# Patient Record
Sex: MALE | Race: WHITE | NOT HISPANIC OR LATINO | Employment: OTHER | URBAN - METROPOLITAN AREA
[De-identification: names, ages, dates, MRNs, and addresses within clinical notes are randomized per-mention and may not be internally consistent; named-entity substitution may affect disease eponyms.]

---

## 2017-01-30 ENCOUNTER — TRANSFERRED RECORDS (OUTPATIENT)
Dept: HEALTH INFORMATION MANAGEMENT | Facility: CLINIC | Age: 72
End: 2017-01-30

## 2017-02-23 ENCOUNTER — OFFICE VISIT (OUTPATIENT)
Dept: INTERNAL MEDICINE | Facility: CLINIC | Age: 72
End: 2017-02-23
Payer: COMMERCIAL

## 2017-02-23 VITALS
TEMPERATURE: 98.9 F | HEIGHT: 72 IN | HEART RATE: 120 BPM | SYSTOLIC BLOOD PRESSURE: 148 MMHG | OXYGEN SATURATION: 96 % | BODY MASS INDEX: 34.81 KG/M2 | DIASTOLIC BLOOD PRESSURE: 78 MMHG | WEIGHT: 257 LBS

## 2017-02-23 DIAGNOSIS — Z01.810 PRE-OPERATIVE CARDIOVASCULAR EXAMINATION: Primary | ICD-10-CM

## 2017-02-23 DIAGNOSIS — M25.512 ACUTE PAIN OF LEFT SHOULDER: ICD-10-CM

## 2017-02-23 PROCEDURE — 99214 OFFICE O/P EST MOD 30 MIN: CPT | Performed by: NURSE PRACTITIONER

## 2017-02-23 NOTE — MR AVS SNAPSHOT
After Visit Summary   2/23/2017    Lance Ibrahim    MRN: [id number]           Patient Information     Date Of Birth          1945        Visit Information        Provider Department      2/23/2017 1:40 PM Anh Spivey NP [facility]        Today's Diagnoses     Pre-operative cardiovascular examination    -  1    Acute pain of left shoulder           Follow-ups after your visit        Additional Services     ORTHO  REFERRAL       [facility] is referring you to the Orthopedic  Services at [facility].       The  Representative will assist you in the coordination of your Orthopedic and Musculoskeletal Care as prescribed by your physician.    The  Representative will call you within 1 business day to help schedule your appointment, or you may contact the  Representative at:    All areas ~ [phone]     Type of Referral : Non Surgical       Timeframe requested: 1 - 2 days    Coverage of these services is subject to the terms and limitations of your health insurance plan.  Please call member services at your health plan with any benefit or coverage questions.      If X-rays, CT or MRI's have been performed, please contact the facility where they were done to arrange for , prior to your scheduled appointment.  Please bring this referral request to your appointment and present it to your specialist.                  Who to contact     If you have questions or need follow up information about today's clinic visit or your schedule please contact [facility] directly at [phone].  Normal or non-critical lab and imaging results will be communicated to you by MyChart, letter or phone within 4 business days after the clinic has received the results. If you do not hear from us within 7 days, please contact the clinic through MyChart or phone. If you have a critical or  "abnormal lab result, we will notify you by phone as soon as possible.  Submit refill requests through [employer] or call your pharmacy and they will forward the refill request to us. Please allow 3 business days for your refill to be completed.          Additional Information About Your Visit        MyChart Information     [employer] lets you send messages to your doctor, view your test results, renew your prescriptions, schedule appointments and more. To sign up, go to [email or web address] . Click on \"Log in\" on the left side of the screen, which will take you to the Welcome page. Then click on \"Sign up Now\" on the right side of the page.     You will be asked to enter the access code listed below, as well as some personal information. Please follow the directions to create your username and password.     Your access code is: [email or web address]  Expires: 2017  2:19 PM     Your access code will  in 90 days. If you need help or a new code, please call your [facility] clinic or [phone].        Care EveryWhere ID     This is your Care EveryWhere ID. This could be used by other organizations to access your [facility] medical records  [id number]        Your Vitals Were     Pulse Temperature Height Pulse Oximetry BMI (Body Mass Index)       120 98.9  F (37.2  C) (Oral) 6' (1.829 m) 96% 34.86 kg/m2        Blood Pressure from Last 3 Encounters:   17 148/78   10/27/16 128/74   10/22/16 139/71    Weight from Last 3 Encounters:   17 257 lb (116.6 kg)   10/27/16 256 lb 3.2 oz (116.2 kg)   10/22/16 250 lb (113.4 kg)              We Performed the Following     ORTHO  REFERRAL        Primary Care Provider Office Phone # Fax #    Anh Spivey [phone]       [facility] [address] [facility] [facility] [address]        Thank you!     Thank you for choosing [facility]  for your care. Our goal is always to provide you with excellent care. Hearing back from " our patients is one way we can continue to improve our services. Please take a few minutes to complete the written survey that you may receive in the mail after your visit with us. Thank you!             Your Updated Medication List - Protect others around you: Learn how to safely use, store and throw away your medicines at www.disposemymeds.org.          This list is accurate as of: 2/23/17  2:19 PM.  Always use your most recent med list.                   Brand Name Dispense Instructions for use    ALEVE 220 MG capsule   Generic drug:  naproxen sodium      Take 220 mg by mouth as needed.       aspirin 325 MG tablet      Take by mouth daily       blood glucose monitoring lancets     204 Box    1 each 2 times daily Pls give pt #204 lancets (2 boxes)       blood glucose monitoring test strip    ACCU-CHEK SMARTVIEW    200 each    Test twice daily       CENTRUM SILVER per tablet      Take 1 tablet by mouth daily.       ferrous gluconate 324 (38 FE) MG tablet    FERGON     Take 1 tablet by mouth daily.       finasteride 5 MG tablet    PROSCAR     Take 5 mg by mouth daily.       fluocinonide 0.05 % cream    LIDEX    60 g    Apply sparingly to affected area twice daily as needed.  Do not apply to face.       fluticasone 50 MCG/ACT spray    FLONASE    16 g    Spray 1-2 sprays into both nostrils daily       lisinopril 10 MG tablet    PRINIVIL/ZESTRIL    180 tablet    Take 2 tablets (20 mg) by mouth daily       loratadine 10 MG tablet    CLARITIN     Take 10 mg by mouth daily       metFORMIN 1000 MG tablet    GLUCOPHAGE    180 tablet    Take 1 tablet (1,000 mg) by mouth 2 times daily (with meals)       SYSTANE BALANCE 0.6 % Soln ophthalmic solution   Generic drug:  propylene glycol      Apply  to eye as needed.       tamsulosin 0.4 MG capsule    FLOMAX     Take 1 capsule by mouth daily.

## 2017-02-23 NOTE — NURSING NOTE
Chief Complaint   Patient presents with     Pre-Op Exam       Initial /78 (BP Location: Right arm, Patient Position: Chair, Cuff Size: Adult Large)  Pulse 120  Temp 98.9  F (37.2  C) (Oral)  Ht 6' (1.829 m)  Wt 257 lb (116.6 kg)  SpO2 96%  BMI 34.86 kg/m2 Estimated body mass index is 34.86 kg/(m^2) as calculated from the following:    Height as of this encounter: 6' (1.829 m).    Weight as of this encounter: 257 lb (116.6 kg).  Medication Reconciliation: complete   Kenya Martinez CMA

## 2017-02-23 NOTE — PROGRESS NOTES
[place] [address]  [facility] [address]  [phone]  Dept: [phone]    PRE-OP EVALUATION:  Today's date: 2017    Lance Ibrahim (: 1945) presents for pre-operative evaluation assessment as requested by Dr. Rebollar.  He requires evaluation and anesthesia risk assessment prior to undergoing surgery/procedure for treatment of left eye cataract.  Proposed procedure: Left cataract extraction    Date of Surgery/ Procedure: 3/7/17  Time of Surgery/ Procedure:   [facility]  Fax number for surgical facility: [phone]  Primary Physician: Anh Spivey  Type of Anesthesia Anticipated: Local with MAC    Patient has a Health Care Directive or Living Will:  NO    1. YES - DO YOU HAVE A HISTORY OF HEART ATTACK, STROKE, STENT, BYPASS OR SURGERY ON AN ARTERY IN THE HEAD, NECK, HEART OR LEG? Stroke   2. NO - Do you ever have any pain or discomfort in your chest?  3. NO - Do you have a history of  Heart Failure?  4. NO - Are you troubled by shortness of breath when: walking on the level, up a slight hill or at night?  5. NO - Do you currently have a cold, bronchitis or other respiratory infection?  6. NO - Do you have a cough, shortness of breath or wheezing?  7. YES - DO YOU SOMETIMES GET PAINS IN THE CALVES OF YOUR LEGS WHEN YOU WALK?   8. NO - Do you or anyone in your family have previous history of blood clots?  9. NO - Do you or does anyone in your family have a serious bleeding problem such as prolonged bleeding following surgeries or cuts?  10. YES - HAVE YOU EVER HAD PROBLEMS WITH ANEMIA OR BEEN TOLD TO TAKE IRON PILLS? When donating blood  11. NO - Have you had any abnormal blood loss such as black, tarry or bloody stools, or abnormal vaginal bleeding?  12. NO - Have you ever had a blood transfusion?  13. NO - Have you or any of your relatives ever had problems with anesthesia?  14. YES - DO YOU HAVE  SLEEP APNEA, EXCESSIVE SNORING OR DAYTIME DROWSINESS? Mouth breathing  15. NO - Do you have any prosthetic heart valves?  16. NO - Do you have prosthetic joints?  17. NO - Is there any chance that you may be pregnant?      HPI:                                                      Brief HPI related to upcoming procedure: OS cataract       DIABETES - Patient has a longstanding history of DiabetesType Type II . Patient is being treated with oral agents and denies significant side effects. Control has been good. Complicating factors include but are not limited to: HTN .                                                                                                             .  HYPERTENSION - Patient has longstanding history of mod-severe HTN , currently denies any symptoms referable to elevated blood pressure. Specifically denies chest pain, palpitations, dyspnea, orthopnea, PND or peripheral edema. Blood pressure readings have been in normal range. Current medication regimen is as listed below. Patient denies any side effects of medication.                                                                                                                                                                                          .    MEDICAL HISTORY:                                                      Patient Active Problem List    Diagnosis Date Noted     Type 2 diabetes, HbA1c goal < 7% (H) 02/22/2013     Priority: High     HTN (hypertension) 07/06/2008     Priority: High     Transient cerebral ischemia 07/09/2004     Priority: High     Problem list name updated by automated process. Provider to review       Benign essential hypertension 02/17/2016     Priority: Medium     Obesity 10/14/2015     Priority: Medium     Diabetes mellitus type 2, uncomplicated (H) 10/14/2015     Priority: Medium     CARDIOVASCULAR SCREENING; LDL GOAL LESS THAN 100 10/31/2010     Priority: Medium     Hypertrophy of prostate with urinary  obstruction 07/09/2004     Priority: Medium     Problem list name updated by automated process. Provider to review       Ventral hernia 06/28/2004     Priority: Medium     Problem list name updated by automated process. Provider to review       Gout 07/20/2012     Priority: Low     Advanced directives, counseling/discussion 12/22/2011     Priority: Low     Advance Directive Problem List Overview:   Name Relationship Phone    Primary Health Care Agent            Alternative Health Care Agent          Discussed advance care planning with patient; however, patient declined at this time. 12/22/2011          Chronic rhinitis 07/09/2004     Priority: Low      Past Medical History   Diagnosis Date     Chronic rhinitis      Diabetes mellitus (H)      HTN (hypertension)      Hypertrophy of prostate with urinary obstruction and other lower urinary tract symptoms (LUTS)      TIA (transient ischaemic attack) 1993     Unspecified transient cerebral ischemia 1993     No definite source found     Past Surgical History   Procedure Laterality Date     C nonspecific procedure  1985     Diastasis recti repair     C nonspecific procedure  1987     Ventral hernia repair     C nonspecific procedure       ORIF of left shoulder     Colonoscopy  3/9/2013     Procedure: COLONOSCOPY;  COLONOSCOPY;  Surgeon: Chau Hogan MD;  Location:  GI     Hernia repair  7/13/2004     ventral      Foot surgery  4/2013     cyst removal      Current Outpatient Prescriptions   Medication Sig Dispense Refill     fluticasone (FLONASE) 50 MCG/ACT spray Spray 1-2 sprays into both nostrils daily 16 g 10     blood glucose monitoring (ACCU-CHEK FASTCLIX) lancets 1 each 2 times daily Pls give pt #204 lancets (2 boxes) 204 Box 1     blood glucose monitoring (ACCU-CHEK SMARTVIEW) test strip Test twice daily 200 each 1     loratadine (CLARITIN) 10 MG tablet Take 10 mg by mouth daily       metFORMIN (GLUCOPHAGE) 1000 MG tablet Take 1 tablet (1,000 mg) by mouth 2  "times daily (with meals) 180 tablet 3     fluocinonide (LIDEX) 0.05 % cream Apply sparingly to affected area twice daily as needed.  Do not apply to face. 60 g 0     lisinopril (PRINIVIL,ZESTRIL) 10 MG tablet Take 2 tablets (20 mg) by mouth daily 180 tablet 4     aspirin 325 MG tablet Take by mouth daily       ferrous gluconate (FERGON) 324 (38 FE) MG tablet Take 1 tablet by mouth daily.       propylene glycol (SYSTANE BALANCE) 0.6 % SOLN Apply  to eye as needed.       tamsulosin (FLOMAX) 0.4 MG 24 hr capsule Take 1 capsule by mouth daily.       finasteride (PROSCAR) 5 MG tablet Take 5 mg by mouth daily.       Multiple Vitamins-Minerals (CENTRUM SILVER) per tablet Take 1 tablet by mouth daily.       naproxen sodium (ALEVE) 220 MG capsule Take 220 mg by mouth as needed.       OTC products: None, except as noted above    Allergies   Allergen Reactions     Penicillins      \"anaphylactic\"     Sulfa Drugs      \"itchy rash, swelling of face and hands\"      Latex Allergy: NO    Social History   Substance Use Topics     Smoking status: Former Smoker     Quit date: 3/17/1993     Smokeless tobacco: Never Used     Alcohol use Yes      Comment: Occ, Once a month     History   Drug Use No       REVIEW OF SYSTEMS:                                                    C: NEGATIVE for fever, chills, change in weight  E/M: NEGATIVE for ear, mouth and throat problems  R: NEGATIVE for significant cough or SOB  CV: NEGATIVE for chest pain, palpitations or peripheral edema  GI: NEGATIVE for nausea, abdominal pain, heartburn, or change in bowel habits  : NEGATIVE for frequency, dysuria, or hematuria  M: NEGATIVE for significant arthralgias or myalgia  N: NEGATIVE for weakness, dizziness or paresthesias  E: NEGATIVE for temperature intolerance, skin/hair changes  H: NEGATIVE for bleeding problems    EXAM:                                                    /78 (BP Location: Right arm, Patient Position: Chair, Cuff Size: Adult Large)  " Pulse 120  Temp 98.9  F (37.2  C) (Oral)  Ht 6' (1.829 m)  Wt 257 lb (116.6 kg)  SpO2 96%  BMI 34.86 kg/m2    GENERAL APPEARANCE: Obese     NECK: no adenopathy, no asymmetry, masses, or scars and thyroid normal to palpation     RESP: lungs clear to auscultation - no rales, rhonchi or wheezes     CV: regular rates and rhythm, normal S1 S2, no S3 or S4 and no murmur, click or rub -     ABDOMEN:  soft, nontender, no HSM or masses and bowel sounds normal     NEURO: Normal strength and tone, sensory exam grossly normal, mentation intact and speech normal    DIAGNOSTICS:                                                    No labs or EKG required for low risk surgery (cataract, skin procedure, breast biopsy, etc)    Recent Labs   Lab Test  10/22/16   2052  10/12/16   1617  03/05/16   0945   12/24/12   0804   HGB  12.9*  13.0*   --    < >  13.2*   PLT  258  237   --    < >  227   INR   --    --    --    --   1.05   NA  142  140  139   < >   --    POTASSIUM  4.1  4.3  4.9   < >   --    CR  1.06  0.94  0.86   < >   --    A1C   --   5.6  6.1*   < >   --     < > = values in this interval not displayed.        IMPRESSION:                                                    Reason for surgery/procedure: OD cataract    The proposed surgical procedure is considered LOW risk.    REVISED CARDIAC RISK INDEX  The patient has the following serious cardiovascular risks for perioperative complications such as (MI, PE, VFib and 3  AV Block):  No serious cardiac risks  INTERPRETATION: 0 risks: Class I (very low risk - 0.4% complication rate)    The patient has the following additional risks for perioperative complications:  No identified additional risks    No diagnosis found.    RECOMMENDATIONS:                                                      --Consult hospital rounder / IM to assist post-op medical management        APPROVAL GIVEN to proceed with proposed procedure, without further diagnostic evaluation       Signed Electronically by:  Anh Spivey NP    Copy of this evaluation report is provided to requesting physician.    [facility] Preop Guidelines

## 2017-02-24 ENCOUNTER — RADIANT APPOINTMENT (OUTPATIENT)
Dept: GENERAL RADIOLOGY | Facility: CLINIC | Age: 72
End: 2017-02-24
Attending: FAMILY MEDICINE
Payer: COMMERCIAL

## 2017-02-24 ENCOUNTER — OFFICE VISIT (OUTPATIENT)
Dept: ORTHOPEDICS | Facility: CLINIC | Age: 72
End: 2017-02-24
Payer: COMMERCIAL

## 2017-02-24 ENCOUNTER — THERAPY VISIT (OUTPATIENT)
Dept: PHYSICAL THERAPY | Facility: CLINIC | Age: 72
End: 2017-02-24
Payer: COMMERCIAL

## 2017-02-24 VITALS
WEIGHT: 257 LBS | HEIGHT: 72 IN | BODY MASS INDEX: 34.81 KG/M2 | DIASTOLIC BLOOD PRESSURE: 76 MMHG | SYSTOLIC BLOOD PRESSURE: 130 MMHG

## 2017-02-24 DIAGNOSIS — M54.2 CERVICALGIA: ICD-10-CM

## 2017-02-24 DIAGNOSIS — M50.30 DEGENERATIVE DISC DISEASE, CERVICAL: ICD-10-CM

## 2017-02-24 DIAGNOSIS — M54.2 CERVICALGIA: Primary | ICD-10-CM

## 2017-02-24 PROCEDURE — 72040 X-RAY EXAM NECK SPINE 2-3 VW: CPT

## 2017-02-24 PROCEDURE — 97110 THERAPEUTIC EXERCISES: CPT | Mod: GP | Performed by: PHYSICAL THERAPIST

## 2017-02-24 PROCEDURE — 97162 PT EVAL MOD COMPLEX 30 MIN: CPT | Mod: GP | Performed by: PHYSICAL THERAPIST

## 2017-02-24 PROCEDURE — G8982 BODY POS GOAL STATUS: HCPCS | Mod: GP | Performed by: PHYSICAL THERAPIST

## 2017-02-24 PROCEDURE — 99204 OFFICE O/P NEW MOD 45 MIN: CPT | Performed by: FAMILY MEDICINE

## 2017-02-24 PROCEDURE — G8981 BODY POS CURRENT STATUS: HCPCS | Mod: GP | Performed by: PHYSICAL THERAPIST

## 2017-02-24 PROCEDURE — 97530 THERAPEUTIC ACTIVITIES: CPT | Mod: GP | Performed by: PHYSICAL THERAPIST

## 2017-02-24 RX ORDER — PREDNISOLONE ACETATE 10 MG/ML
SUSPENSION/ DROPS OPHTHALMIC
COMMUNITY
Start: 2017-02-16 | End: 2017-05-15

## 2017-02-24 RX ORDER — CYCLOBENZAPRINE HCL 5 MG
5 TABLET ORAL
Qty: 10 TABLET | Refills: 0 | Status: SHIPPED | OUTPATIENT
Start: 2017-02-24 | End: 2017-05-15

## 2017-02-24 RX ORDER — GATIFLOXACIN 5 MG/ML
SOLUTION/ DROPS OPHTHALMIC
COMMUNITY
Start: 2017-02-16 | End: 2017-05-15

## 2017-02-24 RX ORDER — KETOROLAC TROMETHAMINE 5 MG/ML
SOLUTION OPHTHALMIC
COMMUNITY
Start: 2017-02-16 | End: 2017-05-15

## 2017-02-24 RX ORDER — METHYLPREDNISOLONE 4 MG
TABLET, DOSE PACK ORAL
Qty: 21 TABLET | Refills: 0 | Status: SHIPPED | OUTPATIENT
Start: 2017-02-24 | End: 2017-05-15

## 2017-02-24 NOTE — Clinical Note
[place]ance Brown Dr. [place] [facility] [place]  Please refer to the visit note at your convenience and feel free to contact me should you have any questions.  Thank you for the consult. Sincerely,  Mika Delacruz DO, [facility]

## 2017-02-24 NOTE — PATIENT INSTRUCTIONS
Thank you for allowing us to participate in your care today.  Please find below your visit diagnosis and the plan going forward.    1. Cervicalgia    2. Degenerative disc disease, cervical      Activity modification as discussed  See Bailey Almonte today for walk-in spine  Physical therapy: [facility] - [phone]  Referral for acupuncture.  They should call you to schedule this appointment  Rx for Flexeril to be used at night.  Discussed side effects and precautions given your age    Follow up in 2 week if not improving or sooner if needed. Call direct clinic number [[phone]] at any time with questions or concerns.    Mika Delacruz DO CAQSM  [facility]  Website: [email or web address]  Twitter: [email or web address]

## 2017-02-24 NOTE — MR AVS SNAPSHOT
After Visit Summary   2/24/2017    Lance Ibrahim    MRN: [id number]           Patient Information     Date Of Birth          1945        Visit Information        Provider Department      2/24/2017 9:40 AM Mika Delacruz DO [facility]        Today's Diagnoses     Cervicalgia    -  1    Degenerative disc disease, cervical          Care Instructions    Thank you for allowing us to participate in your care today.  Please find below your visit diagnosis and the plan going forward.    1. Cervicalgia    2. Degenerative disc disease, cervical      Activity modification as discussed  See Bailey Almonte today for walk-in spine  Physical therapy: [facility] - [phone]  Referral for acupuncture.  They should call you to schedule this appointment  Rx for Flexeril to be used at night.  Discussed side effects and precautions given your age    Follow up in 2 week if not improving or sooner if needed. Call direct clinic number [[phone]] at any time with questions or concerns.    Mika Delacruz DO [facility]  Website: [email or web address]  Twitter: [email or web address]          Follow-ups after your visit        Additional Services     [facility] PT, HAND, AND CHIROPRACTIC REFERRAL       **This order will print in the [facility] Scheduling Office**    Physical Therapy, Hand Therapy and Chiropractic Care are available through:    *[facility]  *[facility]  *[facility]    Call one number to schedule at any of the above locations: [phone].    Your provider has referred you to: Physical Therapy at [facility] or [facility]    Indication/Reason for Referral: Neck Pain - Bailey Almonte  Onset of Illness: 4 days  Therapy Orders: Evaluate and Treat  Special Programs: None  Special Request: None    Autumn Leyva      Additional Comments for the Therapist or Chiropractor: Formal physical therapy - exercises  to improve muscular coordination, endurance, strength, and mobility of the cervical spine and shoulder girdle along with exercises to improve repositioning acuity, oculomotor control and postural stability with use of modalities as needed with home exercise prescription.      Please be aware that coverage of these services is subject to the terms and limitations of your health insurance plan.  Call member services at your health plan with any benefit or coverage questions.      Please bring the following to your appointment:    *Your personal calendar for scheduling future appointments  *Comfortable clothing            [facility] PT, HAND, AND CHIROPRACTIC REFERRAL       **This order will print in the [facility] Scheduling Office**    Physical Therapy, Hand Therapy and Chiropractic Care are available through:    *[facility]  *[facility]  *[facility]    Call one number to schedule at any of the above locations: [phone].    Your provider has referred you to: Chiropractic at [facility] or [facility]    Indication/Reason for Referral: Left sided trap/scapular pain.  Advanced degenerative changes on xray.    Onset of Illness: chronic  Therapy Orders: Evaluate and Treat  Special Programs: None  Special Request: Dr. Carlos Boss - acupuncture. Working with his own Chiro for manipulations    Autumn Leyva      Additional Comments for the Therapist or Chiropractor:     Please be aware that coverage of these services is subject to the terms and limitations of your health insurance plan.  Call member services at your health plan with any benefit or coverage questions.      Please bring the following to your appointment:    *Your personal calendar for scheduling future appointments  *Comfortable clothing                  Who to contact     If you have questions or need follow up information about today's clinic visit or your schedule please contact [facility] SP[facility]TS MEDICINE  "directly at [phone].  Normal or non-critical lab and imaging results will be communicated to you by mylearnadfriendhart, letter or phone within 4 business days after the clinic has received the results. If you do not hear from us within 7 days, please contact the clinic through mylearnadfriendhart or phone. If you have a critical or abnormal lab result, we will notify you by phone as soon as possible.  Submit refill requests through [employer] or call your pharmacy and they will forward the refill request to us. Please allow 3 business days for your refill to be completed.          Additional Information About Your Visit        [employer]friend[employer] Information     [employer] lets you send messages to your doctor, view your test results, renew your prescriptions, schedule appointments and more. To sign up, go to [email or web address] . Click on \"Log in\" on the left side of the screen, which will take you to the Welcome page. Then click on \"Sign up Now\" on the right side of the page.     You will be asked to enter the access code listed below, as well as some personal information. Please follow the directions to create your username and password.     Your access code is: [email or web address]  Expires: 2017  2:19 PM     Your access code will  in 90 days. If you need help or a new code, please call your [facility] clinic or [phone].        Care EveryWhere ID     This is your Care EveryWhere ID. This could be used by other organizations to access your [facility] medical records  [id number]        Your Vitals Were     Height BMI (Body Mass Index)                6' (1.829 m) 34.86 kg/m2           Blood Pressure from Last 3 Encounters:   17 130/76   17 148/78   10/27/16 128/74    Weight from Last 3 Encounters:   17 257 lb (116.6 kg)   17 257 lb (116.6 kg)   10/27/16 256 lb 3.2 oz (116.2 kg)              We Performed the Following     [facility] PT, HAND, AND CHIROPRACTIC REFERRAL     [facility] PT, HAND, AND CHIROPRACTIC REFERRAL        "   Today's Medication Changes          These changes are accurate as of: 2/24/17 10:20 AM.  If you have any questions, ask your nurse or doctor.               Start taking these medicines.        Dose/Directions    cyclobenzaprine 5 MG tablet   Commonly known as:  FLEXERIL   Used for:  Cervicalgia   Started by:  Mika Delacruz DO        Dose:  5 mg   Take 1 tablet (5 mg) by mouth nightly as needed for muscle spasms   Quantity:  10 tablet   Refills:  0            Where to get your medicines      These medications were sent to [facility]/pharmacy #[id number] - [place], [place] [facility] [address]  [address] [address] [address]     Phone:  [phone]     cyclobenzaprine 5 MG tablet                Primary Care Provider Office Phone # Fax #    Anh Spivey -[phone]       [facility] [address] [facility] [address]        Thank you!     Thank you for choosing [facility] SP[facility]TS [facility]  for your care. Our goal is always to provide you with excellent care. Hearing back from our patients is one way we can continue to improve our services. Please take a few minutes to complete the written survey that you may receive in the mail after your visit with us. Thank you!             Your Updated Medication List - Protect others around you: Learn how to safely use, store and throw away your medicines at www.disposemymeds.org.          This list is accurate as of: 2/24/17 10:20 AM.  Always use your most recent med list.                   Brand Name Dispense Instructions for use    ALEVE 220 MG capsule   Generic drug:  naproxen sodium      Take 220 mg by mouth as needed.       aspirin 325 MG tablet      Take by mouth daily       blood glucose monitoring lancets     204 Box    1 each 2 times daily Pls give pt #204 lancets (2 boxes)       blood glucose monitoring test strip    ACCU-CHEK SMARTVIEW    200 each    Test twice daily       CENTRUM SILVER per tablet      Take 1 tablet  by mouth daily.       cyclobenzaprine 5 MG tablet    FLEXERIL    10 tablet    Take 1 tablet (5 mg) by mouth nightly as needed for muscle spasms       ferrous gluconate 324 (38 FE) MG tablet    FERGON     Take 1 tablet by mouth daily.       finasteride 5 MG tablet    PROSCAR     Take 5 mg by mouth daily.       fluocinonide 0.05 % cream    LIDEX    60 g    Apply sparingly to affected area twice daily as needed.  Do not apply to face.       fluticasone 50 MCG/ACT spray    FLONASE    16 g    Spray 1-2 sprays into both nostrils daily       gatifloxacin 0.5 % ophthalmic solution    ZYMAXID         ketorolac 0.5 % ophthalmic solution    ACULAR         lisinopril 10 MG tablet    PRINIVIL/ZESTRIL    180 tablet    Take 2 tablets (20 mg) by mouth daily       loratadine 10 MG tablet    CLARITIN     Take 10 mg by mouth daily       metFORMIN 1000 MG tablet    GLUCOPHAGE    180 tablet    Take 1 tablet (1,000 mg) by mouth 2 times daily (with meals)       prednisoLONE acetate 1 % ophthalmic susp    PRED FORTE         SYSTANE BALANCE 0.6 % Soln ophthalmic solution   Generic drug:  propylene glycol      Apply  to eye as needed.       tamsulosin 0.4 MG capsule    FLOMAX     Take 1 capsule by mouth daily.

## 2017-02-24 NOTE — MR AVS SNAPSHOT
"              After Visit Summary   2/24/2017    Lance Ibrahim    MRN: [id number]           Patient Information     Date Of Birth          1945        Visit Information        Provider Department      2/24/2017 11:40 AM Bailey Almonte, PT [facility]        Today's Diagnoses     Cervicalgia    -  1       Follow-ups after your visit        Your next 10 appointments already scheduled     Mar 01, 2017 10:40 AM CST   [facility] with Bailey Almonte PT   [facility] ([facility]  )    [address] [address] [address]   [phone]              Who to contact     If you have questions or need follow up information about today's clinic visit or your schedule please contact [facility] directly at [phone].  Normal or non-critical lab and imaging results will be communicated to you by MyChart, letter or phone within 4 business days after the clinic has received the results. If you do not hear from us within 7 days, please contact the clinic through MyChart or phone. If you have a critical or abnormal lab result, we will notify you by phone as soon as possible.  Submit refill requests through [employer] or call your pharmacy and they will forward the refill request to us. Please allow 3 business days for your refill to be completed.          Additional Information About Your Visit        MyChart Information     [employer] lets you send messages to your doctor, view your test results, renew your prescriptions, schedule appointments and more. To sign up, go to [email or web address] . Click on \"Log in\" on the left side of the screen, which will take you to the Welcome page. Then click on \"Sign up Now\" on the right side of the page.     You will be asked to enter the access code listed below, as well as some personal information. Please follow the directions to create your username and password.     Your access code is: [email or web address]  Expires: 5/24/2017  2:19 PM     Your access " code will  in 90 days. If you need help or a new code, please call your [facility] or [phone].        Care EveryWhere ID     This is your Care EveryWhere ID. This could be used by other organizations to access your [facility] medical records  [id number]         Blood Pressure from Last 3 Encounters:   17 130/76   17 148/78   10/27/16 128/74    Weight from Last 3 Encounters:   17 116.6 kg (257 lb)   17 116.6 kg (257 lb)   10/27/16 116.2 kg (256 lb 3.2 oz)              We Performed the Following     [facility] PT EVAL, MODERATE COMPLEXITY     [facility] INITIAL EVAL REPORT     THERAPEUTIC ACTIVITIES     THERAPEUTIC EXERCISES          Today's Medication Changes          These changes are accurate as of: 17  1:28 PM.  If you have any questions, ask your nurse or doctor.               Start taking these medicines.        Dose/Directions    cyclobenzaprine 5 MG tablet   Commonly known as:  FLEXERIL   Used for:  Cervicalgia   Started by:  Mika Delacruz DO        Dose:  5 mg   Take 1 tablet (5 mg) by mouth nightly as needed for muscle spasms   Quantity:  10 tablet   Refills:  0       methylPREDNISolone 4 MG tablet   Commonly known as:  MEDROL DOSEPAK   Used for:  Cervicalgia   Started by:  Mika Delacruz DO        Follow package instructions   Quantity:  21 tablet   Refills:  0            Where to get your medicines      These medications were sent to [facility]/pharmacy #[id number] - [place], [place] - [address]  [address] [address] [address]     Phone:  [phone]     cyclobenzaprine 5 MG tablet    methylPREDNISolone 4 MG tablet                Primary Care Provider Office Phone # Fax #    Anh Spivey -[phone]       [facility] [address] [facility] [address]        Thank you!     Thank you for choosing [facility]  for your care. Our goal is always to provide you with excellent care. Hearing back from our patients  is one way we can continue to improve our services. Please take a few minutes to complete the written survey that you may receive in the mail after your visit with us. Thank you!             Your Updated Medication List - Protect others around you: Learn how to safely use, store and throw away your medicines at www.disposemymeds.org.          This list is accurate as of: 2/24/17  1:28 PM.  Always use your most recent med list.                   Brand Name Dispense Instructions for use    ALEVE 220 MG capsule   Generic drug:  naproxen sodium      Take 220 mg by mouth as needed.       aspirin 325 MG tablet      Take by mouth daily       blood glucose monitoring lancets     204 Box    1 each 2 times daily Pls give pt #204 lancets (2 boxes)       blood glucose monitoring test strip    ACCU-CHEK SMARTVIEW    200 each    Test twice daily       CENTRUM SILVER per tablet      Take 1 tablet by mouth daily.       cyclobenzaprine 5 MG tablet    FLEXERIL    10 tablet    Take 1 tablet (5 mg) by mouth nightly as needed for muscle spasms       ferrous gluconate 324 (38 FE) MG tablet    FERGON     Take 1 tablet by mouth daily.       finasteride 5 MG tablet    PROSCAR     Take 5 mg by mouth daily.       fluocinonide 0.05 % cream    LIDEX    60 g    Apply sparingly to affected area twice daily as needed.  Do not apply to face.       fluticasone 50 MCG/ACT spray    FLONASE    16 g    Spray 1-2 sprays into both nostrils daily       gatifloxacin 0.5 % ophthalmic solution    ZYMAXID         ketorolac 0.5 % ophthalmic solution    ACULAR         lisinopril 10 MG tablet    PRINIVIL/ZESTRIL    180 tablet    Take 2 tablets (20 mg) by mouth daily       loratadine 10 MG tablet    CLARITIN     Take 10 mg by mouth daily       metFORMIN 1000 MG tablet    GLUCOPHAGE    180 tablet    Take 1 tablet (1,000 mg) by mouth 2 times daily (with meals)       methylPREDNISolone 4 MG tablet    MEDROL DOSEPAK    21 tablet    Follow package instructions        prednisoLONE acetate 1 % ophthalmic susp    PRED FORTE         SYSTANE BALANCE 0.6 % Soln ophthalmic solution   Generic drug:  propylene glycol      Apply  to eye as needed.       tamsulosin 0.4 MG capsule    FLOMAX     Take 1 capsule by mouth daily.

## 2017-02-24 NOTE — NURSING NOTE
Chief Complaint   Patient presents with     Musculoskeletal Problem       Initial /76  Ht 6' (1.829 m)  Wt 257 lb (116.6 kg)  BMI 34.86 kg/m2 Estimated body mass index is 34.86 kg/(m^2) as calculated from the following:    Height as of this encounter: 6' (1.829 m).    Weight as of this encounter: 257 lb (116.6 kg).  Medication Reconciliation: complete     Driss Batista, ATC

## 2017-02-24 NOTE — PROGRESS NOTES
"[facility] Initial Evaluation -- Cervical    Evaluation Date: February 24, 2017  Lance Ibrahim is a 71 year old male with a left shoulder condition.   Referral: Dr. Delacruz  Work mechanical stresses: desk/computer   Employment status: working full time  Leisure mechanical stresses: not able  Functional disability score (NDI):  38  VAS score (0-10): 9/10  Patient goals:  \"I want a magic bullet to be pain free\"    HISTORY:    Present symptoms:  Left shoulder.  Pain quality (sharp/shooting/stabbing/aching/burning/cramping):   sharp.  Paresthesia (yes/no):  no    Present since: February 20, 2017.    Symptoms (improving/unchanging/worsening):  unchanging.  Symptoms commenced as a result of: no apparent reason   Condition occurred in the following environment:  work    Symptoms at onset (neck/arm/forearm/headache): shoulder, left  Constant symptoms (neck/arm/forearm/headache): left shoulder  Intermittent symptoms (neck/arm/forearm/headache): n/a    Symptoms are made worse with the following: Always Turning, looking up/always  Symptoms are made better with the following: Always Lying, Always When still and ice    Disturbed sleep (yes/no): yes  Number of pillows: 3  Sleeping postures (prone/sup/side R/L): sides/mainly L    Previous episodes (0/1-5/6-10/11+): none Year of first episode: n/a    Previous history: no previous spinal history; did fracture/dislocate L UE with ORIF (1998) with limited L UE mobility  Previous treatments: chiro, somewhat helpful    Specific Questions: (as reported by the patient)  Dizziness/Tinnitus/Nausea/Swallowing (pos/neg): neg  Gait/Upper Limbs (normal/abnormal): normal  Medications (nil/NSAIDS/anlag/steroids/anticoag/other):  Muscle relaxants  Medical allergies:  Penicillin, sulfa  General health (excellent/good/fair/poor):  good  Pertinent medical history:  Rheumatoid Arthritis, Diabetes, Overweight, High blood pressure, Stroke and Sleep disorder/Apnea  Imaging " (NA/Xray/MRI):  xray  Recent or major surgery (yes/no): no  Night pain (yes/no): yes  Accidents (yes/no): no  Unexplained weight loss (yes/no): no  Barriers at home: none  Other red flags: none    EXAMINATION    Posture:   Sitting (good/fair/poor): poor  Standing (good/fair/poor): fair/poor     Protruded head (yes/no): yes    Wry Neck (right/left/nil):  no  Relevant (yes/no):  o     Correction of posture(better/worse/no effect): worse  Other observations:  none    Neurological:    Motor Deficit:  intact   Reflexes:  intact  Sensory Deficit:  intact   Dural signs:  Unable to assess due to L UE limited ROM     Movement Loss:   Bruce Mod Min Nil Pain   Protrusion   x  pdm   Flexion   x  erp   Retraction   x  erp   Extension  x x  erp   Lateral flexion R   x     Lateral flexion L  x   erp   Rotation R   x x    Rotation L  x   erp     Test Movements:   During: produces, abolishes, increases, decreases, no effect, centralizing, peripheralizing  After: better, worse, no better, no worse, no effect, centralized, peripheralized    Pretest symptoms sitting: L scap   Symptoms During Symptoms After ROM increased ROM decreased No Effect   PRO        Rep PRO        RET Increases No Worse      Rep RET Increases Worse   x   RET EXT        Rep RET EXT        Pretest symptoms lying:  L scap   Symptoms During Symptoms After ROM increased ROM decreased No Effect   RET Decreases No Better      Rep RET Decreases Better x     RET EXT        Rep RET EXT        If required, pretest symptoms sitting:  Not tested      ROM increased ROM decreased No Effect   LF-R        Rep LF-R        LF-L        Rep LF-L        ROT-R        Rep ROT-R        ROT-L        Rep ROT-L        FLEX        Rep FLEX            Static Tests:   Protrusion:    Flexion:    Retraction:    Extension (sitting/prone/supine):      Other Tests: n/a    Provisional Classification:  Derangement - Asymmetrical, unilateral, symptoms above elbow    Principle of Management:  Education:   Traffic light, posture, frequency    Equipment provided:  none  Mechanical therapy (Y/N):  Y   Extension principle:  Rep RET in supine x 10-12/2   Lateral principle:  n/a  Flexion principle:  n/a   Other:  n/a    ASSESSMENT/PLAN:    Patient is a 71 year old male with cervical complaints.    Patient has the following significant findings with corresponding treatment plan.                Diagnosis 1:  cervicalgia  Pain -  manual therapy, self management, education, directional preference exercise and home program  Decreased ROM/flexibility - manual therapy and therapeutic exercise  Decreased function - therapeutic activities  Impaired posture - neuro re-education    Therapy Evaluation Codes:   1) History comprised of:   Personal factors that impact the plan of care:      None.    Comorbidity factors that impact the plan of care are:      Diabetes, High blood pressure, Overweight, Rheumatoid arthritis and Stroke.     Medications impacting care: High blood pressure, Pain and Metformin.  2) Examination of Body Systems comprised of:   Body structures and functions that impact the plan of care:      Cervical spine.   Activity limitations that impact the plan of care are:      Driving, Reading/Computer work and Sitting.  3) Clinical presentation characteristics are:   Evolving/Changing.  4) Decision-Making    Moderate complexity using standardized patient assessment instrument and/or measureable assessment of functional outcome.  Cumulative Therapy Evaluation is: Moderate complexity.    Previous and current functional limitations:  (See Goal Flow Sheet for this information)    Short term and Long term goals: (See Goal Flow Sheet for this information)     Communication ability:  Patient appears to be able to clearly communicate and understand verbal and written communication and follow directions correctly.  Treatment Explanation - The following has been discussed with the patient:   RX ordered/plan of care  Anticipated  outcomes  Possible risks and side effects  This patient would benefit from PT intervention to resume normal activities.   Rehab potential is good.    Frequency:  1 X week, once daily  Duration:  for 6 weeks  Discharge Plan:  Achieve all LTG.  Independent in home treatment program.  Reach maximal therapeutic benefit.    Please refer to the daily flowsheet for treatment today, total treatment time and time spent performing 1:1 timed codes.

## 2017-02-24 NOTE — PROGRESS NOTES
ASSESSMENT & PLAN    ICD-10-CM    1. Cervicalgia M54.2 XR Cervical Spine 2/3 Views     [facility]     cyclobenzaprine (FLEXERIL) 5 MG tablet     [facility]   2. Degenerative disc disease, cervical M50.30    Reviewed xray - advanced degenerative/end plate changes and explained that pain is related to his neck  Looking for immediate fix / shot to resolve the pain and explained we don't give IM injections in our office  See Bailey Almonte today for walk-in spine  Physical therapy: [facility] - [phone]  Referral for acupuncture.  They should call you to schedule this appointment  Rx for Flexeril to be used at night.  Discussed side effects and precautions given your age    Follow up in 2 week if not improving or sooner if needed. Call direct clinic number [[phone]] at any time with questions or concerns. Instructed to call the office if the condition evolves or worsens.    -----    SUBJECTIVE  Lance Ibrahim is a/an 71 year old right hand dominant male who is seen in consultation at the request of Dr. Spivey for evaluation of left shoulder pain and upper trapezius pain. The patient is seen by themselves.    Onset: 4 day(s) ago. Reports insidious onset without acute precipitating event.  Worsened by: pressure, sitting  Better with: unknown  Quality: stabbing, deep, local - no radiating pain  Pain Scale (maximum/current)/10: 9/10 / 8/10  Treatments tried: ice, heat and Aleve, chiropractor  Orthopedic history: YES - Date: 1998, fell of a ladder and landed on shoulder  Relevant surgical history: YES - Date: 1998 fracture repair of left shoulder  Patient Social History: retired    He is having cataract surgery in about one week.    Patient's past medical, surgical, social, and family histories were reviewed today and no changes are noted.    REVIEW OF SYSTEMS:  10 point ROS is negative other than symptoms noted above in HPI, Past Medical  History or as stated below  Constitutional: NEGATIVE for fever, chills, change in weight  Skin: NEGATIVE for worrisome rashes, moles or lesions  GI/: NEGATIVE for bowel or bladder changes  Neuro: NEGATIVE for weakness, dizziness or paresthesias    OBJECTIVE:  /76  Ht 6' (1.829 m)  Wt 257 lb (116.6 kg)  BMI 34.86 kg/m2   General: healthy, alert and in no distress  HEENT: no scleral icterus or conjunctival erythema  Skin: no suspicious lesions or rash. No jaundice.  CV: regular rhythm by palpation  Resp: normal respiratory effort without conversational dyspnea   Psych: normal mood and affect  Gait: normal steady gait with appropriate coordination and balance  Neuro: normal light touch sensory exam of the bilateral upper extremities.  Motor strength as noted below.  MSK:  LEFT SHOULDER  Inspection:    Rounded shoulders, large mature scar anterior chest/humerus  Palpation:    NonTender about the AC joint, anterior capsule, proximal biceps tendon and supraspinatus insertion. Remainder of bony and tendinous landmarks are nontender.  Active Range of Motion:     Abduction 600, , , IR none - this is baseline for him 2/2 fracture/repair in 1998.      CERVICAL SPINE  Inspection:    normal cervical lordosis present, rounded shoulders, forward head posture  Palpation:    Tender about the upper trapezius (left) and medial border of scapula. Otherwise remainder of the landmarks and nontender.  Range of Motion:     Flexion limited by pain    Extension limited by pain    Right side bend full    Left side bend limited by pain    Right rotation full    Left rotation limited substantially by pain  Strength:    biceps (C6) 5/5, wrist extension (C6) 5/5, triceps (C7) 5/5, wrist flexion (C7) 5/5    Independent visualization of the below image:  XR CERVICAL SPINE   Advanced degenerative/end plate changes  Disc space narrowing at C6-7  Final radiology read pending    Patient's conditions were thoroughly discussed during  today's visit with greater than 50% of the visit spent counseling the patient with total time spent face-to-face with the patient being 15 minutes.    Mika Delacruz DO [facility]

## 2017-02-24 NOTE — LETTER
"DEPARTMENT OF HEALTH AND HUMAN SERVICES  CENTERS FOR MEDICARE & MEDICAID SERVICES    PLAN/UPDATED PLAN OF PROGRESS FOR OUTPATIENT REHABILITATION    PATIENTS NAME:  Lance Ibrahim   : 1945  PROVIDER NUMBER:    [id number]  [id number]   PROVIDER NAME: [facility]  MEDICAL RECORD NUMBER: [id number]   START OF CARE DATE:  17   TYPE:  PT  PRIMARY/TREATMENT DIAGNOSIS: Cervicalgia  VISITS FROM START OF CARE:  Rxs Used: 1     [facility] Initial Evaluation -- Cervical    Evaluation Date: 2017  Lance Ibrahim is a 71 year old male with a left shoulder condition.   Referral: Dr. Delacruz  Work mechanical stresses: desk/computer   Employment status: working full time  Leisure mechanical stresses: not able  Functional disability score (NDI):  38  VAS score (0-10): 9/10  Patient goals:  \"I want a magic bullet to be pain free\"    HISTORY:    Present symptoms:  Left shoulder.  Pain quality (sharp/shooting/stabbing/aching/burning/cramping):   sharp.  Paresthesia (yes/no):  no    Present since: 2017.    Symptoms (improving/unchanging/worsening):  unchanging.  Symptoms commenced as a result of: no apparent reason   Condition occurred in the following environment:  work    Symptoms at onset (neck/arm/forearm/headache): shoulder, left  Constant symptoms (neck/arm/forearm/headache): left shoulder  Intermittent symptoms (neck/arm/forearm/headache): n/a    Symptoms are made worse with the following: Always Turning, looking up/always  Symptoms are made better with the following: Always Lying, Always When still and ice    Disturbed sleep (yes/no): yes  Number of pillows: 3  Sleeping postures (prone/sup/side R/L): sides/mainly L    Previous episodes (0/1-5/6-10/11+): none Year of first episode: n/a    PATIENTS NAME:  Lance Ibrahim   : 1945      Previous history: no previous spinal history; did fracture/dislocate L UE with ORIF () with limited L UE " mobility  Previous treatments: chiro, somewhat helpful    Specific Questions: (as reported by the patient)  Dizziness/Tinnitus/Nausea/Swallowing (pos/neg): neg  Gait/Upper Limbs (normal/abnormal): normal  Medications (nil/NSAIDS/anlag/steroids/anticoag/other):  Muscle relaxants  Medical allergies:  Penicillin, sulfa  General health (excellent/good/fair/poor):  good  Pertinent medical history:  Rheumatoid Arthritis, Diabetes, Overweight, High blood pressure, Stroke and Sleep disorder/Apnea  Imaging (NA/Xray/MRI):  xray  Recent or major surgery (yes/no): no  Night pain (yes/no): yes  Accidents (yes/no): no  Unexplained weight loss (yes/no): no  Barriers at home: none  Other red flags: none    EXAMINATION    Posture:   Sitting (good/fair/poor): poor  Standing (good/fair/poor): fair/poor     Protruded head (yes/no): yes    Wry Neck (right/left/nil):  no  Relevant (yes/no):  o     Correction of posture(better/worse/no effect): worse  Other observations:  none    Neurological:  Motor Deficit:  intact   Reflexes:  intact  Sensory Deficit:  intact   Dural signs:  Unable to assess due to L UE limited ROM     Movement Loss:   Bruce Mod Min Nil Pain   Protrusion   x  pdm   Flexion   x  erp   Retraction   x  erp   Extension  x x  erp   Lateral flexion R   x     Lateral flexion L  x   erp   Rotation R   x x    Rotation L  x   erp   PATIENTS NAME:  Manuelsantos Lance   : 1945      Test Movements:   During: produces, abolishes, increases, decreases, no effect, centralizing, peripheralizing  After: better, worse, no better, no worse, no effect, centralized, peripheralized    Pretest symptoms sitting: L scap   Symptoms During Symptoms After ROM increased ROM decreased No Effect   PRO        Rep PRO        RET Increases No Worse      Rep RET Increases Worse   x   RET EXT        Rep RET EXT        Pretest symptoms lying:  L scap   Symptoms During Symptoms After ROM increased ROM decreased No Effect   RET Decreases No Better      Rep  RET Decreases Better x     RET EXT        Rep RET EXT        If required, pretest symptoms sitting:  Not tested      ROM increased ROM decreased No Effect   LF-R        Rep LF-R        LF-L        Rep LF-L        ROT-R        Rep ROT-R        ROT-L        Rep ROT-L        FLEX        Rep FLEX            Static Tests:   Protrusion:    Flexion:    Retraction:    Extension (sitting/prone/supine):      Other Tests: n/a    Provisional Classification:  Derangement - Asymmetrical, unilateral, symptoms above elbow    PATIENTS NAME:  Lance Ibrahim   : 1945      Principle of Management:  Education:  Traffic light, posture, frequency    Equipment provided:  none  Mechanical therapy (Y/N):  Y   Extension principle:  Rep RET in supine x 10-12/2   Lateral principle:  n/a  Flexion principle:  n/a   Other:  n/a    ASSESSMENT/PLAN:    Patient is a 71 year old male with cervical complaints.    Patient has the following significant findings with corresponding treatment plan.                Diagnosis 1:  cervicalgia  Pain -  manual therapy, self management, education, directional preference exercise and home program  Decreased ROM/flexibility - manual therapy and therapeutic exercise  Decreased function - therapeutic activities  Impaired posture - neuro re-education    Therapy Evaluation Codes:   1) History comprised of:   Personal factors that impact the plan of care:      None.    Comorbidity factors that impact the plan of care are:      Diabetes, High blood pressure, Overweight, Rheumatoid arthritis and Stroke.     Medications impacting care: High blood pressure, Pain and Metformin.  2) Examination of Body Systems comprised of:   Body structures and functions that impact the plan of care:      Cervical spine.   Activity limitations that impact the plan of care are:      Driving, Reading/Computer work and Sitting.  3) Clinical presentation characteristics are:   Evolving/Changing.  4) Decision-Making    Moderate complexity  "using standardized patient assessment instrument and/or measureable assessment of functional outcome.  Cumulative Therapy Evaluation is: Moderate complexity.    Previous and current functional limitations:  (See Goal Flow Sheet for this information)    Short term and Long term goals: (See Goal Flow Sheet for this information)     Communication ability:  Patient appears to be able to clearly communicate and understand verbal and written communication and follow directions correctly.  Treatment Explanation - The following has been discussed with the patient:   RX ordered/plan of care  Anticipated outcomes  Possible risks and side effects  PATIENTS NAME:  Lance Ibrahim   : 1945      This patient would benefit from PT intervention to resume normal activities.   Rehab potential is good.    Frequency:  1 X week, once daily  Duration:  for 6 weeks  Discharge Plan:  Achieve all LTG.  Independent in home treatment program.  Reach maximal therapeutic benefit.        Caregiver Signature/Credentials _____________________________ Date ________       Treating Provider: Bailey Almonte PT, Cert.MDT    I have reviewed and certified the need for these services and plan of treatment while under my care.        PHYSICIAN'S SIGNATURE:   _____________________________________  Date___________                                Mika Delacruz      Certification period:  Beginning of Cert date period: 17 to  End of Cert period date: 17     Functional Level Progress Report: Please see attached \"Goal Flow sheet for Functional level.\"    ____X____ Continue Services or       ________ DC Services                Service dates: From  SOC Date: 17 date to present                         "

## 2017-03-01 ENCOUNTER — THERAPY VISIT (OUTPATIENT)
Dept: PHYSICAL THERAPY | Facility: CLINIC | Age: 72
End: 2017-03-01
Payer: COMMERCIAL

## 2017-03-01 DIAGNOSIS — M54.2 CERVICALGIA: ICD-10-CM

## 2017-03-01 PROCEDURE — 97112 NEUROMUSCULAR REEDUCATION: CPT | Mod: GP | Performed by: PHYSICAL THERAPIST

## 2017-03-01 PROCEDURE — 97140 MANUAL THERAPY 1/> REGIONS: CPT | Mod: GP | Performed by: PHYSICAL THERAPIST

## 2017-03-01 PROCEDURE — 97110 THERAPEUTIC EXERCISES: CPT | Mod: GP | Performed by: PHYSICAL THERAPIST

## 2017-03-08 ENCOUNTER — TRANSFERRED RECORDS (OUTPATIENT)
Dept: HEALTH INFORMATION MANAGEMENT | Facility: CLINIC | Age: 72
End: 2017-03-08

## 2017-03-28 ENCOUNTER — TRANSFERRED RECORDS (OUTPATIENT)
Dept: HEALTH INFORMATION MANAGEMENT | Facility: CLINIC | Age: 72
End: 2017-03-28

## 2017-05-15 ENCOUNTER — OFFICE VISIT (OUTPATIENT)
Dept: INTERNAL MEDICINE | Facility: CLINIC | Age: 72
End: 2017-05-15
Payer: COMMERCIAL

## 2017-05-15 VITALS
WEIGHT: 264.5 LBS | RESPIRATION RATE: 16 BRPM | BODY MASS INDEX: 35.83 KG/M2 | HEART RATE: 93 BPM | OXYGEN SATURATION: 95 % | DIASTOLIC BLOOD PRESSURE: 80 MMHG | TEMPERATURE: 97.8 F | HEIGHT: 72 IN | SYSTOLIC BLOOD PRESSURE: 144 MMHG

## 2017-05-15 DIAGNOSIS — I10 BENIGN ESSENTIAL HYPERTENSION: ICD-10-CM

## 2017-05-15 DIAGNOSIS — E11.9 TYPE 2 DIABETES MELLITUS WITHOUT COMPLICATION, WITHOUT LONG-TERM CURRENT USE OF INSULIN (H): Primary | ICD-10-CM

## 2017-05-15 LAB — HBA1C MFR BLD: 6.3 % (ref 4.3–6)

## 2017-05-15 PROCEDURE — 80061 LIPID PANEL: CPT | Performed by: NURSE PRACTITIONER

## 2017-05-15 PROCEDURE — 80048 BASIC METABOLIC PNL TOTAL CA: CPT | Performed by: NURSE PRACTITIONER

## 2017-05-15 PROCEDURE — 36415 COLL VENOUS BLD VENIPUNCTURE: CPT | Performed by: NURSE PRACTITIONER

## 2017-05-15 PROCEDURE — 99214 OFFICE O/P EST MOD 30 MIN: CPT | Performed by: NURSE PRACTITIONER

## 2017-05-15 PROCEDURE — 83036 HEMOGLOBIN GLYCOSYLATED A1C: CPT | Performed by: NURSE PRACTITIONER

## 2017-05-15 NOTE — MR AVS SNAPSHOT
"              After Visit Summary   5/15/2017    Lance Ibrahim    MRN: [id number]           Patient Information     Date Of Birth          1945        Visit Information        Provider Department      5/15/2017 4:40 PM Anh Spivey NP [facility]        Today's Diagnoses     Type 2 diabetes mellitus without complication, without long-term current use of insulin (H)    -  1    Benign essential hypertension           Follow-ups after your visit        Who to contact     If you have questions or need follow up information about today's clinic visit or your schedule please contact [facility] directly at [phone].  Normal or non-critical lab and imaging results will be communicated to you by MyChart, letter or phone within 4 business days after the clinic has received the results. If you do not hear from us within 7 days, please contact the clinic through [employer] or phone. If you have a critical or abnormal lab result, we will notify you by phone as soon as possible.  Submit refill requests through [employer] or call your pharmacy and they will forward the refill request to us. Please allow 3 business days for your refill to be completed.          Additional Information About Your Visit        MyChart Information     [employer] lets you send messages to your doctor, view your test results, renew your prescriptions, schedule appointments and more. To sign up, go to [email or web address] . Click on \"Log in\" on the left side of the screen, which will take you to the Welcome page. Then click on \"Sign up Now\" on the right side of the page.     You will be asked to enter the access code listed below, as well as some personal information. Please follow the directions to create your username and password.     Your access code is: [email or web address]  Expires: 2017  3:19 PM     Your access code will  in 90 days. If you need help or a new code, please call your [facility] or " [phone].        Care EveryWhere ID     This is your Care EveryWhere ID. This could be used by other organizations to access your [facility] medical records  [id number]        Your Vitals Were     Pulse Temperature Respirations Height Pulse Oximetry BMI (Body Mass Index)    93 97.8  F (36.6  C) (Oral) 16 6' (1.829 m) 95% 35.87 kg/m2       Blood Pressure from Last 3 Encounters:   05/15/17 144/80   02/24/17 130/76   02/23/17 148/78    Weight from Last 3 Encounters:   05/15/17 264 lb 8 oz (120 kg)   02/24/17 257 lb (116.6 kg)   02/23/17 257 lb (116.6 kg)              We Performed the Following     Basic metabolic panel     Hemoglobin A1c     Lipid panel reflex to direct LDL        Primary Care Provider Office Phone # Fax #    Anh Spivey [phone]       [facility]  [address] [place] [address]        Thank you!     Thank you for choosing [facility]  for your care. Our goal is always to provide you with excellent care. Hearing back from our patients is one way we can continue to improve our services. Please take a few minutes to complete the written survey that you may receive in the mail after your visit with us. Thank you!             Your Updated Medication List - Protect others around you: Learn how to safely use, store and throw away your medicines at www.disposemymeds.org.          This list is accurate as of: 5/15/17  5:18 PM.  Always use your most recent med list.                   Brand Name Dispense Instructions for use    ALEVE 220 MG capsule   Generic drug:  naproxen sodium      Take 220 mg by mouth as needed.       aspirin 325 MG tablet      Take by mouth daily       blood glucose monitoring lancets     204 Box    1 each 2 times daily Pls give pt #204 lancets (2 boxes)       blood glucose monitoring test strip    ACCU-CHEK SMARTVIEW    200 each    Test twice daily       CENTRUM SILVER per tablet      Take 1 tablet by mouth daily.        ferrous gluconate 324 (38 FE) MG tablet    FERGON     Take 1 tablet by mouth daily.       finasteride 5 MG tablet    PROSCAR     Take 5 mg by mouth daily.       fluticasone 50 MCG/ACT spray    FLONASE    16 g    Spray 1-2 sprays into both nostrils daily       lisinopril 10 MG tablet    PRINIVIL/ZESTRIL    180 tablet    Take 2 tablets (20 mg) by mouth daily       metFORMIN 1000 MG tablet    GLUCOPHAGE    180 tablet    Take 1 tablet (1,000 mg) by mouth 2 times daily (with meals)       SYSTANE BALANCE 0.6 % Soln ophthalmic solution   Generic drug:  propylene glycol      Apply  to eye as needed.       tamsulosin 0.4 MG capsule    FLOMAX     Take 1 capsule by mouth daily.

## 2017-05-15 NOTE — LETTER
[facility]  [address], [address]  [place]  [address]      May 16, 2017    Lance Ibrahim                                                                                                                                                       [address] [place] [address]        Dear Lance,    The results of your recent tests were within normal limits/stable.    Enclosed is a copy of the results.  It was a pleasure to see you at your last appointment.    If you have any questions or concerns, please contact our office at [phone].        Sincerely,      Anh Spivey C.N.P.

## 2017-05-15 NOTE — PROGRESS NOTES
SUBJECTIVE:                                                    Lance Ibrahim is a 72 year old male who presents to clinic today for the following health issues:      Diabetes Follow-up    Patient is checking blood sugars: twice daily.    Blood sugar testing frequency justification: Adjustment of medication(s)  Results are as follows:         am - 110-120         suppertime - 120-150    Diabetic concerns: None     Symptoms of hypoglycemia (low blood sugar): none     Paresthesias (numbness or burning in feet) or sores: No     Date of last diabetic eye exam: 03/2017     Hypertension Follow-up      Outpatient blood pressures are not being checked.    Low Salt Diet: not monitoring salt       Amount of exercise or physical activity: None    Problems taking medications regularly: No    Medication side effects: none    Diet: regular (no restrictions)         Patient Active Problem List   Diagnosis     Ventral hernia     Chronic rhinitis     Hypertrophy of prostate with urinary obstruction     Transient cerebral ischemia     HTN (hypertension)     CARDIOVASCULAR SCREENING; LDL GOAL LESS THAN 100     Advanced directives, counseling/discussion     Gout     Type 2 diabetes, HbA1c goal < 7% (H)     Obesity     Diabetes mellitus type 2, uncomplicated (H)     Benign essential hypertension     Cervicalgia     Past Surgical History:   Procedure Laterality Date     C NONSPECIFIC PROCEDURE  1985    Diastasis recti repair     C NONSPECIFIC PROCEDURE  1987    Ventral hernia repair     C NONSPECIFIC PROCEDURE      ORIF of left shoulder     COLONOSCOPY  3/9/2013    Procedure: COLONOSCOPY;  COLONOSCOPY;  Surgeon: Chau Hogan MD;  Location:  GI     EYE SURGERY  03/13/2017    left eye     FOOT SURGERY  4/2013    cyst removal      HERNIA REPAIR  7/13/2004    ventral        Social History   Substance Use Topics     Smoking status: Former Smoker     Quit date: 3/17/1993     Smokeless tobacco: Never Used     Alcohol use Yes      Comment:  Occ, Once a month     Family History   Problem Relation Age of Onset     Family History Negative Mother       88 yo     CANCER Father       74 yo brain     DIABETES Maternal Grandfather       91 yo     Alcohol/Drug Paternal Grandfather               Current Outpatient Prescriptions   Medication Sig Dispense Refill     fluticasone (FLONASE) 50 MCG/ACT spray Spray 1-2 sprays into both nostrils daily 16 g 10     blood glucose monitoring (ACCU-CHEK FASTCLIX) lancets 1 each 2 times daily Pls give pt #204 lancets (2 boxes) 204 Box 1     blood glucose monitoring (ACCU-CHEK SMARTVIEW) test strip Test twice daily 200 each 1     metFORMIN (GLUCOPHAGE) 1000 MG tablet Take 1 tablet (1,000 mg) by mouth 2 times daily (with meals) 180 tablet 3     lisinopril (PRINIVIL,ZESTRIL) 10 MG tablet Take 2 tablets (20 mg) by mouth daily 180 tablet 4     aspirin 325 MG tablet Take by mouth daily       ferrous gluconate (FERGON) 324 (38 FE) MG tablet Take 1 tablet by mouth daily.       propylene glycol (SYSTANE BALANCE) 0.6 % SOLN Apply  to eye as needed.       tamsulosin (FLOMAX) 0.4 MG 24 hr capsule Take 1 capsule by mouth daily.       finasteride (PROSCAR) 5 MG tablet Take 5 mg by mouth daily.       Multiple Vitamins-Minerals (CENTRUM SILVER) per tablet Take 1 tablet by mouth daily.       naproxen sodium (ALEVE) 220 MG capsule Take 220 mg by mouth as needed.       Recent Labs   Lab Test  10/22/16   2052  10/12/16   1617  16   0945  10/19/15   0850  02/14/15   0840  11/10/14   1343  14   0927  14   0931   12   0804   A1C   --   5.6  6.1*  6.1*  5.6   --   5.6  5.9   < >   --    LDL   --    --   112*   --   72   --   73  101   --    --    HDL   --    --   62   --   55   --   76  46   --    --    TRIG   --    --   77   --   57   --   62  81   --    --    ALT  28   --    --    --    --    --   27  27   --    --    CR  1.06  0.94  0.86  0.93  0.82   --   0.83  0.85   < >   --     GFRESTIMATED  69  79  88  80  >90  Non  GFR Calc     --   >90  Non  GFR Calc    89   < >   --    GFRESTBLACK  83  >90   GFR Calc    >90   GFR Calc    >90   GFR Calc    >90   GFR Calc     --   >90   GFR Calc    >90   < >   --    POTASSIUM  4.1  4.3  4.9  4.1  4.3   --   4.5  4.3   < >   --    TSH   --    --    --    --    --   3.90   --    --    --   3.10    < > = values in this interval not displayed.      BP Readings from Last 3 Encounters:   05/15/17 144/80   02/24/17 130/76   02/23/17 148/78    Wt Readings from Last 3 Encounters:   05/15/17 264 lb 8 oz (120 kg)   02/24/17 257 lb (116.6 kg)   02/23/17 257 lb (116.6 kg)                    Reviewed and updated as needed this visit by clinical staff  Tobacco  Allergies  Meds  Med Hx  Surg Hx  Fam Hx  Soc Hx      Reviewed and updated as needed this visit by Provider         ROS:  Constitutional, HEENT, cardiovascular, pulmonary, gi and gu systems are negative, except as otherwise noted.    OBJECTIVE:                                                    /80 (BP Location: Right arm, Patient Position: Chair, Cuff Size: Adult Regular)  Pulse 93  Temp 97.8  F (36.6  C) (Oral)  Resp 16  Ht 6' (1.829 m)  Wt 264 lb 8 oz (120 kg)  SpO2 95%  BMI 35.87 kg/m2  Body mass index is 35.87 kg/(m^2).  GENERAL: morbidly obese, NAD  NECK: no adenopathy, no asymmetry, masses, or scars and thyroid normal to palpation  RESP: lungs clear to auscultation - no rales, rhonchi or wheezes  CV: regular rate and rhythm, normal S1 S2, no S3 or S4, no murmur, click or rub, no peripheral edema and peripheral pulses strong         ASSESSMENT/PLAN:                                                              ICD-10-CM    1. Type 2 diabetes mellitus without complication, without long-term current use of insulin (H) E11.9 Basic metabolic panel     Hemoglobin A1c     Lipid  panel reflex to direct LDL   2. Benign essential hypertension I10        F/u 6 months and prn    Anh Spivey NP  [facility]

## 2017-05-15 NOTE — NURSING NOTE
Chief Complaint   Patient presents with     Follow Up For     diabetes and BP       Initial /80 (BP Location: Right arm, Patient Position: Chair, Cuff Size: Adult Regular)  Pulse 93  Temp 97.8  F (36.6  C) (Oral)  Resp 16  Ht 6' (1.829 m)  Wt 264 lb 8 oz (120 kg)  SpO2 95%  BMI 35.87 kg/m2 Estimated body mass index is 35.87 kg/(m^2) as calculated from the following:    Height as of this encounter: 6' (1.829 m).    Weight as of this encounter: 264 lb 8 oz (120 kg).  Medication Reconciliation: complete

## 2017-05-16 LAB
ANION GAP SERPL CALCULATED.3IONS-SCNC: 8 MMOL/L (ref 3–14)
BUN SERPL-MCNC: 18 MG/DL (ref 7–30)
CALCIUM SERPL-MCNC: 9.2 MG/DL (ref 8.5–10.1)
CHLORIDE SERPL-SCNC: 106 MMOL/L (ref 94–109)
CHOLEST SERPL-MCNC: 159 MG/DL
CO2 SERPL-SCNC: 28 MMOL/L (ref 20–32)
CREAT SERPL-MCNC: 0.88 MG/DL (ref 0.66–1.25)
GFR SERPL CREATININE-BSD FRML MDRD: 85 ML/MIN/1.7M2
GLUCOSE SERPL-MCNC: 116 MG/DL (ref 70–99)
HDLC SERPL-MCNC: 53 MG/DL
LDLC SERPL CALC-MCNC: 86 MG/DL
NONHDLC SERPL-MCNC: 106 MG/DL
POTASSIUM SERPL-SCNC: 3.9 MMOL/L (ref 3.4–5.3)
SODIUM SERPL-SCNC: 142 MMOL/L (ref 133–144)
TRIGL SERPL-MCNC: 99 MG/DL

## 2017-05-25 DIAGNOSIS — I10 ESSENTIAL HYPERTENSION: ICD-10-CM

## 2017-05-25 DIAGNOSIS — E11.9 TYPE 2 DIABETES MELLITUS WITHOUT COMPLICATION, WITHOUT LONG-TERM CURRENT USE OF INSULIN (H): ICD-10-CM

## 2017-05-25 NOTE — TELEPHONE ENCOUNTER
Lisinopril      Last Written Prescription Date: 0/10/16  Last Fill Quantity: 180, # refills: 4  Last Office Visit with G, P or [facility] prescribing provider: 05/15/17       Potassium   Date Value Ref Range Status   05/15/2017 3.9 3.4 - 5.3 mmol/L Final     Creatinine   Date Value Ref Range Status   05/15/2017 0.88 0.66 - 1.25 mg/dL Final     BP Readings from Last 3 Encounters:   05/15/17 144/80   02/24/17 130/76   02/23/17 148/78

## 2017-05-26 RX ORDER — LISINOPRIL 10 MG/1
TABLET ORAL
Qty: 180 TABLET | Refills: 3 | Status: SHIPPED | OUTPATIENT
Start: 2017-05-26 | End: 2018-05-31

## 2017-05-26 NOTE — TELEPHONE ENCOUNTER
Prescription approved per [facility] Refill Protocol.'  Kyle BAE RN

## 2017-06-18 DIAGNOSIS — E11.9 TYPE 2 DIABETES MELLITUS (H): ICD-10-CM

## 2017-06-20 RX ORDER — LANCETS
EACH MISCELLANEOUS
Qty: 204 EACH | Refills: 1 | Status: SHIPPED | OUTPATIENT
Start: 2017-06-20 | End: 2018-02-11

## 2017-06-20 RX ORDER — BLOOD SUGAR DIAGNOSTIC
STRIP MISCELLANEOUS
Qty: 200 STRIP | Refills: 1 | Status: SHIPPED | OUTPATIENT
Start: 2017-06-20 | End: 2018-02-11

## 2017-06-20 NOTE — TELEPHONE ENCOUNTER
Accu-Chek Lancets and test strips      Last Written Prescription Date: 11/29/16  BOTH  Last Fill Quantity: 204 ,  # refills: 1 BOTH   Last Office Visit with [facility], [facility] or [facility] prescribing provider: 05/15/17 Spivey

## 2017-08-02 ENCOUNTER — TRANSFERRED RECORDS (OUTPATIENT)
Dept: HEALTH INFORMATION MANAGEMENT | Facility: CLINIC | Age: 72
End: 2017-08-02

## 2017-09-15 ENCOUNTER — OFFICE VISIT (OUTPATIENT)
Dept: URGENT CARE | Facility: URGENT CARE | Age: 72
End: 2017-09-15
Payer: COMMERCIAL

## 2017-09-15 VITALS
SYSTOLIC BLOOD PRESSURE: 146 MMHG | OXYGEN SATURATION: 97 % | DIASTOLIC BLOOD PRESSURE: 86 MMHG | TEMPERATURE: 97.9 F | BODY MASS INDEX: 36.48 KG/M2 | HEART RATE: 91 BPM | WEIGHT: 269 LBS

## 2017-09-15 DIAGNOSIS — M25.512 ACUTE PAIN OF LEFT SHOULDER: Primary | ICD-10-CM

## 2017-09-15 DIAGNOSIS — M54.9 MUSCULOSKELETAL BACK PAIN: ICD-10-CM

## 2017-09-15 PROCEDURE — 99214 OFFICE O/P EST MOD 30 MIN: CPT | Performed by: PHYSICIAN ASSISTANT

## 2017-09-15 RX ORDER — CYCLOBENZAPRINE HCL 10 MG
5-10 TABLET ORAL 3 TIMES DAILY PRN
Qty: 30 TABLET | Refills: 1 | Status: SHIPPED | OUTPATIENT
Start: 2017-09-15 | End: 2017-12-28

## 2017-09-15 RX ORDER — HYDROCODONE BITARTRATE AND ACETAMINOPHEN 5; 325 MG/1; MG/1
1-2 TABLET ORAL EVERY 6 HOURS PRN
Qty: 20 TABLET | Refills: 0 | Status: SHIPPED | OUTPATIENT
Start: 2017-09-15 | End: 2017-12-28

## 2017-09-15 RX ORDER — LIDOCAINE 50 MG/G
PATCH TOPICAL
Qty: 30 PATCH | Refills: 0 | Status: SHIPPED | OUTPATIENT
Start: 2017-09-15 | End: 2017-12-28

## 2017-09-15 NOTE — MR AVS SNAPSHOT
"              After Visit Summary   9/15/2017    Lance Ibrahim    MRN: [id number]           Patient Information     Date Of Birth          1945        Visit Information        Provider Department      9/15/2017 1:20 PM Law Carrillo PA-C [facility]        Today's Diagnoses     Acute pain of left shoulder    -  1    Musculoskeletal back pain           Follow-ups after your visit        Who to contact     If you have questions or need follow up information about today's clinic visit or your schedule please contact [facility] directly at [phone].  Normal or non-critical lab and imaging results will be communicated to you by Tistagameshart, letter or phone within 4 business days after the clinic has received the results. If you do not hear from us within 7 days, please contact the clinic through Tistagameshart or phone. If you have a critical or abnormal lab result, we will notify you by phone as soon as possible.  Submit refill requests through Bunchball or call your pharmacy and they will forward the refill request to us. Please allow 3 business days for your refill to be completed.          Additional Information About Your Visit        MyChart Information     Bunchball lets you send messages to your doctor, view your test results, renew your prescriptions, schedule appointments and more. To sign up, go to [email or web address] . Click on \"Log in\" on the left side of the screen, which will take you to the Welcome page. Then click on \"Sign up Now\" on the right side of the page.     You will be asked to enter the access code listed below, as well as some personal information. Please follow the directions to create your username and password.     Your access code is: [email or web address]  Expires: 2017  2:22 PM     Your access code will  in 90 days. If you need help or a new code, please call your [place] clinic or [phone].        Care EveryWhere ID     " This is your Care EveryWhere ID. This could be used by other organizations to access your [facility] medical records  [id number]        Your Vitals Were     Pulse Temperature Pulse Oximetry BMI (Body Mass Index)          91 97.9  F (36.6  C) (Oral) 97% 36.48 kg/m2         Blood Pressure from Last 3 Encounters:   09/15/17 146/86   05/15/17 144/80   02/24/17 130/76    Weight from Last 3 Encounters:   09/15/17 269 lb (122 kg)   05/15/17 264 lb 8 oz (120 kg)   02/24/17 257 lb (116.6 kg)              Today, you had the following     No orders found for display         Today's Medication Changes          These changes are accurate as of: 9/15/17  2:22 PM.  If you have any questions, ask your nurse or doctor.               Start taking these medicines.        Dose/Directions    cyclobenzaprine 10 MG tablet   Commonly known as:  FLEXERIL   Used for:  Musculoskeletal back pain        Dose:  5-10 mg   Take 0.5-1 tablets (5-10 mg) by mouth 3 times daily as needed for muscle spasms   Quantity:  30 tablet   Refills:  1       HYDROcodone-acetaminophen 5-325 MG per tablet   Commonly known as:  NORCO   Used for:  Acute pain of left shoulder, Musculoskeletal back pain        Dose:  1-2 tablet   Take 1-2 tablets by mouth every 6 hours as needed for moderate to severe pain   Quantity:  20 tablet   Refills:  0       lidocaine 5 % Patch   Commonly known as:  LIDODERM   Used for:  Musculoskeletal back pain, Acute pain of left shoulder        Apply up to 3 patches to painful area at once for up to 12 h within a 24 h period.  Remove after 12 hours.   Quantity:  30 patch   Refills:  0            Where to get your medicines      Some of these will need a paper prescription and others can be bought over the counter.  Ask your nurse if you have questions.     Bring a paper prescription for each of these medications     cyclobenzaprine 10 MG tablet    HYDROcodone-acetaminophen 5-325 MG per tablet    lidocaine 5 % Patch                Primary  Care Provider Office Phone # Fax #    Anh Pruitt Allyssa, ELANA [phone]       [address] [facility] [address]        Equal Access to Services     OTTONIEL BURGESS : Hadii aad ku hadbillyo Soomaali, waaxda luqadaha, qaybta kaalmada adeegyada, ashley shelby laHernandezjorgito bran. So [facility] [phone].    ATENCIÓN: Si habla español, tiene a [facility]aiser disposición servicios gratuitos de asistencia lingüística. Llame al [phone].    We comply with applicable federal civil rights laws and [place] laws. We do not discriminate on the basis of race, color, national origin, age, disability sex, sexual orientation or gender identity.            Thank you!     Thank you for choosing [facility]  for your care. Our goal is always to provide you with excellent care. Hearing back from our patients is one way we can continue to improve our services. Please take a few minutes to complete the written survey that you may receive in the mail after your visit with us. Thank you!             Your Updated Medication List - Protect others around you: Learn how to safely use, store and throw away your medicines at www.disposemymeds.org.          This list is accurate as of: 9/15/17  2:22 PM.  Always use your most recent med list.                   Brand Name Dispense Instructions for use Diagnosis    ACCU-CHEK SMARTVIEW test strip   Generic drug:  blood glucose monitoring     200 strip    TEST TWICE DAILY    Type 2 diabetes mellitus (H)       ALEVE 220 MG capsule   Generic drug:  naproxen sodium      Take 220 mg by mouth as needed.        aspirin 325 MG tablet      Take by mouth daily        blood glucose monitoring lancets     204 each    USE 1 LANCET TWICE A DAY    Type 2 diabetes mellitus (H)       CENTRUM SILVER per tablet      Take 1 tablet by mouth daily.        cyclobenzaprine 10 MG tablet    FLEXERIL    30 tablet    Take 0.5-1 tablets (5-10 mg) by mouth 3 times daily as needed for  muscle spasms    Musculoskeletal back pain       ferrous gluconate 324 (38 FE) MG tablet    FERGON     Take 1 tablet by mouth daily.        finasteride 5 MG tablet    PROSCAR     Take 5 mg by mouth daily.        fluticasone 50 MCG/ACT spray    FLONASE    16 g    Spray 1-2 sprays into both nostrils daily    Chronic rhinitis       HYDROcodone-acetaminophen 5-325 MG per tablet    NORCO    20 tablet    Take 1-2 tablets by mouth every 6 hours as needed for moderate to severe pain    Acute pain of left shoulder, Musculoskeletal back pain       lidocaine 5 % Patch    LIDODERM    30 patch    Apply up to 3 patches to painful area at once for up to 12 h within a 24 h period.  Remove after 12 hours.    Musculoskeletal back pain, Acute pain of left shoulder       lisinopril 10 MG tablet    PRINIVIL/ZESTRIL    180 tablet    TAKE 2 TABLETS (20 MG) BY MOUTH DAILY    Essential hypertension       metFORMIN 1000 MG tablet    GLUCOPHAGE    180 tablet    Take 1 tablet (1,000 mg) by mouth 2 times daily (with meals)    Type 2 diabetes mellitus without complication, without long-term current use of insulin (H)       SYSTANE BALANCE 0.6 % Soln ophthalmic solution   Generic drug:  propylene glycol      Apply  to eye as needed.        tamsulosin 0.4 MG capsule    FLOMAX     Take 1 capsule by mouth daily.

## 2017-09-15 NOTE — NURSING NOTE
"Chief Complaint   Patient presents with     Pain     states has pain above lt scapula for past few days,\"feels like a knot\",pain when moving       Initial /86 (BP Location: Right arm, Patient Position: Chair, Cuff Size: Adult Large)  Pulse 91  Temp 97.9  F (36.6  C) (Oral)  Wt 269 lb (122 kg)  SpO2 97%  BMI 36.48 kg/m2 Estimated body mass index is 36.48 kg/(m^2) as calculated from the following:    Height as of 5/15/17: 6' (1.829 m).    Weight as of this encounter: 269 lb (122 kg).  Medication Reconciliation: complete Cee HERNANDEZ    "

## 2017-09-15 NOTE — PROGRESS NOTES
"SUBJECTIVE:  Chief Complaint   Patient presents with     Pain     states has pain above lt scapula for past few days,\"feels like a knot\",pain when moving     Lance Ibrahim is a 72 year old male presents with a chief complaint of left posterior shoulder tenderness localized pain, isolated .  How: no known injury.  The patient complained of moderate and severe pain  and has had decreased ROM.  Pain exacerbated by pressing on that localized area.  Relieved by nothing.  He treated it initially with nothing. This is the first time this type of injury has occurred to this patient.   The pain is worse with pressing on an isolated area, localized pain, tenderness.     Past Medical History:   Diagnosis Date     Chronic rhinitis      Diabetes mellitus (H)      HTN (hypertension)      Hypertrophy of prostate with urinary obstruction and other lower urinary tract symptoms (LUTS)      TIA (transient ischaemic attack) 1993     Unspecified transient cerebral ischemia 1993    No definite source found     Allergies   Allergen Reactions     Penicillins      \"anaphylactic\"     Sulfa Drugs      \"itchy rash, swelling of face and hands\"     Social History   Substance Use Topics     Smoking status: Former Smoker     Quit date: 3/17/1993     Smokeless tobacco: Never Used     Alcohol use Yes      Comment: Occ, Once a month       ROS:  CONSTITUTIONAL:NEGATIVE for fever, chills, change in weight  INTEGUMENTARY/SKIN: NEGATIVE for worrisome rashes, moles or lesions  MUSCULOSKELETAL: POSITIVE  for left lateral ankle tenderness, localized pain, swelling  NEURO: NEGATIVE for weakness, dizziness or paresthesias    EXAM:   /86 (BP Location: Right arm, Patient Position: Chair, Cuff Size: Adult Large)  Pulse 91  Temp 97.9  F (36.6  C) (Oral)  Wt 269 lb (122 kg)  SpO2 97%  BMI 36.48 kg/m2  Gen: healthy,alert,no distress  Extremity: ankle has point tenderness left lateral ankle.   There is not compromise to the distal circulation.  Pulses are " +2 and CRT is brisk  EXTREMITIES: peripheral pulses normal  MS:  Positive for left lateral ankle tenderness  SKIN: no suspicious lesions or rashes  NEURO: Normal strength and tone, sensory exam grossly normal, mentation intact and speech normal    X-RAY was done and negative for acute changes including fracture Xray read by Law Carrillo at time of visit    ASSESSMENT/PLAN:      ICD-10-CM    1. Acute pain of left shoulder M25.512 HYDROcodone-acetaminophen (NORCO) 5-325 MG per tablet     lidocaine (LIDODERM) 5 % Patch   2. Musculoskeletal back pain M54.9 HYDROcodone-acetaminophen (NORCO) 5-325 MG per tablet     cyclobenzaprine (FLEXERIL) 10 MG tablet     lidocaine (LIDODERM) 5 % Patch       Ice compresses  Follow up with PCP as needed  Go to the ED if any symptoms worsen, or pain worsens

## 2017-10-04 ENCOUNTER — OFFICE VISIT (OUTPATIENT)
Dept: INTERNAL MEDICINE | Facility: CLINIC | Age: 72
End: 2017-10-04
Payer: COMMERCIAL

## 2017-10-04 VITALS
WEIGHT: 271.2 LBS | HEIGHT: 72 IN | BODY MASS INDEX: 36.73 KG/M2 | DIASTOLIC BLOOD PRESSURE: 74 MMHG | HEART RATE: 102 BPM | RESPIRATION RATE: 20 BRPM | SYSTOLIC BLOOD PRESSURE: 150 MMHG | OXYGEN SATURATION: 97 %

## 2017-10-04 DIAGNOSIS — I10 ESSENTIAL HYPERTENSION: ICD-10-CM

## 2017-10-04 DIAGNOSIS — E11.9 TYPE 2 DIABETES MELLITUS WITHOUT COMPLICATION, WITHOUT LONG-TERM CURRENT USE OF INSULIN (H): Primary | ICD-10-CM

## 2017-10-04 PROCEDURE — 99214 OFFICE O/P EST MOD 30 MIN: CPT | Performed by: NURSE PRACTITIONER

## 2017-10-04 ASSESSMENT — PAIN SCALES - GENERAL: PAINLEVEL: NO PAIN (0)

## 2017-10-04 NOTE — NURSING NOTE
Chief Complaint   Patient presents with     Diabetes     6 month follow up and needs lisinoril and metformin refilled       Initial /74 (BP Location: Right arm, Patient Position: Sitting, Cuff Size: Adult Large)  Pulse 102  Resp 20  Ht 6' (1.829 m)  Wt 271 lb 3.2 oz (123 kg)  SpO2 97%  BMI 36.78 kg/m2 Estimated body mass index is 36.78 kg/(m^2) as calculated from the following:    Height as of this encounter: 6' (1.829 m).    Weight as of this encounter: 271 lb 3.2 oz (123 kg).  Medication Reconciliation: complete   Radha Cheek, Medical Assistant

## 2017-10-04 NOTE — MR AVS SNAPSHOT
After Visit Summary   10/4/2017    Lance Ibrahim    MRN: [id number]           Patient Information     Date Of Birth          1945        Visit Information        Provider Department      10/4/2017 4:00 PM Anh Spivey NP [facility]        Today's Diagnoses     Type 2 diabetes mellitus without complication, without long-term current use of insulin (H)    -  1    Essential hypertension           Follow-ups after your visit        Your next 10 appointments already scheduled     Oct 07, 2017 10:30 AM CDT   LAB with [facility]   [facility] ([facility]    [address]  [facility] [address]   [phone]           Patient must bring picture ID. Patient should be prepared to give a urine specimen  Please do not eat 10-12 hours before your appointment if you are coming in fasting for labs on lipids, cholesterol, or glucose (sugar). Pregnant women should follow their Care Team instructions. Water with medications is okay. Do not drink coffee or other fluids. If you have concerns about taking  your medications, please ask at office or if scheduling via [id number], send a message by clicking on Secure Messaging, Message Your Care Team.              Future tests that were ordered for you today     Open Future Orders        Priority Expected Expires Ordered    **A1C FUTURE anytime Routine 10/4/2017 10/4/2018 10/4/2017    **Basic metabolic panel FUTURE anytime Routine 10/4/2017 10/4/2018 10/4/2017    **Lipid panel reflex to direct LDL FUTURE anytime Routine 10/4/2017 10/4/2018 10/4/2017            Who to contact     If you have questions or need follow up information about today's clinic visit or your schedule please contact [facility] directly at [phone].  Normal or non-critical lab and imaging results will be communicated to you by MyChart, letter or phone within 4 business days after the clinic has received the results.  "If you do not hear from us within 7 days, please contact the clinic through Sift Shopping or phone. If you have a critical or abnormal lab result, we will notify you by phone as soon as possible.  Submit refill requests through Sift Shopping or call your pharmacy and they will forward the refill request to us. Please allow 3 business days for your refill to be completed.          Additional Information About Your Visit        Sift Shopping Information     Sift Shopping lets you send messages to your doctor, view your test results, renew your prescriptions, schedule appointments and more. To sign up, go to [email or web address] . Click on \"Log in\" on the left side of the screen, which will take you to the Welcome page. Then click on \"Sign up Now\" on the right side of the page.     You will be asked to enter the access code listed below, as well as some personal information. Please follow the directions to create your username and password.     Your access code is: [email or web address]  Expires: 2017  2:22 PM     Your access code will  in 90 days. If you need help or a new code, please call your [place] clinic or [phone].        Care EveryWhere ID     This is your Care EveryWhere ID. This could be used by other organizations to access your [place] medical records  [id number]        Your Vitals Were     Pulse Respirations Height Pulse Oximetry BMI (Body Mass Index)       102 20 6' (1.829 m) 97% 36.78 kg/m2        Blood Pressure from Last 3 Encounters:   10/04/17 150/74   09/15/17 146/86   05/15/17 144/80    Weight from Last 3 Encounters:   10/04/17 271 lb 3.2 oz (123 kg)   09/15/17 269 lb (122 kg)   05/15/17 264 lb 8 oz (120 kg)               Primary Care Provider Office Phone # Fax #    Anh Spivey [phone]       [address] [facility] [address]        Equal Access to Services     OTTONIEL BURGESS AH: Hadii jacqui babino Soivan, waaxda luqadaha, qaybta kaalashley ferrara " sammilea alcirastar la'aan ah. So [facility] [phone].    ATENCIÓN: Si radhala leonard, tiene a [facility] disposición servicios gratuitos de asistencia lingüística. Mary kirkland [phone].    We comply with applicable federal civil rights laws and [place] laws. We do not discriminate on the basis of race, color, national origin, age, disability, sex, sexual orientation, or gender identity.            Thank you!     Thank you for choosing [facility]  for your care. Our goal is always to provide you with excellent care. Hearing back from our patients is one way we can continue to improve our services. Please take a few minutes to complete the written survey that you may receive in the mail after your visit with us. Thank you!             Your Updated Medication List - Protect others around you: Learn how to safely use, store and throw away your medicines at www.disposemymeds.org.          This list is accurate as of: 10/4/17  4:45 PM.  Always use your most recent med list.                   Brand Name Dispense Instructions for use Diagnosis    ACCU-CHEK SMARTVIEW test strip   Generic drug:  blood glucose monitoring     200 strip    TEST TWICE DAILY    Type 2 diabetes mellitus (H)       ALEVE 220 MG capsule   Generic drug:  naproxen sodium      Take 220 mg by mouth as needed.        aspirin 325 MG tablet      Take by mouth daily        blood glucose monitoring lancets     204 each    USE 1 LANCET TWICE A DAY    Type 2 diabetes mellitus (H)       CENTRUM SILVER per tablet      Take 1 tablet by mouth daily.        cyclobenzaprine 10 MG tablet    FLEXERIL    30 tablet    Take 0.5-1 tablets (5-10 mg) by mouth 3 times daily as needed for muscle spasms    Musculoskeletal back pain       ferrous gluconate 324 (38 FE) MG tablet    FERGON     Take 1 tablet by mouth daily.        finasteride 5 MG tablet    PROSCAR     Take 5 mg by mouth daily.        fluticasone 50 MCG/ACT spray    FLONASE    16 g    Spray 1-2 sprays into both  nostrils daily    Chronic rhinitis       HYDROcodone-acetaminophen 5-325 MG per tablet    NORCO    20 tablet    Take 1-2 tablets by mouth every 6 hours as needed for moderate to severe pain    Acute pain of left shoulder, Musculoskeletal back pain       lidocaine 5 % Patch    LIDODERM    30 patch    Apply up to 3 patches to painful area at once for up to 12 h within a 24 h period.  Remove after 12 hours.    Musculoskeletal back pain, Acute pain of left shoulder       lisinopril 10 MG tablet    PRINIVIL/ZESTRIL    180 tablet    TAKE 2 TABLETS (20 MG) BY MOUTH DAILY    Essential hypertension       metFORMIN 1000 MG tablet    GLUCOPHAGE    180 tablet    Take 1 tablet (1,000 mg) by mouth 2 times daily (with meals)    Type 2 diabetes mellitus without complication, without long-term current use of insulin (H)       SYSTANE BALANCE 0.6 % Soln ophthalmic solution   Generic drug:  propylene glycol      Apply  to eye as needed.        tamsulosin 0.4 MG capsule    FLOMAX     Take 1 capsule by mouth daily.

## 2017-10-04 NOTE — PROGRESS NOTES
SUBJECTIVE:   Lance Ibrahim is a 72 year old male who presents to clinic today for the following health issues:      Diabetes Follow-up    Patient is checking blood sugars: twice daily.    Blood sugar testing frequency justification: Uncontrolled diabetes  Results are as follows:       am - 110-120       suppertime - 140-150        Diabetic concerns: None     Symptoms of hypoglycemia (low blood sugar): none     Paresthesias (numbness or burning in feet) or sores: No   Date of last diabetic eye exam: 4/2017      Amount of exercise or physical activity: None    Problems taking medications regularly: No    Medication side effects: none  Diet: regular (no restrictions) and diabetic      Hyperlipidemia Follow-Up      Rate your low fat/cholesterol diet?: good    Taking statin?  No    Other lipid medications/supplements?:  none    Hypertension Follow-up      Outpatient blood pressures are not being checked.    Low Salt Diet: no added salt            Patient Active Problem List   Diagnosis     Ventral hernia     Chronic rhinitis     Hypertrophy of prostate with urinary obstruction     Transient cerebral ischemia     HTN (hypertension)     CARDIOVASCULAR SCREENING; LDL GOAL LESS THAN 100     Advanced directives, counseling/discussion     Gout     Type 2 diabetes, HbA1c goal < 7% (H)     Obesity     Diabetes mellitus type 2, uncomplicated (H)     Benign essential hypertension     Cervicalgia     Past Surgical History:   Procedure Laterality Date     C NONSPECIFIC PROCEDURE  1985    Diastasis recti repair     C NONSPECIFIC PROCEDURE  1987    Ventral hernia repair     C NONSPECIFIC PROCEDURE      ORIF of left shoulder     COLONOSCOPY  3/9/2013    Procedure: COLONOSCOPY;  COLONOSCOPY;  Surgeon: Chau Hogan MD;  Location:  GI     EYE SURGERY  03/13/2017    left eye     FOOT SURGERY  4/2013    cyst removal      HERNIA REPAIR  7/13/2004    ventral        Social History   Substance Use Topics     Smoking status: Former  Smoker     Quit date: 3/17/1993     Smokeless tobacco: Never Used     Alcohol use Yes      Comment: Occ, Once a month     Family History   Problem Relation Age of Onset     Family History Negative Mother       86 yo     CANCER Father       76 yo brain     DIABETES Maternal Grandfather       89 yo     Alcohol/Drug Paternal Grandfather               Current Outpatient Prescriptions   Medication Sig Dispense Refill     blood glucose monitoring (ACCU-CHEK FASTCLIX) lancets USE 1 LANCET TWICE A  each 1     ACCU-CHEK SMARTVIEW test strip TEST TWICE DAILY 200 strip 1     lisinopril (PRINIVIL/ZESTRIL) 10 MG tablet TAKE 2 TABLETS (20 MG) BY MOUTH DAILY 180 tablet 3     fluticasone (FLONASE) 50 MCG/ACT spray Spray 1-2 sprays into both nostrils daily 16 g 10     metFORMIN (GLUCOPHAGE) 1000 MG tablet Take 1 tablet (1,000 mg) by mouth 2 times daily (with meals) 180 tablet 3     aspirin 325 MG tablet Take by mouth daily       ferrous gluconate (FERGON) 324 (38 FE) MG tablet Take 1 tablet by mouth daily.       propylene glycol (SYSTANE BALANCE) 0.6 % SOLN Apply  to eye as needed.       tamsulosin (FLOMAX) 0.4 MG 24 hr capsule Take 1 capsule by mouth daily.       finasteride (PROSCAR) 5 MG tablet Take 5 mg by mouth daily.       Multiple Vitamins-Minerals (CENTRUM SILVER) per tablet Take 1 tablet by mouth daily.       naproxen sodium (ALEVE) 220 MG capsule Take 220 mg by mouth as needed.       HYDROcodone-acetaminophen (NORCO) 5-325 MG per tablet Take 1-2 tablets by mouth every 6 hours as needed for moderate to severe pain (Patient not taking: Reported on 10/4/2017) 20 tablet 0     cyclobenzaprine (FLEXERIL) 10 MG tablet Take 0.5-1 tablets (5-10 mg) by mouth 3 times daily as needed for muscle spasms (Patient not taking: Reported on 10/4/2017) 30 tablet 1     lidocaine (LIDODERM) 5 % Patch Apply up to 3 patches to painful area at once for up to 12 h within a 24 h period.  Remove after 12 hours.  (Patient not taking: Reported on 10/4/2017) 30 patch 0     Recent Labs   Lab Test  05/15/17   1714  10/22/16   2052  10/12/16   1617  03/05/16   0945   02/14/15   0840  11/10/14   1343  08/16/14   0927  04/18/14   0931   12/24/12   0804   A1C  6.3*   --   5.6  6.1*   < >  5.6   --   5.6  5.9   < >   --    LDL  86   --    --   112*   --   72   --   73  101   --    --    HDL  53   --    --   62   --   55   --   76  46   --    --    TRIG  99   --    --   77   --   57   --   62  81   --    --    ALT   --   28   --    --    --    --    --   27  27   --    --    CR  0.88  1.06  0.94  0.86   < >  0.82   --   0.83  0.85   < >   --    GFRESTIMATED  85  69  79  88   < >  >90  Non  GFR Calc     --   >90  Non  GFR Calc    89   < >   --    GFRESTBLACK  >90   GFR Calc    83  >90   GFR Calc    >90   GFR Calc     < >  >90   GFR Calc     --   >90   GFR Calc    >90   < >   --    POTASSIUM  3.9  4.1  4.3  4.9   < >  4.3   --   4.5  4.3   < >   --    TSH   --    --    --    --    --    --   3.90   --    --    --   3.10    < > = values in this interval not displayed.      BP Readings from Last 3 Encounters:   10/04/17 150/74   09/15/17 146/86   05/15/17 144/80    Wt Readings from Last 3 Encounters:   10/04/17 271 lb 3.2 oz (123 kg)   09/15/17 269 lb (122 kg)   05/15/17 264 lb 8 oz (120 kg)                          Reviewed and updated as needed this visit by clinical staffTobacco  Allergies       Reviewed and updated as needed this visit by Provider         ROS:  Constitutional, HEENT, cardiovascular, pulmonary, gi and gu systems are negative, except as otherwise noted.      OBJECTIVE:   /74 (BP Location: Right arm, Patient Position: Sitting, Cuff Size: Adult Large)  Pulse 102  Resp 20  Ht 6' (1.829 m)  Wt 271 lb 3.2 oz (123 kg)  SpO2 97%  BMI 36.78 kg/m2  Body mass index is 36.78 kg/(m^2).  GENERAL: morbidly  obese  RESP: lungs clear to auscultation - no rales, rhonchi or wheezes  CV: regular rate and rhythm, normal S1 S2, no S3 or S4, no murmur, click or rub, no peripheral edema and peripheral pulses strong        ASSESSMENT/PLAN:               ICD-10-CM    1. Type 2 diabetes mellitus without complication, without long-term current use of insulin (H) E11.9 **A1C FUTURE anytime     **Basic metabolic panel FUTURE anytime     **Lipid panel reflex to direct LDL FUTURE anytime   2. Essential hypertension I10        6 month f/u    Anh Spivey NP  [facility]

## 2017-10-07 DIAGNOSIS — E11.9 TYPE 2 DIABETES MELLITUS WITHOUT COMPLICATION, WITHOUT LONG-TERM CURRENT USE OF INSULIN (H): ICD-10-CM

## 2017-10-07 LAB
ANION GAP SERPL CALCULATED.3IONS-SCNC: 9 MMOL/L (ref 3–14)
BUN SERPL-MCNC: 15 MG/DL (ref 7–30)
CALCIUM SERPL-MCNC: 9.1 MG/DL (ref 8.5–10.1)
CHLORIDE SERPL-SCNC: 107 MMOL/L (ref 94–109)
CHOLEST SERPL-MCNC: 151 MG/DL
CO2 SERPL-SCNC: 24 MMOL/L (ref 20–32)
CREAT SERPL-MCNC: 0.88 MG/DL (ref 0.66–1.25)
GFR SERPL CREATININE-BSD FRML MDRD: 85 ML/MIN/1.7M2
GLUCOSE SERPL-MCNC: 107 MG/DL (ref 70–99)
HBA1C MFR BLD: 6.3 % (ref 4.3–6)
HDLC SERPL-MCNC: 65 MG/DL
LDLC SERPL CALC-MCNC: 72 MG/DL
NONHDLC SERPL-MCNC: 86 MG/DL
POTASSIUM SERPL-SCNC: 4.3 MMOL/L (ref 3.4–5.3)
SODIUM SERPL-SCNC: 140 MMOL/L (ref 133–144)
TRIGL SERPL-MCNC: 68 MG/DL

## 2017-10-07 PROCEDURE — 36415 COLL VENOUS BLD VENIPUNCTURE: CPT | Performed by: NURSE PRACTITIONER

## 2017-10-07 PROCEDURE — 80061 LIPID PANEL: CPT | Performed by: NURSE PRACTITIONER

## 2017-10-07 PROCEDURE — 83036 HEMOGLOBIN GLYCOSYLATED A1C: CPT | Performed by: NURSE PRACTITIONER

## 2017-10-07 PROCEDURE — 80048 BASIC METABOLIC PNL TOTAL CA: CPT | Performed by: NURSE PRACTITIONER

## 2017-11-09 ENCOUNTER — TRANSFERRED RECORDS (OUTPATIENT)
Dept: HEALTH INFORMATION MANAGEMENT | Facility: CLINIC | Age: 72
End: 2017-11-09

## 2017-12-19 DIAGNOSIS — E11.9 TYPE 2 DIABETES MELLITUS WITHOUT COMPLICATION, WITHOUT LONG-TERM CURRENT USE OF INSULIN (H): ICD-10-CM

## 2017-12-19 DIAGNOSIS — J31.0 CHRONIC RHINITIS: ICD-10-CM

## 2017-12-20 RX ORDER — FLUTICASONE PROPIONATE 50 MCG
SPRAY, SUSPENSION (ML) NASAL
Qty: 48 G | Refills: 1 | Status: SHIPPED | OUTPATIENT
Start: 2017-12-20 | End: 2018-02-15

## 2017-12-28 ENCOUNTER — OFFICE VISIT (OUTPATIENT)
Dept: INTERNAL MEDICINE | Facility: CLINIC | Age: 72
End: 2017-12-28
Payer: COMMERCIAL

## 2017-12-28 VITALS
OXYGEN SATURATION: 97 % | BODY MASS INDEX: 36.03 KG/M2 | TEMPERATURE: 98.2 F | HEART RATE: 64 BPM | WEIGHT: 266 LBS | DIASTOLIC BLOOD PRESSURE: 80 MMHG | SYSTOLIC BLOOD PRESSURE: 160 MMHG | HEIGHT: 72 IN

## 2017-12-28 DIAGNOSIS — B96.89 BACTERIAL SKIN INFECTION: Primary | ICD-10-CM

## 2017-12-28 DIAGNOSIS — L08.9 BACTERIAL SKIN INFECTION: Primary | ICD-10-CM

## 2017-12-28 PROCEDURE — 99213 OFFICE O/P EST LOW 20 MIN: CPT | Performed by: INTERNAL MEDICINE

## 2017-12-28 RX ORDER — CLOTRIMAZOLE 1 %
CREAM (GRAM) TOPICAL 2 TIMES DAILY
COMMUNITY
End: 2018-02-15

## 2017-12-28 RX ORDER — CEPHALEXIN 500 MG/1
500 CAPSULE ORAL 2 TIMES DAILY
Qty: 14 CAPSULE | Refills: 0 | Status: SHIPPED | OUTPATIENT
Start: 2017-12-28 | End: 2018-02-15

## 2017-12-28 NOTE — PROGRESS NOTES
SUBJECTIVE:   Lance Ibrahim is a 72 year old male who presents to clinic today for the following health issues:    He has had a recurring rash in his right inguinal fold.  The past he received Lotrisone cream which usually works well.  About 8 days ago he started to have some redness and soreness and has been using Lotrisone but it is not helping.  It has been much more sore than usual and is having some yellowish purulent type exudate present.  The area is not getting larger, no fever or chills.      Patient Active Problem List   Diagnosis     Ventral hernia     Chronic rhinitis     Hypertrophy of prostate with urinary obstruction     Transient cerebral ischemia     HTN (hypertension)     CARDIOVASCULAR SCREENING; LDL GOAL LESS THAN 100     Advanced directives, counseling/discussion     Gout     Type 2 diabetes, HbA1c goal < 7% (H)     Obesity     Diabetes mellitus type 2, uncomplicated (H)     Benign essential hypertension     Cervicalgia     Current Outpatient Prescriptions   Medication Sig Dispense Refill     clotrimazole (LOTRIMIN) 1 % cream Apply topically 2 times daily       cephALEXin (KEFLEX) 500 MG capsule Take 1 capsule (500 mg) by mouth 2 times daily 14 capsule 0     metFORMIN (GLUCOPHAGE) 1000 MG tablet TAKE 1 TABLET (1,000 MG) BY MOUTH 2 TIMES DAILY (WITH MEALS) 180 tablet 1     lisinopril (PRINIVIL/ZESTRIL) 10 MG tablet TAKE 2 TABLETS (20 MG) BY MOUTH DAILY 180 tablet 3     aspirin 325 MG tablet Take by mouth daily       tamsulosin (FLOMAX) 0.4 MG 24 hr capsule Take 1 capsule by mouth daily.       Multiple Vitamins-Minerals (CENTRUM SILVER) per tablet Take 1 tablet by mouth daily.       fluticasone (FLONASE) 50 MCG/ACT spray SPRAY 1-2 SPRAYS INTO BOTH NOSTRILS DAILY 48 g 1     blood glucose monitoring (ACCU-CHEK FASTCLIX) lancets USE 1 LANCET TWICE A  each 1     ACCU-CHEK SMARTVIEW test strip TEST TWICE DAILY 200 strip 1     ferrous gluconate (FERGON) 324 (38 FE) MG tablet Take 1 tablet by  mouth daily.       propylene glycol (SYSTANE BALANCE) 0.6 % SOLN Apply  to eye as needed.       finasteride (PROSCAR) 5 MG tablet Take 5 mg by mouth daily.       naproxen sodium (ALEVE) 220 MG capsule Take 220 mg by mouth as needed.              Reviewed and updated as needed this visit by clinical staff  Tobacco  Allergies  Problems  Med Hx  Surg Hx  Fam Hx  Soc Hx      Reviewed and updated as needed this visit by Provider         ROS:  negative    OBJECTIVE:     /80 (BP Location: Right arm, Patient Position: Sitting, Cuff Size: Adult Large)  Pulse 64  Temp 98.2  F (36.8  C) (Oral)  Ht 6' (1.829 m)  Wt 266 lb (120.7 kg)  SpO2 97%  BMI 36.08 kg/m2  Body mass index is 36.08 kg/(m^2).    There is an erythematous patch in the right inguinal fold about 2.5 cm long and 1 cm wide with superficial ulceration, yellow, cloudy exudate present.  There is no area of abscess or fluctuance, no extending erythema consistent with cellulitis.      ASSESSMENT/PLAN:         1. Bacterial skin infection  This appears to be a bacterial superinfection, may have been an underlying yeast rash.  The box his Lotrisone came and suggested that  in  so it may not be effective anymore.  Will treat with an oral antibiotic.  Advised that he can use over-the-counter clotrimazole cream for the yeast rash and if necessary and a little hydrocortisone.  Follow-up for any significant fever or spreading erythema.  - cephALEXin (KEFLEX) 500 MG capsule; Take 1 capsule (500 mg) by mouth 2 times daily  Dispense: 14 capsule; Refill: 0        Shyla Barrett MD  [facility]

## 2017-12-28 NOTE — NURSING NOTE
Chief Complaint   Patient presents with     Groin Pain       Initial /80 (BP Location: Right arm, Patient Position: Sitting, Cuff Size: Adult Large)  Pulse 64  Temp 98.2  F (36.8  C) (Oral)  Ht 6' (1.829 m)  Wt 266 lb (120.7 kg)  SpO2 97%  BMI 36.08 kg/m2 Estimated body mass index is 36.08 kg/(m^2) as calculated from the following:    Height as of this encounter: 6' (1.829 m).    Weight as of this encounter: 266 lb (120.7 kg).  Medication Reconciliation: complete

## 2017-12-28 NOTE — MR AVS SNAPSHOT
"              After Visit Summary   2017    Lance Ibrahim    MRN: [id number]           Patient Information     Date Of Birth          1945        Visit Information        Provider Department      2017 4:40 PM Shyla Barrett MD [facility]        Today's Diagnoses     Bacterial skin infection    -  1      Care Instructions    Clotrimazole brand name is Lortimin.   You can use some hydrocortisone with it.           Follow-ups after your visit        Who to contact     If you have questions or need follow up information about today's clinic visit or your schedule please contact [facility] directly at [phone].  Normal or non-critical lab and imaging results will be communicated to you by [employer] Lab[employer]art, letter or phone within 4 business days after the clinic has received the results. If you do not hear from us within 7 days, please contact the clinic through [employer]shart or phone. If you have a critical or abnormal lab result, we will notify you by phone as soon as possible.  Submit refill requests through [employer] or call your pharmacy and they will forward the refill request to us. Please allow 3 business days for your refill to be completed.          Additional Information About Your Visit        MyChart Information     [employer] lets you send messages to your doctor, view your test results, renew your prescriptions, schedule appointments and more. To sign up, go to [email or web address] . Click on \"Log in\" on the left side of the screen, which will take you to the Welcome page. Then click on \"Sign up Now\" on the right side of the page.     You will be asked to enter the access code listed below, as well as some personal information. Please follow the directions to create your username and password.     Your access code is: [email or web address]  Expires: 3/28/2018  5:07 PM     Your access code will  in 90 days. If you need help or a new code, please call your [facility] or " [phone].        Care EveryWhere ID     This is your Care EveryWhere ID. This could be used by other organizations to access your [place] medical records  [id number]        Your Vitals Were     Pulse Temperature Height Pulse Oximetry BMI (Body Mass Index)       64 98.2  F (36.8  C) (Oral) 6' (1.829 m) 97% 36.08 kg/m2        Blood Pressure from Last 3 Encounters:   12/28/17 160/80   10/04/17 150/74   09/15/17 146/86    Weight from Last 3 Encounters:   12/28/17 266 lb (120.7 kg)   10/04/17 271 lb 3.2 oz (123 kg)   09/15/17 269 lb (122 kg)              Today, you had the following     No orders found for display         Today's Medication Changes          These changes are accurate as of: 12/28/17  5:07 PM.  If you have any questions, ask your nurse or doctor.               Start taking these medicines.        Dose/Directions    cephALEXin 500 MG capsule   Commonly known as:  KEFLEX   Used for:  Bacterial skin infection   Started by:  Shyla Barrett MD        Dose:  500 mg   Take 1 capsule (500 mg) by mouth 2 times daily   Quantity:  14 capsule   Refills:  0            Where to get your medicines      These medications were sent to [facility]/pharmacy #[id number] - [facility] [address]  [address] [facility] [address]     Phone:  [phone]     cephALEXin 500 MG capsule                Primary Care Provider Office Phone # Fax #    Anh Spivey [phone]       [address] [facility] [address]        Equal Access to Services     [facility] HospitalKRISTI : Hadii jacqui esteevz Soivan, waaxda luqadaha, qaybta kaalmabran ratliffOchsner Medical Centermarni bran. So [facility] [phone].    ATENCIÓN: Si habla español, tiene a kaiser disposición servicios gratuitos de asistencia lingüística. Llame al [phone].    We comply with applicable federal civil rights laws and [place] laws. We do not discriminate on the basis of race, color, national origin, age, disability, sex,  sexual orientation, or gender identity.            Thank you!     Thank you for choosing [facility]  for your care. Our goal is always to provide you with excellent care. Hearing back from our patients is one way we can continue to improve our services. Please take a few minutes to complete the written survey that you may receive in the mail after your visit with us. Thank you!             Your Updated Medication List - Protect others around you: Learn how to safely use, store and throw away your medicines at www.disposemymeds.org.          This list is accurate as of: 12/28/17  5:07 PM.  Always use your most recent med list.                   Brand Name Dispense Instructions for use Diagnosis    ACCU-CHEK SMARTVIEW test strip   Generic drug:  blood glucose monitoring     200 strip    TEST TWICE DAILY    Type 2 diabetes mellitus (H)       ALEVE 220 MG capsule   Generic drug:  naproxen sodium      Take 220 mg by mouth as needed.        aspirin 325 MG tablet      Take by mouth daily        blood glucose monitoring lancets     204 each    USE 1 LANCET TWICE A DAY    Type 2 diabetes mellitus (H)       CENTRUM SILVER per tablet      Take 1 tablet by mouth daily.        cephALEXin 500 MG capsule    KEFLEX    14 capsule    Take 1 capsule (500 mg) by mouth 2 times daily    Bacterial skin infection       clotrimazole 1 % cream    LOTRIMIN     Apply topically 2 times daily        ferrous gluconate 324 (38 FE) MG tablet    FERGON     Take 1 tablet by mouth daily.        finasteride 5 MG tablet    PROSCAR     Take 5 mg by mouth daily.        fluticasone 50 MCG/ACT spray    FLONASE    48 g    SPRAY 1-2 SPRAYS INTO BOTH NOSTRILS DAILY    Chronic rhinitis       lisinopril 10 MG tablet    PRINIVIL/ZESTRIL    180 tablet    TAKE 2 TABLETS (20 MG) BY MOUTH DAILY    Essential hypertension       metFORMIN 1000 MG tablet    GLUCOPHAGE    180 tablet    TAKE 1 TABLET (1,000 MG) BY MOUTH 2 TIMES DAILY (WITH MEALS)    Type 2  diabetes mellitus without complication, without long-term current use of insulin (H)       SYSTANE BALANCE 0.6 % Soln ophthalmic solution   Generic drug:  propylene glycol      Apply  to eye as needed.        tamsulosin 0.4 MG capsule    FLOMAX     Take 1 capsule by mouth daily.

## 2018-02-11 DIAGNOSIS — E11.9 TYPE 2 DIABETES MELLITUS (H): ICD-10-CM

## 2018-02-15 ENCOUNTER — OFFICE VISIT (OUTPATIENT)
Dept: INTERNAL MEDICINE | Facility: CLINIC | Age: 73
End: 2018-02-15
Payer: COMMERCIAL

## 2018-02-15 VITALS
HEART RATE: 105 BPM | OXYGEN SATURATION: 97 % | BODY MASS INDEX: 36.11 KG/M2 | TEMPERATURE: 97.3 F | SYSTOLIC BLOOD PRESSURE: 136 MMHG | WEIGHT: 266.6 LBS | DIASTOLIC BLOOD PRESSURE: 70 MMHG | RESPIRATION RATE: 16 BRPM | HEIGHT: 72 IN

## 2018-02-15 DIAGNOSIS — J31.0 CHRONIC RHINITIS, UNSPECIFIED TYPE: ICD-10-CM

## 2018-02-15 DIAGNOSIS — I10 BENIGN ESSENTIAL HYPERTENSION: ICD-10-CM

## 2018-02-15 DIAGNOSIS — E11.9 TYPE 2 DIABETES MELLITUS WITHOUT COMPLICATION, WITHOUT LONG-TERM CURRENT USE OF INSULIN (H): Primary | ICD-10-CM

## 2018-02-15 PROCEDURE — 99214 OFFICE O/P EST MOD 30 MIN: CPT | Performed by: NURSE PRACTITIONER

## 2018-02-15 RX ORDER — BLOOD SUGAR DIAGNOSTIC
STRIP MISCELLANEOUS
Qty: 200 STRIP | Refills: 1 | Status: SHIPPED | OUTPATIENT
Start: 2018-02-15 | End: 2018-05-01

## 2018-02-15 RX ORDER — FLUTICASONE PROPIONATE 50 MCG
SPRAY, SUSPENSION (ML) NASAL
Qty: 48 G | Refills: 1 | Status: SHIPPED | OUTPATIENT
Start: 2018-02-15 | End: 2018-10-14

## 2018-02-15 RX ORDER — LORATADINE 10 MG/1
10 TABLET ORAL AT BEDTIME
COMMUNITY
End: 2022-08-25

## 2018-02-15 RX ORDER — LANCETS
1 EACH MISCELLANEOUS 2 TIMES DAILY
Qty: 204 EACH | Refills: 1 | Status: SHIPPED | OUTPATIENT
Start: 2018-02-15 | End: 2018-05-01

## 2018-02-15 NOTE — NURSING NOTE
Chief Complaint   Patient presents with     Follow Up For     HTN and Diabetes       Initial /70 (BP Location: Right arm, Patient Position: Sitting, Cuff Size: Adult Large)  Pulse 105  Temp 97.3  F (36.3  C) (Oral)  Resp 16  Ht 6' (1.829 m)  Wt 266 lb 9.6 oz (120.9 kg)  SpO2 97%  BMI 36.16 kg/m2 Estimated body mass index is 36.16 kg/(m^2) as calculated from the following:    Height as of this encounter: 6' (1.829 m).    Weight as of this encounter: 266 lb 9.6 oz (120.9 kg).  Medication Reconciliation: complete

## 2018-02-15 NOTE — MR AVS SNAPSHOT
"              After Visit Summary   2/15/2018    Lance Ibrahim    MRN: [id number]           Patient Information     Date Of Birth          1945        Visit Information        Provider Department      2/15/2018 4:00 PM Anh Spivey NP [facility]        Today's Diagnoses     Type 2 diabetes mellitus without complication, without long-term current use of insulin (H)    -  1    Benign essential hypertension        Chronic rhinitis, unspecified type           Follow-ups after your visit        Who to contact     If you have questions or need follow up information about today's clinic visit or your schedule please contact [facility] directly at [phone].  Normal or non-critical lab and imaging results will be communicated to you by 500Indieshart, letter or phone within 4 business days after the clinic has received the results. If you do not hear from us within 7 days, please contact the clinic through 500Indieshart or phone. If you have a critical or abnormal lab result, we will notify you by phone as soon as possible.  Submit refill requests through [employer] or call your pharmacy and they will forward the refill request to us. Please allow 3 business days for your refill to be completed.          Additional Information About Your Visit        MyChart Information     [employer] lets you send messages to your doctor, view your test results, renew your prescriptions, schedule appointments and more. To sign up, go to [email or web address] . Click on \"Log in\" on the left side of the screen, which will take you to the Welcome page. Then click on \"Sign up Now\" on the right side of the page.     You will be asked to enter the access code listed below, as well as some personal information. Please follow the directions to create your username and password.     Your access code is: [email or web address]  Expires: 3/28/2018  5:07 PM     Your access code will  in 90 days. If you need help or a new " code, please call your [place] clinic or [phone].        Care EveryWhere ID     This is your Care EveryWhere ID. This could be used by other organizations to access your [place] medical records  [id number]        Your Vitals Were     Pulse Temperature Respirations Height Pulse Oximetry BMI (Body Mass Index)    105 97.3  F (36.3  C) (Oral) 16 6' (1.829 m) 97% 36.16 kg/m2       Blood Pressure from Last 3 Encounters:   02/15/18 136/70   12/28/17 160/80   10/04/17 150/74    Weight from Last 3 Encounters:   02/15/18 266 lb 9.6 oz (120.9 kg)   12/28/17 266 lb (120.7 kg)   10/04/17 271 lb 3.2 oz (123 kg)              Today, you had the following     No orders found for display         Today's Medication Changes          These changes are accurate as of 2/15/18  4:33 PM.  If you have any questions, ask your nurse or doctor.               These medicines have changed or have updated prescriptions.        Dose/Directions    fluticasone 50 MCG/ACT spray   Commonly known as:  FLONASE   This may have changed:  See the new instructions.   Used for:  Chronic rhinitis, unspecified type   Changed by:  Anh Spivey NP        SPRAY 1-2 SPRAYS INTO BOTH NOSTRILS DAILY   Quantity:  48 g   Refills:  1            Where to get your medicines      These medications were sent to [facility]/pharmacy #[id number] - [facility] [address]  [address] [facility] [address]     Phone:  [phone]     fluticasone 50 MCG/ACT spray                Primary Care Provider Office Phone # Fax #    Anh Spivey [phone]       [address] [facility] [address]        Equal Access to Services     GHASSAN BURGESS : Mike Corral, cassia fernando, avis kaalmaterrie eaton, ashley bran. So [facility] [phone].    ATENCIÓN: Si habla español, tiene a kaiser disposición servicios gratuitos de asistencia lingüística. Llame al [phone].    We comply with  applicable federal civil rights laws and [place] laws. We do not discriminate on the basis of race, color, national origin, age, disability, sex, sexual orientation, or gender identity.            Thank you!     Thank you for choosing [facility]  for your care. Our goal is always to provide you with excellent care. Hearing back from our patients is one way we can continue to improve our services. Please take a few minutes to complete the written survey that you may receive in the mail after your visit with us. Thank you!             Your Updated Medication List - Protect others around you: Learn how to safely use, store and throw away your medicines at www.disposemymeds.org.          This list is accurate as of 2/15/18  4:33 PM.  Always use your most recent med list.                   Brand Name Dispense Instructions for use Diagnosis    ACCU-CHEK SMARTVIEW test strip   Generic drug:  blood glucose monitoring     200 strip    USE TO TEST TWICE DAILY    Type 2 diabetes mellitus (H)       ALEVE 220 MG capsule   Generic drug:  naproxen sodium      Take 220 mg by mouth as needed.        aspirin 325 MG tablet      Take by mouth daily        blood glucose monitoring lancets     204 each    1 each by In Vitro route 2 times daily    Type 2 diabetes mellitus (H)       CENTRUM SILVER per tablet      Take 1 tablet by mouth daily.        ferrous gluconate 324 (38 FE) MG tablet    FERGON     Take 1 tablet by mouth daily.        finasteride 5 MG tablet    PROSCAR     Take 5 mg by mouth daily.        fluticasone 50 MCG/ACT spray    FLONASE    48 g    SPRAY 1-2 SPRAYS INTO BOTH NOSTRILS DAILY    Chronic rhinitis, unspecified type       lisinopril 10 MG tablet    PRINIVIL/ZESTRIL    180 tablet    TAKE 2 TABLETS (20 MG) BY MOUTH DAILY    Essential hypertension       loratadine 10 MG tablet    CLARITIN     Take 10 mg by mouth At Bedtime        metFORMIN 1000 MG tablet    GLUCOPHAGE    180 tablet    TAKE 1 TABLET  (1,000 MG) BY MOUTH 2 TIMES DAILY (WITH MEALS)    Type 2 diabetes mellitus without complication, without long-term current use of insulin (H)       SYSTANE BALANCE 0.6 % Soln ophthalmic solution   Generic drug:  propylene glycol      Place 1 drop into both eyes as needed        tamsulosin 0.4 MG capsule    FLOMAX     Take 1 capsule by mouth daily.

## 2018-02-15 NOTE — PROGRESS NOTES
SUBJECTIVE:   Lance Ibrahim is a 72 year old male who presents to clinic today for the following health issues:      Diabetes Follow-up    Patient is checking blood sugars: twice daily.    Blood sugar testing frequency justification: On insulin, frequency appropriate   Results are as follows:         am - 105-140         bedtime - 111-170    Diabetic concerns: None     Symptoms of hypoglycemia (low blood sugar): none     Paresthesias (numbness or burning in feet) or sores: No     Date of last diabetic eye exam: 04/2017    BP Readings from Last 2 Encounters:   02/15/18 136/70   12/28/17 160/80     Hemoglobin A1C (%)   Date Value   10/07/2017 6.3 (H)   05/15/2017 6.3 (H)     LDL Cholesterol Calculated (mg/dL)   Date Value   10/07/2017 72   05/15/2017 86     Hypertension Follow-up      Outpatient blood pressures are not being checked.    Low Salt Diet: not monitoring salt      Amount of exercise or physical activity: None    Problems taking medications regularly: No    Medication side effects: none    Diet: regular (no restrictions)            Problem list and histories reviewed & adjusted, as indicated.  Additional history: as documented    Patient Active Problem List   Diagnosis     Ventral hernia     Chronic rhinitis     Hypertrophy of prostate with urinary obstruction     Transient cerebral ischemia     HTN (hypertension)     CARDIOVASCULAR SCREENING; LDL GOAL LESS THAN 100     Advanced directives, counseling/discussion     Gout     Type 2 diabetes, HbA1c goal < 7% (H)     Obesity     Diabetes mellitus type 2, uncomplicated (H)     Benign essential hypertension     Cervicalgia     Past Surgical History:   Procedure Laterality Date     C NONSPECIFIC PROCEDURE  1985    Diastasis recti repair     C NONSPECIFIC PROCEDURE  1987    Ventral hernia repair     C NONSPECIFIC PROCEDURE      ORIF of left shoulder     COLONOSCOPY  3/9/2013    Procedure: COLONOSCOPY;  COLONOSCOPY;  Surgeon: Chau Hogan MD;  Location: [facility]      EYE SURGERY  2017    left eye     FOOT SURGERY  2013    cyst removal      HERNIA REPAIR  2004    ventral        Social History   Substance Use Topics     Smoking status: Former Smoker     Quit date: 3/17/1993     Smokeless tobacco: Never Used     Alcohol use Yes      Comment: Occ, Once a month     Family History   Problem Relation Age of Onset     Family History Negative Mother       88 yo     CANCER Father       76 yo brain     DIABETES Maternal Grandfather       89 yo     Alcohol/Drug Paternal Grandfather               Current Outpatient Prescriptions   Medication Sig Dispense Refill     blood glucose monitoring (ACCU-CHEK FASTCLIX) lancets 1 each by In Vitro route 2 times daily 204 each 1     ACCU-CHEK SMARTVIEW test strip USE TO TEST TWICE DAILY 200 strip 1     loratadine (CLARITIN) 10 MG tablet Take 10 mg by mouth At Bedtime       fluticasone (FLONASE) 50 MCG/ACT spray SPRAY 1-2 SPRAYS INTO BOTH NOSTRILS DAILY 48 g 1     metFORMIN (GLUCOPHAGE) 1000 MG tablet TAKE 1 TABLET (1,000 MG) BY MOUTH 2 TIMES DAILY (WITH MEALS) 180 tablet 1     lisinopril (PRINIVIL/ZESTRIL) 10 MG tablet TAKE 2 TABLETS (20 MG) BY MOUTH DAILY 180 tablet 3     aspirin 325 MG tablet Take by mouth daily       ferrous gluconate (FERGON) 324 (38 FE) MG tablet Take 1 tablet by mouth daily.       propylene glycol (SYSTANE BALANCE) 0.6 % SOLN Place 1 drop into both eyes as needed        tamsulosin (FLOMAX) 0.4 MG 24 hr capsule Take 1 capsule by mouth daily.       finasteride (PROSCAR) 5 MG tablet Take 5 mg by mouth daily.       Multiple Vitamins-Minerals (CENTRUM SILVER) per tablet Take 1 tablet by mouth daily.       naproxen sodium (ALEVE) 220 MG capsule Take 220 mg by mouth as needed.       Recent Labs   Lab Test  10/07/17   1036  05/15/17   1714  10/22/16   2052  10/12/16   1617  16   0945   11/10/14   1343  14   0927  14   0931   12   0804   A1C  6.3*  6.3*   --   5.6   6.1*   < >   --   5.6  5.9   < >   --    LDL  72  86   --    --   112*   < >   --   73  101   --    --    HDL  65  53   --    --   62   < >   --   76  46   --    --    TRIG  68  99   --    --   77   < >   --   62  81   --    --    ALT   --    --   28   --    --    --    --   27  27   --    --    CR  0.88  0.88  1.06  0.94  0.86   < >   --   0.83  0.85   < >   --    GFRESTIMATED  85  85  69  79  88   < >   --   >90  Non  GFR Calc    89   < >   --    GFRESTBLACK  >90  >90  African American GFR Calc    83  >90   GFR Calc    >90   GFR Calc     < >   --   >90   GFR Calc    >90   < >   --    POTASSIUM  4.3  3.9  4.1  4.3  4.9   < >   --   4.5  4.3   < >   --    TSH   --    --    --    --    --    --   3.90   --    --    --   3.10    < > = values in this interval not displayed.      BP Readings from Last 3 Encounters:   02/15/18 136/70   12/28/17 160/80   10/04/17 150/74    Wt Readings from Last 3 Encounters:   02/15/18 266 lb 9.6 oz (120.9 kg)   12/28/17 266 lb (120.7 kg)   10/04/17 271 lb 3.2 oz (123 kg)                    Reviewed and updated as needed this visit by clinical staff  Tobacco  Allergies  Meds  Med Hx  Surg Hx  Fam Hx  Soc Hx      Reviewed and updated as needed this visit by Provider         ROS:  Constitutional, HEENT, cardiovascular, pulmonary, gi and gu systems are negative, except as otherwise noted.    OBJECTIVE:     /70 (BP Location: Right arm, Patient Position: Sitting, Cuff Size: Adult Large)  Pulse 105  Temp 97.3  F (36.3  C) (Oral)  Resp 16  Ht 6' (1.829 m)  Wt 266 lb 9.6 oz (120.9 kg)  SpO2 97%  BMI 36.16 kg/m2  Body mass index is 36.16 kg/(m^2).  GENERAL: morbidly obese  NECK: no adenopathy, no asymmetry, masses, or scars and thyroid normal to palpation  RESP: lungs clear to auscultation - no rales, rhonchi or wheezes  CV: regular rate and rhythm, normal S1 S2, no S3 or S4, no murmur, click or rub, no peripheral  edema and peripheral pulses strong        ASSESSMENT/PLAN:     (E11.9) Type 2 diabetes mellitus without complication, without long-term current use of insulin (H)  (primary encounter diagnosis)  Comment:   Plan: stable, f/u 3 months    (I10) Benign essential hypertension  Comment:   Plan: stable    (J31.0) Chronic rhinitis, unspecified type  Comment:   Plan: fluticasone (FLONASE) 50 MCG/ACT spray            Anh Spivey NP  [facility]

## 2018-03-02 ENCOUNTER — TRANSFERRED RECORDS (OUTPATIENT)
Dept: HEALTH INFORMATION MANAGEMENT | Facility: CLINIC | Age: 73
End: 2018-03-02

## 2018-04-02 ENCOUNTER — TRANSFERRED RECORDS (OUTPATIENT)
Dept: HEALTH INFORMATION MANAGEMENT | Facility: CLINIC | Age: 73
End: 2018-04-02

## 2018-04-20 ENCOUNTER — TRANSFERRED RECORDS (OUTPATIENT)
Dept: HEALTH INFORMATION MANAGEMENT | Facility: CLINIC | Age: 73
End: 2018-04-20

## 2018-05-01 ENCOUNTER — APPOINTMENT (OUTPATIENT)
Dept: GENERAL RADIOLOGY | Facility: CLINIC | Age: 73
DRG: 872 | End: 2018-05-01
Attending: EMERGENCY MEDICINE
Payer: COMMERCIAL

## 2018-05-01 ENCOUNTER — APPOINTMENT (OUTPATIENT)
Dept: CT IMAGING | Facility: CLINIC | Age: 73
DRG: 872 | End: 2018-05-01
Attending: EMERGENCY MEDICINE
Payer: COMMERCIAL

## 2018-05-01 ENCOUNTER — TELEPHONE (OUTPATIENT)
Dept: INTERNAL MEDICINE | Facility: CLINIC | Age: 73
End: 2018-05-01

## 2018-05-01 ENCOUNTER — HOSPITAL ENCOUNTER (INPATIENT)
Facility: CLINIC | Age: 73
LOS: 3 days | Discharge: HOME | DRG: 872 | End: 2018-05-04
Attending: EMERGENCY MEDICINE | Admitting: INTERNAL MEDICINE
Payer: COMMERCIAL

## 2018-05-01 DIAGNOSIS — L03.116 LEFT LEG CELLULITIS: Primary | ICD-10-CM

## 2018-05-01 DIAGNOSIS — M62.81 GENERALIZED MUSCLE WEAKNESS: ICD-10-CM

## 2018-05-01 DIAGNOSIS — R42 DIZZINESS: ICD-10-CM

## 2018-05-01 PROBLEM — A41.9 SEPSIS (H): Status: ACTIVE | Noted: 2018-05-01

## 2018-05-01 LAB
ALBUMIN SERPL-MCNC: 3.2 G/DL (ref 3.4–5)
ALBUMIN UR-MCNC: NEGATIVE MG/DL
ALP SERPL-CCNC: 67 U/L (ref 40–150)
ALT SERPL W P-5'-P-CCNC: 30 U/L (ref 0–70)
ANION GAP SERPL CALCULATED.3IONS-SCNC: 10 MMOL/L (ref 3–14)
APPEARANCE UR: CLEAR
AST SERPL W P-5'-P-CCNC: 14 U/L (ref 0–45)
BASOPHILS # BLD AUTO: 0 10E9/L (ref 0–0.2)
BASOPHILS NFR BLD AUTO: 0.1 %
BILIRUB SERPL-MCNC: 0.1 MG/DL (ref 0.2–1.3)
BILIRUB UR QL STRIP: NEGATIVE
BUN SERPL-MCNC: 19 MG/DL (ref 7–30)
CALCIUM SERPL-MCNC: 8.8 MG/DL (ref 8.5–10.1)
CHLORIDE SERPL-SCNC: 103 MMOL/L (ref 94–109)
CO2 BLDCOV-SCNC: 24 MMOL/L (ref 21–28)
CO2 BLDCOV-SCNC: 25 MMOL/L (ref 21–28)
CO2 SERPL-SCNC: 24 MMOL/L (ref 20–32)
COLOR UR AUTO: YELLOW
CREAT SERPL-MCNC: 0.93 MG/DL (ref 0.66–1.25)
CREAT SERPL-MCNC: 1.06 MG/DL (ref 0.66–1.25)
DEPRECATED S PYO AG THROAT QL EIA: NORMAL
DIFFERENTIAL METHOD BLD: ABNORMAL
EOSINOPHIL # BLD AUTO: 0 10E9/L (ref 0–0.7)
EOSINOPHIL NFR BLD AUTO: 0.1 %
ERYTHROCYTE [DISTWIDTH] IN BLOOD BY AUTOMATED COUNT: 15.8 % (ref 10–15)
FLUAV+FLUBV AG SPEC QL: NEGATIVE
FLUAV+FLUBV AG SPEC QL: NEGATIVE
GFR SERPL CREATININE-BSD FRML MDRD: 68 ML/MIN/1.7M2
GFR SERPL CREATININE-BSD FRML MDRD: 80 ML/MIN/1.7M2
GLUCOSE BLDC GLUCOMTR-MCNC: 151 MG/DL (ref 70–99)
GLUCOSE BLDC GLUCOMTR-MCNC: 184 MG/DL (ref 70–99)
GLUCOSE SERPL-MCNC: 106 MG/DL (ref 70–99)
GLUCOSE UR STRIP-MCNC: NEGATIVE MG/DL
HBA1C MFR BLD: 6.7 % (ref 0–6.4)
HCT VFR BLD AUTO: 41 % (ref 40–53)
HGB BLD-MCNC: 13.1 G/DL (ref 13.3–17.7)
HGB UR QL STRIP: NEGATIVE
IMM GRANULOCYTES # BLD: 0.3 10E9/L (ref 0–0.4)
IMM GRANULOCYTES NFR BLD: 1.4 %
INTERPRETATION ECG - MUSE: NORMAL
KETONES UR STRIP-MCNC: NEGATIVE MG/DL
LACTATE BLD-SCNC: 2.6 MMOL/L (ref 0.7–2.1)
LACTATE BLD-SCNC: 3.4 MMOL/L (ref 0.7–2.1)
LEUKOCYTE ESTERASE UR QL STRIP: NEGATIVE
LYMPHOCYTES # BLD AUTO: 1 10E9/L (ref 0.8–5.3)
LYMPHOCYTES NFR BLD AUTO: 4.8 %
MAGNESIUM SERPL-MCNC: 1.8 MG/DL (ref 1.6–2.3)
MCH RBC QN AUTO: 29.8 PG (ref 26.5–33)
MCHC RBC AUTO-ENTMCNC: 32 G/DL (ref 31.5–36.5)
MCV RBC AUTO: 93 FL (ref 78–100)
MONOCYTES # BLD AUTO: 1.4 10E9/L (ref 0–1.3)
MONOCYTES NFR BLD AUTO: 6.7 %
MUCOUS THREADS #/AREA URNS LPF: PRESENT /LPF
NEUTROPHILS # BLD AUTO: 18.3 10E9/L (ref 1.6–8.3)
NEUTROPHILS NFR BLD AUTO: 86.9 %
NITRATE UR QL: NEGATIVE
NRBC # BLD AUTO: 0 10*3/UL
NRBC BLD AUTO-RTO: 0 /100
PCO2 BLDV: 38 MM HG (ref 40–50)
PCO2 BLDV: 40 MM HG (ref 40–50)
PH BLDV: 7.37 PH (ref 7.32–7.43)
PH BLDV: 7.43 PH (ref 7.32–7.43)
PH UR STRIP: 5 PH (ref 5–7)
PLATELET # BLD AUTO: 193 10E9/L (ref 150–450)
PLATELET # BLD AUTO: 240 10E9/L (ref 150–450)
PO2 BLDV: 20 MM HG (ref 25–47)
PO2 BLDV: 22 MM HG (ref 25–47)
POTASSIUM SERPL-SCNC: 4.4 MMOL/L (ref 3.4–5.3)
PROT SERPL-MCNC: 7.1 G/DL (ref 6.8–8.8)
RBC # BLD AUTO: 4.4 10E12/L (ref 4.4–5.9)
RBC #/AREA URNS AUTO: 1 /HPF (ref 0–2)
SAO2 % BLDV FROM PO2: 35 %
SAO2 % BLDV FROM PO2: 35 %
SODIUM SERPL-SCNC: 137 MMOL/L (ref 133–144)
SOURCE: ABNORMAL
SP GR UR STRIP: 1.02 (ref 1–1.03)
SPECIMEN SOURCE: NORMAL
SPECIMEN SOURCE: NORMAL
SQUAMOUS #/AREA URNS AUTO: <1 /HPF (ref 0–1)
TROPONIN I BLD-MCNC: 0 UG/L (ref 0–0.1)
TSH SERPL DL<=0.005 MIU/L-ACNC: 2.19 MU/L (ref 0.4–4)
UROBILINOGEN UR STRIP-MCNC: 0 MG/DL (ref 0–2)
WBC # BLD AUTO: 21.1 10E9/L (ref 4–11)
WBC #/AREA URNS AUTO: <1 /HPF (ref 0–5)

## 2018-05-01 PROCEDURE — 12000000 ZZH R&B MED SURG/OB

## 2018-05-01 PROCEDURE — 96361 HYDRATE IV INFUSION ADD-ON: CPT

## 2018-05-01 PROCEDURE — 87880 STREP A ASSAY W/OPTIC: CPT | Performed by: EMERGENCY MEDICINE

## 2018-05-01 PROCEDURE — 00000146 ZZHCL STATISTIC GLUCOSE BY METER IP

## 2018-05-01 PROCEDURE — 87077 CULTURE AEROBIC IDENTIFY: CPT | Performed by: EMERGENCY MEDICINE

## 2018-05-01 PROCEDURE — 71046 X-RAY EXAM CHEST 2 VIEWS: CPT

## 2018-05-01 PROCEDURE — 83735 ASSAY OF MAGNESIUM: CPT | Performed by: EMERGENCY MEDICINE

## 2018-05-01 PROCEDURE — 85049 AUTOMATED PLATELET COUNT: CPT | Performed by: INTERNAL MEDICINE

## 2018-05-01 PROCEDURE — 83605 ASSAY OF LACTIC ACID: CPT

## 2018-05-01 PROCEDURE — 80053 COMPREHEN METABOLIC PANEL: CPT | Performed by: EMERGENCY MEDICINE

## 2018-05-01 PROCEDURE — 87804 INFLUENZA ASSAY W/OPTIC: CPT | Performed by: EMERGENCY MEDICINE

## 2018-05-01 PROCEDURE — 83036 HEMOGLOBIN GLYCOSYLATED A1C: CPT | Performed by: INTERNAL MEDICINE

## 2018-05-01 PROCEDURE — 82803 BLOOD GASES ANY COMBINATION: CPT

## 2018-05-01 PROCEDURE — 84443 ASSAY THYROID STIM HORMONE: CPT | Performed by: EMERGENCY MEDICINE

## 2018-05-01 PROCEDURE — 25000128 H RX IP 250 OP 636: Performed by: INTERNAL MEDICINE

## 2018-05-01 PROCEDURE — 93005 ELECTROCARDIOGRAM TRACING: CPT

## 2018-05-01 PROCEDURE — 87081 CULTURE SCREEN ONLY: CPT | Performed by: EMERGENCY MEDICINE

## 2018-05-01 PROCEDURE — 96374 THER/PROPH/DIAG INJ IV PUSH: CPT

## 2018-05-01 PROCEDURE — 85025 COMPLETE CBC W/AUTO DIFF WBC: CPT | Performed by: EMERGENCY MEDICINE

## 2018-05-01 PROCEDURE — 25000128 H RX IP 250 OP 636: Performed by: EMERGENCY MEDICINE

## 2018-05-01 PROCEDURE — 36415 COLL VENOUS BLD VENIPUNCTURE: CPT | Performed by: EMERGENCY MEDICINE

## 2018-05-01 PROCEDURE — 84484 ASSAY OF TROPONIN QUANT: CPT

## 2018-05-01 PROCEDURE — 87040 BLOOD CULTURE FOR BACTERIA: CPT | Performed by: EMERGENCY MEDICINE

## 2018-05-01 PROCEDURE — 36415 COLL VENOUS BLD VENIPUNCTURE: CPT | Performed by: INTERNAL MEDICINE

## 2018-05-01 PROCEDURE — 25000125 ZZHC RX 250: Performed by: EMERGENCY MEDICINE

## 2018-05-01 PROCEDURE — 82565 ASSAY OF CREATININE: CPT | Performed by: INTERNAL MEDICINE

## 2018-05-01 PROCEDURE — 70450 CT HEAD/BRAIN W/O DYE: CPT

## 2018-05-01 PROCEDURE — 87086 URINE CULTURE/COLONY COUNT: CPT | Performed by: EMERGENCY MEDICINE

## 2018-05-01 PROCEDURE — 99285 EMERGENCY DEPT VISIT HI MDM: CPT | Mod: 25

## 2018-05-01 PROCEDURE — 99223 1ST HOSP IP/OBS HIGH 75: CPT | Mod: AI | Performed by: INTERNAL MEDICINE

## 2018-05-01 PROCEDURE — 81001 URINALYSIS AUTO W/SCOPE: CPT | Performed by: EMERGENCY MEDICINE

## 2018-05-01 RX ORDER — CLINDAMYCIN PHOSPHATE 900 MG/50ML
900 INJECTION, SOLUTION INTRAVENOUS EVERY 8 HOURS
Status: DISCONTINUED | OUTPATIENT
Start: 2018-05-02 | End: 2018-05-04 | Stop reason: HOSPADM

## 2018-05-01 RX ORDER — HYDROCODONE BITARTRATE AND ACETAMINOPHEN 5; 325 MG/1; MG/1
1-2 TABLET ORAL EVERY 4 HOURS PRN
Status: DISCONTINUED | OUTPATIENT
Start: 2018-05-01 | End: 2018-05-04 | Stop reason: HOSPADM

## 2018-05-01 RX ORDER — CLINDAMYCIN PHOSPHATE 900 MG/50ML
900 INJECTION, SOLUTION INTRAVENOUS ONCE
Status: COMPLETED | OUTPATIENT
Start: 2018-05-01 | End: 2018-05-01

## 2018-05-01 RX ORDER — ONDANSETRON 2 MG/ML
4 INJECTION INTRAMUSCULAR; INTRAVENOUS EVERY 6 HOURS PRN
Status: DISCONTINUED | OUTPATIENT
Start: 2018-05-01 | End: 2018-05-04 | Stop reason: HOSPADM

## 2018-05-01 RX ORDER — POLYETHYLENE GLYCOL 3350 17 G/17G
17 POWDER, FOR SOLUTION ORAL DAILY PRN
Status: DISCONTINUED | OUTPATIENT
Start: 2018-05-01 | End: 2018-05-04 | Stop reason: HOSPADM

## 2018-05-01 RX ORDER — SODIUM CHLORIDE 9 MG/ML
INJECTION, SOLUTION INTRAVENOUS CONTINUOUS
Status: DISCONTINUED | OUTPATIENT
Start: 2018-05-01 | End: 2018-05-02

## 2018-05-01 RX ORDER — NALOXONE HYDROCHLORIDE 0.4 MG/ML
.1-.4 INJECTION, SOLUTION INTRAMUSCULAR; INTRAVENOUS; SUBCUTANEOUS
Status: DISCONTINUED | OUTPATIENT
Start: 2018-05-01 | End: 2018-05-04 | Stop reason: HOSPADM

## 2018-05-01 RX ORDER — DEXTROSE MONOHYDRATE 25 G/50ML
25-50 INJECTION, SOLUTION INTRAVENOUS
Status: DISCONTINUED | OUTPATIENT
Start: 2018-05-01 | End: 2018-05-04 | Stop reason: HOSPADM

## 2018-05-01 RX ORDER — CEFAZOLIN SODIUM 2 G/100ML
2 INJECTION, SOLUTION INTRAVENOUS EVERY 8 HOURS
Status: CANCELLED | OUTPATIENT
Start: 2018-05-01

## 2018-05-01 RX ORDER — SODIUM CHLORIDE 9 MG/ML
1000 INJECTION, SOLUTION INTRAVENOUS CONTINUOUS
Status: DISCONTINUED | OUTPATIENT
Start: 2018-05-01 | End: 2018-05-01

## 2018-05-01 RX ORDER — DOCUSATE SODIUM 100 MG/1
100 CAPSULE, LIQUID FILLED ORAL 2 TIMES DAILY
Status: DISCONTINUED | OUTPATIENT
Start: 2018-05-01 | End: 2018-05-04 | Stop reason: HOSPADM

## 2018-05-01 RX ORDER — SODIUM CHLORIDE, SODIUM LACTATE, POTASSIUM CHLORIDE, CALCIUM CHLORIDE 600; 310; 30; 20 MG/100ML; MG/100ML; MG/100ML; MG/100ML
INJECTION, SOLUTION INTRAVENOUS CONTINUOUS
Status: DISCONTINUED | OUTPATIENT
Start: 2018-05-01 | End: 2018-05-01

## 2018-05-01 RX ORDER — ACETAMINOPHEN 325 MG/1
650 TABLET ORAL EVERY 4 HOURS PRN
Status: DISCONTINUED | OUTPATIENT
Start: 2018-05-01 | End: 2018-05-04 | Stop reason: HOSPADM

## 2018-05-01 RX ORDER — ONDANSETRON 4 MG/1
4 TABLET, ORALLY DISINTEGRATING ORAL EVERY 6 HOURS PRN
Status: DISCONTINUED | OUTPATIENT
Start: 2018-05-01 | End: 2018-05-04 | Stop reason: HOSPADM

## 2018-05-01 RX ORDER — NICOTINE POLACRILEX 4 MG
15-30 LOZENGE BUCCAL
Status: DISCONTINUED | OUTPATIENT
Start: 2018-05-01 | End: 2018-05-04 | Stop reason: HOSPADM

## 2018-05-01 RX ORDER — LIDOCAINE 40 MG/G
CREAM TOPICAL
Status: DISCONTINUED | OUTPATIENT
Start: 2018-05-01 | End: 2018-05-01

## 2018-05-01 RX ADMIN — SODIUM CHLORIDE: 9 INJECTION, SOLUTION INTRAVENOUS at 19:53

## 2018-05-01 RX ADMIN — SODIUM CHLORIDE, POTASSIUM CHLORIDE, SODIUM LACTATE AND CALCIUM CHLORIDE 3471 ML: 600; 310; 30; 20 INJECTION, SOLUTION INTRAVENOUS at 16:20

## 2018-05-01 RX ADMIN — CLINDAMYCIN PHOSPHATE 900 MG: 18 INJECTION, SOLUTION INTRAVENOUS at 18:57

## 2018-05-01 RX ADMIN — ENOXAPARIN SODIUM 40 MG: 40 INJECTION SUBCUTANEOUS at 20:11

## 2018-05-01 RX ADMIN — SODIUM CHLORIDE 1000 ML: 9 INJECTION, SOLUTION INTRAVENOUS at 13:39

## 2018-05-01 ASSESSMENT — ENCOUNTER SYMPTOMS
ABDOMINAL PAIN: 0
FATIGUE: 1
SHORTNESS OF BREATH: 0
FEVER: 0
PALPITATIONS: 0
LIGHT-HEADEDNESS: 1
COUGH: 0
HEADACHES: 1

## 2018-05-01 ASSESSMENT — ACTIVITIES OF DAILY LIVING (ADL): ADLS_ACUITY_SCORE: 11

## 2018-05-01 NOTE — TELEPHONE ENCOUNTER
Patient presented to clinic today with reports of dizziness and fatigue starting at 0900 today while at work. Patient was dropped off at clinic today by a co-worker. Upon assessment patient had a /72, P 127, SpO2 96% RA. Patient was diaphoretic.  Irene Pittman CNP, assessed patient as well and advised patient to go to ED. This writer and Irene Pittman transported pt to ED, where patient checked in and care was taken over by [facility] ED triage nurse.

## 2018-05-01 NOTE — ED NOTES
"[facility]  ED Nurse Handoff Report    Lance Ibrahim is a 73 year old male   ED Chief complaint: Headache and Dizziness  . ED Diagnosis:   Final diagnoses:   Generalized muscle weakness   Bacterial sepsis (H)     Allergies:   Allergies   Allergen Reactions     Penicillins      \"anaphylactic\"     Sulfa Drugs      \"itchy rash, swelling of face and hands\"       Code Status: Full Code  Activity level - Baseline/Home:  Independent. Activity Level - Current:   Stand with Assist. Lift room needed: No. Bariatric: No   Needed: No   Isolation: No. Infection: Not Applicable.     Vital Signs:   Vitals:    05/01/18 1645 05/01/18 1700 05/01/18 1715 05/01/18 1730   BP: 157/84 155/82 141/76 148/81   Pulse:       Resp:  27 21    Temp:       TempSrc:       SpO2:  95%     Weight:       Height:           Cardiac Rhythm:  ,      Pain level: 0-10 Pain Scale: 0  Patient confused: No. Patient Falls Risk: Yes.   Elimination Status: Has voided   Patient Report - Initial Complaint: Dizzy, fatigue. Focused Assessment: A&O. UP with stand by assist. HR 120s on arrival, now low 100s. EKG - ST. Slight headache.   Tests Performed: labs, Head CT . Abnormal Results:   CT Head w/o Contrast   Final Result   IMPRESSION:  Diffuse cerebral volume loss and cerebral white matter   changes consistent with chronic small vessel ischemic disease. No   evidence for acute intracranial pathology.       Radiation dose for this scan was reduced using automated exposure   control, adjustment of the mA and/or kV according to patient size, or   iterative reconstruction technique.      SUJIT FORREST MD      XR Chest 2 Views   Final Result   IMPRESSION: No radiographic evidence of acute chest abnormality.       CATRINA VANG MD        Labs Ordered and Resulted from Time of ED Arrival Up to the Time of Departure from the ED   CBC WITH PLATELETS DIFFERENTIAL - Abnormal; Notable for the following:        Result Value    WBC 21.1 (*)     " Hemoglobin 13.1 (*)     RDW 15.8 (*)     Absolute Neutrophil 18.3 (*)     Absolute Monocytes 1.4 (*)     All other components within normal limits   COMPREHENSIVE METABOLIC PANEL - Abnormal; Notable for the following:     Glucose 106 (*)     Bilirubin Total 0.1 (*)     Albumin 3.2 (*)     All other components within normal limits   ROUTINE UA WITH MICROSCOPIC - Abnormal; Notable for the following:     Mucous Urine Present (*)     All other components within normal limits   ISTAT  GASES LACTATE JOVANI POCT - Abnormal; Notable for the following:     PO2 Venous 22 (*)     Lactic Acid 3.4 (*)     All other components within normal limits   ISTAT  GASES LACTATE JOVANI POCT - Abnormal; Notable for the following:     PCO2 Venous 38 (*)     PO2 Venous 20 (*)     Lactic Acid 2.6 (*)     All other components within normal limits   MAGNESIUM   TSH   PULSE OXIMETRY NURSING   ORTHOSTATIC BLOOD PRESSURE AND PULSE   PERIPHERAL IV CATHETER   ISTAT TROPONIN NURSING POCT   ISTAT CG4 GASES LACTATE JOVANI NURSING POCT   PULSE OXIMETRY NURSING   CARDIAC CONTINUOUS MONITORING   MEASURE URINE OUTPUT   PATIENT CARE ORDER   ISTAT CG4 GASES LACTATE JOVANI NURSING POCT   TROPONIN POCT   URINE CULTURE AEROBIC BACTERIAL   BLOOD CULTURE   INFLUENZA A/B ANTIGEN   RAPID STREP SCREEN   BLOOD CULTURE   BETA STREP GROUP A CULTURE       .   Treatments provided: IVF  Family Comments: No family at bedside.   OBS brochure/video discussed/provided to patient:  Yes  ED Medications:   Medications   lidocaine 1 % 1 mL (not administered)   lidocaine (LMX4) kit (not administered)   sodium chloride (PF) 0.9% PF flush 3 mL (not administered)   sodium chloride (PF) 0.9% PF flush 3 mL (not administered)   0.9% sodium chloride BOLUS (0 mLs Intravenous Stopped 5/1/18 1550)     Followed by   sodium chloride 0.9% infusion (not administered)   lactated ringers infusion (not administered)   lactated ringers BOLUS 3,471 mL (3,471 mLs Intravenous New Bag 5/1/18 1620)     Drips  infusing:  No  For the majority of the shift, the patient's behavior Green. Interventions performed were NA.     Severe Sepsis OR Septic Shock Diagnosis Present: No      ED Nurse Name/Phone Number: Gemini Bey,   5:46 PM    RECEIVING UNIT ED HANDOFF REVIEW    Above ED Nurse Handoff Report was reviewed: Yes  Reviewed by: Jena Reeves on May 1, 2018 at 6:09 PM     RECEIVING UNIT ED HANDOFF REVIEW    Above ED Nurse Handoff Report was reviewed: Yes  Reviewed by: Marian Bertrand on May 1, 2018 at 6:33 PM

## 2018-05-01 NOTE — ED TRIAGE NOTES
Pt reports he was at work and was feeling dizzy, had to lie his head down on his desk for 30 minutes. Pt was sent home from work so he went to his clinic. Pt was found to have a rapid heart rate so he was sent to ED. Pt also reports throbbing headache, 3.5/10.

## 2018-05-01 NOTE — IP AVS SNAPSHOT
[facility] [facility] [id number] [facility]    [address]    [facility] [address]    Phone:  [phone]    Fax:  [phone]                                       After Visit Summary   5/1/2018    Lance Ibrahim    MRN: [id number]           After Visit Summary Signature Page     I have received my discharge instructions, and my questions have been answered. I have discussed any challenges I see with this plan with the nurse or doctor.    ..........................................................................................................................................  Patient/Patient Representative Signature      ..........................................................................................................................................  Patient Representative Print Name and Relationship to Patient    ..................................................               ................................................  Date                                            Time    ..........................................................................................................................................  Reviewed by Signature/Title    ...................................................              ..............................................  Date                                                            Time

## 2018-05-01 NOTE — PHARMACY-ADMISSION MEDICATION HISTORY
Admission medication history interview status for this patient is complete. See [facility] admission navigator for allergy information, prior to admission medications and immunization status.     Medication history interview source(s):Patient  Medication history resources (including written lists, pill bottles, clinic record):None    Changes made to PTA medication list:  Added: none  Deleted: none  Changed: none    Actions taken by pharmacist (provider contacted, etc):None     Additional medication history information:None    Medication reconciliation/reorder completed by provider prior to medication history? No    For patients on insulin therapy: no (Yes/No)   Lantus/levemir/NPH/Mix 70/30 dose: ___ in AM/PM or twice daily   Sliding scale Novolog Y/N   If Yes, do you have a baseline novolog pre-meal dose: ______units with meals   Patients eat three meals a day: Y/N ---  How many episodes of hypoglycemia (low blood glucose) do you have weekly: ---   How many missed doses do you have a week: ---  How many times do you check your blood glucose per day: ---  Any Barriers to therapy: cost of medications/comfortable with giving injections (if applicable)/ comfortable and confident with current diabetes regimen ---      Prior to Admission medications    Medication Sig Last Dose Taking? Auth Provider   aspirin 325 MG tablet Take 325 mg by mouth daily  5/1/2018 at am Yes Reported, Patient   ferrous gluconate (FERGON) 324 (38 FE) MG tablet Take 1 tablet by mouth daily. 5/1/2018 at am Yes Reported, Patient   finasteride (PROSCAR) 5 MG tablet Take 5 mg by mouth daily. 5/1/2018 at am Yes Reported, Patient   fluticasone (FLONASE) 50 MCG/ACT spray SPRAY 1-2 SPRAYS INTO BOTH NOSTRILS DAILY 5/1/2018 at am Yes Anh Spivey NP   lisinopril (PRINIVIL/ZESTRIL) 10 MG tablet TAKE 2 TABLETS (20 MG) BY MOUTH DAILY 5/1/2018 at am Yes Anh Spivey NP   loratadine (CLARITIN) 10 MG tablet Take 10 mg by mouth At Bedtime 4/30/2018 at  Unknown time Yes Reported, Patient   metFORMIN (GLUCOPHAGE) 1000 MG tablet TAKE 1 TABLET (1,000 MG) BY MOUTH 2 TIMES DAILY (WITH MEALS) 5/1/2018 at am Yes Anh Spivey NP   Multiple Vitamins-Minerals (CENTRUM SILVER) per tablet Take 1 tablet by mouth daily. 5/1/2018 at am Yes Reported, Patient   naproxen sodium (ALEVE) 220 MG capsule Take 220 mg by mouth as needed. prn Yes Reported, Patient   propylene glycol (SYSTANE BALANCE) 0.6 % SOLN Place 1 drop into both eyes every morning  5/1/2018 at am Yes Reported, Patient   tamsulosin (FLOMAX) 0.4 MG 24 hr capsule Take 1 capsule by mouth daily. 5/1/2018 at am Yes Reported, Patient

## 2018-05-01 NOTE — IP AVS SNAPSHOT
MRN:[id number]                      After Visit Summary   5/1/2018    Lance Ibrahim    MRN: [id number]           Thank you!     Thank you for choosing [facility] for your care. Our goal is always to provide you with excellent care. Hearing back from our patients is one way we can continue to improve our services. Please take a few minutes to complete the written survey that you may receive in the mail after you visit. If you would like to speak to someone directly about your visit please contact Patient Relations at [phone]. Thank you!          Patient Information     Date Of Birth          1945        Designated Caregiver       Most Recent Value    Caregiver    Will someone help with your care after discharge? no    Name of designated caregiver wife-Monique    Phone number of caregiver [phone]      About your hospital stay     You were admitted on:  May 1, 2018 You last received care in the:  [place] [address] [facility]    You were discharged on:  May 4, 2018        Reason for your hospital stay       You were hospitalized for left leg cellulitis.  This improved with IV antibiotics.  Please complete the full course of oral clindamycin on discharge.  Elevate your left leg as much as possible to help with swelling.  If developing significant symptoms of fevers or diarrhea please be evaluated by your doctor.                  Who to Call     For medical emergencies, please call 911.  For non-urgent questions about your medical care, please call your primary care provider or clinic, [phone]          Attending Provider     Provider Specialty    Skip Gaspar MD Emergency Medicine    Quintin, Edwin Vu MD Emergency Medicine    Tuan, Jagjit Plascencia MD Internal Medicine       Primary Care Provider Office Phone # Fax #    Anh Spivey [phone]      After Care Instructions     Activity       Your activity upon discharge: activity as  "tolerated            Diet       Follow this diet upon discharge: Orders Placed This Encounter      Moderate Consistent CHO Diet                  Follow-up Appointments     Follow-up and recommended labs and tests        Follow up with primary care provider, Anh Spivey, within 7 days for hospital follow- up.                  Pending Results     Date and Time Order Name Status Description    2018 1532 Blood culture Preliminary     2018 1528 Blood culture Preliminary             Statement of Approval     Ordered          18 0928  I have reviewed and agree with all the recommendations and orders detailed in this document.  EFFECTIVE NOW     Approved and electronically signed by:  Vidal Newman MD             Admission Information     Date & Time Provider Department Dept. Phone    2018 Jagjit Dickerson MD [place] [address] [facility] [phone]      Your Vitals Were     Blood Pressure Pulse Temperature Respirations Height Weight    132/63 91 96.4  F (35.8  C) (Oral) 18 1.829 m (6') 119.2 kg (262 lb 12.8 oz)    Pulse Oximetry BMI (Body Mass Index)                97% 35.64 kg/m2          [employer] Information     [employer] lets you send messages to your doctor, view your test results, renew your prescriptions, schedule appointments and more. To sign up, go to [email or web address] . Click on \"Log in\" on the left side of the screen, which will take you to the Welcome page. Then click on \"Sign up Now\" on the right side of the page.     You will be asked to enter the access code listed below, as well as some personal information. Please follow the directions to create your username and password.     Your access code is: [email or web address]  Expires: 2018 10:04 AM     Your access code will  in 90 days. If you need help or a new code, please call your [facility] or [phone].        Care EveryWhere ID     This is your Care EveryWhere ID. This could be used by other " organizations to access your [place] medical records  [id number]        Equal Access to Services     OTTONIEL BURGESS : Hadii jacqui Corral, cassia fernando, ashley graves. So [facility] [phone].    ATENCIÓN: Si habla español, tiene a [facility] disposición servicios gratuitos de asistencia lingüística. Llame al [phone].    We comply with applicable federal civil rights laws and [place] laws. We do not discriminate on the basis of race, color, national origin, age, disability, sex, sexual orientation, or gender identity.               Review of your medicines      START taking        Dose / Directions    clindamycin 300 MG capsule   Commonly known as:  CLEOCIN   Used for:  Left leg cellulitis   Notes to Patient:  6 hours in between doses.        Dose:  300 mg   Take 1 capsule (300 mg) by mouth 4 times daily for 7 days   Quantity:  28 capsule   Refills:  0         CONTINUE these medicines which have NOT CHANGED        Dose / Directions    ALEVE 220 MG capsule   Generic drug:  naproxen sodium        Dose:  220 mg   Take 220 mg by mouth as needed.   Refills:  0       aspirin 325 MG tablet        Dose:  325 mg   Take 325 mg by mouth daily   Refills:  0       CENTRUM SILVER per tablet        Dose:  1 tablet   Take 1 tablet by mouth daily.   Refills:  0       ferrous gluconate 324 (38 Fe) MG tablet   Commonly known as:  FERGON        Dose:  1 tablet   Take 1 tablet by mouth daily.   Refills:  0       finasteride 5 MG tablet   Commonly known as:  PROSCAR        Dose:  5 mg   Take 5 mg by mouth daily.   Refills:  0       fluticasone 50 MCG/ACT spray   Commonly known as:  FLONASE   Used for:  Chronic rhinitis, unspecified type        SPRAY 1-2 SPRAYS INTO BOTH NOSTRILS DAILY   Quantity:  48 g   Refills:  1       lisinopril 10 MG tablet   Commonly known as:  PRINIVIL/ZESTRIL   Used for:  Essential hypertension        TAKE 2 TABLETS (20 MG) BY MOUTH DAILY    Quantity:  180 tablet   Refills:  3       loratadine 10 MG tablet   Commonly known as:  CLARITIN        Dose:  10 mg   Take 10 mg by mouth At Bedtime   Refills:  0       metFORMIN 1000 MG tablet   Commonly known as:  GLUCOPHAGE   Used for:  Type 2 diabetes mellitus without complication, without long-term current use of insulin (H)        TAKE 1 TABLET (1,000 MG) BY MOUTH 2 TIMES DAILY (WITH MEALS)   Quantity:  180 tablet   Refills:  1       SYSTANE BALANCE 0.6 % Soln ophthalmic solution   Indication:  Thera tears   Generic drug:  propylene glycol        Dose:  1 drop   Place 1 drop into both eyes every morning   Refills:  0       tamsulosin 0.4 MG capsule   Commonly known as:  FLOMAX        Dose:  1 capsule   Take 1 capsule by mouth daily.   Refills:  0            Where to get your medicines      These medications were sent to [facility] [address] [address] [address]     Phone:  [phone]     clindamycin 300 MG capsule                Protect others around you: Learn how to safely use, store and throw away your medicines at www.disposemymeds.org.        ANTIBIOTIC INSTRUCTION     You've Been Prescribed an Antibiotic - Now What?  Your healthcare team thinks that you or your loved one might have an infection. Some infections can be treated with antibiotics, which are powerful, life-saving drugs. Like all medications, antibiotics have side effects and should only be used when necessary. There are some important things you should know about your antibiotic treatment.      Your healthcare team may run tests before you start taking an antibiotic.    Your team may take samples (e.g., from your blood, urine or other areas) to run tests to look for bacteria. These test can be important to determine if you need an antibiotic at all and, if you do, which antibiotic will work best.      Within a few days, your healthcare team might change or even  stop your antibiotic.    Your team may start you on an antibiotic while they are working to find out what is making you sick.    Your team might change your antibiotic because test results show that a different antibiotic would be better to treat your infection.    In some cases, once your team has more information, they learn that you do not need an antibiotic at all. They may find out that you don't have an infection, or that the antibiotic you're taking won't work against your infection. For example, an infection caused by a virus can't be treated with antibiotics. Staying on an antibiotic when you don't need it is more likely to be harmful than helpful.      You may experience side effects from your antibiotic.    Like all medications, antibiotics have side effects. Some of these can be serious.    Let you healthcare team know if you have any known allergies when you are admitted to the hospital.    One significant side effect of nearly all antibiotics is the risk of severe and sometimes deadly diarrhea caused by Clostridium difficile (C. Difficile). This occurs when a person takes antibiotics because some good germs are destroyed. Antibiotic use allows C. diificile to take over, putting patients at high risk for this serious infection.    As a patient or caregiver, it is important to understand your or your loved one's antibiotic treatment. It is especially important for caregivers to speak up when patients can't speak for themselves. Here are some important questions to ask your healthcare team.    What infection is this antibiotic treating and how do you know I have that infection?    What side effects might occur from this antibiotic?    How long will I need to take this antibiotic?    Is it safe to take this antibiotic with other medications or supplements (e.g., vitamins) that I am taking?     Are there any special directions I need to know about taking this antibiotic? For example, should I take it with  food?    How will I be monitored to know whether my infection is responding to the antibiotic?    What tests may help to make sure the right antibiotic is prescribed for me?      Information provided by:  www.cdc.gov/getsmart  U.S. Department of Health and Human Services  Centers for disease Control and Prevention  National Center for Emerging and Zoonotic Infectious Diseases  Division of Healthcare Quality Promotion             Medication List: This is a list of all your medications and when to take them. Check marks below indicate your daily home schedule. Keep this list as a reference.      Medications           Morning Afternoon Evening Bedtime As Needed    ALEVE 220 MG capsule   Take 220 mg by mouth as needed.   Generic drug:  naproxen sodium   Next Dose Due:  Resume as normal  5/5/18                                   aspirin 325 MG tablet   Take 325 mg by mouth daily   Last time this was given:  325 mg on 5/4/2018  9:46 AM   Next Dose Due:  5/5/18                                   CENTRUM SILVER per tablet   Take 1 tablet by mouth daily.   Next Dose Due:  Resume as normal                                   clindamycin 300 MG capsule   Commonly known as:  CLEOCIN   Take 1 capsule (300 mg) by mouth 4 times daily for 7 days   Last time this was given:  5/4/18 @ 2:30am   Next Dose Due:  5/4/18 around noon.   Notes to Patient:  6 hours in between doses.                                            ferrous gluconate 324 (38 Fe) MG tablet   Commonly known as:  FERGON   Take 1 tablet by mouth daily.   Next Dose Due:  Resume as normal                                   finasteride 5 MG tablet   Commonly known as:  PROSCAR   Take 5 mg by mouth daily.   Last time this was given:  5 mg on 5/4/2018  9:45 AM   Next Dose Due:  5/5/18                                   fluticasone 50 MCG/ACT spray   Commonly known as:  FLONASE   SPRAY 1-2 SPRAYS INTO BOTH NOSTRILS DAILY   Next Dose Due:  Resume as normal                                    lisinopril 10 MG tablet   Commonly known as:  PRINIVIL/ZESTRIL   TAKE 2 TABLETS (20 MG) BY MOUTH DAILY   Last time this was given:  20 mg on 5/4/2018  9:45 AM   Next Dose Due:  5/5/18                                   loratadine 10 MG tablet   Commonly known as:  CLARITIN   Take 10 mg by mouth At Bedtime   Last time this was given:  10 mg on 5/3/2018  7:20 PM   Next Dose Due:  5/4/18                                   metFORMIN 1000 MG tablet   Commonly known as:  GLUCOPHAGE   TAKE 1 TABLET (1,000 MG) BY MOUTH 2 TIMES DAILY (WITH MEALS)   Last time this was given:  1,000 mg on 5/4/2018  9:46 AM   Next Dose Due:  5/4/18 @ 6pm                                   SYSTANE BALANCE 0.6 % Soln ophthalmic solution   Place 1 drop into both eyes every morning   Generic drug:  propylene glycol   Next Dose Due:  5/5/18                                   tamsulosin 0.4 MG capsule   Commonly known as:  FLOMAX   Take 1 capsule by mouth daily.   Last time this was given:  0.4 mg on 5/4/2018  9:46 AM   Next Dose Due:  5/5/18                                             More Information        Cellulitis  Cellulitis is an infection of the deep layers of skin. A break in the skin, such as a cut or scratch, can let bacteria under the skin. If the bacteria get to deep layers of the skin, it can be serious. If not treated, cellulitis can get into the bloodstream and lymph nodes. The infection can then spread throughout the body. This causes serious illness.  Cellulitis causes the affected skin to become red, swollen, warm, and sore. The reddened areas have a visible border. An open sore may leak fluid (pus). You may have a fever, chills, and pain.  Cellulitis is treated with antibiotics taken for 7 to 10 days. An open sore may be cleaned and covered with cool wet gauze. Symptoms should get better 1 to 2 days after treatment is started. Make sure to take all the antibiotics for the full number of days until they are gone. Keep taking  the medicine even if your symptoms go away.  Home care  Follow these tips:    Limit the use of the part of your body with cellulitis.     If the infection is on your leg, keep your leg raised while sitting. This will help to reduce swelling.    Take all of the antibiotic medicine exactly as directed until it is gone. Do not miss any doses, especially during the first 7 days. Don t stop taking the medicine when your symptoms get better.    Keep the affected area clean and dry.    Wash your hands with soap and warm water before and after touching your skin. Anyone else who touches your skin should also wash his or her hands. Don't share towels.  Follow-up care  Follow up with your healthcare provider, or as advised. If your infection does not go away on the first antibiotic, your healthcare provider will prescribe a different one.  When to seek medical advice  Call your healthcare provider right away if any of these occur:    Red areas that spread    Swelling or pain that gets worse    Fluid leaking from the skin (pus)    Fever higher of 100.4  F (38.0  C) or higher after 2 days on antibiotics  Date Last Reviewed: 9/1/2016 2000-2017 [employer]. [address] [place] [address]. All rights reserved. This information is not intended as a substitute for professional medical care. Always follow your healthcare professional's instructions.                Discharge Instructions for Cellulitis  You have been diagnosed with cellulitis. This is an infection in the deepest layer of the skin. In some cases, the infection also affects the muscle. Cellulitis is caused by bacteria. The bacteria can enter the body through broken skin. This can happen with a cut, scratch, animal bite, or an insect bite that has been scratched. You may have been treated in the hospital with antibiotics and fluids. You will likely be given a prescription for antibiotics to take at home. This sheet will help you take care of  yourself at home.  Home care  When you are home:    Take the prescribed antibiotic medicine you are given as directed until it is gone. Take it even if you feel better. It treats the infection and stops it from returning. Not taking all the medicine can make future infections hard to treat.    Keep the infected area clean.    When possible, raise the infected area above the level of your heart. This helps keep swelling down.    Talk with your healthcare provider if you are in pain. Ask what kind of over-the-counter medicine you can take for pain.    Apply clean bandages as advised.    Take your temperature once a day for a week.    Wash your hands often to prevent spreading the infection.  In the future, wash your hands before and after you touch cuts, scratches, or bandages. This will help prevent infection.   When to call your healthcare provider  Call your healthcare provider immediately if you have any of the following:    Difficulty or pain when moving the joints above or below the infected area    Discharge or pus draining from the area    Fever of 100.4 F (38 C) or higher, or as directed by your healthcare provider    Pain that gets worse in or around the infected     Redness that gets worse in or around the infected area, particularly if the area of redness expands to a wider area    Shaking chills    Swelling of the infected area    Vomiting   Date Last Reviewed: 8/1/2016 2000-2017 [employer]. [address] [place] [address], [place] [address]. All rights reserved. This information is not intended as a substitute for professional medical care. Always follow your healthcare professional's instructions.

## 2018-05-01 NOTE — ED PROVIDER NOTES
History     Chief Complaint:  Fatigue, elevated heart rate    HPI   Lance Ibrahim is a 73 year old male with a history of Type 2 diabetes, hypertension, and TIA who presents with elevated heart rate detected at clinic today. The patient states that he went to bed feeling otherwise fine and was at work around 0930 he was at work where he began feeling abnormally tired. He was found by co-worker completely asleep at his desk but he woke up immediately and was feeling lightheaded. It is unusual for him to be that tired in the morning and so he went to his clinic next door to be evaluated. There, he was found to have an elevated heart rate in 120s prompting the staff to escort the patient to the ED. The patient notes he has not had increased caffeine intake but does drink coffee every morning. Likewise he mentions that he takes Afrin at night and in the morning every day, and has been doing this for a prolonged period of time. He states that he did have a throbbing headache in the bilateral temporal region but otherwise denies fevers, chills, cough, shortness of breath, chest pain, nausea, vomiting, or leg swelling. His blood sugar ranges from 120 to 150 normally; Last A1c was 5.9    Allergies:  Penicillins  Sulfa Drugs      Medications:    Aspirin 325 mg  Fergon  Finasteride  Lisinopril  Metformin  Claritin  Flomax   Afrin    Past Medical History:    Type 2 diabetes  Hypertension  TIA  Cervicalgia  Obesity  Hypertrophy of prostate  Ventral hernia  Gout  Chronic rhinitis    Past Surgical History:    Diastasis recti repair  Ventral hernia repair  ORIF left shoulder  Left eye surgery  Cyst removal    Family History:    History reviewed. No pertinent family history.      Social History:  Smoking Status: Former Smoker  Alcohol Use: Yes, occasionally  Patient presents alone.   Marital Status:        Review of Systems   Constitutional: Positive for fatigue. Negative for fever.   Respiratory: Negative for cough and  shortness of breath.    Cardiovascular: Negative for chest pain, palpitations and leg swelling.   Gastrointestinal: Negative for abdominal pain.   Neurological: Positive for light-headedness and headaches.   All other systems reviewed and are negative.    Physical Exam     Patient Vitals for the past 24 hrs:   BP Temp Temp src Pulse Heart Rate Resp SpO2 Height Weight   05/01/18 1815 160/84 - - - 101 19 97 % - -   05/01/18 1800 (!) 154/93 - - - 103 22 98 % - -   05/01/18 1730 148/81 - - - - - - - -   05/01/18 1715 141/76 - - - 106 21 - - -   05/01/18 1700 155/82 - - - 104 27 95 % - -   05/01/18 1645 157/84 - - - - - - - -   05/01/18 1630 148/77 - - - - - 91 % - -   05/01/18 1615 140/72 - - - - - 93 % - -   05/01/18 1545 157/74 - - - - - - - -   05/01/18 1515 136/73 - - - 111 26 91 % - -   05/01/18 1502 - 99.3  F (37.4  C) Oral - - - - - -   05/01/18 1445 142/79 - - - - - - - -   05/01/18 1430 125/79 - - - 113 19 95 % - -   05/01/18 1415 123/82 - - - 114 13 (!) 83 % - -   05/01/18 1400 123/62 - - - - - - - -   05/01/18 1345 132/75 - - - 112 22 (!) 89 % - -   05/01/18 1333 - 99.7  F (37.6  C) Oral - - - - 1.829 m (6') 115.7 kg (255 lb)   05/01/18 1330 - - - - 120 14 96 % - -   05/01/18 1318 145/79 - Oral 118 - 20 95 % - -      Physical Exam  General: Lying on bed.  HEENT:   The scalp and head appear normal    The pupils are equal, round, and reactive to light    Extraocular muscles are intact.    Nasally congested.    Dry oral mucosa.    Uvula is in the midline.    Neck:  Normal range of motion.    Lungs:  Clear.      No rales, no wheezing.      There is no tachypnea.  Non-labored.  Cardiac: Regular rate.      Normal S1 and S2.      No pathological murmur/rub    Abdomen: Soft. No distension, no localized tenderness or rebound.  MS:  Normal tone. Normal movement of all extremities.   Neurologic:     Normal mentation.  No cranial nerve deficits.  No focal motor or sensory                            changes.      Speech  normal.  Psych:  Awake.     Alert.      Normal affect.      Appropriate interactions.  Skin:  No rash.      No lesions.    Venous stasis changed to tib/fib region with mild early cellulitic changes. Warm to touch    Warm skin.        Emergency Department Course     ECG (13:19:56):  Rate 120 bpm. IA interval 166. QRS duration 82. QT/QTc 300/424. P-R-T axes 55 -5 92. Sinus tachycardia. Left ventricular hypertrophy with repolarization abnormality. Abnormal ECG> Interpreted at 1337 by Skip Gaspar MD.     Imaging:  Radiographic findings were communicated with the patient who voiced understanding of the findings.    X-ray Chest, 2 views:  No radiographic evidence of acute chest abnormality.   Result per radiology.     CT-scan Head w/o contrast:  Diffuse cerebral volume loss and cerebral white matter  changes consistent with chronic small vessel ischemic disease. No  evidence for acute intracranial pathology.   Result per radiology.      Laboratory:  1342 -Troponin POCT: 0.00  CBC: WBC 21.1 (H), HGB 13.1 (L), o/w WNL ()    CMP: Glucose 106 (H), Bilirubin total 0.1 (L), Albumin 3.2 (L), o/w WNL (Creatinine 0.93)   Magnesium: 1.8  TSH: 2.19  1548 - ISTAT Lactate: pH 7.37, pCO2 40, pO2 22 (L), Bicarbonate 24, Lactic Acid 3.4 (H)  1620 - ISTAT Lactate: pH 7.43, pCO2 38 (L), pO2 20 (L), Bicarbonate 25, Lactic Acid 2.6 (H)      UA: Mucous present, o/w negative  Influenza: Negative  Rapid Strep: Negative    Urine Culture: Pending  Strep Culture: Pending   Blood Culture x2: Pending     Interventions:  1339 - NS 1L IV Bolus   1620 - LR 3,471 mL IV  Clindamycin 900 mg IV       Emergency Department Course:  Past medical records, nursing notes, and vitals reviewed.  1328: I performed an exam of the patient and obtained history, as documented above.   IV inserted and blood drawn.   The patient was sent for a X-ray and while in the emergency department, findings above.     I rechecked the patient. Explained findings to  patient.     The patient was sent for a CT-scan while in the emergency department, findings above.     Findings and plan explained to the Patient who consents to admission.     1752: Discussed the patient with Dr. Dickerson, who will admit the patient to a medical bed for further monitoring, evaluation, and treatment.      Impression & Plan      CMS Diagnosis:  The patient has signs of Severe Sepsisas evidenced by:    1. 2 SIRS criteria, AND  2. Suspected infection, AND   3. Organ dysfunction: Lactic Acid > 1.9    Time severe sepsis diagnosis confirmed = 1548 as this was the time when Lactate resulted, and the level was > 1.9      3 Hour Severe Sepsis Bundle Completion:  1. Initial Lactic Acid Result:   Recent Labs   Lab Test  05/01/18   1720  05/01/18   1548  10/22/16   2052   LACT  2.6*  3.4*  2.6*     2. Blood Cultures before Antibiotics: Yes  3. Broad Spectrum Antibiotics Administered: Yes     Anti-infectives     Ancef 2 g IV        4. 30 CC/kg ml of IV fluids.  Ideal body weight: 77.6 kg (171 lb 1.2 oz)  Adjusted ideal body weight: 92.8 kg (204 lb 10.3 oz)    Severe Sepsis reassessment:  1. Repeat Lactic Acid Level: 2.6 (H)  2. MAP>65 after initial IVF bolus, will continue to monitor fluid status and vital signs  I attest to having performed a repeat sepsis exam and assessment of perfusion at 1630 and the results demonstrate improved perfusion.    Medical Decision Making:  Lance Ibrahim is a 73 year old male who comes in with weakness and feeling extremely fatigued. He also felt lightheaded today. He was noted to be febrile here with marked leukocytosis and elevated lactate. Diligent search undertaken for his focus infection and none was initially found. When Dr. Dickerson came to see the patient his pants legs were pulled up and there was noted to be cellulitis early in its course the left lower extremity with some venostasis changes in left lateral lower leg. The patient was not aware of this condition.     I am  ordering Ancef 2g IV for him. He has been hemodynamically stable. Lactate improved after IV fluids. Will be admitted to medical floor with Dr. Cantor accepting.     Diagnosis:    ICD-10-CM    1. Generalized muscle weakness M62.81    2. Dizziness R42    3.      Sepsis and cellulitis    Dylan Espinoza  5/1/2018   [facility] EMERGENCY DEPARTMENT  I, Dylan Espinoza, am serving as a scribe at 1:28 PM on 5/1/2018 to document services personally performed by Skip Gaspar MD based on my observations and the provider's statements to me.       Skip Gaspar MD  05/04/18 [id number]

## 2018-05-02 LAB
ANION GAP SERPL CALCULATED.3IONS-SCNC: 7 MMOL/L (ref 3–14)
BACTERIA SPEC CULT: NO GROWTH
BUN SERPL-MCNC: 14 MG/DL (ref 7–30)
CALCIUM SERPL-MCNC: 8.6 MG/DL (ref 8.5–10.1)
CHLORIDE SERPL-SCNC: 104 MMOL/L (ref 94–109)
CO2 SERPL-SCNC: 26 MMOL/L (ref 20–32)
CREAT SERPL-MCNC: 0.96 MG/DL (ref 0.66–1.25)
ERYTHROCYTE [DISTWIDTH] IN BLOOD BY AUTOMATED COUNT: 15.9 % (ref 10–15)
GFR SERPL CREATININE-BSD FRML MDRD: 77 ML/MIN/1.7M2
GLUCOSE BLDC GLUCOMTR-MCNC: 112 MG/DL (ref 70–99)
GLUCOSE BLDC GLUCOMTR-MCNC: 114 MG/DL (ref 70–99)
GLUCOSE BLDC GLUCOMTR-MCNC: 120 MG/DL (ref 70–99)
GLUCOSE BLDC GLUCOMTR-MCNC: 181 MG/DL (ref 70–99)
GLUCOSE SERPL-MCNC: 124 MG/DL (ref 70–99)
HCT VFR BLD AUTO: 36.7 % (ref 40–53)
HGB BLD-MCNC: 11.7 G/DL (ref 13.3–17.7)
Lab: NORMAL
MCH RBC QN AUTO: 29.9 PG (ref 26.5–33)
MCHC RBC AUTO-ENTMCNC: 31.9 G/DL (ref 31.5–36.5)
MCV RBC AUTO: 94 FL (ref 78–100)
PLATELET # BLD AUTO: 188 10E9/L (ref 150–450)
POTASSIUM SERPL-SCNC: 4.4 MMOL/L (ref 3.4–5.3)
RBC # BLD AUTO: 3.91 10E12/L (ref 4.4–5.9)
SODIUM SERPL-SCNC: 137 MMOL/L (ref 133–144)
SPECIMEN SOURCE: NORMAL
WBC # BLD AUTO: 13 10E9/L (ref 4–11)

## 2018-05-02 PROCEDURE — 99207 ZZC CDG-MDM COMPONENT: MEETS LOW - DOWN CODED: CPT | Performed by: HOSPITALIST

## 2018-05-02 PROCEDURE — 00000146 ZZHCL STATISTIC GLUCOSE BY METER IP

## 2018-05-02 PROCEDURE — 36415 COLL VENOUS BLD VENIPUNCTURE: CPT | Performed by: INTERNAL MEDICINE

## 2018-05-02 PROCEDURE — 12000000 ZZH R&B MED SURG/OB

## 2018-05-02 PROCEDURE — 25000132 ZZH RX MED GY IP 250 OP 250 PS 637: Performed by: INTERNAL MEDICINE

## 2018-05-02 PROCEDURE — 25000125 ZZHC RX 250: Performed by: INTERNAL MEDICINE

## 2018-05-02 PROCEDURE — 85027 COMPLETE CBC AUTOMATED: CPT | Performed by: INTERNAL MEDICINE

## 2018-05-02 PROCEDURE — 80048 BASIC METABOLIC PNL TOTAL CA: CPT | Performed by: INTERNAL MEDICINE

## 2018-05-02 PROCEDURE — 99232 SBSQ HOSP IP/OBS MODERATE 35: CPT | Performed by: HOSPITALIST

## 2018-05-02 PROCEDURE — 25000128 H RX IP 250 OP 636: Performed by: INTERNAL MEDICINE

## 2018-05-02 RX ORDER — LISINOPRIL 20 MG/1
20 TABLET ORAL DAILY
Status: DISCONTINUED | OUTPATIENT
Start: 2018-05-02 | End: 2018-05-04 | Stop reason: HOSPADM

## 2018-05-02 RX ORDER — FINASTERIDE 5 MG/1
5 TABLET, FILM COATED ORAL DAILY
Status: DISCONTINUED | OUTPATIENT
Start: 2018-05-02 | End: 2018-05-04 | Stop reason: HOSPADM

## 2018-05-02 RX ORDER — ASPIRIN 325 MG
325 TABLET ORAL DAILY
Status: DISCONTINUED | OUTPATIENT
Start: 2018-05-02 | End: 2018-05-04 | Stop reason: HOSPADM

## 2018-05-02 RX ORDER — TAMSULOSIN HYDROCHLORIDE 0.4 MG/1
0.4 CAPSULE ORAL DAILY
Status: DISCONTINUED | OUTPATIENT
Start: 2018-05-02 | End: 2018-05-04 | Stop reason: HOSPADM

## 2018-05-02 RX ORDER — LORATADINE 10 MG/1
10 TABLET ORAL AT BEDTIME
Status: DISCONTINUED | OUTPATIENT
Start: 2018-05-02 | End: 2018-05-04 | Stop reason: HOSPADM

## 2018-05-02 RX ADMIN — CLINDAMYCIN PHOSPHATE 900 MG: 18 INJECTION, SOLUTION INTRAVENOUS at 10:50

## 2018-05-02 RX ADMIN — LORATADINE 10 MG: 10 TABLET ORAL at 02:36

## 2018-05-02 RX ADMIN — CLINDAMYCIN PHOSPHATE 900 MG: 18 INJECTION, SOLUTION INTRAVENOUS at 19:11

## 2018-05-02 RX ADMIN — CLINDAMYCIN PHOSPHATE 900 MG: 18 INJECTION, SOLUTION INTRAVENOUS at 02:36

## 2018-05-02 RX ADMIN — FINASTERIDE 5 MG: 5 TABLET, FILM COATED ORAL at 09:04

## 2018-05-02 RX ADMIN — ASPIRIN 325 MG ORAL TABLET 325 MG: 325 PILL ORAL at 09:04

## 2018-05-02 RX ADMIN — SODIUM CHLORIDE: 9 INJECTION, SOLUTION INTRAVENOUS at 08:20

## 2018-05-02 RX ADMIN — LISINOPRIL 20 MG: 20 TABLET ORAL at 09:04

## 2018-05-02 RX ADMIN — ACETAMINOPHEN 650 MG: 325 TABLET ORAL at 09:04

## 2018-05-02 RX ADMIN — ACETAMINOPHEN 650 MG: 325 TABLET ORAL at 17:20

## 2018-05-02 RX ADMIN — LORATADINE 10 MG: 10 TABLET ORAL at 20:32

## 2018-05-02 RX ADMIN — TAMSULOSIN HYDROCHLORIDE 0.4 MG: 0.4 CAPSULE ORAL at 09:04

## 2018-05-02 RX ADMIN — DEXTRAN 70, AND HYPROMELLOSE 2910 1 DROP: 1; 3 SOLUTION/ DROPS OPHTHALMIC at 09:34

## 2018-05-02 RX ADMIN — ENOXAPARIN SODIUM 40 MG: 40 INJECTION SUBCUTANEOUS at 20:27

## 2018-05-02 ASSESSMENT — PAIN DESCRIPTION - DESCRIPTORS: DESCRIPTORS: ACHING

## 2018-05-02 ASSESSMENT — ACTIVITIES OF DAILY LIVING (ADL)
ADLS_ACUITY_SCORE: 9

## 2018-05-02 NOTE — PROGRESS NOTES
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  He was tachycardic with leukocytosis and low-grade fever meeting criteria for sepsis.  He has a penicillin allergy and was started on clindamycin and IV fluids.  On examination the erythema is much improved today he is hemodynamically stable.    Problem List:   1. Sepsis secondary to left lower extremity cellulitis: The erythema has receded from the outlined marker considerably.  This appears to be showing response to clindamycin.  We will continue clindamycin for the time being and follow-up blood cultures.  Stop IV fluids given his stability.  2. Upper airway obstruction: As above patient has been evaluated in the sleep clinic several times and is intolerant to any CPAP devices.  3. Type 2 diabetes mellitus: Hold metformin metformin given recent lactic acidosis but continue medium sliding scale insulin.  Blood glucose is well controlled.  4. Lactic acidosis: Likely due to metformin use in mild early sepsis.  Recheck lactic acid level tomorrow morning.  Stop IV fluids.  5. Hypertension: Blood pressure is currently slightly on the high side.  We will continue his prior to admission lisinopril.  6. BPH: Continue prior to admission tamsulosin and and asteroid.    # Pain Assessment:  Current Pain Score 5/2/2018   Patient currently in pain? yes   Pain score (0-10) 3   Pain location Back   Pain descriptors -   Lance johnson pain level was assessed and he currently denies pain.      DVT Prophylaxis: Enoxaparin (Lovenox) SQ  Code  Status: Full Code  Disposition: Expected discharge in 1-2 days to home. Goals prior to discharge include continue IV antibiotics.   Incidental Findings: None.  Family updated today: No     Interval History   Assumed care from previous hospitalist. The history was fully reviewed.  The patient reports doing well.  No chest pain or shortness of breath.  No nausea, vomiting, diarrhea, constipation.  No fevers.  Leg redness improving.  No other specific complaints identified.     -Data reviewed today: I personally reviewed all new labs and imaging results over the last 24 hours.     Physical Exam   Temp: 98.1  F (36.7  C) Temp src: Oral BP: 147/72 Pulse: 118 Heart Rate: 82 Resp: 20 SpO2: 95 % O2 Device: None (Room air)    Vitals:    05/01/18 1333 05/01/18 1921   Weight: 115.7 kg (255 lb) 119.2 kg (262 lb 12.8 oz)     Vital Signs with Ranges  Temp:  [98  F (36.7  C)-100.2  F (37.9  C)] 98.1  F (36.7  C)  Pulse:  [118] 118  Heart Rate:  [] 82  Resp:  [13-27] 20  BP: (123-167)/(62-95) 147/72  SpO2:  [83 %-98 %] 95 %  I/O last 3 completed shifts:  In: 896 [P.O.:200; I.V.:696]  Out: 1775 [Urine:1775]    GENERAL: No apparent distress. Awake, alert, and fully oriented.  HEENT: Normocephalic, atraumatic. Extraocular movements intact.  CARDIOVASCULAR: Regular rate and rhythm without murmurs or rubs. No S3.  PULMONARY: Clear bilaterally.  GASTROINTESTINAL: Soft, non-tender, non-distended. Bowel sounds normoactive.   EXTREMITIES: No cyanosis or clubbing. No edema.  NEUROLOGICAL: CN 2-12 grossly intact, no focal neurological deficits.  DERMATOLOGICAL: Left leg with mild erythema much improved from outlined marking.  No other rash, ulcer, bruising, nor jaundice.     Medications       aspirin  325 mg Oral Daily     clindamycin  900 mg Intravenous Q8H     docusate sodium  100 mg Oral BID     enoxaparin  40 mg Subcutaneous Q24H     finasteride  5 mg Oral Daily     hypromellose-dextran  1 drop Both Eyes QAM     insulin aspart  1-7  Units Subcutaneous TID AC     insulin aspart  1-5 Units Subcutaneous At Bedtime     lisinopril  20 mg Oral Daily     loratadine  10 mg Oral At Bedtime     tamsulosin  0.4 mg Oral Daily     Data     Laboratory:    Recent Labs  Lab 05/02/18  0720 05/01/18 1951 05/01/18  1332   WBC 13.0*  --  21.1*   HGB 11.7*  --  13.1*   HCT 36.7*  --  41.0   MCV 94  --  93    193 240       Recent Labs  Lab 05/02/18  0720 05/01/18 1951 05/01/18  1332     --  137   POTASSIUM 4.4  --  4.4   CHLORIDE 104  --  103   CO2 26  --  24   ANIONGAP 7  --  10   *  --  106*   BUN 14  --  19   CR 0.96 1.06 0.93   GFRESTIMATED 77 68 80   GFRESTBLACK >90 83 >90   SHANNAN 8.6  --  8.8       Recent Labs  Lab 05/02/18  1219 05/02/18  0720 05/02/18  0235 05/01/18  2120 05/01/18  1924 05/01/18  1332   GLC  --  124*  --   --   --  106*   *  --  120* 151* 184*  --        Recent Labs  Lab 05/01/18  1720 05/01/18  1548   LACT 2.6* 3.4*       Recent Labs  Lab 05/01/18  1610 05/01/18  1549 05/01/18  1546   CULT Culture negative < 24 hours, reincubate Culture in progress  No growth after 10 hours No growth after 10 hours       Imaging:  Recent Results (from the past 24 hour(s))   CT Head w/o Contrast    Narrative    CT OF THE HEAD WITHOUT CONTRAST  5/1/2018 3:45 PM     COMPARISON: Brain MRI 1/29/2009.    HISTORY:  Headache.     TECHNIQUE: 5 mm thick axial CT images of the head were acquired  without IV contrast material.    FINDINGS:  There is mild diffuse cerebral volume loss. There are  subtle patchy areas of decreased density in the cerebral white matter  bilaterally that are consistent with sequela of chronic small vessel  ischemic disease.     The ventricles and basal cisterns are within normal limits in  configuration given the degree of cerebral volume loss.  There is no  midline shift. There are no extra-axial fluid collections.     No intracranial hemorrhage, mass or recent infarct.    The visualized paranasal sinuses are well  aerated. There is no  mastoiditis. There are no fractures of the visualized bones.       Impression    IMPRESSION:  Diffuse cerebral volume loss and cerebral white matter  changes consistent with chronic small vessel ischemic disease. No  evidence for acute intracranial pathology.     Radiation dose for this scan was reduced using automated exposure  control, adjustment of the mA and/or kV according to patient size, or  iterative reconstruction technique.    SUJIT FORREST MD   XR Chest 2 Views    Narrative    CHEST TWO VIEWS  5/1/2018  4:07 PM     HISTORY: Fever and WBC.     COMPARISON: 7/17/2004    FINDINGS: Heart size and pulmonary vascularity are within normal  limits. The lungs are clear. No pneumothorax or pleural effusion.       Impression    IMPRESSION: No radiographic evidence of acute chest abnormality.     MD Lance IBARRA DO MPH  Formerly [facility] Hospitalist  Formerly named [facility] [place] [address]  Pager: [phone]  05/02/2018

## 2018-05-02 NOTE — PLAN OF CARE
Problem: Patient Care Overview  Goal: Plan of Care/Patient Progress Review  Outcome: Improving  Ambulatory Status:  Pt up SBA to BR.  LLE elevated on 2 pillows in bed.  VS:  AFVSS.  On RA.  Pain:  3/10 in back- tylenol given x 1.  Resp: LS clear.  GI:  Denies nausea.  Good appetite and on mod cho diet.   and 114.  BS hypoactive.  Passing flatus.  Last BM 5/2.  :  Voiding adequate amounts without difficulty- using urinal at bedside.  Skin:  LLE continues to be dark red with scattered spots extending outward from primary site without excessive warmth or swelling.  Skin intact without drainage- TANVI.  Tx:  IV cleocin; PIV SL.   Labs:  WBC down to 13; BC NGTD.  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

## 2018-05-02 NOTE — H&P
Admitted:     05/01/2018      CHIEF COMPLAINT:  Fevers, lightheadedness, dizziness, headache.      HISTORY OF PRESENT ILLNESS:  Mr. Ibrahim is a 73-year-old male with a history of untreated obstructive sleep apnea, diabetes mellitus, chronic rhinitis, who presented to the hospital today for the above concerns.  The patient reports that he was feeling lightheaded and weak this morning.  He felt fatigued.  He went to work at 9:00 in the morning.  He reports that about a half hour later, he was apparently woken up by one of his colleagues who recommended that he go home because the patient reportedly looked unwell.  The patient felt lightheaded and felt wobbly while walking.  He also had a bitemporal headache.  The patient decided to go home.  After he was at home, he felt worse, which prompted his appearance here in the Emergency Department.      On arrival to the ER, vital signs included blood pressure 132/75 with a heart rate of 118, temperature 99.7 degrees, saturation 89% on room air.  His workup in the ER included labs and imaging.  White cell count was 21,000.  Complete metabolic panel unremarkable.  Lactate was 3.4 initially, down to 2.6 with fluid hydration.  Urinalysis unremarkable.  Influenza titer negative.  Rapid Strep screen was negative.  TSH was normal.  Magnesium was normal.  Blood cultures obtained and pending.  Imaging included a head CT scan showing no acute findings.  Chest x-ray showed no acute findings.  I spoke with Dr. Gaspar of the ER who is requesting admission of this patient to the hospital for sepsis.      The patient had no localizing symptoms when I was interviewing him.  Specifically, no abdominal pain, no diarrhea, no cough or shortness of breath, no dysuria, and no other symptoms beyond that described above.      PAST MEDICAL HISTORY:   1.  Obstructive sleep apnea, untreated and apparently intolerant to masks.   2.  History of CVA in 1993 with no residual weakness.   3.  Diabetes  mellitus, controlled with metformin.   4.  Gout.   5.  BPH.   6.  Chronic rhinitis symptoms.   7.  Left shoulder surgery.   8.  Ventral hernia repair.      CURRENT MEDICATIONS:  Await pharmacy reconciliation of medicine list.  Medications appear to include the followin.  Metformin.   2.  Ferrous gluconate.   3.  Finasteride.   4.  Flonase.   5.  Lisinopril.   6.  Claritin.   7.  Multivitamin.   8.  Naprosyn.   9.  Systane Balance.   10.  Tamsulosin.      ALLERGIES:   1.  PENICILLIN.   2.  SULFA.      The patient reports that these allergies were noted on patch testing many years ago.      FAMILY HISTORY:  Reviewed.  Nothing contributory to this admission.      SOCIAL HISTORY:  The patient quit smoking in  at the time of his stroke.  Social alcohol use.  He is  and lives at home with his wife.      REVIEW OF SYSTEMS:  See HPI for details.  Comprehensive greater than 10-point review of systems otherwise negative besides that detailed above.      PHYSICAL EXAMINATION:   VITAL SIGNS:  Blood pressure is currently 160/85 with a heart rate of 101, temperature 99.3 degrees currently, and saturation 97% on room air.   GENERAL:  The patient appears a bit flushed.  He does not appear toxic.  He is awake, alert and oriented x 3.  Looks comfortable.  Good historian.   HEENT:  Head is atraumatic.  Sclerae are white.  Eyelids are normal.  Conjunctivae normal.  Extraocular movements are intact.   NECK:  Supple.  No cervical or supraclavicular lymphadenopathy.   HEART:  Mildly tachycardic.  Rhythm is regular.  No significant murmurs.  No lower extremity edema.   LUNGS:  Clear to auscultation bilateral.  No intercostal retractions.  No conversational dyspnea.   ABDOMEN:  Nontender, nondistended, soft.  No masses.  No organomegaly.   EXTREMITIES:  No edema.   SKIN:  Redness of the left lower extremity, which is well demarcated.  The center of the redness is a darker area which looks like an erythematous macule with a  lot of redness surrounding this.  This was outlined by myself with a pen in the ER.  Otherwise, skin shows no rash, no jaundice.  Skin is dry to touch.   NEUROLOGIC:  Cranial nerves II-XII are intact.  Moves all extremities appropriately.  Sensation intact to light touch in upper and lower extremities bilaterally.   PSYCHIATRIC:  The patient awake, alert and oriented x 3.  Mood and affect are normal and appropriate.      LABORATORY AND IMAGING DATA:  Reviewed above in HPI.      IMPRESSION AND PLAN:  Mr. Sahu is a 73-year-old male who presents to the hospital today with fevers, weakness, fatigue.  On physical examination, he is found to have left lower extremity redness that appears consistent with cellulitis.  He has tachycardia and leukocytosis in conjunction with his low-grade fevers consistent with sepsis.   1.  Sepsis secondary to left lower extremity cellulitis.   2.  Obstructive sleep apnea, intolerant of treatment.   3.  Diabetes mellitus, on oral medication.   4.  History of chronic rhinitis.      PLAN:   1.  Clindamycin IV (PCN allergy).   2.  Elevate left lower extremity.   3.  IV fluid hydration overnight.   4.  Resume home medications when clarified by pharmacy, but hold metformin for now in the setting of elevated lactate and sepsis.  Cover with insulin sliding scale as needed.   5.  Repeat labs in the morning including white blood cell count.   6.  Anticipate at least 2-3 days in the hospital.  We will admit to inpatient status for sepsis and need for IV antibiotics.         JAYLEN GLASS MD             D: 2018   T: 2018   MT: LW      Name:     CURTIS SAHU   MRN:      [id number]        Account:      [id number]   :      1945        Admitted:     2018                   Document: [id number]

## 2018-05-02 NOTE — PLAN OF CARE
Problem: Patient Care Overview  Goal: Plan of Care/Patient Progress Review  Outcome: No Change  Pt remains hospitalized for left leg cellulitis.   Max temp 100.2, recheck 98.0, tachy, other vital signs stable, on RA, denies pain.   Up with SBA, using urinal at bedside.   /hr, continues IV Cleocin.   BG check 120  LLE warm to touch, redness, leg elevated this shift.   Will continue to monitor.

## 2018-05-02 NOTE — ED NOTES
Pt ambulated to bathroom, unmeasured urine output. Pt ate hot meal with beverage sent from cafeteria.

## 2018-05-02 NOTE — PLAN OF CARE
Problem: Patient Care Overview  Goal: Plan of Care/Patient Progress Review  Outcome: No Change  Admitted from the ED this shift, A&Ox4, SBA, mod-cho diet, voiding without difficulty, not passing gas, tachycardic, lung sounds clear, edema in lower extremities, LLE rash red in color- borders marked, IV fluids running.

## 2018-05-03 LAB
ANION GAP SERPL CALCULATED.3IONS-SCNC: 5 MMOL/L (ref 3–14)
BACTERIA SPEC CULT: ABNORMAL
BUN SERPL-MCNC: 18 MG/DL (ref 7–30)
CALCIUM SERPL-MCNC: 8.9 MG/DL (ref 8.5–10.1)
CHLORIDE SERPL-SCNC: 104 MMOL/L (ref 94–109)
CO2 SERPL-SCNC: 28 MMOL/L (ref 20–32)
CREAT SERPL-MCNC: 0.94 MG/DL (ref 0.66–1.25)
ERYTHROCYTE [DISTWIDTH] IN BLOOD BY AUTOMATED COUNT: 15.7 % (ref 10–15)
GFR SERPL CREATININE-BSD FRML MDRD: 78 ML/MIN/1.7M2
GLUCOSE BLDC GLUCOMTR-MCNC: 103 MG/DL (ref 70–99)
GLUCOSE BLDC GLUCOMTR-MCNC: 135 MG/DL (ref 70–99)
GLUCOSE BLDC GLUCOMTR-MCNC: 139 MG/DL (ref 70–99)
GLUCOSE BLDC GLUCOMTR-MCNC: 145 MG/DL (ref 70–99)
GLUCOSE BLDC GLUCOMTR-MCNC: 80 MG/DL (ref 70–99)
GLUCOSE SERPL-MCNC: 116 MG/DL (ref 70–99)
HCT VFR BLD AUTO: 37.5 % (ref 40–53)
HGB BLD-MCNC: 11.6 G/DL (ref 13.3–17.7)
LACTATE BLD-SCNC: 1.3 MMOL/L (ref 0.7–2)
Lab: ABNORMAL
MCH RBC QN AUTO: 29 PG (ref 26.5–33)
MCHC RBC AUTO-ENTMCNC: 30.9 G/DL (ref 31.5–36.5)
MCV RBC AUTO: 94 FL (ref 78–100)
PLATELET # BLD AUTO: 203 10E9/L (ref 150–450)
POTASSIUM SERPL-SCNC: 4.3 MMOL/L (ref 3.4–5.3)
RBC # BLD AUTO: 4 10E12/L (ref 4.4–5.9)
SODIUM SERPL-SCNC: 137 MMOL/L (ref 133–144)
SPECIMEN SOURCE: ABNORMAL
WBC # BLD AUTO: 11.3 10E9/L (ref 4–11)

## 2018-05-03 PROCEDURE — 85027 COMPLETE CBC AUTOMATED: CPT | Performed by: HOSPITALIST

## 2018-05-03 PROCEDURE — 25000132 ZZH RX MED GY IP 250 OP 250 PS 637: Performed by: INTERNAL MEDICINE

## 2018-05-03 PROCEDURE — 25000128 H RX IP 250 OP 636: Performed by: INTERNAL MEDICINE

## 2018-05-03 PROCEDURE — 12000000 ZZH R&B MED SURG/OB

## 2018-05-03 PROCEDURE — 99232 SBSQ HOSP IP/OBS MODERATE 35: CPT | Performed by: INTERNAL MEDICINE

## 2018-05-03 PROCEDURE — 36415 COLL VENOUS BLD VENIPUNCTURE: CPT | Performed by: HOSPITALIST

## 2018-05-03 PROCEDURE — 83605 ASSAY OF LACTIC ACID: CPT | Performed by: HOSPITALIST

## 2018-05-03 PROCEDURE — 80048 BASIC METABOLIC PNL TOTAL CA: CPT | Performed by: HOSPITALIST

## 2018-05-03 PROCEDURE — 25000125 ZZHC RX 250: Performed by: INTERNAL MEDICINE

## 2018-05-03 PROCEDURE — 00000146 ZZHCL STATISTIC GLUCOSE BY METER IP

## 2018-05-03 RX ADMIN — CLINDAMYCIN PHOSPHATE 900 MG: 18 INJECTION, SOLUTION INTRAVENOUS at 03:04

## 2018-05-03 RX ADMIN — DEXTRAN 70, AND HYPROMELLOSE 2910 1 DROP: 1; 3 SOLUTION/ DROPS OPHTHALMIC at 08:11

## 2018-05-03 RX ADMIN — CLINDAMYCIN PHOSPHATE 900 MG: 18 INJECTION, SOLUTION INTRAVENOUS at 11:30

## 2018-05-03 RX ADMIN — FINASTERIDE 5 MG: 5 TABLET, FILM COATED ORAL at 08:11

## 2018-05-03 RX ADMIN — ASPIRIN 325 MG ORAL TABLET 325 MG: 325 PILL ORAL at 08:11

## 2018-05-03 RX ADMIN — LISINOPRIL 20 MG: 20 TABLET ORAL at 08:11

## 2018-05-03 RX ADMIN — LORATADINE 10 MG: 10 TABLET ORAL at 19:20

## 2018-05-03 RX ADMIN — METFORMIN HYDROCHLORIDE 1000 MG: 500 TABLET ORAL at 18:26

## 2018-05-03 RX ADMIN — TAMSULOSIN HYDROCHLORIDE 0.4 MG: 0.4 CAPSULE ORAL at 08:11

## 2018-05-03 RX ADMIN — CLINDAMYCIN PHOSPHATE 900 MG: 18 INJECTION, SOLUTION INTRAVENOUS at 18:26

## 2018-05-03 RX ADMIN — ENOXAPARIN SODIUM 40 MG: 40 INJECTION SUBCUTANEOUS at 19:20

## 2018-05-03 RX ADMIN — ACETAMINOPHEN 650 MG: 325 TABLET ORAL at 08:11

## 2018-05-03 ASSESSMENT — ACTIVITIES OF DAILY LIVING (ADL)
ADLS_ACUITY_SCORE: 9

## 2018-05-03 NOTE — PROGRESS NOTES
[facility]  Hospitalist Progress Note  Vidal Newman MD 05/03/18    Reason for Stay (Diagnosis): LLE cellulitis         Assessment and Plan:      Summary of Stay:  Lance Ibrahim is a 73 year old male with history of DM2, BPH, obesity, urinary retention, upper airway obstruction intolerant to CPAP, and cerebrovascular disease who was admitted on 5/1/2018 with left lower leg swelling and redness consistent with cellulitis.  Initially tachycardic, leukocytosis to 21, and low-grade fevers consistent with sepsis.  Lactic acid was elevated that resolved with IV fluids.  He was started on IV clindamycin due to a penicillin allergy.  Clinically improving is now afebrile with reduction in WBC count.  Continue IV clindamycin today and transition to orals tomorrow likely if ongoing improvement.    Problem List/Assessment and Plan:   Sepsis secondary to LLE cellulitis: Erythema, swelling, and pain of the left lower leg.  Consistent with sepsis with tachycardia, leukocytosis to 21, and low-grade fevers on admission.  Lactic acid was 3.4.  Has penicillin allergy and was started on clindamycin IV.  Clinically the leg appears to be improving well inside the margins.  Has some darker red readiness to the mid shin that will persist.  Leukocytosis improving.  -Continue IV clindamycin today and likely transition to p.o. tomorrow  -Blood cultures NGTD    Elevated lactic acid: Lactate 3.4 secondary sepsis.  Resolved with IV fluids.    Upper airway obstruction: Patient this is not BRIANNA, but does have known upper airway obstruction.  Intolerant to CPAP in the past has been evaluated in sleep clinic several times..    Type II DM: A1c 6.7.  PT and Metformin has been held due to elevated lactic acid she is now resolved.  -Resume home metformin  -We give dose SSI    HTN: Resume PTA lisinopril 20 mg daily.    BPH: Continue PTA tamsulosin and finasteride    Obesity: BMI 35.6.    # Pain Assessment:  Current Pain Score 5/3/2018    Patient currently in pain? yes   Pain score (0-10) 3   Pain location Back   Pain descriptors -   - Lance is experiencing pain due to LLE cellulitis. Pain management was discussed and the plan was created in a collaborative fashion.  Lance's response to the current recommendations: engaged  -Tylenol as needed        DVT Prophylaxis: Enoxaparin (Lovenox) SQ  Code Status: Full Code  FEN: Consistent carb medium  Discharge Dispo: Home  Estimated Disch Date / # of Days until Disch: Possible discharge tomorrow if ongoing improvement on IV antibiotics        Interval History (Subjective):      Assumed care today.  Patient having some left lower extremity pain with sensitivity to light touch.  Ongoing left leg swelling as well.  Area of erythema has improved.  No fevers overnight.                  Physical Exam:      Last Vital Signs:  /76 (BP Location: Right arm)  Pulse 82  Temp 98.9  F (37.2  C) (Oral)  Resp 16  Ht 1.829 m (6')  Wt 119.2 kg (262 lb 12.8 oz)  SpO2 96%  BMI 35.64 kg/m2      Intake/Output Summary (Last 24 hours) at 05/03/18 1236  Last data filed at 05/03/18 1227   Gross per 24 hour   Intake              720 ml   Output             1875 ml   Net            -1155 ml       Constitutional: Awake, NAD  Eyes: sclera white   HEENT: MMM  Respiratory: no respiratory distress, lungs cta bilaterally, no crackles or wheeze  Cardiovascular: RRR.  No murmur  GI: Obese, non-tender, not distended, bowel sounds present  Skin: Area of dark red on the left shin.  No significant surrounding warm erythema and well within the pen markings  Musculoskeletal/extremities: 1+ LE edema.  Mild tenderness to light touch palpation  Neurologic: A&Ox3, strength and light touch sensation grossly normal  Psychiatric: calm, cooperative, normal affect         Medications:      All current medications were reviewed with changes reflected in problem list.         Data:      All new lab and imaging data was reviewed.   Labs:    Recent  Labs  Lab 05/01/18  1610 05/01/18  1549 05/01/18  1546   CULT No growth No growth after 2 days  Light growthBeta hemolytic Streptococcus group B* No growth after 2 days       Recent Labs  Lab 05/03/18  0643 05/02/18  0720 05/01/18 1951 05/01/18  1332    137  --  137   POTASSIUM 4.3 4.4  --  4.4   CHLORIDE 104 104  --  103   CO2 28 26  --  24   ANIONGAP 5 7  --  10   * 124*  --  106*   BUN 18 14  --  19   CR 0.94 0.96 1.06 0.93   GFRESTIMATED 78 77 68 80   GFRESTBLACK >90 >90 83 >90   SHANNAN 8.9 8.6  --  8.8       Recent Labs  Lab 05/03/18  0643 05/02/18  0720 05/01/18 1951 05/01/18  1332   WBC 11.3* 13.0*  --  21.1*   HGB 11.6* 11.7*  --  13.1*   HCT 37.5* 36.7*  --  41.0   MCV 94 94  --  93    188 193 240   Lactic acid 1.3    Imaging:   None today      Vidal Newman MD

## 2018-05-03 NOTE — PLAN OF CARE
Problem: Skin and Soft Tissue Infection (Adult)  Goal: Signs and Symptoms of Listed Potential Problems Will be Absent, Minimized or Managed (Skin and Soft Tissue Infection)  Signs and symptoms of listed potential problems will be absent, minimized or managed by discharge/transition of care (reference Skin and Soft Tissue Infection (Adult) CPG).   Outcome: No Change  Pt remains hospitalized for weakness and rash to left leg.   Left LE remains red, warm to touch, leg marked, not receding marking.  Vital signs stable, denies any pain, on RA.   PIV saline locked, continues IV Cleocin.   Up with SBA, no alarms, calls approp.   Using urinal at bedside with good output.   BG check 135  Will continue to monitor.

## 2018-05-03 NOTE — PLAN OF CARE
Problem: Skin and Soft Tissue Infection (Adult)  Goal: Signs and Symptoms of Listed Potential Problems Will be Absent, Minimized or Managed (Skin and Soft Tissue Infection)  Signs and symptoms of listed potential problems will be absent, minimized or managed by discharge/transition of care (reference Skin and Soft Tissue Infection (Adult) CPG).   Outcome: Improving  Ao, vss. Redness outline has improved since admission. Denies pain or numbness, tingling. Showered this evening. Up SBA.

## 2018-05-03 NOTE — PLAN OF CARE
Problem: Patient Care Overview  Goal: Plan of Care/Patient Progress Review  Outcome: No Change  /61 (BP Location: Right arm)  Pulse 82  Temp 97.4  F (36.3  C) (Oral)  Resp 18  Ht 1.829 m (6')  Wt 119.2 kg (262 lb 12.8 oz)  SpO2 93%  BMI 35.64 kg/m2  Neuro: A&O x4  Pain: C/o pain in back, reports relief with PRN tylenol  Resp: LS clear, on RA, no SOB  Cardiac: WDL  GI/: bowel sounds positive, voiding appropriately   Diet: tolerating mod carb diet  Skin/mobility: cellulitis to LLE, border outlined, remains angry red and warm to touch. Up with SBA  Tx/plan:  IV cleocin, BC pending, foot elevated.   Will continue to monitor and provide supportive care.

## 2018-05-03 NOTE — PLAN OF CARE
Problem: Patient Care Overview  Goal: Plan of Care/Patient Progress Review  Outcome: Improving  Ambulatory Status:  Pt up SBA.  Encouraged LLE elevation with 2-3 pillows.  VS:  AFVSS.  On RA.  Pain:  3/10 in back- tylenol given x 1.  Resp: LS clear.  GI:  Denies nausea.  Good appetite and on mod cho diet.   and 103.  BS hypoactive.  Passing flatus.  Last BM 5/3.  :  Voiding without difficulty using urinal at bedside.  Skin:  Dark red area on LLE persists- no surrounding erythema present; warm, slightly swollen.  Tx:  IV cleocin.  Metformin resumed for tonight.    Labs:  WBC 11.3; lactic 1.3.  Disposition:  Likely D/C home tomorrow on P.O. Cleocin.

## 2018-05-04 VITALS
HEIGHT: 72 IN | DIASTOLIC BLOOD PRESSURE: 63 MMHG | WEIGHT: 262.8 LBS | SYSTOLIC BLOOD PRESSURE: 132 MMHG | RESPIRATION RATE: 18 BRPM | OXYGEN SATURATION: 97 % | HEART RATE: 91 BPM | BODY MASS INDEX: 35.59 KG/M2 | TEMPERATURE: 96.4 F

## 2018-05-04 LAB
CREAT SERPL-MCNC: 0.95 MG/DL (ref 0.66–1.25)
ERYTHROCYTE [DISTWIDTH] IN BLOOD BY AUTOMATED COUNT: 15.5 % (ref 10–15)
GFR SERPL CREATININE-BSD FRML MDRD: 78 ML/MIN/1.7M2
GLUCOSE BLDC GLUCOMTR-MCNC: 113 MG/DL (ref 70–99)
GLUCOSE BLDC GLUCOMTR-MCNC: 129 MG/DL (ref 70–99)
HCT VFR BLD AUTO: 41.4 % (ref 40–53)
HGB BLD-MCNC: 12.9 G/DL (ref 13.3–17.7)
MCH RBC QN AUTO: 28.7 PG (ref 26.5–33)
MCHC RBC AUTO-ENTMCNC: 31.2 G/DL (ref 31.5–36.5)
MCV RBC AUTO: 92 FL (ref 78–100)
PLATELET # BLD AUTO: 227 10E9/L (ref 150–450)
RBC # BLD AUTO: 4.5 10E12/L (ref 4.4–5.9)
WBC # BLD AUTO: 8.6 10E9/L (ref 4–11)

## 2018-05-04 PROCEDURE — 99239 HOSP IP/OBS DSCHRG MGMT >30: CPT | Performed by: INTERNAL MEDICINE

## 2018-05-04 PROCEDURE — 25000125 ZZHC RX 250: Performed by: INTERNAL MEDICINE

## 2018-05-04 PROCEDURE — 85027 COMPLETE CBC AUTOMATED: CPT | Performed by: INTERNAL MEDICINE

## 2018-05-04 PROCEDURE — 25000132 ZZH RX MED GY IP 250 OP 250 PS 637: Performed by: INTERNAL MEDICINE

## 2018-05-04 PROCEDURE — 82565 ASSAY OF CREATININE: CPT | Performed by: INTERNAL MEDICINE

## 2018-05-04 PROCEDURE — 85049 AUTOMATED PLATELET COUNT: CPT | Performed by: INTERNAL MEDICINE

## 2018-05-04 PROCEDURE — 36415 COLL VENOUS BLD VENIPUNCTURE: CPT | Performed by: INTERNAL MEDICINE

## 2018-05-04 PROCEDURE — 00000146 ZZHCL STATISTIC GLUCOSE BY METER IP

## 2018-05-04 RX ORDER — CLINDAMYCIN HCL 300 MG
300 CAPSULE ORAL 4 TIMES DAILY
Qty: 28 CAPSULE | Refills: 0 | Status: SHIPPED | OUTPATIENT
Start: 2018-05-04 | End: 2019-02-17

## 2018-05-04 RX ADMIN — METFORMIN HYDROCHLORIDE 1000 MG: 500 TABLET ORAL at 09:46

## 2018-05-04 RX ADMIN — DEXTRAN 70, AND HYPROMELLOSE 2910 1 DROP: 1; 3 SOLUTION/ DROPS OPHTHALMIC at 09:49

## 2018-05-04 RX ADMIN — LISINOPRIL 20 MG: 20 TABLET ORAL at 09:45

## 2018-05-04 RX ADMIN — ASPIRIN 325 MG ORAL TABLET 325 MG: 325 PILL ORAL at 09:46

## 2018-05-04 RX ADMIN — FINASTERIDE 5 MG: 5 TABLET, FILM COATED ORAL at 09:45

## 2018-05-04 RX ADMIN — TAMSULOSIN HYDROCHLORIDE 0.4 MG: 0.4 CAPSULE ORAL at 09:46

## 2018-05-04 RX ADMIN — CLINDAMYCIN PHOSPHATE 900 MG: 18 INJECTION, SOLUTION INTRAVENOUS at 02:31

## 2018-05-04 ASSESSMENT — ACTIVITIES OF DAILY LIVING (ADL)
ADLS_ACUITY_SCORE: 9

## 2018-05-04 NOTE — PLAN OF CARE
Problem: Patient Care Overview  Goal: Plan of Care/Patient Progress Review  Outcome: Improving  VSS. Alert and Oriented. Up with SBA. Denies pain. .

## 2018-05-04 NOTE — DISCHARGE SUMMARY
[facility]  Discharge Summary  Name: Lance Ibrahim    MRN: [id number]  YOB: 1945    Age: 73 year old  Date of Discharge:  5/4/2018 12:16 PM  Date of Admission: 5/1/2018  Primary Care Provider: Anh Spivey  Discharge Physician:  Vidal Newman MD  Discharging Service:  Hospitalist      Discharge Diagnoses:  Sepsis secondary to LLE cellulitis  Elevated lactic acid  Upper airway obstruction  Type 2 diabetes mellitus  HTN  BPH  Obesity     Hospital Course:  Summary of Stay:  Lance Ibrahim is a 73 year old male with history of DM2, BPH, obesity, urinary retention, upper airway obstruction intolerant to CPAP, and cerebrovascular disease who was admitted on 5/1/2018 with left lower leg swelling and redness consistent with cellulitis.  Initially tachycardic, leukocytosis to 21, and low-grade fevers consistent with sepsis.  Lactic acid was elevated that resolved with IV fluids.  He was started on IV clindamycin due to a penicillin allergy.  Clinically improving is now afebrile and leukocytosis is resolved.  Transition to p.o. clindamycin for discharge.  Follow-up with PCP within 1 week for skin check.     Problem List/Assessment and Plan:   Sepsis secondary to LLE cellulitis: Erythema, swelling, and pain of the left lower leg.  Consistent with sepsis with tachycardia, leukocytosis to 21, and low-grade fevers on admission.  Lactic acid was 3.4.  Has penicillin allergy and was started on clindamycin IV.  Clinically the leg appears to be improving well inside the margins.  Has some darker red readiness to the mid shin that will persist.  Leukocytosis resolved.  Blood cultures NGTD.  Continue with p.o. clindamycin 300 mg 4 times daily for 7 more days and follow-up with PCP during this time for skin check.     Elevated lactic acid: Lactate 3.4 secondary sepsis.  Resolved with IV fluids.     Upper airway obstruction: Patient this is not BRIANNA, but does have known upper airway obstruction.   "Intolerant to CPAP in the past has been evaluated in sleep clinic several times..     Type II DM: A1c 6.7.  PT and Metformin has been held due to elevated lactic acid she is now resolved.  Resumed home metformin     HTN: Resumed PTA lisinopril 20 mg daily.     BPH: Resumed PTA tamsulosin and finasteride     Obesity: BMI 35.6.     Discharge Disposition:  Discharged to home     Allergies:  Allergies   Allergen Reactions     Penicillins      \"anaphylactic\"     Sulfa Drugs      \"itchy rash, swelling of face and hands\"        Discharge Medications:   Current Discharge Medication List      START taking these medications    Details   clindamycin (CLEOCIN) 300 MG capsule Take 1 capsule (300 mg) by mouth 4 times daily for 7 days  Qty: 28 capsule, Refills: 0    Associated Diagnoses: Left leg cellulitis         CONTINUE these medications which have NOT CHANGED    Details   aspirin 325 MG tablet Take 325 mg by mouth daily       ferrous gluconate (FERGON) 324 (38 FE) MG tablet Take 1 tablet by mouth daily.      finasteride (PROSCAR) 5 MG tablet Take 5 mg by mouth daily.      fluticasone (FLONASE) 50 MCG/ACT spray SPRAY 1-2 SPRAYS INTO BOTH NOSTRILS DAILY  Qty: 48 g, Refills: 1    Associated Diagnoses: Chronic rhinitis, unspecified type      lisinopril (PRINIVIL/ZESTRIL) 10 MG tablet TAKE 2 TABLETS (20 MG) BY MOUTH DAILY  Qty: 180 tablet, Refills: 3    Associated Diagnoses: Essential hypertension      loratadine (CLARITIN) 10 MG tablet Take 10 mg by mouth At Bedtime      metFORMIN (GLUCOPHAGE) 1000 MG tablet TAKE 1 TABLET (1,000 MG) BY MOUTH 2 TIMES DAILY (WITH MEALS)  Qty: 180 tablet, Refills: 1    Comments: Pt due for April appt/labs. Needs to call our office.  Associated Diagnoses: Type 2 diabetes mellitus without complication, without long-term current use of insulin (H)      Multiple Vitamins-Minerals (CENTRUM SILVER) per tablet Take 1 tablet by mouth daily.      naproxen sodium (ALEVE) 220 MG capsule Take 220 mg by mouth as " needed.      propylene glycol (SYSTANE BALANCE) 0.6 % SOLN Place 1 drop into both eyes every morning       tamsulosin (FLOMAX) 0.4 MG 24 hr capsule Take 1 capsule by mouth daily.              Condition on Discharge:  Discharge condition: Good   Discharge vitals: Blood pressure 160/78, pulse 91, temperature 96.4  F (35.8  C), temperature source Oral, resp. rate 18, height 1.829 m (6'), weight 119.2 kg (262 lb 12.8 oz), SpO2 97 %.   Code status on discharge: Full Code     History of Illness:  See detailed admission note for full details.    Physical Exam:  Blood pressure 160/78, pulse 91, temperature 96.4  F (35.8  C), temperature source Oral, resp. rate 18, height 1.829 m (6'), weight 119.2 kg (262 lb 12.8 oz), SpO2 97 %.  Wt Readings from Last 1 Encounters:   05/01/18 119.2 kg (262 lb 12.8 oz)     Constitutional: Awake, NAD  Eyes: sclera white   HEENT: atraumatic, MMM  Respiratory: no respiratory distress, lungs cta bilaterally, no crackles or wheeze  Cardiovascular: RRR.  1/6 systolic murmur  GI: Obese, non-tender, not distended, bowel sounds present  Skin: Antonino red area to left anterior shin.  No surrounding pink warm erythema.  No open lesions.  Well within pen markings.  Musculoskeletal/extremities: Trace to 1+ LLE edema  Neurologic: A&O, which clear, light touch sensation intact  Psychiatric: calm, cooperative, normal affect    Procedures other than Imaging:  None     Imaging:  No results found for this or any previous visit (from the past 24 hour(s)).     Consultations:  No consultations were requested during this admission.       Recent Lab Results:    Recent Labs  Lab 05/04/18  0749 05/03/18  0643 05/02/18  0720   WBC 8.6 11.3* 13.0*   HGB 12.9* 11.6* 11.7*   HCT 41.4 37.5* 36.7*   MCV 92 94 94    203 188       Recent Labs  Lab 05/04/18  0749 05/03/18  0643 05/02/18  0720  05/01/18  1332   NA  --  137 137  --  137   POTASSIUM  --  4.3 4.4  --  4.4   CHLORIDE  --  104 104  --  103   CO2  --  28 26  --   24   ANIONGAP  --  5 7  --  10   GLC  --  116* 124*  --  106*   BUN  --  18 14  --  19   CR 0.95 0.94 0.96  < > 0.93   GFRESTIMATED 78 78 77  < > 80   GFRESTBLACK >90 >90 >90  < > >90   SHANNAN  --  8.9 8.6  --  8.8   < > = values in this interval not displayed.    Recent Labs  Lab 05/01/18  1610 05/01/18  1549 05/01/18  1546   CULT No growth No growth after 3 days  Light growthBeta hemolytic Streptococcus group B* No growth after 3 days       Recent Labs  Lab 05/03/18  0643 05/01/18  1720 05/01/18  1548   LACT 1.3 2.6* 3.4*          Pending Results:    Unresulted Labs Ordered in the Past 30 Days of this Admission     Date and Time Order Name Status Description    5/1/2018 1532 Blood culture Preliminary     5/1/2018 1528 Blood culture Preliminary          These results will be followed up by patient's primary care provider.    Discharge Instructions and Follow-Up:     Discharge Procedure Orders  Reason for your hospital stay   Order Comments: You were hospitalized for left leg cellulitis.  This improved with IV antibiotics.  Please complete the full course of oral clindamycin on discharge.  Elevate your left leg as much as possible to help with swelling.  If developing significant symptoms of fevers or diarrhea please be evaluated by your doctor.     Follow-up and recommended labs and tests    Order Comments: Follow up with primary care provider, Anh Spivey, within 7 days for hospital follow- up.     Activity   Order Comments: Your activity upon discharge: activity as tolerated   Order Specific Question Answer Comments   Is discharge order? Yes      Full Code     Diet   Order Comments: Follow this diet upon discharge: Orders Placed This Encounter     Moderate Consistent CHO Diet   Order Specific Question Answer Comments   Is discharge order? Yes          # Discharge Pain Plan:   - During his hospitalization, Lance experienced pain due to left leg cellulitis.  The pain plan for discharge was discussed with Lance  and the plan was created in a collaborative fashion.    -Tylenol as needed      Recommendations:  -Clindamycin for 7 more days  -Follow-up with primary care doctor within the week for skin check    I, Vidal Newman, personally saw the patient today and spent greater than 30 minutes discharging this patient.    Vidal Newman MD

## 2018-05-04 NOTE — PLAN OF CARE
Problem: Skin and Soft Tissue Infection (Adult)  Goal: Signs and Symptoms of Listed Potential Problems Will be Absent, Minimized or Managed (Skin and Soft Tissue Infection)  Signs and symptoms of listed potential problems will be absent, minimized or managed by discharge/transition of care (reference Skin and Soft Tissue Infection (Adult) CPG).   Outcome: Improving  VSS. Pt denies pain. 0200 B. Red/adrianna LLE, reduced from markings. Trace-mild edema to LLE, pt denies N/T. Pt up with SBA, voiding fine. Plan for D/C home today.

## 2018-05-07 ENCOUNTER — TELEPHONE (OUTPATIENT)
Dept: INTERNAL MEDICINE | Facility: CLINIC | Age: 73
End: 2018-05-07

## 2018-05-07 LAB
BACTERIA SPEC CULT: NO GROWTH
BACTERIA SPEC CULT: NO GROWTH
Lab: NORMAL
Lab: NORMAL
SPECIMEN SOURCE: NORMAL
SPECIMEN SOURCE: NORMAL

## 2018-05-07 NOTE — TELEPHONE ENCOUNTER
IP F/U    Date: 05/04/18  Diagnosis: Generalized Muscle Weakness, Left Leg Cellulitis  Is patient active in care coordination? No  Was patient in TCU? No

## 2018-05-08 NOTE — TELEPHONE ENCOUNTER
"Hospital/TCU/ED for chronic condition Discharge Protocol    \"Hi, my name is Veena Rainey, a registered nurse, and I am calling from [facility].  I am calling to follow up and see how things are going for you after your recent emergency visit/hospital/TCU stay.\"    Tell me how you are doing now that you are home?\" Doing well at home, cellulitis continues to improve with oral antibiotics       Discharge Instructions    \"Let's review your discharge instructions.  What is/are the follow-up recommendations?  Pt. Response: Follow-up with PCP in 1 week    \"Has an appointment with your primary care provider been scheduled?\"   No (schedule appointment). Appointment scheduled with Anh on 5/14    \"When you see the provider, I would recommend that you bring your medications with you.\"    Medications    \"Tell me what changed about your medicines when you discharged?\"    Changes to chronic meds?    0-1    \"What questions do you have about your medications?\"    None     New diagnoses of heart failure, COPD, diabetes, or MI?    No              Medication reconciliation completed? No  Was MTM referral placed (*Make sure to put transitions as reason for referral)?   No    Call Summary    \"What questions or concerns do you have about your recent visit and your follow-up care?\"     none. Advised patient to call ASAP if cellulitis symptoms start to worsen.    \"If you have questions or things don't continue to improve, we encourage you contact us through the main clinic number (give number).  Even if the clinic is not open, triage nurses are available 24/7 to help you.     We would like you to know that our clinic has extended hours (provide information).  We also have urgent care (provide details on closest location and hours/contact info)\"      \"Thank you for your time and take care!\"             "

## 2018-05-12 ENCOUNTER — HEALTH MAINTENANCE LETTER (OUTPATIENT)
Age: 73
End: 2018-05-12

## 2018-05-14 ENCOUNTER — OFFICE VISIT (OUTPATIENT)
Dept: INTERNAL MEDICINE | Facility: CLINIC | Age: 73
End: 2018-05-14
Payer: COMMERCIAL

## 2018-05-14 VITALS
WEIGHT: 258.5 LBS | SYSTOLIC BLOOD PRESSURE: 104 MMHG | HEIGHT: 72 IN | OXYGEN SATURATION: 97 % | HEART RATE: 122 BPM | RESPIRATION RATE: 16 BRPM | BODY MASS INDEX: 35.01 KG/M2 | TEMPERATURE: 98.2 F | DIASTOLIC BLOOD PRESSURE: 70 MMHG

## 2018-05-14 DIAGNOSIS — A41.9 SEPSIS, DUE TO UNSPECIFIED ORGANISM: Primary | ICD-10-CM

## 2018-05-14 PROCEDURE — 99495 TRANSJ CARE MGMT MOD F2F 14D: CPT | Performed by: NURSE PRACTITIONER

## 2018-05-14 NOTE — PROGRESS NOTES
SUBJECTIVE:   Lance Ibrahim is a 73 year old male who presents to clinic today for the following health issues:          Hospital Follow-up Visit:    Hospital/Nursing Home/IP Rehab Facility: [facility]  Date of Admission: 05/01/18  Date of Discharge: 05/04/15  Reason(s) for Admission: cellulitis left foot/ankle             Problems taking medications regularly:  None       Medication changes since discharge: None       Problems adhering to non-medication therapy:  None    Summary of hospitalization:  [facility] discharge summary reviewed  Diagnostic Tests/Treatments reviewed.  Follow up needed: none  Other Healthcare Providers Involved in Patient s Care:         None  Update since discharge: improved.     Post Discharge Medication Reconciliation: discharge medications reconciled, continue medications without change.  Plan of care communicated with patient     Coding guidelines for this visit:  Type of Medical   Decision Making Face-to-Face Visit       within 7 Days of discharge Face-to-Face Visit        within 14 days of discharge   Moderate Complexity [id number]   High Complexity [id number]              Patient Active Problem List   Diagnosis     Ventral hernia     Chronic rhinitis     Hypertrophy of prostate with urinary obstruction     Transient cerebral ischemia     HTN (hypertension)     CARDIOVASCULAR SCREENING; LDL GOAL LESS THAN 100     Advanced directives, counseling/discussion     Gout     Type 2 diabetes, HbA1c goal < 7% (H)     Obesity     Diabetes mellitus type 2, uncomplicated (H)     Benign essential hypertension     Cervicalgia     Sepsis (H)     Past Surgical History:   Procedure Laterality Date     C NONSPECIFIC PROCEDURE  1985    Diastasis recti repair     C NONSPECIFIC PROCEDURE  1987    Ventral hernia repair     C NONSPECIFIC PROCEDURE      ORIF of left shoulder     COLONOSCOPY  3/9/2013    Procedure: COLONOSCOPY;  COLONOSCOPY;  Surgeon: Chau Hogan MD;  Location:   GI     EYE SURGERY  2017    left eye     FOOT SURGERY  2013    cyst removal      HERNIA REPAIR  2004    ventral        Social History   Substance Use Topics     Smoking status: Former Smoker     Quit date: 3/17/1993     Smokeless tobacco: Never Used     Alcohol use Yes      Comment: Occ, Once a month     Family History   Problem Relation Age of Onset     Family History Negative Mother       88 yo     CANCER Father       74 yo brain     DIABETES Maternal Grandfather       91 yo     Alcohol/Drug Paternal Grandfather               Current Outpatient Prescriptions   Medication Sig Dispense Refill     aspirin 325 MG tablet Take 325 mg by mouth daily        ferrous gluconate (FERGON) 324 (38 FE) MG tablet Take 1 tablet by mouth daily.       finasteride (PROSCAR) 5 MG tablet Take 5 mg by mouth daily.       fluticasone (FLONASE) 50 MCG/ACT spray SPRAY 1-2 SPRAYS INTO BOTH NOSTRILS DAILY 48 g 1     lisinopril (PRINIVIL/ZESTRIL) 10 MG tablet TAKE 2 TABLETS (20 MG) BY MOUTH DAILY 180 tablet 3     loratadine (CLARITIN) 10 MG tablet Take 10 mg by mouth At Bedtime       metFORMIN (GLUCOPHAGE) 1000 MG tablet TAKE 1 TABLET (1,000 MG) BY MOUTH 2 TIMES DAILY (WITH MEALS) 180 tablet 1     Multiple Vitamins-Minerals (CENTRUM SILVER) per tablet Take 1 tablet by mouth daily.       naproxen sodium (ALEVE) 220 MG capsule Take 220 mg by mouth as needed.       propylene glycol (SYSTANE BALANCE) 0.6 % SOLN Place 1 drop into both eyes every morning        tamsulosin (FLOMAX) 0.4 MG 24 hr capsule Take 1 capsule by mouth daily.       BP Readings from Last 3 Encounters:   18 104/70   18 132/63   02/15/18 136/70    Wt Readings from Last 3 Encounters:   18 258 lb 8 oz (117.3 kg)   18 262 lb 12.8 oz (119.2 kg)   02/15/18 266 lb 9.6 oz (120.9 kg)                    Reviewed and updated as needed this visit by clinical staff  Tobacco  Allergies  Meds  Med Hx  Surg Hx  Fam Hx   Soc Hx      Reviewed and updated as needed this visit by Provider         ROS:  CONSTITUTIONAL: NEGATIVE for fever, chills, change in weight  ENT/MOUTH: NEGATIVE for ear, mouth and throat problems  RESP: NEGATIVE for significant cough or SOB  CV: NEGATIVE for chest pain, palpitations or peripheral edema  MUSCULOSKELETAL: NEGATIVE for significant arthralgias or myalgia    OBJECTIVE:     /70 (BP Location: Right arm, Patient Position: Sitting, Cuff Size: Adult Large)  Pulse 122  Temp 98.2  F (36.8  C) (Oral)  Resp 16  Ht 6' (1.829 m)  Wt 258 lb 8 oz (117.3 kg)  SpO2 97%  BMI 35.06 kg/m2  Body mass index is 35.06 kg/(m^2).  GENERAL: healthy, alert and no distress  MS: LLE mocerate venous stasis discoloration, 1+ pitting edema        ASSESSMENT/PLAN:               ICD-10-CM    1. Sepsis, due to unspecified organism (H) A41.9        Improved.  Monitor LLE, temp  RTC prn    Anh Spivey NP  [facility]

## 2018-05-14 NOTE — MR AVS SNAPSHOT
"              After Visit Summary   2018    Lance Ibrahim    MRN: [id number]           Patient Information     Date Of Birth          1945        Visit Information        Provider Department      2018 3:40 PM Anh Spivey NP [facility]        Today's Diagnoses     Sepsis, due to unspecified organism (H)    -  1       Follow-ups after your visit        Follow-up notes from your care team     Return if symptoms worsen or fail to improve.      Who to contact     If you have questions or need follow up information about today's clinic visit or your schedule please contact [facility] directly at [phone].  Normal or non-critical lab and imaging results will be communicated to you by Pre[employer], letter or phone within 4 business days after the clinic has received the results. If you do not hear from us within 7 days, please contact the clinic through Preclick[employer] or phone. If you have a critical or abnormal lab result, we will notify you by phone as soon as possible.  Submit refill requests through [employer] or call your pharmacy and they will forward the refill request to us. Please allow 3 business days for your refill to be completed.          Additional Information About Your Visit        MyChart Information     [employer] lets you send messages to your doctor, view your test results, renew your prescriptions, schedule appointments and more. To sign up, go to [email or web address] . Click on \"Log in\" on the left side of the screen, which will take you to the Welcome page. Then click on \"Sign up Now\" on the right side of the page.     You will be asked to enter the access code listed below, as well as some personal information. Please follow the directions to create your username and password.     Your access code is: [email or web address]  Expires: 2018 10:04 AM     Your access code will  in 90 days. If you need help or a new code, please call your [facility] or " [phone].        Care EveryWhere ID     This is your Care EveryWhere ID. This could be used by other organizations to access your [facility] medical records  [id number]        Your Vitals Were     Pulse Temperature Respirations Height Pulse Oximetry BMI (Body Mass Index)    122 98.2  F (36.8  C) (Oral) 16 6' (1.829 m) 97% 35.06 kg/m2       Blood Pressure from Last 3 Encounters:   05/14/18 104/70   05/04/18 132/63   02/15/18 136/70    Weight from Last 3 Encounters:   05/14/18 258 lb 8 oz (117.3 kg)   05/01/18 262 lb 12.8 oz (119.2 kg)   02/15/18 266 lb 9.6 oz (120.9 kg)              Today, you had the following     No orders found for display       Primary Care Provider Office Phone # Fax #    Anh Spivey, ELANA [phone]       [address] [facility] [address]        Equal Access to Services     [facility]ASSAN [facility] Center[facility]RISTI : Hadii aad ku hadasho Soomaali, waaxda luqadaha, qaybta kaalmada adeegyada, waxay idiin haymattyn donnell eliarajose ramon mayes . So [facility] [phone].    ATENCIÓN: Si habla español, tiene a [facility] disposición servicios gratuitos de asistencia lingüística. Llame al [phone].    We comply with applicable federal civil rights laws and [place] laws. We do not discriminate on the basis of race, color, national origin, age, disability, sex, sexual orientation, or gender identity.            Thank you!     Thank you for choosing [facility]  for your care. Our goal is always to provide you with excellent care. Hearing back from our patients is one way we can continue to improve our services. Please take a few minutes to complete the written survey that you may receive in the mail after your visit with us. Thank you!             Your Updated Medication List - Protect others around you: Learn how to safely use, store and throw away your medicines at www.disposemymeds.org.          This list is accurate as of 5/14/18  4:11 PM.  Always use your most recent med list.                    Brand Name Dispense Instructions for use Diagnosis    ALEVE 220 MG capsule   Generic drug:  naproxen sodium      Take 220 mg by mouth as needed.        aspirin 325 MG tablet      Take 325 mg by mouth daily        CENTRUM SILVER per tablet      Take 1 tablet by mouth daily.        ferrous gluconate 324 (38 Fe) MG tablet    FERGON     Take 1 tablet by mouth daily.        finasteride 5 MG tablet    PROSCAR     Take 5 mg by mouth daily.        fluticasone 50 MCG/ACT spray    FLONASE    48 g    SPRAY 1-2 SPRAYS INTO BOTH NOSTRILS DAILY    Chronic rhinitis, unspecified type       lisinopril 10 MG tablet    PRINIVIL/ZESTRIL    180 tablet    TAKE 2 TABLETS (20 MG) BY MOUTH DAILY    Essential hypertension       loratadine 10 MG tablet    CLARITIN     Take 10 mg by mouth At Bedtime        metFORMIN 1000 MG tablet    GLUCOPHAGE    180 tablet    TAKE 1 TABLET (1,000 MG) BY MOUTH 2 TIMES DAILY (WITH MEALS)    Type 2 diabetes mellitus without complication, without long-term current use of insulin (H)       SYSTANE BALANCE 0.6 % Soln ophthalmic solution   Generic drug:  propylene glycol      Place 1 drop into both eyes every morning        tamsulosin 0.4 MG capsule    FLOMAX     Take 1 capsule by mouth daily.

## 2018-05-14 NOTE — NURSING NOTE
Patient here for hospital follow up, [facility] 05/01/18-05/04/18 left foot/anlke cellulitis.  BAYRON PUGH,CMA

## 2018-05-15 ENCOUNTER — TELEPHONE (OUTPATIENT)
Dept: INTERNAL MEDICINE | Facility: CLINIC | Age: 73
End: 2018-05-15

## 2018-05-15 NOTE — TELEPHONE ENCOUNTER
Panel Management Review      Patient has the following on his problem list:     Diabetes    ASA: Passed    Last A1C  Lab Results   Component Value Date    A1C 6.7 05/01/2018    A1C 6.3 10/07/2017    A1C 6.3 05/15/2017    A1C 5.6 10/12/2016    A1C 6.1 03/05/2016     A1C tested: FAILED    Last LDL:    Lab Results   Component Value Date    CHOL 151 10/07/2017     Lab Results   Component Value Date    HDL 65 10/07/2017     Lab Results   Component Value Date    LDL 72 10/07/2017     Lab Results   Component Value Date    TRIG 68 10/07/2017     Lab Results   Component Value Date    CHOLHDLRATIO 2.5 02/14/2015     Lab Results   Component Value Date    NHDL 86 10/07/2017       Is the patient on a Statin? NO             Is the patient on Aspirin? YES    Medications     Salicylates    aspirin 325 MG tablet          Last three blood pressure readings:  BP Readings from Last 3 Encounters:   05/14/18 104/70   05/04/18 132/63   02/15/18 136/70       Date of last diabetes office visit: 02/15/18     Tobacco History:     History   Smoking Status     Former Smoker     Quit date: 3/17/1993   Smokeless Tobacco     Never Used         Hypertension   Last three blood pressure readings:  BP Readings from Last 3 Encounters:   05/14/18 104/70   05/04/18 132/63   02/15/18 136/70     Blood pressure: Passed    HTN Guidelines:  Age 18-59 BP range:  Less than 140/90  Age 60-85 with Diabetes:  Less than 140/90  Age 60-85 without Diabetes:  less than 150/90      Composite cancer screening  Chart review shows that this patient is due/due soon for the following None  Summary:    Patient is due/failing the following:   A1C    Action needed:   Patient needs office visit for Diabetes check.    Type of outreach:    Phone, spoke to patient.  patient states that he usually is a recheck every 6 months, checked with provider and she ok'ed for every 6 months.    Questions for provider review:    None                                                                                                                                     BAYRON PUGH,[facility]       Chart routed to no one .

## 2018-05-31 DIAGNOSIS — I10 ESSENTIAL HYPERTENSION: ICD-10-CM

## 2018-06-05 RX ORDER — LISINOPRIL 10 MG/1
TABLET ORAL
Qty: 180 TABLET | Refills: 3 | Status: SHIPPED | OUTPATIENT
Start: 2018-06-05 | End: 2018-10-30

## 2018-06-05 NOTE — TELEPHONE ENCOUNTER
"Requested Prescriptions   Pending Prescriptions Disp Refills     lisinopril (PRINIVIL/ZESTRIL) 10 MG tablet [Pharmacy Med Name: LISINOPRIL 10 MG TABLET] 180 tablet 3     Sig: TAKE 2 TABLETS (20 MG) BY MOUTH DAILY    ACE Inhibitors (Including Combos) Protocol Passed    5/31/2018  6:44 AM       Passed - Blood pressure under 140/90 in past 12 months    BP Readings from Last 3 Encounters:   05/14/18 104/70   05/04/18 132/63   02/15/18 136/70                Passed - Recent (12 mo) or future (30 days) visit within the authorizing provider's specialty    Patient had office visit in the last 12 months or has a visit in the next 30 days with authorizing provider or within the authorizing provider's specialty.  See \"Patient Info\" tab in inbasket, or \"Choose Columns\" in Meds & Orders section of the refill encounter.           Passed - Patient is age 18 or older       Passed - Normal serum creatinine on file in past 12 months    Recent Labs   Lab Test  05/04/18   0749   CR  0.95            Passed - Normal serum potassium on file in past 12 months    Recent Labs   Lab Test  05/03/18   0643   POTASSIUM  4.3             Last office visit 5/14/18. Prescription approved per [facility] Refill Protocol.  Brenda Beck RN     "

## 2018-06-19 ENCOUNTER — APPOINTMENT (OUTPATIENT)
Dept: GENERAL RADIOLOGY | Facility: CLINIC | Age: 73
DRG: 572 | End: 2018-06-19
Attending: EMERGENCY MEDICINE
Payer: COMMERCIAL

## 2018-06-19 ENCOUNTER — HOSPITAL ENCOUNTER (INPATIENT)
Facility: CLINIC | Age: 73
LOS: 2 days | Discharge: HOME | DRG: 572 | End: 2018-06-21
Attending: EMERGENCY MEDICINE | Admitting: INTERNAL MEDICINE
Payer: COMMERCIAL

## 2018-06-19 DIAGNOSIS — L03.116 LEFT LEG CELLULITIS: ICD-10-CM

## 2018-06-19 PROBLEM — L03.90 CELLULITIS: Status: ACTIVE | Noted: 2018-06-19

## 2018-06-19 LAB
ANION GAP SERPL CALCULATED.3IONS-SCNC: 8 MMOL/L (ref 3–14)
BASOPHILS # BLD AUTO: 0 10E9/L (ref 0–0.2)
BASOPHILS NFR BLD AUTO: 0.4 %
BUN SERPL-MCNC: 19 MG/DL (ref 7–30)
CALCIUM SERPL-MCNC: 9.1 MG/DL (ref 8.5–10.1)
CHLORIDE SERPL-SCNC: 104 MMOL/L (ref 94–109)
CO2 SERPL-SCNC: 28 MMOL/L (ref 20–32)
CREAT SERPL-MCNC: 1.03 MG/DL (ref 0.66–1.25)
CREAT SERPL-MCNC: 1.13 MG/DL (ref 0.66–1.25)
CRP SERPL-MCNC: 102 MG/L (ref 0–8)
DIFFERENTIAL METHOD BLD: ABNORMAL
EOSINOPHIL # BLD AUTO: 0.1 10E9/L (ref 0–0.7)
EOSINOPHIL NFR BLD AUTO: 0.7 %
ERYTHROCYTE [DISTWIDTH] IN BLOOD BY AUTOMATED COUNT: 17.1 % (ref 10–15)
GFR SERPL CREATININE-BSD FRML MDRD: 63 ML/MIN/1.7M2
GFR SERPL CREATININE-BSD FRML MDRD: 71 ML/MIN/1.7M2
GLUCOSE BLDC GLUCOMTR-MCNC: 158 MG/DL (ref 70–99)
GLUCOSE SERPL-MCNC: 180 MG/DL (ref 70–99)
HCT VFR BLD AUTO: 37.9 % (ref 40–53)
HGB BLD-MCNC: 12 G/DL (ref 13.3–17.7)
IMM GRANULOCYTES # BLD: 0.1 10E9/L (ref 0–0.4)
IMM GRANULOCYTES NFR BLD: 1.3 %
LACTATE BLD-SCNC: 1.2 MMOL/L (ref 0.7–2)
LACTATE BLD-SCNC: 2.3 MMOL/L (ref 0.7–2)
LYMPHOCYTES # BLD AUTO: 1.8 10E9/L (ref 0.8–5.3)
LYMPHOCYTES NFR BLD AUTO: 16.6 %
MCH RBC QN AUTO: 29.7 PG (ref 26.5–33)
MCHC RBC AUTO-ENTMCNC: 31.7 G/DL (ref 31.5–36.5)
MCV RBC AUTO: 94 FL (ref 78–100)
MONOCYTES # BLD AUTO: 0.8 10E9/L (ref 0–1.3)
MONOCYTES NFR BLD AUTO: 7.3 %
NEUTROPHILS # BLD AUTO: 7.8 10E9/L (ref 1.6–8.3)
NEUTROPHILS NFR BLD AUTO: 73.7 %
NRBC # BLD AUTO: 0 10*3/UL
NRBC BLD AUTO-RTO: 0 /100
PLATELET # BLD AUTO: 216 10E9/L (ref 150–450)
PLATELET # BLD AUTO: 265 10E9/L (ref 150–450)
POTASSIUM SERPL-SCNC: 3.6 MMOL/L (ref 3.4–5.3)
RBC # BLD AUTO: 4.04 10E12/L (ref 4.4–5.9)
SODIUM SERPL-SCNC: 140 MMOL/L (ref 133–144)
WBC # BLD AUTO: 10.6 10E9/L (ref 4–11)

## 2018-06-19 PROCEDURE — 82565 ASSAY OF CREATININE: CPT | Performed by: INTERNAL MEDICINE

## 2018-06-19 PROCEDURE — 99222 1ST HOSP IP/OBS MODERATE 55: CPT | Mod: AI | Performed by: INTERNAL MEDICINE

## 2018-06-19 PROCEDURE — 96365 THER/PROPH/DIAG IV INF INIT: CPT

## 2018-06-19 PROCEDURE — 12000000 ZZH R&B MED SURG/OB

## 2018-06-19 PROCEDURE — 85049 AUTOMATED PLATELET COUNT: CPT | Performed by: INTERNAL MEDICINE

## 2018-06-19 PROCEDURE — 85025 COMPLETE CBC W/AUTO DIFF WBC: CPT | Performed by: EMERGENCY MEDICINE

## 2018-06-19 PROCEDURE — 73630 X-RAY EXAM OF FOOT: CPT | Mod: LT

## 2018-06-19 PROCEDURE — 25000128 H RX IP 250 OP 636: Performed by: EMERGENCY MEDICINE

## 2018-06-19 PROCEDURE — 36415 COLL VENOUS BLD VENIPUNCTURE: CPT | Performed by: EMERGENCY MEDICINE

## 2018-06-19 PROCEDURE — 36415 COLL VENOUS BLD VENIPUNCTURE: CPT | Performed by: INTERNAL MEDICINE

## 2018-06-19 PROCEDURE — 83605 ASSAY OF LACTIC ACID: CPT | Performed by: EMERGENCY MEDICINE

## 2018-06-19 PROCEDURE — 87040 BLOOD CULTURE FOR BACTERIA: CPT | Performed by: EMERGENCY MEDICINE

## 2018-06-19 PROCEDURE — 96375 TX/PRO/DX INJ NEW DRUG ADDON: CPT

## 2018-06-19 PROCEDURE — 80048 BASIC METABOLIC PNL TOTAL CA: CPT | Performed by: EMERGENCY MEDICINE

## 2018-06-19 PROCEDURE — 86140 C-REACTIVE PROTEIN: CPT | Performed by: EMERGENCY MEDICINE

## 2018-06-19 PROCEDURE — 25000128 H RX IP 250 OP 636: Performed by: INTERNAL MEDICINE

## 2018-06-19 PROCEDURE — 25000132 ZZH RX MED GY IP 250 OP 250 PS 637: Performed by: INTERNAL MEDICINE

## 2018-06-19 PROCEDURE — 00000146 ZZHCL STATISTIC GLUCOSE BY METER IP

## 2018-06-19 PROCEDURE — 96361 HYDRATE IV INFUSION ADD-ON: CPT

## 2018-06-19 PROCEDURE — 99285 EMERGENCY DEPT VISIT HI MDM: CPT

## 2018-06-19 RX ORDER — ONDANSETRON 4 MG/1
4 TABLET, ORALLY DISINTEGRATING ORAL EVERY 6 HOURS PRN
Status: DISCONTINUED | OUTPATIENT
Start: 2018-06-19 | End: 2018-06-21 | Stop reason: HOSPADM

## 2018-06-19 RX ORDER — FINASTERIDE 5 MG/1
5 TABLET, FILM COATED ORAL DAILY
Status: DISCONTINUED | OUTPATIENT
Start: 2018-06-20 | End: 2018-06-21 | Stop reason: HOSPADM

## 2018-06-19 RX ORDER — ACETAMINOPHEN 325 MG/1
650 TABLET ORAL EVERY 4 HOURS PRN
Status: DISCONTINUED | OUTPATIENT
Start: 2018-06-19 | End: 2018-06-21 | Stop reason: HOSPADM

## 2018-06-19 RX ORDER — DEXTROSE MONOHYDRATE 25 G/50ML
25-50 INJECTION, SOLUTION INTRAVENOUS
Status: DISCONTINUED | OUTPATIENT
Start: 2018-06-19 | End: 2018-06-21 | Stop reason: HOSPADM

## 2018-06-19 RX ORDER — ONDANSETRON 2 MG/ML
4 INJECTION INTRAMUSCULAR; INTRAVENOUS EVERY 6 HOURS PRN
Status: DISCONTINUED | OUTPATIENT
Start: 2018-06-19 | End: 2018-06-21 | Stop reason: HOSPADM

## 2018-06-19 RX ORDER — CLINDAMYCIN PHOSPHATE 600 MG/50ML
600 INJECTION, SOLUTION INTRAVENOUS EVERY 8 HOURS
Status: DISCONTINUED | OUTPATIENT
Start: 2018-06-20 | End: 2018-06-21 | Stop reason: HOSPADM

## 2018-06-19 RX ORDER — TAMSULOSIN HYDROCHLORIDE 0.4 MG/1
0.4 CAPSULE ORAL DAILY
Status: DISCONTINUED | OUTPATIENT
Start: 2018-06-20 | End: 2018-06-21 | Stop reason: HOSPADM

## 2018-06-19 RX ORDER — AMOXICILLIN 250 MG
2 CAPSULE ORAL 2 TIMES DAILY PRN
Status: DISCONTINUED | OUTPATIENT
Start: 2018-06-19 | End: 2018-06-21 | Stop reason: HOSPADM

## 2018-06-19 RX ORDER — CEFTRIAXONE 2 G/1
2 INJECTION, POWDER, FOR SOLUTION INTRAMUSCULAR; INTRAVENOUS ONCE
Status: DISCONTINUED | OUTPATIENT
Start: 2018-06-19 | End: 2018-06-19

## 2018-06-19 RX ORDER — OXYCODONE HYDROCHLORIDE 5 MG/1
5-10 TABLET ORAL
Status: DISCONTINUED | OUTPATIENT
Start: 2018-06-19 | End: 2018-06-21 | Stop reason: HOSPADM

## 2018-06-19 RX ORDER — LORATADINE 10 MG/1
10 TABLET ORAL AT BEDTIME
Status: DISCONTINUED | OUTPATIENT
Start: 2018-06-19 | End: 2018-06-21 | Stop reason: HOSPADM

## 2018-06-19 RX ORDER — LISINOPRIL 20 MG/1
20 TABLET ORAL DAILY
Status: DISCONTINUED | OUTPATIENT
Start: 2018-06-20 | End: 2018-06-21 | Stop reason: HOSPADM

## 2018-06-19 RX ORDER — FLUTICASONE PROPIONATE 50 MCG
1 SPRAY, SUSPENSION (ML) NASAL AT BEDTIME
Status: DISCONTINUED | OUTPATIENT
Start: 2018-06-19 | End: 2018-06-21 | Stop reason: HOSPADM

## 2018-06-19 RX ORDER — AMOXICILLIN 250 MG
1 CAPSULE ORAL 2 TIMES DAILY PRN
Status: DISCONTINUED | OUTPATIENT
Start: 2018-06-19 | End: 2018-06-21 | Stop reason: HOSPADM

## 2018-06-19 RX ORDER — CEFTRIAXONE 2 G/1
2 INJECTION, POWDER, FOR SOLUTION INTRAMUSCULAR; INTRAVENOUS ONCE
Status: COMPLETED | OUTPATIENT
Start: 2018-06-19 | End: 2018-06-19

## 2018-06-19 RX ORDER — NALOXONE HYDROCHLORIDE 0.4 MG/ML
.1-.4 INJECTION, SOLUTION INTRAMUSCULAR; INTRAVENOUS; SUBCUTANEOUS
Status: DISCONTINUED | OUTPATIENT
Start: 2018-06-19 | End: 2018-06-21 | Stop reason: HOSPADM

## 2018-06-19 RX ORDER — NICOTINE POLACRILEX 4 MG
15-30 LOZENGE BUCCAL
Status: DISCONTINUED | OUTPATIENT
Start: 2018-06-19 | End: 2018-06-21 | Stop reason: HOSPADM

## 2018-06-19 RX ADMIN — FLUTICASONE PROPIONATE 1 SPRAY: 50 SPRAY, METERED NASAL at 21:16

## 2018-06-19 RX ADMIN — ENOXAPARIN SODIUM 40 MG: 40 INJECTION SUBCUTANEOUS at 20:41

## 2018-06-19 RX ADMIN — METFORMIN HYDROCHLORIDE 1000 MG: 500 TABLET ORAL at 20:41

## 2018-06-19 RX ADMIN — LORATADINE 10 MG: 10 TABLET ORAL at 21:17

## 2018-06-19 RX ADMIN — VANCOMYCIN HYDROCHLORIDE 2750 MG: 1 INJECTION, POWDER, LYOPHILIZED, FOR SOLUTION INTRAVENOUS at 18:24

## 2018-06-19 RX ADMIN — SODIUM CHLORIDE 1000 ML: 9 INJECTION, SOLUTION INTRAVENOUS at 14:50

## 2018-06-19 RX ADMIN — CEFTRIAXONE SODIUM 2 G: 2 INJECTION, POWDER, FOR SOLUTION INTRAMUSCULAR; INTRAVENOUS at 17:47

## 2018-06-19 ASSESSMENT — ENCOUNTER SYMPTOMS
MYALGIAS: 0
WOUND: 1
ARTHRALGIAS: 0
FEVER: 0
COLOR CHANGE: 1
SHORTNESS OF BREATH: 0
NUMBNESS: 0

## 2018-06-19 ASSESSMENT — PAIN DESCRIPTION - DESCRIPTORS: DESCRIPTORS: BURNING

## 2018-06-19 NOTE — ED PROVIDER NOTES
History     Chief Complaint:  Wound Infection    HPI   Lance Ibrahim is a 73 year old male who presents to the emergency department today for evaluation of a wound infection. Per chart review, the patient was seen in the ED on 5/1 for elevated heart rate detected at his clinic that day.  Imaging and labs were obtained as noted below were significant for an elevated lactic acid and leukocytosis to 21.  He was found to have severe sepsis and was admitted to the hospital as hospitalist noted that he had cellulitis to his left lower extremity.  During his hospital stay, he had IV antibiotics including clindamycin. Throughout his stay, his leukocytosis, lactic acid, and erythema and swelling improved and he was discharged on oral clindamycin. Since discharge, the patient notes that he has been taking his oral antibiotics. But, he believes the cellulitis is back as he has worsening redness, warmth, and erythema to the left foot greater toe. He states that he removed a callous on the area two weeks ago. He was concerned about recurrent cellulitis prompting his visit to the emergency department. He denies fever, pain, numbness, chest pain, shortness of breath, and leg swelling.    Imaging (5/1/18):  X-ray Chest, 2 views:  No radiographic evidence of acute chest abnormality.   Result per radiology.      CT-scan Head w/o contrast:  Diffuse cerebral volume loss and cerebral white matter  changes consistent with chronic small vessel ischemic disease. No  evidence for acute intracranial pathology.   Result per radiology.       Laboratory (5/1/18):  1342 -Troponin POCT: 0.00  CBC: WBC 21.1 (H), HGB 13.1 (L), o/w WNL ()    CMP: Glucose 106 (H), Bilirubin total 0.1 (L), Albumin 3.2 (L), o/w WNL (Creatinine 0.93)   Magnesium: 1.8  TSH: 2.19  1548 - ISTAT Lactate: pH 7.37, pCO2 40, pO2 22 (L), Bicarbonate 24, Lactic Acid 3.4 (H)  1620 - ISTAT Lactate: pH 7.43, pCO2 38 (L), pO2 20 (L), Bicarbonate 25, Lactic Acid 2.6 (H)        UA: Mucous present, o/w negative  Influenza: Negative  Rapid Strep: Negative     Allergies:  Penicillins  Sulfa Drugs    Medications:    aspirin 325 MG tablet  lisinopril (PRINIVIL/ZESTRIL) 10 MG tablet  metFORMIN (GLUCOPHAGE) 1000 MG tablet  ferrous gluconate (FERGON) 324 (38 FE) MG tablet  finasteride (PROSCAR) 5 MG tablet  loratadine (CLARITIN) 10 MG tablet  Multiple Vitamins-Minerals (CENTRUM SILVER) per tablet  naproxen sodium (ALEVE) 220 MG capsule  tamsulosin (FLOMAX) 0.4 MG 24 hr capsule    Past Medical History:    Chronic rhinitis  Diabetes mellitus  Hypertension  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  Smoking Status: Former  Smokeless Tobacco: Never  Alcohol Use: Yes  Marital Status:       Review of Systems   Constitutional: Negative for fever.   Respiratory: Negative for shortness of breath.    Cardiovascular: Negative for chest pain and leg swelling.   Musculoskeletal: Negative for arthralgias and myalgias.   Skin: Positive for color change and wound.   Neurological: Negative for numbness.   All other systems reviewed and are negative.    Physical Exam     Patient Vitals for the past 24 hrs:   BP Temp Temp src Heart Rate Resp SpO2 Height Weight   06/19/18 1723 - - - - - 97 % - -   06/19/18 1630 - - - - - 94 % - -   06/19/18 1600 134/77 - - - - (!) 89 % - -   06/19/18 1558 - - - - - 91 % - -   06/19/18 1554 134/77 - - - - - - -   06/19/18 1430 159/82 - - - - 100 % - -   06/19/18 1415 144/71 - - - - - - -   06/19/18 1344 161/89 98.9  F (37.2  C) Oral 113 20 98 % 1.829 m (6') 116.6 kg (257 lb)     Physical Exam  Nursing note and vitals  reviewed.  Constitutional: Cooperative.   HENT:   Mouth/Throat: Moist mucous membranes.   Eyes: EOMI, nonicteric sclera  Cardiovascular: tachycardic, regular rhythm, no murmurs, rubs, or gallops  Pulmonary/Chest: Effort normal and breath sounds normal. No respiratory distress. No wheezes. No rales.   Abdominal: Soft. Nontender, nondistended, no guarding or rigidity. BS present.   Musculoskeletal: Normal range of motion. LLE erythematous and warm with skin breakdown over 1st MTP with blister superiorly with purulent drainage.   Neurological: Alert. Moves all extremities spontaneously.   Skin: Skin is warm and dry. No rash noted.   Psychiatric: Normal mood and affect.       Emergency Department Course   Imaging:  Radiology findings were communicated with the patient who voiced understanding of the findings.  Foot XR, G/E 3 views, left  IMPRESSION: Bunionectomy changes at the first metatarsal head. Soft  tissue swelling overlying the first metatarsophalangeal joint, but no  definite erosive changes are seen. No fractures identified in the left  foot. Plantar calcaneal spur is seen.  Report per radiology     Laboratory:  Laboratory findings were communicated with the patient who voiced understanding of the findings.  CBC: HGB 12.0 (L) o/w WNL. (WBC 10.6, )   BMP: glucose 180 (H) o/w WNL (Creatinine 1.13)  Lactic Acid (collected 1445): 2.3 (H)  CRP inflammation: 102 (H)    Blood cultures: Pending      Interventions:  Medications   cefTRIAXone (ROCEPHIN) 2 g vial to attach to  ml bag for ADULTS or NS 50 ml bag for PEDS (2 g Intravenous New Bag 6/19/18 1747)   vancomycin (VANCOCIN) 2,750 mg in sodium chloride 0.9 % 500 mL intermittent infusion (not administered)   0.9% sodium chloride BOLUS (0 mLs Intravenous Stopped 6/19/18 1741)        Emergency Department Course:  Nursing notes and vitals reviewed.  IV was inserted and blood was drawn for laboratory testing, results above.  The patient was sent for a Foot  XR, G/E 3 views, left while in the emergency department, results above.   1431: I performed an exam of the patient as documented above.   1634: I spoke with ED pharmacist regarding antibiotic use.   1721: I spoke with Dr. Dill of the hospitalist service regarding patient's presentation, findings, and plan of care.  Findings and plan explained to the Patient who consents to admission. Discussed the patient with Dr. Dill, who will admit the patient to a medical bed for further monitoring, evaluation, and treatment.  I personally reviewed the laboratory and imaging results with the Patient and answered all related questions prior to admission.    Impression & Plan    CMS Diagnoses:   The patient has signs of Severe Sepsis as evidenced by:    1. 2 SIRS criteria, AND  2. Suspected infection, AND   3. Organ dysfunction: Lactic Acid > 1.9    Time severe sepsis diagnosis confirmed = 1445 as this was the time when Lactate resulted, and the level was > 1.9      3 Hour Severe Sepsis Bundle Completion:  1. Initial Lactic Acid Result:   Recent Labs   Lab Test  06/19/18   1445  05/03/18   0643  05/01/18   1720   LACT  2.3*  1.3  2.6*     2. Blood Cultures before Antibiotics: Yes  3. Broad Spectrum Antibiotics Administered: Yes     Anti-infectives (Future)    Start     Dose/Rate Route Frequency Ordered Stop    06/19/18 1727  vancomycin (VANCOCIN) 2,750 mg in sodium chloride 0.9 % 500 mL intermittent infusion      2,750 mg  over 2 Hours Intravenous ONCE 06/19/18 1726      06/19/18 1725  cefTRIAXone (ROCEPHIN) 2 g vial to attach to  ml bag for ADULTS or NS 50 ml bag for PEDS      2 g  over 30 Minutes Intravenous ONCE 06/19/18 1724          4. 1000 ml of IV fluids.  Ideal body weight: 77.6 kg (171 lb 1.2 oz)  Adjusted ideal body weight: 93.2 kg (205 lb 7.1 oz)    Severe Sepsis reassessment:  1. Repeat Lactic Acid Level: 1.2  2. MAP>65 after initial IVF bolus, will continue to monitor fluid status and vital signs                Medical Decision Making:  Pt presents with LLE cellulitis. Pt has signs of severe sepsis. He is immunosuppressed given DM2. Broad antibiotics administered. Initially was going to cover for pseudomonas with cefepime (PCN allergy), but pt's old culture grew strep, and he doesn't have risk factors for pseudomonas. Will do vanc/rocephin. (Pt tolerated keflex in past.) Dr. Dill of hospitalist service accepts. He's admitted in stable condition.     Diagnosis:    ICD-10-CM    1. Left leg cellulitis L03.116 Blood culture     Lactic acid whole blood       Disposition:  Admitted to medical bed with Dr. Dill.    Scribe Disclosure:  Mohan PHILLIPS, am serving as a scribe at 2:01 PM on 6/19/2018 to document services personally performed by Otilio Valderrama MD based on my observations and the provider's statements to me.     6/19/2018   [facility] EMERGENCY DEPARTMENT       Otilio Valderrama MD  06/19/18 8276

## 2018-06-19 NOTE — IP AVS SNAPSHOT
[place] [address] [facility]    [address]    [facility] [address]    Phone:  [phone]    Fax:  [phone]                                       After Visit Summary   6/19/2018    Lance Ibrahim    MRN: [id number]           After Visit Summary Signature Page     I have received my discharge instructions, and my questions have been answered. I have discussed any challenges I see with this plan with the nurse or doctor.    ..........................................................................................................................................  Patient/Patient Representative Signature      ..........................................................................................................................................  Patient Representative Print Name and Relationship to Patient    ..................................................               ................................................  Date                                            Time    ..........................................................................................................................................  Reviewed by Signature/Title    ...................................................              ..............................................  Date                                                            Time

## 2018-06-19 NOTE — ED TRIAGE NOTES
Pt was admitted 5 weeks ago with cellulitis of left foot.  He developed feeling of warmness and pain in wound on left foot great toe area.  He feels like the cellulitis is back again.

## 2018-06-19 NOTE — ED NOTES
"[facility]  ED Nurse Handoff Report    Lance Irbahim is a 73 year old male   ED Chief complaint: Wound Infection  . ED Diagnosis:   Final diagnoses:   Left leg cellulitis     Allergies:   Allergies   Allergen Reactions     Penicillins      \"anaphylactic\"     Sulfa Drugs      \"itchy rash, swelling of face and hands\"       Code Status: Full Code  Activity level - Baseline/Home:  Independent. Activity Level - Current:   Stand with Assist. Lift room needed: No. Bariatric: No   Needed: No   Isolation: No. Infection: Not Applicable.     Vital Signs:   Vitals:    06/19/18 1600 06/19/18 1630 06/19/18 1723 06/19/18 1818   BP: 134/77      Resp:       Temp:       TempSrc:       SpO2: (!) 89% 94% 97% 95%   Weight:       Height:           Cardiac Rhythm:  ,      Pain level: 0-10 Pain Scale: 3  Patient confused: No. Patient Falls Risk: Yes.   Elimination Status: Has voided   Patient Report - Initial Complaint: Wound infection. Focused Assessment: Skin- Left great toe wound, on medial aspect of foot. Pt has been trying to remove a callus rasp. Noticed swelling, erythema and draining wound yesterday. Had cellulitis in same area 5 weeks ago. Peripheral- LLE edema +2 with erythema up to calf and warm to the touch. DP pulses present and palpable +2 on RLE, +1 on LLE. Oxygen ssturations around 87-88% on RA, so placed on 3 LPM via NC. Cares for wife at home.   Tests Performed: Labs, Xray. Abnormal Results:   Labs Ordered and Resulted from Time of ED Arrival Up to the Time of Departure from the ED   CBC WITH PLATELETS DIFFERENTIAL - Abnormal; Notable for the following:        Result Value    RBC Count 4.04 (*)     Hemoglobin 12.0 (*)     Hematocrit 37.9 (*)     RDW 17.1 (*)     All other components within normal limits   BASIC METABOLIC PANEL - Abnormal; Notable for the following:     Glucose 180 (*)     All other components within normal limits   LACTIC ACID WHOLE BLOOD - Abnormal; Notable for the following:     " Lactic Acid 2.3 (*)     All other components within normal limits   CRP INFLAMMATION - Abnormal; Notable for the following:     CRP Inflammation 102.0 (*)     All other components within normal limits   LACTIC ACID WHOLE BLOOD   PERIPHERAL IV CATHETER   BLOOD CULTURE   BLOOD CULTURE     Foot XR, G/E 3 views, left   Final Result   IMPRESSION: Bunionectomy changes at the first metatarsal head. Soft   tissue swelling overlying the first metatarsophalangeal joint, but no   definite erosive changes are seen. No fractures identified in the left   foot. Plantar calcaneal spur is seen.      GALLO METCALF MD         Treatments provided: 1 L bolus NS, 2 gram rocephin, 2750 mg vanco infusing  Family Comments: N/A  OBS brochure/video discussed/provided to patient:  N/A  ED Medications:   Medications   0.9% sodium chloride BOLUS (0 mLs Intravenous Stopped 6/19/18 1741)   cefTRIAXone (ROCEPHIN) 2 g vial to attach to  ml bag for ADULTS or NS 50 ml bag for PEDS (0 g Intravenous Stopped 6/19/18 1821)   vancomycin (VANCOCIN) 2,750 mg in sodium chloride 0.9 % 500 mL intermittent infusion (0 mg Intravenous ED Infusing on Admission/transfer 6/19/18 1837)     Drips infusing:  Yes  For the majority of the shift, the patient's behavior Green. Interventions performed were N/A.     Severe Sepsis OR Septic Shock Diagnosis Present: No      ED Nurse Name/Phone Number: Genie Pitts,   6:37 PM    RECEIVING UNIT ED HANDOFF REVIEW    Above ED Nurse Handoff Report was reviewed: Yes  Reviewed by: Laurel Barr on June 19, 2018 at 6:44 PM

## 2018-06-19 NOTE — IP AVS SNAPSHOT
MRN:[id number]                      After Visit Summary   6/19/2018    Lance Ibrahim    MRN: [id number]           Thank you!     Thank you for choosing [facility] for your care. Our goal is always to provide you with excellent care. Hearing back from our patients is one way we can continue to improve our services. Please take a few minutes to complete the written survey that you may receive in the mail after you visit. If you would like to speak to someone directly about your visit please contact Patient Relations at [phone]. Thank you!          Patient Information     Date Of Birth          1945        About your hospital stay     You were admitted on:  June 19, 2018 You last received care in the:  [place] [address] [facility]    You were discharged on:  June 21, 2018        Reason for your hospital stay       You were treated in the hospital for a bacterial infection of your left great toe.  You were seen by podiatry and treated with IV clindamycin and your cellulitis has gotten much better.  You are now safe for discharge home and should follow-up with podiatry in the office                  Who to Call     For medical emergencies, please call 911.  For non-urgent questions about your medical care, please call your primary care provider or clinic, [phone]          Attending Provider     Provider Specialty    Otilio Valderrama MD Emergency Medicine    [facility]       Primary Care Provider Office Phone # Fax #    Anh Spivey [phone]      After Care Instructions     Activity       WB as tolerated in surgical shoe            Activity       Your activity upon discharge: activity as tolerated.  Boot use as discussed            Diet       Follow this diet upon discharge: Orders Placed This Encounter      Moderate Consistent CHO Diet            Dressing       Please continue wound cares per WOC RN at time of  "discharge.                  Follow-up Appointments     Follow Up       Dr. Thomas 2-3 weeks post discharge.    Thank you for choosing [facility]!    DR. THOMAS'S CLINIC LOCATIONS    MONDAY - [place] WEDNESDAY - [place]  [address] [address] [place] [address]  [phone] / [phone] / [phone]     THURSDAY - [place] SCHEDULE SURGERY: [phone]  [address] APPOINTMENTS: [phone]  [place] [address] BILLING QUESTIONS: [phone] / [phone]                  Pending Results     Date and Time Order Name Status Description    6/19/2018 1450 Blood culture Preliminary     6/19/2018 1435 Blood culture Preliminary             Statement of Approval     Ordered          06/21/18 5380  I have reviewed and agree with all the recommendations and orders detailed in this document.  EFFECTIVE NOW     Approved and electronically signed by:  Jhonny Wu III, MD             Admission Information     Date & Time Provider Department Dept. Phone    6/19/2018 Casandra Dill MD [place] [address] [facility] [phone]      Your Vitals Were     Blood Pressure Pulse Temperature Respirations Height Weight    164/84 (BP Location: Left arm) 95 97.4  F (36.3  C) (Oral) 18 1.829 m (6') 122.1 kg (269 lb 1.6 oz)    Pulse Oximetry BMI (Body Mass Index)                94% 36.5 kg/m2          [employer] Information     [employer] lets you send messages to your doctor, view your test results, renew your prescriptions, schedule appointments and more. To sign up, go to [email or web address] . Click on \"Log in\" on the left side of the screen, which will take you to the Welcome page. Then click on \"Sign up Now\" on the right side of the page.     You will be asked to enter the access code listed below, as well as some personal information. Please follow the directions to create your username and password.   "   Your access code is: [email or web address]  Expires: 2018 10:04 AM     Your access code will  in 90 days. If you need help or a new code, please call your [facility] clinic or [phone].        Care EveryWhere ID     This is your Care EveryWhere ID. This could be used by other organizations to access your [facility] medical records  [id number]        Equal Access to Services     GHASSAN [facility] GulfportKRISTI : Hadii aad ku hadasho Soomaali, waaxda luqadaha, qaybta kaalmada adeegyada, ashley whitmanin jasenginger jacobo heron sugey . So [facility] [phone].    ATENCIÓN: Si tomás leonard, tiene a kaiser disposición servicios gratuitos de asistencia lingüística. Llame al [phone].    We comply with applicable federal civil rights laws and [place] laws. We do not discriminate on the basis of race, color, national origin, age, disability, sex, sexual orientation, or gender identity.               Review of your medicines      START taking        Dose / Directions    clindamycin 300 MG capsule   Commonly known as:  CLEOCIN   Used for:  Left leg cellulitis        Dose:  300 mg   Take 1 capsule (300 mg) by mouth 4 times daily   Quantity:  40 capsule   Refills:  0       Probiotic Pack   Used for:  Left leg cellulitis        Dose:  1 Package   Take 1 Package by mouth 2 times daily   Quantity:  20 each   Refills:  1         CONTINUE these medicines which have NOT CHANGED        Dose / Directions    ALEVE 220 MG capsule   Generic drug:  naproxen sodium        Dose:  220 mg   Take 220 mg by mouth as needed.   Refills:  0       aspirin 325 MG tablet        Dose:  325 mg   Take 325 mg by mouth daily   Refills:  0       CENTRUM SILVER per tablet        Dose:  1 tablet   Take 1 tablet by mouth daily.   Refills:  0       ferrous gluconate 324 (38 Fe) MG tablet   Commonly known as:  FERGON        Dose:  1 tablet   Take 1 tablet by mouth daily.   Refills:  0       finasteride 5 MG tablet   Commonly known as:  PROSCAR        Dose:  5 mg   Take 5 mg by mouth  daily.   Refills:  0       fluticasone 50 MCG/ACT spray   Commonly known as:  FLONASE   Used for:  Chronic rhinitis, unspecified type        SPRAY 1-2 SPRAYS INTO BOTH NOSTRILS DAILY   Quantity:  48 g   Refills:  1       lisinopril 10 MG tablet   Commonly known as:  PRINIVIL/ZESTRIL   Used for:  Essential hypertension        TAKE 2 TABLETS (20 MG) BY MOUTH DAILY   Quantity:  180 tablet   Refills:  3       loratadine 10 MG tablet   Commonly known as:  CLARITIN        Dose:  10 mg   Take 10 mg by mouth At Bedtime   Refills:  0       metFORMIN 1000 MG tablet   Commonly known as:  GLUCOPHAGE   Used for:  Type 2 diabetes mellitus without complication, without long-term current use of insulin (H)        TAKE 1 TABLET (1,000 MG) BY MOUTH 2 TIMES DAILY (WITH MEALS)   Quantity:  180 tablet   Refills:  1       SYSTANE BALANCE 0.6 % Soln ophthalmic solution   Indication:  Thera tears   Generic drug:  propylene glycol        Dose:  1 drop   Place 1 drop into both eyes every morning   Refills:  0       tamsulosin 0.4 MG capsule   Commonly known as:  FLOMAX        Dose:  1 capsule   Take 1 capsule by mouth daily.   Refills:  0            Where to get your medicines      These medications were sent to [facility]/pharmacy #[id number] - [facility] [address]  [address] [facility] [address]     Phone:  [phone]     clindamycin 300 MG capsule    Probiotic Pack                Protect others around you: Learn how to safely use, store and throw away your medicines at www.disposemymeds.org.        ANTIBIOTIC INSTRUCTION     You've Been Prescribed an Antibiotic - Now What?  Your healthcare team thinks that you or your loved one might have an infection. Some infections can be treated with antibiotics, which are powerful, life-saving drugs. Like all medications, antibiotics have side effects and should only be used when necessary. There are some important things you should know about your antibiotic treatment.      Your  healthcare team may run tests before you start taking an antibiotic.    Your team may take samples (e.g., from your blood, urine or other areas) to run tests to look for bacteria. These test can be important to determine if you need an antibiotic at all and, if you do, which antibiotic will work best.      Within a few days, your healthcare team might change or even stop your antibiotic.    Your team may start you on an antibiotic while they are working to find out what is making you sick.    Your team might change your antibiotic because test results show that a different antibiotic would be better to treat your infection.    In some cases, once your team has more information, they learn that you do not need an antibiotic at all. They may find out that you don't have an infection, or that the antibiotic you're taking won't work against your infection. For example, an infection caused by a virus can't be treated with antibiotics. Staying on an antibiotic when you don't need it is more likely to be harmful than helpful.      You may experience side effects from your antibiotic.    Like all medications, antibiotics have side effects. Some of these can be serious.    Let you healthcare team know if you have any known allergies when you are admitted to the hospital.    One significant side effect of nearly all antibiotics is the risk of severe and sometimes deadly diarrhea caused by Clostridium difficile (C. Difficile). This occurs when a person takes antibiotics because some good germs are destroyed. Antibiotic use allows C. diificile to take over, putting patients at high risk for this serious infection.    As a patient or caregiver, it is important to understand your or your loved one's antibiotic treatment. It is especially important for caregivers to speak up when patients can't speak for themselves. Here are some important questions to ask your healthcare team.    What infection is this antibiotic treating and how  do you know I have that infection?    What side effects might occur from this antibiotic?    How long will I need to take this antibiotic?    Is it safe to take this antibiotic with other medications or supplements (e.g., vitamins) that I am taking?     Are there any special directions I need to know about taking this antibiotic? For example, should I take it with food?    How will I be monitored to know whether my infection is responding to the antibiotic?    What tests may help to make sure the right antibiotic is prescribed for me?      Information provided by:  www.cdc.gov/getsmart  U.S. Department of Health and Human Services  Centers for disease Control and Prevention  National Center for Emerging and Zoonotic Infectious Diseases  Division of Healthcare Quality Promotion             Medication List: This is a list of all your medications and when to take them. Check marks below indicate your daily home schedule. Keep this list as a reference.      Medications           Morning Afternoon Evening Bedtime As Needed    ALEVE 220 MG capsule   Take 220 mg by mouth as needed.   Generic drug:  naproxen sodium                                aspirin 325 MG tablet   Take 325 mg by mouth daily                                CENTRUM SILVER per tablet   Take 1 tablet by mouth daily.                                clindamycin 300 MG capsule   Commonly known as:  CLEOCIN   Take 1 capsule (300 mg) by mouth 4 times daily                                ferrous gluconate 324 (38 Fe) MG tablet   Commonly known as:  FERGON   Take 1 tablet by mouth daily.                                finasteride 5 MG tablet   Commonly known as:  PROSCAR   Take 5 mg by mouth daily.   Last time this was given:  5 mg on 6/21/2018  8:02 AM                                fluticasone 50 MCG/ACT spray   Commonly known as:  FLONASE   SPRAY 1-2 SPRAYS INTO BOTH NOSTRILS DAILY   Last time this was given:  1 spray on 6/20/2018  9:30 PM                                 lisinopril 10 MG tablet   Commonly known as:  PRINIVIL/ZESTRIL   TAKE 2 TABLETS (20 MG) BY MOUTH DAILY   Last time this was given:  20 mg on 6/21/2018  8:02 AM                                loratadine 10 MG tablet   Commonly known as:  CLARITIN   Take 10 mg by mouth At Bedtime   Last time this was given:  10 mg on 6/20/2018  9:29 PM                                metFORMIN 1000 MG tablet   Commonly known as:  GLUCOPHAGE   TAKE 1 TABLET (1,000 MG) BY MOUTH 2 TIMES DAILY (WITH MEALS)   Last time this was given:  1,000 mg on 6/21/2018  8:02 AM                                Probiotic Pack   Take 1 Package by mouth 2 times daily                                SYSTANE BALANCE 0.6 % Soln ophthalmic solution   Place 1 drop into both eyes every morning   Generic drug:  propylene glycol                                tamsulosin 0.4 MG capsule   Commonly known as:  FLOMAX   Take 1 capsule by mouth daily.   Last time this was given:  0.4 mg on 6/21/2018  8:02 AM

## 2018-06-19 NOTE — PHARMACY-ADMISSION MEDICATION HISTORY
Admission medication history interview status for this patient is complete. See [facility] admission navigator for allergy information, prior to admission medications and immunization status.     Medication history interview source(s):Patient  Medication history resources (including written lists, pill bottles, clinic record):None    Changes made to PTA medication list:  Added: none  Deleted: none  Changed: none    Actions taken by pharmacist (provider contacted, etc):None     Additional medication history information:None    Medication reconciliation/reorder completed by provider prior to medication history? No    For patients on insulin therapy: no (Yes/No)   Lantus/levemir/NPH/Mix 70/30 dose: ___ in AM/PM or twice daily   Sliding scale Novolog Y/N   If Yes, do you have a baseline novolog pre-meal dose: ______units with meals   Patients eat three meals a day: Y/N ---  How many episodes of hypoglycemia (low blood glucose) do you have weekly: ---   How many missed doses do you have a week: ---  How many times do you check your blood glucose per day: ---  Any Barriers to therapy: cost of medications/comfortable with giving injections (if applicable)/ comfortable and confident with current diabetes regimen ---      Prior to Admission medications    Medication Sig Last Dose Taking? Auth Provider   aspirin 325 MG tablet Take 325 mg by mouth daily  6/19/2018 at am Yes Reported, Patient   ferrous gluconate (FERGON) 324 (38 FE) MG tablet Take 1 tablet by mouth daily. 6/19/2018 at am Yes Reported, Patient   finasteride (PROSCAR) 5 MG tablet Take 5 mg by mouth daily. 6/19/2018 at am Yes Reported, Patient   fluticasone (FLONASE) 50 MCG/ACT spray SPRAY 1-2 SPRAYS INTO BOTH NOSTRILS DAILY 6/18/2018 at hs Yes Anh Spivey NP   lisinopril (PRINIVIL/ZESTRIL) 10 MG tablet TAKE 2 TABLETS (20 MG) BY MOUTH DAILY 6/19/2018 at am Yes Anh Spivey NP   loratadine (CLARITIN) 10 MG tablet Take 10 mg by mouth At Bedtime  6/18/2018 at Unknown time Yes Reported, Patient   metFORMIN (GLUCOPHAGE) 1000 MG tablet TAKE 1 TABLET (1,000 MG) BY MOUTH 2 TIMES DAILY (WITH MEALS) 6/19/2018 at am Yes Anh Spivey NP   Multiple Vitamins-Minerals (CENTRUM SILVER) per tablet Take 1 tablet by mouth daily. 6/19/2018 at am Yes Reported, Patient   naproxen sodium (ALEVE) 220 MG capsule Take 220 mg by mouth as needed. prn Yes Reported, Patient   propylene glycol (SYSTANE BALANCE) 0.6 % SOLN Place 1 drop into both eyes every morning  6/19/2018 at am Yes Reported, Patient   tamsulosin (FLOMAX) 0.4 MG 24 hr capsule Take 1 capsule by mouth daily. 6/19/2018 at am Yes Reported, Patient

## 2018-06-20 ENCOUNTER — TELEPHONE (OUTPATIENT)
Dept: INTERNAL MEDICINE | Facility: CLINIC | Age: 73
End: 2018-06-20

## 2018-06-20 DIAGNOSIS — E11.9 TYPE 2 DIABETES MELLITUS WITHOUT COMPLICATION, WITHOUT LONG-TERM CURRENT USE OF INSULIN (H): ICD-10-CM

## 2018-06-20 LAB
ANION GAP SERPL CALCULATED.3IONS-SCNC: 6 MMOL/L (ref 3–14)
BUN SERPL-MCNC: 14 MG/DL (ref 7–30)
CALCIUM SERPL-MCNC: 8.2 MG/DL (ref 8.5–10.1)
CHLORIDE SERPL-SCNC: 105 MMOL/L (ref 94–109)
CO2 SERPL-SCNC: 28 MMOL/L (ref 20–32)
CREAT SERPL-MCNC: 0.9 MG/DL (ref 0.66–1.25)
ERYTHROCYTE [DISTWIDTH] IN BLOOD BY AUTOMATED COUNT: 16.7 % (ref 10–15)
GFR SERPL CREATININE-BSD FRML MDRD: 82 ML/MIN/1.7M2
GLUCOSE BLDC GLUCOMTR-MCNC: 126 MG/DL (ref 70–99)
GLUCOSE BLDC GLUCOMTR-MCNC: 126 MG/DL (ref 70–99)
GLUCOSE BLDC GLUCOMTR-MCNC: 130 MG/DL (ref 70–99)
GLUCOSE BLDC GLUCOMTR-MCNC: 98 MG/DL (ref 70–99)
GLUCOSE BLDC GLUCOMTR-MCNC: 99 MG/DL (ref 70–99)
GLUCOSE SERPL-MCNC: 137 MG/DL (ref 70–99)
HCT VFR BLD AUTO: 35.5 % (ref 40–53)
HGB BLD-MCNC: 11 G/DL (ref 13.3–17.7)
MCH RBC QN AUTO: 29.8 PG (ref 26.5–33)
MCHC RBC AUTO-ENTMCNC: 31 G/DL (ref 31.5–36.5)
MCV RBC AUTO: 96 FL (ref 78–100)
PLATELET # BLD AUTO: 224 10E9/L (ref 150–450)
POTASSIUM SERPL-SCNC: 3.8 MMOL/L (ref 3.4–5.3)
RBC # BLD AUTO: 3.69 10E12/L (ref 4.4–5.9)
SODIUM SERPL-SCNC: 139 MMOL/L (ref 133–144)
WBC # BLD AUTO: 8.7 10E9/L (ref 4–11)

## 2018-06-20 PROCEDURE — 99222 1ST HOSP IP/OBS MODERATE 55: CPT | Mod: 25 | Performed by: PODIATRIST

## 2018-06-20 PROCEDURE — 00000146 ZZHCL STATISTIC GLUCOSE BY METER IP

## 2018-06-20 PROCEDURE — 97602 WOUND(S) CARE NON-SELECTIVE: CPT

## 2018-06-20 PROCEDURE — 99232 SBSQ HOSP IP/OBS MODERATE 35: CPT | Performed by: INTERNAL MEDICINE

## 2018-06-20 PROCEDURE — 25000132 ZZH RX MED GY IP 250 OP 250 PS 637: Performed by: INTERNAL MEDICINE

## 2018-06-20 PROCEDURE — 0JBR0ZZ EXCISION OF LEFT FOOT SUBCUTANEOUS TISSUE AND FASCIA, OPEN APPROACH: ICD-10-PCS | Performed by: PODIATRIST

## 2018-06-20 PROCEDURE — 12000000 ZZH R&B MED SURG/OB

## 2018-06-20 PROCEDURE — 36415 COLL VENOUS BLD VENIPUNCTURE: CPT | Performed by: INTERNAL MEDICINE

## 2018-06-20 PROCEDURE — 25000128 H RX IP 250 OP 636: Performed by: INTERNAL MEDICINE

## 2018-06-20 PROCEDURE — 85027 COMPLETE CBC AUTOMATED: CPT | Performed by: INTERNAL MEDICINE

## 2018-06-20 PROCEDURE — 25000125 ZZHC RX 250: Performed by: INTERNAL MEDICINE

## 2018-06-20 PROCEDURE — 80048 BASIC METABOLIC PNL TOTAL CA: CPT | Performed by: INTERNAL MEDICINE

## 2018-06-20 PROCEDURE — G0463 HOSPITAL OUTPT CLINIC VISIT: HCPCS | Mod: 25

## 2018-06-20 PROCEDURE — 11042 DBRDMT SUBQ TIS 1ST 20SQCM/<: CPT | Performed by: PODIATRIST

## 2018-06-20 RX ADMIN — CLINDAMYCIN PHOSPHATE 600 MG: 12 INJECTION, SOLUTION INTRAVENOUS at 00:22

## 2018-06-20 RX ADMIN — METFORMIN HYDROCHLORIDE 1000 MG: 500 TABLET ORAL at 08:53

## 2018-06-20 RX ADMIN — FLUTICASONE PROPIONATE 1 SPRAY: 50 SPRAY, METERED NASAL at 21:30

## 2018-06-20 RX ADMIN — CLINDAMYCIN PHOSPHATE 600 MG: 12 INJECTION, SOLUTION INTRAVENOUS at 17:17

## 2018-06-20 RX ADMIN — ENOXAPARIN SODIUM 40 MG: 40 INJECTION SUBCUTANEOUS at 21:30

## 2018-06-20 RX ADMIN — TAMSULOSIN HYDROCHLORIDE 0.4 MG: 0.4 CAPSULE ORAL at 08:53

## 2018-06-20 RX ADMIN — LORATADINE 10 MG: 10 TABLET ORAL at 21:29

## 2018-06-20 RX ADMIN — CLINDAMYCIN PHOSPHATE 600 MG: 12 INJECTION, SOLUTION INTRAVENOUS at 08:44

## 2018-06-20 RX ADMIN — FINASTERIDE 5 MG: 5 TABLET, FILM COATED ORAL at 08:52

## 2018-06-20 RX ADMIN — LISINOPRIL 20 MG: 20 TABLET ORAL at 08:53

## 2018-06-20 RX ADMIN — DEXTRAN 70, AND HYPROMELLOSE 2910 1 DROP: 1; 3 SOLUTION/ DROPS OPHTHALMIC at 08:55

## 2018-06-20 RX ADMIN — METFORMIN HYDROCHLORIDE 1000 MG: 500 TABLET ORAL at 17:27

## 2018-06-20 ASSESSMENT — ACTIVITIES OF DAILY LIVING (ADL)
ADLS_ACUITY_SCORE: 9

## 2018-06-20 NOTE — PLAN OF CARE
Problem: Skin and Soft Tissue Infection (Adult)  Goal: Signs and Symptoms of Listed Potential Problems Will be Absent, Minimized or Managed (Skin and Soft Tissue Infection)  Signs and symptoms of listed potential problems will be absent, minimized or managed by discharge/transition of care (reference Skin and Soft Tissue Infection (Adult) CPG).   Outcome: No Change  Ambulatory Status:  Pt up Stand by assist.  VS:  afebrile  Pain:  5/10 pain r/t cellulitis.  Recheck of pain and denies.  Resp: LS Clear  GI:  Denies nausea.  Fair appetite and on Mod Carb diet.  BS active x 4.  Passing flatus.  Last BM today at home.  :  WDL  Skin:  L great toe, wound with cellulitis, some blanchable redness on the intragluteal fold.  Tx:  IV Vanco given in ED  Labs:  Most recent lactic 1.2, , WBC 10.6   Disposition:  TBD

## 2018-06-20 NOTE — PLAN OF CARE
Problem: Patient Care Overview  Goal: Plan of Care/Patient Progress Review  VSS. RM air while awake. Denied pain, Ice applied to ankle. Up ind. Tolerating mod carb diet, bs of 137,99, no coverage per mar. 1+ edema LLE blanchable redness, CMS intact, Continues on IV cleocin. Wound assessed by woc, currently TANVI, awaiting podiatry. Social work consulted. D/C 1-2 days

## 2018-06-20 NOTE — TELEPHONE ENCOUNTER
"Sarah from [facility] called stated that orthotics suggested that patient gets a Cam boot vs a \"walking boot\" he feels that the Cam boot will have better support for patient. They are needing an ok on this. Please call Sarah at [phone] to advise if this is ok to change or not.   "

## 2018-06-20 NOTE — PROGRESS NOTES
"[facility] Nurse Inpatient Wound Assessment     Assessment of wound(s) on pt's:   Left lateral Met head/ Great toe        Data:   Patient History:      per MD note(s): 73-year-old gentleman with a past medical history of type 2 diabetes, BPH with urinary retention, BRIANNA intolerant to CPAP and prior CVA who presents to the emergency department with a left lower extremity cellulitis.        Current Diet / Nutrition:           Active Diet Order      Moderate Consistent CHO Diet                Noé Assessment and sub scores:   Noé Score  Av  Min: 19  Max: 19     Mattress:  Standard , Atmos Air mattress    Labs:         Recent Labs   Lab Test  18   0735   18   1447   18   1951  18   1332   12   0804   ALBUMIN   --    --    --    --    --   3.2*   < >   --    HGB  11.0*   --   12.0*   < >   --   13.1*   < >  13.2*   RBC  3.69*   --   4.04*   < >   --   4.40   < >  4.43   WBC  8.7   --   10.6   < >   --   21.1*   < >  7.7   PLT  224   < >  265   < >  193  240   < >  227   INR   --    --    --    --    --    --    --   1.05   A1C   --    --    --    --   6.7*   --    < >   --    CRP   --    --   102.0*   --    --    --    --    --     < > = values in this interval not displayed.          Wound Assessment (location #1):   Lateral Great toe/ Met Head  Wound History:  Pt reports \"bathroom surgery to remove a callous\"    Specific Dimensions (length x width x depth, in cm):   2x3x0.1cm grey pink soft tissue, boggy blister partially unroofed and callous- red and firm    Periwound Skin: intact, edema, erythema across foot and lower leg,      Drainage:    Amount: scant,  Color: serous    Odor: none    Pain:  absent ,           Intervention:     Patient's chart evaluated.      Wound(s) was assessed    Wound Care: was done:  Visual assessment     Orders  Written    Supplies  ordered: Iodosorb gel, gathered, at bedside, floor stock and discussed with RN    Discussed plan of " care with Patient and Nurse          Assessment:       Left great toe callous, self inflicted surgery to remove callous, went too deep and developed infection.   Pt does not see Podiatry for foot care.          Plan:     Nursing to notify the Provider(s) and re-consult the [facility] Nurse if wound(s) deteriorate(s) or if the wound care plan needs reevaluation.    Plan of care for wound located on Left Great Toe: Daily    1. Wash foot with soap and water, rinse wound with MicroKlenz spray, pat dry    2. Apply grape amount of Iodosorb gel to piece of Adaptic, depress into wound bed    3. Cover with gauze and secure with Kerlix or roll gauze or tube stockinette    4. Elevate foot and float off pillows    Recommend Podiatry to see pt.    [facility] Nurse will return: weekly

## 2018-06-20 NOTE — CONSULTS
Care Transition Initial Assessment -   Reason For Consult: discharge planning  Met with: Patient    Active Problems:    Cellulitis       DATA  Lives With: spouse (he is her caregiver)- Pt works full time M-F     Description of Support System: Supportive  Who is your support system?: Wife, Children  Support Assessment: Adequate family and caregiver support, Adequate social supports. 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 He was interested in support for his wife who due to her medical condition is confined to bed.  PT works full time to keep his health ins.  Pt's has a lot of medication cost. Pt feels his wife is able to still remain at home on her own.. He sets her up before he leaves for work.. Pt's ex spouse and son are supportive of her needs  Pt lives in a split level home and plans to continue to work  Due to Income of spouse alone pt and wife will not be able to receive support for any [facility] services     PLAN  NO other needs at this time.

## 2018-06-20 NOTE — PROGRESS NOTES
[facility]    Hospitalist Progress Note    Date of Service: 06/20/2018    Interval History   Feels dramatically improved compared to yesterday.  Cellulitic area receding, no nausea, vomiting, diarrhea, abdominal pain, fevers or other complaints    Assessment & Plan   This is a very pleasant 73-year-old gentleman with a past medical history of type 2 diabetes, BPH with urinary retention, BRIANNA intolerant to CPAP and prior CVA who presents to the emergency department with a left lower extremity cellulitis.    Patient was attempting to shave down the callus proximal to his left first toe when he apparently slipped and gwendolyn some blood.  This quickly worsened and became indurated and purulent with erythema extending up into his anterior lower leg.  This prompted his presentation to the emergency department.  The emergency department he was nontoxic.  He had a borderline WBC count of 10.6, a CRP of 102 and an obvious left lower extremity cellulitis.  He had an x-ray of the area which was negative and he was admitted to medicine.    Started on clindamycin IV with improvement in his wound and associated cellulitis.    Left lower extremity cellulitis involving the left great toe and extending to his anterior left lower leg  -Improving on IV clindamycin, continue  -Podiatry and wound care consultations pending  -No leukocytosis and nontoxic, I anticipate he should be ready for discharge as soon as tomorrow on oral clindamycin barring additional workup needed from podiatry standpoint    Type 2 diabetes  -Any metformin and sliding scale insulin    Hypertension  -Lisinopril    BPH with urinary retention  -Continue Flomax and Proscar      Discussed with: Patient's RN  # Pain Assessment:  Current Pain Score 6/20/2018   Patient currently in pain? denies   Pain score (0-10) -   Pain location -   Pain descriptors -   Lance johnson pain level was assessed and he currently denies pain.       DVT Prophylaxis: Enoxaparin (Lovenox)  SQ  Code Status: Full Code    Disposition: Expected discharge in 1-2 days once cellulitis receding    Jhonny Wu III, MD  [phone] (Cell)  [phone] (Pager)  Please call or text with any questions or concerns.        Physical Exam   Temp: 98.3  F (36.8  C) Temp src: Oral BP: 165/70   Heart Rate: 90 Resp: 18 SpO2: 92 % O2 Device: None (Room air) Oxygen Delivery: 2 LPM  Vitals:    06/19/18 1344 06/19/18 2047   Weight: 116.6 kg (257 lb) 122.1 kg (269 lb 1.6 oz)     Vital Signs with Ranges  Temp:  [97.1  F (36.2  C)-98.9  F (37.2  C)] 98.3  F (36.8  C)  Heart Rate:  [] 90  Resp:  [18-20] 18  BP: (134-165)/(70-89) 165/70  SpO2:  [85 %-100 %] 92 %       Constitutional: Awake, alert, cooperative, no apparent distress  Respiratory: Clear to auscultation bilaterally, no crackles or wheezing  Cardiovascular: S1S2, well-perfused, no edema  GI: Normal bowel sounds, soft, non-distended, non-tender  Skin/Integumentary: No rashes or other lesions noted  Other: He has induration with an open wound over his left MTP joint with erythema extending into his anterior left lower extremity.  This is receding compared to marking pen.    Medications       clindamycin  600 mg Intravenous Q8H     enoxaparin  40 mg Subcutaneous Q24H     finasteride  5 mg Oral Daily     fluticasone  1 spray Both Nostrils At Bedtime     hypromellose-dextran  1 drop Both Eyes QAM     insulin aspart  1-7 Units Subcutaneous TID AC     insulin aspart  1-5 Units Subcutaneous At Bedtime     lisinopril  20 mg Oral Daily     loratadine  10 mg Oral At Bedtime     metFORMIN  1,000 mg Oral BID w/meals     tamsulosin  0.4 mg Oral Daily       Data     Recent Labs  Lab 06/20/18  0735 06/19/18  2114 06/19/18  1447   WBC 8.7  --  10.6   HGB 11.0*  --  12.0*   MCV 96  --  94    216 265     --  140   POTASSIUM 3.8  --  3.6   CHLORIDE 105  --  104   CO2 28  --  28   BUN 14  --  19   CR 0.90 1.03 1.13   ANIONGAP 6  --  8   SHANNAN 8.2*  --  9.1   GLC  137*  --  180*       Imaging:  Recent Results (from the past 24 hour(s))   Foot XR, G/E 3 views, left    Narrative    XR FOOT LT G/E 3 VW 6/19/2018 3:28 PM    COMPARISON: None.    HISTORY: Cellulitis, concern for osteomyelitis at the first  metatarsophalangeal joint.      Impression    IMPRESSION: Bunionectomy changes at the first metatarsal head. Soft  tissue swelling overlying the first metatarsophalangeal joint, but no  definite erosive changes are seen. No fractures identified in the left  foot. Plantar calcaneal spur is seen.    GALLO METCALF MD       -Data reviewed today: I reviewed all new labs and imaging results over the last 24 hours. I personally reviewed no images or EKG's today.

## 2018-06-20 NOTE — CONSULTS
"[facility]  CONSULTATION NOTE    ASSESSMENT:     Pleasant 73 year old male with type 2 DM, superficial ulcer and abscess, left foot admitted with ascending cellulitis of the left lower extremity. Systemically stable. I do not suspect any deeper infection in the left foot.      PLAN:   I personally reviewed the XR images. No gas.  No obvious changes to suggest osteomyelitis.    Wound was excisionally debrided - see below    Cellulitis is responding to IV Clindamycin.    Continue with wound care per WOC RN recs    Will order a surgical shoe for WBAT    Dr. Ribeiro will check the status of the wound and cellulitis tomorrow.  I anticipate it will continue to resolve. Likely DC to home tomorrow.   Patient is encouraged to follow up with [facility] after discharge.     Thank you for the consultation request and the opportunity to participate in this patient's care.       Edwin Mckay DPM, FACPATSY, MS    [facility]    Pager:  [phone]      Excisional Debridement    The procedure discussed.  This included the goals of removing non-viable tissue, evaluating the full extent of wound, and promoting wound healing.  Pt provided verbal and written consent.  A \"Time Out\" was called.     Using a sterile #15 blade and tissue nippers, excisional debridment of the left foot ulcer was performed, full-thickness to the level of, and including, the fat layer.  The hyperkeratotic eschar surrounding the wound and overlying skin over bullous was removed, by excising skin edges back to healthy, bleeding tissue .  Other non-viable tissue was excised. The ulcer base was scraped to remove bioburden and promote healing.  The area debrided was less than 20 square cm.   There was moderate bleeding with the procedure. No anesthesia needed due to peripheral neuropathy.   A sterile dressing " "applied.        _______________________________________________________________________    CHIEF COMPLAINT:      I was asked by Dr. Dill to evaluate this patient for a left foot callus and cellulitis.    PATIENT HISTORY:  Lance Ibrahim is a 73 year old male with type 2 DM who was admitted through the ED for significant LLE cellulitis.  He reports that this stems from when he was removing a callus on his left foot and injured the skin 10 days ago.  He reports some drainage and  progressive redness with blistering over the past few days. When he looked at his leg on Monday, there was redness ascending toward the knee.  He denies pain. No nausea, vomiting, fever, chills.  He reports a hospitalization last month for more severe LLE cellulitis and being physically ill.      Review of Systems:  A 10 point review of systems was performed and is positive for that noted above in the patient history.  All other areas are negative.     PAST MEDICAL HISTORY:   Past Medical History:   Diagnosis Date     Chronic rhinitis      Diabetes mellitus (H)      HTN (hypertension)      Hypertrophy of prostate with urinary obstruction and other lower urinary tract symptoms (LUTS)      TIA (transient ischaemic attack) 1993     Unspecified transient cerebral ischemia 1993    No definite source found        PAST SURGICAL HISTORY:   Past Surgical History:   Procedure Laterality Date     C NONSPECIFIC PROCEDURE  1985    Diastasis recti repair     C NONSPECIFIC PROCEDURE  1987    Ventral hernia repair     C NONSPECIFIC PROCEDURE      ORIF of left shoulder     COLONOSCOPY  3/9/2013    Procedure: COLONOSCOPY;  COLONOSCOPY;  Surgeon: Chau Hogan MD;  Location:  GI     EYE SURGERY  03/13/2017    left eye     FOOT SURGERY  4/2013    cyst removal      HERNIA REPAIR  7/13/2004    ventral         MEDICATIONS:  Reviewed in Epic. Current IV Clindamycin.     ALLERGIES:    Allergies   Allergen Reactions     Penicillins      \"anaphylactic\"     Sulfa " "Drugs      \"itchy rash, swelling of face and hands\"        SOCIAL HISTORY:   Social History     Social History     Marital status:      Spouse name: N/A     Number of children: N/A     Years of education: N/A     Occupational History      Self     Social History Main Topics     Smoking status: Former Smoker     Quit date: 3/17/1993     Smokeless tobacco: Never Used     Alcohol use Yes      Comment: Occ, Once a month     Drug use: No     Sexual activity: No     Other Topics Concern     Not on file     Social History Narrative        FAMILY HISTORY:   Family History   Problem Relation Age of Onset     Family History Negative Mother       88 yo     Cancer Father       74 yo brain     Diabetes Maternal Grandfather       89 yo     Alcohol/Drug Paternal Grandfather              EXAM:Vitals: /62 (BP Location: Left arm)  Temp 98.3  F (36.8  C) (Oral)  Resp 18  Ht 6' (1.829 m)  Wt 269 lb 1.6 oz (122.1 kg)  SpO2 92%  BMI 36.5 kg/m2  BMI= Body mass index is 36.5 kg/(m^2).    LABS:     Lab Results   Component Value Date    WBC 8.7 2018     Lab Results   Component Value Date    RBC 3.69 2018     Lab Results   Component Value Date    HGB 11.0 2018     Lab Results   Component Value Date    HCT 35.5 2018     No components found for: MCT  Lab Results   Component Value Date    MCV 96 2018     Lab Results   Component Value Date    MCH 29.8 2018     Lab Results   Component Value Date    MCHC 31.0 2018     Lab Results   Component Value Date    RDW 16.7 2018     Lab Results   Component Value Date     2018       Recent Labs   Lab Test  18   0735  18   2114  18   1447   NA  139   --   140   POTASSIUM  3.8   --   3.6   CHLORIDE  105   --   104   CO2  28   --   28   ANIONGAP  6   --   8   GLC  137*   --   180*   BUN  14   --   19   CR  0.90  1.03  1.13   SHANNAN  8.2*   --   9.1       General appearance: Patient is " alert and fully cooperative with history & exam.  No sign of distress is noted during the visit.      Psychiatric: Affect is pleasant & appropriate.  Patient appears motivated to improve health.       Respiratory: Breathing is regular & unlabored while sitting.      HEENT: Hearing is intact to spoken word.  Speech is clear.  No gross evidence of visual impairment that would impact ambulation.       Vascular: DP & PT pulses are intact & regular bilaterally.    CFT and skin temperature is normal to both lower extremities.  Edema surrounding wound, medial left 1st metatarsophalangeal joint.      Neurologic: Lower extremity sensation is diminished bilaterally  to light touch.  No evidence of weakness or contracture in the lower extremities.       Musculoskeletal: Patient is ambulatory without assistive device or brace. Hallux abducto valgus, left foot. No fully reducible. Hallux limitus on the right.     Dermatologic: bullous type lesion over the medial left 1st metatarsophalangeal joint.  ~ 2cm L x 1cm W. There is an open wound at it's distal aspect. This appears superficial limited to the layer of skin.     Post debridement, 2 x 3 cm wound; clean and vascular appearing;  most of it is confined to level of skin.  Area more proximal to fat layer. No deep probing. No sinus tracts.     IMAGING:   XR FOOT LT G/E 3 VW 6/19/2018 3:28 PM     COMPARISON: None.     HISTORY: Cellulitis, concern for osteomyelitis at the first  metatarsophalangeal joint.         IMPRESSION: Bunionectomy changes at the first metatarsal head. Soft  tissue swelling overlying the first metatarsophalangeal joint, but no  definite erosive changes are seen. No fractures identified in the left  foot. Plantar calcaneal spur is seen.     GALLO METCALF MD

## 2018-06-20 NOTE — PROGRESS NOTES
"S: Pt. seen in [facility]. Male. 6'0\", 269 lbs. Dr. Mckay. RX: Surgical shoe. DX: DM , Debridement left medial great toe.    O: Pt. has had a history of DM with ulcerations. Pt. had debridement today on his left medial MTH 1st ray. Pt. ambulates without assistance and has a very long commute to work utilizing a car, bus, rail and 1.5 block walk to work. He also takes care of his wife who is bed ridden on the 2nd floor of residence.  A: I suggest to nurse and Pt. that a Low tide CAM boot would serve him better for quicker healing and less stress on the foot.  P: Nurse to put in a request to  for a Low tide CAM boot. Pt. will be seen in the AM tomorrow.  "

## 2018-06-20 NOTE — H&P
[facility]  Hospitalist Admission Note  June 19, 2018  Name: Lance Ibrahim    MRN: [id number]  YOB: 1945    Age: 73 year old  Date of admission: 6/19/2018  Primary care provider: Anh Spivey      Summary:  Patient is a pleasant 73-year-old male with a history of type 2 diabetes, BPH, urinary retention, obstructive sleep apnea intolerant to CPAP, and CVA who presents here with left lower extremity cellulitis.    Problem list/Plan:  1. Left lower extremity cellulitis: Predominantly involving the left medial MTP joint area where he has a callus along with areas of foot and distal left lower extremity.  Does not appear septic at this point in time.  Minimal purulent drainage from wound site.  At this time, suspect instigated by recent manipulation of left medial MTP callus.  Suspect conventional organisms such as  staph and strep despite being a diabetic.  Patient does report a significant anaphylactic reaction to penicillin when he was 15 years old.  He was given vancomycin and ceftriaxone in the ED. Will transition to IV clindamycin for now.  Consider broadening antibiotics to cover gram negatives and Pseudomonas if symptoms fail to improve.  Will also have podiatry as well as wound care consult come by to see if he needs further treatment of his callus and to assist with wound cares.  2. Type 2 diabetes: We will resume his metformin and place him on a medium intensity sliding scale.  3. Hypertension: Resume lisinopril  4. BPH: Continue Flomax and Proscar      -Additional workup plan which will include  podiatry and wound care consultation     All lab work and imaging data independently reviewed by myself    Prophylaxis;  SQ Lovenox/Ambulation.     Code status: Full code    Discharge: Home unable     Chief Complaint:   L foot erythema   HPI  Patient is a pleasant 73-year-old male with a history of type 2 diabetes, BPH, urinary retention, obstructive sleep apnea  intolerant to CPAP, and CVA who presents here with progressive left lower extremity erythema and warmth.  Notably, the patient was actually hospitalized and discharged on 5/4/18 due to sepsis secondary to left lower extremity cellulitis.  He was treated with IV clindamycin and transition to p.o. clindamycin for 7 additional days at discharge.  Symptoms did improve.  He does admit that he has a callus on the medial aspect of his left MTP.  He was using a scraper and trying to trim down the callus 10 days ago and did admit that he might have nicked his skin a bit.  He has been doing well up until yesterday morning when he noted that there was some increased erythema as well as induration.  When he compressed the induration, he noticed some pus.  Since that time, he noticed some increasing redness around the wound which has since spread up towards his distal lower extremity.  He denies any fever or chills.  He did note that it probably would not get better on his own so he presented here for further evaluation.  Otherwise, he denies any chest pain, shortness of breath, abdominal pain, nausea, vomiting or diarrhea.  I did discuss the case in detail with the ED physician.     Past Medical History:     Past Medical History:   Diagnosis Date     Chronic rhinitis      Diabetes mellitus (H)      HTN (hypertension)      Hypertrophy of prostate with urinary obstruction and other lower urinary tract symptoms (LUTS)      TIA (transient ischaemic attack) 1993     Unspecified transient cerebral ischemia 1993    No definite source found     Past Surgical History:     Past Surgical History:   Procedure Laterality Date     C NONSPECIFIC PROCEDURE  1985    Diastasis recti repair     C NONSPECIFIC PROCEDURE  1987    Ventral hernia repair     C NONSPECIFIC PROCEDURE      ORIF of left shoulder     COLONOSCOPY  3/9/2013    Procedure: COLONOSCOPY;  COLONOSCOPY;  Surgeon: Chau Hogan MD;  Location:  GI     EYE SURGERY  03/13/2017     "left eye     FOOT SURGERY  4/2013    cyst removal      HERNIA REPAIR  7/13/2004    ventral      Social History:     Social History   Substance Use Topics     Smoking status: Former Smoker     Quit date: 3/17/1993     Smokeless tobacco: Never Used     Alcohol use Yes      Comment: Occ, Once a month     Family History:  Family history reviewed. NO pertinent family history     Allergies:     Allergies   Allergen Reactions     Penicillins      \"anaphylactic\"     Sulfa Drugs      \"itchy rash, swelling of face and hands\"     Medications:     Prescriptions Prior to Admission   Medication Sig Dispense Refill Last Dose     aspirin 325 MG tablet Take 325 mg by mouth daily    6/19/2018 at am     ferrous gluconate (FERGON) 324 (38 FE) MG tablet Take 1 tablet by mouth daily.   6/19/2018 at am     finasteride (PROSCAR) 5 MG tablet Take 5 mg by mouth daily.   6/19/2018 at am     fluticasone (FLONASE) 50 MCG/ACT spray SPRAY 1-2 SPRAYS INTO BOTH NOSTRILS DAILY 48 g 1 6/18/2018 at hs     lisinopril (PRINIVIL/ZESTRIL) 10 MG tablet TAKE 2 TABLETS (20 MG) BY MOUTH DAILY 180 tablet 3 6/19/2018 at am     loratadine (CLARITIN) 10 MG tablet Take 10 mg by mouth At Bedtime   6/18/2018 at Unknown time     metFORMIN (GLUCOPHAGE) 1000 MG tablet TAKE 1 TABLET (1,000 MG) BY MOUTH 2 TIMES DAILY (WITH MEALS) 180 tablet 1 6/19/2018 at am     Multiple Vitamins-Minerals (CENTRUM SILVER) per tablet Take 1 tablet by mouth daily.   6/19/2018 at am     naproxen sodium (ALEVE) 220 MG capsule Take 220 mg by mouth as needed.   prn     propylene glycol (SYSTANE BALANCE) 0.6 % SOLN Place 1 drop into both eyes every morning    6/19/2018 at am     tamsulosin (FLOMAX) 0.4 MG 24 hr capsule Take 1 capsule by mouth daily.   6/19/2018 at am       Review of Systems:   A Comprehensive greater than 10 system review of systems was carried out.  Pertinent positives and negatives are noted above.  Otherwise negative for contributory information.        Physical Exam:  Blood " pressure 142/70, temperature 98.9  F (37.2  C), temperature source Oral, resp. rate 20, height 1.829 m (6'), weight 122.1 kg (269 lb 1.6 oz), SpO2 96 %.  Gen: Pleasant in no acute distress.  HEENT: NCAT. EOMI. PERRL.  Neck: Normal inspection. No bruit, JVD or thyromegaly.  Lungs: Normal respiratory effort. Clear to auscultation bilaterally with no crackles or wheezes.  Card: N s1s2. RRR. No M/R/G.  Peripheral pulses present and symmetric.   Skin: L medial MTP callous. No significant induration but mild purulent drg from small open wound. Erythema involving left medial foot region with scattered areas of erythema extending to distal left leg.  Warm to the touch  Extr: No edema. CMS intact  Psychiatric: Patient alert oriented ×3.  Normal affect  Neurologic: Cranial nerves II-XII are intact.  Sensation normal.  Motor strength 5/5    Data:       Recent Labs  Lab 06/19/18 2114 06/19/18  1447   WBC  --  10.6   HGB  --  12.0*   HCT  --  37.9*   MCV  --  94    265       Recent Labs  Lab 06/19/18 2114 06/19/18  1447   NA  --  140   POTASSIUM  --  3.6   CHLORIDE  --  104   CO2  --  28   ANIONGAP  --  8   GLC  --  180*   BUN  --  19   CR 1.03 1.13   GFRESTIMATED 71 63   GFRESTBLACK 85 77   SHANNAN  --  9.1     No results for input(s): COLOR, APPEARANCE, URINEGLC, URINEBILI, URINEKETONE, SG, UBLD, URINEPH, PROTEIN, UROBILINOGEN, NITRITE, LEUKEST, RBCU, WBCU in the last 168 hours.    Imaging:   Recent Results (from the past 24 hour(s))   Foot XR, G/E 3 views, left    Narrative    XR FOOT LT G/E 3 VW 6/19/2018 3:28 PM    COMPARISON: None.    HISTORY: Cellulitis, concern for osteomyelitis at the first  metatarsophalangeal joint.      Impression    IMPRESSION: Bunionectomy changes at the first metatarsal head. Soft  tissue swelling overlying the first metatarsophalangeal joint, but no  definite erosive changes are seen. No fractures identified in the left  foot. Plantar calcaneal spur is seen.    MD Casandra CARLSON  MD Fuentes Pager [phone]

## 2018-06-20 NOTE — PLAN OF CARE
Problem: Patient Care Overview  Goal: Plan of Care/Patient Progress Review  Pt admitted for cellulitis of LLE - boarder marked. IV clindamycin. Dressing changed - dry gauze with wrap. Small amount purulent drainage noted on wound. CMS intact. VSS and afebrile. Denies pain. SBA with ambulation. On 2L O2, hx sleep apnea and intolerant to CPAP. LS clear. BS active, passing gas, last BM yesterday. Voiding without difficulty. Mod carb diet with  overnight.  WOC and Podiatry consulted.

## 2018-06-21 VITALS
HEIGHT: 72 IN | TEMPERATURE: 97.4 F | DIASTOLIC BLOOD PRESSURE: 84 MMHG | WEIGHT: 269.1 LBS | OXYGEN SATURATION: 94 % | RESPIRATION RATE: 18 BRPM | HEART RATE: 95 BPM | BODY MASS INDEX: 36.45 KG/M2 | SYSTOLIC BLOOD PRESSURE: 164 MMHG

## 2018-06-21 LAB
GLUCOSE BLDC GLUCOMTR-MCNC: 114 MG/DL (ref 70–99)
GLUCOSE BLDC GLUCOMTR-MCNC: 131 MG/DL (ref 70–99)
GLUCOSE BLDC GLUCOMTR-MCNC: 89 MG/DL (ref 70–99)

## 2018-06-21 PROCEDURE — 99232 SBSQ HOSP IP/OBS MODERATE 35: CPT | Performed by: PODIATRIST

## 2018-06-21 PROCEDURE — 25000132 ZZH RX MED GY IP 250 OP 250 PS 637: Performed by: INTERNAL MEDICINE

## 2018-06-21 PROCEDURE — 99239 HOSP IP/OBS DSCHRG MGMT >30: CPT | Performed by: INTERNAL MEDICINE

## 2018-06-21 PROCEDURE — 00000146 ZZHCL STATISTIC GLUCOSE BY METER IP

## 2018-06-21 PROCEDURE — 25000125 ZZHC RX 250: Performed by: INTERNAL MEDICINE

## 2018-06-21 PROCEDURE — L4361 PNEUMA/VAC WALK BOOT PRE OTS: HCPCS

## 2018-06-21 RX ORDER — FENUGREEK SEED/BL.THISTLE/ANIS 340 MG
1 CAPSULE ORAL 2 TIMES DAILY
Qty: 20 EACH | Refills: 1 | Status: SHIPPED | OUTPATIENT
Start: 2018-06-21 | End: 2018-10-14

## 2018-06-21 RX ORDER — CLINDAMYCIN HCL 300 MG
300 CAPSULE ORAL 4 TIMES DAILY
Qty: 40 CAPSULE | Refills: 0 | Status: SHIPPED | OUTPATIENT
Start: 2018-06-21 | End: 2018-08-27

## 2018-06-21 RX ADMIN — LISINOPRIL 20 MG: 20 TABLET ORAL at 08:02

## 2018-06-21 RX ADMIN — CLINDAMYCIN PHOSPHATE 600 MG: 12 INJECTION, SOLUTION INTRAVENOUS at 08:03

## 2018-06-21 RX ADMIN — METFORMIN HYDROCHLORIDE 1000 MG: 500 TABLET ORAL at 08:02

## 2018-06-21 RX ADMIN — FINASTERIDE 5 MG: 5 TABLET, FILM COATED ORAL at 08:02

## 2018-06-21 RX ADMIN — CLINDAMYCIN PHOSPHATE 600 MG: 12 INJECTION, SOLUTION INTRAVENOUS at 01:50

## 2018-06-21 RX ADMIN — TAMSULOSIN HYDROCHLORIDE 0.4 MG: 0.4 CAPSULE ORAL at 08:02

## 2018-06-21 RX ADMIN — DEXTRAN 70, AND HYPROMELLOSE 2910 1 DROP: 1; 3 SOLUTION/ DROPS OPHTHALMIC at 08:03

## 2018-06-21 ASSESSMENT — ACTIVITIES OF DAILY LIVING (ADL)
ADLS_ACUITY_SCORE: 9

## 2018-06-21 NOTE — PLAN OF CARE
Problem: Patient Care Overview  Goal: Plan of Care/Patient Progress Review  Outcome: Improving  Left foot debrided by Dr. Mckay. Dressing with scant drainage, came off once, rewrapped, clean dry and intact. Denies pain. Elevated on pillows. Cont on iv atb. Orthotics came, recommend short tide cam boot. Sticky note left for podiatry in am to write order. Night nurse and charge nurse updated.

## 2018-06-21 NOTE — PLAN OF CARE
Problem: Patient Care Overview  Goal: Plan of Care/Patient Progress Review  Outcome: Improving  Orientation: Alert and oriented x4  VSS. 92% on RA.   Tele: N/A. HR 97.   LS: Clear, denies SOB/ISBELL  GI: BS audible/active x4, tolerating PO, . Passing gas. No BM this shift. Denies N/V.   : Adequate urine output. Yes  Skin: Wound Lt great toe  Activity: SBA. Pt slept comfortably throughout shift.   Pain: 0/10. Nothing given for pain this shift.  Plan: IV abx, wound care, pain control. Possible d/c home today. Continue with current cares.

## 2018-06-21 NOTE — DISCHARGE SUMMARY
[facility]    Discharge Summary  Hospitalist    Date of Admission:  6/19/2018  Date of Discharge:  6/21/2018  Discharging Provider: Jhonny Wu III, MD    Code Status   Full Code       Primary Care Physician   Anh Spivey    Consultations This Hospital Stay   PHARMACY TO DOSE VANCO  PODIATRY IP CONSULT  WOUND OSTOMY CONTINENCE NURSE  IP CONSULT  SOCIAL WORK IP CONSULT  ORTHOSIS EXTREMITY LOWER REFERRAL IP CONSULT  ORTHOSIS EXTREMITY LOWER REFERRAL IP CONSULT    Discharge Disposition   Discharged to home  Condition at discharge: Good    Discharge Diagnoses   Active Problems:    Cellulitis    # Discharge Pain Plan:   - Patient currently has NO PAIN and is not being prescribed pain medications on discharge.      History of Present Illness   This is a very pleasant 73-year-old gentleman with a past medical history of type 2 diabetes, BPH with urinary retention, BRIANNA intolerant to CPAP and prior CVA who presents to the emergency department with a left lower extremity cellulitis.     Patient was attempting to shave down the callus proximal to his left first toe when he apparently slipped and gwendolyn some blood.  This quickly worsened and became indurated and purulent with erythema extending up into his anterior lower leg.  This prompted his presentation to the emergency department.  The emergency department he was nontoxic.  He had a borderline WBC count of 10.6, a CRP of 102 and an obvious left lower extremity cellulitis.  He had an x-ray of the area which was negative and he was admitted to medicine.    Hospital Course   He was treated with IV clindamycin and the cellulitis in his calf rapidly receded.  He had persistent cellulitis around his left great toe.  He was seen by podiatry who debrided the area, he was given a orthotic boot and instructions for wound care and he is discharged on a prescription for 10 days of clindamycin with plans to follow-up with podiatry in the clinic        Physical  Exam   Temp: 97.4  F (36.3  C) Temp src: Oral BP: 164/84   Heart Rate: 91 Resp: 18 SpO2: 94 % O2 Device: None (Room air)    Vitals:    06/19/18 1344 06/19/18 2047   Weight: 116.6 kg (257 lb) 122.1 kg (269 lb 1.6 oz)     Vital Signs with Ranges  Temp:  [97.4  F (36.3  C)-99.2  F (37.3  C)] 97.4  F (36.3  C)  Heart Rate:  [91-97] 91  Resp:  [18] 18  BP: (164-171)/(81-84) 164/84  SpO2:  [92 %-94 %] 94 %  I/O last 3 completed shifts:  In: 840 [P.O.:840]  Out: -     Constitutional: Comfortable, well-developed   Eyes: Anicteric, no conjunctival injection  ENT: Atraumatic, membranes moist   Neck: Supple, trachea midline  Respiratory: No wheeze or crackles. Breathing comfortably on room air  Cardiovascular: S1S2, no edema  GI: Abdomen soft, non-tender  Skin: Warm, pink, dry.  His left foot is now in a bulky dressing  Neurologic: Alert and oriented. CN II - XII grossly intact  Psychiatric: Pleasant, cooperative        Discharge Orders     Follow Up   Dr. Thomas 2-3 weeks post discharge.    Thank you for choosing [facility]!    DR. THOMAS'S CLINIC LOCATIONS    MONDAY - [place] WEDNESDAY - [address] [address] [place], [place] [id number] [phone] / [phone] / [phone]    THURSDAY - [place] SCHEDULE SURGERY: [phone]  [address] APPOINTMENTS: [phone]  [place] [address] BILLING QUESTIONS: [phone] / [phone]     Activity   WB as tolerated in surgical shoe     Dressing   Please continue wound cares per WOC RN at time of discharge.     Reason for your hospital stay   You were treated in the hospital for a bacterial infection of your left great toe.  You were seen by podiatry and treated with IV clindamycin and your cellulitis has gotten much better.  You are now safe for discharge home and should follow-up with podiatry in the office     Activity   Your activity upon discharge:  activity as tolerated.  Boot use as discussed     Full Code     Diet   Follow this diet upon discharge: Orders Placed This Encounter     Moderate Consistent CHO Diet       Discharge Medications   Current Discharge Medication List      START taking these medications    Details   clindamycin (CLEOCIN) 300 MG capsule Take 1 capsule (300 mg) by mouth 4 times daily  Qty: 40 capsule, Refills: 0    Associated Diagnoses: Left leg cellulitis      Probiotic PACK Take 1 Package by mouth 2 times daily  Qty: 20 each, Refills: 1    Associated Diagnoses: Left leg cellulitis         CONTINUE these medications which have NOT CHANGED    Details   aspirin 325 MG tablet Take 325 mg by mouth daily       ferrous gluconate (FERGON) 324 (38 FE) MG tablet Take 1 tablet by mouth daily.      finasteride (PROSCAR) 5 MG tablet Take 5 mg by mouth daily.      fluticasone (FLONASE) 50 MCG/ACT spray SPRAY 1-2 SPRAYS INTO BOTH NOSTRILS DAILY  Qty: 48 g, Refills: 1    Associated Diagnoses: Chronic rhinitis, unspecified type      lisinopril (PRINIVIL/ZESTRIL) 10 MG tablet TAKE 2 TABLETS (20 MG) BY MOUTH DAILY  Qty: 180 tablet, Refills: 3    Associated Diagnoses: Essential hypertension      loratadine (CLARITIN) 10 MG tablet Take 10 mg by mouth At Bedtime      metFORMIN (GLUCOPHAGE) 1000 MG tablet TAKE 1 TABLET (1,000 MG) BY MOUTH 2 TIMES DAILY (WITH MEALS)  Qty: 180 tablet, Refills: 1    Comments: Pt due for April appt/labs. Needs to call our office.  Associated Diagnoses: Type 2 diabetes mellitus without complication, without long-term current use of insulin (H)      Multiple Vitamins-Minerals (CENTRUM SILVER) per tablet Take 1 tablet by mouth daily.      naproxen sodium (ALEVE) 220 MG capsule Take 220 mg by mouth as needed.      propylene glycol (SYSTANE BALANCE) 0.6 % SOLN Place 1 drop into both eyes every morning       tamsulosin (FLOMAX) 0.4 MG 24 hr capsule Take 1 capsule by mouth daily.           Allergies   Allergies   Allergen Reactions      "Penicillins      \"anaphylactic\"     Sulfa Drugs      \"itchy rash, swelling of face and hands\"         Data   Most Recent 3 CBC's:  Recent Labs   Lab Test  06/20/18   0735  06/19/18 2114 06/19/18   1447  05/04/18   0749   WBC  8.7   --   10.6  8.6   HGB  11.0*   --   12.0*  12.9*   MCV  96   --   94  92   PLT  224  216  265  227      Most Recent 3 BMP's:  Recent Labs   Lab Test  06/20/18   0735  06/19/18 2114 06/19/18   1447   05/03/18   0643   NA  139   --   140   --   137   POTASSIUM  3.8   --   3.6   --   4.3   CHLORIDE  105   --   104   --   104   CO2  28   --   28   --   28   BUN  14   --   19   --   18   CR  0.90  1.03  1.13   < >  0.94   ANIONGAP  6   --   8   --   5   SHANNAN  8.2*   --   9.1   --   8.9   GLC  137*   --   180*   --   116*    < > = values in this interval not displayed.     Most Recent 2 LFT's:  Recent Labs   Lab Test  05/01/18   1332  10/22/16   2052   AST  14  15   ALT  30  28   ALKPHOS  67  84   BILITOTAL  0.1*  0.2     Most Recent INR's and Anticoagulation Dosing History:  Anticoagulation Dose History     Recent Dosing and Labs Latest Ref Rng & Units 12/24/2012    INR 0.86 - 1.14 1.05        Most Recent 3 Troponin's:  Recent Labs   Lab Test  05/01/18   1342   TROPONIN  0.00     Most Recent Cholesterol Panel:  Recent Labs   Lab Test  10/07/17   1036   CHOL  151   LDL  72   HDL  65   TRIG  68     Most Recent 6 Bacteria Isolates From Any Culture (See EPIC Reports for Culture Details):  Recent Labs   Lab Test  06/19/18   1459  06/19/18   1448  05/01/18   1610  05/01/18   1549  05/01/18   1546  05/25/16   1429   CULT  No growth after 2 days  No growth after 2 days  No growth  No growth  Light growth  Beta hemolytic Streptococcus group B  *  No growth  No Beta Streptococcus isolated     Most Recent TSH, T4 and A1c Labs:  Recent Labs   Lab Test  05/01/18   1951 05/01/18   1332   TSH   --   2.19   A1C  6.7*   --        Time Spent on this Encounter   I, Jhonny Wu III, personally saw the " patient today and spent greater than 30 minutes discharging this patient.    Jhonny Wu III, MD

## 2018-06-21 NOTE — TELEPHONE ENCOUNTER
Routed to Dr. Mckay to advise.    Arthur Gutierrez CMA ([facility])  Podiatry/Foot & Ankle Surgery  [facility]

## 2018-06-21 NOTE — PLAN OF CARE
Problem: Patient Care Overview  Goal: Plan of Care/Patient Progress Review  BP slightly elevated, otherwise vss. Denied pain. Up ind. In room. Dressing to LLE changed per order. See flow sheet for wound description. 1-2+ edema BLE. Tolerating mod carb diet. BS of 131,89, no coverage per mar.Potential d/c this evening pending podiatry.

## 2018-06-21 NOTE — PROGRESS NOTES
[facility]  June 21, 2018    A/P:  Pleasant 73 year old male with type 2 DM, superficial ulcer and abscess, left foot admitted with ascending cellulitis of the left lower extremity. Clinically improved.    -Discussed condition and treatment options including pros and cons.  -OK for d/c on oral abx.  -Wound care per WOC RN.  -pt has offloading boot.  -f/u with Dr. Mckay upon d/c.  -d/c orders from our standpoint have been placed.  -Will sign off.    Charanjit Ribeiro, DPM, FACFAS  Pager: [phone]    S:  Pt seen at bedside.  Denies f/c/n/v.  Pt states his leg/foot are much improved.    O:  /84 (BP Location: Left arm)  Pulse 95  Temp 97.4  F (36.3  C) (Oral)  Resp 18  Ht 1.829 m (6')  Wt 122.1 kg (269 lb 1.6 oz)  SpO2 94%  BMI 36.5 kg/m2  General appearance: Patient is alert and fully cooperative with history & exam.  No sign of distress is noted during the visit.       Vascular: DP & PT pulses are intact & regular on the L.  CFT and skin temperature is normal.  Mild edema surrounding wound, medial left 1st metatarsophalangeal joint.       Neurologic: Lower extremity sensation is diminished bilaterally to light touch.  No evidence of weakness or contracture in the lower extremities.        Musculoskeletal: Patient is ambulatory without assistive device or brace. Hallux abducto valgus, left foot. No fully reducible. Hallux limitus on the right.      Dermatologic:   Post debridement, 2 x 3 cm wound to medial L 1st MPJ; clean and vascular appearing;  most of it is confined to level of skin.  Area more proximal to fat layer. No deep probing. No sinus tracts.   Cellulitis reducing.      XRs neg for osteomyelitis.

## 2018-06-22 ENCOUNTER — TELEPHONE (OUTPATIENT)
Dept: INTERNAL MEDICINE | Facility: CLINIC | Age: 73
End: 2018-06-22

## 2018-06-22 NOTE — TELEPHONE ENCOUNTER
IP F/U    Date: 6/21/18  Diagnosis: Left leg cellulitis  Is patient active in care coordination? No  Was patient in TCU? No

## 2018-06-22 NOTE — TELEPHONE ENCOUNTER
"Requested Prescriptions   Pending Prescriptions Disp Refills     metFORMIN (GLUCOPHAGE) 1000 MG tablet [Pharmacy Med Name: METFORMIN HCL 1,000 MG TABLET] 180 tablet 1    Last Written Prescription Date:  12/20/2017  Last Fill Quantity: 180,  # refills: 3   Last office visit: 5/14/2018 with prescribing provider: Yes    Future Office Visit:   Sig: TAKE 1 TABLET (1,000 MG) BY MOUTH 2 TIMES DAILY (WITH MEALS) **DUE FOR APRIL APPT/LABS. CALL MD**    Biguanide Agents Failed    6/20/2018  1:34 AM       Failed - Blood pressure less than 140/90 in past 6 months    BP Readings from Last 3 Encounters:   06/21/18 164/84   05/14/18 104/70   05/04/18 132/63          Failed - Patient has had a Microalbumin in the past 12 mos.    Recent Labs   Lab Test  03/05/16   0945   MICROL  72   UMALCR  50.35*          Passed - Patient has documented LDL within the past 12 mos.    Recent Labs   Lab Test  10/07/17   1036   LDL  72          Passed - Patient is age 10 or older       Passed - Patient has documented A1c within the specified period of time.    If HgbA1C is 8 or greater, it needs to be on file within the past 3 months.  If less than 8, must be on file within the past 6 months.     Recent Labs   Lab Test  05/01/18   1951   A1C  6.7*          Passed - Patient's CR is NOT>1.4 OR Patient's EGFR is NOT<45 within past 12 mos.    Recent Labs   Lab Test  06/20/18   0735   GFRESTIMATED  82   GFRESTBLACK  >90     Recent Labs   Lab Test  06/20/18   0735   CR  0.90          Passed - Patient does NOT have a diagnosis of CHF.       Passed - Recent (6 mo) or future (30 days) visit within the authorizing provider's specialty    Patient had office visit in the last 6 months or has a visit in the next 30 days with authorizing provider or within the authorizing provider's specialty.  See \"Patient Info\" tab in inbasket, or \"Choose Columns\" in Meds & Orders section of the refill encounter.            Routing refill request to provider for review/approval " because:  Labs out of range:  BP reading and last microalbumin lab on 3/5/16

## 2018-06-23 ENCOUNTER — TRANSFERRED RECORDS (OUTPATIENT)
Dept: HEALTH INFORMATION MANAGEMENT | Facility: CLINIC | Age: 73
End: 2018-06-23

## 2018-06-25 ENCOUNTER — TELEPHONE (OUTPATIENT)
Dept: INTERNAL MEDICINE | Facility: CLINIC | Age: 73
End: 2018-06-25

## 2018-06-25 NOTE — TELEPHONE ENCOUNTER
Tiera--nurse with [employer] calling.  States she just spoke with pt.  Recently DC'd from hosp--infections in L lower leg.  States he is doing well.  Currently on antibiotics.  Wearing a boot.  And has a f/u with a provider next week.    Naz Mott pt does not have an appt here for a f/u.

## 2018-06-27 NOTE — TELEPHONE ENCOUNTER
Patient has no questions or concerns. Taking antibiotic qid. Dressing changes qd. Getting better every day. About 80-85% better. Has follow up appointment with Dr. Mckay 7-9-2018.

## 2018-07-16 ENCOUNTER — OFFICE VISIT (OUTPATIENT)
Dept: PODIATRY | Facility: CLINIC | Age: 73
End: 2018-07-16
Payer: COMMERCIAL

## 2018-07-16 VITALS
HEIGHT: 72 IN | WEIGHT: 258.2 LBS | DIASTOLIC BLOOD PRESSURE: 78 MMHG | BODY MASS INDEX: 34.97 KG/M2 | SYSTOLIC BLOOD PRESSURE: 124 MMHG

## 2018-07-16 DIAGNOSIS — L97.422 DIABETIC ULCER OF LEFT MIDFOOT ASSOCIATED WITH TYPE 2 DIABETES MELLITUS, WITH FAT LAYER EXPOSED (H): ICD-10-CM

## 2018-07-16 DIAGNOSIS — E11.621 DIABETIC ULCER OF LEFT MIDFOOT ASSOCIATED WITH TYPE 2 DIABETES MELLITUS, WITH FAT LAYER EXPOSED (H): ICD-10-CM

## 2018-07-16 DIAGNOSIS — E11.42 TYPE 2 DIABETES MELLITUS WITH DIABETIC POLYNEUROPATHY, WITHOUT LONG-TERM CURRENT USE OF INSULIN (H): Primary | ICD-10-CM

## 2018-07-16 PROCEDURE — 11042 DBRDMT SUBQ TIS 1ST 20SQCM/<: CPT | Performed by: PODIATRIST

## 2018-07-16 PROCEDURE — 99213 OFFICE O/P EST LOW 20 MIN: CPT | Mod: 25 | Performed by: PODIATRIST

## 2018-07-16 NOTE — PROGRESS NOTES
"ASSESSMENT/PLAN:    Encounter Diagnoses   Name Primary?     Type 2 diabetes mellitus with diabetic polyneuropathy, without long-term current use of insulin (H) Yes     Diabetic ulcer of left midfoot associated with type 2 diabetes mellitus, with fat layer exposed (H)      I explained how diminished sensation and his foot structure contribute to the ulceration.     Wound Care Recommendations:    1)  Keep the wound covered by a bandage when bathing.    2)  Gently clean the wound with soap water, separate from bath/shower water.      3)  Each day, apply a topical antibiotic ointment to the wound (Neosporin, Triple antibiotic, Bacitracin).   Cover with large band-aid or gauze.      5)  Please seek immediate medical attention if any increasing redness, drainage, smell, or pain related to the wound.     6)  Please return to clinic in the period of time requested by Dr. Mckay.      If the wound does not heal, he will need to return to the CAM walker that was dispensed while he was in the hospital.     Excisional Debridement    The procedure discussed.  This included the goals of removing non-viable tissue, evaluating the full extent of wound, and promoting wound healing.  Pt provided verbal and written consent.  A \"Time Out\" was called.     Using a sterile #15 blade and tissue nippers, excisional debridment of the left foot ulcer was performed, full-thickness to the level of, and including, the fat layer.  The hyperkeratotic eschar surrounding the wound was removed, by excising skin edges back to healthy, bleeding tissue .  Other non-viable tissue was excised. The ulcer base was scraped to remove bioburden and promote healing.  The area debrided was less than 20 square cm.   There was moderate bleeding with the procedure. No anesthesia needed due to peripheral neuropathy.   A sterile dressing applied.      Follow up in 2-3 weeks.  He left before receiving his after visit summary. At his next visit we will provide " information for orthoses and Diabetes and Your Feet.     Weight management plan: Patient was referred to their PCP to discuss a diet and exercise plan.      Edwin Mckay DPM, FACFAS, MS    [place] [facility] of Pod[facility]ry/Foot & Ankle Surgery      ____________________________________________________________________    HPI:         Chief Complaint: Follow up from hospitalization for left foot cellulitis.  I evaluated him in consultation on 6/20/18.  He received IV antibiotics.  XR negative for deeper infection, gas.     He reports some recent redness.  He has custom orthoses for flat feet. Current devices are several years old.  Type 2 DM.  Last Hgb A1C = 6.7. He reports some numbness in his feet.    Past Medical History:   Diagnosis Date     Chronic rhinitis      Diabetes mellitus (H)      HTN (hypertension)      Hypertrophy of prostate with urinary obstruction and other lower urinary tract symptoms (LUTS)      TIA (transient ischaemic attack) 1993     Unspecified transient cerebral ischemia 1993    No definite source found   *  *  Past Surgical History:   Procedure Laterality Date     C NONSPECIFIC PROCEDURE  1985    Diastasis recti repair     C NONSPECIFIC PROCEDURE  1987    Ventral hernia repair     C NONSPECIFIC PROCEDURE      ORIF of left shoulder     COLONOSCOPY  3/9/2013    Procedure: COLONOSCOPY;  COLONOSCOPY;  Surgeon: Chau Hogan MD;  Location:  GI     EYE SURGERY  03/13/2017    left eye     FOOT SURGERY  4/2013    cyst removal      HERNIA REPAIR  7/13/2004    ventral    *  *  Current Outpatient Prescriptions   Medication Sig Dispense Refill     aspirin 325 MG tablet Take 325 mg by mouth daily        ferrous gluconate (FERGON) 324 (38 FE) MG tablet Take 1 tablet by mouth daily.       finasteride (PROSCAR) 5 MG tablet Take 5 mg by mouth daily.       fluticasone (FLONASE) 50 MCG/ACT spray SPRAY 1-2 SPRAYS INTO BOTH NOSTRILS DAILY 48 g 1     lisinopril (PRINIVIL/ZESTRIL) 10 MG tablet TAKE 2 TABLETS (20  "MG) BY MOUTH DAILY 180 tablet 3     loratadine (CLARITIN) 10 MG tablet Take 10 mg by mouth At Bedtime       metFORMIN (GLUCOPHAGE) 1000 MG tablet TAKE 1 TABLET (1,000 MG) BY MOUTH 2 TIMES DAILY (WITH MEALS) **DUE FOR APRIL APPT/LABS. CALL MD** 180 tablet 1     Multiple Vitamins-Minerals (CENTRUM SILVER) per tablet Take 1 tablet by mouth daily.       naproxen sodium (ALEVE) 220 MG capsule Take 220 mg by mouth as needed.       Probiotic PACK Take 1 Package by mouth 2 times daily 20 each 1     propylene glycol (SYSTANE BALANCE) 0.6 % SOLN Place 1 drop into both eyes every morning        tamsulosin (FLOMAX) 0.4 MG 24 hr capsule Take 1 capsule by mouth daily.       clindamycin (CLEOCIN) 300 MG capsule Take 1 capsule (300 mg) by mouth 4 times daily (Patient not taking: Reported on 7/16/2018) 40 capsule 0       EXAM:    Vitals: /78  Ht 6' (1.829 m)  Wt 258 lb 3.2 oz (117.1 kg)  BMI 35.02 kg/m2  BMI: Body mass index is 35.02 kg/(m^2).  Height: 6' 0\"    Constitutional/ general:  Pt is in no apparent distress, appears well-nourished.  Cooperative with history and physical exam.     Diabetic Foot Exam (details below)    Vascular:  Pedal pulses are palpable bilaterally for both the DP and PT arteries.  CFT < 3 sec.  No edema.  Pedal hair growth noted.     Neuro:  Alert and oriented x 3. Coordinated gait. No evidence of neurological-based weakness, spasticity, or contracture in the lower extremities.  Protective sensation is diminished bilaterally per [place] Monofilament testing.    Derm: Normal texture and turgor.  No erythema, ecchymosis, or cyanosis. Hyperkeratotic lesion with maceration over the plantar medial left first metatarsal head.  Ulceration deep to this:1.3cm x 0.8cm x 0.2cm deep; base is vascular. No malodor.    Musculoskeletal:    Lower extremity muscle strength is normal.  Patient is ambulatory without an assistive device or brace .  Decrease in medial longitudinal arch with weight bearing. "     Edwin Mckay DPM, FACFAS, MS    [place] Department of Podiatry/Foot & Ankle Surgery

## 2018-07-16 NOTE — MR AVS SNAPSHOT
"              After Visit Summary   7/16/2018    Lance Ibrahim    MRN: [id number]           Patient Information     Date Of Birth          1945        Visit Information        Provider Department      7/16/2018 2:30 PM Edwin Mckay DPM [facility]        Today's Diagnoses     Type 2 diabetes mellitus with diabetic polyneuropathy, without long-term current use of insulin (H)    -  1    Diabetic ulcer of left midfoot associated with type 2 diabetes mellitus, with fat layer exposed (H)           Follow-ups after your visit        Who to contact     If you have questions or need follow up information about today's clinic visit or your schedule please contact [facility] directly at [phone].  Normal or non-critical lab and imaging results will be communicated to you by Voxiehart, letter or phone within 4 business days after the clinic has received the results. If you do not hear from us within 7 days, please contact the clinic through Voxiehart or phone. If you have a critical or abnormal lab result, we will notify you by phone as soon as possible.  Submit refill requests through Intrakr or call your pharmacy and they will forward the refill request to us. Please allow 3 business days for your refill to be completed.          Additional Information About Your Visit        Voxiehart Information     Intrakr lets you send messages to your doctor, view your test results, renew your prescriptions, schedule appointments and more. To sign up, go to [email or web address] . Click on \"Log in\" on the left side of the screen, which will take you to the Welcome page. Then click on \"Sign up Now\" on the right side of the page.     You will be asked to enter the access code listed below, as well as some personal information. Please follow the directions to create your username and password.     Your access code is: [email or web address]  Expires: 8/2/2018 10:04 AM     Your access code " will  in 90 days. If you need help or a new code, please call your [place] clinic or [phone].        Care EveryWhere ID     This is your Care EveryWhere ID. This could be used by other organizations to access your [place] medical records  [id number]        Your Vitals Were     Height BMI (Body Mass Index)                6' (1.829 m) 35.02 kg/m2           Blood Pressure from Last 3 Encounters:   18 124/78   18 164/84   18 104/70    Weight from Last 3 Encounters:   18 258 lb 3.2 oz (117.1 kg)   18 269 lb 1.6 oz (122.1 kg)   18 258 lb 8 oz (117.3 kg)              We Performed the Following     DEBRIDE SKIN/SUBQ TISSUE        Primary Care Provider Office Phone # Fax #    Anh Spivey, ELANA [phone]       [address] [facility] [address]        Equal Access to Services     GHASSAN BURGESS : Hadii aad ku hadasho Soomaali, waaxda luqadaha, qaybta kaalmada adeegyada, waxay idiin hayaan sammieg kharajose ramon mayes . So [facility] [phone].    ATENCIÓN: Si habla español, tiene a kaiser disposición servicios gratuitos de asistencia lingüística. Ll[facility] [phone].    We comply with applicable federal civil rights laws and [place] laws. We do not discriminate on the basis of race, color, national origin, age, disability, sex, sexual orientation, or gender identity.            Thank you!     Thank you for choosing [facility]  for your care. Our goal is always to provide you with excellent care. Hearing back from our patients is one way we can continue to improve our services. Please take a few minutes to complete the written survey that you may receive in the mail after your visit with us. Thank you!             Your Updated Medication List - Protect others around you: Learn how to safely use, store and throw away your medicines at www.disposemymeds.org.          This list is accurate as of 18  4:02 PM.  Always use your most  recent med list.                   Brand Name Dispense Instructions for use Diagnosis    ALEVE 220 MG capsule   Generic drug:  naproxen sodium      Take 220 mg by mouth as needed.        aspirin 325 MG tablet      Take 325 mg by mouth daily        CENTRUM SILVER per tablet      Take 1 tablet by mouth daily.        clindamycin 300 MG capsule    CLEOCIN    40 capsule    Take 1 capsule (300 mg) by mouth 4 times daily    Left leg cellulitis       ferrous gluconate 324 (38 Fe) MG tablet    FERGON     Take 1 tablet by mouth daily.        finasteride 5 MG tablet    PROSCAR     Take 5 mg by mouth daily.        fluticasone 50 MCG/ACT spray    FLONASE    48 g    SPRAY 1-2 SPRAYS INTO BOTH NOSTRILS DAILY    Chronic rhinitis, unspecified type       lisinopril 10 MG tablet    PRINIVIL/ZESTRIL    180 tablet    TAKE 2 TABLETS (20 MG) BY MOUTH DAILY    Essential hypertension       loratadine 10 MG tablet    CLARITIN     Take 10 mg by mouth At Bedtime        metFORMIN 1000 MG tablet    GLUCOPHAGE    180 tablet    TAKE 1 TABLET (1,000 MG) BY MOUTH 2 TIMES DAILY (WITH MEALS) **DUE FOR APRIL APPT/LABS. CALL MD**    Type 2 diabetes mellitus without complication, without long-term current use of insulin (H)       Probiotic Pack     20 each    Take 1 Package by mouth 2 times daily    Left leg cellulitis       SYSTANE BALANCE 0.6 % Soln ophthalmic solution   Generic drug:  propylene glycol      Place 1 drop into both eyes every morning        tamsulosin 0.4 MG capsule    FLOMAX     Take 1 capsule by mouth daily.

## 2018-07-16 NOTE — LETTER
"    7/16/2018         RE: Lance Ibrahim  [address] [place] [address]        Dear Colleague,    Thank you for referring your patient, Lance Ibrahim, to the [facility]. Please see a copy of my visit note below.    ASSESSMENT/PLAN:    Encounter Diagnoses   Name Primary?     Type 2 diabetes mellitus with diabetic polyneuropathy, without long-term current use of insulin (H) Yes     Diabetic ulcer of left midfoot associated with type 2 diabetes mellitus, with fat layer exposed (H)      I explained how diminished sensation and his foot structure contribute to the ulceration.     Wound Care Recommendations:    1)  Keep the wound covered by a bandage when bathing.    2)  Gently clean the wound with soap water, separate from bath/shower water.      3)  Each day, apply a topical antibiotic ointment to the wound (Neosporin, Triple antibiotic, Bacitracin).   Cover with large band-aid or gauze.      5)  Please seek immediate medical attention if any increasing redness, drainage, smell, or pain related to the wound.     6)  Please return to clinic in the period of time requested by Dr. Mckay.      If the wound does not heal, he will need to return to the CAM walker that was dispensed while he was in the hospital.     Excisional Debridement    The procedure discussed.  This included the goals of removing non-viable tissue, evaluating the full extent of wound, and promoting wound healing.  Pt provided verbal and written consent.  A \"Time Out\" was called.     Using a sterile #15 blade and tissue nippers, excisional debridment of the left foot ulcer was performed, full-thickness to the level of, and including, the fat layer.  The hyperkeratotic eschar surrounding the wound was removed, by excising skin edges back to healthy, bleeding tissue .  Other non-viable tissue was excised. The ulcer base was scraped to remove bioburden and promote healing.  The area debrided was less than 20 square cm.   " There was moderate bleeding with the procedure. No anesthesia needed due to peripheral neuropathy.   A sterile dressing applied.      Follow up in 2-3 weeks.  He left before receiving his after visit summary. At his next visit we will provide information for orthoses and Diabetes and Your Feet.     Weight management plan: Patient was referred to their PCP to discuss a diet and exercise plan.      Edwin Mckay DPM, FACFAS, MS    [place] Department of Podiatry/Foot & Ankle Surgery      ____________________________________________________________________    HPI:         Chief Complaint: Follow up from hospitalization for left foot cellulitis.  I evaluated him in consultation on 6/20/18.  He received IV antibiotics.  XR negative for deeper infection, gas.     He reports some recent redness.  He has custom orthoses for flat feet. Current devices are several years old.  Type 2 DM.  Last Hgb A1C = 6.7. He reports some numbness in his feet.    Past Medical History:   Diagnosis Date     Chronic rhinitis      Diabetes mellitus (H)      HTN (hypertension)      Hypertrophy of prostate with urinary obstruction and other lower urinary tract symptoms (LUTS)      TIA (transient ischaemic attack) 1993     Unspecified transient cerebral ischemia 1993    No definite source found   *  *  Past Surgical History:   Procedure Laterality Date     C NONSPECIFIC PROCEDURE  1985    Diastasis recti repair     C NONSPECIFIC PROCEDURE  1987    Ventral hernia repair     C NONSPECIFIC PROCEDURE      ORIF of left shoulder     COLONOSCOPY  3/9/2013    Procedure: COLONOSCOPY;  COLONOSCOPY;  Surgeon: Chau Hogan MD;  Location:  GI     EYE SURGERY  03/13/2017    left eye     FOOT SURGERY  4/2013    cyst removal      HERNIA REPAIR  7/13/2004    ventral    *  *  Current Outpatient Prescriptions   Medication Sig Dispense Refill     aspirin 325 MG tablet Take 325 mg by mouth daily        ferrous gluconate (FERGON) 324 (38 FE) MG tablet Take 1  "tablet by mouth daily.       finasteride (PROSCAR) 5 MG tablet Take 5 mg by mouth daily.       fluticasone (FLONASE) 50 MCG/ACT spray SPRAY 1-2 SPRAYS INTO BOTH NOSTRILS DAILY 48 g 1     lisinopril (PRINIVIL/ZESTRIL) 10 MG tablet TAKE 2 TABLETS (20 MG) BY MOUTH DAILY 180 tablet 3     loratadine (CLARITIN) 10 MG tablet Take 10 mg by mouth At Bedtime       metFORMIN (GLUCOPHAGE) 1000 MG tablet TAKE 1 TABLET (1,000 MG) BY MOUTH 2 TIMES DAILY (WITH MEALS) **DUE FOR APRIL APPT/LABS. CALL MD** 180 tablet 1     Multiple Vitamins-Minerals (CENTRUM SILVER) per tablet Take 1 tablet by mouth daily.       naproxen sodium (ALEVE) 220 MG capsule Take 220 mg by mouth as needed.       Probiotic PACK Take 1 Package by mouth 2 times daily 20 each 1     propylene glycol (SYSTANE BALANCE) 0.6 % SOLN Place 1 drop into both eyes every morning        tamsulosin (FLOMAX) 0.4 MG 24 hr capsule Take 1 capsule by mouth daily.       clindamycin (CLEOCIN) 300 MG capsule Take 1 capsule (300 mg) by mouth 4 times daily (Patient not taking: Reported on 7/16/2018) 40 capsule 0       EXAM:    Vitals: /78  Ht 6' (1.829 m)  Wt 258 lb 3.2 oz (117.1 kg)  BMI 35.02 kg/m2  BMI: Body mass index is 35.02 kg/(m^2).  Height: 6' 0\"    Constitutional/ general:  Pt is in no apparent distress, appears well-nourished.  Cooperative with history and physical exam.     Diabetic Foot Exam (details below)    Vascular:  Pedal pulses are palpable bilaterally for both the DP and PT arteries.  CFT < 3 sec.  No edema.  Pedal hair growth noted.     Neuro:  Alert and oriented x 3. Coordinated gait. No evidence of neurological-based weakness, spasticity, or contracture in the lower extremities.  Protective sensation is diminished bilaterally per Yulee-Danielle Monofilament testing.    Derm: Normal texture and turgor.  No erythema, ecchymosis, or cyanosis. Hyperkeratotic lesion with maceration over the plantar medial left first metatarsal head.  Ulceration deep to " this:1.3cm x 0.8cm x 0.2cm deep; base is vascular. No malodor.    Musculoskeletal:    Lower extremity muscle strength is normal.  Patient is ambulatory without an assistive device or brace .  Decrease in medial longitudinal arch with weight bearing.     Edwin Mckay DPM, FACFAS, MS    [place] Department of Podiatry/Foot & Ankle Surgery              Again, thank you for allowing me to participate in the care of your patient.        Sincerely,        Edwin Mckay DPM

## 2018-08-21 ENCOUNTER — TRANSFERRED RECORDS (OUTPATIENT)
Dept: HEALTH INFORMATION MANAGEMENT | Facility: CLINIC | Age: 73
End: 2018-08-21

## 2018-08-27 ENCOUNTER — OFFICE VISIT (OUTPATIENT)
Dept: INTERNAL MEDICINE | Facility: CLINIC | Age: 73
End: 2018-08-27
Payer: COMMERCIAL

## 2018-08-27 VITALS
RESPIRATION RATE: 16 BRPM | OXYGEN SATURATION: 93 % | TEMPERATURE: 98.7 F | HEART RATE: 113 BPM | SYSTOLIC BLOOD PRESSURE: 124 MMHG | WEIGHT: 260.9 LBS | BODY MASS INDEX: 35.34 KG/M2 | DIASTOLIC BLOOD PRESSURE: 72 MMHG | HEIGHT: 72 IN

## 2018-08-27 DIAGNOSIS — E08.621 DIABETIC ULCER OF MIDFOOT ASSOCIATED WITH DIABETES MELLITUS DUE TO UNDERLYING CONDITION, LIMITED TO BREAKDOWN OF SKIN, UNSPECIFIED LATERALITY (H): Primary | ICD-10-CM

## 2018-08-27 DIAGNOSIS — L97.401 DIABETIC ULCER OF MIDFOOT ASSOCIATED WITH DIABETES MELLITUS DUE TO UNDERLYING CONDITION, LIMITED TO BREAKDOWN OF SKIN, UNSPECIFIED LATERALITY (H): Primary | ICD-10-CM

## 2018-08-27 PROCEDURE — 99213 OFFICE O/P EST LOW 20 MIN: CPT | Performed by: NURSE PRACTITIONER

## 2018-08-27 NOTE — MR AVS SNAPSHOT
After Visit Summary   8/27/2018    Lance Ibrahim    MRN: [id number]           Patient Information     Date Of Birth          1945        Visit Information        Provider Department      8/27/2018 3:40 PM Anh Spivey NP [facility]        Today's Diagnoses     Diabetic ulcer of midfoot associated with diabetes mellitus due to underlying condition, limited to breakdown of skin, unspecified laterality (H)    -  1      Care Instructions    continue Cam walker with orthotic  2nd podiatry option prn  Anh Spivey CNP            Follow-ups after your visit        Who to contact     If you have questions or need follow up information about today's clinic visit or your schedule please contact [facility] directly at [phone].  Normal or non-critical lab and imaging results will be communicated to you by MyChart, letter or phone within 4 business days after the clinic has received the results. If you do not hear from us within 7 days, please contact the clinic through MyChart or phone. If you have a critical or abnormal lab result, we will notify you by phone as soon as possible.  Submit refill requests through Riffyn or call your pharmacy and they will forward the refill request to us. Please allow 3 business days for your refill to be completed.          Additional Information About Your Visit        Care EveryWhere ID     This is your Care EveryWhere ID. This could be used by other organizations to access your [place] medical records  [id number]        Your Vitals Were     Pulse Temperature Respirations Height Pulse Oximetry BMI (Body Mass Index)    113 98.7  F (37.1  C) (Oral) 16 6' (1.829 m) 93% 35.38 kg/m2       Blood Pressure from Last 3 Encounters:   08/27/18 124/72   07/16/18 124/78   06/21/18 164/84    Weight from Last 3 Encounters:   08/27/18 260 lb 14.4 oz (118.3 kg)   07/16/18 258 lb 3.2 oz (117.1 kg)   06/19/18 269 lb 1.6 oz (122.1 kg)               Today, you had the following     No orders found for display       Primary Care Provider Office Phone # Fax #    Anh Pruitt Allyssa, ELANA [phone]       [address] [facility] [address]        Equal Access to Services     OTTONIEL BURGESS : Hadii aad ku hadbillyo Soomaali, waaxda luqadaha, qaybta kaalmada adeegyada, waxkofi j carlosin hayaan adelea shelby sugey bran. So [facility] [phone].    ATENCIÓN: Si habla español, tiene a kaiser disposición servicios gratuitos de asistencia lingüística. Llame al [phone].    We comply with applicable federal civil rights laws and [place] laws. We do not discriminate on the basis of race, color, national origin, age, disability, sex, sexual orientation, or gender identity.            Thank you!     Thank you for choosing [facility]  for your care. Our goal is always to provide you with excellent care. Hearing back from our patients is one way we can continue to improve our services. Please take a few minutes to complete the written survey that you may receive in the mail after your visit with us. Thank you!             Your Updated Medication List - Protect others around you: Learn how to safely use, store and throw away your medicines at www.disposemymeds.org.          This list is accurate as of 8/27/18  4:25 PM.  Always use your most recent med list.                   Brand Name Dispense Instructions for use Diagnosis    ALEVE 220 MG capsule   Generic drug:  naproxen sodium      Take 220 mg by mouth as needed.        aspirin 325 MG tablet      Take 325 mg by mouth daily        CENTRUM SILVER per tablet      Take 1 tablet by mouth daily.        ferrous gluconate 324 (38 Fe) MG tablet    FERGON     Take 1 tablet by mouth daily.        finasteride 5 MG tablet    PROSCAR     Take 5 mg by mouth daily.        fluticasone 50 MCG/ACT spray    FLONASE    48 g    SPRAY 1-2 SPRAYS INTO BOTH NOSTRILS DAILY    Chronic rhinitis, unspecified type        GABAPENTIN PO      Take 300 mg by mouth daily        lisinopril 10 MG tablet    PRINIVIL/ZESTRIL    180 tablet    TAKE 2 TABLETS (20 MG) BY MOUTH DAILY    Essential hypertension       loratadine 10 MG tablet    CLARITIN     Take 10 mg by mouth At Bedtime        metFORMIN 1000 MG tablet    GLUCOPHAGE    180 tablet    TAKE 1 TABLET (1,000 MG) BY MOUTH 2 TIMES DAILY (WITH MEALS) **DUE FOR APRIL APPT/LABS. CALL MD**    Type 2 diabetes mellitus without complication, without long-term current use of insulin (H)       Probiotic Pack     20 each    Take 1 Package by mouth 2 times daily    Left leg cellulitis       SYSTANE BALANCE 0.6 % Soln ophthalmic solution   Generic drug:  propylene glycol      Place 1 drop into both eyes every morning        tamsulosin 0.4 MG capsule    FLOMAX     Take 1 capsule by mouth daily.

## 2018-08-27 NOTE — PROGRESS NOTES
SUBJECTIVE:   Lance Ibrahim is a 73 year old male who presents to clinic today for the following health issues:      Musculoskeletal problem/pain      Duration: 2 week     Description  Location: L medial ankle pain and swelling. Resolving L 1st MT ulcer    Intensity:  mild, moderate    Accompanying signs and symptoms: none    History  Previous similar problem: no   Previous evaluation:  [facility] orthopedic evaluation with xrays showing no active structural abnormalities.    Precipitating or alleviating factors:  Trauma or overuse: no   Aggravating factors include: had been using post op boot w/o his custom orthotics.  Improving that is now in Cam walker with orthotic that corrects his foot pronation.     Therapies tried and outcome: nothing          Problem list and histories reviewed & adjusted, as indicated.  Additional history: as documented    Patient Active Problem List   Diagnosis     Ventral hernia     Chronic rhinitis     Hypertrophy of prostate with urinary obstruction     Transient cerebral ischemia     HTN (hypertension)     CARDIOVASCULAR SCREENING; LDL GOAL LESS THAN 100     Advanced directives, counseling/discussion     Gout     Type 2 diabetes, HbA1c goal < 7% (H)     Obesity     Diabetes mellitus type 2, uncomplicated (H)     Benign essential hypertension     Cervicalgia     Sepsis (H)     Cellulitis     Past Surgical History:   Procedure Laterality Date     C NONSPECIFIC PROCEDURE  1985    Diastasis recti repair     C NONSPECIFIC PROCEDURE  1987    Ventral hernia repair     C NONSPECIFIC PROCEDURE      ORIF of left shoulder     COLONOSCOPY  3/9/2013    Procedure: COLONOSCOPY;  COLONOSCOPY;  Surgeon: Chau Hogan MD;  Location:  GI     EYE SURGERY  03/13/2017    left eye     FOOT SURGERY  4/2013    cyst removal      HERNIA REPAIR  7/13/2004    ventral        Social History   Substance Use Topics     Smoking status: Former Smoker     Quit date: 3/17/1993     Smokeless tobacco: Never Used     Alcohol  use Yes      Comment: Occ, Once a month     Family History   Problem Relation Age of Onset     Family History Negative Mother       88 yo     Cancer Father       76 yo brain     Diabetes Maternal Grandfather       89 yo     Alcohol/Drug Paternal Grandfather               Current Outpatient Prescriptions   Medication Sig Dispense Refill     GABAPENTIN PO Take 300 mg by mouth daily       aspirin 325 MG tablet Take 325 mg by mouth daily        ferrous gluconate (FERGON) 324 (38 FE) MG tablet Take 1 tablet by mouth daily.       finasteride (PROSCAR) 5 MG tablet Take 5 mg by mouth daily.       fluticasone (FLONASE) 50 MCG/ACT spray SPRAY 1-2 SPRAYS INTO BOTH NOSTRILS DAILY 48 g 1     lisinopril (PRINIVIL/ZESTRIL) 10 MG tablet TAKE 2 TABLETS (20 MG) BY MOUTH DAILY 180 tablet 3     loratadine (CLARITIN) 10 MG tablet Take 10 mg by mouth At Bedtime       metFORMIN (GLUCOPHAGE) 1000 MG tablet TAKE 1 TABLET (1,000 MG) BY MOUTH 2 TIMES DAILY (WITH MEALS) **DUE FOR APRIL APPT/LABS. CALL MD** 180 tablet 1     Multiple Vitamins-Minerals (CENTRUM SILVER) per tablet Take 1 tablet by mouth daily.       naproxen sodium (ALEVE) 220 MG capsule Take 220 mg by mouth as needed.       Probiotic PACK Take 1 Package by mouth 2 times daily 20 each 1     propylene glycol (SYSTANE BALANCE) 0.6 % SOLN Place 1 drop into both eyes every morning        tamsulosin (FLOMAX) 0.4 MG 24 hr capsule Take 1 capsule by mouth daily.       BP Readings from Last 3 Encounters:   18 124/72   18 124/78   18 164/84    Wt Readings from Last 3 Encounters:   18 260 lb 14.4 oz (118.3 kg)   18 258 lb 3.2 oz (117.1 kg)   18 269 lb 1.6 oz (122.1 kg)                    Reviewed and updated as needed this visit by clinical staff  Tobacco  Allergies  Meds  Med Hx  Surg Hx  Fam Hx  Soc Hx      Reviewed and updated as needed this visit by Provider         ROS:  Constitutional, HEENT, cardiovascular,  pulmonary, gi and gu systems are negative, except as otherwise noted.    OBJECTIVE:     /72 (BP Location: Right arm, Patient Position: Sitting, Cuff Size: Adult Large)  Pulse 113  Temp 98.7  F (37.1  C) (Oral)  Resp 16  Ht 6' (1.829 m)  Wt 260 lb 14.4 oz (118.3 kg)  SpO2 93%  BMI 35.38 kg/m2  Body mass index is 35.38 kg/(m^2).  GENERAL: healthy, alert and no distress  MS: L foot pronation pronounced.  1st MT shallow ulcer crusted without drainage or redness.  Medial ankle no swelling, bruising, cellulitis noted.         ASSESSMENT/PLAN:               ICD-10-CM    1. Diabetic ulcer of midfoot associated with diabetes mellitus due to underlying condition, limited to breakdown of skin, unspecified laterality (H) E08.621     L97.401        Patient Instructions   continue Cam walker with orthotic  2nd podiatry option john Spivey, ELANA  [facility]

## 2018-09-25 ENCOUNTER — TELEPHONE (OUTPATIENT)
Dept: INTERNAL MEDICINE | Facility: CLINIC | Age: 73
End: 2018-09-25

## 2018-09-25 DIAGNOSIS — E11.22 TYPE 2 DIABETES MELLITUS WITH CHRONIC KIDNEY DISEASE, WITH LONG-TERM CURRENT USE OF INSULIN, UNSPECIFIED CKD STAGE (H): ICD-10-CM

## 2018-09-25 DIAGNOSIS — Z79.4 TYPE 2 DIABETES MELLITUS WITH CHRONIC KIDNEY DISEASE, WITH LONG-TERM CURRENT USE OF INSULIN, UNSPECIFIED CKD STAGE (H): ICD-10-CM

## 2018-09-25 NOTE — TELEPHONE ENCOUNTER
[facility] called stating Dr. Spivey is not up to date on her Medicare Part B and pt cannot get a R/F of his test strips.  Writer spoke with Dr. Spivey to inform her of this, and I did send an e-mail to Karen Vance to find out who does the credentials. Dr. Spivey stated another provider Dr. Zendejas would approve of his test strips if there is a problem with her credentials.    Tiarra Rowe RN

## 2018-10-04 ENCOUNTER — TRANSFERRED RECORDS (OUTPATIENT)
Dept: HEALTH INFORMATION MANAGEMENT | Facility: CLINIC | Age: 73
End: 2018-10-04

## 2018-10-14 ENCOUNTER — APPOINTMENT (OUTPATIENT)
Dept: GENERAL RADIOLOGY | Facility: CLINIC | Age: 73
DRG: 853 | End: 2018-10-14
Attending: EMERGENCY MEDICINE
Payer: COMMERCIAL

## 2018-10-14 ENCOUNTER — HOSPITAL ENCOUNTER (INPATIENT)
Facility: CLINIC | Age: 73
LOS: 8 days | Discharge: NURSING, REHAB OR LONG-TERM CARE | DRG: 853 | End: 2018-10-22
Attending: EMERGENCY MEDICINE | Admitting: INTERNAL MEDICINE
Payer: COMMERCIAL

## 2018-10-14 DIAGNOSIS — K29.00 ACUTE SUPERFICIAL GASTRITIS WITHOUT HEMORRHAGE: ICD-10-CM

## 2018-10-14 DIAGNOSIS — G62.9 NEUROPATHY: ICD-10-CM

## 2018-10-14 DIAGNOSIS — I10 ESSENTIAL HYPERTENSION: ICD-10-CM

## 2018-10-14 DIAGNOSIS — M10.9 ACUTE GOUTY ARTHROPATHY: ICD-10-CM

## 2018-10-14 DIAGNOSIS — E11.628 DIABETIC FOOT INFECTION (H): ICD-10-CM

## 2018-10-14 DIAGNOSIS — M25.50 MULTIPLE JOINT PAIN: Primary | ICD-10-CM

## 2018-10-14 DIAGNOSIS — L08.9 DIABETIC FOOT INFECTION (H): ICD-10-CM

## 2018-10-14 DIAGNOSIS — L02.419 CELLULITIS AND ABSCESS OF LEG: ICD-10-CM

## 2018-10-14 DIAGNOSIS — L03.119 CELLULITIS AND ABSCESS OF LEG: ICD-10-CM

## 2018-10-14 DIAGNOSIS — E11.00 TYPE 2 DIABETES MELLITUS WITH HYPEROSMOLARITY WITHOUT COMA, UNSPECIFIED WHETHER LONG TERM INSULIN USE (H): ICD-10-CM

## 2018-10-14 LAB
ANION GAP SERPL CALCULATED.3IONS-SCNC: 7 MMOL/L (ref 3–14)
BASOPHILS # BLD AUTO: 0.1 10E9/L (ref 0–0.2)
BASOPHILS NFR BLD AUTO: 0.3 %
BUN SERPL-MCNC: 18 MG/DL (ref 7–30)
CALCIUM SERPL-MCNC: 9.4 MG/DL (ref 8.5–10.1)
CHLORIDE SERPL-SCNC: 104 MMOL/L (ref 94–109)
CO2 SERPL-SCNC: 26 MMOL/L (ref 20–32)
CREAT SERPL-MCNC: 1.28 MG/DL (ref 0.66–1.25)
DIFFERENTIAL METHOD BLD: ABNORMAL
EOSINOPHIL # BLD AUTO: 0 10E9/L (ref 0–0.7)
EOSINOPHIL NFR BLD AUTO: 0.2 %
ERYTHROCYTE [DISTWIDTH] IN BLOOD BY AUTOMATED COUNT: 15.9 % (ref 10–15)
GFR SERPL CREATININE-BSD FRML MDRD: 55 ML/MIN/1.7M2
GLUCOSE BLDC GLUCOMTR-MCNC: 134 MG/DL (ref 70–99)
GLUCOSE BLDC GLUCOMTR-MCNC: 174 MG/DL (ref 70–99)
GLUCOSE SERPL-MCNC: 134 MG/DL (ref 70–99)
HCT VFR BLD AUTO: 39.1 % (ref 40–53)
HGB BLD-MCNC: 12.2 G/DL (ref 13.3–17.7)
IMM GRANULOCYTES # BLD: 0.2 10E9/L (ref 0–0.4)
IMM GRANULOCYTES NFR BLD: 0.9 %
LACTATE SERPL-SCNC: 2 MMOL/L (ref 0.4–2)
LYMPHOCYTES # BLD AUTO: 0.8 10E9/L (ref 0.8–5.3)
LYMPHOCYTES NFR BLD AUTO: 4.3 %
MCH RBC QN AUTO: 28.8 PG (ref 26.5–33)
MCHC RBC AUTO-ENTMCNC: 31.2 G/DL (ref 31.5–36.5)
MCV RBC AUTO: 92 FL (ref 78–100)
MONOCYTES # BLD AUTO: 1.2 10E9/L (ref 0–1.3)
MONOCYTES NFR BLD AUTO: 6.8 %
NEUTROPHILS # BLD AUTO: 15.8 10E9/L (ref 1.6–8.3)
NEUTROPHILS NFR BLD AUTO: 87.5 %
NRBC # BLD AUTO: 0 10*3/UL
NRBC BLD AUTO-RTO: 0 /100
PLATELET # BLD AUTO: 323 10E9/L (ref 150–450)
POTASSIUM SERPL-SCNC: 4.2 MMOL/L (ref 3.4–5.3)
RBC # BLD AUTO: 4.24 10E12/L (ref 4.4–5.9)
SODIUM SERPL-SCNC: 137 MMOL/L (ref 133–144)
WBC # BLD AUTO: 18 10E9/L (ref 4–11)

## 2018-10-14 PROCEDURE — 80048 BASIC METABOLIC PNL TOTAL CA: CPT | Performed by: EMERGENCY MEDICINE

## 2018-10-14 PROCEDURE — 12000000 ZZH R&B MED SURG/OB

## 2018-10-14 PROCEDURE — 73590 X-RAY EXAM OF LOWER LEG: CPT | Mod: LT

## 2018-10-14 PROCEDURE — 73630 X-RAY EXAM OF FOOT: CPT | Mod: LT

## 2018-10-14 PROCEDURE — 36415 COLL VENOUS BLD VENIPUNCTURE: CPT | Performed by: EMERGENCY MEDICINE

## 2018-10-14 PROCEDURE — 25000128 H RX IP 250 OP 636: Performed by: EMERGENCY MEDICINE

## 2018-10-14 PROCEDURE — 87040 BLOOD CULTURE FOR BACTERIA: CPT | Performed by: EMERGENCY MEDICINE

## 2018-10-14 PROCEDURE — 25000132 ZZH RX MED GY IP 250 OP 250 PS 637: Performed by: INTERNAL MEDICINE

## 2018-10-14 PROCEDURE — 25000125 ZZHC RX 250: Performed by: EMERGENCY MEDICINE

## 2018-10-14 PROCEDURE — 99285 EMERGENCY DEPT VISIT HI MDM: CPT | Mod: 25

## 2018-10-14 PROCEDURE — 25000132 ZZH RX MED GY IP 250 OP 250 PS 637: Performed by: EMERGENCY MEDICINE

## 2018-10-14 PROCEDURE — 96366 THER/PROPH/DIAG IV INF ADDON: CPT

## 2018-10-14 PROCEDURE — 25000128 H RX IP 250 OP 636: Performed by: INTERNAL MEDICINE

## 2018-10-14 PROCEDURE — 96375 TX/PRO/DX INJ NEW DRUG ADDON: CPT

## 2018-10-14 PROCEDURE — 96365 THER/PROPH/DIAG IV INF INIT: CPT

## 2018-10-14 PROCEDURE — 99223 1ST HOSP IP/OBS HIGH 75: CPT | Mod: AI | Performed by: INTERNAL MEDICINE

## 2018-10-14 PROCEDURE — 85025 COMPLETE CBC W/AUTO DIFF WBC: CPT | Performed by: EMERGENCY MEDICINE

## 2018-10-14 PROCEDURE — 96368 THER/DIAG CONCURRENT INF: CPT

## 2018-10-14 PROCEDURE — 83605 ASSAY OF LACTIC ACID: CPT | Performed by: EMERGENCY MEDICINE

## 2018-10-14 PROCEDURE — 00000146 ZZHCL STATISTIC GLUCOSE BY METER IP

## 2018-10-14 RX ORDER — ACETAMINOPHEN 325 MG/1
650 TABLET ORAL EVERY 4 HOURS PRN
Status: DISCONTINUED | OUTPATIENT
Start: 2018-10-14 | End: 2018-10-22 | Stop reason: HOSPADM

## 2018-10-14 RX ORDER — CLINDAMYCIN PHOSPHATE 900 MG/50ML
900 INJECTION, SOLUTION INTRAVENOUS ONCE
Status: COMPLETED | OUTPATIENT
Start: 2018-10-14 | End: 2018-10-14

## 2018-10-14 RX ORDER — LORATADINE 10 MG/1
10 TABLET ORAL AT BEDTIME
Status: DISCONTINUED | OUTPATIENT
Start: 2018-10-14 | End: 2018-10-22 | Stop reason: HOSPADM

## 2018-10-14 RX ORDER — LIDOCAINE 40 MG/G
CREAM TOPICAL
Status: DISCONTINUED | OUTPATIENT
Start: 2018-10-14 | End: 2018-10-22 | Stop reason: HOSPADM

## 2018-10-14 RX ORDER — NALOXONE HYDROCHLORIDE 0.4 MG/ML
.1-.4 INJECTION, SOLUTION INTRAMUSCULAR; INTRAVENOUS; SUBCUTANEOUS
Status: DISCONTINUED | OUTPATIENT
Start: 2018-10-14 | End: 2018-10-22 | Stop reason: HOSPADM

## 2018-10-14 RX ORDER — CEFAZOLIN SODIUM 1 G/50ML
2000 SOLUTION INTRAVENOUS EVERY 12 HOURS
Status: DISCONTINUED | OUTPATIENT
Start: 2018-10-15 | End: 2018-10-17

## 2018-10-14 RX ORDER — ACETAMINOPHEN 325 MG/1
975 TABLET ORAL ONCE
Status: COMPLETED | OUTPATIENT
Start: 2018-10-14 | End: 2018-10-14

## 2018-10-14 RX ORDER — CEFEPIME HYDROCHLORIDE 1 G/1
1 INJECTION, POWDER, FOR SOLUTION INTRAMUSCULAR; INTRAVENOUS ONCE
Status: COMPLETED | OUTPATIENT
Start: 2018-10-14 | End: 2018-10-14

## 2018-10-14 RX ORDER — FINASTERIDE 5 MG/1
5 TABLET, FILM COATED ORAL DAILY
Status: DISCONTINUED | OUTPATIENT
Start: 2018-10-15 | End: 2018-10-22 | Stop reason: HOSPADM

## 2018-10-14 RX ORDER — SODIUM CHLORIDE 9 MG/ML
INJECTION, SOLUTION INTRAVENOUS CONTINUOUS
Status: DISCONTINUED | OUTPATIENT
Start: 2018-10-14 | End: 2018-10-16

## 2018-10-14 RX ORDER — AMOXICILLIN 250 MG
2 CAPSULE ORAL 2 TIMES DAILY PRN
Status: DISCONTINUED | OUTPATIENT
Start: 2018-10-14 | End: 2018-10-22 | Stop reason: HOSPADM

## 2018-10-14 RX ORDER — LISINOPRIL 20 MG/1
20 TABLET ORAL DAILY
Status: DISCONTINUED | OUTPATIENT
Start: 2018-10-15 | End: 2018-10-22 | Stop reason: HOSPADM

## 2018-10-14 RX ORDER — ASPIRIN 325 MG
325 TABLET ORAL DAILY
Status: DISCONTINUED | OUTPATIENT
Start: 2018-10-15 | End: 2018-10-22 | Stop reason: HOSPADM

## 2018-10-14 RX ORDER — AMOXICILLIN 250 MG
1 CAPSULE ORAL 2 TIMES DAILY PRN
Status: DISCONTINUED | OUTPATIENT
Start: 2018-10-14 | End: 2018-10-22 | Stop reason: HOSPADM

## 2018-10-14 RX ORDER — OXYCODONE HYDROCHLORIDE 5 MG/1
5 TABLET ORAL EVERY 4 HOURS PRN
Status: DISCONTINUED | OUTPATIENT
Start: 2018-10-14 | End: 2018-10-17

## 2018-10-14 RX ORDER — NICOTINE POLACRILEX 4 MG
15-30 LOZENGE BUCCAL
Status: DISCONTINUED | OUTPATIENT
Start: 2018-10-14 | End: 2018-10-22 | Stop reason: HOSPADM

## 2018-10-14 RX ORDER — ONDANSETRON 2 MG/ML
4 INJECTION INTRAMUSCULAR; INTRAVENOUS EVERY 6 HOURS PRN
Status: DISCONTINUED | OUTPATIENT
Start: 2018-10-14 | End: 2018-10-22 | Stop reason: HOSPADM

## 2018-10-14 RX ORDER — CEFEPIME 1 G/50ML
1 INJECTION, SOLUTION INTRAVENOUS ONCE
Status: DISCONTINUED | OUTPATIENT
Start: 2018-10-14 | End: 2018-10-14

## 2018-10-14 RX ORDER — CEFEPIME HYDROCHLORIDE 1 G/1
1 INJECTION, POWDER, FOR SOLUTION INTRAMUSCULAR; INTRAVENOUS EVERY 12 HOURS
Status: DISCONTINUED | OUTPATIENT
Start: 2018-10-15 | End: 2018-10-18

## 2018-10-14 RX ORDER — POLYETHYLENE GLYCOL 3350 17 G/17G
17 POWDER, FOR SOLUTION ORAL DAILY PRN
Status: DISCONTINUED | OUTPATIENT
Start: 2018-10-14 | End: 2018-10-22 | Stop reason: HOSPADM

## 2018-10-14 RX ORDER — DEXTROSE MONOHYDRATE 25 G/50ML
25-50 INJECTION, SOLUTION INTRAVENOUS
Status: DISCONTINUED | OUTPATIENT
Start: 2018-10-14 | End: 2018-10-22 | Stop reason: HOSPADM

## 2018-10-14 RX ORDER — TAMSULOSIN HYDROCHLORIDE 0.4 MG/1
0.4 CAPSULE ORAL DAILY
Status: DISCONTINUED | OUTPATIENT
Start: 2018-10-15 | End: 2018-10-22 | Stop reason: HOSPADM

## 2018-10-14 RX ORDER — FLUTICASONE PROPIONATE 50 MCG
1 SPRAY, SUSPENSION (ML) NASAL AT BEDTIME
COMMUNITY
End: 2022-08-25

## 2018-10-14 RX ORDER — DIPHENHYDRAMINE HYDROCHLORIDE 50 MG/ML
25 INJECTION INTRAMUSCULAR; INTRAVENOUS ONCE
Status: COMPLETED | OUTPATIENT
Start: 2018-10-14 | End: 2018-10-14

## 2018-10-14 RX ORDER — ONDANSETRON 4 MG/1
4 TABLET, ORALLY DISINTEGRATING ORAL EVERY 6 HOURS PRN
Status: DISCONTINUED | OUTPATIENT
Start: 2018-10-14 | End: 2018-10-22 | Stop reason: HOSPADM

## 2018-10-14 RX ADMIN — CLINDAMYCIN PHOSPHATE 900 MG: 900 INJECTION, SOLUTION INTRAVENOUS at 15:28

## 2018-10-14 RX ADMIN — SODIUM CHLORIDE 3000 ML: 9 INJECTION, SOLUTION INTRAVENOUS at 14:59

## 2018-10-14 RX ADMIN — VANCOMYCIN HYDROCHLORIDE 1500 MG: 1 INJECTION, POWDER, LYOPHILIZED, FOR SOLUTION INTRAVENOUS at 15:25

## 2018-10-14 RX ADMIN — ACETAMINOPHEN 650 MG: 325 TABLET, FILM COATED ORAL at 20:09

## 2018-10-14 RX ADMIN — ACETAMINOPHEN 975 MG: 325 TABLET, FILM COATED ORAL at 16:49

## 2018-10-14 RX ADMIN — LORATADINE 10 MG: 10 TABLET ORAL at 20:10

## 2018-10-14 RX ADMIN — SODIUM CHLORIDE: 9 INJECTION, SOLUTION INTRAVENOUS at 18:10

## 2018-10-14 RX ADMIN — DIPHENHYDRAMINE HYDROCHLORIDE 25 MG: 50 INJECTION, SOLUTION INTRAMUSCULAR; INTRAVENOUS at 16:49

## 2018-10-14 RX ADMIN — CEFEPIME HYDROCHLORIDE 1 G: 1 INJECTION, POWDER, FOR SOLUTION INTRAMUSCULAR; INTRAVENOUS at 16:27

## 2018-10-14 ASSESSMENT — ENCOUNTER SYMPTOMS
JOINT SWELLING: 1
FEVER: 0
MYALGIAS: 1
SHORTNESS OF BREATH: 0
NAUSEA: 1
VOMITING: 1

## 2018-10-14 ASSESSMENT — ACTIVITIES OF DAILY LIVING (ADL): ADLS_ACUITY_SCORE: 9

## 2018-10-14 NOTE — H&P
Admitted:     10/14/2018      PRIMARY CARE PHYSICIAN:  Anh Spivey NP      ASSESSMENT AND PLAN:  This is a 73-year-old gentleman with a past medical history significant for hypertension, diabetes mellitus, TIA, with 2 previous hospitalizations in the last few months, with a left lower extremity cellulitis.  The patient mentions that his last course of cellulitis was treated with antibiotics and the foot was doing much better.  He now comes to the hospital with worsening redness and cellulitis of the foot, which has rapidly worsened since yesterday morning, and coming to the ER.  His x-ray is concerning for possible osteomyelitis and he is being admitted to the hospital.     1.  Left foot diabetic infection, with associated cellulitis and concerns for possible osteomyelitis.  The patient has signs of systemic toxicity with concerns for sepsis.  He has a leukocytosis  of 18,000, tachycardia, low-grade fever.  Fortunately, he is hemodynamically stable with a normal lactic acid.  His physical examinations or the x-rays do not show any signs of any gas or necrotizing fasciitis.      At this time, we admitted the patient to the hospital, we treated him with broad-spectrum antibiotics with IV vancomycin and cefepime given the severity of the infection.  He does have a history of PENICILLIN ALLERGY LISTED AS ANAPHYLAXIS, although he has tolerated cefepime in the ER and has tolerated ceftriaxone in the past as well.      I will also request a consultation from Podiatry and Infectious Disease to see the patient.      We will obtain MRI of the left foot to evaluate for osteomyelitis in further detail.  He will be closely monitored on the medical floor for now.   2.  Acute kidney injury.  The patient's creatinine is mildly elevated from his baseline up to 1.28.  We will give him IV fluids.  I will recheck this again tomorrow morning.  Certainly if this further worsens, then we may need to hold his ACE inhibitor as well.   Avoid NSAIDs.   3.  Hypertension.  Resume the patient on his lisinopril and watch his creatinine.   4.  Diabetes mellitus type 2.  I will hold the patient's metformin for now in case he needs any dye for upcoming surgeries.  We will have him on sliding scale insulin for now and watch his blood sugars closely.   5.  Resume the patient on his Flomax and finasteride.   6.  Deep venous thrombosis prophylaxis.  This is mechanical at this time in case patient needs any surgical intervention.      CHIEF COMPLAINT:  Left leg redness.      HISTORY OF PRESENT ILLNESS:  This is a very pleasant 73-year-old gentleman with previous hospital admissions for left leg cellulitis who comes to the hospital with concerns for left leg pain.  The patient mentions that he maybe went over board trimming his toenails and also has been using a new orthotic which he thinks might have bruised his foot.  He noticed onset of redness and swelling in the foot yesterday morning.  He mentions this has rapidly worsened since yesterday and ascending up his leg and becoming more painful.  He also has some episode of nausea, having a feeling of warmth at home and has not been tolerating oral intake very well.  This prompted him to come to the ER today.  He was noticed to be tachycardic in the ER and was given some IV fluids.  He denies any chest pain, shortness of breath or any recent trauma otherwise.      PHYSICAL EXAMINATION:   VITAL SIGNS:  Blood pressure is 120/72, heart rate is 116, respiration rate is 18, oxygen 97% on room air, temperature is 100.2.   GENERAL:  A very pleasant white  male lying in the bed in no obvious distress, calm and cooperative.   HEENT:  Normocephalic, atraumatic.  Mucosa is moist.   NECK:  Supple.  He has some facial flushing.   CARDIOVASCULAR:  Regular rhythm, normal S1 and S2.  No loud murmurs, rubs or gallops.   PULMONARY:  Lungs are clear to auscultation bilaterally.   ABDOMEN:  Soft, nontender, positive bowel  sounds, no rebound or guarding.   EXTREMITIES:  The patient has swelling of the left foot which is extending up to his shin.  Of particular note, is the third digit with appears to have some ulceration on the bottom with weeping.  There is no obvious crepitus or obvious signs of necrosis noted.   NEUROLOGIC:  The patient is awake, alert, oriented x3.  He has good strength in all extremities.   SKIN:  Area of redness and cellulitis as described above.  Otherwise, no obvious rashes are noted.        Past Medical History    I have reviewed this patient's medical history and updated it with pertinent information if needed.   Past Medical History:   Diagnosis Date     Chronic rhinitis      Diabetes mellitus (H)      HTN (hypertension)      Hypertrophy of prostate with urinary obstruction and other lower urinary tract symptoms (LUTS)      TIA (transient ischaemic attack) 1993     Unspecified transient cerebral ischemia 1993    No definite source found       Past Surgical History   I have reviewed this patient's surgical history and updated it with pertinent information if needed.  Past Surgical History:   Procedure Laterality Date     C NONSPECIFIC PROCEDURE  1985    Diastasis recti repair     C NONSPECIFIC PROCEDURE  1987    Ventral hernia repair     C NONSPECIFIC PROCEDURE      ORIF of left shoulder     COLONOSCOPY  3/9/2013    Procedure: COLONOSCOPY;  COLONOSCOPY;  Surgeon: Chau Hogan MD;  Location:  GI     EYE SURGERY  03/13/2017    left eye     FOOT SURGERY  4/2013    cyst removal      HERNIA REPAIR  7/13/2004    ventral        Prior to Admission Medications   Prior to Admission Medications   Prescriptions Last Dose Informant Patient Reported? Taking?   Multiple Vitamins-Minerals (CENTRUM SILVER) per tablet 10/14/2018 at Unknown time  Yes Yes   Sig: Take 1 tablet by mouth daily.   aspirin 325 MG tablet 10/14/2018 at Unknown time  Yes Yes   Sig: Take 325 mg by mouth daily    blood glucose monitoring (ACCU-CHEK  "SMARTVIEW) test strip   No No   Sig: USE TO TEST TWICE DAILY   ferrous gluconate (FERGON) 324 (38 FE) MG tablet 10/14/2018 at Unknown time  Yes Yes   Sig: Take 1 tablet by mouth daily.   finasteride (PROSCAR) 5 MG tablet 10/14/2018 at Unknown time  Yes Yes   Sig: Take 5 mg by mouth daily.   fluticasone (FLONASE) 50 MCG/ACT spray 10/14/2018 at x1  Yes Yes   Sig: Spray 1 spray into both nostrils 2 times daily   lisinopril (PRINIVIL/ZESTRIL) 10 MG tablet 10/14/2018 at Unknown time  No Yes   Sig: TAKE 2 TABLETS (20 MG) BY MOUTH DAILY   loratadine (CLARITIN) 10 MG tablet   Yes No   Sig: Take 10 mg by mouth At Bedtime   metFORMIN (GLUCOPHAGE) 1000 MG tablet 10/14/2018 at Unknown time  No Yes   Sig: TAKE 1 TABLET (1,000 MG) BY MOUTH 2 TIMES DAILY (WITH MEALS) **DUE FOR APRIL APPT/LABS. CALL MD**   naproxen sodium (ALEVE) 220 MG capsule   Yes Yes   Sig: Take 220 mg by mouth as needed.   propylene glycol (SYSTANE BALANCE) 0.6 % SOLN 10/14/2018 at Unknown time  Yes Yes   Sig: Place 1 drop into both eyes every morning    tamsulosin (FLOMAX) 0.4 MG 24 hr capsule 10/14/2018 at Unknown time  Yes Yes   Sig: Take 1 capsule by mouth daily.      Facility-Administered Medications: None     Allergies   Allergies   Allergen Reactions     Penicillins      \"anaphylactic\"     Sulfa Drugs      \"itchy rash, swelling of face and hands\"       Social History   I have reviewed this patient's social history and updated it with pertinent information if needed.   Social History     Social History     Marital status:      Spouse name: N/A     Number of children: N/A     Years of education: N/A     Occupational History      Self     Social History Main Topics     Smoking status: Former Smoker     Quit date: 3/17/1993     Smokeless tobacco: Never Used     Alcohol use Yes      Comment: Occ, Once a month     Drug use: No     Sexual activity: No     Other Topics Concern     Not on file     Social History Narrative       Family History   I have " reviewed this patient's family history and updated it with pertinent information if needed.   Family History   Problem Relation Age of Onset     Family History Negative Mother       86 yo     Cancer Father       76 yo brain     Diabetes Maternal Grandfather       89 yo     Alcohol/Drug Paternal Grandfather             Review of Systems   The 10 point Review of Systems is negative other than noted in the HPI or here.     Physical Exam       Data   Data reviewed today:  I personally reviewed     Lab data and imaging results from today have been reviewed.       Recent Labs  Lab 10/15/18  0602 10/14/18  1443   WBC 17.1* 18.0*   HGB 10.2* 12.2*   MCV 94 92    323    137   POTASSIUM 4.0 4.2   CHLORIDE 107 104   CO2 21 26   BUN 10 18   CR 0.92 1.28*   ANIONGAP 9 7   SHANNAN 8.4* 9.4   * 134*       Recent Results (from the past 24 hour(s))   Foot XR, G/E 3 views, left    Narrative    LEFT FOOT THREE OR MORE VIEWS    10/14/2018 4:13 PM     HISTORY: Ulceration distal third toe, marked cellulitis, question  fracture or subcutaneous air.      COMPARISON: 2018.      Impression    IMPRESSION: No fracture. There is some cortical erosion and bony  irregularity along the distal third toe distal phalanx that could  represent osteomyelitis. This appears new since the prior examination.  There is soft tissue swelling in this area.       KEL SALEH MD   XR Tibia & Fibula Left 2 Views    Narrative    LEFT TIBIA AND FIBULA TWO VIEWS     10/14/2018 4:13 PM     HISTORY: Cellulitis, question subcutaneous air.    COMPARISON: None.      Impression    IMPRESSION: No fracture, no destructive-appearing bony lesion is seen.  Vascular calcifications. Detection of subcutaneous gas is limited and  not clearly evident. Small subcutaneous gas cannot be entirely  excluded.       MD LOVE MALONEY MD             D: 10/14/2018   T: 10/14/2018   MT: JOHN      Name:     PRIETOJULY  CURTIS   MRN:      [id number]        Account:      [id number]   :      1945        Admitted:     10/14/2018                   Document: [id number]

## 2018-10-14 NOTE — LETTER
Key Recommendations:  CTS identifies pt as high risk due to elevated RAMAKRISHNA and pt's 3rd admission for cellulitis in 6 months. Pt reports that he developed a wound on his toe when he started wearing new orthotics, this progressed to redness and swelling into his leg. He was treated with IV Cefipime and Vanco.  Orquidea Guy      Podiatry preformed 3 toe amputation and cleared the [place]** he did or did not discharge with abx**  Post operatively he develople Pulm. Edema and was ttreated with IV lasix and dsicharge with **  rec's for**     Estee Bunch           AVS/Discharge Summary is the source of truth; this is a helpful guide for improved communication of patient story

## 2018-10-14 NOTE — PHARMACY-VANCOMYCIN DOSING SERVICE
Pharmacy Vancomycin Initial Note  Date of Service 2018  Patient's  1945  73 year old, male    Indication: Skin and Soft Tissue Infection with possible Osteomyelitis    Current estimated CrCl = Estimated Creatinine Clearance: 66.7 mL/min (based on Cr of 1.28).    Creatinine for last 3 days  10/14/2018:  2:43 PM Creatinine 1.28 mg/dL    Recent Vancomycin Level(s) for last 3 days  No results found for requested labs within last 72 hours.      Vancomycin IV Administrations (past 72 hours)                   vancomycin (VANCOCIN) 1,500 mg in sodium chloride 0.9 % 250 mL intermittent infusion (mg) 1,500 mg New Bag 10/14/18 1525                Nephrotoxins and other renal medications (Future)    Start     Dose/Rate Route Frequency Ordered Stop    10/15/18 0900  lisinopril (PRINIVIL/ZESTRIL) tablet 20 mg      20 mg Oral DAILY 10/14/18 1730      10/15/18 0400  vancomycin (VANCOCIN) 2,000 mg in sodium chloride 0.9 % 500 mL intermittent infusion      2,000 mg  over 2 Hours Intravenous EVERY 12 HOURS 10/14/18 1745            Contrast Orders - past 72 hours     None                Plan:  1.  Start vancomycin  2000 mg IV q12h.  (1500mg IV x1 in ED around 1655  2.  Goal Trough Level: 15-20 mg/L   3.  Pharmacy will check trough levels as appropriate in 1-3 Days.    4. Serum creatinine levels will be ordered daily for the first week of therapy and at least twice weekly for subsequent weeks.    5. [place] method utilized to dose vancomycin therapy: Method 1    Lance Avila

## 2018-10-14 NOTE — IP AVS SNAPSHOT
MRN:[id number]                      After Visit Summary   10/14/2018    Lance Ibrahim    MRN: [id number]           Thank you!     Thank you for choosing [facility] for your care. Our goal is always to provide you with excellent care. Hearing back from our patients is one way we can continue to improve our services. Please take a few minutes to complete the written survey that you may receive in the mail after you visit. If you would like to speak to someone directly about your visit please contact Patient Relations at [phone]. Thank you!          Patient Information     Date Of Birth          1945        About your hospital stay     You were admitted on:  October 14, 2018 You last received care in the:  [facility]    You were discharged on:  October 22, 2018        Reason for your hospital stay       Please refer to discharge summary.  Patient admitted for diabetic foot infection.  Postoperatively patient developed gout and reacted to Ancef.  He is being discharged with treatment for gout and antibiotic for foot infection. now switched to minocycline.  Discharge to transitional care unit for rehab  We will need to monitor blood glucose closely as patient is diabetic and is on prednisone  Still requires supplemental O2 wean as able                  Who to Call     For medical emergencies, please call 911.  For non-urgent questions about your medical care, please call your primary care provider or clinic, [phone]  For questions related to your surgery, please call your surgery clinic        Attending Provider     Provider Specialty    Lance Delacruz MD Emergency Medicine    Jeromy, Madi Cameron MD Internal Medicine       Primary Care Provider Office Phone # Fax #    Anh Spivey [phone]      After Care Instructions     Activity       Your activity upon discharge: minimal heel weight bearing in post op boot left foot.             Activity - Up with nursing assistance           Advance Diet as Tolerated       Follow this diet upon discharge: Orders Placed This Encounter      Moderate Consistent CHO Diet            Fall precautions           General info for SNF       Length of Stay Estimate: Short Term Care: Estimated # of Days <30  Condition at Discharge: Improving  Level of care:skilled   Rehabilitation Potential: Good  Admission H&P remains valid and up-to-date: Yes  Recent Chemotherapy: N/A  Use Nursing Home Standing Orders: Yes            Glucose monitor nursing POCT       Before meals and at bedtime            Intake and output       Every shift            Mantoux instructions       Give two-step Mantoux (PPD) Per Facility Policy Yes            Oxygen - Nasal cannula       1-2 Lpm by nasal cannula to keep O2 sats 92% or greater.  Wean as able            Wound care (specify)       Site: Postop care  Instructions: As directed            Wound care and dressings       Instructions to care for your wound at home: keep foot and dressing dry. Will change at first clinic visit.                  Follow-up Appointments     Follow Up and recommended labs and tests       Follow up with [facility] physician.  The following labs/tests are recommended: CBC in 1-2 weeks  Follow-up with orthopedic surgery for bilateral osteoarthritis and new diagnosis of gout.  Follow-up with podiatry.  For post follow-up  Follow with primary care provider for diabetes management and monitor blood pressure and further evaluation for gout if necessary.  Wean supplemental O2  Monitor blood glucose especially while patient is on prednisone            Follow-up and recommended labs and tests        Follow up with Dr. Azevedo in 1 week from discharge from the hospital.  For APPOINTMENTS: [phone].  For questions or concerns: call: [phone]                  Your next 10 appointments already scheduled     Oct 23, 2018  2:40 PM CDT   Office Visit with Anh Pruitt  ELANA Spivey   [facility]    [address]  [facility] [id number] [phone]           Bring a current list of meds and any records pertaining to this visit. For Physicals, please bring immunization records and any forms needing to be filled out. Please arrive 10 minutes early to complete paperwork.              Additional Services     Occupational Therapy Adult Consult       Evaluate and treat as clinically indicated.    Reason: Deconditioning            Physical Therapy Adult Consult       Evaluate and treat as clinically indicated.    Reason: Postop                  Further instructions from your care team       A Hospital Discharge follow up with Dr. Anh Spivey has been scheduled for you on Tuesday October 23rd at 2:40pm     Pending Results     Date and Time Order Name Status Description    10/20/2018 1342 Fluid Culture Aerobic Bacterial Preliminary     10/20/2018 1342 Fluid Culture Aerobic Bacterial Preliminary     10/17/2018 1814 Blood culture Preliminary     10/17/2018 1814 Blood culture Preliminary             Statement of Approval     Ordered          10/22/18 1225  I have reviewed and agree with all the recommendations and orders detailed in this document.  EFFECTIVE NOW     Approved and electronically signed by:  Charlotte Pugh MD             Admission Information     Date & Time Provider Department Dept. Phone    10/14/2018 Madi Pinzon MD [facility] [phone]      Your Vitals Were     Blood Pressure Pulse Temperature Respirations Height Weight    126/71 109 95.3  F (35.2  C) 16 1.829 m (6') 113.4 kg (250 lb)    Pulse Oximetry BMI (Body Mass Index)                95% 33.91 kg/m2          Care EveryWhere ID     This is your Care EveryWhere ID. This could be used by other organizations to access your [facility] medical records  [id number]        Equal Access to Services     OTTONIEL BURGESS AH: Mike estevez  Sojudyali, waaxda luqadaha, qaybta kaalmada uma, ashley j carlosin hayaan sammilea alcirastar laHernandezjorgito yina. So [facility] [phone].    ATENCIÓN: Si tomás valle, tiene a kaiser disposición servicios gratuitos de asistencia lingüística. Llame al [phone].    We comply with applicable federal civil rights laws and [place] laws. We do not discriminate on the basis of race, color, national origin, age, disability, sex, sexual orientation, or gender identity.               Review of your medicines      START taking        Dose / Directions    carvedilol 3.125 MG tablet   Commonly known as:  COREG   Used for:  Essential hypertension        Dose:  3.125 mg   Take 1 tablet (3.125 mg) by mouth 2 times daily (with meals)   Quantity:  60 tablet   Refills:  0       diclofenac 1 % Gel topical gel   Commonly known as:  VOLTAREN   Used for:  Multiple joint pain        Dose:  2 g   Place 2 g onto the skin 4 times daily Leg and knee   Quantity:  1 Tube   Refills:  0       * gabapentin 100 MG capsule   Commonly known as:  NEURONTIN   Used for:  Neuropathy        Dose:  200 mg   Take 2 capsules (200 mg) by mouth At Bedtime   Quantity:  60 capsule   Refills:  0       * gabapentin 100 MG capsule   Commonly known as:  NEURONTIN   Used for:  Neuropathy        Dose:  100 mg   Start taking on:  10/23/2018   Take 1 capsule (100 mg) by mouth 2 times daily   Quantity:  60 capsule   Refills:  0       * insulin aspart 100 UNIT/ML injection   Commonly known as:  NovoLOG PEN   Used for:  Type 2 diabetes mellitus with hyperosmolarity without coma, unspecified whether long term insulin use (H)        Dose:  1-10 Units   Inject 1-10 Units Subcutaneous 3 times daily (before meals)   Refills:  0       * insulin aspart 100 UNIT/ML injection   Commonly known as:  NovoLOG PEN   Used for:  Type 2 diabetes mellitus with hyperosmolarity without coma, unspecified whether long term insulin use (H)        Dose:  1-7 Units   Inject 1-7 Units Subcutaneous At Bedtime    Refills:  0       lidocaine 4 % Crea cream   Commonly known as:  LMX4   Used for:  Acute gouty arthropathy        Apply topically every hour as needed for pain (with VAD insertion or accessing implanted port.)   Quantity:  1 Tube   Refills:  0       minocycline 100 MG capsule   Commonly known as:  MINOCIN/DYNACIN   Indication:  Infection of the Skin and/or Related Soft Tissue        Dose:  100 mg   Take 1 capsule (100 mg) by mouth every 12 hours for 9 days   Quantity:  18 capsule   Refills:  0       omeprazole 20 MG CR capsule   Commonly known as:  priLOSEC   Used for:  Acute superficial gastritis without hemorrhage        Dose:  20 mg   Start taking on:  10/23/2018   Take 1 capsule (20 mg) by mouth every morning (before breakfast)   Quantity:  30 capsule   Refills:  0       oxyCODONE IR 5 MG tablet   Commonly known as:  ROXICODONE   Used for:  Multiple joint pain        Dose:  2.5 mg   Take 0.5 tablets (2.5 mg) by mouth every 4 hours as needed for severe pain   Quantity:  6 tablet   Refills:  0       predniSONE 20 MG tablet   Commonly known as:  DELTASONE   Used for:  Acute gouty arthropathy        Dose:  40 mg   Start taking on:  10/23/2018   Take 2 tablets (40 mg) by mouth daily for 3 days   Quantity:  6 tablet   Refills:  0       traMADol 50 MG tablet   Commonly known as:  ULTRAM   Used for:  Multiple joint pain        Dose:  50 mg   Take 1 tablet (50 mg) by mouth every 6 hours as needed for moderate pain   Quantity:  10 tablet   Refills:  0       * Notice:  This list has 4 medication(s) that are the same as other medications prescribed for you. Read the directions carefully, and ask your doctor or other care provider to review them with you.      CONTINUE these medicines which have NOT CHANGED        Dose / Directions    aspirin 325 MG tablet        Dose:  325 mg   Take 325 mg by mouth daily   Refills:  0       blood glucose monitoring test strip   Commonly known as:  ACCU-CHEK SMARTVIEW   Used for:  Type 2  diabetes mellitus with chronic kidney disease, with long-term current use of insulin, unspecified CKD stage (H)        USE TO TEST TWICE DAILY   Quantity:  200 strip   Refills:  1       CENTRUM SILVER per tablet        Dose:  1 tablet   Take 1 tablet by mouth daily   Quantity:  30 tablet   Refills:  0       ferrous gluconate 324 (38 Fe) MG tablet   Commonly known as:  FERGON        Dose:  1 tablet   Take 1 tablet by mouth daily.   Refills:  0       finasteride 5 MG tablet   Commonly known as:  PROSCAR        Dose:  5 mg   Take 5 mg by mouth daily.   Refills:  0       fluticasone 50 MCG/ACT spray   Commonly known as:  FLONASE        Dose:  1 spray   Spray 1 spray into both nostrils 2 times daily   Refills:  0       lisinopril 10 MG tablet   Commonly known as:  PRINIVIL/ZESTRIL   Used for:  Essential hypertension        TAKE 2 TABLETS (20 MG) BY MOUTH DAILY   Quantity:  180 tablet   Refills:  3       loratadine 10 MG tablet   Commonly known as:  CLARITIN        Dose:  10 mg   Take 10 mg by mouth At Bedtime   Refills:  0       metFORMIN 1000 MG tablet   Commonly known as:  GLUCOPHAGE   Used for:  Type 2 diabetes mellitus without complication, without long-term current use of insulin (H)        TAKE 1 TABLET (1,000 MG) BY MOUTH 2 TIMES DAILY (WITH MEALS) **DUE FOR APRIL APPT/LABS. CALL MD**   Quantity:  180 tablet   Refills:  1       tamsulosin 0.4 MG capsule   Commonly known as:  FLOMAX        Dose:  1 capsule   Take 1 capsule by mouth daily.   Refills:  0         STOP taking     ALEVE 220 MG capsule   Generic drug:  naproxen sodium           SYSTANE BALANCE 0.6 % Soln ophthalmic solution   Generic drug:  propylene glycol                Where to get your medicines      These medications were sent to [facility] [address] [id number] [facility] [address]     Phone:  [phone]     carvedilol 3.125 MG tablet    CENTRUM SILVER per tablet    diclofenac 1 % Gel  topical gel    gabapentin 100 MG capsule    gabapentin 100 MG capsule    lidocaine 4 % Crea cream    minocycline 100 MG capsule    omeprazole 20 MG CR capsule    predniSONE 20 MG tablet         Some of these will need a paper prescription and others can be bought over the counter. Ask your nurse if you have questions.     Bring a paper prescription for each of these medications     oxyCODONE IR 5 MG tablet    traMADol 50 MG tablet       You don't need a prescription for these medications     insulin aspart 100 UNIT/ML injection    insulin aspart 100 UNIT/ML injection                Protect others around you: Learn how to safely use, store and throw away your medicines at www.disposemymeds.org.        ANTIBIOTIC INSTRUCTION     You've Been Prescribed an Antibiotic - Now What?  Your healthcare team thinks that you or your loved one might have an infection. Some infections can be treated with antibiotics, which are powerful, life-saving drugs. Like all medications, antibiotics have side effects and should only be used when necessary. There are some important things you should know about your antibiotic treatment.      Your healthcare team may run tests before you start taking an antibiotic.    Your team may take samples (e.g., from your blood, urine or other areas) to run tests to look for bacteria. These test can be important to determine if you need an antibiotic at all and, if you do, which antibiotic will work best.      Within a few days, your healthcare team might change or even stop your antibiotic.    Your team may start you on an antibiotic while they are working to find out what is making you sick.    Your team might change your antibiotic because test results show that a different antibiotic would be better to treat your infection.    In some cases, once your team has more information, they learn that you do not need an antibiotic at all. They may find out that you don't have an infection, or that the  antibiotic you're taking won't work against your infection. For example, an infection caused by a virus can't be treated with antibiotics. Staying on an antibiotic when you don't need it is more likely to be harmful than helpful.      You may experience side effects from your antibiotic.    Like all medications, antibiotics have side effects. Some of these can be serious.    Let you healthcare team know if you have any known allergies when you are admitted to the hospital.    One significant side effect of nearly all antibiotics is the risk of severe and sometimes deadly diarrhea caused by Clostridium difficile (C. Difficile). This occurs when a person takes antibiotics because some good germs are destroyed. Antibiotic use allows C. diificile to take over, putting patients at high risk for this serious infection.    As a patient or caregiver, it is important to understand your or your loved one's antibiotic treatment. It is especially important for caregivers to speak up when patients can't speak for themselves. Here are some important questions to ask your healthcare team.    What infection is this antibiotic treating and how do you know I have that infection?    What side effects might occur from this antibiotic?    How long will I need to take this antibiotic?    Is it safe to take this antibiotic with other medications or supplements (e.g., vitamins) that I am taking?     Are there any special directions I need to know about taking this antibiotic? For example, should I take it with food?    How will I be monitored to know whether my infection is responding to the antibiotic?    What tests may help to make sure the right antibiotic is prescribed for me?      Information provided by:  www.cdc.gov/getsmart  U.S. Department of Health and Human Services  Centers for disease Control and Prevention  National Center for Emerging and Zoonotic Infectious Diseases  Division of Healthcare Quality Promotion         Information about OPIOIDS     PRESCRIPTION OPIOIDS: WHAT YOU NEED TO KNOW   We gave you an opioid (narcotic) pain medicine. It is important to manage your pain, but opioids are not always the best choice. You should first try all the other options your care team gave you. Take this medicine for as short a time (and as few doses) as possible.    Some activities can increase your pain, such as bandage changes or therapy sessions. It may help to take your pain medicine 30 to 60 minutes before these activities. Reduce your stress by getting enough sleep, working on hobbies you enjoy and practicing relaxation or meditation. Talk to your care team about ways to manage your pain beyond prescription opioids.    These medicines have risks:    DO NOT drive when on new or higher doses of pain medicine. These medicines can affect your alertness and reaction times, and you could be arrested for driving under the influence (DUI). If you need to use opioids long-term, talk to your care team about driving.    DO NOT operate heavy machinery    DO NOT do any other dangerous activities while taking these medicines.    DO NOT drink any alcohol while taking these medicines.     If the opioid prescribed includes acetaminophen, DO NOT take with any other medicines that contain acetaminophen. Read all labels carefully. Look for the word  acetaminophen  or  Tylenol.  Ask your pharmacist if you have questions or are unsure.    You can get addicted to pain medicines, especially if you have a history of addiction (chemical, alcohol or substance dependence). Talk to your care team about ways to reduce this risk.    All opioids tend to cause constipation. Drink plenty of water and eat foods that have a lot of fiber, such as fruits, vegetables, prune juice, apple juice and high-fiber cereal. Take a laxative (Miralax, milk of magnesia, Colace, Senna) if you don t move your bowels at least every other day. Other side effects include upset  stomach, sleepiness, dizziness, throwing up, tolerance (needing more of the medicine to have the same effect), physical dependence and slowed breathing.    Store your pills in a secure place, locked if possible. We will not replace any lost or stolen medicine. If you don t finish your medicine, please throw away (dispose) as directed by your pharmacist. The [place] Pollution Control Agency has more information about safe disposal: https://www.pca.[facility].mn.us/living-green/managing-unwanted-medications             Medication List: This is a list of all your medications and when to take them. Check marks below indicate your daily home schedule. Keep this list as a reference.      Medications           Morning Afternoon Evening Bedtime As Needed    aspirin 325 MG tablet   Take 325 mg by mouth daily   Last time this was given:  325 mg on 10/22/2018  8:13 AM                                blood glucose monitoring test strip   Commonly known as:  ACCU-CHEK SMARTVIEW   USE TO TEST TWICE DAILY                                carvedilol 3.125 MG tablet   Commonly known as:  COREG   Take 1 tablet (3.125 mg) by mouth 2 times daily (with meals)   Last time this was given:  3.125 mg on 10/22/2018  8:13 AM                                CENTRUM SILVER per tablet   Take 1 tablet by mouth daily                                diclofenac 1 % Gel topical gel   Commonly known as:  VOLTAREN   Place 2 g onto the skin 4 times daily Leg and knee   Last time this was given:  2 g on 10/22/2018  4:22 PM                                ferrous gluconate 324 (38 Fe) MG tablet   Commonly known as:  FERGON   Take 1 tablet by mouth daily.                                finasteride 5 MG tablet   Commonly known as:  PROSCAR   Take 5 mg by mouth daily.   Last time this was given:  5 mg on 10/22/2018  8:52 AM                                fluticasone 50 MCG/ACT spray   Commonly known as:  FLONASE   Spray 1 spray into both nostrils 2 times daily                                 * gabapentin 100 MG capsule   Commonly known as:  NEURONTIN   Take 2 capsules (200 mg) by mouth At Bedtime   Last time this was given:  100 mg on 10/22/2018 11:33 AM                                * gabapentin 100 MG capsule   Commonly known as:  NEURONTIN   Take 1 capsule (100 mg) by mouth 2 times daily   Start taking on:  10/23/2018   Last time this was given:  100 mg on 10/22/2018 11:33 AM                                * insulin aspart 100 UNIT/ML injection   Commonly known as:  NovoLOG PEN   Inject 1-10 Units Subcutaneous 3 times daily (before meals)   Last time this was given:  1 Units on 10/22/2018  8:14 AM                                * insulin aspart 100 UNIT/ML injection   Commonly known as:  NovoLOG PEN   Inject 1-7 Units Subcutaneous At Bedtime   Last time this was given:  1 Units on 10/22/2018  8:14 AM                                lidocaine 4 % Crea cream   Commonly known as:  LMX4   Apply topically every hour as needed for pain (with VAD insertion or accessing implanted port.)                                lisinopril 10 MG tablet   Commonly known as:  PRINIVIL/ZESTRIL   TAKE 2 TABLETS (20 MG) BY MOUTH DAILY   Last time this was given:  20 mg on 10/22/2018  8:13 AM                                loratadine 10 MG tablet   Commonly known as:  CLARITIN   Take 10 mg by mouth At Bedtime   Last time this was given:  10 mg on 10/21/2018  7:59 PM                                metFORMIN 1000 MG tablet   Commonly known as:  GLUCOPHAGE   TAKE 1 TABLET (1,000 MG) BY MOUTH 2 TIMES DAILY (WITH MEALS) **DUE FOR APRIL APPT/LABS. CALL MD**   Last time this was given:  1,000 mg on 10/22/2018  8:14 AM                                minocycline 100 MG capsule   Commonly known as:  MINOCIN/DYNACIN   Take 1 capsule (100 mg) by mouth every 12 hours for 9 days   Last time this was given:  100 mg on 10/22/2018  8:13 AM                                omeprazole 20 MG CR capsule   Commonly known as:   priLOSEC   Take 1 capsule (20 mg) by mouth every morning (before breakfast)   Start taking on:  10/23/2018   Last time this was given:  20 mg on 10/22/2018 11:33 AM                                oxyCODONE IR 5 MG tablet   Commonly known as:  ROXICODONE   Take 0.5 tablets (2.5 mg) by mouth every 4 hours as needed for severe pain   Last time this was given:  2.5 mg on 10/22/2018  2:24 AM                                predniSONE 20 MG tablet   Commonly known as:  DELTASONE   Take 2 tablets (40 mg) by mouth daily for 3 days   Start taking on:  10/23/2018   Last time this was given:  40 mg on 10/22/2018  8:13 AM                                tamsulosin 0.4 MG capsule   Commonly known as:  FLOMAX   Take 1 capsule by mouth daily.   Last time this was given:  0.4 mg on 10/22/2018  8:14 AM                                traMADol 50 MG tablet   Commonly known as:  ULTRAM   Take 1 tablet (50 mg) by mouth every 6 hours as needed for moderate pain   Last time this was given:  50 mg on 10/22/2018 12:31 PM                                * Notice:  This list has 4 medication(s) that are the same as other medications prescribed for you. Read the directions carefully, and ask your doctor or other care provider to review them with you.

## 2018-10-14 NOTE — PHARMACY-ADMISSION MEDICATION HISTORY
Admission medication history interview status for this patient is complete. See [facility] admission navigator for allergy information, prior to admission medications and immunization status.     Medication history interview source(s):Patient  Medication history resources (including written lists, pill bottles, clinic record):None  Primary pharmacy:[facility] [address]    Changes made to PTA medication list:  Added: -  Deleted: gabapentin, probiotic  Changed: flonase to 1 spray BID    Actions taken by pharmacist (provider contacted, etc):None     Additional medication history information:None    Medication reconciliation/reorder completed by provider prior to medication history? No    Do you take OTC medications (eg tylenol, ibuprofen, fish oil, eye/ear drops, etc)? yes(Y/N)    For patients on insulin therapy: no (Y/N)  Lantus/levemir/NPH/Mix 70/30 dose:   (Y/N) (see Med list for doses)   Sliding scale Novolog Y/N  If Yes, do you have a baseline novolog pre-meal dose:  units with meals  Patients eat three meals a day:   Y/N    How many episodes of hypoglycemia do you have per week: _______  How many missed doses do you have per week: ______  How many times do you check your blood glucose per day: _______  Do you have a Continuous glucose monitor (CGM)   Y/N (remind pt that not approved for hospital use)   Any Barriers to therapy - Be specific :  cost of medications, comfortable with giving injections (if applicable), comfortable and confident with current diabetes regimen: Y/N ______________      Prior to Admission medications    Medication Sig Last Dose Taking? Auth Provider   aspirin 325 MG tablet Take 325 mg by mouth daily  10/14/2018 at Unknown time Yes Reported, Patient   ferrous gluconate (FERGON) 324 (38 FE) MG tablet Take 1 tablet by mouth daily. 10/14/2018 at Unknown time Yes Reported, Patient   finasteride (PROSCAR) 5 MG tablet Take 5 mg by mouth daily. 10/14/2018 at Unknown time Yes Reported, Patient   fluticasone  (FLONASE) 50 MCG/ACT spray Spray 1 spray into both nostrils 2 times daily 10/14/2018 at x1 Yes Unknown, Entered By History   lisinopril (PRINIVIL/ZESTRIL) 10 MG tablet TAKE 2 TABLETS (20 MG) BY MOUTH DAILY 10/14/2018 at Unknown time Yes Anh Spivey NP   metFORMIN (GLUCOPHAGE) 1000 MG tablet TAKE 1 TABLET (1,000 MG) BY MOUTH 2 TIMES DAILY (WITH MEALS) **DUE FOR APRIL APPT/LABS. CALL MD** 10/14/2018 at Unknown time Yes Anh Spivey NP   Multiple Vitamins-Minerals (CENTRUM SILVER) per tablet Take 1 tablet by mouth daily. 10/14/2018 at Unknown time Yes Reported, Patient   naproxen sodium (ALEVE) 220 MG capsule Take 220 mg by mouth as needed.  Yes Reported, Patient   propylene glycol (SYSTANE BALANCE) 0.6 % SOLN Place 1 drop into both eyes every morning  10/14/2018 at Unknown time Yes Reported, Patient   tamsulosin (FLOMAX) 0.4 MG 24 hr capsule Take 1 capsule by mouth daily. 10/14/2018 at Unknown time Yes Reported, Patient   blood glucose monitoring (ACCU-CHEK SMARTVIEW) test strip USE TO TEST TWICE DAILY   Temo Zendejas MD   loratadine (CLARITIN) 10 MG tablet Take 10 mg by mouth At Bedtime   Reported, Patient

## 2018-10-14 NOTE — ED TRIAGE NOTES
"Pt presents stating \"I think I have cellulitis on my right foot\" pt states he had cellulitis on right foot in June and July. First noticed redness and swelling last night. Pt reports nausea this morning. Has taken 2 ibuprofen for the pain this morning. Pt A&Ox4.  "

## 2018-10-14 NOTE — IP AVS SNAPSHOT
` ` Patient Information     Patient Name Sex     Lance Ibrahim ([id number]) Male 1945       Room Bed    [facility] 0649-01      Patient Demographics     Address Phone    [address] [place] [address] [phone] (Home)  none (Work)  [phone] (Mobile) *Preferred*      Patient Ethnicity & Race     Ethnic Group Patient Race    American White      Emergency Contact(s)     Name Relation Home Work Mobile    Monique Ireland Spouse [phone] none [phone]      Documents on File        Status Date Received Description       Documents for the Patient    Privacy Notice - [place] Received 03     Insurance Card  03     Face Sheet Received () 09     Insurance Card       Insurance Card  08     External Medication Information Consent       Patient ID Received 13     Consent for Services - Hospital/Clinic  ()      Consent for Services - Hospital/Clinic Received () 11/04/10     Insurance Card Received 11/04/10     External Medication Information Consent Accepted () 11/04/10     Consent for Services - Hospital/Clinic Received () 11     Insurance Card Received 11     External Medication Information Consent Accepted () 11 5350    HIM SHAY Authorization - File Only   [facility] Bariatric & General Surgery 10/3/12    External Medication Information Consent Accepted 12     Consent for Services - Hospital/Clinic Received () 12     Consent for EHR Access  13 Copied from existing Consent for services - C/HOD collected on 2012    Insurance Card Received 13     Consent for Services - Hospital/Clinic Received () 13     [facility] Specified Other       HIM SHAY Authorization - File Only  04/15/13 [facility]-12    HIM SHAY Authorization - File Only  13 [facility] BARIATRIC & GENERAL SURGERY-2012    HIM SHAY Authorization - File Only  05/15/13 [facility]  CENTER/NUTRIONAL COUNSELING-2012    Consent for EHR Access Received 13     Privacy Notice - [place] Received 13     Consent to Communicate Received 13     Consent for Services - Hospital/Clinic Received () 14     Insurance Card Received 02/17/15 HP    Consent for Services - Hospital/Clinic Received () 10/19/15     Consent for Services - Hospital/Clinic       Business/Insurance/Care Coordination/Health Form - Patient  12/21/15 CMS DENIAL, DIABETIC SUPPLIES-    Consent for Services/Privacy Notice - Hospital/Clinic Received () 16     Insurance Card Received 03/10/16     Business/Insurance/Care Coordination/Health Form - Patient  16 [facility]    Patient ID Received 10/22/16     Insurance Card Received 18 MEDICARE    Insurance Card Received 18 HP    Consent for Services/Privacy Notice - Hospital/Clinic Received () 05/15/17     Care Everywhere Prospective Auth Received 17     Consent for Services - Hospital and Clinic Received 18     HIE Auth Received 18     Insurance Card          Documents for the Encounter    CMS IM for Patient Signature Received 10/14/18     CMS IM for Patient Signature Received 10/22/18 2nd      Admission Information     Attending Provider Admitting Provider Admission Type Admission Date/Time    Madi Pinzon MD JeromyMadi maloney MD Emergency 10/14/18  1426    Discharge Date Hospital Service Auth/Cert Status Service Area     General Medicine [facility]    Unit Room/Bed Admission Status        [facility] 0649/0649-01 Admission (Confirmed)       Admission     Complaint    Diabetic foot infection (H), unknown      Hospital Account     Name Acct ID Class Status Primary Coverage    Lance Ibrahim [id number] Inpatient Open [facility] OPEN [facility]ESS            Guarantor Account (for Hospital Account #[id number])     Name Relation to Pt  Service Area Active? Acct Type    Lance Ibrahim  FCS Yes Personal/Family    Address Phone          [address] [place] [address] [phone](H)  none(O)              Coverage Information (for Hospital Account #[id number])     1. [employer]ERS/[employer]ERS OPEN ACCESS     F/O Payor/Plan Precert #    HE[employer]NERS/HEALTH[employer]NERS OPEN ACCESS     Subscriber Subscriber #    Lance Ibrahim [id number]    Address Phone    [address]  [place] [address] [phone]          2. MEDICARE/MEDICARE     F/O Payor/Plan Precert #    MEDICARE/MEDICARE     Subscriber Subscriber #    Lance Ibrahim [id number]    Address Phone    [employer]  [address]  [place] [address] [phone]

## 2018-10-14 NOTE — IP AVS SNAPSHOT
[facility]    [address]    [facility] [address]    Phone:  [phone]    Fax:  [phone]                                       After Visit Summary   10/14/2018    Lance Ibrahim    MRN: [id number]           After Visit Summary Signature Page     I have received my discharge instructions, and my questions have been answered. I have discussed any challenges I see with this plan with the nurse or doctor.    ..........................................................................................................................................  Patient/Patient Representative Signature      ..........................................................................................................................................  Patient Representative Print Name and Relationship to Patient    ..................................................               ................................................  Date                                   Time    ..........................................................................................................................................  Reviewed by Signature/Title    ...................................................              ..............................................  Date                                               Time          22EPIC Rev 08/18

## 2018-10-14 NOTE — PROGRESS NOTES
H&P to be dictated.     74 yo male with diabetic foot infection and worsening cellulitis associated with that. XR concerning for osteomyelitis of phalanx. Will cover with broad spectrum ABx, obtain MRI of left foot to evaluate further. ID and podiatry consult.

## 2018-10-14 NOTE — ED PROVIDER NOTES
History     Chief Complaint:  Leg Swelling & Redness      HPI   Lance Ibrahim is a 73 year old male with a history of hypertension, diabetes, stroke, and cellulitis, gout, and sepsis who presents to the ED for evaluation of leg swelling and erythema. The patient reports that he developed a wound on the distal aspect of his left third toe two days ago, which he attributes to some new orthotics. Yesterday, he noticed some left pedal swelling. This morning, the swelling had spread to his left calf and the area was erythematous and painful. This was associated with the onset of nausea and emesis, and the patient has not been able to tolerate any PO today. Here in the ED, the patient's symptoms have persisted and denies any fevers, shortness of breath, or toe pain, and he denies any history of neuropathy. He notes that his glucose levels have been at his baseline of 110-130 in the morning and 160-180 at night. Of note, the patient had two separate bouts of left leg cellulitis in May and June, at which point he was admitted for treatment.    Allergies:  Penicillins  Sulfa drugs    Medications:    Aspirin  Fergon  Proscar  Flonase  Gabapentin  Lisinopril  Claritin  Glucophage  Aleve  Probiotics  Systane balance  Flomax    Past Medical History:    Chronic rhinitis  Diabetes mellitus  Hypertension  Hypertrophy of prostate with urinary obstruction and other LUTS  TIA  Cellulitis  Gout  Sepsis  Cervicalgia  Obesity  Ventral hernia    Past Surgical History:    Diastasis recti repair  Ventral hernia repair  Left shoulder open reduction with internal fixation  Colonoscopy  Eye surgery  Cyst removal    Family History:    Brain cancer    Social History:  Former smoker  Alcohol use: yes  Marital Status:       Review of Systems   Constitutional: Negative for fever.   Respiratory: Negative for shortness of breath.    Cardiovascular: Positive for leg swelling.   Gastrointestinal: Positive for nausea and vomiting.    Musculoskeletal: Positive for joint swelling and myalgias.   All other systems reviewed and are negative.      Physical Exam     Patient Vitals for the past 24 hrs:   BP Temp Temp src Heart Rate Resp SpO2 Height Weight   10/14/18 1645 149/81 - - - - 98 % - -   10/14/18 1634 - 100.2  F (37.9  C) Oral - - - - -   10/14/18 1630 133/88 - - - - 100 % - -   10/14/18 1615 129/72 - - - - - - -   10/14/18 1545 - - - - - 97 % - -   10/14/18 1530 - - - - - 99 % - -   10/14/18 1515 (!) 165/92 - - - - 91 % - -   10/14/18 1500 153/77 - - - - 93 % - -   10/14/18 1457 - - - - - - 1.829 m (6') 116.6 kg (257 lb)   10/14/18 1451 - - - - - - - 116.6 kg (257 lb)   10/14/18 1445 154/78 - - - - 95 % - -   10/14/18 1431 - 99.1  F (37.3  C) Oral 116 18 97 % - -        Physical Exam  Constitutional: Alert, attentive, GCS 15  HENT:    Nose: Nose normal.    Mouth/Throat: Oropharynx is clear, mucous membranes are moist   Eyes: EOM are normal, anicteric, conjugate gaze  CV: regular rate and rhythm; no murmurs  Chest: Effort normal and breath sounds clear without wheezing or rales, symmetric bilaterally   GI:  non tender. No distension. No guarding or rebound.    MSK: Ulceration vs abrasion to tip of L 3rd digit with scaling/weeping on dorsum, no pain with palpation or flexion, no crepitus.    Neurological: Alert, attentive, moving all extremities equally.   Skin: Diffuse, blanching erythema over dorsum of L foot extending to mid-shin, non-tender.       Emergency Department Course   Imaging:  Radiographic findings were communicated with the patient who voiced understanding of the findings.  XR Foot 3 views, left:   No fracture. There is some cortical erosion and bony  irregularity along the distal third toe distal phalanx that could  represent osteomyelitis. This appears new since the prior examination.  There is soft tissue swelling in this area, as per radiology.      XR Tibia & Fibula, left, 2 views:   No fracture, no destructive-appearing  bony lesion is seen.  Vascular calcifications. Detection of subcutaneous gas is limited and  not clearly evident. Small subcutaneous gas cannot be entirely  excluded. As per radiology.     Laboratory:  CBC: WBC: 18.0 (H), HGB: 12.2 (L), PLT: 323  BMP: Glucose 134 (H), Creatinine (H), GFR 55 (L), o/w WNL    1443 Lactic acid: 2.0    Blood cultures (X2): pending     Interventions:  1459 NS 3L IV   1525 Vancocin 1500 mg IV  1528 Cleocin 900 mg IV  1627 Maxipime 1g IV  1649 Tylenol 975 mg PO   Benadryl 25 mg IV  1703 Vancocin 1500 mg intermittent infusion IV   Maxipime 1g intermittent infusion IV  Please see MAR for full list of medications administered in the ED.     Emergency Department Course:  Nursing notes and vitals reviewed. (1431) I performed an exam of the patient as documented above.     IV inserted. Medicine administered as documented above. Blood drawn. This was sent to the lab for further testing, results above.    The patient was sent for a foot x-ray and tibia and fibula x-ray while in the emergency department, findings above.     (1505)  I consulted with Dr. Pinzon of the hospitalist services. They are in agreement to accept the patient for admission.    (1618) I rechecked the patient and discussed the results of his workup thus far. At this time he was still stable and was not experiencing any shortness of breath.    (1634) The nurse noticed that the patient had developed some shivers and a red face after receiving Vancocin, and so I reevaluated him at this time and ordered further interventions.    (1654) I reevaluated the patient before he was taken off the floor, at which point he felt improved.    Findings and plan explained to the Patient who consents to admission. Discussed the patient with Dr. Pinzon, who will admit the patient to an adult med/surg bed for further monitoring, evaluation, and treatment.    Impression & Plan    Medical Decision Makin-year-old male with past medical history  significant for left lower extremity cellulitis, diabetes, prior CVA, hypertension presenting for evaluation of left lower leg and foot redness and swelling without associated significant pain but mild nausea without abdominal pain and chills without fever.  Vital signs on arrival notable for heart rate of 110s, pressure stable, afebrile.  On exam patient has significant erythema on the dorsum of his left foot extending up to mid shin with abrasion versus ulcer of the distal tip of his left third toe with scant weeping over the dorsum of his toe without significant pain with flexion or palpation, no crepitus.  Given rapid extent of the swelling and redness reported today certainly concern is for necrotizing fasciitis, says her cellulitis versus osteomyelitis.  Broad-spectrum antibiotics were ordered empirically, including vancomycin, cefepime due to history of penicillin allergy, clindamycin for toxin mediated reduction.  X-rays of the left foot were negative for subcutaneous air but did show small erosions of the distal phalanx of the left third digit concerning for osteomyelitis.  Lactate was within normal limits.  He does have market leukocytosis of 18 with a left shift concerning for significant infection.  He did get slightly flushing in the face with infusion of Vanco, this improved with 25 mg IV Benadryl and reducing the rate of infusion of his vancomycin.  Creatinine elevated 128 consistent with a.  He was given 30 cc/kg.  He continued to have mild tachycardia while in the emergency department but his blood pressure remained stable, pressors deferred.  Patient was admitted for close monitoring, continued IV antibiotics.  At this time suspect osteomyelitis with market cellulitis with lower suspicion for necrotizing fasciitis given negative subcutaneous air on x-rays no significant pain.    Diagnosis:    ICD-10-CM    1. Cellulitis and abscess of leg L03.119 CBC + differential    L02.419 Blood culture     Basic  metabolic panel (BMP)     Lactic acid       Disposition:  Admitted to Dr. Jeromy Delacruz MD   [employer]  5:39 PM 10/14/18     Scribe Disclosure:  I, Gemini Goel, am serving as a scribe on 10/14/2018 at 2:31 PM to personally document services performed by Lance Delacruz MD based on my observations and the provider's statements to me.      Gemini Goel  10/14/2018   [facility] EMERGENCY DEPARTMENT       Lance Delacruz MD  10/14/18 9670

## 2018-10-14 NOTE — IP AVS SNAPSHOT
` `           [facility] ORTHO SPINE: [phone]            Medication Administration Report for Lance Ibrahim as of 10/22/18 1631   Legend:    Given Hold Not Given Due Canceled Entry Other Actions    Time Time (Time) Time  Time-Action       Inactive    Active    Linked        Medications 10/16/18 10/17/18 10/18/18 10/19/18 10/20/18 10/21/18 10/22/18    acetaminophen (TYLENOL) tablet 650 mg  Dose: 650 mg  Freq: EVERY 4 HOURS PRN Route: PO  PRN Reason: mild pain  Start: 10/14/18 1730   Admin Instructions: Alternate ibuprofen (if ordered) with acetaminophen.  Maximum acetaminophen dose from all sources = 75 mg/kg/day not to exceed 4 grams/day.    Admin. Amount: 2 tablet (2 × 325 mg tablet)  Last Admin: 10/22/18 0833  Dispense Loc:  ADS MS6W     1749 (650 mg)-Given        0703 (650 mg)-Given       1757 (650 mg)-Given         1637 (650 mg)-Given        1213 (650 mg)-Given        0413 (650 mg)-Given       1233 (650 mg)-Given       1826 (650 mg)-Given       2219 (650 mg)-Given        0224 (650 mg)-Given       0833 (650 mg)-Given           aspirin tablet 325 mg  Dose: 325 mg  Freq: DAILY Route: PO  Start: 10/15/18 0800   Admin. Amount: 1 tablet (1 × 325 mg tablet)  Last Admin: 10/22/18 0813  Dispense Loc:  ADS MS6W     0809 (325 mg)-Given        0826 (325 mg)-Given        0848 (325 mg)-Given        0818 (325 mg)-Given        0837 (325 mg)-Given        0847 (325 mg)-Given        0813 (325 mg)-Given           carvedilol (COREG) tablet 3.125 mg  Dose: 3.125 mg  Freq: 2 TIMES DAILY WITH MEALS Route: PO  Start: 10/19/18 0800   Admin. Amount: 1 tablet (1 × 3.125 mg tablet)  Last Admin: 10/22/18 0813  Dispense Loc:  ADS MS6[facility]        0818 (3.125 mg)-Given       1843 (3.125 mg)-Given        0836 (3.125 mg)-Given       1754 (3.125 mg)-Given        0847 (3.125 mg)-Given       1703 (3.125 mg)-Given        0813 (3.125 mg)-Given       [ ] 1800           diclofenac (VOLTAREN) 1 % topical gel 2 g  Dose: 2 g  Freq: 4 TIMES  DAILY Route: TD  Start: 10/17/18 1700   Admin Instructions: Apply to knees  Send dosing card with product.    Admin. Amount: 2 g  Last Admin: 10/22/18 1622  Dispense Loc:  Main Pharmacy      (1900)-Not Given       (2116)-Not Given        0916 (2 g)-Given       1301 (2 g)-Given       1707 (2 g)-Given       2120 (2 g)-Given        0824 (2 g)-Given       1235 (2 g)-Given       1629 (2 g)-Given       2125 (2 g)-Given        0837 (2 g)-Given       1205 (2 g)-Given       1753 (2 g)-Given       2015 (2 g)-Given [C]        0849 (2 g)-Given       1234 (2 g)-Given       1703 (2 g)-Given       1959 (2 g)-Given        0817 (2 g)-Given       1133 (2 g)-Given       1622 (2 g)-Given       [ ] 2000           diphenhydrAMINE (BENADRYL) capsule 25 mg  Dose: 25 mg  Freq: EVERY 6 HOURS PRN Route: PO  PRN Reason: itching  Start: 10/21/18 1340   Admin. Amount: 1 capsule (1 × 25 mg capsule)  Last Admin: 10/21/18 2219  Dispense Loc:  ADS MS[facility]          1417 (25 mg)-Given       2219 (25 mg)-Given            enoxaparin (LOVENOX) injection 40 mg  Dose: 40 mg  Freq: EVERY 24 HOURS Route: SC  Start: 10/17/18 2000   Admin. Amount: 40 mg = 0.4 mL Conc: 40 mg/0.4 mL  Last Admin: 10/21/18 1959  Dispense Loc:  ADS MS6[facility]  Volume: 0.4 mL      2044 (40 mg)-Given        2119 (40 mg)-Given        2125 (40 mg)-Given        2015 (40 mg)-Given        1959 (40 mg)-Given        [ ] 2000           finasteride (PROSCAR) tablet 5 mg  Dose: 5 mg  Freq: DAILY Route: PO  Start: 10/15/18 0800   Admin Instructions: *Do not handle tablets if you are pregnant*    Admin. Amount: 1 tablet (1 × 5 mg tablet)  Last Admin: 10/22/18 0852  Dispense Loc: [facility] ADS MS[facility]     0809 (5 mg)-Given        0827 (5 mg)-Given        0848 (5 mg)-Given        0818 (5 mg)-Given        0934 (5 mg)-Given        0910 (5 mg)-Given        0852 (5 mg)-Given           gabapentin (NEURONTIN) capsule 100 mg  Dose: 100 mg  Freq: 2 TIMES DAILY Route: PO  Start: 10/18/18 0800   Admin. Amount: 1 capsule  (1 × 100 mg capsule)  Last Admin: 10/22/18 1133  Dispense Loc: [facility]       0848 (100 mg)-Given       1301 (100 mg)-Given        0818 (100 mg)-Given       1235 (100 mg)-Given        0837 (100 mg)-Given       1204 (100 mg)-Given        0847 (100 mg)-Given       1234 (100 mg)-Given        0813 (100 mg)-Given       1133 (100 mg)-Given           gabapentin (NEURONTIN) capsule 200 mg  Dose: 200 mg  Freq: AT BEDTIME Route: PO  Start: 10/17/18 2200   Admin. Amount: 2 capsule (2 × 100 mg capsule)  Last Admin: 10/21/18 1958  Dispense Loc: [facility] MS6[facility]      2140 (200 mg)-Given        2119 (200 mg)-Given        2125 (200 mg)-Given        2207 (200 mg)-Given        1958 (200 mg)-Given               [ ] 2200           glucose gel 15-30 g  Dose: 15-30 g  Freq: EVERY 15 MIN PRN Route: PO  PRN Reason: low blood sugar  Start: 10/14/18 1730   Admin Instructions: Give 15 g for BG 51 to 69 mg/dL IF patient is conscious and able to swallow. Give 30 g for BG less than or equal to 50 mg/dL IF patient is conscious and able to swallow. Do NOT give glucose gel via enteral tube.  IF patient has enteral tube: give apple juice 120 mL (4 oz or 15 g of CHO) via enteral tube for BG 51 to 69 mg/dL.  Give apple juice 240 mL (8 oz or 30 g of CHO) via enteral tube for BG less than or equal to 50 mg/dL.    ~Oral gel is preferable for conscious and able to swallow patient.   ~IF gel unavailable or patient refuses may provide apple juice 120 mL (4 oz or 15 g of CHO). Document juice on I and O flowsheet.    Admin. Amount: 15-30 g  Dispense Loc: [facility] ADS MS6[facility]  Volume: 93.75 mL              Or  dextrose 50 % injection 25-50 mL  Dose: 25-50 mL  Freq: EVERY 15 MIN PRN Route: IV  PRN Reason: low blood sugar  Start: 10/14/18 1730   Admin Instructions: Use if have IV access, BG less than 70 mg/dL and meet dose criteria below:  Dose if conscious and alert (or disorientated) and NPO = 25 mL  Dose if unconscious / not alert = 50 mL  Vesicant. For ordered doses up  to 25 g, give IV Push undiluted. Give each 5g over 1 minute.    Admin. Amount: 25-50 mL  Dispense Loc: [facility] ADS MS6[facility]  Infused Over: 1-5 Minutes  Volume: 50 mL              Or  glucagon injection 1 mg  Dose: 1 mg  Freq: EVERY 15 MIN PRN Route: SC  PRN Reason: low blood sugar  PRN Comment: May repeat x 1 only  Start: 10/14/18 1730   Admin Instructions: May give SQ or IM. ONLY use glucagon IF patient has NO IV access AND is UNABLE to swallow AND blood glucose is LESS than or EQUAL to 50 mg/dL.  If ordered IV, give IV Push over 1 minute. Reconstitute with 1mL sterile water.    Admin. Amount: 1 mg  Dispense Loc: [facility] ADS MS6[facility]               hydrALAZINE (APRESOLINE) injection 10 mg  Dose: 10 mg  Freq: EVERY 4 HOURS PRN Route: IV  PRN Reason: high blood pressure  PRN Comment: give for SBP > 180  Start: 10/16/18 0333   Admin Instructions: For ordered IV doses 1-40 mg, give IV Push undiluted over 1 minute.    Admin. Amount: 10 mg = 0.5 mL Conc: 20 mg/mL  Last Admin: 10/16/18 0343  Dispense Loc: [facility] ADS MS6[facility]  Volume: 1 mL     0343 (10 mg)-Given                 hypromellose-dextran (ARTIFICAL TEARS) 0.1-0.3 % ophthalmic solution 1 drop  Dose: 1 drop  Freq: EVERY MORNING Route: Both Eyes  Indications Comment: Thera tears  Start: 10/15/18 0800   Admin. Amount: 1 drop  Last Admin: 10/17/18 0828  Dispense Loc: [facility]  Volume: 15 mL     (0807)-Not Given        0828 (1 drop)-Given        (0859)-Not Given        (0826)-Not Given        (0838)-Not Given        (0847)-Not Given        (0819)-Not Given           insulin aspart (NovoLOG) inj (RAPID ACTING)  Dose: 1-7 Units  Freq: AT BEDTIME Route: SC  Start: 10/14/18 2200   Admin Instructions: HIGH INSULIN RESISTANCE DOSING    Do Not give Bedtime Correction Insulin if BG less than 200.   For  - 224 give 1 units.   For  - 249 give 2 units.   For  - 274 give 3 units.   For  - 299 give 4 units.   For  - 324 give 5 units.   For  - 349 give 6 units.    For BG greater than or equal to 350 give 7 units.   Notify provider if glucose greater than or equal to 350 mg/dL after administration of correction dose.  If given at mealtime, administer within 30 minutes of start of meal    Admin. Amount: 1-7 Units  Last Admin: 10/21/18 2221  Dispense Loc: Contact Rx for dose  Volume: 3 mL     (2157)-Not Given [C]        2140 (3 Units)-Given [C]        (2151)-Not Given [C]        2206 (2 Units)-Given [C]        (2208)-Not Given        2221 (3 Units)-Given [C]        [ ] 2200           insulin aspart (NovoLOG) inj (RAPID ACTING)  Dose: 1-10 Units  Freq: 3 TIMES DAILY BEFORE MEALS Route: SC  Start: 10/14/18 1745   Admin Instructions: Correction Scale - HIGH INSULIN RESISTANCE DOSING     Do Not give Correction Insulin if Pre-Meal BG less than 140.   For Pre-Meal  - 164 give 1 unit.   For Pre-Meal  - 189 give 2 units.   For Pre-Meal  - 214 give 3 units.   For Pre-Meal  - 239 give 4 units.   For Pre-Meal  - 264 give 5 units.   For Pre-Meal  - 289 give 6 units.   For Pre-Meal  - 314 give 7 units.   For Pre-Meal  - 339 give 8 units.   For Pre-Meal  - 364 give 9 units.   For Pre-Meal BG greater than or equal to 365 give 10 units  To be given with prandial insulin, and based on pre-meal blood glucose.   Notify provider if glucose greater than or equal to 350 mg/dL after administration of correction dose.  If given at mealtime, administer within 30 minutes of start of meal    Admin. Amount: 1-10 Units  Last Admin: 10/22/18 0814  Dispense Loc: Contact Rx for dose  Volume: 3 mL     0749 (1 Units)-Given [C]       (1248)-Not Given       1807 (1 Units)-Given [C]        (0828)-Not Given [C]       1254 (2 Units)-Given [C]       (1803)-Not Given [C]        (0901)-Not Given [C]       1301 (1 Units)-Given [C]       1707 (1 Units)-Given [C]        (0737)-Not Given [C]       1244 (2 Units)-Given       1849 (1 Units)-Given [C]        (4031)-Not  "Given       (1205)-Not Given [C]       1753 (1 Units)-Given        0847 (1 Units)-Given       1234 (1 Units)-Given       (1707)-Not Given [C]        0814 (1 Units)-Given       (1231)-Not Given [C]       [ ] 1700           lidocaine (LMX4) cream  Freq: EVERY 1 HOUR PRN Route: Top  PRN Reason: pain  PRN Comment: with VAD insertion or accessing implanted port.  Start: 10/14/18 1730   Admin Instructions: Do NOT give if patient has a history of allergy to any local anesthetic or any \"ysabel\" product.   Apply 30 minutes prior to VAD insertion or port access.  MAX Dose:  2.5 g (  of 5 g tube)    Dispense Loc: [facility] ADS MS6W               lidocaine 1 % 1 mL  Dose: 1 mL  Freq: EVERY 1 HOUR PRN Route: OTHER  PRN Comment: mild pain with VAD insertion or accessing implanted port  Start: 10/14/18 1730   Admin Instructions: Do NOT give if patient has a history of allergy to any local anesthetic or any \"ysabel\" product. MAX dose 1 mL subcutaneous OR intradermal in divided doses.    Admin. Amount: 1 mL  Dispense Loc: [facility] Floor Stock  Volume: 2 mL               lisinopril (PRINIVIL/ZESTRIL) tablet 20 mg  Dose: 20 mg  Freq: DAILY Route: PO  Start: 10/15/18 0900   Admin Instructions: Hold for SBP < 110    Admin. Amount: 1 tablet (1 × 20 mg tablet)  Last Admin: 10/22/18 0813  Dispense Loc: [facility] ADS MS6W     0809 (20 mg)-Given        0827 (20 mg)-Given        0848 (20 mg)-Given        0818 (20 mg)-Given        0836 (20 mg)-Given        0847 (20 mg)-Given        0813 (20 mg)-Given           loratadine (CLARITIN) tablet 10 mg  Dose: 10 mg  Freq: AT BEDTIME Route: PO  Start: 10/14/18 2200   Admin. Amount: 1 tablet (1 × 10 mg tablet)  Last Admin: 10/21/18 1959  Dispense Loc: [facility] ADS MS6W     2200 (10 mg)-Given        2140 (10 mg)-Given        2119 (10 mg)-Given        2125 (10 mg)-Given        2207 (10 mg)-Given        1959 (10 mg)-Given               [ ] 2200           melatonin tablet 1 mg  Dose: 1 mg  Freq: AT BEDTIME PRN Route: PO  PRN Reason: " sleep  Start: 10/14/18 1730   Admin Instructions: Do not give unless at least 6 hours of uninterrupted sleep is expected.    Admin. Amount: 1 tablet (1 × 1 mg tablet)  Dispense Loc: [facility] ADS MS6[facility]               metFORMIN (GLUCOPHAGE) tablet 1,000 mg  Dose: 1,000 mg  Freq: 2 TIMES DAILY WITH MEALS Route: PO  Start: 10/19/18 0800   Admin Instructions: If the patient receives intravenous, iodinated contrast and patient GFR is greater than 60 mL/min/1.7m2, continue metformin.  Contact provider for 'hold' or 'no hold' instructions if no GFR or if GFR is less than 60 mL/min/1.7m2.    Admin. Amount: 2 tablet (2 × 500 mg tablet)  Last Admin: 10/22/18 0814  Dispense Loc: [facility] ADS MS6W        0818 (1,000 mg)-Given       1843 (1,000 mg)-Given        0836 (1,000 mg)-Given       1754 (1,000 mg)-Given        0846 (1,000 mg)-Given       1703 (1,000 mg)-Given        0814 (1,000 mg)-Given       [ ] 1800           minocycline (MINOCIN/DYNACIN) capsule 100 mg  Dose: 100 mg  Freq: EVERY 12 HOURS SCHEDULED Route: PO  Indications of Use: SKIN AND SOFT TISSUE INFECTION  Start: 10/21/18 1030   Admin Instructions: Administer at least 2 hours before or after aluminum, calcium, iron, zinc or magnesium containing products.    Admin. Amount: 1 capsule (1 × 100 mg capsule)  Last Admin: 10/22/18 0813  Dispense Loc: [facility] ADS MS6W          1120 (100 mg)-Given       1958 (100 mg)-Given        0813 (100 mg)-Given       [ ] 2000           naloxone (NARCAN) injection 0.1-0.4 mg  Dose: 0.1-0.4 mg  Freq: EVERY 2 MIN PRN Route: IV  PRN Reason: opioid reversal  Start: 10/14/18 1730   Admin Instructions: For respiratory rate LESS than or EQUAL to 8.  Partial reversal dose:  0.1 mg titrated q 2 minutes for Analgesia Side Effects Monitoring Sedation Level of 3 (frequently drowsy, arousable, drifts to sleep during conversation).Full reversal dose:  0.4 mg bolus for Analgesia Side Effects Monitoring Sedation Level of 4 (somnolent, minimal or no response to  stimulation).  For ordered IV doses 0.1-2mg give IVP. Give each 0.4mg over 15 seconds in emergency situations. For non-emergent situations further dilute in 9mL of NS to facilitate titration of response.    Admin. Amount: 0.1-0.4 mg = 0.25-1 mL Conc: 0.4 mg/mL  Dispense Loc: [facility] ADS MS6[facility]  Volume: 1 mL               omeprazole (priLOSEC) CR capsule 20 mg  Dose: 20 mg  Freq: EVERY MORNING BEFORE BREAKFAST Route: PO  Start: 10/22/18 1130   Admin. Amount: 1 capsule (1 × 20 mg capsule)  Last Admin: 10/22/18 1133  Dispense Loc: [facility] ADS MS6W           1133 (20 mg)-Given           ondansetron (ZOFRAN-ODT) ODT tab 4 mg  Dose: 4 mg  Freq: EVERY 6 HOURS PRN Route: PO  PRN Reasons: nausea,vomiting  Start: 10/14/18 1730   Admin Instructions: This is Step 1 of nausea and vomiting management.  If nausea not resolved in 15 minutes, go to Step 2 prochlorperazine (COMPAZINE). Do not push through foil backing. Peel back foil and gently remove. Place on tongue immediately. Administration with liquid unnecessary  With dry hands, peel back foil backing and gently remove tablet; do not push oral disintegrating tablet through foil backing; administer immediately on tongue and oral disintegrating tablet dissolves in seconds; then swallow with saliva; liquid not required.    Admin. Amount: 1 tablet (1 × 4 mg tablet)  Dispense Loc: [facility] ADS MS6[facility]              Or  ondansetron (ZOFRAN) injection 4 mg  Dose: 4 mg  Freq: EVERY 6 HOURS PRN Route: IV  PRN Reasons: nausea,vomiting  Start: 10/14/18 1730   Admin Instructions: This is Step 1 of nausea and vomiting management.  If nausea not resolved in 15 minutes, go to Step 2 prochlorperazine (COMPAZINE).  Irritant. For ordered IV doses 0.1-4 mg, give IV Push undiluted over 2-5 minutes.    Admin. Amount: 4 mg = 2 mL Conc: 4 mg/2 mL  Last Admin: 10/15/18 2709  Dispense Loc: [facility] ADS MS6[facility]  Infused Over: 2-5 Minutes  Volume: 2 mL               oxyCODONE IR (ROXICODONE) half-tab 2.5 mg  Dose: 2.5 mg  Freq: EVERY  4 HOURS PRN Route: PO  PRN Reason: severe pain  Start: 10/17/18 1702   Admin. Amount: 1 half-tab (1 × 2.5 mg half-tab)  Last Admin: 10/22/18 0224  Dispense Loc: [facility] ADS MS6[facility]      1753 (2.5 mg)-Given        0700 (2.5 mg)-Given        2159 (2.5 mg)-Given        0502 (2.5 mg)-Given       1022 (2.5 mg)-Given       1504 (2.5 mg)-Given       2345 (2.5 mg)-Given        0413 (2.5 mg)-Given       0856 (2.5 mg)-Given       1343 (2.5 mg)-Given       1826 (2.5 mg)-Given       2219 (2.5 mg)-Given        0224 (2.5 mg)-Given           polyethylene glycol (MIRALAX/GLYCOLAX) Packet 17 g  Dose: 17 g  Freq: DAILY PRN Route: PO  PRN Reason: constipation  Start: 10/14/18 1730   Admin Instructions: Give in 8oz of  water, juice, or soda. Hold for loose stools.  This is the second step of a three step constipation treatment protocol.  1 Packet = 17 grams. Mixed prescribed dose in 8 ounces of water. Follow with 8 oz. of water.    Admin. Amount: 17 g  Dispense Loc: [facility] ADS MS6W               predniSONE (DELTASONE) tablet 40 mg  Dose: 40 mg  Freq: DAILY Route: PO  Start: 10/21/18 1345   End: 10/26/18 0759   Admin. Amount: 2 tablet (2 × 20 mg tablet)  Last Admin: 10/22/18 0813  Dispense Loc: [facility] ADS MS6W  Administrations Remaining: 3          1417 (40 mg)-Given        0813 (40 mg)-Given           senna-docusate (SENOKOT-S;PERICOLACE) 8.6-50 MG per tablet 1 tablet  Dose: 1 tablet  Freq: 2 TIMES DAILY PRN Route: PO  PRN Reason: constipation  Start: 10/14/18 1730   Admin Instructions: If no bowel movement in 24 hours, increase to 2 tablets PO.  Hold for loose stools.  This is the first step of a three step constipation treatment.    Admin. Amount: 1 tablet  Dispense Loc: [facility] ADS MS6W              Or  senna-docusate (SENOKOT-S;PERICOLACE) 8.6-50 MG per tablet 2 tablet  Dose: 2 tablet  Freq: 2 TIMES DAILY PRN Route: PO  PRN Reason: constipation  Start: 10/14/18 1730   Admin Instructions: Hold for loose stools.  This is the first step of a three step  constipation treatment.    Admin. Amount: 2 tablet  Dispense Loc: [facility] ADS MS6W               sodium chloride (PF) 0.9% PF flush 3 mL  Dose: 3 mL  Freq: EVERY 8 HOURS Route: IK  Start: 10/14/18 1745   Admin Instructions: And Q1H PRN, to lock peripheral IV dormant line.    Admin. Amount: 3 mL  Last Admin: 10/22/18 0822  Dispense Loc: [facility] Floor Stock  Volume: 4 mL     0319 (3 mL)-Given       0926 (3 mL)-Given       1541 (3 mL)-Given               (0833)-Not Given       (1707)-Not Given        0013 (3 mL)-Given       0903 (3 mL)-Given               0019 (3 mL)-Given       0818 (3 mL)-Given       1630 (3 mL)-Given        (0139)-Not Given [C]       0838 (3 mL)-Given       1759 (3 mL)-Given        0408 (3 mL)-Given       0910 (3 mL)-Given       1707 (3 mL)-Given        0211 (3 mL)-Given       0822 (3 mL)-Given       (1547)-Not Given           sodium chloride (PF) 0.9% PF flush 3 mL  Dose: 3 mL  Freq: EVERY 1 HOUR PRN Route: IK  PRN Reason: line flush  PRN Comment: for peripheral IV flush post IV meds  Start: 10/14/18 1730   Admin. Amount: 3 mL  Last Admin: 10/19/18 2214  Dispense Loc: [facility] Floor Stock  Volume: 4 mL        2214 (3 mL)-Given [C]              tamsulosin (FLOMAX) capsule 0.4 mg  Dose: 0.4 mg  Freq: DAILY Route: PO  Start: 10/15/18 0800   Admin Instructions: Administer 30 minutes after the same meal each day.  Capsules should be swallowed whole; do not crush chew or open.    Admin. Amount: 1 capsule (1 × 0.4 mg capsule)  Last Admin: 10/22/18 0814  Dispense Loc: [facility] ADS MS6W     0809 (0.4 mg)-Given        0826 (0.4 mg)-Given        0902 (0.4 mg)-Given        0818 (0.4 mg)-Given        0837 (0.4 mg)-Given        0847 (0.4 mg)-Given        0814 (0.4 mg)-Given           traMADol (ULTRAM) tablet 50 mg  Dose: 50 mg  Freq: EVERY 6 HOURS PRN Route: PO  PRN Reason: moderate pain  Start: 10/17/18 1702   Admin. Amount: 1 tablet (1 × 50 mg tablet)  Last Admin: 10/22/18 1231  Dispense Loc:  ADS MS6W       0013 (50  mg)-Given        0018 (50 mg)-Given       0818 (50 mg)-Given       1415 (50 mg)-Given          0556 (50 mg)-Given       1231 (50 mg)-Given          Completed Medications  Medications 10/16/18 10/17/18 10/18/18 10/19/18 10/20/18 10/21/18 10/22/18         Dose: 500 mL  Freq: ONCE Route: IV  Last Dose: 500 mL (10/20/18 09)  Start: 10/20/18 0915   End: 10/20/18 1020   Admin. Amount: 500 mL  Last Admin: 10/20/18 0920  Dispense Loc: [facility] Floor Stock  Infused Over: 1 Hours  Administrations Remainin  Volume: 500 mL         0920 (500 mL)-New Bag            Discontinued Medications  Medications 10/16/18 10/17/18 10/18/18 10/19/18 10/20/18 10/21/18 10/22/18         Dose: 2 g  Freq: EVERY 8 HOURS Route: IV  Indications of Use: SKIN AND SOFT TISSUE INFECTION  Start: 10/18/18 1430   End: 10/21/18 1016   Admin. Amount: 2 g = 100 mL Conc: 2 g/100 mL  Last Admin: 10/21/18 0407  Dispense Loc:  Main Pharmacy  Infused Over: 30 Minutes  Volume: 100 mL       1801 (2 g)-Given        0404 (2 g)-Given       1235 (2 g)-Given       2124 (2 g)-Given        0502 (2 g)-Given       1204 (2 g)-Given       2015 (2 g)-Given        0407 (2 g)-Given       1016-Med Discontinued          Dose: 40 mg  Freq: DAILY Route: PO  Start: 10/19/18 0800   End: 10/20/18 0911   Admin. Amount: 1 tablet (1 × 40 mg tablet)  Last Admin: 10/20/18 0836  Dispense Loc:  ADS MS6W        0818 (40 mg)-Given        0836 (40 mg)-Given       0911-Med Discontinued

## 2018-10-14 NOTE — ED NOTES
"Pt became flushed in the face, and began complaining of \"chills.\" Dr. Delacruz assessed and tylenol and benadryl ordered per potential Vancomycin reaction. Vancomycin rate decreased. See MAR.  "

## 2018-10-14 NOTE — IP AVS SNAPSHOT
[facility] ORTHO SPINE: [phone]                                              INTERAGENCY TRANSFER FORM - PHYSICIAN ORDERS   10/14/2018                    Hospital Admission Date: 10/14/2018  CURTIS SAHU   : 1945  Sex: Male        Attending Provider: Madi Pinzon MD     Allergies:  Penicillins, Sulfa Drugs, Ancef [Cefazolin]    Infection:  None   Service:  GENERAL MEDI    Ht:  1.829 m (6')   Wt:  118.2 kg (260 lb 9.6 oz)   Admission Wt:  116.6 kg (257 lb)    BMI:  35.34 kg/m 2   BSA:  2.45 m 2            Patient PCP Information     Provider PCP Type    Anh Spivey NP General      ED Clinical Impression     Diagnosis Description Comment Added By Time Added    Cellulitis and abscess of leg [L03.119, L02.419] Cellulitis and abscess of leg [L03.119, L02.419]  Curtis Delacruz MD 10/14/2018  2:55 PM      Hospital Problems as of 10/22/2018              Priority Class Noted POA    Diabetic foot infection (H) Medium  10/14/2018 Yes      Non-Hospital Problems as of 10/22/2018              Priority Class Noted    Ventral hernia Medium  2004    Transient cerebral ischemia High  2004    Hypertrophy of prostate with urinary obstruction Medium  2004    Chronic rhinitis Low  2004    HTN (hypertension) High  2008    CARDIOVASCULAR SCREENING; LDL GOAL LESS THAN 100 Medium  10/31/2010    Gout Low  2012    Type 2 diabetes, HbA1c goal < 7% (H) High  2013    Obesity Medium  10/14/2015    Diabetes mellitus type 2, uncomplicated (H) Medium  10/14/2015    Benign essential hypertension Medium  2016    Cervicalgia Medium  2017    Sepsis (H) Medium  2018    Cellulitis Medium  2018      Code Status History     Date Active Date Inactive Code Status Order ID Comments User Context    10/22/2018 12:25 PM  Full Code [id number]  Charlotte Pugh MD Outpatient    10/14/2018  5:30 PM 10/22/2018 12:25 PM Full Code [id number]  Madi Pinzon MD  Inpatient    6/21/2018  3:34 PM 10/14/2018  5:30 PM Full Code [id number]  Jhonny Wu III, MD Outpatient    6/19/2018  8:26 PM 6/21/2018  3:34 PM Full Code [id number]  Casandra Dill MD Inpatient    5/4/2018  7:27 AM 6/19/2018  8:26 PM Full Code [id number]  Vidal Newman MD Outpatient    5/1/2018  7:21 PM 5/4/2018  7:27 AM Full Code [id number]  Jagjit Dickerson MD Inpatient         Medication Review      START taking        Dose / Directions Comments    carvedilol 3.125 MG tablet   Commonly known as:  COREG   Used for:  Essential hypertension        Dose:  3.125 mg   Take 1 tablet (3.125 mg) by mouth 2 times daily (with meals)   Quantity:  60 tablet   Refills:  0        diclofenac 1 % Gel topical gel   Commonly known as:  VOLTAREN   Used for:  Multiple joint pain        Dose:  2 g   Place 2 g onto the skin 4 times daily Leg and knee   Quantity:  1 Tube   Refills:  0        * gabapentin 100 MG capsule   Commonly known as:  NEURONTIN   Used for:  Neuropathy        Dose:  200 mg   Take 2 capsules (200 mg) by mouth At Bedtime   Quantity:  60 capsule   Refills:  0        * gabapentin 100 MG capsule   Commonly known as:  NEURONTIN   Used for:  Neuropathy        Dose:  100 mg   Start taking on:  10/23/2018   Take 1 capsule (100 mg) by mouth 2 times daily   Quantity:  60 capsule   Refills:  0        * insulin aspart 100 UNIT/ML injection   Commonly known as:  NovoLOG PEN   Used for:  Type 2 diabetes mellitus with hyperosmolarity without coma, unspecified whether long term insulin use (H)        Dose:  1-10 Units   Inject 1-10 Units Subcutaneous 3 times daily (before meals)   Refills:  0        * insulin aspart 100 UNIT/ML injection   Commonly known as:  NovoLOG PEN   Used for:  Type 2 diabetes mellitus with hyperosmolarity without coma, unspecified whether long term insulin use (H)        Dose:  1-7 Units   Inject 1-7 Units Subcutaneous At Bedtime   Refills:  0        lidocaine 4 % Crea cream   Commonly  known as:  LMX4   Used for:  Acute gouty arthropathy        Apply topically every hour as needed for pain (with VAD insertion or accessing implanted port.)   Quantity:  1 Tube   Refills:  0        minocycline 100 MG capsule   Commonly known as:  MINOCIN/DYNACIN   Indication:  Infection of the Skin and/or Related Soft Tissue        Dose:  100 mg   Take 1 capsule (100 mg) by mouth every 12 hours for 9 days   Quantity:  18 capsule   Refills:  0        omeprazole 20 MG CR capsule   Commonly known as:  priLOSEC   Used for:  Acute superficial gastritis without hemorrhage        Dose:  20 mg   Start taking on:  10/23/2018   Take 1 capsule (20 mg) by mouth every morning (before breakfast)   Quantity:  30 capsule   Refills:  0        oxyCODONE IR 5 MG tablet   Commonly known as:  ROXICODONE   Used for:  Multiple joint pain        Dose:  2.5 mg   Take 0.5 tablets (2.5 mg) by mouth every 4 hours as needed for severe pain   Quantity:  6 tablet   Refills:  0        predniSONE 20 MG tablet   Commonly known as:  DELTASONE   Used for:  Acute gouty arthropathy        Dose:  40 mg   Start taking on:  10/23/2018   Take 2 tablets (40 mg) by mouth daily for 3 days   Quantity:  6 tablet   Refills:  0        traMADol 50 MG tablet   Commonly known as:  ULTRAM   Used for:  Multiple joint pain        Dose:  50 mg   Take 1 tablet (50 mg) by mouth every 6 hours as needed for moderate pain   Quantity:  10 tablet   Refills:  0        * Notice:  This list has 4 medication(s) that are the same as other medications prescribed for you. Read the directions carefully, and ask your doctor or other care provider to review them with you.      CONTINUE these medications which have NOT CHANGED        Dose / Directions Comments    aspirin 325 MG tablet        Dose:  325 mg   Take 325 mg by mouth daily   Refills:  0        blood glucose monitoring test strip   Commonly known as:  ACCU-CHEK SMARTVIEW   Used for:  Type 2 diabetes mellitus with chronic kidney  disease, with long-term current use of insulin, unspecified CKD stage (H)        USE TO TEST TWICE DAILY   Quantity:  200 strip   Refills:  1    Type 2 diabetes mellitus with chronic kidney disease, with long-term current use of insulin, unspecified CKD stage (H) [E11.22, Z79.4]       CENTRUM SILVER per tablet        Dose:  1 tablet   Take 1 tablet by mouth daily   Quantity:  30 tablet   Refills:  0    HOLD while on ntibiotics       ferrous gluconate 324 (38 Fe) MG tablet   Commonly known as:  FERGON        Dose:  1 tablet   Take 1 tablet by mouth daily.   Refills:  0        finasteride 5 MG tablet   Commonly known as:  PROSCAR        Dose:  5 mg   Take 5 mg by mouth daily.   Refills:  0        fluticasone 50 MCG/ACT spray   Commonly known as:  FLONASE        Dose:  1 spray   Spray 1 spray into both nostrils 2 times daily   Refills:  0        lisinopril 10 MG tablet   Commonly known as:  PRINIVIL/ZESTRIL   Used for:  Essential hypertension        TAKE 2 TABLETS (20 MG) BY MOUTH DAILY   Quantity:  180 tablet   Refills:  3        loratadine 10 MG tablet   Commonly known as:  CLARITIN        Dose:  10 mg   Take 10 mg by mouth At Bedtime   Refills:  0        metFORMIN 1000 MG tablet   Commonly known as:  GLUCOPHAGE   Used for:  Type 2 diabetes mellitus without complication, without long-term current use of insulin (H)        TAKE 1 TABLET (1,000 MG) BY MOUTH 2 TIMES DAILY (WITH MEALS) **DUE FOR APRIL APPT/LABS. CALL MD**   Quantity:  180 tablet   Refills:  1        tamsulosin 0.4 MG capsule   Commonly known as:  FLOMAX        Dose:  1 capsule   Take 1 capsule by mouth daily.   Refills:  0          STOP taking     ALEVE 220 MG capsule   Generic drug:  naproxen sodium           SYSTANE BALANCE 0.6 % Soln ophthalmic solution   Generic drug:  propylene glycol                     Further instructions from your care team       A Hospital Discharge follow up with Dr. Anh Spivey has been scheduled for you on Tuesday October  23rd at 2:40pm     Summary of Visit     Reason for your hospital stay       Please refer to discharge summary.  Patient admitted for diabetic foot infection.  Postoperatively patient developed gout and reacted to Ancef.  He is being discharged with treatment for gout and antibiotic for foot infection. now switched to minocycline.  Discharge to transitional care unit for rehab  We will need to monitor blood glucose closely as patient is diabetic and is on prednisone  Still requires supplemental O2 wean as able             After Care     Activity       Your activity upon discharge: minimal heel weight bearing in post op boot left foot.       Activity - Up with nursing assistance           Advance Diet as Tolerated       Follow this diet upon discharge: Orders Placed This Encounter      Moderate Consistent CHO Diet       Fall precautions           General info for SNF       Length of Stay Estimate: Short Term Care: Estimated # of Days <30  Condition at Discharge: Improving  Level of care:skilled   Rehabilitation Potential: Good  Admission H&P remains valid and up-to-date: Yes  Recent Chemotherapy: N/A  Use Nursing Home Standing Orders: Yes       Glucose monitor nursing POCT       Before meals and at bedtime       Intake and output       Every shift       Mantoux instructions       Give two-step Mantoux (PPD) Per Facility Policy Yes       Wound care (specify)       Site: Postop care  Instructions: As directed       Wound care and dressings       Instructions to care for your wound at home: keep foot and dressing dry. Will change at first clinic visit.             Procedures     Oxygen - Nasal cannula       1-2 Lpm by nasal cannula to keep O2 sats 92% or greater.  Wean as able             Referrals     Occupational Therapy Adult Consult       Evaluate and treat as clinically indicated.    Reason: Deconditioning       Physical Therapy Adult Consult       Evaluate and treat as clinically indicated.    Reason: Postop              Your next 10 appointments already scheduled     Oct 23, 2018  2:40 PM CDT   [facility]    [address]  [facility] [address] [phone]           Bring a current list of meds and any records pertaining to this visit. For Physicals, please bring immunization records and any forms needing to be filled out. Please arrive 10 minutes early to complete paperwork.              Follow-Up Appointment Instructions     Future Labs/Procedures    Follow Up and recommended labs and tests     Comments:    Follow up with [facility] physician.  The following labs/tests are recommended: CBC in 1-2 weeks  Follow-up with orthopedic surgery for bilateral osteoarthritis and new diagnosis of gout.  Follow-up with podiatry.  For post follow-up  Follow with primary care provider for diabetes management and monitor blood pressure and further evaluation for gout if necessary.  Wean supplemental O2  Monitor blood glucose especially while patient is on prednisone    Follow-up and recommended labs and tests      Comments:    Follow up with Dr. Azevedo in 1 week from discharge from the hospital.  For APPOINTMENTS: [phone].  For questions or concerns: call: [phone]      Follow-Up Appointment Instructions     Follow Up and recommended labs and tests       Follow up with [facility] physician.  The following labs/tests are recommended: CBC in 1-2 weeks  Follow-up with orthopedic surgery for bilateral osteoarthritis and new diagnosis of gout.  Follow-up with podiatry.  For post follow-up  Follow with primary care provider for diabetes management and monitor blood pressure and further evaluation for gout if necessary.  Wean supplemental O2  Monitor blood glucose especially while patient is on prednisone       Follow-up and recommended labs and tests        Follow up with Dr. Azevedo in 1 week from discharge from the hospital.  For  APPOINTMENTS: [phone].  For questions or concerns: call: [phone]             Statement of Approval     Ordered          10/22/18 0346  I have reviewed and agree with all the recommendations and orders detailed in this document.  EFFECTIVE NOW     Approved and electronically signed by:  Charlotte Pugh MD

## 2018-10-14 NOTE — ED NOTES
"[facility]  ED Nurse Handoff Report    Lance Ibrahim is a 73 year old male   ED Chief complaint: Cellulitis  . ED Diagnosis:   Final diagnoses:   Cellulitis and abscess of leg     Allergies:   Allergies   Allergen Reactions     Penicillins      \"anaphylactic\"     Sulfa Drugs      \"itchy rash, swelling of face and hands\"       Code Status: Full Code  Activity level - Baseline/Home:  Stand with Assist. Activity Level - Current:   Stand with Assist. Lift room needed: No. Bariatric: No   Needed: No   Isolation: No. Infection: Not Applicable.     Vital Signs:   Vitals:    10/14/18 1457 10/14/18 1500 10/14/18 1515 10/14/18 1530   BP:  153/77 (!) 165/92    Resp:       Temp:       TempSrc:       SpO2:  93% 91% 99%   Weight: 116.6 kg (257 lb)      Height: 1.829 m (6')          Cardiac Rhythm:  ,      Pain level:    Patient confused: No. Patient Falls Risk: Yes.   Elimination Status: Has voided   Patient Report - Initial Complaint: left foot swelling. Focused Assessment: Pt has left foot edema that migrates up to lower calf with redness and warmth to touch. Pt's left middle toe appears slightly green and scaly.   Tests Performed:   Labs Ordered and Resulted from Time of ED Arrival Up to the Time of Departure from the ED   CBC WITH PLATELETS DIFFERENTIAL - Abnormal; Notable for the following:        Result Value    WBC 18.0 (*)     RBC Count 4.24 (*)     Hemoglobin 12.2 (*)     Hematocrit 39.1 (*)     MCHC 31.2 (*)     RDW 15.9 (*)     Absolute Neutrophil 15.8 (*)     All other components within normal limits   BASIC METABOLIC PANEL - Abnormal; Notable for the following:     Glucose 134 (*)     Creatinine 1.28 (*)     GFR Estimate 55 (*)     All other components within normal limits   LACTIC ACID   BLOOD CULTURE   BLOOD CULTURE     Foot XR, G/E 3 views, left    (Results Pending)   XR Tibia & Fibula Left 2 Views    (Results Pending)       Treatments provided: see MAR, imaging, labs  Family Comments: " NA  OBS brochure/video discussed/provided to patient:  No  ED Medications:   Medications   clindamycin (CLEOCIN) infusion 900 mg (900 mg Intravenous New Bag 10/14/18 3018)   ceFEPIme (MAXIPIME) 1g vial to attach to  ml bag for ADULTS or NS 50 ml bag for PEDS (not administered)   vancomycin (VANCOCIN) 1,500 mg in sodium chloride 0.9 % 250 mL intermittent infusion (1,500 mg Intravenous New Bag 10/14/18 9375)   0.9% sodium chloride BOLUS (3,000 mLs Intravenous New Bag 10/14/18 7299)     Drips infusing:  Yes  For the majority of the shift, the patient's behavior Green. Interventions performed were none.     Severe Sepsis OR Septic Shock Diagnosis Present: No      ED Nurse Name/Phone Number: Di Mcfarland,   4:13 PM    RECEIVING UNIT ED HANDOFF REVIEW    Above ED Nurse Handoff Report was reviewed: Yes  Reviewed by: Kiana Grey on October 14, 2018 at 4:48 PM

## 2018-10-15 ENCOUNTER — ANESTHESIA EVENT (OUTPATIENT)
Dept: SURGERY | Facility: CLINIC | Age: 73
DRG: 853 | End: 2018-10-15
Payer: COMMERCIAL

## 2018-10-15 ENCOUNTER — APPOINTMENT (OUTPATIENT)
Dept: ULTRASOUND IMAGING | Facility: CLINIC | Age: 73
DRG: 853 | End: 2018-10-15
Attending: INTERNAL MEDICINE
Payer: COMMERCIAL

## 2018-10-15 ENCOUNTER — ANESTHESIA (OUTPATIENT)
Dept: SURGERY | Facility: CLINIC | Age: 73
DRG: 853 | End: 2018-10-15
Payer: COMMERCIAL

## 2018-10-15 ENCOUNTER — APPOINTMENT (OUTPATIENT)
Dept: MRI IMAGING | Facility: CLINIC | Age: 73
DRG: 853 | End: 2018-10-15
Attending: INTERNAL MEDICINE
Payer: COMMERCIAL

## 2018-10-15 LAB
ANION GAP SERPL CALCULATED.3IONS-SCNC: 9 MMOL/L (ref 3–14)
BASOPHILS # BLD AUTO: 0 10E9/L (ref 0–0.2)
BASOPHILS NFR BLD AUTO: 0.2 %
BUN SERPL-MCNC: 10 MG/DL (ref 7–30)
CALCIUM SERPL-MCNC: 8.4 MG/DL (ref 8.5–10.1)
CHLORIDE SERPL-SCNC: 107 MMOL/L (ref 94–109)
CO2 SERPL-SCNC: 21 MMOL/L (ref 20–32)
CREAT SERPL-MCNC: 0.92 MG/DL (ref 0.66–1.25)
DIFFERENTIAL METHOD BLD: ABNORMAL
EOSINOPHIL # BLD AUTO: 0.1 10E9/L (ref 0–0.7)
EOSINOPHIL NFR BLD AUTO: 0.5 %
ERYTHROCYTE [DISTWIDTH] IN BLOOD BY AUTOMATED COUNT: 15.9 % (ref 10–15)
GFR SERPL CREATININE-BSD FRML MDRD: 80 ML/MIN/1.7M2
GLUCOSE BLDC GLUCOMTR-MCNC: 117 MG/DL (ref 70–99)
GLUCOSE BLDC GLUCOMTR-MCNC: 127 MG/DL (ref 70–99)
GLUCOSE BLDC GLUCOMTR-MCNC: 137 MG/DL (ref 70–99)
GLUCOSE BLDC GLUCOMTR-MCNC: 171 MG/DL (ref 70–99)
GLUCOSE BLDC GLUCOMTR-MCNC: 90 MG/DL (ref 70–99)
GLUCOSE BLDC GLUCOMTR-MCNC: 95 MG/DL (ref 70–99)
GLUCOSE SERPL-MCNC: 142 MG/DL (ref 70–99)
GRAM STN SPEC: NORMAL
GRAM STN SPEC: NORMAL
HCT VFR BLD AUTO: 32.5 % (ref 40–53)
HGB BLD-MCNC: 10.2 G/DL (ref 13.3–17.7)
IMM GRANULOCYTES # BLD: 0.2 10E9/L (ref 0–0.4)
IMM GRANULOCYTES NFR BLD: 1 %
LACTATE BLD-SCNC: 0.9 MMOL/L (ref 0.7–2)
LYMPHOCYTES # BLD AUTO: 1.3 10E9/L (ref 0.8–5.3)
LYMPHOCYTES NFR BLD AUTO: 7.8 %
Lab: NORMAL
MCH RBC QN AUTO: 29.6 PG (ref 26.5–33)
MCHC RBC AUTO-ENTMCNC: 31.4 G/DL (ref 31.5–36.5)
MCV RBC AUTO: 94 FL (ref 78–100)
MONOCYTES # BLD AUTO: 1.2 10E9/L (ref 0–1.3)
MONOCYTES NFR BLD AUTO: 6.8 %
NEUTROPHILS # BLD AUTO: 14.3 10E9/L (ref 1.6–8.3)
NEUTROPHILS NFR BLD AUTO: 83.7 %
NRBC # BLD AUTO: 0 10*3/UL
NRBC BLD AUTO-RTO: 0 /100
PLATELET # BLD AUTO: 261 10E9/L (ref 150–450)
POTASSIUM SERPL-SCNC: 4 MMOL/L (ref 3.4–5.3)
RBC # BLD AUTO: 3.45 10E12/L (ref 4.4–5.9)
SODIUM SERPL-SCNC: 137 MMOL/L (ref 133–144)
SPECIMEN SOURCE: NORMAL
WBC # BLD AUTO: 17.1 10E9/L (ref 4–11)

## 2018-10-15 PROCEDURE — 0Y6U0Z0 DETACHMENT AT LEFT 3RD TOE, COMPLETE, OPEN APPROACH: ICD-10-PCS | Performed by: PODIATRIST

## 2018-10-15 PROCEDURE — 27210794 ZZH OR GENERAL SUPPLY STERILE: Performed by: PODIATRIST

## 2018-10-15 PROCEDURE — 40000306 ZZH STATISTIC PRE PROC ASSESS II: Performed by: PODIATRIST

## 2018-10-15 PROCEDURE — 99222 1ST HOSP IP/OBS MODERATE 55: CPT | Mod: 57 | Performed by: PODIATRIST

## 2018-10-15 PROCEDURE — 99207 ZZC CDG-MDM COMPONENT: MEETS LOW - DOWN CODED: CPT | Performed by: INTERNAL MEDICINE

## 2018-10-15 PROCEDURE — 87076 CULTURE ANAEROBE IDENT EACH: CPT | Performed by: PODIATRIST

## 2018-10-15 PROCEDURE — 71000012 ZZH RECOVERY PHASE 1 LEVEL 1 FIRST HR: Performed by: PODIATRIST

## 2018-10-15 PROCEDURE — 88305 TISSUE EXAM BY PATHOLOGIST: CPT | Performed by: PODIATRIST

## 2018-10-15 PROCEDURE — 88311 DECALCIFY TISSUE: CPT | Performed by: PODIATRIST

## 2018-10-15 PROCEDURE — 88311 DECALCIFY TISSUE: CPT | Mod: 26 | Performed by: PODIATRIST

## 2018-10-15 PROCEDURE — 37000009 ZZH ANESTHESIA TECHNICAL FEE, EACH ADDTL 15 MIN: Performed by: PODIATRIST

## 2018-10-15 PROCEDURE — 99231 SBSQ HOSP IP/OBS SF/LOW 25: CPT | Performed by: INTERNAL MEDICINE

## 2018-10-15 PROCEDURE — 87186 SC STD MICRODIL/AGAR DIL: CPT | Performed by: PODIATRIST

## 2018-10-15 PROCEDURE — 36000058 ZZH SURGERY LEVEL 3 EA 15 ADDTL MIN: Performed by: PODIATRIST

## 2018-10-15 PROCEDURE — 12000007 ZZH R&B INTERMEDIATE

## 2018-10-15 PROCEDURE — 73720 MRI LWR EXTREMITY W/O&W/DYE: CPT | Mod: LT

## 2018-10-15 PROCEDURE — 25000128 H RX IP 250 OP 636: Performed by: ANESTHESIOLOGY

## 2018-10-15 PROCEDURE — 87070 CULTURE OTHR SPECIMN AEROBIC: CPT | Performed by: PODIATRIST

## 2018-10-15 PROCEDURE — 87077 CULTURE AEROBIC IDENTIFY: CPT | Performed by: PODIATRIST

## 2018-10-15 PROCEDURE — 37000008 ZZH ANESTHESIA TECHNICAL FEE, 1ST 30 MIN: Performed by: PODIATRIST

## 2018-10-15 PROCEDURE — 80048 BASIC METABOLIC PNL TOTAL CA: CPT | Performed by: INTERNAL MEDICINE

## 2018-10-15 PROCEDURE — 87205 SMEAR GRAM STAIN: CPT | Performed by: PODIATRIST

## 2018-10-15 PROCEDURE — 25500064 ZZH RX 255 OP 636: Performed by: INTERNAL MEDICINE

## 2018-10-15 PROCEDURE — 87075 CULTR BACTERIA EXCEPT BLOOD: CPT | Performed by: PODIATRIST

## 2018-10-15 PROCEDURE — 12000000 ZZH R&B MED SURG/OB

## 2018-10-15 PROCEDURE — A9585 GADOBUTROL INJECTION: HCPCS | Performed by: INTERNAL MEDICINE

## 2018-10-15 PROCEDURE — 93971 EXTREMITY STUDY: CPT | Mod: LT

## 2018-10-15 PROCEDURE — 85025 COMPLETE CBC W/AUTO DIFF WBC: CPT | Performed by: INTERNAL MEDICINE

## 2018-10-15 PROCEDURE — 25000125 ZZHC RX 250: Performed by: NURSE ANESTHETIST, CERTIFIED REGISTERED

## 2018-10-15 PROCEDURE — 25000128 H RX IP 250 OP 636: Performed by: PODIATRIST

## 2018-10-15 PROCEDURE — 88305 TISSUE EXAM BY PATHOLOGIST: CPT | Mod: 26 | Performed by: PODIATRIST

## 2018-10-15 PROCEDURE — 36415 COLL VENOUS BLD VENIPUNCTURE: CPT | Performed by: INTERNAL MEDICINE

## 2018-10-15 PROCEDURE — 25000132 ZZH RX MED GY IP 250 OP 250 PS 637: Performed by: INTERNAL MEDICINE

## 2018-10-15 PROCEDURE — 36000056 ZZH SURGERY LEVEL 3 1ST 30 MIN: Performed by: PODIATRIST

## 2018-10-15 PROCEDURE — 83605 ASSAY OF LACTIC ACID: CPT | Performed by: INTERNAL MEDICINE

## 2018-10-15 PROCEDURE — 28820 AMPUTATION OF TOE: CPT | Mod: T3 | Performed by: PODIATRIST

## 2018-10-15 PROCEDURE — 25000128 H RX IP 250 OP 636: Performed by: INTERNAL MEDICINE

## 2018-10-15 PROCEDURE — 00000146 ZZHCL STATISTIC GLUCOSE BY METER IP

## 2018-10-15 RX ORDER — LABETALOL HYDROCHLORIDE 5 MG/ML
10 INJECTION, SOLUTION INTRAVENOUS
Status: DISCONTINUED | OUTPATIENT
Start: 2018-10-15 | End: 2018-10-15 | Stop reason: HOSPADM

## 2018-10-15 RX ORDER — HYDRALAZINE HYDROCHLORIDE 20 MG/ML
2.5-5 INJECTION INTRAMUSCULAR; INTRAVENOUS EVERY 10 MIN PRN
Status: DISCONTINUED | OUTPATIENT
Start: 2018-10-15 | End: 2018-10-15 | Stop reason: HOSPADM

## 2018-10-15 RX ORDER — HYDROMORPHONE HYDROCHLORIDE 1 MG/ML
.3-.5 INJECTION, SOLUTION INTRAMUSCULAR; INTRAVENOUS; SUBCUTANEOUS EVERY 5 MIN PRN
Status: DISCONTINUED | OUTPATIENT
Start: 2018-10-15 | End: 2018-10-15 | Stop reason: HOSPADM

## 2018-10-15 RX ORDER — SODIUM CHLORIDE, SODIUM LACTATE, POTASSIUM CHLORIDE, CALCIUM CHLORIDE 600; 310; 30; 20 MG/100ML; MG/100ML; MG/100ML; MG/100ML
500 INJECTION, SOLUTION INTRAVENOUS CONTINUOUS
Status: DISCONTINUED | OUTPATIENT
Start: 2018-10-15 | End: 2018-10-15 | Stop reason: HOSPADM

## 2018-10-15 RX ORDER — SODIUM CHLORIDE, SODIUM LACTATE, POTASSIUM CHLORIDE, CALCIUM CHLORIDE 600; 310; 30; 20 MG/100ML; MG/100ML; MG/100ML; MG/100ML
INJECTION, SOLUTION INTRAVENOUS CONTINUOUS
Status: DISCONTINUED | OUTPATIENT
Start: 2018-10-15 | End: 2018-10-15 | Stop reason: HOSPADM

## 2018-10-15 RX ORDER — CLINDAMYCIN PHOSPHATE 900 MG/50ML
900 INJECTION, SOLUTION INTRAVENOUS
Status: DISCONTINUED | OUTPATIENT
Start: 2018-10-15 | End: 2018-10-15

## 2018-10-15 RX ORDER — GADOBUTROL 604.72 MG/ML
15 INJECTION INTRAVENOUS ONCE
Status: COMPLETED | OUTPATIENT
Start: 2018-10-15 | End: 2018-10-15

## 2018-10-15 RX ORDER — ONDANSETRON 4 MG/1
4 TABLET, ORALLY DISINTEGRATING ORAL EVERY 30 MIN PRN
Status: DISCONTINUED | OUTPATIENT
Start: 2018-10-15 | End: 2018-10-15 | Stop reason: HOSPADM

## 2018-10-15 RX ORDER — BUPIVACAINE HYDROCHLORIDE 5 MG/ML
INJECTION, SOLUTION EPIDURAL; INTRACAUDAL PRN
Status: DISCONTINUED | OUTPATIENT
Start: 2018-10-15 | End: 2018-10-15 | Stop reason: HOSPADM

## 2018-10-15 RX ORDER — CLINDAMYCIN PHOSPHATE 900 MG/50ML
900 INJECTION, SOLUTION INTRAVENOUS SEE ADMIN INSTRUCTIONS
Status: DISCONTINUED | OUTPATIENT
Start: 2018-10-15 | End: 2018-10-15

## 2018-10-15 RX ORDER — LIDOCAINE 40 MG/G
CREAM TOPICAL
Status: DISCONTINUED | OUTPATIENT
Start: 2018-10-15 | End: 2018-10-15 | Stop reason: HOSPADM

## 2018-10-15 RX ORDER — PROPOFOL 10 MG/ML
INJECTION, EMULSION INTRAVENOUS CONTINUOUS PRN
Status: DISCONTINUED | OUTPATIENT
Start: 2018-10-15 | End: 2018-10-15

## 2018-10-15 RX ORDER — ONDANSETRON 2 MG/ML
4 INJECTION INTRAMUSCULAR; INTRAVENOUS EVERY 30 MIN PRN
Status: DISCONTINUED | OUTPATIENT
Start: 2018-10-15 | End: 2018-10-15 | Stop reason: HOSPADM

## 2018-10-15 RX ORDER — FENTANYL CITRATE 50 UG/ML
25-50 INJECTION, SOLUTION INTRAMUSCULAR; INTRAVENOUS
Status: DISCONTINUED | OUTPATIENT
Start: 2018-10-15 | End: 2018-10-15 | Stop reason: HOSPADM

## 2018-10-15 RX ADMIN — ONDANSETRON 4 MG: 2 INJECTION INTRAMUSCULAR; INTRAVENOUS at 18:49

## 2018-10-15 RX ADMIN — SODIUM CHLORIDE, SODIUM LACTATE, POTASSIUM CHLORIDE, CALCIUM CHLORIDE: 600; 310; 30; 20 INJECTION, SOLUTION INTRAVENOUS at 15:41

## 2018-10-15 RX ADMIN — SODIUM CHLORIDE: 9 INJECTION, SOLUTION INTRAVENOUS at 09:38

## 2018-10-15 RX ADMIN — TAMSULOSIN HYDROCHLORIDE 0.4 MG: 0.4 CAPSULE ORAL at 08:33

## 2018-10-15 RX ADMIN — DEXTRAN 70 AND HYPROMELLOSE 2910 1 DROP: 1; 3 SOLUTION/ DROPS OPHTHALMIC at 08:33

## 2018-10-15 RX ADMIN — LISINOPRIL 20 MG: 20 TABLET ORAL at 08:33

## 2018-10-15 RX ADMIN — SODIUM CHLORIDE: 9 INJECTION, SOLUTION INTRAVENOUS at 04:01

## 2018-10-15 RX ADMIN — ACETAMINOPHEN 650 MG: 325 TABLET, FILM COATED ORAL at 19:30

## 2018-10-15 RX ADMIN — CEFEPIME HYDROCHLORIDE 1 G: 1 INJECTION, POWDER, FOR SOLUTION INTRAMUSCULAR; INTRAVENOUS at 14:37

## 2018-10-15 RX ADMIN — SODIUM CHLORIDE: 9 INJECTION, SOLUTION INTRAVENOUS at 18:18

## 2018-10-15 RX ADMIN — LORATADINE 10 MG: 10 TABLET ORAL at 21:42

## 2018-10-15 RX ADMIN — FINASTERIDE 5 MG: 5 TABLET, FILM COATED ORAL at 08:33

## 2018-10-15 RX ADMIN — ASPIRIN 325 MG ORAL TABLET 325 MG: 325 PILL ORAL at 08:33

## 2018-10-15 RX ADMIN — CEFEPIME HYDROCHLORIDE 1 G: 1 INJECTION, POWDER, FOR SOLUTION INTRAMUSCULAR; INTRAVENOUS at 04:02

## 2018-10-15 RX ADMIN — PROPOFOL 100 MCG/KG/MIN: 10 INJECTION, EMULSION INTRAVENOUS at 16:05

## 2018-10-15 RX ADMIN — GADOBUTROL 11 ML: 604.72 INJECTION INTRAVENOUS at 09:19

## 2018-10-15 RX ADMIN — VANCOMYCIN HYDROCHLORIDE 2000 MG: 10 INJECTION, POWDER, LYOPHILIZED, FOR SOLUTION INTRAVENOUS at 04:05

## 2018-10-15 ASSESSMENT — ACTIVITIES OF DAILY LIVING (ADL)
ADLS_ACUITY_SCORE: 9

## 2018-10-15 NOTE — PLAN OF CARE
Problem: Patient Care Overview  Goal: Plan of Care/Patient Progress Review  Outcome: No Change  Vital signs stable. Voids in urinal at bedside.  Denies pain. Redness, warmth and moderate swelling and  edema in lower left ankle foot.  Third toe on left foot swollen, denuded, grayish in color.Dry sterile dressing on toe. .Extremity elevated on pillows.Pt is on iv abx, glucose checks.Falls risk precautions.Assist of 2 with gait belt.  Ultra sound of left leg was done overnight.  Plan: Podiatry and ID consults today.

## 2018-10-15 NOTE — ANESTHESIA CARE TRANSFER NOTE
Patient: Lance Ibrahim    Procedure(s):  left 3rd toe amputation   - Wound Class: III-Contaminated    Diagnosis: unknown  Diagnosis Additional Information: No value filed.    Anesthesia Type:   MAC     Note:  Airway :Face Mask  Patient transferred to:PACU  Comments: Pt awake SV VSS, prepare to transfer to PACU.  Report to PACU, VSS.  Transfer careHandoff Report: Identifed the Patient, Identified the Reponsible Provider, Reviewed the pertinent medical history, Discussed the surgical course, Reviewed Intra-OP anesthesia mangement and issues during anesthesia, Set expectations for post-procedure period and Allowed opportunity for questions and acknowledgement of understanding      Vitals: (Last set prior to Anesthesia Care Transfer)    CRNA VITALS  10/15/2018 1606 - 10/15/2018 1641      10/15/2018             Pulse: 106    SpO2: (!)  89 %                Electronically Signed By: RANJAN Naylor CRNA  October 15, 2018  4:41 PM

## 2018-10-15 NOTE — BRIEF OP NOTE
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   Post-operative diagnosis same   Procedure: Procedure(s):  left 3rd toe amputation   - Wound Class: III-Contaminated   Surgeon(s): Surgeon(s) and Role:     * Ayaka Azevedo DPM, Podiatry/Foot and Ankle Surgery - Primary   Estimated blood loss: 10ml    Specimens:   ID Type Source Tests Collected by Time Destination   1 : left foot wound  Tissue Foot, Left ANAEROBIC BACTERIAL CULTURE, GRAM STAIN, TISSUE CULTURE AEROBIC BACTERIAL Ayaka Azevedo DPM, Podiatry/Foot and Ankle Surgery 10/15/2018  4:15 PM    A : left 3rd toe  Tissue Toe SURGICAL PATHOLOGY EXAM Ayaka Azevedo DPM, Podiatry/Foot and Ankle Surgery 10/15/2018  4:16 PM       Findings: See op note

## 2018-10-15 NOTE — ANESTHESIA POSTPROCEDURE EVALUATION
Patient: Lance Ibrahim    Procedure(s):  left 3rd toe amputation   - Wound Class: III-Contaminated    Diagnosis:unknown  Diagnosis Additional Information: Surgeon Brief Op Note       Brief Op Note by Ayaka Azevedo DPM, Podiatry/Foot and Ankle Surgery at 10/15/2018  4:32 PM  Version 1 of 1    Author: Ayaka Azevedo DPM, Podiatry/Foot and Ankle Surgery Service: Surgery Author Type: Physician    Filed:,  10/15/2018  4:33 PM Date of Service: 10/15/2018  4:32 PM Creation Time: 10/15/2018  4:32 PM    Status: Signed : Ayaka Azevedo DPM, Podiatry/Foot and Ankle Surgery (Physician)         [facility] Brief Operative Note       Pre-operative,  diagnosis: 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  Post-operative diagnosis same  Procedure: Procedure(s):  left 3rd toe amputation   - Wound Class: III-Contaminated  Surgeon(s): Surgeon(s) and Role:     * Ayaka Azevedo DPM, Podiatry/Foot , and Ankle Surgery - Primary  Estimated blood loss: 10ml                        Specimens:      ID Type Source Tests Collected by Time Destination  1 : left                       Anesthesia Type:  MAC    Note:  Anesthesia Post Evaluation    Patient location during evaluation: PACU  Patient participation: Able to fully participate in evaluation  Level of consciousness: awake  Pain management: adequate  Airway patency: patent  Cardiovascular status: acceptable  Respiratory status: acceptable  Hydration status: acceptable  PONV: none     Anesthetic complications: None          Last vitals:  Vitals:    10/15/18 1645 10/15/18 1650 10/15/18 1655   BP: 136/75 143/78 140/76   Pulse:      Resp: 23 19 23   Temp:      SpO2: 98% 91% 97%         Electronically Signed By: Jefe Jules MD  October 15, 2018  5:04 PM

## 2018-10-15 NOTE — PLAN OF CARE
Problem: Patient Care Overview  Goal: Plan of Care/Patient Progress Review  Outcome: No Change  Vital signs stable, low grade fever.  Lungs diminished in bases, encouraged inspirometer use.  Bowel sounds hypoactive, voids per urinal, was incontinent of urine x1 this evening,  Denies pain.  Redness,swelling and warmth present to left foot, ankle and leg.Third toe on left foot swollen, denuded, grayish in color.Ulcer to same cleansed with wound cleanser and dried with sterile gauze.Dry sterile dressing applied.Extremity elevated on pillows.Pt is on iv abx, glucose checks.Falls risk precautions.Assist of 2 with gait belt.

## 2018-10-15 NOTE — PROGRESS NOTES
[facility]  Hospitalist Progress Note  Lars Srinivasan MD 10/15/2018    Reason for Stay (Diagnosis): diabetic foot infection         Assessment and Plan:      Summary of Stay: Lance Ibrahim is a 73 year old diabetic male returned to the ED on 10/14/2018 with recurrent left lower extremity infection. Last hospitalization was in June, 2018 at which time he was treated with Clilndamycin and debridement by Podiatry. In the interim, he appears to have followed up with PANFILO James in August and was thought to have early changes compatible with Charcot Arthropathy.    At this time, the patient indicates that he has had shakes and chills related since about 3 days PTA when he first noted any discomfort in his left third toe.     I note that today, the MRI of the left foot indicates clear osteomyelitis changes in the left third toe distal phalanx. I held the patient's lunch and Dr. Azevedo was able to arrange amputation this afternoon.     Problem List:   1. Left third toe recurrent infection, now with clear evidence of osteomyelitis on MRI and surrounding cellulitis involving the distal foot and ankle. POD #0 s/p disarticulation of the third toe, left foot.   2. DEAN resolved with fluids.  3. DM II, non-insulin dependent. Managed with metformin, 1000 BID  4. HTN. On Lisinopril alone.    PLAN:  1.  Cont empiric Vanco and change Clinda to Cefepime, based on ID recs.  2.  On-going routine cares.     DVT Prophylaxis: Pneumatic Compression Devices  Code Status: Full Code  Discharge Dispo: Home expecgted.  Estimated Disch Date / # of Days until Disch: TBD, likely 1-2 more days.        Interval History (Subjective):      Chart reviewed, pt interviewed.    When I met pt he had not eaten since breakfast. He acknowledge that this is the third infection of this toe, and if it is recommended that he have it amputated, he would want to procede with that.     I discussed the case briefly with Dr. Azevedo.                   Physical Exam:      Last Vital Signs:  /80 (BP Location: Right arm)  Pulse 98  Temp 100.4  F (38  C) (Oral)  Resp 18  Ht 1.829 m (6')  Wt 116.6 kg (257 lb)  SpO2 96%  BMI 34.86 kg/m2    I/O last 3 completed shifts:  In: 1807 [P.O.:535; I.V.:1272]  Out: 2950 [Urine:2950]    Constitutional: Awake, alert, cooperative, no apparent distress   Respiratory: Clear to auscultation bilaterally, no crackles or wheezing   Cardiovascular: Regular rate and rhythm, normal S1 and S2, and no murmur noted   Abdomen: Normal bowel sounds, soft, non-distended, non-tender   Skin: No rashes, no cyanosis, dry to touch   Neuro: Alert and oriented x3, no weakness, numbness, memory loss   Extremities: Dense edema bilat LEs. Left third toe is large with evident underlying bruising and erythema consistent with deep infection. Purulent discharge noted over the distal aspect of the toe.   Other(s):        All other systems: Negative          Medications:      All current medications were reviewed with changes reflected in problem list.         Data:      All new lab and imaging data was reviewed.   Labs/Imaging:  Results for orders placed or performed during the hospital encounter of 10/14/18 (from the past 24 hour(s))   Lactic acid level STAT for sepsis protocol   Result Value Ref Range    Lactate for Sepsis Protocol 0.9 0.7 - 2.0 mmol/L   Glucose by meter   Result Value Ref Range    Glucose 137 (H) 70 - 99 mg/dL   US Lower Extremity Venous Duplex Left    Narrative    ULTRASOUND VENOUS LOWER EXTREMITY UNILATERAL LEFT October 15, 2018  1:34 AM     HISTORY: Rule out deep vein thrombosis, leg swelling.     COMPARISON: None.    TECHNIQUE: Ultrasound gray scale, Color Doppler flow, and spectral  Doppler waveform analysis performed.    FINDINGS: The left common femoral, superficial femoral, popliteal and  posterior tibial veins are patent and fully compressible and  demonstrate normal venous Doppler flow. The visualized greater  saphenous  vein is negative for thrombus.      Impression    IMPRESSION: No deep vein thrombosis demonstrated.    CATRINA VANG MD   Basic metabolic panel   Result Value Ref Range    Sodium 137 133 - 144 mmol/L    Potassium 4.0 3.4 - 5.3 mmol/L    Chloride 107 94 - 109 mmol/L    Carbon Dioxide 21 20 - 32 mmol/L    Anion Gap 9 3 - 14 mmol/L    Glucose 142 (H) 70 - 99 mg/dL    Urea Nitrogen 10 7 - 30 mg/dL    Creatinine 0.92 0.66 - 1.25 mg/dL    GFR Estimate 80 >60 mL/min/1.7m2    GFR Estimate If Black >90 >60 mL/min/1.7m2    Calcium 8.4 (L) 8.5 - 10.1 mg/dL   CBC with platelets differential   Result Value Ref Range    WBC 17.1 (H) 4.0 - 11.0 10e9/L    RBC Count 3.45 (L) 4.4 - 5.9 10e12/L    Hemoglobin 10.2 (L) 13.3 - 17.7 g/dL    Hematocrit 32.5 (L) 40.0 - 53.0 %    MCV 94 78 - 100 fl    MCH 29.6 26.5 - 33.0 pg    MCHC 31.4 (L) 31.5 - 36.5 g/dL    RDW 15.9 (H) 10.0 - 15.0 %    Platelet Count 261 150 - 450 10e9/L    Diff Method Automated Method     % Neutrophils 83.7 %    % Lymphocytes 7.8 %    % Monocytes 6.8 %    % Eosinophils 0.5 %    % Basophils 0.2 %    % Immature Granulocytes 1.0 %    Nucleated RBCs 0 0 /100    Absolute Neutrophil 14.3 (H) 1.6 - 8.3 10e9/L    Absolute Lymphocytes 1.3 0.8 - 5.3 10e9/L    Absolute Monocytes 1.2 0.0 - 1.3 10e9/L    Absolute Eosinophils 0.1 0.0 - 0.7 10e9/L    Absolute Basophils 0.0 0.0 - 0.2 10e9/L    Abs Immature Granulocytes 0.2 0 - 0.4 10e9/L    Absolute Nucleated RBC 0.0    Glucose by meter   Result Value Ref Range    Glucose 127 (H) 70 - 99 mg/dL   MR Foot Left w/o & w Contrast    Narrative    MR FOOT LEFT WITH AND WITHOUT CONTRAST October 15, 2018 9:28 AM    HISTORY: Evaluate for osteomyelitis.      COMPARISON: X-ray from 10/14/2018.    TECHNIQUE: Routine multiplanar, multisequence imaging was performed.  11 mL Gadavist.    FINDINGS: Prominent first MTP joint degenerative changes involving the  sesamoids and the first MTP joint. There is a joint effusion here.  There is abnormal bone  marrow edema in the proximal phalanx of the  great toe that could be degenerative in nature or related to  osteomyelitis. Remaining bone marrow signal is normal.    There is abnormal edema in the distal phalanx of the third toe as well  suspicious for osteomyelitis. There is diffuse prominent subcutaneous  edema throughout the foot with subcutaneous edema most prominent in  the third toe. No evidence of abscess.      Impression    IMPRESSION:   1. Abnormal bone marrow edema in the proximal phalanx of the great  toe. There are prominent first MTP joint degenerative changes and this  edema could be degenerative in nature but is slightly more prominent  than typically seen and osteomyelitis could be present in the proximal  phalanx of the great toe as well.  2. There is abnormal bone marrow edema in the distal phalanx of the  third toe with surrounding soft tissue edema suspicious for  osteomyelitis.  3. Diffuse subcutaneous edema consistent with cellulitis. No evidence  of abscess.    GREG ORTIZ MD   Glucose by meter   Result Value Ref Range    Glucose 90 70 - 99 mg/dL   Glucose by meter   Result Value Ref Range    Glucose 95 70 - 99 mg/dL   Anaerobic bacterial culture   Result Value Ref Range    Specimen Description Left Foot     Special Requests Received in anaerobic tubes.     Culture Micro PENDING    Gram stain   Result Value Ref Range    Specimen Description Left Foot     Special Requests Specimen collected in eSwab transport (white cap)     Gram Stain PENDING    Tissue Culture Aerobic Bacterial   Result Value Ref Range    Specimen Description Left Foot     Special Requests Specimen collected in eSwab transport (white cap)     Culture Micro PENDING    Glucose by meter   Result Value Ref Range    Glucose 117 (H) 70 - 99 mg/dL   Glucose by meter   Result Value Ref Range    Glucose 171 (H) 70 - 99 mg/dL

## 2018-10-15 NOTE — ANESTHESIA PREPROCEDURE EVALUATION
PAC NOTE:       ANESTHESIA PRE EVALUATION:  Anesthesia Evaluation     . Pt has had prior anesthetic.            ROS/MED HX    ENT/Pulmonary:  - neg pulmonary ROS     Neurologic:     (+)TIA     Cardiovascular:     (+) hypertension----. : . . . :. .       METS/Exercise Tolerance:     Hematologic:  - neg hematologic  ROS       Musculoskeletal:  - neg musculoskeletal ROS       GI/Hepatic:  - neg GI/hepatic ROS       Renal/Genitourinary:     (+) chronic renal disease, type: ARF, BPH, Other Renal/ Genitourinary, gout      Endo:     (+) type II DM Obesity, .      Psychiatric:         Infectious Disease:   (+) Other Infectious Disease LE cellulitis      Malignancy:         Other:                     Physical Exam  Normal systems: cardiovascular and pulmonary    Airway   Mallampati: II  TM distance: >3 FB  Neck ROM: full    Dental     Cardiovascular       Pulmonary              Anesthesia Plan      History & Physical Review  History and physical reviewed and following examination; no interval change.    ASA Status:  3 .    NPO Status:  > 8 hours    Plan for MAC Reason for MAC:  Deep or markedly invasive procedure (G8)         Postoperative Care  Postoperative pain management:  IV analgesics.      Consents  Anesthetic plan, risks, benefits and alternatives discussed with:  Patient.  Use of blood products discussed: Yes.   Use of blood products discussed with Patient.  Consented to blood products.  .                            .

## 2018-10-15 NOTE — PROGRESS NOTES
CTS identifies pt as high risk due to elevated RAMAKRISHNA and pt's 3rd admission for cellulitis in 6 months. Will assist in scheduling a hospital follow up appointment if appropriate on discharge.      Handoff will be given to PCP clinic at discharge.     CM will continue to follow patient for any additional discharge needs.     Orquidea Guy RN BSN CTS   [phone]  Care Transitions Team  [facility]

## 2018-10-15 NOTE — CONSULTS
ID consult dictated IMP 1 72 yo male acute L foot DFI    REC await op findings./cxs, same abx for now

## 2018-10-15 NOTE — CONSULTS
PATIENT HISTORY:  Lance Ibrahim is a 73 year old male who was admitted for left diabetic foot infection.       I was asked to see Lance Ibrahim  by  for left diabetic foot infection.    Patient was seen at bedside. Notes he noticed his left 3rd toe swelling on Sunday. Fevers and chills. No pain due to neuropathy.      Review of Systems:  Patient denies fever, chills, rash, wound, stiffness, limping,weakness, heart burn, blood in stool, chest pain with activity, calf pain when walking, shortness of breath with activity, chronic cough, easy bleeding/bruising, swelling of ankles, excessive thirst, fatigue, depression, anxiety.  Patient admits to numbness.     PAST MEDICAL HISTORY:   Past Medical History:   Diagnosis Date     Chronic rhinitis      Diabetes mellitus (H)      HTN (hypertension)      Hypertrophy of prostate with urinary obstruction and other lower urinary tract symptoms (LUTS)      TIA (transient ischaemic attack) 1993     Unspecified transient cerebral ischemia 1993    No definite source found        PAST SURGICAL HISTORY:   Past Surgical History:   Procedure Laterality Date     C NONSPECIFIC PROCEDURE  1985    Diastasis recti repair     C NONSPECIFIC PROCEDURE  1987    Ventral hernia repair     C NONSPECIFIC PROCEDURE      ORIF of left shoulder     COLONOSCOPY  3/9/2013    Procedure: COLONOSCOPY;  COLONOSCOPY;  Surgeon: Chau Hogan MD;  Location:  GI     EYE SURGERY  03/13/2017    left eye     FOOT SURGERY  4/2013    cyst removal      HERNIA REPAIR  7/13/2004    ventral         MEDICATIONS:   Current Facility-Administered Medications:      acetaminophen (TYLENOL) tablet 650 mg, 650 mg, Oral, Q4H PRN, Madi Pinzon MD, 650 mg at 10/14/18 2009     aspirin tablet 325 mg, 325 mg, Oral, Daily, Madi Pinzon MD, 325 mg at 10/15/18 0833     ceFEPIme (MAXIPIME) 1g vial to attach to  ml bag for ADULTS or NS 50 ml bag for PEDS, 1 g, Intravenous, Q12H, Madi Pinzon MD, 1 g at  10/15/18 1437     glucose gel 15-30 g, 15-30 g, Oral, Q15 Min PRN **OR** dextrose 50 % injection 25-50 mL, 25-50 mL, Intravenous, Q15 Min PRN **OR** glucagon injection 1 mg, 1 mg, Subcutaneous, Q15 Min PRN, Madi Pinzon MD     finasteride (PROSCAR) tablet 5 mg, 5 mg, Oral, Daily, Madi Pinzon MD, 5 mg at 10/15/18 0833     hypromellose-dextran (ARTIFICAL TEARS) 0.1-0.3 % ophthalmic solution 1 drop, 1 drop, Both Eyes, QAM, Madi Pinzon MD, 1 drop at 10/15/18 0833     insulin aspart (NovoLOG) inj (RAPID ACTING), 1-10 Units, Subcutaneous, TID AC, Madi Pinzon MD     insulin aspart (NovoLOG) inj (RAPID ACTING), 1-7 Units, Subcutaneous, At Bedtime, Madi Pinzon MD     lidocaine (LMX4) cream, , Topical, Q1H PRN, Madi Pinzon MD     lidocaine 1 % 1 mL, 1 mL, Other, Q1H PRN, Madi Pinzon MD     lisinopril (PRINIVIL/ZESTRIL) tablet 20 mg, 20 mg, Oral, Daily, Madi Pinzon MD, 20 mg at 10/15/18 0833     loratadine (CLARITIN) tablet 10 mg, 10 mg, Oral, At Bedtime, Madi Pinzon MD, 10 mg at 10/14/18 2010     melatonin tablet 1 mg, 1 mg, Oral, At Bedtime PRN, Madi Pinzon MD     naloxone (NARCAN) injection 0.1-0.4 mg, 0.1-0.4 mg, Intravenous, Q2 Min PRN, Madi Pinzon MD     ondansetron (ZOFRAN-ODT) ODT tab 4 mg, 4 mg, Oral, Q6H PRN **OR** ondansetron (ZOFRAN) injection 4 mg, 4 mg, Intravenous, Q6H PRN, Madi Pinzon MD     oxyCODONE IR (ROXICODONE) tablet 5 mg, 5 mg, Oral, Q4H PRN, Madi Pinzon MD     polyethylene glycol (MIRALAX/GLYCOLAX) Packet 17 g, 17 g, Oral, Daily PRN, Madi Pinzon MD     senna-docusate (SENOKOT-S;PERICOLACE) 8.6-50 MG per tablet 1 tablet, 1 tablet, Oral, BID PRN **OR** senna-docusate (SENOKOT-S;PERICOLACE) 8.6-50 MG per tablet 2 tablet, 2 tablet, Oral, BID PRN, Madi Pinzon MD     sodium chloride (PF) 0.9% PF flush 3 mL, 3 mL, Intracatheter, Q1H PRN, Madi Pinzon MD     sodium chloride (PF) 0.9% PF  "flush 3 mL, 3 mL, Intracatheter, Q8H, Madi Pinzon MD, 3 mL at 10/15/18 0938     sodium chloride 0.9% infusion, , Intravenous, Continuous, Madi Pinzon MD, Last Rate: 100 mL/hr at 10/15/18 0938     tamsulosin (FLOMAX) capsule 0.4 mg, 0.4 mg, Oral, Daily, Madi Pinzon MD, 0.4 mg at 10/15/18 0833     vancomycin (VANCOCIN) 2,000 mg in sodium chloride 0.9 % 500 mL intermittent infusion, 2,000 mg, Intravenous, Q12H, Madi Pinzon MD, 2,000 mg at 10/15/18 0405     ALLERGIES:    Allergies   Allergen Reactions     Penicillins      \"anaphylactic\"     Sulfa Drugs      \"itchy rash, swelling of face and hands\"        SOCIAL HISTORY:   Social History     Social History     Marital status:      Spouse name: N/A     Number of children: N/A     Years of education: N/A     Occupational History      Self     Social History Main Topics     Smoking status: Former Smoker     Quit date: 3/17/1993     Smokeless tobacco: Never Used     Alcohol use Yes      Comment: Occ, Once a month     Drug use: No     Sexual activity: No     Other Topics Concern     Not on file     Social History Narrative        FAMILY HISTORY:   Family History   Problem Relation Age of Onset     Family History Negative Mother       88 yo     Cancer Father       74 yo brain     Diabetes Maternal Grandfather       91 yo     Alcohol/Drug Paternal Grandfather              EXAM:Vitals: /72 (BP Location: Left arm)  Pulse 98  Temp 99.5  F (37.5  C) (Oral)  Resp 18  Ht 1.829 m (6')  Wt 116.6 kg (257 lb)  SpO2 92%  BMI 34.86 kg/m2  BMI= Body mass index is 34.86 kg/(m^2).    LABS:    WBC   Date Value Ref Range Status   10/15/2018 17.1 (H) 4.0 - 11.0 10e9/L Final     HA1C: 6.7 (2018)    General appearance: Patient is alert and fully cooperative with history & exam.  No sign of distress is noted during the visit.      Psychiatric: Affect is pleasant & appropriate.  Patient appears motivated to improve " health.       Respiratory: Breathing is regular & unlabored while sitting.      HEENT: Hearing is intact to spoken word.  Speech is clear.  No gross evidence of visual impairment that would impact ambulation.       Dermatologic: full thickness ulceration to the distal left 3rd toe.  Measures 0.4cm x 0.4cm x 0.3cm. Probes to bone. Purulent drainage noted from wound. Purplish discoloration to the left 3rd toe.     Vascular: DP & PT pulses are intact & regular bilaterally. significant edema but no varicosities noted.  CFT and skin temperature is normal to both lower extremities.       Neurologic: Lower extremity sensation is diminished to both feet.      Musculoskeletal: Patient is ambulatory without assistive device or brace.  No gross ankle deformity noted.  No foot or ankle joint effusion is noted.     IMAGING: MrI left foot - Abnormal bone marrow edema in the proximal phalanx of the great  toe. There are prominent first MTP joint degenerative changes and this  edema could be degenerative in nature but is slightly more prominent  than typically seen and osteomyelitis could be present in the proximal  phalanx of the great toe as well.  2. There is abnormal bone marrow edema in the distal phalanx of the  third toe with surrounding soft tissue edema suspicious for  osteomyelitis.  3. Diffuse subcutaneous edema consistent with cellulitis. No evidence  of abscess.     ASSESSMENT: 73 yr old diabetic male with ulceration left 3rd toe, osteomyelitis, venous insufficiency     PLAN:  Reviewed patient's chart in epic.  -reviewed mri results with patient.   -given the osteomyelitis, recommend amputation of the left 3rd toe.  -no correlation clinically with 1st metatarsal phalangeal joint marrow enhancement, likely from arthritis.  -Talked about risks including infection, numbness, continued pain, need for further surgery, blood loss, blood clotting. You will scar. Patient wishes to proceed with surgery.         Ayaka Azevedo,  DPM, Podiatry/Foot and Ankle Surgery    2:50 PM

## 2018-10-15 NOTE — PROGRESS NOTES
/72 (BP Location: Left arm)  Pulse 98  Temp 99.5  F (37.5  C) (Oral)  Resp 18  Ht 1.829 m (6')  Wt 116.6 kg (257 lb)  SpO2 92%  BMI 34.86 kg/m2  Lungs: clear, encouraged CDB and IS  BS: BM yesterday, positive, passing flatus, NPO since breakfast.   Urine: voids adequate amount of urine  CMS: intact  Dressing: dressing CDI  Pain: no pain   Activity: up with Ax1, tolerates sitting up in chair for meals.  IV antibiotic started last BS 95. IV vancomycin sent with patient to be administered in surgery.   Plan: Will continue to monitor.

## 2018-10-16 ENCOUNTER — HOME INFUSION (PRE-WILLOW HOME INFUSION) (OUTPATIENT)
Dept: PHARMACY | Facility: CLINIC | Age: 73
End: 2018-10-16

## 2018-10-16 ENCOUNTER — APPOINTMENT (OUTPATIENT)
Dept: GENERAL RADIOLOGY | Facility: CLINIC | Age: 73
DRG: 853 | End: 2018-10-16
Attending: INTERNAL MEDICINE
Payer: COMMERCIAL

## 2018-10-16 LAB
ANION GAP SERPL CALCULATED.3IONS-SCNC: 7 MMOL/L (ref 3–14)
BUN SERPL-MCNC: 8 MG/DL (ref 7–30)
CALCIUM SERPL-MCNC: 8.6 MG/DL (ref 8.5–10.1)
CHLORIDE SERPL-SCNC: 103 MMOL/L (ref 94–109)
CO2 SERPL-SCNC: 24 MMOL/L (ref 20–32)
CREAT SERPL-MCNC: 1.01 MG/DL (ref 0.66–1.25)
GFR SERPL CREATININE-BSD FRML MDRD: 72 ML/MIN/1.7M2
GLUCOSE BLDC GLUCOMTR-MCNC: 135 MG/DL (ref 70–99)
GLUCOSE BLDC GLUCOMTR-MCNC: 142 MG/DL (ref 70–99)
GLUCOSE BLDC GLUCOMTR-MCNC: 153 MG/DL (ref 70–99)
GLUCOSE BLDC GLUCOMTR-MCNC: 154 MG/DL (ref 70–99)
GLUCOSE BLDC GLUCOMTR-MCNC: 162 MG/DL (ref 70–99)
GLUCOSE SERPL-MCNC: 155 MG/DL (ref 70–99)
LACTATE BLD-SCNC: 1.1 MMOL/L (ref 0.7–2)
POTASSIUM SERPL-SCNC: 3.8 MMOL/L (ref 3.4–5.3)
SODIUM SERPL-SCNC: 134 MMOL/L (ref 133–144)
VANCOMYCIN SERPL-MCNC: 19.5 MG/L

## 2018-10-16 PROCEDURE — 25000128 H RX IP 250 OP 636: Performed by: INTERNAL MEDICINE

## 2018-10-16 PROCEDURE — 36415 COLL VENOUS BLD VENIPUNCTURE: CPT | Performed by: INTERNAL MEDICINE

## 2018-10-16 PROCEDURE — 99233 SBSQ HOSP IP/OBS HIGH 50: CPT | Performed by: INTERNAL MEDICINE

## 2018-10-16 PROCEDURE — 25000131 ZZH RX MED GY IP 250 OP 636 PS 637: Performed by: INTERNAL MEDICINE

## 2018-10-16 PROCEDURE — 12000000 ZZH R&B MED SURG/OB

## 2018-10-16 PROCEDURE — 25000132 ZZH RX MED GY IP 250 OP 250 PS 637: Performed by: INTERNAL MEDICINE

## 2018-10-16 PROCEDURE — 80048 BASIC METABOLIC PNL TOTAL CA: CPT | Performed by: INTERNAL MEDICINE

## 2018-10-16 PROCEDURE — 71045 X-RAY EXAM CHEST 1 VIEW: CPT

## 2018-10-16 PROCEDURE — 80202 ASSAY OF VANCOMYCIN: CPT | Performed by: INTERNAL MEDICINE

## 2018-10-16 PROCEDURE — 83605 ASSAY OF LACTIC ACID: CPT | Performed by: PODIATRIST

## 2018-10-16 PROCEDURE — 00000146 ZZHCL STATISTIC GLUCOSE BY METER IP

## 2018-10-16 PROCEDURE — 36415 COLL VENOUS BLD VENIPUNCTURE: CPT | Performed by: PODIATRIST

## 2018-10-16 RX ORDER — FUROSEMIDE 10 MG/ML
20 INJECTION INTRAMUSCULAR; INTRAVENOUS ONCE
Status: COMPLETED | OUTPATIENT
Start: 2018-10-16 | End: 2018-10-16

## 2018-10-16 RX ORDER — HYDRALAZINE HYDROCHLORIDE 20 MG/ML
10 INJECTION INTRAMUSCULAR; INTRAVENOUS EVERY 4 HOURS PRN
Status: DISCONTINUED | OUTPATIENT
Start: 2018-10-16 | End: 2018-10-22 | Stop reason: HOSPADM

## 2018-10-16 RX ORDER — NALOXONE HYDROCHLORIDE 0.4 MG/ML
.1-.4 INJECTION, SOLUTION INTRAMUSCULAR; INTRAVENOUS; SUBCUTANEOUS
Status: DISCONTINUED | OUTPATIENT
Start: 2018-10-16 | End: 2018-10-16

## 2018-10-16 RX ORDER — FUROSEMIDE 10 MG/ML
20 INJECTION INTRAMUSCULAR; INTRAVENOUS DAILY
Status: COMPLETED | OUTPATIENT
Start: 2018-10-16 | End: 2018-10-17

## 2018-10-16 RX ADMIN — FINASTERIDE 5 MG: 5 TABLET, FILM COATED ORAL at 08:09

## 2018-10-16 RX ADMIN — VANCOMYCIN HYDROCHLORIDE 2000 MG: 10 INJECTION, POWDER, LYOPHILIZED, FOR SOLUTION INTRAVENOUS at 15:40

## 2018-10-16 RX ADMIN — HYDRALAZINE HYDROCHLORIDE 10 MG: 20 INJECTION INTRAMUSCULAR; INTRAVENOUS at 03:43

## 2018-10-16 RX ADMIN — LISINOPRIL 20 MG: 20 TABLET ORAL at 08:09

## 2018-10-16 RX ADMIN — INSULIN ASPART 1 UNITS: 100 INJECTION, SOLUTION INTRAVENOUS; SUBCUTANEOUS at 07:49

## 2018-10-16 RX ADMIN — ACETAMINOPHEN 650 MG: 325 TABLET, FILM COATED ORAL at 17:49

## 2018-10-16 RX ADMIN — LORATADINE 10 MG: 10 TABLET ORAL at 22:00

## 2018-10-16 RX ADMIN — CEFEPIME HYDROCHLORIDE 1 G: 1 INJECTION, POWDER, FOR SOLUTION INTRAMUSCULAR; INTRAVENOUS at 18:14

## 2018-10-16 RX ADMIN — VANCOMYCIN HYDROCHLORIDE 2000 MG: 10 INJECTION, POWDER, LYOPHILIZED, FOR SOLUTION INTRAVENOUS at 06:30

## 2018-10-16 RX ADMIN — INSULIN ASPART 1 UNITS: 100 INJECTION, SOLUTION INTRAVENOUS; SUBCUTANEOUS at 18:07

## 2018-10-16 RX ADMIN — ASPIRIN 325 MG ORAL TABLET 325 MG: 325 PILL ORAL at 08:09

## 2018-10-16 RX ADMIN — TAMSULOSIN HYDROCHLORIDE 0.4 MG: 0.4 CAPSULE ORAL at 08:09

## 2018-10-16 RX ADMIN — FUROSEMIDE 20 MG: 10 INJECTION, SOLUTION INTRAVENOUS at 10:04

## 2018-10-16 RX ADMIN — CEFEPIME HYDROCHLORIDE 1 G: 1 INJECTION, POWDER, FOR SOLUTION INTRAMUSCULAR; INTRAVENOUS at 04:52

## 2018-10-16 RX ADMIN — FUROSEMIDE 20 MG: 10 INJECTION, SOLUTION INTRAVENOUS at 03:08

## 2018-10-16 ASSESSMENT — ACTIVITIES OF DAILY LIVING (ADL)
ADLS_ACUITY_SCORE: 9

## 2018-10-16 NOTE — PROGRESS NOTES
/58  Pulse 98  Temp 99.4  F (37.4  C) (Oral)  Resp 18  Ht 1.829 m (6')  Wt 116.6 kg (257 lb)  SpO2 93%  BMI 34.86 kg/m2  Lungs: clear, encouraged CDB and IS, still requiring oxygen at 3L.   BS: positive, passing flatus, tolerating regular diet  Urine: voids adequate amount of urine in  The urinal.   CMS: intact  Dressing: dressing CDI new dressing in place podiatrist,  Refused PCD saying that he can't sleep with it he refused ice  To be applied. Foot elevated in bed.   Pain: no complaints of pain.   Activity: up with Ax1 and walker, tolerates sitting up in chair for meals.  Plan: possible discharge tomorrow from podiatry. Will continue to monitor.

## 2018-10-16 NOTE — PROGRESS NOTES
[facility]  Hospitalist Progress Note  Lars Srinivasan MD 10/16/2018    Reason for Stay (Diagnosis): diabetic foot infection         Assessment and Plan:      Summary of Stay: Lance Ibrahim is a 73 year old diabetic male returned to the ED on 10/14/2018 with recurrent left lower extremity infection. Last hospitalization was in June, 2018 at which time he was treated with Clilndamycin and debridement by Podiatry. In the interim, he appears to have followed up with PANFILO James in August and was thought to have early changes compatible with Charcot Arthropathy.    At this time, the patient indicates that he has had shakes and chills related since about 3 days PTA when he first noted any discomfort in his left third toe.     I note that today, the MRI of the left foot indicates clear osteomyelitis changes in the left third toe distal phalanx. I held the patient's lunch and Dr. Azevedo was able to arrange amputation this afternoon.     Problem List:   1. Left third toe recurrent infection, now with clear evidence of osteomyelitis on MRI and surrounding cellulitis involving the distal foot and ankle. POD #1 s/p disarticulation of the third toe, left foot.   2. At least mild/early sepsis, based on elevated WBC, Borderline LA, fever, tachycardia, DEAN. He was very generously fluid resuscitate in the ED.  3. DEAN resolved with fluids, now with volume overload.   4. DM II, non-insulin dependent. Managed with metformin, 1000 BID  5. HTN. On Lisinopril alone.  6. Acute hypoxic respiratory failure, thought to be multifactorial. Likely due to volume excess, possible component of diastolic v systolic heart failure as well as probable sleep-disordered breathing exacerbated by narcotic medications which are necessary for pain management.      PLAN:  1.  Cont empiric Vanco and change Clinda to Cefepime, based on ID recs.  2.  Was given a dose of lasix early this am for respiratory distress. Repeat now and once  tomorrow as well.     DVT Prophylaxis: Pneumatic Compression Devices  Code Status: Full Code  Discharge Dispo: Home expecgted.  Estimated Disch Date / # of Days until Disch: TBD, likely 1-2 more days.        Interval History (Subjective):      Overnight cross-cover gave Lasix 20 mg x 1 for apparent volume overload.   This am appeared fairly comfortable but tired. Denies pain. No cough, but feeling mildly SPB despite 3 am lasix dose.                   Physical Exam:      Last Vital Signs:  /73  Pulse 98  Temp 99.1  F (37.3  C) (Oral)  Resp 20  Ht 1.829 m (6')  Wt 116.6 kg (257 lb)  SpO2 94%  BMI 34.86 kg/m2    I/O last 3 completed shifts:  In: 2163 [P.O.:480; I.V.:1683]  Out: 2975 [Urine:2975]    Constitutional: Awake, alert, cooperative, no apparent distress. Wearing facemask wth oxygen running at 4 LPM and O2 sat > 92%   Respiratory: Clear to auscultation bilaterally, no crackles or wheezing   Cardiovascular: Regular rate and rhythm, normal S1 and S2, and no murmur noted   Abdomen: Normal bowel sounds, soft, non-distended, non-tender   Skin: No rashes, no cyanosis, dry to touch   Neuro: Alert and oriented x3, no weakness, numbness, memory loss   Extremities: Dense edema bilat LEs. Left third toe is large with evident underlying bruising and erythema consistent with deep infection. Purulent discharge noted over the distal aspect of the toe.   Other(s):        All other systems: Negative          Medications:      All current medications were reviewed with changes reflected in problem list.         Data:      All new lab and imaging data was reviewed.   Labs/Imaging:  Results for orders placed or performed during the hospital encounter of 10/14/18 (from the past 24 hour(s))   Glucose by meter   Result Value Ref Range    Glucose 90 70 - 99 mg/dL   Glucose by meter   Result Value Ref Range    Glucose 95 70 - 99 mg/dL   Anaerobic bacterial culture   Result Value Ref Range    Specimen Description Left Foot      Special Requests Received in anaerobic tubes.     Culture Micro PENDING    Gram stain   Result Value Ref Range    Specimen Description Left Foot     Special Requests Specimen collected in eSwab transport (white cap)     Gram Stain No organisms seen     Gram Stain No WBC's seen    Tissue Culture Aerobic Bacterial   Result Value Ref Range    Specimen Description Left Foot     Special Requests Specimen collected in eSwab transport (white cap)     Culture Micro PENDING    Glucose by meter   Result Value Ref Range    Glucose 117 (H) 70 - 99 mg/dL   Glucose by meter   Result Value Ref Range    Glucose 171 (H) 70 - 99 mg/dL   Glucose by meter   Result Value Ref Range    Glucose 142 (H) 70 - 99 mg/dL   Lactic acid level STAT for sepsis protocol   Result Value Ref Range    Lactate for Sepsis Protocol 1.1 0.7 - 2.0 mmol/L   XR Chest Port 1 View    Narrative    CHEST SINGLE VIEW PORTABLE  10/16/2018 3:45 AM     HISTORY: Dyspnea. Hypoxia.    COMPARISON: None.    FINDINGS: Moderately increased interstitial opacities in both lungs.  Mild cardiomegaly. Atherosclerotic calcification in the thoracic  aorta. Two surgical screws in the left humeral head.      Impression    IMPRESSION: Moderately increased interstitial opacities in both lungs,  likely representing interstitial pulmonary edema.    NATALIE MADRIGAL MD   Basic metabolic panel   Result Value Ref Range    Sodium 134 133 - 144 mmol/L    Potassium 3.8 3.4 - 5.3 mmol/L    Chloride 103 94 - 109 mmol/L    Carbon Dioxide 24 20 - 32 mmol/L    Anion Gap 7 3 - 14 mmol/L    Glucose 155 (H) 70 - 99 mg/dL    Urea Nitrogen 8 7 - 30 mg/dL    Creatinine 1.01 0.66 - 1.25 mg/dL    GFR Estimate 72 >60 mL/min/1.7m2    GFR Estimate If Black 87 >60 mL/min/1.7m2    Calcium 8.6 8.5 - 10.1 mg/dL   Glucose by meter   Result Value Ref Range    Glucose 154 (H) 70 - 99 mg/dL

## 2018-10-16 NOTE — OP NOTE
Procedure Date: 10/15/2018      DATE OF SERVICE:  10/15/2018      SURGEON:  Ayaka Azevedo DPM      PREOPERATIVE DIAGNOSES:   1.  Diabetic with neuropathy.   2.  Ulceration, left third toe.   3.  Osteomyelitis, left third toe.   4.  Cellulitis, left foot.      POSTOPERATIVE DIAGNOSES:   1.  Diabetic with neuropathy.   2.  Ulceration, left third toe.   3.  Osteomyelitis, left third toe.   4.  Cellulitis, left foot.      PROCEDURES:  Left third toe amputation.      ANESTHESIA:  MAC with local.        HEMOSTASIS:  Electrocautery.      ESTIMATED BLOOD LOSS:  Less than 10 mL.      SPECIMENS:  Left third toe for pathology.      MATERIALS:  None.      INDICATIONS:  Mr. Ibrahim is a 73-year-old diabetic male that presented to the Emergency Department with redness and swelling of the left third toe.  He states he noticed it a few days ago.  He is diabetic.  Was having some chills the other day, which is what brought him in.  He denies injury to the area.  On physical exam, patient's pulses are palpable.  He does have ulceration to the distal aspect of the left third toe that probes to bone.  MRI indicates osteomyelitis of left third toe.  It was discussed with patient to remove the left third toe to get the infection under control.  Risks, benefits and complications were discussed with the patient.  No guarantees were made.  He wishes to proceed with surgery.      OPERATIVE PROCEDURE 1:  The patient was taken to the operating room, placed on the operating table in a supine position.  Anesthesia was administered and local was injected.  The foot was prepped and draped using sterile technique.  Attention was directed to the left third toe.  A fishmouth incision was made at the base of the left third toe full thickness down to bone.  The skin was sharply dissected off of the base of the proximal phalanx and the toe was disarticulated at the metatarsophalangeal joint.  Swab cultures were obtained at the distal aspect of the toe and  then the toe was sent for pathology.  No further purulent drainage or necrotic tissue was noted.  All bone infection was felt to have been removed.  The wound was flushed with copious amounts of triple antibiotic solution.  Skin was reapproximated using 4-0 Prolene and the foot was placed in a dry sterile dressing.  The patient tolerated the procedure and anesthesia well and was transferred to recovery with vital signs stable and vascular status intact.      PLAN:  The patient will be minimal weightbearing in a postop boot.  Will do a dressing change tomorrow and if looks stable, could possibly be discharged from the hospital.         SANDRINE RANDHAWA DPM             D: 10/15/2018   T: 10/16/2018   MT: CASSI      Name:     CURTIS SAHU   MRN:      [id number]        Account:        [id number]   :      1945           Procedure Date: 10/15/2018      Document: [id number]

## 2018-10-16 NOTE — PROGRESS NOTES
Foot & Ankle Surgery  October 16, 2018    Patient seen at bedside this PM POD#1 sp L 3rd toe amputation for osteomyelitis.  Pain levels minimal.    /58  Pulse 98  Temp 99.4  F (37.4  C) (Oral)  Resp 18  Ht 6' (1.829 m)  Wt 257 lb (116.6 kg)  SpO2 93%  BMI 34.86 kg/m2    PE - sutures intact, some bleeding from incision, some gapping/dusky changes noted.    Cultures -   Specimen Description 10/15/2018  4:15    Left Foot   Special Requests 10/15/2018  4:15 PM 74   Specimen collected in eSwab transport (white cap)   Culture Micro (Abnormal) 10/15/2018  4:15    Moderate growth   Staphylococcus aureus     Anaerobic - NGTD    A/P - 74 yo neuropathic DMII male POD#1 sp L 3rd toe amputation  -continue broad-spectrum IV abx coverage  -dressing change today  -heel WB in surgical shoe; PT for heel WB training  -Dr Mckay to follow up tomorrow.  Ok for discharge if wound is stable; discharge orders placed by Dr Jolly Whittington, DPM FACFAS FACFAOM  Podiatric Foot & Ankle Surgeon  [facility]  [phone]

## 2018-10-16 NOTE — PROGRESS NOTES
Therapy: IV abx  Insurance: [employer]  Ded: $3200   Met: $3200      Co-Insurance: 80%  Max Out of Pocket: $6350  Met: $6350    In reference to admission on 10/14/18 to check IV abx coverage    Please contact Intake with any questions, [phone] or In Basket pool, [facility] ([phone]).

## 2018-10-16 NOTE — CONSULTS
Consult Date:  10/15/2018      INFECTIOUS DISEASE CONSULTATION      LOCATION:  [facility].        REFERRING PHYSICIAN:  Dr. Pinzon.      IMPRESSION:   1.  73-year-old diabetic male with acute left leg and foot cellulitis, definite concern for third toe osteomyelitis with a grossly abnormal toe, some systemic toxicity with blood cultures pending.   2.  ANAPHYLACTIC PENICILLIN ALLERGY.   3.  Diabetes mellitus.   4.  Mild renal insufficiency, improved.   5.  Prior history of foot and leg cellulitis.      RECOMMENDATIONS:   1.  Agree with vancomycin and cefepime for now.   2.  Await operative findings, going to the OR per Dr. Azevedo currently.  Amount of antibiotics depending on findings, cultures, etc.      HISTORY OF PRESENT ILLNESS:  This 73-year-old male was seen in consultation due to a left diabetic foot infection including some degree of cellulitis into the leg and definite third toe major deep infection.  The patient has had some systemic toxicity.  Blood cultures are pending.  He has had prior similar infection responded to antibiotics.  He otherwise feels okay at present.  No other major pain sites.      PAST MEDICAL HISTORY:  Diabetes, control level unclear, slightly abnormal creatinine currently.  History of Flomax and finasteride needed for prostate disease.      ALLERGIES:  PENICILLIN AND SULFA, BOTH WITH PRIOR RASHES.      SOCIAL AND FAMILY HISTORY:  No known resistant pathogens.  No recent travels or exposures.      MEDICATIONS:  Meds as listed.      REVIEW OF SYSTEMS:  Definite systemic symptoms improved currently.  Leg is mildly painful, but not severe.  Some discomfort into the thigh and higher as well.      PHYSICAL EXAMINATION:   GENERAL:  The patient appears his stated age.  He looks relatively well.     VITAL SIGNS:  He is afebrile at present, but earlier temperature elevation.  Pulse of 90.   HEENT:  No thrush or oropharyngeal.  Pupils reactive.   NECK:  Supple and nontender.  No  lymphadenopathy, no thyromegaly.   HEART AND LUNGS:  Unremarkable.   ABDOMEN:  Soft and nontender.   EXTREMITIES:  Bilateral slight edema.  Left leg erythema and foot erythema of the third toe was grossly abnormal.  Very likely major deep infection.      LABORATORY DATA:  Cultures are pending.      OPERATIVE FINDINGS:  Pending.      Thank you very much for consultation.  I will follow the patient with you.         GREG TADEO MD             D: 10/15/2018   T: 10/15/2018   MT: NTS      Name:     CURTIS SAHU   MRN:      [id number]        Account:       [id number]   :      1945           Consult Date:  10/15/2018      Document: [id number]       cc: Anh Pnizon MD

## 2018-10-16 NOTE — PROGRESS NOTES
Pt found by Nursing assistant and staff nurse sob after using urinal. Without o2, sats 67%. Lung sound exp wheezing.  Pt noted he felt fluid in lungs. md paged and new orders obtained.

## 2018-10-16 NOTE — PHARMACY-VANCOMYCIN DOSING SERVICE
Pharmacy Vancomycin Note  Date of Service 2018  Patient's  1945   73 year old, male    Indication: Osteomyelitis  Goal Trough Level: 15-20 mg/L  Day of Therapy: 2  Current Vancomycin regimen:  2000 mg IV q12h    Current estimated CrCl = Estimated Creatinine Clearance: 84.6 mL/min (based on Cr of 1.01).    Creatinine for last 3 days  10/14/2018:  2:43 PM Creatinine 1.28 mg/dL  10/15/2018:  6:02 AM Creatinine 0.92 mg/dL  10/16/2018:  6:01 AM Creatinine 1.01 mg/dL    Recent Vancomycin Levels (past 3 days)  10/16/2018:  3:00 PM Vancomycin Level 19.5 mg/L    Vancomycin IV Administrations (past 72 hours)                   vancomycin (VANCOCIN) 2,000 mg in sodium chloride 0.9 % 500 mL intermittent infusion (mg) 2,000 mg Given 10/16/18 1540     2,000 mg Given  0630     2,000 mg Given 10/15/18 0405    vancomycin (VANCOCIN) 1,500 mg in sodium chloride 0.9 % 250 mL intermittent infusion (mg) 1,500 mg New Bag 10/14/18 1525                Nephrotoxins and other renal medications (Future)    Start     Dose/Rate Route Frequency Ordered Stop    10/16/18 1000  furosemide (LASIX) injection 20 mg      20 mg  over 1-2 Minutes Intravenous DAILY 10/16/18 0933 10/18/18 0759    10/15/18 0900  lisinopril (PRINIVIL/ZESTRIL) tablet 20 mg      20 mg Oral DAILY 10/14/18 1730      10/15/18 0400  vancomycin (VANCOCIN) 2,000 mg in sodium chloride 0.9 % 500 mL intermittent infusion      2,000 mg  over 2 Hours Intravenous EVERY 12 HOURS 10/14/18 1745               Contrast Orders - past 72 hours (72h ago through future)    Start     Dose/Rate Route Frequency Ordered Stop    10/15/18 0845  gadobutrol (GADAVIST) injection 15 mL      15 mL Intravenous ONCE 10/15/18 0832 10/15/18 0919          Interpretation of levels and current regimen:  Trough level is  Therapeutic    Has serum creatinine changed > 50% in last 72 hours: No    Urine output:  good urine output    Renal Function: Stable    Plan:  1.  Continue Current Dose  2.   Pharmacy will check trough levels as appropriate in 5-7 Days.    3. Serum creatinine levels will be ordered daily while on other nephrotoxins.      Irene Kenney        .

## 2018-10-16 NOTE — CONSULTS
Care Transitions Team: Following for CC, discharge planning, and disposition.      Per chart review pt. Is identified as Elevated lola as previously addressed.   S/P Left third toe amputation 10/15, ID consulted and following     Home Infusion Benefit check initiated if needed,   Pt has [employer] primary to Medicare with a ded $3200 (all met), then 80% up to max OOP $6350 (all met). Pt is covered at 100% for IV abx.     Will be available if needed, on going assessment for PCP follow up /handoff needs     Estee Bunch RN, BSN, Care Transitions Specialist   Care Transitions Team  [phone]

## 2018-10-16 NOTE — PROGRESS NOTES
Notified by RN for dyspnea and wheezing with hypoxia.  IV fluid stopped and was given Lasix 20 mg IV.  Chest x-ray ordered.  Will monitor

## 2018-10-16 NOTE — PROGRESS NOTES
[facility]  Infectious Disease Progress Note          Assessment and Plan:   IMPRESSION:   1.  73-year-old diabetic male with acute left leg and foot cellulitis, definite concern for third toe osteomyelitis with a grossly abnormal toe, some systemic toxicity with blood cultures pending.   2.  ANAPHYLACTIC PENICILLIN ALLERGY.   3.  Diabetes mellitus.   4.  Mild renal insufficiency, improved.   5.  Prior history of foot and leg cellulitis.       RECOMMENDATIONS:   1.  Continue vancomycin and cefepime for now.   2. Noteoperative findings, .  Amount of antibiotics depending on findings, cultures, etc. So far staph, of note still 101+, evenif foot OK not discharge ready if fever not resolved            Interval History:   no new complaints and doing well; no cp, sob, n/v/d, or abd pain. Mild hypoxia, foot cx staph, toe amp, fever 101+ still              Medications:       aspirin  325 mg Oral Daily     ceFEPIme (MAXIPIME) IV  1 g Intravenous Q12H     finasteride  5 mg Oral Daily     furosemide  20 mg Intravenous Daily     hypromellose-dextran  1 drop Both Eyes QAM     insulin aspart  1-10 Units Subcutaneous TID AC     insulin aspart  1-7 Units Subcutaneous At Bedtime     lisinopril  20 mg Oral Daily     loratadine  10 mg Oral At Bedtime     sodium chloride (PF)  3 mL Intracatheter Q8H     tamsulosin  0.4 mg Oral Daily     vancomycin (VANCOCIN) IV  2,000 mg Intravenous Q12H                  Physical Exam:   Blood pressure 127/56, pulse 98, temperature 99.9  F (37.7  C), temperature source Axillary, resp. rate 18, height 1.829 m (6'), weight 116.6 kg (257 lb), SpO2 90 %.  Wt Readings from Last 2 Encounters:   10/14/18 116.6 kg (257 lb)   08/27/18 118.3 kg (260 lb 14.4 oz)     Vital Signs with Ranges  Temp:  [98.1  F (36.7  C)-101.6  F (38.7  C)] 99.9  F (37.7  C)  Heart Rate:  [] 104  Resp:  [13-28] 18  BP: (117-198)/() 127/56  SpO2:  [90 %-99 %] 90 %    Constitutional: Awake, alert,  cooperative, no apparent distress   Lungs: Clear to auscultation bilaterally, no crackles or wheezing   Cardiovascular: Regular rate and rhythm, normal S1 and S2, and no murmur noted   Abdomen: Normal bowel sounds, soft, non-distended, non-tender   Skin: No rashes, no cyanosis, some edema foot wrapped   Other:           Data:   All microbiology laboratory data reviewed.  Recent Labs   Lab Test  10/15/18   0602  10/14/18   1443  06/20/18   0735   WBC  17.1*  18.0*  8.7   HGB  10.2*  12.2*  11.0*   HCT  32.5*  39.1*  35.5*   MCV  94  92  96   PLT  261  323  224     Recent Labs   Lab Test  10/16/18   0601  10/15/18   0602  10/14/18   1443   CR  1.01  0.92  1.28*     No lab results found.  Recent Labs   Lab Test  10/15/18   1615  10/14/18   1455  10/14/18   1443  06/19/18   1459  06/19/18   1448  05/01/18   1610  05/01/18   1549  05/01/18   1546  05/25/16   1429   CULT  Moderate growth  Staphylococcus aureus  *  Culture in progress  Culture negative monitoring continues  No growth after 2 days  No growth after 2 days  No growth  No growth  No growth  No growth  Light growth  Beta hemolytic Streptococcus group B  *  No growth  No Beta Streptococcus isolated

## 2018-10-16 NOTE — PLAN OF CARE
"Problem: Patient Care Overview  Goal: Plan of Care/Patient Progress Review  Outcome: Improving  Pt. up from PACU at approximately 1745. Denied any pain during shift. Scheduled pain medications only. Blood sugars required no correction. Voiding appropriately in urinal. Moving well and able to sit at edge of bed independently. Some nausea early in shift followed by relief with zofran. Mod carb diet to resume. Ace wrap to left leg is clean, dry, and intact. Fever of 101.5 at approximately 1930. Interventions taken and temp of 99.5 at 2115. Patient stated he felt \"much better.\" Oxymask used to keep saturation above 90%, on 6L with capnography. Tachy during shift, but asymptomatic.       "

## 2018-10-17 ENCOUNTER — APPOINTMENT (OUTPATIENT)
Dept: GENERAL RADIOLOGY | Facility: CLINIC | Age: 73
DRG: 853 | End: 2018-10-17
Attending: PODIATRIST
Payer: COMMERCIAL

## 2018-10-17 ENCOUNTER — APPOINTMENT (OUTPATIENT)
Dept: PHYSICAL THERAPY | Facility: CLINIC | Age: 73
DRG: 853 | End: 2018-10-17
Attending: INTERNAL MEDICINE
Payer: COMMERCIAL

## 2018-10-17 ENCOUNTER — APPOINTMENT (OUTPATIENT)
Dept: CT IMAGING | Facility: CLINIC | Age: 73
DRG: 853 | End: 2018-10-17
Attending: INTERNAL MEDICINE
Payer: COMMERCIAL

## 2018-10-17 ENCOUNTER — APPOINTMENT (OUTPATIENT)
Dept: GENERAL RADIOLOGY | Facility: CLINIC | Age: 73
DRG: 853 | End: 2018-10-17
Attending: INTERNAL MEDICINE
Payer: COMMERCIAL

## 2018-10-17 LAB
BASE EXCESS BLDA CALC-SCNC: 2.6 MMOL/L
COPATH REPORT: NORMAL
CREAT SERPL-MCNC: 1.15 MG/DL (ref 0.66–1.25)
D DIMER PPP FEU-MCNC: 2.6 UG/ML FEU (ref 0–0.5)
ERYTHROCYTE [DISTWIDTH] IN BLOOD BY AUTOMATED COUNT: 15.8 % (ref 10–15)
GFR SERPL CREATININE-BSD FRML MDRD: 62 ML/MIN/1.7M2
GLUCOSE BLDC GLUCOMTR-MCNC: 127 MG/DL (ref 70–99)
GLUCOSE BLDC GLUCOMTR-MCNC: 139 MG/DL (ref 70–99)
GLUCOSE BLDC GLUCOMTR-MCNC: 142 MG/DL (ref 70–99)
GLUCOSE BLDC GLUCOMTR-MCNC: 145 MG/DL (ref 70–99)
GLUCOSE BLDC GLUCOMTR-MCNC: 188 MG/DL (ref 70–99)
GLUCOSE BLDC GLUCOMTR-MCNC: 255 MG/DL (ref 70–99)
HBA1C MFR BLD: 6.4 % (ref 0–5.6)
HCO3 BLD-SCNC: 26 MMOL/L (ref 21–28)
HCT VFR BLD AUTO: 32.4 % (ref 40–53)
HGB BLD-MCNC: 10 G/DL (ref 13.3–17.7)
LACTATE BLD-SCNC: 0.6 MMOL/L (ref 0.7–2)
LACTATE BLD-SCNC: 1.1 MMOL/L (ref 0.7–2)
MCH RBC QN AUTO: 28.8 PG (ref 26.5–33)
MCHC RBC AUTO-ENTMCNC: 30.9 G/DL (ref 31.5–36.5)
MCV RBC AUTO: 93 FL (ref 78–100)
O2/TOTAL GAS SETTING VFR VENT: ABNORMAL %
OXYHGB MFR BLD: 81 % (ref 92–100)
PCO2 BLD: 35 MM HG (ref 35–45)
PH BLD: 7.48 PH (ref 7.35–7.45)
PLATELET # BLD AUTO: 311 10E9/L (ref 150–450)
PO2 BLD: 44 MM HG (ref 80–105)
RBC # BLD AUTO: 3.47 10E12/L (ref 4.4–5.9)
VANCOMYCIN SERPL-MCNC: 23.2 MG/L
WBC # BLD AUTO: 11.3 10E9/L (ref 4–11)

## 2018-10-17 PROCEDURE — 27210300 ZZH CANNULA HIGH FLOW, ADULT

## 2018-10-17 PROCEDURE — 71260 CT THORAX DX C+: CPT

## 2018-10-17 PROCEDURE — 87040 BLOOD CULTURE FOR BACTERIA: CPT | Performed by: INTERNAL MEDICINE

## 2018-10-17 PROCEDURE — 25000128 H RX IP 250 OP 636: Performed by: INTERNAL MEDICINE

## 2018-10-17 PROCEDURE — 85027 COMPLETE CBC AUTOMATED: CPT | Performed by: INTERNAL MEDICINE

## 2018-10-17 PROCEDURE — 83605 ASSAY OF LACTIC ACID: CPT | Performed by: INTERNAL MEDICINE

## 2018-10-17 PROCEDURE — 25000132 ZZH RX MED GY IP 250 OP 250 PS 637: Performed by: INTERNAL MEDICINE

## 2018-10-17 PROCEDURE — 12000000 ZZH R&B MED SURG/OB

## 2018-10-17 PROCEDURE — 73630 X-RAY EXAM OF FOOT: CPT | Mod: LT

## 2018-10-17 PROCEDURE — 99207 ZZC CDG-MDM COMPONENT: MEETS LOW - DOWN CODED: CPT | Performed by: INTERNAL MEDICINE

## 2018-10-17 PROCEDURE — 99232 SBSQ HOSP IP/OBS MODERATE 35: CPT | Performed by: INTERNAL MEDICINE

## 2018-10-17 PROCEDURE — 40000281 ZZH STATISTIC TRANSPORT TIME EA 15 MIN

## 2018-10-17 PROCEDURE — 97530 THERAPEUTIC ACTIVITIES: CPT | Mod: GP | Performed by: PHYSICAL THERAPIST

## 2018-10-17 PROCEDURE — 83036 HEMOGLOBIN GLYCOSYLATED A1C: CPT | Performed by: INTERNAL MEDICINE

## 2018-10-17 PROCEDURE — 36415 COLL VENOUS BLD VENIPUNCTURE: CPT | Performed by: INTERNAL MEDICINE

## 2018-10-17 PROCEDURE — 00000146 ZZHCL STATISTIC GLUCOSE BY METER IP

## 2018-10-17 PROCEDURE — 82565 ASSAY OF CREATININE: CPT | Performed by: INTERNAL MEDICINE

## 2018-10-17 PROCEDURE — 40000275 ZZH STATISTIC RCP TIME EA 10 MIN

## 2018-10-17 PROCEDURE — 40000193 ZZH STATISTIC PT WARD VISIT: Performed by: PHYSICAL THERAPIST

## 2018-10-17 PROCEDURE — 40000983 ZZH STATISTIC HFNC ADULT NON-CPAP

## 2018-10-17 PROCEDURE — 85379 FIBRIN DEGRADATION QUANT: CPT | Performed by: INTERNAL MEDICINE

## 2018-10-17 PROCEDURE — 82805 BLOOD GASES W/O2 SATURATION: CPT | Performed by: INTERNAL MEDICINE

## 2018-10-17 PROCEDURE — 93010 ELECTROCARDIOGRAM REPORT: CPT | Performed by: INTERNAL MEDICINE

## 2018-10-17 PROCEDURE — 80202 ASSAY OF VANCOMYCIN: CPT | Performed by: INTERNAL MEDICINE

## 2018-10-17 PROCEDURE — 93005 ELECTROCARDIOGRAM TRACING: CPT

## 2018-10-17 PROCEDURE — 97162 PT EVAL MOD COMPLEX 30 MIN: CPT | Mod: GP | Performed by: PHYSICAL THERAPIST

## 2018-10-17 PROCEDURE — 71045 X-RAY EXAM CHEST 1 VIEW: CPT

## 2018-10-17 RX ORDER — FUROSEMIDE 10 MG/ML
20 INJECTION INTRAMUSCULAR; INTRAVENOUS DAILY
Status: DISCONTINUED | OUTPATIENT
Start: 2018-10-17 | End: 2018-10-17

## 2018-10-17 RX ORDER — GABAPENTIN 100 MG/1
100 CAPSULE ORAL 2 TIMES DAILY
Status: DISCONTINUED | OUTPATIENT
Start: 2018-10-18 | End: 2018-10-22 | Stop reason: HOSPADM

## 2018-10-17 RX ORDER — CEFAZOLIN SODIUM 1 G/50ML
2000 SOLUTION INTRAVENOUS EVERY 12 HOURS
Status: DISCONTINUED | OUTPATIENT
Start: 2018-10-17 | End: 2018-10-18

## 2018-10-17 RX ORDER — TRAMADOL HYDROCHLORIDE 50 MG/1
50 TABLET ORAL EVERY 6 HOURS PRN
Status: DISCONTINUED | OUTPATIENT
Start: 2018-10-17 | End: 2018-10-22 | Stop reason: HOSPADM

## 2018-10-17 RX ORDER — IOPAMIDOL 755 MG/ML
500 INJECTION, SOLUTION INTRAVASCULAR ONCE
Status: COMPLETED | OUTPATIENT
Start: 2018-10-17 | End: 2018-10-17

## 2018-10-17 RX ORDER — FUROSEMIDE 10 MG/ML
40 INJECTION INTRAMUSCULAR; INTRAVENOUS
Status: DISCONTINUED | OUTPATIENT
Start: 2018-10-17 | End: 2018-10-18

## 2018-10-17 RX ORDER — GABAPENTIN 100 MG/1
200 CAPSULE ORAL AT BEDTIME
Status: DISCONTINUED | OUTPATIENT
Start: 2018-10-17 | End: 2018-10-22 | Stop reason: HOSPADM

## 2018-10-17 RX ADMIN — IOPAMIDOL 80 ML: 755 INJECTION, SOLUTION INTRAVENOUS at 22:49

## 2018-10-17 RX ADMIN — ACETAMINOPHEN 650 MG: 325 TABLET, FILM COATED ORAL at 17:57

## 2018-10-17 RX ADMIN — CEFEPIME HYDROCHLORIDE 1 G: 1 INJECTION, POWDER, FOR SOLUTION INTRAMUSCULAR; INTRAVENOUS at 17:47

## 2018-10-17 RX ADMIN — FINASTERIDE 5 MG: 5 TABLET, FILM COATED ORAL at 08:27

## 2018-10-17 RX ADMIN — ACETAMINOPHEN 650 MG: 325 TABLET, FILM COATED ORAL at 07:03

## 2018-10-17 RX ADMIN — LISINOPRIL 20 MG: 20 TABLET ORAL at 08:27

## 2018-10-17 RX ADMIN — GABAPENTIN 200 MG: 100 CAPSULE ORAL at 21:40

## 2018-10-17 RX ADMIN — CEFEPIME HYDROCHLORIDE 1 G: 1 INJECTION, POWDER, FOR SOLUTION INTRAMUSCULAR; INTRAVENOUS at 06:56

## 2018-10-17 RX ADMIN — Medication 2.5 MG: at 17:53

## 2018-10-17 RX ADMIN — TAMSULOSIN HYDROCHLORIDE 0.4 MG: 0.4 CAPSULE ORAL at 08:26

## 2018-10-17 RX ADMIN — DEXTRAN 70 AND HYPROMELLOSE 2910 1 DROP: 1; 3 SOLUTION/ DROPS OPHTHALMIC at 08:28

## 2018-10-17 RX ADMIN — INSULIN ASPART 2 UNITS: 100 INJECTION, SOLUTION INTRAVENOUS; SUBCUTANEOUS at 12:54

## 2018-10-17 RX ADMIN — LORATADINE 10 MG: 10 TABLET ORAL at 21:40

## 2018-10-17 RX ADMIN — ENOXAPARIN SODIUM 40 MG: 40 INJECTION SUBCUTANEOUS at 20:44

## 2018-10-17 RX ADMIN — VANCOMYCIN HYDROCHLORIDE 2000 MG: 10 INJECTION, POWDER, LYOPHILIZED, FOR SOLUTION INTRAVENOUS at 08:26

## 2018-10-17 RX ADMIN — ASPIRIN 325 MG ORAL TABLET 325 MG: 325 PILL ORAL at 08:26

## 2018-10-17 RX ADMIN — VANCOMYCIN HYDROCHLORIDE 2000 MG: 10 INJECTION, POWDER, LYOPHILIZED, FOR SOLUTION INTRAVENOUS at 23:12

## 2018-10-17 RX ADMIN — FUROSEMIDE 40 MG: 10 INJECTION, SOLUTION INTRAVENOUS at 17:50

## 2018-10-17 RX ADMIN — SODIUM CHLORIDE 91 ML: 9 INJECTION, SOLUTION INTRAVENOUS at 22:50

## 2018-10-17 RX ADMIN — FUROSEMIDE 20 MG: 10 INJECTION, SOLUTION INTRAVENOUS at 08:26

## 2018-10-17 ASSESSMENT — ACTIVITIES OF DAILY LIVING (ADL)
ADLS_ACUITY_SCORE: 9

## 2018-10-17 NOTE — PROGRESS NOTES
RT Note:    Patient started on HFNC for increasing O2 needs. SpO2 in mid-high 80s on 6LPM oxymask, which only came up to low 90s with higher flow on oxymask. Patient denies shortness of breath. SpO2 decreases more when he is asleep, but patient stated he does not wear CPAP at home and has worn it once during a different admission to the hospital and could not tolerate it. HFNC settings currently FiO2 60% and 40LPM. SpO2 91-96%. RT will continue to monitor.    Nalini Davila  October 17, 2018.12:31 AM

## 2018-10-17 NOTE — PROGRESS NOTES
Respiratory Therapy    Patient was taken off of HFNC to a 3LPM Nc, SpO2 95%. Will continue to assess and monitor.    Peace Herrera RRT  10/17/2018

## 2018-10-17 NOTE — PROGRESS NOTES
[facility]  Infectious Disease Progress Note          Assessment and Plan:   IMPRESSION:   1.  73-year-old diabetic male with acute left leg and foot cellulitis, definite concern for third toe osteomyelitis with a grossly abnormal toe, some systemic toxicity with blood cultures pending.   2.  ANAPHYLACTIC PENICILLIN ALLERGY.   3.  Diabetes mellitus.   4.  Mild renal insufficiency, improved.   5.  Prior history of foot and leg cellulitis.       RECOMMENDATIONS:   1.  Continue vancomycin and cefepime for now.   2. Note operative findings, ., cultures So far staph, of note still 100+, even if foot OK not discharge ready with fever not resolved rec heck WBC, adjust abx to cx            Interval History:   no new complaints and doing well; no cp, sob, n/v/d, or abd pain. Mild hypoxia, foot cx staph only sens pending, toe amp, fever 100+ still              Medications:       aspirin  325 mg Oral Daily     ceFEPIme (MAXIPIME) IV  1 g Intravenous Q12H     finasteride  5 mg Oral Daily     hypromellose-dextran  1 drop Both Eyes QAM     insulin aspart  1-10 Units Subcutaneous TID AC     insulin aspart  1-7 Units Subcutaneous At Bedtime     lisinopril  20 mg Oral Daily     loratadine  10 mg Oral At Bedtime     sodium chloride (PF)  3 mL Intracatheter Q8H     tamsulosin  0.4 mg Oral Daily     vancomycin (VANCOCIN) IV  2,000 mg Intravenous Q12H                  Physical Exam:   Blood pressure 133/74, pulse 98, temperature 98.6  F (37  C), resp. rate 20, height 1.829 m (6'), weight 116.6 kg (257 lb), SpO2 95 %.  Wt Readings from Last 2 Encounters:   10/14/18 116.6 kg (257 lb)   08/27/18 118.3 kg (260 lb 14.4 oz)     Vital Signs with Ranges  Temp:  [98.2  F (36.8  C)-100.5  F (38.1  C)] 98.6  F (37  C)  Heart Rate:  [100-114] 100  Resp:  [18-24] 20  BP: (125-142)/(55-75) 133/74  FiO2 (%):  [50 %-60 %] 60 %  SpO2:  [88 %-97 %] 95 %    Constitutional: Awake, alert, cooperative, no apparent distress   Lungs:  Clear to auscultation bilaterally, no crackles or wheezing   Cardiovascular: Regular rate and rhythm, normal S1 and S2, and no murmur noted   Abdomen: Normal bowel sounds, soft, non-distended, non-tender   Skin: No rashes, no cyanosis, some edema foot wrapped   Other:           Data:   All microbiology laboratory data reviewed.  Recent Labs   Lab Test  10/15/18   0602  10/14/18   1443  06/20/18   0735   WBC  17.1*  18.0*  8.7   HGB  10.2*  12.2*  11.0*   HCT  32.5*  39.1*  35.5*   MCV  94  92  96   PLT  261  323  224     Recent Labs   Lab Test  10/17/18   0709  10/16/18   0601  10/15/18   0602   CR  1.15  1.01  0.92     No lab results found.  Recent Labs   Lab Test  10/15/18   1615  10/14/18   1455  10/14/18   1443  06/19/18   1459  06/19/18   1448  05/01/18   1610  05/01/18   1549  05/01/18   1546  05/25/16   1429   CULT  Moderate growth  Staphylococcus aureus  Susceptibility testing in progress  *  Culture negative monitoring continues  No growth after 3 days  No growth after 3 days  No growth  No growth  No growth  No growth  Light growth  Beta hemolytic Streptococcus group B  *  No growth  No Beta Streptococcus isolated

## 2018-10-17 NOTE — PROGRESS NOTES
Respiratory Therapy Note        Pt restarted on HFNC due to decreased SPO2 and flushed appearence with SOB.  Started at 50 Lpm 60% with Improved symptoms.    October 17, 2018 6:33 PM  Luisito Matamoros

## 2018-10-17 NOTE — PLAN OF CARE
Problem: Patient Care Overview  Goal: Plan of Care/Patient Progress Review    PT:  Eval complete, treatment initiated.  Pt seen POD #1 s/p L 3rd toe amputation for treatment of osteomyelitis.  At baseline, pt functions independently, is the primary caregiver for his spouse.  Lives in a multi-level home with stairs present.  Baseline R knee pain/weakness in addition to limited L shoulder function, previous L shoulder surgery with limited ROM and states he is due for a revision TKA in January.    Discharge Planner PT   Patient plan for discharge: home, as he is primary caregiver for his spouse who is on permanent disability  Current status: Increased O2 needs, 3Lpm at rest dropping to 85%, increased to 4-5Lpm with activity.  R knee pain and baseline weakness, L shoulder weakness at baseline limiting current functional mobility tolerance.  Dr. Mckay in room during eval, providing verbal orders for WBAT with Cam Boot, just to avoid push off stressing forefoot surgical area.  Pt requires mod A with bed mobility, max A x 2 to stand at bedside with FWW. Only able to tolerate a few steps at bedside due to weakness and R knee pain.  Barriers to return to prior living situation: very limited mobility, O2 needs, stairs within home, unable to ambulate yet, caregiver for spouse  Recommendations for discharge: TCU  Rationale for recommendations: Currently functioning well below baseline, will need ongoing skilled PT intervention to improve independence and safety with functional mobility skills prior to returning home.  Unlikely to reach sufficient mobility goals in next 1-2 days to discharge home, but will continue to reassess 10/18. Requested SW consult.       Entered by: Woo Puri 10/17/2018 5:19 PM

## 2018-10-17 NOTE — PLAN OF CARE
"Problem: Patient Care Overview  Goal: Plan of Care/Patient Progress Review  Outcome: No Change  Vitals: /75 (BP Location: Left arm)  Pulse 98  Temp 99.6  F (37.6  C) (Oral)  Resp 22  Ht 1.829 m (6')  Wt 116.6 kg (257 lb)  SpO2 94%  BMI 34.86 kg/m2  Situation/Status: POD#1 sp L 3rd toe amputation for osteomyelitis.  Neuro: A/O x 4  Pain: Denies pain  Activity: Not out of bed yet. PT needs to see PT, sits on side of bed to use urinal, freq voiding 100 ml.  Diet: Reg diet  Tele/Cardiac: NSR  Lungs: GV-SOQ-[id number], O2 needs high, at 6LPM oxymask in 80\", paged RT to assess. RT asked to get MD order for High Flow oxygen. On 60% most of the night. Pt continued alarming most of the night under 90% at times d/t possible apneic phases. RT cont to monitor.    GI/: No nausea/vomiting, + Flautus, BS x4.  Skin: CMS intact, edema mild to mod in BLE  Dressings: CDI  Lines/drains: IV-SL  Labs:  Lactic 0.6- Asked MD to order d/t tachy, fever, and O2 needs  Plan: Monitor symptoms of infection and sepsis. Continue to monitor per POC.        "

## 2018-10-17 NOTE — PROVIDER NOTIFICATION
Text paged MD pt flushed, SOB, 5L oxymask 90-95%, temp 101.2, -120s, lactic triggered    Spoke with Dr. Srinivasan- he is adding labs to lactic draw. Respiratory paged to come advise on pt.

## 2018-10-17 NOTE — PLAN OF CARE
Problem: Patient Care Overview  Goal: Plan of Care/Patient Progress Review  Pt is voiding. Pt has denied pain this shift.  Pt is up with walker.   Pt is on 3 L nasal canula.  Pt states he feels SOB on exertion .pt has been afebrile  this shift.

## 2018-10-17 NOTE — DISCHARGE INSTRUCTIONS
A Hospital Discharge follow up with Dr. Anh Spivey has been scheduled for you on Tuesday October 23rd at 2:40pm

## 2018-10-17 NOTE — PROGRESS NOTES
[facility]    No concerns. Had some good questions regarding the dressing and follow up.    Exam:  B/P: 133/74, T: 98.6, P: 98, R: 20    Left foot:    Surgical site stable  Incision coapted  Edema less  Light  Erythema.    Bone Culture:  Aerobic:  Staph aureus - susceptibility pending  Anaerobic: no growth to date  Pathology:  Abscess and osteomyelitis.    Assessment:  73-year-old male with type 2 DM, peripheral neuropathy status post left 3rd toe amputation for treatment of osteomyelitis.     All bone infection was likely removed.    Plan:   Discharge antibiotics per ID; cultures pending  Okay from Podiatry standpoint to discharge.  Dr. Azevedo placed discharge orders, but please page me if there are questions  Dressing placed today can stay on until clinic follow up with Dr. Azevedo  Will order post op XR - new baseline    Podiatry will sign off.    Edwin Mckay DPM, FACFAS, MS  [facility]  [phone]

## 2018-10-17 NOTE — PROGRESS NOTES
10/17/18 1618   Quick Adds   Type of Visit Initial PT Evaluation   Living Environment   Lives With spouse  (pt reports she is permanently disabled )   Living Arrangements house   Home Accessibility stairs to enter home;stairs within home   Number of Stairs to Enter Home 5   Number of Stairs Within Home 12   Stair Railings at Home inside, present at both sides   Transportation Available family or friend will provide   Living Environment Comment is permanent caretaker for his wife, no one else to assist at home    Self-Care   Usual Activity Tolerance good   Current Activity Tolerance poor   Regular Exercise no   Equipment Currently Used at Home none  (owns 4ww, cane, crutches )   Activity/Exercise/Self-Care Comment indep in self cares and mobility, states he works full time     Functional Level Prior   Ambulation 0-->independent   Transferring 0-->independent   Toileting 0-->independent   Bathing 0-->independent   Dressing 0-->independent   Eating 0-->independent   Communication 0-->understands/communicates without difficulty   Swallowing 0-->swallows foods/liquids without difficulty   Cognition 0 - no cognition issues reported   General Information   Onset of Illness/Injury or Date of Surgery - Date 10/14/18   Referring Physician Lars Srinivasan MD   Patient/Family Goals Statement return home to take care of his wife    Pertinent History of Current Problem (include personal factors and/or comorbidities that impact the POC) 73-year-old male with type 2 DM, peripheral neuropathy status post left 3rd toe amputation for treatment of osteomyelitis. PMH of HTN and TIA    Precautions/Limitations fall precautions   Weight-Bearing Status - LLE (heel WB in surgical shoe. )   General Info Comments verbal orders from Dr. Mckay during eval, okay to WBAT in  cam boot, just not forefoot push off    Cognitive Status Examination   Orientation orientation to person, place and time   Level of Consciousness alert   Follows Commands and  Answers Questions 100% of the time   Pain Assessment   Patient Currently in Pain Yes, see Vital Sign flowsheet  (none in foot, significant pain in R knee with sit <> stand )   Integumentary/Edema   Integumentary/Edema Comments dressing intact, ace wrap in place    Posture    Posture Forward head position;Protracted shoulders   Range of Motion (ROM)   ROM Comment WFL R UE,  L  UE cannot abduct of FLX past 90 due  to previous  surgery. R LE limited  knee  FLX due to knee pain. L LE WFL    Strength   Strength Comments WFL R UE, L LE  limited  due  to previous surgery, R LE limited  by pain, unable  to SLR, small  SLR with L LE     Bed Mobility   Bed Mobility Comments supine <> sit with  mod A x 1, needs assistance sitting up and getting legs off EOB, uses rail    Transfer Skills   Transfer Comments sit <> stand  max A x 2 with bed raised, unable to  stand from lower  height and  max A x 2    Gait   Gait Comments unable to amb at this  time due to pain,weakness, and SOB    Balance   Balance Comments relies heavily on B UE support  and max A x 2, lateral shifts in standing, needing support    Sensory Examination   Sensory Perception Comments reports sensation in both feet and  no numbness/tingling, however  reports no pain in  L LE,  suggesting  sensory loss and peripheral neuropathy. No reported numbness/tingling/sensory deficits in hands     General Therapy Interventions   Planned Therapy Interventions balance training;bed mobility training;gait training;neuromuscular re-education;orthotic fitting/training;ROM;strengthening;transfer training;risk factor education;home program guidelines;progressive activity/exercise   Clinical Impression   Criteria for Skilled Therapeutic Intervention yes, treatment indicated   PT Diagnosis impaired functional mobility s/p 3rd  toe  amputation    Influenced by the following impairments pain in R knee, decreased strength, SOB, low  SaO2, peripheral  neuropathy     Functional limitations  "due to impairments bed mobility, transfers, gait, falls risk, taking care  of wife    Clinical Presentation Unstable/Unpredictable   Clinical Presentation Rationale pt has multiple body systems  involved, requiring supplemental O2, neuropathy, R knee pain     Clinical Decision Making (Complexity) Moderate complexity   Therapy Frequency` daily   Predicted Duration of Therapy Intervention (days/wks) 2-3 days     Anticipated Equipment Needs at Discharge (owns  4WW, cane, and crutches)   Anticipated Discharge Disposition Transitional Care Facility   Risk & Benefits of therapy have been explained Yes   Patient, Family & other staff in agreement with plan of care Yes   [facility] TM \"6 Clicks\"   2016, Trustees of [facility], under license to [employer].  All rights reserved.   6 Clicks Short Forms Basic Mobility Inpatient Short Form   [facility]  \"6 Clicks\" V.2 Basic Mobility Inpatient Short Form   1. Turning from your back to your side while in a flat bed without using bedrails? 2 - A Lot   2. Moving from lying on your back to sitting on the side of a flat bed without using bedrails? 2 - A Lot   3. Moving to and from a bed to a chair (including a wheelchair)? 1 - Total   4. Standing up from a chair using your arms (e.g., wheelchair, or bedside chair)? 2 - A Lot   5. To walk in hospital room? 2 - A Lot   6. Climbing 3-5 steps with a railing? 1 - Total   Basic Mobility Raw Score (Score out of 24.Lower scores equate to lower levels of function) 10   Total Evaluation Time   Total Evaluation Time (Minutes) 20      "

## 2018-10-17 NOTE — PLAN OF CARE
Problem: Patient Care Overview  Goal: Plan of Care/Patient Progress Review  Outcome: No Change  Pt. sits at edge of bed, although boot arrived tonight for PT assessment in AM. Denies any pain. Dressing to foot is clean, dry, and intact. Low grade fever tonight that remained stable despite tylenol, cold compress, and I.S. Patient did express feeling better later in shift however with no chills and an improved oxygen saturation. On 6L with simple face mask. Voiding appropriately.

## 2018-10-18 ENCOUNTER — APPOINTMENT (OUTPATIENT)
Dept: CARDIOLOGY | Facility: CLINIC | Age: 73
DRG: 853 | End: 2018-10-18
Attending: INTERNAL MEDICINE
Payer: COMMERCIAL

## 2018-10-18 LAB
ANION GAP SERPL CALCULATED.3IONS-SCNC: 8 MMOL/L (ref 3–14)
BACTERIA SPEC CULT: ABNORMAL
BASOPHILS # BLD AUTO: 0.1 10E9/L (ref 0–0.2)
BASOPHILS NFR BLD AUTO: 0.5 %
BUN SERPL-MCNC: 15 MG/DL (ref 7–30)
CALCIUM SERPL-MCNC: 8.3 MG/DL (ref 8.5–10.1)
CHLORIDE SERPL-SCNC: 101 MMOL/L (ref 94–109)
CO2 SERPL-SCNC: 28 MMOL/L (ref 20–32)
CREAT SERPL-MCNC: 1.09 MG/DL (ref 0.66–1.25)
DIFFERENTIAL METHOD BLD: ABNORMAL
EOSINOPHIL # BLD AUTO: 0.2 10E9/L (ref 0–0.7)
EOSINOPHIL NFR BLD AUTO: 1.3 %
ERYTHROCYTE [DISTWIDTH] IN BLOOD BY AUTOMATED COUNT: 15.8 % (ref 10–15)
GFR SERPL CREATININE-BSD FRML MDRD: 66 ML/MIN/1.7M2
GLUCOSE BLDC GLUCOMTR-MCNC: 117 MG/DL (ref 70–99)
GLUCOSE BLDC GLUCOMTR-MCNC: 141 MG/DL (ref 70–99)
GLUCOSE BLDC GLUCOMTR-MCNC: 148 MG/DL (ref 70–99)
GLUCOSE BLDC GLUCOMTR-MCNC: 152 MG/DL (ref 70–99)
GLUCOSE BLDC GLUCOMTR-MCNC: 164 MG/DL (ref 70–99)
GLUCOSE SERPL-MCNC: 141 MG/DL (ref 70–99)
HCT VFR BLD AUTO: 33.8 % (ref 40–53)
HGB BLD-MCNC: 10.4 G/DL (ref 13.3–17.7)
IMM GRANULOCYTES # BLD: 0.2 10E9/L (ref 0–0.4)
IMM GRANULOCYTES NFR BLD: 1.5 %
INTERPRETATION ECG - MUSE: NORMAL
LYMPHOCYTES # BLD AUTO: 1.4 10E9/L (ref 0.8–5.3)
LYMPHOCYTES NFR BLD AUTO: 12.2 %
Lab: ABNORMAL
MCH RBC QN AUTO: 28.8 PG (ref 26.5–33)
MCHC RBC AUTO-ENTMCNC: 30.8 G/DL (ref 31.5–36.5)
MCV RBC AUTO: 94 FL (ref 78–100)
MONOCYTES # BLD AUTO: 0.9 10E9/L (ref 0–1.3)
MONOCYTES NFR BLD AUTO: 8 %
NEUTROPHILS # BLD AUTO: 8.7 10E9/L (ref 1.6–8.3)
NEUTROPHILS NFR BLD AUTO: 76.5 %
NRBC # BLD AUTO: 0 10*3/UL
NRBC BLD AUTO-RTO: 0 /100
PLATELET # BLD AUTO: 319 10E9/L (ref 150–450)
POTASSIUM SERPL-SCNC: 3.8 MMOL/L (ref 3.4–5.3)
RBC # BLD AUTO: 3.61 10E12/L (ref 4.4–5.9)
SODIUM SERPL-SCNC: 137 MMOL/L (ref 133–144)
SPECIMEN SOURCE: ABNORMAL
TROPONIN I SERPL-MCNC: 0.02 UG/L (ref 0–0.04)
WBC # BLD AUTO: 11.4 10E9/L (ref 4–11)

## 2018-10-18 PROCEDURE — 40000275 ZZH STATISTIC RCP TIME EA 10 MIN

## 2018-10-18 PROCEDURE — 93306 TTE W/DOPPLER COMPLETE: CPT

## 2018-10-18 PROCEDURE — 25500064 ZZH RX 255 OP 636: Performed by: INTERNAL MEDICINE

## 2018-10-18 PROCEDURE — 85025 COMPLETE CBC W/AUTO DIFF WBC: CPT | Performed by: INTERNAL MEDICINE

## 2018-10-18 PROCEDURE — 84484 ASSAY OF TROPONIN QUANT: CPT | Performed by: INTERNAL MEDICINE

## 2018-10-18 PROCEDURE — 25000128 H RX IP 250 OP 636: Performed by: INTERNAL MEDICINE

## 2018-10-18 PROCEDURE — 25000132 ZZH RX MED GY IP 250 OP 250 PS 637: Performed by: INTERNAL MEDICINE

## 2018-10-18 PROCEDURE — 40000983 ZZH STATISTIC HFNC ADULT NON-CPAP

## 2018-10-18 PROCEDURE — 36415 COLL VENOUS BLD VENIPUNCTURE: CPT | Performed by: INTERNAL MEDICINE

## 2018-10-18 PROCEDURE — 99233 SBSQ HOSP IP/OBS HIGH 50: CPT | Performed by: INTERNAL MEDICINE

## 2018-10-18 PROCEDURE — 80048 BASIC METABOLIC PNL TOTAL CA: CPT | Performed by: INTERNAL MEDICINE

## 2018-10-18 PROCEDURE — 93306 TTE W/DOPPLER COMPLETE: CPT | Mod: 26 | Performed by: INTERNAL MEDICINE

## 2018-10-18 PROCEDURE — 12000007 ZZH R&B INTERMEDIATE

## 2018-10-18 PROCEDURE — 00000146 ZZHCL STATISTIC GLUCOSE BY METER IP

## 2018-10-18 RX ORDER — CARVEDILOL 3.12 MG/1
3.12 TABLET ORAL 2 TIMES DAILY WITH MEALS
Status: DISCONTINUED | OUTPATIENT
Start: 2018-10-19 | End: 2018-10-22 | Stop reason: HOSPADM

## 2018-10-18 RX ORDER — CEFAZOLIN SODIUM 2 G/100ML
2 INJECTION, SOLUTION INTRAVENOUS EVERY 8 HOURS
Status: DISCONTINUED | OUTPATIENT
Start: 2018-10-18 | End: 2018-10-21

## 2018-10-18 RX ORDER — FUROSEMIDE 10 MG/ML
40 INJECTION INTRAMUSCULAR; INTRAVENOUS DAILY
Status: DISCONTINUED | OUTPATIENT
Start: 2018-10-19 | End: 2018-10-18

## 2018-10-18 RX ORDER — FUROSEMIDE 40 MG
40 TABLET ORAL DAILY
Status: DISCONTINUED | OUTPATIENT
Start: 2018-10-19 | End: 2018-10-20 | Stop reason: CLARIF

## 2018-10-18 RX ADMIN — DICLOFENAC 2 G: 10 GEL TOPICAL at 17:07

## 2018-10-18 RX ADMIN — LISINOPRIL 20 MG: 20 TABLET ORAL at 08:48

## 2018-10-18 RX ADMIN — GABAPENTIN 200 MG: 100 CAPSULE ORAL at 21:19

## 2018-10-18 RX ADMIN — ENOXAPARIN SODIUM 40 MG: 40 INJECTION SUBCUTANEOUS at 21:19

## 2018-10-18 RX ADMIN — GABAPENTIN 100 MG: 100 CAPSULE ORAL at 08:48

## 2018-10-18 RX ADMIN — FUROSEMIDE 40 MG: 10 INJECTION, SOLUTION INTRAVENOUS at 08:57

## 2018-10-18 RX ADMIN — CEFAZOLIN SODIUM 2 G: 2 INJECTION, SOLUTION INTRAVENOUS at 18:01

## 2018-10-18 RX ADMIN — HUMAN ALBUMIN MICROSPHERES AND PERFLUTREN 2 ML: 10; .22 INJECTION, SOLUTION INTRAVENOUS at 09:54

## 2018-10-18 RX ADMIN — VANCOMYCIN HYDROCHLORIDE 2000 MG: 10 INJECTION, POWDER, LYOPHILIZED, FOR SOLUTION INTRAVENOUS at 08:48

## 2018-10-18 RX ADMIN — DICLOFENAC 2 G: 10 GEL TOPICAL at 09:16

## 2018-10-18 RX ADMIN — TRAMADOL HYDROCHLORIDE 50 MG: 50 TABLET, COATED ORAL at 00:13

## 2018-10-18 RX ADMIN — DICLOFENAC 2 G: 10 GEL TOPICAL at 13:01

## 2018-10-18 RX ADMIN — CEFEPIME HYDROCHLORIDE 1 G: 1 INJECTION, POWDER, FOR SOLUTION INTRAMUSCULAR; INTRAVENOUS at 06:13

## 2018-10-18 RX ADMIN — TAMSULOSIN HYDROCHLORIDE 0.4 MG: 0.4 CAPSULE ORAL at 09:02

## 2018-10-18 RX ADMIN — GABAPENTIN 100 MG: 100 CAPSULE ORAL at 13:01

## 2018-10-18 RX ADMIN — FINASTERIDE 5 MG: 5 TABLET, FILM COATED ORAL at 08:48

## 2018-10-18 RX ADMIN — INSULIN ASPART 1 UNITS: 100 INJECTION, SOLUTION INTRAVENOUS; SUBCUTANEOUS at 17:07

## 2018-10-18 RX ADMIN — Medication 2.5 MG: at 07:00

## 2018-10-18 RX ADMIN — DICLOFENAC 2 G: 10 GEL TOPICAL at 21:20

## 2018-10-18 RX ADMIN — INSULIN ASPART 1 UNITS: 100 INJECTION, SOLUTION INTRAVENOUS; SUBCUTANEOUS at 13:01

## 2018-10-18 RX ADMIN — ASPIRIN 325 MG ORAL TABLET 325 MG: 325 PILL ORAL at 08:48

## 2018-10-18 RX ADMIN — LORATADINE 10 MG: 10 TABLET ORAL at 21:19

## 2018-10-18 ASSESSMENT — ACTIVITIES OF DAILY LIVING (ADL)
ADLS_ACUITY_SCORE: 9
ADLS_ACUITY_SCORE: 13
ADLS_ACUITY_SCORE: 9

## 2018-10-18 NOTE — PROGRESS NOTES
[facility]  Hospitalist Progress Note  Lars Srinivasan MD 10/18/2018    Reason for Stay (Diagnosis): diabetic foot infection         Assessment and Plan:      Summary of Stay: Lance Ibrahim is a 73 year old diabetic male returned to the ED on 10/14/2018 with recurrent left lower extremity infection. Last hospitalization was in June, 2018 at which time he was treated with Clilndamycin and debridement by Podiatry. In the interim, he appears to have followed up with PANFILO James in August and was thought to have early changes compatible with Charcot Arthropathy.    The patient indicates that he has had shakes and chills related since about 3 days PTA when he first noted any discomfort in his left third toe.     In the ED, the patient was noted to have early sepsis with Lactic acid of 2.0, WBC 18K, tachycardia, and was therefore resuscitated with 30 ml/kg (3000 ml) crystalloid and was started on Vanco and Clindamycin.     Mr. Ibrahim was taken to the operating room on 10/16/2018 and underwent left third toe amputation.  Overnight, he developed significant shortness of breath with marked hypoxia probably due to some volume excess that he received during resuscitationand a component of untreated BRIANNA.  At this point however it appears likely that the patient is much more limited physically than he had initially indicated.  He apparently has been unable to ambulate due to severe pain in his knees and spends most of his time sitting, and therefore, it is difficult to know if he had exertional angina-like symptoms.    Problem List:   1. POD #2 s/p disarticulation of the third toe, left foot at MTP.   2. Early sepsis, probably moderate, with tachycardia, elevated WBC, borderline lactic acid, DEAN. Very generously fluid resuscitated in the ED.   3. Acute hypoxic respiratory failure.  Probably acute on chronic heart failure accounts for the patient's hypoxia in the past couple of days, following fluid  resuscitation for sepsis.  Patient has improved with diuresis.    I note that he is on lisinopril at baseline.  With this new diagnosis though, he needs to start both the daily Lasix and beta-blocker.  4. DEAN resolved with fluids.  5. DM II, non-insulin dependent. Managed at baseline with metformin, 1000 BID  6. HTN. On Lisinopril alone.  7. Acute hypoxic resp failure thought due to volume overload, obesity-related respiratory problems (BRIANNA/OHS) exacerbated by narcotic pain control, possible systolic failure/probable diastolic failure.  Suspect, based on the chest x-ray that the patient has a dilated cardiomyopathy. Echo today pending.    PLAN:  1.  Antibiotics narrowed to Ancef alone.  2.  Discontinue IV Lasix at this time.  We will start with Lasix 40 mg p.o. in the morning tomorrow.  3.  Add Coreg 3.125 mg twice daily to start with.  4.  Anticipate the patient will need to follow-up with cardiology.  He may be a candidate for stress testing and or angiography.    DVT Prophylaxis: Pneumatic Compression Devices  Code Status: Full Code  Discharge Dispo: To be determined.  The patient is very reluctant to go to transitional care although this seems a rather straightforward need on his part.  He has intense pain in his right knee which is chronic in addition to left foot pain from the amputation.  Estimated Disch Date / # of Days until Disch: At this point, with patient's complex picture I expect more than a couple of days.          Interval History (Subjective):      I spoke with social work today in order to ask them about availability of home health assistance.  Apparently the patient is extremely limited and has been told by physical therapy that he needs to look for transitional care option.  He is the primary caregiver for his wife who is extremely limited and currently bedbound.  The patient feels he has no other options for her care.    The patient and I talked in great detail about his usual activity,  limitations due to arthritis, evidence of volume overload etc.  Echocardiogram was completed but I did not have a chance to review that with the patient today.                  Physical Exam:      Last Vital Signs:  /80  Pulse 88  Temp 98.5  F (36.9  C)  Resp 20  Ht 1.829 m (6')  Wt 116.6 kg (257 lb)  SpO2 92%  BMI 34.86 kg/m2    I/O last 3 completed shifts:  In: 550 [P.O.:550]  Out: 2450 [Urine:2450]    Constitutional: Awake, alert, cooperative, no apparent distress.  I sat with the patient while he was in bed today for about 30 minutes.  I had taken him off of oxygen and he remained with oxygen saturation at about 80%.  He had no evident dyspnea at that time.   Respiratory: Clear to auscultation bilaterally, no crackles or wheezing   Cardiovascular: Regular rate and rhythm, normal S1 and S2, and no murmur noted   Abdomen: Normal bowel sounds, soft, non-distended, non-tender   Skin: No rashes, no cyanosis, dry to touch   Neuro: Alert and oriented x3.  Patient is generally weak.   Extremities:  Left lower extremity in a walking boot.   Other(s):        All other systems: Negative          Medications:      All current medications were reviewed with changes reflected in problem list.         Data:      All new lab and imaging data was reviewed.   Labs/Imaging:  Results for orders placed or performed during the hospital encounter of 10/14/18 (from the past 24 hour(s))   CT Chest Pulmonary Embolism w Contrast    Narrative    CT CHEST WITH CONTRAST  10/17/2018 11:14 PM     HISTORY: Hypoxia. Tachycardia.    COMPARISON: 10/22/2016 - CT abdomen and pelvis    TECHNIQUE: Following the uneventful administration of 80 mL Isovue-370  intravenous contrast, helical sections were acquired through the lungs  according to the pulmonary embolism protocol. Coronal reconstructions  were generated. Radiation dose for this scan was reduced using  automated exposure control, adjustment of the mA and/or kV according  to the  patient's size, or iterative reconstruction technique.     FINDINGS: The central pulmonary arteries are mildly large in caliber.  No visualized pulmonary embolus. The thoracic aorta is normal in  caliber without dissection. Atherosclerotic calcification in the  thoracic aorta and coronary arteries.    Mild to moderate interstitial thickening and ground-glass opacities  scattered throughout both lungs with an upper lobe predominance. Very  small bilateral pleural effusions with adjacent atelectasis. No  pericardial effusion. A few nonspecific borderline and mildly enlarged  mediastinal and bilateral hilar lymph nodes. Two surgical screws are  present in the left humeral head.    Scan through the upper abdomen is significant for mild nodularity of  both adrenal glands that most likely relates to adenomas and a 3 cm  cyst in the upper pole of the left kidney.      Impression    IMPRESSION:  1. Mild to moderate interstitial thickening and ground-glass opacities  scattered within both lungs. These most likely relate to pulmonary  edema, but could also be infectious or inflammatory in etiology.  2. Very small bilateral pleural effusions.  3. No visualized pulmonary embolus.    NATALIE MADRIGAL MD   Glucose by meter   Result Value Ref Range    Glucose 141 (H) 70 - 99 mg/dL   Glucose by meter   Result Value Ref Range    Glucose 117 (H) 70 - 99 mg/dL   CBC with platelets differential   Result Value Ref Range    WBC 11.4 (H) 4.0 - 11.0 10e9/L    RBC Count 3.61 (L) 4.4 - 5.9 10e12/L    Hemoglobin 10.4 (L) 13.3 - 17.7 g/dL    Hematocrit 33.8 (L) 40.0 - 53.0 %    MCV 94 78 - 100 fl    MCH 28.8 26.5 - 33.0 pg    MCHC 30.8 (L) 31.5 - 36.5 g/dL    RDW 15.8 (H) 10.0 - 15.0 %    Platelet Count 319 150 - 450 10e9/L    Diff Method Automated Method     % Neutrophils 76.5 %    % Lymphocytes 12.2 %    % Monocytes 8.0 %    % Eosinophils 1.3 %    % Basophils 0.5 %    % Immature Granulocytes 1.5 %    Nucleated RBCs 0 0 /100    Absolute  Neutrophil 8.7 (H) 1.6 - 8.3 10e9/L    Absolute Lymphocytes 1.4 0.8 - 5.3 10e9/L    Absolute Monocytes 0.9 0.0 - 1.3 10e9/L    Absolute Eosinophils 0.2 0.0 - 0.7 10e9/L    Absolute Basophils 0.1 0.0 - 0.2 10e9/L    Abs Immature Granulocytes 0.2 0 - 0.4 10e9/L    Absolute Nucleated RBC 0.0    Basic metabolic panel   Result Value Ref Range    Sodium 137 133 - 144 mmol/L    Potassium 3.8 3.4 - 5.3 mmol/L    Chloride 101 94 - 109 mmol/L    Carbon Dioxide 28 20 - 32 mmol/L    Anion Gap 8 3 - 14 mmol/L    Glucose 141 (H) 70 - 99 mg/dL    Urea Nitrogen 15 7 - 30 mg/dL    Creatinine 1.09 0.66 - 1.25 mg/dL    GFR Estimate 66 >60 mL/min/1.7m2    GFR Estimate If Black 80 >60 mL/min/1.7m2    Calcium 8.3 (L) 8.5 - 10.1 mg/dL   Troponin I   Result Value Ref Range    Troponin I ES 0.017 0.000 - 0.045 ug/L   ECHO COMPLETE WITH OPTISON    Narrative    [id number]  [id number]  [id number]  [id number]^JOANA^RBANDEN^R           [facility]  Echocardiography Laboratory  [address] [place] [address]        Name: CURTIS SAHU  MRN: [id number]  : 1945  Study Date: 10/18/2018 09:46 AM  Age: 73 yrs  Gender: Male  Patient Location: [facility]  Reason For Study: Dyspnea  Ordering Physician: BRANDEN BERNARD  Referring Physician: Anh Spivey  Performed By: Jazmine Lai RDCS     BSA: 2.4 m2  Height: 72 in  Weight: 257 lb  HR: 95  BP: 133/74 mmHg  _____________________________________________________________________________  __        Procedure  Complete Portable Echo Adult. Contrast Optison.  _____________________________________________________________________________  __        Interpretation Summary     The left ventricle is normal in size.  The visual ejection fraction is estimated at 45-50%.  There is mild global hypokinesia of the left ventricle.  Septal motion is consistent with conduction abnormality.  Grade I or early diastolic dysfunction.  The right ventricle is mildly dilated.  The right atrium is mildly  dilated.  Compared to the last echo done 9 years ago there has been a decrease in LV  function. The study was technically difficult. Contrast was used without  apparent complications.  _____________________________________________________________________________  __        Left Ventricle  The left ventricle is normal in size. There is normal left ventricular wall  thickness. The visual ejection fraction is estimated at 45-50%. Grade I or  early diastolic dysfunction. Diastolic Doppler findings (E/E' ratio and/or  other parameters) suggest left ventricular filling pressures are  indeterminate. There is mild global hypokinesia of the left ventricle. Septal  motion is consistent with conduction abnormality.     Right Ventricle  The right ventricle is mildly dilated. The right ventricular systolic function  is normal.     Atria  Normal left atrial size. The right atrium is mildly dilated. There is no  atrial shunt seen.     Mitral Valve  There is trace mitral regurgitation.        Tricuspid Valve  Dilated IVC (>2.5cm) with <50% respiratory collapse; right atrial pressure is  estimated at 15-20mmHg. There is trace tricuspid regurgitation.     Aortic Valve  There is mild trileaflet aortic sclerosis. No aortic regurgitation is present.  No aortic stenosis is present.     Pulmonic Valve  There is trace pulmonic valvular regurgitation.     Vessels  Normal size aorta.     Pericardium  There is no pericardial effusion.        Rhythm  Sinus rhythm was noted.  _____________________________________________________________________________  __  MMode/2D Measurements & Calculations  IVSd: 0.98 cm     LVIDd: 5.2 cm  LVIDs: 3.6 cm  LVPWd: 1.2 cm  FS: 31.1 %  LV mass(C)d: 214.0 grams  LV mass(C)dI: 90.3 grams/m2  Ao root diam: 3.6 cm  LA dimension: 3.7 cm  LA/Ao: 1.0  LA Volume (BP): 51.0 ml  LA Volume Indexed (AL/bp): 21.2 ml/m2  RWT: 0.46  TAPSE: 2.1 cm           Doppler Measurements & Calculations  MV E max anthony: 97.7 cm/sec  MV A  "max anthony: 92.8 cm/sec  MV E/A: 1.1  MV dec time: 0.20 sec  TR max anthony: 238.9 cm/sec  TR max P.8 mmHg  E/E' av.2  Lateral E/e': 10.7  Medial E/e': 13.7           _____________________________________________________________________________  __           Report approved by: Lisbet Briones 10/18/2018 02:09 PM      Glucose by meter   Result Value Ref Range    Glucose 164 (H) 70 - 99 mg/dL   Social Work IP Consult    Narrative    Marian Gonzalez BSW     10/18/2018  3:14 PM  Care Transition Initial Assessment - SW  Reason For Consult: discharge planning  Met with: Patient  Active Problems:    Diabetic foot infection (H)       DATA  Lives With: spouse. Pt's spouse Monique has Sjogren's Neuropathy per   pt's report. It has been a progression over 9 years, and she has   been bed ridden for 3 years. Pt works full-time, sometimes from   home.  Living Arrangements: House. Bedrooms are upstairs from main   living area.  Support Assessment: Adequate social supports.   Identified issues/concerns regarding health management: Per RN   care coordinator, pt lives at home with his wife with no   additional services and supports. When SW met with patient, Pt   reported that he is the primary caregiver for his wife Monique, who   has Sjogren's Neuropathy, and has been bed bound for 3 years. Pt   would leave food (bagels, fruit, salads, sandwiches, etc.) by his   wife's bed when he would go to work for the day. Pt's wife uses   depends and bed pads for toileting, with a garbage next to her   bed to dispose of them. Per pt's report, Monique uses wipes to \"clean   what she can\" to bathe. She uses ointment as needed for bed   sores. Pt believes that he will be able to return home at   discharge and continue being the caregiver for his wife.   Currently, patient is Ax2 and PT is recommending a TCU. Pt's   spouse's ex- and pt's son are currently stopping by their   home to check on patient's wife Monique and provide meals for her. "   Referrals have been sent for a TCU for patient, although patient   continues to be optimistic that he can return home. SW discussed   the need for planning for future needs for him and his wife.   Provided home care agency list for private pay home health aides.   Pt reported that he would check into this and discuss with his   wife before making any decisions. Patient refused any additional   resources.       ASSESSMENT  Cognitive Status:  awake, alert and oriented. Patient appears   irritable during conversation with .   Concerns to be addressed: Pt will likely need TCU at discharge.   His wife, who pt is the primary caregiver for, is bed ridden at   home without adequate supports. Monique's (pt's spouse) son Omari and   ex- Will are checking on Monique occasionally while pt is   hospitalized.     PLAN  Financial costs for the patient includes Private pay home support   for pt's wife. Home support vs. TCU for pt.  Patient given options and choices for discharge Yes .  Patient/family is agreeable to the plan?  Pt is reluctant to go   to a TCU. SW will continue to follow for discharge planning after   PT continues to work with patient and make recommendations.  Patient Goals and Preferences: Pt would like to return home at   discharge, continue to be a caregiver for his wife, and return to   work.  Patient anticipates discharging to:  Probable TCU. Pt has been   accepted for a bed at [facility].         Glucose by meter   Result Value Ref Range    Glucose 152 (H) 70 - 99 mg/dL

## 2018-10-18 NOTE — PROGRESS NOTES
"Care Transition Initial Assessment - RN    Reason For Consult: care coordination/care conference, discharge planning (Home Infusion potential need in elevated HARRY pt . )   Met with: Patient.  DATA   Active Problems:    Diabetic foot infection (H)       Cognitive Status: awake, alert and oriented.  [facility]   Primary Care MD Name: Allyssa   Contact information and PCP information verified: Yes  Lives With: spouse (pt reports she is permanently disabled )  Living Arrangements: house     Description of Support System: Supportive       Support Assessment:  (TBD)   Insurance concerns: No Insurance issues identified  ASSESSMENT  Patient currently receives the following services:  NONE        Identified issues/concerns regarding health management:CTS following elevated Harry score pt S/p toe amp who previously was independent and full time care giver of his wife who pt reports as \"bedbound\"  Pt explains that all bedrooms are on 2nd floor and office and kitchen are on first floor with \" a few steps\" to enter from garage.   Pt explains that \"I am in need of a Total knee replacement at some point,  but Dr. Lance has been giving me Cortizone shots every 90 days and this has helped my right knee\"  Pt just had one 3 weeks ago.   Clearly pt's current fxnal status is well below his prior.   PT is recommending TCU. Pt appears to be somewhat unrealistic  In his thought process as he explains his goal is to be to return home to care for his wife who is bed bound on the 2nd floor, listing barriers a flight of stairs that he will navigate on his butt but is not able to explain his plan once he reaches the top of stairs as he explains he will use his step sons knee scooter on the main level to navigate.   CTS explained that his described plan seems somewhat unrealistic and unsafe if he is primary care provider for his wife. Explains that currently pt is being cared for by her ex  and her son.   Pt " "explains understanding and validates concerns yet requests \" can I get some information on community services for \"the meal prep and HHA services to care for my wife.\"   Explained that SWS would be following up to have that discussion and provide resources,  but warned to be aware that this is a process that does not happen overnight and that he should start working on continued care for his wife as it may be unrealistic to think he can return home at this time.     Pt agree'd to have TCU referrals placed to [facility]. [facility] and 2. [facility] , noting [facility] insurance.   Referral placed   SWS to consult regarding support, resources etc for pt wife.     Estee Bunch RN, BSN, Care Transitions Specialist   Care Transitions Team  [phone]                      "

## 2018-10-18 NOTE — PROGRESS NOTES
[facility]  Hospitalist Progress Note  Lars Srinivasan MD 10/17/2018    Reason for Stay (Diagnosis): diabetic foot infection         Assessment and Plan:      Summary of Stay: Lance Ibrahim is a 73 year old diabetic male returned to the ED on 10/14/2018 with recurrent left lower extremity infection. Last hospitalization was in June, 2018 at which time he was treated with Clilndamycin and debridement by Podiatry. In the interim, he appears to have followed up with PANFILO James in August and was thought to have early changes compatible with Charcot Arthropathy.    The patient indicates that he has had shakes and chills related since about 3 days PTA when he first noted any discomfort in his left third toe.     Mr. Ibrahim was taken to the operating room on 10/16/2018 and underwent left third toe amputation.  He subsequently developed significant shortness of breath probably due to some volume excess that he received during resuscitation.  At this point however it appears likely that the patient is much more limited physically than he had initially indicated.  He apparently has been unable to ambulate due to severe pain in his knees and spends most of his time sitting.    Problem List:   1. Left third toe recurrent infection, now with clear evidence of osteomyelitis on MRI and surrounding cellulitis involving the distal foot and ankle. POD #1 s/p disarticulation of the third toe, left foot.   2. DEAN resolved with fluids, now with volume overload.   3. DM II, non-insulin dependent. Managed with metformin, 1000 BID  4. HTN. On Lisinopril alone.  5. Hypoxia presumably due to volume overload.  Suspect, based on the chest x-ray that the patient has a dilated cardiomyopathy.    PLAN:  1.  Cont empiric Vanco and change Clinda to Cefepime, based on ID recs.  2.  Continue with twice daily Lasix for now.  On suspicion of dilated cardiomyopathy, I will obtain an echocardiogram for tomorrow.  3.  Given the late  report of mildly elevated fever and increased oxygen needs this evening, blood cultures were obtained along with lactic acid and CBC.  I note that my partner, Dr. Rausch, obtained d-dimer as well.  We will await the results of CT scanning but noted elevated d-dimer may be due to other inflammation she is not related to PE).  Appropriate to investigate further.  Will empirically start Lovenox.    DVT Prophylaxis: Pneumatic Compression Devices  Code Status: Full Code  Discharge Dispo: To be determined.  The patient is very reluctant to go to transitional care although this seems a rather straightforward need on his part.  He has intense pain in his right knee which is chronic in addition to left foot pain from the amputation.  Estimated Disch Date / # of Days until Disch: At this point, with patient's complex picture I expect more than a couple of days.          Interval History (Subjective):      The patient is very reluctant to go to transitional care though it seems likely that this will need to happen.  I spoke directly with physical therapy today and they found the patient to be extremely limited with regard to independence.                  Physical Exam:      Last Vital Signs:  /71 (BP Location: Left arm)  Pulse 98  Temp 97.8  F (36.6  C) (Oral)  Resp 18  Ht 1.829 m (6')  Wt 116.6 kg (257 lb)  SpO2 91%  BMI 34.86 kg/m2    I/O last 3 completed shifts:  In: 2065 [P.O.:760; I.V.:1305]  Out: 775 [Urine:775]    Constitutional: Awake, alert, cooperative, no apparent distress. Wearing facemask wth oxygen running at 4 LPM and O2 sat > 92%   Respiratory: Clear to auscultation bilaterally, no crackles or wheezing   Cardiovascular: Regular rate and rhythm, normal S1 and S2, and no murmur noted   Abdomen: Normal bowel sounds, soft, non-distended, non-tender   Skin: No rashes, no cyanosis, dry to touch   Neuro: Alert and oriented x3.  Patient is generally weak.   Extremities:  Left lower extremity in a walking  boot.   Other(s):        All other systems: Negative          Medications:      All current medications were reviewed with changes reflected in problem list.         Data:      All new lab and imaging data was reviewed.   Labs/Imaging:  Results for orders placed or performed during the hospital encounter of 10/14/18 (from the past 24 hour(s))   Glucose by meter   Result Value Ref Range    Glucose 142 (H) 70 - 99 mg/dL   Lactic acid whole blood   Result Value Ref Range    Lactic Acid 0.6 (L) 0.7 - 2.0 mmol/L   Glucose by meter   Result Value Ref Range    Glucose 145 (H) 70 - 99 mg/dL   Glucose by meter   Result Value Ref Range    Glucose 127 (H) 70 - 99 mg/dL   Creatinine   Result Value Ref Range    Creatinine 1.15 0.66 - 1.25 mg/dL    GFR Estimate 62 >60 mL/min/1.7m2    GFR Estimate If Black 75 >60 mL/min/1.7m2   Hemoglobin A1c   Result Value Ref Range    Hemoglobin A1C 6.4 (H) 0 - 5.6 %   Glucose by meter   Result Value Ref Range    Glucose 188 (H) 70 - 99 mg/dL   Glucose by meter   Result Value Ref Range    Glucose 139 (H) 70 - 99 mg/dL   XR Foot Port Left 3 Views    Narrative    FOOT PORTABLE LEFT THREE VIEWS  10/17/2018 5:57 PM     HISTORY: Postoperative. New base line.       Impression    IMPRESSION: Third toe amputation at the metatarsophalangeal joint, new  since 10/14/2018. Bunionectomy and hallux valgus, unchanged.   Vancomycin level   Result Value Ref Range    Vancomycin Level 23.2 mg/L   Lactic acid level STAT for sepsis protocol   Result Value Ref Range    Lactate for Sepsis Protocol 1.1 0.7 - 2.0 mmol/L   CBC with platelets   Result Value Ref Range    WBC 11.3 (H) 4.0 - 11.0 10e9/L    RBC Count 3.47 (L) 4.4 - 5.9 10e12/L    Hemoglobin 10.0 (L) 13.3 - 17.7 g/dL    Hematocrit 32.4 (L) 40.0 - 53.0 %    MCV 93 78 - 100 fl    MCH 28.8 26.5 - 33.0 pg    MCHC 30.9 (L) 31.5 - 36.5 g/dL    RDW 15.8 (H) 10.0 - 15.0 %    Platelet Count 311 150 - 450 10e9/L   D dimer quantitative   Result Value Ref Range    D Dimer  2.6 (H) 0.0 - 0.50 ug/ml FEU   EKG 12-lead, tracing only   Result Value Ref Range    Interpretation ECG Click View Image link to view waveform and result    Blood gas arterial with oxyhemoglobin   Result Value Ref Range    pH Arterial 7.48 (H) 7.35 - 7.45 pH    pCO2 Arterial 35 35 - 45 mm Hg    pO2 Arterial 44 (L) 80 - 105 mm Hg    Bicarbonate Arterial 26 21 - 28 mmol/L    FIO2 NC     Oxyhemoglobin Arterial 81 (L) 92 - 100 %    Base Excess Art 2.6 mmol/L   XR Chest Port 1 View    Narrative    XR CHEST PORT 1 VW  10/17/2018 7:50 PM     HISTORY:  Hypoxia;     COMPARISON: Film dated 10/16/2018    FINDINGS:  The heart is enlarged. When compared to the previous film,  there has been an improvement in the interstitial edema. No  alveolar-type infiltrates are seen.      Impression    IMPRESSION:  1. Cardiomegaly.  2. Improved interstitial edema.    TUCKER BO MD   Glucose by meter   Result Value Ref Range    Glucose 255 (H) 70 - 99 mg/dL

## 2018-10-18 NOTE — PLAN OF CARE
Problem: Patient Care Overview  Goal: Plan of Care/Patient Progress Review  Outcome: No Change  Pt A&Ox4, VSS with the exception of O2 dipping into the 80's at time.  Hx BRIANNA.  On high flow at 80% & 50LPM.  Reported 6/10 right knee pain, received PO tramadol x1.  Left foot dressing C/D/I.  Denies numbness and tingling.  Lungs dim t/o, not passing flatus, hypoactive BS, BM 10/17/18.  Tolerating Mod Cho diet.  Not OOB this shift.  Was told up 2 assist gb/ww or lift.  To wear a boot on left foot when OOB.  Plan:  Monitor for respiratory distress, ECHO, ID & respiratory following, SW consult, IV ABX, wean from high flow, PT/OT.  Will continue to monitor.

## 2018-10-18 NOTE — CONSULTS
"Care Transition Initial Assessment - TARA  Reason For Consult: discharge planning  Met with: Patient  Active Problems:    Diabetic foot infection (H)       DATA  Lives With: spouse. Pt's spouse Monique has Sjogren's Neuropathy per pt's report. It has been a progression over 9 years, and she has been bed ridden for 3 years. Pt works full-time, sometimes from home.  Living Arrangements: House. Bedrooms are upstairs from main living area.  Support Assessment: Adequate social supports.   Identified issues/concerns regarding health management: Per RN care coordinator, pt lives at home with his wife with no additional services and supports. When SW met with patient, Pt reported that he is the primary caregiver for his wife Monique, who has Sjogren's Neuropathy, and has been bed bound for 3 years. Pt would leave food (bagels, fruit, salads, sandwiches, etc.) by his wife's bed when he would go to work for the day. Pt's wife uses depends and bed pads for toileting, with a garbage next to her bed to dispose of them. Per pt's report, Monique uses wipes to \"clean what she can\" to bathe. She uses ointment as needed for bed sores. Pt believes that he will be able to return home at discharge and continue being the caregiver for his wife. Currently, patient is Ax2 and PT is recommending a TCU. Pt's spouse's ex- and pt's son are currently stopping by their home to check on patient's wife Monique and provide meals for her. Referrals have been sent for a TCU for patient, although patient continues to be optimistic that he can return home. SW discussed the need for planning for future needs for him and his wife. Provided home care agency list for private pay home health aides. Pt reported that he would check into this and discuss with his wife before making any decisions. Patient refused any additional resources.       ASSESSMENT  Cognitive Status:  awake, alert and oriented. Patient appears irritable during conversation with . "   Concerns to be addressed: Pt will likely need TCU at discharge. His wife, who pt is the primary caregiver for, is bed ridden at home without adequate supports. Monique's (pt's spouse) son Omari and ex- Will are checking on Monique occasionally while pt is hospitalized.     PLAN  Financial costs for the patient includes Private pay home support for pt's wife. Home support vs. TCU for pt.  Patient given options and choices for discharge Yes .  Patient/family is agreeable to the plan?  Pt is reluctant to go to a TCU. SW will continue to follow for discharge planning after PT continues to work with patient and make recommendations.  Patient Goals and Preferences: Pt would like to return home at discharge, continue to be a caregiver for his wife, and return to work.  Patient anticipates discharging to:  Probable TCU. [facility]

## 2018-10-18 NOTE — PHARMACY-VANCOMYCIN DOSING SERVICE
Pharmacy Vancomycin Note  Date of Service 2018  Patient's  [id number]   73 year old, male    Indication: Osteomyelitis  Goal Trough Level: 15-20 mg/L  Day of Therapy: 3  Current Vancomycin regimen:  2000 mg IV q12h    Current estimated CrCl = Estimated Creatinine Clearance: 74.3 mL/min (based on Cr of 1.15).    Creatinine for last 3 days  10/15/2018:  6:02 AM Creatinine 0.92 mg/dL  10/16/2018:  6:01 AM Creatinine 1.01 mg/dL  10/17/2018:  7:09 AM Creatinine 1.15 mg/dL    Recent Vancomycin Levels (past 3 days)  10/16/2018:  3:00 PM Vancomycin Level 19.5 mg/L  10/17/2018:  6:55 PM Vancomycin Level 23.2 mg/L    Vancomycin IV Administrations (past 72 hours)                   vancomycin (VANCOCIN) 2,000 mg in sodium chloride 0.9 % 500 mL intermittent infusion (mg) 2,000 mg Given 10/17/18 0826     2,000 mg Given 10/16/18 1540     2,000 mg Given  0630     2,000 mg Given 10/15/18 0405                Nephrotoxins and other renal medications (Future)    Start     Dose/Rate Route Frequency Ordered Stop    10/17/18 1700  furosemide (LASIX) injection 40 mg      40 mg  over 1-2 Minutes Intravenous 2 TIMES DAILY BEFORE MEALS 10/17/18 1655      10/15/18 0900  lisinopril (PRINIVIL/ZESTRIL) tablet 20 mg      20 mg Oral DAILY 10/14/18 1730      10/15/18 0400  vancomycin (VANCOCIN) 2,000 mg in sodium chloride 0.9 % 500 mL intermittent infusion      2,000 mg  over 2 Hours Intravenous EVERY 12 HOURS 10/14/18 1745               Contrast Orders - past 72 hours (72h ago through future)    Start     Dose/Rate Route Frequency Ordered Stop    10/15/18 0845  gadobutrol (GADAVIST) injection 15 mL      15 mL Intravenous ONCE 10/15/18 0832 10/15/18 0919          Interpretation of levels and current regimen:  Trough level is  Therapeutic (23.2 mg/L level was not a true trough as it was drawn ~ 1.5 hours prior to the true trough); Estimate the true trough is between 15-20 mg/L.    Has serum creatinine changed > 50% in last 72 hours:  No    Urine output:  good urine output    Renal Function: Stable    Plan:  1.  Continue Current Dose  2.  Pharmacy will check trough levels as appropriate in 1-3 Days.    3. Serum creatinine levels will be ordered daily for the first week of therapy and at least twice weekly for subsequent weeks. Renal function will be monitored closely.    Irene Kenney        .

## 2018-10-18 NOTE — PROGRESS NOTES
"Care Transitions Team: Following for CC, discharge planning, and disposition.      [facility] TCU called with medical acceptance of pt and insurance authorization pending.   Discussed this with pt,  Again,  explaining the reality of having increased care needs for himself 2/2 to his right knee pain, decreased mobility.   Reiterating PT assessment barriers as,  very limited mobility, O2 needs, stairs within home, unable to ambulate yet, caregiver for spouse.     Pt again is quite hesitant and says \"well I will need to be flexible\"     Explained that pt is not discharge ready yet and to anticipate upon Discharge readiness, a discharge plan, mentioning his wife and the need for his step son and wife ex  to be updated on his current situation as well.  Pt reports he will speak with them tonight and \"figure this out\"     Will follow up tomorrow am.     Estee Bunch RN, BSN, Care Transitions Specialist   Care Transitions Team  [phone]      "

## 2018-10-18 NOTE — PROGRESS NOTES
RT Note:    Patient remains on HFNC 80% and 50LPM. RT Will continue to monitor.    Nalini Davila  October 18, 2018.5:38 AM

## 2018-10-18 NOTE — PLAN OF CARE
Problem: Patient Care Overview  Goal: Plan of Care/Patient Progress Review  PT: Pt continues to be on HFNC 80%, pt sleeping upon check in, sats per monitor mid to high 80's. Will check back in PM

## 2018-10-18 NOTE — SIGNIFICANT EVENT
CTSP earlier this evening for hypoxemia.  He was saturating appropriately at time of my evaluation, but his high-flow had been uptitrated from 60 to 80% O2 at that time.  Recent ABG appears to be venous and does not correlate with oximeter.  Patient likely has severe sleep apnea, he was recommended for palatoplasty but this was not covered by insurance at the time. Does not typically tolerate CPAP.  Poor respiratory mechanics secondary to obesity, only able to pull 1200 on IS and he is 6' tall. PE study ordered after D-dimer was noted to be elevated; this shows edema and effusions but no PE. Probable HFpEF and/or PH, would consider repeat echocardiogram. Continue diuresis tomorrow and high-flow this evening.

## 2018-10-18 NOTE — PROGRESS NOTES
RT Note:    Patient transported to CT with 15LPM oxymask. Maintained SpO2 throughout trip to CT. Placed back on HFNC 80% and 50LPM once back to patient's room. RT will continue to monitor.    Nalini Davila  October 17, 2018.11:49 PM

## 2018-10-18 NOTE — PROVIDER NOTIFICATION
Paged admitting Md that pt still having low SpO2 even on high flow, still febrile and tachy. Also paged resp.    Dr. Rausch called and came up to see pt. EKG, CXR order.     Respiratory here to adjust high flow.

## 2018-10-18 NOTE — PLAN OF CARE
Problem: Patient Care Overview  Goal: Plan of Care/Patient Progress Review  Outcome: Declining  At beginning of shift pt began destating. Put pt on 5-6 L Oxy mask, febrile, tachycardic, flushed, diaphoretic. MD and resp paged. Pt given IV lasix, put on high flow. Pt triggered lactic, labs drawn. Cold cloth's applied, fan in place, tylenol given. Pt continued to be SOB, labored breathing and decreased Spo2. Respiratory and MD paged again. Dr. Rausch came to assess, ordered cxr, EKG, and more labs. Pt continued to be febrile, tachycardic, low o2 saturation. D-dimer came back 2.6. MD paged. Spoke with Dr. Rausch, CT scan ordered. Paged respiratory and flyer to assist in transferring pt to scan. Used slider board to transfer to cart.      Pt voiding in urinal. Per PT pt has bad right knee and low ROM with LUE, making it difficult for pt to ambulate with walker. WBAT LLE when in boot. Pain managed with low dose oxy, gabapentin. CMS intact. Dressing CDI. On vanco and maxipime. Podiatry signed off. Blood cultures pending. Bedtime , 3 units given. lovenox given    Pt on way to CT scan.

## 2018-10-18 NOTE — PROVIDER NOTIFICATION
Text paged MD pts d-dimer was 2.6    Spoke with Dr. Rausch, pt to go get scan. Called respiratory and flyer to assist in escorting pt d/t chances of clot

## 2018-10-18 NOTE — PLAN OF CARE
Problem: Patient Care Overview  Goal: Plan of Care/Patient Progress Review  Pt complains of  right knee pain . voltaren cream applied and pt stated there was good relief.  It is scheduled for the pt.  Pt is now on nasal cannula  4L sats in mid 90's.  Pt is encouraged to use IS ,ambulate in halls and sit up in chair.

## 2018-10-18 NOTE — PROGRESS NOTES
[facility]  Infectious Disease Progress Note          Assessment and Plan:   IMPRESSION:   1.  73-year-old diabetic male with acute left leg and foot cellulitis, definite concern for third toe osteomyelitis with a grossly abnormal toe, some systemic toxicity with blood cultures pending.   2.  ANAPHYLACTIC PENICILLIN ALLERGY.   3.  Diabetes mellitus.   4.  Mild renal insufficiency, improved.   5.  Prior history of foot and leg cellulitis.   6 Hypoxia, some interstitial changes on imaging but doubt infection      RECOMMENDATIONS:   1.  to ancef MSSA only in cx  2. Note operative findings, ., cultures MSSA, of note still 100+, even if foot OK not discharge ready with fever not resolved , sl WBC,   3 Doubt lungs are infection            Interval History:   no new complaints and doing well; no cp, sob, n/v/d, or abd pain. Mild hypoxia, foot cx staph only sens pending, toe amp, fever 100+ still              Medications:       aspirin  325 mg Oral Daily     ceFAZolin  2 g Intravenous Q8H     diclofenac  2 g Transdermal 4x Daily     enoxaparin  40 mg Subcutaneous Q24H     finasteride  5 mg Oral Daily     [START ON 10/19/2018] furosemide  40 mg Intravenous Daily     gabapentin  100 mg Oral BID     gabapentin  200 mg Oral At Bedtime     hypromellose-dextran  1 drop Both Eyes QAM     insulin aspart  1-10 Units Subcutaneous TID AC     insulin aspart  1-7 Units Subcutaneous At Bedtime     lisinopril  20 mg Oral Daily     loratadine  10 mg Oral At Bedtime     sodium chloride (PF)  3 mL Intracatheter Q8H     tamsulosin  0.4 mg Oral Daily                  Physical Exam:   Blood pressure 138/74, pulse 88, temperature 99  F (37.2  C), temperature source Oral, resp. rate 20, height 1.829 m (6'), weight 116.6 kg (257 lb), SpO2 93 %.  Wt Readings from Last 2 Encounters:   10/14/18 116.6 kg (257 lb)   08/27/18 118.3 kg (260 lb 14.4 oz)     Vital Signs with Ranges  Temp:  [96.7  F (35.9  C)-101.2  F (38.4  C)] 99   F (37.2  C)  Pulse:  [88] 88  Heart Rate:  [] 98  Resp:  [18-28] 20  BP: (138-158)/(70-77) 138/74  FiO2 (%):  [60 %-90 %] 70 %  SpO2:  [74 %-98 %] 93 %    Constitutional: Awake, alert, cooperative, no apparent distress   Lungs: Clear to auscultation bilaterally, no crackles or wheezing   Cardiovascular: Regular rate and rhythm, normal S1 and S2, and no murmur noted   Abdomen: Normal bowel sounds, soft, non-distended, non-tender   Skin: No rashes, no cyanosis, some edema foot wrapped   Other:           Data:   All microbiology laboratory data reviewed.  Recent Labs   Lab Test  10/18/18   0739  10/17/18   1855  10/15/18   0602   WBC  11.4*  11.3*  17.1*   HGB  10.4*  10.0*  10.2*   HCT  33.8*  32.4*  32.5*   MCV  94  93  94   PLT  319  311  261     Recent Labs   Lab Test  10/18/18   0739  10/17/18   0709  10/16/18   0601   CR  1.09  1.15  1.01     No lab results found.  Recent Labs   Lab Test  10/17/18   1858  10/17/18   1854  10/15/18   1615  10/14/18   1455  10/14/18   1443  06/19/18   1459  06/19/18   1448  05/01/18   1610  05/01/18   1549   CULT  No growth after 7 hours  No growth after 7 hours  Moderate growth  Staphylococcus aureus  *  Culture negative monitoring continues  No growth after 4 days  No growth after 4 days  No growth  No growth  No growth  No growth  Light growth  Beta hemolytic Streptococcus group B  *

## 2018-10-18 NOTE — PROGRESS NOTES
SW filed vulnerable adult report #[id number] due to pt being the primary caregiver for his wife, who is bed bound at home without adequate cares/supports. Report filed 10/18 @ 1500. See consult note for more details.     TAAR will continue to follow along for discharge planning and any additional needs that arise during pt's hospitalization.

## 2018-10-18 NOTE — PROGRESS NOTES
RT Note:    FiO2 increased to 80% on HFNC due to SpO2 in 80s. ABG obtained from right radial artery without complications. EKG completed. RT will continue to monitor.     Nalini Davila  October 17, 2018.7:58 PM

## 2018-10-19 ENCOUNTER — APPOINTMENT (OUTPATIENT)
Dept: PHYSICAL THERAPY | Facility: CLINIC | Age: 73
DRG: 853 | End: 2018-10-19
Payer: COMMERCIAL

## 2018-10-19 LAB
ANION GAP SERPL CALCULATED.3IONS-SCNC: 8 MMOL/L (ref 3–14)
BUN SERPL-MCNC: 16 MG/DL (ref 7–30)
CALCIUM SERPL-MCNC: 8.8 MG/DL (ref 8.5–10.1)
CHLORIDE SERPL-SCNC: 99 MMOL/L (ref 94–109)
CO2 SERPL-SCNC: 26 MMOL/L (ref 20–32)
CREAT SERPL-MCNC: 1.03 MG/DL (ref 0.66–1.25)
GFR SERPL CREATININE-BSD FRML MDRD: 71 ML/MIN/1.7M2
GLUCOSE BLDC GLUCOMTR-MCNC: 129 MG/DL (ref 70–99)
GLUCOSE BLDC GLUCOMTR-MCNC: 150 MG/DL (ref 70–99)
GLUCOSE BLDC GLUCOMTR-MCNC: 170 MG/DL (ref 70–99)
GLUCOSE BLDC GLUCOMTR-MCNC: 177 MG/DL (ref 70–99)
GLUCOSE BLDC GLUCOMTR-MCNC: 230 MG/DL (ref 70–99)
GLUCOSE SERPL-MCNC: 136 MG/DL (ref 70–99)
POTASSIUM SERPL-SCNC: 4.3 MMOL/L (ref 3.4–5.3)
SODIUM SERPL-SCNC: 133 MMOL/L (ref 133–144)

## 2018-10-19 PROCEDURE — 80048 BASIC METABOLIC PNL TOTAL CA: CPT | Performed by: INTERNAL MEDICINE

## 2018-10-19 PROCEDURE — 40000193 ZZH STATISTIC PT WARD VISIT: Performed by: PHYSICAL THERAPIST

## 2018-10-19 PROCEDURE — 25000132 ZZH RX MED GY IP 250 OP 250 PS 637: Performed by: INTERNAL MEDICINE

## 2018-10-19 PROCEDURE — 36415 COLL VENOUS BLD VENIPUNCTURE: CPT | Performed by: INTERNAL MEDICINE

## 2018-10-19 PROCEDURE — 97530 THERAPEUTIC ACTIVITIES: CPT | Mod: GP | Performed by: PHYSICAL THERAPIST

## 2018-10-19 PROCEDURE — 25000128 H RX IP 250 OP 636: Performed by: INTERNAL MEDICINE

## 2018-10-19 PROCEDURE — 00000146 ZZHCL STATISTIC GLUCOSE BY METER IP

## 2018-10-19 PROCEDURE — 12000007 ZZH R&B INTERMEDIATE

## 2018-10-19 PROCEDURE — 99233 SBSQ HOSP IP/OBS HIGH 50: CPT | Performed by: INTERNAL MEDICINE

## 2018-10-19 RX ADMIN — TAMSULOSIN HYDROCHLORIDE 0.4 MG: 0.4 CAPSULE ORAL at 08:18

## 2018-10-19 RX ADMIN — Medication 2.5 MG: at 21:59

## 2018-10-19 RX ADMIN — LISINOPRIL 20 MG: 20 TABLET ORAL at 08:18

## 2018-10-19 RX ADMIN — TRAMADOL HYDROCHLORIDE 50 MG: 50 TABLET, COATED ORAL at 14:15

## 2018-10-19 RX ADMIN — DICLOFENAC 2 G: 10 GEL TOPICAL at 12:35

## 2018-10-19 RX ADMIN — GABAPENTIN 200 MG: 100 CAPSULE ORAL at 21:25

## 2018-10-19 RX ADMIN — DICLOFENAC 2 G: 10 GEL TOPICAL at 08:24

## 2018-10-19 RX ADMIN — CEFAZOLIN SODIUM 2 G: 2 INJECTION, SOLUTION INTRAVENOUS at 21:24

## 2018-10-19 RX ADMIN — METFORMIN HYDROCHLORIDE 1000 MG: 500 TABLET ORAL at 18:43

## 2018-10-19 RX ADMIN — METFORMIN HYDROCHLORIDE 1000 MG: 500 TABLET ORAL at 08:18

## 2018-10-19 RX ADMIN — INSULIN ASPART 1 UNITS: 100 INJECTION, SOLUTION INTRAVENOUS; SUBCUTANEOUS at 18:49

## 2018-10-19 RX ADMIN — TRAMADOL HYDROCHLORIDE 50 MG: 50 TABLET, COATED ORAL at 00:18

## 2018-10-19 RX ADMIN — TRAMADOL HYDROCHLORIDE 50 MG: 50 TABLET, COATED ORAL at 08:18

## 2018-10-19 RX ADMIN — ACETAMINOPHEN 650 MG: 325 TABLET, FILM COATED ORAL at 16:37

## 2018-10-19 RX ADMIN — CEFAZOLIN SODIUM 2 G: 2 INJECTION, SOLUTION INTRAVENOUS at 04:04

## 2018-10-19 RX ADMIN — LORATADINE 10 MG: 10 TABLET ORAL at 21:25

## 2018-10-19 RX ADMIN — FUROSEMIDE 40 MG: 40 TABLET ORAL at 08:18

## 2018-10-19 RX ADMIN — GABAPENTIN 100 MG: 100 CAPSULE ORAL at 08:18

## 2018-10-19 RX ADMIN — DICLOFENAC 2 G: 10 GEL TOPICAL at 21:25

## 2018-10-19 RX ADMIN — DICLOFENAC 2 G: 10 GEL TOPICAL at 16:29

## 2018-10-19 RX ADMIN — INSULIN ASPART 2 UNITS: 100 INJECTION, SOLUTION INTRAVENOUS; SUBCUTANEOUS at 12:44

## 2018-10-19 RX ADMIN — CARVEDILOL 3.12 MG: 3.12 TABLET, FILM COATED ORAL at 08:18

## 2018-10-19 RX ADMIN — CARVEDILOL 3.12 MG: 3.12 TABLET, FILM COATED ORAL at 18:43

## 2018-10-19 RX ADMIN — ASPIRIN 325 MG ORAL TABLET 325 MG: 325 PILL ORAL at 08:18

## 2018-10-19 RX ADMIN — FINASTERIDE 5 MG: 5 TABLET, FILM COATED ORAL at 08:18

## 2018-10-19 RX ADMIN — CEFAZOLIN SODIUM 2 G: 2 INJECTION, SOLUTION INTRAVENOUS at 12:35

## 2018-10-19 RX ADMIN — ENOXAPARIN SODIUM 40 MG: 40 INJECTION SUBCUTANEOUS at 21:25

## 2018-10-19 RX ADMIN — GABAPENTIN 100 MG: 100 CAPSULE ORAL at 12:35

## 2018-10-19 ASSESSMENT — ACTIVITIES OF DAILY LIVING (ADL)
ADLS_ACUITY_SCORE: 13

## 2018-10-19 ASSESSMENT — PAIN DESCRIPTION - DESCRIPTORS: DESCRIPTORS: ACHING

## 2018-10-19 NOTE — PLAN OF CARE
Problem: Patient Care Overview  Goal: Plan of Care/Patient Progress Review        Discharge Planner PT   Patient plan for discharge: home, as he is primary caregiver for his spouse who is on permanent disability  Current status: Pt supine upon initiation. Agreeable to session. Pt noting R knee pain that is severe in nation. Brief exam completed that shows minimal swelling along the joint line and severe tenderness to palpation around knee joint. To note, pt with tenderness to calf as well, RN updated. Pt completes sit<>supine with Juan Jose and cues for safe technique. Pt requires significant time and use of bed rails. Pt attempts sit<>stand with maxAx2, but unable to rise secondary to pain. Boot in place to L foot for sit<>stand attempts. Pt returns to supine at end of session, requiring maxAx2 to boost toward HOB. Pt on 1.5 L O2 throughout session, O2 sats relatively stable throughout with occasional drop to mid 80s-pt recovers very quickly.   Barriers to return to prior living situation: very limited mobility, O2 needs, stairs within home, unable to ambulate yet, caregiver for spouse  Recommendations for discharge: TCU  Rationale for recommendations: Currently functioning well below baseline, will need ongoing skilled PT intervention to improve independence and safety with functional mobility skills prior to returning home.  Unlikely to reach sufficient mobility goals during hospitalization, but will continue to reassess.        Entered by: Mignon [place] 10/19/2018 3:07 PM

## 2018-10-19 NOTE — PROGRESS NOTES
"Care Transitions Team: Following for CC, discharge planning, and disposition.      Follow up with pt. to discuss continued PT recommendations for TCU.   Pt. explains that \"it is clear I will need to go\"   Pt. has accepted TCU semi private at [facility],  with option to move to private when available for Saturday, potential fee's were explained.   Pt.has requested HE transport to be arranged, will update WKND CM to follow up and coordinate when medical appropriate  for discharge .     [facility] is asking that management of pt foot dressing be addressed in discharge paperwork.     CM to Follow up    Estee Bunch RN, BSN, Care Transitions Specialist   Care Transitions Team  [phone]    "

## 2018-10-19 NOTE — PROGRESS NOTES
Paged respiratory that pt SpO2 85-90% on 5 L NC. up to you if you want to put him on high flow or if that's ok. thanks

## 2018-10-19 NOTE — PROGRESS NOTES
Patient A&Ox4. On 1.5 LPM nasal cannula and SPO2 is  90%. Patient has PT scheduled for 3pm today. Patient is currently Ax2 lift, encouraged weight shift changes and helped with repositioning. Dressing CDI. Patient reports baseline neuropathy in [place] feet. +BS/gas, tolerating diet, BG monitoring. Voiding in adequate amounts using urinal at bedside. Plan is TCU vs  Home for discharge, patient wishes to  Return home and resuming caring for wife, though PT is recommending TCU. Will continue to monitor.

## 2018-10-19 NOTE — PLAN OF CARE
Problem: Patient Care Overview  Goal: Plan of Care/Patient Progress Review  Outcome: Improving  Pt did not get PT today. Please make sure they see him tomorrow. If they come and he is on high flow call RT to take him off even if he is sleeping. They never returned in afternoon, so pt still has not gotten out of bed. Able to turn and reposition with lift and draw sheet. 5 L NC on Spo2 85-95%. Unlabored breathing. Low grade fever. voltaren gel for Right knee helping, also applying ice. LLE elevated-drsg CDI. Only 50% ROM in LUE d/t previous shoulder surgery. BS hypoactive. Voiding in urinal. IV ancef. LS diminished. IS encouraged. Call RT on NOC shift if pt needs to be changed to high flow. No clot found. Plan to at some point discharge to a TCU. SW following. Will continue to monitor.

## 2018-10-19 NOTE — PROGRESS NOTES
[facility]  Infectious Disease Progress Note          Assessment and Plan:   IMPRESSION:   1.  73-year-old diabetic male with acute left leg and foot cellulitis, definite concern for third toe osteomyelitis with a grossly abnormal toe, some systemic toxicity with blood cultures pending.   2.  ANAPHYLACTIC PENICILLIN ALLERGY.   3.  Diabetes mellitus.   4.  Mild renal insufficiency, improved.   5.  Prior history of foot and leg cellulitis.   6 Hypoxia, some interstitial changes on imaging but doubt infection      RECOMMENDATIONS:   1. On ancef MSSA only in cx  2. Note operative findings, ., cultures MSSA, fever now resolving, po OK if disposition,   3 Doubt lungs are infection]  4 If disposition 2 weeks po keflex 500 qid            Interval History:   no new complaints and doing well; no cp, sob, n/v/d, or abd pain. Mild hypoxia, foot cx staph only sens pending, toe amp, fever down, biggest co chronic knee pain worse              Medications:       aspirin  325 mg Oral Daily     carvedilol  3.125 mg Oral BID w/meals     ceFAZolin  2 g Intravenous Q8H     diclofenac  2 g Transdermal 4x Daily     enoxaparin  40 mg Subcutaneous Q24H     finasteride  5 mg Oral Daily     furosemide  40 mg Oral Daily     gabapentin  100 mg Oral BID     gabapentin  200 mg Oral At Bedtime     hypromellose-dextran  1 drop Both Eyes QAM     insulin aspart  1-10 Units Subcutaneous TID AC     insulin aspart  1-7 Units Subcutaneous At Bedtime     lisinopril  20 mg Oral Daily     loratadine  10 mg Oral At Bedtime     metFORMIN  1,000 mg Oral BID w/meals     sodium chloride (PF)  3 mL Intracatheter Q8H     tamsulosin  0.4 mg Oral Daily                  Physical Exam:   Blood pressure 150/76, pulse 88, temperature 98.4  F (36.9  C), temperature source Oral, resp. rate 18, height 1.829 m (6'), weight 116.6 kg (257 lb), SpO2 93 %.  Wt Readings from Last 2 Encounters:   10/14/18 116.6 kg (257 lb)   08/27/18 118.3 kg (260 lb 14.4  oz)     Vital Signs with Ranges  Temp:  [97.3  F (36.3  C)-99.7  F (37.6  C)] 98.4  F (36.9  C)  Heart Rate:  [] 96  Resp:  [18-20] 18  BP: (150-157)/(76-80) 150/76  SpO2:  [85 %-96 %] 93 %    Constitutional: Awake, alert, cooperative, no apparent distress   Lungs: Clear to auscultation bilaterally, no crackles or wheezing   Cardiovascular: Regular rate and rhythm, normal S1 and S2, and no murmur noted   Abdomen: Normal bowel sounds, soft, non-distended, non-tender   Skin: No rashes, no cyanosis, some edema foot wrapped   Other:           Data:   All microbiology laboratory data reviewed.  Recent Labs   Lab Test  10/18/18   0739  10/17/18   1855  10/15/18   0602   WBC  11.4*  11.3*  17.1*   HGB  10.4*  10.0*  10.2*   HCT  33.8*  32.4*  32.5*   MCV  94  93  94   PLT  319  311  261     Recent Labs   Lab Test  10/19/18   0638  10/18/18   0739  10/17/18   0709   CR  1.03  1.09  1.15     No lab results found.  Recent Labs   Lab Test  10/17/18   1858  10/17/18   1854  10/15/18   1615  10/14/18   1455  10/14/18   1443  06/19/18   1459  06/19/18   1448  05/01/18   1610  05/01/18   1549   CULT  No growth after 2 days  No growth after 2 days  Moderate growth  Finegoldia magna (Peptostreptococcus parish)  Susceptibility testing not routinely done  *  Culture in progress  Moderate growth  Staphylococcus aureus  *  No growth after 5 days  No growth after 5 days  No growth  No growth  No growth  No growth  Light growth  Beta hemolytic Streptococcus group B  *

## 2018-10-19 NOTE — PROGRESS NOTES
[facility]  Hospitalist Progress Note  Lars Srinivasan MD 10/19/2018    Reason for Stay (Diagnosis): diabetic foot infection         Assessment and Plan:      Summary of Stay: Lance Ibrahim is a 73 year old diabetic male returned to the ED on 10/14/2018 with recurrent left lower extremity infection. Last hospitalization was in June, 2018 at which time he was treated with Clilndamycin and debridement by Podiatry. In the interim, he appears to have followed up with PANFILO James in August and was thought to have early changes compatible with Charcot Arthropathy.    The patient indicates that he has had shakes and chills since about 3 days PTA when he first noted discomfort in his left third toe.     In the ED, the patient was noted to have early sepsis with Lactic acid of 2.0, WBC 18K, tachycardia, and was therefore resuscitated with 30 ml/kg (3000 ml) crystalloid and was started on Vanco and Clindamycin.     Mr. Ibrahim was taken to the operating room on 10/16/2018 and underwent left third toe amputation.      Overnight, after surgery, he developed significant shortness of breath with marked hypoxia probably due to some volume excess that he received during resuscitationand a component of untreated BRIANNA.      He is much more limited physically than he had initially indicated.  He apparently has been unable to ambulate due to severe pain in his knees and spends most of his time sitting, and therefore, it is difficult to know if he had exertional angina-like symptoms.  Over the course of the hospital stay, it has become clear that the patient's right knee pain is also limiting.  Now that he has both lower extremities compromised, he can barely get across the room.  Initially he was reluctant to consider transitional care but at this point I believe he recognizes the need for assistance.    Problem List:   1. POD #3 s/p disarticulation of the third toe, left foot at MTP.   2. Early sepsis, probably  moderate, with tachycardia, elevated WBC, borderline lactic acid, DEAN. Very generously fluid resuscitated in the ED.   3. Acute hypoxic respiratory failure due to acute on chronic systolic heart failure which accounts for the patient's hypoxia in the past couple of days, following fluid resuscitation for sepsis.  Patient has improved with diuresis.     Echocardiogram shows an ejection fraction of 45-50%.    I note that he is on lisinopril 20 mg daily at baseline.  With this new diagnosis of systolic heart failure, he needs to start both the daily diuretic and beta-blocker.  4. DEAN resolved with fluids.  5. DM II, non-insulin dependent. Managed at baseline with metformin, 1000 BID  6. HTN. On Lisinopril alone.  7. Right knee osteoarthritis.  Patient is known to Dr. Cunha.      PLAN:  1.  Antibiotics narrowed to Ancef alone.  2.  Lasix 40 mg p.o. daily started this hospitalization.  Anticipate the patient will need to go home on Lasix.  3.  Coreg 6.25 mg twice daily.  We will need to titrate up on this.  4.  Anticipate the patient will need to follow-up with cardiology.  He may needs stress testing and or angiography, and should be referred to cardiology on discharge.   5.  I placed a consultation for orthopedics to see the patient early this morning.  It does not appear that they have, as he had.  They have not come by tomorrow morning to be reasonable to call him directly.    DVT Prophylaxis: Pneumatic Compression Devices  Code Status: Full Code  Discharge Dispo: At this time the patient indicates that he is willing to go to transitional care as he is recognizing he is unable to safely ambulate independently.  Estimated Disch Date / # of Days until Disch: Likely 1-2 more days.        Interval History (Subjective):      Today Mr. Ibrahim indicates that he feels unable to go home with the right right knee and left foot pain.  He is really unable to ambulate with any comfort.  He apparently had had plans to see   Alphonse in the office but missed that appointment because he was here.  He last had a knee injection about 6 weeks ago, he indicates that he did have a lot of improvement from it in the immediate post injection.  They have been talking about his impending need to pursue knee replacement.    Patient indicates that his breathing has been quite comfortable in the last several days.                  Physical Exam:      Last Vital Signs:  BP (!) 165/130 (BP Location: Left arm)  Pulse 88  Temp 96.1  F (35.6  C) (Oral)  Resp 18  Ht 1.829 m (6')  Wt 116.6 kg (257 lb)  SpO2 90%  BMI 34.86 kg/m2    I/O last 3 completed shifts:  In: 710 [P.O.:710]  Out: 1025 [Urine:1025]    Constitutional: Awake, alert, cooperative, no apparent distress.  I sat with the patient while he was in bed today for about 30 minutes.  I had taken him off of oxygen and he remained with oxygen saturation at about 80%.  He had no evident dyspnea at that time.   Respiratory: Clear to auscultation bilaterally, no crackles or wheezing   Cardiovascular: Regular rate and rhythm, normal S1 and S2, and no murmur noted   Abdomen: Normal bowel sounds, soft, non-distended, non-tender   Skin: No rashes, no cyanosis, dry to touch   Neuro: Alert and oriented x3.  Patient is generally weak.   Extremities:  Left lower extremity in a walking boot.  Right knee effusion is mild.  There is obvious tenderness along the joint line bilaterally.  Patella is non-ballotable.  There is no surrounding erythema or warmth.   Other(s):        All other systems: Negative          Medications:      All current medications were reviewed with changes reflected in problem list.         Data:      All new lab and imaging data was reviewed.   Labs/Imaging:  Results for orders placed or performed during the hospital encounter of 10/14/18 (from the past 24 hour(s))   Glucose by meter   Result Value Ref Range    Glucose 152 (H) 70 - 99 mg/dL   Glucose by meter   Result Value Ref Range     Glucose 148 (H) 70 - 99 mg/dL   Glucose by meter   Result Value Ref Range    Glucose 170 (H) 70 - 99 mg/dL   Basic metabolic panel   Result Value Ref Range    Sodium 133 133 - 144 mmol/L    Potassium 4.3 3.4 - 5.3 mmol/L    Chloride 99 94 - 109 mmol/L    Carbon Dioxide 26 20 - 32 mmol/L    Anion Gap 8 3 - 14 mmol/L    Glucose 136 (H) 70 - 99 mg/dL    Urea Nitrogen 16 7 - 30 mg/dL    Creatinine 1.03 0.66 - 1.25 mg/dL    GFR Estimate 71 >60 mL/min/1.7m2    GFR Estimate If Black 85 >60 mL/min/1.7m2    Calcium 8.8 8.5 - 10.1 mg/dL   Glucose by meter   Result Value Ref Range    Glucose 129 (H) 70 - 99 mg/dL   Glucose by meter   Result Value Ref Range    Glucose 177 (H) 70 - 99 mg/dL

## 2018-10-19 NOTE — PROGRESS NOTES
"BRIEF NUTRITION ASSESSMENT    REASON FOR ASSESSMENT:  LOS  CURRENT DIET AND NOURISHMENT ORDER:  Information obtained from patient   Patient is on a regular diet at home - takes Metformin as prescribed for DM management. Full diet history not obtained - patient most concerned with POC, wife, and discharge planning. Patient cares for wife at home (bedbound, details in 's note). Denies changes to intake or appetite PTA  Food allergies/intolerances: NKFA    Diet: Moderate consistent carb  Current Intake/Tolerance: Per flow sheet review, % intake for documented meals.    ANTHROPOMETRICS  Height: 6' 0\"  Weight: 116 kg   Body mass index is 34.86 kg/(m^2).  Weight Status:  Obesity Grade I BMI 30-34.9  Ideal body weight: 80.9 kg +/- 10%, 143% of IBW   Weight History:  Weight appears relatively stable with appropriate weight loss in last six months, as noted below  Wt Readings from Last 10 Encounters:   10/14/18 116.6 kg (257 lb)   08/27/18 118.3 kg (260 lb 14.4 oz)   07/16/18 117.1 kg (258 lb 3.2 oz)   06/19/18 122.1 kg (269 lb 1.6 oz)   05/14/18 117.3 kg (258 lb 8 oz)   05/01/18 119.2 kg (262 lb 12.8 oz)   02/15/18 120.9 kg (266 lb 9.6 oz)   12/28/17 120.7 kg (266 lb)   10/04/17 123 kg (271 lb 3.2 oz)   09/15/17 122 kg (269 lb)     LABS/MEDS/PHYSICAL FINDINGS:  Meds reviewed  Labs reviewed:  Creatinine (mg/dL)   Date Value   10/19/2018 1.03   10/18/2018 1.09   10/17/2018 1.15   10/16/2018 1.01   10/15/2018 0.92   10/14/2018 1.28 (H)     Glucose (mg/dL)   Date Value   10/19/2018 136 (H)   10/18/2018 141 (H)   10/16/2018 155 (H)   10/15/2018 142 (H)   10/14/2018 134 (H)   06/20/2018 137 (H)     Sodium (mmol/L)   Date Value   10/19/2018 133   10/18/2018 137   10/16/2018 134   10/15/2018 137   10/14/2018 137   06/20/2018 139     Potassium (mmol/L)   Date Value   10/19/2018 4.3   10/18/2018 3.8   10/16/2018 3.8   10/15/2018 4.0   10/14/2018 4.2   06/20/2018 3.8     Magnesium (mg/dL)   Date Value   05/01/2018 " 1.8       Lab Results   Component Value Date    A1C 6.4 10/17/2018    A1C 6.7 05/01/2018    A1C 6.3 10/07/2017    A1C 6.3 05/15/2017    A1C 5.6 10/12/2016       Recent Labs  Lab 10/19/18  0638 10/18/18  0739 10/16/18  0601 10/15/18  0602 10/14/18  1443   * 141* 155* 142* 134*       Malnutrition:  Patient does not meet two of the following criteria necessary for diagnosing malnutrition: significant weight loss, reduced intake, subcutaneous fat loss, muscle loss or fluid retention    INTERVENTION:  Nutrition Diagnosis:  No nutrition diagnosis at this time.    Implementation:  Nutrition Education:  Per MD order - encouraged protein source at each meal    Follow Up/Monitoring:   Progress towards goals will be monitored and evaluated per protocol and Practice Guidelines         Vivian Tracey RDN, LD, CNSC  Pager - 3rd floor/ICU: [phone]  Pager - All other floors: [phone]  Pager - Weekend/holiday: [phone]  Office: [phone]

## 2018-10-19 NOTE — PROGRESS NOTES
Your information has been submitted on October 19th, 2018 at 03:52:47 PM CDT. The confirmation number is [id number]

## 2018-10-19 NOTE — PLAN OF CARE
Problem: Patient Care Overview  Goal: Plan of Care/Patient Progress Review  Outcome: Improving  Pt A&Ox4, VSS  Except temp 99.7, with O2 SATS 90-95% ON 5 L via simple mask. Continuous pulse oximeter on. C/o pain to right knee  6/10 received 50 mg  PO tramadol with relief.  Left foot dressing C/D/I.  Denies numbness and tingling.  Lungs diminished.   At 2 am.  Up with assist of 2 with ceiling lift. PT has to see patient today. Has to have boot on left leg when OOB.  IV ancef administered as ordered.  Plan to discharge to TCU when ready.  Will continue to monitor.

## 2018-10-20 ENCOUNTER — APPOINTMENT (OUTPATIENT)
Dept: GENERAL RADIOLOGY | Facility: CLINIC | Age: 73
DRG: 853 | End: 2018-10-20
Attending: ORTHOPAEDIC SURGERY
Payer: COMMERCIAL

## 2018-10-20 ENCOUNTER — APPOINTMENT (OUTPATIENT)
Dept: PHYSICAL THERAPY | Facility: CLINIC | Age: 73
DRG: 853 | End: 2018-10-20
Payer: COMMERCIAL

## 2018-10-20 LAB
ANION GAP SERPL CALCULATED.3IONS-SCNC: 7 MMOL/L (ref 3–14)
APPEARANCE FLD: NORMAL
APPEARANCE FLD: NORMAL
BACTERIA SPEC CULT: NO GROWTH
BACTERIA SPEC CULT: NO GROWTH
BUN SERPL-MCNC: 20 MG/DL (ref 7–30)
CALCIUM SERPL-MCNC: 8.8 MG/DL (ref 8.5–10.1)
CHLORIDE SERPL-SCNC: 98 MMOL/L (ref 94–109)
CO2 SERPL-SCNC: 28 MMOL/L (ref 20–32)
COLOR FLD: NORMAL
COLOR FLD: YELLOW
CREAT SERPL-MCNC: 0.98 MG/DL (ref 0.66–1.25)
CRYSTALS SNV MICRO: ABNORMAL
CRYSTALS SNV MICRO: ABNORMAL
ERYTHROCYTE [DISTWIDTH] IN BLOOD BY AUTOMATED COUNT: 15.7 % (ref 10–15)
GFR SERPL CREATININE-BSD FRML MDRD: 75 ML/MIN/1.7M2
GLUCOSE BLDC GLUCOMTR-MCNC: 110 MG/DL (ref 70–99)
GLUCOSE BLDC GLUCOMTR-MCNC: 113 MG/DL (ref 70–99)
GLUCOSE BLDC GLUCOMTR-MCNC: 136 MG/DL (ref 70–99)
GLUCOSE BLDC GLUCOMTR-MCNC: 152 MG/DL (ref 70–99)
GLUCOSE BLDC GLUCOMTR-MCNC: 183 MG/DL (ref 70–99)
GLUCOSE SERPL-MCNC: 145 MG/DL (ref 70–99)
GRAM STN SPEC: NORMAL
HCT VFR BLD AUTO: 33.6 % (ref 40–53)
HGB BLD-MCNC: 10.5 G/DL (ref 13.3–17.7)
LACTATE BLD-SCNC: 2.1 MMOL/L (ref 0.7–2)
LACTATE BLD-SCNC: 2.7 MMOL/L (ref 0.7–2)
LYMPHOCYTES NFR FLD MANUAL: 5 %
LYMPHOCYTES NFR FLD MANUAL: 6 %
Lab: NORMAL
Lab: NORMAL
MAGNESIUM SERPL-MCNC: 2 MG/DL (ref 1.6–2.3)
MCH RBC QN AUTO: 28.8 PG (ref 26.5–33)
MCHC RBC AUTO-ENTMCNC: 31.3 G/DL (ref 31.5–36.5)
MCV RBC AUTO: 92 FL (ref 78–100)
MONOS+MACROS NFR FLD MANUAL: 4 %
MONOS+MACROS NFR FLD MANUAL: 9 %
NEUTS BAND NFR FLD MANUAL: 85 %
NEUTS BAND NFR FLD MANUAL: 91 %
PLATELET # BLD AUTO: 376 10E9/L (ref 150–450)
POTASSIUM SERPL-SCNC: 4.5 MMOL/L (ref 3.4–5.3)
RBC # BLD AUTO: 3.65 10E12/L (ref 4.4–5.9)
SODIUM SERPL-SCNC: 133 MMOL/L (ref 133–144)
SPECIMEN SOURCE FLD: NORMAL
SPECIMEN SOURCE FLD: NORMAL
SPECIMEN SOURCE: NORMAL
WBC # BLD AUTO: 12.6 10E9/L (ref 4–11)
WBC # FLD AUTO: 9518 /UL
WBC # FLD AUTO: NORMAL /UL

## 2018-10-20 PROCEDURE — 83605 ASSAY OF LACTIC ACID: CPT | Performed by: INTERNAL MEDICINE

## 2018-10-20 PROCEDURE — 73562 X-RAY EXAM OF KNEE 3: CPT | Mod: 50

## 2018-10-20 PROCEDURE — 36415 COLL VENOUS BLD VENIPUNCTURE: CPT | Performed by: INTERNAL MEDICINE

## 2018-10-20 PROCEDURE — 25000128 H RX IP 250 OP 636: Performed by: INTERNAL MEDICINE

## 2018-10-20 PROCEDURE — 25000132 ZZH RX MED GY IP 250 OP 250 PS 637: Performed by: INTERNAL MEDICINE

## 2018-10-20 PROCEDURE — 97530 THERAPEUTIC ACTIVITIES: CPT | Mod: GP | Performed by: PHYSICAL THERAPIST

## 2018-10-20 PROCEDURE — 0S9C3ZZ DRAINAGE OF RIGHT KNEE JOINT, PERCUTANEOUS APPROACH: ICD-10-PCS | Performed by: ORTHOPAEDIC SURGERY

## 2018-10-20 PROCEDURE — 89051 BODY FLUID CELL COUNT: CPT | Performed by: ORTHOPAEDIC SURGERY

## 2018-10-20 PROCEDURE — 0S9D3ZZ DRAINAGE OF LEFT KNEE JOINT, PERCUTANEOUS APPROACH: ICD-10-PCS | Performed by: ORTHOPAEDIC SURGERY

## 2018-10-20 PROCEDURE — 85027 COMPLETE CBC AUTOMATED: CPT | Performed by: INTERNAL MEDICINE

## 2018-10-20 PROCEDURE — 99207 ZZC CDG-MDM COMPONENT: MEETS LOW - DOWN CODED: CPT | Performed by: INTERNAL MEDICINE

## 2018-10-20 PROCEDURE — 99232 SBSQ HOSP IP/OBS MODERATE 35: CPT | Performed by: INTERNAL MEDICINE

## 2018-10-20 PROCEDURE — 40000193 ZZH STATISTIC PT WARD VISIT: Performed by: PHYSICAL THERAPIST

## 2018-10-20 PROCEDURE — 87015 SPECIMEN INFECT AGNT CONCNTJ: CPT | Performed by: ORTHOPAEDIC SURGERY

## 2018-10-20 PROCEDURE — 87205 SMEAR GRAM STAIN: CPT | Performed by: ORTHOPAEDIC SURGERY

## 2018-10-20 PROCEDURE — 00000146 ZZHCL STATISTIC GLUCOSE BY METER IP

## 2018-10-20 PROCEDURE — 12000007 ZZH R&B INTERMEDIATE

## 2018-10-20 PROCEDURE — 83735 ASSAY OF MAGNESIUM: CPT | Performed by: INTERNAL MEDICINE

## 2018-10-20 PROCEDURE — 87070 CULTURE OTHR SPECIMN AEROBIC: CPT | Performed by: ORTHOPAEDIC SURGERY

## 2018-10-20 PROCEDURE — 80048 BASIC METABOLIC PNL TOTAL CA: CPT | Performed by: INTERNAL MEDICINE

## 2018-10-20 PROCEDURE — 89060 EXAM SYNOVIAL FLUID CRYSTALS: CPT | Performed by: ORTHOPAEDIC SURGERY

## 2018-10-20 PROCEDURE — 89060 EXAM SYNOVIAL FLUID CRYSTALS: CPT | Performed by: INTERNAL MEDICINE

## 2018-10-20 RX ADMIN — FINASTERIDE 5 MG: 5 TABLET, FILM COATED ORAL at 09:34

## 2018-10-20 RX ADMIN — LORATADINE 10 MG: 10 TABLET ORAL at 22:07

## 2018-10-20 RX ADMIN — ASPIRIN 325 MG ORAL TABLET 325 MG: 325 PILL ORAL at 08:37

## 2018-10-20 RX ADMIN — DICLOFENAC 2 G: 10 GEL TOPICAL at 17:53

## 2018-10-20 RX ADMIN — GABAPENTIN 100 MG: 100 CAPSULE ORAL at 12:04

## 2018-10-20 RX ADMIN — CARVEDILOL 3.12 MG: 3.12 TABLET, FILM COATED ORAL at 17:54

## 2018-10-20 RX ADMIN — DICLOFENAC 2 G: 10 GEL TOPICAL at 12:05

## 2018-10-20 RX ADMIN — FUROSEMIDE 40 MG: 40 TABLET ORAL at 08:36

## 2018-10-20 RX ADMIN — Medication 2.5 MG: at 05:02

## 2018-10-20 RX ADMIN — GABAPENTIN 200 MG: 100 CAPSULE ORAL at 22:07

## 2018-10-20 RX ADMIN — Medication 2.5 MG: at 15:04

## 2018-10-20 RX ADMIN — DICLOFENAC 2 G: 10 GEL TOPICAL at 20:15

## 2018-10-20 RX ADMIN — METFORMIN HYDROCHLORIDE 1000 MG: 500 TABLET ORAL at 17:54

## 2018-10-20 RX ADMIN — CEFAZOLIN SODIUM 2 G: 2 INJECTION, SOLUTION INTRAVENOUS at 12:04

## 2018-10-20 RX ADMIN — CARVEDILOL 3.12 MG: 3.12 TABLET, FILM COATED ORAL at 08:36

## 2018-10-20 RX ADMIN — METFORMIN HYDROCHLORIDE 1000 MG: 500 TABLET ORAL at 08:36

## 2018-10-20 RX ADMIN — CEFAZOLIN SODIUM 2 G: 2 INJECTION, SOLUTION INTRAVENOUS at 20:15

## 2018-10-20 RX ADMIN — INSULIN ASPART 1 UNITS: 100 INJECTION, SOLUTION INTRAVENOUS; SUBCUTANEOUS at 17:53

## 2018-10-20 RX ADMIN — TAMSULOSIN HYDROCHLORIDE 0.4 MG: 0.4 CAPSULE ORAL at 08:37

## 2018-10-20 RX ADMIN — DICLOFENAC 2 G: 10 GEL TOPICAL at 08:37

## 2018-10-20 RX ADMIN — SODIUM CHLORIDE 500 ML: 9 INJECTION, SOLUTION INTRAVENOUS at 09:20

## 2018-10-20 RX ADMIN — Medication 2.5 MG: at 10:22

## 2018-10-20 RX ADMIN — ENOXAPARIN SODIUM 40 MG: 40 INJECTION SUBCUTANEOUS at 20:15

## 2018-10-20 RX ADMIN — GABAPENTIN 100 MG: 100 CAPSULE ORAL at 08:37

## 2018-10-20 RX ADMIN — CEFAZOLIN SODIUM 2 G: 2 INJECTION, SOLUTION INTRAVENOUS at 05:02

## 2018-10-20 RX ADMIN — LISINOPRIL 20 MG: 20 TABLET ORAL at 08:36

## 2018-10-20 RX ADMIN — Medication 2.5 MG: at 23:45

## 2018-10-20 RX ADMIN — ACETAMINOPHEN 650 MG: 325 TABLET, FILM COATED ORAL at 12:13

## 2018-10-20 ASSESSMENT — ACTIVITIES OF DAILY LIVING (ADL)
ADLS_ACUITY_SCORE: 11
ADLS_ACUITY_SCORE: 13

## 2018-10-20 NOTE — PROGRESS NOTES
Pc to [facility] regarding bed available. Spoke with Annie who reported bed is available for pt tomorrow.

## 2018-10-20 NOTE — PLAN OF CARE
Problem: Patient Care Overview  Goal: Plan of Care/Patient Progress Review  Patient alert and oriented. Assist of 2.  Mod carb diet, .  Pain managed with prn ice and oxycodone.  Lung sounds clear/diminshed.  Bowel sounds active.  O2 2L via NC otherwise VSS.  CMS intact, dressing CDI to LLE.  Will continue to monitor.

## 2018-10-20 NOTE — CONSULTS
[facility] Orthopedic Consultation    Lance Ibrahim MRN# [id number]   Age: 73 year old YOB: 1945     Date of Admission:  10/14/2018    Reason for consult: Bilateral knee pain, swelling       Requesting physician: Craig       Level of consult: Consult, follow and place orders           Assessment and Plan:   Assessment:   Bilateral knee pain, concerning for acute flare of DJD vs infection      Plan:   XR bilateral knees  Will aspirate bilateral knees and send for gram stain, culture, cell count to r/o septic joint           Chief Complaint:   Bilateral knee pain         History of Present Illness:   This patient is a 73 year old male who presents with the following condition requiring a hospital admission:    72 yo M who is complaining of bilateral knee pain R>L since he was admitted to the hospital. He was admitted on 10/14/18 due to a R foot infection and underwent a debridement/amputation by podiatry of his R foot. Since then, he has had severe pain in his R knee > L knee and inability to bear weight or bend his knee. He is also complaining of swelling. He denies any injuries to his knee but he does have a history of bilateral knee djd that was treated with cortisone injections at [facility]. He currently denies any fevers or chills but is currently on antibiotics. No numbness/tingling but he does have baseline DM. He does have an elevated white count but is also s/p recent foot surgery for infection.          Past Medical History:     Past Medical History:   Diagnosis Date     Chronic rhinitis      Diabetes mellitus (H)      HTN (hypertension)      Hypertrophy of prostate with urinary obstruction and other lower urinary tract symptoms (LUTS)      TIA (transient ischaemic attack) 1993     Unspecified transient cerebral ischemia 1993    No definite source found             Past Surgical History:     Past Surgical History:   Procedure Laterality Date     AMPUTATE TOE(S) Left 10/15/2018    Procedure:  "AMPUTATE TOE(S);  Left third toe amputation;  Surgeon: Ayaka Azevedo DPM, Podiatry/Foot and Ankle Surgery;  Location: [facility] OR     C NONSPECIFIC PROCEDURE      Diastasis recti repair     C NONSPECIFIC PROCEDURE      Ventral hernia repair     C NONSPECIFIC PROCEDURE      ORIF of left shoulder     COLONOSCOPY  3/9/2013    Procedure: COLONOSCOPY;  COLONOSCOPY;  Surgeon: Chau Hogan MD;  Location:  GI     EYE SURGERY  2017    left eye     FOOT SURGERY  2013    cyst removal      HERNIA REPAIR  2004    ventral              Social History:     Social History   Substance Use Topics     Smoking status: Former Smoker     Quit date: 3/17/1993     Smokeless tobacco: Never Used     Alcohol use Yes      Comment: Occ, Once a month             Family History:     Family History   Problem Relation Age of Onset     Family History Negative Mother       86 yo     Cancer Father       76 yo brain     Diabetes Maternal Grandfather       91 yo     Alcohol/Drug Paternal Grandfather                   Immunizations:     VACCINE/DOSE   Diptheria   DPT   DTAP   HBIG   Hepatitis A   Hepatitis B   HIB   Influenza   Measles   Meningococcal   MMR   Mumps   Pneumococcal   Polio   Rubella   Small Pox   TDAP   Varicella   Zoster             Allergies:     Allergies   Allergen Reactions     Penicillins      \"anaphylactic\"     Sulfa Drugs      \"itchy rash, swelling of face and hands\"             Medications:     Current Facility-Administered Medications   Medication     acetaminophen (TYLENOL) tablet 650 mg     aspirin tablet 325 mg     carvedilol (COREG) tablet 3.125 mg     ceFAZolin (ANCEF) intermittent infusion 2 g in 100 mL dextrose PRE-MIX     glucose gel 15-30 g    Or     dextrose 50 % injection 25-50 mL    Or     glucagon injection 1 mg     diclofenac (VOLTAREN) 1 % topical gel 2 g     enoxaparin (LOVENOX) injection 40 mg     finasteride (PROSCAR) tablet 5 mg     gabapentin (NEURONTIN) " capsule 100 mg     gabapentin (NEURONTIN) capsule 200 mg     hydrALAZINE (APRESOLINE) injection 10 mg     hypromellose-dextran (ARTIFICAL TEARS) 0.1-0.3 % ophthalmic solution 1 drop     insulin aspart (NovoLOG) inj (RAPID ACTING)     insulin aspart (NovoLOG) inj (RAPID ACTING)     lidocaine (LMX4) cream     lidocaine 1 % 1 mL     lisinopril (PRINIVIL/ZESTRIL) tablet 20 mg     loratadine (CLARITIN) tablet 10 mg     melatonin tablet 1 mg     metFORMIN (GLUCOPHAGE) tablet 1,000 mg     naloxone (NARCAN) injection 0.1-0.4 mg     ondansetron (ZOFRAN-ODT) ODT tab 4 mg    Or     ondansetron (ZOFRAN) injection 4 mg     oxyCODONE IR (ROXICODONE) half-tab 2.5 mg     polyethylene glycol (MIRALAX/GLYCOLAX) Packet 17 g     senna-docusate (SENOKOT-S;PERICOLACE) 8.6-50 MG per tablet 1 tablet    Or     senna-docusate (SENOKOT-S;PERICOLACE) 8.6-50 MG per tablet 2 tablet     sodium chloride (PF) 0.9% PF flush 3 mL     sodium chloride (PF) 0.9% PF flush 3 mL     tamsulosin (FLOMAX) capsule 0.4 mg     traMADol (ULTRAM) tablet 50 mg             Review of Systems:   All review of systems was performed and was negative except as per hpi            Physical Exam:   All vitals have been reviewed  Patient Vitals for the past 24 hrs:   BP Temp Temp src Pulse Heart Rate Resp SpO2 Weight   10/20/18 1213 142/70 98.8  F (37.1  C) Oral - 98 18 (!) 89 % -   10/20/18 0934 - - - - - - - 115.2 kg (254 lb)   10/20/18 0932 159/71 98.8  F (37.1  C) Oral - 102 19 90 % -   10/20/18 0814 167/75 97.7  F (36.5  C) Oral - 104 18 90 % -   10/20/18 0437 - - - - - 18 91 % -   10/19/18 2213 - - - - - - 91 % -   10/19/18 2209 130/65 98.6  F (37  C) Oral - 89 18 90 % -   10/19/18 2208 - - - - - - (!) 89 % -   10/19/18 2139 - - - - 90 - 90 % -   10/19/18 1843 138/68 - - 109 - - - -   10/19/18 1621 (!) 165/130 96.1  F (35.6  C) Oral - 95 18 90 % -       Intake/Output Summary (Last 24 hours) at 10/20/18 1327  Last data filed at 10/20/18 1140   Gross per 24 hour   Intake                 0 ml   Output             1460 ml   Net            -1460 ml     NAD  nonlabored breathing  No peripheral edema  Skin intact  White sclera  RLE:  +pain with attempted ROM of knee  ROM 5-20 and quite painful  +effusion  +diffuse ttp  No erythema  +Df/PF  Skin intact  LLE:  +pain with ROM  ROM 0-80, painful  +effusion and diffuse ttp  No erythema  +DF/PF  Sensation intact throughout  Skin intact            Data:   All laboratory data reviewed  Results for orders placed or performed during the hospital encounter of 10/14/18   Foot XR, G/E 3 views, left    Narrative    LEFT FOOT THREE OR MORE VIEWS    10/14/2018 4:13 PM     HISTORY: Ulceration distal third toe, marked cellulitis, question  fracture or subcutaneous air.      COMPARISON: 6/19/2018.      Impression    IMPRESSION: No fracture. There is some cortical erosion and bony  irregularity along the distal third toe distal phalanx that could  represent osteomyelitis. This appears new since the prior examination.  There is soft tissue swelling in this area.       KEL SALEH MD   XR Tibia & Fibula Left 2 Views    Narrative    LEFT TIBIA AND FIBULA TWO VIEWS     10/14/2018 4:13 PM     HISTORY: Cellulitis, question subcutaneous air.    COMPARISON: None.      Impression    IMPRESSION: No fracture, no destructive-appearing bony lesion is seen.  Vascular calcifications. Detection of subcutaneous gas is limited and  not clearly evident. Small subcutaneous gas cannot be entirely  excluded.       KEL SALEH MD   MR Foot Left w/o & w Contrast    Narrative    MR FOOT LEFT WITH AND WITHOUT CONTRAST October 15, 2018 9:28 AM    HISTORY: Evaluate for osteomyelitis.      COMPARISON: X-ray from 10/14/2018.    TECHNIQUE: Routine multiplanar, multisequence imaging was performed.  11 mL Gadavist.    FINDINGS: Prominent first MTP joint degenerative changes involving the  sesamoids and the first MTP joint. There is a joint effusion here.  There is abnormal bone marrow edema  in the proximal phalanx of the  great toe that could be degenerative in nature or related to  osteomyelitis. Remaining bone marrow signal is normal.    There is abnormal edema in the distal phalanx of the third toe as well  suspicious for osteomyelitis. There is diffuse prominent subcutaneous  edema throughout the foot with subcutaneous edema most prominent in  the third toe. No evidence of abscess.      Impression    IMPRESSION:   1. Abnormal bone marrow edema in the proximal phalanx of the great  toe. There are prominent first MTP joint degenerative changes and this  edema could be degenerative in nature but is slightly more prominent  than typically seen and osteomyelitis could be present in the proximal  phalanx of the great toe as well.  2. There is abnormal bone marrow edema in the distal phalanx of the  third toe with surrounding soft tissue edema suspicious for  osteomyelitis.  3. Diffuse subcutaneous edema consistent with cellulitis. No evidence  of abscess.    GREG ORTIZ MD   US Lower Extremity Venous Duplex Left    Narrative    ULTRASOUND VENOUS LOWER EXTREMITY UNILATERAL LEFT October 15, 2018  1:34 AM     HISTORY: Rule out deep vein thrombosis, leg swelling.     COMPARISON: None.    TECHNIQUE: Ultrasound gray scale, Color Doppler flow, and spectral  Doppler waveform analysis performed.    FINDINGS: The left common femoral, superficial femoral, popliteal and  posterior tibial veins are patent and fully compressible and  demonstrate normal venous Doppler flow. The visualized greater  saphenous vein is negative for thrombus.      Impression    IMPRESSION: No deep vein thrombosis demonstrated.    CATRINA VANG MD   XR Chest Port 1 View    Narrative    CHEST SINGLE VIEW PORTABLE  10/16/2018 3:45 AM     HISTORY: Dyspnea. Hypoxia.    COMPARISON: None.    FINDINGS: Moderately increased interstitial opacities in both lungs.  Mild cardiomegaly. Atherosclerotic calcification in the thoracic  aorta. Two  surgical screws in the left humeral head.      Impression    IMPRESSION: Moderately increased interstitial opacities in both lungs,  likely representing interstitial pulmonary edema.    NATALIE MADRIGAL MD   XR Foot Port Left 3 Views    Narrative    FOOT PORTABLE LEFT THREE VIEWS  10/17/2018 5:57 PM     HISTORY: Postoperative. New base line.       Impression    IMPRESSION: Third toe amputation at the metatarsophalangeal joint, new  since 10/14/2018. Bunionectomy and hallux valgus, unchanged.    SOPHY HOLCOMB MD   XR Chest Port 1 View    Narrative    XR CHEST PORT 1 VW  10/17/2018 7:50 PM     HISTORY:  Hypoxia;     COMPARISON: Film dated 10/16/2018    FINDINGS:  The heart is enlarged. When compared to the previous film,  there has been an improvement in the interstitial edema. No  alveolar-type infiltrates are seen.      Impression    IMPRESSION:  1. Cardiomegaly.  2. Improved interstitial edema.    TUCKER BO MD   CT Chest Pulmonary Embolism w Contrast    Narrative    CT CHEST WITH CONTRAST  10/17/2018 11:14 PM     HISTORY: Hypoxia. Tachycardia.    COMPARISON: 10/22/2016 - CT abdomen and pelvis    TECHNIQUE: Following the uneventful administration of 80 mL Isovue-370  intravenous contrast, helical sections were acquired through the lungs  according to the pulmonary embolism protocol. Coronal reconstructions  were generated. Radiation dose for this scan was reduced using  automated exposure control, adjustment of the mA and/or kV according  to the patient's size, or iterative reconstruction technique.     FINDINGS: The central pulmonary arteries are mildly large in caliber.  No visualized pulmonary embolus. The thoracic aorta is normal in  caliber without dissection. Atherosclerotic calcification in the  thoracic aorta and coronary arteries.    Mild to moderate interstitial thickening and ground-glass opacities  scattered throughout both lungs with an upper lobe predominance. Very  small bilateral pleural effusions  with adjacent atelectasis. No  pericardial effusion. A few nonspecific borderline and mildly enlarged  mediastinal and bilateral hilar lymph nodes. Two surgical screws are  present in the left humeral head.    Scan through the upper abdomen is significant for mild nodularity of  both adrenal glands that most likely relates to adenomas and a 3 cm  cyst in the upper pole of the left kidney.      Impression    IMPRESSION:  1. Mild to moderate interstitial thickening and ground-glass opacities  scattered within both lungs. These most likely relate to pulmonary  edema, but could also be infectious or inflammatory in etiology.  2. Very small bilateral pleural effusions.  3. No visualized pulmonary embolus.    NATALIE MADRIGAL MD   CBC + differential   Result Value Ref Range    WBC 18.0 (H) 4.0 - 11.0 10e9/L    RBC Count 4.24 (L) 4.4 - 5.9 10e12/L    Hemoglobin 12.2 (L) 13.3 - 17.7 g/dL    Hematocrit 39.1 (L) 40.0 - 53.0 %    MCV 92 78 - 100 fl    MCH 28.8 26.5 - 33.0 pg    MCHC 31.2 (L) 31.5 - 36.5 g/dL    RDW 15.9 (H) 10.0 - 15.0 %    Platelet Count 323 150 - 450 10e9/L    Diff Method Automated Method     % Neutrophils 87.5 %    % Lymphocytes 4.3 %    % Monocytes 6.8 %    % Eosinophils 0.2 %    % Basophils 0.3 %    % Immature Granulocytes 0.9 %    Nucleated RBCs 0 0 /100    Absolute Neutrophil 15.8 (H) 1.6 - 8.3 10e9/L    Absolute Lymphocytes 0.8 0.8 - 5.3 10e9/L    Absolute Monocytes 1.2 0.0 - 1.3 10e9/L    Absolute Eosinophils 0.0 0.0 - 0.7 10e9/L    Absolute Basophils 0.1 0.0 - 0.2 10e9/L    Abs Immature Granulocytes 0.2 0 - 0.4 10e9/L    Absolute Nucleated RBC 0.0    Basic metabolic panel (BMP)   Result Value Ref Range    Sodium 137 133 - 144 mmol/L    Potassium 4.2 3.4 - 5.3 mmol/L    Chloride 104 94 - 109 mmol/L    Carbon Dioxide 26 20 - 32 mmol/L    Anion Gap 7 3 - 14 mmol/L    Glucose 134 (H) 70 - 99 mg/dL    Urea Nitrogen 18 7 - 30 mg/dL    Creatinine 1.28 (H) 0.66 - 1.25 mg/dL    GFR Estimate 55 (L) >60  mL/min/1.7m2    GFR Estimate If Black 66 >60 mL/min/1.7m2    Calcium 9.4 8.5 - 10.1 mg/dL   Lactic acid   Result Value Ref Range    Lactic Acid 2.0 0.4 - 2.0 mmol/L   Glucose by meter   Result Value Ref Range    Glucose 134 (H) 70 - 99 mg/dL   Glucose by meter   Result Value Ref Range    Glucose 174 (H) 70 - 99 mg/dL   Basic metabolic panel   Result Value Ref Range    Sodium 137 133 - 144 mmol/L    Potassium 4.0 3.4 - 5.3 mmol/L    Chloride 107 94 - 109 mmol/L    Carbon Dioxide 21 20 - 32 mmol/L    Anion Gap 9 3 - 14 mmol/L    Glucose 142 (H) 70 - 99 mg/dL    Urea Nitrogen 10 7 - 30 mg/dL    Creatinine 0.92 0.66 - 1.25 mg/dL    GFR Estimate 80 >60 mL/min/1.7m2    GFR Estimate If Black >90 >60 mL/min/1.7m2    Calcium 8.4 (L) 8.5 - 10.1 mg/dL   CBC with platelets differential   Result Value Ref Range    WBC 17.1 (H) 4.0 - 11.0 10e9/L    RBC Count 3.45 (L) 4.4 - 5.9 10e12/L    Hemoglobin 10.2 (L) 13.3 - 17.7 g/dL    Hematocrit 32.5 (L) 40.0 - 53.0 %    MCV 94 78 - 100 fl    MCH 29.6 26.5 - 33.0 pg    MCHC 31.4 (L) 31.5 - 36.5 g/dL    RDW 15.9 (H) 10.0 - 15.0 %    Platelet Count 261 150 - 450 10e9/L    Diff Method Automated Method     % Neutrophils 83.7 %    % Lymphocytes 7.8 %    % Monocytes 6.8 %    % Eosinophils 0.5 %    % Basophils 0.2 %    % Immature Granulocytes 1.0 %    Nucleated RBCs 0 0 /100    Absolute Neutrophil 14.3 (H) 1.6 - 8.3 10e9/L    Absolute Lymphocytes 1.3 0.8 - 5.3 10e9/L    Absolute Monocytes 1.2 0.0 - 1.3 10e9/L    Absolute Eosinophils 0.1 0.0 - 0.7 10e9/L    Absolute Basophils 0.0 0.0 - 0.2 10e9/L    Abs Immature Granulocytes 0.2 0 - 0.4 10e9/L    Absolute Nucleated RBC 0.0    Lactic acid level STAT for sepsis protocol   Result Value Ref Range    Lactate for Sepsis Protocol 0.9 0.7 - 2.0 mmol/L   Glucose by meter   Result Value Ref Range    Glucose 137 (H) 70 - 99 mg/dL   Glucose by meter   Result Value Ref Range    Glucose 127 (H) 70 - 99 mg/dL   Glucose by meter   Result Value Ref Range     "Glucose 90 70 - 99 mg/dL   Glucose by meter   Result Value Ref Range    Glucose 95 70 - 99 mg/dL   Surgical pathology exam   Result Value Ref Range    Copath Report       Patient Name: CURTIS SAHU  MR#: [id number]  Specimen #: [id number]  Collected: 10/15/2018  Received: 10/16/2018  Reported: 10/17/2018 14:15  Ordering Phy(s): SANDRINE RANDHAWA    For improved result formatting, select 'View Enhanced Report Format' under   Linked Documents section.    SPECIMEN(S):  Amputation, left third toe    FINAL DIAGNOSIS:  Toe, left third, amputation:  - Ulceration, acute inflammation and abscess.  - Epithelial hyperplasia.  - Osteomyelitis.  - Proximal margin viable.  - Negative for tumors.    Electronically signed out by:    Sebastian Terrazas M.D.    CLINICAL HISTORY:  Assessment bone infection.    GROSS:  The specimen is received in formalin labeled with the patient's name,   identifying information and designated  \"left third toe\".  It consists of a 7.5 x 3 x 2.5 cm curved disarticulated   digit, partially surfaced by  tan-white, thickened irregular skin.  A nail cannot be identified.    Located distally and the 3 cm crusty,  ulcerative irregular area.  This ar ea is located approximately 1 cm from   the blue inked, grossly unremarkable  soft tissue resection margin.  Sectioning this area reveals an abscess   cavity.  A longitudinal section of  ulcer with blue inked soft tissue resection margin and underlying bone is   submitted in one block, following  decalcification. (Dictated by: NED Jones 10/16/2018 02:36 PM)    MICROSCOPIC:  Microscopic examination of decalcified section was performed.    CPT Codes:  A: 44717-VG3, 55412-VOJ    TESTING LAB LOCATION:  [facility] [address] [place]  [id number] [phone]    COLLECTION SITE:  Client: [facility]  Location: [facility] (R)     Glucose by meter   Result Value Ref Range    Glucose 117 (H) 70 - 99 mg/dL   Glucose by meter "   Result Value Ref Range    Glucose 171 (H) 70 - 99 mg/dL   Basic metabolic panel   Result Value Ref Range    Sodium 134 133 - 144 mmol/L    Potassium 3.8 3.4 - 5.3 mmol/L    Chloride 103 94 - 109 mmol/L    Carbon Dioxide 24 20 - 32 mmol/L    Anion Gap 7 3 - 14 mmol/L    Glucose 155 (H) 70 - 99 mg/dL    Urea Nitrogen 8 7 - 30 mg/dL    Creatinine 1.01 0.66 - 1.25 mg/dL    GFR Estimate 72 >60 mL/min/1.7m2    GFR Estimate If Black 87 >60 mL/min/1.7m2    Calcium 8.6 8.5 - 10.1 mg/dL   Glucose by meter   Result Value Ref Range    Glucose 142 (H) 70 - 99 mg/dL   Lactic acid level STAT for sepsis protocol   Result Value Ref Range    Lactate for Sepsis Protocol 1.1 0.7 - 2.0 mmol/L   Glucose by meter   Result Value Ref Range    Glucose 154 (H) 70 - 99 mg/dL   Vancomycin level   Result Value Ref Range    Vancomycin Level 19.5 mg/L   Glucose by meter   Result Value Ref Range    Glucose 135 (H) 70 - 99 mg/dL   Glucose by meter   Result Value Ref Range    Glucose 162 (H) 70 - 99 mg/dL   Glucose by meter   Result Value Ref Range    Glucose 153 (H) 70 - 99 mg/dL   Creatinine   Result Value Ref Range    Creatinine 1.15 0.66 - 1.25 mg/dL    GFR Estimate 62 >60 mL/min/1.7m2    GFR Estimate If Black 75 >60 mL/min/1.7m2   Lactic acid whole blood   Result Value Ref Range    Lactic Acid 0.6 (L) 0.7 - 2.0 mmol/L   Glucose by meter   Result Value Ref Range    Glucose 142 (H) 70 - 99 mg/dL   Glucose by meter   Result Value Ref Range    Glucose 145 (H) 70 - 99 mg/dL   Glucose by meter   Result Value Ref Range    Glucose 127 (H) 70 - 99 mg/dL   Glucose by meter   Result Value Ref Range    Glucose 188 (H) 70 - 99 mg/dL   Vancomycin level   Result Value Ref Range    Vancomycin Level 23.2 mg/L   Hemoglobin A1c   Result Value Ref Range    Hemoglobin A1C 6.4 (H) 0 - 5.6 %   Lactic acid level STAT for sepsis protocol   Result Value Ref Range    Lactate for Sepsis Protocol 1.1 0.7 - 2.0 mmol/L   Glucose by meter   Result Value Ref Range    Glucose  139 (H) 70 - 99 mg/dL   CBC with platelets   Result Value Ref Range    WBC 11.3 (H) 4.0 - 11.0 10e9/L    RBC Count 3.47 (L) 4.4 - 5.9 10e12/L    Hemoglobin 10.0 (L) 13.3 - 17.7 g/dL    Hematocrit 32.4 (L) 40.0 - 53.0 %    MCV 93 78 - 100 fl    MCH 28.8 26.5 - 33.0 pg    MCHC 30.9 (L) 31.5 - 36.5 g/dL    RDW 15.8 (H) 10.0 - 15.0 %    Platelet Count 311 150 - 450 10e9/L   Blood gas arterial with oxyhemoglobin   Result Value Ref Range    pH Arterial 7.48 (H) 7.35 - 7.45 pH    pCO2 Arterial 35 35 - 45 mm Hg    pO2 Arterial 44 (L) 80 - 105 mm Hg    Bicarbonate Arterial 26 21 - 28 mmol/L    FIO2 NC     Oxyhemoglobin Arterial 81 (L) 92 - 100 %    Base Excess Art 2.6 mmol/L   D dimer quantitative   Result Value Ref Range    D Dimer 2.6 (H) 0.0 - 0.50 ug/ml FEU   Glucose by meter   Result Value Ref Range    Glucose 255 (H) 70 - 99 mg/dL   CBC with platelets differential   Result Value Ref Range    WBC 11.4 (H) 4.0 - 11.0 10e9/L    RBC Count 3.61 (L) 4.4 - 5.9 10e12/L    Hemoglobin 10.4 (L) 13.3 - 17.7 g/dL    Hematocrit 33.8 (L) 40.0 - 53.0 %    MCV 94 78 - 100 fl    MCH 28.8 26.5 - 33.0 pg    MCHC 30.8 (L) 31.5 - 36.5 g/dL    RDW 15.8 (H) 10.0 - 15.0 %    Platelet Count 319 150 - 450 10e9/L    Diff Method Automated Method     % Neutrophils 76.5 %    % Lymphocytes 12.2 %    % Monocytes 8.0 %    % Eosinophils 1.3 %    % Basophils 0.5 %    % Immature Granulocytes 1.5 %    Nucleated RBCs 0 0 /100    Absolute Neutrophil 8.7 (H) 1.6 - 8.3 10e9/L    Absolute Lymphocytes 1.4 0.8 - 5.3 10e9/L    Absolute Monocytes 0.9 0.0 - 1.3 10e9/L    Absolute Eosinophils 0.2 0.0 - 0.7 10e9/L    Absolute Basophils 0.1 0.0 - 0.2 10e9/L    Abs Immature Granulocytes 0.2 0 - 0.4 10e9/L    Absolute Nucleated RBC 0.0    Basic metabolic panel   Result Value Ref Range    Sodium 137 133 - 144 mmol/L    Potassium 3.8 3.4 - 5.3 mmol/L    Chloride 101 94 - 109 mmol/L    Carbon Dioxide 28 20 - 32 mmol/L    Anion Gap 8 3 - 14 mmol/L    Glucose 141 (H) 70 - 99  mg/dL    Urea Nitrogen 15 7 - 30 mg/dL    Creatinine 1.09 0.66 - 1.25 mg/dL    GFR Estimate 66 >60 mL/min/1.7m2    GFR Estimate If Black 80 >60 mL/min/1.7m2    Calcium 8.3 (L) 8.5 - 10.1 mg/dL     *Note: Due to a large number of results and/or encounters for the requested time period, some results have not been displayed. A complete set of results can be found in Results Review.          Attestation:  Amount of time performed on this consult: 30 minutes.    Manuel Elena MD

## 2018-10-20 NOTE — PLAN OF CARE
Problem: Patient Care Overview  Goal: Plan of Care/Patient Progress Review  Outcome: No Change  Pt A&Ox4. Up with mechanical lift.   Reported 8/10 pain to bilateral knees. Gien oxycodone and tylenol  And voltarin gel with mild relief. Ortho came to perform knee aspiration. Xray complete. Pt repoted 6.5/10 pain after fluid removal. Knees are moderateley swollen.  LLE surgical  Dressing is CDI. Elvated with pillows. Pt on 1L/min oxygen, sating 88%-94. Lungs CTA. Triggered  Lactic protocol. Given 500cc NS bolus per ordered. Lactic level 2.7 at recheck. Jim CHESTER.

## 2018-10-20 NOTE — PLAN OF CARE
Problem: Patient Care Overview  Goal: Plan of Care/Patient Progress Review        Discharge Planner PT   Patient plan for discharge: TCU  Current status: Pt was cued for supine to sit, pt needing mod A for LEs. Pt was cued for sit to stand, FWW, unable to perform despite bed height. Pt cued for use of valerie steady, again despite elevating bed height and max A x 2, unable to get into full erect stand as painful in B knees. Will need to use lift. Max A x 2 for return to supine, dependent for scooting in bed using lift device.  RN working on getting pt recliner chair, PT did have W/C ready for Pt but declining politely until recliner chair is found.   Barriers to return to prior living situation: very limited mobility, O2 needs, stairs within home, unable to ambulate yet, caregiver for spouse  Recommendations for discharge: TCU  Rationale for recommendations: Currently functioning well below baseline, will need ongoing skilled PT intervention to improve independence and safety with functional mobility skills prior to returning home.  Unlikely to reach sufficient mobility goals during hospitalization, but will continue to reassess.        Entered by: Shyla Nichols 10/20/2018 12:54 PM

## 2018-10-20 NOTE — PROGRESS NOTES
Pt A&O, able to make needs known.  Up with mechanical lift.  Pain in paresh knees controlled with voltaren gel, prn tylenol and oxycodone.  Voiding adequate amounts into urinal at bedside.  CMS intact with baseline numbness and tingling.  Dressing to LLE CDI.  Tolerating mod carb diet.  Cultures pending on paresh knees.  Will continue to monitor, pt hopeful to discharge to [facility] on Sunday.

## 2018-10-20 NOTE — PROGRESS NOTES
Delmy RN [phone]  Pt A&O, VSS on 1.5L O2.  Pain controlled with prn tramadol and tylenol, R knee pain controlled with voltaren gel as well.  Dressing to L foot CDI.  Pt voiding in urinal at bedside, using bedpan for BM.  Up with heavy assist of 2 and pt declined to get out of bed.  IV saline locked, IV ancef ordered.  Tolerating mod carb diet, bg 118.  Plan is to discharge to [facility] Saturday.

## 2018-10-20 NOTE — CONSULTS
[facility]    Sepsis Evaluation Progress Note    Date of Service: 10/20/2018    I was called to see Lance Ibrahim due to abnormal vital signs triggering the Sepsis SIRS screening alert. He is known to have an infection.     Physical Exam    Vital Signs:  Temp: 97.7  F (36.5  C) Temp src: Oral BP: 167/75 Pulse: 109 Heart Rate: 104 Resp: 18 SpO2: 90 % O2 Device: Nasal cannula Oxygen Delivery: 2 LPM    Lab:  Lactic Acid   Date Value Ref Range Status   10/17/2018 0.6 (L) 0.7 - 2.0 mmol/L Final     Lactate for Sepsis Protocol   Date Value Ref Range Status   10/20/2018 2.1 (H) 0.7 - 2.0 mmol/L Final     Comment:     Called to  SRIKANTH KERRI ([id number]) ON 10.20.18 AT 0839 BY EK         The patient is at baseline mental status.    The rest of their physical exam is significant for cellulitis.  Status post amputation.  Continues to be on Ancef.  Slightly elevated leukocytosis.  Was also started on Lasix yesterday.  Cannot exclude infection.  Dehydration could also contribute to tachycardia and lactic acid levels.  Will reassess after fluid bolus    Assessment and Plan    The SIRS and exam findings are likely due to   sepsis.     ID: The patient is currently on the following antibiotics:  Anti-infectives (Future)    Start     Dose/Rate Route Frequency Ordered Stop    10/18/18 1430  ceFAZolin (ANCEF) intermittent infusion 2 g in 100 mL dextrose PRE-MIX      2 g  200 mL/hr over 30 Minutes Intravenous EVERY 8 HOURS 10/18/18 1400          Current antibiotic coverage is appropriate for source of infection.    Fluid: Fluid bolus ordered.    Lab: Repeat lactic acid ordered for 2 hours from now.    Disposition: The patient will remain on the current unit. We will continue to monitor this patient closely.    Charlotte Pugh MD

## 2018-10-20 NOTE — PROGRESS NOTES
[facility]  Infectious Disease Progress Note          Assessment and Plan:   IMPRESSION:   1.  73-year-old diabetic male with acute left leg and foot cellulitis, definite concern for third toe osteomyelitis with a grossly abnormal toe, some systemic toxicity with blood cultures pending.   2.  ANAPHYLACTIC PENICILLIN ALLERGY.   3.  Diabetes mellitus.   4.  Mild renal insufficiency, improved.   5.  Prior history of foot and leg cellulitis.   6 Hypoxia, some interstitial changes on imaging but doubt infection      RECOMMENDATIONS:   1. On ancef MSSA only in cx  2. Note operative findings, ., cultures MSSA, fever now resolving, po OK if disposition,   3 Doubt lungs are infection, triggered sepsis again but no fever  4 If disposition 2 weeks po keflex 500 qid            Interval History:   no new complaints and doing well; no cp, sob, n/v/d, or abd pain. Mild hypoxia, foot cx staph only sens pending, toe amp, fever down, biggest co chronic knee pain worse              Medications:       aspirin  325 mg Oral Daily     carvedilol  3.125 mg Oral BID w/meals     ceFAZolin  2 g Intravenous Q8H     diclofenac  2 g Transdermal 4x Daily     enoxaparin  40 mg Subcutaneous Q24H     finasteride  5 mg Oral Daily     gabapentin  100 mg Oral BID     gabapentin  200 mg Oral At Bedtime     hypromellose-dextran  1 drop Both Eyes QAM     insulin aspart  1-10 Units Subcutaneous TID AC     insulin aspart  1-7 Units Subcutaneous At Bedtime     lisinopril  20 mg Oral Daily     loratadine  10 mg Oral At Bedtime     metFORMIN  1,000 mg Oral BID w/meals     sodium chloride (PF)  3 mL Intracatheter Q8H     tamsulosin  0.4 mg Oral Daily                  Physical Exam:   Blood pressure 159/71, pulse 109, temperature 98.8  F (37.1  C), temperature source Oral, resp. rate 19, height 1.829 m (6'), weight 115.2 kg (254 lb), SpO2 90 %.  Wt Readings from Last 2 Encounters:   10/20/18 115.2 kg (254 lb)   08/27/18 118.3 kg (260 lb  14.4 oz)     Vital Signs with Ranges  Temp:  [96.1  F (35.6  C)-98.8  F (37.1  C)] 98.8  F (37.1  C)  Pulse:  [109] 109  Heart Rate:  [] 102  Resp:  [18-19] 19  BP: (130-167)/() 159/71  SpO2:  [89 %-93 %] 90 %    Constitutional: Awake, alert, cooperative, no apparent distress   Lungs: Clear to auscultation bilaterally, no crackles or wheezing   Cardiovascular: Regular rate and rhythm, normal S1 and S2, and no murmur noted   Abdomen: Normal bowel sounds, soft, non-distended, non-tender   Skin: No rashes, no cyanosis, some edema foot wrapped   Other:           Data:   All microbiology laboratory data reviewed.  Recent Labs   Lab Test  10/20/18   0614  10/18/18   0739  10/17/18   1855   WBC  12.6*  11.4*  11.3*   HGB  10.5*  10.4*  10.0*   HCT  33.6*  33.8*  32.4*   MCV  92  94  93   PLT  376  319  311     Recent Labs   Lab Test  10/20/18   0614  10/19/18   0638  10/18/18   0739   CR  0.98  1.03  1.09     No lab results found.  Recent Labs   Lab Test  10/17/18   1858  10/17/18   1854  10/15/18   1615  10/14/18   1455  10/14/18   1443  06/19/18   1459  06/19/18   1448  05/01/18   1610  05/01/18   1549   CULT  No growth after 3 days  No growth after 3 days  Moderate growth  Finegoldia magna (Peptostreptococcus parish)  Susceptibility testing not routinely done  *  Culture in progress  Moderate growth  Staphylococcus aureus  *  No growth  No growth  No growth  No growth  No growth  No growth  Light growth  Beta hemolytic Streptococcus group B  *

## 2018-10-20 NOTE — PROGRESS NOTES
[facility]    Hospitalist Progress Note  Name: Lance Ibrahim    MRN: [id number]  Provider: Charlotte Pugh MD  Date of Service: 10/20/2018    Assessment & Plan   Summary of Stay: Lance Ibrahim is a 73 year old male with past medical history significant for diabetes mellitus, recurrent lower extremity infection, recent hospitalization for osteomyelitis in June 2018 presented once again with left lower extremity infection and was admitted on 10/14/2018.     Presented with fever and chills.  ER he admitted to have sepsis with elevated lactic acid, leukocytosis tachycardia.  He was resuscitated and received vancomycin and clindamycin.  Patient was seen by podiatry and underwent debridement on 10/16/2018 along with left third toe amputation.  Postoperatively developed shortness of breath with significant hypoxia. Blood cultures grew MSSA positive.  ID was consulted and was treated with Ancef with plans to be discharged on Keflex.    Patient has severely limited physical activity.  Is unable to ambulate complains of significant pain in his knee as well.  There are plans to discharge him to transitional care unit.    10/20/2018 once again patient's lactic acid levels were elevated.  He triggered sepsis protocol due to his vitals.      Right third toe infection status post amputation  --Postop day #3    Early sepsis on admission  --On admission patient with tachycardia, leukocytosis, borderline lactic acid levels with acute renal failure  --Triggered sepsis protocol once again 10/20/2018  --We will check daily weights hold Lasix and give IV fluids  --Repeat lactic acid level  --Continue on Ancef  --Reculture if spikes temperature    Acute renal failure  --Resolved with hydration    Diabetes mellitus type 2  --On metformin at baseline thousand milligrams twice daily    Hypertension  --On lisinopril    Lactic acidosis  --Multifactorial could be due to infection cannot exclude secondary to dehydration from  diuretics and use of metformin  --IV fluid  --Hold Lasix  --Daily weights  --We will consider switching to metformin if lactic acid levels do not improve    Right knee osteoarthritis  --Followed by also Dr. Cunha  --Orthopedic consult      DVT Prophylaxis: Pneumatic Compression Devices  Code Status: Full Code    Disposition: Expected discharge in 1-2 days to home      Interval History   Assumed care reviewed chart.  Complains of severe knee pain right more than left.  Lactic acid levels were elevated today.   denies chest pain or shortness of breath no lightheadedness no dizziness.  Review of all the other systems negative    -Data reviewed today: I reviewed all new labs and imaging reports over the last 24 hours. I personally reviewed no images or EKG's today.    Physical Exam   Temp: 97.7  F (36.5  C) Temp src: Oral BP: 167/75 Pulse: 109 Heart Rate: 104 Resp: 18 SpO2: 90 % O2 Device: Nasal cannula Oxygen Delivery: 2 LPM  Vitals:    10/14/18 1451 10/14/18 1457   Weight: 116.6 kg (257 lb) 116.6 kg (257 lb)     Vital Signs with Ranges  Temp:  [96.1  F (35.6  C)-98.6  F (37  C)] 97.7  F (36.5  C)  Pulse:  [109] 109  Heart Rate:  [] 104  Resp:  [18] 18  BP: (130-167)/() 167/75  SpO2:  [89 %-96 %] 90 %  I/O last 3 completed shifts:  In: -   Out: 1160 [Urine:1160]      GEN:  Alert, oriented x 3, appears comfortable, NAD.  HEENT:  Normocephalic/atraumatic, no scleral icterus, no nasal discharge, mouth moist.  CV:  Regular rate and rhythm, no murmur or JVD.  S1 + S2 noted, no S3 or S4.  LUNGS:  Clear to auscultation bilaterally without rales/rhonchi/wheezing/retractions.  Symmetric chest rise on inhalation noted.  ABD:  Active bowel sounds, soft, non-tender/non-distended.  No rebound/guarding/rigidity.  EXT:  No edema.  No cyanosis.  Right knee swelling.  SKIN:  Dry to touch,    Medications       aspirin  325 mg Oral Daily     carvedilol  3.125 mg Oral BID w/meals     ceFAZolin  2 g Intravenous Q8H      diclofenac  2 g Transdermal 4x Daily     enoxaparin  40 mg Subcutaneous Q24H     finasteride  5 mg Oral Daily     furosemide  40 mg Oral Daily     gabapentin  100 mg Oral BID     gabapentin  200 mg Oral At Bedtime     hypromellose-dextran  1 drop Both Eyes QAM     insulin aspart  1-10 Units Subcutaneous TID AC     insulin aspart  1-7 Units Subcutaneous At Bedtime     lisinopril  20 mg Oral Daily     loratadine  10 mg Oral At Bedtime     metFORMIN  1,000 mg Oral BID w/meals     sodium chloride (PF)  3 mL Intracatheter Q8H     tamsulosin  0.4 mg Oral Daily     Data       Recent Labs  Lab 10/17/18  1950   PH 7.48*   PO2 44*   PCO2 35   HCO3 26       Recent Labs  Lab 10/20/18  0614 10/18/18  0739 10/17/18  1855   WBC 12.6* 11.4* 11.3*   HGB 10.5* 10.4* 10.0*   HCT 33.6* 33.8* 32.4*   MCV 92 94 93    319 311       Recent Labs  Lab 10/20/18  0614 10/19/18  0638 10/18/18  0739    133 137   POTASSIUM 4.5 4.3 3.8   CHLORIDE 98 99 101   CO2 28 26 28   ANIONGAP 7 8 8   * 136* 141*   BUN 20 16 15   CR 0.98 1.03 1.09   GFRESTIMATED 75 71 66   GFRESTBLACK >90 85 80   SHANNAN 8.8 8.8 8.3*       Recent Labs  Lab 10/17/18  1858 10/17/18  1854 10/15/18  1615 10/14/18  1455 10/14/18  1443   CULT No growth after 3 days No growth after 3 days Moderate growthFinegoldia magna (Peptostreptococcus parish)Susceptibility testing not routinely done*  Culture in progress  Moderate growthStaphylococcus aureus* No growth No growth     No results for input(s): AST, ALT, GGT, ALKPHOS, BILITOTAL, BILICONJ, BILIDIRECT, AYLA in the last 168 hours.    Invalid input(s): BILIRUBININDIRECT  No results for input(s): INR in the last 168 hours.    Recent Labs  Lab 10/17/18  0022 10/14/18  1443   LACT 0.6* 2.0     No results for input(s): TSH in the last 168 hours.    Recent Labs  Lab 10/18/18  0739   TROPI 0.017     No results for input(s): COLOR, APPEARANCE, URINEGLC, URINEBILI, URINEKETONE, SG, UBLD, URINEPH, PROTEIN, UROBILINOGEN,  NITRITE, LEUKEST, RBCU, WBCU in the last 168 hours.    No results found for this or any previous visit (from the past 24 hour(s)).

## 2018-10-21 LAB
ANION GAP SERPL CALCULATED.3IONS-SCNC: 8 MMOL/L (ref 3–14)
BASOPHILS # BLD AUTO: 0 10E9/L (ref 0–0.2)
BASOPHILS NFR BLD AUTO: 0 %
BUN SERPL-MCNC: 22 MG/DL (ref 7–30)
CALCIUM SERPL-MCNC: 8.9 MG/DL (ref 8.5–10.1)
CHLORIDE SERPL-SCNC: 97 MMOL/L (ref 94–109)
CO2 SERPL-SCNC: 29 MMOL/L (ref 20–32)
CREAT SERPL-MCNC: 1.11 MG/DL (ref 0.66–1.25)
DIFFERENTIAL METHOD BLD: ABNORMAL
EOSINOPHIL # BLD AUTO: 0.1 10E9/L (ref 0–0.7)
EOSINOPHIL NFR BLD AUTO: 1 %
ERYTHROCYTE [DISTWIDTH] IN BLOOD BY AUTOMATED COUNT: 15.8 % (ref 10–15)
GFR SERPL CREATININE-BSD FRML MDRD: 65 ML/MIN/1.7M2
GLUCOSE BLDC GLUCOMTR-MCNC: 104 MG/DL (ref 70–99)
GLUCOSE BLDC GLUCOMTR-MCNC: 125 MG/DL (ref 70–99)
GLUCOSE BLDC GLUCOMTR-MCNC: 144 MG/DL (ref 70–99)
GLUCOSE BLDC GLUCOMTR-MCNC: 158 MG/DL (ref 70–99)
GLUCOSE BLDC GLUCOMTR-MCNC: 273 MG/DL (ref 70–99)
GLUCOSE SERPL-MCNC: 154 MG/DL (ref 70–99)
HCT VFR BLD AUTO: 33.7 % (ref 40–53)
HGB BLD-MCNC: 10.5 G/DL (ref 13.3–17.7)
LACTATE BLD-SCNC: 2.1 MMOL/L (ref 0.7–2)
LYMPHOCYTES # BLD AUTO: 2.2 10E9/L (ref 0.8–5.3)
LYMPHOCYTES NFR BLD AUTO: 17 %
MCH RBC QN AUTO: 28.4 PG (ref 26.5–33)
MCHC RBC AUTO-ENTMCNC: 31.2 G/DL (ref 31.5–36.5)
MCV RBC AUTO: 91 FL (ref 78–100)
METAMYELOCYTES # BLD: 0.9 10E9/L
METAMYELOCYTES NFR BLD MANUAL: 7 %
MONOCYTES # BLD AUTO: 1.1 10E9/L (ref 0–1.3)
MONOCYTES NFR BLD AUTO: 8 %
NEUTROPHILS # BLD AUTO: 8.8 10E9/L (ref 1.6–8.3)
NEUTROPHILS NFR BLD AUTO: 67 %
NRBC # BLD AUTO: 0.1 10*3/UL
NRBC BLD AUTO-RTO: 1 /100
PLATELET # BLD AUTO: 418 10E9/L (ref 150–450)
PLATELET # BLD EST: ABNORMAL 10*3/UL
POTASSIUM SERPL-SCNC: 4.7 MMOL/L (ref 3.4–5.3)
RBC # BLD AUTO: 3.7 10E12/L (ref 4.4–5.9)
RBC MORPH BLD: ABNORMAL
SODIUM SERPL-SCNC: 134 MMOL/L (ref 133–144)
WBC # BLD AUTO: 13.2 10E9/L (ref 4–11)

## 2018-10-21 PROCEDURE — 25000132 ZZH RX MED GY IP 250 OP 250 PS 637: Performed by: INTERNAL MEDICINE

## 2018-10-21 PROCEDURE — 99232 SBSQ HOSP IP/OBS MODERATE 35: CPT | Performed by: INTERNAL MEDICINE

## 2018-10-21 PROCEDURE — 25000125 ZZHC RX 250: Performed by: INTERNAL MEDICINE

## 2018-10-21 PROCEDURE — 25000128 H RX IP 250 OP 636: Performed by: INTERNAL MEDICINE

## 2018-10-21 PROCEDURE — 83605 ASSAY OF LACTIC ACID: CPT | Performed by: INTERNAL MEDICINE

## 2018-10-21 PROCEDURE — 00000146 ZZHCL STATISTIC GLUCOSE BY METER IP

## 2018-10-21 PROCEDURE — 85025 COMPLETE CBC W/AUTO DIFF WBC: CPT | Performed by: INTERNAL MEDICINE

## 2018-10-21 PROCEDURE — 36415 COLL VENOUS BLD VENIPUNCTURE: CPT | Performed by: INTERNAL MEDICINE

## 2018-10-21 PROCEDURE — 80048 BASIC METABOLIC PNL TOTAL CA: CPT | Performed by: INTERNAL MEDICINE

## 2018-10-21 PROCEDURE — 12000007 ZZH R&B INTERMEDIATE

## 2018-10-21 PROCEDURE — 99207 ZZC CDG-MDM COMPONENT: MEETS LOW - DOWN CODED: CPT | Performed by: INTERNAL MEDICINE

## 2018-10-21 RX ORDER — MINOCYCLINE HYDROCHLORIDE 100 MG/1
100 CAPSULE ORAL EVERY 12 HOURS SCHEDULED
Status: DISCONTINUED | OUTPATIENT
Start: 2018-10-21 | End: 2018-10-22 | Stop reason: HOSPADM

## 2018-10-21 RX ORDER — DIPHENHYDRAMINE HCL 25 MG
25 CAPSULE ORAL EVERY 6 HOURS PRN
Status: DISCONTINUED | OUTPATIENT
Start: 2018-10-21 | End: 2018-10-22 | Stop reason: HOSPADM

## 2018-10-21 RX ORDER — PREDNISONE 20 MG/1
40 TABLET ORAL DAILY
Status: DISCONTINUED | OUTPATIENT
Start: 2018-10-21 | End: 2018-10-22 | Stop reason: HOSPADM

## 2018-10-21 RX ADMIN — CEFAZOLIN SODIUM 2 G: 2 INJECTION, SOLUTION INTRAVENOUS at 04:07

## 2018-10-21 RX ADMIN — CARVEDILOL 3.12 MG: 3.12 TABLET, FILM COATED ORAL at 08:47

## 2018-10-21 RX ADMIN — ASPIRIN 325 MG ORAL TABLET 325 MG: 325 PILL ORAL at 08:47

## 2018-10-21 RX ADMIN — DICLOFENAC 2 G: 10 GEL TOPICAL at 19:59

## 2018-10-21 RX ADMIN — GABAPENTIN 200 MG: 100 CAPSULE ORAL at 19:58

## 2018-10-21 RX ADMIN — Medication 2.5 MG: at 22:19

## 2018-10-21 RX ADMIN — ACETAMINOPHEN 650 MG: 325 TABLET, FILM COATED ORAL at 04:13

## 2018-10-21 RX ADMIN — MINOCYCLINE HYDROCHLORIDE 100 MG: 100 CAPSULE ORAL at 11:20

## 2018-10-21 RX ADMIN — LORATADINE 10 MG: 10 TABLET ORAL at 19:59

## 2018-10-21 RX ADMIN — METFORMIN HYDROCHLORIDE 1000 MG: 500 TABLET ORAL at 17:03

## 2018-10-21 RX ADMIN — CARVEDILOL 3.12 MG: 3.12 TABLET, FILM COATED ORAL at 17:03

## 2018-10-21 RX ADMIN — DICLOFENAC 2 G: 10 GEL TOPICAL at 12:34

## 2018-10-21 RX ADMIN — ACETAMINOPHEN 650 MG: 325 TABLET, FILM COATED ORAL at 22:19

## 2018-10-21 RX ADMIN — GABAPENTIN 100 MG: 100 CAPSULE ORAL at 08:47

## 2018-10-21 RX ADMIN — GABAPENTIN 100 MG: 100 CAPSULE ORAL at 12:34

## 2018-10-21 RX ADMIN — LISINOPRIL 20 MG: 20 TABLET ORAL at 08:47

## 2018-10-21 RX ADMIN — METFORMIN HYDROCHLORIDE 1000 MG: 500 TABLET ORAL at 08:46

## 2018-10-21 RX ADMIN — DICLOFENAC 2 G: 10 GEL TOPICAL at 17:03

## 2018-10-21 RX ADMIN — Medication 2.5 MG: at 18:26

## 2018-10-21 RX ADMIN — Medication 2.5 MG: at 04:13

## 2018-10-21 RX ADMIN — TAMSULOSIN HYDROCHLORIDE 0.4 MG: 0.4 CAPSULE ORAL at 08:47

## 2018-10-21 RX ADMIN — Medication 2.5 MG: at 08:56

## 2018-10-21 RX ADMIN — MINOCYCLINE HYDROCHLORIDE 100 MG: 100 CAPSULE ORAL at 19:58

## 2018-10-21 RX ADMIN — DICLOFENAC 2 G: 10 GEL TOPICAL at 08:49

## 2018-10-21 RX ADMIN — DIPHENHYDRAMINE HYDROCHLORIDE 25 MG: 25 CAPSULE ORAL at 22:19

## 2018-10-21 RX ADMIN — FINASTERIDE 5 MG: 5 TABLET, FILM COATED ORAL at 09:10

## 2018-10-21 RX ADMIN — ENOXAPARIN SODIUM 40 MG: 40 INJECTION SUBCUTANEOUS at 19:59

## 2018-10-21 RX ADMIN — PREDNISONE 40 MG: 20 TABLET ORAL at 14:17

## 2018-10-21 RX ADMIN — ACETAMINOPHEN 650 MG: 325 TABLET, FILM COATED ORAL at 18:26

## 2018-10-21 RX ADMIN — Medication 2.5 MG: at 13:43

## 2018-10-21 RX ADMIN — DIPHENHYDRAMINE HYDROCHLORIDE 25 MG: 25 CAPSULE ORAL at 14:17

## 2018-10-21 RX ADMIN — ACETAMINOPHEN 650 MG: 325 TABLET, FILM COATED ORAL at 12:33

## 2018-10-21 RX ADMIN — INSULIN ASPART 1 UNITS: 100 INJECTION, SOLUTION INTRAVENOUS; SUBCUTANEOUS at 12:34

## 2018-10-21 RX ADMIN — INSULIN ASPART 1 UNITS: 100 INJECTION, SOLUTION INTRAVENOUS; SUBCUTANEOUS at 08:47

## 2018-10-21 ASSESSMENT — ACTIVITIES OF DAILY LIVING (ADL)
ADLS_ACUITY_SCORE: 13
ADLS_ACUITY_SCORE: 14

## 2018-10-21 NOTE — PLAN OF CARE
Problem: Patient Care Overview  Goal: Plan of Care/Patient Progress Review  Pt refusing PT until his knees are feeling better, noted per chart and talked with nsg re: gout dx

## 2018-10-21 NOTE — PROGRESS NOTES
SW called [facility] to let them know patient would not be discharging today.     Brittany Evans, KATHLEEN  [phone]

## 2018-10-21 NOTE — PLAN OF CARE
Problem: Patient Care Overview  Goal: Plan of Care/Patient Progress Review  Outcome: Improving  A&O. LS clear. Dressing LATISHA. Max temp 100.7. IS performed. Using mechanical lift. Voiding using urinal. Pain is managed with oral medications. On IV ancef. Blood glucose monitored.

## 2018-10-21 NOTE — PROCEDURES
Patient underwent bilateral aspirations of his knees due to acute on chronic pain. Given his recent infection history, there was concern for septic knees. Both knees were prepped and draped in sterile fashion and using sterile techniques approximately 30 ml of bloody synovial fluid was aspirated from the R knee and 30cc of straw colored fluid from the left knee. Patient tolerated the procedure well and a bandaid was placed. The samples were sent to the lab for gram stain, cultures, cell count, and crystals.

## 2018-10-21 NOTE — PROGRESS NOTES
Orthopedic Surgery  Lance Ibrahim  10/21/2018  Admit Date:  10/14/2018  S/P joint aspiration to bilateral knees (10/20/2018)     Patient states his bilateral knee pain is improving and doesn't feel he is as tender. He complains of continued inability to walk and voices his frustration of inability to walk x8 days. Tolerating oral intake.  No events overnight. He denies a history of gout. He states steroid injections to his knees have been very successful in the past and is requesting injections today.     Alert and orient to person, place, and time.  Vital Sign Ranges  Temperature Temp  Av.9  F (37.2  C)  Min: 97.1  F (36.2  C)  Max: 100.7  F (38.2  C)   Blood pressure Systolic (24hrs), Av , Min:142 , Max:165        Diastolic (24hrs), Av, Min:68, Max:75      Pulse No Data Recorded   Respirations Resp  Av.8  Min: 17  Max: 18   Pulse oximetry SpO2  Av.5 %  Min: 88 %  Max: 91 %       Patient laying comfortably in bed   No ecchymosis or erythema over bilateral lower extremities   Mild edema to bilateral knees   No TTP of right knee  TTP of superior pole of left knee   Limited ROM secondary to pain   Bilateral calves are soft, non-tender.  Bilateral lower extremity is NVI.  Sensation intact bilateral lower extremities  5/5 motor with resisted dorsi and plantar flexion bilaterally  5/5 EHL  +Dp pulse     Labs:  Recent Labs   Lab Test  10/21/18   0619  10/20/18   0614  10/19/18   0638   POTASSIUM  4.7  4.5  4.3     Recent Labs   Lab Test  10/21/18   0619  10/20/18   0614  10/18/18   0739   HGB  10.5*  10.5*  10.4*     Recent Labs   Lab Test  12   0804   INR  1.05     Recent Labs   Lab Test  10/21/18   0619  10/20/18   0614  10/18/18   0739   PLT  418  376  319       A/P  1. Plan   Aspiration of bilateral knees - positive for crystals. TX for gout per medicine team.    Continue to follow cultures; negative gram stain.    Discussion had with patient in regards to deferring cortisone injections at  this juncture until we are certain of no joint infection/improvement in ambulation s/p acute gout treatment. Patient voiced understanding and agreement with the plan.    Continue DVT prophylaxis - SCDs & Lovenox    Mobilize with PT/OT and WBAT.    Continue current pain regiment.    2. Disposition   Anticipate d/c to home pending progress.      MISA PangC

## 2018-10-21 NOTE — PROGRESS NOTES
[facility]  Infectious Disease Progress Note          Assessment and Plan:   IMPRESSION:   1.  73-year-old diabetic male with acute left leg and foot cellulitis, definite concern for third toe osteomyelitis with a grossly abnormal toe, some systemic toxicity with blood cultures pending.   2.  ANAPHYLACTIC PENICILLIN ALLERGY.   3.  Diabetes mellitus.   4.  Mild renal insufficiency, improved.   5.  Prior history of foot and leg cellulitis.   6 Hypoxia, some interstitial changes on imaging but doubt infection  7 Bilat knee pain, both knees + inflamm and crystals  8 New rash, ? ancef      RECOMMENDATIONS:   1. DC ancef and watch rash,  po ready any way  2. Note operative findings, ., cultures MSSA,, po OK if disposition,   3 Doubt lungs are infection,  4 Knee taps crystal disease, likely explains fever, tx gout/pseudogout  4 If disposition 10 days po minocin            Interval History:   no new complaints and knee pain biggest co no cp, sob, n/v/d, or abd pain. Mild hypoxia, foot cx staph only sens pending, toe amp, fever back to 100.7, biggest co chronic knee pain worse and tap as noted pepto strep in foot cx, knee cxs neg              Medications:       aspirin  325 mg Oral Daily     carvedilol  3.125 mg Oral BID w/meals     ceFAZolin  2 g Intravenous Q8H     diclofenac  2 g Transdermal 4x Daily     enoxaparin  40 mg Subcutaneous Q24H     finasteride  5 mg Oral Daily     gabapentin  100 mg Oral BID     gabapentin  200 mg Oral At Bedtime     hypromellose-dextran  1 drop Both Eyes QAM     insulin aspart  1-10 Units Subcutaneous TID AC     insulin aspart  1-7 Units Subcutaneous At Bedtime     lisinopril  20 mg Oral Daily     loratadine  10 mg Oral At Bedtime     metFORMIN  1,000 mg Oral BID w/meals     sodium chloride (PF)  3 mL Intracatheter Q8H     tamsulosin  0.4 mg Oral Daily                  Physical Exam:   Blood pressure 160/74, pulse 109, temperature 97.6  F (36.4  C), temperature source  Oral, resp. rate 18, height 1.829 m (6'), weight 118.2 kg (260 lb 9.6 oz), SpO2 (!) 88 %.  Wt Readings from Last 2 Encounters:   10/21/18 118.2 kg (260 lb 9.6 oz)   08/27/18 118.3 kg (260 lb 14.4 oz)     Vital Signs with Ranges  Temp:  [97.1  F (36.2  C)-100.7  F (38.2  C)] 97.6  F (36.4  C)  Heart Rate:  [91-98] 94  Resp:  [17-18] 18  BP: (142-165)/(68-75) 160/74  SpO2:  [88 %-91 %] 88 %    Constitutional: Awake, alert, cooperative, no apparent distress   Lungs: Clear to auscultation bilaterally, no crackles or wheezing   Cardiovascular: Regular rate and rhythm, normal S1 and S2, and no murmur noted   Abdomen: Normal bowel sounds, soft, non-distended, non-tender   Skin: New rash arms and back ? Early drug rash, no cyanosis, some edema foot wrapped knees about same   Other:           Data:   All microbiology laboratory data reviewed.  Recent Labs   Lab Test  10/21/18   0619  10/20/18   0614  10/18/18   0739   WBC  13.2*  12.6*  11.4*   HGB  10.5*  10.5*  10.4*   HCT  33.7*  33.6*  33.8*   MCV  91  92  94   PLT  418  376  319     Recent Labs   Lab Test  10/21/18   0619  10/20/18   0614  10/19/18   0638   CR  1.11  0.98  1.03     No lab results found.  Recent Labs   Lab Test  10/20/18   1401  10/20/18   1400  10/17/18   1858  10/17/18   1854  10/15/18   1615  10/14/18   1455  10/14/18   1443  06/19/18   1459  06/19/18   1448   CULT  PENDING  PENDING  No growth after 3 days  No growth after 3 days  Moderate growth  Finegoldia magna (Peptostreptococcus parish)  Susceptibility testing not routinely done  *  Culture in progress  Moderate growth  Staphylococcus aureus  *  No growth  No growth  No growth  No growth

## 2018-10-21 NOTE — PROGRESS NOTES
[facility]    Hospitalist Progress Note  Name: Lance Ibrahim    MRN: [id number]  Provider: Charlotte Pugh MD  Date of Service: 10/21/2018    Assessment & Plan   Summary of Stay: Lance Ibrahim is a 73 year old male with past medical history significant for diabetes mellitus, recurrent lower extremity infection, recent hospitalization for osteomyelitis in June 2018 presented once again with left lower extremity infection and was admitted on 10/14/2018.     Presented with fever and chills.  ER he admitted to have sepsis with elevated lactic acid, leukocytosis tachycardia.  He was resuscitated and received vancomycin and clindamycin.  Patient was seen by podiatry and underwent debridement on 10/16/2018 along with left third toe amputation.  Postoperatively developed shortness of breath with significant hypoxia. Blood cultures grew MSSA positive.  ID was consulted and was treated with Ancef with plans to be discharged on Keflex.    Patient has severely limited physical activity.  Is unable to ambulate complains of significant pain in his knee as well.  There are plans to discharge him to transitional care unit.    10/20/2018 once again patient's lactic acid levels were elevated.  He triggered sepsis protocol due to his vitals. Wbcs trending up, was febrile overnight temperature 100.7    Patient was diagnosed with gout positive crystals on joint effusion.  Also patient developed significant urticaria concerning for allergic drug reaction.      Urticaria:  --With history of allergies to penicillin suspect secondary to use of cephalosporins  --Discontinue cephalosporins  --Started on minocyclin per ID  --Benadryl and steroids    Gout  --Added on prednisone which will help treat both urticaria and gout  --Monitor blood glucose closely while on prednisone    Right third toe infection status post amputation  --Postop day #4      Early sepsis on admission  --On admission patient with tachycardia, leukocytosis,  borderline lactic acid levels with acute renal failure  --Triggered sepsis protocol once again 10/20/2018  --We will check daily weights hold Lasix and give IV fluids  --Repeat lactic acid level  --Patient developed rash on Ancef.  Will discontinue cephalosporins.  Discussed with infectious disease patient was started on Minocyclin  --Reculture if spikes temperature  --T-max last 24 hours was 100.7, patient has increasing leukocytosis, lactic acidosis despite being on antibiotic.  Will discuss with infectious disease.  Continue to monitor now on minocycline    Acute renal failure  --Resolved with hydration    Diabetes mellitus type 2  --On metformin at baseline thousand milligrams twice daily    Hypertension  --On lisinopril    Lactic acidosis  --Multifactorial could be due to infection cannot exclude secondary to dehydration from diuretics and use of metformin  --IV fluid  --Hold Lasix  --Daily weights  --We will consider switching to metformin if lactic acid levels do not improve    Bilateral knee pain and swelling right more than left   --Appreciate Orthopedic consult  --Status post aspiration of both knees  --Right knee aspirate with more than 28,000 WBCs.  Gram stain negative for bacteria cultures pending      DVT Prophylaxis: Pneumatic Compression Devices  Code Status: Full Code    Disposition: Expected discharge 1-2 days to home      Interval History   Patient had a temperature spike of 100.7, WBCs trending up, status post aspiration of both knees on 10/19/2018 positive for gout.  Patient also has new rash behind both his arms and on back.  Rash clinically appears to be urticaria drug related.  Denies chest pain or shortness of breath no lightheadedness no dizziness.  Review of all the other systems negative    -Data reviewed today: I reviewed all new labs and imaging reports over the last 24 hours. I personally reviewed no images or EKG's today.    Physical Exam   Temp: 97.6  F (36.4  C) Temp src: Oral BP:  160/74   Heart Rate: 94 Resp: 18 SpO2: (!) 88 % O2 Device: None (Room air) Oxygen Delivery: 2 LPM  Vitals:    10/14/18 1457 10/20/18 0934 10/21/18 0407   Weight: 116.6 kg (257 lb) 115.2 kg (254 lb) 118.2 kg (260 lb 9.6 oz)     Vital Signs with Ranges  Temp:  [97.1  F (36.2  C)-100.7  F (38.2  C)] 97.6  F (36.4  C)  Heart Rate:  [] 94  Resp:  [17-19] 18  BP: (142-165)/(68-75) 160/74  SpO2:  [88 %-91 %] 88 %  I/O last 3 completed shifts:  In: 320 [P.O.:320]  Out: 1950 [Urine:1950]      GEN:  Alert, oriented x 3, appears comfortable, NAD.  HEENT:  Normocephalic/atraumatic, no scleral icterus, no nasal discharge, mouth moist.  CV:  Regular rate and rhythm, no murmur or JVD.  S1 + S2 noted, no S3 or S4.  LUNGS:  Clear to auscultation bilaterally without rales/rhonchi/wheezing/retractions.  Symmetric chest rise on inhalation noted.  ABD:  Active bowel sounds, soft, non-tender/non-distended.  No rebound/guarding/rigidity.  EXT:  No edema.  No cyanosis.  Right knee swelling.  SKIN:  Dry to touch, urticaria involving back of arms and back    Medications       aspirin  325 mg Oral Daily     carvedilol  3.125 mg Oral BID w/meals     ceFAZolin  2 g Intravenous Q8H     diclofenac  2 g Transdermal 4x Daily     enoxaparin  40 mg Subcutaneous Q24H     finasteride  5 mg Oral Daily     gabapentin  100 mg Oral BID     gabapentin  200 mg Oral At Bedtime     hypromellose-dextran  1 drop Both Eyes QAM     insulin aspart  1-10 Units Subcutaneous TID AC     insulin aspart  1-7 Units Subcutaneous At Bedtime     lisinopril  20 mg Oral Daily     loratadine  10 mg Oral At Bedtime     metFORMIN  1,000 mg Oral BID w/meals     sodium chloride (PF)  3 mL Intracatheter Q8H     tamsulosin  0.4 mg Oral Daily     Data       Recent Labs  Lab 10/17/18  1950   PH 7.48*   PO2 44*   PCO2 35   HCO3 26       Recent Labs  Lab 10/21/18  0619 10/20/18  0614 10/18/18  0739   WBC 13.2* 12.6* 11.4*   HGB 10.5* 10.5* 10.4*   HCT 33.7* 33.6* 33.8*   MCV 91 92  94    376 319       Recent Labs  Lab 10/21/18  0619 10/20/18  0614 10/19/18  0638    133 133   POTASSIUM 4.7 4.5 4.3   CHLORIDE 97 98 99   CO2 29 28 26   ANIONGAP 8 7 8   * 145* 136*   BUN 22 20 16   CR 1.11 0.98 1.03   GFRESTIMATED 65 75 71   GFRESTBLACK 78 >90 85   SHANNAN 8.9 8.8 8.8       Recent Labs  Lab 10/20/18  1401 10/20/18  1400 10/17/18  1858 10/17/18  1854 10/15/18  1615 10/14/18  1455 10/14/18  1443   CULT PENDING PENDING No growth after 3 days No growth after 3 days Moderate growthFinegoldia magna (Peptostreptococcus parish)Susceptibility testing not routinely done*  Culture in progress  Moderate growthStaphylococcus aureus* No growth No growth     No results for input(s): AST, ALT, GGT, ALKPHOS, BILITOTAL, BILICONJ, BILIDIRECT, AYLA in the last 168 hours.    Invalid input(s): BILIRUBININDIRECT  No results for input(s): INR in the last 168 hours.    Recent Labs  Lab 10/20/18  1405 10/17/18  0022 10/14/18  1443   LACT 2.7* 0.6* 2.0     No results for input(s): TSH in the last 168 hours.    Recent Labs  Lab 10/18/18  0739   TROPI 0.017     No results for input(s): COLOR, APPEARANCE, URINEGLC, URINEBILI, URINEKETONE, SG, UBLD, URINEPH, PROTEIN, UROBILINOGEN, NITRITE, LEUKEST, RBCU, WBCU in the last 168 hours.    Recent Results (from the past 24 hour(s))   XR Knee Port Left 3 Views    Narrative    LEFT KNEE THREE VIEWS   10/20/2018 3:25 PM     HISTORY: Bilateral knee pain.     COMPARISON: None.      Impression    IMPRESSION: Moderate osteoarthritis lateral knee joint compartment and  patellofemoral joint especially laterally. Effusion in the  suprapatellar bursa.    NOLVIA LICONA MD   XR Knee Port Right 3 Views    Narrative    RIGHT KNEE THREE VIEWS   10/20/2018 3:25 PM     HISTORY: Knee pain.     COMPARISON: None.      Impression    IMPRESSION: Mild osteoarthritis lateral knee joint compartment. Severe  osteoarthritis of the patellofemoral joint especially laterally.  Effusion in the  suprapatellar bursa.    NOLVIA LICONA MD

## 2018-10-21 NOTE — PLAN OF CARE
Problem: Patient Care Overview  Goal: Plan of Care/Patient Progress Review  Outcome: Improving  Pt A&Ox4. Mechanical lift  transfert.  Refused PT this morning due to bilateral knee pain. Did sat at edge of bed for lunch. Started prednisone for tx gout. Reporting 7/10 bilateral knee pain. Given PRN tylenol and oxycodone with mild relief.,159.  Given sliding scale insulin. Voiding well.  BM x2.VSS. ON 1LL/min oxygen. Pt noted to have rash to hands,legs, stomach and back. Given benadryl. MD aware.

## 2018-10-22 ENCOUNTER — TELEPHONE (OUTPATIENT)
Dept: PODIATRY | Facility: CLINIC | Age: 73
End: 2018-10-22

## 2018-10-22 ENCOUNTER — APPOINTMENT (OUTPATIENT)
Dept: PHYSICAL THERAPY | Facility: CLINIC | Age: 73
DRG: 853 | End: 2018-10-22
Payer: COMMERCIAL

## 2018-10-22 VITALS
RESPIRATION RATE: 16 BRPM | HEIGHT: 72 IN | HEART RATE: 109 BPM | OXYGEN SATURATION: 95 % | SYSTOLIC BLOOD PRESSURE: 126 MMHG | BODY MASS INDEX: 33.86 KG/M2 | DIASTOLIC BLOOD PRESSURE: 71 MMHG | WEIGHT: 250 LBS | TEMPERATURE: 95.3 F

## 2018-10-22 LAB
ANION GAP SERPL CALCULATED.3IONS-SCNC: 7 MMOL/L (ref 3–14)
BACTERIA SPEC CULT: ABNORMAL
BASOPHILS # BLD AUTO: 0 10E9/L (ref 0–0.2)
BASOPHILS NFR BLD AUTO: 0 %
BUN SERPL-MCNC: 30 MG/DL (ref 7–30)
CALCIUM SERPL-MCNC: 9.4 MG/DL (ref 8.5–10.1)
CHLORIDE SERPL-SCNC: 99 MMOL/L (ref 94–109)
CO2 SERPL-SCNC: 27 MMOL/L (ref 20–32)
CREAT SERPL-MCNC: 1.01 MG/DL (ref 0.66–1.25)
DIFFERENTIAL METHOD BLD: ABNORMAL
EOSINOPHIL # BLD AUTO: 0 10E9/L (ref 0–0.7)
EOSINOPHIL NFR BLD AUTO: 0 %
ERYTHROCYTE [DISTWIDTH] IN BLOOD BY AUTOMATED COUNT: 15.7 % (ref 10–15)
GFR SERPL CREATININE-BSD FRML MDRD: 72 ML/MIN/1.7M2
GLUCOSE BLDC GLUCOMTR-MCNC: 130 MG/DL (ref 70–99)
GLUCOSE BLDC GLUCOMTR-MCNC: 153 MG/DL (ref 70–99)
GLUCOSE BLDC GLUCOMTR-MCNC: 190 MG/DL (ref 70–99)
GLUCOSE SERPL-MCNC: 166 MG/DL (ref 70–99)
HCT VFR BLD AUTO: 36 % (ref 40–53)
HGB BLD-MCNC: 11.1 G/DL (ref 13.3–17.7)
LYMPHOCYTES # BLD AUTO: 1.7 10E9/L (ref 0.8–5.3)
LYMPHOCYTES NFR BLD AUTO: 12 %
Lab: ABNORMAL
MCH RBC QN AUTO: 28.3 PG (ref 26.5–33)
MCHC RBC AUTO-ENTMCNC: 30.8 G/DL (ref 31.5–36.5)
MCV RBC AUTO: 92 FL (ref 78–100)
METAMYELOCYTES # BLD: 0.1 10E9/L
METAMYELOCYTES NFR BLD MANUAL: 1 %
MONOCYTES # BLD AUTO: 0.7 10E9/L (ref 0–1.3)
MONOCYTES NFR BLD AUTO: 5 %
MYELOCYTES # BLD: 0.1 10E9/L
MYELOCYTES NFR BLD MANUAL: 1 %
NEUTROPHILS # BLD AUTO: 11.7 10E9/L (ref 1.6–8.3)
NEUTROPHILS NFR BLD AUTO: 81 %
PLATELET # BLD AUTO: 488 10E9/L (ref 150–450)
PLATELET # BLD EST: ABNORMAL 10*3/UL
POTASSIUM SERPL-SCNC: 4.9 MMOL/L (ref 3.4–5.3)
RBC # BLD AUTO: 3.92 10E12/L (ref 4.4–5.9)
RBC MORPH BLD: ABNORMAL
SODIUM SERPL-SCNC: 133 MMOL/L (ref 133–144)
SPECIMEN SOURCE: ABNORMAL
WBC # BLD AUTO: 14.5 10E9/L (ref 4–11)

## 2018-10-22 PROCEDURE — 36415 COLL VENOUS BLD VENIPUNCTURE: CPT | Performed by: INTERNAL MEDICINE

## 2018-10-22 PROCEDURE — 25000132 ZZH RX MED GY IP 250 OP 250 PS 637: Performed by: INTERNAL MEDICINE

## 2018-10-22 PROCEDURE — 40000193 ZZH STATISTIC PT WARD VISIT: Performed by: PHYSICAL THERAPY ASSISTANT

## 2018-10-22 PROCEDURE — 80048 BASIC METABOLIC PNL TOTAL CA: CPT | Performed by: INTERNAL MEDICINE

## 2018-10-22 PROCEDURE — 97530 THERAPEUTIC ACTIVITIES: CPT | Mod: GP | Performed by: PHYSICAL THERAPY ASSISTANT

## 2018-10-22 PROCEDURE — 00000146 ZZHCL STATISTIC GLUCOSE BY METER IP

## 2018-10-22 PROCEDURE — 99239 HOSP IP/OBS DSCHRG MGMT >30: CPT | Performed by: INTERNAL MEDICINE

## 2018-10-22 PROCEDURE — 85025 COMPLETE CBC W/AUTO DIFF WBC: CPT | Performed by: INTERNAL MEDICINE

## 2018-10-22 PROCEDURE — 25000125 ZZHC RX 250: Performed by: INTERNAL MEDICINE

## 2018-10-22 RX ORDER — MINOCYCLINE HYDROCHLORIDE 100 MG/1
100 CAPSULE ORAL EVERY 12 HOURS
Qty: 18 CAPSULE | Refills: 0 | Status: SHIPPED | OUTPATIENT
Start: 2018-10-22 | End: 2019-02-17

## 2018-10-22 RX ORDER — GABAPENTIN 100 MG/1
200 CAPSULE ORAL AT BEDTIME
Qty: 60 CAPSULE | Refills: 0 | Status: SHIPPED | OUTPATIENT
Start: 2018-10-22 | End: 2018-11-26

## 2018-10-22 RX ORDER — PREDNISONE 20 MG/1
40 TABLET ORAL DAILY
Qty: 6 TABLET | Refills: 0 | Status: SHIPPED | OUTPATIENT
Start: 2018-10-23 | End: 2019-02-17

## 2018-10-22 RX ORDER — OXYCODONE HYDROCHLORIDE 5 MG/1
2.5 TABLET ORAL EVERY 4 HOURS PRN
Qty: 6 TABLET | Refills: 0 | Status: SHIPPED | OUTPATIENT
Start: 2018-10-22 | End: 2018-10-30 | Stop reason: DRUGHIGH

## 2018-10-22 RX ORDER — CARVEDILOL 3.12 MG/1
3.12 TABLET ORAL 2 TIMES DAILY WITH MEALS
Qty: 60 TABLET | Refills: 0 | Status: SHIPPED | OUTPATIENT
Start: 2018-10-22 | End: 2018-11-26

## 2018-10-22 RX ORDER — TRAMADOL HYDROCHLORIDE 50 MG/1
50 TABLET ORAL EVERY 6 HOURS PRN
Qty: 10 TABLET | Refills: 0 | Status: SHIPPED | OUTPATIENT
Start: 2018-10-22 | End: 2018-10-30 | Stop reason: DRUGHIGH

## 2018-10-22 RX ORDER — MULTIVIT WITH MINERALS/LUTEIN
1 TABLET ORAL DAILY
Qty: 30 TABLET | Refills: 0 | Status: SHIPPED | OUTPATIENT
Start: 2018-10-22 | End: 2018-10-23

## 2018-10-22 RX ORDER — GABAPENTIN 100 MG/1
100 CAPSULE ORAL 2 TIMES DAILY
Qty: 60 CAPSULE | Refills: 0 | Status: SHIPPED | OUTPATIENT
Start: 2018-10-23 | End: 2018-11-16

## 2018-10-22 RX ORDER — LIDOCAINE 40 MG/G
CREAM TOPICAL
Qty: 1 TUBE | Refills: 0 | Status: SHIPPED | OUTPATIENT
Start: 2018-10-22 | End: 2018-10-30

## 2018-10-22 RX ADMIN — CARVEDILOL 3.12 MG: 3.12 TABLET, FILM COATED ORAL at 08:13

## 2018-10-22 RX ADMIN — DICLOFENAC 2 G: 10 GEL TOPICAL at 11:33

## 2018-10-22 RX ADMIN — INSULIN ASPART 1 UNITS: 100 INJECTION, SOLUTION INTRAVENOUS; SUBCUTANEOUS at 08:14

## 2018-10-22 RX ADMIN — DICLOFENAC 2 G: 10 GEL TOPICAL at 08:17

## 2018-10-22 RX ADMIN — DICLOFENAC 2 G: 10 GEL TOPICAL at 16:22

## 2018-10-22 RX ADMIN — PREDNISONE 40 MG: 20 TABLET ORAL at 08:13

## 2018-10-22 RX ADMIN — ACETAMINOPHEN 650 MG: 325 TABLET, FILM COATED ORAL at 08:33

## 2018-10-22 RX ADMIN — OMEPRAZOLE 20 MG: 20 CAPSULE, DELAYED RELEASE ORAL at 11:33

## 2018-10-22 RX ADMIN — LISINOPRIL 20 MG: 20 TABLET ORAL at 08:13

## 2018-10-22 RX ADMIN — Medication 2.5 MG: at 02:24

## 2018-10-22 RX ADMIN — GABAPENTIN 100 MG: 100 CAPSULE ORAL at 11:33

## 2018-10-22 RX ADMIN — MINOCYCLINE HYDROCHLORIDE 100 MG: 100 CAPSULE ORAL at 08:13

## 2018-10-22 RX ADMIN — TRAMADOL HYDROCHLORIDE 50 MG: 50 TABLET, COATED ORAL at 12:31

## 2018-10-22 RX ADMIN — TRAMADOL HYDROCHLORIDE 50 MG: 50 TABLET, COATED ORAL at 05:56

## 2018-10-22 RX ADMIN — ACETAMINOPHEN 650 MG: 325 TABLET, FILM COATED ORAL at 02:24

## 2018-10-22 RX ADMIN — ASPIRIN 325 MG ORAL TABLET 325 MG: 325 PILL ORAL at 08:13

## 2018-10-22 RX ADMIN — GABAPENTIN 100 MG: 100 CAPSULE ORAL at 08:13

## 2018-10-22 RX ADMIN — TAMSULOSIN HYDROCHLORIDE 0.4 MG: 0.4 CAPSULE ORAL at 08:14

## 2018-10-22 RX ADMIN — FINASTERIDE 5 MG: 5 TABLET, FILM COATED ORAL at 08:52

## 2018-10-22 RX ADMIN — METFORMIN HYDROCHLORIDE 1000 MG: 500 TABLET ORAL at 08:14

## 2018-10-22 ASSESSMENT — PAIN DESCRIPTION - DESCRIPTORS: DESCRIPTORS: OTHER (COMMENT)

## 2018-10-22 ASSESSMENT — ACTIVITIES OF DAILY LIVING (ADL)
ADLS_ACUITY_SCORE: 13
ADLS_ACUITY_SCORE: 14
ADLS_ACUITY_SCORE: 13
ADLS_ACUITY_SCORE: 13
ADLS_ACUITY_SCORE: 14

## 2018-10-22 NOTE — PROGRESS NOTES
Patient continues to improve. States his bilateral knee pain has decreased but notes that he is still unable to walk.     On exam the patient is less TTP bilaterally.   He is able to SLR bilaterally.   Flexion to 90 on left, 80 on right.   NVI   +dp pulses       Patient states his PT for this am is scheduled shortly. Will see how patient improves w/ gout TX. 2 doses of prednisone on board.   Patient insists on cortisone injections to bilateral knees.   Gram stain negative, cultures continue to be negative. Will see how patient does w/ therapy today and discuss injection tomorrow.   He voices understanding and agreement with the plan.   All questions were answered.     Michelle Ribeiro PA-C on 10/22/2018 at 10:21 AM   Orthopedics

## 2018-10-22 NOTE — PLAN OF CARE
"Problem: Skin and Soft Tissue Infection (Adult)  Goal: Signs and Symptoms of Listed Potential Problems Will be Absent, Minimized or Managed (Skin and Soft Tissue Infection)  Signs and symptoms of listed potential problems will be absent, minimized or managed by discharge/transition of care (reference Skin and Soft Tissue Infection (Adult) CPG).   Outcome: No Change  BP elevated, does not meet parameters for notifying MD or intervention, other VSS on 1 LPM. Tmax 99.6, prn tylenol given with improvement on recheck. C/O bilateral knee pain 9/10 with movement, denies pain at rest. Requires encouragement and A2 to turn/reposition in bed. PRN tylenol, oxycodone and tramadol given for pain. On mod carb diet, tolerating clears and PO meds this shift. PIV SL. Voiding into urinal independently with good UO. BS+, passing gas, BM on prior shift.  overnight. LLE dressing CDI; pt denies numbness/tingling this shift, CMS intact per pt. Pt voicing frustrations regarding \"lack of direction for care,\" \"poor pain management,\" \"poor planning\" for patient and lack of \"progress.\" Active listening used, contact information provided for Patient Relations, charge RN made aware. Bed alarms in use, pt not attempting to exit bed. Alert and oriented, able to make needs known. Continue to monitor.      "

## 2018-10-22 NOTE — PLAN OF CARE
Problem: Patient Care Overview  Goal: Plan of Care/Patient Progress Review  Pt A&O, VSS on 1L room air.  Max temp 99.7.  Encourage IS and movement, however pt declines to get out of bed.  Pain controlled with voltaren gel, oxycodone, tylenol and scheduled gabapentin.  LS clear.   CMS intact with baseline N&T.  IV saline locked.  Rash present on body, po benadryl given on days to help with itching.  Voiding adequate amounts in urinal in bed.  BG this shift were 104 and 273.  Pts dinners mainly consist of cereal, 2 ice creams and cookies.     Pt expressed disappointment in his hospital stay that the issue with his knees is being overlooked and he believes cortisone shots would help decrease the pain enough that he could get out of bed.  He feels he is being kept in the dark as far as his treatment plan, and doesn't know if the plan is still to discharge to a TCU.  He states there is no way he can go home because he can't get out of bed.  I encouraged him to verbalize his feelings to his care team on Monday.

## 2018-10-22 NOTE — TELEPHONE ENCOUNTER
Reason for call:  Other   Patient called regarding (reason for call): call back  Additional comments: [facility] 6th floor calling in regarding patient, would like a callback asap.    Phone number to reach patient:  Other phone number:  [phone]    Best Time:  any    Can we leave a detailed message on this number?  Not Applicable

## 2018-10-22 NOTE — PROGRESS NOTES
[facility]  Infectious Disease Progress Note          Assessment and Plan:   IMPRESSION:   1.  73-year-old diabetic male with acute left leg and foot cellulitis, definite concern for third toe osteomyelitis with a grossly abnormal toe, some systemic toxicity with blood cultures pending.   2.  ANAPHYLACTIC PENICILLIN ALLERGY.   3.  Diabetes mellitus.   4.  Mild renal insufficiency, improved.   5.  Prior history of foot and leg cellulitis.   6 Hypoxia, some interstitial changes on imaging but doubt infection  7 Bilat knee pain, both knees + inflamm and crystals  8 New rash, ? ancef      RECOMMENDATIONS:   1. Off ancef and improved rash,  po ready any way  2. Note operative findings, ., cultures MSSA,, po OK if disposition,   3 Doubt lungs are infection,  4 Knee taps crystal disease, likely explains fever, tx gout/pseudogout  4 OK disposition 10 days po minocin            Interval History:   no new complaints and knee pain biggest co no cp, sob, n/v/d, or abd pain. Mild hypoxia, foot cx staph only sens pending, toe amp, fever intermittently biggest co chronic knee pain worse and tap as noted pepto strep in foot cx, knee cxs neg              Medications:       aspirin  325 mg Oral Daily     carvedilol  3.125 mg Oral BID w/meals     diclofenac  2 g Transdermal 4x Daily     enoxaparin  40 mg Subcutaneous Q24H     finasteride  5 mg Oral Daily     gabapentin  100 mg Oral BID     gabapentin  200 mg Oral At Bedtime     hypromellose-dextran  1 drop Both Eyes QAM     insulin aspart  1-10 Units Subcutaneous TID AC     insulin aspart  1-7 Units Subcutaneous At Bedtime     lisinopril  20 mg Oral Daily     loratadine  10 mg Oral At Bedtime     metFORMIN  1,000 mg Oral BID w/meals     minocycline  100 mg Oral Q12H MARGARITA     omeprazole  20 mg Oral QAM AC     predniSONE  40 mg Oral Daily     sodium chloride (PF)  3 mL Intracatheter Q8H     tamsulosin  0.4 mg Oral Daily                  Physical Exam:   Blood  pressure 147/76, pulse 109, temperature 95.9  F (35.5  C), temperature source Oral, resp. rate 16, height 1.829 m (6'), weight 118.2 kg (260 lb 9.6 oz), SpO2 95 %.  Wt Readings from Last 2 Encounters:   10/21/18 118.2 kg (260 lb 9.6 oz)   08/27/18 118.3 kg (260 lb 14.4 oz)     Vital Signs with Ranges  Temp:  [95.3  F (35.2  C)-99.7  F (37.6  C)] 95.9  F (35.5  C)  Heart Rate:  [79-94] 79  Resp:  [16-20] 16  BP: (125-172)/(66-84) 147/76  SpO2:  [90 %-95 %] 95 %    Constitutional: Awake, alert, cooperative, no apparent distress   Lungs: Clear to auscultation bilaterally, no crackles or wheezing   Cardiovascular: Regular rate and rhythm, normal S1 and S2, and no murmur noted   Abdomen: Normal bowel sounds, soft, non-distended, non-tender   Skin: New rash arms and back ? Early drug rash, no cyanosis, some edema foot wrapped knees about same   Other:           Data:   All microbiology laboratory data reviewed.  Recent Labs   Lab Test  10/22/18   0628  10/21/18   0619  10/20/18   0614   WBC  14.5*  13.2*  12.6*   HGB  11.1*  10.5*  10.5*   HCT  36.0*  33.7*  33.6*   MCV  92  91  92   PLT  488*  418  376     Recent Labs   Lab Test  10/22/18   0628  10/21/18   0619  10/20/18   0614   CR  1.01  1.11  0.98     No lab results found.  Recent Labs   Lab Test  10/20/18   1401  10/20/18   1400  10/17/18   1858  10/17/18   1854  10/15/18   1615  10/14/18   1455  10/14/18   1443  06/19/18   1459  06/19/18   1448   CULT  Culture negative monitoring continues  Culture negative monitoring continues  No growth after 5 days  No growth after 5 days  Moderate growth  Finegoldia magna (Peptostreptococcus parish)  Susceptibility testing not routinely done  *  Moderate growth  Staphylococcus aureus  *  No growth  No growth  No growth  No growth

## 2018-10-22 NOTE — PROGRESS NOTES
SWS     D: Discharge planning continuing... Per discussion with MD, plan for discharge to rehab facility today. [facility] has been contacted, they will be able to accept pt today.      I: Met with pt and discussed above, he has asked that planning continue for his transfer to [facility], medical transport has been requested. Discussed with pt that his Medicare does not cover w/c transport, [facility] so discussed with him that transport cost would be $70/base and $4.50/mi which he acknowledged and accepted. [facility], w/c arranged for 1630. Per discussion this morning with RN it is recommended that arrangements be made for portable oxygen for pt, SW has discussed with pt who informs that his o2 sat runs low at baseline, and therefore has declined the o2 for the transport, SW has notified RN of his decision. Additional questions of pt were addressed.      A/P: Anticipate no problem with arrangements made for transfer, with discharge no further SWS.

## 2018-10-22 NOTE — DISCHARGE SUMMARY
[facility]  Discharge Summary  Hospitalist      Date of Admission:  10/14/2018  Date of Discharge:  10/22/2018  Provider:  Charlotte Pugh MD  Date of Service (when I last saw the patient): 10/22/18      Primary Provider: Anh Spivey          Discharge Diagnosis:   Discharge Diagnoses   Right third toe infection status post amputation  Sepsis secondary to toe infection  Acute gout  Urticaria  Acute renal failure      Other medical issues:  Past Medical History:   Diagnosis Date     Chronic rhinitis      Diabetes mellitus (H)      HTN (hypertension)      Hypertrophy of prostate with urinary obstruction and other lower urinary tract symptoms (LUTS)      TIA (transient ischaemic attack) 1993     Unspecified transient cerebral ischemia 1993    No definite source found          History of Present Illness   Lance Ibrahim is an 73 year old male who presented with fever and chills.  Please see the admission history and physical for full details.    Hospital Course     Lance Ibrahim was admitted on 10/14/2018.  73-year-old male with past medical history significant for diabetes mellitus, recurrent lower extremity infection, recent hospitalization for osteomyelitis, presented with left lower extremity infection.  On presentation patient had fever chills tachycardia leukocytosis and elevated lactic acid levels.  He was admitted for sepsis.  Patient was seen by podiatry and underwent debridement along with third toe amputation on 10/16/2018.  Postoperatively developed shortness of breath with significant hypoxia blood cultures positive for MSSA-.  ID was consulted and patient was started on Ancef.  However after several days of being on as Ancef patient developed urticaria and drug reaction.  Was switched to minocycline patient is being discharged on 10 days of minocycline.  Also during hospitalization patient complained of bilateral knee pain and swelling.  Orthopedic surgery was consulted.  Both knees  were aspirated showed presence of crystals concerning for gout.  He was started on prednisone short course both treatment for acute gout.  Patient still requires supplemental O2.      Following problems were addressed during his hospitalization:    Sepsis secondary to left third toe osteomyelitis  --Status post amputation  --Initially treated with Ancef now discharged on minocycline  --Improved with IV fluids antibiotics and surgical intervention  --Postop day #5.  Transfer to transitional care unit for rehab  --Blood cultures positive for MSSA sensitive to miocycline  --Postoperatively continues to require 1 L of supplemental O2 wean as able    Acute drug reaction  --Patient developed rash involving his back and upper extremity.  Rash consistent with drug reaction and urticaria  --Likely secondary to Ancef  --Discontinued antibiotic treated with steroids and Benadryl    Acute gout right knee and osteoarthritis bilaterally  --Bilateral knee pain secondary to gout new diagnosis.  --Is being treated with prednisone for 5 days  --Has clinical improvement  --Will need rehab  --Patient to follow-up with orthopedic surgery as outpatient for topical steroid injections    Acute renal failure on admission  --Resolved after initial hydration    Hypertension  --Continued on lisinopril    Diabetes mellitus type 2  --Prior to admission was on metformin thousand milligrams twice daily  --During hospitalization he also required sliding scale insulin.  Will need to continue sliding scale insulin especially now that patient is on prednisone for the next 5 days.              Significant Results and Procedures   As noted above    Pending Results   Unresulted Labs Ordered in the Past 30 Days of this Admission     Date and Time Order Name Status Description    10/20/2018 1342 Fluid Culture Aerobic Bacterial Preliminary     10/20/2018 1342 Fluid Culture Aerobic Bacterial Preliminary     10/17/2018 1814 Blood culture Preliminary      10/17/2018 1814 Blood culture Preliminary           Code Status   Full Code       Primary Care Physician   Anh Spivey    Physical Exam   Temp: 95.9  F (35.5  C) Temp src: Oral BP: 147/76   Heart Rate: 79 Resp: 16 SpO2: 95 % O2 Device: Nasal cannula Oxygen Delivery: 1 LPM  Vitals:    10/14/18 1457 10/20/18 0934 10/21/18 0407   Weight: 116.6 kg (257 lb) 115.2 kg (254 lb) 118.2 kg (260 lb 9.6 oz)     Vital Signs with Ranges  Temp:  [95.3  F (35.2  C)-99.7  F (37.6  C)] 95.9  F (35.5  C)  Heart Rate:  [79-94] 79  Resp:  [16-20] 16  BP: (125-172)/(66-84) 147/76  SpO2:  [90 %-95 %] 95 %  I/O last 3 completed shifts:  In: 920 [P.O.:920]  Out: 1500 [Urine:1500]    GEN:  Alert, oriented x 3, appears comfortable, NAD.  HEENT:  Normocephalic/atraumatic, no scleral icterus, no nasal discharge, mouth moist.  CV:  Regular rate and rhythm, no murmur or JVD.  S1 + S2 noted, no S3 or S4.  LUNGS:  Clear to auscultation bilaterally without rales/rhonchi/wheezing/retractions.  Symmetric chest rise on inhalation noted.  ABD:  Active bowel sounds, soft, non-tender/non-distended.  No rebound/guarding/rigidity.  EXT:  No edema.  No cyanosis.  Right knee swelling.  SKIN:  Dry to touch, urticaria involving back of arms and back  Discharge Disposition   Discharged to short-term care facility    Consultations This Hospital Stay   PHARMACY TO DOSE VANCO  INFECTIOUS DISEASES IP CONSULT  PODIATRY IP CONSULT  PHARMACY TO DOSE VANCO  CARE COORDINATOR IP CONSULT  PHYSICAL THERAPY ADULT IP CONSULT  SOCIAL WORK IP CONSULT  SOCIAL WORK IP CONSULT  ORTHOPEDIC SURGERY IP CONSULT  PHYSICAL THERAPY ADULT IP CONSULT  OCCUPATIONAL THERAPY ADULT IP CONSULT    Time Spent on this Encounter   Charlotte PHILLIPS, personally saw the patient today and spent greater than 30 minutes discharging this patient.    Discharge Orders     Follow-up and recommended labs and tests    Follow up with Dr. Azevedo in 1 week from discharge from the hospital.  For  APPOINTMENTS: [phone].  For questions or concerns: call: [phone]     Activity   Your activity upon discharge: minimal heel weight bearing in post op boot left foot.     Wound care and dressings   Instructions to care for your wound at home: keep foot and dressing dry. Will change at first clinic visit.     General info for SNF   Length of Stay Estimate: Short Term Care: Estimated # of Days <30  Condition at Discharge: Improving  Level of care:skilled   Rehabilitation Potential: Good  Admission H&P remains valid and up-to-date: Yes  Recent Chemotherapy: N/A  Use Nursing Home Standing Orders: Yes     Mantoux instructions   Give two-step Mantoux (PPD) Per Facility Policy Yes     Reason for your hospital stay   Please refer to discharge summary.  Patient admitted for diabetic foot infection.  Postoperatively patient developed gout and reacted to Ancef.  He is being discharged with treatment for gout and antibiotic for foot infection. now switched to minocycline.  Discharge to transitional care unit for rehab  We will need to monitor blood glucose closely as patient is diabetic and is on prednisone  Still requires supplemental O2 wean as able     Glucose monitor nursing POCT   Before meals and at bedtime     Intake and output   Every shift     Follow Up and recommended labs and tests   Follow up with snf physician.  The following labs/tests are recommended: CBC in 1-2 weeks  Follow-up with orthopedic surgery for bilateral osteoarthritis and new diagnosis of gout.  Follow-up with podiatry.  For post follow-up  Follow with primary care provider for diabetes management and monitor blood pressure and further evaluation for gout if necessary.  Wean supplemental O2  Monitor blood glucose especially while patient is on prednisone     Wound care (specify)   Site: Postop care  Instructions: As directed     Activity - Up with nursing assistance     Full Code     Physical Therapy Adult Consult   Evaluate and  treat as clinically indicated.    Reason: Postop     Occupational Therapy Adult Consult   Evaluate and treat as clinically indicated.    Reason: Deconditioning     Oxygen - Nasal cannula   1-2 Lpm by nasal cannula to keep O2 sats 92% or greater.  Wean as able     Fall precautions     Advance Diet as Tolerated   Follow this diet upon discharge: Orders Placed This Encounter     Moderate Consistent CHO Diet       Discharge Medications   Current Discharge Medication List      START taking these medications    Details   carvedilol (COREG) 3.125 MG tablet Take 1 tablet (3.125 mg) by mouth 2 times daily (with meals)  Qty: 60 tablet, Refills: 0    Associated Diagnoses: Essential hypertension      diclofenac (VOLTAREN) 1 % GEL topical gel Place 2 g onto the skin 4 times daily Leg and knee  Qty: 1 Tube, Refills: 0    Associated Diagnoses: Multiple joint pain      !! gabapentin (NEURONTIN) 100 MG capsule Take 1 capsule (100 mg) by mouth 2 times daily  Qty: 60 capsule, Refills: 0    Associated Diagnoses: Neuropathy      !! gabapentin (NEURONTIN) 100 MG capsule Take 2 capsules (200 mg) by mouth At Bedtime  Qty: 60 capsule, Refills: 0    Associated Diagnoses: Neuropathy      !! insulin aspart (NOVOLOG PEN) 100 UNIT/ML injection Inject 1-10 Units Subcutaneous 3 times daily (before meals)    Associated Diagnoses: Type 2 diabetes mellitus with hyperosmolarity without coma, unspecified whether long term insulin use (H)      !! insulin aspart (NOVOLOG PEN) 100 UNIT/ML injection Inject 1-7 Units Subcutaneous At Bedtime    Associated Diagnoses: Type 2 diabetes mellitus with hyperosmolarity without coma, unspecified whether long term insulin use (H)      lidocaine (LMX4) 4 % CREA cream Apply topically every hour as needed for pain (with VAD insertion or accessing implanted port.)  Qty: 1 Tube, Refills: 0    Associated Diagnoses: Acute gouty arthropathy      minocycline (MINOCIN/DYNACIN) 100 MG capsule Take 1 capsule (100 mg) by mouth  every 12 hours for 9 days  Qty: 18 capsule, Refills: 0    Associated Diagnoses: Diabetic foot infection (H)      omeprazole (PRILOSEC) 20 MG CR capsule Take 1 capsule (20 mg) by mouth every morning (before breakfast)  Qty: 30 capsule, Refills: 0    Associated Diagnoses: Acute superficial gastritis without hemorrhage      oxyCODONE IR (ROXICODONE) 5 MG tablet Take 0.5 tablets (2.5 mg) by mouth every 4 hours as needed for severe pain  Qty: 6 tablet, Refills: 0    Associated Diagnoses: Multiple joint pain      predniSONE (DELTASONE) 20 MG tablet Take 2 tablets (40 mg) by mouth daily for 3 days  Qty: 6 tablet, Refills: 0    Associated Diagnoses: Acute gouty arthropathy      traMADol (ULTRAM) 50 MG tablet Take 1 tablet (50 mg) by mouth every 6 hours as needed for moderate pain  Qty: 10 tablet, Refills: 0    Associated Diagnoses: Multiple joint pain       !! - Potential duplicate medications found. Please discuss with provider.      CONTINUE these medications which have CHANGED    Details   Multiple Vitamins-Minerals (CENTRUM SILVER) per tablet Take 1 tablet by mouth daily  Qty: 30 tablet, Refills: 0    Comments: HOLD while on ntibiotics  Associated Diagnoses: Diabetic foot infection (H)         CONTINUE these medications which have NOT CHANGED    Details   aspirin 325 MG tablet Take 325 mg by mouth daily       ferrous gluconate (FERGON) 324 (38 FE) MG tablet Take 1 tablet by mouth daily.      finasteride (PROSCAR) 5 MG tablet Take 5 mg by mouth daily.      fluticasone (FLONASE) 50 MCG/ACT spray Spray 1 spray into both nostrils 2 times daily      lisinopril (PRINIVIL/ZESTRIL) 10 MG tablet TAKE 2 TABLETS (20 MG) BY MOUTH DAILY  Qty: 180 tablet, Refills: 3    Associated Diagnoses: Essential hypertension      metFORMIN (GLUCOPHAGE) 1000 MG tablet TAKE 1 TABLET (1,000 MG) BY MOUTH 2 TIMES DAILY (WITH MEALS) **DUE FOR APRIL APPT/LABS. CALL MD**  Qty: 180 tablet, Refills: 1    Associated Diagnoses: Type 2 diabetes mellitus  "without complication, without long-term current use of insulin (H)      tamsulosin (FLOMAX) 0.4 MG 24 hr capsule Take 1 capsule by mouth daily.      blood glucose monitoring (ACCU-CHEK SMARTVIEW) test strip USE TO TEST TWICE DAILY  Qty: 200 strip, Refills: 1    Comments: Type 2 diabetes mellitus with chronic kidney disease, with long-term current use of insulin, unspecified CKD stage (H) [E11.22, Z79.4]  Associated Diagnoses: Type 2 diabetes mellitus with chronic kidney disease, with long-term current use of insulin, unspecified CKD stage (H)      loratadine (CLARITIN) 10 MG tablet Take 10 mg by mouth At Bedtime         STOP taking these medications       naproxen sodium (ALEVE) 220 MG capsule Comments:   Reason for Stopping:         propylene glycol (SYSTANE BALANCE) 0.6 % SOLN Comments:   Reason for Stopping:             Allergies   Allergies   Allergen Reactions     Penicillins      \"anaphylactic\"     Sulfa Drugs      \"itchy rash, swelling of face and hands\"     Ancef [Cefazolin] Rash     Data   Most Recent 3 CBC's:  Recent Labs   Lab Test  10/22/18   0628  10/21/18   0619  10/20/18   0614   WBC  14.5*  13.2*  12.6*   HGB  11.1*  10.5*  10.5*   MCV  92  91  92   PLT  488*  418  376      Most Recent 3 BMP's:  Recent Labs   Lab Test  10/22/18   0628  10/21/18   0619  10/20/18   0614   NA  133  134  133   POTASSIUM  4.9  4.7  4.5   CHLORIDE  99  97  98   CO2  27  29  28   BUN  30  22  20   CR  1.01  1.11  0.98   ANIONGAP  7  8  7   SHANNAN  9.4  8.9  8.8   GLC  166*  154*  145*     Most Recent 2 LFT's:  Recent Labs   Lab Test  05/01/18   1332  10/22/16   2052   AST  14  15   ALT  30  28   ALKPHOS  67  84   BILITOTAL  0.1*  0.2     Most Recent INR's and Anticoagulation Dosing History:  Anticoagulation Dose History     Recent Dosing and Labs Latest Ref Rng & Units 12/24/2012    INR 0.86 - 1.14 1.05        Most Recent 3 Troponin's:  Recent Labs   Lab Test  10/18/18   0739  05/01/18   1342   TROPI  0.017   --    TROPONIN   " --   0.00     Most Recent Cholesterol Panel:  Recent Labs   Lab Test  10/07/17   1036   CHOL  151   LDL  72   HDL  65   TRIG  68     Most Recent 6 Bacteria Isolates From Any Culture (See EPIC Reports for Culture Details):  Recent Labs   Lab Test  10/20/18   1401  10/20/18   1400  10/17/18   1858  10/17/18   1854  10/15/18   1615  10/14/18   1455   CULT  Culture negative monitoring continues  Culture negative monitoring continues  No growth after 5 days  No growth after 5 days  Moderate growth  Finegoldia magna (Peptostreptococcus parish)  Susceptibility testing not routinely done  *  Moderate growth  Staphylococcus aureus  *  No growth     Most Recent TSH, T4 and A1c Labs:  Recent Labs   Lab Test  10/17/18   0709   05/01/18   1332   TSH   --    --   2.19   A1C  6.4*   < >   --     < > = values in this interval not displayed.     Results for orders placed or performed during the hospital encounter of 10/14/18   Foot XR, G/E 3 views, left    Narrative    LEFT FOOT THREE OR MORE VIEWS    10/14/2018 4:13 PM     HISTORY: Ulceration distal third toe, marked cellulitis, question  fracture or subcutaneous air.      COMPARISON: 6/19/2018.      Impression    IMPRESSION: No fracture. There is some cortical erosion and bony  irregularity along the distal third toe distal phalanx that could  represent osteomyelitis. This appears new since the prior examination.  There is soft tissue swelling in this area.       KEL SALEH MD   XR Tibia & Fibula Left 2 Views    Narrative    LEFT TIBIA AND FIBULA TWO VIEWS     10/14/2018 4:13 PM     HISTORY: Cellulitis, question subcutaneous air.    COMPARISON: None.      Impression    IMPRESSION: No fracture, no destructive-appearing bony lesion is seen.  Vascular calcifications. Detection of subcutaneous gas is limited and  not clearly evident. Small subcutaneous gas cannot be entirely  excluded.       KEL SALEH MD   MR Foot Left w/o & w Contrast    Narrative    MR FOOT LEFT WITH AND WITHOUT  CONTRAST October 15, 2018 9:28 AM    HISTORY: Evaluate for osteomyelitis.      COMPARISON: X-ray from 10/14/2018.    TECHNIQUE: Routine multiplanar, multisequence imaging was performed.  11 mL Gadavist.    FINDINGS: Prominent first MTP joint degenerative changes involving the  sesamoids and the first MTP joint. There is a joint effusion here.  There is abnormal bone marrow edema in the proximal phalanx of the  great toe that could be degenerative in nature or related to  osteomyelitis. Remaining bone marrow signal is normal.    There is abnormal edema in the distal phalanx of the third toe as well  suspicious for osteomyelitis. There is diffuse prominent subcutaneous  edema throughout the foot with subcutaneous edema most prominent in  the third toe. No evidence of abscess.      Impression    IMPRESSION:   1. Abnormal bone marrow edema in the proximal phalanx of the great  toe. There are prominent first MTP joint degenerative changes and this  edema could be degenerative in nature but is slightly more prominent  than typically seen and osteomyelitis could be present in the proximal  phalanx of the great toe as well.  2. There is abnormal bone marrow edema in the distal phalanx of the  third toe with surrounding soft tissue edema suspicious for  osteomyelitis.  3. Diffuse subcutaneous edema consistent with cellulitis. No evidence  of abscess.    GREG ORTIZ MD   US Lower Extremity Venous Duplex Left    Narrative    ULTRASOUND VENOUS LOWER EXTREMITY UNILATERAL LEFT October 15, 2018  1:34 AM     HISTORY: Rule out deep vein thrombosis, leg swelling.     COMPARISON: None.    TECHNIQUE: Ultrasound gray scale, Color Doppler flow, and spectral  Doppler waveform analysis performed.    FINDINGS: The left common femoral, superficial femoral, popliteal and  posterior tibial veins are patent and fully compressible and  demonstrate normal venous Doppler flow. The visualized greater  saphenous vein is negative for thrombus.       Impression    IMPRESSION: No deep vein thrombosis demonstrated.    CATRINA VANG MD   XR Chest Port 1 View    Narrative    CHEST SINGLE VIEW PORTABLE  10/16/2018 3:45 AM     HISTORY: Dyspnea. Hypoxia.    COMPARISON: None.    FINDINGS: Moderately increased interstitial opacities in both lungs.  Mild cardiomegaly. Atherosclerotic calcification in the thoracic  aorta. Two surgical screws in the left humeral head.      Impression    IMPRESSION: Moderately increased interstitial opacities in both lungs,  likely representing interstitial pulmonary edema.    NATALIE MADRIGAL MD   XR Foot Port Left 3 Views    Narrative    FOOT PORTABLE LEFT THREE VIEWS  10/17/2018 5:57 PM     HISTORY: Postoperative. New base line.       Impression    IMPRESSION: Third toe amputation at the metatarsophalangeal joint, new  since 10/14/2018. Bunionectomy and hallux valgus, unchanged.    SOPHY HOLCOMB MD   XR Chest Port 1 View    Narrative    XR CHEST PORT 1 VW  10/17/2018 7:50 PM     HISTORY:  Hypoxia;     COMPARISON: Film dated 10/16/2018    FINDINGS:  The heart is enlarged. When compared to the previous film,  there has been an improvement in the interstitial edema. No  alveolar-type infiltrates are seen.      Impression    IMPRESSION:  1. Cardiomegaly.  2. Improved interstitial edema.    TUCKER BO MD   CT Chest Pulmonary Embolism w Contrast    Narrative    CT CHEST WITH CONTRAST  10/17/2018 11:14 PM     HISTORY: Hypoxia. Tachycardia.    COMPARISON: 10/22/2016 - CT abdomen and pelvis    TECHNIQUE: Following the uneventful administration of 80 mL Isovue-370  intravenous contrast, helical sections were acquired through the lungs  according to the pulmonary embolism protocol. Coronal reconstructions  were generated. Radiation dose for this scan was reduced using  automated exposure control, adjustment of the mA and/or kV according  to the patient's size, or iterative reconstruction technique.     FINDINGS: The central pulmonary arteries are  mildly large in caliber.  No visualized pulmonary embolus. The thoracic aorta is normal in  caliber without dissection. Atherosclerotic calcification in the  thoracic aorta and coronary arteries.    Mild to moderate interstitial thickening and ground-glass opacities  scattered throughout both lungs with an upper lobe predominance. Very  small bilateral pleural effusions with adjacent atelectasis. No  pericardial effusion. A few nonspecific borderline and mildly enlarged  mediastinal and bilateral hilar lymph nodes. Two surgical screws are  present in the left humeral head.    Scan through the upper abdomen is significant for mild nodularity of  both adrenal glands that most likely relates to adenomas and a 3 cm  cyst in the upper pole of the left kidney.      Impression    IMPRESSION:  1. Mild to moderate interstitial thickening and ground-glass opacities  scattered within both lungs. These most likely relate to pulmonary  edema, but could also be infectious or inflammatory in etiology.  2. Very small bilateral pleural effusions.  3. No visualized pulmonary embolus.    NATALIE MADRIGAL MD   XR Knee Port Left 3 Views    Narrative    LEFT KNEE THREE VIEWS   10/20/2018 3:25 PM     HISTORY: Bilateral knee pain.     COMPARISON: None.      Impression    IMPRESSION: Moderate osteoarthritis lateral knee joint compartment and  patellofemoral joint especially laterally. Effusion in the  suprapatellar bursa.    NOLVIA LICONA MD   XR Knee Port Right 3 Views    Narrative    RIGHT KNEE THREE VIEWS   10/20/2018 3:25 PM     HISTORY: Knee pain.     COMPARISON: None.      Impression    IMPRESSION: Mild osteoarthritis lateral knee joint compartment. Severe  osteoarthritis of the patellofemoral joint especially laterally.  Effusion in the suprapatellar bursa.    NOLVIA LICONA MD           Disclaimer: This note consists of symbols derived from keyboarding, dictation and/or voice recognition software. As a result, there may be errors in  the script that have gone undetected. Please consider this when interpreting information found in this chart.

## 2018-10-22 NOTE — PLAN OF CARE
Problem: Patient Care Overview  Goal: Plan of Care/Patient Progress Review  Outcome: Adequate for Discharge Date Met: 10/22/18  Pt A&Ox4. Pt was frustrated early morning regarding plan of care. Pt had a talk with ortho and hospitalitis. Pt comfortable with plan of care at this time. Pt verbalized that he feels some improvement today. Worked with  therapy but was unable to stand due to knee pain. Given PRN tramadol and tylenol. Reports pain only with movement,  Changed surgical dressing to LLE per podiatrist telephone order. Sutures in place, no s/s of infection noted. Saline locked. Mod carb diet.  and 130. Weaned oxygen off. PT sating over 88 on RA when up.  Continues to have generalized rash. Denied Pruritic. Voiding well. Discharging to [facility] today. Transpiration set for 4:30pm,. VSS

## 2018-10-22 NOTE — TELEPHONE ENCOUNTER
Returned call.  Pt still in house for knee pain, hopefully discharging soon.  RN wondering about dressing.  I advised dressing change with dry 4x4s, kerlix, ACE.  Keep c/d/i until clinic f/u.  Orders placed prior.  All questions answered.

## 2018-10-22 NOTE — PLAN OF CARE
Problem: Patient Care Overview  Goal: Plan of Care/Patient Progress Review        Discharge Planner PT   Patient plan for discharge: TCU  Current status: Pt transfers supine to sit with supervision and indep with scooting to EOB. Pt able to sit on EOB with supervision to indep x 12 min. Pt unable to stand d/t pain in B knees L>R. Pt was able to perform knee ext on the R with minimal pain and had increase pain on the L with assist. Note toes on the L LE to increase in color ( purple) - pt transfers supine to sit with mod assist with B LEs. Placed pillows under L LE when back in bed.   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    Rationale for recommendations: Currently functioning well below baseline, will need ongoing skilled PT intervention to improve independence and safety with functional mobility skills prior to returning home.  Unlikely to reach sufficient mobility goals during hospitalization, but will continue to reassess.        Entered by: Radha Sauer 10/22/2018 10:07 AM

## 2018-10-23 ENCOUNTER — NURSING HOME VISIT (OUTPATIENT)
Dept: GERIATRICS | Facility: CLINIC | Age: 73
End: 2018-10-23
Payer: COMMERCIAL

## 2018-10-23 ENCOUNTER — PATIENT OUTREACH (OUTPATIENT)
Dept: INTERNAL MEDICINE | Facility: CLINIC | Age: 73
End: 2018-10-23

## 2018-10-23 VITALS
OXYGEN SATURATION: 93 % | DIASTOLIC BLOOD PRESSURE: 68 MMHG | RESPIRATION RATE: 17 BRPM | BODY MASS INDEX: 33.86 KG/M2 | HEART RATE: 92 BPM | WEIGHT: 250 LBS | SYSTOLIC BLOOD PRESSURE: 148 MMHG | TEMPERATURE: 97.9 F | HEIGHT: 72 IN

## 2018-10-23 DIAGNOSIS — M17.0 OSTEOARTHRITIS OF BOTH KNEES, UNSPECIFIED OSTEOARTHRITIS TYPE: ICD-10-CM

## 2018-10-23 DIAGNOSIS — M10.9 GOUT OF KNEE, UNSPECIFIED CAUSE, UNSPECIFIED CHRONICITY, UNSPECIFIED LATERALITY: ICD-10-CM

## 2018-10-23 DIAGNOSIS — D64.9 ANEMIA, UNSPECIFIED TYPE: ICD-10-CM

## 2018-10-23 DIAGNOSIS — A41.01 SEPSIS DUE TO METHICILLIN SUSCEPTIBLE STAPHYLOCOCCUS AUREUS (H): Primary | ICD-10-CM

## 2018-10-23 DIAGNOSIS — I10 ESSENTIAL HYPERTENSION: ICD-10-CM

## 2018-10-23 DIAGNOSIS — R53.81 PHYSICAL DECONDITIONING: ICD-10-CM

## 2018-10-23 DIAGNOSIS — L50.9 URTICARIA: ICD-10-CM

## 2018-10-23 DIAGNOSIS — Z89.422 STATUS POST AMPUTATION OF TOE OF LEFT FOOT (H): ICD-10-CM

## 2018-10-23 DIAGNOSIS — M86.9 OSTEOMYELITIS OF LEFT FOOT, UNSPECIFIED TYPE (H): ICD-10-CM

## 2018-10-23 DIAGNOSIS — E11.59 TYPE 2 DIABETES MELLITUS WITH OTHER CIRCULATORY COMPLICATION, WITHOUT LONG-TERM CURRENT USE OF INSULIN (H): ICD-10-CM

## 2018-10-23 PROCEDURE — 99310 SBSQ NF CARE HIGH MDM 45: CPT | Performed by: NURSE PRACTITIONER

## 2018-10-23 NOTE — PROGRESS NOTES
"Clinic Care Coordination Contact  Care Coordination Transition Communication    Referral Source: IP Handoff    Clinical Data: Patient was hospitalized at [facility]  10/14/18 to 10/22/18  with diagnosis of Right third toe infection status post amputation, Sepsis secondary to toe infection.     Per CTS handoff, patient lives alone with spouse and is her primary care giver.  Spouse has hx of Sjogren's Neuropathy an d has been bed bound for last 3 yrs.  Patient does not have any additional help in the home.  [facility].   May benefit from additional resources when patient returns home.  RN CC will update SW CC.     Pt reported that he is the primary caregiver for his wife Monique, who has Sjogren's Neuropathy, and has been bed bound for 3 years. Pt would leave food (bagels, fruit, salads, sandwiches, etc.) by his wife's bed when he would go to work for the day. Pt's wife uses depends and bed pads for toileting, with a garbage next to her bed to dispose of them. Per pt's report, Monique uses wipes to \"clean what she can\" to bathe. She uses ointment as needed for bed sores.     Transition to Facility:              Facility Name: [facility]              Contact name and phone number/fax:  [phone]    Plan: RN/SW Care Coordinator will await notification from facility staff informing RN/SW Care Coordinator of patient's discharge plans/needs. RN/SW Care Coordinator will review chart and outreach to facility staff every 4 weeks and as needed.     Audrey Ignacio RN-BSN   Care Coordination  Phone:  [phone]  Email: [email or web address]        "

## 2018-10-23 NOTE — LETTER
10/23/2018        RE: Lance Ibrahim  [address]  [place] [address]        [facility]  PRIMARY CARE PROVIDER AND CLINIC:  Anh Spivey [address] [facility] / [place] [address]  Chief Complaint   Patient presents with     Hospital F/U     [facility] Record Number:  [id number]  Place of Service where encounter took place:  [facility] ([facility]) [[id number]]    HPI:    Lance Ibrahim is a 73 year old  (1945),admitted to the above facility from  [facility].  Hospital stay 10/14/18 through 10/22/18.  Admitted to this facility for  rehab, medical management and nursing care.  HPI information obtained from: facility chart records, facility staff, patient report and [facility] chart review.   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 However at time of presentation with worsening cellulitis and redness and signs of sepsis. Imaging showed osteomyelitis. S/p amputation of third toe on 10/15.    Cultures came back positive for MSSA. Was followed by ID during hospitalization. Developed drug reaction to ancef. Now on minocycline. To complete 10 day course on 10/31. Dressing to stay in place until follow up. To wear boot when up. Follow up with podiatry in 1 week. Afebrile since admission    Urticaria secondary to drug reaction  Developed urticaria and drug reaction to ancef. Was started on prednisone for the reaction. He reports that the rash is improved. Is now on minocycline as above.    Gout of knee, unspecified cause, unspecified chronicity, unspecified laterality  Osteoarthritis of both knees, unspecified osteoarthritis type  Patient seen by orthopedics during hospitalization. His knees were tapped and crystals were  found. Is on prednisone for treatment. Also with history of OA. On tramadol PRN (which he has been taking during the day), oxycodone PRN (he takes before bed), voltaren gel QID. Follow up with ortho outpatient.    Today he tells me he continues to have significant pain on his knees. He tells me he can barely move them due to pain. Is interested in scheduling pain medications.     Essential hypertension  Continues on PTA carvedilol, lisinopril.    BP: 131-157/60-81 mmHg  P: 91-92 bpm    Type 2 diabetes mellitus with other circulatory complication, without long-term current use of insulin (H)  PTA metformin BID. Now on sliding scale insulin with meals and at bedtime since on prednisone. On consistent carb diet.  Recent Blood Sugars:  10/23/2018 07:57   Blood Sugar: 184 mg/dL  10/22/2018 21:42   Blood Sugar: 356 mg/dL    Lab Results   Component Value Date    A1C 6.4 10/17/2018    A1C 6.7 05/01/2018    A1C 6.3 10/07/2017    A1C 6.3 05/15/2017    A1C 5.6 10/12/2016     Anemia, unspecified type  Baseline hgb 11-12. Is on iron supplement.  Hemoglobin   Date Value Ref Range Status   10/22/2018 11.1 (L) 13.3 - 17.7 g/dL Final   10/21/2018 10.5 (L) 13.3 - 17.7 g/dL Final     Physical deconditioning  Here for therapies. Lives with his wife in a home. Prior to hospitalization he tells me he was not using an assistive device.     From hospital discharge summary (in italics):  Hospital Course      Lance Ibrahim was admitted on 10/14/2018.  73-year-old male with past medical history significant for diabetes mellitus, recurrent lower extremity infection, recent hospitalization for osteomyelitis, presented with left lower extremity infection.  On presentation patient had fever chills tachycardia leukocytosis and elevated lactic acid levels.  He was admitted for sepsis.  Patient was seen by podiatry and underwent debridement along with third toe amputation on 10/16/2018.  Postoperatively developed shortness of breath with significant  hypoxia blood cultures positive for MSSA-.  ID was consulted and patient was started on Ancef.  However after several days of being on as Ancef patient developed urticaria and drug reaction.  Was switched to minocycline patient is being discharged on 10 days of minocycline.  Also during hospitalization patient complained of bilateral knee pain and swelling.  Orthopedic surgery was consulted.  Both knees were aspirated showed presence of crystals concerning for gout.  He was started on prednisone short course both treatment for acute gout.  Patient still requires supplemental O2.       Following problems were addressed during his hospitalization:     Sepsis secondary to left third toe osteomyelitis  --Status post amputation  --Initially treated with Ancef now discharged on minocycline  --Improved with IV fluids antibiotics and surgical intervention  --Postop day #5.  Transfer to transitional care unit for rehab  --Blood cultures positive for MSSA sensitive to miocycline  --Postoperatively continues to require 1 L of supplemental O2 wean as able     Acute drug reaction  --Patient developed rash involving his back and upper extremity.  Rash consistent with drug reaction and urticaria  --Likely secondary to Ancef  --Discontinued antibiotic treated with steroids and Benadryl     Acute gout right knee and osteoarthritis bilaterally  --Bilateral knee pain secondary to gout new diagnosis.  --Is being treated with prednisone for 5 days  --Has clinical improvement  --Will need rehab  --Patient to follow-up with orthopedic surgery as outpatient for topical steroid injections     Acute renal failure on admission  --Resolved after initial hydration     Hypertension  --Continued on lisinopril     Diabetes mellitus type 2  --Prior to admission was on metformin thousand milligrams twice daily  --During hospitalization he also required sliding scale insulin.  Will need to continue sliding scale insulin especially now that patient is  on prednisone for the next 5 days.       CODE STATUS/ADVANCE DIRECTIVES DISCUSSION:   CPR/Full code   Patient's living condition: lives with spouse    ALLERGIES:Penicillins; Sulfa drugs; and Ancef [cefazolin]  PAST MEDICAL HISTORY:  has a past medical history of Chronic rhinitis; Diabetes mellitus (H); HTN (hypertension); Hypertrophy of prostate with urinary obstruction and other lower urinary tract symptoms (LUTS); TIA (transient ischaemic attack) (1993); and Unspecified transient cerebral ischemia (1993). He also has no past medical history of Basal cell carcinoma; Complication of anesthesia; Malignant hyperthermia; or Squamous cell carcinoma.  PAST SURGICAL HISTORY:  has a past surgical history that includes NONSPECIFIC PROCEDURE (1985); NONSPECIFIC PROCEDURE (1987); NONSPECIFIC PROCEDURE; Colonoscopy (3/9/2013); hernia repair (7/13/2004); Foot surgery (4/2013); Eye surgery (03/13/2017); and Amputate toe(s) (Left, 10/15/2018).  FAMILY HISTORY: family history includes Alcohol/Drug in his paternal grandfather; Cancer in his father; Diabetes in his maternal grandfather; Family History Negative in his mother.  SOCIAL HISTORY:  reports that he quit smoking about 25 years ago. He has never used smokeless tobacco. He reports that he drinks alcohol. He reports that he does not use illicit drugs.    Post Discharge Medication Reconciliation Status: discharge medications reconciled and changed, per note/orders (see AVS).  Current Outpatient Prescriptions   Medication Sig Dispense Refill     Acetaminophen (TYLENOL PO) Take 1,000 mg by mouth 3 times daily       aspirin 325 MG tablet Take 325 mg by mouth daily        blood glucose monitoring (ACCU-CHEK SMARTVIEW) test strip USE TO TEST TWICE DAILY 200 strip 1     carvedilol (COREG) 3.125 MG tablet Take 1 tablet (3.125 mg) by mouth 2 times daily (with meals) 60 tablet 0     diclofenac (VOLTAREN) 1 % GEL topical gel Place 2 g onto the skin 4 times daily Leg and knee 1 Tube 0      ferrous gluconate (FERGON) 324 (38 FE) MG tablet Take 1 tablet by mouth daily.       finasteride (PROSCAR) 5 MG tablet Take 5 mg by mouth daily.       fluticasone (FLONASE) 50 MCG/ACT spray Spray 1 spray into both nostrils 2 times daily       gabapentin (NEURONTIN) 100 MG capsule Take 1 capsule (100 mg) by mouth 2 times daily 60 capsule 0     gabapentin (NEURONTIN) 100 MG capsule Take 2 capsules (200 mg) by mouth At Bedtime 60 capsule 0     insulin aspart (NOVOLOG PEN) 100 UNIT/ML injection Inject 1-10 Units Subcutaneous 3 times daily (before meals)       insulin aspart (NOVOLOG PEN) 100 UNIT/ML injection Inject 1-7 Units Subcutaneous At Bedtime       lidocaine (LMX4) 4 % CREA cream Apply topically every hour as needed for pain (with VAD insertion or accessing implanted port.) 1 Tube 0     lisinopril (PRINIVIL/ZESTRIL) 10 MG tablet TAKE 2 TABLETS (20 MG) BY MOUTH DAILY 180 tablet 3     loratadine (CLARITIN) 10 MG tablet Take 10 mg by mouth At Bedtime       metFORMIN (GLUCOPHAGE) 1000 MG tablet TAKE 1 TABLET (1,000 MG) BY MOUTH 2 TIMES DAILY (WITH MEALS) **DUE FOR APRIL APPT/LABS. CALL MD** 180 tablet 1     minocycline (MINOCIN/DYNACIN) 100 MG capsule Take 1 capsule (100 mg) by mouth every 12 hours for 9 days 18 capsule 0     omeprazole (PRILOSEC) 20 MG CR capsule Take 1 capsule (20 mg) by mouth every morning (before breakfast) 30 capsule 0     oxyCODONE IR (ROXICODONE) 5 MG tablet Take 0.5 tablets (2.5 mg) by mouth every 4 hours as needed for severe pain (Patient taking differently: Take 2.5 mg by mouth every 4 hours as needed for severe pain Scheduled at 8PM and once PRN overnight) 6 tablet 0     predniSONE (DELTASONE) 20 MG tablet Take 2 tablets (40 mg) by mouth daily for 3 days 6 tablet 0     tamsulosin (FLOMAX) 0.4 MG 24 hr capsule Take 1 capsule by mouth daily.       traMADol (ULTRAM) 50 MG tablet Take 1 tablet (50 mg) by mouth every 6 hours as needed for moderate pain (Patient taking differently: Take 50 mg by  mouth every 6 hours as needed for moderate pain Scheduled at 8AM and 2PM) 10 tablet 0       ROS:  10 point ROS of systems including Constitutional, Eyes, Respiratory, Cardiovascular, Gastroenterology, Genitourinary, Integumentary, Musculoskeletal, Neurological, Psychiatric were all negative except for pertinent positives noted in my HPI.    Exam:  /68  Pulse 92  Temp 97.9  F (36.6  C)  Resp 17  Ht 6' (1.829 m)  Wt 250 lb (113.4 kg)  SpO2 93%  BMI 33.91 kg/m2   GENERAL APPEARANCE:  Alert, pleasant and cooperative, oriented x 4, sitting on edge of bed on exam today  EYES:  EOM, lids, pupils and irises normal, sclera clear and conjunctiva normal, no discharge or mattering on lids or lashes noted  ENT:  Mouth normal, moist mucous membranes, nose without drainage or crusting, external ears without lesions  RESP:  respiratory effort normal, no respiratory distress, Lung sounds diminished, patient is on RA  CV: auscultation of heart done, rate and rhythm regular. generalized edema  ABDOMEN:  normal bowel sounds, soft, nontender, no grimacing or guarding with palpation.  M/S:  with tenderness to bilateral knees; able to move all extremities but limited movement to knees due to pain.   SKIN:  Inspection and palpation of skin and subcutaneous tissue: skin warm, dry without rashes, left third toe bandaged- no drainage noted through dressing, urticarial rash to bilateral arms and back  NEURO: cranial nerves 2-12 grossly intact, no facial asymmetry, no speech deficits and able to follow directions, moves all extremities symmetrically  PSYCH:  insight and judgement intact, memory intact, affect and mood normal      Lab/Diagnostic data:     CBC RESULTS:   Recent Labs   Lab Test  10/22/18   0628  10/21/18   0619   WBC  14.5*  13.2*   RBC  3.92*  3.70*   HGB  11.1*  10.5*   HCT  36.0*  33.7*   MCV  92  91   MCH  28.3  28.4   MCHC  30.8*  31.2*   RDW  15.7*  15.8*   PLT  488*  418       Last Basic Metabolic  Panel:  Recent Labs   Lab Test  10/22/18   0628  10/21/18   0619   NA  133  134   POTASSIUM  4.9  4.7   CHLORIDE  99  97   SHANNAN  9.4  8.9   CO2  27  29   BUN  30  22   CR  1.01  1.11   GLC  166*  154*       Lab Results   Component Value Date    A1C 6.4 10/17/2018    A1C 6.7 05/01/2018       ASSESSMENT/PLAN:  (A41.01) Sepsis due to methicillin susceptible Staphylococcus aureus (H)  (primary encounter diagnosis)  (M86.9) Osteomyelitis of left foot, unspecified type (H)  (Z89.422) Status post amputation of toe of left foot (H)  Comment: Acute, afebrile since at U  Plan: Continue minocycline through 10/31. Keep dressing in place until follow up next week. Follow up with podiatry next week. To wear boot when up. Incentive spirometer q2h when awake.     (L50.9) Urticaria secondary to drug reaction  Comment: Acute, improving  Plan: Continue prednisone through 10/25. Now on minocycline as above    (M10.9) Gout of knee, unspecified cause, unspecified chronicity, unspecified laterality  (M17.0) Osteoarthritis of both knees, unspecified osteoarthritis type  Comment: Acute, continues to have significant pain  Plan: Schedule tylenol TID. Schedule tramadol at 8AM and 2PM. Schedule oxycodone at 8PM and ok for one PRN dose overnight if not within 4 hours of scheduled medications. Continue prednisone through 10/25. Follow up with orthopedics outpatient.    (I10) Essential hypertension  Comment: Chronic controlled since admission  Plan: Continue medications as above. BMP 10/30. VS per TCU policy     (E11.59) Type 2 diabetes mellitus with other circulatory complication, without long-term current use of insulin (H)  Comment: Chronic, a1c with good control. Some elevated BS since on prednisone  Plan: Continue metformin. QID BS. Change sliding scale to house orders for novolog sliding scale with meals only. Discontinue sliding scale at at bedtime.     (D64.9) Anemia, unspecified type  Comment: Last hgb at baseline  Plan: CBC 10/30.  Monitor for s/sx of bleeding.       Total time spent with patient visit at the skilled nursing facility was 55 minutes including patient visit, review of past records and review of admission orders, medication reconcilation. Greater than 50% of total time spent with counseling and coordinating care due to sepsis, osteomyelitis, and other problems as noted above    Electronically signed by:  RANJAN Butt CNP                    Sincerely,        RANJAN Butt CNP

## 2018-10-23 NOTE — PLAN OF CARE
"Problem: Patient Care Overview  Goal: Plan of Care/Patient Progress Review  Physical Therapy Discharge Summary    Reason for therapy discharge:    Discharged to transitional care facility.    Progress towards therapy goal(s). See goals on Care Plan in [facility] electronic health record for goal details.  Goals not met.  Barriers to achieving goals:   limited tolerance for therapy and discharge from facility.  Pt made slow progress during hospitalization, initially wanted to return home but was unable to show sufficient progress with PT, last seen on 10/22:    \"Patient plan for discharge: TCU  Current status: Pt transfers supine to sit with supervision and indep with scooting to EOB. Pt able to sit on EOB with supervision to indep x 12 min. Pt unable to stand d/t pain in B knees L>R. Pt was able to perform knee ext on the R with minimal pain and had increase pain on the L with assist. Note toes on the L LE to increase in color ( purple) - pt transfers supine to sit with mod assist with B LEs. Placed pillows under L LE when back in bed.   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     Rationale for recommendations: Currently functioning well below baseline, will need ongoing skilled PT intervention to improve independence and safety with functional mobility skills prior to returning home.  Unlikely to reach sufficient mobility goals during hospitalization, but will continue to reassess.\"    Therapy recommendation(s):    Continued therapy is recommended.  Rationale/Recommendations:  Pt requires ongoing skilled PT intervention prior to returning home to improve ability to safely perform functional transfer skills and basic mobility needs..    **Pt not seen by discharging therapist on this date, note written based on previous treating therapist's notes and " recommendations.

## 2018-10-23 NOTE — PROGRESS NOTES
Transition Communication Hand-off for Care Transitions to Next Level of Care Provider    Name: Lance Ibrahim  : 1945  MRN #: [id number]  Primary Care Provider: Anh Spivey  Primary Care MD Name: Dr. Spivey  Primary Clinic: [address] [facility] [address]  Primary Care Clinic Name: [facility]  Reason for Hospitalization:  Cellulitis and abscess of leg [L03.119, L02.419]  Admit Date/Time: 10/14/2018  2:26 PM  Discharge Date: 10/23/2017  Payor Source: Payor: [employer] / Plan: [employer] OPEN ACCESS / Product Type: HMO /     Readmission Assessment Measure (RAMAKRISHNA) Risk Score/category: ELEVATED         Reason for Communication Hand-off Referral: Other 3rd admission in 6 months, non healing DM toe ulcer now s/p toe amp with recommnedations for TCU     Discharge Plan:TCU, [facility]   Discharge Needs Assessment:  Needs       Most Recent Value    Equipment Currently Used at Home none [owns 4ww, cane, crutches ]    Transportation Available van, wheelchair accessible [[employer], 10/22 @ 1630]    # of Referrals Placed by [facility] Transportation, Post Acute Facilities    Skilled Nursing Facility [facility] [phone], Fax: [phone]    PAS Number [id number]        Follow Up and recommended labs and tests       Follow up with skilled nursing physician.  The following labs/tests are recommended: CBC in 1-2 weeks   Follow-up with orthopedic surgery for bilateral osteoarthritis and new diagnosis of gout.   Follow-up with podiatry.  For post follow-up   Follow with primary care provider for diabetes management and monitor blood pressure and further evaluation for gout if necessary.   Wean supplemental O2   Monitor blood glucose especially while patient is on prednisone            Follow-up and recommended labs and tests       Follow up with Dr. Azevedo in 1 week from discharge from the hospital.  For APPOINTMENTS: [phone].  For questions or concerns: call: [phone]               Follow-up plan:  Future Appointments  Date Time Provider Department Center   10/23/2018 5:00 PM Keith BonillaRANJAN Ro CNP [facility]   10/24/2018 1:00 PM Minerva La MD [facility]       Any outstanding tests or procedures:    Procedures     Future Labs/Procedures    Oxygen - Nasal cannula     Comments:    1-2 Lpm by nasal cannula to keep O2 sats 92% or greater.  Wean as able          Referrals     Future Labs/Procedures    Occupational Therapy Adult Consult     Comments:    Evaluate and treat as clinically indicated.    Reason: Deconditioning    Physical Therapy Adult Consult     Comments:    Evaluate and treat as clinically indicated.    Reason: Postop            Key Recommendations: CTS following pt. 2/2 to 3 admissions in 6 months, potential for noncompliance. Pt is primary care provider of disabled wife whom is bedbound and has been for three years. Pt is currently working full time and per [facility] consult a [facility] report was filled for the wife. SEE [facility] notes 10/11/2018.   Pt is s/p toe amputation and then plan was to discharge home to continue to care for his wife, whom is being cared for by her ex  and her son at this time  During this stay pt developed increased pain in the knee and was below baseline functional status, it took some time to convince pt that he would need TCU, (even with current need to be erick lift) concerned about wife and work.    Pt did end up discharging to TCU.   Please see Discharge summary for details on post op occurrences.   Please follow pt closely upon return home from TCU to ensure successful transition home with mx specialities and medial complexities.    Estee Bunch    AVS/Discharge Summary is the source of truth; this is a helpful guide for improved communication of patient story

## 2018-10-23 NOTE — PROGRESS NOTES
[place] [facility]  PRIMARY CARE PROVIDER AND CLINIC:  Anh Spivey [address] [facility] / [facility] [address]  Chief Complaint   Patient presents with     Hospital F/U     [facility] Record Number:  [id number]  Place of Service where encounter took place:  [facility] ([facility]) [[id number]]    HPI:    Lance Ibrahim is a 73 year old  (1945),admitted to the above facility from  [facility].  Hospital stay 10/14/18 through 10/22/18.  Admitted to this facility for  rehab, medical management and nursing care.  HPI information obtained from: facility chart records, facility staff, patient report and [facility] chart review.   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 However at time of presentation with worsening cellulitis and redness and signs of sepsis. Imaging showed osteomyelitis. S/p amputation of third toe on 10/15.    Cultures came back positive for MSSA. Was followed by ID during hospitalization. Developed drug reaction to ancef. Now on minocycline. To complete 10 day course on 10/31. Dressing to stay in place until follow up. To wear boot when up. Follow up with podiatry in 1 week. Afebrile since admission    Urticaria secondary to drug reaction  Developed urticaria and drug reaction to ancef. Was started on prednisone for the reaction. He reports that the rash is improved. Is now on minocycline as above.    Gout of knee, unspecified cause, unspecified chronicity, unspecified laterality  Osteoarthritis of both knees, unspecified osteoarthritis type  Patient seen by orthopedics during hospitalization. His knees were tapped and crystals were found. Is on prednisone for treatment. Also with history of OA. On tramadol PRN (which he has  been taking during the day), oxycodone PRN (he takes before bed), voltaren gel QID. Follow up with ortho outpatient.    Today he tells me he continues to have significant pain on his knees. He tells me he can barely move them due to pain. Is interested in scheduling pain medications.     Essential hypertension  Continues on PTA carvedilol, lisinopril.    BP: 131-157/60-81 mmHg  P: 91-92 bpm    Type 2 diabetes mellitus with other circulatory complication, without long-term current use of insulin (H)  PTA metformin BID. Now on sliding scale insulin with meals and at bedtime since on prednisone. On consistent carb diet.  Recent Blood Sugars:  10/23/2018 07:57   Blood Sugar: 184 mg/dL  10/22/2018 21:42   Blood Sugar: 356 mg/dL    Lab Results   Component Value Date    A1C 6.4 10/17/2018    A1C 6.7 05/01/2018    A1C 6.3 10/07/2017    A1C 6.3 05/15/2017    A1C 5.6 10/12/2016     Anemia, unspecified type  Baseline hgb 11-12. Is on iron supplement.  Hemoglobin   Date Value Ref Range Status   10/22/2018 11.1 (L) 13.3 - 17.7 g/dL Final   10/21/2018 10.5 (L) 13.3 - 17.7 g/dL Final     Physical deconditioning  Here for therapies. Lives with his wife in a home. Prior to hospitalization he tells me he was not using an assistive device.     From hospital discharge summary (in italics):  Hospital Course      Lance Ibrahim was admitted on 10/14/2018.  73-year-old male with past medical history significant for diabetes mellitus, recurrent lower extremity infection, recent hospitalization for osteomyelitis, presented with left lower extremity infection.  On presentation patient had fever chills tachycardia leukocytosis and elevated lactic acid levels.  He was admitted for sepsis.  Patient was seen by podiatry and underwent debridement along with third toe amputation on 10/16/2018.  Postoperatively developed shortness of breath with significant hypoxia blood cultures positive for MSSA-.  ID was consulted and patient was started on Ancef.   However after several days of being on as Ancef patient developed urticaria and drug reaction.  Was switched to minocycline patient is being discharged on 10 days of minocycline.  Also during hospitalization patient complained of bilateral knee pain and swelling.  Orthopedic surgery was consulted.  Both knees were aspirated showed presence of crystals concerning for gout.  He was started on prednisone short course both treatment for acute gout.  Patient still requires supplemental O2.       Following problems were addressed during his hospitalization:     Sepsis secondary to left third toe osteomyelitis  --Status post amputation  --Initially treated with Ancef now discharged on minocycline  --Improved with IV fluids antibiotics and surgical intervention  --Postop day #5.  Transfer to transitional care unit for rehab  --Blood cultures positive for MSSA sensitive to miocycline  --Postoperatively continues to require 1 L of supplemental O2 wean as able     Acute drug reaction  --Patient developed rash involving his back and upper extremity.  Rash consistent with drug reaction and urticaria  --Likely secondary to Ancef  --Discontinued antibiotic treated with steroids and Benadryl     Acute gout right knee and osteoarthritis bilaterally  --Bilateral knee pain secondary to gout new diagnosis.  --Is being treated with prednisone for 5 days  --Has clinical improvement  --Will need rehab  --Patient to follow-up with orthopedic surgery as outpatient for topical steroid injections     Acute renal failure on admission  --Resolved after initial hydration     Hypertension  --Continued on lisinopril     Diabetes mellitus type 2  --Prior to admission was on metformin thousand milligrams twice daily  --During hospitalization he also required sliding scale insulin.  Will need to continue sliding scale insulin especially now that patient is on prednisone for the next 5 days.       CODE STATUS/ADVANCE DIRECTIVES DISCUSSION:   CPR/Full  code   Patient's living condition: lives with spouse    ALLERGIES:Penicillins; Sulfa drugs; and Ancef [cefazolin]  PAST MEDICAL HISTORY:  has a past medical history of Chronic rhinitis; Diabetes mellitus (H); HTN (hypertension); Hypertrophy of prostate with urinary obstruction and other lower urinary tract symptoms (LUTS); TIA (transient ischaemic attack) (1993); and Unspecified transient cerebral ischemia (1993). He also has no past medical history of Basal cell carcinoma; Complication of anesthesia; Malignant hyperthermia; or Squamous cell carcinoma.  PAST SURGICAL HISTORY:  has a past surgical history that includes NONSPECIFIC PROCEDURE (1985); NONSPECIFIC PROCEDURE (1987); NONSPECIFIC PROCEDURE; Colonoscopy (3/9/2013); hernia repair (7/13/2004); Foot surgery (4/2013); Eye surgery (03/13/2017); and Amputate toe(s) (Left, 10/15/2018).  FAMILY HISTORY: family history includes Alcohol/Drug in his paternal grandfather; Cancer in his father; Diabetes in his maternal grandfather; Family History Negative in his mother.  SOCIAL HISTORY:  reports that he quit smoking about 25 years ago. He has never used smokeless tobacco. He reports that he drinks alcohol. He reports that he does not use illicit drugs.    Post Discharge Medication Reconciliation Status: discharge medications reconciled and changed, per note/orders (see AVS).  Current Outpatient Prescriptions   Medication Sig Dispense Refill     Acetaminophen (TYLENOL PO) Take 1,000 mg by mouth 3 times daily       aspirin 325 MG tablet Take 325 mg by mouth daily        blood glucose monitoring (ACCU-CHEK SMARTVIEW) test strip USE TO TEST TWICE DAILY 200 strip 1     carvedilol (COREG) 3.125 MG tablet Take 1 tablet (3.125 mg) by mouth 2 times daily (with meals) 60 tablet 0     diclofenac (VOLTAREN) 1 % GEL topical gel Place 2 g onto the skin 4 times daily Leg and knee 1 Tube 0     ferrous gluconate (FERGON) 324 (38 FE) MG tablet Take 1 tablet by mouth daily.        finasteride (PROSCAR) 5 MG tablet Take 5 mg by mouth daily.       fluticasone (FLONASE) 50 MCG/ACT spray Spray 1 spray into both nostrils 2 times daily       gabapentin (NEURONTIN) 100 MG capsule Take 1 capsule (100 mg) by mouth 2 times daily 60 capsule 0     gabapentin (NEURONTIN) 100 MG capsule Take 2 capsules (200 mg) by mouth At Bedtime 60 capsule 0     insulin aspart (NOVOLOG PEN) 100 UNIT/ML injection Inject 1-10 Units Subcutaneous 3 times daily (before meals)       insulin aspart (NOVOLOG PEN) 100 UNIT/ML injection Inject 1-7 Units Subcutaneous At Bedtime       lidocaine (LMX4) 4 % CREA cream Apply topically every hour as needed for pain (with VAD insertion or accessing implanted port.) 1 Tube 0     lisinopril (PRINIVIL/ZESTRIL) 10 MG tablet TAKE 2 TABLETS (20 MG) BY MOUTH DAILY 180 tablet 3     loratadine (CLARITIN) 10 MG tablet Take 10 mg by mouth At Bedtime       metFORMIN (GLUCOPHAGE) 1000 MG tablet TAKE 1 TABLET (1,000 MG) BY MOUTH 2 TIMES DAILY (WITH MEALS) **DUE FOR APRIL APPT/LABS. CALL MD** 180 tablet 1     minocycline (MINOCIN/DYNACIN) 100 MG capsule Take 1 capsule (100 mg) by mouth every 12 hours for 9 days 18 capsule 0     omeprazole (PRILOSEC) 20 MG CR capsule Take 1 capsule (20 mg) by mouth every morning (before breakfast) 30 capsule 0     oxyCODONE IR (ROXICODONE) 5 MG tablet Take 0.5 tablets (2.5 mg) by mouth every 4 hours as needed for severe pain (Patient taking differently: Take 2.5 mg by mouth every 4 hours as needed for severe pain Scheduled at 8PM and once PRN overnight) 6 tablet 0     predniSONE (DELTASONE) 20 MG tablet Take 2 tablets (40 mg) by mouth daily for 3 days 6 tablet 0     tamsulosin (FLOMAX) 0.4 MG 24 hr capsule Take 1 capsule by mouth daily.       traMADol (ULTRAM) 50 MG tablet Take 1 tablet (50 mg) by mouth every 6 hours as needed for moderate pain (Patient taking differently: Take 50 mg by mouth every 6 hours as needed for moderate pain Scheduled at 8AM and 2PM) 10 tablet  0       ROS:  10 point ROS of systems including Constitutional, Eyes, Respiratory, Cardiovascular, Gastroenterology, Genitourinary, Integumentary, Musculoskeletal, Neurological, Psychiatric were all negative except for pertinent positives noted in my HPI.    Exam:  /68  Pulse 92  Temp 97.9  F (36.6  C)  Resp 17  Ht 6' (1.829 m)  Wt 250 lb (113.4 kg)  SpO2 93%  BMI 33.91 kg/m2   GENERAL APPEARANCE:  Alert, pleasant and cooperative, oriented x 4, sitting on edge of bed on exam today  EYES:  EOM, lids, pupils and irises normal, sclera clear and conjunctiva normal, no discharge or mattering on lids or lashes noted  ENT:  Mouth normal, moist mucous membranes, nose without drainage or crusting, external ears without lesions  RESP:  respiratory effort normal, no respiratory distress, Lung sounds diminished, patient is on RA  CV: auscultation of heart done, rate and rhythm regular. generalized edema  ABDOMEN:  normal bowel sounds, soft, nontender, no grimacing or guarding with palpation.  M/S:  with tenderness to bilateral knees; able to move all extremities but limited movement to knees due to pain.   SKIN:  Inspection and palpation of skin and subcutaneous tissue: skin warm, dry without rashes, left third toe bandaged- no drainage noted through dressing, urticarial rash to bilateral arms and back  NEURO: cranial nerves 2-12 grossly intact, no facial asymmetry, no speech deficits and able to follow directions, moves all extremities symmetrically  PSYCH:  insight and judgement intact, memory intact, affect and mood normal      Lab/Diagnostic data:     CBC RESULTS:   Recent Labs   Lab Test  10/22/18   0628  10/21/18   0619   WBC  14.5*  13.2*   RBC  3.92*  3.70*   HGB  11.1*  10.5*   HCT  36.0*  33.7*   MCV  92  91   MCH  28.3  28.4   MCHC  30.8*  31.2*   RDW  15.7*  15.8*   PLT  488*  418       Last Basic Metabolic Panel:  Recent Labs   Lab Test  10/22/18   0628  10/21/18   0619   NA  133  134   POTASSIUM  4.9   4.7   CHLORIDE  99  97   SHANNAN  9.4  8.9   CO2  27  29   BUN  30  22   CR  1.01  1.11   GLC  166*  154*       Lab Results   Component Value Date    A1C 6.4 10/17/2018    A1C 6.7 05/01/2018       ASSESSMENT/PLAN:  (A41.01) Sepsis due to methicillin susceptible Staphylococcus aureus (H)  (primary encounter diagnosis)  (M86.9) Osteomyelitis of left foot, unspecified type (H)  (Z89.422) 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 Keep dressing in place until follow up next week. Follow up with podiatry next week. To wear boot when up. Incentive spirometer q2h when awake.     (L50.9) Urticaria secondary to drug reaction  Comment: Acute, improving  Plan: Continue prednisone through 10/25. Now on minocycline as above    (M10.9) Gout of knee, unspecified cause, unspecified chronicity, unspecified laterality  (M17.0) Osteoarthritis of both knees, unspecified osteoarthritis type  Comment: Acute, continues to have significant pain  Plan: Schedule tylenol TID. Schedule tramadol at 8AM and 2PM. Schedule oxycodone at 8PM and ok for one PRN dose overnight if not within 4 hours of scheduled medications. Continue prednisone through 10/25. Follow up with orthopedics outpatient.    (I10) Essential hypertension  Comment: Chronic controlled since admission  Plan: Continue medications as above. BMP 10/30. VS per TCU policy     (E11.59) Type 2 diabetes mellitus with other circulatory complication, without long-term current use of insulin (H)  Comment: Chronic, a1c with good control. Some elevated BS since on prednisone  Plan: Continue metformin. QID BS. Change sliding scale to house orders for novolog sliding scale with meals only. Discontinue sliding scale at at bedtime.     (D64.9) Anemia, unspecified type  Comment: Last hgb at baseline  Plan: CBC 10/30. Monitor for s/sx of bleeding.     (R53.81) Physical deconditioning  Comment: Acute  Plan: Encourage  participation in physical therapy/occupational therapy for strengthening and deconditioning. Discharge planning per their recommendation. Social work to assist with d/c planning.        Total time spent with patient visit at the skilled nursing facility was 55 minutes including patient visit, review of past records and review of admission orders, medication reconcilation. Greater than 50% of total time spent with counseling and coordinating care due to sepsis, osteomyelitis, and other problems as noted above    Electronically signed by:  RANJAN Butt CNP

## 2018-10-23 NOTE — PROGRESS NOTES
Transition Communication Hand-off for Care Transitions to Next Level of Care Provider    Name: Lance Ibrahim  : 1945  MRN #: [id number]  Primary Care Provider: Anh Spivey  Primary Care MD Name: Dr. Spivey  Primary Clinic: [address] [facility] [address]  Primary Care Clinic Name: [facility]  Reason for Hospitalization:  Cellulitis and abscess of leg [L03.119, L02.419]  Admit Date/Time: 10/14/2018  2:26 PM  Discharge Date: 10/23/2017  Payor Source: Payor: [employer] / Plan: [employer] OPEN ACCESS / Product Type: HMO /     Readmission Assessment Measure (RAMAKRISHNA) Risk Score/category: ELEVATED         Reason for Communication Hand-off Referral: Other 3rd admission in 6 months, non healing DM toe ulcer now s/p toe amp with recommnedations for TCU     Discharge Plan:TCU, [facility]   Discharge Needs Assessment:  Needs       Most Recent Value    Equipment Currently Used at Home none [owns 4ww, cane, crutches ]    Transportation Available van, wheelchair accessible [[employer], 10/22 @ 1630]    # of Referrals Placed by [facility] Transportation, Post Acute Facilities    Skilled Nursing Facility [facility] [phone], Fax: [phone]    PAS Number [id number]        Follow Up and recommended labs and tests       Follow up with custodial physician.  The following labs/tests are recommended: CBC in 1-2 weeks   Follow-up with orthopedic surgery for bilateral osteoarthritis and new diagnosis of gout.   Follow-up with podiatry.  For post follow-up   Follow with primary care provider for diabetes management and monitor blood pressure and further evaluation for gout if necessary.   Wean supplemental O2   Monitor blood glucose especially while patient is on prednisone            Follow-up and recommended labs and tests       Follow up with Dr. Azevedo in 1 week from discharge from the hospital.  For APPOINTMENTS: [phone].  For questions or concerns: call: [phone]               Follow-up plan:  Future Appointments  Date Time Provider Department Center   10/23/2018 5:00 PM Keith BonillaRANJAN Ro CNP [facility]   10/24/2018 1:00 PM Minerva La MD [facility]       Any outstanding tests or procedures:    Procedures     Future Labs/Procedures    Oxygen - Nasal cannula     Comments:    1-2 Lpm by nasal cannula to keep O2 sats 92% or greater.  Wean as able          Referrals     Future Labs/Procedures    Occupational Therapy Adult Consult     Comments:    Evaluate and treat as clinically indicated.    Reason: Deconditioning    Physical Therapy Adult Consult     Comments:    Evaluate and treat as clinically indicated.    Reason: Postop            Key Recommendations: CTS following pt. 2/2 to 3 admissions in 6 months, potential for noncompliance. Pt is primary care provider of disabled wife whom is bedbound and has been for three years. Pt is currently working full time and per [facility] consult a [facility] report was filled for the wife. SEE [facility] notes 10/11/2018.   Pt is s/p toe amputation and then plan was to discharge home to continue to care for his wife, whom is being cared for by her ex  and her son at this time  During this stay pt developed increased pain in the knee and was below baseline functional status, it took some time to convince pt that he would need TCU, (even with current need to be erick lift) concerned about wife and work.    Pt did end up discharging to TCU.   Please see Discharge summary for details on post op occurrences.   Please follow pt closely upon return home from TCU to ensure successful transition home with mx specialities and medial complexities.    Estee Bunch    AVS/Discharge Summary is the source of truth; this is a helpful guide for improved communication of patient story

## 2018-10-23 NOTE — PROGRESS NOTES
Patient discharged to TCU with [facility] transport. Packet printed and given to . Vitals appropriate for discharge.

## 2018-10-24 ENCOUNTER — NURSING HOME VISIT (OUTPATIENT)
Dept: GERIATRICS | Facility: CLINIC | Age: 73
End: 2018-10-24
Payer: COMMERCIAL

## 2018-10-24 VITALS
OXYGEN SATURATION: 96 % | HEART RATE: 90 BPM | RESPIRATION RATE: 16 BRPM | HEIGHT: 72 IN | TEMPERATURE: 97.5 F | DIASTOLIC BLOOD PRESSURE: 71 MMHG | BODY MASS INDEX: 33.86 KG/M2 | WEIGHT: 250 LBS | SYSTOLIC BLOOD PRESSURE: 125 MMHG

## 2018-10-24 DIAGNOSIS — R53.81 PHYSICAL DECONDITIONING: ICD-10-CM

## 2018-10-24 DIAGNOSIS — L08.9 DIABETIC FOOT INFECTION (H): Primary | ICD-10-CM

## 2018-10-24 DIAGNOSIS — N40.0 BENIGN PROSTATIC HYPERPLASIA, UNSPECIFIED WHETHER LOWER URINARY TRACT SYMPTOMS PRESENT: ICD-10-CM

## 2018-10-24 DIAGNOSIS — Z86.73 HISTORY OF TIA (TRANSIENT ISCHEMIC ATTACK) AND STROKE: ICD-10-CM

## 2018-10-24 DIAGNOSIS — E11.00 TYPE 2 DIABETES MELLITUS WITH HYPEROSMOLARITY WITHOUT COMA, WITH LONG-TERM CURRENT USE OF INSULIN (H): ICD-10-CM

## 2018-10-24 DIAGNOSIS — Z79.4 TYPE 2 DIABETES MELLITUS WITH HYPEROSMOLARITY WITHOUT COMA, WITH LONG-TERM CURRENT USE OF INSULIN (H): ICD-10-CM

## 2018-10-24 DIAGNOSIS — E11.628 DIABETIC FOOT INFECTION (H): Primary | ICD-10-CM

## 2018-10-24 DIAGNOSIS — I10 ESSENTIAL HYPERTENSION: ICD-10-CM

## 2018-10-24 DIAGNOSIS — M10.9 ACUTE GOUTY ARTHRITIS: ICD-10-CM

## 2018-10-24 PROCEDURE — 99306 1ST NF CARE HIGH MDM 50: CPT | Performed by: INTERNAL MEDICINE

## 2018-10-24 NOTE — PROGRESS NOTES
PRIMARY CARE PROVIDER AND CLINIC RESPONSIBLE:  Anh Spivey, [address] [facility] / [facility] [address]        ADMISSION HISTORY AND PHYSICAL EXAMINATION     Chief Complaint   Patient presents with     Hospital F/U         HISTORY OF PRESENT ILLNESS:  73 year old male, (1945), [facility] for continuation of medical care and rehab.    Pt admitted [facility] 10/14 to 10/22 for cellulitis and OM of L foot. S/p amputation of L 3rd toe 10/15. Hospital course complicated by drug reaction and gout both knees.    Pt states the knees are painful when bearing weight. Does have Hx of OA of knees and had intra-articular steroids. Pain is not as bad as in the hospital. Rash is receding. No fevers/chills/chest pain.    Please see Hilary Bonilla 's CNP admit noted dated 10/23 for details of admission, past medical history, family history, allergies, medication list, social history and other details pertinent with this admission. Hospital admission and dc summary reviewed.      Past Medical History:   Diagnosis Date     Chronic rhinitis      Diabetes mellitus (H)      HTN (hypertension)      Hypertrophy of prostate with urinary obstruction and other lower urinary tract symptoms (LUTS)      TIA (transient ischaemic attack) 1993     Unspecified transient cerebral ischemia 1993    No definite source found       Past Surgical History:   Procedure Laterality Date     AMPUTATE TOE(S) Left 10/15/2018    Procedure: AMPUTATE TOE(S);  Left third toe amputation;  Surgeon: Ayaka Azevedo DPM, Podiatry/Foot and Ankle Surgery;  Location: [facility] OR     C NONSPECIFIC PROCEDURE  1985    Diastasis recti repair     C NONSPECIFIC PROCEDURE  1987    Ventral hernia repair     C NONSPECIFIC PROCEDURE      ORIF of left shoulder     COLONOSCOPY  3/9/2013    Procedure: COLONOSCOPY;  COLONOSCOPY;  Surgeon: Chau Hogan MD;  Location:  GI     EYE SURGERY  03/13/2017    left eye     FOOT SURGERY  4/2013    cyst removal      HERNIA  REPAIR  7/13/2004    ventral        Current Outpatient Prescriptions   Medication Sig     aspirin 325 MG tablet Take 325 mg by mouth daily      blood glucose monitoring (ACCU-CHEK SMARTVIEW) test strip USE TO TEST TWICE DAILY     carvedilol (COREG) 3.125 MG tablet Take 1 tablet (3.125 mg) by mouth 2 times daily (with meals)     diclofenac (VOLTAREN) 1 % GEL topical gel Place 2 g onto the skin 4 times daily Leg and knee     ferrous gluconate (FERGON) 324 (38 FE) MG tablet Take 1 tablet by mouth daily.     finasteride (PROSCAR) 5 MG tablet Take 5 mg by mouth daily.     fluticasone (FLONASE) 50 MCG/ACT spray Spray 1 spray into both nostrils 2 times daily     gabapentin (NEURONTIN) 100 MG capsule Take 1 capsule (100 mg) by mouth 2 times daily     gabapentin (NEURONTIN) 100 MG capsule Take 2 capsules (200 mg) by mouth At Bedtime     insulin aspart (NOVOLOG PEN) 100 UNIT/ML injection Inject 1-10 Units Subcutaneous 3 times daily (before meals)     insulin aspart (NOVOLOG PEN) 100 UNIT/ML injection Inject 1-7 Units Subcutaneous At Bedtime     lidocaine (LMX4) 4 % CREA cream Apply topically every hour as needed for pain (with VAD insertion or accessing implanted port.)     lisinopril (PRINIVIL/ZESTRIL) 10 MG tablet TAKE 2 TABLETS (20 MG) BY MOUTH DAILY     loratadine (CLARITIN) 10 MG tablet Take 10 mg by mouth At Bedtime     metFORMIN (GLUCOPHAGE) 1000 MG tablet TAKE 1 TABLET (1,000 MG) BY MOUTH 2 TIMES DAILY (WITH MEALS) **DUE FOR APRIL APPT/LABS. CALL MD**     minocycline (MINOCIN/DYNACIN) 100 MG capsule Take 1 capsule (100 mg) by mouth every 12 hours for 9 days     omeprazole (PRILOSEC) 20 MG CR capsule Take 1 capsule (20 mg) by mouth every morning (before breakfast)     oxyCODONE IR (ROXICODONE) 5 MG tablet Take 0.5 tablets (2.5 mg) by mouth every 4 hours as needed for severe pain     predniSONE (DELTASONE) 20 MG tablet Take 2 tablets (40 mg) by mouth daily for 3 days     tamsulosin (FLOMAX) 0.4 MG 24 hr capsule Take 1  "capsule by mouth daily.     traMADol (ULTRAM) 50 MG tablet Take 1 tablet (50 mg) by mouth every 6 hours as needed for moderate pain     No current facility-administered medications for this visit.        Allergies   Allergen Reactions     Penicillins      \"anaphylactic\"     Sulfa Drugs      \"itchy rash, swelling of face and hands\"     Ancef [Cefazolin] Rash       Social History     Social History     Marital status:      Spouse name: N/A     Number of children: N/A     Years of education: N/A     Occupational History      Self     Social History Main Topics     Smoking status: Former Smoker     Quit date: 3/17/1993     Smokeless tobacco: Never Used     Alcohol use Yes      Comment: Occ, Once a month     Drug use: No     Sexual activity: No     Other Topics Concern     Not on file     Social History Narrative          Information reviewed:  Medications, vital signs, orders, nursing notes, problem list, hospital information.     ROS: All 10 point review of system completed, those pertinent positive, please see H&P, the remaining ROS is negative.    /71  Pulse 90  Temp 97.5  F (36.4  C)  Resp 16  Ht 6' (1.829 m)  Wt 250 lb (113.4 kg)  SpO2 96%  BMI 33.91 kg/m2    PHYSICAL EXAMINATION:   GENERAL:  No acute distress. Laying in bed.  SKIN:  Dry and warm.  There is no rash, lesions, ulcers or juandice at area of skin examined.  HEENT:  Head without trauma.  Pupils round, reactive. Exam of conjunctiva and lids are normal. Sclera without icterus. There is no oral thrush.  NECK:  Supple.  There is no cervical adenopathy, no thyromegaly. No jugular venous distension.  CHEST: No reproducible chest tenderness.   LUNGS:  Normal respiratory effort. Lungs are Clear on ascultation.  HEART:  Regular rate and rhythm.  No murmur, gallops or rubs auscultated.  ABDOMEN:  Soft, bowel sounds positive.  There is no tenderness or guarding.   EXTREMITIES: No edema. Dressing to LLE which I did not uncover. + ttp on " knees.  NEUROLOGIC:  Alert and oriented x3.     Lab/Diagnostic data:  Reviewed    Lab Results   Component Value Date    WBC 14.5 10/22/2018     Lab Results   Component Value Date    RBC 3.92 10/22/2018     Lab Results   Component Value Date    HGB 11.1 10/22/2018     Lab Results   Component Value Date    HCT 36.0 10/22/2018     Lab Results   Component Value Date    MCV 92 10/22/2018     Lab Results   Component Value Date    MCH 28.3 10/22/2018     Lab Results   Component Value Date    MCHC 30.8 10/22/2018     Lab Results   Component Value Date    RDW 15.7 10/22/2018     Lab Results   Component Value Date     10/22/2018       Last Comprehensive Metabolic Panel:  Sodium   Date Value Ref Range Status   10/22/2018 133 133 - 144 mmol/L Final     Potassium   Date Value Ref Range Status   10/22/2018 4.9 3.4 - 5.3 mmol/L Final     Chloride   Date Value Ref Range Status   10/22/2018 99 94 - 109 mmol/L Final     Carbon Dioxide   Date Value Ref Range Status   10/22/2018 27 20 - 32 mmol/L Final     Anion Gap   Date Value Ref Range Status   10/22/2018 7 3 - 14 mmol/L Final     Glucose   Date Value Ref Range Status   10/22/2018 166 (H) 70 - 99 mg/dL Final     Urea Nitrogen   Date Value Ref Range Status   10/22/2018 30 7 - 30 mg/dL Final     Creatinine   Date Value Ref Range Status   10/22/2018 1.01 0.66 - 1.25 mg/dL Final     GFR Estimate   Date Value Ref Range Status   10/22/2018 72 >60 mL/min/1.7m2 Final     Comment:     Non  GFR Calc     Calcium   Date Value Ref Range Status   10/22/2018 9.4 8.5 - 10.1 mg/dL Final       ASSESSMENT / PLAN:     Diabetic foot infection (H)  - MRI showed OM of L 3rd toe.  - s/p amputation 10/15. Path confirms OM 3rd toe.  - Foot Cx showed MSSA.  - Rx with ancef, however, developed rash and switched to PO minocin.  - f/u with podiatry.    Type 2 diabetes mellitus with hyperosmolarity without coma, with long-term current use of insulin (H)  - ADA diet.  - On metformin and  aspart.  - Adjust insulin given prednisone use for preprandial 90-13 mg/dl.  - On gabapentin for neuropathy.    Essential hypertension  - On coreg and lisinopril.    Benign prostatic hyperplasia, unspecified whether lower urinary tract symptoms present  - On flomax and proscar.    History of TIA (transient ischemic attack)   - On ASA.    Acute gouty arthritis  - Arthrocentesis showed MSU crystals from R knee.  - On prednisone burst.  - consider colchicine and if no improvement intra-articular steroids.    Physical deconditioning  -Plan: PT/OT, fall precautions. Care conference with patient and family for the progress of rehab and disposition issues will be discussed as planned. Rehab evaluation and other evaluations including CPT are at rehab logs, to be reviewed separately.  Fall risk assessment as well as cognitive evaluation will be formed during rehab stay if indicated.      Other problems with same care. Primary care doctor and other specialists to address those chronic problems in next clinic appointment to be scheduled upon discharge from the TCU.    Total time spent with patient visit was 50 min including patient visit, review of past records, 1/2 time on patients counseling and coordinating care.

## 2018-10-24 NOTE — LETTER
10/24/2018        RE: Lance MORALES Patrice  [id number]   [place] [address]          PRIMARY CARE PROVIDER AND CLINIC RESPONSIBLE:  Anh Spivey, [address] [facility] / [place] [address]        ADMISSION HISTORY AND PHYSICAL EXAMINATION     Chief Complaint   Patient presents with     Hospital F/U         HISTORY OF PRESENT ILLNESS:  73 year old male, (1945), [facility] and [facility].    Pt admitted [facility] 10/14 to 10/22 for cellulitis and OM of L foot. S/p amputation of L 3rd toe 10/15. Hospital course complicated by drug reaction and gout both knees.    Pt states the knees are painful when bearing weight. Does have Hx of OA of knees and had intra-articular steroids. Pain is not as bad as in the hospital. Rash is receding. No fevers/chills/chest pain.    Please see Hilary Bonilla 's CNP admit noted dated 10/23 for details of admission, past medical history, family history, allergies, medication list, social history and other details pertinent with this admission. Hospital admission and dc summary reviewed.      Past Medical History:   Diagnosis Date     Chronic rhinitis      Diabetes mellitus (H)      HTN (hypertension)      Hypertrophy of prostate with urinary obstruction and other lower urinary tract symptoms (LUTS)      TIA (transient ischaemic attack) 1993     Unspecified transient cerebral ischemia 1993    No definite source found       Past Surgical History:   Procedure Laterality Date     AMPUTATE TOE(S) Left 10/15/2018    Procedure: AMPUTATE TOE(S);  Left third toe amputation;  Surgeon: Ayaka Azevedo DPM, Podiatry/Foot and Ankle Surgery;  Location: [facility] OR     C NONSPECIFIC PROCEDURE  1985    Diastasis recti repair     C NONSPECIFIC PROCEDURE  1987    Ventral hernia repair     C NONSPECIFIC PROCEDURE      ORIF of left shoulder     COLONOSCOPY  3/9/2013    Procedure: COLONOSCOPY;  COLONOSCOPY;  Surgeon: Chau Hogan MD;  Location:  GI      EYE SURGERY  03/13/2017    left eye     FOOT SURGERY  4/2013    cyst removal      HERNIA REPAIR  7/13/2004    ventral        Current Outpatient Prescriptions   Medication Sig     aspirin 325 MG tablet Take 325 mg by mouth daily      blood glucose monitoring (ACCU-CHEK SMARTVIEW) test strip USE TO TEST TWICE DAILY     carvedilol (COREG) 3.125 MG tablet Take 1 tablet (3.125 mg) by mouth 2 times daily (with meals)     diclofenac (VOLTAREN) 1 % GEL topical gel Place 2 g onto the skin 4 times daily Leg and knee     ferrous gluconate (FERGON) 324 (38 FE) MG tablet Take 1 tablet by mouth daily.     finasteride (PROSCAR) 5 MG tablet Take 5 mg by mouth daily.     fluticasone (FLONASE) 50 MCG/ACT spray Spray 1 spray into both nostrils 2 times daily     gabapentin (NEURONTIN) 100 MG capsule Take 1 capsule (100 mg) by mouth 2 times daily     gabapentin (NEURONTIN) 100 MG capsule Take 2 capsules (200 mg) by mouth At Bedtime     insulin aspart (NOVOLOG PEN) 100 UNIT/ML injection Inject 1-10 Units Subcutaneous 3 times daily (before meals)     insulin aspart (NOVOLOG PEN) 100 UNIT/ML injection Inject 1-7 Units Subcutaneous At Bedtime     lidocaine (LMX4) 4 % CREA cream Apply topically every hour as needed for pain (with VAD insertion or accessing implanted port.)     lisinopril (PRINIVIL/ZESTRIL) 10 MG tablet TAKE 2 TABLETS (20 MG) BY MOUTH DAILY     loratadine (CLARITIN) 10 MG tablet Take 10 mg by mouth At Bedtime     metFORMIN (GLUCOPHAGE) 1000 MG tablet TAKE 1 TABLET (1,000 MG) BY MOUTH 2 TIMES DAILY (WITH MEALS) **DUE FOR APRIL APPT/LABS. CALL MD**     minocycline (MINOCIN/DYNACIN) 100 MG capsule Take 1 capsule (100 mg) by mouth every 12 hours for 9 days     omeprazole (PRILOSEC) 20 MG CR capsule Take 1 capsule (20 mg) by mouth every morning (before breakfast)     oxyCODONE IR (ROXICODONE) 5 MG tablet Take 0.5 tablets (2.5 mg) by mouth every 4 hours as needed for severe pain     predniSONE (DELTASONE) 20 MG tablet Take 2  "tablets (40 mg) by mouth daily for 3 days     tamsulosin (FLOMAX) 0.4 MG 24 hr capsule Take 1 capsule by mouth daily.     traMADol (ULTRAM) 50 MG tablet Take 1 tablet (50 mg) by mouth every 6 hours as needed for moderate pain     No current facility-administered medications for this visit.        Allergies   Allergen Reactions     Penicillins      \"anaphylactic\"     Sulfa Drugs      \"itchy rash, swelling of face and hands\"     Ancef [Cefazolin] Rash       Social History     Social History     Marital status:      Spouse name: N/A     Number of children: N/A     Years of education: N/A     Occupational History      Self     Social History Main Topics     Smoking status: Former Smoker     Quit date: 3/17/1993     Smokeless tobacco: Never Used     Alcohol use Yes      Comment: Occ, Once a month     Drug use: No     Sexual activity: No     Other Topics Concern     Not on file     Social History Narrative          Information reviewed:  Medications, vital signs, orders, nursing notes, problem list, hospital information.     ROS: All 10 point review of system completed, those pertinent positive, please see H&P, the remaining ROS is negative.    /71  Pulse 90  Temp 97.5  F (36.4  C)  Resp 16  Ht 6' (1.829 m)  Wt 250 lb (113.4 kg)  SpO2 96%  BMI 33.91 kg/m2    PHYSICAL EXAMINATION:   GENERAL:  No acute distress. Laying in bed.  SKIN:  Dry and warm.  There is no rash, lesions, ulcers or juandice at area of skin examined.  HEENT:  Head without trauma.  Pupils round, reactive. Exam of conjunctiva and lids are normal. Sclera without icterus. There is no oral thrush.  NECK:  Supple.  There is no cervical adenopathy, no thyromegaly. No jugular venous distension.  CHEST: No reproducible chest tenderness.   LUNGS:  Normal respiratory effort. Lungs are Clear on ascultation.  HEART:  Regular rate and rhythm.  No murmur, gallops or rubs auscultated.  ABDOMEN:  Soft, bowel sounds positive.  There is no tenderness or " guarding.   EXTREMITIES: No edema. Dressing to LLE which I did not uncover. + ttp on knees.  NEUROLOGIC:  Alert and oriented x3.     Lab/Diagnostic data:  Reviewed    Lab Results   Component Value Date    WBC 14.5 10/22/2018     Lab Results   Component Value Date    RBC 3.92 10/22/2018     Lab Results   Component Value Date    HGB 11.1 10/22/2018     Lab Results   Component Value Date    HCT 36.0 10/22/2018     Lab Results   Component Value Date    MCV 92 10/22/2018     Lab Results   Component Value Date    MCH 28.3 10/22/2018     Lab Results   Component Value Date    MCHC 30.8 10/22/2018     Lab Results   Component Value Date    RDW 15.7 10/22/2018     Lab Results   Component Value Date     10/22/2018       Last Comprehensive Metabolic Panel:  Sodium   Date Value Ref Range Status   10/22/2018 133 133 - 144 mmol/L Final     Potassium   Date Value Ref Range Status   10/22/2018 4.9 3.4 - 5.3 mmol/L Final     Chloride   Date Value Ref Range Status   10/22/2018 99 94 - 109 mmol/L Final     Carbon Dioxide   Date Value Ref Range Status   10/22/2018 27 20 - 32 mmol/L Final     Anion Gap   Date Value Ref Range Status   10/22/2018 7 3 - 14 mmol/L Final     Glucose   Date Value Ref Range Status   10/22/2018 166 (H) 70 - 99 mg/dL Final     Urea Nitrogen   Date Value Ref Range Status   10/22/2018 30 7 - 30 mg/dL Final     Creatinine   Date Value Ref Range Status   10/22/2018 1.01 0.66 - 1.25 mg/dL Final     GFR Estimate   Date Value Ref Range Status   10/22/2018 72 >60 mL/min/1.7m2 Final     Comment:     Non  GFR Calc     Calcium   Date Value Ref Range Status   10/22/2018 9.4 8.5 - 10.1 mg/dL Final       ASSESSMENT / PLAN:     Diabetic foot infection (H)  - MRI showed OM of L 3rd toe.  - s/p amputation 10/15. Path confirms OM 3rd toe.  - Foot Cx showed MSSA.  - Rx with ancef, however, developed rash and switched to PO minocin.  - f/u with podiatry.    Type 2 diabetes mellitus with hyperosmolarity without  coma, with long-term current use of insulin (H)  - ADA diet.  - On metformin and aspart.  - Adjust insulin given prednisone use for preprandial 90-13 mg/dl.  - On gabapentin for neuropathy.    Essential hypertension  - On coreg and lisinopril.    Benign prostatic hyperplasia, unspecified whether lower urinary tract symptoms present  - On flomax and proscar.    History of TIA (transient ischemic attack)   - On ASA.    Acute gouty arthritis  - Arthrocentesis showed MSU crystals from R knee.  - On prednisone burst.  - consider colchicine and if no improvement intra-articular steroids.    Physical deconditioning  -Plan: PT/OT, fall precautions. Care conference with patient and family for the progress of rehab and disposition issues will be discussed as planned. Rehab evaluation and other evaluations including CPT are at rehab logs, to be reviewed separately.  Fall risk assessment as well as cognitive evaluation will be formed during rehab stay if indicated.      Other problems with same care. Primary care doctor and other specialists to address those chronic problems in next clinic appointment to be scheduled upon discharge from the TCU.    Total time spent with patient visit was 50 min including patient visit, review of past records, 1/2 time on patients counseling and coordinating care.            Sincerely,        Minerva La MD

## 2018-10-25 LAB
BACTERIA SPEC CULT: NO GROWTH
BACTERIA SPEC CULT: NO GROWTH
SPECIMEN SOURCE: NORMAL
SPECIMEN SOURCE: NORMAL

## 2018-10-27 ENCOUNTER — TRANSFERRED RECORDS (OUTPATIENT)
Dept: HEALTH INFORMATION MANAGEMENT | Facility: CLINIC | Age: 73
End: 2018-10-27

## 2018-10-27 ENCOUNTER — MEDICAL CORRESPONDENCE (OUTPATIENT)
Dept: HEALTH INFORMATION MANAGEMENT | Facility: CLINIC | Age: 73
End: 2018-10-27

## 2018-10-27 ENCOUNTER — HOSPITAL LABORATORY (OUTPATIENT)
Dept: OTHER | Facility: CLINIC | Age: 73
End: 2018-10-27

## 2018-10-27 LAB
APPEARANCE FLD: NORMAL
COLOR FLD: YELLOW
CRYSTALS SNV MICRO: NORMAL
LYMPHOCYTES NFR FLD MANUAL: 21 %
MONOS+MACROS NFR FLD MANUAL: 37 %
NEUTS BAND NFR FLD MANUAL: 42 %
SPECIMEN SOURCE FLD: NORMAL
WBC # FLD AUTO: 2283 /UL

## 2018-10-29 ENCOUNTER — RECORDS - HEALTHEAST (OUTPATIENT)
Dept: LAB | Facility: CLINIC | Age: 73
End: 2018-10-29

## 2018-10-30 ENCOUNTER — NURSING HOME VISIT (OUTPATIENT)
Dept: GERIATRICS | Facility: CLINIC | Age: 73
End: 2018-10-30
Payer: COMMERCIAL

## 2018-10-30 ENCOUNTER — TRANSFERRED RECORDS (OUTPATIENT)
Dept: HEALTH INFORMATION MANAGEMENT | Facility: CLINIC | Age: 73
End: 2018-10-30

## 2018-10-30 VITALS
HEIGHT: 72 IN | BODY MASS INDEX: 33.59 KG/M2 | OXYGEN SATURATION: 97 % | WEIGHT: 248 LBS | SYSTOLIC BLOOD PRESSURE: 158 MMHG | RESPIRATION RATE: 18 BRPM | TEMPERATURE: 97 F | HEART RATE: 81 BPM | DIASTOLIC BLOOD PRESSURE: 74 MMHG

## 2018-10-30 DIAGNOSIS — M86.9 OSTEOMYELITIS OF LEFT FOOT, UNSPECIFIED TYPE (H): ICD-10-CM

## 2018-10-30 DIAGNOSIS — L50.9 URTICARIA: ICD-10-CM

## 2018-10-30 DIAGNOSIS — A41.01 SEPSIS DUE TO METHICILLIN SUSCEPTIBLE STAPHYLOCOCCUS AUREUS (H): Primary | ICD-10-CM

## 2018-10-30 DIAGNOSIS — E11.59 TYPE 2 DIABETES MELLITUS WITH OTHER CIRCULATORY COMPLICATION, WITHOUT LONG-TERM CURRENT USE OF INSULIN (H): ICD-10-CM

## 2018-10-30 DIAGNOSIS — M17.0 OSTEOARTHRITIS OF BOTH KNEES, UNSPECIFIED OSTEOARTHRITIS TYPE: ICD-10-CM

## 2018-10-30 DIAGNOSIS — D64.9 ANEMIA, UNSPECIFIED TYPE: ICD-10-CM

## 2018-10-30 DIAGNOSIS — E87.5 HYPERKALEMIA: ICD-10-CM

## 2018-10-30 DIAGNOSIS — I10 ESSENTIAL HYPERTENSION: ICD-10-CM

## 2018-10-30 DIAGNOSIS — Z89.422 STATUS POST AMPUTATION OF TOE OF LEFT FOOT (H): ICD-10-CM

## 2018-10-30 DIAGNOSIS — R53.81 PHYSICAL DECONDITIONING: ICD-10-CM

## 2018-10-30 DIAGNOSIS — M10.9 GOUT OF KNEE, UNSPECIFIED CAUSE, UNSPECIFIED CHRONICITY, UNSPECIFIED LATERALITY: ICD-10-CM

## 2018-10-30 LAB
ANION GAP SERPL CALCULATED.3IONS-SCNC: 13 MMOL/L (ref 5–18)
ANION GAP SERPL CALCULATED.3IONS-SCNC: 13 MMOL/L (ref 5–18)
BUN SERPL-MCNC: 40 MG/DL (ref 8–28)
BUN SERPL-MCNC: 40 MG/DL (ref 8–28)
CALCIUM SERPL-MCNC: 9.7 MG/DL (ref 8.5–10.5)
CALCIUM SERPL-MCNC: 9.7 MG/DL (ref 8.5–10.5)
CHLORIDE BLD-SCNC: 105 MMOL/L (ref 98–107)
CHLORIDE SERPLBLD-SCNC: 105 MMOL/L (ref 98–107)
CO2 SERPL-SCNC: 19 MMOL/L (ref 22–31)
CO2 SERPL-SCNC: 19 MMOL/L (ref 22–31)
CREAT SERPL-MCNC: 1.08 MG/DL (ref 0.7–1.3)
CREAT SERPL-MCNC: 1.08 MG/DL (ref 0.7–1.3)
ERYTHROCYTE [DISTWIDTH] IN BLOOD BY AUTOMATED COUNT: 16.5 % (ref 11–14.5)
ERYTHROCYTE [DISTWIDTH] IN BLOOD BY AUTOMATED COUNT: 16.5 % (ref 11–14.5)
GFR SERPL CREATININE-BSD FRML MDRD: >60 ML/MIN/1.73M2
GFR SERPL CREATININE-BSD FRML MDRD: >60 ML/MIN/1.73M2
GLUCOSE BLD-MCNC: 91 MG/DL (ref 70–125)
GLUCOSE SERPL-MCNC: 91 MG/DL (ref 70–125)
HCT VFR BLD AUTO: 36.6 % (ref 40–54)
HCT VFR BLD AUTO: 36.6 % (ref 40–54)
HEMOGLOBIN: 11 G/DL (ref 14–18)
HGB BLD-MCNC: 11 G/DL (ref 14–18)
MCH RBC QN AUTO: 28.4 PG (ref 27–34)
MCH RBC QN AUTO: 28.4 PG (ref 27–34)
MCHC RBC AUTO-ENTMCNC: 30.1 G/DL (ref 32–36)
MCHC RBC AUTO-ENTMCNC: 30.1 G/DL (ref 32–36)
MCV RBC AUTO: 95 FL (ref 80–100)
MCV RBC AUTO: 95 FL (ref 80–100)
PLATELET # BLD AUTO: 535 THOU/UL (ref 140–440)
PLATELET # BLD AUTO: 535 THOU/UL (ref 140–440)
PMV BLD AUTO: 10.8 FL (ref 8.5–12.5)
POTASSIUM BLD-SCNC: 5.2 MMOL/L (ref 3.5–5)
POTASSIUM SERPL-SCNC: 5.2 MMOL/L (ref 3.5–5)
RBC # BLD AUTO: 3.87 MILL/UL (ref 4.4–6.2)
RBC # BLD AUTO: 3.87 MILL/UL (ref 4.4–6.2)
SODIUM SERPL-SCNC: 137 MMOL/L (ref 136–145)
SODIUM SERPL-SCNC: 137 MMOL/L (ref 136–145)
WBC # BLD AUTO: 10.2 THOU/UL (ref 4–11)
WBC: 10.2 THOU/UL (ref 4–11)

## 2018-10-30 PROCEDURE — 99310 SBSQ NF CARE HIGH MDM 45: CPT | Performed by: NURSE PRACTITIONER

## 2018-10-30 NOTE — LETTER
10/30/2018        RE: Lance Ibrahim  [address]   [place] [address]        [facility]    Chief Complaint   Patient presents with     MARK ANTHONYECK       [facility] Medical Record Number:  [id number]  Place of Service where encounter took place:  [facility] ([facility]) [[id number]]    HPI:    Lance Ibrahim is a 73 year old  (1945), who is being seen today for an episodic care visit.  HPI information obtained from: facility chart records, facility staff, patient report and [facility] chart review.  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 However at time of presentation with worsening cellulitis and redness and signs of sepsis. Imaging showed osteomyelitis. S/p amputation of third toe on 10/15.    Cultures came back positive for MSSA. Was followed by ID during hospitalization. Developed drug reaction to ancef. Now on minocycline. Completes 10 day course on 10/31. Dressing to stay in place until follow up with podiatry later this week. To wear boot when up. Follow up with podiatry on 11/2. Continues to remain afebrile since admission.      Gout of knee, unspecified cause, unspecified chronicity, unspecified laterality  Osteoarthritis of both knees, unspecified osteoarthritis type  Patient seen by orthopedics during hospitalization. His knees were tapped and crystals were found. Completed oral prednisone 10/25. Also with history of OA. Patient tells me he developed significant pain of both knees on Friday evening, so he saw [facility] on Saturday. They administered one cortisone injection in his left knee and obtained fluid sample of left knee. At this time the preliminary results show no growth. They did not administer an injection in the right knee, but the  patient does not know why.     He has been taking tramadol scheduled during the day and oxycodone scheduled before bed, as well as scheduled tylenol, scheduled colchicine, and scheduled diclofenac gel. He continues to report substantial pain, especially after working with therapy. He would like to change his pain regimen today and discontinue tramadol, change tylenol to PRN, and start oxycodone PRN. Also spoke with MOUNIKA MARINO to discuss case. He may be able to inject right knee on Monday 11/5 as long as all culture results remain negative and patient remains stable.    Hyperkalemia  Potassium elevated today to 5.2.     Essential hypertension  Continues on PTA carvedilol, lisinopril.     BP: 125-158/60-85 mmHg  P: 81-97 bpm     Type 2 diabetes mellitus with other circulatory complication, without long-term current use of insulin (H)  PTA metformin BID. Now on sliding scale insulin with meals and at bedtime since on prednisone. On consistent carb diet.  Recent Blood Sugars:  Date: 10/22/2018 - 10/29/2018   Time  Mon  22 Tue  23 Wed  24 Thu  25 Fri  26 Sat  27 Sun  28 Mon  29   07:30  Blood Sugar X 184 mg/dL 123 mg/dL 110 mg/dL 100 mg/dL 111 mg/dL* 128 mg/dL 114 mg/dL   12:00  Blood Sugar X 175 mg/dL 178 mg/dL 166 mg/dL 113 mg/dL 99 mg/dL 99 mg/dL 106 mg/dL   17:00  Blood Sugar X 227 mg/dL 179 mg/dL 149 mg/dL 115 mg/dL 209 mg/dL 184 mg/dL 95 mg/dL   20:00  Blood Sugar 356 mg/dL 151 mg/dL 172 mg/dL 105 mg/dL 133 mg/dL 148 mg/dL 102 mg/dL 119 mg/dL       Lab Results   Component Value Date    A1C 6.4 10/17/2018    A1C 6.7 05/01/2018    A1C 6.3 10/07/2017    A1C 6.3 05/15/2017    A1C 5.6 10/12/2016     Anemia, unspecified type  Baseline hgb 11-12. Is on iron supplement. Hgb 11.0 today.  Hemoglobin   Date Value Ref Range Status   10/22/2018 11.1 (L) 13.3 - 17.7 g/dL Final   10/21/2018 10.5 (L) 13.3 - 17.7 g/dL Final       Physical deconditioning  Here for therapies. Lives with his wife in a home. Prior to  hospitalization he tells me he was not using an assistive device.  Per therapy he is ubd min assist, lbd max assist, transfers mod-max assist, AMB 10 steps in parallel bars.    Urticaria secondary to drug reaction  Developed urticaria and drug reaction to ancef while in the hospital. Completed prednisone for reaction on 10/25. He reports that the rash is improved. Is now on minocycline, which will be finished tomorrow.    ALLERGIES: Penicillins; Sulfa drugs; and Ancef [cefazolin]  Past Medical, Surgical, Family and Social History reviewed and updated in [facility].    Current Outpatient Prescriptions   Medication Sig Dispense Refill     Acetaminophen (TYLENOL PO) Take 650 mg by mouth every 6 hours as needed for mild pain or fever       aspirin 325 MG tablet Take 325 mg by mouth daily        blood glucose monitoring (ACCU-CHEK SMARTVIEW) test strip USE TO TEST TWICE DAILY 200 strip 1     carvedilol (COREG) 3.125 MG tablet Take 1 tablet (3.125 mg) by mouth 2 times daily (with meals) 60 tablet 0     COLCHICINE PO Take 0.6 mg by mouth every other day        diclofenac (VOLTAREN) 1 % GEL topical gel Place 2 g onto the skin 4 times daily Leg and knee (Patient taking differently: Place 4 g onto the skin 4 times daily Leg and knee) 1 Tube 0     ferrous gluconate (FERGON) 324 (38 FE) MG tablet Take 1 tablet by mouth daily.       finasteride (PROSCAR) 5 MG tablet Take 5 mg by mouth daily.       fluticasone (FLONASE) 50 MCG/ACT spray Spray 1 spray into both nostrils 2 times daily       gabapentin (NEURONTIN) 100 MG capsule Take 1 capsule (100 mg) by mouth 2 times daily 60 capsule 0     gabapentin (NEURONTIN) 100 MG capsule Take 2 capsules (200 mg) by mouth At Bedtime 60 capsule 0     loratadine (CLARITIN) 10 MG tablet Take 10 mg by mouth At Bedtime       metFORMIN (GLUCOPHAGE) 1000 MG tablet TAKE 1 TABLET (1,000 MG) BY MOUTH 2 TIMES DAILY (WITH MEALS) **DUE FOR APRIL APPT/LABS. CALL MD** 180 tablet 1     minocycline  (MINOCIN/DYNACIN) 100 MG capsule Take 1 capsule (100 mg) by mouth every 12 hours for 9 days 18 capsule 0     omeprazole (PRILOSEC) 20 MG CR capsule Take 1 capsule (20 mg) by mouth every morning (before breakfast) 30 capsule 0     OXYCODONE HCL PO Take 5 mg by mouth every 6 hours as needed        tamsulosin (FLOMAX) 0.4 MG 24 hr capsule Take 1 capsule by mouth daily.       Medications reviewed:  Medications reconciled to facility chart and changes were made to reflect current medications as identified as above med list.     REVIEW OF SYSTEMS:  8 point ROS of systems including Constitutional, Respiratory, Cardiovascular, Gastroenterology, Genitourinary, Integumentary, Musculoskeletal, Neurological were all negative except for pertinent positives noted in my HPI.    Physical Exam:  /74  Pulse 81  Temp 97  F (36.1  C)  Resp 18  Ht 6' (1.829 m)  Wt 248 lb (112.5 kg)  SpO2 97%  BMI 33.63 kg/m2  GENERAL APPEARANCE:  Alert, pleasant and cooperative, sitting on edge of bed on exam today  EYES:  EOM, lids, pupils and irises normal, sclera clear and conjunctiva normal, no discharge or mattering on lids or lashes noted  ENT:  Mouth normal, moist mucous membranes, nose without drainage or crusting, external ears without lesions  RESP:  respiratory effort normal, no respiratory distress, Lung sounds clear, patient is on RA  CV: auscultation of heart done, rate and rhythm regular. generalized edema  to BLE  ABDOMEN:  normal bowel sounds, soft, nontender, no grimacing or guarding with palpation.  M/S:  with tenderness to bilateral knees; able to move all extremities but limited movement to knees due to pain.   SKIN:  Inspection and palpation of skin and subcutaneous tissue: skin warm, dry without rashes, left third toe bandaged- no drainage noted through dressing, urticarial rash to bilateral arms and back healing and erythema fading  NEURO: cranial nerves 2-12 grossly intact, no facial asymmetry, no speech deficits  and able to follow directions, moves all extremities symmetrically  PSYCH:  insight and judgement intact, memory intact, affect and mood normal    Recent Labs:     CBC RESULTS:   Recent Labs   Lab Test  10/22/18   0628  10/21/18   0619   WBC  14.5*  13.2*   RBC  3.92*  3.70*   HGB  11.1*  10.5*   HCT  36.0*  33.7*   MCV  92  91   MCH  28.3  28.4   MCHC  30.8*  31.2*   RDW  15.7*  15.8*   PLT  488*  418       Last Basic Metabolic Panel:  Recent Labs   Lab Test  10/22/18   0628  10/21/18   0619   NA  133  134   POTASSIUM  4.9  4.7   CHLORIDE  99  97   SHANNAN  9.4  8.9   CO2  27  29   BUN  30  22   CR  1.01  1.11   GLC  166*  154*       Lab Results   Component Value Date    A1C 6.4 10/17/2018    A1C 6.7 05/01/2018       Assessment/Plan:  (A41.01) Sepsis due to methicillin susceptible Staphylococcus aureus (H)  (primary encounter diagnosis)  (M86.9) Osteomyelitis of left foot, unspecified type (H)  (Z89.422) 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 Keep dressing in place until follow up with podiatry 11/2. Continue to wear boot when up. Incentive spirometer q2h when awake.      (M10.9) Gout of knee, unspecified cause, unspecified chronicity, unspecified laterality  (M17.0) Osteoarthritis of both knees, unspecified osteoarthritis type  Comment: Acute, continues to have significant pain; left knee fluid sample remains negative preliminarily  Plan: Adjust pain regimen per patient request to: Tylenol 650 mg PRN, Discontinue tramadol. Continue scheduled colchicine. Increase oxycodone to 5 mg q6h PRN. Continue voltaren gel but increase dose to 4 grams QID. MOUNIKA Arora with [facility] will see patient on Monday 11/5 for knee injection of right knee as long as he remains without infection and all cultures remain negative.      (E87.5) Hyperkalemia  Comment: Acute   Plan: Spoke with geriatric pharmacist to  discuss medications. Will change colchicine to every other day and hold lisinopril. Recheck BMP 11/1. Monitor for s/sx of elevated potassium.    (I10) Essential hypertension  Comment: Chronic controlled since admission  Plan: Continue medications as above. BMP pending today. VS per TCU policy      (E11.59) Type 2 diabetes mellitus with other circulatory complication, without long-term current use of insulin (H)  Comment: Chronic, a1c with good control. BS within goal after completing prednisone course  Plan: Continue metformin. Discontinue QID BS. Discontinue novolog sliding scale. Check BS TID (fasting AM, before lunch and before bedtime) on M, Th.    (D64.9) Anemia, unspecified type  Comment: Hgb stable today  Plan: Continue iron supplement. CBC PRN. Monitor for s/sx of bleeding.      (R53.81) Physical deconditioning  Comment: Acute  Plan: Continue to encourage participation in physical therapy/occupational therapy for strengthening and deconditioning. Discharge planning per their recommendation. Social work to assist with d/c planning.    (L50.9) Urticaria secondary to drug reaction  Comment: Acute, improving  Plan: Continue to monitor for resolution    Total time spent with patient visit at the [facility] nursing facility was 42 minutes including patient visit, review of past records and phone call with orthopedics NED Read and geriatric pharmacist. Greater than 50% of total time spent with counseling and coordinating care due to sepsis, osteomyelitis, s/p amputation of toe and other problems as above.    Electronically signed by  RANJAN Butt CNP                  Sincerely,        RANJAN Butt CNP

## 2018-10-30 NOTE — PROGRESS NOTES
[facility]    Chief Complaint   Patient presents with     CHARLEE       [facility] Record Number:  [id number]  Place of Service where encounter took place:  [facility] ([facility] [[id number]]    HPI:    Lance Ibrahim is a 73 year old  (1945), who is being seen today for an episodic care visit.  HPI information obtained from: facility chart records, facility staff, patient report and [facility] chart review.  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 However at time of presentation with worsening cellulitis and redness and signs of sepsis. Imaging showed osteomyelitis. S/p amputation of third toe on 10/15.    Cultures came back positive for MSSA. Was followed by ID during hospitalization. Developed drug reaction to ancef. Now on minocycline. Completes 10 day course on 10/31. Dressing to stay in place until follow up with podiatry later this week. To wear boot when up. Follow up with podiatry on 11/2. Continues to remain afebrile since admission.      Gout of knee, unspecified cause, unspecified chronicity, unspecified laterality  Osteoarthritis of both knees, unspecified osteoarthritis type  Patient seen by orthopedics during hospitalization. His knees were tapped and crystals were found. Completed oral prednisone 10/25. Also with history of OA. Patient tells me he developed significant pain of both knees on Friday evening, so he saw [facility] on Saturday. They administered one cortisone injection in his left knee and obtained fluid sample of left knee. At this time the preliminary results show no growth. They did not administer an injection in the right knee, but the patient does not know why.     He has been taking tramadol scheduled during the day and  oxycodone scheduled before bed, as well as scheduled tylenol, scheduled colchicine, and scheduled diclofenac gel. He continues to report substantial pain, especially after working with therapy. He would like to change his pain regimen today and discontinue tramadol, change tylenol to PRN, and start oxycodone PRN. Also spoke with MOUNIKA MARINO to discuss case. He may be able to inject right knee on Monday 11/5 as long as all culture results remain negative and patient remains stable.    Hyperkalemia  Potassium elevated today to 5.2.     Essential hypertension  Continues on PTA carvedilol, lisinopril.     BP: 125-158/60-85 mmHg  P: 81-97 bpm     Type 2 diabetes mellitus with other circulatory complication, without long-term current use of insulin (H)  PTA metformin BID. Now on sliding scale insulin with meals and at bedtime since on prednisone. On consistent carb diet.  Recent Blood Sugars:  Date: 10/22/2018 - 10/29/2018   Time  Mon  22 Tue  23 Wed  24 Thu  25 Fri  26 Sat  27 Sun  28 Mon  29   07:30  Blood Sugar X 184 mg/dL 123 mg/dL 110 mg/dL 100 mg/dL 111 mg/dL* 128 mg/dL 114 mg/dL   12:00  Blood Sugar X 175 mg/dL 178 mg/dL 166 mg/dL 113 mg/dL 99 mg/dL 99 mg/dL 106 mg/dL   17:00  Blood Sugar X 227 mg/dL 179 mg/dL 149 mg/dL 115 mg/dL 209 mg/dL 184 mg/dL 95 mg/dL   20:00  Blood Sugar 356 mg/dL 151 mg/dL 172 mg/dL 105 mg/dL 133 mg/dL 148 mg/dL 102 mg/dL 119 mg/dL       Lab Results   Component Value Date    A1C 6.4 10/17/2018    A1C 6.7 05/01/2018    A1C 6.3 10/07/2017    A1C 6.3 05/15/2017    A1C 5.6 10/12/2016     Anemia, unspecified type  Baseline hgb 11-12. Is on iron supplement. Hgb 11.0 today.  Hemoglobin   Date Value Ref Range Status   10/22/2018 11.1 (L) 13.3 - 17.7 g/dL Final   10/21/2018 10.5 (L) 13.3 - 17.7 g/dL Final       Physical deconditioning  Here for therapies. Lives with his wife in a home. Prior to hospitalization he tells me he was not using an assistive device.  Per therapy he is ubd min assist,  lbd max assist, transfers mod-max assist, AMB 10 steps in parallel bars.    Urticaria secondary to drug reaction  Developed urticaria and drug reaction to ancef while in the hospital. Completed prednisone for reaction on 10/25. He reports that the rash is improved. Is now on minocycline, which will be finished tomorrow.    ALLERGIES: Penicillins; Sulfa drugs; and Ancef [cefazolin]  Past Medical, Surgical, Family and Social History reviewed and updated in [facility].    Current Outpatient Prescriptions   Medication Sig Dispense Refill     Acetaminophen (TYLENOL PO) Take 650 mg by mouth every 6 hours as needed for mild pain or fever       aspirin 325 MG tablet Take 325 mg by mouth daily        blood glucose monitoring (ACCU-CHEK SMARTVIEW) test strip USE TO TEST TWICE DAILY 200 strip 1     carvedilol (COREG) 3.125 MG tablet Take 1 tablet (3.125 mg) by mouth 2 times daily (with meals) 60 tablet 0     COLCHICINE PO Take 0.6 mg by mouth every other day        diclofenac (VOLTAREN) 1 % GEL topical gel Place 2 g onto the skin 4 times daily Leg and knee (Patient taking differently: Place 4 g onto the skin 4 times daily Leg and knee) 1 Tube 0     ferrous gluconate (FERGON) 324 (38 FE) MG tablet Take 1 tablet by mouth daily.       finasteride (PROSCAR) 5 MG tablet Take 5 mg by mouth daily.       fluticasone (FLONASE) 50 MCG/ACT spray Spray 1 spray into both nostrils 2 times daily       gabapentin (NEURONTIN) 100 MG capsule Take 1 capsule (100 mg) by mouth 2 times daily 60 capsule 0     gabapentin (NEURONTIN) 100 MG capsule Take 2 capsules (200 mg) by mouth At Bedtime 60 capsule 0     loratadine (CLARITIN) 10 MG tablet Take 10 mg by mouth At Bedtime       metFORMIN (GLUCOPHAGE) 1000 MG tablet TAKE 1 TABLET (1,000 MG) BY MOUTH 2 TIMES DAILY (WITH MEALS) **DUE FOR APRIL APPT/LABS. CALL MD** 180 tablet 1     minocycline (MINOCIN/DYNACIN) 100 MG capsule Take 1 capsule (100 mg) by mouth every 12 hours for 9 days 18 capsule 0      omeprazole (PRILOSEC) 20 MG CR capsule Take 1 capsule (20 mg) by mouth every morning (before breakfast) 30 capsule 0     OXYCODONE HCL PO Take 5 mg by mouth every 6 hours as needed        tamsulosin (FLOMAX) 0.4 MG 24 hr capsule Take 1 capsule by mouth daily.       Medications reviewed:  Medications reconciled to facility chart and changes were made to reflect current medications as identified as above med list.     REVIEW OF SYSTEMS:  8 point ROS of systems including Constitutional, Respiratory, Cardiovascular, Gastroenterology, Genitourinary, Integumentary, Musculoskeletal, Neurological were all negative except for pertinent positives noted in my HPI.    Physical Exam:  /74  Pulse 81  Temp 97  F (36.1  C)  Resp 18  Ht 6' (1.829 m)  Wt 248 lb (112.5 kg)  SpO2 97%  BMI 33.63 kg/m2  GENERAL APPEARANCE:  Alert, pleasant and cooperative, sitting on edge of bed on exam today  EYES:  EOM, lids, pupils and irises normal, sclera clear and conjunctiva normal, no discharge or mattering on lids or lashes noted  ENT:  Mouth normal, moist mucous membranes, nose without drainage or crusting, external ears without lesions  RESP:  respiratory effort normal, no respiratory distress, Lung sounds clear, patient is on RA  CV: auscultation of heart done, rate and rhythm regular. generalized edema to BLE  ABDOMEN:  normal bowel sounds, soft, nontender, no grimacing or guarding with palpation.  M/S:  with tenderness to bilateral knees; able to move all extremities but limited movement to knees due to pain.   SKIN:  Inspection and palpation of skin and subcutaneous tissue: skin warm, dry without rashes, left third toe bandaged- no drainage noted through dressing, urticarial rash to bilateral arms and back healing and erythema fading  NEURO: cranial nerves 2-12 grossly intact, no facial asymmetry, no speech deficits and able to follow directions, moves all extremities symmetrically  PSYCH:  insight and judgement intact, memory  intact, affect and mood normal    Recent Labs:     CBC RESULTS:   Recent Labs   Lab Test  10/22/18   0628  10/21/18   0619   WBC  14.5*  13.2*   RBC  3.92*  3.70*   HGB  11.1*  10.5*   HCT  36.0*  33.7*   MCV  92  91   MCH  28.3  28.4   MCHC  30.8*  31.2*   RDW  15.7*  15.8*   PLT  488*  418       Last Basic Metabolic Panel:  Recent Labs   Lab Test  10/22/18   0628  10/21/18   0619   NA  133  134   POTASSIUM  4.9  4.7   CHLORIDE  99  97   SHANNAN  9.4  8.9   CO2  27  29   BUN  30  22   CR  1.01  1.11   GLC  166*  154*       Lab Results   Component Value Date    A1C 6.4 10/17/2018    A1C 6.7 05/01/2018       Assessment/Plan:  (A41.01) Sepsis due to methicillin susceptible Staphylococcus aureus (H)  (primary encounter diagnosis)  (M86.9) Osteomyelitis of left foot, unspecified type (H)  (Z89.422) 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 Keep dressing in place until follow up with podiatry 11/2. Continue to wear boot when up. Incentive spirometer q2h when awake.      (M10.9) Gout of knee, unspecified cause, unspecified chronicity, unspecified laterality  (M17.0) Osteoarthritis of both knees, unspecified osteoarthritis type  Comment: Acute, continues to have significant pain; left knee fluid sample remains negative preliminarily  Plan: Adjust pain regimen per patient request to: Tylenol 650 mg PRN, Discontinue tramadol. Continue scheduled colchicine. Increase oxycodone to 5 mg q6h PRN. Continue voltaren gel but increase dose to 4 grams QID. MOUNIKA Arora with [facility] will see patient on Monday 11/5 for knee injection of right knee as long as he remains without infection and all cultures remain negative.      (E87.5) Hyperkalemia  Comment: Acute   Plan: Spoke with geriatric pharmacist to discuss medications. Will change colchicine to every other day and hold lisinopril. Recheck BMP 11/1. Monitor for s/sx  of elevated potassium.    (I10) Essential hypertension  Comment: Chronic controlled since admission  Plan: Continue medications as above. BMP pending today. VS per TCU policy      (E11.59) Type 2 diabetes mellitus with other circulatory complication, without long-term current use of insulin (H)  Comment: Chronic, a1c with good control. BS within goal after completing prednisone course  Plan: Continue metformin. Discontinue QID BS. Discontinue novolog sliding scale. Check BS TID (fasting AM, before lunch and before bedtime) on M, Th.    (D64.9) Anemia, unspecified type  Comment: Hgb stable today  Plan: Continue iron supplement. CBC PRN. Monitor for s/sx of bleeding.      (R53.81) Physical deconditioning  Comment: Acute  Plan: Continue to encourage participation in physical therapy/occupational therapy for strengthening and deconditioning. Discharge planning per their recommendation. Social work to assist with d/c planning.    (L50.9) Urticaria secondary to drug reaction  Comment: Acute, improving  Plan: Continue to monitor for resolution    Total time spent with patient visit at the skilled nursing facility was 42 minutes including patient visit, review of past records and phone call with orthopedics NED Read and geriatric pharmacist. Greater than 50% of total time spent with counseling and coordinating care due to sepsis, osteomyelitis, s/p amputation of toe and other problems as above.    Electronically signed by  RANJAN Butt CNP

## 2018-10-31 ENCOUNTER — RECORDS - HEALTHEAST (OUTPATIENT)
Dept: LAB | Facility: CLINIC | Age: 73
End: 2018-10-31

## 2018-11-01 LAB
ANION GAP SERPL CALCULATED.3IONS-SCNC: 13 MMOL/L (ref 5–18)
BACTERIA SPEC CULT: NO GROWTH
BUN SERPL-MCNC: 40 MG/DL (ref 8–28)
CALCIUM SERPL-MCNC: 9.8 MG/DL (ref 8.5–10.5)
CHLORIDE BLD-SCNC: 108 MMOL/L (ref 98–107)
CO2 SERPL-SCNC: 18 MMOL/L (ref 22–31)
CREAT SERPL-MCNC: 1.06 MG/DL (ref 0.7–1.3)
GFR SERPL CREATININE-BSD FRML MDRD: >60 ML/MIN/1.73M2
GLUCOSE BLD-MCNC: 111 MG/DL (ref 70–125)
POTASSIUM BLD-SCNC: 5.2 MMOL/L (ref 3.5–5)
SODIUM SERPL-SCNC: 139 MMOL/L (ref 136–145)
SPECIMEN SOURCE: NORMAL

## 2018-11-02 ENCOUNTER — DISCHARGE SUMMARY NURSING HOME (OUTPATIENT)
Dept: GERIATRICS | Facility: CLINIC | Age: 73
End: 2018-11-02
Payer: COMMERCIAL

## 2018-11-02 VITALS
HEIGHT: 72 IN | OXYGEN SATURATION: 98 % | BODY MASS INDEX: 32.59 KG/M2 | HEART RATE: 89 BPM | TEMPERATURE: 97.5 F | RESPIRATION RATE: 16 BRPM | SYSTOLIC BLOOD PRESSURE: 145 MMHG | WEIGHT: 240.6 LBS | DIASTOLIC BLOOD PRESSURE: 83 MMHG

## 2018-11-02 DIAGNOSIS — E11.59 TYPE 2 DIABETES MELLITUS WITH OTHER CIRCULATORY COMPLICATION, WITHOUT LONG-TERM CURRENT USE OF INSULIN (H): ICD-10-CM

## 2018-11-02 DIAGNOSIS — Z89.422 STATUS POST AMPUTATION OF TOE OF LEFT FOOT (H): ICD-10-CM

## 2018-11-02 DIAGNOSIS — E87.5 HYPERKALEMIA: ICD-10-CM

## 2018-11-02 DIAGNOSIS — D64.9 ANEMIA, UNSPECIFIED TYPE: ICD-10-CM

## 2018-11-02 DIAGNOSIS — M10.9 GOUT OF KNEE, UNSPECIFIED CAUSE, UNSPECIFIED CHRONICITY, UNSPECIFIED LATERALITY: ICD-10-CM

## 2018-11-02 DIAGNOSIS — L50.9 URTICARIA: ICD-10-CM

## 2018-11-02 DIAGNOSIS — R53.81 PHYSICAL DECONDITIONING: ICD-10-CM

## 2018-11-02 DIAGNOSIS — M86.9 OSTEOMYELITIS OF LEFT FOOT, UNSPECIFIED TYPE (H): ICD-10-CM

## 2018-11-02 DIAGNOSIS — M17.0 OSTEOARTHRITIS OF BOTH KNEES, UNSPECIFIED OSTEOARTHRITIS TYPE: ICD-10-CM

## 2018-11-02 DIAGNOSIS — A41.01 SEPSIS DUE TO METHICILLIN SUSCEPTIBLE STAPHYLOCOCCUS AUREUS (H): Primary | ICD-10-CM

## 2018-11-02 DIAGNOSIS — I10 ESSENTIAL HYPERTENSION: ICD-10-CM

## 2018-11-02 PROCEDURE — 99316 NF DSCHRG MGMT 30 MIN+: CPT | Performed by: NURSE PRACTITIONER

## 2018-11-02 NOTE — PATIENT INSTRUCTIONS
[facility] Discharge Orders    Name: Lance Ibrahim  : 1945  Planned Discharge Date: 11/3/18  Discharged to: previous independent home    MEDICAL FOLLOW UP  Follow up with PCP in less than 1 week  Follow up with Specialists: podiatry, orthopedics per their recommendations    FUTURE LABS: BMP to be obtained by home care services on . PCP will write additional orders as needed.    ORDER CHANGES:  Lisinopril on hold. PCP will determine when to restart.    DISCHARGE MEDICATIONS:  The patient s pharmacy is authorized to dispense a 30-day supply of medications. Refill requests should be directed to the primary provider, Anh Spivey.   Additional hard copy prescription for oxycodone medication left in facility chart for patient/family to fill at pharmacy of choice after discharge from facility.  Current Outpatient Prescriptions   Medication Sig Dispense Refill     Acetaminophen (TYLENOL PO) Take 650 mg by mouth every 6 hours as needed for mild pain or fever       aspirin 325 MG tablet Take 325 mg by mouth daily        blood glucose monitoring (ACCU-CHEK SMARTVIEW) test strip USE TO TEST TWICE DAILY 200 strip 1     carvedilol (COREG) 3.125 MG tablet Take 1 tablet (3.125 mg) by mouth 2 times daily (with meals) 60 tablet 0     diclofenac (VOLTAREN) 1 % GEL topical gel Place 2 g onto the skin 4 times daily Leg and knee (Patient taking differently: Place 4 g onto the skin 4 times daily Leg and knee) 1 Tube 0     ferrous gluconate (FERGON) 324 (38 FE) MG tablet Take 1 tablet by mouth daily.       finasteride (PROSCAR) 5 MG tablet Take 5 mg by mouth daily.       fluticasone (FLONASE) 50 MCG/ACT spray Spray 1 spray into both nostrils 2 times daily       gabapentin (NEURONTIN) 100 MG capsule Take 1 capsule (100 mg) by mouth 2 times daily 60 capsule 0     gabapentin (NEURONTIN) 100 MG capsule Take 2 capsules (200 mg) by mouth At Bedtime 60 capsule 0     loratadine (CLARITIN) 10 MG tablet Take 10  mg by mouth At Bedtime       metFORMIN (GLUCOPHAGE) 1000 MG tablet TAKE 1 TABLET (1,000 MG) BY MOUTH 2 TIMES DAILY (WITH MEALS) **DUE FOR APRIL APPT/LABS. CALL MD** 180 tablet 1     omeprazole (PRILOSEC) 20 MG CR capsule Take 1 capsule (20 mg) by mouth every morning (before breakfast) 30 capsule 0     OXYCODONE HCL PO Take 5 mg by mouth every 6 hours as needed        tamsulosin (FLOMAX) 0.4 MG 24 hr capsule Take 1 capsule by mouth daily.         SERVICES:  Home Care:  Occupational Therapy, Physical Therapy, Registered Nurse, Home Health Aide, Social work and From:  [facility]    ADDITIONAL INSTRUCTIONS:  Weight bearing restrictions: minimal heel weight bearing in left foot. To wear boot as directed by surgeon.  Monitor blood glucose monitoring as per previous recommendations from PCP. Keep a record and bring it to your next primary provider visit.  Continue to follow your diet:  Low potassium diet and carb consistent diet.  Wound care per recommendations from surgeon 11/6- dressing to stay intact until follow up. Notify your surgeon if you have increased redness, swelling, tenderness, or drainage at your incision site.  Notify PCP if you have a fever greater than 100.5 degrees.  DO NOT DRIVE while taking narcotic pain medications.     RANJAN Butt CNP  This document was electronically signed on November 2, 2018

## 2018-11-02 NOTE — PROGRESS NOTES
"  [facility] DISCHARGE SUMMARY    PATIENT'S NAME: Lance Ibrahim  YOB: 1945  MEDICAL RECORD NUMBER:  [id number]  Place of Service where encounter took place:  [facility] [[id number]]    PRIMARY CARE PROVIDER AND CLINIC RESPONSIBLE AFTER TRANSFER: Anh Spivey [address] [facility] [id number]     TRANSFERRING PROVIDERS: RANJAN Butt CNP, Dr. Abhinav MD  DATE OF SNF ADMISSION:  October / 22 / 2018  DATE OF SNF (anticipated) DISCHARGE: November / 03 / 2018  DISCHARGE DISPOSITION: [facility] Provider   RECENT HOSPITALIZATION/ED:  [facility] stay 10/14/18 to 10/22/18.     CODE STATUS/ADVANCE DIRECTIVES DISCUSSION:   CPR/Full code      Allergies   Allergen Reactions     Penicillins      \"anaphylactic\"     Sulfa Drugs      \"itchy rash, swelling of face and hands\"     Ancef [Cefazolin] Rash     Condition on Discharge:  Stable.  Function:  ADLs SBA, except toileting hygeine mod assist; 200 feet 2WW for SBA; 8 stairs min-mod assist; patient having trouble complying to weight bearing status per therapy  Cognitive Scores: BIMS 15/15 and SLUMS 25/30    Equipment: walker and wheelchair and shower chair    DISCHARGE DIAGNOSIS:   1. Sepsis due to methicillin susceptible Staphylococcus aureus (H)    2. Osteomyelitis of left foot, unspecified type (H)    3. 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 Hyperkalemia    5. Gout of knee, unspecified cause, unspecified chronicity, unspecified laterality    6. Osteoarthritis of both knees, unspecified osteoarthritis type    7. Essential hypertension    8. Type 2 diabetes mellitus with other circulatory complication, without long-term current use of insulin (H)    9. Anemia, unspecified type    10. Physical deconditioning    11. Urticaria secondary to drug reaction        HPI Nursing Facility Course:  HPI information obtained from: facility chart records, facility staff, " patient report and [facility] chart review.    This is a patient driven discharge. Therapy was recommending additional visits as he is just starting to make progress and is still requiring assistance. Medically he has had high potassium levels. Patient refuses to stay as he states he needs to get home to take care of his wife. Social work spoke to his wife and she reports she has had lots of help by family and friends. Home care services were arranged including PHYSICAL THERAPY, OCCUPATIONAL THERAPY, RN, YVETTE, TARA. He was also given a brochure from social work on for hire services including home making services. Home care RN is to check BMP on 11/5.      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 However at time of presentation with worsening cellulitis and redness and signs of sepsis. Imaging showed osteomyelitis. S/p amputation of third toe on 10/15.    Cultures came back positive for MSSA. Was followed by ID during hospitalization. Developed drug reaction to ancef. Completed 10 day course on 10/31. Dressing to stay in place until follow up with podiatry 11/6. To wear boot when up. Continues to remain afebrile since admission.     Hyperkalemia  Potassium elevated today to 5.2 first noted on 10/30. Recheck 11/1 remains at 5.2. Lisinopril is currently on hold. Colchicine was discontinued. He is on low potassium diet and was educated on foods to avoid and foods to focus on until potassium levels are back WNL. BMP to be checked 11/3 prior to discharge. BMP also ordered for 11/5 to be checked by home care RN. Please follow up at next visit.      Gout of knee, unspecified cause, unspecified chronicity, unspecified laterality  Osteoarthritis of both knees, unspecified osteoarthritis type  Patient seen by orthopedics during  hospitalization. His knees were tapped and crystals were found. Completed oral prednisone 10/25. While at the TCU he saw [facility] due to extreme pain. They administered one cortisone injection in his left knee and obtained fluid sample of left knee. The results show no growth. They did not administer an injection in the right knee at the time. He was scheduled to have an injection on 11/5 at the TCU by [facility] PA, but patient is now discharging on 11/3.      He was on colchicine, but this was discontinued due to potassium levels. He is on tylenol PRN and oxycodone PRN. He tells me he has no pain.     Essential hypertension  Continues on PTA carvedilol. Lisinopril on hold due to potassium. So far his BPs have been ok without the lisinopril. Please follow up at follow up visit.      BP: 136-158/74-85 mmHg  P: 77-95 bpm      Type 2 diabetes mellitus with other circulatory complication, without long-term current use of insulin (H)  PTA metformin BID. On consistent carb diet. Was on sliding scale insulin due to prednisone course that he completed, but this was discontinued when prednisone course was complete.           Lab Results   Component Value Date     A1C 6.4 10/17/2018     A1C 6.7 05/01/2018     A1C 6.3 10/07/2017     A1C 6.3 05/15/2017     A1C 5.6 10/12/2016      Anemia, unspecified type  Baseline hgb 11-12. Is on iron supplement.  Hemoglobin   Date Value Ref Range Status   10/30/2018 11.0 (L) 14.0 - 18.0 g/dL Final   10/22/2018 11.1 (L) 13.3 - 17.7 g/dL Final       Physical deconditioning  Lives with his wife in a home. Prior to hospitalization he tells me he was not using an assistive device.  Patient worked with therapy while here. They were recommending additional therapy as noted above. He will discharge with home care services to continue this at home.      Urticaria secondary to drug reaction  Developed urticaria and drug reaction to ancef while in the hospital. Completed prednisone for  reaction on 10/25. Rash is improving.    Admission weight: 248.2 lbs  Current weight: 240.6 lbs      PAST MEDICAL HISTORY:  has a past medical history of Chronic rhinitis; Diabetes mellitus (H); HTN (hypertension); Hypertrophy of prostate with urinary obstruction and other lower urinary tract symptoms (LUTS); TIA (transient ischaemic attack) (1993); and Unspecified transient cerebral ischemia (1993). He also has no past medical history of Basal cell carcinoma; Complication of anesthesia; Malignant hyperthermia; or Squamous cell carcinoma.    DISCHARGE MEDICATIONS:  Current Outpatient Prescriptions   Medication Sig Dispense Refill     Acetaminophen (TYLENOL PO) Take 650 mg by mouth every 6 hours as needed for mild pain or fever       aspirin 325 MG tablet Take 325 mg by mouth daily        blood glucose monitoring (ACCU-CHEK SMARTVIEW) test strip USE TO TEST TWICE DAILY 200 strip 1     carvedilol (COREG) 3.125 MG tablet Take 1 tablet (3.125 mg) by mouth 2 times daily (with meals) 60 tablet 0     diclofenac (VOLTAREN) 1 % GEL topical gel Place 2 g onto the skin 4 times daily Leg and knee (Patient taking differently: Place 4 g onto the skin 4 times daily Leg and knee) 1 Tube 0     ferrous gluconate (FERGON) 324 (38 FE) MG tablet Take 1 tablet by mouth daily.       finasteride (PROSCAR) 5 MG tablet Take 5 mg by mouth daily.       fluticasone (FLONASE) 50 MCG/ACT spray Spray 1 spray into both nostrils 2 times daily       gabapentin (NEURONTIN) 100 MG capsule Take 1 capsule (100 mg) by mouth 2 times daily 60 capsule 0     gabapentin (NEURONTIN) 100 MG capsule Take 2 capsules (200 mg) by mouth At Bedtime 60 capsule 0     loratadine (CLARITIN) 10 MG tablet Take 10 mg by mouth At Bedtime       metFORMIN (GLUCOPHAGE) 1000 MG tablet TAKE 1 TABLET (1,000 MG) BY MOUTH 2 TIMES DAILY (WITH MEALS) **DUE FOR APRIL APPT/LABS. CALL MD** 180 tablet 1     omeprazole (PRILOSEC) 20 MG CR capsule Take 1 capsule (20 mg) by mouth every  morning (before breakfast) 30 capsule 0     OXYCODONE HCL PO Take 5 mg by mouth every 6 hours as needed        tamsulosin (FLOMAX) 0.4 MG 24 hr capsule Take 1 capsule by mouth daily.         MEDICATION CHANGES/RATIONALE:   Colchicine started and stopped.  Lisinopril on hold due to hyperkalemia  Sliding scale insulin stopped after prednisone course completed    Controlled medications sent with patient:   Script for oxycodone medication for 5 tabs and 0 refills given to patient at dischage to have them fill at their out patient pharmacy     ROS:    10 point ROS of systems including Constitutional, Respiratory, Cardiovascular, Gastroenterology, Genitourinary, Integumentary, Musculoskeletal, Neurological, Psychiatric were all negative except for pertinent positives noted in my HPI.    Physical Exam:   Vitals: /83  Pulse 89  Temp 97.5  F (36.4  C)  Resp 16  Ht 6' (1.829 m)  Wt 240 lb 9.6 oz (109.1 kg)  SpO2 98%  BMI 32.63 kg/m2  BMI= Body mass index is 32.63 kg/(m^2).  GENERAL APPEARANCE:  Alert, pleasant and cooperative, sitting on edge of bed on exam today  EYES:  EOM, lids, pupils and irises normal, sclera clear and conjunctiva normal, no discharge or mattering on lids or lashes noted  ENT:  Mouth normal, moist mucous membranes, nose without drainage or crusting, external ears without lesions  RESP:  respiratory effort normal, no respiratory distress, Lung sounds clear, patient is on RA  CV: auscultation of heart done, rate and rhythm regular. generalized edema to BLE  ABDOMEN:  normal bowel sounds, soft, nontender, no grimacing or guarding with palpation.  M/S:  with tenderness to bilateral knees; able to move all extremities but limited movement to knees due to pain.   SKIN:  Inspection and palpation of skin and subcutaneous tissue: skin warm, dry without rashes, left third toe bandaged- no drainage noted through dressing, urticarial rash to bilateral arms and back healing and erythema fading  NEURO:  cranial nerves 2-12 grossly intact, no facial asymmetry, no speech deficits and able to follow directions, moves all extremities symmetrically  PSYCH:  insight and judgement intact, memory intact, affect and mood normal      DISCHARGE PLAN:  Occupational Therapy, Physical Therapy, Registered Nurse, Home Health Aide,  and From:  [facility]  Patient instructed to follow-up with:  PCP in less than 7 days      Current [facility] scheduled appointments:  Future Appointments  Date Time Provider Department Center   11/6/2018 4:30 PM Ayaka Azevedo DPM, Podiatry/Foot and Ankle Surgery [facility]       MTM referral needed and placed by this provider:     Pending labs: BMP 11/3  SNF labs     CBC RESULTS:   Recent Labs   Lab Test 10/30/18  10/22/18   0628   WBC  10.2  14.5*   RBC  3.87*  3.92*   HGB  11.0*  11.1*   HCT  36.6*  36.0*   MCV  95  92   MCH  28.4  28.3   MCHC  30.1*  30.8*   RDW  16.5*  15.7*   PLT  535*  488*       Last Basic Metabolic Panel:  Recent Labs   Lab Test 10/30/18  10/22/18   0628   NA  137  133   POTASSIUM  5.2*  4.9   CHLORIDE  105  99   SHANNAN  9.7  9.4   CO2  19*  27   BUN  40*  30   CR  1.08  1.01   GLC  91  166*       Lab Results   Component Value Date    A1C 6.4 10/17/2018    A1C 6.7 05/01/2018       Discharge Treatments:  Follow up with PCP, podiatry.  Home care services as above    TOTAL DISCHARGE TIME:   Greater than 30 minutes  Electronically signed by:  RANJAN Butt CNP

## 2018-11-02 NOTE — LETTER
"    11/2/2018        RE: Lance Ibrahim  [address] [place] [address]          [facility] DISCHARGE SUMMARY    PATIENT'S NAME: Lance Ibrahim  YOB: 1945  MEDICAL RECORD NUMBER:  [id number]  Place of Service where encounter took place:  [facility] [[id number]]    PRIMARY CARE PROVIDER AND CLINIC RESPONSIBLE AFTER TRANSFER: Fuller, Sherri Rubin [address] [facility] / [place] [address]     TRANSFERRING PROVIDERS: RANJAN Butt CNP, Dr. Abhinav MD  DATE OF SNF ADMISSION:  October / 22 / 2018  DATE OF SNF (anticipated) DISCHARGE: November / 03 / 2018  DISCHARGE DISPOSITION: [facility] Provider   RECENT HOSPITALIZATION/ED:  [facility] stay 10/14/18 to 10/22/18.     CODE STATUS/ADVANCE DIRECTIVES DISCUSSION:   CPR/Full code      Allergies   Allergen Reactions     Penicillins      \"anaphylactic\"     Sulfa Drugs      \"itchy rash, swelling of face and hands\"     Ancef [Cefazolin] Rash     Condition on Discharge:  Stable.  Function:  ADLs SBA, except toileting hygeine mod assist; 200 feet 2WW for SBA; 8 stairs min-mod assist; patient having trouble complying to weight bearing status per therapy  Cognitive Scores: BIMS 15/15 and SLUMS 25/30    Equipment: walker and wheelchair and shower chair    DISCHARGE DIAGNOSIS:   1. Sepsis due to methicillin susceptible Staphylococcus aureus (H)    2. Osteomyelitis of left foot, unspecified type (H)    3. 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 Hyperkalemia    5. Gout of knee, unspecified cause, unspecified chronicity, unspecified laterality    6. Osteoarthritis of both knees, unspecified osteoarthritis type    7. Essential hypertension    8. Type 2 diabetes mellitus with other circulatory complication, without long-term current use of insulin (H)    9. Anemia, unspecified type    10. Physical deconditioning    11. Urticaria secondary to drug reaction        HPI Nursing " Facility Course:  HPI information obtained from: facility chart records, facility staff, patient report and [facility] chart review.    This is a patient driven discharge. Therapy was recommending additional visits as he is just starting to make progress and is still requiring assistance. Medically he has had high potassium levels. Patient refuses to stay as he states he needs to get home to take care of his wife. Social work spoke to his wife and she reports she has had lots of help by family and friends. Home care services were arranged including PHYSICAL THERAPY, OCCUPATIONAL THERAPY, RN, YVETTE, TARA. He was also given a brochure from [employer] on for hire services including home making services. Home care RN is to check BMP on 11/5.      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 However at time of presentation with worsening cellulitis and redness and signs of sepsis. Imaging showed osteomyelitis. S/p amputation of third toe on 10/15.    Cultures came back positive for MSSA. Was followed by ID during hospitalization. Developed drug reaction to ancef. Completed 10 day course on 10/31. Dressing to stay in place until follow up with podiatry 11/6. To wear boot when up. Continues to remain afebrile since admission.     Hyperkalemia  Potassium elevated today to 5.2 first noted on 10/30. Recheck 11/1 remains at 5.2. Lisinopril is currently on hold. Colchicine was discontinued. He is on low potassium diet and was educated on foods to avoid and foods to focus on until potassium levels are back WNL. BMP to be checked 11/3 prior to discharge. BMP also ordered for 11/5 to be checked by home care RN. Please follow up at next visit.      Gout of knee, unspecified cause, unspecified chronicity, unspecified laterality  Osteoarthritis of  both knees, unspecified osteoarthritis type  Patient seen by orthopedics during hospitalization. His knees were tapped and crystals were found. Completed oral prednisone 10/25. While at the TCU he saw [facility] due to extreme pain. They administered one cortisone injection in his left knee and obtained fluid sample of left knee. The results show no growth. They did not administer an injection in the right knee at the time. He was scheduled to have an injection on 11/5 at the TCU by [facility] PA, but patient is now discharging on 11/3.      He was on colchicine, but this was discontinued due to potassium levels. He is on tylenol PRN and oxycodone PRN. He tells me he has no pain.     Essential hypertension  Continues on PTA carvedilol. Lisinopril on hold due to potassium. So far his BPs have been ok without the lisinopril. Please follow up at follow up visit.      BP: 136-158/74-85 mmHg  P: 77-95 bpm      Type 2 diabetes mellitus with other circulatory complication, without long-term current use of insulin (H)  PTA metformin BID. On consistent carb diet. Was on sliding scale insulin due to prednisone course that he completed, but this was discontinued when prednisone course was complete.           Lab Results   Component Value Date     A1C 6.4 10/17/2018     A1C 6.7 05/01/2018     A1C 6.3 10/07/2017     A1C 6.3 05/15/2017     A1C 5.6 10/12/2016      Anemia, unspecified type  Baseline hgb 11-12. Is on iron supplement.  Hemoglobin   Date Value Ref Range Status   10/30/2018 11.0 (L) 14.0 - 18.0 g/dL Final   10/22/2018 11.1 (L) 13.3 - 17.7 g/dL Final       Physical deconditioning  Lives with his wife in a home. Prior to hospitalization he tells me he was not using an assistive device.   Patient worked with therapy while here. They were recommending additional therapy as noted above. He will discharge with home care services to continue this at home.      Urticaria secondary to drug reaction  Developed urticaria  and drug reaction to ancef while in the hospital. Completed prednisone for reaction on 10/25. Rash is improving.    Admission weight: 248.2 lbs  Current weight: 240.6 lbs      PAST MEDICAL HISTORY:  has a past medical history of Chronic rhinitis; Diabetes mellitus (H); HTN (hypertension); Hypertrophy of prostate with urinary obstruction and other lower urinary tract symptoms (LUTS); TIA (transient ischaemic attack) (1993); and Unspecified transient cerebral ischemia (1993). He also has no past medical history of Basal cell carcinoma; Complication of anesthesia; Malignant hyperthermia; or Squamous cell carcinoma.    DISCHARGE MEDICATIONS:  Current Outpatient Prescriptions   Medication Sig Dispense Refill     Acetaminophen (TYLENOL PO) Take 650 mg by mouth every 6 hours as needed for mild pain or fever       aspirin 325 MG tablet Take 325 mg by mouth daily        blood glucose monitoring (ACCU-CHEK SMARTVIEW) test strip USE TO TEST TWICE DAILY 200 strip 1     carvedilol (COREG) 3.125 MG tablet Take 1 tablet (3.125 mg) by mouth 2 times daily (with meals) 60 tablet 0     COLCHICINE PO Take 0.6 mg by mouth every other day        diclofenac (VOLTAREN) 1 % GEL topical gel Place 2 g onto the skin 4 times daily Leg and knee (Patient taking differently: Place 4 g onto the skin 4 times daily Leg and knee) 1 Tube 0     ferrous gluconate (FERGON) 324 (38 FE) MG tablet Take 1 tablet by mouth daily.       finasteride (PROSCAR) 5 MG tablet Take 5 mg by mouth daily.       fluticasone (FLONASE) 50 MCG/ACT spray Spray 1 spray into both nostrils 2 times daily       gabapentin (NEURONTIN) 100 MG capsule Take 1 capsule (100 mg) by mouth 2 times daily 60 capsule 0     gabapentin (NEURONTIN) 100 MG capsule Take 2 capsules (200 mg) by mouth At Bedtime 60 capsule 0     loratadine (CLARITIN) 10 MG tablet Take 10 mg by mouth At Bedtime       metFORMIN (GLUCOPHAGE) 1000 MG tablet TAKE 1 TABLET (1,000 MG) BY MOUTH 2 TIMES DAILY (WITH MEALS)  **DUE FOR APRIL APPT/LABS. CALL MD** 180 tablet 1     omeprazole (PRILOSEC) 20 MG CR capsule Take 1 capsule (20 mg) by mouth every morning (before breakfast) 30 capsule 0     OXYCODONE HCL PO Take 5 mg by mouth every 6 hours as needed        tamsulosin (FLOMAX) 0.4 MG 24 hr capsule Take 1 capsule by mouth daily.         MEDICATION CHANGES/RATIONALE:   Colchicine started and stopped.  Lisinopril on hold due to hyperkalemia  Sliding scale insulin stopped after prednisone course completed    Controlled medications sent with patient:   Script for oxycodone medication for 5 tabs and 0 refills given to patient at dischage to have them fill at their out patient pharmacy     ROS:    10 point ROS of systems including Constitutional, Respiratory, Cardiovascular, Gastroenterology, Genitourinary, Integumentary, Musculoskeletal, Neurological, Psychiatric were all negative except for pertinent positives noted in my HPI.    Physical Exam:   Vitals: /83  Pulse 89  Temp 97.5  F (36.4  C)  Resp 16  Ht 6' (1.829 m)  Wt 240 lb 9.6 oz (109.1 kg)  SpO2 98%  BMI 32.63 kg/m2  BMI= Body mass index is 32.63 kg/(m^2).  GENERAL APPEARANCE:  Alert, pleasant and cooperative, sitting on edge of bed on exam today  EYES:  EOM, lids, pupils and irises normal, sclera clear and conjunctiva normal, no discharge or mattering on lids or lashes noted  ENT:  Mouth normal, moist mucous membranes, nose without drainage or crusting, external ears without lesions  RESP:  respiratory effort normal, no respiratory distress, Lung sounds clear, patient is on RA  CV: auscultation of heart done, rate and rhythm regular. generalized edema to BLE  ABDOMEN:  normal bowel sounds, soft, nontender, no grimacing or guarding with palpation.  M/S:  with tenderness to bilateral knees; able to move all extremities but limited movement to knees due to pain.   SKIN:  Inspection and palpation of skin and subcutaneous tissue: skin warm, dry without rashes, left third  toe bandaged- no drainage noted through dressing, urticarial rash to bilateral arms and back healing and erythema fading  NEURO: cranial nerves 2-12 grossly intact, no facial asymmetry, no speech deficits and able to follow directions, moves all extremities symmetrically  PSYCH:  insight and judgement intact, memory intact, affect and mood normal      DISCHARGE PLAN:  Occupational Therapy, Physical Therapy, Registered Nurse, Home Health Aide,  and From:  [facility] Care  Patient instructed to follow-up with:  PCP in less than 7 days      Current [place] scheduled appointments:  Future Appointments  Date Time Provider Department Center   11/6/2018 4:30 PM Ayaka Azevedo DPM, Podiatry/Foot and Ankle Surgery [facility]       MTM referral needed and placed by this provider:     Pending labs: [facility] 11/3  [facility] labs     CBC RESULTS:   Recent Labs   Lab Test 10/30/18  10/22/18   0628   WBC  10.2  14.5*   RBC  3.87*  3.92*   HGB  11.0*  11.1*   HCT  36.6*  36.0*   MCV  95  92   MCH  28.4  28.3   MCHC  30.1*  30.8*   RDW  16.5*  15.7*   PLT  535*  488*       Last Basic Metabolic Panel:  Recent Labs   Lab Test 10/30/18  10/22/18   0628   NA  137  133   POTASSIUM  5.2*  4.9   CHLORIDE  105  99   SHANNAN  9.7  9.4   CO2  19*  27   BUN  40*  30   CR  1.08  1.01   GLC  91  166*       Lab Results   Component Value Date    A1C 6.4 10/17/2018    A1C 6.7 05/01/2018       Discharge Treatments:  Follow up with PCP, podiatry.  Home care services as above    TOTAL DISCHARGE TIME:   Greater than 30 minutes  Electronically signed by:  RANJAN Butt CNP      Sincerely,        RANJAN Butt CNP

## 2018-11-02 NOTE — Clinical Note
Patient discharging from TCU tomorrow. Discharge is patient driven. He has had elevated potassium levels- lisinopril currently on hold. He will have BMP checked on 11/5 by home care. He should follow up with you early next week. He is being set up with home care services.

## 2018-11-03 ENCOUNTER — RECORDS - HEALTHEAST (OUTPATIENT)
Dept: LAB | Facility: CLINIC | Age: 73
End: 2018-11-03

## 2018-11-03 LAB
ANION GAP SERPL CALCULATED.3IONS-SCNC: 12 MMOL/L (ref 5–18)
BUN SERPL-MCNC: 27 MG/DL (ref 8–28)
CALCIUM SERPL-MCNC: 9.6 MG/DL (ref 8.5–10.5)
CHLORIDE BLD-SCNC: 109 MMOL/L (ref 98–107)
CO2 SERPL-SCNC: 20 MMOL/L (ref 22–31)
CREAT SERPL-MCNC: 0.98 MG/DL (ref 0.7–1.3)
GFR SERPL CREATININE-BSD FRML MDRD: >60 ML/MIN/1.73M2
GLUCOSE BLD-MCNC: 106 MG/DL (ref 70–125)
POTASSIUM BLD-SCNC: 5.2 MMOL/L (ref 3.5–5)
SODIUM SERPL-SCNC: 141 MMOL/L (ref 136–145)

## 2018-11-04 ENCOUNTER — NURSE TRIAGE (OUTPATIENT)
Dept: NURSING | Facility: CLINIC | Age: 73
End: 2018-11-04

## 2018-11-04 NOTE — TELEPHONE ENCOUNTER
Maggy, from [facility], seeking the name of a physician who works with RANJAN Strickland to put on patient's home care orders.  Gave her Dr. Shyla Barrett's name.      Additional Information    General information question, no triage required and triager able to answer question    Protocols used: INFORMATION ONLY CALL-ADULT-

## 2018-11-05 ENCOUNTER — TELEPHONE (OUTPATIENT)
Dept: INTERNAL MEDICINE | Facility: CLINIC | Age: 73
End: 2018-11-05

## 2018-11-05 ENCOUNTER — PATIENT OUTREACH (OUTPATIENT)
Dept: INTERNAL MEDICINE | Facility: CLINIC | Age: 73
End: 2018-11-05

## 2018-11-05 DIAGNOSIS — G45.9 TRANSIENT CEREBRAL ISCHEMIA, UNSPECIFIED TYPE: ICD-10-CM

## 2018-11-05 DIAGNOSIS — Z63.6 CAREGIVER STRESS: ICD-10-CM

## 2018-11-05 DIAGNOSIS — Z79.4 TYPE 2 DIABETES MELLITUS WITH HYPEROSMOLARITY WITHOUT COMA, WITH LONG-TERM CURRENT USE OF INSULIN (H): ICD-10-CM

## 2018-11-05 DIAGNOSIS — S98.139A AMPUTATED TOE, UNSPECIFIED LATERALITY (H): Primary | ICD-10-CM

## 2018-11-05 DIAGNOSIS — E11.00 TYPE 2 DIABETES MELLITUS WITH HYPEROSMOLARITY WITHOUT COMA, WITH LONG-TERM CURRENT USE OF INSULIN (H): ICD-10-CM

## 2018-11-05 LAB
ANION GAP SERPL CALCULATED.3IONS-SCNC: 8 MMOL/L (ref 3–14)
BUN SERPL-MCNC: 36 MG/DL (ref 7–30)
CALCIUM SERPL-MCNC: 9 MG/DL (ref 8.5–10.1)
CHLORIDE SERPL-SCNC: 110 MMOL/L (ref 94–109)
CO2 SERPL-SCNC: 21 MMOL/L (ref 20–32)
CREAT SERPL-MCNC: 1.39 MG/DL (ref 0.66–1.25)
GFR SERPL CREATININE-BSD FRML MDRD: 50 ML/MIN/1.7M2
GLUCOSE SERPL-MCNC: 138 MG/DL (ref 70–99)
POTASSIUM SERPL-SCNC: 4.7 MMOL/L (ref 3.4–5.3)
SODIUM SERPL-SCNC: 139 MMOL/L (ref 133–144)

## 2018-11-05 PROCEDURE — 80048 BASIC METABOLIC PNL TOTAL CA: CPT | Performed by: NURSE PRACTITIONER

## 2018-11-05 SDOH — SOCIAL STABILITY - SOCIAL INSECURITY: DEPENDENT RELATIVE NEEDING CARE AT HOME: Z63.6

## 2018-11-05 NOTE — TELEPHONE ENCOUNTER
IP F/U    Date: 11/03/18  Diagnosis: Sepsis Due To Methicillin Susceptible Staphylococcus Aureus  Is patient active in care coordination? No. Potential.  Was patient in TCU? No

## 2018-11-05 NOTE — TELEPHONE ENCOUNTER
"Tiera RN case management at [facility] [employer]'s insurance ([phone]) is calling.  She has concerns about patient's health as he is primary caregiver to his bed bound wife, he works full time and \"has his plate full\".  He was recently hospitalized then went to TCU at [facility] discharging from there on 11/3/18.  Family members were coming in and out to help care for the wife.  Someone filed a VS report while pt was in TCU.  Tiera is wondering if patient and his wife could get plugged in to receive information on any services or programs they may qualify for.  Wife not seen at this clinic for 2 years.  KILEY Chavez R.N.    "

## 2018-11-05 NOTE — PROGRESS NOTES
Clinic Care Coordination Contact  Care Coordination Communication    Referral Source: SNF/TCU    Clinical Data: Epic and [employer] charting reviewed.    While IP, SW filed vulnerable adult report #[id number] due to pt being the primary caregiver for his wife, who is bed bound at home without adequate cares/supports.    DATE OF SNF ADMISSION:  October / 22 / 2018  DATE OF SNF (anticipated) DISCHARGE: November / 03 / 2018  DISCHARGE DISPOSITION: [facility] Provider   RECENT HOSPITALIZATION/ED:  [facility] stay 10/14/18 to 10/22/18.      This is a patient driven discharge. Therapy was recommending additional visits as he is just starting to make progress and is still requiring assistance. Medically he has had high potassium levels. Patient refuses to stay as he states he needs to get home to take care of his wife. Social work spoke to his wife and she reports she has had lots of help by family and friends. Home care services were arranged including PHYSICAL THERAPY, OCCUPATIONAL THERAPY, RN, YVETTE, TARA. He was also given a brochure from social work on for hire services including home making services. Home care RN is to check BMP on 11/5.    Home Care Contact:              Home Care Agency: [facility] RN/PT/OT/YVETTE/TARA              Contact name () and phone number: NOT ASSIGNED              Care Coordination contacted home care: No              Anticipated start of care date: 11/5/18    Patient Contact:               Introduced self and role of care coordination.               Discharge instructions were reviewed with patient/caregiver. Patient states he is doing well.  Home care RN had just left and he feels that visit went well.  Patient states he needed to get home to take care of his wife. He also noted he was out of PTO and needed to get back to work, which he is doing remotely at home for now.              Do you have any questions about your medications? No.              Follow up  appointment is scheduled for 11/8/18 @4pm.              Provided 24 Hour Nurse Line and/or 24 Hour Appointment Scheduling: Yes              Home care has contacted patient: Yes              Patient questions/concerns: none at this time    Plan: RN/SW Care Coordinator will await notification from home care staff informing RN/SW Care Coordinator of patients discharge plans/needs. RN/SW Care Coordinator will review chart and outreach to home care every 4 weeks and as needed.      Audrey Ignacio RN-BSN   Care Coordination  Phone:  [phone]  Email: [email or web address]

## 2018-11-05 NOTE — TELEPHONE ENCOUNTER
Please advise referral done for care coordination.  His wife should establish PCP for her health needs.  Anh Spivey CNP

## 2018-11-06 ENCOUNTER — OFFICE VISIT (OUTPATIENT)
Dept: PODIATRY | Facility: CLINIC | Age: 73
End: 2018-11-06
Payer: COMMERCIAL

## 2018-11-06 ENCOUNTER — TRANSFERRED RECORDS (OUTPATIENT)
Dept: HEALTH INFORMATION MANAGEMENT | Facility: CLINIC | Age: 73
End: 2018-11-06

## 2018-11-06 VITALS
HEIGHT: 72 IN | WEIGHT: 250 LBS | SYSTOLIC BLOOD PRESSURE: 128 MMHG | DIASTOLIC BLOOD PRESSURE: 68 MMHG | BODY MASS INDEX: 33.86 KG/M2

## 2018-11-06 DIAGNOSIS — E11.42 DIABETIC POLYNEUROPATHY ASSOCIATED WITH TYPE 2 DIABETES MELLITUS (H): Primary | ICD-10-CM

## 2018-11-06 DIAGNOSIS — R60.0 BILATERAL EDEMA OF LOWER EXTREMITY: ICD-10-CM

## 2018-11-06 DIAGNOSIS — Z98.890 POST-OPERATIVE STATE: ICD-10-CM

## 2018-11-06 DIAGNOSIS — M21.42 PES PLANUS OF BOTH FEET: ICD-10-CM

## 2018-11-06 DIAGNOSIS — Z89.422 S/P AMPUTATION OF LESSER TOE, LEFT (H): ICD-10-CM

## 2018-11-06 DIAGNOSIS — M21.41 PES PLANUS OF BOTH FEET: ICD-10-CM

## 2018-11-06 DIAGNOSIS — L97.322 ULCER OF LEFT ANKLE, WITH FAT LAYER EXPOSED (H): ICD-10-CM

## 2018-11-06 PROCEDURE — 99212 OFFICE O/P EST SF 10 MIN: CPT | Mod: 24 | Performed by: PODIATRIST

## 2018-11-06 PROCEDURE — 99024 POSTOP FOLLOW-UP VISIT: CPT | Performed by: PODIATRIST

## 2018-11-06 RX ORDER — DIPHENHYDRAMINE HCL 25 MG
1 TABLET ORAL DAILY
Qty: 1 TUBE | Refills: 2 | Status: ON HOLD | OUTPATIENT
Start: 2018-11-06 | End: 2018-12-10

## 2018-11-06 NOTE — LETTER
11/6/2018         RE: Lance Ibrahim  [address] [place] [address]        Dear Colleague,    Thank you for referring your patient, Lance Ibrahim, to the [facility]. Please see a copy of my visit note below.    PATIENT HISTORY:   Lance Ibrahim is a 73 year old male who presents to clinic for follow up from inpatient consult and surgery for left 3rd toe amputation. This was done a month ago. Also notes new ulceration to the left ankle. Started about 2 weeks ago. Rubbed in his cast boot he was given after surgery. No pain to left foot. Notes he had a gout attack to both knees and can hardly walk due to that. Denies fever, chills, nausa.     Review of Systems:  Patient denies fever, chills, rash, wound, stiffness, limping,  weakness, heart burn, blood in stool, chest pain with activity, calf pain when walking, shortness of breath with activity, chronic cough, easy bleeding/bruising, excessive thirst, fatigue, depression, anxiety.  Patient admits to swelling, numbness.     PAST MEDICAL HISTORY:   Past Medical History:   Diagnosis Date     Chronic rhinitis      Diabetes mellitus (H)      HTN (hypertension)      Hypertrophy of prostate with urinary obstruction and other lower urinary tract symptoms (LUTS)      TIA (transient ischaemic attack) 1993     Unspecified transient cerebral ischemia 1993    No definite source found        PAST SURGICAL HISTORY:   Past Surgical History:   Procedure Laterality Date     AMPUTATE TOE(S) Left 10/15/2018    Procedure: AMPUTATE TOE(S);  Left third toe amputation;  Surgeon: Ayaka Azevedo DPM, Podiatry/Foot and Ankle Surgery;  Location: [facility] OR     C NONSPECIFIC PROCEDURE  1985    Diastasis recti repair     C NONSPECIFIC PROCEDURE  1987    Ventral hernia repair     C NONSPECIFIC PROCEDURE      ORIF of left shoulder     COLONOSCOPY  3/9/2013    Procedure: COLONOSCOPY;  COLONOSCOPY;  Surgeon: Chau Hogan MD;  Location:  GI     EYE SURGERY  03/13/2017     left eye     FOOT SURGERY  4/2013    cyst removal      HERNIA REPAIR  7/13/2004    ventral         MEDICATIONS:   Current Outpatient Prescriptions:      Acetaminophen (TYLENOL PO), Take 650 mg by mouth every 6 hours as needed for mild pain or fever, Disp: , Rfl:      aspirin 325 MG tablet, Take 325 mg by mouth daily , Disp: , Rfl:      blood glucose monitoring (ACCU-CHEK SMARTVIEW) test strip, USE TO TEST TWICE DAILY, Disp: 200 strip, Rfl: 1     carvedilol (COREG) 3.125 MG tablet, Take 1 tablet (3.125 mg) by mouth 2 times daily (with meals), Disp: 60 tablet, Rfl: 0     diclofenac (VOLTAREN) 1 % GEL topical gel, Place 2 g onto the skin 4 times daily Leg and knee (Patient taking differently: Place 4 g onto the skin 4 times daily Leg and knee), Disp: 1 Tube, Rfl: 0     ferrous gluconate (FERGON) 324 (38 FE) MG tablet, Take 1 tablet by mouth daily., Disp: , Rfl:      finasteride (PROSCAR) 5 MG tablet, Take 5 mg by mouth daily., Disp: , Rfl:      fluticasone (FLONASE) 50 MCG/ACT spray, Spray 1 spray into both nostrils 2 times daily, Disp: , Rfl:      gabapentin (NEURONTIN) 100 MG capsule, Take 1 capsule (100 mg) by mouth 2 times daily, Disp: 60 capsule, Rfl: 0     gabapentin (NEURONTIN) 100 MG capsule, Take 2 capsules (200 mg) by mouth At Bedtime, Disp: 60 capsule, Rfl: 0     loratadine (CLARITIN) 10 MG tablet, Take 10 mg by mouth At Bedtime, Disp: , Rfl:      metFORMIN (GLUCOPHAGE) 1000 MG tablet, TAKE 1 TABLET (1,000 MG) BY MOUTH 2 TIMES DAILY (WITH MEALS) **DUE FOR APRIL APPT/LABS. CALL MD**, Disp: 180 tablet, Rfl: 1     omeprazole (PRILOSEC) 20 MG CR capsule, Take 1 capsule (20 mg) by mouth every morning (before breakfast), Disp: 30 capsule, Rfl: 0     OXYCODONE HCL PO, Take 5 mg by mouth every 6 hours as needed , Disp: , Rfl:      tamsulosin (FLOMAX) 0.4 MG 24 hr capsule, Take 1 capsule by mouth daily., Disp: , Rfl:      Wound Dressings ([employer] HONEY WOUND) GEL, Externally apply 1 mL topically daily, Disp: 1  "Tube, Rfl: 2     ALLERGIES:    Allergies   Allergen Reactions     Penicillins      \"anaphylactic\"     Sulfa Drugs      \"itchy rash, swelling of face and hands\"     Ancef [Cefazolin] Rash        SOCIAL HISTORY:   Social History     Social History     Marital status:      Spouse name: N/A     Number of children: N/A     Years of education: N/A     Occupational History      Self     Social History Main Topics     Smoking status: Former Smoker     Quit date: 3/17/1993     Smokeless tobacco: Never Used     Alcohol use Yes      Comment: Occ, Once a month     Drug use: No     Sexual activity: No     Other Topics Concern     Not on file     Social History Narrative        FAMILY HISTORY:   Family History   Problem Relation Age of Onset     Family History Negative Mother       86 yo     Cancer Father       74 yo brain     Diabetes Maternal Grandfather       89 yo     Alcohol/Drug Paternal Grandfather              EXAM:Vitals: /68  Ht 6' (1.829 m)  Wt 250 lb (113.4 kg)  BMI 33.91 kg/m2  BMI= Body mass index is 33.91 kg/(m^2).    General appearance: Patient is alert and fully cooperative with history & exam.  No sign of distress is noted during the visit.     Psychiatric: Affect is pleasant & appropriate.  Patient appears motivated to improve health.     Respiratory: Breathing is regular & unlabored while sitting.     HEENT: Hearing is intact to spoken word.  Speech is clear.  No gross evidence of visual impairment that would impact ambulation.     Dermatologic: sutures intact. Skin is well approximated. New ulceration to the medial left ankle. Measures 0.6cm x 0.6cm x 0.1cm.      Vascular: DP & PT pulses are intact & regular bilaterally.   edema and varicosities noted.  CFT and skin temperature is normal to both lower extremities.     Neurologic: Lower extremity sensation is diminished to both feet.      Musculoskeletal: Patient is ambulatory without assistive device or brace. " Amputation of left 3rd toe.     A1C: 6.4 (10/2018)     ASSESSMENT:    Diabetic polyneuropathy associated with type 2 diabetes mellitus (H)  Post-operative state  Ulcer of left ankle, with fat layer exposed (H)  Bilateral edema of lower extremity  Pes planus of both feet  S/P amputation of lesser toe, left (H)     PLAN:  Reviewed patient's chart in epic. Sutures were removed to left foot. Recommend wound gel daily to the left ankle ulcer. He can shower and get the foot wet. Can go back to regular shoes and brace. Recommend follow up in 1 month for reassessment of ulcer.        Aayka Azevedo DPM, Podiatry/Foot and Ankle Surgery    Weight management plan: Patient was referred to their PCP to discuss a diet and exercise plan.      Again, thank you for allowing me to participate in the care of your patient.        Sincerely,        Ayaka Azevedo DPM, Podiatry/Foot and Ankle Surgery

## 2018-11-06 NOTE — MR AVS SNAPSHOT
After Visit Summary   11/6/2018    Lance Ibrahim    MRN: [id number]           Patient Information     Date Of Birth          1945        Visit Information        Provider Department      11/6/2018 4:30 PM Ayaka Azevedo DPM, Podiatry/Foot and Ankle Surgery [facility]        Today's Diagnoses     Diabetic polyneuropathy associated with type 2 diabetes mellitus (H)    -  1    Post-operative state        Ulcer of left ankle, with fat layer exposed (H)        Bilateral edema of lower extremity        Pes planus of both feet          Care Instructions    Thank you for choosing [facility]!    DR. AZEVEDO'S CLINIC SCHEDULE  MONDAY AM - [place] TUESDAY - [place]   [address] [address] [place] [address] [place] [address]   [phone] / [phone] / [phone]       WEDNESDAY - [place] FRIDAY AM - WOUND CENTER   [address]   [place] [address] [place] [address]   [phone] / [phone]       FRIDAY PM - [place] SCHEDULE SURGERY: [phone] [address] BILLING QUESTIONS: [phone]   [place] [address] AFTER HOURS: [phone]   [phone] / [phone] APPOINTMENTS: [phone]     Consumer Price Line (CPL) [phone]       FOOT ULCER (WOUND) EDUCATION  Ulceration ofthe foot involves a break or hole in the skin. Skin is our best protection against infection. Skin is quite durable, however, the underlying tissues are fragile. For this reason, the wound is likely to deepen rapidly. Deep wounds usually get infected and require amputation. Prompt healing is therefore essential to avoid limb loss.     Foot ulcers do not heal without intervention. Walking on the foot and living your normal life is not typically compatible with healing the sore. Successful healing will require several months of significant alteration of your daily  activities.   Ulcer complications frequently develop. This primarily includes infection of skin, which then spreads deep into your joints, bones and tendons. Spreading infection may travel up your leg and into other parts of your body. Deep infection is usually treated with amputation ofpart ofyour foot or your leg. Signs of infection include fever, chills, nausea, vomiting, erratic blood sugars, local redness, pus, strong odor and localized warmth. Signs of infection should be taken seriously. Prompt evaluation in the clinic or hospital emergency room is required.   Ulcer treatment requires debridement or surgical removal of devitalized tissue. Your doctor will trim away callused, moistened, unhealthy tissue from the wound surface and margin. This helps to clean the wound and allows proper inspection. Debridement also stimulates healing even though the wound originally appears larger. Expect some bleeding with each debridement. You will be given instruction regarding wound bandaging. This often includes ointment and gauze. Avoid tape directly on the skin. Hand washing is essential since most infections will come from your fingertips. Ulcer care requires a no touch technique. Your fingers should not touch the margin or base of the wound.    HELPFUL HEALING TIPS:  1. Debridement: Getting rid of bad tissue makes way for good tissue to promote healing  2. Addressing Foot Deformities: Hammertoes and bunions can cause increased pressure  3. Pressure Reduction: If pressure remains to the wound, it won't heal  4. Good Pulses: If bloodflow is not getting to the foot, the ulcer will not heal  5. Good Nutrition: If you are not getting proper nutrition your body can't heal.Protein!  6. Infection Control: Keep the ulcer clean with wound cleanser. DO NOT SOAK IT!  7. Moisture Control: Keep edema down and make sure that drainage is getting pulled away from the ulcer    IMPORTANCE OF DEBRIDEMENT    Reduces bioburden to help  control or reduce infection. Even if an ulcer is not  infected,  the bacterial bioburden causes increased local inflammation.    Allows more accurate visualization of the wound base and edges, which allows for more precise staging.    Removes necrotic/non-viable tissue, which impedes wound healing, causes protein loss and can be a nidus for infection.    Stimulates new circulation (angiogenesis) and allows adequate oxygen delivery to the wound.    Removes undermining and tunneling, and may help reduce abscess formation.    Releases healing growth factors at the edge of the wound.    Prepares the wound bed by leaving only tissues that are capable of regenerating.      One month follow up      Body Mass Index (BMI)  Many things can cause foot and ankle problems. Foot structure, activity level, foot mechanics and injuries are common causes of pain.  One very important issue that often goes unmentioned, is body weight. Extra weight can cause increased stress on muscles, ligaments, bones and tendons.  Sometimes just a few extra pounds is all it takes to put one over her/his threshold. Without reducing that stress, it can be difficult to alleviate pain. Some people are uncomfortable addressing this issue, but we feel it is important for you to think about it. As Foot &  Ankle specialists, our job is addressing the lower extremity problem and possible causes. Regarding extra body weight, we encourage patients to discuss diet and weight management plans with their primary care doctors. It is this team approach that gives you the best opportunity for pain relief and getting you back on your feet.                  Follow-ups after your visit        Your next 10 appointments already scheduled     Nov 08, 2018  4:00 PM CST   Office Visit with Anh Spivey NP   [facility] ([facility]    [address]  [facility] [id number] [phone]           Bring a current list of  meds and any records pertaining to this visit. For Physicals, please bring immunization records and any forms needing to be filled out. Please arrive 10 minutes early to complete paperwork.              Who to contact     If you have questions or need follow up information about today's clinic visit or your schedule please contact [facility] directly at [phone].  Normal or non-critical lab and imaging results will be communicated to you by MyChart, letter or phone within 4 business days after the clinic has received the results. If you do not hear from us within 7 days, please contact the clinic through MyChart or phone. If you have a critical or abnormal lab result, we will notify you by phone as soon as possible.  Submit refill requests through [email or web address] or call your pharmacy and they will forward the refill request to us. Please allow 3 business days for your refill to be completed.          Additional Information About Your Visit        Care EveryWhere ID     This is your Care EveryWhere ID. This could be used by other organizations to access your [place] medical records  [id number]        Your Vitals Were     Height BMI (Body Mass Index)                6' (1.829 m) 33.91 kg/m2           Blood Pressure from Last 3 Encounters:   11/06/18 128/68   11/02/18 145/83   10/30/18 158/74    Weight from Last 3 Encounters:   11/06/18 250 lb (113.4 kg)   11/02/18 240 lb 9.6 oz (109.1 kg)   10/30/18 248 lb (112.5 kg)              Today, you had the following     No orders found for display         Today's Medication Changes          These changes are accurate as of 11/6/18  4:59 PM.  If you have any questions, ask your nurse or doctor.               These medicines have changed or have updated prescriptions.        Dose/Directions    diclofenac 1 % Gel topical gel   Commonly known as:  VOLTAREN   This may have changed:    - how much to take  - additional instructions   Used for:  Multiple joint pain         Dose:  2 g   Place 2 g onto the skin 4 times daily Leg and knee   Quantity:  1 Tube   Refills:  0                Primary Care Provider Office Phone # Fax #    Anh Pruitt Allyssa, ELANA [phone]       [address]  [facility] [address]        Equal Access to Services     OTTONIEL BURGESS : Hadii aad ku hadasho Soomaali, waaxda luqadaha, qaybta kaalmada adeegyada, waxay j carlosin haymtatyn donnell yumikojose ramon lanissa ah. So [facility] [phone].    ATENCIÓN: Si habla español, tiene a kaiser disposición servicios gratuitos de asistencia lingüística. [place] [phone].    We comply with applicable federal civil rights laws and [place] laws. We do not discriminate on the basis of race, color, national origin, age, disability, sex, sexual orientation, or gender identity.            Thank you!     Thank you for choosing [facility]  for your care. Our goal is always to provide you with excellent care. Hearing back from our patients is one way we can continue to improve our services. Please take a few minutes to complete the written survey that you may receive in the mail after your visit with us. Thank you!             Your Updated Medication List - Protect others around you: Learn how to safely use, store and throw away your medicines at www.disposemymeds.org.          This list is accurate as of 11/6/18  4:59 PM.  Always use your most recent med list.                   Brand Name Dispense Instructions for use Diagnosis    aspirin 325 MG tablet      Take 325 mg by mouth daily        blood glucose monitoring test strip    ACCU-CHEK SMARTVIEW    200 strip    USE TO TEST TWICE DAILY    Type 2 diabetes mellitus with chronic kidney disease, with long-term current use of insulin, unspecified CKD stage (H)       carvedilol 3.125 MG tablet    COREG    60 tablet    Take 1 tablet (3.125 mg) by mouth 2 times daily (with meals)    Essential hypertension       diclofenac 1 % Gel topical gel    VOLTAREN    1  Tube    Place 2 g onto the skin 4 times daily Leg and knee    Multiple joint pain       ferrous gluconate 324 (38 Fe) MG tablet    FERGON     Take 1 tablet by mouth daily.        finasteride 5 MG tablet    PROSCAR     Take 5 mg by mouth daily.        fluticasone 50 MCG/ACT spray    FLONASE     Spray 1 spray into both nostrils 2 times daily        * gabapentin 100 MG capsule    NEURONTIN    60 capsule    Take 2 capsules (200 mg) by mouth At Bedtime    Neuropathy       * gabapentin 100 MG capsule    NEURONTIN    60 capsule    Take 1 capsule (100 mg) by mouth 2 times daily    Neuropathy       loratadine 10 MG tablet    CLARITIN     Take 10 mg by mouth At Bedtime        metFORMIN 1000 MG tablet    GLUCOPHAGE    180 tablet    TAKE 1 TABLET (1,000 MG) BY MOUTH 2 TIMES DAILY (WITH MEALS) **DUE FOR APRIL APPT/LABS. CALL MD**    Type 2 diabetes mellitus without complication, without long-term current use of insulin (H)       omeprazole 20 MG CR capsule    priLOSEC    30 capsule    Take 1 capsule (20 mg) by mouth every morning (before breakfast)    Acute superficial gastritis without hemorrhage       OXYCODONE HCL PO      Take 5 mg by mouth every 6 hours as needed        tamsulosin 0.4 MG capsule    FLOMAX     Take 1 capsule by mouth daily.        TYLENOL PO      Take 650 mg by mouth every 6 hours as needed for mild pain or fever        * Notice:  This list has 2 medication(s) that are the same as other medications prescribed for you. Read the directions carefully, and ask your doctor or other care provider to review them with you.

## 2018-11-06 NOTE — PATIENT INSTRUCTIONS
Thank you for choosing [facility]!    DR. RANDHAWA'S CLINIC SCHEDULE  MONDAY AM - [place] TUESDAY - [place]   [address] [address] [place] [address] [place] [address]   [phone]       WEDNESDAY - [place] FRIDAY AM - WOUND CENTER   [address]   [place] [address] [place] [address]   [phone]       FRIDAY PM - [place] SCHEDULE SURGERY: [phone]   [address] BILLING QUESTIONS: [phone]   [place] [address] AFTER HOURS: [phone]   [phone] / [phone] APPOINTMENTS: [phone]     Consumer Price Line (CPL) [phone]       FOOT ULCER (WOUND) EDUCATION  Ulceration ofthe foot involves a break or hole in the skin. Skin is our best protection against infection. Skin is quite durable, however, the underlying tissues are fragile. For this reason, the wound is likely to deepen rapidly. Deep wounds usually get infected and require amputation. Prompt healing is therefore essential to avoid limb loss.     Foot ulcers do not heal without intervention. Walking on the foot and living your normal life is not typically compatible with healing the sore. Successful healing will require several months of significant alteration of your daily activities.   Ulcer complications frequently develop. This primarily includes infection of skin, which then spreads deep into your joints, bones and tendons. Spreading infection may travel up your leg and into other parts of your body. Deep infection is usually treated with amputation ofpart ofyour foot or your leg. Signs of infection include fever, chills, nausea, vomiting, erratic blood sugars, local redness, pus, strong odor and localized warmth. Signs of infection should be taken seriously. Prompt evaluation in the clinic or hospital emergency room is required.   Ulcer treatment  requires debridement or surgical removal of devitalized tissue. Your doctor will trim away callused, moistened, unhealthy tissue from the wound surface and margin. This helps to clean the wound and allows proper inspection. Debridement also stimulates healing even though the wound originally appears larger. Expect some bleeding with each debridement. You will be given instruction regarding wound bandaging. This often includes ointment and gauze. Avoid tape directly on the skin. Hand washing is essential since most infections will come from your fingertips. Ulcer care requires a no touch technique. Your fingers should not touch the margin or base of the wound.    HELPFUL HEALING TIPS:  1. Debridement: Getting rid of bad tissue makes way for good tissue to promote healing  2. Addressing Foot Deformities: Hammertoes and bunions can cause increased pressure  3. Pressure Reduction: If pressure remains to the wound, it won't heal  4. Good Pulses: If bloodflow is not getting to the foot, the ulcer will not heal  5. Good Nutrition: If you are not getting proper nutrition your body can't heal.Protein!  6. Infection Control: Keep the ulcer clean with wound cleanser. DO NOT SOAK IT!  7. Moisture Control: Keep edema down and make sure that drainage is getting pulled away from the ulcer    IMPORTANCE OF DEBRIDEMENT    Reduces bioburden to help control or reduce infection. Even if an ulcer is not  infected,  the bacterial bioburden causes increased local inflammation.    Allows more accurate visualization of the wound base and edges, which allows for more precise staging.    Removes necrotic/non-viable tissue, which impedes wound healing, causes protein loss and can be a nidus for infection.    Stimulates new circulation (angiogenesis) and allows adequate oxygen delivery to the wound.    Removes undermining and tunneling, and may help reduce abscess formation.    Releases healing growth factors at the edge of the wound.     Prepares the wound bed by leaving only tissues that are capable of regenerating.      One month follow up      Body Mass Index (BMI)  Many things can cause foot and ankle problems. Foot structure, activity level, foot mechanics and injuries are common causes of pain.  One very important issue that often goes unmentioned, is body weight. Extra weight can cause increased stress on muscles, ligaments, bones and tendons.  Sometimes just a few extra pounds is all it takes to put one over her/his threshold. Without reducing that stress, it can be difficult to alleviate pain. Some people are uncomfortable addressing this issue, but we feel it is important for you to think about it. As Foot &  Ankle specialists, our job is addressing the lower extremity problem and possible causes. Regarding extra body weight, we encourage patients to discuss diet and weight management plans with their primary care doctors. It is this team approach that gives you the best opportunity for pain relief and getting you back on your feet.

## 2018-11-07 NOTE — PROGRESS NOTES
PATIENT HISTORY:   Lance Ibrahim is a 73 year old male who presents to clinic for follow up from inpatient consult and surgery for left 3rd toe amputation. This was done a month ago. Also notes new ulceration to the left ankle. Started about 2 weeks ago. Rubbed in his cast boot he was given after surgery. No pain to left foot. Notes he had a gout attack to both knees and can hardly walk due to that. Denies fever, chills, nausa.     Review of Systems:  Patient denies fever, chills, rash, wound, stiffness, limping,  weakness, heart burn, blood in stool, chest pain with activity, calf pain when walking, shortness of breath with activity, chronic cough, easy bleeding/bruising, excessive thirst, fatigue, depression, anxiety.  Patient admits to swelling, numbness.     PAST MEDICAL HISTORY:   Past Medical History:   Diagnosis Date     Chronic rhinitis      Diabetes mellitus (H)      HTN (hypertension)      Hypertrophy of prostate with urinary obstruction and other lower urinary tract symptoms (LUTS)      TIA (transient ischaemic attack) 1993     Unspecified transient cerebral ischemia 1993    No definite source found        PAST SURGICAL HISTORY:   Past Surgical History:   Procedure Laterality Date     AMPUTATE TOE(S) Left 10/15/2018    Procedure: AMPUTATE TOE(S);  Left third toe amputation;  Surgeon: Ayaka Azevedo DPM, Podiatry/Foot and Ankle Surgery;  Location: [facility] OR     C NONSPECIFIC PROCEDURE  1985    Diastasis recti repair     C NONSPECIFIC PROCEDURE  1987    Ventral hernia repair     C NONSPECIFIC PROCEDURE      ORIF of left shoulder     COLONOSCOPY  3/9/2013    Procedure: COLONOSCOPY;  COLONOSCOPY;  Surgeon: Chau Hogan MD;  Location:  GI     EYE SURGERY  03/13/2017    left eye     FOOT SURGERY  4/2013    cyst removal      HERNIA REPAIR  7/13/2004    ventral         MEDICATIONS:   Current Outpatient Prescriptions:      Acetaminophen (TYLENOL PO), Take 650 mg by mouth every 6 hours as needed for mild pain or  "fever, Disp: , Rfl:      aspirin 325 MG tablet, Take 325 mg by mouth daily , Disp: , Rfl:      blood glucose monitoring (ACCU-CHEK SMARTVIEW) test strip, USE TO TEST TWICE DAILY, Disp: 200 strip, Rfl: 1     carvedilol (COREG) 3.125 MG tablet, Take 1 tablet (3.125 mg) by mouth 2 times daily (with meals), Disp: 60 tablet, Rfl: 0     diclofenac (VOLTAREN) 1 % GEL topical gel, Place 2 g onto the skin 4 times daily Leg and knee (Patient taking differently: Place 4 g onto the skin 4 times daily Leg and knee), Disp: 1 Tube, Rfl: 0     ferrous gluconate (FERGON) 324 (38 FE) MG tablet, Take 1 tablet by mouth daily., Disp: , Rfl:      finasteride (PROSCAR) 5 MG tablet, Take 5 mg by mouth daily., Disp: , Rfl:      fluticasone (FLONASE) 50 MCG/ACT spray, Spray 1 spray into both nostrils 2 times daily, Disp: , Rfl:      gabapentin (NEURONTIN) 100 MG capsule, Take 1 capsule (100 mg) by mouth 2 times daily, Disp: 60 capsule, Rfl: 0     gabapentin (NEURONTIN) 100 MG capsule, Take 2 capsules (200 mg) by mouth At Bedtime, Disp: 60 capsule, Rfl: 0     loratadine (CLARITIN) 10 MG tablet, Take 10 mg by mouth At Bedtime, Disp: , Rfl:      metFORMIN (GLUCOPHAGE) 1000 MG tablet, TAKE 1 TABLET (1,000 MG) BY MOUTH 2 TIMES DAILY (WITH MEALS) **DUE FOR APRIL APPT/LABS. CALL MD**, Disp: 180 tablet, Rfl: 1     omeprazole (PRILOSEC) 20 MG CR capsule, Take 1 capsule (20 mg) by mouth every morning (before breakfast), Disp: 30 capsule, Rfl: 0     OXYCODONE HCL PO, Take 5 mg by mouth every 6 hours as needed , Disp: , Rfl:      tamsulosin (FLOMAX) 0.4 MG 24 hr capsule, Take 1 capsule by mouth daily., Disp: , Rfl:      Wound Dressings ([facility] MANUKA HONEY WOUND) GEL, Externally apply 1 mL topically daily, Disp: 1 Tube, Rfl: 2     ALLERGIES:    Allergies   Allergen Reactions     Penicillins      \"anaphylactic\"     Sulfa Drugs      \"itchy rash, swelling of face and hands\"     Ancef [Cefazolin] Rash        SOCIAL HISTORY:   Social History     Social History "     Marital status:      Spouse name: N/A     Number of children: N/A     Years of education: N/A     Occupational History      Self     Social History Main Topics     Smoking status: Former Smoker     Quit date: 3/17/1993     Smokeless tobacco: Never Used     Alcohol use Yes      Comment: Occ, Once a month     Drug use: No     Sexual activity: No     Other Topics Concern     Not on file     Social History Narrative        FAMILY HISTORY:   Family History   Problem Relation Age of Onset     Family History Negative Mother       86 yo     Cancer Father       74 yo brain     Diabetes Maternal Grandfather       91 yo     Alcohol/Drug Paternal Grandfather              EXAM:Vitals: /68  Ht 6' (1.829 m)  Wt 250 lb (113.4 kg)  BMI 33.91 kg/m2  BMI= Body mass index is 33.91 kg/(m^2).    General appearance: Patient is alert and fully cooperative with history & exam.  No sign of distress is noted during the visit.     Psychiatric: Affect is pleasant & appropriate.  Patient appears motivated to improve health.     Respiratory: Breathing is regular & unlabored while sitting.     HEENT: Hearing is intact to spoken word.  Speech is clear.  No gross evidence of visual impairment that would impact ambulation.     Dermatologic: sutures intact. Skin is well approximated. New ulceration to the medial left ankle. Measures 0.6cm x 0.6cm x 0.1cm.      Vascular: DP & PT pulses are intact & regular bilaterally.   edema and varicosities noted.  CFT and skin temperature is normal to both lower extremities.     Neurologic: Lower extremity sensation is diminished to both feet.      Musculoskeletal: Patient is ambulatory without assistive device or brace. Amputation of left 3rd toe.     A1C: 6.4 (10/2018)     ASSESSMENT:    Diabetic polyneuropathy associated with type 2 diabetes mellitus (H)  Post-operative state  Ulcer of left ankle, with fat layer exposed (H)  Bilateral edema of lower  extremity  Pes planus of both feet  S/P amputation of lesser toe, left (H)     PLAN:  [facility] in [facility]. Sutures were removed to left foot. Recommend wound gel daily to the left ankle ulcer. He can shower and get the foot wet. Can go back to regular shoes and brace. Recommend follow up in 1 month for reassessment of ulcer.        Ayaka Azevedo DPM, Podiatry/Foot and Ankle Surgery    Weight management plan: Patient was referred to their PCP to discuss a diet and exercise plan.

## 2018-11-08 ENCOUNTER — OFFICE VISIT (OUTPATIENT)
Dept: INTERNAL MEDICINE | Facility: CLINIC | Age: 73
End: 2018-11-08
Payer: COMMERCIAL

## 2018-11-08 VITALS
HEIGHT: 72 IN | DIASTOLIC BLOOD PRESSURE: 58 MMHG | HEART RATE: 80 BPM | BODY MASS INDEX: 33.61 KG/M2 | WEIGHT: 248.1 LBS | TEMPERATURE: 97.8 F | SYSTOLIC BLOOD PRESSURE: 122 MMHG | RESPIRATION RATE: 17 BRPM

## 2018-11-08 DIAGNOSIS — E11.51 TYPE 2 DIABETES MELLITUS WITH PERIPHERAL VASCULAR DISEASE (H): ICD-10-CM

## 2018-11-08 DIAGNOSIS — S98.132A AMPUTATED TOE, LEFT (H): Primary | ICD-10-CM

## 2018-11-08 PROCEDURE — 99213 OFFICE O/P EST LOW 20 MIN: CPT | Performed by: NURSE PRACTITIONER

## 2018-11-08 RX ORDER — LISINOPRIL 10 MG/1
TABLET ORAL
Refills: 3 | Status: ON HOLD | COMMUNITY
Start: 2018-09-01 | End: 2019-02-18

## 2018-11-08 NOTE — PROGRESS NOTES
SUBJECTIVE:   Lance Ibrahim is a 73 year old male who presents to clinic today for the following health issues:          Hospital Follow-up Visit:    Hospital/Nursing Home/ Rehab Facility: [facility]   Date of Admission: 10/14/18   Date of Discharge: 11/3/18  Reason(s) for Admission: sepsis             Problems taking medications regularly:  None       Medication changes since discharge: None       Problems adhering to non-medication therapy:  None    Summary of hospitalization:  [facility] discharge summary reviewed  Diagnostic Tests/Treatments reviewed.  Follow up needed: none  Other Healthcare Providers Involved in Patient s Care:         Specialist appointment - podiatry  Update since discharge: improved.     Post Discharge Medication Reconciliation: discharge medications reconciled, continue medications without change.  Plan of care communicated with patient     Coding guidelines for this visit:  Type of Medical   Decision Making Face-to-Face Visit       within 7 Days of discharge Face-to-Face Visit        within 14 days of discharge   Moderate Complexity [id number]   High Complexity 45868 [id number]                  Problem list and histories reviewed & adjusted, as indicated.  Additional history: as documented    Patient Active Problem List   Diagnosis     Ventral hernia     Chronic rhinitis     Hypertrophy of prostate with urinary obstruction     Transient cerebral ischemia     HTN (hypertension)     CARDIOVASCULAR SCREENING; LDL GOAL LESS THAN 100     Gout     Type 2 diabetes, HbA1c goal < 7% (H)     Obesity     Diabetes mellitus type 2, uncomplicated (H)     Benign essential hypertension     Cervicalgia     Sepsis (H)     Cellulitis     Diabetic foot infection (H)     Past Surgical History:   Procedure Laterality Date     AMPUTATE TOE(S) Left 10/15/2018    Procedure: AMPUTATE TOE(S);  Left third toe amputation;  Surgeon: Ayaka Azevedo DPM,  Podiatry/Foot and Ankle Surgery;  Location: [facility] OR     C NONSPECIFIC PROCEDURE      Diastasis recti repair     C NONSPECIFIC PROCEDURE      Ventral hernia repair     C NONSPECIFIC PROCEDURE      ORIF of left shoulder     COLONOSCOPY  3/9/2013    Procedure: COLONOSCOPY;  COLONOSCOPY;  Surgeon: Chau Hogan MD;  Location:  GI     EYE SURGERY  2017    left eye     FOOT SURGERY  2013    cyst removal      HERNIA REPAIR  2004    ventral        Social History   Substance Use Topics     Smoking status: Former Smoker     Quit date: 3/17/1993     Smokeless tobacco: Never Used     Alcohol use Yes      Comment: Occ, Once a month     Family History   Problem Relation Age of Onset     Family History Negative Mother       88 yo     Cancer Father       76 yo brain     Diabetes Maternal Grandfather       89 yo     Alcohol/Drug Paternal Grandfather               Current Outpatient Prescriptions   Medication Sig Dispense Refill     Acetaminophen (TYLENOL PO) Take 650 mg by mouth every 6 hours as needed for mild pain or fever       aspirin 325 MG tablet Take 325 mg by mouth daily        blood glucose monitoring (ACCU-CHEK SMARTVIEW) test strip USE TO TEST TWICE DAILY 200 strip 1     ferrous gluconate (FERGON) 324 (38 FE) MG tablet Take 1 tablet by mouth daily.       finasteride (PROSCAR) 5 MG tablet Take 5 mg by mouth daily.       fluticasone (FLONASE) 50 MCG/ACT spray Spray 1 spray into both nostrils 2 times daily       lisinopril (PRINIVIL/ZESTRIL) 10 MG tablet TAKE 2 TABLETS (20 MG) BY MOUTH DAILY  3     metFORMIN (GLUCOPHAGE) 1000 MG tablet TAKE 1 TABLET (1,000 MG) BY MOUTH 2 TIMES DAILY (WITH MEALS) **DUE FOR APRIL APPT/LABS. CALL MD** 180 tablet 1     tamsulosin (FLOMAX) 0.4 MG 24 hr capsule Take 1 capsule by mouth daily.       Wound Dressings ([facility] MANUKA HONEY WOUND) GEL Externally apply 1 mL topically daily 1 Tube 2     carvedilol (COREG) 3.125 MG tablet Take 1 tablet  (3.125 mg) by mouth 2 times daily (with meals) (Patient not taking: Reported on 11/8/2018) 60 tablet 0     diclofenac (VOLTAREN) 1 % GEL topical gel Place 2 g onto the skin 4 times daily Leg and knee (Patient not taking: Reported on 11/8/2018) 1 Tube 0     gabapentin (NEURONTIN) 100 MG capsule Take 1 capsule (100 mg) by mouth 2 times daily (Patient not taking: Reported on 11/8/2018) 60 capsule 0     gabapentin (NEURONTIN) 100 MG capsule Take 2 capsules (200 mg) by mouth At Bedtime (Patient not taking: Reported on 11/8/2018) 60 capsule 0     loratadine (CLARITIN) 10 MG tablet Take 10 mg by mouth At Bedtime       omeprazole (PRILOSEC) 20 MG CR capsule Take 1 capsule (20 mg) by mouth every morning (before breakfast) (Patient not taking: Reported on 11/8/2018) 30 capsule 0     OXYCODONE HCL PO Take 5 mg by mouth every 6 hours as needed        BP Readings from Last 3 Encounters:   11/08/18 122/58   11/06/18 128/68   11/02/18 145/83    Wt Readings from Last 3 Encounters:   11/08/18 248 lb 1.6 oz (112.5 kg)   11/06/18 250 lb (113.4 kg)   11/02/18 240 lb 9.6 oz (109.1 kg)                    Reviewed and updated as needed this visit by clinical staff  Tobacco  Allergies  Meds  Med Hx  Surg Hx  Fam Hx  Soc Hx      Reviewed and updated as needed this visit by Provider         ROS:  Constitutional, HEENT, cardiovascular, pulmonary, gi and gu systems are negative, except as otherwise noted.    OBJECTIVE:     /58 (BP Location: Right arm, Patient Position: Chair, Cuff Size: Adult Large)  Pulse 80  Temp 97.8  F (36.6  C) (Oral)  Resp 17  Ht 6' (1.829 m)  Wt 248 lb 1.6 oz (112.5 kg)  BMI 33.65 kg/m2  Body mass index is 33.65 kg/(m^2).  GENERAL: healthy, alert and no distress  MS: L 3rd toe amputation  Incision dry, intact.  L medial malleolus shallow 5 mm ulcer with white fat pad appearance         ASSESSMENT/PLAN:               ICD-10-CM    1. Amputated toe, left (H) Z89.422    2. Type 2 diabetes mellitus with  peripheral vascular disease (H) E11.51        Podiatry f/u 1 month  Diabetes f/u 3 months    Anh Spivey NP  [facility]

## 2018-11-08 NOTE — MR AVS SNAPSHOT
After Visit Summary   11/8/2018    Lance Ibrahim    MRN: [id number]           Patient Information     Date Of Birth          1945        Visit Information        Provider Department      11/8/2018 4:00 PM Anh Spivey NP [facility]        Today's Diagnoses     Amputated toe, left (H)    -  1    Type 2 diabetes mellitus with peripheral vascular disease (H)           Follow-ups after your visit        Who to contact     If you have questions or need follow up information about today's clinic visit or your schedule please contact [facility] directly at [phone].  Normal or non-critical lab and imaging results will be communicated to you by MyChart, letter or phone within 4 business days after the clinic has received the results. If you do not hear from us within 7 days, please contact the clinic through MyChart or phone. If you have a critical or abnormal lab result, we will notify you by phone as soon as possible.  Submit refill requests through [employer] or call your pharmacy and they will forward the refill request to us. Please allow 3 business days for your refill to be completed.          Additional Information About Your Visit        Care EveryWhere ID     This is your Care EveryWhere ID. This could be used by other organizations to access your [place] medical records  [id number]        Your Vitals Were     Pulse Temperature Respirations Height BMI (Body Mass Index)       80 97.8  F (36.6  C) (Oral) 17 6' (1.829 m) 33.65 kg/m2        Blood Pressure from Last 3 Encounters:   11/08/18 122/58   11/06/18 128/68   11/02/18 145/83    Weight from Last 3 Encounters:   11/08/18 248 lb 1.6 oz (112.5 kg)   11/06/18 250 lb (113.4 kg)   11/02/18 240 lb 9.6 oz (109.1 kg)              Today, you had the following     No orders found for display       Primary Care Provider Office Phone # Fax #    Anh Spivey [phone] [facility] [address]        Equal Access to Services     OTTONIEL BURGESS : Hadii aad ku hadbillystephany Karenivan, wamarlonda luqnasimaha, qaybta kajosephterrie eaton, ashley elilexusjose ramon bran. So [facility] [phone].    ATENCIÓN: Si habla español, tiene a kaiser disposición servicios gratuitos de asistencia lingüística. Llame al [phone].    We comply with applicable federal civil rights laws and [place] laws. We do not discriminate on the basis of race, color, national origin, age, disability, sex, sexual orientation, or gender identity.            Thank you!     Thank you for choosing [facility]  for your care. Our goal is always to provide you with excellent care. Hearing back from our patients is one way we can continue to improve our services. Please take a few minutes to complete the written survey that you may receive in the mail after your visit with us. Thank you!             Your Updated Medication List - Protect others around you: Learn how to safely use, store and throw away your medicines at www.disposemymeds.org.          This list is accurate as of 11/8/18  4:51 PM.  Always use your most recent med list.                   Brand Name Dispense Instructions for use Diagnosis    aspirin 325 MG tablet      Take 325 mg by mouth daily        blood glucose monitoring test strip    ACCU-CHEK SMARTVIEW    200 strip    USE TO TEST TWICE DAILY    Type 2 diabetes mellitus with chronic kidney disease, with long-term current use of insulin, unspecified CKD stage (H)       carvedilol 3.125 MG tablet    COREG    60 tablet    Take 1 tablet (3.125 mg) by mouth 2 times daily (with meals)    Essential hypertension       [facility] MANUKA HONEY WOUND Gel     1 Tube    Externally apply 1 mL topically daily    Diabetic polyneuropathy associated with type 2 diabetes mellitus (H), Post-operative state, Ulcer of left ankle, with fat layer exposed (H), Bilateral edema of lower extremity, Pes planus of both  feet, S/P amputation of lesser toe, left (H)       diclofenac 1 % Gel topical gel    VOLTAREN    1 Tube    Place 2 g onto the skin 4 times daily Leg and knee    Multiple joint pain       ferrous gluconate 324 (38 Fe) MG tablet    FERGON     Take 1 tablet by mouth daily.        finasteride 5 MG tablet    PROSCAR     Take 5 mg by mouth daily.        fluticasone 50 MCG/ACT spray    FLONASE     Spray 1 spray into both nostrils 2 times daily        * gabapentin 100 MG capsule    NEURONTIN    60 capsule    Take 2 capsules (200 mg) by mouth At Bedtime    Neuropathy       * gabapentin 100 MG capsule    NEURONTIN    60 capsule    Take 1 capsule (100 mg) by mouth 2 times daily    Neuropathy       lisinopril 10 MG tablet    PRINIVIL/ZESTRIL     TAKE 2 TABLETS (20 MG) BY MOUTH DAILY        loratadine 10 MG tablet    CLARITIN     Take 10 mg by mouth At Bedtime        metFORMIN 1000 MG tablet    GLUCOPHAGE    180 tablet    TAKE 1 TABLET (1,000 MG) BY MOUTH 2 TIMES DAILY (WITH MEALS) **DUE FOR APRIL APPT/LABS. CALL MD**    Type 2 diabetes mellitus without complication, without long-term current use of insulin (H)       omeprazole 20 MG CR capsule    priLOSEC    30 capsule    Take 1 capsule (20 mg) by mouth every morning (before breakfast)    Acute superficial gastritis without hemorrhage       OXYCODONE HCL PO      Take 5 mg by mouth every 6 hours as needed        tamsulosin 0.4 MG capsule    FLOMAX     Take 1 capsule by mouth daily.        TYLENOL PO      Take 650 mg by mouth every 6 hours as needed for mild pain or fever        * Notice:  This list has 2 medication(s) that are the same as other medications prescribed for you. Read the directions carefully, and ask your doctor or other care provider to review them with you.

## 2018-11-12 ENCOUNTER — TRANSFERRED RECORDS (OUTPATIENT)
Dept: HEALTH INFORMATION MANAGEMENT | Facility: CLINIC | Age: 73
End: 2018-11-12

## 2018-11-12 DIAGNOSIS — J31.0 CHRONIC RHINITIS: ICD-10-CM

## 2018-11-13 RX ORDER — FLUTICASONE PROPIONATE 50 MCG
SPRAY, SUSPENSION (ML) NASAL
Qty: 48 ML | Refills: 1 | Status: SHIPPED | OUTPATIENT
Start: 2018-11-13 | End: 2018-11-16

## 2018-11-13 NOTE — TELEPHONE ENCOUNTER
"Requested Prescriptions   Pending Prescriptions Disp Refills     fluticasone (FLONASE) 50 MCG/ACT spray [Pharmacy Med Name: FLUTICASONE PROP 50 MCG SPRAY]  Last Written Prescription Date:  historical  Last Fill Quantity: ,  # refills:    Last office visit: 11/8/2018 with prescribing provider:     Future Office Visit:   48 mL 1     Sig: USE 1-2 SPRAYS INTO BOTH NOSTRILS DAILY    Inhaled Steroids Protocol Passed    11/12/2018  9:00 AM       Passed - Patient is age 12 or older       Passed - Recent (12 mo) or future (30 days) visit within the authorizing provider's specialty    Patient had office visit in the last 12 months or has a visit in the next 30 days with authorizing provider or within the authorizing provider's specialty.  See \"Patient Info\" tab in inbasket, or \"Choose Columns\" in Meds & Orders section of the refill encounter.              "

## 2018-11-14 ENCOUNTER — MEDICAL CORRESPONDENCE (OUTPATIENT)
Dept: HEALTH INFORMATION MANAGEMENT | Facility: CLINIC | Age: 73
End: 2018-11-14

## 2018-11-15 DIAGNOSIS — E11.9 TYPE 2 DIABETES MELLITUS (H): ICD-10-CM

## 2018-11-15 NOTE — TELEPHONE ENCOUNTER
"Requested Prescriptions   Pending Prescriptions Disp Refills     blood glucose monitoring (ACCU-CHEK FASTCLIX) lancets [Pharmacy Med Name: ACCU-CHEK FASTCLIX LANCETS]  Last Written Prescription Date: historical  Last Fill Quantity: ,  # refills:    Last office visit: 2018 with prescribing provider:     Future Office Visit:   Next 5 appointments (look out 90 days)     2018  8:20 AM CST   Pre-[facility] physical with Anh [facility]y NP   [facility]    [address]  [facility] [address]   [phone]                204 each 1     Si EACH BY IN VITRO ROUTE 2 TIMES DAILY    Diabetic Supplies Protocol Passed    11/15/2018  5:08 PM       Passed - Patient is 18 years of age or older       Passed - Recent (6 mo) or future (30 days) visit within the authorizing provider's specialty    Patient had office visit in the last 6 months or has a visit in the next 30 days with authorizing provider.  See \"Patient Info\" tab in inbasket, or \"Choose Columns\" in Meds & Orders section of the refill encounter.            "

## 2018-11-16 RX ORDER — LANCETS
EACH MISCELLANEOUS
Qty: 204 EACH | Refills: 1 | Status: SHIPPED | OUTPATIENT
Start: 2018-11-16 | End: 2019-02-17

## 2018-11-26 ENCOUNTER — OFFICE VISIT (OUTPATIENT)
Dept: INTERNAL MEDICINE | Facility: CLINIC | Age: 73
End: 2018-11-26
Payer: COMMERCIAL

## 2018-11-26 VITALS
RESPIRATION RATE: 16 BRPM | WEIGHT: 260.3 LBS | BODY MASS INDEX: 35.26 KG/M2 | TEMPERATURE: 98.4 F | HEART RATE: 92 BPM | SYSTOLIC BLOOD PRESSURE: 142 MMHG | DIASTOLIC BLOOD PRESSURE: 78 MMHG | HEIGHT: 72 IN | OXYGEN SATURATION: 96 %

## 2018-11-26 DIAGNOSIS — Z01.818 PREOP GENERAL PHYSICAL EXAM: ICD-10-CM

## 2018-11-26 DIAGNOSIS — Z01.818 PRE-OP EXAM: Primary | ICD-10-CM

## 2018-11-26 LAB
ALBUMIN UR-MCNC: ABNORMAL MG/DL
APPEARANCE UR: CLEAR
BILIRUB UR QL STRIP: NEGATIVE
COLOR UR AUTO: YELLOW
ERYTHROCYTE [DISTWIDTH] IN BLOOD BY AUTOMATED COUNT: 18.6 % (ref 10–15)
GLUCOSE UR STRIP-MCNC: NEGATIVE MG/DL
HCT VFR BLD AUTO: 35.6 % (ref 40–53)
HGB BLD-MCNC: 10.8 G/DL (ref 13.3–17.7)
HGB UR QL STRIP: NEGATIVE
KETONES UR STRIP-MCNC: ABNORMAL MG/DL
LEUKOCYTE ESTERASE UR QL STRIP: NEGATIVE
MCH RBC QN AUTO: 28.7 PG (ref 26.5–33)
MCHC RBC AUTO-ENTMCNC: 30.3 G/DL (ref 31.5–36.5)
MCV RBC AUTO: 95 FL (ref 78–100)
MRSA DNA SPEC QL NAA+PROBE: NEGATIVE
NITRATE UR QL: NEGATIVE
PH UR STRIP: 5 PH (ref 5–7)
PLATELET # BLD AUTO: 255 10E9/L (ref 150–450)
RBC # BLD AUTO: 3.76 10E12/L (ref 4.4–5.9)
RBC #/AREA URNS AUTO: NORMAL /HPF
SOURCE: ABNORMAL
SP GR UR STRIP: >1.03 (ref 1–1.03)
SPECIMEN SOURCE: NORMAL
UROBILINOGEN UR STRIP-ACNC: 0.2 EU/DL (ref 0.2–1)
WBC # BLD AUTO: 8.3 10E9/L (ref 4–11)
WBC #/AREA URNS AUTO: NORMAL /HPF

## 2018-11-26 PROCEDURE — 93000 ELECTROCARDIOGRAM COMPLETE: CPT | Performed by: NURSE PRACTITIONER

## 2018-11-26 PROCEDURE — 81001 URINALYSIS AUTO W/SCOPE: CPT | Performed by: NURSE PRACTITIONER

## 2018-11-26 PROCEDURE — 87640 STAPH A DNA AMP PROBE: CPT | Mod: 59 | Performed by: NURSE PRACTITIONER

## 2018-11-26 PROCEDURE — 85027 COMPLETE CBC AUTOMATED: CPT | Performed by: NURSE PRACTITIONER

## 2018-11-26 PROCEDURE — 80048 BASIC METABOLIC PNL TOTAL CA: CPT | Performed by: NURSE PRACTITIONER

## 2018-11-26 PROCEDURE — 99214 OFFICE O/P EST MOD 30 MIN: CPT | Performed by: NURSE PRACTITIONER

## 2018-11-26 PROCEDURE — 36415 COLL VENOUS BLD VENIPUNCTURE: CPT | Performed by: NURSE PRACTITIONER

## 2018-11-26 PROCEDURE — 87641 MR-STAPH DNA AMP PROBE: CPT | Performed by: NURSE PRACTITIONER

## 2018-11-26 NOTE — MR AVS SNAPSHOT
After Visit Summary   11/26/2018    Lance Ibrahim    MRN: [id number]           Patient Information     Date Of Birth          1945        Visit Information        Provider Department      11/26/2018 8:20 AM Anh Spivey NP [facility]        Today's Diagnoses     Pre-op exam    -  1    Preop general physical exam          Care Instructions      Before Your Surgery      Call your surgeon if there is any change in your health. This includes signs of a cold or flu (such as a sore throat, runny nose, cough, rash or fever).    Do not smoke, drink alcohol or take over the counter medicine (unless your surgeon or primary care doctor tells you to) for the 24 hours before and after surgery.    If you take prescribed drugs: Follow your doctor s orders about which medicines to take and which to stop until after surgery.    Eating and drinking prior to surgery: follow the instructions from your surgeon    Take a shower or bath the night before surgery. Use the soap your surgeon gave you to gently clean your skin. If you do not have soap from your surgeon, use your regular soap. Do not shave or scrub the surgery site.  Wear clean pajamas and have clean sheets on your bed.     Before Your Surgery      Call your surgeon if there is any change in your health. This includes signs of a cold or flu (such as a sore throat, runny nose, cough, rash or fever).    Do not smoke, drink alcohol or take over the counter medicine (unless your surgeon or primary care doctor tells you to) for the 24 hours before and after surgery.    If you take prescribed drugs: Follow your doctor s orders about which medicines to take and which to stop until after surgery.    Eating and drinking prior to surgery: follow the instructions from your surgeon    Take a shower or bath the night before surgery. Use the soap your surgeon gave you to gently clean your skin. If you do not have soap from your surgeon, use your  regular soap. Do not shave or scrub the surgery site.  Wear clean pajamas and have clean sheets on your bed.     Before Your Surgery      Call your surgeon if there is any change in your health. This includes signs of a cold or flu (such as a sore throat, runny nose, cough, rash or fever).    Do not smoke, drink alcohol or take over the counter medicine (unless your surgeon or primary care doctor tells you to) for the 24 hours before and after surgery.    If you take prescribed drugs: Follow your doctor s orders about which medicines to take and which to stop until after surgery.    Eating and drinking prior to surgery: follow the instructions from your surgeon    Take a shower or bath the night before surgery. Use the soap your surgeon gave you to gently clean your skin. If you do not have soap from your surgeon, use your regular soap. Do not shave or scrub the surgery site.  Wear clean pajamas and have clean sheets on your bed.     Before Your Surgery      Call your surgeon if there is any change in your health. This includes signs of a cold or flu (such as a sore throat, runny nose, cough, rash or fever).    Do not smoke, drink alcohol or take over the counter medicine (unless your surgeon or primary care doctor tells you to) for the 24 hours before and after surgery.    If you take prescribed drugs: Follow your doctor s orders about which medicines to take and which to stop until after surgery.    Eating and drinking prior to surgery: follow the instructions from your surgeon    Take a shower or bath the night before surgery. Use the soap your surgeon gave you to gently clean your skin. If you do not have soap from your surgeon, use your regular soap. Do not shave or scrub the surgery site.  Wear clean pajamas and have clean sheets on your bed.     Before Your Surgery      Call your surgeon if there is any change in your health. This includes signs of a cold or flu (such as a sore throat, runny nose, cough, rash  or fever).    Do not smoke, drink alcohol or take over the counter medicine (unless your surgeon or primary care doctor tells you to) for the 24 hours before and after surgery.    If you take prescribed drugs: Follow your doctor s orders about which medicines to take and which to stop until after surgery.    Eating and drinking prior to surgery: follow the instructions from your surgeon    Take a shower or bath the night before surgery. Use the soap your surgeon gave you to gently clean your skin. If you do not have soap from your surgeon, use your regular soap. Do not shave or scrub the surgery site.  Wear clean pajamas and have clean sheets on your bed.     Before Your Surgery      Call your surgeon if there is any change in your health. This includes signs of a cold or flu (such as a sore throat, runny nose, cough, rash or fever).    Do not smoke, drink alcohol or take over the counter medicine (unless your surgeon or primary care doctor tells you to) for the 24 hours before and after surgery.    If you take prescribed drugs: Follow your doctor s orders about which medicines to take and which to stop until after surgery.    Eating and drinking prior to surgery: follow the instructions from your surgeon    Take a shower or bath the night before surgery. Use the soap your surgeon gave you to gently clean your skin. If you do not have soap from your surgeon, use your regular soap. Do not shave or scrub the surgery site.  Wear clean pajamas and have clean sheets on your bed.           Follow-ups after your visit        Your next 10 appointments already scheduled     Dec 07, 2018   Procedure with Issa Cunha MD   [facility] (--)    [address] [facility] [id number] [phone]              Who to contact     If you have questions or need follow up information about today's clinic visit or your schedule please contact [facility] directly at  [phone].  Normal or non-critical lab and imaging results will be communicated to you by MyChart, letter or phone within 4 business days after the clinic has received the results. If you do not hear from us within 7 days, please contact the clinic through MyChart or phone. If you have a critical or abnormal lab result, we will notify you by phone as soon as possible.  Submit refill requests through [employer] or call your pharmacy and they will forward the refill request to us. Please allow 3 business days for your refill to be completed.          Additional Information About Your Visit        Care EveryWhere ID     This is your Care EveryWhere ID. This could be used by other organizations to access your [facility] medical records  [id number]        Your Vitals Were     Pulse Temperature Respirations Height Pulse Oximetry BMI (Body Mass Index)    92 98.4  F (36.9  C) (Oral) 16 6' (1.829 m) 96% 35.3 kg/m2       Blood Pressure from Last 3 Encounters:   11/26/18 142/78   11/08/18 122/58   11/06/18 128/68    Weight from Last 3 Encounters:   11/26/18 260 lb 4.8 oz (118.1 kg)   11/08/18 248 lb 1.6 oz (112.5 kg)   11/06/18 250 lb (113.4 kg)              We Performed the Following     Basic metabolic panel     CBC with platelets     EKG 12-lead complete w/read - Clinics     Methicillin Resist/Sens S. aureus PCR     UA reflex to Microscopic and Culture     Urine Microscopic        Primary Care Provider Office Phone # Fax #    Anh Spivey [phone]       [address] [facility] [address]        Equal Access to Services     [facility]: Hadii aad ku hadasho Soomaali, waaxda luqadaha, qaybta kaalmada adeleayaterrie, ashley mayes . So [facility] [phone].    ATENCIÓN: Si habla español, tiene a kaiser disposición servicios gratuitos de asistencia lingüística. Llame al [phone].    We comply with applicable federal civil rights laws and [place] laws. We do not  discriminate on the basis of race, color, national origin, age, disability, sex, sexual orientation, or gender identity.            Thank you!     Thank you for choosing [facility]  for your care. Our goal is always to provide you with excellent care. Hearing back from our patients is one way we can continue to improve our services. Please take a few minutes to complete the written survey that you may receive in the mail after your visit with us. Thank you!             Your Updated Medication List - Protect others around you: Learn how to safely use, store and throw away your medicines at www.disposemymeds.org.          This list is accurate as of 11/26/18 11:14 AM.  Always use your most recent med list.                   Brand Name Dispense Instructions for use Diagnosis    aspirin 325 MG tablet    ASA     Take 325 mg by mouth daily        blood glucose monitoring lancets     204 each    1 EACH BY IN VITRO ROUTE 2 TIMES DAILY    Type 2 diabetes mellitus (H)       blood glucose monitoring test strip    ACCU-CHEK SMARTVIEW    200 strip    USE TO TEST TWICE DAILY    Type 2 diabetes mellitus with chronic kidney disease, with long-term current use of insulin, unspecified CKD stage (H)       [employer] MANU HONEY WOUND Gel     1 Tube    Externally apply 1 mL topically daily    Diabetic polyneuropathy associated with type 2 diabetes mellitus (H), Post-operative state, Ulcer of left ankle, with fat layer exposed (H), Bilateral edema of lower extremity, Pes planus of both feet, S/P amputation of lesser toe, left (H)       ferrous gluconate 324 (38 Fe) MG tablet    FERGON     Take 1 tablet by mouth daily.        finasteride 5 MG tablet    PROSCAR     Take 5 mg by mouth daily.        fluticasone 50 MCG/ACT spray    FLONASE     Spray 1 spray into both nostrils 2 times daily        lisinopril 10 MG tablet    PRINIVIL/ZESTRIL     TAKE 2 TABLETS (20 MG) BY MOUTH DAILY        loratadine 10 MG tablet    CLARITIN      Take 10 mg by mouth At Bedtime        metFORMIN 1000 MG tablet    GLUCOPHAGE    180 tablet    TAKE 1 TABLET (1,000 MG) BY MOUTH 2 TIMES DAILY (WITH MEALS) **DUE FOR APRIL APPT/LABS. CALL MD**    Type 2 diabetes mellitus without complication, without long-term current use of insulin (H)       MULTIVITAMIN ADULTS PO      Take by mouth daily        tamsulosin 0.4 MG capsule    FLOMAX     Take 1 capsule by mouth daily.        TYLENOL PO      Take 650 mg by mouth every 6 hours as needed for mild pain or fever

## 2018-11-26 NOTE — PROGRESS NOTES
[place] [address]  [facility] [address]  [phone]  Dept: [phone]    PRE-OP EVALUATION:  Today's date: 2018    Lance Ibrahim (: 1945) presents for pre-operative evaluation assessment as requested by Dr. Issa Cunha  He requires evaluation and anesthesia risk assessment prior to undergoing surgery/procedure for treatment of degenerative joint right knee .    Fax number for surgical facility: [facility]  Primary Physician: Anh Spivey  Type of Anesthesia Anticipated: General    Patient has a Health Care Directive or Living Will:  NO    No flowsheet data found.    HPI:     HPI related to upcoming procedure: R knee DJD      See problem list for active medical problems.  Problems all longstanding and stable, except as noted/documented.  See ROS for pertinent symptoms related to these conditions.                                                                                                                                                          .    MEDICAL HISTORY:     Patient Active Problem List    Diagnosis Date Noted     Type 2 diabetes, HbA1c goal < 7% (H) 2013     Priority: High     HTN (hypertension) 2008     Priority: High     Transient cerebral ischemia 2004     Priority: High     Problem list name updated by automated process. Provider to review       Amputated toe, left (H) 2018     Priority: Medium     Type 2 diabetes mellitus with peripheral vascular disease (H) 2018     Priority: Medium     Diabetic foot infection (H) 10/14/2018     Priority: Medium     Cellulitis 2018     Priority: Medium     Sepsis (H) 2018     Priority: Medium     Cervicalgia 2017     Priority: Medium     Benign essential hypertension 2016     Priority: Medium     Obesity 10/14/2015     Priority: Medium     Diabetes mellitus type 2, uncomplicated (H) 10/14/2015     Priority: Medium     CARDIOVASCULAR  SCREENING; LDL GOAL LESS THAN 100 10/31/2010     Priority: Medium     Hypertrophy of prostate with urinary obstruction 07/09/2004     Priority: Medium     Problem list name updated by automated process. Provider to review       Ventral hernia 06/28/2004     Priority: Medium     Problem list name updated by automated process. Provider to review       Gout 07/20/2012     Priority: Low     Chronic rhinitis 07/09/2004     Priority: Low      Past Medical History:   Diagnosis Date     Arthritis     osteoarthritis knees     Cerebral artery occlusion with cerebral infarction (H)     TIA 1993, no residual     Chronic rhinitis      Diabetes mellitus (H)      HTN (hypertension)      Hypertrophy of prostate with urinary obstruction and other lower urinary tract symptoms (LUTS)      Sleep apnea     doesn't use cpap     TIA (transient ischaemic attack) 1993     Unspecified transient cerebral ischemia 1993    No definite source found     Past Surgical History:   Procedure Laterality Date     AMPUTATE TOE(S) Left 10/15/2018    Procedure: AMPUTATE TOE(S);  Left third toe amputation;  Surgeon: Ayaka Azevedo DPM, Podiatry/Foot and Ankle Surgery;  Location: [facility] OR     C NONSPECIFIC PROCEDURE  1985    Diastasis recti repair     C NONSPECIFIC PROCEDURE  1987    Ventral hernia repair     C NONSPECIFIC PROCEDURE      ORIF of left shoulder     COLONOSCOPY  3/9/2013    Procedure: COLONOSCOPY;  COLONOSCOPY;  Surgeon: Chau Hogan MD;  Location:  GI     EYE SURGERY  03/13/2017    left eye cataract     FOOT SURGERY  4/2013    cyst removal      HERNIA REPAIR  7/13/2004    ventral      Current Outpatient Prescriptions   Medication Sig Dispense Refill     Acetaminophen (TYLENOL PO) Take 650 mg by mouth every 6 hours as needed for mild pain or fever       aspirin 325 MG tablet Take 325 mg by mouth daily        blood glucose monitoring (ACCU-CHEK FASTCLIX) lancets 1 EACH BY IN VITRO ROUTE 2 TIMES DAILY 204 each 1     blood glucose monitoring  "(ACCU-CHEK SMARTVIEW) test strip USE TO TEST TWICE DAILY 200 strip 1     ferrous gluconate (FERGON) 324 (38 FE) MG tablet Take 1 tablet by mouth daily.       finasteride (PROSCAR) 5 MG tablet Take 5 mg by mouth daily.       fluticasone (FLONASE) 50 MCG/ACT spray Spray 1 spray into both nostrils 2 times daily       lisinopril (PRINIVIL/ZESTRIL) 10 MG tablet TAKE 2 TABLETS (20 MG) BY MOUTH DAILY  3     loratadine (CLARITIN) 10 MG tablet Take 10 mg by mouth At Bedtime       metFORMIN (GLUCOPHAGE) 1000 MG tablet TAKE 1 TABLET (1,000 MG) BY MOUTH 2 TIMES DAILY (WITH MEALS) **DUE FOR APRIL APPT/LABS. CALL MD** 180 tablet 1     Multiple Vitamins-Minerals (MULTIVITAMIN ADULTS PO) Take by mouth daily       tamsulosin (FLOMAX) 0.4 MG 24 hr capsule Take 1 capsule by mouth daily.       Wound Dressings ([facility] MANUKA HONEY WOUND) GEL Externally apply 1 mL topically daily 1 Tube 2     OTC products: {OTC ANALGESICS:483061}    Allergies   Allergen Reactions     Penicillins Anaphylaxis     \"anaphylactic\"     Sulfa Drugs      \"itchy rash, swelling of face and hands\"     Ancef [Cefazolin] Rash      Latex Allergy: {YES/NO WITH DEFAULT:922066::\"NO\"}    Social History   Substance Use Topics     Smoking status: Former Smoker     Quit date: 3/17/1993     Smokeless tobacco: Never Used     Alcohol use Yes      Comment: Occ, Once a month     History   Drug Use No       REVIEW OF SYSTEMS:   {ROS Preop Choices:194751}    EXAM:   /78 (BP Location: Right arm, Patient Position: Sitting, Cuff Size: Adult Large)  Pulse 92  Temp 98.4  F (36.9  C) (Oral)  Resp 16  Ht 6' (1.829 m)  Wt 260 lb 4.8 oz (118.1 kg)  SpO2 96%  BMI 35.3 kg/m2  {EXAM Preop Choices:476386}    DIAGNOSTICS:   {DIAGNOSTIC FOR PREOP:158340}    Recent Labs   Lab Test  11/05/18   0944 10/30/18  10/22/18   0628   10/17/18   0709   05/01/18   1951   12/24/12   0804   HGB   --   11.0*  11.1*   < >   --    < >   --    < >  13.2*   PLT   --   535*  488*   < >   --    < >  193   < " ">  227   INR   --    --    --    --    --    --    --    --   1.05   NA  139  137  133   < >   --    < >   --    < >   --    POTASSIUM  4.7  5.2*  4.9   < >   --    < >   --    < >   --    CR  1.39*  1.08  1.01   < >  1.15   < >  1.06   < >   --    A1C   --    --    --    --   6.4*   --   6.7*   < >   --     < > = values in this interval not displayed.        IMPRESSION:   {PREOP REASONS:224401::\"Reason for surgery/procedure: ***\",\"Diagnosis/reason for consult: ***\"}    The proposed surgical procedure is considered {HIGH=major cardiovascular or procedures requiring prolonged anesthesia >4 hours or large fluid shifts;    INTERMEDIATE=abdominal, most orthopedic and intrathoracic surgery; LOW= endoscopy, cataract and breast surgery:467820} risk.    REVISED CARDIAC RISK INDEX  The patient has the following serious cardiovascular risks for perioperative complications such as (MI, PE, VFib and 3  AV Block):  {PREOP REVISED CARDIAC INDEX (RCI):514470:p:\"No serious cardiac risks\"}  INTERPRETATION: {REVISED CARDIAC RISK INTERPRETATION:256818}    The patient has the following additional risks for perioperative complications:  {Additional perioperative risks:720127:p:\"No identified additional risks\"}      ICD-10-CM    1. Pre-op exam Z01.818 EKG 12-lead complete w/read - Clinics   2. Preop general physical exam Z01.818        RECOMMENDATIONS:     {IMPORTANT - Conditions - complete carefully!!:552410}    {IMPORTANT - Medications:227574::\"--Patient is to take all scheduled medications on the day of surgery EXCEPT for modifications listed below.\"}    {IMPORTANT - Approval:497504:p:\"APPROVAL GIVEN to proceed with proposed procedure, without further diagnostic evaluation\"}       Signed Electronically by: Anh Spivey NP    Copy of this evaluation report is provided to requesting physician.    [facility] Preop Guidelines    Revised Cardiac Risk Index    [place] [address]  [facility] " [address]  [phone]  Dept: [phone]    PRE-OP EVALUATION:  Today's date: 2018    Lance Ibrahim (: 1945) presents for pre-operative evaluation assessment as requested by  ***.  He requires evaluation and anesthesia risk assessment prior to undergoing surgery/procedure for treatment of *** .    {PREOP QUESTIONNAIRE OPTIONS (by MA):888708}    HPI:     HPI related to upcoming procedure: ***      {Ch. Problems:058883}    MEDICAL HISTORY:     Patient Active Problem List    Diagnosis Date Noted     Type 2 diabetes, HbA1c goal < 7% (H) 2013     Priority: High     HTN (hypertension) 2008     Priority: High     Transient cerebral ischemia 2004     Priority: High     Problem list name updated by automated process. Provider to review       Amputated toe, left (H) 2018     Priority: Medium     Type 2 diabetes mellitus with peripheral vascular disease (H) 2018     Priority: Medium     Diabetic foot infection (H) 10/14/2018     Priority: Medium     Cellulitis 2018     Priority: Medium     Sepsis (H) 2018     Priority: Medium     Cervicalgia 2017     Priority: Medium     Benign essential hypertension 2016     Priority: Medium     Obesity 10/14/2015     Priority: Medium     Diabetes mellitus type 2, uncomplicated (H) 10/14/2015     Priority: Medium     CARDIOVASCULAR SCREENING; LDL GOAL LESS THAN 100 10/31/2010     Priority: Medium     Hypertrophy of prostate with urinary obstruction 2004     Priority: Medium     Problem list name updated by automated process. Provider to review       Ventral hernia 2004     Priority: Medium     Problem list name updated by automated process. Provider to review       Gout 2012     Priority: Low     Chronic rhinitis 2004     Priority: Low      Past Medical History:   Diagnosis Date     Arthritis     osteoarthritis knees     Cerebral artery occlusion with cerebral infarction (H)     TIA , no  residual     Chronic rhinitis      Diabetes mellitus (H)      HTN (hypertension)      Hypertrophy of prostate with urinary obstruction and other lower urinary tract symptoms (LUTS)      Sleep apnea     doesn't use cpap     TIA (transient ischaemic attack) 1993     Unspecified transient cerebral ischemia 1993    No definite source found     Past Surgical History:   Procedure Laterality Date     AMPUTATE TOE(S) Left 10/15/2018    Procedure: AMPUTATE TOE(S);  Left third toe amputation;  Surgeon: Ayaka Azevedo DPM, Podiatry/Foot and Ankle Surgery;  Location: [facility] OR     C NONSPECIFIC PROCEDURE  1985    Diastasis recti repair     C NONSPECIFIC PROCEDURE  1987    Ventral hernia repair     C NONSPECIFIC PROCEDURE      ORIF of left shoulder     COLONOSCOPY  3/9/2013    Procedure: COLONOSCOPY;  COLONOSCOPY;  Surgeon: Chau Hogan MD;  Location:  GI     EYE SURGERY  03/13/2017    left eye cataract     FOOT SURGERY  4/2013    cyst removal      HERNIA REPAIR  7/13/2004    ventral      Current Outpatient Prescriptions   Medication Sig Dispense Refill     Acetaminophen (TYLENOL PO) Take 650 mg by mouth every 6 hours as needed for mild pain or fever       aspirin 325 MG tablet Take 325 mg by mouth daily        blood glucose monitoring (ACCU-CHEK FASTCLIX) lancets 1 EACH BY IN VITRO ROUTE 2 TIMES DAILY 204 each 1     blood glucose monitoring (ACCU-CHEK SMARTVIEW) test strip USE TO TEST TWICE DAILY 200 strip 1     ferrous gluconate (FERGON) 324 (38 FE) MG tablet Take 1 tablet by mouth daily.       finasteride (PROSCAR) 5 MG tablet Take 5 mg by mouth daily.       fluticasone (FLONASE) 50 MCG/ACT spray Spray 1 spray into both nostrils 2 times daily       lisinopril (PRINIVIL/ZESTRIL) 10 MG tablet TAKE 2 TABLETS (20 MG) BY MOUTH DAILY  3     loratadine (CLARITIN) 10 MG tablet Take 10 mg by mouth At Bedtime       metFORMIN (GLUCOPHAGE) 1000 MG tablet TAKE 1 TABLET (1,000 MG) BY MOUTH 2 TIMES DAILY (WITH MEALS) **DUE FOR APRIL  "APPT/LABS. CALL MD** 180 tablet 1     Multiple Vitamins-Minerals (MULTIVITAMIN ADULTS PO) Take by mouth daily       tamsulosin (FLOMAX) 0.4 MG 24 hr capsule Take 1 capsule by mouth daily.       Wound Dressings ([facility] MANUKA HONEY WOUND) GEL Externally apply 1 mL topically daily 1 Tube 2     OTC products: {OTC ANALGESICS:075586}    Allergies   Allergen Reactions     Penicillins Anaphylaxis     \"anaphylactic\"     Sulfa Drugs      \"itchy rash, swelling of face and hands\"     Ancef [Cefazolin] Rash      Latex Allergy: {YES/NO WITH DEFAULT:258133::\"NO\"}    Social History   Substance Use Topics     Smoking status: Former Smoker     Quit date: 3/17/1993     Smokeless tobacco: Never Used     Alcohol use Yes      Comment: Occ, Once a month     History   Drug Use No       REVIEW OF SYSTEMS:   {ROS Preop Choices:300966}    EXAM:   /78 (BP Location: Right arm, Patient Position: Sitting, Cuff Size: Adult Large)  Pulse 92  Temp 98.4  F (36.9  C) (Oral)  Resp 16  Ht 6' (1.829 m)  Wt 260 lb 4.8 oz (118.1 kg)  SpO2 96%  BMI 35.3 kg/m2  {EXAM Preop Choices:211206}    DIAGNOSTICS:   {DIAGNOSTIC FOR PREOP:861714}    Recent Labs   Lab Test  11/05/18   0944 10/30/18  10/22/18   0628   10/17/18   0709   05/01/18   1951   12/24/12   0804   HGB   --   11.0*  11.1*   < >   --    < >   --    < >  13.2*   PLT   --   535*  488*   < >   --    < >  193   < >  227   INR   --    --    --    --    --    --    --    --   1.05   NA  139  137  133   < >   --    < >   --    < >   --    POTASSIUM  4.7  5.2*  4.9   < >   --    < >   --    < >   --    CR  1.39*  1.08  1.01   < >  1.15   < >  1.06   < >   --    A1C   --    --    --    --   6.4*   --   6.7*   < >   --     < > = values in this interval not displayed.        IMPRESSION:   {PREOP REASONS:465234::\"Reason for surgery/procedure: ***\",\"Diagnosis/reason for consult: ***\"}    The proposed surgical procedure is considered {HIGH=major cardiovascular or procedures requiring prolonged " "anesthesia >4 hours or large fluid shifts;    INTERMEDIATE=abdominal, most orthopedic and intrathoracic surgery; LOW= endoscopy, cataract and breast surgery:917031} risk.    REVISED CARDIAC RISK INDEX  The patient has the following serious cardiovascular risks for perioperative complications such as (MI, PE, VFib and 3  AV Block):  {PREOP REVISED CARDIAC INDEX (RCI):120172:p:\"No serious cardiac risks\"}  INTERPRETATION: {REVISED CARDIAC RISK INTERPRETATION:538652}    The patient has the following additional risks for perioperative complications:  {Additional perioperative risks:003426:p:\"No identified additional risks\"}      ICD-10-CM    1. Pre-op exam Z01.818 EKG 12-lead complete w/read - Clinics     Methicillin Resist/Sens S. aureus PCR   2. Preop general physical exam Z01.818        RECOMMENDATIONS:     {IMPORTANT - Conditions - complete carefully!!:734340}    {IMPORTANT - Medications:546412::\"--Patient is to take all scheduled medications on the day of surgery EXCEPT for modifications listed below.\"}    {IMPORTANT - Approval:126384:p:\"APPROVAL GIVEN to proceed with proposed procedure, without further diagnostic evaluation\"}       Signed Electronically by: Anh Spivey NP    Copy of this evaluation report is provided to requesting physician.    [facility] Preop Guidelines    Revised Cardiac Risk Index    [place] [address]  [facility] [address]  [phone]  Dept: [phone]    PRE-OP EVALUATION:  Today's date: 2018    Lance Ibrahim (: 1945) presents for pre-operative evaluation assessment as requested by Dr. roland.  He requires evaluation and anesthesia risk assessment prior to undergoing surgery/procedure for treatment of R knee DJD .    Fax number for surgical facility: [facility] special[facility]  Primary Physician: Anh Spivey  Type of Anesthesia Anticipated: General    Patient has a Health Care Directive or Living Will:  YES    HPI:     HPI related to " upcoming procedure: ***      {. Problems:371625}    MEDICAL HISTORY:     Patient Active Problem List    Diagnosis Date Noted     Type 2 diabetes, HbA1c goal < 7% (H) 02/22/2013     Priority: High     HTN (hypertension) 07/06/2008     Priority: High     Transient cerebral ischemia 07/09/2004     Priority: High     Problem list name updated by automated process. Provider to review       Amputated toe, left (H) 11/08/2018     Priority: Medium     Type 2 diabetes mellitus with peripheral vascular disease (H) 11/08/2018     Priority: Medium     Diabetic foot infection (H) 10/14/2018     Priority: Medium     Cellulitis 06/19/2018     Priority: Medium     Sepsis (H) 05/01/2018     Priority: Medium     Cervicalgia 02/24/2017     Priority: Medium     Benign essential hypertension 02/17/2016     Priority: Medium     Obesity 10/14/2015     Priority: Medium     Diabetes mellitus type 2, uncomplicated (H) 10/14/2015     Priority: Medium     CARDIOVASCULAR SCREENING; LDL GOAL LESS THAN 100 10/31/2010     Priority: Medium     Hypertrophy of prostate with urinary obstruction 07/09/2004     Priority: Medium     Problem list name updated by automated process. Provider to review       Ventral hernia 06/28/2004     Priority: Medium     Problem list name updated by automated process. Provider to review       Gout 07/20/2012     Priority: Low     Chronic rhinitis 07/09/2004     Priority: Low      Past Medical History:   Diagnosis Date     Arthritis     osteoarthritis knees     Cerebral artery occlusion with cerebral infarction (H)     TIA 1993, no residual     Chronic rhinitis      Diabetes mellitus (H)      HTN (hypertension)      Hypertrophy of prostate with urinary obstruction and other lower urinary tract symptoms (LUTS)      Sleep apnea     doesn't use cpap     TIA (transient ischaemic attack) 1993     Unspecified transient cerebral ischemia 1993    No definite source found     Past Surgical History:   Procedure Laterality Date      AMPUTATE TOE(S) Left 10/15/2018    Procedure: AMPUTATE TOE(S);  Left third toe amputation;  Surgeon: Ayaka Azevedo DPM, Podiatry/Foot and Ankle Surgery;  Location: [facility] OR     C NONSPECIFIC PROCEDURE  1985    Diastasis recti repair     C NONSPECIFIC PROCEDURE  1987    Ventral hernia repair     C NONSPECIFIC PROCEDURE      ORIF of left shoulder     COLONOSCOPY  3/9/2013    Procedure: COLONOSCOPY;  COLONOSCOPY;  Surgeon: Chau Hogan MD;  Location:  GI     EYE SURGERY  03/13/2017    left eye cataract     FOOT SURGERY  4/2013    cyst removal      HERNIA REPAIR  7/13/2004    ventral      Current Outpatient Prescriptions   Medication Sig Dispense Refill     Acetaminophen (TYLENOL PO) Take 650 mg by mouth every 6 hours as needed for mild pain or fever       aspirin 325 MG tablet Take 325 mg by mouth daily        blood glucose monitoring (ACCU-CHEK FASTCLIX) lancets 1 EACH BY IN VITRO ROUTE 2 TIMES DAILY 204 each 1     blood glucose monitoring (ACCU-CHEK SMARTVIEW) test strip USE TO TEST TWICE DAILY 200 strip 1     ferrous gluconate (FERGON) 324 (38 FE) MG tablet Take 1 tablet by mouth daily.       finasteride (PROSCAR) 5 MG tablet Take 5 mg by mouth daily.       fluticasone (FLONASE) 50 MCG/ACT spray Spray 1 spray into both nostrils 2 times daily       lisinopril (PRINIVIL/ZESTRIL) 10 MG tablet TAKE 2 TABLETS (20 MG) BY MOUTH DAILY  3     loratadine (CLARITIN) 10 MG tablet Take 10 mg by mouth At Bedtime       metFORMIN (GLUCOPHAGE) 1000 MG tablet TAKE 1 TABLET (1,000 MG) BY MOUTH 2 TIMES DAILY (WITH MEALS) **DUE FOR APRIL APPT/LABS. CALL MD** 180 tablet 1     Multiple Vitamins-Minerals (MULTIVITAMIN ADULTS PO) Take by mouth daily       tamsulosin (FLOMAX) 0.4 MG 24 hr capsule Take 1 capsule by mouth daily.       Wound Dressings ([facility] MANUKA HONEY WOUND) GEL Externally apply 1 mL topically daily 1 Tube 2     OTC products: {OTC ANALGESICS:339624}    Allergies   Allergen Reactions     Penicillins Anaphylaxis      "\"anaphylactic\"     Sulfa Drugs      \"itchy rash, swelling of face and hands\"     Ancef [Cefazolin] Rash      Latex Allergy: {YES/NO WITH DEFAULT:114907::\"NO\"}    Social History   Substance Use Topics     Smoking status: Former Smoker     Quit date: 3/17/1993     Smokeless tobacco: Never Used     Alcohol use Yes      Comment: Occ, Once a month     History   Drug Use No       REVIEW OF SYSTEMS:   {ROS Preop Choices:361895}    EXAM:   /78 (BP Location: Right arm, Patient Position: Sitting, Cuff Size: Adult Large)  Pulse 92  Temp 98.4  F (36.9  C) (Oral)  Resp 16  Ht 6' (1.829 m)  Wt 260 lb 4.8 oz (118.1 kg)  SpO2 96%  BMI 35.3 kg/m2  {EXAM Preop Choices:041909}    DIAGNOSTICS:   {DIAGNOSTIC FOR PREOP:388571}    Recent Labs   Lab Test  11/05/18   0944 10/30/18  10/22/18   0628   10/17/18   0709   05/01/18   1951   12/24/12   0804   HGB   --   11.0*  11.1*   < >   --    < >   --    < >  13.2*   PLT   --   535*  488*   < >   --    < >  193   < >  227   INR   --    --    --    --    --    --    --    --   1.05   NA  139  137  133   < >   --    < >   --    < >   --    POTASSIUM  4.7  5.2*  4.9   < >   --    < >   --    < >   --    CR  1.39*  1.08  1.01   < >  1.15   < >  1.06   < >   --    A1C   --    --    --    --   6.4*   --   6.7*   < >   --     < > = values in this interval not displayed.        IMPRESSION:   {PREOP REASONS:540407::\"Reason for surgery/procedure: ***\",\"Diagnosis/reason for consult: ***\"}    The proposed surgical procedure is considered {HIGH=major cardiovascular or procedures requiring prolonged anesthesia >4 hours or large fluid shifts;    INTERMEDIATE=abdominal, most orthopedic and intrathoracic surgery; LOW= endoscopy, cataract and breast surgery:397258} risk.    REVISED CARDIAC RISK INDEX  The patient has the following serious cardiovascular risks for perioperative complications such as (MI, PE, VFib and 3  AV Block):  {PREOP REVISED CARDIAC INDEX (RCI):850443:p:\"No serious cardiac " "risks\"}  INTERPRETATION: {REVISED CARDIAC RISK INTERPRETATION:314980}    The patient has the following additional risks for perioperative complications:  {Additional perioperative risks:742764:p:\"No identified additional risks\"}      ICD-10-CM    1. Pre-op exam Z01.818 EKG 12-lead complete w/read - Clinics     Methicillin Resist/Sens S. aureus PCR     CBC with platelets     Basic metabolic panel     UA reflex to Microscopic and Culture   2. Preop general physical exam Z01.818        RECOMMENDATIONS:     {IMPORTANT - Conditions - complete carefully!!:297718}    {IMPORTANT - Medications:081490::\"--Patient is to take all scheduled medications on the day of surgery EXCEPT for modifications listed below.\"}    {IMPORTANT - Approval:602238:p:\"APPROVAL GIVEN to proceed with proposed procedure, without further diagnostic evaluation\"}       Signed Electronically by: Anh Spivey NP    Copy of this evaluation report is provided to requesting physician.    [facility] Preop Guidelines    Revised Cardiac Risk Index  "

## 2018-11-26 NOTE — PROGRESS NOTES
[place] [address]  [facility] [address]  [phone]  Dept: [phone]    PRE-OP EVALUATION:  Today's date: 2018    Lance Ibrahim (: 1945) presents for pre-operative evaluation assessment as requested by Dr. Cunha.  He requires evaluation and anesthesia risk assessment prior to undergoing surgery/procedure for treatment of Rt knee DJD .    Proposed Surgery/ Procedure: R knee replacement  Date of Surgery/ Procedure: 18  Time of Surgery/ Procedure: 730  Hospital/Surgical Facility: [facility]  Primary Physician: Anh Spivey  Type of Anesthesia Anticipated: General    Patient has a Health Care Directive or Living Will:  yes    1. YES - DO YOU HAVE A HISTORY OF HEART ATTACK, STROKE, STENT, BYPASS OR SURGERY ON AN ARTERY IN THE HEAD, NECK, HEART OR LEG?  H/o stroke  2. NO - Do you ever have any pain or discomfort in your chest?  3. NO - Do you have a history of  Heart Failure?  4. NO - Are you troubled by shortness of breath when: walking on the level, up a slight hill or at night?  5. NO - Do you currently have a cold, bronchitis or other respiratory infection?  6. NO - Do you have a cough, shortness of breath or wheezing?  7. NO - Do you sometimes get pains in the calves of your legs when you walk?  8. NO - Do you or anyone in your family have previous history of blood clots?  9. NO - Do you or does anyone in your family have a serious bleeding problem such as prolonged bleeding following surgeries or cuts?  10. NO - Have you ever had problems with anemia or been told to take iron pills?  11. NO - Have you had any abnormal blood loss such as black, tarry or bloody stools, or abnormal vaginal bleeding?  12. NO - Have you ever had a blood transfusion?  13. NO - Have you or any of your relatives ever had problems with anesthesia?  14. NO - Do you have sleep apnea, excessive snoring or daytime drowsiness?  15. NO - Do you have any prosthetic heart valves?  16.  NO - Do you have prosthetic joints?  17. NO - Is there any chance that you may be pregnant?      HPI:     HPI related to upcoming procedure:       See problem list for active medical problems.  Problems all longstanding and stable, except as noted/documented.  See ROS for pertinent symptoms related to these conditions.                                                                                                                                                          .    MEDICAL HISTORY:     Patient Active Problem List    Diagnosis Date Noted     Type 2 diabetes, HbA1c goal < 7% (H) 02/22/2013     Priority: High     HTN (hypertension) 07/06/2008     Priority: High     Transient cerebral ischemia 07/09/2004     Priority: High     Problem list name updated by automated process. Provider to review       Amputated toe, left (H) 11/08/2018     Priority: Medium     Type 2 diabetes mellitus with peripheral vascular disease (H) 11/08/2018     Priority: Medium     Diabetic foot infection (H) 10/14/2018     Priority: Medium     Cellulitis 06/19/2018     Priority: Medium     Sepsis (H) 05/01/2018     Priority: Medium     Cervicalgia 02/24/2017     Priority: Medium     Benign essential hypertension 02/17/2016     Priority: Medium     Obesity 10/14/2015     Priority: Medium     Diabetes mellitus type 2, uncomplicated (H) 10/14/2015     Priority: Medium     CARDIOVASCULAR SCREENING; LDL GOAL LESS THAN 100 10/31/2010     Priority: Medium     Hypertrophy of prostate with urinary obstruction 07/09/2004     Priority: Medium     Problem list name updated by automated process. Provider to review       Ventral hernia 06/28/2004     Priority: Medium     Problem list name updated by automated process. Provider to review       Gout 07/20/2012     Priority: Low     Chronic rhinitis 07/09/2004     Priority: Low      Past Medical History:   Diagnosis Date     Arthritis     osteoarthritis knees     Cerebral artery occlusion with cerebral  infarction (H)     TIA 1993, no residual     Chronic rhinitis      Diabetes mellitus (H)      HTN (hypertension)      Hypertrophy of prostate with urinary obstruction and other lower urinary tract symptoms (LUTS)      Sleep apnea     doesn't use cpap     TIA (transient ischaemic attack) 1993     Unspecified transient cerebral ischemia 1993    No definite source found     Past Surgical History:   Procedure Laterality Date     AMPUTATE TOE(S) Left 10/15/2018    Procedure: AMPUTATE TOE(S);  Left third toe amputation;  Surgeon: Ayaka Azevedo DPM, Podiatry/Foot and Ankle Surgery;  Location: [facility] OR     C NONSPECIFIC PROCEDURE  1985    Diastasis recti repair     C NONSPECIFIC PROCEDURE  1987    Ventral hernia repair     C NONSPECIFIC PROCEDURE      ORIF of left shoulder     COLONOSCOPY  3/9/2013    Procedure: COLONOSCOPY;  COLONOSCOPY;  Surgeon: Chau Hogan MD;  Location:  GI     EYE SURGERY  03/13/2017    left eye cataract     FOOT SURGERY  4/2013    cyst removal      HERNIA REPAIR  7/13/2004    ventral      Current Outpatient Prescriptions   Medication Sig Dispense Refill     Acetaminophen (TYLENOL PO) Take 650 mg by mouth every 6 hours as needed for mild pain or fever       aspirin 325 MG tablet Take 325 mg by mouth daily        blood glucose monitoring (ACCU-CHEK FASTCLIX) lancets 1 EACH BY IN VITRO ROUTE 2 TIMES DAILY 204 each 1     blood glucose monitoring (ACCU-CHEK SMARTVIEW) test strip USE TO TEST TWICE DAILY 200 strip 1     ferrous gluconate (FERGON) 324 (38 FE) MG tablet Take 1 tablet by mouth daily.       finasteride (PROSCAR) 5 MG tablet Take 5 mg by mouth daily.       fluticasone (FLONASE) 50 MCG/ACT spray Spray 1 spray into both nostrils 2 times daily       lisinopril (PRINIVIL/ZESTRIL) 10 MG tablet TAKE 2 TABLETS (20 MG) BY MOUTH DAILY  3     loratadine (CLARITIN) 10 MG tablet Take 10 mg by mouth At Bedtime       metFORMIN (GLUCOPHAGE) 1000 MG tablet TAKE 1 TABLET (1,000 MG) BY MOUTH 2 TIMES DAILY  "(WITH MEALS) **DUE FOR APRIL APPT/LABS. CALL MD** 180 tablet 1     Multiple Vitamins-Minerals (MULTIVITAMIN ADULTS PO) Take by mouth daily       tamsulosin (FLOMAX) 0.4 MG 24 hr capsule Take 1 capsule by mouth daily.       Wound Dressings ([employer] HONEY WOUND) GEL Externally apply 1 mL topically daily 1 Tube 2     OTC products: None, except as noted above    Allergies   Allergen Reactions     Penicillins Anaphylaxis     \"anaphylactic\"     Sulfa Drugs      \"itchy rash, swelling of face and hands\"     Ancef [Cefazolin] Rash      Latex Allergy: NO    Social History   Substance Use Topics     Smoking status: Former Smoker     Quit date: 3/17/1993     Smokeless tobacco: Never Used     Alcohol use Yes      Comment: Occ, Once a month     History   Drug Use No       REVIEW OF SYSTEMS:   CONSTITUTIONAL: NEGATIVE for fever, chills, change in weight  ENT/MOUTH: NEGATIVE for ear, mouth and throat problems  RESP: NEGATIVE for significant cough or SOB  CV: NEGATIVE for chest pain, palpitations or peripheral edema  GI: NEGATIVE for nausea, abdominal pain, heartburn, or change in bowel habits  : NEGATIVE for frequency, dysuria, or hematuria  MUSCULOSKELETAL: NEGATIVE for significant arthralgias or myalgia  NEURO: NEGATIVE for weakness, dizziness or paresthesias  ENDOCRINE: NEGATIVE for temperature intolerance, skin/hair changes  HEME: NEGATIVE for bleeding problems    EXAM:   /78 (BP Location: Right arm, Patient Position: Sitting, Cuff Size: Adult Large)  Pulse 92  Temp 98.4  F (36.9  C) (Oral)  Resp 16  Ht 6' (1.829 m)  Wt 260 lb 4.8 oz (118.1 kg)  SpO2 96%  BMI 35.3 kg/m2    GENERAL APPEARANCE: alert, active and over weight     NECK: no adenopathy, no asymmetry, masses, or scars and thyroid normal to palpation     RESP: lungs clear to auscultation - no rales, rhonchi or wheezes     CV: regular rates and rhythm, normal S1 S2, no S3 or S4 and no murmur, click or rub     ABDOMEN:  soft, nontender, no HSM or masses " and bowel sounds normal     MS: extremities normal- no gross deformities noted, no evidence of inflammation in joints, FROM in all extremities.     SKIN: no suspicious lesions or rashes     NEURO: Normal strength and tone, sensory exam grossly normal, mentation intact and speech normal     PSYCH: mentation appears normal. and affect normal/bright    DIAGNOSTICS:   EKG: appears normal, NSR    Recent Labs   Lab Test  11/05/18   0944 10/30/18  10/22/18   0628   10/17/18   0709   05/01/18   1951   12/24/12   0804   HGB   --   11.0*  11.1*   < >   --    < >   --    < >  13.2*   PLT   --   535*  488*   < >   --    < >  193   < >  227   INR   --    --    --    --    --    --    --    --   1.05   NA  139  137  133   < >   --    < >   --    < >   --    POTASSIUM  4.7  5.2*  4.9   < >   --    < >   --    < >   --    CR  1.39*  1.08  1.01   < >  1.15   < >  1.06   < >   --    A1C   --    --    --    --   6.4*   --   6.7*   < >   --     < > = values in this interval not displayed.        IMPRESSION:   Reason for surgery/procedure: R Knee DJD    The proposed surgical procedure is considered INTERMEDIATE risk.    REVISED CARDIAC RISK INDEX  The patient has the following serious cardiovascular risks for perioperative complications such as (MI, PE, VFib and 3  AV Block):  Cerebrovascular Disease (TIA or CVA)  INTERPRETATION: 1 risks: Class II (low risk - 0.9% complication rate)    The patient has the following additional risks for perioperative complications:  No identified additional risks      ICD-10-CM    1. Pre-op exam Z01.818 EKG 12-lead complete w/read - Clinics     Methicillin Resist/Sens S. aureus PCR     CBC with platelets     Basic metabolic panel     UA reflex to Microscopic and Culture   2. Preop general physical exam Z01.818      (Z01.818) Pre-op exam  (primary encounter diagnosis)  Comment:   Plan: EKG 12-lead complete w/read - Clinics,         Methicillin Resist/Sens S. aureus PCR, CBC with        platelets, Basic  metabolic panel, UA reflex to         Microscopic and Culture, Urine Microscopic              RECOMMENDATIONS:     --Consult hospital rounder / IM to assist post-op medical management    --Patient is to take all scheduled medications on the day of surgery EXCEPT for modifications listed below.    APPROVAL GIVEN to proceed with proposed procedure, without further diagnostic evaluation       Signed Electronically by: Anh Spivey NP    Copy of this evaluation report is provided to requesting physician.    [facility] Preop Guidelines    Revised Cardiac Risk Index

## 2018-11-26 NOTE — PROGRESS NOTES
"[place] [address] [facility] [address]  [phone]  Dept: [phone]    PRE-OP EVALUATION:  Today's date: 2018    Lance Ibrahim (: 1945) presents for pre-operative evaluation assessment as requested by  ***.  He requires evaluation and anesthesia risk assessment prior to undergoing surgery/procedure for treatment of *** .    Fax number for surgical facility: ***  Primary Physician: Anh Spivey  Type of Anesthesia Anticipated: {ANESTHESIA:914148}    Patient has a Health Care Directive or Living Will:  {YES/NO:854283::\"NO\"}    No flowsheet data found.    HPI:     HPI related to upcoming procedure: ***      {Ch. Problems:785253}    MEDICAL HISTORY:     Patient Active Problem List    Diagnosis Date Noted     Type 2 diabetes, HbA1c goal < 7% (H) 2013     Priority: High     HTN (hypertension) 2008     Priority: High     Transient cerebral ischemia 2004     Priority: High     Problem list name updated by automated process. Provider to review       Amputated toe, left (H) 2018     Priority: Medium     Type 2 diabetes mellitus with peripheral vascular disease (H) 2018     Priority: Medium     Diabetic foot infection (H) 10/14/2018     Priority: Medium     Cellulitis 2018     Priority: Medium     Sepsis (H) 2018     Priority: Medium     Cervicalgia 2017     Priority: Medium     Benign essential hypertension 2016     Priority: Medium     Obesity 10/14/2015     Priority: Medium     Diabetes mellitus type 2, uncomplicated (H) 10/14/2015     Priority: Medium     CARDIOVASCULAR SCREENING; LDL GOAL LESS THAN 100 10/31/2010     Priority: Medium     Hypertrophy of prostate with urinary obstruction 2004     Priority: Medium     Problem list name updated by automated process. Provider to review       Ventral hernia 2004     Priority: Medium     Problem list name updated by automated process. Provider " to review       Gout 07/20/2012     Priority: Low     Chronic rhinitis 07/09/2004     Priority: Low      Past Medical History:   Diagnosis Date     Arthritis     osteoarthritis knees     Cerebral artery occlusion with cerebral infarction (H)     TIA 1993, no residual     Chronic rhinitis      Diabetes mellitus (H)      HTN (hypertension)      Hypertrophy of prostate with urinary obstruction and other lower urinary tract symptoms (LUTS)      Sleep apnea     doesn't use cpap     TIA (transient ischaemic attack) 1993     Unspecified transient cerebral ischemia 1993    No definite source found     Past Surgical History:   Procedure Laterality Date     AMPUTATE TOE(S) Left 10/15/2018    Procedure: AMPUTATE TOE(S);  Left third toe amputation;  Surgeon: Ayaka Azevedo DPM, Podiatry/Foot and Ankle Surgery;  Location: [facility] OR     C NONSPECIFIC PROCEDURE  1985    Diastasis recti repair     C NONSPECIFIC PROCEDURE  1987    Ventral hernia repair     C NONSPECIFIC PROCEDURE      ORIF of left shoulder     COLONOSCOPY  3/9/2013    Procedure: COLONOSCOPY;  COLONOSCOPY;  Surgeon: Chau Hogan MD;  Location:  GI     EYE SURGERY  03/13/2017    left eye cataract     FOOT SURGERY  4/2013    cyst removal      HERNIA REPAIR  7/13/2004    ventral      Current Outpatient Prescriptions   Medication Sig Dispense Refill     Acetaminophen (TYLENOL PO) Take 650 mg by mouth every 6 hours as needed for mild pain or fever       aspirin 325 MG tablet Take 325 mg by mouth daily        blood glucose monitoring (ACCU-CHEK FASTCLIX) lancets 1 EACH BY IN VITRO ROUTE 2 TIMES DAILY 204 each 1     blood glucose monitoring (ACCU-CHEK SMARTVIEW) test strip USE TO TEST TWICE DAILY 200 strip 1     ferrous gluconate (FERGON) 324 (38 FE) MG tablet Take 1 tablet by mouth daily.       finasteride (PROSCAR) 5 MG tablet Take 5 mg by mouth daily.       fluticasone (FLONASE) 50 MCG/ACT spray Spray 1 spray into both nostrils 2 times daily       lisinopril  "(PRINIVIL/ZESTRIL) 10 MG tablet TAKE 2 TABLETS (20 MG) BY MOUTH DAILY  3     loratadine (CLARITIN) 10 MG tablet Take 10 mg by mouth At Bedtime       metFORMIN (GLUCOPHAGE) 1000 MG tablet TAKE 1 TABLET (1,000 MG) BY MOUTH 2 TIMES DAILY (WITH MEALS) **DUE FOR APRIL APPT/LABS. CALL MD** 180 tablet 1     Multiple Vitamins-Minerals (MULTIVITAMIN ADULTS PO) Take by mouth daily       tamsulosin (FLOMAX) 0.4 MG 24 hr capsule Take 1 capsule by mouth daily.       Wound Dressings ([facility] MANUKA HONEY WOUND) GEL Externally apply 1 mL topically daily 1 Tube 2     OTC products: {OTC ANALGESICS:302295}    Allergies   Allergen Reactions     Penicillins Anaphylaxis     \"anaphylactic\"     Sulfa Drugs      \"itchy rash, swelling of face and hands\"     Ancef [Cefazolin] Rash      Latex Allergy: {YES/NO WITH DEFAULT:197291::\"NO\"}    Social History   Substance Use Topics     Smoking status: Former Smoker     Quit date: 3/17/1993     Smokeless tobacco: Never Used     Alcohol use Yes      Comment: Occ, Once a month     History   Drug Use No       REVIEW OF SYSTEMS:   {ROS Preop Choices:648746}    EXAM:   /78 (BP Location: Right arm, Patient Position: Sitting, Cuff Size: Adult Large)  Pulse 92  Temp 98.4  F (36.9  C) (Oral)  Resp 16  Ht 6' (1.829 m)  Wt 260 lb 4.8 oz (118.1 kg)  SpO2 96%  BMI 35.3 kg/m2  {EXAM Preop Choices:885425}    DIAGNOSTICS:   {DIAGNOSTIC FOR PREOP:908997}    Recent Labs   Lab Test  11/05/18   0944 10/30/18  10/22/18   0628   10/17/18   0709   05/01/18   1951   12/24/12   0804   HGB   --   11.0*  11.1*   < >   --    < >   --    < >  13.2*   PLT   --   535*  488*   < >   --    < >  193   < >  227   INR   --    --    --    --    --    --    --    --   1.05   NA  139  137  133   < >   --    < >   --    < >   --    POTASSIUM  4.7  5.2*  4.9   < >   --    < >   --    < >   --    CR  1.39*  1.08  1.01   < >  1.15   < >  1.06   < >   --    A1C   --    --    --    --   6.4*   --   6.7*   < >   --     < > = values in " "this interval not displayed.        IMPRESSION:   {PREOP REASONS:975228::\"Reason for surgery/procedure: ***\",\"Diagnosis/reason for consult: ***\"}    The proposed surgical procedure is considered {HIGH=major cardiovascular or procedures requiring prolonged anesthesia >4 hours or large fluid shifts;    INTERMEDIATE=abdominal, most orthopedic and intrathoracic surgery; LOW= endoscopy, cataract and breast surgery:075396} risk.    REVISED CARDIAC RISK INDEX  The patient has the following serious cardiovascular risks for perioperative complications such as (MI, PE, VFib and 3  AV Block):  {PREOP REVISED CARDIAC INDEX (RCI):353251:p:\"No serious cardiac risks\"}  INTERPRETATION: {REVISED CARDIAC RISK INTERPRETATION:651338}    The patient has the following additional risks for perioperative complications:  {Additional perioperative risks:830893:p:\"No identified additional risks\"}      ICD-10-CM    1. Pre-op exam Z01.818 EKG 12-lead complete w/read - Clinics     Methicillin Resist/Sens S. aureus PCR   2. Preop general physical exam Z01.818        RECOMMENDATIONS:     {IMPORTANT - Conditions - complete carefully!!:159733}    {IMPORTANT - Medications:985769::\"--Patient is to take all scheduled medications on the day of surgery EXCEPT for modifications listed below.\"}    {IMPORTANT - Approval:998584:p:\"APPROVAL GIVEN to proceed with proposed procedure, without further diagnostic evaluation\"}       Signed Electronically by: Anh Spivey NP    Copy of this evaluation report is provided to requesting physician.    [place] Preop Guidelines    Revised Cardiac Risk Index    [place] [address] [place]  [facility] [address]  [phone]  Dept: [phone]    PRE-OP EVALUATION:  Today's date: 2018    Lance Ibrahim (: 1945) presents for pre-operative evaluation assessment as requested by  ***.  He requires evaluation and anesthesia risk assessment prior to undergoing surgery/procedure " for treatment of *** .    {PREOP QUESTIONNAIRE OPTIONS (by MA):984655}    HPI:     HPI related to upcoming procedure: ***      {. Problems:134821}    MEDICAL HISTORY:     Patient Active Problem List    Diagnosis Date Noted     Type 2 diabetes, HbA1c goal < 7% (H) 02/22/2013     Priority: High     HTN (hypertension) 07/06/2008     Priority: High     Transient cerebral ischemia 07/09/2004     Priority: High     Problem list name updated by automated process. Provider to review       Amputated toe, left (H) 11/08/2018     Priority: Medium     Type 2 diabetes mellitus with peripheral vascular disease (H) 11/08/2018     Priority: Medium     Diabetic foot infection (H) 10/14/2018     Priority: Medium     Cellulitis 06/19/2018     Priority: Medium     Sepsis (H) 05/01/2018     Priority: Medium     Cervicalgia 02/24/2017     Priority: Medium     Benign essential hypertension 02/17/2016     Priority: Medium     Obesity 10/14/2015     Priority: Medium     Diabetes mellitus type 2, uncomplicated (H) 10/14/2015     Priority: Medium     CARDIOVASCULAR SCREENING; LDL GOAL LESS THAN 100 10/31/2010     Priority: Medium     Hypertrophy of prostate with urinary obstruction 07/09/2004     Priority: Medium     Problem list name updated by automated process. Provider to review       Ventral hernia 06/28/2004     Priority: Medium     Problem list name updated by automated process. Provider to review       Gout 07/20/2012     Priority: Low     Chronic rhinitis 07/09/2004     Priority: Low      Past Medical History:   Diagnosis Date     Arthritis     osteoarthritis knees     Cerebral artery occlusion with cerebral infarction (H)     TIA 1993, no residual     Chronic rhinitis      Diabetes mellitus (H)      HTN (hypertension)      Hypertrophy of prostate with urinary obstruction and other lower urinary tract symptoms (LUTS)      Sleep apnea     doesn't use cpap     TIA (transient ischaemic attack) 1993     Unspecified transient cerebral  ischemia 1993    No definite source found     Past Surgical History:   Procedure Laterality Date     AMPUTATE TOE(S) Left 10/15/2018    Procedure: AMPUTATE TOE(S);  Left third toe amputation;  Surgeon: Ayaka Azevedo DPM, Podiatry/Foot and Ankle Surgery;  Location: [facility] OR     C NONSPECIFIC PROCEDURE  1985    Diastasis recti repair     C NONSPECIFIC PROCEDURE  1987    Ventral hernia repair     C NONSPECIFIC PROCEDURE      ORIF of left shoulder     COLONOSCOPY  3/9/2013    Procedure: COLONOSCOPY;  COLONOSCOPY;  Surgeon: Chau Hogan MD;  Location:  GI     EYE SURGERY  03/13/2017    left eye cataract     FOOT SURGERY  4/2013    cyst removal      HERNIA REPAIR  7/13/2004    ventral      Current Outpatient Prescriptions   Medication Sig Dispense Refill     Acetaminophen (TYLENOL PO) Take 650 mg by mouth every 6 hours as needed for mild pain or fever       aspirin 325 MG tablet Take 325 mg by mouth daily        blood glucose monitoring (ACCU-CHEK FASTCLIX) lancets 1 EACH BY IN VITRO ROUTE 2 TIMES DAILY 204 each 1     blood glucose monitoring (ACCU-CHEK SMARTVIEW) test strip USE TO TEST TWICE DAILY 200 strip 1     ferrous gluconate (FERGON) 324 (38 FE) MG tablet Take 1 tablet by mouth daily.       finasteride (PROSCAR) 5 MG tablet Take 5 mg by mouth daily.       fluticasone (FLONASE) 50 MCG/ACT spray Spray 1 spray into both nostrils 2 times daily       lisinopril (PRINIVIL/ZESTRIL) 10 MG tablet TAKE 2 TABLETS (20 MG) BY MOUTH DAILY  3     loratadine (CLARITIN) 10 MG tablet Take 10 mg by mouth At Bedtime       metFORMIN (GLUCOPHAGE) 1000 MG tablet TAKE 1 TABLET (1,000 MG) BY MOUTH 2 TIMES DAILY (WITH MEALS) **DUE FOR APRIL APPT/LABS. CALL MD** 180 tablet 1     Multiple Vitamins-Minerals (MULTIVITAMIN ADULTS PO) Take by mouth daily       tamsulosin (FLOMAX) 0.4 MG 24 hr capsule Take 1 capsule by mouth daily.       Wound Dressings ([facility] MANUKA HONEY WOUND) GEL Externally apply 1 mL topically daily 1 Tube 2     OTC  "products: {OTC ANALGESICS:977288}    Allergies   Allergen Reactions     Penicillins Anaphylaxis     \"anaphylactic\"     Sulfa Drugs      \"itchy rash, swelling of face and hands\"     Ancef [Cefazolin] Rash      Latex Allergy: {YES/NO WITH DEFAULT:879362::\"NO\"}    Social History   Substance Use Topics     Smoking status: Former Smoker     Quit date: 3/17/1993     Smokeless tobacco: Never Used     Alcohol use Yes      Comment: Occ, Once a month     History   Drug Use No       REVIEW OF SYSTEMS:   {ROS Preop Choices:008348}    EXAM:   /78 (BP Location: Right arm, Patient Position: Sitting, Cuff Size: Adult Large)  Pulse 92  Temp 98.4  F (36.9  C) (Oral)  Resp 16  Ht 6' (1.829 m)  Wt 260 lb 4.8 oz (118.1 kg)  SpO2 96%  BMI 35.3 kg/m2  {EXAM Preop Choices:115562}    DIAGNOSTICS:   {DIAGNOSTIC FOR PREOP:441726}    Recent Labs   Lab Test  11/05/18   0944 10/30/18  10/22/18   0628   10/17/18   0709   05/01/18   1951   12/24/12   0804   HGB   --   11.0*  11.1*   < >   --    < >   --    < >  13.2*   PLT   --   535*  488*   < >   --    < >  193   < >  227   INR   --    --    --    --    --    --    --    --   1.05   NA  139  137  133   < >   --    < >   --    < >   --    POTASSIUM  4.7  5.2*  4.9   < >   --    < >   --    < >   --    CR  1.39*  1.08  1.01   < >  1.15   < >  1.06   < >   --    A1C   --    --    --    --   6.4*   --   6.7*   < >   --     < > = values in this interval not displayed.        IMPRESSION:   {PREOP REASONS:696039::\"Reason for surgery/procedure: ***\",\"Diagnosis/reason for consult: ***\"}    The proposed surgical procedure is considered {HIGH=major cardiovascular or procedures requiring prolonged anesthesia >4 hours or large fluid shifts;    INTERMEDIATE=abdominal, most orthopedic and intrathoracic surgery; LOW= endoscopy, cataract and breast surgery:957783} risk.    REVISED CARDIAC RISK INDEX  The patient has the following serious cardiovascular risks for perioperative complications such as " "(MI, PE, VFib and 3  AV Block):  {PREOP REVISED CARDIAC INDEX (RCI):250286:p:\"No serious cardiac risks\"}  INTERPRETATION: {REVISED CARDIAC RISK INTERPRETATION:379858}    The patient has the following additional risks for perioperative complications:  {Additional perioperative risks:116999:p:\"No identified additional risks\"}      ICD-10-CM    1. Pre-op exam Z01.818 EKG 12-lead complete w/read - Clinics     Methicillin Resist/Sens S. aureus PCR   2. Preop general physical exam Z01.818        RECOMMENDATIONS:     {IMPORTANT - Conditions - complete carefully!!:021792}    {IMPORTANT - Medications:316893::\"--Patient is to take all scheduled medications on the day of surgery EXCEPT for modifications listed below.\"}    {IMPORTANT - Approval:541079:p:\"APPROVAL GIVEN to proceed with proposed procedure, without further diagnostic evaluation\"}       Signed Electronically by: Anh Spivey NP    Copy of this evaluation report is provided to requesting physician.    [place] Preop Guidelines    Revised Cardiac Risk Index  "

## 2018-11-27 ENCOUNTER — TELEPHONE (OUTPATIENT)
Dept: INTERNAL MEDICINE | Facility: CLINIC | Age: 73
End: 2018-11-27

## 2018-11-27 DIAGNOSIS — Z53.9 DIAGNOSIS NOT YET DEFINED: Primary | ICD-10-CM

## 2018-11-27 LAB
ANION GAP SERPL CALCULATED.3IONS-SCNC: 9 MMOL/L (ref 3–14)
BUN SERPL-MCNC: 16 MG/DL (ref 7–30)
CALCIUM SERPL-MCNC: 9.8 MG/DL (ref 8.5–10.1)
CHLORIDE SERPL-SCNC: 111 MMOL/L (ref 94–109)
CO2 SERPL-SCNC: 24 MMOL/L (ref 20–32)
CREAT SERPL-MCNC: 1 MG/DL (ref 0.66–1.25)
GFR SERPL CREATININE-BSD FRML MDRD: 73 ML/MIN/1.7M2
GLUCOSE SERPL-MCNC: 131 MG/DL (ref 70–99)
POTASSIUM SERPL-SCNC: 4.7 MMOL/L (ref 3.4–5.3)
SODIUM SERPL-SCNC: 144 MMOL/L (ref 133–144)

## 2018-11-27 PROCEDURE — G0180 MD CERTIFICATION HHA PATIENT: HCPCS | Performed by: INTERNAL MEDICINE

## 2018-11-27 RX ORDER — CEFAZOLIN SODIUM 2 G/100ML
2 INJECTION, SOLUTION INTRAVENOUS
Status: CANCELLED | OUTPATIENT
Start: 2018-11-27

## 2018-11-27 RX ORDER — CEFAZOLIN SODIUM 1 G/3ML
1 INJECTION, POWDER, FOR SOLUTION INTRAMUSCULAR; INTRAVENOUS SEE ADMIN INSTRUCTIONS
Status: CANCELLED | OUTPATIENT
Start: 2018-11-27

## 2018-11-27 NOTE — TELEPHONE ENCOUNTER
Fax received from [facility] for review and signature.  Put in Dr. Barrett's in basket.

## 2018-11-29 ENCOUNTER — MYC MEDICAL ADVICE (OUTPATIENT)
Dept: INTERNAL MEDICINE | Facility: CLINIC | Age: 73
End: 2018-11-29

## 2018-12-04 NOTE — H&P (VIEW-ONLY)
[place] [address]  [facility] [address]  [phone]  Dept: [phone]    PRE-OP EVALUATION:  Today's date: 2018    Lance Ibrahim (: 1945) presents for pre-operative evaluation assessment as requested by Dr. Cunha.  He requires evaluation and anesthesia risk assessment prior to undergoing surgery/procedure for treatment of Rt knee DJD .    Proposed Surgery/ Procedure: R knee replacement  Date of Surgery/ Procedure: 18  Time of Surgery/ Procedure: 730  Hospital/Surgical Facility: [facility]  Primary Physician: Anh Spivey  Type of Anesthesia Anticipated: General    Patient has a Health Care Directive or Living Will:  yes    1. YES - DO YOU HAVE A HISTORY OF HEART ATTACK, STROKE, STENT, BYPASS OR SURGERY ON AN ARTERY IN THE HEAD, NECK, HEART OR LEG?  H/o stroke  2. NO - Do you ever have any pain or discomfort in your chest?  3. NO - Do you have a history of  Heart Failure?  4. NO - Are you troubled by shortness of breath when: walking on the level, up a slight hill or at night?  5. NO - Do you currently have a cold, bronchitis or other respiratory infection?  6. NO - Do you have a cough, shortness of breath or wheezing?  7. NO - Do you sometimes get pains in the calves of your legs when you walk?  8. NO - Do you or anyone in your family have previous history of blood clots?  9. NO - Do you or does anyone in your family have a serious bleeding problem such as prolonged bleeding following surgeries or cuts?  10. NO - Have you ever had problems with anemia or been told to take iron pills?  11. NO - Have you had any abnormal blood loss such as black, tarry or bloody stools, or abnormal vaginal bleeding?  12. NO - Have you ever had a blood transfusion?  13. NO - Have you or any of your relatives ever had problems with anesthesia?  14. NO - Do you have sleep apnea, excessive snoring or daytime drowsiness?  15. NO - Do you have any prosthetic heart valves?  16.  NO - Do you have prosthetic joints?  17. NO - Is there any chance that you may be pregnant?      HPI:     HPI related to upcoming procedure:       See problem list for active medical problems.  Problems all longstanding and stable, except as noted/documented.  See ROS for pertinent symptoms related to these conditions.                                                                                                                                                          .    MEDICAL HISTORY:     Patient Active Problem List    Diagnosis Date Noted     Type 2 diabetes, HbA1c goal < 7% (H) 02/22/2013     Priority: High     HTN (hypertension) 07/06/2008     Priority: High     Transient cerebral ischemia 07/09/2004     Priority: High     Problem list name updated by automated process. Provider to review       Amputated toe, left (H) 11/08/2018     Priority: Medium     Type 2 diabetes mellitus with peripheral vascular disease (H) 11/08/2018     Priority: Medium     Diabetic foot infection (H) 10/14/2018     Priority: Medium     Cellulitis 06/19/2018     Priority: Medium     Sepsis (H) 05/01/2018     Priority: Medium     Cervicalgia 02/24/2017     Priority: Medium     Benign essential hypertension 02/17/2016     Priority: Medium     Obesity 10/14/2015     Priority: Medium     Diabetes mellitus type 2, uncomplicated (H) 10/14/2015     Priority: Medium     CARDIOVASCULAR SCREENING; LDL GOAL LESS THAN 100 10/31/2010     Priority: Medium     Hypertrophy of prostate with urinary obstruction 07/09/2004     Priority: Medium     Problem list name updated by automated process. Provider to review       Ventral hernia 06/28/2004     Priority: Medium     Problem list name updated by automated process. Provider to review       Gout 07/20/2012     Priority: Low     Chronic rhinitis 07/09/2004     Priority: Low      Past Medical History:   Diagnosis Date     Arthritis     osteoarthritis knees     Cerebral artery occlusion with cerebral  infarction (H)     TIA 1993, no residual     Chronic rhinitis      Diabetes mellitus (H)      HTN (hypertension)      Hypertrophy of prostate with urinary obstruction and other lower urinary tract symptoms (LUTS)      Sleep apnea     doesn't use cpap     TIA (transient ischaemic attack) 1993     Unspecified transient cerebral ischemia 1993    No definite source found     Past Surgical History:   Procedure Laterality Date     AMPUTATE TOE(S) Left 10/15/2018    Procedure: AMPUTATE TOE(S);  Left third toe amputation;  Surgeon: Ayaka Azevedo DPM, Podiatry/Foot and Ankle Surgery;  Location: [facility] OR     C NONSPECIFIC PROCEDURE  1985    Diastasis recti repair     C NONSPECIFIC PROCEDURE  1987    Ventral hernia repair     C NONSPECIFIC PROCEDURE      ORIF of left shoulder     COLONOSCOPY  3/9/2013    Procedure: COLONOSCOPY;  COLONOSCOPY;  Surgeon: Chau Hogan MD;  Location:  GI     EYE SURGERY  03/13/2017    left eye cataract     FOOT SURGERY  4/2013    cyst removal      HERNIA REPAIR  7/13/2004    ventral      Current Outpatient Prescriptions   Medication Sig Dispense Refill     Acetaminophen (TYLENOL PO) Take 650 mg by mouth every 6 hours as needed for mild pain or fever       aspirin 325 MG tablet Take 325 mg by mouth daily        blood glucose monitoring (ACCU-CHEK FASTCLIX) lancets 1 EACH BY IN VITRO ROUTE 2 TIMES DAILY 204 each 1     blood glucose monitoring (ACCU-CHEK SMARTVIEW) test strip USE TO TEST TWICE DAILY 200 strip 1     ferrous gluconate (FERGON) 324 (38 FE) MG tablet Take 1 tablet by mouth daily.       finasteride (PROSCAR) 5 MG tablet Take 5 mg by mouth daily.       fluticasone (FLONASE) 50 MCG/ACT spray Spray 1 spray into both nostrils 2 times daily       lisinopril (PRINIVIL/ZESTRIL) 10 MG tablet TAKE 2 TABLETS (20 MG) BY MOUTH DAILY  3     loratadine (CLARITIN) 10 MG tablet Take 10 mg by mouth At Bedtime       metFORMIN (GLUCOPHAGE) 1000 MG tablet TAKE 1 TABLET (1,000 MG) BY MOUTH 2 TIMES DAILY  "(WITH MEALS) **DUE FOR APRIL APPT/LABS. CALL MD** 180 tablet 1     Multiple Vitamins-Minerals (MULTIVITAMIN ADULTS PO) Take by mouth daily       tamsulosin (FLOMAX) 0.4 MG 24 hr capsule Take 1 capsule by mouth daily.       Wound Dressings ([facility] MAN[place]A HONEY WOUND) GEL Externally apply 1 mL topically daily 1 Tube 2     OTC products: None, except as noted above    Allergies   Allergen Reactions     Penicillins Anaphylaxis     \"anaphylactic\"     Sulfa Drugs      \"itchy rash, swelling of face and hands\"     Ancef [Cefazolin] Rash      Latex Allergy: NO    Social History   Substance Use Topics     Smoking status: Former Smoker     Quit date: 3/17/1993     Smokeless tobacco: Never Used     Alcohol use Yes      Comment: Occ, Once a month     History   Drug Use No       REVIEW OF SYSTEMS:   CONSTITUTIONAL: NEGATIVE for fever, chills, change in weight  ENT/MOUTH: NEGATIVE for ear, mouth and throat problems  RESP: NEGATIVE for significant cough or SOB  CV: NEGATIVE for chest pain, palpitations or peripheral edema  GI: NEGATIVE for nausea, abdominal pain, heartburn, or change in bowel habits  : NEGATIVE for frequency, dysuria, or hematuria  MUSCULOSKELETAL: NEGATIVE for significant arthralgias or myalgia  NEURO: NEGATIVE for weakness, dizziness or paresthesias  ENDOCRINE: NEGATIVE for temperature intolerance, skin/hair changes  HEME: NEGATIVE for bleeding problems    EXAM:   /78 (BP Location: Right arm, Patient Position: Sitting, Cuff Size: Adult Large)  Pulse 92  Temp 98.4  F (36.9  C) (Oral)  Resp 16  Ht 6' (1.829 m)  Wt 260 lb 4.8 oz (118.1 kg)  SpO2 96%  BMI 35.3 kg/m2    GENERAL APPEARANCE: alert, active and over weight     NECK: no adenopathy, no asymmetry, masses, or scars and thyroid normal to palpation     RESP: lungs clear to auscultation - no rales, rhonchi or wheezes     CV: regular rates and rhythm, normal S1 S2, no S3 or S4 and no murmur, click or rub     ABDOMEN:  soft, nontender, no HSM or masses " and bowel sounds normal     MS: extremities normal- no gross deformities noted, no evidence of inflammation in joints, FROM in all extremities.     SKIN: no suspicious lesions or rashes     NEURO: Normal strength and tone, sensory exam grossly normal, mentation intact and speech normal     PSYCH: mentation appears normal. and affect normal/bright    DIAGNOSTICS:   EKG: appears normal, NSR    Recent Labs   Lab Test  11/05/18   0944 10/30/18  10/22/18   0628   10/17/18   0709   05/01/18   1951   12/24/12   0804   HGB   --   11.0*  11.1*   < >   --    < >   --    < >  13.2*   PLT   --   535*  488*   < >   --    < >  193   < >  227   INR   --    --    --    --    --    --    --    --   1.05   NA  139  137  133   < >   --    < >   --    < >   --    POTASSIUM  4.7  5.2*  4.9   < >   --    < >   --    < >   --    CR  1.39*  1.08  1.01   < >  1.15   < >  1.06   < >   --    A1C   --    --    --    --   6.4*   --   6.7*   < >   --     < > = values in this interval not displayed.        IMPRESSION:   Reason for surgery/procedure: R Knee DJD    The proposed surgical procedure is considered INTERMEDIATE risk.    REVISED CARDIAC RISK INDEX  The patient has the following serious cardiovascular risks for perioperative complications such as (MI, PE, VFib and 3  AV Block):  Cerebrovascular Disease (TIA or CVA)  INTERPRETATION: 1 risks: Class II (low risk - 0.9% complication rate)    The patient has the following additional risks for perioperative complications:  No identified additional risks      ICD-10-CM    1. Pre-op exam Z01.818 EKG 12-lead complete w/read - Clinics     Methicillin Resist/Sens S. aureus PCR     CBC with platelets     Basic metabolic panel     UA reflex to Microscopic and Culture   2. Preop general physical exam Z01.818      (Z01.818) Pre-op exam  (primary encounter diagnosis)  Comment:   Plan: EKG 12-lead complete w/read - Clinics,         Methicillin Resist/Sens S. aureus PCR, CBC with        platelets, Basic  metabolic panel, UA reflex to         Microscopic and Culture, Urine Microscopic              RECOMMENDATIONS:     --Consult hospital rounder / IM to assist post-op medical management    --Patient is to take all scheduled medications on the day of surgery EXCEPT for modifications listed below.    APPROVAL GIVEN to proceed with proposed procedure, without further diagnostic evaluation       Signed Electronically by: Anh Spivey NP    Copy of this evaluation report is provided to requesting physician.    [facility] Preop Guidelines    Revised Cardiac Risk Index

## 2018-12-05 NOTE — PHARMACY-ADMISSION MEDICATION HISTORY
Admission medication history interview status for this patient is complete per pre-admitting RN. See [facility] admission navigator for allergy information, prior to admission medications and immunization status.         Prior to Admission medications    Medication Sig Last Dose Taking? Auth Provider   Acetaminophen (TYLENOL PO) Take 650 mg by mouth every 6 hours as needed for mild pain or fever  Yes Reported, Patient   aspirin 325 MG tablet Take 325 mg by mouth daily   Yes Reported, Patient   ferrous gluconate (FERGON) 324 (38 FE) MG tablet Take 1 tablet by mouth daily.  Yes Reported, Patient   finasteride (PROSCAR) 5 MG tablet Take 5 mg by mouth daily.  Yes Reported, Patient   fluticasone (FLONASE) 50 MCG/ACT spray Spray 1 spray into both nostrils 2 times daily  Yes Unknown, Entered By History   lisinopril (PRINIVIL/ZESTRIL) 10 MG tablet TAKE 2 TABLETS (20 MG) BY MOUTH DAILY  Yes Reported, Patient   loratadine (CLARITIN) 10 MG tablet Take 10 mg by mouth At Bedtime  Yes Reported, Patient   metFORMIN (GLUCOPHAGE) 1000 MG tablet TAKE 1 TABLET (1,000 MG) BY MOUTH 2 TIMES DAILY (WITH MEALS) **DUE FOR APRIL APPT/LABS. CALL MD**  Yes Anh Spivey NP   Multiple Vitamins-Minerals (MULTIVITAMIN ADULTS PO) Take by mouth daily  Yes Reported, Patient   tamsulosin (FLOMAX) 0.4 MG 24 hr capsule Take 1 capsule by mouth daily.  Yes Reported, Patient   blood glucose monitoring (ACCU-CHEK FASTCLIX) lancets 1 EACH BY IN VITRO ROUTE 2 TIMES DAILY   Anh pSivey NP   blood glucose monitoring (ACCU-CHEK SMARTVIEW) test strip USE TO TEST TWICE DAILY   Temo Zendejas MD   Wound Dressings ([facility] MANUKA HONEY WOUND) GEL Externally apply 1 mL topically daily   Ayaka Azevedo DPM, Podiatry/Foot and Ankle Surgery

## 2018-12-06 ENCOUNTER — ANESTHESIA EVENT (OUTPATIENT)
Dept: SURGERY | Facility: CLINIC | Age: 73
DRG: 470 | End: 2018-12-06
Payer: COMMERCIAL

## 2018-12-07 ENCOUNTER — HOSPITAL ENCOUNTER (INPATIENT)
Facility: CLINIC | Age: 73
LOS: 4 days | Discharge: NURSING, REHAB OR LONG-TERM CARE | DRG: 470 | End: 2018-12-11
Attending: ORTHOPAEDIC SURGERY | Admitting: ORTHOPAEDIC SURGERY
Payer: COMMERCIAL

## 2018-12-07 ENCOUNTER — APPOINTMENT (OUTPATIENT)
Dept: PHYSICAL THERAPY | Facility: CLINIC | Age: 73
DRG: 470 | End: 2018-12-07
Attending: ORTHOPAEDIC SURGERY
Payer: COMMERCIAL

## 2018-12-07 ENCOUNTER — APPOINTMENT (OUTPATIENT)
Dept: GENERAL RADIOLOGY | Facility: CLINIC | Age: 73
DRG: 470 | End: 2018-12-07
Attending: ORTHOPAEDIC SURGERY
Payer: COMMERCIAL

## 2018-12-07 ENCOUNTER — ANESTHESIA (OUTPATIENT)
Dept: SURGERY | Facility: CLINIC | Age: 73
DRG: 470 | End: 2018-12-07
Payer: COMMERCIAL

## 2018-12-07 DIAGNOSIS — Z96.651 STATUS POST TOTAL RIGHT KNEE REPLACEMENT: Primary | ICD-10-CM

## 2018-12-07 PROBLEM — Z96.659 TOTAL KNEE REPLACEMENT STATUS: Status: ACTIVE | Noted: 2018-12-07

## 2018-12-07 LAB
GLUCOSE BLDC GLUCOMTR-MCNC: 103 MG/DL (ref 70–99)
GLUCOSE BLDC GLUCOMTR-MCNC: 105 MG/DL (ref 70–99)
GLUCOSE BLDC GLUCOMTR-MCNC: 118 MG/DL (ref 70–99)
GLUCOSE BLDC GLUCOMTR-MCNC: 134 MG/DL (ref 70–99)
GLUCOSE BLDC GLUCOMTR-MCNC: 136 MG/DL (ref 70–99)

## 2018-12-07 PROCEDURE — 27810169 ZZH OR IMPLANT GENERAL: Performed by: ORTHOPAEDIC SURGERY

## 2018-12-07 PROCEDURE — 37000008 ZZH ANESTHESIA TECHNICAL FEE, 1ST 30 MIN: Performed by: ORTHOPAEDIC SURGERY

## 2018-12-07 PROCEDURE — 71000012 ZZH RECOVERY PHASE 1 LEVEL 1 FIRST HR: Performed by: ORTHOPAEDIC SURGERY

## 2018-12-07 PROCEDURE — 36000093 ZZH SURGERY LEVEL 4 1ST 30 MIN: Performed by: ORTHOPAEDIC SURGERY

## 2018-12-07 PROCEDURE — 40000193 ZZH STATISTIC PT WARD VISIT: Performed by: PHYSICAL THERAPIST

## 2018-12-07 PROCEDURE — 27210794 ZZH OR GENERAL SUPPLY STERILE: Performed by: ORTHOPAEDIC SURGERY

## 2018-12-07 PROCEDURE — 40000986 XR KNEE PORT RT 1/2 VW: Mod: RT

## 2018-12-07 PROCEDURE — 25000132 ZZH RX MED GY IP 250 OP 250 PS 637: Performed by: ORTHOPAEDIC SURGERY

## 2018-12-07 PROCEDURE — 25000125 ZZHC RX 250: Performed by: ORTHOPAEDIC SURGERY

## 2018-12-07 PROCEDURE — C1776 JOINT DEVICE (IMPLANTABLE): HCPCS | Performed by: ORTHOPAEDIC SURGERY

## 2018-12-07 PROCEDURE — 00000146 ZZHCL STATISTIC GLUCOSE BY METER IP

## 2018-12-07 PROCEDURE — 25000128 H RX IP 250 OP 636: Performed by: ANESTHESIOLOGY

## 2018-12-07 PROCEDURE — 97110 THERAPEUTIC EXERCISES: CPT | Mod: GP | Performed by: PHYSICAL THERAPIST

## 2018-12-07 PROCEDURE — 37000009 ZZH ANESTHESIA TECHNICAL FEE, EACH ADDTL 15 MIN: Performed by: ORTHOPAEDIC SURGERY

## 2018-12-07 PROCEDURE — 40000306 ZZH STATISTIC PRE PROC ASSESS II: Performed by: ORTHOPAEDIC SURGERY

## 2018-12-07 PROCEDURE — 12000000 ZZH R&B MED SURG/OB

## 2018-12-07 PROCEDURE — 25000125 ZZHC RX 250: Performed by: NURSE ANESTHETIST, CERTIFIED REGISTERED

## 2018-12-07 PROCEDURE — 0SRC0J9 REPLACEMENT OF RIGHT KNEE JOINT WITH SYNTHETIC SUBSTITUTE, CEMENTED, OPEN APPROACH: ICD-10-PCS | Performed by: ORTHOPAEDIC SURGERY

## 2018-12-07 PROCEDURE — 25000128 H RX IP 250 OP 636: Performed by: ORTHOPAEDIC SURGERY

## 2018-12-07 PROCEDURE — 97161 PT EVAL LOW COMPLEX 20 MIN: CPT | Mod: GP | Performed by: PHYSICAL THERAPIST

## 2018-12-07 PROCEDURE — 97530 THERAPEUTIC ACTIVITIES: CPT | Mod: GP | Performed by: PHYSICAL THERAPIST

## 2018-12-07 PROCEDURE — 25800025 ZZH RX 258: Performed by: ORTHOPAEDIC SURGERY

## 2018-12-07 PROCEDURE — 36000063 ZZH SURGERY LEVEL 4 EA 15 ADDTL MIN: Performed by: ORTHOPAEDIC SURGERY

## 2018-12-07 DEVICE — IMPLANTABLE DEVICE: Type: IMPLANTABLE DEVICE | Site: KNEE | Status: FUNCTIONAL

## 2018-12-07 DEVICE — BONE CEMENT STRK SIMPLEX P SPEEDSET 6192-1-001: Type: IMPLANTABLE DEVICE | Site: KNEE | Status: FUNCTIONAL

## 2018-12-07 RX ORDER — FINASTERIDE 5 MG/1
5 TABLET, FILM COATED ORAL DAILY
Status: DISCONTINUED | OUTPATIENT
Start: 2018-12-07 | End: 2018-12-11 | Stop reason: HOSPADM

## 2018-12-07 RX ORDER — ALBUTEROL SULFATE 0.83 MG/ML
2.5 SOLUTION RESPIRATORY (INHALATION) EVERY 4 HOURS PRN
Status: DISCONTINUED | OUTPATIENT
Start: 2018-12-07 | End: 2018-12-07 | Stop reason: HOSPADM

## 2018-12-07 RX ORDER — METOPROLOL TARTRATE 1 MG/ML
1-2 INJECTION, SOLUTION INTRAVENOUS EVERY 5 MIN PRN
Status: DISCONTINUED | OUTPATIENT
Start: 2018-12-07 | End: 2018-12-07 | Stop reason: HOSPADM

## 2018-12-07 RX ORDER — ONDANSETRON 2 MG/ML
4 INJECTION INTRAMUSCULAR; INTRAVENOUS EVERY 6 HOURS PRN
Status: DISCONTINUED | OUTPATIENT
Start: 2018-12-07 | End: 2018-12-11 | Stop reason: HOSPADM

## 2018-12-07 RX ORDER — NALOXONE HYDROCHLORIDE 0.4 MG/ML
.1-.4 INJECTION, SOLUTION INTRAMUSCULAR; INTRAVENOUS; SUBCUTANEOUS
Status: DISCONTINUED | OUTPATIENT
Start: 2018-12-07 | End: 2018-12-07 | Stop reason: HOSPADM

## 2018-12-07 RX ORDER — ASPIRIN 325 MG
325 TABLET ORAL DAILY
Status: DISCONTINUED | OUTPATIENT
Start: 2018-12-07 | End: 2018-12-07

## 2018-12-07 RX ORDER — AMOXICILLIN 250 MG
1 CAPSULE ORAL 2 TIMES DAILY
Status: DISCONTINUED | OUTPATIENT
Start: 2018-12-07 | End: 2018-12-11 | Stop reason: HOSPADM

## 2018-12-07 RX ORDER — ACETAMINOPHEN 325 MG/1
650 TABLET ORAL EVERY 4 HOURS PRN
Status: DISCONTINUED | OUTPATIENT
Start: 2018-12-10 | End: 2018-12-11 | Stop reason: HOSPADM

## 2018-12-07 RX ORDER — FERROUS GLUCONATE 324(38)MG
324 TABLET ORAL DAILY
Status: DISCONTINUED | OUTPATIENT
Start: 2018-12-07 | End: 2018-12-11 | Stop reason: HOSPADM

## 2018-12-07 RX ORDER — LIDOCAINE 40 MG/G
CREAM TOPICAL
Status: DISCONTINUED | OUTPATIENT
Start: 2018-12-07 | End: 2018-12-11 | Stop reason: HOSPADM

## 2018-12-07 RX ORDER — FENTANYL CITRATE 50 UG/ML
25-50 INJECTION, SOLUTION INTRAMUSCULAR; INTRAVENOUS
Status: DISCONTINUED | OUTPATIENT
Start: 2018-12-07 | End: 2018-12-07 | Stop reason: HOSPADM

## 2018-12-07 RX ORDER — FLUTICASONE PROPIONATE 50 MCG
1 SPRAY, SUSPENSION (ML) NASAL 2 TIMES DAILY
Status: DISCONTINUED | OUTPATIENT
Start: 2018-12-07 | End: 2018-12-11 | Stop reason: HOSPADM

## 2018-12-07 RX ORDER — ACETAMINOPHEN 325 MG/1
975 TABLET ORAL EVERY 8 HOURS
Status: COMPLETED | OUTPATIENT
Start: 2018-12-07 | End: 2018-12-10

## 2018-12-07 RX ORDER — ONDANSETRON 4 MG/1
4 TABLET, ORALLY DISINTEGRATING ORAL EVERY 6 HOURS PRN
Status: DISCONTINUED | OUTPATIENT
Start: 2018-12-07 | End: 2018-12-11 | Stop reason: HOSPADM

## 2018-12-07 RX ORDER — SODIUM CHLORIDE, SODIUM LACTATE, POTASSIUM CHLORIDE, CALCIUM CHLORIDE 600; 310; 30; 20 MG/100ML; MG/100ML; MG/100ML; MG/100ML
INJECTION, SOLUTION INTRAVENOUS CONTINUOUS
Status: DISCONTINUED | OUTPATIENT
Start: 2018-12-07 | End: 2018-12-07 | Stop reason: HOSPADM

## 2018-12-07 RX ORDER — LIDOCAINE 40 MG/G
CREAM TOPICAL
Status: DISCONTINUED | OUTPATIENT
Start: 2018-12-07 | End: 2018-12-07 | Stop reason: HOSPADM

## 2018-12-07 RX ORDER — ONDANSETRON 4 MG/1
4 TABLET, ORALLY DISINTEGRATING ORAL EVERY 30 MIN PRN
Status: DISCONTINUED | OUTPATIENT
Start: 2018-12-07 | End: 2018-12-07 | Stop reason: HOSPADM

## 2018-12-07 RX ORDER — AMOXICILLIN 250 MG
2 CAPSULE ORAL 2 TIMES DAILY
Status: DISCONTINUED | OUTPATIENT
Start: 2018-12-07 | End: 2018-12-11 | Stop reason: HOSPADM

## 2018-12-07 RX ORDER — CLINDAMYCIN PHOSPHATE 900 MG/50ML
900 INJECTION, SOLUTION INTRAVENOUS EVERY 8 HOURS
Status: COMPLETED | OUTPATIENT
Start: 2018-12-07 | End: 2018-12-07

## 2018-12-07 RX ORDER — KETOROLAC TROMETHAMINE 15 MG/ML
15 INJECTION, SOLUTION INTRAMUSCULAR; INTRAVENOUS EVERY 6 HOURS
Status: COMPLETED | OUTPATIENT
Start: 2018-12-07 | End: 2018-12-08

## 2018-12-07 RX ORDER — PROPOFOL 10 MG/ML
INJECTION, EMULSION INTRAVENOUS CONTINUOUS PRN
Status: DISCONTINUED | OUTPATIENT
Start: 2018-12-07 | End: 2018-12-07

## 2018-12-07 RX ORDER — DIMENHYDRINATE 50 MG/ML
25 INJECTION, SOLUTION INTRAMUSCULAR; INTRAVENOUS
Status: DISCONTINUED | OUTPATIENT
Start: 2018-12-07 | End: 2018-12-07 | Stop reason: HOSPADM

## 2018-12-07 RX ORDER — NALOXONE HYDROCHLORIDE 0.4 MG/ML
.1-.4 INJECTION, SOLUTION INTRAMUSCULAR; INTRAVENOUS; SUBCUTANEOUS
Status: DISCONTINUED | OUTPATIENT
Start: 2018-12-07 | End: 2018-12-11 | Stop reason: HOSPADM

## 2018-12-07 RX ORDER — TAMSULOSIN HYDROCHLORIDE 0.4 MG/1
0.4 CAPSULE ORAL DAILY
Status: DISCONTINUED | OUTPATIENT
Start: 2018-12-07 | End: 2018-12-11 | Stop reason: HOSPADM

## 2018-12-07 RX ORDER — HYDROMORPHONE HYDROCHLORIDE 1 MG/ML
.3-.5 INJECTION, SOLUTION INTRAMUSCULAR; INTRAVENOUS; SUBCUTANEOUS EVERY 10 MIN PRN
Status: DISCONTINUED | OUTPATIENT
Start: 2018-12-07 | End: 2018-12-07 | Stop reason: HOSPADM

## 2018-12-07 RX ORDER — MEPERIDINE HYDROCHLORIDE 50 MG/ML
12.5 INJECTION INTRAMUSCULAR; INTRAVENOUS; SUBCUTANEOUS
Status: DISCONTINUED | OUTPATIENT
Start: 2018-12-07 | End: 2018-12-07 | Stop reason: HOSPADM

## 2018-12-07 RX ORDER — SODIUM CHLORIDE 9 MG/ML
INJECTION, SOLUTION INTRAVENOUS CONTINUOUS
Status: DISCONTINUED | OUTPATIENT
Start: 2018-12-07 | End: 2018-12-10

## 2018-12-07 RX ORDER — LORATADINE 10 MG/1
10 TABLET ORAL AT BEDTIME
Status: DISCONTINUED | OUTPATIENT
Start: 2018-12-07 | End: 2018-12-11 | Stop reason: HOSPADM

## 2018-12-07 RX ORDER — HYDROMORPHONE HYDROCHLORIDE 1 MG/ML
.3-.5 INJECTION, SOLUTION INTRAMUSCULAR; INTRAVENOUS; SUBCUTANEOUS
Status: DISCONTINUED | OUTPATIENT
Start: 2018-12-07 | End: 2018-12-11 | Stop reason: HOSPADM

## 2018-12-07 RX ORDER — LISINOPRIL 10 MG/1
20 TABLET ORAL DAILY
Status: DISCONTINUED | OUTPATIENT
Start: 2018-12-07 | End: 2018-12-11 | Stop reason: HOSPADM

## 2018-12-07 RX ORDER — ONDANSETRON 2 MG/ML
4 INJECTION INTRAMUSCULAR; INTRAVENOUS EVERY 30 MIN PRN
Status: DISCONTINUED | OUTPATIENT
Start: 2018-12-07 | End: 2018-12-07 | Stop reason: HOSPADM

## 2018-12-07 RX ORDER — OXYCODONE HYDROCHLORIDE 5 MG/1
5-10 TABLET ORAL EVERY 4 HOURS PRN
Status: DISCONTINUED | OUTPATIENT
Start: 2018-12-07 | End: 2018-12-11 | Stop reason: HOSPADM

## 2018-12-07 RX ADMIN — TAMSULOSIN HYDROCHLORIDE 0.4 MG: 0.4 CAPSULE ORAL at 12:44

## 2018-12-07 RX ADMIN — ACETAMINOPHEN 975 MG: 325 TABLET, FILM COATED ORAL at 17:51

## 2018-12-07 RX ADMIN — LORATADINE 10 MG: 10 TABLET ORAL at 21:29

## 2018-12-07 RX ADMIN — FINASTERIDE 5 MG: 5 TABLET, FILM COATED ORAL at 12:49

## 2018-12-07 RX ADMIN — CLINDAMYCIN IN 5 PERCENT DEXTROSE 900 MG: 18 INJECTION, SOLUTION INTRAVENOUS at 14:06

## 2018-12-07 RX ADMIN — VANCOMYCIN HYDROCHLORIDE 1500 MG: 10 INJECTION, POWDER, LYOPHILIZED, FOR SOLUTION INTRAVENOUS at 06:23

## 2018-12-07 RX ADMIN — SODIUM CHLORIDE: 9 INJECTION, SOLUTION INTRAVENOUS at 13:11

## 2018-12-07 RX ADMIN — SODIUM CHLORIDE, POTASSIUM CHLORIDE, SODIUM LACTATE AND CALCIUM CHLORIDE: 600; 310; 30; 20 INJECTION, SOLUTION INTRAVENOUS at 07:35

## 2018-12-07 RX ADMIN — MIDAZOLAM 2 MG: 1 INJECTION INTRAMUSCULAR; INTRAVENOUS at 07:15

## 2018-12-07 RX ADMIN — FLUTICASONE PROPIONATE 1 SPRAY: 50 SPRAY, METERED NASAL at 12:50

## 2018-12-07 RX ADMIN — OXYCODONE HYDROCHLORIDE 10 MG: 5 TABLET ORAL at 21:34

## 2018-12-07 RX ADMIN — CLINDAMYCIN IN 5 PERCENT DEXTROSE 900 MG: 18 INJECTION, SOLUTION INTRAVENOUS at 21:39

## 2018-12-07 RX ADMIN — METFORMIN HYDROCHLORIDE 1000 MG: 500 TABLET ORAL at 17:51

## 2018-12-07 RX ADMIN — OXYCODONE HYDROCHLORIDE 10 MG: 5 TABLET ORAL at 14:10

## 2018-12-07 RX ADMIN — ASPIRIN 325 MG: 325 TABLET, DELAYED RELEASE ORAL at 12:45

## 2018-12-07 RX ADMIN — SODIUM CHLORIDE, POTASSIUM CHLORIDE, SODIUM LACTATE AND CALCIUM CHLORIDE: 600; 310; 30; 20 INJECTION, SOLUTION INTRAVENOUS at 08:40

## 2018-12-07 RX ADMIN — PROPOFOL 50 MCG/KG/MIN: 10 INJECTION, EMULSION INTRAVENOUS at 07:39

## 2018-12-07 RX ADMIN — LISINOPRIL 20 MG: 10 TABLET ORAL at 14:06

## 2018-12-07 RX ADMIN — ACETAMINOPHEN 975 MG: 325 TABLET, FILM COATED ORAL at 12:44

## 2018-12-07 RX ADMIN — FERROUS GLUCONATE 324 MG: 324 TABLET ORAL at 12:45

## 2018-12-07 RX ADMIN — KETOROLAC TROMETHAMINE 15 MG: 15 INJECTION, SOLUTION INTRAMUSCULAR; INTRAVENOUS at 16:16

## 2018-12-07 RX ADMIN — KETOROLAC TROMETHAMINE 15 MG: 15 INJECTION, SOLUTION INTRAMUSCULAR; INTRAVENOUS at 09:40

## 2018-12-07 RX ADMIN — FLUTICASONE PROPIONATE 1 SPRAY: 50 SPRAY, METERED NASAL at 19:42

## 2018-12-07 RX ADMIN — KETOROLAC TROMETHAMINE 15 MG: 15 INJECTION, SOLUTION INTRAMUSCULAR; INTRAVENOUS at 21:29

## 2018-12-07 ASSESSMENT — ACTIVITIES OF DAILY LIVING (ADL)
ADLS_ACUITY_SCORE: 13

## 2018-12-07 ASSESSMENT — LIFESTYLE VARIABLES: TOBACCO_USE: 1

## 2018-12-07 NOTE — PROGRESS NOTES
12/07/18 1518   Quick Adds   Type of Visit Initial PT Evaluation  (Simultaneous filing. User may not have seen previous data.)   Living Environment   Lives With spouse  (Simultaneous filing. User may not have seen previous data.)   Living Arrangements house  (Simultaneous filing. User may not have seen previous data.)   Home Accessibility stairs to enter home;stairs within home;bed and bath are not on the first floor  (Simultaneous filing. User may not have seen previous data.)   Number of Stairs to Enter Home 5  (Simultaneous filing. User may not have seen previous data.)   Number of Stairs Within Home 15  (Simultaneous filing. User may not have seen previous data.)   Stair Railings at Home inside, present at both sides;outside, present on right side  (Simultaneous filing. User may not have seen previous data.)   Transportation Available car;other (see comments)  (scheduling a medical cab  Simultaneous filing. 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 Patient plans to d/c to [facility].  (Simultaneous filing. User may not have seen previous data.)   Self-Care   Usual Activity Tolerance moderate  (Simultaneous filing. User may not have seen previous data.)   Current Activity Tolerance moderate  (Simultaneous filing. User may not have seen previous data.)   Regular Exercise no  (Simultaneous filing. User may not have seen previous data.)   Equipment Currently Used at Home wheelchair, manual;walker, standard;wheelchair, power;crutches;orthotic device  (Simultaneous filing. User may not have seen previous data.)   Activity/Exercise/Self-Care Comment Patient ambulates without an AD at baseline   (Simultaneous filing. User may not have seen previous data.)   Functional Level Prior   Ambulation 0-->independent  (Simultaneous filing. User may not have seen previous data.)   Transferring 0-->independent  (Simultaneous  filing. User may not have seen previous data.)   Toileting 0-->independent  (Simultaneous filing. User may not have seen previous data.)   Bathing 0-->independent  (Simultaneous filing. User may not have seen previous data.)   Dressing 0-->independent  (Simultaneous filing. User may not have seen previous data.)   Eating 0-->independent  (Simultaneous filing. User may not have seen previous data.)   Communication 0-->understands/communicates without difficulty  (Simultaneous filing. User may not have seen previous data.)   Swallowing 0-->swallows foods/liquids without difficulty  (Simultaneous filing. User may not have seen previous data.)   Cognition 0 - no cognition issues reported  (Simultaneous filing. User may not have seen previous data.)   Fall history within last six months no  (Reports he has fallen in the past year  Simultaneous filing. User may not have seen previous data.)   Which of the above functional risks had a recent onset or change? ambulation;transferring;toileting;bathing;dressing  (Simultaneous filing. User may not have seen previous data.)   Prior Functional Level Comment Patient independent with all functional mobility tasks at baseline.  (Simultaneous filing. User may not have seen previous data.)   General Information   Onset of Illness/Injury or Date of Surgery - Date 12/07/18  (Simultaneous filing. User may not have seen previous data.)   Referring Physician Issa Cunha MD  (Simultaneous filing. User may not have seen previous data.)   Patient/Family Goals Statement discharge to TCU  (Simultaneous filing. User may not have seen previous data.)   Pertinent History of Current Problem (include personal factors and/or comorbidities that impact the POC) Patient is a 74 y/o male s/p R TKA, POD#0. Refer to EMR for pertinent PMH  (Simultaneous filing. User may not have seen previous data.)   Precautions/Limitations fall precautions  (Simultaneous filing. User may not have seen previous  data.)   Weight-Bearing Status - RLE weight-bearing as tolerated  (Simultaneous filing. User may not have seen previous data.)   Cognitive Status Examination   Orientation orientation to person, place and time  (Simultaneous filing. User may not have seen previous data.)   Level of Consciousness alert  (Simultaneous filing. User may not have seen previous data.)   Follows Commands and Answers Questions 100% of the time  (Simultaneous filing. User may not have seen previous data.)   Personal Safety and Judgment intact  (Simultaneous filing. User may not have seen previous data.)   Memory intact  (Simultaneous filing. User may not have seen previous data.)   Pain Assessment   Patient Currently in Pain Yes, see Vital Sign flowsheet  (Simultaneous filing. User may not have seen previous data.)   Integumentary/Edema   Integumentary/Edema Comments ACE bandages clean, dry, intact   (Simultaneous filing. User may not have seen previous data.)   Posture    Posture Forward head position;Protracted shoulders  (Simultaneous filing. User may not have seen previous data.)   Range of Motion (ROM)   ROM Comment R LE impaired secondary to surgery  (Simultaneous filing. User may not have seen previous data.)   Strength   Strength Comments Unable to independently SLR on R LE  (Simultaneous filing. User may not have seen previous data.)   Bed Mobility   Bed Mobility Comments Supine <> sit with SBA  (Simultaneous filing. User may not have seen previous data.)   Transfer Skills   Transfer Comments Sit <> stand with FWW and Min A/CGA x  (Simultaneous filing. User may not have seen previous data.)   Gait   Gait Comments N/A at this time secondary to buckling on R LE  (Simultaneous filing. User may not have seen previous data.)   Balance   Balance Comments Requires UE support on FWW for safe dynamic mobility  (Simultaneous filing. User may not have seen previous data.)   Sensory Examination   Sensory Perception no deficits were  identified  (Simultaneous filing. User may not have seen previous data.)   Coordination   Coordination no deficits were identified  (Simultaneous filing. User may not have seen previous data.)   Muscle Tone   Muscle Tone no deficits were identified  (Simultaneous filing. User may not have seen previous data.)   Modality Interventions   Planned Modality Interventions Cryotherapy  (Simultaneous filing. User may not have seen previous data.)   Planned Modality Interventions Comments Ice PRN  (Simultaneous filing. User may not have seen previous data.)   General Therapy Interventions   Planned Therapy Interventions bed mobility training;gait training;ROM;strengthening;transfer training;progressive activity/exercise;home program guidelines  (Simultaneous filing. User may not have seen previous data.)   Clinical Impression   Criteria for Skilled Therapeutic Intervention yes, treatment indicated  (Simultaneous filing. User may not have seen previous data.)   PT Diagnosis decreased independence with functional mobility   (Simultaneous filing. User may not have seen previous data.)   Influenced by the following impairments pain, weakness, decreased ROM on R LE  (Simultaneous filing. User may not have seen previous data.)   Functional limitations due to impairments ambulation, stair negotiation, transfers   (Simultaneous filing. User may not have seen previous data.)   Clinical Presentation Stable/Uncomplicated  (Simultaneous filing. User may not have seen previous data.)   Clinical Presentation Rationale Patient presenting as expected s/p R TKA  (Simultaneous filing. User may not have seen previous data.)   Clinical Decision Making (Complexity) Low complexity  (Simultaneous filing. User may not have seen previous data.)   Therapy Frequency` 2 times/day  (Simultaneous filing. User may not have seen previous data.)   Predicted Duration of Therapy Intervention (days/wks) 3 days  (Simultaneous filing. User may not have seen  "previous data.)   Anticipated Discharge Disposition Transitional Care Facility  (Simultaneous filing. User may not have seen previous data.)   Risk & Benefits of therapy have been explained Yes  (Simultaneous filing. User may not have seen previous data.)   Patient, Family & other staff in agreement with plan of care Yes  (Simultaneous filing. User may not have seen previous data.)   [facility] AM-PAC TM \"6 Clicks\"   2016, Trustees of [facility], under license to [employer].  All rights reserved.   6 Clicks Short Forms Basic Mobility Inpatient Short Form  (Simultaneous filing. User may not have seen previous data.)   [facility] AM-PAC  \"6 Clicks\" V.2 Basic Mobility Inpatient Short Form   1. Turning from your back to your side while in a flat bed without using bedrails? 4 - None  (Simultaneous filing. User may not have seen previous data.)   2. Moving from lying on your back to sitting on the side of a flat bed without using bedrails? 4 - None  (Simultaneous filing. User may not have seen previous data.)   3. Moving to and from a bed to a chair (including a wheelchair)? 2 - A Lot  (Simultaneous filing. User may not have seen previous data.)   4. Standing up from a chair using your arms (e.g., wheelchair, or bedside chair)? 2 - A Lot  (Simultaneous filing. User may not have seen previous data.)   5. To walk in hospital room? 2 - A Lot  (Simultaneous filing. User may not have seen previous data.)   6. Climbing 3-5 steps with a railing? 1 - Total  (Simultaneous filing. User may not have seen previous data.)   Basic Mobility Raw Score (Score out of 24.Lower scores equate to lower levels of function) 15   Total Evaluation Time   Total Evaluation Time (Minutes) 5  (Simultaneous filing. User may not have seen previous data.)     "

## 2018-12-07 NOTE — BRIEF OP NOTE
[facility] Brief Operative Note    Pre-operative diagnosis: DJD   Post-operative diagnosis * No post-op diagnosis entered *     Procedure: Procedure(s):  ARTHROPLASTY KNEE right   Surgeon(s): Surgeon(s) and Role:     * Issa Cunha MD - Primary   Estimated blood loss: * No values recorded between 12/7/2018  7:49 AM and 12/7/2018  9:04 AM *    Specimens: * No specimens in log *   Findings:

## 2018-12-07 NOTE — ANESTHESIA PROCEDURE NOTES
Peripheral nerve/Neuraxial procedure note : Femoral  Pre-Procedure  Performed by INDRA RIVAS  Referred by FORREST BAUTISTA MD  Location: pre-op    Procedure Times:12/7/2018 7:16 AM and 12/7/2018 7:23 AM  Pre-Anesthestic Checklist: patient identified, IV checked, site marked, risks and benefits discussed, informed consent, monitors and equipment checked, pre-op evaluation, at physician/surgeon's request and post-op pain management    Timeout  Correct Patient: Yes   Correct Procedure: Yes   Correct Site: Yes   Correct Laterality: Yes   Correct Position: Yes   Site Marked: Yes   .   Procedure Documentation    .    Procedure:  right  Femoral.     Ultrasound used to identify targeted nerve, plexus, or vascular marker and placed a needle adjacent to it., Ultrasound was used to visualize the spread of the anesthetic in close proximity to the above stated nerve.   Patient Prep;mask, sterile gloves, povidone-iodine 7.5% surgical scrub.  Nerve Stim: Initial Level 1 mA. Lowest motor response mA..  Needle: insulated  Needle Length (Inches) 3.13  Insertion Method: Single Shot.       Assessment/Narrative  .  The placement was negative for: blood aspirated, painful injection and site bleeding.  Bolus given via needle..   Secured via.   Complications: none. Comments:  .The surgeon has given a verbal order transferring care of this patient to me for the performance of a regional analgesia block for post-op pain control. It is requested of me because I am uniquely trained and qualified to perform this block and the surgeon is neither trained nor qualified to perform this procedure.  .Femoral Nerve Block    Medication- Bupivicaine 0.75% 24cc + Lido 2% w/ epi 1:200k 6cc  KRISTI Rivas

## 2018-12-07 NOTE — PLAN OF CARE
Problem: Patient Care Overview  Goal: Plan of Care/Patient Progress Review  Outcome: Improving  Arrived to unit at 1015.  No sensation from groin down upon arrival.  2+ Pedal Pulses.  3+ edema to LLE and 2+ edema to RLE.  Pre-existing scab to medial left ankle.  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  Incision site appears black.  [facility] nurse notified.  Tolerating CHO diet.  Voiced frustration with capnography alarms and inability to rest.  Patient now has sensation to bilat LE's and is able to wiggle toes.  Oxycodone 10 mg for pain. Patient would like to go to TCU at discharge.

## 2018-12-07 NOTE — ANESTHESIA PROCEDURE NOTES
Peripheral nerve/Neuraxial procedure note : intrathecal  Pre-Procedure  Performed by INDRA RIVAS  Location:   Procedure Times:12/7/2018 7:24 AM and 12/7/2018 7:33 AM  Pre-Anesthestic Checklist: patient identified, IV checked, risks and benefits discussed, informed consent, monitors and equipment checked, pre-op evaluation and at physician/surgeon's request    Timeout  Correct Patient: Yes   Correct Procedure: Yes   Correct Site: Yes   Correct Laterality: N/A   Correct Position: Yes   Site Marked: N/A   .   Procedure Documentation    .    Procedure:    Intrathecal.  Insertion Site:L3-4  (left paramedian approach)      Patient Prep;mask, sterile gloves, povidone-iodine 7.5% surgical scrub.  .  Needle: Sprotte Spinal Needle (gauge): 24  Spinal/LP Needle Length (inches): 3.5 Introducer used .       Assessment/Narrative  Paresthesias: No.  .  .  clear CSF fluid removed . Comments:  Bupivicaine 15mg    R Rivas

## 2018-12-07 NOTE — ANESTHESIA PREPROCEDURE EVALUATION
PAC NOTE:       ANESTHESIA PRE EVALUATION:  Anesthesia Evaluation     .             ROS/MED HX    ENT/Pulmonary:     (+)sleep apnea, tobacco use, Past use doesn't use CPAP , . .    Neurologic:  - neg neurologic ROS   (+)TIA     Cardiovascular:     (+) hypertension----. : . . . :. .       METS/Exercise Tolerance:     Hematologic:  - neg hematologic  ROS       Musculoskeletal:  - neg musculoskeletal ROS       GI/Hepatic:  - neg GI/hepatic ROS       Renal/Genitourinary:  - ROS Renal section negative       Endo: Comment: .Body mass index is 34.99 kg/(m^2).      (+) type II DM .      Psychiatric:  - neg psychiatric ROS       Infectious Disease:  - neg infectious disease ROS       Malignancy:         Other: Comment: .Lab Test        11/26/18     10/30/18     10/22/18      --          12/24/12                       0946                            0628           --           0804          WBC          8.3          10.2         14.5*          < >        7.7           HGB          10.8*        11.0*        11.1*          < >        13.2*         MCV          95           95           92             < >        92            PLT          255          535*         488*           < >        227           INR           --           --           --           --          1.05           < > = values in this interval not displayed.                  Lab Test        11/26/18     11/05/18     10/30/18                       0946          0944                            NA           144          139          137           POTASSIUM    4.7          4.7          5.2*          CHLORIDE     111*         110*         105           CO2          24           21           19*           BUN          16           36*          40*           CR           1.00         1.39*        1.08          ANIONGAP     9            8            13            SHANNAN          9.8          9.0          9.7           GLC          131*         138*         91                                 Physical Exam  Normal systems: cardiovascular and pulmonary    Airway   Mallampati: II    Dental     Cardiovascular   Rhythm and rate: regular and normal      Pulmonary    breath sounds clear to auscultation             Anesthesia Plan      History & Physical Review  History and physical reviewed and following examination; no interval change.    ASA Status:  3 .        Plan for Periph. Nerve Block for postop pain and Spinal   PONV prophylaxis:  Ondansetron (or other 5HT-3) and Dexamethasone or Solumedrol       Postoperative Care  Postoperative pain management:  IV analgesics, Oral pain medications, Multi-modal analgesia and Neuraxial analgesia.      Consents                            .

## 2018-12-07 NOTE — ANESTHESIA CARE TRANSFER NOTE
Patient: Lance Ibrahim    Procedure(s):  ARTHROPLASTY KNEE right    Diagnosis: DJD  Diagnosis Additional Information: No value filed.    Anesthesia Type:   Periph. Nerve Block for postop pain, Spinal     Note:  Airway :Room Air  Patient transferred to:PACU  Comments: To PACU, report to  RN.      Vitals: (Last set prior to Anesthesia Care Transfer)    CRNA VITALS  12/7/2018 0841 - 12/7/2018 0914      12/7/2018             Pulse: 87    SpO2: 99 %                Electronically Signed By: RANJAN Maxwell CRNA  December 7, 2018  9:14 AM

## 2018-12-07 NOTE — PLAN OF CARE
Problem: Patient Care Overview  Goal: Plan of Care/Patient Progress Review  PT: PT orders received, eval completed, treatment initiated. Patient is a 74 y/o male s/p R TKA, POD#0. Patient lives with wife in a house with 5 stairs to enter and 15 stairs within to access bedroom/bathroom. 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 Patient independent with all functional mobility tasks at baseline and reports not using any assistive device at baseline.    Discharge Planner PT   Patient plan for discharge: TCU  Current status: PT: Patient supine upon arrival. Patient educated on PT role and POC; agreeable to session. Patient completes supine LE strengthening; unable to independently SLR at this time, KI needed for OOB mobility. Patient BP in supine 165/94. Patient completes supine <> sit with SBA; cues for technique. Patient BP at /88 with no c/o lightheadedness/dizziness. Patient completes sit <> stand with FWW and Min A/CGA x2; cues for technique. In standing, patient immediately buckled on R LE with KI donned; no further OOB mobility assessed at this time. Nursing updated. Patient supine at end of session with all needs in reach and nursing present.   Barriers to return to prior living situation: stairs, limited assistance from spouse   Recommendations for discharge: TCU  Rationale for recommendations: Patient functioning below baseline; currently needing assist with mobility tasks. Anticipate patient will benefit from continued therapy at TCU upon discharge to progress strength, ROM, and independence with functional mobility.        Entered by: Alma Kennedy 12/07/2018 4:08 PM

## 2018-12-07 NOTE — ANESTHESIA POSTPROCEDURE EVALUATION
Patient: Lance Ibrahim    Procedure(s):  ARTHROPLASTY KNEE right    Diagnosis:DJD  Diagnosis Additional Information: DJD right knee    Anesthesia Type:  Periph. Nerve Block for postop pain, Spinal    Note:  Anesthesia Post Evaluation    Patient location during evaluation: PACU  Patient participation: Able to fully participate in evaluation  Level of consciousness: awake  Pain management: adequate  Airway patency: patent  Cardiovascular status: acceptable  Respiratory status: acceptable  Hydration status: acceptable  PONV: controlled     Anesthetic complications: None          Last vitals:  Vitals:    12/07/18 0930 12/07/18 0945 12/07/18 0955   BP: 155/87 154/85 150/88   Pulse:      Resp: 10 16 15   Temp:   97.3  F (36.3  C)   SpO2: 96% 95% 94%         Electronically Signed By: David Rivas DO  December 7, 2018  10:07 AM

## 2018-12-08 ENCOUNTER — APPOINTMENT (OUTPATIENT)
Dept: PHYSICAL THERAPY | Facility: CLINIC | Age: 73
DRG: 470 | End: 2018-12-08
Attending: ORTHOPAEDIC SURGERY
Payer: COMMERCIAL

## 2018-12-08 LAB
GLUCOSE BLDC GLUCOMTR-MCNC: 104 MG/DL (ref 70–99)
GLUCOSE BLDC GLUCOMTR-MCNC: 130 MG/DL (ref 70–99)
GLUCOSE BLDC GLUCOMTR-MCNC: 139 MG/DL (ref 70–99)
GLUCOSE BLDC GLUCOMTR-MCNC: 97 MG/DL (ref 70–99)
GLUCOSE SERPL-MCNC: 112 MG/DL (ref 70–99)
HGB BLD-MCNC: 9.3 G/DL (ref 13.3–17.7)

## 2018-12-08 PROCEDURE — 85018 HEMOGLOBIN: CPT | Performed by: ORTHOPAEDIC SURGERY

## 2018-12-08 PROCEDURE — 97530 THERAPEUTIC ACTIVITIES: CPT | Mod: GP

## 2018-12-08 PROCEDURE — 25000132 ZZH RX MED GY IP 250 OP 250 PS 637: Performed by: ORTHOPAEDIC SURGERY

## 2018-12-08 PROCEDURE — 25000128 H RX IP 250 OP 636: Performed by: ORTHOPAEDIC SURGERY

## 2018-12-08 PROCEDURE — 97110 THERAPEUTIC EXERCISES: CPT | Mod: GP

## 2018-12-08 PROCEDURE — 82947 ASSAY GLUCOSE BLOOD QUANT: CPT | Performed by: ORTHOPAEDIC SURGERY

## 2018-12-08 PROCEDURE — 97116 GAIT TRAINING THERAPY: CPT | Mod: GP

## 2018-12-08 PROCEDURE — 40000193 ZZH STATISTIC PT WARD VISIT

## 2018-12-08 PROCEDURE — 36415 COLL VENOUS BLD VENIPUNCTURE: CPT | Performed by: ORTHOPAEDIC SURGERY

## 2018-12-08 PROCEDURE — 00000146 ZZHCL STATISTIC GLUCOSE BY METER IP

## 2018-12-08 PROCEDURE — 12000000 ZZH R&B MED SURG/OB

## 2018-12-08 RX ADMIN — LORATADINE 10 MG: 10 TABLET ORAL at 20:29

## 2018-12-08 RX ADMIN — METFORMIN HYDROCHLORIDE 1000 MG: 500 TABLET ORAL at 08:21

## 2018-12-08 RX ADMIN — FINASTERIDE 5 MG: 5 TABLET, FILM COATED ORAL at 08:46

## 2018-12-08 RX ADMIN — OXYCODONE HYDROCHLORIDE 10 MG: 5 TABLET ORAL at 16:27

## 2018-12-08 RX ADMIN — SODIUM CHLORIDE: 9 INJECTION, SOLUTION INTRAVENOUS at 03:30

## 2018-12-08 RX ADMIN — OXYCODONE HYDROCHLORIDE 10 MG: 5 TABLET ORAL at 08:21

## 2018-12-08 RX ADMIN — ACETAMINOPHEN 975 MG: 325 TABLET, FILM COATED ORAL at 12:24

## 2018-12-08 RX ADMIN — ASPIRIN 325 MG: 325 TABLET, DELAYED RELEASE ORAL at 08:46

## 2018-12-08 RX ADMIN — TAMSULOSIN HYDROCHLORIDE 0.4 MG: 0.4 CAPSULE ORAL at 08:46

## 2018-12-08 RX ADMIN — FERROUS GLUCONATE 324 MG: 324 TABLET ORAL at 08:46

## 2018-12-08 RX ADMIN — FLUTICASONE PROPIONATE 1 SPRAY: 50 SPRAY, METERED NASAL at 08:48

## 2018-12-08 RX ADMIN — ACETAMINOPHEN 975 MG: 325 TABLET, FILM COATED ORAL at 01:58

## 2018-12-08 RX ADMIN — KETOROLAC TROMETHAMINE 15 MG: 15 INJECTION, SOLUTION INTRAMUSCULAR; INTRAVENOUS at 03:31

## 2018-12-08 RX ADMIN — FLUTICASONE PROPIONATE 1 SPRAY: 50 SPRAY, METERED NASAL at 20:30

## 2018-12-08 RX ADMIN — SENNOSIDES AND DOCUSATE SODIUM 2 TABLET: 8.6; 5 TABLET ORAL at 08:47

## 2018-12-08 RX ADMIN — ACETAMINOPHEN 975 MG: 325 TABLET, FILM COATED ORAL at 20:30

## 2018-12-08 RX ADMIN — OXYCODONE HYDROCHLORIDE 10 MG: 5 TABLET ORAL at 20:29

## 2018-12-08 RX ADMIN — OXYCODONE HYDROCHLORIDE 10 MG: 5 TABLET ORAL at 12:24

## 2018-12-08 RX ADMIN — OXYCODONE HYDROCHLORIDE 10 MG: 5 TABLET ORAL at 01:58

## 2018-12-08 RX ADMIN — LISINOPRIL 20 MG: 10 TABLET ORAL at 08:46

## 2018-12-08 RX ADMIN — METFORMIN HYDROCHLORIDE 1000 MG: 500 TABLET ORAL at 17:20

## 2018-12-08 ASSESSMENT — PAIN DESCRIPTION - DESCRIPTORS: DESCRIPTORS: ACHING;CONSTANT

## 2018-12-08 ASSESSMENT — ACTIVITIES OF DAILY LIVING (ADL)
ADLS_ACUITY_SCORE: 11
ADLS_ACUITY_SCORE: 10
ADLS_ACUITY_SCORE: 11
ADLS_ACUITY_SCORE: 10
ADLS_ACUITY_SCORE: 13

## 2018-12-08 NOTE — PLAN OF CARE
Problem: Patient Care Overview  Goal: Plan of Care/Patient Progress Review  Discharge Planner PT   Patient plan for discharge: TCU ([facility])  Current status: Pt demonstrates improving mobility - able to ambulate in room with WW and CGA.  KI NEEDED OOB as pt unable to complete SLR independently.  Supine>sit and sit to stand with CGA, requires Juan Jose for elevating RLE back into bed.  Barriers to return to prior living situation: Pt does not have family able to assist, stairs to enter home and up to bedroom  Recommendations for discharge: TCU  Rationale for recommendations: Pt will require ongoing skilled physical therapy to progress mobility prior to being able to manage home mobility.       Entered by: Beata Bates 12/08/2018 8:50 AM

## 2018-12-08 NOTE — PLAN OF CARE
Problem: Patient Care Overview  Goal: Plan of Care/Patient Progress Review  Outcome: Improving  A&O x4. Up w/Ax2 w/walker and KI. On 3L O2 w/continuous pulse ox. BS hypoactive, passing flatus. Tolerating regular diet well. Voiding well. CMS intact. IVF infusing. Pain managed w/PRN oxycodone as well as scheduled Tylenol and IV Toradol. Plan to discharge to TCU when able. Pt requesting Lelo.

## 2018-12-08 NOTE — PLAN OF CARE
Problem: Patient Care Overview  Goal: Plan of Care/Patient Progress Review  Outcome: Improving  Day RN  VS monitored, 2l NC when sleeping, up w/A1 and ww and david GUERRERO C/D/I, CMS+ ex 1-2+ edema bilateral LE's, hypo BS, flatus+, marjan mod CHO diet, voiding, Tylenol/Oxycodone for pain, TCU upon discharge, will cont to monitor.

## 2018-12-08 NOTE — PLAN OF CARE
Problem: Patient Care Overview  Goal: Plan of Care/Patient Progress Review  Outcome: Improving  Pt A&Ox4. VSS afebrile. O2 at 2L late in shift due to sats dropping to 85%. IV infusing. Pt not on capnography but is on continuous pulse oximetry.IV Cleocin. Cms intact. Block wearing off. Pain managed with Oxycodone, scheduled Tylenol and Torodol. Dressing c/d/i.  and 134. Tolerated regular diet. Up assist of 2 with gait belt, walker and immobilizer. PT stated he buckled with the immobilizer on also and asked that pt not walk tonight. Voiding adequate amounts. Will continue to monitor

## 2018-12-08 NOTE — PROGRESS NOTES
Received a call from Tahira at [facility] reporting no bed availability until 12/12.   Received a call from Kristine who reported they a bed available for patient but will need insurance verification.

## 2018-12-08 NOTE — CONSULTS
Care Transition Initial Assessment -   Reason For Consult: discharge planning  Met with: Patient and Family    Active Problems:    Total knee replacement status       DATA  Lives With: spouse  Living Arrangements: house  Description of Support System: Supportive, Involved  Who is your support system?: Wife  Support Assessment: Adequate family and caregiver support, Adequate social supports.   Identified issues/concerns regarding health management: Discharge planning             Quality Of Family Relationships: supportive, helpful, involved  Transportation Available: car, other (see comments) (scheduling a medical cab  )  Patient would like transport scheduled. Patient aware of cost.   ASSESSMENT  Cognitive Status:  awake, alert and oriented  Concerns to be addressed: Patient reported he resides with his wife in a house. Patient reported his wife is bed bound and receives assistance through [facility]. He noted right now care check on her three times a day. Writer had length discuss about wife as she bed bound. Patient reported wife is able to dial 911 in an emergency. He noted she doesn't need 24 hours supervision. Patient reported he works full time as  at [employer]. Patient requested for referral for TCU to send [facility] as [facility]. Patient reported he is fine with double bed.      PLAN  Referral send to [facility] and [facility].

## 2018-12-08 NOTE — PROGRESS NOTES
Lance Ibrahim  12/8/2018  POD # 1  Subjective:     Doing well.  No immediate surgical complications identified.  Pain well-controlled.  Objective:    Exam:  CMS: intact  alert, stable, wound ok        PLAN: Start physical therapy  Discharge plan: Skilled Nursing Facility transfer Monday.

## 2018-12-08 NOTE — PLAN OF CARE
Problem: Patient Care Overview  Goal: Plan of Care/Patient Progress Review  OT order received, chart reviewed, and note pt. Scheduled to go to TCU for further care.  Will cancel OT orders at this time and hold for TCU.

## 2018-12-09 ENCOUNTER — APPOINTMENT (OUTPATIENT)
Dept: PHYSICAL THERAPY | Facility: CLINIC | Age: 73
DRG: 470 | End: 2018-12-09
Attending: ORTHOPAEDIC SURGERY
Payer: COMMERCIAL

## 2018-12-09 LAB
GLUCOSE BLDC GLUCOMTR-MCNC: 119 MG/DL (ref 70–99)
GLUCOSE BLDC GLUCOMTR-MCNC: 128 MG/DL (ref 70–99)
GLUCOSE BLDC GLUCOMTR-MCNC: 184 MG/DL (ref 70–99)
GLUCOSE SERPL-MCNC: 134 MG/DL (ref 70–99)
HGB BLD-MCNC: 9 G/DL (ref 13.3–17.7)

## 2018-12-09 PROCEDURE — 36415 COLL VENOUS BLD VENIPUNCTURE: CPT | Performed by: ORTHOPAEDIC SURGERY

## 2018-12-09 PROCEDURE — 97116 GAIT TRAINING THERAPY: CPT | Mod: GP | Performed by: PHYSICAL THERAPY ASSISTANT

## 2018-12-09 PROCEDURE — 00000146 ZZHCL STATISTIC GLUCOSE BY METER IP

## 2018-12-09 PROCEDURE — 85018 HEMOGLOBIN: CPT | Performed by: ORTHOPAEDIC SURGERY

## 2018-12-09 PROCEDURE — 97112 NEUROMUSCULAR REEDUCATION: CPT | Mod: GP | Performed by: PHYSICAL THERAPY ASSISTANT

## 2018-12-09 PROCEDURE — 40000193 ZZH STATISTIC PT WARD VISIT: Performed by: PHYSICAL THERAPY ASSISTANT

## 2018-12-09 PROCEDURE — 25000131 ZZH RX MED GY IP 250 OP 636 PS 637: Performed by: ORTHOPAEDIC SURGERY

## 2018-12-09 PROCEDURE — 12000000 ZZH R&B MED SURG/OB

## 2018-12-09 PROCEDURE — 25000132 ZZH RX MED GY IP 250 OP 250 PS 637: Performed by: ORTHOPAEDIC SURGERY

## 2018-12-09 PROCEDURE — 82947 ASSAY GLUCOSE BLOOD QUANT: CPT | Performed by: ORTHOPAEDIC SURGERY

## 2018-12-09 PROCEDURE — 97530 THERAPEUTIC ACTIVITIES: CPT | Mod: GP | Performed by: PHYSICAL THERAPY ASSISTANT

## 2018-12-09 PROCEDURE — 97110 THERAPEUTIC EXERCISES: CPT | Mod: GP | Performed by: PHYSICAL THERAPY ASSISTANT

## 2018-12-09 RX ADMIN — ACETAMINOPHEN 975 MG: 325 TABLET, FILM COATED ORAL at 04:35

## 2018-12-09 RX ADMIN — ASPIRIN 325 MG: 325 TABLET, DELAYED RELEASE ORAL at 08:46

## 2018-12-09 RX ADMIN — FLUTICASONE PROPIONATE 1 SPRAY: 50 SPRAY, METERED NASAL at 21:47

## 2018-12-09 RX ADMIN — ONDANSETRON 4 MG: 4 TABLET, ORALLY DISINTEGRATING ORAL at 13:10

## 2018-12-09 RX ADMIN — SENNOSIDES AND DOCUSATE SODIUM 2 TABLET: 8.6; 5 TABLET ORAL at 08:47

## 2018-12-09 RX ADMIN — TAMSULOSIN HYDROCHLORIDE 0.4 MG: 0.4 CAPSULE ORAL at 08:45

## 2018-12-09 RX ADMIN — METFORMIN HYDROCHLORIDE 1000 MG: 500 TABLET ORAL at 08:46

## 2018-12-09 RX ADMIN — OXYCODONE HYDROCHLORIDE 10 MG: 5 TABLET ORAL at 08:45

## 2018-12-09 RX ADMIN — FINASTERIDE 5 MG: 5 TABLET, FILM COATED ORAL at 08:46

## 2018-12-09 RX ADMIN — LISINOPRIL 20 MG: 10 TABLET ORAL at 08:45

## 2018-12-09 RX ADMIN — ACETAMINOPHEN 975 MG: 325 TABLET, FILM COATED ORAL at 16:45

## 2018-12-09 RX ADMIN — LORATADINE 10 MG: 10 TABLET ORAL at 21:46

## 2018-12-09 RX ADMIN — METFORMIN HYDROCHLORIDE 1000 MG: 500 TABLET ORAL at 17:54

## 2018-12-09 RX ADMIN — FLUTICASONE PROPIONATE 1 SPRAY: 50 SPRAY, METERED NASAL at 08:47

## 2018-12-09 RX ADMIN — FERROUS GLUCONATE 324 MG: 324 TABLET ORAL at 08:46

## 2018-12-09 ASSESSMENT — ACTIVITIES OF DAILY LIVING (ADL)
ADLS_ACUITY_SCORE: 14
ADLS_ACUITY_SCORE: 14
ADLS_ACUITY_SCORE: 16
ADLS_ACUITY_SCORE: 14

## 2018-12-09 NOTE — DOWNTIME EVENT NOTE
The EMR was down for 6 hours on 12/9/2018.    Marcella Mccullough RN was responsible for completing the paper charting during this time period.     The following information was re-entered into the system by Marcella Mccullough: Patient care assesment, MAR, neuro assessment, pain assessment, and I&Os    The following information will remain in the paper chart: none    Marcella Mccullough  12/9/2018

## 2018-12-09 NOTE — PLAN OF CARE
A&O x4. BP and HR elevated, otherwise VSS. LS CTA all fields. Dressing to R knee is CDI. Baseline swelling to BLE otherwise CMS intact. marjan PO well, up with A1 with KI and walker. PO tylenol  managing pain. voiding in good amts, dark urban urine.plans to dc to TCU when able. Will continue to monitor.

## 2018-12-09 NOTE — PLAN OF CARE
Problem: Patient Care Overview  Goal: Plan of Care/Patient Progress Review  Outcome: Improving  VSS. Alert and oriented. Assist of 1/ knee immobilizer/ gait belt. Pain 4/10, taking 10 mg of oxycodone. +2 edema bilaterally. Dressing CDI.

## 2018-12-09 NOTE — PLAN OF CARE
Day RN  VS monitored, 2L NC when sleeping, drsg C/D/I, CMS+ ex baseline edema bilateral LE's, voiding, Tylenol/Oxycodone for pain, up w/A1 and ww, pt c/o nausea this afternoon, PO Zofran given, encouraged pt to ambulate and drink fluids, TCU Monday, will cont to monitor.

## 2018-12-09 NOTE — OP NOTE
Procedure Date: 12/07/2018      PREOPERATIVE DIAGNOSIS:  Degenerative joint disease, right knee.      POSTOPERATIVE DIAGNOSIS:  Degenerative joint disease, right knee.      PROCEDURE:  Right total knee arthroplasty.      ANESTHESIA:  Spinal with sedation.      FIRST ASSISTANT:  PAULINA Cordoba      INDICATIONS:  This 73-year-old man has had chronic and increasing problems with right knee pain and has failed a nonoperative treatment program.  Appropriate risks and alternatives have been discussed at length, and he has elected to proceed with surgical intervention.      DESCRIPTION OF PROCEDURE:  The patient was brought to the operating room, given a general anesthetic, right knee prepped and draped sterilely in the usual manner.      Under tourniquet control, we made our standard anteromedial incision.  Dissection was carried down sharply to the fascia.  Medial parapatellar arthrotomy was done.  Patella slid laterally and the knee flexed to 90 degrees.  Inspection of the joint revealed complete loss of articular cartilage, primarily patellofemoral and lateral compartment.      We used the intramedullary guide system to osteotomize the femur in 5 degrees of valgus to accept #5 size components, cruciate retaining.  We osteotomized the tibia to accept a #5 size tibial component.  Patella was cut flush and patellar thickness recreated with use of a caliper.  A 33 mm patella button was chosen.  Trial reduction was done.  Patella was subluxed laterally so we did a little lateral release.  We then had excellent patellar tracking with excellent realignment, excellent ligament balance, gap balance was excellent and range of motion was full.      Trial components were removed, bony surfaces water picked with antibiotic solution and the real components cemented into place, tibia first followed by femur and patella.  When the cement had hardened, excess posterior cement was trimmed away.  We again trialed the tray and chose  a 12 mm thick poly component.  This was snapped onto the tibial tray.  Final range of motion and patellar tracking was again excellent.      Wound was irrigated copiously with antibiotic solution.  Hemostasis was obtained by electrocautery.  Closure done in layers, closing the fascia with #1 Vicryl, subcutaneous tissue with 2-0 Vicryl, staples on the skin.  Sterile compressive dressing applied.  The patient awakened and taken to the recovery room in good condition.  There were no intraoperative complications and minimal blood loss.         FORREST BAUTISTA MD             D: 2018   T: 2018   MT: BRIDGETT      Name:     CURTIS SAHU   MRN:      [id number]        Account:        [id number]   :      1945           Procedure Date: 2018      Document: [id number]       cc: Forrest Bautista MD

## 2018-12-09 NOTE — PLAN OF CARE
Discharge Planner PT   Patient plan for discharge: TCU not sure if [facility] or [facility] via medical transport  Current status: no KI needed per protocol  this date, gait with CGA and RW to 35 feet bed mobility with Min A for LE into and out of bed and S for repositioning  Barriers to return to prior living situation: Pt does not have family able to assist, stairs to enter home and up to bedroom  Recommendations for discharge: TCU per plan established by the PT.    Rationale for recommendations: With continued skilled PT will regain Ind for return home with limited need for assist        Entered by: Niru Johnson 12/09/2018 11:10 AM     PT- PM limited session due to upset stomach. Please attempt with RN staff to walk 2-3 times this evening. Note left for SW as wondering where he is discharging to tomorrow.

## 2018-12-10 ENCOUNTER — APPOINTMENT (OUTPATIENT)
Dept: PHYSICAL THERAPY | Facility: CLINIC | Age: 73
DRG: 470 | End: 2018-12-10
Attending: ORTHOPAEDIC SURGERY
Payer: COMMERCIAL

## 2018-12-10 LAB
ERYTHROCYTE [DISTWIDTH] IN BLOOD BY AUTOMATED COUNT: 18.8 % (ref 10–15)
HCT VFR BLD AUTO: 30.6 % (ref 40–53)
HGB BLD-MCNC: 9.3 G/DL (ref 13.3–17.7)
LACTATE BLD-SCNC: 1.2 MMOL/L (ref 0.7–2)
LACTATE BLD-SCNC: 2.8 MMOL/L (ref 0.7–2)
MCH RBC QN AUTO: 28.5 PG (ref 26.5–33)
MCHC RBC AUTO-ENTMCNC: 30.4 G/DL (ref 31.5–36.5)
MCV RBC AUTO: 94 FL (ref 78–100)
PLATELET # BLD AUTO: 266 10E9/L (ref 150–450)
RBC # BLD AUTO: 3.26 10E12/L (ref 4.4–5.9)
WBC # BLD AUTO: 8.8 10E9/L (ref 4–11)

## 2018-12-10 PROCEDURE — 12000000 ZZH R&B MED SURG/OB

## 2018-12-10 PROCEDURE — 36415 COLL VENOUS BLD VENIPUNCTURE: CPT | Performed by: INTERNAL MEDICINE

## 2018-12-10 PROCEDURE — 40000193 ZZH STATISTIC PT WARD VISIT: Performed by: PHYSICAL THERAPY ASSISTANT

## 2018-12-10 PROCEDURE — 97110 THERAPEUTIC EXERCISES: CPT | Mod: GP | Performed by: PHYSICAL THERAPY ASSISTANT

## 2018-12-10 PROCEDURE — 25000132 ZZH RX MED GY IP 250 OP 250 PS 637: Performed by: ORTHOPAEDIC SURGERY

## 2018-12-10 PROCEDURE — 36415 COLL VENOUS BLD VENIPUNCTURE: CPT | Performed by: ORTHOPAEDIC SURGERY

## 2018-12-10 PROCEDURE — 83605 ASSAY OF LACTIC ACID: CPT | Performed by: ORTHOPAEDIC SURGERY

## 2018-12-10 PROCEDURE — 85027 COMPLETE CBC AUTOMATED: CPT | Performed by: INTERNAL MEDICINE

## 2018-12-10 PROCEDURE — 00000146 ZZHCL STATISTIC GLUCOSE BY METER IP

## 2018-12-10 PROCEDURE — 97116 GAIT TRAINING THERAPY: CPT | Mod: GP | Performed by: PHYSICAL THERAPY ASSISTANT

## 2018-12-10 PROCEDURE — 83605 ASSAY OF LACTIC ACID: CPT | Performed by: INTERNAL MEDICINE

## 2018-12-10 RX ORDER — ASPIRIN 325 MG
325 TABLET, DELAYED RELEASE (ENTERIC COATED) ORAL DAILY
Qty: 90 TABLET | Refills: 3 | Status: ON HOLD | DISCHARGE
Start: 2018-12-11 | End: 2019-03-05

## 2018-12-10 RX ORDER — ASPIRIN 325 MG
325 TABLET ORAL DAILY
DISCHARGE
Start: 2018-12-10 | End: 2019-02-17

## 2018-12-10 RX ORDER — OXYCODONE HYDROCHLORIDE 5 MG/1
5-10 TABLET ORAL EVERY 4 HOURS PRN
Status: SHIPPED | DISCHARGE
Start: 2018-12-10 | End: 2019-02-17

## 2018-12-10 RX ADMIN — ACETAMINOPHEN 975 MG: 325 TABLET, FILM COATED ORAL at 07:52

## 2018-12-10 RX ADMIN — OXYCODONE HYDROCHLORIDE 10 MG: 5 TABLET ORAL at 20:33

## 2018-12-10 RX ADMIN — LISINOPRIL 20 MG: 10 TABLET ORAL at 07:51

## 2018-12-10 RX ADMIN — FINASTERIDE 5 MG: 5 TABLET, FILM COATED ORAL at 07:51

## 2018-12-10 RX ADMIN — ASPIRIN 325 MG: 325 TABLET, DELAYED RELEASE ORAL at 07:52

## 2018-12-10 RX ADMIN — LORATADINE 10 MG: 10 TABLET ORAL at 20:54

## 2018-12-10 RX ADMIN — TAMSULOSIN HYDROCHLORIDE 0.4 MG: 0.4 CAPSULE ORAL at 07:52

## 2018-12-10 RX ADMIN — FLUTICASONE PROPIONATE 1 SPRAY: 50 SPRAY, METERED NASAL at 07:52

## 2018-12-10 RX ADMIN — METFORMIN HYDROCHLORIDE 1000 MG: 500 TABLET ORAL at 18:41

## 2018-12-10 RX ADMIN — FERROUS GLUCONATE 324 MG: 324 TABLET ORAL at 07:52

## 2018-12-10 RX ADMIN — FLUTICASONE PROPIONATE 1 SPRAY: 50 SPRAY, METERED NASAL at 18:42

## 2018-12-10 RX ADMIN — METFORMIN HYDROCHLORIDE 1000 MG: 500 TABLET ORAL at 07:51

## 2018-12-10 RX ADMIN — ACETAMINOPHEN 975 MG: 325 TABLET, FILM COATED ORAL at 00:36

## 2018-12-10 ASSESSMENT — ACTIVITIES OF DAILY LIVING (ADL)
ADLS_ACUITY_SCORE: 14
ADLS_ACUITY_SCORE: 12
ADLS_ACUITY_SCORE: 14
ADLS_ACUITY_SCORE: 12

## 2018-12-10 ASSESSMENT — PAIN DESCRIPTION - DESCRIPTORS: DESCRIPTORS: ACHING

## 2018-12-10 NOTE — PLAN OF CARE
/77 (BP Location: Left arm)   Pulse 94   Temp 96.5  F (35.8  C) (Oral)   Resp 16   Ht 1.829 m (6')   Wt 117 kg (258 lb)   SpO2 91%   BMI 34.99 kg/m    Pt denies pain. CMS+, Dressing CDI. Denies SOB, N/V. SBA walker/gait belt. Pt has all needs met per pt. Will cont to monitor POC.

## 2018-12-10 NOTE — PLAN OF CARE
Neuro: A & O x 4. CMS intact.    VS:  VSS  Tele: N/A  Respiratory: WDL  GI: WDL  : WDL  Skin: incision site WDL. Edema bilaterally to LE.  Pain: Minimal pain to knee. Controled with PO tylenol.    IV: PIV removed this shift.   Transfer: A1 with walker and gait belt.   Diet: Tolerating po foods and fluids well.   Plan: TBD. Possible discharge later today or tomorrow. Hospitalist and Ortho following.

## 2018-12-10 NOTE — PROGRESS NOTES
SWS     D: Discharge planning continuing.. Noted plan for pt's discharge today. Contact made to [facility], they are clinically able to accept pt and have a bed available, they are awaiting insurance authorization.       I: Met with pt and discussed above, he has requested that medical transport be arranged for him upon discharge, discussed with him the $70/base and $4.50/mi anticipated transport cost (unless his [facility] would cover this cost) which he acknowledges and accepts.       A/P: Will continue planning per insurance authorization.       Addendum:      D: As of this time no insurance authorization has been received by [facility], pt aware.

## 2018-12-10 NOTE — PROGRESS NOTES
[facility]    Sepsis Evaluation Progress Note    Date of Service: 12/10/2018    I was called to see Lance Ibrahim due to abnormal vital signs triggering the Sepsis SIRS screening alert. He is not known to have an infection.     Physical Exam    Vital Signs:  Temp: 99.2  F (37.3  C) Temp src: Oral BP: 151/78   Heart Rate: 117 Resp: 22 SpO2: 90 % O2 Device: None (Room air) Oxygen Delivery: 2 LPM    Lab:  Lactic Acid   Date Value Ref Range Status   10/21/2018 2.1 (H) 0.7 - 2.0 mmol/L Final     Lactate for Sepsis Protocol   Date Value Ref Range Status   12/10/2018 2.8 (H) 0.7 - 2.0 mmol/L Final     Comment:     Significant value called to and read back by  SAVANNAH OVALLE ([id number]) ON 12.10.18 AT 0054 BY KV         The patient is at baseline mental status.    The rest of their physical exam is significant for Slight tachypnea and tachycardia, he has BRIANNA, placed on 2 l oxygen, comfortable    Assessment and Plan    The SIRS and exam findings are likely due to pain and hypoxia due to BRIANNA, there is no sign of sepsis at this time.    Disposition: The patient will remain on the current unit. We will continue to monitor this patient closely.    Sami Keller MD

## 2018-12-11 VITALS
SYSTOLIC BLOOD PRESSURE: 157 MMHG | HEIGHT: 72 IN | OXYGEN SATURATION: 94 % | DIASTOLIC BLOOD PRESSURE: 73 MMHG | HEART RATE: 94 BPM | TEMPERATURE: 96.6 F | WEIGHT: 258 LBS | RESPIRATION RATE: 16 BRPM | BODY MASS INDEX: 34.95 KG/M2

## 2018-12-11 LAB — GLUCOSE BLDC GLUCOMTR-MCNC: 105 MG/DL (ref 70–99)

## 2018-12-11 PROCEDURE — 25000132 ZZH RX MED GY IP 250 OP 250 PS 637: Performed by: ORTHOPAEDIC SURGERY

## 2018-12-11 RX ADMIN — OXYCODONE HYDROCHLORIDE 10 MG: 5 TABLET ORAL at 12:02

## 2018-12-11 RX ADMIN — FINASTERIDE 5 MG: 5 TABLET, FILM COATED ORAL at 08:11

## 2018-12-11 RX ADMIN — TAMSULOSIN HYDROCHLORIDE 0.4 MG: 0.4 CAPSULE ORAL at 08:11

## 2018-12-11 RX ADMIN — LISINOPRIL 20 MG: 10 TABLET ORAL at 08:11

## 2018-12-11 RX ADMIN — METFORMIN HYDROCHLORIDE 1000 MG: 500 TABLET ORAL at 08:11

## 2018-12-11 RX ADMIN — ASPIRIN 325 MG: 325 TABLET, DELAYED RELEASE ORAL at 08:11

## 2018-12-11 RX ADMIN — FERROUS GLUCONATE 324 MG: 324 TABLET ORAL at 08:12

## 2018-12-11 RX ADMIN — FLUTICASONE PROPIONATE 1 SPRAY: 50 SPRAY, METERED NASAL at 08:12

## 2018-12-11 ASSESSMENT — ACTIVITIES OF DAILY LIVING (ADL)
ADLS_ACUITY_SCORE: 12

## 2018-12-11 NOTE — PLAN OF CARE
Late entry:    A&O x4. Up w/Ax1 w/walker. On 2L O2 overnight. Triggered sepsis d/t tachy (low 100s) and increased respirations - lactate 2.8 - MD notified and ordered lab recheck this morning. Pt asymptomatic. Temporal temp: 98.7. BS/flatus+. Tolerating regular diet well. Voiding well. CMS intact. Denies having much pain overnight. Given scheduled Tylenol. Plan to discharge to [facility] today.

## 2018-12-11 NOTE — DISCHARGE INSTRUCTIONS
Return to clinic in 10-14 days.  Call [phone] to schedule or if you experience any problems before your scheduled appointment.      See general discharge instruction sheet for knees  1. Do exercises at home as instructed by therapist twice a day. Continue outpatient therapy as ordered by your doctor.  2. Ice knee after exercises and therapy.  3. Examine dressing daily for problems.  4. May shower, can get dressing wet, no soaking (no bathtubs, pools or hot tubs)  5. Notify your dentist or physician of your implant so you can get antibiotics before any dental work or before any invasive procedure (ie: colonoscopy)  6. Remove anti-embolism stockings (Billy hose) for 30 minutes twice daily.      Diet: Moderate CHO diet    Aquacel dressing:  DO NOT CHANGE DRESSING DAILY.  Leave this dressing on until follow-up appointment with Orthopedic Surgeon.  Dressing is waterproof, can shower with it on, pat dry when done.  No soaking such as in tub baths, pools or hot tubs        While on narcotic pain medication, to prevent constipation:  1. Drink plenty of water to keep well hydrated   2. May take over the counter Colace or Senna (follow instructions on label)        Call your physician if: ([phone])   1. Persistent fever greater than 100 degrees with body chills or excessive sweating.  2. Increased/persistent redness, localized warmth, tenderness, drainage or swelling at incision site. Greater than 50% drainage on dressing.   3. Persistent pain not controlled with oral pain medications, ice and rest.   4. No bowel movement in 3 days (may use Milk of Magnesia or other over the counter remedy).  5. Chest pain, shortness of breath, and/or calf pain with excessive swelling.  6. Generalized feeling of illness.  7. Any other questions or concerns related to your surgery/recovery.    Thank you for allowing [facility] to participate in your cares!!

## 2018-12-11 NOTE — PLAN OF CARE
A/O. CMS intact.  2+ edema to BLE.  Dressing intact with scant amount of drainage.  Voiding.  Tolerating regular diet.  Oxycodone given x1 at HS. Up with Ax1 et walker.  [facility] pending insurance approval. Will continue to monitor.

## 2018-12-11 NOTE — PROGRESS NOTES
BORA     D: Discharge planning continuing.. Call placed this morning to admissions coordinator at [facility] who notes they have not yet received insurance authorization, she will call pt later this morning to update him as he reached out to her yesterday with inquiries about the authorization. TARA will continue planning once authorization has been obtained.       Addendum:       D: Call received from [facility] admissions coordinator, they have received insurance authorization and able to accept pt today, pt updated. Pt has requested medical transport, [facility], w/c arranged for 1500.       .PAS-RR    D: Per DHS regulation, TARA completed and submitted PAS-RR to [place] [employer] Direct [employer] via the Senior [employer] Line.  PAS-RR confirmation # is : [id number]

## 2018-12-11 NOTE — PLAN OF CARE
A&O x4. Up w/Ax1 w/walker. On 2L O2 overnight. BS/flatus+. Tolerating regular diet well. Voiding well. CMS intact. Dressing C/D/I. Denies having much pain. Slept well overnight. Plan is to discharge to [facility] today.

## 2018-12-11 NOTE — PROGRESS NOTES
Reviewed discharge instructions and medications with patient. Questions answered. Patient discharged to [facility] with [facility] transport, discharge instructions, medication scripts, and belongings at this time.

## 2018-12-12 ENCOUNTER — NURSING HOME VISIT (OUTPATIENT)
Dept: GERIATRICS | Facility: CLINIC | Age: 73
End: 2018-12-12
Payer: COMMERCIAL

## 2018-12-12 VITALS
TEMPERATURE: 99.2 F | HEIGHT: 72 IN | HEART RATE: 104 BPM | BODY MASS INDEX: 34.29 KG/M2 | WEIGHT: 253.2 LBS | DIASTOLIC BLOOD PRESSURE: 86 MMHG | OXYGEN SATURATION: 93 % | RESPIRATION RATE: 18 BRPM | SYSTOLIC BLOOD PRESSURE: 153 MMHG

## 2018-12-12 DIAGNOSIS — E11.8 TYPE 2 DIABETES MELLITUS WITH COMPLICATION, WITHOUT LONG-TERM CURRENT USE OF INSULIN (H): ICD-10-CM

## 2018-12-12 DIAGNOSIS — N40.0 BENIGN PROSTATIC HYPERPLASIA, UNSPECIFIED WHETHER LOWER URINARY TRACT SYMPTOMS PRESENT: ICD-10-CM

## 2018-12-12 DIAGNOSIS — R60.0 BILATERAL LEG EDEMA: ICD-10-CM

## 2018-12-12 DIAGNOSIS — M17.11 OSTEOARTHRITIS OF RIGHT KNEE, UNSPECIFIED OSTEOARTHRITIS TYPE: Primary | ICD-10-CM

## 2018-12-12 DIAGNOSIS — R53.81 PHYSICAL DECONDITIONING: ICD-10-CM

## 2018-12-12 DIAGNOSIS — I10 BENIGN ESSENTIAL HYPERTENSION: ICD-10-CM

## 2018-12-12 DIAGNOSIS — Z96.651 STATUS POST TOTAL RIGHT KNEE REPLACEMENT: ICD-10-CM

## 2018-12-12 DIAGNOSIS — R00.0 TACHYCARDIA: ICD-10-CM

## 2018-12-12 DIAGNOSIS — D64.9 ANEMIA, UNSPECIFIED TYPE: ICD-10-CM

## 2018-12-12 PROCEDURE — 99310 SBSQ NF CARE HIGH MDM 45: CPT | Performed by: NURSE PRACTITIONER

## 2018-12-12 ASSESSMENT — MIFFLIN-ST. JEOR: SCORE: 1926.51

## 2018-12-12 NOTE — DISCHARGE SUMMARY
Orthopedic Discharge Summary    Lance Ibrahim MRN# [id number]   YOB: 1945 Age: 73 year old     Date of Admission:  12/7/2018  Date of Discharge:  12/11/2018  3:11 PM  Admitting Physician:  Issa Cunha MD  Discharge Physician:  No att. providers found  Discharging Service:  Orthopedics     Home clinic: [facility]  Primary Provider: Anh Spivey          Admission Diagnoses:   DJD  Total knee replacement status          Discharge Diagnosis:   Patient Active Problem List   Diagnosis     Ventral hernia     Chronic rhinitis     Hypertrophy of prostate with urinary obstruction     Transient cerebral ischemia     HTN (hypertension)     CARDIOVASCULAR SCREENING; LDL GOAL LESS THAN 100     Gout     Type 2 diabetes, HbA1c goal < 7% (H)     Obesity     Diabetes mellitus type 2, uncomplicated (H)     Benign essential hypertension     Cervicalgia     Sepsis (H)     Cellulitis     Diabetic foot infection (H)     Amputated toe, left (H)     Type 2 diabetes mellitus with peripheral vascular disease (H)     Total knee replacement status                Discharge Disposition:   Discharged to home           Condition on Discharge:   Discharge condition: Stable           Medications Prior to Admission:     No medications prior to admission.             Discharge Medications:     No current facility-administered medications for this encounter.      Current Outpatient Medications   Medication Sig     aspirin (ASA) 325 MG EC tablet Take 1 tablet (325 mg) by mouth daily     aspirin (ASA) 325 MG tablet Take 1 tablet (325 mg) by mouth daily     finasteride (PROSCAR) 5 MG tablet Take 5 mg by mouth daily.     fluticasone (FLONASE) 50 MCG/ACT spray Spray 1 spray into both nostrils 2 times daily     lisinopril (PRINIVIL/ZESTRIL) 10 MG tablet TAKE 2 TABLETS (20 MG) BY MOUTH DAILY     loratadine (CLARITIN) 10 MG tablet Take 10 mg by mouth At Bedtime     metFORMIN (GLUCOPHAGE) 1000 MG tablet TAKE 1 TABLET  (1,000 MG) BY MOUTH 2 TIMES DAILY (WITH MEALS) **DUE FOR APRIL APPT/LABS. CALL MD**     Multiple Vitamins-Minerals (MULTIVITAMIN ADULTS PO) Take by mouth daily     oxyCODONE (ROXICODONE) 5 MG tablet Take 1-2 tablets (5-10 mg) by mouth every 4 hours as needed     tamsulosin (FLOMAX) 0.4 MG 24 hr capsule Take 1 capsule by mouth daily.     blood glucose monitoring (ACCU-CHEK FASTCLIX) lancets 1 EACH BY IN VITRO ROUTE 2 TIMES DAILY     blood glucose monitoring (ACCU-CHEK SMARTVIEW) test strip USE TO TEST TWICE DAILY               Brief History of Illness:   Lance Ibrahim is a 73 year old male who was admitted for knee osteoarthritis.          Hospital Course:     Total knee replacement status    * No resolved hospital problems. *              Discharge Instructions and Follow-Up:   Discharge diet: Regular   Discharge activity: Activity as tolerated   Discharge follow-up: Follow up with Dr. Cunha in 10-14 days   Outpatient therapy: None    Home Care agency: None    Supplies and equipment: None   Lines and drains: None    Wound care: None   Other instructions: None

## 2018-12-12 NOTE — PROGRESS NOTES
[place] [facility]  PRIMARY CARE PROVIDER AND CLINIC:  Anh Spivey [address] [facility] [address]  Chief Complaint   Patient presents with     Hospital F/U     [place] Medical Record Number:  [id number]  Place of Service where encounter took place:  [facility]    HPI:    Lance Ibrahim is a 73 year old  (1945),admitted to the above facility from  [facility].  Hospital stay 12/7/18 through 12/11/18.  Admitted to this facility for  rehab, medical management and nursing care.  HPI information obtained from: facility chart records, facility staff, patient report and [facility] chart review.    Current issues are:   1. Osteoarthritis of right knee, unspecified osteoarthritis type    2. Status post total right knee replacement    3. Physical deconditioning    4. Tachycardia    5. Anemia, unspecified type    6. Bilateral leg edema    7. Benign essential hypertension    8. Type 2 diabetes mellitus with complication, without long-term current use of insulin (H)    9. Benign prostatic hyperplasia, unspecified whether lower urinary tract symptoms present         Patient was seen today for evaluation after being admitted to TCU following scheduled right total knee replacement on 12/7/18 for osteoarthritis. On exam today Lance is sitting up on the edge of his bed. He tells me he has no pain while sitting. He does report pain of 10/10 that is shooting in nature and runs up his right lateral thigh-cramping in nature with movement. He is asking for pain medications when he needs them and this is preferred. He will work with therapy while he is here. He tells me his hospital stay went well and no concerns were noted. This morning he tells me his legs were both edematous and he does not remember this from the hospital stay. He denies any chest pain, SOB. He is tachycardic today and per chart review was also tachycardic in the hospital, although HR was running  more in the 90s. He tells me his bowels are moving well- last BM yesterday. He does not want any stool softeners scheduled as he reports many episodes of diarrhea the last time he took one. Otherwise he denies SOB, cough, chest pain, dysuria. He has had 2 low grade fevers 99.2 on two separate occasions since admission to TCU. O2 levels >89% on RA.    He lives with his spouse and he is her primary caretaker. He tells me he has hired a company that provides PCAs to care for her while he is at the TCU     BP: 124-153/56-86 mmHg  P:  bpm    CODE STATUS/ADVANCE DIRECTIVES DISCUSSION:   CPR/Full code   Patient's living condition: lives with spouse    ALLERGIES:Penicillins; Sulfa drugs; and Ancef [cefazolin]  PAST MEDICAL HISTORY:  has a past medical history of Arthritis, Cerebral artery occlusion with cerebral infarction (H), Chronic rhinitis, Diabetes mellitus (H), HTN (hypertension), Hypertrophy of prostate with urinary obstruction and other lower urinary tract symptoms (LUTS), Sleep apnea, TIA (transient ischaemic attack) (1993), and Unspecified transient cerebral ischemia (1993). He also has no past medical history of Basal cell carcinoma, Chronic infection, Complication of anesthesia, History of blood transfusion, Malignant hyperthermia, or Squamous cell carcinoma.  PAST SURGICAL HISTORY:  has a past surgical history that includes NONSPECIFIC PROCEDURE (1985); NONSPECIFIC PROCEDURE (1987); NONSPECIFIC PROCEDURE; Colonoscopy (3/9/2013); hernia repair (7/13/2004); Foot surgery (4/2013); Amputate toe(s) (Left, 10/15/2018); Eye surgery (03/13/2017); and Arthroplasty knee (Right, 12/7/2018).  FAMILY HISTORY: family history includes Alcohol/Drug in his paternal grandfather; Cancer in his father; Diabetes in his maternal grandfather; Family History Negative in his mother.  SOCIAL HISTORY:  reports that he quit smoking about 25 years ago. he has never used smokeless tobacco. He reports that he drinks alcohol. He  reports that he does not use drugs.    Post Discharge Medication Reconciliation Status: discharge medications reconciled and changed, per note/orders (see AVS).  Current Outpatient Medications   Medication Sig Dispense Refill     acetaminophen (TYLENOL) 325 MG tablet Take 650 mg by mouth every 6 hours as needed for mild pain       aspirin (ASA) 325 MG EC tablet Take 1 tablet (325 mg) by mouth daily 90 tablet 3     blood glucose monitoring (ACCU-CHEK FASTCLIX) lancets 1 EACH BY IN VITRO ROUTE 2 TIMES DAILY 204 each 1     blood glucose monitoring (ACCU-CHEK SMARTVIEW) test strip USE TO TEST TWICE DAILY 200 strip 1     finasteride (PROSCAR) 5 MG tablet Take 5 mg by mouth daily.       fluticasone (FLONASE) 50 MCG/ACT spray Spray 1 spray into both nostrils 2 times daily       lisinopril (PRINIVIL/ZESTRIL) 10 MG tablet TAKE 2 TABLETS (20 MG) BY MOUTH DAILY  3     loratadine (CLARITIN) 10 MG tablet Take 10 mg by mouth At Bedtime       metFORMIN (GLUCOPHAGE) 1000 MG tablet TAKE 1 TABLET (1,000 MG) BY MOUTH 2 TIMES DAILY (WITH MEALS) **DUE FOR APRIL APPT/LABS. CALL MD** 180 tablet 1     Multiple Vitamins-Minerals (MULTIVITAMIN ADULTS PO) Take by mouth daily       tamsulosin (FLOMAX) 0.4 MG 24 hr capsule Take 1 capsule by mouth daily.       aspirin (ASA) 325 MG tablet Take 1 tablet (325 mg) by mouth daily         ROS:  10 point ROS of systems including Constitutional, Eyes, Respiratory, Cardiovascular, Gastroenterology, Genitourinary, Integumentary, Musculoskeletal, Neurological, Psychiatric were all negative except for pertinent positives noted in my HPI.    Exam:  /86   Pulse 104   Temp 99.2  F (37.3  C)   Resp 18   Ht 1.829 m (6')   Wt 114.9 kg (253 lb 3.2 oz)   SpO2 93%   BMI 34.34 kg/m    GENERAL APPEARANCE:  Alert, pleasant and cooperative, oriented x 4, sitting on edge of bed with legs dangling on exam today  EYES:  EOM, lids, pupils and irises normal, sclera clear and conjunctiva normal, no discharge or  mattering on lids or lashes noted  ENT:  Mouth normal, moist mucous membranes, nose without drainage or crusting, external ears without lesions, hearing acuity ok  RESP:  respiratory effort normal, no respiratory distress, Lung sounds clear, patient is on RA  CV: auscultation of heart done, rate tachycardic and rhythm regular. +1 bilateral pitting edema  ABDOMEN:  normal bowel sounds, soft, nontender, no grimacing or guarding with palpation.  M/S:   some  tenderness or swelling of the right knee joint; able to move all extremities with limited ROM to right knee due to pain  SKIN:  Inspection and palpation of skin and subcutaneous tissue: skin warm, dry without rashes, right knee with aquacel dressing intact with minimal drainage that is circled and no new drainage that has extended beyond markings  NEURO: cranial nerves 2-12 grossly intact, no facial asymmetry, no speech deficits and able to follow directions, moves all extremities symmetrically  PSYCH:  insight and judgement intact, memory intact, affect and mood normal      Lab/Diagnostic data:     CBC RESULTS:   Recent Labs   Lab Test 12/10/18  0627 12/09/18  0600  11/26/18  0946   WBC 8.8  --   --  8.3   RBC 3.26*  --   --  3.76*   HGB 9.3* 9.0*   < > 10.8*   HCT 30.6*  --   --  35.6*   MCV 94  --   --  95   MCH 28.5  --   --  28.7   MCHC 30.4*  --   --  30.3*   RDW 18.8*  --   --  18.6*     --   --  255    < > = values in this interval not displayed.       Last Basic Metabolic Panel:  Recent Labs   Lab Test 12/09/18 0600 12/08/18  0635 11/26/18  0946 11/05/18  0944   NA  --   --  144 139   POTASSIUM  --   --  4.7 4.7   CHLORIDE  --   --  111* 110*   SHANNAN  --   --  9.8 9.0   CO2  --   --  24 21   BUN  --   --  16 36*   CR  --   --  1.00 1.39*   * 112* 131* 138*       Lab Results   Component Value Date    A1C 6.4 10/17/2018    A1C 6.7 05/01/2018       ASSESSMENT/PLAN:  (M17.11) Osteoarthritis of right knee, unspecified osteoarthritis type  (primary  encounter diagnosis)  (Z96.651) Status post total right knee replacement  (R53.81) Physical deconditioning  Comment: Acute, reports pain is ok- no pain at rest, 10/10 with movement- would like to continue current regimen of asking for pain meds when he needs them- no s/sx of infection to surgical area  Plan: Start tylenol 650 mg po q6h PRN for pain. Continue oxycodone PRN. Continue  mg DVT prophy. Leave aquacel dressing intact. Follow up with Dr. Cunha in 10-14 days. Encourage participation in physical therapy/occupational therapy for strengthening and deconditioning. Discharge planning per their recommendation. Social work to assist with d/c planning.    (R00.0) Tachycardia  Comment: Acute, HR elevated since admission to TCU. Regular rhythm. Suspect related to pain- patient also with low grade temp- suspect likely atelectasis from surgery since without cough/SOB; also anemic from surgery could be contributing  Plan: Nursing to continue to monitor VS q4h. Notify provider if HR >110 or patient develops SOB, chest pain, or note that HR is irregular. Incentive spirometer q2h when awake    (D64.9) Anemia, unspecified type  Comment Acute on chronic, hgb 9.3 prior to discharge  -Baseline 11-12  Plan: Hgb 12/14. Monitor for s/sx of bleeding    (R60.0) Bilateral leg edema  Comment: Acute, per patient edema was worse this AM-suspect some of it is dependent as patient is working on computer and sitting on edge of bed- no CHF history per patient.  Plan: Start lymphedema therapy. Daily weights. Low Na diet.     (I10) Benign essential hypertension  Comment: Chronic, BP variable with little data since admission  Plan: BP goal <140/90. Continue PTA lisinopril. BMP 12/VS per TCU policy. .    (E11.8) Type 2 diabetes mellitus with complication, without long-term current use of insulin (H)  Comment: Chronic, last a1c meeting goal  Lab Results   Component Value Date    A1C 6.4 10/17/2018    A1C 6.7 05/01/2018    A1C 6.3  10/07/2017    A1C 6.3 05/15/2017    A1C 5.6 10/12/2016       Plan: Continue PTA metformin. Carb consistent diet.     (N40.0) Benign prostatic hyperplasia, unspecified whether lower urinary tract symptoms present  Comment: Chronic  Plan: Continue PTA tamsulosin.        Total time spent with patient visit at the skilled nursing facility was 49 minutes including patient visit, review of past records and medication reconcilation, review of admission orders. . Greater than 50% of total time spent with counseling and coordinating care due to OA, TKA and other problems as above    Electronically signed by:  RANJAN Butt CNP

## 2018-12-12 NOTE — LETTER
12/12/2018        RE: Lance Ibrahim  [address]  [place] [address]        [place] [facility]  PRIMARY CARE PROVIDER AND CLINIC:  Anh Spivey [address] / [place] [address]  Chief Complaint   Patient presents with     Hospital F/U     [place] Medical Record Number:  [id number]  Place of Service where encounter took place:  [facility]    HPI:    Lance Ibrahim is a 73 year old  (1945),admitted to the above facility from  [facility].  Hospital stay 12/7/18 through 12/11/18.  Admitted to this facility for  rehab, medical management and nursing care.  HPI information obtained from: facility chart records, facility staff, patient report and [facility] chart review.    Current issues are:   1. Osteoarthritis of right knee, unspecified osteoarthritis type    2. Status post total right knee replacement    3. Physical deconditioning    4. Tachycardia    5. Anemia, unspecified type    6. Bilateral leg edema    7. Benign essential hypertension    8. Type 2 diabetes mellitus with complication, without long-term current use of insulin (H)    9. Benign prostatic hyperplasia, unspecified whether lower urinary tract symptoms present         Patient was seen today for evaluation after being admitted to TCU following scheduled right total knee replacement on 12/7/18 for osteoarthritis. On exam today Lance is sitting up on the edge of his bed. He tells me he has no pain while sitting. He does report pain of 10/10 that is shooting in nature and runs up his right lateral thigh-cramping in nature with movement. He is asking for pain medications when he needs them and this is preferred. He will work with therapy while he is here. He tells me his hospital stay went well and no concerns were noted. This morning he tells me his legs were both edematous and he does not remember this from the hospital stay. He denies any chest pain, SOB. He is  tachycardic today and per chart review was also tachycardic in the hospital, although HR was running more in the 90s. He tells me his bowels are moving well- last BM yesterday. He does not want any stool softeners scheduled as he reports many episodes of diarrhea the last time he took one. Otherwise he denies SOB, cough, chest pain, dysuria. He has had 2 low grade fevers 99.2 on two separate occasions since admission to TCU. O2 levels >89% on RA.    He lives with his spouse and he is her primary caretaker. He tells me he has hired a company that provides PCAs to care for her while he is at the TCU     BP: 124-153/56-86 mmHg  P:  bpm    CODE STATUS/ADVANCE DIRECTIVES DISCUSSION:   CPR/Full code   Patient's living condition: lives with spouse    ALLERGIES:Penicillins; Sulfa drugs; and Ancef [cefazolin]  PAST MEDICAL HISTORY:  has a past medical history of Arthritis, Cerebral artery occlusion with cerebral infarction (H), Chronic rhinitis, Diabetes mellitus (H), HTN (hypertension), Hypertrophy of prostate with urinary obstruction and other lower urinary tract symptoms (LUTS), Sleep apnea, TIA (transient ischaemic attack) (1993), and Unspecified transient cerebral ischemia (1993). He also has no past medical history of Basal cell carcinoma, Chronic infection, Complication of anesthesia, History of blood transfusion, Malignant hyperthermia, or Squamous cell carcinoma.  PAST SURGICAL HISTORY:  has a past surgical history that includes NONSPECIFIC PROCEDURE (1985); NONSPECIFIC PROCEDURE (1987); NONSPECIFIC PROCEDURE; Colonoscopy (3/9/2013); hernia repair (7/13/2004); Foot surgery (4/2013); Amputate toe(s) (Left, 10/15/2018); Eye surgery (03/13/2017); and Arthroplasty knee (Right, 12/7/2018).  FAMILY HISTORY: family history includes Alcohol/Drug in his paternal grandfather; Cancer in his father; Diabetes in his maternal grandfather; Family History Negative in his mother.  SOCIAL HISTORY:  reports that he quit smoking  about 25 years ago. he has never used smokeless tobacco. He reports that he drinks alcohol. He reports that he does not use drugs.    Post Discharge Medication Reconciliation Status: discharge medications reconciled and changed, per note/orders (see AVS).  Current Outpatient Medications   Medication Sig Dispense Refill     acetaminophen (TYLENOL) 325 MG tablet Take 650 mg by mouth every 6 hours as needed for mild pain       aspirin (ASA) 325 MG EC tablet Take 1 tablet (325 mg) by mouth daily 90 tablet 3     blood glucose monitoring (ACCU-CHEK FASTCLIX) lancets 1 EACH BY IN VITRO ROUTE 2 TIMES DAILY 204 each 1     blood glucose monitoring (ACCU-CHEK SMARTVIEW) test strip USE TO TEST TWICE DAILY 200 strip 1     finasteride (PROSCAR) 5 MG tablet Take 5 mg by mouth daily.       fluticasone (FLONASE) 50 MCG/ACT spray Spray 1 spray into both nostrils 2 times daily       lisinopril (PRINIVIL/ZESTRIL) 10 MG tablet TAKE 2 TABLETS (20 MG) BY MOUTH DAILY  3     loratadine (CLARITIN) 10 MG tablet Take 10 mg by mouth At Bedtime       metFORMIN (GLUCOPHAGE) 1000 MG tablet TAKE 1 TABLET (1,000 MG) BY MOUTH 2 TIMES DAILY (WITH MEALS) **DUE FOR APRIL APPT/LABS. CALL MD** 180 tablet 1     Multiple Vitamins-Minerals (MULTIVITAMIN ADULTS PO) Take by mouth daily       tamsulosin (FLOMAX) 0.4 MG 24 hr capsule Take 1 capsule by mouth daily.       aspirin (ASA) 325 MG tablet Take 1 tablet (325 mg) by mouth daily         ROS:  10 point ROS of systems including Constitutional, Eyes, Respiratory, Cardiovascular, Gastroenterology, Genitourinary, Integumentary, Musculoskeletal, Neurological, Psychiatric were all negative except for pertinent positives noted in my HPI.    Exam:  /86   Pulse 104   Temp 99.2  F (37.3  C)   Resp 18   Ht 1.829 m (6')   Wt 114.9 kg (253 lb 3.2 oz)   SpO2 93%   BMI 34.34 kg/m     GENERAL APPEARANCE:  Alert, pleasant and cooperative, oriented x 4, sitting on edge of bed with legs dangling on exam  today  EYES:  EOM, lids, pupils and irises normal, sclera clear and conjunctiva normal, no discharge or mattering on lids or lashes noted  ENT:  Mouth normal, moist mucous membranes, nose without drainage or crusting, external ears without lesions, hearing acuity ok  RESP:  respiratory effort normal, no respiratory distress, Lung sounds clear, patient is on RA  CV: auscultation of heart done, rate tachycardic and rhythm regular. +1 bilateral pitting edema  ABDOMEN:  normal bowel sounds, soft, nontender, no grimacing or guarding with palpation.  M/S:   some  tenderness or swelling of the right knee joint; able to move all extremities with limited ROM to right knee due to pain  SKIN:  Inspection and palpation of skin and subcutaneous tissue: skin warm, dry without rashes, right knee with aquacel dressing intact with minimal drainage that is circled and no new drainage that has extended beyond markings  NEURO: cranial nerves 2-12 grossly intact, no facial asymmetry, no speech deficits and able to follow directions, moves all extremities symmetrically  PSYCH:  insight and judgement intact, memory intact, affect and mood normal      Lab/Diagnostic data:     CBC RESULTS:   Recent Labs   Lab Test 12/10/18  0627 12/09/18  0600  11/26/18  0946   WBC 8.8  --   --  8.3   RBC 3.26*  --   --  3.76*   HGB 9.3* 9.0*   < > 10.8*   HCT 30.6*  --   --  35.6*   MCV 94  --   --  95   MCH 28.5  --   --  28.7   MCHC 30.4*  --   --  30.3*   RDW 18.8*  --   --  18.6*     --   --  255    < > = values in this interval not displayed.       Last Basic Metabolic Panel:  Recent Labs   Lab Test 12/09/18  0600 12/08/18  0635 11/26/18  0946 11/05/18  0944   NA  --   --  144 139   POTASSIUM  --   --  4.7 4.7   CHLORIDE  --   --  111* 110*   SHANNAN  --   --  9.8 9.0   CO2  --   --  24 21   BUN  --   --  16 36*   CR  --   --  1.00 1.39*   * 112* 131* 138*       Lab Results   Component Value Date    A1C 6.4 10/17/2018    A1C 6.7 05/01/2018        ASSESSMENT/PLAN:  (M17.11) Osteoarthritis of right knee, unspecified osteoarthritis type  (primary encounter diagnosis)  (Z96.651) Status post total right knee replacement  (R53.81) Physical deconditioning  Comment: Acute, reports pain is ok- no pain at rest, 10/10 with movement- would like to continue current regimen of asking for pain meds when he needs them- no s/sx of infection to surgical area  Plan: Start tylenol 650 mg po q6h PRN for pain. Continue oxycodone PRN. Continue  mg DVT prophy. Leave aquacel dressing intact. Follow up with Dr. Cunha in 10-14 days. Encourage participation in physical therapy/occupational therapy for strengthening and deconditioning. Discharge planning per their recommendation. Social work to assist with d/c planning.    (R00.0) Tachycardia  Comment: Acute, HR elevated since admission to TCU. Regular rhythm. Suspect related to pain- patient also with low grade temp- suspect likely atelectasis from surgery since without cough/SOB; also anemic from surgery could be contributing  Plan: Nursing to continue to monitor VS q4h. Notify provider if HR >110 or patient develops SOB, chest pain, or note that HR is irregular. Incentive spirometer q2h when awake    (D64.9) Anemia, unspecified type  Comment Acute on chronic, hgb 9.3 prior to discharge  -Baseline 11-12  Plan: Hgb 12/14. Monitor for s/sx of bleeding    (R60.0) Bilateral leg edema  Comment: Acute, per patient edema was worse this AM-suspect some of it is dependent as patient is working on computer and sitting on edge of bed- no CHF history per patient.  Plan: Start lymphedema therapy. Daily weights. Low Na diet.     (I10) Benign essential hypertension  Comment: Chronic, BP variable with little data since admission  Plan: BP goal <140/90. Continue PTA lisinopril. BMP 12/VS per TCU policy. .    (E11.8) Type 2 diabetes mellitus with complication, without long-term current use of insulin (H)  Comment: Chronic, last a1c  meeting goal  Lab Results   Component Value Date    A1C 6.4 10/17/2018    A1C 6.7 05/01/2018    A1C 6.3 10/07/2017    A1C 6.3 05/15/2017    A1C 5.6 10/12/2016       Plan: Continue PTA metformin. Carb consistent diet.     (N40.0) Benign prostatic hyperplasia, unspecified whether lower urinary tract symptoms present  Comment: Chronic  Plan: Continue PTA tamsulosin.        Total time spent with patient visit at the [facility] nursing [facility] was 49 minutes including patient visit, review of past records and medication reconcilation, review of admission orders. . Greater than 50% of total time spent with counseling and coordinating care due to OA, TKA and other problems as above    Electronically signed by:  RANJAN Butt CNP                    Sincerely,        RANJAN Butt CNP

## 2018-12-13 ENCOUNTER — RECORDS - HEALTHEAST (OUTPATIENT)
Dept: LAB | Facility: CLINIC | Age: 73
End: 2018-12-13

## 2018-12-14 LAB
ANION GAP SERPL CALCULATED.3IONS-SCNC: 8 MMOL/L (ref 5–18)
BUN SERPL-MCNC: 17 MG/DL (ref 8–28)
CALCIUM SERPL-MCNC: 9.1 MG/DL (ref 8.5–10.5)
CHLORIDE BLD-SCNC: 104 MMOL/L (ref 98–107)
CO2 SERPL-SCNC: 27 MMOL/L (ref 22–31)
CREAT SERPL-MCNC: 0.97 MG/DL (ref 0.7–1.3)
GFR SERPL CREATININE-BSD FRML MDRD: >60 ML/MIN/1.73M2
GLUCOSE BLD-MCNC: 125 MG/DL (ref 70–125)
HGB BLD-MCNC: 8.9 G/DL (ref 14–18)
POTASSIUM BLD-SCNC: 4.5 MMOL/L (ref 3.5–5)
SODIUM SERPL-SCNC: 139 MMOL/L (ref 136–145)

## 2018-12-14 RX ORDER — ACETAMINOPHEN 325 MG/1
650 TABLET ORAL EVERY 6 HOURS PRN
COMMUNITY
End: 2019-02-17

## 2018-12-17 ENCOUNTER — TRANSFERRED RECORDS (OUTPATIENT)
Dept: HEALTH INFORMATION MANAGEMENT | Facility: CLINIC | Age: 73
End: 2018-12-17

## 2018-12-18 ENCOUNTER — NURSING HOME VISIT (OUTPATIENT)
Dept: GERIATRICS | Facility: CLINIC | Age: 73
End: 2018-12-18
Payer: COMMERCIAL

## 2018-12-18 VITALS
DIASTOLIC BLOOD PRESSURE: 86 MMHG | WEIGHT: 247.8 LBS | HEIGHT: 72 IN | OXYGEN SATURATION: 97 % | BODY MASS INDEX: 33.56 KG/M2 | HEART RATE: 102 BPM | TEMPERATURE: 98.9 F | RESPIRATION RATE: 16 BRPM | SYSTOLIC BLOOD PRESSURE: 155 MMHG

## 2018-12-18 DIAGNOSIS — Z96.651 STATUS POST TOTAL RIGHT KNEE REPLACEMENT: ICD-10-CM

## 2018-12-18 DIAGNOSIS — M17.11 OSTEOARTHRITIS OF RIGHT KNEE, UNSPECIFIED OSTEOARTHRITIS TYPE: Primary | ICD-10-CM

## 2018-12-18 PROCEDURE — 99308 SBSQ NF CARE LOW MDM 20: CPT | Performed by: NURSE PRACTITIONER

## 2018-12-18 ASSESSMENT — MIFFLIN-ST. JEOR: SCORE: 1902.01

## 2018-12-18 NOTE — PROGRESS NOTES
[place] [facility]    Chief Complaint   Patient presents with     skilled nursing Acute       [place] Medical Record Number:  [id number]  Place of Service where encounter took place:  [facility] ([facility]) [[id number]]    HPI:    Lance Ibrahim is a 73 year old  (1945), who is being seen today for an episodic care visit.  HPI information obtained from: facility chart records, facility staff, patient report and [facility] chart review. Today's concern is:    1. Osteoarthritis of right knee, unspecified osteoarthritis type    2. Status post total right knee replacement      Patient asked to see provider today to discuss topical medication for muscle aches on right thigh, calf. He otherwise feels current pain regimen is working well. He has used salon pas at home before, but is interested in voltaren gel after discussion.     BP: 108-166/56-86 mmHg  P:  bpm  Wt Readings from Last 5 Encounters:   12/18/18 112.4 kg (247 lb 12.8 oz)   12/12/18 114.9 kg (253 lb 3.2 oz)   12/07/18 117 kg (258 lb)   11/26/18 118.1 kg (260 lb 4.8 oz)   11/08/18 112.5 kg (248 lb 1.6 oz)       REVIEW OF SYSTEMS:  4 point ROS of systems including Constitutional, Integumentary, Musculoskeletal, Neurological were all negative except for pertinent positives noted in my HPI.    /86   Pulse 102   Temp 98.9  F (37.2  C)   Resp 16   Ht 1.829 m (6')   Wt 112.4 kg (247 lb 12.8 oz)   SpO2 97%   BMI 33.61 kg/m    GENERAL APPEARANCE:  Alert, in no distress, sitting on edge of bed  EYES:  EOM, lids, pupils and irises normal, sclera clear and conjunctiva normal, no discharge or mattering on lids or lashes noted  ENT:  Mouth normal, moist mucous membranes, nose without drainage or crusting, external ears without lesions, hearing acuity ok  M/S:   some tenderness or swelling of the right knee joint and muscle aches to lateral right thigh and calf; able to move all extremities with limited ROM to right knee  due to pain  SKIN:  Inspection and palpation of skin and subcutaneous tissue: skin warm, dry without rashes, right knee with aquacel dressing intact, with some edema to RLE around knee- some healing- yellow bruises present  NEURO: cranial nerves 2-12 grossly intact, no facial asymmetry, no speech deficits and able to follow directions, moves all extremities symmetrically      ASSESSMENT/PLAN:  (M17.11) Osteoarthritis of right knee, unspecified osteoarthritis type  (primary encounter diagnosis)  (Z96.651) Status post total right knee replacement  Comment: Acute, reports pain is ok except for some muscle aches to right lateral thigh, right lateral calf  Plan: Continue tylenol 650 mg po q6h PRN for pain. Continue oxycodone PRN. Continue  mg DVT prophy. Start voltaren gel QID to muscles Leave aquacel dressing intact. Follow up with Dr. Cunha in 10-14 days- appointment has not been scheduled yet.         Electronically signed by:  RANJAN Butt CNP

## 2018-12-18 NOTE — LETTER
12/18/2018        RE: Lance Ibrahim  [address]  [place] [address]        [place] [facility]    Chief Complaint   Patient presents with     alf Acute       [place] Medical Record Number:  [id number]  Place of Service where encounter took place:  [facility] ([facility] [[id number]]    HPI:    Lance Ibrahim is a 73 year old  (1945), who is being seen today for an episodic care visit.  HPI information obtained from: facility chart records, facility staff, patient report and [facility] chart review. Today's concern is:    1. Osteoarthritis of right knee, unspecified osteoarthritis type    2. Status post total right knee replacement      Patient asked to see provider today to discuss topical medication for muscle aches on right thigh, calf. He otherwise feels current pain regimen is working well. He has used salon pas at home before, but is interested in voltaren gel after discussion.     BP: 108-166/56-86 mmHg  P:  bpm  Wt Readings from Last 5 Encounters:   12/18/18 112.4 kg (247 lb 12.8 oz)   12/12/18 114.9 kg (253 lb 3.2 oz)   12/07/18 117 kg (258 lb)   11/26/18 118.1 kg (260 lb 4.8 oz)   11/08/18 112.5 kg (248 lb 1.6 oz)       REVIEW OF SYSTEMS:  4 point ROS of systems including Constitutional, Integumentary, Musculoskeletal, Neurological were all negative except for pertinent positives noted in my HPI.    /86   Pulse 102   Temp 98.9  F (37.2  C)   Resp 16   Ht 1.829 m (6')   Wt 112.4 kg (247 lb 12.8 oz)   SpO2 97%   BMI 33.61 kg/m     GENERAL APPEARANCE:  Alert, in no distress, sitting on edge of bed  EYES:  EOM, lids, pupils and irises normal, sclera clear and conjunctiva normal, no discharge or mattering on lids or lashes noted  ENT:  Mouth normal, moist mucous membranes, nose without drainage or crusting, external ears without lesions, hearing acuity ok  M/S:   some tenderness or swelling of the right knee joint  and muscle aches  to lateral right thigh and calf; able to move all extremities with limited ROM to right knee due to pain  SKIN:  Inspection and palpation of skin and subcutaneous tissue: skin warm, dry without rashes, right knee with aquacel dressing intact, with some edema to RLE around knee- some healing- yellow bruises present  NEURO: cranial nerves 2-12 grossly intact, no facial asymmetry, no speech deficits and able to follow directions, moves all extremities symmetrically      ASSESSMENT/PLAN:  (M17.11) Osteoarthritis of right knee, unspecified osteoarthritis type  (primary encounter diagnosis)  (Z96.651) Status post total right knee replacement  Comment: Acute, reports pain is ok except for some muscle aches to right lateral thigh, right lateral calf  Plan:  Continue tylenol 650 mg po q6h PRN for pain. Continue oxycodone PRN. Continue  mg DVT prophy. Start voltaren gel QID to muscles Leave aquacel dressing intact. Follow up with Dr. Cunha in 10-14 days- appointment has not been scheduled yet.         Electronically signed by:  RANJAN Butt CNP      Sincerely,        RANJAN Butt CNP

## 2018-12-19 ENCOUNTER — NURSING HOME VISIT (OUTPATIENT)
Dept: GERIATRICS | Facility: CLINIC | Age: 73
End: 2018-12-19
Payer: COMMERCIAL

## 2018-12-19 VITALS
WEIGHT: 247.6 LBS | HEART RATE: 102 BPM | SYSTOLIC BLOOD PRESSURE: 155 MMHG | BODY MASS INDEX: 33.54 KG/M2 | TEMPERATURE: 98.9 F | DIASTOLIC BLOOD PRESSURE: 86 MMHG | RESPIRATION RATE: 16 BRPM | OXYGEN SATURATION: 97 % | HEIGHT: 72 IN

## 2018-12-19 DIAGNOSIS — E11.59 TYPE 2 DIABETES MELLITUS WITH OTHER CIRCULATORY COMPLICATION, WITHOUT LONG-TERM CURRENT USE OF INSULIN (H): ICD-10-CM

## 2018-12-19 DIAGNOSIS — R53.81 PHYSICAL DECONDITIONING: ICD-10-CM

## 2018-12-19 DIAGNOSIS — N40.0 BENIGN PROSTATIC HYPERPLASIA, UNSPECIFIED WHETHER LOWER URINARY TRACT SYMPTOMS PRESENT: ICD-10-CM

## 2018-12-19 DIAGNOSIS — I10 BENIGN ESSENTIAL HYPERTENSION: ICD-10-CM

## 2018-12-19 DIAGNOSIS — Z96.651 STATUS POST TOTAL RIGHT KNEE REPLACEMENT: Primary | ICD-10-CM

## 2018-12-19 PROCEDURE — 99304 1ST NF CARE SF/LOW MDM 25: CPT | Performed by: INTERNAL MEDICINE

## 2018-12-19 ASSESSMENT — MIFFLIN-ST. JEOR: SCORE: 1901.11

## 2018-12-19 NOTE — LETTER
12/19/2018        RE: Lance Ibrahim  [address]  [place] [address]          PRIMARY CARE PROVIDER AND CLINIC RESPONSIBLE:  Anh Spivey, Fabio BENSONMICAELA [facility] / [place] [address]        ADMISSION HISTORY AND PHYSICAL EXAMINATION     Chief Complaint   Patient presents with     Hospital F/U         HISTORY OF PRESENT ILLNESS:  73 year old male, (1945), [facility].    Pt admitted [facility] 12/7 to 12/11 for R DJD s/p R TKA.    Pt denies any fevers/chills/chest pain/SOB. Pain in the knee is reasonably controlled.    Patient is seen and examined by me with Hilary Bonilla CNP. Please see Hilary Bonilla 's admit noted dated 12/12 for details of admission, past medical history, family history, allergies, medication list, social history and other details pertinent with this admission. Hospital admission and dc summary reviewed.      Past Medical History:   Diagnosis Date     Arthritis     osteoarthritis knees     Cerebral artery occlusion with cerebral infarction (H)     TIA 1993, no residual     Chronic rhinitis      Diabetes mellitus (H)      HTN (hypertension)      Hypertrophy of prostate with urinary obstruction and other lower urinary tract symptoms (LUTS)      Sleep apnea     doesn't use cpap     TIA (transient ischaemic attack) 1993     Unspecified transient cerebral ischemia 1993    No definite source found       Past Surgical History:   Procedure Laterality Date     AMPUTATE TOE(S) Left 10/15/2018    Procedure: AMPUTATE TOE(S);  Left third toe amputation;  Surgeon: Ayaka Azevedo DPM, Podiatry/Foot and Ankle Surgery;  Location: [facility] OR     ARTHROPLASTY KNEE Right 12/7/2018    Procedure: Right total knee arthroplasty;  Surgeon: Issa Cunha MD;  Location: [facility] OR     C NONSPECIFIC PROCEDURE  1985    Diastasis recti repair     C NONSPECIFIC PROCEDURE  1987    Ventral hernia repair     C NONSPECIFIC PROCEDURE      ORIF of left  "shoulder     COLONOSCOPY  3/9/2013    Procedure: COLONOSCOPY;  COLONOSCOPY;  Surgeon: Chau Hogan MD;  Location:  GI     EYE SURGERY  03/13/2017    left eye cataract     FOOT SURGERY  4/2013    cyst removal      HERNIA REPAIR  7/13/2004    ventral        Current Outpatient Medications   Medication Sig     acetaminophen (TYLENOL) 325 MG tablet Take 650 mg by mouth every 6 hours as needed for mild pain     aspirin (ASA) 325 MG tablet Take 1 tablet (325 mg) by mouth daily     blood glucose monitoring (ACCU-CHEK FASTCLIX) lancets 1 EACH BY IN VITRO ROUTE 2 TIMES DAILY     blood glucose monitoring (ACCU-CHEK SMARTVIEW) test strip USE TO TEST TWICE DAILY     diclofenac (VOLTAREN) 1 % topical gel Place 4 g onto the skin 4 times daily     finasteride (PROSCAR) 5 MG tablet Take 5 mg by mouth daily.     fluticasone (FLONASE) 50 MCG/ACT spray Spray 1 spray into both nostrils 2 times daily     lisinopril (PRINIVIL/ZESTRIL) 10 MG tablet TAKE 2 TABLETS (20 MG) BY MOUTH DAILY     loratadine (CLARITIN) 10 MG tablet Take 10 mg by mouth At Bedtime     metFORMIN (GLUCOPHAGE) 1000 MG tablet TAKE 1 TABLET (1,000 MG) BY MOUTH 2 TIMES DAILY (WITH MEALS) **DUE FOR APRIL APPT/LABS. CALL MD**     Multiple Vitamins-Minerals (MULTIVITAMIN ADULTS PO) Take by mouth daily     oxyCODONE HCl (OXAYDO) 5 MG TABA Take 5-10 mg by mouth every 4 hours as needed     tamsulosin (FLOMAX) 0.4 MG 24 hr capsule Take 1 capsule by mouth daily.     aspirin (ASA) 325 MG EC tablet Take 1 tablet (325 mg) by mouth daily (Patient not taking: Reported on 12/19/2018)     No current facility-administered medications for this visit.        Allergies   Allergen Reactions     Penicillins Anaphylaxis     \"anaphylactic\"     Sulfa Drugs      \"itchy rash, swelling of face and hands\"     Ancef [Cefazolin] Rash       Social History     Socioeconomic History     Marital status:      Spouse name: Not on file     Number of children: Not on file     Years of education: " Not on file     Highest education level: Not on file   Social Needs     Financial resource strain: Not on file     Food insecurity - worry: Not on file     Food insecurity - inability: Not on file     Transportation needs - medical: Not on file     Transportation needs - non-medical: Not on file   Occupational History     Employer: SELF   Tobacco Use     Smoking status: Former Smoker     Last attempt to quit: 3/17/1993     Years since quittin.7     Smokeless tobacco: Never Used   Substance and Sexual Activity     Alcohol use: Yes     Comment: Occ, Once a month     Drug use: No     Sexual activity: No   Other Topics Concern     Parent/sibling w/ CABG, MI or angioplasty before 65F 55M? Not Asked   Social History Narrative     Not on file          Information reviewed:  Medications, vital signs, orders, nursing notes, problem list, hospital information.     ROS: All 10 point review of system completed, those pertinent positive, please see H&P, the remaining ROS is negative.    /86   Pulse 102   Temp 98.9  F (37.2  C)   Resp 16   Ht 1.829 m (6')   Wt 112.3 kg (247 lb 9.6 oz)   SpO2 97%   BMI 33.58 kg/m       PHYSICAL EXAMINATION:   GENERAL:  No acute distress. Sitting in bed.  SKIN:  Dry and warm.  There is no rash, lesions, ulcers or juandice at area of skin examined.  HEENT:  Head without trauma.  Pupils round, reactive. Exam of conjunctiva and lids are normal. Sclera without icterus. There is no oral thrush.  NECK:  Supple.  There is no cervical adenopathy, no thyromegaly. No jugular venous distension.  CHEST: No reproducible chest tenderness.   LUNGS:  Normal respiratory effort. Lungs are Clear on ascultation.  HEART:  Regular rate and rhythm.  No murmur, gallops or rubs auscultated.  ABDOMEN:  Soft, bowel sounds positive.  There is no tenderness or guarding.   EXTREMITIES:1+ edema RLE.  NEUROLOGIC:  Alert and oriented x3.      Lab/Diagnostic data:  Reviewed      ASSESSMENT / PLAN:     Status post  total right knee replacement  - WBAT.  - Pain control.  - bowel regimen.  - On ASA for DVT ppx.  - f/u with ortho as scheduled.    Type 2 diabetes mellitus with other circulatory complication, without long-term current use of insulin (H)  - ADA diet.  - On metformin.    Benign prostatic hyperplasia, unspecified whether lower urinary tract symptoms present  - On finasteride and flomax.    Benign essential hypertension  - On lisinopril.    Physical deconditioning  -Plan: PT/OT, fall precautions. Care conference with patient and family for the progress of rehab and disposition issues will be discussed as planned. Rehab evaluation and other evaluations including CPT are at rehab logs, to be reviewed separately.  Fall risk assessment as well as cognitive evaluation will be formed during rehab stay if indicated.    Other problems with same care. Primary care doctor and other specialists to address those chronic problems in next clinic appointment to be scheduled upon discharge from the TCU.      Total time spent with patient visit was 25 min including patient visit, review of past records, 1/2 time on patients counseling and coordinating care.            Sincerely,        Minerva La MD

## 2018-12-19 NOTE — PROGRESS NOTES
PRIMARY CARE PROVIDER AND CLINIC RESPONSIBLE:  Anh Spivey, [address] [facility] / [facility] [place] [address]        ADMISSION HISTORY AND PHYSICAL EXAMINATION     Chief Complaint   Patient presents with     Hospital F/U         HISTORY OF PRESENT ILLNESS:  73 year old male, (1945), [facility] of medical care and rehab.    Pt admitted [facility] 12/7 to 12/11 for R DJD s/p R TKA.    Pt denies any fevers/chills/chest pain/SOB. Pain in the knee is reasonably controlled.    Patient is seen and examined by me with Hilary Bonilla CNP. Please see Hilary Bonilla 's admit noted dated 12/12 for details of admission, past medical history, family history, allergies, medication list, social history and other details pertinent with this admission. Hospital admission and dc summary reviewed.      Past Medical History:   Diagnosis Date     Arthritis     osteoarthritis knees     Cerebral artery occlusion with cerebral infarction (H)     TIA 1993, no residual     Chronic rhinitis      Diabetes mellitus (H)      HTN (hypertension)      Hypertrophy of prostate with urinary obstruction and other lower urinary tract symptoms (LUTS)      Sleep apnea     doesn't use cpap     TIA (transient ischaemic attack) 1993     Unspecified transient cerebral ischemia 1993    No definite source found       Past Surgical History:   Procedure Laterality Date     AMPUTATE TOE(S) Left 10/15/2018    Procedure: AMPUTATE TOE(S);  Left third toe amputation;  Surgeon: Ayaka Azevedo DPM, Podiatry/Foot and Ankle Surgery;  Location: [facility] OR     ARTHROPLASTY KNEE Right 12/7/2018    Procedure: Right total knee arthroplasty;  Surgeon: Issa Cunha MD;  Location: [facility] OR     C NONSPECIFIC PROCEDURE  1985    Diastasis recti repair     C NONSPECIFIC PROCEDURE  1987    Ventral hernia repair     C NONSPECIFIC PROCEDURE      ORIF of left shoulder     COLONOSCOPY  3/9/2013    Procedure: COLONOSCOPY;  COLONOSCOPY;  Surgeon: Edison  "Chau MORALES MD;  Location:  GI     EYE SURGERY  03/13/2017    left eye cataract     FOOT SURGERY  4/2013    cyst removal      HERNIA REPAIR  7/13/2004    ventral        Current Outpatient Medications   Medication Sig     acetaminophen (TYLENOL) 325 MG tablet Take 650 mg by mouth every 6 hours as needed for mild pain     aspirin (ASA) 325 MG tablet Take 1 tablet (325 mg) by mouth daily     blood glucose monitoring (ACCU-CHEK FASTCLIX) lancets 1 EACH BY IN VITRO ROUTE 2 TIMES DAILY     blood glucose monitoring (ACCU-CHEK SMARTVIEW) test strip USE TO TEST TWICE DAILY     diclofenac (VOLTAREN) 1 % topical gel Place 4 g onto the skin 4 times daily     finasteride (PROSCAR) 5 MG tablet Take 5 mg by mouth daily.     fluticasone (FLONASE) 50 MCG/ACT spray Spray 1 spray into both nostrils 2 times daily     lisinopril (PRINIVIL/ZESTRIL) 10 MG tablet TAKE 2 TABLETS (20 MG) BY MOUTH DAILY     loratadine (CLARITIN) 10 MG tablet Take 10 mg by mouth At Bedtime     metFORMIN (GLUCOPHAGE) 1000 MG tablet TAKE 1 TABLET (1,000 MG) BY MOUTH 2 TIMES DAILY (WITH MEALS) **DUE FOR APRIL APPT/LABS. CALL MD**     Multiple Vitamins-Minerals (MULTIVITAMIN ADULTS PO) Take by mouth daily     oxyCODONE HCl (OXAYDO) 5 MG TABA Take 5-10 mg by mouth every 4 hours as needed     tamsulosin (FLOMAX) 0.4 MG 24 hr capsule Take 1 capsule by mouth daily.     aspirin (ASA) 325 MG EC tablet Take 1 tablet (325 mg) by mouth daily (Patient not taking: Reported on 12/19/2018)     No current facility-administered medications for this visit.        Allergies   Allergen Reactions     Penicillins Anaphylaxis     \"anaphylactic\"     Sulfa Drugs      \"itchy rash, swelling of face and hands\"     Ancef [Cefazolin] Rash       Social History     Socioeconomic History     Marital status:      Spouse name: Not on file     Number of children: Not on file     Years of education: Not on file     Highest education level: Not on file   Social Needs     Financial resource " strain: Not on file     Food insecurity - worry: Not on file     Food insecurity - inability: Not on file     Transportation needs - medical: Not on file     Transportation needs - non-medical: Not on file   Occupational History     Employer: SELF   Tobacco Use     Smoking status: Former Smoker     Last attempt to quit: 3/17/1993     Years since quittin.7     Smokeless tobacco: Never Used   Substance and Sexual Activity     Alcohol use: Yes     Comment: Occ, Once a month     Drug use: No     Sexual activity: No   Other Topics Concern     Parent/sibling w/ CABG, MI or angioplasty before 65F 55M? Not Asked   Social History Narrative     Not on file          Information reviewed:  Medications, vital signs, orders, nursing notes, problem list, hospital information.     ROS: All 10 point review of system completed, those pertinent positive, please see H&P, the remaining ROS is negative.    /86   Pulse 102   Temp 98.9  F (37.2  C)   Resp 16   Ht 1.829 m (6')   Wt 112.3 kg (247 lb 9.6 oz)   SpO2 97%   BMI 33.58 kg/m      PHYSICAL EXAMINATION:   GENERAL:  No acute distress. Sitting in bed.  SKIN:  Dry and warm.  There is no rash, lesions, ulcers or juandice at area of skin examined.  HEENT:  Head without trauma.  Pupils round, reactive. Exam of conjunctiva and lids are normal. Sclera without icterus. There is no oral thrush.  NECK:  Supple.  There is no cervical adenopathy, no thyromegaly. No jugular venous distension.  CHEST: No reproducible chest tenderness.   LUNGS:  Normal respiratory effort. Lungs are Clear on ascultation.  HEART:  Regular rate and rhythm.  No murmur, gallops or rubs auscultated.  ABDOMEN:  Soft, bowel sounds positive.  There is no tenderness or guarding.   EXTREMITIES:1+ edema RLE.  NEUROLOGIC:  Alert and oriented x3.      Lab/Diagnostic data:  Reviewed      ASSESSMENT / PLAN:     Status post total right knee replacement  - WBAT.  - Pain control.  - bowel regimen.  - On ASA for DVT  ppx.  - f/u with ortho as scheduled.    Type 2 diabetes mellitus with other circulatory complication, without long-term current use of insulin (H)  - ADA diet.  - On metformin.    Benign prostatic hyperplasia, unspecified whether lower urinary tract symptoms present  - On finasteride and flomax.    Benign essential hypertension  - On lisinopril.    Physical deconditioning  -Plan: PT/OT, fall precautions. Care conference with patient and family for the progress of rehab and disposition issues will be discussed as planned. Rehab evaluation and other evaluations including CPT are at rehab logs, to be reviewed separately.  Fall risk assessment as well as cognitive evaluation will be formed during rehab stay if indicated.    Other problems with same care. Primary care doctor and other specialists to address those chronic problems in next clinic appointment to be scheduled upon discharge from the TCU.      Total time spent with patient visit was 25 min including patient visit, review of past records, 1/2 time on patients counseling and coordinating care.

## 2018-12-20 ENCOUNTER — DISCHARGE SUMMARY NURSING HOME (OUTPATIENT)
Dept: GERIATRICS | Facility: CLINIC | Age: 73
End: 2018-12-20
Payer: COMMERCIAL

## 2018-12-20 VITALS
HEIGHT: 72 IN | SYSTOLIC BLOOD PRESSURE: 143 MMHG | HEART RATE: 89 BPM | DIASTOLIC BLOOD PRESSURE: 82 MMHG | BODY MASS INDEX: 33.54 KG/M2 | WEIGHT: 247.6 LBS | OXYGEN SATURATION: 97 % | TEMPERATURE: 99.4 F | RESPIRATION RATE: 16 BRPM

## 2018-12-20 DIAGNOSIS — D64.9 ANEMIA, UNSPECIFIED TYPE: ICD-10-CM

## 2018-12-20 DIAGNOSIS — E11.8 TYPE 2 DIABETES MELLITUS WITH COMPLICATION, WITHOUT LONG-TERM CURRENT USE OF INSULIN (H): ICD-10-CM

## 2018-12-20 DIAGNOSIS — M17.11 OSTEOARTHRITIS OF RIGHT KNEE, UNSPECIFIED OSTEOARTHRITIS TYPE: Primary | ICD-10-CM

## 2018-12-20 DIAGNOSIS — Z96.651 STATUS POST TOTAL RIGHT KNEE REPLACEMENT: ICD-10-CM

## 2018-12-20 DIAGNOSIS — R00.0 TACHYCARDIA: ICD-10-CM

## 2018-12-20 DIAGNOSIS — N40.0 BENIGN PROSTATIC HYPERPLASIA, UNSPECIFIED WHETHER LOWER URINARY TRACT SYMPTOMS PRESENT: ICD-10-CM

## 2018-12-20 DIAGNOSIS — R53.81 PHYSICAL DECONDITIONING: ICD-10-CM

## 2018-12-20 DIAGNOSIS — I10 BENIGN ESSENTIAL HYPERTENSION: ICD-10-CM

## 2018-12-20 DIAGNOSIS — R60.0 BILATERAL LEG EDEMA: ICD-10-CM

## 2018-12-20 PROCEDURE — 99316 NF DSCHRG MGMT 30 MIN+: CPT | Performed by: NURSE PRACTITIONER

## 2018-12-20 ASSESSMENT — MIFFLIN-ST. JEOR: SCORE: 1901.11

## 2018-12-20 NOTE — PATIENT INSTRUCTIONS
[facility] Discharge Orders    Name: Lance Ibrahim  : 1945  Planned Discharge Date: 18  Discharged to: previous independent home    MEDICAL FOLLOW UP  Follow up with PCP in 1-2 weeks   Follow up with Specialists:Orthopedics 19 with Dr. Cunha at 10:30 AM      FUTURE LABS: Hgb should be checked at next follow up visit with PCP due to anemia post operatively     ORDER CHANGES:  Start voltaren gel for muscle pain .     DISCHARGE MEDICATIONS:  The patient s pharmacy is authorized to dispense a 30-day supply of medications. Refill requests should be directed to the primary provider, Anh Spivey.   Additional hard copy prescription for oxycodone medication left in facility chart for patient/family to fill at pharmacy of choice after discharge from facility.  Current Outpatient Medications   Medication Sig Dispense Refill     acetaminophen (TYLENOL) 325 MG tablet Take 650 mg by mouth every 6 hours as needed for mild pain       aspirin (ASA) 325 MG tablet Take 1 tablet (325 mg) by mouth daily       blood glucose monitoring (ACCU-CHEK FASTCLIX) lancets 1 EACH BY IN VITRO ROUTE 2 TIMES DAILY 204 each 1     blood glucose monitoring (ACCU-CHEK SMARTVIEW) test strip USE TO TEST TWICE DAILY 200 strip 1     diclofenac (VOLTAREN) 1 % topical gel Place 4 g onto the skin 4 times daily       finasteride (PROSCAR) 5 MG tablet Take 5 mg by mouth daily.       fluticasone (FLONASE) 50 MCG/ACT spray Spray 1 spray into both nostrils 2 times daily       lisinopril (PRINIVIL/ZESTRIL) 10 MG tablet TAKE 2 TABLETS (20 MG) BY MOUTH DAILY  3     loratadine (CLARITIN) 10 MG tablet Take 10 mg by mouth At Bedtime       metFORMIN (GLUCOPHAGE) 1000 MG tablet TAKE 1 TABLET (1,000 MG) BY MOUTH 2 TIMES DAILY (WITH MEALS) **DUE FOR APRIL APPT/LABS. CALL MD** 180 tablet 1     Multiple Vitamins-Minerals (MULTIVITAMIN ADULTS PO) Take by mouth daily       oxyCODONE HCl (OXAYDO) 5 MG TABA Take 5-10 mg by mouth every  4 hours as needed       tamsulosin (FLOMAX) 0.4 MG 24 hr capsule Take 1 capsule by mouth daily.       aspirin (ASA) 325 MG EC tablet Take 1 tablet (325 mg) by mouth daily (Patient not taking: Reported on 12/19/2018) 90 tablet 3       SERVICES:  Home Care:  Occupational Therapy and Physical Therapy    ADDITIONAL INSTRUCTIONS:  Notify your surgeon if you have increased redness, swelling, tenderness, or drainage at your incision site.  Notify PCP if you have a fever greater than 100.5 degrees.  DO NOT DRIVE while taking narcotic pain medications.     RANJAN Butt CNP  This document was electronically signed on December 20, 2018

## 2018-12-20 NOTE — PROGRESS NOTES
"  [facility] DISCHARGE SUMMARY    PATIENT'S NAME: Lance Ibrahim  YOB: 1945  MEDICAL RECORD NUMBER:  [id number]  Place of Service where encounter took place:  [facility] [[id number]]    PRIMARY CARE PROVIDER AND CLINIC RESPONSIBLE AFTER TRANSFER: Anh Spivey [address] [facility] / [facility] [address]     TRANSFERRING PROVIDERS: RANJAN Butt CNP, Dr. Abhinav MD  DATE OF SNF ADMISSION:  December / 11 / 2018  DATE OF SNF (anticipated) DISCHARGE: December / 22 / 2018  DISCHARGE DISPOSITION: [facility] Provider   RECENT HOSPITALIZATION/ED:  [facility] stay 12/7/18 to 12/11/18.     CODE STATUS/ADVANCE DIRECTIVES DISCUSSION:   CPR/Full code      Allergies   Allergen Reactions     Penicillins Anaphylaxis     \"anaphylactic\"     Sulfa Drugs      \"itchy rash, swelling of face and hands\"     Ancef [Cefazolin] Rash     Condition on Discharge:  Improving.  Function:  Independent with ADLs, 17 stairs, walking >250 feet with 2WW  Cognitive Scores: BIMS 15/15 and SLUMS 28/30    Equipment: walker    DISCHARGE DIAGNOSIS:   1. Osteoarthritis of right knee, unspecified osteoarthritis type    2. Status post total right knee replacement    3. Physical deconditioning    4. Anemia, unspecified type    5. Bilateral leg edema    6. Benign essential hypertension    7. Type 2 diabetes mellitus with complication, without long-term current use of insulin (H)    8. Benign prostatic hyperplasia, unspecified whether lower urinary tract symptoms present    9. Tachycardia        HPI Nursing Facility Course:  HPI information obtained from: facility chart records, facility staff, patient report and [facility] chart review.    (M17.11) Osteoarthritis of right knee, unspecified osteoarthritis type  (primary encounter diagnosis)  (Z96.651) Status post total right knee replacement  (R53.81) Physical deconditioning  Comment: Acute, pain is well managed- is " having some occasional muscle pain -feels voltaren gel is working well. Is using about 2 doses of oxycodone 10 mg daily. no s/sx of infection to surgical area  Plan: Continue tylenol 650 mg po q6h PRN for pain. Continue oxycodone PRN- will give patient short supply. Continue voltaren gel QID. Continue  mg DVT prophy. Follow up with Dr. Cunha as scheduled January 17. He will be set up with OCCUPATIONAL THERAPY and PHYSICAL THERAPY through [facility] at discharge.     (D64.9) Anemia, unspecified type  Comment Acute on chronic, hgb 8.9 during stay at TCU  -Baseline 11-12  Plan: Would recommend rechecking Hgb at follow up visit to be sure it is continuing to improve.      (R60.0) Bilateral leg edema  Comment: Acute, developed significant edema to BLE upon admission to TCU. Lymphedema therapy provided services to patient. On discharge visit today edema is much improved. He will continue tubi  at home. Suspect it is related to surgery and also has a dependent component as patient is working on computer and sitting on edge of bed- no CHF history per patient.  Plan: Continue tubi  at home. Continue to monitor.       I10) Benign essential hypertension  Comment: Chronic, BP with some elevations likely due to pain  Plan: BP goal <140/90. Continue PTA lisinopril.     (E11.8) Type 2 diabetes mellitus with complication, without long-term current use of insulin (H)  Comment: Chronic, last a1c meeting goal        Lab Results   Component Value Date     A1C 6.4 10/17/2018     A1C 6.7 05/01/2018     A1C 6.3 10/07/2017     A1C 6.3 05/15/2017     A1C 5.6 10/12/2016         Plan: Continue PTA metformin. Carb consistent diet.      (N40.0) Benign prostatic hyperplasia, unspecified whether lower urinary tract symptoms present  Comment: Chronic  Plan: Continue PTA tamsulosin.    (R00.0) Tachycardia  Comment: Acute, HR elevated at times since admission to TCU. Regular rhythm. Suspect related to pain- patient also  had some occasional low grade temps- suspect likely atelectasis from surgery since without cough/SOB. Follow up with PCP for resolution.       BP: 108-171/60-86 mmHg  P:  bpm  Wt Readings from Last 5 Encounters:   12/20/18 112.3 kg (247 lb 9.6 oz)   12/19/18 112.3 kg (247 lb 9.6 oz)   12/18/18 112.4 kg (247 lb 12.8 oz)   12/12/18 114.9 kg (253 lb 3.2 oz)   12/07/18 117 kg (258 lb)       PAST MEDICAL HISTORY:  has a past medical history of Arthritis, Cerebral artery occlusion with cerebral infarction (H), Chronic rhinitis, Diabetes mellitus (H), HTN (hypertension), Hypertrophy of prostate with urinary obstruction and other lower urinary tract symptoms (LUTS), Sleep apnea, TIA (transient ischaemic attack) (1993), and Unspecified transient cerebral ischemia (1993). He also has no past medical history of Basal cell carcinoma, Chronic infection, Complication of anesthesia, History of blood transfusion, Malignant hyperthermia, or Squamous cell carcinoma.    DISCHARGE MEDICATIONS:  Current Outpatient Medications   Medication Sig Dispense Refill     acetaminophen (TYLENOL) 325 MG tablet Take 650 mg by mouth every 6 hours as needed for mild pain       aspirin (ASA) 325 MG tablet Take 1 tablet (325 mg) by mouth daily       blood glucose monitoring (ACCU-CHEK FASTCLIX) lancets 1 EACH BY IN VITRO ROUTE 2 TIMES DAILY 204 each 1     blood glucose monitoring (ACCU-CHEK SMARTVIEW) test strip USE TO TEST TWICE DAILY 200 strip 1     diclofenac (VOLTAREN) 1 % topical gel Place 4 g onto the skin 4 times daily       finasteride (PROSCAR) 5 MG tablet Take 5 mg by mouth daily.       fluticasone (FLONASE) 50 MCG/ACT spray Spray 1 spray into both nostrils 2 times daily       lisinopril (PRINIVIL/ZESTRIL) 10 MG tablet TAKE 2 TABLETS (20 MG) BY MOUTH DAILY  3     loratadine (CLARITIN) 10 MG tablet Take 10 mg by mouth At Bedtime       metFORMIN (GLUCOPHAGE) 1000 MG tablet TAKE 1 TABLET (1,000 MG) BY MOUTH 2 TIMES DAILY (WITH MEALS) **DUE  FOR APRIL APPT/LABS. CALL MD** 180 tablet 1     Multiple Vitamins-Minerals (MULTIVITAMIN ADULTS PO) Take by mouth daily       oxyCODONE HCl (OXAYDO) 5 MG TABA Take 5-10 mg by mouth every 4 hours as needed       tamsulosin (FLOMAX) 0.4 MG 24 hr capsule Take 1 capsule by mouth daily.       aspirin (ASA) 325 MG EC tablet Take 1 tablet (325 mg) by mouth daily (Patient not taking: Reported on 12/19/2018) 90 tablet 3       MEDICATION CHANGES/RATIONALE:   Started voltaren gel for pain.    Controlled medications sent with patient:   Script for 30 medication for oxycodone tabs and 0 refills given to patient at dischage to have them fill at their out patient pharmacy     ROS:    10 point ROS of systems including Constitutional, Eyes, Respiratory, Cardiovascular, Gastroenterology, Genitourinary, Integumentary, Musculoskeletal, Neurological, Psychiatric were all negative except for pertinent positives noted in my HPI.    Physical Exam:   Vitals: /82   Pulse 89   Temp 99.4  F (37.4  C)   Resp 16   Ht 1.829 m (6')   Wt 112.3 kg (247 lb 9.6 oz)   SpO2 97%   BMI 33.58 kg/m    BMI= Body mass index is 33.58 kg/m .  GENERAL APPEARANCE:  Alert, pleasant and cooperative, oriented x 4, sitting on edge of bed with legs dangling on exam today  EYES:  EOM, lids, pupils and irises normal, sclera clear and conjunctiva normal, no discharge or mattering on lids or lashes noted  ENT:  Mouth normal, moist mucous membranes, nose without drainage or crusting, external ears without lesions, hearing acuity ok  RESP:  respiratory effort normal, no respiratory distress, Lung sounds clear, patient is on RA  CV: auscultation of heart done, rate and rhythm regular. Generalized edema to BLE  ABDOMEN:  normal bowel sounds, soft, nontender, no grimacing or guarding with palpation.  M/S:   some  tenderness or swelling of the right knee joint; able to move all extremities with limited ROM to right knee due to pain  SKIN:  Inspection and palpation  of skin and subcutaneous tissue: skin warm, dry without rashes, right knee with aquacel dressing intact with minimal drainage that is circled and no new drainage that has extended beyond markings  NEURO: cranial nerves 2-12 grossly intact, no facial asymmetry, no speech deficits and able to follow directions, moves all extremities symmetrically  PSYCH:  insight and judgement intact, memory intact, affect and mood normal    DISCHARGE PLAN: Occupational Therapy, Physical Therapy and From:  [facility] to start on 12/31/18 per patient preference  Patient instructed to follow-up with:  PCP in 7 days        MTM referral needed and placed by this provider: No    Pending labs: None  SNF labs     CBC RESULTS:   Recent Labs   Lab Test 12/10/18  0627 12/09/18  0600 11/26/18  0946   WBC 8.8  --   --  8.3   RBC 3.26*  --   --  3.76*   HGB 9.3* 9.0*   < > 10.8*   HCT 30.6*  --   --  35.6*   MCV 94  --   --  95   MCH 28.5  --   --  28.7   MCHC 30.4*  --   --  30.3*   RDW 18.8*  --   --  18.6*     --   --  255    < > = values in this interval not displayed.       Last Basic Metabolic Panel:  Recent Labs   Lab Test 12/09/18 0600 12/08/18  0635 11/26/18  0946 11/05/18  0944   NA  --   --  144 139   POTASSIUM  --   --  4.7 4.7   CHLORIDE  --   --  111* 110*   SHANNAN  --   --  9.8 9.0   CO2  --   --  24 21   BUN  --   --  16 36*   CR  --   --  1.00 1.39*   * 112* 131* 138*       Lab Results   Component Value Date    A1C 6.4 10/17/2018    A1C 6.7 05/01/2018       Discharge Treatments:  Follow up with PCP, orthopedic surgeon as scheduled  Home care services as above    TOTAL DISCHARGE TIME:   Greater than 30 minutes  Electronically signed by:  RANJAN Butt CNP

## 2018-12-20 NOTE — LETTER
"    12/20/2018        RE: Lance Ibrahim  [address] [place] [address]              S  [facility] DISCHARGE SUMMARY    PATIENT'S NAME: Lance Ibrahim  YOB: 1945  MEDICAL RECORD NUMBER:  [id number]  Place of Service where encounter took place:  [facility] ([facility] [[id number]]    PRIMARY CARE PROVIDER AND CLINIC RESPONSIBLE AFTER TRANSFER: Fuller, Sherri Rubin [address] [facility] / [place] [address]     TRANSFERRING PROVIDERS: RANJAN Butt CNP, Dr. Abhinav MD  DATE OF SNF ADMISSION:  December / 11 / 2018  DATE OF SNF (anticipated) DISCHARGE: December / 22 / 2018  DISCHARGE DISPOSITION: [facility] Provider   RECENT HOSPITALIZATION/ED:  [facility] stay 12/7/18 to 12/11/18.     CODE STATUS/ADVANCE DIRECTIVES DISCUSSION:   CPR/Full code      Allergies   Allergen Reactions     Penicillins Anaphylaxis     \"anaphylactic\"     Sulfa Drugs      \"itchy rash, swelling of face and hands\"     Ancef [Cefazolin] Rash     Condition on Discharge:  Improving.  Function:  Independent with ADLs, 17 stairs, walking >250 feet with 2WW  Cognitive Scores: BIMS 15/15 and SLUMS 28/30    Equipment: walker    DISCHARGE DIAGNOSIS:   1. Osteoarthritis of right knee, unspecified osteoarthritis type    2. Status post total right knee replacement    3. Physical deconditioning    4. Anemia, unspecified type    5. Bilateral leg edema    6. Benign essential hypertension    7. Type 2 diabetes mellitus with complication, without long-term current use of insulin (H)    8. Benign prostatic hyperplasia, unspecified whether lower urinary tract symptoms present    9. Tachycardia        HPI Nursing Facility Course:  HPI information obtained from: facility chart records, facility staff, patient report and [facility] chart review.    (M17.11) Osteoarthritis of right knee, unspecified osteoarthritis type  (primary encounter diagnosis)  (Z96.651) Status post total " right knee replacement  (R53.81) Physical deconditioning  Comment: Acute,  pain is well managed- is having some occasional muscle pain -feels voltaren gel is working well. Is using about 2 doses of oxycodone 10 mg daily. no s/sx of infection to surgical area  Plan:  Continue tylenol 650 mg po q6h PRN for pain. Continue oxycodone PRN- will give patient short supply. Continue voltaren gel QID. Continue  mg DVT prophy. Follow up with Dr. Cunha as scheduled January 17. He will be set up with OCCUPATIONAL THERAPY and PHYSICAL THERAPY through [facility] at discharge.     (D64.9) Anemia, unspecified type  Comment Acute on chronic, hgb 8.9 during stay at TCU  -Baseline 11-12  Plan: Would recommend rechecking Hgb at follow up visit to be sure it is continuing to improve.      (R60.0) Bilateral leg edema  Comment: Acute, developed significant edema to BLE upon admission to TCU. Lymphedema therapy provided services to patient. On discharge visit today edema is much improved. He will continue tubi  at home. Suspect it is related to surgery and also has a dependent component as patient is working on computer and sitting on edge of bed- no CHF history per patient.  Plan: Continue tubi  at home. Continue to monitor.       I10) Benign essential hypertension  Comment: Chronic, BP with some elevations likely due to pain  Plan: BP goal <140/90. Continue PTA lisinopril.     (E11.8) Type 2 diabetes mellitus with complication, without long-term current use of insulin (H)  Comment: Chronic, last a1c meeting goal        Lab Results   Component Value Date     A1C 6.4 10/17/2018     A1C 6.7 05/01/2018     A1C 6.3 10/07/2017     A1C 6.3 05/15/2017     A1C 5.6 10/12/2016         Plan: Continue PTA metformin. Carb consistent diet.      (N40.0) Benign prostatic hyperplasia, unspecified whether lower urinary tract symptoms present  Comment: Chronic  Plan: Continue PTA tamsulosin.    (R00.0) Tachycardia  Comment: Acute,  HR elevated at times since admission to TCU. Regular rhythm. Suspect related to pain- patient also had some occasional low grade temps- suspect likely atelectasis from surgery since without cough/SOB. Follow up with PCP for resolution.       BP: 108-171/60-86 mmHg  P:  bpm  Wt Readings from Last 5 Encounters:   12/20/18 112.3 kg (247 lb 9.6 oz)   12/19/18 112.3 kg (247 lb 9.6 oz)   12/18/18 112.4 kg (247 lb 12.8 oz)   12/12/18 114.9 kg (253 lb 3.2 oz)   12/07/18 117 kg (258 lb)       PAST MEDICAL HISTORY:  has a past medical history of Arthritis, Cerebral artery occlusion with cerebral infarction (H), Chronic rhinitis, Diabetes mellitus (H), HTN (hypertension), Hypertrophy of prostate with urinary obstruction and other lower urinary tract symptoms (LUTS), Sleep apnea, TIA (transient ischaemic attack) (1993), and Unspecified transient cerebral ischemia (1993). He also has no past medical history of Basal cell carcinoma, Chronic infection, Complication of anesthesia, History of blood transfusion, Malignant hyperthermia, or Squamous cell carcinoma.    DISCHARGE MEDICATIONS:  Current Outpatient Medications   Medication Sig Dispense Refill     acetaminophen (TYLENOL) 325 MG tablet Take 650 mg by mouth every 6 hours as needed for mild pain       aspirin (ASA) 325 MG tablet Take 1 tablet (325 mg) by mouth daily       blood glucose monitoring (ACCU-CHEK FASTCLIX) lancets 1 EACH BY IN VITRO ROUTE 2 TIMES DAILY 204 each 1     blood glucose monitoring (ACCU-CHEK SMARTVIEW) test strip USE TO TEST TWICE DAILY 200 strip 1     diclofenac (VOLTAREN) 1 % topical gel Place 4 g onto the skin 4 times daily       finasteride (PROSCAR) 5 MG tablet Take 5 mg by mouth daily.       fluticasone (FLONASE) 50 MCG/ACT spray Spray 1 spray into both nostrils 2 times daily       lisinopril (PRINIVIL/ZESTRIL) 10 MG tablet TAKE 2 TABLETS (20 MG) BY MOUTH DAILY  3     loratadine (CLARITIN) 10 MG tablet Take 10 mg by mouth At Bedtime        metFORMIN (GLUCOPHAGE) 1000 MG tablet TAKE 1 TABLET (1,000 MG) BY MOUTH 2 TIMES DAILY (WITH MEALS) **DUE FOR APRIL APPT/LABS. CALL MD** 180 tablet 1     Multiple Vitamins-Minerals (MULTIVITAMIN ADULTS PO) Take by mouth daily       oxyCODONE HCl (OXAYDO) 5 MG TABA Take 5-10 mg by mouth every 4 hours as needed       tamsulosin (FLOMAX) 0.4 MG 24 hr capsule Take 1 capsule by mouth daily.       aspirin (ASA) 325 MG EC tablet Take 1 tablet (325 mg) by mouth daily (Patient not taking: Reported on 12/19/2018) 90 tablet 3       MEDICATION CHANGES/RATIONALE:   Started voltaren gel for pain.    Controlled medications sent with patient:   Script for 30 medication for oxycodone tabs and 0 refills given to patient at dischage to have them fill at their out patient pharmacy     ROS:    10 point ROS of systems including Constitutional, Eyes, Respiratory, Cardiovascular, Gastroenterology, Genitourinary, Integumentary, Musculoskeletal, Neurological, Psychiatric were all negative except for pertinent positives noted in my HPI.    Physical Exam:   Vitals: /82   Pulse 89   Temp 99.4  F (37.4  C)   Resp 16   Ht 1.829 m (6')   Wt 112.3 kg (247 lb 9.6 oz)   SpO2 97%   BMI 33.58 kg/m     BMI= Body mass index is 33.58 kg/m .  GENERAL APPEARANCE:  Alert, pleasant and cooperative, oriented x 4, sitting on edge of bed with legs dangling on exam today  EYES:  EOM, lids, pupils and irises normal, sclera clear and conjunctiva normal, no discharge or mattering on lids or lashes noted  ENT:  Mouth normal, moist mucous membranes, nose without drainage or crusting, external ears without lesions, hearing acuity ok  RESP:  respiratory effort normal, no respiratory distress, Lung sounds clear, patient is on RA  CV: auscultation of heart done, rate and rhythm regular.  Generalized edema to BLE  ABDOMEN:  normal bowel sounds, soft, nontender, no grimacing or guarding with palpation.  M/S:   some  tenderness or swelling of the right knee  joint; able to move all extremities with limited ROM to right knee due to pain  SKIN:  Inspection and palpation of skin and subcutaneous tissue: skin warm, dry without rashes, right knee with aquacel dressing intact with minimal drainage that is circled and no new drainage that has extended beyond markings  NEURO: cranial nerves 2-12 grossly intact, no facial asymmetry, no speech deficits and able to follow directions, moves all extremities symmetrically  PSYCH:  insight and judgement intact, memory intact, affect and mood normal    DISCHARGE PLAN: Occupational Therapy, Physical Therapy and From:  [facility] to start on 12/31/18 per patient preference  Patient instructed to follow-up with:  PCP in 7 days        MTM referral needed and placed by this provider: No    Pending labs: None  SNF labs     CBC RESULTS:   Recent Labs   Lab Test 12/10/18  0627 12/09/18  0600  11/26/18  0946   WBC 8.8  --   --  8.3   RBC 3.26*  --   --  3.76*   HGB 9.3* 9.0*   < > 10.8*   HCT 30.6*  --   --  35.6*   MCV 94  --   --  95   MCH 28.5  --   --  28.7   MCHC 30.4*  --   --  30.3*   RDW 18.8*  --   --  18.6*     --   --  255    < > = values in this interval not displayed.       Last Basic Metabolic Panel:  Recent Labs   Lab Test 12/09/18  0600 12/08/18  0635 11/26/18  0946 11/05/18  0944   NA  --   --  144 139   POTASSIUM  --   --  4.7 4.7   CHLORIDE  --   --  111* 110*   SHANNAN  --   --  9.8 9.0   CO2  --   --  24 21   BUN  --   --  16 36*   CR  --   --  1.00 1.39*   * 112* 131* 138*       Lab Results   Component Value Date    A1C 6.4 10/17/2018    A1C 6.7 05/01/2018       Discharge Treatments:  Follow up with PCP, orthopedic surgeon as scheduled  Home care services as above    TOTAL DISCHARGE TIME:   Greater than 30 minutes  Electronically signed by:  RANJAN Butt Bridget M. Love, APRN CNP

## 2018-12-20 NOTE — Clinical Note
Patient is being discharged from TCU Saturday with home care services. He should follow up in 1-2 weeks

## 2018-12-21 DIAGNOSIS — E11.9 TYPE 2 DIABETES MELLITUS WITHOUT COMPLICATION, WITHOUT LONG-TERM CURRENT USE OF INSULIN (H): ICD-10-CM

## 2018-12-21 NOTE — TELEPHONE ENCOUNTER
"Requested Prescriptions   Pending Prescriptions Disp Refills     metFORMIN (GLUCOPHAGE) 1000 MG tablet [Pharmacy Med Name: METFORMIN HCL 1,000 MG TABLET]  Last Written Prescription Date: 06/26/2018  Last Fill Quantity: 180 tablet, # Refills: 1  Last Office Visit: 11/26/2018 [place]  Future Office visit:      180 tablet 1     Sig: TAKE 1 TABLET (1,000 MG) BY MOUTH 2 TIMES DAILY (WITH MEALS) **DUE FOR APRIL APPT/LABS. CALL MD**    Biguanide Agents Failed - 12/21/2018  1:36 AM       Failed - Blood pressure less than 140/90 in past 6 months    BP Readings from Last 3 Encounters:   12/20/18 143/82   12/19/18 155/86   12/18/18 155/86                Failed - Patient has documented LDL within the past 12 mos.    Recent Labs   Lab Test 10/07/17  1036   LDL 72            Failed - Patient has had a Microalbumin in the past 15 mos.    Recent Labs   Lab Test 03/05/16  0945   MICROL 72   UMALCR 50.35*            Passed - Patient is age 10 or older       Passed - Patient has documented A1c within the specified period of time.    If HgbA1C is 8 or greater, it needs to be on file within the past 3 months.  If less than 8, must be on file within the past 6 months.     Recent Labs   Lab Test 10/17/18  0709   A1C 6.4*            Passed - Patient's CR is NOT>1.4 OR Patient's EGFR is NOT<45 within past 12 mos.    Recent Labs   Lab Test 11/26/18  0946   GFRESTIMATED 73   GFRESTBLACK 88       Recent Labs   Lab Test 11/26/18  0946   CR 1.00            Passed - Patient does NOT have a diagnosis of CHF.       Passed - Recent (6 mo) or future (30 days) visit within the authorizing provider's specialty    Patient had office visit in the last 6 months or has a visit in the next 30 days with authorizing provider or within the authorizing provider's specialty.  See \"Patient Info\" tab in inbasket, or \"Choose Columns\" in Meds & Orders section of the refill encounter.              "

## 2018-12-24 ENCOUNTER — TELEPHONE (OUTPATIENT)
Dept: INTERNAL MEDICINE | Facility: CLINIC | Age: 73
End: 2018-12-24

## 2018-12-24 NOTE — TELEPHONE ENCOUNTER
IP F/U    Date: 12/22/18  Diagnosis: Osteoarthritis of Right Knee, Unspecified Osteoarthritis TypeDiagnosis}  Is patient active in care coordination? No  Was patient in TCU? Yes - Route to Care Coordination ([phone])

## 2018-12-27 ENCOUNTER — PATIENT OUTREACH (OUTPATIENT)
Dept: CARE COORDINATION | Facility: CLINIC | Age: 73
End: 2018-12-27

## 2018-12-27 NOTE — PROGRESS NOTES
"Clinic Care Coordination Contact  RN CC follow up.    Patient discharged from TCU on 12/22 with orders for home physical therapy and occupational therapy.      RN CC spoke with patient briefly.  He feels well.  Pain is controlled, he has not needed to fill the oxy prescription he was sent home with.  He is doing exercises on own and is able to get around the house on his own.  Patient felt he did not need home care services and called [facility] today to cancel services.  RN CC offered out patient physical therapy, but patient declined as well.  He is anxious to get back to work as he has been off for \"too long\".  Patient works from home.  Patient stated he has no needs at the moment.    Patient also declined further care coordination outreaches.  Patient given RN CC contact info and encouraged to call with any needs or concerns.    Audrey Ignacio RN-BSN   Care Coordination  Phone:  [phone]  Email: [email or web address]        "

## 2019-01-17 ENCOUNTER — HOSPITAL ENCOUNTER (OUTPATIENT)
Dept: ULTRASOUND IMAGING | Facility: CLINIC | Age: 74
Discharge: HOME | End: 2019-01-17
Attending: ORTHOPAEDIC SURGERY | Admitting: ORTHOPAEDIC SURGERY
Payer: COMMERCIAL

## 2019-01-17 ENCOUNTER — TRANSFERRED RECORDS (OUTPATIENT)
Dept: HEALTH INFORMATION MANAGEMENT | Facility: CLINIC | Age: 74
End: 2019-01-17

## 2019-01-17 DIAGNOSIS — I82.409 DVT (DEEP VENOUS THROMBOSIS) (H): ICD-10-CM

## 2019-01-17 DIAGNOSIS — I82.441 EMBOLISM AND THROMBOSIS OF RIGHT TIBIAL VEIN (H): ICD-10-CM

## 2019-01-17 PROCEDURE — 93971 EXTREMITY STUDY: CPT | Mod: RT

## 2019-02-11 ENCOUNTER — OFFICE VISIT (OUTPATIENT)
Dept: FAMILY MEDICINE | Facility: CLINIC | Age: 74
End: 2019-02-11
Payer: COMMERCIAL

## 2019-02-11 VITALS — HEART RATE: 98 BPM | OXYGEN SATURATION: 98 % | DIASTOLIC BLOOD PRESSURE: 82 MMHG | SYSTOLIC BLOOD PRESSURE: 138 MMHG

## 2019-02-11 DIAGNOSIS — L24.9 IRRITANT DERMATITIS: Primary | ICD-10-CM

## 2019-02-11 PROCEDURE — 99214 OFFICE O/P EST MOD 30 MIN: CPT | Performed by: FAMILY MEDICINE

## 2019-02-11 RX ORDER — TRIAMCINOLONE ACETONIDE 1 MG/G
OINTMENT TOPICAL
Qty: 80 G | Refills: 0 | Status: SHIPPED | OUTPATIENT
Start: 2019-02-11 | End: 2019-02-17

## 2019-02-11 NOTE — LETTER
2/11/2019         RE: Lance Ibrahim  [address]  [place] [address]        Dear Colleague,    Thank you for referring your patient, Lance Ibrahim, to the [facility]. Please see a copy of my visit note below.    [facility] - PRIMARY CARE SKIN    CC: Rash  SUBJECTIVE:   Lance Ibrahim is a(n) 73 year old male who presents to clinic today because of a(n) rash which that started 6 weeks , one week after right knee surgery. Itchy , red , scaly rash on the right lower leg. Now it is 65 % better. Initially was blistery, weepy with use of hydrocortisone 10 , it has improved.    Areas of involvement: right leg, small patch on the left medial malleolus    Other associated symptoms: no fever    Recent exposure history: none known.  Previous history of a similar rash: No.  Any other family members with similar symptoms: No.    Personal Medical History  Skin cancer: NO  Eczema Psoriasis Autoimmune   NO NO NO   Other:   .    Family Medical History  Skin cancer: NO  Eczema Psoriasis Autoimmune   NO NO NO   Other:   .    Occupation: indoor ,      Patient Active Problem List   Diagnosis     Ventral hernia     Chronic rhinitis     Hypertrophy of prostate with urinary obstruction     Transient cerebral ischemia     HTN (hypertension)     CARDIOVASCULAR SCREENING; LDL GOAL LESS THAN 100     Gout     Type 2 diabetes, HbA1c goal < 7% (H)     Obesity     Diabetes mellitus type 2, uncomplicated (H)     Benign essential hypertension     Cervicalgia     Sepsis (H)     Cellulitis     Diabetic foot infection (H)     Amputated toe, left (H)     Type 2 diabetes mellitus with peripheral vascular disease (H)     Total knee replacement status       Past Medical History:   Diagnosis Date     Arthritis     osteoarthritis knees     Cerebral artery occlusion with cerebral infarction (H)     TIA 1993, no residual     Chronic rhinitis      Diabetes mellitus (H)      HTN (hypertension)       Hypertrophy of prostate with urinary obstruction and other lower urinary tract symptoms (LUTS)      Sleep apnea     doesn't use cpap     TIA (transient ischaemic attack)      Unspecified transient cerebral ischemia     No definite source found    Past Surgical History:   Procedure Laterality Date     AMPUTATE TOE(S) Left 10/15/2018    Procedure: AMPUTATE TOE(S);  Left third toe amputation;  Surgeon: Ayaka Azevedo DPM, Podiatry/Foot and Ankle Surgery;  Location: [facility] OR     ARTHROPLASTY KNEE Right 2018    Procedure: Right total knee arthroplasty;  Surgeon: Issa Cunha MD;  Location: [facility] OR     C NONSPECIFIC PROCEDURE      Diastasis recti repair     C NONSPECIFIC PROCEDURE      Ventral hernia repair     C NONSPECIFIC PROCEDURE      ORIF of left shoulder     COLONOSCOPY  3/9/2013    Procedure: COLONOSCOPY;  COLONOSCOPY;  Surgeon: Chau Hogan MD;  Location:  GI     EYE SURGERY  2017    left eye cataract     FOOT SURGERY  2013    cyst removal      HERNIA REPAIR  2004    ventral       Social History     Tobacco Use     Smoking status: Former Smoker     Last attempt to quit: 3/17/1993     Years since quittin.9     Smokeless tobacco: Never Used   Substance Use Topics     Alcohol use: Yes     Comment: Occ, Once a month     Drug use: No    Family History     Problem (# of Occurrences) Relation (Name,Age of Onset)    Alcohol/Drug (1) Paternal Grandfather:     Cancer (1) Father:  74 yo brain    Diabetes (1) Maternal Grandfather:  89 yo    Family History Negative (1) Mother:  88 yo           Current Outpatient Medications   Medication Sig Dispense Refill     acetaminophen (TYLENOL) 325 MG tablet Take 650 mg by mouth every 6 hours as needed for mild pain       aspirin (ASA) 325 MG EC tablet Take 1 tablet (325 mg) by mouth daily (Patient not taking: Reported on 2018) 90 tablet 3     aspirin (ASA) 325 MG tablet Take 1 tablet (325 mg) by mouth  "daily       blood glucose monitoring (ACCU-CHEK FASTCLIX) lancets 1 EACH BY IN VITRO ROUTE 2 TIMES DAILY 204 each 1     blood glucose monitoring (ACCU-CHEK SMARTVIEW) test strip USE TO TEST TWICE DAILY 200 strip 1     diclofenac (VOLTAREN) 1 % topical gel Place 4 g onto the skin 4 times daily       finasteride (PROSCAR) 5 MG tablet Take 5 mg by mouth daily.       fluticasone (FLONASE) 50 MCG/ACT spray Spray 1 spray into both nostrils 2 times daily       lisinopril (PRINIVIL/ZESTRIL) 10 MG tablet TAKE 2 TABLETS (20 MG) BY MOUTH DAILY  3     loratadine (CLARITIN) 10 MG tablet Take 10 mg by mouth At Bedtime       metFORMIN (GLUCOPHAGE) 1000 MG tablet TAKE 1 TABLET (1,000 MG) BY MOUTH 2 TIMES DAILY (WITH MEALS) **DUE FOR APRIL APPT/LABS. CALL MD** 180 tablet 1     Multiple Vitamins-Minerals (MULTIVITAMIN ADULTS PO) Take by mouth daily       oxyCODONE HCl (OXAYDO) 5 MG TABA Take 5-10 mg by mouth every 4 hours as needed       tamsulosin (FLOMAX) 0.4 MG 24 hr capsule Take 1 capsule by mouth daily.           Allergies   Allergen Reactions     Penicillins Anaphylaxis     \"anaphylactic\"     Sulfa Drugs      \"itchy rash, swelling of face and hands\"     Ancef [Cefazolin] Rash          ROS: 14 point review of systems was negative except the symptoms listed above in the HPI.    OBJECTIVE:   GENERAL: healthy, alert and no distress.  SKIN: Huff Skin Type - II.  Legs examined. The dermatoscope was used to help evaluate pigmented lesions.  Skin Pertinent Findings:  Right lower leg :  Erythematous ,scaly, large patches   Left medial ankle ; 4 cm erythematous patch    Diagnostic Test Results:  none     ASSESSMENT:     Encounter Diagnosis   Name Primary?     Irritant dermatitis Yes     MDM : I suspect the initial trigger for the rash may have been the skin prep used on the skin and excessive dryness.    PLAN:   Patient Instructions   Cerave moisturizer in jar- apply at least twice per day  Triamcinolone 0.1% ointment- apply to " affected areas on right leg and left ankle two times per day for 10-14   Recheck if not improving    The patient was counseled to use products free of fragrance, dyes, and plants. The importance of using bland cleansers and the regular use of heavy bland emollient creams was impressed upon the patient.      TT: 25 minutes.  CT: 20 minutes.          Again, thank you for allowing me to participate in the care of your patient.        Sincerely,        Radha Charles MD

## 2019-02-11 NOTE — PROGRESS NOTES
[facility] - PRIMARY CARE SKIN    CC: Rash  SUBJECTIVE:   aLnce Ibrahim is a(n) 73 year old male who presents to clinic today because of a(n) rash which that started 6 weeks , one week after right knee surgery. Itchy , red , scaly rash on the right lower leg. Now it is 65 % better. Initially was blistery, weepy with use of hydrocortisone 10 , it has improved.    Areas of involvement: right leg, small patch on the left medial malleolus    Other associated symptoms: no fever    Recent exposure history: none known.  Previous history of a similar rash: No.  Any other family members with similar symptoms: No.    Personal Medical History  Skin cancer: NO  Eczema Psoriasis Autoimmune   NO NO NO   Other:   .    Family Medical History  Skin cancer: NO  Eczema Psoriasis Autoimmune   NO NO NO   Other:   .    Occupation: indoor ,      Patient Active Problem List   Diagnosis     Ventral hernia     Chronic rhinitis     Hypertrophy of prostate with urinary obstruction     Transient cerebral ischemia     HTN (hypertension)     CARDIOVASCULAR SCREENING; LDL GOAL LESS THAN 100     Gout     Type 2 diabetes, HbA1c goal < 7% (H)     Obesity     Diabetes mellitus type 2, uncomplicated (H)     Benign essential hypertension     Cervicalgia     Sepsis (H)     Cellulitis     Diabetic foot infection (H)     Amputated toe, left (H)     Type 2 diabetes mellitus with peripheral vascular disease (H)     Total knee replacement status       Past Medical History:   Diagnosis Date     Arthritis     osteoarthritis knees     Cerebral artery occlusion with cerebral infarction (H)     TIA 1993, no residual     Chronic rhinitis      Diabetes mellitus (H)      HTN (hypertension)      Hypertrophy of prostate with urinary obstruction and other lower urinary tract symptoms (LUTS)      Sleep apnea     doesn't use cpap     TIA (transient ischaemic attack) 1993     Unspecified transient cerebral ischemia 1993    No definite source  found    Past Surgical History:   Procedure Laterality Date     AMPUTATE TOE(S) Left 10/15/2018    Procedure: AMPUTATE TOE(S);  Left third toe amputation;  Surgeon: Ayaka Azevedo DPM, Podiatry/Foot and Ankle Surgery;  Location: [facility] OR     ARTHROPLASTY KNEE Right 2018    Procedure: Right total knee arthroplasty;  Surgeon: Issa Cunha MD;  Location: [facility] OR     C NONSPECIFIC PROCEDURE      Diastasis recti repair     C NONSPECIFIC PROCEDURE      Ventral hernia repair     C NONSPECIFIC PROCEDURE      ORIF of left shoulder     COLONOSCOPY  3/9/2013    Procedure: COLONOSCOPY;  COLONOSCOPY;  Surgeon: Chau Hogan MD;  Location:  GI     EYE SURGERY  2017    left eye cataract     FOOT SURGERY  2013    cyst removal      HERNIA REPAIR  2004    ventral       Social History     Tobacco Use     Smoking status: Former Smoker     Last attempt to quit: 3/17/1993     Years since quittin.9     Smokeless tobacco: Never Used   Substance Use Topics     Alcohol use: Yes     Comment: Occ, Once a month     Drug use: No    Family History     Problem (# of Occurrences) Relation (Name,Age of Onset)    Alcohol/Drug (1) Paternal Grandfather:     Cancer (1) Father:  74 yo brain    Diabetes (1) Maternal Grandfather:  89 yo    Family History Negative (1) Mother:  86 yo           Current Outpatient Medications   Medication Sig Dispense Refill     acetaminophen (TYLENOL) 325 MG tablet Take 650 mg by mouth every 6 hours as needed for mild pain       aspirin (ASA) 325 MG EC tablet Take 1 tablet (325 mg) by mouth daily (Patient not taking: Reported on 2018) 90 tablet 3     aspirin (ASA) 325 MG tablet Take 1 tablet (325 mg) by mouth daily       blood glucose monitoring (ACCU-CHEK FASTCLIX) lancets 1 EACH BY IN VITRO ROUTE 2 TIMES DAILY 204 each 1     blood glucose monitoring (ACCU-CHEK SMARTVIEW) test strip USE TO TEST TWICE DAILY 200 strip 1     diclofenac (VOLTAREN) 1 %  "topical gel Place 4 g onto the skin 4 times daily       finasteride (PROSCAR) 5 MG tablet Take 5 mg by mouth daily.       fluticasone (FLONASE) 50 MCG/ACT spray Spray 1 spray into both nostrils 2 times daily       lisinopril (PRINIVIL/ZESTRIL) 10 MG tablet TAKE 2 TABLETS (20 MG) BY MOUTH DAILY  3     loratadine (CLARITIN) 10 MG tablet Take 10 mg by mouth At Bedtime       metFORMIN (GLUCOPHAGE) 1000 MG tablet TAKE 1 TABLET (1,000 MG) BY MOUTH 2 TIMES DAILY (WITH MEALS) **DUE FOR APRIL APPT/LABS. CALL MD** 180 tablet 1     Multiple Vitamins-Minerals (MULTIVITAMIN ADULTS PO) Take by mouth daily       oxyCODONE HCl (OXAYDO) 5 MG TABA Take 5-10 mg by mouth every 4 hours as needed       tamsulosin (FLOMAX) 0.4 MG 24 hr capsule Take 1 capsule by mouth daily.           Allergies   Allergen Reactions     Penicillins Anaphylaxis     \"anaphylactic\"     Sulfa Drugs      \"itchy rash, swelling of face and hands\"     Ancef [Cefazolin] Rash          ROS: 14 point review of systems was negative except the symptoms listed above in the HPI.    OBJECTIVE:   GENERAL: healthy, alert and no distress.  SKIN: Huff Skin Type - II.  Legs examined. The dermatoscope was used to help evaluate pigmented lesions.  Skin Pertinent Findings:  Right lower leg :  Erythematous ,scaly, large patches   Left medial ankle ; 4 cm erythematous patch    Diagnostic Test Results:  none     ASSESSMENT:     Encounter Diagnosis   Name Primary?     Irritant dermatitis Yes     MDM : I suspect the initial trigger for the rash may have been the skin prep used on the skin and excessive dryness.    PLAN:   Patient Instructions   Cerave moisturizer in jar- apply at least twice per day  Triamcinolone 0.1% ointment- apply to affected areas on right leg and left ankle two times per day for 10-14   Recheck if not improving    The patient was counseled to use products free of fragrance, dyes, and plants. The importance of using bland cleansers and the regular use of " heavy bland emollient creams was impressed upon the patient.      TT: 25 minutes.  CT: 20 minutes.

## 2019-02-11 NOTE — PATIENT INSTRUCTIONS
Cerave moisturizer in jar- apply at least twice per day  Triamcinolone 0.1% ointment- apply to affected areas on right leg and left ankle two times per day for 10-14   Recheck if not improving

## 2019-02-17 ENCOUNTER — APPOINTMENT (OUTPATIENT)
Dept: CT IMAGING | Facility: CLINIC | Age: 74
End: 2019-02-17
Attending: EMERGENCY MEDICINE
Payer: COMMERCIAL

## 2019-02-17 ENCOUNTER — HOSPITAL ENCOUNTER (OUTPATIENT)
Facility: CLINIC | Age: 74
Setting detail: OBSERVATION
Discharge: HOME | End: 2019-02-18
Attending: EMERGENCY MEDICINE | Admitting: INTERNAL MEDICINE
Payer: COMMERCIAL

## 2019-02-17 ENCOUNTER — APPOINTMENT (OUTPATIENT)
Dept: ULTRASOUND IMAGING | Facility: CLINIC | Age: 74
End: 2019-02-17
Attending: EMERGENCY MEDICINE
Payer: COMMERCIAL

## 2019-02-17 ENCOUNTER — APPOINTMENT (OUTPATIENT)
Dept: GENERAL RADIOLOGY | Facility: CLINIC | Age: 74
End: 2019-02-17
Attending: EMERGENCY MEDICINE
Payer: COMMERCIAL

## 2019-02-17 DIAGNOSIS — I10 HYPERTENSION, UNSPECIFIED TYPE: ICD-10-CM

## 2019-02-17 DIAGNOSIS — R06.00 DYSPNEA, UNSPECIFIED TYPE: ICD-10-CM

## 2019-02-17 DIAGNOSIS — R07.9 ACUTE CHEST PAIN: ICD-10-CM

## 2019-02-17 LAB
ANION GAP SERPL CALCULATED.3IONS-SCNC: 8 MMOL/L (ref 3–14)
BASOPHILS # BLD AUTO: 0.1 10E9/L (ref 0–0.2)
BASOPHILS NFR BLD AUTO: 0.7 %
BUN SERPL-MCNC: 20 MG/DL (ref 7–30)
CALCIUM SERPL-MCNC: 9 MG/DL (ref 8.5–10.1)
CHLORIDE SERPL-SCNC: 110 MMOL/L (ref 94–109)
CO2 SERPL-SCNC: 23 MMOL/L (ref 20–32)
CREAT SERPL-MCNC: 1.17 MG/DL (ref 0.66–1.25)
D DIMER PPP FEU-MCNC: 2.7 UG/ML FEU (ref 0–0.5)
DIFFERENTIAL METHOD BLD: ABNORMAL
EOSINOPHIL # BLD AUTO: 0.3 10E9/L (ref 0–0.7)
EOSINOPHIL NFR BLD AUTO: 3.4 %
ERYTHROCYTE [DISTWIDTH] IN BLOOD BY AUTOMATED COUNT: 16.9 % (ref 10–15)
GFR SERPL CREATININE-BSD FRML MDRD: 61 ML/MIN/{1.73_M2}
GLUCOSE SERPL-MCNC: 142 MG/DL (ref 70–99)
HCT VFR BLD AUTO: 38.6 % (ref 40–53)
HGB BLD-MCNC: 11.6 G/DL (ref 13.3–17.7)
IMM GRANULOCYTES # BLD: 0 10E9/L (ref 0–0.4)
IMM GRANULOCYTES NFR BLD: 0.5 %
LYMPHOCYTES # BLD AUTO: 1.8 10E9/L (ref 0.8–5.3)
LYMPHOCYTES NFR BLD AUTO: 19.7 %
MCH RBC QN AUTO: 27.6 PG (ref 26.5–33)
MCHC RBC AUTO-ENTMCNC: 30.1 G/DL (ref 31.5–36.5)
MCV RBC AUTO: 92 FL (ref 78–100)
MONOCYTES # BLD AUTO: 0.7 10E9/L (ref 0–1.3)
MONOCYTES NFR BLD AUTO: 7.8 %
NEUTROPHILS # BLD AUTO: 6 10E9/L (ref 1.6–8.3)
NEUTROPHILS NFR BLD AUTO: 67.9 %
NRBC # BLD AUTO: 0 10*3/UL
NRBC BLD AUTO-RTO: 0 /100
NT-PROBNP SERPL-MCNC: 633 PG/ML (ref 0–900)
PLATELET # BLD AUTO: 278 10E9/L (ref 150–450)
POTASSIUM SERPL-SCNC: 4 MMOL/L (ref 3.4–5.3)
RBC # BLD AUTO: 4.21 10E12/L (ref 4.4–5.9)
SODIUM SERPL-SCNC: 141 MMOL/L (ref 133–144)
TROPONIN I BLD-MCNC: 0.03 UG/L (ref 0–0.08)
TROPONIN I SERPL-MCNC: 0.02 UG/L (ref 0–0.04)
TROPONIN I SERPL-MCNC: 0.03 UG/L (ref 0–0.04)
WBC # BLD AUTO: 8.9 10E9/L (ref 4–11)

## 2019-02-17 PROCEDURE — 85379 FIBRIN DEGRADATION QUANT: CPT | Performed by: EMERGENCY MEDICINE

## 2019-02-17 PROCEDURE — 83880 ASSAY OF NATRIURETIC PEPTIDE: CPT | Performed by: EMERGENCY MEDICINE

## 2019-02-17 PROCEDURE — 25000132 ZZH RX MED GY IP 250 OP 250 PS 637: Performed by: PHYSICIAN ASSISTANT

## 2019-02-17 PROCEDURE — A9270 NON-COVERED ITEM OR SERVICE: HCPCS | Mod: GY | Performed by: EMERGENCY MEDICINE

## 2019-02-17 PROCEDURE — 71260 CT THORAX DX C+: CPT

## 2019-02-17 PROCEDURE — 96361 HYDRATE IV INFUSION ADD-ON: CPT

## 2019-02-17 PROCEDURE — 99220 ZZC INITIAL OBSERVATION CARE,LEVL III: CPT | Performed by: PHYSICIAN ASSISTANT

## 2019-02-17 PROCEDURE — 93970 EXTREMITY STUDY: CPT

## 2019-02-17 PROCEDURE — 36415 COLL VENOUS BLD VENIPUNCTURE: CPT

## 2019-02-17 PROCEDURE — 80048 BASIC METABOLIC PNL TOTAL CA: CPT | Performed by: EMERGENCY MEDICINE

## 2019-02-17 PROCEDURE — 84484 ASSAY OF TROPONIN QUANT: CPT | Performed by: EMERGENCY MEDICINE

## 2019-02-17 PROCEDURE — 84484 ASSAY OF TROPONIN QUANT: CPT | Mod: 91 | Performed by: PHYSICIAN ASSISTANT

## 2019-02-17 PROCEDURE — 25000132 ZZH RX MED GY IP 250 OP 250 PS 637: Mod: GY | Performed by: EMERGENCY MEDICINE

## 2019-02-17 PROCEDURE — 99285 EMERGENCY DEPT VISIT HI MDM: CPT | Mod: 25

## 2019-02-17 PROCEDURE — 25000128 H RX IP 250 OP 636: Performed by: EMERGENCY MEDICINE

## 2019-02-17 PROCEDURE — 36415 COLL VENOUS BLD VENIPUNCTURE: CPT | Performed by: PHYSICIAN ASSISTANT

## 2019-02-17 PROCEDURE — 71046 X-RAY EXAM CHEST 2 VIEWS: CPT

## 2019-02-17 PROCEDURE — 84484 ASSAY OF TROPONIN QUANT: CPT

## 2019-02-17 PROCEDURE — 85025 COMPLETE CBC W/AUTO DIFF WBC: CPT | Performed by: EMERGENCY MEDICINE

## 2019-02-17 PROCEDURE — 96360 HYDRATION IV INFUSION INIT: CPT | Mod: 59

## 2019-02-17 PROCEDURE — G0378 HOSPITAL OBSERVATION PER HR: HCPCS

## 2019-02-17 PROCEDURE — 93005 ELECTROCARDIOGRAM TRACING: CPT

## 2019-02-17 RX ORDER — LORATADINE 10 MG/1
10 TABLET ORAL AT BEDTIME
Status: DISCONTINUED | OUTPATIENT
Start: 2019-02-17 | End: 2019-02-18 | Stop reason: HOSPADM

## 2019-02-17 RX ORDER — IOPAMIDOL 755 MG/ML
500 INJECTION, SOLUTION INTRAVASCULAR ONCE
Status: COMPLETED | OUTPATIENT
Start: 2019-02-17 | End: 2019-02-17

## 2019-02-17 RX ORDER — FINASTERIDE 5 MG/1
5 TABLET, FILM COATED ORAL DAILY
Status: DISCONTINUED | OUTPATIENT
Start: 2019-02-18 | End: 2019-02-18 | Stop reason: HOSPADM

## 2019-02-17 RX ORDER — NALOXONE HYDROCHLORIDE 0.4 MG/ML
.1-.4 INJECTION, SOLUTION INTRAMUSCULAR; INTRAVENOUS; SUBCUTANEOUS
Status: DISCONTINUED | OUTPATIENT
Start: 2019-02-17 | End: 2019-02-18 | Stop reason: HOSPADM

## 2019-02-17 RX ORDER — FLUTICASONE PROPIONATE 50 MCG
1 SPRAY, SUSPENSION (ML) NASAL 2 TIMES DAILY
Status: DISCONTINUED | OUTPATIENT
Start: 2019-02-17 | End: 2019-02-18 | Stop reason: HOSPADM

## 2019-02-17 RX ORDER — LIDOCAINE 40 MG/G
CREAM TOPICAL
Status: DISCONTINUED | OUTPATIENT
Start: 2019-02-17 | End: 2019-02-18 | Stop reason: HOSPADM

## 2019-02-17 RX ORDER — ALUMINA, MAGNESIA, AND SIMETHICONE 2400; 2400; 240 MG/30ML; MG/30ML; MG/30ML
30 SUSPENSION ORAL EVERY 4 HOURS PRN
Status: DISCONTINUED | OUTPATIENT
Start: 2019-02-17 | End: 2019-02-18 | Stop reason: HOSPADM

## 2019-02-17 RX ORDER — LISINOPRIL 20 MG/1
20 TABLET ORAL DAILY
Status: DISCONTINUED | OUTPATIENT
Start: 2019-02-18 | End: 2019-02-18 | Stop reason: HOSPADM

## 2019-02-17 RX ORDER — TAMSULOSIN HYDROCHLORIDE 0.4 MG/1
0.4 CAPSULE ORAL DAILY
Status: DISCONTINUED | OUTPATIENT
Start: 2019-02-18 | End: 2019-02-18 | Stop reason: HOSPADM

## 2019-02-17 RX ORDER — NITROGLYCERIN 0.4 MG/1
0.4 TABLET SUBLINGUAL EVERY 5 MIN PRN
Status: DISCONTINUED | OUTPATIENT
Start: 2019-02-17 | End: 2019-02-18 | Stop reason: HOSPADM

## 2019-02-17 RX ORDER — ACETAMINOPHEN 325 MG/1
650 TABLET ORAL EVERY 4 HOURS PRN
Status: DISCONTINUED | OUTPATIENT
Start: 2019-02-17 | End: 2019-02-18 | Stop reason: HOSPADM

## 2019-02-17 RX ORDER — NITROGLYCERIN 0.4 MG/1
0.4 TABLET SUBLINGUAL EVERY 5 MIN PRN
Status: DISCONTINUED | OUTPATIENT
Start: 2019-02-17 | End: 2019-02-17

## 2019-02-17 RX ORDER — IBUPROFEN 200 MG
400 TABLET ORAL EVERY 6 HOURS PRN
Status: ON HOLD | COMMUNITY
End: 2019-03-05

## 2019-02-17 RX ADMIN — IOPAMIDOL 90 ML: 755 INJECTION, SOLUTION INTRAVENOUS at 09:28

## 2019-02-17 RX ADMIN — SODIUM CHLORIDE 100 ML: 9 INJECTION, SOLUTION INTRAVENOUS at 09:28

## 2019-02-17 RX ADMIN — METFORMIN HYDROCHLORIDE 1000 MG: 500 TABLET, FILM COATED ORAL at 17:46

## 2019-02-17 RX ADMIN — SODIUM CHLORIDE 500 ML: 9 INJECTION, SOLUTION INTRAVENOUS at 08:30

## 2019-02-17 RX ADMIN — NITROGLYCERIN 0.4 MG: 0.4 TABLET SUBLINGUAL at 07:50

## 2019-02-17 RX ADMIN — FLUTICASONE PROPIONATE 1 SPRAY: 50 SPRAY, METERED NASAL at 21:53

## 2019-02-17 RX ADMIN — LORATADINE 10 MG: 10 TABLET ORAL at 21:53

## 2019-02-17 ASSESSMENT — ENCOUNTER SYMPTOMS
CHEST TIGHTNESS: 1
ABDOMINAL PAIN: 0
WHEEZING: 1
DIZZINESS: 0
CHILLS: 0
COUGH: 0
FEVER: 0
DIAPHORESIS: 0
SHORTNESS OF BREATH: 1

## 2019-02-17 NOTE — PROGRESS NOTES
ROOM # [id number]    Living Situation (if not independent, order SW consult): with wife, peg caregiver for wife  Facility name: NA  : wife-LILIAM -[phone]    Activity level at baseline: peg  Activity level on admit: peg      Patient registered to observation; given Patient Bill of Rights; given the opportunity to ask questions about observation status and their plan of care.  Patient has been oriented to the observation room, bathroom and call light is in place.    Discussed discharge goals and expectations with patient/family.

## 2019-02-17 NOTE — PLAN OF CARE
Patient No change  PRIMARY DIAGNOSIS: CHEST PAIN/Dyspnea  OUTPATIENT/OBSERVATION GOALS TO BE MET BEFORE DISCHARGE:  1. Ruled out acute coronary syndrome (negative or stable Troponin):  serial troponins ordered   2. Pain Status: Pain free.  3. Appropriate provocative testing performed: Yes  - Stress Test Procedure: Echo  - Interpretation of cardiac rhythm per telemetry tech: SR 80s    4. Cleared by Consultants (if applicable):No  5. Return to near baseline physical activity: No  Discharge Planner Nurse   Safe discharge environment identified: Yes  Barriers to discharge: Yes       Entered by: Bashir Arthur 02/17/2019 4:21 PM         A&O.  C/O dyspnea with any exertion. On 1 L NC with O2 sats 94%.  On RA sats were 89%.  Lungs clear. Denies any chest pain. VSS.  Ongoing assessment.    Please review provider order for any additional goals.   Nurse to notify provider when observation goals have been met and patient is ready for discharge.

## 2019-02-17 NOTE — PHARMACY-ADMISSION MEDICATION HISTORY
Admission medication history interview status for this patient is complete. See [facility] admission navigator for allergy information, prior to admission medications and immunization status.     Medication history interview source(s):Patient  Medication history resources (including written lists, pill bottles, clinic record):None  Primary pharmacy: Not ID'd    Changes made to PTA medication list:  Added: Ibuprofen (400mg PO Q6H PRN Right Knee Pain)  Deleted: Aspirin (duplicate entry), Diclofenac, Triamcinolone, Oxycodone  Changed: MVT (#1 tablet PO QAM)    Actions taken by pharmacist (provider contacted, etc):None     Additional medication history information:None    Medication reconciliation/reorder completed by provider prior to medication history? No    Prior to Admission medications    Medication Sig Last Dose Taking? Auth Provider   aspirin (ASA) 325 MG EC tablet Take 1 tablet (325 mg) by mouth daily 2/17/2019 at AM Yes Issa Cunha MD   finasteride (PROSCAR) 5 MG tablet Take 5 mg by mouth daily. 2/17/2019 at AM Yes Reported, Patient   fluticasone (FLONASE) 50 MCG/ACT spray Spray 1 spray into both nostrils 2 times daily 2/17/2019 at AM Yes Unknown, Entered By History   lisinopril (PRINIVIL/ZESTRIL) 10 MG tablet TAKE 2 TABLETS (20 MG) BY MOUTH DAILY 2/17/2019 at AM Yes Reported, Patient   loratadine (CLARITIN) 10 MG tablet Take 10 mg by mouth At Bedtime 2/16/2019 at HS Yes Reported, Patient   metFORMIN (GLUCOPHAGE) 1000 MG tablet TAKE 1 TABLET (1,000 MG) BY MOUTH 2 TIMES DAILY (WITH MEALS) **DUE FOR APRIL APPT/LABS. CALL MD** 2/17/2019 at AM Yes Shannon Ta MD   Multiple Vitamins-Minerals (MULTIVITAMIN ADULTS PO) Take 1 tablet by mouth daily  2/17/2019 at AM Yes Reported, Patient   tamsulosin (FLOMAX) 0.4 MG 24 hr capsule Take 1 capsule by mouth daily. 2/17/2019 at AM Yes Reported, Patient

## 2019-02-17 NOTE — ED NOTES
"[facility]  ED Nurse Handoff Report    Lance Ibrahim is a 73 year old male   ED Chief complaint: Chest Pain  . ED Diagnosis:   Final diagnoses:   Acute chest pain   Dyspnea, unspecified type   Hypertension, unspecified type     Allergies:   Allergies   Allergen Reactions     Penicillins Anaphylaxis     \"anaphylactic\"     Sulfa Drugs      \"itchy rash, swelling of face and hands\"     Ancef [Cefazolin] Rash       Code Status: Full Code  Activity level - Baseline/Home:  Independent. Activity Level - Current:   Independent. Lift room needed: No. Bariatric: No   Needed: No   Isolation: No. Infection: Not Applicable.     Vital Signs:   Vitals:    02/17/19 0830 02/17/19 0900 02/17/19 0945 02/17/19 1000   BP: 138/78 153/86 151/80 149/80   Pulse: 75 82 76 69   Resp:       Temp:       TempSrc:       SpO2: 94% 92% 90% 92%       Cardiac Rhythm:  ,   Cardiac  Cardiac Rhythm: Normal sinus rhythm  Pain level: 0-10 Pain Scale: 6  Patient confused: No. Patient Falls Risk: No.   Elimination Status: Has voided   Patient Report - Initial Complaint: Chest tightness, SOB. Focused Assessment:    07:50 Cardiac Cardiac Monitoring - EKG Monitoring: Yes Cardiac Regularity: Regular Cardiac Rhythm: NSR  Chest Pain Assessment - Character: tightness ER     07:50 Peripheral Vascular Edema - Edema: leg, right; leg, left ER     07:50 Respiratory Respiratory - Rhythm/Pattern, Respiratory: shortness of breath Breath Sounds: All Fields  Head To Toe Assessment - All Lung Fields Breath Sounds: Anterior:; Lateral:; clear; diminished ER        Tests Performed: labs, imaging. Abnormal Results:   Chest CT, IV contrast only - PE protocol   Final Result   IMPRESSION:    1. No pulmonary embolism is seen.   2. Small bilateral pleural effusions and mild bibasilar lung   atelectasis.   3. No change in mild mediastinal lymphadenopathy.      FLORENCE SAMPSON MD      US Lower Extremity Venous Duplex Bilateral   Final Result   IMPRESSION: No " evidence of deep venous thrombosis in either lower   extremity.      TUCKER BO MD      Chest XR,  PA & LAT   Final Result   IMPRESSION: Again seen is moderate diffuse interstitial prominence   throughout both lungs. This does not appear significantly changed and   may be a chronic process. No other abnormality or change is seen.       FLORENCE SAMPSON MD        Labs Ordered and Resulted from Time of ED Arrival Up to the Time of Departure from the ED   CBC WITH PLATELETS DIFFERENTIAL - Abnormal; Notable for the following components:       Result Value    RBC Count 4.21 (*)     Hemoglobin 11.6 (*)     Hematocrit 38.6 (*)     MCHC 30.1 (*)     RDW 16.9 (*)     All other components within normal limits   BASIC METABOLIC PANEL - Abnormal; Notable for the following components:    Chloride 110 (*)     Glucose 142 (*)     All other components within normal limits   D DIMER QUANTITATIVE - Abnormal; Notable for the following components:    D Dimer 2.7 (*)     All other components within normal limits   TROPONIN I   NT PROBNP INPATIENT   PULSE OXIMETRY NURSING   CARDIAC CONTINUOUS MONITORING   PERIPHERAL IV CATHETER   PATIENT CARE ORDER   ISTAT TROPONIN NURSING POCT   TROPONIN POCT   .   Treatments provided: IVF, NTG  Family Comments: none present  OBS brochure/video discussed/provided to patient:  No  ED Medications:   Medications   nitroGLYcerin (NITROSTAT) sublingual tablet 0.4 mg (0.4 mg Sublingual Given 2/17/19 0750)   0.9% sodium chloride BOLUS (0 mLs Intravenous Stopped 2/17/19 1019)   iopamidol (ISOVUE-370) solution 500 mL (90 mLs Intravenous Given 2/17/19 0928)   0.9% sodium chloride BOLUS (0 mLs Intravenous Stopped 2/17/19 0929)     Drips infusing:  No  For the majority of the shift, the patient's behavior Green. Interventions performed were NA.     Severe Sepsis OR Septic Shock Diagnosis Present: No      ED Nurse Name/Phone Number: Deanne Samson,   10:20 AM      RECEIVING UNIT ED HANDOFF REVIEW    Above ED Nurse  Handoff Report was reviewed: Yes  Reviewed by: Tatyana Anne on February 17, 2019 at 10:47 AM

## 2019-02-17 NOTE — ED TRIAGE NOTES
Patient to ED with chest tightness since 4am. Patient has h/o CVA, takes full strength ASA daily and had dose today. Chest tightness is accompanies by SOB.

## 2019-02-17 NOTE — ED PROVIDER NOTES
History     Chief Complaint:    Chest Pressure and Tightness    HPI   Lance Ibrahim is a 73 year old male with a history of diabetes, hypertension, TIA, amongst others, who presents with chest pressure and tightness. The patient reports that this morning he woke up around 0400 with chest tightness and pressure accompanied by shortness of breath. He states that he has been having to mouth breath because he feels like he can't get a full breath. The patient states that when he tried to lay back down, he was hearing wheezing from his chest. The patient denies abdominal pain, fever, chills, cough, dizziness, diaphoresis, loss of consciousness, leg pain, worsening of his chronic leg swelling. He notes that his right leg has been more swollen than his left since his right knee replacement surgery a few months prior. He also denies history of MI or atrial fibrillation or  symptoms last night. Of note, he recalls that he had a TIA due to a blood clot.     Allergies:  Penicillins; anaphylaxis  Sulfa drugs  Ancef; rash     Medications:    Aspirin  Blood glucose monitoring   Voltaren  Proscar  Flonase  Lisinopril  Claritin  Glucophage  Flomax  Kenalog     Past Medical History:    Arthritis   Cerebral artery occlusion with cerebral infarction    Chronic rhinitis   Diabetes mellitus   HTN (hypertension)   Hypertrophy of prostate with urinary obstruction and other lower urinary tract symptoms   Sleep apnea   TIA (transient ischaemic attack)   Unspecified transient cerebral ischemia    Past Surgical History:    Amputate toes  Arthoplasty knee  Diastasis recti repair  Ventral hernia repair  Orif of left shoulder  colonscopy  Left eye cataract surgery  Foot cyst removal     Family History:    Father: Cancer  Maternal Grandfather: Diabetes  Paternal Grandfather: Alcohol/Drug     Social History:  The patient was accompanied to the ED by himself.  Smoking Status: Former Smoker  Smokeless Tobacco: Never Used  Alcohol Use:  Positive  Drug Use: Negative  PCP: Anh Spivey   Marital Status:       Review of Systems   Constitutional: Negative for chills, diaphoresis and fever.   Respiratory: Positive for chest tightness, shortness of breath and wheezing. Negative for cough.    Cardiovascular: Positive for chest pain (pressure). Negative for leg swelling (no worsening).   Gastrointestinal: Negative for abdominal pain.   Musculoskeletal:        No leg pain   Neurological: Negative for dizziness and syncope.   All other systems reviewed and are negative.    Physical Exam     Patient Vitals for the past 24 hrs:   BP Temp Temp src Pulse Heart Rate Resp SpO2   02/17/19 1119 (!) 194/93 96.5  F (35.8  C) Oral 86 -- 22 93 %   02/17/19 1030 (!) 165/93 -- -- 82 82 -- 96 %   02/17/19 1000 149/80 -- -- 69 80 -- 92 %   02/17/19 0945 151/80 -- -- 76 78 -- 90 %   02/17/19 0900 153/86 -- -- 82 -- -- 92 %   02/17/19 0830 138/78 -- -- 75 -- -- 94 %   02/17/19 0750 145/80 -- -- 91 89 -- 91 %   02/17/19 0746 157/84 -- -- 88 -- -- 94 %   02/17/19 0717 170/89 98  F (36.7  C) Oral -- 94 18 96 %   02/17/19 0716 170/89 -- -- 92 -- -- --      Physical Exam  General: Elderly male sitting upright  Eyes: PERRL, Conjunctive within normal limits  ENT: Moist mucous membranes, oropharynx clear.   CV: Normal S1S2, no murmur, rub or gallop. Regular rate and rhythm. Radial pulses intact and equal bilaterally.  Resp: Clear to auscultation bilaterally, no wheezes, rales or rhonchi. Normal respiratory effort.  GI: Abdomen is soft, nontender and nondistended. No palpable masses. No rebound or guarding.  MSK: Bilateral lower extremity edema, right greater than left. Nontender. No palpable cord or mass. Normal active range of motion.  Skin: Warm and dry. No rashes or lesions or ecchymoses on visible skin.  Neuro: Alert and oriented. Responds appropriately to all questions and commands. No focal findings appreciated. Normal muscle tone.  Psych: Normal mood and affect.  Pleasant.    Emergency Department Course     ECG:  ECG taken at 0717, ECG read at 0721  Normal sinus rhythm  Minimal voltage criteria for LVH, may be normal variant  Borderline ECG  Rate 94 bpm. ID interval 188 ms. QRS duration 110 ms. QT/QTc 366/457 ms. P-R-T axes 60 -27 82.    Imaging:  Radiology findings were communicated with the patient who voiced understanding of the findings.    Chest CT, IV contrast only - PE protocol  1. No pulmonary embolism is seen.  2. Small bilateral pleural effusions and mild bibasilar lung  atelectasis.  3. No change in mild mediastinal lymphadenopathy.  FLORENCE SAMPSON MD  Reading per radiology    US Lower Extremity Venous Duplex Bilateral  No evidence of deep venous thrombosis in either lower  extremity.  TUCKER BO MD  Reading per radiology    Chest XR,  PA & LAT  Again seen is moderate diffuse interstitial prominence  throughout both lungs. This does not appear significantly changed and  may be a chronic process. No other abnormality or change is seen.   FLORENCE SAMPSON MD  Reading per radiology    Laboratory:  Laboratory findings were communicated with the patient who voiced understanding of the findings.    Troponin POCT (Collected 0729): 0.03   Troponin (Collected 0723): 0.024   CBC: WBC 8.9, HGB 11.6 (L),   BMP: Chloride 110 (H), Glucose 142 (H) o/w WNL (Creatinine 1.17)  Nt probnp inpatient: 633  D Dimer Quantitative: 2.7 (H)    Interventions:  0750 Nitrostat 0.4 mg sublingual  0830  mL IV     Emergency Department Course:     Nursing notes and vitals reviewed.    0722 I performed an exam of the patient as documented above.     0723 IV was inserted and blood was drawn for laboratory testing, results above.     0729 Blood drawn. This was sent to the lab for further testing, results above.     0759 The patient was sent for a US while in the emergency department, results above.      0818 The patient was sent for a XR while in the emergency department, results  above.       Patient rechecked and updated.  The patient states that his symptoms completely resolved after nitroglycerin and have not returned since. He denies any symptoms including shortness of breath. Patient sent for CT chest.      1029 I spoke with Marcella Boss for Dr. Barakat of the hospitalist service from [facility] regarding patient's presentation, findings, and plan of care.      I personally reviewed the imaging, lab, and EKG results with the patient and answered all related questions prior to admission.    Impression & Plan      Medical Decision Making:  Lance Ibrahim is a 73 year old male who presents to the emergency department today for evaluation of chest pain of unclear etiology, however, I have concern that it represent a cardiac etiology.  Their EKG, chest pain, and troponin have been unremarkable however I do feel the warrant admission to observation for further EKGs, trops, and provocative testing.  I have also considered PE but CT did not show evidence of this (D-dimer was elevated). There are no focal infiltrates,  pulm edema, or evidence of PTX. There are small effusions but the patient does not appear to be in significant CHF. The patient has not had recent fevers or viral infections to suggest pericarditis. All of this was discussed with the pt and they have agreed to come in for further work-up for serial troponins and possible provocative testing.  So far they have received nitroglycerin for pain and they currently are pain free.  He took aspirin at home. At the time of their transfer out of the ED they are hemodynamically stable.    Diagnosis:    ICD-10-CM    1. Acute chest pain R07.9    2. Dyspnea, unspecified type R06.00    3. Hypertension, unspecified type I10      Disposition:   The patient is admitted into the care of Marcella Barakat.     Scribe Disclosure:  I, Orla Severson, am serving as a scribe at 7:22 AM on 2/17/2019 to document services personally performed by Shawna Guerrero  MD Sofiya based on my observations and the provider's statements to me.     [facility] EMERGENCY DEPARTMENT       Shawna Guerrero MD  02/17/19 8171

## 2019-02-17 NOTE — H&P
[facility] Outpatient / Observation Unit  History and Physical Exam     Lance Ibrahim MRN# [id number]   YOB: 1945 Age: 73 year old      Date of Admission:  2/17/2019    Primary care provider: Anh Spivey          Assessment:   Lance Ibrahim is a 73 year old male with a PMH significant for DM, HTN, BPH, BRIANNA and TIA, who presents with chest pain and dyspnea.   Work up in ED reveals: hypertensive but VS otherwise stable. BMP was unremarkable other than glucose of 142. CBC is fairly unremarkable, Hgb is mildly low at 11.6, which is stable compared to recent labs. BNP is normal and troponin is detectable at 0.024. D-dimer elevated at 2.7, CT of the chest done that was negative for PE or other significant acute process, small bilateral pleural effusion with mild atelectasis was noted. CXR unchanged and EKG SR with possible LVH. LE US negative for DVT. He received a 500 mL NS bolus and one sl nitroglycerin with resolution of his pain.   Patient is being registered to observation for further evaluation and to rule out possible ACS.     1. Acute Chest pain: sudden onset of chest pain dyspnea upon waking today, dyspneic with minimal activity today. Previously able to go up and down the stairs at home without chest pain or significant dyspnea. Hx of TIA, denies hx of CAD, remote smoking hx, hx of DM and HTN. EKG is nonischemic, initial troponin is detectable but negative. Will monitor on tele and trend troponins and obtain a stress echo in the AM.  2. DM - resume metformin, ok to eat  3. HTN - BPs elevated in ED, resume home meds  4. BRIANNA - does not use CPAP  5. BPH - resume Flomax  6. TIA - he reports hx of bilateral stroke but calls it a TIA, states he had bilateral LE paralysis that required 3 months of intense rehab to regain strength.            Plan:     1. [place] to Observation  2. Continue telemetry  3. Follow serial troponins  4. Stress testing with Stress Echo  5. Cont Aspirin  mg po daily  6.  Blood pressure control  7. Morphine and nitroglycerine PRN for pain    8. regular diet, No caffeine if Nuclear testing selected  9. DVT prophylaxis: pt at low risk, encourage ambulation  10. Code Status: full code  11. Dispo: home tomorrow pending stress test results                  Chief Complaint:   Chest Pain, dyspnea         History of Present Illness:   Lance Ibrahim is a 73 year old male with a PMH significant for DM, HTN, BPH, BRIANNA and TIA, who presents with chest pain and dyspnea. The patient states he woke up at 0400 with anterior chest pressure and dyspnea. He notes worsening dyspnea with minimal exertion. He denies associated nausea or diaphoresis. He denies previous episodes of similar symptoms. 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 He notes he rolled over in bed and noted a wheezing sensation. He denies recent fever, chills, cough, abd pain, vomiting, diarrhea or dysuria. He notes a remote hx of smoking and DM and HTN. He denies hx of CAD.             Past Medical History:     Past Medical History:   Diagnosis Date     Arthritis     osteoarthritis knees     Cerebral artery occlusion with cerebral infarction (H)     TIA 1993, no residual     Chronic rhinitis      Diabetes mellitus (H)      HTN (hypertension)      Hypertrophy of prostate with urinary obstruction and other lower urinary tract symptoms (LUTS)      Sleep apnea     doesn't use cpap     TIA (transient ischaemic attack) 1993     Unspecified transient cerebral ischemia 1993    No definite source found               Past Surgical History:     Past Surgical History:   Procedure Laterality Date     AMPUTATE TOE(S) Left 10/15/2018    Procedure: AMPUTATE TOE(S);  Left third toe amputation;  Surgeon: Ayaka Azevedo DPM, Podiatry/Foot and Ankle Surgery;  Location: [facility] OR     ARTHROPLASTY KNEE Right 12/7/2018    Procedure: Right total knee  "arthroplasty;  Surgeon: Issa Cunha MD;  Location: [facility] OR     C NONSPECIFIC PROCEDURE      Diastasis recti repair     C NONSPECIFIC PROCEDURE      Ventral hernia repair     C NONSPECIFIC PROCEDURE      ORIF of left shoulder     COLONOSCOPY  3/9/2013    Procedure: COLONOSCOPY;  COLONOSCOPY;  Surgeon: Chau Hogan MD;  Location:  GI     EYE SURGERY  2017    left eye cataract     FOOT SURGERY  2013    cyst removal      HERNIA REPAIR  2004    ventral                Social History:     Social History     Socioeconomic History     Marital status:      Spouse name: Not on file     Number of children: Not on file     Years of education: Not on file     Highest education level: Not on file   Social Needs     Financial resource strain: Not on file     Food insecurity - worry: Not on file     Food insecurity - inability: Not on file     Transportation needs - medical: Not on file     Transportation needs - non-medical: Not on file   Occupational History     Employer: SELF   Tobacco Use     Smoking status: Former Smoker     Last attempt to quit: 3/17/1993     Years since quittin.9     Smokeless tobacco: Never Used   Substance and Sexual Activity     Alcohol use: Yes     Comment: Occ, Once a month     Drug use: No     Sexual activity: No   Other Topics Concern     Parent/sibling w/ CABG, MI or angioplasty before 65F 55M? Not Asked   Social History Narrative     Not on file               Family History:     Family History   Problem Relation Age of Onset     Family History Negative Mother          88 yo     Cancer Father          76 yo brain     Diabetes Maternal Grandfather          91 yo     Alcohol/Drug Paternal Grandfather                       Allergies:      Allergies   Allergen Reactions     Penicillins Anaphylaxis     \"anaphylactic\"     Sulfa Drugs      \"itchy rash, swelling of face and hands\"     Ancef [Cefazolin] Rash               Medications: "     Prior to Admission medications    Medication Sig Last Dose Taking? Auth Provider   aspirin (ASA) 325 MG EC tablet Take 1 tablet (325 mg) by mouth daily 2/17/2019 at AM Yes Issa Cunha MD   finasteride (PROSCAR) 5 MG tablet Take 5 mg by mouth daily. 2/17/2019 at AM Yes Reported, Patient   fluticasone (FLONASE) 50 MCG/ACT spray Spray 1 spray into both nostrils 2 times daily 2/17/2019 at AM Yes Unknown, Entered By History   ibuprofen (ADVIL/MOTRIN) 200 MG tablet Take 400 mg by mouth every 6 hours as needed for mild pain (knee pain) 2/3/2019 at Unknown time Yes Unknown, Entered By History   lisinopril (PRINIVIL/ZESTRIL) 10 MG tablet TAKE 2 TABLETS (20 MG) BY MOUTH DAILY 2/17/2019 at AM Yes Reported, Patient   loratadine (CLARITIN) 10 MG tablet Take 10 mg by mouth At Bedtime 2/16/2019 at HS Yes Reported, Patient   metFORMIN (GLUCOPHAGE) 1000 MG tablet TAKE 1 TABLET (1,000 MG) BY MOUTH 2 TIMES DAILY (WITH MEALS) **DUE FOR APRIL APPT/LABS. CALL MD** 2/17/2019 at AM Yes Shannon Ta MD   Multiple Vitamins-Minerals (MULTIVITAMIN ADULTS PO) Take 1 tablet by mouth daily  2/17/2019 at AM Yes Reported, Patient   tamsulosin (FLOMAX) 0.4 MG 24 hr capsule Take 1 capsule by mouth daily. 2/17/2019 at AM Yes Reported, Patient              Review of Systems:   A Comprehensive greater than 10 system review of systems was carried out.  Pertinent positives and negatives are noted above.  Otherwise negative for contributory information.     Constitutional, neuro, ENT, endocrine, pulmonary, cardiac, gastrointestinal, genitourinary, musculoskeletal, integument and psychiatric systems are negative, except as otherwise noted.           Physical Exam:   Blood pressure (P) 151/79, pulse 86, temperature 96.5  F (35.8  C), temperature source Oral, resp. rate 22, SpO2 93 %.    GENERAL:  Comfortable.  PSYCH: pleasant, oriented, No acute distress.  HEENT:  Atraumatic, normocephalic. Normal conjunctiva, normal hearing, and  oropharynx is normal.  NECK:  Supple, no neck vein distention  HEART:  Normal S1, S2 with no murmur, no pericardial rub, gallops or S3 or S4.  LUNGS:  Clear to auscultation, normal Respiratory effort. No wheezing, rales or ronchi.  GI:  Soft, normal bowel sounds. Non-tender, non distended.   EXTREMITIES:  No pedal edema, +2 pulses bilateral and equal.  SKIN:  Dry to touch, No rash, wound or ulcerations.  NEUROLOGIC:  CN 2-12 intact, BL 5/5 symmetric upper and lower extremity strength, sensation is intact with no focal deficits.              Data:     EKG demonstrates:  Sinus Rhythm, borderline criteria for LVH, unchanged from previous tracings.    Recent Labs   Lab 02/17/19  0723   WBC 8.9   HGB 11.6*   HCT 38.6*   MCV 92        Recent Labs   Lab 02/17/19  0723      POTASSIUM 4.0   CHLORIDE 110*   CO2 23   ANIONGAP 8   *   BUN 20   CR 1.17   GFRESTIMATED 61   GFRESTBLACK 70   SHANANN 9.0     Recent Labs   Lab 02/17/19  0723   NTBNPI 633     Recent Labs   Lab 02/17/19  0723   DD 2.7*     Recent Labs   Lab 02/17/19  1210 02/17/19  0729 02/17/19  0723   TROPONIN  --  0.03  --    TROPI 0.027  --  0.024       Recent Results (from the past 48 hour(s))   US Lower Extremity Venous Duplex Bilateral    Narrative    VENOUS ULTRASOUND BOTH LEGS  2/17/2019 8:13 AM     HISTORY: lower extremity edema, lower extremity edema, left greater  than right    COMPARISON: None.    FINDINGS: Examination of the deep veins with graded compression and  color flow Doppler with spectral wave form analysis shows  no evidence  of thrombus in the common femoral vein, femoral vein, popliteal vein  or calf veins.  There is no venous insufficiency.      Impression    IMPRESSION: No evidence of deep venous thrombosis in either lower  extremity.    TUCKER BO MD   Chest XR,  PA & LAT    Narrative    CHEST TWO VIEWS   2/17/2019 8:18 AM    HISTORY: Dyspnea, chest pain.    COMPARISON: Radiographs and a CT on 10/17/2018.      Impression     IMPRESSION: Again seen is moderate diffuse interstitial prominence  throughout both lungs. This does not appear significantly changed and  may be a chronic process. No other abnormality or change is seen.     FLORENCE SAMPSON MD   Chest CT, IV contrast only - PE protocol    Narrative    CT CHEST WITH CONTRAST  2/17/2019 9:48 AM    HISTORY: Chest pain and dyspnea with positive D-dimer. Evaluate for  pulmonary embolism.    COMPARISON: A CT on 10/17/2018.    TECHNIQUE: Routine transverse CT imaging of the chest was performed  following the uneventful intravenous administration of 90mL Isovue-370  contrast. A pulmonary embolism protocol was utilized. Radiation dose  for this scan was reduced using automated exposure control, adjustment  of the mA and/or kV according to patient size, or iterative  reconstruction technique.    FINDINGS: The heart size is normal. There are a few mildly enlarged  mediastinal lymph nodes, the largest in the subcarinal location with a  short axis dimension of 1.5 cm. This is similar to the previous  examination. No other abnormal mediastinal mass is demonstrated. There  is calcification within the thoracic aorta. There is very good  opacification of the pulmonary arteries with contrast. No pulmonary  embolism is seen. There has been improvement of what is now only  minimal dependent atelectasis of both lung bases. The lungs are  otherwise clear. No pneumothorax is demonstrated. There are small  bilateral pleural effusions. There are degenerative changes in the  spine. No other osseous abnormality is seen. No chest wall pathology  is seen. The visualized upper abdomen is unremarkable.      Impression    IMPRESSION:   1. No pulmonary embolism is seen.  2. Small bilateral pleural effusions and mild bibasilar lung  atelectasis.  3. No change in mild mediastinal lymphadenopathy.    MD Marcella ARAIZA PA-C

## 2019-02-17 NOTE — PLAN OF CARE
PRIMARY DIAGNOSIS: CHEST PAIN/DYSPNEA  OUTPATIENT/OBSERVATION GOALS TO BE MET BEFORE DISCHARGE:  1. Ruled out acute coronary syndrome (negative or stable Troponin):  Serial trops ordered.    2. Pain Status: Pain free.  3. Appropriate provocative testing performed: No  - Stress Test Procedure: Echo  - Interpretation of cardiac rhythm per telemetry tech: SR w/ HR in 80s    4. Cleared by Consultants (if applicable):N/A  5. Return to near baseline physical activity: Yes, but dyspnea w/ ambulation.  Discharge Planner Nurse   Safe discharge environment identified: Yes  Barriers to discharge: No       Entered by: Tatyana Anne 02/17/2019      A&Ox4. Hypertensive upon arrival but stabilized. Remote tele NSR w/ HR in 80s. O2 sats 93% in bed, dropped to 88% after ambulation. Up w/ SBA, SOB w/ minimal activity. Serial troponins. D-dimer 2.7. Type II diabetes, metformin and diet controlled. BS active x4, tolerating regular diet w/ no caffeine, passing flatus. Voiding in adequate amts. Strict I/O's. Denies pain. BLE edema, RLE moderate 2+ edema, LLE trace 1+ edema. Stress ECHO planned tomorrow. Will continue to monitor.    Please review provider order for any additional goals.   Nurse to notify provider when observation goals have been met and patient is ready for discharge.

## 2019-02-17 NOTE — ED NOTES
Observation Brochure and Video   Patient informed of observation status based on provider's order. Observation Brochure was given and video watched.  Deanne Samson RN

## 2019-02-18 ENCOUNTER — APPOINTMENT (OUTPATIENT)
Dept: CARDIOLOGY | Facility: CLINIC | Age: 74
End: 2019-02-18
Attending: PHYSICIAN ASSISTANT
Payer: COMMERCIAL

## 2019-02-18 ENCOUNTER — APPOINTMENT (OUTPATIENT)
Dept: NUCLEAR MEDICINE | Facility: CLINIC | Age: 74
End: 2019-02-18
Attending: PHYSICIAN ASSISTANT
Payer: COMMERCIAL

## 2019-02-18 VITALS
OXYGEN SATURATION: 92 % | TEMPERATURE: 98.5 F | SYSTOLIC BLOOD PRESSURE: 153 MMHG | HEART RATE: 86 BPM | RESPIRATION RATE: 18 BRPM | DIASTOLIC BLOOD PRESSURE: 80 MMHG

## 2019-02-18 LAB — INTERPRETATION ECG - MUSE: NORMAL

## 2019-02-18 PROCEDURE — 93270 REMOTE 30 DAY ECG REV/REPORT: CPT

## 2019-02-18 PROCEDURE — 93018 CV STRESS TEST I&R ONLY: CPT | Performed by: INTERNAL MEDICINE

## 2019-02-18 PROCEDURE — 25000132 ZZH RX MED GY IP 250 OP 250 PS 637: Performed by: PHYSICIAN ASSISTANT

## 2019-02-18 PROCEDURE — A9502 TC99M TETROFOSMIN: HCPCS | Performed by: INTERNAL MEDICINE

## 2019-02-18 PROCEDURE — 34300033 ZZH RX 343: Performed by: INTERNAL MEDICINE

## 2019-02-18 PROCEDURE — 78452 HT MUSCLE IMAGE SPECT MULT: CPT

## 2019-02-18 PROCEDURE — 99217 ZZC OBSERVATION CARE DISCHARGE: CPT | Performed by: PHYSICIAN ASSISTANT

## 2019-02-18 PROCEDURE — 93272 ECG/REVIEW INTERPRET ONLY: CPT | Performed by: INTERNAL MEDICINE

## 2019-02-18 PROCEDURE — 25000128 H RX IP 250 OP 636

## 2019-02-18 PROCEDURE — 78452 HT MUSCLE IMAGE SPECT MULT: CPT | Mod: 26 | Performed by: INTERNAL MEDICINE

## 2019-02-18 PROCEDURE — G0378 HOSPITAL OBSERVATION PER HR: HCPCS

## 2019-02-18 PROCEDURE — 93017 CV STRESS TEST TRACING ONLY: CPT

## 2019-02-18 RX ORDER — AMINOPHYLLINE 25 MG/ML
50-100 INJECTION, SOLUTION INTRAVENOUS
Status: DISCONTINUED | OUTPATIENT
Start: 2019-02-18 | End: 2019-02-18 | Stop reason: HOSPADM

## 2019-02-18 RX ORDER — ACYCLOVIR 200 MG/1
0-1 CAPSULE ORAL
Status: DISCONTINUED | OUTPATIENT
Start: 2019-02-18 | End: 2019-02-18 | Stop reason: HOSPADM

## 2019-02-18 RX ORDER — REGADENOSON 0.08 MG/ML
INJECTION, SOLUTION INTRAVENOUS
Status: COMPLETED
Start: 2019-02-18 | End: 2019-02-18

## 2019-02-18 RX ORDER — LISINOPRIL 10 MG/1
30 TABLET ORAL DAILY
Qty: 90 TABLET | Refills: 0 | COMMUNITY
Start: 2019-02-18 | End: 2019-03-25

## 2019-02-18 RX ORDER — REGADENOSON 0.08 MG/ML
0.4 INJECTION, SOLUTION INTRAVENOUS ONCE
Status: COMPLETED | OUTPATIENT
Start: 2019-02-18 | End: 2019-02-18

## 2019-02-18 RX ORDER — ALBUTEROL SULFATE 90 UG/1
2 AEROSOL, METERED RESPIRATORY (INHALATION) EVERY 5 MIN PRN
Status: DISCONTINUED | OUTPATIENT
Start: 2019-02-18 | End: 2019-02-18 | Stop reason: HOSPADM

## 2019-02-18 RX ADMIN — LISINOPRIL 20 MG: 20 TABLET ORAL at 07:53

## 2019-02-18 RX ADMIN — METFORMIN HYDROCHLORIDE 1000 MG: 500 TABLET, FILM COATED ORAL at 07:53

## 2019-02-18 RX ADMIN — TETROFOSMIN 10.9 MCI.: 1.38 INJECTION, POWDER, LYOPHILIZED, FOR SOLUTION INTRAVENOUS at 11:29

## 2019-02-18 RX ADMIN — FINASTERIDE 5 MG: 5 TABLET, FILM COATED ORAL at 07:53

## 2019-02-18 RX ADMIN — REGADENOSON 0.4 MG: 0.08 INJECTION, SOLUTION INTRAVENOUS at 13:05

## 2019-02-18 RX ADMIN — TETROFOSMIN 31 MCI.: 1.38 INJECTION, POWDER, LYOPHILIZED, FOR SOLUTION INTRAVENOUS at 13:16

## 2019-02-18 RX ADMIN — ACETAMINOPHEN 650 MG: 325 TABLET, FILM COATED ORAL at 05:05

## 2019-02-18 RX ADMIN — TAMSULOSIN HYDROCHLORIDE 0.4 MG: 0.4 CAPSULE ORAL at 07:52

## 2019-02-18 RX ADMIN — ASPIRIN 325 MG: 325 TABLET, DELAYED RELEASE ORAL at 07:52

## 2019-02-18 NOTE — PLAN OF CARE
PRIMARY DIAGNOSIS: CHEST PAIN  OUTPATIENT/OBSERVATION GOALS TO BE MET BEFORE DISCHARGE:  1. Ruled out acute coronary syndrome (negative or stable Troponin):  Yes, trops 0.024/0.03/0.027  2. Pain Status: Pain free.  3. Appropriate provocative testing performed: N/A  - Stress Test Procedure: Echo stress test in AM    - Interpretation of cardiac rhythm per telemetry tech: SR 90s    4. Cleared by Consultants (if applicable):N/A  5. Return to near baseline physical activity: Yes, ind in room   Pt is A&Ox4. VSS. Temp: 97.6  F (36.4  C) Temp src: Oral BP: 174/88 Pulse: 86 Heart Rate: 101 Resp: 18 SpO2: 92 % O2 Device: Nasal cannula Oxygen Delivery: 2 LPM   Pt on 2L of O2. Pt denies any chest pain. Pt reports mild headache, PRN tylenol given. Pt does report ISBELL. No caffeine diet. Ind in room. Will continue to monitor.     Discharge Planner Nurse   Safe discharge environment identified: Yes  Barriers to discharge: No       Entered by: Namrata Ribeiro 02/18/2019 5:49 AM     Please review provider order for any additional goals.   Nurse to notify provider when observation goals have been met and patient is ready for discharge.

## 2019-02-18 NOTE — PLAN OF CARE
PRIMARY DIAGNOSIS: CHEST PAIN  OUTPATIENT/OBSERVATION GOALS TO BE MET BEFORE DISCHARGE:  1. Ruled out acute coronary syndrome (negative or stable Troponin):  Yes, trops 0.024/0.03/0.027  2. Pain Status: Pain free.  3. Appropriate provocative testing performed: N/A  - Stress Test Procedure: Echo stress test in AM    - Interpretation of cardiac rhythm per telemetry tech: SR 90s    4. Cleared by Consultants (if applicable):N/A  5. Return to near baseline physical activity: Yes, ind in room   Pt is A&Ox4. VSS. Temp: 98.8  F (37.1  C) Temp src: Oral BP: 184/82 Pulse: 86 Heart Rate: 98 Resp: 20 SpO2: 90 % O2 Device: Nasal cannula Oxygen Delivery: 1 LPM   Pt on 1L of O2. Pt denies any pain. Pt does report ISBELL. No caffeine diet. Ind in room. Will continue to monitor.     Discharge Planner Nurse   Safe discharge environment identified: Yes  Barriers to discharge: No       Entered by: Namrata Ribeiro 02/17/2019 8:52 PM     Please review provider order for any additional goals.   Nurse to notify provider when observation goals have been met and patient is ready for discharge.

## 2019-02-18 NOTE — PLAN OF CARE
Patient's After Visit Summary was reviewed with patient.  Patient verbalized understanding of After Visit Summary, recommended follow up and was given an opportunity to ask questions.   Discharge medications sent home with patient/family: Not applicable   Discharged per self.    OBSERVATION patient END time: 4:23 PM

## 2019-02-18 NOTE — PLAN OF CARE
PRIMARY DIAGNOSIS: CHEST PAIN  OUTPATIENT/OBSERVATION GOALS TO BE MET BEFORE DISCHARGE:  1. Ruled out acute coronary syndrome (negative or stable Troponin):  Yes, trops 0.024/0.03/0.027  2. Pain Status: Pain free.  3. Appropriate provocative testing performed: N/A  - Stress Test Procedure: Echo stress test in AM    - Interpretation of cardiac rhythm per telemetry tech: SR 90s    4. Cleared by Consultants (if applicable):N/A  5. Return to near baseline physical activity: Yes, ind in room   Pt is A&Ox4. VSS. Temp: 99.3  F (37.4  C) Temp src: Oral BP: 174/85 Pulse: 86 Heart Rate: 94 Resp: 20 SpO2: 92 % O2 Device: Nasal cannula Oxygen Delivery: 1 LPM   Pt on 1L of O2. Pt denies any pain. Pt does report ISBELL. No caffeine diet. Ind in room. Will continue to monitor.     Discharge Planner Nurse   Safe discharge environment identified: Yes  Barriers to discharge: No       Entered by: Namrata Ribeiro 02/18/2019 1:56 AM     Please review provider order for any additional goals.   Nurse to notify provider when observation goals have been met and patient is ready for discharge.

## 2019-02-18 NOTE — PLAN OF CARE
PRIMARY DIAGNOSIS: CHEST PAIN  OUTPATIENT/OBSERVATION GOALS TO BE MET BEFORE DISCHARGE:  1. Ruled out acute coronary syndrome (negative or stable Troponin):  Trop x 3, stable 0.024/0.03/0.027   2. Pain Status: Pain free.  3. Appropriate provocative testing performed: No  - Stress Test Procedure: Lexiscan  - Interpretation of cardiac rhythm per telemetry tech: SR, HR ranging in 70's and 90's    4. Cleared by Consultants (if applicable):N/A  5. Return to near baseline physical activity: Yes  Discharge Planner Nurse   Safe discharge environment identified: Yes  Barriers to discharge: No       Entered by: Germain Calloway 02/18/2019 8:13 AM     Please review provider order for any additional goals.   Nurse to notify provider when observation goals have been met and patient is ready for discharge.  Patient is alert and orientated. Denies any chest pain. States SOB on exertion, lungs sounds clear. Remains on 2L of O2 with saturations ranging 95%.  Plan to have lexiscan this morning and wean oxygen.

## 2019-02-18 NOTE — PLAN OF CARE
PRIMARY DIAGNOSIS: CHEST PAIN  OUTPATIENT/OBSERVATION GOALS TO BE MET BEFORE DISCHARGE:  Temp: 98.5  F (36.9  C) Temp src: Oral BP: 153/80   Heart Rate: 92 Resp: 18 SpO2: 92 % O2 Device: None (Room air)   1. Ruled out acute coronary syndrome (negative or stable Troponin):  Yes   2. Pain Status: Pain free.  3. Appropriate provocative testing performed: Yes  - Stress Test Procedure: Lexiscan  - Interpretation of cardiac rhythm per telemetry tech: SR HR in the 90's    4. Cleared by Consultants (if applicable):N/A  5. Return to near baseline physical activity: Yes  Discharge Planner Nurse   Safe discharge environment identified: Yes  Barriers to discharge: No       Entered by: Germain Calloway 02/18/2019 2:40 PM     Please review provider order for any additional goals.   Nurse to notify provider when observation goals have been met and patient is ready for discharge.  Patient alert and orientated. Just returned from Lexiscan, back on tele monitoring and will await lexiscan results. Has denied any chest pain, Mild SOB reported. Weaned to RA.

## 2019-02-18 NOTE — PROGRESS NOTES
Pre-procedure:  Are you having any pain or shortness of breath (prior to starting)? n  Initial vital signs: /91, , RR 20  Allergies reviewed: y   Rhythm:   Medications taken within 48 hours of procedure:    Sublingual nitro within the last 48 hours:y  Last Caffeine:   Lung sounds: CTA, no wheezing, crackles or rtx  Health History (COPD, Asthma, etc):            Procedure: Lexiscan  Reaction/symptoms after receiving Trish injection: Shortness of breath  Intensity of Pain:   Rhythm: sr  1. Vital Signs:/78, , RR 22  2. Vital Signs:/80, , RR 20     Reversal agent: N/A    Post:   Resolution of symptoms?: YES  Vital signs: /82, , RR 20  Rhythm: sr  Walk: YES  Comment:   Return to Radiology

## 2019-02-18 NOTE — DISCHARGE SUMMARY
[facility] Outpatient / Observation Unit  Discharge Summary        Lance Ibrahim MRN# [id number]   YOB: 1945 Age: 73 year old     Date of Admission:  2/17/2019  Date of Discharge:  2/18/2019  Admitting Physician:  Tiffanie Barakat MD  Discharge Physician: Mracella House PA-C  Discharging Service: Hospitalist      Primary Provider: Anh Spivey  Primary Care Physician Phone Number: [phone]         Primary Discharge Diagnoses:    Lance Ibrahim was admitted on 2/17/2019 for concerns of acute chest pain and dyspnea on exertion.     1. Chest pain: Ruled out ACS. Serial troponins were detectable but negative. NM Lexiscan obtained, 2 areas of mostly fixed defect noted, inferior and basal anterior defects, likely due to attenuation artifacts but unable to rule out mild ischemia. EF 49%. Pt had echocardiogram in Oct 2018 that showed EF 45-50% and mild hypokinesis of the left ventricle. Pt experienced dyspnea after ambulating in the hallway with palpitations, HR noted in the 120s. Given findings, recommend pt follow up with cardiology as an outpatient, appt was made for Friday, 2/22, at 11:15. Pt discharged home with cardiac event monitor and increased Lisinopril to improve BP control.   2. HTN - BPs remained persistently elevated, -160s while here. On 20 mg PO Lisinopril at home, recommend increasing to 30 mg daily and follow up with PCP and/or cardiology          Secondary Discharge Diagnoses:     Past Medical History:   Diagnosis Date     Arthritis     osteoarthritis knees     Cerebral artery occlusion with cerebral infarction (H)     TIA 1993, no residual     Chronic rhinitis      Diabetes mellitus (H)      HTN (hypertension)      Hypertrophy of prostate with urinary obstruction and other lower urinary tract symptoms (LUTS)      Sleep apnea     doesn't use cpap     TIA (transient ischaemic attack) 1993     Unspecified transient cerebral ischemia 1993    No definite source found                 Code Status:      Full Code        Brief Hospital Summary:        Reason for your hospital stay      Chest pain and dyspnea. Initial work up in the ED was fairly   unremarkable, troponin was detectable but negative. Repeat troponin was   stable. You underwent NM Lexiscan which did show 2 areas of possible   tissue attenuation vs mild ischemia. Dyspnea improved but still present   with exertion, sinus tachycardia also noted with exertion. Given stress   test results, recommend cardiology follow up and cardiac event monitor.   Blood pressures were mildly elevated here, will increase home Lisinopril.               Please refer to initial admission history and physical for further details.   Briefly, Lance Ibrahim was admitted on 2/17/2019 for concerns of acute chest pain.   Initial work up in the ED did not reveal evidence of STEMI or findings consistent with unstable angina or acute coronary ischemia. Pt was registered to the Observation Unit for further evaluation.   Pt ruled out with serial troponins, underwent Lexiscan Stress Thallium test that did not show evidence of significant coronary ischemia but did show 2 areas of mostly fixed defect that is likely due to attenuation artifact vs mild ischemia. Labs were reviewed and significant results addressed. On the day of discharge, pt was pain free, but did have complaints of mild SOB after ambulation and palpitations. Medications were reviewed and adjustments made as necessary. Pt is instructed to follow up as below.           Significant Lab During Hospitalization:      Recent Labs   Lab 02/17/19  0723   WBC 8.9   HGB 11.6*   HCT 38.6*   MCV 92        Recent Labs   Lab 02/17/19  0723      POTASSIUM 4.0   CHLORIDE 110*   CO2 23   ANIONGAP 8   *   BUN 20   CR 1.17   GFRESTIMATED 61   GFRESTBLACK 70   SHANNAN 9.0     Recent Labs   Lab 02/17/19  0723   NTBNPI 633     Recent Labs   Lab 02/17/19  0723   DD 2.7*     Recent Labs   Lab 02/17/19  1210  02/17/19  0729 02/17/19  0723   TROPONIN  --  0.03  --    TROPI 0.027  --  0.024                Significant Imaging During Hospitalization:      Recent Results (from the past 48 hour(s))   US Lower Extremity Venous Duplex Bilateral    Narrative    VENOUS ULTRASOUND BOTH LEGS  2/17/2019 8:13 AM     HISTORY: lower extremity edema, lower extremity edema, left greater  than right    COMPARISON: None.    FINDINGS: Examination of the deep veins with graded compression and  color flow Doppler with spectral wave form analysis shows  no evidence  of thrombus in the common femoral vein, femoral vein, popliteal vein  or calf veins.  There is no venous insufficiency.      Impression    IMPRESSION: No evidence of deep venous thrombosis in either lower  extremity.    TUCKER BO MD   Chest XR,  PA & LAT    Narrative    CHEST TWO VIEWS   2/17/2019 8:18 AM    HISTORY: Dyspnea, chest pain.    COMPARISON: Radiographs and a CT on 10/17/2018.      Impression    IMPRESSION: Again seen is moderate diffuse interstitial prominence  throughout both lungs. This does not appear significantly changed and  may be a chronic process. No other abnormality or change is seen.     FLORENCE SAMPSON MD   Chest CT, IV contrast only - PE protocol    Narrative    CT CHEST WITH CONTRAST  2/17/2019 9:48 AM    HISTORY: Chest pain and dyspnea with positive D-dimer. Evaluate for  pulmonary embolism.    COMPARISON: A CT on 10/17/2018.    TECHNIQUE: Routine transverse CT imaging of the chest was performed  following the uneventful intravenous administration of 90mL Isovue-370  contrast. A pulmonary embolism protocol was utilized. Radiation dose  for this scan was reduced using automated exposure control, adjustment  of the mA and/or kV according to patient size, or iterative  reconstruction technique.    FINDINGS: The heart size is normal. There are a few mildly enlarged  mediastinal lymph nodes, the largest in the subcarinal location with a  short axis  dimension of 1.5 cm. This is similar to the previous  examination. No other abnormal mediastinal mass is demonstrated. There  is calcification within the thoracic aorta. There is very good  opacification of the pulmonary arteries with contrast. No pulmonary  embolism is seen. There has been improvement of what is now only  minimal dependent atelectasis of both lung bases. The lungs are  otherwise clear. No pneumothorax is demonstrated. There are small  bilateral pleural effusions. There are degenerative changes in the  spine. No other osseous abnormality is seen. No chest wall pathology  is seen. The visualized upper abdomen is unremarkable.      Impression    IMPRESSION:   1. No pulmonary embolism is seen.  2. Small bilateral pleural effusions and mild bibasilar lung  atelectasis.  3. No change in mild mediastinal lymphadenopathy.    FLORENCE SAMPSON MD   NM MPI w Lexiscan    Narrative    GATED MYOCARDIAL PERFUSION SCINTIGRAPHY WITH INTRAVENOUS PHARMACOLOGIC  VASODILATATION LEXISCAN -ONE DAY STUDY     2/18/2019 2:04 PM  CURTIS SAHU  73 years  Male  1945.    Indication/Clinical History: Chest pain    Impression  1.  Myocardial perfusion imaging using single isotope technique  demonstrated mostly fixed mild inferior defect. Probably related to  attenuation artifacts, however cannot exclude mild ischemia in this  area. There is a second fixed basal anterior defect consistent with  attenuation artifact. (technically very poor study)  2. Gated images demonstrated mildly dilated LV cavity size and mildly  reduced LV systolic function.  The left ventricular systolic function  is 49%.  3. Compared to the prior study from no prior study .    Procedure  Pharmacologic stress testing was performed with Lexiscan at a rate of  0.08 mg/ml rapid bolus injection, for 15 seconds, 0.4 mg/5ml  intravenously. Low-level exercise was not performed along with the  vasodilator infusion.  The heart rate was 101 at baseline and lupillo  to  114 beats per minute during the Lexiscan infusion. The rest blood  pressure was 172/90 mmHg and was 180/82 mm Hg during Lexiscan  infusion. The patient experienced shortness of breath  during the  test.    Myocardial perfusion imaging was performed at rest, approximately 45  minutes after the injection intravenously of 10.9 mCi of Tc-99m  Myoview. At peak pharmacologic effect, 10-20 seconds after Lexiscan,   the patient was injected intravenously with 31 mCi of  Tc-99m Myoview.  The post-stress tomographic imaging was performed approximately 60  minutes after stress.    EKG Findings  The resting EKG demonstrated sinus rhythm with an incomplete left  bundle branch block. The stress EKG demonstrated no ischemic changes.    Tomographic Findings  Overall, the study quality is technically poor due to significant  visceral radiotracer activity in both the got and liver that likely  causing normalization air in the myocardium this activities adjacent  and overlapping in the inferior wall . On the stress images, mild to  moderate count reduction is seen throughout the inferior wall  extending into the basal inferolateral wall and mild count reduction  in the basal anterior wall. On the rest images, there is slight  reversibility seen in the basal inferolateral wall however there is  also evidence of visceral radiotracer activity near adjacent and  overlapping in this area most prominent in the resting images. Overall  impression is of likely visceral radiotracer attenuation and  normalization air however I cannot entirely excluded area of mild  ischemia due to reversibility. There is similar fixed basal anterior  defect as well . Gated images demonstrated mildly dilated LV cavity  size with end-diastolic volumes measuring 146 and 1 43 mL and rest and  stress respectively. The LV systolic function appears mildly reduced.  The left ventricular ejection fraction was calculated to be 49%. TID  was absent.    MYA  MD MERCEDEZ              Pending Results:        Unresulted Labs Ordered in the Past 30 Days of this Admission     No orders found for last 61 day(s).              Consultations This Hospital Stay:      No consultations were requested during this admission         Discharge Instructions and Follow-Up:      Follow-up Appointments     Follow-up and recommended labs and tests       Follow up with primary care provider, Anh Spivey, within 2 weeks   for hospital follow- up.  No follow up labs or test are needed. Titrate   home BPs as needed.   Consultation with Cardiology recommended, appt set up for Friday 2/22 at   11:15am with Dr. Martines.   14 day cardiac event monitor  Increase home Lisinopril to 30 mg daily.           Pt instructed to follow up with PCP in 7 days and with Consultant if indicated.   Follow-up Labs None        Discharge Disposition:      Discharged to home         Discharge Medications:        Current Discharge Medication List      CONTINUE these medications which have CHANGED    Details   lisinopril (PRINIVIL/ZESTRIL) 10 MG tablet Take 3 tablets (30 mg) by mouth daily  Qty: 90 tablet, Refills: 0         CONTINUE these medications which have NOT CHANGED    Details   aspirin (ASA) 325 MG EC tablet Take 1 tablet (325 mg) by mouth daily  Qty: 90 tablet, Refills: 3    Associated Diagnoses: Status post total right knee replacement      finasteride (PROSCAR) 5 MG tablet Take 5 mg by mouth daily.      fluticasone (FLONASE) 50 MCG/ACT spray Spray 1 spray into both nostrils 2 times daily      ibuprofen (ADVIL/MOTRIN) 200 MG tablet Take 400 mg by mouth every 6 hours as needed for mild pain (knee pain)      loratadine (CLARITIN) 10 MG tablet Take 10 mg by mouth At Bedtime      metFORMIN (GLUCOPHAGE) 1000 MG tablet TAKE 1 TABLET (1,000 MG) BY MOUTH 2 TIMES DAILY (WITH MEALS) **DUE FOR APRIL APPT/LABS. CALL MD**  Qty: 180 tablet, Refills: 1    Associated Diagnoses: Type 2 diabetes mellitus without  "complication, without long-term current use of insulin (H)      Multiple Vitamins-Minerals (MULTIVITAMIN ADULTS PO) Take 1 tablet by mouth daily       tamsulosin (FLOMAX) 0.4 MG 24 hr capsule Take 1 capsule by mouth daily.                 Allergies:         Allergies   Allergen Reactions     Penicillins Anaphylaxis     \"anaphylactic\"     Sulfa Drugs      \"itchy rash, swelling of face and hands\"     Ancef [Cefazolin] Rash           Condition and Physical on Discharge:      Discharge condition: Stable   Vitals: Blood pressure 153/80, pulse 86, temperature 98.5  F (36.9  C), temperature source Oral, resp. rate 18, SpO2 92 %.  0 lbs 0 oz      GENERAL:  Comfortable.  PSYCH: pleasant, oriented, No acute distress..  HEART:  Normal S1, S2 with no murmur, no pericardial rub, gallops or S3 or S4.  LUNGS:  Clear to auscultation, normal Respiratory effort. No wheezing, rales or ronchi.  EXTREMITIES:  Able to ambulate independently.  SKIN:  Dry to touch, No rash, wound or ulcerations.  NEUROLOGIC:  Grossly intact    Marcella House PA-C  "

## 2019-02-21 PROBLEM — R06.00 DYSPNEA: Status: ACTIVE | Noted: 2019-02-17

## 2019-02-22 ENCOUNTER — OFFICE VISIT (OUTPATIENT)
Dept: CARDIOLOGY | Facility: CLINIC | Age: 74
End: 2019-02-22
Payer: COMMERCIAL

## 2019-02-22 ENCOUNTER — HOSPITAL ENCOUNTER (OUTPATIENT)
Facility: CLINIC | Age: 74
End: 2019-02-22
Payer: COMMERCIAL

## 2019-02-22 ENCOUNTER — HOSPITAL ENCOUNTER (OUTPATIENT)
Facility: CLINIC | Age: 74
End: 2019-02-22

## 2019-02-22 VITALS
WEIGHT: 258.5 LBS | BODY MASS INDEX: 35.01 KG/M2 | SYSTOLIC BLOOD PRESSURE: 156 MMHG | HEART RATE: 88 BPM | DIASTOLIC BLOOD PRESSURE: 78 MMHG | HEIGHT: 72 IN

## 2019-02-22 DIAGNOSIS — R06.09 DYSPNEA ON EXERTION: ICD-10-CM

## 2019-02-22 DIAGNOSIS — I42.9 CARDIOMYOPATHY, UNSPECIFIED TYPE (H): ICD-10-CM

## 2019-02-22 DIAGNOSIS — R06.09 DYSPNEA ON EXERTION: Primary | ICD-10-CM

## 2019-02-22 DIAGNOSIS — R07.9 CHEST PAIN, UNSPECIFIED TYPE: ICD-10-CM

## 2019-02-22 PROCEDURE — 99204 OFFICE O/P NEW MOD 45 MIN: CPT | Performed by: INTERNAL MEDICINE

## 2019-02-22 RX ORDER — QUINIDINE GLUCONATE 324 MG
1 TABLET, EXTENDED RELEASE ORAL DAILY
COMMUNITY
End: 2024-07-02

## 2019-02-22 RX ORDER — METOPROLOL SUCCINATE 25 MG/1
25 TABLET, EXTENDED RELEASE ORAL DAILY
Qty: 30 TABLET | Refills: 11 | Status: SHIPPED | OUTPATIENT
Start: 2019-02-22 | End: 2019-02-28

## 2019-02-22 ASSESSMENT — MIFFLIN-ST. JEOR: SCORE: 1950.55

## 2019-02-22 NOTE — LETTER
2/22/2019    Anh Spivey NP  [address]  [facility] [address]    RE: Lance Ibrahim       Dear Colleague,    I had the pleasure of seeing Lance Ibrahim in the [facility].    HPI and Plan:   See dictation    Orders Placed This Encounter   Procedures     Follow-Up with Cardiac Advanced Practice Provider     Follow-Up with Cardiologist       Orders Placed This Encounter   Medications     Ferrous Gluconate 240 (27 Fe) MG TABS     Sig: Take by mouth daily     metoprolol succinate ER (TOPROL-XL) 25 MG 24 hr tablet     Sig: Take 1 tablet (25 mg) by mouth daily     Dispense:  30 tablet     Refill:  11       There are no discontinued medications.      Encounter Diagnoses   Name Primary?     Dyspnea on exertion Yes     Chest pain, unspecified type      Cardiomyopathy, unspecified type (H)        CURRENT MEDICATIONS:  Current Outpatient Medications   Medication Sig Dispense Refill     aspirin (ASA) 325 MG EC tablet Take 1 tablet (325 mg) by mouth daily 90 tablet 3     Ferrous Gluconate 240 (27 Fe) MG TABS Take by mouth daily       finasteride (PROSCAR) 5 MG tablet Take 5 mg by mouth daily.       fluticasone (FLONASE) 50 MCG/ACT spray Spray 1 spray into both nostrils 2 times daily       ibuprofen (ADVIL/MOTRIN) 200 MG tablet Take 400 mg by mouth every 6 hours as needed for mild pain (knee pain)       lisinopril (PRINIVIL/ZESTRIL) 10 MG tablet Take 3 tablets (30 mg) by mouth daily 90 tablet 0     loratadine (CLARITIN) 10 MG tablet Take 10 mg by mouth At Bedtime       metFORMIN (GLUCOPHAGE) 1000 MG tablet TAKE 1 TABLET (1,000 MG) BY MOUTH 2 TIMES DAILY (WITH MEALS) **DUE FOR APRIL APPT/LABS. CALL MD** 180 tablet 1     metoprolol succinate ER (TOPROL-XL) 25 MG 24 hr tablet Take 1 tablet (25 mg) by mouth daily 30 tablet 11     Multiple Vitamins-Minerals (MULTIVITAMIN ADULTS PO) Take 1 tablet by mouth daily        tamsulosin (FLOMAX) 0.4 MG 24 hr capsule Take 1 capsule by mouth daily.    "      ALLERGIES     Allergies   Allergen Reactions     Penicillins Anaphylaxis     \"anaphylactic\"     Sulfa Drugs      \"itchy rash, swelling of face and hands\"     Ancef [Cefazolin] Rash       PAST MEDICAL HISTORY:  Past Medical History:   Diagnosis Date     Arthritis     osteoarthritis knees     Cerebral artery occlusion with cerebral infarction (H)     TIA , no residual     Chronic rhinitis      Diabetes mellitus (H)      Dyspnea 2019     HTN (hypertension)      Hypertrophy of prostate with urinary obstruction and other lower urinary tract symptoms (LUTS)      Sleep apnea     doesn't use cpap     TIA (transient ischaemic attack)      Unspecified transient cerebral ischemia     No definite source found       PAST SURGICAL HISTORY:  Past Surgical History:   Procedure Laterality Date     AMPUTATE TOE(S) Left 10/15/2018    Procedure: AMPUTATE TOE(S);  Left third toe amputation;  Surgeon: Ayaka Azevedo DPM, Podiatry/Foot and Ankle Surgery;  Location: [facility] OR     ARTHROPLASTY KNEE Right 2018    Procedure: Right total knee arthroplasty;  Surgeon: Issa Cunha MD;  Location: [facility] OR     C NONSPECIFIC PROCEDURE      Diastasis recti repair     C NONSPECIFIC PROCEDURE      Ventral hernia repair     C NONSPECIFIC PROCEDURE      ORIF of left shoulder     COLONOSCOPY  3/9/2013    Procedure: COLONOSCOPY;  COLONOSCOPY;  Surgeon: Chau Hogan MD;  Location:  GI     EYE SURGERY  2017    left eye cataract     FOOT SURGERY  2013    cyst removal      HERNIA REPAIR  2004    ventral        FAMILY HISTORY:  Family History   Problem Relation Age of Onset     Family History Negative Mother          88 yo     Cancer Father          74 yo brain     Diabetes Maternal Grandfather          89 yo     Alcohol/Drug Paternal Grandfather                SOCIAL HISTORY:  Social History     Socioeconomic History     Marital status:      Spouse name: None     " Number of children: None     Years of education: None     Highest education level: None   Occupational History     Employer: SELF   Social Needs     Financial resource strain: None     Food insecurity:     Worry: None     Inability: None     Transportation needs:     Medical: None     Non-medical: None   Tobacco Use     Smoking status: Former Smoker     Last attempt to quit: 3/17/1993     Years since quittin.9     Smokeless tobacco: Never Used   Substance and Sexual Activity     Alcohol use: Yes     Comment: Occ, Once a month     Drug use: No     Sexual activity: No   Lifestyle     Physical activity:     Days per week: None     Minutes per session: None     Stress: None   Relationships     Social connections:     Talks on phone: None     Gets together: None     Attends Alevism service: None     Active member of club or organization: None     Attends meetings of clubs or organizations: None     Relationship status: None     Intimate partner violence:     Fear of current or ex partner: None     Emotionally abused: None     Physically abused: None     Forced sexual activity: None   Other Topics Concern     Parent/sibling w/ CABG, MI or angioplasty before 65F 55M? Not Asked   Social History Narrative     None       Review of Systems:  Skin:  Negative       Eyes:  Positive for glasses    ENT:  Positive for hearing loss;sinus trouble;postnasal drainage    Respiratory:  Positive for dyspnea on exertion;sleep apnea with minimal exertion; does not use CPAP   Cardiovascular:    Positive for;palpitations;chest pain;edema of both lower legs  Gastroenterology: Positive for   hx hernia  Genitourinary:  Negative      Musculoskeletal:  Positive for arthritis right knee replacement; arthritis in left knee  Neurologic:  Positive for numbness or tingling of feet    Psychiatric:  Positive for sleep disturbances sleep apnea  Heme/Lymph/Imm:  Negative      Endocrine:  Positive for diabetes      Physical Exam:  Vitals: /78  (BP Location: Right arm, Patient Position: Chair, Cuff Size: Adult Large)   Pulse 88   Ht 1.829 m (6')   Wt 117.3 kg (258 lb 8 oz)   BMI 35.06 kg/m       Constitutional:  cooperative, alert and oriented, well developed, well nourished, in no acute distress obese      Skin:  warm and dry to the touch          Head:  normocephalic        Eyes:  pupils equal and round        Lymph:No Cervical lymphadenopathy present     ENT:  no pallor or cyanosis        Neck:  carotid pulses are full and equal bilaterally, JVP normal, no carotid bruit        Respiratory:  normal breath sounds, clear to auscultation, normal A-P diameter, normal symmetry, normal respiratory excursion, no use of accessory muscles         Cardiac: regular rhythm;normal S1 and S2;no murmurs, gallops or rubs detected   distant heart sounds            pulses full and equal                                        GI:    obese      Extremities and Muscular Skeletal:  no deformities, clubbing, cyanosis, erythema observed   bilateral LE edema;pitting;1+          Neurological:  no gross motor deficits        Psych:  Alert and Oriented x 3;affect appropriate, oriented to time, person and place        CC  No referring provider defined for this encounter.                Service Date: 02/22/2019      REASON FOR CONSULTATION:  Chest pain, shortness of breath on exertion.      HISTORY OF PRESENT ILLNESS:  It was my pleasure to see your patient, Lance Ibrahim.  He is a very pleasant gentleman who a week from Sunday, which is approximately a week and 5 days, he was sitting down and developed retrosternal chest discomfort which he described as a tightening across his chest associated with some shortness of breath.  The discomfort was worrisome enough that he came into the emergency room.  He was administered sublingual nitroglycerin which totally eliminated his pain after approximately 15 minutes.  I could not quite get how long he had the chest discomfort, but I feel it  was approximately half an hour or more.  His 12-lead EKG did not show acute ischemic changes.  He did have a CT scan of his chest which showed no evidence of pulmonary embolism, though a D-dimer was raised.  The troponins were detectable, but were within the normal range.  The first troponin was 0.024, the second troponin was 0.027.        The patient had a nuclear stress test then performed which was equivocal.  This revealed a fixed mild inferior defect, probably related to attenuation artifact, a second fixed basal anterior defect that was also felt to be attenuation artifact.  The study was felt by the reader, Dr. Smith, to be poor quality.        The patient, since that time, has noticed that he gets short of breath when climbing stairs.  His wife is disabled and he goes upstairs to take care of her and previously he could climb those stairs without difficulty.  Now when he climbs stairs, he has to stop and catch his breath.  One of the more important findings was that the nuclear stress test did show that the patient's ejection fraction was not in the normal range.  It was at 49%, which is mildly reduced.  He also had an echocardiogram on 10/18/2018 in the setting of sepsis.  At that stage, his ejection fraction also was not normal.  His EF was 45%-50% with mild global hypokinesis.  So there is evidence of a mild cardiomyopathy of uncertain cause, also.        He is not complaining of arm, throat or jaw discomfort.  His risk factors for coronary artery disease include type 2 diabetes mellitus with peripheral vascular disease.  He also has hypertension and he was a smoker for 25 years.  He has had a past history of TIA also.        Blood pressure today was not normal at 156/78.  Typically, however, blood pressures have been quite well controlled, looking back in November, although 1 or 2 are on the higher range.  On 02/11, his blood pressure was borderline at 138/82.  There is no family history of coronary  artery disease.      IMPRESSION:  Dyspnea on exertion and chest discomfort.  The nuclear stress test was not normal; it was equivocal,  but this patient also has reduced left ventricular systolic function and he has dyspnea on exertion, which is a new symptom over the last 2 weeks.  This is somewhat suspicious for coronary artery disease and an angina equivalent.  He has multiple risk factors for coronary artery disease including peripheral vascular disease, type 2 diabetes mellitus, past history of smoking, obesity and essential hypertension which does not appear to be optimally controlled.      PLAN:  Coronary angiography is indicated in this patient who has angina equivalent type symptoms, abnormal left ventricular systolic function and multiple risk factors for coronary artery disease.  I explained the risks and benefits of the procedure to the patient with the risk of stroke, heart attack, death, bleeding, bruising, hematoma formation, vascular damage, dye allergy, dye nephropathy, the risks associated with conscious sedation including aspiration, intubation, and the risks associated with blood transfusions.  I have also explained the special risks associated with coronary interventions including increased risk of death, myocardial infarction and emergency bypass surgery.  The patient told me he understood why we were doing the procedure.  He told me understood the risks associated with the procedure.  He told me he understood how the procedure was performed and finally he told me that he wished to proceed.      I did tell the patient that there was a significant possibility that he would have to stay overnight.  The patient told me that he would be able to have people take care of his wife while he is in the hospital and also that he would have been people to be able to take him home after his procedure, whether that be 4 hours after an angiogram or overnight after an intervention.  I also explained to him  that bypass surgery may have to be performed and typically that is not done immediately after the angiogram.  He understood all of these factors.      I will start the patient on metoprolol succinate 25 mg per day.  This dose can be titrated to control blood pressure.  Also, I did explain to the patient might require a statin if he is found to have coronary artery disease.  The patient is already on aspirin 325 mg per day.        All the patient's questions were satisfactorily answered.  I will have the patient follow up with one of our physician assistants or nurse practitioners a week after his angiogram and he will follow up with me in 3 months' time.      It is my pleasure to be involved in the care of this extremely nice patient.      cc:      Anh Spivey NP   [facility]   [address] [place] [address]         LARS FUENTES MD, [facility]             D: 2019   T: 2019   MT: EVETTE      Name:     CURTIS SAHU   MRN:      [id number]        Account:      [id number]   :      1945           Service Date: 2019      Document: [id number]         Thank you for allowing me to participate in the care of your patient.      Sincerely,     Lars Martines MD, MD     [facility], [place]    cc:   No referring provider defined for this encounter.

## 2019-02-22 NOTE — PROGRESS NOTES
Service Date: 02/22/2019      REASON FOR CONSULTATION:  Chest pain, shortness of breath on exertion.      HISTORY OF PRESENT ILLNESS:  It was my pleasure to see your patient, Lance Ibrahim.  He is a very pleasant gentleman who a week from Sunday, which is approximately a week and 5 days, he was sitting down and developed retrosternal chest discomfort which he described as a tightening across his chest associated with some shortness of breath.  The discomfort was worrisome enough that he came into the emergency room.  He was administered sublingual nitroglycerin which totally eliminated his pain after approximately 15 minutes.  I could not quite get how long he had the chest discomfort, but I feel it was approximately half an hour or more.  His 12-lead EKG did not show acute ischemic changes.  He did have a CT scan of his chest which showed no evidence of pulmonary embolism, though a D-dimer was raised.  The troponins were detectable, but were within the normal range.  The first troponin was 0.024, the second troponin was 0.027.        The patient had a nuclear stress test then performed which was equivocal.  This revealed a fixed mild inferior defect, probably related to attenuation artifact, a second fixed basal anterior defect that was also felt to be attenuation artifact.  The study was felt by the reader, Dr. Smith, to be poor quality.        The patient, since that time, has noticed that he gets short of breath when climbing stairs.  His wife is disabled and he goes upstairs to take care of her and previously he could climb those stairs without difficulty.  Now when he climbs stairs, he has to stop and catch his breath.  One of the more important findings was that the nuclear stress test did show that the patient's ejection fraction was not in the normal range.  It was at 49%, which is mildly reduced.  He also had an echocardiogram on 10/18/2018 in the setting of sepsis.  At that stage, his ejection fraction also  was not normal.  His EF was 45%-50% with mild global hypokinesis.  So there is evidence of a mild cardiomyopathy of uncertain cause, also.        He is not complaining of arm, throat or jaw discomfort.  His risk factors for coronary artery disease include type 2 diabetes mellitus with peripheral vascular disease.  He also has hypertension and he was a smoker for 25 years.  He has had a past history of TIA also.        Blood pressure today was not normal at 156/78.  Typically, however, blood pressures have been quite well controlled, looking back in November, although 1 or 2 are on the higher range.  On 02/11, his blood pressure was borderline at 138/82.  There is no family history of coronary artery disease.      IMPRESSION:  Dyspnea on exertion and chest discomfort.  The nuclear stress test was not normal; it was equivocal,  but this patient also has reduced left ventricular systolic function and he has dyspnea on exertion, which is a new symptom over the last 2 weeks.  This is somewhat suspicious for coronary artery disease and an angina equivalent.  He has multiple risk factors for coronary artery disease including peripheral vascular disease, type 2 diabetes mellitus, past history of smoking, obesity and essential hypertension which does not appear to be optimally controlled.      PLAN:  Coronary angiography is indicated in this patient who has angina equivalent type symptoms, abnormal left ventricular systolic function and multiple risk factors for coronary artery disease.  I explained the risks and benefits of the procedure to the patient with the risk of stroke, heart attack, death, bleeding, bruising, hematoma formation, vascular damage, dye allergy, dye nephropathy, the risks associated with conscious sedation including aspiration, intubation, and the risks associated with blood transfusions.  I have also explained the special risks associated with coronary interventions including increased risk of death,  myocardial infarction and emergency bypass surgery.  The patient told me he understood why we were doing the procedure.  He told me understood the risks associated with the procedure.  He told me he understood how the procedure was performed and finally he told me that he wished to proceed.      I did tell the patient that there was a significant possibility that he would have to stay overnight.  The patient told me that he would be able to have people take care of his wife while he is in the hospital and also that he would have been people to be able to take him home after his procedure, whether that be 4 hours after an angiogram or overnight after an intervention.  I also explained to him that bypass surgery may have to be performed and typically that is not done immediately after the angiogram.  He understood all of these factors.      I will start the patient on metoprolol succinate 25 mg per day.  This dose can be titrated to control blood pressure.  Also, I did explain to the patient might require a statin if he is found to have coronary artery disease.  The patient is already on aspirin 325 mg per day.        All the patient's questions were satisfactorily answered.  I will have the patient follow up with one of our physician assistants or nurse practitioners a week after his angiogram and he will follow up with me in 3 months' time.      It is my pleasure to be involved in the care of this extremely nice patient.      cc:      Anh Spivey NP   [facility]   [address] [place] [address]         BRANDEN FUENTES MD, [facility]             D: 2019   T: 2019   MT: EVETTE      Name:     CURTIS SAHU   MRN:      [id number]        Account:      [id number]   :      1945           Service Date: 2019      Document: [id number]

## 2019-02-22 NOTE — PROGRESS NOTES
"HPI and Plan:   See dictation    Orders Placed This Encounter   Procedures     Follow-Up with Cardiac Advanced Practice Provider     Follow-Up with Cardiologist       Orders Placed This Encounter   Medications     Ferrous Gluconate 240 (27 Fe) MG TABS     Sig: Take by mouth daily     metoprolol succinate ER (TOPROL-XL) 25 MG 24 hr tablet     Sig: Take 1 tablet (25 mg) by mouth daily     Dispense:  30 tablet     Refill:  11       There are no discontinued medications.      Encounter Diagnoses   Name Primary?     Dyspnea on exertion Yes     Chest pain, unspecified type      Cardiomyopathy, unspecified type (H)        CURRENT MEDICATIONS:  Current Outpatient Medications   Medication Sig Dispense Refill     aspirin (ASA) 325 MG EC tablet Take 1 tablet (325 mg) by mouth daily 90 tablet 3     Ferrous Gluconate 240 (27 Fe) MG TABS Take by mouth daily       finasteride (PROSCAR) 5 MG tablet Take 5 mg by mouth daily.       fluticasone (FLONASE) 50 MCG/ACT spray Spray 1 spray into both nostrils 2 times daily       ibuprofen (ADVIL/MOTRIN) 200 MG tablet Take 400 mg by mouth every 6 hours as needed for mild pain (knee pain)       lisinopril (PRINIVIL/ZESTRIL) 10 MG tablet Take 3 tablets (30 mg) by mouth daily 90 tablet 0     loratadine (CLARITIN) 10 MG tablet Take 10 mg by mouth At Bedtime       metFORMIN (GLUCOPHAGE) 1000 MG tablet TAKE 1 TABLET (1,000 MG) BY MOUTH 2 TIMES DAILY (WITH MEALS) **DUE FOR APRIL APPT/LABS. CALL MD** 180 tablet 1     metoprolol succinate ER (TOPROL-XL) 25 MG 24 hr tablet Take 1 tablet (25 mg) by mouth daily 30 tablet 11     Multiple Vitamins-Minerals (MULTIVITAMIN ADULTS PO) Take 1 tablet by mouth daily        tamsulosin (FLOMAX) 0.4 MG 24 hr capsule Take 1 capsule by mouth daily.         ALLERGIES     Allergies   Allergen Reactions     Penicillins Anaphylaxis     \"anaphylactic\"     Sulfa Drugs      \"itchy rash, swelling of face and hands\"     Ancef [Cefazolin] Rash       PAST MEDICAL HISTORY:  Past " Medical History:   Diagnosis Date     Arthritis     osteoarthritis knees     Cerebral artery occlusion with cerebral infarction (H)     TIA , no residual     Chronic rhinitis      Diabetes mellitus (H)      Dyspnea 2019     HTN (hypertension)      Hypertrophy of prostate with urinary obstruction and other lower urinary tract symptoms (LUTS)      Sleep apnea     doesn't use cpap     TIA (transient ischaemic attack)      Unspecified transient cerebral ischemia     No definite source found       PAST SURGICAL HISTORY:  Past Surgical History:   Procedure Laterality Date     AMPUTATE TOE(S) Left 10/15/2018    Procedure: AMPUTATE TOE(S);  Left third toe amputation;  Surgeon: Ayaka Azevedo DPM, Podiatry/Foot and Ankle Surgery;  Location: [facility] OR     ARTHROPLASTY KNEE Right 2018    Procedure: Right total knee arthroplasty;  Surgeon: Issa Cunha MD;  Location: [facility] OR     C NONSPECIFIC PROCEDURE      Diastasis recti repair     C NONSPECIFIC PROCEDURE      Ventral hernia repair     C NONSPECIFIC PROCEDURE      ORIF of left shoulder     COLONOSCOPY  3/9/2013    Procedure: COLONOSCOPY;  COLONOSCOPY;  Surgeon: Chau Hogan MD;  Location:  GI     EYE SURGERY  2017    left eye cataract     FOOT SURGERY  2013    cyst removal      HERNIA REPAIR  2004    ventral        FAMILY HISTORY:  Family History   Problem Relation Age of Onset     Family History Negative Mother          86 yo     Cancer Father          74 yo brain     Diabetes Maternal Grandfather          89 yo     Alcohol/Drug Paternal Grandfather                SOCIAL HISTORY:  Social History     Socioeconomic History     Marital status:      Spouse name: None     Number of children: None     Years of education: None     Highest education level: None   Occupational History     Employer: SELF   Social Needs     Financial resource strain: None     Food insecurity:     Worry: None      Inability: None     Transportation needs:     Medical: None     Non-medical: None   Tobacco Use     Smoking status: Former Smoker     Last attempt to quit: 3/17/1993     Years since quittin.9     Smokeless tobacco: Never Used   Substance and Sexual Activity     Alcohol use: Yes     Comment: Occ, Once a month     Drug use: No     Sexual activity: No   Lifestyle     Physical activity:     Days per week: None     Minutes per session: None     Stress: None   Relationships     Social connections:     Talks on phone: None     Gets together: None     Attends Mu-ism service: None     Active member of club or organization: None     Attends meetings of clubs or organizations: None     Relationship status: None     Intimate partner violence:     Fear of current or ex partner: None     Emotionally abused: None     Physically abused: None     Forced sexual activity: None   Other Topics Concern     Parent/sibling w/ CABG, MI or angioplasty before 65F 55M? Not Asked   Social History Narrative     None       Review of Systems:  Skin:  Negative       Eyes:  Positive for glasses    ENT:  Positive for hearing loss;sinus trouble;postnasal drainage    Respiratory:  Positive for dyspnea on exertion;sleep apnea with minimal exertion; does not use CPAP   Cardiovascular:    Positive for;palpitations;chest pain;edema of both lower legs  Gastroenterology: Positive for   hx hernia  Genitourinary:  Negative      Musculoskeletal:  Positive for arthritis right knee replacement; arthritis in left knee  Neurologic:  Positive for numbness or tingling of feet    Psychiatric:  Positive for sleep disturbances sleep apnea  Heme/Lymph/Imm:  Negative      Endocrine:  Positive for diabetes      Physical Exam:  Vitals: /78 (BP Location: Right arm, Patient Position: Chair, Cuff Size: Adult Large)   Pulse 88   Ht 1.829 m (6')   Wt 117.3 kg (258 lb 8 oz)   BMI 35.06 kg/m      Constitutional:  cooperative, alert and oriented, well developed,  well nourished, in no acute distress obese      Skin:  warm and dry to the touch          Head:  normocephalic        Eyes:  pupils equal and round        Lymph:No Cervical lymphadenopathy present     ENT:  no pallor or cyanosis        Neck:  carotid pulses are full and equal bilaterally, JVP normal, no carotid bruit        Respiratory:  normal breath sounds, clear to auscultation, normal A-P diameter, normal symmetry, normal respiratory excursion, no use of accessory muscles         Cardiac: regular rhythm;normal S1 and S2;no murmurs, gallops or rubs detected   distant heart sounds            pulses full and equal                                        GI:    obese      Extremities and Muscular Skeletal:  no deformities, clubbing, cyanosis, erythema observed   bilateral LE edema;pitting;1+          Neurological:  no gross motor deficits        Psych:  Alert and Oriented x 3;affect appropriate, oriented to time, person and place        CC  No referring provider defined for this encounter.

## 2019-02-22 NOTE — LETTER
2/22/2019      Anh Spivey NP  [address] [facility] [address]      RE: Lance Ibrahim       Dear Colleague,    I had the pleasure of seeing Lance Ibrahim in the [facility].    Service Date: 02/22/2019      REASON FOR CONSULTATION:  Chest pain, shortness of breath on exertion.      HISTORY OF PRESENT ILLNESS:  It was my pleasure to see your patient, Lance Ibrahim.  He is a very pleasant gentleman who a week from Sunday, which is approximately a week and 5 days, he was sitting down and developed retrosternal chest discomfort which he described as a tightening across his chest associated with some shortness of breath.  The discomfort was worrisome enough that he came into the emergency room.  He was administered sublingual nitroglycerin which totally eliminated his pain after approximately 15 minutes.  I could not quite get how long he had the chest discomfort, but I feel it was approximately half an hour or more.  His 12-lead EKG did not show acute ischemic changes.  He did have a CT scan of his chest which showed no evidence of pulmonary embolism, though a D-dimer was raised.  The troponins were detectable, but were within the normal range.  The first troponin was 0.024, the second troponin was 0.027.        The patient had a nuclear stress test then performed which was equivocal.  This revealed a fixed mild inferior defect, probably related to attenuation artifact, a second fixed basal anterior defect that was also felt to be attenuation artifact.  The study was felt by the reader, Dr. Smith, to be poor quality.        The patient, since that time, has noticed that he gets short of breath when climbing stairs.  His wife is disabled and he goes upstairs to take care of her and previously he could climb those stairs without difficulty.  Now when he climbs stairs, he has to stop and catch his breath.  One of the more important findings was that the nuclear stress test did  show that the patient's ejection fraction was not in the normal range.  It was at 49%, which is mildly reduced.  He also had an echocardiogram on 10/18/2018 in the setting of sepsis.  At that stage, his ejection fraction also was not normal.  His EF was 45%-50% with mild global hypokinesis.  So there is evidence of a mild cardiomyopathy of uncertain cause, also.        He is not complaining of arm, throat or jaw discomfort.  His risk factors for coronary artery disease include type 2 diabetes mellitus with peripheral vascular disease.  He also has hypertension and he was a smoker for 25 years.  He has had a past history of TIA also.        Blood pressure today was not normal at 156/78.  Typically, however, blood pressures have been quite well controlled, looking back in November, although 1 or 2 are on the higher range.  On 02/11, his blood pressure was borderline at 138/82.  There is no family history of coronary artery disease.      IMPRESSION:  Dyspnea on exertion and chest discomfort.  The nuclear stress test was not normal; it was equivocal,  but this patient also has reduced left ventricular systolic function and he has dyspnea on exertion, which is a new symptom over the last 2 weeks.  This is somewhat suspicious for coronary artery disease and an angina equivalent.  He has multiple risk factors for coronary artery disease including peripheral vascular disease, type 2 diabetes mellitus, past history of smoking, obesity and essential hypertension which does not appear to be optimally controlled.      PLAN:  Coronary angiography is indicated in this patient who has angina equivalent type symptoms, abnormal left ventricular systolic function and multiple risk factors for coronary artery disease.  I explained the risks and benefits of the procedure to the patient with the risk of stroke, heart attack, death, bleeding, bruising, hematoma formation, vascular damage, dye allergy, dye nephropathy, the risks  associated with conscious sedation including aspiration, intubation, and the risks associated with blood transfusions.  I have also explained the special risks associated with coronary interventions including increased risk of death, myocardial infarction and emergency bypass surgery.  The patient told me he understood why we were doing the procedure.  He told me understood the risks associated with the procedure.  He told me he understood how the procedure was performed and finally he told me that he wished to proceed.      I did tell the patient that there was a significant possibility that he would have to stay overnight.  The patient told me that he would be able to have people take care of his wife while he is in the hospital and also that he would have been people to be able to take him home after his procedure, whether that be 4 hours after an angiogram or overnight after an intervention.  I also explained to him that bypass surgery may have to be performed and typically that is not done immediately after the angiogram.  He understood all of these factors.      I will start the patient on metoprolol succinate 25 mg per day.  This dose can be titrated to control blood pressure.  Also, I did explain to the patient might require a statin if he is found to have coronary artery disease.  The patient is already on aspirin 325 mg per day.        All the patient's questions were satisfactorily answered.  I will have the patient follow up with one of our physician assistants or nurse practitioners a week after his angiogram and he will follow up with me in 3 months' time.      It is my pleasure to be involved in the care of this extremely nice patient.      cc:      Anh Spivey NP   [facility]   [address] [place] [address]         BRANDEN FUENTES MD, [facility]             D: 02/22/2019   T: 02/22/2019   MT: EVETTE      Name:     CURTIS SAHU   MRN:      [id number]        Account:       [id number]   :      1945           Service Date: 2019      Document: [id number]         Outpatient Encounter Medications as of 2019   Medication Sig Dispense Refill     aspirin (ASA) 325 MG EC tablet Take 1 tablet (325 mg) by mouth daily 90 tablet 3     Ferrous Gluconate 240 (27 Fe) MG TABS Take by mouth daily       finasteride (PROSCAR) 5 MG tablet Take 5 mg by mouth daily.       fluticasone (FLONASE) 50 MCG/ACT spray Spray 1 spray into both nostrils 2 times daily       ibuprofen (ADVIL/MOTRIN) 200 MG tablet Take 400 mg by mouth every 6 hours as needed for mild pain (knee pain)       lisinopril (PRINIVIL/ZESTRIL) 10 MG tablet Take 3 tablets (30 mg) by mouth daily 90 tablet 0     loratadine (CLARITIN) 10 MG tablet Take 10 mg by mouth At Bedtime       metFORMIN (GLUCOPHAGE) 1000 MG tablet TAKE 1 TABLET (1,000 MG) BY MOUTH 2 TIMES DAILY (WITH MEALS) **DUE FOR APRIL APPT/LABS. CALL MD** 180 tablet 1     metoprolol succinate ER (TOPROL-XL) 25 MG 24 hr tablet Take 1 tablet (25 mg) by mouth daily 30 tablet 11     Multiple Vitamins-Minerals (MULTIVITAMIN ADULTS PO) Take 1 tablet by mouth daily        tamsulosin (FLOMAX) 0.4 MG 24 hr capsule Take 1 capsule by mouth daily.       No facility-administered encounter medications on file as of 2019.        Again, thank you for allowing me to participate in the care of your patient.      Sincerely,    Lars Martines MD, MD     [facility]

## 2019-02-23 ENCOUNTER — HOSPITAL ENCOUNTER (EMERGENCY)
Facility: CLINIC | Age: 74
Discharge: HOME | End: 2019-02-24
Attending: EMERGENCY MEDICINE | Admitting: EMERGENCY MEDICINE
Payer: COMMERCIAL

## 2019-02-23 ENCOUNTER — APPOINTMENT (OUTPATIENT)
Dept: CT IMAGING | Facility: CLINIC | Age: 74
End: 2019-02-23
Attending: EMERGENCY MEDICINE
Payer: COMMERCIAL

## 2019-02-23 VITALS
WEIGHT: 255 LBS | TEMPERATURE: 99.5 F | SYSTOLIC BLOOD PRESSURE: 149 MMHG | HEART RATE: 91 BPM | OXYGEN SATURATION: 92 % | DIASTOLIC BLOOD PRESSURE: 82 MMHG | BODY MASS INDEX: 34.58 KG/M2 | RESPIRATION RATE: 20 BRPM

## 2019-02-23 DIAGNOSIS — N23 RENAL COLIC: ICD-10-CM

## 2019-02-23 LAB
ALBUMIN UR-MCNC: 30 MG/DL
ANION GAP SERPL CALCULATED.3IONS-SCNC: 9 MMOL/L (ref 3–14)
APPEARANCE UR: ABNORMAL
BACTERIA #/AREA URNS HPF: ABNORMAL /HPF
BILIRUB UR QL STRIP: NEGATIVE
BUN SERPL-MCNC: 19 MG/DL (ref 7–30)
CALCIUM SERPL-MCNC: 8.7 MG/DL (ref 8.5–10.1)
CHLORIDE SERPL-SCNC: 109 MMOL/L (ref 94–109)
CO2 SERPL-SCNC: 21 MMOL/L (ref 20–32)
COLOR UR AUTO: YELLOW
CREAT SERPL-MCNC: 1.38 MG/DL (ref 0.66–1.25)
ERYTHROCYTE [DISTWIDTH] IN BLOOD BY AUTOMATED COUNT: 16.2 % (ref 10–15)
GFR SERPL CREATININE-BSD FRML MDRD: 50 ML/MIN/{1.73_M2}
GLUCOSE SERPL-MCNC: 138 MG/DL (ref 70–99)
GLUCOSE UR STRIP-MCNC: NEGATIVE MG/DL
HCT VFR BLD AUTO: 38.2 % (ref 40–53)
HGB BLD-MCNC: 11.4 G/DL (ref 13.3–17.7)
HGB UR QL STRIP: ABNORMAL
KETONES UR STRIP-MCNC: NEGATIVE MG/DL
LEUKOCYTE ESTERASE UR QL STRIP: NEGATIVE
MCH RBC QN AUTO: 27.6 PG (ref 26.5–33)
MCHC RBC AUTO-ENTMCNC: 29.8 G/DL (ref 31.5–36.5)
MCV RBC AUTO: 93 FL (ref 78–100)
MUCOUS THREADS #/AREA URNS LPF: PRESENT /LPF
NITRATE UR QL: NEGATIVE
PH UR STRIP: 5 PH (ref 5–7)
PLATELET # BLD AUTO: 345 10E9/L (ref 150–450)
POTASSIUM SERPL-SCNC: 3.9 MMOL/L (ref 3.4–5.3)
RBC # BLD AUTO: 4.13 10E12/L (ref 4.4–5.9)
RBC #/AREA URNS AUTO: >182 /HPF (ref 0–2)
SODIUM SERPL-SCNC: 139 MMOL/L (ref 133–144)
SOURCE: ABNORMAL
SP GR UR STRIP: 1.02 (ref 1–1.03)
SQUAMOUS #/AREA URNS AUTO: <1 /HPF (ref 0–1)
UROBILINOGEN UR STRIP-MCNC: 0 MG/DL (ref 0–2)
WBC # BLD AUTO: 11.8 10E9/L (ref 4–11)
WBC #/AREA URNS AUTO: 4 /HPF (ref 0–5)

## 2019-02-23 PROCEDURE — 81001 URINALYSIS AUTO W/SCOPE: CPT | Performed by: EMERGENCY MEDICINE

## 2019-02-23 PROCEDURE — 96374 THER/PROPH/DIAG INJ IV PUSH: CPT

## 2019-02-23 PROCEDURE — 99285 EMERGENCY DEPT VISIT HI MDM: CPT | Mod: 25

## 2019-02-23 PROCEDURE — 80048 BASIC METABOLIC PNL TOTAL CA: CPT | Performed by: EMERGENCY MEDICINE

## 2019-02-23 PROCEDURE — 85027 COMPLETE CBC AUTOMATED: CPT | Performed by: EMERGENCY MEDICINE

## 2019-02-23 PROCEDURE — 96375 TX/PRO/DX INJ NEW DRUG ADDON: CPT

## 2019-02-23 PROCEDURE — 96361 HYDRATE IV INFUSION ADD-ON: CPT

## 2019-02-23 PROCEDURE — 25000132 ZZH RX MED GY IP 250 OP 250 PS 637: Performed by: EMERGENCY MEDICINE

## 2019-02-23 PROCEDURE — 25000128 H RX IP 250 OP 636: Performed by: EMERGENCY MEDICINE

## 2019-02-23 PROCEDURE — 74176 CT ABD & PELVIS W/O CONTRAST: CPT

## 2019-02-23 PROCEDURE — A9270 NON-COVERED ITEM OR SERVICE: HCPCS | Mod: GY | Performed by: EMERGENCY MEDICINE

## 2019-02-23 RX ORDER — OXYCODONE HYDROCHLORIDE 5 MG/1
5 TABLET ORAL EVERY 4 HOURS PRN
Qty: 14 TABLET | Refills: 0 | Status: ON HOLD | OUTPATIENT
Start: 2019-02-23 | End: 2019-03-05

## 2019-02-23 RX ORDER — SENNOSIDES A AND B 8.6 MG/1
1 TABLET, FILM COATED ORAL 2 TIMES DAILY
Qty: 30 TABLET | Refills: 0 | Status: ON HOLD | OUTPATIENT
Start: 2019-02-23 | End: 2019-03-05

## 2019-02-23 RX ORDER — ONDANSETRON 2 MG/ML
8 INJECTION INTRAMUSCULAR; INTRAVENOUS ONCE
Status: COMPLETED | OUTPATIENT
Start: 2019-02-23 | End: 2019-02-23

## 2019-02-23 RX ORDER — HYDROMORPHONE HYDROCHLORIDE 1 MG/ML
0.5 INJECTION, SOLUTION INTRAMUSCULAR; INTRAVENOUS; SUBCUTANEOUS
Status: DISCONTINUED | OUTPATIENT
Start: 2019-02-23 | End: 2019-02-24 | Stop reason: HOSPADM

## 2019-02-23 RX ORDER — OXYCODONE HYDROCHLORIDE 5 MG/1
5 TABLET ORAL ONCE
Status: COMPLETED | OUTPATIENT
Start: 2019-02-23 | End: 2019-02-23

## 2019-02-23 RX ORDER — ONDANSETRON 4 MG/1
4 TABLET, ORALLY DISINTEGRATING ORAL EVERY 6 HOURS PRN
Qty: 10 TABLET | Refills: 0 | Status: ON HOLD | OUTPATIENT
Start: 2019-02-23 | End: 2019-03-05

## 2019-02-23 RX ADMIN — OXYCODONE HYDROCHLORIDE 5 MG: 5 TABLET ORAL at 23:46

## 2019-02-23 RX ADMIN — Medication 0.5 MG: at 22:54

## 2019-02-23 RX ADMIN — Medication 0.5 MG: at 22:26

## 2019-02-23 RX ADMIN — SODIUM CHLORIDE 1000 ML: 9 INJECTION, SOLUTION INTRAVENOUS at 22:25

## 2019-02-23 RX ADMIN — ONDANSETRON 8 MG: 2 INJECTION INTRAMUSCULAR; INTRAVENOUS at 22:25

## 2019-02-23 ASSESSMENT — ENCOUNTER SYMPTOMS
DYSURIA: 0
HEMATURIA: 0
FEVER: 0
FLANK PAIN: 1

## 2019-02-24 NOTE — ED TRIAGE NOTES
Pt c/o left flank pain. Pt also has been having dark urban colored urine that started around 10am and flank pain started around 6pm. No nausea, fevers. No h/o kidney stones.

## 2019-02-24 NOTE — DISCHARGE INSTRUCTIONS
Please make an appointment to follow up with [facility] [phone] in 3-5 days even if entirely better.    Discharge Instructions  Kidney Stones    Kidney stones are a common problem that can cause a lot of pain but fortunately are usually not dangerous. Kidney stones form in the kidney and then can cause a blockage (obstruction) of the flow of urine from the kidney which leads to pain. Most patients can manage kidney stones at home (without a hospital stay).  However, sometimes your condition may be worse than it seemed at first, or may get worse with time. Most kidney stones will pass on their own, but occasionally stones may need to be removed by an urologist.    Generally, every Emergency Department visit should have a follow-up clinic visit with either a primary or a specialty clinic/provider. Please follow-up as instructed by your emergency provider today.      Return to the Emergency Department if:  Your pain is not controlled despite the medications provided or recommended.  You are vomiting (throwing up) and cannot keep fluids or medications down.  You develop a fever (>100.4 F).  You feel much more ill or develop new symptoms.  What can I do to help myself?  Be sure to drink plenty of fluids.  If instructed to do so, strain your urine (pee) with the urine strainer you were provided with today. Your stone may look like a grain of sand or a small pebble. Collect any stones in the cup provided and bring to your follow-up appointment.  Staying active is good, and may help the stone to pass. You may do whatever you feel up to doing without restrictions.   Treatment:  Non-steroidal anti-inflammatory drugs (NSAIDs). This includes prescription medicines like Toradol  (ketorolac) and non-prescription medicines like Advil  (ibuprofen) and Nuprin  (ibuprofen) and Naproxen. These pain relievers are very effective for kidney stones.  Nausea (sick to your stomach) medication.  Nausea and vomiting are common  with kidney stones, so your provider may send you home with medicine for this.   Flomax  (tamsulosin). This medicine is sometimes used for men with prostate problems, but also can help kidney stones to pass. Its effectiveness is controversial or questionable so it is prescribed in certain situations. This medicine can lower blood pressure, and you may feel faint/lightheaded, especially when you first stand up. Be sure to get up gradually, sit down if you feel faint, and avoid activity where feeling faint would be dangerous, such as climbing ladders.  If you were given a prescription for medicine here today, be sure to read all of the information (including the package insert) that comes with your prescription.  This will include important information about the medicine, its side effects, and any warnings that you need to know about.  The pharmacist who fills the prescription can provide more information and answer questions you may have about the medicine.  If you have questions or concerns that the pharmacist cannot address, please call or return to the Emergency Department.   Remember that you can always come back to the Emergency Department if you are not able to see your regular provider in the amount of time listed above, if you get any new symptoms, or if there is anything that worries you.

## 2019-02-24 NOTE — ED PROVIDER NOTES
History     Chief Complaint:  Flank Pain    HPI   Lance Ibrahim is a 73 year old male who presents to the emergency department today for evaluation of flank pain. The patient reports at 10 am this morning he had some dark urban urine. He was feeling well at that time with no dysuria or urinary frequency. He states now for the past 4 hours he has had severe left flank pain. The pain is sharp, constant, non-radiating and without alleviating or aggravating factors. He took ibuprofen without improvement of pain. The patient denies dysuria and fever. Of note, the patient is scheduled for an angiography on Friday , 3/1/19.     Allergies:  Penicillins  Sulfa drugs  Ancef      Medications:    Aspirin     Ferrous Gluconate 240 (27 Fe)   finasteride (PROSCAR)   fluticasone (FLONASE)  lisinopril (PRINIVIL/ZESTRIL)   loratadine (CLARITIN)   metFORMIN (GLUCOPHAGE)   metoprolol succinate ER (TOPROL-XL)  tamsulosin (FLOMAX)     Past Medical History:    Arthritis   Cerebral artery occlusion with cerebral infarction   Chronic rhinitis   Diabetes mellitus   Dyspnea   HTN (hypertension)   Hypertrophy of prostate with urinary obstruction and other lower urinary tract symptoms (LUTS)   Sleep apnea   TIA (transient ischaemic attack)   Unspecified transient cerebral ischemia     Past Surgical History:    Left third toe amputation  ARTHROPLASTY KNEE   Diastasis recti repair  Ventral hernia repair   ORIF of left shoulder  left eye cataract   cyst removal     Family History:    Father: cancer  Maternal grandfather: diabetes  Paternal grandfather: alcohol/drug    Social History:  Smoking Status: Former Smoker   Years since quittin.9  Smokeless Tobacco: Never Used  Alcohol Use: Positive    Marital Status:        Review of Systems   Constitutional: Negative for fever.   Genitourinary: Positive for flank pain. Negative for dysuria and hematuria.   All other systems reviewed and are negative.      Physical Exam     Patient Vitals for  the past 24 hrs:   BP Temp Temp src Pulse Resp SpO2 Weight   02/23/19 2233 -- -- -- -- -- 96 % --   02/23/19 2232 -- -- -- -- -- 96 % --   02/23/19 2230 (!) 179/97 -- -- 100 -- 95 % --   02/23/19 2226 -- -- -- -- -- 95 % --   02/23/19 2219 (!) 179/97 99.5  F (37.5  C) Oral 100 20 96 % 115.7 kg (255 lb)      Physical Exam    Constitutional:  Pleasant, age appropriate male who appears in discomfort.  HEENT:    Oropharynx is moist  Eyes:    Conjunctiva normal  Neck:    Supple, no meningismus.     CV:     Regular rate and rhythm.      No murmurs, rubs or gallops.     No lower extremity edema.  PULM:    Clear to auscultation bilateral.       No respiratory distress.      Good air exchange.  ABD:    Soft, non-distended.       No abdominal tenderness.     Bowel sounds normal.     No pulsatile masses.       No rebound, guarding or rigidity.     Mild left CVA tenderness.   MSK:     No gross deformity to all four extremities.   LYMPH:   No cervical lymphadenopathy.  NEURO:   Alert.  Good muscular tone, no atrophy.   Skin:    Warm, dry and intact.    Psych:    Mood is good and affect is appropriate.      Emergency Department Course     Imaging:  Radiology findings were communicated with the patient who voiced understanding of the findings.    CT Abdomen Pelvis w/o Contrast  3 mm stone at the ureteropelvic junction on the left with  mild proximal hydronephrosis and stranding. Possible faint tiny mid  ureteral stone on the left.  Reading per radiology     Laboratory:  Laboratory findings were communicated with the patient who voiced understanding of the findings.    CBC: WBC 11.8, HGB 11.4,   BMP: Creat 1.38, Glc 138, GFR 50 o/w WNL    UA with micro: blood large, protein albumin 30, RBC >182, bacteria few, mucous present  o/w negative     Interventions:  2254 Dilaudid 0.5 mg IV  2225 NS 1000 mL IV  2225 Zofran 8 mg IV    Emergency Department Course:    ED Course as of Feb 23 2319   Sat Feb 23, 2019 2217 I performed a  thorough exam of the patient.     2228 IV was inserted and blood was drawn for laboratory testing, results above.     2237 The patient provided a urine sample here in the emergency department. This was sent for laboratory testing, findings above.     2242 The patient was sent for a CT abdomen pelvis while in the emergency department, results above.      2310 Patient rechecked and updated.      2316 I personally reviewed the laboratory and imaging results with the patient and answered all related questions prior to discharge.          Impression & Plan      Medical Decision Making:  Lance Ibrahim is a 73 year old male who presented with unilateral flank pain consistent with renal colic. CT confirms a 3 mm ureteral stone at the UPJ.  Renal function is minimally elevated above baseline.  CT and lab workup show no other alternative etiology that could be causing his symptoms (e.g., AAA, appendicitis, pyelonephritis). There is no fever or convincing evidence of a urinary tract infection. On recheck, his pain is controlled with interventions in the ED and he is tolerating POs. I will prescribe supportive medications. Patient is already on flomax. I have advised him to return for uncontrolled pain, vomiting, fever, or any other concerning symptoms. I also advised to strain his urine to look for a stone and submit it to his primary doctor for lab analysis.  Finally, I have advised follow up with urology within 3-5 days.    Diagnosis:    ICD-10-CM    1. Renal colic N23      Disposition:   Discharge to home    Discharge Medications:  Oxycodone  Senokot  Zofran     Scribe Disclosure:  I, Estee Montes De Oca, am serving as a scribe at 10:14 PM on 2/23/2019 to document services personally performed by Kevin Lowe MD based on my observations and the provider's statements to me.      [facility] EMERGENCY DEPARTMENT       Kevin Lowe MD  02/23/19 4542

## 2019-02-25 DIAGNOSIS — R06.09 DOE (DYSPNEA ON EXERTION): Primary | ICD-10-CM

## 2019-02-25 DIAGNOSIS — R94.39 ABNORMAL STRESS TEST: ICD-10-CM

## 2019-02-25 DIAGNOSIS — R07.9 CHEST PAIN, UNSPECIFIED TYPE: ICD-10-CM

## 2019-02-26 ENCOUNTER — TELEPHONE (OUTPATIENT)
Dept: INTERNAL MEDICINE | Facility: CLINIC | Age: 74
End: 2019-02-26

## 2019-02-26 ENCOUNTER — TELEPHONE (OUTPATIENT)
Dept: CARDIOLOGY | Facility: CLINIC | Age: 74
End: 2019-02-26

## 2019-02-26 DIAGNOSIS — E11.9 TYPE 2 DIABETES MELLITUS WITHOUT COMPLICATION, WITHOUT LONG-TERM CURRENT USE OF INSULIN (H): Primary | ICD-10-CM

## 2019-02-26 DIAGNOSIS — N18.9 ACUTE ON CHRONIC RENAL INSUFFICIENCY: Primary | ICD-10-CM

## 2019-02-26 DIAGNOSIS — N20.0 KIDNEY STONE: ICD-10-CM

## 2019-02-26 DIAGNOSIS — N28.9 ACUTE ON CHRONIC RENAL INSUFFICIENCY: Primary | ICD-10-CM

## 2019-02-26 DIAGNOSIS — R07.9 CHEST PAIN, UNSPECIFIED TYPE: ICD-10-CM

## 2019-02-26 DIAGNOSIS — R06.09 DOE (DYSPNEA ON EXERTION): ICD-10-CM

## 2019-02-26 DIAGNOSIS — R94.39 ABNORMAL STRESS TEST: ICD-10-CM

## 2019-02-26 LAB
ANION GAP SERPL CALCULATED.3IONS-SCNC: 7 MMOL/L (ref 3–14)
APTT PPP: 34 SEC (ref 22–37)
BUN SERPL-MCNC: 28 MG/DL (ref 7–30)
CALCIUM SERPL-MCNC: 8.9 MG/DL (ref 8.5–10.1)
CHLORIDE SERPL-SCNC: 109 MMOL/L (ref 94–109)
CO2 SERPL-SCNC: 23 MMOL/L (ref 20–32)
CREAT SERPL-MCNC: 2.08 MG/DL (ref 0.66–1.25)
ERYTHROCYTE [DISTWIDTH] IN BLOOD BY AUTOMATED COUNT: 16.1 % (ref 10–15)
GFR SERPL CREATININE-BSD FRML MDRD: 30 ML/MIN/{1.73_M2}
GLUCOSE SERPL-MCNC: 173 MG/DL (ref 70–99)
HCT VFR BLD AUTO: 37.1 % (ref 40–53)
HGB BLD-MCNC: 11.1 G/DL (ref 13.3–17.7)
INR PPP: 1.04 (ref 0.86–1.14)
MCH RBC QN AUTO: 28 PG (ref 26.5–33)
MCHC RBC AUTO-ENTMCNC: 29.9 G/DL (ref 31.5–36.5)
MCV RBC AUTO: 94 FL (ref 78–100)
PLATELET # BLD AUTO: 329 10E9/L (ref 150–450)
POTASSIUM SERPL-SCNC: 4.7 MMOL/L (ref 3.4–5.3)
RBC # BLD AUTO: 3.97 10E12/L (ref 4.4–5.9)
SODIUM SERPL-SCNC: 139 MMOL/L (ref 133–144)
WBC # BLD AUTO: 10.8 10E9/L (ref 4–11)

## 2019-02-26 PROCEDURE — 36415 COLL VENOUS BLD VENIPUNCTURE: CPT | Performed by: INTERNAL MEDICINE

## 2019-02-26 PROCEDURE — 80048 BASIC METABOLIC PNL TOTAL CA: CPT | Performed by: INTERNAL MEDICINE

## 2019-02-26 PROCEDURE — 85730 THROMBOPLASTIN TIME PARTIAL: CPT | Performed by: INTERNAL MEDICINE

## 2019-02-26 PROCEDURE — 85610 PROTHROMBIN TIME: CPT | Performed by: INTERNAL MEDICINE

## 2019-02-26 PROCEDURE — 85027 COMPLETE CBC AUTOMATED: CPT | Performed by: INTERNAL MEDICINE

## 2019-02-26 NOTE — TELEPHONE ENCOUNTER
BMP done in preparation for cor angio scheduled 03/01/2019-  BMP results showing Creatinine has increased to 2.08 from 1.37 just 3 days ago.  Pt had presented to ER 02/23/2019 w/ Flank pain and dark urban urine.  CT abd showing 3mm stone ureteropelvic junction with hydronephrosis and stranding.  Discharged on dilaudid, ibuprofen.    Reviewed results with Dr. Martines- referred to PCP.  Called PCP, Anh Spivey NP, office and they will review with MD and call pt with follow up plans.  Will monitor if pt is able to proceed with cor angio on 03/01/2019.  Also noted DDimer elevated to 2.7 @ ER visit 02/17/2019 when pt presented short of breath.  PCP office is aware and will review with MD.

## 2019-02-26 NOTE — TELEPHONE ENCOUNTER
Spoke with patient.   He is continuing to have flank pain and has not yet passed the kidney stone.   He has take two oxycodone tablets at about 1630 today and is a bit more comfortable while lying in bed. He reports that he has two oxycodone tablets remaining.     He denies having any dyspnea or chest discomfort at rest. However, he reports having significant dyspnea with very little walking at all. He remains hopeful that he will be able to proceed with angiography ASA.     Advised the patient that I was made aware of his lab results from today and have initiated contact due to these results.     Advised him that if pain became intolerable or dyspnea worsened, that he should seek ED evaluation tonight.   Otherwise, advised him that I would attempt to communicate with cardiology and with Anh Spivey Wednesday AM.

## 2019-02-26 NOTE — TELEPHONE ENCOUNTER
[facility] requesting new prescription for test strips for once daily testing. Previous prescriptions have been written for BID testing, however, since patient is not on insulin, Medicare guidelines only allow for daily testing.     Test strips are not on current medication list. last ordered 10/1/18 for BID testing.   Order pended for once daily testing, routed to provider to review.

## 2019-02-26 NOTE — TELEPHONE ENCOUNTER
Karoline Salas with [facility] calls. Patient seen today for lab prior to Cardiac Cath scheduled for 3/1/19 for increased SOB. Lab results showed significant increase in creatine in the past 3 days. She contacted Dr. Martines and he advised she notify PCP's office of this for recommendations.  Component      Latest Ref Rng & Units 2/23/2019 2/26/2019   Creatinine      0.66 - 1.25 mg/dL 1.38 (H) 2.08 (H)        Patient seen in ER on 2/23/19, diagnosed with renal colic and discharged home. Karoline states they also noticed that when patient was in the ER on 2/17/19 for shortness of breath he had and elevated d.dimer that has not been commented on by anyone and Dr. Martines did not notice this when patient saw him.     Discussed with Dr. Zendejas, he will contact patient.

## 2019-02-28 ENCOUNTER — TELEPHONE (OUTPATIENT)
Dept: CARDIOLOGY | Facility: CLINIC | Age: 74
End: 2019-02-28

## 2019-02-28 ENCOUNTER — TELEPHONE (OUTPATIENT)
Dept: INTERNAL MEDICINE | Facility: CLINIC | Age: 74
End: 2019-02-28

## 2019-02-28 DIAGNOSIS — N28.9 ACUTE ON CHRONIC RENAL INSUFFICIENCY: ICD-10-CM

## 2019-02-28 DIAGNOSIS — R07.9 CHEST PAIN, UNSPECIFIED TYPE: ICD-10-CM

## 2019-02-28 DIAGNOSIS — N18.9 ACUTE ON CHRONIC RENAL INSUFFICIENCY: ICD-10-CM

## 2019-02-28 DIAGNOSIS — I42.9 CARDIOMYOPATHY, UNSPECIFIED TYPE (H): ICD-10-CM

## 2019-02-28 DIAGNOSIS — R06.09 DYSPNEA ON EXERTION: ICD-10-CM

## 2019-02-28 LAB
ANION GAP SERPL CALCULATED.3IONS-SCNC: 9 MMOL/L (ref 3–14)
BUN SERPL-MCNC: 32 MG/DL (ref 7–30)
CALCIUM SERPL-MCNC: 9.4 MG/DL (ref 8.5–10.1)
CHLORIDE SERPL-SCNC: 109 MMOL/L (ref 94–109)
CO2 SERPL-SCNC: 22 MMOL/L (ref 20–32)
CREAT SERPL-MCNC: 2.15 MG/DL (ref 0.66–1.25)
GFR SERPL CREATININE-BSD FRML MDRD: 29 ML/MIN/{1.73_M2}
GLUCOSE SERPL-MCNC: 136 MG/DL (ref 70–99)
POTASSIUM SERPL-SCNC: 4.8 MMOL/L (ref 3.4–5.3)
SODIUM SERPL-SCNC: 140 MMOL/L (ref 133–144)

## 2019-02-28 PROCEDURE — 36415 COLL VENOUS BLD VENIPUNCTURE: CPT | Performed by: INTERNAL MEDICINE

## 2019-02-28 PROCEDURE — 80048 BASIC METABOLIC PNL TOTAL CA: CPT | Performed by: INTERNAL MEDICINE

## 2019-02-28 RX ORDER — METOPROLOL SUCCINATE 25 MG/1
50 TABLET, EXTENDED RELEASE ORAL DAILY
Qty: 60 TABLET | Refills: 11 | Status: ON HOLD | OUTPATIENT
Start: 2019-02-28 | End: 2019-03-05

## 2019-02-28 RX ORDER — ISOSORBIDE MONONITRATE 30 MG/1
30 TABLET, EXTENDED RELEASE ORAL DAILY
Qty: 30 TABLET | Refills: 11 | Status: SHIPPED | OUTPATIENT
Start: 2019-02-28 | End: 2020-02-20

## 2019-02-28 NOTE — TELEPHONE ENCOUNTER
BMP reviewed showing worsening renal function.  Reviewed with Dr. Martines and coronary angiogram scheduled 3/1 needs to be cancelled. Per Dr. Martines, pt should follow up with [facility]ELY on 3/4 and BMP prior.  Called pt to review.  He expresses understanding, but is wondering if there is anything he can do for the SOB and BP.  Pt states that he bought a new BP cuff recently and the reading have been 170-190/90's.  Reviewed with Dr. Martines and he would like to have pt increase Metoprolol succinate to 50mg daily and start Imdur 30mg.  These recommendations were reviewed with pt and he is in agreement with plan.  New scripts sent to [facility].   Pt is aware that should his symptoms worsen, he should be evaluated in the ER.  KMortimer RN

## 2019-02-28 NOTE — TELEPHONE ENCOUNTER
Received call from Nae at [facility] with an FYI.  Patient was scheduled to have a heart cath tomorrow but it has been canceled as Cr had gone up from previous check.  They will follow up with patient Monday 3/4/19.  KILEY Chavez R.N.

## 2019-03-01 ENCOUNTER — HOSPITAL ENCOUNTER (EMERGENCY)
Facility: CLINIC | Age: 74
Discharge: HOME | End: 2019-03-01
Attending: PHYSICIAN ASSISTANT | Admitting: PHYSICIAN ASSISTANT
Payer: COMMERCIAL

## 2019-03-01 ENCOUNTER — APPOINTMENT (OUTPATIENT)
Dept: CT IMAGING | Facility: CLINIC | Age: 74
End: 2019-03-01
Attending: PHYSICIAN ASSISTANT
Payer: COMMERCIAL

## 2019-03-01 VITALS
DIASTOLIC BLOOD PRESSURE: 90 MMHG | SYSTOLIC BLOOD PRESSURE: 140 MMHG | TEMPERATURE: 98.1 F | BODY MASS INDEX: 33.91 KG/M2 | RESPIRATION RATE: 22 BRPM | HEART RATE: 80 BPM | OXYGEN SATURATION: 93 % | WEIGHT: 250 LBS

## 2019-03-01 DIAGNOSIS — N17.9 ACUTE KIDNEY INJURY (H): ICD-10-CM

## 2019-03-01 DIAGNOSIS — N23 RENAL COLIC: ICD-10-CM

## 2019-03-01 DIAGNOSIS — N30.01 ACUTE CYSTITIS WITH HEMATURIA: ICD-10-CM

## 2019-03-01 LAB
ALBUMIN UR-MCNC: NEGATIVE MG/DL
ANION GAP SERPL CALCULATED.3IONS-SCNC: 8 MMOL/L (ref 3–14)
APPEARANCE UR: ABNORMAL
BACTERIA #/AREA URNS HPF: ABNORMAL /HPF
BASOPHILS # BLD AUTO: 0 10E9/L (ref 0–0.2)
BASOPHILS NFR BLD AUTO: 0.3 %
BILIRUB UR QL STRIP: NEGATIVE
BUN SERPL-MCNC: 37 MG/DL (ref 7–30)
CALCIUM SERPL-MCNC: 8.9 MG/DL (ref 8.5–10.1)
CHLORIDE SERPL-SCNC: 112 MMOL/L (ref 94–109)
CO2 SERPL-SCNC: 22 MMOL/L (ref 20–32)
COLOR UR AUTO: YELLOW
CREAT SERPL-MCNC: 2.21 MG/DL (ref 0.66–1.25)
DIFFERENTIAL METHOD BLD: ABNORMAL
EOSINOPHIL # BLD AUTO: 0.2 10E9/L (ref 0–0.7)
EOSINOPHIL NFR BLD AUTO: 2.3 %
ERYTHROCYTE [DISTWIDTH] IN BLOOD BY AUTOMATED COUNT: 16.2 % (ref 10–15)
GFR SERPL CREATININE-BSD FRML MDRD: 28 ML/MIN/{1.73_M2}
GLUCOSE SERPL-MCNC: 114 MG/DL (ref 70–99)
GLUCOSE UR STRIP-MCNC: NEGATIVE MG/DL
HCT VFR BLD AUTO: 33.4 % (ref 40–53)
HGB BLD-MCNC: 10.5 G/DL (ref 13.3–17.7)
HGB UR QL STRIP: ABNORMAL
HYALINE CASTS #/AREA URNS LPF: 3 /LPF (ref 0–2)
IMM GRANULOCYTES # BLD: 0.1 10E9/L (ref 0–0.4)
IMM GRANULOCYTES NFR BLD: 1 %
KETONES UR STRIP-MCNC: NEGATIVE MG/DL
LEUKOCYTE ESTERASE UR QL STRIP: ABNORMAL
LYMPHOCYTES # BLD AUTO: 1.6 10E9/L (ref 0.8–5.3)
LYMPHOCYTES NFR BLD AUTO: 17.2 %
MCH RBC QN AUTO: 28.8 PG (ref 26.5–33)
MCHC RBC AUTO-ENTMCNC: 31.4 G/DL (ref 31.5–36.5)
MCV RBC AUTO: 92 FL (ref 78–100)
MONOCYTES # BLD AUTO: 0.6 10E9/L (ref 0–1.3)
MONOCYTES NFR BLD AUTO: 6.6 %
MUCOUS THREADS #/AREA URNS LPF: PRESENT /LPF
NEUTROPHILS # BLD AUTO: 6.8 10E9/L (ref 1.6–8.3)
NEUTROPHILS NFR BLD AUTO: 72.6 %
NITRATE UR QL: NEGATIVE
NRBC # BLD AUTO: 0 10*3/UL
NRBC BLD AUTO-RTO: 0 /100
PH UR STRIP: 5 PH (ref 5–7)
PLATELET # BLD AUTO: 368 10E9/L (ref 150–450)
POTASSIUM SERPL-SCNC: 4.8 MMOL/L (ref 3.4–5.3)
RBC # BLD AUTO: 3.64 10E12/L (ref 4.4–5.9)
RBC #/AREA URNS AUTO: >182 /HPF (ref 0–2)
SODIUM SERPL-SCNC: 142 MMOL/L (ref 133–144)
SOURCE: ABNORMAL
SP GR UR STRIP: 1.02 (ref 1–1.03)
UROBILINOGEN UR STRIP-MCNC: 0 MG/DL (ref 0–2)
WBC # BLD AUTO: 9.4 10E9/L (ref 4–11)
WBC #/AREA URNS AUTO: 58 /HPF (ref 0–5)

## 2019-03-01 PROCEDURE — 80048 BASIC METABOLIC PNL TOTAL CA: CPT | Performed by: EMERGENCY MEDICINE

## 2019-03-01 PROCEDURE — 87086 URINE CULTURE/COLONY COUNT: CPT | Performed by: PHYSICIAN ASSISTANT

## 2019-03-01 PROCEDURE — 96375 TX/PRO/DX INJ NEW DRUG ADDON: CPT

## 2019-03-01 PROCEDURE — 85025 COMPLETE CBC W/AUTO DIFF WBC: CPT | Performed by: EMERGENCY MEDICINE

## 2019-03-01 PROCEDURE — 25000128 H RX IP 250 OP 636: Performed by: PHYSICIAN ASSISTANT

## 2019-03-01 PROCEDURE — 96374 THER/PROPH/DIAG INJ IV PUSH: CPT

## 2019-03-01 PROCEDURE — 74176 CT ABD & PELVIS W/O CONTRAST: CPT

## 2019-03-01 PROCEDURE — 25000132 ZZH RX MED GY IP 250 OP 250 PS 637: Mod: GY | Performed by: PHYSICIAN ASSISTANT

## 2019-03-01 PROCEDURE — A9270 NON-COVERED ITEM OR SERVICE: HCPCS | Mod: GY | Performed by: PHYSICIAN ASSISTANT

## 2019-03-01 PROCEDURE — 99285 EMERGENCY DEPT VISIT HI MDM: CPT | Mod: 25

## 2019-03-01 PROCEDURE — 96361 HYDRATE IV INFUSION ADD-ON: CPT

## 2019-03-01 PROCEDURE — 81001 URINALYSIS AUTO W/SCOPE: CPT | Performed by: EMERGENCY MEDICINE

## 2019-03-01 RX ORDER — HYDROMORPHONE HYDROCHLORIDE 1 MG/ML
0.5 INJECTION, SOLUTION INTRAMUSCULAR; INTRAVENOUS; SUBCUTANEOUS ONCE
Status: COMPLETED | OUTPATIENT
Start: 2019-03-01 | End: 2019-03-01

## 2019-03-01 RX ORDER — OXYCODONE HYDROCHLORIDE 5 MG/1
5 TABLET ORAL EVERY 6 HOURS PRN
Qty: 8 TABLET | Refills: 0 | Status: SHIPPED | OUTPATIENT
Start: 2019-03-01 | End: 2019-03-25

## 2019-03-01 RX ORDER — ALFUZOSIN HYDROCHLORIDE 10 MG/1
10 TABLET, EXTENDED RELEASE ORAL ONCE
Status: DISCONTINUED | OUTPATIENT
Start: 2019-03-01 | End: 2019-03-01

## 2019-03-01 RX ORDER — ONDANSETRON 2 MG/ML
4 INJECTION INTRAMUSCULAR; INTRAVENOUS ONCE
Status: COMPLETED | OUTPATIENT
Start: 2019-03-01 | End: 2019-03-01

## 2019-03-01 RX ORDER — NITROFURANTOIN 25; 75 MG/1; MG/1
100 CAPSULE ORAL 2 TIMES DAILY
Qty: 10 CAPSULE | Refills: 0 | Status: SHIPPED | OUTPATIENT
Start: 2019-03-01 | End: 2019-03-25

## 2019-03-01 RX ORDER — NITROFURANTOIN 25; 75 MG/1; MG/1
100 CAPSULE ORAL ONCE
Status: COMPLETED | OUTPATIENT
Start: 2019-03-01 | End: 2019-03-01

## 2019-03-01 RX ADMIN — ONDANSETRON 4 MG: 2 INJECTION INTRAMUSCULAR; INTRAVENOUS at 11:51

## 2019-03-01 RX ADMIN — Medication 0.5 MG: at 11:51

## 2019-03-01 RX ADMIN — NITROFURANTOIN (MONOHYDRATE/MACROCRYSTALS) 100 MG: 75; 25 CAPSULE ORAL at 13:49

## 2019-03-01 RX ADMIN — SODIUM CHLORIDE 1000 ML: 9 INJECTION, SOLUTION INTRAVENOUS at 11:51

## 2019-03-01 NOTE — DISCHARGE INSTRUCTIONS
Continue taking Oxycodone for pain.   You are about to pass this stone! Watch for the stone when you pass urine.   Call urologic physicians and make an appointment for follow up.   Drink lots of fluids.   Begin Macrobid for infection.   Discharge Instructions  Kidney Stones    Kidney stones are a common problem that can cause a lot of pain but fortunately are usually not dangerous. Kidney stones form in the kidney and then can cause a blockage (obstruction) of the flow of urine from the kidney which leads to pain. Most patients can manage kidney stones at home (without a hospital stay).  However, sometimes your condition may be worse than it seemed at first, or may get worse with time. Most kidney stones will pass on their own, but occasionally stones may need to be removed by an urologist.    Generally, every Emergency Department visit should have a follow-up clinic visit with either a primary or a specialty clinic/provider. Please follow-up as instructed by your emergency provider today.      Return to the Emergency Department if:  Your pain is not controlled despite the medications provided or recommended.  You are vomiting (throwing up) and cannot keep fluids or medications down.  You develop a fever (>100.4 F).  You feel much more ill or develop new symptoms.  What can I do to help myself?  Be sure to drink plenty of fluids.  If instructed to do so, strain your urine (pee) with the urine strainer you were provided with today. Your stone may look like a grain of sand or a small pebble. Collect any stones in the cup provided and bring to your follow-up appointment.  Staying active is good, and may help the stone to pass. You may do whatever you feel up to doing without restrictions.   Treatment:  Non-steroidal anti-inflammatory drugs (NSAIDs). This includes prescription medicines like Toradol  (ketorolac) and non-prescription medicines like Advil  (ibuprofen) and Nuprin  (ibuprofen) and Naproxen. These pain  relievers are very effective for kidney stones.  Nausea (sick to your stomach) medication.  Nausea and vomiting are common with kidney stones, so your provider may send you home with medicine for this.   Flomax  (tamsulosin). This medicine is sometimes used for men with prostate problems, but also can help kidney stones to pass. Its effectiveness is controversial or questionable so it is prescribed in certain situations. This medicine can lower blood pressure, and you may feel faint/lightheaded, especially when you first stand up. Be sure to get up gradually, sit down if you feel faint, and avoid activity where feeling faint would be dangerous, such as climbing ladders.  If you were given a prescription for medicine here today, be sure to read all of the information (including the package insert) that comes with your prescription.  This will include important information about the medicine, its side effects, and any warnings that you need to know about.  The pharmacist who fills the prescription can provide more information and answer questions you may have about the medicine.  If you have questions or concerns that the pharmacist cannot address, please call or return to the Emergency Department.   Remember that you can always come back to the Emergency Department if you are not able to see your regular provider in the amount of time listed above, if you get any new symptoms, or if there is anything that worries you.

## 2019-03-01 NOTE — ED TRIAGE NOTES
Dx with kidney stone on Sunday.  Was feeling good this week.  This AM, increased left flank pain.  ABCDs intact.

## 2019-03-01 NOTE — ED PROVIDER NOTES
History     Chief Complaint:  Flank pain, left    HPI   Curtis Ibrahim is a 73 year old male with a history of hypertension and diabetes who presents for evaluation of left flank pain. The patient was seen at [facility] five days ago, diagnosed with a left UVJ 3 mm stone, and he was told he would pass this. He was given Zofran and Oxycodone for symptomatic control and continued to follow with primary care. However, the patient was supposed to have a coronary angiogram for a chest pain/dysnpea work-up that was otherwise unremarkable from a hospitalization on 02/17/19. Given that creatinine continued to rise, the angiogram was canceled. The patient then began to develop left flank pain this morning at around 0845. He had not had much pain over this past week, and actually had stopped taking Oxycodone until last night, when he had some mild flank discomfort. He denies nausea or vomiting. He denies hematuria, frequency, urgency, or dysuria. Of note, per the recommendations of cardiology, the patient began taking Imdur and an increased dose of 50 mg Metoprolol daily. He denies any significant chest pain or dyspnea currently. Patient alfonso significant abdominal pain and he denies black or blood stools. No fevers/chills.     BMP obtained 2/28/2019:  Glucose 136 (H), Creatinine 2.15 (H), Urea nitrogen 32 (H), GFR 29 (L)    CT ABDOMEN AND PELVIS WITHOUT CONTRAST 2/23/2019 10:45 PM   3 mm stone at the ureteropelvic junction on the left with  mild proximal hydronephrosis and stranding. Possible faint tiny mid  ureteral stone on the left.     CURTIS MENG MD    Allergies:  Penicillins  Sulfa Drugs  Ancef [Cefazolin]      Medications:    Aspirin  Ibuprofen  Lisinopril  Claritin  Toprol-XL  Senna-docusate  Flomax    Past Medical History:    Type 2 diabetes  Hypertension   TIA  Cardiomyopathy  Sepsis  Cervicalgia  BPH  Gout  Chronic rhinitis   Sleep apnea    Past Surgical History:    Left toe amputation  Diastasis recti  repair  Right knee arthroplasty  Left eye surgery  ORIF left shoulder  Ventral hernia repair     Family History:    History reviewed. No pertinent family history.      Social History:  Smoking Status: Former Smoker  Alcohol Use: Yes  Marital Status:        Review of Systems  10 point review of systems performed and is negative except as above and in HPI.     Physical Exam   Vitals:  Patient Vitals for the past 24 hrs:   BP Temp Temp src Pulse Resp SpO2 Weight   03/01/19 1314 -- 98.1  F (36.7  C) Oral -- -- -- --   03/01/19 1103 140/90 98.3  F (36.8  C) Temporal 80 22 93 % 113.4 kg (250 lb)     Physical Exam  General: Alert and interactive. Appears uncomfortable and anxious. Cooperative and pleasant.   Eyes: The pupils are equal and round. EOMs intact. No scleral icterus.  ENT: No abnormalities to the external nose or ears. Mucous membranes moist. Posterior oropharynx is non-erythematous.      Neck: Trachea is in the midline. No nuchal rigidity.     CV: Regular rate and rhythm. S1 and S2 normal without murmur, click, gallop or rub.   Resp: Breath sounds are clear bilaterally, without rhonchi, wheezes, rales. Non-labored, no retractions or accessory muscle use.     GI: Abdomen is soft without distension. No tenderness to palpation in the anterior abdomen, but positive left CVA tenderness.   MS: Moving all extremities well. Good muscle tone.   Skin: Warm and dry. No rash or lesions noted.  Neuro: Alert and oriented x 3. No focal neurologic deficits. Good strength and sensation in upper and lower extremities.    Psych: Awake. Alert.  Normal affect. Appropriate interactions.  Lymph: No anterior or posterior cervical lymphadenopathy noted.    Emergency Department Course     Imaging:  Radiographic findings were communicated with the patient who voiced understanding of the findings.    CT-scan Abdomen/Pelvis w/o contrast:  1. 0.3 cm distal left ureter stone has moved to this position from the  proximal left ureter  since 2/3/2019. Continued mild left  hydronephrosis appears stable in severity.  2. Stable nonobstructing stone at the left kidney.  3. Small bibasilar pleural effusions are slightly larger.  Preliminary result per radiology.      Laboratory:  CBC: HGB 10.5 (L), o/w WNL (WBC 9.4, )   BMP:Chloride 112 (H), Glucose 114 (H), BUN 37 (H),  WNL Creatinine 2.21 (H), GFR 28 (L), o/w WNL  UA: Large blood, leukocyte esterase moderate, WBC 58 (H), RBC >182 (H), Few bacteria, mucous present, hyaline casts 3 (H), o/w negative    Interventions:  1151 - NS 1L IV Bolus   1151 - Dilaudid 0.5 mg IV  1151 - Zofran 4mg IV injection    1349 - Macrobid 100 mg PO     Emergency Department Course:  Past medical records, nursing notes, and vitals reviewed   I performed an exam of the patient and obtained history, as documented above.     IV inserted and blood drawn.     The patient was sent for a CT-scan while in the emergency department, findings above.      1259: I discussed the case with Hien tate for Urology regarding the patient.     I then discussed antibiotic choice with pharmacy, given that urology suggested Macrobid for a patient with renal dysfunction. This was okayed by pharmacy given CrCl > 30.     I rechecked the patient.  Findings and plan explained to the Patient. Patient discharged home with instructions regarding supportive care, medications, and reasons to return. The importance of close follow-up was reviewed.      Impression & Plan    Medical Decision Making:  Lance Ibrahim is a 73 year old male who presents with left renal colic in the setting of known 0.3 mm left ureteral stone. The patient has had climbing creatinine over the past few days, and his coronary angiogram was canceled today due to renal dysfunction. The patient's creatinine was 1.38 on 02/23/19 at his last visit, and at follow up was 2/26 and then 2.15 yesterday. Today, the patient's Creatinine was 2.21. Repeat CT scan shows 0.3 mm stone in the  distal ureter without increasing signs of obstruction. The patient's urine now shows pyuria, with positive leukocyte esterase (this was negative on 02/23). I consulted with urology, who suggested that if the patient's pain is under good control, he should be given antibiotics and can be sent home for outpatient follow up, even despite worsening renal dysfunction, as his kidney stone has made good progress and 90% chance that he will pass this naturally. Given anaphylaxis to penicillin, urology suggested Macrobid. Urine culture sent. Given renal dysfunction, consulted with pharmacy, who suggested that Macrobid was okay with a creatinine clearance greater than 30. WBC count is 9.4, reassuring, and without fever, I don't believe that he requires admission to the hospital at this time. He was given one liter of fluid here and I encouraged continued Flomax at home and drinking lots of fluids prior to follow up with primary care and urology. He will return here for uncontrolled pain, intractable vomiting, confusion, fevers, or other complaints. He voiced understanding of this. Otherwise, pain is well controlled and he was given a refill of Oxycodone for pain management at home. It is likely that he will pass this stone soon.     Diagnosis:  Final diagnoses:   Renal colic   Acute kidney injury (H)   Acute cystitis with hematuria     Discharge Medications:  Macrobid 100 mg BID x 5 days  Oxycodone        Nalini Lerner PA-C  03/01/19 0236

## 2019-03-02 ENCOUNTER — APPOINTMENT (OUTPATIENT)
Dept: LAB | Facility: CLINIC | Age: 74
End: 2019-03-02
Payer: COMMERCIAL

## 2019-03-02 LAB
BACTERIA SPEC CULT: NO GROWTH
Lab: NORMAL
SPECIMEN SOURCE: NORMAL

## 2019-03-02 PROCEDURE — 82365 CALCULUS SPECTROSCOPY: CPT | Mod: 90 | Performed by: NURSE PRACTITIONER

## 2019-03-02 PROCEDURE — 99000 SPECIMEN HANDLING OFFICE-LAB: CPT | Performed by: NURSE PRACTITIONER

## 2019-03-04 ENCOUNTER — TELEPHONE (OUTPATIENT)
Dept: CARDIOLOGY | Facility: CLINIC | Age: 74
End: 2019-03-04

## 2019-03-04 ENCOUNTER — OFFICE VISIT (OUTPATIENT)
Dept: CARDIOLOGY | Facility: CLINIC | Age: 74
End: 2019-03-04
Payer: COMMERCIAL

## 2019-03-04 VITALS
HEART RATE: 76 BPM | BODY MASS INDEX: 34.65 KG/M2 | WEIGHT: 255.8 LBS | DIASTOLIC BLOOD PRESSURE: 74 MMHG | SYSTOLIC BLOOD PRESSURE: 158 MMHG | HEIGHT: 72 IN

## 2019-03-04 DIAGNOSIS — R06.02 SHORTNESS OF BREATH: ICD-10-CM

## 2019-03-04 DIAGNOSIS — R94.39 ABNORMAL STRESS TEST: Primary | ICD-10-CM

## 2019-03-04 DIAGNOSIS — R06.09 DYSPNEA ON EXERTION: Primary | ICD-10-CM

## 2019-03-04 DIAGNOSIS — Z79.4 TYPE 2 DIABETES MELLITUS WITH HYPEROSMOLARITY WITHOUT COMA, WITH LONG-TERM CURRENT USE OF INSULIN (H): ICD-10-CM

## 2019-03-04 DIAGNOSIS — I99.8 OTHER DISORDER OF CIRCULATORY SYSTEM: ICD-10-CM

## 2019-03-04 DIAGNOSIS — R94.39 ABNORMAL STRESS TEST: ICD-10-CM

## 2019-03-04 DIAGNOSIS — E11.00 TYPE 2 DIABETES MELLITUS WITH HYPEROSMOLARITY WITHOUT COMA, WITH LONG-TERM CURRENT USE OF INSULIN (H): ICD-10-CM

## 2019-03-04 DIAGNOSIS — N20.0 KIDNEY STONE: ICD-10-CM

## 2019-03-04 DIAGNOSIS — I10 BENIGN ESSENTIAL HYPERTENSION: ICD-10-CM

## 2019-03-04 DIAGNOSIS — R06.09 DOE (DYSPNEA ON EXERTION): ICD-10-CM

## 2019-03-04 DIAGNOSIS — R07.9 CHEST PAIN, UNSPECIFIED TYPE: ICD-10-CM

## 2019-03-04 DIAGNOSIS — N23 RENAL COLIC: ICD-10-CM

## 2019-03-04 LAB
ANION GAP SERPL CALCULATED.3IONS-SCNC: 6 MMOL/L (ref 3–14)
APTT PPP: 35 SEC (ref 22–37)
BUN SERPL-MCNC: 24 MG/DL (ref 7–30)
CALCIUM SERPL-MCNC: 8.6 MG/DL (ref 8.5–10.1)
CHLORIDE SERPL-SCNC: 110 MMOL/L (ref 94–109)
CO2 SERPL-SCNC: 22 MMOL/L (ref 20–32)
CREAT SERPL-MCNC: 1.37 MG/DL (ref 0.66–1.25)
GFR SERPL CREATININE-BSD FRML MDRD: 50 ML/MIN/{1.73_M2}
GLUCOSE SERPL-MCNC: 163 MG/DL (ref 70–99)
INR PPP: 1.13 (ref 0.86–1.14)
POTASSIUM SERPL-SCNC: 4.1 MMOL/L (ref 3.4–5.3)
SODIUM SERPL-SCNC: 138 MMOL/L (ref 133–144)

## 2019-03-04 PROCEDURE — 99214 OFFICE O/P EST MOD 30 MIN: CPT | Performed by: NURSE PRACTITIONER

## 2019-03-04 PROCEDURE — 36415 COLL VENOUS BLD VENIPUNCTURE: CPT | Performed by: INTERNAL MEDICINE

## 2019-03-04 PROCEDURE — 85610 PROTHROMBIN TIME: CPT | Performed by: INTERNAL MEDICINE

## 2019-03-04 PROCEDURE — 80048 BASIC METABOLIC PNL TOTAL CA: CPT | Performed by: INTERNAL MEDICINE

## 2019-03-04 PROCEDURE — 85730 THROMBOPLASTIN TIME PARTIAL: CPT | Performed by: INTERNAL MEDICINE

## 2019-03-04 RX ORDER — LIDOCAINE 40 MG/G
CREAM TOPICAL
Status: CANCELLED | OUTPATIENT
Start: 2019-03-04

## 2019-03-04 RX ORDER — FUROSEMIDE 20 MG
20 TABLET ORAL DAILY
Qty: 30 TABLET | Refills: 0 | Status: ON HOLD | OUTPATIENT
Start: 2019-03-04 | End: 2019-03-05

## 2019-03-04 RX ORDER — ASPIRIN 81 MG/1
325 TABLET ORAL DAILY
Status: CANCELLED | OUTPATIENT
Start: 2019-03-04

## 2019-03-04 RX ORDER — POTASSIUM CHLORIDE 750 MG/1
20 TABLET, EXTENDED RELEASE ORAL
Status: CANCELLED | OUTPATIENT
Start: 2019-03-04

## 2019-03-04 RX ORDER — SODIUM CHLORIDE 9 MG/ML
INJECTION, SOLUTION INTRAVENOUS CONTINUOUS
Status: CANCELLED | OUTPATIENT
Start: 2019-03-04

## 2019-03-04 ASSESSMENT — MIFFLIN-ST. JEOR: SCORE: 1938.3

## 2019-03-04 NOTE — TELEPHONE ENCOUNTER
Left Coronary Angiogram    Scheduled: 03-05-19  Location: [facility]   Check-in time: 10 am   Procedure time: 12 noon     Instructions ....    NPO after midnight, the night before the procedure.     Medications that can be taken on the morning of the procedure (with small sips of water): ASA, Imdur, Lisinopril, Toprol XL       Patient should take his other medications when he returns home.      Patient should hold his Metformin on the morning of the procedure until 03-07-19.     Patient will need a ride home, and a person to stay with him for 24 hours after the procedure.      ----------------------------------------------------------------------------------------------------------------------    Prep instructions were reviewed with patient over the phone. A BMP is scheduled for 03-07-19. Patient is aware that he will receive a call on 03-07-19 from a nurse (Team 5), to review his BMP results and to let him know if he can resume his Metformin. Patient had no questions. Messaged Team 5 to watch for results.  Charisse PAULA

## 2019-03-04 NOTE — PATIENT INSTRUCTIONS
Thanks for coming into [facility] clinic today.    Persistent shortness of breath  Creatinine much improved after passing kidney stone.   Will add low dose lasix 20mg daily  Monitor for any worsening symptoms. If symptoms become severe seek ER evaluation.   Rescheduled coronary angiogram   Follow up post Angiogram     ANGIOGRAM  [facility]   [place], [place]   Contact [facility] scheduling if needed at [phone], Option#2 or [phone].      1. In preparation for your procedure, we require that you do the following:    a. Nothing to eat or drink after midnight if your procedure is before 12 noon.  (If your procedure is scheduled after 12 noon, you may have a clear liquid  breakfast before 8:00am, then nothing else to eat or drink. See list below.)     b. Take your usual morning medications with a small sip of water on the day of your procedure unless you are on the following medications:    Aspirin:  o If you currently take Aspirin, continue taking your same dose.  o If you do not take Aspirin, take 325mg of Aspirin the day before and the morning of the procedure.    Coumadin or Warfarin:  o Stop Coumadin or Warfarin on:  __________________________    Pradaxa or Xarelto:  o Stop Pradaxa or Xarelto on:  ______________________________    Diabetic:  o If you take Glucophage (metformin) do not take the morning of the procedure or for 2 days after the procedure. Contact your Primary Care MD regarding glucose control for the days you do not take Glucophage.  o If you are on other oral diabetic medications do not take on the morning of the procedure.  o If you take Insulin contact your Primary Care MD for the recommended dosage to take the morning of the procedure.    Diuretic (Water Pill):  o If you take a diuretic (water pill), do not take the morning of the procedure.    c. If you have an allergy to dye, please contact the [facility] office 4 days  "before your procedure at [phone] option 2, and ask for a nurse.    2. You will be unable to drive after your procedure; please arrange to have someone drive you home the day of your procedure. You will also need to make sure that there is a responsible adult with you for 24 hours after your procedure. Your procedure will be cancelled if you do not have transportation home or someone to stay with you for 24 hrs.     3. Your procedure will be done at [facility]. Please park in the  Skyway Ramp  on the west side of [facility] on [address]. Take the skyway over [facility] to the hospital. Please check in on the first floor, \"Skyway Welcome Desk\" which is one floor down from the skyway level.     4. If you have any questions about your upcoming procedure, please contact the nurse at [facility] at [phone] or the nurse at [facility] at [phone], option#2.  02/20/2013 S17      Please call my nurse at [phone] with any questions or concerns.    Scheduling phone number: [phone]  Reminder: Please bring in all current medications, over the counter supplements and vitamin bottles to your next appointment.        "

## 2019-03-04 NOTE — PROGRESS NOTES
Cardiology Clinic Progress Note  Lance Ibrahim MRN# [id number]   YOB: 1945 Age: 73 year old     Reason For Visit:  Shortness of breath    Primary Cardiologist:   Dr. Carla Lai           History of Presenting Illness:    Lance Ibrahim is a pleasant 73 year old patient who carries a past medical history significant for diabetes Type 2, PVD hypertension, TIA, gout and obesity.     He was last seen on 2/22/19,  in follow up to an ER evaluation due to chest pain and shortness of breath. Cardiac workup consisted of a nuclear stress test that was equivocal and elevated troponin that peaked at 0.027. He was referred for a coronary angiogram, scheduled for 3/1/19. Unfortunately, this was cancelled due to an episode of acute left renal colic. CT confirmed a 0.3mm left ureteral stone, creatinine elevated to 2.21. He was consulted by urology who initiated him on Macrobid and oxycodone for pain management. He comes to the office today for clinical evaluation.      Today he presents with exertional shortness of breath, unchanged from previous. He states it takes at least 5 minutes for him to climb 1 flight of stairs in his 2-story house  He does admits his left flank pain has completely resolved since passing his kidney stone over the weekend. He denies chest pain,  PND, orthopnea, presyncope, syncope, edema ( managed with compression stockings), heart racing, or palpitations. He does admit to sleeping in a chair over the past few weeks due to comfort.     Blood pressure is elevated today at 158/74, home pressures are consistent with today's readings.   BMP today showed a sodium of 138, potassium 4.1, BUN 24, Creatinine 1.37, and GFR 50  BMI 34.77, down ~ 3 lbs.     Activity consists of caring for his wife who is bedridden.   Remains compliant with all medications.                   Assessment and Plan:   1. Exertional shortness of breath - unchanged. Will start low dose Lasix 20mg daily. Creatinine normalizing  1.3 from previous 2.2, after acute episode of renal colic. Recent equivocal stress test. Reviewed with TED Deleon to proceed with planned coronary angiogram. Risks and benefits of coronary angiogram discussed today including, bleeding, bruising, infection, allergic reaction, kidney damage (including need for dialysis), stroke, heart attack, vascular damage, emergency open heart surgery, up to and including death.  Patient indicates understanding and is agreeable to proceed. Continue on current medical therapy. He is quite concerned if he may need to stay overnight following his procedure. He has been informed this may be a possibility and will make back up arrangements for his wife.     2. Hypertension - Elevated today, will add low dose lasix to current medication regimen. Monitor blood pressure daily with a goal of < 130/90. Low salt diet.     3. Diabetes - managed on metformin.  4. Renal colic - Resolved.                   Thank you for allowing me to participate in this delightful patient's care. Follow up post angiogram        RANJAN Garcia, CNP          Review of Systems:   Review of Systems:  Skin:  Negative     Eyes:  Positive for glasses  ENT:  Positive for hearing loss;sinus trouble;postnasal drainage  Respiratory:  Positive for dyspnea on exertion;sleep apnea;dyspnea at rest;wheezing  Cardiovascular:    Positive for;fatigue;palpitations;edema  Gastroenterology: Positive for    Genitourinary:  Negative    Musculoskeletal:  Positive for arthritis  Neurologic:  Positive for numbness or tingling of feet  Psychiatric:  Positive for sleep disturbances  Heme/Lymph/Imm:  Negative    Endocrine:  Positive for diabetes              Physical Exam:     GEN:  In general, this is an obese male, in no acute distress.  HEENT:  Pupils equal, round. Sclerae nonicteric. Clear oropharynx. Mucous membranes moist.  NECK: Supple, no masses appreciated. Trachea midline. No JVD   C/V:  Regular rate and rhythm,  no murmur, rub or gallop. No S3 or RV heave.   RESP: Respirations are unlabored. No use of accessory muscles. Clear to auscultation bilaterally without wheezing, rales, or rhonchi.  GI: Abdomen soft, nontender, nondistended. No HSM appreciated.   EXTREM: Trace bilateral LE edema. No cyanosis or clubbing.  NEURO: Alert and oriented, cooperative. Gait stable. No obvious focal deficits.   PSYCH: Normal affect.  SKIN: Warm and dry. No rashes or petechiae appreciated.          Past Medical History:     Past Medical History:   Diagnosis Date     Arthritis     osteoarthritis knees     Cerebral artery occlusion with cerebral infarction (H)     TIA 1993, no residual     Chronic rhinitis      Diabetes mellitus (H)      Dyspnea 02/17/2019     HTN (hypertension)      Hypertrophy of prostate with urinary obstruction and other lower urinary tract symptoms (LUTS)      Sleep apnea     doesn't use cpap     TIA (transient ischaemic attack) 1993     Unspecified transient cerebral ischemia 1993    No definite source found              Past Surgical History:     Past Surgical History:   Procedure Laterality Date     AMPUTATE TOE(S) Left 10/15/2018    Procedure: AMPUTATE TOE(S);  Left third toe amputation;  Surgeon: Ayaka Azevedo DPM, Podiatry/Foot and Ankle Surgery;  Location: [facility] OR     ARTHROPLASTY KNEE Right 12/7/2018    Procedure: Right total knee arthroplasty;  Surgeon: Issa Cunha MD;  Location: [facility] OR     C NONSPECIFIC PROCEDURE  1985    Diastasis recti repair     C NONSPECIFIC PROCEDURE  1987    Ventral hernia repair     C NONSPECIFIC PROCEDURE      ORIF of left shoulder     COLONOSCOPY  3/9/2013    Procedure: COLONOSCOPY;  COLONOSCOPY;  Surgeon: Chau Hogan MD;  Location:  GI     EYE SURGERY  03/13/2017    left eye cataract     FOOT SURGERY  4/2013    cyst removal      HERNIA REPAIR  7/13/2004    ventral               Allergies:   Penicillins; Sulfa drugs; and Ancef [cefazolin]       Data:   All laboratory data  reviewed:    LAST CHOLESTEROL:  Lab Results   Component Value Date    CHOL 151 10/07/2017     Lab Results   Component Value Date    HDL 65 10/07/2017     Lab Results   Component Value Date    LDL 72 10/07/2017     Lab Results   Component Value Date    TRIG 68 10/07/2017     Lab Results   Component Value Date    CHOLHDLRATIO 2.5 02/14/2015       LAST BMP:  Lab Results   Component Value Date     03/04/2019      Lab Results   Component Value Date    POTASSIUM 4.1 03/04/2019     Lab Results   Component Value Date    CHLORIDE 110 03/04/2019     Lab Results   Component Value Date    SHANNAN 8.6 03/04/2019     Lab Results   Component Value Date    CO2 22 03/04/2019     Lab Results   Component Value Date    BUN 24 03/04/2019     Lab Results   Component Value Date    CR 1.37 03/04/2019     Lab Results   Component Value Date     03/04/2019       LAST CBC:  Lab Results   Component Value Date    WBC 9.4 03/01/2019     Lab Results   Component Value Date    RBC 3.64 03/01/2019     Lab Results   Component Value Date    HGB 10.5 03/01/2019     Lab Results   Component Value Date    HCT 33.4 03/01/2019     Lab Results   Component Value Date    MCV 92 03/01/2019     Lab Results   Component Value Date    MCH 28.8 03/01/2019     Lab Results   Component Value Date    MCHC 31.4 03/01/2019     Lab Results   Component Value Date    RDW 16.2 03/01/2019     Lab Results   Component Value Date     03/01/2019

## 2019-03-04 NOTE — LETTER
3/4/2019    Anh Spivey NP  [address] [facility] [address]    RE: Lance Ibrahim       Dear Colleague,    I had the pleasure of seeing Lance Ibrahim in the [facility].    Cardiology Clinic Progress Note  Lance Ibrahim MRN# [id number]   YOB: 1945 Age: 73 year old     Reason For Visit:  Shortness of breath    Primary Cardiologist:   Dr. Carla Lai           History of Presenting Illness:    Lance Ibrahim is a pleasant 73 year old patient who carries a past medical history significant for diabetes Type 2, PVD hypertension, TIA, gout and obesity.     He was last seen on 2/22/19,  in follow up to an ER evaluation due to chest pain and shortness of breath. Cardiac workup consisted of a nuclear stress test that was equivocal and elevated troponin that peaked at 0.027. He was referred for a coronary angiogram, scheduled for 3/1/19. Unfortunately, this was cancelled due to an episode of acute left renal colic. CT confirmed a 0.3mm left ureteral stone, creatinine elevated to 2.21. He was consulted by urology who initiated him on Macrobid and oxycodone for pain management. He comes to the office today for clinical evaluation.      Today he presents with exertional shortness of breath, unchanged from previous. He states it takes at least 5 minutes for him to climb 1 flight of stairs in his 2-story house  He does admits his left flank pain has completely resolved since passing his kidney stone over the weekend. He denies chest pain,  PND, orthopnea, presyncope, syncope, edema ( managed with compression stockings), heart racing, or palpitations. He does admit to sleeping in a chair over the past few weeks due to comfort.     Blood pressure is elevated today at 158/74, home pressures are consistent with today's readings.   BMP today showed a sodium of 138, potassium 4.1, BUN 24, Creatinine 1.37, and GFR 50  BMI 34.77, down ~ 3 lbs.     Activity consists of caring for  his wife who is bedridden.   Remains compliant with all medications.                   Assessment and Plan:   1. Exertional shortness of breath - unchanged. Will start low dose Lasix 20mg daily. Creatinine normalizing 1.3 from previous 2.2, after acute episode of renal colic. Recent equivocal stress test. Reviewed with TED Deleon to proceed with planned coronary angiogram. Risks and benefits of coronary angiogram discussed today including, bleeding, bruising, infection, allergic reaction, kidney damage (including need for dialysis), stroke, heart attack, vascular damage, emergency open heart surgery, up to and including death.  Patient indicates understanding and is agreeable to proceed. Continue on current medical therapy. He is quite concerned if he may need to stay overnight following his procedure. He has been informed this may be a possibility and will make back up arrangements for his wife.     2. Hypertension - Elevated today, will add low dose lasix to current medication regimen. Monitor blood pressure daily with a goal of < 130/90. Low salt diet.     3. Diabetes - managed on metformin.  4. Renal colic - Resolved.                   Thank you for allowing me to participate in this delightful patient's care. Follow up post angiogram        RANJAN Garcia, CNP          Review of Systems:   Review of Systems:  Skin:  Negative     Eyes:  Positive for glasses  ENT:  Positive for hearing loss;sinus trouble;postnasal drainage  Respiratory:  Positive for dyspnea on exertion;sleep apnea;dyspnea at rest;wheezing  Cardiovascular:    Positive for;fatigue;palpitations;edema  Gastroenterology: Positive for    Genitourinary:  Negative    Musculoskeletal:  Positive for arthritis  Neurologic:  Positive for numbness or tingling of feet  Psychiatric:  Positive for sleep disturbances  Heme/Lymph/Imm:  Negative    Endocrine:  Positive for diabetes              Physical Exam:     GEN:  In general, this is an  obese male, in no acute distress.  HEENT:  Pupils equal, round. Sclerae nonicteric. Clear oropharynx. Mucous membranes moist.  NECK: Supple, no masses appreciated. Trachea midline. No JVD   C/V:  Regular rate and rhythm, no murmur, rub or gallop. No S3 or RV heave.   RESP: Respirations are unlabored. No use of accessory muscles. Clear to auscultation bilaterally without wheezing, rales, or rhonchi.  GI: Abdomen soft, nontender, nondistended. No HSM appreciated.   EXTREM: Trace bilateral LE edema. No cyanosis or clubbing.  NEURO: Alert and oriented, cooperative. Gait stable. No obvious focal deficits.   PSYCH: Normal affect.  SKIN: Warm and dry. No rashes or petechiae appreciated.          Past Medical History:     Past Medical History:   Diagnosis Date     Arthritis     osteoarthritis knees     Cerebral artery occlusion with cerebral infarction (H)     TIA 1993, no residual     Chronic rhinitis      Diabetes mellitus (H)      Dyspnea 02/17/2019     HTN (hypertension)      Hypertrophy of prostate with urinary obstruction and other lower urinary tract symptoms (LUTS)      Sleep apnea     doesn't use cpap     TIA (transient ischaemic attack) 1993     Unspecified transient cerebral ischemia 1993    No definite source found              Past Surgical History:     Past Surgical History:   Procedure Laterality Date     AMPUTATE TOE(S) Left 10/15/2018    Procedure: AMPUTATE TOE(S);  Left third toe amputation;  Surgeon: Ayaka Azevedo DPM, Podiatry/Foot and Ankle Surgery;  Location: [facility]     ARTHROPLASTY KNEE Right 12/7/2018    Procedure: Right total knee arthroplasty;  Surgeon: Issa Cunha MD;  Location: [facility] OR     C NONSPECIFIC PROCEDURE  1985    Diastasis recti repair     C NONSPECIFIC PROCEDURE  1987    Ventral hernia repair     C NONSPECIFIC PROCEDURE      ORIF of left shoulder     COLONOSCOPY  3/9/2013    Procedure: COLONOSCOPY;  COLONOSCOPY;  Surgeon: Chau Hogan MD;  Location:  GI     EYE SURGERY   03/13/2017    left eye cataract     FOOT SURGERY  4/2013    cyst removal      HERNIA REPAIR  7/13/2004    ventral               Allergies:   Penicillins; Sulfa drugs; and Ancef [cefazolin]       Data:   All laboratory data reviewed:    LAST CHOLESTEROL:  Lab Results   Component Value Date    CHOL 151 10/07/2017     Lab Results   Component Value Date    HDL 65 10/07/2017     Lab Results   Component Value Date    LDL 72 10/07/2017     Lab Results   Component Value Date    TRIG 68 10/07/2017     Lab Results   Component Value Date    CHOLHDLRATIO 2.5 02/14/2015       LAST BMP:  Lab Results   Component Value Date     03/04/2019      Lab Results   Component Value Date    POTASSIUM 4.1 03/04/2019     Lab Results   Component Value Date    CHLORIDE 110 03/04/2019     Lab Results   Component Value Date    SHANNAN 8.6 03/04/2019     Lab Results   Component Value Date    CO2 22 03/04/2019     Lab Results   Component Value Date    BUN 24 03/04/2019     Lab Results   Component Value Date    CR 1.37 03/04/2019     Lab Results   Component Value Date     03/04/2019       LAST CBC:  Lab Results   Component Value Date    WBC 9.4 03/01/2019     Lab Results   Component Value Date    RBC 3.64 03/01/2019     Lab Results   Component Value Date    HGB 10.5 03/01/2019     Lab Results   Component Value Date    HCT 33.4 03/01/2019     Lab Results   Component Value Date    MCV 92 03/01/2019     Lab Results   Component Value Date    MCH 28.8 03/01/2019     Lab Results   Component Value Date    MCHC 31.4 03/01/2019     Lab Results   Component Value Date    RDW 16.2 03/01/2019     Lab Results   Component Value Date     03/01/2019             Thank you for allowing me to participate in the care of your patient.      Sincerely,     RANJAN Garcia [facility]    cc:   Lars Martines MD  [address]  [place] [id number]

## 2019-03-04 NOTE — LETTER
3/4/2019    Anh Spivey NP  [address] [facility] [address]    RE: Lance Ibrahim       Dear Colleague,    I had the pleasure of seeing Lance Ibrahim in the [facility].    Cardiology Clinic Progress Note  Lance Ibrahim MRN# [id number]   YOB: 1945 Age: 73 year old     Reason For Visit:  Shortness of breath    Primary Cardiologist:   Dr. Carla Lai           History of Presenting Illness:    Lance Ibrahim is a pleasant 73 year old patient who carries a past medical history significant for diabetes Type 2, PVD hypertension, TIA, gout and obesity.     He was last seen on 2/22/19,  in follow up to an ER evaluation due to chest pain and shortness of breath. Cardiac workup consisted of a nuclear stress test that was equivocal and elevated troponin that peaked at 0.027. He was referred for a coronary angiogram, scheduled for 3/1/19. Unfortunately, this was cancelled due to an episode of acute left renal colic. CT confirmed a 0.3mm left ureteral stone, creatinine elevated to 2.21. He was consulted by urology who initiated him on Macrobid and oxycodone for pain management. He comes to the office today for clinical evaluation.      Today he presents with exertional shortness of breath, unchanged from previous. He states it takes at least 5 minutes for him to climb 1 flight of stairs in his 2-story house  He does admits his left flank pain has completely resolved since passing his kidney stone over the weekend. He denies chest pain,  PND, orthopnea, presyncope, syncope, edema ( managed with compression stockings), heart racing, or palpitations. He does admit to sleeping in a chair over the past few weeks due to comfort.     Blood pressure is elevated today at 158/74, home pressures are consistent with today's readings.   BMP today showed a sodium of 138, potassium 4.1, BUN 24, Creatinine 1.37, and GFR 50  BMI 34.77, down ~ 3 lbs.     Activity consists of caring for  his wife who is bedridden.   Remains compliant with all medications.                   Assessment and Plan:   1. Exertional shortness of breath - unchanged. Will start low dose Lasix 20mg daily. Creatinine normalizing 1.3 from previous 2.2, after acute episode of renal colic. Recent equivocal stress test. Reviewed with TED Deleon to proceed with planned coronary angiogram. Risks and benefits of coronary angiogram discussed today including, bleeding, bruising, infection, allergic reaction, kidney damage (including need for dialysis), stroke, heart attack, vascular damage, emergency open heart surgery, up to and including death.  Patient indicates understanding and is agreeable to proceed. Continue on current medical therapy. He is quite concerned if he may need to stay overnight following his procedure. He has been informed this may be a possibility and will make back up arrangements for his wife.     2. Hypertension - Elevated today, will add low dose lasix to current medication regimen. Monitor blood pressure daily with a goal of < 130/90. Low salt diet.     3. Diabetes - managed on metformin.  4. Renal colic - Resolved.                   Thank you for allowing me to participate in this delightful patient's care. Follow up post angiogram        RANJAN Garcia, CNP          Review of Systems:   Review of Systems:  Skin:  Negative     Eyes:  Positive for glasses  ENT:  Positive for hearing loss;sinus trouble;postnasal drainage  Respiratory:  Positive for dyspnea on exertion;sleep apnea;dyspnea at rest;wheezing  Cardiovascular:    Positive for;fatigue;palpitations;edema  Gastroenterology: Positive for    Genitourinary:  Negative    Musculoskeletal:  Positive for arthritis  Neurologic:  Positive for numbness or tingling of feet  Psychiatric:  Positive for sleep disturbances  Heme/Lymph/Imm:  Negative    Endocrine:  Positive for diabetes              Physical Exam:     GEN:  In general, this is an  obese male, in no acute distress.  HEENT:  Pupils equal, round. Sclerae nonicteric. Clear oropharynx. Mucous membranes moist.  NECK: Supple, no masses appreciated. Trachea midline. No JVD   C/V:  Regular rate and rhythm, no murmur, rub or gallop. No S3 or RV heave.   RESP: Respirations are unlabored. No use of accessory muscles. Clear to auscultation bilaterally without wheezing, rales, or rhonchi.  GI: Abdomen soft, nontender, nondistended. No HSM appreciated.   EXTREM: Trace bilateral LE edema. No cyanosis or clubbing.  NEURO: Alert and oriented, cooperative. Gait stable. No obvious focal deficits.   PSYCH: Normal affect.  SKIN: Warm and dry. No rashes or petechiae appreciated.          Past Medical History:     Past Medical History:   Diagnosis Date     Arthritis     osteoarthritis knees     Cerebral artery occlusion with cerebral infarction (H)     TIA 1993, no residual     Chronic rhinitis      Diabetes mellitus (H)      Dyspnea 02/17/2019     HTN (hypertension)      Hypertrophy of prostate with urinary obstruction and other lower urinary tract symptoms (LUTS)      Sleep apnea     doesn't use cpap     TIA (transient ischaemic attack) 1993     Unspecified transient cerebral ischemia 1993    No definite source found              Past Surgical History:     Past Surgical History:   Procedure Laterality Date     AMPUTATE TOE(S) Left 10/15/2018    Procedure: AMPUTATE TOE(S);  Left third toe amputation;  Surgeon: Ayaka Azevedo DPM, Podiatry/Foot and Ankle Surgery;  Location: [facility] OR     ARTHROPLASTY KNEE Right 12/7/2018    Procedure: Right total knee arthroplasty;  Surgeon: Issa Cunha MD;  Location: [facility] OR     C NONSPECIFIC PROCEDURE  1985    Diastasis recti repair     C NONSPECIFIC PROCEDURE  1987    Ventral hernia repair     C NONSPECIFIC PROCEDURE      ORIF of left shoulder     COLONOSCOPY  3/9/2013    Procedure: COLONOSCOPY;  COLONOSCOPY;  Surgeon: Chau Hogan MD;  Location:  GI     EYE SURGERY   03/13/2017    left eye cataract     FOOT SURGERY  4/2013    cyst removal      HERNIA REPAIR  7/13/2004    ventral               Allergies:   Penicillins; Sulfa drugs; and Ancef [cefazolin]       Data:   All laboratory data reviewed:    LAST CHOLESTEROL:  Lab Results   Component Value Date    CHOL 151 10/07/2017     Lab Results   Component Value Date    HDL 65 10/07/2017     Lab Results   Component Value Date    LDL 72 10/07/2017     Lab Results   Component Value Date    TRIG 68 10/07/2017     Lab Results   Component Value Date    CHOLHDLRATIO 2.5 02/14/2015       LAST BMP:  Lab Results   Component Value Date     03/04/2019      Lab Results   Component Value Date    POTASSIUM 4.1 03/04/2019     Lab Results   Component Value Date    CHLORIDE 110 03/04/2019     Lab Results   Component Value Date    SHANNAN 8.6 03/04/2019     Lab Results   Component Value Date    CO2 22 03/04/2019     Lab Results   Component Value Date    BUN 24 03/04/2019     Lab Results   Component Value Date    CR 1.37 03/04/2019     Lab Results   Component Value Date     03/04/2019       LAST CBC:  Lab Results   Component Value Date    WBC 9.4 03/01/2019     Lab Results   Component Value Date    RBC 3.64 03/01/2019     Lab Results   Component Value Date    HGB 10.5 03/01/2019     Lab Results   Component Value Date    HCT 33.4 03/01/2019     Lab Results   Component Value Date    MCV 92 03/01/2019     Lab Results   Component Value Date    MCH 28.8 03/01/2019     Lab Results   Component Value Date    MCHC 31.4 03/01/2019     Lab Results   Component Value Date    RDW 16.2 03/01/2019     Lab Results   Component Value Date     03/01/2019             Thank you for allowing me to participate in the care of your patient.    Sincerely,     RANJAN Garcia [facility]

## 2019-03-05 ENCOUNTER — HOSPITAL ENCOUNTER (OUTPATIENT)
Facility: CLINIC | Age: 74
Discharge: HOME | End: 2019-03-05
Payer: COMMERCIAL

## 2019-03-05 ENCOUNTER — SURGERY (OUTPATIENT)
Age: 74
End: 2019-03-05
Payer: COMMERCIAL

## 2019-03-05 VITALS
SYSTOLIC BLOOD PRESSURE: 168 MMHG | RESPIRATION RATE: 20 BRPM | OXYGEN SATURATION: 95 % | HEART RATE: 87 BPM | TEMPERATURE: 95.6 F | DIASTOLIC BLOOD PRESSURE: 88 MMHG

## 2019-03-05 DIAGNOSIS — R94.39 ABNORMAL STRESS TEST: ICD-10-CM

## 2019-03-05 DIAGNOSIS — I10 ESSENTIAL HYPERTENSION: ICD-10-CM

## 2019-03-05 DIAGNOSIS — Z98.890 STATUS POST CORONARY ANGIOGRAM: Primary | ICD-10-CM

## 2019-03-05 DIAGNOSIS — I42.9 CARDIOMYOPATHY, UNSPECIFIED TYPE (H): ICD-10-CM

## 2019-03-05 DIAGNOSIS — R06.02 SHORTNESS OF BREATH: ICD-10-CM

## 2019-03-05 PROCEDURE — 99152 MOD SED SAME PHYS/QHP 5/>YRS: CPT | Performed by: INTERNAL MEDICINE

## 2019-03-05 PROCEDURE — 93571 IV DOP VEL&/PRESS C FLO 1ST: CPT | Mod: 26 | Performed by: INTERNAL MEDICINE

## 2019-03-05 PROCEDURE — 25000128 H RX IP 250 OP 636: Performed by: INTERNAL MEDICINE

## 2019-03-05 PROCEDURE — 93010 ELECTROCARDIOGRAM REPORT: CPT | Performed by: INTERNAL MEDICINE

## 2019-03-05 PROCEDURE — 27210794 ZZH OR GENERAL SUPPLY STERILE: Performed by: INTERNAL MEDICINE

## 2019-03-05 PROCEDURE — 93571 IV DOP VEL&/PRESS C FLO 1ST: CPT | Performed by: INTERNAL MEDICINE

## 2019-03-05 PROCEDURE — 40000065 ZZH STATISTIC EKG NON-CHARGEABLE

## 2019-03-05 PROCEDURE — 25000125 ZZHC RX 250: Performed by: INTERNAL MEDICINE

## 2019-03-05 PROCEDURE — 25800030 ZZH RX IP 258 OP 636: Performed by: INTERNAL MEDICINE

## 2019-03-05 PROCEDURE — 93005 ELECTROCARDIOGRAM TRACING: CPT

## 2019-03-05 PROCEDURE — 93458 L HRT ARTERY/VENTRICLE ANGIO: CPT | Mod: 26 | Performed by: INTERNAL MEDICINE

## 2019-03-05 PROCEDURE — C1769 GUIDE WIRE: HCPCS | Performed by: INTERNAL MEDICINE

## 2019-03-05 PROCEDURE — 93572 IV DOP VEL&/PRESS C FLO EA: CPT | Performed by: INTERNAL MEDICINE

## 2019-03-05 PROCEDURE — 99214 OFFICE O/P EST MOD 30 MIN: CPT | Performed by: INTERNAL MEDICINE

## 2019-03-05 PROCEDURE — 25000132 ZZH RX MED GY IP 250 OP 250 PS 637: Performed by: INTERNAL MEDICINE

## 2019-03-05 PROCEDURE — 93572 IV DOP VEL&/PRESS C FLO EA: CPT | Mod: 26 | Performed by: INTERNAL MEDICINE

## 2019-03-05 PROCEDURE — 40000275 ZZH STATISTIC RCP TIME EA 10 MIN

## 2019-03-05 PROCEDURE — 93458 L HRT ARTERY/VENTRICLE ANGIO: CPT | Performed by: INTERNAL MEDICINE

## 2019-03-05 PROCEDURE — 99153 MOD SED SAME PHYS/QHP EA: CPT

## 2019-03-05 RX ORDER — SODIUM CHLORIDE 9 MG/ML
INJECTION, SOLUTION INTRAVENOUS CONTINUOUS
Status: CANCELLED | OUTPATIENT
Start: 2019-03-05

## 2019-03-05 RX ORDER — LABETALOL 200 MG/1
200 TABLET, FILM COATED ORAL 2 TIMES DAILY
Qty: 60 TABLET | Refills: 3 | Status: SHIPPED | OUTPATIENT
Start: 2019-03-05 | End: 2019-03-25

## 2019-03-05 RX ORDER — POTASSIUM CHLORIDE 1500 MG/1
20 TABLET, EXTENDED RELEASE ORAL
Status: DISCONTINUED | OUTPATIENT
Start: 2019-03-05 | End: 2019-03-06 | Stop reason: HOSPADM

## 2019-03-05 RX ORDER — HYDROCODONE BITARTRATE AND ACETAMINOPHEN 5; 325 MG/1; MG/1
1-2 TABLET ORAL EVERY 4 HOURS PRN
Status: CANCELLED | OUTPATIENT
Start: 2019-03-05

## 2019-03-05 RX ORDER — METOPROLOL TARTRATE 1 MG/ML
INJECTION, SOLUTION INTRAVENOUS
Status: DISCONTINUED | OUTPATIENT
Start: 2019-03-05 | End: 2019-03-06 | Stop reason: HOSPADM

## 2019-03-05 RX ORDER — ATROPINE SULFATE 0.1 MG/ML
0.5 INJECTION INTRAVENOUS EVERY 5 MIN PRN
Status: CANCELLED | OUTPATIENT
Start: 2019-03-05 | End: 2019-03-06

## 2019-03-05 RX ORDER — TORSEMIDE 20 MG/1
20 TABLET ORAL DAILY
Qty: 30 TABLET | Refills: 3 | Status: SHIPPED | OUTPATIENT
Start: 2019-03-05 | End: 2019-03-25

## 2019-03-05 RX ORDER — LIDOCAINE 40 MG/G
CREAM TOPICAL
Status: DISCONTINUED | OUTPATIENT
Start: 2019-03-05 | End: 2019-03-06 | Stop reason: HOSPADM

## 2019-03-05 RX ORDER — FENTANYL CITRATE 50 UG/ML
25-50 INJECTION, SOLUTION INTRAMUSCULAR; INTRAVENOUS
Status: CANCELLED | OUTPATIENT
Start: 2019-03-05 | End: 2019-03-06

## 2019-03-05 RX ORDER — ACETAMINOPHEN 325 MG/1
325-650 TABLET ORAL EVERY 4 HOURS PRN
Status: CANCELLED | OUTPATIENT
Start: 2019-03-05

## 2019-03-05 RX ORDER — SODIUM CHLORIDE 9 MG/ML
INJECTION, SOLUTION INTRAVENOUS CONTINUOUS
Status: DISCONTINUED | OUTPATIENT
Start: 2019-03-05 | End: 2019-03-06 | Stop reason: HOSPADM

## 2019-03-05 RX ORDER — ATORVASTATIN CALCIUM 10 MG/1
10 TABLET, FILM COATED ORAL DAILY
Qty: 30 TABLET | Refills: 3 | Status: SHIPPED | OUTPATIENT
Start: 2019-03-05 | End: 2019-03-25

## 2019-03-05 RX ORDER — LIDOCAINE 40 MG/G
CREAM TOPICAL
Status: CANCELLED | OUTPATIENT
Start: 2019-03-05

## 2019-03-05 RX ORDER — METOPROLOL TARTRATE 1 MG/ML
INJECTION, SOLUTION INTRAVENOUS
Status: DISCONTINUED
Start: 2019-03-05 | End: 2019-03-06 | Stop reason: HOSPADM

## 2019-03-05 RX ORDER — IOPAMIDOL 755 MG/ML
INJECTION, SOLUTION INTRAVASCULAR
Status: DISCONTINUED | OUTPATIENT
Start: 2019-03-05 | End: 2019-03-06 | Stop reason: HOSPADM

## 2019-03-05 RX ORDER — FLUMAZENIL 0.1 MG/ML
0.2 INJECTION, SOLUTION INTRAVENOUS
Status: CANCELLED | OUTPATIENT
Start: 2019-03-05 | End: 2019-03-06

## 2019-03-05 RX ORDER — HYDRALAZINE HYDROCHLORIDE 20 MG/ML
INJECTION INTRAMUSCULAR; INTRAVENOUS
Status: DISCONTINUED
Start: 2019-03-05 | End: 2019-03-06 | Stop reason: HOSPADM

## 2019-03-05 RX ORDER — LIDOCAINE HYDROCHLORIDE 10 MG/ML
INJECTION, SOLUTION EPIDURAL; INFILTRATION; INTRACAUDAL; PERINEURAL
Status: DISCONTINUED
Start: 2019-03-05 | End: 2019-03-05 | Stop reason: HOSPADM

## 2019-03-05 RX ORDER — ENALAPRILAT 1.25 MG/ML
INJECTION INTRAVENOUS
Status: DISCONTINUED
Start: 2019-03-05 | End: 2019-03-06 | Stop reason: HOSPADM

## 2019-03-05 RX ORDER — FUROSEMIDE 10 MG/ML
INJECTION INTRAMUSCULAR; INTRAVENOUS
Status: DISCONTINUED
Start: 2019-03-05 | End: 2019-03-06 | Stop reason: HOSPADM

## 2019-03-05 RX ORDER — FENTANYL CITRATE 50 UG/ML
INJECTION, SOLUTION INTRAMUSCULAR; INTRAVENOUS
Status: DISCONTINUED
Start: 2019-03-05 | End: 2019-03-05 | Stop reason: HOSPADM

## 2019-03-05 RX ORDER — AMLODIPINE BESYLATE 5 MG/1
5 TABLET ORAL DAILY
Qty: 30 TABLET | Refills: 3 | Status: SHIPPED | OUTPATIENT
Start: 2019-03-05 | End: 2019-03-25

## 2019-03-05 RX ORDER — HYDRALAZINE HYDROCHLORIDE 20 MG/ML
INJECTION INTRAMUSCULAR; INTRAVENOUS
Status: DISCONTINUED | OUTPATIENT
Start: 2019-03-05 | End: 2019-03-06 | Stop reason: HOSPADM

## 2019-03-05 RX ORDER — NIFEDIPINE 10 MG/1
CAPSULE ORAL
Status: DISCONTINUED | OUTPATIENT
Start: 2019-03-05 | End: 2019-03-06 | Stop reason: HOSPADM

## 2019-03-05 RX ORDER — FUROSEMIDE 10 MG/ML
INJECTION INTRAMUSCULAR; INTRAVENOUS
Status: DISCONTINUED | OUTPATIENT
Start: 2019-03-05 | End: 2019-03-06 | Stop reason: HOSPADM

## 2019-03-05 RX ORDER — FENTANYL CITRATE 50 UG/ML
INJECTION, SOLUTION INTRAMUSCULAR; INTRAVENOUS
Status: DISCONTINUED | OUTPATIENT
Start: 2019-03-05 | End: 2019-03-06 | Stop reason: HOSPADM

## 2019-03-05 RX ORDER — HEPARIN SODIUM 1000 [USP'U]/ML
INJECTION, SOLUTION INTRAVENOUS; SUBCUTANEOUS
Status: DISCONTINUED
Start: 2019-03-05 | End: 2019-03-05 | Stop reason: HOSPADM

## 2019-03-05 RX ORDER — ENALAPRILAT 1.25 MG/ML
1.25 INJECTION INTRAVENOUS EVERY 6 HOURS
Status: DISCONTINUED | OUTPATIENT
Start: 2019-03-05 | End: 2019-03-06 | Stop reason: HOSPADM

## 2019-03-05 RX ORDER — NALOXONE HYDROCHLORIDE 0.4 MG/ML
.2-.4 INJECTION, SOLUTION INTRAMUSCULAR; INTRAVENOUS; SUBCUTANEOUS
Status: CANCELLED | OUTPATIENT
Start: 2019-03-05 | End: 2019-03-06

## 2019-03-05 RX ORDER — NIFEDIPINE 10 MG/1
CAPSULE ORAL
Status: DISCONTINUED
Start: 2019-03-05 | End: 2019-03-06 | Stop reason: HOSPADM

## 2019-03-05 RX ORDER — NALOXONE HYDROCHLORIDE 0.4 MG/ML
.1-.4 INJECTION, SOLUTION INTRAMUSCULAR; INTRAVENOUS; SUBCUTANEOUS
Status: CANCELLED | OUTPATIENT
Start: 2019-03-05

## 2019-03-05 RX ADMIN — FENTANYL CITRATE 50 MCG: 50 INJECTION, SOLUTION INTRAMUSCULAR; INTRAVENOUS at 13:41

## 2019-03-05 RX ADMIN — LIDOCAINE HYDROCHLORIDE 9 ML: 10 INJECTION, SOLUTION INFILTRATION; PERINEURAL at 13:42

## 2019-03-05 RX ADMIN — MIDAZOLAM 1 MG: 1 INJECTION INTRAMUSCULAR; INTRAVENOUS at 13:43

## 2019-03-05 RX ADMIN — FUROSEMIDE 20 MG: 10 INJECTION, SOLUTION INTRAVENOUS at 14:29

## 2019-03-05 RX ADMIN — ENALAPRILAT 1.25 MG: 1.25 INJECTION INTRAVENOUS at 14:45

## 2019-03-05 RX ADMIN — METOPROLOL TARTRATE 5 MG: 1 INJECTION, SOLUTION INTRAVENOUS at 14:19

## 2019-03-05 RX ADMIN — METOPROLOL TARTRATE 5 MG: 1 INJECTION, SOLUTION INTRAVENOUS at 14:26

## 2019-03-05 RX ADMIN — FENTANYL CITRATE 50 MCG: 50 INJECTION, SOLUTION INTRAMUSCULAR; INTRAVENOUS at 14:20

## 2019-03-05 RX ADMIN — IOPAMIDOL 140 ML: 755 INJECTION, SOLUTION INTRAVENOUS at 14:24

## 2019-03-05 RX ADMIN — SODIUM CHLORIDE: 9 INJECTION, SOLUTION INTRAVENOUS at 10:26

## 2019-03-05 RX ADMIN — HYDRALAZINE HYDROCHLORIDE 10 MG: 20 INJECTION INTRAMUSCULAR; INTRAVENOUS at 14:10

## 2019-03-05 RX ADMIN — HYDRALAZINE HYDROCHLORIDE 10 MG: 20 INJECTION INTRAMUSCULAR; INTRAVENOUS at 14:18

## 2019-03-05 RX ADMIN — NIFEDIPINE 10 MG: 10 CAPSULE ORAL at 14:35

## 2019-03-05 RX ADMIN — MIDAZOLAM 1 MG: 1 INJECTION INTRAMUSCULAR; INTRAVENOUS at 13:41

## 2019-03-05 NOTE — IP AVS SNAPSHOT
MRN:[id number]                      After Visit Summary   3/5/2019    Lance Ibrahim    MRN: [id number]           Visit Information        Department      3/5/2019  9:26 AM [facility] Cardiac Cath Lab          Review of your medicines      UNREVIEWED medicines. Ask your doctor about these medicines       Dose / Directions   Ferrous Gluconate 240 (27 Fe) MG Tabs      Dose:  1 tablet  Take 1 tablet by mouth daily  Refills:  0     finasteride 5 MG tablet  Commonly known as:  PROSCAR      Dose:  5 mg  Take 5 mg by mouth daily.  Refills:  0     fluticasone 50 MCG/ACT nasal spray  Commonly known as:  FLONASE      Dose:  1 spray  Spray 1 spray into both nostrils At Bedtime  Refills:  0     isosorbide mononitrate 30 MG 24 hr tablet  Commonly known as:  IMDUR  Used for:  Dyspnea on exertion, Chest pain, unspecified type, Cardiomyopathy, unspecified type (H)      Dose:  30 mg  Take 1 tablet (30 mg) by mouth daily  Quantity:  30 tablet  Refills:  11     lisinopril 10 MG tablet  Commonly known as:  PRINIVIL/ZESTRIL      Dose:  30 mg  Take 3 tablets (30 mg) by mouth daily  Quantity:  90 tablet  Refills:  0     loratadine 10 MG tablet  Commonly known as:  CLARITIN      Dose:  10 mg  Take 10 mg by mouth At Bedtime  Refills:  0     metFORMIN 1000 MG tablet  Commonly known as:  GLUCOPHAGE  Used for:  Type 2 diabetes mellitus without complication, without long-term current use of insulin (H)      TAKE 1 TABLET (1,000 MG) BY MOUTH 2 TIMES DAILY (WITH MEALS) **DUE FOR APRIL APPT/LABS. CALL MD**  Quantity:  180 tablet  Refills:  1     MULTIVITAMIN ADULTS PO      Dose:  1 tablet  Take 1 tablet by mouth daily  Refills:  0     nitroFURantoin macrocrystal-monohydrate 100 MG capsule  Commonly known as:  MACROBID      Dose:  100 mg  Take 1 capsule (100 mg) by mouth 2 times daily for 5 days  Quantity:  10 capsule  Refills:  0     oxyCODONE 5 MG tablet  Commonly known as:  ROXICODONE      Dose:  5 mg  Take 1 tablet (5 mg)  by mouth every 6 hours as needed for pain  Quantity:  8 tablet  Refills:  0     tamsulosin 0.4 MG capsule  Commonly known as:  FLOMAX      Dose:  1 capsule  Take 1 capsule by mouth daily.  Refills:  0        START taking       Dose / Directions   amLODIPine 5 MG tablet  Commonly known as:  NORVASC  Used for:  Essential hypertension      Dose:  5 mg  Take 1 tablet (5 mg) by mouth daily  Quantity:  30 tablet  Refills:  3     atorvastatin 10 MG tablet  Commonly known as:  LIPITOR  Used for:  Essential hypertension      Dose:  10 mg  Take 1 tablet (10 mg) by mouth daily  Quantity:  30 tablet  Refills:  3     labetalol 200 MG tablet  Commonly known as:  NORMODYNE  Used for:  Essential hypertension      Dose:  200 mg  Take 1 tablet (200 mg) by mouth 2 times daily  Quantity:  60 tablet  Refills:  3     torsemide 20 MG tablet  Commonly known as:  DEMADEX  Used for:  Essential hypertension      Dose:  20 mg  Take 1 tablet (20 mg) by mouth daily  Quantity:  30 tablet  Refills:  3        CONTINUE these medicines which may have CHANGED, or have new prescriptions. If we are uncertain of the size of tablets/capsules you have at home, strength may be listed as something that might have changed.       Dose / Directions   aspirin 81 MG EC tablet  Commonly known as:  ASA  This may have changed:    medication strength  how much to take  Used for:  Essential hypertension      Dose:  81 mg  Take 1 tablet (81 mg) by mouth daily  Quantity:  100 tablet  Refills:  3        CONTINUE these medicines which have NOT CHANGED       Dose / Directions   blood glucose test strip  Commonly known as:  ACCU-CHEK SMARTVIEW  Used for:  Type 2 diabetes mellitus without complication, without long-term current use of insulin (H)      Use to test blood sugar 1 times daily or as directed.  Quantity:  100 strip  Refills:  3        STOP taking    furosemide 20 MG tablet  Commonly known as:  LASIX        ibuprofen 200 MG tablet  Commonly known as:   ADVIL/MOTRIN        metoprolol succinate ER 25 MG 24 hr tablet  Commonly known as:  TOPROL-XL              Where to get your medicines      Some of these will need a paper prescription and others can be bought over the counter. Ask your nurse if you have questions.    Bring a paper prescription for each of these medications  amLODIPine 5 MG tablet  aspirin 81 MG EC tablet  atorvastatin 10 MG tablet  labetalol 200 MG tablet  torsemide 20 MG tablet           Prescriptions were sent or printed at these locations (5 Prescriptions)            Other Prescriptions                Printed at Department/Unit printer (5 of 5)         amLODIPine (NORVASC) 5 MG tablet               aspirin (ASA) 81 MG EC tablet               atorvastatin (LIPITOR) 10 MG tablet               labetalol (NORMODYNE) 200 MG tablet               torsemide (DEMADEX) 20 MG tablet                Protect others around you: Learn how to safely use, store and throw away your medicines at www.disposemymeds.org.       Follow-ups after your visit       Additional Services     Follow-Up with Cardiologist      Pt has appt for march 12  Move this appt up to next 3-5 days if possible with dr beatty or any np and get bmp at time of visit           Your next 10 appointments already scheduled    Mar 12, 2019  3:10 PM CDT  Return Visit with Maria Fernanda Patiño PA-C  [facility] ([facility]) [address] [address] [address] [phone]      Care Instructions       After Care Instructions     Discharge Instructions - IF on Metformin (Glucophage or Glucovance) or Metformin containing medications      IF on Metformin (Glucophage or Glucovance) or Metformin containing medications , schedule a Basic Metabolic Panel at [facility] Primary Clinic in 48 - 72 hours post procedure and PRIOR TO resuming the Metformin or Metformin containing medications.  Hold Metformin (Glucophage or Glucovance) or  Metformin containing medications until after the Basic Metabolic Panel on the 2nd or 3rd day following the procedure.  May resume after blood draw is complete.           Further instructions from your care team                  Interventional Cardiology   Discharge Instructions         Post Angiogram (Cardiac)      AFTER YOU GO HOME:    DO NOT drive or operate machinery at home or at work for at least 24 hours    If you were given sedation: we recommend that an adult stay with you for 24 hours    DO relax and take it easy for 24 hours    DO drink plenty of fluids    DO resume your regular diet, unless otherwise instructed by your Primary Physician    DO NOT SMOKE FOR AT LEAST 24 HOURS. If you have been given any mediations that were to help you relax or sedate you during your procedure    DO NOT drink alcoholic beverages the day of your procedure    DO NOT do any strenuous exercise or lifting (>10 lbs) for at least 3-5 days following your procedure    DO NOT take a tub bath, get in a hot tub, or pool for at least 2 days following your procedure    Do Not scrub the procedure site vigorously    DO NOT make any important or legal decisions for 24 hours following your procedure    CALL YOUR PRIMARY PHYSICIAN IF:    You start bleeding from the procedure site. If you do start to bleed from that site, lie down flat and hold pressure on the site. Your physician will tell you if you need to return to the hospital. It is common to have a small bruise or lump at the site    You have numbness, coolness or change in color of the arm or leg of the puncture site    You experience pain or redness at the puncture site    You develop hives or a rash or unexplained itching    You develop a temperature of 101 degrees F or greater    ADDITIONAL INSTRUCTIONS     Support the puncture site for coughing, sneezing, or moving your bowels for the first 48 hours    No lotion or powder to the puncture site for 3 days    Right at Home Deb Tuesday  6-8PM, Anne 9-11AM and Char 4:30-6:30PM Wednesday, Deb 9-11AM and Char 4:30-6:30PM Thursday        Additional Information About Your Visit       [employer] Information    [employer] gives you secure access to your electronic health record. If you see a primary care provider, you can also send messages to your care team and make appointments. If you have questions, please call your primary care clinic.  If you do not have a primary care provider, please call [phone] and they will assist you.       Care EveryWhere ID    This is your Care EveryWhere ID. This could be used by other organizations to access your [facility] medical records  [id number]       Your Vitals Were     Blood Pressure   165/92   (BP Location: Left arm)    Pulse   87    Temperature   95.6  F (35.3  C) (Oral)    Respirations   16    Pulse Oximetry   94%          Primary Care Provider Office Phone # Fax #    Anh Edmond Spivey [phone]      Equal Access to Services    [facility]ASSAN [facility] Center[facility]RISTI : Hadii aad ku hadasho Soomaali, waaxda luqadaha, qaybta kaalmada adeegyada, waxay idiin hayaan adeeg kharash la'mattyn . So [facility] [phone].    ATENCIÓN: Si habla español, tiene a kaiser disposición servicios gratuitos de asistencia lingüística. Llame al [phone].    We comply with applicable federal civil rights laws and [place] laws. We do not discriminate on the basis of race, color, national origin, age, disability, sex, sexual orientation, or gender identity.           Thank you!    Thank you for choosing [facility] for your care. Our goal is always to provide you with excellent care. Hearing back from our patients is one way we can continue to improve our services. Please take a few minutes to complete the written survey that you may receive in the mail after you visit. If you would like to speak to someone directly about your visit please contact Patient Relations at [phone]. Thank you!              Medication List       Medications          Morning Afternoon Evening Bedtime As Needed    amLODIPine 5 MG tablet  Also known as:  NORVASC  INSTRUCTIONS:  Take 1 tablet (5 mg) by mouth daily                     aspirin 81 MG EC tablet  Also known as:  ASA  INSTRUCTIONS:  Take 1 tablet (81 mg) by mouth daily  What changed:    medication strength  how much to take                     atorvastatin 10 MG tablet  Also known as:  LIPITOR  INSTRUCTIONS:  Take 1 tablet (10 mg) by mouth daily                     blood glucose test strip  Also known as:  ACCU-CHEK SMARTVIEW  INSTRUCTIONS:  Use to test blood sugar 1 times daily or as directed.                     labetalol 200 MG tablet  Also known as:  NORMODYNE  INSTRUCTIONS:  Take 1 tablet (200 mg) by mouth 2 times daily                     torsemide 20 MG tablet  Also known as:  DEMADEX  INSTRUCTIONS:  Take 1 tablet (20 mg) by mouth daily                       ASK your doctor about these medications          Morning Afternoon Evening Bedtime As Needed    Ferrous Gluconate 240 (27 Fe) MG Tabs  INSTRUCTIONS:  Take 1 tablet by mouth daily                     finasteride 5 MG tablet  Also known as:  PROSCAR  INSTRUCTIONS:  Take 5 mg by mouth daily.                     fluticasone 50 MCG/ACT nasal spray  Also known as:  FLONASE  INSTRUCTIONS:  Spray 1 spray into both nostrils At Bedtime                     isosorbide mononitrate 30 MG 24 hr tablet  Also known as:  IMDUR  INSTRUCTIONS:  Take 1 tablet (30 mg) by mouth daily                     lisinopril 10 MG tablet  Also known as:  PRINIVIL/ZESTRIL  INSTRUCTIONS:  Take 3 tablets (30 mg) by mouth daily                     loratadine 10 MG tablet  Also known as:  CLARITIN  INSTRUCTIONS:  Take 10 mg by mouth At Bedtime                     metFORMIN 1000 MG tablet  Also known as:  GLUCOPHAGE  INSTRUCTIONS:  TAKE 1 TABLET (1,000 MG) BY MOUTH 2 TIMES DAILY (WITH MEALS) **DUE FOR APRIL APPT/LABS. CALL MD**                     MULTIVITAMIN ADULTS  PO  INSTRUCTIONS:  Take 1 tablet by mouth daily                     nitroFURantoin macrocrystal-monohydrate 100 MG capsule  Also known as:  MACROBID  INSTRUCTIONS:  Take 1 capsule (100 mg) by mouth 2 times daily for 5 days                     oxyCODONE 5 MG tablet  Also known as:  ROXICODONE  INSTRUCTIONS:  Take 1 tablet (5 mg) by mouth every 6 hours as needed for pain                     tamsulosin 0.4 MG capsule  Also known as:  FLOMAX  INSTRUCTIONS:  Take 1 capsule by mouth daily.

## 2019-03-05 NOTE — PHARMACY-ADMISSION MEDICATION HISTORY
Admission medication history interview status for this patient is complete. See [facility] admission navigator for allergy information, prior to admission medications and immunization status.     Medication history interview source(s):Patient  Medication history resources (including written lists, pill bottles, clinic record):None  Primary pharmacy:[facility]    Changes made to PTA medication list:  Added: none  Deleted: senna  Changed: flonase and claritin to HS    Actions taken by pharmacist (provider contacted, etc):None     Additional medication history information:Just started furosemide yesterday, only dose was yesterday.    Medication reconciliation/reorder completed by provider prior to medication history? No    Do you take OTC medications (eg tylenol, ibuprofen, fish oil, eye/ear drops, etc)? Y(Y/N)    For patients on insulin therapy: N (Y/N)  Lantus/levemir/NPH/Mix 70/30 dose:   (Y/N) (see Med list for doses)   Sliding scale Novolog Y/N  If Yes, do you have a baseline novolog pre-meal dose:  units with meals  Patients eat three meals a day:   Y/N    How many episodes of hypoglycemia do you have per week: _______  How many missed doses do you have per week: ______  How many times do you check your blood glucose per day: _______  Do you have a Continuous glucose monitor (CGM)   Y/N (remind pt that not approved for hospital use)   Any Barriers to therapy - Be specific :  cost of medications, comfortable with giving injections (if applicable), comfortable and confident with current diabetes regimen: Y/N ______________      Prior to Admission medications    Medication Sig Last Dose Taking? Auth Provider   aspirin (ASA) 325 MG EC tablet Take 1 tablet (325 mg) by mouth daily 3/5/2019 at Unknown time Yes Issa Cunha MD   Ferrous Gluconate 240 (27 Fe) MG TABS Take 1 tablet by mouth daily  3/4/2019 at Unknown time Yes Reported, Patient   finasteride (PROSCAR) 5 MG tablet Take 5 mg by mouth daily. 3/4/2019 at  Unknown time Yes Reported, Patient   fluticasone (FLONASE) 50 MCG/ACT spray Spray 1 spray into both nostrils At Bedtime  3/4/2019 at Unknown time Yes Unknown, Entered By History   furosemide (LASIX) 20 MG tablet Take 1 tablet (20 mg) by mouth daily 3/4/2019 at Unknown time Yes Lakia Damon APRN CNP   ibuprofen (ADVIL/MOTRIN) 200 MG tablet Take 400 mg by mouth every 6 hours as needed for mild pain (knee pain)  Yes Unknown, Entered By History   isosorbide mononitrate (IMDUR) 30 MG 24 hr tablet Take 1 tablet (30 mg) by mouth daily 3/5/2019 at Unknown time Yes Lars Martines MD   lisinopril (PRINIVIL/ZESTRIL) 10 MG tablet Take 3 tablets (30 mg) by mouth daily 3/5/2019 at Unknown time Yes Marcella House PA-C   loratadine (CLARITIN) 10 MG tablet Take 10 mg by mouth At Bedtime  3/4/2019 at Unknown time Yes Reported, Patient   metFORMIN (GLUCOPHAGE) 1000 MG tablet TAKE 1 TABLET (1,000 MG) BY MOUTH 2 TIMES DAILY (WITH MEALS) **DUE FOR APRIL APPT/LABS. CALL MD** 3/4/2019 at Unknown time Yes Shannon Ta MD   metoprolol succinate ER (TOPROL-XL) 25 MG 24 hr tablet Take 2 tablets (50 mg) by mouth daily 3/5/2019 at Unknown time Yes Lars Martines MD   Multiple Vitamins-Minerals (MULTIVITAMIN ADULTS PO) Take 1 tablet by mouth daily  3/4/2019 at Unknown time Yes Reported, Patient   nitroFURantoin macrocrystal-monohydrate (MACROBID) 100 MG capsule Take 1 capsule (100 mg) by mouth 2 times daily for 5 days 3/4/2019 at Unknown time Yes Nalini Lernre PA-C   oxyCODONE (ROXICODONE) 5 MG tablet Take 1 tablet (5 mg) by mouth every 6 hours as needed for pain  Yes Nalini Lerner PA-C   tamsulosin (FLOMAX) 0.4 MG 24 hr capsule Take 1 capsule by mouth daily. 3/4/2019 at Unknown time Yes Reported, Patient   blood glucose (ACCU-CHEK SMARTVIEW) test strip Use to test blood sugar 1 times daily or as directed.   Anh Spivey, NP

## 2019-03-05 NOTE — IP AVS SNAPSHOT
[facility] Cardiac Cath Lab  [address]  [facility] [address]  Phone:  [phone]  Fax:  [phone]                                    After Visit Summary   3/5/2019    Lance Ibrahim    MRN: [id number]           After Visit Summary Signature Page    I have received my discharge instructions, and my questions have been answered. I have discussed any challenges I see with this plan with the nurse or doctor.    ..........................................................................................................................................  Patient/Patient Representative Signature      ..........................................................................................................................................  Patient Representative Print Name and Relationship to Patient    ..................................................               ................................................  Date                                   Time    ..........................................................................................................................................  Reviewed by Signature/Title    ...................................................              ..............................................  Date                                               Time          22EPIC Rev 08/18

## 2019-03-05 NOTE — PROGRESS NOTES
Critical care note 30min  I called dr beatty with results  Pt with htn emergency, oozing around 4fr RFA sheath, sbp 200+  Over the course of 30 min (or more)  Gave iv bb  Iv lasix, sl nifedipine  Iv vasotec iv hydralazine   after 40 minutes bp now 140/65  HR 70-80s  Pt is comfortable, and sheath out  Discussed case with dr beatty  He will see pt next week  See new med orders  Note ct of abd (kidney stone)-stable L hydronephrosis, stone, adrenal ?adenoma (may need johnson for htn-but no h/o low k) and increased pleural effusion-likely worsening chf  lvedp was 32 and we gave iv diuretic  Pt told to hold glucophage for now

## 2019-03-05 NOTE — DISCHARGE INSTRUCTIONS
Interventional Cardiology   Discharge Instructions         Post Angiogram (Cardiac)      AFTER YOU GO HOME:    DO NOT drive or operate machinery at home or at work for at least 24 hours    If you were given sedation: we recommend that an adult stay with you for 24 hours    DO relax and take it easy for 24 hours    DO drink plenty of fluids    DO resume your regular diet, unless otherwise instructed by your Primary Physician    DO NOT SMOKE FOR AT LEAST 24 HOURS. If you have been given any mediations that were to help you relax or sedate you during your procedure    DO NOT drink alcoholic beverages the day of your procedure    DO NOT do any strenuous exercise or lifting (>10 lbs) for at least 3-5 days following your procedure    DO NOT take a tub bath, get in a hot tub, or pool for at least 2 days following your procedure    Do Not scrub the procedure site vigorously    DO NOT make any important or legal decisions for 24 hours following your procedure    CALL YOUR PRIMARY PHYSICIAN IF:    You start bleeding from the procedure site. If you do start to bleed from that site, lie down flat and hold pressure on the site. Your physician will tell you if you need to return to the hospital. It is common to have a small bruise or lump at the site    You have numbness, coolness or change in color of the arm or leg of the puncture site    You experience pain or redness at the puncture site    You develop hives or a rash or unexplained itching    You develop a temperature of 101 degrees F or greater    ADDITIONAL INSTRUCTIONS     Support the puncture site for coughing, sneezing, or moving your bowels for the first 48 hours    No lotion or powder to the puncture site for 3 days    Right at Home Deb Tuesday 6-8PM, Anne 9-11AM and Char 4:30-6:30PM Wednesday, Deb 9-11AM and Char 4:30-6:30PM Thursday

## 2019-03-06 ENCOUNTER — TELEPHONE (OUTPATIENT)
Dept: CARDIOLOGY | Facility: CLINIC | Age: 74
End: 2019-03-06

## 2019-03-06 LAB
APPEARANCE STONE: NORMAL
COMPN STONE: NORMAL
INTERPRETATION ECG - MUSE: NORMAL
NUMBER STONE: NORMAL
SIZE STONE: NORMAL MM
WT STONE: 2 MG

## 2019-03-06 NOTE — PROGRESS NOTES
VSS. A/O x4. IV removed. Groin site WDL. Tolerated PO intake. Able to ambulate. Discharge instructions reviewed with patient and friend. Patient left in wheelchair with staff, friend providing ride home.

## 2019-03-06 NOTE — TELEPHONE ENCOUNTER
Patient s/p Heart cath yesterday 3/5/19 and wanted to clarify medication changes. RN reviewed medication changes with patient, RN also confirmed that discharge AVS has the exact same information. Patient will continue to hold metformin until BMP completed and we call him with results. Patient verbalized understanding of the medication changes and repeated back to RN the changes. Patient denies any significant pain at his groin puncture site (R fem artery). RN reviewed with patient that ok to remove dressing around puncture site after 24-48 hours, patient will shower only for the next 3-5 days. If bleeding occurs at puncture site, patient aware to sit down, apply pressure and call 911. Patient will come to ED for evaluation if fever >101. Patient will call clinic with any further questions or concerns. Patient has f/u on 3/12/19 with EMELY Maria Fernanda. Dr. Linda, per note would like patient to be seen sooner, transferred patient to scheduling to arrange earlier f/u apt.

## 2019-03-07 ENCOUNTER — OFFICE VISIT (OUTPATIENT)
Dept: CARDIOLOGY | Facility: CLINIC | Age: 74
End: 2019-03-07
Attending: INTERNAL MEDICINE
Payer: COMMERCIAL

## 2019-03-07 VITALS
OXYGEN SATURATION: 97 % | HEIGHT: 72 IN | SYSTOLIC BLOOD PRESSURE: 108 MMHG | DIASTOLIC BLOOD PRESSURE: 56 MMHG | HEART RATE: 92 BPM | BODY MASS INDEX: 33.58 KG/M2 | WEIGHT: 247.9 LBS

## 2019-03-07 DIAGNOSIS — R07.9 CHEST PAIN, UNSPECIFIED TYPE: ICD-10-CM

## 2019-03-07 DIAGNOSIS — R06.09 DYSPNEA ON EXERTION: ICD-10-CM

## 2019-03-07 DIAGNOSIS — I42.9 CARDIOMYOPATHY, UNSPECIFIED TYPE (H): ICD-10-CM

## 2019-03-07 DIAGNOSIS — R94.39 ABNORMAL STRESS TEST: ICD-10-CM

## 2019-03-07 LAB
ANION GAP SERPL CALCULATED.3IONS-SCNC: 8 MMOL/L (ref 3–14)
BUN SERPL-MCNC: 23 MG/DL (ref 7–30)
CALCIUM SERPL-MCNC: 9.3 MG/DL (ref 8.5–10.1)
CHLORIDE SERPL-SCNC: 109 MMOL/L (ref 94–109)
CO2 SERPL-SCNC: 26 MMOL/L (ref 20–32)
CREAT SERPL-MCNC: 1.64 MG/DL (ref 0.66–1.25)
GFR SERPL CREATININE-BSD FRML MDRD: 40 ML/MIN/{1.73_M2}
GLUCOSE SERPL-MCNC: 142 MG/DL (ref 70–99)
POTASSIUM SERPL-SCNC: 3.4 MMOL/L (ref 3.4–5.3)
SODIUM SERPL-SCNC: 143 MMOL/L (ref 133–144)

## 2019-03-07 PROCEDURE — 99214 OFFICE O/P EST MOD 30 MIN: CPT | Performed by: PHYSICIAN ASSISTANT

## 2019-03-07 PROCEDURE — 36415 COLL VENOUS BLD VENIPUNCTURE: CPT | Performed by: INTERNAL MEDICINE

## 2019-03-07 PROCEDURE — 80048 BASIC METABOLIC PNL TOTAL CA: CPT | Performed by: INTERNAL MEDICINE

## 2019-03-07 ASSESSMENT — MIFFLIN-ST. JEOR: SCORE: 1902.6

## 2019-03-07 NOTE — LETTER
3/7/2019    Anh Spivey NP  [address] [facility] [address]    RE: Lance Ibrahim       Dear Colleague,    I had the pleasure of seeing Lance Ibrahim in the [facility].    Please see separate dictation for HPI, PHYSICAL EXAM AND IMPRESSION/PLAN.    CURRENT MEDICATIONS:  Current Outpatient Medications   Medication Sig Dispense Refill     amLODIPine (NORVASC) 5 MG tablet Take 1 tablet (5 mg) by mouth daily 30 tablet 3     aspirin (ASA) 81 MG EC tablet Take 1 tablet (81 mg) by mouth daily 100 tablet 3     atorvastatin (LIPITOR) 10 MG tablet Take 1 tablet (10 mg) by mouth daily 30 tablet 3     Ferrous Gluconate 240 (27 Fe) MG TABS Take 1 tablet by mouth daily        finasteride (PROSCAR) 5 MG tablet Take 5 mg by mouth daily.       fluticasone (FLONASE) 50 MCG/ACT spray Spray 1 spray into both nostrils At Bedtime        isosorbide mononitrate (IMDUR) 30 MG 24 hr tablet Take 1 tablet (30 mg) by mouth daily 30 tablet 11     labetalol (NORMODYNE) 200 MG tablet Take 1 tablet (200 mg) by mouth 2 times daily 60 tablet 3     lisinopril (PRINIVIL/ZESTRIL) 10 MG tablet Take 3 tablets (30 mg) by mouth daily 90 tablet 0     loratadine (CLARITIN) 10 MG tablet Take 10 mg by mouth At Bedtime        metFORMIN (GLUCOPHAGE) 1000 MG tablet TAKE 1 TABLET (1,000 MG) BY MOUTH 2 TIMES DAILY (WITH MEALS) **DUE FOR APRIL APPT/LABS. CALL MD** 180 tablet 1     Multiple Vitamins-Minerals (MULTIVITAMIN ADULTS PO) Take 1 tablet by mouth daily        tamsulosin (FLOMAX) 0.4 MG 24 hr capsule Take 1 capsule by mouth daily.       torsemide (DEMADEX) 20 MG tablet Take 1 tablet (20 mg) by mouth daily 30 tablet 3     blood glucose (ACCU-CHEK SMARTVIEW) test strip Use to test blood sugar 1 times daily or as directed. 100 strip 3     oxyCODONE (ROXICODONE) 5 MG tablet Take 1 tablet (5 mg) by mouth every 6 hours as needed for pain 8 tablet 0       ALLERGIES:     Allergies   Allergen Reactions     Penicillins  "Anaphylaxis     \"anaphylactic\"     Sulfa Drugs      \"itchy rash, swelling of face and hands\"     Ancef [Cefazolin] Rash       PAST MEDICAL HISTORY:  Past Medical History:   Diagnosis Date     Arthritis     osteoarthritis knees     Cerebral artery occlusion with cerebral infarction (H)     TIA 1993, no residual     Chronic rhinitis      Diabetes mellitus (H)      Dyspnea 02/17/2019     HTN (hypertension)      Hypertrophy of prostate with urinary obstruction and other lower urinary tract symptoms (LUTS)      Sleep apnea     doesn't use cpap     TIA (transient ischaemic attack) 1993     Unspecified transient cerebral ischemia 1993    No definite source found       PAST SURGICAL HISTORY:  Past Surgical History:   Procedure Laterality Date     AMPUTATE TOE(S) Left 10/15/2018    Procedure: AMPUTATE TOE(S);  Left third toe amputation;  Surgeon: Ayaka Azevedo DPM, Podiatry/Foot and Ankle Surgery;  Location: [facility] OR     ARTHROPLASTY KNEE Right 12/7/2018    Procedure: Right total knee arthroplasty;  Surgeon: Issa Cunha MD;  Location: [facility] OR     C NONSPECIFIC PROCEDURE  1985    Diastasis recti repair     C NONSPECIFIC PROCEDURE  1987    Ventral hernia repair     C NONSPECIFIC PROCEDURE      ORIF of left shoulder     COLONOSCOPY  3/9/2013    Procedure: COLONOSCOPY;  COLONOSCOPY;  Surgeon: Chau Hogan MD;  Location:  GI     CV HEART CATHETERIZATION WITH POSSIBLE INTERVENTION Left 3/5/2019    Procedure: Coronary Angiogram;  Surgeon: Nas Linda MD;  Location:  HEART CARDIAC CATH LAB     EYE SURGERY  03/13/2017    left eye cataract     FOOT SURGERY  4/2013    cyst removal      HERNIA REPAIR  7/13/2004    ventral        SOCIAL HISTORY:  Social History     Socioeconomic History     Marital status:      Spouse name: None     Number of children: None     Years of education: None     Highest education level: None   Occupational History     Employer: SELF   Social Needs     Financial resource strain: None "     Food insecurity:     Worry: None     Inability: None     Transportation needs:     Medical: None     Non-medical: None   Tobacco Use     Smoking status: Former Smoker     Last attempt to quit: 3/17/1993     Years since quittin.9     Smokeless tobacco: Never Used   Substance and Sexual Activity     Alcohol use: Yes     Comment: Occ, Once a month     Drug use: No     Sexual activity: No   Lifestyle     Physical activity:     Days per week: None     Minutes per session: None     Stress: None   Relationships     Social connections:     Talks on phone: None     Gets together: None     Attends Sabianist service: None     Active member of club or organization: None     Attends meetings of clubs or organizations: None     Relationship status: None     Intimate partner violence:     Fear of current or ex partner: None     Emotionally abused: None     Physically abused: None     Forced sexual activity: None   Other Topics Concern     Parent/sibling w/ CABG, MI or angioplasty before 65F 55M? Not Asked   Social History Narrative     None       FAMILY HISTORY:  Family History   Problem Relation Age of Onset     Family History Negative Mother          86 yo     Cancer Father          76 yo brain     Diabetes Maternal Grandfather          91 yo     Alcohol/Drug Paternal Grandfather                Review of Systems:  Skin:  Negative       Eyes:  Positive for glasses    ENT:  Negative      Respiratory:  Negative       Cardiovascular:  Negative      Gastroenterology: Negative      Genitourinary:  Negative      Musculoskeletal:  Negative      Neurologic:  Negative      Psychiatric:  Negative      Heme/Lymph/Imm:  Negative      Endocrine:  Positive for diabetes       Reviewed. Remainder of the note dictated.    Maria Fernanda Patiño PA-C      Service Date: 2019      HISTORY OF PRESENT ILLNESS:  Lance is a pleasant 73-year-old gentleman who was somewhat urgently added on to my schedule  today for followup after a recent cardiac catheterization at which time his blood pressure was significantly elevated.      The patient was referred to the Cardiology office about 2 weeks ago after he had presented to the emergency room with some rather sudden onset substernal chest discomfort that was associated with shortness of breath.  The patient tells me that this first occurred on 02/17.  Again, he sought attention in the emergency room.  He was given sublingual nitro which eliminated his discomfort.  His EKG did not show any acute changes.  His troponin was detectable, but was still within normal limits.  He did have a CT scan of his chest which did not show any evidence of pulmonary embolism.  He then underwent a nuclear stress test which was felt to be somewhat equivocal.  This revealed a fixed mild inferior defect and a second fixed basal anterior defect that was felt likely to be due to attenuation.  His EF on that test was mildly reduced at 49%.  He also had an echocardiogram back in 10/2018 in the setting of sepsis at which time his EF was 45%-50%.  Again, he was referred to the Cardiology office and was seen by Dr. Martines who recommended that he undergo coronary angiography.  Unfortunately, just prior to his angiogram being performed, he developed an episode of acute renal colic.  His creatinine jumped quite a bit.  The procedure was delayed until his creatinine improved.      He had his angiogram just 2 days ago.  It was noted that his apical LAD was subtotally occluded, but that this was a very small vessel, only about 1.5 cm in diameter.  He was noted to have moderate disease in the left main.  It was felt that this was borderline significant by iFR.  It does not appear that Dr. Linda felt that there needed to be any intervention.  Several things were noted during his procedure including severe hypertension for which he received numerous IV medications, very elevated LV filling pressure.   The LVEDP was 32 mmHg.  It was also noted that the patient had some mild hypoxia just lying flat on the table with oxygen saturations dipping in the 90%-92% range.  Several changes were made to his medical therapy including a switch from furosemide to torsemide.  Also, his metoprolol XL was changed to labetalol.  Additionally, atorvastatin 10 mg daily was added as was amlodipine 5 mg daily.  Due to all these medication changes, it was recommended that he follow up closely in the office today.  Additionally, following his procedure, he did develop a right groin hematoma.      Lance states that his shortness of breath does seem a bit better, although again he really only has had 2 days of change in medication, so it is a bit hard for him to know how much improvement he will experience.  He is tolerating his medical therapy without difficulty.  He has not been having any lightheadedness or dizziness.  He has quite a bit of tenderness in the right groin area.  He continues to hold his metformin as instructed.      CURRENT CARDIAC MEDICATIONS:   1.  Amlodipine 5 mg daily.   2.  Aspirin 81 mg daily.   3.  Atorvastatin 10 mg daily.   4.  Imdur 30 mg daily.   5.  Labetalol 200 mg b.i.d.   6.  Lisinopril 10 mg 3 tablets daily.   7.  Torsemide 20 mg daily.      The remainder of his medications, allergies, and review of systems were reviewed and as are documented separately.      PHYSICAL EXAMINATION:     GENERAL:  The patient is a pleasant 73-year-old gentleman who appears his stated age.  He is in no apparent distress.     VITAL SIGNS:  His blood pressure is 108/56, pulse is 92, weight is 247 pounds.  This is down 8 pounds on our office scale compared to 3 days ago.     PULMONARY:  Breathing is nonlabored and lungs are clear to auscultation.     CARDIAC:  Examination reveals a regular rate and rhythm.  I do not appreciate any significant murmur.   EXTREMITIES:  Lower extremities show no evidence of edema.  He does have a  large, approximately 4x10 cm hematoma with associated ecchymosis in the right groin area.   NEUROLOGIC:  Alert and oriented.      He had a basic metabolic panel today.  His sodium was 143, potassium is 3.4, BUN is 23, creatinine is up a bit at 1.6.      ASSESSMENT AND PLAN:  Mr. Ibrahim is a 73-year-old gentleman who presents to the office today to follow up after a recent coronary angiogram and to re-evaluate his blood pressure.  His blood pressure is much better controlled with the above-mentioned changes.  He is overall not having any side effects from the medication changes and not having any symptoms of low blood pressure.  His dyspnea on exertion has improved and again this is after only less than 48 hours after his medication changes.      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  He was started on low potency atorvastatin for this.  We could consider trying to increase the dose in the future.  He is already on aspirin therapy.  Medical management was recommended.  He also was noted to have moderate disease in the left main.  Medical management was recommended for the time being, but again we will have to keep a close eye on this.      Dr. Linda felt that his cardiomyopathy likely has ischemic and nonischemic components, possibly due to severe hypertension.  Of course, we will get his blood pressure under better control and want to reassess this.      His creatinine did jump up a bit today.  I have asked him not to restart his metformin.  We will recheck his creatinine early next week and if it has improved, then he can restart the metformin.      I will see him back in about 2-3 weeks to reassess his blood pressure and how he is doing with his medical therapy.         GABRIELLE GILLIAM PA-C             D: 2019   T: 2019   MT: JAYE      Name:     CURTIS IBRAHIM   MRN:      [id number]        Account:      [id number]   :      1945           Service Date:  03/07/2019      Document: [id number]       Thank you for allowing me to participate in the care of your patient.      Sincerely,     Maria Fernanda Patiño PA-C     [facility]    cc:   Lars Martines MD  [address]  [place] [address]

## 2019-03-07 NOTE — PATIENT INSTRUCTIONS
Thank you for your [facility] visit today. Your provider has recommended the following:  Medication Changes:  No changes today  Recommendations:  Repeat lab early next week (non-fasting)  Follow-up:  See Maria Fernanda RADFORD for cardiology follow up in 2-3 weeks.    Reminder:  Please bring in your current medication list or your medication, over the counter supplements and vitamin bottles as we will review these at each office visit.

## 2019-03-07 NOTE — PROGRESS NOTES
"Please see separate dictation for HPI, PHYSICAL EXAM AND IMPRESSION/PLAN.    CURRENT MEDICATIONS:  Current Outpatient Medications   Medication Sig Dispense Refill     amLODIPine (NORVASC) 5 MG tablet Take 1 tablet (5 mg) by mouth daily 30 tablet 3     aspirin (ASA) 81 MG EC tablet Take 1 tablet (81 mg) by mouth daily 100 tablet 3     atorvastatin (LIPITOR) 10 MG tablet Take 1 tablet (10 mg) by mouth daily 30 tablet 3     Ferrous Gluconate 240 (27 Fe) MG TABS Take 1 tablet by mouth daily        finasteride (PROSCAR) 5 MG tablet Take 5 mg by mouth daily.       fluticasone (FLONASE) 50 MCG/ACT spray Spray 1 spray into both nostrils At Bedtime        isosorbide mononitrate (IMDUR) 30 MG 24 hr tablet Take 1 tablet (30 mg) by mouth daily 30 tablet 11     labetalol (NORMODYNE) 200 MG tablet Take 1 tablet (200 mg) by mouth 2 times daily 60 tablet 3     lisinopril (PRINIVIL/ZESTRIL) 10 MG tablet Take 3 tablets (30 mg) by mouth daily 90 tablet 0     loratadine (CLARITIN) 10 MG tablet Take 10 mg by mouth At Bedtime        metFORMIN (GLUCOPHAGE) 1000 MG tablet TAKE 1 TABLET (1,000 MG) BY MOUTH 2 TIMES DAILY (WITH MEALS) **DUE FOR APRIL APPT/LABS. CALL MD** 180 tablet 1     Multiple Vitamins-Minerals (MULTIVITAMIN ADULTS PO) Take 1 tablet by mouth daily        tamsulosin (FLOMAX) 0.4 MG 24 hr capsule Take 1 capsule by mouth daily.       torsemide (DEMADEX) 20 MG tablet Take 1 tablet (20 mg) by mouth daily 30 tablet 3     blood glucose (ACCU-CHEK SMARTVIEW) test strip Use to test blood sugar 1 times daily or as directed. 100 strip 3     oxyCODONE (ROXICODONE) 5 MG tablet Take 1 tablet (5 mg) by mouth every 6 hours as needed for pain 8 tablet 0       ALLERGIES:     Allergies   Allergen Reactions     Penicillins Anaphylaxis     \"anaphylactic\"     Sulfa Drugs      \"itchy rash, swelling of face and hands\"     Ancef [Cefazolin] Rash       PAST MEDICAL HISTORY:  Past Medical History:   Diagnosis Date     Arthritis     osteoarthritis " knees     Cerebral artery occlusion with cerebral infarction (H)     TIA 1993, no residual     Chronic rhinitis      Diabetes mellitus (H)      Dyspnea 02/17/2019     HTN (hypertension)      Hypertrophy of prostate with urinary obstruction and other lower urinary tract symptoms (LUTS)      Sleep apnea     doesn't use cpap     TIA (transient ischaemic attack) 1993     Unspecified transient cerebral ischemia 1993    No definite source found       PAST SURGICAL HISTORY:  Past Surgical History:   Procedure Laterality Date     AMPUTATE TOE(S) Left 10/15/2018    Procedure: AMPUTATE TOE(S);  Left third toe amputation;  Surgeon: Ayaka Azevedo DPM, Podiatry/Foot and Ankle Surgery;  Location: [facility] OR     ARTHROPLASTY KNEE Right 12/7/2018    Procedure: Right total knee arthroplasty;  Surgeon: Issa Cunha MD;  Location: [facility] OR     C NONSPECIFIC PROCEDURE  1985    Diastasis recti repair     C NONSPECIFIC PROCEDURE  1987    Ventral hernia repair     C NONSPECIFIC PROCEDURE      ORIF of left shoulder     COLONOSCOPY  3/9/2013    Procedure: COLONOSCOPY;  COLONOSCOPY;  Surgeon: Chau Hogan MD;  Location:  GI     CV HEART CATHETERIZATION WITH POSSIBLE INTERVENTION Left 3/5/2019    Procedure: Coronary Angiogram;  Surgeon: Nas Linda MD;  Location:  HEART CARDIAC CATH LAB     EYE SURGERY  03/13/2017    left eye cataract     FOOT SURGERY  4/2013    cyst removal      HERNIA REPAIR  7/13/2004    ventral        SOCIAL HISTORY:  Social History     Socioeconomic History     Marital status:      Spouse name: None     Number of children: None     Years of education: None     Highest education level: None   Occupational History     Employer: SELF   Social Needs     Financial resource strain: None     Food insecurity:     Worry: None     Inability: None     Transportation needs:     Medical: None     Non-medical: None   Tobacco Use     Smoking status: Former Smoker     Last attempt to quit: 3/17/1993     Years  since quittin.9     Smokeless tobacco: Never Used   Substance and Sexual Activity     Alcohol use: Yes     Comment: Occ, Once a month     Drug use: No     Sexual activity: No   Lifestyle     Physical activity:     Days per week: None     Minutes per session: None     Stress: None   Relationships     Social connections:     Talks on phone: None     Gets together: None     Attends Pentecostal service: None     Active member of club or organization: None     Attends meetings of clubs or organizations: None     Relationship status: None     Intimate partner violence:     Fear of current or ex partner: None     Emotionally abused: None     Physically abused: None     Forced sexual activity: None   Other Topics Concern     Parent/sibling w/ CABG, MI or angioplasty before 65F 55M? Not Asked   Social History Narrative     None       FAMILY HISTORY:  Family History   Problem Relation Age of Onset     Family History Negative Mother          86 yo     Cancer Father          74 yo brain     Diabetes Maternal Grandfather          91 yo     Alcohol/Drug Paternal Grandfather                Review of Systems:  Skin:  Negative       Eyes:  Positive for glasses    ENT:  Negative      Respiratory:  Negative       Cardiovascular:  Negative      Gastroenterology: Negative      Genitourinary:  Negative      Musculoskeletal:  Negative      Neurologic:  Negative      Psychiatric:  Negative      Heme/Lymph/Imm:  Negative      Endocrine:  Positive for diabetes       Reviewed. Remainder of the note dictated.    Maria Fernanda Patiño PA-C

## 2019-03-07 NOTE — PROGRESS NOTES
Service Date: 03/07/2019      HISTORY OF PRESENT ILLNESS:  Lance is a pleasant 73-year-old gentleman who was somewhat urgently added on to my schedule today for followup after a recent cardiac catheterization at which time his blood pressure was significantly elevated.      The patient was referred to the Cardiology office about 2 weeks ago after he had presented to the emergency room with some rather sudden onset substernal chest discomfort that was associated with shortness of breath.  The patient tells me that this first occurred on 02/17.  Again, he sought attention in the emergency room.  He was given sublingual nitro which eliminated his discomfort.  His EKG did not show any acute changes.  His troponin was detectable, but was still within normal limits.  He did have a CT scan of his chest which did not show any evidence of pulmonary embolism.  He then underwent a nuclear stress test which was felt to be somewhat equivocal.  This revealed a fixed mild inferior defect and a second fixed basal anterior defect that was felt likely to be due to attenuation.  His EF on that test was mildly reduced at 49%.  He also had an echocardiogram back in 10/2018 in the setting of sepsis at which time his EF was 45%-50%.  Again, he was referred to the Cardiology office and was seen by Dr. Martines who recommended that he undergo coronary angiography.  Unfortunately, just prior to his angiogram being performed, he developed an episode of acute renal colic.  His creatinine jumped quite a bit.  The procedure was delayed until his creatinine improved.      He had his angiogram just 2 days ago.  It was noted that his apical LAD was subtotally occluded, but that this was a very small vessel, only about 1.5 cm in diameter.  He was noted to have moderate disease in the left main.  It was felt that this was borderline significant by iFR.  It does not appear that Dr. Linda felt that there needed to be any intervention.  Several  things were noted during his procedure including severe hypertension for which he received numerous IV medications, very elevated LV filling pressure.  The LVEDP was 32 mmHg.  It was also noted that the patient had some mild hypoxia just lying flat on the table with oxygen saturations dipping in the 90%-92% range.  Several changes were made to his medical therapy including a switch from furosemide to torsemide.  Also, his metoprolol XL was changed to labetalol.  Additionally, atorvastatin 10 mg daily was added as was amlodipine 5 mg daily.  Due to all these medication changes, it was recommended that he follow up closely in the office today.  Additionally, following his procedure, he did develop a right groin hematoma.      Lance states that his shortness of breath does seem a bit better, although again he really only has had 2 days of change in medication, so it is a bit hard for him to know how much improvement he will experience.  He is tolerating his medical therapy without difficulty.  He has not been having any lightheadedness or dizziness.  He has quite a bit of tenderness in the right groin area.  He continues to hold his metformin as instructed.      CURRENT CARDIAC MEDICATIONS:   1.  Amlodipine 5 mg daily.   2.  Aspirin 81 mg daily.   3.  Atorvastatin 10 mg daily.   4.  Imdur 30 mg daily.   5.  Labetalol 200 mg b.i.d.   6.  Lisinopril 10 mg 3 tablets daily.   7.  Torsemide 20 mg daily.      The remainder of his medications, allergies, and review of systems were reviewed and as are documented separately.      PHYSICAL EXAMINATION:     GENERAL:  The patient is a pleasant 73-year-old gentleman who appears his stated age.  He is in no apparent distress.     VITAL SIGNS:  His blood pressure is 108/56, pulse is 92, weight is 247 pounds.  This is down 8 pounds on our office scale compared to 3 days ago.     PULMONARY:  Breathing is nonlabored and lungs are clear to auscultation.     CARDIAC:  Examination reveals a  regular rate and rhythm.  I do not appreciate any significant murmur.   EXTREMITIES:  Lower extremities show no evidence of edema.  He does have a large, approximately 4x10 cm hematoma with associated ecchymosis in the right groin area.   NEUROLOGIC:  Alert and oriented.      He had a basic metabolic panel today.  His sodium was 143, potassium is 3.4, BUN is 23, creatinine is up a bit at 1.6.      ASSESSMENT AND PLAN:  Mr. Ibrahim is a 73-year-old gentleman who presents to the office today to follow up after a recent coronary angiogram and to re-evaluate his blood pressure.  His blood pressure is much better controlled with the above-mentioned changes.  He is overall not having any side effects from the medication changes and not having any symptoms of low blood pressure.  His dyspnea on exertion has improved and again this is after only less than 48 hours after his medication changes.      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  He was started on low potency atorvastatin for this.  We could consider trying to increase the dose in the future.  He is already on aspirin therapy.  Medical management was recommended.  He also was noted to have moderate disease in the left main.  Medical management was recommended for the time being, but again we will have to keep a close eye on this.      Dr. Linda felt that his cardiomyopathy likely has ischemic and nonischemic components, possibly due to severe hypertension.  Of course, we will get his blood pressure under better control and want to reassess this.      His creatinine did jump up a bit today.  I have asked him not to restart his metformin.  We will recheck his creatinine early next week and if it has improved, then he can restart the metformin.      I will see him back in about 2-3 weeks to reassess his blood pressure and how he is doing with his medical therapy.         GABRIELLE GILLIAM PA-C             D: 03/07/2019   T: 03/07/2019    MT: JAYE      Name:     CURTIS SAHU   MRN:      [id number]        Account:      [id number]   :      1945           Service Date: 2019      Document: [id number]

## 2019-03-07 NOTE — LETTER
3/7/2019      Anh Spivey NP  [address] [facility] [address]      RE: Lance Ibrahim       Dear Colleague,    I had the pleasure of seeing Lance Ibrahim in the [facility].    Service Date: 03/07/2019      HISTORY OF PRESENT ILLNESS:  Lance is a pleasant 73-year-old gentleman who was somewhat urgently added on to my schedule today for followup after a recent cardiac catheterization at which time his blood pressure was significantly elevated.      The patient was referred to the Cardiology office about 2 weeks ago after he had presented to the emergency room with some rather sudden onset substernal chest discomfort that was associated with shortness of breath.  The patient tells me that this first occurred on 02/17.  Again, he sought attention in the emergency room.  He was given sublingual nitro which eliminated his discomfort.  His EKG did not show any acute changes.  His troponin was detectable, but was still within normal limits.  He did have a CT scan of his chest which did not show any evidence of pulmonary embolism.  He then underwent a nuclear stress test which was felt to be somewhat equivocal.  This revealed a fixed mild inferior defect and a second fixed basal anterior defect that was felt likely to be due to attenuation.  His EF on that test was mildly reduced at 49%.  He also had an echocardiogram back in 10/2018 in the setting of sepsis at which time his EF was 45%-50%.  Again, he was referred to the Cardiology office and was seen by Dr. Martines who recommended that he undergo coronary angiography.  Unfortunately, just prior to his angiogram being performed, he developed an episode of acute renal colic.  His creatinine jumped quite a bit.  The procedure was delayed until his creatinine improved.      He had his angiogram just 2 days ago.  It was noted that his apical LAD was subtotally occluded, but that this was a very small vessel, only about 1.5 cm in  diameter.  He was noted to have moderate disease in the left main.  It was felt that this was borderline significant by iFR.  It does not appear that Dr. Linda felt that there needed to be any intervention.  Several things were noted during his procedure including severe hypertension for which he received numerous IV medications, very elevated LV filling pressure.  The LVEDP was 32 mmHg.  It was also noted that the patient had some mild hypoxia just lying flat on the table with oxygen saturations dipping in the 90%-92% range.  Several changes were made to his medical therapy including a switch from furosemide to torsemide.  Also, his metoprolol XL was changed to labetalol.  Additionally, atorvastatin 10 mg daily was added as was amlodipine 5 mg daily.  Due to all these medication changes, it was recommended that he follow up closely in the office today.  Additionally, following his procedure, he did develop a right groin hematoma.      Lance states that his shortness of breath does seem a bit better, although again he really only has had 2 days of change in medication, so it is a bit hard for him to know how much improvement he will experience.  He is tolerating his medical therapy without difficulty.  He has not been having any lightheadedness or dizziness.  He has quite a bit of tenderness in the right groin area.  He continues to hold his metformin as instructed.      CURRENT CARDIAC MEDICATIONS:   1.  Amlodipine 5 mg daily.   2.  Aspirin 81 mg daily.   3.  Atorvastatin 10 mg daily.   4.  Imdur 30 mg daily.   5.  Labetalol 200 mg b.i.d.   6.  Lisinopril 10 mg 3 tablets daily.   7.  Torsemide 20 mg daily.      The remainder of his medications, allergies, and review of systems were reviewed and as are documented separately.      PHYSICAL EXAMINATION:     GENERAL:  The patient is a pleasant 73-year-old gentleman who appears his stated age.  He is in no apparent distress.     VITAL SIGNS:  His blood pressure is  108/56, pulse is 92, weight is 247 pounds.  This is down 8 pounds on our office scale compared to 3 days ago.     PULMONARY:  Breathing is nonlabored and lungs are clear to auscultation.     CARDIAC:  Examination reveals a regular rate and rhythm.  I do not appreciate any significant murmur.   EXTREMITIES:  Lower extremities show no evidence of edema.  He does have a large, approximately 4x10 cm hematoma with associated ecchymosis in the right groin area.   NEUROLOGIC:  Alert and oriented.      He had a basic metabolic panel today.  His sodium was 143, potassium is 3.4, BUN is 23, creatinine is up a bit at 1.6.      ASSESSMENT AND PLAN:  Mr. Ibrahim is a 73-year-old gentleman who presents to the office today to follow up after a recent coronary angiogram and to re-evaluate his blood pressure.  His blood pressure is much better controlled with the above-mentioned changes.  He is overall not having any side effects from the medication changes and not having any symptoms of low blood pressure.  His dyspnea on exertion has improved and again this is after only less than 48 hours after his medication changes.      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  He was started on low potency atorvastatin for this.  We could consider trying to increase the dose in the future.  He is already on aspirin therapy.  Medical management was recommended.  He also was noted to have moderate disease in the left main.  Medical management was recommended for the time being, but again we will have to keep a close eye on this.      Dr. Linda felt that his cardiomyopathy likely has ischemic and nonischemic components, possibly due to severe hypertension.  Of course, we will get his blood pressure under better control and want to reassess this.      His creatinine did jump up a bit today.  I have asked him not to restart his metformin.  We will recheck his creatinine early next week and if it has improved, then he can  restart the metformin.      I will see him back in about 2-3 weeks to reassess his blood pressure and how he is doing with his medical therapy.         GABRIELLE GILLIAM PA-C             D: 2019   T: 2019   MT: JAYE      Name:     CURTIS SAHU   MRN:      [id number]        Account:      [id number]   :      1945           Service Date: 2019      Document: [id number]         Outpatient Encounter Medications as of 3/7/2019   Medication Sig Dispense Refill     amLODIPine (NORVASC) 5 MG tablet Take 1 tablet (5 mg) by mouth daily 30 tablet 3     aspirin (ASA) 81 MG EC tablet Take 1 tablet (81 mg) by mouth daily 100 tablet 3     atorvastatin (LIPITOR) 10 MG tablet Take 1 tablet (10 mg) by mouth daily 30 tablet 3     Ferrous Gluconate 240 (27 Fe) MG TABS Take 1 tablet by mouth daily        finasteride (PROSCAR) 5 MG tablet Take 5 mg by mouth daily.       fluticasone (FLONASE) 50 MCG/ACT spray Spray 1 spray into both nostrils At Bedtime        isosorbide mononitrate (IMDUR) 30 MG 24 hr tablet Take 1 tablet (30 mg) by mouth daily 30 tablet 11     labetalol (NORMODYNE) 200 MG tablet Take 1 tablet (200 mg) by mouth 2 times daily 60 tablet 3     lisinopril (PRINIVIL/ZESTRIL) 10 MG tablet Take 3 tablets (30 mg) by mouth daily 90 tablet 0     loratadine (CLARITIN) 10 MG tablet Take 10 mg by mouth At Bedtime        metFORMIN (GLUCOPHAGE) 1000 MG tablet TAKE 1 TABLET (1,000 MG) BY MOUTH 2 TIMES DAILY (WITH MEALS) **DUE FOR APRIL APPT/LABS. CALL MD** 180 tablet 1     Multiple Vitamins-Minerals (MULTIVITAMIN ADULTS PO) Take 1 tablet by mouth daily        tamsulosin (FLOMAX) 0.4 MG 24 hr capsule Take 1 capsule by mouth daily.       torsemide (DEMADEX) 20 MG tablet Take 1 tablet (20 mg) by mouth daily 30 tablet 3     blood glucose (ACCU-CHEK SMARTVIEW) test strip Use to test blood sugar 1 times daily or as directed. 100 strip 3     [] nitroFURantoin macrocrystal-monohydrate (MACROBID) 100 MG  capsule Take 1 capsule (100 mg) by mouth 2 times daily for 5 days 10 capsule 0     oxyCODONE (ROXICODONE) 5 MG tablet Take 1 tablet (5 mg) by mouth every 6 hours as needed for pain 8 tablet 0     No facility-administered encounter medications on file as of 3/7/2019.        Again, thank you for allowing me to participate in the care of your patient.      Sincerely,    Maria Fernanda Patiño PA-C     [facility]

## 2019-03-11 DIAGNOSIS — R06.09 DYSPNEA ON EXERTION: ICD-10-CM

## 2019-03-11 LAB
ANION GAP SERPL CALCULATED.3IONS-SCNC: 5 MMOL/L (ref 3–14)
BUN SERPL-MCNC: 24 MG/DL (ref 7–30)
CALCIUM SERPL-MCNC: 9.1 MG/DL (ref 8.5–10.1)
CHLORIDE SERPL-SCNC: 106 MMOL/L (ref 94–109)
CO2 SERPL-SCNC: 29 MMOL/L (ref 20–32)
CREAT SERPL-MCNC: 1.46 MG/DL (ref 0.66–1.25)
GFR SERPL CREATININE-BSD FRML MDRD: 46 ML/MIN/{1.73_M2}
GLUCOSE SERPL-MCNC: 146 MG/DL (ref 70–99)
POTASSIUM SERPL-SCNC: 3.9 MMOL/L (ref 3.4–5.3)
SODIUM SERPL-SCNC: 140 MMOL/L (ref 133–144)

## 2019-03-11 PROCEDURE — 80048 BASIC METABOLIC PNL TOTAL CA: CPT | Performed by: PHYSICIAN ASSISTANT

## 2019-03-11 PROCEDURE — 36415 COLL VENOUS BLD VENIPUNCTURE: CPT | Performed by: PHYSICIAN ASSISTANT

## 2019-03-13 ENCOUNTER — TELEPHONE (OUTPATIENT)
Dept: CARDIOLOGY | Facility: CLINIC | Age: 74
End: 2019-03-13

## 2019-03-13 DIAGNOSIS — I42.9 CARDIOMYOPATHY, UNSPECIFIED TYPE (H): ICD-10-CM

## 2019-03-13 DIAGNOSIS — I10 ESSENTIAL HYPERTENSION: Primary | ICD-10-CM

## 2019-03-13 NOTE — TELEPHONE ENCOUNTER
Spoke w/ pt. Pt will take an extra 20 mg of Torsemide today. And begin to take 40 mg of Torsemide starting tomorrow morning. Pt understands if he feels  worse or uncomfortable tonight he should go in to the ED. Also should go to the ED if he begins to have other sxs such as CP, SOB, lightheadedness/dizziness. Will follow up w/ pt tomorrow 3/14/19. NISA Lawrence

## 2019-03-13 NOTE — TELEPHONE ENCOUNTER
"Received call from pt reporting some \"lung tightness\". Dyspnea started yesterday morning. Feels like he can't really a deep breath and \"fill his lung all the way\". This is new since he last saw Maria Fernanda MARINO post angio on 3/7/19. Last night pt slept sitting up because it makes it easier to breath compared to laying down. No wt gain recently. Wt is 253 lb. BPs 135-140/75-80 (much improved after stenting). HRs 90s. Denies significant SOB, CP, lightheadedness/dizziness.     Per last OV note, \"Several things were noted during his procedure including severe hypertension for which he received numerous IV medications, very elevated LV filling pressure. LVEDP was 32 mmHg. Pt had some mild hypoxia just lying flat on the table with oxygen saturations dipping in the 90%-92% range. Changes made to medical therapy.\"    Will route to  in Maria Fernanda's absence. Advised pt to go to ED if sxs continue/worsen. NISA Lawrence    "

## 2019-03-13 NOTE — TELEPHONE ENCOUNTER
I would increase his torsemide to 40 mg per day and should take an extra 20 mg now if he has already taken his usual 20 mg of torsemide. If he is still feeling dyspneic he needs to go to ER. Amy

## 2019-03-14 NOTE — TELEPHONE ENCOUNTER
"Spoke w/ patient regarding sxs. Pt states he feels worse. Had to sleep sitting up again last night. Describes his breathing like there is a \"cardboard on top of his lungs preventing a deep breath\". Pt also asked if he could get nitroglycerin since he used this a couple years ago and it made him feel better. Sometimes pt sees \"stars or sparkles\" in eyes. 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 Has not moved hardly since angio.     Wt today 245 lb and yesterday he reported wt 253 lb. 7lb drop after increasing Torsemide to 40 mg a day? Denies CP, coughing, significant dizziness. Can't really assess edema.     Pt quickly had to end the call to make a conference call for work. Requested I call back tomorrow. Pt ok'd me calling Wife (Monique) if I had more recommendations. Will discuss w/ Maria Fernanda Lawrence, RN     "

## 2019-03-14 NOTE — TELEPHONE ENCOUNTER
Spoke w/ pt's wife (Monique) regarding sxs. Discussed plan w/ Maria Fernanda MARINO. Recommendations for pt to continue w/ increased dose of Torsemide 40 mg. Advised to go to ER if pt truly feels worse and or continues to worsen over night. Will discuss wt and sxs tomorrow morning to see if he continues to lose lbs.     Pt's wife asked regarding potassium supplement. Reviewed recent labs (stable potassium). Agrees w/ recommendations. NISA Lawrence

## 2019-03-15 NOTE — TELEPHONE ENCOUNTER
Called patient. No answer. Left VM w/ recommendations. Will set up labs for next week. NISA Lawrence

## 2019-03-15 NOTE — TELEPHONE ENCOUNTER
"Pt states his sxs feels a little bit better. He doesn't feel his breathing is as restrictive but still feels like there is a limit on how deep he can take a breath (\"like a light belt around chest\"). Pt states he felt so bad yesterday because his neck & back were in pain from sleeping w/ all his pillows the night before. Went to Chiropractor yesterday. This helped him a lot.      3/13/19 253 lb   3/14 wt 245 lb   3/15 wt today 243 lb (lost 2 lbs)  /73 HR 91 (8am)  126/57 HR 95 (10am)  Denies lightheadedness/dizziness, SOB, CP.     Continues to see stars/sparkles in eyes. Plans to see eye doctor (new problem).     Discussed diet. Pt eats salmeron,eggs, coffee or cereal for breakfast. Lunch is TV dinners, soup or sandwich. Dinner last night was chinese food. Educated pt on salty foods and how this affects him. Needs further education on this.     NISA Lawrence    ADDENDUM 3/15/2019: Thanks for the update. I would like him to continue on the higher dose of torsemide and have a BMP prior to seeing me next week. Continue daily weights and definitely needs to watch is sodium intake-please send him some info so he can look at it prior to his follow up. Maria Fernanda Patiño, SVETLANA     "

## 2019-03-18 DIAGNOSIS — I10 ESSENTIAL HYPERTENSION: ICD-10-CM

## 2019-03-18 DIAGNOSIS — I42.9 CARDIOMYOPATHY, UNSPECIFIED TYPE (H): ICD-10-CM

## 2019-03-18 LAB
ANION GAP SERPL CALCULATED.3IONS-SCNC: 9 MMOL/L (ref 3–14)
BUN SERPL-MCNC: 43 MG/DL (ref 7–30)
CALCIUM SERPL-MCNC: 9.4 MG/DL (ref 8.5–10.1)
CHLORIDE SERPL-SCNC: 103 MMOL/L (ref 94–109)
CO2 SERPL-SCNC: 26 MMOL/L (ref 20–32)
CREAT SERPL-MCNC: 2.01 MG/DL (ref 0.66–1.25)
GFR SERPL CREATININE-BSD FRML MDRD: 32 ML/MIN/{1.73_M2}
GLUCOSE SERPL-MCNC: 142 MG/DL (ref 70–99)
POTASSIUM SERPL-SCNC: 4 MMOL/L (ref 3.4–5.3)
SODIUM SERPL-SCNC: 138 MMOL/L (ref 133–144)

## 2019-03-18 PROCEDURE — 36415 COLL VENOUS BLD VENIPUNCTURE: CPT | Performed by: PHYSICIAN ASSISTANT

## 2019-03-18 PROCEDURE — 80048 BASIC METABOLIC PNL TOTAL CA: CPT | Performed by: PHYSICIAN ASSISTANT

## 2019-03-18 NOTE — TELEPHONE ENCOUNTER
Received call w/ updates from pt.     /68 HR 92.  Breathing is better but not 100% normal. Wt today 238 lb. Stopped his 2nd tablet of Torsemide on sat d/t urinating every 2 hours throughout day/night. Pt has barely slept since starting increased Torsemide dose.   Feels extremely thirsty.     Wts:  3/13 253 lb   3/14 245 lb  3/15 243 lb  3/16 - 3/17 no wts on weekend (switched to torsemide 20 mg daily)  3/18 238 lb TODAY    Denies lightheadedness, dizziness.   BMP was scheduled for today. OV 3/21/19. NISA Lawrence

## 2019-03-19 ENCOUNTER — TRANSFERRED RECORDS (OUTPATIENT)
Dept: HEALTH INFORMATION MANAGEMENT | Facility: CLINIC | Age: 74
End: 2019-03-19

## 2019-03-21 ENCOUNTER — OFFICE VISIT (OUTPATIENT)
Dept: CARDIOLOGY | Facility: CLINIC | Age: 74
End: 2019-03-21
Attending: PHYSICIAN ASSISTANT
Payer: COMMERCIAL

## 2019-03-21 VITALS
SYSTOLIC BLOOD PRESSURE: 118 MMHG | BODY MASS INDEX: 33.86 KG/M2 | DIASTOLIC BLOOD PRESSURE: 58 MMHG | HEIGHT: 72 IN | HEART RATE: 80 BPM | WEIGHT: 250 LBS

## 2019-03-21 DIAGNOSIS — I10 ESSENTIAL HYPERTENSION: Primary | ICD-10-CM

## 2019-03-21 DIAGNOSIS — I42.9 CARDIOMYOPATHY, UNSPECIFIED TYPE (H): ICD-10-CM

## 2019-03-21 DIAGNOSIS — R06.09 DYSPNEA ON EXERTION: ICD-10-CM

## 2019-03-21 PROCEDURE — 99214 OFFICE O/P EST MOD 30 MIN: CPT | Performed by: PHYSICIAN ASSISTANT

## 2019-03-21 RX ORDER — ACETAMINOPHEN 500 MG
500-1000 TABLET ORAL EVERY 6 HOURS PRN
COMMUNITY
End: 2019-07-19

## 2019-03-21 ASSESSMENT — MIFFLIN-ST. JEOR: SCORE: 1911.99

## 2019-03-21 NOTE — LETTER
3/21/2019      Anh Spivey NP  [address] [facility] [address]      RE: Lance Ibrahim       Dear Colleague,    I had the pleasure of seeing Lance Ibrahim in the [facility].    Service Date: 03/21/2019      HISTORY OF PRESENT ILLNESS:  Lance is a pleasant 73-year-old gentleman who presents to the office today for a 2-week followup.      He was referred to the cardiology office about a month ago after he presented to the Emergency Department with rather sudden onset substernal chest discomfort that was associated with shortness of breath.  He sought evaluation in the Emergency Department.  His troponin was detectable but still was within normal limits.  He subsequently underwent a nuclear stress test which was felt to be somewhat equivocal with a fixed mild inferior defect and a second fixed defect of the basal anterior wall.  His EF was mildly reduced at 49%.  It was also noted that he had had an echocardiogram back in 10/2018 in the setting of sepsis, at which time his EF was 45%-50%.  He was referred to the Cardiology office following that workup, and it was recommended that he undergo coronary angiography.      His coronary angiogram revealed that the apical LAD was subtotally occluded but that this was a very small vessel (only about 1.5 cm in diameter).  Medical management was recommended.  He was noted to have moderate disease in the left main that was felt to be borderline significant by iFR, but Dr. Linda felt that no intervention was needed.  Dr. Linda did note several things during his procedure including severe hypertension, for which he received numerous IV medications, and a very elevated LV filling pressure with an LVEDP of 32 mmHg.  It was also noted that the patient had some mild hypoxia just lying flat on the table with oxygen saturations dipping into the 90%-92% range.  Several changes were made to his medical therapy including a change from  "furosemide to torsemide.  Also, his metoprolol XL was changed to labetalol in addition to atorvastatin 10 mg daily was added to his regimen as was amlodipine 5 mg daily.  Due to all these medication changes, the patient was somewhat urgently added on to my schedule 2 days after his procedure.  Additionally, unfortunately following his procedure, he did develop a large right groin hematoma.      I saw the patient earlier this month for followup.  At that point, his shortness of breath did seem a bit better, but it really had only been less than 48 hours since the change in medical therapy, so it was a bit hard for him to assess how he was feeling.  I did not change any of his medical therapy at that time but asked him to follow up in the office in a couple of weeks so we could reassess his blood pressure and see how he was doing with the medical therapy.      Last week he called the office complaining of dyspnea and \"tightness\" in his lungs.  He noted that he had to prop himself up on several pillows in order to sleep the night prior.  His weight at that time was 253 pounds.  I increased his torsemide to 40 mg daily.  We followed up by phone the following day.  He noted a 7-pound weight loss but stated that his dyspnea did not seem to improve.  Torsemide 40 mg daily was continued.  The following day we again contacted him by phone.  At that time, his dyspnea had improved, his weight was down another 2 pounds to 243 pounds.  Then, over the weekend the patient self-reduced his torsemide to 20 mg daily due to increased/frequent urination.  We followed up with him on Monday of this week, and his weight was down to 238 pounds and he was feeling better.  It was noted that he was eating a diet full of sodium, including salmeron, TV dinners, soups and Chinese food.  He was instructed to try to follow a low-sodium diet.      At this point, Lance states overall he is feeling fairly well.  His prior dyspnea and orthopnea have " pretty much resolved.  He states he does have a little bit of lower extremity edema, but this is better than it usually is.  His weight on his home scale has been in the 236-240 pound range.      CURRENT CARDIAC MEDICATIONS:   1.  Amlodipine 5 mg daily.   2.  Aspirin 81 mg daily.   3.  Atorvastatin 10 mg daily.   4.  Imdur 30 mg daily.   5.  Labetalol 200 mg b.i.d.   6.  Lisinopril 10 mg 3 tablets daily.   7.  Torsemide he is currently taking 20 mg daily.      The remainder of his medications, allergies and review of systems were reviewed and as are documented separately.      PHYSICAL EXAMINATION:   GENERAL:  The patient is a 73-year-old gentleman who appears his stated age.  He is in no apparent distress.   VITAL SIGNS:  Blood pressure is 118/58, pulse is 80, weight is 250 pounds -- this is similar to when I last saw him in the office on our office scale.     PULMONARY:  Breathing is nonlabored.  Lungs are clear to auscultation.   CARDIAC:  Examination reveals a regular rate and rhythm.  I do not appreciate any murmurs.   EXTREMITIES:  Lower extremities show 1+ pitting edema bilaterally.   NEUROLOGIC:  Alert and oriented.      He did have a basic metabolic panel earlier this week.  His sodium was 138, potassium is 4.0, BUN jumped from 24 to 43.  His creatinine jumped from 1.46 to 2.0.      ASSESSMENT AND PLAN:  Mr. Ibrahim is a 73-year-old gentleman who presents to the office today for followup on his blood pressure and volume status.  His blood pressure seems much better controlled.  Unfortunately, he recently did have an exacerbation of volume overload, likely due to his high-sodium diet.  Fortunately, he seemed to respond to a temporary increase in torsemide.  His creatinine did jump a bit, and I will recheck this early next week.  For the time being, I plan to keep him on the torsemide 20 mg daily.  I have given him information on following a low-sodium diet and a goal to keep his sodium intake less than 2000 mg  daily.      Again, his recent coronary angiogram revealed a subtotal occlusion in the small distal LAD as well as moderate disease in his left main.  He was started on low-potency atorvastatin, but certainly we could consider increasing this in the future.  Unfortunately, time constraints did not allow us to discuss this today.      Again, Dr. Linda previously felt that his cardiomyopathy (EF 45%-50%) likely had ischemic and nonischemic components, possibly due to severe hypertension.  I would like to get his blood pressure and volume status under control for a couple of months, and then certainly we should reassess an echocardiogram later this year.      Again, he will have followup lab work to reassess his creatinine early next week.  We can call him with the results.  He will follow up as previously planned with Dr. Martines in about 6 weeks.  Of course, I encouraged him to contact us sooner with questions, concerns or recurrent symptomatology.         GABRIELLE GILLIAM PA-C             D: 2019   T: 2019   MT: MARTA      Name:     CURTIS SAHU   MRN:      [id number]        Account:      [id number]   :      1945           Service Date: 2019      Document: [id number]           Outpatient Encounter Medications as of 3/21/2019   Medication Sig Dispense Refill     acetaminophen (TYLENOL) 500 MG tablet Take 500-1,000 mg by mouth every 6 hours as needed for mild pain       aspirin (ASA) 81 MG EC tablet Take 1 tablet (81 mg) by mouth daily 100 tablet 3     blood glucose (ACCU-CHEK SMARTVIEW) test strip Use to test blood sugar 1 times daily or as directed. 100 strip 3     Ferrous Gluconate 240 (27 Fe) MG TABS Take 1 tablet by mouth daily        finasteride (PROSCAR) 5 MG tablet Take 5 mg by mouth daily.       fluticasone (FLONASE) 50 MCG/ACT spray Spray 1 spray into both nostrils At Bedtime        isosorbide mononitrate (IMDUR) 30 MG 24 hr tablet Take 1 tablet (30 mg) by mouth daily 30  tablet 11     loratadine (CLARITIN) 10 MG tablet Take 10 mg by mouth At Bedtime        metFORMIN (GLUCOPHAGE) 1000 MG tablet TAKE 1 TABLET (1,000 MG) BY MOUTH 2 TIMES DAILY (WITH MEALS) **DUE FOR APRIL APPT/LABS. CALL MD** 180 tablet 1     Multiple Vitamins-Minerals (MULTIVITAMIN ADULTS PO) Take 1 tablet by mouth daily        tamsulosin (FLOMAX) 0.4 MG 24 hr capsule Take 1 capsule by mouth daily.       [DISCONTINUED] amLODIPine (NORVASC) 5 MG tablet Take 1 tablet (5 mg) by mouth daily 30 tablet 3     [DISCONTINUED] atorvastatin (LIPITOR) 10 MG tablet Take 1 tablet (10 mg) by mouth daily 30 tablet 3     [DISCONTINUED] labetalol (NORMODYNE) 200 MG tablet Take 1 tablet (200 mg) by mouth 2 times daily 60 tablet 3     [DISCONTINUED] lisinopril (PRINIVIL/ZESTRIL) 10 MG tablet Take 3 tablets (30 mg) by mouth daily 90 tablet 0     [DISCONTINUED] oxyCODONE (ROXICODONE) 5 MG tablet Take 1 tablet (5 mg) by mouth every 6 hours as needed for pain 8 tablet 0     [DISCONTINUED] torsemide (DEMADEX) 20 MG tablet Take 1 tablet (20 mg) by mouth daily 30 tablet 3     [DISCONTINUED] nitroFURantoin macrocrystal-monohydrate (MACROBID) 100 MG capsule Take 1 capsule (100 mg) by mouth 2 times daily for 5 days 10 capsule 0     No facility-administered encounter medications on file as of 3/21/2019.        Again, thank you for allowing me to participate in the care of your patient.      Sincerely,    Maria Fernanda Patiño PA-C     [facility]

## 2019-03-21 NOTE — PROGRESS NOTES
Service Date: 03/21/2019      HISTORY OF PRESENT ILLNESS:  Lance is a pleasant 73-year-old gentleman who presents to the office today for a 2-week followup.      He was referred to the cardiology office about a month ago after he presented to the Emergency Department with rather sudden onset substernal chest discomfort that was associated with shortness of breath.  He sought evaluation in the Emergency Department.  His troponin was detectable but still was within normal limits.  He subsequently underwent a nuclear stress test which was felt to be somewhat equivocal with a fixed mild inferior defect and a second fixed defect of the basal anterior wall.  His EF was mildly reduced at 49%.  It was also noted that he had had an echocardiogram back in 10/2018 in the setting of sepsis, at which time his EF was 45%-50%.  He was referred to the Cardiology office following that workup, and it was recommended that he undergo coronary angiography.      His coronary angiogram revealed that the apical LAD was subtotally occluded but that this was a very small vessel (only about 1.5 cm in diameter).  Medical management was recommended.  He was noted to have moderate disease in the left main that was felt to be borderline significant by iFR, but Dr. Linda felt that no intervention was needed.  Dr. Linda did note several things during his procedure including severe hypertension, for which he received numerous IV medications, and a very elevated LV filling pressure with an LVEDP of 32 mmHg.  It was also noted that the patient had some mild hypoxia just lying flat on the table with oxygen saturations dipping into the 90%-92% range.  Several changes were made to his medical therapy including a change from furosemide to torsemide.  Also, his metoprolol XL was changed to labetalol in addition to atorvastatin 10 mg daily was added to his regimen as was amlodipine 5 mg daily.  Due to all these medication changes, the patient was  "somewhat urgently added on to my schedule 2 days after his procedure.  Additionally, unfortunately following his procedure, he did develop a large right groin hematoma.      I saw the patient earlier this month for followup.  At that point, his shortness of breath did seem a bit better, but it really had only been less than 48 hours since the change in medical therapy, so it was a bit hard for him to assess how he was feeling.  I did not change any of his medical therapy at that time but asked him to follow up in the office in a couple of weeks so we could reassess his blood pressure and see how he was doing with the medical therapy.      Last week he called the office complaining of dyspnea and \"tightness\" in his lungs.  He noted that he had to prop himself up on several pillows in order to sleep the night prior.  His weight at that time was 253 pounds.  I increased his torsemide to 40 mg daily.  We followed up by phone the following day.  He noted a 7-pound weight loss but stated that his dyspnea did not seem to improve.  Torsemide 40 mg daily was continued.  The following day we again contacted him by phone.  At that time, his dyspnea had improved, his weight was down another 2 pounds to 243 pounds.  Then, over the weekend the patient self-reduced his torsemide to 20 mg daily due to increased/frequent urination.  We followed up with him on Monday of this week, and his weight was down to 238 pounds and he was feeling better.  It was noted that he was eating a diet full of sodium, including salmeron, TV dinners, soups and Chinese food.  He was instructed to try to follow a low-sodium diet.      At this point, Lance states overall he is feeling fairly well.  His prior dyspnea and orthopnea have pretty much resolved.  He states he does have a little bit of lower extremity edema, but this is better than it usually is.  His weight on his home scale has been in the 236-240 pound range.      CURRENT CARDIAC MEDICATIONS:   1. "  Amlodipine 5 mg daily.   2.  Aspirin 81 mg daily.   3.  Atorvastatin 10 mg daily.   4.  Imdur 30 mg daily.   5.  Labetalol 200 mg b.i.d.   6.  Lisinopril 10 mg 3 tablets daily.   7.  Torsemide he is currently taking 20 mg daily.      The remainder of his medications, allergies and review of systems were reviewed and as are documented separately.      PHYSICAL EXAMINATION:   GENERAL:  The patient is a 73-year-old gentleman who appears his stated age.  He is in no apparent distress.   VITAL SIGNS:  Blood pressure is 118/58, pulse is 80, weight is 250 pounds -- this is similar to when I last saw him in the office on our office scale.     PULMONARY:  Breathing is nonlabored.  Lungs are clear to auscultation.   CARDIAC:  Examination reveals a regular rate and rhythm.  I do not appreciate any murmurs.   EXTREMITIES:  Lower extremities show 1+ pitting edema bilaterally.   NEUROLOGIC:  Alert and oriented.      He did have a basic metabolic panel earlier this week.  His sodium was 138, potassium is 4.0, BUN jumped from 24 to 43.  His creatinine jumped from 1.46 to 2.0.      ASSESSMENT AND PLAN:  Mr. Ibrahim is a 73-year-old gentleman who presents to the office today for followup on his blood pressure and volume status.  His blood pressure seems much better controlled.  Unfortunately, he recently did have an exacerbation of volume overload, likely due to his high-sodium diet.  Fortunately, he seemed to respond to a temporary increase in torsemide.  His creatinine did jump a bit, and I will recheck this early next week.  For the time being, I plan to keep him on the torsemide 20 mg daily.  I have given him information on following a low-sodium diet and a goal to keep his sodium intake less than 2000 mg daily.      Again, his recent coronary angiogram revealed a subtotal occlusion in the small distal LAD as well as moderate disease in his left main.  He was started on low-potency atorvastatin, but certainly we could consider  increasing this in the future.  Unfortunately, time constraints did not allow us to discuss this today.      Again, Dr. Linda previously felt that his cardiomyopathy (EF 45%-50%) likely had ischemic and nonischemic components, possibly due to severe hypertension.  I would like to get his blood pressure and volume status under control for a couple of months, and then certainly we should reassess an echocardiogram later this year.      Again, he will have followup lab work to reassess his creatinine early next week.  We can call him with the results.  He will follow up as previously planned with Dr. Martines in about 6 weeks.  Of course, I encouraged him to contact us sooner with questions, concerns or recurrent symptomatology.         GABRIELLE GILLIAM PA-C             D: 2019   T: 2019   MT: MARTA      Name:     CURTIS SAHU   MRN:      [id number]        Account:      [id number]   :      1945           Service Date: 2019      Document: [id number]

## 2019-03-21 NOTE — PATIENT INSTRUCTIONS
Thank you for your [facility] visit today. Your provider has recommended the following:  Medication Changes:  No change  Recommendations:  Non-fasting blood work next week when you see your PCP  Follow-up:  See Dr Martines for cardiology follow up in May as planned.    Reminder:  Please bring in your current medication list or your medication, over the counter supplements and vitamin bottles as we will review these at each office visit.

## 2019-03-21 NOTE — LETTER
3/21/2019    Anh Spivey NP  [address] [facility] [address]    RE: Lance Ibrahim       Dear Colleague,    I had the pleasure of seeing Lance Ibrahim in the [facility].    Please see separate dictation for HPI, PHYSICAL EXAM AND IMPRESSION/PLAN.    CURRENT MEDICATIONS:  Current Outpatient Medications   Medication Sig Dispense Refill     acetaminophen (TYLENOL) 500 MG tablet Take 500-1,000 mg by mouth every 6 hours as needed for mild pain       amLODIPine (NORVASC) 5 MG tablet Take 1 tablet (5 mg) by mouth daily 30 tablet 3     aspirin (ASA) 81 MG EC tablet Take 1 tablet (81 mg) by mouth daily 100 tablet 3     atorvastatin (LIPITOR) 10 MG tablet Take 1 tablet (10 mg) by mouth daily 30 tablet 3     blood glucose (ACCU-CHEK SMARTVIEW) test strip Use to test blood sugar 1 times daily or as directed. 100 strip 3     Ferrous Gluconate 240 (27 Fe) MG TABS Take 1 tablet by mouth daily        finasteride (PROSCAR) 5 MG tablet Take 5 mg by mouth daily.       fluticasone (FLONASE) 50 MCG/ACT spray Spray 1 spray into both nostrils At Bedtime        isosorbide mononitrate (IMDUR) 30 MG 24 hr tablet Take 1 tablet (30 mg) by mouth daily 30 tablet 11     labetalol (NORMODYNE) 200 MG tablet Take 1 tablet (200 mg) by mouth 2 times daily 60 tablet 3     lisinopril (PRINIVIL/ZESTRIL) 10 MG tablet Take 3 tablets (30 mg) by mouth daily 90 tablet 0     loratadine (CLARITIN) 10 MG tablet Take 10 mg by mouth At Bedtime        metFORMIN (GLUCOPHAGE) 1000 MG tablet TAKE 1 TABLET (1,000 MG) BY MOUTH 2 TIMES DAILY (WITH MEALS) **DUE FOR APRIL APPT/LABS. CALL MD** 180 tablet 1     Multiple Vitamins-Minerals (MULTIVITAMIN ADULTS PO) Take 1 tablet by mouth daily        oxyCODONE (ROXICODONE) 5 MG tablet Take 1 tablet (5 mg) by mouth every 6 hours as needed for pain 8 tablet 0     tamsulosin (FLOMAX) 0.4 MG 24 hr capsule Take 1 capsule by mouth daily.       torsemide (DEMADEX) 20 MG tablet Take 1  "tablet (20 mg) by mouth daily 30 tablet 3       ALLERGIES:     Allergies   Allergen Reactions     Penicillins Anaphylaxis     \"anaphylactic\"     Sulfa Drugs      \"itchy rash, swelling of face and hands\"     Ancef [Cefazolin] Rash       PAST MEDICAL HISTORY:  Past Medical History:   Diagnosis Date     Arthritis     osteoarthritis knees     Cerebral artery occlusion with cerebral infarction (H)     TIA 1993, no residual     Chronic rhinitis      Diabetes mellitus (H)      Dyspnea 02/17/2019     HTN (hypertension)      Hypertrophy of prostate with urinary obstruction and other lower urinary tract symptoms (LUTS)      Sleep apnea     doesn't use cpap     TIA (transient ischaemic attack) 1993     Unspecified transient cerebral ischemia 1993    No definite source found       PAST SURGICAL HISTORY:  Past Surgical History:   Procedure Laterality Date     AMPUTATE TOE(S) Left 10/15/2018    Procedure: AMPUTATE TOE(S);  Left third toe amputation;  Surgeon: Ayaka Azevedo DPM, Podiatry/Foot and Ankle Surgery;  Location: [facility] OR     ARTHROPLASTY KNEE Right 12/7/2018    Procedure: Right total knee arthroplasty;  Surgeon: Issa Cunha MD;  Location: [facility] OR     C NONSPECIFIC PROCEDURE  1985    Diastasis recti repair     C NONSPECIFIC PROCEDURE  1987    Ventral hernia repair     C NONSPECIFIC PROCEDURE      ORIF of left shoulder     COLONOSCOPY  3/9/2013    Procedure: COLONOSCOPY;  COLONOSCOPY;  Surgeon: Chau Hogan MD;  Location:  GI     CV HEART CATHETERIZATION WITH POSSIBLE INTERVENTION Left 3/5/2019    Procedure: Coronary Angiogram;  Surgeon: Nas Linda MD;  Location:  HEART CARDI[facility] CATH LAB     EYE SURGERY  03/13/2017    left eye cataract     FOOT SURGERY  4/2013    cyst removal      HERNIA REPAIR  7/13/2004    ventral        SOCIAL HISTORY:  Social History     Socioeconomic History     Marital status:      Spouse name: None     Number of children: None     Years of education: None     Highest " education level: None   Occupational History     Employer: SELF   Social Needs     Financial resource strain: None     Food insecurity:     Worry: None     Inability: None     Transportation needs:     Medical: None     Non-medical: None   Tobacco Use     Smoking status: Former Smoker     Last attempt to quit: 3/17/1993     Years since quittin.0     Smokeless tobacco: Never Used   Substance and Sexual Activity     Alcohol use: Yes     Comment: Occ, Once a month     Drug use: No     Sexual activity: No   Lifestyle     Physical activity:     Days per week: None     Minutes per session: None     Stress: None   Relationships     Social connections:     Talks on phone: None     Gets together: None     Attends Church service: None     Active member of club or organization: None     Attends meetings of clubs or organizations: None     Relationship status: None     Intimate partner violence:     Fear of current or ex partner: None     Emotionally abused: None     Physically abused: None     Forced sexual activity: None   Other Topics Concern     Parent/sibling w/ CABG, MI or angioplasty before 65F 55M? Not Asked   Social History Narrative     None       FAMILY HISTORY:  Family History   Problem Relation Age of Onset     Family History Negative Mother          86 yo     Cancer Father          76 yo brain     Diabetes Maternal Grandfather          89 yo     Alcohol/Drug Paternal Grandfather                Review of Systems:  Skin:  Positive for bruising     Eyes:  Positive for visual blurring;glasses;cataracts    ENT:  Negative for      Respiratory:  Negative for       Cardiovascular:    edema;Positive for    Gastroenterology: Positive for constipation;diarrhea    Genitourinary:  not assessed      Musculoskeletal:  Positive for back pain;neck pain    Neurologic:  Negative for      Psychiatric:  Positive for   sleep apnea  Heme/Lymph/Imm:  Positive for allergies    Endocrine:  Positive  for diabetes       Reviewed. Remainder of the note dictated.    Maria Fernanda Patiño PA-C    Service Date: 03/21/2019      HISTORY OF PRESENT ILLNESS:  Lance is a pleasant 73-year-old gentleman who presents to the office today for a 2-week followup.      He was referred to the cardiology office about a month ago after he presented to the Emergency Department with rather sudden onset substernal chest discomfort that was associated with shortness of breath.  He sought evaluation in the Emergency Department.  His troponin was detectable but still was within normal limits.  He subsequently underwent a nuclear stress test which was felt to be somewhat equivocal with a fixed mild inferior defect and a second fixed defect of the basal anterior wall.  His EF was mildly reduced at 49%.  It was also noted that he had had an echocardiogram back in 10/2018 in the setting of sepsis, at which time his EF was 45%-50%.  He was referred to the Cardiology office following that workup, and it was recommended that he undergo coronary angiography.      His coronary angiogram revealed that the apical LAD was subtotally occluded but that this was a very small vessel (only about 1.5 cm in diameter).  Medical management was recommended.  He was noted to have moderate disease in the left main that was felt to be borderline significant by iFR, but Dr. Linda felt that no intervention was needed.  Dr. Linda did note several things during his procedure including severe hypertension, for which he received numerous IV medications, and a very elevated LV filling pressure with an LVEDP of 32 mmHg.  It was also noted that the patient had some mild hypoxia just lying flat on the table with oxygen saturations dipping into the 90%-92% range.  Several changes were made to his medical therapy including a change from furosemide to torsemide.  Also, his metoprolol XL was changed to labetalol in addition to atorvastatin 10 mg daily was added to his  "regimen as was amlodipine 5 mg daily.  Due to all these medication changes, the patient was somewhat urgently added on to my schedule 2 days after his procedure.  Additionally, unfortunately following his procedure, he did develop a large right groin hematoma.      I saw the patient earlier this month for followup.  At that point, his shortness of breath did seem a bit better, but it really had only been less than 48 hours since the change in medical therapy, so it was a bit hard for him to assess how he was feeling.  I did not change any of his medical therapy at that time but asked him to follow up in the office in a couple of weeks so we could reassess his blood pressure and see how he was doing with the medical therapy.      Last week he called the office complaining of dyspnea and \"tightness\" in his lungs.  He noted that he had to prop himself up on several pillows in order to sleep the night prior.  His weight at that time was 253 pounds.  I increased his torsemide to 40 mg daily.  We followed up by phone the following day.  He noted a 7-pound weight loss but stated that his dyspnea did not seem to improve.  Torsemide 40 mg daily was continued.  The following day we again contacted him by phone.  At that time, his dyspnea had improved, his weight was down another 2 pounds to 243 pounds.  Then, over the weekend the patient self-reduced his torsemide to 20 mg daily due to increased/frequent urination.  We followed up with him on Monday of this week, and his weight was down to 238 pounds and he was feeling better.  It was noted that he was eating a diet full of sodium, including salmeron, TV dinners, soups and Chinese food.  He was instructed to try to follow a low-sodium diet.      At this point, Lance states overall he is feeling fairly well.  His prior dyspnea and orthopnea have pretty much resolved.  He states he does have a little bit of lower extremity edema, but this is better than it usually is.  His weight " on his home scale has been in the 236-240 pound range.      CURRENT CARDIAC MEDICATIONS:   1.  Amlodipine 5 mg daily.   2.  Aspirin 81 mg daily.   3.  Atorvastatin 10 mg daily.   4.  Imdur 30 mg daily.   5.  Labetalol 200 mg b.i.d.   6.  Lisinopril 10 mg 3 tablets daily.   7.  Torsemide he is currently taking 20 mg daily.      The remainder of his medications, allergies and review of systems were reviewed and as are documented separately.      PHYSICAL EXAMINATION:   GENERAL:  The patient is a 73-year-old gentleman who appears his stated age.  He is in no apparent distress.   VITAL SIGNS:  Blood pressure is 118/58, pulse is 80, weight is 250 pounds -- this is similar to when I last saw him in the office on our office scale.     PULMONARY:  Breathing is nonlabored.  Lungs are clear to auscultation.   CARDIAC:  Examination reveals a regular rate and rhythm.  I do not appreciate any murmurs.   EXTREMITIES:  Lower extremities show 1+ pitting edema bilaterally.   NEUROLOGIC:  Alert and oriented.      He did have a basic metabolic panel earlier this week.  His sodium was 138, potassium is 4.0, BUN jumped from 24 to 43.  His creatinine jumped from 1.46 to 2.0.      ASSESSMENT AND PLAN:  Mr. Ibrahim is a 73-year-old gentleman who presents to the office today for followup on his blood pressure and volume status.  His blood pressure seems much better controlled.  Unfortunately, he recently did have an exacerbation of volume overload, likely due to his high-sodium diet.  Fortunately, he seemed to respond to a temporary increase in torsemide.  His creatinine did jump a bit, and I will recheck this early next week.  For the time being, I plan to keep him on the torsemide 20 mg daily.  I have given him information on following a low-sodium diet and a goal to keep his sodium intake less than 2000 mg daily.      Again, his recent coronary angiogram revealed a subtotal occlusion in the small distal LAD as well as moderate disease in  his left main.  He was started on low-potency atorvastatin, but certainly we could consider increasing this in the future.  Unfortunately, time constraints did not allow us to discuss this today.      Again, Dr. Linda previously felt that his cardiomyopathy (EF 45%-50%) likely had ischemic and nonischemic components, possibly due to severe hypertension.  I would like to get his blood pressure and volume status under control for a couple of months, and then certainly we should reassess an echocardiogram later this year.      Again, he will have followup lab work to reassess his creatinine early next week.  We can call him with the results.  He will follow up as previously planned with Dr. Martines in about 6 weeks.  Of course, I encouraged him to contact us sooner with questions, concerns or recurrent symptomatology.         GABRIELLE GILLIAM PA-C             D: 2019   T: 2019   MT: MARTA      Name:     CURTIS SAHU   MRN:      [id number]        Account:      [id number]   :      1945           Service Date: 2019      Document: [id number]        Thank you for allowing me to participate in the care of your patient.      Sincerely,     Gabrielle Gilliam PA-C     [facility]    cc:   Gabrielle Gilliam PA-C  [address] [place] [address]

## 2019-03-21 NOTE — PROGRESS NOTES
"Please see separate dictation for HPI, PHYSICAL EXAM AND IMPRESSION/PLAN.    CURRENT MEDICATIONS:  Current Outpatient Medications   Medication Sig Dispense Refill     acetaminophen (TYLENOL) 500 MG tablet Take 500-1,000 mg by mouth every 6 hours as needed for mild pain       amLODIPine (NORVASC) 5 MG tablet Take 1 tablet (5 mg) by mouth daily 30 tablet 3     aspirin (ASA) 81 MG EC tablet Take 1 tablet (81 mg) by mouth daily 100 tablet 3     atorvastatin (LIPITOR) 10 MG tablet Take 1 tablet (10 mg) by mouth daily 30 tablet 3     blood glucose (ACCU-CHEK SMARTVIEW) test strip Use to test blood sugar 1 times daily or as directed. 100 strip 3     Ferrous Gluconate 240 (27 Fe) MG TABS Take 1 tablet by mouth daily        finasteride (PROSCAR) 5 MG tablet Take 5 mg by mouth daily.       fluticasone (FLONASE) 50 MCG/ACT spray Spray 1 spray into both nostrils At Bedtime        isosorbide mononitrate (IMDUR) 30 MG 24 hr tablet Take 1 tablet (30 mg) by mouth daily 30 tablet 11     labetalol (NORMODYNE) 200 MG tablet Take 1 tablet (200 mg) by mouth 2 times daily 60 tablet 3     lisinopril (PRINIVIL/ZESTRIL) 10 MG tablet Take 3 tablets (30 mg) by mouth daily 90 tablet 0     loratadine (CLARITIN) 10 MG tablet Take 10 mg by mouth At Bedtime        metFORMIN (GLUCOPHAGE) 1000 MG tablet TAKE 1 TABLET (1,000 MG) BY MOUTH 2 TIMES DAILY (WITH MEALS) **DUE FOR APRIL APPT/LABS. CALL MD** 180 tablet 1     Multiple Vitamins-Minerals (MULTIVITAMIN ADULTS PO) Take 1 tablet by mouth daily        oxyCODONE (ROXICODONE) 5 MG tablet Take 1 tablet (5 mg) by mouth every 6 hours as needed for pain 8 tablet 0     tamsulosin (FLOMAX) 0.4 MG 24 hr capsule Take 1 capsule by mouth daily.       torsemide (DEMADEX) 20 MG tablet Take 1 tablet (20 mg) by mouth daily 30 tablet 3       ALLERGIES:     Allergies   Allergen Reactions     Penicillins Anaphylaxis     \"anaphylactic\"     Sulfa Drugs      \"itchy rash, swelling of face and hands\"     Ancef [Cefazolin] " Rash       PAST MEDICAL HISTORY:  Past Medical History:   Diagnosis Date     Arthritis     osteoarthritis knees     Cerebral artery occlusion with cerebral infarction (H)     TIA 1993, no residual     Chronic rhinitis      Diabetes mellitus (H)      Dyspnea 02/17/2019     HTN (hypertension)      Hypertrophy of prostate with urinary obstruction and other lower urinary tract symptoms (LUTS)      Sleep apnea     doesn't use cpap     TIA (transient ischaemic attack) 1993     Unspecified transient cerebral ischemia 1993    No definite source found       PAST SURGICAL HISTORY:  Past Surgical History:   Procedure Laterality Date     AMPUTATE TOE(S) Left 10/15/2018    Procedure: AMPUTATE TOE(S);  Left third toe amputation;  Surgeon: Ayaka Azevedo DPM, Podiatry/Foot and Ankle Surgery;  Location: [facility] OR     ARTHROPLASTY KNEE Right 12/7/2018    Procedure: Right total knee arthroplasty;  Surgeon: Issa Cunha MD;  Location: [facility] OR     C NONSPECIFIC PROCEDURE  1985    Diastasis recti repair     C NONSPECIFIC PROCEDURE  1987    Ventral hernia repair     C NONSPECIFIC PROCEDURE      ORIF of left shoulder     COLONOSCOPY  3/9/2013    Procedure: COLONOSCOPY;  COLONOSCOPY;  Surgeon: Chau Hogan MD;  Location:  GI     CV HEART CATHETERIZATION WITH POSSIBLE INTERVENTION Left 3/5/2019    Procedure: Coronary Angiogram;  Surgeon: Nas Linda MD;  Location:  HEART CARDIAC CATH LAB     EYE SURGERY  03/13/2017    left eye cataract     FOOT SURGERY  4/2013    cyst removal      HERNIA REPAIR  7/13/2004    ventral        SOCIAL HISTORY:  Social History     Socioeconomic History     Marital status:      Spouse name: None     Number of children: None     Years of education: None     Highest education level: None   Occupational History     Employer: SELF   Social Needs     Financial resource strain: None     Food insecurity:     Worry: None     Inability: None     Transportation needs:     Medical: None      Non-medical: None   Tobacco Use     Smoking status: Former Smoker     Last attempt to quit: 3/17/1993     Years since quittin.0     Smokeless tobacco: Never Used   Substance and Sexual Activity     Alcohol use: Yes     Comment: Occ, Once a month     Drug use: No     Sexual activity: No   Lifestyle     Physical activity:     Days per week: None     Minutes per session: None     Stress: None   Relationships     Social connections:     Talks on phone: None     Gets together: None     Attends [employer] service: None     Active member of club or organization: None     Attends meetings of clubs or organizations: None     Relationship status: None     Intimate partner violence:     Fear of current or ex partner: None     Emotionally abused: None     Physically abused: None     Forced sexual activity: None   Other Topics Concern     Parent/sibling w/ CABG, MI or angioplasty before 65F 55M? Not Asked   Social History Narrative     None       FAMILY HISTORY:  Family History   Problem Relation Age of Onset     Family History Negative Mother          88 yo     Cancer Father          74 yo brain     Diabetes Maternal Grandfather          91 yo     Alcohol/Drug Paternal Grandfather                Review of Systems:  Skin:  Positive for bruising     Eyes:  Positive for visual blurring;glasses;cataracts    ENT:  Negative for      Respiratory:  Negative for       Cardiovascular:    edema;Positive for    Gastroenterology: Positive for constipation;diarrhea    Genitourinary:  not assessed      Musculoskeletal:  Positive for back pain;neck pain    Neurologic:  Negative for      Psychiatric:  Positive for   sleep apnea  Heme/Lymph/Imm:  Positive for allergies    Endocrine:  Positive for diabetes       Reviewed. Remainder of the note dictated.    Maria Fernanda Patiño PA-C

## 2019-03-25 ENCOUNTER — OFFICE VISIT (OUTPATIENT)
Dept: INTERNAL MEDICINE | Facility: CLINIC | Age: 74
End: 2019-03-25
Payer: COMMERCIAL

## 2019-03-25 VITALS
TEMPERATURE: 98.5 F | RESPIRATION RATE: 18 BRPM | HEART RATE: 93 BPM | BODY MASS INDEX: 33.85 KG/M2 | HEIGHT: 72 IN | OXYGEN SATURATION: 94 % | DIASTOLIC BLOOD PRESSURE: 62 MMHG | SYSTOLIC BLOOD PRESSURE: 117 MMHG | WEIGHT: 249.9 LBS

## 2019-03-25 DIAGNOSIS — I42.9 CARDIOMYOPATHY, UNSPECIFIED TYPE (H): ICD-10-CM

## 2019-03-25 DIAGNOSIS — R06.09 DYSPNEA ON EXERTION: ICD-10-CM

## 2019-03-25 DIAGNOSIS — E11.51 TYPE 2 DIABETES MELLITUS WITH PERIPHERAL VASCULAR DISEASE (H): Primary | ICD-10-CM

## 2019-03-25 DIAGNOSIS — R07.9 CHEST PAIN, UNSPECIFIED TYPE: ICD-10-CM

## 2019-03-25 DIAGNOSIS — I10 ESSENTIAL HYPERTENSION: ICD-10-CM

## 2019-03-25 LAB
ERYTHROCYTE [DISTWIDTH] IN BLOOD BY AUTOMATED COUNT: 16.4 % (ref 10–15)
HBA1C MFR BLD: 6.3 % (ref 0–5.6)
HCT VFR BLD AUTO: 37.5 % (ref 40–53)
HGB BLD-MCNC: 11.3 G/DL (ref 13.3–17.7)
MCH RBC QN AUTO: 27 PG (ref 26.5–33)
MCHC RBC AUTO-ENTMCNC: 30.1 G/DL (ref 31.5–36.5)
MCV RBC AUTO: 90 FL (ref 78–100)
PLATELET # BLD AUTO: 283 10E9/L (ref 150–450)
RBC # BLD AUTO: 4.18 10E12/L (ref 4.4–5.9)
WBC # BLD AUTO: 6.9 10E9/L (ref 4–11)

## 2019-03-25 PROCEDURE — 80048 BASIC METABOLIC PNL TOTAL CA: CPT | Performed by: NURSE PRACTITIONER

## 2019-03-25 PROCEDURE — 99214 OFFICE O/P EST MOD 30 MIN: CPT | Performed by: NURSE PRACTITIONER

## 2019-03-25 PROCEDURE — 83036 HEMOGLOBIN GLYCOSYLATED A1C: CPT | Performed by: NURSE PRACTITIONER

## 2019-03-25 PROCEDURE — 36415 COLL VENOUS BLD VENIPUNCTURE: CPT | Performed by: NURSE PRACTITIONER

## 2019-03-25 PROCEDURE — 85027 COMPLETE CBC AUTOMATED: CPT | Performed by: NURSE PRACTITIONER

## 2019-03-25 RX ORDER — LABETALOL 200 MG/1
200 TABLET, FILM COATED ORAL 2 TIMES DAILY
Qty: 180 TABLET | Refills: 3 | Status: SHIPPED | OUTPATIENT
Start: 2019-03-25 | End: 2020-03-18

## 2019-03-25 RX ORDER — AMLODIPINE BESYLATE 5 MG/1
5 TABLET ORAL DAILY
Qty: 90 TABLET | Refills: 3 | Status: SHIPPED | OUTPATIENT
Start: 2019-03-25 | End: 2020-03-18

## 2019-03-25 RX ORDER — LISINOPRIL 10 MG/1
30 TABLET ORAL DAILY
Qty: 90 TABLET | Refills: 3 | Status: SHIPPED | OUTPATIENT
Start: 2019-03-25 | End: 2019-05-15

## 2019-03-25 RX ORDER — ATORVASTATIN CALCIUM 10 MG/1
10 TABLET, FILM COATED ORAL DAILY
Qty: 90 TABLET | Refills: 3 | Status: SHIPPED | OUTPATIENT
Start: 2019-03-25 | End: 2019-05-09

## 2019-03-25 RX ORDER — TORSEMIDE 20 MG/1
20 TABLET ORAL DAILY
Qty: 90 TABLET | Refills: 3 | Status: SHIPPED | OUTPATIENT
Start: 2019-03-25 | End: 2020-02-19

## 2019-03-25 ASSESSMENT — MIFFLIN-ST. JEOR: SCORE: 1911.54

## 2019-03-25 NOTE — PROGRESS NOTES
SUBJECTIVE:   Lance Ibrahim is a 73 year old male who presents to clinic today for the following health issues:  Patient here for medication check.    Diabetes Follow-up    Patient is checking blood sugars: once daily.  Results are as follows:         am - 113    Diabetic concerns: None     Symptoms of hypoglycemia (low blood sugar): none     Paresthesias (numbness or burning in feet) or sores: Yes achilles tendon hurts, heels also in pain.     Date of last diabetic eye exam: 03/19/2019    Diabetes Management Resources    Hyperlipidemia Follow-Up      Rate your low fat/cholesterol diet?: not monitoring fat    Taking statin?  Yes, no muscle aches from statin    Other lipid medications/supplements?:  none    Hypertension Follow-up      Outpatient blood pressures are being checked at home.  Results are 111/58.    Low Salt Diet: not monitoring salt    BP Readings from Last 2 Encounters:   03/25/19 117/62   03/21/19 118/58     Hemoglobin A1C (%)   Date Value   10/17/2018 6.4 (H)   05/01/2018 6.7 (H)     LDL Cholesterol Calculated (mg/dL)   Date Value   10/07/2017 72   05/15/2017 86       Amount of exercise or physical activity: None    Problems taking medications regularly: No    Medication side effects: none    Diet: regular (no restrictions)            Problem list and histories reviewed & adjusted, as indicated.  Additional history: as documented    Patient Active Problem List   Diagnosis     Ventral hernia     Chronic rhinitis     Hypertrophy of prostate with urinary obstruction     Transient cerebral ischemia     Essential hypertension     CARDIOVASCULAR SCREENING; LDL GOAL LESS THAN 100     Gout     Type 2 diabetes, HbA1c goal < 7% (H)     Obesity     Diabetes mellitus type 2, uncomplicated (H)     Benign essential hypertension     Cervicalgia     Sepsis (H)     Cellulitis     Diabetic foot infection (H)     Amputated toe, left (H)     Type 2 diabetes mellitus with peripheral vascular disease (H)     Total knee  replacement status     Acute chest pain     Dyspnea     Dyspnea on exertion     Chest pain, unspecified type     Cardiomyopathy, unspecified type (H)     Shortness of breath     Abnormal stress test     Renal colic     Past Surgical History:   Procedure Laterality Date     AMPUTATE TOE(S) Left 10/15/2018    Procedure: AMPUTATE TOE(S);  Left third toe amputation;  Surgeon: Ayaka Azevedo DPM, Podiatry/Foot and Ankle Surgery;  Location: [facility] OR     ARTHROPLASTY KNEE Right 2018    Procedure: Right total knee arthroplasty;  Surgeon: Issa Cunha MD;  Location: [facility] OR     C NONSPECIFIC PROCEDURE      Diastasis recti repair     C NONSPECIFIC PROCEDURE      Ventral hernia repair     C NONSPECIFIC PROCEDURE      ORIF of left shoulder     COLONOSCOPY  3/9/2013    Procedure: COLONOSCOPY;  COLONOSCOPY;  Surgeon: Chau Hogan MD;  Location:  GI     CV HEART CATHETERIZATION WITH POSSIBLE INTERVENTION Left 3/5/2019    Procedure: Coronary Angiogram;  Surgeon: Nas Linda MD;  Location:  HEART CARDIAC CATH LAB     EYE SURGERY  2017    left eye cataract     FOOT SURGERY  2013    cyst removal      HERNIA REPAIR  2004    ventral        Social History     Tobacco Use     Smoking status: Former Smoker     Last attempt to quit: 3/17/1993     Years since quittin.0     Smokeless tobacco: Never Used   Substance Use Topics     Alcohol use: Yes     Comment: Occ, Once a month     Family History   Problem Relation Age of Onset     Family History Negative Mother          88 yo     Cancer Father          76 yo brain     Diabetes Maternal Grandfather          89 yo     Alcohol/Drug Paternal Grandfather                  Current Outpatient Medications   Medication Sig Dispense Refill     acetaminophen (TYLENOL) 500 MG tablet Take 500-1,000 mg by mouth every 6 hours as needed for mild pain       amLODIPine (NORVASC) 5 MG tablet Take 1 tablet (5 mg) by mouth daily 90  tablet 3     aspirin (ASA) 81 MG EC tablet Take 1 tablet (81 mg) by mouth daily 100 tablet 3     atorvastatin (LIPITOR) 10 MG tablet Take 1 tablet (10 mg) by mouth daily 90 tablet 3     blood glucose (ACCU-CHEK SMARTVIEW) test strip Use to test blood sugar 1 times daily or as directed. 100 strip 3     Ferrous Gluconate 240 (27 Fe) MG TABS Take 1 tablet by mouth daily        finasteride (PROSCAR) 5 MG tablet Take 5 mg by mouth daily.       fluticasone (FLONASE) 50 MCG/ACT spray Spray 1 spray into both nostrils At Bedtime        isosorbide mononitrate (IMDUR) 30 MG 24 hr tablet Take 1 tablet (30 mg) by mouth daily 30 tablet 11     labetalol (NORMODYNE) 200 MG tablet Take 1 tablet (200 mg) by mouth 2 times daily 180 tablet 3     lisinopril (PRINIVIL/ZESTRIL) 10 MG tablet Take 3 tablets (30 mg) by mouth daily 90 tablet 3     loratadine (CLARITIN) 10 MG tablet Take 10 mg by mouth At Bedtime        metFORMIN (GLUCOPHAGE) 1000 MG tablet TAKE 1 TABLET (1,000 MG) BY MOUTH 2 TIMES DAILY (WITH MEALS) **DUE FOR APRIL APPT/LABS. CALL MD** 180 tablet 1     Multiple Vitamins-Minerals (MULTIVITAMIN ADULTS PO) Take 1 tablet by mouth daily        tamsulosin (FLOMAX) 0.4 MG 24 hr capsule Take 1 capsule by mouth daily.       torsemide (DEMADEX) 20 MG tablet Take 1 tablet (20 mg) by mouth daily 90 tablet 3     Recent Labs   Lab Test 03/18/19  1444 03/11/19  1542  10/17/18  0709  05/01/18  1951 05/01/18  1332 10/07/17  1036 05/15/17  1714 10/22/16  2052  03/05/16  0945  11/10/14  1343 08/16/14  0927   A1C  --   --   --  6.4*  --  6.7*  --  6.3* 6.3*  --    < > 6.1*   < >  --  5.6   LDL  --   --   --   --   --   --   --  72 86  --   --  112*   < >  --  73   HDL  --   --   --   --   --   --   --  65 53  --   --  62   < >  --  76   TRIG  --   --   --   --   --   --   --  68 99  --   --  77   < >  --  62   ALT  --   --   --   --   --   --  30  --   --  28  --   --   --   --  27   CR 2.01* 1.46*   < > 1.15   < > 1.06 0.93 0.88 0.88 1.06   < >  0.86   < >  --  0.83   GFRESTIMATED 32* 46*   < > 62   < > 68 80 85 85 69   < > 88   < >  --  >90  Non  GFR Calc     GFRESTBLACK 37* 54*   < > 75   < > 83 >90 >90 >90   GFR Calc   83   < > >90   GFR Calc     < >  --  >90   GFR Calc     POTASSIUM 4.0 3.9   < >  --    < >  --  4.4 4.3 3.9 4.1   < > 4.9   < >  --  4.5   TSH  --   --   --   --   --   --  2.19  --   --   --   --   --   --  3.90  --     < > = values in this interval not displayed.      BP Readings from Last 3 Encounters:   03/25/19 117/62   03/21/19 118/58   03/07/19 108/56    Wt Readings from Last 3 Encounters:   03/25/19 113.4 kg (249 lb 14.4 oz)   03/21/19 113.4 kg (250 lb)   03/07/19 112.4 kg (247 lb 14.4 oz)                    Reviewed and updated as needed this visit by clinical staff  Tobacco  Allergies  Med Hx  Surg Hx  Fam Hx  Soc Hx      Reviewed and updated as needed this visit by Provider         ROS:  Constitutional, HEENT, cardiovascular, pulmonary, gi and gu systems are negative, except as otherwise noted.    OBJECTIVE:     /62 (BP Location: Right arm, Patient Position: Sitting, Cuff Size: Adult Large)   Pulse 93   Temp 98.5  F (36.9  C) (Oral)   Resp 18   Ht 1.829 m (6')   Wt 113.4 kg (249 lb 14.4 oz)   SpO2 94%   BMI 33.89 kg/m    Body mass index is 33.89 kg/m .  GENERAL: healthy, alert and no distress  NECK: no adenopathy, no asymmetry, masses, or scars and thyroid normal to palpation  RESP: lungs clear to auscultation - no rales, rhonchi or wheezes  CV: regular rate and rhythm, normal S1 S2, no S3 or S4, no murmur, click or rub, no peripheral edema and peripheral pulses strong        ASSESSMENT/PLAN:               ICD-10-CM    1. Type 2 diabetes mellitus with peripheral vascular disease (H) E11.51 **A1C FUTURE anytime   2. Essential hypertension I10 amLODIPine (NORVASC) 5 MG tablet     atorvastatin (LIPITOR) 10 MG tablet     labetalol (NORMODYNE) 200 MG  tablet     lisinopril (PRINIVIL/ZESTRIL) 10 MG tablet     torsemide (DEMADEX) 20 MG tablet     **CBC with platelets FUTURE anytime       F/u 6 months    Anh Spivey NP  [facility]

## 2019-03-25 NOTE — NURSING NOTE
/62 (BP Location: Right arm, Patient Position: Sitting, Cuff Size: Adult Large)   Pulse 93   Temp 98.5  F (36.9  C) (Oral)   Resp 18   Ht 1.829 m (6')   Wt 113.4 kg (249 lb 14.4 oz)   SpO2 94%   BMI 33.89 kg/m    Patient here for medication check.

## 2019-03-26 LAB
ANION GAP SERPL CALCULATED.3IONS-SCNC: 9 MMOL/L (ref 3–14)
BUN SERPL-MCNC: 33 MG/DL (ref 7–30)
CALCIUM SERPL-MCNC: 9.5 MG/DL (ref 8.5–10.1)
CHLORIDE SERPL-SCNC: 107 MMOL/L (ref 94–109)
CO2 SERPL-SCNC: 26 MMOL/L (ref 20–32)
CREAT SERPL-MCNC: 1.5 MG/DL (ref 0.66–1.25)
GFR SERPL CREATININE-BSD FRML MDRD: 45 ML/MIN/{1.73_M2}
GLUCOSE SERPL-MCNC: 113 MG/DL (ref 70–99)
POTASSIUM SERPL-SCNC: 4.7 MMOL/L (ref 3.4–5.3)
SODIUM SERPL-SCNC: 142 MMOL/L (ref 133–144)

## 2019-04-16 ENCOUNTER — TRANSFERRED RECORDS (OUTPATIENT)
Dept: HEALTH INFORMATION MANAGEMENT | Facility: CLINIC | Age: 74
End: 2019-04-16

## 2019-05-02 ENCOUNTER — OFFICE VISIT (OUTPATIENT)
Dept: INTERNAL MEDICINE | Facility: CLINIC | Age: 74
End: 2019-05-02
Payer: COMMERCIAL

## 2019-05-02 VITALS
DIASTOLIC BLOOD PRESSURE: 58 MMHG | TEMPERATURE: 98.3 F | WEIGHT: 255 LBS | HEART RATE: 96 BPM | OXYGEN SATURATION: 96 % | SYSTOLIC BLOOD PRESSURE: 116 MMHG | RESPIRATION RATE: 16 BRPM | HEIGHT: 72 IN | BODY MASS INDEX: 34.54 KG/M2

## 2019-05-02 DIAGNOSIS — Z01.818 PREOPERATIVE EXAMINATION: Primary | ICD-10-CM

## 2019-05-02 PROCEDURE — 99214 OFFICE O/P EST MOD 30 MIN: CPT | Performed by: NURSE PRACTITIONER

## 2019-05-02 ASSESSMENT — MIFFLIN-ST. JEOR: SCORE: 1929.67

## 2019-05-02 NOTE — PROGRESS NOTES
[facility]  [address]  [facility] [address]  [phone]  Dept: [phone]    PRE-OP EVALUATION:  Today's date: 2019    Lance Ibrahim (: 1945) presents for pre-operative evaluation assessment as requested by Dr. Errol Rebollar.  He requires evaluation and anesthesia risk assessment prior to undergoing surgery/procedure for treatment of Right eye cataract .    Fax number for surgical facility: [phone]  Primary Physician: Anh Spivey  Type of Anesthesia Anticipated: Local with MAC    Patient has a Health Care Directive or Living Will:  YES     Preop Questions 2019   Who is doing your surgery? dr Errol Rebollar   What are you having done? cataract removal right eye   Date of Surgery/Procedure: 05/10/2019   1.  Do you have a history of Heart attack, stroke, stent, coronary bypass surgery, or other heart surgery? YES  - TIA    2.  Do you ever have any pain or discomfort in your chest? No   3.  Do you have a history of  Heart Failure? No   4.   Are you troubled by shortness of breath when:  walking on a level surface, or up a slight hill, or at night? YES - fatigue   5.  Do you currently have a cold, bronchitis or other respiratory infection? No   6.  Do you have a cough, shortness of breath, or wheezing? No   7.  Do you sometimes get pains in the calves of your legs when you walk? YES -    8. Do you or anyone in your family have previous history of blood clots? YES -    9.  Do you or does anyone in your family have a serious bleeding problem such as prolonged bleeding following surgeries or cuts? No   10. Have you ever had problems with anemia or been told to take iron pills? YES -    11. Have you had any abnormal blood loss such as black, tarry or bloody stools? No   12. Have you ever had a blood transfusion? No   13. Have you or any of your relatives ever had problems with anesthesia? No   14. Do you have sleep apnea, excessive snoring or daytime  drowsiness? YES -    15. Do you have any prosthetic heart valves? No   16. Do you have prosthetic joints? YES - knee         HPI:     HPI related to upcoming procedure: R eye cataract      See problem list for active medical problems.  Problems all longstanding and stable, except as noted/documented.  See ROS for pertinent symptoms related to these conditions.                                                                                                                                                          .    MEDICAL HISTORY:     Patient Active Problem List    Diagnosis Date Noted     Type 2 diabetes, HbA1c goal < 7% (H) 02/22/2013     Priority: High     Essential hypertension 07/06/2008     Priority: High     Transient cerebral ischemia 07/09/2004     Priority: High     Problem list name updated by automated process. Provider to review       Shortness of breath 03/04/2019     Priority: Medium     Added automatically from request for surgery [id number]       Abnormal stress test 03/04/2019     Priority: Medium     Added automatically from request for surgery [id number]       Renal colic 03/04/2019     Priority: Medium     Dyspnea on exertion 02/22/2019     Priority: Medium     Added automatically from request for surgery [id number]       Chest pain, unspecified type 02/22/2019     Priority: Medium     Added automatically from request for surgery [id number]       Cardiomyopathy, unspecified type (H) 02/22/2019     Priority: Medium     Added automatically from request for surgery [id number]       Acute chest pain 02/17/2019     Priority: Medium     Dyspnea 02/17/2019     Priority: Medium     Total knee replacement status 12/07/2018     Priority: Medium     Amputated toe, left (H) 11/08/2018     Priority: Medium     Type 2 diabetes mellitus with peripheral vascular disease (H) 11/08/2018     Priority: Medium     Diabetic foot infection (H) 10/14/2018     Priority: Medium     Cellulitis 06/19/2018     Priority: Medium     Sepsis (H)  05/01/2018     Priority: Medium     Cervicalgia 02/24/2017     Priority: Medium     Benign essential hypertension 02/17/2016     Priority: Medium     Obesity 10/14/2015     Priority: Medium     Diabetes mellitus type 2, uncomplicated (H) 10/14/2015     Priority: Medium     CARDIOVASCULAR SCREENING; LDL GOAL LESS THAN 100 10/31/2010     Priority: Medium     Hypertrophy of prostate with urinary obstruction 07/09/2004     Priority: Medium     Problem list name updated by automated process. Provider to review       Ventral hernia 06/28/2004     Priority: Medium     Problem list name updated by automated process. Provider to review       Gout 07/20/2012     Priority: Low     Chronic rhinitis 07/09/2004     Priority: Low      Past Medical History:   Diagnosis Date     Arthritis     osteoarthritis knees     Cerebral artery occlusion with cerebral infarction (H)     TIA 1993, no residual     Chronic rhinitis      Diabetes mellitus (H)      Dyspnea 02/17/2019     HTN (hypertension)      Hypertrophy of prostate with urinary obstruction and other lower urinary tract symptoms (LUTS)      Sleep apnea     doesn't use cpap     TIA (transient ischaemic attack) 1993     Unspecified transient cerebral ischemia 1993    No definite source found     Past Surgical History:   Procedure Laterality Date     AMPUTATE TOE(S) Left 10/15/2018    Procedure: AMPUTATE TOE(S);  Left third toe amputation;  Surgeon: Ayaka Azevedo DPM, Podiatry/Foot and Ankle Surgery;  Location: [facility] OR     ARTHROPLASTY KNEE Right 12/7/2018    Procedure: Right total knee arthroplasty;  Surgeon: Issa Cunha MD;  Location: [facility] OR     C NONSPECIFIC PROCEDURE  1985    Diastasis recti repair     C NONSPECIFIC PROCEDURE  1987    Ventral hernia repair     C NONSPECIFIC PROCEDURE      ORIF of left shoulder     COLONOSCOPY  3/9/2013    Procedure: COLONOSCOPY;  COLONOSCOPY;  Surgeon: Chau Hogan MD;  Location:  GI     CV HEART CATHETERIZATION WITH POSSIBLE  "INTERVENTION Left 3/5/2019    Procedure: Coronary Angiogram;  Surgeon: Nas Linda MD;  Location:  HEART CARDIAC CATH LAB     EYE SURGERY  03/13/2017    left eye cataract     FOOT SURGERY  4/2013    cyst removal      HERNIA REPAIR  7/13/2004    ventral      Current Outpatient Medications   Medication Sig Dispense Refill     acetaminophen (TYLENOL) 500 MG tablet Take 500-1,000 mg by mouth every 6 hours as needed for mild pain       amLODIPine (NORVASC) 5 MG tablet Take 1 tablet (5 mg) by mouth daily 90 tablet 3     aspirin (ASA) 81 MG EC tablet Take 1 tablet (81 mg) by mouth daily 100 tablet 3     atorvastatin (LIPITOR) 10 MG tablet Take 1 tablet (10 mg) by mouth daily 90 tablet 3     blood glucose (ACCU-CHEK SMARTVIEW) test strip Use to test blood sugar 1 times daily or as directed. 100 strip 3     Ferrous Gluconate 240 (27 Fe) MG TABS Take 1 tablet by mouth daily        finasteride (PROSCAR) 5 MG tablet Take 5 mg by mouth daily.       fluticasone (FLONASE) 50 MCG/ACT spray Spray 1 spray into both nostrils At Bedtime        isosorbide mononitrate (IMDUR) 30 MG 24 hr tablet Take 1 tablet (30 mg) by mouth daily 30 tablet 11     labetalol (NORMODYNE) 200 MG tablet Take 1 tablet (200 mg) by mouth 2 times daily 180 tablet 3     lisinopril (PRINIVIL/ZESTRIL) 10 MG tablet Take 3 tablets (30 mg) by mouth daily 90 tablet 3     loratadine (CLARITIN) 10 MG tablet Take 10 mg by mouth At Bedtime        metFORMIN (GLUCOPHAGE) 1000 MG tablet TAKE 1 TABLET (1,000 MG) BY MOUTH 2 TIMES DAILY (WITH MEALS) **DUE FOR APRIL APPT/LABS. CALL MD** 180 tablet 1     Multiple Vitamins-Minerals (MULTIVITAMIN ADULTS PO) Take 1 tablet by mouth daily        tamsulosin (FLOMAX) 0.4 MG 24 hr capsule Take 1 capsule by mouth daily.       torsemide (DEMADEX) 20 MG tablet Take 1 tablet (20 mg) by mouth daily 90 tablet 3     OTC products: None, except as noted above    Allergies   Allergen Reactions     Penicillins Anaphylaxis     \"anaphylactic\" " "    Sulfa Drugs      \"itchy rash, swelling of face and hands\"     Ancef [Cefazolin] Rash      Latex Allergy: NO    Social History     Tobacco Use     Smoking status: Former Smoker     Last attempt to quit: 3/17/1993     Years since quittin.1     Smokeless tobacco: Never Used   Substance Use Topics     Alcohol use: Yes     Comment: Occ, Once a month     History   Drug Use No       REVIEW OF SYSTEMS:   CONSTITUTIONAL: NEGATIVE for fever, chills, change in weight  ENT/MOUTH: NEGATIVE for ear, mouth and throat problems  RESP: NEGATIVE for significant cough or SOB  CV: NEGATIVE for chest pain, palpitations or peripheral edema  GI: NEGATIVE for nausea, abdominal pain, heartburn, or change in bowel habits  : NEGATIVE for frequency, dysuria, or hematuria  MUSCULOSKELETAL: NEGATIVE for significant arthralgias or myalgia  NEURO: NEGATIVE for weakness, dizziness or paresthesias  ENDOCRINE: NEGATIVE for temperature intolerance, skin/hair changes  HEME: NEGATIVE for bleeding problems    EXAM:   /58 (BP Location: Right arm, Patient Position: Sitting, Cuff Size: Adult Large)   Pulse 96   Temp 98.3  F (36.8  C) (Oral)   Resp 16   Ht 1.829 m (6')   Wt 115.7 kg (255 lb)   SpO2 96%   BMI 34.58 kg/m      GENERAL APPEARANCE: obese     NECK: no adenopathy, no asymmetry, masses, or scars and thyroid normal to palpation     RESP: lungs clear to auscultation - no rales, rhonchi or wheezes     CV: regular rates and rhythm, normal S1 S2, no S3 or S4 and no murmur, click or rub     ABDOMEN:  soft, nontender, no HSM or masses and bowel sounds normal     MS: extremities normal- no gross deformities noted, no evidence of inflammation in joints, FROM in all extremities.     NEURO: Normal strength and tone, sensory exam grossly normal, mentation intact and speech normal     PSYCH: mentation appears normal. and affect normal/bright     LYMPHATICS: No cervical adenopathy    DIAGNOSTICS:   No labs or EKG required for low risk " surgery (cataract, skin procedure, breast biopsy, etc)    Recent Labs   Lab Test 03/25/19  1647 03/18/19  1444  03/04/19  0947  03/01/19  1126  02/26/19  1539  10/17/18  0709   HGB 11.3*  --   --   --   --  10.5*  --  11.1*   < >  --      --   --   --   --  368  --  329   < >  --    INR  --   --   --  1.13  --   --   --  1.04  --   --     138   < >  --    < > 142   < > 139   < >  --    POTASSIUM 4.7 4.0   < >  --    < > 4.8   < > 4.7   < >  --    CR 1.50* 2.01*   < >  --    < > 2.21*   < > 2.08*   < > 1.15   A1C 6.3*  --   --   --   --   --   --   --   --  6.4*    < > = values in this interval not displayed.        IMPRESSION:   Reason for surgery/procedure: R eye cataract    The proposed surgical procedure is considered LOW risk.    REVISED CARDIAC RISK INDEX  The patient has the following serious cardiovascular risks for perioperative complications such as (MI, PE, VFib and 3  AV Block):  Cerebrovascular Disease (TIA or CVA)  INTERPRETATION: 1 risks: Class II (low risk - 0.9% complication rate)    The patient has the following additional risks for perioperative complications:  No identified additional risks    No diagnosis found.    RECOMMENDATIONS:     --Consult hospital rounder / IM to assist post-op medical management    --Patient is to take all scheduled medications on the day of surgery EXCEPT for modifications listed below.    APPROVAL GIVEN to proceed with proposed procedure, without further diagnostic evaluation       Signed Electronically by: Anh Spivey NP    Copy of this evaluation report is provided to requesting physician.    [facility] Guidelines    Revised Cardiac Risk Index

## 2019-05-09 ENCOUNTER — OFFICE VISIT (OUTPATIENT)
Dept: CARDIOLOGY | Facility: CLINIC | Age: 74
End: 2019-05-09
Payer: COMMERCIAL

## 2019-05-09 VITALS
DIASTOLIC BLOOD PRESSURE: 60 MMHG | HEART RATE: 92 BPM | HEIGHT: 72 IN | WEIGHT: 257.2 LBS | BODY MASS INDEX: 34.84 KG/M2 | SYSTOLIC BLOOD PRESSURE: 114 MMHG

## 2019-05-09 DIAGNOSIS — I25.10 CORONARY ARTERY DISEASE INVOLVING NATIVE CORONARY ARTERY OF NATIVE HEART WITHOUT ANGINA PECTORIS: ICD-10-CM

## 2019-05-09 DIAGNOSIS — I42.9 CARDIOMYOPATHY, UNSPECIFIED TYPE (H): ICD-10-CM

## 2019-05-09 DIAGNOSIS — Z79.4 TYPE 2 DIABETES MELLITUS WITH HYPEROSMOLARITY WITHOUT COMA, WITH LONG-TERM CURRENT USE OF INSULIN (H): ICD-10-CM

## 2019-05-09 DIAGNOSIS — I50.32 CHRONIC DIASTOLIC HEART FAILURE (H): ICD-10-CM

## 2019-05-09 DIAGNOSIS — E11.00 TYPE 2 DIABETES MELLITUS WITH HYPEROSMOLARITY WITHOUT COMA, WITH LONG-TERM CURRENT USE OF INSULIN (H): ICD-10-CM

## 2019-05-09 DIAGNOSIS — R06.09 DYSPNEA ON EXERTION: Primary | ICD-10-CM

## 2019-05-09 DIAGNOSIS — I10 ESSENTIAL HYPERTENSION: ICD-10-CM

## 2019-05-09 PROCEDURE — 99214 OFFICE O/P EST MOD 30 MIN: CPT | Performed by: INTERNAL MEDICINE

## 2019-05-09 RX ORDER — KETOROLAC TROMETHAMINE 5 MG/ML
SOLUTION OPHTHALMIC
Refills: 1 | COMMUNITY
Start: 2019-04-30 | End: 2019-07-19

## 2019-05-09 RX ORDER — PREDNISOLONE ACETATE 10 MG/ML
SUSPENSION/ DROPS OPHTHALMIC
Refills: 3 | COMMUNITY
Start: 2019-04-30 | End: 2019-07-19

## 2019-05-09 RX ORDER — GATIFLOXACIN 5 MG/ML
SOLUTION/ DROPS OPHTHALMIC
Refills: 0 | COMMUNITY
Start: 2019-04-30 | End: 2019-07-19

## 2019-05-09 RX ORDER — ATORVASTATIN CALCIUM 40 MG/1
40 TABLET, FILM COATED ORAL DAILY
Qty: 90 TABLET | Refills: 3 | Status: SHIPPED | OUTPATIENT
Start: 2019-05-09 | End: 2020-02-20

## 2019-05-09 ASSESSMENT — MIFFLIN-ST. JEOR: SCORE: 1939.65

## 2019-05-09 NOTE — LETTER
5/9/2019      Anh Spivey NP  [address] [facility] [address]      RE: Lance Ibrahim       Dear Colleague,    I had the pleasure of seeing Lance Ibrahim in the [facility].    Service Date: 05/09/2019      HISTORY OF PRESENT ILLNESS:  It was my pleasure to see your patient, Lance Ibrahim, in followup.  This is a 74-year-old patient who presented with dyspnea on exertion.  It was felt initially that this was possibly coronary artery disease and so the patient was sent for coronary angiography.  Coronary angiography, however, revealed that the patient had relatively mild coronary artery disease.  The left main coronary artery was felt to be no more than 30%-40% disease.  The left anterior descending artery had 30%-35% disease.  At the very end of the LAD at the apex, there is a subtotal occlusion of the apical LAD.  That would not account for shortness of breath or chest discomfort as the vessel is so small there.  There was 30%-35% diffuse disease in the left circumflex.  The ramus had 35% disease.  The right coronary artery had 30%-40% diffuse disease.        What the most striking feature was that the pressures were significantly raised in the heart.  They were described as being very elevated into the 30s.  His blood pressure was markedly raised also in the cardiac catheterization laboratory.  In fact, it took quite some time to get the blood pressure out of the 200s.  He was discharged on a significant number of antihypertensive medications.        He is now on:   1.  Labetalol 200 mg twice a day.   2.  Lisinopril 30 mg per day.   3.  Isosorbide mononitrate 30 mg per day.   4.  Amlodipine 5 mg per day.        This has been successful at controlling his blood pressure, which is now 114/60.  He was started on torsemide.  Initially he was on 40 mg, but is now down to 20 mg.  He is not short of breath.  His ankle edema has markedly improved.  However, he does state that he  has been going to the bathroom a lot.  He takes the torsemide in the morning, but even at 1:00 a.m. and 3 a.m. in the morning, he is getting up to pass urine every 2 hours.  That is highly unusual for morning torsemide and one wonders if he has a prostate issue and is not fully emptying his bladder.  I have run into this situation before where the patient's think that they are still diuresing nearly 18 hours after taking the diuretic whereas the effect of the drug has a long passed at that stage.  He is due to see his urologist soon and I think that question should be posed to the urologist whether he is having bladder issues.        His last lipid profile that I can see was in 2017, where his LDL was 72, HDL 65 and triglycerides are 68, and while that is a good cholesterol, he should be on higher dose atorvastatin.  He is on low-dose atorvastatin at 10 mg. In patients with established coronary artery disease, it is recommended that the would at least be on 40 mg of atorvastatin and especially given the fact that he has widespread diffuse coronary artery disease, I would feel more comfortable if he was on the higher dose.      IMPRESSION:   1.  Mainly diastolic congestive heart failure with severely raised left ventricular end-diastolic pressures.  The patient's symptoms have markedly improved with diuresis and improving blood pressure control.   2.  Borderline reduced left ventricular systolic function with an EF of 45%-50% on echocardiography on 10/18.   3.  Urinary frequency with diuretics.  The patient is having nocturia.  This is highly unusual after taking morning diuretics.  One wonders if he has a prostate or bladder issue causing that.   4.  Essential hypertension.  His blood pressure is much better controlled now.   5.  Moderate obesity with a BMI of 35.   6.  Mild renal insufficiency.   7.  Type 2 diabetes mellitus.   8.  On low-dose atorvastatin where high-dose atorvastatin is recommended given that he  has a formal diagnosis of coronary artery disease now.      PLAN:   1.  I will increase the atorvastatin to 40 mg per day and we will check lipids in 6 weeks' time with liver function tests.   2.  I will have the patient followup with Maria Fernanda Patiño in 4 months' time, and at that stage I will repeat an echocardiogram to see if his ejection fraction has improved with diuresis and better blood pressure control.  (I did fail to mention the echo order to the patient before he left.)   3.  I will have the patient follow up to see me in about 9 months' time.  At that stage, I will recheck lipids and basic metabolic profile.     4.  Finally, the patient should discuss with his urologist whether he is having bladder issues given that it is highly unusual for a patient to be having nocturia having taken torsemide in the morning time.      It has been my pleasure to be involved in the care of this very nice patient.        cc:   Anh Spivey NP   [facility]   [address] [place] [address]         BRANDEN FUENTES MD, [facility]             D: 2019   T: 2019   MT: EVETTE      Name:     CURTIS SAHU   MRN:      [id number]        Account:      [id number]   :      1945           Service Date: 2019      Document: [id number]           Outpatient Encounter Medications as of 2019   Medication Sig Dispense Refill     acetaminophen (TYLENOL) 500 MG tablet Take 500-1,000 mg by mouth every 6 hours as needed for mild pain       amLODIPine (NORVASC) 5 MG tablet Take 1 tablet (5 mg) by mouth daily 90 tablet 3     aspirin (ASA) 81 MG EC tablet Take 1 tablet (81 mg) by mouth daily 100 tablet 3     atorvastatin (LIPITOR) 40 MG tablet Take 1 tablet (40 mg) by mouth daily 90 tablet 3     blood glucose (ACCU-CHEK SMARTVIEW) test strip Use to test blood sugar 1 times daily or as directed. 100 strip 3     Ferrous Gluconate 240 (27 Fe) MG TABS Take 1 tablet by mouth daily        finasteride  (PROSCAR) 5 MG tablet Take 5 mg by mouth daily.       fluticasone (FLONASE) 50 MCG/ACT spray Spray 1 spray into both nostrils At Bedtime        gatifloxacin (ZYMAXID) 0.5 % ophthalmic solution BEGIN 3DAYS BEFORE SURGERY: INSTILL 1DROP 4 X DAILY X 1WK,THEN 1DROP 2 X DAILY UNTIL GONE.  0     isosorbide mononitrate (IMDUR) 30 MG 24 hr tablet Take 1 tablet (30 mg) by mouth daily 30 tablet 11     ketorolac (ACULAR) 0.5 % ophthalmic solution BEGIN 3DAYS BEFORE SURGERY: INSTILL 1DROP IN OPERATIVE EYE 2X/DAY X 1WK,THEN 1DROP ONCE DAILY X 7WKS  1     labetalol (NORMODYNE) 200 MG tablet Take 1 tablet (200 mg) by mouth 2 times daily 180 tablet 3     lisinopril (PRINIVIL/ZESTRIL) 10 MG tablet Take 3 tablets (30 mg) by mouth daily 90 tablet 3     loratadine (CLARITIN) 10 MG tablet Take 10 mg by mouth At Bedtime        metFORMIN (GLUCOPHAGE) 1000 MG tablet TAKE 1 TABLET (1,000 MG) BY MOUTH 2 TIMES DAILY (WITH MEALS) **DUE FOR APRIL APPT/LABS. CALL MD** 180 tablet 1     Multiple Vitamins-Minerals (MULTIVITAMIN ADULTS PO) Take 1 tablet by mouth daily        prednisoLONE acetate (PRED FORTE) 1 % ophthalmic suspension BEGIN 3DAYS BEFORE SURGERY:INSTILL 1DROP TO OPERATIVE EYE 4X/DAY X 1WK,THEN 1DROP 2X/DAY X 7WKS  3     tamsulosin (FLOMAX) 0.4 MG 24 hr capsule Take 1 capsule by mouth daily.       torsemide (DEMADEX) 20 MG tablet Take 1 tablet (20 mg) by mouth daily 90 tablet 3     [DISCONTINUED] atorvastatin (LIPITOR) 10 MG tablet Take 1 tablet (10 mg) by mouth daily 90 tablet 3     No facility-administered encounter medications on file as of 5/9/2019.        Again, thank you for allowing me to participate in the care of your patient.      Sincerely,    Lars Martines MD, MD     [facility]

## 2019-05-09 NOTE — PROGRESS NOTES
HPI and Plan:   See dictation    Orders Placed This Encounter   Procedures     Lipid Profile     ALT     Basic metabolic panel     Lipid Profile     ALT     Basic metabolic panel     Follow-Up with Cardiac Advanced Practice Provider     Follow-Up with Cardiologist       Orders Placed This Encounter   Medications     gatifloxacin (ZYMAXID) 0.5 % ophthalmic solution     Sig: BEGIN 3DAYS BEFORE SURGERY: INSTILL 1DROP 4 X DAILY X 1WK,THEN 1DROP 2 X DAILY UNTIL GONE.     Refill:  0     ketorolac (ACULAR) 0.5 % ophthalmic solution     Sig: BEGIN 3DAYS BEFORE SURGERY: INSTILL 1DROP IN OPERATIVE EYE 2X/DAY X 1WK,THEN 1DROP ONCE DAILY X 7WKS     Refill:  1     prednisoLONE acetate (PRED FORTE) 1 % ophthalmic suspension     Sig: BEGIN 3DAYS BEFORE SURGERY:INSTILL 1DROP TO OPERATIVE EYE 4X/DAY X 1WK,THEN 1DROP 2X/DAY X 7WKS     Refill:  3     atorvastatin (LIPITOR) 40 MG tablet     Sig: Take 1 tablet (40 mg) by mouth daily     Dispense:  90 tablet     Refill:  3       Medications Discontinued During This Encounter   Medication Reason     atorvastatin (LIPITOR) 10 MG tablet Reorder         Encounter Diagnoses   Name Primary?     Dyspnea on exertion Yes     Cardiomyopathy, unspecified type (H)      Essential hypertension      Type 2 diabetes mellitus with hyperosmolarity without coma, with long-term current use of insulin (H)      Chronic diastolic heart failure (H)      Coronary artery disease involving native coronary artery of native heart without angina pectoris        CURRENT MEDICATIONS:  Current Outpatient Medications   Medication Sig Dispense Refill     acetaminophen (TYLENOL) 500 MG tablet Take 500-1,000 mg by mouth every 6 hours as needed for mild pain       amLODIPine (NORVASC) 5 MG tablet Take 1 tablet (5 mg) by mouth daily 90 tablet 3     aspirin (ASA) 81 MG EC tablet Take 1 tablet (81 mg) by mouth daily 100 tablet 3     atorvastatin (LIPITOR) 40 MG tablet Take 1 tablet (40 mg) by mouth daily 90 tablet 3     blood  "glucose (ACCU-CHEK SMARTVIEW) test strip Use to test blood sugar 1 times daily or as directed. 100 strip 3     Ferrous Gluconate 240 (27 Fe) MG TABS Take 1 tablet by mouth daily        finasteride (PROSCAR) 5 MG tablet Take 5 mg by mouth daily.       fluticasone (FLONASE) 50 MCG/ACT spray Spray 1 spray into both nostrils At Bedtime        gatifloxacin (ZYMAXID) 0.5 % ophthalmic solution BEGIN 3DAYS BEFORE SURGERY: INSTILL 1DROP 4 X DAILY X 1WK,THEN 1DROP 2 X DAILY UNTIL GONE.  0     isosorbide mononitrate (IMDUR) 30 MG 24 hr tablet Take 1 tablet (30 mg) by mouth daily 30 tablet 11     ketorolac (ACULAR) 0.5 % ophthalmic solution BEGIN 3DAYS BEFORE SURGERY: INSTILL 1DROP IN OPERATIVE EYE 2X/DAY X 1WK,THEN 1DROP ONCE DAILY X 7WKS  1     labetalol (NORMODYNE) 200 MG tablet Take 1 tablet (200 mg) by mouth 2 times daily 180 tablet 3     lisinopril (PRINIVIL/ZESTRIL) 10 MG tablet Take 3 tablets (30 mg) by mouth daily 90 tablet 3     loratadine (CLARITIN) 10 MG tablet Take 10 mg by mouth At Bedtime        metFORMIN (GLUCOPHAGE) 1000 MG tablet TAKE 1 TABLET (1,000 MG) BY MOUTH 2 TIMES DAILY (WITH MEALS) **DUE FOR APRIL APPT/LABS. CALL MD** 180 tablet 1     Multiple Vitamins-Minerals (MULTIVITAMIN ADULTS PO) Take 1 tablet by mouth daily        prednisoLONE acetate (PRED FORTE) 1 % ophthalmic suspension BEGIN 3DAYS BEFORE SURGERY:INSTILL 1DROP TO OPERATIVE EYE 4X/DAY X 1WK,THEN 1DROP 2X/DAY X 7WKS  3     tamsulosin (FLOMAX) 0.4 MG 24 hr capsule Take 1 capsule by mouth daily.       torsemide (DEMADEX) 20 MG tablet Take 1 tablet (20 mg) by mouth daily 90 tablet 3       ALLERGIES     Allergies   Allergen Reactions     Penicillins Anaphylaxis     \"anaphylactic\"     Sulfa Drugs      \"itchy rash, swelling of face and hands\"     Ancef [Cefazolin] Rash       PAST MEDICAL HISTORY:  Past Medical History:   Diagnosis Date     Abnormal stress test 3/4/2019    Added automatically from request for surgery [id number]     Arthritis     " osteoarthritis knees     CAD (coronary artery disease)     subtotal occlusion in the small distal LAD      Cardiomyopathy (H)      Cerebral artery occlusion with cerebral infarction (H)     TIA 1993, no residual     Chest pain      Chronic rhinitis      ISBELL (dyspnea on exertion)      Dyspnea 02/17/2019     Edema      HTN (hypertension)      Hyperlipidemia      Hypertrophy of prostate with urinary obstruction and other lower urinary tract symptoms (LUTS)      Nephrolithiasis      Orthopnea      PVD (peripheral vascular disease) (H)      Renal colic      S/P cardiac cath 03/05/2019    subtotal occlusion in the small distal LAD      Sleep apnea     doesn't use cpap     TIA (transient ischaemic attack) 1993     Type 2 diabetes mellitus with peripheral vascular disease (H) 11/8/2018     Unspecified transient cerebral ischemia 1993    No definite source found     Ureteral stone        PAST SURGICAL HISTORY:  Past Surgical History:   Procedure Laterality Date     AMPUTATE TOE(S) Left 10/15/2018    Procedure: AMPUTATE TOE(S);  Left third toe amputation;  Surgeon: Ayaka Azevedo DPM, Podiatry/Foot and Ankle Surgery;  Location: [facility] OR     ARTHROPLASTY KNEE Right 12/7/2018    Procedure: Right total knee arthroplasty;  Surgeon: Issa Cunha MD;  Location: [facility] OR     C NONSPECIFIC PROCEDURE  1985    Diastasis recti repair     C NONSPECIFIC PROCEDURE  1987    Ventral hernia repair     C NONSPECIFIC PROCEDURE      ORIF of left shoulder     COLONOSCOPY  3/9/2013    Procedure: COLONOSCOPY;  COLONOSCOPY;  Surgeon: Chau Hogan MD;  Location:  GI     CV HEART CATHETERIZATION WITH POSSIBLE INTERVENTION Left 3/5/2019    Procedure: Coronary Angiogram;  Surgeon: Nas Linda MD;  Location:  HEART CARDIAC CATH LAB     EYE SURGERY  03/13/2017    left eye cataract     FOOT SURGERY  4/2013    cyst removal      HERNIA REPAIR  7/13/2004    ventral        FAMILY HISTORY:  Family History   Problem Relation Age of Onset      Family History Negative Mother          88 yo     Cancer Father          74 yo brain     Diabetes Maternal Grandfather          91 yo     Alcohol/Drug Paternal Grandfather                SOCIAL HISTORY:  Social History     Socioeconomic History     Marital status:      Spouse name: None     Number of children: None     Years of education: None     Highest education level: None   Occupational History     Employer: SELF   Social Needs     Financial resource strain: None     Food insecurity:     Worry: None     Inability: None     Transportation needs:     Medical: None     Non-medical: None   Tobacco Use     Smoking status: Former Smoker     Last attempt to quit: 3/17/1993     Years since quittin.1     Smokeless tobacco: Never Used   Substance and Sexual Activity     Alcohol use: Yes     Comment: Occ, Once a month     Drug use: No     Sexual activity: Never   Lifestyle     Physical activity:     Days per week: None     Minutes per session: None     Stress: None   Relationships     Social connections:     Talks on phone: None     Gets together: None     Attends Confucianist service: None     Active member of club or organization: None     Attends meetings of clubs or organizations: None     Relationship status: None     Intimate partner violence:     Fear of current or ex partner: None     Emotionally abused: None     Physically abused: None     Forced sexual activity: None   Other Topics Concern     Parent/sibling w/ CABG, MI or angioplasty before 65F 55M? Not Asked   Social History Narrative     None       Review of Systems:  Skin:  Negative       Eyes:  Positive for glasses;cataracts    ENT:  Positive for hearing loss;sinus trouble;postnasal drainage    Respiratory:  Positive for sleep apnea;CPAP     Cardiovascular:  Negative      Gastroenterology: Negative      Genitourinary:  Positive for nocturia    Musculoskeletal:  Positive for back pain;joint pain    Neurologic:   Positive for headaches    Psychiatric:  Positive for sleep disturbances    Heme/Lymph/Imm:  Positive for allergies    Endocrine:  Positive for diabetes      Physical Exam:  Vitals: /60 (BP Location: Right arm, Patient Position: Sitting, Cuff Size: Adult Large)   Pulse 92   Ht 1.829 m (6')   Wt 116.7 kg (257 lb 3.2 oz)   BMI 34.88 kg/m      Constitutional:  cooperative, alert and oriented, well developed, well nourished, in no acute distress obese      Skin:  warm and dry to the touch          Head:  normocephalic        Eyes:  pupils equal and round        Lymph:No Cervical lymphadenopathy present     ENT:  no pallor or cyanosis        Neck:  carotid pulses are full and equal bilaterally, JVP normal, no carotid bruit        Respiratory:  normal breath sounds, clear to auscultation, normal A-P diameter, normal symmetry, normal respiratory excursion, no use of accessory muscles         Cardiac: regular rhythm;normal S1 and S2;no murmurs, gallops or rubs detected   distant heart sounds            pulses full and equal                                        GI:    obese      Extremities and Muscular Skeletal:  no deformities, clubbing, cyanosis, erythema observed   bilateral LE edema;pitting;1+          Neurological:  no gross motor deficits        Psych:  Alert and Oriented x 3;affect appropriate, oriented to time, person and place        CC  Lars Martines MD  [address]  [place] [id number]

## 2019-05-09 NOTE — PROGRESS NOTES
Service Date: 05/09/2019      HISTORY OF PRESENT ILLNESS:  It was my pleasure to see your patient, Lance Ibrahim, in followup.  This is a 74-year-old patient who presented with dyspnea on exertion.  It was felt initially that this was possibly coronary artery disease and so the patient was sent for coronary angiography.  Coronary angiography, however, revealed that the patient had relatively mild coronary artery disease.  The left main coronary artery was felt to be no more than 30%-40% disease.  The left anterior descending artery had 30%-35% disease.  At the very end of the LAD at the apex, there is a subtotal occlusion of the apical LAD.  That would not account for shortness of breath or chest discomfort as the vessel is so small there.  There was 30%-35% diffuse disease in the left circumflex.  The ramus had 35% disease.  The right coronary artery had 30%-40% diffuse disease.        What the most striking feature was that the pressures were significantly raised in the heart.  They were described as being very elevated into the 30s.  His blood pressure was markedly raised also in the cardiac catheterization laboratory.  In fact, it took quite some time to get the blood pressure out of the 200s.  He was discharged on a significant number of antihypertensive medications.        He is now on:   1.  Labetalol 200 mg twice a day.   2.  Lisinopril 30 mg per day.   3.  Isosorbide mononitrate 30 mg per day.   4.  Amlodipine 5 mg per day.        This has been successful at controlling his blood pressure, which is now 114/60.  He was started on torsemide.  Initially he was on 40 mg, but is now down to 20 mg.  He is not short of breath.  His ankle edema has markedly improved.  However, he does state that he has been going to the bathroom a lot.  He takes the torsemide in the morning, but even at 1:00 a.m. and 3 a.m. in the morning, he is getting up to pass urine every 2 hours.  That is highly unusual for morning torsemide and  one wonders if he has a prostate issue and is not fully emptying his bladder.  I have run into this situation before where the patient's think that they are still diuresing nearly 18 hours after taking the diuretic whereas the effect of the drug has a long passed at that stage.  He is due to see his urologist soon and I think that question should be posed to the urologist whether he is having bladder issues.        His last lipid profile that I can see was in 2017, where his LDL was 72, HDL 65 and triglycerides are 68, and while that is a good cholesterol, he should be on higher dose atorvastatin.  He is on low-dose atorvastatin at 10 mg. In patients with established coronary artery disease, it is recommended that the would at least be on 40 mg of atorvastatin and especially given the fact that he has widespread diffuse coronary artery disease, I would feel more comfortable if he was on the higher dose.      IMPRESSION:   1.  Mainly diastolic congestive heart failure with severely raised left ventricular end-diastolic pressures.  The patient's symptoms have markedly improved with diuresis and improving blood pressure control.   2.  Borderline reduced left ventricular systolic function with an EF of 45%-50% on echocardiography on 10/18.   3.  Urinary frequency with diuretics.  The patient is having nocturia.  This is highly unusual after taking morning diuretics.  One wonders if he has a prostate or bladder issue causing that.   4.  Essential hypertension.  His blood pressure is much better controlled now.   5.  Moderate obesity with a BMI of 35.   6.  Mild renal insufficiency.   7.  Type 2 diabetes mellitus.   8.  On low-dose atorvastatin where high-dose atorvastatin is recommended given that he has a formal diagnosis of coronary artery disease now.      PLAN:   1.  I will increase the atorvastatin to 40 mg per day and we will check lipids in 6 weeks' time with liver function tests.   2.  I will have the patient  followup with Maria Fernanda Patiño in 4 months' time, and at that stage I will repeat an echocardiogram to see if his ejection fraction has improved with diuresis and better blood pressure control.  (I did fail to mention the echo order to the patient before he left.)   3.  I will have the patient follow up to see me in about 9 months' time.  At that stage, I will recheck lipids and basic metabolic profile.     4.  Finally, the patient should discuss with his urologist whether he is having bladder issues given that it is highly unusual for a patient to be having nocturia having taken torsemide in the morning time.      It has been my pleasure to be involved in the care of this very nice patient.        cc:   Anh Spivey NP   [facility]   [address] [place] [address]         BRANDEN FUENTES MD, [facility]             D: 2019   T: 2019   MT: EVETTE      Name:     CURTIS SAHU   MRN:      [id number]        Account:      [id number]   :      1945           Service Date: 2019      Document: [id number]

## 2019-05-15 DIAGNOSIS — I10 ESSENTIAL HYPERTENSION: ICD-10-CM

## 2019-05-15 NOTE — TELEPHONE ENCOUNTER
"Requested Prescriptions   Pending Prescriptions Disp Refills     lisinopril (PRINIVIL/ZESTRIL) 10 MG tablet [Pharmacy Med Name: LISINOPRIL 10 MG TABLET] 90 tablet 3     Sig: TAKE 3 TABLETS (30 MG) BY MOUTH DAILY   Last Written Prescription Date:  03/25/2019  Last Fill Quantity: 90,  # refills: 3   Last office visit: 5/2/2019 with prescribing provider:     Future Office Visit:      ACE Inhibitors (Including Combos) Protocol Failed - 5/15/2019  1:41 AM        Failed - Normal serum creatinine on file in past 12 months     Recent Labs   Lab Test 03/25/19  1647   CR 1.50*             Passed - Blood pressure under 140/90 in past 12 months     BP Readings from Last 3 Encounters:   05/09/19 114/60   05/02/19 116/58   03/25/19 117/62                 Passed - Recent (12 mo) or future (30 days) visit within the authorizing provider's specialty     Patient had office visit in the last 12 months or has a visit in the next 30 days with authorizing provider or within the authorizing provider's specialty.  See \"Patient Info\" tab in inbasket, or \"Choose Columns\" in Meds & Orders section of the refill encounter.              Passed - Medication is active on med list        Passed - Patient is age 18 or older        Passed - Normal serum potassium on file in past 12 months     Recent Labs   Lab Test 03/25/19  1647   POTASSIUM 4.7             "

## 2019-05-16 RX ORDER — LISINOPRIL 10 MG/1
30 TABLET ORAL DAILY
Qty: 90 TABLET | Refills: 3 | Status: SHIPPED | OUTPATIENT
Start: 2019-05-16 | End: 2019-10-29

## 2019-06-22 ENCOUNTER — TRANSFERRED RECORDS (OUTPATIENT)
Dept: HEALTH INFORMATION MANAGEMENT | Facility: CLINIC | Age: 74
End: 2019-06-22

## 2019-06-25 DIAGNOSIS — I25.10 CORONARY ARTERY DISEASE INVOLVING NATIVE CORONARY ARTERY OF NATIVE HEART WITHOUT ANGINA PECTORIS: ICD-10-CM

## 2019-06-25 DIAGNOSIS — I10 ESSENTIAL HYPERTENSION: ICD-10-CM

## 2019-06-25 DIAGNOSIS — I50.32 CHRONIC DIASTOLIC HEART FAILURE (H): ICD-10-CM

## 2019-06-25 LAB
ALT SERPL W P-5'-P-CCNC: 24 U/L (ref 0–70)
CHOLEST SERPL-MCNC: 119 MG/DL
HDLC SERPL-MCNC: 63 MG/DL
LDLC SERPL CALC-MCNC: 43 MG/DL
NONHDLC SERPL-MCNC: 56 MG/DL
TRIGL SERPL-MCNC: 65 MG/DL

## 2019-06-25 PROCEDURE — 80061 LIPID PANEL: CPT | Performed by: INTERNAL MEDICINE

## 2019-06-25 PROCEDURE — 80048 BASIC METABOLIC PNL TOTAL CA: CPT | Performed by: INTERNAL MEDICINE

## 2019-06-25 PROCEDURE — 36415 COLL VENOUS BLD VENIPUNCTURE: CPT | Performed by: INTERNAL MEDICINE

## 2019-06-25 PROCEDURE — 84460 ALANINE AMINO (ALT) (SGPT): CPT | Performed by: INTERNAL MEDICINE

## 2019-06-28 ENCOUNTER — DOCUMENTATION ONLY (OUTPATIENT)
Dept: CARDIOLOGY | Facility: CLINIC | Age: 74
End: 2019-06-28

## 2019-06-28 ENCOUNTER — TELEPHONE (OUTPATIENT)
Dept: CARDIOLOGY | Facility: CLINIC | Age: 74
End: 2019-06-28

## 2019-06-28 DIAGNOSIS — I42.9 CARDIOMYOPATHY, UNSPECIFIED TYPE (H): Primary | ICD-10-CM

## 2019-06-28 NOTE — LETTER
August 9, 2019     TO: Lance MORALES Patrice   [address]   [place] [address]     Dear Mr. Ibrahim,  The results of your recent Laboratory tests.  Results for orders placed or performed in visit on 06/25/19   Lipid Profile   Result Value Ref Range    Cholesterol 119 <200 mg/dL    Triglycerides 65 <150 mg/dL    HDL Cholesterol 63 >39 mg/dL    LDL Cholesterol Calculated 43 <100 mg/dL    Non HDL Cholesterol 56 <130 mg/dL   ALT   Result Value Ref Range    ALT 24 0 - 70 U/L   Basic metabolic panel   Result Value Ref Range    Sodium Canceled, Test credited 133 - 144 mmol/L    Potassium Canceled, Test credited 3.4 - 5.3 mmol/L    Chloride Canceled, Test credited 94 - 109 mmol/L    Carbon Dioxide Canceled, Test credited 20 - 32 mmol/L    Anion Gap Canceled, Test credited 6 - 17 mmol/L    Glucose Canceled, Test credited 70 - 99 mg/dL    Urea Nitrogen Canceled, Test credited 7 - 30 mg/dL    Creatinine Canceled, Test credited 0.66 - 1.25 mg/dL    GFR Estimate Canceled, Test credited >60 mL/min/[1.73_m2]    GFR Estimate If Black Canceled, Test credited >60 mL/min/[1.73_m2]    Calcium Canceled, Test credited 8.5 - 10.1 mg/dL   Your test results fall within the expected range(s) or remain unchanged from previous results.  Please continue with current treatment plan. Please call [phone] with questions or concerns.     Sincerely,  [facility]

## 2019-06-28 NOTE — PROGRESS NOTES
Order for BMP (expected 5/8/2020) was inadvertently released and drawn on 6/25/19.  Spoke with [facility] and they will credit testing.  New order placed.  KMortimer RN

## 2019-06-28 NOTE — TELEPHONE ENCOUNTER
Reviewed labs showing   Recent Labs   Lab Test 06/25/19  0754 10/07/17  1036  02/14/15  0840 08/16/14  0927   CHOL 119 151   < > 138 161   HDL 63 65   < > 55 76   LDL 43 72   < > 72 73   TRIG 65 68   < > 57 62   CHOLHDLRATIO  --   --   --  2.5 2.1    < > = values in this interval not displayed.     Lab Results   Component Value Date    ALT 24 06/25/2019     Normal results mailed to pt with normal lab letter. Rayna PAULA

## 2019-07-01 LAB
ANION GAP SERPL CALCULATED.3IONS-SCNC: NORMAL MMOL/L (ref 6–17)
BUN SERPL-MCNC: NORMAL MG/DL (ref 7–30)
CALCIUM SERPL-MCNC: NORMAL MG/DL (ref 8.5–10.1)
CHLORIDE SERPL-SCNC: NORMAL MMOL/L (ref 94–109)
CO2 SERPL-SCNC: NORMAL MMOL/L (ref 20–32)
CREAT SERPL-MCNC: NORMAL MG/DL (ref 0.66–1.25)
GFR SERPL CREATININE-BSD FRML MDRD: NORMAL ML/MIN/{1.73_M2}
GLUCOSE SERPL-MCNC: NORMAL MG/DL (ref 70–99)
POTASSIUM SERPL-SCNC: NORMAL MMOL/L (ref 3.4–5.3)
SODIUM SERPL-SCNC: NORMAL MMOL/L (ref 133–144)

## 2019-07-19 ENCOUNTER — OFFICE VISIT (OUTPATIENT)
Dept: PODIATRY | Facility: CLINIC | Age: 74
End: 2019-07-19
Payer: COMMERCIAL

## 2019-07-19 ENCOUNTER — OFFICE VISIT (OUTPATIENT)
Dept: INTERNAL MEDICINE | Facility: CLINIC | Age: 74
End: 2019-07-19
Payer: COMMERCIAL

## 2019-07-19 VITALS
BODY MASS INDEX: 34.93 KG/M2 | TEMPERATURE: 97.7 F | WEIGHT: 257.9 LBS | OXYGEN SATURATION: 96 % | DIASTOLIC BLOOD PRESSURE: 53 MMHG | HEART RATE: 84 BPM | RESPIRATION RATE: 14 BRPM | SYSTOLIC BLOOD PRESSURE: 91 MMHG | HEIGHT: 72 IN

## 2019-07-19 VITALS
SYSTOLIC BLOOD PRESSURE: 108 MMHG | BODY MASS INDEX: 34.81 KG/M2 | DIASTOLIC BLOOD PRESSURE: 70 MMHG | HEIGHT: 72 IN | WEIGHT: 257 LBS

## 2019-07-19 DIAGNOSIS — L97.529 DIABETIC ULCER OF TOE OF LEFT FOOT ASSOCIATED WITH TYPE 2 DIABETES MELLITUS, UNSPECIFIED ULCER STAGE (H): ICD-10-CM

## 2019-07-19 DIAGNOSIS — M20.11 HAV (HALLUX ABDUCTO VALGUS), RIGHT: ICD-10-CM

## 2019-07-19 DIAGNOSIS — L02.611 CELLULITIS AND ABSCESS OF TOE OF RIGHT FOOT: ICD-10-CM

## 2019-07-19 DIAGNOSIS — L03.115 CELLULITIS OF RIGHT LOWER EXTREMITY: Primary | ICD-10-CM

## 2019-07-19 DIAGNOSIS — E11.42 DIABETIC POLYNEUROPATHY ASSOCIATED WITH TYPE 2 DIABETES MELLITUS (H): Primary | ICD-10-CM

## 2019-07-19 DIAGNOSIS — L97.512 ULCER OF RIGHT FOOT WITH FAT LAYER EXPOSED (H): ICD-10-CM

## 2019-07-19 DIAGNOSIS — E11.621 DIABETIC ULCER OF TOE OF LEFT FOOT ASSOCIATED WITH TYPE 2 DIABETES MELLITUS, UNSPECIFIED ULCER STAGE (H): ICD-10-CM

## 2019-07-19 DIAGNOSIS — I87.2 EDEMA OF BOTH LOWER EXTREMITIES DUE TO PERIPHERAL VENOUS INSUFFICIENCY: ICD-10-CM

## 2019-07-19 DIAGNOSIS — L03.031 CELLULITIS AND ABSCESS OF TOE OF RIGHT FOOT: ICD-10-CM

## 2019-07-19 PROBLEM — A41.9 SEPSIS (H): Status: RESOLVED | Noted: 2018-05-01 | Resolved: 2019-07-19

## 2019-07-19 PROBLEM — R06.00 DYSPNEA: Status: RESOLVED | Noted: 2019-02-17 | Resolved: 2019-07-19

## 2019-07-19 PROBLEM — R07.9 ACUTE CHEST PAIN: Status: RESOLVED | Noted: 2019-02-17 | Resolved: 2019-07-19

## 2019-07-19 PROCEDURE — 99214 OFFICE O/P EST MOD 30 MIN: CPT | Performed by: INTERNAL MEDICINE

## 2019-07-19 PROCEDURE — 99213 OFFICE O/P EST LOW 20 MIN: CPT | Mod: 25 | Performed by: PODIATRIST

## 2019-07-19 PROCEDURE — 11042 DBRDMT SUBQ TIS 1ST 20SQCM/<: CPT | Performed by: PODIATRIST

## 2019-07-19 RX ORDER — SILVER SULFADIAZINE 10 MG/G
CREAM TOPICAL DAILY
Qty: 25 G | Refills: 1 | Status: SHIPPED | OUTPATIENT
Start: 2019-07-19 | End: 2020-07-27

## 2019-07-19 RX ORDER — CLINDAMYCIN HCL 300 MG
300 CAPSULE ORAL 4 TIMES DAILY
Qty: 40 CAPSULE | Refills: 0 | Status: SHIPPED | OUTPATIENT
Start: 2019-07-19 | End: 2020-01-15

## 2019-07-19 ASSESSMENT — MIFFLIN-ST. JEOR
SCORE: 1938.74
SCORE: 1942.83

## 2019-07-19 NOTE — PROGRESS NOTES
Subjective     Lance Ibrahim is a 74 year old male who presents to clinic today for the following health issues:    HPI     Swelling and pain right lower extremity:  He reports that a week ago he first noticed blood on the tile on his bathroom floor under his right foot so he was able to look at the dorsum of the great toe with a mirror and saw an ulcer.  He said it first there was minimal bleeding, some serous fluid, he is kept it covered mostly with a Band-Aid and noticed decreasing drainage.  Then 2 days ago he started to notice some swelling on the top of the right foot with some erythema.  He has had a very scaly skin condition along the base of the toes medially for a few weeks, but no open sores on the top of the foot and this has gradually increased over the past 2 days so that today there is swelling into the toes, up the ankle.  He has severe neuropathy and does not feel anything in the foot, he feels a little soreness at the mid shin.    He reports no fever, chills.   He has had previous sepsis with severe cellulitis, osteomyelitis of the left foot requiring an amputation.  He does not feel he feel like he did with those infections.    Patient Active Problem List   Diagnosis     Ventral hernia     Chronic rhinitis     Hypertrophy of prostate with urinary obstruction     Transient cerebral ischemia     Essential hypertension     CARDIOVASCULAR SCREENING; LDL GOAL LESS THAN 100     Gout     Type 2 diabetes, HbA1c goal < 7% (H)     Obesity     Diabetes mellitus type 2, uncomplicated (H)     Benign essential hypertension     Cervicalgia     Cellulitis     Diabetic foot infection (H)     Amputated toe, left (H)     Type 2 diabetes mellitus with peripheral vascular disease (H)     Total knee replacement status     Dyspnea on exertion     Chest pain, unspecified type     Cardiomyopathy, unspecified type (H)     Shortness of breath     Abnormal stress test     Renal colic     Current Outpatient Medications    Medication Sig Dispense Refill     amLODIPine (NORVASC) 5 MG tablet Take 1 tablet (5 mg) by mouth daily 90 tablet 3     aspirin (ASA) 81 MG EC tablet Take 1 tablet (81 mg) by mouth daily 100 tablet 3     atorvastatin (LIPITOR) 40 MG tablet Take 1 tablet (40 mg) by mouth daily 90 tablet 3     blood glucose (ACCU-CHEK SMARTVIEW) test strip Use to test blood sugar 1 times daily or as directed. 100 strip 3     clindamycin (CLEOCIN) 300 MG capsule Take 1 capsule (300 mg) by mouth 4 times daily 40 capsule 0     Ferrous Gluconate 240 (27 Fe) MG TABS Take 1 tablet by mouth daily        finasteride (PROSCAR) 5 MG tablet Take 5 mg by mouth daily.       fluticasone (FLONASE) 50 MCG/ACT spray Spray 1 spray into both nostrils At Bedtime        isosorbide mononitrate (IMDUR) 30 MG 24 hr tablet Take 1 tablet (30 mg) by mouth daily 30 tablet 11     labetalol (NORMODYNE) 200 MG tablet Take 1 tablet (200 mg) by mouth 2 times daily 180 tablet 3     lisinopril (PRINIVIL/ZESTRIL) 10 MG tablet TAKE 3 TABLETS (30 MG) BY MOUTH DAILY 90 tablet 3     loratadine (CLARITIN) 10 MG tablet Take 10 mg by mouth At Bedtime        metFORMIN (GLUCOPHAGE) 1000 MG tablet TAKE 1 TABLET (1,000 MG) BY MOUTH 2 TIMES DAILY (WITH MEALS) **DUE FOR APRIL APPT/LABS. CALL MD** 180 tablet 1     Multiple Vitamins-Minerals (MULTIVITAMIN ADULTS PO) Take 1 tablet by mouth daily        tamsulosin (FLOMAX) 0.4 MG 24 hr capsule Take 1 capsule by mouth daily.       torsemide (DEMADEX) 20 MG tablet Take 1 tablet (20 mg) by mouth daily 90 tablet 3              Social History     Tobacco Use     Smoking status: Former Smoker     Last attempt to quit: 3/17/1993     Years since quittin.3     Smokeless tobacco: Never Used   Substance Use Topics     Alcohol use: Yes     Comment: Occ, Once a month     Drug use: No      Reviewed and updated as needed this visit by Provider         Review of Systems   No fever, chills, sweats, foot pain, mild soreness and slight tenderness  of the lower shin      Objective    BP 91/53 (BP Location: Left arm, Patient Position: Sitting, Cuff Size: Adult Large)   Pulse 84   Temp 97.7  F (36.5  C) (Oral)   Resp 14   Ht 1.829 m (6')   Wt 117 kg (257 lb 14.4 oz)   SpO2 96%   BMI 34.98 kg/m    There is no height or weight on file to calculate BMI.  Physical Exam     Right foot: There is mild erythema across the dorsal foot distally, very faint erythema at the ankle and lower shin.  There is significant edema of the foot, lower leg, toes.  There is some scaly plaque across the distal foot at the base of the first through third toes  On the plantar great toe there is a ulcer through the skin layer 2 cm X 1.5 cm.  There is no associated exudate, purulence,          Assessment & Plan     1. Cellulitis of right lower extremity  The history suggests that the cellulitis may have arisen on the top of the foot rather than related to the ulcer on the bottom of the great toe.  This could have originated with the scaly rash.  We will start him on clindamycin, advised to elevate his leg as much as possible, advised that if he starts having fever, chills, worsening redness over the next 1 to 2 days, he would need to go to the ED and likely be admitted for IV antibiotics.  If it is improving, he can continue taking oral antibiotics and follow-up in 7 to 10 days.  - clindamycin (CLEOCIN) 300 MG capsule; Take 1 capsule (300 mg) by mouth 4 times daily  Dispense: 40 capsule; Refill: 0  - ORTHO  REFERRAL    2. Diabetic ulcer of toe of left foot associated with type 2 diabetes mellitus, unspecified ulcer stage (H)  Again it is unclear whether the ulcer is related to the cellulitis or not but it is fairly thick ulcers so will arrange for him to follow-up with podiatry early next week, advised him to call and if he is having any difficulty getting the appointment scheduled, he should call us back and we can try to contact Dr. Azevedo directly about scheduling  appointment for this.  - ORTHO  REFERRAL         Shyla Barrett MD  [facility]

## 2019-07-19 NOTE — PATIENT INSTRUCTIONS
"Thank you for choosing [facility]!    DR. RANDHAWA'S CLINIC SCHEDULE  MONDAY AM - [place] TUESDAY - [place]   [address] [address] [address] [address] [place] [address]   [phone] / [phone] / [phone]       WEDNESDAY - [place] FRIDAY AM - [facility]   [address]   [place] [address] [place] [address]   [phone] / [phone]       FRIDAY PM - [place] SCHEDULE SURGERY: [phone]   [address] BILLING QUESTIONS: [phone]   [place] [id number] AFTER HOURS: [phone] APPOINTMENTS: [phone]     Consumer Price Line (CPL) [phone]     3 week follow up      DIABETES AND YOUR FEET  Diabetes can result in several problems in the feet including ulcers (open sores) and amputations. Two of the most important reasons why people develop foot problems when they have diabetes is : 1. Neuropathy (loss of feeling)  2. Vascular disease (loss or decrease of blood flow).    Neuropathy is a term used to describe a loss of nerve function.  Patients with diabetes are at risk of developing neuropathy if their sugars continue to run high and are above the normal value. One theory for neuropathy is that the \"extra\" sugar in the body enters the nerves and is broken down. These by-products build up in the nerve causing it to swell and impairing nerve function. Often times, this can be prevented by controlling your sugars, dieting and exercise.    When a person develops neuropathy, they usually begin to feel numbness or tingling in their feet and sometime in their legs.  Other symptoms may include painful burning or hot feet, tingling or feeling like insects or ants are crawling on your feet or legs.  If the diabetes is sever and the sugars run high for long periods of time, neuropathy can also occur in the hands.    Vascular disease  is a term " used to describe a loss or decrease in circulation (blood flow). There is a problem in getting blood and oxygen to areas that need it. Similar to neuropathy, sugars can build up in the walls of the arteries (blood vessels) and cause them to become swollen, thickened and hardened. This decreases the amount of blood that can go to an area that needs it. Though this is common in the legs of diabetic patients, it can also affect other arteries (blood vessels) in the body such as in the heart and eyes.    In the legs, vascular disease usually results in cramping. Patients who develop leg cramps after walking the same distance every time (i.e. One block, half a mile, ect.) need to let their doctors know so that their circulation may be checked. Cramps causing severe pain in the feet and/or legs while sleeping and the cramps go away when you stand or hang your legs off the side of the bed, may also be a sign of poor blood circulation.  Occasional cramping in cold weather or on rare occasions with activity may not be due to poor circulation, but you should inform your doctor.    PREVENTION OF THESE DISEASES  The key to prevention is good blood sugar control. Poor blood sugar control is a big reason many of these problems start. Physical activity (exercise) is a very good way to help decrease your blood sugars. Exercise can lower your blood sugar, blood pressure, and cholesterol. It also reduces your risk for heart disease and stroke, relieves stress, and strengthens your heart, muscles and bones.  In addition, regular activity helps insulin work better, improves your blood circulation, and keeps your joints flexible. If you're trying to lose weight, a combination of exercise and wise food choices can help you reach your target weight and maintain it.      PAIN MANAGEMENT  1.Blood Sugar Control - Most important  2. Medications such as:  Amytriptylline, duloxetine, gabapentin, lyrica, tramadol  3. Nutritional therapy:   Vitamin B6 (100mg daily), Vitamin B12 (75mcg daily), Vitamin D 2000 IU daily), Alpha-Lipoic Acid (600-1800mg daily), Acetyl-L-Carnitine (500-1000mg TID, L-methyl folate (1500mcg daily)    ** Metformin can block Vitamin B6 and B12 so it is important to supplement**    FOOT CARE RECOMMENDATIONS   1. Wash your feet with lukewarm water and a mild soap and then dry them thoroughly, especially between the toes.     2. Examine your feet daily looking for cuts, corns, blisters, cracks, ect, especially after wearing new shoes. Make sure to look between your toes. If you cannot see the bottom of your feet, set a mirror on the floor and hold your foot over it, or ask a spouse, friend or family member to examine your feet for you. Contact your doctor immediately if new problems are noted or if sores are not healing.     3. Immediately apply moisturizer to the tops and bottoms of your feet, avoiding areas between the toes. Hand lotion (Intesive Care, Marta, Eucerin, Neutrogena, Curel, ect) is sufficient unless your doctor prescribes a medicated lotion. Apply sunscreen to your feet when going swimming outside.     4. Use clean comfortable shoes, wear white socks (if you have any bleeding or drainage, you will see it on white socks). Socks should not have thick seams or cut off the circulation around the leg. Break in new shoes slowly and rotate with older shoes until broken in. Check the inside of your shoes with your hand to look for areas of irritation or objects that may have fallen into your shoes.       5. Keep slippers by the side of your bed for use during the night.     6.  Shoes should be fitted by a professional and should not cause areas of irritation.  Check your feet regularly when wearing a new pair of shoes and replace them as needed.     7.  Talk to your doctor about proper exercise. Exercise and stretching stimulate blood flow to your feet and maintain proper glucose levels.     8.  Monitor your blood glucose  level as instructed by your doctor. Notify your doctor immediately if your blood sugar is abnormally high or low.    9. Cut your nails straight across, but then gently round any sharp edges with a cardboard nail file. If you have neuropathy, peripheral vascular disease or cannot see that well to trim your own toenails contact [facility] ([phone]) or [facility] ([phone]).      THINGS TO AVOID DOING   1.  Do not soak your feet if you have an open sore. Use only lukewarm water and always check the temperature with your hand as hot water can easily burn your feet.       2.  Never use a hot water bottle or heating pad on your feet. Also do not apply cold compresses to your feet. With decreased sensation, you could burn or freeze your feet.       3.  Do not apply any of these to your feet:    -  Over the counter medicine for corns or warts    -  Harsh chemicals like boric acid    -  Do not self-treat corns, cuts, blisters or infections. Always consult your doctor.       4.  Do not wear sandals, slippers or walk barefoot, especially on hot sand or concrete or other harsh surfaces.     5.  If you smoke, stop!!!          FOOT ULCER (WOUND) EDUCATION  Ulceration ofthe foot involves a break or hole in the skin. Skin is our best protection against infection. Skin is quite durable, however, the underlying tissues are fragile. For this reason, the wound is likely to deepen rapidly. Deep wounds usually get infected and require amputation. Prompt healing is therefore essential to avoid limb loss.     Foot ulcers do not heal without intervention. Walking on the foot and living your normal life is not typically compatible with healing the sore. Successful healing will require several months of significant alteration of your daily activities.   Ulcer complications frequently develop. This primarily includes infection of skin, which then spreads deep into your joints, bones and tendons. Spreading infection may travel up  your leg and into other parts of your body. Deep infection is usually treated with amputation ofpart ofyour foot or your leg. Signs of infection include fever, chills, nausea, vomiting, erratic blood sugars, local redness, pus, strong odor and localized warmth. Signs of infection should be taken seriously. Prompt evaluation in the clinic or hospital emergency room is required.   Ulcer treatment requires debridement or surgical removal of devitalized tissue. Your doctor will trim away callused, moistened, unhealthy tissue from the wound surface and margin. This helps to clean the wound and allows proper inspection. Debridement also stimulates healing even though the wound originally appears larger. Expect some bleeding with each debridement. You will be given instruction regarding wound bandaging. This often includes ointment and gauze. Avoid tape directly on the skin. Hand washing is essential since most infections will come from your fingertips. Ulcer care requires a no touch technique. Your fingers should not touch the margin or base of the wound.    HELPFUL HEALING TIPS:  1. Debridement: Getting rid of bad tissue makes way for good tissue to promote healing  2. Addressing Foot Deformities: Hammertoes and bunions can cause increased pressure  3. Pressure Reduction: If pressure remains to the wound, it won't heal  4. Good Pulses: If bloodflow is not getting to the foot, the ulcer will not heal  5. Good Nutrition: If you are not getting proper nutrition your body can't heal.Protein!  6. Infection Control: Keep the ulcer clean with wound cleanser. DO NOT SOAK IT!  7. Moisture Control: Keep edema down and make sure that drainage is getting pulled away from the ulcer    IMPORTANCE OF DEBRIDEMENT    Reduces bioburden to help control or reduce infection. Even if an ulcer is not  infected,  the bacterial bioburden causes increased local inflammation.    Allows more accurate visualization of the wound base and edges, which  allows for more precise staging.    Removes necrotic/non-viable tissue, which impedes wound healing, causes protein loss and can be a nidus for infection.    Stimulates new circulation (angiogenesis) and allows adequate oxygen delivery to the wound.    Removes undermining and tunneling, and may help reduce abscess formation.    Releases healing growth factors at the edge of the wound.    Prepares the wound bed by leaving only tissues that are capable of regenerating.          BODY WEIGHT AND YOUR FEET  The following information is included in the after visit summary for all patients. Body weight can be a sensitive issue to discuss in clinic, but we think the following information is very important. Although we focus on the feet and ankles, we do support the overall health of our patients.     Many things can cause foot and ankle problems. Foot structure, activity level, foot mechanics and injuries are common causes of pain. One very important issue that often goes unmentioned, is body weight. Extra weight can cause increased stress on muscles, ligaments, bones and tendons. Sometimes just a few extra pounds is all it takes to put one over her/his threshold. Without reducing that stress, it can be difficult to alleviate pain. As Foot & Ankle specialists, our job is addressing the lower extremity problem and possible causes. Regarding extra body weight, we encourage patients to discuss diet and weight management plans with their primary care doctors. It is this team approach that gives you the best opportunity for pain relief and getting you back on your feet.      [facility] has a Comprehensive Weight Management Program. This program includes counseling, education, non-surgical and surgical approaches to weight loss. If you are interested in learning more either talk to you primary care provider or call [phone].

## 2019-07-19 NOTE — LETTER
7/19/2019         RE: Lance Ibrahim  [address] [place] [address]        Dear Colleague,    Thank you for referring your patient, Lance Ibrahim, to the [facility]. Please see a copy of my visit note below.    Podiatry / Foot and Ankle Surgery Progress Note    July 19, 2019    Subject: Patient was seen for redness and swelling and new ulcer to right foot. heis very concerned with the swelling. Started about 2 days ago.  Was seen by primary and started on antibiotics. Denies fever, chills ,nausa. Not sure how he got wound.     Objective:  Vitals: /70   Ht 1.829 m (6')   Wt 116.6 kg (257 lb)   BMI 34.86 kg/m     BMI= Body mass index is 34.86 kg/m .    A1C: 6.5 (3/2019)    General:  Patient is alert and orientated.  NAD  Vascular:  DP and PT pulses are palpable.  Edema and varicosities noted.  CFT's < 3secs.  Skin temp is normal  Neuro:  Light and gross touch sensation is absent to feet.   Derm:  Full thickness ulcer to the plantar right interphalangeal joint measures 2.0cm x 1.5cm x 0.1cm after debridement. Redness to dorsal foot and right great toe.   Musculoskeletal:  Decrease arch height. Lateral deviation of the right hallux.       Assessment:    Diabetic polyneuropathy associated with type 2 diabetes mellitus (H)  Ulcer of right foot with fat layer exposed (H)  Hav (hallux abducto valgus), right  Cellulitis and abscess of toe of right foot  Edema of both lower extremities due to peripheral venous insufficiency    Plan:  Wound was debrided. Recommend he wear his tall aircast boot to offload. Will try silvadene cream. Did note sulfa allergy but states he never had it, it was the penicillin. Recommend compression, was given tensogrip. He is currently on antibiotics and recommend continuing those out. Follow up in 3 weeks. If he develops systemic signs of infection, recommend going to Emergency department.     Procedure: After verbal consent, excisional debridement was  performed on ulcer.  #15 blade was used to debride ulcer down to and including subcutaneous tissue. Bleeding controlled with light pressure.   No drainage noted.  No anesthesia was used due to neuropathy. Dry dressing applied to foot.  Patient tolerated procedure well.      Ayaka Azevedo DPM, Podiatry/Foot and Ankle Surgery    Weight management plan: Patient was referred to their PCP to discuss a diet and exercise plan.    Recommended to Lance Ibrahim to follow up with Primary Care provider regarding elevated blood pressure.      Again, thank you for allowing me to participate in the care of your patient.        Sincerely,        Ayaka Azevedo DPM, Podiatry/Foot and Ankle Surgery

## 2019-07-19 NOTE — PROGRESS NOTES
Podiatry / Foot and Ankle Surgery Progress Note    July 19, 2019    Subject: Patient was seen for redness and swelling and new ulcer to right foot. heis very concerned with the swelling. Started about 2 days ago.  Was seen by primary and started on antibiotics. Denies fever, chills ,nausa. Not sure how he got wound.     Objective:  Vitals: /70   Ht 1.829 m (6')   Wt 116.6 kg (257 lb)   BMI 34.86 kg/m    BMI= Body mass index is 34.86 kg/m .    A1C: 6.5 (3/2019)    General:  Patient is alert and orientated.  NAD  Vascular:  DP and PT pulses are palpable.  Edema and varicosities noted.  CFT's < 3secs.  Skin temp is normal  Neuro:  Light and gross touch sensation is absent to feet.   Derm:  Full thickness ulcer to the plantar right interphalangeal joint measures 2.0cm x 1.5cm x 0.1cm after debridement. Redness to dorsal foot and right great toe.   Musculoskeletal:  Decrease arch height. Lateral deviation of the right hallux.       Assessment:    Diabetic polyneuropathy associated with type 2 diabetes mellitus (H)  Ulcer of right foot with fat layer exposed (H)  Hav (hallux abducto valgus), right  Cellulitis and abscess of toe of right foot  Edema of both lower extremities due to peripheral venous insufficiency    Plan:  Wound was debrided. Recommend he wear his tall aircast boot to offload. Will try silvadene cream. Did note sulfa allergy but states he never had it, it was the penicillin. Recommend compression, was given tensogrip. He is currently on antibiotics and recommend continuing those out. Follow up in 3 weeks. If he develops systemic signs of infection, recommend going to Emergency department.     Procedure: After verbal consent, excisional debridement was performed on ulcer.  #15 blade was used to debride ulcer down to and including subcutaneous tissue. Bleeding controlled with light pressure.   No drainage noted.  No anesthesia was used due to neuropathy. Dry dressing applied to foot.  Patient  tolerated procedure well.      Ayaka Azevedo DPM, Podiatry/Foot and Ankle Surgery    Weight management plan: Patient was referred to their PCP to discuss a diet and exercise plan.    Recommended to Lance Ibrahim to follow up with Primary Care provider regarding elevated blood pressure.

## 2019-07-25 ENCOUNTER — OFFICE VISIT (OUTPATIENT)
Dept: INTERNAL MEDICINE | Facility: CLINIC | Age: 74
End: 2019-07-25
Payer: COMMERCIAL

## 2019-07-25 VITALS
TEMPERATURE: 99 F | OXYGEN SATURATION: 92 % | WEIGHT: 254.4 LBS | DIASTOLIC BLOOD PRESSURE: 52 MMHG | HEIGHT: 72 IN | BODY MASS INDEX: 34.46 KG/M2 | SYSTOLIC BLOOD PRESSURE: 88 MMHG | RESPIRATION RATE: 16 BRPM | HEART RATE: 92 BPM

## 2019-07-25 DIAGNOSIS — L03.119 CELLULITIS OF FOOT: Primary | ICD-10-CM

## 2019-07-25 PROCEDURE — 99213 OFFICE O/P EST LOW 20 MIN: CPT | Performed by: INTERNAL MEDICINE

## 2019-07-25 ASSESSMENT — MIFFLIN-ST. JEOR: SCORE: 1926.95

## 2019-07-25 NOTE — NURSING NOTE
Vital signs:  Temp: 99  F (37.2  C) Temp src: Oral BP: (!) 88/52 Pulse: 92   Resp: 16 SpO2: 92 %     Height: 182.9 cm (6') Weight: 115.4 kg (254 lb 6.4 oz)  Estimated body mass index is 34.5 kg/m  as calculated from the following:    Height as of this encounter: 1.829 m (6').    Weight as of this encounter: 115.4 kg (254 lb 6.4 oz).

## 2019-07-25 NOTE — PROGRESS NOTES
Subjective     Lance Ibrahim is a 74 year old male who presents to clinic today for the following health issues:    HPI   Follow-up cellulitis of the right foot: He reports significant improvement in swelling and erythema.  The scaly skin has improved significantly with use of Vanicream.  He was able to see podiatry the day after I saw him for his great toe ulcer and will have follow-up with podiatry again.  That seems to be doing well.    No other acute concerns.        Patient Active Problem List   Diagnosis     Ventral hernia     Chronic rhinitis     Hypertrophy of prostate with urinary obstruction     Transient cerebral ischemia     Essential hypertension     CARDIOVASCULAR SCREENING; LDL GOAL LESS THAN 100     Gout     Type 2 diabetes, HbA1c goal < 7% (H)     Obesity     Diabetes mellitus type 2, uncomplicated (H)     Benign essential hypertension     Cervicalgia     Cellulitis     Diabetic foot infection (H)     Amputated toe, left (H)     Type 2 diabetes mellitus with peripheral vascular disease (H)     Total knee replacement status     Dyspnea on exertion     Chest pain, unspecified type     Cardiomyopathy, unspecified type (H)     Shortness of breath     Abnormal stress test     Renal colic     Current Outpatient Medications   Medication Sig Dispense Refill     amLODIPine (NORVASC) 5 MG tablet Take 1 tablet (5 mg) by mouth daily 90 tablet 3     aspirin (ASA) 81 MG EC tablet Take 1 tablet (81 mg) by mouth daily 100 tablet 3     atorvastatin (LIPITOR) 40 MG tablet Take 1 tablet (40 mg) by mouth daily 90 tablet 3     blood glucose (ACCU-CHEK SMARTVIEW) test strip Use to test blood sugar 1 times daily or as directed. 100 strip 3     clindamycin (CLEOCIN) 300 MG capsule Take 1 capsule (300 mg) by mouth 4 times daily 40 capsule 0     Ferrous Gluconate 240 (27 Fe) MG TABS Take 1 tablet by mouth daily        finasteride (PROSCAR) 5 MG tablet Take 5 mg by mouth daily.       fluticasone (FLONASE) 50 MCG/ACT spray  Spray 1 spray into both nostrils At Bedtime        isosorbide mononitrate (IMDUR) 30 MG 24 hr tablet Take 1 tablet (30 mg) by mouth daily 30 tablet 11     labetalol (NORMODYNE) 200 MG tablet Take 1 tablet (200 mg) by mouth 2 times daily 180 tablet 3     lisinopril (PRINIVIL/ZESTRIL) 10 MG tablet TAKE 3 TABLETS (30 MG) BY MOUTH DAILY 90 tablet 3     loratadine (CLARITIN) 10 MG tablet Take 10 mg by mouth At Bedtime        metFORMIN (GLUCOPHAGE) 1000 MG tablet TAKE 1 TABLET (1,000 MG) BY MOUTH 2 TIMES DAILY (WITH MEALS) **DUE FOR APRIL APPT/LABS. CALL MD** 180 tablet 1     Multiple Vitamins-Minerals (MULTIVITAMIN ADULTS PO) Take 1 tablet by mouth daily        silver sulfADIAZINE (SILVADENE) 1 % external cream Apply topically daily 25 g 1     tamsulosin (FLOMAX) 0.4 MG 24 hr capsule Take 1 capsule by mouth daily.       torsemide (DEMADEX) 20 MG tablet Take 1 tablet (20 mg) by mouth daily 90 tablet 3      Social History     Tobacco Use     Smoking status: Former Smoker     Last attempt to quit: 3/17/1993     Years since quittin.3     Smokeless tobacco: Never Used   Substance Use Topics     Alcohol use: Yes     Comment: Occ, Once a month     Drug use: No      Reviewed and updated as needed this visit by Provider         Review of Systems   No fever, chills      Objective    BP (!) 88/52   Pulse 92   Temp 99  F (37.2  C) (Oral)   Resp 16   Ht 1.829 m (6')   Wt 115.4 kg (254 lb 6.4 oz)   SpO2 92%   BMI 34.50 kg/m    Body mass index is 34.5 kg/m .  Physical Exam     Right foot: There is some mild hyperemia but overall the significant erythema  and induration have cleared.  There has been significant improvement in the swelling though not completely resolved.  There is no significant tenderness of the foot.  The scaly skin near the base of the toes is improved significantly with some minimal thickening yet present.          Assessment & Plan     1. Cellulitis of foot  Resolving.  Likely was related to break in the  skin not the ulcer.  He can complete his course of antibiotics, continue trying to elevate and continue using Vanicream on the skin.  Continue follow-up with podiatry for the ulcer.           No follow-ups on file.    Shyla Barrett MD  [facility]

## 2019-08-23 ENCOUNTER — OFFICE VISIT (OUTPATIENT)
Dept: PODIATRY | Facility: CLINIC | Age: 74
End: 2019-08-23
Payer: COMMERCIAL

## 2019-08-23 VITALS
DIASTOLIC BLOOD PRESSURE: 60 MMHG | BODY MASS INDEX: 34.4 KG/M2 | SYSTOLIC BLOOD PRESSURE: 110 MMHG | HEIGHT: 72 IN | WEIGHT: 254 LBS

## 2019-08-23 DIAGNOSIS — M20.11 HAV (HALLUX ABDUCTO VALGUS), RIGHT: ICD-10-CM

## 2019-08-23 DIAGNOSIS — L97.512 DIABETIC ULCER OF TOE OF RIGHT FOOT ASSOCIATED WITH TYPE 2 DIABETES MELLITUS, WITH FAT LAYER EXPOSED (H): ICD-10-CM

## 2019-08-23 DIAGNOSIS — E11.621 DIABETIC ULCER OF TOE OF RIGHT FOOT ASSOCIATED WITH TYPE 2 DIABETES MELLITUS, WITH FAT LAYER EXPOSED (H): ICD-10-CM

## 2019-08-23 DIAGNOSIS — I87.2 EDEMA OF BOTH LOWER EXTREMITIES DUE TO PERIPHERAL VENOUS INSUFFICIENCY: ICD-10-CM

## 2019-08-23 DIAGNOSIS — E11.42 DIABETIC POLYNEUROPATHY ASSOCIATED WITH TYPE 2 DIABETES MELLITUS (H): Primary | ICD-10-CM

## 2019-08-23 PROCEDURE — 11042 DBRDMT SUBQ TIS 1ST 20SQCM/<: CPT | Performed by: PODIATRIST

## 2019-08-23 PROCEDURE — 99212 OFFICE O/P EST SF 10 MIN: CPT | Mod: 25 | Performed by: PODIATRIST

## 2019-08-23 ASSESSMENT — MIFFLIN-ST. JEOR: SCORE: 1925.14

## 2019-08-23 NOTE — PATIENT INSTRUCTIONS
Thank you for choosing [facility]!    DR. RANDHAWA'S CLINIC SCHEDULE  MONDAY AM - [place] TUESDAY - [place]   [address] [address] [address] [address] [place] [address]   [phone] / [phone] / [phone]       WEDNESDAY - [place] FRIDAY AM - WOUND [facility]ENTER   [address]   [place] [address] [place] [address]   [phone] / [phone]       FRIDAY PM - [place] SCHEDULE SURGERY: [phone]   [address] BILLING QUESTIONS: [phone]   [place] [id number] AFTER HOURS: [phone]   [phone] / [phone] APPOINTMENTS: [phone]     Consumer Price Line (CPL) [phone]       One month follow up        BODY WEIGHT AND YOUR FEET  The following information is included in the after visit summary for all patients. Body weight can be a sensitive issue to discuss in clinic, but we think the following information is very important. Although we focus on the feet and ankles, we do support the overall health of our patients.     Many things can cause foot and ankle problems. Foot structure, activity level, foot mechanics and injuries are common causes of pain. One very important issue that often goes unmentioned, is body weight. Extra weight can cause increased stress on muscles, ligaments, bones and tendons. Sometimes just a few extra pounds is all it takes to put one over her/his threshold. Without reducing that stress, it can be difficult to alleviate pain. As Foot & Ankle specialists, our job is addressing the lower extremity problem and possible causes. Regarding extra body weight, we encourage patients to discuss diet and weight management plans with their primary care doctors. It is this team approach that gives you the best opportunity for pain relief and getting you back on your feet.      [facility] has a Comprehensive Weight Management Program. This program  includes counseling, education, non-surgical and surgical approaches to weight loss. If you are interested in learning more either talk to you primary care provider or call [phone].

## 2019-08-23 NOTE — PROGRESS NOTES
Podiatry / Foot and Ankle Surgery Progress Note    August 23, 2019    Subject: Patient was seen for follow up on right great toe ulcer. Notes not much has changed. Denies fever, chills ,nausa. Minimal drainage. Has not been wearing the tall cast boot.     Objective:  Vitals: /60   Ht 1.829 m (6')   Wt 115.2 kg (254 lb)   BMI 34.45 kg/m    BMI= Body mass index is 34.45 kg/m .    A1C: 6.5 (3/2019)     General:  Patient is alert and orientated.  NAD    Vascular:  DP and PT pulses are palpable.  Edema and varicosities noted.  CFT's < 3secs.  Skin temp is normal     Neuro:  Light and gross touch sensation is absent to feet.     Derm:  Full thickness ulcer to the plantar right interphalangeal joint measures 1.5cm x 1.5cm x 0.2cm after debridement.  base of wound is granular. No redness, purulent drainage or malodor noted.     Musculoskeletal:  Decrease arch height. Lateral deviation of the right hallux.      Assessment:    Diabetic polyneuropathy associated with type 2 diabetes mellitus (H)  Ulcer of right foot with fat layer exposed (H)  Hav (hallux abducto valgus), right  Cellulitis and abscess of toe of right foot  Edema of both lower extremities due to peripheral venous insufficiency     Plan:  Wound was debrided. Patient not in the boot that was recommended. In regular tennis shoes without offloading. Discussed that we need to get pressure off the area for the wound to heal. talked about diabetic shoes and inserts. He declined. Insert was modified with felted foam to try to get pressure off of the area. Continue silvadene dressing changes daily. Recommend compression, was given tensogrip. Follow up in 4 weeks. If he develops systemic signs of infection, recommend going to Emergency department.      Procedure: After verbal consent, excisional debridement was performed on ulcer.  #15 blade was used to debride ulcer down to and including subcutaneous tissue. Bleeding controlled with light pressure.   No  drainage noted.  No anesthesia was used due to neuropathy. Dry dressing applied to foot.  Patient tolerated procedure well.     Ayaka Azevedo DPM, Podiatry/Foot and Ankle Surgery    Weight management plan: Patient was referred to their PCP to discuss a diet and exercise plan.

## 2019-08-23 NOTE — LETTER
8/23/2019         RE: Lance Ibrahim  [address] [place] [address]        Dear Colleague,    Thank you for referring your patient, Lance Ibrahim, to the [facility]. Please see a copy of my visit note below.    Podiatry / Foot and Ankle Surgery Progress Note    August 23, 2019    Subject: Patient was seen for follow up on right great toe ulcer. Notes not much has changed. Denies fever, chills ,nausa. Minimal drainage. Has not been wearing the tall cast boot.     Objective:  Vitals: /60   Ht 1.829 m (6')   Wt 115.2 kg (254 lb)   BMI 34.45 kg/m     BMI= Body mass index is 34.45 kg/m .    A1C: 6.5 (3/2019)     General:  Patient is alert and orientated.  NAD    Vascular:  DP and PT pulses are palpable.  Edema and varicosities noted.  CFT's < 3secs.  Skin temp is normal     Neuro:  Light and gross touch sensation is absent to feet.     Derm:  Full thickness ulcer to the plantar right interphalangeal joint measures 1.5cm x 1.5cm x 0.2cm after debridement.  base of wound is granular. No redness, purulent drainage or malodor noted.     Musculoskeletal:  Decrease arch height. Lateral deviation of the right hallux.      Assessment:    Diabetic polyneuropathy associated with type 2 diabetes mellitus (H)  Ulcer of right foot with fat layer exposed (H)  Hav (hallux abducto valgus), right  Cellulitis and abscess of toe of right foot  Edema of both lower extremities due to peripheral venous insufficiency     Plan:  Wound was debrided. Patient not in the boot that was recommended. In regular tennis shoes without offloading. Discussed that we need to get pressure off the area for the wound to heal. talked about diabetic shoes and inserts. He declined. Insert was modified with felted foam to try to get pressure off of the area. Continue silvadene dressing changes daily. Recommend compression, was given tensogrip. Follow up in 4 weeks. If he develops systemic signs of infection, recommend going  to Emergency department.      Procedure: After verbal consent, excisional debridement was performed on ulcer.  #15 blade was used to debride ulcer down to and including subcutaneous tissue. Bleeding controlled with light pressure.   No drainage noted.  No anesthesia was used due to neuropathy. Dry dressing applied to foot.  Patient tolerated procedure well.     Ayaka Azevedo DPM, Podiatry/Foot and Ankle Surgery    Weight management plan: Patient was referred to their PCP to discuss a diet and exercise plan.      Again, thank you for allowing me to participate in the care of your patient.        Sincerely,        Ayaka Azevedo DPM, Podiatry/Foot and Ankle Surgery

## 2019-08-24 DIAGNOSIS — E11.9 TYPE 2 DIABETES MELLITUS WITHOUT COMPLICATION, WITHOUT LONG-TERM CURRENT USE OF INSULIN (H): ICD-10-CM

## 2019-08-26 NOTE — TELEPHONE ENCOUNTER
"Requested Prescriptions   Pending Prescriptions Disp Refills     metFORMIN (GLUCOPHAGE) 1000 MG tablet [Pharmacy Med Name: METFORMIN HCL 1,000 MG TABLET] 180 tablet 1     Sig: TAKE 1 TABLET BY MOUTH TWICE A DAY WITH MEALS   Last Written Prescription Date:  12/24/2018  Last Fill Quantity: 180,  # refills: 01   Last office visit: 7/25/2019 with prescribing provider:     Future Office Visit:      Biguanide Agents Failed - 8/24/2019 11:50 AM        Failed - Patient has had a Microalbumin in the past 15 mos.     Recent Labs   Lab Test 03/05/16  0945   MICROL 72   UMALCR 50.35*             Passed - Blood pressure less than 140/90 in past 6 months     BP Readings from Last 3 Encounters:   08/23/19 110/60   07/25/19 (!) 88/52   07/19/19 108/70                 Passed - Patient has documented LDL within the past 12 mos.     Recent Labs   Lab Test 06/25/19  0754   LDL 43             Passed - Patient is age 10 or older        Passed - Patient has documented A1c within the specified period of time.     If HgbA1C is 8 or greater, it needs to be on file within the past 3 months.  If less than 8, must be on file within the past 6 months.     Recent Labs   Lab Test 03/25/19  1647   A1C 6.3*             Passed - Patient's CR is NOT>1.4 OR Patient's EGFR is NOT<45 within past 12 mos.     Recent Labs   Lab Test 06/25/19  0754   GFRESTIMATED Canceled, Test credited   GFRESTBLACK Canceled, Test credited       Recent Labs   Lab Test 06/25/19  0754   CR Canceled, Test credited             Passed - Patient does NOT have a diagnosis of CHF.        Passed - Medication is active on med list        Passed - Recent (6 mo) or future (30 days) visit within the authorizing provider's specialty     Patient had office visit in the last 6 months or has a visit in the next 30 days with authorizing provider or within the authorizing provider's specialty.  See \"Patient Info\" tab in inbasket, or \"Choose Columns\" in Meds & Orders section of the refill " encounter.

## 2019-09-24 ENCOUNTER — TRANSFERRED RECORDS (OUTPATIENT)
Dept: HEALTH INFORMATION MANAGEMENT | Facility: CLINIC | Age: 74
End: 2019-09-24

## 2019-09-27 ENCOUNTER — DOCUMENTATION ONLY (OUTPATIENT)
Dept: CARDIOLOGY | Facility: CLINIC | Age: 74
End: 2019-09-27

## 2019-09-27 DIAGNOSIS — I42.9 CARDIOMYOPATHY, UNSPECIFIED TYPE (H): Primary | ICD-10-CM

## 2019-09-27 NOTE — PROGRESS NOTES
Pt scheduled to see SVETLANA Givens on 9/30.  Per Dr. MONROE note from 5/9/2019, he wanted pt to have an echo prior to that OV.  Order placed for echo.  Pt notified.  Call transferred to scheduling to set up the echo and r/s the lab and OV appts.  KMortimer, RN

## 2019-10-11 ENCOUNTER — HOSPITAL ENCOUNTER (OUTPATIENT)
Dept: CARDIOLOGY | Facility: CLINIC | Age: 74
Discharge: HOME | End: 2019-10-11
Attending: INTERNAL MEDICINE | Admitting: INTERNAL MEDICINE
Payer: COMMERCIAL

## 2019-10-11 DIAGNOSIS — I50.32 CHRONIC DIASTOLIC HEART FAILURE (H): ICD-10-CM

## 2019-10-11 DIAGNOSIS — I10 ESSENTIAL HYPERTENSION: ICD-10-CM

## 2019-10-11 DIAGNOSIS — I42.9 CARDIOMYOPATHY, UNSPECIFIED TYPE (H): ICD-10-CM

## 2019-10-11 LAB
ANION GAP SERPL CALCULATED.3IONS-SCNC: 12 MMOL/L (ref 3–14)
BUN SERPL-MCNC: 30 MG/DL (ref 7–30)
CALCIUM SERPL-MCNC: 8.9 MG/DL (ref 8.5–10.1)
CHLORIDE SERPL-SCNC: 109 MMOL/L (ref 94–109)
CO2 SERPL-SCNC: 19 MMOL/L (ref 20–32)
CREAT SERPL-MCNC: 1.43 MG/DL (ref 0.66–1.25)
GFR SERPL CREATININE-BSD FRML MDRD: 47 ML/MIN/{1.73_M2}
GLUCOSE SERPL-MCNC: 168 MG/DL (ref 70–99)
POTASSIUM SERPL-SCNC: 4.4 MMOL/L (ref 3.4–5.3)
SODIUM SERPL-SCNC: 140 MMOL/L (ref 133–144)

## 2019-10-11 PROCEDURE — 40000264 ECHOCARDIOGRAM COMPLETE

## 2019-10-11 PROCEDURE — 36415 COLL VENOUS BLD VENIPUNCTURE: CPT | Performed by: INTERNAL MEDICINE

## 2019-10-11 PROCEDURE — 93306 TTE W/DOPPLER COMPLETE: CPT | Mod: 26 | Performed by: INTERNAL MEDICINE

## 2019-10-11 PROCEDURE — 25500064 ZZH RX 255 OP 636: Performed by: INTERNAL MEDICINE

## 2019-10-11 PROCEDURE — 80048 BASIC METABOLIC PNL TOTAL CA: CPT | Performed by: INTERNAL MEDICINE

## 2019-10-11 RX ADMIN — HUMAN ALBUMIN MICROSPHERES AND PERFLUTREN 3 ML: 10; .22 INJECTION, SOLUTION INTRAVENOUS at 08:01

## 2019-10-15 ENCOUNTER — OFFICE VISIT (OUTPATIENT)
Dept: CARDIOLOGY | Facility: CLINIC | Age: 74
End: 2019-10-15
Attending: INTERNAL MEDICINE
Payer: COMMERCIAL

## 2019-10-15 VITALS
BODY MASS INDEX: 36.41 KG/M2 | WEIGHT: 260.1 LBS | HEIGHT: 71 IN | DIASTOLIC BLOOD PRESSURE: 66 MMHG | HEART RATE: 88 BPM | SYSTOLIC BLOOD PRESSURE: 116 MMHG

## 2019-10-15 DIAGNOSIS — I50.32 CHRONIC DIASTOLIC HEART FAILURE (H): ICD-10-CM

## 2019-10-15 DIAGNOSIS — I10 ESSENTIAL HYPERTENSION: ICD-10-CM

## 2019-10-15 DIAGNOSIS — I42.9 CARDIOMYOPATHY, UNSPECIFIED TYPE (H): Primary | ICD-10-CM

## 2019-10-15 DIAGNOSIS — I25.10 CORONARY ARTERY DISEASE INVOLVING NATIVE CORONARY ARTERY OF NATIVE HEART WITHOUT ANGINA PECTORIS: ICD-10-CM

## 2019-10-15 PROCEDURE — 99214 OFFICE O/P EST MOD 30 MIN: CPT | Performed by: PHYSICIAN ASSISTANT

## 2019-10-15 ASSESSMENT — MIFFLIN-ST. JEOR: SCORE: 1936.94

## 2019-10-15 NOTE — PROGRESS NOTES
"Please see separate dictation for HPI, PHYSICAL EXAM AND IMPRESSION/PLAN.    CURRENT MEDICATIONS:  Current Outpatient Medications   Medication Sig Dispense Refill     amLODIPine (NORVASC) 5 MG tablet Take 1 tablet (5 mg) by mouth daily 90 tablet 3     aspirin (ASA) 81 MG EC tablet Take 1 tablet (81 mg) by mouth daily 100 tablet 3     atorvastatin (LIPITOR) 40 MG tablet Take 1 tablet (40 mg) by mouth daily 90 tablet 3     blood glucose (ACCU-CHEK SMARTVIEW) test strip Use to test blood sugar 1 times daily or as directed. 100 strip 3     Ferrous Gluconate 240 (27 Fe) MG TABS Take 1 tablet by mouth daily        finasteride (PROSCAR) 5 MG tablet Take 5 mg by mouth daily.       fluticasone (FLONASE) 50 MCG/ACT spray Spray 1 spray into both nostrils nightly as needed        isosorbide mononitrate (IMDUR) 30 MG 24 hr tablet Take 1 tablet (30 mg) by mouth daily 30 tablet 11     labetalol (NORMODYNE) 200 MG tablet Take 1 tablet (200 mg) by mouth 2 times daily 180 tablet 3     lisinopril (PRINIVIL/ZESTRIL) 10 MG tablet TAKE 3 TABLETS (30 MG) BY MOUTH DAILY 90 tablet 3     loratadine (CLARITIN) 10 MG tablet Take 10 mg by mouth as needed        metFORMIN (GLUCOPHAGE) 1000 MG tablet TAKE 1 TABLET BY MOUTH TWICE A DAY WITH MEALS 180 tablet 1     Multiple Vitamins-Minerals (MULTIVITAMIN ADULTS PO) Take 1 tablet by mouth daily        tamsulosin (FLOMAX) 0.4 MG 24 hr capsule Take 1 capsule by mouth daily.       torsemide (DEMADEX) 20 MG tablet Take 1 tablet (20 mg) by mouth daily 90 tablet 3     clindamycin (CLEOCIN) 300 MG capsule Take 1 capsule (300 mg) by mouth 4 times daily (Patient not taking: Reported on 10/15/2019) 40 capsule 0     silver sulfADIAZINE (SILVADENE) 1 % external cream Apply topically daily (Patient not taking: Reported on 10/15/2019) 25 g 1       ALLERGIES:     Allergies   Allergen Reactions     Penicillins Anaphylaxis     \"anaphylactic\"     Sulfa Drugs      \"itchy rash, swelling of face and hands\"     Ancef " [Cefazolin] Rash       PAST MEDICAL HISTORY:  Past Medical History:   Diagnosis Date     Abnormal stress test 3/4/2019    Added automatically from request for surgery [id number]     Arthritis     osteoarthritis knees     CAD (coronary artery disease)     subtotal occlusion in the small distal LAD      Cardiomyopathy (H)      Cerebral artery occlusion with cerebral infarction (H)     TIA 1993, no residual     Chest pain      Chronic rhinitis      ISBELL (dyspnea on exertion)      Dyspnea 02/17/2019     Edema      HTN (hypertension)      Hyperlipidemia      Hypertrophy of prostate with urinary obstruction and other lower urinary tract symptoms (LUTS)      Nephrolithiasis      Orthopnea      PVD (peripheral vascular disease) (H)      Renal colic      S/P cardiac cath 03/05/2019    subtotal occlusion in the small distal LAD      Sleep apnea     doesn't use cpap     TIA (transient ischaemic attack) 1993     Type 2 diabetes mellitus with peripheral vascular disease (H) 11/8/2018     Unspecified transient cerebral ischemia 1993    No definite source found     Ureteral stone        PAST SURGICAL HISTORY:  Past Surgical History:   Procedure Laterality Date     AMPUTATE TOE(S) Left 10/15/2018    Procedure: AMPUTATE TOE(S);  Left third toe amputation;  Surgeon: Ayaka Azevedo DPM, Podiatry/Foot and Ankle Surgery;  Location: [facility] OR     ARTHROPLASTY KNEE Right 12/7/2018    Procedure: Right total knee arthroplasty;  Surgeon: Issa Cunha MD;  Location: [facility] OR     C NONSPECIFIC PROCEDURE  1985    Diastasis recti repair     C NONSPECIFIC PROCEDURE  1987    Ventral hernia repair     C NONSPECIFIC PROCEDURE      ORIF of left shoulder     COLONOSCOPY  3/9/2013    Procedure: COLONOSCOPY;  COLONOSCOPY;  Surgeon: Chau Hogan MD;  Location:  GI     CV HEART CATHETERIZATION WITH POSSIBLE INTERVENTION Left 3/5/2019    Procedure: Coronary Angiogram;  Surgeon: Nas Linda MD;  Location:  HEART CARDIAC CATH LAB     EYE SURGERY   2017    left eye cataract     FOOT SURGERY  2013    cyst removal      HERNIA REPAIR  2004    ventral        SOCIAL HISTORY:  Social History     Socioeconomic History     Marital status:      Spouse name: None     Number of children: None     Years of education: None     Highest education level: None   Occupational History     Employer: SELF   Social Needs     Financial resource strain: None     Food insecurity:     Worry: None     Inability: None     Transportation needs:     Medical: None     Non-medical: None   Tobacco Use     Smoking status: Former Smoker     Packs/day: 0.00     Last attempt to quit: 3/17/1993     Years since quittin.5     Smokeless tobacco: Never Used   Substance and Sexual Activity     Alcohol use: Not Currently     Drug use: No     Sexual activity: Never   Lifestyle     Physical activity:     Days per week: None     Minutes per session: None     Stress: None   Relationships     Social connections:     Talks on phone: None     Gets together: None     Attends Yarsani service: None     Active member of club or organization: None     Attends meetings of clubs or organizations: None     Relationship status: None     Intimate partner violence:     Fear of current or ex partner: None     Emotionally abused: None     Physically abused: None     Forced sexual activity: None   Other Topics Concern     Parent/sibling w/ CABG, MI or angioplasty before 65F 55M? Not Asked   Social History Narrative     None       FAMILY HISTORY:  Family History   Problem Relation Age of Onset     Family History Negative Mother          86 yo     Cancer Father          76 yo brain     Diabetes Maternal Grandfather          89 yo     Alcohol/Drug Paternal Grandfather                Review of Systems:  Skin:  Positive for   open wound on toe   Eyes:  Positive for glasses cataract extraction of both eyes; reading glasses  ENT:  Positive for hearing loss;sinus  trouble;postnasal drainage    Respiratory:  Positive for dyspnea on exertion;sleep apnea stairs; does not wear CPAP - sleeping on his side   Cardiovascular:    Positive for;edema edema of both lower legs - wearing compression stockings  Gastroenterology: Positive for constipation;diarrhea hx hernia; pain on right side of ribs occ when twisting his torso  Genitourinary:  Positive for nocturia    Musculoskeletal:  Positive for back pain;joint pain right knee replacement; arthritis in left knee  Neurologic:  Negative      Psychiatric:  not assessed      Heme/Lymph/Imm:  Positive for allergies    Endocrine:  Positive for diabetes       Reviewed. Remainder of the note dictated.    Maria Fernanda Patiño PA-C

## 2019-10-15 NOTE — LETTER
10/15/2019    Anh Spivey NP  [address] [facility] [address]    RE: Lance Ibrahim       Dear Colleague,    I had the pleasure of seeing Lance Ibrahim in the [facility].    Please see separate dictation for HPI, PHYSICAL EXAM AND IMPRESSION/PLAN.    CURRENT MEDICATIONS:  Current Outpatient Medications   Medication Sig Dispense Refill     amLODIPine (NORVASC) 5 MG tablet Take 1 tablet (5 mg) by mouth daily 90 tablet 3     aspirin (ASA) 81 MG EC tablet Take 1 tablet (81 mg) by mouth daily 100 tablet 3     atorvastatin (LIPITOR) 40 MG tablet Take 1 tablet (40 mg) by mouth daily 90 tablet 3     blood glucose (ACCU-CHEK SMARTVIEW) test strip Use to test blood sugar 1 times daily or as directed. 100 strip 3     Ferrous Gluconate 240 (27 Fe) MG TABS Take 1 tablet by mouth daily        finasteride (PROSCAR) 5 MG tablet Take 5 mg by mouth daily.       fluticasone (FLONASE) 50 MCG/ACT spray Spray 1 spray into both nostrils nightly as needed        isosorbide mononitrate (IMDUR) 30 MG 24 hr tablet Take 1 tablet (30 mg) by mouth daily 30 tablet 11     labetalol (NORMODYNE) 200 MG tablet Take 1 tablet (200 mg) by mouth 2 times daily 180 tablet 3     lisinopril (PRINIVIL/ZESTRIL) 10 MG tablet TAKE 3 TABLETS (30 MG) BY MOUTH DAILY 90 tablet 3     loratadine (CLARITIN) 10 MG tablet Take 10 mg by mouth as needed        metFORMIN (GLUCOPHAGE) 1000 MG tablet TAKE 1 TABLET BY MOUTH TWICE A DAY WITH MEALS 180 tablet 1     Multiple Vitamins-Minerals (MULTIVITAMIN ADULTS PO) Take 1 tablet by mouth daily        tamsulosin (FLOMAX) 0.4 MG 24 hr capsule Take 1 capsule by mouth daily.       torsemide (DEMADEX) 20 MG tablet Take 1 tablet (20 mg) by mouth daily 90 tablet 3     clindamycin (CLEOCIN) 300 MG capsule Take 1 capsule (300 mg) by mouth 4 times daily (Patient not taking: Reported on 10/15/2019) 40 capsule 0     silver sulfADIAZINE (SILVADENE) 1 % external cream Apply topically daily  "(Patient not taking: Reported on 10/15/2019) 25 g 1       ALLERGIES:     Allergies   Allergen Reactions     Penicillins Anaphylaxis     \"anaphylactic\"     Sulfa Drugs      \"itchy rash, swelling of face and hands\"     Ancef [Cefazolin] Rash       PAST MEDICAL HISTORY:  Past Medical History:   Diagnosis Date     Abnormal stress test 3/4/2019    Added automatically from request for surgery [id number]     Arthritis     osteoarthritis knees     CAD (coronary artery disease)     subtotal occlusion in the small distal LAD      Cardiomyopathy (H)      Cerebral artery occlusion with cerebral infarction (H)     TIA 1993, no residual     Chest pain      Chronic rhinitis      ISBELL (dyspnea on exertion)      Dyspnea 02/17/2019     Edema      HTN (hypertension)      Hyperlipidemia      Hypertrophy of prostate with urinary obstruction and other lower urinary tract symptoms (LUTS)      Nephrolithiasis      Orthopnea      PVD (peripheral vascular disease) (H)      Renal colic      S/P cardiac cath 03/05/2019    subtotal occlusion in the small distal LAD      Sleep apnea     doesn't use cpap     TIA (transient ischaemic attack) 1993     Type 2 diabetes mellitus with peripheral vascular disease (H) 11/8/2018     Unspecified transient cerebral ischemia 1993    No definite source found     Ureteral stone        PAST SURGICAL HISTORY:  Past Surgical History:   Procedure Laterality Date     AMPUTATE TOE(S) Left 10/15/2018    Procedure: AMPUTATE TOE(S);  Left third toe amputation;  Surgeon: Ayaka Azevedo DPM, Podiatry/Foot and Ankle Surgery;  Location: [facility] OR     ARTHROPLASTY KNEE Right 12/7/2018    Procedure: Right total knee arthroplasty;  Surgeon: Issa Cunha MD;  Location: [facility] OR     C NONSPECIFIC PROCEDURE  1985    Diastasis recti repair     C NONSPECIFIC PROCEDURE  1987    Ventral hernia repair     C NONSPECIFIC PROCEDURE      ORIF of left shoulder     COLONOSCOPY  3/9/2013    Procedure: COLONOSCOPY;  COLONOSCOPY;  Surgeon: " Chau Hogan MD;  Location:  GI     CV HEART CATHETERIZATION WITH POSSIBLE INTERVENTION Left 3/5/2019    Procedure: Coronary Angiogram;  Surgeon: Nas Linda MD;  Location:  HEART CARDIAC CATH LAB     EYE SURGERY  2017    left eye cataract     FOOT SURGERY  2013    cyst removal      HERNIA REPAIR  2004    ventral        SOCIAL HISTORY:  Social History     Socioeconomic History     Marital status:      Spouse name: None     Number of children: None     Years of education: None     Highest education level: None   Occupational History     Employer: SELF   Social Needs     Financial resource strain: None     Food insecurity:     Worry: None     Inability: None     Transportation needs:     Medical: None     Non-medical: None   Tobacco Use     Smoking status: Former Smoker     Packs/day: 0.00     Last attempt to quit: 3/17/1993     Years since quittin.5     Smokeless tobacco: Never Used   Substance and Sexual Activity     Alcohol use: Not Currently     Drug use: No     Sexual activity: Never   Lifestyle     Physical activity:     Days per week: None     Minutes per session: None     Stress: None   Relationships     Social connections:     Talks on phone: None     Gets together: None     Attends Anabaptist service: None     Active member of club or organization: None     Attends meetings of clubs or organizations: None     Relationship status: None     Intimate partner violence:     Fear of current or ex partner: None     Emotionally abused: None     Physically abused: None     Forced sexual activity: None   Other Topics Concern     Parent/sibling w/ CABG, MI or angioplasty before 65F 55M? Not Asked   Social History Narrative     None       FAMILY HISTORY:  Family History   Problem Relation Age of Onset     Family History Negative Mother          88 yo     Cancer Father          74 yo brain     Diabetes Maternal Grandfather          89 yo     Alcohol/Drug  Paternal Grandfather                Review of Systems:  Skin:  Positive for   open wound on toe   Eyes:  Positive for glasses cataract extraction of both eyes; reading glasses  ENT:  Positive for hearing loss;sinus trouble;postnasal drainage    Respiratory:  Positive for dyspnea on exertion;sleep apnea stairs; does not wear CPAP - sleeping on his side   Cardiovascular:    Positive for;edema edema of both lower legs - wearing compression stockings  Gastroenterology: Positive for constipation;diarrhea hx hernia; pain on right side of ribs occ when twisting his torso  Genitourinary:  Positive for nocturia    Musculoskeletal:  Positive for back pain;joint pain right knee replacement; arthritis in left knee  Neurologic:  Negative      Psychiatric:  not assessed      Heme/Lymph/Imm:  Positive for allergies    Endocrine:  Positive for diabetes       Reviewed. Remainder of the note dictated.    Maria Fernanda Patiño PA-C    Service Date: 10/15/2019      HISTORY OF PRESENT ILLNESS:  Lance is a pleasant 74-year-old gentleman who presents to the office today for routine followup and to review the results of a recent echocardiogram.      His cardiovascular history includes a mild cardiomyopathy which was first noted in 2018 when he was hospitalized with sepsis.  He subsequently was seen in the Emergency Department in early 2019 with substernal chest discomfort and shortness of breath.  Ultimately he was referred to Cardiology and ended up undergoing coronary angiography which revealed a subtotal occlusion in a small apical LAD with mild to moderate disease in the remainder of the coronary tree, including moderate left main disease which was borderline significant by iFR.  Medical management was recommended.  During his cardiac catheterization, he was noted to have severe hypertension for which he received numerous IV medications as well as a very elevated LV filling pressure with an LVEDP of 32 mmHg.  Multiple changes  were made to his antihypertensive therapy.  Additionally, he was started on diuretic therapy, initially at torsemide 40 mg daily, which eventually was switched to 20 mg daily.  He was last in to see Dr. Martines in May.  At that time, his atorvastatin was increased due to his underlying coronary disease.  He was asked to follow up today with a repeat echocardiogram.      At this time, Lance states that he is overall doing okay.  He has stable dyspnea on exertion which is unchanged.  He has stable lower extremity edema which is unchanged.  It is better in the morning and worse at night.  He continues on torsemide 20 mg daily and feels as though he is doing well on this dose and able to tolerate the frequency associated with 20 mg, whereas he was not tolerating the urinary frequency on the 40 mg daily.  He really does not have any new cardiac questions or concerns today.      We reviewed the results of his recent echocardiogram that showed low-normal LV systolic function with an EF of 50%-55%.  There was borderline right ventricular enlargement with mildly elevated RVSP consistent with mild pulmonary hypertension.  The reader mentions that compared to his last echocardiogram, his EF had improved slightly and that the RVSP now was mildly increased.  He also had a basic metabolic panel performed.  His sodium is 140, potassium is 4.4, BUN is 30, creatinine is 1.43, which appears overall consistent with his baseline.      CURRENT CARDIAC MEDICATIONS:   1.  Amlodipine 5 mg daily.   2.  Aspirin 81 mg daily.   3.  Atorvastatin 40 mg daily.   4.  Imdur 30 mg daily.   5.  Labetalol 200 mg b.i.d.   6.  Lisinopril 10 mg 3 tablets daily.   7.  Torsemide 20 mg daily.      The remainder of his medications, allergies and review of systems were reviewed and as are documented separately.      PHYSICAL EXAMINATION:   GENERAL:  The patient is a pleasant 74-year-old gentleman who appears his stated age.  He is in no apparent distress.    VITAL SIGNS:  His blood pressure is 116/66, pulse is 88, weight is 260 pounds.   PULMONARY:  Breathing is nonlabored.  Lungs are clear to auscultation.   CARDIAC:  Examination reveals a regular rate and rhythm.  No significant murmur is appreciated.   EXTREMITIES:  Lower extremities show 2+ pitting edema to the knees bilaterally.   NEUROLOGIC:  Alert and oriented.      ASSESSMENT AND PLAN:  Mr. Ibrahim is a pleasant 74-year-old gentleman who has a history of a mild cardiomyopathy and coronary artery disease as well as hypertension and heart failure with preserved ejection fraction as detailed above.  His most recent echocardiogram shows a slight improvement in his LV function.  He does have mildly elevated RVSP and RV dilatation which is likely secondary to his underlying sleep apnea and obesity (I do not believe that the patient wears the CPAP).  Overall, he appears stable from a cardiac standpoint.  His blood pressure appears well controlled.  We will continue medical therapy for his underlying coronary artery disease.  His atorvastatin was increased earlier this year, and we will plan to repeat a fasting lipid profile prior to his followup with Dr. Martines in the spring.      He will continue his current medical therapy and plan to follow up with Dr. Martines in May of next year.  Of course, I encouraged him to contact us in the interim with any questions or concerns.         MARIA FERNANDA GILLIAM PA-C             D: 10/15/2019   T: 10/15/2019   MT: MARTA      Name:     CURTIS IBRAHIM   MRN:      [id number]        Account:      [id number]   :      1945           Service Date: 10/15/2019      Document: [id number]       Thank you for allowing me to participate in the care of your patient.      Sincerely,     Maria Fernanda Gilliam PA-C     [facility]    cc:   Lars Martines MD  [address]  [place] [address]

## 2019-10-15 NOTE — PATIENT INSTRUCTIONS
Thank you for your [facility] visit today. Your provider has recommended the following:  Medication Changes:  No changes at this time  Recommendations:  Repeat blood work before your next appt  Follow-up:  See Dr Martines for cardiology follow up in late spring 2020.    Reminder:  Please bring in your current medication list or your medication, over the counter supplements and vitamin bottles as we will review these at each office visit.

## 2019-10-15 NOTE — LETTER
10/15/2019      Anh Spivey NP  [address] [facility] [address]      RE: Lance Ibrahim       Dear Colleague,    I had the pleasure of seeing Lance Ibrahim in the [facility].    Service Date: 10/15/2019      HISTORY OF PRESENT ILLNESS:  Lance is a pleasant 74-year-old gentleman who presents to the office today for routine followup and to review the results of a recent echocardiogram.      His cardiovascular history includes a mild cardiomyopathy which was first noted in 2018 when he was hospitalized with sepsis.  He subsequently was seen in the Emergency Department in early 2019 with substernal chest discomfort and shortness of breath.  Ultimately he was referred to Cardiology and ended up undergoing coronary angiography which revealed a subtotal occlusion in a small apical LAD with mild to moderate disease in the remainder of the coronary tree, including moderate left main disease which was borderline significant by iFR.  Medical management was recommended.  During his cardiac catheterization, he was noted to have severe hypertension for which he received numerous IV medications as well as a very elevated LV filling pressure with an LVEDP of 32 mmHg.  Multiple changes were made to his antihypertensive therapy.  Additionally, he was started on diuretic therapy, initially at torsemide 40 mg daily, which eventually was switched to 20 mg daily.  He was last in to see Dr. Martines in May.  At that time, his atorvastatin was increased due to his underlying coronary disease.  He was asked to follow up today with a repeat echocardiogram.      At this time, Lance states that he is overall doing okay.  He has stable dyspnea on exertion which is unchanged.  He has stable lower extremity edema which is unchanged.  It is better in the morning and worse at night.  He continues on torsemide 20 mg daily and feels as though he is doing well on this dose and able to tolerate the  frequency associated with 20 mg, whereas he was not tolerating the urinary frequency on the 40 mg daily.  He really does not have any new cardiac questions or concerns today.      We reviewed the results of his recent echocardiogram that showed low-normal LV systolic function with an EF of 50%-55%.  There was borderline right ventricular enlargement with mildly elevated RVSP consistent with mild pulmonary hypertension.  The reader mentions that compared to his last echocardiogram, his EF had improved slightly and that the RVSP now was mildly increased.  He also had a basic metabolic panel performed.  His sodium is 140, potassium is 4.4, BUN is 30, creatinine is 1.43, which appears overall consistent with his baseline.      CURRENT CARDIAC MEDICATIONS:   1.  Amlodipine 5 mg daily.   2.  Aspirin 81 mg daily.   3.  Atorvastatin 40 mg daily.   4.  Imdur 30 mg daily.   5.  Labetalol 200 mg b.i.d.   6.  Lisinopril 10 mg 3 tablets daily.   7.  Torsemide 20 mg daily.      The remainder of his medications, allergies and review of systems were reviewed and as are documented separately.      PHYSICAL EXAMINATION:   GENERAL:  The patient is a pleasant 74-year-old gentleman who appears his stated age.  He is in no apparent distress.   VITAL SIGNS:  His blood pressure is 116/66, pulse is 88, weight is 260 pounds.   PULMONARY:  Breathing is nonlabored.  Lungs are clear to auscultation.   CARDIAC:  Examination reveals a regular rate and rhythm.  No significant murmur is appreciated.   EXTREMITIES:  Lower extremities show 2+ pitting edema to the knees bilaterally.   NEUROLOGIC:  Alert and oriented.      ASSESSMENT AND PLAN:  Mr. Ibrahim is a pleasant 74-year-old gentleman who has a history of a mild cardiomyopathy and coronary artery disease as well as hypertension and heart failure with preserved ejection fraction as detailed above.  His most recent echocardiogram shows a slight improvement in his LV function.  He does have mildly  elevated RVSP and RV dilatation which is likely secondary to his underlying sleep apnea and obesity (I do not believe that the patient wears the CPAP).  Overall, he appears stable from a cardiac standpoint.  His blood pressure appears well controlled.  We will continue medical therapy for his underlying coronary artery disease.  His atorvastatin was increased earlier this year, and we will plan to repeat a fasting lipid profile prior to his followup with Dr. Martines in the spring.      He will continue his current medical therapy and plan to follow up with Dr. Martines in May of next year.  Of course, I encouraged him to contact us in the interim with any questions or concerns.         GABRIELLE GILLIAM PA-C             D: 10/15/2019   T: 10/15/2019   MT: MARTA      Name:     CURTIS SAHU   MRN:      [id number]        Account:      [id number]   :      1945           Service Date: 10/15/2019      Document: [id number]         Outpatient Encounter Medications as of 10/15/2019   Medication Sig Dispense Refill     amLODIPine (NORVASC) 5 MG tablet Take 1 tablet (5 mg) by mouth daily 90 tablet 3     aspirin (ASA) 81 MG EC tablet Take 1 tablet (81 mg) by mouth daily 100 tablet 3     atorvastatin (LIPITOR) 40 MG tablet Take 1 tablet (40 mg) by mouth daily 90 tablet 3     blood glucose (ACCU-CHEK SMARTVIEW) test strip Use to test blood sugar 1 times daily or as directed. 100 strip 3     Ferrous Gluconate 240 (27 Fe) MG TABS Take 1 tablet by mouth daily        finasteride (PROSCAR) 5 MG tablet Take 5 mg by mouth daily.       fluticasone (FLONASE) 50 MCG/ACT spray Spray 1 spray into both nostrils nightly as needed        isosorbide mononitrate (IMDUR) 30 MG 24 hr tablet Take 1 tablet (30 mg) by mouth daily 30 tablet 11     labetalol (NORMODYNE) 200 MG tablet Take 1 tablet (200 mg) by mouth 2 times daily 180 tablet 3     lisinopril (PRINIVIL/ZESTRIL) 10 MG tablet TAKE 3 TABLETS (30 MG) BY MOUTH DAILY 90  tablet 3     loratadine (CLARITIN) 10 MG tablet Take 10 mg by mouth as needed        metFORMIN (GLUCOPHAGE) 1000 MG tablet TAKE 1 TABLET BY MOUTH TWICE A DAY WITH MEALS 180 tablet 1     Multiple Vitamins-Minerals (MULTIVITAMIN ADULTS PO) Take 1 tablet by mouth daily        tamsulosin (FLOMAX) 0.4 MG 24 hr capsule Take 1 capsule by mouth daily.       torsemide (DEMADEX) 20 MG tablet Take 1 tablet (20 mg) by mouth daily 90 tablet 3     clindamycin (CLEOCIN) 300 MG capsule Take 1 capsule (300 mg) by mouth 4 times daily (Patient not taking: Reported on 10/15/2019) 40 capsule 0     silver sulfADIAZINE (SILVADENE) 1 % external cream Apply topically daily (Patient not taking: Reported on 10/15/2019) 25 g 1     No facility-administered encounter medications on file as of 10/15/2019.        Again, thank you for allowing me to participate in the care of your patient.      Sincerely,    Maria Fernanda Patiño PA-C     [facility]

## 2019-10-16 NOTE — PROGRESS NOTES
Service Date: 10/15/2019      HISTORY OF PRESENT ILLNESS:  Lance is a pleasant 74-year-old gentleman who presents to the office today for routine followup and to review the results of a recent echocardiogram.      His cardiovascular history includes a mild cardiomyopathy which was first noted in 2018 when he was hospitalized with sepsis.  He subsequently was seen in the Emergency Department in early 2019 with substernal chest discomfort and shortness of breath.  Ultimately he was referred to Cardiology and ended up undergoing coronary angiography which revealed a subtotal occlusion in a small apical LAD with mild to moderate disease in the remainder of the coronary tree, including moderate left main disease which was borderline significant by iFR.  Medical management was recommended.  During his cardiac catheterization, he was noted to have severe hypertension for which he received numerous IV medications as well as a very elevated LV filling pressure with an LVEDP of 32 mmHg.  Multiple changes were made to his antihypertensive therapy.  Additionally, he was started on diuretic therapy, initially at torsemide 40 mg daily, which eventually was switched to 20 mg daily.  He was last in to see Dr. Martines in May.  At that time, his atorvastatin was increased due to his underlying coronary disease.  He was asked to follow up today with a repeat echocardiogram.      At this time, Lance states that he is overall doing okay.  He has stable dyspnea on exertion which is unchanged.  He has stable lower extremity edema which is unchanged.  It is better in the morning and worse at night.  He continues on torsemide 20 mg daily and feels as though he is doing well on this dose and able to tolerate the frequency associated with 20 mg, whereas he was not tolerating the urinary frequency on the 40 mg daily.  He really does not have any new cardiac questions or concerns today.      We reviewed the results of his recent  echocardiogram that showed low-normal LV systolic function with an EF of 50%-55%.  There was borderline right ventricular enlargement with mildly elevated RVSP consistent with mild pulmonary hypertension.  The reader mentions that compared to his last echocardiogram, his EF had improved slightly and that the RVSP now was mildly increased.  He also had a basic metabolic panel performed.  His sodium is 140, potassium is 4.4, BUN is 30, creatinine is 1.43, which appears overall consistent with his baseline.      CURRENT CARDIAC MEDICATIONS:   1.  Amlodipine 5 mg daily.   2.  Aspirin 81 mg daily.   3.  Atorvastatin 40 mg daily.   4.  Imdur 30 mg daily.   5.  Labetalol 200 mg b.i.d.   6.  Lisinopril 10 mg 3 tablets daily.   7.  Torsemide 20 mg daily.      The remainder of his medications, allergies and review of systems were reviewed and as are documented separately.      PHYSICAL EXAMINATION:   GENERAL:  The patient is a pleasant 74-year-old gentleman who appears his stated age.  He is in no apparent distress.   VITAL SIGNS:  His blood pressure is 116/66, pulse is 88, weight is 260 pounds.   PULMONARY:  Breathing is nonlabored.  Lungs are clear to auscultation.   CARDIAC:  Examination reveals a regular rate and rhythm.  No significant murmur is appreciated.   EXTREMITIES:  Lower extremities show 2+ pitting edema to the knees bilaterally.   NEUROLOGIC:  Alert and oriented.      ASSESSMENT AND PLAN:  Mr. Ibrahim is a pleasant 74-year-old gentleman who has a history of a mild cardiomyopathy and coronary artery disease as well as hypertension and heart failure with preserved ejection fraction as detailed above.  His most recent echocardiogram shows a slight improvement in his LV function.  He does have mildly elevated RVSP and RV dilatation which is likely secondary to his underlying sleep apnea and obesity (I do not believe that the patient wears the CPAP).  Overall, he appears stable from a cardiac standpoint.  His blood  pressure appears well controlled.  We will continue medical therapy for his underlying coronary artery disease.  His atorvastatin was increased earlier this year, and we will plan to repeat a fasting lipid profile prior to his followup with Dr. Martines in the spring.      He will continue his current medical therapy and plan to follow up with Dr. Martines in May of next year.  Of course, I encouraged him to contact us in the interim with any questions or concerns.         GABRIELLE GILLIAM PA-C             D: 10/15/2019   T: 10/15/2019   MT: MARTA      Name:     CURTIS SAHU   MRN:      [id number]        Account:      [id number]   :      1945           Service Date: 10/15/2019      Document: [id number]

## 2019-10-28 DIAGNOSIS — I10 ESSENTIAL HYPERTENSION: ICD-10-CM

## 2019-10-28 NOTE — TELEPHONE ENCOUNTER
"Requested Prescriptions   Pending Prescriptions Disp Refills     lisinopril (PRINIVIL/ZESTRIL) 10 MG tablet  Last Written Prescription Date:  5/16/19  Last Fill Quantity: 90,  # refills: 3   Last office visit: 7/25/2019 with prescribing provider:  Nena   Future Office Visit:   Next 5 appointments (look out 90 days)    Oct 30, 2019  4:30 PM CDT  Return Visit with Ayaka Azevedo DPM, Podiatry/Foot and Ankle Surgery  [facility] ([facility] [address] [facility] [address] [phone]          90 tablet 3     Sig: Take 3 tablets (30 mg) by mouth daily       ACE Inhibitors (Including Combos) Protocol Failed - 10/28/2019  8:15 AM        Failed - Normal serum creatinine on file in past 12 months     Recent Labs   Lab Test 10/11/19  0804   CR 1.43*             Passed - Blood pressure under 140/90 in past 12 months     BP Readings from Last 3 Encounters:   10/15/19 116/66   08/23/19 110/60   07/25/19 (!) 88/52                 Passed - Recent (12 mo) or future (30 days) visit within the authorizing provider's specialty     Patient has had an office visit with the authorizing provider or a provider within the authorizing providers department within the previous 12 mos or has a future within next 30 days. See \"Patient Info\" tab in inbasket, or \"Choose Columns\" in Meds & Orders section of the refill encounter.              Passed - Medication is active on med list        Passed - Patient is age 18 or older        Passed - Normal serum potassium on file in past 12 months     Recent Labs   Lab Test 10/11/19  0804   POTASSIUM 4.4               "

## 2019-10-29 ENCOUNTER — HEALTH MAINTENANCE LETTER (OUTPATIENT)
Age: 74
End: 2019-10-29

## 2019-10-29 RX ORDER — LISINOPRIL 10 MG/1
30 TABLET ORAL DAILY
Qty: 90 TABLET | Refills: 1 | Status: SHIPPED | OUTPATIENT
Start: 2019-10-29 | End: 2019-10-31

## 2019-10-30 ENCOUNTER — OFFICE VISIT (OUTPATIENT)
Dept: PODIATRY | Facility: CLINIC | Age: 74
End: 2019-10-30
Payer: COMMERCIAL

## 2019-10-30 VITALS
WEIGHT: 260 LBS | DIASTOLIC BLOOD PRESSURE: 62 MMHG | BODY MASS INDEX: 36.4 KG/M2 | SYSTOLIC BLOOD PRESSURE: 118 MMHG | HEIGHT: 71 IN

## 2019-10-30 DIAGNOSIS — M20.11 HAV (HALLUX ABDUCTO VALGUS), RIGHT: ICD-10-CM

## 2019-10-30 DIAGNOSIS — L97.512 DIABETIC ULCER OF TOE OF RIGHT FOOT ASSOCIATED WITH TYPE 2 DIABETES MELLITUS, WITH FAT LAYER EXPOSED (H): ICD-10-CM

## 2019-10-30 DIAGNOSIS — R60.0 EDEMA OF BOTH LOWER EXTREMITIES: ICD-10-CM

## 2019-10-30 DIAGNOSIS — E11.621 DIABETIC ULCER OF TOE OF RIGHT FOOT ASSOCIATED WITH TYPE 2 DIABETES MELLITUS, WITH FAT LAYER EXPOSED (H): ICD-10-CM

## 2019-10-30 DIAGNOSIS — M20.12 HAV (HALLUX ABDUCTO VALGUS), LEFT: ICD-10-CM

## 2019-10-30 DIAGNOSIS — E11.42 DIABETIC POLYNEUROPATHY ASSOCIATED WITH TYPE 2 DIABETES MELLITUS (H): Primary | ICD-10-CM

## 2019-10-30 PROCEDURE — 99213 OFFICE O/P EST LOW 20 MIN: CPT | Mod: 25 | Performed by: PODIATRIST

## 2019-10-30 PROCEDURE — 11042 DBRDMT SUBQ TIS 1ST 20SQCM/<: CPT | Performed by: PODIATRIST

## 2019-10-30 ASSESSMENT — MIFFLIN-ST. JEOR: SCORE: 1936.48

## 2019-10-30 NOTE — PATIENT INSTRUCTIONS
"Thank you for choosing [facility]!    DR. [facility]HA[facility]'S CLIN[facility] SCHEDULE  MONDAY AM - [place] TUESDAY - [place]   [address] [address] [place] [address] [place] [address]   [phone] / [phone] / [phone]       WEDNESDAY - [place] FRIDAY AM - WOUND CENTER   [address]   [place] [address] [place] [address]   [phone] / [phone]       FRIDAY PM - [place] SCHEDULE SURGERY: [phone]   [address] BILLING QUESTIONS: [phone]   [place] [address] AFTER HOURS: [phone] / [phone] APPOINTMENTS: [phone]     Consumer Price Line (CPL) [phone]       DIABETES AND YOUR FEET  Diabetes can result in several problems in the feet including ulcers (open sores) and amputations. Two of the most important reasons why people develop foot problems when they have diabetes is : 1. Neuropathy (loss of feeling)  2. Vascular disease (loss or decrease of blood flow).    Neuropathy is a term used to describe a loss of nerve function.  Patients with diabetes are at risk of developing neuropathy if their sugars continue to run high and are above the normal value. One theory for neuropathy is that the \"extra\" sugar in the body enters the nerves and is broken down. These by-products build up in the nerve causing it to swell and impairing nerve function. Often times, this can be prevented by controlling your sugars, dieting and exercise.    When a person develops neuropathy, they usually begin to feel numbness or tingling in their feet and sometime in their legs.  Other symptoms may include painful burning or hot feet, tingling or feeling like insects or ants are crawling on your feet or legs.  If the diabetes is sever and the sugars run high for long periods of time, neuropathy can also occur in the hands.    Vascular disease  is a term used to describe a " loss or decrease in circulation (blood flow). There is a problem in getting blood and oxygen to areas that need it. Similar to neuropathy, sugars can build up in the walls of the arteries (blood vessels) and cause them to become swollen, thickened and hardened. This decreases the amount of blood that can go to an area that needs it. Though this is common in the legs of diabetic patients, it can also affect other arteries (blood vessels) in the body such as in the heart and eyes.    In the legs, vascular disease usually results in cramping. Patients who develop leg cramps after walking the same distance every time (i.e. One block, half a mile, ect.) need to let their doctors know so that their circulation may be checked. Cramps causing severe pain in the feet and/or legs while sleeping and the cramps go away when you stand or hang your legs off the side of the bed, may also be a sign of poor blood circulation.  Occasional cramping in cold weather or on rare occasions with activity may not be due to poor circulation, but you should inform your doctor.    PREVENTION OF THESE DISEASES  The key to prevention is good blood sugar control. Poor blood sugar control is a big reason many of these problems start. Physical activity (exercise) is a very good way to help decrease your blood sugars. Exercise can lower your blood sugar, blood pressure, and cholesterol. It also reduces your risk for heart disease and stroke, relieves stress, and strengthens your heart, muscles and bones.  In addition, regular activity helps insulin work better, improves your blood circulation, and keeps your joints flexible. If you're trying to lose weight, a combination of exercise and wise food choices can help you reach your target weight and maintain it.      PAIN MANAGEMENT  1.Blood Sugar Control - Most important  2. Medications such as:  Amytriptylline, duloxetine, gabapentin, lyrica, tramadol  3. Nutritional therapy:  Vitamin B6 (100mg daily),  Vitamin B12 (75mcg daily), Vitamin D 2000 IU daily), Alpha-Lipoic Acid (600-1800mg daily), Acetyl-L-Carnitine (500-1000mg TID, L-methyl folate (1500mcg daily)    ** Metformin can block Vitamin B6 and B12 so it is important to supplement**    FOOT CARE RECOMMENDATIONS   1. Wash your feet with lukewarm water and a mild soap and then dry them thoroughly, especially between the toes.     2. Examine your feet daily looking for cuts, corns, blisters, cracks, ect, especially after wearing new shoes. Make sure to look between your toes. If you cannot see the bottom of your feet, set a mirror on the floor and hold your foot over it, or ask a spouse, friend or family member to examine your feet for you. Contact your doctor immediately if new problems are noted or if sores are not healing.     3. Immediately apply moisturizer to the tops and bottoms of your feet, avoiding areas between the toes. Hand lotion (Intesive Care, Marta, Eucerin, Neutrogena, Curel, ect) is sufficient unless your doctor prescribes a medicated lotion. Apply sunscreen to your feet when going swimming outside.     4. Use clean comfortable shoes, wear white socks (if you have any bleeding or drainage, you will see it on white socks). Socks should not have thick seams or cut off the circulation around the leg. Break in new shoes slowly and rotate with older shoes until broken in. Check the inside of your shoes with your hand to look for areas of irritation or objects that may have fallen into your shoes.       5. Keep slippers by the side of your bed for use during the night.     6.  Shoes should be fitted by a professional and should not cause areas of irritation.  Check your feet regularly when wearing a new pair of shoes and replace them as needed.     7.  Talk to your doctor about proper exercise. Exercise and stretching stimulate blood flow to your feet and maintain proper glucose levels.     8.  Monitor your blood glucose level as instructed by your  doctor. Notify your doctor immediately if your blood sugar is abnormally high or low.    9. Cut your nails straight across, but then gently round any sharp edges with a cardboard nail file. If you have neuropathy, peripheral vascular disease or cannot see that well to trim your own toenails contact [employer] ([phone]) or [employer] ([phone]).      THINGS TO AVOID DOING   1.  Do not soak your feet if you have an open sore. Use only lukewarm water and always check the temperature with your hand as hot water can easily burn your feet.       2.  Never use a hot water bottle or heating pad on your feet. Also do not apply cold compresses to your feet. With decreased sensation, you could burn or freeze your feet.       3.  Do not apply any of these to your feet:    -  Over the counter medicine for corns or warts    -  Harsh chemicals like boric acid    -  Do not self-treat corns, cuts, blisters or infections. Always consult your doctor.       4.  Do not wear sandals, slippers or walk barefoot, especially on hot sand or concrete or other harsh surfaces.     5.  If you smoke, stop!!!          FOOT ULCER (WOUND) EDUCATION  Ulceration ofthe foot involves a break or hole in the skin. Skin is our best protection against infection. Skin is quite durable, however, the underlying tissues are fragile. For this reason, the wound is likely to deepen rapidly. Deep wounds usually get infected and require amputation. Prompt healing is therefore essential to avoid limb loss.     Foot ulcers do not heal without intervention. Walking on the foot and living your normal life is not typically compatible with healing the sore. Successful healing will require several months of significant alteration of your daily activities.   Ulcer complications frequently develop. This primarily includes infection of skin, which then spreads deep into your joints, bones and tendons. Spreading infection may travel up your leg and into other parts  of your body. Deep infection is usually treated with amputation ofpart ofyour foot or your leg. Signs of infection include fever, chills, nausea, vomiting, erratic blood sugars, local redness, pus, strong odor and localized warmth. Signs of infection should be taken seriously. Prompt evaluation in the clinic or hospital emergency room is required.   Ulcer treatment requires debridement or surgical removal of devitalized tissue. Your doctor will trim away callused, moistened, unhealthy tissue from the wound surface and margin. This helps to clean the wound and allows proper inspection. Debridement also stimulates healing even though the wound originally appears larger. Expect some bleeding with each debridement. You will be given instruction regarding wound bandaging. This often includes ointment and gauze. Avoid tape directly on the skin. Hand washing is essential since most infections will come from your fingertips. Ulcer care requires a no touch technique. Your fingers should not touch the margin or base of the wound.    HELPFUL HEALING TIPS:  1. Debridement: Getting rid of bad tissue makes way for good tissue to promote healing  2. Addressing Foot Deformities: Hammertoes and bunions can cause increased pressure  3. Pressure Reduction: If pressure remains to the wound, it won't heal  4. Good Pulses: If bloodflow is not getting to the foot, the ulcer will not heal  5. Good Nutrition: If you are not getting proper nutrition your body can't heal.Protein!  6. Infection Control: Keep the ulcer clean with wound cleanser. DO NOT SOAK IT!  7. Moisture Control: Keep edema down and make sure that drainage is getting pulled away from the ulcer    IMPORTANCE OF DEBRIDEMENT    Reduces bioburden to help control or reduce infection. Even if an ulcer is not  infected,  the bacterial bioburden causes increased local inflammation.    Allows more accurate visualization of the wound base and edges, which allows for more precise  staging.    Removes necrotic/non-viable tissue, which impedes wound healing, causes protein loss and can be a nidus for infection.    Stimulates new circulation (angiogenesis) and allows adequate oxygen delivery to the wound.    Removes undermining and tunneling, and may help reduce abscess formation.    Releases healing growth factors at the edge of the wound.    Prepares the wound bed by leaving only tissues that are capable of regenerating.          BODY WEIGHT AND YOUR FEET  The following information is included in the after visit summary for all patients. Body weight can be a sensitive issue to discuss in clinic, but we think the following information is very important. Although we focus on the feet and ankles, we do support the overall health of our patients.     Many things can cause foot and ankle problems. Foot structure, activity level, foot mechanics and injuries are common causes of pain. One very important issue that often goes unmentioned, is body weight. Extra weight can cause increased stress on muscles, ligaments, bones and tendons. Sometimes just a few extra pounds is all it takes to put one over her/his threshold. Without reducing that stress, it can be difficult to alleviate pain. As Foot & Ankle specialists, our job is addressing the lower extremity problem and possible causes. Regarding extra body weight, we encourage patients to discuss diet and weight management plans with their primary care doctors. It is this team approach that gives you the best opportunity for pain relief and getting you back on your feet.      [facility] has a Comprehensive Weight Management Program. This program includes counseling, education, non-surgical and surgical approaches to weight loss. If you are interested in learning more either talk to you primary care provider or call [phone].

## 2019-10-30 NOTE — PROGRESS NOTES
"Podiatry / Foot and Ankle Surgery Progress Note    October 30, 2019    Subject: Patient was seen for pain to left great toe. Wondering if there is a ganglionic cyst again. Had one removed years ago. Will get pain intermittently. Mostly at night. Also notes he thinks the right toe ulcer is healing. Denies fever, chills nausa. Denies injury.     Objective:  Vitals: /62   Ht 1.803 m (5' 11\")   Wt 117.9 kg (260 lb)   BMI 36.26 kg/m      A1C: 6.5 (3/2019)     General:  Patient is alert and orientated.  NAD     Vascular:  DP and PT pulses are palpable.  Edema and varicosities noted.  CFT's < 3secs.  Skin temp is normal      Neuro:  Light and gross touch sensation is absent to feet.      Derm:  Full thickness ulcer to the plantar right interphalangeal joint measures 1.2cm x 0.4cm x 0.2cm after debridement.  base of wound is granular. No redness, purulent drainage or malodor noted.      Musculoskeletal:  Decrease arch height. Lateral deviation of the right and left halluxes.     Assessment:    Diabetic polyneuropathy associated with type 2 diabetes mellitus (H)  Ulcer of right foot with fat layer exposed (H)  Hav (hallux abducto valgus), right  Cellulitis and abscess of toe of right foot  Edema of both lower extremities due to peripheral venous insufficiency     Plan:  Wound was debrided. Patient not in the boot that was recommended. In regular tennis shoes without offloading. Discussed that we need to get pressure off the area for the wound to heal. talked about diabetic shoes and inserts. He declined. Insert was modified with felted foam to try to get pressure off of the area. Continue silvadene dressing changes daily. Recommend compression, was given tensogrip.     Clinically did not palpate a mass of the left foot.  I think it is more his peripheral neuropathy that he is feeling.  Recommend topical lidocaine patches. Talked about MRI to assess for mass but patient declined.     Follow up in 4 weeks. If he " develops systemic signs of infection, recommend going to Emergency department.      Procedure: After verbal consent, excisional debridement was performed on ulcer.  #15 blade was used to debride ulcer down to and including subcutaneous tissue. Bleeding controlled with light pressure.   No drainage noted.  No anesthesia was used due to neuropathy. Dry dressing applied to foot.  Patient tolerated procedure well.    Ayaka Azevedo DPM, Podiatry/Foot and Ankle Surgery    Weight management plan: Patient was referred to their PCP to discuss a diet and exercise plan.    Recommended to Lance Ibrahim to follow up with Primary Care provider regarding elevated blood pressure.

## 2019-10-30 NOTE — LETTER
"    10/30/2019         RE: Lance Ibrahim  [address] [place] [address]        Dear Colleague,    Thank you for referring your patient, Lance Ibrahim, to the [facility]. Please see a copy of my visit note below.    Podiatry / Foot and Ankle Surgery Progress Note    October 30, 2019    Subject: Patient was seen for pain to left great toe. Wondering if there is a ganglionic cyst again. Had one removed years ago. Will get pain intermittently. Mostly at night. Also notes he thinks the right toe ulcer is healing. Denies fever, chills nausa. Denies injury.     Objective:  Vitals: /62   Ht 1.803 m (5' 11\")   Wt 117.9 kg (260 lb)   BMI 36.26 kg/m       A1C: 6.5 (3/2019)     General:  Patient is alert and orientated.  NAD     Vascular:  DP and PT pulses are palpable.  Edema and varicosities noted.  CFT's < 3secs.  Skin temp is normal      Neuro:  Light and gross touch sensation is absent to feet.      Derm:  Full thickness ulcer to the plantar right interphalangeal joint measures 1. 2cm x 0.4cm x 0.2cm after debridement.  base of wound is granular. No redness, purulent drainage or malodor noted.      Musculoskeletal:  Decrease arch height. Lateral deviation of the right and left halluxes.     Assessment:    Diabetic polyneuropathy associated with type 2 diabetes mellitus (H)  Ulcer of right foot with fat layer exposed (H)  Hav (hallux abducto valgus), right  Cellulitis and abscess of toe of right foot  Edema of both lower extremities due to peripheral venous insufficiency     Plan:  Wound was debrided. Patient not in the boot that was recommended. In regular tennis shoes without offloading. Discussed that we need to get pressure off the area for the wound to heal. talked about diabetic shoes and inserts. He declined. Insert was modified with felted foam to try to get pressure off of the area. Continue silvadene dressing changes daily. Recommend compression, was given tensogrip. "     Clinically did not palpate a mass of the left foot.  I think it is more his peripheral neuropathy that he is feeling.  Recommend topical lidocaine patches. Talked about MRI to assess for mass but patient declined.     Follow up in 4 weeks. If he develops systemic signs of infection, recommend going to Emergency department.      Procedure: After verbal consent, excisional debridement was performed on ulcer.  #15 blade was used to debride ulcer down to and including subcutaneous tissue. Bleeding controlled with light pressure.   No drainage noted.  No anesthesia was used due to neuropathy. Dry dressing applied to foot.  Patient tolerated procedure well.    Ayaka Azevedo DPM, Podiatry/Foot and Ankle Surgery    Weight management plan: Patient was referred to their PCP to discuss a diet and exercise plan.    Recommended to Lance Ibrahim to follow up with Primary Care provider regarding elevated blood pressure.      Again, thank you for allowing me to participate in the care of your patient.        Sincerely,        Ayaka Azevedo DPM, Podiatry/Foot and Ankle Surgery

## 2019-10-31 DIAGNOSIS — I10 ESSENTIAL HYPERTENSION: ICD-10-CM

## 2019-10-31 RX ORDER — LISINOPRIL 10 MG/1
30 TABLET ORAL DAILY
Qty: 270 TABLET | Refills: 0 | Status: SHIPPED | OUTPATIENT
Start: 2019-10-31 | End: 2020-02-18

## 2019-11-28 ENCOUNTER — APPOINTMENT (OUTPATIENT)
Dept: GENERAL RADIOLOGY | Facility: CLINIC | Age: 74
End: 2019-11-28
Attending: EMERGENCY MEDICINE
Payer: COMMERCIAL

## 2019-11-28 ENCOUNTER — HOSPITAL ENCOUNTER (EMERGENCY)
Facility: CLINIC | Age: 74
Discharge: HOME | End: 2019-11-28
Attending: EMERGENCY MEDICINE | Admitting: EMERGENCY MEDICINE
Payer: COMMERCIAL

## 2019-11-28 VITALS
TEMPERATURE: 97.6 F | RESPIRATION RATE: 18 BRPM | OXYGEN SATURATION: 98 % | DIASTOLIC BLOOD PRESSURE: 79 MMHG | SYSTOLIC BLOOD PRESSURE: 168 MMHG

## 2019-11-28 DIAGNOSIS — M10.9 ACUTE GOUTY ARTHRITIS: ICD-10-CM

## 2019-11-28 LAB
ANION GAP SERPL CALCULATED.3IONS-SCNC: 7 MMOL/L (ref 3–14)
BASOPHILS # BLD AUTO: 0.1 10E9/L (ref 0–0.2)
BASOPHILS NFR BLD AUTO: 0.7 %
BUN SERPL-MCNC: 31 MG/DL (ref 7–30)
CALCIUM SERPL-MCNC: 9.1 MG/DL (ref 8.5–10.1)
CHLORIDE SERPL-SCNC: 110 MMOL/L (ref 94–109)
CO2 SERPL-SCNC: 24 MMOL/L (ref 20–32)
CREAT SERPL-MCNC: 1.5 MG/DL (ref 0.66–1.25)
CRP SERPL-MCNC: 4.4 MG/L (ref 0–8)
DIFFERENTIAL METHOD BLD: ABNORMAL
EOSINOPHIL # BLD AUTO: 0.2 10E9/L (ref 0–0.7)
EOSINOPHIL NFR BLD AUTO: 2.5 %
ERYTHROCYTE [DISTWIDTH] IN BLOOD BY AUTOMATED COUNT: 15.4 % (ref 10–15)
GFR SERPL CREATININE-BSD FRML MDRD: 45 ML/MIN/{1.73_M2}
GLUCOSE SERPL-MCNC: 161 MG/DL (ref 70–99)
HCT VFR BLD AUTO: 34.3 % (ref 40–53)
HGB BLD-MCNC: 10.8 G/DL (ref 13.3–17.7)
IMM GRANULOCYTES # BLD: 0.1 10E9/L (ref 0–0.4)
IMM GRANULOCYTES NFR BLD: 1.2 %
LYMPHOCYTES # BLD AUTO: 2.2 10E9/L (ref 0.8–5.3)
LYMPHOCYTES NFR BLD AUTO: 26.4 %
MCH RBC QN AUTO: 30 PG (ref 26.5–33)
MCHC RBC AUTO-ENTMCNC: 31.5 G/DL (ref 31.5–36.5)
MCV RBC AUTO: 95 FL (ref 78–100)
MONOCYTES # BLD AUTO: 0.7 10E9/L (ref 0–1.3)
MONOCYTES NFR BLD AUTO: 8.6 %
NEUTROPHILS # BLD AUTO: 5.1 10E9/L (ref 1.6–8.3)
NEUTROPHILS NFR BLD AUTO: 60.6 %
NRBC # BLD AUTO: 0 10*3/UL
NRBC BLD AUTO-RTO: 0 /100
PLATELET # BLD AUTO: 273 10E9/L (ref 150–450)
POTASSIUM SERPL-SCNC: 3.9 MMOL/L (ref 3.4–5.3)
RBC # BLD AUTO: 3.6 10E12/L (ref 4.4–5.9)
SODIUM SERPL-SCNC: 141 MMOL/L (ref 133–144)
URATE SERPL-MCNC: 10.8 MG/DL (ref 3.5–7.2)
WBC # BLD AUTO: 8.5 10E9/L (ref 4–11)

## 2019-11-28 PROCEDURE — 99284 EMERGENCY DEPT VISIT MOD MDM: CPT

## 2019-11-28 PROCEDURE — 84550 ASSAY OF BLOOD/URIC ACID: CPT | Performed by: EMERGENCY MEDICINE

## 2019-11-28 PROCEDURE — 86140 C-REACTIVE PROTEIN: CPT | Performed by: EMERGENCY MEDICINE

## 2019-11-28 PROCEDURE — 25000131 ZZH RX MED GY IP 250 OP 636 PS 637: Performed by: EMERGENCY MEDICINE

## 2019-11-28 PROCEDURE — 80048 BASIC METABOLIC PNL TOTAL CA: CPT | Performed by: EMERGENCY MEDICINE

## 2019-11-28 PROCEDURE — 73140 X-RAY EXAM OF FINGER(S): CPT | Mod: LT

## 2019-11-28 PROCEDURE — 85025 COMPLETE CBC W/AUTO DIFF WBC: CPT | Performed by: EMERGENCY MEDICINE

## 2019-11-28 PROCEDURE — 25000132 ZZH RX MED GY IP 250 OP 250 PS 637: Performed by: EMERGENCY MEDICINE

## 2019-11-28 PROCEDURE — 25000125 ZZHC RX 250: Performed by: EMERGENCY MEDICINE

## 2019-11-28 RX ORDER — COLCHICINE 0.6 MG/1
0.6 TABLET ORAL ONCE
Status: COMPLETED | OUTPATIENT
Start: 2019-11-28 | End: 2019-11-28

## 2019-11-28 RX ORDER — INDOMETHACIN 25 MG/1
25 CAPSULE ORAL 2 TIMES DAILY WITH MEALS
Qty: 20 CAPSULE | Refills: 0 | Status: SHIPPED | OUTPATIENT
Start: 2019-11-28 | End: 2019-12-31

## 2019-11-28 RX ORDER — COLCHICINE 0.6 MG/1
0.6 CAPSULE ORAL 2 TIMES DAILY
Qty: 30 CAPSULE | Refills: 0 | Status: SHIPPED | OUTPATIENT
Start: 2019-11-28 | End: 2019-12-31

## 2019-11-28 RX ORDER — PREDNISONE 10 MG/1
TABLET ORAL
Qty: 32 TABLET | Refills: 0 | Status: SHIPPED | OUTPATIENT
Start: 2019-11-28 | End: 2020-01-24

## 2019-11-28 RX ORDER — OXYCODONE HYDROCHLORIDE 5 MG/1
5 TABLET ORAL EVERY 6 HOURS PRN
Qty: 12 TABLET | Refills: 0 | Status: SHIPPED | OUTPATIENT
Start: 2019-11-28 | End: 2020-01-15

## 2019-11-28 RX ADMIN — COLCHICINE 0.6 MG: 0.6 TABLET, FILM COATED ORAL at 19:53

## 2019-11-28 RX ADMIN — ORAL VEHICLES - SUSP 10 MG: SUSPENSION at 19:09

## 2019-11-28 ASSESSMENT — ENCOUNTER SYMPTOMS
FEVER: 0
JOINT SWELLING: 1
SHORTNESS OF BREATH: 0

## 2019-11-28 NOTE — ED AVS SNAPSHOT
[facility] Emergency Department  [address]  [facility] [address]  Phone:  [phone]  Fax:  [phone]                                    Lance Ibrahim   MRN: [id number]    Department:  [facility] Emergency Department   Date of Visit:  11/28/2019           After Visit Summary Signature Page    I have received my discharge instructions, and my questions have been answered. I have discussed any challenges I see with this plan with the nurse or doctor.    ..........................................................................................................................................  Patient/Patient Representative Signature      ..........................................................................................................................................  Patient Representative Print Name and Relationship to Patient    ..................................................               ................................................  Date                                   Time    ..........................................................................................................................................  Reviewed by Signature/Title    ...................................................              ..............................................  Date                                               Time          22EPIC Rev 08/18

## 2019-11-29 NOTE — DISCHARGE INSTRUCTIONS
USE EITHER INDOMETHACIN OR PREDNISONE. USE COLCHICINE TWICE A DAY, USE PAIN MEDICATION WHEN NEEDED.    RETURN TO ER WITH FEVER OR REDNESS OR SWELLING SPREADING UP THE ARM     DISCUSS WITH PRIMARY CARE USE OF ALLOPURINOL AND PREVENTION OF URIC AIDEMIA.

## 2019-11-29 NOTE — ED PROVIDER NOTES
History     Chief Complaint:  Finger Swelling    HPI   Lance Ibrahim is a 74 year old male who presents to the emergency department today for evaluation of Left Middle and Ring finger. Patient reports that he had developed acute, nontraumatic finger swelling for the past 4 days. He describes the pain to be constant, sharp, and a 9/10. The pain also exacerbates with movement. Patient tried Aleve last night, lidocaine strips and 4 ibuprofen since 0400 this morning. Patient mentions he has history of gout in his knee following surgery February 2019 and was not given daily medication for it. He denies any fever, shortness of breath, or recent injuries.     Allergies:  Penicillins  Sulfa drugs  Ancef     Medications:    amLODIPine (NORVASC) 5 MG tablet  aspirin (ASA) 81 MG EC tablet  atorvastatin (LIPITOR) 40 MG tablet  blood glucose (ACCU-CHEK SMARTVIEW) test strip  clindamycin (CLEOCIN) 300 MG capsule  Ferrous Gluconate 240 (27 Fe) MG TABS  finasteride (PROSCAR) 5 MG tablet  fluticasone (FLONASE) 50 MCG/ACT spray  isosorbide mononitrate (IMDUR) 30 MG 24 hr tablet  labetalol (NORMODYNE) 200 MG tablet  lisinopril (PRINIVIL/ZESTRIL) 10 MG tablet  loratadine (CLARITIN) 10 MG tablet  metFORMIN (GLUCOPHAGE) 1000 MG tablet  Multiple Vitamins-Minerals (MULTIVITAMIN ADULTS PO)  silver sulfADIAZINE (SILVADENE) 1 % external cream  tamsulosin (FLOMAX) 0.4 MG 24 hr capsule  torsemide (DEMADEX) 20 MG tablet    Past Medical History:    Type 2 diabetes  Hypertension   TIA  Cardiomyopathy  Sepsis  Cervicalgia  BPH  Gout  Chronic rhinitis   Sleep apnea  CAD  HLD    Past Surgical History:    Amputate toe  Arthroplasty knee  Diastasis recti repair  Ventral hernia repair  ORIF of left shoulder  Colonscopy  CV heart catheterization  Left eye cataract surgery  Hernia repair    Family History:    Father: cancer    Social History:  The patient was not accompanied to the ED.  Smoking Status: Former Smoker  Smokeless Tobacco: Never  Used  Alcohol Use: Not currently  Drug Use: Negative  PCP: Anh Spivey   Marital Status:        Review of Systems   Constitutional: Negative for fever.   Respiratory: Negative for shortness of breath.    Musculoskeletal: Positive for joint swelling.   All other systems reviewed and are negative.        Physical Exam     Patient Vitals for the past 24 hrs:   BP Temp Temp src Heart Rate Resp SpO2   11/28/19 1838 (!) 168/79 -- -- -- -- --   11/28/19 1837 -- 97.6  F (36.4  C) Oral 102 18 98 %        Physical Exam  Vitals signs reviewed.   Cardiovascular:      Rate and Rhythm: Normal rate.   Pulmonary:      Effort: Pulmonary effort is normal.   Musculoskeletal:      Comments: Left middle finger shows fusiform swelling around the PIP joint.  There is warmth and tenderness around the PIP joint.  There is no open wound.  Limited flexion extension due to pain.   Skin:     General: Skin is warm.   Neurological:      Mental Status: He is alert.            Emergency Department Course     Imaging:  Radiology findings were communicated with the patient who voiced understanding of the findings.    XR Finger Left G/E 2 Views  Mild soft tissue swelling, proximal left middle finger. No underlying fracture or malalignment. No radiopaque foreign body.    Laboratory:  Laboratory findings were communicated with the patient who voiced understanding of the findings.    CBC: WBC 8.5, HGB 10.8 (L),   BMP: Chloride 110 (H), Glucose 161(H), BUN 31 (H), Creatinine 1.50 (H), GFR estimate 45 (L) o/w WNL   CRP inflammation: 4.4  Uric acid: 10.8(H)    Interventions:  1909 Decadron 0 mg PO  1953 Colcyrs 0.6 mg PO    Emergency Department Course:    1839 Nursing notes and vitals reviewed.    1900 I performed an exam of the patient as documented above.     1948 Patient rechecked and updated.      1958 The patient was sent for a XR right finger while in the emergency department, results above.      I discussed the treatment plan  with the patient. They expressed understanding of this plan and consented to discharge. They will be discharged home with instructions for care and follow up. In addition, the patient will return to the emergency department if their symptoms persist, worsen, if new symptoms arise or if there is any concern.  All questions were answered.     Impression & Plan      Medical Decision Making:  Lance Ibrahim is a 74 year old male who presents to the emergency department today for evaluation of finger redness and swelling.  Patient is a 74-year-old male with a known history of gout in his knee not on any gout prophylaxis and here with joint pain.  Clinically suspicious for gout.  I did consider arthrocentesis but due to the small joint and the risk for infection I consider lab test instead.  Patient's white count is normal CRP is normal uric acid level is 10.  Clinically suspect acute gouty arthritis of the PIP.  Patient was discussed to see no reason for arthrocentesis at this time.  Patient is offered steroids nonsteroidal anti-inflammatories colchicine and follow-up with primary care to manage long-term uric acidemia.  To return with fever to consider arthrocentesis.    Diagnosis:    ICD-10-CM    1. Acute gouty arthritis M10.9      Disposition:   The patient is discharged to home.     Discharge Medication List as of 11/28/2019  8:15 PM      START taking these medications    Details   colchicine (MITIGARE) 0.6 MG capsule Take 1 capsule (0.6 mg) by mouth 2 times daily, Disp-30 capsule, R-0, Local Print      indomethacin (INDOCIN) 25 MG capsule Take 1 capsule (25 mg) by mouth 2 times daily (with meals) for 10 days, Disp-20 capsule, R-0, Local Print      oxyCODONE (ROXICODONE) 5 MG tablet Take 1 tablet (5 mg) by mouth every 6 hours as needed for pain, Disp-12 tablet, R-0, Local Print      predniSONE (DELTASONE) 10 MG tablet Take 4 tablets daily for 5 days,  take 2 tablets daily for 3 days, take 1 tablet daily for 3 days,  take half a tablet for 3 days., Disp-32 tablet, R-0, Local Print             Scribe Disclosure:  I, Siva Kentrell, am serving as a scribe at 7:50 PM on 11/28/2019 to document services personally performed by Omari Wood MD based on my observations and the provider's statements to me.  [facility] EMERGENCY DEPARTMENT       Omari Wood MD  11/29/19 6226

## 2019-12-15 ENCOUNTER — HEALTH MAINTENANCE LETTER (OUTPATIENT)
Age: 74
End: 2019-12-15

## 2019-12-22 DIAGNOSIS — E11.9 TYPE 2 DIABETES MELLITUS (H): ICD-10-CM

## 2019-12-23 ENCOUNTER — TRANSFERRED RECORDS (OUTPATIENT)
Dept: HEALTH INFORMATION MANAGEMENT | Facility: CLINIC | Age: 74
End: 2019-12-23

## 2019-12-23 NOTE — TELEPHONE ENCOUNTER
"Requested Prescriptions   Pending Prescriptions Disp Refills     blood glucose monitoring (ACCU-CHEK FASTCLIX) lancets [Pharmacy Med Name: ACCU-CHEK FASTCLIX LANCET DRUM]  Last Written Prescription Date:    Last Fill Quantity: ,  # refills:    Last office visit: 7/25/2019 with prescribing provider:     Future Office Visit:   204 each 1     Sig: USE LANCETS 2 TIMES DAILY, AS DIRECTED.       Diabetic Supplies Protocol Failed - 12/22/2019  6:43 PM        Failed - Medication is active on med list        Passed - Patient is 18 years of age or older        Passed - Recent (6 mo) or future (30 days) visit within the authorizing provider's specialty     Patient had office visit in the last 6 months or has a visit in the next 30 days with authorizing provider.  See \"Patient Info\" tab in inbasket, or \"Choose Columns\" in Meds & Orders section of the refill encounter.            "

## 2019-12-24 RX ORDER — LANCETS
EACH MISCELLANEOUS
Qty: 200 EACH | Refills: 1 | Status: SHIPPED | OUTPATIENT
Start: 2019-12-24 | End: 2020-10-07

## 2019-12-31 ENCOUNTER — TELEPHONE (OUTPATIENT)
Dept: INTERNAL MEDICINE | Facility: CLINIC | Age: 74
End: 2019-12-31

## 2019-12-31 DIAGNOSIS — M10.00 IDIOPATHIC GOUT, UNSPECIFIED CHRONICITY, UNSPECIFIED SITE: Primary | ICD-10-CM

## 2019-12-31 RX ORDER — METHYLPREDNISOLONE 4 MG
TABLET, DOSE PACK ORAL
Qty: 21 TABLET | Refills: 0 | Status: SHIPPED | OUTPATIENT
Start: 2019-12-31 | End: 2020-01-15

## 2019-12-31 RX ORDER — INDOMETHACIN 25 MG/1
25 CAPSULE ORAL 2 TIMES DAILY WITH MEALS
Qty: 20 CAPSULE | Refills: 1 | Status: SHIPPED | OUTPATIENT
Start: 2019-12-31 | End: 2020-01-15

## 2019-12-31 RX ORDER — COLCHICINE 0.6 MG/1
0.6 CAPSULE ORAL 2 TIMES DAILY
Qty: 60 CAPSULE | Refills: 0 | Status: SHIPPED | OUTPATIENT
Start: 2019-12-31 | End: 2020-01-15

## 2019-12-31 NOTE — TELEPHONE ENCOUNTER
See message below. Went to ED on 11/28/19 and was diagnosed with Gout in finger.   Was given Colchicine and Indomethacin.   Please advise.     Last OV on 7/25/19     Uric Acid   Date Value Ref Range Status   11/28/2019 10.8 (H) 3.5 - 7.2 mg/dL Final     Creatinine   Date Value Ref Range Status   11/28/2019 1.50 (H) 0.66 - 1.25 mg/dL Final     BP Readings from Last 3 Encounters:   11/28/19 (!) 168/79   10/30/19 118/62   10/15/19 116/66

## 2019-12-31 NOTE — TELEPHONE ENCOUNTER
Patient calling and has another flare up of gout. He would like medication to help with this problem. Patient is willing to come in for a visit. He was seen in ED on Thanksgiving and test positive for high uric acid. Please advise. Ok to call and lm [phone]

## 2020-01-06 ENCOUNTER — TELEPHONE (OUTPATIENT)
Dept: INTERNAL MEDICINE | Facility: CLINIC | Age: 75
End: 2020-01-06

## 2020-01-06 NOTE — TELEPHONE ENCOUNTER
Patient calling and his insurance called him with a possible drug interaction between Atorvastatin and Colchicine  Please advise if he should stop one of these medications  Ok to call and  [phone]

## 2020-01-07 NOTE — TELEPHONE ENCOUNTER
Please advise pt pharmacist recommended holding the statin while taking colchicine  Anh Spivey CNP

## 2020-01-15 ENCOUNTER — OFFICE VISIT (OUTPATIENT)
Dept: INTERNAL MEDICINE | Facility: CLINIC | Age: 75
End: 2020-01-15
Payer: COMMERCIAL

## 2020-01-15 VITALS
HEIGHT: 71 IN | TEMPERATURE: 97.5 F | SYSTOLIC BLOOD PRESSURE: 122 MMHG | BODY MASS INDEX: 36.24 KG/M2 | HEART RATE: 114 BPM | DIASTOLIC BLOOD PRESSURE: 56 MMHG | WEIGHT: 258.9 LBS | RESPIRATION RATE: 16 BRPM | OXYGEN SATURATION: 96 %

## 2020-01-15 DIAGNOSIS — L08.9 DIABETIC FOOT INFECTION (H): Primary | ICD-10-CM

## 2020-01-15 DIAGNOSIS — E11.628 DIABETIC FOOT INFECTION (H): Primary | ICD-10-CM

## 2020-01-15 PROCEDURE — 99213 OFFICE O/P EST LOW 20 MIN: CPT | Performed by: NURSE PRACTITIONER

## 2020-01-15 ASSESSMENT — MIFFLIN-ST. JEOR: SCORE: 1931.49

## 2020-01-15 NOTE — PROGRESS NOTES
Subjective     Lance Ibrahim is a 74 year old male who presents to clinic today for the following health issues:    HPI   Chronic R great toe diabetic ulcer, minimal drainage at this time  Wonders if needs wound clinic vs podiatry and debridement          Patient Active Problem List   Diagnosis     Ventral hernia     Chronic rhinitis     Hypertrophy of prostate with urinary obstruction     Transient cerebral ischemia     Essential hypertension     CARDIOVASCULAR SCREENING; LDL GOAL LESS THAN 100     Gout     Type 2 diabetes, HbA1c goal < 7% (H)     Obesity     Diabetes mellitus type 2, uncomplicated (H)     Benign essential hypertension     Cervicalgia     Cellulitis     Diabetic foot infection (H)     Amputated toe, left (H)     Type 2 diabetes mellitus with peripheral vascular disease (H)     Total knee replacement status     Dyspnea on exertion     Chest pain, unspecified type     Cardiomyopathy, unspecified type (H)     Shortness of breath     Abnormal stress test     Renal colic     Past Surgical History:   Procedure Laterality Date     AMPUTATE TOE(S) Left 10/15/2018    Procedure: AMPUTATE TOE(S);  Left third toe amputation;  Surgeon: Ayaka Azevedo DPM, Podiatry/Foot and Ankle Surgery;  Location: [facility] OR     ARTHROPLASTY KNEE Right 12/7/2018    Procedure: Right total knee arthroplasty;  Surgeon: Issa Cunha MD;  Location: [facility] OR     C NONSPECIFIC PROCEDURE  1985    Diastasis recti repair     C NONSPECIFIC PROCEDURE  1987    Ventral hernia repair     C NONSPECIFIC PROCEDURE      ORIF of left shoulder     COLONOSCOPY  3/9/2013    Procedure: COLONOSCOPY;  COLONOSCOPY;  Surgeon: Chau Hogan MD;  Location:  GI     CV HEART CATHETERIZATION WITH POSSIBLE INTERVENTION Left 3/5/2019    Procedure: Coronary Angiogram;  Surgeon: Nas Linda MD;  Location:  HEART CARDIAC CATH LAB     EYE SURGERY  03/13/2017    left eye cataract     FOOT SURGERY  4/2013    cyst removal      HERNIA REPAIR  7/13/2004     ventral        Social History     Tobacco Use     Smoking status: Former Smoker     Packs/day: 0.00     Last attempt to quit: 3/17/1993     Years since quittin.8     Smokeless tobacco: Never Used   Substance Use Topics     Alcohol use: Not Currently     Family History   Problem Relation Age of Onset     Family History Negative Mother          86 yo     Cancer Father          74 yo brain     Diabetes Maternal Grandfather          91 yo     Alcohol/Drug Paternal Grandfather                  Current Outpatient Medications   Medication Sig Dispense Refill     amLODIPine (NORVASC) 5 MG tablet Take 1 tablet (5 mg) by mouth daily 90 tablet 3     aspirin (ASA) 81 MG EC tablet Take 1 tablet (81 mg) by mouth daily 100 tablet 3     atorvastatin (LIPITOR) 40 MG tablet Take 1 tablet (40 mg) by mouth daily 90 tablet 3     blood glucose (ACCU-CHEK SMARTVIEW) test strip Use to test blood sugar 1 times daily or as directed. 100 strip 3     blood glucose monitoring (ACCU-CHEK FASTCLIX) lancets USE LANCETS 2 TIMES DAILY, AS DIRECTED. 200 each 1     Ferrous Gluconate 240 (27 Fe) MG TABS Take 1 tablet by mouth daily        finasteride (PROSCAR) 5 MG tablet Take 5 mg by mouth daily.       fluticasone (FLONASE) 50 MCG/ACT spray Spray 1 spray into both nostrils nightly as needed        isosorbide mononitrate (IMDUR) 30 MG 24 hr tablet Take 1 tablet (30 mg) by mouth daily 30 tablet 11     labetalol (NORMODYNE) 200 MG tablet Take 1 tablet (200 mg) by mouth 2 times daily 180 tablet 3     lisinopril (PRINIVIL/ZESTRIL) 10 MG tablet Take 3 tablets (30 mg) by mouth daily 270 tablet 0     loratadine (CLARITIN) 10 MG tablet Take 10 mg by mouth as needed        metFORMIN (GLUCOPHAGE) 1000 MG tablet TAKE 1 TABLET BY MOUTH TWICE A DAY WITH MEALS 180 tablet 1     Multiple Vitamins-Minerals (MULTIVITAMIN ADULTS PO) Take 1 tablet by mouth daily        silver sulfADIAZINE (SILVADENE) 1 % external cream Apply topically  "daily 25 g 1     tamsulosin (FLOMAX) 0.4 MG 24 hr capsule Take 1 capsule by mouth daily.       torsemide (DEMADEX) 20 MG tablet Take 1 tablet (20 mg) by mouth daily 90 tablet 3     Recent Labs   Lab Test 11/28/19  1914 10/11/19  0804 06/25/19  0754 03/25/19  1647  10/17/18  0709  05/01/18  1951 05/01/18  1332 10/07/17  1036 05/15/17  1714 10/22/16  2052  11/10/14  1343   A1C  --   --   --  6.3*  --  6.4*  --  6.7*  --  6.3* 6.3*  --    < >  --    LDL  --   --  43  --   --   --   --   --   --  72 86  --    < >  --    HDL  --   --  63  --   --   --   --   --   --  65 53  --    < >  --    TRIG  --   --  65  --   --   --   --   --   --  68 99  --    < >  --    ALT  --   --  24  --   --   --   --   --  30  --   --  28  --   --    CR 1.50* 1.43* Canceled, Test credited 1.50*   < > 1.15   < > 1.06 0.93 0.88 0.88 1.06   < >  --    GFRESTIMATED 45* 47* Canceled, Test credited 45*   < > 62   < > 68 80 85 85 69   < >  --    GFRESTBLACK 52* 55* Canceled, Test credited 52*   < > 75   < > 83 >90 >90 >90   GFR Calc   83   < >  --    POTASSIUM 3.9 4.4 Canceled, Test credited 4.7   < >  --    < >  --  4.4 4.3 3.9 4.1   < >  --    TSH  --   --   --   --   --   --   --   --  2.19  --   --   --   --  3.90    < > = values in this interval not displayed.      BP Readings from Last 3 Encounters:   01/15/20 122/56   11/28/19 (!) 168/79   10/30/19 118/62    Wt Readings from Last 3 Encounters:   01/15/20 117.4 kg (258 lb 14.4 oz)   10/30/19 117.9 kg (260 lb)   10/15/19 118 kg (260 lb 1.6 oz)                      Reviewed and updated as needed this visit by Provider         Review of Systems   ROS COMP: Constitutional, HEENT, cardiovascular, pulmonary, gi and gu systems are negative, except as otherwise noted.      Objective    /56 (BP Location: Right arm, Patient Position: Sitting, Cuff Size: Adult Large)   Pulse 114   Temp 97.5  F (36.4  C) (Oral)   Resp 16   Ht 1.803 m (5' 11\")   Wt 117.4 kg (258 lb 14.4 oz)   " SpO2 96%   BMI 36.11 kg/m    Body mass index is 36.11 kg/m .  Physical Exam   GENERAL: alert, no distress and obese  Diabetic foot exam: DP reduced right, PT reduced right and ulceration at R great toe ,1.5 cm round 3 mm deep,  No discharge            Assessment & Plan       ICD-10-CM    1. Diabetic foot infection (H) E11.628     L08.9           F/u podiatry for lydia, tx, management  Pt agrees.    Anh Spivey NP  [facility]

## 2020-01-15 NOTE — NURSING NOTE
"would like a referral for wound care. Wound on bottom of great rt toe.  /56 (BP Location: Right arm, Patient Position: Sitting, Cuff Size: Adult Large)   Pulse 114   Temp 97.5  F (36.4  C) (Oral)   Resp 16   Ht 1.803 m (5' 11\")   Wt 117.4 kg (258 lb 14.4 oz)   SpO2 96%   BMI 36.11 kg/m      "

## 2020-01-22 DIAGNOSIS — M10.00 IDIOPATHIC GOUT, UNSPECIFIED CHRONICITY, UNSPECIFIED SITE: ICD-10-CM

## 2020-01-22 NOTE — TELEPHONE ENCOUNTER
"Requested Prescriptions   Pending Prescriptions Disp Refills     MITIGARE 0.6 MG capsule [Pharmacy Med Name: MITIGARE 0.6 MG CAPSULE] 60 capsule 0     Sig: TAKE 1 CAPSULE (0.6 MG) BY MOUTH 2 TIMES DAILY       Gout Agents Protocol Failed - 1/22/2020  7:47 AM        Failed - Has Uric Acid on file in past 12 months and value is less than 6     Recent Labs   Lab Test 11/28/19 1914   URIC 10.8*     If level is 6mg/dL or greater, ok to refill one time and refer to provider.           Failed - Medication is active on med list        Failed - Normal serum creatinine on file in the past 12 months     Recent Labs   Lab Test 11/28/19 1914   CR 1.50*             Passed - CBC on file in past 12 months     Recent Labs   Lab Test 11/28/19 1914   WBC 8.5   RBC 3.60*   HGB 10.8*   HCT 34.3*                    Passed - ALT on file in past 12 months     Recent Labs   Lab Test 06/25/19  0754   ALT 24             Passed - Recent (12 mo) or future (30 days) visit within the authorizing provider's specialty     Patient has had an office visit with the authorizing provider or a provider within the authorizing providers department within the previous 12 mos or has a future within next 30 days. See \"Patient Info\" tab in inbasket, or \"Choose Columns\" in Meds & Orders section of the refill encounter.              Passed - Patient is age 18 or older        Last Written Prescription Date:  Historical??  Last Fill Quantity: n/a,  # refills: n/a   Last office visit: 1/15/2020 with prescribing provider:  1/15/20   Future Office Visit:      "

## 2020-01-23 RX ORDER — COLCHICINE 0.6 MG/1
CAPSULE ORAL
Qty: 60 CAPSULE | Refills: 0 | Status: SHIPPED | OUTPATIENT
Start: 2020-01-23 | End: 2020-01-24

## 2020-01-23 NOTE — TELEPHONE ENCOUNTER
Colchicine (Mitigare) ordered when pt was at ED on 11/28/19. Was discontinued from med list at OV on 1/15/20.     Refill request for Mitigare.     Last OV on 1/15/20    Provider approval needed.     Creatinine   Date Value Ref Range Status   11/28/2019 1.50 (H) 0.66 - 1.25 mg/dL Final     Uric Acid   Date Value Ref Range Status   11/28/2019 10.8 (H) 3.5 - 7.2 mg/dL Final     Hemoglobin   Date Value Ref Range Status   11/28/2019 10.8 (L) 13.3 - 17.7 g/dL Final   ]

## 2020-01-24 ENCOUNTER — HOSPITAL ENCOUNTER (OUTPATIENT)
Dept: CT IMAGING | Facility: CLINIC | Age: 75
Discharge: HOME | End: 2020-01-24
Attending: INTERNAL MEDICINE | Admitting: INTERNAL MEDICINE
Payer: COMMERCIAL

## 2020-01-24 ENCOUNTER — ANCILLARY PROCEDURE (OUTPATIENT)
Dept: GENERAL RADIOLOGY | Facility: CLINIC | Age: 75
End: 2020-01-24
Attending: INTERNAL MEDICINE
Payer: COMMERCIAL

## 2020-01-24 ENCOUNTER — OFFICE VISIT (OUTPATIENT)
Dept: INTERNAL MEDICINE | Facility: CLINIC | Age: 75
End: 2020-01-24
Payer: COMMERCIAL

## 2020-01-24 VITALS
BODY MASS INDEX: 34.75 KG/M2 | TEMPERATURE: 98.4 F | OXYGEN SATURATION: 96 % | DIASTOLIC BLOOD PRESSURE: 60 MMHG | SYSTOLIC BLOOD PRESSURE: 104 MMHG | WEIGHT: 248.2 LBS | HEIGHT: 71 IN | HEART RATE: 90 BPM | RESPIRATION RATE: 24 BRPM

## 2020-01-24 DIAGNOSIS — R19.7 DIARRHEA, UNSPECIFIED TYPE: ICD-10-CM

## 2020-01-24 DIAGNOSIS — R05.9 COUGH: ICD-10-CM

## 2020-01-24 DIAGNOSIS — R63.0 LOSS OF APPETITE: ICD-10-CM

## 2020-01-24 DIAGNOSIS — R63.4 WEIGHT LOSS: ICD-10-CM

## 2020-01-24 DIAGNOSIS — R05.9 COUGH: Primary | ICD-10-CM

## 2020-01-24 DIAGNOSIS — Z98.890 S/P REPAIR OF VENTRAL HERNIA: ICD-10-CM

## 2020-01-24 DIAGNOSIS — Z87.19 S/P REPAIR OF VENTRAL HERNIA: ICD-10-CM

## 2020-01-24 DIAGNOSIS — D50.9 IRON DEFICIENCY ANEMIA, UNSPECIFIED IRON DEFICIENCY ANEMIA TYPE: ICD-10-CM

## 2020-01-24 LAB
ALBUMIN SERPL-MCNC: 3.6 G/DL (ref 3.4–5)
ALBUMIN UR-MCNC: NEGATIVE MG/DL
ALP SERPL-CCNC: 89 U/L (ref 40–150)
ALT SERPL W P-5'-P-CCNC: 37 U/L (ref 0–70)
ANION GAP SERPL CALCULATED.3IONS-SCNC: 8 MMOL/L (ref 3–14)
APPEARANCE UR: CLEAR
AST SERPL W P-5'-P-CCNC: 21 U/L (ref 0–45)
BACTERIA #/AREA URNS HPF: ABNORMAL /HPF
BILIRUB SERPL-MCNC: 0.3 MG/DL (ref 0.2–1.3)
BILIRUB UR QL STRIP: ABNORMAL
BUN SERPL-MCNC: 28 MG/DL (ref 7–30)
CALCIUM SERPL-MCNC: 9.5 MG/DL (ref 8.5–10.1)
CHLORIDE SERPL-SCNC: 110 MMOL/L (ref 94–109)
CO2 SERPL-SCNC: 23 MMOL/L (ref 20–32)
COLOR UR AUTO: YELLOW
CREAT SERPL-MCNC: 1.78 MG/DL (ref 0.66–1.25)
ERYTHROCYTE [DISTWIDTH] IN BLOOD BY AUTOMATED COUNT: 17.3 % (ref 10–15)
FERRITIN SERPL-MCNC: 64 NG/ML (ref 26–388)
FLUAV+FLUBV AG SPEC QL: NEGATIVE
FLUAV+FLUBV AG SPEC QL: NEGATIVE
GFR SERPL CREATININE-BSD FRML MDRD: 36 ML/MIN/{1.73_M2}
GLUCOSE SERPL-MCNC: 112 MG/DL (ref 70–99)
GLUCOSE UR STRIP-MCNC: NEGATIVE MG/DL
HCT VFR BLD AUTO: 37.5 % (ref 40–53)
HGB BLD-MCNC: 11.5 G/DL (ref 13.3–17.7)
HGB UR QL STRIP: NEGATIVE
HYALINE CASTS #/AREA URNS LPF: ABNORMAL /LPF
IRON SATN MFR SERPL: 11 % (ref 15–46)
IRON SERPL-MCNC: 42 UG/DL (ref 35–180)
KETONES UR STRIP-MCNC: 15 MG/DL
LEUKOCYTE ESTERASE UR QL STRIP: NEGATIVE
MAGNESIUM SERPL-MCNC: 1.9 MG/DL (ref 1.6–2.3)
MCH RBC QN AUTO: 29.3 PG (ref 26.5–33)
MCHC RBC AUTO-ENTMCNC: 30.7 G/DL (ref 31.5–36.5)
MCV RBC AUTO: 95 FL (ref 78–100)
NITRATE UR QL: NEGATIVE
PH UR STRIP: 5 PH (ref 5–7)
PLATELET # BLD AUTO: 281 10E9/L (ref 150–450)
POTASSIUM SERPL-SCNC: 5 MMOL/L (ref 3.4–5.3)
PROT SERPL-MCNC: 7.9 G/DL (ref 6.8–8.8)
RBC # BLD AUTO: 3.93 10E12/L (ref 4.4–5.9)
RBC #/AREA URNS AUTO: ABNORMAL /HPF
SODIUM SERPL-SCNC: 141 MMOL/L (ref 133–144)
SOURCE: ABNORMAL
SP GR UR STRIP: 1.02 (ref 1–1.03)
SPECIMEN SOURCE: NORMAL
TIBC SERPL-MCNC: 379 UG/DL (ref 240–430)
TSH SERPL DL<=0.005 MIU/L-ACNC: 3.24 MU/L (ref 0.4–4)
UROBILINOGEN UR STRIP-ACNC: 0.2 EU/DL (ref 0.2–1)
WBC # BLD AUTO: 9.4 10E9/L (ref 4–11)
WBC #/AREA URNS AUTO: ABNORMAL /HPF

## 2020-01-24 PROCEDURE — 83540 ASSAY OF IRON: CPT | Performed by: INTERNAL MEDICINE

## 2020-01-24 PROCEDURE — 85027 COMPLETE CBC AUTOMATED: CPT | Performed by: INTERNAL MEDICINE

## 2020-01-24 PROCEDURE — 36415 COLL VENOUS BLD VENIPUNCTURE: CPT | Performed by: INTERNAL MEDICINE

## 2020-01-24 PROCEDURE — 71046 X-RAY EXAM CHEST 2 VIEWS: CPT

## 2020-01-24 PROCEDURE — 83735 ASSAY OF MAGNESIUM: CPT | Performed by: INTERNAL MEDICINE

## 2020-01-24 PROCEDURE — 83550 IRON BINDING TEST: CPT | Performed by: INTERNAL MEDICINE

## 2020-01-24 PROCEDURE — 82728 ASSAY OF FERRITIN: CPT | Performed by: INTERNAL MEDICINE

## 2020-01-24 PROCEDURE — 74176 CT ABD & PELVIS W/O CONTRAST: CPT

## 2020-01-24 PROCEDURE — 87493 C DIFF AMPLIFIED PROBE: CPT | Performed by: INTERNAL MEDICINE

## 2020-01-24 PROCEDURE — 80053 COMPREHEN METABOLIC PANEL: CPT | Performed by: INTERNAL MEDICINE

## 2020-01-24 PROCEDURE — 81001 URINALYSIS AUTO W/SCOPE: CPT | Performed by: INTERNAL MEDICINE

## 2020-01-24 PROCEDURE — 99214 OFFICE O/P EST MOD 30 MIN: CPT | Performed by: INTERNAL MEDICINE

## 2020-01-24 PROCEDURE — 87804 INFLUENZA ASSAY W/OPTIC: CPT | Performed by: INTERNAL MEDICINE

## 2020-01-24 PROCEDURE — 84443 ASSAY THYROID STIM HORMONE: CPT | Performed by: INTERNAL MEDICINE

## 2020-01-24 ASSESSMENT — ENCOUNTER SYMPTOMS
VOMITING: 0
APPETITE CHANGE: 1
NAUSEA: 1
FATIGUE: 0
SHORTNESS OF BREATH: 0
DIARRHEA: 1
ABDOMINAL PAIN: 1
COUGH: 1
UNEXPECTED WEIGHT CHANGE: 0
CONSTIPATION: 0

## 2020-01-24 ASSESSMENT — MIFFLIN-ST. JEOR: SCORE: 1882.96

## 2020-01-24 NOTE — PROGRESS NOTES
Subjective     Lance Ibrahim is a 74 year old male who presents to clinic today for the following health issues:    HPI   Patient is here with 1 week history of diarrhea.  Patient gets at least 5-6 episodes a day they are watery most of the time.  Denies any blood or dark stool with that.  Patient gets abdominal cramping before having diarrhea.  Patient tried Imodium that did not help.  Denies alternating constipation.  Patient feels a lot nauseous intermittently but has no vomiting.  Denies fever but her appetite has been significantly diminished and usually eats toast and juice for the whole day and has lost 10 pounds in 1 week.  Last colonoscopy per records was 2013.  I could not see the results.  Per patient denies any known diagnosis of diverticulosis or polyp and recommended follow-up in 10 years.  Had abdominal surgery, ventral hernia repair.  Denies any use of antibiotic recently or hospitalization.  On reviewing the lab patient has been chronically anemic with hemoglobin around 10-11.  Hemoglobin in May 2018 was around  13.    Patient also has chest congestion with cough and bringing up few times.  Patient is also congested nasally denies shortness of breath or wheezing.    Patient Active Problem List   Diagnosis     Ventral hernia     Chronic rhinitis     Hypertrophy of prostate with urinary obstruction     Transient cerebral ischemia     Essential hypertension     CARDIOVASCULAR SCREENING; LDL GOAL LESS THAN 100     Gout     Type 2 diabetes, HbA1c goal < 7% (H)     Obesity     Diabetes mellitus type 2, uncomplicated (H)     Benign essential hypertension     Cervicalgia     Cellulitis     Diabetic foot infection (H)     Amputated toe, left (H)     Type 2 diabetes mellitus with peripheral vascular disease (H)     Total knee replacement status     Dyspnea on exertion     Chest pain, unspecified type     Cardiomyopathy, unspecified type (H)     Shortness of breath     Abnormal stress test     Renal colic      Past Surgical History:   Procedure Laterality Date     AMPUTATE TOE(S) Left 10/15/2018    Procedure: AMPUTATE TOE(S);  Left third toe amputation;  Surgeon: Ayaka Azevedo DPM, Podiatry/Foot and Ankle Surgery;  Location: [facility] OR     ARTHROPLASTY KNEE Right 2018    Procedure: Right total knee arthroplasty;  Surgeon: Issa Cunha MD;  Location: [facility] OR     C NONSPECIFIC PROCEDURE      Diastasis recti repair     C NONSPECIFIC PROCEDURE      Ventral hernia repair     C NONSPECIFIC PROCEDURE      ORIF of left shoulder     COLONOSCOPY  3/9/2013    Procedure: COLONOSCOPY;  COLONOSCOPY;  Surgeon: Chau Hogan MD;  Location:  GI     CV HEART CATHETERIZATION WITH POSSIBLE INTERVENTION Left 3/5/2019    Procedure: Coronary Angiogram;  Surgeon: Nas Linda MD;  Location:  HEART CARDIAC CATH LAB     EYE SURGERY  2017    left eye cataract     FOOT SURGERY  2013    cyst removal      HERNIA REPAIR  2004    ventral        Social History     Tobacco Use     Smoking status: Former Smoker     Packs/day: 0.00     Last attempt to quit: 3/17/1993     Years since quittin.8     Smokeless tobacco: Never Used   Substance Use Topics     Alcohol use: Not Currently     Family History   Problem Relation Age of Onset     Family History Negative Mother          86 yo     Cancer Father          76 yo brain     Diabetes Maternal Grandfather          89 yo     Alcohol/Drug Paternal Grandfather                  Current Outpatient Medications   Medication Sig Dispense Refill     amLODIPine (NORVASC) 5 MG tablet Take 1 tablet (5 mg) by mouth daily 90 tablet 3     aspirin (ASA) 81 MG EC tablet Take 1 tablet (81 mg) by mouth daily 100 tablet 3     atorvastatin (LIPITOR) 40 MG tablet Take 1 tablet (40 mg) by mouth daily 90 tablet 3     blood glucose (ACCU-CHEK SMARTVIEW) test strip Use to test blood sugar 1 times daily or as directed. 100 strip 3     blood glucose monitoring  "(ACCU-CHEK FASTCLIX) lancets USE LANCETS 2 TIMES DAILY, AS DIRECTED. 200 each 1     Ferrous Gluconate 240 (27 Fe) MG TABS Take 1 tablet by mouth daily        finasteride (PROSCAR) 5 MG tablet Take 5 mg by mouth daily.       fluticasone (FLONASE) 50 MCG/ACT spray Spray 1 spray into both nostrils nightly as needed        isosorbide mononitrate (IMDUR) 30 MG 24 hr tablet Take 1 tablet (30 mg) by mouth daily 30 tablet 11     labetalol (NORMODYNE) 200 MG tablet Take 1 tablet (200 mg) by mouth 2 times daily 180 tablet 3     lisinopril (PRINIVIL/ZESTRIL) 10 MG tablet Take 3 tablets (30 mg) by mouth daily 270 tablet 0     loratadine (CLARITIN) 10 MG tablet Take 10 mg by mouth as needed        metFORMIN (GLUCOPHAGE) 1000 MG tablet TAKE 1 TABLET BY MOUTH TWICE A DAY WITH MEALS 180 tablet 1     Multiple Vitamins-Minerals (MULTIVITAMIN ADULTS PO) Take 1 tablet by mouth daily        silver sulfADIAZINE (SILVADENE) 1 % external cream Apply topically daily 25 g 1     tamsulosin (FLOMAX) 0.4 MG 24 hr capsule Take 1 capsule by mouth daily.       torsemide (DEMADEX) 20 MG tablet Take 1 tablet (20 mg) by mouth daily 90 tablet 3     Allergies   Allergen Reactions     Penicillins Anaphylaxis     \"anaphylactic\"     Sulfa Drugs      \"itchy rash, swelling of face and hands\"     Ancef [Cefazolin] Rash       Reviewed and updated as needed this visit by Provider       Review of Systems   Constitutional: Positive for appetite change. Negative for fatigue and unexpected weight change.   HENT: Positive for congestion and sneezing.    Respiratory: Positive for cough. Negative for shortness of breath.    Cardiovascular: Negative for chest pain.   Gastrointestinal: Positive for abdominal pain, diarrhea and nausea. Negative for constipation and vomiting.         Objective    /60 (BP Location: Right arm, Patient Position: Sitting, Cuff Size: Adult Large)   Pulse 90   Temp 98.4  F (36.9  C) (Oral)   Resp 24   Ht 1.803 m (5' 11\")   Wt 112.6 " kg (248 lb 3.2 oz)   SpO2 96%   BMI 34.62 kg/m    Body mass index is 34.62 kg/m .  Physical Exam  Constitutional:       Appearance: Normal appearance.   HENT:      Head: Normocephalic.      Mouth/Throat:      Mouth: Mucous membranes are moist.   Eyes:      Pupils: Pupils are equal, round, and reactive to light.   Neck:      Musculoskeletal: Neck supple.   Cardiovascular:      Rate and Rhythm: Normal rate and regular rhythm.      Pulses: Normal pulses.   Pulmonary:      Effort: Pulmonary effort is normal.      Breath sounds: Normal breath sounds. No wheezing.   Abdominal:      Comments: Abdomen is obese, tenderness noted on the central part of the abdomen and the mesh region.  No organomegaly palpable due to body habitus.  Bowel sounds were hypoactive.   Lymphadenopathy:      Cervical: No cervical adenopathy.   Skin:     General: Skin is warm.   Neurological:      General: No focal deficit present.      Mental Status: He is alert.   Psychiatric:         Mood and Affect: Mood normal.        Assessment & Plan     Lance was seen today for uri and diarrhea.    Diagnoses and all orders for this visit:    Cough  -     Influenza A/B antigen  -     CBC with platelets  -     Comprehensive metabolic panel (BMP + Alb, Alk Phos, ALT, AST, Total. Bili, TP)  -     Magnesium  -     TSH with free T4 reflex  -     Clostridium difficile Toxin B PCR; Future  -     CT Abdomen Pelvis w/o Contrast; Future  -     XR Chest 2 Views; Future    Diarrhea, unspecified type  -     Influenza A/B antigen  -     CBC with platelets  -     Comprehensive metabolic panel (BMP + Alb, Alk Phos, ALT, AST, Total. Bili, TP)  -     Magnesium  -     TSH with free T4 reflex  -     Clostridium difficile Toxin B PCR; Future  -     CT Abdomen Pelvis w/o Contrast; Future  -     UA with Microscopic reflex to Culture    Loss of appetite  -     CBC with platelets  -     Comprehensive metabolic panel (BMP + Alb, Alk Phos, ALT, AST, Total. Bili, TP)  -     Magnesium  -      TSH with free T4 reflex  -     Clostridium difficile Toxin B PCR; Future  -     CT Abdomen Pelvis w/o Contrast; Future  -     UA with Microscopic reflex to Culture    Weight loss  -     CBC with platelets  -     Comprehensive metabolic panel (BMP + Alb, Alk Phos, ALT, AST, Total. Bili, TP)  -     Magnesium  -     TSH with free T4 reflex  -     Clostridium difficile Toxin B PCR; Future  -     CT Abdomen Pelvis w/o Contrast; Future  -     UA with Microscopic reflex to Culture    S/P repair of ventral hernia    Iron deficiency anemia, unspecified iron deficiency anemia type  -     Ferritin  -     Iron and iron binding capacity    Will do blood work to check his hemoglobin and electrolytes and kidney function.  We will also do C. difficile testing for diarrhea and ordered a CT abdomen pelvis without contrast due to his CKD.    Patient was advised to go to ER or urgent care if his symptoms are not getting better over the weekend.    Addendum:  Kidney functions have declined compared to the previous one likely secondary to dehydration.  I personally spoke to the patient and advised him to hold metformin, lisinopril, torsemide for next 1 week and follow-up in 1 week to restart the medications.  Blood work also shows iron deficiency anemia.  He will need colonoscopy/GI work-up for iron deficiency anemia after he feels better.  Influenza is negative.  Chest x-ray is negative for pneumonia.  Urine showed bacteria but patient denies any urinary symptoms.  Will wait for CAT scan results to decide further recommendations.    Shu Fournier MD  [facility]

## 2020-01-24 NOTE — NURSING NOTE
"/60 (BP Location: Right arm, Patient Position: Sitting, Cuff Size: Adult Large)   Pulse 90   Temp 98.4  F (36.9  C) (Oral)   Resp 24   Ht 1.803 m (5' 11\")   Wt 112.6 kg (248 lb 3.2 oz)   SpO2 96%   BMI 34.62 kg/m    Kenya Martinez CMA    "

## 2020-01-24 NOTE — PATIENT INSTRUCTIONS
Patient Education     Treating Diarrhea    Diarrhea happens when you have loose, watery, or frequent bowel movements. It is a common problem with many causes. Most cases of diarrhea clear up on their own. But certain cases may need treatment. Be sure to see your healthcare provider if your symptoms do not improve within a few days.  Getting relief  Treatment of diarrhea depends on its cause. Diarrhea caused by bacterial or parasite infection is often treated with antibiotics. Diarrhea caused by other factors, such as a stomach virus, often improves with simple home treatment. The tips below may also help relieve your symptoms.    Drink plenty of fluids. This helps prevent too much fluid loss (dehydration). Water, clear soups, and electrolyte solutions are good choices. Avoid alcohol, coffee, tea, and milk. These can irritate your intestines and make symptoms worse.    Suck on ice chips if drinking makes you queasy.    Return to your normal diet slowly. You may want to eat bland foods at first, such as rice and toast. Also, you may need to avoid certain foods for a while, such as dairy products. These can make symptoms worse. Ask your healthcare provider if there are any other foods you should avoid.    If you were prescribed antibiotics, take them as directed.    Do not take anti-diarrhea medicines without asking your healthcare provider first.  Call your healthcare provider   Call your healthcare provider if you have any of the following:     A fever of 100.4 F (38.0 C) or higher, or as directed by your healthcare provider    Severe pain    Worsening diarrhea or diarrhea for more than 2 days    Bloody vomit or stool    Signs of dehydration (dizziness, dry mouth and tongue, rapid pulse, dark urine)  Date Last Reviewed: 7/1/2016 2000-2019 [email or web address]. [address] [place] [address], [place] [address]. All rights reserved. This information is not intended as a substitute for professional medical care. Always  follow your healthcare professional's instructions.

## 2020-01-25 DIAGNOSIS — R63.0 LOSS OF APPETITE: ICD-10-CM

## 2020-01-25 DIAGNOSIS — R19.7 DIARRHEA, UNSPECIFIED TYPE: ICD-10-CM

## 2020-01-25 DIAGNOSIS — R63.4 WEIGHT LOSS: ICD-10-CM

## 2020-01-25 DIAGNOSIS — R05.9 COUGH: ICD-10-CM

## 2020-01-25 LAB
C DIFF TOX B STL QL: NEGATIVE
SPECIMEN SOURCE: NORMAL

## 2020-01-28 ENCOUNTER — MYC MEDICAL ADVICE (OUTPATIENT)
Dept: INTERNAL MEDICINE | Facility: CLINIC | Age: 75
End: 2020-01-28

## 2020-01-28 ENCOUNTER — OFFICE VISIT (OUTPATIENT)
Dept: PODIATRY | Facility: CLINIC | Age: 75
End: 2020-01-28
Payer: COMMERCIAL

## 2020-01-28 VITALS
SYSTOLIC BLOOD PRESSURE: 112 MMHG | WEIGHT: 248 LBS | HEIGHT: 71 IN | DIASTOLIC BLOOD PRESSURE: 64 MMHG | BODY MASS INDEX: 34.72 KG/M2

## 2020-01-28 DIAGNOSIS — I87.2 EDEMA OF BOTH LOWER LEGS DUE TO PERIPHERAL VENOUS INSUFFICIENCY: ICD-10-CM

## 2020-01-28 DIAGNOSIS — E11.42 DIABETIC POLYNEUROPATHY ASSOCIATED WITH TYPE 2 DIABETES MELLITUS (H): Primary | ICD-10-CM

## 2020-01-28 DIAGNOSIS — R60.0 EDEMA OF BOTH LOWER LEGS DUE TO PERIPHERAL VENOUS INSUFFICIENCY: ICD-10-CM

## 2020-01-28 DIAGNOSIS — M20.21 HALLUX RIGIDUS OF RIGHT FOOT: ICD-10-CM

## 2020-01-28 DIAGNOSIS — L97.512 DIABETIC ULCER OF OTHER PART OF RIGHT FOOT ASSOCIATED WITH TYPE 2 DIABETES MELLITUS, WITH FAT LAYER EXPOSED (H): ICD-10-CM

## 2020-01-28 DIAGNOSIS — E11.621 DIABETIC ULCER OF OTHER PART OF RIGHT FOOT ASSOCIATED WITH TYPE 2 DIABETES MELLITUS, WITH FAT LAYER EXPOSED (H): ICD-10-CM

## 2020-01-28 PROCEDURE — 11042 DBRDMT SUBQ TIS 1ST 20SQCM/<: CPT | Performed by: PODIATRIST

## 2020-01-28 PROCEDURE — 99213 OFFICE O/P EST LOW 20 MIN: CPT | Mod: 25 | Performed by: PODIATRIST

## 2020-01-28 ASSESSMENT — MIFFLIN-ST. JEOR: SCORE: 1882.05

## 2020-01-28 NOTE — PROGRESS NOTES
PATIENT HISTORY:  Lance Ibrahim is a 74 year old male who presents to clinic for chronic ulcer right foot. Notes that it drains every day. Uses silvadene cream on it. No pain due to neuropathy. Denies fever, chills nausa. Notes he wears inserts all the time.     Review of Systems:  Patient denies fever, chills, rash, stiffness, limping, weakness, heart burn, blood in stool, chest pain with activity, calf pain when walking, shortness of breath with activity, chronic cough, easy bleeding/bruising, swelling of ankles, excessive thirst, fatigue, depression, anxiety.  Patient admits to numbness, wound.     PAST MEDICAL HISTORY:   Past Medical History:   Diagnosis Date     Abnormal stress test 3/4/2019    Added automatically from request for surgery [id number]     Arthritis     osteoarthritis knees     CAD (coronary artery disease)     subtotal occlusion in the small distal LAD      Cardiomyopathy (H)      Cerebral artery occlusion with cerebral infarction (H)     TIA 1993, no residual     Chest pain      Chronic rhinitis      ISBELL (dyspnea on exertion)      Dyspnea 02/17/2019     Edema      HTN (hypertension)      Hyperlipidemia      Hypertrophy of prostate with urinary obstruction and other lower urinary tract symptoms (LUTS)      Nephrolithiasis      Orthopnea      PVD (peripheral vascular disease) (H)      Renal colic      S/P cardiac cath 03/05/2019    subtotal occlusion in the small distal LAD      Sleep apnea     doesn't use cpap     TIA (transient ischaemic attack) 1993     Type 2 diabetes mellitus with peripheral vascular disease (H) 11/8/2018     Unspecified transient cerebral ischemia 1993    No definite source found     Ureteral stone         PAST SURGICAL HISTORY:   Past Surgical History:   Procedure Laterality Date     AMPUTATE TOE(S) Left 10/15/2018    Procedure: AMPUTATE TOE(S);  Left third toe amputation;  Surgeon: Ayaka Azevedo DPM, Podiatry/Foot and Ankle Surgery;  Location: [facility] OR     ARTHROPLASTY KNEE  Right 12/7/2018    Procedure: Right total knee arthroplasty;  Surgeon: Issa Cunha MD;  Location: [facility] OR     C NONSPECIFIC PROCEDURE  1985    Diastasis recti repair     C NONSPECIFIC PROCEDURE  1987    Ventral hernia repair     C NONSPECIFIC PROCEDURE      ORIF of left shoulder     COLONOSCOPY  3/9/2013    Procedure: COLONOSCOPY;  COLONOSCOPY;  Surgeon: Chau Hogan MD;  Location:  GI     CV HEART CATHETERIZATION WITH POSSIBLE INTERVENTION Left 3/5/2019    Procedure: Coronary Angiogram;  Surgeon: Nas Linda MD;  Location:  HEART CARDIAC CATH LAB     EYE SURGERY  03/13/2017    left eye cataract     FOOT SURGERY  4/2013    cyst removal      HERNIA REPAIR  7/13/2004    ventral         MEDICATIONS:   Current Outpatient Medications:      amLODIPine (NORVASC) 5 MG tablet, Take 1 tablet (5 mg) by mouth daily, Disp: 90 tablet, Rfl: 3     aspirin (ASA) 81 MG EC tablet, Take 1 tablet (81 mg) by mouth daily, Disp: 100 tablet, Rfl: 3     atorvastatin (LIPITOR) 40 MG tablet, Take 1 tablet (40 mg) by mouth daily, Disp: 90 tablet, Rfl: 3     blood glucose (ACCU-CHEK SMARTVIEW) test strip, Use to test blood sugar 1 times daily or as directed., Disp: 100 strip, Rfl: 3     blood glucose monitoring (ACCU-CHEK FASTCLIX) lancets, USE LANCETS 2 TIMES DAILY, AS DIRECTED., Disp: 200 each, Rfl: 1     colchicine (MITIGARE) 0.6 MG capsule, Take 1 capsule (0.6 mg) by mouth 2 times daily, Disp: 60 capsule, Rfl: 0     Ferrous Gluconate 240 (27 Fe) MG TABS, Take 1 tablet by mouth daily , Disp: , Rfl:      finasteride (PROSCAR) 5 MG tablet, Take 5 mg by mouth daily., Disp: , Rfl:      fluticasone (FLONASE) 50 MCG/ACT spray, Spray 1 spray into both nostrils nightly as needed , Disp: , Rfl:      isosorbide mononitrate (IMDUR) 30 MG 24 hr tablet, Take 1 tablet (30 mg) by mouth daily, Disp: 30 tablet, Rfl: 11     labetalol (NORMODYNE) 200 MG tablet, Take 1 tablet (200 mg) by mouth 2 times daily, Disp: 180 tablet, Rfl: 3      "lisinopril (PRINIVIL/ZESTRIL) 10 MG tablet, Take 3 tablets (30 mg) by mouth daily, Disp: 270 tablet, Rfl: 0     loratadine (CLARITIN) 10 MG tablet, Take 10 mg by mouth as needed , Disp: , Rfl:      metFORMIN (GLUCOPHAGE) 1000 MG tablet, TAKE 1 TABLET BY MOUTH TWICE A DAY WITH MEALS, Disp: 180 tablet, Rfl: 1     Multiple Vitamins-Minerals (MULTIVITAMIN ADULTS PO), Take 1 tablet by mouth daily , Disp: , Rfl:      silver sulfADIAZINE (SILVADENE) 1 % external cream, Apply topically daily, Disp: 25 g, Rfl: 1     tamsulosin (FLOMAX) 0.4 MG 24 hr capsule, Take 1 capsule by mouth daily., Disp: , Rfl:      torsemide (DEMADEX) 20 MG tablet, Take 1 tablet (20 mg) by mouth daily, Disp: 90 tablet, Rfl: 3     ALLERGIES:    Allergies   Allergen Reactions     Penicillins Anaphylaxis     \"anaphylactic\"     Sulfa Drugs      \"itchy rash, swelling of face and hands\"     Ancef [Cefazolin] Rash        SOCIAL HISTORY:   Social History     Socioeconomic History     Marital status:      Spouse name: Not on file     Number of children: Not on file     Years of education: Not on file     Highest education level: Not on file   Occupational History     Employer: SELF   Social Needs     Financial resource strain: Not on file     Food insecurity:     Worry: Not on file     Inability: Not on file     Transportation needs:     Medical: Not on file     Non-medical: Not on file   Tobacco Use     Smoking status: Former Smoker     Packs/day: 0.00     Last attempt to quit: 3/17/1993     Years since quittin.8     Smokeless tobacco: Never Used   Substance and Sexual Activity     Alcohol use: Not Currently     Drug use: No     Sexual activity: Never   Lifestyle     Physical activity:     Days per week: Not on file     Minutes per session: Not on file     Stress: Not on file   Relationships     Social connections:     Talks on phone: Not on file     Gets together: Not on file     Attends Evangelical service: Not on file     Active member of club " "or organization: Not on file     Attends meetings of clubs or organizations: Not on file     Relationship status: Not on file     Intimate partner violence:     Fear of current or ex partner: Not on file     Emotionally abused: Not on file     Physically abused: Not on file     Forced sexual activity: Not on file   Other Topics Concern     Parent/sibling w/ CABG, MI or angioplasty before 65F 55M? Not Asked   Social History Narrative     Not on file        FAMILY HISTORY:   Family History   Problem Relation Age of Onset     Family History Negative Mother          86 yo     Cancer Father          74 yo brain     Diabetes Maternal Grandfather          91 yo     Alcohol/Drug Paternal Grandfather                 EXAM:Vitals: /64   Ht 1.803 m (5' 11\")   Wt 112.5 kg (248 lb)   BMI 34.59 kg/m    BMI= Body mass index is 34.59 kg/m .     A1C: 6.3 (3/2019)    General appearance: Patient is alert and fully cooperative with history & exam.  No sign of distress is noted during the visit.     Psychiatric: Affect is pleasant & appropriate.  Patient appears motivated to improve health.     Respiratory: Breathing is regular & unlabored while sitting.     HEENT: Hearing is intact to spoken word.  Speech is clear.  No gross evidence of visual impairment that would impact ambulation.     Vascular:  DP and PT pulses are palpable.  Edema and varicosities noted.  CFT's < 3secs.  Skin temp is normal      Neuro:  Light and gross touch sensation is absent to feet.      Derm:  Full thickness ulcer to the plantar right interphalangeal joint measures 1.2cm x 0.4cm x 0.2cm after debridement (same).  base of wound is granular. No redness, purulent drainage or malodor noted.      Musculoskeletal:  Decrease arch height. Lateral deviation of the right and left halluxes.     Assessment:     Diabetic polyneuropathy associated with type 2 diabetes mellitus (H)  Diabetic ulcer of other part of right foot " associated with type 2 diabetes mellitus, with fat layer exposed (H)  Hallux rigidus of right foot  Edema of both lower legs due to peripheral venous insufficiency     Plan:  Wound was debrided. Patient only in shoes, not offloading.  Discussed that we need to get pressure off the area for the wound to heal. talked about diabetic shoes and inserts. He declined. Insert was modified with felted foam to try to get pressure off of the area. REcommend manuka honey.  Recommend compression, was given tensogrip.       Follow up in 4 weeks. If he develops systemic signs of infection, recommend going to Emergency department.      Procedure: After verbal consent, excisional debridement was performed on ulcer.  #15 blade was used to debride ulcer down to and including subcutaneous tissue. Bleeding controlled with light pressure.   No drainage noted.  No anesthesia was used due to neuropathy. Dry dressing applied to foot.  Patient tolerated procedure well.     Ayaka Azevedo DPM, Podiatry/Foot and Ankle Surgery    Weight management plan: Patient was referred to their PCP to discuss a diet and exercise plan.    Recommended to Lance Ibrahim to follow up with Primary Care provider regarding elevated blood pressure.

## 2020-01-28 NOTE — PATIENT INSTRUCTIONS
Thank you for choosing [facility] Podiatry / Foot & Ankle Surgery!    DR. RANDHAWA'S CLINIC SCHEDULE  MONDAY AM - [place] TUESDAY - [place]   [address] [address] [place] [address] [place] [address]   [phone] / [phone] / [phone]       WEDNESDAY - [place] FRIDAY AM - WOUND CENTER   [address]   [place] [address] [place] [address]   [phone] / [phone]       FRIDAY PM - [place] SCHEDULE SURGERY: [phone]   [address] BILLING QUESTIONS: [phone]   [place] [address] AFTER HOURS: [phone]   [phone] / [phone] APPOINTMENTS: [phone]     Consumer Price Line (CPL) [phone]     FOLLOW UP:  One month      FOOT ULCER (WOUND) EDUCATION  Ulceration ofthe foot involves a break or hole in the skin. Skin is our best protection against infection. Skin is quite durable, however, the underlying tissues are fragile. For this reason, the wound is likely to deepen rapidly. Deep wounds usually get infected and require amputation. Prompt healing is therefore essential to avoid limb loss.     Foot ulcers do not heal without intervention. Walking on the foot and living your normal life is not typically compatible with healing the sore. Successful healing will require several months of significant alteration of your daily activities.   Ulcer complications frequently develop. This primarily includes infection of skin, which then spreads deep into your joints, bones and tendons. Spreading infection may travel up your leg and into other parts of your body. Deep infection is usually treated with amputation ofpart ofyour foot or your leg. Signs of infection include fever, chills, nausea, vomiting, erratic blood sugars, local redness, pus, strong odor and localized warmth. Signs of infection should be taken seriously. Prompt evaluation in the clinic or hospital emergency room is  required.   Ulcer treatment requires debridement or surgical removal of devitalized tissue. Your doctor will trim away callused, moistened, unhealthy tissue from the wound surface and margin. This helps to clean the wound and allows proper inspection. Debridement also stimulates healing even though the wound originally appears larger. Expect some bleeding with each debridement. You will be given instruction regarding wound bandaging. This often includes ointment and gauze. Avoid tape directly on the skin. Hand washing is essential since most infections will come from your fingertips. Ulcer care requires a no touch technique. Your fingers should not touch the margin or base of the wound.    HELPFUL HEALING TIPS:  1. Debridement: Getting rid of bad tissue makes way for good tissue to promote healing  2. Addressing Foot Deformities: Hammertoes and bunions can cause increased pressure  3. Pressure Reduction: If pressure remains to the wound, it won't heal  4. Good Pulses: If bloodflow is not getting to the foot, the ulcer will not heal  5. Good Nutrition: If you are not getting proper nutrition your body can't heal.Protein!  6. Infection Control: Keep the ulcer clean with wound cleanser. DO NOT SOAK IT!  7. Moisture Control: Keep edema down and make sure that drainage is getting pulled away from the ulcer    IMPORTANCE OF DEBRIDEMENT    Reduces bioburden to help control or reduce infection. Even if an ulcer is not  infected,  the bacterial bioburden causes increased local inflammation.    Allows more accurate visualization of the wound base and edges, which allows for more precise staging.    Removes necrotic/non-viable tissue, which impedes wound healing, causes protein loss and can be a nidus for infection.    Stimulates new circulation (angiogenesis) and allows adequate oxygen delivery to the wound.    Removes undermining and tunneling, and may help reduce abscess formation.    Releases healing growth factors at the  "edge of the wound.    Prepares the wound bed by leaving only tissues that are capable of regenerating.        DIABETES AND YOUR FEET  Diabetes can result in several problems in the feet including ulcers (open sores) and amputations. Two of the most important reasons why people develop foot problems when they have diabetes is : 1. Neuropathy (loss of feeling)  2. Vascular disease (loss or decrease of blood flow).    Neuropathy is a term used to describe a loss of nerve function.  Patients with diabetes are at risk of developing neuropathy if their sugars continue to run high and are above the normal value. One theory for neuropathy is that the \"extra\" sugar in the body enters the nerves and is broken down. These by-products build up in the nerve causing it to swell and impairing nerve function. Often times, this can be prevented by controlling your sugars, dieting and exercise.    When a person develops neuropathy, they usually begin to feel numbness or tingling in their feet and sometime in their legs.  Other symptoms may include painful burning or hot feet, tingling or feeling like insects or ants are crawling on your feet or legs.  If the diabetes is sever and the sugars run high for long periods of time, neuropathy can also occur in the hands.    Vascular disease  is a term used to describe a loss or decrease in circulation (blood flow). There is a problem in getting blood and oxygen to areas that need it. Similar to neuropathy, sugars can build up in the walls of the arteries (blood vessels) and cause them to become swollen, thickened and hardened. This decreases the amount of blood that can go to an area that needs it. Though this is common in the legs of diabetic patients, it can also affect other arteries (blood vessels) in the body such as in the heart and eyes.    In the legs, vascular disease usually results in cramping. Patients who develop leg cramps after walking the same distance every time (i.e. One " block, half a mile, ect.) need to let their doctors know so that their circulation may be checked. Cramps causing severe pain in the feet and/or legs while sleeping and the cramps go away when you stand or hang your legs off the side of the bed, may also be a sign of poor blood circulation.  Occasional cramping in cold weather or on rare occasions with activity may not be due to poor circulation, but you should inform your doctor.    PREVENTION OF THESE DISEASES  The key to prevention is good blood sugar control. Poor blood sugar control is a big reason many of these problems start. Physical activity (exercise) is a very good way to help decrease your blood sugars. Exercise can lower your blood sugar, blood pressure, and cholesterol. It also reduces your risk for heart disease and stroke, relieves stress, and strengthens your heart, muscles and bones.  In addition, regular activity helps insulin work better, improves your blood circulation, and keeps your joints flexible. If you're trying to lose weight, a combination of exercise and wise food choices can help you reach your target weight and maintain it.      PAIN MANAGEMENT  1.Blood Sugar Control - Most important  2. Medications such as:  Amytriptylline, duloxetine, gabapentin, lyrica, tramadol  3. Nutritional therapy:  Vitamin B6 (100mg daily), Vitamin B12 (75mcg daily), Vitamin D 2000 IU daily), Alpha-Lipoic Acid (600-1800mg daily), Acetyl-L-Carnitine (500-1000mg TID, L-methyl folate (1500mcg daily)    ** Metformin can block Vitamin B6 and B12 so it is important to supplement**    FOOT CARE RECOMMENDATIONS   1. Wash your feet with lukewarm water and a mild soap and then dry them thoroughly, especially between the toes.     2. Examine your feet daily looking for cuts, corns, blisters, cracks, ect, especially after wearing new shoes. Make sure to look between your toes. If you cannot see the bottom of your feet, set a mirror on the floor and hold your foot over  it, or ask a spouse, friend or family member to examine your feet for you. Contact your doctor immediately if new problems are noted or if sores are not healing.     3. Immediately apply moisturizer to the tops and bottoms of your feet, avoiding areas between the toes. Hand lotion (Intesive Care, Marta, Eucerin, Neutrogena, Curel, ect) is sufficient unless your doctor prescribes a medicated lotion. Apply sunscreen to your feet when going swimming outside.     4. Use clean comfortable shoes, wear white socks (if you have any bleeding or drainage, you will see it on white socks). Socks should not have thick seams or cut off the circulation around the leg. Break in new shoes slowly and rotate with older shoes until broken in. Check the inside of your shoes with your hand to look for areas of irritation or objects that may have fallen into your shoes.       5. Keep slippers by the side of your bed for use during the night.     6.  Shoes should be fitted by a professional and should not cause areas of irritation.  Check your feet regularly when wearing a new pair of shoes and replace them as needed.     7.  Talk to your doctor about proper exercise. Exercise and stretching stimulate blood flow to your feet and maintain proper glucose levels.     8.  Monitor your blood glucose level as instructed by your doctor. Notify your doctor immediately if your blood sugar is abnormally high or low.    9. Cut your nails straight across, but then gently round any sharp edges with a cardboard nail file. If you have neuropathy, peripheral vascular disease or cannot see that well to trim your own toenails contact [facility] ([phone]) or [facility] [employer] ([phone]).      THINGS TO AVOID DOING   1.  Do not soak your feet if you have an open sore. Use only lukewarm water and always check the temperature with your hand as hot water can easily burn your feet.       2.  Never use a hot water bottle or heating pad on your feet. Also do  not apply cold compresses to your feet. With decreased sensation, you could burn or freeze your feet.       3.  Do not apply any of these to your feet:    -  Over the counter medicine for corns or warts    -  Harsh chemicals like boric acid    -  Do not self-treat corns, cuts, blisters or infections. Always consult your doctor.       4.  Do not wear sandals, slippers or walk barefoot, especially on hot sand or concrete or other harsh surfaces.     5.  If you smoke, stop!!!             BODY WEIGHT AND YOUR FEET  The following information is included in the after visit summary for all patients. Body weight can be a sensitive issue to discuss in clinic, but we think the following information is very important. Although we focus on the feet and ankles, we do support the overall health of our patients.     Many things can cause foot and ankle problems. Foot structure, activity level, foot mechanics and injuries are common causes of pain. One very important issue that often goes unmentioned, is body weight. Extra weight can cause increased stress on muscles, ligaments, bones and tendons. Sometimes just a few extra pounds is all it takes to put one over her/his threshold. Without reducing that stress, it can be difficult to alleviate pain. As Foot & Ankle specialists, our job is addressing the lower extremity problem and possible causes. Regarding extra body weight, we encourage patients to discuss diet and weight management plans with their primary care doctors. It is this team approach that gives you the best opportunity for pain relief and getting you back on your feet.      [place] has a Comprehensive Weight Management Program. This program includes counseling, education, non-surgical and surgical approaches to weight loss. If you are interested in learning more either talk to you primary care provider or call [phone].

## 2020-01-28 NOTE — LETTER
1/28/2020         RE: Lance Ibrahim  [address] [place] [address]        Dear Colleague,    Thank you for referring your patient, Lnace Ibrahim, to the [facility]. Please see a copy of my visit note below.    PATIENT HISTORY:  Lance Ibrahim is a 74 year old male who presents to clinic for chronic ulcer right foot. Notes that it drains every day. Uses silvadene cream on it. No pain due to neuropathy. Denies fever, chills nausa. Notes he wears inserts all the time.     Review of Systems:  Patient denies fever, chills, rash, stiffness, limping, weakness, heart burn, blood in stool, chest pain with activity, calf pain when walking, shortness of breath with activity, chronic cough, easy bleeding/bruising, swelling of ankles, excessive thirst, fatigue, depression, anxiety.  Patient admits to numbness, wound.     PAST MEDICAL HISTORY:   Past Medical History:   Diagnosis Date     Abnormal stress test 3/4/2019    Added automatically from request for surgery [id number]     Arthritis     osteoarthritis knees     CAD (coronary artery disease)     subtotal occlusion in the small distal LAD      Cardiomyopathy (H)      Cerebral artery occlusion with cerebral infarction (H)     TIA 1993, no residual     Chest pain      Chronic rhinitis      ISBELL (dyspnea on exertion)      Dyspnea 02/17/2019     Edema      HTN (hypertension)      Hyperlipidemia      Hypertrophy of prostate with urinary obstruction and other lower urinary tract symptoms (LUTS)      Nephrolithiasis      Orthopnea      PVD (peripheral vascular disease) (H)      Renal colic      S/P cardiac cath 03/05/2019    subtotal occlusion in the small distal LAD      Sleep apnea     doesn't use cpap     TIA (transient ischaemic attack) 1993     Type 2 diabetes mellitus with peripheral vascular disease (H) 11/8/2018     Unspecified transient cerebral ischemia 1993    No definite source found     Ureteral stone         PAST SURGICAL HISTORY:   Past  Surgical History:   Procedure Laterality Date     AMPUTATE TOE(S) Left 10/15/2018    Procedure: AMPUTATE TOE(S);  Left third toe amputation;  Surgeon: Ayaka Azevedo DPM, Podiatry/Foot and Ankle Surgery;  Location: [facility] OR     ARTHROPLASTY KNEE Right 12/7/2018    Procedure: Right total knee arthroplasty;  Surgeon: Issa Cunha MD;  Location: [facility] OR     C NONSPECIFIC PROCEDURE  1985    Diastasis recti repair     C NONSPECIFIC PROCEDURE  1987    Ventral hernia repair     C NONSPECIFIC PROCEDURE      ORIF of left shoulder     COLONOSCOPY  3/9/2013    Procedure: COLONOSCOPY;  COLONOSCOPY;  Surgeon: Chau Hogan MD;  Location:  GI     CV HEART CATHETERIZATION WITH POSSIBLE INTERVENTION Left 3/5/2019    Procedure: Coronary Angiogram;  Surgeon: Nas Linda MD;  Location:  HEART CARDIAC CATH LAB     EYE SURGERY  03/13/2017    left eye cataract     FOOT SURGERY  4/2013    cyst removal      HERNIA REPAIR  7/13/2004    ventral         MEDICATIONS:   Current Outpatient Medications:      amLODIPine (NORVASC) 5 MG tablet, Take 1 tablet (5 mg) by mouth daily, Disp: 90 tablet, Rfl: 3     aspirin (ASA) 81 MG EC tablet, Take 1 tablet (81 mg) by mouth daily, Disp: 100 tablet, Rfl: 3     atorvastatin (LIPITOR) 40 MG tablet, Take 1 tablet (40 mg) by mouth daily, Disp: 90 tablet, Rfl: 3     blood glucose (ACCU-CHEK SMARTVIEW) test strip, Use to test blood sugar 1 times daily or as directed., Disp: 100 strip, Rfl: 3     blood glucose monitoring (ACCU-CHEK FASTCLIX) lancets, USE LANCETS 2 TIMES DAILY, AS DIRECTED., Disp: 200 each, Rfl: 1     colchicine (MITIGARE) 0.6 MG capsule, Take 1 capsule (0.6 mg) by mouth 2 times daily, Disp: 60 capsule, Rfl: 0     Ferrous Gluconate 240 (27 Fe) MG TABS, Take 1 tablet by mouth daily , Disp: , Rfl:      finasteride (PROSCAR) 5 MG tablet, Take 5 mg by mouth daily., Disp: , Rfl:      fluticasone (FLONASE) 50 MCG/ACT spray, Spray 1 spray into both nostrils nightly as needed , Disp: ,  "Rfl:      isosorbide mononitrate (IMDUR) 30 MG 24 hr tablet, Take 1 tablet (30 mg) by mouth daily, Disp: 30 tablet, Rfl: 11     labetalol (NORMODYNE) 200 MG tablet, Take 1 tablet (200 mg) by mouth 2 times daily, Disp: 180 tablet, Rfl: 3     lisinopril (PRINIVIL/ZESTRIL) 10 MG tablet, Take 3 tablets (30 mg) by mouth daily, Disp: 270 tablet, Rfl: 0     loratadine (CLARITIN) 10 MG tablet, Take 10 mg by mouth as needed , Disp: , Rfl:      metFORMIN (GLUCOPHAGE) 1000 MG tablet, TAKE 1 TABLET BY MOUTH TWICE A DAY WITH MEALS, Disp: 180 tablet, Rfl: 1     Multiple Vitamins-Minerals (MULTIVITAMIN ADULTS PO), Take 1 tablet by mouth daily , Disp: , Rfl:      silver sulfADIAZINE (SILVADENE) 1 % external cream, Apply topically daily, Disp: 25 g, Rfl: 1     tamsulosin (FLOMAX) 0.4 MG 24 hr capsule, Take 1 capsule by mouth daily., Disp: , Rfl:      torsemide (DEMADEX) 20 MG tablet, Take 1 tablet (20 mg) by mouth daily, Disp: 90 tablet, Rfl: 3     ALLERGIES:    Allergies   Allergen Reactions     Penicillins Anaphylaxis     \"anaphylactic\"     Sulfa Drugs      \"itchy rash, swelling of face and hands\"     Ancef [Cefazolin] Rash        SOCIAL HISTORY:   Social History     Socioeconomic History     Marital status:      Spouse name: Not on file     Number of children: Not on file     Years of education: Not on file     Highest education level: Not on file   Occupational History     Employer: SELF   Social Needs     Financial resource strain: Not on file     Food insecurity:     Worry: Not on file     Inability: Not on file     Transportation needs:     Medical: Not on file     Non-medical: Not on file   Tobacco Use     Smoking status: Former Smoker     Packs/day: 0.00     Last attempt to quit: 3/17/1993     Years since quittin.8     Smokeless tobacco: Never Used   Substance and Sexual Activity     Alcohol use: Not Currently     Drug use: No     Sexual activity: Never   Lifestyle     Physical activity:     Days per week: Not " "on file     Minutes per session: Not on file     Stress: Not on file   Relationships     Social connections:     Talks on phone: Not on file     Gets together: Not on file     Attends Mosque service: Not on file     Active member of club or organization: Not on file     Attends meetings of clubs or organizations: Not on file     Relationship status: Not on file     Intimate partner violence:     Fear of current or ex partner: Not on file     Emotionally abused: Not on file     Physically abused: Not on file     Forced sexual activity: Not on file   Other Topics Concern     Parent/sibling w/ CABG, MI or angioplasty before 65F 55M? Not Asked   Social History Narrative     Not on file        FAMILY HISTORY:   Family History   Problem Relation Age of Onset     Family History Negative Mother          86 yo     Cancer Father          74 yo brain     Diabetes Maternal Grandfather          91 yo     Alcohol/Drug Paternal Grandfather                 EXAM:Vitals: /64   Ht 1.803 m (5' 11\")   Wt 112.5 kg (248 lb)   BMI 34.59 kg/m     BMI= Body mass index is 34.59 kg/m .     A1C: 6.3 (3/2019)    General appearance: Patient is alert and fully cooperative with history & exam.  No sign of distress is noted during the visit.     Psychiatric: Affect is pleasant & appropriate.  Patient appears motivated to improve health.     Respiratory: Breathing is regular & unlabored while sitting.     HEENT: Hearing is intact to spoken word.  Speech is clear.  No gross evidence of visual impairment that would impact ambulation.     Vascular:  DP and PT pulses are palpable.  Edema and varicosities noted.  CFT's < 3secs.  Skin temp is normal      Neuro:  Light and gross touch sensation is absent to feet.      Derm:  Full thickness ulcer to the plantar right interphalangeal joint measures 1.2cm x 0.4cm x 0.2cm after debridement (same).  base of wound is granular. No redness, purulent drainage or malodor " noted.      Musculoskeletal:  Decrease arch height. Lateral deviation of the right and left halluxes.     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 Patient only in shoes, not offloading.  Discussed that we need to get pressure off the area for the wound to heal. talked about diabetic shoes and inserts. He declined. Insert was modified with felted foam to try to get pressure off of the area. REcommend manuka honey.  Recommend compression, was given tensogrip.       Follow up in 4 weeks. If he develops systemic signs of infection, recommend going to Emergency department.      Procedure: After verbal consent, excisional debridement was performed on ulcer.  #15 blade was used to debride ulcer down to and including subcutaneous tissue. Bleeding controlled with light pressure.   No drainage noted.  No anesthesia was used due to neuropathy. Dry dressing applied to foot.  Patient tolerated procedure well.     Ayaka Azevedo DPM, Podiatry/Foot and Ankle Surgery    Weight management plan: Patient was referred to their PCP to discuss a diet and exercise plan.    Recommended to Lance Ibrahim to follow up with Primary Care provider regarding elevated blood pressure.        Again, thank you for allowing me to participate in the care of your patient.        Sincerely,        Ayaka Azevedo DPM, Podiatry/Foot and Ankle Surgery

## 2020-01-29 ENCOUNTER — TELEPHONE (OUTPATIENT)
Dept: INTERNAL MEDICINE | Facility: CLINIC | Age: 75
End: 2020-01-29

## 2020-01-29 NOTE — TELEPHONE ENCOUNTER
See message below. Should pt schedule an OV?   Or just do BMP lab appt?       Creatinine   Date Value Ref Range Status   01/24/2020 1.78 (H) 0.66 - 1.25 mg/dL Final     GFR Estimate   Date Value Ref Range Status   01/24/2020 36 (L) >60 mL/min/[1.73_m2] Final     Comment:     Non  GFR Calc  Starting 12/18/2018, serum creatinine based estimated GFR (eGFR) will be   calculated using the Chronic Kidney Disease Epidemiology Collaboration   (CKD-EPI) equation.       GFR Estimate If Black   Date Value Ref Range Status   01/24/2020 42 (L) >60 mL/min/[1.73_m2] Final     Comment:      GFR Calc  Starting 12/18/2018, serum creatinine based estimated GFR (eGFR) will be   calculated using the Chronic Kidney Disease Epidemiology Collaboration   (CKD-EPI) equation.

## 2020-01-29 NOTE — TELEPHONE ENCOUNTER
Patient was advised at 01/24/20 OV to hold on Metformin, Lisinopril, and Torsemide.  Patient states he is back to his old self and is wondering when he should re start medications.  No cough, runny  Nose, and normal bowel movements.  Please advise.

## 2020-02-05 ENCOUNTER — OFFICE VISIT (OUTPATIENT)
Dept: INTERNAL MEDICINE | Facility: CLINIC | Age: 75
End: 2020-02-05
Payer: COMMERCIAL

## 2020-02-05 VITALS
DIASTOLIC BLOOD PRESSURE: 72 MMHG | SYSTOLIC BLOOD PRESSURE: 140 MMHG | OXYGEN SATURATION: 94 % | RESPIRATION RATE: 16 BRPM | HEIGHT: 71 IN | BODY MASS INDEX: 36.08 KG/M2 | HEART RATE: 112 BPM | WEIGHT: 257.7 LBS | TEMPERATURE: 97.9 F

## 2020-02-05 DIAGNOSIS — M10.9 ACUTE GOUT OF LEFT HAND, UNSPECIFIED CAUSE: ICD-10-CM

## 2020-02-05 DIAGNOSIS — N18.30 CKD (CHRONIC KIDNEY DISEASE) STAGE 3, GFR 30-59 ML/MIN (H): ICD-10-CM

## 2020-02-05 DIAGNOSIS — N17.9 ACUTE RENAL FAILURE, UNSPECIFIED ACUTE RENAL FAILURE TYPE (H): ICD-10-CM

## 2020-02-05 DIAGNOSIS — E66.01 MORBID OBESITY (H): ICD-10-CM

## 2020-02-05 DIAGNOSIS — E11.9 TYPE 2 DIABETES, HBA1C GOAL < 7% (H): ICD-10-CM

## 2020-02-05 DIAGNOSIS — I10 ESSENTIAL HYPERTENSION: Primary | ICD-10-CM

## 2020-02-05 DIAGNOSIS — E11.9 TYPE 2 DIABETES MELLITUS WITHOUT COMPLICATION, WITHOUT LONG-TERM CURRENT USE OF INSULIN (H): ICD-10-CM

## 2020-02-05 PROBLEM — R94.39 ABNORMAL STRESS TEST: Status: RESOLVED | Noted: 2019-03-04 | Resolved: 2020-02-05

## 2020-02-05 PROBLEM — L08.9 DIABETIC FOOT INFECTION (H): Status: RESOLVED | Noted: 2018-10-14 | Resolved: 2020-02-05

## 2020-02-05 PROBLEM — L03.90 CELLULITIS: Status: RESOLVED | Noted: 2018-06-19 | Resolved: 2020-02-05

## 2020-02-05 PROBLEM — E11.628 DIABETIC FOOT INFECTION (H): Status: RESOLVED | Noted: 2018-10-14 | Resolved: 2020-02-05

## 2020-02-05 PROCEDURE — 80048 BASIC METABOLIC PNL TOTAL CA: CPT | Performed by: INTERNAL MEDICINE

## 2020-02-05 PROCEDURE — 36415 COLL VENOUS BLD VENIPUNCTURE: CPT | Performed by: INTERNAL MEDICINE

## 2020-02-05 PROCEDURE — 99214 OFFICE O/P EST MOD 30 MIN: CPT | Performed by: INTERNAL MEDICINE

## 2020-02-05 RX ORDER — PREDNISONE 20 MG/1
40 TABLET ORAL DAILY
Qty: 14 TABLET | Refills: 0 | Status: SHIPPED | OUTPATIENT
Start: 2020-02-05 | End: 2020-02-19

## 2020-02-05 ASSESSMENT — MIFFLIN-ST. JEOR: SCORE: 1926.05

## 2020-02-05 NOTE — PROGRESS NOTES
Subjective     Lance Ibrahim is a 74 year old male who presents to clinic today for the following health issues:    HPI   Hypertension Follow-up      Do you check your blood pressure regularly outside of the clinic? Yes     Are you following a low salt diet? Yes    Are your blood pressures ever more than 140 on the top number (systolic) OR more   than 90 on the bottom number (diastolic), for example 140/90? Yes      How many servings of fruits and vegetables do you eat daily?  0-1    On average, how many sweetened beverages do you drink each day (Examples: soda, juice, sweet tea, etc.  Do NOT count diet or artificially sweetened beverages)?   2    How many days per week do you exercise enough to make your heart beat faster? 3 or less    How many minutes a day do you exercise enough to make your heart beat faster? 9 or less    How many days per week do you miss taking your medication? 0  Patient has hypertension and takes multiple medication.  Blood pressure has been running little high after I held her lisinopril 30 mg torsemide 20 mg.  Patient had acute kidney injury with diarrhea.  Blood sugars are also running around 1 30-1 50 in the morning after holding metformin.  Denies chest pain, shortness breath, palpitation.  Patient had weight gain but has been gradually gaining his normal weight after he got better with diarrhea.  No heart failure symptoms.  Patient has history of gout and noticed significant swelling in the left middle finger which is tender to touch for last few days.  Colchicine does not work for him and he sensitive to that.  Patient has taken prednisone for gout in the past.  Patient is also morbidly obese and his baseline weight is around 250 and has been getting close to his baseline weight.    Patient Active Problem List   Diagnosis     Ventral hernia     Chronic rhinitis     Hypertrophy of prostate with urinary obstruction     Transient cerebral ischemia     Essential hypertension      CARDIOVASCULAR SCREENING; LDL GOAL LESS THAN 100     Gout     Type 2 diabetes, HbA1c goal < 7% (H)     Cervicalgia     Amputated toe, left (H)     Type 2 diabetes mellitus with peripheral vascular disease (H)     Total knee replacement status     Dyspnea on exertion     Chest pain, unspecified type     Cardiomyopathy, unspecified type (H)     Shortness of breath     Renal colic     Obesity (BMI 35.0-39.9) with comorbidity (H)     CKD (chronic kidney disease) stage 3, GFR 30-59 ml/min (H)     Past Surgical History:   Procedure Laterality Date     AMPUTATE TOE(S) Left 10/15/2018    Procedure: AMPUTATE TOE(S);  Left third toe amputation;  Surgeon: Ayaka Azevedo DPM, Podiatry/Foot and Ankle Surgery;  Location: [facility] OR     ARTHROPLASTY KNEE Right 2018    Procedure: Right total knee arthroplasty;  Surgeon: Issa Cunha MD;  Location: [facility] OR     C NONSPECIFIC PROCEDURE      Diastasis recti repair     C NONSPECIFIC PROCEDURE      Ventral hernia repair     C NONSPECIFIC PROCEDURE      ORIF of left shoulder     COLONOSCOPY  3/9/2013    Procedure: COLONOSCOPY;  COLONOSCOPY;  Surgeon: Chau Hogan MD;  Location:  GI     CV HEART CATHETERIZATION WITH POSSIBLE INTERVENTION Left 3/5/2019    Procedure: Coronary Angiogram;  Surgeon: Nas Linda MD;  Location:  HEART CARDIAC CATH LAB     EYE SURGERY  2017    left eye cataract     FOOT SURGERY  2013    cyst removal      HERNIA REPAIR  2004    ventral        Social History     Tobacco Use     Smoking status: Former Smoker     Packs/day: 0.00     Last attempt to quit: 3/17/1993     Years since quittin.9     Smokeless tobacco: Never Used   Substance Use Topics     Alcohol use: Not Currently     Family History   Problem Relation Age of Onset     Family History Negative Mother          88 yo     Cancer Father          76 yo brain     Diabetes Maternal Grandfather          89 yo     Alcohol/Drug Paternal Grandfather  "                 Current Outpatient Medications   Medication Sig Dispense Refill     amLODIPine (NORVASC) 5 MG tablet Take 1 tablet (5 mg) by mouth daily 90 tablet 3     aspirin (ASA) 81 MG EC tablet Take 1 tablet (81 mg) by mouth daily 100 tablet 3     atorvastatin (LIPITOR) 40 MG tablet Take 1 tablet (40 mg) by mouth daily 90 tablet 3     blood glucose (ACCU-CHEK SMARTVIEW) test strip Use to test blood sugar 1 times daily or as directed. 100 strip 3     blood glucose monitoring (ACCU-CHEK FASTCLIX) lancets USE LANCETS 2 TIMES DAILY, AS DIRECTED. 200 each 1     Ferrous Gluconate 240 (27 Fe) MG TABS Take 1 tablet by mouth daily        finasteride (PROSCAR) 5 MG tablet Take 5 mg by mouth daily.       fluticasone (FLONASE) 50 MCG/ACT spray Spray 1 spray into both nostrils nightly as needed        isosorbide mononitrate (IMDUR) 30 MG 24 hr tablet Take 1 tablet (30 mg) by mouth daily 30 tablet 11     labetalol (NORMODYNE) 200 MG tablet Take 1 tablet (200 mg) by mouth 2 times daily 180 tablet 3     loratadine (CLARITIN) 10 MG tablet Take 10 mg by mouth as needed        Multiple Vitamins-Minerals (MULTIVITAMIN ADULTS PO) Take 1 tablet by mouth daily        predniSONE (DELTASONE) 20 MG tablet Take 2 tablets (40 mg) by mouth daily for 7 days 14 tablet 0     silver sulfADIAZINE (SILVADENE) 1 % external cream Apply topically daily 25 g 1     tamsulosin (FLOMAX) 0.4 MG 24 hr capsule Take 1 capsule by mouth daily.       lisinopril (PRINIVIL/ZESTRIL) 10 MG tablet Take 3 tablets (30 mg) by mouth daily (Patient not taking: Reported on 2020) 270 tablet 0     metFORMIN (GLUCOPHAGE) 1000 MG tablet TAKE 1 TABLET BY MOUTH TWICE A DAY WITH MEALS (Patient not taking: Reported on 2020) 180 tablet 1     torsemide (DEMADEX) 20 MG tablet Take 1 tablet (20 mg) by mouth daily (Patient not taking: Reported on 2020) 90 tablet 3     Allergies   Allergen Reactions     Penicillins Anaphylaxis     \"anaphylactic\"     Sulfa Drugs  " "    \"itchy rash, swelling of face and hands\"     Ancef [Cefazolin] Rash       Reviewed and updated as needed this visit by Provider       Review of Systems   Constitutional: Negative for fatigue.   Respiratory: Negative for shortness of breath.    Cardiovascular: Negative for chest pain, palpitations and peripheral edema.   Gastrointestinal: Negative for diarrhea, nausea and vomiting.   Genitourinary: Negative for dysuria.   Neurological: Negative for dizziness.         Objective    BP (!) 140/72 (BP Location: Right arm, Patient Position: Sitting, Cuff Size: Adult Large)   Pulse 112   Temp 97.9  F (36.6  C) (Oral)   Resp 16   Ht 1.803 m (5' 11\")   Wt 116.9 kg (257 lb 11.2 oz)   SpO2 94%   BMI 35.94 kg/m    Body mass index is 35.94 kg/m .  Physical Exam  Constitutional:       Appearance: Normal appearance.   HENT:      Head: Normocephalic and atraumatic.   Neck:      Musculoskeletal: Neck supple.   Cardiovascular:      Rate and Rhythm: Normal rate and regular rhythm.      Pulses: Normal pulses.   Pulmonary:      Effort: Pulmonary effort is normal.      Breath sounds: Normal breath sounds. No wheezing.   Skin:     General: Skin is warm.      Comments: Swollen erythematous left middle finger noted.  It was tender to palpate and could not assess range of motion.   Neurological:      General: No focal deficit present.      Mental Status: He is alert.   Psychiatric:         Mood and Affect: Mood normal.          Assessment & Plan     Lance was seen today for follow up.    Diagnoses and all orders for this visit:    Essential hypertension    Acute renal failure, unspecified acute renal failure type (H)  -     Basic metabolic panel  (Ca, Cl, CO2, Creat, Gluc, K, Na, BUN)    CKD (chronic kidney disease) stage 3, GFR 30-59 ml/min (H)  -     Basic metabolic panel  (Ca, Cl, CO2, Creat, Gluc, K, Na, BUN)    Morbid obesity (H)    Acute gout of left hand, unspecified cause  -     predniSONE (DELTASONE) 20 MG tablet; Take 2 " tablets (40 mg) by mouth daily for 7 days    Type 2 diabetes, HbA1c goal < 7% (H)    Type 2 diabetes mellitus without complication, without long-term current use of insulin (H)    Will recheck labs to check kidney function.  Since his blood pressure is high we will start him on 10 mg of lisinopril and monitor the blood pressure.  Instruction was given to use torsemide only as needed if he gets 5 pound weight gain in 1 week or 3 pound weight gain in 1 day.  We will also reduce metformin to 20 mg twice a day.  Will adjust medications if needed based on the kidney function.  Will give steroid for gout.  Would avoid colchicine as he could not tolerate it in the past, also due to his acute kidney injury.    Patient was advised to follow-up in 1 month with primary care for Medicare wellness and reassess the kidney function.    This note was partially constructed through dragon dictation software in spite of proof reading some errors may persist.    Shu Fournier MD  [facility]

## 2020-02-05 NOTE — NURSING NOTE
"Patient here for follow up on bp.  BP (!) 140/72 (BP Location: Right arm, Patient Position: Sitting, Cuff Size: Adult Large)   Pulse 112   Temp 97.9  F (36.6  C) (Oral)   Resp 16   Ht 1.803 m (5' 11\")   Wt 116.9 kg (257 lb 11.2 oz)   SpO2 94%   BMI 35.94 kg/m      "

## 2020-02-05 NOTE — PATIENT INSTRUCTIONS
Take metformin 500mg bid  lisinopril 10mg once daily  Torsemide 20mg as needed if you have 5lb weight gain in 1 week or 3 lb weight gain in 1 days

## 2020-02-06 LAB
ANION GAP SERPL CALCULATED.3IONS-SCNC: 9 MMOL/L (ref 3–14)
BUN SERPL-MCNC: 19 MG/DL (ref 7–30)
CALCIUM SERPL-MCNC: 9.2 MG/DL (ref 8.5–10.1)
CHLORIDE SERPL-SCNC: 108 MMOL/L (ref 94–109)
CO2 SERPL-SCNC: 23 MMOL/L (ref 20–32)
CREAT SERPL-MCNC: 1.35 MG/DL (ref 0.66–1.25)
GFR SERPL CREATININE-BSD FRML MDRD: 51 ML/MIN/{1.73_M2}
GLUCOSE SERPL-MCNC: 335 MG/DL (ref 70–99)
POTASSIUM SERPL-SCNC: 4.1 MMOL/L (ref 3.4–5.3)
SODIUM SERPL-SCNC: 140 MMOL/L (ref 133–144)

## 2020-02-06 ASSESSMENT — ENCOUNTER SYMPTOMS
SHORTNESS OF BREATH: 0
DIARRHEA: 0
FATIGUE: 0
PALPITATIONS: 0
VOMITING: 0
DIZZINESS: 0
NAUSEA: 0
DYSURIA: 0

## 2020-02-17 DIAGNOSIS — I10 ESSENTIAL HYPERTENSION: ICD-10-CM

## 2020-02-17 DIAGNOSIS — E11.9 TYPE 2 DIABETES MELLITUS WITHOUT COMPLICATION, WITHOUT LONG-TERM CURRENT USE OF INSULIN (H): ICD-10-CM

## 2020-02-18 RX ORDER — LISINOPRIL 10 MG/1
10 TABLET ORAL DAILY
Qty: 90 TABLET | Refills: 1 | Status: SHIPPED | OUTPATIENT
Start: 2020-02-18 | End: 2020-08-06

## 2020-02-18 NOTE — TELEPHONE ENCOUNTER
"Requested Prescriptions   Pending Prescriptions Disp Refills     lisinopril (ZESTRIL) 10 MG tablet [Pharmacy Med Name: LISINOPRIL 10 MG TABLET]  Last Written Prescription Date:  10/31/2019  Last Fill Quantity: 270,  # refills: 0   Last office visit: 2/5/2020 with prescribing provider:     Future Office Visit:   Next 5 appointments (look out 90 days)    Feb 19, 2020  7:00 AM CST  PHYSICAL with Anh Spivey NP  [facility] [address]  [facility] [address]  [phone]        270 tablet 0     Sig: TAKE 3 TABLETS BY MOUTH EVERY DAY       ACE Inhibitors (Including Combos) Protocol Failed - 2/17/2020  1:28 AM        Failed - Blood pressure under 140/90 in past 12 months     BP Readings from Last 3 Encounters:   02/05/20 (!) 140/72   01/28/20 112/64   01/24/20 104/60                 Failed - Normal serum creatinine on file in past 12 months     Recent Labs   Lab Test 02/05/20  1443   CR 1.35*             Passed - Recent (12 mo) or future (30 days) visit within the authorizing provider's specialty     Patient has had an office visit with the authorizing provider or a provider within the authorizing providers department within the previous 12 mos or has a future within next 30 days. See \"Patient Info\" tab in inbasket, or \"Choose Columns\" in Meds & Orders section of the refill encounter.              Passed - Medication is active on med list        Passed - Patient is age 18 or older        Passed - Normal serum potassium on file in past 12 months     Recent Labs   Lab Test 02/05/20  1443   POTASSIUM 4.1             metFORMIN (GLUCOPHAGE) 1000 MG tablet [Pharmacy Med Name: METFORMIN  Last Written Prescription Date:  8/27/2019  Last Fill Quantity: 180,  # refills: 1   Last office visit: 2/5/2020 with prescribing provider:     Future Office Visit:   Next 5 appointments (look out 90 days)    Feb 19, 2020  7:00 AM CST  PHYSICAL with Anh Spivey, " "NP  [facility] [address]  [facility] [id number] [phone]        HCL 1,000 MG TABLET] 180 tablet 1     Sig: TAKE 1 TABLET BY MOUTH TWICE A DAY WITH MEALS       Biguanide Agents Failed - 2/17/2020  1:28 AM        Failed - Blood pressure less than 140/90 in past 6 months     BP Readings from Last 3 Encounters:   02/05/20 (!) 140/72   01/28/20 112/64   01/24/20 104/60                 Failed - Patient has had a Microalbumin in the past 15 mos.     Recent Labs   Lab Test 03/05/16  0945   MICROL 72   UMALCR 50.35*             Failed - Patient has documented A1c within the specified period of time.     If HgbA1C is 8 or greater, it needs to be on file within the past 3 months.  If less than 8, must be on file within the past 6 months.     Recent Labs   Lab Test 03/25/19  1647   A1C 6.3*             Passed - Patient has documented LDL within the past 12 mos.     Recent Labs   Lab Test 06/25/19  0754   LDL 43             Passed - Patient is age 10 or older        Passed - Patient's CR is NOT>1.4 OR Patient's EGFR is NOT<45 within past 12 mos.     Recent Labs   Lab Test 02/05/20  1443   GFRESTIMATED 51*   GFRESTBLACK 59*       Recent Labs   Lab Test 02/05/20  1443   CR 1.35*             Passed - Patient does NOT have a diagnosis of CHF.        Passed - Medication is active on med list        Passed - Recent (6 mo) or future (30 days) visit within the authorizing provider's specialty     Patient had office visit in the last 6 months or has a visit in the next 30 days with authorizing provider or within the authorizing provider's specialty.  See \"Patient Info\" tab in inbasket, or \"Choose Columns\" in Meds & Orders section of the refill encounter.            "

## 2020-02-19 ENCOUNTER — OFFICE VISIT (OUTPATIENT)
Dept: INTERNAL MEDICINE | Facility: CLINIC | Age: 75
End: 2020-02-19
Payer: COMMERCIAL

## 2020-02-19 VITALS
WEIGHT: 252 LBS | BODY MASS INDEX: 35.28 KG/M2 | HEART RATE: 90 BPM | TEMPERATURE: 98.8 F | OXYGEN SATURATION: 90 % | DIASTOLIC BLOOD PRESSURE: 62 MMHG | SYSTOLIC BLOOD PRESSURE: 118 MMHG | RESPIRATION RATE: 16 BRPM | HEIGHT: 71 IN

## 2020-02-19 DIAGNOSIS — E11.9 TYPE 2 DIABETES, HBA1C GOAL < 7% (H): Primary | ICD-10-CM

## 2020-02-19 DIAGNOSIS — Z00.00 HEALTHCARE MAINTENANCE: ICD-10-CM

## 2020-02-19 LAB — HBA1C MFR BLD: 7.7 % (ref 0–5.6)

## 2020-02-19 PROCEDURE — 99397 PER PM REEVAL EST PAT 65+ YR: CPT | Performed by: NURSE PRACTITIONER

## 2020-02-19 PROCEDURE — 83036 HEMOGLOBIN GLYCOSYLATED A1C: CPT | Performed by: NURSE PRACTITIONER

## 2020-02-19 PROCEDURE — 36415 COLL VENOUS BLD VENIPUNCTURE: CPT | Performed by: NURSE PRACTITIONER

## 2020-02-19 ASSESSMENT — ENCOUNTER SYMPTOMS
CONSTIPATION: 0
ARTHRALGIAS: 1
FREQUENCY: 1
CHILLS: 1
JOINT SWELLING: 0
ABDOMINAL PAIN: 0
HEARTBURN: 0
NAUSEA: 0
HEADACHES: 0
DIZZINESS: 0
MYALGIAS: 0
NERVOUS/ANXIOUS: 0
DYSURIA: 0
PALPITATIONS: 0
WEAKNESS: 0
HEMATURIA: 0
FEVER: 0
SHORTNESS OF BREATH: 1
EYE PAIN: 0
PARESTHESIAS: 0
SORE THROAT: 0
COUGH: 1
DIARRHEA: 0
HEMATOCHEZIA: 0

## 2020-02-19 ASSESSMENT — MIFFLIN-ST. JEOR: SCORE: 1900.19

## 2020-02-19 ASSESSMENT — ACTIVITIES OF DAILY LIVING (ADL): CURRENT_FUNCTION: NO ASSISTANCE NEEDED

## 2020-02-19 NOTE — LETTER
February 19, 2020      Lance Ibrahim  [address] [place] [address]        Dear ,    Your A1c came back elevated. Please continue taking your current medications along with dieting and exercising.    Resulted Orders   Hemoglobin A1c   Result Value Ref Range    Hemoglobin A1C 7.7 (H) 0 - 5.6 %      Comment:      Normal <5.7% Prediabetes 5.7-6.4%  Diabetes 6.5% or higher - adopted from ADA   consensus guidelines.         If you have any questions or concerns, please call the clinic at the number listed above.       Sincerely,        Anh Spivey NP

## 2020-02-19 NOTE — PROGRESS NOTES
"SUBJECTIVE:   Lance Ibrahim is a 74 year old male who presents for Preventive Visit.  Are you in the first 12 months of your Medicare coverage?  No    Healthy Habits:     In general, how would you rate your overall health?  Fair    Frequency of exercise:  None    Do you usually eat at least 4 servings of fruit and vegetables a day, include whole grains    & fiber and avoid regularly eating high fat or \"junk\" foods?  No    Taking medications regularly:  Yes    Medication side effects:  None    Ability to successfully perform activities of daily living:  No assistance needed    Home Safety:  No safety concerns identified    Hearing Impairment:  Need to ask people to speak up or repeat themselves    In the past 6 months, have you been bothered by leaking of urine?  No    In general, how would you rate your overall mental or emotional health?  Fair      PHQ-2 Total Score: 0    Additional concerns today:  No    Do you feel safe in your environment? Yes    Have you ever done Advance Care Planning? (For example, a Health Directive, POLST, or a discussion with a medical provider or your loved ones about your wishes): No, advance care planning information given to patient to review.  Advanced care planning was discussed at today's visit.      Fall risk  Fallen 2 or more times in the past year?: No  Any fall with injury in the past year?: No    Cognitive Screening   1) Repeat 3 items (Leader, Season, Table)    2) Clock draw: NORMAL  3) 3 item recall: Recalls 3 objects  Results: 3 items recalled: COGNITIVE IMPAIRMENT LESS LIKELY    Mini-CogTM Copyright S Rosa. Licensed by the author for use in [facility]; reprinted with permission (margarita@.[facility]). All rights reserved.      Do you have sleep apnea, excessive snoring or daytime drowsiness?: yes    Reviewed and updated as needed this visit by clinical staff         Reviewed and updated as needed this visit by Provider        Social History     Tobacco Use     Smoking " status: Former Smoker     Packs/day: 0.00     Last attempt to quit: 3/17/1993     Years since quittin.9     Smokeless tobacco: Never Used   Substance Use Topics     Alcohol use: Not Currently     If you drink alcohol do you typically have >3 drinks per day or >7 drinks per week? No    Alcohol Use 2020   Prescreen: >3 drinks/day or >7 drinks/week? No   No flowsheet data found.            Current providers sharing in care for this patient include:   Patient Care Team:  Anh Spivey NP as PCP - General (Nurse Practitioner - Adult Health)  Anh Spivey NP as Assigned PCP    The following health maintenance items are reviewed in Epic and correct as of today:  Health Maintenance   Topic Date Due     ZOSTER IMMUNIZATION (1 of 2) 1994     MEDICARE ANNUAL WELLNESS VISIT  2011     MICROALBUMIN  2017     A1C  2019     PHQ-2  2020     EYE EXAM  2020     LIPID  2020     FALL RISK ASSESSMENT  2020     BMP  2020     DIABETIC FOOT EXAM  01/15/2021     COLONOSCOPY  2023     DTAP/TDAP/TD IMMUNIZATION (3 - Td) 2023     ADVANCE CARE PLANNING  10/15/2023     INFLUENZA VACCINE  Completed     PNEUMOCOCCAL IMMUNIZATION 65+ LOW/MEDIUM RISK  Completed     AORTIC ANEURYSM SCREENING (SYSTEM ASSIGNED)  Completed     IPV IMMUNIZATION  Aged Out     MENINGITIS IMMUNIZATION  Aged Out     BP Readings from Last 3 Encounters:   20 118/62   20 (!) 140/72   20 112/64    Wt Readings from Last 3 Encounters:   20 114.3 kg (252 lb)   20 116.9 kg (257 lb 11.2 oz)   20 112.5 kg (248 lb)                  Patient Active Problem List   Diagnosis     Ventral hernia     Chronic rhinitis     Hypertrophy of prostate with urinary obstruction     Transient cerebral ischemia     Essential hypertension     CARDIOVASCULAR SCREENING; LDL GOAL LESS THAN 100     Gout     Type 2 diabetes, HbA1c goal < 7% (H)     Cervicalgia     Amputated toe, left  (H)     Type 2 diabetes mellitus with peripheral vascular disease (H)     Total knee replacement status     Dyspnea on exertion     Chest pain, unspecified type     Cardiomyopathy, unspecified type (H)     Shortness of breath     Renal colic     Obesity (BMI 35.0-39.9) with comorbidity (H)     CKD (chronic kidney disease) stage 3, GFR 30-59 ml/min (H)     Past Surgical History:   Procedure Laterality Date     AMPUTATE TOE(S) Left 10/15/2018    Procedure: AMPUTATE TOE(S);  Left third toe amputation;  Surgeon: Ayaka Azevedo DPM, Podiatry/Foot and Ankle Surgery;  Location: [facility] OR     ARTHROPLASTY KNEE Right 2018    Procedure: Right total knee arthroplasty;  Surgeon: Issa Cunha MD;  Location: [facility] OR     C NONSPECIFIC PROCEDURE      Diastasis recti repair     C NONSPECIFIC PROCEDURE      Ventral hernia repair     C NONSPECIFIC PROCEDURE      ORIF of left shoulder     COLONOSCOPY  3/9/2013    Procedure: COLONOSCOPY;  COLONOSCOPY;  Surgeon: Chau Hogan MD;  Location:  GI     CV HEART CATHETERIZATION WITH POSSIBLE INTERVENTION Left 3/5/2019    Procedure: Coronary Angiogram;  Surgeon: Nas Linda MD;  Location:  HEART CARDIAC CATH LAB     EYE SURGERY  2017    left eye cataract     FOOT SURGERY  2013    cyst removal      HERNIA REPAIR  2004    ventral        Social History     Tobacco Use     Smoking status: Former Smoker     Packs/day: 0.00     Last attempt to quit: 3/17/1993     Years since quittin.9     Smokeless tobacco: Never Used   Substance Use Topics     Alcohol use: Not Currently     Family History   Problem Relation Age of Onset     Family History Negative Mother          86 yo     Cancer Father          74 yo brain     Diabetes Maternal Grandfather          91 yo     Alcohol/Drug Paternal Grandfather                  Current Outpatient Medications   Medication Sig Dispense Refill     amLODIPine (NORVASC) 5 MG tablet Take 1  tablet (5 mg) by mouth daily 90 tablet 3     aspirin (ASA) 81 MG EC tablet Take 1 tablet (81 mg) by mouth daily 100 tablet 3     atorvastatin (LIPITOR) 40 MG tablet Take 1 tablet (40 mg) by mouth daily 90 tablet 3     blood glucose (ACCU-CHEK SMARTVIEW) test strip Use to test blood sugar 1 times daily or as directed. 100 strip 3     blood glucose monitoring (ACCU-CHEK FASTCLIX) lancets USE LANCETS 2 TIMES DAILY, AS DIRECTED. 200 each 1     Ferrous Gluconate 240 (27 Fe) MG TABS Take 1 tablet by mouth daily        finasteride (PROSCAR) 5 MG tablet Take 5 mg by mouth daily.       fluticasone (FLONASE) 50 MCG/ACT spray Spray 1 spray into both nostrils nightly as needed        isosorbide mononitrate (IMDUR) 30 MG 24 hr tablet Take 1 tablet (30 mg) by mouth daily 30 tablet 11     labetalol (NORMODYNE) 200 MG tablet Take 1 tablet (200 mg) by mouth 2 times daily 180 tablet 3     lisinopril 10 MG PO tablet Take 1 tablet (10 mg) by mouth daily 90 tablet 1     loratadine (CLARITIN) 10 MG tablet Take 10 mg by mouth as needed        metFORMIN 1000 MG PO tablet Take 0.5 tablets (500 mg) by mouth 2 times daily (with meals) 90 tablet 1     Multiple Vitamins-Minerals (MULTIVITAMIN ADULTS PO) Take 1 tablet by mouth daily        silver sulfADIAZINE (SILVADENE) 1 % external cream Apply topically daily 25 g 1     tamsulosin (FLOMAX) 0.4 MG 24 hr capsule Take 1 capsule by mouth daily.       Recent Labs   Lab Test 02/05/20  1443 01/24/20  1359  06/25/19  0754 03/25/19  1647  10/17/18  0709  05/01/18  1951 05/01/18  1332 10/07/17  1036 05/15/17  1714   A1C  --   --   --   --  6.3*  --  6.4*  --  6.7*  --  6.3* 6.3*   LDL  --   --   --  43  --   --   --   --   --   --  72 86   HDL  --   --   --  63  --   --   --   --   --   --  65 53   TRIG  --   --   --  65  --   --   --   --   --   --  68 99   ALT  --  37  --  24  --   --   --   --   --  30  --   --    CR 1.35* 1.78*   < > Canceled, Test credited 1.50*   < > 1.15   < > 1.06 0.93 0.88 0.88  "  GFRESTIMATED 51* 36*   < > Canceled, Test credited 45*   < > 62   < > 68 80 85 85   GFRESTBLACK 59* 42*   < > Canceled, Test credited 52*   < > 75   < > 83 >90 >90 >90  African American GFR Calc     POTASSIUM 4.1 5.0   < > Canceled, Test credited 4.7   < >  --    < >  --  4.4 4.3 3.9   TSH  --  3.24  --   --   --   --   --   --   --  2.19  --   --     < > = values in this interval not displayed.          Review of Systems   Constitutional: Positive for chills. Negative for fever.   HENT: Negative for congestion, ear pain, hearing loss and sore throat.    Eyes: Positive for visual disturbance. Negative for pain.   Respiratory: Positive for cough and shortness of breath.    Cardiovascular: Positive for peripheral edema. Negative for chest pain and palpitations.   Gastrointestinal: Negative for abdominal pain, constipation, diarrhea, heartburn, hematochezia and nausea.   Genitourinary: Positive for frequency. Negative for dysuria, genital sores, hematuria and urgency.   Musculoskeletal: Positive for arthralgias. Negative for joint swelling and myalgias.   Skin: Negative for rash.   Neurological: Negative for dizziness, weakness, headaches and paresthesias.   Psychiatric/Behavioral: Negative for mood changes. The patient is not nervous/anxious.          OBJECTIVE:   There were no vitals taken for this visit. Estimated body mass index is 35.94 kg/m  as calculated from the following:    Height as of 2/5/20: 1.803 m (5' 11\").    Weight as of 2/5/20: 116.9 kg (257 lb 11.2 oz).  Physical Exam  GENERAL: healthy, alert and no distress  NECK: no adenopathy, no asymmetry, masses, or scars and thyroid normal to palpation  RESP: lungs clear to auscultation - no rales, rhonchi or wheezes  CV: regular rate and rhythm, normal S1 S2, no S3 or S4, no murmur, click or rub, no peripheral edema and peripheral pulses strong  ABDOMEN: soft, nontender, no hepatosplenomegaly, no masses and bowel sounds normal        ASSESSMENT / PLAN:       " "ICD-10-CM    1. Type 2 diabetes, HbA1c goal < 7% (H) E11.9 Hemoglobin A1c   2. Healthcare maintenance Z00.00 GASTROENTEROLOGY ADULT REF PROCEDURE ONLY       COUNSELING:      Estimated body mass index is 35.94 kg/m  as calculated from the following:    Height as of 2/5/20: 1.803 m (5' 11\").    Weight as of 2/5/20: 116.9 kg (257 lb 11.2 oz).         reports that he quit smoking about 26 years ago. He smoked 0.00 packs per day. He has never used smokeless tobacco.      Appropriate preventive services were discussed with this patient, including applicable screening as appropriate for cardiovascular disease, diabetes, osteopenia/osteoporosis, and glaucoma.  As appropriate for age/gender, discussed screening for colorectal cancer, prostate cancer, breast cancer, and cervical cancer. Checklist reviewing preventive services available has been given to the patient.    Reviewed patients plan of care and provided an AVS. The Basic Care Plan (routine screening as documented in Health Maintenance) for Lance meets the Care Plan requirement. This Care Plan has been established and reviewed with the Patient.    Counseling Resources:  ATP IV Guidelines  Pooled Cohorts Equation Calculator  Breast Cancer Risk Calculator  FRAX Risk Assessment  ICSI Preventive Guidelines  Dietary Guidelines for Americans, 2010  [employer]ISSU[employer]'s MyPlate  ASA Prophylaxis  Lung CA Screening    Anh Spivey NP  [facility]    Identified Health Risks:  "

## 2020-02-19 NOTE — NURSING NOTE
"Vital signs:  Temp: 98.8  F (37.1  C) Temp src: Oral BP: 118/62 Pulse: 90   Resp: 16 SpO2: 90 %     Height: 180.3 cm (5' 11\") Weight: 114.3 kg (252 lb)  Estimated body mass index is 35.15 kg/m  as calculated from the following:    Height as of this encounter: 1.803 m (5' 11\").    Weight as of this encounter: 114.3 kg (252 lb).          "

## 2020-02-20 DIAGNOSIS — R07.9 CHEST PAIN, UNSPECIFIED TYPE: ICD-10-CM

## 2020-02-20 DIAGNOSIS — I10 ESSENTIAL HYPERTENSION: ICD-10-CM

## 2020-02-20 DIAGNOSIS — R06.09 DYSPNEA ON EXERTION: ICD-10-CM

## 2020-02-20 DIAGNOSIS — I42.9 CARDIOMYOPATHY, UNSPECIFIED TYPE (H): ICD-10-CM

## 2020-02-20 RX ORDER — ATORVASTATIN CALCIUM 40 MG/1
40 TABLET, FILM COATED ORAL DAILY
Qty: 90 TABLET | Refills: 3 | Status: SHIPPED | OUTPATIENT
Start: 2020-02-20 | End: 2021-03-15

## 2020-02-20 RX ORDER — ISOSORBIDE MONONITRATE 30 MG/1
30 TABLET, EXTENDED RELEASE ORAL DAILY
Qty: 90 TABLET | Refills: 3 | Status: SHIPPED | OUTPATIENT
Start: 2020-02-20 | End: 2021-02-03

## 2020-02-21 ENCOUNTER — APPOINTMENT (OUTPATIENT)
Dept: GENERAL RADIOLOGY | Facility: CLINIC | Age: 75
End: 2020-02-21
Attending: PHYSICIAN ASSISTANT
Payer: COMMERCIAL

## 2020-02-21 ENCOUNTER — HOSPITAL ENCOUNTER (EMERGENCY)
Facility: CLINIC | Age: 75
Discharge: HOME | End: 2020-02-21
Attending: PHYSICIAN ASSISTANT | Admitting: PHYSICIAN ASSISTANT
Payer: COMMERCIAL

## 2020-02-21 VITALS
SYSTOLIC BLOOD PRESSURE: 155 MMHG | RESPIRATION RATE: 20 BRPM | HEART RATE: 102 BPM | OXYGEN SATURATION: 97 % | TEMPERATURE: 97.7 F | DIASTOLIC BLOOD PRESSURE: 72 MMHG

## 2020-02-21 DIAGNOSIS — M54.2 NECK PAIN ON LEFT SIDE: ICD-10-CM

## 2020-02-21 LAB
ANION GAP SERPL CALCULATED.3IONS-SCNC: 6 MMOL/L (ref 3–14)
BASOPHILS # BLD AUTO: 0 10E9/L (ref 0–0.2)
BASOPHILS NFR BLD AUTO: 0.5 %
BUN SERPL-MCNC: 14 MG/DL (ref 7–30)
CALCIUM SERPL-MCNC: 9.3 MG/DL (ref 8.5–10.1)
CHLORIDE SERPL-SCNC: 106 MMOL/L (ref 94–109)
CO2 SERPL-SCNC: 25 MMOL/L (ref 20–32)
CREAT SERPL-MCNC: 1.14 MG/DL (ref 0.66–1.25)
DIFFERENTIAL METHOD BLD: ABNORMAL
EOSINOPHIL # BLD AUTO: 0.2 10E9/L (ref 0–0.7)
EOSINOPHIL NFR BLD AUTO: 3.2 %
ERYTHROCYTE [DISTWIDTH] IN BLOOD BY AUTOMATED COUNT: 17 % (ref 10–15)
GFR SERPL CREATININE-BSD FRML MDRD: 62 ML/MIN/{1.73_M2}
GLUCOSE SERPL-MCNC: 122 MG/DL (ref 70–99)
HCT VFR BLD AUTO: 35.8 % (ref 40–53)
HGB BLD-MCNC: 11.1 G/DL (ref 13.3–17.7)
IMM GRANULOCYTES # BLD: 0.1 10E9/L (ref 0–0.4)
IMM GRANULOCYTES NFR BLD: 1.5 %
LYMPHOCYTES # BLD AUTO: 1.6 10E9/L (ref 0.8–5.3)
LYMPHOCYTES NFR BLD AUTO: 21.6 %
MCH RBC QN AUTO: 29.8 PG (ref 26.5–33)
MCHC RBC AUTO-ENTMCNC: 31 G/DL (ref 31.5–36.5)
MCV RBC AUTO: 96 FL (ref 78–100)
MONOCYTES # BLD AUTO: 0.9 10E9/L (ref 0–1.3)
MONOCYTES NFR BLD AUTO: 12.5 %
NEUTROPHILS # BLD AUTO: 4.6 10E9/L (ref 1.6–8.3)
NEUTROPHILS NFR BLD AUTO: 60.7 %
NRBC # BLD AUTO: 0 10*3/UL
NRBC BLD AUTO-RTO: 0 /100
PLATELET # BLD AUTO: 284 10E9/L (ref 150–450)
POTASSIUM SERPL-SCNC: 4.1 MMOL/L (ref 3.4–5.3)
RBC # BLD AUTO: 3.73 10E12/L (ref 4.4–5.9)
SODIUM SERPL-SCNC: 137 MMOL/L (ref 133–144)
TROPONIN I SERPL-MCNC: <0.015 UG/L (ref 0–0.04)
WBC # BLD AUTO: 7.6 10E9/L (ref 4–11)

## 2020-02-21 PROCEDURE — 84484 ASSAY OF TROPONIN QUANT: CPT | Performed by: PHYSICIAN ASSISTANT

## 2020-02-21 PROCEDURE — 85025 COMPLETE CBC W/AUTO DIFF WBC: CPT | Performed by: PHYSICIAN ASSISTANT

## 2020-02-21 PROCEDURE — 71046 X-RAY EXAM CHEST 2 VIEWS: CPT

## 2020-02-21 PROCEDURE — 25000132 ZZH RX MED GY IP 250 OP 250 PS 637: Mod: GY | Performed by: PHYSICIAN ASSISTANT

## 2020-02-21 PROCEDURE — 99285 EMERGENCY DEPT VISIT HI MDM: CPT | Mod: 25

## 2020-02-21 PROCEDURE — 80048 BASIC METABOLIC PNL TOTAL CA: CPT | Performed by: PHYSICIAN ASSISTANT

## 2020-02-21 PROCEDURE — 73030 X-RAY EXAM OF SHOULDER: CPT | Mod: LT

## 2020-02-21 PROCEDURE — 93005 ELECTROCARDIOGRAM TRACING: CPT

## 2020-02-21 RX ORDER — HYDROCODONE BITARTRATE AND ACETAMINOPHEN 5; 325 MG/1; MG/1
1 TABLET ORAL EVERY 6 HOURS PRN
Qty: 8 TABLET | Refills: 0 | Status: SHIPPED | OUTPATIENT
Start: 2020-02-21 | End: 2020-03-18

## 2020-02-21 RX ORDER — HYDROCODONE BITARTRATE AND ACETAMINOPHEN 5; 325 MG/1; MG/1
1 TABLET ORAL ONCE
Status: COMPLETED | OUTPATIENT
Start: 2020-02-21 | End: 2020-02-21

## 2020-02-21 RX ADMIN — HYDROCODONE BITARTRATE AND ACETAMINOPHEN 1 TABLET: 5; 325 TABLET ORAL at 16:41

## 2020-02-21 ASSESSMENT — ENCOUNTER SYMPTOMS
SLEEP DISTURBANCE: 1
VOMITING: 0
SORE THROAT: 0
NECK PAIN: 1
COUGH: 0
SHORTNESS OF BREATH: 1
ARTHRALGIAS: 1

## 2020-02-21 NOTE — ED TRIAGE NOTES
Patient presents with left shoulder pain w/o injury since Wednesday. Patient having difficult time sleeping d/t pain. ABC's intact.

## 2020-02-21 NOTE — ED AVS SNAPSHOT
[facility] Emergency Department  [address]  [facility] [address]  Phone:  [phone]  Fax:  [phone]                                    Lance Ibrahim   MRN: [id number]    Department:  [facility] Emergency Department   Date of Visit:  2/21/2020           After Visit Summary Signature Page    I have received my discharge instructions, and my questions have been answered. I have discussed any challenges I see with this plan with the nurse or doctor.    ..........................................................................................................................................  Patient/Patient Representative Signature      ..........................................................................................................................................  Patient Representative Print Name and Relationship to Patient    ..................................................               ................................................  Date                                   Time    ..........................................................................................................................................  Reviewed by Signature/Title    ...................................................              ..............................................  Date                                               Time          22EPIC Rev 08/18

## 2020-02-21 NOTE — ED PROVIDER NOTES
"  History     Chief Complaint:  Shoulder Pain    The history is provided by the patient.      Lance Ibrahim is a 74 year old male with a history of CAD, CKD, cardiomyopathy, diabetes, hypertension, sleep apnea, and TIA who presents to the emergency department for evaluation of left shoulder pain, located in the \"soft tissue\" that radiates up into his neck that developed two nights ago. He denies any known trauma or injury to his shoulder, or experiencing pain like this in the past. Patient reports that his pain feels like \"somebody put a knife in it.\" He reports that at rest his pain is a 7-8/10 and when laying flat his pain is rated a 8-9/10 on the severity scale, causing him to not be able to sleep for the last 48 hours. Patient denies any worsening pain with exertion or ambulation or limited range of motion when moving his head and neck. He states he is short of breath at rest due to his exhaustion and known history of cardiomyopathy. He has been taking Aleve PM, in addition to heating it, and using Lidocaine patches to try to combat his pain, but is finding little relief with that.     Patient denies any pain in the front of his shoulder, chest pain, cough, cold symptoms, or vomiting. To note, the patient has had surgery on his left shoulder in the past, resulting in two screws being placed.     Cardiac Risk Factors:  SEX:              male  Tobacco:              Former  Hypertension:       Positive  Diabetes:             Positive  Lipids:                Positive  Personal history:  Positive  Family History:       Negative    PE and DVT Risk Factors:  Personal hx of PE/DVT:  Negative  Family hx of PE/DVT:   Negative  Recent travel:    Negative  Recent surgery:   Negative  Other immobilizations:  Negative  Hx cancer:    Negative    Allergies:  Penicillins  Sulfa Drugs  Ancef    Medications:    Flomax  Proscar  Aspirin  Norvasc  Labetalol   Lisinopril  Metformin  Atorvastatin    Past Medical History:  "   Diabetes  Hypertension  TIA  CKD  Renal colic  Cardiomyopathy  Gout   CAD  Hyperlipidemia   Sleep apnea   Cerebral artery occlusion with cerebral infarction    Past Surgical History:    Toe amputation  Knee arthroplasty  Ventral hernia repair  Left shoulder surgery  Colonoscopy  Heart catheterization  Eye surgery  Foot surgery    Family History:    Cancer     Social History:  Former tobacco user. Quit date: 1993  Former alcohol user.  Negative for drug use.  Marital Status:       Review of Systems   HENT: Negative for congestion and sore throat.    Respiratory: Positive for shortness of breath. Negative for cough.    Cardiovascular: Negative for chest pain.   Gastrointestinal: Negative for vomiting.   Musculoskeletal: Positive for arthralgias (left shoulder) and neck pain.   Psychiatric/Behavioral: Positive for sleep disturbance.   All other systems reviewed and are negative.    Physical Exam     Patient Vitals for the past 24 hrs:   BP Temp Temp src Pulse Resp SpO2   02/21/20 1609 (!) 158/77 97.7  F (36.5  C) Temporal 102 20 97 %       Physical Exam  General: Resting comfortably.  Alert and oriented.   Head:  The scalp, face, and head appear normal   Eyes:  Conjunctivae and sclerae are normal    CV:  Radial pulse intact to the left wrist.  Capillary refill is brisk in all digits of the left hand.   Resp:  No tachypnea.  No respiratory distress.  MS:   The patient can hold fingers and resisted abduction, make the okay sign and hold it against resistance, and has good strength with wrist extension to the upper extremity. Tenderness to the left trapezial and left cervical area. Patient has baseline ROM in left shoulder, elbow, and wrist.   Skin:  No rash or acute skin lesions noted.  Neuro: Sensation intact throughout left upper extremity.    Emergency Department Course     ECG:  Indication: Shoulder Pain  Time: 1638  Vent. Rate 87 bpm. NJ interval 174. QRS duration 108. QT/QTc 366/440. P-R-T axis 51 -23 73.  Normal sinus rhythm. Minimal voltage criteria for LVH, may be normal variant. Borderline ECG. Read time: 1644    Imaging:  Radiology findings were communicated with the patient who voiced understanding of the findings.    XR Shoulder, Left, G/E 3 views:   Screw fixation in the left femoral neck. The hardware is intact without evidence of complication. No acute fracture. Moderate-advanced left glenohumeral degenerative arthrosis. Suture anchor in the superior glenoid. Moderate-sized corticated ossicle in the subacromial space. Unremarkable acromioclavicular joint. As per radiology.    XR Chest, PA & LAT, G/E 3 views:   New moderate CHF with peribronchial cuffing and interstitial edema and mild vascular congestion. Mild cardia megaly. No pleural effusion or pneumothorax. Postsurgical change in the left shoulder. As per radiology.     Laboratory:  Laboratory findings were communicated with the patient who voiced understanding of the findings.    CBC: WBC: 7.6, HGB: 11.1 (L), PLT: 284  BMP: Glucose: 122 (H), o/w WNL (Creatinine: 1.14)    1635 Troponin: <0.015    Interventions:  1641 Norco 1 tablet PO    Emergency Department Course:  Past medical records, nursing notes, and vitals reviewed.    1615 I performed an exam of the patient as documented above.     EKG obtained in the ED, see results above.   IV was inserted and blood was drawn for laboratory testing, results above.    The patient was sent for a chest and left shoulder x-ray while in the emergency department, results above.     1725 I rechecked the patient and discussed the results of his workup thus far.     Findings and plan explained to the Patient. Patient discharged home with instructions regarding supportive care, medications, and reasons to return. The importance of close follow-up was reviewed. The patient was prescribed Hydrocodone.    I personally reviewed the laboratory and imaging results with the Patient and answered all related questions prior to  discharge.     Impression & Plan   Medical Decision Making:  Lance Ibrahim is a 74 year old male who presents to the emergency department for evaluation of left shoulder pain that radiates into his neck.  Please refer to HPI for further details.  He states this was unprovoked, and he is never had anything like this in the past.  He presented today given concern for severity of the pain and unrelenting nature.  He denies any associated symptoms.  He does not have any chest pain or shortness of breath. He has been trying multiple medications without relief.  He denies any neurologic complaints or vision changes.  The patient presses over the area of the left trapezius, and the left paraspinal musculature.  Patient has normal range of motion.  No signs of cervical dissection at this time.  Patient is a vasculopath given has had strokes, and does have known cardiac coronary artery disease, diabetes, and hypertension.  Given his concern, I did consider a cardiac mediated pathology.  Chest x-ray was performed and was negative.  Shoulder x-ray was unremarkable.  EKG is normal without evidence of ischemia or arrhythmia.  Basic labs are baseline.  Troponin is within normal limits.  Patient does feel improved after interventions here.  I do not feel any further work-up is need at this time.  He will be discharged home with close follow-up with a primary care doctor in the next 2 days.  Given a small prescription for Norco for breakthrough pain.  He is educated on the sedating side effects of this medication will not drive will take this medication.  Also discussed increased risk of falling with this medication, and he accepts risks.  He will continue use of Lidoderm patches and intermittent Aleve as well.  He will return immediately for any vision changes, neurologic symptoms, uncontrolled pain, weakness, numbness, tingling, or any other concerns.  All questions were answered prior to discharge.  Patient understands and agrees  with plan.    Diagnosis:    ICD-10-CM    1. Neck pain on left side M54.2        Disposition:  Discharged to home.    Discharge Medications:  New Prescriptions    HYDROCODONE-ACETAMINOPHEN (NORCO) 5-325 MG TABLET    Take 1 tablet by mouth every 6 hours as needed for pain       Scribe Disclosure:  I, Orquidea Sanders, am serving as a scribe at 4:21 PM on 2/21/2020 to document services personally performed by Marla Nassar PA,* based on my observations and the provider's statements to me.      Marla Nassar PA-C  02/21/20 2041

## 2020-02-22 LAB — INTERPRETATION ECG - MUSE: NORMAL

## 2020-02-24 ENCOUNTER — TELEPHONE (OUTPATIENT)
Dept: CARDIOLOGY | Facility: CLINIC | Age: 75
End: 2020-02-24

## 2020-02-24 DIAGNOSIS — I42.9 CARDIOMYOPATHY, UNSPECIFIED TYPE (H): Primary | ICD-10-CM

## 2020-02-24 DIAGNOSIS — R06.02 SHORTNESS OF BREATH: ICD-10-CM

## 2020-02-24 DIAGNOSIS — I10 ESSENTIAL HYPERTENSION: ICD-10-CM

## 2020-02-24 RX ORDER — TORSEMIDE 20 MG/1
20 TABLET ORAL DAILY
Qty: 90 TABLET | Refills: 3
Start: 2020-02-24 | End: 2020-03-18

## 2020-02-24 NOTE — TELEPHONE ENCOUNTER
Chest xray at time of ER visit shows new moderate CHF. Need to increase torsemide to 40 mg per day. Shou;lder discomfort spounds non cardiac with undetectable troponin despite hours of pain. Needs to be seen in clinic within the week. Thx

## 2020-02-24 NOTE — TELEPHONE ENCOUNTER
FELI call from scheduling: patient called to report that since Friday he has been experiencing dyspnea with exertion. He states he must climb his home steps 8-9 times a day to care for his wife. Since Friday he has been severely SOB by the time he reaches the top steps. This is outside his normal baseline.  Patient denies any weight gain or change in his known peripheral edema. He wears compression stockings routinely. Patient denies any chest pressure or palpitations. Patient states he has not has any respiratory issues since early January.  Patient checks BP and HR routinely. Recent BP readings are 117-126 / 70-80. His regular HR runs 88-92 BPM although he noted on Saturday a reading of 104 BPM.  Patient has a history of mild CAD, echo in Oct 2019 showed EF 50-55% with mild pHTN, and sleep apnea (not using CPAP).    Will message Dr. Martines to review

## 2020-02-24 NOTE — TELEPHONE ENCOUNTER
"Pt was in the ED 2/21/20 for left shoulder pain.     Per ED note dated 2/21/20, \"Lance Ibrahim is a 74 year old male who presents to the emergency department for evaluation of left shoulder pain that radiates into his neck.  Please refer to HPI for further details.  He states this was unprovoked, and he is never had anything like this in the past.  He presented today given concern for severity of the pain and unrelenting nature.  He denies any associated symptoms.  He does not have any chest pain or shortness of breath. He has been trying multiple medications without relief.  He denies any neurologic complaints or vision changes.  The patient presses over the area of the left trapezius, and the left paraspinal musculature.  Patient has normal range of motion.  No signs of cervical dissection at this time.  Patient is a vasculopath given has had strokes, and does have known cardiac coronary artery disease, diabetes, and hypertension.  Given his concern, I did consider a cardiac mediated pathology.  Chest x-ray was performed and was negative.  Shoulder x-ray was unremarkable.  EKG is normal without evidence of ischemia or arrhythmia.  Basic labs are baseline.  Troponin is within normal limits.  Patient does feel improved after interventions here.  I do not feel any further work-up is need at this time.  He will be discharged home with close follow-up with a primary care doctor in the next 2 days.     Await review by Dr. Martines. Rayna PAULA   "

## 2020-02-25 NOTE — TELEPHONE ENCOUNTER
Called pt with recommendations from Dr. Martines to increase torsemide to 40 mg daily & follow up this week.   Pt scheduled w/ Stanley MARINO w/ [facility] prior. Pt advised to call if symptoms continue or worsen.   Pt states his torsemide was stopped in January, so advised to restart at 20 mg for now & will clarify dosage w/ Dr. Martines tomorrow. Rayna PAULA

## 2020-02-25 NOTE — TELEPHONE ENCOUNTER
"Received recommendations from Dr. Martines, \"start at initial 40 mg per day until seen soon in clinic.\"     Called pt with recommendation from Dr. Martines. Pt states the last time he took 40 mg of the torsemide he was up going to the bathroom every 20 minutes for a week including the middle of the night. Pt states he knows his body & does not want to take the 40 mg dose. Pt states he took 20 mg last night & 20 mg again this morning & will continue at 20 mg daily and see how he does. Rayna PAULA   "

## 2020-02-28 ENCOUNTER — OFFICE VISIT (OUTPATIENT)
Dept: CARDIOLOGY | Facility: CLINIC | Age: 75
End: 2020-02-28
Payer: COMMERCIAL

## 2020-02-28 VITALS
DIASTOLIC BLOOD PRESSURE: 61 MMHG | WEIGHT: 257 LBS | BODY MASS INDEX: 35.98 KG/M2 | HEIGHT: 71 IN | OXYGEN SATURATION: 91 % | SYSTOLIC BLOOD PRESSURE: 103 MMHG | HEART RATE: 90 BPM

## 2020-02-28 DIAGNOSIS — R06.02 SHORTNESS OF BREATH: ICD-10-CM

## 2020-02-28 DIAGNOSIS — I42.9 CARDIOMYOPATHY, UNSPECIFIED TYPE (H): Primary | ICD-10-CM

## 2020-02-28 DIAGNOSIS — I42.9 CARDIOMYOPATHY, UNSPECIFIED TYPE (H): ICD-10-CM

## 2020-02-28 LAB
ANION GAP SERPL CALCULATED.3IONS-SCNC: 6 MMOL/L (ref 3–14)
BUN SERPL-MCNC: 26 MG/DL (ref 7–30)
CALCIUM SERPL-MCNC: 9.3 MG/DL (ref 8.5–10.1)
CHLORIDE SERPL-SCNC: 107 MMOL/L (ref 94–109)
CO2 SERPL-SCNC: 27 MMOL/L (ref 20–32)
CREAT SERPL-MCNC: 1.45 MG/DL (ref 0.66–1.25)
GFR SERPL CREATININE-BSD FRML MDRD: 46 ML/MIN/{1.73_M2}
GLUCOSE SERPL-MCNC: 127 MG/DL (ref 70–99)
NT-PROBNP SERPL-MCNC: 178 PG/ML (ref 0–450)
POTASSIUM SERPL-SCNC: 4.3 MMOL/L (ref 3.4–5.3)
SODIUM SERPL-SCNC: 140 MMOL/L (ref 133–144)

## 2020-02-28 PROCEDURE — 83880 ASSAY OF NATRIURETIC PEPTIDE: CPT | Performed by: INTERNAL MEDICINE

## 2020-02-28 PROCEDURE — 36415 COLL VENOUS BLD VENIPUNCTURE: CPT | Performed by: INTERNAL MEDICINE

## 2020-02-28 PROCEDURE — 99214 OFFICE O/P EST MOD 30 MIN: CPT | Performed by: PHYSICIAN ASSISTANT

## 2020-02-28 PROCEDURE — 80048 BASIC METABOLIC PNL TOTAL CA: CPT | Performed by: INTERNAL MEDICINE

## 2020-02-28 ASSESSMENT — MIFFLIN-ST. JEOR: SCORE: 1922.87

## 2020-02-28 NOTE — PATIENT INSTRUCTIONS
The most likely cause of the increase in shortness of breath with exertion is fluid since you were off the torsemide.     We will get another lab today and recheck the echocardiogram.     See me back after the echo.     If the echo shows any change or if your shortness of breath does not get better back on the diuretic, we will consider a stress test.

## 2020-02-28 NOTE — LETTER
2020    Anh Spivey NP  [address] [facility] [address]    RE: Lance Ibrahim       Dear Colleague,    I had the pleasure of seeing Lance Ibrahim in the [facility].    CARDIOLOGY CLINIC PROGRESS NOTE    DOS: 2020      Lance Ibrahim  : 1945, 74 year old  MRN: [id number]      History:  I am meeting Lance Ibrahim today for follow up in the cardiology clinic.  He is a patient of Dr. Martines, and typically follows with Maria Fernanda RADFORD PA-C.     Lance Ibrahim is a 74 year old man with history of mild cardiomyopathy, CAD, HTN, hyperlipidemia, sleep apnea (not on CPAP), obesity, DM2, TIA.     His cardiomyopathy was first noted in 2018 when he was hospitalized with sepsis.  He subsequently was seen in the Emergency Department in early 2019 with substernal chest discomfort and shortness of breath.  Ultimately he was referred to Cardiology and ended up undergoing coronary angiography which revealed a subtotal occlusion in a small apical LAD with mild to moderate disease in the remainder of the coronary tree, including moderate left main disease which was borderline significant by iFR.  Medical management was recommended.  During his cardiac catheterization, he was noted to have severe hypertension for which he received numerous IV medications as well as a very elevated LV filling pressure with an LVEDP of 32 mmHg.  Multiple changes were made to his antihypertensive therapy.  Additionally, he was started on diuretic therapy, initially at torsemide 40 mg daily, which eventually was switched to 20 mg daily.   Due to his underlying CAD, atorvastatin was increased.    Echo 10/11/19 showed low-normal LV systolic function with an EF of 50%-55%.  There was borderline right ventricular enlargement with mildly elevated RVSP consistent with mild pulmonary hypertension.  The reader mentions that compared to his last echocardiogram, his EF had improved slightly and that the  RVSP now was mildly increased.   The mildly elevated RVSP and RV dilatation likely due to sleep apnea and obesity.    Interval History:   2/21/20 ED visit for shoulder pain.  CXR showed new moderate CHF.     2/24/20  He called noting worsened ISBELL.  He is significantly SOB after climbing stairs at home.  Dr. Martines reviewed the recent CXR and recommended he increase torsemide to 40 mg daily.   The patient had actually stopped torsemide in January.  So it was restarted back at 20 mg.     He presents today for follow up.   He says he has had about 3 weeks of increased ISBELL.   He gets SOB when he climbs his flight of stairs in his home.  This is new in the past 3 weeks.   BP is controlled.   He has chronic LE edema.  This is not worse.   No orthopnea, no PND.       ROS:  Skin:  Positive for itching   Eyes:  Positive for visual blurring  ENT:  Positive for    Respiratory:  Positive for dyspnea on exertion;sleep apnea  Cardiovascular:    Positive for;edema;fatigue  Gastroenterology: Negative for    Genitourinary:  not assessed    Musculoskeletal:  Positive for back pain  Neurologic:  not assessed    Psychiatric:  not assessed    Heme/Lymph/Imm:  not assessed    Endocrine:  Positive for      PAST MEDICAL HISTORY:  Past Medical History:   Diagnosis Date     Abnormal stress test 3/4/2019    Added automatically from request for surgery [id number]     Arthritis     osteoarthritis knees     CAD (coronary artery disease)     subtotal occlusion in the small distal LAD      Cardiomyopathy (H)      Cerebral artery occlusion with cerebral infarction (H)     TIA 1993, no residual     Chest pain      Chronic rhinitis      ISBELL (dyspnea on exertion)      Dyspnea 02/17/2019     Edema      HTN (hypertension)      Hyperlipidemia      Hypertrophy of prostate with urinary obstruction and other lower urinary tract symptoms (LUTS)      Nephrolithiasis      Orthopnea      PVD (peripheral vascular disease) (H)      Renal colic      S/P cardiac  cath 2019    subtotal occlusion in the small distal LAD      Sleep apnea     doesn't use cpap     TIA (transient ischaemic attack)      Type 2 diabetes mellitus with peripheral vascular disease (H) 2018     Unspecified transient cerebral ischemia     No definite source found     Ureteral stone        PAST SURGICAL HISTORY:  Past Surgical History:   Procedure Laterality Date     AMPUTATE TOE(S) Left 10/15/2018    Procedure: AMPUTATE TOE(S);  Left third toe amputation;  Surgeon: Ayaka Azevedo DPM, Podiatry/Foot and Ankle Surgery;  Location: [facility] OR     ARTHROPLASTY KNEE Right 2018    Procedure: Right total knee arthroplasty;  Surgeon: Issa Cunha MD;  Location: [facility] OR     C NONSPECIFIC PROCEDURE      Diastasis recti repair     C NONSPECIFIC PROCEDURE      Ventral hernia repair     C NONSPECIFIC PROCEDURE      ORIF of left shoulder     COLONOSCOPY  3/9/2013    Procedure: COLONOSCOPY;  COLONOSCOPY;  Surgeon: Chau Hogan MD;  Location:  GI     CV HEART CATHETERIZATION WITH POSSIBLE INTERVENTION Left 3/5/2019    Procedure: Coronary Angiogram;  Surgeon: Nas Linda MD;  Location:  HEART CARDIAC CATH LAB     EYE SURGERY  2017    left eye cataract     FOOT SURGERY  2013    cyst removal      HERNIA REPAIR  2004    ventral        SOCIAL HISTORY:  Social History     Socioeconomic History     Marital status:      Spouse name: Not on file     Number of children: Not on file     Years of education: Not on file     Highest education level: Not on file   Occupational History     Employer: SELF   Social Needs     Financial resource strain: Not on file     Food insecurity:     Worry: Not on file     Inability: Not on file     Transportation needs:     Medical: Not on file     Non-medical: Not on file   Tobacco Use     Smoking status: Former Smoker     Packs/day: 0.00     Last attempt to quit: 3/17/1993     Years since quittin.9     Smokeless tobacco: Never  Used   Substance and Sexual Activity     Alcohol use: Not Currently     Drug use: No     Sexual activity: Never   Lifestyle     Physical activity:     Days per week: Not on file     Minutes per session: Not on file     Stress: Not on file   Relationships     Social connections:     Talks on phone: Not on file     Gets together: Not on file     Attends Protestant service: Not on file     Active member of club or organization: Not on file     Attends meetings of clubs or organizations: Not on file     Relationship status: Not on file     Intimate partner violence:     Fear of current or ex partner: Not on file     Emotionally abused: Not on file     Physically abused: Not on file     Forced sexual activity: Not on file   Other Topics Concern     Parent/sibling w/ CABG, MI or angioplasty before 65F 55M? Not Asked   Social History Narrative     Not on file       FAMILY HISTORY:  Family History   Problem Relation Age of Onset     Family History Negative Mother          88 yo     Cancer Father          76 yo brain     Diabetes Maternal Grandfather          91 yo     Alcohol/Drug Paternal Grandfather                MEDS: amLODIPine (NORVASC) 5 MG tablet, Take 1 tablet (5 mg) by mouth daily  aspirin (ASA) 81 MG EC tablet, Take 1 tablet (81 mg) by mouth daily  atorvastatin 40 MG PO tablet, Take 1 tablet (40 mg) by mouth daily  blood glucose monitoring (ACCU-CHEK FASTCLIX) lancets, USE LANCETS 2 TIMES DAILY, AS DIRECTED.  Ferrous Gluconate 240 (27 Fe) MG TABS, Take 1 tablet by mouth daily   finasteride (PROSCAR) 5 MG tablet, Take 5 mg by mouth daily.  fluticasone (FLONASE) 50 MCG/ACT spray, Spray 1 spray into both nostrils nightly as needed   isosorbide mononitrate 30 MG PO 24 hr tablet, Take 1 tablet (30 mg) by mouth daily  labetalol (NORMODYNE) 200 MG tablet, Take 1 tablet (200 mg) by mouth 2 times daily  lisinopril 10 MG PO tablet, Take 1 tablet (10 mg) by mouth daily  loratadine (CLARITIN)  "10 MG tablet, Take 10 mg by mouth as needed   metFORMIN 1000 MG PO tablet, Take 0.5 tablets (500 mg) by mouth 2 times daily (with meals) (Patient taking differently: Take 1,000 mg by mouth 2 times daily (with meals) )  Multiple Vitamins-Minerals (MULTIVITAMIN ADULTS PO), Take 1 tablet by mouth daily   silver sulfADIAZINE (SILVADENE) 1 % external cream, Apply topically daily  tamsulosin (FLOMAX) 0.4 MG 24 hr capsule, Take 1 capsule by mouth daily.  torsemide (DEMADEX) 20 MG tablet, Take 1 tablet (20 mg) by mouth daily  [] blood glucose (ACCU-CHEK SMARTVIEW) test strip, Use to test blood sugar 1 times daily or as directed.  [] HYDROcodone-acetaminophen (NORCO) 5-325 MG tablet, Take 1 tablet by mouth every 6 hours as needed for pain    No current facility-administered medications on file prior to visit.       ALLERGIES:   Allergies   Allergen Reactions     Penicillins Anaphylaxis     \"anaphylactic\"     Sulfa Drugs      \"itchy rash, swelling of face and hands\"     Ancef [Cefazolin] Rash       PHYSICAL EXAM:  Vitals: /61   Pulse 90   Ht 1.803 m (5' 11\")   Wt 116.6 kg (257 lb)   SpO2 91%   BMI 35.84 kg/m     Constitutional:  cooperative, alert and oriented, well developed, well nourished, in no acute distress obese      Skin:  warm and dry to the touch   scattered excoriations on his LEs from scratching due to dry skin    Head:  normocephalic        Eyes:  pupils equal and round;conjunctivae and lids unremarkable;sclera white        ENT:  no pallor or cyanosis        Neck:      no JVD appreciated but thick neck    Respiratory:  clear to auscultation        Cardiac: regular rhythm;normal S1 and S2   S4         no significant murmur appreciated     GI:  abdomen soft;BS normoactive obese      Vascular: pulses full and equal                                      Extremities and Musculoskeletal:  no deformities, clubbing, cyanosis, erythema observed        Neurological:  no gross motor deficits;affect " appropriate            LABS/DATA:  I reviewed the following:  Component      Latest Ref Rng & Units 2/28/2020   Sodium      133 - 144 mmol/L 140   Potassium      3.4 - 5.3 mmol/L 4.3   Chloride      94 - 109 mmol/L 107   Carbon Dioxide      20 - 32 mmol/L 27   Anion Gap      3 - 14 mmol/L 6   Glucose      70 - 99 mg/dL 127 (H)   Urea Nitrogen      7 - 30 mg/dL 26   Creatinine      0.66 - 1.25 mg/dL 1.45 (H)   GFR Estimate      >60 mL/min/1.73:m2 46 (L)   GFR Estimate If Black      >60 mL/min/1.73:m2 54 (L)   Calcium      8.5 - 10.1 mg/dL 9.3         Echo 10/11/19:  Interpretation Summary  Left ventricular systolic function is low normal.  The visual ejection fraction is estimated at 50-55%.  Borderline right ventricular enlargement.  The right ventricular systolic function is normal.  Right ventricular systolic pressure is elevated, consistent with mild  pulmonary hypertension.  Compared to 10/18, RVSP is mildly increased. EF now low normal compared to  mildly reduced on prior study. The study was technically difficult.          ASSESSMENT/PLAN:  H/o mild cardiomyopathy  - Echo 10/2019: LVEF 50%-55%.  There was borderline right ventricular enlargement with mildly elevated RVSP consistent with mild pulmonary hypertension.  The reader mentions that compared to his last echocardiogram, his EF had improved slightly and that the RVSP now was mildly increased.    The mildly elevated RVSP and RV dilatation likely due to untreated sleep apnea and obesity.  - Was on torsemide 20 mg, but self-stopped this in Jan  - CXR 2/21/20 done for shoulder pain, showed CHF  - Called 2/24/20 with increased ISBELL.  Torsemide resumed at 20 mg  - Today he is not in clear CHF, but he does have S4 on exam, edema though chronic.  NT pro BNP today.  Continue torsemide 20 mg for now.  Will repeat echo.       Increased ISBELL  - Most likely acute on chronic CHF  - Had self-stopped his torsemide in Jan  - CXR 2/21/20 showed CHF  - Now back on torsemide  20 mg  - Today he is not in clear CHF, but he does have S4 on exam, edema though chronic.  Add on NT pro BNP today  Continue torsemide 20 mg for now.  Will repeat echo.   - Should his ISBELL not improve being back on torsemide, or if his LVEF has dropped, would then need to consider ischemic evaluation      CAD  - Continue ASA, statin  - No clear anginal pain  - Ischemic eval as noted above      HTN  - BP controlled on amlodipine 5 mg, labetalol 200 mg BID, Imdur 30 mg, torsemide 20 mg      Hyperlipidemia  - 6/2019 FLP: , HDL 63, LDL 43, TG 65  - Continue atorvastatin 40 mg      Sleep apnea  - CPAP recommended  - Weight loss      Oesity  - Weight loss      DM2      TIA           Stanley Ortega PA-C    Thank you for allowing me to participate in the care of your patient.    Sincerely,     Stanley Ortega PA-C     [facility]

## 2020-02-28 NOTE — PROGRESS NOTES
CARDIOLOGY CLINIC PROGRESS NOTE    DOS: 2020      Lance Ibrahim  : 1945, 74 year old  MRN: [id number]      History:  I am meeting Lance Ibrahim today for follow up in the cardiology clinic.  He is a patient of Dr. Matrines, and typically follows with Maria Fernanda RADFORD PA-C.     Lance Ibrahim is a 74 year old man with history of mild cardiomyopathy, CAD, HTN, hyperlipidemia, sleep apnea (not on CPAP), obesity, DM2, TIA.     His cardiomyopathy was first noted in 2018 when he was hospitalized with sepsis.  He subsequently was seen in the Emergency Department in early 2019 with substernal chest discomfort and shortness of breath.  Ultimately he was referred to Cardiology and ended up undergoing coronary angiography which revealed a subtotal occlusion in a small apical LAD with mild to moderate disease in the remainder of the coronary tree, including moderate left main disease which was borderline significant by iFR.  Medical management was recommended.  During his cardiac catheterization, he was noted to have severe hypertension for which he received numerous IV medications as well as a very elevated LV filling pressure with an LVEDP of 32 mmHg.  Multiple changes were made to his antihypertensive therapy.  Additionally, he was started on diuretic therapy, initially at torsemide 40 mg daily, which eventually was switched to 20 mg daily.  Due to his underlying CAD, atorvastatin was increased.    Echo 10/11/19 showed low-normal LV systolic function with an EF of 50%-55%.  There was borderline right ventricular enlargement with mildly elevated RVSP consistent with mild pulmonary hypertension.  The reader mentions that compared to his last echocardiogram, his EF had improved slightly and that the RVSP now was mildly increased.  The mildly elevated RVSP and RV dilatation likely due to sleep apnea and obesity.    Interval History:   20 ED visit for shoulder pain.  CXR showed new moderate CHF.     20  He  called noting worsened ISBELL.  He is significantly SOB after climbing stairs at home.  Dr. Martines reviewed the recent CXR and recommended he increase torsemide to 40 mg daily.   The patient had actually stopped torsemide in January.  So it was restarted back at 20 mg.     He presents today for follow up.   He says he has had about 3 weeks of increased ISBELL.   He gets SOB when he climbs his flight of stairs in his home.  This is new in the past 3 weeks.   BP is controlled.   He has chronic LE edema.  This is not worse.   No orthopnea, no PND.       ROS:  Skin:  Positive for itching   Eyes:  Positive for visual blurring  ENT:  Positive for    Respiratory:  Positive for dyspnea on exertion;sleep apnea  Cardiovascular:    Positive for;edema;fatigue  Gastroenterology: Negative for    Genitourinary:  not assessed    Musculoskeletal:  Positive for back pain  Neurologic:  not assessed    Psychiatric:  not assessed    Heme/Lymph/Imm:  not assessed    Endocrine:  Positive for      PAST MEDICAL HISTORY:  Past Medical History:   Diagnosis Date     Abnormal stress test 3/4/2019    Added automatically from request for surgery [id number]     Arthritis     osteoarthritis knees     CAD (coronary artery disease)     subtotal occlusion in the small distal LAD      Cardiomyopathy (H)      Cerebral artery occlusion with cerebral infarction (H)     TIA 1993, no residual     Chest pain      Chronic rhinitis      ISBELL (dyspnea on exertion)      Dyspnea 02/17/2019     Edema      HTN (hypertension)      Hyperlipidemia      Hypertrophy of prostate with urinary obstruction and other lower urinary tract symptoms (LUTS)      Nephrolithiasis      Orthopnea      PVD (peripheral vascular disease) (H)      Renal colic      S/P cardiac cath 03/05/2019    subtotal occlusion in the small distal LAD      Sleep apnea     doesn't use cpap     TIA (transient ischaemic attack) 1993     Type 2 diabetes mellitus with peripheral vascular disease (H) 11/8/2018      Unspecified transient cerebral ischemia     No definite source found     Ureteral stone        PAST SURGICAL HISTORY:  Past Surgical History:   Procedure Laterality Date     AMPUTATE TOE(S) Left 10/15/2018    Procedure: AMPUTATE TOE(S);  Left third toe amputation;  Surgeon: Ayaka Azevedo DPM, Podiatry/Foot and Ankle Surgery;  Location: [facility] OR     ARTHROPLASTY KNEE Right 2018    Procedure: Right total knee arthroplasty;  Surgeon: Issa Cunha MD;  Location: [facility] OR     C NONSPECIFIC PROCEDURE      Diastasis recti repair     C NONSPECIFIC PROCEDURE      Ventral hernia repair     C NONSPECIFIC PROCEDURE      ORIF of left shoulder     COLONOSCOPY  3/9/2013    Procedure: COLONOSCOPY;  COLONOSCOPY;  Surgeon: Chau Hogan MD;  Location:  GI     CV HEART CATHETERIZATION WITH POSSIBLE INTERVENTION Left 3/5/2019    Procedure: Coronary Angiogram;  Surgeon: Nas Linda MD;  Location:  HEART CARDIAC CATH LAB     EYE SURGERY  2017    left eye cataract     FOOT SURGERY  2013    cyst removal      HERNIA REPAIR  2004    ventral        SOCIAL HISTORY:  Social History     Socioeconomic History     Marital status:      Spouse name: Not on file     Number of children: Not on file     Years of education: Not on file     Highest education level: Not on file   Occupational History     Employer: SELF   Social Needs     Financial resource strain: Not on file     Food insecurity:     Worry: Not on file     Inability: Not on file     Transportation needs:     Medical: Not on file     Non-medical: Not on file   Tobacco Use     Smoking status: Former Smoker     Packs/day: 0.00     Last attempt to quit: 3/17/1993     Years since quittin.9     Smokeless tobacco: Never Used   Substance and Sexual Activity     Alcohol use: Not Currently     Drug use: No     Sexual activity: Never   Lifestyle     Physical activity:     Days per week: Not on file     Minutes per session: Not on file      Stress: Not on file   Relationships     Social connections:     Talks on phone: Not on file     Gets together: Not on file     Attends Denominational service: Not on file     Active member of club or organization: Not on file     Attends meetings of clubs or organizations: Not on file     Relationship status: Not on file     Intimate partner violence:     Fear of current or ex partner: Not on file     Emotionally abused: Not on file     Physically abused: Not on file     Forced sexual activity: Not on file   Other Topics Concern     Parent/sibling w/ CABG, MI or angioplasty before 65F 55M? Not Asked   Social History Narrative     Not on file       FAMILY HISTORY:  Family History   Problem Relation Age of Onset     Family History Negative Mother          88 yo     Cancer Father          74 yo brain     Diabetes Maternal Grandfather          91 yo     Alcohol/Drug Paternal Grandfather                MEDS: amLODIPine (NORVASC) 5 MG tablet, Take 1 tablet (5 mg) by mouth daily  aspirin (ASA) 81 MG EC tablet, Take 1 tablet (81 mg) by mouth daily  atorvastatin 40 MG PO tablet, Take 1 tablet (40 mg) by mouth daily  blood glucose monitoring (ACCU-CHEK FASTCLIX) lancets, USE LANCETS 2 TIMES DAILY, AS DIRECTED.  Ferrous Gluconate 240 (27 Fe) MG TABS, Take 1 tablet by mouth daily   finasteride (PROSCAR) 5 MG tablet, Take 5 mg by mouth daily.  fluticasone (FLONASE) 50 MCG/ACT spray, Spray 1 spray into both nostrils nightly as needed   isosorbide mononitrate 30 MG PO 24 hr tablet, Take 1 tablet (30 mg) by mouth daily  labetalol (NORMODYNE) 200 MG tablet, Take 1 tablet (200 mg) by mouth 2 times daily  lisinopril 10 MG PO tablet, Take 1 tablet (10 mg) by mouth daily  loratadine (CLARITIN) 10 MG tablet, Take 10 mg by mouth as needed   metFORMIN 1000 MG PO tablet, Take 0.5 tablets (500 mg) by mouth 2 times daily (with meals) (Patient taking differently: Take 1,000 mg by mouth 2 times daily (with meals)  ")  Multiple Vitamins-Minerals (MULTIVITAMIN ADULTS PO), Take 1 tablet by mouth daily   silver sulfADIAZINE (SILVADENE) 1 % external cream, Apply topically daily  tamsulosin (FLOMAX) 0.4 MG 24 hr capsule, Take 1 capsule by mouth daily.  torsemide (DEMADEX) 20 MG tablet, Take 1 tablet (20 mg) by mouth daily  [] blood glucose (ACCU-CHEK SMARTVIEW) test strip, Use to test blood sugar 1 times daily or as directed.  [] HYDROcodone-acetaminophen (NORCO) 5-325 MG tablet, Take 1 tablet by mouth every 6 hours as needed for pain    No current facility-administered medications on file prior to visit.       ALLERGIES:   Allergies   Allergen Reactions     Penicillins Anaphylaxis     \"anaphylactic\"     Sulfa Drugs      \"itchy rash, swelling of face and hands\"     Ancef [Cefazolin] Rash       PHYSICAL EXAM:  Vitals: /61   Pulse 90   Ht 1.803 m (5' 11\")   Wt 116.6 kg (257 lb)   SpO2 91%   BMI 35.84 kg/m    Constitutional:  cooperative, alert and oriented, well developed, well nourished, in no acute distress obese      Skin:  warm and dry to the touch   scattered excoriations on his LEs from scratching due to dry skin    Head:  normocephalic        Eyes:  pupils equal and round;conjunctivae and lids unremarkable;sclera white        ENT:  no pallor or cyanosis        Neck:      no JVD appreciated but thick neck    Respiratory:  clear to auscultation        Cardiac: regular rhythm;normal S1 and S2   S4         no significant murmur appreciated     GI:  abdomen soft;BS normoactive obese      Vascular: pulses full and equal                                      Extremities and Musculoskeletal:  no deformities, clubbing, cyanosis, erythema observed        Neurological:  no gross motor deficits;affect appropriate            LABS/DATA:  I reviewed the following:  Component      Latest Ref Rng & Units 2020   Sodium      133 - 144 mmol/L 140   Potassium      3.4 - 5.3 mmol/L 4.3   Chloride      94 - 109 mmol/L 107 "   Carbon Dioxide      20 - 32 mmol/L 27   Anion Gap      3 - 14 mmol/L 6   Glucose      70 - 99 mg/dL 127 (H)   Urea Nitrogen      7 - 30 mg/dL 26   Creatinine      0.66 - 1.25 mg/dL 1.45 (H)   GFR Estimate      >60 mL/min/1.73:m2 46 (L)   GFR Estimate If Black      >60 mL/min/1.73:m2 54 (L)   Calcium      8.5 - 10.1 mg/dL 9.3         Echo 10/11/19:  Interpretation Summary  Left ventricular systolic function is low normal.  The visual ejection fraction is estimated at 50-55%.  Borderline right ventricular enlargement.  The right ventricular systolic function is normal.  Right ventricular systolic pressure is elevated, consistent with mild  pulmonary hypertension.  Compared to 10/18, RVSP is mildly increased. EF now low normal compared to  mildly reduced on prior study. The study was technically difficult.          ASSESSMENT/PLAN:  H/o mild cardiomyopathy  - Echo 10/2019: LVEF 50%-55%.  There was borderline right ventricular enlargement with mildly elevated RVSP consistent with mild pulmonary hypertension.  The reader mentions that compared to his last echocardiogram, his EF had improved slightly and that the RVSP now was mildly increased.    The mildly elevated RVSP and RV dilatation likely due to untreated sleep apnea and obesity.  - Was on torsemide 20 mg, but self-stopped this in Jan  - CXR 2/21/20 done for shoulder pain, showed CHF  - Called 2/24/20 with increased ISBELL.  Torsemide resumed at 20 mg  - Today he is not in clear CHF, but he does have S4 on exam, edema though chronic.  NT pro BNP today.  Continue torsemide 20 mg for now.  Will repeat echo.       Increased ISBELL  - Most likely acute on chronic CHF  - Had self-stopped his torsemide in Jan  - CXR 2/21/20 showed CHF  - Now back on torsemide 20 mg  - Today he is not in clear CHF, but he does have S4 on exam, edema though chronic.  Add on NT pro BNP today  Continue torsemide 20 mg for now.  Will repeat echo.   - Should his ISBELL not improve being back on  torsemide, or if his LVEF has dropped, would then need to consider ischemic evaluation      CAD  - Continue ASA, statin  - No clear anginal pain  - Ischemic eval as noted above      HTN  - BP controlled on amlodipine 5 mg, labetalol 200 mg BID, Imdur 30 mg, torsemide 20 mg      Hyperlipidemia  - 6/2019 FLP: , HDL 63, LDL 43, TG 65  - Continue atorvastatin 40 mg      Sleep apnea  - CPAP recommended  - Weight loss      Oesity  - Weight loss      DM2      TIA           MISA McbrideC

## 2020-03-02 ENCOUNTER — TELEPHONE (OUTPATIENT)
Dept: CARDIOLOGY | Facility: CLINIC | Age: 75
End: 2020-03-02

## 2020-03-02 NOTE — TELEPHONE ENCOUNTER
Pt called and LM stating that he was due for echo and follow up OV on 3/6 but he was able to schedule appts until 3/16 (echo) and 3/18 (follow up).  He was concerned whether the timing of that was ok.  Messaged to SVETLANA Larose to advise.  KMortimer RN

## 2020-03-03 NOTE — TELEPHONE ENCOUNTER
Called pt to review follow up echo and OV.  Per SVETLANA Larose he is ok to have them as scheduled on 3/16 and 3/18.  Pt expresses understanding and denies further questions.  KMortimer RN

## 2020-03-13 ENCOUNTER — HOSPITAL ENCOUNTER (OUTPATIENT)
Facility: CLINIC | Age: 75
Discharge: HOME | End: 2020-03-13
Attending: SURGERY | Admitting: SURGERY
Payer: COMMERCIAL

## 2020-03-13 VITALS
OXYGEN SATURATION: 91 % | DIASTOLIC BLOOD PRESSURE: 67 MMHG | RESPIRATION RATE: 16 BRPM | SYSTOLIC BLOOD PRESSURE: 126 MMHG | HEART RATE: 72 BPM

## 2020-03-13 LAB
COLONOSCOPY: NORMAL
GLUCOSE BLDC GLUCOMTR-MCNC: 129 MG/DL (ref 70–99)

## 2020-03-13 PROCEDURE — 45381 COLONOSCOPY SUBMUCOUS NJX: CPT | Mod: PT | Performed by: SURGERY

## 2020-03-13 PROCEDURE — 45385 COLONOSCOPY W/LESION REMOVAL: CPT | Mod: PT | Performed by: SURGERY

## 2020-03-13 PROCEDURE — 25000128 H RX IP 250 OP 636: Performed by: SURGERY

## 2020-03-13 PROCEDURE — 88305 TISSUE EXAM BY PATHOLOGIST: CPT | Performed by: SURGERY

## 2020-03-13 PROCEDURE — 99153 MOD SED SAME PHYS/QHP EA: CPT | Performed by: SURGERY

## 2020-03-13 PROCEDURE — 88305 TISSUE EXAM BY PATHOLOGIST: CPT | Mod: 26 | Performed by: SURGERY

## 2020-03-13 PROCEDURE — 82962 GLUCOSE BLOOD TEST: CPT

## 2020-03-13 PROCEDURE — G0500 MOD SEDAT ENDO SERVICE >5YRS: HCPCS | Performed by: SURGERY

## 2020-03-13 RX ORDER — ONDANSETRON 2 MG/ML
4 INJECTION INTRAMUSCULAR; INTRAVENOUS EVERY 6 HOURS PRN
Status: DISCONTINUED | OUTPATIENT
Start: 2020-03-13 | End: 2020-03-13 | Stop reason: HOSPADM

## 2020-03-13 RX ORDER — ONDANSETRON 4 MG/1
4 TABLET, ORALLY DISINTEGRATING ORAL EVERY 6 HOURS PRN
Status: DISCONTINUED | OUTPATIENT
Start: 2020-03-13 | End: 2020-03-13 | Stop reason: HOSPADM

## 2020-03-13 RX ORDER — NALOXONE HYDROCHLORIDE 0.4 MG/ML
.1-.4 INJECTION, SOLUTION INTRAMUSCULAR; INTRAVENOUS; SUBCUTANEOUS
Status: DISCONTINUED | OUTPATIENT
Start: 2020-03-13 | End: 2020-03-13 | Stop reason: HOSPADM

## 2020-03-13 RX ORDER — PROCHLORPERAZINE MALEATE 5 MG
5 TABLET ORAL EVERY 6 HOURS PRN
Status: DISCONTINUED | OUTPATIENT
Start: 2020-03-13 | End: 2020-03-13 | Stop reason: HOSPADM

## 2020-03-13 RX ORDER — FLUMAZENIL 0.1 MG/ML
0.2 INJECTION, SOLUTION INTRAVENOUS
Status: DISCONTINUED | OUTPATIENT
Start: 2020-03-13 | End: 2020-03-13 | Stop reason: HOSPADM

## 2020-03-13 RX ORDER — METOCLOPRAMIDE HYDROCHLORIDE 5 MG/ML
10 INJECTION INTRAMUSCULAR; INTRAVENOUS EVERY 6 HOURS PRN
Status: DISCONTINUED | OUTPATIENT
Start: 2020-03-13 | End: 2020-03-13 | Stop reason: HOSPADM

## 2020-03-13 RX ORDER — FENTANYL CITRATE 50 UG/ML
INJECTION, SOLUTION INTRAMUSCULAR; INTRAVENOUS PRN
Status: DISCONTINUED | OUTPATIENT
Start: 2020-03-13 | End: 2020-03-13 | Stop reason: HOSPADM

## 2020-03-13 RX ORDER — METOCLOPRAMIDE 10 MG/1
10 TABLET ORAL EVERY 6 HOURS PRN
Status: DISCONTINUED | OUTPATIENT
Start: 2020-03-13 | End: 2020-03-13 | Stop reason: HOSPADM

## 2020-03-13 RX ORDER — LIDOCAINE 40 MG/G
CREAM TOPICAL
Status: DISCONTINUED | OUTPATIENT
Start: 2020-03-13 | End: 2020-03-13 | Stop reason: HOSPADM

## 2020-03-13 RX ORDER — ONDANSETRON 2 MG/ML
4 INJECTION INTRAMUSCULAR; INTRAVENOUS
Status: DISCONTINUED | OUTPATIENT
Start: 2020-03-13 | End: 2020-03-13 | Stop reason: HOSPADM

## 2020-03-13 NOTE — LETTER
"March 17, 2020      Curtis Ibrahim  [address] [place] [address]        Dear ,    We are writing to inform you of your test results.    Colon did show adenoma type polyps. As we had discussed, you might consider repeat colonoscopy in 3 years if you remain in good state of health.    Resulted Orders   Surgical pathology exam   Result Value Ref Range    Copath Report       Patient Name: CURTIS IBRAHIM  MR#: [id number]  Specimen #: [id number]  Collected: 3/13/2020  Received: 3/13/2020  Reported: 3/16/2020 17:50  Ordering Phy(s): TOO MAN    For improved result formatting, select 'View Enhanced Report Format' under   Linked Documents section.    SPECIMEN(S):  A: Cecal polyps  B: Colon polyp, ascending  C: Colon polyp, transverse    FINAL DIAGNOSIS:  A: Cecum, polyps, biopsy/polypectomy -  - Fragments of tubular adenoma(s); negative for high-grade dysplasia or   malignancy.    B: Ascending colon, polyp, biopsy/polypectomy -  - Fragments of tubular adenoma(s); negative for high-grade dysplasia or   malignancy.    C: Transverse colon, polyp biopsy/polypectomy -  - Fragments of tubular adenoma(s); negative for high-grade dysplasia or   malignancy.    Electronically signed out by:    Elis Regan M.D.    CLINICAL HISTORY:  Screening for colon cancer.    GROSS:  A: The specimen is received in formalin labeled with the patient's name,   identifying i nformation and  designated \"cecum polyp\".  It consists of multiple tan friable tissue   fragment, ranging from 0.3-0.8 cm.  The  specimen is filtered and submitted entirely in one block.    B: The specimen is received in formalin labeled with the patient's name,   identifying information and  designated \"ascending colon polyp\".  It consists of five tan polypoid   tissue fragments, ranging from 0.2-0.3  cm.  Submitted entirely in one block.    C: The specimen is received in formalin labeled with the patient's name,   identifying information " "and  designated \"transverse colon polyp\".  It consists of three tan polypoid   tissue fragment, ranging from 0.1-0.8  cm.  The largest fragment is longitudinally bisected and the specimen is   submitted entirely in one block.  (Dictated by: NED Jones 3/13/2020 02:46 PM)    MICROSCOPIC:  A, B, and C.  Microscopic examination is performed.    The technical component of this testing was completed at the [facility], with the professional component performed   at the [facility], [address], [place]  [address]   ([phone])    CPT Codes:  A: 90062-SH6  B: 84575-NY1  C: 81931-ZE7    COLLECTION SITE:  Client: [facility]  Location: [facility] (R)         If you have any questions or concerns, please call the clinic at the number listed above.       Sincerely,        No name on file.                "

## 2020-03-13 NOTE — H&P
"Pre-Endoscopy History and Physical     Lance Ibrahim MRN# [id number]   YOB: 1945 Age: 74 year old     Date of Procedure: 3/13/2020  Primary care provider: Anh Spivey  Type of Endoscopy: colonoscopy  Reason for Procedure: fh polyps in mother  Type of Anesthesia Anticipated: Moderate Sedation    HPI:    Lance is a 74 year old male who will be undergoing the above procedure.      A history and physical has been performed. The patient's medications and allergies have been reviewed. The risks and benefits of the procedure and the sedation options and risks were discussed with the patient.  All questions were answered and informed consent was obtained.      He denies a personal or family history of anesthesia complications or bleeding disorders.     Allergies   Allergen Reactions     Penicillins Anaphylaxis     \"anaphylactic\"     Sulfa Drugs      \"itchy rash, swelling of face and hands\"     Ancef [Cefazolin] Rash        Prior to Admission Medications   Prescriptions Last Dose Informant Patient Reported? Taking?   Ferrous Gluconate 240 (27 Fe) MG TABS 3/6/2020  Yes Yes   Sig: Take 1 tablet by mouth daily    HYDROcodone-acetaminophen (NORCO) 5-325 MG tablet   No No   Sig: Take 1 tablet by mouth every 6 hours as needed for pain   Multiple Vitamins-Minerals (MULTIVITAMIN ADULTS PO) 3/6/2020  Yes Yes   Sig: Take 1 tablet by mouth daily    amLODIPine (NORVASC) 5 MG tablet 3/12/2020 at Unknown time  No Yes   Sig: Take 1 tablet (5 mg) by mouth daily   aspirin (ASA) 81 MG EC tablet 3/12/2020 at Unknown time  No Yes   Sig: Take 1 tablet (81 mg) by mouth daily   atorvastatin 40 MG PO tablet 3/12/2020 at Unknown time  No Yes   Sig: Take 1 tablet (40 mg) by mouth daily   blood glucose (ACCU-CHEK SMARTVIEW) test strip   No No   Sig: Use to test blood sugar 1 times daily or as directed.   blood glucose monitoring (ACCU-CHEK FASTCLIX) lancets Unknown at Unknown time  No No   Sig: USE LANCETS 2 TIMES DAILY, AS " DIRECTED.   finasteride (PROSCAR) 5 MG tablet 3/12/2020 at Unknown time  Yes Yes   Sig: Take 5 mg by mouth daily.   fluticasone (FLONASE) 50 MCG/ACT spray   Yes Yes   Sig: Spray 1 spray into both nostrils nightly as needed    isosorbide mononitrate 30 MG PO 24 hr tablet 3/12/2020 at Unknown time  No Yes   Sig: Take 1 tablet (30 mg) by mouth daily   labetalol (NORMODYNE) 200 MG tablet   No Yes   Sig: Take 1 tablet (200 mg) by mouth 2 times daily   lisinopril 10 MG PO tablet 3/12/2020 at Unknown time  No Yes   Sig: Take 1 tablet (10 mg) by mouth daily   loratadine (CLARITIN) 10 MG tablet 3/12/2020 at Unknown time  Yes Yes   Sig: Take 10 mg by mouth as needed    metFORMIN 1000 MG PO tablet 3/12/2020 at Unknown time  No Yes   Sig: Take 0.5 tablets (500 mg) by mouth 2 times daily (with meals)   Patient taking differently: Take 1,000 mg by mouth 2 times daily (with meals)    silver sulfADIAZINE (SILVADENE) 1 % external cream Unknown at Unknown time  No No   Sig: Apply topically daily   tamsulosin (FLOMAX) 0.4 MG 24 hr capsule 3/12/2020 at Unknown time  Yes Yes   Sig: Take 1 capsule by mouth daily.   torsemide (DEMADEX) 20 MG tablet 3/12/2020 at Unknown time  No Yes   Sig: Take 1 tablet (20 mg) by mouth daily      Facility-Administered Medications: None       Patient Active Problem List   Diagnosis     Ventral hernia     Chronic rhinitis     Hypertrophy of prostate with urinary obstruction     Transient cerebral ischemia     Essential hypertension     CARDIOVASCULAR SCREENING; LDL GOAL LESS THAN 100     Gout     Type 2 diabetes, HbA1c goal < 7% (H)     Cervicalgia     Amputated toe, left (H)     Type 2 diabetes mellitus with peripheral vascular disease (H)     Total knee replacement status     Dyspnea on exertion     Chest pain, unspecified type     Cardiomyopathy, unspecified type (H)     Shortness of breath     Renal colic     Obesity (BMI 35.0-39.9) with comorbidity (H)     CKD (chronic kidney disease) stage 3, GFR  30-59 ml/min (H)        Past Medical History:   Diagnosis Date     Abnormal stress test 3/4/2019    Added automatically from request for surgery [id number]     Arthritis     osteoarthritis knees     CAD (coronary artery disease)     subtotal occlusion in the small distal LAD      Cardiomyopathy (H)      Cerebral artery occlusion with cerebral infarction (H)     TIA 1993, no residual     Chest pain      Chronic rhinitis      ISBELL (dyspnea on exertion)      Dyspnea 02/17/2019     Edema      HTN (hypertension)      Hyperlipidemia      Hypertrophy of prostate with urinary obstruction and other lower urinary tract symptoms (LUTS)      Nephrolithiasis      Orthopnea      PVD (peripheral vascular disease) (H)      Renal colic      S/P cardiac cath 03/05/2019    subtotal occlusion in the small distal LAD      Sleep apnea     doesn't use cpap     TIA (transient ischaemic attack) 1993     Type 2 diabetes mellitus with peripheral vascular disease (H) 11/8/2018     Unspecified transient cerebral ischemia 1993    No definite source found     Ureteral stone         Past Surgical History:   Procedure Laterality Date     AMPUTATE TOE(S) Left 10/15/2018    Procedure: AMPUTATE TOE(S);  Left third toe amputation;  Surgeon: Ayaka Azevedo DPM, Podiatry/Foot and Ankle Surgery;  Location: [facility] OR     ARTHROPLASTY KNEE Right 12/7/2018    Procedure: Right total knee arthroplasty;  Surgeon: Issa Cunha MD;  Location: [facility] OR     C NONSPECIFIC PROCEDURE  1985    Diastasis recti repair     C NONSPECIFIC PROCEDURE  1987    Ventral hernia repair     C NONSPECIFIC PROCEDURE      ORIF of left shoulder     COLONOSCOPY  3/9/2013    Procedure: COLONOSCOPY;  COLONOSCOPY;  Surgeon: Chau Hogan MD;  Location:  GI     CV HEART CATHETERIZATION WITH POSSIBLE INTERVENTION Left 3/5/2019    Procedure: Coronary Angiogram;  Surgeon: Nas Linda MD;  Location:  HEART CARDIAC CATH LAB     EYE SURGERY  03/13/2017    left eye cataract     EYE  "SURGERY      Right eye cataract     FOOT SURGERY  2013    cyst removal      HERNIA REPAIR  2004    ventral        Social History     Tobacco Use     Smoking status: Former Smoker     Packs/day: 0.00     Last attempt to quit: 3/17/1993     Years since quittin.0     Smokeless tobacco: Never Used   Substance Use Topics     Alcohol use: Not Currently       Family History   Problem Relation Age of Onset     Family History Negative Mother          88 yo     Cancer Father          76 yo brain     Diabetes Maternal Grandfather          91 yo     Alcohol/Drug Paternal Grandfather              Colon Cancer No family hx of        REVIEW OF SYSTEMS:     5 point ROS negative except as noted above in HPI, including Gen., Resp., CV, GI &  system review.      PHYSICAL EXAM:   /66   Pulse 75   Resp 21   SpO2 93%  Estimated body mass index is 35.84 kg/m  as calculated from the following:    Height as of 20: 1.803 m (5' 11\").    Weight as of 20: 116.6 kg (257 lb).   GENERAL APPEARANCE: healthy, alert and no distress  MENTAL STATUS: alert  AIRWAY EXAM: Mallampatti Class III (visualization of the soft palate and base of uvula)  RESP: lungs clear to auscultation - no rales, rhonchi or wheezes  CV: regular rates and rhythm      DIAGNOSTICS:    Not indicated      IMPRESSION   ASA Class 3 - Severe systemic disease, but not incapacitating        PLAN:       Plan for colonoscopy. We discussed the risks, benefits and alternatives and the patient wished to proceed.    The above has been forwarded to the consulting provider.      Signed Electronically by: Aldo Benites MD  2020    "

## 2020-03-13 NOTE — DISCHARGE INSTRUCTIONS
Understanding Colon and Rectal Polyps     The colon has a smooth lining composed of millions of cells.     The colon (also called the large intestine) is a muscular tube that forms the last part of the digestive tract. It absorbs water and stores food waste. The colon is about 4 to 6 feet long. The rectum is the last 6 inches of the colon. The colon and rectum have a smooth lining composed of millions of cells. Changes in these cells can lead to growths in the colon that can become cancerous and should be removed.     When the Colon Lining Changes  Changes that occur in the cells that line the colon or rectum can lead to growths called polyps. Over a period of years, polyps can turn cancerous. Removing polyps early may prevent cancer from ever forming.      Polyps  Polyps are fleshy clumps of tissue that form on the lining of the colon or rectum. Small polyps are usually benign (not cancerous). However, over time, cells in a polyp can change and become cancerous. The larger a polyp grows, the more likely this is to happen. Also, certain types of polyps known as adenomatous polyps are considered premalignant. This means that they will almost always become cancerous if they re not removed.          Cancer  Almost all colorectal cancers start when polyp cells begin growing abnormally. As a cancerous tumor grows, it may involve more and more of the colon or rectum. In time, cancer can also grow beyond the colon or rectum and spread to nearby organs or to glands called lymph nodes. The cells can also travel to other parts of the body. This is known as metastasis. The earlier a cancerous tumor is removed, the better the chance of preventing its spread.        [id number] [facility], [address] [place] [address], [place] [address]. All rights reserved. This information is not intended as a substitute for professional medical care. Always follow your healthcare professional's instructions.    Understanding Diverticulosis and  Diverticulitis     Pouches or diverticula usually occur in the lower part of the colon called the sigmoid.      Diverticulitis occurs when the pouches become inflamed.     The colon (large intestine) is the last part of the digestive tract. It absorbs water from stool and changes it from a liquid to a solid. In certain cases, small pouches called diverticula can form in the colon wall. This condition is called diverticulosis. The pouches can become infected. If this happens, it becomes a more serious problem called diverticulitis. These problems can be painful. But they can be managed.   Managing Your Condition  Diet changes or taking medications are often tried first. These may be enough to bring relief. If the case is bad, surgery may be done. You and your doctor can discuss the plan that is best for you.  If You Have Diverticulosis  Diet changes are often enough to control symptoms. The main changes are adding fiber (roughage) and drinking more water. Fiber absorbs water as it travels through your colon. This helps your stool stay soft and move smoothly. Water helps this process. If needed, you may be told to take over-the-counter stool softeners. To help relieve pain, antispasmodic medications may be prescribed.  If You Have Diverticulitis  Treatment depends on how bad your symptoms are.  For mild symptoms: You may be put on a liquid diet for a short time. You may also be prescribed antibiotics. If these two steps relieve your symptoms, you may then be prescribed a high-fiber diet. If you still have symptoms, your doctor will discuss further treatment options with you.  For severe symptoms: You may need to be admitted to the hospital. There, you can be given IV antibiotics and fluids. Once symptoms are under control, the above treatments may be tried. If these don t control your condition, your doctor may discuss the option of having surgery with you.  [place] [facility] Colon Health  Help keep your colon healthy with a  diet that includes plenty of high-fiber fruits, vegetables, and whole grains. Drink plenty of liquids like water and juice. Your doctor may also recommend avoiding seeds and nuts.          [id number] [employer], [address] [place] [address], [place] [address]. All rights reserved. This information is not intended as a substitute for professional medical care. Always follow your healthcare professional's instructions.    Eating a High-Fiber Diet  Fiber is what gives strength and structure to plants. Most grains, beans, vegetables, and fruits contain fiber. Foods rich in fiber are often low in calories and fat, and they fill you up more. They may also reduce your risks for certain health problems. To find out the amount of fiber in canned, packaged, or frozen foods, read the  Nutrition Facts  label. It tells you how much fiber is in a serving.      Types of Fiber and Their Benefits  There are two types of fiber: insoluble and soluble. They both aid digestion and help you maintain a healthy weight.  Insoluble fiber: This is found in whole grains, cereals, certain fruits and vegetables (such as apple skin, corn, and carrots). Insoluble fiber may prevent constipation and reduce the risk of certain types of cancer.   Soluble fiber: This type of fiber is in oats, beans, and certain fruits and vegetables (such as strawberries and peas). Soluble fiber can reduce cholesterol (which may help lower the risk of heart disease), and helps control blood sugar levels.  Look for High-Fiber Foods  Whole-grain breads and cereals: Try to eat 6-8 ounces a day. Include wheat and oat bran cereals, whole-wheat muffins or toast, and corn tortillas in your meals.  Fruits: Try to eat 2 cups a day. Apples, oranges, strawberries, pears, and bananas are good sources. (Note: Fruit juice is low in fiber.)  Vegetables: Try to eat 3 cups a day. Add asparagus, carrots, broccoli, peas, and corn to your meals.  Legumes (beans): One cup of cooked lentils  gives you over 15 grams of fiber. Try navy beans, lentils, and chickpeas.  Seeds:  A small handful of seeds gives you about 3 grams of fiber. Try sunflower seeds.    Keep Track of Your Fiber  A healthy diet includes 31 grams of fiber a day if you have a 2,000-calorie diet. Keep track of how much fiber you eat. Start by reading food labels. Then eat a variety of foods high in fiber. Ask your doctor about supplemental fiber products.            [id number] [employer], [address] [place] [address], [place] [address]. All rights reserved. This information is not intended as a substitute for professional medical care. Always follow your healthcare professional's instructions.  .rbhe

## 2020-03-13 NOTE — LETTER
February 25, 2020      Lance Ibrahim  [address]   [place] [address]        Dear Lance,     Thank you for choosing [facility]. You are scheduled for the following service(s).   Please be aware that coverage of these services is subject to the terms and limitations of your health insurance plan.  Call member services at your health plan with any benefit or coverage questions.  Date:  3/13/2020       Procedure: COLONOSCOPY  Doctor:   Dr. Benites         Arrival Time:  9:00 am   *check in at Emergency/Endoscopy desk*  Procedure Time:  9:30 am    Location:   [facility]        Endoscopy Department, First Floor (Enter through ER Doors) *         [address] [place], [place] [id number] [phone] or [phone] ([facility]) to reschedule        NuLYTELY  PREP  Colonoscopy is the most accurate test to detect colon polyps and colon cancer; and the only test where polyps can be removed. During this procedure, a doctor examines the lining of your large intestine and rectum through a flexible tube.         Transportation  You must arrange for a ride for the day of your procedure with a responsible adult. A taxi , Uber, etc, is not an option unless you are accompanied by a responsible adult. If you fail to arrange transportation with a responsible adult, your procedure will be cancelled and rescheduled.    Fill your enclosed prescription for NuLYTELY  at your local pharmacy. Please call our office at [phone] if you did not receive a prescription.    COLONOSCOPY PRE-PROCEDURE CHECKLIST  If you have diabetes, ask your regular doctor for diet and medication restrictions.  If you take an anticoagulant or anti-platelet medication (such as Coumadin , Lovenox , Pradaxa , Xarelto , Eliquis , etc.), please call your primary doctor for advice on holding this medication.  If you take aspirin you may continue to do so.  If you are or may be pregnant, please  discuss the risks and benefits of this procedure with your doctor.    PREPARATION FOR COLONOSCOPY    7 days before:    Discontinue fiber supplements and medications containing iron. This includes Metamucil  and Fibercon ; and multivitamins with iron.  3 days before:    Begin a low-fiber diet. A low-fiber diet helps making the cleanout more effective. For additional details on low-fiber diet, please refer to the table on the last page.  2 days before:    Continue the low-fiber diet.     Drink at least 8 glasses of water throughout the day.     Stop eating solid foods at 11:45 pm.  1 day before:    In the morning: begin a clear liquid diet (liquids you can see through).     Examples of a clear liquid diet include: water, clear broth or bouillon, Gatorade, Pedialyte or Powerade, carbonated and non-carbonated soft drinks (Sprite , 7-Up , ginger ale), strained fruit juices without pulp (apple, white grape, white cranberry), Jell-O  and popsicles.     The following are not allowed on a clear liquid diet: red liquids, alcoholic beverages,  dairy products (milk, creamer, and yogurt), protein shakes,  juice with pulp and chewing tobacco.    At 4pm: drink 1 (one) 8 oz glass of NuLYTELY  solution every 15 minutes (approximately 8 glasses of 8 oz or half the bottle). Keep the solution refrigerated. Do not drink any other liquids while you are drinking the NuLYTELY  solution.    Over the course of the evening, drink an additional   liter of clear liquids and continue clear liquid diet.    Day of procedure:    6 hours before the procedure: drink 1 (one) 8 oz glass of NuLYTELY  solution every 15 minutes until the last half of the bottle (approximately 8 glasses of 8 oz) is gone. Keep the solution refrigerated. Do not drink any other liquids while you are drinking the NuLYTELY  solution. After that, you may continue the clear liquid diet until 4 hours before the procedure.      You may take all of your morning medications including  blood pressure medications, blood thinners (if you have not been instructed to stop these by our office), methadone, and anti-seizure medications with sips of water 4 hours prior to your procedure or earlier. Do not take insulin or vitamins prior to your procedure.       4 hours prior:   o STOP consuming all liquids   o Do not take anything by mouth during this time.   o Allow extra time to travel to your procedure as you may need to stop and use a restroom along the way.  You are ready for the procedure, if you followed all instructions and your stool is no longer formed, but clear or yellow liquid. If you are unsure whether your colon is clean, please call our office at [phone] before you leave for your appointment.    COLON CLEANSING TIPS: drink adequate amounts of fluids before and after your colon cleansing to prevent dehydration. Stay near a toilet because you will have diarrhea. Even if you are sitting on the toilet, continue to drink the cleansing solution every 15 minutes. If you feel nauseous or vomit, rinse your mouth with water, take a 15 to 30-minute-break and then continue drinking the solution. You will be uncomfortable until the stool has flushed from your colon (in about 2 to 4 hours). You may feel chilled.    Bring the following to your procedure:  - Insurance Card/Photo ID.   - List of current medications including over-the-counter medications and supplements.   - Your rescue inhaler if you currently use one to control asthma.    Canceling or rescheduling your appointment:   If you must cancel or reschedule your appointment, please call [phone] as soon as possible.      What happens during a colonoscopy?    Plan to spend up to two hours, starting at registration time, at the endoscopy center the day of your procedure. The colonoscopy takes an average of 15 to 30 minutes. Recovery time is about 30 minutes.    Before the exam:    You will change into a gown.    Your medical history and  medication list will be reviewed with you, unless that has been done over the phone prior to the procedure.     A nurse will insert an intravenous (IV) line into your hand or arm.    The doctor will meet with you and will give you a consent form to sign.    During the exam:     Medicine will be given through the IV line to help you relax.     Your heart rate and oxygen levels will be monitored. If your blood pressure is low, you may be given fluids through the IV line.     The doctor will insert a flexible hollow tube, called a colonoscope, into your rectum. The scope will be advanced slowly through the large intestine (colon).    You may have a feeling of fullness or pressure.     If an abnormal tissue or a polyp is found, the doctor may remove it through the endoscope for closer examination, or biopsy. Tissue removal is painless.    After the exam:           Any tissue samples removed during the exam will be sent to a lab for evaluation. It may take 5-7 working days for you to be notified of the results.     A nurse will provide you with complete discharge instructions before you leave the endoscopy center. Be sure to ask the nurse for specific instructions if you take blood thinners such as Aspirin, Coumadin or Plavix.     The doctor will prepare a full report for you and for the physician who referred you for the procedure.     Your doctor will talk with you about the initial results of your exam.      Medication given during the exam will prohibit you from driving for the rest of the day.     Following the exam, you may resume your normal diet. Your first meal should be light, no greasy foods. Avoid alcohol until the next day.     You may resume your regular activities the day after the procedure.     LOW-FIBER DIET  Foods RECOMMENDED Foods to AVOID   Breads, Cereal, Rice and Pasta:   White bread, rolls, biscuits, croissant and corwin toast.   Waffles, Armenian toast and pancakes.   White rice, noodles, pasta,  macaroni and peeled cooked potatoes.   Plain crackers and saltines.   Cooked cereals: farina, cream of rice.   Cold cereals: Puffed Rice , Rice Krispies , Corn Flakes  and Special K    Breads, Cereal, Rice and Pasta:   Breads or rolls with nuts, seeds or fruit.   Whole wheat, pumpernickel, rye breads and cornbread.   Potatoes with skin, brown or wild rice, and kasha (buckwheat).     Vegetables:   Tender cooked and canned vegetables without seeds: carrots, asparagus tips, green or wax beans, pumpkin, spinach, lima beans. Vegetables:   Raw or steamed vegetables.   Vegetables with seeds.   Sauerkraut.   Winter squash, peas, broccoli, Brussel sprouts, cabbage, onions, cauliflower, baked beans, peas and corn.   Fruits:   Strained fruit juice.   Canned fruit, except pineapple.   Ripe bananas and melon. Fruits:   Prunes and prune juice.   Raw fruits.   Dried fruits: figs, dates and raisins.   Milk/Dairy:   Milk: plain or flavored.   Yogurt, custard and ice cream.   Cheese and cottage cheese Milk/Dairy:     Meat and other proteins:   ground, well-cooked tender beef, lamb, ham, veal, pork, fish, poultry and organ meats.   Eggs.   Peanut butter without nuts. Meat and other proteins:   Tough, fibrous meats with gristle.   Dry beans, peas and lentils.   Peanut butter with nuts.   Tofu.   Fats, Snack, Sweets, Condiments and Beverages:   Margarine, butter, oils, mayonnaise, sour cream and salad dressing, plain gravy.   Sugar, hard candy, clear jelly, honey and syrup.   Spices, cooked herbs, bouillon, broth and soups made with allowed vegetable, ketchup and mustard.   Coffee, tea and carbonated drinks.   Plain cakes, cookies and pretzels.   Gelatin, plain puddings, custard, ice cream, sherbet and popsicles. Fats, Snack, Sweets, Condiments and Beverages:   Nuts, seeds and coconut.   Jam, marmalade and preserves.   Pickles, olives, relish and horseradish.   All desserts containing nuts, seeds, dried fruit and coconut; or made from  whole grains or bran.   Candy made with nuts or seeds.   Popcorn.       DIRECTIONS TO THE ENDOSCOPY DEPARTMENT    From the north ([place], [facility])  Take [address] South, exit on [address] . Get into the left hand germania, turn left (east), go one-half mile to [address] and turn left. Go north to the first stoplight, take a right on [address] and follow it to the Emergency entrance.    From the south ([facility])  Take 35N to the 35E split and exit on [address] . On [address] , turn left (west) to [address]. Turn right (north) on [address]. Go north to the first stoplight, take a right on [address] and follow it to the Emergency entrance.    From the east via 35E ([facility])  Take [address]E south to [address]  exit. Turn right on [address]. Go west to [address]. Turn right (north) on [address]. Go to the first stoplight, take a right and follow on [address] to the Emergency entrance.    From the east via [address] ([facility])  Take [address] West to [address]. Turn left (south) on [address] to [address]. Turn left (east) on [address] and follow it to the Emergency entrance.    From the west via [address] ([place])  Take [address] east to [address]. Turn right (south) on [address] to [address]. Turn left (east) on [address] and follow it to the Emergency entrance.

## 2020-03-16 ENCOUNTER — HOSPITAL ENCOUNTER (OUTPATIENT)
Dept: CARDIOLOGY | Facility: CLINIC | Age: 75
Discharge: HOME | End: 2020-03-16
Attending: PHYSICIAN ASSISTANT | Admitting: PHYSICIAN ASSISTANT
Payer: COMMERCIAL

## 2020-03-16 ENCOUNTER — TELEPHONE (OUTPATIENT)
Dept: CARDIOLOGY | Facility: CLINIC | Age: 75
End: 2020-03-16

## 2020-03-16 DIAGNOSIS — R06.02 SHORTNESS OF BREATH: ICD-10-CM

## 2020-03-16 DIAGNOSIS — I42.9 CARDIOMYOPATHY, UNSPECIFIED TYPE (H): ICD-10-CM

## 2020-03-16 LAB — COPATH REPORT: NORMAL

## 2020-03-16 PROCEDURE — 93306 TTE W/DOPPLER COMPLETE: CPT | Mod: 26 | Performed by: INTERNAL MEDICINE

## 2020-03-16 PROCEDURE — 93306 TTE W/DOPPLER COMPLETE: CPT

## 2020-03-16 NOTE — TELEPHONE ENCOUNTER
Pt scheduled 3/18/20 for OV sarina/ Stanley Ortega PA-C. Reviewed sarina/ lucía Angel for telephone visit.     Wellness Screening Tool    Symptom Screening:    Do you have a:    Fever?   no    Cough?  no    Shortness of breath?  SOB, not new - has been for 3 weeks.     Skin rash?  no      Within the past 14 days, have you been in contact with someone who:    Is currently being ruled out for COVID-19 (novel coronavirus)?   no    Has tested positive for COVID-19?  no    Has symptoms of a respiratory illness (fever, cough, shortness of breath)?  no    Have you or someone you have been in contact with traveled to an area with COVID-19:    Refer to the CDC Coronavirus webpage for COVID-19 areas:  no    Within the past 3 weeks, have you been exposed to the following:      Pertussis? no    Chicken pox?   no    Measles?  no    Patient's appointment status: appointment converted to phone visit.   Manuel PAULA, BSN  03/16/20 4:31 PM

## 2020-03-18 ENCOUNTER — VIRTUAL VISIT (OUTPATIENT)
Dept: CARDIOLOGY | Facility: CLINIC | Age: 75
End: 2020-03-18
Attending: PHYSICIAN ASSISTANT
Payer: COMMERCIAL

## 2020-03-18 ENCOUNTER — OFFICE VISIT (OUTPATIENT)
Dept: PODIATRY | Facility: CLINIC | Age: 75
End: 2020-03-18
Payer: COMMERCIAL

## 2020-03-18 ENCOUNTER — TELEPHONE (OUTPATIENT)
Dept: INTERNAL MEDICINE | Facility: CLINIC | Age: 75
End: 2020-03-18

## 2020-03-18 VITALS
DIASTOLIC BLOOD PRESSURE: 76 MMHG | BODY MASS INDEX: 35 KG/M2 | HEIGHT: 71 IN | WEIGHT: 250 LBS | SYSTOLIC BLOOD PRESSURE: 120 MMHG | HEART RATE: 85 BPM

## 2020-03-18 VITALS
WEIGHT: 257 LBS | DIASTOLIC BLOOD PRESSURE: 60 MMHG | HEIGHT: 71 IN | SYSTOLIC BLOOD PRESSURE: 116 MMHG | BODY MASS INDEX: 35.98 KG/M2

## 2020-03-18 DIAGNOSIS — E11.42 DIABETIC POLYNEUROPATHY ASSOCIATED WITH TYPE 2 DIABETES MELLITUS (H): Primary | ICD-10-CM

## 2020-03-18 DIAGNOSIS — E11.621 DIABETIC ULCER OF OTHER PART OF RIGHT FOOT ASSOCIATED WITH TYPE 2 DIABETES MELLITUS, WITH FAT LAYER EXPOSED (H): ICD-10-CM

## 2020-03-18 DIAGNOSIS — I10 ESSENTIAL HYPERTENSION: ICD-10-CM

## 2020-03-18 DIAGNOSIS — I87.2 EDEMA OF BOTH LOWER EXTREMITIES DUE TO PERIPHERAL VENOUS INSUFFICIENCY: ICD-10-CM

## 2020-03-18 DIAGNOSIS — M20.21 HALLUX RIGIDUS OF RIGHT FOOT: ICD-10-CM

## 2020-03-18 DIAGNOSIS — L97.512 DIABETIC ULCER OF OTHER PART OF RIGHT FOOT ASSOCIATED WITH TYPE 2 DIABETES MELLITUS, WITH FAT LAYER EXPOSED (H): ICD-10-CM

## 2020-03-18 DIAGNOSIS — I42.9 CARDIOMYOPATHY, UNSPECIFIED TYPE (H): ICD-10-CM

## 2020-03-18 DIAGNOSIS — E11.9 TYPE 2 DIABETES MELLITUS WITHOUT COMPLICATION, WITHOUT LONG-TERM CURRENT USE OF INSULIN (H): ICD-10-CM

## 2020-03-18 DIAGNOSIS — R06.02 SHORTNESS OF BREATH: ICD-10-CM

## 2020-03-18 PROCEDURE — 99212 OFFICE O/P EST SF 10 MIN: CPT | Mod: TEL | Performed by: PHYSICIAN ASSISTANT

## 2020-03-18 PROCEDURE — 99212 OFFICE O/P EST SF 10 MIN: CPT | Mod: 25 | Performed by: PODIATRIST

## 2020-03-18 PROCEDURE — 11042 DBRDMT SUBQ TIS 1ST 20SQCM/<: CPT | Performed by: PODIATRIST

## 2020-03-18 RX ORDER — LABETALOL 200 MG/1
200 TABLET, FILM COATED ORAL 2 TIMES DAILY
Qty: 180 TABLET | Refills: 3 | Status: SHIPPED | OUTPATIENT
Start: 2020-03-18 | End: 2021-04-26

## 2020-03-18 RX ORDER — AMLODIPINE BESYLATE 5 MG/1
TABLET ORAL
Qty: 90 TABLET | Refills: 3 | Status: SHIPPED | OUTPATIENT
Start: 2020-03-18 | End: 2021-04-25

## 2020-03-18 RX ORDER — TORSEMIDE 20 MG/1
TABLET ORAL
Qty: 90 TABLET | Refills: 3 | Status: SHIPPED | OUTPATIENT
Start: 2020-03-18 | End: 2021-05-07

## 2020-03-18 ASSESSMENT — MIFFLIN-ST. JEOR
SCORE: 1922.87
SCORE: 1890.86

## 2020-03-18 NOTE — LETTER
"    3/18/2020         RE: Lance Ibrahim  [address]  [place] [address]        Dear Colleague,    Thank you for referring your patient, Lance Ibrahim, to the [facility]. Please see a copy of my visit note below.    Podiatry / Foot and Ankle Surgery Progress Note    March 18, 2020    Subject: Lance Ibrahim is a 74 year old male who presents to clinic for chronic ulcer right foot. Notes that it drains every day. Uses silvadene cream on it. No pain due to neuropathy. Denies fever, chills nausa. Notes he wears inserts all the time.     Objective:  Vitals: Ht 1.803 m (5' 11\")   Wt 116.6 kg (257 lb)   BMI 35.84 kg/m    BMI= Body mass index is 35.84 kg/m .    A1C:  7.7 (2/2020)    General:  Patient is alert and orientated.  NAD    Vascular:  DP and PT pulses are palpable.  Edema and varicosities noted.  CFT's < 3secs.  Skin temp is normal      Neuro:  Light and gross touch sensation is absent to feet.      Derm:  Full thickness ulcer to the plantar right interphalangeal joint measures 2.0cm x 0.5cm x 0.2cm after debridement (same).  base of wound is granular. No redness, purulent drainage or malodor noted. copious amounts of clear serous drainage.      Musculoskeletal:  Decrease arch height. Lateral deviation of the right and left halluxes.     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 Patient only in shoes, not offloading.  Again discussed that we need to get pressure off the area for the wound to heal. talked about diabetic shoes and inserts. Was given an order for new shoes and inserts. Recommend daily dressing changes with geremias, 2x2 gauze, and tape.       Follow up in 5 weeks. If he develops systemic signs of " infection, recommend going to Emergency department.      Procedure: After verbal consent, excisional debridement was performed on ulcer.  #15 blade was used to debride ulcer down to and including subcutaneous tissue. Bleeding controlled with light pressure.   No drainage noted.  No anesthesia was used due to neuropathy. Dry dressing applied to foot.  Patient tolerated procedure well.     Ayaka Azevedo DPM, Podiatry/Foot and Ankle Surgery    Weight management plan: Patient was referred to their PCP to discuss a diet and exercise plan.    Recommended to Lance Ibrahim to follow up with Primary Care provider regarding elevated blood pressure.      Again, thank you for allowing me to participate in the care of your patient.        Sincerely,        Ayaka Azevedo DPM, Podiatry/Foot and Ankle Surgery

## 2020-03-18 NOTE — TELEPHONE ENCOUNTER
Patient calls requesting new RX for Metformin Qty. 90.  He is taking two tablets per day.    Requested Prescriptions   Pending Prescriptions Disp Refills     metFORMIN (GLUCOPHAGE) 1000 MG tablet  Last Written Prescription Date:  02/18/20  Last Fill Quantity: 90,  # refills: 1   Last office visit: 2/19/2020 with prescribing provider:  02/19/20   Future Office Visit:   Next 5 appointments (look out 90 days)    Mar 18, 2020  3:50 PM CDT  Telephone Visit with Stanley Ortega PA-C  [facility] ([facility]) [address] [address] [address] [phone]          90 tablet 1     Sig: Take 0.5 tablets (500 mg) by mouth 2 times daily (with meals)       There is no refill protocol information for this order

## 2020-03-18 NOTE — PROGRESS NOTES
"Podiatry / Foot and Ankle Surgery Progress Note    March 18, 2020    Subject: Lance Ibrahim is a 74 year old male who presents to clinic for chronic ulcer right foot. Notes that it drains every day. Uses silvadene cream on it. No pain due to neuropathy. Denies fever, chills nausa. Notes he wears inserts all the time.     Objective:  Vitals: Ht 1.803 m (5' 11\")   Wt 116.6 kg (257 lb)   BMI 35.84 kg/m    BMI= Body mass index is 35.84 kg/m .    A1C:  7.7 (2/2020)    General:  Patient is alert and orientated.  NAD    Vascular:  DP and PT pulses are palpable.  Edema and varicosities noted.  CFT's < 3secs.  Skin temp is normal      Neuro:  Light and gross touch sensation is absent to feet.      Derm:  Full thickness ulcer to the plantar right interphalangeal joint measures 2.0cm x 0.5cm x 0.2cm after debridement (same).  base of wound is granular. No redness, purulent drainage or malodor noted. copious amounts of clear serous drainage.      Musculoskeletal:  Decrease arch height. Lateral deviation of the right and left halluxes.     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 Patient only in shoes, not offloading.  Again discussed that we need to get pressure off the area for the wound to heal. talked about diabetic shoes and inserts. Was given an order for new shoes and inserts. Recommend daily dressing changes with geremias, 2x2 gauze, and tape.       Follow up in 5 weeks. If he develops systemic signs of infection, recommend going to Emergency department.      Procedure: After verbal consent, excisional debridement was performed on ulcer.  #15 blade was used to debride ulcer down to and including subcutaneous tissue. Bleeding controlled with " light pressure.   No drainage noted.  No anesthesia was used due to neuropathy. Dry dressing applied to foot.  Patient tolerated procedure well.     Ayaka Azevedo DPM, Podiatry/Foot and Ankle Surgery    Weight management plan: Patient was referred to their PCP to discuss a diet and exercise plan.    Recommended to Lance Ibrahim to follow up with Primary Care provider regarding elevated blood pressure.

## 2020-03-18 NOTE — PATIENT INSTRUCTIONS
Thank you for choosing [facility] Podiatry / Foot & Ankle Surgery!    DR. RANDHAWA'S CLINIC SCHEDULE  MONDAY AM - [place] TUESDAY - [place]   [address] [address] [place] [address] [place] [address]   [phone] / [phone] / [phone]       WEDNESDAY - [place] FRIDAY AM - WOUND CENTER   [address]   [place] [address] [place] [address]   [phone] / [phone]       FRIDAY PM - [place] SCHEDULE SURGERY: [phone]   [address] BILLING QUESTIONS: [phone]   [place] [id number] AFTER HOURS: [phone]   [phone] / [phone] CONSUMER PRICE LINE: [phone]     APPOINTMENTS: [phone]     Follow up:  5 weeks  Diagnosis: Ulcer      Please read through the following handouts and if you have any questions, please feel free to call us or send a [employer] message!      FOOT ULCER (WOUND) EDUCATION  Ulceration ofthe foot involves a break or hole in the skin. Skin is our best protection against infection. Skin is quite durable, however, the underlying tissues are fragile. For this reason, the wound is likely to deepen rapidly. Deep wounds usually get infected and require amputation. Prompt healing is therefore essential to avoid limb loss.     Foot ulcers do not heal without intervention. Walking on the foot and living your normal life is not typically compatible with healing the sore. Successful healing will require several months of significant alteration of your daily activities.   Ulcer complications frequently develop. This primarily includes infection of skin, which then spreads deep into your joints, bones and tendons. Spreading infection may travel up your leg and into other parts of your body. Deep infection is usually treated with amputation ofpart ofyour foot or your leg. Signs of infection include fever, chills, nausea, vomiting, erratic blood sugars, local redness,  pus, strong odor and localized warmth. Signs of infection should be taken seriously. Prompt evaluation in the clinic or hospital emergency room is required.   Ulcer treatment requires debridement or surgical removal of devitalized tissue. Your doctor will trim away callused, moistened, unhealthy tissue from the wound surface and margin. This helps to clean the wound and allows proper inspection. Debridement also stimulates healing even though the wound originally appears larger. Expect some bleeding with each debridement. You will be given instruction regarding wound bandaging. This often includes ointment and gauze. Avoid tape directly on the skin. Hand washing is essential since most infections will come from your fingertips. Ulcer care requires a no touch technique. Your fingers should not touch the margin or base of the wound.    HELPFUL HEALING TIPS:  1. Debridement: Getting rid of bad tissue makes way for good tissue to promote healing  2. Addressing Foot Deformities: Hammertoes and bunions can cause increased pressure  3. Pressure Reduction: If pressure remains to the wound, it won't heal  4. Good Pulses: If bloodflow is not getting to the foot, the ulcer will not heal  5. Good Nutrition: If you are not getting proper nutrition your body can't heal.Protein! At least 90g a day.  Supplements are a good way to help get this, such as Antonio, Glucerna, Ensure. Also taking 5000units of Vitamin D a day.   6. Infection Control: Keep the ulcer clean with wound cleanser. DO NOT SOAK IT!  7. Moisture Control: Keep edema down and make sure that drainage is getting pulled away from the ulcer    IMPORTANCE OF DEBRIDEMENT    Reduces bioburden to help control or reduce infection. Even if an ulcer is not  infected,  the bacterial bioburden causes increased local inflammation.    Allows more accurate visualization of the wound base and edges, which allows for more precise staging.    Removes necrotic/non-viable tissue, which  impedes wound healing, causes protein loss and can be a nidus for infection.    Stimulates new circulation (angiogenesis) and allows adequate oxygen delivery to the wound.    Removes undermining and tunneling, and may help reduce abscess formation.    Releases healing growth factors at the edge of the wound.    Prepares the wound bed by leaving only tissues that are capable of regenerating.              BODY WEIGHT AND YOUR FEET  The following information is included in the after visit summary for all patients. Body weight can be a sensitive issue to discuss in clinic, but we think the following information is very important. Although we focus on the feet and ankles, we do support the overall health of our patients.     Many things can cause foot and ankle problems. Foot structure, activity level, foot mechanics and injuries are common causes of pain. One very important issue that often goes unmentioned, is body weight. Extra weight can cause increased stress on muscles, ligaments, bones and tendons. Sometimes just a few extra pounds is all it takes to put one over her/his threshold. Without reducing that stress, it can be difficult to alleviate pain. As Foot & Ankle specialists, our job is addressing the lower extremity problem and possible causes. Regarding extra body weight, we encourage patients to discuss diet and weight management plans with their primary care doctors. It is this team approach that gives you the best opportunity for pain relief and getting you back on your feet.      [place] has a Comprehensive Weight Management Program. This program includes counseling, education, non-surgical and surgical approaches to weight loss. If you are interested in learning more either talk to you primary care provider or call [phone].

## 2020-03-18 NOTE — TELEPHONE ENCOUNTER
"Patient calls requesting new RX for Metformin Qty. 90.  He is taking two tablets per day.      Requested Prescriptions   Pending Prescriptions Disp Refills     metFORMIN (GLUCOPHAGE) 1000 MG tablet 90 tablet 1     Sig: Take 0.5 tablets (500 mg) by mouth 2 times daily (with meals)       Biguanide Agents Failed - 3/18/2020  9:51 AM        Failed - Patient has had a Microalbumin in the past 15 mos.     Recent Labs   Lab Test 03/05/16  0945   MICROL 72   UMALCR 50.35*             Passed - Blood pressure less than 140/90 in past 6 months     BP Readings from Last 3 Encounters:   03/18/20 116/60   03/13/20 126/67   02/28/20 103/61                 Passed - Patient has documented LDL within the past 12 mos.     Recent Labs   Lab Test 06/25/19  0754   LDL 43             Passed - Patient is age 10 or older        Passed - Patient has documented A1c within the specified period of time.     If HgbA1C is 8 or greater, it needs to be on file within the past 3 months.  If less than 8, must be on file within the past 6 months.     Recent Labs   Lab Test 02/19/20  0741   A1C 7.7*             Passed - Patient's CR is NOT>1.4 OR Patient's EGFR is NOT<45 within past 12 mos.     Recent Labs   Lab Test 02/28/20  1318   GFRESTIMATED 46*   GFRESTBLACK 54*       Recent Labs   Lab Test 02/28/20  1318   CR 1.45*             Passed - Patient does NOT have a diagnosis of CHF.        Passed - Medication is active on med list        Passed - Recent (6 mo) or future (30 days) visit within the authorizing provider's specialty     Patient had office visit in the last 6 months or has a visit in the next 30 days with authorizing provider or within the authorizing provider's specialty.  See \"Patient Info\" tab in inbasket, or \"Choose Columns\" in Meds & Orders section of the refill encounter.                   "

## 2020-03-18 NOTE — PROGRESS NOTES
"Lance Ibrahim is a 74 year old male who is being evaluated via a billable telephone visit.      The patient has been notified of following:     \"This telephone visit will be conducted via a call between you and your physician/provider. We have found that certain health care needs can be provided without the need for a physical exam.  This service lets us provide the care you need with a short phone conversation.  If a prescription is necessary we can send it directly to your pharmacy.  If lab work is needed we can place an order for that and you can then stop by our lab to have the test done at a later time.    If during the course of the call the physician/provider feels a telephone visit is not appropriate, you will not be charged for this service.\"       Lance Ibrahim complains of    Chief Complaint   Patient presents with     Follow Up     review test results        I have reviewed and updated the patient's Past Medical History, Social History, Family History and Medication List.    ALLERGIES  Penicillins; Sulfa drugs; and Ancef [cefazolin]     ROS:  Patient states ISBELL upon going up the stairs. Stared about a month ago.  Patient states that he wears compression socks.  Patient denies palpitations, chest pain, fatigue and lightheadedness.      Anat Parks CMA (MA signature)      Additional provider notes:   Last seen 2/28/20 by myself.    Please see that note for details.     Briefly, Lance Ibrahim is a 74 year old man with history of mild cardiomyopathy, CAD, HTN, hyperlipidemia, sleep apnea (not on CPAP), obesity, DM2, TIA.      Last echo 10/2019 LVEF 50-55%.     Stopped torsemide in Jan 2020.     2/21/20 ED visit for shoulder pain.  CXR showed new moderate CHF.     2/24/20 He called noting worsened ISBELL. Restarted torsemide at 20 mg.      I saw him 2/28/20.   Weight was 257 lbs.  Some improvement in ISBELL, but still ISBELL.  I continued torsemide 20 mg.     Echo 3/16/20: LVEF 50-55%, grade 1 diastolic dysfunction, " RV ok, IVC dilated.     Per telephone encounter today.   He is on torsemide 20 mg.   His weight is about 250 lbs.  He still has ISBELL, but again there is some improvement.   No chest pain.       Assessment/Plan:  H/o mild cardiomyopathy  - Echo 10/2019: LVEF 50%-55%.  There was borderline right ventricular enlargement with mildly elevated RVSP consistent with mild pulmonary hypertension.  The reader mentions that compared to his last echocardiogram, his EF had improved slightly and that the RVSP now was mildly increased.    The mildly elevated RVSP and RV dilatation likely due to untreated sleep apnea and obesity.  - Was on torsemide 20 mg, but self-stopped this in Jan 2020  - CXR 2/21/20 done for shoulder pain, showed CHF  - Called 2/24/20 with increased ISBELL.  Torsemide resumed at 20 mg  - Echo 3/16/20: LVEF unchanged at 50-55%, grade 1 diastolic dysfunction, RV ok, IVC dilated  - Still some ISBELL. Weight is 250 lbs.  He will increase torsemide to 40 mg x 2 days then resume 20 mg daily.  We discussed that he should remain on torsemide 20 mg and not self-stop it.   If he develops worsening ISBELL despite being on the torsemide, or new chest pain, he should call us.        CAD  - Continue ASA, statin  - No clear anginal pain  - Last ischemic eval 2/2019        HTN  - BP controlled on amlodipine 5 mg, labetalol 200 mg BID, Imdur 30 mg, torsemide 20 mg        Hyperlipidemia  - 6/2019 FLP: , HDL 63, LDL 43, TG 65  - Continue atorvastatin 40 mg        Sleep apnea  - CPAP recommended  - Weight loss        Oesity  - Weight loss        DM2        TIA          Follow up:  Telephone call in 1 month.  If still ISBELL despite diuresis, may need to consider ischemic evaluation        I have reviewed the note as documented above.  This accurately captures the substance of my conversation with the patient.      Phone call contact time  Call Started at 1543  Call Ended at 1549    Stanley Ortega PA-C

## 2020-03-18 NOTE — TELEPHONE ENCOUNTER
"Requested Prescriptions   Pending Prescriptions Disp Refills     amLODIPine (NORVASC) 5 MG tablet [Pharmacy Med Name: AMLODIPINE BESYLATE 5 MG TAB] 90 tablet 3     Sig: TAKE 1 TABLET BY MOUTH EVERY DAY   Last Written Prescription Date:  03/25/2019  Last Fill Quantity: 90,  # refills: 03   Last office visit: 2/19/2020 with prescribing provider:     Future Office Visit:   Next 5 appointments (look out 90 days)    Mar 18, 2020  8:45 AM CDT  Return Visit with Ayaka Azevedo DPM, Podiatry/Foot and Ankle Surgery  [facility] ([facility]) [address] [address] [address] [phone]   Mar 18, 2020  3:50 PM CDT  Telephone Visit with Stanley Ortega PA-C  [facility] ([facility]) [address]  [facility] [id number] [phone]           Calcium Channel Blockers Protocol  Failed - 3/18/2020  1:46 AM        Failed - Normal serum creatinine on file in past 12 months     Recent Labs   Lab Test 02/28/20  1318   CR 1.45*       Ok to refill medication if creatinine is low          Passed - Blood pressure under 140/90 in past 12 months     BP Readings from Last 3 Encounters:   03/13/20 126/67   02/28/20 103/61   02/21/20 (!) 155/72                 Passed - Recent (12 mo) or future (30 days) visit within the authorizing provider's specialty     Patient has had an office visit with the authorizing provider or a provider within the authorizing providers department within the previous 12 mos or has a future within next 30 days. See \"Patient Info\" tab in inbasket, or \"Choose Columns\" in Meds & Orders section of the refill encounter.              Passed - Medication is active on med list        Passed - Patient is age 18 or older           torsemide (DEMADEX) 20 MG tablet [Pharmacy Med Name: TORSEMIDE 20 MG TABLET] 90 tablet 3     Sig: TAKE 1 TABLET BY MOUTH EVERY DAY   Last Written Prescription Date:  02/24/2020  Last Fill " "Quantity: 90,  # refills: 03   Last office visit: 2/19/2020 with prescribing provider:     Future Office Visit:   Next 5 appointments (look out 90 days)    Mar 18, 2020  8:45 AM CDT  Return Visit with Ayaka Azevedo DPM, Podiatry/Foot and Ankle Surgery  [facility] ([facility]) [address] [address] [address] [phone]   Mar 18, 2020  3:50 PM CDT  Telephone Visit with Stanley Ortega PA-C  [facility] ([facility]) [address]  [facility] [address] [phone]           Diuretics (Including Combos) Protocol Failed - 3/18/2020  1:46 AM        Failed - Normal serum creatinine on file in past 12 months     Recent Labs   Lab Test 02/28/20  1318   CR 1.45*              Passed - Blood pressure under 140/90 in past 12 months     BP Readings from Last 3 Encounters:   03/13/20 126/67   02/28/20 103/61   02/21/20 (!) 155/72                 Passed - Recent (12 mo) or future (30 days) visit within the authorizing provider's specialty     Patient has had an office visit with the authorizing provider or a provider within the authorizing providers department within the previous 12 mos or has a future within next 30 days. See \"Patient Info\" tab in inbasket, or \"Choose Columns\" in Meds & Orders section of the refill encounter.              Passed - Medication is active on med list        Passed - Patient is age 18 or older        Passed - Normal serum potassium on file in past 12 months     Recent Labs   Lab Test 02/28/20  1318   POTASSIUM 4.3                    Passed - Normal serum sodium on file in past 12 months     Recent Labs   Lab Test 02/28/20  1318                    labetalol (NORMODYNE) 200 MG tablet [Pharmacy Med Name: LABETALOL  MG TABLET] 180 tablet 3     Sig: TAKE 1 TABLET (200 MG) BY MOUTH 2 TIMES DAILY   Last Written Prescription Date:  03/25/2019  Last Fill Quantity: 180,  # refills: 03 " "  Last office visit: 2/19/2020 with prescribing provider:     Future Office Visit:   Next 5 appointments (look out 90 days)    Mar 18, 2020  8:45 AM CDT  Return Visit with Ayaka Azevedo DPM, Podiatry/Foot and Ankle Surgery  [facility] ([facility]) [address] [facility] [address] [phone]   Mar 18, 2020  3:50 PM CDT  Telephone Visit with Stanley Ortega PA-C  [facility] ([facility]) [address]  [facility] [address] [phone]           Beta-Blockers Protocol Passed - 3/18/2020  1:46 AM        Passed - Blood pressure under 140/90 in past 12 months     BP Readings from Last 3 Encounters:   03/13/20 126/67   02/28/20 103/61   02/21/20 (!) 155/72                 Passed - Patient is age 6 or older        Passed - Recent (12 mo) or future (30 days) visit within the authorizing provider's specialty     Patient has had an office visit with the authorizing provider or a provider within the authorizing providers department within the previous 12 mos or has a future within next 30 days. See \"Patient Info\" tab in inbasket, or \"Choose Columns\" in Meds & Orders section of the refill encounter.              Passed - Medication is active on med list           "

## 2020-03-22 DIAGNOSIS — E11.9 TYPE 2 DIABETES MELLITUS WITHOUT COMPLICATION, WITHOUT LONG-TERM CURRENT USE OF INSULIN (H): ICD-10-CM

## 2020-03-22 RX ORDER — BLOOD SUGAR DIAGNOSTIC
STRIP MISCELLANEOUS
Qty: 100 STRIP | Refills: 3 | Status: SHIPPED | OUTPATIENT
Start: 2020-03-22 | End: 2020-09-01

## 2020-03-23 NOTE — TELEPHONE ENCOUNTER
Incorrect RX was sent again to the pharmacy.  Please see below.  Patient needs correct TX to  at pharmacy by 4:00 PM today.

## 2020-04-20 ENCOUNTER — VIRTUAL VISIT (OUTPATIENT)
Dept: CARDIOLOGY | Facility: CLINIC | Age: 75
End: 2020-04-20
Attending: PHYSICIAN ASSISTANT
Payer: COMMERCIAL

## 2020-04-20 DIAGNOSIS — I42.9 CARDIOMYOPATHY, UNSPECIFIED TYPE (H): ICD-10-CM

## 2020-04-20 DIAGNOSIS — R06.02 SHORTNESS OF BREATH: ICD-10-CM

## 2020-04-20 PROCEDURE — 99213 OFFICE O/P EST LOW 20 MIN: CPT | Mod: 95 | Performed by: NURSE PRACTITIONER

## 2020-04-20 NOTE — PROGRESS NOTES
"TELEPHONE VISIT    Lance Ibrahim is a 75 year old male who is being evaluated via a billable telephone visit.      The patient has been notified of following:     \"This telephone visit will be conducted via a call between you and your physician/provider. Given concern for spread of COVID 19 we are minimizing in person clinic visits when possible. We have found that certain health care needs can be provided without the need for a physical exam.  This service lets us provide the care you need with a short phone conversation.  If a prescription is necessary we can send it directly to your pharmacy.  If lab work is needed we can place an order for that and you can then stop by our lab to have the test done at a later time. If during the course of the call the physician/provider feels a telephone visit is not appropriate, you will not be charged for this service.\"       I have reviewed and updated the patient's Past Medical History, Social History, Family History and Medication List.    MEDICATIONS:  Current Outpatient Medications   Medication Sig Dispense Refill     amLODIPine (NORVASC) 5 MG tablet TAKE 1 TABLET BY MOUTH EVERY DAY 90 tablet 3     aspirin (ASA) 81 MG EC tablet Take 1 tablet (81 mg) by mouth daily 100 tablet 3     atorvastatin 40 MG PO tablet Take 1 tablet (40 mg) by mouth daily 90 tablet 3     blood glucose (ACCU-CHEK SMARTVIEW) test strip USE TO TEST BLOOD SUGAR 1 TIMES DAILY OR AS DIRECTED. 100 strip 3     blood glucose monitoring (ACCU-CHEK FASTCLIX) lancets USE LANCETS 2 TIMES DAILY, AS DIRECTED. 200 each 1     Ferrous Gluconate 240 (27 Fe) MG TABS Take 1 tablet by mouth daily        finasteride (PROSCAR) 5 MG tablet Take 5 mg by mouth daily.       fluticasone (FLONASE) 50 MCG/ACT spray Spray 1 spray into both nostrils nightly as needed        isosorbide mononitrate 30 MG PO 24 hr tablet Take 1 tablet (30 mg) by mouth daily 90 tablet 3     labetalol (NORMODYNE) 200 MG tablet TAKE 1 TABLET (200 MG) BY " MOUTH 2 TIMES DAILY 180 tablet 3     lisinopril 10 MG PO tablet Take 1 tablet (10 mg) by mouth daily 90 tablet 1     loratadine (CLARITIN) 10 MG tablet Take 10 mg by mouth as needed        metFORMIN (GLUCOPHAGE) 1000 MG tablet Take 1 tablet (1,000 mg) by mouth 2 times daily (with meals) 180 tablet 3     metFORMIN (GLUCOPHAGE) 1000 MG tablet Take 0.5 tablets (500 mg) by mouth 2 times daily (with meals) (Patient taking differently: Take 2,000 mg by mouth 2 times daily (with meals) ) 90 tablet 1     Multiple Vitamins-Minerals (MULTIVITAMIN ADULTS PO) Take 1 tablet by mouth daily        order for DME Diabetic shoes and multidensity inserts with offloading under right interphalangeal joint. (Patient not taking: Reported on 3/18/2020) 3 Device 0     order for DME [employer] Fax Number:  [phone]      1. Kellen - 10 individual packages (Patient not taking: Reported on 3/18/2020) 10 Package 3     silver sulfADIAZINE (SILVADENE) 1 % external cream Apply topically daily 25 g 1     tamsulosin (FLOMAX) 0.4 MG 24 hr capsule Take 1 capsule by mouth daily.       torsemide (DEMADEX) 20 MG tablet TAKE 1 TABLET BY MOUTH EVERY DAY 90 tablet 3       ALLERGIES  Penicillins; Sulfa drugs; and Ancef [cefazolin]    Patient reported vitals:  BP: 107/67  Heart rate: 76  Weight: 250lb       Review Of Systems  Skin: negative  Eyes: glasses  Ears/Nose/Throat: negative  Respiratory: Dyspnea on exertion-  Sleep apnea  Cardiovascular: negative  Gastrointestinal: negative  Genitourinary: negative  Musculoskeletal: negative  Neurologic: negative  Psychiatric: negative  Hematologic/Lymphatic/Immunologic: negative  Endocrine: diabetes  (ROS TAKEN BY Yaima Ibrahim CMA   PRIOR TO VISIT)    Patient agreeable and consented for telephone visit:  Yes    HISTORY OF PRESENT ILLNESS  This is a 75 year old male who follows with [facility].  He is a patient of Dr. Martines seen in our clinic for CAD, cardiomyopathy, HTN,  hyperlipidemia, untreated BRIANNA, type II DM, CKD, obesity, and TIA    Lance was first followed in our clinic after an episode of CHF (10/2018) while hospitalized for sepsis.  At that time, he was noted to have significant HTN and elevated LVEDP.  An ECHO showed LVEF 45-50%, grade I LVED, and mild RV enlargement with normal RV function.  He underwent coronary angiography revealing moderate diffuse CAD and an occluded apical LAD. We have aggressively treated his BP since.     A follow up ECHO one year later (10/2019) showed improved LVEF 50-55% and again mild RV enlargement, mild PHTN, and no significant valvular pathology.  He was taking diuretics at that time    2 months ago, Lance was seen in the ED for worsening SOB.  CXR showed evidence of HF.  He admitted to having self-stopped his Torsemide and this was restarted. An ECHO was repeated (3/16/20) which showed similar findings (LVEF 55%, grade I LVD)  Last month, his Torsemide was briefly increased due to ongoing ISBELL    Our telephone visit today is for further review    Lance denies any CP. He continues to have mild ISBELL but this has been unchanged for > 2 yrs.  He has no palpitations, significant lightheadedness, or orthopnea.    Lance states that when he has taken 40 mg of Torsemide, he suffered from significant nocturnal urination.  For the past month, he is taking 30 mg of Torsemide  He has not seen any improvement in his leg swelling. He feels that his calves are tight.  He is wearing compression socks.  Lance is a primary caregiver for his wife and is still working remotely for [employer]. He does not participate in any formal exercise but uses the stairs at home many times during the day                  ASSESSMENT AND PLAN    Chronic diastolic HF / Mild non-ischemic CM  -LVEF 50-55%, grade I LVD (3/2020) at time of HF exacerbation after self-stopping Torsemide (due to urinary incontinence)  -minimal improvement in DELFINO and ISBELL with Torsemide  -low salt diet /  daily weight/ compression socks  -chronic ISBELL likely multifactorial:  Obesity, deconditioning, diastolic dysfunction, and untreated BRIANNA  -he has agreed to alternate Torsemide 30mg with 40mg qod  -encouraged leg elevation    CAD:  -known occluded apical LAD and moderate diffuse multivessel CAD per cath (10/2018)  -NUC (2/2019) mild inferior and basal anterior defects felt to be attenuation artifact  -denies CP      HTN:  -On Amlodipine 5 mg / Labetalol 200mg / Lisinopril 10mg / Imdur 30 mg / Torsemide 20 mg  - mmHg at home    Hyperlipidemia:  -On Atorvastatin 40 mg  LDL 43 (6/2019)    Stage III CKD:  -recent Creatinine 1.45 (baseline)    Untreated BRIANNA  -resistant to CPAP  -offered re-evaluation with sleep MD      Thank you for allowing me to participate in his care.  He will return for follow up in 2 months with lipids/bmp and office visit with Dr. Martines      I have reviewed the note as documented above.  This accurately captures the substance of my conversation with the patient.      Phone call contact time  Call Started at 13:50 pm  Call Ended at 14:06 pm    RANJAN Wilks, CNP

## 2020-06-22 ENCOUNTER — MYC REFILL (OUTPATIENT)
Dept: PODIATRY | Facility: CLINIC | Age: 75
End: 2020-06-22

## 2020-06-22 DIAGNOSIS — L97.512 DIABETIC ULCER OF OTHER PART OF RIGHT FOOT ASSOCIATED WITH TYPE 2 DIABETES MELLITUS, WITH FAT LAYER EXPOSED (H): ICD-10-CM

## 2020-06-22 DIAGNOSIS — E11.42 DIABETIC POLYNEUROPATHY ASSOCIATED WITH TYPE 2 DIABETES MELLITUS (H): ICD-10-CM

## 2020-06-22 DIAGNOSIS — E11.621 DIABETIC ULCER OF OTHER PART OF RIGHT FOOT ASSOCIATED WITH TYPE 2 DIABETES MELLITUS, WITH FAT LAYER EXPOSED (H): ICD-10-CM

## 2020-06-23 NOTE — TELEPHONE ENCOUNTER
Order faxed to [facility] at [phone].    Patient was informed via MyChart.    Michelle WHYTE CMA

## 2020-07-15 ENCOUNTER — TRANSFERRED RECORDS (OUTPATIENT)
Dept: HEALTH INFORMATION MANAGEMENT | Facility: CLINIC | Age: 75
End: 2020-07-15

## 2020-07-27 ENCOUNTER — APPOINTMENT (OUTPATIENT)
Dept: GENERAL RADIOLOGY | Facility: CLINIC | Age: 75
DRG: 623 | End: 2020-07-27
Attending: PHYSICIAN ASSISTANT
Payer: COMMERCIAL

## 2020-07-27 ENCOUNTER — APPOINTMENT (OUTPATIENT)
Dept: MRI IMAGING | Facility: CLINIC | Age: 75
DRG: 623 | End: 2020-07-27
Attending: NURSE PRACTITIONER
Payer: COMMERCIAL

## 2020-07-27 ENCOUNTER — NURSE TRIAGE (OUTPATIENT)
Dept: NURSING | Facility: CLINIC | Age: 75
End: 2020-07-27

## 2020-07-27 ENCOUNTER — HOSPITAL ENCOUNTER (OUTPATIENT)
Facility: CLINIC | Age: 75
Setting detail: OBSERVATION
Discharge: HOME | DRG: 623 | End: 2020-07-28
Attending: PHYSICIAN ASSISTANT | Admitting: PODIATRIST
Payer: COMMERCIAL

## 2020-07-27 DIAGNOSIS — L03.032 CELLULITIS OF LEFT TOE: ICD-10-CM

## 2020-07-27 DIAGNOSIS — L97.529 DIABETIC ULCER OF TOE OF LEFT FOOT ASSOCIATED WITH TYPE 2 DIABETES MELLITUS, UNSPECIFIED ULCER STAGE (H): ICD-10-CM

## 2020-07-27 DIAGNOSIS — E11.621 DIABETIC ULCER OF TOE OF LEFT FOOT ASSOCIATED WITH TYPE 2 DIABETES MELLITUS, UNSPECIFIED ULCER STAGE (H): ICD-10-CM

## 2020-07-27 PROBLEM — L97.509: Status: ACTIVE | Noted: 2020-07-27

## 2020-07-27 LAB
ANION GAP SERPL CALCULATED.3IONS-SCNC: 8 MMOL/L (ref 3–14)
BASOPHILS # BLD AUTO: 0 10E9/L (ref 0–0.2)
BASOPHILS NFR BLD AUTO: 0.3 %
BUN SERPL-MCNC: 26 MG/DL (ref 7–30)
CALCIUM SERPL-MCNC: 8.9 MG/DL (ref 8.5–10.1)
CHLORIDE SERPL-SCNC: 104 MMOL/L (ref 94–109)
CO2 SERPL-SCNC: 25 MMOL/L (ref 20–32)
CREAT SERPL-MCNC: 1.3 MG/DL (ref 0.66–1.25)
DIFFERENTIAL METHOD BLD: ABNORMAL
EOSINOPHIL # BLD AUTO: 0.2 10E9/L (ref 0–0.7)
EOSINOPHIL NFR BLD AUTO: 1.8 %
ERYTHROCYTE [DISTWIDTH] IN BLOOD BY AUTOMATED COUNT: 17.6 % (ref 10–15)
GFR SERPL CREATININE-BSD FRML MDRD: 53 ML/MIN/{1.73_M2}
GLUCOSE BLDC GLUCOMTR-MCNC: 146 MG/DL (ref 70–99)
GLUCOSE SERPL-MCNC: 127 MG/DL (ref 70–99)
HBA1C MFR BLD: 7.1 % (ref 0–5.6)
HCT VFR BLD AUTO: 38.4 % (ref 40–53)
HGB BLD-MCNC: 11.7 G/DL (ref 13.3–17.7)
IMM GRANULOCYTES # BLD: 0.1 10E9/L (ref 0–0.4)
IMM GRANULOCYTES NFR BLD: 0.9 %
LACTATE BLD-SCNC: 2.1 MMOL/L (ref 0.7–2)
LACTATE BLD-SCNC: 2.2 MMOL/L (ref 0.7–2)
LYMPHOCYTES # BLD AUTO: 2.4 10E9/L (ref 0.8–5.3)
LYMPHOCYTES NFR BLD AUTO: 22.6 %
MCH RBC QN AUTO: 28.1 PG (ref 26.5–33)
MCHC RBC AUTO-ENTMCNC: 30.5 G/DL (ref 31.5–36.5)
MCV RBC AUTO: 92 FL (ref 78–100)
MONOCYTES # BLD AUTO: 1 10E9/L (ref 0–1.3)
MONOCYTES NFR BLD AUTO: 9.8 %
NEUTROPHILS # BLD AUTO: 6.7 10E9/L (ref 1.6–8.3)
NEUTROPHILS NFR BLD AUTO: 64.6 %
NRBC # BLD AUTO: 0 10*3/UL
NRBC BLD AUTO-RTO: 0 /100
PLATELET # BLD AUTO: 247 10E9/L (ref 150–450)
POTASSIUM SERPL-SCNC: 3.9 MMOL/L (ref 3.4–5.3)
RBC # BLD AUTO: 4.16 10E12/L (ref 4.4–5.9)
SODIUM SERPL-SCNC: 137 MMOL/L (ref 133–144)
WBC # BLD AUTO: 10.4 10E9/L (ref 4–11)

## 2020-07-27 PROCEDURE — 12000000 ZZH R&B MED SURG/OB

## 2020-07-27 PROCEDURE — 96375 TX/PRO/DX INJ NEW DRUG ADDON: CPT

## 2020-07-27 PROCEDURE — 83036 HEMOGLOBIN GLYCOSYLATED A1C: CPT | Performed by: PHYSICIAN ASSISTANT

## 2020-07-27 PROCEDURE — G0378 HOSPITAL OBSERVATION PER HR: HCPCS

## 2020-07-27 PROCEDURE — U0003 INFECTIOUS AGENT DETECTION BY NUCLEIC ACID (DNA OR RNA); SEVERE ACUTE RESPIRATORY SYNDROME CORONAVIRUS 2 (SARS-COV-2) (CORONAVIRUS DISEASE [COVID-19]), AMPLIFIED PROBE TECHNIQUE, MAKING USE OF HIGH THROUGHPUT TECHNOLOGIES AS DESCRIBED BY CMS-2020-01-R: HCPCS | Performed by: PHYSICIAN ASSISTANT

## 2020-07-27 PROCEDURE — C9803 HOPD COVID-19 SPEC COLLECT: HCPCS

## 2020-07-27 PROCEDURE — 83605 ASSAY OF LACTIC ACID: CPT | Mod: 91 | Performed by: PHYSICIAN ASSISTANT

## 2020-07-27 PROCEDURE — 73630 X-RAY EXAM OF FOOT: CPT | Mod: LT

## 2020-07-27 PROCEDURE — 25000132 ZZH RX MED GY IP 250 OP 250 PS 637: Performed by: NURSE PRACTITIONER

## 2020-07-27 PROCEDURE — 25800030 ZZH RX IP 258 OP 636: Performed by: PHYSICIAN ASSISTANT

## 2020-07-27 PROCEDURE — 85025 COMPLETE CBC W/AUTO DIFF WBC: CPT | Performed by: PHYSICIAN ASSISTANT

## 2020-07-27 PROCEDURE — 00000146 ZZHCL STATISTIC GLUCOSE BY METER IP

## 2020-07-27 PROCEDURE — 25000128 H RX IP 250 OP 636: Performed by: PHYSICIAN ASSISTANT

## 2020-07-27 PROCEDURE — 73718 MRI LOWER EXTREMITY W/O DYE: CPT | Mod: LT

## 2020-07-27 PROCEDURE — 96365 THER/PROPH/DIAG IV INF INIT: CPT

## 2020-07-27 PROCEDURE — 80048 BASIC METABOLIC PNL TOTAL CA: CPT | Performed by: PHYSICIAN ASSISTANT

## 2020-07-27 PROCEDURE — 87040 BLOOD CULTURE FOR BACTERIA: CPT | Performed by: PHYSICIAN ASSISTANT

## 2020-07-27 PROCEDURE — 25000125 ZZHC RX 250: Performed by: PHYSICIAN ASSISTANT

## 2020-07-27 PROCEDURE — 99285 EMERGENCY DEPT VISIT HI MDM: CPT | Mod: 25

## 2020-07-27 RX ORDER — ATORVASTATIN CALCIUM 40 MG/1
40 TABLET, FILM COATED ORAL DAILY
Status: DISCONTINUED | OUTPATIENT
Start: 2020-07-28 | End: 2020-07-28 | Stop reason: HOSPADM

## 2020-07-27 RX ORDER — ONDANSETRON 4 MG/1
4 TABLET, ORALLY DISINTEGRATING ORAL EVERY 6 HOURS PRN
Status: DISCONTINUED | OUTPATIENT
Start: 2020-07-27 | End: 2020-07-28 | Stop reason: HOSPADM

## 2020-07-27 RX ORDER — ISOSORBIDE MONONITRATE 30 MG/1
30 TABLET, EXTENDED RELEASE ORAL DAILY
Status: DISCONTINUED | OUTPATIENT
Start: 2020-07-28 | End: 2020-07-28 | Stop reason: HOSPADM

## 2020-07-27 RX ORDER — NALOXONE HYDROCHLORIDE 0.4 MG/ML
.1-.4 INJECTION, SOLUTION INTRAMUSCULAR; INTRAVENOUS; SUBCUTANEOUS
Status: DISCONTINUED | OUTPATIENT
Start: 2020-07-27 | End: 2020-07-28 | Stop reason: HOSPADM

## 2020-07-27 RX ORDER — DEXTROSE MONOHYDRATE 25 G/50ML
25-50 INJECTION, SOLUTION INTRAVENOUS
Status: DISCONTINUED | OUTPATIENT
Start: 2020-07-27 | End: 2020-07-28 | Stop reason: HOSPADM

## 2020-07-27 RX ORDER — AMOXICILLIN 250 MG
2 CAPSULE ORAL 2 TIMES DAILY
Status: DISCONTINUED | OUTPATIENT
Start: 2020-07-27 | End: 2020-07-28 | Stop reason: HOSPADM

## 2020-07-27 RX ORDER — CEFAZOLIN SODIUM 1 G/50ML
2000 SOLUTION INTRAVENOUS EVERY 12 HOURS
Status: DISCONTINUED | OUTPATIENT
Start: 2020-07-28 | End: 2020-07-28 | Stop reason: HOSPADM

## 2020-07-27 RX ORDER — TORSEMIDE 20 MG/1
20 TABLET ORAL DAILY
Status: DISCONTINUED | OUTPATIENT
Start: 2020-07-28 | End: 2020-07-28 | Stop reason: HOSPADM

## 2020-07-27 RX ORDER — CLINDAMYCIN PHOSPHATE 900 MG/50ML
900 INJECTION, SOLUTION INTRAVENOUS EVERY 8 HOURS
Status: DISCONTINUED | OUTPATIENT
Start: 2020-07-28 | End: 2020-07-28 | Stop reason: HOSPADM

## 2020-07-27 RX ORDER — CLINDAMYCIN PHOSPHATE 900 MG/50ML
900 INJECTION, SOLUTION INTRAVENOUS ONCE
Status: COMPLETED | OUTPATIENT
Start: 2020-07-27 | End: 2020-07-27

## 2020-07-27 RX ORDER — NICOTINE POLACRILEX 4 MG
15-30 LOZENGE BUCCAL
Status: DISCONTINUED | OUTPATIENT
Start: 2020-07-27 | End: 2020-07-28 | Stop reason: HOSPADM

## 2020-07-27 RX ORDER — LORATADINE 10 MG/1
10 TABLET ORAL
Status: DISCONTINUED | OUTPATIENT
Start: 2020-07-27 | End: 2020-07-28 | Stop reason: HOSPADM

## 2020-07-27 RX ORDER — FLUTICASONE PROPIONATE 50 MCG
1 SPRAY, SUSPENSION (ML) NASAL
Status: DISCONTINUED | OUTPATIENT
Start: 2020-07-27 | End: 2020-07-28 | Stop reason: HOSPADM

## 2020-07-27 RX ORDER — AMOXICILLIN 250 MG
1 CAPSULE ORAL 2 TIMES DAILY
Status: DISCONTINUED | OUTPATIENT
Start: 2020-07-27 | End: 2020-07-28 | Stop reason: HOSPADM

## 2020-07-27 RX ORDER — FERROUS GLUCONATE 324(38)MG
324 TABLET ORAL DAILY
Status: DISCONTINUED | OUTPATIENT
Start: 2020-07-28 | End: 2020-07-28 | Stop reason: HOSPADM

## 2020-07-27 RX ORDER — AMLODIPINE BESYLATE 5 MG/1
5 TABLET ORAL DAILY
Status: DISCONTINUED | OUTPATIENT
Start: 2020-07-28 | End: 2020-07-28 | Stop reason: HOSPADM

## 2020-07-27 RX ORDER — LIDOCAINE 40 MG/G
CREAM TOPICAL
Status: DISCONTINUED | OUTPATIENT
Start: 2020-07-27 | End: 2020-07-28 | Stop reason: HOSPADM

## 2020-07-27 RX ORDER — ONDANSETRON 2 MG/ML
4 INJECTION INTRAMUSCULAR; INTRAVENOUS EVERY 6 HOURS PRN
Status: DISCONTINUED | OUTPATIENT
Start: 2020-07-27 | End: 2020-07-28 | Stop reason: HOSPADM

## 2020-07-27 RX ORDER — LABETALOL 100 MG/1
200 TABLET, FILM COATED ORAL 2 TIMES DAILY
Status: DISCONTINUED | OUTPATIENT
Start: 2020-07-27 | End: 2020-07-28 | Stop reason: HOSPADM

## 2020-07-27 RX ORDER — TAMSULOSIN HYDROCHLORIDE 0.4 MG/1
0.4 CAPSULE ORAL DAILY
Status: DISCONTINUED | OUTPATIENT
Start: 2020-07-28 | End: 2020-07-28 | Stop reason: HOSPADM

## 2020-07-27 RX ORDER — FINASTERIDE 5 MG/1
5 TABLET, FILM COATED ORAL DAILY
Status: DISCONTINUED | OUTPATIENT
Start: 2020-07-28 | End: 2020-07-28 | Stop reason: HOSPADM

## 2020-07-27 RX ADMIN — CLINDAMYCIN PHOSPHATE 900 MG: 900 INJECTION, SOLUTION INTRAVENOUS at 18:27

## 2020-07-27 RX ADMIN — VANCOMYCIN HYDROCHLORIDE 2500 MG: 5 INJECTION, POWDER, LYOPHILIZED, FOR SOLUTION INTRAVENOUS at 19:27

## 2020-07-27 RX ADMIN — FLUTICASONE PROPIONATE 1 SPRAY: 50 SPRAY, METERED NASAL at 22:26

## 2020-07-27 RX ADMIN — LORATADINE 10 MG: 10 TABLET ORAL at 22:26

## 2020-07-27 RX ADMIN — SODIUM CHLORIDE 1000 ML: 9 INJECTION, SOLUTION INTRAVENOUS at 17:59

## 2020-07-27 RX ADMIN — LABETALOL HYDROCHLORIDE 200 MG: 100 TABLET, FILM COATED ORAL at 22:25

## 2020-07-27 ASSESSMENT — ACTIVITIES OF DAILY LIVING (ADL)
TRANSFERRING: 0-->INDEPENDENT
DRESS: 0-->INDEPENDENT
TOILETING: 0-->INDEPENDENT
AMBULATION: 0-->INDEPENDENT
COGNITION: 0 - NO COGNITION ISSUES REPORTED
BATHING: 0-->INDEPENDENT
SWALLOWING: 0-->SWALLOWS FOODS/LIQUIDS WITHOUT DIFFICULTY
RETIRED_COMMUNICATION: 0-->UNDERSTANDS/COMMUNICATES WITHOUT DIFFICULTY
FALL_HISTORY_WITHIN_LAST_SIX_MONTHS: NO
RETIRED_EATING: 0-->INDEPENDENT

## 2020-07-27 ASSESSMENT — ENCOUNTER SYMPTOMS
FEVER: 0
CHILLS: 0
SHORTNESS OF BREATH: 0
WOUND: 1
COLOR CHANGE: 1

## 2020-07-27 ASSESSMENT — MIFFLIN-ST. JEOR: SCORE: 1929.66

## 2020-07-27 NOTE — H&P
[facility]    History and Physical  Hospitalist       Date of Admission:  7/27/2020    Assessment & Plan   Lance Ibrahim is a 75 year old male who present to the ED with LLE foot ulcer    Left 5th toe diabetic ulcer/LLE cellulitis: Noticed pain 5 days ago, red 2 days ago, hx of osteo, staph infection last year. Afebrile, no leukocytosis. Lactate at 2.2, 1L NaCl provided and recheck 2.1   -MRI on floor tonight, if osteo plan for OR in am with podiatry (NPO after MN ordered, ACE-I, Metformin, ASA am doses on hold- keeping diuretic for am however given IVF received and IVF likely in the OR). If no osteo plan for a bed side debridement  -Continue Vanco and Clinda for now, appreciate pharm dosing vanco  -Elevate  -Ok to be open to air for now   -APAP PRN   -CBC and BMP in am     Chronic RLE ulcer: Stable.   -Follow up as previously planned with podiatry     HX of TIA: no residual effects per pt  -Hold ASA in am in case of OR  -PTA statin     DM II: A1C 7.7 in Feb 2020. Per pt -145 daily  -Hold metformin  -FSG with ssi in place   -Cons carb diet     LAURA: Chronic and stable  -PTA iron sup     BPH: Chronic and stable  -PTA proscar and flomax    CHF/HTN/CAD: WT stable base 250-254. Uses compression stockings daily at baseline. No s/s of exacerbation. Last Echo with EF of 50-55% 3/2020   -PTA imdur, labetalol, demadex  -Holding lisinopril and ASA in am in case of OR  -Judicious use of IVF  -Low Na diet     BRIANNA: Not using CPAP  -Perioperative hypoventilation risk     CKD 3: creat at baseline 1.30  -Holding ACE-I, Metformin in case of OR, ok for am diuretic dose.   -BMP in am     Hospital Medicine Preoperative Consult Note      Cardiac Review of Systems: Does patient currently have any of following conditions? NO    #  Acute coronary syndrome  #  MI < one month ago  #  decompensated heart failure  #  Significant arrhythmias   #  Severe valvular disease If yes, patient should not proceed for surgery until  cardiac condition is stabilized. Preoperative evaluation should end here.        Pulmonary review of system:  1. History of COPD? No   2. History of asthma? No    Activities of daily living assessment:  Patient can ambulate per self, adl per self.       Preoperative cardiac evaluation: Revised cardiac Risk index of 4. The surgery scheduled has low cardiac risk. Based on AHA guideline of 2009, he does not need further cardiac testing before surgery. Further cardiac testing has not been shown to reduce perioperative morbidity and mortality in a patient with this risk profile, and as reflected in the ACC guidelines, no cardiac further testing is warranted at this time. No specific postoperative surveillance is needed other than what is routine.       Risk of postoperative delirium:  Patient has medium risk of developing postoperative delirium based on patient's age, cognitive function and comorbidities. Initiation of vulnerable brain protocol is recommended for patient with moderate or high risk.     Risk of postoperative acute renal failure: Patient has high risk of developing postoperative acute renal failure. Prevention of intraoperative hypotension, discontinuation of angiotensin converting enzyme inhibitor before surgery and maintaining optimal postoperative volume status are advised for patient with intermediate or high risk.     Risk of postoperative hypoventilation: Patient's body mass index is 34.73 kg/m ..  Patient does have history of BRIANNA. The risk of postoperative hypoventilation is medium.      Level of Care: Gen care inpt  FEN: Cons carb diet, low Na  Lines/Catheters: PIV  DVT Prophylaxis: Mechanical for now, if prolonged stay start chemical   Code Status: Full    Dispo: Home pending progression       Expected discharge: 2 - 3 days, recommended to prior living arrangement once Further podiatry planning.    Kourtney Landaverde, LYNDON, GNP, APRN 7/27/2020  [facility] Provider  Pager: Admitting Pager (Obs Unit)  via [facility].     Primary Care Physician   Anh Spivey    Chief Complaint   Left foot pain.     History is obtained from the patient, ED staff, and chart.      History of Present Illness   Lance Ibrahim is a 75 year old male who  has a past medical history of Abnormal stress test (3/4/2019), Arthritis, CAD (coronary artery disease), Cardiomyopathy (H), Cerebral artery occlusion with cerebral infarction (H), Chest pain, Chronic rhinitis, ISBELL (dyspnea on exertion), Dyspnea (02/17/2019), Edema, HTN (hypertension), Hyperlipidemia, Hypertrophy of prostate with urinary obstruction and other lower urinary tract symptoms (LUTS), Nephrolithiasis, Orthopnea, PVD (peripheral vascular disease) (H), Renal colic, S/P cardiac cath (03/05/2019), Sleep apnea, TIA (transient ischaemic attack) (1993), Type 2 diabetes mellitus with peripheral vascular disease (H) (11/8/2018), Unspecified transient cerebral ischemia (1993), and Ureteral stone.  who presented to the ED with pain in his left foot. He noticed the pain 5 days ago, 2 days ago it started to become red and today he cant tolerate any pressure at all on the toe. He did make a PCP appt for tomorrow to see if he could get a post op show to relieve the pressure but decided to come to ED. He also has a new rash moving anteriorly up his shin and a bit laterally on the left side a well. He has some neuropathy but does feel the pain in his leg. He usually has -145 however had a few days 170-180, today back to 130. He has no fever, chills, cough, sob, chest pain, le edema is at baseline. His wt has been 250-254 very stable for 6 months now. BP has been stable. He has no change in his bowels or bladder. He functions independently at home. He has had toe amp, shoulder screws, and abd mesh placed. Per his report he has had no trouble with anesthesia prior. He does have BRIANNA and does not use cpap. He does not smoke any more. He does have some mild sob with exertion but that is  his baseline, nothing new. Last ASA was 81mg and this am 7/27.     While in the ED podiatry was consulted and plan for MRI today to eval further for osteo. He was started on vanco and clinda and admitted to the floor in stable condition. He is in good spirits and aside from the pain in his foot doing well this evening.     Past Medical History    I have reviewed this patient's medical history and updated it with pertinent information if needed.   Past Medical History:   Diagnosis Date     Abnormal stress test 3/4/2019    Added automatically from request for surgery [id number]     Arthritis     osteoarthritis knees     CAD (coronary artery disease)     subtotal occlusion in the small distal LAD      Cardiomyopathy (H)      Cerebral artery occlusion with cerebral infarction (H)     TIA 1993, no residual     Chest pain      Chronic rhinitis      ISBELL (dyspnea on exertion)      Dyspnea 02/17/2019     Edema      HTN (hypertension)      Hyperlipidemia      Hypertrophy of prostate with urinary obstruction and other lower urinary tract symptoms (LUTS)      Nephrolithiasis      Orthopnea      PVD (peripheral vascular disease) (H)      Renal colic      S/P cardiac cath 03/05/2019    subtotal occlusion in the small distal LAD      Sleep apnea     doesn't use cpap     TIA (transient ischaemic attack) 1993     Type 2 diabetes mellitus with peripheral vascular disease (H) 11/8/2018     Unspecified transient cerebral ischemia 1993    No definite source found     Ureteral stone        Past Surgical History   I have reviewed this patient's surgical history and updated it with pertinent information if needed.  Past Surgical History:   Procedure Laterality Date     AMPUTATE TOE(S) Left 10/15/2018    Procedure: AMPUTATE TOE(S);  Left third toe amputation;  Surgeon: Ayaka Azevedo DPM, Podiatry/Foot and Ankle Surgery;  Location: [facility] OR     ARTHROPLASTY KNEE Right 12/7/2018    Procedure: Right total knee arthroplasty;  Surgeon: Alphonse  Issa CASTILLO MD;  Location: [facility] OR     C NONSPECIFIC PROCEDURE  1985    Diastasis recti repair     C NONSPECIFIC PROCEDURE  1987    Ventral hernia repair     C NONSPECIFIC PROCEDURE      ORIF of left shoulder     COLONOSCOPY  3/9/2013    Procedure: COLONOSCOPY;  COLONOSCOPY;  Surgeon: Chau Hogan MD;  Location:  GI     CV HEART CATHETERIZATION WITH POSSIBLE INTERVENTION Left 3/5/2019    Procedure: Coronary Angiogram;  Surgeon: Nas Linda MD;  Location:  HEART CARDIAC CATH LAB     EYE SURGERY  03/13/2017    left eye cataract     EYE SURGERY      Right eye cataract     FOOT SURGERY  4/2013    cyst removal      HERNIA REPAIR  7/13/2004    ventral        Prior to Admission Medications   Prior to Admission Medications   Prescriptions Last Dose Informant Patient Reported? Taking?   Ferrous Gluconate 240 (27 Fe) MG TABS 7/27/2020 at 0600  Yes Yes   Sig: Take 1 tablet by mouth daily    Multiple Vitamins-Minerals (MULTIVITAMIN ADULTS PO) 7/27/2020 at 0600  Yes Yes   Sig: Take 1 tablet by mouth daily    amLODIPine (NORVASC) 5 MG tablet 7/27/2020 at 0600  No Yes   Sig: TAKE 1 TABLET BY MOUTH EVERY DAY   aspirin (ASA) 81 MG EC tablet 7/27/2020 at 0600  No Yes   Sig: Take 1 tablet (81 mg) by mouth daily   atorvastatin 40 MG PO tablet 7/27/2020 at 0600  No Yes   Sig: Take 1 tablet (40 mg) by mouth daily   blood glucose (ACCU-CHEK SMARTVIEW) test strip   No No   Sig: USE TO TEST BLOOD SUGAR 1 TIMES DAILY OR AS DIRECTED.   blood glucose monitoring (ACCU-CHEK FASTCLIX) lancets   No No   Sig: USE LANCETS 2 TIMES DAILY, AS DIRECTED.   finasteride (PROSCAR) 5 MG tablet 7/27/2020 at 0600  Yes Yes   Sig: Take 5 mg by mouth daily.   fluticasone (FLONASE) 50 MCG/ACT spray 7/26/2020 at pm  Yes Yes   Sig: Spray 1 spray into both nostrils nightly as needed    isosorbide mononitrate 30 MG PO 24 hr tablet 7/27/2020 at 0600  No Yes   Sig: Take 1 tablet (30 mg) by mouth daily   labetalol (NORMODYNE) 200 MG tablet 7/27/2020 at 0600   "No Yes   Sig: TAKE 1 TABLET (200 MG) BY MOUTH 2 TIMES DAILY   lisinopril 10 MG PO tablet 2020 at 0600  No Yes   Sig: Take 1 tablet (10 mg) by mouth daily   loratadine (CLARITIN) 10 MG tablet 2020 at pm  Yes Yes   Sig: Take 10 mg by mouth nightly as needed    metFORMIN (GLUCOPHAGE) 1000 MG tablet 2020 at 0600  No Yes   Sig: Take 1 tablet (1,000 mg) by mouth 2 times daily (with meals)   order for DME   No No   Sig: Diabetic shoes and multidensity inserts with offloading under right interphalangeal joint.   tamsulosin (FLOMAX) 0.4 MG 24 hr capsule 2020 at 0600  Yes Yes   Sig: Take 1 capsule by mouth daily.   torsemide (DEMADEX) 20 MG tablet 2020 at 0600  No Yes   Sig: TAKE 1 TABLET BY MOUTH EVERY DAY      Facility-Administered Medications: None     Allergies   Allergies   Allergen Reactions     Penicillins Anaphylaxis     \"anaphylactic\"     Sulfa Drugs      \"itchy rash, swelling of face and hands\"     Ancef [Cefazolin] Rash       Social History   I have reviewed this patient's social history and updated it with pertinent information if needed. Lance Ibrahim  reports that he quit smoking about 27 years ago. He smoked 0.00 packs per day. He has never used smokeless tobacco. He reports previous alcohol use. He reports that he does not use drugs.    Family History   I have reviewed this patient's family history and updated it with pertinent information if needed.   Family History   Problem Relation Age of Onset     Family History Negative Mother          86 yo     Cancer Father          74 yo brain     Diabetes Maternal Grandfather          89 yo     Alcohol/Drug Paternal Grandfather              Colon Cancer No family hx of        Review of Systems   The 10 point Review of Systems is negative other than noted in the HPI or here.     Physical Exam   Temp: 98.8  F (37.1  C) Temp src: Oral BP: (!) 157/80 Pulse: 89 Heart Rate: 93 Resp: 18 SpO2: 95 % O2 Device: None (Room " air)    Vital Signs with Ranges  Temp:  [98.2  F (36.8  C)-98.8  F (37.1  C)] 98.8  F (37.1  C)  Pulse:  [78-93] 89  Heart Rate:  [93] 93  Resp:  [18] 18  BP: (147-165)/(79-91) 157/80  SpO2:  [94 %-99 %] 95 %  256 lbs 1.6 oz      Constitutional: Awake, alert, cooperative, no apparent distress.  Eyes: Conjunctiva and pupils examined and normal.  HEENT: Moist mucous membranes, normal dentition.  Respiratory: Even unlabored respirations   Cardiovascular: Well perfused extremities, no le edema   GI: No distension   : no osman  Lymph/Hematologic: No anterior cervical or supraclavicular adenopathy.  Skin: left 5th toe blister, ulcer, not open. Erythema noted surrounding. Redness from dorsum of foot up distal 1/3 of tibia anteriorlaterally. No weeping. Venous changes  Musculoskeletal: No joint swelling, erythema noted on exposed skin  Psychiatric: Alert, oriented to person, place and time, no obvious anxiety or depression  Data     Data reviewed today:  I personally reviewed     Recent Results (from the past 24 hour(s))   Foot XR, G/E 3 views, left    Narrative    EXAM: XR FOOT LT G/E 3 VW  LOCATION: [facility]  DATE/TIME: 7/27/2020 5:51 PM    INDICATION: Left pinky toe ulcer and cellulitis  COMPARISON: None.      Impression    IMPRESSION: Limited evaluation due to patient positioning. Soft tissue swelling throughout the foot. No definite evidence of acute osteomyelitis. Status post amputation of the third toe at the level of the MTP joint. Moderate first MTP joint degenerative   changes. Otherwise mild degenerative changes throughout the foot. Small calcaneal enthesophytes.       Recent Labs   Lab 07/27/20  1734   WBC 10.4   HGB 11.7*   MCV 92         POTASSIUM 3.9   CHLORIDE 104   CO2 25   BUN 26   CR 1.30*   ANIONGAP 8   SHANNAN 8.9   *

## 2020-07-27 NOTE — ED PROVIDER NOTES
Emergency Department Attending Supervision Note  7/27/2020  6:32 PM      I evaluated this patient with NED Betancourt.       Briefly, the patient presented with left fourth toe with a blister and redness extending on the dorsum of his foot up his anterior shin.  He does have a history of diabetes and diabetic foot ulcers requiring amputation of his left fifth digit. He denies fever or chills.       On my exam,  ecchymotic ulcer on dorsum of left fourth digit with erythema extending on the dorsum of the foot and up the anterior leg.  No significant tenderness with range of motion though he is neuropathic.    Workup:  MR Foot Left w/o Contrast   Final Result   IMPRESSION:   1.  Moderate dorsal forefoot soft tissue edema is most concerning for cellulitis.   2.  The 2 cm fluid collection in the fifth toe dorsally may represent an abscess versus blister.   3.  Marrow edema involving the fourth distal phalanx is nonspecific and poorly evaluated due to patient positioning and motion artifact; osteomyelitis cannot be excluded.   4.  Other ancillary findings as described above.         Foot XR, G/E 3 views, left   Final Result   IMPRESSION: Limited evaluation due to patient positioning. Soft tissue swelling throughout the foot. No definite evidence of acute osteomyelitis. Status post amputation of the third toe at the level of the MTP joint. Moderate first MTP joint degenerative    changes. Otherwise mild degenerative changes throughout the foot. Small calcaneal enthesophytes.        Labs Ordered and Resulted from Time of ED Arrival Up to the Time of Departure from the ED   CBC WITH PLATELETS DIFFERENTIAL - Abnormal; Notable for the following components:       Result Value    RBC Count 4.16 (*)     Hemoglobin 11.7 (*)     Hematocrit 38.4 (*)     MCHC 30.5 (*)     RDW 17.6 (*)     All other components within normal limits   BASIC METABOLIC PANEL - Abnormal; Notable for the following components:    Glucose 127 (*)      Creatinine 1.30 (*)     GFR Estimate 53 (*)     All other components within normal limits   LACTIC ACID WHOLE BLOOD - Abnormal; Notable for the following components:    Lactic Acid 2.2 (*)     All other components within normal limits   LACTIC ACID WHOLE BLOOD - Abnormal; Notable for the following components:    Lactic Acid 2.1 (*)     All other components within normal limits   PERIPHERAL IV CATHETER     Medications   clindamycin (CLEOCIN) infusion 900 mg (has no administration in time range)   amLODIPine (NORVASC) tablet 5 mg (has no administration in time range)   atorvastatin (LIPITOR) tablet 40 mg (has no administration in time range)   ferrous gluconate (FERGON) tablet 324 mg (has no administration in time range)   finasteride (PROSCAR) tablet 5 mg (has no administration in time range)   fluticasone (FLONASE) 50 MCG/ACT spray 1 spray (1 spray Both Nostrils Given 7/27/20 2226)   isosorbide mononitrate (IMDUR) 24 hr tablet 30 mg (has no administration in time range)   labetalol (NORMODYNE) tablet 200 mg (200 mg Oral Given 7/27/20 2225)   loratadine (CLARITIN) tablet 10 mg (10 mg Oral Given 7/27/20 2226)   tamsulosin (FLOMAX) capsule 0.4 mg (has no administration in time range)   torsemide (DEMADEX) tablet 20 mg (has no administration in time range)   glucose gel 15-30 g (has no administration in time range)     Or   dextrose 50 % injection 25-50 mL (has no administration in time range)     Or   glucagon injection 1 mg (has no administration in time range)   insulin aspart (NovoLOG) injection (RAPID ACTING) (has no administration in time range)   insulin aspart (NovoLOG) injection (RAPID ACTING) (1 Units Subcutaneous Not Given 7/27/20 2231)   lidocaine 1 % 0.1-1 mL (has no administration in time range)   lidocaine (LMX4) cream (has no administration in time range)   sodium chloride (PF) 0.9% PF flush 3 mL (has no administration in time range)   sodium chloride (PF) 0.9% PF flush 3 mL (3 mLs Intracatheter Given  7/27/20 2236)   naloxone (NARCAN) injection 0.1-0.4 mg (has no administration in time range)   melatonin tablet 1 mg (has no administration in time range)   ondansetron (ZOFRAN-ODT) ODT tab 4 mg (has no administration in time range)     Or   ondansetron (ZOFRAN) injection 4 mg (has no administration in time range)   senna-docusate (SENOKOT-S/PERICOLACE) 8.6-50 MG per tablet 1 tablet (1 tablet Oral Not Given 7/27/20 2232)     Or   senna-docusate (SENOKOT-S/PERICOLACE) 8.6-50 MG per tablet 2 tablet ( Oral See Alternative 7/27/20 2232)   vancomycin (VANCOCIN) 2,000 mg in sodium chloride 0.9 % 500 mL intermittent infusion (has no administration in time range)   0.9% sodium chloride BOLUS (0 mLs Intravenous ED Infusing on Admission/transfer 7/27/20 1931)   clindamycin (CLEOCIN) infusion 900 mg (0 mg Intravenous Stopped 7/27/20 1925)   vancomycin (VANCOCIN) 2,500 mg in sodium chloride 0.9 % 500 mL intermittent infusion (0 mg Intravenous ED Infusing on Admission/transfer 7/27/20 1931)       In summary, my impression is infected diabetic foot ulcer with clear cellulitis.  X-rays negative for overt bony ulceration though given only several days of symptoms, this is less specific.  However no evidence of sepsis and will plan for nonemergent MRI after admission.  Was initiated on vancomycin and clindamycin due to his antibiotic allergies.  Will admit to medicine with plan for podiatric surgery consultation in the morning.    Visit Diagnosis, Associated Orders, and Comments     ICD-10-CM    1. Cellulitis of left toe  L03.032 \   2. Diabetic ulcer of toe of left foot associated with type 2 diabetes mellitus, unspecified ulcer stage (H)  E11.621 \    L97.529          Disposition:  Admit to medicine    Lance Delacruz MD  [facility]  [facility] Emergency Department    6:32 PM 07/27/20           Lance Delacruz MD  07/28/20 0126

## 2020-07-27 NOTE — ED NOTES
"[facility]  ED Nurse Handoff Report    Lance Ibrahim is a 75 year old male   ED Chief complaint: Toe Pain  . ED Diagnosis:   Final diagnoses:   Cellulitis of left toe   Diabetic ulcer of toe of left foot associated with type 2 diabetes mellitus, unspecified ulcer stage (H)     Allergies:   Allergies   Allergen Reactions     Penicillins Anaphylaxis     \"anaphylactic\"     Sulfa Drugs      \"itchy rash, swelling of face and hands\"     Ancef [Cefazolin] Rash       Code Status: Full Code  Activity level - Baseline/Home:  Independent. Activity Level - Current:   Assist X 1. Lift room needed: No. Bariatric: No   Needed: No   Isolation: No. Infection: Not Applicable.     Vital Signs:   Vitals:    07/27/20 1609 07/27/20 1820 07/27/20 1830 07/27/20 1834   BP: (!) 165/81 (!) 155/79 (!) 160/85    Pulse: 93 78 85    Resp: 18      Temp: 98.2  F (36.8  C)      TempSrc: Oral      SpO2: 99%  97%    Weight:    114.8 kg (253 lb)       Cardiac Rhythm:  ,      Pain level:    Patient confused: No. Patient Falls Risk: Yes.   Elimination Status: has not voided in ED   Patient Report - Initial Complaint: Toe Problem. Focused Assessment: Patient arrives with dark, cellulitic area of lateral aspect of left little toe. Erythema noted above wound on left leg. Patient still weight-bearing.   Tests Performed: Lab work, X ray. Abnormal Results:   Labs Ordered and Resulted from Time of ED Arrival Up to the Time of Departure from the ED   CBC WITH PLATELETS DIFFERENTIAL - Abnormal; Notable for the following components:       Result Value    RBC Count 4.16 (*)     Hemoglobin 11.7 (*)     Hematocrit 38.4 (*)     MCHC 30.5 (*)     RDW 17.6 (*)     All other components within normal limits   BASIC METABOLIC PANEL - Abnormal; Notable for the following components:    Glucose 127 (*)     Creatinine 1.30 (*)     GFR Estimate 53 (*)     All other components within normal limits   LACTIC ACID WHOLE BLOOD - Abnormal; Notable for the " following components:    Lactic Acid 2.2 (*)     All other components within normal limits   COVID-19 VIRUS (CORONAVIRUS) BY PCR   PERIPHERAL IV CATHETER   BLOOD CULTURE   BLOOD CULTURE     Foot XR, G/E 3 views, left    (Results Pending)     .   Treatments provided: See MAR  Family Comments: N/A  OBS brochure/video discussed/provided to patient:  N/A  ED Medications:   Medications   0.9% sodium chloride BOLUS (1,000 mLs Intravenous New Bag 7/27/20 9935)   clindamycin (CLEOCIN) infusion 900 mg (900 mg Intravenous New Bag 7/27/20 7541)     Drips infusing:  Yes  For the majority of the shift, the patient's behavior Green. Interventions performed were N/A.    Sepsis treatment initiated: No       ED Nurse Name/Phone Number: Margarita Rangel RN,   6:42 PM  RECEIVING UNIT ED HANDOFF REVIEW    Above ED Nurse Handoff Report was reviewed:Yes  Reviewed by: Brittany Han RN on July 27, 2020 at 7:27 PM

## 2020-07-27 NOTE — ED PROVIDER NOTES
History     Chief Complaint:  Toe Pain    HPI   Lance Ibrahim is a 75 year old male with a history of T2 diabetes, CAD, hypertension, and CKD stage 3 who presents alone with pain on his left pinky toe. He first noticed discomfort about five days ago but it started becoming painful and red two days ago. Now it is so sensitive that he is unable to pull his band aid off or put too much pressure on it. He still has feeling in his toes but does have a history of neuropathy. He also complains of a new rash. The past couple of days his blood sugars have been 170-180 but was at his normal level of 130 today. He is on aspirin and torsemide for edema but is not on any antibiotics. He denies any fever, chills, chest pain, or shortness of breath. He does get out of breath when he goes up the stairs but states this is normal. Notably, he has a staph infection last year that went to his bone. He has an appointment with his PCP scheduled for later and plans to get surgical boots/sandals so it doesn't press up on his toe.     Allergies:  Penicillin  Sulfa Drugs  Ancef     Medications:    Norvasc  Aspirin  Atorvastatin  Isosorbide mononitrate  Labetalol  Lisinopril  Metformin  Torsemide  Proscar  Flomax    Past Medical History:    Arthritis  CAD  Cardiomyopathy  Cerebral artery occlusion  Dyspnea on exertion  Hypertension  Hyperlipidemia   Nephrolithiasis  Renal colic  PVD  Orthopnea  T2 diabetes   TIA  Gout  Hernia  Hypertrophy of prostate with urinary obstruction   Amputated left toe  CKD stage 3    Past Surgical History:    Toe amputation  Knee arthroplasty  Diastasis recti repair  ventral hernia repair  ORIF left shoulder  Heart catheterization  eye cataract bilateral  Cyst removal, foot    Family History:    Cancer - father    Social History:  Smoking status: quit 1993  Smokeless tobacco: no  Alcohol use: no  Drug use: no  Marital Status:   [2]     Review of Systems   Constitutional: Negative for chills and fever.    Respiratory: Negative for shortness of breath.    Cardiovascular: Positive for leg swelling. Negative for chest pain.   Skin: Positive for color change, rash and wound.   All other systems reviewed and are negative.    Physical Exam     Patient Vitals for the past 24 hrs:   BP Temp Temp src Pulse Heart Rate Resp SpO2 Weight   07/27/20 1901 -- -- -- -- -- -- 96 % --   07/27/20 1900 (!) 147/90 -- -- 89 -- -- -- --   07/27/20 1834 -- -- -- -- -- -- -- 114.8 kg (253 lb)   07/27/20 1830 (!) 160/85 -- -- 85 -- -- 97 % --   07/27/20 1820 (!) 155/79 -- -- 78 -- -- -- --   07/27/20 1609 (!) 165/81 98.2  F (36.8  C) Oral 93 93 18 99 % --       Physical Exam  General: Alert and interactive. Appears well. Cooperative and pleasant.   Eyes: The pupils are equal and round. EOMs intact. No scleral icterus.  ENT: No abnormalities to the external nose or ears. Mucous membranes moist. Posterior oropharynx is non-erythematous.  Neck: Trachea is in the midline. No nuchal rigidity.     CV: Regular rate and rhythm. S1 and S2 normal without murmur, click, gallop or rub.   Resp: Breath sounds are clear bilaterally, without rhonchi, wheezes, rales. Non-labored, no retractions or accessory muscle use.     GI: Abdomen is soft without distension. No tenderness to palpation. No peritoneal signs.    MS: Moving all extremities well. Good muscle tone. Patient has absent middle third phalanx on left foot. There is tenderness to the left pinky toe.   Chronic ulcer to bottom of right great toe.   Skin: There is discoloration to the left pinky toe, with a developing blood blister and an ulcer that is not open or weeping. There is erythema to the pinky toe. There is some redness along the dorsum of the foot. There is a petechial spotted rash to the anterior aspect of the left shin. These are pictured below.   Neuro: Alert and oriented x 3. No focal neurologic deficits. Good strength and sensation in upper and lower extremities. Psych: Awake. Alert.   Normal affect. Appropriate interactions.  Lymph: No anterior or posterior cervical lymphadenopathy noted.                  Emergency Department Course   Imaging:  Radiology findings were communicated with the patient who voiced understanding of the findings.    Foot XR G/E 3 Views Left  IMPRESSION: Limited evaluation due to patient positioning. Soft tissue swelling throughout the foot. No definite evidence of acute osteomyelitis. Status post amputation of the third toe at the level of the MTP joint. Moderate first MTP joint degenerative   changes. Otherwise mild degenerative changes throughout the foot. Small calcaneal enthesophytes.  As read by Radiology.    Laboratory:  Laboratory findings were communicated with the patient who voiced understanding of the findings.    CBC: HGB 11.7(L), o/w WNL (WBC 10.4, )    BMP: Glucose 127(H), Creatinine 1.3(H), GFR Estimate 53(L), o/w WNL    Lactic acid 1734: 2.2(H)    Blood cultures x2: Pending    Asymptomatic Covid-19 PCR: Pending    Interventions:  1759 NS 1L IV Bolus  1827: Clindamycin 900 mg IV    Emergency Department Course:  Past medical records, nursing notes, and vitals reviewed.  1707: I performed an exam of the patient and obtained history, as documented above.     1800: I spoke with Ilene MCCARTNEY pharmacist.     1820: I spoke with Dr. Azevedo of podiatry.    IV inserted and blood drawn.    The patient was sent for a foot XR while in the emergency department, results above.     1837: I rechecked the patient. I reviewed the results with the Patient and answered all related questions prior to admission.     Findings and plan explained to the Patient who consents to admission. Discussed the patient with Akhil HUITRON, working in conjunction with Dr. Boone, who will admit the patient to a [facility] bed for further monitoring, evaluation, and treatment.  Impression & Plan   Medical Decision Making:  Lance Ibrahim is a 75 year old male with a history of type 2 diabetes,  coronary artery disease, hypertension, chronic kidney disease stage III who presents for evaluation of left pinky toe pain and erythema. He noticed discomfort 5 days ago, but the redness has increased. He is worried about infection given his history of staph infections requiring toe amputations in the past. On my examination, the patient has a nondraining diabetic foot ulcer to the dorsal aspect of his left pinky with a blood blister and associated redness that is streaking up his foot. He has no leukocytosis, significant change to his renal function, or vital abnormalities. His lactic acid initially was 2.2, but he no other signs of sepsis. X-ray of the left foot shows no definitive signs of osteomyelitis. Discussed the case with podiatry who suggested admission for IV antibiotics and MRI to rule out osteomyelitis.The patient was in agreement to this plan. Discussed the case with nurse practitioner Akhil who will admit to an inpatient medical bed for Dr. Boone.    Covid-19  Lance Ibrahim was evaluated during a global COVID-19 pandemic, which necessitated consideration that the patient might be at risk for infection with the SARS-CoV-2 virus that causes COVID-19.   Applicable protocols for evaluation were followed during the patient's care.   COVID-19 was considered as part of the patient's evaluation. The plan for testing is:  a test was obtained during this visit.    Diagnosis:    ICD-10-CM   1. Cellulitis of left toe  L03.032   2. Diabetic ulcer of toe of left foot associated with type 2 diabetes mellitus, unspecified ulcer stage (H)  E11.621    L97.529     Disposition:  Admitted to manish Anne  7/27/2020   [facility] EMERGENCY DEPARTMENT    Scribe Disclosure:  I, Sofiya Anne, am serving as a scribe at 5:02 PM on 7/27/2020 to document services personally performed by Nalini Lerner PA-C based on my observations and the provider's statements to me.           Nalini Lerner PA-C  07/27/20 2101

## 2020-07-27 NOTE — PHARMACY-ADMISSION MEDICATION HISTORY
Admission medication history interview status for this patient is complete. See [facility] admission navigator for allergy information, prior to admission medications and immunization status.     Medication history interview done via telephone during Covid-19 pandemic, indicate source(s): Patient  Medication history resources (including written lists, pill bottles, clinic record): SureScripts and Care Everywhere    Changes made to PTA medication list:  Added: none  Deleted: silver sulfadiazine cream  Changed: loratadine 10mg prn --> 10mg at bedtime prn    Actions taken by pharmacist (provider contacted, etc): Called pt to verify med list     Additional medication history information:None    Medication reconciliation/reorder completed by provider prior to medication history?  N   (Y/N)     For patients on insulin therapy: N  (Y/N)      Prior to Admission medications    Medication Sig Last Dose Taking? Auth Provider   amLODIPine (NORVASC) 5 MG tablet TAKE 1 TABLET BY MOUTH EVERY DAY 7/27/2020 at 0600 Yes Anh Spivey NP   aspirin (ASA) 81 MG EC tablet Take 1 tablet (81 mg) by mouth daily 7/27/2020 at 0600 Yes Nas Linda MD   atorvastatin 40 MG PO tablet Take 1 tablet (40 mg) by mouth daily 7/27/2020 at 0600 Yes Lars Martines MD   Ferrous Gluconate 240 (27 Fe) MG TABS Take 1 tablet by mouth daily  7/27/2020 at 0600 Yes Reported, Patient   finasteride (PROSCAR) 5 MG tablet Take 5 mg by mouth daily. 7/27/2020 at 0600 Yes Reported, Patient   fluticasone (FLONASE) 50 MCG/ACT spray Spray 1 spray into both nostrils nightly as needed  7/26/2020 at pm Yes Unknown, Entered By History   isosorbide mononitrate 30 MG PO 24 hr tablet Take 1 tablet (30 mg) by mouth daily 7/27/2020 at 0600 Yes Lars Martines MD   labetalol (NORMODYNE) 200 MG tablet TAKE 1 TABLET (200 MG) BY MOUTH 2 TIMES DAILY 7/27/2020 at 0600 Yes Anh Spivey NP   lisinopril 10 MG PO tablet Take 1 tablet (10 mg) by  mouth daily 7/27/2020 at 0600 Yes Anh Spivey NP   loratadine (CLARITIN) 10 MG tablet Take 10 mg by mouth nightly as needed  7/26/2020 at pm Yes Reported, Patient   metFORMIN (GLUCOPHAGE) 1000 MG tablet Take 1 tablet (1,000 mg) by mouth 2 times daily (with meals) 7/27/2020 at 0600 Yes Anh Spivey NP   Multiple Vitamins-Minerals (MULTIVITAMIN ADULTS PO) Take 1 tablet by mouth daily  7/27/2020 at 0600 Yes Reported, Patient   tamsulosin (FLOMAX) 0.4 MG 24 hr capsule Take 1 capsule by mouth daily. 7/27/2020 at 0600 Yes Reported, Patient   torsemide (DEMADEX) 20 MG tablet TAKE 1 TABLET BY MOUTH EVERY DAY 7/27/2020 at 0600 Yes Anh Spivey NP   blood glucose (ACCU-CHEK SMARTVIEW) test strip USE TO TEST BLOOD SUGAR 1 TIMES DAILY OR AS DIRECTED.   Anh Spivey NP   blood glucose monitoring (ACCU-CHEK FASTCLIX) lancets USE LANCETS 2 TIMES DAILY, AS DIRECTED.   Anh Spivye NP   order for DME Diabetic shoes and multidensity inserts with offloading under right interphalangeal joint.   Ayaka Azevedo DPM, Podiatry/Foot and Ankle Surgery

## 2020-07-27 NOTE — TELEPHONE ENCOUNTER
[facility] calling requesting on call podiatrist for patient to be paged.     Gave the phone number for [place] answering service and warm transferred them the [place] answering service to be assisted with paging the on call podiatrist.     Cali Barton RN  [facility]

## 2020-07-27 NOTE — ED TRIAGE NOTES
Pt presents with toe pain and concern for infection in the pinky toe of his left foot. This has been ongoing for the past 6-7 days but has been worsening, unable to get into PCP soon enough. Pt alert, oriented x3 ABCs intact

## 2020-07-28 VITALS
HEART RATE: 78 BPM | RESPIRATION RATE: 16 BRPM | HEIGHT: 72 IN | DIASTOLIC BLOOD PRESSURE: 58 MMHG | TEMPERATURE: 98.4 F | WEIGHT: 256.1 LBS | BODY MASS INDEX: 34.69 KG/M2 | SYSTOLIC BLOOD PRESSURE: 110 MMHG | OXYGEN SATURATION: 91 %

## 2020-07-28 LAB
ALBUMIN SERPL-MCNC: 2.8 G/DL (ref 3.4–5)
ALP SERPL-CCNC: 87 U/L (ref 40–150)
ALT SERPL W P-5'-P-CCNC: 27 U/L (ref 0–70)
ANION GAP SERPL CALCULATED.3IONS-SCNC: 3 MMOL/L (ref 3–14)
AST SERPL W P-5'-P-CCNC: 17 U/L (ref 0–45)
BILIRUB SERPL-MCNC: 0.3 MG/DL (ref 0.2–1.3)
BUN SERPL-MCNC: 20 MG/DL (ref 7–30)
CALCIUM SERPL-MCNC: 8.4 MG/DL (ref 8.5–10.1)
CHLORIDE SERPL-SCNC: 106 MMOL/L (ref 94–109)
CO2 SERPL-SCNC: 28 MMOL/L (ref 20–32)
CREAT SERPL-MCNC: 1.19 MG/DL (ref 0.66–1.25)
CRP SERPL-MCNC: 48.3 MG/L (ref 0–8)
ERYTHROCYTE [DISTWIDTH] IN BLOOD BY AUTOMATED COUNT: 17.2 % (ref 10–15)
ERYTHROCYTE [SEDIMENTATION RATE] IN BLOOD BY WESTERGREN METHOD: 39 MM/H (ref 0–20)
GFR SERPL CREATININE-BSD FRML MDRD: 59 ML/MIN/{1.73_M2}
GLUCOSE BLDC GLUCOMTR-MCNC: 128 MG/DL (ref 70–99)
GLUCOSE BLDC GLUCOMTR-MCNC: 134 MG/DL (ref 70–99)
GLUCOSE BLDC GLUCOMTR-MCNC: 93 MG/DL (ref 70–99)
GLUCOSE SERPL-MCNC: 138 MG/DL (ref 70–99)
HCT VFR BLD AUTO: 35.2 % (ref 40–53)
HGB BLD-MCNC: 10.7 G/DL (ref 13.3–17.7)
LACTATE BLD-SCNC: 1.6 MMOL/L (ref 0.7–2)
LACTATE BLD-SCNC: 2.4 MMOL/L (ref 0.7–2)
LACTATE BLD-SCNC: 3 MMOL/L (ref 0.7–2)
MCH RBC QN AUTO: 27.9 PG (ref 26.5–33)
MCHC RBC AUTO-ENTMCNC: 30.4 G/DL (ref 31.5–36.5)
MCV RBC AUTO: 92 FL (ref 78–100)
PLATELET # BLD AUTO: 219 10E9/L (ref 150–450)
POTASSIUM SERPL-SCNC: 4.5 MMOL/L (ref 3.4–5.3)
PROT SERPL-MCNC: 6.9 G/DL (ref 6.8–8.8)
RBC # BLD AUTO: 3.83 10E12/L (ref 4.4–5.9)
SARS-COV-2 RNA SPEC QL NAA+PROBE: NOT DETECTED
SODIUM SERPL-SCNC: 137 MMOL/L (ref 133–144)
SPECIMEN SOURCE: NORMAL
WBC # BLD AUTO: 7.4 10E9/L (ref 4–11)

## 2020-07-28 PROCEDURE — 83605 ASSAY OF LACTIC ACID: CPT | Mod: 91 | Performed by: INTERNAL MEDICINE

## 2020-07-28 PROCEDURE — 10060 I&D ABSCESS SIMPLE/SINGLE: CPT | Mod: 51 | Performed by: PODIATRIST

## 2020-07-28 PROCEDURE — G0378 HOSPITAL OBSERVATION PER HR: HCPCS

## 2020-07-28 PROCEDURE — 25000125 ZZHC RX 250: Performed by: NURSE PRACTITIONER

## 2020-07-28 PROCEDURE — 82565 ASSAY OF CREATININE: CPT | Performed by: NURSE PRACTITIONER

## 2020-07-28 PROCEDURE — 36415 COLL VENOUS BLD VENIPUNCTURE: CPT | Performed by: NURSE PRACTITIONER

## 2020-07-28 PROCEDURE — 85027 COMPLETE CBC AUTOMATED: CPT | Performed by: NURSE PRACTITIONER

## 2020-07-28 PROCEDURE — 36415 COLL VENOUS BLD VENIPUNCTURE: CPT | Performed by: INTERNAL MEDICINE

## 2020-07-28 PROCEDURE — 96376 TX/PRO/DX INJ SAME DRUG ADON: CPT

## 2020-07-28 PROCEDURE — 25800030 ZZH RX IP 258 OP 636: Performed by: INTERNAL MEDICINE

## 2020-07-28 PROCEDURE — L3260 AMBULATORY SURGICAL BOOT EAC: HCPCS

## 2020-07-28 PROCEDURE — 86140 C-REACTIVE PROTEIN: CPT | Performed by: NURSE PRACTITIONER

## 2020-07-28 PROCEDURE — 25000132 ZZH RX MED GY IP 250 OP 250 PS 637: Performed by: NURSE PRACTITIONER

## 2020-07-28 PROCEDURE — 00000146 ZZHCL STATISTIC GLUCOSE BY METER IP

## 2020-07-28 PROCEDURE — 99207 ZZC CDG-CODE CATEGORY CHANGED: CPT | Performed by: INTERNAL MEDICINE

## 2020-07-28 PROCEDURE — 83605 ASSAY OF LACTIC ACID: CPT | Performed by: NURSE PRACTITIONER

## 2020-07-28 PROCEDURE — 99222 1ST HOSP IP/OBS MODERATE 55: CPT | Mod: 25 | Performed by: PODIATRIST

## 2020-07-28 PROCEDURE — 25000128 H RX IP 250 OP 636: Performed by: INTERNAL MEDICINE

## 2020-07-28 PROCEDURE — 86140 C-REACTIVE PROTEIN: CPT | Performed by: PODIATRIST

## 2020-07-28 PROCEDURE — 11042 DBRDMT SUBQ TIS 1ST 20SQCM/<: CPT

## 2020-07-28 PROCEDURE — 11042 DBRDMT SUBQ TIS 1ST 20SQCM/<: CPT | Performed by: PODIATRIST

## 2020-07-28 PROCEDURE — 80053 COMPREHEN METABOLIC PANEL: CPT | Performed by: NURSE PRACTITIONER

## 2020-07-28 PROCEDURE — 99231 SBSQ HOSP IP/OBS SF/LOW 25: CPT | Performed by: INTERNAL MEDICINE

## 2020-07-28 PROCEDURE — 85652 RBC SED RATE AUTOMATED: CPT | Performed by: NURSE PRACTITIONER

## 2020-07-28 RX ORDER — LIDOCAINE HYDROCHLORIDE 10 MG/ML
10 INJECTION, SOLUTION EPIDURAL; INFILTRATION; INTRACAUDAL; PERINEURAL ONCE
Status: DISCONTINUED | OUTPATIENT
Start: 2020-07-28 | End: 2020-07-28 | Stop reason: HOSPADM

## 2020-07-28 RX ORDER — DOXYCYCLINE 100 MG/1
100 CAPSULE ORAL 2 TIMES DAILY
Qty: 20 CAPSULE | Refills: 0 | Status: SHIPPED | OUTPATIENT
Start: 2020-07-28 | End: 2020-08-07

## 2020-07-28 RX ORDER — ASPIRIN 81 MG/1
81 TABLET, CHEWABLE ORAL DAILY
Status: DISCONTINUED | OUTPATIENT
Start: 2020-07-29 | End: 2020-07-28 | Stop reason: HOSPADM

## 2020-07-28 RX ADMIN — CLINDAMYCIN IN 5 PERCENT DEXTROSE 900 MG: 18 INJECTION, SOLUTION INTRAVENOUS at 02:32

## 2020-07-28 RX ADMIN — TAMSULOSIN HYDROCHLORIDE 0.4 MG: 0.4 CAPSULE ORAL at 08:36

## 2020-07-28 RX ADMIN — CLINDAMYCIN IN 5 PERCENT DEXTROSE 900 MG: 18 INJECTION, SOLUTION INTRAVENOUS at 10:38

## 2020-07-28 RX ADMIN — TORSEMIDE 20 MG: 20 TABLET ORAL at 08:37

## 2020-07-28 RX ADMIN — SODIUM CHLORIDE, POTASSIUM CHLORIDE, SODIUM LACTATE AND CALCIUM CHLORIDE 1000 ML: 600; 310; 30; 20 INJECTION, SOLUTION INTRAVENOUS at 14:30

## 2020-07-28 RX ADMIN — AMLODIPINE BESYLATE 5 MG: 5 TABLET ORAL at 08:36

## 2020-07-28 RX ADMIN — ATORVASTATIN CALCIUM 40 MG: 40 TABLET, FILM COATED ORAL at 08:37

## 2020-07-28 RX ADMIN — LABETALOL HYDROCHLORIDE 200 MG: 100 TABLET, FILM COATED ORAL at 08:36

## 2020-07-28 RX ADMIN — FERROUS GLUCONATE 324 MG: 324 TABLET ORAL at 08:37

## 2020-07-28 RX ADMIN — VANCOMYCIN HYDROCHLORIDE 2000 MG: 10 INJECTION, POWDER, LYOPHILIZED, FOR SOLUTION INTRAVENOUS at 06:45

## 2020-07-28 RX ADMIN — FINASTERIDE 5 MG: 5 TABLET, FILM COATED ORAL at 08:37

## 2020-07-28 RX ADMIN — ISOSORBIDE MONONITRATE 30 MG: 30 TABLET, EXTENDED RELEASE ORAL at 08:36

## 2020-07-28 ASSESSMENT — ACTIVITIES OF DAILY LIVING (ADL)
ADLS_ACUITY_SCORE: 12

## 2020-07-28 NOTE — UTILIZATION REVIEW
Admission Status; Secondary Review Determination       Under the authority of the Utilization Management Committee, the utilization review process indicated a secondary review on the above patient. The review outcome is based on review of the medical records, discussions with staff, and applying clinical experience noted on the date of the review.     (x) Inpatient Status Appropriate - This patient's medical care is consistent with medical management for inpatient care and reasonable inpatient medical practice.     RATIONALE FOR DETERMINATION   75 year old male who present to the ED with Left 5th toe diabetic ulcer/LLE cellulitis, had elevated lactate at 2.2.  Concern for osteomyelitis and abscess with MRI showing 2 cm fluid collection in the fifth toe dorsally, marrow edema involving the fourth distal phalanx.  Started on broad-spectrum intravenous antibiotic, kept n.p.o. for anticipated surgery. The expected length of stay at the time of admission was more than 2 nights because of the severity of illness, intensity of service provided, and risk for adverse outcome. Inpatient admission is appropriate.     This document was produced using voice recognition software       The information on this document is developed by the utilization review team in order for the business office to ensure compliance. This only denotes the appropriateness of proper admission status and does not reflect the quality of care rendered.   The definitions of Inpatient Status and Observation Status used in making the determination above are those provided in the CMS Coverage Manual, Chapter 1 and Chapter 6, section 70.4.   Sincerely,   JAYDEN CHEN MD   System Medical Director   Utilization Management   [facility].

## 2020-07-28 NOTE — PROVIDER NOTIFICATION
Pt inquiring if discharging today since podiatry signed off. lactic this am was 2.4. Thanks    Dr. Jeromy martinez

## 2020-07-28 NOTE — CONSULTS
PATIENT HISTORY:  Lance Ibrahim is a 75 year old male who was admitted for left foot wound, cellulitis.      I was asked to see Lance Ibrahim  by  for left 5th toe wound.    Patient was seen at bedside. States he noticed his toe turn red yesterday and came to ED. Denies injury. Did get new diabetic shoes yesterday.      Review of Systems:  Patient denies fever, chills, rash, stiffness, limping, weakness, heart burn, blood in stool, chest pain with activity, calf pain when walking, shortness of breath with activity, chronic cough, easy bleeding/bruising, swelling of ankles, excessive thirst, fatigue, depression, anxiety.  Patient admits to numbness, wound.     PAST MEDICAL HISTORY:   Past Medical History:   Diagnosis Date     Abnormal stress test 3/4/2019    Added automatically from request for surgery [id number]     Arthritis     osteoarthritis knees     CAD (coronary artery disease)     subtotal occlusion in the small distal LAD      Cardiomyopathy (H)      Cerebral artery occlusion with cerebral infarction (H)     TIA 1993, no residual     Chest pain      Chronic rhinitis      ISBELL (dyspnea on exertion)      Dyspnea 02/17/2019     Edema      HTN (hypertension)      Hyperlipidemia      Hypertrophy of prostate with urinary obstruction and other lower urinary tract symptoms (LUTS)      Nephrolithiasis      Orthopnea      PVD (peripheral vascular disease) (H)      Renal colic      S/P cardiac cath 03/05/2019    subtotal occlusion in the small distal LAD      Sleep apnea     doesn't use cpap     TIA (transient ischaemic attack) 1993     Type 2 diabetes mellitus with peripheral vascular disease (H) 11/8/2018     Unspecified transient cerebral ischemia 1993    No definite source found     Ureteral stone         PAST SURGICAL HISTORY:   Past Surgical History:   Procedure Laterality Date     AMPUTATE TOE(S) Left 10/15/2018    Procedure: AMPUTATE TOE(S);  Left third toe amputation;  Surgeon: Ayaka Azevedo DPM,  Podiatry/Foot and Ankle Surgery;  Location: [facility] OR     ARTHROPLASTY KNEE Right 12/7/2018    Procedure: Right total knee arthroplasty;  Surgeon: Issa Cunha MD;  Location: [facility] OR     C NONSPECIFIC PROCEDURE  1985    Diastasis recti repair     C NONSPECIFIC PROCEDURE  1987    Ventral hernia repair     C NONSPECIFIC PROCEDURE      ORIF of left shoulder     COLONOSCOPY  3/9/2013    Procedure: COLONOSCOPY;  COLONOSCOPY;  Surgeon: Chau Hogan MD;  Location:  GI     CV HEART CATHETERIZATION WITH POSSIBLE INTERVENTION Left 3/5/2019    Procedure: Coronary Angiogram;  Surgeon: Nas Linda MD;  Location:  HEART CARDIAC CATH LAB     EYE SURGERY  03/13/2017    left eye cataract     EYE SURGERY      Right eye cataract     FOOT SURGERY  4/2013    cyst removal      HERNIA REPAIR  7/13/2004    ventral         MEDICATIONS:   Current Facility-Administered Medications:      amLODIPine (NORVASC) tablet 5 mg, 5 mg, Oral, Daily, Kourtney Landaverde, NP, 5 mg at 07/28/20 0836     atorvastatin (LIPITOR) tablet 40 mg, 40 mg, Oral, Daily, Kourtney Landaverde NP, 40 mg at 07/28/20 0837     clindamycin (CLEOCIN) infusion 900 mg, 900 mg, Intravenous, Q8H, Kourtney Landaverde NP, Last Rate: 50 mL/hr at 07/28/20 0232, 900 mg at 07/28/20 0232     glucose gel 15-30 g, 15-30 g, Oral, Q15 Min PRN **OR** dextrose 50 % injection 25-50 mL, 25-50 mL, Intravenous, Q15 Min PRN **OR** glucagon injection 1 mg, 1 mg, Subcutaneous, Q15 Min PRN, Kourtney Landaverde NP     ferrous gluconate (FERGON) tablet 324 mg, 324 mg, Oral, Daily, Kourtney Landaverde NP, 324 mg at 07/28/20 0837     finasteride (PROSCAR) tablet 5 mg, 5 mg, Oral, Daily, Kourtney Landaverde NP, 5 mg at 07/28/20 0837     fluticasone (FLONASE) 50 MCG/ACT spray 1 spray, 1 spray, Both Nostrils, At Bedtime PRN, Kourtney Landaverde NP, 1 spray at 07/27/20 2226     insulin aspart (NovoLOG) injection (RAPID ACTING), 1-7 Units, Subcutaneous, TID Akhil HERNANDEZ Veronica L, NP     insulin  aspart (NovoLOG) injection (RAPID ACTING), 1-5 Units, Subcutaneous, At Bedtime, Kourtney Landaverde NP     isosorbide mononitrate (IMDUR) 24 hr tablet 30 mg, 30 mg, Oral, Daily, Kourtney Landaverde NP, 30 mg at 07/28/20 0836     labetalol (NORMODYNE) tablet 200 mg, 200 mg, Oral, BID, Kourtney Landaverde NP, 200 mg at 07/28/20 0836     lidocaine (LMX4) cream, , Topical, Q1H PRN, Kourtney Landaverde NP     lidocaine (PF) (XYLOCAINE) 1 % injection 10 mL, 10 mL, Subcutaneous, Once, Ayaka Azevedo, STAR, Podiatry/Foot and Ankle Surgery     lidocaine 1 % 0.1-1 mL, 0.1-1 mL, Other, Q1H PRN, Kourtney Landaverde NP     loratadine (CLARITIN) tablet 10 mg, 10 mg, Oral, At Bedtime PRN, Kourtney Landaverde NP, 10 mg at 07/27/20 2226     melatonin tablet 1 mg, 1 mg, Oral, At Bedtime PRN, Kourtney Landaverde NP     naloxone (NARCAN) injection 0.1-0.4 mg, 0.1-0.4 mg, Intravenous, Q2 Min PRN, Kourtney Landaverde NP     ondansetron (ZOFRAN-ODT) ODT tab 4 mg, 4 mg, Oral, Q6H PRN **OR** ondansetron (ZOFRAN) injection 4 mg, 4 mg, Intravenous, Q6H PRN, Kourtney Landaverde NP     senna-docusate (SENOKOT-S/PERICOLACE) 8.6-50 MG per tablet 1 tablet, 1 tablet, Oral, BID **OR** senna-docusate (SENOKOT-S/PERICOLACE) 8.6-50 MG per tablet 2 tablet, 2 tablet, Oral, BID, Kourtney Landaverde NP     sodium chloride (PF) 0.9% PF flush 3 mL, 3 mL, Intracatheter, q1 min prn, Akhil, Kourtney L, NP     sodium chloride (PF) 0.9% PF flush 3 mL, 3 mL, Intracatheter, Q8H, Kourtney Landaverde L, NP, 3 mL at 07/28/20 0408     tamsulosin (FLOMAX) capsule 0.4 mg, 0.4 mg, Oral, Daily, Kourtney Landaverde L, NP, 0.4 mg at 07/28/20 0836     torsemide (DEMADEX) tablet 20 mg, 20 mg, Oral, Daily, Kourtney Landaverde, NP, 20 mg at 07/28/20 0837     vancomycin (VANCOCIN) 2,000 mg in sodium chloride 0.9 % 500 mL intermittent infusion, 2,000 mg, Intravenous, Q12H, Charanjit Boone MD, 2,000 mg at 07/28/20 0645     ALLERGIES:    Allergies   Allergen Reactions      "Penicillins Anaphylaxis     \"anaphylactic\"     Sulfa Drugs      \"itchy rash, swelling of face and hands\"     Ancef [Cefazolin] Rash        SOCIAL HISTORY:   Social History     Socioeconomic History     Marital status:      Spouse name: Not on file     Number of children: Not on file     Years of education: Not on file     Highest education level: Not on file   Occupational History     Employer: SELF   Social Needs     Financial resource strain: Not on file     Food insecurity     Worry: Not on file     Inability: Not on file     Transportation needs     Medical: Not on file     Non-medical: Not on file   Tobacco Use     Smoking status: Former Smoker     Packs/day: 0.00     Last attempt to quit: 3/17/1993     Years since quittin.3     Smokeless tobacco: Never Used   Substance and Sexual Activity     Alcohol use: Not Currently     Drug use: No     Sexual activity: Never   Lifestyle     Physical activity     Days per week: Not on file     Minutes per session: Not on file     Stress: Not on file   Relationships     Social connections     Talks on phone: Not on file     Gets together: Not on file     Attends Oriental orthodox service: Not on file     Active member of club or organization: Not on file     Attends meetings of clubs or organizations: Not on file     Relationship status: Not on file     Intimate partner violence     Fear of current or ex partner: Not on file     Emotionally abused: Not on file     Physically abused: Not on file     Forced sexual activity: Not on file   Other Topics Concern     Parent/sibling w/ CABG, MI or angioplasty before 65F 55M? Not Asked   Social History Narrative     Not on file        FAMILY HISTORY:   Family History   Problem Relation Age of Onset     Family History Negative Mother          86 yo     Cancer Father          76 yo brain     Diabetes Maternal Grandfather          89 yo     Alcohol/Drug Paternal Grandfather              Colon Cancer " No family hx of         EXAM:Vitals: /57   Pulse 78   Temp 98.8  F (37.1  C) (Oral)   Resp 18   Ht 1.829 m (6')   Wt 116.2 kg (256 lb 1.6 oz)   SpO2 92%   BMI 34.73 kg/m    BMI= Body mass index is 34.73 kg/m .    LABS:    WBC   Date Value Ref Range Status   07/28/2020 7.4 4.0 - 11.0 10e9/L Final     HA1C: 7.1 (7/2020)    Lactic acid: 2.4    ESR: 39  CRP: 48.3    General appearance: Patient is alert and fully cooperative with history & exam.  No sign of distress is noted during the visit.      Psychiatric: Affect is pleasant & appropriate.  Patient appears motivated to improve health.       Respiratory: Breathing is regular & unlabored while sitting.      HEENT: Hearing is intact to spoken word.  Speech is clear.  No gross evidence of visual impairment that would impact ambulation.       Dermatologic: right foot: full thickness ulcer to plantar right 1st interphalangeal joint after debridement of pre - ulcerative callus. Measures 1.7cm x 0.9cm 0.4cm.  Base of wound is granular. No purulent drainage noted. No redness or signs of acute infection noted. Fat layer exposed but no exposed tendon or bone.     Left foot:  Abscess to dorsal left 5th toe. Upon debridement, purulent material expressed and cultures sent. wound after debridement measures 1.3cm x 2.0cm x 0.3cm. fat layer exposed but no exposed tendon or bone.      Vascular: DP & PT pulses are intact & regular bilaterally.   edema but no varicosities noted.  CFT and skin temperature is normal to both lower extremities.       Neurologic: Lower extremity sensation is diminished to feet.      Musculoskeletal: Patient is ambulatory without assistive device or brace.  Previous left 3rd toe amputation.     IMAGING: left foot xray - Limited evaluation due to patient positioning. Soft tissue swelling throughout the foot. No definite evidence of acute osteomyelitis. Status post amputation of the third toe at the level of the MTP joint. Moderate first MTP joint  degenerative changes. Otherwise mild degenerative changes throughout the foot. Small calcaneal enthesophytes.    MRI left foot: 1.  Moderate dorsal forefoot soft tissue edema is most concerning for cellulitis.  2.  The 2 cm fluid collection in the fifth toe dorsally may represent an abscess versus blister.  3.  Marrow edema involving the fourth distal phalanx is nonspecific and poorly evaluated due to patient positioning and motion artifact; osteomyelitis cannot be excluded.  4.  Other ancillary findings as described above.     ASSESSMENT: 75 yr old diabetic male with neuropathy, new ulceration left 5th toe with fat layer exposed, abscess, cellulitis left foot.      PLAN:  Reviewed patient's chart in epic.  -ulcer debridement right foot, incision and drainage done left 5th toe. See procedure notes below.  -cultures sent for left 5th toe ulcer.   -mri negative for bone infection. No indication for surgery.   -Okay to eat.   -will order DHII shoe for left foot. Can weight bearing left foot as tolerated in DHII shoe. Has diabetic shoes for right foot.   -recommend daily dressing changes with silvadene cream to both foot ulcers daily.   -Recommend home on oral doxycycline 100mg bid for 10 days.   -Patient has follow up with me next week.   -Okay to be discharged from podiatry standpoint. (orders and recs placed).  -Podiatry will sign off. Please call with questions or concerns.     Procedure 1: After verbal consent, excisional debridement was performed on right foot ulcer.  #15 blade was used to debride ulcer down to and including subcutaneous tissue. Bleeding controlled with light pressure.   No drainage noted.  No anesthesia was used due to neuropathy. Dry dressing applied to foot.  Patient tolerated procedure well.    Procedure 2: After verbal consent, the left 5th toe was anesthetized with 1% lidocaine plain. The foot was prepped and draped using sterile technique. Left 5th toe abscess was then incised with #15  blade. purlulent was expressed. This was cultured and sent to lab. Wound was flushed with copious amounts of normal saline.  All non viable tissue was excised down to and including subcutaneous tissue with and number 15 blade. See wound measurement above after debridement.  Bleeding controlled with light pressure.   No drainage noted.  No anesthesia was used due to neuropathy. Dry dressing applied to foot.  Patient tolerated procedure well.      Ayaka Azevedo DPM, Podiatry/Foot and Ankle Surgery    8:38 AM

## 2020-07-28 NOTE — PROGRESS NOTES
Follow-up:    Patient requesting to go home later today as he takes care of his disabled wife.  Recommended that he stay overnight for IV antibiotics for the cellulitis.  However, he strongly desires to go home given the social situation. Has been cleared for discharge from podiatry perspective.       Clinically he looks well otherwise and feels a lot better, no fevers, blood pressure stable. However, his lactic acid is at 3.0.  We will give a liter fluid bolus and recheck.  If lactic acid is improving, then reasonable to discharge home.  Otherwise monitor in the hospital overnight.

## 2020-07-28 NOTE — PLAN OF CARE
[place]: A&O  VS: /58 (BP Location: Left arm)   Pulse 78   Temp 98.4  F (36.9  C) (Oral)   Resp 16   Ht 1.829 m (6')   Wt 116.2 kg (256 lb 1.6 oz)   SpO2 91%   BMI 34.73 kg/m    LS: dim, ISBELL on RA.   GI: active, last BM 7/27, denies nausea  : Voiding  in BR w/o difficulty  Skin: left 5th digit foot black, red, dressing CDI. Left leg red  Michael. Right posterior foot, scabbing, dressing CDI. Lower ext edema.  Activity: SBA   Diet: mod carb. Pt refused to eat  Dinner.  Pain: c/o 7/10 left 5th digit foot pain. Declines  intervention.  Lab: lactic 2.4, 3.0 and repeat 1.6. , 134, 93.  Plan: vanco, cleocin, podiatry, orthotics, discharge on doxy

## 2020-07-28 NOTE — PROGRESS NOTES
[facility]    Medicine Progress Note - Hospitalist Service       Date of Admission:  7/27/2020  Date of Service: 07/28/2020    Assessment & Plan     Lance Ibrahim is a 75 year old male who present to the ED with LLE foot ulcer     Left 5th toe diabetic ulcer/LLE cellulitis: Noticed pain 5 days ago, red 2 days ago, hx of osteo, staph infection last year. Afebrile, no leukocytosis. Lactate at 2.2, 1L NaCl provided and recheck 2.1. MRI showed abscess/blister in fifth toe dorsally  -- Seen by podiatry. Bedside debridement and abscess drainage was done. Cultures were sent.  No plan for further surgery per podiatry.  --Await the results of wound cultures.  The cellulitis on the leg is improving.  On vancomycin and clindamycin       Chronic RLE ulcer: Stable.   -Follow up as previously planned with podiatry      HX of TIA: no residual effects per pt  -PTA statin, resume aspirin     DM II: A1C 7.7 in Feb 2020. Per pt -145 daily  -Hold metformin  -Cons carb diet, ssi     LAURA: Chronic and stable  -PTA iron sup      BPH: Chronic and stable  -PTA proscar and flomax     CHF/HTN/CAD: WT stable base 250-254. Uses compression stockings daily at baseline. No s/s of exacerbation. Last Echo with EF of 50-55% 3/2020   -PTA imdur, labetalol, demadex  -Holding lisinopril   -Judicious use of IVF  -Low Na diet      BRIANNA: Not using CPAP     CKD 3: creat at baseline 1.30  -Holding ACE-I, Metformin in case of OR  -BMP in am     Diet: Moderate Consistent CHO Diet    DVT Prophylaxis: Pneumatic Compression Devices  Barnes Catheter: not present  Code Status: Full Code      Disposition Plan   Expected discharge: 1-2 days, recommended to prior living arrangement once antibiotic plan established and improvement in cellulitis   Entered: Madi Pinzon MD 07/28/2020, 12:36 PM       The patient's care was discussed with the Bedside Nurse and Patient.    Madi Pinzon MD  Hospitalist Service  [facility]  [facility]    ______________________________________________________________________    Interval History   Chart reviewed and patient seen. Case discussed with nursing staff.     Patient feels well, reports that the redness on foot and leg is improving, Denies any chest pain, shortness of breath. No reports of abdominal pain or vomiting. No new complaints voiced otherwise.       Data reviewed today: I reviewed all medications, new labs and imaging results over the last 24 hours. I personally reviewed    Physical Exam   Vital Signs: Temp: 98  F (36.7  C) Temp src: Oral BP: 108/60 Pulse: 78 Heart Rate: 81 Resp: 16 SpO2: 94 % O2 Device: None (Room air)    Weight: 256 lbs 1.6 oz    GENERAL:  Awake and alert, No acute distress.  PSYCH: appropriate affect, no acute agitation   HEENT:  Neck is Supple, trachea is midline, EOMI, conjunctiva clear  CARDIOVASCULAR: Regular rate and rhythm, Normal S1, S2, no loud murmurs, no rubs or gallops.   PULMONARY:  Clear to auscultation bilaterally. Good air entry on both sides  GI: Abdomen is soft, non tender, non-distended, bowel sounds present. No rebound or guarding   SKIN:  No cyanosis or clubbing, blister on left 5th toe with black discoloration, associated redness extending up to leg, receeding from previous margins   MSK: Extremities are warm and well perfused. Previous left foot third toe amputation  Neuro: Awake and oriented x 3. Moving all extremities with good strength     Data   Recent Labs   Lab 07/28/20  0626 07/27/20  1734   WBC 7.4 10.4   HGB 10.7* 11.7*   MCV 92 92    247    137   POTASSIUM 4.5 3.9   CHLORIDE 106 104   CO2 28 25   BUN 20 26   CR 1.19 1.30*   ANIONGAP 3 8   SHANNAN 8.4* 8.9   * 127*   ALBUMIN 2.8*  --    PROTTOTAL 6.9  --    BILITOTAL 0.3  --    ALKPHOS 87  --    ALT 27  --    AST 17  --        Recent Results (from the past 24 hour(s))   Foot XR, G/E 3 views, left    Narrative    EXAM: XR FOOT LT G/E 3 VW  LOCATION: [facility]  Services  DATE/TIME: 7/27/2020 5:51 PM    INDICATION: Left pinky toe ulcer and cellulitis  COMPARISON: None.      Impression    IMPRESSION: Limited evaluation due to patient positioning. Soft tissue swelling throughout the foot. No definite evidence of acute osteomyelitis. Status post amputation of the third toe at the level of the MTP joint. Moderate first MTP joint degenerative   changes. Otherwise mild degenerative changes throughout the foot. Small calcaneal enthesophytes.   MR Foot Left w/o Contrast    Narrative    EXAM: MR FOOT LEFT W/O CONTRAST  LOCATION: [facility]  DATE/TIME: 7/27/2020 8:58 PM    INDICATION: Osteomyelitis suspected, foot swelling, diabetic  COMPARISON: Radiographs from earlier today  TECHNIQUE: Unenhanced.    FINDINGS: Limited evaluation due to patient positioning and motion artifact.    Nonspecific marrow edema involving the fourth distal phalanx. Otherwise no suspicious marrow edema to suggest acute osteomyelitis. No acute fracture or malalignment. Moderate to severe first and second MTP joint degenerative changes. Mild multifocal IP   joint degenerative changes. Status post amputation of the third toe at the level of the MTP joint. Subchondral cysts in the first metatarsal head.    Moderate dorsal forefoot soft tissue edema. There is a small fluid collection superficially in the dorsal fifth toe which measures 2 cm in length.    Diffuse muscle atrophy in keeping with diabetic neuropathy. No definite tendon tear.        Impression    IMPRESSION:  1.  Moderate dorsal forefoot soft tissue edema is most concerning for cellulitis.  2.  The 2 cm fluid collection in the fifth toe dorsally may represent an abscess versus blister.  3.  Marrow edema involving the fourth distal phalanx is nonspecific and poorly evaluated due to patient positioning and motion artifact; osteomyelitis cannot be excluded.  4.  Other ancillary findings as described above.       Medications       amLODIPine   5 mg Oral Daily     atorvastatin  40 mg Oral Daily     clindamycin  900 mg Intravenous Q8H     ferrous gluconate  324 mg Oral Daily     finasteride  5 mg Oral Daily     insulin aspart  1-7 Units Subcutaneous TID AC     insulin aspart  1-5 Units Subcutaneous At Bedtime     isosorbide mononitrate  30 mg Oral Daily     labetalol  200 mg Oral BID     lidocaine (PF)  10 mL Subcutaneous Once     senna-docusate  1 tablet Oral BID    Or     senna-docusate  2 tablet Oral BID     sodium chloride (PF)  3 mL Intracatheter Q8H     tamsulosin  0.4 mg Oral Daily     torsemide  20 mg Oral Daily     vancomycin (VANCOCIN) IV  2,000 mg Intravenous Q12H

## 2020-07-28 NOTE — PLAN OF CARE
Patient's After Visit Summary was reviewed with patient .   Patient verbalized understanding of After Visit Summary, recommended follow up and was given an opportunity to ask questions.   Discharge medications sent home with patient/family: YES, doxycycline   Discharged with other:self    Belongings taken with. Pt wheeled to front lobby by staff. Pt verb understanding. Did not want dose of abx before  discharging. Orthotics shoe in place. Pt has silvadene  and dressing supplies at home. Pt has no further needs.    /58 (BP Location: Left arm)   Pulse 78   Temp 98.4  F (36.9  C) (Oral)   Resp 16   Ht 1.829 m (6')   Wt 116.2 kg (256 lb 1.6 oz)   SpO2 91%   BMI 34.73 kg/m

## 2020-07-28 NOTE — PROGRESS NOTES
Fit patient with DH shoe. Patient familiar with them and has no questions. Contact and written instructions given.  Nathaniel SOTOMAYOR.

## 2020-07-28 NOTE — PHARMACY-VANCOMYCIN DOSING SERVICE
Pharmacy Vancomycin Initial Note  Date of Service 2020  Patient's  1945  75 year old, male    Indication: Skin and Soft Tissue Infection    Current estimated CrCl = Estimated Creatinine Clearance: 63.6 mL/min (A) (based on SCr of 1.3 mg/dL (H)).    Creatinine for last 3 days  2020:  5:34 PM Creatinine 1.30 mg/dL    Recent Vancomycin Level(s) for last 3 days  No results found for requested labs within last 72 hours.      Vancomycin IV Administrations (past 72 hours)                   vancomycin (VANCOCIN) 2,500 mg in sodium chloride 0.9 % 500 mL intermittent infusion (mg) 2,500 mg New Bag 20 192                Nephrotoxins and other renal medications (From now, onward)    Start     Dose/Rate Route Frequency Ordered Stop    20 0700  vancomycin (VANCOCIN) 2,000 mg in sodium chloride 0.9 % 500 mL intermittent infusion      2,000 mg  over 2 Hours Intravenous EVERY 12 HOURS 20            Contrast Orders - past 72 hours (72h ago, onward)    None                Plan:  1.  Start vancomycin  2000 mg IV q12h.   2.  Goal Trough Level: 15-20 mg/L   3.  Pharmacy will check trough levels as appropriate in 1-3 Days.    4. Serum creatinine levels will be ordered daily for the first week of therapy and at least twice weekly for subsequent weeks.    5. Shannon method utilized to dose vancomycin therapy: Method 1    Myah Mari [facility]

## 2020-07-28 NOTE — PLAN OF CARE
Localized redness on left leg has not moved past drawn border. L. Pinky toe black with blanchable redness on toe and left leg. Scabbing on R. Great toe. AxOx4. Stand by assist. NPO. Dyspnea on exertion. Denies SOB/CP/chills.

## 2020-07-28 NOTE — PLAN OF CARE
19:45 Pt arrived to [facility] from ED.  Assisted into bed and oriented to room.  Continue plan of care.    21:35  Pt c/o mild pain in left foot / pinky toe wound. Has cellulitis left leg/foot and a wound on bottom of right great toe. He is alert and oriented. Ambulates with a steady gait.  Stand by assist to independent. Dyspnea on exertion.  Lung sounds clear. 95% RA. 2 GM sodium diet now, then NPO at midnight for podiatry consult and possible toe surgery tomorrow.  MRI left foot done tonight.

## 2020-07-29 ENCOUNTER — TELEPHONE (OUTPATIENT)
Dept: INTERNAL MEDICINE | Facility: CLINIC | Age: 75
End: 2020-07-29

## 2020-07-29 NOTE — TELEPHONE ENCOUNTER
"Hospital/TCU/ED for chronic condition Discharge Protocol    \"Hi, my name is Lorrie Blevins RN, a registered nurse, and I am calling from [facility].  I am calling to follow up and see how things are going for you after your recent emergency visit/hospital/TCU stay.\"    Tell me how you are doing now that you are home?\" patient states he is doing a lot better, not having pain, swelling and redness is down significantly       Discharge Instructions    \"Let's review your discharge instructions.  What is/are the follow-up recommendations?  Pt. Response: Follow up with Dr. Azevedo at [facility] on 8/4/2020 at 3:15pm. For questions or concerns, call: [phone]  Follow-up and recommended labs and tests  Follow up with primary care provider, Anh Spivey, within 7 days  for hospital follow- up. Follow up with podiatry next week    \"Has an appointment with your primary care provider been scheduled?\"   No (schedule appointment)  Assisted in scheduling   \"When you see the provider, I would recommend that you bring your medications with you.\"    Medications    \"Tell me what changed about your medicines when you discharged?\"    Changes to chronic meds?    0-1    \"What questions do you have about your medications?\"    None     New diagnoses of heart failure, COPD, diabetes, or MI?    No              Post Discharge Medication Reconciliation Status: discharge medications reconciled and changed, per note/orders.    Was MTM referral placed (*Make sure to put transitions as reason for referral)?   No    Call Summary    \"What questions or concerns do you have about your recent visit and your follow-up care?\"     none    \"If you have questions or things don't continue to improve, we encourage you contact us through the main clinic number (give number).  Even if the clinic is not open, triage nurses are available 24/7 to help you.     We would like you to know that our clinic has extended hours " "(provide information).  We also have urgent care (provide details on closest location and hours/contact info)\"      \"Thank you for your time and take care!\"             "

## 2020-07-29 NOTE — DISCHARGE SUMMARY
[facility]  Hospitalist Discharge Summary       Date of Admission:  7/27/2020  Date of Discharge:  7/28/2020   PM  Discharging Provider: Madi Pinzon MD      Discharge Diagnoses   # Left lower cellulitis 2/2 Left 5th toe diabetic foot infection and ulcer  # Chronic RLE ulcer  # DMT2  # BRIANNA on CPAP  # CKD stage III  # Hx of TIA, CAD, HTN, CHFpEF, BPH      Follow-ups Needed After Discharge   Follow-up Appointments     Follow-up and recommended labs and tests       Follow up with Dr. Azevedo at [facility] on   8/4/2020 at 3:15pm. For questions or concerns, call: [phone]         Follow-up and recommended labs and tests       Follow up with primary care provider, Anh Spivey, within 7 days   for hospital follow- up.     Follow up with podiatry next week             Unresulted Labs Ordered in the Past 30 Days of this Admission     Date and Time Order Name Status Description    7/27/2020 1813 Blood culture Preliminary     7/27/2020 1813 Blood culture Preliminary           Hospital Course     This is a 75-year-old male with a past medical history significant for CAD, hypertension, HFpEF with last EF of 50 to 55%, BRIANNA, BPH, TIA, diabetes mellitus with previous toe amputation, chronic right lower extremity ulcer.    Patient presented to the hospital with pain and redness in his left lower leg and ulcer on his left fifth toe.  Has a previous history of MSSA infection.  Patient was admitted to the hospital. Received IV ABx. He underwent MRI which did not show any signs of deep tissue infection. His cellulitis is improving. He underwent bedside debridement and abscess drainage of the fifth toe by podiatry.  No further plan for surgery per podiatry and cleared him for discharge.  Discussed with the patient my recommendation to monitor in the hospital overnight to watch his cellulitis, and given the elevated lactic acid, however he strongly desires discharge home today as he  takes care of his disabled wife.  He was given IV fluid bolus and his lactate normalized to 1.6.  He will discharge home on oral doxycycline and follow-up with his primary care provider and podiatry.     Consultations This Hospital Stay   PHARMACY TO DOSE VANCO  PHARMACY TO DOSE VANCO  PODIATRY IP CONSULT  ORTHOSIS EXTREMITY LOWER REFERRAL IP CONSULT    Code Status   Prior    Time Spent on this Encounter   I, Madi Pinzon MD, personally saw the patient today and spent greater than 30 minutes discharging this patient.       Madi Pinzon MD  [facility]  ______________________________________________________________________    Physical Exam   Weight: 256 lbs 1.6 oz  Patient is awake and alert, no acute distress.  Please refer to progress note from earlier today for details.       Primary Care Physician   Anh Spivey    Discharge Disposition   Discharged to home  Condition at discharge: Stable        Discharge Orders      Activity    Your activity upon discharge: weight bearing as tolerated in diabetic shoe right foot and DHII shoe left foot.     Wound care and dressings    Instructions to care for your wound at home:     Daily dressing changes both feet.     1.Wash feet with soap and water daily. Pat dry.   2. Apply silvadene cream to ulcers.   3. Apply a large bandaid or 2x2 gauze, gauze roll and tape.     Follow-up and recommended labs and tests     Follow up with Dr. Azevedo at [facility] on 8/4/2020 at 3:15pm. For questions or concerns, call: [phone]     Reason for your hospital stay    Diabetic foot infection     Follow-up and recommended labs and tests     Follow up with primary care provider, Anh Spivey, within 7 days for hospital follow- up.     Follow up with podiatry next week     Diet    Follow this diet upon discharge:     Moderate Consistent CHO Diet     Discharge Medications   Discharge Medication List as of 7/28/2020  6:02 PM       START taking these medications    Details   doxycycline hyclate (VIBRAMYCIN) 100 MG capsule Take 1 capsule (100 mg) by mouth 2 times daily for 10 days Take with large amounts of fluid, Disp-20 capsule,R-0, E-Prescribe         CONTINUE these medications which have NOT CHANGED    Details   amLODIPine (NORVASC) 5 MG tablet TAKE 1 TABLET BY MOUTH EVERY DAY, Disp-90 tablet,R-3, E-Prescribe      aspirin (ASA) 81 MG EC tablet Take 1 tablet (81 mg) by mouth daily, Disp-100 tablet, R-3, Local Print      atorvastatin 40 MG PO tablet Take 1 tablet (40 mg) by mouth daily, Disp-90 tablet, R-3, E-Prescribe      blood glucose (ACCU-CHEK SMARTVIEW) test strip USE TO TEST BLOOD SUGAR 1 TIMES DAILY OR AS DIRECTED., Disp-100 strip,R-3, E-Prescribe      blood glucose monitoring (ACCU-CHEK FASTCLIX) lancets USE LANCETS 2 TIMES DAILY, AS DIRECTED., Disp-200 each, R-1, E-Prescribe      Ferrous Gluconate 240 (27 Fe) MG TABS Take 1 tablet by mouth daily , Historical      finasteride (PROSCAR) 5 MG tablet Take 5 mg by mouth daily., 5 mg, Oral, DAILY, Until Discontinued, Historical      fluticasone (FLONASE) 50 MCG/ACT spray Spray 1 spray into both nostrils nightly as needed , Historical      isosorbide mononitrate 30 MG PO 24 hr tablet Take 1 tablet (30 mg) by mouth daily, Disp-90 tablet, R-3, E-Prescribe      labetalol (NORMODYNE) 200 MG tablet TAKE 1 TABLET (200 MG) BY MOUTH 2 TIMES DAILY, Disp-180 tablet,R-3, E-Prescribe      lisinopril 10 MG PO tablet Take 1 tablet (10 mg) by mouth daily, Disp-90 tablet, R-1, E-Prescribe      loratadine (CLARITIN) 10 MG tablet Take 10 mg by mouth nightly as needed , Historical      metFORMIN (GLUCOPHAGE) 1000 MG tablet Take 1 tablet (1,000 mg) by mouth 2 times daily (with meals), Disp-180 tablet,R-3, E-Prescribe      Multiple Vitamins-Minerals (MULTIVITAMIN ADULTS PO) Take 1 tablet by mouth daily , Historical      order for DME Diabetic shoes and multidensity inserts with offloading under right  "interphalangeal joint.Disp-3 Device,R-0, Local Print      tamsulosin (FLOMAX) 0.4 MG 24 hr capsule Take 1 capsule by mouth daily., 1 capsule, Oral, DAILY, Until Discontinued, Historical      torsemide (DEMADEX) 20 MG tablet TAKE 1 TABLET BY MOUTH EVERY DAY, Disp-90 tablet,R-3, E-Prescribe           Allergies   Allergies   Allergen Reactions     Penicillins Anaphylaxis     \"anaphylactic\"     Sulfa Drugs      \"itchy rash, swelling of face and hands\"     Ancef [Cefazolin] Rash       Significant Results and Procedures   Results for orders placed or performed during the hospital encounter of 07/27/20   Foot XR, G/E 3 views, left    Narrative    EXAM: XR FOOT LT G/E 3 VW  LOCATION: [facility]  DATE/TIME: 7/27/2020 5:51 PM    INDICATION: Left pinky toe ulcer and cellulitis  COMPARISON: None.      Impression    IMPRESSION: Limited evaluation due to patient positioning. Soft tissue swelling throughout the foot. No definite evidence of acute osteomyelitis. Status post amputation of the third toe at the level of the MTP joint. Moderate first MTP joint degenerative   changes. Otherwise mild degenerative changes throughout the foot. Small calcaneal enthesophytes.   MR Foot Left w/o Contrast    Narrative    EXAM: MR FOOT LEFT W/O CONTRAST  LOCATION: [facility]  DATE/TIME: 7/27/2020 8:58 PM    INDICATION: Osteomyelitis suspected, foot swelling, diabetic  COMPARISON: Radiographs from earlier today  TECHNIQUE: Unenhanced.    FINDINGS: Limited evaluation due to patient positioning and motion artifact.    Nonspecific marrow edema involving the fourth distal phalanx. Otherwise no suspicious marrow edema to suggest acute osteomyelitis. No acute fracture or malalignment. Moderate to severe first and second MTP joint degenerative changes. Mild multifocal IP   joint degenerative changes. Status post amputation of the third toe at the level of the MTP joint. Subchondral cysts in the first metatarsal " head.    Moderate dorsal forefoot soft tissue edema. There is a small fluid collection superficially in the dorsal fifth toe which measures 2 cm in length.    Diffuse muscle atrophy in keeping with diabetic neuropathy. No definite tendon tear.        Impression    IMPRESSION:  1.  Moderate dorsal forefoot soft tissue edema is most concerning for cellulitis.  2.  The 2 cm fluid collection in the fifth toe dorsally may represent an abscess versus blister.  3.  Marrow edema involving the fourth distal phalanx is nonspecific and poorly evaluated due to patient positioning and motion artifact; osteomyelitis cannot be excluded.  4.  Other ancillary findings as described above.

## 2020-07-29 NOTE — TELEPHONE ENCOUNTER
IP F/U    Date: 7/28/20  Diagnosis: Cellulitis Of Left Toe  Is patient active in care coordination? No  Was patient in TCU? No    Next 5 appointments (look out 90 days)    Aug 04, 2020  3:15 PM CDT  Return Visit with Ayaka Azevedo DPM, Podiatry/Foot and Ankle Surgery  [facility] ([facility] [address] [address] [id number] [phone]

## 2020-08-04 ENCOUNTER — OFFICE VISIT (OUTPATIENT)
Dept: PODIATRY | Facility: CLINIC | Age: 75
End: 2020-08-04
Payer: COMMERCIAL

## 2020-08-04 VITALS
SYSTOLIC BLOOD PRESSURE: 112 MMHG | DIASTOLIC BLOOD PRESSURE: 66 MMHG | WEIGHT: 257 LBS | BODY MASS INDEX: 34.81 KG/M2 | HEIGHT: 72 IN

## 2020-08-04 DIAGNOSIS — E11.42 DIABETIC POLYNEUROPATHY ASSOCIATED WITH TYPE 2 DIABETES MELLITUS (H): Primary | ICD-10-CM

## 2020-08-04 DIAGNOSIS — M20.42 HAMMERTOE OF LEFT FOOT: ICD-10-CM

## 2020-08-04 DIAGNOSIS — L84 PRE-ULCERATIVE CALLUSES: ICD-10-CM

## 2020-08-04 DIAGNOSIS — L97.522 DIABETIC ULCER OF OTHER PART OF LEFT FOOT ASSOCIATED WITH TYPE 2 DIABETES MELLITUS, WITH FAT LAYER EXPOSED (H): ICD-10-CM

## 2020-08-04 DIAGNOSIS — R60.0 EDEMA OF BOTH LOWER EXTREMITIES: ICD-10-CM

## 2020-08-04 DIAGNOSIS — E11.621 DIABETIC ULCER OF OTHER PART OF LEFT FOOT ASSOCIATED WITH TYPE 2 DIABETES MELLITUS, WITH FAT LAYER EXPOSED (H): ICD-10-CM

## 2020-08-04 PROCEDURE — 99024 POSTOP FOLLOW-UP VISIT: CPT | Performed by: PODIATRIST

## 2020-08-04 PROCEDURE — 11042 DBRDMT SUBQ TIS 1ST 20SQCM/<: CPT | Mod: 78 | Performed by: PODIATRIST

## 2020-08-04 ASSESSMENT — MIFFLIN-ST. JEOR: SCORE: 1933.74

## 2020-08-04 NOTE — LETTER
8/4/2020         RE: Lance Ibrahim  [address] [place] [address]        Dear Colleague,    Thank you for referring your patient, Lance Ibrahim, to the [facility]. Please see a copy of my visit note below.    Podiatry / Foot and Ankle Surgery Progress Note    August 4, 2020    Subject: Patient was seen for follow up on ulcer to the left foot.  He notes that he has been applying Silvadene cream to the left fifth toe.  Denies fever, nausea, vomiting.  No concerns today.  Has been wearing his offloading shoe.  Also wearing his compression socks.     Objective:  Vitals: /66   Ht 1.829 m (6')   Wt 116.6 kg (257 lb)   BMI 34.86 kg/m    BMI= Body mass index is 34.86 kg/m .    A1C: 7.1 (7/2020)    General:  Patient is alert and orientated.  NAD.    Vascular:  DP and PT pulses are palpable.  significant Edema and varicosities noted.  CFT's < 3secs.  Skin temp is normal      Neuro:  Light and gross touch sensation is absent to feet.      Derm:  Full thickness ulcer to the dorsal left 5th proximal interphalangeal joint measures 1.0cm x 1.5cm x 0.1cm after debridement (same).  base of wound is granular. No redness, purulent drainage or malodor noted. copious amounts of clear serous drainage. new ulcer to the distal left 4th toe measures 0.8cm x 0.3cm x 0.2cm after debridement. Base of wounds granular. No redness or acute signs of infection noted.      Musculoskeletal:  Decrease arch height. Lateral deviation of the right and left halluxes.     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  Continue to wear the offloading shoe.  Also continue to apply Silvadene cream to both ulcers.  We will have him follow-up in 5 weeks. If he  develops systemic signs of infection, recommend going to Emergency department.      Procedure: After verbal consent, excisional debridement was performed on ulcers.  #15 blade was used to debride ulcer down to and including subcutaneous tissue. Bleeding controlled with light pressure.   No drainage noted.  No anesthesia was used due to neuropathy. Dry dressing applied to foot.  Patient tolerated procedure well.     Ayaka Azevedo DPM, Podiatry/Foot and Ankle Surgery    Weight management plan: Patient was referred to their PCP to discuss a diet and exercise plan.      Again, thank you for allowing me to participate in the care of your patient.        Sincerely,        Ayaka Azevedo DPM, Podiatry/Foot and Ankle Surgery

## 2020-08-04 NOTE — PATIENT INSTRUCTIONS
"Thank you for choosing [facility] / [facility] Ankle Surgery!    DR. RANDHAWA'S CLINIC:  [facility]   [id number]    [address]   [place] [address]   [phone] / [phone]      SCHEDULE SURGERY: [phone]   BILLING QUESTIONS: [phone]   AFTER HOURS: [phone]   APPOINTMENTS: [phone]   CONSUMER PRICE LINE: [phone]      Follow up: 4-5 weeks  Diagnosis: ulcers left foot.   Next steps: silvadene cream to toes daily    Please read through the following handouts and if you have any questions, please feel free to call us or send a enosiX message!    DIABETES AND YOUR FEET  Diabetes can result in several problems in the feet including ulcers (open sores) and amputations. Two of the most important reasons why people develop foot problems when they have diabetes is : 1. Neuropathy (loss of feeling)  2. Vascular disease (loss or decrease of blood flow).    Neuropathy is a term used to describe a loss of nerve function.  Patients with diabetes are at risk of developing neuropathy if their sugars continue to run high and are above the normal value. One theory for neuropathy is that the \"extra\" sugar in the body enters the nerves and is broken down. These by-products build up in the nerve causing it to swell and impairing nerve function. Often times, this can be prevented by controlling your sugars, dieting and exercise.    When a person develops neuropathy, they usually begin to feel numbness or tingling in their feet and sometime in their legs.  Other symptoms may include painful burning or hot feet, tingling or feeling like insects or ants are crawling on your feet or legs.  If the diabetes is sever and the sugars run high for long periods of time, neuropathy can also occur in the hands.    Vascular disease  is a term used to describe a loss or decrease in circulation (blood flow). There is a problem in getting blood and oxygen to areas that need it. Similar to " neuropathy, sugars can build up in the walls of the arteries (blood vessels) and cause them to become swollen, thickened and hardened. This decreases the amount of blood that can go to an area that needs it. Though this is common in the legs of diabetic patients, it can also affect other arteries (blood vessels) in the body such as in the heart and eyes.    In the legs, vascular disease usually results in cramping. Patients who develop leg cramps after walking the same distance every time (i.e. One block, half a mile, ect.) need to let their doctors know so that their circulation may be checked. Cramps causing severe pain in the feet and/or legs while sleeping and the cramps go away when you stand or hang your legs off the side of the bed, may also be a sign of poor blood circulation.  Occasional cramping in cold weather or on rare occasions with activity may not be due to poor circulation, but you should inform your doctor.    PREVENTION OF THESE DISEASES  The key to prevention is good blood sugar control. Poor blood sugar control is a big reason many of these problems start. Physical activity (exercise) is a very good way to help decrease your blood sugars. Exercise can lower your blood sugar, blood pressure, and cholesterol. It also reduces your risk for heart disease and stroke, relieves stress, and strengthens your heart, muscles and bones.  In addition, regular activity helps insulin work better, improves your blood circulation, and keeps your joints flexible. If you're trying to lose weight, a combination of exercise and wise food choices can help you reach your target weight and maintain it.      PAIN MANAGEMENT  1.Blood Sugar Control - Most important  2. Medications such as:  Amytriptylline, duloxetine, gabapentin, lyrica, tramadol  3. Nutritional therapy:  Vitamin B6 (100mg daily), Vitamin B12 (75mcg daily), Vitamin D 2000 IU daily), Alpha-Lipoic Acid (600-1800mg daily), Acetyl-L-Carnitine (500-1000mg TID,  L-methyl folate (1500mcg daily)    ** Metformin can block Vitamin B6 and B12 so it is important to supplement**    FOOT CARE RECOMMENDATIONS   1. Wash your feet with lukewarm water and a mild soap and then dry them thoroughly, especially between the toes.     2. Examine your feet daily looking for cuts, corns, blisters, cracks, ect, especially after wearing new shoes. Make sure to look between your toes. If you cannot see the bottom of your feet, set a mirror on the floor and hold your foot over it, or ask a spouse, friend or family member to examine your feet for you. Contact your doctor immediately if new problems are noted or if sores are not healing.     3. Immediately apply moisturizer to the tops and bottoms of your feet, avoiding areas between the toes. Hand lotion (Intesive Care, Marta, Eucerin, Neutrogena, Curel, ect) is sufficient unless your doctor prescribes a medicated lotion. Apply sunscreen to your feet when going swimming outside.     4. Use clean comfortable shoes, wear white socks (if you have any bleeding or drainage, you will see it on white socks). Socks should not have thick seams or cut off the circulation around the leg. Break in new shoes slowly and rotate with older shoes until broken in. Check the inside of your shoes with your hand to look for areas of irritation or objects that may have fallen into your shoes.       5. Keep slippers by the side of your bed for use during the night.     6.  Shoes should be fitted by a professional and should not cause areas of irritation.  Check your feet regularly when wearing a new pair of shoes and replace them as needed.     7.  Talk to your doctor about proper exercise. Exercise and stretching stimulate blood flow to your feet and maintain proper glucose levels.     8.  Monitor your blood glucose level as instructed by your doctor. Notify your doctor immediately if your blood sugar is abnormally high or low.    9. Cut your nails straight across, but  then gently round any sharp edges with a cardboard nail file. If you have neuropathy, peripheral vascular disease or cannot see that well to trim your own toenails contact [facility] ([phone]) or [facility] ([phone]).      THINGS TO AVOID DOING   1.  Do not soak your feet if you have an open sore. Use only lukewarm water and always check the temperature with your hand as hot water can easily burn your feet.       2.  Never use a hot water bottle or heating pad on your feet. Also do not apply cold compresses to your feet. With decreased sensation, you could burn or freeze your feet.       3.  Do not apply any of these to your feet:    -  Over the counter medicine for corns or warts    -  Harsh chemicals like boric acid    -  Do not self-treat corns, cuts, blisters or infections. Always consult your doctor.       4.  Do not wear sandals, slippers or walk barefoot, especially on hot sand or concrete or other harsh surfaces.     5.  If you smoke, stop!!!        FOOT ULCER (WOUND) EDUCATION  Ulceration ofthe foot involves a break or hole in the skin. Skin is our best protection against infection. Skin is quite durable, however, the underlying tissues are fragile. For this reason, the wound is likely to deepen rapidly. Deep wounds usually get infected and require amputation. Prompt healing is therefore essential to avoid limb loss.     Foot ulcers do not heal without intervention. Walking on the foot and living your normal life is not typically compatible with healing the sore. Successful healing will require several months of significant alteration of your daily activities.   Ulcer complications frequently develop. This primarily includes infection of skin, which then spreads deep into your joints, bones and tendons. Spreading infection may travel up your leg and into other parts of your body. Deep infection is usually treated with amputation ofpart ofyour foot or your leg. Signs of infection include fever,  chills, nausea, vomiting, erratic blood sugars, local redness, pus, strong odor and localized warmth. Signs of infection should be taken seriously. Prompt evaluation in the clinic or hospital emergency room is required.   Ulcer treatment requires debridement or surgical removal of devitalized tissue. Your doctor will trim away callused, moistened, unhealthy tissue from the wound surface and margin. This helps to clean the wound and allows proper inspection. Debridement also stimulates healing even though the wound originally appears larger. Expect some bleeding with each debridement. You will be given instruction regarding wound bandaging. This often includes ointment and gauze. Avoid tape directly on the skin. Hand washing is essential since most infections will come from your fingertips. Ulcer care requires a no touch technique. Your fingers should not touch the margin or base of the wound.    HELPFUL HEALING TIPS:  1. Debridement: Getting rid of bad tissue makes way for good tissue to promote healing  2. Addressing Foot Deformities: Hammertoes and bunions can cause increased pressure  3. Pressure Reduction: If pressure remains to the wound, it won't heal  4. Good Pulses: If bloodflow is not getting to the foot, the ulcer will not heal  5. Good Nutrition: If you are not getting proper nutrition your body can't heal.Protein! At least 90g a day.  Supplements are a good way to help get this, such as Antonio, Glucerna, Ensure. Also taking 5000units of Vitamin D a day.   6. Infection Control: Keep the ulcer clean with wound cleanser. DO NOT SOAK IT!  7. Moisture Control: Keep edema down and make sure that drainage is getting pulled away from the ulcer    IMPORTANCE OF DEBRIDEMENT    Reduces bioburden to help control or reduce infection. Even if an ulcer is not  infected,  the bacterial bioburden causes increased local inflammation.    Allows more accurate visualization of the wound base and edges, which allows for more  precise staging.    Removes necrotic/non-viable tissue, which impedes wound healing, causes protein loss and can be a nidus for infection.    Stimulates new circulation (angiogenesis) and allows adequate oxygen delivery to the wound.    Removes undermining and tunneling, and may help reduce abscess formation.    Releases healing growth factors at the edge of the wound.    Prepares the wound bed by leaving only tissues that are capable of regenerating.            Lance to follow up with Primary Care provider regarding elevated blood pressure. (if equal or greater than 140/90)    BODY WEIGHT AND YOUR FEET  The following information is included in the after visit summary for all patients. Body weight can be a sensitive issue to discuss in clinic, but we think the following information is very important. Although we focus on the feet and ankles, we do support the overall health of our patients. Many things can cause foot and ankle problems. Foot structure, activity level, foot mechanics and injuries are common causes of pain. One very important issue that often goes unmentioned, is body weight. Extra weight can cause increased stress on muscles, ligaments, bones and tendons. Sometimes just a few extra pounds is all it takes to put one over her/his threshold. Without reducing that stress, it can be difficult to alleviate pain. As Foot & Ankle specialists, our job is addressing the lower extremity problem and possible causes. Regarding extra body weight, we encourage patients to discuss diet and weight management plans with their primary care doctors. It is this team approach that gives you the best opportunity for pain relief and getting you back on your feet. [facility] has a Comprehensive Weight Management Program. This program includes counseling, education, non-surgical and surgical approaches to weight loss. If you are interested in learning more either talk to you primary care provider or call [phone].

## 2020-08-04 NOTE — PROGRESS NOTES
Podiatry / Foot and Ankle Surgery Progress Note    August 4, 2020    Subject: Patient was seen for follow up on ulcer to the left foot.  He notes that he has been applying Silvadene cream to the left fifth toe.  Denies fever, nausea, vomiting.  No concerns today.  Has been wearing his offloading shoe.  Also wearing his compression socks.     Objective:  Vitals: /66   Ht 1.829 m (6')   Wt 116.6 kg (257 lb)   BMI 34.86 kg/m    BMI= Body mass index is 34.86 kg/m .    A1C: 7.1 (7/2020)    General:  Patient is alert and orientated.  NAD.    Vascular:  DP and PT pulses are palpable.  significant Edema and varicosities noted.  CFT's < 3secs.  Skin temp is normal      Neuro:  Light and gross touch sensation is absent to feet.      Derm:  Full thickness ulcer to the dorsal left 5th proximal interphalangeal joint measures 1.0cm x 1.5cm x 0.1cm after debridement (same).  base of wound is granular. No redness, purulent drainage or malodor noted. copious amounts of clear serous drainage. new ulcer to the distal left 4th toe measures 0.8cm x 0.3cm x 0.2cm after debridement. Base of wounds granular. No redness or acute signs of infection noted.      Musculoskeletal:  Decrease arch height. Lateral deviation of the right and left halluxes.     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  Continue to wear the offloading shoe.  Also continue to apply Silvadene cream to both ulcers.  We will have him follow-up in 5 weeks. If he develops systemic signs of infection, recommend going to Emergency department.      Procedure: After verbal consent, excisional debridement was performed on ulcers.  #15 blade was used to debride ulcer down to and including subcutaneous  tissue. Bleeding controlled with light pressure.   No drainage noted.  No anesthesia was used due to neuropathy. Dry dressing applied to foot.  Patient tolerated procedure well.     Ayaka Azevedo DPM, Podiatry/Foot and Ankle Surgery    Weight management plan: Patient was referred to their PCP to discuss a diet and exercise plan.

## 2020-08-06 ENCOUNTER — VIRTUAL VISIT (OUTPATIENT)
Dept: INTERNAL MEDICINE | Facility: CLINIC | Age: 75
End: 2020-08-06
Payer: COMMERCIAL

## 2020-08-06 DIAGNOSIS — L97.501 ULCER OF FOOT, LIMITED TO BREAKDOWN OF SKIN, UNSPECIFIED LATERALITY (H): Primary | ICD-10-CM

## 2020-08-06 PROCEDURE — 99213 OFFICE O/P EST LOW 20 MIN: CPT | Mod: 95 | Performed by: NURSE PRACTITIONER

## 2020-08-06 NOTE — PROGRESS NOTES
"Lance Ibrahim is a 75 year old male who is being evaluated via a billable video visit.      The patient has been notified of following:     \"This video visit will be conducted via a call between you and your physician/provider. We have found that certain health care needs can be provided without the need for an in-person physical exam.  This service lets us provide the care you need with a video conversation.  If a prescription is necessary we can send it directly to your pharmacy.  If lab work is needed we can place an order for that and you can then stop by our lab to have the test done at a later time.    Video visits are billed at different rates depending on your insurance coverage.  Please reach out to your insurance provider with any questions.    If during the course of the call the physician/provider feels a video visit is not appropriate, you will not be charged for this service.\"    Patient has given verbal consent for Video visit? Yes  How would you like to obtain your AVS? MyChart  If you are dropped from the video visit, the video invite should be resent to: Text to cell phone: [phone]  Will anyone else be joining your video visit? No    Subjective     Lance Ibrahim is a 75 year old male who presents today via video visit for the following health issues:    [facility]      Hospital Follow-up Visit:    Hospital/Nursing Home/IP Rehab Facility: [facility]  Date of Admission: 07/27/200  Date of Discharge: 07/28/2020  Reason(s) for Admission: cellulitis left foot 5th toe      Was your hospitalization related to COVID-19? No   Problems taking medications regularly:  None  Medication changes since discharge: None  Problems adhering to non-medication therapy:  None    Summary of hospitalization:  [facility] discharge summary reviewed  Diagnostic Tests/Treatments reviewed.  Follow up needed: none  Other Healthcare Providers Involved in Patient s Care:         Specialist appointment - " podiatry  Update since discharge: stable.       Post Discharge Medication Reconciliation: discharge medications reconciled, continue medications without change.  Plan of care communicated with patient                   Video Start Time: 1102        Patient Active Problem List   Diagnosis     Ventral hernia     Chronic rhinitis     Hypertrophy of prostate with urinary obstruction     Transient cerebral ischemia     Essential hypertension     CARDIOVASCULAR SCREENING; LDL GOAL LESS THAN 100     Gout     Type 2 diabetes, HbA1c goal < 7% (H)     Cervicalgia     Amputated toe, left (H)     Type 2 diabetes mellitus with peripheral vascular disease (H)     Total knee replacement status     Dyspnea on exertion     Chest pain, unspecified type     Cardiomyopathy, unspecified type (H)     Shortness of breath     Renal colic     Obesity (BMI 35.0-39.9) with comorbidity (H)     CKD (chronic kidney disease) stage 3, GFR 30-59 ml/min (H)     Ulcerated, foot (H)     Past Surgical History:   Procedure Laterality Date     AMPUTATE TOE(S) Left 10/15/2018    Procedure: AMPUTATE TOE(S);  Left third toe amputation;  Surgeon: Ayaka Azevedo DPM, Podiatry/Foot and Ankle Surgery;  Location: [facility] OR     ARTHROPLASTY KNEE Right 12/7/2018    Procedure: Right total knee arthroplasty;  Surgeon: Issa Cunha MD;  Location: [facility] OR     C NONSPECIFIC PROCEDURE  1985    Diastasis recti repair     C NONSPECIFIC PROCEDURE  1987    Ventral hernia repair     C NONSPECIFIC PROCEDURE      ORIF of left shoulder     COLONOSCOPY  3/9/2013    Procedure: COLONOSCOPY;  COLONOSCOPY;  Surgeon: Chau Hogan MD;  Location:  GI     CV HEART CATHETERIZATION WITH POSSIBLE INTERVENTION Left 3/5/2019    Procedure: Coronary Angiogram;  Surgeon: Nas Linda MD;  Location:  HEART CARDIAC CATH LAB     EYE SURGERY  03/13/2017    left eye cataract     EYE SURGERY      Right eye cataract     FOOT SURGERY  4/2013    cyst removal      HERNIA REPAIR  7/13/2004     ventral        Social History     Tobacco Use     Smoking status: Former Smoker     Packs/day: 0.00     Last attempt to quit: 3/17/1993     Years since quittin.4     Smokeless tobacco: Never Used   Substance Use Topics     Alcohol use: Not Currently     Family History   Problem Relation Age of Onset     Family History Negative Mother          86 yo     Cancer Father          74 yo brain     Diabetes Maternal Grandfather          89 yo     Alcohol/Drug Paternal Grandfather              Colon Cancer No family hx of          Current Outpatient Medications   Medication Sig Dispense Refill     amLODIPine (NORVASC) 5 MG tablet TAKE 1 TABLET BY MOUTH EVERY DAY 90 tablet 3     aspirin (ASA) 81 MG EC tablet Take 1 tablet (81 mg) by mouth daily 100 tablet 3     atorvastatin 40 MG PO tablet Take 1 tablet (40 mg) by mouth daily 90 tablet 3     blood glucose (ACCU-CHEK SMARTVIEW) test strip USE TO TEST BLOOD SUGAR 1 TIMES DAILY OR AS DIRECTED. 100 strip 3     blood glucose monitoring (ACCU-CHEK FASTCLIX) lancets USE LANCETS 2 TIMES DAILY, AS DIRECTED. 200 each 1     diclofenac (VOLTAREN) 1 % topical gel Place onto the skin 4 times daily       doxycycline hyclate (VIBRAMYCIN) 100 MG capsule Take 1 capsule (100 mg) by mouth 2 times daily for 10 days Take with large amounts of fluid 20 capsule 0     Ferrous Gluconate 240 (27 Fe) MG TABS Take 1 tablet by mouth daily        finasteride (PROSCAR) 5 MG tablet Take 5 mg by mouth daily.       fluticasone (FLONASE) 50 MCG/ACT spray Spray 1 spray into both nostrils nightly as needed        isosorbide mononitrate 30 MG PO 24 hr tablet Take 1 tablet (30 mg) by mouth daily 90 tablet 3     labetalol (NORMODYNE) 200 MG tablet TAKE 1 TABLET (200 MG) BY MOUTH 2 TIMES DAILY 180 tablet 3     lisinopril 10 MG PO tablet Take 1 tablet (10 mg) by mouth daily 90 tablet 1     loratadine (CLARITIN) 10 MG tablet Take 10 mg by mouth nightly as needed        metFORMIN  (GLUCOPHAGE) 1000 MG tablet Take 1 tablet (1,000 mg) by mouth 2 times daily (with meals) 180 tablet 3     Multiple Vitamins-Minerals (MULTIVITAMIN ADULTS PO) Take 1 tablet by mouth daily        order for DME Diabetic shoes and multidensity inserts with offloading under right interphalangeal joint. 3 Device 0     tamsulosin (FLOMAX) 0.4 MG 24 hr capsule Take 1 capsule by mouth daily.       torsemide (DEMADEX) 20 MG tablet TAKE 1 TABLET BY MOUTH EVERY DAY 90 tablet 3     Recent Labs   Lab Test 07/28/20  0626 07/27/20  1734  02/19/20  0741  01/24/20  1359  06/25/19  0754 03/25/19  1647  05/01/18  1332 10/07/17  1036 05/15/17  1714   A1C  --  7.1*  --  7.7*  --   --   --   --  6.3*   < >  --  6.3* 6.3*   LDL  --   --   --   --   --   --   --  43  --   --   --  72 86   HDL  --   --   --   --   --   --   --  63  --   --   --  65 53   TRIG  --   --   --   --   --   --   --  65  --   --   --  68 99   ALT 27  --   --   --   --  37  --  24  --   --  30  --   --    CR 1.19 1.30*   < >  --    < > 1.78*   < > Canceled, Test credited 1.50*   < > 0.93 0.88 0.88   GFRESTIMATED 59* 53*   < >  --    < > 36*   < > Canceled, Test credited 45*   < > 80 85 85   GFRESTBLACK 68 61   < >  --    < > 42*   < > Canceled, Test credited 52*   < > >90 >90 >90  African American GFR Calc     POTASSIUM 4.5 3.9   < >  --    < > 5.0   < > Canceled, Test credited 4.7   < > 4.4 4.3 3.9   TSH  --   --   --   --   --  3.24  --   --   --   --  2.19  --   --     < > = values in this interval not displayed.        Reviewed and updated as needed this visit by Provider         Review of Systems   Constitutional, HEENT, cardiovascular, pulmonary, gi and gu systems are negative, except as otherwise noted.      Objective             Physical Exam     GENERAL: Healthy, alert and no distress  RESP: No audible wheeze, cough, or visible cyanosis.  No visible retractions or increased work of breathing.    PSYCH: Mentation appears normal, affect normal/bright,  judgement and insight intact, normal speech and appearance well-groomed.              Assessment & Plan       ICD-10-CM    1. Ulcer of foot, limited to breakdown of skin, unspecified laterality (H)  L97.501           The current medical regimen is effective;  continue present plan and medications.  F/u podiatry as planned.    Anh Spivey NP  [facility]      Video-Visit Details    Type of service:  Video Visit    Video End Time:1109    Originating Location (pt. Location): Home    Distant Location (provider location):  [facility]     Platform used for Video Visit: Myriam    No follow-ups on file.       Anh Spivey NP

## 2020-08-10 ENCOUNTER — TRANSFERRED RECORDS (OUTPATIENT)
Dept: HEALTH INFORMATION MANAGEMENT | Facility: CLINIC | Age: 75
End: 2020-08-10

## 2020-08-27 ENCOUNTER — TRANSFERRED RECORDS (OUTPATIENT)
Dept: HEALTH INFORMATION MANAGEMENT | Facility: CLINIC | Age: 75
End: 2020-08-27

## 2020-09-01 ENCOUNTER — TELEPHONE (OUTPATIENT)
Dept: INTERNAL MEDICINE | Facility: CLINIC | Age: 75
End: 2020-09-01

## 2020-09-01 DIAGNOSIS — E11.9 TYPE 2 DIABETES MELLITUS WITHOUT COMPLICATION, WITHOUT LONG-TERM CURRENT USE OF INSULIN (H): ICD-10-CM

## 2020-09-01 DIAGNOSIS — E11.51 TYPE 2 DIABETES MELLITUS WITH PERIPHERAL VASCULAR DISEASE (H): Primary | ICD-10-CM

## 2020-09-01 RX ORDER — BLOOD SUGAR DIAGNOSTIC
STRIP MISCELLANEOUS
Qty: 100 STRIP | Refills: 3 | Status: SHIPPED | OUTPATIENT
Start: 2020-09-01 | End: 2020-10-07

## 2020-09-01 NOTE — TELEPHONE ENCOUNTER
"Requested Prescriptions   Pending Prescriptions Disp Refills     blood glucose (ACCU-CHEK SMARTVIEW) test strip  Last Written Prescription Date:  03/22/20  Last Fill Quantity: 100,  # refills: 3   Last office visit: 2/19/2020 with prescribing provider:  JAS 08/06/20   Future Office Visit:   Next 5 appointments (look out 90 days)    Sep 09, 2020  4:00 PM CDT  Return Visit with Ayaka Azevedo DPM, Podiatry/Foot and Ankle Surgery  [facility] ([facility]) [address]  [address]  [facility] [address]  [phone]                100 strip 3     Sig: USE TO TEST BLOOD SUGAR 1 TIMES DAILY OR AS DIRECTED.       Diabetic Supplies Protocol Passed - 9/1/2020 11:05 AM        Passed - Medication is active on med list        Passed - Patient is 18 years of age or older        Passed - Recent (6 mo) or future (30 days) visit within the authorizing provider's specialty     Patient had office visit in the last 6 months or has a visit in the next 30 days with authorizing provider.  See \"Patient Info\" tab in inbasket, or \"Choose Columns\" in Meds & Orders section of the refill encounter.                 "

## 2020-09-01 NOTE — TELEPHONE ENCOUNTER
Fax received from pharmacy requesting alternate to ACCU-CHEK SMARTVIEW Test Strip.  Brands covered are Freestyle, Precision Extra, One Touch Ultra Blue/Verio.  Please send new RX for Meter, Test Strips, and Lancets.

## 2020-09-01 NOTE — TELEPHONE ENCOUNTER
See covered meter and supplies below. Please fax in.     Freestyle Precision extra  One touch Ultra blue/verio

## 2020-09-08 ENCOUNTER — TELEPHONE (OUTPATIENT)
Dept: INTERNAL MEDICINE | Facility: CLINIC | Age: 75
End: 2020-09-08

## 2020-09-08 DIAGNOSIS — E11.9 TYPE 2 DIABETES MELLITUS WITHOUT COMPLICATION, WITHOUT LONG-TERM CURRENT USE OF INSULIN (H): Primary | ICD-10-CM

## 2020-09-08 NOTE — TELEPHONE ENCOUNTER
Fax received from [facility] for Lancets and Test Strips to go with new Meter given 09/02/20.

## 2020-09-09 ENCOUNTER — OFFICE VISIT (OUTPATIENT)
Dept: PODIATRY | Facility: CLINIC | Age: 75
End: 2020-09-09
Payer: COMMERCIAL

## 2020-09-09 VITALS
HEIGHT: 72 IN | DIASTOLIC BLOOD PRESSURE: 70 MMHG | SYSTOLIC BLOOD PRESSURE: 116 MMHG | BODY MASS INDEX: 34.81 KG/M2 | WEIGHT: 257 LBS

## 2020-09-09 DIAGNOSIS — M20.21 HALLUX RIGIDUS OF BOTH FEET: ICD-10-CM

## 2020-09-09 DIAGNOSIS — E11.51 TYPE 2 DIABETES MELLITUS WITH PERIPHERAL VASCULAR DISEASE (H): Primary | ICD-10-CM

## 2020-09-09 DIAGNOSIS — L97.512 CHRONIC ULCER OF RIGHT GREAT TOE WITH FAT LAYER EXPOSED (H): ICD-10-CM

## 2020-09-09 DIAGNOSIS — I87.2 EDEMA OF BOTH LOWER EXTREMITIES DUE TO PERIPHERAL VENOUS INSUFFICIENCY: ICD-10-CM

## 2020-09-09 DIAGNOSIS — E11.42 DIABETIC POLYNEUROPATHY ASSOCIATED WITH TYPE 2 DIABETES MELLITUS (H): ICD-10-CM

## 2020-09-09 DIAGNOSIS — M20.22 HALLUX RIGIDUS OF BOTH FEET: ICD-10-CM

## 2020-09-09 PROCEDURE — 11042 DBRDMT SUBQ TIS 1ST 20SQCM/<: CPT | Performed by: PODIATRIST

## 2020-09-09 PROCEDURE — 99213 OFFICE O/P EST LOW 20 MIN: CPT | Mod: 25 | Performed by: PODIATRIST

## 2020-09-09 ASSESSMENT — MIFFLIN-ST. JEOR: SCORE: 1933.74

## 2020-09-09 NOTE — LETTER
9/9/2020         RE: Lance Ibrahim  [address] [place] [address]        Dear Colleague,    Thank you for referring your patient, Lance Ibrahim, to the [facility] POD[facility]. Please see a copy of my visit note below.    Podiatry / Foot and Ankle Surgery Progress Note    September 9, 2020    Subject: Patient was seen for follow up on ulceration of the right great toe.  He has been doing Silvadene cream dressings every day.  Note is has been draining quite a bit of clear fluid.  Denies fever, nausea, vomiting.  Has not been wearing any offloading devices around the house and mostly wears sandals or is in socks.    Objective:  Vitals: /70   Ht 1.829 m (6')   Wt 116.6 kg (257 lb)   BMI 34.86 kg/m    BMI= Body mass index is 34.86 kg/m .    A1C: 7.1 (7/2020)     General:  Patient is alert and orientated.  NAD.     Vascular:  DP and PT pulses are palpable.  significant Edema and varicosities noted.  CFT's < 3secs.  Skin temp is normal      Neuro:  Light and gross touch sensation is absent to feet.      Derm:  Full thickness ulcer to the plantar aspect of the right IPJ.  After debridement this measures 1.5 cm x 0.8 cm x 0.3 cm.  Base of the wound is granular.  No purulent drainage, redness or signs of acute infection noted.  There is heavy clear serous drainage noted from the wound.     Musculoskeletal:  Decrease arch height. Lateral deviation of the right and left halluxes.  Significant decreased range of motion of the right first metatarsal phalangeal joint and inner phalangeal joint.     Assessment:     Type 2 diabetes mellitus with peripheral vascular disease (H)  Diabetic polyneuropathy associated with type 2 diabetes mellitus (H)  Chronic ulcer of right great toe with fat layer exposed (H)  Hallux rigidus of both feet  Edema of both lower extremities due to peripheral venous insufficiency     Plan: At this time the wound was debrided.  At this time we will switch from Silvadene cream to  Kellen.  We will order this through [employer].  The order was faxed today.  When he gets the Kellen he will stop using the Silvadene cream and cut a small piece of the foam put that on the wound and a large Band-Aid.  We also talked about offloading the foot.  Discussed that sandals and socks do not offload the ulcer.  Recommend that he be in a diabetic healing shoe.  He notes that he has 1 of these at home and is good to wear it.  His insert in his shoe was modified with felted foam to take pressure off of the ulcer area of the right great toe as well.  We will have him follow-up in 1 month for reassessment.  He was told to call with further questions or concerns.     Procedure: After verbal consent, excisional debridement was performed on ulcer.  #15 blade was used to debride ulcer down to and including subcutaneous tissue. Bleeding controlled with light pressure.   No drainage noted.  No anesthesia was used due to neuropathy. Dry dressing applied to foot.  Patient tolerated procedure well.         Ayaka Azevedo DPM, Podiatry/Foot and Ankle Surgery    Weight management plan: Patient was referred to their PCP to discuss a diet and exercise plan.      Again, thank you for allowing me to participate in the care of your patient.        Sincerely,        Ayaka Azevedo DPM, Podiatry/Foot and Ankle Surgery

## 2020-09-09 NOTE — PROGRESS NOTES
Podiatry / Foot and Ankle Surgery Progress Note    September 9, 2020    Subject: Patient was seen for follow up on ulceration of the right great toe.  He has been doing Silvadene cream dressings every day.  Note is has been draining quite a bit of clear fluid.  Denies fever, nausea, vomiting.  Has not been wearing any offloading devices around the house and mostly wears sandals or is in socks.    Objective:  Vitals: /70   Ht 1.829 m (6')   Wt 116.6 kg (257 lb)   BMI 34.86 kg/m    BMI= Body mass index is 34.86 kg/m .    A1C: 7.1 (7/2020)     General:  Patient is alert and orientated.  NAD.     Vascular:  DP and PT pulses are palpable.  significant Edema and varicosities noted.  CFT's < 3secs.  Skin temp is normal      Neuro:  Light and gross touch sensation is absent to feet.      Derm:  Full thickness ulcer to the plantar aspect of the right IPJ.  After debridement this measures 1.5 cm x 0.8 cm x 0.3 cm.  Base of the wound is granular.  No purulent drainage, redness or signs of acute infection noted.  There is heavy clear serous drainage noted from the wound.     Musculoskeletal:  Decrease arch height. Lateral deviation of the right and left halluxes.  Significant decreased range of motion of the right first metatarsal phalangeal joint and inner phalangeal joint.     Assessment:     Type 2 diabetes mellitus with peripheral vascular disease (H)  Diabetic polyneuropathy associated with type 2 diabetes mellitus (H)  Chronic ulcer of right great toe with fat layer exposed (H)  Hallux rigidus of both feet  Edema of both lower extremities due to peripheral venous insufficiency     Plan: At this time the wound was debrided.  At this time we will switch from Silvadene cream to Kellen.  We will order this through [employer].  The order was faxed today.  When he gets the Kellen he will stop using the Silvadene cream and cut a small piece of the foam put that on the wound and a large Band-Aid.  We also talked about  offloading the foot.  Discussed that sandals and socks do not offload the ulcer.  Recommend that he be in a diabetic healing shoe.  He notes that he has 1 of these at home and is good to wear it.  His insert in his shoe was modified with felted foam to take pressure off of the ulcer area of the right great toe as well.  We will have him follow-up in 1 month for reassessment.  He was told to call with further questions or concerns.     Procedure: After verbal consent, excisional debridement was performed on ulcer.  #15 blade was used to debride ulcer down to and including subcutaneous tissue. Bleeding controlled with light pressure.   No drainage noted.  No anesthesia was used due to neuropathy. Dry dressing applied to foot.  Patient tolerated procedure well.         Ayaka Azevedo DPM, Podiatry/Foot and Ankle Surgery    Weight management plan: Patient was referred to their PCP to discuss a diet and exercise plan.

## 2020-10-07 ENCOUNTER — VIRTUAL VISIT (OUTPATIENT)
Dept: INTERNAL MEDICINE | Facility: CLINIC | Age: 75
End: 2020-10-07
Payer: COMMERCIAL

## 2020-10-07 DIAGNOSIS — M79.18 LUMBAR MUSCLE PAIN: Primary | ICD-10-CM

## 2020-10-07 PROCEDURE — 99213 OFFICE O/P EST LOW 20 MIN: CPT | Mod: 95 | Performed by: NURSE PRACTITIONER

## 2020-10-07 RX ORDER — CYCLOBENZAPRINE HCL 10 MG
10 TABLET ORAL 3 TIMES DAILY PRN
Qty: 30 TABLET | Refills: 0 | Status: SHIPPED | OUTPATIENT
Start: 2020-10-07 | End: 2021-04-26

## 2020-10-07 RX ORDER — TRAMADOL HYDROCHLORIDE 50 MG/1
50 TABLET ORAL EVERY 6 HOURS PRN
Qty: 20 TABLET | Refills: 0 | Status: SHIPPED | OUTPATIENT
Start: 2020-10-07 | End: 2020-10-10

## 2020-10-07 NOTE — PROGRESS NOTES
"Lance Ibrahim is a 75 year old male who is being evaluated via a billable video visit.      The patient has been notified of following:     \"This video visit will be conducted via a call between you and your physician/provider. We have found that certain health care needs can be provided without the need for an in-person physical exam.  This service lets us provide the care you need with a video conversation.  If a prescription is necessary we can send it directly to your pharmacy.  If lab work is needed we can place an order for that and you can then stop by our lab to have the test done at a later time.    Video visits are billed at different rates depending on your insurance coverage.  Please reach out to your insurance provider with any questions.    If during the course of the call the physician/provider feels a video visit is not appropriate, you will not be charged for this service.\"    Patient has given verbal consent for Video visit? Yes  How would you like to obtain your AVS? MyChart  If you are dropped from the video visit, the video invite should be resent to: Text to cell phone: [phone]-  Will anyone else be joining your video visit? No  Subjective   Lance Ibrahim is a 75 year old male who presents today via video visit for the following health issues:    HPI              Video Start Time: 1500    Back Pain  Onset/Duration: 5 days  Description:   Location of pain: low back bilateral  Character of pain: sharp, stabbing and waxing and waning  Pain radiation: none  New numbness or weakness in legs, not attributed to pain: no   Intensity: moderate  Progression of Symptoms: same  History:   Specific cause: none  Pain interferes with job: not applicable  History of back problems: recurrent self limited episodes of low back pain in the past  Any previous MRI or X-rays: None  Sees a specialist for back pain: No  Alleviating factors:   Improved by: none    Precipitating factors:  Worsened by: Lifting, Bending and " Standing  Therapies tried and outcome: acetaminophen (Tylenol), chiropractor, heat, NSAIDs and rest    Accompanying Signs & Symptoms:  Risk of Fracture: None  Risk of Cauda Equina: None  Risk of Infection: None  Risk of Cancer: None  Risk of Ankylosing Spondylitis: Onset at age <35, male, AND morning back stiffness  no           Review of Systems   Constitutional, HEENT, cardiovascular, pulmonary, gi and gu systems are negative, except as otherwise noted.      Objective           Vitals:  No vitals were obtained today due to virtual visit.    Physical Exam     RESP: No audible wheeze, cough, or visible cyanosis.  No visible retractions or increased work of breathing.    PSYCH: Mentation appears normal, affect normal/bright, judgement and insight intact, normal speech and appearance well-groomed.              Assessment & Plan     Lumbar muscle pain    - cyclobenzaprine (FLEXERIL) 10 MG tablet; Take 1 tablet (10 mg) by mouth 3 times daily as needed for muscle spasms  - traMADol (ULTRAM) 50 MG tablet; Take 1 tablet (50 mg) by mouth every 6 hours as needed for severe pain        Heat, NSAIDs, lido patch, chiropractor  Call if not improving 7-10 days    Anh Spivey NP  [facility]      Video-Visit Details    Type of service:  Video Visit    Video End Time:1512      Originating Location (pt. Location): Home    Distant Location (provider location):  [facility]     Platform used for Video Visit: Myriam

## 2020-10-08 ENCOUNTER — TRANSFERRED RECORDS (OUTPATIENT)
Dept: HEALTH INFORMATION MANAGEMENT | Facility: CLINIC | Age: 75
End: 2020-10-08

## 2020-10-13 ENCOUNTER — OFFICE VISIT (OUTPATIENT)
Dept: INTERNAL MEDICINE | Facility: CLINIC | Age: 75
End: 2020-10-13
Payer: COMMERCIAL

## 2020-10-13 VITALS
HEIGHT: 72 IN | HEART RATE: 100 BPM | OXYGEN SATURATION: 97 % | TEMPERATURE: 97.8 F | RESPIRATION RATE: 16 BRPM | SYSTOLIC BLOOD PRESSURE: 114 MMHG | BODY MASS INDEX: 35.54 KG/M2 | DIASTOLIC BLOOD PRESSURE: 63 MMHG | WEIGHT: 262.4 LBS

## 2020-10-13 DIAGNOSIS — R21 RASH: Primary | ICD-10-CM

## 2020-10-13 PROCEDURE — 99213 OFFICE O/P EST LOW 20 MIN: CPT | Performed by: INTERNAL MEDICINE

## 2020-10-13 RX ORDER — TRAMADOL HYDROCHLORIDE 50 MG/1
50 TABLET ORAL EVERY 6 HOURS PRN
Qty: 10 TABLET | Refills: 0 | COMMUNITY
Start: 2020-10-13 | End: 2020-12-08

## 2020-10-13 RX ORDER — CLINDAMYCIN HCL 300 MG
300 CAPSULE ORAL 3 TIMES DAILY
COMMUNITY
Start: 2020-10-08 | End: 2020-12-20

## 2020-10-13 ASSESSMENT — MIFFLIN-ST. JEOR: SCORE: 1958.24

## 2020-10-13 NOTE — PROGRESS NOTES
Subjective     Lance Ibrahim is a 75 year old male who presents to clinic today for the following health issues:    HPI         He presents today with concerns about a red patch on his right thigh.  He had a previous history of a sore on his lower leg and developed a cellulitis so he wants to be sure this is not developing into infection.  The patch developed over the past few days.  He reports it is not particularly itchy.  There is no pain.      Patient Active Problem List   Diagnosis     Ventral hernia     Chronic rhinitis     Hypertrophy of prostate with urinary obstruction     Transient cerebral ischemia     Essential hypertension     CARDIOVASCULAR SCREENING; LDL GOAL LESS THAN 100     Gout     Type 2 diabetes, HbA1c goal < 7% (H)     Cervicalgia     Amputated toe, left (H)     Type 2 diabetes mellitus with peripheral vascular disease (H)     Total knee replacement status     Dyspnea on exertion     Chest pain, unspecified type     Cardiomyopathy, unspecified type (H)     Shortness of breath     Renal colic     Obesity (BMI 35.0-39.9) with comorbidity (H)     CKD (chronic kidney disease) stage 3, GFR 30-59 ml/min     Ulcerated, foot (H)     Current Outpatient Medications   Medication Sig Dispense Refill     amLODIPine (NORVASC) 5 MG tablet TAKE 1 TABLET BY MOUTH EVERY DAY 90 tablet 3     aspirin (ASA) 81 MG EC tablet Take 1 tablet (81 mg) by mouth daily 100 tablet 3     atorvastatin 40 MG PO tablet Take 1 tablet (40 mg) by mouth daily 90 tablet 3     blood glucose (NO BRAND SPECIFIED) test strip Use to test blood sugar 2 times daily or as directed. 200 strip 1     blood glucose monitoring (NO BRAND SPECIFIED) meter device kit Use to test blood sugar bid times daily or as directed. 1 kit 0     cyclobenzaprine (FLEXERIL) 10 MG tablet Take 1 tablet (10 mg) by mouth 3 times daily as needed for muscle spasms 30 tablet 0     diclofenac (VOLTAREN) 1 % topical gel Place onto the skin 4 times daily       Ferrous  Gluconate 240 (27 Fe) MG TABS Take 1 tablet by mouth daily        finasteride (PROSCAR) 5 MG tablet Take 5 mg by mouth daily.       fluticasone (FLONASE) 50 MCG/ACT spray Spray 1 spray into both nostrils nightly as needed        isosorbide mononitrate 30 MG PO 24 hr tablet Take 1 tablet (30 mg) by mouth daily 90 tablet 3     labetalol (NORMODYNE) 200 MG tablet TAKE 1 TABLET (200 MG) BY MOUTH 2 TIMES DAILY 180 tablet 3     lisinopril (ZESTRIL) 10 MG tablet TAKE 1 TABLET BY MOUTH EVERY DAY 90 tablet 0     loratadine (CLARITIN) 10 MG tablet Take 10 mg by mouth nightly as needed        metFORMIN (GLUCOPHAGE) 1000 MG tablet Take 1 tablet (1,000 mg) by mouth 2 times daily (with meals) 180 tablet 3     Multiple Vitamins-Minerals (MULTIVITAMIN ADULTS PO) Take 1 tablet by mouth daily        order for DME Diabetic shoes and multidensity inserts with offloading under right interphalangeal joint. 3 Device 0     tamsulosin (FLOMAX) 0.4 MG 24 hr capsule Take 1 capsule by mouth daily.       torsemide (DEMADEX) 20 MG tablet TAKE 1 TABLET BY MOUTH EVERY DAY 90 tablet 3     traMADol (ULTRAM) 50 MG tablet Take 1 tablet (50 mg) by mouth every 6 hours as needed for severe pain 10 tablet 0     clindamycin (CLEOCIN) 300 MG capsule Take 300 mg by mouth 3 times daily        Social History     Tobacco Use     Smoking status: Former Smoker     Packs/day: 0.00     Quit date: 3/17/1993     Years since quittin.6     Smokeless tobacco: Never Used   Substance Use Topics     Alcohol use: Not Currently     Drug use: No        Review of Systems   No fever, chills, other rashes      Objective    /63 (BP Location: Left arm, Patient Position: Chair, Cuff Size: Adult Large)   Pulse 100   Temp 97.8  F (36.6  C) (Oral)   Resp 16   Ht 1.829 m (6')   Wt 119 kg (262 lb 6.4 oz)   SpO2 97%   BMI 35.59 kg/m    Body mass index is 35.59 kg/m .  Physical Exam     There is a patch of punctate red spots with some coalescence, not raised but very  slight scale with a few other tiny punctate red spots in the general area.  There are no open areas, no surrounding erythema or induration          Assessment & Plan     Rash  Reassured patient this is not cellulitis.  There is no actual open skin.  It looks like it could be a contact dermatitis though it is not really itchy.  Less likely to be an eczematous area, does not look fungal.  It could also be trauma related.  Recommend trial hydrocortisone cream.  Advised cellulitis would typically start to cause pink skin associated with some swelling and pain and tenderness spreading outward and advised to call.  If the rash is starting further, may need to see dermatology.        Return in about 3 months (around 1/20/2021) for Diabetes follow-up with Anh Spivey.    Shyla Barrett MD  [facility]

## 2020-11-15 DIAGNOSIS — M10.00 IDIOPATHIC GOUT, UNSPECIFIED CHRONICITY, UNSPECIFIED SITE: ICD-10-CM

## 2020-11-17 RX ORDER — COLCHICINE 0.6 MG/1
CAPSULE ORAL
Qty: 60 CAPSULE | Refills: 0 | Status: SHIPPED | OUTPATIENT
Start: 2020-11-17 | End: 2021-04-15

## 2020-11-17 NOTE — TELEPHONE ENCOUNTER
Routing refill request to provider for review/approval because:  Drug not active on patient's medication list: stopped by provider MA at appointment

## 2020-12-01 NOTE — Clinical Note
Admitting Physician: JAYLEN GLASS [[id number]]   Clinical Service: [facility] Replaced by [facility] [[id number]]   Bed Type: Observation Unit [54]   Special needs: Fall Risk [8]   Bed request comments: Bed request @ 1613   Cimzia Counseling:  I discussed with the patient the risks of Cimzia including but not limited to immunosuppression, allergic reactions and infections.  The patient understands that monitoring is required including a PPD at baseline and must alert us or the primary physician if symptoms of infection or other concerning signs are noted.

## 2020-12-08 DIAGNOSIS — M54.2 CERVICALGIA: Primary | ICD-10-CM

## 2020-12-08 RX ORDER — TRAMADOL HYDROCHLORIDE 50 MG/1
50 TABLET ORAL EVERY 6 HOURS PRN
Qty: 10 TABLET | Refills: 0 | Status: SHIPPED | OUTPATIENT
Start: 2020-12-08 | End: 2021-04-26

## 2020-12-08 NOTE — TELEPHONE ENCOUNTER
Patient is calling for a refill. He says his spasms have returned.    tramadol      Last Written Prescription Date:  historical  Last Fill Quantity: 0,   # refills: 0  Last Office Visit: 10/13/20  Future Office visit:       Routing refill request to provider for review/approval because:  Drug not active on patient's medication list

## 2020-12-16 DIAGNOSIS — I10 ESSENTIAL HYPERTENSION: ICD-10-CM

## 2020-12-17 RX ORDER — LISINOPRIL 10 MG/1
TABLET ORAL
Qty: 90 TABLET | Refills: 1 | Status: SHIPPED | OUTPATIENT
Start: 2020-12-17 | End: 2021-08-01

## 2020-12-17 NOTE — TELEPHONE ENCOUNTER
Prescription approved per [facility] Refill Protocol.  Yris Miller RN, BSN

## 2020-12-20 ENCOUNTER — APPOINTMENT (OUTPATIENT)
Dept: GENERAL RADIOLOGY | Facility: CLINIC | Age: 75
End: 2020-12-20
Attending: EMERGENCY MEDICINE
Payer: COMMERCIAL

## 2020-12-20 ENCOUNTER — HOSPITAL ENCOUNTER (EMERGENCY)
Facility: CLINIC | Age: 75
Discharge: HOME | End: 2020-12-20
Attending: EMERGENCY MEDICINE | Admitting: EMERGENCY MEDICINE
Payer: COMMERCIAL

## 2020-12-20 VITALS
RESPIRATION RATE: 18 BRPM | HEART RATE: 95 BPM | HEIGHT: 72 IN | DIASTOLIC BLOOD PRESSURE: 78 MMHG | TEMPERATURE: 98.5 F | WEIGHT: 260 LBS | OXYGEN SATURATION: 95 % | SYSTOLIC BLOOD PRESSURE: 160 MMHG | BODY MASS INDEX: 35.21 KG/M2

## 2020-12-20 DIAGNOSIS — S82.122A CLOSED FRACTURE OF LATERAL PORTION OF LEFT TIBIAL PLATEAU, INITIAL ENCOUNTER: ICD-10-CM

## 2020-12-20 PROCEDURE — 27530 TREAT KNEE FRACTURE: CPT | Mod: LT

## 2020-12-20 PROCEDURE — 73562 X-RAY EXAM OF KNEE 3: CPT | Mod: LT

## 2020-12-20 PROCEDURE — 99284 EMERGENCY DEPT VISIT MOD MDM: CPT | Mod: 25

## 2020-12-20 PROCEDURE — 250N000013 HC RX MED GY IP 250 OP 250 PS 637: Performed by: EMERGENCY MEDICINE

## 2020-12-20 RX ORDER — ACETAMINOPHEN 500 MG
1000 TABLET ORAL ONCE
Status: COMPLETED | OUTPATIENT
Start: 2020-12-20 | End: 2020-12-20

## 2020-12-20 RX ORDER — HYDROCODONE BITARTRATE AND ACETAMINOPHEN 5; 325 MG/1; MG/1
1 TABLET ORAL EVERY 4 HOURS PRN
Qty: 12 TABLET | Refills: 0 | Status: SHIPPED | OUTPATIENT
Start: 2020-12-20 | End: 2020-12-31

## 2020-12-20 RX ADMIN — ACETAMINOPHEN 1000 MG: 500 TABLET, FILM COATED ORAL at 05:27

## 2020-12-20 ASSESSMENT — MIFFLIN-ST. JEOR: SCORE: 1947.35

## 2020-12-20 NOTE — ED AVS SNAPSHOT
[facility] Emergency Dept  [address]  [facility] [address]  Phone: [phone]  Fax: [phone]                                    Lance Ibrahim   MRN: [id number]    Department: [facility] Emergency Dept   Date of Visit: 12/20/2020           After Visit Summary Signature Page    I have received my discharge instructions, and my questions have been answered. I have discussed any challenges I see with this plan with the nurse or doctor.    ..........................................................................................................................................  Patient/Patient Representative Signature      ..........................................................................................................................................  Patient Representative Print Name and Relationship to Patient    ..................................................               ................................................  Date                                   Time    ..........................................................................................................................................  Reviewed by Signature/Title    ...................................................              ..............................................  Date                                               Time          22EPIC Rev 08/18

## 2020-12-20 NOTE — ED PROVIDER NOTES
History     Chief Complaint:  Left Knee Pain    The history is provided by the patient.     Lance Ibrahim is a 75 year old male who presents for evaluation of left knee pain after a fall. Yesterday afternoon, around 1300, he reports slipping on a patch of glare ice while trying to walk down his driveway to the mailbox. He reports falling onto his left side and notes his left leg bent backwards, underneath his body. He denies hitting his head or losing consciousness. After impact, he reports laying on the ground for a bit but was able to stand on his own. As soon as he tried to move, he had immediate pain in his left knee. This pain has persisted despite using lidocaine gel, ice packs, and NSAIDs (most recently at 2200) at home. When the pain was still present this morning, he decided to have it evaluated in the ED. Here, he reports the worst pain is in his left kneecap. He reports being able to stand without significant difficulty, but the pain drastically worsens with any movement. He denies any other injuries from the fall.     Allergies:  Penicillins  Sulfa Drugs  Ancef     Medications:    Amlodipine   Baby aspirin   Atorvastatin   Proscar   Ferrous gluconate   Imdur   Labetalol  Lisinopril   Claritin   Metformin   Mitigare   Flomax  Torsemide   Tramadol     Past Medical History:    Arthritis  BPH   CAD  Cardiomyopathy   Cerebral artery occlusion with infarction   Chronic rhinitis   Hypertension   Hyperlipidemia  Kidney stones   Peripheral vascular disease  Sleep apnea   TIA   Type II diabetes  Peripheral vascular disease  CKD, stage 3     Past Surgical History:    Left third toe amputation   Right knee arthroplasty   Heart cath   Diastasis recti repair   Extracapsular cataract extraction with IOL implant, bilateral   Ventral hernia repair   Foot cyst removal   ORIF left shoulder      Family History:    Cancer      Social History:  Marital Status:   Former smoker.   Negative for alcohol use.     Review of  Systems   Musculoskeletal:        Left knee pain   Neurological: Negative for syncope.   All other systems reviewed and are negative.    Physical Exam     Patient Vitals for the past 24 hrs:   BP Temp Temp src Pulse Resp SpO2 Height Weight   12/20/20 0530  160/78 -- -- 95 -- 95 % -- --   12/20/20 0529 -- -- -- -- -- -- 1.829 m (6') 117.9 kg (260 lb)   12/20/20 0520  156/75 -- -- -- -- 97 % -- --   12/20/20 0506  187/93 98.5  F (36.9  C) Oral 108 18 94 % -- --        Physical Exam  Nursing note and vitals reviewed.  Constitutional: Cooperative.   HENT:   Freely moving neck  Cardiovascular: Normal rate, regular rhythm. 2+DP pulse on left  Pulmonary/Chest: Effort normal and breath sounds normal. No respiratory distress. No wheezes. .   Abdominal: Soft. Normal appearance. There is no tenderness.  Musculoskeletal: Left lower extremity: Tender over the proximal fibula and lateral tibia plateau. Joint effusion noted. Able to do a straight leg raise. Range of motion (flexion) limited due to pain.   Neurological: Alert.   Skin: Skin is warm and dry. .   Psychiatric: Normal mood and affect.     Emergency Department Course   Imaging:  XR Knee, 3 views, Left:  Degenerative change. Joint effusion. No displaced fracture or dislocation. Irregularity at the lateral tibial plateau could be degenerative. If there is high concern for fracture then consider CT, as per radiology.     Emergency Department Course:    Reviewed:  I reviewed the patient's nursing notes, vitals, and past medical records.     Assessments:  0514: I performed an examination of the patient at this time, as documented above.   0558: I reassessed the patient and discussed the imaging results and recommendation for orthopedic follow-up.     Interventions:  0527: Tylenol, 1000 mg, PO    Disposition:  Findings and plan explained to Lance and he was discharged home with instructions regarding supportive care, medications, and reasons to return. The importance of close  follow-up was reviewed. The patient was prescribed Norco and knee was placed in an immobilizer.    Impression & Plan      Medical Decision Making:   Lance Ibrahim is a 75 year old male who presents with persistent pain in his left knee after a hyperflexion fall with axial loading. He has an irregularity on the lateral aspect of his tibial plateau consistent with fracture. Also supporting this would be the joint effusion noted on imaging and tenderness on exam. He will be placed in a knee immobilizer with crutches and strict non-weight bearing instructions with plan to follow-up with orthopedics later this week for consideration of possibly operative vs conservative management.      Diagnosis:     ICD-10-CM    1. Closed fracture of lateral portion of left tibial plateau, initial encounter  S82.122A        Disposition:   Discharged to home.    Discharge Medications:  New Prescriptions    HYDROCODONE-ACETAMINOPHEN (NORCO) 5-325 MG TABLET    Take 1 tablet by mouth every 4 hours as needed for pain      Scribe Disclosure:  Jena PHILLIPS, am serving as a scribe on 12/20/2020 at 6:06 AM to personally document services performed by Lance Cheng MD based on my observations and the provider's statements to me.     12/20/2020   EMERGENCY DEPARTMENT     Lance Cheng MD  12/20/20 0611

## 2020-12-21 ENCOUNTER — TELEPHONE (OUTPATIENT)
Dept: ORTHOPEDICS | Facility: CLINIC | Age: 75
End: 2020-12-21

## 2020-12-21 ENCOUNTER — OFFICE VISIT (OUTPATIENT)
Dept: ORTHOPEDICS | Facility: CLINIC | Age: 75
End: 2020-12-21
Payer: COMMERCIAL

## 2020-12-21 ENCOUNTER — HOSPITAL ENCOUNTER (OUTPATIENT)
Dept: MRI IMAGING | Facility: CLINIC | Age: 75
Discharge: HOME | End: 2020-12-21
Attending: FAMILY MEDICINE | Admitting: FAMILY MEDICINE
Payer: COMMERCIAL

## 2020-12-21 VITALS
WEIGHT: 260 LBS | SYSTOLIC BLOOD PRESSURE: 118 MMHG | BODY MASS INDEX: 35.21 KG/M2 | HEIGHT: 72 IN | DIASTOLIC BLOOD PRESSURE: 58 MMHG

## 2020-12-21 DIAGNOSIS — M25.462 EFFUSION OF LEFT KNEE: ICD-10-CM

## 2020-12-21 DIAGNOSIS — S82.142A CLOSED FRACTURE OF LEFT TIBIAL PLATEAU, INITIAL ENCOUNTER: Primary | ICD-10-CM

## 2020-12-21 DIAGNOSIS — M25.562 ACUTE PAIN OF LEFT KNEE: ICD-10-CM

## 2020-12-21 DIAGNOSIS — S82.142A CLOSED FRACTURE OF LEFT TIBIAL PLATEAU, INITIAL ENCOUNTER: ICD-10-CM

## 2020-12-21 PROCEDURE — 73721 MRI JNT OF LWR EXTRE W/O DYE: CPT | Mod: 26 | Performed by: RADIOLOGY

## 2020-12-21 PROCEDURE — 73721 MRI JNT OF LWR EXTRE W/O DYE: CPT | Mod: LT

## 2020-12-21 PROCEDURE — 99204 OFFICE O/P NEW MOD 45 MIN: CPT | Performed by: FAMILY MEDICINE

## 2020-12-21 ASSESSMENT — MIFFLIN-ST. JEOR: SCORE: 1947.35

## 2020-12-21 NOTE — TELEPHONE ENCOUNTER
Spoke with clinic staff. They feel that this can be done. LVM for patient to inform him to call when he arrives.     Also tried calling his wife.     Rajani Young M.Ed., LAT, ATC

## 2020-12-21 NOTE — PATIENT INSTRUCTIONS
1. Closed fracture of left tibial plateau, initial encounter    2. Acute pain of left knee    3. Effusion of left knee      MRI of your left knee has been ordered.  Scheduled for tonight at [facility] (same building your in right now). Check-in at 6:15 on the first floor.  Given a hinged knee brace  Nurse is notifying your wife's primary care doctor to help with social work consult and assistance with resources during this period.  I will call you with results of your MRI. Until then continue to be strict non-weight bearing.

## 2020-12-21 NOTE — PROGRESS NOTES
ASSESSMENT & PLAN    1. Closed fracture of left tibial plateau, initial encounter    2. Acute pain of left knee    3. Effusion of left knee      Seen for acute left knee pain after a fall and concern for fracture  MRI of your left knee has been ordered.  Scheduled for tonight at [facility] (same building your in right now). Check-in at 6:15 on the first floor.  Given a hinged knee brace  Nurse is notifying your wife's primary care doctor to help with social work consult and assistance with resources during this period.  I will call you with results of your MRI. Until then continue to be strict non-weight bearing.    -----    SUBJECTIVE  Lance Ibrahim is a/an 75 year old male who is seen as a self referral for evaluation of left knee pain. The patient is seen by themselves.    Onset: 2 days ago - 12/19/20. Patient describes injury as he slipped on the ice in his driveway and fell to his left side with his leg bent underneath his body.  Location of Pain: left lateral, anterior and medial knee (left lateral knee worse)  Rating of Pain at worst: 9/10  Rating of Pain Currently: 9/10  Worsened by: knee flexion and extension, twisting / pivoting, weight bearing, changing positions  Better with: laying down, rest / activity avoidance  Treatments tried: rest/activity avoidance, other medications: Hydrocodone/Acetaminophen (Vicodin/Norco), previous imaging (xray 12/20/20), casting/splinting/bracing and crutches  Associated symptoms: swelling  Orthopedic history: YES - chronic right knee pain, low back pain  Relevant surgical history: YES - right TKA Date: 12/7/2018  Patient Social History: working as occupational  at [employer], 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    REVIEW OF SYSTEMS:  10 point ROS is negative other than symptoms noted above in HPI, Past Medical History or as  stated below  Constitutional: NEGATIVE for fever, chills, change in weight  Skin: NEGATIVE for worrisome rashes, moles or lesions  GI/: NEGATIVE for bowel or bladder changes  Neuro: NEGATIVE for weakness, dizziness or paresthesias    OBJECTIVE:  /58   Ht 1.829 m (6')   Wt 117.9 kg (260 lb)   BMI 35.26 kg/m     General: healthy, alert and in no distress  HEENT: no scleral icterus or conjunctival erythema  Skin: no suspicious lesions or rash. No jaundice.  CV: no pedal edema  Resp: normal respiratory effort without conversational dyspnea   Psych: normal mood and affect  Gait: normal steady gait with appropriate coordination and balance  Neuro: Normal light sensory exam of lower extremity  MSK:  RIGHT KNEE  Inspection:    normal alignment  Palpation:    Tender about the lateral tibial plateau and femoral condyle. Also tender shukri-patellar. Remainder of bony and ligamentous landmarks are nontender.    Large effusion is present  Range of Motion:     Resists all motion secondary to pain. Resting with near full extension.   Strength:    Extensor mechanism intact    Independent visualization of the below image:  Results for orders placed or performed during the hospital encounter of 12/20/20   XR Knee Left 3 Views    Narrative    EXAM: XR KNEE LT 3 VW  LOCATION: [facility]  DATE/TIME: 12/20/2020 5:37 AM    INDICATION: Fall on knee. Pain  COMPARISON: None.      Impression    IMPRESSION: Degenerative change. Joint effusion. No displaced fracture or dislocation. Irregularity at the lateral tibial plateau could be degenerative. If there is high concern for fracture then consider CT.     Mika Delacruz, DO CAM  [facility]

## 2020-12-21 NOTE — TELEPHONE ENCOUNTER
Message from Lance, says he has a 9:40 appointment and will be downstairs at 9:30.  Asks if someone can meet him down in the lobby with a wheelchair as he has a left knee fracture and is slow moving.

## 2020-12-21 NOTE — LETTER
12/21/2020         RE: Lance Ibrahim  [address] [place] [address]        Dear Colleague,    Thank you for referring your patient, Lance Ibrahim, to the [facility] [place]. Please see a copy of my visit note below.    ASSESSMENT & PLAN    1. Closed fracture of left tibial plateau, initial encounter    2. Acute pain of left knee    3. Effusion of left knee      Seen for acute left knee pain after a fall and concern for fracture  MRI of your left knee has been ordered.  Scheduled for tonight at [facility] (same building your in right now). Check-in at 6:15 on the first floor.  Given a hinged knee brace  Nurse is notifying your wife's primary care doctor to help with social work consult and assistance with resources during this period.  I will call you with results of your MRI. Until then continue to be strict non-weight bearing.    -----    SUBJECTIVE  Lance Ibrahim is a/an 75 year old male who is seen as a self referral for evaluation of left knee pain. The patient is seen by themselves.    Onset: 2 days ago - 12/19/20. Patient describes injury as he slipped on the ice in his driveway and fell to his left side with his leg bent underneath his body.  Location of Pain: left lateral, anterior and medial knee (left lateral knee worse)  Rating of Pain at worst: 9/10  Rating of Pain Currently: 9/10  Worsened by: knee flexion and extension, twisting / pivoting, weight bearing, changing positions  Better with: laying down, rest / activity avoidance  Treatments tried: rest/activity avoidance, other medications: Hydrocodone/Acetaminophen (Vicodin/Norco), previous imaging (xray 12/20/20), casting/splinting/bracing and crutches  Associated symptoms: swelling  Orthopedic history: YES - chronic right knee pain, low back pain  Relevant surgical history: YES - right TKA Date: 12/7/2018  Patient Social History: working as occupational  at [employer], 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    REVIEW OF SYSTEMS:  10 point ROS is negative other than symptoms noted above in HPI, Past Medical History or as stated below  Constitutional: NEGATIVE for fever, chills, change in weight  Skin: NEGATIVE for worrisome rashes, moles or lesions  GI/: NEGATIVE for bowel or bladder changes  Neuro: NEGATIVE for weakness, dizziness or paresthesias    OBJECTIVE:  /58   Ht 1.829 m (6')   Wt 117.9 kg (260 lb)   BMI 35.26 kg/m     General: healthy, alert and in no distress  HEENT: no scleral icterus or conjunctival erythema  Skin: no suspicious lesions or rash. No jaundice.  CV: no pedal edema  Resp: normal respiratory effort without conversational dyspnea   Psych: normal mood and affect  Gait: normal steady gait with appropriate coordination and balance  Neuro: Normal light sensory exam of lower extremity  MSK:  RIGHT KNEE  Inspection:    normal alignment  Palpation:    Tender about the lateral tibial plateau and femoral condyle. Also tender shukri-patellar. Remainder of bony and ligamentous landmarks are nontender.    Large effusion is present  Range of Motion:     Resists all motion secondary to pain. Resting with near full extension.   Strength:    Extensor mechanism intact    Independent visualization of the below image:  Results for orders placed or performed during the hospital encounter of 12/20/20   XR Knee Left 3 Views    Narrative    EXAM: XR KNEE LT 3 VW  LOCATION: [facility]  DATE/TIME: 12/20/2020 5:37 AM    INDICATION: Fall on knee. Pain  COMPARISON: None.      Impression    IMPRESSION: Degenerative change. Joint effusion. No displaced fracture or dislocation. Irregularity at the lateral tibial plateau could be degenerative. If there is high concern for fracture then consider CT.     Mika Delacruz, DO CAQSM  [facility]        Again, thank you for allowing me to participate in the care of your patient.        Sincerely,        Mika Delacruz, DO

## 2020-12-22 ENCOUNTER — TELEPHONE (OUTPATIENT)
Dept: INTERNAL MEDICINE | Facility: CLINIC | Age: 75
End: 2020-12-22

## 2020-12-22 DIAGNOSIS — S82.209K: ICD-10-CM

## 2020-12-22 DIAGNOSIS — Z74.8 ASSISTANCE NEEDED WITH TRANSPORTATION: Primary | ICD-10-CM

## 2020-12-22 DIAGNOSIS — S82.409K: ICD-10-CM

## 2020-12-22 NOTE — TELEPHONE ENCOUNTER
Reason for Call:   call back    Detailed comments: Dr. Dleacruz from [facility] [place] called to discuss patient.  Requesting call back.    Phone Number Patient can be reached at: Other phone number:  Dr. Delacruz [phone]    Best Time: any    Can we leave a detailed message on this number? YES    Call taken on 12/22/2020 at 12:27 PM by Rachel Courtney

## 2020-12-23 NOTE — TELEPHONE ENCOUNTER
Reason for Call:  Request for results:    Name of test or procedure: MRI left knee    Date of test of procedure: 12/21/20    Results:                                                             Impression:  Motion partially compromising assessment.  1. Slightly depressed, approximately 1-2 mm, lateral tibial plateau  fracture.   2. Horizontal tear of the body and posterior horn of medial meniscus.  3. Complete tear of the tibial collateral ligament proximally at and  adjacent to the femoral attachment.   4. Posterolateral pericapsular edema, consistent with underlying  capsular injury including expected location of arcuate ligament.   5. Possible low-grade intrasubstance partial tear of the fibular  collateral ligament at its proximal attachment.   6. Tricompartmental chondromalacia with grade 4 changes in  patellofemoral and medial compartments.        Plan for results from last OV: call patient    OK to leave the result message on voice mail or with a family member? YES    Phone number Patient can be reached at:  Home number on file [phone] (home) or cell: [phone], try home number first.     Additional comments:   Phone call to patient. He states the MRI tech told him results would be back in 24 hrs. He did not get a call yesterday and is checking status of results.   Informed that Dr. Delacruz is out of the office.   Will have her review and get back with him tomorrow.       CARLA Black RN

## 2020-12-24 ENCOUNTER — PATIENT OUTREACH (OUTPATIENT)
Dept: NURSING | Facility: CLINIC | Age: 75
End: 2020-12-24
Attending: NURSE PRACTITIONER
Payer: COMMERCIAL

## 2020-12-24 ENCOUNTER — TELEPHONE (OUTPATIENT)
Dept: ORTHOPEDICS | Facility: CLINIC | Age: 75
End: 2020-12-24

## 2020-12-24 ASSESSMENT — ACTIVITIES OF DAILY LIVING (ADL): DEPENDENT_IADLS:: INDEPENDENT

## 2020-12-24 NOTE — TELEPHONE ENCOUNTER
"Called and notified of MRI results.  Continue with non-weight bearing status x 8 weeks.  Notified I had discussed his diagnosis with Anh  Spivey and she has placed a care coordination order to determine what assistance he may need during this 8 week period.  Also notified that I called Dr. Roth's office and explained my recommendations and that they also placed a care coordination order for his wife to determine what additional services she may need now that he is non-weight bearing and unable to provide all the care he was doing previously.    Asked about long-term plans for his wife. Indicated that they are waiting on surgery for \"rupture discs\" prior to transitioning to another facility.  Per his report during our previous visit - his wife is 100% bed bound and has been for 3 years due to a progressive neurologic disorder.    Requested that Lance follow-up in 4 weeks with either myself or Dr. Yeo.  If plateau fracture does not heal well - end result would be a knee replacement. Would need to again coordinate adequate care for his wife as this will likely require a hospital stay.    Mika Delacruz DO, CAQSM  [facility]    "

## 2020-12-24 NOTE — TELEPHONE ENCOUNTER
Per discussion with Dr Delacruz, referral for Care Coordination to assist pt with his own care.  She will reach out to his Wifes PCP for CC referral for the wifes constant care.  Anh Spivey CNP

## 2020-12-24 NOTE — TELEPHONE ENCOUNTER
Dr. Delacruz called and spoke to patient today.   Please see Telephone Encounter dated 12/24/20 for further documentation.     Mai Mcdowell, ATC

## 2020-12-24 NOTE — PROGRESS NOTES
Clinic Care Coordination Contact    Clinic Care Coordination Contact  OUTREACH    Referral Information:  Referral Source: PCP         Chief Complaint   Patient presents with     Clinic Care Coordination - Initial        Universal Utilization: PCP referral  Clinic Utilization  Difficulty keeping appointments:: No  Compliance Concerns: No  No-Show Concerns: No  No PCP office visit in Past Year: No  Utilization    Last refreshed: 12/24/2020 10:48 AM: Hospital Admissions 2           Last refreshed: 12/24/2020 10:48 AM: ED Visits 3           Last refreshed: 12/24/2020 10:48 AM: No Show Count (past year) 0              Current as of: 12/24/2020 10:48 AM              Clinical Concerns:  Current Medical Concerns:   Patient Active Problem List   Diagnosis     Ventral hernia     Chronic rhinitis     Hypertrophy of prostate with urinary obstruction     Transient cerebral ischemia     Essential hypertension     CARDIOVASCULAR SCREENING; LDL GOAL LESS THAN 100     Gout     Type 2 diabetes, HbA1c goal < 7% (H)     Cervicalgia     Amputated toe, left (H)     Type 2 diabetes mellitus with peripheral vascular disease (H)     Total knee replacement status     Dyspnea on exertion     Chest pain, unspecified type     Cardiomyopathy, unspecified type (H)     Shortness of breath     Renal colic     Obesity (BMI 35.0-39.9) with comorbidity (H)     CKD (chronic kidney disease) stage 3, GFR 30-59 ml/min     Ulcerated, foot (H)     Current Behavioral Concerns: None noted.   Education Provided to patient: RN CC role and reason for call.       Health Maintenance Reviewed:    Clinical Pathway: None    Medication Management:  Current Outpatient Medications   Medication Instructions     amLODIPine (NORVASC) 5 MG tablet TAKE 1 TABLET BY MOUTH EVERY DAY     aspirin (ASA) 81 mg, Oral, DAILY     atorvastatin (LIPITOR) 40 mg, Oral, DAILY     cyclobenzaprine (FLEXERIL) 10 mg, Oral, 3 TIMES DAILY PRN     diclofenac (VOLTAREN) 1 % topical gel  Transdermal, 4 TIMES DAILY     Ferrous Gluconate 240 (27 Fe) MG TABS 1 tablet, Oral, DAILY     finasteride (PROSCAR) 5 mg, DAILY     fluticasone (FLONASE) 50 MCG/ACT spray 1 spray, Both Nostrils, AT BEDTIME PRN     isosorbide mononitrate (IMDUR) 30 mg, Oral, DAILY     labetalol (NORMODYNE) 200 mg, Oral, 2 TIMES DAILY     lisinopril (ZESTRIL) 10 MG tablet TAKE 1 TABLET BY MOUTH EVERY DAY     loratadine (CLARITIN) 10 mg, Oral, AT BEDTIME PRN     metFORMIN (GLUCOPHAGE) 1,000 mg, Oral, 2 TIMES DAILY WITH MEALS     MITIGARE 0.6 MG capsule TAKE 1 CAPSULE (0.6 MG) BY MOUTH 2 TIMES DAILY     Multiple Vitamins-Minerals (MULTIVITAMIN ADULTS PO) 1 tablet, Oral, DAILY     tamsulosin (FLOMAX) 0.4 MG 24 hr capsule 1 capsule, DAILY     torsemide (DEMADEX) 20 MG tablet TAKE 1 TABLET BY MOUTH EVERY DAY     traMADol (ULTRAM) 50 mg, Oral, EVERY 6 HOURS PRN        Functional Status:  Dependent ADLs:: Independent  Dependent IADLs:: Independent  Bed or wheelchair confined:: No  Mobility Status: Independent    Living Situation:  Current living arrangement:: I live in a private home with family  Type of residence:: Private home - [facility]    Lifestyle & Psychosocial Needs:        Diet:: Regular  Inadequate nutrition (GOAL):: No  Tube Feeding: No  Significant changes in sleep pattern (GOAL): No  Transportation means:: Regular car     Taoism or spiritual beliefs that impact treatment:: No  Mental health DX:: No  Mental health management concern (GOAL):: No  Chemical Dependency Status: No Current Concerns  Informal Support system:: Family   Socioeconomic History     Marital status:      Spouse name: Not on file     Number of children: Not on file     Years of education: Not on file     Highest education level: Not on file   Occupational History     Employer: SELF     Tobacco Use     Smoking status: Former Smoker     Packs/day: 0.00     Quit date: 3/17/1993     Years since quittin.7     Smokeless tobacco: Never Used   Substance  and Sexual Activity     Alcohol use: Not Currently     Drug use: No     Sexual activity: Never     Lance states that he has a plan in place for caregivers for Monique, for when he has surgery. Patient is not worried about this, as in the past when he had his knees replaced, the care givers he had in place have offered to help again. Patient is receiving home care through [facility], and Lance is comfortable adding more C hours if needed. Lance has additional support with his son, neighbor and a family friend have all offered to help. Lance declines care coordination, stating he has all the support and reosurces he needs at this time.     Resources and Interventions:  Current Resources:      Community Resources: None  Supplies used at home:: None  Equipment Currently Used at Home: none  Employment Status: retired)    Advance Care Plan/Directive  Advanced Care Plans/Directives on file:: Yes  Type Advanced Care Plans/Directives: Advanced Directive - On File  Advanced Care Plan/Directive Status: Not Applicable    Referrals Placed: None     Goals: None      Patient/Caregiver understanding: Patient reports understanding and denies any additional questions or concerns at this times. RN CC engaged in AIDET communication during encounter.       Future Appointments              Today Rn, [facility] [place]          Plan: Patient was provided with writers contact information and encouraged to call with questions, concerns, support needs. RNCC will remain available as needed.    Sheila Woodard RN Care Coordinator  [facility]  Email: [email or web address]  Phone: [phone]

## 2020-12-24 NOTE — TELEPHONE ENCOUNTER
Discussed this with Dr Delacruz and routed to the CC through wife's chart. Lance is non wt bearing for 8 weeks.

## 2020-12-30 ENCOUNTER — TELEPHONE (OUTPATIENT)
Dept: INTERNAL MEDICINE | Facility: CLINIC | Age: 75
End: 2020-12-30

## 2020-12-30 DIAGNOSIS — M25.561 RIGHT KNEE PAIN, UNSPECIFIED CHRONICITY: Primary | ICD-10-CM

## 2020-12-30 NOTE — TELEPHONE ENCOUNTER
12/19/20 patient fell and left knee was injured, last night his right knee started hurting now due to compensating for the left side. He is on crutches and is the primary caregiver for his bedridden wife. He did not sleep at all last night due to the pain and he is requesting a refill of the Norco so he can at least sleep at night.       Jessica Gann RN, BSN, PHN

## 2020-12-31 ENCOUNTER — HOSPITAL ENCOUNTER (EMERGENCY)
Facility: CLINIC | Age: 75
Discharge: HOME | End: 2020-12-31
Attending: EMERGENCY MEDICINE | Admitting: EMERGENCY MEDICINE
Payer: COMMERCIAL

## 2020-12-31 ENCOUNTER — APPOINTMENT (OUTPATIENT)
Dept: GENERAL RADIOLOGY | Facility: CLINIC | Age: 75
End: 2020-12-31
Attending: EMERGENCY MEDICINE
Payer: COMMERCIAL

## 2020-12-31 VITALS
SYSTOLIC BLOOD PRESSURE: 160 MMHG | RESPIRATION RATE: 18 BRPM | OXYGEN SATURATION: 92 % | HEART RATE: 75 BPM | DIASTOLIC BLOOD PRESSURE: 86 MMHG | TEMPERATURE: 98.5 F

## 2020-12-31 DIAGNOSIS — M25.561 ACUTE PAIN OF RIGHT KNEE: ICD-10-CM

## 2020-12-31 PROCEDURE — 73562 X-RAY EXAM OF KNEE 3: CPT | Mod: RT

## 2020-12-31 PROCEDURE — 99283 EMERGENCY DEPT VISIT LOW MDM: CPT

## 2020-12-31 PROCEDURE — 250N000013 HC RX MED GY IP 250 OP 250 PS 637: Performed by: EMERGENCY MEDICINE

## 2020-12-31 RX ORDER — HYDROCODONE BITARTRATE AND ACETAMINOPHEN 5; 325 MG/1; MG/1
1 TABLET ORAL ONCE
Status: DISCONTINUED | OUTPATIENT
Start: 2020-12-31 | End: 2020-12-31

## 2020-12-31 RX ORDER — HYDROCODONE BITARTRATE AND ACETAMINOPHEN 5; 325 MG/1; MG/1
1 TABLET ORAL EVERY 4 HOURS PRN
Qty: 12 TABLET | Refills: 0 | Status: SHIPPED | OUTPATIENT
Start: 2020-12-31 | End: 2021-04-26

## 2020-12-31 RX ORDER — HYDROCODONE BITARTRATE AND ACETAMINOPHEN 5; 325 MG/1; MG/1
1-2 TABLET ORAL EVERY 6 HOURS PRN
Qty: 14 TABLET | Refills: 0 | Status: SHIPPED | OUTPATIENT
Start: 2020-12-31 | End: 2021-01-03

## 2020-12-31 RX ORDER — HYDROCODONE BITARTRATE AND ACETAMINOPHEN 5; 325 MG/1; MG/1
1 TABLET ORAL ONCE
Status: COMPLETED | OUTPATIENT
Start: 2020-12-31 | End: 2020-12-31

## 2020-12-31 RX ADMIN — HYDROCODONE BITARTRATE AND ACETAMINOPHEN 1 TABLET: 5; 325 TABLET ORAL at 12:55

## 2020-12-31 ASSESSMENT — ENCOUNTER SYMPTOMS
JOINT SWELLING: 1
NUMBNESS: 0
ARTHRALGIAS: 1

## 2020-12-31 NOTE — ED TRIAGE NOTES
Patient arrives via EMS for right knee pain. Patient was seen on 12/19 for a fall with left knee pain. Over the last couple days patient began having pain to the right knee that is a completely artificial joint. EMS gave 100mcg fentanyl. ABCs intact.

## 2020-12-31 NOTE — ED NOTES
Bed: ED27  Expected date: 12/31/20  Expected time: 11:06 AM  Means of arrival: Ambulance  Comments:  bv3

## 2020-12-31 NOTE — ED PROVIDER NOTES
"  History   Chief Complaint:  Knee Pain     HPI   Lance Ibrahim is a 75 year old male with history of arthritis, CKD, CAD, hyperlipidemia, hypertension, who presents with knee pain. Patient was seen on 12/19 for a fall which included left knee pain. He slipped on the ice and had x-ray on the 12/18/2020. He broke his tibia and has had to place all his weight on his right side. The patient has been having right knee pain for several days which is a complete artifical joint, replaced in 2018. Starting four days ago night, his right knee began to hurt. Every time he moves during sleep, he has increasing pain. It feels like a \"drill\" being placed on his knee. While awake and alert, he is able to mitigate any knee pain with careful movement. He is concerned the replacement is not well placed and will have to be re-done. He has pain when bending the knee. He has home assistance as his wife is bed-ridden. They are there for three days, three times a day. He lives on the second floor of his building. Up to four days ago, he was able to get up and down the stair using crutches but he is unable to do so now due to his pain. He has no ability to hold himself up in place.he has a wheelchair on his first floor. He has been in bed for the past two days and has been managing with the pain at home.  He has been on hydrocodone following his left leg fracture, but is currently out.  The [place] [employer] was was called and transported him to the emergency department and given 100 mcg of a Fentanyl. They needed to use a motorized stretcher to move him out of his building.     Review of Systems   Musculoskeletal: Positive for arthralgias (right knee) and joint swelling (right knee).   Neurological: Negative for numbness.   All other systems reviewed and are negative.        Allergies:  Penicillins  Sulfa Drugs  Ancef     Medications:  Amlodipine   Aspirin 81 mg   Atorvastatin   Flexeril   Proscar   Norco "   Labetalol  Lisinopril  Claritin   Metformin   Flomax   Demadex   Tramadol     Past Medical History:    Arthritis   BPH  CAD  Cardiomyopathy   Cervicalgia  Cerebral Artery Occlusion with Cerebral Infarction  CKD - Stage 3  Hypertension  Hyperlipidemia   Gout   Kidney Stones   PVD  Renal Colic   Sleep Apnea  TIA  Type II Diabetes   Ureteral Stones   Ulcerate Foot     Past Surgical History:    Amputate Toes - Left  Arthroplasty Knee - Right  Colonoscopy   CV Heart Catheterization with Possible Intervention   Diastasis Recti Repair   Extracapsular Cataract Extration with Intraocular Lens Implant x2 - Left and Right   Foot Surgery - Removal of Cyst   Hernia Repair - Ventral   ORIF Shoulder     Family History:    Father - Cancer     Social History:     Arrives via EMS    Physical Exam     Patient Vitals for the past 24 hrs:   BP Temp Temp src Pulse Resp SpO2   12/31/20 1200 (!) 167/84 -- -- 77 -- 95 %   12/31/20 1145 (!) 155/77 -- -- 80 -- 96 %   12/31/20 1130 (!) 157/72 -- -- 81 -- 97 %   12/31/20 1123 (!) 174/74 98.5  F (36.9  C) Oral -- 18 98 %       Physical Exam    Nursing note and vitals reviewed.  Head:  The scalp, face, and head appear normal  Eyes:  Conjunctivae are normal  ENT:    The nose is normal    Pinnae are normal  Neck:  Trachea midline  CV:  Normal rate.  Normal DP pulses bilaterally.  Normal capillary refill.  Resp:  No respiratory distress   Musc:  Normal muscular tone    Right knee with effusion on the inferolateral aspect.  Pain with range of motion.  No warmth/erythema.  Unable to perform provocative testing secondary to pain.  Skin:  No rash or lesions noted  Neuro: Speech is normal and fluent. Face is symmetric. Moving all extremities well.   Psych:  Awake. Alert.  Normal affect.  Appropriate interactions.            Emergency Department Course     Imaging:  XR Knee Right 3 Views:  Right total knee arthroplasty without apparent  complication, unchanged from 12/7/2018.  Imaging  independently reviewed and agree with radiologist interpretation.     Emergency Department Course:    Reviewed:  I reviewed nursing notes, past medical history and care everywhere    Assessments:  1232 I assessed the patient as above    Interventions:  1255 Norco 325 mg 1 tablet PO    Disposition:  The patient was discharged to home.       Impression & Plan     Medical Decision Making:  Patient presents with via EMS with chief complaint right knee pain.  He fractured his left tibia with multiple ligamentous injuries about 2 weeks ago.  He has been needing to put all of his weight on his right knee, which has now suffered injury.  It has a previous replacement.  There is small effusion on exam.  No signs of septic arthritis.  X-ray negative for acute etiology.  Patient lives at home with his wife who is debilitated.  He has help coming into the home.  Given his significant difficulty walking, I offered him observation stay for social work consult and possible placement which patient declines.  He requests pain medication to help him through this.  We did Ace wrap his knee, and he already has assistive device.  Prescription for Norco written.  [facility] orthopedic follow-up next week.  He is discharged in stable condition.  All questions answered.    Diagnosis:    ICD-10-CM    1. Acute pain of right knee  M25.561        Discharge Medications:  New Prescriptions    HYDROCODONE-ACETAMINOPHEN (NORCO) 5-325 MG TABLET    Take 1-2 tablets by mouth every 6 hours as needed for severe pain       Scribe Disclosure:  I, Richard Peace, am serving as a scribe at 11:23 AM on 12/31/2020 to document services personally performed by Otilio Vladerrama MD based on my observations and the provider's statements to me.            Otilio Valderrama MD  12/31/20 4040

## 2020-12-31 NOTE — ED AVS SNAPSHOT
[facility] Emergency Dept  [address]  [facility] [address]  Phone: [phone]  Fax: [phone]                                    Lance Ibrahim   MRN: [id number]    Department: [facility] Emergency Dept   Date of Visit: 12/31/2020           After Visit Summary Signature Page    I have received my discharge instructions, and my questions have been answered. I have discussed any challenges I see with this plan with the nurse or doctor.    ..........................................................................................................................................  Patient/Patient Representative Signature      ..........................................................................................................................................  Patient Representative Print Name and Relationship to Patient    ..................................................               ................................................  Date                                   Time    ..........................................................................................................................................  Reviewed by Signature/Title    ...................................................              ..............................................  Date                                               Time          22EPIC Rev 08/18

## 2021-02-03 DIAGNOSIS — R06.09 DYSPNEA ON EXERTION: ICD-10-CM

## 2021-02-03 DIAGNOSIS — R07.9 CHEST PAIN, UNSPECIFIED TYPE: ICD-10-CM

## 2021-02-03 DIAGNOSIS — I42.9 CARDIOMYOPATHY, UNSPECIFIED TYPE (H): ICD-10-CM

## 2021-02-03 RX ORDER — ISOSORBIDE MONONITRATE 30 MG/1
30 TABLET, EXTENDED RELEASE ORAL DAILY
Qty: 90 TABLET | Refills: 0 | Status: SHIPPED | OUTPATIENT
Start: 2021-02-03 | End: 2021-06-07

## 2021-02-12 ENCOUNTER — TELEPHONE (OUTPATIENT)
Dept: INTERNAL MEDICINE | Facility: CLINIC | Age: 76
End: 2021-02-12

## 2021-02-12 NOTE — TELEPHONE ENCOUNTER
Call to pt.   Has gout.   2 Fingers of right hand and wrist. Taking the Mitigare.   Can't  cup.     Started being painful Yesterday, started pills last evening. Took another dose this AM.     He is going to take another dose this evening.     Advised to give it some more time. It has not been 24 hours for the medication to work.   He will Take the AM dose tomorrow, if that doesn't work, will call FNA to page the on call dr for alternative tomorrow around noon.      Advised to increase fluids, water to help. He agrees.     Uric Acid   Date Value Ref Range Status   11/28/2019 10.8 (H) 3.5 - 7.2 mg/dL Final     Creatinine   Date Value Ref Range Status   07/28/2020 1.19 0.66 - 1.25 mg/dL Final

## 2021-02-12 NOTE — TELEPHONE ENCOUNTER
Reason for call:  Patient reporting a symptom    Symptom or request: Gout    Duration (how long have symptoms been present): on going     Have you been treated for this before? Yes    Additional comments: Patient is having severe gout and states the medication he is taking wears off by mid day and he is wondering if there is something else he can do? Please advise. Thank yo     Phone Number patient can be reached at:  Cell number on file:    Telephone Information:   Mobile [phone]       Best Time:  ASAP    Can we leave a detailed message on this number:  YES    Call taken on 2/12/2021 at 3:18 PM by Fanta Austin

## 2021-02-17 ENCOUNTER — IMMUNIZATION (OUTPATIENT)
Dept: NURSING | Facility: CLINIC | Age: 76
End: 2021-02-17
Payer: COMMERCIAL

## 2021-02-17 PROCEDURE — 0001A PR COVID VAC PFIZER DIL RECON 30 MCG/0.3 ML IM: CPT

## 2021-02-17 PROCEDURE — 91300 PR COVID VAC PFIZER DIL RECON 30 MCG/0.3 ML IM: CPT

## 2021-02-22 ENCOUNTER — MYC MEDICAL ADVICE (OUTPATIENT)
Dept: INTERNAL MEDICINE | Facility: CLINIC | Age: 76
End: 2021-02-22

## 2021-02-23 NOTE — TELEPHONE ENCOUNTER
See his [employer] message. Please advise.     BP Readings from Last 3 Encounters:   12/31/20 (!) 160/86   12/21/20 118/58   12/20/20 (!) 160/78

## 2021-02-24 NOTE — TELEPHONE ENCOUNTER
Please contact pharmacy, is med on back order or recalled? Refer him to contact his cardiologist.  Anh Spivey CNP

## 2021-02-24 NOTE — TELEPHONE ENCOUNTER
Call to pharmacy.   She is able to refill his. They have enough in stock right now.   They just got the truck supply. Labetalol 200 mg from .   She states the Computer says [facility] has 1200 pills but she states it isn't always up to date. She is going to check with them too if needed.      She is going to call the pt if needed.

## 2021-03-10 ENCOUNTER — IMMUNIZATION (OUTPATIENT)
Dept: NURSING | Facility: CLINIC | Age: 76
End: 2021-03-10
Attending: INTERNAL MEDICINE
Payer: COMMERCIAL

## 2021-03-10 PROCEDURE — 0002A PR COVID VAC PFIZER DIL RECON 30 MCG/0.3 ML IM: CPT

## 2021-03-10 PROCEDURE — 91300 PR COVID VAC PFIZER DIL RECON 30 MCG/0.3 ML IM: CPT

## 2021-03-15 DIAGNOSIS — I10 ESSENTIAL HYPERTENSION: ICD-10-CM

## 2021-03-15 RX ORDER — ATORVASTATIN CALCIUM 40 MG/1
40 TABLET, FILM COATED ORAL DAILY
Qty: 60 TABLET | Refills: 0 | Status: SHIPPED | OUTPATIENT
Start: 2021-03-15 | End: 2021-05-11

## 2021-03-20 ENCOUNTER — HEALTH MAINTENANCE LETTER (OUTPATIENT)
Age: 76
End: 2021-03-20

## 2021-04-13 DIAGNOSIS — M10.00 IDIOPATHIC GOUT, UNSPECIFIED CHRONICITY, UNSPECIFIED SITE: ICD-10-CM

## 2021-04-15 ENCOUNTER — TELEPHONE (OUTPATIENT)
Dept: INTERNAL MEDICINE | Facility: CLINIC | Age: 76
End: 2021-04-15

## 2021-04-15 RX ORDER — COLCHICINE 0.6 MG/1
CAPSULE ORAL
Qty: 60 CAPSULE | Refills: 0 | Status: SHIPPED | OUTPATIENT
Start: 2021-04-15 | End: 2021-04-29

## 2021-04-15 NOTE — TELEPHONE ENCOUNTER
Routing refill request to provider for review/approval because:  Labs not current:    Uric Acid   Date Value Ref Range Status   11/28/2019 10.8 (H) 3.5 - 7.2 mg/dL Final       Renetta Recinos RN

## 2021-04-15 NOTE — TELEPHONE ENCOUNTER
Prior Authorization Retail Medication Request    Medication/Dose: MITIGARE 0.6 MG capsule  ICD code (if different than what is on RX):  Idiopathic gout, unspecified chronicity, unspecified site [M10.00]  Previously Tried and Failed:    Rationale:      Insurance Name:    Insurance ID:        Pharmacy Information (if different than what is on RX)  Name:  [facility]  Phone:  [phone]

## 2021-04-15 NOTE — TELEPHONE ENCOUNTER
Central Prior Authorization Team   Phone: [phone]      PA Initiation    Medication: MITIGARE 0.6 MG capsule  Insurance Company: [employer] - Phone [phone] Fax [phone]  Pharmacy Filling the Rx: [id number] - [place], [place] - [address]  Filling Pharmacy Phone: [phone]  Filling Pharmacy Fax:    Start Date: 4/15/2021

## 2021-04-21 ENCOUNTER — DOCUMENTATION ONLY (OUTPATIENT)
Dept: OTHER | Facility: CLINIC | Age: 76
End: 2021-04-21

## 2021-04-22 ASSESSMENT — ENCOUNTER SYMPTOMS
FREQUENCY: 1
JOINT SWELLING: 1
ARTHRALGIAS: 1
MYALGIAS: 1

## 2021-04-22 ASSESSMENT — ACTIVITIES OF DAILY LIVING (ADL): CURRENT_FUNCTION: NO ASSISTANCE NEEDED

## 2021-04-23 DIAGNOSIS — I10 ESSENTIAL HYPERTENSION: ICD-10-CM

## 2021-04-23 NOTE — TELEPHONE ENCOUNTER
Pending Prescriptions:                       Disp   Refills    amLODIPine (NORVASC) 5 MG tablet [Pharmacy*90 tab*3        Sig: TAKE 1 TABLET BY MOUTH EVERY DAY    Routing refill request to provider for review/approval because:  BP Readings from Last 3 Encounters:   12/31/20 (!) 160/86   12/21/20 118/58   12/20/20 (!) 160/78

## 2021-04-25 RX ORDER — AMLODIPINE BESYLATE 5 MG/1
TABLET ORAL
Qty: 90 TABLET | Refills: 3 | Status: SHIPPED | OUTPATIENT
Start: 2021-04-25 | End: 2021-12-18

## 2021-04-26 NOTE — TELEPHONE ENCOUNTER
Pharmacy states that they need a new script.       Prior Authorization Approval    Authorization Effective Date: 3/16/2021  Authorization Expiration Date: 4/25/2022  Medication: MITIGARE 0.6 MG capsule  Approved Dose/Quantity:    Reference #:     Insurance Company: [employer] - Phone [phone] Fax [phone]  Expected CoPay:       CoPay Card Available:      Foundation Assistance Needed:    Which Pharmacy is filling the prescription (Not needed for infusion/clinic administered): [id number] [address]  Pharmacy Notified: Yes  Patient Notified: Yes  **Instructed pharmacy to notify patient when script is ready to /ship.**

## 2021-04-29 ENCOUNTER — OFFICE VISIT (OUTPATIENT)
Dept: INTERNAL MEDICINE | Facility: CLINIC | Age: 76
End: 2021-04-29
Payer: COMMERCIAL

## 2021-04-29 VITALS
HEART RATE: 109 BPM | TEMPERATURE: 97 F | SYSTOLIC BLOOD PRESSURE: 135 MMHG | WEIGHT: 258.6 LBS | HEIGHT: 72 IN | DIASTOLIC BLOOD PRESSURE: 73 MMHG | OXYGEN SATURATION: 97 % | RESPIRATION RATE: 16 BRPM | BODY MASS INDEX: 35.03 KG/M2

## 2021-04-29 DIAGNOSIS — Z12.5 SPECIAL SCREENING FOR MALIGNANT NEOPLASM OF PROSTATE: ICD-10-CM

## 2021-04-29 DIAGNOSIS — E11.51 TYPE 2 DIABETES MELLITUS WITH PERIPHERAL VASCULAR DISEASE (H): ICD-10-CM

## 2021-04-29 DIAGNOSIS — M10.00 IDIOPATHIC GOUT, UNSPECIFIED CHRONICITY, UNSPECIFIED SITE: ICD-10-CM

## 2021-04-29 DIAGNOSIS — N40.1 HYPERTROPHY OF PROSTATE WITH URINARY OBSTRUCTION: ICD-10-CM

## 2021-04-29 DIAGNOSIS — Z13.6 CARDIOVASCULAR SCREENING; LDL GOAL LESS THAN 100: ICD-10-CM

## 2021-04-29 DIAGNOSIS — Z00.00 HEALTHCARE MAINTENANCE: Primary | ICD-10-CM

## 2021-04-29 DIAGNOSIS — I10 ESSENTIAL HYPERTENSION: ICD-10-CM

## 2021-04-29 DIAGNOSIS — N13.8 HYPERTROPHY OF PROSTATE WITH URINARY OBSTRUCTION: ICD-10-CM

## 2021-04-29 LAB
ERYTHROCYTE [DISTWIDTH] IN BLOOD BY AUTOMATED COUNT: 18.1 % (ref 10–15)
HBA1C MFR BLD: 8.1 % (ref 0–5.6)
HCT VFR BLD AUTO: 39.6 % (ref 40–53)
HGB BLD-MCNC: 12.2 G/DL (ref 13.3–17.7)
MCH RBC QN AUTO: 28.4 PG (ref 26.5–33)
MCHC RBC AUTO-ENTMCNC: 30.8 G/DL (ref 31.5–36.5)
MCV RBC AUTO: 92 FL (ref 78–100)
PLATELET # BLD AUTO: 250 10E9/L (ref 150–450)
RBC # BLD AUTO: 4.3 10E12/L (ref 4.4–5.9)
WBC # BLD AUTO: 8.5 10E9/L (ref 4–11)

## 2021-04-29 PROCEDURE — 85027 COMPLETE CBC AUTOMATED: CPT | Performed by: NURSE PRACTITIONER

## 2021-04-29 PROCEDURE — 80048 BASIC METABOLIC PNL TOTAL CA: CPT | Performed by: NURSE PRACTITIONER

## 2021-04-29 PROCEDURE — 84460 ALANINE AMINO (ALT) (SGPT): CPT | Performed by: NURSE PRACTITIONER

## 2021-04-29 PROCEDURE — 80061 LIPID PANEL: CPT | Performed by: NURSE PRACTITIONER

## 2021-04-29 PROCEDURE — G0103 PSA SCREENING: HCPCS | Performed by: NURSE PRACTITIONER

## 2021-04-29 PROCEDURE — 83036 HEMOGLOBIN GLYCOSYLATED A1C: CPT | Performed by: NURSE PRACTITIONER

## 2021-04-29 PROCEDURE — 36415 COLL VENOUS BLD VENIPUNCTURE: CPT | Performed by: NURSE PRACTITIONER

## 2021-04-29 PROCEDURE — G0438 PPPS, INITIAL VISIT: HCPCS | Performed by: NURSE PRACTITIONER

## 2021-04-29 RX ORDER — COLCHICINE 0.6 MG/1
0.6 CAPSULE ORAL 2 TIMES DAILY
Qty: 60 CAPSULE | Refills: 0 | Status: SHIPPED | OUTPATIENT
Start: 2021-04-29 | End: 2021-05-25

## 2021-04-29 ASSESSMENT — PAIN SCALES - GENERAL: PAINLEVEL: MILD PAIN (2)

## 2021-04-29 ASSESSMENT — ENCOUNTER SYMPTOMS
JOINT SWELLING: 1
FREQUENCY: 1
MYALGIAS: 1
ARTHRALGIAS: 1

## 2021-04-29 ASSESSMENT — ACTIVITIES OF DAILY LIVING (ADL): CURRENT_FUNCTION: NO ASSISTANCE NEEDED

## 2021-04-29 ASSESSMENT — MIFFLIN-ST. JEOR: SCORE: 1936

## 2021-04-29 NOTE — PROGRESS NOTES
"SUBJECTIVE:   Lance Ibrahim is a 76 year old male who presents for Preventive Visit.      Patient has been advised of split billing requirements and indicates understanding: Yes   Are you in the first 12 months of your Medicare coverage?  No    Healthy Habits:     In general, how would you rate your overall health?  Fair    Frequency of exercise:  None    Do you usually eat at least 4 servings of fruit and vegetables a day, include whole grains    & fiber and avoid regularly eating high fat or \"junk\" foods?  No    Taking medications regularly:  Yes    Medication side effects:  Other    Ability to successfully perform activities of daily living:  No assistance needed    Home Safety:  Lack of grab bars in the bathroom    Hearing Impairment:  Difficulty following a conversation in a noisy restaurant or crowded room, feel that people are mumbling or not speaking clearly, need to ask people to speak up or repeat themselves, find that men's voices are easier to understand than woman's and difficulty understanding soft or whispered speech    In the past 6 months, have you been bothered by leaking of urine?  No    In general, how would you rate your overall mental or emotional health?  Excellent      PHQ-2 Total Score: 0    Additional concerns today:  No    Do you feel safe in your environment? Yes    Have you ever done Advance Care Planning? (For example, a Health Directive, POLST, or a discussion with a medical provider or your loved ones about your wishes): Yes, advance care planning is on file.       Fall risk       Cognitive Screening   1) Repeat 3 items (Leader, Season, Table)    2) Clock draw: NORMAL  3) 3 item recall: Recalls 3 objects  Results: 3 items recalled: COGNITIVE IMPAIRMENT LESS LIKELY    Mini-CogTM Copyright MALINA Lee. Licensed by the author for use in [facility]; reprinted with permission ([email or web address]). All rights reserved.      Do you have sleep apnea, excessive snoring or daytime " drowsiness?: no    Reviewed and updated as needed this visit by clinical staff  Tobacco  Allergies  Meds   Med Hx  Surg Hx  Fam Hx  Soc Hx        Reviewed and updated as needed this visit by Provider                Social History     Tobacco Use     Smoking status: Former Smoker     Packs/day: 0.00     Quit date: 3/17/1993     Years since quittin.1     Smokeless tobacco: Never Used   Substance Use Topics     Alcohol use: Not Currently     If you drink alcohol do you typically have >3 drinks per day or >7 drinks per week? No    Alcohol Use 2021   Prescreen: >3 drinks/day or >7 drinks/week? Not Applicable   Prescreen: >3 drinks/day or >7 drinks/week? -           Diabetes Follow-up    How often are you checking your blood sugar? One time daily  What time of day are you checking your blood sugars (select all that apply)?  Before meals  Have you had any blood sugars above 200?  No  Have you had any blood sugars below 70?  No    What symptoms do you notice when your blood sugar is low?  None    What concerns do you have today about your diabetes? None     Do you have any of these symptoms? (Select all that apply)  No numbness or tingling in feet.  No redness, sores or blisters on feet.  No complaints of excessive thirst.  No reports of blurry vision.  No significant changes to weight.    Have you had a diabetic eye exam in the last 12 months? No          Hyperlipidemia Follow-Up      Are you regularly taking any medication or supplement to lower your cholesterol?   Yes- statin    Are you having muscle aches or other side effects that you think could be caused by your cholesterol lowering medication?  No    Hypertension Follow-up      Do you check your blood pressure regularly outside of the clinic? Yes     Are you following a low salt diet? Yes    Are your blood pressures ever more than 140 on the top number (systolic) OR more   than 90 on the bottom number (diastolic), for example 140/90? No    BP  Readings from Last 2 Encounters:   04/29/21 135/73   12/31/20 (!) 160/86     Hemoglobin A1C (%)   Date Value   07/27/2020 7.1 (H)   02/19/2020 7.7 (H)     LDL Cholesterol Calculated (mg/dL)   Date Value   06/25/2019 43   10/07/2017 72       Hypothyroidism Follow-up      Since last visit, patient describes the following symptoms: Weight stable, no hair loss, no skin changes, no constipation, no loose stools      Current providers sharing in care for this patient include:   Patient Care Team:  Anh Spivey NP as PCP - General (Nurse Practitioner - Adult Health)  Anh Spivey NP as Assigned PCP  Ayaka Azevedo DPM, Podiatry/Foot and Ankle Surgery as Assigned Musculoskeletal Provider  Stanley Ortega PA-C as Assigned Heart and Vascular Provider    The following health maintenance items are reviewed in Epic and correct as of today:  Health Maintenance Due   Topic Date Due     ANNUAL REVIEW OF HM ORDERS  Never done     HEPATITIS C SCREENING  Never done     ZOSTER IMMUNIZATION (1 of 2) Never done     MICROALBUMIN  03/05/2017     EYE EXAM  03/19/2020     LIPID  06/25/2020     DIABETIC FOOT EXAM  01/15/2021     A1C  01/27/2021     BMP  01/28/2021     MEDICARE ANNUAL WELLNESS VISIT  02/19/2021     FALL RISK ASSESSMENT  02/19/2021     BP Readings from Last 3 Encounters:   04/29/21 135/73   12/31/20 (!) 160/86   12/21/20 118/58    Wt Readings from Last 3 Encounters:   04/29/21 117.3 kg (258 lb 9.6 oz)   12/21/20 117.9 kg (260 lb)   12/20/20 117.9 kg (260 lb)                  Patient Active Problem List   Diagnosis     Ventral hernia     Chronic rhinitis     Hypertrophy of prostate with urinary obstruction     Transient cerebral ischemia     Essential hypertension     CARDIOVASCULAR SCREENING; LDL GOAL LESS THAN 100     Gout     Type 2 diabetes, HbA1c goal < 7% (H)     Cervicalgia     Amputated toe, left (H)     Type 2 diabetes mellitus with peripheral vascular disease (H)     Total knee replacement status      Dyspnea on exertion     Chest pain, unspecified type     Cardiomyopathy, unspecified type (H)     Shortness of breath     Renal colic     Obesity (BMI 35.0-39.9) with comorbidity (H)     CKD (chronic kidney disease) stage 3, GFR 30-59 ml/min     Ulcerated, foot (H)     Past Surgical History:   Procedure Laterality Date     AMPUTATE TOE(S) Left 10/15/2018    Procedure: AMPUTATE TOE(S);  Left third toe amputation;  Surgeon: Ayaka Azevedo DPM, Podiatry/Foot and Ankle Surgery;  Location: [facility] OR     ARTHROPLASTY KNEE Right 2018    Procedure: Right total knee arthroplasty;  Surgeon: Issa Cunha MD;  Location:  OR     COLONOSCOPY  2013    Procedure: COLONOSCOPY;  COLONOSCOPY;  Surgeon: Chau Hogan MD;  Location:  GI     CV HEART CATHETERIZATION WITH POSSIBLE INTERVENTION Left 2019    Procedure: Coronary Angiogram;  Surgeon: Nas Linda MD;  Location:  HEART CARDIAC CATH LAB     Diastasis recti repair  1985     EXTRACAPSULAR CATARACT EXTRATION WITH INTRAOCULAR LENS IMPLANT Left 2017     EXTRACAPSULAR CATARACT EXTRATION WITH INTRAOCULAR LENS IMPLANT Right      FOOT SURGERY  2013    cyst removal      HERNIA REPAIR  2004    ventral      ORIF Shoulder Left      Ventral hernia repair NOS  1987       Social History     Tobacco Use     Smoking status: Former Smoker     Packs/day: 0.00     Quit date: 3/17/1993     Years since quittin.1     Smokeless tobacco: Never Used   Substance Use Topics     Alcohol use: Not Currently     Family History   Problem Relation Age of Onset     Family History Negative Mother          86 yo     Cancer Father          76 yo brain     Diabetes Maternal Grandfather          91 yo     Alcohol/Drug Paternal Grandfather              Colon Cancer No family hx of          Current Outpatient Medications   Medication Sig Dispense Refill     amLODIPine (NORVASC) 5 MG tablet TAKE 1 TABLET BY MOUTH EVERY DAY 90  tablet 3     aspirin (ASA) 81 MG EC tablet Take 1 tablet (81 mg) by mouth daily 100 tablet 3     atorvastatin (LIPITOR) 40 MG tablet Take 1 tablet (40 mg) by mouth daily 60 tablet 0     colchicine (MITIGARE) 0.6 MG capsule Take 1 capsule (0.6 mg) by mouth 2 times daily 60 capsule 0     diclofenac (VOLTAREN) 1 % topical gel Place onto the skin 4 times daily       Ferrous Gluconate 240 (27 Fe) MG TABS Take 1 tablet by mouth daily        finasteride (PROSCAR) 5 MG tablet Take 5 mg by mouth daily.       fluticasone (FLONASE) 50 MCG/ACT spray Spray 1 spray into both nostrils nightly as needed        isosorbide mononitrate (IMDUR) 30 MG 24 hr tablet Take 1 tablet (30 mg) by mouth daily 90 tablet 0     lisinopril (ZESTRIL) 10 MG tablet TAKE 1 TABLET BY MOUTH EVERY DAY 90 tablet 1     loratadine (CLARITIN) 10 MG tablet Take 10 mg by mouth nightly as needed        metFORMIN (GLUCOPHAGE) 1000 MG tablet Take 1 tablet (1,000 mg) by mouth 2 times daily (with meals) 180 tablet 3     Multiple Vitamins-Minerals (MULTIVITAMIN ADULTS PO) Take 1 tablet by mouth daily        tamsulosin (FLOMAX) 0.4 MG 24 hr capsule Take 1 capsule by mouth daily.       torsemide (DEMADEX) 20 MG tablet TAKE 1 TABLET BY MOUTH EVERY DAY 90 tablet 3     Recent Labs   Lab Test 07/28/20  0626 07/27/20  1734 02/19/20  0741 02/19/20  0741 01/24/20  1359 01/24/20  1359 06/25/19  0754 06/25/19  0754 03/25/19  1647 05/01/18  1332 05/01/18  1332 10/07/17  1036 05/15/17  1714   A1C  --  7.1*  --  7.7*  --   --   --   --  6.3*   < >  --  6.3* 6.3*   LDL  --   --   --   --   --   --   --  43  --   --   --  72 86   HDL  --   --   --   --   --   --   --  63  --   --   --  65 53   TRIG  --   --   --   --   --   --   --  65  --   --   --  68 99   ALT 27  --   --   --   --  37  --  24  --   --  30  --   --    CR 1.19 1.30*   < >  --    < > 1.78*   < > Canceled, Test credited 1.50*   < > 0.93 0.88 0.88   GFRESTIMATED 59* 53*   < >  --    < > 36*   < > Canceled, Test  credited 45*   < > 80 85 85   GFRESTBLACK 68 61   < >  --    < > 42*   < > Canceled, Test credited 52*   < > >90 >90 >90  African American GFR Calc     POTASSIUM 4.5 3.9   < >  --    < > 5.0   < > Canceled, Test credited 4.7   < > 4.4 4.3 3.9   TSH  --   --   --   --   --  3.24  --   --   --   --  2.19  --   --     < > = values in this interval not displayed.            Pertinent mammograms are reviewed under the imaging tab.    Review of Systems   Cardiovascular: Positive for peripheral edema.   Genitourinary: Positive for frequency and impotence. Negative for discharge.   Musculoskeletal: Positive for arthralgias, joint swelling and myalgias.     Constitutional, HEENT, cardiovascular, pulmonary, GI, , musculoskeletal, neuro, skin, endocrine and psych systems are negative, except as otherwise noted.    OBJECTIVE:   /73 (BP Location: Right arm, Patient Position: Sitting, Cuff Size: Adult Large)   Pulse 109   Temp 97  F (36.1  C) (Oral)   Resp 16   Ht 1.829 m (6')   Wt 117.3 kg (258 lb 9.6 oz)   SpO2 97%   BMI 35.07 kg/m   Estimated body mass index is 35.07 kg/m  as calculated from the following:    Height as of this encounter: 1.829 m (6').    Weight as of this encounter: 117.3 kg (258 lb 9.6 oz).  Physical Exam  GENERAL: alert, no distress and obese  NECK: no adenopathy, no asymmetry, masses, or scars and thyroid normal to palpation  RESP: lungs clear to auscultation - no rales, rhonchi or wheezes  CV: regular rate and rhythm, normal S1 S2, no S3 or S4, no murmur, click or rub, no peripheral edema and peripheral pulses strong  ABDOMEN: soft, nontender, no hepatosplenomegaly, no masses and bowel sounds normal  MS: no gross musculoskeletal defects noted, no edema  PSYCH: mentation appears normal, affect normal/bright        ASSESSMENT / PLAN:       ICD-10-CM    1. Healthcare maintenance  Z00.00 CBC with platelets   2. Idiopathic gout, unspecified chronicity, unspecified site  M10.00 colchicine  (MITIGARE) 0.6 MG capsule   3. Type 2 diabetes mellitus with peripheral vascular disease (H)  E11.51 Hemoglobin A1c   4. Hypertrophy of prostate with urinary obstruction  N40.1     N13.8    5. Essential hypertension  I10 Basic metabolic panel   6. CARDIOVASCULAR SCREENING; LDL GOAL LESS THAN 100  Z13.6 ALT     Lipid Profile   7. Special screening for malignant neoplasm of prostate  Z12.5 Prostate spec antigen screen       Patient has been advised of split billing requirements and indicates understanding: Yes  COUNSELING:  Reviewed preventive health counseling, as reflected in patient instructions    Estimated body mass index is 35.07 kg/m  as calculated from the following:    Height as of this encounter: 1.829 m (6').    Weight as of this encounter: 117.3 kg (258 lb 9.6 oz).    Weight management plan: Discussed healthy diet and exercise guidelines    He reports that he quit smoking about 28 years ago. He smoked 0.00 packs per day. He has never used smokeless tobacco.      Appropriate preventive services were discussed with this patient, including applicable screening as appropriate for cardiovascular disease, diabetes, osteopenia/osteoporosis, and glaucoma.  As appropriate for age/gender, discussed screening for colorectal cancer, prostate cancer, breast cancer, and cervical cancer. Checklist reviewing preventive services available has been given to the patient.    Reviewed patients plan of care and provided an AVS. The Basic Care Plan (routine screening as documented in Health Maintenance) for Lance meets the Care Plan requirement. This Care Plan has been established and reviewed with the Patient.    Counseling Resources:  ATP IV Guidelines  Pooled Cohorts Equation Calculator  Breast Cancer Risk Calculator  Breast Cancer: Medication to Reduce Risk  FRAX Risk Assessment  ICSI Preventive Guidelines  Dietary Guidelines for Americans, 2010  USDA's MyPlate  ASA Prophylaxis  Lung CA Screening    ELANA Philippe  [facility]    Identified Health Risks:

## 2021-04-29 NOTE — NURSING NOTE
Patient here for a wellness visit.  Vital signs:  Temp: 97  F (36.1  C) Temp src: Oral BP: 135/73 Pulse: 109   Resp: 16 SpO2: 97 %     Height: 182.9 cm (6') Weight: 117.3 kg (258 lb 9.6 oz)  Estimated body mass index is 35.07 kg/m  as calculated from the following:    Height as of this encounter: 1.829 m (6').    Weight as of this encounter: 117.3 kg (258 lb 9.6 oz).

## 2021-04-30 ENCOUNTER — MYC MEDICAL ADVICE (OUTPATIENT)
Dept: INTERNAL MEDICINE | Facility: CLINIC | Age: 76
End: 2021-04-30

## 2021-04-30 LAB
ALT SERPL W P-5'-P-CCNC: 41 U/L (ref 0–70)
ANION GAP SERPL CALCULATED.3IONS-SCNC: 5 MMOL/L (ref 3–14)
BUN SERPL-MCNC: 27 MG/DL (ref 7–30)
CALCIUM SERPL-MCNC: 9.3 MG/DL (ref 8.5–10.1)
CHLORIDE SERPL-SCNC: 107 MMOL/L (ref 94–109)
CHOLEST SERPL-MCNC: 132 MG/DL
CO2 SERPL-SCNC: 27 MMOL/L (ref 20–32)
CREAT SERPL-MCNC: 1.19 MG/DL (ref 0.66–1.25)
GFR SERPL CREATININE-BSD FRML MDRD: 59 ML/MIN/{1.73_M2}
GLUCOSE SERPL-MCNC: 212 MG/DL (ref 70–99)
HDLC SERPL-MCNC: 53 MG/DL
LDLC SERPL CALC-MCNC: 48 MG/DL
NONHDLC SERPL-MCNC: 79 MG/DL
POTASSIUM SERPL-SCNC: 4.7 MMOL/L (ref 3.4–5.3)
PSA SERPL-ACNC: 0.17 UG/L (ref 0–4)
SODIUM SERPL-SCNC: 139 MMOL/L (ref 133–144)
TRIGL SERPL-MCNC: 153 MG/DL

## 2021-05-04 ENCOUNTER — TRANSFERRED RECORDS (OUTPATIENT)
Dept: HEALTH INFORMATION MANAGEMENT | Facility: CLINIC | Age: 76
End: 2021-05-04

## 2021-05-06 DIAGNOSIS — E11.9 TYPE 2 DIABETES MELLITUS WITHOUT COMPLICATION, WITHOUT LONG-TERM CURRENT USE OF INSULIN (H): ICD-10-CM

## 2021-05-06 DIAGNOSIS — I10 ESSENTIAL HYPERTENSION: ICD-10-CM

## 2021-05-07 RX ORDER — TORSEMIDE 20 MG/1
TABLET ORAL
Qty: 90 TABLET | Refills: 2 | Status: SHIPPED | OUTPATIENT
Start: 2021-05-07 | End: 2022-03-29

## 2021-05-07 NOTE — TELEPHONE ENCOUNTER
"Requested Prescriptions   Pending Prescriptions Disp Refills     torsemide (DEMADEX) 20 MG tablet [Pharmacy Med Name: TORSEMIDE 20 MG TABLET] 90 tablet 3     Sig: TAKE 1 TABLET BY MOUTH EVERY DAY       Diuretics (Including Combos) Protocol Passed - 5/6/2021  6:34 AM        Passed - Blood pressure under 140/90 in past 12 months     BP Readings from Last 3 Encounters:   04/29/21 135/73   12/31/20 (!) 160/86   12/21/20 118/58                 Passed - Recent (12 mo) or future (30 days) visit within the authorizing provider's specialty     Patient has had an office visit with the authorizing provider or a provider within the authorizing providers department within the previous 12 mos or has a future within next 30 days. See \"Patient Info\" tab in inbasket, or \"Choose Columns\" in Meds & Orders section of the refill encounter.              Passed - Medication is active on med list        Passed - Patient is age 18 or older        Passed - Normal serum creatinine on file in past 12 months     Recent Labs   Lab Test 04/29/21  1631   CR 1.19              Passed - Normal serum potassium on file in past 12 months     Recent Labs   Lab Test 04/29/21  1631   POTASSIUM 4.7                    Passed - Normal serum sodium on file in past 12 months     Recent Labs   Lab Test 04/29/21  1631                    metFORMIN (GLUCOPHAGE) 1000 MG tablet [Pharmacy Med Name: METFORMIN HCL 1,000 MG TABLET] 180 tablet 3     Sig: TAKE 1 TABLET BY MOUTH TWICE A DAY WITH MEALS       Biguanide Agents Passed - 5/6/2021  6:34 AM        Passed - Patient is age 10 or older        Passed - Patient has documented A1c within the specified period of time.     If HgbA1C is 8 or greater, it needs to be on file within the past 3 months.  If less than 8, must be on file within the past 6 months.     Recent Labs   Lab Test 04/29/21  1631   A1C 8.1*             Passed - Patient's CR is NOT>1.4 OR Patient's EGFR is NOT<45 within past 12 mos.     Recent Labs " "  Lab Test 04/29/21  1631   GFRESTIMATED 59*   GFRESTBLACK 68       Recent Labs   Lab Test 04/29/21  1631   CR 1.19             Passed - Patient does NOT have a diagnosis of CHF.        Passed - Medication is active on med list        Passed - Recent (6 mo) or future (30 days) visit within the authorizing provider's specialty     Patient had office visit in the last 6 months or has a visit in the next 30 days with authorizing provider or within the authorizing provider's specialty.  See \"Patient Info\" tab in inbasket, or \"Choose Columns\" in Meds & Orders section of the refill encounter.                 "

## 2021-05-11 DIAGNOSIS — I10 ESSENTIAL HYPERTENSION: ICD-10-CM

## 2021-05-11 RX ORDER — ATORVASTATIN CALCIUM 40 MG/1
40 TABLET, FILM COATED ORAL DAILY
Qty: 60 TABLET | Refills: 0 | Status: SHIPPED | OUTPATIENT
Start: 2021-05-11 | End: 2021-07-12

## 2021-05-23 DIAGNOSIS — M10.00 IDIOPATHIC GOUT, UNSPECIFIED CHRONICITY, UNSPECIFIED SITE: ICD-10-CM

## 2021-05-25 RX ORDER — COLCHICINE 0.6 MG/1
0.6 CAPSULE ORAL 2 TIMES DAILY
Qty: 60 CAPSULE | Refills: 0 | Status: SHIPPED | OUTPATIENT
Start: 2021-05-25 | End: 2021-07-02

## 2021-05-25 NOTE — TELEPHONE ENCOUNTER
Routing refill request to provider for review/approval because:  Labs not current:    Uric Acid   Date Value Ref Range Status   11/28/2019 10.8 (H) 3.5 - 7.2 mg/dL Final     Yris Miller RN, BSN

## 2021-06-07 DIAGNOSIS — R06.09 DYSPNEA ON EXERTION: ICD-10-CM

## 2021-06-07 DIAGNOSIS — I42.9 CARDIOMYOPATHY, UNSPECIFIED TYPE (H): ICD-10-CM

## 2021-06-07 DIAGNOSIS — R07.9 CHEST PAIN, UNSPECIFIED TYPE: ICD-10-CM

## 2021-06-07 RX ORDER — ISOSORBIDE MONONITRATE 30 MG/1
30 TABLET, EXTENDED RELEASE ORAL DAILY
Qty: 30 TABLET | Refills: 0 | Status: SHIPPED | OUTPATIENT
Start: 2021-06-07 | End: 2021-07-01

## 2021-06-30 DIAGNOSIS — R06.09 DYSPNEA ON EXERTION: ICD-10-CM

## 2021-06-30 DIAGNOSIS — I42.9 CARDIOMYOPATHY, UNSPECIFIED TYPE (H): ICD-10-CM

## 2021-06-30 DIAGNOSIS — R07.9 CHEST PAIN, UNSPECIFIED TYPE: ICD-10-CM

## 2021-06-30 RX ORDER — ISOSORBIDE MONONITRATE 30 MG/1
30 TABLET, EXTENDED RELEASE ORAL DAILY
Qty: 90 TABLET | Refills: 0 | Status: CANCELLED | OUTPATIENT
Start: 2021-06-30

## 2021-07-01 DIAGNOSIS — R07.9 CHEST PAIN, UNSPECIFIED TYPE: ICD-10-CM

## 2021-07-01 DIAGNOSIS — R06.09 DYSPNEA ON EXERTION: ICD-10-CM

## 2021-07-01 DIAGNOSIS — I42.9 CARDIOMYOPATHY, UNSPECIFIED TYPE (H): ICD-10-CM

## 2021-07-01 RX ORDER — ISOSORBIDE MONONITRATE 30 MG/1
30 TABLET, EXTENDED RELEASE ORAL DAILY
Qty: 90 TABLET | Refills: 0 | Status: SHIPPED | OUTPATIENT
Start: 2021-07-01 | End: 2021-10-25

## 2021-07-12 DIAGNOSIS — M10.00 IDIOPATHIC GOUT, UNSPECIFIED CHRONICITY, UNSPECIFIED SITE: ICD-10-CM

## 2021-07-12 DIAGNOSIS — I10 ESSENTIAL HYPERTENSION: ICD-10-CM

## 2021-07-12 RX ORDER — ATORVASTATIN CALCIUM 40 MG/1
40 TABLET, FILM COATED ORAL DAILY
Qty: 90 TABLET | Refills: 0 | Status: SHIPPED | OUTPATIENT
Start: 2021-07-12 | End: 2021-10-07

## 2021-07-14 NOTE — TELEPHONE ENCOUNTER
Pt asking for 90 days at a time.     Uric Acid   Date Value Ref Range Status   11/28/2019 10.8 (H) 3.5 - 7.2 mg/dL Final

## 2021-07-15 RX ORDER — COLCHICINE 0.6 MG/1
0.6 CAPSULE ORAL 2 TIMES DAILY
Qty: 180 CAPSULE | Refills: 1 | Status: SHIPPED | OUTPATIENT
Start: 2021-07-15 | End: 2021-12-03

## 2021-07-27 PROCEDURE — 99285 EMERGENCY DEPT VISIT HI MDM: CPT | Mod: 25

## 2021-07-28 ENCOUNTER — APPOINTMENT (OUTPATIENT)
Dept: GENERAL RADIOLOGY | Facility: CLINIC | Age: 76
DRG: 638 | End: 2021-07-28
Attending: EMERGENCY MEDICINE
Payer: COMMERCIAL

## 2021-07-28 ENCOUNTER — HOSPITAL ENCOUNTER (INPATIENT)
Facility: CLINIC | Age: 76
LOS: 1 days | Discharge: HOME | DRG: 638 | End: 2021-07-29
Attending: EMERGENCY MEDICINE | Admitting: INTERNAL MEDICINE
Payer: COMMERCIAL

## 2021-07-28 DIAGNOSIS — L03.119 CELLULITIS OF FOOT: ICD-10-CM

## 2021-07-28 LAB
ANION GAP SERPL CALCULATED.3IONS-SCNC: 5 MMOL/L (ref 3–14)
BASOPHILS # BLD AUTO: 0.1 10E3/UL (ref 0–0.2)
BASOPHILS NFR BLD AUTO: 1 %
BUN SERPL-MCNC: 24 MG/DL (ref 7–30)
CALCIUM SERPL-MCNC: 9.1 MG/DL (ref 8.5–10.1)
CHLORIDE BLD-SCNC: 108 MMOL/L (ref 94–109)
CO2 SERPL-SCNC: 26 MMOL/L (ref 20–32)
CREAT SERPL-MCNC: 1.29 MG/DL (ref 0.66–1.25)
CRP SERPL-MCNC: 84.2 MG/L (ref 0–8)
EOSINOPHIL # BLD AUTO: 0.3 10E3/UL (ref 0–0.7)
EOSINOPHIL NFR BLD AUTO: 4 %
ERYTHROCYTE [DISTWIDTH] IN BLOOD BY AUTOMATED COUNT: 15.4 % (ref 10–15)
GFR SERPL CREATININE-BSD FRML MDRD: 53 ML/MIN/1.73M2
GLUCOSE BLD-MCNC: 132 MG/DL (ref 70–99)
GLUCOSE BLDC GLUCOMTR-MCNC: 131 MG/DL (ref 70–99)
GLUCOSE BLDC GLUCOMTR-MCNC: 156 MG/DL (ref 70–99)
GLUCOSE BLDC GLUCOMTR-MCNC: 209 MG/DL (ref 70–99)
GLUCOSE BLDC GLUCOMTR-MCNC: 250 MG/DL (ref 70–99)
HCT VFR BLD AUTO: 38.1 % (ref 40–53)
HGB BLD-MCNC: 12.1 G/DL (ref 13.3–17.7)
HOLD SPECIMEN: NORMAL
HOLD SPECIMEN: NORMAL
IMM GRANULOCYTES # BLD: 0.1 10E3/UL
IMM GRANULOCYTES NFR BLD: 1 %
LYMPHOCYTES # BLD AUTO: 2.7 10E3/UL (ref 0.8–5.3)
LYMPHOCYTES NFR BLD AUTO: 30 %
MCH RBC QN AUTO: 30.9 PG (ref 26.5–33)
MCHC RBC AUTO-ENTMCNC: 31.8 G/DL (ref 31.5–36.5)
MCV RBC AUTO: 97 FL (ref 78–100)
MONOCYTES # BLD AUTO: 0.9 10E3/UL (ref 0–1.3)
MONOCYTES NFR BLD AUTO: 10 %
NEUTROPHILS # BLD AUTO: 4.9 10E3/UL (ref 1.6–8.3)
NEUTROPHILS NFR BLD AUTO: 54 %
NRBC # BLD AUTO: 0 10E3/UL
NRBC BLD AUTO-RTO: 0 /100
PLATELET # BLD AUTO: 264 10E3/UL (ref 150–450)
POTASSIUM BLD-SCNC: 4.4 MMOL/L (ref 3.4–5.3)
RBC # BLD AUTO: 3.92 10E6/UL (ref 4.4–5.9)
SARS-COV-2 RNA RESP QL NAA+PROBE: NEGATIVE
SODIUM SERPL-SCNC: 139 MMOL/L (ref 133–144)
WBC # BLD AUTO: 8.9 10E3/UL (ref 4–11)

## 2021-07-28 PROCEDURE — 250N000011 HC RX IP 250 OP 636: Performed by: EMERGENCY MEDICINE

## 2021-07-28 PROCEDURE — 99223 1ST HOSP IP/OBS HIGH 75: CPT | Mod: AI | Performed by: INTERNAL MEDICINE

## 2021-07-28 PROCEDURE — C9803 HOPD COVID-19 SPEC COLLECT: HCPCS

## 2021-07-28 PROCEDURE — 87186 SC STD MICRODIL/AGAR DIL: CPT | Performed by: EMERGENCY MEDICINE

## 2021-07-28 PROCEDURE — 250N000012 HC RX MED GY IP 250 OP 636 PS 637: Performed by: INTERNAL MEDICINE

## 2021-07-28 PROCEDURE — 250N000011 HC RX IP 250 OP 636: Performed by: INTERNAL MEDICINE

## 2021-07-28 PROCEDURE — 87040 BLOOD CULTURE FOR BACTERIA: CPT | Performed by: EMERGENCY MEDICINE

## 2021-07-28 PROCEDURE — 86140 C-REACTIVE PROTEIN: CPT | Performed by: EMERGENCY MEDICINE

## 2021-07-28 PROCEDURE — 120N000001 HC R&B MED SURG/OB

## 2021-07-28 PROCEDURE — 250N000013 HC RX MED GY IP 250 OP 250 PS 637: Performed by: INTERNAL MEDICINE

## 2021-07-28 PROCEDURE — 97602 WOUND(S) CARE NON-SELECTIVE: CPT

## 2021-07-28 PROCEDURE — 36415 COLL VENOUS BLD VENIPUNCTURE: CPT | Performed by: EMERGENCY MEDICINE

## 2021-07-28 PROCEDURE — G0463 HOSPITAL OUTPT CLINIC VISIT: HCPCS | Mod: 25

## 2021-07-28 PROCEDURE — 99222 1ST HOSP IP/OBS MODERATE 55: CPT | Performed by: PODIATRIST

## 2021-07-28 PROCEDURE — 96365 THER/PROPH/DIAG IV INF INIT: CPT

## 2021-07-28 PROCEDURE — 87635 SARS-COV-2 COVID-19 AMP PRB: CPT | Performed by: EMERGENCY MEDICINE

## 2021-07-28 PROCEDURE — 85025 COMPLETE CBC W/AUTO DIFF WBC: CPT | Performed by: EMERGENCY MEDICINE

## 2021-07-28 PROCEDURE — 96375 TX/PRO/DX INJ NEW DRUG ADDON: CPT

## 2021-07-28 PROCEDURE — 80048 BASIC METABOLIC PNL TOTAL CA: CPT | Performed by: EMERGENCY MEDICINE

## 2021-07-28 PROCEDURE — 73630 X-RAY EXAM OF FOOT: CPT | Mod: RT

## 2021-07-28 RX ORDER — NALOXONE HYDROCHLORIDE 0.4 MG/ML
0.4 INJECTION, SOLUTION INTRAMUSCULAR; INTRAVENOUS; SUBCUTANEOUS
Status: DISCONTINUED | OUTPATIENT
Start: 2021-07-28 | End: 2021-07-29 | Stop reason: HOSPADM

## 2021-07-28 RX ORDER — FENTANYL CITRATE 50 UG/ML
50 INJECTION, SOLUTION INTRAMUSCULAR; INTRAVENOUS ONCE
Status: COMPLETED | OUTPATIENT
Start: 2021-07-28 | End: 2021-07-28

## 2021-07-28 RX ORDER — LORATADINE 10 MG/1
10 TABLET ORAL
Status: DISCONTINUED | OUTPATIENT
Start: 2021-07-28 | End: 2021-07-29 | Stop reason: HOSPADM

## 2021-07-28 RX ORDER — CLINDAMYCIN PHOSPHATE 900 MG/50ML
900 INJECTION, SOLUTION INTRAVENOUS EVERY 8 HOURS
Status: DISCONTINUED | OUTPATIENT
Start: 2021-07-28 | End: 2021-07-29 | Stop reason: HOSPADM

## 2021-07-28 RX ORDER — TORSEMIDE 20 MG/1
20 TABLET ORAL DAILY
Status: DISCONTINUED | OUTPATIENT
Start: 2021-07-28 | End: 2021-07-29 | Stop reason: HOSPADM

## 2021-07-28 RX ORDER — AMOXICILLIN 250 MG
2 CAPSULE ORAL 2 TIMES DAILY PRN
Status: DISCONTINUED | OUTPATIENT
Start: 2021-07-28 | End: 2021-07-29 | Stop reason: HOSPADM

## 2021-07-28 RX ORDER — FERROUS GLUCONATE 324(38)MG
324 TABLET ORAL DAILY
Status: DISCONTINUED | OUTPATIENT
Start: 2021-07-28 | End: 2021-07-29 | Stop reason: HOSPADM

## 2021-07-28 RX ORDER — NALOXONE HYDROCHLORIDE 0.4 MG/ML
0.2 INJECTION, SOLUTION INTRAMUSCULAR; INTRAVENOUS; SUBCUTANEOUS
Status: DISCONTINUED | OUTPATIENT
Start: 2021-07-28 | End: 2021-07-29 | Stop reason: HOSPADM

## 2021-07-28 RX ORDER — CLINDAMYCIN PHOSPHATE 600 MG/50ML
600 INJECTION, SOLUTION INTRAVENOUS ONCE
Status: COMPLETED | OUTPATIENT
Start: 2021-07-28 | End: 2021-07-28

## 2021-07-28 RX ORDER — ONDANSETRON 2 MG/ML
4 INJECTION INTRAMUSCULAR; INTRAVENOUS EVERY 6 HOURS PRN
Status: DISCONTINUED | OUTPATIENT
Start: 2021-07-28 | End: 2021-07-29 | Stop reason: HOSPADM

## 2021-07-28 RX ORDER — ACETAMINOPHEN 650 MG/1
650 SUPPOSITORY RECTAL EVERY 6 HOURS PRN
Status: DISCONTINUED | OUTPATIENT
Start: 2021-07-28 | End: 2021-07-29 | Stop reason: HOSPADM

## 2021-07-28 RX ORDER — AMOXICILLIN 250 MG
1 CAPSULE ORAL 2 TIMES DAILY PRN
Status: DISCONTINUED | OUTPATIENT
Start: 2021-07-28 | End: 2021-07-29 | Stop reason: HOSPADM

## 2021-07-28 RX ORDER — LISINOPRIL 10 MG/1
10 TABLET ORAL DAILY
Status: DISCONTINUED | OUTPATIENT
Start: 2021-07-28 | End: 2021-07-29 | Stop reason: HOSPADM

## 2021-07-28 RX ORDER — ONDANSETRON 4 MG/1
4 TABLET, ORALLY DISINTEGRATING ORAL EVERY 6 HOURS PRN
Status: DISCONTINUED | OUTPATIENT
Start: 2021-07-28 | End: 2021-07-29 | Stop reason: HOSPADM

## 2021-07-28 RX ORDER — DEXTROSE MONOHYDRATE 25 G/50ML
25-50 INJECTION, SOLUTION INTRAVENOUS
Status: DISCONTINUED | OUTPATIENT
Start: 2021-07-28 | End: 2021-07-29 | Stop reason: HOSPADM

## 2021-07-28 RX ORDER — FINASTERIDE 5 MG/1
5 TABLET, FILM COATED ORAL DAILY
Status: DISCONTINUED | OUTPATIENT
Start: 2021-07-28 | End: 2021-07-29 | Stop reason: HOSPADM

## 2021-07-28 RX ORDER — ATORVASTATIN CALCIUM 40 MG/1
40 TABLET, FILM COATED ORAL DAILY
Status: DISCONTINUED | OUTPATIENT
Start: 2021-07-28 | End: 2021-07-29 | Stop reason: HOSPADM

## 2021-07-28 RX ORDER — LANOLIN ALCOHOL/MO/W.PET/CERES
3 CREAM (GRAM) TOPICAL
Status: DISCONTINUED | OUTPATIENT
Start: 2021-07-28 | End: 2021-07-29 | Stop reason: HOSPADM

## 2021-07-28 RX ORDER — NICOTINE POLACRILEX 4 MG
15-30 LOZENGE BUCCAL
Status: DISCONTINUED | OUTPATIENT
Start: 2021-07-28 | End: 2021-07-29 | Stop reason: HOSPADM

## 2021-07-28 RX ORDER — LIDOCAINE 40 MG/G
CREAM TOPICAL
Status: DISCONTINUED | OUTPATIENT
Start: 2021-07-28 | End: 2021-07-29 | Stop reason: HOSPADM

## 2021-07-28 RX ORDER — COLCHICINE 0.6 MG/1
0.6 TABLET ORAL 2 TIMES DAILY
Status: DISCONTINUED | OUTPATIENT
Start: 2021-07-28 | End: 2021-07-29 | Stop reason: HOSPADM

## 2021-07-28 RX ORDER — MAGNESIUM HYDROXIDE/ALUMINUM HYDROXICE/SIMETHICONE 120; 1200; 1200 MG/30ML; MG/30ML; MG/30ML
30 SUSPENSION ORAL EVERY 4 HOURS PRN
Status: DISCONTINUED | OUTPATIENT
Start: 2021-07-28 | End: 2021-07-29 | Stop reason: HOSPADM

## 2021-07-28 RX ORDER — TAMSULOSIN HYDROCHLORIDE 0.4 MG/1
0.4 CAPSULE ORAL DAILY
Status: DISCONTINUED | OUTPATIENT
Start: 2021-07-28 | End: 2021-07-29 | Stop reason: HOSPADM

## 2021-07-28 RX ORDER — AMLODIPINE BESYLATE 5 MG/1
5 TABLET ORAL DAILY
Status: DISCONTINUED | OUTPATIENT
Start: 2021-07-28 | End: 2021-07-29 | Stop reason: HOSPADM

## 2021-07-28 RX ORDER — ISOSORBIDE MONONITRATE 30 MG/1
30 TABLET, EXTENDED RELEASE ORAL DAILY
Status: DISCONTINUED | OUTPATIENT
Start: 2021-07-28 | End: 2021-07-29 | Stop reason: HOSPADM

## 2021-07-28 RX ORDER — ACETAMINOPHEN 325 MG/1
650 TABLET ORAL EVERY 6 HOURS PRN
Status: DISCONTINUED | OUTPATIENT
Start: 2021-07-28 | End: 2021-07-29 | Stop reason: HOSPADM

## 2021-07-28 RX ORDER — OXYCODONE HYDROCHLORIDE 5 MG/1
5 TABLET ORAL EVERY 4 HOURS PRN
Status: DISCONTINUED | OUTPATIENT
Start: 2021-07-28 | End: 2021-07-29 | Stop reason: HOSPADM

## 2021-07-28 RX ORDER — FLUTICASONE PROPIONATE 50 MCG
1 SPRAY, SUSPENSION (ML) NASAL
Status: DISCONTINUED | OUTPATIENT
Start: 2021-07-28 | End: 2021-07-29 | Stop reason: HOSPADM

## 2021-07-28 RX ORDER — ASPIRIN 81 MG/1
81 TABLET ORAL DAILY
Status: DISCONTINUED | OUTPATIENT
Start: 2021-07-28 | End: 2021-07-29 | Stop reason: HOSPADM

## 2021-07-28 RX ORDER — HEPARIN SODIUM 5000 [USP'U]/.5ML
5000 INJECTION, SOLUTION INTRAVENOUS; SUBCUTANEOUS EVERY 12 HOURS
Status: DISCONTINUED | OUTPATIENT
Start: 2021-07-28 | End: 2021-07-29 | Stop reason: HOSPADM

## 2021-07-28 RX ADMIN — HEPARIN SODIUM 5000 UNITS: 10000 INJECTION, SOLUTION INTRAVENOUS; SUBCUTANEOUS at 21:43

## 2021-07-28 RX ADMIN — TORSEMIDE 20 MG: 20 TABLET ORAL at 12:48

## 2021-07-28 RX ADMIN — FERROUS GLUCONATE 324 MG: 324 TABLET ORAL at 12:48

## 2021-07-28 RX ADMIN — ASPIRIN 81 MG: 81 TABLET ORAL at 12:48

## 2021-07-28 RX ADMIN — CLINDAMYCIN PHOSPHATE 600 MG: 600 INJECTION, SOLUTION INTRAVENOUS at 04:35

## 2021-07-28 RX ADMIN — COLCHICINE 0.6 MG: 0.6 TABLET, FILM COATED ORAL at 20:04

## 2021-07-28 RX ADMIN — CLINDAMYCIN PHOSPHATE 900 MG: 900 INJECTION, SOLUTION INTRAVENOUS at 20:04

## 2021-07-28 RX ADMIN — HEPARIN SODIUM 5000 UNITS: 10000 INJECTION, SOLUTION INTRAVENOUS; SUBCUTANEOUS at 11:06

## 2021-07-28 RX ADMIN — AMLODIPINE BESYLATE 5 MG: 5 TABLET ORAL at 12:48

## 2021-07-28 RX ADMIN — ISOSORBIDE MONONITRATE 30 MG: 30 TABLET, EXTENDED RELEASE ORAL at 12:49

## 2021-07-28 RX ADMIN — TAMSULOSIN HYDROCHLORIDE 0.4 MG: 0.4 CAPSULE ORAL at 12:48

## 2021-07-28 RX ADMIN — ATORVASTATIN CALCIUM 40 MG: 40 TABLET, FILM COATED ORAL at 12:48

## 2021-07-28 RX ADMIN — FENTANYL CITRATE 50 MCG: 50 INJECTION, SOLUTION INTRAMUSCULAR; INTRAVENOUS at 03:44

## 2021-07-28 RX ADMIN — FINASTERIDE 5 MG: 5 TABLET, FILM COATED ORAL at 12:48

## 2021-07-28 RX ADMIN — LISINOPRIL 10 MG: 10 TABLET ORAL at 12:49

## 2021-07-28 RX ADMIN — CLINDAMYCIN PHOSPHATE 900 MG: 900 INJECTION, SOLUTION INTRAVENOUS at 12:44

## 2021-07-28 RX ADMIN — INSULIN ASPART 3 UNITS: 100 INJECTION, SOLUTION INTRAVENOUS; SUBCUTANEOUS at 13:40

## 2021-07-28 RX ADMIN — FLUTICASONE PROPIONATE 1 SPRAY: 50 SPRAY, METERED NASAL at 22:34

## 2021-07-28 RX ADMIN — INSULIN ASPART 1 UNITS: 100 INJECTION, SOLUTION INTRAVENOUS; SUBCUTANEOUS at 09:12

## 2021-07-28 ASSESSMENT — ENCOUNTER SYMPTOMS
CHILLS: 0
FEVER: 0
COLOR CHANGE: 1
ARTHRALGIAS: 1
WOUND: 1

## 2021-07-28 ASSESSMENT — ACTIVITIES OF DAILY LIVING (ADL)
ADLS_ACUITY_SCORE: 13

## 2021-07-28 ASSESSMENT — MIFFLIN-ST. JEOR: SCORE: 1904.25

## 2021-07-28 NOTE — PLAN OF CARE
A/O.  CMS at baseline, reports decreased sensation to bilat feet.  BLE swollen, right more than left.  Redness to right foot and left pinky toe remains within marked area.  4 open areas noted, 2 to bilat pinky toes, one to right dorsal foot, and one to plantar side of right great toe. Wound care done per order. BLE elevated. Continues on IV Cleocin, will discharge home likely tomorrow on PO antibiotics.  Up ad quinten in room.  Denies need for pain medication. Will continue to monitor.

## 2021-07-28 NOTE — PROGRESS NOTES
Patient seen and examined.  Chart reviewed.  Plan unchanged.  Please see admission history and physical from earlier today by Dr. Leary for details.    Briefly, 76-year-old male with past medical history significant for hypertension, diabetes mellitus, hyperlipidemia, gout, sleep apnea, cardiomyopathy, BPH, and chronic kidney disease.  Presented to emergency room with right foot and left fifth toe cellulitis.  Started on IV clindamycin.    1.  Right foot and left fifth toe cellulitis.  Associated with diabetic foot ulcers.  Seen in consultation by podiatry.  Already showing some improvement with IV clindamycin.  Continue IV clindamycin today.

## 2021-07-28 NOTE — ED TRIAGE NOTES
B foot blisters after wearing new shoes. Blisters on top of Rt foot is erythematous and draining.  Pt concerned for infection.  Pt has history of toe amputations d/t foot wounds.

## 2021-07-28 NOTE — ED NOTES
"[facility]  ED Nurse Handoff Report    Lance Ibrahim is a 76 year old male   ED Chief complaint: Foot Pain  . ED Diagnosis:   Final diagnoses:   Cellulitis of foot     Allergies:   Allergies   Allergen Reactions     Penicillins Anaphylaxis     \"anaphylactic\"     Sulfa Drugs      \"itchy rash, swelling of face and hands\"     Ancef [Cefazolin] Rash       Code Status: Full Code  Activity level - Baseline/Home:  Independent. Activity Level - Current:   Stand by Assist. Lift room needed: No. Bariatric: No   Needed: No   Isolation: No. Infection: Not Applicable.     Vital Signs:   Vitals:    07/28/21 0009 07/28/21 0512   BP: (!) 147/81 (!) 151/86   Pulse: 98 92   Resp: 18 19   Temp: 98.8  F (37.1  C)    TempSrc: Temporal    SpO2: 99% 100%       Cardiac Rhythm:  ,      Pain level:    Patient confused: No. Patient Falls Risk: Yes.   Elimination Status: Has voided   Patient Report - Initial Complaint: B foot blisters after wearing new shoes. Blisters on top of Rt foot is erythematous and draining.  Pt concerned for infection.  Pt has history of toe amputations d/t foot wounds. Focused Assessment:Skin - Skin WDL: .WDL except  Skin Comment: erythema observed to dorsal foot. Ulceration with purulent drainage present.   Tests Performed: Lab, xray. Abnormal Results:   Labs Ordered and Resulted from Time of ED Arrival Up to the Time of Departure from the ED   BASIC METABOLIC PANEL - Abnormal; Notable for the following components:       Result Value    Creatinine 1.29 (*)     Glucose 132 (*)     GFR Estimate 53 (*)     All other components within normal limits   CRP INFLAMMATION - Abnormal; Notable for the following components:    CRP Inflammation 84.2 (*)     All other components within normal limits   CBC WITH PLATELETS AND DIFFERENTIAL - Abnormal; Notable for the following components:    RBC Count 3.92 (*)     Hemoglobin 12.1 (*)     Hematocrit 38.1 (*)     RDW 15.4 (*)     Absolute Immature Granulocytes " 0.1 (*)     All other components within normal limits   EXTRA BLUE TOP TUBE   EXTRA GREEN TOP (LITHIUM HEPARIN) ON ICE   LACTIC ACID WHOLE BLOOD   ERYTHROCYTE SEDIMENTATION RATE AUTO   BLOOD CULTURE   BLOOD CULTURE   AEROBIC BACTERIAL CULTURE ROUTINE   COVID-19 VIRUS (CORONAVIRUS) BY PCR   CBC WITH PLATELETS & DIFFERENTIAL    Narrative:     The following orders were created for panel order CBC with platelets differential.  Procedure                               Abnormality         Status                     ---------                               -----------         ------                     CBC with platelets and d...[[id number]]  Abnormal            Final result                 Please view results for these tests on the individual orders.   EXTRA TUBE    Narrative:     The following orders were created for panel order Extra Tube ([place] Draw).  Procedure                               Abnormality         Status                     ---------                               -----------         ------                     Extra Blue Top Tube[[id number]]                              Final result               Extra Green Top (Lithium...[[id number]]                      Final result                 Please view results for these tests on the individual orders.     Foot  XR, G/E 3 views, right   Final Result   IMPRESSION: No fracture or dislocation. Arthritis in the mid foot and first MTP joint. No bone destruction to suggest osteomyelitis. Soft tissue swelling over the foot.        .   Treatments provided: Started on Clinda  Family Comments: none present.  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  OBS brochure/video discussed/provided to patient:  N/A  ED Medications:   Medications   fentaNYL (PF) (SUBLIMAZE) injection 50 mcg (50 mcg Intravenous Given 7/28/21 2685)   clindamycin (CLEOCIN) infusion 600 mg (0 mg Intravenous Stopped 7/28/21 7963)     Drips infusing:  No  For the majority of the shift, the patient's behavior  Green. Interventions performed were none.    Sepsis treatment initiated: No     Patient tested for COVID 19 prior to admission: YES    ED Nurse Name/Phone Number: Kiel Hernandez RN,   5:14 AM    RECEIVING UNIT ED HANDOFF REVIEW    Above ED Nurse Handoff Report was reviewed: Yes  Reviewed by: Candy Magdaleno RN on July 28, 2021 at 7:44 AM

## 2021-07-28 NOTE — CONSULTS
Foot & Ankle Surgery  July 28, 2021    CC: Bilateral diabetic wounds with associated cellulitis    I was asked to see Lance Ibrahim regarding the chief complaint by:  Dr. PATO Leary    HPI:  Pt is a 76 year old male who presents with above complaint.  Admission for bilateral foot infection in setting of diabetes mellitus with peripheral neuropathy and diabetic wounds.  He gone to diabetic shoes approximately week ago and developed pressure sores on the fifth toe bilateral dorsal right foot. Yesterday, he started developing worsening redness, swelling and pain in the feet, and was admitted for infection work-up and antibiotic therapy.  Admission xrays of the right foot did not demonstrate any soft tissue gas or lytic/erosive changes in the bone.  He did undergo left 3rd toe amputation by Dr Azevedo 10/15/18 that healed uneventfully.  He states the redness, swelling and pain have improved.  He is currently on IV Clindamycin.  Previous cultures from 2018 grew MSSA and Finegoldia magna/Peptostreptococcus    ROS:   Pos for CC.  The patient denies current nausea, vomiting, chills, fevers, belly pain, calf pain, chest pain or SOB.  Complete remainder of ROS is otherwise neg.    VITALS:    Vitals:    07/28/21 0009 07/28/21 0512 07/28/21 0808   BP: (!) 147/81 (!) 151/86 (!) 152/77   Pulse: 98 92 73   Resp: 18 19 18   Temp: 98.8  F (37.1  C)  97.6  F (36.4  C)   TempSrc: Temporal  Temporal   SpO2: 99% 100% 97%   Weight:   114.1 kg (251 lb 9.6 oz)   Height:   1.829 m (6')       PMH:    Past Medical History:   Diagnosis Date     Arthritis     osteoarthritis knees     BPH      CAD (coronary artery disease)     subtotal occlusion in the small distal LAD      Cardiomyopathy      Cerebral artery occlusion with cerebral infarction     TIA 1993, no residual     Chronic kidney disease      Chronic rhinitis      HTN (hypertension)      Hyperlipidemia      Kidney stone      PVD (peripheral vascular disease)      Sleep apnea     doesn't use  cpap     TIA (transient ischaemic attack) 1993     Type 2 diabetes mellitus - 11/08/2018     Ureteral stone        SXHX:    Past Surgical History:   Procedure Laterality Date     AMPUTATE TOE(S) Left 10/15/2018    Procedure: AMPUTATE TOE(S);  Left third toe amputation;  Surgeon: Ayaka Azevedo DPM, Podiatry/Foot and Ankle Surgery;  Location: [facility] OR     ARTHROPLASTY KNEE Right 12/07/2018    Procedure: Right total knee arthroplasty;  Surgeon: Issa Cunha MD;  Location: [facility] OR     COLONOSCOPY  03/09/2013    Procedure: COLONOSCOPY;  COLONOSCOPY;  Surgeon: Chau Hogan MD;  Location:  GI     CV HEART CATHETERIZATION WITH POSSIBLE INTERVENTION Left 03/05/2019    Procedure: Coronary Angiogram;  Surgeon: Nas Linda MD;  Location:  HEART CARDIAC CATH LAB     Diastasis recti repair  1985     EXTRACAPSULAR CATARACT EXTRATION WITH INTRAOCULAR LENS IMPLANT Left 03/13/2017     EXTRACAPSULAR CATARACT EXTRATION WITH INTRAOCULAR LENS IMPLANT Right      FOOT SURGERY  04/2013    cyst removal      HERNIA REPAIR  07/13/2004    ventral      ORIF Shoulder Left      Ventral hernia repair NOS  1987        MEDS:    Current Facility-Administered Medications   Medication     acetaminophen (TYLENOL) tablet 650 mg    Or     acetaminophen (TYLENOL) Suppository 650 mg     alum & mag hydroxide-simethicone (MAALOX) suspension 30 mL     amLODIPine (NORVASC) tablet 5 mg     aspirin EC tablet 81 mg     atorvastatin (LIPITOR) tablet 40 mg     Cadexomer Iodine (topical) 0.9% (IODOSORB) 0.9 % gel     clindamycin (CLEOCIN) infusion 900 mg     colchicine (MITIGARE) capsule 0.6 mg     glucose gel 15-30 g    Or     dextrose 50 % injection 25-50 mL    Or     glucagon injection 1 mg     Ferrous Gluconate TABS 1 tablet     finasteride (PROSCAR) tablet 5 mg     fluticasone (FLONASE) 50 MCG/ACT spray 1 spray     heparin ANTICOAGULANT injection 5,000 Units     insulin aspart (NovoLOG) injection (RAPID ACTING)     insulin aspart (NovoLOG)  "injection (RAPID ACTING)     isosorbide mononitrate (IMDUR) 24 hr tablet 30 mg     lidocaine (LMX4) cream     lidocaine 1 % 0.1-1 mL     lisinopril (ZESTRIL) tablet 10 mg     loratadine (CLARITIN) tablet 10 mg     magnesium hydroxide (MILK OF MAGNESIA) suspension 30 mL     melatonin tablet 3 mg     naloxone (NARCAN) injection 0.2 mg    Or     naloxone (NARCAN) injection 0.4 mg    Or     naloxone (NARCAN) injection 0.2 mg    Or     naloxone (NARCAN) injection 0.4 mg     ondansetron (ZOFRAN-ODT) ODT tab 4 mg    Or     ondansetron (ZOFRAN) injection 4 mg     oxyCODONE (ROXICODONE) tablet 5 mg     senna-docusate (SENOKOT-S/PERICOLACE) 8.6-50 MG per tablet 1 tablet    Or     senna-docusate (SENOKOT-S/PERICOLACE) 8.6-50 MG per tablet 2 tablet     sodium chloride (PF) 0.9% PF flush 3 mL     sodium chloride (PF) 0.9% PF flush 3 mL     tamsulosin (FLOMAX) capsule 0.4 mg     torsemide (DEMADEX) tablet 20 mg       ALL:     Allergies   Allergen Reactions     Penicillins Anaphylaxis     \"anaphylactic\"     Sulfa Drugs      \"itchy rash, swelling of face and hands\"     Ancef [Cefazolin] Rash       FMH:    Family History   Problem Relation Age of Onset     Family History Negative Mother          88 yo     Cancer Father          74 yo brain     Diabetes Maternal Grandfather          91 yo     Alcohol/Drug Paternal Grandfather              Colon Cancer No family hx of        SocHx:    Social History     Socioeconomic History     Marital status:      Spouse name: Not on file     Number of children: Not on file     Years of education: Not on file     Highest education level: Not on file   Occupational History     Employer: SELF   Tobacco Use     Smoking status: Former Smoker     Packs/day: 0.00     Quit date: 3/17/1993     Years since quittin.3     Smokeless tobacco: Never Used   Substance and Sexual Activity     Alcohol use: Not Currently     Drug use: No     Sexual activity: Never   Other " Topics Concern     Parent/sibling w/ CABG, MI or angioplasty before 65F 55M? Not Asked   Social History Narrative     Not on file     Social Determinants of Health     Financial Resource Strain:      Difficulty of Paying Living Expenses:    Food Insecurity:      Worried About Running Out of Food in the Last Year:      Ran Out of Food in the Last Year:    Transportation Needs:      Lack of Transportation (Medical):      Lack of Transportation (Non-Medical):    Physical Activity:      Days of Exercise per Week:      Minutes of Exercise per Session:    Stress:      Feeling of Stress :    Social Connections:      Frequency of Communication with Friends and Family:      Frequency of Social Gatherings with Friends and Family:      Attends L[employer] Services:      Active Member of Clubs or Organizations:      Attends Club or Organization Meetings:      Marital Status:    Intimate Partner Violence:      Fear of Current or Ex-Partner:      Emotionally Abused:      Physically Abused:      Sexually Abused:            EXAMINATION:  Gen:   No apparent distress  Neuro:   A&Ox3, no deficits  Psych:    Answering questions appropriately for age and situation with normal affect  Head:    NCAT  Eye:    Visual scanning without deficit  Ear:    Response to auditory stimuli wnl  Lung:    Non-labored breathing on RA noted  Abd:    NTND per patient report  Lymph:    Bilateral lower extremity edema  Vasc:    Pulses palpable, CFT minimally delayed  Neuro:    Light touch sensation diminished distally  Derm:    Wounds with exposed fat noted bilateral 5th toe, dorsal right foot, and plantar R great toe, with associated localized erythema.  The erythema of the right foot, outlined around the rearfoot, has receded nicely.  No exposed bone, joint, tendon, and no necrosis, purulence or soft tissue crepitus noted.  MSK:    ROM, strength wnl without limitation, no pain on palpation noted.  Calf:    Neg for redness or tenderness      Imaging:  xrays R  foot - IMPRESSION: No fracture or dislocation. Arthritis in the mid foot and first MTP joint. No bone destruction to suggest osteomyelitis. Soft tissue swelling over the foot.    Labs:    Component      Latest Ref Rng & Units 4/29/2021 7/28/2021   WBC      4.0 - 11.0 10e3/uL  8.9   RBC Count      4.40 - 5.90 10e6/uL  3.92 (L)   Hemoglobin      13.3 - 17.7 g/dL  12.1 (L)   Hematocrit      40.0 - 53.0 %  38.1 (L)   MCV      78 - 100 fL  97   MCH      26.5 - 33.0 pg  30.9   MCHC      31.5 - 36.5 g/dL  31.8   RDW      10.0 - 15.0 %  15.4 (H)   Platelet Count      150 - 450 10e3/uL  264   % Neutrophils      %  54   % Lymphocytes      %  30   % Monocytes      %  10   % Eosinophils      %  4   % Basophils      %  1   % Immature Granulocytes      %  1   NRBCs per 100 WBC      <1 /100  0   Absolute Neutrophils      1.6 - 8.3 10e3/uL  4.9   Absolute Lymphocytes      0.8 - 5.3 10e3/uL  2.7   Absolute Monocytes      0.0 - 1.3 10e3/uL  0.9   Absolute Eosinophils      0.0 - 0.7 10e3/uL  0.3   Absolute Basophils      0.0 - 0.2 10e3/uL  0.1   Absolute Immature Granulocytes      <=0.0 10e3/uL  0.1 (H)   Absolute NRBCs      10e3/uL  0.0   Sodium      133 - 144 mmol/L  139   Potassium      3.4 - 5.3 mmol/L  4.4   Chloride      94 - 109 mmol/L  108   Carbon Dioxide      20 - 32 mmol/L  26   Anion Gap      3 - 14 mmol/L  5   Urea Nitrogen      7 - 30 mg/dL  24   Creatinine      0.66 - 1.25 mg/dL  1.29 (H)   Calcium      8.5 - 10.1 mg/dL  9.1   Glucose      70 - 99 mg/dL  132 (H)   GFR Estimate      >60 mL/min/1.73m2  53 (L)   Hemoglobin A1C      0 - 5.6 % 8.1 (H)    CRP Inflammation      0.0 - 8.0 mg/L  84.2 (H)       Cultures:    Culture Micro Abnormal   Moderate growth   Staphylococcus aureus   MICRO RAPID TESTING LAB   Susceptibility     Staphylococcus aureus     CASANDRA     Clindamycin <=0.25 ug/mL Susceptible     Erythromycin <=0.25 ug/mL Susceptible     Gentamicin <=0.5 ug/mL Susceptible     Oxacillin 0.5 ug/mL Susceptible      Penicillin  Resistant     Tetracycline <=1 ug/mL Susceptible     Trimethoprim/Sulfa <=0.5/9.5 u... Susceptible     Vancomycin 1 ug/mL Susceptible         Specimen [facility]   Special Requests Received in anaerobic tubes.  [facility]   Culture Micro Abnormal   Moderate growth   Finegoldia magna (Peptostreptococcus [place])   Susceptibility testing not routinely done           Assessment:  76 year old male with cellulitis associated with 4 diabetic foot ulcers with exposed fat in setting of DMII with neuropathy and previous left 3rd toe amputation      Plan:  Discussed etiologies, anatomy and options  1.  cellulitis associated with 4 diabetic foot ulcers with exposed fat in setting of DMII with neuropathy and previous left 3rd toe amputation  -I personally reviewed the patient's lower extremity history pertinent to today's visit, including imaging/labs, in preparation for initiating a treatment program.  -fibrous slough removed from bases of wounds with wooden stick cotton applicator, and wounds were probed.  No exposed deep fascia noted.  -Iodosorb dressings ordere bilateral lower extremity; WOC RN also consulted by Dr Leary.  -has bilateral surgical shoes  -no deep probing noted on today's exam, no indication for deep space infection.  Wound care, recommend 1 more day of IV abx, and likely ok to discharge tomorrow on 10 day course of PO Clindamycin.  -patient advised to follow up within 7-10 days of discharge for continued monitoring  -will sign off.  Please call with questions or acute changes to patient/wound status    Patient's medical history was reviewed today      Willy Whittington DPM FACFAS FACFAOM  Podiatric Foot & Ankle Surgeon  [facility]  [phone]    Disclaimer: This note consists of symbols derived from keyboarding, dictation and/or voice recognition software. As a result, there may be errors in the script that have gone undetected.  Please consider this when interpreting information found in this chart.

## 2021-07-28 NOTE — H&P
History and Physical     Lance Ibrahim MRN# [id number]   YOB: 1945 Age: 76 year old      Date of Admission:  7/28/2021    Primary care provider: Anh Spivey          Assessment and Plan:     Summary of Stay: Lance Ibrahim is a 76 year old male with a history of htn/T2dm/hlp, gout, BRIANNA not on CPAP, hx of TIA, CKD with baseline creat 1.2-1.5, hx of nonischemic cardiomyopathy with EF 40 % improved to 55 % on most recent echo 3/2020, HFpEF, BPH, Chronic ulcer with wound on right foot, prior hx of RLE cellulitis 2/2 infected 5th toe in 7/2020, hx of MSSA admitted on 7/28/2021 with RLE cellulitis 2/2 friction blisters from new orthotics.     He rec'd new bilateral orthotics about a week ago, immediatley after wearing them for a day he noticed a friction blister on the top aspect of his right foot and lat aspect of his left 5th digit.  He didn't wear them again and instead put on his post op shoes from a few years ago which are more forgiving, and kept a close eye on them.  Then on the am of 7/27/21 he noticed evolving erythema of his right foot, it was also swelling and warm.  Over the course of the day the erythema/warmth/swelling, and in particular the pain continued to progress.  The pain became so bad it was interrupting his ability to sleep, and even a sheet brushing up against it was excruciating.  Over the same time period he was also noticing that his left 5th toe was also becoming red and swollen but not nearly as painful as his right foot.  He denies any fevers, no c/s, no n/v, no malaise.  No cp or sob.     In the ER VSS x mild hypertension.  BMP stable, CRP 84.2, glucose 132, CBC wnl x mildly low normocytic anemia at 12.1.  XRay of the foot negative.  He's been started on IV clindamycin due to allergies to both cephalosporins and penicillin (anaphylaxis/facial swelling).     He is admitted to a med bed for rapidly progressive cellulitis      Problem List:   RLE cellulitis/L 5th toe  cellulitis  Likely brought about by trauma from ill fitting orthotics  -IV clinda  -keep leg elevated, charles bid    T2dm await med rec but looks to just be on metformin.  At risk of decompensation  -monitor with ISS    htn  Would resume home regimen when med rec complete    Chronic right foot ulcer (under great toe)  -I don't think this has anything to do with his current infection, looks ok to me  -ask for podiatry to stop by to ensure he gets appropriate care    CKD  Baseline creat 1.2-1.5, stable    Prior hx of stroke  Resume asa/statin when med rec complete, aim for good htn and BG control     BRIANNA not on CPAP    Hx of systolic HF now resolved, chronic HFpEF  -resume baseline treatment when med rec complete    BPH/Gout  -Resume pta meds when med rec complete    DVT Prophylaxis: Heparin SQ  Code Status: Full Code  Functional Status:  Independent, is primary caregiver for his wife who is bedbound  Barnes: not needed  Access:  PIV              Chief Complaint:     RLE erythema and pain        History of Present Illness:   Lance Ibrahim is a 76 year old male with a history of htn/T2dm/hlp, gout, BRIANNA not on CPAP, hx of TIA, CKD with baseline creat 1.2-1.5, hx of nonischemic cardiomyopathy with EF 40 % improved to 55 % on most recent echo 3/2020, HFpEF, BPH, Chronic ulcer with wound on right foot, prior hx of RLE cellulitis 2/2 infected 5th toe in 7/2020, hx of MSSA admitted on 7/28/2021 with RLE cellulitis 2/2 friction blisters from new orthotics.     He rec'd new bilateral orthotics about a week ago, immediatley after wearing them for a day he noticed a friction blister on the top aspect of his right foot and lat aspect of his left 5th digit.  He didn't wear them again and instead put on his post op shoes from a few years ago which are more forgiving, and kept a close eye on them.  Then on the am of 7/27/21 he noticed evolving erythema of his right foot, it was also swelling and warm.  Over the course of the day the  erythema/warmth/swelling, and in particular the pain continued to progress.  The pain became so bad it was interrupting his ability to sleep, and even a sheet brushing up against it was excruciating.  Over the same time period he was also noticing that his left 5th toe was also becoming red and swollen but not nearly as painful as his right foot.  He denies any fevers, no c/s, no n/v, no malaise.  No cp or sob.     In the ER VSS x mild hypertension.  BMP stable, CRP 84.2, glucose 132, CBC wnl x mildly low normocytic anemia at 12.1.  XRay of the foot negative.  He's been started on IV clindamycin due to allergies to both cephalosporins and penicillin (anaphylaxis/facial swelling).     He is admitted to a [facility] bed for rapidly progressive cellulitis      The history is obtained in discussion with the ER provider Dr Vazquez and the patient with excellent reliability            Past Medical History:     Past Medical History:   Diagnosis Date     Arthritis     osteoarthritis knees     BPH      CAD (coronary artery disease)     subtotal occlusion in the small distal LAD      Cardiomyopathy      Cerebral artery occlusion with cerebral infarction     TIA 1993, no residual     Chronic kidney disease      Chronic rhinitis      HTN (hypertension)      Hyperlipidemia      Kidney stone      PVD (peripheral vascular disease)      Sleep apnea     doesn't use cpap     TIA (transient ischaemic attack) 1993     Type 2 diabetes mellitus - 11/08/2018     Ureteral stone              Past Surgical History:     Past Surgical History:   Procedure Laterality Date     AMPUTATE TOE(S) Left 10/15/2018    Procedure: AMPUTATE TOE(S);  Left third toe amputation;  Surgeon: Ayaka Azevedo DPM, Podiatry/Foot and Ankle Surgery;  Location: [facility] OR     ARTHROPLASTY KNEE Right 12/07/2018    Procedure: Right total knee arthroplasty;  Surgeon: Issa Cunha MD;  Location:  OR     COLONOSCOPY  03/09/2013    Procedure: COLONOSCOPY;  COLONOSCOPY;   "Surgeon: Chau Hogan MD;  Location:  GI     CV HEART CATHETERIZATION WITH POSSIBLE INTERVENTION Left 2019    Procedure: Coronary Angiogram;  Surgeon: Nas Linda MD;  Location:  HEART CARDIAC CATH LAB     Diastasis recti repair       EXTRACAPSULAR CATARACT EXTRATION WITH INTRAOCULAR LENS IMPLANT Left 2017     EXTRACAPSULAR CATARACT EXTRATION WITH INTRAOCULAR LENS IMPLANT Right      FOOT SURGERY  2013    cyst removal      HERNIA REPAIR  2004    ventral      ORIF Shoulder Left      Ventral hernia repair NOS  1987             Social History:     Social History     Tobacco Use     Smoking status: Former Smoker     Packs/day: 0.00     Quit date: 3/17/1993     Years since quittin.3     Smokeless tobacco: Never Used   Substance Use Topics     Alcohol use: Not Currently   Code Status: Full Code  Functional Status:  Independent, is primary caregiver for his wife who is bedbound          Family History:     Family History   Problem Relation Age of Onset     Family History Negative Mother          88 yo     Cancer Father          76 yo brain     Diabetes Maternal Grandfather          89 yo     Alcohol/Drug Paternal Grandfather              Colon Cancer No family hx of             Allergies:     Allergies   Allergen Reactions     Penicillins Anaphylaxis     \"anaphylactic\"     Sulfa Drugs      \"itchy rash, swelling of face and hands\"     Ancef [Cefazolin] Rash             Medications:   Per my conversation with patient:    Amlodipine  tamsulosin  Isosorbide mono  Metformin  Finasteride  labetalol   Lisinopril    Awaiting formal med rec              Review of Systems:     A Comprehensive greater than 10 system review of systems was carried out.  Pertinent positives and negatives are noted above.  Otherwise negative for contributory information.           Physical Exam:   Blood pressure (!) 151/86, pulse 92, temperature 98.8  F (37.1  C), temperature " source Temporal, resp. rate 19, SpO2 100 %.  Exam:    General:  Pleasant nad looks stated age  HEENT:  Head nc/at sclera clear PERRL O/P:  Mask in place Neck is supple  Lungs: cta b nl effort   CV:  RRR no m/r/g no CV related edema  Abd:  S/nt/nd no r/g  Neuro:  Cn 2-12 grossly intact and pedraza  Alert and oriented affect appropriate   Skin:  W/d no c/c  RLE with blister about ` inch below the right great toe, circumferential erythema of the foot extending up the anterior shin.   3 + edema, hot to touch, exquisitely tender even to light touch,                Data:          Lab Results   Component Value Date     07/28/2021     04/29/2021    Lab Results   Component Value Date    CHLORIDE 108 07/28/2021    CHLORIDE 107 04/29/2021    Lab Results   Component Value Date    BUN 24 07/28/2021    BUN 27 04/29/2021      Lab Results   Component Value Date    POTASSIUM 4.4 07/28/2021    POTASSIUM 4.7 04/29/2021    Lab Results   Component Value Date    CO2 26 07/28/2021    CO2 27 04/29/2021    Lab Results   Component Value Date    CR 1.29 07/28/2021    CR 1.19 04/29/2021        Lab Results   Component Value Date    WBC 8.9 07/28/2021    HGB 12.1 (L) 07/28/2021    HCT 38.1 (L) 07/28/2021    MCV 97 07/28/2021     07/28/2021     Lab Results   Component Value Date     (H) 07/28/2021     CRP 84       Imaging:     Recent Results (from the past 24 hour(s))   Foot  XR, G/E 3 views, right    Narrative    EXAM: XR FOOT RIGHT G/E 3 VIEWS  LOCATION: [facility]  DATE/TIME: 7/28/2021 3:54 AM    INDICATION: Right foot wound.  COMPARISON: None.      Impression    IMPRESSION: No fracture or dislocation. Arthritis in the mid foot and first MTP joint. No bone destruction to suggest osteomyelitis. Soft tissue swelling over the foot.

## 2021-07-28 NOTE — CONSULTS
WO Nurse Inpatient Wound Assessment   Reason for consultation: Evaluate and treat  Bilateral foot wounds    Assessment  Bilateral foot wounds due to Friction and Diabetic Ulcer  Status: initial assessment  Right dorsal foot with erythema, wound with moderate drainage.  Left 5th toe covered with fibrin.  Right 5th toe covered with dried drainage.  Right great toe with agranular tissue, dried drainage and callus.   Treatment Plan  Bilateral 5th toes, right dorsal foot and right great toe wounds: Daily    1. Cleanse with wound cleanser and dry  2. Apply Iodosorb gel to wounds  3. Cover with adaptic (cut to fit) and dry gauze    Orders Written  Recommended provider order: None, at this time  WOC Nurse follow-up plan:weekly  Nursing to notify the Provider(s) and re-consult the WOC Nurse if wound(s) deteriorates or new skin concern.    Patient History  According to provider note(s):  Summary of Stay: Lance Ibrahim is a 76 year old male with a history of htn/T2dm/hlp, gout, BRIANNA not on CPAP, hx of TIA, CKD with baseline creat 1.2-1.5, hx of nonischemic cardiomyopathy with EF 40 % improved to 55 % on most recent echo 3/2020, HFpEF, BPH, Chronic ulcer with wound on right foot, prior hx of RLE cellulitis 2/2 infected 5th toe in 7/2020, hx of MSSA admitted on 7/28/2021 with RLE cellulitis 2/2 friction blisters from new orthotics.        Objective Data    Active Diet Order  Orders Placed This Encounter      Combination Diet Regular Diet Adult      Output:   No intake/output data recorded.    Risk Assessment:   Sensory Perception: 3-->slightly limited  Moisture: 4-->rarely moist  Activity: 3-->walks occasionally  Mobility: 3-->slightly limited  Nutrition: 3-->adequate  Friction and Shear: 3-->no apparent problem  Noé Score: 19                          Labs:   Recent Labs   Lab 07/28/21  0337   HGB 12.1*   WBC 8.9   CRP 84.2*       Physical Exam  Areas of skin assessed: focused bilateral feet    Wound Location:  Right dorsal  foot  Date of last photo 7/27/21    Wound History: Patient reports this wound and 5th toe wounds all started from new shoes rubbing.  Wound Base: 50 % fibrin, 50% dried drainage     Palpation of the wound bed: normal      Drainage: moderate     Description of drainage: serous     Measurements (length x width x depth, in cm) 0.5  x 1  x  0.3 cm      Tunneling N/A     Undermining N/A  Periwound skin: erythema- blanchable      Color: pink      Temperature: normal   Odor: none  Pain: mild     Wound Location:  Left 5th toe  Date of last photo 7/28/21    Wound History: see above  Wound Base: 100 % fibrin     Palpation of the wound bed: normal      Drainage: scant     Description of drainage: serosanguinous     Measurements (length x width x depth, in cm) 0.7  x 0.7  x  0.1 cm      Tunneling N/A     Undermining N/A  Periwound skin: erythema- blanchable      Color: pink      Temperature: normal   Odor: none    Wound Location:  Right 5th toe  Date of last photo 7/28/21    Wound History: see above  Wound Base: 100 % dry drainage     Palpation of the wound bed: normal      Drainage: none     Description of drainage: none     Measurements (length x width x depth, in cm) 1  x 0.7 cm      Tunneling N/A     Undermining N/A  Periwound skin: intact      Color: normal and consistent with surrounding tissue      Temperature: normal   Odor: none    Wound Location:  Right great toe  Date of last photo 7/28/21    Wound History: Chronic diabetic foot ulcer  Wound Base: Agranular, dried drainage and callus mix     Palpation of the wound bed: firm      Drainage: none     Description of drainage: none     Measurements (length x width x depth, in cm) 1.2  x 1.2  x  0.4 cm      Tunneling N/A     Undermining N/A  Periwound skin: dry/scaly      Color: normal and consistent with surrounding tissue      Temperature: normal   Odor: none    Interventions  Visual inspection and assessment completed   Wound Care Rationale Promote moist wound healing  without tissue dehydration , Provide selective debridement (autolysis) of nonviable tissue  and Decrease bacterial load  Wound Care: completed by RN  Supplies: ordered: Iodosorb  Current off-loading measures: Pillows under calves and Pillows  Current support surface: Standard  Atmos Air mattress  Education provided to: plan of care  Discussed plan of care with Patient and Nurse    Laurent Johnson RN CWOCN

## 2021-07-28 NOTE — ED PROVIDER NOTES
History   Chief Complaint:  Foot Pain       The history is provided by the patient.      Lance Ibrahim is a 76 year old male with history of hypertension, CAD, hyperlipidemia, MSSA, and diabetes type 2 who presents with foot pain. Patient states he got new shoes and one week ago he developed a blister of his right foot. He noticed warmth and redness of the area and developed pain yesterday. He also notes a small blister on his left foot. He denies fever or chills.  Last tetanus was 2013.     Review of Systems   Constitutional: Negative for chills and fever.   Musculoskeletal: Positive for arthralgias (foot pain).   Skin: Positive for color change and wound.   All other systems reviewed and are negative.    Allergies:  Penicillins  Sulfa drugs  Ancef    Medications:  Norvasc  Aspirin 81 mg  Lipitor  Mitigare  Proscar  Flonase  Imdur  Zestril  Claritin  Glucophage  Flomax  Demadex    Past Medical History:    Arthritis  BPH  CAD  Cardiomyopathy  Cerebral artery occlusion  CKD  Rhinitis  Hypertension  Hyperlipidemia  Kidney stone  PVD  TIA  Type 2 diabetes mellitus  Ureteral stone  Hypertrophy of prostate  Ventral hernia  Gout   MSSA    Past Surgical History:    Amputate left third toe  Arthroplasty knee  Colonoscopy  Heart catheterization  Diastasis recti repair  Extracapsular cataract extraction with intraocular lens implant x2  Cyst removal  Ventral hernia repair  ORIF shoulder     Family History:    Father: cancer    Social History:  Presents to ED alone      Physical Exam     Patient Vitals for the past 24 hrs:   BP Temp Temp src Pulse Resp SpO2   07/28/21 0512 (!) 151/86 -- -- 92 19 100 %   07/28/21 0009 (!) 147/81 98.8  F (37.1  C) Temporal 98 18 99 %       Physical Exam  Nursing note and vitals reviewed.  Constitutional: Well nourished.   Eyes: Conjunctiva normal.  Pupils are equal, round, and reactive to light.   ENT: Nose normal. Mucous membranes pink and moist.    Neck: Normal range of motion.  CVS:  Normal rate, regular rhythm.  Normal heart sounds.  2/2 DP pulses  Pulmonary: Lungs clear to auscultation bilaterally. No wheezes/rales/rhonchi.  GI: Abdomen soft. Nontender, nondistended. No rigidity or guarding.    MSK: No calf tenderness or swelling. R. Dorsal foot with erythema/warmth, induration. Open sore noted with scant purulent drainage.  Full active ROM R. ankle  Neuro: Alert. Follows simple commands. Sensation intact x 4.    Skin: Skin is warm and dry. No rash noted. Pressure sores to bilateral pinky toes, no surrounding warmth/erythema  Psychiatric: Normal affect.       Emergency Department Course   Imaging:  Foot XR, G/E 3 views, right  No fracture or dislocation. Arthritis in the mid foot and first MTP joint. No bone destruction to suggest osteomyelitis. Soft tissue swelling over the foot.  As per radiology.     Laboratory:  Erythrocyte sedimentation rate auto: Pending  Skin Aerobic Bacterial Culture Routine: Pending  Blood culture peripheral x2: Pending  CRP inflammation: 84.2(H)  BMP: Creatinine: 1.29(H), Glucose: 132(H), GFR: 53(L), o/w WNL   CBC: WBC 8.9, HGB 12.1(L),    SARS-COV2 (COVID-19) Virus RT-PCR: pending    Emergency Department Course:    Reviewed:  I reviewed nursing notes, vitals, past medical history and care everywhere    Assessments:  0303 I obtained history and examined the patient as noted above.   0550 I rechecked the patient and explained findings.     Consults:   0448 I spoke with Dr. Leary from the Hospitalist service regarding patient's presentation, findings, and plan of care.     Interventions:  0344 Sublimaze, 50 mcg, IV  0435 Cleocin, 600 mg, IV    Disposition:  The patient was admitted to the hospital under the care of Dr. Leary.       Impression & Plan     Medical Decision Making:  Patient is a 76-year-old male presenting with reported foot pain.  He is neurovascularly intact.  X-ray without focal fracture, dislocation or soft tissue gas.  No evidence to suggest  septic joint on exam.  I do have concerns for cellulitis based on exam.  He has a history of MSSA.  Wound culture sent today.  No evidence to suggest severe sepsis.  The patient remained hemodynamically stable.  Given his allergies to multiple antibiotics, IV clindamycin ordered.  He was agreeable to admission given the extent of his cellulitis.    Covid-19  Lance Ibrahim was evaluated during a global COVID-19 pandemic, which necessitated consideration that the patient might be at risk for infection with the SARS-CoV-2 virus that causes COVID-19.   Applicable protocols for evaluation were followed during the patient's care.     Diagnosis:    ICD-10-CM    1. Cellulitis of foot  L03.119        Scribe Disclosure:  Pato PHILLIPS, am serving as a scribe at 3:03 AM on 7/28/2021 to document services personally performed by Olive Beltre DO based on my observations and the provider's statements to me.            Olive Beltre DO  07/28/21 0554

## 2021-07-29 VITALS
BODY MASS INDEX: 34.08 KG/M2 | HEART RATE: 94 BPM | HEIGHT: 72 IN | RESPIRATION RATE: 12 BRPM | OXYGEN SATURATION: 97 % | WEIGHT: 251.6 LBS | TEMPERATURE: 97.8 F | SYSTOLIC BLOOD PRESSURE: 144 MMHG | DIASTOLIC BLOOD PRESSURE: 74 MMHG

## 2021-07-29 LAB
ANION GAP SERPL CALCULATED.3IONS-SCNC: 6 MMOL/L (ref 3–14)
BASOPHILS # BLD AUTO: 0.1 10E3/UL (ref 0–0.2)
BASOPHILS NFR BLD AUTO: 1 %
BUN SERPL-MCNC: 24 MG/DL (ref 7–30)
CALCIUM SERPL-MCNC: 9.3 MG/DL (ref 8.5–10.1)
CHLORIDE BLD-SCNC: 107 MMOL/L (ref 94–109)
CO2 SERPL-SCNC: 24 MMOL/L (ref 20–32)
CREAT SERPL-MCNC: 1.3 MG/DL (ref 0.66–1.25)
EOSINOPHIL # BLD AUTO: 0.3 10E3/UL (ref 0–0.7)
EOSINOPHIL NFR BLD AUTO: 4 %
ERYTHROCYTE [DISTWIDTH] IN BLOOD BY AUTOMATED COUNT: 15 % (ref 10–15)
GFR SERPL CREATININE-BSD FRML MDRD: 53 ML/MIN/1.73M2
GLUCOSE BLD-MCNC: 161 MG/DL (ref 70–99)
GLUCOSE BLDC GLUCOMTR-MCNC: 149 MG/DL (ref 70–99)
GLUCOSE BLDC GLUCOMTR-MCNC: 193 MG/DL (ref 70–99)
HCT VFR BLD AUTO: 36.2 % (ref 40–53)
HGB BLD-MCNC: 11.4 G/DL (ref 13.3–17.7)
IMM GRANULOCYTES # BLD: 0.1 10E3/UL
IMM GRANULOCYTES NFR BLD: 1 %
LYMPHOCYTES # BLD AUTO: 1.7 10E3/UL (ref 0.8–5.3)
LYMPHOCYTES NFR BLD AUTO: 25 %
MCH RBC QN AUTO: 30.5 PG (ref 26.5–33)
MCHC RBC AUTO-ENTMCNC: 31.5 G/DL (ref 31.5–36.5)
MCV RBC AUTO: 97 FL (ref 78–100)
MONOCYTES # BLD AUTO: 0.7 10E3/UL (ref 0–1.3)
MONOCYTES NFR BLD AUTO: 10 %
NEUTROPHILS # BLD AUTO: 4 10E3/UL (ref 1.6–8.3)
NEUTROPHILS NFR BLD AUTO: 59 %
NRBC # BLD AUTO: 0 10E3/UL
NRBC BLD AUTO-RTO: 0 /100
PLATELET # BLD AUTO: 258 10E3/UL (ref 150–450)
POTASSIUM BLD-SCNC: 4.5 MMOL/L (ref 3.4–5.3)
RBC # BLD AUTO: 3.74 10E6/UL (ref 4.4–5.9)
SODIUM SERPL-SCNC: 137 MMOL/L (ref 133–144)
WBC # BLD AUTO: 6.6 10E3/UL (ref 4–11)

## 2021-07-29 PROCEDURE — 80048 BASIC METABOLIC PNL TOTAL CA: CPT | Performed by: INTERNAL MEDICINE

## 2021-07-29 PROCEDURE — 85025 COMPLETE CBC W/AUTO DIFF WBC: CPT | Performed by: INTERNAL MEDICINE

## 2021-07-29 PROCEDURE — 99239 HOSP IP/OBS DSCHRG MGMT >30: CPT | Performed by: INTERNAL MEDICINE

## 2021-07-29 PROCEDURE — 36415 COLL VENOUS BLD VENIPUNCTURE: CPT | Performed by: INTERNAL MEDICINE

## 2021-07-29 PROCEDURE — 250N000013 HC RX MED GY IP 250 OP 250 PS 637: Performed by: INTERNAL MEDICINE

## 2021-07-29 PROCEDURE — 250N000011 HC RX IP 250 OP 636: Performed by: INTERNAL MEDICINE

## 2021-07-29 RX ORDER — CLINDAMYCIN HCL 300 MG
300 CAPSULE ORAL 4 TIMES DAILY
Qty: 40 CAPSULE | Refills: 0 | Status: SHIPPED | OUTPATIENT
Start: 2021-07-29 | End: 2021-08-08

## 2021-07-29 RX ADMIN — AMLODIPINE BESYLATE 5 MG: 5 TABLET ORAL at 07:55

## 2021-07-29 RX ADMIN — TORSEMIDE 20 MG: 20 TABLET ORAL at 07:55

## 2021-07-29 RX ADMIN — FERROUS GLUCONATE 324 MG: 324 TABLET ORAL at 07:55

## 2021-07-29 RX ADMIN — LISINOPRIL 10 MG: 10 TABLET ORAL at 07:54

## 2021-07-29 RX ADMIN — TAMSULOSIN HYDROCHLORIDE 0.4 MG: 0.4 CAPSULE ORAL at 07:55

## 2021-07-29 RX ADMIN — ASPIRIN 81 MG: 81 TABLET ORAL at 07:55

## 2021-07-29 RX ADMIN — CLINDAMYCIN PHOSPHATE 900 MG: 900 INJECTION, SOLUTION INTRAVENOUS at 04:09

## 2021-07-29 RX ADMIN — INSULIN ASPART 1 UNITS: 100 INJECTION, SOLUTION INTRAVENOUS; SUBCUTANEOUS at 07:58

## 2021-07-29 RX ADMIN — ISOSORBIDE MONONITRATE 30 MG: 30 TABLET, EXTENDED RELEASE ORAL at 07:54

## 2021-07-29 RX ADMIN — CLINDAMYCIN PHOSPHATE 900 MG: 900 INJECTION, SOLUTION INTRAVENOUS at 11:19

## 2021-07-29 RX ADMIN — FINASTERIDE 5 MG: 5 TABLET, FILM COATED ORAL at 07:55

## 2021-07-29 RX ADMIN — ATORVASTATIN CALCIUM 40 MG: 40 TABLET, FILM COATED ORAL at 07:55

## 2021-07-29 RX ADMIN — COLCHICINE 0.6 MG: 0.6 TABLET, FILM COATED ORAL at 07:55

## 2021-07-29 ASSESSMENT — ACTIVITIES OF DAILY LIVING (ADL)
ADLS_ACUITY_SCORE: 13

## 2021-07-29 NOTE — PROGRESS NOTES
We will continue lithium this morning.  He was seen and examined by me.  Doing well with the surgery team for lower extremity much better vascular medicine is following.  Podiatrist note reviewed and recommendation is to discharge with oral antibiotic.  She can gait more IV clindamycin, discharged with 10 days clindamycin orally sent to [place] specialty pharmacy.  Advised and was given discharge instructions.

## 2021-07-29 NOTE — PLAN OF CARE
A&Ox4. Baseline CMS intact, numbness/tingling in feet per pt report. Redness and swelling bilateral feet/ankles. Swollen areas marked by podiatry, and dressings on toes are CDI. Independent in room with cane. IV Cleocin. BS check 209 HS, sliding scale insulin. On metformin at home. Potential discharge home tomorrow.     /62   Pulse 94   Temp 98.3  F (36.8  C) (Temporal)   Resp 18   Ht 1.829 m (6')   Wt 114.1 kg (251 lb 9.6 oz)   SpO2 94%   BMI 34.12 kg/m

## 2021-07-29 NOTE — PROGRESS NOTES
Discharge instructions reviewed with pt. Verbalizes understanding. Prescription for cleocin given to pt.  Wound care supplies and personal belongings sent with pt. Pt escorted via w/c accompanied by staff to ED entrance. Pt leaves for home via self per personal vehicle.

## 2021-07-30 ENCOUNTER — PATIENT OUTREACH (OUTPATIENT)
Dept: CARE COORDINATION | Facility: CLINIC | Age: 76
End: 2021-07-30

## 2021-07-30 DIAGNOSIS — Z71.89 OTHER SPECIFIED COUNSELING: ICD-10-CM

## 2021-07-30 DIAGNOSIS — I10 ESSENTIAL HYPERTENSION: ICD-10-CM

## 2021-07-30 NOTE — TELEPHONE ENCOUNTER
Routing refill request to provider for review/approval because:  Labs out of range:    BP Readings from Last 3 Encounters:   07/29/21 (!) 144/74   04/29/21 135/73   12/31/20 (!) 160/86     Creatinine   Date Value Ref Range Status   07/29/2021 1.30 (H) 0.66 - 1.25 mg/dL Final   04/29/2021 1.19 0.66 - 1.25 mg/dL Final     Yris Miller RN, BSN

## 2021-07-30 NOTE — PROGRESS NOTES
"Clinic Care Coordination Contact  [facility]: Post-Discharge Note  SITUATION                                                      Admission:    Admission Date: 07/28/21   Reason for Admission: Cellulitis of foot  Discharge:   Discharge Date: 07/29/21  Discharge Diagnosis: Cellulitis of foot    BACKGROUND                                                      Lance Ibrahim is a 76 year old male with a history of htn/T2dm/hlp, gout, BRIANNA not on CPAP, history of TIA, CKD with baseline creat 1.2-1.5, history of nonischemic cardiomyopathy with EF 40 % improved to 55 % on most recent echo 3/2020, HFpEF, BPH, Chronic ulcer with wound on right foot, prior history of RLE cellulitis 2/2 infected 5th toe in 7/2020, history of MSSA admitted on 7/28/2021 with RLE cellulitis 2/2 friction blisters from new orthotics.    ASSESSMENT      Discharge Assessment  How are you doing now that you are home?: Patient made a joke referencing Micheal Davenport: \"I'm old, overweight, diabetic, (etc...) but other than that how was the play?\". Patient stated he feel like he is returning to normal but is concerned with a 2-year old wound that has become active again.  How are your symptoms? (Red Flag symptoms escalate to triage hotline per guidelines): Unchanged;Improved  Do you feel your condition is stable enough to be safe at home until your provider visit?: Yes  Does the patient have their discharge instructions? : Yes  Does the patient have questions regarding their discharge instructions? : No  Were you started on any new medications or were there changes to any of your previous medications? : Yes - Schedule RN[facility] appt within 48 business hours  Does the patient have all of their medications?: Yes  Do you have questions regarding any of your medications? : No  Do you have all of your needed medical supplies or equipment (DME)?  (i.e. oxygen tank, CPAP, cane, etc.): Yes  Discharge follow-up appointment scheduled within 14 calendar days? : " No  Is patient agreeable to assistance with scheduling? : No (Patient is going to wait until Monday to decide if he should see his podiatrist or PCP based on if his re-opened wound starts to heal or becomes worse.)    Post-op  Did the patient have surgery or a procedure: No  Fever: No  Chills: No  Eating & Drinking: eating and drinking without complaints/concerns  PO Intake: regular diet  Bowel Function: loose stools  Date of last BM: 07/30/21  Urinary Status: voiding without complaint/concerns      PLAN                                                      Outpatient Plan:      Follow up with primary care provider, Anh Spivey, within 7 days to evaluate medication change, to evaluate treatment change, and for hospital follow- up. No follow up labs or test are needed.    Future Appointments   Date Time Provider Department Center   9/29/2021  8:45 AM RU LAB [facility]   9/29/2021  9:45 AM [facility]         For any urgent concerns, please contact our 24 hour nurse triage line: [phone] ([phone])         Estee Hinton  Community Health Worker  [facility]  Ph: [phone]

## 2021-07-30 NOTE — PROGRESS NOTES
Clinic Care Coordination Contact  [facility]/Voicemail       Clinical Data: Care Coordinator Outreach  Outreach attempted x 1.  Left message on patient's voicemail with call back information and requested return call.  Plan: Care Coordinator will try to reach patient again in 1-2 business days.    Estee Hinton  Community Health Worker  [facility]  Ph: [phone]

## 2021-07-31 LAB
BACTERIA SKIN AEROBE CULT: ABNORMAL

## 2021-08-01 RX ORDER — LISINOPRIL 10 MG/1
TABLET ORAL
Qty: 90 TABLET | Refills: 1 | Status: SHIPPED | OUTPATIENT
Start: 2021-08-01 | End: 2022-01-26

## 2021-08-02 LAB
BACTERIA BLD CULT: NO GROWTH
BACTERIA BLD CULT: NO GROWTH

## 2021-08-05 NOTE — DISCHARGE SUMMARY
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      Secondary Diagnosis /chronic medical conditions         Past Medical History:   Diagnosis Date     Arthritis     osteoarthritis knees     BPH      CAD (coronary artery disease)     subtotal occlusion in the small distal LAD      Cardiomyopathy      Cerebral artery occlusion with cerebral infarction     TIA 1993, no residual     Chronic kidney disease      Chronic rhinitis      HTN (hypertension)      Hyperlipidemia      Kidney stone      PVD (peripheral vascular disease)      Sleep apnea     doesn't use cpap     TIA (transient ischaemic attack) 1993     Type 2 diabetes mellitus - 11/08/2018     Ureteral stone          Past Surgical History:      Past Surgical History:   Procedure Laterality Date     AMPUTATE TOE(S) Left 10/15/2018    Procedure: AMPUTATE TOE(S);  Left third toe amputation;  Surgeon: Ayaka Azevedo DPM, Podiatry/Foot and Ankle Surgery;  Location:  OR     ARTHROPLASTY KNEE Right 12/07/2018    Procedure: Right total knee arthroplasty;  Surgeon: Issa Cunha MD;  Location:  OR     COLONOSCOPY  03/09/2013    Procedure: COLONOSCOPY;  COLONOSCOPY;  Surgeon: Chau Hogan MD;  Location:  GI     CV HEART CATHETERIZATION WITH POSSIBLE INTERVENTION Left 03/05/2019    Procedure: Coronary Angiogram;  Surgeon: Nas Linda MD;  Location:  HEART CARDIAC CATH LAB     Diastasis recti  repair  1985     EXTRACAPSULAR CATARACT EXTRATION WITH INTRAOCULAR LENS IMPLANT Left 03/13/2017     EXTRACAPSULAR CATARACT EXTRATION WITH INTRAOCULAR LENS IMPLANT Right      FOOT SURGERY  04/2013    cyst removal      HERNIA REPAIR  07/13/2004    ventral      ORIF Shoulder Left      Ventral hernia repair NOS  1987               Brief Summary of Hospital stay :       Please refer to  Admission H&P note for full details of patient presentation.    Reason for Hospitalization(C/C,HPI and brief patient summary): Foot cellulitis        Significant findings(Primary diagnosis )Procedures and treatments provided(Hospital course ,consults, procedures):Please see bellow for details    Lance Ibrahim is a 76 year old male with a history of htn/T2dm/hlp, gout, BRIANNA not on CPAP, hx of TIA, CKD with baseline creat 1.2-1.5, hx of nonischemic cardiomyopathy with EF 40 % improved to 55 % on most recent echo 3/2020, HFpEF, BPH, Chronic ulcer with wound on right foot, prior hx of RLE cellulitis 2/2 infected 5th toe in 7/2020, hx of MSSA admitted on 7/28/2021 with RLE cellulitis 2/2 friction blisters from new orthotics.      He rec'd new bilateral orthotics about a week ago, immediatley after wearing them for a day he noticed a friction blister on the top aspect of his right foot and lat aspect of his left 5th digit.  He didn't wear them again and instead put on his post op shoes from a few years ago which are more forgiving, and kept a close eye on them.  Then on the am of 7/27/21 he noticed evolving erythema of his right foot, it was also swelling and warm.  Over the course of the day the erythema/warmth/swelling, and in particular the pain continued to progress.  The pain became so bad it was interrupting his ability to sleep, and even a sheet brushing up against it was excruciating.  Over the same time period he was also noticing that his left 5th toe was also becoming red and swollen but not nearly as painful as his right foot.  He  denies any fevers, no c/s, no n/v, no malaise.  No cp or sob.      In the ER VSS x mild hypertension.  BMP stable, CRP 84.2, glucose 132, CBC wnl x mildly low normocytic anemia at 12.1.  XRay of the foot negative.  He was started on IV clindamycin due to allergies to both cephalosporins and penicillin (anaphylaxis/facial swelling).      He  Was admitted to a [facility] bed for rapidly progressive cellulitis.    Patient was admitted and was treated with intravenous antibiotic with clindamycin.  Podiatry service consulted and patient was seen by Dr. Spear who examined patient and recommended antibiotic therapy and wound care.  Patient was discharged in stable condition and instructed to follow-up with his primary care physician and podiatry as needed.  He was afebrile, hemodynamically stable and without sepsis on the day of discharge.         Consultations during hospital stay:    WOUND OSTOMY CONTINENCE NURSE  IP CONSULT  PODIATRY IP CONSULT      Patient discharge Condition:     stable    BP (!) 144/74 (BP Location: Right arm)   Pulse 94   Temp 97.8  F (36.6  C) (Temporal)   Resp 12   Ht 1.829 m (6')   Wt 114.1 kg (251 lb 9.6 oz)   SpO2 97%   BMI 34.12 kg/m           Discharge Instructions:       Patient/family instructions: Written discharge instruction given to patient/family    Discharge Medications:       Review of your medicines      START taking      Dose / Directions   Cadexomer Iodine (topical) 0.9% 0.9 % Gel gel  Commonly known as: IODOSORB  Used for: Cellulitis of foot      Apply to bilateral foot wounds as instructed daily  Quantity: 10 g  Refills: 0     clindamycin 300 MG capsule  Commonly known as: CLEOCIN  Used for: Cellulitis of foot      Dose: 300 mg  Take 1 capsule (300 mg) by mouth 4 times daily for 10 days  Quantity: 40 capsule  Refills: 0        CONTINUE these medicines which have NOT CHANGED      Dose / Directions   amLODIPine 5 MG tablet  Commonly known as: NORVASC  Used for: Essential  hypertension      TAKE 1 TABLET BY MOUTH EVERY DAY  Quantity: 90 tablet  Refills: 3     aspirin 81 MG EC tablet  Commonly known as: ASA  Used for: Essential hypertension      Dose: 81 mg  Take 1 tablet (81 mg) by mouth daily  Quantity: 100 tablet  Refills: 3     atorvastatin 40 MG tablet  Commonly known as: LIPITOR  Used for: Essential hypertension      Dose: 40 mg  Take 1 tablet (40 mg) by mouth daily  Quantity: 90 tablet  Refills: 0     colchicine 0.6 MG capsule  Commonly known as: MITIGARE  Used for: Idiopathic gout, unspecified chronicity, unspecified site      Dose: 0.6 mg  TAKE 1 CAPSULE (0.6 MG) BY MOUTH 2 TIMES DAILY  Quantity: 180 capsule  Refills: 1     diclofenac 1 % topical gel  Commonly known as: VOLTAREN  Indication: Joint Damage causing Pain and Loss of Function      Place onto the skin daily as needed  Refills: 0     Ferrous Gluconate 240 (27 Fe) MG Tabs      Dose: 1 tablet  Take 1 tablet by mouth daily  Refills: 0     finasteride 5 MG tablet  Commonly known as: PROSCAR      Dose: 5 mg  Take 5 mg by mouth daily.  Refills: 0     fluticasone 50 MCG/ACT nasal spray  Commonly known as: FLONASE      Dose: 1 spray  Spray 1 spray into both nostrils nightly as needed  Refills: 0     isosorbide mononitrate 30 MG 24 hr tablet  Commonly known as: IMDUR  Used for: Dyspnea on exertion, Chest pain, unspecified type, Cardiomyopathy, unspecified type (H)      Dose: 30 mg  Take 1 tablet (30 mg) by mouth daily  Quantity: 90 tablet  Refills: 0     loratadine 10 MG tablet  Commonly known as: CLARITIN      Dose: 10 mg  Take 10 mg by mouth nightly as needed  Refills: 0     metFORMIN 1000 MG tablet  Commonly known as: GLUCOPHAGE  Used for: Type 2 diabetes mellitus without complication, without long-term current use of insulin (H)      TAKE 1 TABLET BY MOUTH TWICE A DAY WITH MEALS  Quantity: 180 tablet  Refills: 1     MULTIVITAMIN ADULTS PO      Dose: 1 tablet  Take 1 tablet by mouth daily  Refills: 0     tamsulosin 0.4 MG  capsule  Commonly known as: FLOMAX      Dose: 1 capsule  Take 1 capsule by mouth daily.  Refills: 0     torsemide 20 MG tablet  Commonly known as: DEMADEX  Used for: Essential hypertension      TAKE 1 TABLET BY MOUTH EVERY DAY  Quantity: 90 tablet  Refills: 2           Where to get your medicines      These medications were sent to [facility] [place], [place] - [address] [address]  [address] [address] [facility] [address]    Phone: [phone]     Cadexomer Iodine (topical) 0.9% 0.9 % Gel gel    clindamycin 300 MG capsule          Discharge diet:Orders Placed This Encounter      Diet    diabetic diet      Discharge activity:Activity as tolerated      Discharge follow-up:    Follow up with primary care provider in 7 days or earlier if symptoms return or gets worse.    Follow up with consultant as instructed  with podiatry as needed      Other instructions:    We discussed with patient/family about detail discharge instructions as well as discharge medications above including potential risks,side effects and benefits.Patient/family understood benefits and potential serious side effects of taking these medications and need to follow up with PCP if the patient develops complications.  Patient is also advised to see a doctor immediately for severe symptoms.        Major procedure performed/  Significant Diagnostic Studies:            Results for orders placed or performed during the hospital encounter of 07/28/21   Foot  XR, G/E 3 views, right    Narrative    EXAM: XR FOOT RIGHT G/E 3 VIEWS  LOCATION: [facility]  DATE/TIME: 7/28/2021 3:54 AM    INDICATION: Right foot wound.  COMPARISON: None.      Impression    IMPRESSION: No fracture or dislocation. Arthritis in the mid foot and first MTP joint. No bone destruction to suggest osteomyelitis. Soft tissue swelling over the foot.       No results for input(s): WBC, HGB, HCT, MCV, PLT in the last 168 hours.  No results for  "input(s): CULT in the last 168 hours.  No results for input(s): NA, POTASSIUM, CHLORIDE, CO2, ANIONGAP, GLC, BUN, CR, GFRESTIMATED, GFRESTBLACK, SHANNAN, MAG, PHOS, PROTTOTAL, ALBUMIN, BILITOTAL, ALKPHOS, AST, ALT in the last 168 hours.    No results for input(s): GLC, BGM in the last 168 hours.    No results for input(s): INR in the last 168 hours.      Incidental findings that was discussed with patient/family and need follow up with PCP: none     Pending Results:       Unresulted Labs Ordered in the Past 30 Days of this Admission     No orders found from 6/28/2021 to 7/29/2021.             Patient Allergies:       Allergies   Allergen Reactions     Penicillins Anaphylaxis     \"anaphylactic\"     Sulfa Drugs      \"itchy rash, swelling of face and hands\"     Ancef [Cefazolin] Rash         Disposition:     Disposition: home    Discharge needs: Wound care         I saw and evaluated the patient on day of discharge and  discharge instructions reviewed  and  all the patient's questions and concerns addressed. Over 30 minutes spent on discharge and coordination of discharge process for this patient.      Disclaimer: This note consists of symbols derived from keyboarding, dictation and/or voice recognition software. As a result, there may be errors in the script that have gone undetected. Please consider this when interpreting information found in this chart      "

## 2021-08-11 ENCOUNTER — OFFICE VISIT (OUTPATIENT)
Dept: PODIATRY | Facility: CLINIC | Age: 76
End: 2021-08-11
Payer: COMMERCIAL

## 2021-08-11 VITALS
WEIGHT: 249 LBS | HEIGHT: 72 IN | BODY MASS INDEX: 33.72 KG/M2 | SYSTOLIC BLOOD PRESSURE: 124 MMHG | DIASTOLIC BLOOD PRESSURE: 70 MMHG

## 2021-08-11 DIAGNOSIS — I87.2 EDEMA OF BOTH LOWER EXTREMITIES DUE TO PERIPHERAL VENOUS INSUFFICIENCY: ICD-10-CM

## 2021-08-11 DIAGNOSIS — L97.522 DIABETIC ULCER OF OTHER PART OF LEFT FOOT ASSOCIATED WITH TYPE 2 DIABETES MELLITUS, WITH FAT LAYER EXPOSED (H): ICD-10-CM

## 2021-08-11 DIAGNOSIS — Z89.422 H/O AMPUTATION OF LESSER TOE, LEFT (H): ICD-10-CM

## 2021-08-11 DIAGNOSIS — E11.621 DIABETIC ULCER OF OTHER PART OF RIGHT FOOT ASSOCIATED WITH TYPE 2 DIABETES MELLITUS, WITH FAT LAYER EXPOSED (H): ICD-10-CM

## 2021-08-11 DIAGNOSIS — M20.41 HAMMERTOE, BILATERAL: ICD-10-CM

## 2021-08-11 DIAGNOSIS — E11.42 DIABETIC POLYNEUROPATHY ASSOCIATED WITH TYPE 2 DIABETES MELLITUS (H): Primary | ICD-10-CM

## 2021-08-11 DIAGNOSIS — M20.22 HALLUX RIGIDUS OF BOTH FEET: ICD-10-CM

## 2021-08-11 DIAGNOSIS — E11.621 DIABETIC ULCER OF OTHER PART OF LEFT FOOT ASSOCIATED WITH TYPE 2 DIABETES MELLITUS, WITH FAT LAYER EXPOSED (H): ICD-10-CM

## 2021-08-11 DIAGNOSIS — M20.21 HALLUX RIGIDUS OF BOTH FEET: ICD-10-CM

## 2021-08-11 DIAGNOSIS — M20.42 HAMMERTOE, BILATERAL: ICD-10-CM

## 2021-08-11 DIAGNOSIS — L97.512 DIABETIC ULCER OF OTHER PART OF RIGHT FOOT ASSOCIATED WITH TYPE 2 DIABETES MELLITUS, WITH FAT LAYER EXPOSED (H): ICD-10-CM

## 2021-08-11 PROCEDURE — 99213 OFFICE O/P EST LOW 20 MIN: CPT | Mod: 25 | Performed by: PODIATRIST

## 2021-08-11 PROCEDURE — 11042 DBRDMT SUBQ TIS 1ST 20SQCM/<: CPT | Performed by: PODIATRIST

## 2021-08-11 RX ORDER — MUPIROCIN 20 MG/G
OINTMENT TOPICAL DAILY
Qty: 30 G | Refills: 2 | Status: SHIPPED | OUTPATIENT
Start: 2021-08-11 | End: 2021-12-03

## 2021-08-11 ASSESSMENT — MIFFLIN-ST. JEOR: SCORE: 1892.46

## 2021-08-11 NOTE — PATIENT INSTRUCTIONS
"Thank you for choosing [facility]!    DR. RANDHAWA'S CLINIC:  [place] [facility] SCHEDULE SURGERY: [phone]   [address] BILLING QUESTIONS: [phone]   [place] [address] APPOINTMENTS: [phone]   PH: [phone] CONSUMER PRICE LINE:[phone]   FAX: [phone]      Follow up: 3-4 weeks    Next steps:  DH2 shoe at one of the following locations:   Home Medical Equipment:  1. [facility]    [facility] -[address]   [address]   [place] [phone]     2. [facility] - [address], [phone]          DIABETES AND YOUR FEET  Diabetes can result in several problems in the feet including ulcers (open sores) and amputations. Two of the most important reasons why people develop foot problems when they have diabetes is : 1. Neuropathy (loss of feeling)  2. Vascular disease (loss or decrease of blood flow).    Neuropathy is a term used to describe a loss of nerve function.  Patients with diabetes are at risk of developing neuropathy if their sugars continue to run high and are above the normal value. One theory for neuropathy is that the \"extra\" sugar in the body enters the nerves and is broken down. These by-products build up in the nerve causing it to swell and impairing nerve function. Often times, this can be prevented by controlling your sugars, dieting and exercise.    When a person develops neuropathy, they usually begin to feel numbness or tingling in their feet and sometime in their legs.  Other symptoms may include painful burning or hot feet, tingling or feeling like insects or ants are crawling on your feet or legs.  If the diabetes is sever and the sugars run high for long periods of time, neuropathy can also occur in the hands.    Vascular disease  is a term used to describe a loss or decrease " in circulation (blood flow). There is a problem in getting blood and oxygen to areas that need it. Similar to neuropathy, sugars can build up in the walls of the arteries (blood vessels) and cause them to become swollen, thickened and hardened. This decreases the amount of blood that can go to an area that needs it. Though this is common in the legs of diabetic patients, it can also affect other arteries (blood vessels) in the body such as in the heart and eyes.    In the legs, vascular disease usually results in cramping. Patients who develop leg cramps after walking the same distance every time (i.e. One block, half a mile, ect.) need to let their doctors know so that their circulation may be checked. Cramps causing severe pain in the feet and/or legs while sleeping and the cramps go away when you stand or hang your legs off the side of the bed, may also be a sign of poor blood circulation.  Occasional cramping in cold weather or on rare occasions with activity may not be due to poor circulation, but you should inform your doctor.    PREVENTION OF THESE DISEASES  The key to prevention is good blood sugar control. Poor blood sugar control is a big reason many of these problems start. Physical activity (exercise) is a very good way to help decrease your blood sugars. Exercise can lower your blood sugar, blood pressure, and cholesterol. It also reduces your risk for heart disease and stroke, relieves stress, and strengthens your heart, muscles and bones.  In addition, regular activity helps insulin work better, improves your blood circulation, and keeps your joints flexible. If you're trying to lose weight, a combination of exercise and wise food choices can help you reach your target weight and maintain it.      PAIN MANAGEMENT (**Please speak with your primary doctor about any medications**)  1.Blood Sugar Control - Most important  2. Medications such as:  Amytriptylline, duloxetine, gabapentin, lyrica, tramadol  "(talk with your primary care doctor about this).     NUTRITION:  Nutrition is also important to help with healing. If your body does not have what it needs, it can't heal.   1. Increasing your protein intake is important.  With wounds you need 60-90gm of protein a day to help with healing. Over the counter protein shakes such as Antonio, Glucerna, Ensure, ect... can help to supplement your daily protein intake.   2. It is also important to take Vitamins to help with healing.  Vitamins such as B12, B6 and Vitamin D3 are important for healing. These can be gotten over the counter at pharmacies or at stores like [employer] or the [employer].    I can also prescribe a dietary supplement called \"Rheumate\" that has a lot of essential vitamins in one capsule.  This may not be covered by insurance though.     FOOT CARE RECOMMENDATIONS   1. Wash your feet with lukewarm water and a mild soap and then dry them thoroughly, especially between the toes.     2. Examine your feet daily looking for cuts, corns, blisters, cracks, ect, especially after wearing new shoes. Make sure to look between your toes. If you cannot see the bottom of your feet, set a mirror on the floor and hold your foot over it, or ask a spouse, friend or family member to examine your feet for you. Contact your doctor immediately if new problems are noted or if sores are not healing.     3. Immediately apply moisturizer to the tops and bottoms of your feet, avoiding areas between the toes. Hand lotion (Intesive Care, Marta, Eucerin, Neutrogena, Curel, ect) is sufficient unless your doctor prescribes a medicated lotion. Apply sunscreen to your feet when going swimming outside.     4. Use clean comfortable shoes, wear white socks (if you have any bleeding or drainage, you will see it on white socks). Socks should not have thick seams or cut off the circulation around the leg. Break in new shoes slowly and rotate with older shoes until broken in. Check the inside of " your shoes with your hand to look for areas of irritation or objects that may have fallen into your shoes.       5. Keep slippers by the side of your bed for use during the night.     6.  Shoes should be fitted by a professional and should not cause areas of irritation.  Check your feet regularly when wearing a new pair of shoes and replace them as needed.     7.  Talk to your doctor about proper exercise. Exercise and stretching stimulate blood flow to your feet and maintain proper glucose levels.     8.  Monitor your blood glucose level as instructed by your doctor. Notify your doctor immediately if your blood sugar is abnormally high or low.    9. Cut your nails straight across, but then gently round any sharp edges with a cardboard nail file. If you have neuropathy, peripheral vascular disease or cannot see that well to trim your own toenails contact [employer] ([phone]) or [employer] ([phone]).      THINGS TO AVOID DOING   1.  Do not soak your feet if you have an open sore. Use only lukewarm water and always check the temperature with your hand as hot water can easily burn your feet.       2.  Never use a hot water bottle or heating pad on your feet. Also do not apply cold compresses to your feet. With decreased sensation, you could burn or freeze your feet.       3.  Do not apply any of these to your feet:    -  Over the counter medicine for corns or warts    -  Harsh chemicals like boric acid    -  Do not self-treat corns, cuts, blisters or infections. Always consult your doctor.       4.  Do not wear sandals, slippers or walk barefoot, especially on hot sand or concrete or other harsh surfaces.     5.  If you smoke, stop!!!        FOOT ULCER (WOUND) EDUCATION  Ulceration ofthe foot involves a break or hole in the skin. Skin is our best protection against infection. Skin is quite durable, however, the underlying tissues are fragile. For this reason, the wound is likely to deepen rapidly. Deep  wounds usually get infected and require amputation. Prompt healing is therefore essential to avoid limb loss.     Foot ulcers do not heal without intervention. Walking on the foot and living your normal life is not typically compatible with healing the sore. Successful healing will require several months of significant alteration of your daily activities.   Ulcer complications frequently develop. This primarily includes infection of skin, which then spreads deep into your joints, bones and tendons. Spreading infection may travel up your leg and into other parts of your body. Deep infection is usually treated with amputation ofpart ofyour foot or your leg. Signs of infection include fever, chills, nausea, vomiting, erratic blood sugars, local redness, pus, strong odor and localized warmth. Signs of infection should be taken seriously. Prompt evaluation in the clinic or hospital emergency room is required.   Ulcer treatment requires debridement or surgical removal of devitalized tissue. Your doctor will trim away callused, moistened, unhealthy tissue from the wound surface and margin. This helps to clean the wound and allows proper inspection. Debridement also stimulates healing even though the wound originally appears larger. Expect some bleeding with each debridement. You will be given instruction regarding wound bandaging. This often includes ointment and gauze. Avoid tape directly on the skin. Hand washing is essential since most infections will come from your fingertips. Ulcer care requires a no touch technique. Your fingers should not touch the margin or base of the wound.    HELPFUL HEALING TIPS:  1. Debridement: Getting rid of bad tissue makes way for good tissue to promote healing  2. Addressing Foot Deformities: Hammertoes and bunions can cause increased pressure  3. Pressure Reduction: If pressure remains to the wound, it won't heal  4. Good Pulses: If bloodflow is not getting to the foot, the ulcer will  not heal  5. Good Nutrition: If you are not getting proper nutrition your body can't heal.Protein! At least 90g a day.  Supplements are a good way to help get this, such as Antonio, Glucerna, Ensure. Also taking 5000units of Vitamin D a day.   6. Infection Control: Keep the ulcer clean with wound cleanser. DO NOT SOAK IT!  7. Moisture Control: Keep edema down and make sure that drainage is getting pulled away from the ulcer    IMPORTANCE OF DEBRIDEMENT    Reduces bioburden to help control or reduce infection. Even if an ulcer is not  infected,  the bacterial bioburden causes increased local inflammation.    Allows more accurate visualization of the wound base and edges, which allows for more precise staging.    Removes necrotic/non-viable tissue, which impedes wound healing, causes protein loss and can be a nidus for infection.    Stimulates new circulation (angiogenesis) and allows adequate oxygen delivery to the wound.    Removes undermining and tunneling, and may help reduce abscess formation.    Releases healing growth factors at the edge of the wound.    Prepares the wound bed by leaving only tissues that are capable of regenerating.

## 2021-08-11 NOTE — PROGRESS NOTES
Podiatry / Foot and Ankle Surgery Progress Note    August 11, 2021    Subject: Patient was seen for multiple ulcerations to both feet.  Patient states that he recently got his new shoes a month or 2 ago but they were very painful.  He developed 3 ulcers on each foot and was recently in the hospital for IV antibiotics due to right foot infection.  Currently patient denies fever, nausea, vomiting.  No pain to the feet but does have baseline neuropathy.  He has not been putting anything on the ulcerations just Band-Aids daily.    Objective:  Vitals: /70   Ht 1.829 m (6')   Wt 112.9 kg (249 lb)   BMI 33.77 kg/m    BMI= Body mass index is 33.77 kg/m .    A1C: 8.1 (4/2021)    General:  Patient is alert and orientated.  NAD.    Vascular:  DP and PT pulses are palpable.  significant Edema and varicosities noted.  CFT's < 3secs.  Skin temp is normal      Neuro:  Light and gross touch sensation is absent to feet.      Derm: Right foot-full thickness ulcer to the plantar aspect of the right IPJ.  After debridement this measures 1.5 cm x 0.6 cm x 0.3 cm.  Base of the wound is granular.  No purulent drainage, redness or signs of acute infection noted.  There is heavy clear serous drainage noted from the wound.  Also has a full-thickness ulceration to the dorsal aspect of the right foot that measures 0.5 x 0.2 x 0.2 cm and another ulcer to the dorsal aspect of the right fifth PIPJ measuring 0.5 x 0.4 x 0.2 cm.  Both of these wounds have a minimally fibrous base.  No surrounding erythema, purulent drainage or malodor noted.    Left foot-full-thickness ulceration to the plantar aspect of the left great toe measuring 1.0 cm x 1.0 cm x 0.1 cm.  Base of this wound is granular.  There ulceration to the plantar aspect of the left first metatarsal head measuring 0.5 cm x 0.2 cm x 0.1 cm.  Again base of the wound is granular.  Another full-thickness ulceration to the dorsal aspect of the left fifth toe measuring approximately 0.5  cm x 0.5 cm x 0.2 cm.  No redness or signs of infection noted to either foot.  There is moderate amounts of clear serous drainage noted.     Musculoskeletal:  Decrease arch height. Lateral deviation of the right and left halluxes.  Significant decreased range of motion of the right first metatarsal phalangeal joint and inner phalangeal joint.     Assessment:     Type 2 diabetes mellitus with peripheral vascular disease (H)  Diabetic polyneuropathy associated with type 2 diabetes mellitus (H)  Chronic ulcer of right great toe with fat layer exposed (H)  Hallux rigidus of both feet  Edema of both lower extremities due to peripheral venous insufficiency     Medical Decision Making/Plan: At this time the wounds were debrided.  Please see procedure note below.  We will have him apply Bactroban to the ulcerations daily with Band-Aids.  Recommend elevating the feet to help with swelling as he notes he cannot wear his compression socks has difficulty putting them on.  Discussed that the swelling can contribute to the delayed healing of the wounds.  He was given an order for a DH II shoe to wear so that he has 1 for both feet.  This will help to take pressure off of the ulcerations.  He will wear this at all times when he is walking.  We did give him another order to follow-up with orthotics for possible shoes as his current ones-lead to ulceration.  Clinically there is no acute signs of infection so we will hold off on antibiotics at this time.  We will have him follow-up in 3 weeks for reassessment of the ulcerations.  All questions were answered to patient satisfaction he will call further questions or concerns.      Procedure: After verbal consent, excisional debridement was performed on ulcers.  #15 blade was used to debride ulcers down to and including subcutaneous tissue. Bleeding controlled with light pressure.   No drainage noted.  No anesthesia was used due to neuropathy. Dry dressing applied to foot.  Patient  tolerated procedure well.     Patient Risk Factor:  Patient is a high risk factor for infection.     Ayaka Azevedo DPM, Podiatry/Foot and Ankle Surgery

## 2021-08-11 NOTE — LETTER
8/11/2021         RE: Lance Ibrahim  [address] [place] [address]        Dear Colleague,    Thank you for referring your patient, Lance Ibrahim, to the [facility]. Please see a copy of my visit note below.    Podiatry / Foot and Ankle Surgery Progress Note    August 11, 2021    Subject: Patient was seen for multiple ulcerations to both feet.  Patient states that he recently got his new shoes a month or 2 ago but they were very painful.  He developed 3 ulcers on each foot and was recently in the hospital for IV antibiotics due to right foot infection.  Currently patient denies fever, nausea, vomiting.  No pain to the feet but does have baseline neuropathy.  He has not been putting anything on the ulcerations just Band-Aids daily.    Objective:  Vitals: /70   Ht 1.829 m (6')   Wt 112.9 kg (249 lb)   BMI 33.77 kg/m    BMI= Body mass index is 33.77 kg/m .    A1C: 8.1 (4/2021)    General:  Patient is alert and orientated.  NAD.    Vascular:  DP and PT pulses are palpable.  significant Edema and varicosities noted.  CFT's < 3secs.  Skin temp is normal      Neuro:  Light and gross touch sensation is absent to feet.      Derm: Right foot-full thickness ulcer to the plantar aspect of the right IPJ.  After debridement this measures 1.5 cm x 0.6 cm x 0.3 cm.  Base of the wound is granular.  No purulent drainage, redness or signs of acute infection noted.  There is heavy clear serous drainage noted from the wound.  Also has a full-thickness ulceration to the dorsal aspect of the right foot that measures 0.5 x 0.2 x 0.2 cm and another ulcer to the dorsal aspect of the right fifth PIPJ measuring 0.5 x 0.4 x 0.2 cm.  Both of these wounds have a minimally fibrous base.  No surrounding erythema, purulent drainage or malodor noted.    Left foot-full-thickness ulceration to the plantar aspect of the left great toe measuring 1.0 cm x 1.0 cm x 0.1 cm.  Base of this wound is  granular.  There ulceration to the plantar aspect of the left first metatarsal head measuring 0.5 cm x 0.2 cm x 0.1 cm.  Again base of the wound is granular.  Another full-thickness ulceration to the dorsal aspect of the left fifth toe measuring approximately 0.5 cm x 0.5 cm x 0.2 cm.  No redness or signs of infection noted to either foot.  There is moderate amounts of clear serous drainage noted.     Musculoskeletal:  Decrease arch height. Lateral deviation of the right and left halluxes.  Significant decreased range of motion of the right first metatarsal phalangeal joint and inner phalangeal joint.     Assessment:     Type 2 diabetes mellitus with peripheral vascular disease (H)  Diabetic polyneuropathy associated with type 2 diabetes mellitus (H)  Chronic ulcer of right great toe with fat layer exposed (H)  Hallux rigidus of both feet  Edema of both lower extremities due to peripheral venous insufficiency     Medical Decision Making/Plan: At this time the wounds were debrided.  Please see procedure note below.  We will have him apply Bactroban to the ulcerations daily with Band-Aids.  Recommend elevating the feet to help with swelling as he notes he cannot wear his compression socks has difficulty putting them on.  Discussed that the swelling can contribute to the delayed healing of the wounds.  He was given an order for a DH II shoe to wear so that he has 1 for both feet.  This will help to take pressure off of the ulcerations.  He will wear this at all times when he is walking.  We did give him another order to follow-up with orthotics for possible shoes as his current ones-lead to ulceration.  Clinically there is no acute signs of infection so we will hold off on antibiotics at this time.  We will have him follow-up in 3 weeks for reassessment of the ulcerations.  All questions were answered to patient satisfaction he will call further questions or concerns.      Procedure: After verbal consent, excisional  debridement was performed on ulcers.  #15 blade was used to debride ulcers down to and including subcutaneous tissue. Bleeding controlled with light pressure.   No drainage noted.  No anesthesia was used due to neuropathy. Dry dressing applied to foot.  Patient tolerated procedure well.     Patient Risk Factor:  Patient is a high risk factor for infection.     Ayaka Azevedo DPM, Podiatry/Foot and Ankle Surgery        Again, thank you for allowing me to participate in the care of your patient.        Sincerely,        Ayaka Azevedo DPM, Podiatry/Foot and Ankle Surgery

## 2021-09-29 ENCOUNTER — LAB (OUTPATIENT)
Dept: LAB | Facility: CLINIC | Age: 76
End: 2021-09-29
Payer: COMMERCIAL

## 2021-09-29 ENCOUNTER — OFFICE VISIT (OUTPATIENT)
Dept: CARDIOLOGY | Facility: CLINIC | Age: 76
End: 2021-09-29
Payer: COMMERCIAL

## 2021-09-29 VITALS
OXYGEN SATURATION: 97 % | DIASTOLIC BLOOD PRESSURE: 70 MMHG | BODY MASS INDEX: 34.05 KG/M2 | SYSTOLIC BLOOD PRESSURE: 122 MMHG | HEART RATE: 100 BPM | WEIGHT: 251.4 LBS | HEIGHT: 72 IN

## 2021-09-29 DIAGNOSIS — I25.10 CORONARY ARTERY DISEASE INVOLVING NATIVE CORONARY ARTERY OF NATIVE HEART WITHOUT ANGINA PECTORIS: ICD-10-CM

## 2021-09-29 DIAGNOSIS — I42.9 CARDIOMYOPATHY, UNSPECIFIED TYPE (H): ICD-10-CM

## 2021-09-29 DIAGNOSIS — R06.02 SHORTNESS OF BREATH: ICD-10-CM

## 2021-09-29 DIAGNOSIS — I10 ESSENTIAL HYPERTENSION: ICD-10-CM

## 2021-09-29 DIAGNOSIS — R06.09 DYSPNEA ON EXERTION: Primary | ICD-10-CM

## 2021-09-29 DIAGNOSIS — I50.32 CHRONIC DIASTOLIC HEART FAILURE (H): ICD-10-CM

## 2021-09-29 DIAGNOSIS — R00.0 TACHYCARDIA: ICD-10-CM

## 2021-09-29 DIAGNOSIS — R07.9 CHEST PAIN, UNSPECIFIED TYPE: ICD-10-CM

## 2021-09-29 LAB
ALT SERPL W P-5'-P-CCNC: 73 U/L (ref 0–70)
ANION GAP SERPL CALCULATED.3IONS-SCNC: 9 MMOL/L (ref 3–14)
BUN SERPL-MCNC: 19 MG/DL (ref 7–30)
CALCIUM SERPL-MCNC: 9.1 MG/DL (ref 8.5–10.1)
CHLORIDE BLD-SCNC: 105 MMOL/L (ref 94–109)
CHOLEST SERPL-MCNC: 117 MG/DL
CO2 SERPL-SCNC: 25 MMOL/L (ref 20–32)
CREAT SERPL-MCNC: 1.18 MG/DL (ref 0.66–1.25)
FASTING STATUS PATIENT QL REPORTED: YES
GFR SERPL CREATININE-BSD FRML MDRD: 59 ML/MIN/1.73M2
GLUCOSE BLD-MCNC: 179 MG/DL (ref 70–99)
HDLC SERPL-MCNC: 52 MG/DL
LDLC SERPL CALC-MCNC: 48 MG/DL
NONHDLC SERPL-MCNC: 65 MG/DL
POTASSIUM BLD-SCNC: 4.5 MMOL/L (ref 3.4–5.3)
SODIUM SERPL-SCNC: 139 MMOL/L (ref 133–144)
TRIGL SERPL-MCNC: 83 MG/DL

## 2021-09-29 PROCEDURE — 80061 LIPID PANEL: CPT | Performed by: INTERNAL MEDICINE

## 2021-09-29 PROCEDURE — 84460 ALANINE AMINO (ALT) (SGPT): CPT | Performed by: INTERNAL MEDICINE

## 2021-09-29 PROCEDURE — 99214 OFFICE O/P EST MOD 30 MIN: CPT | Performed by: INTERNAL MEDICINE

## 2021-09-29 PROCEDURE — 36415 COLL VENOUS BLD VENIPUNCTURE: CPT | Performed by: INTERNAL MEDICINE

## 2021-09-29 PROCEDURE — 80048 BASIC METABOLIC PNL TOTAL CA: CPT | Performed by: INTERNAL MEDICINE

## 2021-09-29 RX ORDER — LABETALOL 200 MG/1
200 TABLET, FILM COATED ORAL 2 TIMES DAILY
Qty: 180 TABLET | COMMUNITY
Start: 2021-09-29 | End: 2021-10-01

## 2021-09-29 ASSESSMENT — MIFFLIN-ST. JEOR: SCORE: 1903.34

## 2021-09-29 NOTE — PROGRESS NOTES
HPI and Plan:   See dictation    Orders Placed This Encounter   Procedures     Basic metabolic panel     Lipid Profile     ALT     Follow-Up with Cardiologist     EKG 12-lead complete w/read - Clinics (to be scheduled)     Echocardiogram Complete       Orders Placed This Encounter   Medications     DISCONTD: LABETALOL HCL PO     Sig: Take by mouth 2 times daily     labetalol (NORMODYNE) 200 MG tablet     Sig: Take 1 tablet (200 mg) by mouth 2 times daily     Dispense:  180 tablet       Medications Discontinued During This Encounter   Medication Reason     LABETALOL HCL PO Reorder         Encounter Diagnoses   Name Primary?     Essential hypertension      Dyspnea on exertion Yes     Chest pain, unspecified type      Cardiomyopathy, unspecified type (H)      Shortness of breath       Chronic diastolic heart failure (H)      Coronary artery disease involving native coronary artery of native heart without angina pectoris      Tachycardia        CURRENT MEDICATIONS:  Current Outpatient Medications   Medication Sig Dispense Refill     amLODIPine (NORVASC) 5 MG tablet TAKE 1 TABLET BY MOUTH EVERY DAY 90 tablet 3     aspirin (ASA) 81 MG EC tablet Take 1 tablet (81 mg) by mouth daily 100 tablet 3     atorvastatin (LIPITOR) 40 MG tablet Take 1 tablet (40 mg) by mouth daily 90 tablet 0     colchicine (MITIGARE) 0.6 MG capsule TAKE 1 CAPSULE (0.6 MG) BY MOUTH 2 TIMES DAILY 180 capsule 1     Ferrous Gluconate 240 (27 Fe) MG TABS Take 1 tablet by mouth daily        finasteride (PROSCAR) 5 MG tablet Take 5 mg by mouth daily.       fluticasone (FLONASE) 50 MCG/ACT spray Spray 1 spray into both nostrils nightly as needed        isosorbide mononitrate (IMDUR) 30 MG 24 hr tablet Take 1 tablet (30 mg) by mouth daily 90 tablet 0     labetalol (NORMODYNE) 200 MG tablet Take 1 tablet (200 mg) by mouth 2 times daily 180 tablet      lisinopril (ZESTRIL) 10 MG tablet TAKE 1 TABLET BY MOUTH EVERY DAY 90 tablet 1     loratadine (CLARITIN) 10  "MG tablet Take 10 mg by mouth nightly as needed        metFORMIN (GLUCOPHAGE) 1000 MG tablet TAKE 1 TABLET BY MOUTH TWICE A DAY WITH MEALS 180 tablet 1     Multiple Vitamins-Minerals (MULTIVITAMIN ADULTS PO) Take 1 tablet by mouth daily        tamsulosin (FLOMAX) 0.4 MG 24 hr capsule Take 1 capsule by mouth daily.       torsemide (DEMADEX) 20 MG tablet TAKE 1 TABLET BY MOUTH EVERY DAY 90 tablet 2     Cadexomer Iodine, topical, 0.9% (IODOSORB) 0.9 % GEL gel Apply to bilateral foot wounds as instructed daily (Patient not taking: Reported on 8/11/2021) 10 g 0     diclofenac (VOLTAREN) 1 % topical gel Place onto the skin daily as needed        mupirocin (BACTROBAN) 2 % external ointment Apply topically daily 30 g 2       ALLERGIES     Allergies   Allergen Reactions     Penicillins Anaphylaxis     \"anaphylactic\"     Sulfa Drugs      \"itchy rash, swelling of face and hands\"     Ancef [Cefazolin] Rash       PAST MEDICAL HISTORY:  Past Medical History:   Diagnosis Date     Arthritis     osteoarthritis knees     BPH      CAD (coronary artery disease)     subtotal occlusion in the small distal LAD      Cardiomyopathy      Cerebral artery occlusion with cerebral infarction     TIA 1993, no residual     Chronic kidney disease      Chronic rhinitis      HTN (hypertension)      Hyperlipidemia      Kidney stone      PVD (peripheral vascular disease)      Sleep apnea     doesn't use cpap     TIA (transient ischaemic attack) 1993     Type 2 diabetes mellitus - 11/08/2018     Ureteral stone        PAST SURGICAL HISTORY:  Past Surgical History:   Procedure Laterality Date     AMPUTATE TOE(S) Left 10/15/2018    Procedure: AMPUTATE TOE(S);  Left third toe amputation;  Surgeon: Ayaka Azevedo DPM, Podiatry/Foot and Ankle Surgery;  Location:  OR     ARTHROPLASTY KNEE Right 12/07/2018    Procedure: Right total knee arthroplasty;  Surgeon: Issa Cunha MD;  Location:  OR     COLONOSCOPY  03/09/2013    Procedure: COLONOSCOPY;  " COLONOSCOPY;  Surgeon: Chau Hogan MD;  Location:  GI     CV HEART CATHETERIZATION WITH POSSIBLE INTERVENTION Left 2019    Procedure: Coronary Angiogram;  Surgeon: Nas Linda MD;  Location:  HEART CARDIAC CATH LAB     Diastasis recti repair       EXTRACAPSULAR CATARACT EXTRATION WITH INTRAOCULAR LENS IMPLANT Left 2017     EXTRACAPSULAR CATARACT EXTRATION WITH INTRAOCULAR LENS IMPLANT Right      FOOT SURGERY  2013    cyst removal      HERNIA REPAIR  2004    ventral      ORIF Shoulder Left      Ventral hernia repair NOS  1987       FAMILY HISTORY:  Family History   Problem Relation Age of Onset     Family History Negative Mother          86 yo     Cancer Father          76 yo brain     Diabetes Maternal Grandfather          89 yo     Alcohol/Drug Paternal Grandfather              Colon Cancer No family hx of        SOCIAL HISTORY:  Social History     Socioeconomic History     Marital status:      Spouse name: None     Number of children: None     Years of education: None     Highest education level: None   Occupational History     Employer: SELF   Tobacco Use     Smoking status: Former Smoker     Packs/day: 0.00     Quit date: 3/17/1993     Years since quittin.5     Smokeless tobacco: Never Used   Substance and Sexual Activity     Alcohol use: Not Currently     Drug use: No     Sexual activity: Never   Other Topics Concern     Parent/sibling w/ CABG, MI or angioplasty before 65F 55M? Not Asked   Social History Narrative     None     Social Determinants of Health     Financial Resource Strain:      Difficulty of Paying Living Expenses:    Food Insecurity:      Worried About Running Out of Food in the Last Year:      Ran Out of Food in the Last Year:    Transportation Needs:      Lack of Transportation (Medical):      Lack of Transportation (Non-Medical):    Physical Activity:      Days of Exercise per Week:      Minutes of Exercise  per Session:    Stress:      Feeling of Stress :    Social Connections:      Frequency of Communication with Friends and Family:      Frequency of Social Gatherings with Friends and Family:      Attends Judaism Services:      Active Member of Clubs or Organizations:      Attends Club or Organization Meetings:      Marital Status:    Intimate Partner Violence:      Fear of Current or Ex-Partner:      Emotionally Abused:      Physically Abused:      Sexually Abused:        Review of Systems:  Skin:  Negative       Eyes:  Positive for glasses    ENT:  Positive for hearing loss    Respiratory:  Positive for sleep apnea     Cardiovascular:    edema;Positive for    Gastroenterology: Negative      Genitourinary:  Negative      Musculoskeletal:  Positive for gout    Neurologic:  Negative      Psychiatric:  Negative      Heme/Lymph/Imm:  Negative      Endocrine:  Positive for diabetes      Physical Exam:  Vitals: /70 (BP Location: Right arm, Patient Position: Sitting, Cuff Size: Adult Large)   Pulse 100   Ht 1.829 m (6')   Wt 114 kg (251 lb 6.4 oz)   SpO2 97%   BMI 34.10 kg/m      Constitutional:  cooperative, alert and oriented, well developed, well nourished, in no acute distress obese      Skin:  warm and dry to the touch     scattered excoriations on his LEs from scratching due to dry skin    Head:  normocephalic        Eyes:  pupils equal and round;conjunctivae and lids unremarkable;sclera white        Lymph:No Cervical lymphadenopathy present     ENT:  no pallor or cyanosis        Neck:  carotid pulses are full and equal bilaterally, JVP normal, no carotid bruit   no JVD appreciated but thick neck    Respiratory:  clear to auscultation         Cardiac: regular rhythm;normal S1 and S2   S4         no significant murmur appreciated   pulses full and equal                                        GI:    obese      Extremities and Muscular Skeletal:  no deformities, clubbing, cyanosis, erythema observed    bilateral LE edema;pitting;1+ (Wearing special shoes for dressing of ulcers on both feet.)          Neurological:  no gross motor deficits;affect appropriate        Psych:  Alert and Oriented x 3        CC  No referring provider defined for this encounter.

## 2021-09-29 NOTE — LETTER
9/29/2021    Anh Spivey NP  [address] [facility] [address]    RE: Lance Ibrahim       Dear Colleague,    I had the pleasure of seeing Lance Ibrahim in the [facility].    HPI and Plan:   See dictation    Orders Placed This Encounter   Procedures     Basic metabolic panel     Lipid Profile     ALT     Follow-Up with Cardiologist     EKG 12-lead complete w/read - Clinics (to be scheduled)     Echocardiogram Complete       Orders Placed This Encounter   Medications     DISCONTD: LABETALOL HCL PO     Sig: Take by mouth 2 times daily     labetalol (NORMODYNE) 200 MG tablet     Sig: Take 1 tablet (200 mg) by mouth 2 times daily     Dispense:  180 tablet       Medications Discontinued During This Encounter   Medication Reason     LABETALOL HCL PO Reorder         Encounter Diagnoses   Name Primary?     Essential hypertension      Dyspnea on exertion Yes     Chest pain, unspecified type      Cardiomyopathy, unspecified type (H)      Shortness of breath       Chronic diastolic heart failure (H)      Coronary artery disease involving native coronary artery of native heart without angina pectoris      Tachycardia        CURRENT MEDICATIONS:  Current Outpatient Medications   Medication Sig Dispense Refill     amLODIPine (NORVASC) 5 MG tablet TAKE 1 TABLET BY MOUTH EVERY DAY 90 tablet 3     aspirin (ASA) 81 MG EC tablet Take 1 tablet (81 mg) by mouth daily 100 tablet 3     atorvastatin (LIPITOR) 40 MG tablet Take 1 tablet (40 mg) by mouth daily 90 tablet 0     colchicine (MITIGARE) 0.6 MG capsule TAKE 1 CAPSULE (0.6 MG) BY MOUTH 2 TIMES DAILY 180 capsule 1     Ferrous Gluconate 240 (27 Fe) MG TABS Take 1 tablet by mouth daily        finasteride (PROSCAR) 5 MG tablet Take 5 mg by mouth daily.       fluticasone (FLONASE) 50 MCG/ACT spray Spray 1 spray into both nostrils nightly as needed        isosorbide mononitrate (IMDUR) 30 MG 24 hr tablet Take 1 tablet  "(30 mg) by mouth daily 90 tablet 0     labetalol (NORMODYNE) 200 MG tablet Take 1 tablet (200 mg) by mouth 2 times daily 180 tablet      lisinopril (ZESTRIL) 10 MG tablet TAKE 1 TABLET BY MOUTH EVERY DAY 90 tablet 1     loratadine (CLARITIN) 10 MG tablet Take 10 mg by mouth nightly as needed        metFORMIN (GLUCOPHAGE) 1000 MG tablet TAKE 1 TABLET BY MOUTH TWICE A DAY WITH MEALS 180 tablet 1     Multiple Vitamins-Minerals (MULTIVITAMIN ADULTS PO) Take 1 tablet by mouth daily        tamsulosin (FLOMAX) 0.4 MG 24 hr capsule Take 1 capsule by mouth daily.       torsemide (DEMADEX) 20 MG tablet TAKE 1 TABLET BY MOUTH EVERY DAY 90 tablet 2     Cadexomer Iodine, topical, 0.9% (IODOSORB) 0.9 % GEL gel Apply to bilateral foot wounds as instructed daily (Patient not taking: Reported on 8/11/2021) 10 g 0     diclofenac (VOLTAREN) 1 % topical gel Place onto the skin daily as needed        mupirocin (BACTROBAN) 2 % external ointment Apply topically daily 30 g 2       ALLERGIES     Allergies   Allergen Reactions     Penicillins Anaphylaxis     \"anaphylactic\"     Sulfa Drugs      \"itchy rash, swelling of face and hands\"     Ancef [Cefazolin] Rash       PAST MEDICAL HISTORY:  Past Medical History:   Diagnosis Date     Arthritis     osteoarthritis knees     BPH      CAD (coronary artery disease)     subtotal occlusion in the small distal LAD      Cardiomyopathy      Cerebral artery occlusion with cerebral infarction     TIA 1993, no residual     Chronic kidney disease      Chronic rhinitis      HTN (hypertension)      Hyperlipidemia      Kidney stone      PVD (peripheral vascular disease)      Sleep apnea     doesn't use cpap     TIA (transient ischaemic attack) 1993     Type 2 diabetes mellitus - 11/08/2018     Ureteral stone        PAST SURGICAL HISTORY:  Past Surgical History:   Procedure Laterality Date     AMPUTATE TOE(S) Left 10/15/2018    Procedure: AMPUTATE TOE(S);  Left third toe amputation;  Surgeon: Ayaka Azevedo, " DPM, Podiatry/Foot and Ankle Surgery;  Location: [facility] OR     ARTHROPLASTY KNEE Right 2018    Procedure: Right total knee arthroplasty;  Surgeon: Issa Cunha MD;  Location:  OR     COLONOSCOPY  2013    Procedure: COLONOSCOPY;  COLONOSCOPY;  Surgeon: Chau Hogan MD;  Location:  GI     CV HEART CATHETERIZATION WITH POSSIBLE INTERVENTION Left 2019    Procedure: Coronary Angiogram;  Surgeon: Nas Linda MD;  Location:  HEART CARDIAC CATH LAB     Diastasis recti repair  1985     EXTRACAPSULAR CATARACT EXTRATION WITH INTRAOCULAR LENS IMPLANT Left 2017     EXTRACAPSULAR CATARACT EXTRATION WITH INTRAOCULAR LENS IMPLANT Right      FOOT SURGERY  2013    cyst removal      HERNIA REPAIR  2004    ventral      ORIF Shoulder Left      Ventral hernia repair NOS  1987       FAMILY HISTORY:  Family History   Problem Relation Age of Onset     Family History Negative Mother          86 yo     Cancer Father          74 yo brain     Diabetes Maternal Grandfather          89 yo     Alcohol/Drug Paternal Grandfather              Colon Cancer No family hx of        SOCIAL HISTORY:  Social History     Socioeconomic History     Marital status:      Spouse name: None     Number of children: None     Years of education: None     Highest education level: None   Occupational History     Employer: SELF   Tobacco Use     Smoking status: Former Smoker     Packs/day: 0.00     Quit date: 3/17/1993     Years since quittin.5     Smokeless tobacco: Never Used   Substance and Sexual Activity     Alcohol use: Not Currently     Drug use: No     Sexual activity: Never   Other Topics Concern     Parent/sibling w/ CABG, MI or angioplasty before 65F 55M? Not Asked   Social History Narrative     None     Social Determinants of Health     Financial Resource Strain:      Difficulty of Paying Living Expenses:    Food Insecurity:      Worried About Running Out of Food  in the Last Year:      Ran Out of Food in the Last Year:    Transportation Needs:      Lack of Transportation (Medical):      Lack of Transportation (Non-Medical):    Physical Activity:      Days of Exercise per Week:      Minutes of Exercise per Session:    Stress:      Feeling of Stress :    Social Connections:      Frequency of Communication with Friends and Family:      Frequency of Social Gatherings with Friends and Family:      Attends Religion Services:      Active Member of Clubs or Organizations:      Attends Club or Organization Meetings:      Marital Status:    Intimate Partner Violence:      Fear of Current or Ex-Partner:      Emotionally Abused:      Physically Abused:      Sexually Abused:        Review of Systems:  Skin:  Negative       Eyes:  Positive for glasses    ENT:  Positive for hearing loss    Respiratory:  Positive for sleep apnea     Cardiovascular:    edema;Positive for    Gastroenterology: Negative      Genitourinary:  Negative      Musculoskeletal:  Positive for gout    Neurologic:  Negative      Psychiatric:  Negative      Heme/Lymph/Imm:  Negative      Endocrine:  Positive for diabetes      Physical Exam:  Vitals: /70 (BP Location: Right arm, Patient Position: Sitting, Cuff Size: Adult Large)   Pulse 100   Ht 1.829 m (6')   Wt 114 kg (251 lb 6.4 oz)   SpO2 97%   BMI 34.10 kg/m      Constitutional:  cooperative, alert and oriented, well developed, well nourished, in no acute distress obese      Skin:  warm and dry to the touch     scattered excoriations on his LEs from scratching due to dry skin    Head:  normocephalic        Eyes:  pupils equal and round;conjunctivae and lids unremarkable;sclera white        Lymph:No Cervical lymphadenopathy present     ENT:  no pallor or cyanosis        Neck:  carotid pulses are full and equal bilaterally, JVP normal, no carotid bruit   no JVD appreciated but thick neck    Respiratory:  clear to auscultation         Cardiac: regular  rhythm;normal S1 and S2   S4         no significant murmur appreciated   pulses full and equal                                        GI:    obese      Extremities and Muscular Skeletal:  no deformities, clubbing, cyanosis, erythema observed   bilateral LE edema;pitting;1+ (Wearing special shoes for dressing of ulcers on both feet.)          Neurological:  no gross motor deficits;affect appropriate        Psych:  Alert and Oriented x 3        CC  No referring provider defined for this encounter.                  Thank you for allowing me to participate in the care of your patient.      Sincerely,     Lars Martines MD, MD     [facility]  cc:   No referring provider defined for this encounter.

## 2021-09-29 NOTE — PROGRESS NOTES
Service Date: 09/29/2021    HISTORY OF PRESENT ILLNESS:  It was my pleasure to see your patient, Lance Ibrahim.  He is a pleasant 76-year-old patient with a history of resistant hypertension and diastolic congestive heart failure with mild nonobstructive coronary artery disease and who also has non-insulin-dependent diabetes mellitus and moderate obesity with a BMI of 34.  As you know, the patient's symptoms of diastolic heart failure and shortness of breath on exertion markedly improved with management of his blood pressure and diuretic therapy.  Since I last saw him, he is feeling well.  He has no unusual shortness of breath.  No ankle edema, no orthopnea or PND.  His blood pressure is very well controlled today at 122/70.  His pulse is 100 beats per minute and regular in rhythm and volume.  This is consistent with sinus rhythm.  He does tend to run a faster heart rate.  His last lipid profile was drawn 04/29/2021 which was very good with an LDL of 48, HDL of 53.  His triglycerides were borderline at 153 and his total cholesterol was 132, which is excellent.  Last renal function was checked on 07/29/2021 showing a creatinine of 1.30, BUN of 24 and a GFR of 53, which is similar to what it had been in the past.  His sodium was 137, potassium is 4.5.  He is not complaining of any chest pains or any chest pressure.  He did develop cellulitis and diabetic foot ulcers on both feet.  These are gradually healing.    IMPRESSION:    1.  Diastolic heart failure and mild cardiomyopathy.  The patient appears to be euvolemic and his symptoms have significantly improved with improved blood pressure and diuresis with torsemide.  2.  Essential hypertension.  His blood pressure is well controlled on his present medications.  3.  Type 2 diabetes mellitus with recent lower extremity cellulitis associated with new shoes that did not fit correctly.  4.  Cardiomyopathy.  His most recent echocardiogram showed that his ejection fraction had  returned to the low normal range with an EF in the 50%-55% range (his initial echocardiogram on presentation in 2018 showed an EF of 45%-50%).    PLAN:  We will continue the patient on his present medications.  I will see him back again in 1 year's time.  At that stage, I will repeat his echocardiogram.  We will also check a basic metabolic profile and lipid profile at that stage also.  As always, however, the patient has been told to contact me if he has any questions or any concerns.    Lars Martines MD    cc:  Anh Spivey, CNP [facility] [address] [place]  [address]      Lars Lai MD, [facility]        D: 2021   T: 2021   MT: [email or web address]    Name:     CURTIS SAHU  MRN:      [id number]        Account:      [id number]   :      1945           Service Date: 2021       Document: [id number]

## 2021-09-30 ENCOUNTER — TELEPHONE (OUTPATIENT)
Dept: CARDIOLOGY | Facility: CLINIC | Age: 76
End: 2021-09-30

## 2021-09-30 DIAGNOSIS — E78.5 HLD (HYPERLIPIDEMIA): ICD-10-CM

## 2021-09-30 DIAGNOSIS — I25.10 CORONARY ARTERY DISEASE INVOLVING NATIVE CORONARY ARTERY OF NATIVE HEART WITHOUT ANGINA PECTORIS: Primary | ICD-10-CM

## 2021-09-30 NOTE — TELEPHONE ENCOUNTER
Reviewed lipids/alt showing   !LIPID/HEPATIC Latest Ref Rng & Units 9/29/2021   CHOL <200 mg/dL 117   TRIGLYCERIDES <150 mg/dL 83   HDL >=40 mg/dL 52   LDL <=100 mg/dL 48   VLDL 0 - 30 mg/dL    NHDL <130 mg/dL 65   CHOLHDLRATIO 0.0 - 5.0    AST 0 - 45 U/L    ALT 0 - 70 U/L 73 (H)   CRP 0.0 - 8.0 mg/L      Will message Dr. Martines to review. Rayna PAULA

## 2021-09-30 NOTE — TELEPHONE ENCOUNTER
Lipids nor ALT were not available to me yesterday. ALT slightly above normal . Recommend repeat ALT in 1 month.thx

## 2021-10-01 NOTE — TELEPHONE ENCOUNTER
Called pt with lipids results & recommendations from Dr. Martines. Order in chart & will message scheduling to call pt to arrange. Rayna PAULA

## 2021-10-05 ENCOUNTER — OFFICE VISIT (OUTPATIENT)
Dept: PODIATRY | Facility: CLINIC | Age: 76
End: 2021-10-05
Payer: COMMERCIAL

## 2021-10-05 VITALS
HEIGHT: 72 IN | WEIGHT: 251 LBS | DIASTOLIC BLOOD PRESSURE: 80 MMHG | SYSTOLIC BLOOD PRESSURE: 126 MMHG | BODY MASS INDEX: 34 KG/M2

## 2021-10-05 DIAGNOSIS — E11.42 DIABETIC POLYNEUROPATHY ASSOCIATED WITH TYPE 2 DIABETES MELLITUS (H): Primary | ICD-10-CM

## 2021-10-05 DIAGNOSIS — M20.5X1 ACQUIRED HALLUX LIMITUS OF BOTH FEET: ICD-10-CM

## 2021-10-05 DIAGNOSIS — I87.2 EDEMA OF BOTH LOWER EXTREMITIES DUE TO PERIPHERAL VENOUS INSUFFICIENCY: ICD-10-CM

## 2021-10-05 DIAGNOSIS — L97.512 ULCER OF RIGHT FOOT, WITH FAT LAYER EXPOSED (H): ICD-10-CM

## 2021-10-05 DIAGNOSIS — M20.5X2 ACQUIRED HALLUX LIMITUS OF BOTH FEET: ICD-10-CM

## 2021-10-05 PROCEDURE — 99213 OFFICE O/P EST LOW 20 MIN: CPT | Mod: 25 | Performed by: PODIATRIST

## 2021-10-05 PROCEDURE — 11042 DBRDMT SUBQ TIS 1ST 20SQCM/<: CPT | Performed by: PODIATRIST

## 2021-10-05 ASSESSMENT — MIFFLIN-ST. JEOR: SCORE: 1901.53

## 2021-10-05 NOTE — PATIENT INSTRUCTIONS
Thank you for choosing [facility] / Foot & Ankle Surgery!    DR. RANDHAWA'S CLINIC:  [facility]   [phone]   [place] [address]   [phone]  [phone]      SCHEDULE SURGERY: [phone]   BILLING QUESTIONS: [phone]   APPOINTMENTS: [phone]   CONSUMER PRICE LINE: [phone]      Follow up: 1 month

## 2021-10-05 NOTE — PROGRESS NOTES
Podiatry / Foot and Ankle Surgery Progress Note    October 5, 2021    Subject: Patient was seen for follow up on bilateral foot ulcers.  Notes he has been doing Iodosorb gel to the wounds.  Notes that the left foot ulcer broke open about 2 weeks ago and was bleeding.  The last 5 days it has not had any drainage.  Denies fever, nausea, vomiting.    Objective:  Vitals: /80   Ht 1.829 m (6')   Wt 113.9 kg (251 lb)   BMI 34.04 kg/m      A1C: 8.1 (4/2021)     General:  Patient is alert and orientated.  NAD.     Vascular:  DP and PT pulses are palpable.  significant Edema and varicosities noted.  CFT's < 3secs.  Skin temp is normal      Neuro:  Light and gross touch sensation is absent to feet.      Derm: Right foot-full thickness ulcer to the plantar aspect of the right IPJ.  After debridement this measures 1.7 cm x 0.5 cm x 0.2 cm base of the wound is granular.  No purulent drainage, redness or signs of acute infection noted.  There is heavy clear serous drainage noted from the wound.  All other ulcerations to the right foot are healed at this time.     Left foot-all ulcerations to the left foot are healed there are no open lesions or signs of infection noted.     Musculoskeletal:  Decrease arch height. Lateral deviation of the right and left halluxes.  Significant decreased range of motion of the right first metatarsal phalangeal joint and inner phalangeal joint.     Assessment:     Type 2 diabetes mellitus with peripheral vascular disease (H)  Diabetic polyneuropathy associated with type 2 diabetes mellitus (H)  Chronic ulcer of right great toe with fat layer exposed (H)  Hallux rigidus of both feet  Edema of both lower extremities due to peripheral venous insufficiency     Medical Decision Making/Plan: At this time the wounds were debrided.  Please see procedure note below.  We will have him apply Iodosorb gel to the right foot ulcer as the primary dressing daily and cover with a secondary dressing of a  Band-Aid.    Recommend elevating the feet to help with swelling as he notes he cannot wear his compression socks has difficulty putting them on.  Discussed that the swelling can contribute to the delayed healing of the wounds.  We will order diabetic shoes and inserts at this time as the other wounds have healed.  He will continue in the offloading shoes until he gets the diabetic shoes and inserts and then can transition into the diabetic shoes and inserts.  We will have him follow-up in 4 weeks for reassessment of the ulcerations.  All questions were answered to patient satisfaction he will call further questions or concerns.     Procedure: After verbal consent, excisional debridement was performed on ulcers.  #15 blade was used to debride ulcers down to and including subcutaneous tissue. Bleeding controlled with light pressure.   No drainage noted.  No anesthesia was used due to neuropathy. Dry dressing applied to foot.  Patient tolerated procedure well.      Patient Risk Factor:  Patient is a high risk factor for infection.     Ayaka Azevedo DPM, Podiatry/Foot and Ankle Surgery

## 2021-10-05 NOTE — LETTER
10/5/2021         RE: Lance Ibrahim  [address] [place] [address]        Dear Colleague,    Thank you for referring your patient, Lance Ibrahim, to the [facility]. Please see a copy of my visit note below.    Podiatry / Foot and Ankle Surgery Progress Note    October 5, 2021    Subject: Patient was seen for follow up on bilateral foot ulcers.  Notes he has been doing Iodosorb gel to the wounds.  Notes that the left foot ulcer broke open about 2 weeks ago and was bleeding.  The last 5 days it has not had any drainage.  Denies fever, nausea, vomiting.    Objective:  Vitals: /80   Ht 1.829 m (6')   Wt 113.9 kg (251 lb)   BMI 34.04 kg/m      A1C: 8.1 (4/2021)     General:  Patient is alert and orientated.  NAD.     Vascular:  DP and PT pulses are palpable.  significant Edema and varicosities noted.  CFT's < 3secs.  Skin temp is normal      Neuro:  Light and gross touch sensation is absent to feet.      Derm: Right foot-full thickness ulcer to the plantar aspect of the right IPJ.  After debridement this measures 1.7 cm x 0.5 cm x 0.2 cm base of the wound is granular.  No purulent drainage, redness or signs of acute infection noted.  There is heavy clear serous drainage noted from the wound.  All other ulcerations to the right foot are healed at this time.     Left foot-all ulcerations to the left foot are healed there are no open lesions or signs of infection noted.     Musculoskeletal:  Decrease arch height. Lateral deviation of the right and left halluxes.  Significant decreased range of motion of the right first metatarsal phalangeal joint and inner phalangeal joint.     Assessment:     Type 2 diabetes mellitus with peripheral vascular disease (H)  Diabetic polyneuropathy associated with type 2 diabetes mellitus (H)  Chronic ulcer of right great toe with fat layer exposed (H)  Hallux rigidus of both feet  Edema of both lower extremities due to peripheral  venous insufficiency     Medical Decision Making/Plan: At this time the wounds were debrided.  Please see procedure note below.  We will have him apply Iodosorb gel to the right foot ulcer as the primary dressing daily and cover with a secondary dressing of a Band-Aid.    Recommend elevating the feet to help with swelling as he notes he cannot wear his compression socks has difficulty putting them on.  Discussed that the swelling can contribute to the delayed healing of the wounds.  We will order diabetic shoes and inserts at this time as the other wounds have healed.  He will continue in the offloading shoes until he gets the diabetic shoes and inserts and then can transition into the diabetic shoes and inserts.  We will have him follow-up in 4 weeks for reassessment of the ulcerations.  All questions were answered to patient satisfaction he will call further questions or concerns.     Procedure: After verbal consent, excisional debridement was performed on ulcers.  #15 blade was used to debride ulcers down to and including subcutaneous tissue. Bleeding controlled with light pressure.   No drainage noted.  No anesthesia was used due to neuropathy. Dry dressing applied to foot.  Patient tolerated procedure well.      Patient Risk Factor:  Patient is a high risk factor for infection.     Ayaka Azevedo DPM, Podiatry/Foot and Ankle Surgery          Again, thank you for allowing me to participate in the care of your patient.        Sincerely,        Ayaka Azevedo DPM, Podiatry/Foot and Ankle Surgery

## 2021-10-07 DIAGNOSIS — I10 ESSENTIAL HYPERTENSION: ICD-10-CM

## 2021-10-07 RX ORDER — ATORVASTATIN CALCIUM 40 MG/1
40 TABLET, FILM COATED ORAL DAILY
Qty: 90 TABLET | Refills: 3 | Status: SHIPPED | OUTPATIENT
Start: 2021-10-07 | End: 2022-08-25

## 2021-10-18 ENCOUNTER — TRANSFERRED RECORDS (OUTPATIENT)
Dept: HEALTH INFORMATION MANAGEMENT | Facility: CLINIC | Age: 76
End: 2021-10-18
Payer: MEDICARE

## 2021-10-25 DIAGNOSIS — I42.9 CARDIOMYOPATHY, UNSPECIFIED TYPE (H): ICD-10-CM

## 2021-10-25 DIAGNOSIS — R07.9 CHEST PAIN, UNSPECIFIED TYPE: ICD-10-CM

## 2021-10-25 DIAGNOSIS — R06.09 DYSPNEA ON EXERTION: ICD-10-CM

## 2021-10-25 RX ORDER — ISOSORBIDE MONONITRATE 30 MG/1
30 TABLET, EXTENDED RELEASE ORAL DAILY
Qty: 90 TABLET | Refills: 3 | Status: SHIPPED | OUTPATIENT
Start: 2021-10-25 | End: 2022-01-26

## 2021-11-01 ENCOUNTER — LAB (OUTPATIENT)
Dept: LAB | Facility: CLINIC | Age: 76
End: 2021-11-01
Payer: COMMERCIAL

## 2021-11-01 DIAGNOSIS — E78.5 HLD (HYPERLIPIDEMIA): ICD-10-CM

## 2021-11-01 DIAGNOSIS — E11.9 TYPE 2 DIABETES, HBA1C GOAL < 7% (H): ICD-10-CM

## 2021-11-01 DIAGNOSIS — I25.10 CORONARY ARTERY DISEASE INVOLVING NATIVE CORONARY ARTERY OF NATIVE HEART WITHOUT ANGINA PECTORIS: ICD-10-CM

## 2021-11-01 LAB
ALT SERPL W P-5'-P-CCNC: 59 U/L (ref 0–70)
CREAT UR-MCNC: 18 MG/DL
ERYTHROCYTE [DISTWIDTH] IN BLOOD BY AUTOMATED COUNT: 15.5 % (ref 10–15)
HBA1C MFR BLD: 7.5 % (ref 0–5.6)
HCT VFR BLD AUTO: 40.5 % (ref 40–53)
HGB BLD-MCNC: 12.4 G/DL (ref 13.3–17.7)
MCH RBC QN AUTO: 29.2 PG (ref 26.5–33)
MCHC RBC AUTO-ENTMCNC: 30.6 G/DL (ref 31.5–36.5)
MCV RBC AUTO: 95 FL (ref 78–100)
MICROALBUMIN UR-MCNC: 6 MG/L
MICROALBUMIN/CREAT UR: 33.33 MG/G CR (ref 0–17)
PLATELET # BLD AUTO: 256 10E3/UL (ref 150–450)
RBC # BLD AUTO: 4.25 10E6/UL (ref 4.4–5.9)
WBC # BLD AUTO: 7.7 10E3/UL (ref 4–11)

## 2021-11-01 PROCEDURE — 85027 COMPLETE CBC AUTOMATED: CPT | Performed by: INTERNAL MEDICINE

## 2021-11-01 PROCEDURE — 83036 HEMOGLOBIN GLYCOSYLATED A1C: CPT | Performed by: INTERNAL MEDICINE

## 2021-11-01 PROCEDURE — 36415 COLL VENOUS BLD VENIPUNCTURE: CPT | Performed by: INTERNAL MEDICINE

## 2021-11-01 PROCEDURE — 84460 ALANINE AMINO (ALT) (SGPT): CPT | Performed by: INTERNAL MEDICINE

## 2021-11-01 PROCEDURE — 82043 UR ALBUMIN QUANTITATIVE: CPT

## 2021-11-10 ENCOUNTER — OFFICE VISIT (OUTPATIENT)
Dept: PODIATRY | Facility: CLINIC | Age: 76
End: 2021-11-10
Payer: COMMERCIAL

## 2021-11-10 VITALS
SYSTOLIC BLOOD PRESSURE: 112 MMHG | DIASTOLIC BLOOD PRESSURE: 72 MMHG | BODY MASS INDEX: 34 KG/M2 | WEIGHT: 251 LBS | HEIGHT: 72 IN

## 2021-11-10 DIAGNOSIS — L97.512 CHRONIC ULCER OF RIGHT GREAT TOE WITH FAT LAYER EXPOSED (H): ICD-10-CM

## 2021-11-10 DIAGNOSIS — E11.42 DIABETIC POLYNEUROPATHY ASSOCIATED WITH TYPE 2 DIABETES MELLITUS (H): Primary | ICD-10-CM

## 2021-11-10 DIAGNOSIS — M20.22 HALLUX RIGIDUS OF BOTH FEET: ICD-10-CM

## 2021-11-10 DIAGNOSIS — I87.2 EDEMA OF BOTH LOWER EXTREMITIES DUE TO PERIPHERAL VENOUS INSUFFICIENCY: ICD-10-CM

## 2021-11-10 DIAGNOSIS — M20.21 HALLUX RIGIDUS OF BOTH FEET: ICD-10-CM

## 2021-11-10 PROCEDURE — 11042 DBRDMT SUBQ TIS 1ST 20SQCM/<: CPT | Performed by: PODIATRIST

## 2021-11-10 PROCEDURE — 99213 OFFICE O/P EST LOW 20 MIN: CPT | Mod: 25 | Performed by: PODIATRIST

## 2021-11-10 ASSESSMENT — MIFFLIN-ST. JEOR: SCORE: 1901.53

## 2021-11-10 NOTE — PROGRESS NOTES
Podiatry / Foot and Ankle Surgery Progress Note    November 10, 2021    Subject: Patient was seen for right foot ulcer..  Has been doing Iodosorb dressing.  Denies fever, nausea, vomiting.  Wants to know why we just cannot cut out the ulcer.  No pain but has baseline neuropathy.    Objective:  Vitals: /72   Ht 1.829 m (6')   Wt 113.9 kg (251 lb)   BMI 34.04 kg/m    BMI= Body mass index is 34.04 kg/m .    A1C: 8.1 (4/2021)     General:  Patient is alert and orientated.  NAD.     Vascular:  DP and PT pulses are palpable.  significant Edema and varicosities noted.  CFT's < 3secs.  Skin temp is normal      Neuro:  Light and gross touch sensation is absent to feet.      Derm: Right foot-full thickness ulcer to the plantar aspect of the right IPJ.  After debridement this measures 1.7 cm x 0.5 cm x 0.2 cm base of the wound is granular (same as last visit).  No purulent drainage, redness or signs of acute infection noted.  There is heavy clear serous drainage noted from the wound.  All other ulcerations to the right foot are healed at this time.     Left foot-all ulcerations to the left foot are healed there are no open lesions or signs of infection noted.     Musculoskeletal:  Decrease arch height. Lateral deviation of the right and left halluxes.  Significant decreased range of motion of the right first metatarsal phalangeal joint and inner phalangeal joint.     Assessment:     Type 2 diabetes mellitus with peripheral vascular disease (H)  Diabetic polyneuropathy associated with type 2 diabetes mellitus (H)  Chronic ulcer of right great toe with fat layer exposed (H)  Hallux rigidus of both feet  Edema of both lower extremities due to peripheral venous insufficiency     Medical Decision Making/Plan: At this time the wounds were debrided.  Please see procedure note below.  We will have him continue to apply Iodosorb gel to the right foot ulcer as the primary dressing daily and cover with a secondary dressing of a  Band-Aid.    Recommend elevating the feet to help with swelling as he notes he cannot wear his compression socks has difficulty putting them on.  Discussed that the swelling can contribute to the delayed healing of the wounds.  We will regive him the number for the orthotics place as he notes he did not get a call them to schedule. He will continue in the offloading shoes until he gets the diabetic shoes and inserts and then can transition into the diabetic shoes and inserts.  He asked why we can just cut of the ulcer and I explained that because of his rigid toe deformity that we need to remove the bone underneath and possibly do an Achilles tendon lengthening or the wound is.  Discussed that he will continue minimal heel weightbearing in a boot for 4-6 and he notes that he cannot do that at this time. We will have him follow-up in 4 weeks for reassessment of the ulcerations.  All questions were answered to patient satisfaction he will call further questions or concerns.     Procedure: After verbal consent, excisional debridement was performed on ulcers.  #15 blade was used to debride ulcers down to and including subcutaneous tissue. Bleeding controlled with light pressure.   No drainage noted.  No anesthesia was used due to neuropathy. Dry dressing applied to foot.  Patient tolerated procedure well.      Patient Risk Factor:  Patient is a high risk factor for infection.     Ayaka Azevedo DPM, Podiatry/Foot and Ankle Surgery

## 2021-11-10 NOTE — LETTER
11/10/2021         RE: Lance Ibrahim  [address] [place] [address]        Dear Colleague,    Thank you for referring your patient, Lance Ibrahim, to the [facility] PODIATRY. Please see a copy of my visit note below.    Podiatry / Foot and Ankle Surgery Progress Note    November 10, 2021    Subject: Patient was seen for right foot ulcer..  Has been doing Iodosorb dressing.  Denies fever, nausea, vomiting.  Wants to know why we just cannot cut out the ulcer.  No pain but has baseline neuropathy.    Objective:  Vitals: /72   Ht 1.829 m (6')   Wt 113.9 kg (251 lb)   BMI 34.04 kg/m    BMI= Body mass index is 34.04 kg/m .    A1C: 8.1 (4/2021)     General:  Patient is alert and orientated.  NAD.     Vascular:  DP and PT pulses are palpable.  significant Edema and varicosities noted.  CFT's < 3secs.  Skin temp is normal      Neuro:  Light and gross touch sensation is absent to feet.      Derm: Right foot-full thickness ulcer to the plantar aspect of the right IPJ.  After debridement this measures 1.7 cm x 0.5 cm x 0.2 cm base of the wound is granular (same as last visit).  No purulent drainage, redness or signs of acute infection noted.  There is heavy clear serous drainage noted from the wound.  All other ulcerations to the right foot are healed at this time.     Left foot-all ulcerations to the left foot are healed there are no open lesions or signs of infection noted.     Musculoskeletal:  Decrease arch height. Lateral deviation of the right and left halluxes.  Significant decreased range of motion of the right first metatarsal phalangeal joint and inner phalangeal joint.     Assessment:     Type 2 diabetes mellitus with peripheral vascular disease (H)  Diabetic polyneuropathy associated with type 2 diabetes mellitus (H)  Chronic ulcer of right great toe with fat layer exposed (H)  Hallux rigidus of both feet  Edema of both lower extremities due to peripheral venous  insufficiency     Medical Decision Making/Plan: At this time the wounds were debrided.  Please see procedure note below.  We will have him continue to apply Iodosorb gel to the right foot ulcer as the primary dressing daily and cover with a secondary dressing of a Band-Aid.    Recommend elevating the feet to help with swelling as he notes he cannot wear his compression socks has difficulty putting them on.  Discussed that the swelling can contribute to the delayed healing of the wounds.  We will regive him the number for the orthotics place as he notes he did not get a call them to schedule. He will continue in the offloading shoes until he gets the diabetic shoes and inserts and then can transition into the diabetic shoes and inserts.  He asked why we can just cut of the ulcer and I explained that because of his rigid toe deformity that we need to remove the bone underneath and possibly do an Achilles tendon lengthening or the wound is.  Discussed that he will continue minimal heel weightbearing in a boot for 4-6 and he notes that he cannot do that at this time. We will have him follow-up in 4 weeks for reassessment of the ulcerations.  All questions were answered to patient satisfaction he will call further questions or concerns.     Procedure: After verbal consent, excisional debridement was performed on ulcers.  #15 blade was used to debride ulcers down to and including subcutaneous tissue. Bleeding controlled with light pressure.   No drainage noted.  No anesthesia was used due to neuropathy. Dry dressing applied to foot.  Patient tolerated procedure well.      Patient Risk Factor:  Patient is a high risk factor for infection.     Ayaka Azevedo DPM, Podiatry/Foot and Ankle Surgery        Again, thank you for allowing me to participate in the care of your patient.        Sincerely,        Ayaka Azevedo DPM, Podiatry/Foot and Ankle Surgery

## 2021-11-10 NOTE — PATIENT INSTRUCTIONS
Thank you for choosing [facility] Podiatry / Foot & Ankle Surgery!    DR. RANDHAWA'S CLINIC:  [facility]   [id number]   [place] [address]   [phone]  [phone]      SCHEDULE SURGERY: [phone]   BILLING QUESTIONS: [phone]   APPOINTMENTS: [phone]   RADIOLOGY: [phone]   CONSUMER PRICE LINE: [phone]      Follow up: 1 month

## 2021-11-11 ENCOUNTER — TELEPHONE (OUTPATIENT)
Dept: INTERNAL MEDICINE | Facility: CLINIC | Age: 76
End: 2021-11-11
Payer: MEDICARE

## 2021-11-11 DIAGNOSIS — E11.9 TYPE 2 DIABETES MELLITUS WITHOUT COMPLICATION, WITHOUT LONG-TERM CURRENT USE OF INSULIN (H): ICD-10-CM

## 2021-11-11 DIAGNOSIS — M10.00 IDIOPATHIC GOUT, UNSPECIFIED CHRONICITY, UNSPECIFIED SITE: Primary | ICD-10-CM

## 2021-11-11 NOTE — TELEPHONE ENCOUNTER
Patient says to fill the colchicine it costs $150.00.    He would like you to write a new RX for an alternative that is not as expensive       Yolanda Paiz

## 2021-11-12 RX ORDER — BLOOD SUGAR DIAGNOSTIC
STRIP MISCELLANEOUS
Qty: 100 STRIP | Refills: 3 | Status: SHIPPED | OUTPATIENT
Start: 2021-11-12 | End: 2022-06-16 | Stop reason: ALTCHOICE

## 2021-11-12 NOTE — TELEPHONE ENCOUNTER
Prescription approved per [facility] Refill Protocol.      Patient checking his sugars once daily.  Has enough lancets.            Called patient and he stated he had lab work completed on 11/1/21.

## 2021-11-14 DIAGNOSIS — E11.9 TYPE 2 DIABETES MELLITUS WITHOUT COMPLICATION, WITHOUT LONG-TERM CURRENT USE OF INSULIN (H): ICD-10-CM

## 2021-11-15 DIAGNOSIS — M10.00 IDIOPATHIC GOUT, UNSPECIFIED CHRONICITY, UNSPECIFIED SITE: ICD-10-CM

## 2021-11-15 RX ORDER — ALLOPURINOL 100 MG/1
100 TABLET ORAL DAILY
Qty: 30 TABLET | Refills: 3 | Status: SHIPPED | OUTPATIENT
Start: 2021-11-15 | End: 2021-12-09

## 2021-11-16 RX ORDER — COLCHICINE 0.6 MG/1
0.6 CAPSULE ORAL DAILY
Qty: 90 CAPSULE | Refills: 1 | Status: SHIPPED | OUTPATIENT
Start: 2021-11-16 | End: 2021-12-03

## 2021-11-16 NOTE — TELEPHONE ENCOUNTER
Mitigare brand refill request    Last OV on 4/29/21    Uric Acid   Date Value Ref Range Status   11/28/2019 10.8 (H) 3.5 - 7.2 mg/dL Final     Creatinine   Date Value Ref Range Status   09/29/2021 1.18 0.66 - 1.25 mg/dL Final   04/29/2021 1.19 0.66 - 1.25 mg/dL Final     Hemoglobin   Date Value Ref Range Status   11/01/2021 12.4 (L) 13.3 - 17.7 g/dL Final   04/29/2021 12.2 (L) 13.3 - 17.7 g/dL Final   ]

## 2021-11-17 NOTE — TELEPHONE ENCOUNTER
Last OV on 4/29/21   Pt has done lab work recently. No recent OV scheduled.     Provider approval needed.     Lab Results   Component Value Date    A1C 7.5 11/01/2021    A1C 8.1 04/29/2021    A1C 7.1 07/27/2020    A1C 7.7 02/19/2020    A1C 6.3 03/25/2019    A1C 6.4 10/17/2018     Creatinine   Date Value Ref Range Status   09/29/2021 1.18 0.66 - 1.25 mg/dL Final   04/29/2021 1.19 0.66 - 1.25 mg/dL Final

## 2021-12-03 ENCOUNTER — APPOINTMENT (OUTPATIENT)
Dept: CT IMAGING | Facility: CLINIC | Age: 76
DRG: 683 | End: 2021-12-03
Attending: EMERGENCY MEDICINE
Payer: COMMERCIAL

## 2021-12-03 ENCOUNTER — HOSPITAL ENCOUNTER (INPATIENT)
Facility: CLINIC | Age: 76
LOS: 6 days | Discharge: HOME | DRG: 683 | End: 2021-12-09
Attending: EMERGENCY MEDICINE | Admitting: INTERNAL MEDICINE
Payer: COMMERCIAL

## 2021-12-03 ENCOUNTER — HOSPITAL ENCOUNTER (EMERGENCY)
Facility: CLINIC | Age: 76
Discharge: HOME | End: 2021-12-03
Admitting: EMERGENCY MEDICINE
Payer: COMMERCIAL

## 2021-12-03 VITALS
HEART RATE: 91 BPM | DIASTOLIC BLOOD PRESSURE: 56 MMHG | OXYGEN SATURATION: 96 % | RESPIRATION RATE: 20 BRPM | TEMPERATURE: 97.5 F | SYSTOLIC BLOOD PRESSURE: 91 MMHG

## 2021-12-03 DIAGNOSIS — M10.00 IDIOPATHIC GOUT, UNSPECIFIED CHRONICITY, UNSPECIFIED SITE: Primary | ICD-10-CM

## 2021-12-03 DIAGNOSIS — N28.9 ACUTE RENAL INSUFFICIENCY: ICD-10-CM

## 2021-12-03 DIAGNOSIS — M54.6 ACUTE RIGHT-SIDED THORACIC BACK PAIN: ICD-10-CM

## 2021-12-03 DIAGNOSIS — K59.09 OTHER CONSTIPATION: ICD-10-CM

## 2021-12-03 LAB
ALBUMIN UR-MCNC: 30 MG/DL
ANION GAP SERPL CALCULATED.3IONS-SCNC: 9 MMOL/L (ref 3–14)
APPEARANCE UR: ABNORMAL
BACTERIA #/AREA URNS HPF: ABNORMAL /HPF
BASOPHILS # BLD AUTO: 0.1 10E3/UL (ref 0–0.2)
BASOPHILS NFR BLD AUTO: 1 %
BILIRUB UR QL STRIP: NEGATIVE
BUN SERPL-MCNC: 60 MG/DL (ref 7–30)
CALCIUM SERPL-MCNC: 9.6 MG/DL (ref 8.5–10.1)
CHLORIDE BLD-SCNC: 105 MMOL/L (ref 94–109)
CO2 SERPL-SCNC: 23 MMOL/L (ref 20–32)
COLOR UR AUTO: YELLOW
CREAT BLD-MCNC: 3.6 MG/DL (ref 0.5–1.2)
CREAT BLD-MCNC: 3.6 MG/DL (ref 0.7–1.3)
CREAT SERPL-MCNC: 3.38 MG/DL (ref 0.66–1.25)
EOSINOPHIL # BLD AUTO: 0.2 10E3/UL (ref 0–0.7)
EOSINOPHIL NFR BLD AUTO: 2 %
ERYTHROCYTE [DISTWIDTH] IN BLOOD BY AUTOMATED COUNT: 15.8 % (ref 10–15)
GFR SERPL CREATININE-BSD FRML MDRD: 15 ML/MIN/1.73M2
GFR SERPL CREATININE-BSD FRML MDRD: 15 ML/MIN/{1.73_M2}
GFR SERPL CREATININE-BSD FRML MDRD: 17 ML/MIN/1.73M2
GLUCOSE BLD-MCNC: 142 MG/DL (ref 70–99)
GLUCOSE BLDC GLUCOMTR-MCNC: 157 MG/DL (ref 70–99)
GLUCOSE UR STRIP-MCNC: NEGATIVE MG/DL
HCT VFR BLD AUTO: 37.3 % (ref 40–53)
HGB BLD-MCNC: 11.7 G/DL (ref 13.3–17.7)
HGB UR QL STRIP: NEGATIVE
HYALINE CASTS: 3 /LPF
IMM GRANULOCYTES # BLD: 0.6 10E3/UL
IMM GRANULOCYTES NFR BLD: 4 %
KETONES UR STRIP-MCNC: NEGATIVE MG/DL
LEUKOCYTE ESTERASE UR QL STRIP: NEGATIVE
LYMPHOCYTES # BLD AUTO: 2.2 10E3/UL (ref 0.8–5.3)
LYMPHOCYTES NFR BLD AUTO: 15 %
MCH RBC QN AUTO: 29.8 PG (ref 26.5–33)
MCHC RBC AUTO-ENTMCNC: 31.4 G/DL (ref 31.5–36.5)
MCV RBC AUTO: 95 FL (ref 78–100)
MONOCYTES # BLD AUTO: 1.3 10E3/UL (ref 0–1.3)
MONOCYTES NFR BLD AUTO: 9 %
MUCOUS THREADS #/AREA URNS LPF: PRESENT /LPF
NEUTROPHILS # BLD AUTO: 9.9 10E3/UL (ref 1.6–8.3)
NEUTROPHILS NFR BLD AUTO: 69 %
NITRATE UR QL: NEGATIVE
NRBC # BLD AUTO: 0 10E3/UL
NRBC BLD AUTO-RTO: 0 /100
PH UR STRIP: 5 [PH] (ref 5–7)
PLATELET # BLD AUTO: 350 10E3/UL (ref 150–450)
POTASSIUM BLD-SCNC: 4.3 MMOL/L (ref 3.4–5.3)
RBC # BLD AUTO: 3.93 10E6/UL (ref 4.4–5.9)
RBC URINE: 2 /HPF
SARS-COV-2 RNA RESP QL NAA+PROBE: NEGATIVE
SODIUM SERPL-SCNC: 137 MMOL/L (ref 133–144)
SP GR UR STRIP: 1.02 (ref 1–1.03)
SQUAMOUS EPITHELIAL: 2 /HPF
UROBILINOGEN UR STRIP-MCNC: NORMAL MG/DL
WBC # BLD AUTO: 14.3 10E3/UL (ref 4–11)
WBC URINE: 5 /HPF

## 2021-12-03 PROCEDURE — 999N000104 HC STATISTIC NO CHARGE

## 2021-12-03 PROCEDURE — 258N000003 HC RX IP 258 OP 636: Performed by: EMERGENCY MEDICINE

## 2021-12-03 PROCEDURE — 96361 HYDRATE IV INFUSION ADD-ON: CPT

## 2021-12-03 PROCEDURE — 250N000013 HC RX MED GY IP 250 OP 250 PS 637: Performed by: INTERNAL MEDICINE

## 2021-12-03 PROCEDURE — 36415 COLL VENOUS BLD VENIPUNCTURE: CPT | Performed by: EMERGENCY MEDICINE

## 2021-12-03 PROCEDURE — 250N000012 HC RX MED GY IP 250 OP 636 PS 637: Performed by: INTERNAL MEDICINE

## 2021-12-03 PROCEDURE — 250N000011 HC RX IP 250 OP 636: Performed by: EMERGENCY MEDICINE

## 2021-12-03 PROCEDURE — 258N000003 HC RX IP 258 OP 636: Performed by: INTERNAL MEDICINE

## 2021-12-03 PROCEDURE — 120N000006 HC R&B PEDS

## 2021-12-03 PROCEDURE — 72131 CT LUMBAR SPINE W/O DYE: CPT

## 2021-12-03 PROCEDURE — 82310 ASSAY OF CALCIUM: CPT | Performed by: EMERGENCY MEDICINE

## 2021-12-03 PROCEDURE — 74176 CT ABD & PELVIS W/O CONTRAST: CPT

## 2021-12-03 PROCEDURE — 96374 THER/PROPH/DIAG INJ IV PUSH: CPT

## 2021-12-03 PROCEDURE — 85025 COMPLETE CBC W/AUTO DIFF WBC: CPT | Performed by: EMERGENCY MEDICINE

## 2021-12-03 PROCEDURE — 82565 ASSAY OF CREATININE: CPT

## 2021-12-03 PROCEDURE — C9803 HOPD COVID-19 SPEC COLLECT: HCPCS

## 2021-12-03 PROCEDURE — 250N000013 HC RX MED GY IP 250 OP 250 PS 637: Performed by: EMERGENCY MEDICINE

## 2021-12-03 PROCEDURE — 99223 1ST HOSP IP/OBS HIGH 75: CPT | Mod: AI | Performed by: INTERNAL MEDICINE

## 2021-12-03 PROCEDURE — 99285 EMERGENCY DEPT VISIT HI MDM: CPT | Mod: 25

## 2021-12-03 PROCEDURE — 87635 SARS-COV-2 COVID-19 AMP PRB: CPT | Performed by: EMERGENCY MEDICINE

## 2021-12-03 PROCEDURE — 81001 URINALYSIS AUTO W/SCOPE: CPT | Performed by: EMERGENCY MEDICINE

## 2021-12-03 PROCEDURE — 250N000011 HC RX IP 250 OP 636: Performed by: INTERNAL MEDICINE

## 2021-12-03 RX ORDER — LIDOCAINE 40 MG/G
CREAM TOPICAL
Status: DISCONTINUED | OUTPATIENT
Start: 2021-12-03 | End: 2021-12-09 | Stop reason: HOSPADM

## 2021-12-03 RX ORDER — NICOTINE POLACRILEX 4 MG
15-30 LOZENGE BUCCAL
Status: DISCONTINUED | OUTPATIENT
Start: 2021-12-03 | End: 2021-12-09 | Stop reason: HOSPADM

## 2021-12-03 RX ORDER — IBUPROFEN 600 MG/1
600 TABLET, FILM COATED ORAL ONCE
Status: COMPLETED | OUTPATIENT
Start: 2021-12-03 | End: 2021-12-03

## 2021-12-03 RX ORDER — ACETAMINOPHEN 500 MG
1000 TABLET ORAL EVERY 4 HOURS PRN
Status: DISCONTINUED | OUTPATIENT
Start: 2021-12-03 | End: 2021-12-03 | Stop reason: HOSPADM

## 2021-12-03 RX ORDER — SILVER SULFADIAZINE 10 MG/G
CREAM TOPICAL DAILY
COMMUNITY
End: 2022-08-25

## 2021-12-03 RX ORDER — LIDOCAINE 4 G/G
1 PATCH TOPICAL
Status: DISCONTINUED | OUTPATIENT
Start: 2021-12-04 | End: 2021-12-09 | Stop reason: HOSPADM

## 2021-12-03 RX ORDER — ONDANSETRON 4 MG/1
4 TABLET, ORALLY DISINTEGRATING ORAL EVERY 6 HOURS PRN
Status: DISCONTINUED | OUTPATIENT
Start: 2021-12-03 | End: 2021-12-09 | Stop reason: HOSPADM

## 2021-12-03 RX ORDER — ONDANSETRON 2 MG/ML
4 INJECTION INTRAMUSCULAR; INTRAVENOUS EVERY 6 HOURS PRN
Status: DISCONTINUED | OUTPATIENT
Start: 2021-12-03 | End: 2021-12-09 | Stop reason: HOSPADM

## 2021-12-03 RX ORDER — AMOXICILLIN 250 MG
2 CAPSULE ORAL 2 TIMES DAILY PRN
Status: DISCONTINUED | OUTPATIENT
Start: 2021-12-03 | End: 2021-12-09 | Stop reason: HOSPADM

## 2021-12-03 RX ORDER — SODIUM CHLORIDE, SODIUM LACTATE, POTASSIUM CHLORIDE, CALCIUM CHLORIDE 600; 310; 30; 20 MG/100ML; MG/100ML; MG/100ML; MG/100ML
INJECTION, SOLUTION INTRAVENOUS CONTINUOUS
Status: DISCONTINUED | OUTPATIENT
Start: 2021-12-03 | End: 2021-12-07

## 2021-12-03 RX ORDER — HYDRALAZINE HYDROCHLORIDE 20 MG/ML
10 INJECTION INTRAMUSCULAR; INTRAVENOUS EVERY 4 HOURS PRN
Status: DISCONTINUED | OUTPATIENT
Start: 2021-12-03 | End: 2021-12-09 | Stop reason: HOSPADM

## 2021-12-03 RX ORDER — POLYETHYLENE GLYCOL 3350 17 G/17G
17 POWDER, FOR SOLUTION ORAL DAILY PRN
Status: DISCONTINUED | OUTPATIENT
Start: 2021-12-03 | End: 2021-12-09 | Stop reason: HOSPADM

## 2021-12-03 RX ORDER — AMOXICILLIN 250 MG
1 CAPSULE ORAL 2 TIMES DAILY PRN
Status: DISCONTINUED | OUTPATIENT
Start: 2021-12-03 | End: 2021-12-09 | Stop reason: HOSPADM

## 2021-12-03 RX ORDER — DEXTROSE MONOHYDRATE 25 G/50ML
25-50 INJECTION, SOLUTION INTRAVENOUS
Status: DISCONTINUED | OUTPATIENT
Start: 2021-12-03 | End: 2021-12-09 | Stop reason: HOSPADM

## 2021-12-03 RX ORDER — HEPARIN SODIUM 5000 [USP'U]/.5ML
5000 INJECTION, SOLUTION INTRAVENOUS; SUBCUTANEOUS EVERY 12 HOURS
Status: DISCONTINUED | OUTPATIENT
Start: 2021-12-03 | End: 2021-12-09 | Stop reason: HOSPADM

## 2021-12-03 RX ORDER — LORAZEPAM 0.5 MG/1
0.25 TABLET ORAL EVERY 4 HOURS PRN
Status: DISCONTINUED | OUTPATIENT
Start: 2021-12-03 | End: 2021-12-06

## 2021-12-03 RX ORDER — HYDROMORPHONE HYDROCHLORIDE 1 MG/ML
0.5 INJECTION, SOLUTION INTRAMUSCULAR; INTRAVENOUS; SUBCUTANEOUS
Status: DISCONTINUED | OUTPATIENT
Start: 2021-12-03 | End: 2021-12-03

## 2021-12-03 RX ORDER — ACETAMINOPHEN 325 MG/1
975 TABLET ORAL EVERY 8 HOURS
Status: COMPLETED | OUTPATIENT
Start: 2021-12-03 | End: 2021-12-05

## 2021-12-03 RX ADMIN — HYDROMORPHONE HYDROCHLORIDE 0.5 MG: 1 INJECTION, SOLUTION INTRAMUSCULAR; INTRAVENOUS; SUBCUTANEOUS at 14:14

## 2021-12-03 RX ADMIN — HEPARIN SODIUM 5000 UNITS: 5000 INJECTION INTRAVENOUS; SUBCUTANEOUS at 20:51

## 2021-12-03 RX ADMIN — INSULIN ASPART 1 UNITS: 100 INJECTION, SOLUTION INTRAVENOUS; SUBCUTANEOUS at 20:45

## 2021-12-03 RX ADMIN — Medication 1 MG: at 20:46

## 2021-12-03 RX ADMIN — ACETAMINOPHEN 975 MG: 325 TABLET, FILM COATED ORAL at 20:46

## 2021-12-03 RX ADMIN — IBUPROFEN 600 MG: 600 TABLET ORAL at 11:30

## 2021-12-03 RX ADMIN — OXYCODONE HYDROCHLORIDE 2.5 MG: 5 TABLET ORAL at 20:46

## 2021-12-03 RX ADMIN — SODIUM CHLORIDE, POTASSIUM CHLORIDE, SODIUM LACTATE AND CALCIUM CHLORIDE: 600; 310; 30; 20 INJECTION, SOLUTION INTRAVENOUS at 20:43

## 2021-12-03 RX ADMIN — SODIUM CHLORIDE 1000 ML: 9 INJECTION, SOLUTION INTRAVENOUS at 15:12

## 2021-12-03 RX ADMIN — CYCLOBENZAPRINE HYDROCHLORIDE 7.5 MG: 5 TABLET, FILM COATED ORAL at 21:23

## 2021-12-03 RX ADMIN — ACETAMINOPHEN 1000 MG: 500 TABLET, FILM COATED ORAL at 11:30

## 2021-12-03 ASSESSMENT — ACTIVITIES OF DAILY LIVING (ADL)
ADLS_ACUITY_SCORE: 12
ADLS_ACUITY_SCORE: 12
ADLS_ACUITY_SCORE: 8
ADLS_ACUITY_SCORE: 12
ADLS_ACUITY_SCORE: 6
ADLS_ACUITY_SCORE: 12
ADLS_ACUITY_SCORE: 12

## 2021-12-03 ASSESSMENT — ENCOUNTER SYMPTOMS
BACK PAIN: 1
SHORTNESS OF BREATH: 0
FEVER: 0
VOMITING: 0

## 2021-12-03 ASSESSMENT — MIFFLIN-ST. JEOR: SCORE: 1819.43

## 2021-12-03 NOTE — H&P
[facility]  History and Physical  Hospitalist - Jacob Arriaza DO       Date of Admission:  12/3/2021    Chief Complaint   Back pain    History is obtained from the patient, the emergency department physician, as well as the electronic medical record.    History of Present Illness   Lance Ibrahim is a 76 year old male with past medical history of hypertension, history of CVA in 1993, coronary artery disease, hyperlipidemia, obstructive sleep apnea, type 2 diabetes mellitus, chronic kidney disease stage IIIa who presented on 12/3/2021 with chief complaint of back pain.  The patient states that he has chronic back pain that he deals with.  Over the last week he has had worsening of his back pain.  He describes it as constant and located in the right side of his lower thoracic.  He states that any movement exacerbates the pain.  He states it radiates up his back across his shoulder blades, and up to his neck.  He does report that his wife is currently admitted at [facility] and is comfort care.  He has been having a difficult time dealing with this at home.  Hence, he has not been eating or drinking very well.  He does state that he has been taking 3 Aleve PM at night for his pain and to help him sleep.  He also reports taking his torsemide and antihypertensives.  He denies any difficulty urinating, but notes that it has been much less frequent than normal.    In the emergency department patient was found to have a temperature of 97.1  F, heart rate 89, blood pressure 102/56, respiratory rate 18, SPO2 100% on room air.  Lab work revealed a creatinine of 3.6 with a baseline of 1.1, leukocytosis of 14.3, hemoglobin 11.7.  The patient had a CT lumbar spine that showed no evidence of acute fracture, degenerative changes in the lumbar spine.  The patient also had a CT abdomen and pelvis showing no acute findings, hepatic steatosis, stable left adrenal benign adenoma, colonic  diverticulosis.    ASSESSMENT/PLAN    Dehydration  Acute Kidney Injury  Chronic Kidney Disease Stage 3a  - Baseline Cr = 1.1-1.3  - IVF  - Avoid Nephrotoxins  - Encouraged PO intake  - Daily BMP    Acute on Chronic Low Back Pain  Degenerative Disc Disease  - CT lumbar spine showed degenerative changes most pronounced at L5-S1 with severe right and moderate left neural foraminal narrowing  - Consult PT/OT  - Pain control    Hypertension  - Hold torsemide and lisinopril secondary to DAEN  - Resume home antihypertensives as appropriate    Diabetes Mellitus Type 2  - A1C = 7.5 on 11/1/2021  - ISS  - Hold PTA metformin secondary to DEAN    BPH  - Post voids  - Resume home tamsulosin and finasteride    Chronic Medical Problems:  Hx of CVA in 1993  Gout  Hyperlipidemia  Coronary Artery Disease    PLAN: Resume home medications as appropriate once confirmed by pharmacy. Bed coordinator contacted to attempt to place pt in same room as spouse, who is currently comfort care.  Pt is agreeable to share a room with his wife.    DVT Prophylaxis: Heparin SQ  Code Status: Full Code  Discharge Plan: Expected discharge: 2-3 days recommended to prior living arrangement once renal function improved.      Jacob Arriaza DO    Primary Care Physician   Anh Spivey    -----------------------------------------------------------------------------------------------------------------------------------------------------------------------------------------------------    Past Medical History    I have reviewed this patient's medical history and updated it with pertinent information if needed.   Past Medical History:   Diagnosis Date     Arthritis     osteoarthritis knees     BPH      CAD (coronary artery disease)     subtotal occlusion in the small distal LAD      Cardiomyopathy      Cerebral artery occlusion with cerebral infarction     TIA 1993, no residual     Chronic kidney disease      Chronic rhinitis      HTN (hypertension)       Hyperlipidemia      Kidney stone      PVD (peripheral vascular disease)      Sleep apnea     doesn't use cpap     TIA (transient ischaemic attack) 1993     Type 2 diabetes mellitus - 11/08/2018     Ureteral stone        Past Surgical History   I have reviewed this patient's surgical history and updated it with pertinent information if needed.  Past Surgical History:   Procedure Laterality Date     AMPUTATE TOE(S) Left 10/15/2018    Procedure: AMPUTATE TOE(S);  Left third toe amputation;  Surgeon: Ayaka Azevedo DPM, Podiatry/Foot and Ankle Surgery;  Location: [facility] OR     ARTHROPLASTY KNEE Right 12/07/2018    Procedure: Right total knee arthroplasty;  Surgeon: Issa Cunha MD;  Location:  OR     COLONOSCOPY  03/09/2013    Procedure: COLONOSCOPY;  COLONOSCOPY;  Surgeon: Chau Hogan MD;  Location:  GI     CV HEART CATHETERIZATION WITH POSSIBLE INTERVENTION Left 03/05/2019    Procedure: Coronary Angiogram;  Surgeon: Nas Linda MD;  Location:  HEART CARDIAC CATH LAB     Diastasis recti repair  1985     EXTRACAPSULAR CATARACT EXTRATION WITH INTRAOCULAR LENS IMPLANT Left 03/13/2017     EXTRACAPSULAR CATARACT EXTRATION WITH INTRAOCULAR LENS IMPLANT Right      FOOT SURGERY  04/2013    cyst removal      HERNIA REPAIR  07/13/2004    ventral      ORIF Shoulder Left      Ventral hernia repair NOS  1987       Prior to Admission Medications   Prior to Admission Medications   Prescriptions Last Dose Informant Patient Reported? Taking?   Cadexomer Iodine, topical, 0.9% (IODOSORB) 0.9 % GEL gel   No No   Sig: Apply to bilateral foot wounds as instructed daily   Patient not taking: Reported on 8/11/2021   Ferrous Gluconate 240 (27 Fe) MG TABS   Yes No   Sig: Take 1 tablet by mouth daily    MITIGARE 0.6 MG capsule   No No   Sig: Take 1 capsule (0.6 mg) by mouth daily   Multiple Vitamins-Minerals (MULTIVITAMIN ADULTS PO)   Yes No   Sig: Take 1 tablet by mouth daily    ONETOUCH ULTRA test strip   No No   Sig:  "TEST DAILY OR AS DIRECTED   allopurinol (ZYLOPRIM) 100 MG tablet   No No   Sig: Take 1 tablet (100 mg) by mouth daily   amLODIPine (NORVASC) 5 MG tablet   No No   Sig: TAKE 1 TABLET BY MOUTH EVERY DAY   aspirin (ASA) 81 MG EC tablet   No No   Sig: Take 1 tablet (81 mg) by mouth daily   atorvastatin (LIPITOR) 40 MG tablet   No No   Sig: Take 1 tablet (40 mg) by mouth daily   colchicine (MITIGARE) 0.6 MG capsule   No No   Sig: TAKE 1 CAPSULE (0.6 MG) BY MOUTH 2 TIMES DAILY   finasteride (PROSCAR) 5 MG tablet   Yes No   Sig: Take 5 mg by mouth daily.   fluticasone (FLONASE) 50 MCG/ACT spray   Yes No   Sig: Spray 1 spray into both nostrils nightly as needed    isosorbide mononitrate (IMDUR) 30 MG 24 hr tablet   No No   Sig: Take 1 tablet (30 mg) by mouth daily   labetalol (NORMODYNE) 200 MG tablet   No No   Sig: TAKE 1 TABLET BY MOUTH TWICE A DAY   lisinopril (ZESTRIL) 10 MG tablet   No No   Sig: TAKE 1 TABLET BY MOUTH EVERY DAY   loratadine (CLARITIN) 10 MG tablet   Yes No   Sig: Take 10 mg by mouth nightly as needed    metFORMIN (GLUCOPHAGE) 1000 MG tablet   No No   Sig: TAKE 1 TABLET BY MOUTH TWICE A DAY WITH MEALS   mupirocin (BACTROBAN) 2 % external ointment   No No   Sig: Apply topically daily   tamsulosin (FLOMAX) 0.4 MG 24 hr capsule   Yes No   Sig: Take 1 capsule by mouth daily.   torsemide (DEMADEX) 20 MG tablet   No No   Sig: TAKE 1 TABLET BY MOUTH EVERY DAY      Facility-Administered Medications: None     Allergies   Allergies   Allergen Reactions     Penicillins Anaphylaxis     \"anaphylactic\"     Sulfa Drugs      \"itchy rash, swelling of face and hands\"     Ancef [Cefazolin] Rash       Social History   I have reviewed this patient's social history and updated it with pertinent information if needed. Lance Ibrahim  reports that he quit smoking about 28 years ago. He smoked 0.00 packs per day. He has never used smokeless tobacco. He reports previous alcohol use. He reports that he does not use drugs.    Family " History   I have reviewed this patient's family history and updated it with pertinent information if needed.   Family History   Problem Relation Age of Onset     Family History Negative Mother          88 yo     Cancer Father          76 yo brain     Diabetes Maternal Grandfather          91 yo     Alcohol/Drug Paternal Grandfather              Colon Cancer No family hx of        -----------------------------------------------------------------------------------------------------------------------------------------------------------------------------------------------------    Review of Systems   The 10 point Review of Systems is negative other than noted in the HPI or here.     Physical Exam   Temp: 97.1  F (36.2  C) Temp src: Temporal BP: 109/53 Pulse: 73   Resp: 18 SpO2: 94 % O2 Device: None (Room air)    Vital Signs with Ranges  Temp:  [97.1  F (36.2  C)-97.5  F (36.4  C)] 97.1  F (36.2  C)  Pulse:  [66-91] 73  Resp:  [18-20] 18  BP: ()/(53-63) 109/53  SpO2:  [94 %-100 %] 94 %  0 lbs 0 oz    Constitutional: Awake, alert, cooperative, no apparent distress.  Eyes: Conjunctiva and pupils examined and normal.  HEENT: Moist mucous membranes, normal dentition.  Respiratory: Clear to auscultation bilaterally, no crackles or wheezing.  Cardiovascular: Regular rate and rhythm, normal S1 and S2, and no murmur noted.  GI: Soft, non-distended, non-tender, normal bowel sounds.  Lymph/Hematologic: No anterior cervical or supraclavicular adenopathy.  Skin: No rashes, no cyanosis, no edema.  Musculoskeletal: No joint swelling, erythema or tenderness.  Neurologic: Cranial nerves 2-12 intact, normal strength and sensation.  Psychiatric: Alert, oriented to person, place and time, no obvious anxiety or depression.     Data     Recent Labs   Lab 21  1419 21  1414   WBC  --  14.3*   HGB  --  11.7*   MCV  --  95   PLT  --  350   NA  --  137   POTASSIUM  --  4.3   CHLORIDE  --  105    CO2  --  23   BUN  --  60*   CR 3.6* 3.38*   ANIONGAP  --  9   SHANNAN  --  9.6   GLC  --  142*       Recent Results (from the past 24 hour(s))   Lumbar spine CT w/o contrast    Narrative    CT LUMBAR SPINE WITHOUT CONTRAST  12/3/2021 2:46 PM     HISTORY: Back pain.      TECHNIQUE: Axial images of the lumbar spine were obtained without  intravenous contrast. Multiplanar reformations were performed.   Radiation dose for this scan was reduced using automated exposure  control, adjustment of the mA and/or kV according to patient size, or  iterative reconstruction technique.     COMPARISON: None.     FINDINGS:  There are 5 lumbar-type vertebral bodies assumed for the  purposes of this dictation.     Lumbar spine alignment is within normal limits. No loss of vertebral  body height. No evidence of fracture. No destructive bony lesion  appreciated.    Level by level as follows:     T12-L1: No loss of disc height. No significant disc herniation. Normal  facets. No spinal canal or neural foraminal narrowing.     L1-L2: No loss of disc height. No significant disc herniation.  Anterior osteophytic spurring. Normal facets. No spinal canal or  neural foraminal narrowing.     L2-L3: No loss of disc height. No significant disc herniation. Marked  anterior osteophytic spurring. Normal facets. No spinal canal or  neural foraminal narrowing.     L3-L4: Mild loss of disc height. Circumferential disc bulge with mild  endplate osteophytic spurring. Mild facet hypertrophy. No significant  spinal canal narrowing. Mild bilateral neural foraminal narrowing.     L4-L5: Moderate loss of disc height. Circumferential disc bulge with  endplate osteophytic spurring. Mild facet hypertrophy. Mild spinal  canal narrowing. Moderate right neural foraminal narrowing. Moderate  left neural foraminal narrowing.     L5-S1: Marked loss of disc height with degenerative endplate changes.  Circumferential disc bulge with prominent endplate  osteophytic  spurring. Moderate bilateral facet hypertrophy. Mild spinal canal  narrowing with narrowing of the lateral recesses bilaterally. Severe  right neural foraminal narrowing. Moderate left neural foraminal  narrowing.     Visualized paraspinous tissues: Unremarkable.       Impression    IMPRESSION:    1. No evidence of acute fracture or subluxation in the lumbar spine.  2. Degenerative changes in the lower lumbar spine as detailed above,  most pronounced at L5-S1.  3. At L5-S1, there is mild spinal canal narrowing with narrowing of  lateral recesses. Severe right and moderate left neural foraminal  narrowing.  4. At L4-L5, there is mild spinal canal narrowing as well as moderate  bilateral neural foraminal narrowing.     SULMA BENEDICT MD         SYSTEM ID:  [id number]   Abd/pelvis CT no contrast - Stone Protocol    Narrative    CT ABDOMEN PELVIS W/O CONTRAST 12/3/2021 2:47 PM    CLINICAL HISTORY: right flank pain  TECHNIQUE: CT scan of the abdomen and pelvis was performed without IV  contrast. Multiplanar reformats were obtained. Dose reduction  techniques were used.  CONTRAST: None.    COMPARISON: 1/24/2020    FINDINGS:   LOWER CHEST: Normal.    HEPATOBILIARY: Hepatic steatosis. No calcified gallstones or biliary  ductal dilatation.    PANCREAS: Normal.    SPLEEN: Normal.    ADRENAL GLANDS: Stable 1.8 cm left adrenal adenoma.    KIDNEYS/BLADDER: No urinary system calculi, hydroureteronephrosis or  perinephric stranding. Simple cyst at the upper pole of the left  kidney requiring no specific follow-up, stable. Decompressed urinary  bladder.    BOWEL: Sigmoid diverticulosis. No small bowel or colonic obstruction  or inflammatory changes. Normal appendix.    LYMPH NODES: Several small retroperitoneal and bilateral iliac lymph  nodes are stable. For example a right common iliac 8 mm lymph node  (series 8, image 25), left common iliac and millimeter lymph node  (series 8, image 14) and bilateral distal external  iliac prominent  lymph nodes are unchanged. No new or enlarging lymph nodes.    VASCULATURE: No abdominal aortic aneurysm.    PELVIC ORGANS: Mild intraprostatic calcifications. Stable  fat-containing moderate-sized right inguinal hernia.    OTHER: No free fluid or fluid collections. Ventral abdominal mesh. No  free air.    MUSCULOSKELETAL: Mild degenerative changes in the spine. No suspicious  lesions in the bones.      Impression    IMPRESSION:   1.  No acute findings in the abdomen and pelvis. No urinary system  calculi.  2.  Hepatic steatosis.  3.  Stable left adrenal benign adenoma.  4.  Colonic diverticulosis.  5.  Stable fat-containing right inguinal hernia.    KENDRA LOPEZ MD         SYSTEM ID:  [id number]

## 2021-12-03 NOTE — PHARMACY-ADMISSION MEDICATION HISTORY
Admission medication history interview status for this patient is complete. See [facility] admission navigator for allergy information, prior to admission medications and immunization status.     Medication history interview done, indicate source(s): Patient  Medication history resources (including written lists, pill bottles, clinic record):None  Pharmacy: -    Changes made to PTA medication list:  Added: - silvadene  Changed: claritin, flonase to q hs,   Reported as Not Taking: -  Removed: bactroban, mitigare, iodosorb, cichicine    Actions taken by pharmacist (provider contacted, etc):None     Additional medication history information:None    Medication reconciliation/reorder completed by provider prior to medication history?  no   (Y/N)     For patients on insulin therapy:   Do you use sliding scale insulin based on blood sugars?   What is your pre-meal insulin coverage?    Do you typically eat three meals a day?   How many times do you check your blood glucose per day?   How many episodes of hypoglycemia do you typically have per month?   Do you have a Continuous Glucose Monitor (CGM)?      Prior to Admission medications    Medication Sig Last Dose Taking? Auth Provider   allopurinol (ZYLOPRIM) 100 MG tablet Take 1 tablet (100 mg) by mouth daily 12/3/2021 at Unknown time Yes Anh Spivey NP   amLODIPine (NORVASC) 5 MG tablet TAKE 1 TABLET BY MOUTH EVERY DAY 12/3/2021 at Unknown time Yes Anh Spivey NP   aspirin (ASA) 81 MG EC tablet Take 1 tablet (81 mg) by mouth daily 12/3/2021 at Unknown time Yes Nas Linda MD   atorvastatin (LIPITOR) 40 MG tablet Take 1 tablet (40 mg) by mouth daily 12/3/2021 at Unknown time Yes Lars Martines MD   Ferrous Gluconate 240 (27 Fe) MG TABS Take 1 tablet by mouth daily  12/3/2021 at Unknown time Yes Reported, Patient   finasteride (PROSCAR) 5 MG tablet Take 5 mg by mouth daily. 12/3/2021 at Unknown time Yes Reported, Patient   fluticasone  (FLONASE) 50 MCG/ACT spray Spray 1 spray into both nostrils At Bedtime  12/2/2021 at Unknown time Yes Unknown, Entered By History   isosorbide mononitrate (IMDUR) 30 MG 24 hr tablet Take 1 tablet (30 mg) by mouth daily 12/3/2021 at Unknown time Yes Lars Martines MD   labetalol (NORMODYNE) 200 MG tablet TAKE 1 TABLET BY MOUTH TWICE A DAY 12/3/2021 at x1 Yes Anh Spivey NP   lisinopril (ZESTRIL) 10 MG tablet TAKE 1 TABLET BY MOUTH EVERY DAY 12/3/2021 at Unknown time Yes Anh Spivey NP   loratadine (CLARITIN) 10 MG tablet Take 10 mg by mouth At Bedtime  12/2/2021 at Unknown time Yes Reported, Patient   metFORMIN (GLUCOPHAGE) 1000 MG tablet TAKE 1 TABLET BY MOUTH TWICE A DAY WITH MEALS 12/3/2021 at x1 Yes Anh Spivey NP   Multiple Vitamins-Minerals (MULTIVITAMIN ADULTS PO) Take 1 tablet by mouth daily  12/3/2021 at Unknown time Yes Reported, Patient   silver sulfADIAZINE (SILVADENE) 1 % external cream Apply topically daily To bilateral foot wounds 12/3/2021 at Unknown time Yes Unknown, Entered By History   tamsulosin (FLOMAX) 0.4 MG 24 hr capsule Take 1 capsule by mouth daily. 12/3/2021 at Unknown time Yes Reported, Patient   torsemide (DEMADEX) 20 MG tablet TAKE 1 TABLET BY MOUTH EVERY DAY 12/3/2021 at Unknown time Yes Anh Spivey NP   ONETOUCH ULTRA test strip TEST DAILY OR AS DIRECTED   Anh Spivey NP

## 2021-12-03 NOTE — ED TRIAGE NOTES
Pt reports that he has been having ongoing back pain for the past wk. PT reports that he has had this in the past with tx. PT VSS and ABC's intact, pt denies loss of bowel or bladder

## 2021-12-03 NOTE — ED TRIAGE NOTES
Pt presents with complaints of lower back spasms x 1 week. Pt had history of radiofrequency ablation several years ago for similar spasms. Pt has tried prednisone, lidocaine patches, chiropractor & norflex without relief.    Pt declines wanting tylenol or ibuprofen in triage

## 2021-12-03 NOTE — ED PROVIDER NOTES
History     Chief Complaint:  Back Pain      HPI     Lance Ibrahim is a 76 year old male who presents with back pain x1 week.  Patient reports a history of back pain approximately 10 years ago.  He required radiofrequency ablation which resulted in good control.  1 week ago he had the gradual onset of right lower thoracic back pain.  The pain is constant and aggravated by any movement.  He has been taking 3 tablets of Aleve PM at night with mild improvement for the last week.  There is no associated nausea, vomiting, abdominal pain, dysuria, urinary frequency, urinary/stool incontinence, lower extremity numbness, weakness.  He denies history of dialysis, anticoagulant use, malignancy or IV drug use.  Unfortunately patient's wife is upstairs with advanced cancer and was just made comfort care.    Review of Systems   Constitutional: Negative for fever.   Respiratory: Negative for shortness of breath.    Gastrointestinal: Negative for vomiting.   Musculoskeletal: Positive for back pain.   All other systems reviewed and are negative.      Allergies:  Penicillins  Sulfa Drugs  Ancef [Cefazolin]      Medications:    allopurinol (ZYLOPRIM) 100 MG tablet  amLODIPine (NORVASC) 5 MG tablet  aspirin (ASA) 81 MG EC tablet  atorvastatin (LIPITOR) 40 MG tablet  Ferrous Gluconate 240 (27 Fe) MG TABS  finasteride (PROSCAR) 5 MG tablet  fluticasone (FLONASE) 50 MCG/ACT spray  isosorbide mononitrate (IMDUR) 30 MG 24 hr tablet  labetalol (NORMODYNE) 200 MG tablet  lisinopril (ZESTRIL) 10 MG tablet  loratadine (CLARITIN) 10 MG tablet  metFORMIN (GLUCOPHAGE) 1000 MG tablet  Multiple Vitamins-Minerals (MULTIVITAMIN ADULTS PO)  silver sulfADIAZINE (SILVADENE) 1 % external cream  tamsulosin (FLOMAX) 0.4 MG 24 hr capsule  torsemide (DEMADEX) 20 MG tablet  ONETOUCH ULTRA test strip        Past Medical History:    Past Medical History:   Diagnosis Date     Arthritis      BPH      CAD (coronary artery disease)      Cardiomyopathy       Cerebral artery occlusion with cerebral infarction      Chronic kidney disease      Chronic rhinitis      HTN (hypertension)      Hyperlipidemia      Kidney stone      PVD (peripheral vascular disease)      Sleep apnea      TIA (transient ischaemic attack)      Type 2 diabetes mellitus - 2018     Ureteral stone        Past Surgical History:    Past Surgical History:   Procedure Laterality Date     AMPUTATE TOE(S) Left 10/15/2018    Procedure: AMPUTATE TOE(S);  Left third toe amputation;  Surgeon: Ayaka Azevedo DPM, Podiatry/Foot and Ankle Surgery;  Location: [facility] OR     ARTHROPLASTY KNEE Right 2018    Procedure: Right total knee arthroplasty;  Surgeon: Issa Cunha MD;  Location:  OR     COLONOSCOPY  2013    Procedure: COLONOSCOPY;  COLONOSCOPY;  Surgeon: Chau Hogan MD;  Location:  GI     CV HEART CATHETERIZATION WITH POSSIBLE INTERVENTION Left 2019    Procedure: Coronary Angiogram;  Surgeon: Nas Linda MD;  Location:  HEART CARDIAC CATH LAB     Diastasis recti repair  1985     EXTRACAPSULAR CATARACT EXTRATION WITH INTRAOCULAR LENS IMPLANT Left 2017     EXTRACAPSULAR CATARACT EXTRATION WITH INTRAOCULAR LENS IMPLANT Right      FOOT SURGERY  2013    cyst removal      HERNIA REPAIR  2004    ventral      ORIF Shoulder Left      Ventral hernia repair NOS  1987       Family History:    Family History   Problem Relation Age of Onset     Family History Negative Mother          86 yo     Cancer Father          76 yo brain     Diabetes Maternal Grandfather          89 yo     Alcohol/Drug Paternal Grandfather              Colon Cancer No family hx of        Social History:  Presents to the ED alone    Physical Exam     Patient Vitals for the past 24 hrs:   BP Temp Temp src Pulse Resp SpO2   21 1715 125/64 -- -- 71 -- 97 %   21 1700 130/68 -- -- 79 -- --   21 1645 121/63 -- -- 75 -- 97 %   21 1630 109/55  -- -- 68 -- 93 %   12/03/21 1615 115/65 -- -- 80 -- --   12/03/21 1600 105/53 -- -- 72 -- 94 %   12/03/21 1545 109/53 -- -- 73 -- 99 %   12/03/21 1530 94/54 -- -- 73 -- 96 %   12/03/21 1515 97/56 -- -- 66 -- 95 %   12/03/21 1430 -- -- -- -- -- 96 %   12/03/21 1420 -- -- -- -- -- 97 %   12/03/21 1415 103/60 -- -- 79 -- 98 %   12/03/21 1410 102/63 -- -- -- -- --   12/03/21 1323 102/56 97.1  F (36.2  C) Temporal 89 18 100 %       Physical Exam    HEENT:   Conjunctiva are normal and without erythema.    NECK:   Supple, no meningismus.     CV:    Regular rate and rhythm     No murmurs, rubs or gallops.      2+ dorsalis pedis pulses bilateral.  PULM:   Clear to auscultation bilateral.      No respiratory distress.  No stridor.  ABD:   Soft, non-tender, non-distendend.      No rebound or guarding.  MSK:   Patient is non-tender over the thoracic spine; mild tenderness at upper lumbar spine    Mild tenderness at the right lower thoracic paraspinal musculature.      No step-off to the bony spine or overlying lesions.   LYMPH:  No cervical lymphadenopathy.  NEURO:  Strength is 5/5 and sensation is intact to the lower extremities bilateral.      2+ patellar tendon reflexes bilateral.      No clonus and down-going Babinski bilateral.  SKIN:   Warm, dry and intact.    PYSCH:   Mood is good and affect is appropriate.      Emergency Department Course     Imaging:  Abd/pelvis CT no contrast - Stone Protocol   Final Result   IMPRESSION:    1.  No acute findings in the abdomen and pelvis. No urinary system   calculi.   2.  Hepatic steatosis.   3.  Stable left adrenal benign adenoma.   4.  Colonic diverticulosis.   5.  Stable fat-containing right inguinal hernia.      KENDRA LOPEZ MD            SYSTEM ID:  [id number]      Lumbar spine CT w/o contrast   Final Result   IMPRESSION:     1. No evidence of acute fracture or subluxation in the lumbar spine.   2. Degenerative changes in the lower lumbar spine as detailed above,   most  pronounced at L5-S1.   3. At L5-S1, there is mild spinal canal narrowing with narrowing of   lateral recesses. Severe right and moderate left neural foraminal   narrowing.   4. At L4-L5, there is mild spinal canal narrowing as well as moderate   bilateral neural foraminal narrowing.       SULMA BENEDICT MD            SYSTEM ID:  [id number]          Laboratory:  Labs Ordered and Resulted from Time of ED Arrival to Time of ED Departure   BASIC METABOLIC PANEL - Abnormal       Result Value    Sodium 137      Potassium 4.3      Chloride 105      Carbon Dioxide (CO2) 23      Anion Gap 9      Urea Nitrogen 60 (*)     Creatinine 3.38 (*)     Calcium 9.6      Glucose 142 (*)     GFR Estimate 17 (*)    ROUTINE UA WITH MICROSCOPIC REFLEX TO CULTURE - Abnormal    Color Urine Yellow      Appearance Urine Slightly Cloudy (*)     Glucose Urine Negative      Bilirubin Urine Negative      Ketones Urine Negative      Specific Gravity Urine 1.019      Blood Urine Negative      pH Urine 5.0      Protein Albumin Urine 30  (*)     Urobilinogen Urine Normal      Nitrite Urine Negative      Leukocyte Esterase Urine Negative      Bacteria Urine Few (*)     Mucus Urine Present (*)     RBC Urine 2      WBC Urine 5      Squamous Epithelials Urine 2 (*)     Hyaline Casts Urine 3 (*)    CBC WITH PLATELETS AND DIFFERENTIAL - Abnormal    WBC Count 14.3 (*)     RBC Count 3.93 (*)     Hemoglobin 11.7 (*)     Hematocrit 37.3 (*)     MCV 95      MCH 29.8      MCHC 31.4 (*)     RDW 15.8 (*)     Platelet Count 350      % Neutrophils 69      % Lymphocytes 15      % Monocytes 9      % Eosinophils 2      % Basophils 1      % Immature Granulocytes 4      NRBCs per 100 WBC 0      Absolute Neutrophils 9.9 (*)     Absolute Lymphocytes 2.2      Absolute Monocytes 1.3      Absolute Eosinophils 0.2      Absolute Basophils 0.1      Absolute Immature Granulocytes 0.6 (*)     Absolute NRBCs 0.0     ISTAT CREATININE POCT - Abnormal    Creatinine POCT 3.6 (*)     GFR,  ESTIMATED POCT 15 (*)    CREATININE POCT - Abnormal    Creatinine 3.6 (*)     GFR 15     COVID-19 VIRUS (CORONAVIRUS) BY PCR   GLUCOSE MONITOR NURSING POCT   GLUCOSE MONITOR NURSING POCT       Emergency Department Course:    Reviewed:  I reviewed nursing notes, vitals and past history    Assessments:   I obtained history and examined the patient as noted above.    I rechecked the patient and explained findings.     Consults:   Dr. Hiren Arriaza of the hospital medicine service    Interventions:  Medications   lidocaine 1 % 0.1-1 mL (has no administration in time range)   lidocaine (LMX4) cream (has no administration in time range)   sodium chloride (PF) 0.9% PF flush 3 mL (has no administration in time range)   sodium chloride (PF) 0.9% PF flush 3 mL (has no administration in time range)   melatonin tablet 1 mg (has no administration in time range)   heparin ANTICOAGULANT injection 5,000 Units (has no administration in time range)   lactated ringers infusion (has no administration in time range)   acetaminophen (TYLENOL) tablet 975 mg (has no administration in time range)   oxyCODONE IR (ROXICODONE) half-tab 2.5 mg (has no administration in time range)   senna-docusate (SENOKOT-S/PERICOLACE) 8.6-50 MG per tablet 1 tablet (has no administration in time range)     Or   senna-docusate (SENOKOT-S/PERICOLACE) 8.6-50 MG per tablet 2 tablet (has no administration in time range)   polyethylene glycol (MIRALAX) Packet 17 g (has no administration in time range)   ondansetron (ZOFRAN-ODT) ODT tab 4 mg (has no administration in time range)     Or   ondansetron (ZOFRAN) injection 4 mg (has no administration in time range)   traMADol (ULTRAM) tablet 50 mg (has no administration in time range)   Lidocaine (LIDOCARE) 4 % Patch 1 patch (has no administration in time range)   lidocaine patch in PLACE (has no administration in time range)   glucose gel 15-30 g (has no administration in time range)     Or   dextrose 50 % injection 25-50 mL  (has no administration in time range)     Or   glucagon injection 1 mg (has no administration in time range)   insulin aspart (NovoLOG) injection (RAPID ACTING) (has no administration in time range)   insulin aspart (NovoLOG) injection (RAPID ACTING) (has no administration in time range)   hydrALAZINE (APRESOLINE) injection 10 mg (has no administration in time range)   0.9% sodium chloride BOLUS (0 mLs Intravenous Stopped 12/3/21 1612)       Disposition:  The patient was admitted to the hospital under the care of Dr. Arriaza.    Impression & Plan      Medical Decision Makin-year-old male presented to the ED with 1 week of right lower thoracic back pain.  He has no features concerning for epidural abscess, epidural hematoma, discitis or cauda equina syndrome.  Given his age and location of symptoms, CT scan of the lumbar spine and abdomen/pelvis were undertaken.  No acute pathology noted.  Patient's pain remarkably improved in the ED with interventions as described above but was unfortunately noted to have profound renal insufficiency with a creatinine 3.3 and baseline of 1.1.  No evidence of urinary retention.  Patient has been taking NSAIDs and reports poor oral intake for the last 1 week likely led to the declining health of his wife.  Patient given IV fluids and will require admission the hospital for acute renal insufficiency.    Covid-19  Lance Ibrahim was evaluated during a global COVID-19 pandemic, which necessitated consideration that the patient might be at risk for infection with the SARS-CoV-2 virus that causes COVID-19.   Applicable protocols for evaluation were followed during the patient's care.   COVID-19 was considered as part of the patient's evaluation. The plan for testing is:  a test was obtained during this visit.    Diagnosis:    ICD-10-CM    1. Acute renal insufficiency  N28.9    2. Acute right-sided thoracic back pain  M54.6        Discharge Medications:  New Prescriptions    No  medications on file          Kevin Lowe MD  12/03/21 2433

## 2021-12-03 NOTE — ED NOTES
"[facility]  ED Nurse Handoff Report    Lance Ibrahim is a 76 year old male   ED Chief complaint: Back Pain  . ED Diagnosis:   Final diagnoses:   Acute renal insufficiency   Acute right-sided thoracic back pain     Allergies:   Allergies   Allergen Reactions     Penicillins Anaphylaxis     \"anaphylactic\"     Sulfa Drugs      \"itchy rash, swelling of face and hands\"     Ancef [Cefazolin] Rash       Code Status: Full Code  Activity level - Baseline/Home:  Independent. Activity Level - Current:   Stand by Assist. Lift room needed: No. Bariatric: No   Needed: No   Isolation: No. Infection: Not Applicable.     Vital Signs:   Vitals:    12/03/21 1515 12/03/21 1530 12/03/21 1545 12/03/21 1600   BP: 97/56 94/54 109/53    Pulse: 66 73 73    Resp:       Temp:       TempSrc:       SpO2: 95% 96% 99% 94%       Cardiac Rhythm:  ,      Pain level:    Patient confused: No. Patient Falls Risk: Yes.   Elimination Status: Has voided   Patient Report - Initial Complaint: Back pain. Focused Assessment: Pt presents to ED with lower back pain, ongoing for about one week. Worse with movement. Pt's Cr 3.6, so being admitted to acute kidney injury. Pt given 1L NS and one dose IV dilaudid which helped pain. Of note, patient's wife passed away while pt in ED. Pt had wanted to be roomed with her upstairs, but no longer needed. ABCs intact. A&OX4.    Tests Performed: Labs, urine, CT. Abnormal Results:   Labs Ordered and Resulted from Time of ED Arrival to Time of ED Departure   BASIC METABOLIC PANEL - Abnormal       Result Value    Sodium 137      Potassium 4.3      Chloride 105      Carbon Dioxide (CO2) 23      Anion Gap 9      Urea Nitrogen 60 (*)     Creatinine 3.38 (*)     Calcium 9.6      Glucose 142 (*)     GFR Estimate 17 (*)    ROUTINE UA WITH MICROSCOPIC REFLEX TO CULTURE - Abnormal    Color Urine Yellow      Appearance Urine Slightly Cloudy (*)     Glucose Urine Negative      Bilirubin Urine Negative      Ketones " Urine Negative      Specific Gravity Urine 1.019      Blood Urine Negative      pH Urine 5.0      Protein Albumin Urine 30  (*)     Urobilinogen Urine Normal      Nitrite Urine Negative      Leukocyte Esterase Urine Negative      Bacteria Urine Few (*)     Mucus Urine Present (*)     RBC Urine 2      WBC Urine 5      Squamous Epithelials Urine 2 (*)     Hyaline Casts Urine 3 (*)    CBC WITH PLATELETS AND DIFFERENTIAL - Abnormal    WBC Count 14.3 (*)     RBC Count 3.93 (*)     Hemoglobin 11.7 (*)     Hematocrit 37.3 (*)     MCV 95      MCH 29.8      MCHC 31.4 (*)     RDW 15.8 (*)     Platelet Count 350      % Neutrophils 69      % Lymphocytes 15      % Monocytes 9      % Eosinophils 2      % Basophils 1      % Immature Granulocytes 4      NRBCs per 100 WBC 0      Absolute Neutrophils 9.9 (*)     Absolute Lymphocytes 2.2      Absolute Monocytes 1.3      Absolute Eosinophils 0.2      Absolute Basophils 0.1      Absolute Immature Granulocytes 0.6 (*)     Absolute NRBCs 0.0     ISTAT CREATININE POCT - Abnormal    Creatinine POCT 3.6 (*)     GFR, ESTIMATED POCT 15 (*)    CREATININE POCT - Abnormal    Creatinine 3.6 (*)     GFR 15     COVID-19 VIRUS (CORONAVIRUS) BY PCR     Abd/pelvis CT no contrast - Stone Protocol   Final Result   IMPRESSION:    1.  No acute findings in the abdomen and pelvis. No urinary system   calculi.   2.  Hepatic steatosis.   3.  Stable left adrenal benign adenoma.   4.  Colonic diverticulosis.   5.  Stable fat-containing right inguinal hernia.      KENDRA LOPEZ MD            SYSTEM ID:  [id number]      Lumbar spine CT w/o contrast   Final Result   IMPRESSION:     1. No evidence of acute fracture or subluxation in the lumbar spine.   2. Degenerative changes in the lower lumbar spine as detailed above,   most pronounced at L5-S1.   3. At L5-S1, there is mild spinal canal narrowing with narrowing of   lateral recesses. Severe right and moderate left neural foraminal   narrowing.   4. At L4-L5, there  is mild spinal canal narrowing as well as moderate   bilateral neural foraminal narrowing.       SULMA BENEDICT MD            SYSTEM ID:  [id number]        .   Treatments provided: See MAR.   Family Comments: Family made aware.   OBS brochure/video discussed/provided to patient:  N/A  ED Medications:   Medications   HYDROmorphone (PF) (DILAUDID) injection 0.5 mg (0.5 mg Intravenous Given 12/3/21 1414)   0.9% sodium chloride BOLUS (0 mLs Intravenous Stopped 12/3/21 1612)     Drips infusing:  No  For the majority of the shift, the patient's behavior Green. Interventions performed were N/A.    Sepsis treatment initiated: No     Patient tested for COVID 19 prior to admission: YES    ED Nurse Name/Phone Number: Katya Mauro RN,   5:24 PM  RECEIVING UNIT ED HANDOFF REVIEW    Above ED Nurse Handoff Report was reviewed: Yes  Reviewed by: Di Porras RN on December 3, 2021 at 7:29 PM

## 2021-12-04 LAB
ANION GAP SERPL CALCULATED.3IONS-SCNC: 5 MMOL/L (ref 3–14)
BUN SERPL-MCNC: 46 MG/DL (ref 7–30)
CALCIUM SERPL-MCNC: 9.5 MG/DL (ref 8.5–10.1)
CHLORIDE BLD-SCNC: 105 MMOL/L (ref 94–109)
CO2 SERPL-SCNC: 27 MMOL/L (ref 20–32)
CREAT SERPL-MCNC: 2.31 MG/DL (ref 0.66–1.25)
ERYTHROCYTE [DISTWIDTH] IN BLOOD BY AUTOMATED COUNT: 15.5 % (ref 10–15)
GFR SERPL CREATININE-BSD FRML MDRD: 26 ML/MIN/1.73M2
GLUCOSE BLD-MCNC: 211 MG/DL (ref 70–99)
GLUCOSE BLDC GLUCOMTR-MCNC: 149 MG/DL (ref 70–99)
GLUCOSE BLDC GLUCOMTR-MCNC: 168 MG/DL (ref 70–99)
GLUCOSE BLDC GLUCOMTR-MCNC: 181 MG/DL (ref 70–99)
GLUCOSE BLDC GLUCOMTR-MCNC: 184 MG/DL (ref 70–99)
HCT VFR BLD AUTO: 38.6 % (ref 40–53)
HGB BLD-MCNC: 11.7 G/DL (ref 13.3–17.7)
MCH RBC QN AUTO: 28.9 PG (ref 26.5–33)
MCHC RBC AUTO-ENTMCNC: 30.3 G/DL (ref 31.5–36.5)
MCV RBC AUTO: 95 FL (ref 78–100)
PLATELET # BLD AUTO: 349 10E3/UL (ref 150–450)
POTASSIUM BLD-SCNC: 4.4 MMOL/L (ref 3.4–5.3)
RBC # BLD AUTO: 4.05 10E6/UL (ref 4.4–5.9)
SODIUM SERPL-SCNC: 137 MMOL/L (ref 133–144)
WBC # BLD AUTO: 13.2 10E3/UL (ref 4–11)

## 2021-12-04 PROCEDURE — 250N000013 HC RX MED GY IP 250 OP 250 PS 637: Performed by: INTERNAL MEDICINE

## 2021-12-04 PROCEDURE — 250N000011 HC RX IP 250 OP 636: Performed by: INTERNAL MEDICINE

## 2021-12-04 PROCEDURE — 99232 SBSQ HOSP IP/OBS MODERATE 35: CPT | Performed by: INTERNAL MEDICINE

## 2021-12-04 PROCEDURE — 258N000003 HC RX IP 258 OP 636: Performed by: INTERNAL MEDICINE

## 2021-12-04 PROCEDURE — 80048 BASIC METABOLIC PNL TOTAL CA: CPT | Performed by: INTERNAL MEDICINE

## 2021-12-04 PROCEDURE — 85027 COMPLETE CBC AUTOMATED: CPT | Performed by: INTERNAL MEDICINE

## 2021-12-04 PROCEDURE — 36415 COLL VENOUS BLD VENIPUNCTURE: CPT | Performed by: INTERNAL MEDICINE

## 2021-12-04 PROCEDURE — 120N000006 HC R&B PEDS

## 2021-12-04 RX ORDER — ISOSORBIDE MONONITRATE 30 MG/1
30 TABLET, EXTENDED RELEASE ORAL DAILY
Status: DISCONTINUED | OUTPATIENT
Start: 2021-12-04 | End: 2021-12-09 | Stop reason: HOSPADM

## 2021-12-04 RX ORDER — NALOXONE HYDROCHLORIDE 0.4 MG/ML
0.4 INJECTION, SOLUTION INTRAMUSCULAR; INTRAVENOUS; SUBCUTANEOUS
Status: DISCONTINUED | OUTPATIENT
Start: 2021-12-04 | End: 2021-12-09 | Stop reason: HOSPADM

## 2021-12-04 RX ORDER — LORATADINE 10 MG/1
10 TABLET ORAL AT BEDTIME
Status: DISCONTINUED | OUTPATIENT
Start: 2021-12-04 | End: 2021-12-09 | Stop reason: HOSPADM

## 2021-12-04 RX ORDER — FINASTERIDE 5 MG/1
5 TABLET, FILM COATED ORAL DAILY
Status: DISCONTINUED | OUTPATIENT
Start: 2021-12-04 | End: 2021-12-09 | Stop reason: HOSPADM

## 2021-12-04 RX ORDER — AMLODIPINE BESYLATE 5 MG/1
5 TABLET ORAL DAILY
Status: DISCONTINUED | OUTPATIENT
Start: 2021-12-04 | End: 2021-12-09 | Stop reason: HOSPADM

## 2021-12-04 RX ORDER — ATORVASTATIN CALCIUM 40 MG/1
40 TABLET, FILM COATED ORAL DAILY
Status: DISCONTINUED | OUTPATIENT
Start: 2021-12-04 | End: 2021-12-09 | Stop reason: HOSPADM

## 2021-12-04 RX ORDER — NALOXONE HYDROCHLORIDE 0.4 MG/ML
0.2 INJECTION, SOLUTION INTRAMUSCULAR; INTRAVENOUS; SUBCUTANEOUS
Status: DISCONTINUED | OUTPATIENT
Start: 2021-12-04 | End: 2021-12-09 | Stop reason: HOSPADM

## 2021-12-04 RX ORDER — ASPIRIN 81 MG/1
81 TABLET ORAL DAILY
Status: DISCONTINUED | OUTPATIENT
Start: 2021-12-04 | End: 2021-12-09 | Stop reason: HOSPADM

## 2021-12-04 RX ORDER — FLUTICASONE PROPIONATE 50 MCG
1 SPRAY, SUSPENSION (ML) NASAL AT BEDTIME
Status: DISCONTINUED | OUTPATIENT
Start: 2021-12-04 | End: 2021-12-09 | Stop reason: HOSPADM

## 2021-12-04 RX ORDER — FERROUS GLUCONATE 324(38)MG
324 TABLET ORAL DAILY
Status: DISCONTINUED | OUTPATIENT
Start: 2021-12-04 | End: 2021-12-09 | Stop reason: HOSPADM

## 2021-12-04 RX ADMIN — ACETAMINOPHEN 975 MG: 325 TABLET, FILM COATED ORAL at 06:04

## 2021-12-04 RX ADMIN — HEPARIN SODIUM 5000 UNITS: 5000 INJECTION INTRAVENOUS; SUBCUTANEOUS at 08:27

## 2021-12-04 RX ADMIN — INSULIN ASPART 1 UNITS: 100 INJECTION, SOLUTION INTRAVENOUS; SUBCUTANEOUS at 16:46

## 2021-12-04 RX ADMIN — SODIUM CHLORIDE, POTASSIUM CHLORIDE, SODIUM LACTATE AND CALCIUM CHLORIDE: 600; 310; 30; 20 INJECTION, SOLUTION INTRAVENOUS at 16:44

## 2021-12-04 RX ADMIN — OXYCODONE HYDROCHLORIDE 2.5 MG: 5 TABLET ORAL at 11:32

## 2021-12-04 RX ADMIN — LIDOCAINE 1 PATCH: 246 PATCH TOPICAL at 08:27

## 2021-12-04 RX ADMIN — HEPARIN SODIUM 5000 UNITS: 5000 INJECTION INTRAVENOUS; SUBCUTANEOUS at 21:54

## 2021-12-04 RX ADMIN — OXYCODONE HYDROCHLORIDE 2.5 MG: 5 TABLET ORAL at 06:10

## 2021-12-04 RX ADMIN — CYCLOBENZAPRINE HYDROCHLORIDE 7.5 MG: 5 TABLET, FILM COATED ORAL at 08:26

## 2021-12-04 RX ADMIN — OXYCODONE HYDROCHLORIDE 5 MG: 5 TABLET ORAL at 16:07

## 2021-12-04 RX ADMIN — FINASTERIDE 5 MG: 5 TABLET, FILM COATED ORAL at 16:07

## 2021-12-04 RX ADMIN — INSULIN ASPART 1 UNITS: 100 INJECTION, SOLUTION INTRAVENOUS; SUBCUTANEOUS at 11:29

## 2021-12-04 RX ADMIN — INSULIN ASPART 1 UNITS: 100 INJECTION, SOLUTION INTRAVENOUS; SUBCUTANEOUS at 08:18

## 2021-12-04 RX ADMIN — AMLODIPINE BESYLATE 5 MG: 5 TABLET ORAL at 16:07

## 2021-12-04 RX ADMIN — CYCLOBENZAPRINE HYDROCHLORIDE 7.5 MG: 5 TABLET, FILM COATED ORAL at 16:47

## 2021-12-04 RX ADMIN — ASPIRIN 81 MG: 81 TABLET, COATED ORAL at 16:07

## 2021-12-04 RX ADMIN — ACETAMINOPHEN 975 MG: 325 TABLET, FILM COATED ORAL at 13:39

## 2021-12-04 RX ADMIN — ATORVASTATIN CALCIUM 40 MG: 40 TABLET, FILM COATED ORAL at 16:07

## 2021-12-04 RX ADMIN — OXYCODONE HYDROCHLORIDE 5 MG: 5 TABLET ORAL at 22:04

## 2021-12-04 RX ADMIN — SODIUM CHLORIDE, POTASSIUM CHLORIDE, SODIUM LACTATE AND CALCIUM CHLORIDE: 600; 310; 30; 20 INJECTION, SOLUTION INTRAVENOUS at 06:05

## 2021-12-04 RX ADMIN — ACETAMINOPHEN 975 MG: 325 TABLET, FILM COATED ORAL at 21:52

## 2021-12-04 RX ADMIN — LORATADINE 10 MG: 10 TABLET ORAL at 21:54

## 2021-12-04 RX ADMIN — FERROUS GLUCONATE 324 MG: 324 TABLET ORAL at 16:07

## 2021-12-04 RX ADMIN — ISOSORBIDE MONONITRATE 30 MG: 30 TABLET, EXTENDED RELEASE ORAL at 16:07

## 2021-12-04 RX ADMIN — FLUTICASONE PROPIONATE 1 SPRAY: 50 SPRAY, METERED NASAL at 21:55

## 2021-12-04 ASSESSMENT — ACTIVITIES OF DAILY LIVING (ADL)
ADLS_ACUITY_SCORE: 8

## 2021-12-04 NOTE — PROGRESS NOTES
[facility] Progress Note    [facility]    [facility] consult for Lance who lost his wife (also here) yesterday.  Made several attempts to visit.  Nursing staff says Lance's  did come in this morning so I cleared the consult order.  They also agree that Lance might benefit/appreciate a visit from [facility] so I triaged it for tomorrow and reminded nursing that I am on call today and this evening.    [facility] will attempt to follow up tomorrow.    Snow Kumar     Intern    Pager: [phone]

## 2021-12-04 NOTE — PLAN OF CARE
"Pt admitted to pediatrics. VSS. Shaking and SOB on arrival with back pain but once he was able to void and got into bed with warm blankets and given tylenol, oxy and flexeril, he was able to relax. Voided in urinal and no post void residual. Alert and oriented. Has been primary caregiver for his wife for the past few years and she passed away tonight on 5th floor. Melatonin given for sleep. Pt states he wants to \"sleep and not think tonight\". Very gracious and thankful for everything. Up with cane and 1 assist. IVF infusing. Will monitor.  "

## 2021-12-04 NOTE — PROGRESS NOTES
[facility]    Hospitalist Progress Note  Provider : Deepika Lagos MD  Date of Service (when I saw the patient): 12/04/2021    Assessment & Plan    Lance Ibrahim is a 76 year old male with past medical history of hypertension, history of CVA in 1993, coronary artery disease, hyperlipidemia, obstructive sleep apnea, type 2 diabetes mellitus, chronic kidney disease stage IIIa who presented on 12/3/2021 with chief complaint of back pain. States that he has been taking Aleve for his back pain.  In the emergency department patient was found to have a temperature of 97.1  F, heart rate 89, blood pressure 102/56, respiratory rate 18, SPO2 100% on room air.  Lab work revealed a creatinine of 3.6 with a baseline of 1.1, leukocytosis of 14.3, hemoglobin 11.7.  The patient had a CT lumbar spine that showed no evidence of acute fracture, degenerative changes in the lumbar spine. The patient also had a CT abdomen and pelvis showing no acute findings, hepatic steatosis, stable left adrenal benign adenoma, colonic diverticulosis.    Dehydration  Acute Kidney Injury  Chronic Kidney Disease Stage 3a  - Baseline Cr = 1.1-1.3  -We will continue IV fluids.  - Avoid Nephrotoxins  - Encouraged PO intake  -We will monitor BMP daily      Acute on Chronic Low Back Pain  Degenerative Disc Disease  - CT lumbar spine showed degenerative changes most pronounced at L5-S1 with severe right and moderate left neural foraminal narrowing  - Consult PT/OT  - Pain control     Hypertension  - Hold torsemide and lisinopril secondary to DEAN  -Resumed amlodipine.     Diabetes Mellitus Type 2  - A1C = 7.5 on 11/1/2021  - ISS  - Hold PTA metformin secondary to DEAN     BPH  - Post voids  -We will continue home tamsulosin and finasteride     Chronic Medical Problems:  Hx of CVA in 1993  Gout  Hyperlipidemia  Coronary Artery Disease    DVT Prophylaxis: Pneumatic Compression Devices  Code Status: Full Code    Disposition: Expected  discharge: Anticipate more than 2 nights of hospital course    Deepika Lagos MD    Interval History   Patient seen and examined. He stated that he is having back pain.  He stated that his back pain has been going on for many years.  States that he was taking Aleve at home for his back pain.  Has no fever.  Denies nausea or vomiting.    -Data reviewed today: I reviewed all new labs and imaging results over the last 24 hours. I personally reviewed     Physical Exam   Temp: 98.3  F (36.8  C) Temp src: Oral BP: (!) 142/79 Pulse: 99   Resp: 24 SpO2: 93 % O2 Device: None (Room air)    Vitals:    12/03/21 2028   Weight: 105.6 kg (232 lb 14.4 oz)     Vital Signs with Ranges  Temp:  [97.8  F (36.6  C)-98.3  F (36.8  C)] 98.3  F (36.8  C)  Pulse:  [] 99  Resp:  [22-28] 24  BP: ()/(53-95) 142/79  SpO2:  [92 %-99 %] 93 %  I/O last 3 completed shifts:  In: 480 [P.O.:480]  Out: 1625 [Urine:1625]    GEN:  Alert, oriented x 3, appears comfortable, NAD.  HEENT:  Normocephalic/atraumatic, no scleral icterus, no nasal discharge, mouth moist.  CV:  Regular rate and rhythm, no murmur or JVD.  S1 + S2 noted, no S3 or S4.  LUNGS:  Clear to auscultation bilaterally without rales/rhonchi/wheezing/retractions.  Symmetric chest rise on inhalation noted.  ABD:  Active bowel sounds, soft, non-tender/non-distended.  No rebound/guarding/rigidity.  EXT:  No edema or cyanosis.  Hands/feet warm to touch with good signs of peripheral perfusion.  No joint synovitis noted.  SKIN:  Dry to touch, no exanthems noted in the visualized areas.  NEURO:  Symmetric muscle strength, sensation to touch grossly intact.  No new focal deficits appreciated.    Medications     lactated ringers 100 mL/hr at 12/04/21 0934       acetaminophen  975 mg Oral Q8H     amLODIPine  5 mg Oral Daily     aspirin  81 mg Oral Daily     atorvastatin  40 mg Oral Daily     ferrous gluconate  324 mg Oral Daily     finasteride  5 mg Oral Daily     fluticasone  1 spray Both  Nostrils At Bedtime     heparin ANTICOAGULANT  5,000 Units Subcutaneous Q12H     insulin aspart  1-7 Units Subcutaneous TID AC     insulin aspart  1-5 Units Subcutaneous At Bedtime     isosorbide mononitrate  30 mg Oral Daily     lidocaine  1 patch Transdermal Q24H     lidocaine   Transdermal Q8H     loratadine  10 mg Oral At Bedtime     sodium chloride (PF)  3 mL Intracatheter Q8H       Data   Recent Labs   Lab 12/04/21  1127 12/04/21  0852 12/04/21  0811 12/03/21  2036 12/03/21  1419 12/03/21  1414   WBC  --  13.2*  --   --   --  14.3*   HGB  --  11.7*  --   --   --  11.7*   MCV  --  95  --   --   --  95   PLT  --  349  --   --   --  350   NA  --  137  --   --   --  137   POTASSIUM  --  4.4  --   --   --  4.3   CHLORIDE  --  105  --   --   --  105   CO2  --  27  --   --   --  23   BUN  --  46*  --   --   --  60*   CR  --  2.31*  --   --  3.6* 3.38*   ANIONGAP  --  5  --   --   --  9   SHANNAN  --  9.5  --   --   --  9.6   * 211* 168*   < >  --  142*    < > = values in this interval not displayed.       No results found for this or any previous visit (from the past 24 hour(s)).

## 2021-12-04 NOTE — UTILIZATION REVIEW
"[facility]   Admission Status; Secondary Review Determination     Admission Date: 12/3/2021  1:26 PM      Under the authority of the Utilization Management Committee, the utilization review process indicated a secondary review on the above patient.  The review outcome is based on review of the medical records, discussions with staff, and applying clinical experience noted on the date of the review.        (X)      Inpatient Status Appropriate - This patient's medical care is consistent with medical management for inpatient care and reasonable inpatient medical practice.      () Observation Status Appropriate - This patient does not meet hospital inpatient criteria and is placed in observation status. If this patient's primary payer is Medicare and was admitted as an inpatient, Condition Code 44 should be used and patient status changed to \"observation\".   () Admission Status NOT Appropriate - This patient's medical care is not consistent with medical management for Inpatient or Observation Status.          RATIONALE FOR DETERMINATION   77 yo male with CAD, HTN, CA, DMII and CKD who presented to the ER with back pain. Labs on admission notable for Cr 3.6 and WBC 14.3. He was admitted with DEAN on CKD. Imaging of lumbar spine and abdomen/pelvis without acute pathology. Cr today remains well above baseline at 3.2 and still with elevated WBC to 13.2. He is requiring some IV narcotics and frequent PO narcotics for analgesia. Given above, including ongoing DEAN, leukocytosis and back pain he is appropriate for inpatient cares.     The severity of illness, intensity of service provided, expected LOS and risk for adverse outcome make the care complex, high risk and appropriate for hospital admission.    The information on this document is developed by the utilization review team in order for the business office to ensure compliance.  This only denotes the appropriateness of proper admission status and does not " reflect the quality of care rendered.         The definitions of Inpatient Status and Observation Status used in making the determination above are those provided in the CMS Coverage Manual, Chapter 1 and Chapter 6, section 70.4.      Sincerely,     Tsering Barakat MD MPH  Utilization Review  [facility].

## 2021-12-04 NOTE — PLAN OF CARE
Vital signs: Stable; afebrile. Mild hypertension.    Pain Control: Scheduled Tylenol x 1; PRN oxycodone x 1; lidocaine patch in place. Pain as low as a 5.   Diet: Regular; no appetite; drinking water.   Output: Voiding adequate amounts of yellow; clear urine.                Activity: Up to bathroom x 1 with stand-by assist and cane.     Continuous IV fluids running.       Will continue to monitor and provide for cares.

## 2021-12-05 ENCOUNTER — APPOINTMENT (OUTPATIENT)
Dept: PHYSICAL THERAPY | Facility: CLINIC | Age: 76
DRG: 683 | End: 2021-12-05
Attending: INTERNAL MEDICINE
Payer: COMMERCIAL

## 2021-12-05 ENCOUNTER — APPOINTMENT (OUTPATIENT)
Dept: OCCUPATIONAL THERAPY | Facility: CLINIC | Age: 76
DRG: 683 | End: 2021-12-05
Attending: INTERNAL MEDICINE
Payer: COMMERCIAL

## 2021-12-05 LAB
ANION GAP SERPL CALCULATED.3IONS-SCNC: 7 MMOL/L (ref 3–14)
BUN SERPL-MCNC: 29 MG/DL (ref 7–30)
CALCIUM SERPL-MCNC: 9.1 MG/DL (ref 8.5–10.1)
CHLORIDE BLD-SCNC: 106 MMOL/L (ref 94–109)
CO2 SERPL-SCNC: 23 MMOL/L (ref 20–32)
CREAT SERPL-MCNC: 1.66 MG/DL (ref 0.66–1.25)
ERYTHROCYTE [DISTWIDTH] IN BLOOD BY AUTOMATED COUNT: 15.4 % (ref 10–15)
GFR SERPL CREATININE-BSD FRML MDRD: 39 ML/MIN/1.73M2
GLUCOSE BLD-MCNC: 148 MG/DL (ref 70–99)
GLUCOSE BLDC GLUCOMTR-MCNC: 126 MG/DL (ref 70–99)
GLUCOSE BLDC GLUCOMTR-MCNC: 158 MG/DL (ref 70–99)
GLUCOSE BLDC GLUCOMTR-MCNC: 179 MG/DL (ref 70–99)
GLUCOSE BLDC GLUCOMTR-MCNC: 183 MG/DL (ref 70–99)
HCT VFR BLD AUTO: 34.3 % (ref 40–53)
HGB BLD-MCNC: 10.7 G/DL (ref 13.3–17.7)
MCH RBC QN AUTO: 29.2 PG (ref 26.5–33)
MCHC RBC AUTO-ENTMCNC: 31.2 G/DL (ref 31.5–36.5)
MCV RBC AUTO: 94 FL (ref 78–100)
PLATELET # BLD AUTO: 315 10E3/UL (ref 150–450)
POTASSIUM BLD-SCNC: 4.3 MMOL/L (ref 3.4–5.3)
RBC # BLD AUTO: 3.67 10E6/UL (ref 4.4–5.9)
SODIUM SERPL-SCNC: 136 MMOL/L (ref 133–144)
WBC # BLD AUTO: 11.4 10E3/UL (ref 4–11)

## 2021-12-05 PROCEDURE — 80048 BASIC METABOLIC PNL TOTAL CA: CPT | Performed by: INTERNAL MEDICINE

## 2021-12-05 PROCEDURE — 97161 PT EVAL LOW COMPLEX 20 MIN: CPT | Mod: GP

## 2021-12-05 PROCEDURE — 85027 COMPLETE CBC AUTOMATED: CPT | Performed by: INTERNAL MEDICINE

## 2021-12-05 PROCEDURE — 258N000003 HC RX IP 258 OP 636: Performed by: INTERNAL MEDICINE

## 2021-12-05 PROCEDURE — 120N000006 HC R&B PEDS

## 2021-12-05 PROCEDURE — 97166 OT EVAL MOD COMPLEX 45 MIN: CPT | Mod: GO | Performed by: REHABILITATION PRACTITIONER

## 2021-12-05 PROCEDURE — 99232 SBSQ HOSP IP/OBS MODERATE 35: CPT | Performed by: INTERNAL MEDICINE

## 2021-12-05 PROCEDURE — 250N000013 HC RX MED GY IP 250 OP 250 PS 637: Performed by: INTERNAL MEDICINE

## 2021-12-05 PROCEDURE — 97530 THERAPEUTIC ACTIVITIES: CPT | Mod: GP

## 2021-12-05 PROCEDURE — 250N000011 HC RX IP 250 OP 636: Performed by: INTERNAL MEDICINE

## 2021-12-05 PROCEDURE — 97535 SELF CARE MNGMENT TRAINING: CPT | Mod: GO | Performed by: REHABILITATION PRACTITIONER

## 2021-12-05 PROCEDURE — 250N000009 HC RX 250: Performed by: INTERNAL MEDICINE

## 2021-12-05 PROCEDURE — 36415 COLL VENOUS BLD VENIPUNCTURE: CPT | Performed by: INTERNAL MEDICINE

## 2021-12-05 RX ORDER — TAMSULOSIN HYDROCHLORIDE 0.4 MG/1
0.4 CAPSULE ORAL DAILY
Status: DISCONTINUED | OUTPATIENT
Start: 2021-12-05 | End: 2021-12-09 | Stop reason: HOSPADM

## 2021-12-05 RX ORDER — ALLOPURINOL 100 MG/1
50 TABLET ORAL DAILY
Status: DISCONTINUED | OUTPATIENT
Start: 2021-12-05 | End: 2021-12-07

## 2021-12-05 RX ORDER — SILVER SULFADIAZINE 10 MG/G
CREAM TOPICAL DAILY
Status: DISCONTINUED | OUTPATIENT
Start: 2021-12-05 | End: 2021-12-09 | Stop reason: HOSPADM

## 2021-12-05 RX ORDER — LIDOCAINE 4 G/G
1 PATCH TOPICAL
Status: DISCONTINUED | OUTPATIENT
Start: 2021-12-05 | End: 2021-12-09 | Stop reason: HOSPADM

## 2021-12-05 RX ADMIN — LORATADINE 10 MG: 10 TABLET ORAL at 21:59

## 2021-12-05 RX ADMIN — AMLODIPINE BESYLATE 5 MG: 5 TABLET ORAL at 08:29

## 2021-12-05 RX ADMIN — CYCLOBENZAPRINE HYDROCHLORIDE 7.5 MG: 5 TABLET, FILM COATED ORAL at 22:02

## 2021-12-05 RX ADMIN — HEPARIN SODIUM 5000 UNITS: 5000 INJECTION INTRAVENOUS; SUBCUTANEOUS at 20:21

## 2021-12-05 RX ADMIN — HEPARIN SODIUM 5000 UNITS: 5000 INJECTION INTRAVENOUS; SUBCUTANEOUS at 08:30

## 2021-12-05 RX ADMIN — LIDOCAINE 1 PATCH: 246 PATCH TOPICAL at 08:30

## 2021-12-05 RX ADMIN — ASPIRIN 81 MG: 81 TABLET, COATED ORAL at 08:30

## 2021-12-05 RX ADMIN — SILVER SULFADIAZINE: 10 CREAM TOPICAL at 11:03

## 2021-12-05 RX ADMIN — SODIUM CHLORIDE, POTASSIUM CHLORIDE, SODIUM LACTATE AND CALCIUM CHLORIDE: 600; 310; 30; 20 INJECTION, SOLUTION INTRAVENOUS at 03:38

## 2021-12-05 RX ADMIN — FINASTERIDE 5 MG: 5 TABLET, FILM COATED ORAL at 08:29

## 2021-12-05 RX ADMIN — ALLOPURINOL 50 MG: 100 TABLET ORAL at 11:01

## 2021-12-05 RX ADMIN — INSULIN ASPART 1 UNITS: 100 INJECTION, SOLUTION INTRAVENOUS; SUBCUTANEOUS at 08:38

## 2021-12-05 RX ADMIN — OXYCODONE HYDROCHLORIDE 5 MG: 5 TABLET ORAL at 02:12

## 2021-12-05 RX ADMIN — SODIUM CHLORIDE, POTASSIUM CHLORIDE, SODIUM LACTATE AND CALCIUM CHLORIDE: 600; 310; 30; 20 INJECTION, SOLUTION INTRAVENOUS at 14:54

## 2021-12-05 RX ADMIN — Medication 50 MG: at 19:28

## 2021-12-05 RX ADMIN — LIDOCAINE 1 PATCH: 246 PATCH TOPICAL at 10:52

## 2021-12-05 RX ADMIN — INSULIN ASPART 1 UNITS: 100 INJECTION, SOLUTION INTRAVENOUS; SUBCUTANEOUS at 12:07

## 2021-12-05 RX ADMIN — FLUTICASONE PROPIONATE 1 SPRAY: 50 SPRAY, METERED NASAL at 21:59

## 2021-12-05 RX ADMIN — ACETAMINOPHEN 975 MG: 325 TABLET, FILM COATED ORAL at 06:24

## 2021-12-05 RX ADMIN — ACETAMINOPHEN 975 MG: 325 TABLET, FILM COATED ORAL at 13:43

## 2021-12-05 RX ADMIN — ATORVASTATIN CALCIUM 40 MG: 40 TABLET, FILM COATED ORAL at 08:30

## 2021-12-05 RX ADMIN — FERROUS GLUCONATE 324 MG: 324 TABLET ORAL at 08:30

## 2021-12-05 RX ADMIN — ISOSORBIDE MONONITRATE 30 MG: 30 TABLET, EXTENDED RELEASE ORAL at 08:29

## 2021-12-05 RX ADMIN — OXYCODONE HYDROCHLORIDE 5 MG: 5 TABLET ORAL at 13:43

## 2021-12-05 RX ADMIN — TAMSULOSIN HYDROCHLORIDE 0.4 MG: 0.4 CAPSULE ORAL at 10:52

## 2021-12-05 RX ADMIN — OXYCODONE HYDROCHLORIDE 5 MG: 5 TABLET ORAL at 06:25

## 2021-12-05 RX ADMIN — Medication 50 MG: at 00:04

## 2021-12-05 ASSESSMENT — ACTIVITIES OF DAILY LIVING (ADL)
ADLS_ACUITY_SCORE: 10
ADLS_ACUITY_SCORE: 8
ADLS_ACUITY_SCORE: 10
ADLS_ACUITY_SCORE: 9
ADLS_ACUITY_SCORE: 8
ADLS_ACUITY_SCORE: 8
ADLS_ACUITY_SCORE: 9
ADLS_ACUITY_SCORE: 9
ADLS_ACUITY_SCORE: 10
ADLS_ACUITY_SCORE: 10
ADLS_ACUITY_SCORE: 9
ADLS_ACUITY_SCORE: 8
ADLS_ACUITY_SCORE: 8
ADLS_ACUITY_SCORE: 9
ADLS_ACUITY_SCORE: 10
ADLS_ACUITY_SCORE: 8
ADLS_ACUITY_SCORE: 10
ADLS_ACUITY_SCORE: 8
ADLS_ACUITY_SCORE: 8
ADLS_ACUITY_SCORE: 9
ADLS_ACUITY_SCORE: 10
ADLS_ACUITY_SCORE: 10
ADLS_ACUITY_SCORE: 8
ADLS_ACUITY_SCORE: 8

## 2021-12-05 NOTE — PROGRESS NOTES
SPIRITUAL HEALTH SERVICES Progress Note  [facility] Peds Unit - Adult Overflow    Saw pt Lance per a [facility] consult; staff requesting emotional support for pt upon the death of his spouse.  Lance reported that his wife  yesterday.  His Zoroastrian  from [facility] saw him yesterday.  Lance called his step-son Omari, who is helping with  arrangements.  Omari plans to see Lance later in the day.  After calling Omari, Lance reported that he no further needs at this time but welcomed prayer.      Plan: Informed pt how he can request further  support as needs arise.  [facility] will continue to follow pt and his family.       Aaron Biggs M.Div., [facility]  Staff   Phone [phone]

## 2021-12-05 NOTE — PROGRESS NOTES
12/04/21 0924   Quick Adds   Type of Visit Initial Occupational Therapy Evaluation   Living Environment   People in home alone  (spouse passed away last night while at [facility])   Current Living Arrangements house   Living Environment Comments uses walk in shower, uses RTS with armrests. Patient plans to move to [place] into an apt wo be closer to family   Self-Care   Equipment Currently Used at Home cane, straight;raised toilet seat;walker, rolling   Activity/Exercise/Self-Care Comment Patient had been the caregiver for his spouse, who passed away last night. Patient reside in 5000 sq foot home, plans to move. Patient was ordering groceries from [employer], he was not running any errands. While speaking with OT, patient became very upset, dealing with many emotions. Feels very unclear of over plan and future at home.    Disability/Function   Hearing Difficulty or Deaf yes   Number of times patient has fallen within last six months 1  (icy driveway)   Change in Functional Status Since Onset of Current Illness/Injury yes   General Information   Onset of Illness/Injury or Date of Surgery 12/03/21   Referring Physician Jacob Arriaza, DO   Patient/Family Therapy Goal Statement (OT) to return home   Additional Occupational Profile Info/Pertinent History of Current Problem Lance Ibrahim is a 76 year old male with past medical history of hypertension, history of CVA in 1993, coronary artery disease, hyperlipidemia, obstructive sleep apnea, type 2 diabetes mellitus, chronic kidney disease stage IIIa who presented on 12/3/2021 with chief complaint of back pain.  The patient states that he has chronic back pain that he deals with.  Over the last week he has had worsening of his back pain.  He describes it as constant and located in the right side of his lower thoracic.  He states that any movement exacerbates the pain.  He states it radiates up his back across his shoulder blades, and up to his neck.  He does report that  his wife is currently admitted at [facility] and is comfort care.  He has been having a difficult time dealing with this at home. Spouse passed away on 12/3/21 at [facility]. DX with DEAN, CKD stage 3a, dehydration, LBP.   Existing Precautions/Restrictions fall   General Observations and Info patient was in bed, agreeable to co-eval with PT as patient wants to shower.   Cognitive Status Examination   Orientation Status orientation to person, place and time   Cognitive Status Comments patient is heavily grieving the very recent loss of spouse. Patietn has limited tolerance for extensive questions.    Visual Perception   Visual Impairment/Limitations   (wears glasses)   Pain Assessment   Patient Currently in Pain Yes, see Vital Sign flowsheet  (rating not provided)   Posture   Posture not impaired   Range of Motion Comprehensive   Comment, General Range of Motion limited ROM in R shoulder   Strength Comprehensive (MMT)   Comment, General Manual Muscle Testing (MMT) Assessment general weakness   Muscle Tone Assessment   Muscle Tone Quick Adds No deficits were identified   Coordination   Coordination Comments limited grasp in R hand d/t gout   Bed Mobility   Comment (Bed Mobility) defer to PT   Transfers   Transfer Comments Ax2 for sit<>stand, fww   Balance   Balance Comments decreased balance, requires support from grab bars or fww for balance   Activities of Daily Living   BADL Assessment upper body dressing;lower body dressing;bathing   Bathing Assessment   [place] Level (Bathing) moderate assist (50% patient effort)   Upper Body Dressing Assessment   [place] Level (Upper Body Dressing) moderate assist (50% patient effort)   Lower Body Dressing Assessment   [place] Level (Lower Body Dressing) dependent (less than 25% patient effort)   Clinical Impression   Criteria for Skilled Therapeutic Interventions Met (OT) yes;meets criteria;skilled treatment is necessary   OT Diagnosis decreased ADL/IADLs   OT  Problem List-Impairments impacting ADL activity tolerance impaired;balance;mobility;range of motion (ROM);strength;pain;other (see comments)  (very emotional d/t loss of spouse)   Assessment of Occupational Performance 5 or more Performance Deficits   Identified Performance Deficits dsg, toileting, bathing, functional mobility, household chores   Planned Therapy Interventions (OT) ADL retraining;strengthening;transfer training;progressive activity/exercise   Clinical Decision Making Complexity (OT) moderate complexity   Therapy Frequency (OT) 5x/week   Risk & Benefits of therapy have been explained evaluation/treatment results reviewed;care plan/treatment goals reviewed;participants voiced agreement with care plan;patient   OT Discharge Planning    OT Discharge Recommendation (DC Rec) Transitional Care Facility;home with home care occupational therapy;Home with assist   OT Rationale for DC Rec patient is well below baseline for ADl/IADLs and safe functional mobility. Skilled OT is recommended in IP and TCU setting to maximize strength, ADl  and safe functional mobility I and minimize pain. patient resides alone is unsafe to return home alone. Currently needs Ax1-2 for bed mobility and safe transfers as well as A with all ADl- dsg, toileting and bathing. If patient is unable to transfer to TCU, then will require 24?7 A for all mobility and ADL as well as home PT/OT/RN support and HHA for bathing,   Total Evaluation Time (Minutes)   Total Evaluation Time (Minutes) 13

## 2021-12-05 NOTE — PLAN OF CARE
PT A/Ox4, VSS up x1 with cane, C/O lower and upper back pain PRN oxy given x2 and PRN tramadol given with some relief. Pain increases with activity. LR infusing at 100mL/hr. Patient voiced concern about mobility and back pain. Will continue with current POC.

## 2021-12-05 NOTE — PROGRESS NOTES
12/05/21 1000   Quick Adds   Type of Visit Initial PT Evaluation   Living Environment   Living Environment Comments Patient now lives alone in a large 2 story house as spouse just passed away in the hospital the other day. Living environment acquired from chart and discussion with other [facility] members. I   Self-Care   Usual Activity Tolerance moderate   Current Activity Tolerance poor   Equipment Currently Used at Home cane, straight;raised toilet seat;walker, rolling   Activity/Exercise/Self-Care Comment At baseline patient mobilizing with single point cane. He had been the main caregiver to his spouse in the recent years.    Disability/Function   Fall history within last six months yes   Number of times patient has fallen within last six months 1   Change in Functional Status Since Onset of Current Illness/Injury yes   General Information   Onset of Illness/Injury or Date of Surgery 12/03/21   Referring Physician Jacob Arriaza, DO   Patient/Family Therapy Goals Statement (PT) To improve pain   Pertinent History of Current Problem (include personal factors and/or comorbidities that impact the POC) 76 year old male with past medical history of hypertension, history of CVA in 1993, coronary artery disease, hyperlipidemia, obstructive sleep apnea, type 2 diabetes mellitus, chronic kidney disease stage IIIa who presented on 12/3/2021 with chief complaint of back pain. States that he has been taking Aleve for his back pain.   Existing Precautions/Restrictions fall   General Observations Greeted patient supine in bed.    Cognition   Orientation Status (Cognition) oriented x 4   Affect/Mental Status (Cognition) WFL   Follows Commands (Cognition) WFL   Pain Assessment   Patient Currently in Pain   (reports R shoulder, hand and back pain. )   Posture    Posture Forward head position;Protracted shoulders   Range of Motion (ROM)   ROM Comment B LE WFL   Strength   Strength Comments B LE functionally weak, further  limited by pain   Bed Mobility   Comment (Bed Mobility) supine > EOB at modA   Transfers   Transfer Safety Comments sit <> stand with FWW & elevated bed surface at modAx2    Gait/Stairs (Locomotion)   Comment (Gait/Stairs) ambulation with FWW at minAx2    Balance   Balance Comments required use of FWW, unsteadiness present   Clinical Impression   Criteria for Skilled Therapeutic Intervention yes, treatment indicated   PT Diagnosis (PT) impaired functional mobility   Influenced by the following impairments pain, LE weakness, decreased activity tolerance    Functional limitations due to impairments bed mobility, transfers, ambulation, stairs   Clinical Presentation Stable/Uncomplicated   Clinical Presentation Rationale PMH, current presentation, clinical judgement   Clinical Decision Making (Complexity) low complexity   Therapy Frequency (PT) 5x/week   Predicted Duration of Therapy Intervention (days/wks) 1 week   Planned Therapy Interventions (PT) balance training;bed mobility training;gait training;home exercise program;neuromuscular re-education;patient/family education;stair training;strengthening;transfer training;progressive activity/exercise   Risk & Benefits of therapy have been explained evaluation/treatment results reviewed;care plan/treatment goals reviewed;patient   PT Discharge Planning    PT Discharge Recommendation (DC Rec) Transitional Care Facility   PT Rationale for DC Rec Patient is below baseline for functional mobility currently requiring assist of 1-2 with use of a walker & would benefit from continued physical therapy in the setting of a TCU for improving functional mobility, LE strength and tolerance for functional activities in order to return to baseline of functioning. If TCU is unable to be accommodated patient would require 24/7 assist of 1-2 for all OOB activities with use of FWW along with assist for all ADLs (see OT note for details) and household chores in order to return to home  setting; plus HH-PT to advance functional mobility to baseline.    Total Evaluation Time   Total Evaluation Time (Minutes) 8

## 2021-12-05 NOTE — PROGRESS NOTES
[facility]    Hospitalist Progress Note  Provider : Deepika Lagos MD  Date of Service (when I saw the patient): 12/05/2021    Assessment & Plan    Lance Ibrahim is a 76 year old male with past medical history of hypertension, history of CVA in 1993, coronary artery disease, hyperlipidemia, obstructive sleep apnea, type 2 diabetes mellitus, chronic kidney disease stage IIIa who presented on 12/3/2021 with chief complaint of back pain. States that he has been taking Aleve for his back pain.  In the emergency department patient was found to have a temperature of 97.1  F, heart rate 89, blood pressure 102/56, respiratory rate 18, SPO2 100% on room air.  Lab work revealed a creatinine of 3.6 with a baseline of 1.1, leukocytosis of 14.3, hemoglobin 11.7. The patient had a CT lumbar spine that showed no evidence of acute fracture, degenerative changes in the lumbar spine. The patient also had a CT abdomen and pelvis showing no acute findings, hepatic steatosis, stable left adrenal benign adenoma, colonic diverticulosis.    Dehydration  Acute Kidney Injury  Chronic Kidney Disease Stage 3a  -Baseline Cr = 1.1-1.3  -Creatinine 3.38 on admission. Creatinine down to 1.66 today  -We will continue IV fluids.  -Avoid Nephrotoxins  -Encouraged PO intake  -We will monitor BMP daily      Acute on Chronic Low Back Pain  Degenerative Disc Disease  -CT lumbar spine showed degenerative changes most pronounced at L5-S1 with severe right and moderate left neural foraminal narrowing  -Consult PT/OT  -Pain control  -Will consult pain management team for recommendations for optimal pain control.      Hypertension  -Hold torsemide and lisinopril secondary to DEAN  -Resumed amlodipine.     Diabetes Mellitus Type 2  - A1C = 7.5 on 11/1/2021  -We will continue ISS  - Hold PTA metformin secondary to DEAN     BPH  -Post voids  -We will continue home tamsulosin and finasteride    Gout  -Resumed allopurinol with renal  dosing.      Hx of CVA in 1993  -We will continue aspirin, atorvastatin    Hyperlipidemia  -We will continue atorvastatin    Hypertension  -We will continue amlodipine.  Will monitor blood pressure    Coronary Artery Disease  -We will continue aspirin, Lipitor, nitroglycerin, atorvastatin    DVT Prophylaxis: Pneumatic Compression Devices  Code Status: Full Code    Disposition: Expected discharge: Anticipate more than 2 nights of hospital course    Deepika Lagos MD    Interval History   Patient seen and examined.  He stated that he still having significant back pain.  He also complains of right shoulder pain.  He has no nausea or vomiting.  He denies fever.    -Data reviewed today: I reviewed all new labs and imaging results over the last 24 hours. I personally reviewed     Physical Exam   Temp: 98.1  F (36.7  C) Temp src: Oral BP: 136/77 Pulse: 94   Resp: 18 SpO2: 93 % O2 Device: None (Room air)    Vitals:    12/03/21 2028   Weight: 105.6 kg (232 lb 14.4 oz)     Vital Signs with Ranges  Temp:  [97.5  F (36.4  C)-98.4  F (36.9  C)] 98.1  F (36.7  C)  Pulse:  [82-99] 94  Resp:  [18-24] 18  BP: (132-142)/(70-79) 136/77  SpO2:  [93 %-94 %] 93 %  I/O last 3 completed shifts:  In: 2755 [P.O.:1100; I.V.:1655]  Out: 1450 [Urine:1450]    GEN:  Alert, oriented x 3, appears comfortable, NAD.  HEENT:  Normocephalic/atraumatic, no scleral icterus, no nasal discharge, mouth moist.  CV:  Regular rate and rhythm, no murmur or JVD.  S1 + S2 noted, no S3 or S4.  LUNGS:  Clear to auscultation bilaterally without rales/rhonchi/wheezing/retractions.  Symmetric chest rise on inhalation noted.  ABD:  Active bowel sounds, soft, non-tender/non-distended.  No rebound/guarding/rigidity.  EXT:  No edema or cyanosis.  Hands/feet warm to touch with good signs of peripheral perfusion.  No joint synovitis noted.  SKIN:  Dry to touch, no exanthems noted in the visualized areas.  NEURO:  Symmetric muscle strength, sensation to touch grossly  intact.  No new focal deficits appreciated.    Medications     lactated ringers 100 mL/hr at 12/05/21 0338       acetaminophen  975 mg Oral Q8H     allopurinol  50 mg Oral Daily     amLODIPine  5 mg Oral Daily     aspirin  81 mg Oral Daily     atorvastatin  40 mg Oral Daily     ferrous gluconate  324 mg Oral Daily     finasteride  5 mg Oral Daily     fluticasone  1 spray Both Nostrils At Bedtime     heparin ANTICOAGULANT  5,000 Units Subcutaneous Q12H     insulin aspart  1-7 Units Subcutaneous TID AC     insulin aspart  1-5 Units Subcutaneous At Bedtime     isosorbide mononitrate  30 mg Oral Daily     lidocaine  1 patch Transdermal Q24H     lidocaine  1 patch Transdermal Q24H     lidocaine   Transdermal Q8H     lidocaine   Transdermal Q8H     loratadine  10 mg Oral At Bedtime     silver sulfADIAZINE   Topical Daily     sodium chloride (PF)  3 mL Intracatheter Q8H     tamsulosin  0.4 mg Oral Daily       Data   Recent Labs   Lab 12/05/21  0836 12/05/21  0443 12/04/21  2206 12/04/21  1127 12/04/21  0852 12/03/21  2036 12/03/21  1419 12/03/21  1414   WBC  --  11.4*  --   --  13.2*  --   --  14.3*   HGB  --  10.7*  --   --  11.7*  --   --  11.7*   MCV  --  94  --   --  95  --   --  95   PLT  --  315  --   --  349  --   --  350   NA  --  136  --   --  137  --   --  137   POTASSIUM  --  4.3  --   --  4.4  --   --  4.3   CHLORIDE  --  106  --   --  105  --   --  105   CO2  --  23  --   --  27  --   --  23   BUN  --  29  --   --  46*  --   --  60*   CR  --  1.66*  --   --  2.31*  --  3.6* 3.38*   ANIONGAP  --  7  --   --  5  --   --  9   SHANNAN  --  9.1  --   --  9.5  --   --  9.6   * 148* 181*   < > 211*   < >  --  142*    < > = values in this interval not displayed.       No results found for this or any previous visit (from the past 24 hour(s)).

## 2021-12-05 NOTE — PLAN OF CARE
Orientation:A&ox3  Vss afebrile. 93-95% ra  LS:clear  GI:bs+, tolerating regular diet  : voiding with no problems  Skin: right great toe. Silvedene applied per order.  Activity: up with sba of 1 with cane or walker  Pain: c/o op of right shoulder pain. Back and shoulder pain better with lidopatches.  Plan:ivf, pt/ot following. Wound cares per order. Allupurinol started.

## 2021-12-05 NOTE — PLAN OF CARE
Pt up with 1 assist and cane. Back pain and flexeril and oxy given for that. Declined PT and OT today as he is overwhelmed and sad. Listened to his frustration. Will try to continue to get pain under control and to make him comfortable. Face adrianna and flushed. Reddened knuckles due to gout.Will monitor.

## 2021-12-06 ENCOUNTER — APPOINTMENT (OUTPATIENT)
Dept: PHYSICAL THERAPY | Facility: CLINIC | Age: 76
DRG: 683 | End: 2021-12-06
Payer: COMMERCIAL

## 2021-12-06 ENCOUNTER — APPOINTMENT (OUTPATIENT)
Dept: GENERAL RADIOLOGY | Facility: CLINIC | Age: 76
DRG: 683 | End: 2021-12-06
Attending: INTERNAL MEDICINE
Payer: COMMERCIAL

## 2021-12-06 LAB
ANION GAP SERPL CALCULATED.3IONS-SCNC: 6 MMOL/L (ref 3–14)
BUN SERPL-MCNC: 19 MG/DL (ref 7–30)
CALCIUM SERPL-MCNC: 9.3 MG/DL (ref 8.5–10.1)
CHLORIDE BLD-SCNC: 103 MMOL/L (ref 94–109)
CO2 SERPL-SCNC: 26 MMOL/L (ref 20–32)
CREAT SERPL-MCNC: 1.45 MG/DL (ref 0.66–1.25)
ERYTHROCYTE [DISTWIDTH] IN BLOOD BY AUTOMATED COUNT: 15.1 % (ref 10–15)
GFR SERPL CREATININE-BSD FRML MDRD: 46 ML/MIN/1.73M2
GLUCOSE BLD-MCNC: 181 MG/DL (ref 70–99)
GLUCOSE BLDC GLUCOMTR-MCNC: 143 MG/DL (ref 70–99)
GLUCOSE BLDC GLUCOMTR-MCNC: 153 MG/DL (ref 70–99)
GLUCOSE BLDC GLUCOMTR-MCNC: 157 MG/DL (ref 70–99)
GLUCOSE BLDC GLUCOMTR-MCNC: 196 MG/DL (ref 70–99)
HCT VFR BLD AUTO: 34.4 % (ref 40–53)
HGB BLD-MCNC: 10.8 G/DL (ref 13.3–17.7)
MCH RBC QN AUTO: 29.2 PG (ref 26.5–33)
MCHC RBC AUTO-ENTMCNC: 31.4 G/DL (ref 31.5–36.5)
MCV RBC AUTO: 93 FL (ref 78–100)
PLATELET # BLD AUTO: 323 10E3/UL (ref 150–450)
POTASSIUM BLD-SCNC: 4 MMOL/L (ref 3.4–5.3)
RBC # BLD AUTO: 3.7 10E6/UL (ref 4.4–5.9)
SODIUM SERPL-SCNC: 135 MMOL/L (ref 133–144)
WBC # BLD AUTO: 14 10E3/UL (ref 4–11)

## 2021-12-06 PROCEDURE — 250N000013 HC RX MED GY IP 250 OP 250 PS 637: Performed by: INTERNAL MEDICINE

## 2021-12-06 PROCEDURE — 82310 ASSAY OF CALCIUM: CPT | Performed by: INTERNAL MEDICINE

## 2021-12-06 PROCEDURE — 250N000011 HC RX IP 250 OP 636: Performed by: INTERNAL MEDICINE

## 2021-12-06 PROCEDURE — 99221 1ST HOSP IP/OBS SF/LOW 40: CPT | Performed by: CLINICAL NURSE SPECIALIST

## 2021-12-06 PROCEDURE — 97116 GAIT TRAINING THERAPY: CPT | Mod: GP | Performed by: PHYSICAL THERAPIST

## 2021-12-06 PROCEDURE — 97530 THERAPEUTIC ACTIVITIES: CPT | Mod: GP | Performed by: PHYSICAL THERAPIST

## 2021-12-06 PROCEDURE — 99232 SBSQ HOSP IP/OBS MODERATE 35: CPT | Performed by: INTERNAL MEDICINE

## 2021-12-06 PROCEDURE — 120N000006 HC R&B PEDS

## 2021-12-06 PROCEDURE — 250N000013 HC RX MED GY IP 250 OP 250 PS 637: Performed by: CLINICAL NURSE SPECIALIST

## 2021-12-06 PROCEDURE — 36415 COLL VENOUS BLD VENIPUNCTURE: CPT | Performed by: INTERNAL MEDICINE

## 2021-12-06 PROCEDURE — 73030 X-RAY EXAM OF SHOULDER: CPT | Mod: RT

## 2021-12-06 PROCEDURE — 258N000003 HC RX IP 258 OP 636: Performed by: INTERNAL MEDICINE

## 2021-12-06 PROCEDURE — 85027 COMPLETE CBC AUTOMATED: CPT | Performed by: INTERNAL MEDICINE

## 2021-12-06 RX ORDER — HYDROMORPHONE HYDROCHLORIDE 1 MG/ML
0.3 INJECTION, SOLUTION INTRAMUSCULAR; INTRAVENOUS; SUBCUTANEOUS
Status: DISCONTINUED | OUTPATIENT
Start: 2021-12-06 | End: 2021-12-06

## 2021-12-06 RX ORDER — LORAZEPAM 0.5 MG/1
0.25 TABLET ORAL EVERY 4 HOURS PRN
Status: DISCONTINUED | OUTPATIENT
Start: 2021-12-06 | End: 2021-12-09 | Stop reason: HOSPADM

## 2021-12-06 RX ORDER — COLCHICINE 0.6 MG/1
0.6 TABLET ORAL ONCE
Status: COMPLETED | OUTPATIENT
Start: 2021-12-06 | End: 2021-12-06

## 2021-12-06 RX ORDER — HYDROMORPHONE HYDROCHLORIDE 1 MG/ML
0.3 INJECTION, SOLUTION INTRAMUSCULAR; INTRAVENOUS; SUBCUTANEOUS
Status: DISCONTINUED | OUTPATIENT
Start: 2021-12-06 | End: 2021-12-08

## 2021-12-06 RX ORDER — AMOXICILLIN 250 MG
1 CAPSULE ORAL AT BEDTIME
Status: DISCONTINUED | OUTPATIENT
Start: 2021-12-06 | End: 2021-12-09 | Stop reason: HOSPADM

## 2021-12-06 RX ORDER — ACETAMINOPHEN 500 MG
1000 TABLET ORAL 3 TIMES DAILY
Status: DISCONTINUED | OUTPATIENT
Start: 2021-12-06 | End: 2021-12-09 | Stop reason: HOSPADM

## 2021-12-06 RX ORDER — METHOCARBAMOL 500 MG/1
500 TABLET, FILM COATED ORAL 4 TIMES DAILY PRN
Status: DISCONTINUED | OUTPATIENT
Start: 2021-12-06 | End: 2021-12-08

## 2021-12-06 RX ADMIN — ALLOPURINOL 50 MG: 100 TABLET ORAL at 08:18

## 2021-12-06 RX ADMIN — FERROUS GLUCONATE 324 MG: 324 TABLET ORAL at 08:17

## 2021-12-06 RX ADMIN — TAMSULOSIN HYDROCHLORIDE 0.4 MG: 0.4 CAPSULE ORAL at 08:17

## 2021-12-06 RX ADMIN — COLCHICINE 0.6 MG: 0.6 TABLET, FILM COATED ORAL at 11:58

## 2021-12-06 RX ADMIN — FINASTERIDE 5 MG: 5 TABLET, FILM COATED ORAL at 08:18

## 2021-12-06 RX ADMIN — HEPARIN SODIUM 5000 UNITS: 5000 INJECTION INTRAVENOUS; SUBCUTANEOUS at 08:18

## 2021-12-06 RX ADMIN — HYDROMORPHONE HYDROCHLORIDE 0.3 MG: 1 INJECTION, SOLUTION INTRAMUSCULAR; INTRAVENOUS; SUBCUTANEOUS at 11:58

## 2021-12-06 RX ADMIN — LIDOCAINE 1 PATCH: 246 PATCH TOPICAL at 08:19

## 2021-12-06 RX ADMIN — HEPARIN SODIUM 5000 UNITS: 5000 INJECTION INTRAVENOUS; SUBCUTANEOUS at 20:44

## 2021-12-06 RX ADMIN — Medication 50 MG: at 01:56

## 2021-12-06 RX ADMIN — HYDROMORPHONE HYDROCHLORIDE 1 MG: 2 TABLET ORAL at 21:56

## 2021-12-06 RX ADMIN — AMLODIPINE BESYLATE 5 MG: 5 TABLET ORAL at 08:17

## 2021-12-06 RX ADMIN — Medication 50 MG: at 08:18

## 2021-12-06 RX ADMIN — SILVER SULFADIAZINE: 10 CREAM TOPICAL at 08:18

## 2021-12-06 RX ADMIN — ASPIRIN 81 MG: 81 TABLET, COATED ORAL at 08:18

## 2021-12-06 RX ADMIN — LORATADINE 10 MG: 10 TABLET ORAL at 21:56

## 2021-12-06 RX ADMIN — SODIUM CHLORIDE, POTASSIUM CHLORIDE, SODIUM LACTATE AND CALCIUM CHLORIDE: 600; 310; 30; 20 INJECTION, SOLUTION INTRAVENOUS at 11:24

## 2021-12-06 RX ADMIN — SENNOSIDES AND DOCUSATE SODIUM 1 TABLET: 50; 8.6 TABLET ORAL at 21:56

## 2021-12-06 RX ADMIN — ISOSORBIDE MONONITRATE 30 MG: 30 TABLET, EXTENDED RELEASE ORAL at 08:18

## 2021-12-06 RX ADMIN — ATORVASTATIN CALCIUM 40 MG: 40 TABLET, FILM COATED ORAL at 08:17

## 2021-12-06 RX ADMIN — HYDROMORPHONE HYDROCHLORIDE 1 MG: 2 TABLET ORAL at 18:21

## 2021-12-06 RX ADMIN — METHOCARBAMOL 500 MG: 500 TABLET ORAL at 20:51

## 2021-12-06 RX ADMIN — OXYCODONE HYDROCHLORIDE 5 MG: 5 TABLET ORAL at 12:49

## 2021-12-06 RX ADMIN — INSULIN ASPART 1 UNITS: 100 INJECTION, SOLUTION INTRAVENOUS; SUBCUTANEOUS at 18:23

## 2021-12-06 RX ADMIN — Medication 1 MG: at 21:56

## 2021-12-06 RX ADMIN — SODIUM CHLORIDE, POTASSIUM CHLORIDE, SODIUM LACTATE AND CALCIUM CHLORIDE: 600; 310; 30; 20 INJECTION, SOLUTION INTRAVENOUS at 21:56

## 2021-12-06 RX ADMIN — ACETAMINOPHEN 1000 MG: 500 TABLET, FILM COATED ORAL at 19:19

## 2021-12-06 RX ADMIN — LORAZEPAM 0.25 MG: 0.5 TABLET ORAL at 12:49

## 2021-12-06 RX ADMIN — CYCLOBENZAPRINE HYDROCHLORIDE 7.5 MG: 5 TABLET, FILM COATED ORAL at 08:17

## 2021-12-06 ASSESSMENT — ACTIVITIES OF DAILY LIVING (ADL)
ADLS_ACUITY_SCORE: 15
ADLS_ACUITY_SCORE: 9
ADLS_ACUITY_SCORE: 15
ADLS_ACUITY_SCORE: 9
ADLS_ACUITY_SCORE: 15
ADLS_ACUITY_SCORE: 9
ADLS_ACUITY_SCORE: 15
ADLS_ACUITY_SCORE: 9
ADLS_ACUITY_SCORE: 9
ADLS_ACUITY_SCORE: 15
ADLS_ACUITY_SCORE: 9
ADLS_ACUITY_SCORE: 9
ADLS_ACUITY_SCORE: 15
ADLS_ACUITY_SCORE: 15

## 2021-12-06 NOTE — CONSULTS
"CLINICAL NUTRITION SERVICES  -  ASSESSMENT NOTE      MALNUTRITION:  % Weight Loss: Unable to determine, suspect admit wt inaccurate  % Intake:  Decreased intake does not meet criteria for malnutrition   Subcutaneous Fat Loss:  Upper arm region mild to moderate depletion --> not using as indicator with only 1 region present  Muscle Loss:  Temporal region mild depletion, Clavicle bone region mild to moderate depletion and Acromion bone region mild to moderate depletion  Fluid Retention: None noted or documented    Malnutrition Diagnosis: Unable to determine due to lack of accurate wt trends        REASON FOR ASSESSMENT  Lance Ibrahim is a 76 year old male seen by Registered Dietitian for Admission Nutrition Risk Screen for positive.    PMH of: CVA, CAD, DMII, CKD stage 3, gout.    Admit 2/2: Back pain.      NUTRITION HISTORY  - Information obtained from patient and chart.  - Diet at home: Regular.  - Usual intakes: Meals TID.  - Barriers to PO intakes: None PTA.  Notes he was eating at baseline.  - Use of oral supplements: Has heard of Ensure, has not trialed.  - Chewing/swallowing issues: Denied.  - Allergies: NKFA.      CURRENT NUTRITION ORDERS  Diet Order:     Regular    Current Intake/Tolerance:  % intakes per flowsheet review, limited timeframe.  Patient himself reports he's just not feeling hungry which is new in the hospital and wasn't occurring PTA per his report.  He denies nausea or pain.  Is not interested in oral supplements.      NUTRITION FOCUSED PHYSICAL ASSESSMENT FOR DIAGNOSING MALNUTRITION)  Yes    Obtained from Chart/Interdisciplinary Team:  - No documentation of PI  - Stooling patterns reviewed    ANTHROPOMETRICS  Height: 6' 0\"  Weight: 232 lbs 14.4 oz  Body mass index is 31.59 kg/m .  Weight Status:  Obesity Grade I BMI 30-34.9  Weight History:  Wt Readings from Last 10 Encounters:   12/03/21 105.6 kg (232 lb 14.4 oz)   11/10/21 113.9 kg (251 lb)   10/05/21 113.9 kg (251 lb)   09/29/21 114 kg " (251 lb 6.4 oz)   08/11/21 112.9 kg (249 lb)   07/28/21 114.1 kg (251 lb 9.6 oz)   04/29/21 117.3 kg (258 lb 9.6 oz)   12/21/20 117.9 kg (260 lb)   12/20/20 117.9 kg (260 lb)   10/13/20 119 kg (262 lb 6.4 oz)     - Only 1 admit wt for comparison.  Also w/ wt of 250# on 11/24/2021.  Suspect wt of 232# is inaccurate or reported.  Patient himself states he was weighing 250# at home before admit and reported wt loss because he was told he weighed 240# upon admit.      LABS  Labs reviewed:  Electrolytes  Potassium (mmol/L)   Date Value   12/06/2021 4.0   12/05/2021 4.3   12/04/2021 4.4   04/29/2021 4.7   07/28/2020 4.5   07/27/2020 3.9    Blood Glucose  Glucose (mg/dL)   Date Value   12/06/2021 181 (H)   12/05/2021 148 (H)   12/04/2021 211 (H)   12/03/2021 142 (H)   09/29/2021 179 (H)   04/29/2021 212 (H)   07/28/2020 138 (H)   07/27/2020 127 (H)   02/28/2020 127 (H)   02/21/2020 122 (H)     GLUCOSE BY METER POCT (mg/dL)   Date Value   12/06/2021 143 (H)   12/06/2021 153 (H)   12/05/2021 179 (H)   12/05/2021 126 (H)   12/05/2021 183 (H)     Hemoglobin A1C (%)   Date Value   11/01/2021 7.5 (H)   04/29/2021 8.1 (H)   07/27/2020 7.1 (H)   02/19/2020 7.7 (H)   03/25/2019 6.3 (H)   10/17/2018 6.4 (H)    Inflammatory Markers  CRP Inflammation (mg/L)   Date Value   07/28/2021 84.2 (H)   07/28/2020 48.3 (H)   11/28/2019 4.4   06/19/2018 102.0 (H)     WBC (10e9/L)   Date Value   04/29/2021 8.5   07/28/2020 7.4   07/27/2020 10.4     WBC Count (10e3/uL)   Date Value   12/06/2021 14.0 (H)   12/05/2021 11.4 (H)   12/04/2021 13.2 (H)     Albumin (g/dL)   Date Value   07/28/2020 2.8 (L)   01/24/2020 3.6   05/01/2018 3.2 (L)      Magnesium (mg/dL)   Date Value   01/24/2020 1.9   10/20/2018 2.0   05/01/2018 1.8     Sodium (mmol/L)   Date Value   12/06/2021 135   12/05/2021 136   12/04/2021 137   04/29/2021 139   07/28/2020 137   07/27/2020 137    Renal  Urea Nitrogen (mg/dL)   Date Value   12/06/2021 19   12/05/2021 29   12/04/2021 46  (H)   04/29/2021 27   07/28/2020 20   07/27/2020 26     Creatinine (mg/dL)   Date Value   12/06/2021 1.45 (H)   12/05/2021 1.66 (H)   12/04/2021 2.31 (H)   04/29/2021 1.19   07/28/2020 1.19   07/27/2020 1.30 (H)     Additional  Triglycerides (mg/dL)   Date Value   09/29/2021 83   04/29/2021 153 (H)   06/25/2019 65   10/07/2017 68     Ketones Urine (mg/dL)   Date Value   12/03/2021 Negative   01/24/2020 15 (A)        B/P: 139/78, T: 97.6, P: 83, R: 16      MEDICATIONS  Medications reviewed:    allopurinol  50 mg Oral Daily     amLODIPine  5 mg Oral Daily     aspirin  81 mg Oral Daily     atorvastatin  40 mg Oral Daily     ferrous gluconate  324 mg Oral Daily     finasteride  5 mg Oral Daily     fluticasone  1 spray Both Nostrils At Bedtime     heparin ANTICOAGULANT  5,000 Units Subcutaneous Q12H     insulin aspart  1-7 Units Subcutaneous TID AC     insulin aspart  1-5 Units Subcutaneous At Bedtime     isosorbide mononitrate  30 mg Oral Daily     lidocaine  1 patch Transdermal Q24H     lidocaine  1 patch Transdermal Q24H     lidocaine   Transdermal Q8H     lidocaine   Transdermal Q8H     loratadine  10 mg Oral At Bedtime     silver sulfADIAZINE   Topical Daily     sodium chloride (PF)  3 mL Intracatheter Q8H     tamsulosin  0.4 mg Oral Daily        lactated ringers 100 mL/hr at 12/06/21 1124          ASSESSED NUTRITION NEEDS PER APPROVED PRACTICE GUIDELINES:    Dosing Weight 114 kg - accuracy?  Estimated Energy Needs: 20-25 Kcal/Kg  Justification: maintenance  Estimated Protein Needs: 1-1.2 g pro/Kg  Justification: preservation of lean body mass  Estimated Fluid Needs: per MD      NUTRITION DIAGNOSIS:  Inadequate oral intake related to acutely decreased appetite as evidenced by meeting <75% needs during admit.    NUTRITION INTERVENTIONS  Recommendations / Nutrition Prescription  Continue regular diet.  Encouraged any PO intakes as able.     Ok for prn Ensure.  Patient himself declined and notes he is not fond of any  supplements right now.    Reweigh as able to confirm accuracy of admit wt (ordered).       Implementation  Nutrition education: Provided education on above.    Medical Food Supplement: As above.    Nutrition Goals  Patient to consume at least 50-75% of meals or supplements TID.        MONITORING AND EVALUATION:  Progress towards goals will be monitored and evaluated per protocol and Practice Guidelines          Maria Fernanda Serna RDN, LD  Clinical Dietitian  3rd floor/ICU: [phone]  All other floors: [phone]  Weekend/holiday: [phone]

## 2021-12-06 NOTE — PLAN OF CARE
"Vital Signs: WDL, pt is on RA  Pain/Comfort: rates pain 10/10 R) shoulder, even with light touch. Pain management team to see pt today. IV dilaudid given & tramadol.   Diet/po intake: ate breakfast this am, but \"not interested in anything\" for lunch, states pain decreases appetite.   Output: voiding using urinal, no BM today.   Activity/Ambulation: up to chair for ~10 mins today, requesting back to bed before nurse left room d/t pain.   Pertinent assessments: R) hand edema/redness, R) shoulder pain.   Major Shift Events: ativan given for pain & anxiety, helped w/ pain & rest.   Plan (Upcoming Events): pain management to see pt, monitor VS & BG. Pain meds prn.   Discharge/Transfer Needs: PT recs home w/ 24hr care vs TCU.     Will continue with POC.       "

## 2021-12-06 NOTE — PLAN OF CARE
PT A/OX4, Used walker this shift to ambulate to the commode twice this shift. C/O right shoulder and back pain, PRN tramadol given x2 and PRN flexeril given this shift with some relief. Elevated temp this shift due to heating pad once removed temp went back to normal. PT/OT following. Will continue current POC.

## 2021-12-06 NOTE — CONSULTS
[facility]  Pain Service Consultation   Text Page    Date of Admission:  12/3/2021    Assessment & Plan   Lance Ibrahim is a 76 year old male who was admitted on 12/3/2021. I was asked by Hospitalist Deepika Lagos MD to see the patient for acute on chronic back pain.    PLAN:   1)  Opioids:  Dilaudid 1 mg every 3 hours prn  Opioids Treatment Goal:   -Improvement in function  -Participate in PT    2)  Non-opioid multimodal medication therapy  -Topical:Voltaren 1% Topical Gel to low back, Lidocaine Patch 4% to right shoulder  -N-SAIDS:Avoid due to CKD  -Muscle Relaxants:Methocarbamol 500 mg QID prn  -Adjuvants:Acetaminophen 1000 TID  -Antidepressants/anxiolytics:  Lorazepam 0.25 mg prn for anxiety    3)  Non-medication interventions  Positioning, Heating pad PRN, Relaxation, Meditation, Physical therapy   Appreciate input of ANDREW Colon as the patient's wife  12/3/2021 and his  from [employer] [facility] visited him that day    4)  Constipation Prophylaxis  Start senna-docusate at bedtime and as needed and Polyethylene Glycol daily prn    5) Medication Risk reduction strategies   - Monitor for sedation   - Capnography per protocol   - Narcan for opioid reversal     6)  Pain Education  -Opioid safe use, storage and disposal information included in DC AVS    7)  DC Planning   Discussed goal of Opioid therapy as above with patient, bedside nurse Barbara Bo RN and Hospitalist Deepika Lagos MD  Length of therapy is less than 10 days, opioids may be stopped without taper.  Continued outpatient management of pain per PCP  Disposition: TCU  Support systems: Omari Pacheco  Outpatient Referrals: Consider follow up with PCP to arrange for pain clinic follow up due to chronic back pain  The following risk factors have been identified for unintentional overdose: patient is on multiple sedating medications , patient is > 65 years old, patient is  overweight, patient has anxiety, depression or PTSD and patient has been switched to a new opioid medication. Discharge with intra-nasal naloxone if discharged to home with opioids  >40 mg MME/day.  Plan for education prior to discharge.    ASSESSMENT  1)  Acute on chronic back pain  12/3/2021 CT Lumbar spine L5-S1 severe right and moderate left neural foraminal  narrowing.    2)  Patient with chronic back pain, and  Has been on intermittent opioid therapy during the past year.   Baseline 0 mg Daily Morphine Equivalent as dispensed and as reported daily use.  Patient has a possible opioid tolerance.     Patient's opioid use in past 24 hours: 150 mg Tramadol and 5 mg Oxycodone and 0.3 mg IV Dilaudid = 15 mg Daily Morphine Equivalent    3)  Risk factors for opioid related harms  - Concurrent benzodiazepine use  - Sleep disordered breathing  - Renal insufficiency  - Age > 65 years old  - Anxiety/depression    4)  Opioid induced side-effects:  -Constipation - Yes, with no stool since admission  -Nausea/Vomit - No/denies  -Sedation - Yes, due to today's Ativan dosing  -Urinary Retention No/denies    5)  Other/Related:    -Osteoarthritis  -Sleep Apnea - does not use CPAP  -History TIA     Time Spent on this Encounter   Total unit/floor time 2:25 PM until 3:10 PM minutes, time consisted of the following, examination of the patient, reviewing the record and completing documentation. >50% of time spent in counseling and coordination of care.  Time spend counseling with patient consisted of the following topics, symptom management.  Time spent in coordination of care with Hospitalist Deepika Lagos MD.     Monique Li MS, RN, CNS, APRN, ACHPN, FAACVPR  Pain and Palliative Care  Pager [phone]  Office [phone]     Primary Care Physician   Primary Care Physician:Anh Spivey  Pain Specialist: None    Chief Complaint   Back Pain    History is obtained from the patient and electronic health record    History  of Present Illness   Lance Ibrahim is a 76 year old male who presents with acute on chronic back pain    CURRENT PAIN:  His pain is located in the right shoulder and fingers  It is described as Tender, Throbbing and Unbearable  He rates it as ranging between 8/10 and 10/10  The average is 10/10 on a scale of 0-10  Currently it is rated as 10/10  It improves by - Noting has helped  It worsens by - anything touching it  He has been compliant with the recommendations while in the hospital.      PAIN HISTORY:  The pain is mainly located in the low back  It is described as Aching and Nagging  Rates it as ranging between 0/10 and 8/10  Currently it is rated as 4/10  It improves by - bending over  It worsens by - moving too much  He has been compliant with the recommendations while in the hospital.      PAST PAIN TREATMENT:   Medications:Norco, Percocet  Non-pharmacologic modalities:Physical Therapy  Previous interventions/surgeries:    [place] Board of Pharmacy Data Base Reviewed:    YES; As expected, no concern for misuse/abuse of controlled medications based on this report.  OPIOID RISK [id number]      Past Medical History   I have reviewed this patient's medical history and updated it with pertinent information if needed.   Past Medical History:   Diagnosis Date     Arthritis     osteoarthritis knees     BPH      CAD (coronary artery disease)     subtotal occlusion in the small distal LAD      Cardiomyopathy      Cerebral artery occlusion with cerebral infarction     TIA 1993, no residual     Chronic kidney disease      Chronic rhinitis      HTN (hypertension)      Hyperlipidemia      Kidney stone      PVD (peripheral vascular disease)      Sleep apnea     doesn't use cpap     TIA (transient ischaemic attack) 1993     Type 2 diabetes mellitus - 11/08/2018     Ureteral stone        Past Surgical History   I have reviewed this patient's surgical history and updated it with pertinent information if needed.  Past Surgical  History:   Procedure Laterality Date     AMPUTATE TOE(S) Left 10/15/2018    Procedure: AMPUTATE TOE(S);  Left third toe amputation;  Surgeon: Ayaka Azevedo DPM, Podiatry/Foot and Ankle Surgery;  Location: [facility] OR     ARTHROPLASTY KNEE Right 12/07/2018    Procedure: Right total knee arthroplasty;  Surgeon: Issa Cunha MD;  Location: [facility] OR     COLONOSCOPY  03/09/2013    Procedure: COLONOSCOPY;  COLONOSCOPY;  Surgeon: Chau Hogan MD;  Location:  GI     CV HEART CATHETERIZATION WITH POSSIBLE INTERVENTION Left 03/05/2019    Procedure: Coronary Angiogram;  Surgeon: Nas Linda MD;  Location:  HEART CARDIAC CATH LAB     Diastasis recti repair  1985     EXTRACAPSULAR CATARACT EXTRATION WITH INTRAOCULAR LENS IMPLANT Left 03/13/2017     EXTRACAPSULAR CATARACT EXTRATION WITH INTRAOCULAR LENS IMPLANT Right      FOOT SURGERY  04/2013    cyst removal      HERNIA REPAIR  07/13/2004    ventral      ORIF Shoulder Left      Ventral hernia repair NOS  1987         Prior to Admission Medications   Prior to Admission Medications   Prescriptions Last Dose Informant Patient Reported? Taking?   Ferrous Gluconate 240 (27 Fe) MG TABS 12/3/2021 at Unknown time  Yes Yes   Sig: Take 1 tablet by mouth daily    Multiple Vitamins-Minerals (MULTIVITAMIN ADULTS PO) 12/3/2021 at Unknown time  Yes Yes   Sig: Take 1 tablet by mouth daily    ONETOUCH ULTRA test strip   No No   Sig: TEST DAILY OR AS DIRECTED   allopurinol (ZYLOPRIM) 100 MG tablet 12/3/2021 at Unknown time  No Yes   Sig: Take 1 tablet (100 mg) by mouth daily   amLODIPine (NORVASC) 5 MG tablet 12/3/2021 at Unknown time  No Yes   Sig: TAKE 1 TABLET BY MOUTH EVERY DAY   aspirin (ASA) 81 MG EC tablet 12/3/2021 at Unknown time  No Yes   Sig: Take 1 tablet (81 mg) by mouth daily   atorvastatin (LIPITOR) 40 MG tablet 12/3/2021 at Unknown time  No Yes   Sig: Take 1 tablet (40 mg) by mouth daily   finasteride (PROSCAR) 5 MG tablet 12/3/2021 at Unknown time  Yes Yes   Sig:  "Take 5 mg by mouth daily.   fluticasone (FLONASE) 50 MCG/ACT spray 2021 at Unknown time  Yes Yes   Sig: Spray 1 spray into both nostrils At Bedtime    isosorbide mononitrate (IMDUR) 30 MG 24 hr tablet 12/3/2021 at Unknown time  No Yes   Sig: Take 1 tablet (30 mg) by mouth daily   labetalol (NORMODYNE) 200 MG tablet 12/3/2021 at x1  No Yes   Sig: TAKE 1 TABLET BY MOUTH TWICE A DAY   lisinopril (ZESTRIL) 10 MG tablet 12/3/2021 at Unknown time  No Yes   Sig: TAKE 1 TABLET BY MOUTH EVERY DAY   loratadine (CLARITIN) 10 MG tablet 2021 at Unknown time  Yes Yes   Sig: Take 10 mg by mouth At Bedtime    metFORMIN (GLUCOPHAGE) 1000 MG tablet 12/3/2021 at x1  No Yes   Sig: TAKE 1 TABLET BY MOUTH TWICE A DAY WITH MEALS   silver sulfADIAZINE (SILVADENE) 1 % external cream 12/3/2021 at Unknown time  Yes Yes   Sig: Apply topically daily To bilateral foot wounds   tamsulosin (FLOMAX) 0.4 MG 24 hr capsule 12/3/2021 at Unknown time  Yes Yes   Sig: Take 1 capsule by mouth daily.   torsemide (DEMADEX) 20 MG tablet 12/3/2021 at Unknown time  No Yes   Sig: TAKE 1 TABLET BY MOUTH EVERY DAY      Facility-Administered Medications: None     Allergies   Allergies   Allergen Reactions     Penicillins Anaphylaxis     \"anaphylactic\"     Sulfa Drugs      \"itchy rash, swelling of face and hands\"     Ancef [Cefazolin] Rash       Social History   I have reviewed this patient's social history and updated it with pertinent information if needed. Lance MORALES Manuelsantos  reports that he quit smoking about 28 years ago. He smoked 0.00 packs per day. He has never used smokeless tobacco. He reports previous alcohol use. He reports that he does not use drugs.   Wife  on 12/3/2021     Family History   I have reviewed this patient's family history and updated it with pertinent information if needed.   Family History   Problem Relation Age of Onset     Family History Negative Mother          88 yo     Cancer Father          76 yo brain     " Diabetes Maternal Grandfather          89 yo     Alcohol/Drug Paternal Grandfather              Colon Cancer No family hx of      Family history of addiction - No    Review of Systems   The 10 point Review of Systems is negative other than noted in the HPI or here.    Denies Bowel or bladder dysfunction    Physical Exam   Temp:  [97.6  F (36.4  C)-101  F (38.3  C)] 97.6  F (36.4  C)  Pulse:  [83-95] 83  Resp:  [16-18] 16  BP: (139-161)/(72-84) 139/78  SpO2:  [92 %-94 %] 94 %  232 lbs 14.4 oz  GEN:  Drowsy elderly  male, oriented x 3, appears comfortable, no apparent distress (but  Recently had first dose of Ativan).  HEENT:  Normocephalic/atraumatic, no scleral icterus, no nasal discharge, mouth moist.  CV:  RRR, S1, S2; no murmurs or other irregularities noted.  +3 DP/PT pulses bilaterally; no edema bilateral lower extremities.  RESP:  Clear to auscultation bilaterally without rales/rhonchi/wheezing/retractions.  Symmetric chest rise on inhalation noted.  Normal respiratory effort.  ABD:  Rounded, soft, non-tender/non-distended.  +BS  EXT:  Edema & pulses as noted above.  Color, moisture and sensation intact x 4.     M/S:   Right shoulder is reddened and tender to palpation.    SKIN:  Warm and dry to touch, no exanthems noted in the visualized areas.    NEURO: Symmetric strength +5/5.  Sensation to touch intact all extremities.   There is no area of allodynia or hyperesthesia.  PAIN BEHAVIOR: Cooperative  Psych:  Normal affect.  Calm, cooperative, conversant appropriately.       Data   Results for orders placed or performed during the hospital encounter of 21   Abd/pelvis CT no contrast - Stone Protocol     Status: None    Narrative    CT ABDOMEN PELVIS W/O CONTRAST 12/3/2021 2:47 PM    CLINICAL HISTORY: right flank pain  TECHNIQUE: CT scan of the abdomen and pelvis was performed without IV  contrast. Multiplanar reformats were obtained. Dose reduction  techniques were  used.  CONTRAST: None.    COMPARISON: 1/24/2020    FINDINGS:   LOWER CHEST: Normal.    HEPATOBILIARY: Hepatic steatosis. No calcified gallstones or biliary  ductal dilatation.    PANCREAS: Normal.    SPLEEN: Normal.    ADRENAL GLANDS: Stable 1.8 cm left adrenal adenoma.    KIDNEYS/BLADDER: No urinary system calculi, hydroureteronephrosis or  perinephric stranding. Simple cyst at the upper pole of the left  kidney requiring no specific follow-up, stable. Decompressed urinary  bladder.    BOWEL: Sigmoid diverticulosis. No small bowel or colonic obstruction  or inflammatory changes. Normal appendix.    LYMPH NODES: Several small retroperitoneal and bilateral iliac lymph  nodes are stable. For example a right common iliac 8 mm lymph node  (series 8, image 25), left common iliac and millimeter lymph node  (series 8, image 14) and bilateral distal external iliac prominent  lymph nodes are unchanged. No new or enlarging lymph nodes.    VASCULATURE: No abdominal aortic aneurysm.    PELVIC ORGANS: Mild intraprostatic calcifications. Stable  fat-containing moderate-sized right inguinal hernia.    OTHER: No free fluid or fluid collections. Ventral abdominal mesh. No  free air.    MUSCULOSKELETAL: Mild degenerative changes in the spine. No suspicious  lesions in the bones.      Impression    IMPRESSION:   1.  No acute findings in the abdomen and pelvis. No urinary system  calculi.  2.  Hepatic steatosis.  3.  Stable left adrenal benign adenoma.  4.  Colonic diverticulosis.  5.  Stable fat-containing right inguinal hernia.    KENDRA LOPEZ MD         SYSTEM ID:  [id number]   Lumbar spine CT w/o contrast     Status: None    Narrative    CT LUMBAR SPINE WITHOUT CONTRAST  12/3/2021 2:46 PM     HISTORY: Back pain.      TECHNIQUE: Axial images of the lumbar spine were obtained without  intravenous contrast. Multiplanar reformations were performed.   Radiation dose for this scan was reduced using automated exposure  control, adjustment  of the mA and/or kV according to patient size, or  iterative reconstruction technique.     COMPARISON: None.     FINDINGS:  There are 5 lumbar-type vertebral bodies assumed for the  purposes of this dictation.     Lumbar spine alignment is within normal limits. No loss of vertebral  body height. No evidence of fracture. No destructive bony lesion  appreciated.    Level by level as follows:     T12-L1: No loss of disc height. No significant disc herniation. Normal  facets. No spinal canal or neural foraminal narrowing.     L1-L2: No loss of disc height. No significant disc herniation.  Anterior osteophytic spurring. Normal facets. No spinal canal or  neural foraminal narrowing.     L2-L3: No loss of disc height. No significant disc herniation. Marked  anterior osteophytic spurring. Normal facets. No spinal canal or  neural foraminal narrowing.     L3-L4: Mild loss of disc height. Circumferential disc bulge with mild  endplate osteophytic spurring. Mild facet hypertrophy. No significant  spinal canal narrowing. Mild bilateral neural foraminal narrowing.     L4-L5: Moderate loss of disc height. Circumferential disc bulge with  endplate osteophytic spurring. Mild facet hypertrophy. Mild spinal  canal narrowing. Moderate right neural foraminal narrowing. Moderate  left neural foraminal narrowing.     L5-S1: Marked loss of disc height with degenerative endplate changes.  Circumferential disc bulge with prominent endplate osteophytic  spurring. Moderate bilateral facet hypertrophy. Mild spinal canal  narrowing with narrowing of the lateral recesses bilaterally. Severe  right neural foraminal narrowing. Moderate left neural foraminal  narrowing.     Visualized paraspinous tissues: Unremarkable.       Impression    IMPRESSION:    1. No evidence of acute fracture or subluxation in the lumbar spine.  2. Degenerative changes in the lower lumbar spine as detailed above,  most pronounced at L5-S1.  3. At L5-S1, there is mild  spinal canal narrowing with narrowing of  lateral recesses. Severe right and moderate left neural foraminal  narrowing.  4. At L4-L5, there is mild spinal canal narrowing as well as moderate  bilateral neural foraminal narrowing.     SULMA BENEDICT MD         SYSTEM ID:  [id number]   XR Shoulder Right G/E 3vw     Status: None    Narrative    XR SHOULDER RIGHT G/E 3 VIEWS  12/6/2021 12:32 PM     HISTORY: right shoulder pain    COMPARISON: None      Impression    IMPRESSION: No acute fracture or malalignment. Mild glenohumeral and  acromioclavicular joint degenerative changes. Calcification adjacent  to the greater tuberosity suggests calcific tendinopathy. Osteopenia.  Chronic lung changes.    KEN WEEKS MD         SYSTEM ID:  [id number]   Basic metabolic panel     Status: Abnormal   Result Value Ref Range    Sodium 137 133 - 144 mmol/L    Potassium 4.3 3.4 - 5.3 mmol/L    Chloride 105 94 - 109 mmol/L    Carbon Dioxide (CO2) 23 20 - 32 mmol/L    Anion Gap 9 3 - 14 mmol/L    Urea Nitrogen 60 (H) 7 - 30 mg/dL    Creatinine 3.38 (H) 0.66 - 1.25 mg/dL    Calcium 9.6 8.5 - 10.1 mg/dL    Glucose 142 (H) 70 - 99 mg/dL    GFR Estimate 17 (L) >60 mL/min/1.73m2   UA with Microscopic reflex to Culture     Status: Abnormal    Specimen: Urine, Clean Catch   Result Value Ref Range    Color Urine Yellow Colorless, Straw, Light Yellow, Yellow    Appearance Urine Slightly Cloudy (A) Clear    Glucose Urine Negative Negative mg/dL    Bilirubin Urine Negative Negative    Ketones Urine Negative Negative mg/dL    Specific Gravity Urine 1.019 1.003 - 1.035    Blood Urine Negative Negative    pH Urine 5.0 5.0 - 7.0    Protein Albumin Urine 30  (A) Negative mg/dL    Urobilinogen Urine Normal Normal, 2.0 mg/dL    Nitrite Urine Negative Negative    Leukocyte Esterase Urine Negative Negative    Bacteria Urine Few (A) None Seen /HPF    Mucus Urine Present (A) None Seen /LPF    RBC Urine 2 <=2 /HPF    WBC Urine 5 <=5 /HPF    Squamous  Epithelials Urine 2 (H) <=1 /HPF    Hyaline Casts Urine 3 (H) <=2 /LPF    Narrative    Urine Culture not indicated   CBC with platelets and differential     Status: Abnormal   Result Value Ref Range    WBC Count 14.3 (H) 4.0 - 11.0 10e3/uL    RBC Count 3.93 (L) 4.40 - 5.90 10e6/uL    Hemoglobin 11.7 (L) 13.3 - 17.7 g/dL    Hematocrit 37.3 (L) 40.0 - 53.0 %    MCV 95 78 - 100 fL    MCH 29.8 26.5 - 33.0 pg    MCHC 31.4 (L) 31.5 - 36.5 g/dL    RDW 15.8 (H) 10.0 - 15.0 %    Platelet Count 350 150 - 450 10e3/uL    % Neutrophils 69 %    % Lymphocytes 15 %    % Monocytes 9 %    % Eosinophils 2 %    % Basophils 1 %    % Immature Granulocytes 4 %    NRBCs per 100 WBC 0 <1 /100    Absolute Neutrophils 9.9 (H) 1.6 - 8.3 10e3/uL    Absolute Lymphocytes 2.2 0.8 - 5.3 10e3/uL    Absolute Monocytes 1.3 0.0 - 1.3 10e3/uL    Absolute Eosinophils 0.2 0.0 - 0.7 10e3/uL    Absolute Basophils 0.1 0.0 - 0.2 10e3/uL    Absolute Immature Granulocytes 0.6 (H) <=0.4 10e3/uL    Absolute NRBCs 0.0 10e3/uL   Creatinine POCT     Status: Abnormal   Result Value Ref Range    Creatinine POCT 3.6 (H) 0.7 - 1.3 mg/dL    GFR, ESTIMATED POCT 15 (L) >60 mL/min/1.73m2   Asymptomatic COVID-19 Virus (Coronavirus) by PCR Nasopharyngeal     Status: Normal    Specimen: Nasopharyngeal; Swab   Result Value Ref Range    SARS CoV2 PCR Negative Negative    Narrative    Testing was performed using the ismael  SARS-CoV-2 & Influenza A/B Assay on the [employer]el  [employer]  System.  This test should be ordered for the detection of SARS-COV-2 in individuals who meet SARS-CoV-2 clinical and/or epidemiological criteria. Test performance is unknown in asymptomatic patients.  This test is for in vitro diagnostic use under the FDA EUA for laboratories certified under CLIA to perform moderate and/or high complexity testing. This test has not been FDA cleared or approved.  A negative test does not rule out the presence of PCR inhibitors in the specimen or target RNA in concentration  below the limit of detection for the assay. The possibility of a false negative should be considered if the patient's recent exposure or clinical presentation suggests COVID-19.  [facility] are certified under the Clinical Laboratory Improvement Amendments of 1988 (CLIA-88) as qualified to perform moderate and/or high complexity laboratory testing.   Glucose by meter     Status: Abnormal   Result Value Ref Range    GLUCOSE BY METER POCT 157 (H) 70 - 99 mg/dL   Basic metabolic panel     Status: Abnormal   Result Value Ref Range    Sodium 137 133 - 144 mmol/L    Potassium 4.4 3.4 - 5.3 mmol/L    Chloride 105 94 - 109 mmol/L    Carbon Dioxide (CO2) 27 20 - 32 mmol/L    Anion Gap 5 3 - 14 mmol/L    Urea Nitrogen 46 (H) 7 - 30 mg/dL    Creatinine 2.31 (H) 0.66 - 1.25 mg/dL    Calcium 9.5 8.5 - 10.1 mg/dL    Glucose 211 (H) 70 - 99 mg/dL    GFR Estimate 26 (L) >60 mL/min/1.73m2   CBC with platelets     Status: Abnormal   Result Value Ref Range    WBC Count 13.2 (H) 4.0 - 11.0 10e3/uL    RBC Count 4.05 (L) 4.40 - 5.90 10e6/uL    Hemoglobin 11.7 (L) 13.3 - 17.7 g/dL    Hematocrit 38.6 (L) 40.0 - 53.0 %    MCV 95 78 - 100 fL    MCH 28.9 26.5 - 33.0 pg    MCHC 30.3 (L) 31.5 - 36.5 g/dL    RDW 15.5 (H) 10.0 - 15.0 %    Platelet Count 349 150 - 450 10e3/uL   Glucose by meter     Status: Abnormal   Result Value Ref Range    GLUCOSE BY METER POCT 168 (H) 70 - 99 mg/dL   Glucose by meter     Status: Abnormal   Result Value Ref Range    GLUCOSE BY METER POCT 149 (H) 70 - 99 mg/dL   Glucose by meter     Status: Abnormal   Result Value Ref Range    GLUCOSE BY METER POCT 184 (H) 70 - 99 mg/dL   Glucose by meter     Status: Abnormal   Result Value Ref Range    GLUCOSE BY METER POCT 181 (H) 70 - 99 mg/dL   CBC with platelets     Status: Abnormal   Result Value Ref Range    WBC Count 11.4 (H) 4.0 - 11.0 10e3/uL    RBC Count 3.67 (L) 4.40 - 5.90 10e6/uL    Hemoglobin 10.7 (L) 13.3 - 17.7 g/dL    Hematocrit 34.3 (L)  40.0 - 53.0 %    MCV 94 78 - 100 fL    MCH 29.2 26.5 - 33.0 pg    MCHC 31.2 (L) 31.5 - 36.5 g/dL    RDW 15.4 (H) 10.0 - 15.0 %    Platelet Count 315 150 - 450 10e3/uL   Basic metabolic panel     Status: Abnormal   Result Value Ref Range    Sodium 136 133 - 144 mmol/L    Potassium 4.3 3.4 - 5.3 mmol/L    Chloride 106 94 - 109 mmol/L    Carbon Dioxide (CO2) 23 20 - 32 mmol/L    Anion Gap 7 3 - 14 mmol/L    Urea Nitrogen 29 7 - 30 mg/dL    Creatinine 1.66 (H) 0.66 - 1.25 mg/dL    Calcium 9.1 8.5 - 10.1 mg/dL    Glucose 148 (H) 70 - 99 mg/dL    GFR Estimate 39 (L) >60 mL/min/1.73m2   Glucose by meter     Status: Abnormal   Result Value Ref Range    GLUCOSE BY METER POCT 158 (H) 70 - 99 mg/dL   Glucose by meter     Status: Abnormal   Result Value Ref Range    GLUCOSE BY METER POCT 183 (H) 70 - 99 mg/dL   Glucose by meter     Status: Abnormal   Result Value Ref Range    GLUCOSE BY METER POCT 126 (H) 70 - 99 mg/dL   Glucose by meter     Status: Abnormal   Result Value Ref Range    GLUCOSE BY METER POCT 179 (H) 70 - 99 mg/dL   CBC with platelets     Status: Abnormal   Result Value Ref Range    WBC Count 14.0 (H) 4.0 - 11.0 10e3/uL    RBC Count 3.70 (L) 4.40 - 5.90 10e6/uL    Hemoglobin 10.8 (L) 13.3 - 17.7 g/dL    Hematocrit 34.4 (L) 40.0 - 53.0 %    MCV 93 78 - 100 fL    MCH 29.2 26.5 - 33.0 pg    MCHC 31.4 (L) 31.5 - 36.5 g/dL    RDW 15.1 (H) 10.0 - 15.0 %    Platelet Count 323 150 - 450 10e3/uL   Basic metabolic panel     Status: Abnormal   Result Value Ref Range    Sodium 135 133 - 144 mmol/L    Potassium 4.0 3.4 - 5.3 mmol/L    Chloride 103 94 - 109 mmol/L    Carbon Dioxide (CO2) 26 20 - 32 mmol/L    Anion Gap 6 3 - 14 mmol/L    Urea Nitrogen 19 7 - 30 mg/dL    Creatinine 1.45 (H) 0.66 - 1.25 mg/dL    Calcium 9.3 8.5 - 10.1 mg/dL    Glucose 181 (H) 70 - 99 mg/dL    GFR Estimate 46 (L) >60 mL/min/1.73m2   Glucose by meter     Status: Abnormal   Result Value Ref Range    GLUCOSE BY METER POCT 153 (H) 70 - 99 mg/dL    Glucose by meter     Status: Abnormal   Result Value Ref Range    GLUCOSE BY METER POCT 143 (H) 70 - 99 mg/dL   Creatinine POCT     Status: Abnormal   Result Value Ref Range    Creatinine 3.6 (A) 0.5 - 1.2 mg/dl    GFR 15    CBC with platelets differential     Status: Abnormal    Narrative    The following orders were created for panel order CBC with platelets differential.  Procedure                               Abnormality         Status                     ---------                               -----------         ------                     CBC with platelets and d...[[id number]]  Abnormal            Final result                 Please view results for these tests on the individual orders.

## 2021-12-06 NOTE — PROGRESS NOTES
[facility]    Hospitalist Progress Note  Provider : Deepika Lagos MD  Date of Service (when I saw the patient): 12/06/2021    Assessment & Plan    Lance Ibrahim is a 76 year old male with past medical history of hypertension, history of CVA in 1993, coronary artery disease, hyperlipidemia, obstructive sleep apnea, type 2 diabetes mellitus, chronic kidney disease stage IIIa who presented on 12/3/2021 with chief complaint of back pain. States that he has been taking Aleve for his back pain.  In the emergency department patient was found to have a temperature of 97.1  F, heart rate 89, blood pressure 102/56, respiratory rate 18, SPO2 100% on room air.  Lab work revealed a creatinine of 3.6 with a baseline of 1.1, leukocytosis of 14.3, hemoglobin 11.7.  The patient had a CT lumbar spine that showed no evidence of acute fracture, degenerative changes in the lumbar spine. The patient also had a CT abdomen and pelvis showing no acute findings, hepatic steatosis, stable left adrenal benign adenoma, colonic diverticulosis.    Dehydration  Acute Kidney Injury  Chronic Kidney Disease Stage 3a  - Baseline Cr = 1.1-1.3.  Initially creatinine elevated at 3.38.  Creatinine improved with IV fluids  -We will continue IV fluids.  -Avoid Nephrotoxins  -Encouraged PO intake  -Creatinine down to 1.45 today.  We will monitor BMP daily      Acute on Chronic Low Back Pain  Degenerative Disc Disease  -CT lumbar spine showed degenerative changes most pronounced at L5-S1 with severe right and moderate left neural foraminal narrowing  -PT/OT consulted  -Pain control    Gout flare with right hand pain and right shoulder pain  Patient continues to have severe right shoulder pain and right hand pain.  He stated that his symptoms are similar to his prior gout attacks.  Will order x-ray of the right shoulder to rule out acute process.  Patient stated that prednisone did not help much in the prior gout flares.   Colchicine works for him.  Will order colchicine 0.6 mg x 1 dose now.    -We will continue allopurinol    Hypertension  -Hold torsemide and lisinopril secondary to DEAN  -Will continue amlodipine.     Diabetes Mellitus Type 2  - A1C = 7.5 on 11/1/2021  - ISS  - Hold PTA metformin secondary to DEAN     BPH  - Post voids  -We will continue home tamsulosin and finasteride     Chronic Medical Problems:  Hx of CVA in 1993  Gout  Hyperlipidemia  Coronary Artery Disease    DVT Prophylaxis: Pneumatic Compression Devices  Code Status: Full Code    Disposition: Expected discharge: Anticipate more than 2 nights of hospital course    Deepika Lagos MD    Interval History   Patient seen and examined. He stated that he is having back pain.  He stated that his back pain has been going on for many years.  States that he was taking Aleve at home for his back pain.  Has no fever.  Denies nausea or vomiting.    -Data reviewed today: I reviewed all new labs and imaging results over the last 24 hours. I personally reviewed     Physical Exam   Temp: 97.6  F (36.4  C) Temp src: Oral BP: 139/78 Pulse: 83   Resp: 16 SpO2: 94 % O2 Device: None (Room air)    Vitals:    12/03/21 2028   Weight: 105.6 kg (232 lb 14.4 oz)     Vital Signs with Ranges  Temp:  [97.6  F (36.4  C)-101  F (38.3  C)] 97.6  F (36.4  C)  Pulse:  [83-95] 83  Resp:  [16-18] 16  BP: (139-161)/(72-84) 139/78  SpO2:  [92 %-94 %] 94 %  I/O last 3 completed shifts:  In: 2900 [P.O.:500; I.V.:2400]  Out: 1650 [Urine:1650]    GEN:  Alert, oriented x 3, appears comfortable, NAD.  HEENT:  Normocephalic/atraumatic, no scleral icterus, no nasal discharge, mouth moist.  CV:  Regular rate and rhythm, no murmur or JVD.  S1 + S2 noted, no S3 or S4.  LUNGS:  Clear to auscultation bilaterally without rales/rhonchi/wheezing/retractions.  Symmetric chest rise on inhalation noted.  ABD:  Active bowel sounds, soft, non-tender/non-distended.  No rebound/guarding/rigidity.  EXT:  No edema or  cyanosis.  Hands/feet warm to touch with good signs of peripheral perfusion.  No joint synovitis noted.  SKIN:  Dry to touch, no exanthems noted in the visualized areas.  NEURO:  Symmetric muscle strength, sensation to touch grossly intact.  No new focal deficits appreciated.    Medications     lactated ringers 100 mL/hr at 12/06/21 1124       allopurinol  50 mg Oral Daily     amLODIPine  5 mg Oral Daily     aspirin  81 mg Oral Daily     atorvastatin  40 mg Oral Daily     ferrous gluconate  324 mg Oral Daily     finasteride  5 mg Oral Daily     fluticasone  1 spray Both Nostrils At Bedtime     heparin ANTICOAGULANT  5,000 Units Subcutaneous Q12H     insulin aspart  1-7 Units Subcutaneous TID AC     insulin aspart  1-5 Units Subcutaneous At Bedtime     isosorbide mononitrate  30 mg Oral Daily     lidocaine  1 patch Transdermal Q24H     lidocaine  1 patch Transdermal Q24H     lidocaine   Transdermal Q8H     lidocaine   Transdermal Q8H     loratadine  10 mg Oral At Bedtime     silver sulfADIAZINE   Topical Daily     sodium chloride (PF)  3 mL Intracatheter Q8H     tamsulosin  0.4 mg Oral Daily       Data   Recent Labs   Lab 12/06/21  0738 12/06/21  0609 12/05/21  2158 12/05/21  0836 12/05/21  0443 12/04/21  1127 12/04/21  0852   WBC  --  14.0*  --   --  11.4*  --  13.2*   HGB  --  10.8*  --   --  10.7*  --  11.7*   MCV  --  93  --   --  94  --  95   PLT  --  323  --   --  315  --  349   NA  --  135  --   --  136  --  137   POTASSIUM  --  4.0  --   --  4.3  --  4.4   CHLORIDE  --  103  --   --  106  --  105   CO2  --  26  --   --  23  --  27   BUN  --  19  --   --  29  --  46*   CR  --  1.45*  --   --  1.66*  --  2.31*   ANIONGAP  --  6  --   --  7  --  5   SHANNAN  --  9.3  --   --  9.1  --  9.5   * 181* 179*   < > 148*   < > 211*    < > = values in this interval not displayed.       No results found for this or any previous visit (from the past 24 hour(s)).

## 2021-12-07 ENCOUNTER — APPOINTMENT (OUTPATIENT)
Dept: OCCUPATIONAL THERAPY | Facility: CLINIC | Age: 76
DRG: 683 | End: 2021-12-07
Payer: COMMERCIAL

## 2021-12-07 ENCOUNTER — APPOINTMENT (OUTPATIENT)
Dept: PHYSICAL THERAPY | Facility: CLINIC | Age: 76
DRG: 683 | End: 2021-12-07
Payer: COMMERCIAL

## 2021-12-07 LAB
ANION GAP SERPL CALCULATED.3IONS-SCNC: 8 MMOL/L (ref 3–14)
BASOPHILS # BLD AUTO: 0.1 10E3/UL (ref 0–0.2)
BASOPHILS NFR BLD AUTO: 0 %
BUN SERPL-MCNC: 15 MG/DL (ref 7–30)
CALCIUM SERPL-MCNC: 9.3 MG/DL (ref 8.5–10.1)
CHLORIDE BLD-SCNC: 98 MMOL/L (ref 94–109)
CO2 SERPL-SCNC: 26 MMOL/L (ref 20–32)
CREAT SERPL-MCNC: 1.28 MG/DL (ref 0.66–1.25)
EOSINOPHIL # BLD AUTO: 0.1 10E3/UL (ref 0–0.7)
EOSINOPHIL NFR BLD AUTO: 1 %
ERYTHROCYTE [DISTWIDTH] IN BLOOD BY AUTOMATED COUNT: 15.2 % (ref 10–15)
GFR SERPL CREATININE-BSD FRML MDRD: 54 ML/MIN/1.73M2
GLUCOSE BLD-MCNC: 228 MG/DL (ref 70–99)
GLUCOSE BLDC GLUCOMTR-MCNC: 156 MG/DL (ref 70–99)
GLUCOSE BLDC GLUCOMTR-MCNC: 163 MG/DL (ref 70–99)
GLUCOSE BLDC GLUCOMTR-MCNC: 176 MG/DL (ref 70–99)
HCT VFR BLD AUTO: 33.1 % (ref 40–53)
HGB BLD-MCNC: 10.2 G/DL (ref 13.3–17.7)
HOLD SPECIMEN: NORMAL
IMM GRANULOCYTES # BLD: 0.4 10E3/UL
IMM GRANULOCYTES NFR BLD: 3 %
LYMPHOCYTES # BLD AUTO: 1.5 10E3/UL (ref 0.8–5.3)
LYMPHOCYTES NFR BLD AUTO: 10 %
MCH RBC QN AUTO: 28.9 PG (ref 26.5–33)
MCHC RBC AUTO-ENTMCNC: 30.8 G/DL (ref 31.5–36.5)
MCV RBC AUTO: 94 FL (ref 78–100)
MONOCYTES # BLD AUTO: 1.1 10E3/UL (ref 0–1.3)
MONOCYTES NFR BLD AUTO: 8 %
NEUTROPHILS # BLD AUTO: 11.2 10E3/UL (ref 1.6–8.3)
NEUTROPHILS NFR BLD AUTO: 78 %
NRBC # BLD AUTO: 0 10E3/UL
NRBC BLD AUTO-RTO: 0 /100
PLATELET # BLD AUTO: 336 10E3/UL (ref 150–450)
POTASSIUM BLD-SCNC: 4.1 MMOL/L (ref 3.4–5.3)
RBC # BLD AUTO: 3.53 10E6/UL (ref 4.4–5.9)
SODIUM SERPL-SCNC: 132 MMOL/L (ref 133–144)
URATE SERPL-MCNC: 5.7 MG/DL (ref 3.5–7.2)
WBC # BLD AUTO: 14.4 10E3/UL (ref 4–11)

## 2021-12-07 PROCEDURE — 82310 ASSAY OF CALCIUM: CPT | Performed by: INTERNAL MEDICINE

## 2021-12-07 PROCEDURE — 84550 ASSAY OF BLOOD/URIC ACID: CPT | Performed by: PHYSICIAN ASSISTANT

## 2021-12-07 PROCEDURE — 99232 SBSQ HOSP IP/OBS MODERATE 35: CPT | Performed by: INTERNAL MEDICINE

## 2021-12-07 PROCEDURE — 97535 SELF CARE MNGMENT TRAINING: CPT | Mod: GO | Performed by: REHABILITATION PRACTITIONER

## 2021-12-07 PROCEDURE — 250N000011 HC RX IP 250 OP 636: Performed by: CLINICAL NURSE SPECIALIST

## 2021-12-07 PROCEDURE — 85025 COMPLETE CBC W/AUTO DIFF WBC: CPT | Performed by: INTERNAL MEDICINE

## 2021-12-07 PROCEDURE — 36415 COLL VENOUS BLD VENIPUNCTURE: CPT | Performed by: INTERNAL MEDICINE

## 2021-12-07 PROCEDURE — 258N000003 HC RX IP 258 OP 636: Performed by: INTERNAL MEDICINE

## 2021-12-07 PROCEDURE — 120N000006 HC R&B PEDS

## 2021-12-07 PROCEDURE — 250N000011 HC RX IP 250 OP 636: Performed by: INTERNAL MEDICINE

## 2021-12-07 PROCEDURE — 97530 THERAPEUTIC ACTIVITIES: CPT | Mod: GP | Performed by: PHYSICAL THERAPIST

## 2021-12-07 PROCEDURE — 250N000013 HC RX MED GY IP 250 OP 250 PS 637: Performed by: CLINICAL NURSE SPECIALIST

## 2021-12-07 PROCEDURE — 250N000013 HC RX MED GY IP 250 OP 250 PS 637: Performed by: INTERNAL MEDICINE

## 2021-12-07 PROCEDURE — 97116 GAIT TRAINING THERAPY: CPT | Mod: GP | Performed by: PHYSICAL THERAPIST

## 2021-12-07 RX ORDER — ALLOPURINOL 100 MG/1
100 TABLET ORAL DAILY
Status: DISCONTINUED | OUTPATIENT
Start: 2021-12-08 | End: 2021-12-09 | Stop reason: HOSPADM

## 2021-12-07 RX ORDER — COLCHICINE 0.6 MG/1
0.6 TABLET ORAL DAILY
Status: DISCONTINUED | OUTPATIENT
Start: 2021-12-07 | End: 2021-12-08

## 2021-12-07 RX ADMIN — METHOCARBAMOL 500 MG: 500 TABLET ORAL at 02:34

## 2021-12-07 RX ADMIN — HYDROMORPHONE HYDROCHLORIDE 1 MG: 2 TABLET ORAL at 08:21

## 2021-12-07 RX ADMIN — FLUTICASONE PROPIONATE 1 SPRAY: 50 SPRAY, METERED NASAL at 20:56

## 2021-12-07 RX ADMIN — HYDROMORPHONE HYDROCHLORIDE 1 MG: 2 TABLET ORAL at 22:28

## 2021-12-07 RX ADMIN — AMLODIPINE BESYLATE 5 MG: 5 TABLET ORAL at 08:21

## 2021-12-07 RX ADMIN — ATORVASTATIN CALCIUM 40 MG: 40 TABLET, FILM COATED ORAL at 08:21

## 2021-12-07 RX ADMIN — ACETAMINOPHEN 1000 MG: 500 TABLET, FILM COATED ORAL at 19:45

## 2021-12-07 RX ADMIN — DICLOFENAC SODIUM 4 G: 10 GEL TOPICAL at 04:55

## 2021-12-07 RX ADMIN — DICLOFENAC SODIUM 4 G: 10 GEL TOPICAL at 08:22

## 2021-12-07 RX ADMIN — METHOCARBAMOL 500 MG: 500 TABLET ORAL at 19:40

## 2021-12-07 RX ADMIN — ASPIRIN 81 MG: 81 TABLET, COATED ORAL at 08:21

## 2021-12-07 RX ADMIN — ALLOPURINOL 50 MG: 100 TABLET ORAL at 08:21

## 2021-12-07 RX ADMIN — INSULIN ASPART 1 UNITS: 100 INJECTION, SOLUTION INTRAVENOUS; SUBCUTANEOUS at 08:24

## 2021-12-07 RX ADMIN — HYDROMORPHONE HYDROCHLORIDE 1 MG: 2 TABLET ORAL at 14:21

## 2021-12-07 RX ADMIN — SODIUM CHLORIDE, POTASSIUM CHLORIDE, SODIUM LACTATE AND CALCIUM CHLORIDE: 600; 310; 30; 20 INJECTION, SOLUTION INTRAVENOUS at 08:20

## 2021-12-07 RX ADMIN — INSULIN ASPART 1 UNITS: 100 INJECTION, SOLUTION INTRAVENOUS; SUBCUTANEOUS at 16:59

## 2021-12-07 RX ADMIN — SILVER SULFADIAZINE: 10 CREAM TOPICAL at 09:50

## 2021-12-07 RX ADMIN — HYDROMORPHONE HYDROCHLORIDE 1 MG: 2 TABLET ORAL at 02:34

## 2021-12-07 RX ADMIN — FINASTERIDE 5 MG: 5 TABLET, FILM COATED ORAL at 08:21

## 2021-12-07 RX ADMIN — LORATADINE 10 MG: 10 TABLET ORAL at 21:14

## 2021-12-07 RX ADMIN — COLCHICINE 0.6 MG: 0.6 TABLET, FILM COATED ORAL at 16:00

## 2021-12-07 RX ADMIN — ISOSORBIDE MONONITRATE 30 MG: 30 TABLET, EXTENDED RELEASE ORAL at 08:21

## 2021-12-07 RX ADMIN — SENNOSIDES AND DOCUSATE SODIUM 1 TABLET: 50; 8.6 TABLET ORAL at 21:14

## 2021-12-07 RX ADMIN — HYDROMORPHONE HYDROCHLORIDE 0.3 MG: 1 INJECTION, SOLUTION INTRAMUSCULAR; INTRAVENOUS; SUBCUTANEOUS at 19:36

## 2021-12-07 RX ADMIN — ACETAMINOPHEN 1000 MG: 500 TABLET, FILM COATED ORAL at 14:09

## 2021-12-07 RX ADMIN — TAMSULOSIN HYDROCHLORIDE 0.4 MG: 0.4 CAPSULE ORAL at 08:21

## 2021-12-07 RX ADMIN — Medication 1 MG: at 21:16

## 2021-12-07 RX ADMIN — FERROUS GLUCONATE 324 MG: 324 TABLET ORAL at 08:21

## 2021-12-07 RX ADMIN — HEPARIN SODIUM 5000 UNITS: 5000 INJECTION INTRAVENOUS; SUBCUTANEOUS at 08:21

## 2021-12-07 RX ADMIN — DICLOFENAC SODIUM 4 G: 10 GEL TOPICAL at 11:56

## 2021-12-07 RX ADMIN — LIDOCAINE 1 PATCH: 246 PATCH TOPICAL at 08:22

## 2021-12-07 RX ADMIN — DICLOFENAC SODIUM 4 G: 10 GEL TOPICAL at 20:45

## 2021-12-07 RX ADMIN — HEPARIN SODIUM 5000 UNITS: 5000 INJECTION INTRAVENOUS; SUBCUTANEOUS at 20:56

## 2021-12-07 RX ADMIN — LIDOCAINE 1 PATCH: 246 PATCH TOPICAL at 08:23

## 2021-12-07 RX ADMIN — DICLOFENAC SODIUM 4 G: 10 GEL TOPICAL at 15:51

## 2021-12-07 RX ADMIN — ACETAMINOPHEN 1000 MG: 500 TABLET, FILM COATED ORAL at 08:21

## 2021-12-07 ASSESSMENT — ACTIVITIES OF DAILY LIVING (ADL)
ADLS_ACUITY_SCORE: 13
ADLS_ACUITY_SCORE: 15
ADLS_ACUITY_SCORE: 13
ADLS_ACUITY_SCORE: 15
ADLS_ACUITY_SCORE: 13
ADLS_ACUITY_SCORE: 13
DEPENDENT_IADLS:: INDEPENDENT

## 2021-12-07 NOTE — CONSULTS
Care Management Initial Consult    General Information  Assessment completed with: Patient,Children, Pt and step son Omari   Type of CM/SW Visit: Initial Assessment    Primary Care Provider verified and updated as needed: Yes   Readmission within the last 30 days: current reason for admission unrelated to previous admission      Reason for Consult: discharge planning  Advance Care Planning: Advance Care Planning Reviewed: present on chart,verified with patient          Communication Assessment  Patient's communication style: spoken language (English or Bilingual)    Hearing Difficulty or Deaf: no   Wear Glasses or Blind: yes    Cognitive  Cognitive/Neuro/Behavioral: WDL                      Living Environment:   People in home: alone     Current living Arrangements: house      Able to return to prior arrangements: no       Family/Social Support:  Care provided by: self  Provides care for: no one, unable/limited ability to care for self  Marital Status:  (Wife passed away last Friday 12/3/21)  Children          Description of Support System: Supportive,Involved    Support Assessment: Adequate family and caregiver support,Adequate social supports    Current Resources:   Patient receiving home care services: No     Community Resources: None  Equipment currently used at home: none  Supplies currently used at home: None    Employment/Financial:  Employment Status: retired        Financial Concerns:     Referral to Financial Counselor: No       Lifestyle & Psychosocial Needs:  Social Determinants of Health     Tobacco Use: Medium Risk     Smoking Tobacco Use: Former Smoker     Smokeless Tobacco Use: Never Used   Alcohol Use: Not on file   Financial Resource Strain: Not on file   Food Insecurity: Not on file   Transportation Needs: Not on file   Physical Activity: Not on file   Stress: Not on file   Social Connections: Not on file   Intimate Partner Violence: Not on file   Depression: Not at risk     PHQ-2 Score: 0    Housing Stability: Not on file       Functional Status:  Prior to admission patient needed assistance:   Dependent ADLs:: Independent  Dependent IADLs:: Independent  Assesssment of Functional Status: Not at baseline with ADL Functioning,Not at baseline with mobility,Not at  functional baseline,Needs placement in a SNF/TCF for rehabilitation    Mental Health Status/Chemical Dependency Status:  no                Values/Beliefs:  Spiritual, Cultural Beliefs, Rastafari Practices, Values that affect care: no       Cultural/Rastafari Practices Patient Routinely Participates In: prayer  Values/Beliefs Comment:  (Porfirio)    Additional Information:  SW met with pt who was laying in bed, pleasant. Pt's wife passed away recently (12/3/21). Patient reports he now lives alone. His adult children help when possible. Pt is independent at baseline with all ADL's, drives himself. Pt does own a walker to use during gonsales when it's icy.   Pt feels he is not at his baseline and needs TCU. Pt requested writer to call his son Omari for choices. Phone call with son Omari who agrees pt is weaker and would benefit from TCU. Omari reports pt has been to [facility] and would be 1st choice. Omari lives in [place] and would prefer TCU's near him. Brain is agreeable to referral near [facility]. Discussed WC cost with pt and Omari. Both are willing to pay the cost for WC ride.    Referrals sent to the following TCU:   [facility] [phone] Fax: [phone]  [facility]  Ph: [phone] F: [phone]  [facility]   SANJEEV Thompson

## 2021-12-07 NOTE — CONSULTS
[facility]    Orthopedic Consultation    Lance Ibrahim MRN# [id number]   Age: 76 year old YOB: 1945     Date of Admission:  12/3/2021    Reason for consult: Right shoulder        Requesting physician: Dr. Lagos       Level of consult: Consult and follow for daily recommendations           Assessment and Plan:   Assessment:   Right shoulder pain  Gout history  Rotator cuff arthropathy      Plan:   US guided injection for subacromial cortisone  Continue allopurinol, limited treatment options due to CKD and DEAN  Voltaren gel can be used on right shoulder as well   WBAT all extremities  ROM as tolerated all extremities   Uric Acid added to labs           Chief Complaint:   Right shoulder pain         History of Present Illness:   This patient is a 76 year old male who presents with the following condition requiring a hospital admission:    Per ED note 12/3: presents with back pain x1 week.  Patient reports a history of back pain approximately 10 years ago.  He required radiofrequency ablation which resulted in good control.  1 week ago he had the gradual onset of right lower thoracic back pain.  The pain is constant and aggravated by any movement.  He has been taking 3 tablets of Aleve PM at night with mild improvement for the last week.  There is no associated nausea, vomiting, abdominal pain, dysuria, urinary frequency, urinary/stool incontinence, lower extremity numbness, weakness.  He denies history of dialysis, anticoagulant use, malignancy or IV drug use.  Unfortunately patient's wife is upstairs with advanced cancer and was just made comfort care.    Ortho consulted for right shoulder pain. Patient notes this has been ongoing for the last 10 days. No injury or trauma. He notes he has a history of gout. On admittance it was noted that he was dehydrated and with CKD/DEAN. His right hand also is swollen through MCP joints with erythema. Both right shoulder and right hand are very  tender to light touch. No concern for infection at this time.           Past Medical History:     Past Medical History:   Diagnosis Date     Arthritis     osteoarthritis knees     BPH      CAD (coronary artery disease)     subtotal occlusion in the small distal LAD      Cardiomyopathy      Cerebral artery occlusion with cerebral infarction     TIA , no residual     Chronic kidney disease      Chronic rhinitis      HTN (hypertension)      Hyperlipidemia      Kidney stone      PVD (peripheral vascular disease)      Sleep apnea     doesn't use cpap     TIA (transient ischaemic attack)      Type 2 diabetes mellitus - 2018     Ureteral stone              Past Surgical History:     Past Surgical History:   Procedure Laterality Date     AMPUTATE TOE(S) Left 10/15/2018    Procedure: AMPUTATE TOE(S);  Left third toe amputation;  Surgeon: Ayaka Azevedo DPM, Podiatry/Foot and Ankle Surgery;  Location:  OR     ARTHROPLASTY KNEE Right 2018    Procedure: Right total knee arthroplasty;  Surgeon: Issa Cunha MD;  Location:  OR     COLONOSCOPY  2013    Procedure: COLONOSCOPY;  COLONOSCOPY;  Surgeon: Chau Hogan MD;  Location:  GI     CV HEART CATHETERIZATION WITH POSSIBLE INTERVENTION Left 2019    Procedure: Coronary Angiogram;  Surgeon: Nas Linda MD;  Location:  HEART CARDIAC CATH LAB     Diastasis recti repair  1985     EXTRACAPSULAR CATARACT EXTRATION WITH INTRAOCULAR LENS IMPLANT Left 2017     EXTRACAPSULAR CATARACT EXTRATION WITH INTRAOCULAR LENS IMPLANT Right      FOOT SURGERY  2013    cyst removal      HERNIA REPAIR  2004    ventral      ORIF Shoulder Left      Ventral hernia repair NOS  1987             Social History:     Social History     Tobacco Use     Smoking status: Former Smoker     Packs/day: 0.00     Quit date: 3/17/1993     Years since quittin.7     Smokeless tobacco: Never Used   Substance Use Topics     Alcohol use: Not Currently  "            Family History:     Family History   Problem Relation Age of Onset     Family History Negative Mother          88 yo     Cancer Father          76 yo brain     Diabetes Maternal Grandfather          91 yo     Alcohol/Drug Paternal Grandfather              Colon Cancer No family hx of              Immunizations:     VACCINE/DOSE   Diptheria   DPT   DTAP   HBIG   Hepatitis A   Hepatitis B   HIB   Influenza   Measles   Meningococcal   MMR   Mumps   Pneumococcal   Polio   Rubella   Small Pox   TDAP   Varicella   Zoster             Allergies:     Allergies   Allergen Reactions     Penicillins Anaphylaxis     \"anaphylactic\"     Sulfa Drugs      \"itchy rash, swelling of face and hands\"     Ancef [Cefazolin] Rash             Medications:     Current Facility-Administered Medications   Medication     acetaminophen (TYLENOL) tablet 1,000 mg     allopurinol (ZYLOPRIM) half-tab 50 mg     amLODIPine (NORVASC) tablet 5 mg     aspirin EC tablet 81 mg     atorvastatin (LIPITOR) tablet 40 mg     glucose gel 15-30 g    Or     dextrose 50 % injection 25-50 mL    Or     glucagon injection 1 mg     diclofenac (VOLTAREN) 1 % topical gel 4 g     ferrous gluconate (FERGON) tablet 324 mg     finasteride (PROSCAR) tablet 5 mg     fluticasone (FLONASE) 50 MCG/ACT spray 1 spray     heparin ANTICOAGULANT injection 5,000 Units     hydrALAZINE (APRESOLINE) injection 10 mg     HYDROmorphone (DILAUDID) half-tab 1 mg    Or     HYDROmorphone (PF) (DILAUDID) injection 0.3 mg     insulin aspart (NovoLOG) injection (RAPID ACTING)     insulin aspart (NovoLOG) injection (RAPID ACTING)     isosorbide mononitrate (IMDUR) 24 hr tablet 30 mg     lactated ringers infusion     Lidocaine (LIDOCARE) 4 % Patch 1 patch     Lidocaine (LIDOCARE) 4 % Patch 1 patch     lidocaine (LMX4) cream     lidocaine 1 % 0.1-1 mL     lidocaine patch in PLACE     lidocaine patch in PLACE     loratadine (CLARITIN) tablet 10 mg     " LORazepam (ATIVAN) half-tab 0.25 mg     melatonin tablet 1 mg     methocarbamol (ROBAXIN) tablet 500 mg     naloxone (NARCAN) injection 0.2 mg    Or     naloxone (NARCAN) injection 0.4 mg    Or     naloxone (NARCAN) injection 0.2 mg    Or     naloxone (NARCAN) injection 0.4 mg     ondansetron (ZOFRAN-ODT) ODT tab 4 mg    Or     ondansetron (ZOFRAN) injection 4 mg     polyethylene glycol (MIRALAX) Packet 17 g     senna-docusate (SENOKOT-S/PERICOLACE) 8.6-50 MG per tablet 1 tablet     senna-docusate (SENOKOT-S/PERICOLACE) 8.6-50 MG per tablet 1 tablet    Or     senna-docusate (SENOKOT-S/PERICOLACE) 8.6-50 MG per tablet 2 tablet     silver sulfADIAZINE (SILVADENE) 1 % cream     sodium chloride (PF) 0.9% PF flush 3 mL     sodium chloride (PF) 0.9% PF flush 3 mL     tamsulosin (FLOMAX) capsule 0.4 mg             Review of Systems:   CV: NEGATIVE for chest pain, palpitations or peripheral edema  C: NEGATIVE for fever, chills, change in weight  E/M: NEGATIVE for ear, mouth and throat problems  R: NEGATIVE for significant cough or SOB          Physical Exam:   All vitals have been reviewed  Patient Vitals for the past 24 hrs:   BP Temp Temp src Pulse Resp SpO2   12/07/21 0805 (!) 147/82 98.8  F (37.1  C) Oral 99 18 92 %   12/07/21 0000 (!) 154/78 98.3  F (36.8  C) Oral 89 18 94 %   12/06/21 1900 -- -- -- -- 16 --       Intake/Output Summary (Last 24 hours) at 12/7/2021 1209  Last data filed at 12/7/2021 1000  Gross per 24 hour   Intake 540 ml   Output 1325 ml   Net -785 ml     Patient laying comfortably in bed   Right shoulder very tender to even light touch  Able to passively ROM right shoulder internal and external rotation without pain  Active abduction or flexion very difficult due to pain  No pain with ROM of the elbow or wrist  Right MCP joints limited with ROM due to swelling but pain has improved per patient  Compartments of the right UE soft and compressible  Sensation intact to light touch equal and bilateral  +  radial pulse  Brisk cap refill  Able to oppose and abduct right thumb          Data:   All laboratory data reviewed  Results for orders placed or performed during the hospital encounter of 12/03/21   Abd/pelvis CT no contrast - Stone Protocol     Status: None    Narrative    CT ABDOMEN PELVIS W/O CONTRAST 12/3/2021 2:47 PM    CLINICAL HISTORY: right flank pain  TECHNIQUE: CT scan of the abdomen and pelvis was performed without IV  contrast. Multiplanar reformats were obtained. Dose reduction  techniques were used.  CONTRAST: None.    COMPARISON: 1/24/2020    FINDINGS:   LOWER CHEST: Normal.    HEPATOBILIARY: Hepatic steatosis. No calcified gallstones or biliary  ductal dilatation.    PANCREAS: Normal.    SPLEEN: Normal.    ADRENAL GLANDS: Stable 1.8 cm left adrenal adenoma.    KIDNEYS/BLADDER: No urinary system calculi, hydroureteronephrosis or  perinephric stranding. Simple cyst at the upper pole of the left  kidney requiring no specific follow-up, stable. Decompressed urinary  bladder.    BOWEL: Sigmoid diverticulosis. No small bowel or colonic obstruction  or inflammatory changes. Normal appendix.    LYMPH NODES: Several small retroperitoneal and bilateral iliac lymph  nodes are stable. For example a right common iliac 8 mm lymph node  (series 8, image 25), left common iliac and millimeter lymph node  (series 8, image 14) and bilateral distal external iliac prominent  lymph nodes are unchanged. No new or enlarging lymph nodes.    VASCULATURE: No abdominal aortic aneurysm.    PELVIC ORGANS: Mild intraprostatic calcifications. Stable  fat-containing moderate-sized right inguinal hernia.    OTHER: No free fluid or fluid collections. Ventral abdominal mesh. No  free air.    MUSCULOSKELETAL: Mild degenerative changes in the spine. No suspicious  lesions in the bones.      Impression    IMPRESSION:   1.  No acute findings in the abdomen and pelvis. No urinary system  calculi.  2.  Hepatic steatosis.  3.  Stable left  adrenal benign adenoma.  4.  Colonic diverticulosis.  5.  Stable fat-containing right inguinal hernia.    KENDRA LOPEZ MD         SYSTEM ID:  [id number]   Lumbar spine CT w/o contrast     Status: None    Narrative    CT LUMBAR SPINE WITHOUT CONTRAST  12/3/2021 2:46 PM     HISTORY: Back pain.      TECHNIQUE: Axial images of the lumbar spine were obtained without  intravenous contrast. Multiplanar reformations were performed.   Radiation dose for this scan was reduced using automated exposure  control, adjustment of the mA and/or kV according to patient size, or  iterative reconstruction technique.     COMPARISON: None.     FINDINGS:  There are 5 lumbar-type vertebral bodies assumed for the  purposes of this dictation.     Lumbar spine alignment is within normal limits. No loss of vertebral  body height. No evidence of fracture. No destructive bony lesion  appreciated.    Level by level as follows:     T12-L1: No loss of disc height. No significant disc herniation. Normal  facets. No spinal canal or neural foraminal narrowing.     L1-L2: No loss of disc height. No significant disc herniation.  Anterior osteophytic spurring. Normal facets. No spinal canal or  neural foraminal narrowing.     L2-L3: No loss of disc height. No significant disc herniation. Marked  anterior osteophytic spurring. Normal facets. No spinal canal or  neural foraminal narrowing.     L3-L4: Mild loss of disc height. Circumferential disc bulge with mild  endplate osteophytic spurring. Mild facet hypertrophy. No significant  spinal canal narrowing. Mild bilateral neural foraminal narrowing.     L4-L5: Moderate loss of disc height. Circumferential disc bulge with  endplate osteophytic spurring. Mild facet hypertrophy. Mild spinal  canal narrowing. Moderate right neural foraminal narrowing. Moderate  left neural foraminal narrowing.     L5-S1: Marked loss of disc height with degenerative endplate changes.  Circumferential disc bulge with prominent  endplate osteophytic  spurring. Moderate bilateral facet hypertrophy. Mild spinal canal  narrowing with narrowing of the lateral recesses bilaterally. Severe  right neural foraminal narrowing. Moderate left neural foraminal  narrowing.     Visualized paraspinous tissues: Unremarkable.       Impression    IMPRESSION:    1. No evidence of acute fracture or subluxation in the lumbar spine.  2. Degenerative changes in the lower lumbar spine as detailed above,  most pronounced at L5-S1.  3. At L5-S1, there is mild spinal canal narrowing with narrowing of  lateral recesses. Severe right and moderate left neural foraminal  narrowing.  4. At L4-L5, there is mild spinal canal narrowing as well as moderate  bilateral neural foraminal narrowing.     SULMA BENEDICT MD         SYSTEM ID:  [id number]   XR Shoulder Right G/E 3vw     Status: None    Narrative    XR SHOULDER RIGHT G/E 3 VIEWS  12/6/2021 12:32 PM     HISTORY: right shoulder pain    COMPARISON: None      Impression    IMPRESSION: No acute fracture or malalignment. Mild glenohumeral and  acromioclavicular joint degenerative changes. Calcification adjacent  to the greater tuberosity suggests calcific tendinopathy. Osteopenia.  Chronic lung changes.    KEN WEEKS MD         SYSTEM ID:  [id number]   Basic metabolic panel     Status: Abnormal   Result Value Ref Range    Sodium 137 133 - 144 mmol/L    Potassium 4.3 3.4 - 5.3 mmol/L    Chloride 105 94 - 109 mmol/L    Carbon Dioxide (CO2) 23 20 - 32 mmol/L    Anion Gap 9 3 - 14 mmol/L    Urea Nitrogen 60 (H) 7 - 30 mg/dL    Creatinine 3.38 (H) 0.66 - 1.25 mg/dL    Calcium 9.6 8.5 - 10.1 mg/dL    Glucose 142 (H) 70 - 99 mg/dL    GFR Estimate 17 (L) >60 mL/min/1.73m2   UA with Microscopic reflex to Culture     Status: Abnormal    Specimen: Urine, Clean Catch   Result Value Ref Range    Color Urine Yellow Colorless, Straw, Light Yellow, Yellow    Appearance Urine Slightly Cloudy (A) Clear    Glucose Urine Negative Negative  mg/dL    Bilirubin Urine Negative Negative    Ketones Urine Negative Negative mg/dL    Specific Gravity Urine 1.019 1.003 - 1.035    Blood Urine Negative Negative    pH Urine 5.0 5.0 - 7.0    Protein Albumin Urine 30  (A) Negative mg/dL    Urobilinogen Urine Normal Normal, 2.0 mg/dL    Nitrite Urine Negative Negative    Leukocyte Esterase Urine Negative Negative    Bacteria Urine Few (A) None Seen /HPF    Mucus Urine Present (A) None Seen /LPF    RBC Urine 2 <=2 /HPF    WBC Urine 5 <=5 /HPF    Squamous Epithelials Urine 2 (H) <=1 /HPF    Hyaline Casts Urine 3 (H) <=2 /LPF    Narrative    Urine Culture not indicated   CBC with platelets and differential     Status: Abnormal   Result Value Ref Range    WBC Count 14.3 (H) 4.0 - 11.0 10e3/uL    RBC Count 3.93 (L) 4.40 - 5.90 10e6/uL    Hemoglobin 11.7 (L) 13.3 - 17.7 g/dL    Hematocrit 37.3 (L) 40.0 - 53.0 %    MCV 95 78 - 100 fL    MCH 29.8 26.5 - 33.0 pg    MCHC 31.4 (L) 31.5 - 36.5 g/dL    RDW 15.8 (H) 10.0 - 15.0 %    Platelet Count 350 150 - 450 10e3/uL    % Neutrophils 69 %    % Lymphocytes 15 %    % Monocytes 9 %    % Eosinophils 2 %    % Basophils 1 %    % Immature Granulocytes 4 %    NRBCs per 100 WBC 0 <1 /100    Absolute Neutrophils 9.9 (H) 1.6 - 8.3 10e3/uL    Absolute Lymphocytes 2.2 0.8 - 5.3 10e3/uL    Absolute Monocytes 1.3 0.0 - 1.3 10e3/uL    Absolute Eosinophils 0.2 0.0 - 0.7 10e3/uL    Absolute Basophils 0.1 0.0 - 0.2 10e3/uL    Absolute Immature Granulocytes 0.6 (H) <=0.4 10e3/uL    Absolute NRBCs 0.0 10e3/uL   Creatinine POCT     Status: Abnormal   Result Value Ref Range    Creatinine POCT 3.6 (H) 0.7 - 1.3 mg/dL    GFR, ESTIMATED POCT 15 (L) >60 mL/min/1.73m2   Asymptomatic COVID-19 Virus (Coronavirus) by PCR Nasopharyngeal     Status: Normal    Specimen: Nasopharyngeal; Swab   Result Value Ref Range    SARS CoV2 PCR Negative Negative    Narrative    Testing was performed using the [employer]  SARS-CoV-2 & Influenza A/B Assay on the [employer]   System.  This test should be ordered for the detection of SARS-COV-2 in individuals who meet SARS-CoV-2 clinical and/or epidemiological criteria. Test performance is unknown in asymptomatic patients.  This test is for in vitro diagnostic use under the FDA EUA for laboratories certified under CLIA to perform moderate and/or high complexity testing. This test has not been FDA cleared or approved.  A negative test does not rule out the presence of PCR inhibitors in the specimen or target RNA in concentration below the limit of detection for the assay. The possibility of a false negative should be considered if the patient's recent exposure or clinical presentation suggests COVID-19.  [facility] are certified under the Clinical Laboratory Improvement Amendments of 1988 (CLIA-88) as qualified to perform moderate and/or high complexity laboratory testing.   Glucose by meter     Status: Abnormal   Result Value Ref Range    GLUCOSE BY METER POCT 157 (H) 70 - 99 mg/dL   Basic metabolic panel     Status: Abnormal   Result Value Ref Range    Sodium 137 133 - 144 mmol/L    Potassium 4.4 3.4 - 5.3 mmol/L    Chloride 105 94 - 109 mmol/L    Carbon Dioxide (CO2) 27 20 - 32 mmol/L    Anion Gap 5 3 - 14 mmol/L    Urea Nitrogen 46 (H) 7 - 30 mg/dL    Creatinine 2.31 (H) 0.66 - 1.25 mg/dL    Calcium 9.5 8.5 - 10.1 mg/dL    Glucose 211 (H) 70 - 99 mg/dL    GFR Estimate 26 (L) >60 mL/min/1.73m2   CBC with platelets     Status: Abnormal   Result Value Ref Range    WBC Count 13.2 (H) 4.0 - 11.0 10e3/uL    RBC Count 4.05 (L) 4.40 - 5.90 10e6/uL    Hemoglobin 11.7 (L) 13.3 - 17.7 g/dL    Hematocrit 38.6 (L) 40.0 - 53.0 %    MCV 95 78 - 100 fL    MCH 28.9 26.5 - 33.0 pg    MCHC 30.3 (L) 31.5 - 36.5 g/dL    RDW 15.5 (H) 10.0 - 15.0 %    Platelet Count 349 150 - 450 10e3/uL   Glucose by meter     Status: Abnormal   Result Value Ref Range    GLUCOSE BY METER POCT 168 (H) 70 - 99 mg/dL   Glucose by meter     Status: Abnormal    Result Value Ref Range    GLUCOSE BY METER POCT 149 (H) 70 - 99 mg/dL   Glucose by meter     Status: Abnormal   Result Value Ref Range    GLUCOSE BY METER POCT 184 (H) 70 - 99 mg/dL   Glucose by meter     Status: Abnormal   Result Value Ref Range    GLUCOSE BY METER POCT 181 (H) 70 - 99 mg/dL   CBC with platelets     Status: Abnormal   Result Value Ref Range    WBC Count 11.4 (H) 4.0 - 11.0 10e3/uL    RBC Count 3.67 (L) 4.40 - 5.90 10e6/uL    Hemoglobin 10.7 (L) 13.3 - 17.7 g/dL    Hematocrit 34.3 (L) 40.0 - 53.0 %    MCV 94 78 - 100 fL    MCH 29.2 26.5 - 33.0 pg    MCHC 31.2 (L) 31.5 - 36.5 g/dL    RDW 15.4 (H) 10.0 - 15.0 %    Platelet Count 315 150 - 450 10e3/uL   Basic metabolic panel     Status: Abnormal   Result Value Ref Range    Sodium 136 133 - 144 mmol/L    Potassium 4.3 3.4 - 5.3 mmol/L    Chloride 106 94 - 109 mmol/L    Carbon Dioxide (CO2) 23 20 - 32 mmol/L    Anion Gap 7 3 - 14 mmol/L    Urea Nitrogen 29 7 - 30 mg/dL    Creatinine 1.66 (H) 0.66 - 1.25 mg/dL    Calcium 9.1 8.5 - 10.1 mg/dL    Glucose 148 (H) 70 - 99 mg/dL    GFR Estimate 39 (L) >60 mL/min/1.73m2   Glucose by meter     Status: Abnormal   Result Value Ref Range    GLUCOSE BY METER POCT 158 (H) 70 - 99 mg/dL   Glucose by meter     Status: Abnormal   Result Value Ref Range    GLUCOSE BY METER POCT 183 (H) 70 - 99 mg/dL   Glucose by meter     Status: Abnormal   Result Value Ref Range    GLUCOSE BY METER POCT 126 (H) 70 - 99 mg/dL   Glucose by meter     Status: Abnormal   Result Value Ref Range    GLUCOSE BY METER POCT 179 (H) 70 - 99 mg/dL   CBC with platelets     Status: Abnormal   Result Value Ref Range    WBC Count 14.0 (H) 4.0 - 11.0 10e3/uL    RBC Count 3.70 (L) 4.40 - 5.90 10e6/uL    Hemoglobin 10.8 (L) 13.3 - 17.7 g/dL    Hematocrit 34.4 (L) 40.0 - 53.0 %    MCV 93 78 - 100 fL    MCH 29.2 26.5 - 33.0 pg    MCHC 31.4 (L) 31.5 - 36.5 g/dL    RDW 15.1 (H) 10.0 - 15.0 %    Platelet Count 323 150 - 450 10e3/uL   Basic metabolic panel      Status: Abnormal   Result Value Ref Range    Sodium 135 133 - 144 mmol/L    Potassium 4.0 3.4 - 5.3 mmol/L    Chloride 103 94 - 109 mmol/L    Carbon Dioxide (CO2) 26 20 - 32 mmol/L    Anion Gap 6 3 - 14 mmol/L    Urea Nitrogen 19 7 - 30 mg/dL    Creatinine 1.45 (H) 0.66 - 1.25 mg/dL    Calcium 9.3 8.5 - 10.1 mg/dL    Glucose 181 (H) 70 - 99 mg/dL    GFR Estimate 46 (L) >60 mL/min/1.73m2   Glucose by meter     Status: Abnormal   Result Value Ref Range    GLUCOSE BY METER POCT 153 (H) 70 - 99 mg/dL   Glucose by meter     Status: Abnormal   Result Value Ref Range    GLUCOSE BY METER POCT 143 (H) 70 - 99 mg/dL   Glucose by meter     Status: Abnormal   Result Value Ref Range    GLUCOSE BY METER POCT 157 (H) 70 - 99 mg/dL   Glucose by meter     Status: Abnormal   Result Value Ref Range    GLUCOSE BY METER POCT 196 (H) 70 - 99 mg/dL   Glucose by meter     Status: Abnormal   Result Value Ref Range    GLUCOSE BY METER POCT 163 (H) 70 - 99 mg/dL   Creatinine POCT     Status: Abnormal   Result Value Ref Range    Creatinine 3.6 (A) 0.5 - 1.2 mg/dl    GFR 15    CBC with platelets differential     Status: Abnormal    Narrative    The following orders were created for panel order CBC with platelets differential.  Procedure                               Abnormality         Status                     ---------                               -----------         ------                     CBC with platelets and d...[[id number]]  Abnormal            Final result                 Please view results for these tests on the individual orders.   CBC with Platelets & Differential     Status: None ()    Narrative    The following orders were created for panel order CBC with Platelets & Differential.  Procedure                               Abnormality         Status                     ---------                               -----------         ------                     CBC with platelets and d...[[id number]]                                                    Please view results for these tests on the individual orders.          Attestation:  I have reviewed today's vital signs, notes, medications, labs and imaging with Dr. Qureshi.  Amount of time performed on this consult: 30 minutes.    Estee Leslie PA-C

## 2021-12-07 NOTE — PROGRESS NOTES
[facility]    Hospitalist Progress Note  Provider : Deepika Lagos MD  Date of Service (when I saw the patient): 12/07/2021    Assessment & Plan    Lance Ibrahim is a 76 year old male with past medical history of hypertension, history of CVA in 1993, coronary artery disease, hyperlipidemia, obstructive sleep apnea, type 2 diabetes mellitus, chronic kidney disease stage IIIa who presented on 12/3/2021 with chief complaint of back pain. States that he has been taking Aleve for his back pain.  In the emergency department patient was found to have a temperature of 97.1  F, heart rate 89, blood pressure 102/56, respiratory rate 18, SPO2 100% on room air.  Lab work revealed a creatinine of 3.6 with a baseline of 1.1, leukocytosis of 14.3, hemoglobin 11.7.  The patient had a CT lumbar spine that showed no evidence of acute fracture, degenerative changes in the lumbar spine. The patient also had a CT abdomen and pelvis showing no acute findings, hepatic steatosis, stable left adrenal benign adenoma, colonic diverticulosis.    Dehydration  Acute Kidney Injury  Chronic Kidney Disease Stage 3a  -Baseline Cr = 1.1-1.3.  Initially creatinine elevated at 3.38. Creatinine improved with IV fluids  -Avoid Nephrotoxins  -No NSAIDS  -Encouraged PO intake  -Creatinine down to 1.28  today.  We will monitor BMP daily   -Will discontinue IV fluid today     Acute on Chronic Low Back Pain  Degenerative Disc Disease  -CT lumbar spine showed degenerative changes most pronounced at L5-S1 with severe right and moderate left neural foraminal narrowing  -PT/OT consulted  -Pain management team consulted for pain control  -Will continue Tylenol, methocarbamol, Topical Voltaren gel    Gout flare with right hand pain   -Complains of right hand pain. He stated that his symptoms are similar to his prior gout attacks.    -Patient stated that prednisone did not help much in the prior gout flares. Colchicine works for him.     -Received colchicine  0.6 mg x 1 dose on 12/6.  Renal function improving . Will continue colchicine 0.6 mg daily until flare resolves.  -We will continue allopurinol    Right shoulder pain with calcific tendinitis   -X-ray of the right shoulder done and showed degenerative changes and evidence of calcific tendinopathy.  -Orthopedic surgery consulted for possible steroid injection.    Hypertension  -BP stable  -Hold torsemide and lisinopril due  to DEAN.  Consider restarting torsemide and lisinopril in 1-2 days  -Will continue amlodipine.     Diabetes Mellitus Type 2  - A1C = 7.5 on 11/1/2021  -We will continue insulin sliding scale.  -Metformin initially held due to DEAN.  Renal function improving.  Will restart Metformin.     BPH  - Post voids  -We will continue home tamsulosin and finasteride     Chronic Medical Problems:  Hx of CVA in 1993  Gout  Hyperlipidemia  Coronary Artery Disease    DVT Prophylaxis: Pneumatic Compression Devices  Code Status: Full Code    Disposition: Expected discharge: Anticipate 1-2 nights of hospital course until pain improves    Deepika Lagos MD    Interval History   Patient seen and examined today.  He states that his hand pain is slightly better today.  Still has significant right shoulder pain.  No fever.  No nausea or vomiting.  No chest pain    -Data reviewed today: I reviewed all new labs and imaging results over the last 24 hours. I personally reviewed     Physical Exam   Temp: 98.8  F (37.1  C) Temp src: Oral BP: (!) 147/82 Pulse: 99   Resp: 18 SpO2: 92 % O2 Device: None (Room air)    Vitals:    12/03/21 2028   Weight: 105.6 kg (232 lb 14.4 oz)     Vital Signs with Ranges  Temp:  [98.3  F (36.8  C)-98.8  F (37.1  C)] 98.8  F (37.1  C)  Pulse:  [89-99] 99  Resp:  [16-18] 18  BP: (147-154)/(78-82) 147/82  SpO2:  [92 %-94 %] 92 %  I/O last 3 completed shifts:  In: 420 [P.O.:420]  Out: 1675 [Urine:1675]    GEN:  Alert, oriented x 3, appears comfortable, NAD.  HEENT:   Normocephalic/atraumatic, no scleral icterus, no nasal discharge, mouth moist.  CV:  Regular rate and rhythm, no murmur or JVD.  S1 + S2 noted, no S3 or S4.  LUNGS:  Clear to auscultation bilaterally without rales/rhonchi/wheezing/retractions.  Symmetric chest rise on inhalation noted.  ABD:  Active bowel sounds, soft, non-tender/non-distended.  No rebound/guarding/rigidity.  EXT:  No edema or cyanosis.  Hands/feet warm to touch with good signs of peripheral perfusion.  No joint synovitis noted.  SKIN:  Dry to touch, no exanthems noted in the visualized areas.  NEURO:  Symmetric muscle strength, sensation to touch grossly intact.  No new focal deficits appreciated.    Medications     lactated ringers 100 mL/hr at 12/07/21 0820       acetaminophen  1,000 mg Oral TID     allopurinol  50 mg Oral Daily     amLODIPine  5 mg Oral Daily     aspirin  81 mg Oral Daily     atorvastatin  40 mg Oral Daily     diclofenac  4 g Topical 4x Daily     ferrous gluconate  324 mg Oral Daily     finasteride  5 mg Oral Daily     fluticasone  1 spray Both Nostrils At Bedtime     heparin ANTICOAGULANT  5,000 Units Subcutaneous Q12H     insulin aspart  1-7 Units Subcutaneous TID AC     insulin aspart  1-5 Units Subcutaneous At Bedtime     isosorbide mononitrate  30 mg Oral Daily     lidocaine  1 patch Transdermal Q24H     lidocaine  1 patch Transdermal Q24H     lidocaine   Transdermal Q8H     lidocaine   Transdermal Q8H     loratadine  10 mg Oral At Bedtime     senna-docusate  1 tablet Oral At Bedtime     silver sulfADIAZINE   Topical Daily     sodium chloride (PF)  3 mL Intracatheter Q8H     tamsulosin  0.4 mg Oral Daily       Data   Recent Labs   Lab 12/07/21  1248 12/07/21  1209 12/07/21  0819 12/06/21  2203 12/06/21  0738 12/06/21  0609 12/05/21  0836 12/05/21  0443   WBC 14.4*  --   --   --   --  14.0*  --  11.4*   HGB 10.2*  --   --   --   --  10.8*  --  10.7*   MCV 94  --   --   --   --  93  --  94     --   --   --   --  323   --  315   NA  --  132*  --   --   --  135  --  136   POTASSIUM  --  4.1  --   --   --  4.0  --  4.3   CHLORIDE  --  98  --   --   --  103  --  106   CO2  --  26  --   --   --  26  --  23   BUN  --  15  --   --   --  19  --  29   CR  --  1.28*  --   --   --  1.45*  --  1.66*   ANIONGAP  --  8  --   --   --  6  --  7   SHANNAN  --  9.3  --   --   --  9.3  --  9.1   GLC  --  228* 163* 196*   < > 181*   < > 148*    < > = values in this interval not displayed.       No results found for this or any previous visit (from the past 24 hour(s)).

## 2021-12-07 NOTE — PLAN OF CARE
VSS, afebrile, alert and oriented. Assist x1 to stand at edge of bed to urine void in urinal. Calls appropriately for assistance. 6-9/10 pain well controlled with robaxin, PO dilaudid and scheduled tylenol.Drinking fair, IVF infusing per order, urine voiding. Right hand and arm swollen, warm/hot and numb/tingly per patient. He states that this has not changed from admission. Pulses +2, cap refill < 3 seconds. Aqua k pad applied to right shoulder overnight. Slept well around cares and urine voids.     Will continue to monitor.  Brittaney Vicente RN

## 2021-12-07 NOTE — PLAN OF CARE
Orientation:Alert and oriented x3  Vss afebrile. 92-93% ra  LS:clear  GI:bs+ tolerating regular diet. Fair appetite  : voiding with out problems.  Skin: right great toes wound. Cream applied this morning as ordered. Right hand thumb-middle finger red, warm and a swollen.   Activity:up with assist of 1-2, walker and gait belt  Pain: c/o pain in back, paresh knees, right hand and shoulder. Dilaudid 1mg given x2  Plan: pain control, ultrasound guided injection ordered, Ortho consulted, blood checks qid with insulin sliding scale. PT/OT/CC/SW following. Ivf sl.

## 2021-12-08 ENCOUNTER — APPOINTMENT (OUTPATIENT)
Dept: OCCUPATIONAL THERAPY | Facility: CLINIC | Age: 76
DRG: 683 | End: 2021-12-08
Payer: COMMERCIAL

## 2021-12-08 ENCOUNTER — APPOINTMENT (OUTPATIENT)
Dept: GENERAL RADIOLOGY | Facility: CLINIC | Age: 76
DRG: 683 | End: 2021-12-08
Attending: PHYSICIAN ASSISTANT
Payer: COMMERCIAL

## 2021-12-08 ENCOUNTER — DOCUMENTATION ONLY (OUTPATIENT)
Dept: CARDIOLOGY | Facility: CLINIC | Age: 76
End: 2021-12-08
Payer: COMMERCIAL

## 2021-12-08 ENCOUNTER — APPOINTMENT (OUTPATIENT)
Dept: PHYSICAL THERAPY | Facility: CLINIC | Age: 76
DRG: 683 | End: 2021-12-08
Payer: COMMERCIAL

## 2021-12-08 DIAGNOSIS — Z91.148 PATIENT FORGETS TO TAKE MEDICATION: Primary | ICD-10-CM

## 2021-12-08 LAB
ANION GAP SERPL CALCULATED.3IONS-SCNC: 10 MMOL/L (ref 3–14)
BUN SERPL-MCNC: 13 MG/DL (ref 7–30)
CALCIUM SERPL-MCNC: 8.9 MG/DL (ref 8.5–10.1)
CHLORIDE BLD-SCNC: 99 MMOL/L (ref 94–109)
CO2 SERPL-SCNC: 26 MMOL/L (ref 20–32)
CREAT SERPL-MCNC: 1.25 MG/DL (ref 0.66–1.25)
ERYTHROCYTE [DISTWIDTH] IN BLOOD BY AUTOMATED COUNT: 15.2 % (ref 10–15)
GFR SERPL CREATININE-BSD FRML MDRD: 55 ML/MIN/1.73M2
GLUCOSE BLD-MCNC: 164 MG/DL (ref 70–99)
GLUCOSE BLDC GLUCOMTR-MCNC: 157 MG/DL (ref 70–99)
GLUCOSE BLDC GLUCOMTR-MCNC: 167 MG/DL (ref 70–99)
GLUCOSE BLDC GLUCOMTR-MCNC: 222 MG/DL (ref 70–99)
GLUCOSE BLDC GLUCOMTR-MCNC: 91 MG/DL (ref 70–99)
HCT VFR BLD AUTO: 34.1 % (ref 40–53)
HGB BLD-MCNC: 10.7 G/DL (ref 13.3–17.7)
MCH RBC QN AUTO: 29.1 PG (ref 26.5–33)
MCHC RBC AUTO-ENTMCNC: 31.4 G/DL (ref 31.5–36.5)
MCV RBC AUTO: 93 FL (ref 78–100)
PLATELET # BLD AUTO: 372 10E3/UL (ref 150–450)
POTASSIUM BLD-SCNC: 3.7 MMOL/L (ref 3.4–5.3)
RBC # BLD AUTO: 3.68 10E6/UL (ref 4.4–5.9)
SODIUM SERPL-SCNC: 135 MMOL/L (ref 133–144)
WBC # BLD AUTO: 13.2 10E3/UL (ref 4–11)

## 2021-12-08 PROCEDURE — 97530 THERAPEUTIC ACTIVITIES: CPT | Mod: GP

## 2021-12-08 PROCEDURE — 255N000002 HC RX 255 OP 636: Performed by: PHYSICIAN ASSISTANT

## 2021-12-08 PROCEDURE — 250N000011 HC RX IP 250 OP 636: Performed by: PHYSICIAN ASSISTANT

## 2021-12-08 PROCEDURE — 80048 BASIC METABOLIC PNL TOTAL CA: CPT | Performed by: INTERNAL MEDICINE

## 2021-12-08 PROCEDURE — 250N000011 HC RX IP 250 OP 636: Performed by: INTERNAL MEDICINE

## 2021-12-08 PROCEDURE — 99207 PR NO CHARGE-RESEARCH SERVICE: CPT

## 2021-12-08 PROCEDURE — 250N000013 HC RX MED GY IP 250 OP 250 PS 637: Performed by: CLINICAL NURSE SPECIALIST

## 2021-12-08 PROCEDURE — 85014 HEMATOCRIT: CPT | Performed by: INTERNAL MEDICINE

## 2021-12-08 PROCEDURE — 250N000013 HC RX MED GY IP 250 OP 250 PS 637: Performed by: INTERNAL MEDICINE

## 2021-12-08 PROCEDURE — 20610 DRAIN/INJ JOINT/BURSA W/O US: CPT | Mod: RT

## 2021-12-08 PROCEDURE — 250N000011 HC RX IP 250 OP 636: Performed by: CLINICAL NURSE SPECIALIST

## 2021-12-08 PROCEDURE — 99232 SBSQ HOSP IP/OBS MODERATE 35: CPT | Performed by: CLINICAL NURSE SPECIALIST

## 2021-12-08 PROCEDURE — 97530 THERAPEUTIC ACTIVITIES: CPT | Mod: GO

## 2021-12-08 PROCEDURE — 99233 SBSQ HOSP IP/OBS HIGH 50: CPT | Performed by: INTERNAL MEDICINE

## 2021-12-08 PROCEDURE — 97116 GAIT TRAINING THERAPY: CPT | Mod: GP

## 2021-12-08 PROCEDURE — 250N000009 HC RX 250: Performed by: PHYSICIAN ASSISTANT

## 2021-12-08 PROCEDURE — 120N000006 HC R&B PEDS

## 2021-12-08 PROCEDURE — 36415 COLL VENOUS BLD VENIPUNCTURE: CPT | Performed by: INTERNAL MEDICINE

## 2021-12-08 PROCEDURE — 97535 SELF CARE MNGMENT TRAINING: CPT | Mod: GO

## 2021-12-08 RX ORDER — COLCHICINE 0.6 MG/1
0.6 TABLET ORAL 2 TIMES DAILY
Status: DISCONTINUED | OUTPATIENT
Start: 2021-12-09 | End: 2021-12-09 | Stop reason: HOSPADM

## 2021-12-08 RX ORDER — HYDROMORPHONE HYDROCHLORIDE 1 MG/ML
0.5 INJECTION, SOLUTION INTRAMUSCULAR; INTRAVENOUS; SUBCUTANEOUS ONCE
Status: COMPLETED | OUTPATIENT
Start: 2021-12-08 | End: 2021-12-08

## 2021-12-08 RX ORDER — TRIAMCINOLONE ACETONIDE 40 MG/ML
40 INJECTION, SUSPENSION INTRA-ARTICULAR; INTRAMUSCULAR ONCE
Status: DISCONTINUED | OUTPATIENT
Start: 2021-12-08 | End: 2021-12-08

## 2021-12-08 RX ORDER — HYDROMORPHONE HYDROCHLORIDE 2 MG/1
2-4 TABLET ORAL
Status: DISCONTINUED | OUTPATIENT
Start: 2021-12-08 | End: 2021-12-08

## 2021-12-08 RX ORDER — HYDROMORPHONE HYDROCHLORIDE 1 MG/ML
0.5 INJECTION, SOLUTION INTRAMUSCULAR; INTRAVENOUS; SUBCUTANEOUS
Status: DISCONTINUED | OUTPATIENT
Start: 2021-12-08 | End: 2021-12-09 | Stop reason: HOSPADM

## 2021-12-08 RX ORDER — BUPIVACAINE HYDROCHLORIDE 5 MG/ML
4 INJECTION, SOLUTION EPIDURAL; INTRACAUDAL ONCE
Status: DISCONTINUED | OUTPATIENT
Start: 2021-12-08 | End: 2021-12-08

## 2021-12-08 RX ORDER — IOPAMIDOL 408 MG/ML
1.5 INJECTION, SOLUTION INTRATHECAL ONCE
Status: COMPLETED | OUTPATIENT
Start: 2021-12-08 | End: 2021-12-08

## 2021-12-08 RX ORDER — OXYCODONE HYDROCHLORIDE 5 MG/1
5-10 TABLET ORAL EVERY 4 HOURS PRN
Status: DISCONTINUED | OUTPATIENT
Start: 2021-12-08 | End: 2021-12-09 | Stop reason: HOSPADM

## 2021-12-08 RX ORDER — BISACODYL 10 MG
10 SUPPOSITORY, RECTAL RECTAL DAILY PRN
Status: DISCONTINUED | OUTPATIENT
Start: 2021-12-08 | End: 2021-12-09 | Stop reason: HOSPADM

## 2021-12-08 RX ORDER — COLCHICINE 0.6 MG/1
0.6 TABLET ORAL ONCE
Status: COMPLETED | OUTPATIENT
Start: 2021-12-08 | End: 2021-12-08

## 2021-12-08 RX ORDER — LIDOCAINE HYDROCHLORIDE 10 MG/ML
3 INJECTION, SOLUTION EPIDURAL; INFILTRATION; INTRACAUDAL; PERINEURAL ONCE
Status: DISCONTINUED | OUTPATIENT
Start: 2021-12-08 | End: 2021-12-08

## 2021-12-08 RX ORDER — HYDROMORPHONE HYDROCHLORIDE 1 MG/ML
0.5 INJECTION, SOLUTION INTRAMUSCULAR; INTRAVENOUS; SUBCUTANEOUS
Status: DISCONTINUED | OUTPATIENT
Start: 2021-12-08 | End: 2021-12-08

## 2021-12-08 RX ORDER — METHOCARBAMOL 500 MG/1
500 TABLET, FILM COATED ORAL 4 TIMES DAILY
Status: DISCONTINUED | OUTPATIENT
Start: 2021-12-08 | End: 2021-12-09 | Stop reason: HOSPADM

## 2021-12-08 RX ORDER — HYDROMORPHONE HYDROCHLORIDE 2 MG/1
2 TABLET ORAL
Status: DISCONTINUED | OUTPATIENT
Start: 2021-12-08 | End: 2021-12-08

## 2021-12-08 RX ORDER — POLYETHYLENE GLYCOL 3350 17 G/17G
17 POWDER, FOR SOLUTION ORAL DAILY
Status: DISCONTINUED | OUTPATIENT
Start: 2021-12-08 | End: 2021-12-09 | Stop reason: HOSPADM

## 2021-12-08 RX ADMIN — ALLOPURINOL 100 MG: 100 TABLET ORAL at 09:18

## 2021-12-08 RX ADMIN — ACETAMINOPHEN 1000 MG: 500 TABLET, FILM COATED ORAL at 14:16

## 2021-12-08 RX ADMIN — DICLOFENAC SODIUM 4 G: 10 GEL TOPICAL at 09:23

## 2021-12-08 RX ADMIN — HYDROMORPHONE HYDROCHLORIDE 1 MG: 2 TABLET ORAL at 04:50

## 2021-12-08 RX ADMIN — TAMSULOSIN HYDROCHLORIDE 0.4 MG: 0.4 CAPSULE ORAL at 09:18

## 2021-12-08 RX ADMIN — SILVER SULFADIAZINE: 10 CREAM TOPICAL at 09:23

## 2021-12-08 RX ADMIN — LIDOCAINE HYDROCHLORIDE 3 ML: 10 INJECTION, SOLUTION EPIDURAL; INFILTRATION; INTRACAUDAL; PERINEURAL at 13:20

## 2021-12-08 RX ADMIN — COLCHICINE 0.6 MG: 0.6 TABLET, FILM COATED ORAL at 14:16

## 2021-12-08 RX ADMIN — METHOCARBAMOL 500 MG: 500 TABLET ORAL at 01:15

## 2021-12-08 RX ADMIN — HYDROMORPHONE HYDROCHLORIDE 0.5 MG: 1 INJECTION, SOLUTION INTRAMUSCULAR; INTRAVENOUS; SUBCUTANEOUS at 11:24

## 2021-12-08 RX ADMIN — OXYCODONE HYDROCHLORIDE 5 MG: 5 TABLET ORAL at 23:58

## 2021-12-08 RX ADMIN — BUPIVACAINE HYDROCHLORIDE 15 MG: 5 INJECTION, SOLUTION EPIDURAL; INTRACAUDAL at 13:21

## 2021-12-08 RX ADMIN — Medication 1 MG: at 20:46

## 2021-12-08 RX ADMIN — ACETAMINOPHEN 1000 MG: 500 TABLET, FILM COATED ORAL at 20:44

## 2021-12-08 RX ADMIN — AMLODIPINE BESYLATE 5 MG: 5 TABLET ORAL at 09:18

## 2021-12-08 RX ADMIN — OXYCODONE HYDROCHLORIDE 5 MG: 5 TABLET ORAL at 23:48

## 2021-12-08 RX ADMIN — FERROUS GLUCONATE 324 MG: 324 TABLET ORAL at 09:21

## 2021-12-08 RX ADMIN — SENNOSIDES AND DOCUSATE SODIUM 1 TABLET: 50; 8.6 TABLET ORAL at 09:18

## 2021-12-08 RX ADMIN — METHOCARBAMOL 500 MG: 500 TABLET ORAL at 16:32

## 2021-12-08 RX ADMIN — INSULIN ASPART 1 UNITS: 100 INJECTION, SOLUTION INTRAVENOUS; SUBCUTANEOUS at 14:14

## 2021-12-08 RX ADMIN — INSULIN ASPART 1 UNITS: 100 INJECTION, SOLUTION INTRAVENOUS; SUBCUTANEOUS at 09:17

## 2021-12-08 RX ADMIN — HYDROMORPHONE HYDROCHLORIDE 2 MG: 2 TABLET ORAL at 17:05

## 2021-12-08 RX ADMIN — ISOSORBIDE MONONITRATE 30 MG: 30 TABLET, EXTENDED RELEASE ORAL at 09:18

## 2021-12-08 RX ADMIN — HEPARIN SODIUM 5000 UNITS: 5000 INJECTION INTRAVENOUS; SUBCUTANEOUS at 20:47

## 2021-12-08 RX ADMIN — LORATADINE 10 MG: 10 TABLET ORAL at 20:45

## 2021-12-08 RX ADMIN — LIDOCAINE 1 PATCH: 246 PATCH TOPICAL at 09:21

## 2021-12-08 RX ADMIN — IOPAMIDOL 1.5 ML: 408 INJECTION, SOLUTION INTRATHECAL at 13:20

## 2021-12-08 RX ADMIN — DICLOFENAC SODIUM 4 G: 10 GEL TOPICAL at 16:26

## 2021-12-08 RX ADMIN — FLUTICASONE PROPIONATE 1 SPRAY: 50 SPRAY, METERED NASAL at 20:48

## 2021-12-08 RX ADMIN — ASPIRIN 81 MG: 81 TABLET, COATED ORAL at 09:18

## 2021-12-08 RX ADMIN — LIDOCAINE 1 PATCH: 246 PATCH TOPICAL at 09:22

## 2021-12-08 RX ADMIN — METHOCARBAMOL 500 MG: 500 TABLET ORAL at 07:02

## 2021-12-08 RX ADMIN — TRIAMCINOLONE ACETONIDE 40 MG: 40 INJECTION, SUSPENSION INTRA-ARTICULAR; INTRAMUSCULAR at 13:20

## 2021-12-08 RX ADMIN — HEPARIN SODIUM 5000 UNITS: 5000 INJECTION INTRAVENOUS; SUBCUTANEOUS at 09:17

## 2021-12-08 RX ADMIN — ATORVASTATIN CALCIUM 40 MG: 40 TABLET, FILM COATED ORAL at 09:18

## 2021-12-08 RX ADMIN — FINASTERIDE 5 MG: 5 TABLET, FILM COATED ORAL at 09:18

## 2021-12-08 RX ADMIN — HYDROMORPHONE HYDROCHLORIDE 1 MG: 2 TABLET ORAL at 01:15

## 2021-12-08 RX ADMIN — ACETAMINOPHEN 1000 MG: 500 TABLET, FILM COATED ORAL at 09:17

## 2021-12-08 RX ADMIN — DICLOFENAC SODIUM 4 G: 10 GEL TOPICAL at 06:39

## 2021-12-08 RX ADMIN — METHOCARBAMOL 500 MG: 500 TABLET ORAL at 11:29

## 2021-12-08 RX ADMIN — COLCHICINE 0.6 MG: 0.6 TABLET, FILM COATED ORAL at 09:17

## 2021-12-08 RX ADMIN — METHOCARBAMOL 500 MG: 500 TABLET ORAL at 20:45

## 2021-12-08 RX ADMIN — HYDROMORPHONE HYDROCHLORIDE 1 MG: 2 TABLET ORAL at 09:20

## 2021-12-08 ASSESSMENT — ACTIVITIES OF DAILY LIVING (ADL)
ADLS_ACUITY_SCORE: 13
ADLS_ACUITY_SCORE: 15
ADLS_ACUITY_SCORE: 16
ADLS_ACUITY_SCORE: 15
ADLS_ACUITY_SCORE: 16
ADLS_ACUITY_SCORE: 13
ADLS_ACUITY_SCORE: 15
ADLS_ACUITY_SCORE: 13
ADLS_ACUITY_SCORE: 15
ADLS_ACUITY_SCORE: 13
ADLS_ACUITY_SCORE: 13
ADLS_ACUITY_SCORE: 16
ADLS_ACUITY_SCORE: 13
ADLS_ACUITY_SCORE: 16
ADLS_ACUITY_SCORE: 15
ADLS_ACUITY_SCORE: 13

## 2021-12-08 NOTE — PLAN OF CARE
VSS. Tmax 100.1F, otherwise has been in the 99.0F range all night. Receiving PRN PO dilaudid and Robaxin for pain as well as Voltaren cream and scheduled tylenol. Also applying heat to R. Shoulder when lidocaine patch is not in place. Urine voiding small amounts frequently, which pt states is his baseline. Expresses frustration over being weak and not feeling well. Also looking forward to his steroid shot. Drinking well. Bed time , did not receive insulin.     Will continue to monitor.  Brittaney Vicente RN

## 2021-12-08 NOTE — PROGRESS NOTES
Pt was in Radiology today for a right shoulder joint steroid injection. Pt tolerated ortho procedure well. Pre procedure pain was 10 post procedure pain level 2-3.Procedure was completed by PILAR MARINO. There were no complications during this procedure. Pt verbalized understanding of written and verbal instructions and left department in stable and satisfactory condition with technologist. There is no evidence of bleeding or any other complications upon discharge.

## 2021-12-08 NOTE — PROGRESS NOTES
"    [facility]  Pain Management Progress Note  Text Page     Assessment & Plan   Lance Ibrahim is a 76 year old male who was admitted on 12/3/2021.     Met with Lance as he was resting in bed. He acknowledged the muscle spasms in his back are much better, but the pain in his right shoulder and fingers has been unrelenting \"the colchicine didn't help and they're supposed to get me to have some injection at noon.\" As Lance was grimacing in pain and acknowledged it was a 10 of 10, I requested his bedside nurse Marcella Gonzalez RN give him a dose if IV Dilaudid 0.5 mg in effort to ready him for today's right shoulder steroid injection. As Lance's step-son, Omari Ireland arrived and they were making  arrangements for his wife, I offered my condolences and excused myself.      PLAN:   1)  Opioids: Liberalize opiate as shoulder pain currently not well tolerated  Dilaudid 2 mg every 3 hours prn  Opioids Treatment Goal:   -Improvement in function  -Participate in PT     2)  Non-opioid multimodal medication therapy  -Topical:  Voltaren 1% Topical Gel to low back, Lidocaine Patch 4% to right shoulder  -N-SAIDS:  Avoid due to CKD  -Muscle Relaxants: Methocarbamol 500 mg QID  -Adjuvants:  Acetaminophen 1000 TID  -Antidepressants/anxiolytics:  Lorazepam 0.25 mg prn for anxiety     3)  Non-medication interventions  Positioning, Heating pad PRN, Relaxation, Meditation, Physical therapy   Appreciate input of ANDREW Colon as the patient's wife  12/3/2021 and [email or web address] him that day     4)  Constipation Prophylaxis  Continue senna-docusate at bedtime and as needed and change Polyethylene Glycol to daily.     5) Medication Risk reduction strategies   - Monitor for sedation   - Capnography per protocol   - Narcan for opioid reversal      6)  Pain Education  -Opioid safe use, storage and disposal information included in DC AVS     7)  DC " Planning   Discussed goal of Opioid therapy as above with patient, bedside nurse Marcella Gonzalez RN and Hospitalist Charanjit Boone MD  Length of therapy is less than 10 days, opioids may be stopped without taper.  Continued outpatient management of pain per PCP  Disposition: TCU  Support systems: Omari Pacheco  Outpatient Referrals: Consider follow up with PCP to arrange for pain clinic follow up due to chronic back pain  The following risk factors have been identified for unintentional overdose: patient is on multiple sedating medications , patient is > 65 years old, patient is overweight, patient has anxiety, depression or PTSD and patient has been switched to a new opioid medication. Discharge with intra-nasal naloxone if discharged to home with opioids  >40 mg MME/day.  Plan for education prior to discharge.     ASSESSMENT  1)  Acute on chronic back pain  12/3/2021 CT Lumbar spine L5-S1 severe right and moderate left neural foraminal narrowing.     2)  Patient with chronic back pain, and  Has been on intermittent opioid therapy during the past year.   Baseline 0 mg Daily Morphine Equivalent as dispensed and as reported daily use.  Patient has a possible opioid tolerance.      Patient's opioid use in past 24 hours: 5 mg oral and 0.3 mg IV Dilaudid = 26 mg Daily Morphine Equivalent     3)  Risk factors for opioid related harms  - Concurrent benzodiazepine use  - Sleep disordered breathing  - Renal insufficiency  - Age > 65 years old  - Anxiety/depression     4)  Opioid induced side-effects:  -Constipation - Yes, with no stool since admission  -Nausea/Vomit - No/denies  -Sedation - no/denies  -Urinary Retention No/denies     5)  Other/Related:    -Osteoarthritis  -Sleep Apnea - does not use CPAP  -History TIA    Monique Li MS, RN, CNS, APRN, ACHPN, FAACVPR  Pain and Palliative Care  Pager [phone]  Office [phone]       Time Spent on this Encounter   Total unit/floor time 10:50 AM until 11:10 AM  minutes, time consisted of the following, examination of the patient, reviewing the record and completing documentation. >50% of time spent in counseling and coordination of care.  Time spend counseling with patient and family consisted of the following topics, symptom management.  Time spent in coordination of care with Bedside Nurse Marcella Gonzalez RN and Hospitalist Charanjit Boone MD.       Interval History   Continues to have severe gout pain in right shoulder    Review of Systems   CONSTITUTIONAL: NEGATIVE for fever, chills, change in weight  ENT/MOUTH: NEGATIVE for ear, mouth and throat problems  RESP: NEGATIVE for significant cough or SOB  CV: NEGATIVE for chest pain, palpitations or peripheral edema    Physical Exam   Temp:  [98.2  F (36.8  C)-100.1  F (37.8  C)] 98.6  F (37  C)  Pulse:  [] 102  Resp:  [16-20] 20  BP: (113-154)/(76-80) 154/76  SpO2:  [91 %-93 %] 91 %  232 lbs 14.4 oz  GEN:  Alert, oriented x 3, appears comfortable, NAD.  HEENT:  Normocephalic/atraumatic, no scleral icterus, no nasal discharge, mouth moist.  RESP:  Symmetric chest rise on inhalation noted.  Normal respiratory effort.  PAIN BEHAVIOR: Cooperative  Psych:  Normal affect.  Calm, cooperative, conversant appropriately.    Medications       acetaminophen  1,000 mg Oral TID     allopurinol  100 mg Oral Daily     amLODIPine  5 mg Oral Daily     aspirin  81 mg Oral Daily     atorvastatin  40 mg Oral Daily     [START ON 12/9/2021] colchicine  0.6 mg Oral BID     colchicine  0.6 mg Oral Once     diclofenac  4 g Topical 4x Daily     ferrous gluconate  324 mg Oral Daily     finasteride  5 mg Oral Daily     fluticasone  1 spray Both Nostrils At Bedtime     heparin ANTICOAGULANT  5,000 Units Subcutaneous Q12H     insulin aspart  1-7 Units Subcutaneous TID AC     insulin aspart  1-5 Units Subcutaneous At Bedtime     isosorbide mononitrate  30 mg Oral Daily     lidocaine  1 patch Transdermal Q24H     lidocaine  1 patch Transdermal  Q24H     lidocaine   Transdermal Q8H     lidocaine   Transdermal Q8H     loratadine  10 mg Oral At Bedtime     methocarbamol  500 mg Oral 4x Daily     senna-docusate  1 tablet Oral At Bedtime     silver sulfADIAZINE   Topical Daily     sodium chloride (PF)  3 mL Intracatheter Q8H     tamsulosin  0.4 mg Oral Daily       Data   Results for orders placed or performed during the hospital encounter of 12/03/21 (from the past 24 hour(s))   Glucose by meter   Result Value Ref Range    GLUCOSE BY METER POCT 156 (H) 70 - 99 mg/dL   Glucose by meter   Result Value Ref Range    GLUCOSE BY METER POCT 176 (H) 70 - 99 mg/dL   CBC with platelets   Result Value Ref Range    WBC Count 13.2 (H) 4.0 - 11.0 10e3/uL    RBC Count 3.68 (L) 4.40 - 5.90 10e6/uL    Hemoglobin 10.7 (L) 13.3 - 17.7 g/dL    Hematocrit 34.1 (L) 40.0 - 53.0 %    MCV 93 78 - 100 fL    MCH 29.1 26.5 - 33.0 pg    MCHC 31.4 (L) 31.5 - 36.5 g/dL    RDW 15.2 (H) 10.0 - 15.0 %    Platelet Count 372 150 - 450 10e3/uL   Basic metabolic panel   Result Value Ref Range    Sodium 135 133 - 144 mmol/L    Potassium 3.7 3.4 - 5.3 mmol/L    Chloride 99 94 - 109 mmol/L    Carbon Dioxide (CO2) 26 20 - 32 mmol/L    Anion Gap 10 3 - 14 mmol/L    Urea Nitrogen 13 7 - 30 mg/dL    Creatinine 1.25 0.66 - 1.25 mg/dL    Calcium 8.9 8.5 - 10.1 mg/dL    Glucose 164 (H) 70 - 99 mg/dL    GFR Estimate 55 (L) >60 mL/min/1.73m2   Glucose by meter   Result Value Ref Range    GLUCOSE BY METER POCT 157 (H) 70 - 99 mg/dL   image guided right shoulder subacromial steroid injection    Gemini Wesley PA-C     12/8/2021  1:28 PM  [facility]    Procedure: Image guided right shoulder subacromial steroid injection    Date/Time: 12/8/2021 1:27 PM  Performed by: Gemini Manrique PA-C  Authorized by: Gemini Manrique PA-C       UNIVERSAL PROTOCOL   Site Marked: Yes  Prior Images Obtained and Reviewed:  Yes  Required items: Required blood products, implants,  devices and special   equipment available    Patient identity confirmed:  Verbally with patient  NA - No sedation, light sedation, or local anesthesia  Confirmation Checklist:  Patient's identity using two indicators, relevant   allergies, procedure was appropriate and matched the consent or emergent   situation and correct equipment/implants were available  Time out: Immediately prior to the procedure a time out was called    Universal Protocol: the Joint Commission Universal Protocol was followed    Preparation: Patient was prepped and draped in usual sterile fashion       ANESTHESIA    Anesthesia: Local infiltration  Local Anesthetic:  Lidocaine 1% without epinephrine      SEDATION    Patient Sedated: No    See dictated procedure note for full details.    PROCEDURE    Patient Tolerance:  Patient tolerated the procedure well with no immediate   complications  Length of time physician/provider present for 1:1 monitoring during   sedation: 0

## 2021-12-08 NOTE — PROGRESS NOTES
SURPASS study: The Effect of Tirzepatide Verses Dulaglutide on Major Adverse Cardiovascular Events in Patients with Type 2 Diabetes.   Spoke with candidate for the above study today for prescreening call.     Patient does not qualify due to no qualifying event in medical history. Patient is currently hospitalized and he was calling to reschedule the phone visit.     Plan: Will cancel upcoming screenings.    Va Snow RN  Clinical Research Nurse  [phone]

## 2021-12-08 NOTE — PLAN OF CARE
Reporting right shoulder pain 6/10 radiating up from fingers.  Using oral dilaudid, tyl, lidocaine patches and creams with little relief.  IV dilaudid given reports some relief.  and 167 with 1 unit correction each check.  Assist of one up to commode to have a BM; using urinal to void.  Steroid injection around 1300 done in radiology; pt reports rt shoulder still numb.  Cont with plan of care.

## 2021-12-08 NOTE — PROGRESS NOTES
Care Management Discharge Note    Discharge Date: 12/09/21       Discharge Disposition:  TCU    Discharge Services:  Referral to TCU and then return home      Discharge Transportation:  [facility] stretcher. Patient aware of cost but states he is in so much pain he does not care.      Private pay costs discussed:  Yes patient wants private room and is aware of cost.  Patient aware of transportation cost.      PAS Confirmation Code:  Your information has been submitted on December 08th, 2021 at 11:55:05 AM [facility]. The confirmation number is [id number]  Patient/family educated on Medicare website which has current facility and service quality ratings:  Yes    Education Provided on the Discharge Plan:  Yes  Persons Notified of Discharge Plans: Yes  Patient/Family in Agreement with the Plan:  Yes      Additional Information:  SW met with patient to discuss discharge to TCU.  Patient states he has no control over his life as he is in so much pain and to talk to his junior and LUIS Salcido.  Patient reports he is in so much pain it is unbearable.  Phone message left for Omari.     [facility] patient pending insurance verification. Need to verify private room and discuss on discharge choice with junior Salcido.  Also [facility] in [place].  SW discussed with patient's junior Salcido and he will make choice by end of today.          DOUGLAS Hou    ,

## 2021-12-08 NOTE — PROGRESS NOTES
[facility]  Hospitalist Progress Note  Provider : Charanjit Boone  Date of Service (when I saw the patient): 2021    Assessment & Plan    Lance Ibrahim is a 76 year old male with past medical history of hypertension, history of CVA in , coronary artery disease, hyperlipidemia, obstructive sleep apnea, type 2 diabetes mellitus, chronic kidney disease stage IIIa who presented on 12/3/2021 with chief complaint of back pain. States that he has been taking Aleve for his back pain.  In the emergency department patient was found to have a temperature of 97.1  F, heart rate 89, blood pressure 102/56, respiratory rate 18, SPO2 100% on room air.  Lab work revealed a creatinine of 3.6 with a baseline of 1.1, leukocytosis of 14.3, hemoglobin 11.7.  The patient had a CT lumbar spine that showed no evidence of acute fracture, degenerative changes in the lumbar spine. The patient also had a CT abdomen and pelvis showing no acute findings, hepatic steatosis, stable left adrenal benign adenoma, colonic diverticulosis.    At this point his kidney function has improved back to baseline but he has fairly debilitating pain related to a gout flareup.  He was started on colchicine and is undergoing a right shoulder steroid injection on  as per orthopedics.    Overlying all of this his wife recently passed away and they are making arrangements for her .    Dehydration  Acute Kidney Injury  Chronic Kidney Disease Stage 3a  -Baseline Cr = 1.1-1.3.  Initially creatinine elevated at 3.38. Creatinine improved with IV fluids  -Avoid Nephrotoxins  -No NSAIDS  -Encouraged PO intake  -Check BMP daily     Acute on Chronic Low Back Pain  Degenerative Disc Disease  -CT lumbar spine showed degenerative changes most pronounced at L5-S1 with severe right and moderate left neural foraminal narrowing  -PT/OT consulted  -Pain management team consulted for pain control  -Will continue Tylenol,  methocarbamol, Topical Voltaren gel    Gout flare with right hand pain   -Complains of right hand pain. He stated that his symptoms are similar to his prior gout attacks but more severe.  -Patient stated that prednisone did not help much in the prior gout flares. Colchicine works for him.    -Started on colchicine 0.6 mg once daily on 12/6.  -Increasing colchicine to 0.6 mg twice daily on 12/8  -Getting a right shoulder steroid injection in the setting of calcific tendinitis as below  -We will continue allopurinol    Right shoulder pain with calcific tendinitis   -X-ray of the right shoulder done and showed degenerative changes and evidence of calcific tendinopathy.    -Orthopedic surgery consulted and he is getting a right shoulder steroid injection on 12/8    Hypertension  -BP stable  -Hold torsemide and lisinopril due  to DEAN.  Consider restarting torsemide and lisinopril in 1-2 days  -Will continue amlodipine, Imdur     Diabetes Mellitus Type 2  - A1C = 7.5 on 11/1/2021  -We will continue insulin sliding scale.  -Metformin initially held due to DEAN.  Renal function improving.  Will restart Metformin.     BPH  - Post voids  -We will continue home tamsulosin and finasteride    Leukocytosis: No infection identified.  Monitor.  Has only had intermittent low-grade fevers up to 100.1.  Could be related to gout flare and pain.  Monitor for infection.     Chronic Medical Problems:  Hx of CVA in 1993  Gout  Hyperlipidemia  Coronary Artery Disease    DVT Prophylaxis: Pneumatic Compression Devices  Code Status: Full Code    Disposition: Expected discharge: Anticipate 1-2 nights of hospital course until pain improves.  Hoping he will be mobile enough to go home with better pain control, but might need TCU.  Social work following.      Interval History   Patient seen and examined today.    His hand and shoulder pain are still fairly debilitating  Increasing colchicine to twice daily  Increasing the range on his oral Dilaudid  from 2 mg to up to 4 mg  Right shoulder injection planned for today    -Data reviewed today: I reviewed all new labs and imaging results over the last 24 hours. I personally reviewed     Physical Exam   Temp: 98.6  F (37  C) Temp src: Oral BP: (!) 154/76 Pulse: 102   Resp: 20 SpO2: 91 % O2 Device: None (Room air)    Vitals:    12/03/21 2028   Weight: 105.6 kg (232 lb 14.4 oz)     Vital Signs with Ranges  Temp:  [98.2  F (36.8  C)-100.1  F (37.8  C)] 98.6  F (37  C)  Pulse:  [] 102  Resp:  [16-20] 20  BP: (113-154)/(76-80) 154/76  SpO2:  [91 %-93 %] 91 %  I/O last 3 completed shifts:  In: 1320 [P.O.:1320]  Out: 1350 [Urine:1350]    GEN:  Alert, oriented x 3, appears uncomfortable and sad.  HEENT:  Normocephalic/atraumatic, no scleral icterus, no nasal discharge, mouth moist.  CV:  Regular rate and rhythm  LUNGS:  Clear to auscultation bilaterally without rales/rhonchi/wheezing/retractions.    ABD:  Active bowel sounds, soft, non-tender/non-distended.  No rebound/guarding/rigidity.  EXT: Right hand fairly swollen with prominent swelling of the first and second digits.  Right shoulder does not outwardly appear swollen or erythematous.  SKIN:  Dry to touch, no exanthems noted in the visualized areas.  NEURO: No new focal deficits appreciated.    Medications       acetaminophen  1,000 mg Oral TID     allopurinol  100 mg Oral Daily     amLODIPine  5 mg Oral Daily     aspirin  81 mg Oral Daily     atorvastatin  40 mg Oral Daily     [START ON 12/9/2021] colchicine  0.6 mg Oral BID     colchicine  0.6 mg Oral Once     diclofenac  4 g Topical 4x Daily     ferrous gluconate  324 mg Oral Daily     finasteride  5 mg Oral Daily     fluticasone  1 spray Both Nostrils At Bedtime     heparin ANTICOAGULANT  5,000 Units Subcutaneous Q12H     insulin aspart  1-7 Units Subcutaneous TID AC     insulin aspart  1-5 Units Subcutaneous At Bedtime     isosorbide mononitrate  30 mg Oral Daily     lidocaine  1 patch Transdermal Q24H      lidocaine  1 patch Transdermal Q24H     lidocaine   Transdermal Q8H     lidocaine   Transdermal Q8H     loratadine  10 mg Oral At Bedtime     methocarbamol  500 mg Oral 4x Daily     senna-docusate  1 tablet Oral At Bedtime     silver sulfADIAZINE   Topical Daily     sodium chloride (PF)  3 mL Intracatheter Q8H     tamsulosin  0.4 mg Oral Daily       Data   Recent Labs   Lab 12/08/21  0817 12/08/21  0646 12/07/21  2108 12/07/21  1656 12/07/21  1248 12/07/21  1209 12/06/21  0738 12/06/21  0609   WBC  --  13.2*  --   --  14.4*  --   --  14.0*   HGB  --  10.7*  --   --  10.2*  --   --  10.8*   MCV  --  93  --   --  94  --   --  93   PLT  --  372  --   --  336  --   --  323   NA  --  135  --   --   --  132*  --  135   POTASSIUM  --  3.7  --   --   --  4.1  --  4.0   CHLORIDE  --  99  --   --   --  98  --  103   CO2  --  26  --   --   --  26  --  26   BUN  --  13  --   --   --  15  --  19   CR  --  1.25  --   --   --  1.28*  --  1.45*   ANIONGAP  --  10  --   --   --  8  --  6   SHANNAN  --  8.9  --   --   --  9.3  --  9.3   * 164* 176*   < >  --  228*   < > 181*    < > = values in this interval not displayed.       No results found for this or any previous visit (from the past 24 hour(s)).

## 2021-12-08 NOTE — PROGRESS NOTES
Orthopedic Surgery    S/P Right subacromial shoulder steroid injection    Patient reports his pain is much improved since receiving the injection.  He continues to have right hand pain.     BP (!) 154/76   Pulse 102   Temp 98.6  F (37  C)   Resp 20   Ht 1.829 m (6')   Wt 105.6 kg (232 lb 14.4 oz)   SpO2 91%   BMI 31.59 kg/m      On exam of the right shoulder:   Able to flex and abduct the shoulder to 90 degrees  Denies any numbness  Pulses present  Continues to have tenderness and erythema along the dorsal 2-4 MCP joints and limited flexion at these joints.      Plan:  Continue allopurinol and colchicine per medicine recs  Ice shoulder prn  Elevate right hand when at rest  Will continue to follow.     Licha Dietz PA-C on 12/8/2021 at 3:02 PM

## 2021-12-08 NOTE — PROGRESS NOTES
Paged w/ request to trial oxycodone instead of PO dilaudid.  Orders placed.    Charanjit Boone MD

## 2021-12-08 NOTE — PROCEDURES
[facility]    Procedure: Image guided right shoulder subacromial steroid injection    Date/Time: 12/8/2021 1:27 PM  Performed by: Gemini Manrique PA-C  Authorized by: Gemini Manrique PA-C       UNIVERSAL PROTOCOL   Site Marked: Yes  Prior Images Obtained and Reviewed:  Yes  Required items: Required blood products, implants, devices and special equipment available    Patient identity confirmed:  Verbally with patient  NA - No sedation, light sedation, or local anesthesia  Confirmation Checklist:  Patient's identity using two indicators, relevant allergies, procedure was appropriate and matched the consent or emergent situation and correct equipment/implants were available  Time out: Immediately prior to the procedure a time out was called    Universal Protocol: the Joint Commission Universal Protocol was followed    Preparation: Patient was prepped and draped in usual sterile fashion       ANESTHESIA    Anesthesia: Local infiltration  Local Anesthetic:  Lidocaine 1% without epinephrine      SEDATION    Patient Sedated: No    See dictated procedure note for full details.    PROCEDURE    Patient Tolerance:  Patient tolerated the procedure well with no immediate complications  Length of time physician/provider present for 1:1 monitoring during sedation: 0

## 2021-12-09 VITALS
SYSTOLIC BLOOD PRESSURE: 145 MMHG | DIASTOLIC BLOOD PRESSURE: 73 MMHG | OXYGEN SATURATION: 94 % | TEMPERATURE: 98.4 F | HEIGHT: 72 IN | HEART RATE: 92 BPM | BODY MASS INDEX: 31.54 KG/M2 | RESPIRATION RATE: 20 BRPM | WEIGHT: 232.9 LBS

## 2021-12-09 PROBLEM — K59.09 OTHER CONSTIPATION: Status: ACTIVE | Noted: 2021-12-09

## 2021-12-09 LAB
GLUCOSE BLDC GLUCOMTR-MCNC: 143 MG/DL (ref 70–99)
GLUCOSE BLDC GLUCOMTR-MCNC: 263 MG/DL (ref 70–99)
PLATELET # BLD AUTO: 354 10E3/UL (ref 150–450)

## 2021-12-09 PROCEDURE — 250N000011 HC RX IP 250 OP 636: Performed by: INTERNAL MEDICINE

## 2021-12-09 PROCEDURE — 250N000013 HC RX MED GY IP 250 OP 250 PS 637: Performed by: INTERNAL MEDICINE

## 2021-12-09 PROCEDURE — 99239 HOSP IP/OBS DSCHRG MGMT >30: CPT | Performed by: INTERNAL MEDICINE

## 2021-12-09 PROCEDURE — 250N000013 HC RX MED GY IP 250 OP 250 PS 637: Performed by: CLINICAL NURSE SPECIALIST

## 2021-12-09 PROCEDURE — 85049 AUTOMATED PLATELET COUNT: CPT | Performed by: INTERNAL MEDICINE

## 2021-12-09 PROCEDURE — 36415 COLL VENOUS BLD VENIPUNCTURE: CPT | Performed by: INTERNAL MEDICINE

## 2021-12-09 RX ORDER — LIDOCAINE 4 G/G
1 PATCH TOPICAL EVERY 24 HOURS
Status: ON HOLD | DISCHARGE
Start: 2021-12-09 | End: 2023-08-10

## 2021-12-09 RX ORDER — POLYETHYLENE GLYCOL 3350 17 G/17G
17 POWDER, FOR SOLUTION ORAL DAILY
Qty: 510 G | DISCHARGE
Start: 2021-12-09 | End: 2022-01-03

## 2021-12-09 RX ORDER — BISACODYL 10 MG
10 SUPPOSITORY, RECTAL RECTAL DAILY PRN
DISCHARGE
Start: 2021-12-09 | End: 2022-01-03

## 2021-12-09 RX ORDER — COLCHICINE 0.6 MG/1
TABLET ORAL
DISCHARGE
Start: 2021-12-09 | End: 2021-12-18

## 2021-12-09 RX ORDER — ACETAMINOPHEN 500 MG
1000 TABLET ORAL EVERY 8 HOURS PRN
DISCHARGE
Start: 2021-12-09 | End: 2022-02-01

## 2021-12-09 RX ORDER — METHOCARBAMOL 500 MG/1
500 TABLET, FILM COATED ORAL 4 TIMES DAILY PRN
DISCHARGE
Start: 2021-12-09 | End: 2022-01-03

## 2021-12-09 RX ORDER — AMOXICILLIN 250 MG
1 CAPSULE ORAL 2 TIMES DAILY PRN
DISCHARGE
Start: 2021-12-09 | End: 2022-01-03

## 2021-12-09 RX ORDER — OXYCODONE HYDROCHLORIDE 5 MG/1
5-10 TABLET ORAL EVERY 4 HOURS PRN
Qty: 25 TABLET | Refills: 0 | Status: SHIPPED | OUTPATIENT
Start: 2021-12-09 | End: 2021-12-13

## 2021-12-09 RX ADMIN — ATORVASTATIN CALCIUM 40 MG: 40 TABLET, FILM COATED ORAL at 08:39

## 2021-12-09 RX ADMIN — LIDOCAINE 1 PATCH: 246 PATCH TOPICAL at 08:42

## 2021-12-09 RX ADMIN — TAMSULOSIN HYDROCHLORIDE 0.4 MG: 0.4 CAPSULE ORAL at 09:05

## 2021-12-09 RX ADMIN — METHOCARBAMOL 500 MG: 500 TABLET ORAL at 08:40

## 2021-12-09 RX ADMIN — COLCHICINE 0.6 MG: 0.6 TABLET, FILM COATED ORAL at 08:39

## 2021-12-09 RX ADMIN — DICLOFENAC SODIUM 4 G: 10 GEL TOPICAL at 08:42

## 2021-12-09 RX ADMIN — AMLODIPINE BESYLATE 5 MG: 5 TABLET ORAL at 08:38

## 2021-12-09 RX ADMIN — ASPIRIN 81 MG: 81 TABLET, COATED ORAL at 08:39

## 2021-12-09 RX ADMIN — ACETAMINOPHEN 1000 MG: 500 TABLET, FILM COATED ORAL at 08:39

## 2021-12-09 RX ADMIN — DICLOFENAC SODIUM 4 G: 10 GEL TOPICAL at 00:01

## 2021-12-09 RX ADMIN — ALLOPURINOL 100 MG: 100 TABLET ORAL at 08:39

## 2021-12-09 RX ADMIN — INSULIN ASPART 3 UNITS: 100 INJECTION, SOLUTION INTRAVENOUS; SUBCUTANEOUS at 12:19

## 2021-12-09 RX ADMIN — INSULIN ASPART 1 UNITS: 100 INJECTION, SOLUTION INTRAVENOUS; SUBCUTANEOUS at 08:43

## 2021-12-09 RX ADMIN — FINASTERIDE 5 MG: 5 TABLET, FILM COATED ORAL at 08:39

## 2021-12-09 RX ADMIN — OXYCODONE HYDROCHLORIDE 5 MG: 5 TABLET ORAL at 05:20

## 2021-12-09 RX ADMIN — LIDOCAINE 1 PATCH: 246 PATCH TOPICAL at 08:41

## 2021-12-09 RX ADMIN — SILVER SULFADIAZINE: 10 CREAM TOPICAL at 08:42

## 2021-12-09 RX ADMIN — OXYCODONE HYDROCHLORIDE 10 MG: 5 TABLET ORAL at 08:50

## 2021-12-09 RX ADMIN — FERROUS GLUCONATE 324 MG: 324 TABLET ORAL at 08:39

## 2021-12-09 RX ADMIN — ISOSORBIDE MONONITRATE 30 MG: 30 TABLET, EXTENDED RELEASE ORAL at 08:40

## 2021-12-09 RX ADMIN — HEPARIN SODIUM 5000 UNITS: 5000 INJECTION INTRAVENOUS; SUBCUTANEOUS at 08:43

## 2021-12-09 ASSESSMENT — ACTIVITIES OF DAILY LIVING (ADL)
ADLS_ACUITY_SCORE: 15
ADLS_ACUITY_SCORE: 16
ADLS_ACUITY_SCORE: 15
ADLS_ACUITY_SCORE: 15
ADLS_ACUITY_SCORE: 16
ADLS_ACUITY_SCORE: 15

## 2021-12-09 NOTE — DISCHARGE SUMMARY
[facility]  Discharge Summary  Name: Lance Ibrahim    MRN: [id number]  YOB: 1945    Age: 76 year old  Date of Discharge:  12/9/2021 12:30 PM  Date of Admission: 12/3/2021  Primary Care Provider: Anh Spivey  Discharge Physician:  Tiffanie Barakat MD  Discharging Service:  Hospitalist      Discharge Diagnoses:  1.  CKD stage III with acute kidney injury and dehydration  2.  Acute on chronic low back pain with DDD  3.  Acute gout flare with right hand pain and swelling  4.  Right shoulder pain with calcific tendinitis status post steroid injection  5.  Hypertension  6.  Type 2 diabetes  7.  BPH  8.  Leukocytosis  9.  History of CVA  10.  Coronary artery disease     Follow-ups Needed After Discharge   Repeat BMP within one week  Follow up with PCP upon discharge from TCU  Follow up with ortho in clinic if shoulder pain not improving    Unresulted Labs Ordered in the Past 30 Days of this Admission     No orders found from 10/16/2018 to 12/16/2018.        Hospital Course:  Lance Ibrahim is a 76 year old male with past medical history of hypertension, history of CVA in 1993, coronary artery disease, hyperlipidemia, obstructive sleep apnea, type 2 diabetes mellitus, chronic kidney disease stage IIIa who presented on 12/3/2021 with chief complaint of back pain. States that he has been taking Aleve for his back pain.  In the emergency department patient was found to have a temperature of 97.1  F, heart rate 89, blood pressure 102/56, respiratory rate 18, SPO2 100% on room air.  Lab work revealed a creatinine of 3.6 with a baseline of 1.1, leukocytosis of 14.3, hemoglobin 11.7.  The patient had a CT lumbar spine that showed no evidence of acute fracture, degenerative changes in the lumbar spine. The patient also had a CT abdomen and pelvis showing no acute findings, hepatic steatosis, stable left adrenal benign adenoma, colonic diverticulosis.     At this point his kidney function has improved  back to baseline but he has fairly debilitating pain related to a gout flare up mainly in the right hand.  He was started on colchicine and underwent a right shoulder steroid injection on  as per orthopedics.     Overlying all of this his wife recently passed away and they are making arrangements for her .      He will discharge to TCU for further therapies prior to returning home.     Dehydration  Acute Kidney Injury  Chronic Kidney Disease Stage 3a  -Baseline Cr = 1.1-1.3.  Initially creatinine elevated at 3.38. Creatinine improved with IV fluids which were discontinued subsequently and creatinine remained stable.  On day of discharge creatinine was 1..25.  His Lisinopril and Torsemide were initially held but can be resumed upon discharge.  I do recommend that he have repeat BMP within one week to ensure creatinine remains stable with resumption of these medications.  He should not take NSAIDS.     Acute on Chronic Low Back Pain  Degenerative Disc Disease  -CT lumbar spine showed degenerative changes most pronounced at L5-S1 with severe right and moderate left neural foraminal narrowing  -PT/OT consulted, recommended TCU  -Pain management team consulted for pain control  -Will continue Tylenol, methocarbamol, Topical Voltaren gel     Acute Gout flare with right hand pain   -Complains of right hand pain. He stated that his symptoms are similar to his prior gout attacks but more severe.  -Patient stated that prednisone did not help much in the prior gout flares. Colchicine works for him.    -Started on colchicine 0.6 mg once daily on .  -Increasing colchicine to 0.6 mg twice daily on , will continue this for one week then decrease to daily dosing  -We will continue allopurinol  -Also has Oxycodone PRN for severe pain     Right shoulder pain with calcific tendinitis   -X-ray of the right shoulder done and showed degenerative changes and evidence of calcific tendinopathy.    -Orthopedic surgery  "consulted and he is received a right shoulder steroid injection on 12/8     Hypertension  -BP stable  -Initially torsemide and lisinopril held due to DEAN.  These are being resumed at discharge.  -Will continue amlodipine, Imdur     Diabetes Mellitus Type 2  - A1C = 7.5 on 11/1/2021  -On Metformin PTA.  This was held due to DEAN.  Resumed.       BPH  -We will continue home tamsulosin and finasteride     Leukocytosis: No infection identified. No evidence of infection, did not receive antibiotics.     Chronic Medical Problems:  Hx of CVA in 1993  Hyperlipidemia  Coronary Artery Disease     Discharge Disposition:  Discharged to short-term care facility     Allergies:  Allergies   Allergen Reactions     Penicillins Anaphylaxis     \"anaphylactic\"     Sulfa Drugs      \"itchy rash, swelling of face and hands\"     Ancef [Cefazolin] Rash        Condition on Discharge:  Discharge condition: Stable   Discharge vitals: Blood pressure (!) 151/77, pulse 79, temperature 97.5  F (36.4  C), resp. rate 22, height 1.829 m (6'), weight 105.6 kg (232 lb 14.4 oz), SpO2 98 %.   Code status on discharge: Full Code     History of Illness:  See detailed admission note for full details.    Physical Exam:  Blood pressure (!) 151/77, pulse 79, temperature 97.5  F (36.4  C), resp. rate 22, height 1.829 m (6'), weight 105.6 kg (232 lb 14.4 oz), SpO2 98 %.  Wt Readings from Last 1 Encounters:   12/03/21 105.6 kg (232 lb 14.4 oz)     General: Alert, awake, no acute distress.  HEENT: Normocephalic and atraumatic, eyes anicteric and without scleral injection, EOMI, face symmetric, MMM.  Cardiac: RRR, normal S1, S2. No m/g/r, no LE edema.  Pulmonary: Normal chest rise, normal work of breathing.  Lungs CTAB without crackles or wheezing.  Abdomen: soft, non-tender, non-distended.  Normoactive bowel sounds, no guarding or rebound tenderness.  Extremities: no deformities.  Warm, well perfused.  Skin: no rashes or lesions.  Warm and Dry. Right hand diffusely " swollen compared to the left.  Neuro: No focal deficits.  Speech clear.  Moving extremities in bed, decreased  strength of the right hand due to swelling and pain  Psych: Alert and oriented x3. Appropriate affect.    Procedures other than Imaging:  Right shoulder steroid injection     Imaging:  Results for orders placed or performed during the hospital encounter of 12/03/21   Abd/pelvis CT no contrast - Stone Protocol    Narrative    CT ABDOMEN PELVIS W/O CONTRAST 12/3/2021 2:47 PM    CLINICAL HISTORY: right flank pain  TECHNIQUE: CT scan of the abdomen and pelvis was performed without IV  contrast. Multiplanar reformats were obtained. Dose reduction  techniques were used.  CONTRAST: None.    COMPARISON: 1/24/2020    FINDINGS:   LOWER CHEST: Normal.    HEPATOBILIARY: Hepatic steatosis. No calcified gallstones or biliary  ductal dilatation.    PANCREAS: Normal.    SPLEEN: Normal.    ADRENAL GLANDS: Stable 1.8 cm left adrenal adenoma.    KIDNEYS/BLADDER: No urinary system calculi, hydroureteronephrosis or  perinephric stranding. Simple cyst at the upper pole of the left  kidney requiring no specific follow-up, stable. Decompressed urinary  bladder.    BOWEL: Sigmoid diverticulosis. No small bowel or colonic obstruction  or inflammatory changes. Normal appendix.    LYMPH NODES: Several small retroperitoneal and bilateral iliac lymph  nodes are stable. For example a right common iliac 8 mm lymph node  (series 8, image 25), left common iliac and millimeter lymph node  (series 8, image 14) and bilateral distal external iliac prominent  lymph nodes are unchanged. No new or enlarging lymph nodes.    VASCULATURE: No abdominal aortic aneurysm.    PELVIC ORGANS: Mild intraprostatic calcifications. Stable  fat-containing moderate-sized right inguinal hernia.    OTHER: No free fluid or fluid collections. Ventral abdominal mesh. No  free air.    MUSCULOSKELETAL: Mild degenerative changes in the spine. No suspicious  lesions  in the bones.      Impression    IMPRESSION:   1.  No acute findings in the abdomen and pelvis. No urinary system  calculi.  2.  Hepatic steatosis.  3.  Stable left adrenal benign adenoma.  4.  Colonic diverticulosis.  5.  Stable fat-containing right inguinal hernia.    KENDRA LOPEZ MD         SYSTEM ID:  [id number]   Lumbar spine CT w/o contrast    Narrative    CT LUMBAR SPINE WITHOUT CONTRAST  12/3/2021 2:46 PM     HISTORY: Back pain.      TECHNIQUE: Axial images of the lumbar spine were obtained without  intravenous contrast. Multiplanar reformations were performed.   Radiation dose for this scan was reduced using automated exposure  control, adjustment of the mA and/or kV according to patient size, or  iterative reconstruction technique.     COMPARISON: None.     FINDINGS:  There are 5 lumbar-type vertebral bodies assumed for the  purposes of this dictation.     Lumbar spine alignment is within normal limits. No loss of vertebral  body height. No evidence of fracture. No destructive bony lesion  appreciated.    Level by level as follows:     T12-L1: No loss of disc height. No significant disc herniation. Normal  facets. No spinal canal or neural foraminal narrowing.     L1-L2: No loss of disc height. No significant disc herniation.  Anterior osteophytic spurring. Normal facets. No spinal canal or  neural foraminal narrowing.     L2-L3: No loss of disc height. No significant disc herniation. Marked  anterior osteophytic spurring. Normal facets. No spinal canal or  neural foraminal narrowing.     L3-L4: Mild loss of disc height. Circumferential disc bulge with mild  endplate osteophytic spurring. Mild facet hypertrophy. No significant  spinal canal narrowing. Mild bilateral neural foraminal narrowing.     L4-L5: Moderate loss of disc height. Circumferential disc bulge with  endplate osteophytic spurring. Mild facet hypertrophy. Mild spinal  canal narrowing. Moderate right neural foraminal narrowing. Moderate  left  neural foraminal narrowing.     L5-S1: Marked loss of disc height with degenerative endplate changes.  Circumferential disc bulge with prominent endplate osteophytic  spurring. Moderate bilateral facet hypertrophy. Mild spinal canal  narrowing with narrowing of the lateral recesses bilaterally. Severe  right neural foraminal narrowing. Moderate left neural foraminal  narrowing.     Visualized paraspinous tissues: Unremarkable.       Impression    IMPRESSION:    1. No evidence of acute fracture or subluxation in the lumbar spine.  2. Degenerative changes in the lower lumbar spine as detailed above,  most pronounced at L5-S1.  3. At L5-S1, there is mild spinal canal narrowing with narrowing of  lateral recesses. Severe right and moderate left neural foraminal  narrowing.  4. At L4-L5, there is mild spinal canal narrowing as well as moderate  bilateral neural foraminal narrowing.     SULMA BENEDICT MD         SYSTEM ID:  [id number]   XR Shoulder Right G/E 3vw    Narrative    XR SHOULDER RIGHT G/E 3 VIEWS  12/6/2021 12:32 PM     HISTORY: right shoulder pain    COMPARISON: None      Impression    IMPRESSION: No acute fracture or malalignment. Mild glenohumeral and  acromioclavicular joint degenerative changes. Calcification adjacent  to the greater tuberosity suggests calcific tendinopathy. Osteopenia.  Chronic lung changes.    KEN WEEKS MD         SYSTEM ID:  [id number]   XR Joint Injection Major Right    Narrative    XR JOINT INJECTION MAJOR RIGHT                        12/8/2021 1:35  PM       HISTORY:  gout right shoulder with rotator cuff pathology - request  for subacromial cortisone injection    Procedure: Prior to the procedure, imaging and symptomatology were  reviewed. The procedure, indications, and its risks (including  bleeding, infection, and reaction to contrast and medications) were  explained to the patient and consent was obtained. Timeout was  performed. The overlying skin was prepped and draped,  and anesthetized  with 1% lidocaine.     Using sterile technique and fluoroscopic guidance, a 22 gauge needle  was placed into the right shoulder subacromial space.  A small amount  of contrast was injected to confirm position of the needle tip.   Subsequently, a total of 1 mL Kenalog (40 mg/mL and 3 mL 0.5%  bupivacaine were injected. The patient tolerated the procedure well  and there were no initial complications.       Fluoroscopy time: 0.4 minutes  Medications used: 3 mL 1% lidocaine local, 1.5 mL Isovue-M 200, 1 mL  Kenalog, 3 mL 0.5% bupivacaine  Number of Images: 1    The patients pain level (0-10 scale) were as follows:   PRE INJECTION    10   POST INJECTION  2-3        Impression    IMPRESSION:  Technically successful fluoroscopic guided right shoulder  subacromial injection      MAYRA WASSERMAN PA-C         SYSTEM ID:  [id number]        Consultations:  Consultations This Hospital Stay   CARE MANAGEMENT / SOCIAL WORK IP CONSULT  PHYSICAL THERAPY ADULT IP CONSULT  OCCUPATIONAL THERAPY ADULT IP CONSULT  [facility] IP CONSULT  PAIN MANAGEMENT ADULT IP CONSULT  ORTHOPEDIC SURGERY IP CONSULT  CARE MANAGEMENT / SOCIAL WORK IP CONSULT  CARE MANAGEMENT / SOCIAL WORK IP CONSULT  OCCUPATIONAL THERAPY ADULT IP CONSULT  PHYSICAL THERAPY ADULT IP CONSULT     Recent Lab Results:  Recent Labs   Lab 12/09/21  0603 12/08/21  0646 12/07/21  1248 12/06/21  0609   WBC  --  13.2* 14.4* 14.0*   HGB  --  10.7* 10.2* 10.8*   HCT  --  34.1* 33.1* 34.4*   MCV  --  93 94 93    372 336 323     Recent Labs   Lab 12/09/21  1217 12/09/21  0839 12/08/21  2108 12/08/21  0817 12/08/21  0646 12/07/21  1656 12/07/21  1209 12/06/21  0738 12/06/21  0609   NA  --   --   --   --  135  --  132*  --  135   POTASSIUM  --   --   --   --  3.7  --  4.1  --  4.0   CHLORIDE  --   --   --   --  99  --  98  --  103   CO2  --   --   --   --  26  --  26  --  26   ANIONGAP  --   --   --   --  10  --  8  --  6   * 143* 222*   < > 164*    < > 228*   < > 181*   BUN  --   --   --   --  13  --  15  --  19   CR  --   --   --   --  1.25  --  1.28*  --  1.45*   GFRESTIMATED  --   --   --   --  55*  --  54*  --  46*   SHANNAN  --   --   --   --  8.9  --  9.3  --  9.3    < > = values in this interval not displayed.          Pending Results:    Unresulted Labs Ordered in the Past 30 Days of this Admission     No orders found from 11/3/2021 to 12/4/2021.           Discharge Instructions and Follow-Up:   Discharge Orders      Care Coordination Referral      General info for SNF    Length of Stay Estimate: Short Term Care: Estimated # of Days <30  Condition at Discharge: Improving  Level of care:skilled   Rehabilitation Potential: Good  Admission H&P remains valid and up-to-date: Yes  Recent Chemotherapy: N/A  Use Nursing Home Standing Orders: Yes     Mantoux instructions    Give two-step Mantoux (PPD) Per Facility Policy Yes     Follow Up and recommended labs and tests    Follow up with PCP upon discharge from TCU.  Follow up with Orthopedics in clinic if your shoulder pain does not improve.     Reason for your hospital stay    You were hospitalized for significant pain and were found to have a gout flare.  You had a steroid injection of your shoulder and were started on Colchicine as well as pain medications.  You also had significantly elevated kidney function which has improved.  You will go to rehab for further therapy prior to returning home.     Glucose monitor nursing POCT    Before meals and at bedtime     Additional Discharge Instructions    Please repeat BMP on 12/13 to ensure stable kidney function.     Activity - Up with assistive device     Full Code     Occupational Therapy Adult Consult    Evaluate and treat as clinically indicated.    Reason:  gout, deconditioning     Physical Therapy Adult Consult    Evaluate and treat as clinically indicated.    Reason:  gout, deconditioning     Fall precautions     Diet    Follow this diet upon discharge:  Orders Placed This Encounter      Snacks/Supplements Adult: Ensure Enlive; With Meals      Combination Diet Regular Diet Adult     Discharge Medications   Current Discharge Medication List      START taking these medications    Details   acetaminophen (TYLENOL) 500 MG tablet Take 2 tablets (1,000 mg) by mouth every 8 hours as needed for mild pain    Associated Diagnoses: Idiopathic gout, unspecified chronicity, unspecified site      bisacodyl (DULCOLAX) 10 MG suppository Place 1 suppository (10 mg) rectally daily as needed for constipation    Associated Diagnoses: Other constipation      colchicine (COLCYRS) 0.6 MG tablet Take 1 tablet (0.6 mg) by mouth 2 times daily for 7 days, THEN 1 tablet (0.6 mg) daily.    Associated Diagnoses: Idiopathic gout, unspecified chronicity, unspecified site      diclofenac (VOLTAREN) 1 % topical gel Apply 4 g topically 4 times daily    Associated Diagnoses: Idiopathic gout, unspecified chronicity, unspecified site      Lidocaine (LIDOCARE) 4 % Patch Place 1 patch onto the skin every 24 hours To prevent lidocaine toxicity, patient should be patch free for 12 hrs daily.    Associated Diagnoses: Idiopathic gout, unspecified chronicity, unspecified site      methocarbamol (ROBAXIN) 500 MG tablet Take 1 tablet (500 mg) by mouth 4 times daily as needed for muscle spasms    Associated Diagnoses: Idiopathic gout, unspecified chronicity, unspecified site      oxyCODONE (ROXICODONE) 5 MG tablet Take 1-2 tablets (5-10 mg) by mouth every 4 hours as needed for moderate to severe pain  Qty: 25 tablet, Refills: 0    Associated Diagnoses: Idiopathic gout, unspecified chronicity, unspecified site      polyethylene glycol (MIRALAX) 17 GM/Dose powder Take 17 g by mouth daily  Qty: 510 g    Associated Diagnoses: Other constipation      senna-docusate (SENOKOT-S/PERICOLACE) 8.6-50 MG tablet Take 1 tablet by mouth 2 times daily as needed for constipation    Associated Diagnoses: Other constipation          CONTINUE these medications which have NOT CHANGED    Details   allopurinol (ZYLOPRIM) 100 MG tablet Take 1 tablet (100 mg) by mouth daily  Qty: 30 tablet, Refills: 3    Associated Diagnoses: Idiopathic gout, unspecified chronicity, unspecified site      amLODIPine (NORVASC) 5 MG tablet TAKE 1 TABLET BY MOUTH EVERY DAY  Qty: 90 tablet, Refills: 3    Associated Diagnoses: Essential hypertension      aspirin (ASA) 81 MG EC tablet Take 1 tablet (81 mg) by mouth daily  Qty: 100 tablet, Refills: 3    Associated Diagnoses: Essential hypertension      atorvastatin (LIPITOR) 40 MG tablet Take 1 tablet (40 mg) by mouth daily  Qty: 90 tablet, Refills: 3    Associated Diagnoses: Essential hypertension      Ferrous Gluconate 240 (27 Fe) MG TABS Take 1 tablet by mouth daily       finasteride (PROSCAR) 5 MG tablet Take 5 mg by mouth daily.      fluticasone (FLONASE) 50 MCG/ACT spray Spray 1 spray into both nostrils At Bedtime       isosorbide mononitrate (IMDUR) 30 MG 24 hr tablet Take 1 tablet (30 mg) by mouth daily  Qty: 90 tablet, Refills: 3    Associated Diagnoses: Dyspnea on exertion; Chest pain, unspecified type; Cardiomyopathy, unspecified type (H)      labetalol (NORMODYNE) 200 MG tablet TAKE 1 TABLET BY MOUTH TWICE A DAY  Qty: 180 tablet, Refills: 3    Comments: DX Code Needed  S.  Associated Diagnoses: Essential (primary) hypertension      lisinopril (ZESTRIL) 10 MG tablet TAKE 1 TABLET BY MOUTH EVERY DAY  Qty: 90 tablet, Refills: 1    Associated Diagnoses: Essential hypertension      loratadine (CLARITIN) 10 MG tablet Take 10 mg by mouth At Bedtime       metFORMIN (GLUCOPHAGE) 1000 MG tablet TAKE 1 TABLET BY MOUTH TWICE A DAY WITH MEALS  Qty: 180 tablet, Refills: 1    Associated Diagnoses: Type 2 diabetes mellitus without complication, without long-term current use of insulin (H)      Multiple Vitamins-Minerals (MULTIVITAMIN ADULTS PO) Take 1 tablet by mouth daily       silver sulfADIAZINE (SILVADENE) 1 % external  cream Apply topically daily To bilateral foot wounds      tamsulosin (FLOMAX) 0.4 MG 24 hr capsule Take 1 capsule by mouth daily.      torsemide (DEMADEX) 20 MG tablet TAKE 1 TABLET BY MOUTH EVERY DAY  Qty: 90 tablet, Refills: 2    Associated Diagnoses: Essential hypertension      ONETOUCH ULTRA test strip TEST DAILY OR AS DIRECTED  Qty: 100 strip, Refills: 3    Associated Diagnoses: Type 2 diabetes mellitus without complication, without long-term current use of insulin (H)             Time Spent on this Encounter   I, Tiffanie Barakat MD, personally saw the patient today and spent greater than 30 minutes discharging this patient.    Tiffanie Barakat MD

## 2021-12-09 NOTE — PROGRESS NOTES
VSS. Pt alert and oriented x 4. Up assist of 1 with walker, gait belt. Received PRN oral dialudid x 1 for R shoulder pain which was effective. PIV SL. Tolerating diet, good appetite. BG 91 and 222. Pt had BM x 3. Voiding adequately. Discharge pending.

## 2021-12-09 NOTE — PLAN OF CARE
Oxy, tyl, lidocaine patches relieving shoulder and mid back pain to 5/10.   and 263 insulin corrected according to scale.  Remains assist of one when in/out bed. Meeting expected goals for discharge. Pt agreeable and accepting to transfer.  Transferred to TCU around 1215.  Report given to receiving nurse at [facility].

## 2021-12-09 NOTE — PLAN OF CARE
Rated right shoulder pain as 7 and right hand pain as 7 down to 5. Medicated with Oxycodone and was abler to rest. Using heating pad shoulder.  Right hand has scattered red patches with swelling. Right foot has gauze which is dry. Wearing pull up. Plan for PT/OT. Care coordinator working on TCU discharge. On falls precautions with assist of one.

## 2021-12-09 NOTE — PLAN OF CARE
Physical Therapy Discharge Summary    Reason for therapy discharge:    Discharged to transitional care facility.    Progress towards therapy goal(s). See goals on Care Plan in [facility] electronic health record for goal details.  Goals not met.  Barriers to achieving goals:   discharge from facility.    Therapy recommendation(s):    Continued therapy is recommended.  Rationale/Recommendations:  PT eval and treat at TCU.      **Pt not seen by discharging therapist on this date, note written based on previous treating therapist's notes and recommendations

## 2021-12-09 NOTE — PLAN OF CARE
Occupational Therapy Discharge Summary    Reason for therapy discharge:    Discharged to transitional care facility.    Progress towards therapy goal(s). See goals on Care Plan in [facility] electronic health record for goal details.  Goals not met.  Barriers to achieving goals:   discharge from facility.    Therapy recommendation(s):    Continued therapy is recommended.  Rationale/Recommendations:  OT eval and treat at TCU.      **Pt not seen by discharging therapist on this date, note written based on previous treating therapist's notes and recommendations

## 2021-12-09 NOTE — PROGRESS NOTES
SPIRITUAL HEALTH SERVICES Progress Note  [facility] Peds Unit - Adult Overflow    Saw pt Lance for an emotional support check-in.  Lance was finishing his breakfast but welcomed  support.  He reported that he is transitioning to a TCU in a couple of hours.  His step-son Omari called during our conversation.  Afterwards, Lance expressed gratitude for Omari's support and talked about Omari's work and family.  Lance denied having any immediate concerns but reflected briefly that it is difficult for him to do the simplest things, including eating his breakfast.  Provided emotional support through empathetic listening and validation of life experience.  Lance welcomed prayer.    Plan: No further plans as pt expects to discharge soon.    Aaron Biggs M.Div., [facility]  Staff   Phone [phone]

## 2021-12-09 NOTE — PROGRESS NOTES
Care Management Discharge Note    Discharge Date: 12/09/2021     Discharge Disposition:  [facility]    Discharge Services:  PT, OT    Discharge DME:  None    Discharge Transportation:  [employer] [facility] at 11:30am via wheelchair    Private pay costs discussed: private room/amenity fees and transportation costs    PAS Confirmation Code:  [id number]  Patient/family educated on Medicare website which has current facility and service quality ratings:  Yes    Education Provided on the Discharge Plan:  Yes  Persons Notified of Discharge Plans: Bedside RN, patient, CHRISTY pacheco  Patient/Family in Agreement with the Plan:  Yes    Handoff Referral Completed: No    Additional Information:  CHRISTY updated by SW that [facility] has received authorization. Contacted Delilah - Admissions at [facility] ([phone]). She stated an early afternoon admission would be best. They will be admitted patient to a shared room to start with then move him to a private room when available. Met with patient at bedside. Introduced self & role. Updated him on room status. He was agreeable to a shared room but wanted a private because he snores so loudly. Contacted  [employer] [facility] ([phone]) spoke with Juliet. Scheduled an 1130am transport. Updated bedside RNDelilah @ [facility] & hospitalist. Requested discharge orders from hospitalist. Contacted patient's Omari pacheco ([phone]). He is agreeable to arrangements made. He will meet patient at [facility]. [facility]     RN CC will continue to follow for discharge planning.    Darling Lai RN, BSN, CPHN, CM  Inpatient Care Coordination - [facility]  [phone]

## 2021-12-10 ENCOUNTER — PATIENT OUTREACH (OUTPATIENT)
Dept: NURSING | Facility: CLINIC | Age: 76
End: 2021-12-10
Attending: INTERNAL MEDICINE
Payer: MEDICARE

## 2021-12-10 DIAGNOSIS — M54.6 ACUTE RIGHT-SIDED THORACIC BACK PAIN: ICD-10-CM

## 2021-12-10 NOTE — LETTER
[facility]   To:             Please give to facility    From:   Sheila Woodard RN  Care Coordinator   [facility]   P: [phone] [email or web address]   Patient Name:  Lance Ibrahim   YOB: 1945     Admit date: 12/9/21      *Information Needed:  Please contact me when the patient will discharge (or if they will move to long term care)- include the discharge date, disposition, and main diagnosis   - If the patient is discharged with home care services, please provide the name of the agency    Also- Please inform me if a care conference is being held.   Phone, Fax or Email with information                   Thank you

## 2021-12-10 NOTE — PROGRESS NOTES
Clinic Care Coordination Contact  Care Coordination Transition Communication         Clinical Data: Patient was hospitalized at [facility] from 12/3/21 to 12/9/21 with diagnosis of CKD stage III with acute kidney injury and dehydration  2.  Acute on chronic low back pain with DDD  3.  Acute gout flare with right hand pain and swelling  4.  Right shoulder pain with calcific tendinitis status post steroid injection  5.  Hypertension  6.  Type 2 diabetes.     Transition to Facility:              Facility Name: [facility]              Contact name and phone number/fax: [phone]    Plan: RN/SW Care Coordinator will await notification from facility staff informing RN/SW Care Coordinator of patient's discharge plans/needs. RN/SW Care Coordinator will review chart and outreach to facility staff every 4 weeks and as needed.     Sheila Woodard RN Care Coordinator  [employer]  Email: [email or web address]  Phone: [phone]

## 2021-12-12 ENCOUNTER — LAB REQUISITION (OUTPATIENT)
Dept: LAB | Facility: CLINIC | Age: 76
End: 2021-12-12
Payer: COMMERCIAL

## 2021-12-12 DIAGNOSIS — I10 ESSENTIAL (PRIMARY) HYPERTENSION: ICD-10-CM

## 2021-12-13 ENCOUNTER — TRANSITIONAL CARE UNIT VISIT (OUTPATIENT)
Dept: GERIATRICS | Facility: CLINIC | Age: 76
End: 2021-12-13
Payer: COMMERCIAL

## 2021-12-13 VITALS
WEIGHT: 232 LBS | BODY MASS INDEX: 31.42 KG/M2 | RESPIRATION RATE: 18 BRPM | OXYGEN SATURATION: 95 % | TEMPERATURE: 97.8 F | HEIGHT: 72 IN | DIASTOLIC BLOOD PRESSURE: 80 MMHG | HEART RATE: 90 BPM | SYSTOLIC BLOOD PRESSURE: 135 MMHG

## 2021-12-13 DIAGNOSIS — F43.21 GRIEF REACTION: ICD-10-CM

## 2021-12-13 DIAGNOSIS — I25.10 CORONARY ARTERY DISEASE INVOLVING NATIVE CORONARY ARTERY OF NATIVE HEART WITHOUT ANGINA PECTORIS: ICD-10-CM

## 2021-12-13 DIAGNOSIS — M54.50 CHRONIC LOW BACK PAIN WITHOUT SCIATICA, UNSPECIFIED BACK PAIN LATERALITY: ICD-10-CM

## 2021-12-13 DIAGNOSIS — N18.30 STAGE 3 CHRONIC KIDNEY DISEASE, UNSPECIFIED WHETHER STAGE 3A OR 3B CKD (H): ICD-10-CM

## 2021-12-13 DIAGNOSIS — E86.0 DEHYDRATION: ICD-10-CM

## 2021-12-13 DIAGNOSIS — M10.00 ACUTE IDIOPATHIC GOUT, UNSPECIFIED SITE: ICD-10-CM

## 2021-12-13 DIAGNOSIS — Z92.241 STATUS POST EPIDURAL STEROID INJECTION: ICD-10-CM

## 2021-12-13 DIAGNOSIS — I10 ESSENTIAL HYPERTENSION: ICD-10-CM

## 2021-12-13 DIAGNOSIS — M65.20 CALCIFIC TENDINITIS: ICD-10-CM

## 2021-12-13 DIAGNOSIS — Z86.73 HISTORY OF CVA (CEREBROVASCULAR ACCIDENT): ICD-10-CM

## 2021-12-13 DIAGNOSIS — E11.9 TYPE 2 DIABETES, HBA1C GOAL < 7% (H): ICD-10-CM

## 2021-12-13 DIAGNOSIS — G89.29 CHRONIC LOW BACK PAIN WITHOUT SCIATICA, UNSPECIFIED BACK PAIN LATERALITY: ICD-10-CM

## 2021-12-13 DIAGNOSIS — N17.9 ACUTE KIDNEY INJURY (H): Primary | ICD-10-CM

## 2021-12-13 DIAGNOSIS — M10.00 IDIOPATHIC GOUT, UNSPECIFIED CHRONICITY, UNSPECIFIED SITE: ICD-10-CM

## 2021-12-13 DIAGNOSIS — N40.0 BENIGN PROSTATIC HYPERPLASIA WITHOUT LOWER URINARY TRACT SYMPTOMS: ICD-10-CM

## 2021-12-13 DIAGNOSIS — R53.81 PHYSICAL DECONDITIONING: ICD-10-CM

## 2021-12-13 DIAGNOSIS — M25.511 ACUTE PAIN OF RIGHT SHOULDER: ICD-10-CM

## 2021-12-13 DIAGNOSIS — M79.641 RIGHT HAND PAIN: ICD-10-CM

## 2021-12-13 DIAGNOSIS — M50.30 DDD (DEGENERATIVE DISC DISEASE), CERVICAL: ICD-10-CM

## 2021-12-13 DIAGNOSIS — D72.829 LEUKOCYTOSIS, UNSPECIFIED TYPE: ICD-10-CM

## 2021-12-13 LAB
ANION GAP SERPL CALCULATED.3IONS-SCNC: 12 MMOL/L (ref 5–18)
BUN SERPL-MCNC: 30 MG/DL (ref 8–28)
CALCIUM SERPL-MCNC: 9.6 MG/DL (ref 8.5–10.5)
CHLORIDE BLD-SCNC: 104 MMOL/L (ref 98–107)
CO2 SERPL-SCNC: 22 MMOL/L (ref 22–31)
CREAT SERPL-MCNC: 1.38 MG/DL (ref 0.7–1.3)
GFR SERPL CREATININE-BSD FRML MDRD: 49 ML/MIN/1.73M2
GLUCOSE BLD-MCNC: 128 MG/DL (ref 70–125)
POTASSIUM BLD-SCNC: 4.3 MMOL/L (ref 3.5–5)
SODIUM SERPL-SCNC: 138 MMOL/L (ref 136–145)

## 2021-12-13 PROCEDURE — 80048 BASIC METABOLIC PNL TOTAL CA: CPT | Mod: ORL | Performed by: INTERNAL MEDICINE

## 2021-12-13 PROCEDURE — P9604 ONE-WAY ALLOW PRORATED TRIP: HCPCS | Mod: ORL | Performed by: INTERNAL MEDICINE

## 2021-12-13 PROCEDURE — 36415 COLL VENOUS BLD VENIPUNCTURE: CPT | Mod: ORL | Performed by: INTERNAL MEDICINE

## 2021-12-13 PROCEDURE — 99310 SBSQ NF CARE HIGH MDM 45: CPT | Performed by: NURSE PRACTITIONER

## 2021-12-13 RX ORDER — OXYCODONE HYDROCHLORIDE 5 MG/1
5-10 TABLET ORAL EVERY 4 HOURS PRN
Qty: 60 TABLET | Refills: 0 | Status: SHIPPED | OUTPATIENT
Start: 2021-12-13 | End: 2022-02-01

## 2021-12-13 ASSESSMENT — MIFFLIN-ST. JEOR: SCORE: 1815.35

## 2021-12-13 NOTE — PROGRESS NOTES
[facility] GERIATRIC SERVICES    PRIMARY CARE PROVIDER AND CLINIC:  An[facility] Spivey NP[facility] [address] [facility] [address]  Chief Complaint   Patient presents with     Hospital F/U      [place] Medical Record Number:  [id number]  Place of Service where encounter took place:  [facility] [[id number]]    Lance Ibrahim  is a 76 year old  (1945), admitted to the above facility from  [facility]. Hospital stay 12/3/21 through 12/9/21..   HPI:      76 y.o female admitted to TCU for acute rehab and medical management following hospitalization for back pain for which he was reportedly taking ibuprofen at home.  CT lumbar spine showed degenerative changes most pronounced at L5-S1 with severe right and moderate left neural foraminal narrowing. Also found to have DEAN with Cr of 3.6, baseline  1.1, and leukocytosis, and gout flare to right hand and right shoulder pain. X-ray of right shoulder revealed degenerative changes and evidence of calcific tendinopathy. Patient was started on colchicine and underwent right shoulder steroid injection 12/7 per orthopedics. Renal function improved with IVF and thought to be 2/2 dehydration. Lisinopril and Torsemide were held but resumed upon discharge. NSAIDs have been discouraged. Pain team consulted and pain regimen added. PMH notable for CAD, HTN, HLD, DM type 2, BPH, H/o CVA 1993. Of note patient also recently lost his wife.     Today patient is found in room sitting up in bed. Reports moderate pain to right neck and severe pain yet to right hand. States though swelling and mobility is slightly better, the pain is not improved since starting on the colchicine. Also reports noting frequent liquid stools yesterday and today. Denies n/v or cramping or abdominal pain. Denies SOB, chest pain or dizziness. Reports h/o of some chronic LE edema, but states has not required compression the last couple of years. States appetite is ok and sleeping ok. Reports  "sadness over recent loss of his wife. States family is supportive. Plans are being made to sell their home and patient states plans to move to A/L at discharge. Son has helped him pick one out in [place]. Patient reports being open to talking with ACP.       CODE STATUS/ADVANCE DIRECTIVES DISCUSSION:  Full Code  CPR/Full code   ALLERGIES:   Allergies   Allergen Reactions     Penicillins Anaphylaxis     \"anaphylactic\"     Sulfa Drugs      \"itchy rash, swelling of face and hands\"     Ancef [Cefazolin] Rash      PAST MEDICAL HISTORY:   Past Medical History:   Diagnosis Date     Arthritis     osteoarthritis knees     BPH      CAD (coronary artery disease)     subtotal occlusion in the small distal LAD      Cardiomyopathy      Cerebral artery occlusion with cerebral infarction     TIA 1993, no residual     Chronic kidney disease      Chronic rhinitis      HTN (hypertension)      Hyperlipidemia      Kidney stone      PVD (peripheral vascular disease)      Sleep apnea     doesn't use cpap     TIA (transient ischaemic attack) 1993     Type 2 diabetes mellitus - 11/08/2018     Ureteral stone       PAST SURGICAL HISTORY:   has a past surgical history that includes Diastasis recti repair (1985); Ventral hernia repair NOS (1987); ORIF Shoulder (Left); Colonoscopy (03/09/2013); hernia repair (07/13/2004); Foot surgery (04/2013); Amputate toe(s) (Left, 10/15/2018); Arthroplasty knee (Right, 12/07/2018); Heart Catheterization with Possible Intervention (Left, 03/05/2019); Extracapsular cataract extration with intraocular lens implant (Left, 03/13/2017); and Extracapsular cataract extration with intraocular lens implant (Right).  FAMILY HISTORY: family history includes Alcohol/Drug in his paternal grandfather; Cancer in his father; Diabetes in his maternal grandfather; Family History Negative in his mother.  SOCIAL HISTORY:   reports that he quit smoking about 28 years ago. He smoked 0.00 packs per day. He has never used smokeless " tobacco. He reports previous alcohol use. He reports that he does not use drugs.  Patient's living condition: lives alone    Post Discharge Medication Reconciliation Status: discharge medications reconciled and changed, per note/orders  Current Outpatient Medications   Medication Sig     acetaminophen (TYLENOL) 500 MG tablet Take 2 tablets (1,000 mg) by mouth every 8 hours as needed for mild pain     allopurinol (ZYLOPRIM) 100 MG tablet Take 1 tablet (100 mg) by mouth daily     aspirin (ASA) 81 MG EC tablet Take 1 tablet (81 mg) by mouth daily     atorvastatin (LIPITOR) 40 MG tablet Take 1 tablet (40 mg) by mouth daily     diclofenac (VOLTAREN) 1 % topical gel Apply 4 g topically 4 times daily     Ferrous Gluconate 240 (27 Fe) MG TABS Take 1 tablet by mouth daily      finasteride (PROSCAR) 5 MG tablet Take 5 mg by mouth daily.     fluticasone (FLONASE) 50 MCG/ACT spray Spray 1 spray into both nostrils At Bedtime      insulin aspart (NOVOLOG PEN) 100 UNIT/ML pen if 70 - 149 = 0 No insulin; 150 - 199 = 2 Units Give 2 Units; 200 - 249 = 4 Units Give 4 Units; 250 - 299 = 6 Units Give 6 Units; 300 - 349 = 8 Units Give 8 Units; 350 - 399 = 10 Units Give 10 Units; 400+ = Call Call provider for BS greater than 400., subcutaneously before meals related to GOUT, UNSPECIFIED (M10.9) until 12/26/2021 00:00 Use sliding scale while on prednisone     isosorbide mononitrate (IMDUR) 30 MG 24 hr tablet Take 1 tablet (30 mg) by mouth daily     labetalol (NORMODYNE) 200 MG tablet TAKE 1 TABLET BY MOUTH TWICE A DAY     Lidocaine (LIDOCARE) 4 % Patch Place 1 patch onto the skin every 24 hours To prevent lidocaine toxicity, patient should be patch free for 12 hrs daily.     lisinopril (ZESTRIL) 10 MG tablet TAKE 1 TABLET BY MOUTH EVERY DAY     loratadine (CLARITIN) 10 MG tablet Take 10 mg by mouth At Bedtime      metFORMIN (GLUCOPHAGE) 1000 MG tablet TAKE 1 TABLET BY MOUTH TWICE A DAY WITH MEALS     methocarbamol (ROBAXIN) 500 MG tablet  Take 1 tablet (500 mg) by mouth 4 times daily as needed for muscle spasms     Multiple Vitamins-Minerals (MULTIVITAMIN ADULTS PO) Take 1 tablet by mouth daily      oxyCODONE (ROXICODONE) 5 MG tablet Take 1-2 tablets (5-10 mg) by mouth every 4 hours as needed for moderate to severe pain     polyethylene glycol (MIRALAX) 17 GM/Dose powder Take 17 g by mouth daily     predniSONE (DELTASONE) 10 MG tablet 40 mg daily x 4 days, 20 mg daily x 4 days, 10 mg daily x 4 days then stop 12/26     senna-docusate (SENOKOT-S/PERICOLACE) 8.6-50 MG tablet Take 1 tablet by mouth 2 times daily as needed for constipation     tamsulosin (FLOMAX) 0.4 MG 24 hr capsule Take 1 capsule by mouth daily.     torsemide (DEMADEX) 20 MG tablet TAKE 1 TABLET BY MOUTH EVERY DAY     bisacodyl (DULCOLAX) 10 MG suppository Place 1 suppository (10 mg) rectally daily as needed for constipation     ONETOUCH ULTRA test strip TEST DAILY OR AS DIRECTED     silver sulfADIAZINE (SILVADENE) 1 % external cream Apply topically daily To bilateral foot wounds     No current facility-administered medications for this visit.       ROS:  10 point ROS of systems including Constitutional, Eyes, Respiratory, Cardiovascular, Gastroenterology, Genitourinary, Integumentary, Musculoskeletal, Psychiatric were all negative except for pertinent positives noted in my HPI.    Vitals:  /80   Pulse 90   Temp 97.8  F (36.6  C)   Resp 18   Ht 1.829 m (6')   Wt 105.2 kg (232 lb)   SpO2 95%   BMI 31.46 kg/m    Exam:    GENERAL APPEARANCE: Alert, in no distress   ENT: Mouth and posterior oropharynx normal, moist mucous membranes   EYES: EOM, conjunctivae, lids, pupils and irises normal   NECK: No adenopathy,masses or thyromegaly   RESP: respiratory effort and palpation of chest normal, Lungs clear to auscultation  CV: VS as above, 1+ right hand edema  ABDOMEN: normal bowel sounds, soft, nontender, no hepatosplenomegaly or other masses   : palpation of bladder WNL   M/S:  Gait and station normal   Digits and nails normal - DUMONT- stiffness right hand/fingers  SKIN: Inspection of skin and subcutaneous tissue baseline, Palpation of skin and subcutaneous tissue baseline  NEURO: Cranial nerves 2-12 are grossly at patient's baseline, Examination of sensation by touch normal   PSYCH: oriented X 3, affect and mood normal             Lab/Diagnostic data:  Recent labs in [facility] reviewed by me today.     ASSESSMENT/PLAN:    Acute kidney injury (H)  - peak Cr 3.38- thought 2/2 dehydration/NSAIDs- resolved with IVF  Stage 3 chronic kidney disease, unspecified whether stage 3a or 3b CKD (H)  - baseline Cr 1.1 - 1.3- in this range at hospital discharge  - recheck BMP this week to ensure stability  - avoid nephrotoxins      Dehydration  - resolved    Acute idiopathic goutRight hand pain  Right hand pain  -colchicine BID, causing diarrhea and not effectively treating gout flare  - discontinue colchicine  - add prednisone taper  - refill oxyCODONE (ROXICODONE) 5 MG tablet; Take 1-2 tablets (5-10 mg) by mouth every 4 hours as needed for moderate to severe pain  - follow clinically  Diarrhea  - likely 2/2 increased colchicine dosing- no associated s/s  - discontinue colchicine  - if persists after colchicine stopped, collect stool and send for c-diff toxin    Chronic low back pain without sciatica, unspecified back pain laterality  DDD (degenerative disc disease), cervical  - chronic- pain team consulted  - continue acetaminophen, Voltaren gel, lidocaine and prn oxycodone and Robaxin  - PT    Calcific tendinitis  -Acute pain of right shoulder  Status post epidural steroid injection   oxyCODONE (ROXICODONE) 5 MG tablet; Take 1-2 tablets (5-10 mg) by mouth every 4 hours as needed for moderate to severe pain  - continue on pain regimen as outlined  - PT/OT  - follow clinically    Essential hypertension  - chronic, limited data  - continue on labetalol, lisinopril, amlodipine Torsemide  - recheck BMP this  week  - VS per unit protocol    Type 2 diabetes, HbA1c goal < 7% (H)  Lab Results   Component Value Date    A1C 7.5 11/01/2021    A1C 8.1 04/29/2021    A1C 7.1 07/27/2020    A1C 7.7 02/19/2020    A1C 6.3 03/25/2019    A1C 6.4 10/17/2018     - on Metformin  - will add TID sliding scale insulin while on prednisone taper  - BGL AC    Benign prostatic hyperplasia without lower urinary tract symptoms  - voiding without issue  - continue on tamsulosin and finasteride    Leukocytosis, unspecified type  No infection noted inpatient. No antibiotics inpatient  - recheck CBC this week to ensure stability  - monitor VS    Coronary artery disease involving native coronary artery of native heart without angina pectoris  - no active s/s.   - continue on beta blocker, ACE, ASA      History of CVA (cerebrovascular accident)  1993-  - continues on ASA, statin    Physical deconditioning  - 2/2 above  - PT/OT to optimize function, safety and independence  - Supportive cares and treatments      Grief reaction  - recent loss of wife, now medical issues and moving from family home. Family is supportive  - open to visiting with ACP  - monitor mood and behaviors  - supportive approach    Orders:  Orders written on the unit.      Total unit floor time 48 minutes- Time consisted of examination of patient, review of patient medical records, labs, imaging and current admission orders/clarification of orders and documentation. 25  minutes spent on counseling and care coordination with patient and nursing regarding clinical status and plan of care.           Electronically signed by:  RANJAN Dougherty CNP

## 2021-12-13 NOTE — LETTER
12/13/2021        RE: Lance Ibrahim  [address]  [place] [address]        [facility]    PRIMARY CARE PROVIDER AND CLINIC:  Anh Spivey NP, [address] / [place] [address]  Chief Complaint   Patient presents with     Hospital F/U      [facility] Medical Record Number:  [id number]  Place of Service where encounter took place:  [facility] ([facility]) [[id number]]    Lance Ibrahim  is a 76 year old  (1945), admitted to the above facility from  [facility]. Hospital stay 12/3/21 through 12/9/21..   HPI:      76 y.o female admitted to TCU for acute rehab and medical management following hospitalization for back pain for which he was reportedly taking ibuprofen at home.  CT lumbar spine showed degenerative changes most pronounced at L5-S1 with severe right and moderate left neural foraminal narrowing. Also found to have DEAN with Cr of 3.6, baseline  1.1, and leukocytosis, and gout flare to right hand and right shoulder pain. X-ray of right shoulder revealed degenerative changes and evidence of calcific tendinopathy. Patient was started on colchicine and underwent right shoulder steroid injection 12/7 per orthopedics. Renal function improved with IVF and thought to be 2/2 dehydration. Lisinopril and Torsemide were held but resumed upon discharge. NSAIDs have been discouraged. Pain team consulted and pain regimen added. PMH notable for CAD, HTN, HLD, DM type 2, BPH, H/o CVA 1993. Of note patient also recently lost his wife.     Today patient is found in room sitting up in bed. Reports moderate pain to right neck and severe pain yet to right hand. States though swelling and mobility is slightly better, the pain is not improved since starting on the colchicine. Also reports noting frequent liquid stools yesterday and today. Denies n/v or cramping or abdominal pain. Denies SOB, chest pain or dizziness. Reports h/o of some chronic LE edema, but states has not  "required compression the last couple of years. States appetite is ok and sleeping ok. Reports sadness over recent loss of his wife. States family is supportive. Plans are being made to sell their home and patient states plans to move to [place] at discharge. Son has helped him pick one out in [place]. Patient reports being open to talking with ACP.       CODE STATUS/ADVANCE DIRECTIVES DISCUSSION:  Full Code  CPR/Full code   ALLERGIES:   Allergies   Allergen Reactions     Penicillins Anaphylaxis     \"anaphylactic\"     Sulfa Drugs      \"itchy rash, swelling of face and hands\"     Ancef [Cefazolin] Rash      PAST MEDICAL HISTORY:   Past Medical History:   Diagnosis Date     Arthritis     osteoarthritis knees     BPH      CAD (coronary artery disease)     subtotal occlusion in the small distal LAD      Cardiomyopathy      Cerebral artery occlusion with cerebral infarction     TIA 1993, no residual     Chronic kidney disease      Chronic rhinitis      HTN (hypertension)      Hyperlipidemia      Kidney stone      PVD (peripheral vascular disease)      Sleep apnea     doesn't use cpap     TIA (transient ischaemic attack) 1993     Type 2 diabetes mellitus - 11/08/2018     Ureteral stone       PAST SURGICAL HISTORY:   has a past surgical history that includes Diastasis recti repair (1985); Ventral hernia repair NOS (1987); ORIF Shoulder (Left); Colonoscopy (03/09/2013); hernia repair (07/13/2004); Foot surgery (04/2013); Amputate toe(s) (Left, 10/15/2018); Arthroplasty knee (Right, 12/07/2018); Heart Catheterization with Possible Intervention (Left, 03/05/2019); Extracapsular cataract extration with intraocular lens implant (Left, 03/13/2017); and Extracapsular cataract extration with intraocular lens implant (Right).  FAMILY HISTORY: family history includes Alcohol/Drug in his paternal grandfather; Cancer in his father; Diabetes in his maternal grandfather; Family History Negative in his mother.  SOCIAL HISTORY:   reports that " he quit smoking about 28 years ago. He smoked 0.00 packs per day. He has never used smokeless tobacco. He reports previous alcohol use. He reports that he does not use drugs.  Patient's living condition: lives alone    Post Discharge Medication Reconciliation Status: discharge medications reconciled and changed, per note/orders  Current Outpatient Medications   Medication Sig     acetaminophen (TYLENOL) 500 MG tablet Take 2 tablets (1,000 mg) by mouth every 8 hours as needed for mild pain     allopurinol (ZYLOPRIM) 100 MG tablet Take 1 tablet (100 mg) by mouth daily     aspirin (ASA) 81 MG EC tablet Take 1 tablet (81 mg) by mouth daily     atorvastatin (LIPITOR) 40 MG tablet Take 1 tablet (40 mg) by mouth daily     diclofenac (VOLTAREN) 1 % topical gel Apply 4 g topically 4 times daily     Ferrous Gluconate 240 (27 Fe) MG TABS Take 1 tablet by mouth daily      finasteride (PROSCAR) 5 MG tablet Take 5 mg by mouth daily.     fluticasone (FLONASE) 50 MCG/ACT spray Spray 1 spray into both nostrils At Bedtime      insulin aspart (NOVOLOG PEN) 100 UNIT/ML pen if 70 - 149 = 0 No insulin; 150 - 199 = 2 Units Give 2 Units; 200 - 249 = 4 Units Give 4 Units; 250 - 299 = 6 Units Give 6 Units; 300 - 349 = 8 Units Give 8 Units; 350 - 399 = 10 Units Give 10 Units; 400+ = Call Call provider for BS greater than 400., subcutaneously before meals related to GOUT, UNSPECIFIED (M10.9) until 12/26/2021 00:00 Use sliding scale while on prednisone     isosorbide mononitrate (IMDUR) 30 MG 24 hr tablet Take 1 tablet (30 mg) by mouth daily     labetalol (NORMODYNE) 200 MG tablet TAKE 1 TABLET BY MOUTH TWICE A DAY     Lidocaine (LIDOCARE) 4 % Patch Place 1 patch onto the skin every 24 hours To prevent lidocaine toxicity, patient should be patch free for 12 hrs daily.     lisinopril (ZESTRIL) 10 MG tablet TAKE 1 TABLET BY MOUTH EVERY DAY     loratadine (CLARITIN) 10 MG tablet Take 10 mg by mouth At Bedtime      metFORMIN (GLUCOPHAGE) 1000 MG  tablet TAKE 1 TABLET BY MOUTH TWICE A DAY WITH MEALS     methocarbamol (ROBAXIN) 500 MG tablet Take 1 tablet (500 mg) by mouth 4 times daily as needed for muscle spasms     Multiple Vitamins-Minerals (MULTIVITAMIN ADULTS PO) Take 1 tablet by mouth daily      oxyCODONE (ROXICODONE) 5 MG tablet Take 1-2 tablets (5-10 mg) by mouth every 4 hours as needed for moderate to severe pain     polyethylene glycol (MIRALAX) 17 GM/Dose powder Take 17 g by mouth daily     predniSONE (DELTASONE) 10 MG tablet 40 mg daily x 4 days, 20 mg daily x 4 days, 10 mg daily x 4 days then stop 12/26     senna-docusate (SENOKOT-S/PERICOLACE) 8.6-50 MG tablet Take 1 tablet by mouth 2 times daily as needed for constipation     tamsulosin (FLOMAX) 0.4 MG 24 hr capsule Take 1 capsule by mouth daily.     torsemide (DEMADEX) 20 MG tablet TAKE 1 TABLET BY MOUTH EVERY DAY     bisacodyl (DULCOLAX) 10 MG suppository Place 1 suppository (10 mg) rectally daily as needed for constipation     ONETOUCH ULTRA test strip TEST DAILY OR AS DIRECTED     silver sulfADIAZINE (SILVADENE) 1 % external cream Apply topically daily To bilateral foot wounds     No current facility-administered medications for this visit.       ROS:  10 point ROS of systems including Constitutional, Eyes, Respiratory, Cardiovascular, Gastroenterology, Genitourinary, Integumentary, Musculoskeletal, Psychiatric were all negative except for pertinent positives noted in my HPI.    Vitals:  /80   Pulse 90   Temp 97.8  F (36.6  C)   Resp 18   Ht 1.829 m (6')   Wt 105.2 kg (232 lb)   SpO2 95%   BMI 31.46 kg/m    Exam:    GENERAL APPEARANCE: Alert, in no distress   ENT: Mouth and posterior oropharynx normal, moist mucous membranes   EYES: EOM, conjunctivae, lids, pupils and irises normal   NECK: No adenopathy,masses or thyromegaly   RESP: respiratory effort and palpation of chest normal, Lungs clear to auscultation  CV: VS as above, 1+ right hand edema  ABDOMEN: normal bowel sounds,  soft, nontender, no hepatosplenomegaly or other masses   : palpation of bladder WNL   M/S: Gait and station normal   Digits and nails normal - DUMONT- stiffness right hand/fingers  SKIN: Inspection of skin and subcutaneous tissue baseline, Palpation of skin and subcutaneous tissue baseline  NEURO: Cranial nerves 2-12 are grossly at patient's baseline, Examination of sensation by touch normal   PSYCH: oriented X 3, affect and mood normal             Lab/Diagnostic data:  Recent labs in [facility] reviewed by me today.     ASSESSMENT/PLAN:    Acute kidney injury (H)  - peak Cr 3.38- thought 2/2 dehydration/NSAIDs- resolved with IVF  Stage 3 chronic kidney disease, unspecified whether stage 3a or 3b CKD (H)  - baseline Cr 1.1 - 1.3- in this range at hospital discharge  - recheck BMP this week to ensure stability  - avoid nephrotoxins      Dehydration  - resolved    Acute idiopathic goutRight hand pain  Right hand pain  -colchicine BID, causing diarrhea and not effectively treating gout flare  - discontinue colchicine  - add prednisone taper  - refill oxyCODONE (ROXICODONE) 5 MG tablet; Take 1-2 tablets (5-10 mg) by mouth every 4 hours as needed for moderate to severe pain  - follow clinically  Diarrhea  - likely 2/2 increased colchicine dosing- no associated s/s  - discontinue colchicine  - if persists after colchicine stopped, collect stool and send for c-diff toxin    Chronic low back pain without sciatica, unspecified back pain laterality  DDD (degenerative disc disease), cervical  - chronic- pain team consulted  - continue acetaminophen, Voltaren gel, lidocaine and prn oxycodone and Robaxin  - PT    Calcific tendinitis  -Acute pain of right shoulder  Status post epidural steroid injection   oxyCODONE (ROXICODONE) 5 MG tablet; Take 1-2 tablets (5-10 mg) by mouth every 4 hours as needed for moderate to severe pain  - continue on pain regimen as outlined  - PT/OT  - follow clinically    Essential hypertension  - chronic,  limited data  - continue on labetalol, lisinopril, amlodipine Torsemide  - recheck BMP this week  - VS per unit protocol    Type 2 diabetes, HbA1c goal < 7% (H)  Lab Results   Component Value Date    A1C 7.5 11/01/2021    A1C 8.1 04/29/2021    A1C 7.1 07/27/2020    A1C 7.7 02/19/2020    A1C 6.3 03/25/2019    A1C 6.4 10/17/2018     - on Metformin  - will add TID sliding scale insulin while on prednisone taper  - BGL AC    Benign prostatic hyperplasia without lower urinary tract symptoms  - voiding without issue  - continue on tamsulosin and finasteride    Leukocytosis, unspecified type  No infection noted inpatient. No antibiotics inpatient  - recheck CBC this week to ensure stability  - monitor VS    Coronary artery disease involving native coronary artery of native heart without angina pectoris  - no active s/s.   - continue on beta blocker, ACE, ASA      History of CVA (cerebrovascular accident)  1993-  - continues on ASA, statin    Physical deconditioning  - 2/2 above  - PT/OT to optimize function, safety and independence  - Supportive cares and treatments      Grief reaction  - recent loss of wife, now medical issues and moving from family home. Family is supportive  - open to visiting with ACP  - monitor mood and behaviors  - supportive approach    Orders:  Orders written on the unit.      Total unit floor time 48 minutes- Time consisted of examination of patient, review of patient medical records, labs, imaging and current admission orders/clarification of orders and documentation. 25  minutes spent on counseling and care coordination with patient and nursing regarding clinical status and plan of care.           Electronically signed by:  RANJAN Dougherty CNP                       Sincerely,        RANJAN Dougherty CNP

## 2021-12-14 ENCOUNTER — LAB REQUISITION (OUTPATIENT)
Dept: LAB | Facility: CLINIC | Age: 76
End: 2021-12-14
Payer: COMMERCIAL

## 2021-12-14 DIAGNOSIS — M10.9 GOUT, UNSPECIFIED: ICD-10-CM

## 2021-12-15 LAB
ANION GAP SERPL CALCULATED.3IONS-SCNC: 14 MMOL/L (ref 5–18)
BUN SERPL-MCNC: 35 MG/DL (ref 8–28)
CALCIUM SERPL-MCNC: 9.7 MG/DL (ref 8.5–10.5)
CHLORIDE BLD-SCNC: 104 MMOL/L (ref 98–107)
CO2 SERPL-SCNC: 17 MMOL/L (ref 22–31)
CREAT SERPL-MCNC: 1.46 MG/DL (ref 0.7–1.3)
ERYTHROCYTE [DISTWIDTH] IN BLOOD BY AUTOMATED COUNT: 15.5 % (ref 10–15)
GFR SERPL CREATININE-BSD FRML MDRD: 46 ML/MIN/1.73M2
GLUCOSE BLD-MCNC: 284 MG/DL (ref 70–125)
HCT VFR BLD AUTO: 35.6 % (ref 40–53)
HGB BLD-MCNC: 11.1 G/DL (ref 13.3–17.7)
MCH RBC QN AUTO: 28.3 PG (ref 26.5–33)
MCHC RBC AUTO-ENTMCNC: 31.2 G/DL (ref 31.5–36.5)
MCV RBC AUTO: 91 FL (ref 78–100)
PLATELET # BLD AUTO: 607 10E3/UL (ref 150–450)
POTASSIUM BLD-SCNC: 4.5 MMOL/L (ref 3.5–5)
RBC # BLD AUTO: 3.92 10E6/UL (ref 4.4–5.9)
SODIUM SERPL-SCNC: 135 MMOL/L (ref 136–145)
WBC # BLD AUTO: 9.9 10E3/UL (ref 4–11)

## 2021-12-15 PROCEDURE — P9604 ONE-WAY ALLOW PRORATED TRIP: HCPCS | Mod: ORL | Performed by: INTERNAL MEDICINE

## 2021-12-15 PROCEDURE — 36415 COLL VENOUS BLD VENIPUNCTURE: CPT | Mod: ORL | Performed by: INTERNAL MEDICINE

## 2021-12-15 PROCEDURE — 85027 COMPLETE CBC AUTOMATED: CPT | Mod: ORL | Performed by: INTERNAL MEDICINE

## 2021-12-15 PROCEDURE — 80048 BASIC METABOLIC PNL TOTAL CA: CPT | Mod: ORL | Performed by: INTERNAL MEDICINE

## 2021-12-18 RX ORDER — ALLOPURINOL 100 MG/1
100 TABLET ORAL DAILY
Qty: 30 TABLET | Refills: 3
Start: 2021-12-18 | End: 2022-01-03 | Stop reason: ALTCHOICE

## 2021-12-18 RX ORDER — PREDNISONE 10 MG/1
TABLET ORAL
Start: 2021-12-18 | End: 2022-02-01

## 2021-12-21 ENCOUNTER — TRANSITIONAL CARE UNIT VISIT (OUTPATIENT)
Dept: GERIATRICS | Facility: CLINIC | Age: 76
End: 2021-12-21
Payer: COMMERCIAL

## 2021-12-21 VITALS
WEIGHT: 232 LBS | DIASTOLIC BLOOD PRESSURE: 59 MMHG | SYSTOLIC BLOOD PRESSURE: 131 MMHG | TEMPERATURE: 97 F | HEART RATE: 95 BPM | RESPIRATION RATE: 18 BRPM | OXYGEN SATURATION: 98 % | HEIGHT: 72 IN | BODY MASS INDEX: 31.42 KG/M2

## 2021-12-21 DIAGNOSIS — E86.0 DEHYDRATION: Primary | ICD-10-CM

## 2021-12-21 DIAGNOSIS — N18.30 STAGE 3 CHRONIC KIDNEY DISEASE, UNSPECIFIED WHETHER STAGE 3A OR 3B CKD (H): ICD-10-CM

## 2021-12-21 DIAGNOSIS — M10.041 ACUTE IDIOPATHIC GOUT OF RIGHT HAND: ICD-10-CM

## 2021-12-21 DIAGNOSIS — E11.51 TYPE 2 DIABETES MELLITUS WITH PERIPHERAL VASCULAR DISEASE (H): ICD-10-CM

## 2021-12-21 PROCEDURE — 99316 NF DSCHRG MGMT 30 MIN+: CPT | Performed by: NURSE PRACTITIONER

## 2021-12-21 ASSESSMENT — MIFFLIN-ST. JEOR: SCORE: 1815.35

## 2021-12-21 NOTE — LETTER
"    12/21/2021        RE: Lance Ibrahim  [address]   [place] [address]        [facility]ERIATRIC [facility] DISCHARGE SUMMARY  PATIENT'S NAME: Lance Ibrahim  YOB: 1945  MEDICAL RECORD NUMBER:  [id number]  Place of Service where encounter took place:  [facility] (TCU) [[id number]]    PRIMARY CARE PROVIDER AND CLINIC RESPONSIBLE AFTER TRANSFER:   Anh Spivey NP, [address] [facility] / [place] [address]     Transferring providers: Carol Ann Porras CNP, Tsering Barakat MD  Recent Hospitalization/ED:  [facility]. Hospital stay 12/3/21 through 12/9/21..   HPI:    Per primary TCU provider:  \"76 y.o male admitted to TCU for acute rehab and medical management following hospitalization for back pain for which he was reportedly taking ibuprofen at home.  CT lumbar spine showed degenerative changes most pronounced at L5-S1 with severe right and moderate left neural foraminal narrowing. Also found to have DEAN with Cr of 3.6, baseline  1.1, and leukocytosis, and gout flare to right hand and right shoulder pain. X-ray of right shoulder revealed degenerative changes and evidence of calcific tendinopathy. Patient was started on colchicine and underwent right shoulder steroid injection 12/7 per orthopedics. Renal function improved with IVF and thought to be 2/2 dehydration. Lisinopril and Torsemide were held but resumed upon discharge. NSAIDs have been discouraged. Pain team consulted and pain regimen added. PMH notable for CAD, HTN, HLD, DM type 2, BPH, H/o CVA 1993. Of note patient also recently lost his wife.\"     Today: Long session with patient regarding his current stay and his dissipation of discharging to [facility].  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  He is still experiencing some grief to this and is happy to be staying with his son for the next week prior to going to the [facility].  He is reporting complete resolution of pain symptoms in the wrist and hand " but continues to have some mobility challenges specifically the right middle finger.  Encouraged him to follow-up with his primary as well as therapies.  He is otherwise doing quite well and looking forward to discharge.     Date of SNF Admission: 12/9  Date of SNF (anticipated) Discharge: 12/23  Discharged to: Patient's son's home for 1 week then assisted living facility.  Cognitive Scores: wnl  Physical Function: Ambulating short distances ft with walker  DME: No new DME needed    CODE STATUS/ADVANCE DIRECTIVES DISCUSSION:  Prior   ALLERGIES: Penicillins, Sulfa drugs, and Ancef [cefazolin]    NURSING FACILITY COURSE   Medication Changes/Rationale:     Patient placed on prednisone taper due to acute gout flare.    Summary of nursing facility stay: \    Stage 3 chronic kidney disease, unspecified whether stage 3a or 3b CKD (H)  - baseline Cr 1.1 - 1.3- in this range at hospital discharge  - avoid nephrotoxins    Dehydration  - resolved     Acute idiopathic goutRight hand pain  Right hand pain  -Completed prednisone taper  - oxyCODONE (ROXICODONE) 5 MG tablet; Take 1-2 tablets (5-10 mg) by mouth every 4 hours as needed for moderate to severe pain  - follow clinically    Diarrhea  -Resolved.     Chronic low back pain without sciatica, unspecified back pain laterality  DDD (degenerative disc disease), cervical  - chronic- pain team consulted  - continue acetaminophen, Voltaren gel, lidocaine and prn oxycodone and Robaxin  - PT     Calcific tendinitis  -Acute pain of right shoulder  Status post epidural steroid injection   oxyCODONE (ROXICODONE) 5 MG tablet; Take 1-2 tablets (5-10 mg) by mouth every 4 hours as needed for moderate to severe pain  - continue on pain regimen as outlined  - PT/OT  - follow clinically     Essential hypertension  - chronic, limited data  - continue on labetalol, lisinopril, amlodipine Torsemide  - recheck BMP this week  - VS per unit protocol     Type 2 diabetes, HbA1c goal < 7% (H)  - on  Metformin    Benign prostatic hyperplasia without lower urinary tract symptoms  - voiding without issue  - continue on tamsulosin and finasteride     Leukocytosis, unspecified type  No infection noted inpatient. No antibiotics inpatient  - recheck CBC this week to ensure stability  - monitor VS     Coronary artery disease involving native coronary artery of native heart without angina pectoris  - no active s/s.   - continue on beta blocker, ACE, ASA     History of CVA (cerebrovascular accident)  1993-  - continues on ASA, statin     Physical deconditioning  - 2/2 above  - PT/OT to optimize function, safety and independence  - Supportive cares and treatments     Grief reaction  - recent loss of wife, now medical issues and moving from family home. Family is supportive  - open to visiting with ACP  - monitor mood and behaviors  - supportive approach     Discharge Medications:  Current Outpatient Medications   Medication Sig Dispense Refill     acetaminophen (TYLENOL) 500 MG tablet Take 2 tablets (1,000 mg) by mouth every 8 hours as needed for mild pain       allopurinol (ZYLOPRIM) 100 MG tablet Take 1 tablet (100 mg) by mouth daily 30 tablet 3     aspirin (ASA) 81 MG EC tablet Take 1 tablet (81 mg) by mouth daily 100 tablet 3     atorvastatin (LIPITOR) 40 MG tablet Take 1 tablet (40 mg) by mouth daily 90 tablet 3     bisacodyl (DULCOLAX) 10 MG suppository Place 1 suppository (10 mg) rectally daily as needed for constipation       diclofenac (VOLTAREN) 1 % topical gel Apply 4 g topically 4 times daily       Ferrous Gluconate 240 (27 Fe) MG TABS Take 1 tablet by mouth daily        finasteride (PROSCAR) 5 MG tablet Take 5 mg by mouth daily.       fluticasone (FLONASE) 50 MCG/ACT spray Spray 1 spray into both nostrils At Bedtime        insulin aspart (NOVOLOG PEN) 100 UNIT/ML pen if 70 - 149 = 0 No insulin; 150 - 199 = 2 Units Give 2 Units; 200 - 249 = 4 Units Give 4 Units; 250 - 299 = 6 Units Give 6 Units; 300 - 349 = 8  Units Give 8 Units; 350 - 399 = 10 Units Give 10 Units; 400+ = Call Call provider for BS greater than 400., subcutaneously before meals related to GOUT, UNSPECIFIED (M10.9) until 12/26/2021 00:00 Use sliding scale while on prednisone 15 mL      isosorbide mononitrate (IMDUR) 30 MG 24 hr tablet Take 1 tablet (30 mg) by mouth daily 90 tablet 3     labetalol (NORMODYNE) 200 MG tablet TAKE 1 TABLET BY MOUTH TWICE A  tablet 3     Lidocaine (LIDOCARE) 4 % Patch Place 1 patch onto the skin every 24 hours To prevent lidocaine toxicity, patient should be patch free for 12 hrs daily.       lisinopril (ZESTRIL) 10 MG tablet TAKE 1 TABLET BY MOUTH EVERY DAY 90 tablet 1     loratadine (CLARITIN) 10 MG tablet Take 10 mg by mouth At Bedtime        metFORMIN (GLUCOPHAGE) 1000 MG tablet TAKE 1 TABLET BY MOUTH TWICE A DAY WITH MEALS 180 tablet 1     methocarbamol (ROBAXIN) 500 MG tablet Take 1 tablet (500 mg) by mouth 4 times daily as needed for muscle spasms       Multiple Vitamins-Minerals (MULTIVITAMIN ADULTS PO) Take 1 tablet by mouth daily        ONETOUCH ULTRA test strip TEST DAILY OR AS DIRECTED 100 strip 3     oxyCODONE (ROXICODONE) 5 MG tablet Take 1-2 tablets (5-10 mg) by mouth every 4 hours as needed for moderate to severe pain 60 tablet 0     polyethylene glycol (MIRALAX) 17 GM/Dose powder Take 17 g by mouth daily 510 g      predniSONE (DELTASONE) 10 MG tablet 40 mg daily x 4 days, 20 mg daily x 4 days, 10 mg daily x 4 days then stop 12/26       senna-docusate (SENOKOT-S/PERICOLACE) 8.6-50 MG tablet Take 1 tablet by mouth 2 times daily as needed for constipation       silver sulfADIAZINE (SILVADENE) 1 % external cream Apply topically daily To bilateral foot wounds       tamsulosin (FLOMAX) 0.4 MG 24 hr capsule Take 1 capsule by mouth daily.       torsemide (DEMADEX) 20 MG tablet TAKE 1 TABLET BY MOUTH EVERY DAY 90 tablet 2          Controlled medications:   Oxycodone, okay to send with remaining supply     Past  Medical History:   Past Medical History:   Diagnosis Date     Arthritis     osteoarthritis knees     BPH      CAD (coronary artery disease)     subtotal occlusion in the small distal LAD      Cardiomyopathy      Cerebral artery occlusion with cerebral infarction     TIA 1993, no residual     Chronic kidney disease      Chronic rhinitis      HTN (hypertension)      Hyperlipidemia      Kidney stone      PVD (peripheral vascular disease)      Sleep apnea     doesn't use cpap     TIA (transient ischaemic attack) 1993     Type 2 diabetes mellitus - 11/08/2018     Ureteral stone      Physical Exam:   Vitals: /59   Pulse 95   Temp 97  F (36.1  C)   Resp 18   Ht 1.829 m (6')   Wt 105.2 kg (232 lb)   SpO2 98%   BMI 31.46 kg/m    BMI= Body mass index is 31.46 kg/m .     Physical Exam   General appearance: alert, appears stated age and cooperative.   Head: Normocephalic, without obvious abnormality, atraumatic, Eyes: sclera anicteric.  Lungs: respirations without effort, LSCTA; no wheezing or rales.   Cardiovascular: S1, S2. Regular rate and rhythm.   ABDOMEN: Globular and soft, non tender.    Extremities: extremities normal, atraumatic, no cyanosis or edema  Skin: Skin color, texture, turgor normal. No rashes or lesions  Neurologic:oriented. No focal deficits.   Psych: interacts well with caregivers, exhibits logical thought processes and connections, pleasant      SNF labs:   Recent Results (from the past 240 hour(s))   CBC (+ platelets, no diff)    Collection Time: 12/25/21  8:00 PM   Result Value Ref Range    WBC Count 18.6 (H) 4.0 - 11.0 10e3/uL    RBC Count 4.41 4.40 - 5.90 10e6/uL    Hemoglobin 12.7 (L) 13.3 - 17.7 g/dL    Hematocrit 40.0 40.0 - 53.0 %    MCV 91 78 - 100 fL    MCH 28.8 26.5 - 33.0 pg    MCHC 31.8 31.5 - 36.5 g/dL    RDW 16.0 (H) 10.0 - 15.0 %    Platelet Count 379 150 - 450 10e3/uL   Basic metabolic panel    Collection Time: 12/25/21  8:00 PM   Result Value Ref Range    Sodium 136 136 - 145  mmol/L    Potassium 4.6 3.5 - 5.0 mmol/L    Chloride 103 98 - 107 mmol/L    Carbon Dioxide (CO2) 18 (L) 22 - 31 mmol/L    Anion Gap 15 5 - 18 mmol/L    Urea Nitrogen 23 8 - 28 mg/dL    Creatinine 1.43 (H) 0.70 - 1.30 mg/dL    Calcium 10.1 8.5 - 10.5 mg/dL    Glucose 187 (H) 70 - 125 mg/dL    GFR Estimate 50 (L) >60 mL/min/1.73m2   INR    Collection Time: 12/25/21  8:00 PM   Result Value Ref Range    INR 1.17 (H) 0.85 - 1.15   Lactic acid whole blood    Collection Time: 12/25/21  8:13 PM   Result Value Ref Range    Lactic Acid 1.3 0.7 - 2.0 mmol/L   Asymptomatic COVID-19 Virus (Coronavirus) by PCR Nasopharyngeal    Collection Time: 12/25/21  8:48 PM    Specimen: Nasopharyngeal; Swab   Result Value Ref Range    SARS CoV2 PCR Negative Negative       DISCHARGE PLAN:    Follow up labs: Per PCP.    Medical Follow Up:      Follow up with primary care provider in 1 weeks    [facility] scheduled appointments:  None.    Discharge Services: None, will receive at [facility].    Discharge Instructions Verbalized to Patient at Discharge:     None    TOTAL DISCHARGE TIME:   Greater than 30 minutes  Electronically signed by:  Carol Ann Porras CNP                     Sincerely,        Carol Ann Porras CNP

## 2021-12-25 ENCOUNTER — APPOINTMENT (OUTPATIENT)
Dept: CT IMAGING | Facility: CLINIC | Age: 76
End: 2021-12-25
Attending: EMERGENCY MEDICINE
Payer: COMMERCIAL

## 2021-12-25 ENCOUNTER — APPOINTMENT (OUTPATIENT)
Dept: RADIOLOGY | Facility: CLINIC | Age: 76
End: 2021-12-25
Attending: EMERGENCY MEDICINE
Payer: COMMERCIAL

## 2021-12-25 ENCOUNTER — HOSPITAL ENCOUNTER (EMERGENCY)
Facility: CLINIC | Age: 76
Discharge: HOME | End: 2021-12-25
Attending: EMERGENCY MEDICINE | Admitting: EMERGENCY MEDICINE
Payer: COMMERCIAL

## 2021-12-25 VITALS
SYSTOLIC BLOOD PRESSURE: 127 MMHG | RESPIRATION RATE: 20 BRPM | TEMPERATURE: 98.6 F | BODY MASS INDEX: 31.19 KG/M2 | OXYGEN SATURATION: 95 % | DIASTOLIC BLOOD PRESSURE: 67 MMHG | WEIGHT: 230 LBS | HEART RATE: 122 BPM

## 2021-12-25 DIAGNOSIS — S01.01XA LACERATION OF SCALP, INITIAL ENCOUNTER: ICD-10-CM

## 2021-12-25 DIAGNOSIS — M25.511 ACUTE PAIN OF RIGHT SHOULDER: ICD-10-CM
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
## 2021-12-25 LAB
ANION GAP SERPL CALCULATED.3IONS-SCNC: 15 MMOL/L (ref 5–18)
BUN SERPL-MCNC: 23 MG/DL (ref 8–28)
CALCIUM SERPL-MCNC: 10.1 MG/DL (ref 8.5–10.5)
CHLORIDE BLD-SCNC: 103 MMOL/L (ref 98–107)
CO2 SERPL-SCNC: 18 MMOL/L (ref 22–31)
CREAT SERPL-MCNC: 1.43 MG/DL (ref 0.7–1.3)
ERYTHROCYTE [DISTWIDTH] IN BLOOD BY AUTOMATED COUNT: 16 % (ref 10–15)
GFR SERPL CREATININE-BSD FRML MDRD: 50 ML/MIN/1.73M2
GLUCOSE BLD-MCNC: 187 MG/DL (ref 70–125)
HCT VFR BLD AUTO: 40 % (ref 40–53)
HGB BLD-MCNC: 12.7 G/DL (ref 13.3–17.7)
INR PPP: 1.17 (ref 0.85–1.15)
LACTATE SERPL-SCNC: 1.3 MMOL/L (ref 0.7–2)
MCH RBC QN AUTO: 28.8 PG (ref 26.5–33)
MCHC RBC AUTO-ENTMCNC: 31.8 G/DL (ref 31.5–36.5)
MCV RBC AUTO: 91 FL (ref 78–100)
PLATELET # BLD AUTO: 379 10E3/UL (ref 150–450)
POTASSIUM BLD-SCNC: 4.6 MMOL/L (ref 3.5–5)
RBC # BLD AUTO: 4.41 10E6/UL (ref 4.4–5.9)
SARS-COV-2 RNA RESP QL NAA+PROBE: NEGATIVE
SODIUM SERPL-SCNC: 136 MMOL/L (ref 136–145)
WBC # BLD AUTO: 18.6 10E3/UL (ref 4–11)

## 2021-12-25 PROCEDURE — 83605 ASSAY OF LACTIC ACID: CPT | Performed by: EMERGENCY MEDICINE

## 2021-12-25 PROCEDURE — 12001 RPR S/N/AX/GEN/TRNK 2.5CM/<: CPT

## 2021-12-25 PROCEDURE — 73000 X-RAY EXAM OF COLLAR BONE: CPT | Mod: RT

## 2021-12-25 PROCEDURE — 99285 EMERGENCY DEPT VISIT HI MDM: CPT | Mod: 25

## 2021-12-25 PROCEDURE — C9803 HOPD COVID-19 SPEC COLLECT: HCPCS

## 2021-12-25 PROCEDURE — 36415 COLL VENOUS BLD VENIPUNCTURE: CPT | Performed by: EMERGENCY MEDICINE

## 2021-12-25 PROCEDURE — 72125 CT NECK SPINE W/O DYE: CPT

## 2021-12-25 PROCEDURE — 70450 CT HEAD/BRAIN W/O DYE: CPT

## 2021-12-25 PROCEDURE — 250N000011 HC RX IP 250 OP 636: Performed by: EMERGENCY MEDICINE

## 2021-12-25 PROCEDURE — 73030 X-RAY EXAM OF SHOULDER: CPT | Mod: RT

## 2021-12-25 PROCEDURE — 85027 COMPLETE CBC AUTOMATED: CPT | Performed by: EMERGENCY MEDICINE

## 2021-12-25 PROCEDURE — 96374 THER/PROPH/DIAG INJ IV PUSH: CPT

## 2021-12-25 PROCEDURE — 87635 SARS-COV-2 COVID-19 AMP PRB: CPT | Performed by: EMERGENCY MEDICINE

## 2021-12-25 PROCEDURE — 85610 PROTHROMBIN TIME: CPT | Performed by: EMERGENCY MEDICINE

## 2021-12-25 PROCEDURE — 80048 BASIC METABOLIC PNL TOTAL CA: CPT | Performed by: EMERGENCY MEDICINE

## 2021-12-25 RX ORDER — HYDROMORPHONE HYDROCHLORIDE 1 MG/ML
0.5 INJECTION, SOLUTION INTRAMUSCULAR; INTRAVENOUS; SUBCUTANEOUS ONCE
Status: COMPLETED | OUTPATIENT
Start: 2021-12-25 | End: 2021-12-25

## 2021-12-25 RX ADMIN — HYDROMORPHONE HYDROCHLORIDE 0.5 MG: 1 INJECTION, SOLUTION INTRAMUSCULAR; INTRAVENOUS; SUBCUTANEOUS at 20:09

## 2021-12-25 ASSESSMENT — ENCOUNTER SYMPTOMS
NAUSEA: 0
CHILLS: 0
DIZZINESS: 0
LIGHT-HEADEDNESS: 0
ABDOMINAL PAIN: 0
NUMBNESS: 0
FEVER: 0
HEADACHES: 1
SORE THROAT: 0
COUGH: 0
SHORTNESS OF BREATH: 0
NECK PAIN: 1
VOMITING: 0
DIARRHEA: 0

## 2021-12-26 NOTE — ED PROVIDER NOTES
EMERGENCY DEPARTMENT ENCOUNTER      NAME: Lance Ibrahim  AGE: 76 year old male  YOB: 1945  MRN: [id number]  EVALUATION DATE & TIME: 12/25/2021  7:38 PM    PCP: Anh Spivey    ED PROVIDER: Darwin Gaytan M.D.      Chief Complaint   Patient presents with     Fall     Head Injury     Neck Pain     Shoulder Pain         FINAL IMPRESSION:  1. Fall, initial encounter    2. Acute pain of right shoulder    3. Laceration of scalp, initial encounter          ED COURSE & MEDICAL DECISION MAKING:    Pertinent Labs & Imaging studies reviewed. (See chart for details)  ED Course as of 12/25/21 2221   Sat Dec 25, 2021   2008 Patient is a 76-year-old gentleman who presents in pain after fall in the shower.  It was a mechanical fall, but in the back of his head quite hard had a laceration, and severe pain to the right side of his neck and shoulder.  Unable to get up on his own at home.  No LOC.  Not on blood thinners.  He is alert and oriented here, GCS 15, wound to the back of the head is hemostatic, covered and matted hair and clot.  C-spine is nontender midline, however he does have quite a bit of pain in the right side of the neck and shoulder.  We will get a CT scan of the head and neck and x-rays of the shoulder and collarbone.  IV analgesia ordered.   2012 WBC(!): 18.6  Could be due to trauma - no infectious prodrome prior to fall   2023 Creatinine(!): 1.43  Baseline CKD   2051 Clavicle XR, right   No fracture or dislocation. No degenerative changes. Normal AC joint. Small soft tissue calcification along the dorsal/lateral portion of the humeral head. Normal right clavicle.    2121 Head CT w/o contrast  HEAD CT:  1. Left parietal scalp contusion. No acute intracranial hemorrhage or calvarial fracture.     2. Mild age-related change.      CERVICAL SPINE CT:  1.  No CT evidence for acute fracture or post traumatic subluxation.   2220 Patient son is here.  We had a long discussion about his pain,  "ambulation at home.  He uses a cane, and is currently staying with his son for the next week as a bridge until he gets into assisted living.  His son is comfortable taking him home as long as he is able to walk comfortably.  We will make sure he passes an ambulation test here.  I placed a single staple to left parietal scalp where there was a well approximated laceration.  He looks much more comfortable after the single dose of IV Dilaudid here.  He has oxycodone, muscle relaxers, prednisone he is already taking for gout flare at home.  That should be more than sufficient.         Additional ED Course Timestamps:  7:44 PM I met with the patient, obtained history, performed an initial exam, and discussed options and plan for diagnostics and treatment here in the ED.    At the conclusion of the encounter I discussed the results of all of the tests and the disposition. The questions were answered. The patient or family acknowledged understanding and was agreeable with the care plan.     MEDICATIONS GIVEN IN THE EMERGENCY:  Medications   HYDROmorphone (PF) (DILAUDID) injection 0.5 mg (0.5 mg Intravenous Given 12/25/21 2009)         NEW PRESCRIPTIONS STARTED AT TODAY'S ER VISIT  New Prescriptions    No medications on file          =================================================================    HPI    Patient information was obtained from: patient and EMS    Use of : N/A       Lance Ibrahim is a 76 year old male with a pertinent history of HTN, HLD, DM2, CAD, cardiomyopathy, arthritis, TIA who presents to this ED via EMS for evaluation of fall, head injury, neck pain and shoulder pain.     This evening, patient was in the shower, when he was leaning back to rinse his head, he \"leaned too far\" and lost control. Patient had a mechanical fall, hitting the back of his head. Denies LOC. He was not able to get up without assistance from the shower floor. EMS was called. Here in the ED, patient presents with " laceration to back of scalp with bleeding controlled. He c/o right shoulder pain, neck pain, and right side of clavicle pain and headache. Denies any hip pain or leg pain. Patient takes 81 mg aspirin daily.        REVIEW OF SYSTEMS   Review of Systems   Constitutional: Negative for chills and fever.   HENT: Negative for congestion and sore throat.    Respiratory: Negative for cough and shortness of breath.    Cardiovascular: Negative for chest pain.   Gastrointestinal: Negative for abdominal pain, diarrhea, nausea and vomiting.   Musculoskeletal: Positive for neck pain.        Positive for righ shoulder pain and and right side of clavicle pain.   Skin: Negative for rash.   Neurological: Positive for headaches. Negative for dizziness, light-headedness and numbness.   All other systems reviewed and are negative.       PAST MEDICAL HISTORY:  Past Medical History:   Diagnosis Date     Arthritis     osteoarthritis knees     BPH      CAD (coronary artery disease)     subtotal occlusion in the small distal LAD      Cardiomyopathy      Cerebral artery occlusion with cerebral infarction     TIA 1993, no residual     Chronic kidney disease      Chronic rhinitis      HTN (hypertension)      Hyperlipidemia      Kidney stone      PVD (peripheral vascular disease)      Sleep apnea     doesn't use cpap     TIA (transient ischaemic attack) 1993     Type 2 diabetes mellitus - 11/08/2018     Ureteral stone        PAST SURGICAL HISTORY:  Past Surgical History:   Procedure Laterality Date     AMPUTATE TOE(S) Left 10/15/2018    Procedure: AMPUTATE TOE(S);  Left third toe amputation;  Surgeon: Ayaka Azevedo DPM, Podiatry/Foot and Ankle Surgery;  Location:  OR     ARTHROPLASTY KNEE Right 12/07/2018    Procedure: Right total knee arthroplasty;  Surgeon: Issa Cunha MD;  Location:  OR     COLONOSCOPY  03/09/2013    Procedure: COLONOSCOPY;  COLONOSCOPY;  Surgeon: Chau Hogan MD;  Location:  GI     CV HEART  "CATHETERIZATION WITH POSSIBLE INTERVENTION Left 03/05/2019    Procedure: Coronary Angiogram;  Surgeon: Nas Linda MD;  Location: [facility]     Diastasis recti repair  1985     EXTRACAPSULAR CATARACT EXTRATION WITH INTRAOCULAR LENS IMPLANT Left 03/13/2017     EXTRACAPSULAR CATARACT EXTRATION WITH INTRAOCULAR LENS IMPLANT Right      FOOT SURGERY  04/2013    cyst removal      HERNIA REPAIR  07/13/2004    ventral      ORIF Shoulder Left      Ventral hernia repair NOS  1987           CURRENT MEDICATIONS:    No current facility-administered medications for this encounter.     Current Outpatient Medications   Medication     acetaminophen (TYLENOL) 500 MG tablet     allopurinol (ZYLOPRIM) 100 MG tablet     aspirin (ASA) 81 MG EC tablet     atorvastatin (LIPITOR) 40 MG tablet     bisacodyl (DULCOLAX) 10 MG suppository     diclofenac (VOLTAREN) 1 % topical gel     Ferrous Gluconate 240 (27 Fe) MG TABS     finasteride (PROSCAR) 5 MG tablet     fluticasone (FLONASE) 50 MCG/ACT spray     insulin aspart (NOVOLOG PEN) 100 UNIT/ML pen     isosorbide mononitrate (IMDUR) 30 MG 24 hr tablet     labetalol (NORMODYNE) 200 MG tablet     Lidocaine (LIDOCARE) 4 % Patch     lisinopril (ZESTRIL) 10 MG tablet     loratadine (CLARITIN) 10 MG tablet     metFORMIN (GLUCOPHAGE) 1000 MG tablet     methocarbamol (ROBAXIN) 500 MG tablet     Multiple Vitamins-Minerals (MULTIVITAMIN ADULTS PO)     ONETOUCH ULTRA test strip     oxyCODONE (ROXICODONE) 5 MG tablet     polyethylene glycol (MIRALAX) 17 GM/Dose powder     predniSONE (DELTASONE) 10 MG tablet     senna-docusate (SENOKOT-S/PERICOLACE) 8.6-50 MG tablet     silver sulfADIAZINE (SILVADENE) 1 % external cream     tamsulosin (FLOMAX) 0.4 MG 24 hr capsule     torsemide (DEMADEX) 20 MG tablet       ALLERGIES:  Allergies   Allergen Reactions     Penicillins Anaphylaxis     \"anaphylactic\"     Sulfa Drugs      \"itchy rash, swelling of face and hands\"     Ancef [Cefazolin] Rash "       FAMILY HISTORY:  Family History   Problem Relation Age of Onset     Family History Negative Mother          86 yo     Cancer Father          74 yo brain     Diabetes Maternal Grandfather          89 yo     Alcohol/Drug Paternal Grandfather              Colon Cancer No family hx of        SOCIAL HISTORY:   Social History     Socioeconomic History     Marital status:      Spouse name: Not on file     Number of children: Not on file     Years of education: Not on file     Highest education level: Not on file   Occupational History     Employer: SELF   Tobacco Use     Smoking status: Former Smoker     Packs/day: 0.00     Quit date: 3/17/1993     Years since quittin.7     Smokeless tobacco: Never Used   Substance and Sexual Activity     Alcohol use: Not Currently     Drug use: No     Sexual activity: Never   Other Topics Concern     Parent/sibling w/ CABG, MI or angioplasty before 65F 55M? Not Asked   Social History Narrative     Not on file     Social Determinants of Health     Financial Resource Strain: Not on file   Food Insecurity: Not on file   Transportation Needs: Not on file   Physical Activity: Not on file   Stress: Not on file   Social Connections: Not on file   Intimate Partner Violence: Not on file   Housing Stability: Not on file       VITALS:  BP (!) 155/75   Pulse 120   Temp 98.6  F (37  C) (Oral)   Resp 20   Wt 104.3 kg (230 lb)   SpO2 95%   BMI 31.19 kg/m      PHYSICAL EXAM    Constitutional: Well developed, well nourished. uncomfortable appearing. Shivering.  HENT: Dried blood over face, large clot through hair, without any active dripping blood, 1.5 cm laceration to left parietal scalp. Mucous membranes moist, nose midline, bilateral nares patent. No pain along mandible, no trismus.  Eyes: PERRL, mid-range pupils, conjunctiva normal.  Neck: Tenderness to right trapezius muscle. Tenderness to lightest of touch of right shoulder and clavicle area.  No midline cervical spine tenderness. No midline pain with active flexion, extension, or bilateral rotation.  Respiratory: CTAB. Normal work of breathing. No abrasions, contusion or tenderness to chest wall.  Cardiovascular: Regular rate and rhythm.  Musculoskeletal: No pain of left upper extremity and lower extremities, including bilateral hips, knees, ankles and feet. No pain with palpation along joints and long bones. No abrasions. No pain or laxity with A/P and lateral compression of the pelvis.  Abdomen: Non-tender in all quadrants. No guarding. No seatbelt sign.  Back: No midline C,T,L tenderness or deformity. No abrasions or bony stepoffs.  Neurologic: Alert & oriented x 3, cranial nerves grossly intact. Normal gross coordination  Psychiatric: Affect normal, cooperative.     LAB:  All pertinent labs reviewed and interpreted.  Labs Ordered and Resulted from Time of ED Arrival to Time of ED Departure   CBC WITH PLATELETS - Abnormal       Result Value    WBC Count 18.6 (*)     RBC Count 4.41      Hemoglobin 12.7 (*)     Hematocrit 40.0      MCV 91      MCH 28.8      MCHC 31.8      RDW 16.0 (*)     Platelet Count 379     BASIC METABOLIC PANEL - Abnormal    Sodium 136      Potassium 4.6      Chloride 103      Carbon Dioxide (CO2) 18 (*)     Anion Gap 15      Urea Nitrogen 23      Creatinine 1.43 (*)     Calcium 10.1      Glucose 187 (*)     GFR Estimate 50 (*)    INR - Abnormal    INR 1.17 (*)    LACTIC ACID WHOLE BLOOD - Normal    Lactic Acid 1.3     COVID-19 VIRUS (CORONAVIRUS) BY PCR - Normal    SARS CoV2 PCR Negative         RADIOLOGY:  Reviewed all pertinent imaging. Please see official radiology report.  Clavicle XR, right   Final Result   IMPRESSION: No fracture or dislocation. No degenerative changes. Normal AC joint. Small soft tissue calcification along the dorsal/lateral portion of the humeral head. Normal right clavicle.       XR Shoulder Right 2 Views   Final Result   IMPRESSION: No fracture or  dislocation. No degenerative changes. Normal AC joint. Small soft tissue calcification along the dorsal/lateral portion of the humeral head. Normal right clavicle.       Cervical spine CT w/o contrast   Final Result   IMPRESSION:   HEAD CT:   1. Left parietal scalp contusion. No acute intracranial hemorrhage or calvarial fracture.      2. Mild age-related change.       CERVICAL SPINE CT:   1.  No CT evidence for acute fracture or post traumatic subluxation.      Head CT w/o contrast   Final Result   IMPRESSION:   HEAD CT:   1. Left parietal scalp contusion. No acute intracranial hemorrhage or calvarial fracture.      2. Mild age-related change.       CERVICAL SPINE CT:   1.  No CT evidence for acute fracture or post traumatic subluxation.          EKG:    All EKG interpretations will be found in ED course above.    PROCEDURES:   PROCEDURE: Laceration Repair   INDICATIONS: Laceration   PROCEDURE PROVIDER: Dr Leighton Gaytan   SITE: Left parietal scalp   TYPE/SIZE: simple, clean and no foreign body visualized  1.5 cm (total length)   FUNCTIONAL ASSESSMENT: Distal sensation, circulation and motor intact   MEDICATION: none   PREPARATION: scrubbing and irrigation with Normal saline and Warm soapy water   DEBRIDEMENT: wound explored, no foreign body found   CLOSURE:  Wound was closed with one staple     I, Sara Behmanesh  am serving as a scribe to document services personally performed by Dr. Darwin Gaytan based on my observation and the provider's statements to me. I, Darwin Gaytan MD attest that Sara Behmanesh is acting in a scribe capacity, has observed my performance of the services and has documented them in accordance with my direction.      Darwin Gaytan M.D.  Emergency Medicine  [facility] EMERGENCY ROOM  [address] [facility] [address]  [phone]  Dept: [phone]       Darwin Gaytan MD  12/25/21 2223       Lukas  MD Darwin  12/25/21 6246

## 2021-12-26 NOTE — DISCHARGE INSTRUCTIONS
The CT scan of your head and neck shows that there is been no damage to the brain or skull or spine, but there was a laceration that is closed with a single staple.  The staple need to come out in about 7 days.  It can be done in clinic.  Please keep your head dry for the next 3 days, then it is okay to let warm soapy water run over the wound, but do not scrub or irrigate the wound directly with shower sprayer.    Please continue taking your medications at home for inflammation, the prednisone, the muscle relaxer, the oxycodone.  If is also important to know that these medications can make you feel unsteady at times, lightheaded or weak.      
cane

## 2021-12-26 NOTE — ED TRIAGE NOTES
BIBA from after fall while in shower. Patient slipped and hit back of head, denies LOC, takes 81mg aspirin daily. Laceration to back of scalp, bleeding controlled. Patient now having R shoulder and neck pain. MD at bedside and does not want a c collar applied at this time.

## 2021-12-27 ENCOUNTER — TRANSFERRED RECORDS (OUTPATIENT)
Dept: HEALTH INFORMATION MANAGEMENT | Facility: CLINIC | Age: 76
End: 2021-12-27
Payer: MEDICARE

## 2021-12-27 PROBLEM — M10.041 ACUTE IDIOPATHIC GOUT OF RIGHT HAND: Status: ACTIVE | Noted: 2021-12-27

## 2021-12-27 LAB — PHQ9 SCORE: 5

## 2021-12-27 NOTE — PROGRESS NOTES
"[facility] GERIATRIC SERVICES DISCHARGE SUMMARY  PATIENT'S NAME: Lance Ibrahim  YOB: 1945  MEDICAL RECORD NUMBER:  [id number]  Place of Service where encounter took place:  [facility] (TCU) [[id number]]    PRIMARY CARE PROVIDER AND CLINIC RESPONSIBLE AFTER TRANSFER:   Anh Spivey NP, [address] [facility] / [facility] [address]     Transferring providers: Carol Ann Porras CNP, Tsering Barakat MD  Recent Hospitalization/ED:  [facility]. Hospital stay 12/3/21 through 12/9/21..   HPI:    Per primary TCU provider:  \"76 y.o male admitted to TCU for acute rehab and medical management following hospitalization for back pain for which he was reportedly taking ibuprofen at home.  CT lumbar spine showed degenerative changes most pronounced at L5-S1 with severe right and moderate left neural foraminal narrowing. Also found to have DEAN with Cr of 3.6, baseline  1.1, and leukocytosis, and gout flare to right hand and right shoulder pain. X-ray of right shoulder revealed degenerative changes and evidence of calcific tendinopathy. Patient was started on colchicine and underwent right shoulder steroid injection 12/7 per orthopedics. Renal function improved with IVF and thought to be 2/2 dehydration. Lisinopril and Torsemide were held but resumed upon discharge. NSAIDs have been discouraged. Pain team consulted and pain regimen added. PMH notable for CAD, HTN, HLD, DM type 2, BPH, H/o CVA 1993. Of note patient also recently lost his wife.\"     Today: Long session with patient regarding his current stay and his dissipation of discharging to [facility].  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  He is still experiencing some grief to this and is happy to be staying with his son for the next week prior to going to the [facility].  He is reporting complete resolution of pain symptoms in the wrist and hand but continues to have some mobility challenges specifically the right middle finger.  " Encouraged him to follow-up with his primary as well as therapies.  He is otherwise doing quite well and looking forward to discharge.     Date of SNF Admission: 12/9  Date of SNF (anticipated) Discharge: 12/23  Discharged to: Patient's son's home for 1 week then assisted living facility.  Cognitive Scores: wnl  Physical Function: Ambulating short distances ft with walker  DME: No new DME needed    CODE STATUS/ADVANCE DIRECTIVES DISCUSSION:  Prior   ALLERGIES: Penicillins, Sulfa drugs, and Ancef [cefazolin]    NURSING FACILITY COURSE   Medication Changes/Rationale:     Patient placed on prednisone taper due to acute gout flare.    Summary of nursing facility stay: \    Stage 3 chronic kidney disease, unspecified whether stage 3a or 3b CKD (H)  - baseline Cr 1.1 - 1.3- in this range at hospital discharge  - avoid nephrotoxins    Dehydration  - resolved     Acute idiopathic goutRight hand pain  Right hand pain  -Completed prednisone taper  - oxyCODONE (ROXICODONE) 5 MG tablet; Take 1-2 tablets (5-10 mg) by mouth every 4 hours as needed for moderate to severe pain  - follow clinically    Diarrhea  -Resolved.     Chronic low back pain without sciatica, unspecified back pain laterality  DDD (degenerative disc disease), cervical  - chronic- pain team consulted  - continue acetaminophen, Voltaren gel, lidocaine and prn oxycodone and Robaxin  - PT     Calcific tendinitis  -Acute pain of right shoulder  Status post epidural steroid injection   oxyCODONE (ROXICODONE) 5 MG tablet; Take 1-2 tablets (5-10 mg) by mouth every 4 hours as needed for moderate to severe pain  - continue on pain regimen as outlined  - PT/OT  - follow clinically     Essential hypertension  - chronic, limited data  - continue on labetalol, lisinopril, amlodipine Torsemide  - recheck BMP this week  - VS per unit protocol     Type 2 diabetes, HbA1c goal < 7% (H)  - on Metformin    Benign prostatic hyperplasia without lower urinary tract symptoms  - voiding  without issue  - continue on tamsulosin and finasteride     Leukocytosis, unspecified type  No infection noted inpatient. No antibiotics inpatient  - recheck CBC this week to ensure stability  - monitor VS     Coronary artery disease involving native coronary artery of native heart without angina pectoris  - no active s/s.   - continue on beta blocker, ACE, ASA     History of CVA (cerebrovascular accident)  1993-  - continues on ASA, statin     Physical deconditioning  - 2/2 above  - PT/OT to optimize function, safety and independence  - Supportive cares and treatments     Grief reaction  - recent loss of wife, now medical issues and moving from family home. Family is supportive  - open to visiting with ACP  - monitor mood and behaviors  - supportive approach     Discharge Medications:  Current Outpatient Medications   Medication Sig Dispense Refill     acetaminophen (TYLENOL) 500 MG tablet Take 2 tablets (1,000 mg) by mouth every 8 hours as needed for mild pain       allopurinol (ZYLOPRIM) 100 MG tablet Take 1 tablet (100 mg) by mouth daily 30 tablet 3     aspirin (ASA) 81 MG EC tablet Take 1 tablet (81 mg) by mouth daily 100 tablet 3     atorvastatin (LIPITOR) 40 MG tablet Take 1 tablet (40 mg) by mouth daily 90 tablet 3     bisacodyl (DULCOLAX) 10 MG suppository Place 1 suppository (10 mg) rectally daily as needed for constipation       diclofenac (VOLTAREN) 1 % topical gel Apply 4 g topically 4 times daily       Ferrous Gluconate 240 (27 Fe) MG TABS Take 1 tablet by mouth daily        finasteride (PROSCAR) 5 MG tablet Take 5 mg by mouth daily.       fluticasone (FLONASE) 50 MCG/ACT spray Spray 1 spray into both nostrils At Bedtime        insulin aspart (NOVOLOG PEN) 100 UNIT/ML pen if 70 - 149 = 0 No insulin; 150 - 199 = 2 Units Give 2 Units; 200 - 249 = 4 Units Give 4 Units; 250 - 299 = 6 Units Give 6 Units; 300 - 349 = 8 Units Give 8 Units; 350 - 399 = 10 Units Give 10 Units; 400+ = Call Call provider for BS  greater than 400., subcutaneously before meals related to GOUT, UNSPECIFIED (M10.9) until 12/26/2021 00:00 Use sliding scale while on prednisone 15 mL      isosorbide mononitrate (IMDUR) 30 MG 24 hr tablet Take 1 tablet (30 mg) by mouth daily 90 tablet 3     labetalol (NORMODYNE) 200 MG tablet TAKE 1 TABLET BY MOUTH TWICE A  tablet 3     Lidocaine (LIDOCARE) 4 % Patch Place 1 patch onto the skin every 24 hours To prevent lidocaine toxicity, patient should be patch free for 12 hrs daily.       lisinopril (ZESTRIL) 10 MG tablet TAKE 1 TABLET BY MOUTH EVERY DAY 90 tablet 1     loratadine (CLARITIN) 10 MG tablet Take 10 mg by mouth At Bedtime        metFORMIN (GLUCOPHAGE) 1000 MG tablet TAKE 1 TABLET BY MOUTH TWICE A DAY WITH MEALS 180 tablet 1     methocarbamol (ROBAXIN) 500 MG tablet Take 1 tablet (500 mg) by mouth 4 times daily as needed for muscle spasms       Multiple Vitamins-Minerals (MULTIVITAMIN ADULTS PO) Take 1 tablet by mouth daily        ONETOUCH ULTRA test strip TEST DAILY OR AS DIRECTED 100 strip 3     oxyCODONE (ROXICODONE) 5 MG tablet Take 1-2 tablets (5-10 mg) by mouth every 4 hours as needed for moderate to severe pain 60 tablet 0     polyethylene glycol (MIRALAX) 17 GM/Dose powder Take 17 g by mouth daily 510 g      predniSONE (DELTASONE) 10 MG tablet 40 mg daily x 4 days, 20 mg daily x 4 days, 10 mg daily x 4 days then stop 12/26       senna-docusate (SENOKOT-S/PERICOLACE) 8.6-50 MG tablet Take 1 tablet by mouth 2 times daily as needed for constipation       silver sulfADIAZINE (SILVADENE) 1 % external cream Apply topically daily To bilateral foot wounds       tamsulosin (FLOMAX) 0.4 MG 24 hr capsule Take 1 capsule by mouth daily.       torsemide (DEMADEX) 20 MG tablet TAKE 1 TABLET BY MOUTH EVERY DAY 90 tablet 2          Controlled medications:   Oxycodone, okay to send with remaining supply     Past Medical History:   Past Medical History:   Diagnosis Date     Arthritis     osteoarthritis  knees     BPH      CAD (coronary artery disease)     subtotal occlusion in the small distal LAD      Cardiomyopathy      Cerebral artery occlusion with cerebral infarction     TIA 1993, no residual     Chronic kidney disease      Chronic rhinitis      HTN (hypertension)      Hyperlipidemia      Kidney stone      PVD (peripheral vascular disease)      Sleep apnea     doesn't use cpap     TIA (transient ischaemic attack) 1993     Type 2 diabetes mellitus - 11/08/2018     Ureteral stone      Physical Exam:   Vitals: /59   Pulse 95   Temp 97  F (36.1  C)   Resp 18   Ht 1.829 m (6')   Wt 105.2 kg (232 lb)   SpO2 98%   BMI 31.46 kg/m    BMI= Body mass index is 31.46 kg/m .     Physical Exam   General appearance: alert, appears stated age and cooperative.   Head: Normocephalic, without obvious abnormality, atraumatic, Eyes: sclera anicteric.  Lungs: respirations without effort, LSCTA; no wheezing or rales.   Cardiovascular: S1, S2. Regular rate and rhythm.   ABDOMEN: Globular and soft, non tender.    Extremities: extremities normal, atraumatic, no cyanosis or edema  Skin: Skin color, texture, turgor normal. No rashes or lesions  Neurologic:oriented. No focal deficits.   Psych: interacts well with caregivers, exhibits logical thought processes and connections, pleasant      SNF labs:   Recent Results (from the past 240 hour(s))   CBC (+ platelets, no diff)    Collection Time: 12/25/21  8:00 PM   Result Value Ref Range    WBC Count 18.6 (H) 4.0 - 11.0 10e3/uL    RBC Count 4.41 4.40 - 5.90 10e6/uL    Hemoglobin 12.7 (L) 13.3 - 17.7 g/dL    Hematocrit 40.0 40.0 - 53.0 %    MCV 91 78 - 100 fL    MCH 28.8 26.5 - 33.0 pg    MCHC 31.8 31.5 - 36.5 g/dL    RDW 16.0 (H) 10.0 - 15.0 %    Platelet Count 379 150 - 450 10e3/uL   Basic metabolic panel    Collection Time: 12/25/21  8:00 PM   Result Value Ref Range    Sodium 136 136 - 145 mmol/L    Potassium 4.6 3.5 - 5.0 mmol/L    Chloride 103 98 - 107 mmol/L    Carbon Dioxide  (CO2) 18 (L) 22 - 31 mmol/L    Anion Gap 15 5 - 18 mmol/L    Urea Nitrogen 23 8 - 28 mg/dL    Creatinine 1.43 (H) 0.70 - 1.30 mg/dL    Calcium 10.1 8.5 - 10.5 mg/dL    Glucose 187 (H) 70 - 125 mg/dL    GFR Estimate 50 (L) >60 mL/min/1.73m2   INR    Collection Time: 12/25/21  8:00 PM   Result Value Ref Range    INR 1.17 (H) 0.85 - 1.15   Lactic acid whole blood    Collection Time: 12/25/21  8:13 PM   Result Value Ref Range    Lactic Acid 1.3 0.7 - 2.0 mmol/L   Asymptomatic COVID-19 Virus (Coronavirus) by PCR Nasopharyngeal    Collection Time: 12/25/21  8:48 PM    Specimen: Nasopharyngeal; Swab   Result Value Ref Range    SARS CoV2 PCR Negative Negative       DISCHARGE PLAN:    Follow up labs: Per PCP.    Medical Follow Up:      Follow up with primary care provider in 1 weeks    [facility] scheduled appointments:  None.    Discharge Services: None, will receive at [facility].    Discharge Instructions Verbalized to Patient at Discharge:     None    TOTAL DISCHARGE TIME:   Greater than 30 minutes  Electronically signed by:  Carol Ann Porras, CNP

## 2022-01-03 ENCOUNTER — OFFICE VISIT (OUTPATIENT)
Dept: INTERNAL MEDICINE | Facility: CLINIC | Age: 77
End: 2022-01-03
Payer: COMMERCIAL

## 2022-01-03 VITALS
DIASTOLIC BLOOD PRESSURE: 55 MMHG | OXYGEN SATURATION: 100 % | RESPIRATION RATE: 16 BRPM | WEIGHT: 226.4 LBS | SYSTOLIC BLOOD PRESSURE: 101 MMHG | HEIGHT: 72 IN | TEMPERATURE: 97.3 F | BODY MASS INDEX: 30.66 KG/M2 | HEART RATE: 45 BPM

## 2022-01-03 DIAGNOSIS — M10.00 ACUTE IDIOPATHIC GOUT, UNSPECIFIED SITE: ICD-10-CM

## 2022-01-03 DIAGNOSIS — M54.2 CERVICALGIA: Primary | ICD-10-CM

## 2022-01-03 DIAGNOSIS — M10.00 IDIOPATHIC GOUT, UNSPECIFIED CHRONICITY, UNSPECIFIED SITE: ICD-10-CM

## 2022-01-03 PROCEDURE — 99213 OFFICE O/P EST LOW 20 MIN: CPT | Performed by: NURSE PRACTITIONER

## 2022-01-03 RX ORDER — METHOCARBAMOL 500 MG/1
500 TABLET, FILM COATED ORAL 4 TIMES DAILY PRN
Qty: 60 TABLET | Refills: 0 | Status: SHIPPED | OUTPATIENT
Start: 2022-01-03 | End: 2022-02-01

## 2022-01-03 RX ORDER — COLCHICINE 0.6 MG/1
0.6 TABLET ORAL DAILY
Qty: 90 TABLET | Refills: 3 | Status: SHIPPED | OUTPATIENT
Start: 2022-01-03 | End: 2022-06-16 | Stop reason: SINTOL

## 2022-01-03 ASSESSMENT — MIFFLIN-ST. JEOR: SCORE: 1789.94

## 2022-01-03 NOTE — NURSING NOTE
Lance Ibrahim presents to the clinic today for removal of sutures and staple.  The patient has had the sutures and staple in place for 9 days.  There has been no history of infection or drainage.  1 sutures and staple are seen located on the left parietal scalp.  The wound is healing well with no signs of infection.  Tetanus status is up to date.   All sutures and staple were easily removed today.  Routine wound care discussed.  The patient will follow up as needed.

## 2022-01-03 NOTE — PROGRESS NOTES
Assessment & Plan     Cervicalgia    - Orthopedic  Referral; Future    Acute idiopathic gout, unspecified site    - colchicine (COLCYRS) 0.6 MG tablet; Take 1 tablet (0.6 mg) by mouth daily    Idiopathic gout, unspecified chronicity, unspecified site    - methocarbamol (ROBAXIN) 500 MG tablet; Take 1 tablet (500 mg) by mouth 4 times daily as needed for muscle spasms    Will be relocating for [facility] in [place], will transfer care there  Call with questions, concerns         BMI:   Estimated body mass index is 30.71 kg/m  as calculated from the following:    Height as of this encounter: 1.829 m (6').    Weight as of this encounter: 102.7 kg (226 lb 6.4 oz).           Anh Spivey NP  [facility] ZAID Lucas is a 76 year old who presents for the following health issues  accompanied by his son.    HPI     ED/UC Followup:    Facility:  [facility]  Date of visit: 12/25/21  Reason for visit: fall  Current Status: follow up       Gout/ Single Inflamed Joint  Onset/Duration: this week  Description:   Location: hand - right  Joint Swelling: YES  Redness: YES  Pain: YES  Intensity: moderate  Progression of Symptoms: waxing and waning  Accompanying Signs & Symptoms:  Fevers: no  History:   Trauma to the area: no  Previous history of gout: YES  Recent illness: no  Alcohol use: no  Diuretic use: no  Precipitating or alleviating factors: None  Therapies tried and outcome: none, anti-inflammatories, allopurinol and colchicine    Concern - neck pain  Onset: years  Description: ROM reduced  D/t pain  Intensity: moderate  Progression of Symptoms:  worsening  Accompanying Signs & Symptoms: none  Previous history of similar problem:   Precipitating factors:        Worsened by: none  Alleviating factors:        Improved by: MINDI, temporarily  Therapies tried and outcome:  none       Review of Systems   Constitutional, HEENT, cardiovascular,  pulmonary, gi and gu systems are negative, except as otherwise noted.      Objective    /55 (BP Location: Right arm, Patient Position: Chair, Cuff Size: Adult Large)   Pulse (!) 45   Temp 97.3  F (36.3  C) (Tympanic)   Resp 16   Ht 1.829 m (6')   Wt 102.7 kg (226 lb 6.4 oz)   SpO2 100%   BMI 30.71 kg/m      Physical Exam   GENERAL: alert, frail and elderly  MS: R hand redness, swelling all PIP, DIP joints  PSYCH: mentation appears normal, affect normal/bright

## 2022-01-05 ENCOUNTER — TELEPHONE (OUTPATIENT)
Dept: INTERNAL MEDICINE | Facility: CLINIC | Age: 77
End: 2022-01-05
Payer: COMMERCIAL

## 2022-01-05 NOTE — TELEPHONE ENCOUNTER
See message below. Pt also send a mychart message. Please print info and sign. Then can fax if OK.     Last OV on 1/3/22

## 2022-01-05 NOTE — TELEPHONE ENCOUNTER
Isa from [facility] calling   Patient will be living here and they need a signed med list and H&P  Ok to call and  [phone]  Fax: [phone]

## 2022-01-13 ENCOUNTER — LAB REQUISITION (OUTPATIENT)
Dept: LAB | Facility: CLINIC | Age: 77
End: 2022-01-13
Payer: COMMERCIAL

## 2022-01-13 DIAGNOSIS — Z20.822 CONTACT WITH AND (SUSPECTED) EXPOSURE TO COVID-19: ICD-10-CM

## 2022-01-14 LAB — SARS-COV-2 RNA RESP QL NAA+PROBE: NEGATIVE

## 2022-01-14 PROCEDURE — U0005 INFEC AGEN DETEC AMPLI PROBE: HCPCS | Mod: ORL | Performed by: NURSE PRACTITIONER

## 2022-01-17 ENCOUNTER — LAB REQUISITION (OUTPATIENT)
Dept: LAB | Facility: CLINIC | Age: 77
End: 2022-01-17
Payer: COMMERCIAL

## 2022-01-17 ENCOUNTER — HOSPITAL ENCOUNTER (EMERGENCY)
Facility: CLINIC | Age: 77
Discharge: HOME | End: 2022-01-17
Attending: EMERGENCY MEDICINE | Admitting: EMERGENCY MEDICINE
Payer: COMMERCIAL

## 2022-01-17 VITALS
BODY MASS INDEX: 30.92 KG/M2 | DIASTOLIC BLOOD PRESSURE: 89 MMHG | HEART RATE: 103 BPM | OXYGEN SATURATION: 97 % | RESPIRATION RATE: 18 BRPM | TEMPERATURE: 98.2 F | SYSTOLIC BLOOD PRESSURE: 153 MMHG | WEIGHT: 228 LBS

## 2022-01-17 DIAGNOSIS — Z20.822 CONTACT WITH AND (SUSPECTED) EXPOSURE TO COVID-19: ICD-10-CM

## 2022-01-17 DIAGNOSIS — L03.031 CELLULITIS OF TOE OF RIGHT FOOT: ICD-10-CM

## 2022-01-17 PROCEDURE — 99283 EMERGENCY DEPT VISIT LOW MDM: CPT

## 2022-01-17 RX ORDER — CLINDAMYCIN HCL 300 MG
300 CAPSULE ORAL 4 TIMES DAILY
Qty: 40 CAPSULE | Refills: 0 | Status: SHIPPED | OUTPATIENT
Start: 2022-01-17 | End: 2022-01-27

## 2022-01-17 ASSESSMENT — ENCOUNTER SYMPTOMS: WOUND: 1

## 2022-01-18 ENCOUNTER — DOCUMENTATION ONLY (OUTPATIENT)
Dept: ADMINISTRATIVE | Facility: CLINIC | Age: 77
End: 2022-01-18

## 2022-01-18 PROCEDURE — U0003 INFECTIOUS AGENT DETECTION BY NUCLEIC ACID (DNA OR RNA); SEVERE ACUTE RESPIRATORY SYNDROME CORONAVIRUS 2 (SARS-COV-2) (CORONAVIRUS DISEASE [COVID-19]), AMPLIFIED PROBE TECHNIQUE, MAKING USE OF HIGH THROUGHPUT TECHNOLOGIES AS DESCRIBED BY CMS-2020-01-R: HCPCS | Mod: ORL | Performed by: NURSE PRACTITIONER

## 2022-01-18 NOTE — ED PROVIDER NOTES
EMERGENCY DEPARTMENT ENCOUNTER      NAME: Lance Ibrahim  AGE: 76 year old male  YOB: 1945  MRN: [id number]  EVALUATION DATE & TIME: 1/17/2022  9:50 PM    PCP: Anh Spivey    ED PROVIDER: Todd Carl M.D.      Chief Complaint   Patient presents with     Wound Infection         FINAL IMPRESSION:  1. Cellulitis of toe of right foot          ED COURSE & MEDICAL DECISION MAKING:    Pertinent Labs & Imaging studies reviewed. (See chart for details)  76 year old male presents to the Emergency Department for evaluation of redness to his right great toe.  Has a history of infection.  Has had amputation of the left toe.  Patient is diabetic and has been seeing podiatry.  There is erythema and warmth of the right great toe.  Does not go up his foot.  He is afebrile.  We will start him on clindamycin as he is allergic to other antibiotics.  We will have him follow-up with his primary and podiatry.  Will return for any worsening symptoms.  No signs of sepsis or more severe infection.    10:11 PM I met with the patient to gather history and to perform my initial exam. I discussed the plan for care while in the Emergency Department. PPE: gloves, surgical mask, and eye protection. We discussed the plan for discharge and the patient is agreeable. Reviewed supportive cares, symptomatic treatment, outpatient follow up, and reasons to return to the Emergency Department. Patient to be discharged by ED RN.     At the conclusion of the encounter I discussed the results of all of the tests and the disposition. The questions were answered. The patient or family acknowledged understanding and was agreeable with the care plan.     MEDICATIONS GIVEN IN THE EMERGENCY:  Medications - No data to display    NEW PRESCRIPTIONS STARTED AT TODAY'S ER VISIT  Discharge Medication List as of 1/17/2022 10:17 PM      START taking these medications    Details   clindamycin (CLEOCIN) 300 MG capsule Take 1 capsule (300 mg) by mouth  4 times daily for 10 days, Disp-40 capsule, R-0, Local Print                =================================================================    HPI    Patient information was obtained from: Patient    Use of : N/A         Lance Ibrahim is a 76 year old male with a pertinent history of HTN, cardiomyopathy, DM2, CKD, transient cerebral ischemia, cellulitis of foot, gouty arthropathy, who presents to this ED via walk-in for evaluation of wound infection.    Patient reports diabetic foot ulcer on right great toe that started last year in February 2021. He states that since then, he has seen a podiatrist for wound debridement every month. Patient reports that this morning when taking off his sock, he noticed redness and swelling in right great toe. He states that he has had this in the past in left great toe. At that time, left great toe had to be amputated. Given his history with this, he became concerned and decided to be seen in the ED.     Otherwise, patient denies any fever. He reports a history of type II diabetes. He states he is on Metformin, denies any insulin. Patient states his sugars have been ranging between 155-175 and notes he does check this regularly. Patient reports allergies to Penicillin, Sulfa, and Ancef. He is COVID vaccinated and notes he gets tested every week with his last test being negative four days ago. No other complaints at this time.     REVIEW OF SYSTEMS   Review of Systems   Skin: Positive for wound (right great toe).        Positive for redness and swelling in right great toe   All other systems reviewed and are negative.    PAST MEDICAL HISTORY:  Past Medical History:   Diagnosis Date     Arthritis     osteoarthritis knees     BPH      CAD (coronary artery disease)     subtotal occlusion in the small distal LAD      Cardiomyopathy      Cerebral artery occlusion with cerebral infarction     TIA 1993, no residual     Chronic kidney disease      Chronic rhinitis      HTN  (hypertension)      Hyperlipidemia      Kidney stone      PVD (peripheral vascular disease)      Sleep apnea     doesn't use cpap     TIA (transient ischaemic attack) 1993     Type 2 diabetes mellitus - 11/08/2018     Ureteral stone        PAST SURGICAL HISTORY:  Past Surgical History:   Procedure Laterality Date     AMPUTATE TOE(S) Left 10/15/2018    Procedure: AMPUTATE TOE(S);  Left third toe amputation;  Surgeon: Ayaka Azevedo DPM, Podiatry/Foot and Ankle Surgery;  Location: [facility] OR     ARTHROPLASTY KNEE Right 12/07/2018    Procedure: Right total knee arthroplasty;  Surgeon: Issa Cunha MD;  Location: [facility] OR     COLONOSCOPY  03/09/2013    Procedure: COLONOSCOPY;  COLONOSCOPY;  Surgeon: Chau Hogan MD;  Location:  GI     CV HEART CATHETERIZATION WITH POSSIBLE INTERVENTION Left 03/05/2019    Procedure: Coronary Angiogram;  Surgeon: Nas Linda MD;  Location:  HEART CARDIAC CATH LAB     Diastasis recti repair  1985     EXTRACAPSULAR CATARACT EXTRATION WITH INTRAOCULAR LENS IMPLANT Left 03/13/2017     EXTRACAPSULAR CATARACT EXTRATION WITH INTRAOCULAR LENS IMPLANT Right      FOOT SURGERY  04/2013    cyst removal      HERNIA REPAIR  07/13/2004    ventral      ORIF Shoulder Left      Ventral hernia repair NOS  1987           CURRENT MEDICATIONS:    No current facility-administered medications for this encounter.     Current Outpatient Medications   Medication     clindamycin (CLEOCIN) 300 MG capsule     acetaminophen (TYLENOL) 500 MG tablet     aspirin (ASA) 81 MG EC tablet     atorvastatin (LIPITOR) 40 MG tablet     colchicine (COLCYRS) 0.6 MG tablet     diclofenac (VOLTAREN) 1 % topical gel     Ferrous Gluconate 240 (27 Fe) MG TABS     finasteride (PROSCAR) 5 MG tablet     fluticasone (FLONASE) 50 MCG/ACT spray     isosorbide mononitrate (IMDUR) 30 MG 24 hr tablet     labetalol (NORMODYNE) 200 MG tablet     Lidocaine (LIDOCARE) 4 % Patch     lisinopril (ZESTRIL) 10 MG tablet     loratadine  "(CLARITIN) 10 MG tablet     metFORMIN (GLUCOPHAGE) 1000 MG tablet     methocarbamol (ROBAXIN) 500 MG tablet     Multiple Vitamins-Minerals (MULTIVITAMIN ADULTS PO)     ONETOUCH ULTRA test strip     oxyCODONE (ROXICODONE) 5 MG tablet     predniSONE (DELTASONE) 10 MG tablet     silver sulfADIAZINE (SILVADENE) 1 % external cream     tamsulosin (FLOMAX) 0.4 MG 24 hr capsule     torsemide (DEMADEX) 20 MG tablet         ALLERGIES:  Allergies   Allergen Reactions     Penicillins Anaphylaxis     \"anaphylactic\"     Sulfa Drugs      \"itchy rash, swelling of face and hands\"     Ancef [Cefazolin] Rash       FAMILY HISTORY:  Family History   Problem Relation Age of Onset     Family History Negative Mother          88 yo     Cancer Father          76 yo brain     Diabetes Maternal Grandfather          89 yo     Alcohol/Drug Paternal Grandfather              Colon Cancer No family hx of        SOCIAL HISTORY:   Social History     Socioeconomic History     Marital status:      Spouse name: Not on file     Number of children: Not on file     Years of education: Not on file     Highest education level: Not on file   Occupational History     Employer: SELF   Tobacco Use     Smoking status: Former Smoker     Packs/day: 0.00     Quit date: 3/17/1993     Years since quittin.8     Smokeless tobacco: Never Used   Substance and Sexual Activity     Alcohol use: Not Currently     Drug use: No     Sexual activity: Never   Other Topics Concern     Parent/sibling w/ CABG, MI or angioplasty before 65F 55M? Not Asked   Social History Narrative     Not on file     Social Determinants of Health     Financial Resource Strain: Not on file   Food Insecurity: Not on file   Transportation Needs: Not on file   Physical Activity: Not on file   Stress: Not on file   Social Connections: Not on file   Intimate Partner Violence: Not on file   Housing Stability: Not on file       VITALS:  BP (!) 153/89   Pulse 103   " Temp 98.2  F (36.8  C) (Oral)   Resp 18   Wt 103.4 kg (228 lb)   SpO2 97%   BMI 30.92 kg/m      PHYSICAL EXAM    Physical Exam  Constitutional:       General: He is not in acute distress.     Appearance: He is not diaphoretic.   HENT:      Head: Atraumatic.   Eyes:      General: No scleral icterus.     Pupils: Pupils are equal, round, and reactive to light.   Cardiovascular:      Heart sounds: Normal heart sounds.   Pulmonary:      Effort: No respiratory distress.      Breath sounds: Normal breath sounds.   Abdominal:      General: Bowel sounds are normal.      Palpations: Abdomen is soft.      Tenderness: There is no abdominal tenderness.   Musculoskeletal:         General: No tenderness.      Comments: There is an ulcer on the bottom of his right toe.  This is actually clean.  There is erythema surrounding the nail of his right great toe.  No streaking.  No warmth of the foot.  Pulses intact.   Skin:     General: Skin is warm.      Findings: No rash.           LAB:  All pertinent labs reviewed and interpreted.  Labs Ordered and Resulted from Time of ED Arrival to Time of ED Departure - No data to display    RADIOLOGY:  Reviewed all pertinent imaging. Please see official radiology report.  No orders to display         I, Margarita Palma, am serving as a scribe to document services personally performed by Dr. Todd Carl, based on my observation and the provider's statements to me. I, Todd Carl MD attest that Margarita ellis is acting in a scribe capacity, has observed my performance of the services and has documented them in accordance with my direction.    Todd Carl M.D.  Emergency Medicine  [facility] EMERGENCY ROOM  [address] [address] [phone]  Dept: [phone]     Todd Carl MD  01/18/22 0131

## 2022-01-18 NOTE — ED TRIAGE NOTES
Patient reports that he has had a diabetic foot ulcer for about year to R great toe, he had been getting it debrided at podiatry, but had not been going the past month or so, r/t being admitted to the hospital for 10 days for gout, of note his wife  last month.  Tonight noticed that the wound was red, denies pain.  Alexsandra Amador RN.......2022 9:47 PM

## 2022-01-19 LAB — SARS-COV-2 RNA RESP QL NAA+PROBE: NEGATIVE

## 2022-01-20 ENCOUNTER — LAB REQUISITION (OUTPATIENT)
Dept: LAB | Facility: CLINIC | Age: 77
End: 2022-01-20
Payer: COMMERCIAL

## 2022-01-20 DIAGNOSIS — Z20.822 CONTACT WITH AND (SUSPECTED) EXPOSURE TO COVID-19: ICD-10-CM

## 2022-01-21 PROCEDURE — U0005 INFEC AGEN DETEC AMPLI PROBE: HCPCS | Mod: ORL | Performed by: NURSE PRACTITIONER

## 2022-01-22 LAB — SARS-COV-2 RNA RESP QL NAA+PROBE: NEGATIVE

## 2022-01-24 ENCOUNTER — LAB REQUISITION (OUTPATIENT)
Dept: LAB | Facility: CLINIC | Age: 77
End: 2022-01-24
Payer: COMMERCIAL

## 2022-01-24 DIAGNOSIS — Z20.822 CONTACT WITH AND (SUSPECTED) EXPOSURE TO COVID-19: ICD-10-CM

## 2022-01-25 PROCEDURE — U0003 INFECTIOUS AGENT DETECTION BY NUCLEIC ACID (DNA OR RNA); SEVERE ACUTE RESPIRATORY SYNDROME CORONAVIRUS 2 (SARS-COV-2) (CORONAVIRUS DISEASE [COVID-19]), AMPLIFIED PROBE TECHNIQUE, MAKING USE OF HIGH THROUGHPUT TECHNOLOGIES AS DESCRIBED BY CMS-2020-01-R: HCPCS | Mod: ORL | Performed by: NURSE PRACTITIONER

## 2022-01-26 ENCOUNTER — LAB REQUISITION (OUTPATIENT)
Dept: LAB | Facility: CLINIC | Age: 77
End: 2022-01-26
Payer: COMMERCIAL

## 2022-01-26 ENCOUNTER — TELEPHONE (OUTPATIENT)
Dept: INTERNAL MEDICINE | Facility: CLINIC | Age: 77
End: 2022-01-26

## 2022-01-26 ENCOUNTER — OFFICE VISIT (OUTPATIENT)
Dept: PODIATRY | Facility: CLINIC | Age: 77
End: 2022-01-26
Payer: COMMERCIAL

## 2022-01-26 VITALS
WEIGHT: 237 LBS | DIASTOLIC BLOOD PRESSURE: 80 MMHG | BODY MASS INDEX: 32.1 KG/M2 | HEIGHT: 72 IN | SYSTOLIC BLOOD PRESSURE: 134 MMHG

## 2022-01-26 DIAGNOSIS — Z20.822 CONTACT WITH AND (SUSPECTED) EXPOSURE TO COVID-19: ICD-10-CM

## 2022-01-26 DIAGNOSIS — I10 ESSENTIAL HYPERTENSION: ICD-10-CM

## 2022-01-26 DIAGNOSIS — M20.21 HALLUX RIGIDUS OF BOTH FEET: ICD-10-CM

## 2022-01-26 DIAGNOSIS — I87.2 EDEMA OF BOTH LOWER EXTREMITIES DUE TO PERIPHERAL VENOUS INSUFFICIENCY: ICD-10-CM

## 2022-01-26 DIAGNOSIS — M20.22 HALLUX RIGIDUS OF BOTH FEET: ICD-10-CM

## 2022-01-26 DIAGNOSIS — Z89.422 H/O AMPUTATION OF LESSER TOE, LEFT (H): Primary | ICD-10-CM

## 2022-01-26 DIAGNOSIS — E66.01 MORBID OBESITY (H): ICD-10-CM

## 2022-01-26 DIAGNOSIS — E11.51 TYPE 2 DIABETES MELLITUS WITH PERIPHERAL VASCULAR DISEASE (H): ICD-10-CM

## 2022-01-26 DIAGNOSIS — I42.9 CARDIOMYOPATHY, UNSPECIFIED TYPE (H): ICD-10-CM

## 2022-01-26 DIAGNOSIS — R06.09 DYSPNEA ON EXERTION: ICD-10-CM

## 2022-01-26 DIAGNOSIS — R07.9 CHEST PAIN, UNSPECIFIED TYPE: ICD-10-CM

## 2022-01-26 DIAGNOSIS — L97.512 CHRONIC ULCER OF RIGHT GREAT TOE WITH FAT LAYER EXPOSED (H): ICD-10-CM

## 2022-01-26 LAB — SARS-COV-2 RNA RESP QL NAA+PROBE: NEGATIVE

## 2022-01-26 PROCEDURE — 11042 DBRDMT SUBQ TIS 1ST 20SQCM/<: CPT | Performed by: PODIATRIST

## 2022-01-26 RX ORDER — LISINOPRIL 10 MG/1
10 TABLET ORAL DAILY
Qty: 90 TABLET | Refills: 3 | Status: SHIPPED | OUTPATIENT
Start: 2022-01-26 | End: 2022-08-25

## 2022-01-26 RX ORDER — ISOSORBIDE MONONITRATE 30 MG/1
30 TABLET, EXTENDED RELEASE ORAL DAILY
Qty: 90 TABLET | Refills: 3 | Status: SHIPPED | OUTPATIENT
Start: 2022-01-26 | End: 2023-03-07

## 2022-01-26 ASSESSMENT — MIFFLIN-ST. JEOR: SCORE: 1838.02

## 2022-01-26 NOTE — PROGRESS NOTES
Podiatry / Foot and Ankle Surgery Progress Note    January 26, 2022    Subject: Patient was seen for reassessment of chronic right foot ulcer.  Notes that he has been doing Silvadene cream to the ulcer.  He was in the hospital for over a month with bad gout.  Notes that about 2 weeks ago the right great toe was very red and sore.  He is currently on clindamycin and that has helped.  Denies fever, nausea, vomiting.    Objective:  Vitals: /80   Ht 1.829 m (6')   Wt 107.5 kg (237 lb)   BMI 32.14 kg/m    BMI= Body mass index is 32.14 kg/m .    A1C: 7.5 (11/2021)     General:  Patient is alert and orientated.  NAD.     Vascular:  DP and PT pulses are palpable.  significant Edema and varicosities noted.  CFT's < 3secs.  Skin temp is normal      Neuro:  Light and gross touch sensation is absent to feet.      Derm: Right foot-full thickness ulcer to the plantar aspect of the right IPJ.  After debridement this measures 3.5cm x 1.4cm x 0.2 cm.  base of the wound is granular.  No purulent drainage, redness or signs of acute infection noted.  There is heavy clear serous drainage noted from the wound.  All other ulcerations to the right foot are healed at this time.     Left foot-all ulcerations to the left foot are healed there are no open lesions or signs of infection noted.     Musculoskeletal:  Decrease arch height. Lateral deviation of the right and left halluxes.  Significant decreased range of motion of the right first metatarsal phalangeal joint and inner phalangeal joint.     Assessment:      H/O amputation of lesser toe, left (H)  Chronic ulcer of right great toe with fat layer exposed (H)  Type 2 diabetes mellitus with peripheral vascular disease (H)  Morbid obesity (H)  Edema of both lower extremities due to peripheral venous insufficiency  Hallux rigidus of both feet     Medical Decision Making/Plan: At this time the wound was debrided.  Please see procedure note below.  We will have him apply Iodosorb gel  to the right foot ulcer as the primary dressing daily and cover with a secondary dressing of4x4 gauze pads x 2 and 3 inch gauze roll and tape.     Recommend elevating the feet to help with swelling as he notes he cannot wear his compression socks has difficulty putting them on.  Discussed that the swelling can contribute to the delayed healing of the wounds.  He will continue in the offloading shoes We will have him follow-up in 3 weeks for reassessment of the ulcerations.  All questions were answered to patient satisfaction he will call further questions or concerns.     Procedure: After verbal consent, excisional debridement was performed on ulcers.  #15 blade was used to debride ulcers down to and including subcutaneous tissue. Bleeding controlled with light pressure.   No drainage noted.  No anesthesia was used due to neuropathy. Dry dressing applied to foot.  Patient tolerated procedure well.      Patient Risk Factor:  Patient is a high risk factor for infection.     Ayaka Azevedo DPM, Podiatry/Foot and Ankle Surgery

## 2022-01-26 NOTE — TELEPHONE ENCOUNTER
Pt sent message with his pharmacy information.   Updated his pharmacy list.       [id number] [address] [place] [address]

## 2022-01-26 NOTE — LETTER
1/26/2022         RE: Lance Ibrahim  [address] [place] [id number]        Dear Colleague,    Thank you for referring your patient, Lance Ibrahim, to the [facility] POD[facility]. Please see a copy of my visit note below.    Podiatry / Foot and Ankle Surgery Progress Note    January 26, 2022    Subject: Patient was seen for reassessment of chronic right foot ulcer.  Notes that he has been doing Silvadene cream to the ulcer.  He was in the hospital for over a month with bad gout.  Notes that about 2 weeks ago the right great toe was very red and sore.  He is currently on clindamycin and that has helped.  Denies fever, nausea, vomiting.    Objective:  Vitals: /80   Ht 1.829 m (6')   Wt 107.5 kg (237 lb)   BMI 32.14 kg/m    BMI= Body mass index is 32.14 kg/m .    A1C: 7.5 (11/2021)     General:  Patient is alert and orientated.  NAD.     Vascular:  DP and PT pulses are palpable.  significant Edema and varicosities noted.  CFT's < 3secs.  Skin temp is normal      Neuro:  Light and gross touch sensation is absent to feet.      Derm: Right foot-full thickness ulcer to the plantar aspect of the right IPJ.  After debridement this measures 3.5cm x 1.4cm x 0.2 cm.  base of the wound is granular.  No purulent drainage, redness or signs of acute infection noted.  There is heavy clear serous drainage noted from the wound.  All other ulcerations to the right foot are healed at this time.     Left foot-all ulcerations to the left foot are healed there are no open lesions or signs of infection noted.     Musculoskeletal:  Decrease arch height. Lateral deviation of the right and left halluxes.  Significant decreased range of motion of the right first metatarsal phalangeal joint and inner phalangeal joint.     Assessment:      H/O amputation of lesser toe, left (H)  Chronic ulcer of right great toe with fat layer exposed (H)  Type 2 diabetes mellitus with peripheral vascular disease (H)  Morbid obesity  (H)  Edema of both lower extremities due to peripheral venous insufficiency  Hallux rigidus of both feet     Medical Decision Making/Plan: At this time the wound was debrided.  Please see procedure note below.  We will have him apply Iodosorb gel to the right foot ulcer as the primary dressing daily and cover with a secondary dressing of4x4 gauze pads x 2 and 3 inch gauze roll and tape.     Recommend elevating the feet to help with swelling as he notes he cannot wear his compression socks has difficulty putting them on.  Discussed that the swelling can contribute to the delayed healing of the wounds.  He will continue in the offloading shoes We will have him follow-up in 3 weeks for reassessment of the ulcerations.  All questions were answered to patient satisfaction he will call further questions or concerns.     Procedure: After verbal consent, excisional debridement was performed on ulcers.  #15 blade was used to debride ulcers down to and including subcutaneous tissue. Bleeding controlled with light pressure.   No drainage noted.  No anesthesia was used due to neuropathy. Dry dressing applied to foot.  Patient tolerated procedure well.      Patient Risk Factor:  Patient is a high risk factor for infection.     Ayaka Azevedo DPM, Podiatry/Foot and Ankle Surgery        Again, thank you for allowing me to participate in the care of your patient.        Sincerely,        Ayaka Azevedo DPM, Podiatry/Foot and Ankle Surgery

## 2022-01-26 NOTE — PATIENT INSTRUCTIONS
Thank you for choosing [facility] Podiatry / Foot & Ankle Surgery!    DR. RANDHAWA'S CLINIC:  [facility]   [id number]   [place] [address]   [phone]  [phone]      SCHEDULE SURGERY: [phone]   BILLING QUESTIONS: [phone]   APPOINTMENTS: [phone]   RADIOLOGY: [phone]   CONSUMER PRICE LINE: [phone]      Follow up: 3 weeks    FOOT ULCER (WOUND) EDUCATION  Ulceration ofthe foot involves a break or hole in the skin. Skin is our best protection against infection. Skin is quite durable, however, the underlying tissues are fragile. For this reason, the wound is likely to deepen rapidly. Deep wounds usually get infected and require amputation. Prompt healing is therefore essential to avoid limb loss.     Foot ulcers do not heal without intervention. Walking on the foot and living your normal life is not typically compatible with healing the sore. Successful healing will require several months of significant alteration of your daily activities.   Ulcer complications frequently develop. This primarily includes infection of skin, which then spreads deep into your joints, bones and tendons. Spreading infection may travel up your leg and into other parts of your body. Deep infection is usually treated with amputation ofpart ofyour foot or your leg. Signs of infection include fever, chills, nausea, vomiting, erratic blood sugars, local redness, pus, strong odor and localized warmth. Signs of infection should be taken seriously. Prompt evaluation in the clinic or hospital emergency room is required.   Ulcer treatment requires debridement or surgical removal of devitalized tissue. Your doctor will trim away callused, moistened, unhealthy tissue from the wound surface and margin. This helps to clean the wound and allows proper inspection. Debridement also stimulates healing even though the wound originally appears larger. Expect some bleeding with each debridement.  You will be given instruction regarding wound bandaging. This often includes ointment and gauze. Avoid tape directly on the skin. Hand washing is essential since most infections will come from your fingertips. Ulcer care requires a no touch technique. Your fingers should not touch the margin or base of the wound.    HELPFUL HEALING TIPS:  1. Debridement: Getting rid of bad tissue makes way for good tissue to promote healing  2. Addressing Foot Deformities: Hammertoes and bunions can cause increased pressure  3. Pressure Reduction: If pressure remains to the wound, it won't heal  4. Good Pulses: If bloodflow is not getting to the foot, the ulcer will not heal  5. Good Nutrition: If you are not getting proper nutrition your body can't heal.Protein! At least 90g a day.  Supplements are a good way to help get this, such as Antonio, Glucerna, Ensure. Also taking 5000units of Vitamin D a day.   6. Infection Control: Keep the ulcer clean with wound cleanser. DO NOT SOAK IT!  7. Moisture Control: Keep edema down and make sure that drainage is getting pulled away from the ulcer    IMPORTANCE OF DEBRIDEMENT    Reduces bioburden to help control or reduce infection. Even if an ulcer is not  infected,  the bacterial bioburden causes increased local inflammation.    Allows more accurate visualization of the wound base and edges, which allows for more precise staging.    Removes necrotic/non-viable tissue, which impedes wound healing, causes protein loss and can be a nidus for infection.    Stimulates new circulation (angiogenesis) and allows adequate oxygen delivery to the wound.    Removes undermining and tunneling, and may help reduce abscess formation.    Releases healing growth factors at the edge of the wound.    Prepares the wound bed by leaving only tissues that are capable of regenerating.

## 2022-01-29 ENCOUNTER — TELEPHONE (OUTPATIENT)
Dept: INTERNAL MEDICINE | Facility: CLINIC | Age: 77
End: 2022-01-29
Payer: COMMERCIAL

## 2022-01-29 DIAGNOSIS — E11.51 TYPE 2 DIABETES MELLITUS WITH PERIPHERAL VASCULAR DISEASE (H): Primary | ICD-10-CM

## 2022-01-29 NOTE — TELEPHONE ENCOUNTER
Reason for Call:  Medication or medication refill:    Do you use a [facility]?  Name of the pharmacy and phone number for the current request:  [facility] - [phone]    Name of the medication requested: [employer] Contour + Glucose Monitoring System    Other request: lancets and test strips as well    Can we leave a detailed message on this number? YES    Phone number patient can be reached at: Cell number on file:    Telephone Information:   Mobile [phone]       Best Time: Anytime    Call taken on 1/29/2022 at 10:19 AM by Emma Esqueda

## 2022-01-31 ENCOUNTER — LAB REQUISITION (OUTPATIENT)
Dept: LAB | Facility: CLINIC | Age: 77
End: 2022-01-31
Payer: COMMERCIAL

## 2022-01-31 ENCOUNTER — TELEPHONE (OUTPATIENT)
Dept: INTERNAL MEDICINE | Facility: CLINIC | Age: 77
End: 2022-01-31
Payer: COMMERCIAL

## 2022-01-31 DIAGNOSIS — Z20.822 CONTACT WITH AND (SUSPECTED) EXPOSURE TO COVID-19: ICD-10-CM

## 2022-01-31 DIAGNOSIS — I10 ESSENTIAL HYPERTENSION: Primary | ICD-10-CM

## 2022-01-31 NOTE — TELEPHONE ENCOUNTER
Patient sent the following CRM request via [employer]:    Lance Ibrahim, Germain CONSTANTINO, RN 2 days ago     I called [id number] at [phone].  They said that the prescription for Amlodipine had been cancelled by the doctor.  Does that mean that I should stop taking that medication?    Please clarify. This was on hospital discharge summary on 12/3/21. Per geriatrics office visit notes on 12/13/21 and 12/21/21:    Essential hypertension  - chronic, limited data  - continue on labetalol, lisinopril, amlodipine Torsemide  - recheck BMP this week  - VS per unit protocol    But medication is not on the medication list from either of those office visits.

## 2022-02-01 LAB — SARS-COV-2 RNA RESP QL NAA+PROBE: NORMAL

## 2022-02-01 PROCEDURE — U0003 INFECTIOUS AGENT DETECTION BY NUCLEIC ACID (DNA OR RNA); SEVERE ACUTE RESPIRATORY SYNDROME CORONAVIRUS 2 (SARS-COV-2) (CORONAVIRUS DISEASE [COVID-19]), AMPLIFIED PROBE TECHNIQUE, MAKING USE OF HIGH THROUGHPUT TECHNOLOGIES AS DESCRIBED BY CMS-2020-01-R: HCPCS | Mod: ORL | Performed by: NURSE PRACTITIONER

## 2022-02-01 RX ORDER — AMLODIPINE BESYLATE 5 MG/1
5 TABLET ORAL DAILY
Qty: 90 TABLET | Refills: 3 | Status: SHIPPED | OUTPATIENT
Start: 2022-02-01 | End: 2022-08-25

## 2022-02-01 NOTE — TELEPHONE ENCOUNTER
Pt calls and asks about this. Pt states Contour meter is covered now. ryan faxed in generic meter so pharmacy can fill what is covered by insurance.

## 2022-02-02 ENCOUNTER — LAB REQUISITION (OUTPATIENT)
Dept: LAB | Facility: CLINIC | Age: 77
End: 2022-02-02
Payer: COMMERCIAL

## 2022-02-02 DIAGNOSIS — Z20.822 CONTACT WITH AND (SUSPECTED) EXPOSURE TO COVID-19: ICD-10-CM

## 2022-02-02 LAB — SARS-COV-2 RNA RESP QL NAA+PROBE: NOT DETECTED

## 2022-02-07 ENCOUNTER — LAB REQUISITION (OUTPATIENT)
Dept: LAB | Facility: CLINIC | Age: 77
End: 2022-02-07
Payer: COMMERCIAL

## 2022-02-07 DIAGNOSIS — Z20.822 CONTACT WITH AND (SUSPECTED) EXPOSURE TO COVID-19: ICD-10-CM

## 2022-02-08 PROCEDURE — U0005 INFEC AGEN DETEC AMPLI PROBE: HCPCS | Mod: ORL | Performed by: NURSE PRACTITIONER

## 2022-02-09 ENCOUNTER — LAB REQUISITION (OUTPATIENT)
Dept: LAB | Facility: CLINIC | Age: 77
End: 2022-02-09
Payer: COMMERCIAL

## 2022-02-09 DIAGNOSIS — Z20.822 CONTACT WITH AND (SUSPECTED) EXPOSURE TO COVID-19: ICD-10-CM

## 2022-02-10 LAB — SARS-COV-2 RNA RESP QL NAA+PROBE: NOT DETECTED

## 2022-02-14 ENCOUNTER — LAB REQUISITION (OUTPATIENT)
Dept: LAB | Facility: CLINIC | Age: 77
End: 2022-02-14
Payer: COMMERCIAL

## 2022-02-14 DIAGNOSIS — Z20.822 CONTACT WITH AND (SUSPECTED) EXPOSURE TO COVID-19: ICD-10-CM

## 2022-02-17 ENCOUNTER — LAB REQUISITION (OUTPATIENT)
Dept: LAB | Facility: CLINIC | Age: 77
End: 2022-02-17
Payer: COMMERCIAL

## 2022-02-17 DIAGNOSIS — Z20.822 CONTACT WITH AND (SUSPECTED) EXPOSURE TO COVID-19: ICD-10-CM

## 2022-02-21 ENCOUNTER — LAB REQUISITION (OUTPATIENT)
Dept: LAB | Facility: CLINIC | Age: 77
End: 2022-02-21
Payer: COMMERCIAL

## 2022-02-21 DIAGNOSIS — Z20.822 CONTACT WITH AND (SUSPECTED) EXPOSURE TO COVID-19: ICD-10-CM

## 2022-02-22 PROCEDURE — U0005 INFEC AGEN DETEC AMPLI PROBE: HCPCS | Mod: ORL | Performed by: NURSE PRACTITIONER

## 2022-02-23 LAB — SARS-COV-2 RNA RESP QL NAA+PROBE: NEGATIVE

## 2022-02-24 ENCOUNTER — LAB REQUISITION (OUTPATIENT)
Dept: LAB | Facility: CLINIC | Age: 77
End: 2022-02-24
Payer: COMMERCIAL

## 2022-02-24 DIAGNOSIS — Z20.822 CONTACT WITH AND (SUSPECTED) EXPOSURE TO COVID-19: ICD-10-CM

## 2022-02-28 ENCOUNTER — LAB REQUISITION (OUTPATIENT)
Dept: LAB | Facility: CLINIC | Age: 77
End: 2022-02-28
Payer: COMMERCIAL

## 2022-02-28 DIAGNOSIS — Z20.822 CONTACT WITH AND (SUSPECTED) EXPOSURE TO COVID-19: ICD-10-CM

## 2022-03-01 ENCOUNTER — OFFICE VISIT (OUTPATIENT)
Dept: PODIATRY | Facility: CLINIC | Age: 77
End: 2022-03-01
Payer: COMMERCIAL

## 2022-03-01 VITALS
DIASTOLIC BLOOD PRESSURE: 78 MMHG | WEIGHT: 138 LBS | SYSTOLIC BLOOD PRESSURE: 164 MMHG | BODY MASS INDEX: 18.69 KG/M2 | HEIGHT: 72 IN

## 2022-03-01 DIAGNOSIS — E11.621 DIABETIC ULCER OF OTHER PART OF RIGHT FOOT ASSOCIATED WITH TYPE 2 DIABETES MELLITUS, WITH FAT LAYER EXPOSED (H): ICD-10-CM

## 2022-03-01 DIAGNOSIS — E11.42 DIABETIC POLYNEUROPATHY ASSOCIATED WITH TYPE 2 DIABETES MELLITUS (H): Primary | ICD-10-CM

## 2022-03-01 DIAGNOSIS — I87.2 EDEMA OF BOTH LOWER EXTREMITIES DUE TO PERIPHERAL VENOUS INSUFFICIENCY: ICD-10-CM

## 2022-03-01 DIAGNOSIS — M20.21 HALLUX RIGIDUS OF BOTH FEET: ICD-10-CM

## 2022-03-01 DIAGNOSIS — L97.512 DIABETIC ULCER OF OTHER PART OF RIGHT FOOT ASSOCIATED WITH TYPE 2 DIABETES MELLITUS, WITH FAT LAYER EXPOSED (H): ICD-10-CM

## 2022-03-01 DIAGNOSIS — E66.01 MORBID OBESITY (H): ICD-10-CM

## 2022-03-01 DIAGNOSIS — M20.22 HALLUX RIGIDUS OF BOTH FEET: ICD-10-CM

## 2022-03-01 DIAGNOSIS — Z89.422 H/O AMPUTATION OF LESSER TOE, LEFT (H): ICD-10-CM

## 2022-03-01 PROCEDURE — 11042 DBRDMT SUBQ TIS 1ST 20SQCM/<: CPT | Performed by: PODIATRIST

## 2022-03-01 PROCEDURE — U0005 INFEC AGEN DETEC AMPLI PROBE: HCPCS | Mod: ORL | Performed by: NURSE PRACTITIONER

## 2022-03-01 NOTE — PROGRESS NOTES
Podiatry / Foot and Ankle Surgery Progress Note    March 1, 2022    Subject: Patient was seen for reassessment of chronic right foot ulcer.  Notes that he has been doing Iodosorb gel to the ulcer.    He has been wearing his offloading shoe at all times.  No pain but does have baseline neuropathy. Denies fever, nausea, vomiting.    Objective:  Vitals: BP (!) 164/78   Ht 1.829 m (6')   Wt 62.6 kg (138 lb)   BMI 18.72 kg/m    BMI= Body mass index is 18.72 kg/m .    A1C: 7.5 (11/2021)     General:  Patient is alert and orientated.  NAD.     Vascular:  DP and PT pulses are palpable.  significant Edema and varicosities noted.  CFT's < 3secs.  Skin temp is normal      Neuro:  Light and gross touch sensation is absent to feet.      Derm: Right foot-full thickness ulcer to the plantar aspect of the right IPJ is now into 2 smaller ulcerations measuring 0.5 x 0.5 x 0.1 and 1 cm x 0.5 x 0.2 cm.  Bases of the wounds are granular.  No purulent drainage, redness or signs of acute infection noted.  There is heavy clear serous drainage noted from the wound.  All other ulcerations to the right foot are healed at this time.     Left foot-all ulcerations to the left foot are healed there are no open lesions or signs of infection noted.     Musculoskeletal:  Decrease arch height. Lateral deviation of the right and left halluxes.  Significant decreased range of motion of the right first metatarsal phalangeal joint and inner phalangeal joint.     Assessment:       Diabetic polyneuropathy associated with type 2 diabetes mellitus (H)  Diabetic ulcer of other part of right foot associated with type 2 diabetes mellitus, with fat layer exposed (H)  H/O amputation of lesser toe, left (H)  Edema of both lower extremities due to peripheral venous insufficiency  Hallux rigidus of both feet  Morbid obesity (H)     Medical Decision Making/Plan: At this time the wounds were debrided.  Please see procedure note below.  We will have him continue to  apply Iodosorb gel to the right foot ulcer as the primary dressing daily and cover with a secondary dressing of4x4 gauze pads x 2 and 3 inch gauze roll and tape.     Recommend elevating the feet to help with swelling as he notes he cannot wear his compression socks has difficulty putting them on.  He was given Tensogrip today to help with swelling to the foot and leg.  Discussed that the swelling can contribute to the delayed healing of the wounds.  He will continue in the offloading shoes We will have him follow-up in 4 weeks for reassessment of the ulcerations.  All questions were answered to patient satisfaction he will call further questions or concerns.     Procedure: After verbal consent, excisional debridement was performed on ulcers.  #15 blade was used to debride ulcers down to and including subcutaneous tissue. Bleeding controlled with light pressure.   No drainage noted.  No anesthesia was used due to neuropathy. Dry dressing applied to foot.  Patient tolerated procedure well.      Patient Risk Factor:  Patient is a high risk factor for infection.       Ayaka Azevedo DPM, Podiatry/Foot and Ankle Surgery

## 2022-03-01 NOTE — PATIENT INSTRUCTIONS
Thank you for choosing [facility] Podiatry / Foot & Ankle Surgery!    DR RANDHAWA'S CLINIC:  [facility]   [id number]   [place] [address]      TRIAGE LINE: [phone] - [address]  APPOINTMENTS: [phone]  RADIOLOGY: [phone]  SET UP SURGERY: [phone]  BILLING QUESTIONS: [phone]  FAX: [phone]       Follow up: 1 month  FOOT ULCER (WOUND) EDUCATION  Ulceration ofthe foot involves a break or hole in the skin. Skin is our best protection against infection. Skin is quite durable, however, the underlying tissues are fragile. For this reason, the wound is likely to deepen rapidly. Deep wounds usually get infected and require amputation. Prompt healing is therefore essential to avoid limb loss.     Foot ulcers do not heal without intervention. Walking on the foot and living your normal life is not typically compatible with healing the sore. Successful healing will require several months of significant alteration of your daily activities.   Ulcer complications frequently develop. This primarily includes infection of skin, which then spreads deep into your joints, bones and tendons. Spreading infection may travel up your leg and into other parts of your body. Deep infection is usually treated with amputation ofpart ofyour foot or your leg. Signs of infection include fever, chills, nausea, vomiting, erratic blood sugars, local redness, pus, strong odor and localized warmth. Signs of infection should be taken seriously. Prompt evaluation in the clinic or hospital emergency room is required.   Ulcer treatment requires debridement or surgical removal of devitalized tissue. Your doctor will trim away callused, moistened, unhealthy tissue from the wound surface and margin. This helps to clean the wound and allows proper inspection. Debridement also stimulates healing even though the wound originally appears larger. Expect some bleeding with each debridement. You will be given  instruction regarding wound bandaging. This often includes ointment and gauze. Avoid tape directly on the skin. Hand washing is essential since most infections will come from your fingertips. Ulcer care requires a no touch technique. Your fingers should not touch the margin or base of the wound.    HELPFUL HEALING TIPS:  1. Debridement: Getting rid of bad tissue makes way for good tissue to promote healing  2. Addressing Foot Deformities: Hammertoes and bunions can cause increased pressure  3. Pressure Reduction: If pressure remains to the wound, it won't heal  4. Good Pulses: If bloodflow is not getting to the foot, the ulcer will not heal  5. Good Nutrition: If you are not getting proper nutrition your body can't heal.Protein! At least 90g a day.  Supplements are a good way to help get this, such as Antonio, Glucerna, Ensure. Also taking 5000units of Vitamin D a day.   6. Infection Control: Keep the ulcer clean with wound cleanser. DO NOT SOAK IT!  7. Moisture Control: Keep edema down and make sure that drainage is getting pulled away from the ulcer    IMPORTANCE OF DEBRIDEMENT    Reduces bioburden to help control or reduce infection. Even if an ulcer is not  infected,  the bacterial bioburden causes increased local inflammation.    Allows more accurate visualization of the wound base and edges, which allows for more precise staging.    Removes necrotic/non-viable tissue, which impedes wound healing, causes protein loss and can be a nidus for infection.    Stimulates new circulation (angiogenesis) and allows adequate oxygen delivery to the wound.    Removes undermining and tunneling, and may help reduce abscess formation.    Releases healing growth factors at the edge of the wound.    Prepares the wound bed by leaving only tissues that are capable of regenerating.

## 2022-03-01 NOTE — LETTER
3/1/2022         RE: Lance Ibrahim  [address] [place] [id number]        Dear Colleague,    Thank you for referring your patient, Lance Ibrahim, to the [facility] PODI[facility]. Please see a copy of my visit note below.    Podiatry / Foot and Ankle Surgery Progress Note    March 1, 2022    Subject: Patient was seen for reassessment of chronic right foot ulcer.  Notes that he has been doing Iodosorb gel to the ulcer.    He has been wearing his offloading shoe at all times.  No pain but does have baseline neuropathy. Denies fever, nausea, vomiting.    Objective:  Vitals: BP (!) 164/78   Ht 1.829 m (6')   Wt 62.6 kg (138 lb)   BMI 18.72 kg/m    BMI= Body mass index is 18.72 kg/m .    A1C: 7.5 (11/2021)     General:  Patient is alert and orientated.  NAD.     Vascular:  DP and PT pulses are palpable.  significant Edema and varicosities noted.  CFT's < 3secs.  Skin temp is normal      Neuro:  Light and gross touch sensation is absent to feet.      Derm: Right foot-full thickness ulcer to the plantar aspect of the right IPJ is now into 2 smaller ulcerations measuring 0.5 x 0.5 x 0.1 and 1 cm x 0.5 x 0.2 cm.  Bases of the wounds are granular.  No purulent drainage, redness or signs of acute infection noted.  There is heavy clear serous drainage noted from the wound.  All other ulcerations to the right foot are healed at this time.     Left foot-all ulcerations to the left foot are healed there are no open lesions or signs of infection noted.     Musculoskeletal:  Decrease arch height. Lateral deviation of the right and left halluxes.  Significant decreased range of motion of the right first metatarsal phalangeal joint and inner phalangeal joint.     Assessment:       Diabetic polyneuropathy associated with type 2 diabetes mellitus (H)  Diabetic ulcer of other part of right foot associated with type 2 diabetes mellitus, with fat layer exposed (H)  H/O amputation of lesser toe, left (H)  Edema of  both lower extremities due to peripheral venous insufficiency  Hallux rigidus of both feet  Morbid obesity (H)     Medical Decision Making/Plan: At this time the wounds were debrided.  Please see procedure note below.  We will have him continue to apply Iodosorb gel to the right foot ulcer as the primary dressing daily and cover with a secondary dressing of4x4 gauze pads x 2 and 3 inch gauze roll and tape.     Recommend elevating the feet to help with swelling as he notes he cannot wear his compression socks has difficulty putting them on.  He was given Tensogrip today to help with swelling to the foot and leg.  Discussed that the swelling can contribute to the delayed healing of the wounds.  He will continue in the offloading shoes We will have him follow-up in 4 weeks for reassessment of the ulcerations.  All questions were answered to patient satisfaction he will call further questions or concerns.     Procedure: After verbal consent, excisional debridement was performed on ulcers.  #15 blade was used to debride ulcers down to and including subcutaneous tissue. Bleeding controlled with light pressure.   No drainage noted.  No anesthesia was used due to neuropathy. Dry dressing applied to foot.  Patient tolerated procedure well.      Patient Risk Factor:  Patient is a high risk factor for infection.       Ayaka Azevedo DPM, Podiatry/Foot and Ankle Surgery        Again, thank you for allowing me to participate in the care of your patient.        Sincerely,        Ayaka Azevedo DPM, Podiatry/Foot and Ankle Surgery

## 2022-03-02 ENCOUNTER — LAB REQUISITION (OUTPATIENT)
Dept: LAB | Facility: CLINIC | Age: 77
End: 2022-03-02
Payer: COMMERCIAL

## 2022-03-02 DIAGNOSIS — Z20.822 CONTACT WITH AND (SUSPECTED) EXPOSURE TO COVID-19: ICD-10-CM

## 2022-03-02 LAB — SARS-COV-2 RNA RESP QL NAA+PROBE: NEGATIVE

## 2022-03-04 PROCEDURE — U0003 INFECTIOUS AGENT DETECTION BY NUCLEIC ACID (DNA OR RNA); SEVERE ACUTE RESPIRATORY SYNDROME CORONAVIRUS 2 (SARS-COV-2) (CORONAVIRUS DISEASE [COVID-19]), AMPLIFIED PROBE TECHNIQUE, MAKING USE OF HIGH THROUGHPUT TECHNOLOGIES AS DESCRIBED BY CMS-2020-01-R: HCPCS | Mod: ORL | Performed by: NURSE PRACTITIONER

## 2022-03-05 LAB — SARS-COV-2 RNA RESP QL NAA+PROBE: NEGATIVE

## 2022-03-07 ENCOUNTER — LAB REQUISITION (OUTPATIENT)
Dept: LAB | Facility: CLINIC | Age: 77
End: 2022-03-07
Payer: COMMERCIAL

## 2022-03-07 DIAGNOSIS — Z20.822 CONTACT WITH AND (SUSPECTED) EXPOSURE TO COVID-19: ICD-10-CM

## 2022-03-08 PROCEDURE — U0005 INFEC AGEN DETEC AMPLI PROBE: HCPCS | Mod: ORL | Performed by: NURSE PRACTITIONER

## 2022-03-09 ENCOUNTER — LAB REQUISITION (OUTPATIENT)
Dept: LAB | Facility: CLINIC | Age: 77
End: 2022-03-09
Payer: COMMERCIAL

## 2022-03-09 DIAGNOSIS — Z20.822 CONTACT WITH AND (SUSPECTED) EXPOSURE TO COVID-19: ICD-10-CM

## 2022-03-09 LAB — SARS-COV-2 RNA RESP QL NAA+PROBE: NEGATIVE

## 2022-03-11 LAB — SARS-COV-2 RNA RESP QL NAA+PROBE: NEGATIVE

## 2022-03-11 PROCEDURE — U0005 INFEC AGEN DETEC AMPLI PROBE: HCPCS | Mod: ORL | Performed by: NURSE PRACTITIONER

## 2022-03-14 ENCOUNTER — LAB REQUISITION (OUTPATIENT)
Dept: LAB | Facility: CLINIC | Age: 77
End: 2022-03-14
Payer: COMMERCIAL

## 2022-03-14 DIAGNOSIS — Z20.822 CONTACT WITH AND (SUSPECTED) EXPOSURE TO COVID-19: ICD-10-CM

## 2022-03-15 LAB — SARS-COV-2 RNA RESP QL NAA+PROBE: NEGATIVE

## 2022-03-15 PROCEDURE — U0005 INFEC AGEN DETEC AMPLI PROBE: HCPCS | Mod: ORL | Performed by: NURSE PRACTITIONER

## 2022-03-16 NOTE — ED AVS SNAPSHOT
Patient Name: Mg Gerber Sr.  : 1940    MRN: [id number]                              Today's Date: 3/16/2022       Admit Date: 3/14/2022    Visit Dx:     ICD-10-CM ICD-9-CM   1. Generalized weakness  R53.1 780.79   2. Immobility  Z74.09 799.89   3. Closed fracture of multiple ribs of left side, initial encounter  S22.42XA 807.09   4. Leukocytosis, unspecified type  D72.829 288.60     Patient Active Problem List   Diagnosis   • Anemia, vitamin B12 deficiency   • Arthritis   • Hemochromatosis   • Herpes zoster   • Hip pain   • Essential hypertension   • Cancer (HCC)   • Pulmonary emphysema (HCC)   • Left low back pain   • Vertigo   • Headache around the eyes   • Mild cognitive impairment   • Metastatic renal cell carcinoma  on q 2 wks chemo   • Anxiety   • Malignant neoplasm of lung (HCC)   • Neuropathy   • Perennial allergic rhinitis   • Vitamin B12 deficiency   • Osteoarthritis of lumbar spine   • Pharyngitis   • Bronchitis   • Drug-induced constipation   • Hx of hiatal hernia   • History of lung cancer   • COVID-19 virus detected   • Dyspnea   • Generalized weakness   • Acute respiratory failure with hypoxia (HCC)   • Gastroesophageal reflux disease   • Hearing disorder   • Malignant neoplasm of prostate (HCC)   • Sleep apnea   • Anemia   • Malignant neoplasm metastatic to lung (HCC)   • Secondary bacterial pneumonia   • Pneumonia due to COVID-19 virus   • Hilar mass   • On antineoplastic chemotherapy q 2wks   • Paroxysmal atrial fibrillation (HCC)   • History of pulmonary embolism   • Nausea   • Gastritis   • Muscle spasm   • Fall   • Closed fracture of multiple ribs of left side   • Chest wall pain   • Fall, initial encounter   • Immobility syndrome   • Self-care deficit     Past Medical History:   Diagnosis Date   • Allergic    • Anemia, vitamin B12 deficiency 2012   • Anxiety    • Arthritis 2014   • Atrial fibrillation (HCC)    • Cancer (HCC) 2014    kidney; prostate   •  [facility]  [address]  [facility] [address]  Phone:  [phone]  Fax:  [phone]                                    Lance Ibrahim   MRN: [id number]    Department:  [facility]   Date of Visit:  2/23/2019           After Visit Summary Signature Page    I have received my discharge instructions, and my questions have been answered. I have discussed any challenges I see with this plan with the nurse or doctor.    ..........................................................................................................................................  Patient/Patient Representative Signature      ..........................................................................................................................................  Patient Representative Print Name and Relationship to Patient    ..................................................               ................................................  Date                                   Time    ..........................................................................................................................................  Reviewed by Signature/Title    ...................................................              ..............................................  Date                                               Time          22EPIC Rev 08/18        Cataract    • Chemotherapeutic agent or infusion extravasation     q 1-2 wks    • COPD (chronic obstructive pulmonary disease) (HCC) 03/24/2014   • Emphysema of lung (HCC)    • Hemochromatosis 03/24/2014   • Herpes zoster 12/06/2011    left medial thigh   • Hip pain 03/24/2014   • History of Doppler ultrasound 12/28/2011    superficial and deep venous insuffiency   • History of EKG 02/10/2012    Dr. Kathi Lloyd; unchanged from prior EKG   • Hypertension     benign essential   • Injury of back    • Pulmonary embolism (HCC) 03/24/2014   • Renal cell cancer (HCC)    • Shortness of breath    • Sleep apnea      Past Surgical History:   Procedure Laterality Date   • APPENDECTOMY     • CATARACT EXTRACTION     • CHOLECYSTECTOMY     • SPLENECTOMY     • TONSILLECTOMY     • VEIN SURGERY        General Information     Row Name 03/16/22 1457          OT Time and Intention    Document Type therapy note (daily note)  -     Mode of Treatment individual therapy;occupational therapy  -     Row Name 03/16/22 1457          General Information    Patient Profile Reviewed yes  -BL     Existing Precautions/Restrictions fall;oxygen therapy device and L/min  -BL     Row Name 03/16/22 1457          Cognition    Orientation Status (Cognition) oriented x 3  -BL     Row Name 03/16/22 1457          Safety Issues, Functional Mobility    Safety Issues Affecting Function (Mobility) insight into deficits/self-awareness;judgment;awareness of need for assistance;safety precaution awareness  -     Impairments Affecting Function (Mobility) balance;endurance/activity tolerance;strength;pain  -BL           User Key  (r) = Recorded By, (t) = Taken By, (c) = Cosigned By    Initials Name Provider Type    BL Aylin Dowell OT Occupational Therapist                 Mobility/ADL's     Row Name 03/16/22 1457          Bed Mobility    Bed Mobility supine-sit  -BL     Supine-Sit [place] (Bed Mobility) contact guard  -BL     Assistive Device (Bed  Mobility) bed rails;head of bed elevated  -BL     Comment, (Bed Mobility) Pt with noted improvement with sup>sit transition only requiring CGA to sit EOB.  -     Row Name 03/16/22 1457          Transfers    Transfers bed-chair transfer  -     Bed-Chair [place] (Transfers) minimum assist (75% patient effort)  -     Assistive Device (Bed-Chair Transfers) walker, front-wheeled  -BL     Sit-Stand [place] (Transfers) minimum assist (75% patient effort);verbal cues  -     Stand-Sit [place] (Transfers) minimum assist (75% patient effort);verbal cues  -     Row Name 03/16/22 1457          Sit-Stand Transfer    Assistive Device (Sit-Stand Transfers) walker, front-wheeled  -BL     Row Name 03/16/22 1457          Activities of Daily Living    BADL Assessment/Intervention grooming  -     Row Name 03/16/22 1457          Stand-Sit Transfer    Assistive Device (Stand-Sit Transfers) walker, front-wheeled  -BL     Row Name 03/16/22 1457          Grooming Assessment/Training    [place] Level (Grooming) wash face, hands;oral care regimen;set up  -BL     Position (Grooming) supported sitting  -           User Key  (r) = Recorded By, (t) = Taken By, (c) = Cosigned By    Initials Name Provider Type     Aylin Dowell OT Occupational Therapist               Obj/Interventions     Row Name 03/16/22 1459          Shoulder (Therapeutic Exercise)    Shoulder (Therapeutic Exercise) AROM (active range of motion)  -     Shoulder AROM (Therapeutic Exercise) bilateral;flexion;extension;aBduction;aDduction;20 repititions;sitting  -     Row Name 03/16/22 1459          Elbow/Forearm (Therapeutic Exercise)    Elbow/Forearm (Therapeutic Exercise) AROM (active range of motion)  -     Elbow/Forearm AROM (Therapeutic Exercise) bilateral;flexion;extension;supination;pronation;20 repititions;sitting  -     Row Name 03/16/22 1459          Hand (Therapeutic Exercise)    Hand (Therapeutic Exercise) AROM (active  range of motion)  -     Hand AROM/AAROM (Therapeutic Exercise) bilateral;AROM (active range of motion);finger extension;finger aBduction;finger aDduction;finger flexion;20 repititions  -     Row Name 03/16/22 1459          Motor Skills    Therapeutic Exercise shoulder;elbow/forearm;hand  -     Row Name 03/16/22 1459          Balance    Balance Assessment sitting static balance;sit to stand dynamic balance;standing static balance;standing dynamic balance  -     Static Sitting Balance supervision  -     Position, Sitting Balance unsupported;sitting edge of bed  -     Sit to Stand Dynamic Balance minimal assist  -     Static Standing Balance minimal assist;verbal cues  -     Dynamic Standing Balance minimal assist;verbal cues  -BL     Position/Device Used, Standing Balance supported;walker, front-wheeled  -     Balance Interventions sitting;standing;sit to stand;supported;static;occupation based/functional task;dynamic  -           User Key  (r) = Recorded By, (t) = Taken By, (c) = Cosigned By    Initials Name Provider Type     Aylin Dowell OT Occupational Therapist               Goals/Plan    No documentation.                Clinical Impression     Row Name 03/16/22 1500          Pain Assessment    Pretreatment Pain Rating 6/10  -BL     Posttreatment Pain Rating 6/10  -BL     Pain Location - Side/Orientation Left  -BL     Pre/Posttreatment Pain Comment ribs  -BL     Pain Intervention(s) Repositioned  -     Row Name 03/16/22 1500          Plan of Care Review    Plan of Care Reviewed With patient;spouse  -     Progress improving  -BL     Outcome Evaluation Pt seen by OT this date for treatment. Upon arrival pt lying supine in bed, spouse present at bedside, pt A&O x3, 6/10 pain in L side ribs. Pt with noted improvement with sup>sit transition requiring CGA for transition to sit EOB. Once sitting EOB pt performed sit>stand with improvement only requiring Min A and verbal cues with use of  rolling walker to transfer from bed>chair. Once in chair pt completed grooming task with S/U. Pt completed BUE AROM x20 reps sitting up in chair to increase endurance and improve ROM to complete functional task. Pt left sitting up in chair, needs in reach, no alarm, RN aware. Pt making progress with functional mobility and endurance. OT wore mask, gloves, glasses, hand hygiene performed, appropriate ppe worn during encounter.  -BL     Row Name 03/16/22 1500          Vital Signs    Pre Patient Position Supine  -BL     Intra Patient Position Standing  -BL     Post Patient Position Sitting  -BL     Row Name 03/16/22 1500          Positioning and Restraints    Pre-Treatment Position in bed  -BL     Post Treatment Position chair  -BL     In Chair sitting;call light within reach;encouraged to call for assist;with family/caregiver  -           User Key  (r) = Recorded By, (t) = Taken By, (c) = Cosigned By    Initials Name Provider Type    Aylin Solares OT Occupational Therapist               Outcome Measures    No documentation.                 Occupational Therapy Education                 Title: PT OT SLP Therapies (In Progress)     Topic: Occupational Therapy (In Progress)     Point: ADL training (Done)     Description:   Instruct learner(s) on proper safety adaptation and remediation techniques during self care or transfers.   Instruct in proper use of assistive devices.              Learning Progress Summary           Patient Acceptance, E, VU by  at 3/15/2022 1150    Comment: the role of OT                   Point: Home exercise program (Not Started)     Description:   Instruct learner(s) on appropriate technique for monitoring, assisting and/or progressing therapeutic exercises/activities.              Learner Progress:  Not documented in this visit.          Point: Precautions (Not Started)     Description:   Instruct learner(s) on prescribed precautions during self-care and functional transfers.               Learner Progress:  Not documented in this visit.          Point: Body mechanics (Not Started)     Description:   Instruct learner(s) on proper positioning and spine alignment during self-care, functional mobility activities and/or exercises.              Learner Progress:  Not documented in this visit.                      User Key     Initials Effective Dates Name Provider Type Discipline     01/05/21 -  Aylin Dowell OT Occupational Therapist OT              OT Recommendation and Plan  Planned Therapy Interventions (OT): BADL retraining, IADL retraining, occupation/activity based interventions, patient/caregiver education/training, transfer/mobility retraining, activity tolerance training  Therapy Frequency (OT): 5 times/wk  Plan of Care Review  Plan of Care Reviewed With: patient, spouse  Progress: improving  Outcome Evaluation: Pt seen by OT this date for treatment. Upon arrival pt lying supine in bed, spouse present at bedside, pt A&O x3, 6/10 pain in L side ribs. Pt with noted improvement with sup>sit transition requiring CGA for transition to sit EOB. Once sitting EOB pt performed sit>stand with improvement only requiring Min A and verbal cues with use of rolling walker to transfer from bed>chair. Once in chair pt completed grooming task with S/U. Pt completed BUE AROM x20 reps sitting up in chair to increase endurance and improve ROM to complete functional task. Pt left sitting up in chair, needs in reach, no alarm, RN aware. Pt making progress with functional mobility and endurance. OT wore mask, gloves, glasses, hand hygiene performed, appropriate ppe worn during encounter.     Time Calculation:    Time Calculation- OT     Row Name 03/16/22 1504             Time Calculation- OT    OT Start Time 1326  -BL      OT Stop Time 1359  -BL      OT Time Calculation (min) 33 min  -BL      Total Timed Code Minutes- OT 33 minute(s)  -BL      OT Received On 03/16/22  -BL      OT - Next Appointment 03/17/22   -BL              Timed Charges    54523 - OT Therapeutic Exercise Minutes 18  -BL      21078 - OT Self Care/Mgmt Minutes 15  -BL              Total Minutes    Timed Charges Total Minutes 33  -BL       Total Minutes 33  -BL            User Key  (r) = Recorded By, (t) = Taken By, (c) = Cosigned By    Initials Name Provider Type     Aylin Dowell OT Occupational Therapist              Therapy Charges for Today     Code Description Service Date Service Provider Modifiers Qty    80219576532 HC OT THERAPEUTIC ACT EA 15 MIN 3/15/2022 Aylin Dowell OT GO 1    48089164100 HC OT SELF CARE/MGMT/TRAIN EA 15 MIN 3/15/2022 Aylin Dowell OT GO 1    30166295038 HC OT EVAL MOD COMPLEXITY 2 3/15/2022 Aylin Dowell OT GO 1    46349107758 HC OT THER PROC EA 15 MIN 3/16/2022 Aylin Dowell OT GO 1    29741462531 HC OT SELF CARE/MGMT/TRAIN EA 15 MIN 3/16/2022 Aylin Dowell OT GO 1               Aylin Dowell OT  3/16/2022

## 2022-03-17 ENCOUNTER — LAB REQUISITION (OUTPATIENT)
Dept: LAB | Facility: CLINIC | Age: 77
End: 2022-03-17
Payer: COMMERCIAL

## 2022-03-17 DIAGNOSIS — Z20.822 CONTACT WITH AND (SUSPECTED) EXPOSURE TO COVID-19: ICD-10-CM

## 2022-03-18 LAB — SARS-COV-2 RNA RESP QL NAA+PROBE: NEGATIVE

## 2022-03-18 PROCEDURE — U0003 INFECTIOUS AGENT DETECTION BY NUCLEIC ACID (DNA OR RNA); SEVERE ACUTE RESPIRATORY SYNDROME CORONAVIRUS 2 (SARS-COV-2) (CORONAVIRUS DISEASE [COVID-19]), AMPLIFIED PROBE TECHNIQUE, MAKING USE OF HIGH THROUGHPUT TECHNOLOGIES AS DESCRIBED BY CMS-2020-01-R: HCPCS | Mod: ORL | Performed by: NURSE PRACTITIONER

## 2022-03-21 ENCOUNTER — LAB REQUISITION (OUTPATIENT)
Dept: LAB | Facility: CLINIC | Age: 77
End: 2022-03-21
Payer: COMMERCIAL

## 2022-03-21 DIAGNOSIS — Z20.822 CONTACT WITH AND (SUSPECTED) EXPOSURE TO COVID-19: ICD-10-CM

## 2022-03-22 PROCEDURE — U0005 INFEC AGEN DETEC AMPLI PROBE: HCPCS | Mod: ORL | Performed by: NURSE PRACTITIONER

## 2022-03-23 LAB — SARS-COV-2 RNA RESP QL NAA+PROBE: NEGATIVE

## 2022-03-28 DIAGNOSIS — I10 ESSENTIAL HYPERTENSION: ICD-10-CM

## 2022-03-29 DIAGNOSIS — I10 ESSENTIAL HYPERTENSION: ICD-10-CM

## 2022-03-29 RX ORDER — TORSEMIDE 20 MG/1
20 TABLET ORAL DAILY
Qty: 90 TABLET | Refills: 2 | Status: SHIPPED | OUTPATIENT
Start: 2022-03-29 | End: 2022-12-21

## 2022-03-29 RX ORDER — TORSEMIDE 20 MG/1
20 TABLET ORAL DAILY
Qty: 90 TABLET | Refills: 2 | OUTPATIENT
Start: 2022-03-29

## 2022-03-29 NOTE — TELEPHONE ENCOUNTER
Pending Prescriptions:                       Disp   Refills    torsemide (DEMADEX) 20 MG tablet           90 tab*2        Sig: Take 1 tablet (20 mg) by mouth daily    Routing refill request to provider for review/approval because:  Labs out of range:  Creatinine    BP Readings from Last 3 Encounters:   03/01/22 (!) 164/78   01/26/22 134/80   01/17/22 (!) 153/89       Tiffanie DIA RN   [facility]

## 2022-03-29 NOTE — TELEPHONE ENCOUNTER
Refused Prescriptions:                       Disp   Refills    torsemide (DEMADEX) 20 MG tablet           90 tab*2        Sig: Take 1 tablet (20 mg) by mouth daily    90 tablets with 2 refills was sent today, 3/29/2022      Tiffanie DIA RN   [facility]

## 2022-05-16 ENCOUNTER — TRANSFERRED RECORDS (OUTPATIENT)
Dept: HEALTH INFORMATION MANAGEMENT | Facility: CLINIC | Age: 77
End: 2022-05-16
Payer: COMMERCIAL

## 2022-06-01 ENCOUNTER — OFFICE VISIT (OUTPATIENT)
Dept: PODIATRY | Facility: CLINIC | Age: 77
End: 2022-06-01
Payer: COMMERCIAL

## 2022-06-01 VITALS — DIASTOLIC BLOOD PRESSURE: 78 MMHG | SYSTOLIC BLOOD PRESSURE: 128 MMHG

## 2022-06-01 DIAGNOSIS — L97.512 CHRONIC ULCER OF GREAT TOE OF RIGHT FOOT WITH FAT LAYER EXPOSED (H): ICD-10-CM

## 2022-06-01 DIAGNOSIS — I73.9 PVD (PERIPHERAL VASCULAR DISEASE) (H): ICD-10-CM

## 2022-06-01 DIAGNOSIS — M20.5X2 HALLUX LIMITUS OF LEFT FOOT: ICD-10-CM

## 2022-06-01 DIAGNOSIS — E11.42 DIABETIC POLYNEUROPATHY ASSOCIATED WITH TYPE 2 DIABETES MELLITUS (H): Primary | ICD-10-CM

## 2022-06-01 PROCEDURE — 99214 OFFICE O/P EST MOD 30 MIN: CPT | Mod: 25 | Performed by: PODIATRIST

## 2022-06-01 PROCEDURE — 11042 DBRDMT SUBQ TIS 1ST 20SQCM/<: CPT | Performed by: PODIATRIST

## 2022-06-01 NOTE — LETTER
6/1/2022         RE: Lance Ibrahim  [address] [place] [id number]        Dear Colleague,    Thank you for referring your patient, Lance Ibrahim, to the [facility]. Please see a copy of my visit note below.    Podiatry / Foot and Ankle Surgery Progress Note    June 1, 2022    Subject: Patient was seen for reassessment of chronic right foot ulcer.  Notes that he has been doing silvadene cream to the ulcer.      Currently he is in regular shoes.  Notes he has not been wearing his offloading shoes.  His foot slides in it and it does not support ankle. No pain but does have baseline neuropathy. Denies fever, nausea, vomiting    Objective:  Vitals: /78     A1C: 7.5 (11/2021)     General:  Patient is alert and orientated.  NAD.     Vascular:  DP and PT pulses are palpable.  significant Edema and varicosities noted.  CFT's < 3secs.  Skin temp is normal      Neuro:  Light and gross touch sensation is absent to feet.      Derm: Right foot-full thickness ulcer to the plantar aspect of the right IPJ  measuring 1.5cm x 0.6cm x 0.3cm after debridement.   Bases of the wounds are granular.  No purulent drainage, redness or signs of acute infection noted.  There is heavy copious amounts of clear serous drainage noted from the wound.  All other ulcerations to the right foot are healed at this time.     Left foot-all ulcerations to the left foot are healed there are no open lesions or signs of infection noted.     Musculoskeletal:  Decrease arch height. Lateral deviation of the right and left halluxes.  Significant decreased range of motion of the right first metatarsal phalangeal joint and inner phalangeal joint.     Assessment:       Diabetic polyneuropathy associated with type 2 diabetes mellitus (H)  PVD (peripheral vascular disease) (H)  Chronic ulcer of great toe of right foot with fat layer exposed (H)  Hallux limitus of left foot     Medical Decision Making/Plan: At this time  the wounds were debrided.  Please see procedure note below.  Discussed that I recommended MRI at this time as the wound is larger than next visit.  We will have him switch to iodine dressing changes daily at this time however will order Hydrofera Blue that we will have him apply daily once he gets this with a large Band-Aid.  We talked about offloading the foot and that this is part of the reason its not.  I offered a knee roller, wheelchair, tall cast boot, crutches.  He does not want any of those.  We will call him with the MRI results.  If no infection we may be able to go in and remove some bone to this area to try to help with.   All questions were answered to patient satisfaction he will call further questions or concerns.     Procedure: After verbal consent, excisional debridement was performed on ulcers.  #15 blade was used to debride ulcers down to and including subcutaneous tissue. Bleeding controlled with light pressure.   No drainage noted.  No anesthesia was used due to neuropathy. Dry dressing applied to foot.  Patient tolerated procedure well.      Patient Risk Factor:  Patient is a high risk factor for infection.        Ayaka Azevedo DPM, Podiatry/Foot and Ankle Surgery        Again, thank you for allowing me to participate in the care of your patient.        Sincerely,        Ayaka Azevedo DPM, Podiatry/Foot and Ankle Surgery

## 2022-06-01 NOTE — PATIENT INSTRUCTIONS
Thank you for choosing [facility] Podiat[facility] / Foot & Ankle Surgery!    DR RANDHAWA'S CLINIC:  [facility]   [address]   [place] [address]      TRIAGE LINE: [phone]  APPOINTMENTS: [phone]  RADIOLOGY: [phone]  SET UP SURGERY: [phone]  FAX NUMBER: [phone]  BILLING QUESTIONS: [phone]       Follow up: 4 week      Please call to schedule your MRI/CT/Ultrasound/Arthrogram appointment.  The number is [phone].    For EMG (Motor Nerve Testing):  Please call [facility]:  [phone], option #2

## 2022-06-01 NOTE — PROGRESS NOTES
Podiatry / Foot and Ankle Surgery Progress Note    June 1, 2022    Subject: Patient was seen for reassessment of chronic right foot ulcer.  Notes that he has been doing silvadene cream to the ulcer.      Currently he is in regular shoes.  Notes he has not been wearing his offloading shoes.  His foot slides in it and it does not support ankle. No pain but does have baseline neuropathy. Denies fever, nausea, vomiting    Objective:  Vitals: /78     A1C: 7.5 (11/2021)     General:  Patient is alert and orientated.  NAD.     Vascular:  DP and PT pulses are palpable.  significant Edema and varicosities noted.  CFT's < 3secs.  Skin temp is normal      Neuro:  Light and gross touch sensation is absent to feet.      Derm: Right foot-full thickness ulcer to the plantar aspect of the right IPJ  measuring 1.5cm x 0.6cm x 0.3cm after debridement.   Bases of the wounds are granular.  No purulent drainage, redness or signs of acute infection noted.  There is heavy copious amounts of clear serous drainage noted from the wound.  All other ulcerations to the right foot are healed at this time.     Left foot-all ulcerations to the left foot are healed there are no open lesions or signs of infection noted.     Musculoskeletal:  Decrease arch height. Lateral deviation of the right and left halluxes.  Significant decreased range of motion of the right first metatarsal phalangeal joint and inner phalangeal joint.     Assessment:       Diabetic polyneuropathy associated with type 2 diabetes mellitus (H)  PVD (peripheral vascular disease) (H)  Chronic ulcer of great toe of right foot with fat layer exposed (H)  Hallux limitus of left foot     Medical Decision Making/Plan: At this time the wounds were debrided.  Please see procedure note below.  Discussed that I recommended MRI at this time as the wound is larger than next visit.  We will have him switch to iodine dressing changes daily at this time however will order Hydrofera Blue  that we will have him apply daily once he gets this with a large Band-Aid.  We talked about offloading the foot and that this is part of the reason its not.  I offered a knee roller, wheelchair, tall cast boot, crutches.  He does not want any of those.  We will call him with the MRI results.  If no infection we may be able to go in and remove some bone to this area to try to help with.   All questions were answered to patient satisfaction he will call further questions or concerns.     Procedure: After verbal consent, excisional debridement was performed on ulcers.  #15 blade was used to debride ulcers down to and including subcutaneous tissue. Bleeding controlled with light pressure.   No drainage noted.  No anesthesia was used due to neuropathy. Dry dressing applied to foot.  Patient tolerated procedure well.      Patient Risk Factor:  Patient is a high risk factor for infection.        Ayaka Azevedo DPM, Podiatry/Foot and Ankle Surgery

## 2022-06-05 ENCOUNTER — HEALTH MAINTENANCE LETTER (OUTPATIENT)
Age: 77
End: 2022-06-05

## 2022-06-09 ASSESSMENT — ENCOUNTER SYMPTOMS
CHILLS: 0
HEARTBURN: 0
PARESTHESIAS: 0
NAUSEA: 0
FEVER: 0
COUGH: 0
NERVOUS/ANXIOUS: 0
MYALGIAS: 1
SHORTNESS OF BREATH: 0
HEMATURIA: 0
HEMATOCHEZIA: 0
CONSTIPATION: 0
ARTHRALGIAS: 1
WEAKNESS: 0
DYSURIA: 0
ABDOMINAL PAIN: 0
JOINT SWELLING: 0
PALPITATIONS: 0
DIZZINESS: 0
SORE THROAT: 0
DIARRHEA: 0
EYE PAIN: 0
HEADACHES: 0
FREQUENCY: 1

## 2022-06-09 ASSESSMENT — ACTIVITIES OF DAILY LIVING (ADL): CURRENT_FUNCTION: NO ASSISTANCE NEEDED

## 2022-06-09 ASSESSMENT — PATIENT HEALTH QUESTIONNAIRE - PHQ9
SUM OF ALL RESPONSES TO PHQ QUESTIONS 1-9: 3
SUM OF ALL RESPONSES TO PHQ QUESTIONS 1-9: 3
10. IF YOU CHECKED OFF ANY PROBLEMS, HOW DIFFICULT HAVE THESE PROBLEMS MADE IT FOR YOU TO DO YOUR WORK, TAKE CARE OF THINGS AT HOME, OR GET ALONG WITH OTHER PEOPLE: NOT DIFFICULT AT ALL

## 2022-06-16 ENCOUNTER — OFFICE VISIT (OUTPATIENT)
Dept: FAMILY MEDICINE | Facility: CLINIC | Age: 77
End: 2022-06-16
Payer: COMMERCIAL

## 2022-06-16 VITALS
SYSTOLIC BLOOD PRESSURE: 154 MMHG | HEART RATE: 91 BPM | HEIGHT: 71 IN | TEMPERATURE: 98.3 F | BODY MASS INDEX: 34.19 KG/M2 | OXYGEN SATURATION: 98 % | WEIGHT: 244.2 LBS | DIASTOLIC BLOOD PRESSURE: 68 MMHG | RESPIRATION RATE: 18 BRPM

## 2022-06-16 DIAGNOSIS — N18.30 STAGE 3 CHRONIC KIDNEY DISEASE, UNSPECIFIED WHETHER STAGE 3A OR 3B CKD (H): ICD-10-CM

## 2022-06-16 DIAGNOSIS — L97.509 TYPE 2 DIABETES MELLITUS WITH FOOT ULCER, WITHOUT LONG-TERM CURRENT USE OF INSULIN (H): ICD-10-CM

## 2022-06-16 DIAGNOSIS — I50.32 CHRONIC DIASTOLIC HEART FAILURE (H): ICD-10-CM

## 2022-06-16 DIAGNOSIS — I25.10 CORONARY ARTERY DISEASE INVOLVING NATIVE CORONARY ARTERY OF NATIVE HEART WITHOUT ANGINA PECTORIS: ICD-10-CM

## 2022-06-16 DIAGNOSIS — I10 ESSENTIAL HYPERTENSION: ICD-10-CM

## 2022-06-16 DIAGNOSIS — Z00.00 ENCOUNTER FOR MEDICARE ANNUAL WELLNESS EXAM: Primary | ICD-10-CM

## 2022-06-16 DIAGNOSIS — M10.9 GOUTY ARTHROPATHY: ICD-10-CM

## 2022-06-16 DIAGNOSIS — N40.0 PROSTATISM: ICD-10-CM

## 2022-06-16 DIAGNOSIS — J31.0 CHRONIC RHINITIS: ICD-10-CM

## 2022-06-16 DIAGNOSIS — E11.621 TYPE 2 DIABETES MELLITUS WITH FOOT ULCER, WITHOUT LONG-TERM CURRENT USE OF INSULIN (H): ICD-10-CM

## 2022-06-16 PROBLEM — L97.501: Status: ACTIVE | Noted: 2021-07-28

## 2022-06-16 PROBLEM — R21 SKIN RASH: Status: ACTIVE | Noted: 2018-12-08

## 2022-06-16 LAB
ALBUMIN SERPL-MCNC: 3.6 G/DL (ref 3.5–5)
ALP SERPL-CCNC: 136 U/L (ref 45–120)
ALT SERPL W P-5'-P-CCNC: 16 U/L (ref 0–45)
ANION GAP SERPL CALCULATED.3IONS-SCNC: 13 MMOL/L (ref 5–18)
AST SERPL W P-5'-P-CCNC: 13 U/L (ref 0–40)
BILIRUB SERPL-MCNC: 0.3 MG/DL (ref 0–1)
BUN SERPL-MCNC: 20 MG/DL (ref 8–28)
CALCIUM SERPL-MCNC: 9.4 MG/DL (ref 8.5–10.5)
CHLORIDE BLD-SCNC: 104 MMOL/L (ref 98–107)
CHOLEST SERPL-MCNC: 144 MG/DL
CO2 SERPL-SCNC: 23 MMOL/L (ref 22–31)
CREAT SERPL-MCNC: 1.25 MG/DL (ref 0.7–1.3)
ERYTHROCYTE [DISTWIDTH] IN BLOOD BY AUTOMATED COUNT: 17.9 % (ref 10–15)
FASTING STATUS PATIENT QL REPORTED: YES
GFR SERPL CREATININE-BSD FRML MDRD: 59 ML/MIN/1.73M2
GLUCOSE BLD-MCNC: 159 MG/DL (ref 70–125)
HBA1C MFR BLD: 8.6 % (ref 0–5.6)
HCT VFR BLD AUTO: 38.6 % (ref 40–53)
HDLC SERPL-MCNC: 63 MG/DL
HGB BLD-MCNC: 12.2 G/DL (ref 13.3–17.7)
LDLC SERPL CALC-MCNC: 67 MG/DL
MCH RBC QN AUTO: 28.2 PG (ref 26.5–33)
MCHC RBC AUTO-ENTMCNC: 31.6 G/DL (ref 31.5–36.5)
MCV RBC AUTO: 89 FL (ref 78–100)
PLATELET # BLD AUTO: 254 10E3/UL (ref 150–450)
POTASSIUM BLD-SCNC: 4.4 MMOL/L (ref 3.5–5)
PROT SERPL-MCNC: 7.5 G/DL (ref 6–8)
RBC # BLD AUTO: 4.32 10E6/UL (ref 4.4–5.9)
SODIUM SERPL-SCNC: 140 MMOL/L (ref 136–145)
TRIGL SERPL-MCNC: 69 MG/DL
WBC # BLD AUTO: 8.7 10E3/UL (ref 4–11)

## 2022-06-16 PROCEDURE — 99214 OFFICE O/P EST MOD 30 MIN: CPT | Mod: 25 | Performed by: FAMILY MEDICINE

## 2022-06-16 PROCEDURE — 99397 PER PM REEVAL EST PAT 65+ YR: CPT | Performed by: FAMILY MEDICINE

## 2022-06-16 PROCEDURE — 80053 COMPREHEN METABOLIC PANEL: CPT | Performed by: FAMILY MEDICINE

## 2022-06-16 PROCEDURE — 36415 COLL VENOUS BLD VENIPUNCTURE: CPT | Performed by: FAMILY MEDICINE

## 2022-06-16 PROCEDURE — 83036 HEMOGLOBIN GLYCOSYLATED A1C: CPT | Performed by: FAMILY MEDICINE

## 2022-06-16 PROCEDURE — 80061 LIPID PANEL: CPT | Performed by: FAMILY MEDICINE

## 2022-06-16 PROCEDURE — 85027 COMPLETE CBC AUTOMATED: CPT | Performed by: FAMILY MEDICINE

## 2022-06-16 RX ORDER — IBUPROFEN 200 MG
200 TABLET ORAL EVERY 6 HOURS PRN
Status: ON HOLD | COMMUNITY
End: 2022-08-29

## 2022-06-16 RX ORDER — METHOCARBAMOL 500 MG/1
TABLET, FILM COATED ORAL
COMMUNITY
Start: 2022-06-10 | End: 2022-08-25

## 2022-06-16 RX ORDER — LANCETS
EACH MISCELLANEOUS
COMMUNITY
Start: 2022-02-02 | End: 2022-08-25

## 2022-06-16 RX ORDER — LABETALOL 200 MG/1
200 TABLET, FILM COATED ORAL 2 TIMES DAILY
COMMUNITY
End: 2022-10-04

## 2022-06-16 RX ORDER — LISINOPRIL 10 MG/1
10 TABLET ORAL EVERY 24 HOURS
COMMUNITY
End: 2022-10-04

## 2022-06-16 RX ORDER — ATORVASTATIN CALCIUM 40 MG/1
40 TABLET, FILM COATED ORAL EVERY 24 HOURS
COMMUNITY
End: 2022-10-05

## 2022-06-16 RX ORDER — LORATADINE 10 MG/1
10 TABLET ORAL DAILY PRN
COMMUNITY
End: 2024-06-26

## 2022-06-16 RX ORDER — CELECOXIB 200 MG/1
CAPSULE ORAL
COMMUNITY
Start: 2022-06-10 | End: 2022-09-29

## 2022-06-16 RX ORDER — CHLORHEXIDINE GLUCONATE ORAL RINSE 1.2 MG/ML
SOLUTION DENTAL
COMMUNITY
Start: 2022-03-04 | End: 2022-07-13

## 2022-06-16 RX ORDER — FINASTERIDE 5 MG/1
TABLET, FILM COATED ORAL EVERY 24 HOURS
COMMUNITY
End: 2024-03-06

## 2022-06-16 RX ORDER — MULTIPLE VITAMINS W/ MINERALS TAB 9MG-400MCG
1 TAB ORAL DAILY
Status: ON HOLD | COMMUNITY
End: 2024-06-19

## 2022-06-16 RX ORDER — FLUTICASONE PROPIONATE 50 MCG
1-2 SPRAY, SUSPENSION (ML) NASAL DAILY PRN
COMMUNITY

## 2022-06-16 RX ORDER — AMLODIPINE BESYLATE 5 MG/1
TABLET ORAL EVERY 24 HOURS
COMMUNITY
End: 2022-12-21

## 2022-06-16 ASSESSMENT — PAIN SCALES - GENERAL: PAINLEVEL: MODERATE PAIN (4)

## 2022-06-16 NOTE — PROGRESS NOTES
SUBJECTIVE:   Lance Ibrahim is a 77 year old male who presents for Preventive Visit.  Chief complaint: I am here for my annual wellness exam and I need a new provider as they have moved to the area  \Chief complaint  Patient has been advised of split billing requirements and indicates understanding: Yes  Are you in the first 12 months of your Medicare Part B coverage?  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  His wife passed away unfortunately 1 year ago due to cancer.  Patient is active in his [employer] and keeps up with his financial situation.  Past medical history significant for type 2 diabetes well controlled with metformin does have benign essential hypertension and chronic diastolic heart failure managed with appropriate cardiovascular medications per consultants medications include amlodipine baby aspirin atorvastatin metformin allopurinol for gout; Celebrex for arthritis finasteride might for prostatism.  He also sees urology on an annual basis for the prostatism.  Today we will defer the prostate examination to our consultants.  He is doing very well walks with a cane drives and can perform all acts of daily living without any discomfort.  Currently is not short of breath or dyspnea we did do a medication review today.  Plan is to do comprehensive profile hemogram and A1c.  We will be happy to take over his care here at [facility].  All medical questions asked were answered I personally reviewed family social history surgeries allergies problems list.  This patient is a very very delightful and an excellent historian and we welcome come to our practice    Answers for HPI/ROS submitted by the patient on 6/9/2022  If you checked off any problems, how difficult have these problems made it for you to do your work, take care of things at home, or get along with other people?: Not difficult at all  PHQ9  "TOTAL SCORE: 3  In general, how would you rate your overall physical health?: good  Frequency of exercise:: None  Do you usually eat at least 4 servings of fruit and vegetables a day, include whole grains & fiber, and avoid regularly eating high fat or \"junk\" foods? : No  Taking medications regularly:: Yes  Medication side effects:: Other  Activities of Daily Living: no assistance needed  Home safety: no safety concerns identified  Hearing Impairment:: difficulty understanding soft or whispered speech  In the past 6 months, have you been bothered by leaking of urine?: No  abdominal pain: No  Blood in stool: No  Blood in urine: No  chest pain: No  chills: No  congestion: Yes  constipation: No  cough: No  diarrhea: No  dizziness: No  ear pain: No  eye pain: No  nervous/anxious: No  fever: No  frequency: Yes  genital sores: No  headaches: No  hearing loss: No  heartburn: No  arthralgias: Yes  joint swelling: No  peripheral edema: Yes  mood changes: No  myalgias: Yes  nausea: No  dysuria: No  palpitations: No  Skin sensation changes: No  sore throat: No  urgency: No  rash: No  shortness of breath: No  visual disturbance: No  weakness: No  impotence: Yes  penile discharge: No  In general, how would you rate your overall mental or emotional health?: excellent  Additional concerns today:: Yes        Mental Health:    In general, how would you rate your overall mental or emotional health? excellent  PHQ-2 Score: (P) 0    Do you feel safe in your environment? Yes    Have you ever done Advance Care Planning? (For example, a Health Directive, POLST, or a discussion with a medical provider or your loved ones about your wishes): Yes, advance care planning is on file.    Additional concerns to address?  No    Fall risk:  Fallen 2 or more times in the past year?: No  Any fall with injury in the past year?: No     Cognitive Screenin) Repeat 3 items (Leader, Season, Table)    2) Clock draw: NORMAL  3) 3 item recall: Recalls 3 " objects  Results: 3 items recalled: COGNITIVE IMPAIRMENT LESS LIKELY    Mini-CogTM Copyright MALINA Lee. Licensed by the author for use in [facility]; reprinted with permission (margarita@.[email or web address]). All rights reserved.      Do you have sleep apnea, excessive snoring or daytime drowsiness?: yes - apnea due to enlarged uvula            Reviewed and updated as needed this visit by clinical staff                    Reviewed and updated as needed this visit by Provider                   Social History     Tobacco Use     Smoking status: Former Smoker     Packs/day: 0.00     Quit date: 3/17/1993     Years since quittin.2     Smokeless tobacco: Never Used   Substance Use Topics     Alcohol use: Not Currently                           Current providers sharing in care for this patient include:   Patient Care Team:  Anh Spivey NP as PCP - General (Nurse Practitioner - Adult Health)  Anh Spivey NP as Assigned PCP  Ayaka Azevedo DPM, Podiatry/Foot and Ankle Surgery as Assigned Musculoskeletal Provider  Lars Martines MD as Assigned Heart and Vascular Provider    The following health maintenance items are reviewed in Epic and correct as of today:  Health Maintenance   Topic Date Due     URINE DRUG SCREEN  Never done     ANNUAL REVIEW OF HM ORDERS  Never done     HEPATITIS C SCREENING  Never done     ZOSTER IMMUNIZATION (1 of 2) Never done     EYE EXAM  2020     DIABETIC FOOT EXAM  01/15/2021     A1C  2022     BMP  2022     LIPID  2022     MICROALBUMIN  2022     PHQ-9  2022     HEMOGLOBIN  2022     DTAP/TDAP/TD IMMUNIZATION (3 - Td or Tdap) 2023     MEDICARE ANNUAL WELLNESS VISIT  2023     FALL RISK ASSESSMENT  2023     ADVANCE CARE PLANNING  2026     INFLUENZA VACCINE  Completed     Pneumococcal Vaccine: 65+ Years  Completed     URINALYSIS  Completed     COVID-19 Vaccine  Completed     IPV IMMUNIZATION  Aged Out      MENINGITIS IMMUNIZATION  Aged Out     Lab work is in process      ROS:  Constitutional, HEENT, cardiovascular, pulmonary, GI, , musculoskeletal, neuro, skin, endocrine and psych systems are negative, except as otherwise noted.    OBJECTIVE:   There were no vitals taken for this visit. Estimated body mass index is 18.72 kg/m  as calculated from the following:    Height as of 3/1/22: 1.829 m (6').    Weight as of 3/1/22: 62.6 kg (138 lb).  EXAM:   GENERAL: healthy, alert and no distress  EYES: Eyes grossly normal to inspection, PERRL and conjunctivae and sclerae normal  HENT: ear canals and TM's normal, nose and mouth without ulcers or lesions  NECK: no adenopathy, no asymmetry, masses, or scars and thyroid normal to palpation  RESP: lungs clear to auscultation - no rales, rhonchi or wheezes  CV: regular rate and rhythm, normal S1 S2, no S3 or S4, no murmur, click or rub, no peripheral edema and peripheral pulses strong  ABDOMEN: soft, nontender, no hepatosplenomegaly, no masses and bowel sounds normal  MS: no gross musculoskeletal defects noted, no edema  SKIN: no suspicious lesions or rashes  NEURO: Normal strength and tone, mentation intact and speech normal  PSYCH: mentation appears normal, affect normal/bright        ASSESSMENT / PLAN:       ICD-10-CM    1. Encounter for Medicare annual wellness exam  Z00.00    2. Type 2 diabetes mellitus with foot ulcer, without long-term current use of insulin (H)  E11.621 CBC with platelets    L97.509 Comprehensive metabolic panel     HEMOGLOBIN A1C     CBC with platelets     Comprehensive metabolic panel     HEMOGLOBIN A1C   3. Chronic diastolic heart failure (H)  I50.32 Comprehensive metabolic panel     Comprehensive metabolic panel     CANCELED: Basic metabolic panel   4. Stage 3 chronic kidney disease, unspecified whether stage 3a or 3b CKD (H)  N18.30 Comprehensive metabolic panel     Comprehensive metabolic panel   5. Gouty arthropathy  M10.9 Comprehensive metabolic  panel     Comprehensive metabolic panel   6. Prostatism  N40.0    7. Chronic rhinitis  J31.0 CBC with platelets     CBC with platelets   8. Essential hypertension  I10 CANCELED: Basic metabolic panel   9. Coronary artery disease involving native coronary artery of native heart without angina pectoris  I25.10 Lipid Profile       Patient has been advised of split billing requirements and indicates understanding: Yes    COUNSELING:  Not necessary    Estimated body mass index is 18.72 kg/m  as calculated from the following:    Height as of 3/1/22: 1.829 m (6').    Weight as of 3/1/22: 62.6 kg (138 lb).        He reports that he quit smoking about 29 years ago. He smoked 0.00 packs per day. He has never used smokeless tobacco.    Appropriate preventive services were discussed with this patient, including applicable screening as appropriate for cardiovascular disease, diabetes, osteopenia/osteoporosis, and glaucoma.  As appropriate for age/gender, discussed screening for colorectal cancer, prostate cancer, breast cancer, and cervical cancer. Checklist reviewing preventive services available has been given to the patient.    Reviewed patients plan of care and provided an AVS. The  for Lance meets the Care Plan requirement. This Care Plan has been established and reviewed with the Patient.    Counseling Resources:  ATP IV Guidelines  Pooled Cohorts Equation Calculator  Breast Cancer Risk Calculator  BRCA-Related Cancer Risk Assessment: FHS-7 Tool  FRAX Risk Assessment  ICSI Preventive Guidelines  Dietary Guidelines for Americans, 2010  USDA's MyPlate  ASA Prophylaxis  Lung CA Screening    Rickey Adamson MD  [facility]

## 2022-06-16 NOTE — PATIENT INSTRUCTIONS
Patient Education   Personalized Prevention Plan  You are due for the preventive services outlined below.  Your care team is available to assist you in scheduling these services.  If you have already completed any of these items, please share that information with your care team to update in your medical record.  Health Maintenance Due   Topic Date Due     URINE DRUG SCREEN  Never done     ANNUAL REVIEW OF HM ORDERS  Never done     Hepatitis C Screening  Never done     Zoster (Shingles) Vaccine (1 of 2) Never done     Eye Exam  03/19/2020     Diabetic Foot Exam  01/15/2021     A1C Lab  05/01/2022     Basic Metabolic Panel  06/25/2022

## 2022-06-22 ENCOUNTER — OFFICE VISIT (OUTPATIENT)
Dept: PODIATRY | Facility: CLINIC | Age: 77
End: 2022-06-22
Payer: COMMERCIAL

## 2022-06-22 VITALS — WEIGHT: 244 LBS | DIASTOLIC BLOOD PRESSURE: 78 MMHG | BODY MASS INDEX: 33.56 KG/M2 | SYSTOLIC BLOOD PRESSURE: 138 MMHG

## 2022-06-22 DIAGNOSIS — Z89.422 H/O AMPUTATION OF LESSER TOE, LEFT (H): ICD-10-CM

## 2022-06-22 DIAGNOSIS — E11.42 DIABETIC POLYNEUROPATHY ASSOCIATED WITH TYPE 2 DIABETES MELLITUS (H): Primary | ICD-10-CM

## 2022-06-22 DIAGNOSIS — L97.522 DIABETIC ULCER OF OTHER PART OF LEFT FOOT ASSOCIATED WITH TYPE 2 DIABETES MELLITUS, WITH FAT LAYER EXPOSED (H): ICD-10-CM

## 2022-06-22 DIAGNOSIS — M20.5X1 HALLUX LIMITUS OF RIGHT FOOT: ICD-10-CM

## 2022-06-22 DIAGNOSIS — I73.9 PVD (PERIPHERAL VASCULAR DISEASE) (H): ICD-10-CM

## 2022-06-22 DIAGNOSIS — E11.621 DIABETIC ULCER OF OTHER PART OF LEFT FOOT ASSOCIATED WITH TYPE 2 DIABETES MELLITUS, WITH FAT LAYER EXPOSED (H): ICD-10-CM

## 2022-06-22 DIAGNOSIS — L97.512 DIABETIC ULCER OF OTHER PART OF RIGHT FOOT ASSOCIATED WITH TYPE 2 DIABETES MELLITUS, WITH FAT LAYER EXPOSED (H): ICD-10-CM

## 2022-06-22 DIAGNOSIS — M20.42 HAMMERTOE OF LEFT FOOT: ICD-10-CM

## 2022-06-22 DIAGNOSIS — E11.621 DIABETIC ULCER OF OTHER PART OF RIGHT FOOT ASSOCIATED WITH TYPE 2 DIABETES MELLITUS, WITH FAT LAYER EXPOSED (H): ICD-10-CM

## 2022-06-22 PROCEDURE — 99213 OFFICE O/P EST LOW 20 MIN: CPT | Mod: 25 | Performed by: PODIATRIST

## 2022-06-22 PROCEDURE — 11042 DBRDMT SUBQ TIS 1ST 20SQCM/<: CPT | Performed by: PODIATRIST

## 2022-06-22 NOTE — PATIENT INSTRUCTIONS
Thank you for choosing [facility] Podiatry / Foot & Ankle Surgery!    DR RANDHAWA'S CLINIC:  [facility]   [id number]   [place] [address]      TRIAGE LINE: [phone]  APPOINTMENTS: [phone]  RADIOLOGY: [phone]  SET UP SURGERY: [phone]  FAX NUMBER: [phone]  BILLING QUESTIONS: [phone]       Follow up: 1 month    Iodosorb gel

## 2022-06-22 NOTE — LETTER
6/22/2022         RE: Lance Ibrahim  [address] [place] [id number]        Dear Colleague,    Thank you for referring your patient, Lance Ibrahim, to the [facility]. Please see a copy of my visit note below.    Podiatry / Foot and Ankle Surgery Progress Note    June 22, 2022    Subject: Patient was seen for new area of concern on the left second toe.  States he noticed it a few days ago.  No pain but has baseline neuropathy.  Denies fever, nausea, vomiting.  Has not noted any drainage but notes that is how his left third toe started before it was amputated.  Like to make sure that it is okay.    Objective:  Vitals: /78   Wt 110.7 kg (244 lb)   BMI 33.56 kg/m    BMI= Body mass index is 33.56 kg/m .    A1C: 8.6 (6/2022)     General:  Patient is alert and orientated.  NAD.     Vascular:  DP and PT pulses are palpable.  significant Edema and varicosities noted.  CFT's < 3secs.  Skin temp is normal      Neuro:  Light and gross touch sensation is absent to feet.      Derm: Right foot-full thickness ulcer to the plantar aspect of the right IPJ  measuring 1.1cm x 0.3cm x 0.2cmcm after debridement.   Bases of the wounds are granular.  No purulent drainage, redness or signs of acute infection noted.  There is heavy copious amounts of clear serous drainage noted from the wound.  All other ulcerations to the right foot are healed at this time.     Left foot- full-thickness ulceration to the distal aspect of the second toe.  This measures approximately 0.6 x 0.6 x 0.1 cm after debridement.  Base of ulcer is granular.  No redness, Purulent drainage or malodor noted.     Musculoskeletal:  Decrease arch height. Lateral deviation of the right and left halluxes.  Significant decreased range of motion of the right first metatarsal phalangeal joint and inner phalangeal joint.  Previous left third toe amputation.     Assessment:     Diabetic polyneuropathy associated with type 2 diabetes  mellitus (H)  Diabetic ulcer of other part of right foot associated with type 2 diabetes mellitus, with fat layer exposed (H)  Hallux limitus of right foot  PVD (peripheral vascular disease) (H)  Diabetic ulcer of other part of left foot associated with type 2 diabetes mellitus, with fat layer exposed (H)     Medical Decision Making/Plan: At this time the wounds were debrided.  Please see procedure note below.  Right foot appears to be healing well.  He is doing Iodosorb gel dressing changes daily with a bandage.  We will have him continue to do that along with the offloading shoes and inserts.  For the left second toe we discussed that we would like to do a partial toe amputation and is quite controlled and if we did a tenotomy the toe would stick straight out and be longer in a pressure area regardless.  Given that his A1c is elevated above 8 I would hold off on elective surgery at this time.  He was given a toe crest pad to help keep pressure off the end of the toe and will go back into a DH 2 shoe on that foot.  He notes that he does have 1 at home.  He will monitor for signs of redness.  Currently there is no acute signs of infection so we will hold off on any antibiotic.  We will have him follow-up in 1 month for reassessment.     All questions were answered to patient satisfaction he will call further questions or concerns.     Procedure: After verbal consent, excisional debridement was performed on ulcers.  #15 blade was used to debride ulcers down to and including subcutaneous tissue. Bleeding controlled with light pressure.   No drainage noted.  No anesthesia was used due to neuropathy. Dry dressing applied to foot.  Patient tolerated procedure well.      Patient Risk Factor:  Patient is a high risk factor for infection.          Ayaka Azevedo DPM, Podiatry/Foot and Ankle Surgery        Again, thank you for allowing me to participate in the care of your patient.        Sincerely,        Ayaka Azevedo,  DPM, Podiatry/Foot and Ankle Surgery

## 2022-06-22 NOTE — PROGRESS NOTES
Podiatry / Foot and Ankle Surgery Progress Note    June 22, 2022    Subject: Patient was seen for new area of concern on the left second toe.  States he noticed it a few days ago.  No pain but has baseline neuropathy.  Denies fever, nausea, vomiting.  Has not noted any drainage but notes that is how his left third toe started before it was amputated.  Like to make sure that it is okay.    Objective:  Vitals: /78   Wt 110.7 kg (244 lb)   BMI 33.56 kg/m    BMI= Body mass index is 33.56 kg/m .    A1C: 8.6 (6/2022)     General:  Patient is alert and orientated.  NAD.     Vascular:  DP and PT pulses are palpable.  significant Edema and varicosities noted.  CFT's < 3secs.  Skin temp is normal      Neuro:  Light and gross touch sensation is absent to feet.      Derm: Right foot-full thickness ulcer to the plantar aspect of the right IPJ  measuring 1.1cm x 0.3cm x 0.2cmcm after debridement.   Bases of the wounds are granular.  No purulent drainage, redness or signs of acute infection noted.  There is heavy copious amounts of clear serous drainage noted from the wound.  All other ulcerations to the right foot are healed at this time.     Left foot- full-thickness ulceration to the distal aspect of the second toe.  This measures approximately 0.6 x 0.6 x 0.1 cm after debridement.  Base of ulcer is granular.  No redness, Purulent drainage or malodor noted.     Musculoskeletal:  Decrease arch height. Lateral deviation of the right and left halluxes.  Significant decreased range of motion of the right first metatarsal phalangeal joint and inner phalangeal joint.  Previous left third toe amputation.     Assessment:     Diabetic polyneuropathy associated with type 2 diabetes mellitus (H)  Diabetic ulcer of other part of right foot associated with type 2 diabetes mellitus, with fat layer exposed (H)  Hallux limitus of right foot  PVD (peripheral vascular disease) (H)  Diabetic ulcer of other part of left foot associated  with type 2 diabetes mellitus, with fat layer exposed (H)     Medical Decision Making/Plan: At this time the wounds were debrided.  Please see procedure note below.  Right foot appears to be healing well.  He is doing Iodosorb gel dressing changes daily with a bandage.  We will have him continue to do that along with the offloading shoes and inserts.  For the left second toe we discussed that we would like to do a partial toe amputation and is quite controlled and if we did a tenotomy the toe would stick straight out and be longer in a pressure area regardless.  Given that his A1c is elevated above 8 I would hold off on elective surgery at this time.  He was given a toe crest pad to help keep pressure off the end of the toe and will go back into a DH 2 shoe on that foot.  He notes that he does have 1 at home.  He will monitor for signs of redness.  Currently there is no acute signs of infection so we will hold off on any antibiotic.  We will have him follow-up in 1 month for reassessment.     All questions were answered to patient satisfaction he will call further questions or concerns.     Procedure: After verbal consent, excisional debridement was performed on ulcers.  #15 blade was used to debride ulcers down to and including subcutaneous tissue. Bleeding controlled with light pressure.   No drainage noted.  No anesthesia was used due to neuropathy. Dry dressing applied to foot.  Patient tolerated procedure well.      Patient Risk Factor:  Patient is a high risk factor for infection.       Ayaka Azevedo DPM, Podiatry/Foot and Ankle Surgery

## 2022-06-27 ENCOUNTER — MYC MEDICAL ADVICE (OUTPATIENT)
Dept: FAMILY MEDICINE | Facility: CLINIC | Age: 77
End: 2022-06-27

## 2022-06-30 NOTE — TELEPHONE ENCOUNTER
Routing to PCP to address new medication request.    Chari COSTELLO RN   Plz call and have him make an appointment to discuss rybelsis

## 2022-07-05 ENCOUNTER — TRANSFERRED RECORDS (OUTPATIENT)
Dept: MULTI SPECIALTY CLINIC | Facility: CLINIC | Age: 77
End: 2022-07-05

## 2022-07-05 LAB — RETINOPATHY: NORMAL

## 2022-07-07 NOTE — TELEPHONE ENCOUNTER
Pt called to check the status of this rx. Doesn't feel it's urgent but would like to start Rybelsus sooner rather than later and is fine with the copay. Please advise.

## 2022-07-09 ENCOUNTER — HOSPITAL ENCOUNTER (OUTPATIENT)
Dept: MRI IMAGING | Facility: CLINIC | Age: 77
Discharge: HOME | End: 2022-07-09
Attending: PODIATRIST | Admitting: PODIATRIST
Payer: COMMERCIAL

## 2022-07-09 DIAGNOSIS — M20.5X2 HALLUX LIMITUS OF LEFT FOOT: ICD-10-CM

## 2022-07-09 DIAGNOSIS — I73.9 PVD (PERIPHERAL VASCULAR DISEASE) (H): ICD-10-CM

## 2022-07-09 DIAGNOSIS — L97.512 CHRONIC ULCER OF GREAT TOE OF RIGHT FOOT WITH FAT LAYER EXPOSED (H): ICD-10-CM

## 2022-07-09 DIAGNOSIS — E11.42 DIABETIC POLYNEUROPATHY ASSOCIATED WITH TYPE 2 DIABETES MELLITUS (H): ICD-10-CM

## 2022-07-09 PROCEDURE — 73718 MRI LOWER EXTREMITY W/O DYE: CPT | Mod: RT

## 2022-07-11 ENCOUNTER — TELEPHONE (OUTPATIENT)
Dept: PODIATRY | Facility: CLINIC | Age: 77
End: 2022-07-11

## 2022-07-12 NOTE — TELEPHONE ENCOUNTER
Phone call to patient and he was informed of MRI results.   Per office visit note of 6/22/22 patient is to follow up in one month.   Patient was also informed of this and will send a [employer] message later when he has his schedule for assistance with an appointment.     CARLA Black RN

## 2022-07-13 ENCOUNTER — OFFICE VISIT (OUTPATIENT)
Dept: FAMILY MEDICINE | Facility: CLINIC | Age: 77
End: 2022-07-13
Payer: COMMERCIAL

## 2022-07-13 ENCOUNTER — TRANSFERRED RECORDS (OUTPATIENT)
Dept: HEALTH INFORMATION MANAGEMENT | Facility: CLINIC | Age: 77
End: 2022-07-13

## 2022-07-13 VITALS
SYSTOLIC BLOOD PRESSURE: 122 MMHG | OXYGEN SATURATION: 97 % | WEIGHT: 244 LBS | DIASTOLIC BLOOD PRESSURE: 62 MMHG | HEART RATE: 78 BPM | HEIGHT: 71 IN | TEMPERATURE: 98.6 F | BODY MASS INDEX: 34.16 KG/M2

## 2022-07-13 DIAGNOSIS — E11.9 TYPE 2 DIABETES, HBA1C GOAL < 7% (H): Primary | ICD-10-CM

## 2022-07-13 DIAGNOSIS — Z79.899 ENCOUNTER FOR LONG-TERM (CURRENT) USE OF MEDICATIONS: ICD-10-CM

## 2022-07-13 PROCEDURE — 99214 OFFICE O/P EST MOD 30 MIN: CPT | Performed by: FAMILY MEDICINE

## 2022-07-13 RX ORDER — ORAL SEMAGLUTIDE 3 MG/1
3 TABLET ORAL DAILY
Qty: 30 TABLET | Refills: 0 | Status: SHIPPED | OUTPATIENT
Start: 2022-07-13 | End: 2022-08-25

## 2022-07-13 RX ORDER — TIZANIDINE 2 MG/1
4 TABLET ORAL AT BEDTIME
COMMUNITY
End: 2023-01-05

## 2022-07-13 RX ORDER — ORAL SEMAGLUTIDE 7 MG/1
7 TABLET ORAL DAILY
Qty: 90 TABLET | Refills: 3 | Status: SHIPPED | OUTPATIENT
Start: 2022-07-13 | End: 2022-11-01

## 2022-07-13 RX ORDER — COLCHICINE 0.6 MG/1
0.6 TABLET ORAL DAILY
COMMUNITY
End: 2022-08-08

## 2022-07-13 ASSESSMENT — PAIN SCALES - GENERAL: PAINLEVEL: NO PAIN (0)

## 2022-07-13 NOTE — PROGRESS NOTES
"  Assessment & Plan     Encounter for long-term (current) use of medications  Patient will continue his metformin at 2000 mg/day    Type 2 diabetes, HbA1c goal < 7% (H)  Begin the following at 3 mg for 30 days; side effects of Rybelsus were discussed in 30 days he will begin 7 mg if he tolerates it well without abnormal GI effects patient was advised to eat slowly  - Semaglutide (RYBELSUS) 3 MG TABS  Dispense: 30 tablet; Refill: 0  - Semaglutide (RYBELSUS) 7 MG TABS  Dispense: 90 tablet; Refill: 3               BMI:   Estimated body mass index is 34.03 kg/m  as calculated from the following:    Height as of this encounter: 1.803 m (5' 11\").    Weight as of this encounter: 110.7 kg (244 lb).   Weight management plan: Discussed healthy diet and exercise guidelines        No follow-ups on file.    Rickey Adamson MD  [facility]   Lance is a 77 year old, presenting for the following health issues:  Recheck Medication (Medication Rybelsus follow up)      HPI we have seen Lance previously for follow-up of his diabetes mellitus type 2 after transferring his care over to this practice.  His A1c was 8.3 which was the highest it ever was.  He is on 2000 mg of metformin per day.  We entertain the idea of going on a GLP-1 and he has decided on an oral form Rybelsus.  I agree.  We will place him on 3 mg for 30 days gastrointestinal effects were discussed including decreased gastric emptying so patient should lose 5 to 10 pounds.  He should avoid fatty foods and eat slowly.  In 30 days we will begin treatment with 7 mg tablet and refills will be authorized.  I will do an A1c for follow-up in 2 months we may have him see medication management and diabetes management at that time Lance repeated the instructions back to me was nice to see him again.          Review of Systems   Constitutional, HEENT, cardiovascular, pulmonary, gi and gu systems are negative, except as otherwise noted.      Objective  " "28 year old  Chief Complaint   Patient presents with     Multiple Concerns     See back of blue sheet       Blood pressure 117/82, pulse 82, temperature 98.6  F (37  C), temperature source Oral, resp. rate 16, height 1.651 m (5' 5\"), weight 104.2 kg (229 lb 12 oz), SpO2 100 %, not currently breastfeeding. Body mass index is 38.23 kg/m .  Patient Active Problem List   Diagnosis     Generalized anxiety disorder     OCD (obsessive compulsive disorder)     Moderate recurrent major depression (H)     Environmental allergies     Irritable bowel syndrome     Small intestinal bacterial overgrowth     GBS (group B streptococcus) infection       Wt Readings from Last 2 Encounters:   04/05/19 104.2 kg (229 lb 12 oz)   04/01/19 107.5 kg (237 lb)     BP Readings from Last 3 Encounters:   04/05/19 117/82   04/02/19 (!) 129/99   01/21/19 121/72         Current Outpatient Medications   Medication     Cholecalciferol (VITAMIN D) 2000 UNITS tablet     hydrOXYzine (VISTARIL) 25 MG capsule     Omega-3 Fatty Acids (OMEGA-3 FISH OIL PO)     propranolol (INDERAL LA) 80 MG 24 hr capsule     propranolol (INDERAL) 40 MG tablet     Venlafaxine HCl (EFFEXOR PO)     Aluminum Chloride 12 % SOLN     clindamycin (CLEOCIN T) 1 % solution     hydrOXYzine (ATARAX) 25 MG tablet     levonorgestrel (KYLEENA) 19.5 MG IUD     rifaximin (XIFAXAN) 550 MG TABS tablet     No current facility-administered medications for this visit.        Social History     Tobacco Use     Smoking status: Never Smoker     Smokeless tobacco: Never Used   Substance Use Topics     Alcohol use: Yes     Alcohol/week: 1.5 oz     Comment: 1-2 EtOH drinks 2 days per week     Drug use: No       Health Maintenance Due   Topic Date Due     DEPRESSION ACTION PLAN  04/18/2007     HIV SCREEN (SYSTEM ASSIGNED)  04/18/2007     DTAP/TDAP/TD IMMUNIZATION (2 - Td) 04/27/2017     PHQ-9 Q1 MONTH  08/06/2018     INFLUENZA VACCINE (1) 09/01/2018     PHQ-9 Q3 MONTHS  10/06/2018       Lab Results "   Component Value Date    PAP NIL 09/01/2016 April 5, 2019 11:40 AM     "  /62 (BP Location: Left arm, Patient Position: Sitting, Cuff Size: Adult Large)   Pulse 78   Temp 98.6  F (37  C) (Oral)   Ht 1.803 m (5' 11\")   Wt 110.7 kg (244 lb)   SpO2 97%   BMI 34.03 kg/m    Body mass index is 34.03 kg/m .  Physical Exam   GENERAL: healthy, alert and no distress  NECK: no adenopathy, no asymmetry, masses, or scars and thyroid normal to palpation  RESP: lungs clear to auscultation - no rales, rhonchi or wheezes  CV: regular rate and rhythm, normal S1 S2, no S3 or S4, no murmur, click or rub, no peripheral edema and peripheral pulses strong  ABDOMEN: soft, nontender, no hepatosplenomegaly, no masses and bowel sounds normal  MS: no gross musculoskeletal defects noted, no edema    Neurologic-patient walks with a cane on the right side ambulates                .  ..  "

## 2022-08-02 ENCOUNTER — OFFICE VISIT (OUTPATIENT)
Dept: PODIATRY | Facility: CLINIC | Age: 77
End: 2022-08-02
Payer: COMMERCIAL

## 2022-08-02 VITALS — WEIGHT: 244 LBS | SYSTOLIC BLOOD PRESSURE: 132 MMHG | BODY MASS INDEX: 34.03 KG/M2 | DIASTOLIC BLOOD PRESSURE: 80 MMHG

## 2022-08-02 DIAGNOSIS — M20.42 HAMMERTOE, BILATERAL: ICD-10-CM

## 2022-08-02 DIAGNOSIS — I73.9 PVD (PERIPHERAL VASCULAR DISEASE) (H): ICD-10-CM

## 2022-08-02 DIAGNOSIS — L60.3 DYSTROPHIC NAIL: ICD-10-CM

## 2022-08-02 DIAGNOSIS — M20.5X1 HALLUX LIMITUS, RIGHT: ICD-10-CM

## 2022-08-02 DIAGNOSIS — E11.621 DIABETIC ULCER OF OTHER PART OF RIGHT FOOT ASSOCIATED WITH TYPE 2 DIABETES MELLITUS, WITH FAT LAYER EXPOSED (H): ICD-10-CM

## 2022-08-02 DIAGNOSIS — L97.512 DIABETIC ULCER OF OTHER PART OF RIGHT FOOT ASSOCIATED WITH TYPE 2 DIABETES MELLITUS, WITH FAT LAYER EXPOSED (H): ICD-10-CM

## 2022-08-02 DIAGNOSIS — E11.42 DIABETIC POLYNEUROPATHY ASSOCIATED WITH TYPE 2 DIABETES MELLITUS (H): Primary | ICD-10-CM

## 2022-08-02 DIAGNOSIS — M20.41 HAMMERTOE, BILATERAL: ICD-10-CM

## 2022-08-02 PROCEDURE — 99213 OFFICE O/P EST LOW 20 MIN: CPT | Mod: 25 | Performed by: PODIATRIST

## 2022-08-02 PROCEDURE — 11042 DBRDMT SUBQ TIS 1ST 20SQCM/<: CPT | Performed by: PODIATRIST

## 2022-08-02 NOTE — PROGRESS NOTES
Podiatry / Foot and Ankle Surgery Progress Note    August 2, 2022    Subject: Patient was seen for right foot ulcer.  Notes that it still been draining.  Has been using Iodosorb gel.  Denies fever, nausea, vomiting.  Notes that the left fifth toenail has been sore at times with pressure.  Notices it needs when the sheets are on it or he rolls over and has pressure on the area.  Otherwise no pain but has baseline neuropathy.    Objective:  Vitals: /80   Wt 110.7 kg (244 lb)   BMI 34.03 kg/m    BMI= Body mass index is 34.03 kg/m .    A1C: 8.6 (6/2022)     General:  Patient is alert and orientated.  NAD.     Vascular:  DP and PT pulses are palpable.  significant Edema and varicosities noted.  CFT's < 3secs.  Skin temp is normal      Neuro:  Light and gross touch sensation is absent to feet.      Derm: Right foot-full thickness ulcer to the plantar aspect of the right IPJ  measuring 1.5cm x 0.4cm x 0.2cmcm after debridement.   Bases of the wounds are granular.  No purulent drainage, redness or signs of acute infection noted.  There is heavy copious amounts of clear serous drainage noted from the wound.  All other ulcerations to the right foot are healed at this time.     Left foot- full-thickness ulceration to the distal aspect of the second toe is healed. No open lesions or signs of infection. Let 5th toenail is thickened and curved around end of toe. minimal pain on palpation.      Musculoskeletal:  Decrease arch height. Lateral deviation of the right and left halluxes.  Significant decreased range of motion of the right first metatarsal phalangeal joint and inner phalangeal joint.  Previous left third toe amputation.     Assessment:      Diabetic polyneuropathy associated with type 2 diabetes mellitus (H)  Diabetic ulcer of other part of right foot associated with type 2 diabetes mellitus, with fat layer exposed (H)  PVD (peripheral vascular disease) (H)  Hallux limitus, right  Hammertoe,  bilateral  Dystrophic nail     Medical Decision Making/Plan: At this time the wound was debrided.  Please see procedure note below.  Right foot appears stable.  No signs of infection noted.  His offloading shoe was modified to take the pegs out under where the ulcer is to keep pressure off of the area. He will continue to do Iodosorb gel dressing changes daily with a bandage.  For the left second toe we discussed that we would like to do a partial toe amputation and is quite controlled and if we did a tenotomy the toe would stick straight out and be longer in a pressure area regardless.  Given that his A1c is elevated above 8 I would hold off on elective surgery at this time.  We talked about this for his left fifth toenail as well as far as removing the nail.  Would like to wait until his A1c is below 8.  The nail was cut back today.  No charge. He was given a toe crest pad to help keep pressure off the end of the toe and will go back into a DH 2 shoe on that foot.  He notes that he does have 1 at home.  He will monitor for signs of redness.  Currently there is no acute signs of infection so we will hold off on any antibiotic.  We will have him follow-up in 1 month for reassessment.      All questions were answered to patient satisfaction he will call further questions or concerns.     Procedure: After verbal consent, excisional debridement was performed on ulcer.  #15 blade was used to debride ulcer down to and including subcutaneous tissue. Bleeding controlled with light pressure.   No drainage noted.  No anesthesia was used due to neuropathy. Dry dressing applied to foot.  Patient tolerated procedure well.      Patient Risk Factor:  Patient is a high risk factor for infection.        Ayaka Azevedo DPM, Podiatry/Foot and Ankle Surgery

## 2022-08-02 NOTE — PATIENT INSTRUCTIONS
Thank you for choosing [facility] Podiatry / Foot & Ankle Surgery!    DR RANDHAWA'S CLINIC:  [facility]   [id number]   [place] [address]      TRIAGE LINE: [phone]  APPOINTMENTS: [phone]  RADIOLOGY: [phone]  SET UP SURGERY: [phone]  FAX NUMBER: [phone]  BILLING QUESTIONS: [phone]       Follow up: 1 month

## 2022-08-02 NOTE — LETTER
8/2/2022         RE: Lance Ibrahim  [address] [place] [id number]        Dear Colleague,    Thank you for referring your patient, Lance Ibrahim, to the [facility]. Please see a copy of my visit note below.    Podiatry / Foot and Ankle Surgery Progress Note    August 2, 2022    Subject: Patient was seen for right foot ulcer.  Notes that it still been draining.  Has been using Iodosorb gel.  Denies fever, nausea, vomiting.  Notes that the left fifth toenail has been sore at times with pressure.  Notices it needs when the sheets are on it or he rolls over and has pressure on the area.  Otherwise no pain but has baseline neuropathy.    Objective:  Vitals: /80   Wt 110.7 kg (244 lb)   BMI 34.03 kg/m    BMI= Body mass index is 34.03 kg/m .    A1C: 8.6 (6/2022)     General:  Patient is alert and orientated.  NAD.     Vascular:  DP and PT pulses are palpable.  significant Edema and varicosities noted.  CFT's < 3secs.  Skin temp is normal      Neuro:  Light and gross touch sensation is absent to feet.      Derm: Right foot-full thickness ulcer to the plantar aspect of the right IPJ  measuring 1.5cm x 0.4cm x 0.2cmcm after debridement.   Bases of the wounds are granular.  No purulent drainage, redness or signs of acute infection noted.  There is heavy copious amounts of clear serous drainage noted from the wound.  All other ulcerations to the right foot are healed at this time.     Left foot- full-thickness ulceration to the distal aspect of the second toe is healed. No open lesions or signs of infection. Let 5th toenail is thickened and curved around end of toe. minimal pain on palpation.      Musculoskeletal:  Decrease arch height. Lateral deviation of the right and left halluxes.  Significant decreased range of motion of the right first metatarsal phalangeal joint and inner phalangeal joint.  Previous left third toe amputation.     Assessment:      Diabetic polyneuropathy  associated with type 2 diabetes mellitus (H)  Diabetic ulcer of other part of right foot associated with type 2 diabetes mellitus, with fat layer exposed (H)  PVD (peripheral vascular disease) (H)  Hallux limitus, right  Hammertoe, bilateral  Dystrophic nail     Medical Decision Making/Plan: At this time the wound was debrided.  Please see procedure note below.  Right foot appears stable.  No signs of infection noted.  His offloading shoe was modified to take the pegs out under where the ulcer is to keep pressure off of the area. He will continue to do Iodosorb gel dressing changes daily with a bandage.  For the left second toe we discussed that we would like to do a partial toe amputation and is quite controlled and if we did a tenotomy the toe would stick straight out and be longer in a pressure area regardless.  Given that his A1c is elevated above 8 I would hold off on elective surgery at this time.  We talked about this for his left fifth toenail as well as far as removing the nail.  Would like to wait until his A1c is below 8.  The nail was cut back today.  No charge. He was given a toe crest pad to help keep pressure off the end of the toe and will go back into a DH 2 shoe on that foot.  He notes that he does have 1 at home.  He will monitor for signs of redness.  Currently there is no acute signs of infection so we will hold off on any antibiotic.  We will have him follow-up in 1 month for reassessment.      All questions were answered to patient satisfaction he will call further questions or concerns.     Procedure: After verbal consent, excisional debridement was performed on ulcer.  #15 blade was used to debride ulcer down to and including subcutaneous tissue. Bleeding controlled with light pressure.   No drainage noted.  No anesthesia was used due to neuropathy. Dry dressing applied to foot.  Patient tolerated procedure well.      Patient Risk Factor:  Patient is a high risk factor for  infection.        Ayaka Azevedo DPM, Podiatry/Foot and Ankle Surgery        Again, thank you for allowing me to participate in the care of your patient.        Sincerely,        Ayaka Azevedo DPM, Podiatry/Foot and Ankle Surgery

## 2022-08-07 ENCOUNTER — MYC MEDICAL ADVICE (OUTPATIENT)
Dept: FAMILY MEDICINE | Facility: CLINIC | Age: 77
End: 2022-08-07

## 2022-08-07 DIAGNOSIS — M10.9 GOUTY ARTHROPATHY: Primary | ICD-10-CM

## 2022-08-08 RX ORDER — COLCHICINE 0.6 MG/1
0.6 TABLET ORAL DAILY
Qty: 30 TABLET | Refills: 1 | Status: SHIPPED | OUTPATIENT
Start: 2022-08-08 | End: 2022-09-06

## 2022-08-08 NOTE — TELEPHONE ENCOUNTER
"Outpatient Medication Detail     Disp Refills Start End SHAYNE   colchicine (COLCYRS) 0.6 MG tablet     No   Sig - Route: Take 0.6 mg by mouth daily - Oral   Class: Historical   Order: [id number]     Former patient of Spivey & has not established care with another provider.  Please assign refill request to covering provider per clinic standard process.    Routing refill request to provider for review/approval because:  Medication is reported/historical  No PCP    Last office visit provider:  7/13/22     Requested Prescriptions   Pending Prescriptions Disp Refills     colchicine (COLCYRS) 0.6 MG tablet 30 tablet 1     Sig: Take 1 tablet (0.6 mg) by mouth daily       Gout Agents Protocol Passed - 8/8/2022  9:03 AM        Passed - CBC on file in past 12 months     Recent Labs   Lab Test 06/16/22  0934   WBC 8.7   RBC 4.32*   HGB 12.2*   HCT 38.6*                    Passed - ALT on file in past 12 months     Recent Labs   Lab Test 06/16/22  0934   ALT 16             Passed - Has Uric Acid on file in past 12 months and value is less than 6     Recent Labs   Lab Test 12/07/21  1209   URIC 5.7     If level is 6mg/dL or greater, ok to refill one time and refer to provider.           Passed - Recent (12 mo) or future (30 days) visit within the authorizing provider's specialty     Patient has had an office visit with the authorizing provider or a provider within the authorizing providers department within the previous 12 mos or has a future within next 30 days. See \"Patient Info\" tab in inbasket, or \"Choose Columns\" in Meds & Orders section of the refill encounter.              Passed - Medication is active on med list        Passed - Patient is age 18 or older        Passed - Normal serum creatinine on file in the past 12 months     Recent Labs   Lab Test 06/16/22  0934 12/04/21  0852 12/03/21  1724   CR 1.25   < >  --    CRPOC  --   --  3.6*    < > = values in this interval not displayed.       Ok to refill medication if " creatinine is low               Aldo Oliva RN 08/08/22 9:03 AM

## 2022-08-17 ENCOUNTER — NURSE TRIAGE (OUTPATIENT)
Dept: INTERNAL MEDICINE | Facility: CLINIC | Age: 77
End: 2022-08-17

## 2022-08-17 ENCOUNTER — APPOINTMENT (OUTPATIENT)
Dept: ULTRASOUND IMAGING | Facility: CLINIC | Age: 77
End: 2022-08-17
Attending: EMERGENCY MEDICINE
Payer: COMMERCIAL

## 2022-08-17 ENCOUNTER — HOSPITAL ENCOUNTER (EMERGENCY)
Facility: CLINIC | Age: 77
Discharge: HOME | End: 2022-08-17
Attending: EMERGENCY MEDICINE | Admitting: EMERGENCY MEDICINE
Payer: COMMERCIAL

## 2022-08-17 VITALS
OXYGEN SATURATION: 98 % | HEART RATE: 105 BPM | SYSTOLIC BLOOD PRESSURE: 135 MMHG | RESPIRATION RATE: 19 BRPM | DIASTOLIC BLOOD PRESSURE: 71 MMHG

## 2022-08-17 DIAGNOSIS — L03.115 CELLULITIS OF RIGHT LOWER EXTREMITY: ICD-10-CM

## 2022-08-17 LAB
HOLD SPECIMEN: NORMAL

## 2022-08-17 PROCEDURE — 93971 EXTREMITY STUDY: CPT | Mod: RT

## 2022-08-17 PROCEDURE — 250N000013 HC RX MED GY IP 250 OP 250 PS 637: Performed by: EMERGENCY MEDICINE

## 2022-08-17 PROCEDURE — 99284 EMERGENCY DEPT VISIT MOD MDM: CPT | Mod: 25

## 2022-08-17 RX ORDER — DOXYCYCLINE 100 MG/1
100 CAPSULE ORAL 2 TIMES DAILY
Qty: 20 CAPSULE | Refills: 0 | Status: SHIPPED | OUTPATIENT
Start: 2022-08-17 | End: 2022-08-25

## 2022-08-17 RX ORDER — HYDROCODONE BITARTRATE AND ACETAMINOPHEN 5; 325 MG/1; MG/1
1 TABLET ORAL EVERY 6 HOURS PRN
Qty: 10 TABLET | Refills: 0 | Status: SHIPPED | OUTPATIENT
Start: 2022-08-17 | End: 2022-08-20

## 2022-08-17 RX ORDER — HYDROCODONE BITARTRATE AND ACETAMINOPHEN 5; 325 MG/1; MG/1
1 TABLET ORAL ONCE
Status: COMPLETED | OUTPATIENT
Start: 2022-08-17 | End: 2022-08-17

## 2022-08-17 RX ADMIN — HYDROCODONE BITARTRATE AND ACETAMINOPHEN 1 TABLET: 5; 325 TABLET ORAL at 10:44

## 2022-08-17 NOTE — ED PROVIDER NOTES
EMERGENCY DEPARTMENT ENCOUNTER      NAME: Lance Ibrahim  AGE: 77 year old male  YOB: 1945  MRN: [id number]  EVALUATION DATE & TIME: No admission date for patient encounter.    PCP: Anh Spivey    ED PROVIDER: Rickey Linn D.O.      Chief Complaint   Patient presents with     Leg Pain       FINAL IMPRESSION:  1. Cellulitis of right lower extremity        ED COURSE & MEDICAL DECISION MAKIN:35 AM I met with the patient to gather history and to perform my initial exam. I discussed the plan for care while in the Emergency Department. PPE worn: surgical mask, gloves   10:46 AM I rechecked and updated the patient.          Pertinent Labs & Imaging studies reviewed. (See chart for details)  77 year old male presents to the Emergency Department for evaluation of cellulitis of the right lower leg.  He did have some swelling in his leg and an ultrasound was performed.  Ultrasound does not show any evidence of DVT.  Patient was not tachycardic during my exam and was not show any signs of sepsis.  He was afebrile and otherwise stable.  Exam and history consistent with simple cellulitis of the right lower extremity.  At this time I do believe the patient is safe for discharge on doxycycline with outpatient follow-up with his primary care provider.  I did give him very strict return precautions, and he verbalized understand agreement with these.    At the conclusion of the encounter I discussed the results of all of the tests and the disposition. The questions were answered. The patient or family acknowledged understanding and was agreeable with the care plan.      HPI    Patient information was obtained from: Patient     Use of : N/A        Lance Ibrahim is a 77 year old male who presents leg swelling.     Patient reports of right leg swelling since last night. The patient states that he has had increased swelling and redness to his leg this morning. He adds that whenever he stands up or  "moves, the pain worsens and there is a \"shooting pain to his inner groin area.\" Currently in the ED, he endorses a dull pain to his leg. He otherwise denies fevers, cough, congestion, shortness of breath, sore throat, shortness of breath, and any other symptoms or complaints at this time.     REVIEW OF SYSTEMS  Constitutional:  Denies fever, chills, weight loss or weakness  Eyes:  No pain, discharge, redness  HENT:  Denies sore throat, ear pain, congestion  Respiratory: No SOB, wheeze or cough  Cardiovascular:  No CP, palpitations  GI:  Denies abdominal pain, nausea, vomiting, diarrhea  : Denies dysuria, hematuria  Musculoskeletal: Positive for leg swelling and inner groin pain. Denies any new muscle/joint pain, loss of function.  Skin: Positive for redness. Denies rash, pallor  Neurologic:  Denies headache, focal weakness or sensory changes  Lymph: Denies swollen nodes    All other systems negative unless noted in HPI.    PAST MEDICAL HISTORY:  Past Medical History:   Diagnosis Date     Arthritis     osteoarthritis knees     BPH      CAD (coronary artery disease)     subtotal occlusion in the small distal LAD      Cardiomyopathy      Cerebral artery occlusion with cerebral infarction     TIA 1993, no residual     Chronic kidney disease      Chronic rhinitis      HTN (hypertension)      Hyperlipidemia      Kidney stone      PVD (peripheral vascular disease)      Sleep apnea     doesn't use cpap     TIA (transient ischaemic attack) 1993     Type 2 diabetes mellitus - 11/08/2018     Ureteral stone        PAST SURGICAL HISTORY:  Past Surgical History:   Procedure Laterality Date     AMPUTATE TOE(S) Left 10/15/2018    Procedure: AMPUTATE TOE(S);  Left third toe amputation;  Surgeon: Ayaka Azevedo DPM, Podiatry/Foot and Ankle Surgery;  Location:  OR     ARTHROPLASTY KNEE Right 12/07/2018    Procedure: Right total knee arthroplasty;  Surgeon: Issa Cunha MD;  Location:  OR     COLONOSCOPY  03/09/2013    " Procedure: COLONOSCOPY;  COLONOSCOPY;  Surgeon: Chau Hogan MD;  Location:  GI     CV HEART CATHETERIZATION WITH POSSIBLE INTERVENTION Left 03/05/2019    Procedure: Coronary Angiogram;  Surgeon: Nas Linda MD;  Location:  HEART CARDIAC CATH LAB     Diastasis recti repair  1985     EXTRACAPSULAR CATARACT EXTRATION WITH INTRAOCULAR LENS IMPLANT Left 03/13/2017     EXTRACAPSULAR CATARACT EXTRATION WITH INTRAOCULAR LENS IMPLANT Right      FOOT SURGERY  04/2013    cyst removal      HERNIA REPAIR  07/13/2004    ventral      ORIF Shoulder Left      Ventral hernia repair NOS  1987         CURRENT MEDICATIONS:    No current facility-administered medications for this encounter.     Current Outpatient Medications   Medication     doxycycline hyclate (VIBRAMYCIN) 100 MG capsule     HYDROcodone-acetaminophen (NORCO) 5-325 MG tablet     amLODIPine (NORVASC) 5 MG tablet     amLODIPine (NORVASC) 5 MG tablet     aspirin (ASA) 81 MG EC tablet     aspirin (ASA) 81 MG EC tablet     atorvastatin (LIPITOR) 40 MG tablet     atorvastatin (LIPITOR) 40 MG tablet     blood glucose (NO BRAND SPECIFIED) lancets standard     blood glucose (NO BRAND SPECIFIED) test strip     blood glucose monitoring (NO BRAND SPECIFIED) meter device kit     celecoxib (CELEBREX) 200 MG capsule     colchicine (COLCYRS) 0.6 MG tablet     diclofenac (VOLTAREN) 1 % topical gel     Ferrous Gluconate 240 (27 Fe) MG TABS     finasteride (PROSCAR) 5 MG tablet     finasteride (PROSCAR) 5 MG tablet     fluticasone (FLONASE) 50 MCG/ACT nasal spray     fluticasone (FLONASE) 50 MCG/ACT spray     ibuprofen (ADVIL/MOTRIN) 200 MG tablet     isosorbide mononitrate (IMDUR) 30 MG 24 hr tablet     labetalol (NORMODYNE) 200 MG tablet     labetalol (NORMODYNE) 200 MG tablet     Lidocaine (LIDOCARE) 4 % Patch     lisinopril (ZESTRIL) 10 MG tablet     lisinopril (ZESTRIL) 10 MG tablet     loratadine (CLARITIN) 10 MG tablet     loratadine (CLARITIN) 10 MG tablet      "metFORMIN (GLUCOPHAGE) 1000 MG tablet     metFORMIN (GLUCOPHAGE) 1000 MG tablet     methocarbamol (ROBAXIN) 500 MG tablet     Microlet Lancets MISC     Multiple Vitamins-Minerals (MULTIVITAMIN ADULTS PO)     multivitamin w/minerals (THERA-VIT-M) tablet     Semaglutide (RYBELSUS) 3 MG TABS     Semaglutide (RYBELSUS) 7 MG TABS     silver sulfADIAZINE (SILVADENE) 1 % external cream     tamsulosin (FLOMAX) 0.4 MG 24 hr capsule     tiZANidine (ZANAFLEX) 2 MG tablet     torsemide (DEMADEX) 20 MG tablet         ALLERGIES:  Allergies   Allergen Reactions     Penicillins Anaphylaxis     \"anaphylactic\"     Sulfa Drugs      \"itchy rash, swelling of face and hands\"     Ancef [Cefazolin] Rash       FAMILY HISTORY:  Family History   Problem Relation Age of Onset     Family History Negative Mother          88 yo     Cancer Father          76 yo brain     Diabetes Maternal Grandfather          91 yo     Alcohol/Drug Paternal Grandfather              Colon Cancer No family hx of        SOCIAL HISTORY:  Social History     Socioeconomic History     Marital status:    Occupational History     Employer: SELF   Tobacco Use     Smoking status: Former Smoker     Packs/day: 0.00     Quit date: 3/17/1993     Years since quittin.4     Smokeless tobacco: Never Used   Substance and Sexual Activity     Alcohol use: Not Currently     Drug use: No     Sexual activity: Never       VITALS:  Patient Vitals for the past 24 hrs:   BP Pulse Resp SpO2   22 1052 135/71 105 19 98 %       PHYSICAL EXAM    VITAL SIGNS: /71 (BP Location: Left arm)   Pulse 105   Resp 19   SpO2 98%     General Appearance: Well-appearing, well-nourished, no acute distress   Head:  Normocephalic, without obvious abnormality, atraumatic  Eyes:  PERRL, conjunctiva/corneas clear, EOM's intact,  ENT:  Lips, mucosa, and tongue normal, membranes are moist without pallor  Neck:  Normal ROM, symmetrical, trachea midline  "   Cardio:  Regular rate and rhythm, no murmur, rub or gallop, 2+ pulses symmetric in all extremities  Pulm:  Clear to auscultation bilaterally, respirations unlabored,  Musculoskeletal: Full ROM, good ROM of major joints. Swelling to right lower extremity with redness below knee.  Integument:  Warm, Dry, No erythema, No rash.    Neurologic:  Alert & oriented.  No focal deficits appreciated.  Ambulatory.  Psychiatric:  Affect normal, Judgment normal, Mood normal.      LABS  Results for orders placed or performed during the hospital encounter of 08/17/22 (from the past 24 hour(s))   Woody Draw    Narrative    The following orders were created for panel order Woody Draw.  Procedure                               Abnormality         Status                     ---------                               -----------         ------                     Extra Blue Top Tube[[id number]]                              Final result               Extra Red Top Tube[[id number]]                               Final result               Extra Green Top (Lithium...[[id number]]                      Final result               Extra Purple Top Tube[[id number]]                            Final result                 Please view results for these tests on the individual orders.   Extra Blue Top Tube   Result Value Ref Range    Hold Specimen JIC    Extra Red Top Tube   Result Value Ref Range    Hold Specimen JIC    Extra Green Top (Lithium Heparin) Tube   Result Value Ref Range    Hold Specimen JIC    Extra Purple Top Tube   Result Value Ref Range    Hold Specimen JIC    US Lower Extremity Venous Duplex Right    Narrative    EXAM: US LOWER EXTREMITY VENOUS DUPLEX RIGHT  LOCATION: [facility]  DATE/TIME: 8/17/2022 9:36 AM    INDICATION: Right leg swelling.  COMPARISON: 02/17/2019.  TECHNIQUE: Venous Duplex ultrasound of the right lower extremity with and without compression, augmentation and duplex. Color flow and spectral  Doppler with waveform analysis performed.    FINDINGS: Exam includes the common femoral, femoral, popliteal, and contralateral common femoral veins as well as segmentally visualized deep calf veins and greater saphenous vein.     RIGHT: No deep vein thrombosis. No superficial thrombophlebitis. No popliteal cyst.      Impression    IMPRESSION:  1.  Negative right leg venous Doppler.         RADIOLOGY  US Lower Extremity Venous Duplex Right   Final Result   IMPRESSION:   1.  Negative right leg venous Doppler.             MEDICATIONS GIVEN IN THE EMERGENCY:  Medications   HYDROcodone-acetaminophen (NORCO) 5-325 MG per tablet 1 tablet (1 tablet Oral Given 8/17/22 1044)       NEW PRESCRIPTIONS STARTED AT TODAY'S ER VISIT  Discharge Medication List as of 8/17/2022 10:59 AM      START taking these medications    Details   doxycycline hyclate (VIBRAMYCIN) 100 MG capsule Take 1 capsule (100 mg) by mouth 2 times daily for 10 days, Disp-20 capsule, R-0, E-Prescribe      HYDROcodone-acetaminophen (NORCO) 5-325 MG tablet Take 1 tablet by mouth every 6 hours as needed for severe pain, Disp-10 tablet, R-0, E-Prescribe            I, Adarsh Heck, am serving as a scribe to document services personally performed by Rickey Linn DO, based on my observations and the provider's statements to me.  I, Rickey Linn DO, attest that Adarsh Heck is acting in a scribe capacity, has observed my performance of the services and has documented them in accordance with my direction.     Rickey Linn D.O.  Emergency Medicine  [facility] EMERGENCY ROOM  [address] [facility] [facility] [id number] [phone]  Dept: [phone]     Rickey Linn DO  08/17/22 1410

## 2022-08-17 NOTE — TELEPHONE ENCOUNTER
"Patient calling to report he had sudden swelling to his R leg overnight that is very painful, red, warm, and extremely swollen from his foot all the way up his calf. He has not had any strenuous activity, and resides in [facility]. He states the pain was bad last night and he was unable to sleep. Due to sudden swelling, redness and pain, patient to go to ED for evaluation.    PASTORA MontanezN, RN      Reason for Disposition    Thigh, calf, or ankle swelling in only one leg    Additional Information    Negative: Chest pain    Negative: Small area of swelling and followed an insect bite to the area    Negative: Followed a knee injury    Negative: Ankle or foot injury    Negative: Pregnant with leg swelling or edema    Negative: Difficulty breathing at rest    Negative: Entire foot is cool or blue in comparison to other side    Answer Assessment - Initial Assessment Questions  1. ONSET: \"When did the swelling start?\" (e.g., minutes, hours, days)      Overnight  2. LOCATION: \"What part of the leg is swollen?\"  \"Are both legs swollen or just one leg?\"      R leg   3. SEVERITY: \"How bad is the swelling?\" (e.g., localized; mild, moderate, severe)   - Localized - small area of swelling localized to one leg   - MILD pedal edema - swelling limited to foot and ankle, pitting edema < 1/4 inch (6 mm) deep, rest and elevation eliminate most or all swelling   - MODERATE edema - swelling of lower leg to knee, pitting edema > 1/4 inch (6 mm) deep, rest and elevation only partially reduce swelling   - SEVERE edema - swelling extends above knee, facial or hand swelling present       severe  4. REDNESS: \"Does the swelling look red or infected?\"      Reddened, warm to touch, very painful  5. PAIN: \"Is the swelling painful to touch?\" If Yes, ask: \"How painful is it?\"   (Scale 1-10; mild, moderate or severe)      8-10, can not sleep, was up all night in severe pain  6. FEVER: \"Do you have a fever?\" If Yes, ask: \"What is it, how was it measured, " "and when did it start?\"       No  7. CAUSE: \"What do you think is causing the leg swelling?\"      Unsure, sudden onset  8. MEDICAL HISTORY: \"Do you have a history of heart failure, kidney disease, liver failure, or cancer?\"      Has a TIA  9. RECURRENT SYMPTOM: \"Have you had leg swelling before?\" If Yes, ask: \"When was the last time?\" \"What happened that time?\"      No  10. OTHER SYMPTOMS: \"Do you have any other symptoms?\" (e.g., chest pain, difficulty breathing)        NO  11. PREGNANCY: \"Is there any chance you are pregnant?\" \"When was your last menstrual period?\"        Not Applicable    Protocols used: LEG SWELLING AND EDEMA-A-OH      "

## 2022-08-17 NOTE — ED TRIAGE NOTES
Triage Assessment     Row Name 08/17/22 0913       Triage Assessment (Adult)    Airway WDL WDL       Respiratory WDL    Respiratory WDL WDL       Skin Circulation/Temperature WDL    Skin Circulation/Temperature WDL WDL

## 2022-08-17 NOTE — ED TRIAGE NOTES
Triage Assessment     Row Name 08/17/22 0913       Triage Assessment (Adult)    Airway WDL WDL       Respiratory WDL    Respiratory WDL WDL       Skin Circulation/Temperature WDL    Skin Circulation/Temperature WDL WDL            pt c/o right lower leg pain and foot pain, states he called nurse line and thinks he has cellulitis. Pt has swelling and redness noted to right lower leg states very painful, started last night and pt states unable to sleep due to pain.

## 2022-08-19 ENCOUNTER — NURSE TRIAGE (OUTPATIENT)
Dept: NURSING | Facility: CLINIC | Age: 77
End: 2022-08-19

## 2022-08-19 ENCOUNTER — HOSPITAL ENCOUNTER (EMERGENCY)
Facility: CLINIC | Age: 77
Discharge: HOME | End: 2022-08-19
Attending: EMERGENCY MEDICINE | Admitting: EMERGENCY MEDICINE
Payer: COMMERCIAL

## 2022-08-19 VITALS
HEART RATE: 90 BPM | BODY MASS INDEX: 31.15 KG/M2 | RESPIRATION RATE: 16 BRPM | SYSTOLIC BLOOD PRESSURE: 141 MMHG | WEIGHT: 230 LBS | OXYGEN SATURATION: 98 % | DIASTOLIC BLOOD PRESSURE: 72 MMHG | HEIGHT: 72 IN | TEMPERATURE: 98.3 F

## 2022-08-19 DIAGNOSIS — L03.115 CELLULITIS OF RIGHT LOWER EXTREMITY: ICD-10-CM

## 2022-08-19 PROCEDURE — 99282 EMERGENCY DEPT VISIT SF MDM: CPT

## 2022-08-19 RX ORDER — CLINDAMYCIN HCL 300 MG
300 CAPSULE ORAL 4 TIMES DAILY
Qty: 40 CAPSULE | Refills: 0 | Status: ON HOLD | OUTPATIENT
Start: 2022-08-19 | End: 2022-08-29

## 2022-08-19 ASSESSMENT — ENCOUNTER SYMPTOMS
NAUSEA: 0
SHORTNESS OF BREATH: 0
CHILLS: 0
VOMITING: 0
FEVER: 0

## 2022-08-19 NOTE — ED TRIAGE NOTES
"Pt arrives to ED with c/o right leg swelling, tenderness, and redness since Tuesday. Pt was seen here Wednesday morning and given doxycycline \"which is not helping\". Redness is not past the boundary we gwendolyn up here on Wednesday but it has not gotten better. Redness and swelling from knee to ankle. Hx of type 2 insulin dependent diabetes.      Triage Assessment     Row Name 08/19/22 1043       Triage Assessment (Adult)    Airway WDL WDL       Respiratory WDL    Respiratory WDL WDL       Skin Circulation/Temperature WDL    Skin Circulation/Temperature WDL WDL       Cardiac WDL    Cardiac WDL WDL       Peripheral/Neurovascular WDL    Peripheral Neurovascular WDL WDL       Cognitive/Neuro/Behavioral WDL    Cognitive/Neuro/Behavioral WDL WDL              "

## 2022-08-19 NOTE — ED PROVIDER NOTES
EMERGENCY DEPARTMENT ENCOUNTER      NAME: Lance Ibrahim  AGE: 77 year old male  YOB: 1945  MRN: [id number]  EVALUATION DATE & TIME: 8/19/2022 11:09 AM    PCP: Anh Spivey    ED PROVIDER: Jena Bear PA-C      Chief Complaint   Patient presents with     Leg Swelling         FINAL IMPRESSION:  1. Cellulitis of right lower extremity          MEDICAL DECISION MAKING:    Pertinent Labs & Imaging studies reviewed. (See chart for details)  77 year old male presents to the Emergency Department for evaluation of right lower extremity pain and swelling.  Patient was recently seen here in the emergency department and diagnosed with right lower extremity cellulitis.  This time, ultrasound was performed and showed no signs of deep vein thrombosis.  He was sent home with doxycycline and area was marked with ink.  Since then, his symptoms have not improved so he presented to the emergency department.  On my evaluation, he was vitally stable other than slightly hypertensive at 146/68.  Exam with right lower extremity swelling and erythema within the lines marked with ink at his prior ER visit.  Area is painful to the touch and slightly warm. Differential diagnosis includes incomplete treatment of cellulitis vs treatment failure with doxycycline.     We discussed that it takes time for his symptoms to improve on oral antibiotics and it is assuring that his symptoms have not worsened and that the area of cellulitis is still within the lines marked at previous ER visit 2 days ago.  With no progression of the cellulitis and no stomach symptoms, I did not feel laboratory testing was indicated at this time. I did discuss continuing with the doxycycline versus switching to clindamycin.  We decided that he would continue with doxycycline for the next 24 hours and if symptoms were not improving that he could switch to the clindamycin.  I did provide him with a prescription for clindamycin and told him to follow-up  with his primary care provider in 1 week for recheck.  All questions were answered to the best my ability he was discharged from the emergency department in stable condition.    ED COURSE:  11:00 AM I met with the patient, obtained history, performed an initial exam, and discussed options and plan for diagnostics and treatment here in the ED.    11:15 AM I staffed the patient with Darwin Gaytan MD.    11:45 AM Patient discharged after being provided with extensive anticipatory guidance and given return precautions, importance of PCP follow-up emphasized.    At the conclusion of the encounter I discussed the results of all of the tests and the disposition. The questions were answered. The patient or family acknowledged understanding and was agreeable with the care plan.     MEDICATIONS GIVEN IN THE EMERGENCY:  Medications - No data to display    NEW PRESCRIPTIONS STARTED AT TODAY'S ER VISIT  Discharge Medication List as of 8/19/2022 12:06 PM      START taking these medications    Details   clindamycin (CLEOCIN) 300 MG capsule Take 1 capsule (300 mg) by mouth 4 times daily for 10 days, Disp-40 capsule, R-0, Local Print                  =================================================================    HPI:    Patient information was obtained from: The patient.    Use of Interpretor: N/A         Lance Ibrahim is a 77 year old male with a pertinent history of diabetes, HTN, CAD, PVD, CKD who presents to this ED via private vehicle for evaluation of right leg pain and swelling.  The patient was seen in the emergency department 2 days ago and was diagnosed with right lower extremity cellulitis after ultrasound was negative for DVT.  He was placed on doxycycline and was sent home to follow-up with his primary care provider.  Since then, his symptoms have not worsened but they also have not improved and he was concerned so he presented to the emergency department.    On my evaluation, the patient reports significant  pain and swelling in his right lower extremity unchanged from prior.  He has not had any fevers, chills, nausea, vomiting or any other concerning symptoms.  Redness has not spread past the lines drawn 2 days ago at his emergency department visit.  He has had cellulitis in the past and was placed on clindamycin at this time and this worked well for him.      REVIEW OF SYSTEMS:  Review of Systems   Constitutional: Negative for chills and fever.   Respiratory: Negative for shortness of breath.    Cardiovascular: Positive for leg swelling. Negative for chest pain.   Gastrointestinal: Negative for nausea and vomiting.   Skin: Positive for rash (Right lower extremity).   All other systems reviewed and are negative.        PAST MEDICAL HISTORY:  Past Medical History:   Diagnosis Date     Arthritis     osteoarthritis knees     BPH      CAD (coronary artery disease)     subtotal occlusion in the small distal LAD      Cardiomyopathy      Cerebral artery occlusion with cerebral infarction     TIA 1993, no residual     Chronic kidney disease      Chronic rhinitis      HTN (hypertension)      Hyperlipidemia      Kidney stone      PVD (peripheral vascular disease)      Sleep apnea     doesn't use cpap     TIA (transient ischaemic attack) 1993     Type 2 diabetes mellitus - 11/08/2018     Ureteral stone        PAST SURGICAL HISTORY:  Past Surgical History:   Procedure Laterality Date     AMPUTATE TOE(S) Left 10/15/2018    Procedure: AMPUTATE TOE(S);  Left third toe amputation;  Surgeon: Ayaka Azevedo DPM, Podiatry/Foot and Ankle Surgery;  Location:  OR     ARTHROPLASTY KNEE Right 12/07/2018    Procedure: Right total knee arthroplasty;  Surgeon: Issa Cunha MD;  Location:  OR     COLONOSCOPY  03/09/2013    Procedure: COLONOSCOPY;  COLONOSCOPY;  Surgeon: Chau Hogan MD;  Location:  GI     CV HEART CATHETERIZATION WITH POSSIBLE INTERVENTION Left 03/05/2019    Procedure: Coronary Angiogram;  Surgeon: Alexei  Nas MORALES MD;  Location:  HEART CARDIAC CATH LAB     Diastasis recti repair  1985     EXTRACAPSULAR CATARACT EXTRATION WITH INTRAOCULAR LENS IMPLANT Left 03/13/2017     EXTRACAPSULAR CATARACT EXTRATION WITH INTRAOCULAR LENS IMPLANT Right      FOOT SURGERY  04/2013    cyst removal      HERNIA REPAIR  07/13/2004    ventral      ORIF Shoulder Left      Ventral hernia repair NOS  1987           CURRENT MEDICATIONS:    No current facility-administered medications for this encounter.    Current Outpatient Medications:      clindamycin (CLEOCIN) 300 MG capsule, Take 1 capsule (300 mg) by mouth 4 times daily for 10 days, Disp: 40 capsule, Rfl: 0     amLODIPine (NORVASC) 5 MG tablet, Take by mouth every 24 hours, Disp: , Rfl:      amLODIPine (NORVASC) 5 MG tablet, Take 1 tablet (5 mg) by mouth daily, Disp: 90 tablet, Rfl: 3     aspirin (ASA) 81 MG EC tablet, Take by mouth every 24 hours, Disp: , Rfl:      aspirin (ASA) 81 MG EC tablet, Take 1 tablet (81 mg) by mouth daily, Disp: 100 tablet, Rfl: 3     atorvastatin (LIPITOR) 40 MG tablet, Take by mouth every 24 hours, Disp: , Rfl:      atorvastatin (LIPITOR) 40 MG tablet, Take 1 tablet (40 mg) by mouth daily, Disp: 90 tablet, Rfl: 3     blood glucose (NO BRAND SPECIFIED) lancets standard, Use to test blood sugar 2 times daily or as directed., Disp: 100 lancet, Rfl: 4     blood glucose (NO BRAND SPECIFIED) test strip, Use to test blood sugar 2 times daily or as directed., Disp: 100 strip, Rfl: 4     blood glucose monitoring (NO BRAND SPECIFIED) meter device kit, Use to test blood sugar 2 times daily or as directed., Disp: 1 kit, Rfl: 0     celecoxib (CELEBREX) 200 MG capsule, TAKE 1 CAPSULE BY MOUTH TWICE A DAY, Disp: , Rfl:      colchicine (COLCYRS) 0.6 MG tablet, Take 1 tablet (0.6 mg) by mouth daily, Disp: 30 tablet, Rfl: 1     diclofenac (VOLTAREN) 1 % topical gel, Apply 4 g topically 4 times daily, Disp: , Rfl:      doxycycline hyclate (VIBRAMYCIN) 100 MG capsule, Take  1 capsule (100 mg) by mouth 2 times daily for 10 days, Disp: 20 capsule, Rfl: 0     Ferrous Gluconate 240 (27 Fe) MG TABS, Take 1 tablet by mouth daily , Disp: , Rfl:      finasteride (PROSCAR) 5 MG tablet, Take by mouth every 24 hours, Disp: , Rfl:      finasteride (PROSCAR) 5 MG tablet, Take 5 mg by mouth daily., Disp: , Rfl:      fluticasone (FLONASE) 50 MCG/ACT nasal spray, Spray 1-2 sprays in nostril every 24 hours, Disp: , Rfl:      fluticasone (FLONASE) 50 MCG/ACT spray, Spray 1 spray into both nostrils At Bedtime , Disp: , Rfl:      HYDROcodone-acetaminophen (NORCO) 5-325 MG tablet, Take 1 tablet by mouth every 6 hours as needed for severe pain, Disp: 10 tablet, Rfl: 0     ibuprofen (ADVIL/MOTRIN) 200 MG tablet, Take by mouth every 6 hours, Disp: , Rfl:      isosorbide mononitrate (IMDUR) 30 MG 24 hr tablet, Take 1 tablet (30 mg) by mouth daily, Disp: 90 tablet, Rfl: 3     labetalol (NORMODYNE) 200 MG tablet, Take by mouth every 24 hours, Disp: , Rfl:      labetalol (NORMODYNE) 200 MG tablet, TAKE 1 TABLET BY MOUTH TWICE A DAY, Disp: 180 tablet, Rfl: 3     Lidocaine (LIDOCARE) 4 % Patch, Place 1 patch onto the skin every 24 hours To prevent lidocaine toxicity, patient should be patch free for 12 hrs daily., Disp: , Rfl:      lisinopril (ZESTRIL) 10 MG tablet, Take by mouth every 24 hours, Disp: , Rfl:      lisinopril (ZESTRIL) 10 MG tablet, Take 1 tablet (10 mg) by mouth daily, Disp: 90 tablet, Rfl: 3     loratadine (CLARITIN) 10 MG tablet, Take by mouth every 24 hours, Disp: , Rfl:      loratadine (CLARITIN) 10 MG tablet, Take 10 mg by mouth At Bedtime , Disp: , Rfl:      metFORMIN (GLUCOPHAGE) 1000 MG tablet, Take by mouth every 24 hours, Disp: , Rfl:      metFORMIN (GLUCOPHAGE) 1000 MG tablet, TAKE 1 TABLET BY MOUTH TWICE A DAY WITH MEALS, Disp: 180 tablet, Rfl: 1     methocarbamol (ROBAXIN) 500 MG tablet, TAKE 2 TABLETS BY MOUTH 4 TIMES A DAY, Disp: , Rfl:      Microlet Lancets MISC, USE TO TEST BLOOD  "SUGAR 2 TIMES DAILY OR AS DIRECTED., Disp: , Rfl:      Multiple Vitamins-Minerals (MULTIVITAMIN ADULTS PO), Take 1 tablet by mouth daily , Disp: , Rfl:      multivitamin w/minerals (THERA-VIT-M) tablet, , Disp: , Rfl:      Semaglutide (RYBELSUS) 3 MG TABS, Take 3 mg by mouth daily, Disp: 30 tablet, Rfl: 0     Semaglutide (RYBELSUS) 7 MG TABS, Take 7 mg by mouth daily, Disp: 90 tablet, Rfl: 3     silver sulfADIAZINE (SILVADENE) 1 % external cream, Apply topically daily To bilateral foot wounds, Disp: , Rfl:      tamsulosin (FLOMAX) 0.4 MG 24 hr capsule, Take 1 capsule by mouth daily., Disp: , Rfl:      tiZANidine (ZANAFLEX) 2 MG tablet, Take 2 mg by mouth 3 times daily, Disp: , Rfl:      torsemide (DEMADEX) 20 MG tablet, Take 1 tablet (20 mg) by mouth daily, Disp: 90 tablet, Rfl: 2      ALLERGIES:  Allergies   Allergen Reactions     Penicillins Anaphylaxis     \"anaphylactic\"     Sulfa Drugs      \"itchy rash, swelling of face and hands\"     Ancef [Cefazolin] Rash       FAMILY HISTORY:  Family History   Problem Relation Age of Onset     Family History Negative Mother          88 yo     Cancer Father          74 yo brain     Diabetes Maternal Grandfather          89 yo     Alcohol/Drug Paternal Grandfather              Colon Cancer No family hx of        SOCIAL HISTORY:   Social History     Socioeconomic History     Marital status:    Occupational History     Employer: SELF   Tobacco Use     Smoking status: Former Smoker     Packs/day: 0.00     Quit date: 3/17/1993     Years since quittin.4     Smokeless tobacco: Never Used   Substance and Sexual Activity     Alcohol use: Not Currently     Drug use: No     Sexual activity: Never       VITALS:  Patient Vitals for the past 24 hrs:   BP Temp Temp src Pulse Resp SpO2 Height Weight   22 1215 (!) 141/72 -- -- 90 -- 98 % -- --   22 1041 (!) 146/68 98.3  F (36.8  C) Temporal 95 16 99 % 1.829 m (6') 104.3 kg (230 lb) "       PHYSICAL EXAM    Constitutional: Well developed, Well nourished, NAD  HENT: Normocephalic, Atraumatic, Bilateral external ears normal, Oropharynx normal, mucous membranes moist, Nose normal.   Neck: Normal range of motion, No tenderness, Supple, No stridor.  Eyes: Eyes track normally throughout exam, Conjunctiva normal, No discharge.   Respiratory: Normal breath sounds, No respiratory distress, No wheezing, Speaks full sentences easily. No cough.  Cardiovascular: Normal heart rate, Regular rhythm, No murmurs, No rubs, No gallops.   Musculoskeletal: 2+ DP pulses. No edema. No cyanosis, No clubbing. Good range of motion in all major joints. No tenderness to palpation or major deformities noted. No tenderness of the CTLS spine.   Integument: Erythema of the right lower extremity extending from the ankle to just below the knee not extending past the lines drawn from previous ER visit.  The area is slightly warm to the touch and tender.  Warm, Dry, No rash. No petechiae.  Neurologic: Alert & oriented x 3, Normal motor function, Normal sensory function, No focal deficits noted. Normal gait.  Psychiatric: Affect normal, Judgment normal, Mood normal. Cooperative.    Jena Bear PA-C  Emergency Medicine  [facility]  8/19/2022      Jena Bear PA-C  08/19/22 2178

## 2022-08-19 NOTE — ED PROVIDER NOTES
Emergency Department Midlevel Supervisory Note     I personally saw the patient and performed a substantive portion of the visit including all aspects of the medical decision making.    ED Course:  11:36 AM Jena Bear PA-C staffed patient with me. I agree with their assessment and plan of management, and I will see the patient.  11:39 AM I met with the patient to introduce myself, gather additional history, perform my initial exam, and discuss the plan.     Brief HPI:     Lance Ibrahim is a 77 year old male who presents for evaluation of right leg swelling.  Recent diagnosis of cellulitis, started on doxycycline, no change in pain, swelling, margins of erythema.  Has had clindamycin in the past and is pretty sure that symptoms improved at a more rapid pace.    I, Fabian Benitez, am serving as a scribe to documentservices personally performed by Darwin Quarles MD based on my observation and the provider's statements to me.  I, Darwin Gaytan MD attest that Fabian Benitez is acting in a scribe capacity, has observed my performance of the services and has documented them in accordance with my direction.    Brief Physical Exam: BP (!) 141/72   Pulse 90   Temp 98.3  F (36.8  C) (Temporal)   Resp 16   Ht 1.829 m (6')   Wt 104.3 kg (230 lb)   SpO2 98%   BMI 31.19 kg/m    Constitutional:  Alert, in no acute distress  EYES: Conjunctivae clear  HENT:  Atraumatic, normocephalic  Respiratory:  Respirations even, unlabored, in no acute respiratory distress  Cardiovascular:  Regular rate and rhythm, good peripheral perfusion  GI: Soft, nondistended, nontender, no palpable masses, no rebound, no guarding   Musculoskeletal:  No edema. No cyanosis. Range of motion major extremities intact.    Integument:  Erythema, edema to the right lower extremity, demarcated with ink, margins of erythema are within the lines.  Neurologic:  Alert & oriented, no focal deficits noted  Psych: Normal mood and affect      MDM:  Cellulitis, slow to react to treatment versus only partially treated.  Discussed that patient is still within the period where healing slowly turns around, however he was given a written prescription for clindamycin since this has worked in the past and he can fill this this evening if its not improving.       1. Cellulitis of right lower extremity        Labs and Imaging:     I have reviewed the relevant laboratory and radiology studies    Procedures:  I was present for the key portions of this procedure: none    Darwin Gaytan MD  [facility] EMERGENCY ROOM  [address] [facility] [address] [address]  [phone]       Darwin Gaytan MD  08/19/22 1784

## 2022-08-19 NOTE — DISCHARGE INSTRUCTIONS
You were seen and evaluated here in the ED for cellulitis of your right leg. It is reassuring that the redness and swelling has not spread. It does take some time for the antibiotics to start to decrease redness/swelling, but you have had success with clindamycin in the past and I will write you a prescription for this to take if over the next day your symptoms do not start to improve.     I recommend you still follow up with your PCP as previously instructed. Return with any fevers, uncontrolled vomiting, worsening redness/swelling.

## 2022-08-19 NOTE — TELEPHONE ENCOUNTER
Cellulitis.  On abx for 48 hours without any improvement.  Severe pain making walking difficult.  Instructed return to ER.  Caller stated understanding and agreement.      Reason for Disposition    SEVERE pain    Additional Information    Negative: SEVERE difficulty breathing (e.g., struggling for each breath, speaks in single words)    Negative: Sounds like a life-threatening emergency to the triager    Negative: Shock suspected (e.g., cold/pale/clammy skin, too weak to stand, low BP, rapid pulse)    Negative: Sounds like a life-threatening emergency to the triager    Protocols used: CELLULITIS ON ANTIBIOTIC FOLLOW-UP CALL-A-OH, INFECTION ON ANTIBIOTIC FOLLOW-UP CALL-A-OH    Yolanda WIN RN [facility]

## 2022-08-22 ENCOUNTER — MYC MEDICAL ADVICE (OUTPATIENT)
Dept: WOUND CARE | Facility: CLINIC | Age: 77
End: 2022-08-22

## 2022-08-22 NOTE — TELEPHONE ENCOUNTER
Please see patient MyChart message and advise if ok to proceed with procedure on Wednesday.     Celestina Gonzalez MSA, ATC  Certified Athletic Trainer

## 2022-08-22 NOTE — TELEPHONE ENCOUNTER
8/24/22 appointment cancelled per patient request. Patient will call back to reschedule.     Celestina Gonzalez MSA, ATC  Certified Athletic Trainer

## 2022-08-22 NOTE — TELEPHONE ENCOUNTER
I would hold off on the procedure until the right leg cellulitis has cleared up because I don't want the left foot to get infected or get a deeper infection that goes into the bone.     Please let patient know.     Thanks,     Ayaka Azevedo DPM

## 2022-08-25 ENCOUNTER — APPOINTMENT (OUTPATIENT)
Dept: RADIOLOGY | Facility: CLINIC | Age: 77
DRG: 603 | End: 2022-08-25
Attending: EMERGENCY MEDICINE
Payer: COMMERCIAL

## 2022-08-25 ENCOUNTER — HOSPITAL ENCOUNTER (INPATIENT)
Facility: CLINIC | Age: 77
LOS: 4 days | Discharge: HOME | DRG: 603 | End: 2022-08-29
Attending: EMERGENCY MEDICINE | Admitting: HOSPITALIST
Payer: COMMERCIAL

## 2022-08-25 DIAGNOSIS — L03.115 CELLULITIS OF RIGHT LOWER EXTREMITY: ICD-10-CM

## 2022-08-25 LAB
ANION GAP SERPL CALCULATED.3IONS-SCNC: 11 MMOL/L (ref 5–18)
BASOPHILS # BLD AUTO: 0.1 10E3/UL (ref 0–0.2)
BASOPHILS NFR BLD AUTO: 1 %
BUN SERPL-MCNC: 23 MG/DL (ref 8–28)
C REACTIVE PROTEIN LHE: 4.7 MG/DL (ref 0–?)
CALCIUM SERPL-MCNC: 9.3 MG/DL (ref 8.5–10.5)
CHLORIDE BLD-SCNC: 106 MMOL/L (ref 98–107)
CO2 SERPL-SCNC: 21 MMOL/L (ref 22–31)
CREAT SERPL-MCNC: 1.57 MG/DL (ref 0.7–1.3)
EOSINOPHIL # BLD AUTO: 0.2 10E3/UL (ref 0–0.7)
EOSINOPHIL NFR BLD AUTO: 1 %
ERYTHROCYTE [DISTWIDTH] IN BLOOD BY AUTOMATED COUNT: 16 % (ref 10–15)
GFR SERPL CREATININE-BSD FRML MDRD: 45 ML/MIN/1.73M2
GLUCOSE BLD-MCNC: 100 MG/DL (ref 70–125)
HCT VFR BLD AUTO: 34 % (ref 40–53)
HGB BLD-MCNC: 10.9 G/DL (ref 13.3–17.7)
HOLD SPECIMEN: NORMAL
IMM GRANULOCYTES # BLD: 0.3 10E3/UL
IMM GRANULOCYTES NFR BLD: 2 %
LYMPHOCYTES # BLD AUTO: 2.4 10E3/UL (ref 0.8–5.3)
LYMPHOCYTES NFR BLD AUTO: 20 %
MCH RBC QN AUTO: 28.6 PG (ref 26.5–33)
MCHC RBC AUTO-ENTMCNC: 32.1 G/DL (ref 31.5–36.5)
MCV RBC AUTO: 89 FL (ref 78–100)
MONOCYTES # BLD AUTO: 0.9 10E3/UL (ref 0–1.3)
MONOCYTES NFR BLD AUTO: 7 %
NEUTROPHILS # BLD AUTO: 8.4 10E3/UL (ref 1.6–8.3)
NEUTROPHILS NFR BLD AUTO: 69 %
NRBC # BLD AUTO: 0 10E3/UL
NRBC BLD AUTO-RTO: 0 /100
PLATELET # BLD AUTO: 441 10E3/UL (ref 150–450)
POTASSIUM BLD-SCNC: 5.2 MMOL/L (ref 3.5–5)
RBC # BLD AUTO: 3.81 10E6/UL (ref 4.4–5.9)
SARS-COV-2 RNA RESP QL NAA+PROBE: NEGATIVE
SODIUM SERPL-SCNC: 138 MMOL/L (ref 136–145)
WBC # BLD AUTO: 12.2 10E3/UL (ref 4–11)

## 2022-08-25 PROCEDURE — 73660 X-RAY EXAM OF TOE(S): CPT | Mod: RT

## 2022-08-25 PROCEDURE — 999N000157 HC STATISTIC RCP TIME EA 10 MIN

## 2022-08-25 PROCEDURE — 250N000011 HC RX IP 250 OP 636: Performed by: HOSPITALIST

## 2022-08-25 PROCEDURE — 99223 1ST HOSP IP/OBS HIGH 75: CPT | Performed by: HOSPITALIST

## 2022-08-25 PROCEDURE — 250N000013 HC RX MED GY IP 250 OP 250 PS 637: Performed by: HOSPITALIST

## 2022-08-25 PROCEDURE — 85025 COMPLETE CBC W/AUTO DIFF WBC: CPT | Performed by: EMERGENCY MEDICINE

## 2022-08-25 PROCEDURE — 86140 C-REACTIVE PROTEIN: CPT | Performed by: EMERGENCY MEDICINE

## 2022-08-25 PROCEDURE — 99285 EMERGENCY DEPT VISIT HI MDM: CPT | Mod: 25

## 2022-08-25 PROCEDURE — 96375 TX/PRO/DX INJ NEW DRUG ADDON: CPT

## 2022-08-25 PROCEDURE — 96365 THER/PROPH/DIAG IV INF INIT: CPT

## 2022-08-25 PROCEDURE — U0003 INFECTIOUS AGENT DETECTION BY NUCLEIC ACID (DNA OR RNA); SEVERE ACUTE RESPIRATORY SYNDROME CORONAVIRUS 2 (SARS-COV-2) (CORONAVIRUS DISEASE [COVID-19]), AMPLIFIED PROBE TECHNIQUE, MAKING USE OF HIGH THROUGHPUT TECHNOLOGIES AS DESCRIBED BY CMS-2020-01-R: HCPCS | Performed by: EMERGENCY MEDICINE

## 2022-08-25 PROCEDURE — 36415 COLL VENOUS BLD VENIPUNCTURE: CPT | Performed by: EMERGENCY MEDICINE

## 2022-08-25 PROCEDURE — C9803 HOPD COVID-19 SPEC COLLECT: HCPCS

## 2022-08-25 PROCEDURE — 80048 BASIC METABOLIC PNL TOTAL CA: CPT | Performed by: EMERGENCY MEDICINE

## 2022-08-25 PROCEDURE — 250N000011 HC RX IP 250 OP 636: Performed by: EMERGENCY MEDICINE

## 2022-08-25 PROCEDURE — 120N000001 HC R&B MED SURG/OB

## 2022-08-25 PROCEDURE — 250N000013 HC RX MED GY IP 250 OP 250 PS 637: Performed by: EMERGENCY MEDICINE

## 2022-08-25 PROCEDURE — 258N000003 HC RX IP 258 OP 636: Performed by: EMERGENCY MEDICINE

## 2022-08-25 PROCEDURE — 87040 BLOOD CULTURE FOR BACTERIA: CPT | Performed by: EMERGENCY MEDICINE

## 2022-08-25 RX ORDER — OXYCODONE AND ACETAMINOPHEN 5; 325 MG/1; MG/1
1 TABLET ORAL ONCE
Status: COMPLETED | OUTPATIENT
Start: 2022-08-25 | End: 2022-08-25

## 2022-08-25 RX ORDER — NALOXONE HYDROCHLORIDE 0.4 MG/ML
0.4 INJECTION, SOLUTION INTRAMUSCULAR; INTRAVENOUS; SUBCUTANEOUS
Status: DISCONTINUED | OUTPATIENT
Start: 2022-08-25 | End: 2022-08-29 | Stop reason: HOSPADM

## 2022-08-25 RX ORDER — NALOXONE HYDROCHLORIDE 0.4 MG/ML
0.2 INJECTION, SOLUTION INTRAMUSCULAR; INTRAVENOUS; SUBCUTANEOUS
Status: DISCONTINUED | OUTPATIENT
Start: 2022-08-25 | End: 2022-08-29 | Stop reason: HOSPADM

## 2022-08-25 RX ORDER — FINASTERIDE 5 MG/1
5 TABLET, FILM COATED ORAL DAILY
Status: DISCONTINUED | OUTPATIENT
Start: 2022-08-26 | End: 2022-08-29 | Stop reason: HOSPADM

## 2022-08-25 RX ORDER — AMLODIPINE BESYLATE 5 MG/1
5 TABLET ORAL DAILY
Status: DISCONTINUED | OUTPATIENT
Start: 2022-08-26 | End: 2022-08-29 | Stop reason: HOSPADM

## 2022-08-25 RX ORDER — FUROSEMIDE 10 MG/ML
20 INJECTION INTRAMUSCULAR; INTRAVENOUS ONCE
Status: COMPLETED | OUTPATIENT
Start: 2022-08-25 | End: 2022-08-25

## 2022-08-25 RX ORDER — TIZANIDINE 2 MG/1
4 TABLET ORAL AT BEDTIME
Status: DISCONTINUED | OUTPATIENT
Start: 2022-08-25 | End: 2022-08-29 | Stop reason: HOSPADM

## 2022-08-25 RX ORDER — LIDOCAINE 40 MG/G
CREAM TOPICAL
Status: DISCONTINUED | OUTPATIENT
Start: 2022-08-25 | End: 2022-08-29 | Stop reason: HOSPADM

## 2022-08-25 RX ORDER — TAMSULOSIN HYDROCHLORIDE 0.4 MG/1
0.4 CAPSULE ORAL DAILY
Status: DISCONTINUED | OUTPATIENT
Start: 2022-08-26 | End: 2022-08-29 | Stop reason: HOSPADM

## 2022-08-25 RX ORDER — HYDROCODONE BITARTRATE AND ACETAMINOPHEN 5; 325 MG/1; MG/1
1 TABLET ORAL AT BEDTIME
COMMUNITY
End: 2022-09-09

## 2022-08-25 RX ORDER — ATORVASTATIN CALCIUM 10 MG/1
40 TABLET, FILM COATED ORAL EVERY EVENING
Status: DISCONTINUED | OUTPATIENT
Start: 2022-08-25 | End: 2022-08-29 | Stop reason: HOSPADM

## 2022-08-25 RX ORDER — ISOSORBIDE MONONITRATE 30 MG/1
30 TABLET, EXTENDED RELEASE ORAL DAILY
Status: DISCONTINUED | OUTPATIENT
Start: 2022-08-26 | End: 2022-08-29 | Stop reason: HOSPADM

## 2022-08-25 RX ORDER — OXYCODONE HYDROCHLORIDE 5 MG/1
10 TABLET ORAL EVERY 6 HOURS PRN
Status: DISCONTINUED | OUTPATIENT
Start: 2022-08-25 | End: 2022-08-29 | Stop reason: HOSPADM

## 2022-08-25 RX ORDER — ACETAMINOPHEN 325 MG/1
975 TABLET ORAL EVERY 8 HOURS
Status: DISCONTINUED | OUTPATIENT
Start: 2022-08-25 | End: 2022-08-29 | Stop reason: HOSPADM

## 2022-08-25 RX ORDER — CEFEPIME HYDROCHLORIDE 1 G/1
1 INJECTION, POWDER, FOR SOLUTION INTRAMUSCULAR; INTRAVENOUS ONCE
Status: COMPLETED | OUTPATIENT
Start: 2022-08-25 | End: 2022-08-25

## 2022-08-25 RX ORDER — LABETALOL 100 MG/1
200 TABLET, FILM COATED ORAL 2 TIMES DAILY
Status: DISCONTINUED | OUTPATIENT
Start: 2022-08-25 | End: 2022-08-29 | Stop reason: HOSPADM

## 2022-08-25 RX ORDER — ASPIRIN 81 MG/1
81 TABLET ORAL DAILY
Status: DISCONTINUED | OUTPATIENT
Start: 2022-08-26 | End: 2022-08-29 | Stop reason: HOSPADM

## 2022-08-25 RX ADMIN — TIZANIDINE 4 MG: 4 TABLET ORAL at 21:50

## 2022-08-25 RX ADMIN — CEFEPIME HYDROCHLORIDE 1 G: 1 INJECTION, POWDER, FOR SOLUTION INTRAMUSCULAR; INTRAVENOUS at 20:05

## 2022-08-25 RX ADMIN — FUROSEMIDE 20 MG: 10 INJECTION, SOLUTION INTRAVENOUS at 21:50

## 2022-08-25 RX ADMIN — OXYCODONE AND ACETAMINOPHEN 1 TABLET: 5; 325 TABLET ORAL at 19:11

## 2022-08-25 RX ADMIN — ACETAMINOPHEN 975 MG: 325 TABLET ORAL at 21:50

## 2022-08-25 RX ADMIN — VANCOMYCIN HYDROCHLORIDE 1750 MG: 5 INJECTION, POWDER, LYOPHILIZED, FOR SOLUTION INTRAVENOUS at 20:53

## 2022-08-25 RX ADMIN — ATORVASTATIN CALCIUM 40 MG: 40 TABLET, FILM COATED ORAL at 21:50

## 2022-08-25 RX ADMIN — LABETALOL HYDROCHLORIDE 200 MG: 100 TABLET, FILM COATED ORAL at 21:50

## 2022-08-25 ASSESSMENT — ACTIVITIES OF DAILY LIVING (ADL)
ADLS_ACUITY_SCORE: 35
WALKING_OR_CLIMBING_STAIRS_DIFFICULTY: NO
ADLS_ACUITY_SCORE: 23
DOING_ERRANDS_INDEPENDENTLY_DIFFICULTY: NO
CHANGE_IN_FUNCTIONAL_STATUS_SINCE_ONSET_OF_CURRENT_ILLNESS/INJURY: NO
WEAR_GLASSES_OR_BLIND: YES
TOILETING_ISSUES: NO
CONCENTRATING,_REMEMBERING_OR_MAKING_DECISIONS_DIFFICULTY: NO
VISION_MANAGEMENT: GLASSES
ADLS_ACUITY_SCORE: 35
FALL_HISTORY_WITHIN_LAST_SIX_MONTHS: NO
EQUIPMENT_CURRENTLY_USED_AT_HOME: WALKER, ROLLING;CANE, STRAIGHT
DRESSING/BATHING_DIFFICULTY: NO
DIFFICULTY_EATING/SWALLOWING: NO

## 2022-08-25 NOTE — ED PROVIDER NOTES
EMERGENCY DEPARTMENT ENCOUNTER      NAME: Lance Ibrahim  AGE: 77 year old male  YOB: 1945  MRN: [id number]  EVALUATION DATE & TIME: 2022  6:23 PM    PCP: Anh Spivey    ED PROVIDER: Antonio Hwang M.D.      Chief Complaint   Patient presents with     Leg Swelling       FINAL IMPRESSION:  1. Cellulitis of right lower extremity        ED COURSE & MEDICAL DECISION MAKIN year old male presents to the Emergency Department for evaluation of lower extremity infection.  Has been seen a couple of times in the emergency department and appears to be not responding to oral antibiotics with her previous prescription for doxycycline and more recently clindamycin.  Has had DVT ultrasound previously which was negative.  Does have elevated inflammatory markers and white blood cell count however vital signs are stable he does not appear septic.  X-rays negative for subcutaneous gas or osteomyelitis.  He was started on IV vancomycin.  He does have an intolerance to cephalosporins which cause a rash in the past.  I do think he does require gram-negative coverage, so we will cautiously utilize cefepime.  Discussed this plan with the hospitalist Dr Cartagena.  Patient will be admitted to [facility].      MEDICATIONS GIVEN IN THE EMERGENCY:  Medications   vancomycin 1750 mg in 0.9% NaCl 500 ml intermittent infusion 1,750 mg (has no administration in time range)   ceFEPIme (MAXIPIME) 1g vial to attach to  ml bag for ADULTS or NS 50 ml bag for PEDS (1 g Intravenous New Bag 22)   furosemide (LASIX) injection 20 mg (has no administration in time range)   oxyCODONE (ROXICODONE) tablet 10 mg (has no administration in time range)   oxyCODONE-acetaminophen (PERCOCET) 5-325 MG per tablet 1 tablet (1 tablet Oral Given 22)       NEW PRESCRIPTIONS STARTED AT TODAY'S ER VISIT  New Prescriptions    No medications on file           =================================================================    HPI    Patient information was obtained from: Patient    Use of : N/A       Lance Ibrahim is a 77 year old male with a pertinent history of DMII, TIA, CAD, CKD3, HTN, HLD, who presents to this ED via walk-in for evaluation of leg swelling.    Patient endorses cellulitis with swelling of his right leg up to the groin. He has had an injury to his left great toe for 14 months. He says he has been on clindamycin for 7 days, after being on doxycycline for 3 days.    Patient endorses right leg swelling. Patient denies fever and all other relevant symptoms.      REVIEW OF SYSTEMS   All systems reviewed and negative except as noted in HPI.    PAST MEDICAL HISTORY:  Past Medical History:   Diagnosis Date     Arthritis     osteoarthritis knees     BPH      CAD (coronary artery disease)     subtotal occlusion in the small distal LAD      Cardiomyopathy      Cerebral artery occlusion with cerebral infarction     TIA 1993, no residual     Chronic kidney disease      Chronic rhinitis      HTN (hypertension)      Hyperlipidemia      Kidney stone      PVD (peripheral vascular disease)      Sleep apnea     doesn't use cpap     TIA (transient ischaemic attack) 1993     Type 2 diabetes mellitus - 11/08/2018     Ureteral stone        PAST SURGICAL HISTORY:  Past Surgical History:   Procedure Laterality Date     AMPUTATE TOE(S) Left 10/15/2018    Procedure: AMPUTATE TOE(S);  Left third toe amputation;  Surgeon: Ayaka Azevedo DPM, Podiatry/Foot and Ankle Surgery;  Location: [facility] OR     ARTHROPLASTY KNEE Right 12/07/2018    Procedure: Right total knee arthroplasty;  Surgeon: Issa Cunha MD;  Location: [facility] OR     COLONOSCOPY  03/09/2013    Procedure: COLONOSCOPY;  COLONOSCOPY;  Surgeon: Chau Hogan MD;  Location:  GI     CV HEART CATHETERIZATION WITH POSSIBLE INTERVENTION Left 03/05/2019    Procedure: Coronary Angiogram;  Surgeon: Alexei  "Nas MORALES MD;  Location:  HEART CARDIAC CATH LAB     Diastasis recti repair  1985     EXTRACAPSULAR CATARACT EXTRATION WITH INTRAOCULAR LENS IMPLANT Left 03/13/2017     EXTRACAPSULAR CATARACT EXTRATION WITH INTRAOCULAR LENS IMPLANT Right      FOOT SURGERY  04/2013    cyst removal      HERNIA REPAIR  07/13/2004    ventral      ORIF Shoulder Left      Ventral hernia repair NOS  1987           CURRENT MEDICATIONS:    Current Facility-Administered Medications   Medication     ceFEPIme (MAXIPIME) 1g vial to attach to  ml bag for ADULTS or NS 50 ml bag for PEDS     furosemide (LASIX) injection 20 mg     oxyCODONE (ROXICODONE) tablet 10 mg     vancomycin 1750 mg in 0.9% NaCl 500 ml intermittent infusion 1,750 mg     Current Outpatient Medications   Medication     amLODIPine (NORVASC) 5 MG tablet     aspirin (ASA) 81 MG EC tablet     atorvastatin (LIPITOR) 40 MG tablet     celecoxib (CELEBREX) 200 MG capsule     clindamycin (CLEOCIN) 300 MG capsule     colchicine (COLCYRS) 0.6 MG tablet     Ferrous Gluconate 240 (27 Fe) MG TABS     finasteride (PROSCAR) 5 MG tablet     fluticasone (FLONASE) 50 MCG/ACT nasal spray     HYDROcodone-acetaminophen (NORCO) 5-325 MG tablet     ibuprofen (ADVIL/MOTRIN) 200 MG tablet     isosorbide mononitrate (IMDUR) 30 MG 24 hr tablet     labetalol (NORMODYNE) 200 MG tablet     Lidocaine (LIDOCARE) 4 % Patch     lisinopril (ZESTRIL) 10 MG tablet     loratadine (CLARITIN) 10 MG tablet     multivitamin w/minerals (THERA-VIT-M) tablet     Semaglutide (RYBELSUS) 7 MG TABS     tamsulosin (FLOMAX) 0.4 MG 24 hr capsule     tiZANidine (ZANAFLEX) 2 MG tablet     torsemide (DEMADEX) 20 MG tablet     blood glucose (NO BRAND SPECIFIED) lancets standard     blood glucose (NO BRAND SPECIFIED) test strip     blood glucose monitoring (NO BRAND SPECIFIED) meter device kit         ALLERGIES:  Allergies   Allergen Reactions     Penicillins Anaphylaxis     \"anaphylactic\"     Sulfa Drugs      \"itchy rash, " "swelling of face and hands\"     Ancef [Cefazolin] Rash       FAMILY HISTORY:  Family History   Problem Relation Age of Onset     Family History Negative Mother          88 yo     Cancer Father          74 yo brain     Diabetes Maternal Grandfather          89 yo     Alcohol/Drug Paternal Grandfather              Colon Cancer No family hx of        SOCIAL HISTORY:   Social History     Socioeconomic History     Marital status:    Occupational History     Employer: SELF   Tobacco Use     Smoking status: Former Smoker     Packs/day: 0.00     Quit date: 3/17/1993     Years since quittin.4     Smokeless tobacco: Never Used   Substance and Sexual Activity     Alcohol use: Not Currently     Drug use: No     Sexual activity: Never       VITALS:  /67   Pulse 97   Temp 97.6  F (36.4  C) (Oral)   Resp 20   SpO2 98%     PHYSICAL EXAM    Constitutional: Well developed, Well nourished, NAD.  HENT: Normocephalic, Atraumatic. Neck Supple.  Eyes: EOMI, Conjunctiva normal.  Respiratory: Breathing comfortably on room air. Speaks full sentences easily. Lungs clear to ascultation.  Cardiovascular: Normal heart rate, Regular rhythm. No peripheral edema.  Abdomen: Soft  Musculoskeletal: There is erythema warmth and cellulitic change of the right lower extremity distal to the knee.  There is a shallow ulceration on the plantar aspect of the right great toe pad.  No crepitance or fluctuance.  Integument: Warm, Dry.  Neurologic: Alert & awake, Normal motor function, Normal sensory function, No focal deficits noted.   Psychiatric: Cooperative. Affect appropriate.     LAB:  All pertinent labs reviewed and interpreted.  Labs Ordered and Resulted from Time of ED Arrival to Time of ED Departure   BASIC METABOLIC PANEL - Abnormal       Result Value    Sodium 138      Potassium 5.2 (*)     Chloride 106      Carbon Dioxide (CO2) 21 (*)     Anion Gap 11      Urea Nitrogen 23      Creatinine 1.57 " (*)     Calcium 9.3      Glucose 100      GFR Estimate 45 (*)    CRP INFLAMMATION - Abnormal    CRP 4.7 (*)    CBC WITH PLATELETS AND DIFFERENTIAL - Abnormal    WBC Count 12.2 (*)     RBC Count 3.81 (*)     Hemoglobin 10.9 (*)     Hematocrit 34.0 (*)     MCV 89      MCH 28.6      MCHC 32.1      RDW 16.0 (*)     Platelet Count 441      % Neutrophils 69      % Lymphocytes 20      % Monocytes 7      % Eosinophils 1      % Basophils 1      % Immature Granulocytes 2      NRBCs per 100 WBC 0      Absolute Neutrophils 8.4 (*)     Absolute Lymphocytes 2.4      Absolute Monocytes 0.9      Absolute Eosinophils 0.2      Absolute Basophils 0.1      Absolute Immature Granulocytes 0.3      Absolute NRBCs 0.0     COVID-19 VIRUS (CORONAVIRUS) BY PCR - Normal    SARS CoV2 PCR Negative     BLOOD CULTURE   BLOOD CULTURE       RADIOLOGY:  Reviewed all pertinent imaging. Please see official radiology report.  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 No acute fracture or dislocation identified. Hindfoot and midfoot joint spaces are unchanged. Persistent dorsal foot soft tissue swelling    with heel spur. Great toe soft tissue swelling with likely soft tissue ulcer distally and plantar. Nothing specific radiographically for great toe osteomyelitis.            Antonio Hwang M.D.  Emergency Medicine  [facility] EMERGENCY ROOM  [address] [facility] [address] [phone]  Dept: [phone]      Antonio Hwang MD  08/25/22 2024

## 2022-08-25 NOTE — ED TRIAGE NOTES
Pt reports he has been on clindamycin antibiotics for 7 days for right leg cellulitis.  Pt states today significantly more red and swollen.

## 2022-08-26 ENCOUNTER — APPOINTMENT (OUTPATIENT)
Dept: ULTRASOUND IMAGING | Facility: CLINIC | Age: 77
DRG: 603 | End: 2022-08-26
Attending: PODIATRIST
Payer: COMMERCIAL

## 2022-08-26 LAB
ANION GAP SERPL CALCULATED.3IONS-SCNC: 7 MMOL/L (ref 5–18)
BUN SERPL-MCNC: 20 MG/DL (ref 8–28)
CALCIUM SERPL-MCNC: 9.3 MG/DL (ref 8.5–10.5)
CHLORIDE BLD-SCNC: 107 MMOL/L (ref 98–107)
CO2 SERPL-SCNC: 22 MMOL/L (ref 22–31)
CREAT SERPL-MCNC: 1.35 MG/DL (ref 0.7–1.3)
ERYTHROCYTE [DISTWIDTH] IN BLOOD BY AUTOMATED COUNT: 15.9 % (ref 10–15)
GFR SERPL CREATININE-BSD FRML MDRD: 54 ML/MIN/1.73M2
GLUCOSE BLD-MCNC: 174 MG/DL (ref 70–125)
GLUCOSE BLDC GLUCOMTR-MCNC: 112 MG/DL (ref 70–99)
GLUCOSE BLDC GLUCOMTR-MCNC: 167 MG/DL (ref 70–99)
HCT VFR BLD AUTO: 31.3 % (ref 40–53)
HGB BLD-MCNC: 9.9 G/DL (ref 13.3–17.7)
MCH RBC QN AUTO: 28.1 PG (ref 26.5–33)
MCHC RBC AUTO-ENTMCNC: 31.6 G/DL (ref 31.5–36.5)
MCV RBC AUTO: 89 FL (ref 78–100)
PLATELET # BLD AUTO: 408 10E3/UL (ref 150–450)
POTASSIUM BLD-SCNC: 4.8 MMOL/L (ref 3.5–5)
RBC # BLD AUTO: 3.52 10E6/UL (ref 4.4–5.9)
SODIUM SERPL-SCNC: 136 MMOL/L (ref 136–145)
WBC # BLD AUTO: 10.5 10E3/UL (ref 4–11)

## 2022-08-26 PROCEDURE — 250N000011 HC RX IP 250 OP 636: Performed by: HOSPITALIST

## 2022-08-26 PROCEDURE — 99223 1ST HOSP IP/OBS HIGH 75: CPT | Performed by: PODIATRIST

## 2022-08-26 PROCEDURE — 85027 COMPLETE CBC AUTOMATED: CPT | Performed by: FAMILY MEDICINE

## 2022-08-26 PROCEDURE — 250N000013 HC RX MED GY IP 250 OP 250 PS 637: Performed by: HOSPITALIST

## 2022-08-26 PROCEDURE — 258N000003 HC RX IP 258 OP 636: Performed by: HOSPITALIST

## 2022-08-26 PROCEDURE — 80048 BASIC METABOLIC PNL TOTAL CA: CPT | Performed by: HOSPITALIST

## 2022-08-26 PROCEDURE — 36415 COLL VENOUS BLD VENIPUNCTURE: CPT | Performed by: HOSPITALIST

## 2022-08-26 PROCEDURE — 99233 SBSQ HOSP IP/OBS HIGH 50: CPT | Performed by: FAMILY MEDICINE

## 2022-08-26 PROCEDURE — 250N000011 HC RX IP 250 OP 636: Performed by: FAMILY MEDICINE

## 2022-08-26 PROCEDURE — 120N000001 HC R&B MED SURG/OB

## 2022-08-26 PROCEDURE — 93922 UPR/L XTREMITY ART 2 LEVELS: CPT

## 2022-08-26 RX ORDER — DEXTROSE MONOHYDRATE 25 G/50ML
25-50 INJECTION, SOLUTION INTRAVENOUS
Status: DISCONTINUED | OUTPATIENT
Start: 2022-08-26 | End: 2022-08-29 | Stop reason: HOSPADM

## 2022-08-26 RX ORDER — HEPARIN SODIUM 5000 [USP'U]/.5ML
5000 INJECTION, SOLUTION INTRAVENOUS; SUBCUTANEOUS EVERY 12 HOURS
Status: DISCONTINUED | OUTPATIENT
Start: 2022-08-26 | End: 2022-08-29 | Stop reason: HOSPADM

## 2022-08-26 RX ORDER — NICOTINE POLACRILEX 4 MG
15-30 LOZENGE BUCCAL
Status: DISCONTINUED | OUTPATIENT
Start: 2022-08-26 | End: 2022-08-29 | Stop reason: HOSPADM

## 2022-08-26 RX ORDER — OXYCODONE HYDROCHLORIDE 5 MG/1
5 TABLET ORAL EVERY 4 HOURS PRN
Status: DISCONTINUED | OUTPATIENT
Start: 2022-08-26 | End: 2022-08-29 | Stop reason: HOSPADM

## 2022-08-26 RX ADMIN — AMLODIPINE BESYLATE 5 MG: 5 TABLET ORAL at 09:05

## 2022-08-26 RX ADMIN — HEPARIN SODIUM 5000 UNITS: 5000 INJECTION, SOLUTION INTRAVENOUS; SUBCUTANEOUS at 18:05

## 2022-08-26 RX ADMIN — ACETAMINOPHEN 975 MG: 325 TABLET ORAL at 21:01

## 2022-08-26 RX ADMIN — ACETAMINOPHEN 975 MG: 325 TABLET ORAL at 06:18

## 2022-08-26 RX ADMIN — OXYCODONE HYDROCHLORIDE 10 MG: 5 TABLET ORAL at 18:37

## 2022-08-26 RX ADMIN — ATORVASTATIN CALCIUM 40 MG: 40 TABLET, FILM COATED ORAL at 21:01

## 2022-08-26 RX ADMIN — LABETALOL HYDROCHLORIDE 200 MG: 100 TABLET, FILM COATED ORAL at 09:04

## 2022-08-26 RX ADMIN — ACETAMINOPHEN 975 MG: 325 TABLET ORAL at 13:44

## 2022-08-26 RX ADMIN — ASPIRIN 81 MG: 81 TABLET, COATED ORAL at 09:05

## 2022-08-26 RX ADMIN — FINASTERIDE 5 MG: 5 TABLET, FILM COATED ORAL at 09:04

## 2022-08-26 RX ADMIN — TAMSULOSIN HYDROCHLORIDE 0.4 MG: 0.4 CAPSULE ORAL at 09:04

## 2022-08-26 RX ADMIN — VANCOMYCIN HYDROCHLORIDE 1250 MG: 5 INJECTION, POWDER, LYOPHILIZED, FOR SOLUTION INTRAVENOUS at 21:08

## 2022-08-26 RX ADMIN — TIZANIDINE 4 MG: 4 TABLET ORAL at 21:00

## 2022-08-26 RX ADMIN — CEFEPIME HYDROCHLORIDE 2 G: 2 INJECTION, POWDER, FOR SOLUTION INTRAVENOUS at 15:45

## 2022-08-26 RX ADMIN — ISOSORBIDE MONONITRATE 30 MG: 30 TABLET, EXTENDED RELEASE ORAL at 09:04

## 2022-08-26 RX ADMIN — LABETALOL HYDROCHLORIDE 200 MG: 100 TABLET, FILM COATED ORAL at 21:00

## 2022-08-26 ASSESSMENT — ACTIVITIES OF DAILY LIVING (ADL)
ADLS_ACUITY_SCORE: 22
DEPENDENT_IADLS:: INDEPENDENT
ADLS_ACUITY_SCORE: 22
ADLS_ACUITY_SCORE: 23
ADLS_ACUITY_SCORE: 23
ADLS_ACUITY_SCORE: 22

## 2022-08-26 NOTE — CONSULTS
Consultation - Foot and Ankle/Podiatry  Lance Ibrahim,  1945, MRN [id number]    Admitting Dx: Cellulitis of right lower extremity [L03.115]    PCP: Anh Spivey, [phone]   Code status:  Full Code       Extended Emergency Contact Information  Primary Emergency Contact: Omari Ireland  Address: [address] [place] [address] [facility]  Mobile Phone: [phone]  Relation: Stepchild  Secondary Emergency Contact: ( 12/3/2021) Tiffanie Ireland  Address: [address] [place] [address] [place]  Home Phone: [phone]  Mobile Phone: [phone]  Relation: Spouse  Preferred language: English       ASSESSMENT   Diabetic Foot Ulceration right hallux   Principal Problem:    Cellulitis of right lower extremity  Active Problems:    Hypertension    Type II diabetes mellitus (H)    CKD (chronic kidney disease) stage 3, GFR 30-59 ml/min (H)       PLAN   -There is diffuse erythema right leg, no open lesions or fluctuance. Ulceration plantar medial right hallux with granular tissue, no increased depth, no erythema. WBC 10.5, trending down. Afebrile.     -I reviewed the patient's right foot x-rays which are negative for obvious bony destruction. Advanced DJD 1st MPJ.     -Based on the above, I do not recommend surgical intervention at this time. Continue with IV antibiotics until right leg cellulitis resolves. Consider discharge on oral antibiotics x10 days.     -WOC nurse to manage right hallux ulceration while in-house.     -Medical management per hospitalist. Continue with zosyn/cefepime. Recommend patient follow-up with Dr. Azevedo for right hallux ulceration upon discharge.     Thank you for the consultation. I will sign off at this time. Please contact me with questions.     Nakul Salazar DPM  [facility] Podiatry/Foot & Ankle Surgery  On-Call:  [phone]  ______________________________________________________________________        Reason For Consult: Cellulitis of right lower extremity  Diabetic Foot Ulceration right hallux        HPI    I have been requested by Dr. Cartagena to evaluate Lance Ibrahim who is a 77 year old year old male for the above. The patient was admitted to [facility] for right leg cellulitis. The patient states he has been seeing Dr. Azevedo for a right hallux wound for the past 14 months. He recently noticed increasing redness and swelling in his right leg and became concerned. Currently walking in gym shoes. MRI right foot obtained on 7/9/22 was negative for osteomyelitis. Right foot x-rays obtained on 8/25 was read as negative for osteomyelitis. PMH significant for DM2.          Medical History  Past Medical History:   Diagnosis Date     Arthritis     osteoarthritis knees     BPH      CAD (coronary artery disease)     subtotal occlusion in the small distal LAD      Cardiomyopathy      Cerebral artery occlusion with cerebral infarction     TIA 1993, no residual     Chronic kidney disease      Chronic rhinitis      HTN (hypertension)      Hyperlipidemia      Kidney stone      PVD (peripheral vascular disease)      Sleep apnea     doesn't use cpap     TIA (transient ischaemic attack) 1993     Type 2 diabetes mellitus - 11/08/2018     Ureteral stone      Surgical History  He  has a past surgical history that includes Diastasis recti repair (1985); Ventral hernia repair NOS (1987); ORIF Shoulder (Left); Colonoscopy (03/09/2013); hernia repair (07/13/2004); Foot surgery (04/2013); Amputate toe(s) (Left, 10/15/2018); Arthroplasty knee (Right, 12/07/2018); Heart Catheterization with Possible Intervention (Left, 03/05/2019); Extracapsular cataract extration with intraocular lens implant (Left, 03/13/2017); and Extracapsular cataract extration with intraocular lens implant (Right).   Social History  Reviewed, and he  reports that he quit smoking  "about 29 years ago. He smoked 0.00 packs per day. He has never used smokeless tobacco. He reports previous alcohol use. He reports that he does not use drugs.   Allergies  Allergies   Allergen Reactions     Penicillins Anaphylaxis     8/25/22 - tolerated cefepime      Sulfa Drugs      \"itchy rash, swelling of face and hands\"     Ancef [Cefazolin] Rash    Family History  family history includes Alcohol/Drug in his paternal grandfather; Cancer in his father; Diabetes in his maternal grandfather; Family History Negative in his mother.  family history not pertinent to presenting problem.    Psychosocial Needs  Social History     Social History Narrative     Not on file     Additional psychosocial needs reviewed per nursing assessment.       Prior to Admission Medications   Medications Prior to Admission   Medication Sig Dispense Refill Last Dose     amLODIPine (NORVASC) 5 MG tablet Take by mouth every 24 hours   8/25/2022 at Unknown time     aspirin (ASA) 81 MG EC tablet Take by mouth every 24 hours   8/25/2022 at Unknown time     atorvastatin (LIPITOR) 40 MG tablet Take by mouth every 24 hours   8/25/2022 at Unknown time     celecoxib (CELEBREX) 200 MG capsule TAKE 1 CAPSULE BY MOUTH TWICE A DAY   8/25/2022 at Unknown time     clindamycin (CLEOCIN) 300 MG capsule Take 1 capsule (300 mg) by mouth 4 times daily for 10 days (Patient taking differently: Take 300 mg by mouth 4 times daily Started ten course on 8/19/22) 40 capsule 0 8/25/2022 at Unknown time     colchicine (COLCYRS) 0.6 MG tablet Take 1 tablet (0.6 mg) by mouth daily 30 tablet 1 8/25/2022 at Unknown time     Ferrous Gluconate 240 (27 Fe) MG TABS Take 1 tablet by mouth daily    8/25/2022 at Unknown time     finasteride (PROSCAR) 5 MG tablet Take by mouth every 24 hours   8/25/2022 at Unknown time     fluticasone (FLONASE) 50 MCG/ACT nasal spray Spray 1-2 sprays in nostril every 24 hours   8/25/2022 at Unknown time     HYDROcodone-acetaminophen (NORCO) 5-325 " MG tablet Take 1 tablet by mouth At Bedtime   8/24/2022 at Unknown time     ibuprofen (ADVIL/MOTRIN) 200 MG tablet Take 200 mg by mouth every 6 hours as needed   Past Week at Unknown time     isosorbide mononitrate (IMDUR) 30 MG 24 hr tablet Take 1 tablet (30 mg) by mouth daily 90 tablet 3 8/25/2022 at Unknown time     labetalol (NORMODYNE) 200 MG tablet Take 200 mg by mouth 2 times daily   8/25/2022 at Unknown time     Lidocaine (LIDOCARE) 4 % Patch Place 1 patch onto the skin every 24 hours To prevent lidocaine toxicity, patient should be patch free for 12 hrs daily. (Patient taking differently: Place 1 patch onto the skin every 24 hours To prevent lidocaine toxicity, patient should be patch free for 12 hrs daily.  To back every am)   Past Week at Unknown time     lisinopril (ZESTRIL) 10 MG tablet Take by mouth every 24 hours   8/25/2022 at Unknown time     loratadine (CLARITIN) 10 MG tablet Take by mouth every 24 hours   8/25/2022 at Unknown time     multivitamin w/minerals (THERA-VIT-M) tablet    8/25/2022 at Unknown time     Semaglutide (RYBELSUS) 7 MG TABS Take 7 mg by mouth daily 90 tablet 3 8/25/2022 at Unknown time     tamsulosin (FLOMAX) 0.4 MG 24 hr capsule Take 1 capsule by mouth daily.   8/25/2022 at Unknown time     tiZANidine (ZANAFLEX) 2 MG tablet Take 4 mg by mouth At Bedtime   8/24/2022 at Unknown time     torsemide (DEMADEX) 20 MG tablet Take 1 tablet (20 mg) by mouth daily 90 tablet 2 8/25/2022 at Unknown time     blood glucose (NO BRAND SPECIFIED) lancets standard Use to test blood sugar 2 times daily or as directed. 100 lancet 4      blood glucose (NO BRAND SPECIFIED) test strip Use to test blood sugar 2 times daily or as directed. 100 strip 4      blood glucose monitoring (NO BRAND SPECIFIED) meter device kit Use to test blood sugar 2 times daily or as directed. 1 kit 0           Imaging: reviewed images          Review of Systems:  All other systems negative in detail except what is noted in  HPI.  Physical Exam:  Temp:  [98.1  F (36.7  C)-98.2  F (36.8  C)] 98.1  F (36.7  C)  Pulse:  [76-83] 83  Resp:  [16-20] 16  BP: (151-153)/(69-72) 153/69  SpO2:  [93 %-97 %] 93 %    General appearance: Patient is alert and fully cooperative with history & exam.  No sign of distress is noted during the visit.  Psychiatric: Affect is pleasant & appropriate.  Patient appears motivated to improve health.  Respiratory: Breathing is regular & unlabored while sitting.  HEENT: Hearing is intact to spoken word.  Speech is clear.  No gross evidence of visual impairment that would impact ambulation.    Vascular: Dorsalis pedis and posterior tibial pulses are non-palpable bilateral. Diffuse edema right leg.  Dermatologic: Diffuse erythema right leg, no open lesions or fluctuance. Ulceration plantar medial right hallux with granular tissue, no increased depth, no erythema.   Neurologic: Diminished to light touch bilateral.   Musculoskeletal: Contracted digits bilateral.      BP (!) 153/69 (BP Location: Left arm)   Pulse 83   Temp 98.1  F (36.7  C) (Oral)   Resp 16   Ht 1.829 m (6')   Wt 107.5 kg (237 lb)   SpO2 93%   BMI 32.14 kg/m      BMI= Body mass index is 32.14 kg/m .    Pertinent Labs    [unfilled]       Recent Labs   Lab 08/26/22  0942 08/25/22  1746    138   CO2 22 21*   BUN 20 23       Lab Results   Component Value Date    ALT 16 06/16/2022    AST 13 06/16/2022    ALKPHOS 136 (H) 06/16/2022          Lab Results   Component Value Date    CRP 4.7 (H) 08/25/2022    CRP 84.2 (H) 07/28/2021    CRP 48.3 (H) 07/28/2020      [facility]     Pertinent Radiology  Radiology Results: Reviewed  US KAREN Doppler No Exercise 1-2 Levels Bilateral    Result Date: 8/26/2022  EXAM: RESTING ANKLE-BRACHIAL INDICES (ABIs) LOCATION: [facility] DATE/TIME: 8/26/2022 4:49 PM INDICATION: Peripheral arterial disease. Presurgical mapping. Diabetic. Previous smoker. Hypertension. COMPARISON: None. KAREN FINDINGS: RIGHT  Brachial: 135 Ankle (PT): 180 Index: 1.33 Ankle (DP): 184 Index: 1.36 Digit: 108 Index: 0.80 LEFT Brachial: -- Ankle (PT): 188 Index: 1.39 Ankle (DP): 187 Index: 1.39 Digit: 128 Index: 0.95 The right KAREN at rest is 1.36. The left KAREN at rest is 1.39.  WAVEFORMS: The dorsalis pedis and posterior tibial arteries are normal bilaterally.     IMPRESSION: 1.  RIGHT LOWER EXTREMITY: KAREN at rest is normal. 2.  LEFT LOWER EXTREMITY: KAREN at rest is normal.    XR Toe Right G/E 2 Views    Result Date: 8/25/2022  EXAM: XR TOE RIGHT G/E 2 VIEWS LOCATION: [facility] DATE/TIME: 8/25/2022 7:43 PM INDICATION: Great toe ulcer. Cellulitis. COMPARISON: 07/28/2021     IMPRESSION: Unchanged severe arthritis at the right first MTP and metatarsal sesamoid joints with dorsal spurring. No acute fracture or dislocation identified. Hindfoot and midfoot joint spaces are unchanged. Persistent dorsal foot soft tissue swelling with heel spur. Great toe soft tissue swelling with likely soft tissue ulcer distally and plantar. Nothing specific radiographically for great toe osteomyelitis.    US Lower Extremity Venous Duplex Right    Result Date: 8/17/2022  EXAM: US LOWER EXTREMITY VENOUS DUPLEX RIGHT LOCATION: [facility] DATE/TIME: 8/17/2022 9:36 AM INDICATION: Right leg swelling. COMPARISON: 02/17/2019. TECHNIQUE: Venous Duplex ultrasound of the right lower extremity with and without compression, augmentation and duplex. Color flow and spectral Doppler with waveform analysis performed. FINDINGS: Exam includes the common femoral, femoral, popliteal, and contralateral common femoral veins as well as segmentally visualized deep calf veins and greater saphenous vein. RIGHT: No deep vein thrombosis. No superficial thrombophlebitis. No popliteal cyst.     IMPRESSION: 1.  Negative right leg venous Doppler.

## 2022-08-26 NOTE — PLAN OF CARE
Goal Outcome Evaluation:  Patient is alert and oriented x 4, endorses pain in right lower extremity, which is swollen, warm and red. Managed by PRN and scheduled pain meds, with some effectiveness, given IV cefepime , ultra sound for LE negative for DVT, Vitally stable. Continue to monitor.

## 2022-08-26 NOTE — PROGRESS NOTES
Chart review completed per  protocol. Per chart review, patient lives alone at home. Seen twice in ED 8/17 and again 8/19 due to cellulitis of right lower extremity. Admitted 8/25/2022 due to cellulitis. Currently on IV Vancomycin- awaiting Podiatry consult. No ID consult yet. Was taking oral antibiotics per ED MD note 8/25 but infection not clearing.     CM to see. May need IV antibiotics at discharge.

## 2022-08-26 NOTE — PLAN OF CARE
Problem: Infection  Goal: Absence of Infection Signs and Symptoms  Outcome: Ongoing, Progressing     Problem: Plan of Care - These are the overarching goals to be used throughout the patient stay.    Goal: Absence of Hospital-Acquired Illness or Injury  Intervention: Identify and Manage Fall Risk  Recent Flowsheet Documentation  Taken 8/25/2022 2124 by Terence Kelly RN  Safety Promotion/Fall Prevention:   activity supervised   clutter free environment maintained   fall prevention program maintained   increased rounding and observation   mobility aid in reach   nonskid shoes/slippers when out of bed   patient and family education   room door open   safety round/check completed   Goal Outcome Evaluation:  The patient is vitally stable and afebrile, and he is receiving IV antibiotics. The patient has +2-+3 swelling and redness in his right leg. He has weak (+1), but palpable pulses in both feet, and he has baseline neuropathy, numbness and tingling, in both feet.

## 2022-08-26 NOTE — PLAN OF CARE
Goal Outcome Evaluation:  Patient is alert and oriented, denied pain this shift, right extremity is red, swollen and burning. He has an ortho consult placed, he is saline locked, calls appropriately.

## 2022-08-26 NOTE — CONSULTS
Chart review completed per  protocol. Per chart review, patient lives alone at home. Seen twice in ED 8/17 and again 8/19 due to cellulitis of right lower extremity. Admitted 8/25/2022 due to cellulitis. Currently on IV Vancomycin- awaiting Podiatry consult. No ID consult yet. Was taking oral antibiotics per ED MD note 8/25 but infection not clearing.     Care Management Initial Consult    General Information  Assessment completed with: Patient,    Type of CM/SW Visit: Initial Assessment    Primary Care Provider verified and updated as needed: Yes   Readmission within the last 30 days: no previous admission in last 30 days      Reason for Consult: discharge planning (may need IV antibiotics)  Advance Care Planning: Advance Care Planning Reviewed: present on chart, verified with patient, no concerns identified          Communication Assessment  Patient's communication style: spoken language (English or Bilingual)    Hearing Difficulty or Deaf: no   Wear Glasses or Blind: yes    Cognitive  Cognitive/Neuro/Behavioral: WDL                      Living Environment:   People in home: alone     Current living Arrangements: apartment, assisted living  Name of Facility: [facility]   Able to return to prior arrangements: yes       Family/Social Support:  Care provided by: self  Provides care for: no one  Marital Status:   Children (friends, assisted living staff)          Description of Support System: Supportive    Support Assessment: Adequate family and caregiver support    Current Resources:   Patient receiving home care services: No     Community Resources: None  Equipment currently used at home: cane, straight, walker, rolling, grab bar, tub/shower  Supplies currently used at home:  (glasses)    Employment/Financial:  Employment Status: retired        Financial Concerns:             Lifestyle & Psychosocial Needs:  Social Determinants of Health     Tobacco Use: Medium Risk     Smoking Tobacco Use:  Former Smoker     Smokeless Tobacco Use: Never Used   Alcohol Use: Not on file   Financial Resource Strain: Not on file   Food Insecurity: Not on file   Transportation Needs: Not on file   Physical Activity: Not on file   Stress: Not on file   Social Connections: Not on file   Intimate Partner Violence: Not on file   Depression: Not at risk     PHQ-2 Score: 0   Housing Stability: Not on file       Functional Status:  Prior to admission patient needed assistance:   Dependent ADLs:: Independent  Dependent IADLs:: Independent       Mental Health Status:          Chemical Dependency Status:                Values/Beliefs:  Spiritual, Cultural Beliefs, Sabianist Practices, Values that affect care: yes       Cultural/Sabianist Practices Patient Routinely Participates In: prayer       Additional Information:  8/26/2022 Met with patient to review role of care management, progression of care and possible need for services at discharge, including OP services, home care, or skilled nursing care. Patient alert, oriented and engaged in the conversation. Patient currently living in an apartment at [facility] ([facility] He receives housekeeping services 2 x week and meals; otherwise he is independent with Activities of daily living (ADLs) and still drives. He drove himself to the hospital and his vehicle is in the hospital parking.    Discussed patient may need IV antibiotics after discharge pending MD plan. Awaiting Podiatry consult. Patient has been following with Podiatry due to scabbed sore on the ventral aspect of his right big toe so he believes this is the source of his cellulitis in his right leg.  Discussed options - patient would prefer outpatient therapy instead of home infusion.     CM will continue to follow.

## 2022-08-26 NOTE — PROGRESS NOTES
Chart reviewed. Podiatry consult pending. IV antibiotics. Discharged from [facility] to TCU in 12/2021. Would benefit from full CM assessment- order placed.

## 2022-08-26 NOTE — PHARMACY-VANCOMYCIN DOSING SERVICE
Pharmacy Vancomycin Initial Note  Date of Service 2022  Patient's  1945  77 year old, male    Indication: Skin and Soft Tissue Infection    Current estimated CrCl = Estimated Creatinine Clearance: 49.1 mL/min (A) (based on SCr of 1.57 mg/dL (H)).    Creatinine for last 3 days  2022:  5:46 PM Creatinine 1.57 mg/dL    Recent Vancomycin Level(s) for last 3 days  No results found for requested labs within last 72 hours.      Vancomycin IV Administrations (past 72 hours)                   vancomycin 1750 mg in 0.9% NaCl 500 ml intermittent infusion 1,750 mg (mg) 1,750 mg Given 22                Nephrotoxins and other renal medications (From now, onward)    Start     Dose/Rate Route Frequency Ordered Stop    22  vancomycin 1250 mg in 0.9% NaCl 250 mL intermittent infusion 1,250 mg         1,250 mg  over 90 Minutes Intravenous EVERY 24 HOURS 22  furosemide (LASIX) injection 20 mg         20 mg  over 1-3 Minutes Intravenous ONCE 22 19422 190  vancomycin 1750 mg in 0.9% NaCl 500 ml intermittent infusion 1,750 mg         1,750 mg  over 2 Hours Intravenous ONCE 22 185            Contrast Orders - past 72 hours (72h ago, onward)    None          Insight[employer] Prediction of Planned Initial Vancomycin Regimen  Loading dose: 1750 mg IV 22  Regimen: 1250 mg IV every 24 hours.  Start time: 21:00 on 2022  Exposure target: AUC24 (range)400-600 mg/L.hr   AUC24,ss: 478 mg/L.hr  Probability of AUC24 > 400: 70 %  Ctrough,ss: 15.3 mg/L  Probability of Ctrough,ss > 20: 26 %  Probability of nephrotoxicity (Lodise LATISHA ): 11 %        Plan:  1. Start vancomycin  1250 mg IV q24h.   2. Received loading dose of 1750mg x 1 on   3. Vancomycin monitoring method: AUC  4. Vancomycin therapeutic monitoring goal: 400-600 mg*h/L  5. Pharmacy will check vancomycin levels as appropriate in 1-3 Days.    6. Serum creatinine levels will be  ordered daily for the first week of therapy and at least twice weekly for subsequent weeks.      Antonio Orourke RP

## 2022-08-26 NOTE — H&P
"Hospital Medicine Service History and Physical  [facility]: [facility]    Lance Ibrahim is a 77 year old male who  has a past medical history of Arthritis, BPH, CAD (coronary artery disease), Cardiomyopathy, Cerebral artery occlusion with cerebral infarction, Chronic kidney disease, Chronic rhinitis, HTN (hypertension), Hyperlipidemia, Kidney stone, PVD (peripheral vascular disease), Sleep apnea, TIA (transient ischaemic attack) (1993), Type 2 diabetes mellitus - (11/08/2018), and Ureteral stone.   Chief Complaint: leg swelling    Assessment and Plan  Cellulitis  Diabetic foot ulcer  Agree with ED Vanc + cefepime, if allergy, stop cefepime  Consult podiatry  Recheck DVT U/S  Prn oxycodone, may need for US procedure  Elevate right leg  XR foot pending    Acute kidney injury  Baseline Cr 1.2, now 1.6  Trial lasix x1, am labs  Hold NSAIDS & ACE-I    T2DM  Not on insulin, continue diet control  semaglutide reordered    HFpEF  LV 55%, RV pressures elevated on last echo 2020  Lasix as above    Essential HTN  BP controlled now, PTA meds reordered    Gout  Does not appear to be acute flare    Normocytic anemia  No reported blood loss    Hyperkalemia  Mildly elevated 5.2  Give lasix as above    DVTP: Pending ultrasound results  Code Status: Prior  Disposition: Inpatient   Estimated body mass index is 31.19 kg/m  as calculated from the following:    Height as of 8/19/22: 1.829 m (6').    Weight as of 8/19/22: 104.3 kg (230 lb).    History of Present Illness  Lance Ibrahim who sees podiatry for chronic right foot ulcer presents with right leg erythema and swelling.  He had been managing for about 7 days on clindamycin in the outpatient setting with minimal response.  Because of the pain he has been less ambulatory and has mostly just been lying down over the past few days.  Over the past 24 hours his leg is \"blown up\" with increased erythema and swelling.  Denies associated systemic symptoms.  Denies history of " VTE  Retired banker. Recently .   ROS + swelling, erythema, leg pain  ROS -chest pain, shortness of breath, abdominal pain, nausea, vomiting  All other systems reviewed and are negative    In the ED, patient is afebrile, hemodynamically stable  Leukocytosis 12.2, hemoglobin 10.9, CRP 4.7, mild electrolyte disturbance, creatinine 1.6    Physical exam:  Appears NAD in the bed  Anicteric conjunctiva, PERRL, glasses  Moist mucous membranes, intact dentition  Trachea midline, unable to assess JVD  Clear to auscultation bilaterally, Respiratory effort normal on room air  Regular rate and rhythm, no murmur  Abdomen soft, nondistended, nontender  Right lower extremity edema, erythema extending to the distal knee/proximal tibia edema, clubbing of nails absent  Skin normal temperature, dry  Normal affect, alert  Vital signs reviewed by me    Wt Readings from Last 4 Encounters:   08/19/22 104.3 kg (230 lb)   08/02/22 110.7 kg (244 lb)   07/13/22 110.7 kg (244 lb)   06/22/22 110.7 kg (244 lb)        reports that he quit smoking about 29 years ago. He smoked 0.00 packs per day. He has never used smokeless tobacco. He reports previous alcohol use. He reports that he does not use drugs.  family history includes Alcohol/Drug in his paternal grandfather; Cancer in his father; Diabetes in his maternal grandfather; Family History Negative in his mother.   has a past surgical history that includes Diastasis recti repair (1985); Ventral hernia repair NOS (1987); ORIF Shoulder (Left); Colonoscopy (03/09/2013); hernia repair (07/13/2004); Foot surgery (04/2013); Amputate toe(s) (Left, 10/15/2018); Arthroplasty knee (Right, 12/07/2018); Heart Catheterization with Possible Intervention (Left, 03/05/2019); Extracapsular cataract extration with intraocular lens implant (Left, 03/13/2017); and Extracapsular cataract extration with intraocular lens implant (Right).   Allergies   Allergen Reactions     Penicillins Anaphylaxis      "\"anaphylactic\"     Sulfa Drugs      \"itchy rash, swelling of face and hands\"     Ancef [Cefazolin] Rash       Luisito Cartagena MD, MPH  [facility]   Phone: #[phone]    "

## 2022-08-26 NOTE — PROGRESS NOTES
[facility] MEDICINE  PROGRESS NOTE     Code Status: Full Code       Identification/Summary:   77 year old male who with a past medical history of BPH, CAD, Cardiomyopathy, Cerebral artery occlusion with CVA, Chronic kidney disease, HTN, Hyperlipidemia, Kidney stone, PVD, Sleep apnea, TIA, Type 2 diabetes mellitus.   8/25 presents with right lower extremity cellulitis unresponsive to doxycycline therapy.  Admitted on vancomycin and IV cefepime.  Slowly responding.     Assessment and Plan  Right lower extremity cellulitis  Diabetic foot ulcer  Agree with ED Vanc + cefepime, if allergy, stop cefepime  Consult podiatry pending.  Right lower extremity ultrasound negative for DVT on 8/17/2022.  Normal bilateral lower extremity ABIs.  Prn oxycodone.  Right foot x-ray negative for evidence of osteomyelitis.  Keep right leg elevated.  Continue intravenous vancomycin and cefepime.  Acute kidney injury  Hyperkalemia  Baseline Cr 1.2, now 1.6  Given Lasix x1.  Recheck creatinine 1.35.  Admission potassium 5.2.  Recheck 4.8.  Hold NSAIDS and his home lisinopril.  Recheck BMP on 8/27.  Leukocytosis  Admission white count 12.  Now down to 10.  No further checks indicated.  T2DM  Not on insulin, continue home semaglutide reordered.  6/16/2022 A1c 8.6.  Follow sugars 4 times a day.  Ordered insulin sliding scale.  Chronic HFpEF  LV 55%, RV pressures elevated on last echo 2020  Lasix as above  Essential HTN  Coronary artery disease  BP controlled now, PTA meds reordered  Gout  Does not appear to be acute flare  Normocytic anemia  No reported blood loss    COVID-19 PCR negative from 8/25/2022  Noted.  Standard precautions.  Anticoagulation   Continue home aspirin 81 mg daily.  Ordered subcutaneous heparin per protocol.    Fluids: Saline lock  Pain meds: Tylenol and oxycodone as needed  Therapy: na  Barnes:Not present  Current Diet  Orders Placed This Encounter      Moderate Consistent Carb (60 g CHO  per Meal) Diet    Supplements  None    Barriers to Discharge: IV antibiotics    Disposition: Hopeful discharge in 2 days    Interval History/Subjective:  Patient had some concerns about initial improvement to his leg but now getting a little bit worse.  Complains of increased pain.  No chest pain.  No shortness of breath.  No nausea or vomiting.  No lightheadedness or dizziness.  Questions answered to verbalized satisfaction.    Physical Exam/Objective:  Temp:  [97.6  F (36.4  C)-98.2  F (36.8  C)] 98.1  F (36.7  C)  Pulse:  [76-97] 83  Resp:  [16-20] 16  BP: (124-153)/(67-72) 153/69  SpO2:  [93 %-98 %] 93 %  Wt Readings from Last 4 Encounters:   08/25/22 107.5 kg (237 lb)   08/19/22 104.3 kg (230 lb)   08/02/22 110.7 kg (244 lb)   07/13/22 110.7 kg (244 lb)     Body mass index is 32.14 kg/m .    Constitutional: awake, alert, cooperative, no apparent distress, and appears stated age  ENT: Normocephalic, without obvious abnormality, atraumatic, external ears without lesions, oral pharynx with moist mucous membranes, tonsils without erythema or exudates, gums normal and good dentition.  Respiratory: No increased work of breathing, good air exchange, clear to auscultation bilaterally, no crackles or wheezing  Cardiovascular: Normal apical impulse, regular rate and rhythm, normal S1 and S2, no S3 or S4, and no murmur noted  GI: No scars, normal bowel sounds, soft, non-distended, non-tender, no masses palpated, no hepatosplenomegally  Skin: Right lower extremity erythema noted otherwise normal skin color, texture, turgor, no redness, warmth, or swelling, and no rashes  Musculoskeletal: There is no redness, warmth, or swelling of the joints.  Motor strength is 5 out of 5 all extremities bilaterally.  Tone is normal.  Does have 1+ pitting edema of the right lower extremity associated with his erythema.  Neurologic: Cranial nerves II-XII are grossly intact. Sensory:  Sensory intact  Neuropsychiatric: General: normal, calm  and normal eye contact Level of consciousness: alert / normal Affect: normal Orientation: oriented to self, place, time and situation Memory and insight: normal, memory for past and recent events intact and thought process normal      Medications:   Personally Reviewed.  Medications       acetaminophen  975 mg Oral Q8H     amLODIPine  5 mg Oral Daily     aspirin  81 mg Oral Daily     atorvastatin  40 mg Oral QPM     ceFEPIme (MAXIPIME) IV  2 g Intravenous Q24H     finasteride  5 mg Oral Daily     isosorbide mononitrate  30 mg Oral Daily     labetalol  200 mg Oral BID     Semaglutide  7 mg Oral Daily     sodium chloride (PF)  3 mL Intracatheter Q8H     tamsulosin  0.4 mg Oral Daily     tiZANidine  4 mg Oral At Bedtime     vancomycin  1,250 mg Intravenous Q24H       Data reviewed today: I personally reviewed all new medications, labs, imaging/diagnostics reports over the past 24 hours. Pertinent findings include:    Imaging:   Recent Results (from the past 24 hour(s))   XR Toe Right G/E 2 Views    Narrative    EXAM: XR TOE RIGHT G/E 2 VIEWS  LOCATION: [facility]  DATE/TIME: 8/25/2022 7:43 PM    INDICATION: Great toe ulcer. Cellulitis.  COMPARISON: 07/28/2021      Impression    IMPRESSION: Unchanged severe arthritis at the right first MTP and metatarsal sesamoid joints with dorsal spurring. No acute fracture or dislocation identified. Hindfoot and midfoot joint spaces are unchanged. Persistent dorsal foot soft tissue swelling   with heel spur. Great toe soft tissue swelling with likely soft tissue ulcer distally and plantar. Nothing specific radiographically for great toe osteomyelitis.       Labs:  XR Toe Right G/E 2 Views   Final Result   IMPRESSION: Unchanged severe arthritis at the right first MTP and metatarsal sesamoid joints with dorsal spurring. No acute fracture or dislocation identified. Hindfoot and midfoot joint spaces are unchanged. Persistent dorsal foot soft tissue swelling     with heel spur. Great toe soft tissue swelling with likely soft tissue ulcer distally and plantar. Nothing specific radiographically for great toe osteomyelitis.      US KAREN Doppler No Exercise 1-2 Levels Bilateral    (Results Pending)     Recent Results (from the past 24 hour(s))   Extra Red Top Tube    Collection Time: 08/25/22  5:46 PM   Result Value Ref Range    Hold Specimen JIC    Extra Green Top (Lithium Heparin) Tube    Collection Time: 08/25/22  5:46 PM   Result Value Ref Range    Hold Specimen JIC    Extra Purple Top Tube    Collection Time: 08/25/22  5:46 PM   Result Value Ref Range    Hold Specimen JIC    Basic metabolic panel    Collection Time: 08/25/22  5:46 PM   Result Value Ref Range    Sodium 138 136 - 145 mmol/L    Potassium 5.2 (H) 3.5 - 5.0 mmol/L    Chloride 106 98 - 107 mmol/L    Carbon Dioxide (CO2) 21 (L) 22 - 31 mmol/L    Anion Gap 11 5 - 18 mmol/L    Urea Nitrogen 23 8 - 28 mg/dL    Creatinine 1.57 (H) 0.70 - 1.30 mg/dL    Calcium 9.3 8.5 - 10.5 mg/dL    Glucose 100 70 - 125 mg/dL    GFR Estimate 45 (L) >60 mL/min/1.73m2   CRP inflammation    Collection Time: 08/25/22  5:46 PM   Result Value Ref Range    CRP 4.7 (H) 0.0 - <0.8 mg/dL   CBC with platelets and differential    Collection Time: 08/25/22  5:46 PM   Result Value Ref Range    WBC Count 12.2 (H) 4.0 - 11.0 10e3/uL    RBC Count 3.81 (L) 4.40 - 5.90 10e6/uL    Hemoglobin 10.9 (L) 13.3 - 17.7 g/dL    Hematocrit 34.0 (L) 40.0 - 53.0 %    MCV 89 78 - 100 fL    MCH 28.6 26.5 - 33.0 pg    MCHC 32.1 31.5 - 36.5 g/dL    RDW 16.0 (H) 10.0 - 15.0 %    Platelet Count 441 150 - 450 10e3/uL    % Neutrophils 69 %    % Lymphocytes 20 %    % Monocytes 7 %    % Eosinophils 1 %    % Basophils 1 %    % Immature Granulocytes 2 %    NRBCs per 100 WBC 0 <1 /100    Absolute Neutrophils 8.4 (H) 1.6 - 8.3 10e3/uL    Absolute Lymphocytes 2.4 0.8 - 5.3 10e3/uL    Absolute Monocytes 0.9 0.0 - 1.3 10e3/uL    Absolute Eosinophils 0.2 0.0 - 0.7 10e3/uL     Absolute Basophils 0.1 0.0 - 0.2 10e3/uL    Absolute Immature Granulocytes 0.3 <=0.4 10e3/uL    Absolute NRBCs 0.0 10e3/uL   Blood Culture Peripheral Blood    Collection Time: 08/25/22  6:56 PM    Specimen: Peripheral Blood   Result Value Ref Range    Culture No growth after 12 hours    Blood Culture Peripheral Blood    Collection Time: 08/25/22  6:56 PM    Specimen: Peripheral Blood   Result Value Ref Range    Culture No growth after 12 hours    Asymptomatic COVID-19 Virus (Coronavirus) by PCR Nasopharyngeal    Collection Time: 08/25/22  6:56 PM    Specimen: Nasopharyngeal; Swab   Result Value Ref Range    SARS CoV2 PCR Negative Negative   Basic metabolic panel    Collection Time: 08/26/22  9:42 AM   Result Value Ref Range    Sodium 136 136 - 145 mmol/L    Potassium 4.8 3.5 - 5.0 mmol/L    Chloride 107 98 - 107 mmol/L    Carbon Dioxide (CO2) 22 22 - 31 mmol/L    Anion Gap 7 5 - 18 mmol/L    Urea Nitrogen 20 8 - 28 mg/dL    Creatinine 1.35 (H) 0.70 - 1.30 mg/dL    Calcium 9.3 8.5 - 10.5 mg/dL    Glucose 174 (H) 70 - 125 mg/dL    GFR Estimate 54 (L) >60 mL/min/1.73m2   CBC with platelets    Collection Time: 08/26/22  9:42 AM   Result Value Ref Range    WBC Count 10.5 4.0 - 11.0 10e3/uL    RBC Count 3.52 (L) 4.40 - 5.90 10e6/uL    Hemoglobin 9.9 (L) 13.3 - 17.7 g/dL    Hematocrit 31.3 (L) 40.0 - 53.0 %    MCV 89 78 - 100 fL    MCH 28.1 26.5 - 33.0 pg    MCHC 31.6 31.5 - 36.5 g/dL    RDW 15.9 (H) 10.0 - 15.0 %    Platelet Count 408 150 - 450 10e3/uL       Pending Labs:  Unresulted Labs Ordered in the Past 30 Days of this Admission     Date and Time Order Name Status Description    8/25/2022  6:41 PM Blood Culture Peripheral Blood Preliminary     8/25/2022  6:41 PM Blood Culture Peripheral Blood Preliminary           Skip Esquivel MD  [facility]  Phone: #[phone]

## 2022-08-26 NOTE — PHARMACY-ADMISSION MEDICATION HISTORY
Pharmacy Note - Admission Medication History    Pertinent Provider Information: potentially using two NSAIDS     ______________________________________________________________________    Prior To Admission (PTA) med list completed and updated in EMR.       PTA Med List   Medication Sig Last Dose     amLODIPine (NORVASC) 5 MG tablet Take by mouth every 24 hours 8/25/2022 at Unknown time     aspirin (ASA) 81 MG EC tablet Take by mouth every 24 hours 8/25/2022 at Unknown time     atorvastatin (LIPITOR) 40 MG tablet Take by mouth every 24 hours 8/25/2022 at Unknown time     celecoxib (CELEBREX) 200 MG capsule TAKE 1 CAPSULE BY MOUTH TWICE A DAY 8/25/2022 at Unknown time     clindamycin (CLEOCIN) 300 MG capsule Take 1 capsule (300 mg) by mouth 4 times daily for 10 days (Patient taking differently: Take 300 mg by mouth 4 times daily Started ten course on 8/19/22) 8/25/2022 at Unknown time     colchicine (COLCYRS) 0.6 MG tablet Take 1 tablet (0.6 mg) by mouth daily 8/25/2022 at Unknown time     Ferrous Gluconate 240 (27 Fe) MG TABS Take 1 tablet by mouth daily  8/25/2022 at Unknown time     finasteride (PROSCAR) 5 MG tablet Take by mouth every 24 hours 8/25/2022 at Unknown time     fluticasone (FLONASE) 50 MCG/ACT nasal spray Spray 1-2 sprays in nostril every 24 hours 8/25/2022 at Unknown time     HYDROcodone-acetaminophen (NORCO) 5-325 MG tablet Take 1 tablet by mouth At Bedtime 8/24/2022 at Unknown time     ibuprofen (ADVIL/MOTRIN) 200 MG tablet Take 200 mg by mouth every 6 hours as needed Past Week at Unknown time     isosorbide mononitrate (IMDUR) 30 MG 24 hr tablet Take 1 tablet (30 mg) by mouth daily 8/25/2022 at Unknown time     labetalol (NORMODYNE) 200 MG tablet Take 200 mg by mouth 2 times daily 8/25/2022 at Unknown time     Lidocaine (LIDOCARE) 4 % Patch Place 1 patch onto the skin every 24 hours To prevent lidocaine toxicity, patient should be patch free for 12 hrs daily. (Patient taking differently: Place 1  patch onto the skin every 24 hours To prevent lidocaine toxicity, patient should be patch free for 12 hrs daily.  To back every am) Past Week at Unknown time     lisinopril (ZESTRIL) 10 MG tablet Take by mouth every 24 hours 8/25/2022 at Unknown time     loratadine (CLARITIN) 10 MG tablet Take by mouth every 24 hours 8/25/2022 at Unknown time     multivitamin w/minerals (THERA-VIT-M) tablet  8/25/2022 at Unknown time     Semaglutide (RYBELSUS) 7 MG TABS Take 7 mg by mouth daily 8/25/2022 at Unknown time     tamsulosin (FLOMAX) 0.4 MG 24 hr capsule Take 1 capsule by mouth daily. 8/25/2022 at Unknown time     tiZANidine (ZANAFLEX) 2 MG tablet Take 4 mg by mouth At Bedtime 8/24/2022 at Unknown time     torsemide (DEMADEX) 20 MG tablet Take 1 tablet (20 mg) by mouth daily 8/25/2022 at Unknown time       Information source(s): Patient and CareEverywhere/[facility]  Method of interview communication: in-person    Summary of Changes to PTA Med List  New: add vicoden from last er visit      Patient was asked about OTC/herbal products specifically.  PTA med list reflects this.        The information provided in this note is only as accurate as the sources available at the time of the update(s).    Thank you for the opportunity to participate in the care of this patient.    Scottie Andre RPH  8/25/2022 8:00 PM

## 2022-08-26 NOTE — PLAN OF CARE
"Goal Outcome Evaluation:  Pt A&O x4. Peripheral IV saline locked. Denied pain throughout the night. Asked him about his need for a CPAP machine and pt stated he does not use one at home as it does not help at all. 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  "

## 2022-08-27 LAB
ANION GAP SERPL CALCULATED.3IONS-SCNC: 8 MMOL/L (ref 5–18)
BUN SERPL-MCNC: 16 MG/DL (ref 8–28)
CALCIUM SERPL-MCNC: 9.4 MG/DL (ref 8.5–10.5)
CHLORIDE BLD-SCNC: 108 MMOL/L (ref 98–107)
CO2 SERPL-SCNC: 22 MMOL/L (ref 22–31)
CREAT SERPL-MCNC: 1.23 MG/DL (ref 0.7–1.3)
GFR SERPL CREATININE-BSD FRML MDRD: 60 ML/MIN/1.73M2
GLUCOSE BLD-MCNC: 131 MG/DL (ref 70–125)
GLUCOSE BLDC GLUCOMTR-MCNC: 118 MG/DL (ref 70–99)
GLUCOSE BLDC GLUCOMTR-MCNC: 120 MG/DL (ref 70–99)
GLUCOSE BLDC GLUCOMTR-MCNC: 140 MG/DL (ref 70–99)
GLUCOSE BLDC GLUCOMTR-MCNC: 151 MG/DL (ref 70–99)
POTASSIUM BLD-SCNC: 4.6 MMOL/L (ref 3.5–5)
SODIUM SERPL-SCNC: 138 MMOL/L (ref 136–145)
VANCOMYCIN SERPL-MCNC: 17.6 MG/L

## 2022-08-27 PROCEDURE — 250N000012 HC RX MED GY IP 250 OP 636 PS 637: Performed by: FAMILY MEDICINE

## 2022-08-27 PROCEDURE — 258N000003 HC RX IP 258 OP 636: Performed by: FAMILY MEDICINE

## 2022-08-27 PROCEDURE — 99233 SBSQ HOSP IP/OBS HIGH 50: CPT | Performed by: FAMILY MEDICINE

## 2022-08-27 PROCEDURE — 250N000013 HC RX MED GY IP 250 OP 250 PS 637: Performed by: HOSPITALIST

## 2022-08-27 PROCEDURE — 82374 ASSAY BLOOD CARBON DIOXIDE: CPT | Performed by: FAMILY MEDICINE

## 2022-08-27 PROCEDURE — 36415 COLL VENOUS BLD VENIPUNCTURE: CPT | Performed by: FAMILY MEDICINE

## 2022-08-27 PROCEDURE — 80202 ASSAY OF VANCOMYCIN: CPT | Performed by: FAMILY MEDICINE

## 2022-08-27 PROCEDURE — 120N000001 HC R&B MED SURG/OB

## 2022-08-27 PROCEDURE — 250N000011 HC RX IP 250 OP 636: Performed by: FAMILY MEDICINE

## 2022-08-27 RX ADMIN — VANCOMYCIN HYDROCHLORIDE 1500 MG: 5 INJECTION, POWDER, LYOPHILIZED, FOR SOLUTION INTRAVENOUS at 21:33

## 2022-08-27 RX ADMIN — LABETALOL HYDROCHLORIDE 200 MG: 100 TABLET, FILM COATED ORAL at 08:15

## 2022-08-27 RX ADMIN — CEFEPIME HYDROCHLORIDE 2 G: 2 INJECTION, POWDER, FOR SOLUTION INTRAVENOUS at 16:22

## 2022-08-27 RX ADMIN — LABETALOL HYDROCHLORIDE 200 MG: 100 TABLET, FILM COATED ORAL at 21:25

## 2022-08-27 RX ADMIN — AMLODIPINE BESYLATE 5 MG: 5 TABLET ORAL at 08:16

## 2022-08-27 RX ADMIN — CEFEPIME HYDROCHLORIDE 2 G: 2 INJECTION, POWDER, FOR SOLUTION INTRAVENOUS at 04:24

## 2022-08-27 RX ADMIN — ACETAMINOPHEN 975 MG: 325 TABLET ORAL at 05:23

## 2022-08-27 RX ADMIN — TAMSULOSIN HYDROCHLORIDE 0.4 MG: 0.4 CAPSULE ORAL at 08:16

## 2022-08-27 RX ADMIN — HEPARIN SODIUM 5000 UNITS: 5000 INJECTION, SOLUTION INTRAVENOUS; SUBCUTANEOUS at 05:25

## 2022-08-27 RX ADMIN — ATORVASTATIN CALCIUM 40 MG: 40 TABLET, FILM COATED ORAL at 21:26

## 2022-08-27 RX ADMIN — FINASTERIDE 5 MG: 5 TABLET, FILM COATED ORAL at 08:16

## 2022-08-27 RX ADMIN — OXYCODONE HYDROCHLORIDE 10 MG: 5 TABLET ORAL at 13:14

## 2022-08-27 RX ADMIN — TIZANIDINE 4 MG: 4 TABLET ORAL at 21:26

## 2022-08-27 RX ADMIN — ACETAMINOPHEN 975 MG: 325 TABLET ORAL at 13:11

## 2022-08-27 RX ADMIN — ASPIRIN 81 MG: 81 TABLET, COATED ORAL at 08:16

## 2022-08-27 RX ADMIN — ISOSORBIDE MONONITRATE 30 MG: 30 TABLET, EXTENDED RELEASE ORAL at 08:15

## 2022-08-27 RX ADMIN — HEPARIN SODIUM 5000 UNITS: 5000 INJECTION, SOLUTION INTRAVENOUS; SUBCUTANEOUS at 16:21

## 2022-08-27 RX ADMIN — INSULIN ASPART 1 UNITS: 100 INJECTION, SOLUTION INTRAVENOUS; SUBCUTANEOUS at 07:44

## 2022-08-27 ASSESSMENT — ACTIVITIES OF DAILY LIVING (ADL)
ADLS_ACUITY_SCORE: 22

## 2022-08-27 NOTE — PLAN OF CARE
A&O. Denies pain. IV abx. Independent in room. WOC consulted.     Problem: Plan of Care - These are the overarching goals to be used throughout the patient stay.    Goal: Readiness for Transition of Care  Outcome: Ongoing, Progressing     Problem: Infection  Goal: Absence of Infection Signs and Symptoms  Outcome: Ongoing, Progressing

## 2022-08-27 NOTE — PROGRESS NOTES
[facility] MEDICINE  PROGRESS NOTE     Code Status: Full Code       Identification/Summary:   77 year old male who with a past medical history of BPH, CAD, Cardiomyopathy, Cerebral artery occlusion with CVA, Chronic kidney disease, HTN, Hyperlipidemia, Kidney stone, PVD, Sleep apnea, TIA, Type 2 diabetes mellitus.   8/25 presents with right lower extremity cellulitis unresponsive to doxycycline therapy.  Admitted on vancomycin and IV cefepime.  Slowly responding.  Hopeful discharge 8/28.     Assessment and Plan  Right lower extremity cellulitis  Diabetic foot ulcer  Agree with ED Vanc + cefepime, if allergy, stop cefepime  Consult podiatry  appreciated.  No indication for surgery.  Right lower extremity ultrasound negative for DVT on 8/17/2022.  Normal bilateral lower extremity ABIs.  Prn oxycodone.  Right foot x-ray negative for evidence of osteomyelitis.  Keep right leg elevated.  Continue intravenous vancomycin and cefepime.  Acute kidney injury  Hyperkalemia  Baseline Cr 1.2, now 1.6  Given Lasix x1.  Recheck creatinine 1.35.  Admission potassium 5.2.  Recheck 4.8.  Hold NSAIDS and his home lisinopril.  Recheck BMP on 8/27 creatinine 1.23.  Leukocytosis  Admission white count 12.  Now down to 10.  No further checks indicated.  T2DM  Not on insulin, continue home semaglutide reordered.  6/16/2022 A1c 8.6.  Follow sugars 4 times a day.  Utilize insulin sliding scale.  Chronic HFpEF  LV 55%, RV pressures elevated on last echo 2020  Lasix as above  Essential HTN  Coronary artery disease  BP controlled now, PTA meds reordered  Gout  Does not appear to be acute flare  Normocytic anemia  No reported blood loss     COVID-19 PCR negative from 8/25/2022  Noted.  Standard precautions.  Anticoagulation   Continue home aspirin 81 mg daily.  Ordered subcutaneous heparin per protocol.     Fluids: Saline lock  Pain meds: Tylenol and oxycodone as needed  Therapy: na   Barnes:Not  present  Current Diet  Orders Placed This Encounter      Moderate Consistent Carb (60 g CHO per Meal) Diet    Supplements  None    Barriers to Discharge: Intravenous antibiotics, cellulitis    Disposition: Hopeful discharge 8/28    Interval History/Subjective:  Patient doing well.  Pleased that the leg is doing better.  No chest pain.  No shortness of breath.  No nausea or vomiting.  No lightheadedness or dizziness.  Questions answered to verbalized satisfaction.    Physical Exam/Objective:  Temp:  [97.5  F (36.4  C)-98.1  F (36.7  C)] 97.9  F (36.6  C)  Pulse:  [74-84] 79  Resp:  [16-18] 16  BP: (123-143)/(60-66) 143/66  SpO2:  [90 %-93 %] 92 %  Wt Readings from Last 4 Encounters:   08/25/22 107.5 kg (237 lb)   08/19/22 104.3 kg (230 lb)   08/02/22 110.7 kg (244 lb)   07/13/22 110.7 kg (244 lb)     Body mass index is 32.14 kg/m .    Constitutional: awake, alert, cooperative, no apparent distress, and appears stated age  ENT: Normocephalic, without obvious abnormality, atraumatic, external ears without lesions, oral pharynx with moist mucous membranes, tonsils without erythema or exudates, gums normal and good dentition.  Respiratory: No increased work of breathing, good air exchange, clear to auscultation bilaterally, no crackles or wheezing  Cardiovascular: Normal apical impulse, regular rate and rhythm, normal S1 and S2, no S3 or S4, and no murmur noted  GI: No scars, normal bowel sounds, soft, non-distended, non-tender, no masses palpated, no hepatosplenomegally  Skin: Decreased level of erythema to right lower extremity compared to previous exam.  Otherwise normal skin color, texture, turgor, no redness, warmth, or swelling, and no rashes  Musculoskeletal: There is no redness, warmth, or swelling of the joints.  Motor strength is 5 out of 5 all extremities bilaterally.  Tone is normal.  Decrease lower extremity edema to right lower extremity.  no lower extremity pitting edema present  Neurologic: Cranial nerves  II-XII are grossly intact. Sensory:  Sensory intact  Neuropsychiatric: General: normal, calm and normal eye contact Level of consciousness: alert / normal Affect: normal Orientation: oriented to self, place, time and situation Memory and insight: normal, memory for past and recent events intact and thought process normal      Medications:   Personally Reviewed.  Medications       acetaminophen  975 mg Oral Q8H     amLODIPine  5 mg Oral Daily     aspirin  81 mg Oral Daily     atorvastatin  40 mg Oral QPM     ceFEPIme (MAXIPIME) IV  2 g Intravenous Q12H     finasteride  5 mg Oral Daily     heparin ANTICOAGULANT  5,000 Units Subcutaneous Q12H     insulin aspart  1-7 Units Subcutaneous TID AC     insulin aspart  1-5 Units Subcutaneous At Bedtime     isosorbide mononitrate  30 mg Oral Daily     labetalol  200 mg Oral BID     Semaglutide  7 mg Oral Daily     sodium chloride (PF)  3 mL Intracatheter Q8H     tamsulosin  0.4 mg Oral Daily     tiZANidine  4 mg Oral At Bedtime     vancomycin  1,250 mg Intravenous Q24H       Data reviewed today: I personally reviewed all new medications, labs, imaging/diagnostics reports over the past 24 hours. Pertinent findings include:    Imaging:   Recent Results (from the past 24 hour(s))   US KAREN Doppler No Exercise 1-2 Levels Bilateral    Narrative    EXAM: RESTING ANKLE-BRACHIAL INDICES (ABIs)  LOCATION: [facility]  DATE/TIME: 8/26/2022 4:49 PM    INDICATION: Peripheral arterial disease. Presurgical mapping. Diabetic. Previous smoker. Hypertension.  COMPARISON: None.    KAREN FINDINGS:  RIGHT  Brachial: 135  Ankle (PT): 180 Index: 1.33  Ankle (DP): 184 Index: 1.36  Digit: 108 Index: 0.80    LEFT  Brachial: --  Ankle (PT): 188 Index: 1.39  Ankle (DP): 187 Index: 1.39  Digit: 128 Index: 0.95    The right KAREN at rest is 1.36. The left KAREN at rest is 1.39.      WAVEFORMS: The dorsalis pedis and posterior tibial arteries are normal bilaterally.      Impression     IMPRESSION:  1.  RIGHT LOWER EXTREMITY: KAREN at rest is normal.  2.  LEFT LOWER EXTREMITY: KAREN at rest is normal.       Labs:  US KAREN Doppler No Exercise 1-2 Levels Bilateral   Final Result   IMPRESSION:   1.  RIGHT LOWER EXTREMITY: KAREN at rest is normal.   2.  LEFT LOWER EXTREMITY: KAREN at rest is normal.      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 No acute fracture or dislocation identified. Hindfoot and midfoot joint spaces are unchanged. Persistent dorsal foot soft tissue swelling    with heel spur. Great toe soft tissue swelling with likely soft tissue ulcer distally and plantar. Nothing specific radiographically for great toe osteomyelitis.        Recent Results (from the past 24 hour(s))   Basic metabolic panel    Collection Time: 08/26/22  9:42 AM   Result Value Ref Range    Sodium 136 136 - 145 mmol/L    Potassium 4.8 3.5 - 5.0 mmol/L    Chloride 107 98 - 107 mmol/L    Carbon Dioxide (CO2) 22 22 - 31 mmol/L    Anion Gap 7 5 - 18 mmol/L    Urea Nitrogen 20 8 - 28 mg/dL    Creatinine 1.35 (H) 0.70 - 1.30 mg/dL    Calcium 9.3 8.5 - 10.5 mg/dL    Glucose 174 (H) 70 - 125 mg/dL    GFR Estimate 54 (L) >60 mL/min/1.73m2   CBC with platelets    Collection Time: 08/26/22  9:42 AM   Result Value Ref Range    WBC Count 10.5 4.0 - 11.0 10e3/uL    RBC Count 3.52 (L) 4.40 - 5.90 10e6/uL    Hemoglobin 9.9 (L) 13.3 - 17.7 g/dL    Hematocrit 31.3 (L) 40.0 - 53.0 %    MCV 89 78 - 100 fL    MCH 28.1 26.5 - 33.0 pg    MCHC 31.6 31.5 - 36.5 g/dL    RDW 15.9 (H) 10.0 - 15.0 %    Platelet Count 408 150 - 450 10e3/uL   Glucose by meter    Collection Time: 08/26/22  5:40 PM   Result Value Ref Range    GLUCOSE BY METER POCT 112 (H) 70 - 99 mg/dL   Glucose by meter    Collection Time: 08/26/22  9:18 PM   Result Value Ref Range    GLUCOSE BY METER POCT 167 (H) 70 - 99 mg/dL   Glucose by meter    Collection Time: 08/27/22   6:51 AM   Result Value Ref Range    GLUCOSE BY METER POCT 140 (H) 70 - 99 mg/dL   Basic metabolic panel    Collection Time: 08/27/22  7:10 AM   Result Value Ref Range    Sodium 138 136 - 145 mmol/L    Potassium 4.6 3.5 - 5.0 mmol/L    Chloride 108 (H) 98 - 107 mmol/L    Carbon Dioxide (CO2) 22 22 - 31 mmol/L    Anion Gap 8 5 - 18 mmol/L    Urea Nitrogen 16 8 - 28 mg/dL    Creatinine 1.23 0.70 - 1.30 mg/dL    Calcium 9.4 8.5 - 10.5 mg/dL    Glucose 131 (H) 70 - 125 mg/dL    GFR Estimate 60 (L) >60 mL/min/1.73m2       Pending Labs:  Unresulted Labs Ordered in the Past 30 Days of this Admission     Date and Time Order Name Status Description    8/27/2022  8:00 AM Vancomycin level In process     8/25/2022  6:41 PM Blood Culture Peripheral Blood Preliminary     8/25/2022  6:41 PM Blood Culture Peripheral Blood Preliminary           Skip Esquivel MD  [facility]  Phone: #[phone]

## 2022-08-27 NOTE — PHARMACY-VANCOMYCIN DOSING SERVICE
Pharmacy Vancomycin Note  Date of Service 2022  Patient's  1945   77 year old, male    Indication: Skin and Soft Tissue Infection  Day of Therapy: 3  Current vancomycin regimen:  1250 mg IV q24h  Current vancomycin monitoring method: AUC  Current vancomycin therapeutic monitoring goal: 400-600 mg*h/L    InsightRX Prediction of Current Vancomycin Regimen  Loading dose: 1750 mg x 1 on 22  Regimen: 1250 mg IV every 24 hours.  Start time: 21:08 on 2022  Exposure target: AUC24 (range)400-600 mg/L.hr   AUC24,ss: 438 mg/L.hr  Probability of AUC24 > 400: 66 %  Ctrough,ss: 13.0 mg/L  Probability of Ctrough,ss > 20: 8 %  Probability of nephrotoxicity (Lodise LATISHA ): 8 %      Current estimated CrCl = Estimated Creatinine Clearance: 62.7 mL/min (based on SCr of 1.23 mg/dL).    Creatinine for last 3 days  2022:  5:46 PM Creatinine 1.57 mg/dL  2022:  9:42 AM Creatinine 1.35 mg/dL  2022:  7:10 AM Creatinine 1.23 mg/dL    Recent Vancomycin Levels (past 3 days)  2022:  7:10 AM Vancomycin 17.6 mg/L    Vancomycin IV Administrations (past 72 hours)                   vancomycin 1250 mg in 0.9% NaCl 250 mL intermittent infusion 1,250 mg (mg) 1,250 mg New Bag 22    vancomycin 1750 mg in 0.9% NaCl 500 ml intermittent infusion 1,750 mg (mg) 1,750 mg Given 22                Nephrotoxins and other renal medications (From now, onward)    Start     Dose/Rate Route Frequency Ordered Stop    22 2100  vancomycin 1250 mg in 0.9% NaCl 250 mL intermittent infusion 1,250 mg         1,250 mg  over 90 Minutes Intravenous EVERY 24 HOURS 22 214               Contrast Orders - past 72 hours (72h ago, onward)    None          Interpretation of levels and current regimen:  Vancomycin level is reflective of -600    Has serum creatinine changed greater than 50% in last 72 hours: No    Urine output:  good urine output    Renal Function: Improving    InsightRX  Prediction of Planned New Vancomycin Regimen  Loading dose: N/A  Regimen: 1500 mg IV every 24 hours.  Start time: 21:00 on 08/27/2022  Exposure target: AUC24 (range)400-600 mg/L.hr   AUC24,ss: 520 mg/L.hr  Probability of AUC24 > 400: 89 %  Ctrough,ss: 15.5 mg/L  Probability of Ctrough,ss > 20: 20 %  Probability of nephrotoxicity (Lodise LATISHA 2009): 11 %      Plan:  1. Increase Dose to 1500 mg every 24 hours. The patient is currently in the goal AUC range, but on the lower end. Kidney function is improving, so will increase the dose slightly.  2. Vancomycin monitoring method: AUC  3. Vancomycin therapeutic monitoring goal: 400-600 mg*h/L  4. Pharmacy will check vancomycin levels as appropriate in 1-3 Days.  5. Serum creatinine levels will be ordered daily for the first week of therapy and at least twice weekly for subsequent weeks.    April Valdivia RP

## 2022-08-27 NOTE — PLAN OF CARE
Problem: Plan of Care - These are the overarching goals to be used throughout the patient stay.    Goal: Absence of Hospital-Acquired Illness or Injury  Intervention: Identify and Manage Fall Risk  Recent Flowsheet Documentation  Taken 8/27/2022 8405 by Jayashree Gil RN  Safety Promotion/Fall Prevention: clutter free environment maintained   Goal Outcome Evaluation:    Patient is alert and oriented x 4, was pain free most of the morning but pain shoots intermittently. Utilized PRN oxycodone with effectiveness, ambulates independently in his room, blood sugars and vitalk signs are WNL, continues on IV abx for right leg infection. Discharge pending improvement.

## 2022-08-28 LAB
CREAT SERPL-MCNC: 1.25 MG/DL (ref 0.7–1.3)
GFR SERPL CREATININE-BSD FRML MDRD: 59 ML/MIN/1.73M2
GLUCOSE BLDC GLUCOMTR-MCNC: 113 MG/DL (ref 70–99)
GLUCOSE BLDC GLUCOMTR-MCNC: 125 MG/DL (ref 70–99)
GLUCOSE BLDC GLUCOMTR-MCNC: 130 MG/DL (ref 70–99)
GLUCOSE BLDC GLUCOMTR-MCNC: 135 MG/DL (ref 70–99)
HOLD SPECIMEN: NORMAL

## 2022-08-28 PROCEDURE — 250N000013 HC RX MED GY IP 250 OP 250 PS 637: Performed by: FAMILY MEDICINE

## 2022-08-28 PROCEDURE — 250N000013 HC RX MED GY IP 250 OP 250 PS 637: Performed by: HOSPITALIST

## 2022-08-28 PROCEDURE — 82565 ASSAY OF CREATININE: CPT | Performed by: FAMILY MEDICINE

## 2022-08-28 PROCEDURE — 36415 COLL VENOUS BLD VENIPUNCTURE: CPT | Performed by: FAMILY MEDICINE

## 2022-08-28 PROCEDURE — 99233 SBSQ HOSP IP/OBS HIGH 50: CPT | Performed by: FAMILY MEDICINE

## 2022-08-28 PROCEDURE — 250N000011 HC RX IP 250 OP 636: Performed by: FAMILY MEDICINE

## 2022-08-28 PROCEDURE — 120N000001 HC R&B MED SURG/OB

## 2022-08-28 PROCEDURE — 258N000003 HC RX IP 258 OP 636: Performed by: FAMILY MEDICINE

## 2022-08-28 RX ORDER — COLCHICINE 0.6 MG/1
0.6 TABLET ORAL DAILY
Status: DISCONTINUED | OUTPATIENT
Start: 2022-08-28 | End: 2022-08-29 | Stop reason: HOSPADM

## 2022-08-28 RX ORDER — LIDOCAINE 4 G/G
2 PATCH TOPICAL
Status: DISCONTINUED | OUTPATIENT
Start: 2022-08-28 | End: 2022-08-29 | Stop reason: HOSPADM

## 2022-08-28 RX ADMIN — HEPARIN SODIUM 5000 UNITS: 5000 INJECTION, SOLUTION INTRAVENOUS; SUBCUTANEOUS at 18:27

## 2022-08-28 RX ADMIN — OXYCODONE HYDROCHLORIDE 10 MG: 5 TABLET ORAL at 18:25

## 2022-08-28 RX ADMIN — METFORMIN HYDROCHLORIDE 1000 MG: 500 TABLET, FILM COATED ORAL at 18:13

## 2022-08-28 RX ADMIN — ISOSORBIDE MONONITRATE 30 MG: 30 TABLET, EXTENDED RELEASE ORAL at 08:34

## 2022-08-28 RX ADMIN — VANCOMYCIN HYDROCHLORIDE 1500 MG: 5 INJECTION, POWDER, LYOPHILIZED, FOR SOLUTION INTRAVENOUS at 21:35

## 2022-08-28 RX ADMIN — METFORMIN HYDROCHLORIDE 1000 MG: 500 TABLET, FILM COATED ORAL at 09:48

## 2022-08-28 RX ADMIN — OXYCODONE HYDROCHLORIDE 10 MG: 5 TABLET ORAL at 08:39

## 2022-08-28 RX ADMIN — CEFEPIME HYDROCHLORIDE 2 G: 2 INJECTION, POWDER, FOR SOLUTION INTRAVENOUS at 17:46

## 2022-08-28 RX ADMIN — ATORVASTATIN CALCIUM 40 MG: 40 TABLET, FILM COATED ORAL at 21:39

## 2022-08-28 RX ADMIN — FINASTERIDE 5 MG: 5 TABLET, FILM COATED ORAL at 08:35

## 2022-08-28 RX ADMIN — LIDOCAINE 2 PATCH: 246 PATCH TOPICAL at 09:49

## 2022-08-28 RX ADMIN — LABETALOL HYDROCHLORIDE 200 MG: 100 TABLET, FILM COATED ORAL at 21:33

## 2022-08-28 RX ADMIN — LABETALOL HYDROCHLORIDE 200 MG: 100 TABLET, FILM COATED ORAL at 08:34

## 2022-08-28 RX ADMIN — OXYCODONE HYDROCHLORIDE 5 MG: 5 TABLET ORAL at 01:58

## 2022-08-28 RX ADMIN — HEPARIN SODIUM 5000 UNITS: 5000 INJECTION, SOLUTION INTRAVENOUS; SUBCUTANEOUS at 06:10

## 2022-08-28 RX ADMIN — TAMSULOSIN HYDROCHLORIDE 0.4 MG: 0.4 CAPSULE ORAL at 08:34

## 2022-08-28 RX ADMIN — COLCHICINE 0.6 MG: 0.6 TABLET ORAL at 10:27

## 2022-08-28 RX ADMIN — ASPIRIN 81 MG: 81 TABLET, COATED ORAL at 08:34

## 2022-08-28 RX ADMIN — TIZANIDINE 4 MG: 4 TABLET ORAL at 21:33

## 2022-08-28 RX ADMIN — ACETAMINOPHEN 975 MG: 325 TABLET ORAL at 13:11

## 2022-08-28 RX ADMIN — CEFEPIME HYDROCHLORIDE 2 G: 2 INJECTION, POWDER, FOR SOLUTION INTRAVENOUS at 03:48

## 2022-08-28 RX ADMIN — AMLODIPINE BESYLATE 5 MG: 5 TABLET ORAL at 08:34

## 2022-08-28 ASSESSMENT — ACTIVITIES OF DAILY LIVING (ADL)
ADLS_ACUITY_SCORE: 22

## 2022-08-28 NOTE — PLAN OF CARE
Problem: Infection  Goal: Absence of Infection Signs and Symptoms  8/28/2022 0459 by Ladonna Vaughn RN  Outcome: Ongoing, Not Progressing     Problem: Plan of Care - These are the overarching goals to be used throughout the patient stay.    Goal: Readiness for Transition of Care  8/28/2022 0459 by Ladonna Vaughn RN  Outcome: Ongoing, Progressing     VSS. Reported groin pain, given oxy x1 which was effective. On IV abx. Independent in room.

## 2022-08-28 NOTE — PROGRESS NOTES
Care Management Follow Up    Length of Stay (days): 3    Expected Discharge Date: 08/29/2022     Concerns to be Addressed:  IV antibiotics, right lower extremity cellulitis       Patient plan of care discussed at interdisciplinary rounds: Yes    Anticipated Discharge Disposition:  home     Anticipated Discharge Services:  TBD (anticipate IV antibiotics)    Anticipated Discharge DME: TBD       Education Provided on the Discharge Plan:  AVS per bedside RN.    Patient/Family in Agreement with the Plan:  yes    Referrals Placed by CM/SW:  [facility]        Additional Information:  Chart reviewed. CM met with patient. Possible need for IV antibiotics at time of hospital discharge. Anticipate patient will drive himself home at time of hospital discharge. CM will continue to follow.       Polina Zavala RN

## 2022-08-28 NOTE — PLAN OF CARE
Goal Outcome Evaluation:  Patient is alert and oriented, pain in right lower extremity is managed by PRN and scheduled meds with effectiveness,  RLE elevated on pillows, independent with transfers, calls appropriately.

## 2022-08-28 NOTE — PROGRESS NOTES
[facility] MEDICINE  PROGRESS NOTE     Code Status: Full Code    Identification/Summary:   77 year old male who with a past medical history of BPH, CAD, Cardiomyopathy, Cerebral artery occlusion with CVA, Chronic kidney disease, HTN, Hyperlipidemia, Kidney stone, PVD, Sleep apnea, TIA, Type 2 diabetes mellitus.   8/25 presents with right lower extremity cellulitis unresponsive to doxycycline therapy.  Admitted on vancomycin and IV cefepime.  Slowly responding.  Hopeful discharge 8/29.     Assessment and Plan  Right lower extremity cellulitis  Diabetic foot ulcer  Admitted on vanc + cefepime  Consult podiatry appreciated.  No indication for surgery.  Right lower extremity ultrasound negative for DVT on 8/17/2022.  Normal bilateral lower extremity ABIs.  Prn oxycodone.  Right foot x-ray negative for evidence of osteomyelitis.  Keep right leg elevated.  Continue intravenous vancomycin and cefepime.  Slow response.  Acute kidney injury  Hyperkalemia  Baseline Cr 1.2, now 1.6  Given Lasix x1.  Recheck creatinine 1.35.  Admission potassium 5.2.  Recheck 4.8.  Hold NSAIDS and his home lisinopril.  Recheck BMP on 8/27 creatinine 1.23.  Leukocytosis  Admission white count 12.  Now down to 10.  No further checks indicated.  T2DM  Not on insulin, continue home semaglutide reordered.  6/16/2022 A1c 8.6.  Follow sugars 4 times a day.  Utilize insulin sliding scale.  8/28 informed patient was on home metformin 1000 twice daily.  Added to home med list and ordered.  Chronic HFpEF  LV 55%, RV pressures elevated on last echo 2020  Lasix as above  Essential HTN  Coronary artery disease  BP controlled now, PTA meds reordered  Gout  Does not appear to be acute flare.  Does take colchicine on a daily basis.  Will order and this may help his cellulitis recovery rate.  Normocytic anemia  No reported blood loss     COVID-19 PCR negative from 8/25/2022  Noted.  Standard  precautions.  Anticoagulation   Continue home aspirin 81 mg daily.  Ordered subcutaneous heparin per protocol.     Fluids: Saline lock  Pain meds: Tylenol and oxycodone as needed  Therapy: na   Barnes:Not present  Current Diet  Orders Placed This Encounter      Moderate Consistent Carb (60 g CHO per Meal) Diet    Supplements  None    Barriers to Discharge: IV antibiotics, right lower extremity cellulitis    Disposition: Hopeful discharge 8/29    Interval History/Subjective:  Patient doing okay.  The right leg is still painful but he feels like it is slowly improving.  Was asking about taking his home metformin.  Had not been included on his home med list previously.  No chest pain.  No shortness of breath.  No nausea or vomiting.  Agreeable to staying.  Questions answered to verbalized satisfaction.    Physical Exam/Objective:  Temp:  [97.7  F (36.5  C)-98.4  F (36.9  C)] 98.1  F (36.7  C)  Pulse:  [75-84] 84  Resp:  [16-18] 16  BP: (127-158)/(61-72) 158/72  SpO2:  [91 %-92 %] 92 %  Wt Readings from Last 4 Encounters:   08/25/22 107.5 kg (237 lb)   08/19/22 104.3 kg (230 lb)   08/02/22 110.7 kg (244 lb)   07/13/22 110.7 kg (244 lb)     Body mass index is 32.14 kg/m .    Constitutional: awake, alert, cooperative, no apparent distress, and appears stated age  ENT: Normocephalic, without obvious abnormality, atraumatic, external ears without lesions, oral pharynx with moist mucous membranes, tonsils without erythema or exudates, gums normal and good dentition.  Respiratory: No increased work of breathing, good air exchange, clear to auscultation bilaterally, no crackles or wheezing  Cardiovascular: Normal apical impulse, regular rate and rhythm, normal S1 and S2, no S3 or S4, and no murmur noted  GI: No scars, normal bowel sounds, soft, non-distended, non-tender, no masses palpated, no hepatosplenomegally  Skin: Right lower extremity erythema present.  Has decreased in size.  Similar intensity.  Otherwise normal skin  color, texture, turgor, no redness, warmth, or swelling, and no rashes  Musculoskeletal: There is no redness, warmth, or swelling of the joints.  Motor strength is 5 out of 5 all extremities bilaterally.  Tone is normal. no lower extremity pitting edema present  Neurologic: Cranial nerves II-XII are grossly intact. Sensory:  Sensory intact  Neuropsychiatric: General: normal, calm and normal eye contact Level of consciousness: alert / normal Affect: normal Orientation: oriented to self, place, time and situation Memory and insight: normal, memory for past and recent events intact and thought process normal      Medications:   Personally Reviewed.  Medications       acetaminophen  975 mg Oral Q8H     amLODIPine  5 mg Oral Daily     aspirin  81 mg Oral Daily     atorvastatin  40 mg Oral QPM     ceFEPIme (MAXIPIME) IV  2 g Intravenous Q12H     finasteride  5 mg Oral Daily     heparin ANTICOAGULANT  5,000 Units Subcutaneous Q12H     insulin aspart  1-7 Units Subcutaneous TID AC     insulin aspart  1-5 Units Subcutaneous At Bedtime     isosorbide mononitrate  30 mg Oral Daily     labetalol  200 mg Oral BID     Semaglutide  7 mg Oral Daily     sodium chloride (PF)  3 mL Intracatheter Q8H     tamsulosin  0.4 mg Oral Daily     tiZANidine  4 mg Oral At Bedtime     vancomycin  1,500 mg Intravenous Q24H       Data reviewed today: I personally reviewed all new medications, labs, imaging/diagnostics reports over the past 24 hours. Pertinent findings include:    Imaging:   No results found for this or any previous visit (from the past 24 hour(s)).    Labs:  US KAREN Doppler No Exercise 1-2 Levels Bilateral   Final Result   IMPRESSION:   1.  RIGHT LOWER EXTREMITY: KAREN at rest is normal.   2.  LEFT LOWER EXTREMITY: KAREN at rest is normal.      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 No acute fracture or dislocation  identified. Hindfoot and midfoot joint spaces are unchanged. Persistent dorsal foot soft tissue swelling    with heel spur. Great toe soft tissue swelling with likely soft tissue ulcer distally and plantar. Nothing specific radiographically for great toe osteomyelitis.        Recent Results (from the past 24 hour(s))   Glucose by meter    Collection Time: 08/27/22 11:38 AM   Result Value Ref Range    GLUCOSE BY METER POCT 120 (H) 70 - 99 mg/dL   Glucose by meter    Collection Time: 08/27/22  4:54 PM   Result Value Ref Range    GLUCOSE BY METER POCT 118 (H) 70 - 99 mg/dL   Glucose by meter    Collection Time: 08/27/22  8:55 PM   Result Value Ref Range    GLUCOSE BY METER POCT 151 (H) 70 - 99 mg/dL   Glucose by meter    Collection Time: 08/28/22  7:15 AM   Result Value Ref Range    GLUCOSE BY METER POCT 135 (H) 70 - 99 mg/dL   Creatinine    Collection Time: 08/28/22  7:41 AM   Result Value Ref Range    Creatinine 1.25 0.70 - 1.30 mg/dL    GFR Estimate 59 (L) >60 mL/min/1.73m2       Pending Labs:  Unresulted Labs Ordered in the Past 30 Days of this Admission     Date and Time Order Name Status Description    8/25/2022  6:41 PM Blood Culture Peripheral Blood Preliminary     8/25/2022  6:41 PM Blood Culture Peripheral Blood Preliminary           Skip Esquivel MD  [facility]  Phone: #[phone]

## 2022-08-29 VITALS
WEIGHT: 224.7 LBS | TEMPERATURE: 98 F | OXYGEN SATURATION: 93 % | RESPIRATION RATE: 18 BRPM | BODY MASS INDEX: 30.43 KG/M2 | HEART RATE: 89 BPM | HEIGHT: 72 IN | DIASTOLIC BLOOD PRESSURE: 66 MMHG | SYSTOLIC BLOOD PRESSURE: 151 MMHG

## 2022-08-29 LAB
CREAT SERPL-MCNC: 1.24 MG/DL (ref 0.7–1.3)
GFR SERPL CREATININE-BSD FRML MDRD: 60 ML/MIN/1.73M2
GLUCOSE BLDC GLUCOMTR-MCNC: 131 MG/DL (ref 70–99)
PLATELET # BLD AUTO: 430 10E3/UL (ref 150–450)

## 2022-08-29 PROCEDURE — 85049 AUTOMATED PLATELET COUNT: CPT | Performed by: FAMILY MEDICINE

## 2022-08-29 PROCEDURE — 250N000013 HC RX MED GY IP 250 OP 250 PS 637: Performed by: HOSPITALIST

## 2022-08-29 PROCEDURE — 99239 HOSP IP/OBS DSCHRG MGMT >30: CPT | Performed by: FAMILY MEDICINE

## 2022-08-29 PROCEDURE — G0463 HOSPITAL OUTPT CLINIC VISIT: HCPCS

## 2022-08-29 PROCEDURE — 250N000011 HC RX IP 250 OP 636: Performed by: FAMILY MEDICINE

## 2022-08-29 PROCEDURE — 250N000013 HC RX MED GY IP 250 OP 250 PS 637: Performed by: FAMILY MEDICINE

## 2022-08-29 PROCEDURE — 36415 COLL VENOUS BLD VENIPUNCTURE: CPT | Performed by: FAMILY MEDICINE

## 2022-08-29 PROCEDURE — 82565 ASSAY OF CREATININE: CPT | Performed by: FAMILY MEDICINE

## 2022-08-29 RX ORDER — ACETAMINOPHEN 325 MG/1
975 TABLET ORAL EVERY 6 HOURS PRN
COMMUNITY
Start: 2022-08-29 | End: 2022-08-29

## 2022-08-29 RX ORDER — ACETAMINOPHEN 325 MG/1
650 TABLET ORAL EVERY 6 HOURS PRN
COMMUNITY
Start: 2022-08-29 | End: 2022-09-09

## 2022-08-29 RX ORDER — CLINDAMYCIN HCL 300 MG
300 CAPSULE ORAL 4 TIMES DAILY
Qty: 28 CAPSULE | Refills: 0 | Status: SHIPPED | OUTPATIENT
Start: 2022-08-29 | End: 2022-09-05

## 2022-08-29 RX ORDER — CEFDINIR 300 MG/1
300 CAPSULE ORAL 2 TIMES DAILY
Qty: 14 CAPSULE | Refills: 0 | Status: SHIPPED | OUTPATIENT
Start: 2022-08-29 | End: 2022-09-05

## 2022-08-29 RX ADMIN — HEPARIN SODIUM 5000 UNITS: 5000 INJECTION, SOLUTION INTRAVENOUS; SUBCUTANEOUS at 06:37

## 2022-08-29 RX ADMIN — LABETALOL HYDROCHLORIDE 200 MG: 100 TABLET, FILM COATED ORAL at 08:02

## 2022-08-29 RX ADMIN — OXYCODONE HYDROCHLORIDE 10 MG: 5 TABLET ORAL at 01:07

## 2022-08-29 RX ADMIN — ISOSORBIDE MONONITRATE 30 MG: 30 TABLET, EXTENDED RELEASE ORAL at 08:02

## 2022-08-29 RX ADMIN — COLCHICINE 0.6 MG: 0.6 TABLET ORAL at 08:02

## 2022-08-29 RX ADMIN — FINASTERIDE 5 MG: 5 TABLET, FILM COATED ORAL at 08:02

## 2022-08-29 RX ADMIN — CEFEPIME HYDROCHLORIDE 2 G: 2 INJECTION, POWDER, FOR SOLUTION INTRAVENOUS at 06:37

## 2022-08-29 RX ADMIN — TAMSULOSIN HYDROCHLORIDE 0.4 MG: 0.4 CAPSULE ORAL at 08:02

## 2022-08-29 RX ADMIN — OXYCODONE HYDROCHLORIDE 10 MG: 5 TABLET ORAL at 06:40

## 2022-08-29 RX ADMIN — AMLODIPINE BESYLATE 5 MG: 5 TABLET ORAL at 08:02

## 2022-08-29 RX ADMIN — LIDOCAINE 2 PATCH: 246 PATCH TOPICAL at 08:02

## 2022-08-29 RX ADMIN — METFORMIN HYDROCHLORIDE 1000 MG: 500 TABLET, FILM COATED ORAL at 08:02

## 2022-08-29 RX ADMIN — ASPIRIN 81 MG: 81 TABLET, COATED ORAL at 08:01

## 2022-08-29 ASSESSMENT — ACTIVITIES OF DAILY LIVING (ADL)
ADLS_ACUITY_SCORE: 22

## 2022-08-29 NOTE — PROGRESS NOTES
Care Management Discharge Note    Discharge Date: 08/29/2022       Discharge Disposition: Home    Discharge Services: None    Discharge DME:  None     Discharge Transportation: car, drives self    Private pay costs discussed: Not applicable    Education Provided on the Discharge Plan:  yes  Persons Notified of Discharge Plans: yes  Patient/Family in Agreement with the Plan:  yes    Additional Information:  SWCM reviewed opt chart and plan for pt to d.c home with jose de jesus ABX and no needs from CM. Pt will drive self home .     Marcella Modi, MARITASW

## 2022-08-29 NOTE — PLAN OF CARE
Discharge paperwork and teaching with patient has been completed. RN gave adequate time to answer questions and patient verbally stated they understand information.    Elizabeth Smiley RN

## 2022-08-29 NOTE — DISCHARGE INSTRUCTIONS
R great toe wound care: daily  Cleanse with water, gently dry.  Iodosorb gel dressing to wound and cover with a bandage    Follow up with Dr. Azevedo per your planned follow up.

## 2022-08-29 NOTE — CONSULTS
[facility]  WO Nurse Inpatient Assessment     Consulted for: R great toe    Patient History (according to provider note(s):    Patient follows with DPM Jolly. Will plan to continue orders as written at discharge. Will plan for inpatient recommendations based on supply formulary and comparable products.  Daily: continue to do Iodosorb gel dressing changes daily with a bandage    Areas Assessed:      R great toe already dressed.     Treatment Plan:     Reviewed OP orders. Patient verbalized understanding to continue with that plan of care and follow up with his DPM.  Discharge orders updated for this wound on AVS.   Anus position normal and patency confirmed, rectal-cutaneous fistula absent, normal anal wink.

## 2022-08-29 NOTE — DISCHARGE SUMMARY
[facility] MEDICINE  DISCHARGE SUMMARY     Primary Care Physician: Anh Spivey  Admission Date: 8/25/2022   Discharge Provider: Skip Esquivel MD Discharge Date: 8/29/2022   Diet:   Active Diet and Nourishment Order   Procedures     Moderate Consistent Carb (60 g CHO per Meal) Diet     Diet     Code Status: Full Code   Activity: DCACTIVITY: Activity as tolerated  Keep right leg elevated is much as possible when seated      Condition at Discharge: Stable     REASON FOR PRESENTATION(See Admission Note for Details)   Right leg cellulitis unresponsive to oral antibiotics    PRINCIPAL & ACTIVE DISCHARGE DIAGNOSES     Principal Problem:    Cellulitis of right lower extremity  Active Problems:    Hypertension    Type II diabetes mellitus (H)    CKD (chronic kidney disease) stage 3, GFR 30-59 ml/min (H)  Diabetic right first toe ulcer chronic  Acute on chronic kidney disease stage IIIa  Hyperkalemia  Leukocytosis  Chronic heart failure with preserved ejection fraction  Coronary artery disease  Gout  Normocytic anemia chronic    PENDING LABS     Unresulted Labs Ordered in the Past 30 Days of this Admission     Date and Time Order Name Status Description    8/25/2022  6:41 PM Blood Culture Peripheral Blood Preliminary     8/25/2022  6:41 PM Blood Culture Peripheral Blood Preliminary         PROCEDURES ( this hospitalization only)      None    RECOMMENDATIONS TO OUTPATIENT PROVIDER FOR F/U VISIT   Follow-up Appointments     Follow-up and recommended labs and tests      Follow-up with primary care provider in 1 week.  Evaluate right leg and   compare to digital photo in system.         Keep routine follow-up with his podiatrist.    DISPOSITION     Home    SUMMARY OF HOSPITAL COURSE:      77 year old male who with a past medical history of BPH, CAD, Cardiomyopathy, Cerebral artery occlusion with CVA, Chronic kidney disease, HTN, Hyperlipidemia, Kidney stone, PVD, Sleep apnea, TIA,  Type 2 diabetes mellitus.   8/25 presents with right lower extremity cellulitis unresponsive to oral doxycycline therapy.  Admitted on vancomycin and IV cefepime.      Did slowly respond to the intravenous antibiotics.  Podiatry was consulted and no indication for ulcer debridement at this time.  His colchicine was restarted and this may have helped as well.  On the time of discharge had minimal erythema and edema had resolved.  Cleared for discharge.  We will have him on 7 more days of oral antibiotics.  Patient should keep his right leg elevated is much as possible.  Digital photo taken and enclosed in medical record.  Primary care to evaluate for comparison.    Patient did have some mild DEAN but with IV fluids this was corrected back to his usual baseline.  Recommend staying off of ibuprofen due to chronic kidney disease.  Diabetes remained under good control during his stay.    Discharge Medications with Med changes:     Current Discharge Medication List      START taking these medications    Details   acetaminophen (TYLENOL) 325 MG tablet Take 2 tablets (650 mg) by mouth every 6 hours as needed for mild pain    Associated Diagnoses: Cellulitis of right lower extremity      cefdinir (OMNICEF) 300 MG capsule Take 1 capsule (300 mg) by mouth 2 times daily for 7 days  Qty: 14 capsule, Refills: 0    Associated Diagnoses: Cellulitis of right lower extremity         CONTINUE these medications which have CHANGED    Details   clindamycin (CLEOCIN) 300 MG capsule Take 1 capsule (300 mg) by mouth 4 times daily for 7 days  Qty: 28 capsule, Refills: 0    Associated Diagnoses: Cellulitis of right lower extremity         CONTINUE these medications which have NOT CHANGED    Details   amLODIPine (NORVASC) 5 MG tablet Take by mouth every 24 hours      aspirin (ASA) 81 MG EC tablet Take by mouth every 24 hours      atorvastatin (LIPITOR) 40 MG tablet Take 40 mg by mouth every 24 hours      celecoxib (CELEBREX) 200 MG capsule TAKE  1 CAPSULE BY MOUTH TWICE A DAY      colchicine (COLCYRS) 0.6 MG tablet Take 1 tablet (0.6 mg) by mouth daily  Qty: 30 tablet, Refills: 1    Associated Diagnoses: Gouty arthropathy      Ferrous Gluconate 240 (27 Fe) MG TABS Take 1 tablet by mouth daily       finasteride (PROSCAR) 5 MG tablet Take by mouth every 24 hours      fluticasone (FLONASE) 50 MCG/ACT nasal spray Spray 1-2 sprays in nostril every 24 hours      HYDROcodone-acetaminophen (NORCO) 5-325 MG tablet Take 1 tablet by mouth At Bedtime      isosorbide mononitrate (IMDUR) 30 MG 24 hr tablet Take 1 tablet (30 mg) by mouth daily  Qty: 90 tablet, Refills: 3    Associated Diagnoses: Dyspnea on exertion; Chest pain, unspecified type; Cardiomyopathy, unspecified type (H)      labetalol (NORMODYNE) 200 MG tablet Take 200 mg by mouth 2 times daily      Lidocaine (LIDOCARE) 4 % Patch Place 1 patch onto the skin every 24 hours To prevent lidocaine toxicity, patient should be patch free for 12 hrs daily.    Associated Diagnoses: Idiopathic gout, unspecified chronicity, unspecified site      lisinopril (ZESTRIL) 10 MG tablet Take 10 mg by mouth every 24 hours      loratadine (CLARITIN) 10 MG tablet Take by mouth every 24 hours      metFORMIN (GLUCOPHAGE) 1000 MG tablet Take 1,000 mg by mouth 2 times daily (with meals)      multivitamin w/minerals (THERA-VIT-M) tablet       Semaglutide (RYBELSUS) 7 MG TABS Take 7 mg by mouth daily  Qty: 90 tablet, Refills: 3    Associated Diagnoses: Type 2 diabetes, HbA1c goal < 7% (H)      tamsulosin (FLOMAX) 0.4 MG 24 hr capsule Take 1 capsule by mouth daily.      tiZANidine (ZANAFLEX) 2 MG tablet Take 4 mg by mouth At Bedtime      torsemide (DEMADEX) 20 MG tablet Take 1 tablet (20 mg) by mouth daily  Qty: 90 tablet, Refills: 2    Associated Diagnoses: Essential hypertension      blood glucose (NO BRAND SPECIFIED) lancets standard Use to test blood sugar 2 times daily or as directed.  Qty: 100 lancet, Refills: 4    Associated  Diagnoses: Type 2 diabetes mellitus with peripheral vascular disease (H)      blood glucose (NO BRAND SPECIFIED) test strip Use to test blood sugar 2 times daily or as directed.  Qty: 100 strip, Refills: 4    Associated Diagnoses: Type 2 diabetes mellitus with peripheral vascular disease (H)      blood glucose monitoring (NO BRAND SPECIFIED) meter device kit Use to test blood sugar 2 times daily or as directed.  Qty: 1 kit, Refills: 0    Associated Diagnoses: Type 2 diabetes mellitus with peripheral vascular disease (H)         STOP taking these medications       ibuprofen (ADVIL/MOTRIN) 200 MG tablet Comments:   Reason for Stopping:                 Rationale for medication changes:      Antibiotics for cellulitis.  Tylenol for pain control.      Consults     PHARMACY TO DOSE VANCO  PODIATRY IP CONSULT  PHARMACY TO DOSE VANCO  CARE MANAGEMENT / SOCIAL WORK IP CONSULT  WOUND OSTOMY CONTINENCE NURSE  IP CONSULT  PHARMACY IP CONSULT      Immunizations given this encounter     Most Recent Immunizations   Administered Date(s) Administered     COVID-19,PF,Moderna 05/12/2022     COVID-19,PF,Pfizer (12+ Yrs) 10/11/2021     Influenza (High Dose) 3 valent vaccine 08/24/2019     Influenza (IIV3) PF 10/20/2014     Influenza, Quad, High Dose, Pf, 65yr+ (Fluzone HD) 09/11/2021     Pneumo Conj 13-V (2010&after) 10/19/2015     Pneumococcal 23 valent 12/22/2011     TDAP Vaccine (Adacel) 04/18/2013     Tdap (Adacel,Boostrix) 07/14/2003           Anticoagulation Information      Recent INR results: No results for input(s): INR in the last 168 hours.  Warfarin doses (if applicable) or name of other anticoagulant: Aspirin      SIGNIFICANT IMAGING FINDINGS     Results for orders placed or performed during the hospital encounter of 08/25/22   XR Toe Right G/E 2 Views    Impression    IMPRESSION: Unchanged severe arthritis at the right first MTP and metatarsal sesamoid joints with dorsal spurring. No acute fracture or dislocation  identified. Hindfoot and midfoot joint spaces are unchanged. Persistent dorsal foot soft tissue swelling   with heel spur. Great toe soft tissue swelling with likely soft tissue ulcer distally and plantar. Nothing specific radiographically for great toe osteomyelitis.   US KAREN Doppler No Exercise 1-2 Levels Bilateral    Impression    IMPRESSION:  1.  RIGHT LOWER EXTREMITY: KAREN at rest is normal.  2.  LEFT LOWER EXTREMITY: KAREN at rest is normal.       SIGNIFICANT LABORATORY FINDINGS     Most Recent 3 CBC's:  Recent Labs   Lab Test 08/29/22  0802 08/26/22  0942 08/25/22  1746 06/16/22  0934   WBC  --  10.5 12.2* 8.7   HGB  --  9.9* 10.9* 12.2*   MCV  --  89 89 89    408 441 254     Most Recent 3 BMP's:  Recent Labs   Lab Test 08/29/22  0802 08/29/22  0640 08/28/22  2130 08/28/22  1644 08/28/22  1153 08/28/22  0741 08/27/22  1138 08/27/22  0710 08/26/22  1740 08/26/22  0942 08/25/22  1746   NA  --   --   --   --   --   --   --  138  --  136 138   POTASSIUM  --   --   --   --   --   --   --  4.6  --  4.8 5.2*   CHLORIDE  --   --   --   --   --   --   --  108*  --  107 106   CO2  --   --   --   --   --   --   --  22 --  22 21*   BUN  --   --   --   --   --   --   --  16 --  20 23   CR 1.24  --   --   --   --  1.25  --  1.23  --  1.35* 1.57*   ANIONGAP  --   --   --   --   --   --   --  8  --  7 11   SHANNAN  --   --   --   --   --   --   --  9.4  --  9.3 9.3   GLC  --  131* 130* 113*   < >  --    < > 131*   < > 174* 100    < > = values in this interval not displayed.     Most Recent 2 LFT's:  Recent Labs   Lab Test 06/16/22  0934 11/01/21  0737 04/29/21  1631 07/28/20  0626   AST 13  --   --  17   ALT 16 59   < > 27   ALKPHOS 136*  --   --  87   BILITOTAL 0.3  --   --  0.3    < > = values in this interval not displayed.     Most Recent 3 INR's:  Recent Labs   Lab Test 12/25/21 2000 03/04/19  0947 02/26/19  1539   INR 1.17* 1.13 1.04         Discharge Orders        Reason for your hospital stay    Cellulitis right  lower extremity     Follow-up and recommended labs and tests    Follow-up with primary care provider in 1 week.  Evaluate right leg and compare to digital photo in system.     Activity    Your activity upon discharge: activity as tolerated.  When sitting keep leg elevated is much as possible.     Monitor and record    Follow blood sugars 4 times a day.  Bring readings to follow-up appointment with primary.     Diet    Follow this diet upon discharge: Orders Placed This Encounter      Moderate Consistent Carb (60 g CHO per Meal) Diet       Examination   Physical Exam   Temp:  [97.8  F (36.6  C)-98.5  F (36.9  C)] 98  F (36.7  C)  Pulse:  [80-89] 89  Resp:  [16-24] 18  BP: (117-151)/(55-66) 151/66  SpO2:  [87 %-95 %] 93 %  Wt Readings from Last 4 Encounters:   08/29/22 101.9 kg (224 lb 11.2 oz)   08/19/22 104.3 kg (230 lb)   08/02/22 110.7 kg (244 lb)   07/13/22 110.7 kg (244 lb)       Constitutional: awake, alert, cooperative, no apparent distress, and appears stated age  Eyes: Lids and lashes normal, pupils equal, round and reactive to light, extra ocular muscles intact, sclera clear, conjunctiva normal  ENT: Normocephalic, without obvious abnormality, atraumatic, sinuses nontender on palpation, external ears without lesions, oral pharynx with moist mucous membranes, tonsils without erythema or exudates, gums normal and good dentition.  Respiratory: No increased work of breathing, good air exchange, clear to auscultation bilaterally, no crackles or wheezing  Cardiovascular: Normal apical impulse, regular rate and rhythm, normal S1 and S2, no S3 or S4, and no murmur noted  GI: No scars, normal bowel sounds, soft, non-distended, non-tender, no masses palpated, no hepatosplenomegally  Skin: Significant improvement erythema to right lower extremity.  Digital photo below.  Otherwise normal skin color, texture, turgor, no redness, warmth, or swelling, and no rashes  Musculoskeletal: Significant improvement to  musculoskeletal system.  No muscular strength deficits.  Minimal right lower extremity edema.  Substantial improvement compared to previous examination.    Neurologic: Cranial nerves II-XII are grossly intact. Sensory:  Sensory intact  Neuropsychiatric: General: normal, calm and normal eye contact Level of consciousness: alert / normal Affect: normal Orientation: oriented to self, place, time and situation Memory and insight: normal, memory for past and recent events intact and thought process normal            Please see EMR for more detailed significant labs, imaging, consultant notes etc.    I, Skip Esquivel MD, personally saw the patient today and spent greater than 30 minutes discharging this patient.    Skip Esquivel MD  [facility]    CC:Anh Spivey

## 2022-08-29 NOTE — PLAN OF CARE
Problem: Infection  Goal: Absence of Infection Signs and Symptoms  Outcome: Ongoing, Progressing   Receiving IV ABX as ordered. Afebrile this shift, VSS. RLE remains reddened           Oxycodone for right hip pain related to arthritis this shift was effective for pain control per pt report.    Margarita King RN  8/29/2022  6:01 AM

## 2022-08-30 ENCOUNTER — HOSPITAL ENCOUNTER (EMERGENCY)
Facility: CLINIC | Age: 77
Discharge: HOME | End: 2022-08-30
Attending: FAMILY MEDICINE | Admitting: FAMILY MEDICINE
Payer: COMMERCIAL

## 2022-08-30 ENCOUNTER — APPOINTMENT (OUTPATIENT)
Dept: ULTRASOUND IMAGING | Facility: CLINIC | Age: 77
End: 2022-08-30
Attending: FAMILY MEDICINE
Payer: COMMERCIAL

## 2022-08-30 ENCOUNTER — TRANSFERRED RECORDS (OUTPATIENT)
Dept: HEALTH INFORMATION MANAGEMENT | Facility: CLINIC | Age: 77
End: 2022-08-30

## 2022-08-30 VITALS
HEART RATE: 84 BPM | BODY MASS INDEX: 30.75 KG/M2 | TEMPERATURE: 97.9 F | HEIGHT: 72 IN | DIASTOLIC BLOOD PRESSURE: 70 MMHG | OXYGEN SATURATION: 99 % | SYSTOLIC BLOOD PRESSURE: 147 MMHG | RESPIRATION RATE: 18 BRPM | WEIGHT: 227 LBS

## 2022-08-30 DIAGNOSIS — S76.311A STRAIN OF RIGHT HAMSTRING MUSCLE, INITIAL ENCOUNTER: ICD-10-CM

## 2022-08-30 PROCEDURE — 93971 EXTREMITY STUDY: CPT | Mod: RT

## 2022-08-30 PROCEDURE — 99284 EMERGENCY DEPT VISIT MOD MDM: CPT | Mod: 25

## 2022-08-30 NOTE — ED PROVIDER NOTES
EMERGENCY DEPARTMENT ENCOUNTER      NAME: Lance Ibrahim  AGE: 77 year old male  YOB: 1945  MRN: [id number]  EVALUATION DATE & TIME: 8/30/2022 12:02 PM    PCP: Anh Spivey    ED PROVIDER: Omari Mercado M.D.    Chief Complaint   Patient presents with     Leg Pain       FINAL IMPRESSION:  1. Strain of right hamstring muscle, initial encounter        ED COURSE & MEDICAL DECISION MAKING:    Pertinent Labs & Imaging studies personally reviewed and interpreted by me. (See chart for details)  12:12 PM Patient seen and examined, prior records reviewed.  Differential diagnosis includes but not limited to sprain, sprain, gout, dislocation, septic arthritis, arthritis.  Patient with right popliteal pain, abrupt onset when standing from a seated position.  Concerned about DVT.  Ultrasound is negative.  Symptoms are most consistent with musculoskeletal pain, no bunching of the muscles to suggest complete rupture but avulsion or strain possible.  Symptomatic treatment and follow-up with primary care, consider physical therapy as well.  I discussed the plan for discharge with the patient, and patient is agreeable.  We discussed supportive cares at home and reasons for return to the ER including new or worsening symptoms - all questions and concerns addressed.  Patient to be discharged by RN.     At the conclusion of the encounter I discussed the results of all of the tests and the disposition. The questions were answered. The patient or family acknowledged understanding and was agreeable with the care plan.     PROCEDURES:   Procedures    MEDICATIONS GIVEN IN THE EMERGENCY:  Medications - No data to display    NEW PRESCRIPTIONS STARTED AT TODAY'S ER VISIT  Discharge Medication List as of 8/30/2022 12:21 PM          =================================================================    HPI    Patient information was obtained from: Patient      Lance Ibrahim is a 77 year old male with a pertinent history of  hypertension, type 2 diabetes mellitus, cardiomyopathy, CKD stage 3, cellulitis of right lower extremity, TIA, PVD, and CAD who presents to this ED by walk-in for evaluation of leg pain.     Per chart review: On 8/25/2022 - 8/29/2022 at [facility], patient presented with right lower extremity cellulitis unresponsive to oral doxycycline therapy. Admitted on vancomycin and IV cefepime. Patient did slowly respond to IV antibiotics. On the time of discharge had minimal erythema and edema had resolved. Cleared for discharge. We will have him on 7 more days of oral antibiotics.    Patient endorses a sudden onset of right thigh pain. Patient states he was sitting watching tv, and when he stood up a shooting pain went through the muscle in the back of his thigh and the base of his hip. Patient endorses the pain worsens when moving the leg, and gets better when sitting or laying down. Patient notes he was seen yesterday for the cellulitis he has had in his right leg since 8/19/2022. States he talked with his doctor this morning who told him to present due to a worry for blood clots.     REVIEW OF SYSTEMS   Review of Systems   Cardiovascular: Positive for leg swelling (right).   Musculoskeletal:        Positive for right thigh pain   All other systems reviewed and are negative.       PAST MEDICAL HISTORY:  Past Medical History:   Diagnosis Date     Arthritis     osteoarthritis knees     BPH      CAD (coronary artery disease)     subtotal occlusion in the small distal LAD      Cardiomyopathy      Cerebral artery occlusion with cerebral infarction     TIA 1993, no residual     Chronic kidney disease      Chronic rhinitis      HTN (hypertension)      Hyperlipidemia      Kidney stone      PVD (peripheral vascular disease)      Sleep apnea     doesn't use cpap     TIA (transient ischaemic attack) 1993     Type 2 diabetes mellitus - 11/08/2018     Ureteral stone        PAST SURGICAL HISTORY:  Past Surgical History:    Procedure Laterality Date     AMPUTATE TOE(S) Left 10/15/2018    Procedure: AMPUTATE TOE(S);  Left third toe amputation;  Surgeon: Ayaka Azevedo DPM, Podiatry/Foot and Ankle Surgery;  Location: [facility] OR     ARTHROPLASTY KNEE Right 12/07/2018    Procedure: Right total knee arthroplasty;  Surgeon: Issa Cunha MD;  Location: [facility] OR     COLONOSCOPY  03/09/2013    Procedure: COLONOSCOPY;  COLONOSCOPY;  Surgeon: Chau Hogan MD;  Location:  GI     CV HEART CATHETERIZATION WITH POSSIBLE INTERVENTION Left 03/05/2019    Procedure: Coronary Angiogram;  Surgeon: Nas Linda MD;  Location:  HEART CARDIAC CATH LAB     Diastasis recti repair  1985     EXTRACAPSULAR CATARACT EXTRATION WITH INTRAOCULAR LENS IMPLANT Left 03/13/2017     EXTRACAPSULAR CATARACT EXTRATION WITH INTRAOCULAR LENS IMPLANT Right      FOOT SURGERY  04/2013    cyst removal      HERNIA REPAIR  07/13/2004    ventral      ORIF Shoulder Left      Ventral hernia repair NOS  1987       CURRENT MEDICATIONS:    No current facility-administered medications for this encounter.     Current Outpatient Medications   Medication     acetaminophen (TYLENOL) 325 MG tablet     amLODIPine (NORVASC) 5 MG tablet     aspirin (ASA) 81 MG EC tablet     atorvastatin (LIPITOR) 40 MG tablet     blood glucose (NO BRAND SPECIFIED) lancets standard     blood glucose (NO BRAND SPECIFIED) test strip     blood glucose monitoring (NO BRAND SPECIFIED) meter device kit     cefdinir (OMNICEF) 300 MG capsule     celecoxib (CELEBREX) 200 MG capsule     clindamycin (CLEOCIN) 300 MG capsule     colchicine (COLCYRS) 0.6 MG tablet     Ferrous Gluconate 240 (27 Fe) MG TABS     finasteride (PROSCAR) 5 MG tablet     fluticasone (FLONASE) 50 MCG/ACT nasal spray     HYDROcodone-acetaminophen (NORCO) 5-325 MG tablet     isosorbide mononitrate (IMDUR) 30 MG 24 hr tablet     labetalol (NORMODYNE) 200 MG tablet     Lidocaine (LIDOCARE) 4 % Patch     lisinopril (ZESTRIL) 10 MG tablet      "loratadine (CLARITIN) 10 MG tablet     metFORMIN (GLUCOPHAGE) 1000 MG tablet     multivitamin w/minerals (THERA-VIT-M) tablet     Semaglutide (RYBELSUS) 7 MG TABS     tamsulosin (FLOMAX) 0.4 MG 24 hr capsule     tiZANidine (ZANAFLEX) 2 MG tablet     torsemide (DEMADEX) 20 MG tablet       ALLERGIES:  Allergies   Allergen Reactions     Penicillins Anaphylaxis     22 - tolerated cefepime      Sulfa Drugs      \"itchy rash, swelling of face and hands\"     Ancef [Cefazolin] Rash     Sulfatolamide Rash       FAMILY HISTORY:  Family History   Problem Relation Age of Onset     Family History Negative Mother          88 yo     Cancer Father          76 yo brain     Diabetes Maternal Grandfather          89 yo     Alcohol/Drug Paternal Grandfather              Colon Cancer No family hx of        SOCIAL HISTORY:   Social History     Socioeconomic History     Marital status:    Occupational History     Employer: SELF   Tobacco Use     Smoking status: Former Smoker     Packs/day: 0.00     Quit date: 3/17/1993     Years since quittin.4     Smokeless tobacco: Never Used   Substance and Sexual Activity     Alcohol use: Not Currently     Drug use: No     Sexual activity: Never       VITALS:  BP (!) 147/70   Pulse 84   Temp 97.9  F (36.6  C) (Temporal)   Resp 18   Ht 1.829 m (6')   Wt 103 kg (227 lb)   SpO2 99%   BMI 30.79 kg/m      PHYSICAL EXAM:  Physical Exam  Vitals and nursing note reviewed.   Constitutional:       Appearance: Normal appearance.   HENT:      Head: Normocephalic and atraumatic.      Right Ear: External ear normal.      Left Ear: External ear normal.      Nose: Nose normal.      Mouth/Throat:      Mouth: Mucous membranes are moist.   Eyes:      Extraocular Movements: Extraocular movements intact.      Conjunctiva/sclera: Conjunctivae normal.      Pupils: Pupils are equal, round, and reactive to light.   Cardiovascular:      Rate and Rhythm: Normal rate and " regular rhythm.   Pulmonary:      Effort: Pulmonary effort is normal.      Breath sounds: Normal breath sounds. No wheezing or rales.   Abdominal:      General: Abdomen is flat. There is no distension.      Palpations: Abdomen is soft.      Tenderness: There is no abdominal tenderness. There is no guarding.   Musculoskeletal:         General: Normal range of motion.      Cervical back: Normal range of motion and neck supple.      Right lower leg: Swelling and tenderness present. No edema.      Left lower leg: No edema.      Comments: Swollen mild tenderness of the right popliteal fossa  RLE erythematous    Lymphadenopathy:      Cervical: No cervical adenopathy.   Skin:     General: Skin is warm and dry.   Neurological:      General: No focal deficit present.      Mental Status: He is alert and oriented to person, place, and time. Mental status is at baseline.      Comments: No gross focal neurologic deficits   Psychiatric:         Mood and Affect: Mood normal.         Behavior: Behavior normal.         Thought Content: Thought content normal.          LAB:  All pertinent labs reviewed and interpreted.  Results for orders placed or performed during the hospital encounter of 08/30/22   US Lower Extremity Venous Duplex Right    Impression    IMPRESSION:  1.  No deep venous thrombosis in the right lower extremity.       RADIOLOGY:  Reviewed all pertinent imaging. Please see official radiology report.  US Lower Extremity Venous Duplex Right   Final Result   IMPRESSION:   1.  No deep venous thrombosis in the right lower extremity.          I, Jacklyn Butterfield, am serving as a scribe to document services personally performed by Dr. Mercado based on my observation and the provider's statements to me. I, Omari Mercado MD attest that Jacklyn Butterfield is acting in a scribe capacity, has observed my performance of the services and has documented them in accordance with my direction.    Omari Mercado M.D.  Emergency  Medicine  [facility] EMERGENCY ROOM  [address] [place] [id number] [phone]  Dept: [phone]     Omari Mercado MD  08/30/22 [id number]

## 2022-08-30 NOTE — ED TRIAGE NOTES
Pt arrives to ED with c/o sudden onset of pain to right leg (behind the knee) yesterday around 1300. Worse when walking. Pt concerned for a blood clot. Pt was released yesterday for cellulitis in that same leg. Discharged with antibiotics.      Triage Assessment     Row Name 08/30/22 1043       Triage Assessment (Adult)    Airway WDL WDL       Respiratory WDL    Respiratory WDL WDL       Skin Circulation/Temperature WDL    Skin Circulation/Temperature WDL WDL       Cardiac WDL    Cardiac WDL WDL       Peripheral/Neurovascular WDL    Peripheral Neurovascular WDL WDL       Cognitive/Neuro/Behavioral WDL    Cognitive/Neuro/Behavioral WDL WDL

## 2022-08-31 LAB
BACTERIA BLD CULT: NO GROWTH
BACTERIA BLD CULT: NO GROWTH

## 2022-09-01 ENCOUNTER — PATIENT OUTREACH (OUTPATIENT)
Dept: CARE COORDINATION | Facility: CLINIC | Age: 77
End: 2022-09-01

## 2022-09-01 ENCOUNTER — TRANSFERRED RECORDS (OUTPATIENT)
Dept: HEALTH INFORMATION MANAGEMENT | Facility: CLINIC | Age: 77
End: 2022-09-01

## 2022-09-01 NOTE — PROGRESS NOTES
"Clinic Care Coordination Contact  [facility]: Post-Discharge Note  SITUATION                                                      Admission:    Admission Date: 08/30/22   Reason for Admission: Leg pain  Discharge:   Discharge Date: 08/30/22  Discharge Diagnosis: Strain of right hamstring muscle    BACKGROUND                                                      Per hospital discharge summary and inpatient provider notes:Lance Ibrahim is a 77 year old male with a pertinent history of hypertension, type 2 diabetes mellitus, cardiomyopathy, CKD stage 3, cellulitis of right lower extremity, TIA, PVD, and CAD who presents to this ED by walk-in for evaluation of leg pain.      Per chart review: On 8/25/2022 - 8/29/2022 at [facility], patient presented with right lower extremity cellulitis unresponsive to oral doxycycline therapy. Admitted on vancomycin and IV cefepime. Patient did slowly respond to IV antibiotics. On the time of discharge had minimal erythema and edema had resolved. Cleared for discharge. We will have him on 7 more days of oral antibiotics      ASSESSMENT           Discharge Assessment  How are you doing now that you are home?: \" I am doing ok so far\"  How are your symptoms? (Red Flag symptoms escalate to triage hotline per guidelines): Improved  Do you feel your condition is stable enough to be safe at home until your provider visit?: Yes  Does the patient have their discharge instructions? : Yes  Does the patient have questions regarding their discharge instructions? : No  Were you started on any new medications or were there changes to any of your previous medications? : Yes  Does the patient have all of their medications?: Yes  Do you have questions regarding any of your medications? : No  Do you have all of your needed medical supplies or equipment (DME)?  (i.e. oxygen tank, CPAP, cane, etc.): Yes  Discharge follow-up appointment scheduled within 14 calendar days? : Yes  Discharge Follow " Up Appointment Date: 09/09/22  Discharge Follow Up Appointment Scheduled with?: Primary Care Provider    Post-op ([facility] Only)  If the patient had a surgery or procedure, do they have any questions for a nurse?: No           PLAN                                                       Outpatient Plan: Schedule an appointment with Anh Spivey NP (Nurse Practitioner - Adult Health); As needed         Future Appointments   Date Time Provider Department Center   9/9/2022 11:20 AM Mercedes Shahid MD [facility]         For any urgent concerns, please contact our 24 hour nurse triage line: [phone] ([phone])         Laurel Clark

## 2022-09-04 DIAGNOSIS — M10.9 GOUTY ARTHROPATHY: ICD-10-CM

## 2022-09-05 NOTE — TELEPHONE ENCOUNTER
"Last Written Prescription Date:  8/8/22  Last Fill Quantity: 30,  # refills: 1   Last office visit provider:  7/13/22     Requested Prescriptions   Pending Prescriptions Disp Refills     colchicine (COLCYRS) 0.6 MG tablet [Pharmacy Med Name: COLCHICINE 0.6 MG TABLET] 30 tablet 1     Sig: TAKE 1 TABLET BY MOUTH EVERY DAY       Gout Agents Protocol Passed - 9/4/2022  1:30 PM        Passed - CBC on file in past 12 months     Recent Labs   Lab Test 08/29/22  0802 08/26/22  0942   WBC  --  10.5   RBC  --  3.52*   HGB  --  9.9*   HCT  --  31.3*    408                 Passed - ALT on file in past 12 months     Recent Labs   Lab Test 06/16/22  0934   ALT 16             Passed - Has Uric Acid on file in past 12 months and value is less than 6     Recent Labs   Lab Test 12/07/21  1209   URIC 5.7     If level is 6mg/dL or greater, ok to refill one time and refer to provider.           Passed - Recent (12 mo) or future (30 days) visit within the authorizing provider's specialty     Patient has had an office visit with the authorizing provider or a provider within the authorizing providers department within the previous 12 mos or has a future within next 30 days. See \"Patient Info\" tab in inbasket, or \"Choose Columns\" in Meds & Orders section of the refill encounter.              Passed - Medication is active on med list        Passed - Patient is age 18 or older        Passed - Normal serum creatinine on file in the past 12 months     Recent Labs   Lab Test 08/29/22  0802 12/04/21  0852 12/03/21  1724   CR 1.24   < >  --    CRPOC  --   --  3.6*    < > = values in this interval not displayed.       Ok to refill medication if creatinine is low               Indigo De Los Santos, RN 09/04/22 7:41 PM  "

## 2022-09-06 RX ORDER — COLCHICINE 0.6 MG/1
TABLET ORAL
Qty: 30 TABLET | Refills: 1 | Status: SHIPPED | OUTPATIENT
Start: 2022-09-06 | End: 2022-09-26

## 2022-09-09 ENCOUNTER — OFFICE VISIT (OUTPATIENT)
Dept: FAMILY MEDICINE | Facility: CLINIC | Age: 77
End: 2022-09-09
Payer: COMMERCIAL

## 2022-09-09 VITALS
SYSTOLIC BLOOD PRESSURE: 105 MMHG | HEART RATE: 100 BPM | BODY MASS INDEX: 31.03 KG/M2 | OXYGEN SATURATION: 97 % | WEIGHT: 228.8 LBS | DIASTOLIC BLOOD PRESSURE: 54 MMHG | TEMPERATURE: 98.7 F

## 2022-09-09 DIAGNOSIS — E11.621 DIABETIC ULCER OF TOE OF RIGHT FOOT ASSOCIATED WITH TYPE 2 DIABETES MELLITUS, UNSPECIFIED ULCER STAGE (H): ICD-10-CM

## 2022-09-09 DIAGNOSIS — L97.519 DIABETIC ULCER OF TOE OF RIGHT FOOT ASSOCIATED WITH TYPE 2 DIABETES MELLITUS, UNSPECIFIED ULCER STAGE (H): ICD-10-CM

## 2022-09-09 DIAGNOSIS — E11.9 TYPE 2 DIABETES, HBA1C GOAL < 7% (H): ICD-10-CM

## 2022-09-09 DIAGNOSIS — L03.115 CELLULITIS OF RIGHT LOWER EXTREMITY: Primary | ICD-10-CM

## 2022-09-09 LAB — HBA1C MFR BLD: 7.2 % (ref 0–5.6)

## 2022-09-09 PROCEDURE — 99214 OFFICE O/P EST MOD 30 MIN: CPT | Performed by: FAMILY MEDICINE

## 2022-09-09 PROCEDURE — 36415 COLL VENOUS BLD VENIPUNCTURE: CPT | Performed by: FAMILY MEDICINE

## 2022-09-09 PROCEDURE — 83036 HEMOGLOBIN GLYCOSYLATED A1C: CPT | Performed by: FAMILY MEDICINE

## 2022-09-09 RX ORDER — OXYCODONE HYDROCHLORIDE 5 MG/1
TABLET ORAL
COMMUNITY
Start: 2022-08-30 | End: 2022-09-09

## 2022-09-09 RX ORDER — CLINDAMYCIN HCL 300 MG
300 CAPSULE ORAL 4 TIMES DAILY
Qty: 28 CAPSULE | Refills: 0 | Status: SHIPPED | OUTPATIENT
Start: 2022-09-09 | End: 2022-09-16

## 2022-09-09 RX ORDER — CEFDINIR 300 MG/1
300 CAPSULE ORAL 2 TIMES DAILY
Qty: 14 CAPSULE | Refills: 0 | Status: SHIPPED | OUTPATIENT
Start: 2022-09-09 | End: 2022-09-09

## 2022-09-09 RX ORDER — CEFDINIR 300 MG/1
300 CAPSULE ORAL 2 TIMES DAILY
Qty: 14 CAPSULE | Refills: 0 | Status: SHIPPED | OUTPATIENT
Start: 2022-09-09 | End: 2022-09-29

## 2022-09-09 ASSESSMENT — PAIN SCALES - GENERAL: PAINLEVEL: NO PAIN (0)

## 2022-09-09 NOTE — PATIENT INSTRUCTIONS
Follow up with Dr. Adamson next week to recheck    Schedule appt at wound clinic    Elevate legs as much as possible    Continue antibiotics for 1 more week

## 2022-09-09 NOTE — PROGRESS NOTES
Assessment & Plan     Cellulitis of right lower extremity  This is slightly improved since he left the hospital.  Still with significant erythema, edema and warmth on examination.  Discussed importance of elevating legs is much as possible during the day.  Will continue oral antibiotic.  Refill provided on cefdinir and clindamycin.  We will arrange for him to follow-up with PCP next week to reevaluate.  Recommend wound care consultation to help with healing of diabetic ulcer on bottom of right great toe.  Referral is placed  - Wound Care Referral  - cefdinir (OMNICEF) 300 MG capsule  Dispense: 14 capsule; Refill: 0  - clindamycin (CLEOCIN) 300 MG capsule  Dispense: 28 capsule; Refill: 0    Diabetic ulcer of toe of right foot associated with type 2 diabetes mellitus, unspecified ulcer stage (H)    - Wound Care Referral    Type 2 diabetes, HbA1c goal < 7% (H)  He will continue current medications and follow-up with PCP next week  - Hemoglobin A1c  - Hemoglobin A1c                   No follow-ups on file.    Mercedes Shahid MD  [facility]    Bandar Lucas is a 77 year old, presenting for the following health issues:  Hospital F/U and Diabetes      HPI     He comes in today for hospital follow-up.  Recently hospitalized at [facility] for right lower extremity cellulitis.  He is a type II diabetic.  He has a diabetic ulcer on the bottom of his right great toe.  He has been seeing a podiatrist for this but states that it has not healed despite treatments for the past year and a half.  He believes that this is the source of bacteria that is causing the cellulitis.  He was initially treated with oral doxycycline but did not respond to oral therapy so presented to the emergency department.  He was admitted for IV antibiotics.  Cellulitis improved and he was discharged on oral cefdinir and clindamycin.  He completed antibiotics.  He reports no side effects with antibiotics such as diarrhea.  His  "cellulitis has improved but has not completely resolved.  He still has redness and swelling of the right lower extremity.  He does not have much pain.  He is not taking any pain medications.  He does not have fevers or chills.  He recently completed a course of prednisone and states that his blood sugars have been elevated due to that.  He would like his A1c checked today.  Was started on Rybelsus by his PCP in June.  States that he is elevating his legs at night but is not elevating much during the day.  He is applying a topical cream to his lower extremities.  Review of systems is assessed and is otherwise negative.  No other concerns or questions.    Post Discharge Outreach 9/1/2022   Admission Date 8/30/2022   Reason for Admission Leg pain   Discharge Date 8/30/2022   Discharge Diagnosis Strain of right hamstring muscle   How are you doing now that you are home? \" I am doing ok so far\"   How are your symptoms? (Red Flag symptoms escalate to triage hotline per guidelines) Improved   Do you feel your condition is stable enough to be safe at home until your provider visit? Yes   Does the patient have their discharge instructions?  Yes   Does the patient have questions regarding their discharge instructions?  No   Were you started on any new medications or were there changes to any of your previous medications?  Yes   Does the patient have all of their medications? Yes   Do you have questions regarding any of your medications?  No   Do you have all of your needed medical supplies or equipment (DME)?  (i.e. oxygen tank, CPAP, cane, etc.) Yes   Discharge follow-up appointment scheduled within 14 calendar days?  Yes   Discharge Follow Up Appointment Date 9/9/2022   Discharge Follow Up Appointment Scheduled with? Primary Care Provider     Hospital Follow-up Visit:    Hospital/Nursing Home/IP Rehab Facility: [facility]  Date of Admission: 8/25/22  Date of Discharge: 8/29/22  Reason(s) for " Admission: cellulities    Was your hospitalization related to COVID-19? No   Problems taking medications regularly:  None  Medication changes since discharge: None  Problems adhering to non-medication therapy:  None    Summary of hospitalization:  [facility] discharge summary reviewed  Diagnostic Tests/Treatments reviewed.  Follow up needed: none  Other Healthcare Providers Involved in Patient s Care:         None  Update since discharge: improved.         Post Medication Reconciliation Status:        Plan of care communicated with patient                   Review of Systems         Objective    /54 (BP Location: Right arm, Cuff Size: Adult Regular)   Pulse 100   Temp 98.7  F (37.1  C) (Temporal)   Wt 103.8 kg (228 lb 12.8 oz)   SpO2 97%   BMI 31.03 kg/m    Body mass index is 31.03 kg/m .  Physical Exam   GENERAL: healthy, alert and no distress  PSYCH: mentation appears normal, affect normal/bright  Ext: there is erythema, warmth and edema of the right lower extremity extending from the foot to below the knee. There is dryness and peeling of skin. There is an ulcer on the bottom of his right great toe

## 2022-09-15 ENCOUNTER — OFFICE VISIT (OUTPATIENT)
Dept: FAMILY MEDICINE | Facility: CLINIC | Age: 77
End: 2022-09-15
Payer: COMMERCIAL

## 2022-09-15 ENCOUNTER — OFFICE VISIT (OUTPATIENT)
Dept: VASCULAR SURGERY | Facility: CLINIC | Age: 77
End: 2022-09-15
Attending: FAMILY MEDICINE
Payer: COMMERCIAL

## 2022-09-15 VITALS
TEMPERATURE: 98.6 F | SYSTOLIC BLOOD PRESSURE: 132 MMHG | BODY MASS INDEX: 32.64 KG/M2 | HEIGHT: 72 IN | OXYGEN SATURATION: 98 % | DIASTOLIC BLOOD PRESSURE: 74 MMHG | HEART RATE: 85 BPM | WEIGHT: 241 LBS

## 2022-09-15 VITALS
HEART RATE: 88 BPM | TEMPERATURE: 98.2 F | DIASTOLIC BLOOD PRESSURE: 72 MMHG | WEIGHT: 228 LBS | HEIGHT: 72 IN | SYSTOLIC BLOOD PRESSURE: 128 MMHG | RESPIRATION RATE: 18 BRPM | BODY MASS INDEX: 30.88 KG/M2

## 2022-09-15 DIAGNOSIS — Z79.899 ENCOUNTER FOR LONG-TERM (CURRENT) USE OF MEDICATIONS: Primary | ICD-10-CM

## 2022-09-15 DIAGNOSIS — E11.621 DIABETIC ULCER OF TOE OF RIGHT FOOT ASSOCIATED WITH TYPE 2 DIABETES MELLITUS, UNSPECIFIED ULCER STAGE (H): Primary | ICD-10-CM

## 2022-09-15 DIAGNOSIS — L03.115 CELLULITIS OF RIGHT LOWER EXTREMITY: ICD-10-CM

## 2022-09-15 DIAGNOSIS — L97.519 DIABETIC ULCER OF TOE OF RIGHT FOOT ASSOCIATED WITH TYPE 2 DIABETES MELLITUS, UNSPECIFIED ULCER STAGE (H): Primary | ICD-10-CM

## 2022-09-15 LAB
ERYTHROCYTE [DISTWIDTH] IN BLOOD BY AUTOMATED COUNT: 16.1 % (ref 10–15)
HCT VFR BLD AUTO: 34.4 % (ref 40–53)
HGB BLD-MCNC: 10.8 G/DL (ref 13.3–17.7)
MCH RBC QN AUTO: 28.3 PG (ref 26.5–33)
MCHC RBC AUTO-ENTMCNC: 31.4 G/DL (ref 31.5–36.5)
MCV RBC AUTO: 90 FL (ref 78–100)
PLATELET # BLD AUTO: 255 10E3/UL (ref 150–450)
RBC # BLD AUTO: 3.82 10E6/UL (ref 4.4–5.9)
WBC # BLD AUTO: 8.9 10E3/UL (ref 4–11)

## 2022-09-15 PROCEDURE — 99213 OFFICE O/P EST LOW 20 MIN: CPT | Mod: 25 | Performed by: PODIATRIST

## 2022-09-15 PROCEDURE — 11042 DBRDMT SUBQ TIS 1ST 20SQCM/<: CPT | Performed by: PODIATRIST

## 2022-09-15 PROCEDURE — 99214 OFFICE O/P EST MOD 30 MIN: CPT | Performed by: FAMILY MEDICINE

## 2022-09-15 PROCEDURE — G0463 HOSPITAL OUTPT CLINIC VISIT: HCPCS

## 2022-09-15 PROCEDURE — 85027 COMPLETE CBC AUTOMATED: CPT | Performed by: FAMILY MEDICINE

## 2022-09-15 PROCEDURE — 36415 COLL VENOUS BLD VENIPUNCTURE: CPT | Performed by: FAMILY MEDICINE

## 2022-09-15 ASSESSMENT — PAIN SCALES - GENERAL: PAINLEVEL: NO PAIN (0)

## 2022-09-15 NOTE — PROGRESS NOTES
Assessment & Plan     Encounter for long-term (current) use of medications  Dosages for metformin and Rybelsus were reviewed with the patient he is on the 7 mg of Rybelsus and has noticed marked improvement of his A1c already and his blood sugars (down from 1 60-1 20)    Cellulitis of right lower extremity  Patient has moderate brawny edema of the right lower extremity with pinkish color below the knee and loss of sensation but this appears to be improved from the photographs of the leg after and during his hospitalization at [facility]; he is to keep his infectious disease/vascular consult this afternoon but I anticipate that their opinion will be that he is doing well  - CBC with platelets  - CBC with platelets                   No follow-ups on file.    Rickey Adamson MD  [facility]   Lance is a 77 year old accompanied by his , presenting for the following health issues:  Recheck Medication and Cellulitis      HPI   Lance is being seen here in primary care for follow-up of his type 2 diabetes; we added Rybelsus and titrated him up from 3 mg to 7 mg.  He is already noticed an improvement in his A1c which is dropped 1-1/2 points in the last 2 months.  His average blood sugars have dropped from 150 down to 120.  He does have a cellulitis and infection of his right lower extremity being treated with Keflex and Cleocin.  He still has some brawny edema and pinkish swelling of his right lower extremity with loss of sensation but I do not see any active necrosis.  He seen vascular consultation this afternoon and I expect they will continue his antibiotics.  We will follow along with him I did do a hemogram his white count dropped from 18.2-12.2 we will see what it is today he overall is feeling much better.  Medication review was done        Review of Systems   Constitutional, HEENT, cardiovascular, pulmonary, gi and gu systems are negative, except as otherwise noted.       Objective    /74   Pulse 85   Temp 98.6  F (37  C)   Ht 1.829 m (6')   Wt 109.3 kg (241 lb)   SpO2 98%   BMI 32.69 kg/m    Body mass index is 32.69 kg/m .  Physical Exam   GENERAL: healthy, alert and no distress  NECK: no adenopathy, no asymmetry, masses, or scars and thyroid normal to palpation  RESP: lungs clear to auscultation - no rales, rhonchi or wheezes  CV: regular rate and rhythm, normal S1 S2, no S3 or S4, no murmur, click or rub, no peripheral edema and peripheral pulses strong  ABDOMEN: soft, nontender, no hepatosplenomegaly, no masses and bowel sounds normal  MS: no gross musculoskeletal defects noted, no edema    Extremity-she does have brawny edema of the right lower extremity with pinkish cast to the skin not acutely red no oozing from wound eschar does appear to be tightening

## 2022-09-15 NOTE — PROGRESS NOTES
FOOT AND ANKLE SURGERY/PODIATRY CONSULT NOTE        ASSESSMENT:   Cellulitis right leg   Diabetic Ulceration right hallux      TREATMENT:  -The right hallux ulceration is stable. I discussed the principles of wound healing today including the importance of limited walking on the involved limb, good vascular perfusion, good glycemic control and the absence of infection.     -I recommend non-weight bearing while at home with wheelchair, darco shoe.     -He continues to have mild erythema on the right leg. I recommend he finish current course of antibiotics and have referred him to Infectious Disease for consultation.     -After discussion of risk factors and consent obtained 2% Lidocaine HCL jelly was applied, under clean conditions, the right and foot ulceration(s) were debrided using .into the subcutaneous tissue.  Devitalized and nonviable tissue, along with any fibrin and slough, was removed to improve granulation tissue formation, stimulate wound healing, decrease overall bacteria load, disrupt biofilm formation and decrease edge senescence. Wound drainage was none No. Total excisional debridement was 2.6 sq cm into the subcutaneous tissue with a depth of 0.2 cm.   Ulcers were improved afterwards and .  Measures were as noted on the flow sheet. Collagen with a gauze dresssing was applied. He will continue to apply Collagen with a gauze dresssing as directed.    -He will follow-up with me in 3 weeks, or sooner if he is unable to be seen by ID within the next 7-10 days or with any worsening of right leg erythema.     Nakul Salazar DPM  [facility] [place]      HPI: Lance Ibrahim was seen today for a right hallux ulceration. I last saw him during his recent hospitalization. He is currently taking antibiotics as directed. He admits walking on the right foot while at his assisted living facility.     Past Medical History:   Diagnosis Date     Arthritis     osteoarthritis knees     BPH       "CAD (coronary artery disease)     subtotal occlusion in the small distal LAD      Cardiomyopathy      Cerebral artery occlusion with cerebral infarction     TIA 1993, no residual     Chronic kidney disease      Chronic rhinitis      HTN (hypertension)      Hyperlipidemia      Kidney stone      PVD (peripheral vascular disease)      Sleep apnea     doesn't use cpap     TIA (transient ischaemic attack) 1993     Type 2 diabetes mellitus - 11/08/2018     Ureteral stone        Past Surgical History:   Procedure Laterality Date     AMPUTATE TOE(S) Left 10/15/2018    Procedure: AMPUTATE TOE(S);  Left third toe amputation;  Surgeon: Ayaka Azevedo DPM, Podiatry/Foot and Ankle Surgery;  Location: [facility] OR     ARTHROPLASTY KNEE Right 12/07/2018    Procedure: Right total knee arthroplasty;  Surgeon: Issa Cunha MD;  Location:  OR     COLONOSCOPY  03/09/2013    Procedure: COLONOSCOPY;  COLONOSCOPY;  Surgeon: Chau Hogan MD;  Location:  GI     CV HEART CATHETERIZATION WITH POSSIBLE INTERVENTION Left 03/05/2019    Procedure: Coronary Angiogram;  Surgeon: Nas Linda MD;  Location:  HEART CARDIAC CATH LAB     Diastasis recti repair  1985     EXTRACAPSULAR CATARACT EXTRATION WITH INTRAOCULAR LENS IMPLANT Left 03/13/2017     EXTRACAPSULAR CATARACT EXTRATION WITH INTRAOCULAR LENS IMPLANT Right      FOOT SURGERY  04/2013    cyst removal      HERNIA REPAIR  07/13/2004    ventral      ORIF Shoulder Left      Ventral hernia repair NOS  1987        Allergies   Allergen Reactions     Penicillins Anaphylaxis     8/25/22 - tolerated cefepime      Sulfa Drugs      \"itchy rash, swelling of face and hands\"     Ancef [Cefazolin] Rash     Sulfatolamide Rash         Current Outpatient Medications:      amLODIPine (NORVASC) 5 MG tablet, Take by mouth every 24 hours, Disp: , Rfl:      aspirin (ASA) 81 MG EC tablet, Take by mouth every 24 hours, Disp: , Rfl:      atorvastatin (LIPITOR) 40 MG tablet, Take 40 mg by mouth every " 24 hours, Disp: , Rfl:      blood glucose (NO BRAND SPECIFIED) lancets standard, Use to test blood sugar 2 times daily or as directed., Disp: 100 lancet, Rfl: 4     blood glucose (NO BRAND SPECIFIED) test strip, Use to test blood sugar 2 times daily or as directed., Disp: 100 strip, Rfl: 4     blood glucose monitoring (NO BRAND SPECIFIED) meter device kit, Use to test blood sugar 2 times daily or as directed., Disp: 1 kit, Rfl: 0     cefdinir (OMNICEF) 300 MG capsule, Take 1 capsule (300 mg) by mouth 2 times daily, Disp: 14 capsule, Rfl: 0     celecoxib (CELEBREX) 200 MG capsule, TAKE 1 CAPSULE BY MOUTH TWICE A DAY, Disp: , Rfl:      clindamycin (CLEOCIN) 300 MG capsule, Take 1 capsule (300 mg) by mouth 4 times daily for 7 days, Disp: 28 capsule, Rfl: 0     colchicine (COLCYRS) 0.6 MG tablet, TAKE 1 TABLET BY MOUTH EVERY DAY, Disp: 30 tablet, Rfl: 1     Ferrous Gluconate 240 (27 Fe) MG TABS, Take 1 tablet by mouth daily , Disp: , Rfl:      finasteride (PROSCAR) 5 MG tablet, Take by mouth every 24 hours, Disp: , Rfl:      fluticasone (FLONASE) 50 MCG/ACT nasal spray, Spray 1-2 sprays in nostril every 24 hours, Disp: , Rfl:      isosorbide mononitrate (IMDUR) 30 MG 24 hr tablet, Take 1 tablet (30 mg) by mouth daily, Disp: 90 tablet, Rfl: 3     labetalol (NORMODYNE) 200 MG tablet, Take 200 mg by mouth 2 times daily, Disp: , Rfl:      Lidocaine (LIDOCARE) 4 % Patch, Place 1 patch onto the skin every 24 hours To prevent lidocaine toxicity, patient should be patch free for 12 hrs daily. (Patient taking differently: Place 1 patch onto the skin every 24 hours To prevent lidocaine toxicity, patient should be patch free for 12 hrs daily.  To back every am), Disp: , Rfl:      lisinopril (ZESTRIL) 10 MG tablet, Take 10 mg by mouth every 24 hours, Disp: , Rfl:      loratadine (CLARITIN) 10 MG tablet, Take by mouth every 24 hours, Disp: , Rfl:      metFORMIN (GLUCOPHAGE) 1000 MG tablet, Take 1,000 mg by mouth 2 times daily (with  meals), Disp: , Rfl:      multivitamin w/minerals (THERA-VIT-M) tablet, , Disp: , Rfl:      Semaglutide (RYBELSUS) 7 MG TABS, Take 7 mg by mouth daily, Disp: 90 tablet, Rfl: 3     tamsulosin (FLOMAX) 0.4 MG 24 hr capsule, Take 1 capsule by mouth daily., Disp: , Rfl:      tiZANidine (ZANAFLEX) 2 MG tablet, Take 4 mg by mouth At Bedtime, Disp: , Rfl:      torsemide (DEMADEX) 20 MG tablet, Take 1 tablet (20 mg) by mouth daily, Disp: 90 tablet, Rfl: 2    Social History     Social History Narrative     Not on file       Family History   Problem Relation Age of Onset     Family History Negative Mother          86 yo     Cancer Father          74 yo brain     Diabetes Maternal Grandfather          89 yo     Alcohol/Drug Paternal Grandfather              Colon Cancer No family hx of        Review of Systems - 10 point Review of Systems is negative except for right hallux ulcer which is noted in HPI.      OBJECTIVE:  Appearance: alert, well appearing, and in no distress.    /72   Pulse 88   Temp 98.2  F (36.8  C)   Resp 18   Ht 6' (1.829 m)   Wt 228 lb (103.4 kg)   BMI 30.92 kg/m      BMI= Body mass index is 30.92 kg/m .    General appearance: Patient is alert and fully cooperative with history & exam.  No sign of distress is noted during the visit.     Psychiatric: Affect is pleasant & appropriate.  Patient appears motivated to improve health.     Respiratory: Breathing is regular & unlabored while sitting.     HEENT: Hearing is intact to spoken word.  Speech is clear.  No gross evidence of visual impairment that would impact ambulation.    Vascular: Dorsalis pedis non-palpableRight.  Dermatologic:   Wound 10/14/18 Foot Abrasion(s) GRAYISH DISCOLORATION (Active)       Wound 18 Left;Lower Foot Amputation blackened area third left toe amputation incision site (Active)       Wound 20 Left Toe (Comment  which one) Ulceration Left foot pinky toe (Active)       Wound  07/27/20 Right Toe (Comment  which one) Ulceration Right great toe wound  (Active)       Wound Toe (Comment  which one) Ulceration (Active)       Wound Foot Ulceration (Active)       Wound Foot Ulceration (Active)       Rash 10/21/18 1543 Bilateral other (see comments) (Active)       Rash 07/27/20 2015 Left lower leg other (see comments) (Active)       VASC Wound right hallux (Active)   Pre Size Length 1 09/15/22 1300   Pre Size Width 2.6 09/15/22 1300   Pre Size Depth 0.2 09/15/22 1300   Pre Total Sq cm 2.6 09/15/22 1300       Incision/Surgical Site 10/15/18 Left Foot (Active)       Incision/Surgical Site 12/07/18 Right Knee (Active)   Granular base right hallux ulceration. Mild rubor/erythema right leg.   Neurologic: Diminished to light touch Right.  Musculoskeletal: Contracted digits noted Right.    @[facility](10,16,17)@    Imaging:     US KAREN Doppler No Exercise 1-2 Levels Bilateral    Result Date: 8/26/2022  EXAM: RESTING ANKLE-BRACHIAL INDICES (ABIs) LOCATION: [facility] DATE/TIME: 8/26/2022 4:49 PM INDICATION: Peripheral arterial disease. Presurgical mapping. Diabetic. Previous smoker. Hypertension. COMPARISON: None. KAREN FINDINGS: RIGHT Brachial: 135 Ankle (PT): 180 Index: 1.33 Ankle (DP): 184 Index: 1.36 Digit: 108 Index: 0.80 LEFT Brachial: -- Ankle (PT): 188 Index: 1.39 Ankle (DP): 187 Index: 1.39 Digit: 128 Index: 0.95 The right KAREN at rest is 1.36. The left KAREN at rest is 1.39.  WAVEFORMS: The dorsalis pedis and posterior tibial arteries are normal bilaterally.     IMPRESSION: 1.  RIGHT LOWER EXTREMITY: KAREN at rest is normal. 2.  LEFT LOWER EXTREMITY: KAREN at rest is normal.    XR Toe Right G/E 2 Views    Result Date: 8/25/2022  EXAM: XR TOE RIGHT G/E 2 VIEWS LOCATION: [facility] DATE/TIME: 8/25/2022 7:43 PM INDICATION: Great toe ulcer. Cellulitis. COMPARISON: 07/28/2021     IMPRESSION: Unchanged severe arthritis at the right first MTP and metatarsal sesamoid  joints with dorsal spurring. No acute fracture or dislocation identified. Hindfoot and midfoot joint spaces are unchanged. Persistent dorsal foot soft tissue swelling with heel spur. Great toe soft tissue swelling with likely soft tissue ulcer distally and plantar. Nothing specific radiographically for great toe osteomyelitis.    US Lower Extremity Venous Duplex Right    Result Date: 8/30/2022  EXAM: US LOWER EXTREMITY VENOUS DUPLEX RIGHT LOCATION: [facility] DATE/TIME: 8/30/2022 11:37 AM INDICATION: popliteal pain COMPARISON: None. TECHNIQUE: Venous Duplex ultrasound of the right lower extremity with and without compression, augmentation and duplex. Color flow and spectral Doppler with waveform analysis performed. FINDINGS: Exam includes the common femoral, femoral, popliteal, and contralateral common femoral veins as well as segmentally visualized deep calf veins and greater saphenous vein. RIGHT: No deep vein thrombosis. No superficial thrombophlebitis. No popliteal cyst.     IMPRESSION: 1.  No deep venous thrombosis in the right lower extremity.    US Lower Extremity Venous Duplex Right    Result Date: 8/17/2022  EXAM: US LOWER EXTREMITY VENOUS DUPLEX RIGHT LOCATION: [facility] DATE/TIME: 8/17/2022 9:36 AM INDICATION: Right leg swelling. COMPARISON: 02/17/2019. TECHNIQUE: Venous Duplex ultrasound of the right lower extremity with and without compression, augmentation and duplex. Color flow and spectral Doppler with waveform analysis performed. FINDINGS: Exam includes the common femoral, femoral, popliteal, and contralateral common femoral veins as well as segmentally visualized deep calf veins and greater saphenous vein. RIGHT: No deep vein thrombosis. No superficial thrombophlebitis. No popliteal cyst.     IMPRESSION: 1.  Negative right leg venous Doppler.

## 2022-09-15 NOTE — PATIENT INSTRUCTIONS
Important lnstructions    Please bring pt his meals to his room, this will help limit pt from walking on the right foot.     WEIGHT BEARING STATUS: You are to remain NON WEIGHT BEARING on your right foot. NON WEIGHT BEARING MEANS NO PRESSURE ON YOUR FOOT OR HEEL AT ANY TIME FOR ANY REASON!    2. OFFLOADING DEVICE: Must use a A WHEELCHAIR at all times! (do not use affected foot to push wheelchair)      4. ELEVATE: Elevating your leg means laying with your head on a pillow and your foot ABOVE YOUR WAIST.     5. DO NOT MOVE YOUR FOOT.  There is a risk of worsening the wound or incision. To give yourself a higher chance of healing, please DO NOT swing foot back and forth and wiggle foot/toes especially when inside a stabilization device.        Dressing Change lnstructions                 - Dressing Application of  Endoform    1. Endoform should be cut to the size of the wound.  It should touch the edges of the ulcer. It is okay if it overlap the edges a small amount.  Then, moisten the Endoform with saline.(If it is easier for you, you can moisten it before laying it in the wound)    2. Cover the wound with Endoform, followed by dry gauze, and secure with roll gauze if needed.     3. Endoform is naturally used by the body over time so you may not find it in the wound when you change your dressing.  If you do find some, gently cleanse the wound with saline. Do not remove the remaining Endoform, which may appear as an off-white to diana gel, just add Endoform on top.  Usually, more Endoform will need to be added every 5-7 days.     4. Change your top dressing every 1-2 days or as needed depending on drainage.    -Endoform is a collagen dressing created from ovine (sheep) fore-stomach tissue. The collagen extracellular matrix transforms into a soft conforming sheet, which is naturally incorporated into the wound over time.  Collagen dressings are used to stimulate wound healing.   ,       It IS NOT ok to get your wound  wet in the bath or shower    SEEK MEDICAL CARE IF:  You have an increase in swelling, pain, or redness around the wound.  You have an increase in the amount of pus coming from the wound.  There is a bad smell coming from the wound.  The wound appears to be worsening/enlarging  You have a fever greater than 101.5 F      It is ok to continue current wound care treatment/products for the next 2-3 days until new wound care supplies are ordered and arrive. If longer than this please contact our office at [phone].        We want to hear from you!   In the next few weeks, you should receive a call or email to complete a survey about your visit at [facility]. Please help us improve your appointment experience by letting us know how we did today. We strive to make your experience good and value any ways in which we could do better.      We value your input and suggestions.    Thank you for choosing the [facility]!

## 2022-09-16 ENCOUNTER — TELEPHONE (OUTPATIENT)
Dept: INFECTIOUS DISEASES | Facility: CLINIC | Age: 77
End: 2022-09-16

## 2022-09-16 NOTE — TELEPHONE ENCOUNTER
Authorizing: Nakul Salazar DPM in [facility]     Diagnosis: Cellulitis of right lower extremity      Please advise on scheduling

## 2022-09-20 ENCOUNTER — TELEPHONE (OUTPATIENT)
Dept: VASCULAR SURGERY | Facility: CLINIC | Age: 77
End: 2022-09-20

## 2022-09-20 DIAGNOSIS — E11.621 DIABETIC ULCER OF TOE OF RIGHT FOOT ASSOCIATED WITH TYPE 2 DIABETES MELLITUS, UNSPECIFIED ULCER STAGE (H): Primary | ICD-10-CM

## 2022-09-20 DIAGNOSIS — L97.519 DIABETIC ULCER OF TOE OF RIGHT FOOT ASSOCIATED WITH TYPE 2 DIABETES MELLITUS, UNSPECIFIED ULCER STAGE (H): Primary | ICD-10-CM

## 2022-09-20 NOTE — TELEPHONE ENCOUNTER
Caller: Lance    Provider: MD Nakul Salazar    Detailed reason for call: He is having trouble reaching to bottom of his foot for dressing changes.     He is requesting Dr. Salazar to place an order/referral for a Medicare-covered Home Care for weekly wound care. Order can be faxed to nurse, Isa Thorne at [facility] fax [phone] and she will set it up with [facility].    Best phone number to contact: [phone]    Best time to contact: any    Ok to leave a detailed message: Yes

## 2022-09-20 NOTE — TELEPHONE ENCOUNTER
LMTCB to discuss.  Is patient homebound?  Also, need Isa from [facility] phone number.  Does she know if [facility] can definitely accept patient?

## 2022-09-22 NOTE — TELEPHONE ENCOUNTER
Home care order placed and faxed to [facility] as requested.  Message left for patient with update.

## 2022-09-22 NOTE — TELEPHONE ENCOUNTER
Patient didn't receive our message from two days ago(number was in message was not correct), per pt he is homebound due to not being able to put weight on foot for 8 weeks.  [facility] number is [phone].  Lance can be reached at [phone]

## 2022-09-28 ENCOUNTER — TELEPHONE (OUTPATIENT)
Dept: VASCULAR SURGERY | Facility: CLINIC | Age: 77
End: 2022-09-28

## 2022-09-28 DIAGNOSIS — E11.621 DIABETIC ULCER OF TOE OF RIGHT FOOT ASSOCIATED WITH TYPE 2 DIABETES MELLITUS, UNSPECIFIED ULCER STAGE (H): Primary | ICD-10-CM

## 2022-09-28 DIAGNOSIS — M10.9 GOUTY ARTHROPATHY: ICD-10-CM

## 2022-09-28 DIAGNOSIS — L97.519 DIABETIC ULCER OF TOE OF RIGHT FOOT ASSOCIATED WITH TYPE 2 DIABETES MELLITUS, UNSPECIFIED ULCER STAGE (H): Primary | ICD-10-CM

## 2022-09-28 RX ORDER — ACETIC ACID 0.25 G/100ML
IRRIGANT IRRIGATION DAILY
Qty: 500 ML | Refills: 3 | Status: SHIPPED | OUTPATIENT
Start: 2022-09-28 | End: 2023-06-16

## 2022-09-28 NOTE — TELEPHONE ENCOUNTER
Martine, RN from home care, calling to give information on this patient's wound after she saw him today.  She states that it is green/yellow skin surrounding the wound with green/yellow draniage but no odor, no swelling, no redness and no heat..      She states there is a new wound on the 3rd digit of the right foot and she recommended bacitracin and bandaid change daily.     She states that the patient is non compliant with nonweightbearing, states he is doing heel walking.    She is sending pictures to [email or web address]    Martine can be reached at [phone] to discuss

## 2022-09-28 NOTE — TELEPHONE ENCOUNTER
Left Martine a message that Dr. Salazar wants to add acetic acid soaks to wound prior to dressing change. Sent prescription to his pharmacy, [facility] in [place]. Also left message for patient to call back to confirm pharmacy.

## 2022-09-28 NOTE — TELEPHONE ENCOUNTER
Caller: Maria Fernanda with [facility]    Provider: MD Nakul Salazar    Detailed reason for call: Please call with verbal order for skilled nursing:  Once per week for 9 weeks with 3 PRN for wound care.    Best phone number to contact: [phone]    Best time to contact: any    Ok to leave a detailed message: Yes

## 2022-09-29 ENCOUNTER — OFFICE VISIT (OUTPATIENT)
Dept: CARDIOLOGY | Facility: CLINIC | Age: 77
End: 2022-09-29
Attending: INTERNAL MEDICINE
Payer: COMMERCIAL

## 2022-09-29 ENCOUNTER — LAB (OUTPATIENT)
Dept: LAB | Facility: CLINIC | Age: 77
End: 2022-09-29

## 2022-09-29 ENCOUNTER — OFFICE VISIT (OUTPATIENT)
Dept: INFECTIOUS DISEASES | Facility: CLINIC | Age: 77
End: 2022-09-29
Payer: COMMERCIAL

## 2022-09-29 VITALS
HEART RATE: 96 BPM | OXYGEN SATURATION: 98 % | DIASTOLIC BLOOD PRESSURE: 60 MMHG | WEIGHT: 224 LBS | TEMPERATURE: 98 F | SYSTOLIC BLOOD PRESSURE: 118 MMHG | BODY MASS INDEX: 30.38 KG/M2

## 2022-09-29 VITALS
SYSTOLIC BLOOD PRESSURE: 100 MMHG | HEIGHT: 72 IN | RESPIRATION RATE: 16 BRPM | BODY MASS INDEX: 31.31 KG/M2 | HEART RATE: 120 BPM | WEIGHT: 231.2 LBS | DIASTOLIC BLOOD PRESSURE: 50 MMHG

## 2022-09-29 DIAGNOSIS — L03.115 CELLULITIS OF RIGHT LOWER EXTREMITY: ICD-10-CM

## 2022-09-29 DIAGNOSIS — I10 ESSENTIAL HYPERTENSION: ICD-10-CM

## 2022-09-29 DIAGNOSIS — I50.32 CHRONIC DIASTOLIC HEART FAILURE (H): ICD-10-CM

## 2022-09-29 DIAGNOSIS — R00.0 TACHYCARDIA: ICD-10-CM

## 2022-09-29 DIAGNOSIS — R06.09 DYSPNEA ON EXERTION: ICD-10-CM

## 2022-09-29 DIAGNOSIS — R06.02 SHORTNESS OF BREATH: ICD-10-CM

## 2022-09-29 DIAGNOSIS — I42.9 CARDIOMYOPATHY, UNSPECIFIED TYPE (H): ICD-10-CM

## 2022-09-29 DIAGNOSIS — E78.5 DYSLIPIDEMIA: Primary | ICD-10-CM

## 2022-09-29 DIAGNOSIS — I25.10 CORONARY ARTERY DISEASE INVOLVING NATIVE CORONARY ARTERY OF NATIVE HEART WITHOUT ANGINA PECTORIS: ICD-10-CM

## 2022-09-29 DIAGNOSIS — R07.9 CHEST PAIN, UNSPECIFIED TYPE: ICD-10-CM

## 2022-09-29 LAB
BASOPHILS # BLD AUTO: 0 10E3/UL (ref 0–0.2)
BASOPHILS NFR BLD AUTO: 0 %
EOSINOPHIL # BLD AUTO: 0.3 10E3/UL (ref 0–0.7)
EOSINOPHIL NFR BLD AUTO: 3 %
ERYTHROCYTE [DISTWIDTH] IN BLOOD BY AUTOMATED COUNT: 16.3 % (ref 10–15)
HCT VFR BLD AUTO: 37.4 % (ref 40–53)
HGB BLD-MCNC: 11.7 G/DL (ref 13.3–17.7)
IMM GRANULOCYTES # BLD: 0 10E3/UL
IMM GRANULOCYTES NFR BLD: 0 %
LYMPHOCYTES # BLD AUTO: 1.8 10E3/UL (ref 0.8–5.3)
LYMPHOCYTES NFR BLD AUTO: 23 %
MCH RBC QN AUTO: 28 PG (ref 26.5–33)
MCHC RBC AUTO-ENTMCNC: 31.3 G/DL (ref 31.5–36.5)
MCV RBC AUTO: 90 FL (ref 78–100)
MONOCYTES # BLD AUTO: 0.8 10E3/UL (ref 0–1.3)
MONOCYTES NFR BLD AUTO: 10 %
NEUTROPHILS # BLD AUTO: 4.8 10E3/UL (ref 1.6–8.3)
NEUTROPHILS NFR BLD AUTO: 62 %
PLATELET # BLD AUTO: 296 10E3/UL (ref 150–450)
RBC # BLD AUTO: 4.18 10E6/UL (ref 4.4–5.9)
WBC # BLD AUTO: 7.7 10E3/UL (ref 4–11)

## 2022-09-29 PROCEDURE — 99203 OFFICE O/P NEW LOW 30 MIN: CPT | Performed by: INTERNAL MEDICINE

## 2022-09-29 PROCEDURE — 99204 OFFICE O/P NEW MOD 45 MIN: CPT | Performed by: INTERNAL MEDICINE

## 2022-09-29 PROCEDURE — 93000 ELECTROCARDIOGRAM COMPLETE: CPT | Performed by: GENERAL ACUTE CARE HOSPITAL

## 2022-09-29 PROCEDURE — 85025 COMPLETE CBC W/AUTO DIFF WBC: CPT

## 2022-09-29 PROCEDURE — 36415 COLL VENOUS BLD VENIPUNCTURE: CPT

## 2022-09-29 RX ORDER — COLCHICINE 0.6 MG/1
TABLET ORAL
Qty: 30 TABLET | Refills: 1 | OUTPATIENT
Start: 2022-09-29

## 2022-09-29 NOTE — PROGRESS NOTES
[facility] INFECTIOUS DISEASE CLINIC [facility]    Date: 09/29/2022   Patient Name: Lance Ibrahim   YOB: 1945  MRN: [id number]      ASSESSMENT:  77-year-old man with a history of coronary artery disease, hypertension, hyperlipidemia, diabetes, previous CVA who was referred to ID clinic for right leg cellulitis    1. Right leg cellulitis.  Admitted to [facility] last month with acute onset of right leg swelling, erythema, and leukocytosis.  On cefdinir and clindamycin since discharge, but continues to have erythema and swelling.  2. Right hallux wound.  Open wound for some time.  Seen previously by Dr. Azevedo in podiatry.  Now transferred care to Dr. Salazar.  Overall, improving.  3. Multiple antibiotic allergies.  Anaphylaxis with penicillin.  Severe rash with cefazolin.  Has tolerated cefdinir and cefepime.  4. Diabetes.  Well-controlled.  Last A1c 7.2    PLAN:  -No signs of active cellulitis today.  I think the redness is mostly venous stasis, probably exacerbated by his recent cellulitis  -Recommend compression stockings and leg elevation  -CBC with differential  -Clotrimazole cream to feet bilaterally    Return to clinic as needed.    Koko Wolfe MD  [facility]   Clinic phone: [phone]   Clinic fax: [phone]     ______________________________________________________________________    HISTORY OF PRESENT ILLNESS:   Lance Ibrahim is a 77 year old man who is referred for evaluation of right leg cellulitis.  He developed acute onset redness and swelling in his right leg in August.  He immediately went to the hospital and was admitted to [facility] from 8/25-8/29.  He had improvement of his cellulitis and was discharged on cefdinir and clindamycin.  He has been on this for the past month.  He just finished his prescription.  He has noted ongoing redness and swelling in that leg, but the swelling has improved.  He also has a wound on his right hallux and is followed  up with Dr. Salazar for this.  In the hospital he had an ultrasound that showed no DVT.  He also had ABIs that showed normal flow.  He has a history of right knee replacement.      Interval History:  Not applicable      Review of Systems:  Ten systems reviewed and negative except for what is noted in the HPI       Past Medical History:  Past Medical History:   Diagnosis Date     Arthritis     osteoarthritis knees     BPH      CAD (coronary artery disease)     subtotal occlusion in the small distal LAD      Cardiomyopathy      Cerebral artery occlusion with cerebral infarction     TIA 1993, no residual     Chronic kidney disease      Chronic rhinitis      HTN (hypertension)      Hyperlipidemia      Kidney stone      PVD (peripheral vascular disease)      Sleep apnea     doesn't use cpap     TIA (transient ischaemic attack) 1993     Type 2 diabetes mellitus - 11/08/2018     Ureteral stone        Past Surgical History:  Past Surgical History:   Procedure Laterality Date     AMPUTATE TOE(S) Left 10/15/2018    Procedure: AMPUTATE TOE(S);  Left third toe amputation;  Surgeon: Ayaka Azevedo DPM, Podiatry/Foot and Ankle Surgery;  Location:  OR     ARTHROPLASTY KNEE Right 12/07/2018    Procedure: Right total knee arthroplasty;  Surgeon: Issa Cunha MD;  Location:  OR     COLONOSCOPY  03/09/2013    Procedure: COLONOSCOPY;  COLONOSCOPY;  Surgeon: Chau Hogan MD;  Location:  GI     CV HEART CATHETERIZATION WITH POSSIBLE INTERVENTION Left 03/05/2019    Procedure: Coronary Angiogram;  Surgeon: Nas Linda MD;  Location:  HEART CARDIAC CATH LAB     Diastasis recti repair  1985     EXTRACAPSULAR CATARACT EXTRATION WITH INTRAOCULAR LENS IMPLANT Left 03/13/2017     EXTRACAPSULAR CATARACT EXTRATION WITH INTRAOCULAR LENS IMPLANT Right      FOOT SURGERY  04/2013    cyst removal      HERNIA REPAIR  07/13/2004    ventral      ORIF Shoulder Left      Ventral hernia repair NOS  1987       Allergies:  Allergies  "  Allergen Reactions     Penicillins Anaphylaxis     8/25/22 - tolerated cefepime      Sulfa Drugs      \"itchy rash, swelling of face and hands\"     Ancef [Cefazolin] Rash     Sulfatolamide Rash       Medications:    Current Outpatient Medications:      acetic acid 0.25 % irrigation, Irrigate with as directed daily Apply to gauze and let soak on wound for 5 minutes prior to dressing changes., Disp: 500 mL, Rfl: 3     amLODIPine (NORVASC) 5 MG tablet, Take by mouth every 24 hours, Disp: , Rfl:      aspirin (ASA) 81 MG EC tablet, Take by mouth every 24 hours, Disp: , Rfl:      atorvastatin (LIPITOR) 40 MG tablet, Take 40 mg by mouth every 24 hours, Disp: , Rfl:      blood glucose (NO BRAND SPECIFIED) lancets standard, Use to test blood sugar 2 times daily or as directed., Disp: 100 lancet, Rfl: 4     blood glucose (NO BRAND SPECIFIED) test strip, Use to test blood sugar 2 times daily or as directed., Disp: 100 strip, Rfl: 4     blood glucose monitoring (NO BRAND SPECIFIED) meter device kit, Use to test blood sugar 2 times daily or as directed., Disp: 1 kit, Rfl: 0     celecoxib (CELEBREX) 200 MG capsule, TAKE 1 CAPSULE BY MOUTH TWICE A DAY, Disp: , Rfl:      colchicine (COLCYRS) 0.6 MG tablet, Take 1 tablet (0.6 mg) by mouth daily, Disp: 90 tablet, Rfl: 1     Ferrous Gluconate 240 (27 Fe) MG TABS, Take 1 tablet by mouth daily , Disp: , Rfl:      finasteride (PROSCAR) 5 MG tablet, Take by mouth every 24 hours, Disp: , Rfl:      fluticasone (FLONASE) 50 MCG/ACT nasal spray, Spray 1-2 sprays in nostril every 24 hours, Disp: , Rfl:      isosorbide mononitrate (IMDUR) 30 MG 24 hr tablet, Take 1 tablet (30 mg) by mouth daily, Disp: 90 tablet, Rfl: 3     labetalol (NORMODYNE) 200 MG tablet, Take 200 mg by mouth 2 times daily, Disp: , Rfl:      Lidocaine (LIDOCARE) 4 % Patch, Place 1 patch onto the skin every 24 hours To prevent lidocaine toxicity, patient should be patch free for 12 hrs daily. (Patient taking differently: " Place 1 patch onto the skin every 24 hours To prevent lidocaine toxicity, patient should be patch free for 12 hrs daily.  To back every am), Disp: , Rfl:      lisinopril (ZESTRIL) 10 MG tablet, Take 10 mg by mouth every 24 hours, Disp: , Rfl:      loratadine (CLARITIN) 10 MG tablet, Take by mouth every 24 hours, Disp: , Rfl:      metFORMIN (GLUCOPHAGE) 1000 MG tablet, Take 1,000 mg by mouth 2 times daily (with meals), Disp: , Rfl:      multivitamin w/minerals (THERA-VIT-M) tablet, , Disp: , Rfl:      Semaglutide (RYBELSUS) 7 MG TABS, Take 7 mg by mouth daily, Disp: 90 tablet, Rfl: 3     tamsulosin (FLOMAX) 0.4 MG 24 hr capsule, Take 1 capsule by mouth daily., Disp: , Rfl:      tiZANidine (ZANAFLEX) 2 MG tablet, Take 4 mg by mouth At Bedtime, Disp: , Rfl:      torsemide (DEMADEX) 20 MG tablet, Take 1 tablet (20 mg) by mouth daily, Disp: 90 tablet, Rfl: 2    Social History:  Social History     Socioeconomic History     Marital status:      Spouse name: Not on file     Number of children: Not on file     Years of education: Not on file     Highest education level: Not on file   Occupational History     Employer: SELF   Tobacco Use     Smoking status: Former Smoker     Packs/day: 0.00     Quit date: 3/17/1993     Years since quittin.5     Smokeless tobacco: Never Used   Substance and Sexual Activity     Alcohol use: Not Currently     Drug use: No     Sexual activity: Never   Other Topics Concern     Parent/sibling w/ CABG, MI or angioplasty before 65F 55M? Not Asked   Social History Narrative     Not on file     Social Determinants of Health     Financial Resource Strain: Not on file   Food Insecurity: Not on file   Transportation Needs: Not on file   Physical Activity: Not on file   Stress: Not on file   Social Connections: Not on file   Intimate Partner Violence: Not on file   Housing Stability: Not on file        Family History:  Family History   Problem Relation Age of Onset     Family History Negative  Mother          86 yo     Cancer Father          74 yo brain     Diabetes Maternal Grandfather          89 yo     Alcohol/Drug Paternal Grandfather              Colon Cancer No family hx of            PHYSICAL EXAM:    /60 (BP Location: Right arm, Patient Position: Sitting, Cuff Size: Adult Regular)   Pulse 96   Temp 98  F (36.7  C) (Oral)   Wt 101.6 kg (224 lb)   SpO2 98%   BMI 30.38 kg/m      GENERAL:  well-developed, well-nourished, in no acute distress.   HENT:  Head is normocephalic, atraumatic.   EYES:  Eyes have anicteric sclerae without conjunctival injection   LUNGS:  Normal resp pattern  CARDIOVASCULAR:  Regular rate  MUSCULOSKELETAL: Extremities warm.  Right leg erythema and edema.  Erythema disappears with leg elevation.  Small ulcer at the right hallux, superficial, no drainage  SKIN: Tinea pedis and onychomycosis on the right.  Healing wounds at the first and second toe on the left  NEUROLOGIC: Grossly nonfocal. Normal gait and station  PSYCH: Normal affect        Pertinent labs:    Lab Results   Component Value Date     2022    POTASSIUM 4.6 2022    CHLORIDE 108 (H) 2022    CO2 22 2022    BUN 16 2022    CR 1.24 2022     (H) 2022       Lab Results   Component Value Date    WBC 8.9 09/15/2022    HGB 10.8 (L) 09/15/2022    HCT 34.4 (L) 09/15/2022     09/15/2022    MCV 90 09/15/2022    RDW 16.1 (H) 09/15/2022        Lab Results   Component Value Date    BILITOTAL 0.3 2022    AST 13 2022    ALT 16 2022    PROTTOTAL 7.5 2022    ALBUMIN 3.6 2022    ALKPHOS 136 (H) 2022    LIPASE 367 10/22/2016    INR 1.17 (H) 2021            MICROBIOLOGY DATA:  Reviewed    RADIOLOGY:  Reviewed    Attestation:  Total time preparing to see this patient, face-to-face time, and coordinating care time on the same calendar date: 30 minutes.  Face-face time: 23 minutes.  Over 50% of  face-to-face time was spent in counseling/coordination of care.

## 2022-09-29 NOTE — LETTER
9/29/2022    Rickey Adamson MD  [address]  [facility] [address]    RE: Devaughn Manuelsantos       Dear Colleague,     I had the pleasure of seeing Curtis Ibrahim in the [facility].         [facility]   [address], [place] [address]   [email or web address]   OFFICE: [phone]            Impression and Plan     1.  Coronary artery disease.  As noted below, Curtis has a history of coronary disease.  Specifically, he has  primarily mild nonobstructive coronary disease he did have a small distal LAD with apparent subtotal occlusion very distally for which medical treatment has historically been advocated (see Cardiac Diagnostic section below for angiogram results).    This is stable.  He denies any anginal type chest pain.    2.  Cardiomyopathy.  Curtis has a history of mildly reduced left ventricular systolic function though most recent echocardiogram 16 March 2020 revealed ejection fraction of 50-55%.  He also has a history of hypervolemia/fluid retention related to impaired diastolic filling.  This also has been stable.  He does have some bilateral lower extremity edema though he states that this has been stable for him and actually improved as of late.    3.  Hypertension.  Blood pressure is quite reasonable in the office today at 100/50 mmHg.    4.  Dyslipidemia.  Lipid profile 16 June 2022 was favorable with LDL 67 mg/dL and HDL 63 mg/dL.    Plan on follow-up in approximately 6 months.  Curtis is quite comfortable with this plan given his overall stability.    35 minutes spent reviewing prior records (including documentation, laboratory studies, cardiac testing/imaging), interview with patient along with physical exam, planning, and subsequent documentation/crafting of note.       History of Present Illness    Once again I would like to thank you again for asking me to participate in the care of your patient, Curtis Ibrahim.  As you know, but to  reiterate for my own records, Lance Ibrahim is a 77 year old male who presents primarily to transfer care after having moved from the [facility].    Lance historically had been followed with cardiologist, Dr.Karl RODNEY Martines.  Lance has a history of cardiomyopathy with mildly reduced left ventricular function though echocardiogram 16 March 2020 revealed improved LV function with ejection fraction of 50 to 55%.  He has a history of fluid retention felt related to impaired diastolic filling.  He is also documented as having primarily mild nonobstructive coronary disease he did have a small distal LAD with apparent subtotal occlusion very distally for which medical treatment has historically been advocated (see Cardiac Diagnostic section below for angiogram results).    On interview, Lance states that he has been doing well from a cardiac standpoint.  He denies chest pain.  His breathing is been comfortable.  He has chronic lower extremity edema though this is actually been stable and even improved as of late.  He denies palpitations or lightheadedness.    Further review of systems is otherwise negative/noncontributory (medical record and 13 point review of systems reviewed as well and pertinent positives noted).       Cardiac Diagnostics     Echocardiogram 16 March 2020:  Normal left ventricular size and systolic performance with ejection fraction of 50 to 55%.  1. Trace aortic insufficiency.  2. Normal right ventricular size and systolic performance.  3. Normal atrial dimensions.    Regadenoson nuclear perfusion imaging study 18 February 2019:  1. Myocardial perfusion imaging using single isotope technique demonstrated mostly fixed mild inferior defect. Probably related to attenuation artifacts, however cannot exclude mild ischemia in this area. There is a second fixed basal anterior defect consistent with attenuation artifact. (technically very poor study)  2. Gated images demonstrated mildly dilated LV cavity  "size and mildly reduced LV systolic function.  The left ventricular systolic function is 49%.  3. Compared to the prior study from no prior study.    Coronary angiography 5 March 2019:  1. Left dominant coronary system.   2. Left main artery: The left main artery has disease in its proximal portion.  It is best visualized in the straight caudal view. It appears to be no more than 30-40%, but I am suspicious that the entire left main may actually be somewhat diffusely diseased. The reason is if one applies Bo's law, this left main artery even distally where it is \"normal\" is still smaller than the proximal LAD and the left circumflex which would violate Bo's law suggesting to me that the left main is probably diffusely diseased.  3. Left anterior descending:  The LAD has a proximal narrowing before the first septal . It is a smooth narrowing of approximately 30-35%. The remainder of the LAD is relatively normal. The remainder of the LAD is relatively normal until one gets to the apex. At this point the LAD is a very small vessel measuring less than 1.5 to 1.7 mm in diameter. The vessel is subtotally occluded. This is a wraparound LAD however, so no doubt the apex and the distal inferior wall would appear ischemic on the stress test. As I mentioned, the stress test actually showed fixed infarct, not necessarily reversible ischemia.  4. Left circumflex: The left circumflex as mentioned is a dominant system. There is mild but diffuse disease throughout the proximal left circumflex with narrowing up to 30-35% narrowing. There is then only trivial scattered plaque from the mid left circumflex into the left PDA.  5. Ramus intermedius: There is a ramus intermedius which could also be called a high OM1. It has only mild disease under 35%.  6. Right coronary artery:  The right coronary artery is a nondominant vessel. It has mild disease in its midportion. It may have up to 30-40% narrowing, but again the " vessel is well under 1.75 mm in diameter at this point.    Ankle-brachial indices 26 August 2022:  1. Right lower extremity: KAREN at rest is normal.  2. Left lower extremity: KAREN at rest is normal.         Physical Examination       /50 (BP Location: Right arm, Patient Position: Sitting, Cuff Size: Adult Large)   Pulse 120   Resp 16   Ht 1.829 m (6')   Wt 104.9 kg (231 lb 3.2 oz)   BMI 31.36 kg/m          Wt Readings from Last 3 Encounters:   09/29/22 104.9 kg (231 lb 3.2 oz)   09/29/22 101.6 kg (224 lb)   09/15/22 103.4 kg (228 lb)     The patient is alert and oriented times three. Sclerae are anicteric. Mucosal membranes are moist. Jugular venous pressure is normal. No significant adenopathy/thyromegally appreciated. Lungs are clear with good expansion. On cardiovascular exam, the patient has a regular S1 and S2. Abdomen is soft and non-tender. Extremities reveal no clubbing, cyanosis, with bilateral lower extremity edema although right greater than left.  Right lower extremity edema has been lessening as he continues to convalesce from cellulitis involving the leg.       Family History/Social History/Risk Factors   Patient does not smoke.  Family history reviewed, and family history includes Alcohol/Drug in his paternal grandfather; Cancer in his father; Diabetes in his maternal grandfather; Family History Negative in his mother.        Medical History  Surgical History Family History Social History   Past Medical History:   Diagnosis Date     Arthritis     osteoarthritis knees     BPH      CAD (coronary artery disease)     subtotal occlusion in the small distal LAD      Cardiomyopathy      Cerebral artery occlusion with cerebral infarction     TIA 1993, no residual     Chronic kidney disease      Chronic rhinitis      HTN (hypertension)      Hyperlipidemia      Kidney stone      PVD (peripheral vascular disease)      Sleep apnea     doesn't use cpap     TIA (transient ischaemic attack) 1993     Type 2  diabetes mellitus - 2018     Ureteral stone      Past Surgical History:   Procedure Laterality Date     AMPUTATE TOE(S) Left 10/15/2018    Procedure: AMPUTATE TOE(S);  Left third toe amputation;  Surgeon: Ayaka Azevedo DPM, Podiatry/Foot and Ankle Surgery;  Location: [facility] OR     ARTHROPLASTY KNEE Right 2018    Procedure: Right total knee arthroplasty;  Surgeon: Issa Cunha MD;  Location: [facility] OR     COLONOSCOPY  2013    Procedure: COLONOSCOPY;  COLONOSCOPY;  Surgeon: Chau Hogan MD;  Location:  GI     CV HEART CATHETERIZATION WITH POSSIBLE INTERVENTION Left 2019    Procedure: Coronary Angiogram;  Surgeon: Nas Linda MD;  Location:  HEART CARDIAC CATH LAB     Diastasis recti repair  1985     EXTRACAPSULAR CATARACT EXTRATION WITH INTRAOCULAR LENS IMPLANT Left 2017     EXTRACAPSULAR CATARACT EXTRATION WITH INTRAOCULAR LENS IMPLANT Right      FOOT SURGERY  2013    cyst removal      HERNIA REPAIR  2004    ventral      ORIF Shoulder Left      Ventral hernia repair NOS  1987     Family History   Problem Relation Age of Onset     Family History Negative Mother          88 yo     Cancer Father          74 yo brain     Diabetes Maternal Grandfather          89 yo     Alcohol/Drug Paternal Grandfather              Colon Cancer No family hx of         Social History     Socioeconomic History     Marital status:      Spouse name: Not on file     Number of children: Not on file     Years of education: Not on file     Highest education level: Not on file   Occupational History     Employer: SELF   Tobacco Use     Smoking status: Former Smoker     Packs/day: 0.00     Quit date: 3/17/1993     Years since quittin.5     Smokeless tobacco: Never Used   Vaping Use     Vaping Use: Never used   Substance and Sexual Activity     Alcohol use: Not Currently     Drug use: No     Sexual activity: Never   Other Topics Concern      Parent/sibling w/ CABG, MI or angioplasty before 65F 55M? Not Asked   Social History Narrative     Not on file     Social Determinants of Health     Financial Resource Strain: Not on file   Food Insecurity: Not on file   Transportation Needs: Not on file   Physical Activity: Not on file   Stress: Not on file   Social Connections: Not on file   Intimate Partner Violence: Not on file   Housing Stability: Not on file           Medications  Allergies   Current Outpatient Medications   Medication Sig Dispense Refill     acetic acid 0.25 % irrigation Irrigate with as directed daily Apply to gauze and let soak on wound for 5 minutes prior to dressing changes. 500 mL 3     amLODIPine (NORVASC) 5 MG tablet Take by mouth every 24 hours       aspirin (ASA) 81 MG EC tablet Take by mouth every 24 hours       atorvastatin (LIPITOR) 40 MG tablet Take 40 mg by mouth every 24 hours       blood glucose (NO BRAND SPECIFIED) lancets standard Use to test blood sugar 2 times daily or as directed. 100 lancet 4     blood glucose (NO BRAND SPECIFIED) test strip Use to test blood sugar 2 times daily or as directed. 100 strip 4     blood glucose monitoring (NO BRAND SPECIFIED) meter device kit Use to test blood sugar 2 times daily or as directed. 1 kit 0     colchicine (COLCYRS) 0.6 MG tablet Take 1 tablet (0.6 mg) by mouth daily 90 tablet 1     Ferrous Gluconate 240 (27 Fe) MG TABS Take 1 tablet by mouth daily        finasteride (PROSCAR) 5 MG tablet Take by mouth every 24 hours       fluticasone (FLONASE) 50 MCG/ACT nasal spray Spray 1-2 sprays in nostril every 24 hours       isosorbide mononitrate (IMDUR) 30 MG 24 hr tablet Take 1 tablet (30 mg) by mouth daily 90 tablet 3     labetalol (NORMODYNE) 200 MG tablet Take 200 mg by mouth 2 times daily       Lidocaine (LIDOCARE) 4 % Patch Place 1 patch onto the skin every 24 hours To prevent lidocaine toxicity, patient should be patch free for 12 hrs daily. (Patient taking differently: Place 1  "patch onto the skin every 24 hours To prevent lidocaine toxicity, patient should be patch free for 12 hrs daily.  To back every am)       lisinopril (ZESTRIL) 10 MG tablet Take 10 mg by mouth every 24 hours       loratadine (CLARITIN) 10 MG tablet Take by mouth every 24 hours       metFORMIN (GLUCOPHAGE) 1000 MG tablet Take 1,000 mg by mouth 2 times daily (with meals)       multivitamin w/minerals (THERA-VIT-M) tablet Take 1 tablet by mouth daily       Semaglutide (RYBELSUS) 7 MG TABS Take 7 mg by mouth daily 90 tablet 3     tamsulosin (FLOMAX) 0.4 MG 24 hr capsule Take 1 capsule by mouth daily.       tiZANidine (ZANAFLEX) 2 MG tablet Take 4 mg by mouth At Bedtime       torsemide (DEMADEX) 20 MG tablet Take 1 tablet (20 mg) by mouth daily 90 tablet 2       Allergies   Allergen Reactions     Penicillins Anaphylaxis     8/25/22 - tolerated cefepime      Sulfa Drugs      \"itchy rash, swelling of face and hands\"     Ancef [Cefazolin] Rash     Sulfatolamide Rash          Lab Results    Chemistry/lipid CBC Cardiac Enzymes/BNP/TSH/INR   Recent Labs   Lab Test 06/16/22  0934 03/05/16  0945 02/14/15  0840   CHOL 144   < > 138   HDL 63   < > 55   LDL 67   < > 72   TRIG 69   < > 57   CHOLHDLRATIO  --   --  2.5    < > = values in this interval not displayed.     Recent Labs   Lab Test 06/16/22  0934 09/29/21  0913 04/29/21  1631   LDL 67 48 48     Recent Labs   Lab Test 08/29/22  0802 08/29/22  0640 08/27/22  1138 08/27/22  0710   NA  --   --   --  138   POTASSIUM  --   --   --  4.6   CHLORIDE  --   --   --  108*   CO2  --   --   --  22   GLC  --  131*   < > 131*   BUN  --   --   --  16   CR 1.24  --    < > 1.23   GFRESTIMATED 60*  --    < > 60*   SHANNAN  --   --   --  9.4    < > = values in this interval not displayed.     Recent Labs   Lab Test 08/29/22  0802 08/28/22  0741 08/27/22  0710   CR 1.24 1.25 1.23     Recent Labs   Lab Test 09/09/22  1145 06/16/22  0934 11/01/21  0738   A1C 7.2* 8.6* 7.5*          Recent Labs   Lab " Test 09/29/22  0919   WBC 7.7   HGB 11.7*   HCT 37.4*   MCV 90        Recent Labs   Lab Test 09/29/22  0919 09/15/22  1028 08/26/22  0942   HGB 11.7* 10.8* 9.9*    No results for input(s): TROPONINI in the last 78223 hours.  Recent Labs   Lab Test 02/28/20  1318 02/17/19  0723   NTBNPI  --  633   NTBNP 178  --      Recent Labs   Lab Test 01/24/20  1359   TSH 3.24     Recent Labs   Lab Test 12/25/21 2000 03/04/19  0947 02/26/19  1539   INR 1.17* 1.13 1.04          Medications  Allergies   Current Outpatient Medications   Medication Sig Dispense Refill     acetic acid 0.25 % irrigation Irrigate with as directed daily Apply to gauze and let soak on wound for 5 minutes prior to dressing changes. 500 mL 3     amLODIPine (NORVASC) 5 MG tablet Take by mouth every 24 hours       aspirin (ASA) 81 MG EC tablet Take by mouth every 24 hours       atorvastatin (LIPITOR) 40 MG tablet Take 40 mg by mouth every 24 hours       blood glucose (NO BRAND SPECIFIED) lancets standard Use to test blood sugar 2 times daily or as directed. 100 lancet 4     blood glucose (NO BRAND SPECIFIED) test strip Use to test blood sugar 2 times daily or as directed. 100 strip 4     blood glucose monitoring (NO BRAND SPECIFIED) meter device kit Use to test blood sugar 2 times daily or as directed. 1 kit 0     colchicine (COLCYRS) 0.6 MG tablet Take 1 tablet (0.6 mg) by mouth daily 90 tablet 1     Ferrous Gluconate 240 (27 Fe) MG TABS Take 1 tablet by mouth daily        finasteride (PROSCAR) 5 MG tablet Take by mouth every 24 hours       fluticasone (FLONASE) 50 MCG/ACT nasal spray Spray 1-2 sprays in nostril every 24 hours       isosorbide mononitrate (IMDUR) 30 MG 24 hr tablet Take 1 tablet (30 mg) by mouth daily 90 tablet 3     labetalol (NORMODYNE) 200 MG tablet Take 200 mg by mouth 2 times daily       Lidocaine (LIDOCARE) 4 % Patch Place 1 patch onto the skin every 24 hours To prevent lidocaine toxicity, patient should be patch free for 12  "hrs daily. (Patient taking differently: Place 1 patch onto the skin every 24 hours To prevent lidocaine toxicity, patient should be patch free for 12 hrs daily.  To back every am)       lisinopril (ZESTRIL) 10 MG tablet Take 10 mg by mouth every 24 hours       loratadine (CLARITIN) 10 MG tablet Take by mouth every 24 hours       metFORMIN (GLUCOPHAGE) 1000 MG tablet Take 1,000 mg by mouth 2 times daily (with meals)       multivitamin w/minerals (THERA-VIT-M) tablet Take 1 tablet by mouth daily       Semaglutide (RYBELSUS) 7 MG TABS Take 7 mg by mouth daily 90 tablet 3     tamsulosin (FLOMAX) 0.4 MG 24 hr capsule Take 1 capsule by mouth daily.       tiZANidine (ZANAFLEX) 2 MG tablet Take 4 mg by mouth At Bedtime       torsemide (DEMADEX) 20 MG tablet Take 1 tablet (20 mg) by mouth daily 90 tablet 2      Allergies   Allergen Reactions     Penicillins Anaphylaxis     8/25/22 - tolerated cefepime      Sulfa Drugs      \"itchy rash, swelling of face and hands\"     Ancef [Cefazolin] Rash     Sulfatolamide Rash        Lab Results   Lab Results   Component Value Date     08/27/2022     04/29/2021    CO2 22 08/27/2022    CO2 27 04/29/2021    BUN 16 08/27/2022    BUN 27 04/29/2021    GFR 15 12/03/2021     Lab Results   Component Value Date    WBC 7.7 09/29/2022    WBC 8.5 04/29/2021    HGB 11.7 09/29/2022    HGB 12.2 04/29/2021    HCT 37.4 09/29/2022    HCT 39.6 04/29/2021    MCV 90 09/29/2022    MCV 92 04/29/2021     09/29/2022     04/29/2021     Lab Results   Component Value Date    CHOL 144 06/16/2022    CHOL 132 04/29/2021    TRIG 69 06/16/2022    TRIG 153 04/29/2021    HDL 63 06/16/2022    HDL 53 04/29/2021     Lab Results   Component Value Date    INR 1.17 12/25/2021    INR 1.13 03/04/2019     Lab Results   Component Value Date    TSH 3.24 01/24/2020         Medical History  Surgical History   Past Medical History:   Diagnosis Date     Arthritis     osteoarthritis knees     BPH      CAD " (coronary artery disease)     subtotal occlusion in the small distal LAD      Cardiomyopathy      Cerebral artery occlusion with cerebral infarction     TIA 1993, no residual     Chronic kidney disease      Chronic rhinitis      HTN (hypertension)      Hyperlipidemia      Kidney stone      PVD (peripheral vascular disease)      Sleep apnea     doesn't use cpap     TIA (transient ischaemic attack) 1993     Type 2 diabetes mellitus - 11/08/2018     Ureteral stone       Past Surgical History:   Procedure Laterality Date     AMPUTATE TOE(S) Left 10/15/2018    Procedure: AMPUTATE TOE(S);  Left third toe amputation;  Surgeon: Ayaka Azevedo DPM, Podiatry/Foot and Ankle Surgery;  Location: [facility] OR     ARTHROPLASTY KNEE Right 12/07/2018    Procedure: Right total knee arthroplasty;  Surgeon: Issa Cunha MD;  Location:  OR     COLONOSCOPY  03/09/2013    Procedure: COLONOSCOPY;  COLONOSCOPY;  Surgeon: Chau Hogan MD;  Location:  GI     CV HEART CATHETERIZATION WITH POSSIBLE INTERVENTION Left 03/05/2019    Procedure: Coronary Angiogram;  Surgeon: Nas Linda MD;  Location:  HEART CARDIAC CATH LAB     Diastasis recti repair  1985     EXTRACAPSULAR CATARACT EXTRATION WITH INTRAOCULAR LENS IMPLANT Left 03/13/2017     EXTRACAPSULAR CATARACT EXTRATION WITH INTRAOCULAR LENS IMPLANT Right      FOOT SURGERY  04/2013    cyst removal      HERNIA REPAIR  07/13/2004    ventral      ORIF Shoulder Left      Ventral hernia repair NOS  1987             Thank you for allowing me to participate in the care of your patient.      Sincerely,     Lc Amaya MD     [facility]  cc:   Lars Martines MD  [address] [address]

## 2022-09-29 NOTE — TELEPHONE ENCOUNTER
Refused as duplicate request.   Patient switched to mail order pharmacy.  Filled 9/28/2022 disp 90 refill 1   Anne Forrester RN   09/29/22 7:25 AM  [facility] Nurse Advisor

## 2022-09-29 NOTE — LETTER
9/29/2022         RE: Lance Ibrahim  [address] [place] [id number]        Dear Colleague,    Thank you for referring your patient, Lance Ibrahim, to the [facility]. Please see a copy of my visit note below.    [facility] - [place]    Date: 09/29/2022   Patient Name: Lance Ibrahim   YOB: 1945  MRN: [id number]      ASSESSMENT:  77-year-old man with a history of coronary artery disease, hypertension, hyperlipidemia, diabetes, previous CVA who was referred to ID clinic for right leg cellulitis    1. Right leg cellulitis.  Admitted to [facility] last month with acute onset of right leg swelling, erythema, and leukocytosis.  On cefdinir and clindamycin since discharge, but continues to have erythema and swelling.  2. Right hallux wound.  Open wound for some time.  Seen previously by Dr. Azevedo in podiatry.  Now transferred care to Dr. Salazar.  Overall, improving.  3. Multiple antibiotic allergies.  Anaphylaxis with penicillin.  Severe rash with cefazolin.  Has tolerated cefdinir and cefepime.  4. Diabetes.  Well-controlled.  Last A1c 7.2    PLAN:  -No signs of active cellulitis today.  I think the redness is mostly venous stasis, probably exacerbated by his recent cellulitis  -Recommend compression stockings and leg elevation  -CBC with differential  -Clotrimazole cream to feet bilaterally    Return to clinic as needed.    Koko Wolfe MD  [facility]   Clinic phone: [phone]   Clinic fax: [phone]     ______________________________________________________________________    HISTORY OF PRESENT ILLNESS:   Lance Ibrahim is a 77 year old man who is referred for evaluation of right leg cellulitis.  He developed acute onset redness and swelling in his right leg in August.  He immediately went to the hospital and was admitted to [facility] from 8/25-8/29.  He had improvement of his cellulitis and was discharged on cefdinir  and clindamycin.  He has been on this for the past month.  He just finished his prescription.  He has noted ongoing redness and swelling in that leg, but the swelling has improved.  He also has a wound on his right hallux and is followed up with Dr. Salazar for this.  In the hospital he had an ultrasound that showed no DVT.  He also had ABIs that showed normal flow.  He has a history of right knee replacement.      Interval History:  Not applicable      Review of Systems:  Ten systems reviewed and negative except for what is noted in the HPI       Past Medical History:  Past Medical History:   Diagnosis Date     Arthritis     osteoarthritis knees     BPH      CAD (coronary artery disease)     subtotal occlusion in the small distal LAD      Cardiomyopathy      Cerebral artery occlusion with cerebral infarction     TIA 1993, no residual     Chronic kidney disease      Chronic rhinitis      HTN (hypertension)      Hyperlipidemia      Kidney stone      PVD (peripheral vascular disease)      Sleep apnea     doesn't use cpap     TIA (transient ischaemic attack) 1993     Type 2 diabetes mellitus - 11/08/2018     Ureteral stone        Past Surgical History:  Past Surgical History:   Procedure Laterality Date     AMPUTATE TOE(S) Left 10/15/2018    Procedure: AMPUTATE TOE(S);  Left third toe amputation;  Surgeon: Ayaka Azevedo DPM, Podiatry/Foot and Ankle Surgery;  Location:  OR     ARTHROPLASTY KNEE Right 12/07/2018    Procedure: Right total knee arthroplasty;  Surgeon: sIsa Cunha MD;  Location:  OR     COLONOSCOPY  03/09/2013    Procedure: COLONOSCOPY;  COLONOSCOPY;  Surgeon: Chau Hogan MD;  Location:  GI     CV HEART CATHETERIZATION WITH POSSIBLE INTERVENTION Left 03/05/2019    Procedure: Coronary Angiogram;  Surgeon: Nas Linda MD;  Location:  HEART CARDIAC CATH LAB     Diastasis recti repair  1985     EXTRACAPSULAR CATARACT EXTRATION WITH INTRAOCULAR LENS IMPLANT Left 03/13/2017      "EXTRACAPSULAR CATARACT EXTRATION WITH INTRAOCULAR LENS IMPLANT Right      FOOT SURGERY  04/2013    cyst removal      HERNIA REPAIR  07/13/2004    ventral      ORIF Shoulder Left      Ventral hernia repair NOS  1987       Allergies:  Allergies   Allergen Reactions     Penicillins Anaphylaxis     8/25/22 - tolerated cefepime      Sulfa Drugs      \"itchy rash, swelling of face and hands\"     Ancef [Cefazolin] Rash     Sulfatolamide Rash       Medications:    Current Outpatient Medications:      acetic acid 0.25 % irrigation, Irrigate with as directed daily Apply to gauze and let soak on wound for 5 minutes prior to dressing changes., Disp: 500 mL, Rfl: 3     amLODIPine (NORVASC) 5 MG tablet, Take by mouth every 24 hours, Disp: , Rfl:      aspirin (ASA) 81 MG EC tablet, Take by mouth every 24 hours, Disp: , Rfl:      atorvastatin (LIPITOR) 40 MG tablet, Take 40 mg by mouth every 24 hours, Disp: , Rfl:      blood glucose (NO BRAND SPECIFIED) lancets standard, Use to test blood sugar 2 times daily or as directed., Disp: 100 lancet, Rfl: 4     blood glucose (NO BRAND SPECIFIED) test strip, Use to test blood sugar 2 times daily or as directed., Disp: 100 strip, Rfl: 4     blood glucose monitoring (NO BRAND SPECIFIED) meter device kit, Use to test blood sugar 2 times daily or as directed., Disp: 1 kit, Rfl: 0     celecoxib (CELEBREX) 200 MG capsule, TAKE 1 CAPSULE BY MOUTH TWICE A DAY, Disp: , Rfl:      colchicine (COLCYRS) 0.6 MG tablet, Take 1 tablet (0.6 mg) by mouth daily, Disp: 90 tablet, Rfl: 1     Ferrous Gluconate 240 (27 Fe) MG TABS, Take 1 tablet by mouth daily , Disp: , Rfl:      finasteride (PROSCAR) 5 MG tablet, Take by mouth every 24 hours, Disp: , Rfl:      fluticasone (FLONASE) 50 MCG/ACT nasal spray, Spray 1-2 sprays in nostril every 24 hours, Disp: , Rfl:      isosorbide mononitrate (IMDUR) 30 MG 24 hr tablet, Take 1 tablet (30 mg) by mouth daily, Disp: 90 tablet, Rfl: 3     labetalol (NORMODYNE) 200 MG " tablet, Take 200 mg by mouth 2 times daily, Disp: , Rfl:      Lidocaine (LIDOCARE) 4 % Patch, Place 1 patch onto the skin every 24 hours To prevent lidocaine toxicity, patient should be patch free for 12 hrs daily. (Patient taking differently: Place 1 patch onto the skin every 24 hours To prevent lidocaine toxicity, patient should be patch free for 12 hrs daily.  To back every am), Disp: , Rfl:      lisinopril (ZESTRIL) 10 MG tablet, Take 10 mg by mouth every 24 hours, Disp: , Rfl:      loratadine (CLARITIN) 10 MG tablet, Take by mouth every 24 hours, Disp: , Rfl:      metFORMIN (GLUCOPHAGE) 1000 MG tablet, Take 1,000 mg by mouth 2 times daily (with meals), Disp: , Rfl:      multivitamin w/minerals (THERA-VIT-M) tablet, , Disp: , Rfl:      Semaglutide (RYBELSUS) 7 MG TABS, Take 7 mg by mouth daily, Disp: 90 tablet, Rfl: 3     tamsulosin (FLOMAX) 0.4 MG 24 hr capsule, Take 1 capsule by mouth daily., Disp: , Rfl:      tiZANidine (ZANAFLEX) 2 MG tablet, Take 4 mg by mouth At Bedtime, Disp: , Rfl:      torsemide (DEMADEX) 20 MG tablet, Take 1 tablet (20 mg) by mouth daily, Disp: 90 tablet, Rfl: 2    Social History:  Social History     Socioeconomic History     Marital status:      Spouse name: Not on file     Number of children: Not on file     Years of education: Not on file     Highest education level: Not on file   Occupational History     Employer: SELF   Tobacco Use     Smoking status: Former Smoker     Packs/day: 0.00     Quit date: 3/17/1993     Years since quittin.5     Smokeless tobacco: Never Used   Substance and Sexual Activity     Alcohol use: Not Currently     Drug use: No     Sexual activity: Never   Other Topics Concern     Parent/sibling w/ CABG, MI or angioplasty before 65F 55M? Not Asked   Social History Narrative     Not on file     Social Determinants of Health     Financial Resource Strain: Not on file   Food Insecurity: Not on file   Transportation Needs: Not on file   Physical  Activity: Not on file   Stress: Not on file   Social Connections: Not on file   Intimate Partner Violence: Not on file   Housing Stability: Not on file        Family History:  Family History   Problem Relation Age of Onset     Family History Negative Mother          88 yo     Cancer Father          74 yo brain     Diabetes Maternal Grandfather          91 yo     Alcohol/Drug Paternal Grandfather              Colon Cancer No family hx of            PHYSICAL EXAM:    /60 (BP Location: Right arm, Patient Position: Sitting, Cuff Size: Adult Regular)   Pulse 96   Temp 98  F (36.7  C) (Oral)   Wt 101.6 kg (224 lb)   SpO2 98%   BMI 30.38 kg/m      GENERAL:  well-developed, well-nourished, in no acute distress.   HENT:  Head is normocephalic, atraumatic.   EYES:  Eyes have anicteric sclerae without conjunctival injection   LUNGS:  Normal resp pattern  CARDIOVASCULAR:  Regular rate  MUSCULOSKELETAL: Extremities warm.  Right leg erythema and edema.  Erythema disappears with leg elevation.  Small ulcer at the right hallux, superficial, no drainage  SKIN: Tinea pedis and onychomycosis on the right.  Healing wounds at the first and second toe on the left  NEUROLOGIC: Grossly nonfocal. Normal gait and station  PSYCH: Normal affect        Pertinent labs:    Lab Results   Component Value Date     2022    POTASSIUM 4.6 2022    CHLORIDE 108 (H) 2022    CO2 22 2022    BUN 16 2022    CR 1.24 2022     (H) 2022       Lab Results   Component Value Date    WBC 8.9 09/15/2022    HGB 10.8 (L) 09/15/2022    HCT 34.4 (L) 09/15/2022     09/15/2022    MCV 90 09/15/2022    RDW 16.1 (H) 09/15/2022        Lab Results   Component Value Date    BILITOTAL 0.3 2022    AST 13 2022    ALT 16 2022    PROTTOTAL 7.5 2022    ALBUMIN 3.6 2022    ALKPHOS 136 (H) 2022    LIPASE 367 10/22/2016    INR 1.17 (H) 2021             MICROBIOLOGY DATA:  Reviewed    RADIOLOGY:  Reviewed    Attestation:  Total time preparing to see this patient, face-to-face time, and coordinating care time on the same calendar date: 30 minutes.  Face-face time: 23 minutes.  Over 50% of face-to-face time was spent in counseling/coordination of care.        Again, thank you for allowing me to participate in the care of your patient.        Sincerely,        Koko Wolfe MD

## 2022-09-29 NOTE — PATIENT INSTRUCTIONS
I recommend using compression stockings during the day to help with swelling    Start clotrimazole cream (antifungal foot cream) twice daily to both feet. This can be found over the counter.    No antibiotics at this time.

## 2022-09-29 NOTE — PROGRESS NOTES
[facility] [address], [place] [address]   [email or web address]   OFFICE: [phone]            Impression and Plan     1.  Coronary artery disease.  As noted below, Lance has a history of coronary disease.  Specifically, he has  primarily mild nonobstructive coronary disease he did have a small distal LAD with apparent subtotal occlusion very distally for which medical treatment has historically been advocated (see Cardiac Diagnostic section below for angiogram results).    This is stable.  He denies any anginal type chest pain.    2.  Cardiomyopathy.  Lance has a history of mildly reduced left ventricular systolic function though most recent echocardiogram 16 March 2020 revealed ejection fraction of 50-55%.  He also has a history of hypervolemia/fluid retention related to impaired diastolic filling.  This also has been stable.  He does have some bilateral lower extremity edema though he states that this has been stable for him and actually improved as of late.    3.  Hypertension.  Blood pressure is quite reasonable in the office today at 100/50 mmHg.    4.  Dyslipidemia.  Lipid profile 16 June 2022 was favorable with LDL 67 mg/dL and HDL 63 mg/dL.    Plan on follow-up in approximately 6 months.  Lance is quite comfortable with this plan given his overall stability.    35 minutes spent reviewing prior records (including documentation, laboratory studies, cardiac testing/imaging), interview with patient along with physical exam, planning, and subsequent documentation/crafting of note.       History of Present Illness    Once again I would like to thank you again for asking me to participate in the care of your patient, Lance Ibrahim.  As you know, but to reiterate for my own records, Lance Ibrahim is a 77 year old male who presents primarily to transfer care after having moved from the [facility].    Lance historically had been followed with  cardiologist, Dr.Karl RODNEY Martines.  Lance has a history of cardiomyopathy with mildly reduced left ventricular function though echocardiogram 16 March 2020 revealed improved LV function with ejection fraction of 50 to 55%.  He has a history of fluid retention felt related to impaired diastolic filling.  He is also documented as having primarily mild nonobstructive coronary disease he did have a small distal LAD with apparent subtotal occlusion very distally for which medical treatment has historically been advocated (see Cardiac Diagnostic section below for angiogram results).    On interview, Lance states that he has been doing well from a cardiac standpoint.  He denies chest pain.  His breathing is been comfortable.  He has chronic lower extremity edema though this is actually been stable and even improved as of late.  He denies palpitations or lightheadedness.    Further review of systems is otherwise negative/noncontributory (medical record and 13 point review of systems reviewed as well and pertinent positives noted).       Cardiac Diagnostics     Echocardiogram 16 March 2020:  Normal left ventricular size and systolic performance with ejection fraction of 50 to 55%.  1. Trace aortic insufficiency.  2. Normal right ventricular size and systolic performance.  3. Normal atrial dimensions.    Regadenoson nuclear perfusion imaging study 18 February 2019:  1. Myocardial perfusion imaging using single isotope technique demonstrated mostly fixed mild inferior defect. Probably related to attenuation artifacts, however cannot exclude mild ischemia in this area. There is a second fixed basal anterior defect consistent with attenuation artifact. (technically very poor study)  2. Gated images demonstrated mildly dilated LV cavity size and mildly reduced LV systolic function.  The left ventricular systolic function is 49%.  3. Compared to the prior study from no prior study.    Coronary angiography 5 March 2019:  1. Left  "dominant coronary system.   2. Left main artery: The left main artery has disease in its proximal portion.  It is best visualized in the straight caudal view. It appears to be no more than 30-40%, but I am suspicious that the entire left main may actually be somewhat diffusely diseased. The reason is if one applies Bo's law, this left main artery even distally where it is \"normal\" is still smaller than the proximal LAD and the left circumflex which would violate Bo's law suggesting to me that the left main is probably diffusely diseased.  3. Left anterior descending:  The LAD has a proximal narrowing before the first septal . It is a smooth narrowing of approximately 30-35%. The remainder of the LAD is relatively normal. The remainder of the LAD is relatively normal until one gets to the apex. At this point the LAD is a very small vessel measuring less than 1.5 to 1.7 mm in diameter. The vessel is subtotally occluded. This is a wraparound LAD however, so no doubt the apex and the distal inferior wall would appear ischemic on the stress test. As I mentioned, the stress test actually showed fixed infarct, not necessarily reversible ischemia.  4. Left circumflex: The left circumflex as mentioned is a dominant system. There is mild but diffuse disease throughout the proximal left circumflex with narrowing up to 30-35% narrowing. There is then only trivial scattered plaque from the mid left circumflex into the left PDA.  5. Ramus intermedius: There is a ramus intermedius which could also be called a high OM1. It has only mild disease under 35%.  6. Right coronary artery:  The right coronary artery is a nondominant vessel. It has mild disease in its midportion. It may have up to 30-40% narrowing, but again the vessel is well under 1.75 mm in diameter at this point.    Ankle-brachial indices 26 August 2022:  1. Right lower extremity: KAREN at rest is normal.  2. Left lower extremity: KAREN at rest is " normal.         Physical Examination       /50 (BP Location: Right arm, Patient Position: Sitting, Cuff Size: Adult Large)   Pulse 120   Resp 16   Ht 1.829 m (6')   Wt 104.9 kg (231 lb 3.2 oz)   BMI 31.36 kg/m          Wt Readings from Last 3 Encounters:   09/29/22 104.9 kg (231 lb 3.2 oz)   09/29/22 101.6 kg (224 lb)   09/15/22 103.4 kg (228 lb)     The patient is alert and oriented times three. Sclerae are anicteric. Mucosal membranes are moist. Jugular venous pressure is normal. No significant adenopathy/thyromegally appreciated. Lungs are clear with good expansion. On cardiovascular exam, the patient has a regular S1 and S2. Abdomen is soft and non-tender. Extremities reveal no clubbing, cyanosis, with bilateral lower extremity edema although right greater than left.  Right lower extremity edema has been lessening as he continues to convalesce from cellulitis involving the leg.       Family History/Social History/Risk Factors   Patient does not smoke.  Family history reviewed, and family history includes Alcohol/Drug in his paternal grandfather; Cancer in his father; Diabetes in his maternal grandfather; Family History Negative in his mother.        Medical History  Surgical History Family History Social History   Past Medical History:   Diagnosis Date     Arthritis     osteoarthritis knees     BPH      CAD (coronary artery disease)     subtotal occlusion in the small distal LAD      Cardiomyopathy      Cerebral artery occlusion with cerebral infarction     TIA 1993, no residual     Chronic kidney disease      Chronic rhinitis      HTN (hypertension)      Hyperlipidemia      Kidney stone      PVD (peripheral vascular disease)      Sleep apnea     doesn't use cpap     TIA (transient ischaemic attack) 1993     Type 2 diabetes mellitus - 11/08/2018     Ureteral stone      Past Surgical History:   Procedure Laterality Date     AMPUTATE TOE(S) Left 10/15/2018    Procedure: AMPUTATE TOE(S);  Left third toe  amputation;  Surgeon: Ayaka Azevedo DPM, Podiatry/Foot and Ankle Surgery;  Location: [facility] OR     ARTHROPLASTY KNEE Right 2018    Procedure: Right total knee arthroplasty;  Surgeon: Issa Cunha MD;  Location: [facility] OR     COLONOSCOPY  2013    Procedure: COLONOSCOPY;  COLONOSCOPY;  Surgeon: Chau Hogan MD;  Location:  GI     CV HEART CATHETERIZATION WITH POSSIBLE INTERVENTION Left 2019    Procedure: Coronary Angiogram;  Surgeon: Nas Linda MD;  Location:  HEART CARDIAC CATH LAB     Diastasis recti repair  1985     EXTRACAPSULAR CATARACT EXTRATION WITH INTRAOCULAR LENS IMPLANT Left 2017     EXTRACAPSULAR CATARACT EXTRATION WITH INTRAOCULAR LENS IMPLANT Right      FOOT SURGERY  2013    cyst removal      HERNIA REPAIR  2004    ventral      ORIF Shoulder Left      Ventral hernia repair NOS  1987     Family History   Problem Relation Age of Onset     Family History Negative Mother          88 yo     Cancer Father          74 yo brain     Diabetes Maternal Grandfather          89 yo     Alcohol/Drug Paternal Grandfather              Colon Cancer No family hx of         Social History     Socioeconomic History     Marital status:      Spouse name: Not on file     Number of children: Not on file     Years of education: Not on file     Highest education level: Not on file   Occupational History     Employer: SELF   Tobacco Use     Smoking status: Former Smoker     Packs/day: 0.00     Quit date: 3/17/1993     Years since quittin.5     Smokeless tobacco: Never Used   Vaping Use     Vaping Use: Never used   Substance and Sexual Activity     Alcohol use: Not Currently     Drug use: No     Sexual activity: Never   Other Topics Concern     Parent/sibling w/ CABG, MI or angioplasty before 65F 55M? Not Asked   Social History Narrative     Not on file     Social Determinants of Health     Financial Resource Strain: Not on file   Food  Insecurity: Not on file   Transportation Needs: Not on file   Physical Activity: Not on file   Stress: Not on file   Social Connections: Not on file   Intimate Partner Violence: Not on file   Housing Stability: Not on file           Medications  Allergies   Current Outpatient Medications   Medication Sig Dispense Refill     acetic acid 0.25 % irrigation Irrigate with as directed daily Apply to gauze and let soak on wound for 5 minutes prior to dressing changes. 500 mL 3     amLODIPine (NORVASC) 5 MG tablet Take by mouth every 24 hours       aspirin (ASA) 81 MG EC tablet Take by mouth every 24 hours       atorvastatin (LIPITOR) 40 MG tablet Take 40 mg by mouth every 24 hours       blood glucose (NO BRAND SPECIFIED) lancets standard Use to test blood sugar 2 times daily or as directed. 100 lancet 4     blood glucose (NO BRAND SPECIFIED) test strip Use to test blood sugar 2 times daily or as directed. 100 strip 4     blood glucose monitoring (NO BRAND SPECIFIED) meter device kit Use to test blood sugar 2 times daily or as directed. 1 kit 0     colchicine (COLCYRS) 0.6 MG tablet Take 1 tablet (0.6 mg) by mouth daily 90 tablet 1     Ferrous Gluconate 240 (27 Fe) MG TABS Take 1 tablet by mouth daily        finasteride (PROSCAR) 5 MG tablet Take by mouth every 24 hours       fluticasone (FLONASE) 50 MCG/ACT nasal spray Spray 1-2 sprays in nostril every 24 hours       isosorbide mononitrate (IMDUR) 30 MG 24 hr tablet Take 1 tablet (30 mg) by mouth daily 90 tablet 3     labetalol (NORMODYNE) 200 MG tablet Take 200 mg by mouth 2 times daily       Lidocaine (LIDOCARE) 4 % Patch Place 1 patch onto the skin every 24 hours To prevent lidocaine toxicity, patient should be patch free for 12 hrs daily. (Patient taking differently: Place 1 patch onto the skin every 24 hours To prevent lidocaine toxicity, patient should be patch free for 12 hrs daily.  To back every am)       lisinopril (ZESTRIL) 10 MG tablet Take 10 mg by mouth every  "24 hours       loratadine (CLARITIN) 10 MG tablet Take by mouth every 24 hours       metFORMIN (GLUCOPHAGE) 1000 MG tablet Take 1,000 mg by mouth 2 times daily (with meals)       multivitamin w/minerals (THERA-VIT-M) tablet Take 1 tablet by mouth daily       Semaglutide (RYBELSUS) 7 MG TABS Take 7 mg by mouth daily 90 tablet 3     tamsulosin (FLOMAX) 0.4 MG 24 hr capsule Take 1 capsule by mouth daily.       tiZANidine (ZANAFLEX) 2 MG tablet Take 4 mg by mouth At Bedtime       torsemide (DEMADEX) 20 MG tablet Take 1 tablet (20 mg) by mouth daily 90 tablet 2       Allergies   Allergen Reactions     Penicillins Anaphylaxis     8/25/22 - tolerated cefepime      Sulfa Drugs      \"itchy rash, swelling of face and hands\"     Ancef [Cefazolin] Rash     Sulfatolamide Rash          Lab Results    Chemistry/lipid CBC Cardiac Enzymes/BNP/TSH/INR   Recent Labs   Lab Test 06/16/22  0934 03/05/16  0945 02/14/15  0840   CHOL 144   < > 138   HDL 63   < > 55   LDL 67   < > 72   TRIG 69   < > 57   CHOLHDLRATIO  --   --  2.5    < > = values in this interval not displayed.     Recent Labs   Lab Test 06/16/22  0934 09/29/21  0913 04/29/21  1631   LDL 67 48 48     Recent Labs   Lab Test 08/29/22  0802 08/29/22  0640 08/27/22  1138 08/27/22  0710   NA  --   --   --  138   POTASSIUM  --   --   --  4.6   CHLORIDE  --   --   --  108*   CO2  --   --   --  22   GLC  --  131*   < > 131*   BUN  --   --   --  16   CR 1.24  --    < > 1.23   GFRESTIMATED 60*  --    < > 60*   SHANNAN  --   --   --  9.4    < > = values in this interval not displayed.     Recent Labs   Lab Test 08/29/22  0802 08/28/22  0741 08/27/22  0710   CR 1.24 1.25 1.23     Recent Labs   Lab Test 09/09/22  1145 06/16/22  0934 11/01/21  0738   A1C 7.2* 8.6* 7.5*          Recent Labs   Lab Test 09/29/22  0919   WBC 7.7   HGB 11.7*   HCT 37.4*   MCV 90        Recent Labs   Lab Test 09/29/22  0919 09/15/22  1028 08/26/22  0942   HGB 11.7* 10.8* 9.9*    No results for input(s): " TROPONINI in the last 68538 hours.  Recent Labs   Lab Test 02/28/20  1318 02/17/19  0723   NTBNPI  --  633   NTBNP 178  --      Recent Labs   Lab Test 01/24/20  1359   TSH 3.24     Recent Labs   Lab Test 12/25/21 2000 03/04/19  0947 02/26/19  1539   INR 1.17* 1.13 1.04          Medications  Allergies   Current Outpatient Medications   Medication Sig Dispense Refill     acetic acid 0.25 % irrigation Irrigate with as directed daily Apply to gauze and let soak on wound for 5 minutes prior to dressing changes. 500 mL 3     amLODIPine (NORVASC) 5 MG tablet Take by mouth every 24 hours       aspirin (ASA) 81 MG EC tablet Take by mouth every 24 hours       atorvastatin (LIPITOR) 40 MG tablet Take 40 mg by mouth every 24 hours       blood glucose (NO BRAND SPECIFIED) lancets standard Use to test blood sugar 2 times daily or as directed. 100 lancet 4     blood glucose (NO BRAND SPECIFIED) test strip Use to test blood sugar 2 times daily or as directed. 100 strip 4     blood glucose monitoring (NO BRAND SPECIFIED) meter device kit Use to test blood sugar 2 times daily or as directed. 1 kit 0     colchicine (COLCYRS) 0.6 MG tablet Take 1 tablet (0.6 mg) by mouth daily 90 tablet 1     Ferrous Gluconate 240 (27 Fe) MG TABS Take 1 tablet by mouth daily        finasteride (PROSCAR) 5 MG tablet Take by mouth every 24 hours       fluticasone (FLONASE) 50 MCG/ACT nasal spray Spray 1-2 sprays in nostril every 24 hours       isosorbide mononitrate (IMDUR) 30 MG 24 hr tablet Take 1 tablet (30 mg) by mouth daily 90 tablet 3     labetalol (NORMODYNE) 200 MG tablet Take 200 mg by mouth 2 times daily       Lidocaine (LIDOCARE) 4 % Patch Place 1 patch onto the skin every 24 hours To prevent lidocaine toxicity, patient should be patch free for 12 hrs daily. (Patient taking differently: Place 1 patch onto the skin every 24 hours To prevent lidocaine toxicity, patient should be patch free for 12 hrs daily.  To back every am)       lisinopril  "(ZESTRIL) 10 MG tablet Take 10 mg by mouth every 24 hours       loratadine (CLARITIN) 10 MG tablet Take by mouth every 24 hours       metFORMIN (GLUCOPHAGE) 1000 MG tablet Take 1,000 mg by mouth 2 times daily (with meals)       multivitamin w/minerals (THERA-VIT-M) tablet Take 1 tablet by mouth daily       Semaglutide (RYBELSUS) 7 MG TABS Take 7 mg by mouth daily 90 tablet 3     tamsulosin (FLOMAX) 0.4 MG 24 hr capsule Take 1 capsule by mouth daily.       tiZANidine (ZANAFLEX) 2 MG tablet Take 4 mg by mouth At Bedtime       torsemide (DEMADEX) 20 MG tablet Take 1 tablet (20 mg) by mouth daily 90 tablet 2      Allergies   Allergen Reactions     Penicillins Anaphylaxis     8/25/22 - tolerated cefepime      Sulfa Drugs      \"itchy rash, swelling of face and hands\"     Ancef [Cefazolin] Rash     Sulfatolamide Rash        Lab Results   Lab Results   Component Value Date     08/27/2022     04/29/2021    CO2 22 08/27/2022    CO2 27 04/29/2021    BUN 16 08/27/2022    BUN 27 04/29/2021    GFR 15 12/03/2021     Lab Results   Component Value Date    WBC 7.7 09/29/2022    WBC 8.5 04/29/2021    HGB 11.7 09/29/2022    HGB 12.2 04/29/2021    HCT 37.4 09/29/2022    HCT 39.6 04/29/2021    MCV 90 09/29/2022    MCV 92 04/29/2021     09/29/2022     04/29/2021     Lab Results   Component Value Date    CHOL 144 06/16/2022    CHOL 132 04/29/2021    TRIG 69 06/16/2022    TRIG 153 04/29/2021    HDL 63 06/16/2022    HDL 53 04/29/2021     Lab Results   Component Value Date    INR 1.17 12/25/2021    INR 1.13 03/04/2019     Lab Results   Component Value Date    TSH 3.24 01/24/2020         Medical History  Surgical History   Past Medical History:   Diagnosis Date     Arthritis     osteoarthritis knees     BPH      CAD (coronary artery disease)     subtotal occlusion in the small distal LAD      Cardiomyopathy      Cerebral artery occlusion with cerebral infarction     TIA 1993, no residual     Chronic kidney disease  "     Chronic rhinitis      HTN (hypertension)      Hyperlipidemia      Kidney stone      PVD (peripheral vascular disease)      Sleep apnea     doesn't use cpap     TIA (transient ischaemic attack) 1993     Type 2 diabetes mellitus - 11/08/2018     Ureteral stone       Past Surgical History:   Procedure Laterality Date     AMPUTATE TOE(S) Left 10/15/2018    Procedure: AMPUTATE TOE(S);  Left third toe amputation;  Surgeon: Ayaka Azevedo DPM, Podiatry/Foot and Ankle Surgery;  Location: [facility] OR     ARTHROPLASTY KNEE Right 12/07/2018    Procedure: Right total knee arthroplasty;  Surgeon: Issa Cunha MD;  Location:  OR     COLONOSCOPY  03/09/2013    Procedure: COLONOSCOPY;  COLONOSCOPY;  Surgeon: Chau Hogan MD;  Location:  GI     CV HEART CATHETERIZATION WITH POSSIBLE INTERVENTION Left 03/05/2019    Procedure: Coronary Angiogram;  Surgeon: Nas Linda MD;  Location:  HEART CARDIAC CATH LAB     Diastasis recti repair  1985     EXTRACAPSULAR CATARACT EXTRATION WITH INTRAOCULAR LENS IMPLANT Left 03/13/2017     EXTRACAPSULAR CATARACT EXTRATION WITH INTRAOCULAR LENS IMPLANT Right      FOOT SURGERY  04/2013    cyst removal      HERNIA REPAIR  07/13/2004    ventral      ORIF Shoulder Left      Ventral hernia repair NOS  1987

## 2022-09-30 LAB
ATRIAL RATE - MUSE: 110 BPM
DIASTOLIC BLOOD PRESSURE - MUSE: NORMAL MMHG
INTERPRETATION ECG - MUSE: NORMAL
P AXIS - MUSE: 54 DEGREES
PR INTERVAL - MUSE: 188 MS
QRS DURATION - MUSE: 114 MS
QT - MUSE: 340 MS
QTC - MUSE: 460 MS
R AXIS - MUSE: -23 DEGREES
SYSTOLIC BLOOD PRESSURE - MUSE: NORMAL MMHG
T AXIS - MUSE: 93 DEGREES
VENTRICULAR RATE- MUSE: 110 BPM

## 2022-09-30 NOTE — TELEPHONE ENCOUNTER
Thu was wondering how often the acetic acid should be used, informed her with the weekly dressing change.

## 2022-09-30 NOTE — TELEPHONE ENCOUNTER
Writer spoke with Demarco at [facility], informed her writer just spoke with Thu regarding acetic acid and dressing orders below.     Dressing Change lnstructions     - Dressing Application of  Endoform     1. Endoform should be cut to the size of the wound.  It should touch the edges of the ulcer. It is okay if it overlap the edges a small amount.  Then, moisten the Endoform with saline.(If it is easier for you, you can moisten it before laying it in the wound)     2. Cover the wound with Endoform, followed by dry gauze, and secure with roll gauze if needed.     3. Endoform is naturally used by the body over time so you may not find it in the wound when you change your dressing.  If you do find some, gently cleanse the wound with saline. Do not remove the remaining Endoform, which may appear as an off-white to diana gel, just add Endoform on top.  Usually, more Endoform will need to be added every 5-7 days.     4. Change your top dressing every 1-2 days or as needed depending on drainage.     -Endoform is a collagen dressing created from ovine (sheep) fore-stomach tissue. The collagen extracellular matrix transforms into a soft     It IS NOT ok to get your wound wet in the bath or shower    longer than this please contact our office at [phone].        OK NOT given for medications and applying bacitracin and cover with bandaid. She stated that she will speak with Thu and contact PCP regarding medications.

## 2022-09-30 NOTE — TELEPHONE ENCOUNTER
Requesting verbal orders:    tivanidine 2mg capsules 1 tablet 3x/daily as needed    Celecoxib 200mg capsules 1 tablet every night at bednight and 1 tablet as needed daily    Wound care orders: diabetic ulcer right 3rd toe.  Apply bacitracin and cover with bandaid and change daily.     Add daily acetic acid soak to right great toe for 5 minutes daily. Apply gauze and secure.     Call back number for verbal orders:   [phone]

## 2022-09-30 NOTE — TELEPHONE ENCOUNTER
[facility] calling from [facility] stating they received the verbal orders however the frequency wasn't mentioned.  Please call her back at [phone] with an update.

## 2022-10-03 ENCOUNTER — TRANSFERRED RECORDS (OUTPATIENT)
Dept: HEALTH INFORMATION MANAGEMENT | Facility: CLINIC | Age: 77
End: 2022-10-03

## 2022-10-04 ENCOUNTER — TELEPHONE (OUTPATIENT)
Dept: CARDIOLOGY | Facility: CLINIC | Age: 77
End: 2022-10-04

## 2022-10-04 DIAGNOSIS — I50.32 CHRONIC DIASTOLIC HEART FAILURE (H): Primary | ICD-10-CM

## 2022-10-04 DIAGNOSIS — L97.509 TYPE 2 DIABETES MELLITUS WITH FOOT ULCER, WITHOUT LONG-TERM CURRENT USE OF INSULIN (H): Primary | ICD-10-CM

## 2022-10-04 DIAGNOSIS — E11.621 TYPE 2 DIABETES MELLITUS WITH FOOT ULCER, WITHOUT LONG-TERM CURRENT USE OF INSULIN (H): Primary | ICD-10-CM

## 2022-10-04 DIAGNOSIS — E78.5 DYSLIPIDEMIA: Primary | ICD-10-CM

## 2022-10-04 NOTE — TELEPHONE ENCOUNTER
[facility] Call Center    Phone Message    May a detailed message be left on voicemail: yes     Reason for Call: Medication Refill Request    Has the patient contacted the pharmacy for the refill? Yes   Name of medication being requested: atorvastatin (LIPITOR) 40 MG tablet  Provider who prescribed the medication: N/A  Pharmacy:   [facility] - [address]  Date medication is needed: 10/5/22      Action Taken: Message routed to:  Other: Cardiology    Travel Screening: Not Applicable     Thank you!  [facility]

## 2022-10-04 NOTE — TELEPHONE ENCOUNTER
Pending Prescriptions:                       Disp   Refills    metFORMIN (GLUCOPHAGE) 1000 MG tablet     180 ta*0            Sig: Take 1 tablet (1,000 mg) by mouth 2 times daily           (with meals)    Pt has 5 days left.   Neurology

## 2022-10-05 RX ORDER — LABETALOL 200 MG/1
200 TABLET, FILM COATED ORAL 2 TIMES DAILY
Qty: 180 TABLET | Refills: 3 | Status: SHIPPED | OUTPATIENT
Start: 2022-10-05 | End: 2022-10-12

## 2022-10-05 RX ORDER — LISINOPRIL 10 MG/1
10 TABLET ORAL EVERY 24 HOURS
Qty: 90 TABLET | Refills: 3 | Status: SHIPPED | OUTPATIENT
Start: 2022-10-05

## 2022-10-05 RX ORDER — ATORVASTATIN CALCIUM 40 MG/1
40 TABLET, FILM COATED ORAL EVERY 24 HOURS
Qty: 90 TABLET | Refills: 3 | Status: SHIPPED | OUTPATIENT
Start: 2022-10-05 | End: 2023-08-23

## 2022-10-06 ENCOUNTER — OFFICE VISIT (OUTPATIENT)
Dept: VASCULAR SURGERY | Facility: CLINIC | Age: 77
End: 2022-10-06
Attending: PODIATRIST
Payer: COMMERCIAL

## 2022-10-06 VITALS
SYSTOLIC BLOOD PRESSURE: 122 MMHG | RESPIRATION RATE: 18 BRPM | HEART RATE: 96 BPM | DIASTOLIC BLOOD PRESSURE: 60 MMHG | TEMPERATURE: 98.1 F

## 2022-10-06 DIAGNOSIS — E11.621 DIABETIC ULCER OF TOE OF RIGHT FOOT ASSOCIATED WITH TYPE 2 DIABETES MELLITUS, UNSPECIFIED ULCER STAGE (H): Primary | ICD-10-CM

## 2022-10-06 DIAGNOSIS — I87.8 VENOUS STASIS: ICD-10-CM

## 2022-10-06 DIAGNOSIS — L97.519 DIABETIC ULCER OF TOE OF RIGHT FOOT ASSOCIATED WITH TYPE 2 DIABETES MELLITUS, UNSPECIFIED ULCER STAGE (H): Primary | ICD-10-CM

## 2022-10-06 PROCEDURE — 11042 DBRDMT SUBQ TIS 1ST 20SQCM/<: CPT | Performed by: PODIATRIST

## 2022-10-06 RX ORDER — PREDNISONE 10 MG/1
2 TABLET ORAL 3 TIMES DAILY
COMMUNITY
Start: 2022-10-03 | End: 2022-10-09

## 2022-10-06 ASSESSMENT — PAIN SCALES - GENERAL: PAINLEVEL: NO PAIN (0)

## 2022-10-06 NOTE — PATIENT INSTRUCTIONS
Important lnstructions    Please bring pt his meals to his room, this will help limit pt from walking on the right foot.     WEIGHT BEARING STATUS: You are to remain NON WEIGHT BEARING on your right foot. NON WEIGHT BEARING MEANS NO PRESSURE ON YOUR FOOT OR HEEL AT ANY TIME FOR ANY REASON!    2. OFFLOADING DEVICE: Must use a A WHEELCHAIR at all times! (do not use affected foot to push wheelchair)      4. ELEVATE: Elevating your leg means laying with your head on a pillow and your foot ABOVE YOUR WAIST.     5. DO NOT MOVE YOUR FOOT.  There is a risk of worsening the wound or incision. To give yourself a higher chance of healing, please DO NOT swing foot back and forth and wiggle foot/toes especially when inside a stabilization device.        Dressing Change lnstructions            If you see green drainage from the wound- use the acetic acid. Otherwise no need to use.       - Dressing Application of  Endoform    1. Endoform should be cut to the size of the wound.  It should touch the edges of the ulcer. It is okay if it overlap the edges a small amount.  Then, moisten the Endoform with saline.(If it is easier for you, you can moisten it before laying it in the wound)    2. Cover the wound with Endoform, followed by dry gauze, and secure with roll gauze if needed.     3. Endoform is naturally used by the body over time so you may not find it in the wound when you change your dressing.  If you do find some, gently cleanse the wound with saline. Do not remove the remaining Endoform, which may appear as an off-white to diana gel, just add Endoform on top.  Usually, more Endoform will need to be added every 5-7 days.     4. Change your top dressing every 1-2 days or as needed depending on drainage.    -Endoform is a collagen dressing created from ovine (sheep) fore-stomach tissue. The collagen extracellular matrix transforms into a soft conforming sheet, which is naturally incorporated into the wound over time.  Collagen  dressings are used to stimulate wound healing.   ,       It IS NOT ok to get your wound wet in the bath or shower    SEEK MEDICAL CARE IF:  You have an increase in swelling, pain, or redness around the wound.  You have an increase in the amount of pus coming from the wound.  There is a bad smell coming from the wound.  The wound appears to be worsening/enlarging  You have a fever greater than 101.5 F      It is ok to continue current wound care treatment/products for the next 2-3 days until new wound care supplies are ordered and arrive. If longer than this please contact our office at [phone].        We want to hear from you!   In the next few weeks, you should receive a call or email to complete a survey about your visit at [facility]. Please help us improve your appointment experience by letting us know how we did today. We strive to make your experience good and value any ways in which we could do better.      We value your input and suggestions.    Thank you for choosing the [facility]!

## 2022-10-06 NOTE — PROGRESS NOTES
RIGHT HALLUX    [facility]      Patient is in clinic today to see MD Nakul Salazar      Patient is here for a 3wk follow up  to discuss and evaluate wound(s) located on right hallux.     Patient reports:  Change in status of the wound:  No     Change of drainage- color, amount, odor:  Scant drainage of light greenish yellowish noted on gauze    Change of swelling:  No. He did not wear his compression stocking but brought it in.     Change in Pain status:  No     New wounds developed:  No         Wound info:          VASC Wound right hallux (Active)   Pre Size Length 0.9 10/06/22 1300   Pre Size Width 1.1 10/06/22 1300   Pre Size Depth 0.2 10/06/22 1300   Pre Total Sq cm 0.99 10/06/22 1300   Undermining 0.4 @ 8o'clock 10/06/22 1300         Has homecare services and agency name:  Yes: [facility]       Questions patient would like addressed today are: N/A.          Dami Heck RN

## 2022-10-06 NOTE — PROGRESS NOTES
FOOT AND ANKLE SURGERY/PODIATRY Progress Note      ASSESSMENT:   Diabetic Ulceration right hallux  Venous Stasis       TREATMENT:  -The right hallux ulcer is measuring smaller today. I recommend he continue with endoform and non-weight bearing with knee walker.    -I have referred him to the Lymphedema clinic for compression recommendations.     -After discussion of risk factors and consent obtained 2% Lidocaine HCL jelly was applied, under clean conditions, the right and foot ulceration(s) were debrided using #15 blade scalpel.  Devitalized and nonviable tissue, along with any fibrin and slough, was removed to improve granulation tissue formation, stimulate wound healing, decrease overall bacteria load, disrupt biofilm formation and decrease edge senescence. Wound drainage was scant No. Total excisional debridement was 0.99 sq cm into the subcutaneous tissue with a depth of 0.2 cm.   Ulcers were improved afterwards and .  Measures were as noted on the flow sheet. Collagen with a gauze dresssing was applied. He will continue to apply Collagen with a gauze dresssing as directed.    -He will follow-up in 3 weeks.    Nakul Salazar DPM  [facility] [place]      HPI: Lance Ibrahim was seen again today for a sore on his right hallux. He has been using a knee walker to remain mostly non-weight bearing. He was seen by ID, no antibiotic recommendations at this time.     Past Medical History:   Diagnosis Date     Arthritis     osteoarthritis knees     BPH      CAD (coronary artery disease)     subtotal occlusion in the small distal LAD      Cardiomyopathy      Cerebral artery occlusion with cerebral infarction     TIA 1993, no residual     Chronic kidney disease      Chronic rhinitis      HTN (hypertension)      Hyperlipidemia      Kidney stone      PVD (peripheral vascular disease)      Sleep apnea     doesn't use cpap     TIA (transient ischaemic attack) 1993     Type 2 diabetes mellitus - 11/08/2018  "    Ureteral stone        Past Surgical History:   Procedure Laterality Date     AMPUTATE TOE(S) Left 10/15/2018    Procedure: AMPUTATE TOE(S);  Left third toe amputation;  Surgeon: Ayaka Azevedo DPM, Podiatry/Foot and Ankle Surgery;  Location: [facility] OR     ARTHROPLASTY KNEE Right 12/07/2018    Procedure: Right total knee arthroplasty;  Surgeon: Issa Cunha MD;  Location: [facility] OR     COLONOSCOPY  03/09/2013    Procedure: COLONOSCOPY;  COLONOSCOPY;  Surgeon: Chau Hogan MD;  Location:  GI     CV HEART CATHETERIZATION WITH POSSIBLE INTERVENTION Left 03/05/2019    Procedure: Coronary Angiogram;  Surgeon: Nas Linda MD;  Location:  HEART CARDIAC CATH LAB     Diastasis recti repair  1985     EXTRACAPSULAR CATARACT EXTRATION WITH INTRAOCULAR LENS IMPLANT Left 03/13/2017     EXTRACAPSULAR CATARACT EXTRATION WITH INTRAOCULAR LENS IMPLANT Right      FOOT SURGERY  04/2013    cyst removal      HERNIA REPAIR  07/13/2004    ventral      ORIF Shoulder Left      Ventral hernia repair NOS  1987       Allergies   Allergen Reactions     Penicillins Anaphylaxis     8/25/22 - tolerated cefepime      Sulfa Drugs      \"itchy rash, swelling of face and hands\"     Ancef [Cefazolin] Rash     Sulfatolamide Rash         Current Outpatient Medications:      acetic acid 0.25 % irrigation, Irrigate with as directed daily Apply to gauze and let soak on wound for 5 minutes prior to dressing changes., Disp: 500 mL, Rfl: 3     amLODIPine (NORVASC) 5 MG tablet, Take by mouth every 24 hours, Disp: , Rfl:      aspirin (ASA) 81 MG EC tablet, Take by mouth every 24 hours, Disp: , Rfl:      atorvastatin (LIPITOR) 40 MG tablet, Take 1 tablet (40 mg) by mouth every 24 hours, Disp: 90 tablet, Rfl: 3     blood glucose (NO BRAND SPECIFIED) lancets standard, Use to test blood sugar 2 times daily or as directed., Disp: 100 lancet, Rfl: 4     blood glucose (NO BRAND SPECIFIED) test strip, Use to test blood sugar 2 times daily or as " directed., Disp: 100 strip, Rfl: 4     blood glucose monitoring (NO BRAND SPECIFIED) meter device kit, Use to test blood sugar 2 times daily or as directed., Disp: 1 kit, Rfl: 0     colchicine (COLCYRS) 0.6 MG tablet, Take 1 tablet (0.6 mg) by mouth daily, Disp: 90 tablet, Rfl: 1     Ferrous Gluconate 240 (27 Fe) MG TABS, Take 1 tablet by mouth daily , Disp: , Rfl:      finasteride (PROSCAR) 5 MG tablet, Take by mouth every 24 hours, Disp: , Rfl:      fluticasone (FLONASE) 50 MCG/ACT nasal spray, Spray 1-2 sprays in nostril every 24 hours, Disp: , Rfl:      isosorbide mononitrate (IMDUR) 30 MG 24 hr tablet, Take 1 tablet (30 mg) by mouth daily, Disp: 90 tablet, Rfl: 3     labetalol (NORMODYNE) 200 MG tablet, Take 1 tablet (200 mg) by mouth 2 times daily, Disp: 180 tablet, Rfl: 3     Lidocaine (LIDOCARE) 4 % Patch, Place 1 patch onto the skin every 24 hours To prevent lidocaine toxicity, patient should be patch free for 12 hrs daily. (Patient taking differently: Place 1 patch onto the skin every 24 hours To prevent lidocaine toxicity, patient should be patch free for 12 hrs daily.  To back every am), Disp: , Rfl:      lisinopril (ZESTRIL) 10 MG tablet, Take 1 tablet (10 mg) by mouth every 24 hours, Disp: 90 tablet, Rfl: 3     loratadine (CLARITIN) 10 MG tablet, Take by mouth every 24 hours, Disp: , Rfl:      metFORMIN (GLUCOPHAGE) 1000 MG tablet, Take 1 tablet (1,000 mg) by mouth 2 times daily (with meals), Disp: 180 tablet, Rfl: 0     multivitamin w/minerals (THERA-VIT-M) tablet, Take 1 tablet by mouth daily, Disp: , Rfl:      predniSONE (DELTASONE) 10 MG tablet, Take 2 tablets by mouth 3 times daily, Disp: , Rfl:      Semaglutide (RYBELSUS) 7 MG TABS, Take 7 mg by mouth daily, Disp: 90 tablet, Rfl: 3     tamsulosin (FLOMAX) 0.4 MG 24 hr capsule, Take 1 capsule by mouth daily., Disp: , Rfl:      tiZANidine (ZANAFLEX) 2 MG tablet, Take 4 mg by mouth At Bedtime, Disp: , Rfl:      torsemide (DEMADEX) 20 MG tablet, Take  1 tablet (20 mg) by mouth daily, Disp: 90 tablet, Rfl: 2    Review of Systems - 10 point Review of Systems is negative except for right hallux ulcer which is noted in HPI.      OBJECTIVE:  /60   Pulse 96   Temp 98.1  F (36.7  C)   Resp 18   General appearance: Patient is alert and fully cooperative with history & exam.  No sign of distress is noted during the visit.    Vascular: Diffuse edema right leg.   Dermatologic:    Wound 10/14/18 Foot Abrasion(s) GRAYISH DISCOLORATION (Active)       Wound 12/07/18 Left;Lower Foot Amputation blackened area third left toe amputation incision site (Active)       Wound 07/27/20 Left Toe (Comment  which one) Ulceration Left foot pinky toe (Active)       Wound 07/27/20 Right Toe (Comment  which one) Ulceration Right great toe wound  (Active)       Wound Toe (Comment  which one) Ulceration (Active)       Wound Foot Ulceration (Active)       Wound Foot Ulceration (Active)       Rash 10/21/18 1543 Bilateral other (see comments) (Active)       Rash 07/27/20 2015 Left lower leg other (see comments) (Active)       VASC Wound right hallux (Active)   Pre Size Length 0.9 10/06/22 1300   Pre Size Width 1.1 10/06/22 1300   Pre Size Depth 0.2 10/06/22 1300   Pre Total Sq cm 0.99 10/06/22 1300   Undermined 0.4 @ 8o'clock 10/06/22 1300       Incision/Surgical Site 10/15/18 Left Foot (Active)       Incision/Surgical Site 12/07/18 Right Knee (Active)   Granular base right hallux ulcer, no erythema right lower extremity.   Neurologic: Diminished to light touch Right.  Musculoskeletal: Contracted digits noted Right.    Imaging:     No results found.       Picture:

## 2022-10-12 ENCOUNTER — TELEPHONE (OUTPATIENT)
Dept: CARDIOLOGY | Facility: CLINIC | Age: 77
End: 2022-10-12

## 2022-10-12 DIAGNOSIS — I50.32 CHRONIC DIASTOLIC HEART FAILURE (H): ICD-10-CM

## 2022-10-12 RX ORDER — LABETALOL 200 MG/1
TABLET, FILM COATED ORAL
Qty: 180 TABLET | Refills: 2 | Status: SHIPPED | OUTPATIENT
Start: 2022-10-12 | End: 2023-11-30

## 2022-10-12 NOTE — TELEPHONE ENCOUNTER
I returned a fax from [facility] requesting a refill for Atorvastatin. Patient has changed doctors to Dr. Amaya at [facility]. They ordered his Atorvastatin at [facility].

## 2022-10-17 ENCOUNTER — HOSPITAL ENCOUNTER (OUTPATIENT)
Dept: PHYSICAL THERAPY | Facility: REHABILITATION | Age: 77
Discharge: HOME | End: 2022-10-17
Payer: COMMERCIAL

## 2022-10-17 DIAGNOSIS — I89.0 LYMPHEDEMA: Primary | ICD-10-CM

## 2022-10-17 PROCEDURE — 97535 SELF CARE MNGMENT TRAINING: CPT | Mod: GP | Performed by: PHYSICAL THERAPIST

## 2022-10-17 PROCEDURE — 97161 PT EVAL LOW COMPLEX 20 MIN: CPT | Mod: GP | Performed by: PHYSICAL THERAPIST

## 2022-10-17 PROCEDURE — 97140 MANUAL THERAPY 1/> REGIONS: CPT | Mod: GP | Performed by: PHYSICAL THERAPIST

## 2022-10-17 NOTE — PROGRESS NOTES
Rehabilitation Services        OUTPATIENT PHYSICAL THERAPY EDEMA EVALUATION  PLAN OF TREATMENT FOR OUTPATIENT REHABILITATION  (COMPLETE FOR INITIAL CLAIMS ONLY)  Patient's Last Name, First Name, Lance Nguyễn                           Provider s Name:   Tori Watts PT Medical Record No.  [id number]     Start of Care Date:  10/17/22   Onset Date:  08/25/22   Type:  PT   Medical Diagnosis:  Venous Stasis, Secondary Lymphedema   Therapy Diagnosis:  Right LE lymphedema secondary to venous stasis, right LE surgery, multiple infections Visits from SOC:  1                                     __________________________________________________________________________________   Plan of Treatment/Functional Goals:    Manual lymph drainage, Gradient compression bandaging, Fit for compression garment, Exercises, Education, Precautions to prevent infection / exacerbation, Manual therapy, Wound care / dressing changes, Skin care / precautions, Scar mobilization, Soft tissue mobilization, Myofascial release, Home management program development        GOALS  1. Goal description: patient will reduce right arch of foot circumference to 26.5 or less demonstrating reduced foot swelling for improved healing       Target date: 01/13/23  2. Goal description: patient will have right great toe ulcer healed for reduced infection risk       Target date: 01/13/23          Treatment Frequency:  (1-2 times per week as needed and avalable)   Treatment duration: 18 visits as needed    Tori Watts, PT                                  I CERTIFY THE NEED FOR THESE SERVICES FURNISHED UNDER THIS PLAN OF TREATMENT AND WHILE UNDER MY CARE     (Physician signature in associated progress note indicates review and certification of the therapy plan)                  Certification date from: 10/17/22       Certification date to: 01/13/23            Referring physician: Dr. Salazar   Initial Assessment  See Epic Evaluation- Start of care: 10/17/22                    10/17/22 1100   Quick Adds   Quick Adds Certification   Rehab Discipline   Discipline PT   Type of Visit   Type of visit Initial Edema Evaluation   General Information   Start of care 10/17/22   Referring physician Dr. Salazar   Orders Evaluate and treat as indicated   Order date 10/06/22   Medical diagnosis Venous stasis   Onset of illness / date of surgery 08/25/22   Edema onset 08/25/22   Affected body parts RLE   Edema etiology Infection;Chronic Venous Insufficiency;Ulcers;TKA   Edema etiology comments patient with lymphedema secondary to chronic venous stasis, right TKA, decreased mobility   Pertinent history of current problem (PT: include personal factors and/or comorbidities that impact the POC; OT: include additional occupational profile info) patient also with right hip pain and limited ROM   Surgical / medical history reviewed Yes   Fall Risk Screen   Fall screen completed by PT   Have you fallen 2 or more times in the past year? No   Have you fallen and had an injury in the past year? No   Is patient a fall risk? Yes   Fall screen comments patient with limited mobility d/t right TKA, right hip pain, uses cane prior to his knee scooter   Abuse Screen (yes response referral indicated)   Feels Unsafe at Home or Work/School no   Feels Threatened by Someone no   Does Anyone Try to Keep You From Having Contact with Others or Doing Things Outside Your Home? no   Physical Signs of Abuse Present no   Subjective Report   Patient report of symptoms Patient comes to PT with right great toe ulcer that started Jan 2021. This was his first cellulitis infection as well. He then, had a second cellulitis infection July 2021 and a third cellulitis infection Aug 2022. He has had the great toe ulcer since January 2021. He states his swelling is significantly improved since his most recent infection in  August. He is not wearing compression stockings on a regular basis. He has not seem lymphedema therapy in the past. He started wearing walking shoe Jan 2021 and continues to do so. He started using knee scooter for non weight bearing in Sept 2022 per the recommendation of referring provider. He has started a new collagen wound care material to help with wound healing and he notes improvement with the ulcer. Left LE has no swelling. Patient is a diabetic. Blood sugars starting being more controlled July 2022. He has neuropathy that affects both LEs. He has a small superficial wound on left medial great to MTP, he states two days ago, he clipped off a callous and this is what occurred.   Precautions   Precautions comments diabetic, ulcer, non weigth bearing   Pain   Pain comments no pain   Edema Exam / Assessment   Skin condition Pitting   Skin condition comments hyperplasia dorsum of foot and lower leg, dry skin, red, warm   Pitting 1+   Pitting location dorsum of foot and lower pretibial   Stemmer sign Positive   Ulceration Yes   Ulceration comments right great toe wound not observed by PT today d/t wound nurse doing the wound care changes on Wednesdays weekly. patient did not have supplies in clinic to replace. Patient has pictures on his phone of the wound and is being treated by podiatry and nursing for the wound care itself.   Girth Measurements   Girth Measurements Refer to separate girth measurement flowsheet   Range of Motion   ROM comments limited right hip ROM d/t pain and OA   Sensory   Sensory perception comments decreased sensation right   Planned Edema Interventions   Planned edema interventions Manual lymph drainage;Gradient compression bandaging;Fit for compression garment;Exercises;Education;Precautions to prevent infection / exacerbation;Manual therapy;Wound care / dressing changes;Skin care / precautions;Scar mobilization;Soft tissue mobilization;Myofascial release;Home management program  development   Clinical Impression   Criteria for skilled therapeutic intervention met Yes   Therapy diagnosis Right LE lymphedema secondary to venous stasis, right LE surgery, multiple infections   Influenced by the following impairments / conditions Edema;Stage 3;Wounds / Ulcers   Functional limitations due to impairments / conditions ulcer healing, infection risk, donning clothing/shoes   Clinical Presentation Stable/Uncomplicated   Clinical Presentation Rationale comorbidities   Clinical Decision Making (Complexity) Low complexity   Treatment Frequency   (1-2 times per week as needed and avalable)   Treatment duration 18 visits as needed   Patient / family and/or staff in agreement with plan of care Yes   Risks and benefits of therapy have been explained Yes   Clinical impression comments Assessment: Patient comes to PT with Right LE lymphedema, history of multiple cellulitis infections in right LE and a current great toe ulcer that opened January 2022. He would benefit from PT to improve his right LE swelling and therefore, hopefully helping his right great toe healing. Patient was educated on this process patient agrees with the plan to add PT visits for PT to help with wrapping and Manual lymphatic drainage. He is going to check on home care lymphedema therapy as well because he lives in an assisted living and is home bound.   Goals   Edema Eval Goals 1;2   Goal 1   Goal identifier swelling   Goal description patient will reduce right arch of foot circumference to 26.5 or less demonstrating reduced foot swelling for improved healing   Target date 01/13/23   Goal 2   Goal identifier great toe   Goal description patient will have right great toe ulcer healed for reduced infection risk   Target date 01/13/23   Total Evaluation Time   PT Shiloh Low Complexity Minutes (21402) 20   Certification   Certification date from 10/17/22   Certification date to 01/13/23   Medical Diagnosis Venous Stasis, Secondary Lymphedema    Certification I certify the need for these services furnished under this plan of treatment and while under my care.  (Physician co-signature of this document indicates review and certification of the therapy plan).

## 2022-10-19 ENCOUNTER — HOSPITAL ENCOUNTER (OUTPATIENT)
Dept: PHYSICAL THERAPY | Facility: REHABILITATION | Age: 77
Discharge: HOME | End: 2022-10-19
Payer: COMMERCIAL

## 2022-10-19 DIAGNOSIS — I89.0 LYMPHEDEMA: Primary | ICD-10-CM

## 2022-10-19 PROCEDURE — 97535 SELF CARE MNGMENT TRAINING: CPT | Mod: GP | Performed by: PHYSICAL THERAPIST

## 2022-10-19 PROCEDURE — 97140 MANUAL THERAPY 1/> REGIONS: CPT | Mod: GP | Performed by: PHYSICAL THERAPIST

## 2022-10-27 ENCOUNTER — OFFICE VISIT (OUTPATIENT)
Dept: VASCULAR SURGERY | Facility: CLINIC | Age: 77
End: 2022-10-27
Attending: PODIATRIST
Payer: COMMERCIAL

## 2022-10-27 VITALS
SYSTOLIC BLOOD PRESSURE: 126 MMHG | DIASTOLIC BLOOD PRESSURE: 68 MMHG | RESPIRATION RATE: 12 BRPM | OXYGEN SATURATION: 96 % | HEART RATE: 88 BPM | TEMPERATURE: 98.2 F | WEIGHT: 225 LBS | BODY MASS INDEX: 30.52 KG/M2

## 2022-10-27 DIAGNOSIS — L97.519 DIABETIC ULCER OF TOE OF RIGHT FOOT ASSOCIATED WITH TYPE 2 DIABETES MELLITUS, UNSPECIFIED ULCER STAGE (H): Primary | ICD-10-CM

## 2022-10-27 DIAGNOSIS — E11.621 DIABETIC ULCER OF TOE OF RIGHT FOOT ASSOCIATED WITH TYPE 2 DIABETES MELLITUS, UNSPECIFIED ULCER STAGE (H): Primary | ICD-10-CM

## 2022-10-27 PROCEDURE — 11042 DBRDMT SUBQ TIS 1ST 20SQCM/<: CPT | Performed by: PODIATRIST

## 2022-10-27 ASSESSMENT — PAIN SCALES - GENERAL: PAINLEVEL: NO PAIN (0)

## 2022-10-27 NOTE — PATIENT INSTRUCTIONS
Important lnstructions        WEIGHT BEARING STATUS: You are to remain NON WEIGHT BEARING on your right foot. NON WEIGHT BEARING MEANS NO PRESSURE ON YOUR FOOT OR HEEL AT ANY TIME FOR ANY REASON!    2. OFFLOADING DEVICE: Must use a A WHEELCHAIR at all times! (do not use affected foot to push wheelchair)      4. ELEVATE: Elevating your leg means laying with your head on a pillow and your foot ABOVE YOUR WAIST.     5. DO NOT MOVE YOUR FOOT.  There is a risk of worsening the wound or incision. To give yourself a higher chance of healing, please DO NOT swing foot back and forth and wiggle foot/toes especially when inside a stabilization device.        Dressing Change lnstructions            If you see green drainage from the wound- use the acetic acid. Otherwise no need to use.       - Dressing Application of  Endoform    1. Endoform should be cut to the size of the wound.  It should touch the edges of the ulcer. It is okay if it overlap the edges a small amount.  Then, moisten the Endoform with saline.(If it is easier for you, you can moisten it before laying it in the wound)    2. Cover the wound with Endoform, followed by dry gauze, and secure with roll gauze if needed.     3. Endoform is naturally used by the body over time so you may not find it in the wound when you change your dressing.  If you do find some, gently cleanse the wound with saline. Do not remove the remaining Endoform, which may appear as an off-white to diana gel, just add Endoform on top.  Usually, more Endoform will need to be added every 5-7 days.     4. Change your top dressing every 1-2 days or as needed depending on drainage.    -Endoform is a collagen dressing created from ovine (sheep) fore-stomach tissue. The collagen extracellular matrix transforms into a soft conforming sheet, which is naturally incorporated into the wound over time.  Collagen dressings are used to stimulate wound healing.   ,       It IS NOT ok to get your wound wet in  the bath or shower    SEEK MEDICAL CARE IF:  You have an increase in swelling, pain, or redness around the wound.  You have an increase in the amount of pus coming from the wound.  There is a bad smell coming from the wound.  The wound appears to be worsening/enlarging  You have a fever greater than 101.5 F      It is ok to continue current wound care treatment/products for the next 2-3 days until new wound care supplies are ordered and arrive. If longer than this please contact our office at [phone].        We want to hear from you!   In the next few weeks, you should receive a call or email to complete a survey about your visit at [facility]. Please help us improve your appointment experience by letting us know how we did today. We strive to make your experience good and value any ways in which we could do better.      We value your input and suggestions.    Thank you for choosing the [facility]!

## 2022-10-27 NOTE — PROGRESS NOTES
FOOT AND ANKLE SURGERY/PODIATRY Progress Note      ASSESSMENT:   Diabetic Ulceration right hallux  Venous Stasis       TREATMENT:  -The right hallux ulcer is measuring smaller today. I recommend he continue with endoform and non-weight bearing with knee walker.     -Continue with Lymphedema clinic for compression recommendations.      -After discussion of risk factors and consent obtained 2% Lidocaine HCL jelly was applied, under clean conditions, the right and foot ulceration(s) were debrided using #15 blade scalpel.  Devitalized and nonviable tissue, along with any fibrin and slough, was removed to improve granulation tissue formation, stimulate wound healing, decrease overall bacteria load, disrupt biofilm formation and decrease edge senescence. Wound drainage was scant No. Total excisional debridement was 0.99 sq cm into the subcutaneous tissue with a depth of 0.2 cm.   Ulcers were improved afterwards and .  Measures were as noted on the flow sheet. Collagen with a gauze dresssing was applied. He will continue to apply Collagen with a gauze dresssing as directed.     -He will follow-up in 3 weeks.    Nakul Salazar DPM  [facility] [place]      HPI: Devaughn Pavsantos was seen again today for a sore on the right hallux. He has remained mostly non-weight bearing with the knee walker.      Past Medical History:   Diagnosis Date     Arthritis     osteoarthritis knees     BPH      CAD (coronary artery disease)     subtotal occlusion in the small distal LAD      Cardiomyopathy      Cerebral artery occlusion with cerebral infarction     TIA 1993, no residual     Chronic kidney disease      Chronic rhinitis      HTN (hypertension)      Hyperlipidemia      Kidney stone      PVD (peripheral vascular disease)      Sleep apnea     doesn't use cpap     TIA (transient ischaemic attack) 1993     Type 2 diabetes mellitus - 11/08/2018     Ureteral stone        Past Surgical History:   Procedure Laterality Date  "    AMPUTATE TOE(S) Left 10/15/2018    Procedure: AMPUTATE TOE(S);  Left third toe amputation;  Surgeon: Ayaka Azevedo DPM, Podiatry/Foot and Ankle Surgery;  Location: [facility] OR     ARTHROPLASTY KNEE Right 12/07/2018    Procedure: Right total knee arthroplasty;  Surgeon: Issa Cunha MD;  Location: [facility] OR     COLONOSCOPY  03/09/2013    Procedure: COLONOSCOPY;  COLONOSCOPY;  Surgeon: Chau Hogan MD;  Location:  GI     CV HEART CATHETERIZATION WITH POSSIBLE INTERVENTION Left 03/05/2019    Procedure: Coronary Angiogram;  Surgeon: Nas Linda MD;  Location:  HEART CARDIAC CATH LAB     Diastasis recti repair  1985     EXTRACAPSULAR CATARACT EXTRATION WITH INTRAOCULAR LENS IMPLANT Left 03/13/2017     EXTRACAPSULAR CATARACT EXTRATION WITH INTRAOCULAR LENS IMPLANT Right      FOOT SURGERY  04/2013    cyst removal      HERNIA REPAIR  07/13/2004    ventral      ORIF Shoulder Left      Ventral hernia repair NOS  1987       Allergies   Allergen Reactions     Penicillins Anaphylaxis     8/25/22 - tolerated cefepime      Sulfa Drugs      \"itchy rash, swelling of face and hands\"     Ancef [Cefazolin] Rash         Current Outpatient Medications:      acetic acid 0.25 % irrigation, Irrigate with as directed daily Apply to gauze and let soak on wound for 5 minutes prior to dressing changes., Disp: 500 mL, Rfl: 3     amLODIPine (NORVASC) 5 MG tablet, Take by mouth every 24 hours, Disp: , Rfl:      aspirin (ASA) 81 MG EC tablet, Take by mouth every 24 hours, Disp: , Rfl:      atorvastatin (LIPITOR) 40 MG tablet, Take 1 tablet (40 mg) by mouth every 24 hours, Disp: 90 tablet, Rfl: 3     blood glucose (NO BRAND SPECIFIED) lancets standard, Use to test blood sugar 2 times daily or as directed., Disp: 100 lancet, Rfl: 4     blood glucose (NO BRAND SPECIFIED) test strip, Use to test blood sugar 2 times daily or as directed., Disp: 100 strip, Rfl: 4     blood glucose monitoring (NO BRAND SPECIFIED) meter device kit, Use to " test blood sugar 2 times daily or as directed., Disp: 1 kit, Rfl: 0     colchicine (COLCYRS) 0.6 MG tablet, Take 1 tablet (0.6 mg) by mouth daily, Disp: 90 tablet, Rfl: 1     Ferrous Gluconate 240 (27 Fe) MG TABS, Take 1 tablet by mouth daily , Disp: , Rfl:      finasteride (PROSCAR) 5 MG tablet, Take by mouth every 24 hours, Disp: , Rfl:      fluticasone (FLONASE) 50 MCG/ACT nasal spray, Spray 1-2 sprays in nostril every 24 hours, Disp: , Rfl:      isosorbide mononitrate (IMDUR) 30 MG 24 hr tablet, Take 1 tablet (30 mg) by mouth daily, Disp: 90 tablet, Rfl: 3     labetalol (NORMODYNE) 200 MG tablet, TAKE 1 TABLET BY MOUTH TWICE A DAY, Disp: 180 tablet, Rfl: 2     Lidocaine (LIDOCARE) 4 % Patch, Place 1 patch onto the skin every 24 hours To prevent lidocaine toxicity, patient should be patch free for 12 hrs daily. (Patient taking differently: Place 1 patch onto the skin every 24 hours To prevent lidocaine toxicity, patient should be patch free for 12 hrs daily.  To back every am), Disp: , Rfl:      lisinopril (ZESTRIL) 10 MG tablet, Take 1 tablet (10 mg) by mouth every 24 hours, Disp: 90 tablet, Rfl: 3     loratadine (CLARITIN) 10 MG tablet, Take by mouth every 24 hours, Disp: , Rfl:      metFORMIN (GLUCOPHAGE) 1000 MG tablet, Take 1 tablet (1,000 mg) by mouth 2 times daily (with meals), Disp: 180 tablet, Rfl: 0     multivitamin w/minerals (THERA-VIT-M) tablet, Take 1 tablet by mouth daily, Disp: , Rfl:      Semaglutide (RYBELSUS) 7 MG TABS, Take 7 mg by mouth daily, Disp: 90 tablet, Rfl: 3     tamsulosin (FLOMAX) 0.4 MG 24 hr capsule, Take 1 capsule by mouth daily., Disp: , Rfl:      tiZANidine (ZANAFLEX) 2 MG tablet, Take 4 mg by mouth At Bedtime, Disp: , Rfl:      torsemide (DEMADEX) 20 MG tablet, Take 1 tablet (20 mg) by mouth daily, Disp: 90 tablet, Rfl: 2    Review of Systems - 10 point Review of Systems is negative except for right hallux ulcer which is noted in HPI.      OBJECTIVE:  /68   Pulse 88    Temp 98.2  F (36.8  C)   Resp 12   Wt 225 lb (102.1 kg)   SpO2 96%   BMI 30.52 kg/m    General appearance: Patient is alert and fully cooperative with history & exam.  No sign of distress is noted during the visit.    Vascular: Dorsalis pedis palpableRight.  Dermatologic:    Wound 10/14/18 Foot Abrasion(s) GRAYISH DISCOLORATION (Active)       Wound 12/07/18 Left;Lower Foot Amputation blackened area third left toe amputation incision site (Active)       Wound 07/27/20 Left Toe (Comment  which one) Ulceration Left foot pinky toe (Active)       Wound 07/27/20 Right Toe (Comment  which one) Ulceration Right great toe wound  (Active)       Wound Toe (Comment  which one) Ulceration (Active)       Wound Foot Ulceration (Active)       Wound Foot Ulceration (Active)       Rash 10/21/18 1543 Bilateral other (see comments) (Active)       Rash 07/27/20 2015 Left lower leg other (see comments) (Active)       VASC Wound right hallux (Active)   Pre Size Length 0.4 10/27/22 1200   Pre Size Width 0.9 10/27/22 1200   Pre Size Depth 0.2 10/27/22 1200   Pre Total Sq cm 0.36 10/27/22 1200   Post Size Length 1.2 10/27/22 1200   Post Size Width 0.5 10/27/22 1200   Post Size Depth 0.2 10/27/22 1200   Post Total Sq cm 0.6 10/27/22 1200   Undermined 0.4 @ 8o'clock 10/06/22 1300   Description calloused 10/27/22 1200       Incision/Surgical Site 10/15/18 Left Foot (Active)       Incision/Surgical Site 12/07/18 Right Knee (Active)   Granular base right hallux ulcer with minimal green discoloration at peripheral wound, no erythema.  Neurologic: Diminished to light touch Right.  Musculoskeletal: Contracted digits noted Right.    Imaging:     No results found.       Picture:

## 2022-10-31 ENCOUNTER — HOSPITAL ENCOUNTER (OUTPATIENT)
Dept: PHYSICAL THERAPY | Facility: REHABILITATION | Age: 77
Discharge: HOME | End: 2022-10-31
Payer: COMMERCIAL

## 2022-10-31 ENCOUNTER — MYC MEDICAL ADVICE (OUTPATIENT)
Dept: FAMILY MEDICINE | Facility: CLINIC | Age: 77
End: 2022-10-31

## 2022-10-31 DIAGNOSIS — I89.0 LYMPHEDEMA: ICD-10-CM

## 2022-10-31 DIAGNOSIS — E11.51 TYPE 2 DIABETES MELLITUS WITH PERIPHERAL VASCULAR DISEASE (H): ICD-10-CM

## 2022-10-31 DIAGNOSIS — E11.9 TYPE 2 DIABETES, HBA1C GOAL < 7% (H): ICD-10-CM

## 2022-10-31 PROCEDURE — 97140 MANUAL THERAPY 1/> REGIONS: CPT | Mod: GP | Performed by: PHYSICAL THERAPIST

## 2022-11-01 RX ORDER — ORAL SEMAGLUTIDE 7 MG/1
7 TABLET ORAL DAILY
Qty: 90 TABLET | Refills: 3 | Status: SHIPPED | OUTPATIENT
Start: 2022-11-01 | End: 2023-11-20

## 2022-11-01 NOTE — TELEPHONE ENCOUNTER
Routing to PCP for refills.    Last Written Prescription Date:  01/31/2022  Last Fill Quantity: 100,  # refills: 4  Last office visit provider:  09/2022    Requested Prescriptions   Pending Prescriptions Disp Refills     blood glucose (NO BRAND SPECIFIED) lancets standard 200 lancet 3     Sig: Use to test blood sugar 2 times daily or as directed.       There is no refill protocol information for this order        blood glucose (NO BRAND SPECIFIED) test strip 200 strip 3     Sig: Use to test blood sugar 2 times daily or as directed.       There is no refill protocol information for this order        Semaglutide (RYBELSUS) 7 MG TABS 90 tablet 3     Sig: Take 7 mg by mouth daily       There is no refill protocol information for this order          Chari Silver RN 11/01/22 8:31 AM

## 2022-11-03 DIAGNOSIS — E11.51 TYPE 2 DIABETES MELLITUS WITH PERIPHERAL VASCULAR DISEASE (H): ICD-10-CM

## 2022-11-03 NOTE — TELEPHONE ENCOUNTER
Pt is requesting script be rewritten for 100 test strips/90 day supply and sig be changed to testing once a day. States insurance won't pay for more than that.

## 2022-11-03 NOTE — TELEPHONE ENCOUNTER
Pending Prescriptions:                       Disp   Refills    blood glucose (NO BRAND SPECIFIED) test s*200 st*3            Sig: Use to test blood sugar 2 times daily or as           directed.    Pt needs [employer] Contour Next test strips.

## 2022-11-04 ENCOUNTER — OFFICE VISIT (OUTPATIENT)
Dept: PODIATRY | Facility: CLINIC | Age: 77
End: 2022-11-04
Payer: COMMERCIAL

## 2022-11-04 VITALS — SYSTOLIC BLOOD PRESSURE: 124 MMHG | DIASTOLIC BLOOD PRESSURE: 68 MMHG | BODY MASS INDEX: 30.52 KG/M2 | WEIGHT: 225 LBS

## 2022-11-04 DIAGNOSIS — L60.3 DYSTROPHIC NAIL: ICD-10-CM

## 2022-11-04 DIAGNOSIS — E11.42 DIABETIC POLYNEUROPATHY ASSOCIATED WITH TYPE 2 DIABETES MELLITUS (H): Primary | ICD-10-CM

## 2022-11-04 DIAGNOSIS — I73.9 PVD (PERIPHERAL VASCULAR DISEASE) (H): ICD-10-CM

## 2022-11-04 DIAGNOSIS — M79.672 LEFT FOOT PAIN: ICD-10-CM

## 2022-11-04 PROCEDURE — 11750 EXCISION NAIL&NAIL MATRIX: CPT | Mod: T4 | Performed by: PODIATRIST

## 2022-11-04 PROCEDURE — 99213 OFFICE O/P EST LOW 20 MIN: CPT | Mod: 25 | Performed by: PODIATRIST

## 2022-11-04 RX ORDER — CLINDAMYCIN HCL 300 MG
300 CAPSULE ORAL 3 TIMES DAILY
Qty: 30 CAPSULE | Refills: 0 | Status: SHIPPED | OUTPATIENT
Start: 2022-11-04 | End: 2022-11-14

## 2022-11-04 RX ORDER — MUPIROCIN 20 MG/G
OINTMENT TOPICAL 2 TIMES DAILY
Qty: 30 G | Refills: 1 | Status: SHIPPED | OUTPATIENT
Start: 2022-11-04 | End: 2022-12-15

## 2022-11-04 NOTE — PROGRESS NOTES
Podiatry / Foot and Ankle Surgery Progress Note    November 4, 2022    Subject: Patient was seen for painful left 5th toenail.  Notes its been sore for years.  Able to cut into his fourth toe.  He is here to have the nail removed.  Pain currently is 4 out of 10.  Worse with pressure.    Objective:  Vitals: /68   Wt 102.1 kg (225 lb)   BMI 30.52 kg/m    BMI= Body mass index is 30.52 kg/m .    A1C: 7.2 (9/9/2022)    General:  Patient is alert and orientated.  NAD.    Vascular:  DP and PT pulses are palpable.  No edema or varicosities noted.  CFT's < 3secs.  Skin temp is normal.    Neuro:  Light and gross touch sensation intact to digits, dorsum, and plantar aspects of the feet.    Derm:  Left foot- full-thickness ulceration to the distal aspect of the second toe is healed. No open lesions or signs of infection. Let 5th toenail is thickened and curved around end of toe. minimal pain on palpation. .    Musculoskeletal:  Decrease arch height. Lateral deviation of the right and left halluxes.  Significant decreased range of motion of the right first metatarsal phalangeal joint and inner phalangeal joint.  Previous left third toe amputation.     Assessment:    Diabetic polyneuropathy associated with type 2 diabetes mellitus (H)  Dystrophic nail  Left foot pain  PVD (peripheral vascular disease) (H)    Medical Decision Making/Plan:  The potential causes and nature of an ingrown toenail were discussed with the patient.  We reviewed the natural history/prognosis of the condition and potential risks if no treatment is provided.      Treatment options discussed included conservative management (oral antibiotics, soaking of foot, adequate width shoes)  as well as surgical management (partial or total nail removal).  The pros and cons of both forms of treatment were reviewed.      After thorough discussion and answering all questions, the patient elected to a bandage.  He was given oral antibiotic prophylactically to help  to try to prevent infection.  All questions were answered to patient's satisfaction and he will call for questions or concerns..     Procedure: After verbal consent, the left 5th toe was anesthetized with 5cc's of 1% lidocaine plain. A tourniquet was applied to the toe. The entire nail was released from the nail bed with an elevator and removed completely with a hemostat.  Three 30 second applications of phenol were applied to the nail bed and nail matrix.  The area was then flushed with copious amounts of alcohol.  Bacitracin was applied to the nail bed.  The tourniquet was removed.  Bandage was applied to the toe.  The patient tolerated the procedure and anesthesia well.      Patient Risk Factor:  Patient is a medium risk factor for infection.     Ayaka Azevedo DPM, Podiatry/Foot and Ankle Surgery

## 2022-11-04 NOTE — LETTER
11/4/2022         RE: Lance Ibrahim  [address] [place] [id number]        Dear Colleague,    Thank you for referring your patient, Lance Ibrahim, to the [facility]. Please see a copy of my visit note below.    Podiatry / Foot and Ankle Surgery Progress Note    November 4, 2022    Subject: Patient was seen for painful left 5th toenail.  Notes its been sore for years.  Able to cut into his fourth toe.  He is here to have the nail removed.  Pain currently is 4 out of 10.  Worse with pressure.    Objective:  Vitals: /68   Wt 102.1 kg (225 lb)   BMI 30.52 kg/m    BMI= Body mass index is 30.52 kg/m .    A1C: 7.2 (9/9/2022)    General:  Patient is alert and orientated.  NAD.    Vascular:  DP and PT pulses are palpable.  No edema or varicosities noted.  CFT's < 3secs.  Skin temp is normal.    Neuro:  Light and gross touch sensation intact to digits, dorsum, and plantar aspects of the feet.    Derm:  Left foot- full-thickness ulceration to the distal aspect of the second toe is healed. No open lesions or signs of infection. Let 5th toenail is thickened and curved around end of toe. minimal pain on palpation. .    Musculoskeletal:  Decrease arch height. Lateral deviation of the right and left halluxes.  Significant decreased range of motion of the right first metatarsal phalangeal joint and inner phalangeal joint.  Previous left third toe amputation.     Assessment:    Diabetic polyneuropathy associated with type 2 diabetes mellitus (H)  Dystrophic nail  Left foot pain  PVD (peripheral vascular disease) (H)    Medical Decision Making/Plan:  The potential causes and nature of an ingrown toenail were discussed with the patient.  We reviewed the natural history/prognosis of the condition and potential risks if no treatment is provided.      Treatment options discussed included conservative management (oral antibiotics, soaking of foot, adequate width shoes)  as well as surgical  management (partial or total nail removal).  The pros and cons of both forms of treatment were reviewed.      After thorough discussion and answering all questions, the patient elected to a bandage.  He was given oral antibiotic prophylactically to help to try to prevent infection.  All questions were answered to patient's satisfaction and he will call for questions or concerns..     Procedure: After verbal consent, the left 5th toe was anesthetized with 5cc's of 1% lidocaine plain. A tourniquet was applied to the toe. The entire nail was released from the nail bed with an elevator and removed completely with a hemostat.  Three 30 second applications of phenol were applied to the nail bed and nail matrix.  The area was then flushed with copious amounts of alcohol.  Bacitracin was applied to the nail bed.  The tourniquet was removed.  Bandage was applied to the toe.  The patient tolerated the procedure and anesthesia well.      Patient Risk Factor:  Patient is a medium risk factor for infection.     Ayaka Azevedo DPM, Podiatry/Foot and Ankle Surgery        Again, thank you for allowing me to participate in the care of your patient.        Sincerely,        Ayaka Azevedo DPM, Podiatry/Foot and Ankle Surgery

## 2022-11-04 NOTE — TELEPHONE ENCOUNTER
Pt called to express frustration that the test strips sent over were incorrect again. OneTouch test strips are no longer covered.     **Please only send in script for [employer] Contour Next test strips.**

## 2022-11-04 NOTE — PATIENT INSTRUCTIONS
Thank you for choosing [facility] Podiatry / Foot & Ankle Surgery!    DR RANDHAWA'S CLINIC:  [facility]   [id number]   [place] [address]      TRIAGE LINE: [phone]  APPOINTMENTS: [phone]  RADIOLOGY: [phone]  SET UP SURGERY: [phone]  FAX NUMBER: [phone]  BILLING QUESTIONS: [phone]       Follow up: As needed      INGROWN TOENAILS  When a toenail is ingrown, it is curved and grows into the skin, usually at the nail borders (the sides of the nail). This  digging in  of the nail irritates the skin, often creating pain, redness, swelling, and warmth in the toe.  If an ingrown nail causes a break in the skin, bacteria may enter and cause an infection in the area, which is often marked by drainage and a foul odor. However, even if the toe isn t painful, red, swollen, or warm, a nail that curves downward into the skin can progress to an infection.  CAUSES:  Heredity: In many people, the tendency for ingrown toenails is inherited.   Trauma: Sometimes an ingrown toenail is the result of trauma, such as stubbing your toe, having an object fall on your toe, or engaging in activities that involve repeated pressure on the toes, such as kicking or running.   Improper Trimming:  The most common cause of ingrown toenails is cutting your nails too short. This encourages the skin next to the nail to fold over the nail.   Improperly Sized Footwear: Ingrown toenails can result from wearing socks and shoes that are tight or short.   Nail Conditions: Ingrown toenails can be caused by nail problems, such as fungal infections or losing a nail due to trauma.   TREATMENT: Sometimes initial treatment for ingrown toenails can be safely performed at home. However, home treatment is strongly discouraged if an infection is suspected, or for those who have medical conditions that put feet at high risk, such as diabetes, nerve damage in the foot, or poor circulation.  Home care: If you  don t have an infection or any of the above medical conditions, you can soak your foot in room-temperature water (adding Epsom s salt may be recommended by your doctor), and gently massage the side of the nail fold to help reduce the inflammation.  Avoid attempting  bathroom surgery.  Repeated cutting of the nail can cause the condition to worsen over time. If your symptoms fail to improve, it s time to see a foot and ankle surgeon.  Physician care: After examining the toe, the foot and ankle surgeon will select the treatment best suited for you. If an infection is present, an oral antibiotic may be prescribed.  Sometimes a minor surgical procedure, often performed in the office, will ease the pain and remove the offending nail. After applying a local anesthetic, the doctor removes part of the nail s side border. Some nails may become ingrown again, requiring removal of the nail root.  Following the nail procedure, a light bandage will be applied. Most people experience very little pain after surgery and may resume normal activity the next day. If your surgeon has prescribed an oral antibiotic, be sure to take all the medication, even if your symptoms have improved.  PREVENTION:  Proper Trimming: Cut toenails in a fairly straight line, and don t cut them too short. You should be able to get your fingernail under the sides and end of the nail.   Well-fitting Footwear: Don t wear shoes that are short or tight in the toe area. Avoid shoes that are loose, because they too cause pressure on the toes, especially when running or walking briskly.     INGROWN TOENAIL REMOVAL AFTERCARE   Go directly home and elevate the affected foot on one or two pillows for the remainder of the day/evening if possible. Your toe may stay numb anywhere from 2-8 hours.   Take Tylenol, ibuprofen or another anti-inflammatory as needed for pain.   Take antibiotic if that has been prescribed. Finish the entire prescribed antibiotic even if your  symptoms have improved.   The evening of the procedure, soak/wash the affected area in warm water (you may add Epsom salt) for 5 to 10 minutes. Do this twice a day for 2-4 weeks (6-8 weeks if you had phenol) (you may count showering/bathing as one soak).  After soaks, pat the area dry and then allow to airdry for a few minutes. Apply antibiotic ointment to the area and cover with 2 X 2 gauze and paper tape or band-aid.  You may pursue everyday activities as tolerated with either an open toe shoe or cut-out shoe as needed or you may wear regular shoes if no pain is noted.  Watch for any signs and symptoms of infection such as: redness, red streaks going up the foot/leg, swelling, pus or foul odor. Those that have had the phenol procedure, the toe will drain longer and will look like it is infected because it is a chemical burn.   Please call with questions.

## 2022-11-09 ENCOUNTER — MYC MEDICAL ADVICE (OUTPATIENT)
Dept: FAMILY MEDICINE | Facility: CLINIC | Age: 77
End: 2022-11-09

## 2022-11-09 DIAGNOSIS — L30.9 DERMATITIS: Primary | ICD-10-CM

## 2022-11-09 RX ORDER — CLOTRIMAZOLE AND BETAMETHASONE DIPROPIONATE 10; .64 MG/G; MG/G
CREAM TOPICAL 2 TIMES DAILY
Qty: 50 G | Refills: 1 | Status: SHIPPED | OUTPATIENT
Start: 2022-11-09 | End: 2023-01-05

## 2022-11-10 ENCOUNTER — HOSPITAL ENCOUNTER (OUTPATIENT)
Dept: PHYSICAL THERAPY | Facility: REHABILITATION | Age: 77
Discharge: HOME | End: 2022-11-10
Attending: PODIATRIST
Payer: COMMERCIAL

## 2022-11-10 DIAGNOSIS — I87.8 VENOUS STASIS: Primary | ICD-10-CM

## 2022-11-10 DIAGNOSIS — I89.0 LYMPHEDEMA: ICD-10-CM

## 2022-11-10 PROCEDURE — 97140 MANUAL THERAPY 1/> REGIONS: CPT | Mod: GP | Performed by: PHYSICAL THERAPIST

## 2022-11-10 NOTE — TELEPHONE ENCOUNTER
Responded to patient via [employer] to let him know that his prescription was filled and that it was sent to [facility] on [address]. Evelia Gan RN on 11/10/2022 at 8:58 AM

## 2022-11-16 ENCOUNTER — HOSPITAL ENCOUNTER (OUTPATIENT)
Dept: PHYSICAL THERAPY | Facility: REHABILITATION | Age: 77
Discharge: HOME | End: 2022-11-16
Payer: COMMERCIAL

## 2022-11-16 DIAGNOSIS — I87.8 VENOUS STASIS: Primary | ICD-10-CM

## 2022-11-16 DIAGNOSIS — I89.0 LYMPHEDEMA: ICD-10-CM

## 2022-11-16 PROCEDURE — 97140 MANUAL THERAPY 1/> REGIONS: CPT | Mod: GP | Performed by: PHYSICAL THERAPIST

## 2022-11-17 ENCOUNTER — OFFICE VISIT (OUTPATIENT)
Dept: VASCULAR SURGERY | Facility: CLINIC | Age: 77
End: 2022-11-17
Attending: PODIATRIST
Payer: COMMERCIAL

## 2022-11-17 VITALS
HEART RATE: 96 BPM | SYSTOLIC BLOOD PRESSURE: 108 MMHG | TEMPERATURE: 98.4 F | DIASTOLIC BLOOD PRESSURE: 64 MMHG | RESPIRATION RATE: 16 BRPM

## 2022-11-17 DIAGNOSIS — E11.621 DIABETIC ULCER OF TOE OF RIGHT FOOT ASSOCIATED WITH TYPE 2 DIABETES MELLITUS, UNSPECIFIED ULCER STAGE (H): Primary | ICD-10-CM

## 2022-11-17 DIAGNOSIS — L97.519 DIABETIC ULCER OF TOE OF RIGHT FOOT ASSOCIATED WITH TYPE 2 DIABETES MELLITUS, UNSPECIFIED ULCER STAGE (H): Primary | ICD-10-CM

## 2022-11-17 PROCEDURE — 11042 DBRDMT SUBQ TIS 1ST 20SQCM/<: CPT | Performed by: PODIATRIST

## 2022-11-17 ASSESSMENT — PAIN SCALES - GENERAL: PAINLEVEL: NO PAIN (0)

## 2022-11-17 NOTE — PATIENT INSTRUCTIONS
Important lnstructions        WEIGHT BEARING STATUS: You are to remain NON WEIGHT BEARING on your right foot. NON WEIGHT BEARING MEANS NO PRESSURE ON YOUR FOOT OR HEEL AT ANY TIME FOR ANY REASON!    2. OFFLOADING DEVICE: Must use a A WHEELCHAIR at all times! (do not use affected foot to push wheelchair)      4. ELEVATE: Elevating your leg means laying with your head on a pillow and your foot ABOVE YOUR WAIST.     5. DO NOT MOVE YOUR FOOT.  There is a risk of worsening the wound or incision. To give yourself a higher chance of healing, please DO NOT swing foot back and forth and wiggle foot/toes especially when inside a stabilization device.        Dressing Change lnstructions            If you see green drainage from the wound- use the acetic acid. Otherwise no need to use.       - Dressing Application of  Endoform    1. Endoform should be cut to the size of the wound.  It should touch the edges of the ulcer. It is okay if it overlap the edges a small amount.  Then, moisten the Endoform with saline.(If it is easier for you, you can moisten it before laying it in the wound)    2. Cover the wound with Endoform, followed by dry gauze, and secure with roll gauze if needed.     3. Endoform is naturally used by the body over time so you may not find it in the wound when you change your dressing.  If you do find some, gently cleanse the wound with saline. Do not remove the remaining Endoform, which may appear as an off-white to diana gel, just add Endoform on top.  Usually, more Endoform will need to be added every 5-7 days.     4. Change your top dressing every 1-2 days or as needed depending on drainage.    -Endoform is a collagen dressing created from ovine (sheep) fore-stomach tissue. The collagen extracellular matrix transforms into a soft conforming sheet, which is naturally incorporated into the wound over time.  Collagen dressings are used to stimulate wound healing.   ,       It IS NOT ok to get your wound wet in  the bath or shower    SEEK MEDICAL CARE IF:  You have an increase in swelling, pain, or redness around the wound.  You have an increase in the amount of pus coming from the wound.  There is a bad smell coming from the wound.  The wound appears to be worsening/enlarging  You have a fever greater than 101.5 F      It is ok to continue current wound care treatment/products for the next 2-3 days until new wound care supplies are ordered and arrive. If longer than this please contact our office at [phone].        We want to hear from you!   In the next few weeks, you should receive a call or email to complete a survey about your visit at [facility]. Please help us improve your appointment experience by letting us know how we did today. We strive to make your experience good and value any ways in which we could do better.      We value your input and suggestions.    Thank you for choosing the [facility]!

## 2022-11-17 NOTE — PROGRESS NOTES
FOOT AND ANKLE SURGERY/PODIATRY Progress Note      ASSESSMENT:   Diabetic Ulceration right hallux  Venous Stasis       TREATMENT:  -The right hallux ulcer is measuring larger today. I recommend he continue with endoform and non-weight bearing with knee walker.     -After discussion of risk factors and consent obtained 2% Lidocaine HCL jelly was applied, under clean conditions, the right and foot ulceration(s) were debrided using #15 blade scalpel.  Devitalized and nonviable tissue, along with any fibrin and slough, was removed to improve granulation tissue formation, stimulate wound healing, decrease overall bacteria load, disrupt biofilm formation and decrease edge senescence. Wound drainage was scant No. Total excisional debridement was 0.99 sq cm into the subcutaneous tissue with a depth of 0.2 cm.   Ulcers were improved afterwards and .  Measures were as noted on the flow sheet. Collagen with a gauze dresssing was applied. He will continue to apply Collagen with a gauze dresssing as directed.     -He will follow-up in 3 weeks.    Nakul Salazar DPM  [facility]      HPI: Lance Ibrahim was seen again today for a sore on the right hallux. He has remained mostly non-weight bearing with the knee walker but admits walking more than directed.     Past Medical History:   Diagnosis Date     Arthritis     osteoarthritis knees     BPH      CAD (coronary artery disease)     subtotal occlusion in the small distal LAD      Cardiomyopathy      Cerebral artery occlusion with cerebral infarction     TIA 1993, no residual     Chronic kidney disease      Chronic rhinitis      HTN (hypertension)      Hyperlipidemia      Kidney stone      PVD (peripheral vascular disease)      Sleep apnea     doesn't use cpap     TIA (transient ischaemic attack) 1993     Type 2 diabetes mellitus - 11/08/2018     Ureteral stone        Past Surgical History:   Procedure Laterality Date     AMPUTATE TOE(S) Left 10/15/2018  "   Procedure: AMPUTATE TOE(S);  Left third toe amputation;  Surgeon: Ayaka Azevedo DPM, Podiatry/Foot and Ankle Surgery;  Location: [facility] OR     ARTHROPLASTY KNEE Right 12/07/2018    Procedure: Right total knee arthroplasty;  Surgeon: Issa Cunha MD;  Location: [facility] OR     COLONOSCOPY  03/09/2013    Procedure: COLONOSCOPY;  COLONOSCOPY;  Surgeon: Chau Hogan MD;  Location:  GI     CV HEART CATHETERIZATION WITH POSSIBLE INTERVENTION Left 03/05/2019    Procedure: Coronary Angiogram;  Surgeon: Nas Linda MD;  Location:  HEART CARDIAC CATH LAB     Diastasis recti repair  1985     EXTRACAPSULAR CATARACT EXTRATION WITH INTRAOCULAR LENS IMPLANT Left 03/13/2017     EXTRACAPSULAR CATARACT EXTRATION WITH INTRAOCULAR LENS IMPLANT Right      FOOT SURGERY  04/2013    cyst removal      HERNIA REPAIR  07/13/2004    ventral      ORIF Shoulder Left      Ventral hernia repair NOS  1987       Allergies   Allergen Reactions     Penicillins Anaphylaxis     8/25/22 - tolerated cefepime      Sulfa Drugs      \"itchy rash, swelling of face and hands\"     Ancef [Cefazolin] Rash         Current Outpatient Medications:      amLODIPine (NORVASC) 5 MG tablet, Take by mouth every 24 hours, Disp: , Rfl:      aspirin (ASA) 81 MG EC tablet, Take by mouth every 24 hours, Disp: , Rfl:      atorvastatin (LIPITOR) 40 MG tablet, Take 1 tablet (40 mg) by mouth every 24 hours, Disp: 90 tablet, Rfl: 3     blood glucose (NO BRAND SPECIFIED) lancets standard, Use to test blood sugar 2 times daily or as directed., Disp: 200 lancet, Rfl: 3     blood glucose (NO BRAND SPECIFIED) test strip, Use to test blood sugar 2 times daily or as directed., Disp: 200 strip, Rfl: 3     blood glucose monitoring (NO BRAND SPECIFIED) meter device kit, Use to test blood sugar 2 times daily or as directed., Disp: 1 kit, Rfl: 0     clotrimazole-betamethasone (LOTRISONE) 1-0.05 % external cream, Apply topically 2 times daily, Disp: 50 g, Rfl: 1     colchicine " (COLCYRS) 0.6 MG tablet, Take 1 tablet (0.6 mg) by mouth daily, Disp: 90 tablet, Rfl: 1     Ferrous Gluconate 240 (27 Fe) MG TABS, Take 1 tablet by mouth daily , Disp: , Rfl:      finasteride (PROSCAR) 5 MG tablet, Take by mouth every 24 hours, Disp: , Rfl:      fluticasone (FLONASE) 50 MCG/ACT nasal spray, Spray 1-2 sprays in nostril every 24 hours, Disp: , Rfl:      isosorbide mononitrate (IMDUR) 30 MG 24 hr tablet, Take 1 tablet (30 mg) by mouth daily, Disp: 90 tablet, Rfl: 3     labetalol (NORMODYNE) 200 MG tablet, TAKE 1 TABLET BY MOUTH TWICE A DAY, Disp: 180 tablet, Rfl: 2     Lidocaine (LIDOCARE) 4 % Patch, Place 1 patch onto the skin every 24 hours To prevent lidocaine toxicity, patient should be patch free for 12 hrs daily. (Patient taking differently: Place 1 patch onto the skin every 24 hours To prevent lidocaine toxicity, patient should be patch free for 12 hrs daily.  To back every am), Disp: , Rfl:      lisinopril (ZESTRIL) 10 MG tablet, Take 1 tablet (10 mg) by mouth every 24 hours, Disp: 90 tablet, Rfl: 3     loratadine (CLARITIN) 10 MG tablet, Take by mouth every 24 hours, Disp: , Rfl:      metFORMIN (GLUCOPHAGE) 1000 MG tablet, Take 1 tablet (1,000 mg) by mouth 2 times daily (with meals), Disp: 180 tablet, Rfl: 0     multivitamin w/minerals (THERA-VIT-M) tablet, Take 1 tablet by mouth daily, Disp: , Rfl:      mupirocin (BACTROBAN) 2 % external ointment, Apply topically 2 times daily, Disp: 30 g, Rfl: 1     Semaglutide (RYBELSUS) 7 MG TABS, Take 7 mg by mouth daily, Disp: 90 tablet, Rfl: 3     tamsulosin (FLOMAX) 0.4 MG 24 hr capsule, Take 1 capsule by mouth daily., Disp: , Rfl:      tiZANidine (ZANAFLEX) 2 MG tablet, Take 4 mg by mouth At Bedtime, Disp: , Rfl:      torsemide (DEMADEX) 20 MG tablet, Take 1 tablet (20 mg) by mouth daily, Disp: 90 tablet, Rfl: 2     acetic acid 0.25 % irrigation, Irrigate with as directed daily Apply to gauze and let soak on wound for 5 minutes prior to dressing  changes. (Patient not taking: Reported on 11/17/2022), Disp: 500 mL, Rfl: 3    Review of Systems - 10 point Review of Systems is negative except for right hallux ulcer which is noted in HPI.      OBJECTIVE:  /64   Pulse 96   Temp 98.4  F (36.9  C) (Oral)   Resp 16   General appearance: Patient is alert and fully cooperative with history & exam.  No sign of distress is noted during the visit.    Vascular: Dorsalis pedis palpableRight.  Dermatologic:    Wound 10/14/18 Foot Abrasion(s) GRAYISH DISCOLORATION (Active)       Wound 12/07/18 Left;Lower Foot Amputation blackened area third left toe amputation incision site (Active)       Wound 07/27/20 Left Toe (Comment  which one) Ulceration Left foot pinky toe (Active)       Wound 07/27/20 Right Toe (Comment  which one) Ulceration Right great toe wound  (Active)       Wound Toe (Comment  which one) Ulceration (Active)       Wound Foot Ulceration (Active)       Wound Foot Ulceration (Active)       Rash 10/21/18 1543 Bilateral other (see comments) (Active)       Rash 07/27/20 2015 Left lower leg other (see comments) (Active)       VASC Wound right hallux (Active)   Pre Size Length 0.4 10/27/22 1200   Pre Size Width 0.9 10/27/22 1200   Pre Size Depth 0.2 10/27/22 1200   Pre Total Sq cm 0.36 10/27/22 1200   Post Size Length 1.2 10/27/22 1200   Post Size Width 0.5 10/27/22 1200   Post Size Depth 0.2 10/27/22 1200   Post Total Sq cm 0.6 10/27/22 1200   Undermined 0.4 @ 8o'clock 10/06/22 1300   Description calloused 10/27/22 1200       Incision/Surgical Site 10/15/18 Left Foot (Active)       Incision/Surgical Site 12/07/18 Right Knee (Active)   Granular base right hallux ulcer with minimal green discoloration at peripheral wound, no erythema.  Neurologic: Diminished to light touch Right.  Musculoskeletal: Contracted digits noted Right.    Imaging:     No results found.       Picture:

## 2022-11-18 ASSESSMENT — PATIENT HEALTH QUESTIONNAIRE - PHQ9
SUM OF ALL RESPONSES TO PHQ QUESTIONS 1-9: 8
10. IF YOU CHECKED OFF ANY PROBLEMS, HOW DIFFICULT HAVE THESE PROBLEMS MADE IT FOR YOU TO DO YOUR WORK, TAKE CARE OF THINGS AT HOME, OR GET ALONG WITH OTHER PEOPLE: NOT DIFFICULT AT ALL
SUM OF ALL RESPONSES TO PHQ QUESTIONS 1-9: 8

## 2022-11-25 ENCOUNTER — OFFICE VISIT (OUTPATIENT)
Dept: FAMILY MEDICINE | Facility: CLINIC | Age: 77
End: 2022-11-25
Payer: COMMERCIAL

## 2022-11-25 VITALS
BODY MASS INDEX: 30.82 KG/M2 | OXYGEN SATURATION: 99 % | HEART RATE: 85 BPM | SYSTOLIC BLOOD PRESSURE: 110 MMHG | TEMPERATURE: 97.4 F | DIASTOLIC BLOOD PRESSURE: 60 MMHG | WEIGHT: 227.25 LBS

## 2022-11-25 DIAGNOSIS — E11.51 TYPE 2 DIABETES MELLITUS WITH PERIPHERAL VASCULAR DISEASE (H): Primary | ICD-10-CM

## 2022-11-25 DIAGNOSIS — Z79.899 ENCOUNTER FOR LONG-TERM (CURRENT) USE OF MEDICATIONS: ICD-10-CM

## 2022-11-25 DIAGNOSIS — B35.6 TINEA CRURIS: ICD-10-CM

## 2022-11-25 DIAGNOSIS — R14.0 ABDOMINAL BLOATING: ICD-10-CM

## 2022-11-25 PROCEDURE — 99214 OFFICE O/P EST MOD 30 MIN: CPT | Performed by: FAMILY MEDICINE

## 2022-11-25 ASSESSMENT — PAIN SCALES - GENERAL: PAINLEVEL: NO PAIN (0)

## 2022-11-25 NOTE — PROGRESS NOTES
Assessment & Plan     Encounter for long-term (current) use of medications  Patient is having issues with refills of his monitoring equipment and thanks to our medical assistants here we have straighten the problem out    Type 2 diabetes mellitus with peripheral vascular disease (H)  Use the following  -- blood glucose (NO BRAND SPECIFIED) test strip  Dispense: 200 strip; Refill: 3  - blood glucose (NO BRAND SPECIFIED) lancets standard  Dispense: 200 lancet; Refill: 3    Tinea cruris  Patient will discontinue the use of [place] soap and use Dove with Shea butter; excellent response from Lotrimin continue to use that for 10 more days and then as needed                   No follow-ups on file.    Rickey Adamson MD  [facility]   Lance is a 77 year old, presenting for the following health issues:  follow up for yeast infection      HPI Lance is having difficulty getting his diabetic supplies to monitor his blood sugar especially now that things are much better since he has been on Rybelsus.  We will straighten this out and prescribe her I have new monitoring equipment for him thanks to our staff.  His blood pressure is excellent he is also being seen here for follow-up of abdominal bloating which I feel is related to a change in gastrointestinal absorption secondary to Rybelsus delaying gastric emptying.  We suggested the use of probiotics and he will pick some of these up I think things will straighten out longer he remains on the Rybelsus.  He is also here to be followed on his tinea cruris which was moderately severe fortunately is got an excellent response to Lotrisone which she will continue to use for 10 more days he was reassured.  All medical questions asked were answered face-to-face time non-face-to-face time for all 4 issues was 30 minutes.          Review of Systems   Constitutional, HEENT, cardiovascular, pulmonary, gi and gu systems are negative, except as  otherwise noted.      Objective    /60   Pulse 85   Temp 97.4  F (36.3  C)   Wt 103.1 kg (227 lb 4 oz)   SpO2 99%   BMI 30.82 kg/m    Body mass index is 30.82 kg/m .  Physical Exam     General Appearance:  Alert, cooperative, no distress  Head:  Normocephalic, no obvious abnormality  Ears: TM anatomy normal  Eyes:  PERRL, EOM's intact, conjunctiva and corneas clear  Nose:  Nares symmetrical, septum midline, mucosa pink, no sinus tenderness  Throat:  Lips, tongue, and mucosa are moist, pink, and intact  Neck:  Supple, symmetrical, trachea midline, no adenopathy; thyroid: no enlargement, symmetric,no tenderness/mass/nodules; no carotid bruit, no JVD  Back:  Symmetrical, no curvature, ROM normal, no CVA tenderness  Chest/Breast:  No mass or tenderness  Lungs:  Clear to auscultation bilaterally, respirations unlabored   Heart:  Normal PMI, regular rate & rhythm, S1 and S2 normal, no murmurs, rubs, or gallops  Abdomen:  Soft, non-tender, bowel sounds active all four quadrants, no mass, or organomegaly  Musculoskeletal:  Tone and strength strong and symmetrical, all extremities  Lymphatic:  No adenopathy  Skin/Hair/Nails: Tinea cruris has improved 90%  Neurologic:  Alert and oriented x3, no cranial nerve deficits, normal strength and tone, gait steady patient is using a walker  Extremities:  No edema.  Narda's sign negative.    Genitourinary: deferred  Pulses:  Equal bilaterally

## 2022-11-28 ENCOUNTER — HOSPITAL ENCOUNTER (OUTPATIENT)
Dept: PHYSICAL THERAPY | Facility: REHABILITATION | Age: 77
Discharge: HOME | End: 2022-11-28
Payer: COMMERCIAL

## 2022-11-28 DIAGNOSIS — I89.0 LYMPHEDEMA: ICD-10-CM

## 2022-11-28 DIAGNOSIS — I87.8 VENOUS STASIS: Primary | ICD-10-CM

## 2022-11-28 PROCEDURE — 97140 MANUAL THERAPY 1/> REGIONS: CPT | Mod: GP | Performed by: PHYSICAL THERAPIST

## 2022-11-30 ENCOUNTER — OFFICE VISIT (OUTPATIENT)
Dept: PODIATRY | Facility: CLINIC | Age: 77
End: 2022-11-30
Payer: COMMERCIAL

## 2022-11-30 VITALS — BODY MASS INDEX: 30.79 KG/M2 | SYSTOLIC BLOOD PRESSURE: 118 MMHG | DIASTOLIC BLOOD PRESSURE: 62 MMHG | WEIGHT: 227 LBS

## 2022-11-30 DIAGNOSIS — E11.42 DIABETIC POLYNEUROPATHY ASSOCIATED WITH TYPE 2 DIABETES MELLITUS (H): Primary | ICD-10-CM

## 2022-11-30 DIAGNOSIS — M20.42 HAMMERTOE OF LEFT FOOT: ICD-10-CM

## 2022-11-30 DIAGNOSIS — I89.0 LYMPHEDEMA OF BOTH LOWER EXTREMITIES: ICD-10-CM

## 2022-11-30 DIAGNOSIS — M79.2 NEUROPATHIC PAIN: ICD-10-CM

## 2022-11-30 PROCEDURE — 99213 OFFICE O/P EST LOW 20 MIN: CPT | Performed by: PODIATRIST

## 2022-11-30 NOTE — PROGRESS NOTES
Podiatry / Foot and Ankle Surgery Progress Note    November 30, 2022    Subject: Patient was seen for pain to his left fifth toe.  Notes that its only at night that he notices it.  He wears the postop shoe and states he has no pain.  He presses on it and has no pain.  States it will be a sharp shooting pain intermittently at night.  He has been taking pain medication that he has had from previous surgeries to try to help with sleeping.  Denies injury.    Objective:  Vitals: /62   Wt 103 kg (227 lb)   BMI 30.79 kg/m    BMI= Body mass index is 30.79 kg/m .    A1C: 7.2 (9/9/2022)     General:  Patient is alert and orientated.  NAD.     Vascular:  DP and PT pulses are palpable.  No edema or varicosities noted.  CFT's < 3secs.  Skin temp is normal.     Neuro:  Light and gross touch sensation intact to digits, dorsum, and plantar aspects of the feet.     Derm:  no open lesions to left foot. No pain on palpation of left 5th toe. No redness or signs of acute infection.      Musculoskeletal:  Decrease arch height. Lateral deviation of the right and left halluxes.  Significant decreased range of motion of the right first metatarsal phalangeal joint and inner phalangeal joint.  Previous left third toe amputation.     Assessment:    Diabetic polyneuropathy associated with type 2 diabetes mellitus (H)  Neuropathic pain  Hammertoe of left foot  Lymphedema of both lower extremities      Medical Decision Making/Plan: Clinically there are no signs of infection.  There are no open lesions.  The nail has not regrown.  No pain on palpation on exam.  Discussed that I feel that this may be more neuropathy pain especially as it occurs at night and not when he is on his foot.  Recommend following up with primary care doctor to get nerve medication to see if that will help.  Also discussed possible physical therapy with transventricular nerve stimulation.  He is going to continue to use over-the-counter lidocaine patches to the area  at this time to help with pain.  All questions were answered to patient's satisfaction and he will call for any questions or concerns.      Patient Risk Factor:  Patient is a medium risk factor for infection.     Ayaka Azevedo DPM, Podiatry/Foot and Ankle Surgery

## 2022-11-30 NOTE — LETTER
11/30/2022         RE: Lance Ibrahim  [address] [place] [id number]        Dear Colleague,    Thank you for referring your patient, Lance Ibrahim, to the [facility]. Please see a copy of my visit note below.    Podiatry / Foot and Ankle Surgery Progress Note    November 30, 2022    Subject: Patient was seen for pain to his left fifth toe.  Notes that its only at night that he notices it.  He wears the postop shoe and states he has no pain.  He presses on it and has no pain.  States it will be a sharp shooting pain intermittently at night.  He has been taking pain medication that he has had from previous surgeries to try to help with sleeping.  Denies injury.    Objective:  Vitals: /62   Wt 103 kg (227 lb)   BMI 30.79 kg/m    BMI= Body mass index is 30.79 kg/m .    A1C: 7.2 (9/9/2022)     General:  Patient is alert and orientated.  NAD.     Vascular:  DP and PT pulses are palpable.  No edema or varicosities noted.  CFT's < 3secs.  Skin temp is normal.     Neuro:  Light and gross touch sensation intact to digits, dorsum, and plantar aspects of the feet.     Derm:  no open lesions to left foot. No pain on palpation of left 5th toe. No redness or signs of acute infection.      Musculoskeletal:  Decrease arch height. Lateral deviation of the right and left halluxes.  Significant decreased range of motion of the right first metatarsal phalangeal joint and inner phalangeal joint.  Previous left third toe amputation.     Assessment:    Diabetic polyneuropathy associated with type 2 diabetes mellitus (H)  Neuropathic pain  Hammertoe of left foot  Lymphedema of both lower extremities      Medical Decision Making/Plan: Clinically there are no signs of infection.  There are no open lesions.  The nail has not regrown.  No pain on palpation on exam.  Discussed that I feel that this may be more neuropathy pain especially as it occurs at night and not when he is on his foot.  Recommend  following up with primary care doctor to get nerve medication to see if that will help.  Also discussed possible physical therapy with transventricular nerve stimulation.  He is going to continue to use over-the-counter lidocaine patches to the area at this time to help with pain.  All questions were answered to patient's satisfaction and he will call for any questions or concerns.      Patient Risk Factor:  Patient is a medium risk factor for infection.     Ayaka Azevedo DPM, Podiatry/Foot and Ankle Surgery               Again, thank you for allowing me to participate in the care of your patient.        Sincerely,        Ayaka Azevedo DPM, Podiatry/Foot and Ankle Surgery

## 2022-12-01 ENCOUNTER — MEDICAL CORRESPONDENCE (OUTPATIENT)
Dept: HEALTH INFORMATION MANAGEMENT | Facility: CLINIC | Age: 77
End: 2022-12-01

## 2022-12-05 ENCOUNTER — HOSPITAL ENCOUNTER (OUTPATIENT)
Dept: PHYSICAL THERAPY | Facility: REHABILITATION | Age: 77
Discharge: HOME | End: 2022-12-05
Payer: COMMERCIAL

## 2022-12-05 DIAGNOSIS — I89.0 LYMPHEDEMA: ICD-10-CM

## 2022-12-05 DIAGNOSIS — I87.8 VENOUS STASIS: Primary | ICD-10-CM

## 2022-12-05 PROCEDURE — 97140 MANUAL THERAPY 1/> REGIONS: CPT | Mod: GP | Performed by: PHYSICAL THERAPIST

## 2022-12-09 DIAGNOSIS — L97.509 TYPE 2 DIABETES MELLITUS WITH FOOT ULCER, WITHOUT LONG-TERM CURRENT USE OF INSULIN (H): ICD-10-CM

## 2022-12-09 DIAGNOSIS — E11.621 TYPE 2 DIABETES MELLITUS WITH FOOT ULCER, WITHOUT LONG-TERM CURRENT USE OF INSULIN (H): ICD-10-CM

## 2022-12-10 NOTE — TELEPHONE ENCOUNTER
"Last Written Prescription Date:  10/4/22  Last Fill Quantity: 180,  # refills: 0   Last office visit provider:  11/25/22     Requested Prescriptions   Pending Prescriptions Disp Refills     metFORMIN (GLUCOPHAGE) 1000 MG tablet [Pharmacy Med Name: METFORMIN HCL TABS 1000MG] 180 tablet 3     Sig: TAKE 1 TABLET TWICE A DAY WITH MEALS       Biguanide Agents Passed - 12/9/2022 11:37 AM        Passed - Patient is age 10 or older        Passed - Patient has documented A1c within the specified period of time.     If HgbA1C is 8 or greater, it needs to be on file within the past 3 months.  If less than 8, must be on file within the past 6 months.     Recent Labs   Lab Test 09/09/22  1145   A1C 7.2*             Passed - Patient's CR is NOT>1.4 OR Patient's EGFR is NOT<45 within past 12 mos.     Recent Labs   Lab Test 08/29/22  0802 12/04/21  0852 12/03/21  1724 07/28/21  0337 04/29/21  1631   GFR  --   --  15  --   --    GFRESTIMATED 60*   < >  --    < > 59*   GFRESTBLACK  --   --   --   --  68    < > = values in this interval not displayed.       Recent Labs   Lab Test 08/29/22  0802 12/04/21  0852 12/03/21  1724   CR 1.24   < >  --    CRPOC  --   --  3.6*    < > = values in this interval not displayed.             Passed - Patient does NOT have a diagnosis of CHF.        Passed - Medication is active on med list        Passed - Recent (6 mo) or future (30 days) visit within the authorizing provider's specialty     Patient had office visit in the last 6 months or has a visit in the next 30 days with authorizing provider or within the authorizing provider's specialty.  See \"Patient Info\" tab in inbasket, or \"Choose Columns\" in Meds & Orders section of the refill encounter.                 Indigo De Los Santos RN 12/10/22 1:50 PM  "

## 2022-12-12 ENCOUNTER — HOSPITAL ENCOUNTER (OUTPATIENT)
Dept: PHYSICAL THERAPY | Facility: REHABILITATION | Age: 77
Discharge: HOME | End: 2022-12-12
Payer: COMMERCIAL

## 2022-12-12 DIAGNOSIS — I87.8 VENOUS STASIS: Primary | ICD-10-CM

## 2022-12-12 DIAGNOSIS — I89.0 LYMPHEDEMA: ICD-10-CM

## 2022-12-12 PROCEDURE — 97140 MANUAL THERAPY 1/> REGIONS: CPT | Mod: GP | Performed by: PHYSICAL THERAPIST

## 2022-12-13 ENCOUNTER — OFFICE VISIT (OUTPATIENT)
Dept: FAMILY MEDICINE | Facility: CLINIC | Age: 77
End: 2022-12-13
Payer: COMMERCIAL

## 2022-12-13 VITALS
HEART RATE: 85 BPM | BODY MASS INDEX: 31.46 KG/M2 | TEMPERATURE: 98.5 F | RESPIRATION RATE: 15 BRPM | SYSTOLIC BLOOD PRESSURE: 144 MMHG | WEIGHT: 232 LBS | DIASTOLIC BLOOD PRESSURE: 16 MMHG | OXYGEN SATURATION: 97 %

## 2022-12-13 DIAGNOSIS — E11.41 DIABETIC MONONEUROPATHY ASSOCIATED WITH TYPE 2 DIABETES MELLITUS (H): Primary | ICD-10-CM

## 2022-12-13 DIAGNOSIS — Z79.899 ENCOUNTER FOR LONG-TERM (CURRENT) USE OF MEDICATIONS: ICD-10-CM

## 2022-12-13 PROCEDURE — 99214 OFFICE O/P EST MOD 30 MIN: CPT | Performed by: FAMILY MEDICINE

## 2022-12-13 RX ORDER — GABAPENTIN 300 MG/1
CAPSULE ORAL
Qty: 60 CAPSULE | Refills: 1 | Status: SHIPPED | OUTPATIENT
Start: 2022-12-13 | End: 2023-01-02

## 2022-12-13 NOTE — PROGRESS NOTES
Assessment & Plan     Encounter for long-term (current) use of medications  Medication review was done no change in therapeutic levels of other medicines    Diabetic mononeuropathy associated with type 2 diabetes mellitus (H)  Patient mainly experiences the neuropathic pain in his left small toe and foot at night we will start with this at at bedtime we may increase it to twice daily depending on his clinical response this was discussed with the patient  - gabapentin (NEURONTIN) 300 MG capsule  Dispense: 60 capsule; Refill: 1                   No follow-ups on file.    Rickey Adamson MD  [facility]    Bandar Lucas is a 77 year old, presenting for the following health issues:  Diabetes (Managing during the day - Changing in sleep habits at night )    The patient is being seen for evaluation of left toe pain.  He sought podiatrist therapy for removal of toenail which did not provide relief.  This is a burning type of discomfort in his left total.  We discussed the use of gabapentin at bedtime 300 mg.  We need to avoid sedation.  He is off of all other sedative type medications.  I gave him 60 tablets we can always titrate this up to twice daily depending on his clinical response patient is very knowledgeable and takes excellent care of his diabetes type 2.  All medications were reviewed at this visit time spent face-to-face and non-face-to-face was 28 minutes.  History of Present Illness       Reason for visit:  Onset of Diabetic Neuropathy in little toe, left foot  Symptom onset:  3-4 weeks ago  Symptoms include:  POain that is severe enough to disrupt my nightime sleep pattern  Symptom intensity:  Moderate  Symptom progression:  Staying the same  Had these symptoms before:  No  What makes it worse:  No  What makes it better:  Ibuprofen, Alleve PM; applying lidocaine strips to toeHe consumes 1 sweetened beverage(s) daily.He exercises with enough effort to increase his heart rate 9 or  less minutes per day.  He exercises with enough effort to increase his heart rate 3 or less days per week.   He is taking medications regularly.             Review of Systems   Constitutional, HEENT, cardiovascular, pulmonary, gi and gu systems are negative, except as otherwise noted.      Objective    There were no vitals taken for this visit.  There is no height or weight on file to calculate BMI.  Physical Exam   GENERAL: healthy, alert and no distress  NECK: no adenopathy, no asymmetry, masses, or scars and thyroid normal to palpation  RESP: lungs clear to auscultation - no rales, rhonchi or wheezes  CV: regular rate and rhythm, normal S1 S2, no S3 or S4, no murmur, click or rub, no peripheral edema and peripheral pulses strong  ABDOMEN: soft, nontender, no hepatosplenomegaly, no masses and bowel sounds normal  MS: no gross musculoskeletal defects noted, no edema

## 2022-12-14 ENCOUNTER — TRANSFERRED RECORDS (OUTPATIENT)
Dept: HEALTH INFORMATION MANAGEMENT | Facility: CLINIC | Age: 77
End: 2022-12-14

## 2022-12-15 ENCOUNTER — OFFICE VISIT (OUTPATIENT)
Dept: VASCULAR SURGERY | Facility: CLINIC | Age: 77
End: 2022-12-15
Attending: PODIATRIST
Payer: COMMERCIAL

## 2022-12-15 VITALS
DIASTOLIC BLOOD PRESSURE: 74 MMHG | BODY MASS INDEX: 31.42 KG/M2 | WEIGHT: 232 LBS | HEIGHT: 72 IN | OXYGEN SATURATION: 96 % | HEART RATE: 82 BPM | SYSTOLIC BLOOD PRESSURE: 134 MMHG

## 2022-12-15 DIAGNOSIS — L97.519 DIABETIC ULCER OF TOE OF RIGHT FOOT ASSOCIATED WITH TYPE 2 DIABETES MELLITUS, UNSPECIFIED ULCER STAGE (H): Primary | ICD-10-CM

## 2022-12-15 DIAGNOSIS — E11.621 DIABETIC ULCER OF TOE OF RIGHT FOOT ASSOCIATED WITH TYPE 2 DIABETES MELLITUS, UNSPECIFIED ULCER STAGE (H): Primary | ICD-10-CM

## 2022-12-15 PROCEDURE — 11042 DBRDMT SUBQ TIS 1ST 20SQCM/<: CPT | Performed by: PODIATRIST

## 2022-12-15 RX ORDER — LANCETS
EACH MISCELLANEOUS
COMMUNITY
Start: 2022-11-07 | End: 2023-01-25

## 2022-12-15 ASSESSMENT — PAIN SCALES - GENERAL: PAINLEVEL: NO PAIN (0)

## 2022-12-15 NOTE — PATIENT INSTRUCTIONS
Important lnstructions        WEIGHT BEARING STATUS: You are to remain NON WEIGHT BEARING on your right foot. NON WEIGHT BEARING MEANS NO PRESSURE ON YOUR FOOT OR HEEL AT ANY TIME FOR ANY REASON!    2. OFFLOADING DEVICE: Must use a A WHEELCHAIR at all times! (do not use affected foot to push wheelchair)      4. ELEVATE: Elevating your leg means laying with your head on a pillow and your foot ABOVE YOUR WAIST.     5. DO NOT MOVE YOUR FOOT.  There is a risk of worsening the wound or incision. To give yourself a higher chance of healing, please DO NOT swing foot back and forth and wiggle foot/toes especially when inside a stabilization device.        Dressing Change lnstructions            If you see green drainage from the wound- use the acetic acid. Otherwise no need to use.       - Dressing Application of  Endoform    1. Endoform should be cut to the size of the wound.  It should touch the edges of the ulcer. It is okay if it overlap the edges a small amount.  Then, moisten the Endoform with saline.(If it is easier for you, you can moisten it before laying it in the wound)    2. Cover the wound with Endoform, followed by dry gauze, and secure with roll gauze if needed.     3. Endoform is naturally used by the body over time so you may not find it in the wound when you change your dressing.  If you do find some, gently cleanse the wound with saline. Do not remove the remaining Endoform, which may appear as an off-white to diana gel, just add Endoform on top.  Usually, more Endoform will need to be added every 5-7 days.     4. Change your top dressing every 1-2 days or as needed depending on drainage.    -Endoform is a collagen dressing created from ovine (sheep) fore-stomach tissue. The collagen extracellular matrix transforms into a soft conforming sheet, which is naturally incorporated into the wound over time.  Collagen dressings are used to stimulate wound healing.   ,       It IS NOT ok to get your wound wet in  the bath or shower    SEEK MEDICAL CARE IF:  You have an increase in swelling, pain, or redness around the wound.  You have an increase in the amount of pus coming from the wound.  There is a bad smell coming from the wound.  The wound appears to be worsening/enlarging  You have a fever greater than 101.5 F      It is ok to continue current wound care treatment/products for the next 2-3 days until new wound care supplies are ordered and arrive. If longer than this please contact our office at [phone].        We want to hear from you!   In the next few weeks, you should receive a call or email to complete a survey about your visit at [facility]. Please help us improve your appointment experience by letting us know how we did today. We strive to make your experience good and value any ways in which we could do better.      We value your input and suggestions.    Thank you for choosing the [facility]!

## 2022-12-15 NOTE — PROGRESS NOTES
FOOT AND ANKLE SURGERY/PODIATRY Progress Note      ASSESSMENT:   Diabetic Ulceration right hallux  Venous Stasis       TREATMENT:  -The right hallux ulcer is measuring smaller today. I recommend he continue with endoform and non-weight bearing with knee walker.     -After discussion of risk factors and consent obtained 2% Lidocaine HCL jelly was applied, under clean conditions, the right and foot ulceration(s) were debrided using #15 blade scalpel.  Devitalized and nonviable tissue, along with any fibrin and slough, was removed to improve granulation tissue formation, stimulate wound healing, decrease overall bacteria load, disrupt biofilm formation and decrease edge senescence. Wound drainage was scant No. Total excisional debridement was 0.99 sq cm into the subcutaneous tissue with a depth of 0.2 cm.   Ulcers were improved afterwards and .  Measures were as noted on the flow sheet. Collagen with a gauze dresssing was applied. He will continue to apply Collagen with a gauze dresssing as directed.     -He will follow-up in 3 weeks.    Nakul Salazar DPM  [facility] [place]      HPI: Lance Ibrahim was seen again today for a sore on the right hallux. He has remained mostly non-weight bearing with the knee walker but admits walking more than directed.     Past Medical History:   Diagnosis Date     Arthritis     osteoarthritis knees     BPH      CAD (coronary artery disease)     subtotal occlusion in the small distal LAD      Cardiomyopathy      Cerebral artery occlusion with cerebral infarction     TIA 1993, no residual     Chronic kidney disease      Chronic rhinitis      HTN (hypertension)      Hyperlipidemia      Kidney stone      PVD (peripheral vascular disease)      Sleep apnea     doesn't use cpap     TIA (transient ischaemic attack) 1993     Type 2 diabetes mellitus - 11/08/2018     Ureteral stone        Past Surgical History:   Procedure Laterality Date     AMPUTATE TOE(S) Left 10/15/2018  "   Procedure: AMPUTATE TOE(S);  Left third toe amputation;  Surgeon: Ayaka Azevedo DPM, Podiatry/Foot and Ankle Surgery;  Location: [facility] OR     ARTHROPLASTY KNEE Right 12/07/2018    Procedure: Right total knee arthroplasty;  Surgeon: Issa Cunha MD;  Location: [facility] OR     COLONOSCOPY  03/09/2013    Procedure: COLONOSCOPY;  COLONOSCOPY;  Surgeon: Chau Hogan MD;  Location:  GI     CV HEART CATHETERIZATION WITH POSSIBLE INTERVENTION Left 03/05/2019    Procedure: Coronary Angiogram;  Surgeon: Nas Linda MD;  Location:  HEART CARDIAC CATH LAB     Diastasis recti repair  1985     EXTRACAPSULAR CATARACT EXTRATION WITH INTRAOCULAR LENS IMPLANT Left 03/13/2017     EXTRACAPSULAR CATARACT EXTRATION WITH INTRAOCULAR LENS IMPLANT Right      FOOT SURGERY  04/2013    cyst removal      HERNIA REPAIR  07/13/2004    ventral      ORIF Shoulder Left      Ventral hernia repair NOS  1987       Allergies   Allergen Reactions     Penicillins Anaphylaxis     8/25/22 - tolerated cefepime      Sulfa Drugs      \"itchy rash, swelling of face and hands\"     Ancef [Cefazolin] Rash         Current Outpatient Medications:      acetic acid 0.25 % irrigation, Irrigate with as directed daily Apply to gauze and let soak on wound for 5 minutes prior to dressing changes., Disp: 500 mL, Rfl: 3     amLODIPine (NORVASC) 5 MG tablet, Take by mouth every 24 hours, Disp: , Rfl:      aspirin (ASA) 81 MG EC tablet, Take by mouth every 24 hours, Disp: , Rfl:      atorvastatin (LIPITOR) 40 MG tablet, Take 1 tablet (40 mg) by mouth every 24 hours, Disp: 90 tablet, Rfl: 3     blood glucose (NO BRAND SPECIFIED) lancets standard, Use to test blood sugar 2 times daily or as directed., Disp: 200 lancet, Rfl: 3     blood glucose (NO BRAND SPECIFIED) test strip, Use to test blood sugar 2 times daily or as directed., Disp: 200 strip, Rfl: 3     blood glucose monitoring (NO BRAND SPECIFIED) meter device kit, Use to test blood sugar 2 times daily or " as directed., Disp: 1 kit, Rfl: 0     clotrimazole-betamethasone (LOTRISONE) 1-0.05 % external cream, Apply topically 2 times daily, Disp: 50 g, Rfl: 1     colchicine (COLCYRS) 0.6 MG tablet, Take 1 tablet (0.6 mg) by mouth daily, Disp: 90 tablet, Rfl: 1     Ferrous Gluconate 240 (27 Fe) MG TABS, Take 1 tablet by mouth daily , Disp: , Rfl:      finasteride (PROSCAR) 5 MG tablet, Take by mouth every 24 hours, Disp: , Rfl:      fluticasone (FLONASE) 50 MCG/ACT nasal spray, Spray 1-2 sprays in nostril every 24 hours, Disp: , Rfl:      gabapentin (NEURONTIN) 300 MG capsule, One prior to bedtime, Disp: 60 capsule, Rfl: 1     isosorbide mononitrate (IMDUR) 30 MG 24 hr tablet, Take 1 tablet (30 mg) by mouth daily, Disp: 90 tablet, Rfl: 3     labetalol (NORMODYNE) 200 MG tablet, TAKE 1 TABLET BY MOUTH TWICE A DAY, Disp: 180 tablet, Rfl: 2     Lidocaine (LIDOCARE) 4 % Patch, Place 1 patch onto the skin every 24 hours To prevent lidocaine toxicity, patient should be patch free for 12 hrs daily. (Patient taking differently: Place 1 patch onto the skin every 24 hours To prevent lidocaine toxicity, patient should be patch free for 12 hrs daily.  To back every am), Disp: , Rfl:      lisinopril (ZESTRIL) 10 MG tablet, Take 1 tablet (10 mg) by mouth every 24 hours, Disp: 90 tablet, Rfl: 3     loratadine (CLARITIN) 10 MG tablet, Take by mouth every 24 hours, Disp: , Rfl:      metFORMIN (GLUCOPHAGE) 1000 MG tablet, TAKE 1 TABLET TWICE A DAY WITH MEALS, Disp: 180 tablet, Rfl: 0     Microlet Lancets MISC, USE TO TEST BLOOD SUGAR 2 TIMES DAILY OR AS DIRECTED., Disp: , Rfl:      multivitamin w/minerals (THERA-VIT-M) tablet, Take 1 tablet by mouth daily, Disp: , Rfl:      Probiotic Product (FORTIFY 30 BILLION PROBIOT 50+ PO), , Disp: , Rfl:      Semaglutide (RYBELSUS) 7 MG TABS, Take 7 mg by mouth daily, Disp: 90 tablet, Rfl: 3     tamsulosin (FLOMAX) 0.4 MG 24 hr capsule, Take 1 capsule by mouth daily., Disp: , Rfl:      tiZANidine  (ZANAFLEX) 2 MG tablet, Take 4 mg by mouth At Bedtime, Disp: , Rfl:      torsemide (DEMADEX) 20 MG tablet, Take 1 tablet (20 mg) by mouth daily, Disp: 90 tablet, Rfl: 2    Review of Systems - 10 point Review of Systems is negative except for right hallux ulcer which is noted in HPI.      OBJECTIVE:  /74   Pulse 82   Ht 6' (1.829 m)   Wt 232 lb (105.2 kg)   SpO2 96%   BMI 31.46 kg/m    General appearance: Patient is alert and fully cooperative with history & exam.  No sign of distress is noted during the visit.    Vascular: Dorsalis pedis palpableRight.  Dermatologic:    Wound 10/14/18 Foot Abrasion(s) GRAYISH DISCOLORATION (Active)       Wound 12/07/18 Left;Lower Foot Amputation blackened area third left toe amputation incision site (Active)       Wound 07/27/20 Left Toe (Comment  which one) Ulceration Left foot pinky toe (Active)       Wound 07/27/20 Right Toe (Comment  which one) Ulceration Right great toe wound  (Active)       Wound Toe (Comment  which one) Ulceration (Active)       Wound Foot Ulceration (Active)       Wound Foot Ulceration (Active)       Rash 10/21/18 1543 Bilateral other (see comments) (Active)       Rash 07/27/20 2015 Left lower leg other (see comments) (Active)       VASC Wound right hallux (Active)   Pre Size Length 0.4 10/27/22 1200   Pre Size Width 0.9 10/27/22 1200   Pre Size Depth 0.2 10/27/22 1200   Pre Total Sq cm 0.36 10/27/22 1200   Post Size Length 1.2 10/27/22 1200   Post Size Width 0.5 10/27/22 1200   Post Size Depth 0.2 10/27/22 1200   Post Total Sq cm 0.6 10/27/22 1200   Undermined 0.4 @ 8o'clock 10/06/22 1300   Description calloused 10/27/22 1200       Incision/Surgical Site 10/15/18 Left Foot (Active)       Incision/Surgical Site 12/07/18 Right Knee (Active)   Granular base right hallux ulcer with minimal green discoloration at peripheral wound, no erythema.  Neurologic: Diminished to light touch Right.  Musculoskeletal: Contracted digits noted Right.    Imaging:      No results found.       Picture:

## 2022-12-16 ENCOUNTER — TRANSFERRED RECORDS (OUTPATIENT)
Dept: HEALTH INFORMATION MANAGEMENT | Facility: CLINIC | Age: 77
End: 2022-12-16

## 2022-12-18 DIAGNOSIS — I10 ESSENTIAL HYPERTENSION: ICD-10-CM

## 2022-12-18 DIAGNOSIS — I10 ESSENTIAL (PRIMARY) HYPERTENSION: ICD-10-CM

## 2022-12-19 ENCOUNTER — HOSPITAL ENCOUNTER (OUTPATIENT)
Dept: PHYSICAL THERAPY | Facility: REHABILITATION | Age: 77
Discharge: HOME | End: 2022-12-19
Payer: COMMERCIAL

## 2022-12-19 DIAGNOSIS — I89.0 LYMPHEDEMA: ICD-10-CM

## 2022-12-19 DIAGNOSIS — I87.8 VENOUS STASIS: Primary | ICD-10-CM

## 2022-12-19 PROCEDURE — 97140 MANUAL THERAPY 1/> REGIONS: CPT | Mod: GP | Performed by: PHYSICAL THERAPIST

## 2022-12-20 NOTE — TELEPHONE ENCOUNTER
Patient no longer goes to [facility].  Forward to [place] new primary care provider       Per office visit on 6/16/22:  Rickey Adamson MD (Physician)     Family Medicine        SUBJECTIVE:   Lance Ibrahim is a 77 year old male who presents for Preventive Visit.  Chief complaint: I am here for my annual wellness exam and I need a new provider as they have moved to the area

## 2022-12-21 RX ORDER — TORSEMIDE 20 MG/1
TABLET ORAL
Qty: 90 TABLET | Refills: 2 | Status: SHIPPED | OUTPATIENT
Start: 2022-12-21 | End: 2023-04-04

## 2022-12-21 RX ORDER — AMLODIPINE BESYLATE 5 MG/1
TABLET ORAL
Qty: 90 TABLET | Refills: 3 | Status: SHIPPED | OUTPATIENT
Start: 2022-12-21 | End: 2023-03-07

## 2022-12-26 ENCOUNTER — HOSPITAL ENCOUNTER (OUTPATIENT)
Dept: PHYSICAL THERAPY | Facility: REHABILITATION | Age: 77
Discharge: HOME | End: 2022-12-26
Payer: COMMERCIAL

## 2022-12-26 DIAGNOSIS — I87.8 VENOUS STASIS: Primary | ICD-10-CM

## 2022-12-26 DIAGNOSIS — I89.0 LYMPHEDEMA: ICD-10-CM

## 2022-12-26 PROCEDURE — 97140 MANUAL THERAPY 1/> REGIONS: CPT | Mod: GP | Performed by: PHYSICAL THERAPIST

## 2023-01-02 DIAGNOSIS — E11.41 DIABETIC MONONEUROPATHY ASSOCIATED WITH TYPE 2 DIABETES MELLITUS (H): ICD-10-CM

## 2023-01-02 RX ORDER — GABAPENTIN 300 MG/1
300 CAPSULE ORAL 3 TIMES DAILY
Qty: 270 CAPSULE | Refills: 0 | Status: SHIPPED | OUTPATIENT
Start: 2023-01-02 | End: 2023-04-10

## 2023-01-03 ENCOUNTER — TRANSFERRED RECORDS (OUTPATIENT)
Dept: HEALTH INFORMATION MANAGEMENT | Facility: CLINIC | Age: 78
End: 2023-01-03

## 2023-01-05 ENCOUNTER — OFFICE VISIT (OUTPATIENT)
Dept: VASCULAR SURGERY | Facility: CLINIC | Age: 78
End: 2023-01-05
Attending: PODIATRIST
Payer: COMMERCIAL

## 2023-01-05 VITALS
DIASTOLIC BLOOD PRESSURE: 80 MMHG | HEART RATE: 79 BPM | SYSTOLIC BLOOD PRESSURE: 144 MMHG | RESPIRATION RATE: 16 BRPM | HEIGHT: 72 IN | BODY MASS INDEX: 31.42 KG/M2 | WEIGHT: 232 LBS | OXYGEN SATURATION: 98 %

## 2023-01-05 DIAGNOSIS — E11.621 DIABETIC ULCER OF TOE OF RIGHT FOOT ASSOCIATED WITH TYPE 2 DIABETES MELLITUS, UNSPECIFIED ULCER STAGE (H): Primary | ICD-10-CM

## 2023-01-05 DIAGNOSIS — L97.519 DIABETIC ULCER OF TOE OF RIGHT FOOT ASSOCIATED WITH TYPE 2 DIABETES MELLITUS, UNSPECIFIED ULCER STAGE (H): Primary | ICD-10-CM

## 2023-01-05 PROCEDURE — 11042 DBRDMT SUBQ TIS 1ST 20SQCM/<: CPT | Performed by: PODIATRIST

## 2023-01-05 ASSESSMENT — PAIN SCALES - GENERAL: PAINLEVEL: NO PAIN (0)

## 2023-01-05 NOTE — PROGRESS NOTES
FOOT AND ANKLE SURGERY/PODIATRY Progress Note      ASSESSMENT:   Diabetic Ulceration right hallux  Venous Stasis       TREATMENT:  -The right hallux ulcer is measuring similar to his previous visit. Again discussed that continued walking will delay wound healing and increase risk of infection.     -I recommend he continue with endoform and non-weight bearing with knee walker.     -After discussion of risk factors and consent obtained 2% Lidocaine HCL jelly was applied, under clean conditions, the right and foot ulceration(s) were debrided using #15 blade scalpel.  Devitalized and nonviable tissue, along with any fibrin and slough, was removed to improve granulation tissue formation, stimulate wound healing, decrease overall bacteria load, disrupt biofilm formation and decrease edge senescence. Wound drainage was scant No. Total excisional debridement was 0.99 sq cm into the subcutaneous tissue with a depth of 0.2 cm.   Ulcers were improved afterwards and .  Measures were as noted on the flow sheet. Collagen with a gauze dresssing was applied. He will continue to apply Collagen with a gauze dresssing as directed.     -He will follow-up in 3 weeks.    Nakul Salazar DPM  [facility] [place]      HPI: Lance Ibrahim was seen again today for a sore on the right hallux. He has remained mostly non-weight bearing with the knee walker but admits walking more than directed.     Past Medical History:   Diagnosis Date     Arthritis     osteoarthritis knees     BPH      CAD (coronary artery disease)     subtotal occlusion in the small distal LAD      Cardiomyopathy      Cerebral artery occlusion with cerebral infarction     TIA 1993, no residual     Chronic kidney disease      Chronic rhinitis      HTN (hypertension)      Hyperlipidemia      Kidney stone      PVD (peripheral vascular disease)      Sleep apnea     doesn't use cpap     TIA (transient ischaemic attack) 1993     Type 2 diabetes mellitus -  "11/08/2018     Ureteral stone        Past Surgical History:   Procedure Laterality Date     AMPUTATE TOE(S) Left 10/15/2018    Procedure: AMPUTATE TOE(S);  Left third toe amputation;  Surgeon: Ayaka Azevedo DPM, Podiatry/Foot and Ankle Surgery;  Location: [facility] OR     ARTHROPLASTY KNEE Right 12/07/2018    Procedure: Right total knee arthroplasty;  Surgeon: Issa Cunha MD;  Location: [facility] OR     COLONOSCOPY  03/09/2013    Procedure: COLONOSCOPY;  COLONOSCOPY;  Surgeon: Chau Hogan MD;  Location:  GI     CV HEART CATHETERIZATION WITH POSSIBLE INTERVENTION Left 03/05/2019    Procedure: Coronary Angiogram;  Surgeon: Nas Linda MD;  Location:  HEART CARDIAC CATH LAB     Diastasis recti repair  1985     EXTRACAPSULAR CATARACT EXTRATION WITH INTRAOCULAR LENS IMPLANT Left 03/13/2017     EXTRACAPSULAR CATARACT EXTRATION WITH INTRAOCULAR LENS IMPLANT Right      FOOT SURGERY  04/2013    cyst removal      HERNIA REPAIR  07/13/2004    ventral      ORIF Shoulder Left      Ventral hernia repair NOS  1987       Allergies   Allergen Reactions     Penicillins Anaphylaxis     8/25/22 - tolerated cefepime      Sulfa Drugs      \"itchy rash, swelling of face and hands\"     Ancef [Cefazolin] Rash         Current Outpatient Medications:      acetic acid 0.25 % irrigation, Irrigate with as directed daily Apply to gauze and let soak on wound for 5 minutes prior to dressing changes., Disp: 500 mL, Rfl: 3     amLODIPine (NORVASC) 5 MG tablet, TAKE 1 TABLET BY MOUTH EVERY DAY, Disp: 90 tablet, Rfl: 3     aspirin (ASA) 81 MG EC tablet, Take by mouth every 24 hours, Disp: , Rfl:      atorvastatin (LIPITOR) 40 MG tablet, Take 1 tablet (40 mg) by mouth every 24 hours, Disp: 90 tablet, Rfl: 3     blood glucose (NO BRAND SPECIFIED) test strip, Use to test blood sugar 2 times daily or as directed., Disp: 200 strip, Rfl: 3     blood glucose monitoring (NO BRAND SPECIFIED) meter device kit, Use to test blood sugar 2 times daily or " as directed., Disp: 1 kit, Rfl: 0     colchicine (COLCYRS) 0.6 MG tablet, Take 1 tablet (0.6 mg) by mouth daily, Disp: 90 tablet, Rfl: 1     Ferrous Gluconate 240 (27 Fe) MG TABS, Take 1 tablet by mouth daily , Disp: , Rfl:      finasteride (PROSCAR) 5 MG tablet, Take by mouth every 24 hours, Disp: , Rfl:      fluticasone (FLONASE) 50 MCG/ACT nasal spray, Spray 1-2 sprays in nostril every 24 hours, Disp: , Rfl:      gabapentin (NEURONTIN) 300 MG capsule, Take 1 capsule (300 mg) by mouth 3 times daily, Disp: 270 capsule, Rfl: 0     isosorbide mononitrate (IMDUR) 30 MG 24 hr tablet, Take 1 tablet (30 mg) by mouth daily, Disp: 90 tablet, Rfl: 3     labetalol (NORMODYNE) 200 MG tablet, TAKE 1 TABLET BY MOUTH TWICE A DAY, Disp: 180 tablet, Rfl: 2     Lidocaine (LIDOCARE) 4 % Patch, Place 1 patch onto the skin every 24 hours To prevent lidocaine toxicity, patient should be patch free for 12 hrs daily. (Patient taking differently: Place 1 patch onto the skin every 24 hours To prevent lidocaine toxicity, patient should be patch free for 12 hrs daily.  To back every am), Disp: , Rfl:      lisinopril (ZESTRIL) 10 MG tablet, Take 1 tablet (10 mg) by mouth every 24 hours, Disp: 90 tablet, Rfl: 3     loratadine (CLARITIN) 10 MG tablet, Take by mouth every 24 hours, Disp: , Rfl:      metFORMIN (GLUCOPHAGE) 1000 MG tablet, TAKE 1 TABLET TWICE A DAY WITH MEALS, Disp: 180 tablet, Rfl: 0     Microlet Lancets MISC, USE TO TEST BLOOD SUGAR 2 TIMES DAILY OR AS DIRECTED., Disp: , Rfl:      multivitamin w/minerals (THERA-VIT-M) tablet, Take 1 tablet by mouth daily, Disp: , Rfl:      Probiotic Product (FORTIFY 30 BILLION PROBIOT 50+ PO), , Disp: , Rfl:      Semaglutide (RYBELSUS) 7 MG TABS, Take 7 mg by mouth daily, Disp: 90 tablet, Rfl: 3     tamsulosin (FLOMAX) 0.4 MG 24 hr capsule, Take 1 capsule by mouth daily., Disp: , Rfl:      torsemide (DEMADEX) 20 MG tablet, TAKE 1 TABLET BY MOUTH EVERY DAY, Disp: 90 tablet, Rfl: 2    Review of  Systems - 10 point Review of Systems is negative except for right hallux ulcer which is noted in HPI.      OBJECTIVE:  BP (!) 144/80   Pulse 79   Resp 16   Ht 6' (1.829 m)   Wt 232 lb (105.2 kg)   SpO2 98%   BMI 31.46 kg/m    General appearance: Patient is alert and fully cooperative with history & exam.  No sign of distress is noted during the visit.    Vascular: Dorsalis pedis palpableRight.  Dermatologic:    Wound 10/14/18 Foot Abrasion(s) GRAYISH DISCOLORATION (Active)       Wound 12/07/18 Left;Lower Foot Amputation blackened area third left toe amputation incision site (Active)       Wound 07/27/20 Left Toe (Comment  which one) Ulceration Left foot pinky toe (Active)       Wound 07/27/20 Right Toe (Comment  which one) Ulceration Right great toe wound  (Active)       Wound Toe (Comment  which one) Ulceration (Active)       Wound Foot Ulceration (Active)       Wound Foot Ulceration (Active)       Rash 10/21/18 1543 Bilateral other (see comments) (Active)       Rash 07/27/20 2015 Left lower leg other (see comments) (Active)       VASC Wound right hallux (Active)   Pre Size Length 0.4 10/27/22 1200   Pre Size Width 0.9 10/27/22 1200   Pre Size Depth 0.2 10/27/22 1200   Pre Total Sq cm 0.36 10/27/22 1200   Post Size Length 1.2 10/27/22 1200   Post Size Width 0.5 10/27/22 1200   Post Size Depth 0.2 10/27/22 1200   Post Total Sq cm 0.6 10/27/22 1200   Undermined 0.4 @ 8o'clock 10/06/22 1300   Description calloused 10/27/22 1200       Incision/Surgical Site 10/15/18 Left Foot (Active)       Incision/Surgical Site 12/07/18 Right Knee (Active)   Granular base right hallux ulcer with minimal green discoloration at peripheral wound, no erythema.  Neurologic: Diminished to light touch Right.  Musculoskeletal: Contracted digits noted Right.    Imaging:     No results found.       Picture:

## 2023-01-05 NOTE — PATIENT INSTRUCTIONS
Important lnstructions        WEIGHT BEARING STATUS: You are to remain NON WEIGHT BEARING on your right foot. NON WEIGHT BEARING MEANS NO PRESSURE ON YOUR FOOT OR HEEL AT ANY TIME FOR ANY REASON!    2. OFFLOADING DEVICE: Must use a A WHEELCHAIR at all times! (do not use affected foot to push wheelchair)      4. ELEVATE: Elevating your leg means laying with your head on a pillow and your foot ABOVE YOUR WAIST.     5. DO NOT MOVE YOUR FOOT.  There is a risk of worsening the wound or incision. To give yourself a higher chance of healing, please DO NOT swing foot back and forth and wiggle foot/toes especially when inside a stabilization device.        Dressing Change lnstructions            If you see green drainage from the wound- use the acetic acid. Otherwise no need to use.       - Dressing Application of  Endoform    1. Endoform should be cut to the size of the wound.  It should touch the edges of the ulcer. It is okay if it overlap the edges a small amount.  Then, moisten the Endoform with saline.(If it is easier for you, you can moisten it before laying it in the wound)    2. Cover the wound with Endoform, followed by dry gauze, and secure with roll gauze if needed.     3. Endoform is naturally used by the body over time so you may not find it in the wound when you change your dressing.  If you do find some, gently cleanse the wound with saline. Do not remove the remaining Endoform, which may appear as an off-white to diana gel, just add Endoform on top.  Usually, more Endoform will need to be added every 5-7 days.     4. Change your top dressing every 1-2 days or as needed depending on drainage.    -Endoform is a collagen dressing created from ovine (sheep) fore-stomach tissue. The collagen extracellular matrix transforms into a soft conforming sheet, which is naturally incorporated into the wound over time.  Collagen dressings are used to stimulate wound healing.   ,       It IS NOT ok to get your wound wet in  the bath or shower    SEEK MEDICAL CARE IF:  You have an increase in swelling, pain, or redness around the wound.  You have an increase in the amount of pus coming from the wound.  There is a bad smell coming from the wound.  The wound appears to be worsening/enlarging  You have a fever greater than 101.5 F      It is ok to continue current wound care treatment/products for the next 2-3 days until new wound care supplies are ordered and arrive. If longer than this please contact our office at [phone].        We want to hear from you!   In the next few weeks, you should receive a call or email to complete a survey about your visit at [facility]. Please help us improve your appointment experience by letting us know how we did today. We strive to make your experience good and value any ways in which we could do better.      We value your input and suggestions.    Thank you for choosing the [facility]!

## 2023-01-25 DIAGNOSIS — E11.9 TYPE 2 DIABETES, HBA1C GOAL < 7% (H): Primary | ICD-10-CM

## 2023-01-25 RX ORDER — LANCETS
EACH MISCELLANEOUS
Qty: 100 EACH | Refills: 1 | Status: SHIPPED | OUTPATIENT
Start: 2023-01-25 | End: 2023-12-04

## 2023-01-25 NOTE — TELEPHONE ENCOUNTER
General Call      Reason for Call:  Pt calling stating that he needs to have   [employer] Microlets Colors 100 lancettes    Pharm:  [facility]    What are your questions or concerns:  n/a    Date of last appointment with provider: n/a    Could we send this information to you in [facility] or would you prefer to receive a phone call?:   Patient would prefer a phone call   Okay to leave a detailed message?: Yes at Cell number on file:    Telephone Information:   Mobile [phone]      You can access the FollowMyHealth Patient Portal offered by [facility] by registering at the following website: [email or web address]. By joining [employer]’s FollowMyHealth portal, you will also be able to view your health information using other applications (apps) compatible with our system.

## 2023-01-26 ENCOUNTER — OFFICE VISIT (OUTPATIENT)
Dept: VASCULAR SURGERY | Facility: CLINIC | Age: 78
End: 2023-01-26
Attending: PODIATRIST
Payer: COMMERCIAL

## 2023-01-26 VITALS
OXYGEN SATURATION: 94 % | HEIGHT: 72 IN | HEART RATE: 86 BPM | SYSTOLIC BLOOD PRESSURE: 132 MMHG | BODY MASS INDEX: 31.42 KG/M2 | WEIGHT: 232 LBS | DIASTOLIC BLOOD PRESSURE: 70 MMHG

## 2023-01-26 DIAGNOSIS — E08.621 DIABETIC ULCER OF TOE OF RIGHT FOOT ASSOCIATED WITH DIABETES MELLITUS DUE TO UNDERLYING CONDITION, LIMITED TO BREAKDOWN OF SKIN (H): Primary | ICD-10-CM

## 2023-01-26 DIAGNOSIS — L97.511 DIABETIC ULCER OF TOE OF RIGHT FOOT ASSOCIATED WITH DIABETES MELLITUS DUE TO UNDERLYING CONDITION, LIMITED TO BREAKDOWN OF SKIN (H): Primary | ICD-10-CM

## 2023-01-26 PROCEDURE — 11042 DBRDMT SUBQ TIS 1ST 20SQCM/<: CPT | Performed by: PODIATRIST

## 2023-01-26 ASSESSMENT — PAIN SCALES - GENERAL: PAINLEVEL: NO PAIN (0)

## 2023-01-26 NOTE — PATIENT INSTRUCTIONS
Important lnstructions        WEIGHT BEARING STATUS: You are to remain NON WEIGHT BEARING on your right foot. NON WEIGHT BEARING MEANS NO PRESSURE ON YOUR FOOT OR HEEL AT ANY TIME FOR ANY REASON!    2. OFFLOADING DEVICE: Must use a A WHEELCHAIR at all times! (do not use affected foot to push wheelchair)      4. ELEVATE: Elevating your leg means laying with your head on a pillow and your foot ABOVE YOUR WAIST.     5. DO NOT MOVE YOUR FOOT.  There is a risk of worsening the wound or incision. To give yourself a higher chance of healing, please DO NOT swing foot back and forth and wiggle foot/toes especially when inside a stabilization device.        Dressing Change lnstructions            If you see green drainage from the wound- use the acetic acid. Otherwise no need to use.       - Dressing Application of  Endoform    1. Endoform should be cut to the size of the wound.  It should touch the edges of the ulcer. It is okay if it overlap the edges a small amount.  Then, moisten the Endoform with saline.(If it is easier for you, you can moisten it before laying it in the wound)    2. Cover the wound with Endoform, followed by dry gauze, and secure with roll gauze if needed.     3. Endoform is naturally used by the body over time so you may not find it in the wound when you change your dressing.  If you do find some, gently cleanse the wound with saline. Do not remove the remaining Endoform, which may appear as an off-white to diana gel, just add Endoform on top.  Usually, more Endoform will need to be added every 5-7 days.     4. Change your top dressing every 1-2 days or as needed depending on drainage.    -Endoform is a collagen dressing created from ovine (sheep) fore-stomach tissue. The collagen extracellular matrix transforms into a soft conforming sheet, which is naturally incorporated into the wound over time.  Collagen dressings are used to stimulate wound healing.   ,       It IS NOT ok to get your wound wet in  the bath or shower    SEEK MEDICAL CARE IF:  You have an increase in swelling, pain, or redness around the wound.  You have an increase in the amount of pus coming from the wound.  There is a bad smell coming from the wound.  The wound appears to be worsening/enlarging  You have a fever greater than 101.5 F      It is ok to continue current wound care treatment/products for the next 2-3 days until new wound care supplies are ordered and arrive. If longer than this please contact our office at [phone].        We want to hear from you!   In the next few weeks, you should receive a call or email to complete a survey about your visit at [facility]. Please help us improve your appointment experience by letting us know how we did today. We strive to make your experience good and value any ways in which we could do better.      We value your input and suggestions.    Thank you for choosing the [facility]!

## 2023-01-26 NOTE — PROGRESS NOTES
FOOT AND ANKLE SURGERY/PODIATRY Progress Note      ASSESSMENT:   Diabetic Ulceration right hallux  Venous Stasis       TREATMENT:  -The right hallux ulcer is measuring similar to his previous visit. Again discussed that continued walking will delay wound healing and increase risk of infection.     -I recommend he continue with endoform and non-weight bearing with knee walker.     -After discussion of risk factors and consent obtained 2% Lidocaine HCL jelly was applied, under clean conditions, the right and foot ulceration(s) were debrided using #15 blade scalpel.  Devitalized and nonviable tissue, along with any fibrin and slough, was removed to improve granulation tissue formation, stimulate wound healing, decrease overall bacteria load, disrupt biofilm formation and decrease edge senescence. Wound drainage was scant No. Total excisional debridement was 0.3 sq cm into the subcutaneous tissue with a depth of 0.2 cm.   Ulcers were improved afterwards and .  Measures were as noted on the flow sheet. Collagen with a gauze dresssing was applied. He will continue to apply Collagen with a gauze dresssing as directed.     -He will follow-up in 3-4 weeks.    Nakul Salazar DPM  [facility] [place]      HPI: Lance Ibrahim was seen again today for a sore on the right hallux. He has remained mostly non-weight bearing with the knee walker but admits walking more than directed.     Past Medical History:   Diagnosis Date     Arthritis     osteoarthritis knees     BPH      CAD (coronary artery disease)     subtotal occlusion in the small distal LAD      Cardiomyopathy      Cerebral artery occlusion with cerebral infarction     TIA 1993, no residual     Chronic kidney disease      Chronic rhinitis      HTN (hypertension)      Hyperlipidemia      Kidney stone      PVD (peripheral vascular disease)      Sleep apnea     doesn't use cpap     TIA (transient ischaemic attack) 1993     Type 2 diabetes mellitus -  "11/08/2018     Ureteral stone        Past Surgical History:   Procedure Laterality Date     AMPUTATE TOE(S) Left 10/15/2018    Procedure: AMPUTATE TOE(S);  Left third toe amputation;  Surgeon: Ayaka Azevedo DPM, Podiatry/Foot and Ankle Surgery;  Location: [facility] OR     ARTHROPLASTY KNEE Right 12/07/2018    Procedure: Right total knee arthroplasty;  Surgeon: Issa Cunha MD;  Location: [facility] OR     COLONOSCOPY  03/09/2013    Procedure: COLONOSCOPY;  COLONOSCOPY;  Surgeon: Chau Hogan MD;  Location:  GI     CV HEART CATHETERIZATION WITH POSSIBLE INTERVENTION Left 03/05/2019    Procedure: Coronary Angiogram;  Surgeon: Nas Linda MD;  Location:  HEART CARDIAC CATH LAB     Diastasis recti repair  1985     EXTRACAPSULAR CATARACT EXTRATION WITH INTRAOCULAR LENS IMPLANT Left 03/13/2017     EXTRACAPSULAR CATARACT EXTRATION WITH INTRAOCULAR LENS IMPLANT Right      FOOT SURGERY  04/2013    cyst removal      HERNIA REPAIR  07/13/2004    ventral      ORIF Shoulder Left      Ventral hernia repair NOS  1987       Allergies   Allergen Reactions     Penicillins Anaphylaxis     8/25/22 - tolerated cefepime      Sulfa Drugs      \"itchy rash, swelling of face and hands\"     Ancef [Cefazolin] Rash         Current Outpatient Medications:      acetic acid 0.25 % irrigation, Irrigate with as directed daily Apply to gauze and let soak on wound for 5 minutes prior to dressing changes., Disp: 500 mL, Rfl: 3     amLODIPine (NORVASC) 5 MG tablet, TAKE 1 TABLET BY MOUTH EVERY DAY, Disp: 90 tablet, Rfl: 3     aspirin (ASA) 81 MG EC tablet, Take by mouth every 24 hours, Disp: , Rfl:      atorvastatin (LIPITOR) 40 MG tablet, Take 1 tablet (40 mg) by mouth every 24 hours, Disp: 90 tablet, Rfl: 3     blood glucose (NO BRAND SPECIFIED) test strip, Use to test blood sugar 2 times daily or as directed., Disp: 200 strip, Rfl: 3     blood glucose monitoring (NO BRAND SPECIFIED) meter device kit, Use to test blood sugar 2 times daily or " as directed., Disp: 1 kit, Rfl: 0     colchicine (COLCYRS) 0.6 MG tablet, Take 1 tablet (0.6 mg) by mouth daily, Disp: 90 tablet, Rfl: 1     Ferrous Gluconate 240 (27 Fe) MG TABS, Take 1 tablet by mouth daily , Disp: , Rfl:      finasteride (PROSCAR) 5 MG tablet, Take by mouth every 24 hours, Disp: , Rfl:      fluticasone (FLONASE) 50 MCG/ACT nasal spray, Spray 1-2 sprays in nostril every 24 hours, Disp: , Rfl:      gabapentin (NEURONTIN) 300 MG capsule, Take 1 capsule (300 mg) by mouth 3 times daily, Disp: 270 capsule, Rfl: 0     isosorbide mononitrate (IMDUR) 30 MG 24 hr tablet, Take 1 tablet (30 mg) by mouth daily, Disp: 90 tablet, Rfl: 3     labetalol (NORMODYNE) 200 MG tablet, TAKE 1 TABLET BY MOUTH TWICE A DAY, Disp: 180 tablet, Rfl: 2     Lidocaine (LIDOCARE) 4 % Patch, Place 1 patch onto the skin every 24 hours To prevent lidocaine toxicity, patient should be patch free for 12 hrs daily. (Patient taking differently: Place 1 patch onto the skin every 24 hours To prevent lidocaine toxicity, patient should be patch free for 12 hrs daily.  To back every am), Disp: , Rfl:      lisinopril (ZESTRIL) 10 MG tablet, Take 1 tablet (10 mg) by mouth every 24 hours, Disp: 90 tablet, Rfl: 3     loratadine (CLARITIN) 10 MG tablet, Take by mouth every 24 hours, Disp: , Rfl:      metFORMIN (GLUCOPHAGE) 1000 MG tablet, TAKE 1 TABLET TWICE A DAY WITH MEALS, Disp: 180 tablet, Rfl: 0     Microlet Lancets MISC, USE TO TEST BLOOD SUGAR 2 TIMES DAILY OR AS DIRECTED., Disp: 100 each, Rfl: 1     multivitamin w/minerals (THERA-VIT-M) tablet, Take 1 tablet by mouth daily, Disp: , Rfl:      Probiotic Product (FORTIFY 30 BILLION PROBIOT 50+ PO), , Disp: , Rfl:      Semaglutide (RYBELSUS) 7 MG TABS, Take 7 mg by mouth daily, Disp: 90 tablet, Rfl: 3     tamsulosin (FLOMAX) 0.4 MG 24 hr capsule, Take 1 capsule by mouth daily., Disp: , Rfl:      torsemide (DEMADEX) 20 MG tablet, TAKE 1 TABLET BY MOUTH EVERY DAY, Disp: 90 tablet, Rfl:  2    Review of Systems - 10 point Review of Systems is negative except for right hallux ulcer which is noted in HPI.      OBJECTIVE:  /70   Pulse 86   Ht 1.829 m (6')   Wt 105.2 kg (232 lb)   SpO2 94%   BMI 31.46 kg/m    General appearance: Patient is alert and fully cooperative with history & exam.  No sign of distress is noted during the visit.    Vascular: Dorsalis pedis palpableRight.  Dermatologic:    Wound 10/14/18 Foot Abrasion(s) GRAYISH DISCOLORATION (Active)       Wound 12/07/18 Left;Lower Foot Amputation blackened area third left toe amputation incision site (Active)       Wound 07/27/20 Left Toe (Comment  which one) Ulceration Left foot pinky toe (Active)       Wound 07/27/20 Right Toe (Comment  which one) Ulceration Right great toe wound  (Active)       Wound Toe (Comment  which one) Ulceration (Active)       Wound Foot Ulceration (Active)       Wound Foot Ulceration (Active)       Rash 10/21/18 1543 Bilateral other (see comments) (Active)       Rash 07/27/20 2015 Left lower leg other (see comments) (Active)       VASC Wound right hallux (Active)   Pre Size Length 0.4 10/27/22 1200   Pre Size Width 0.9 10/27/22 1200   Pre Size Depth 0.2 10/27/22 1200   Pre Total Sq cm 0.36 10/27/22 1200   Post Size Length 1.2 10/27/22 1200   Post Size Width 0.5 10/27/22 1200   Post Size Depth 0.2 10/27/22 1200   Post Total Sq cm 0.6 10/27/22 1200   Undermined 0.4 @ 8o'clock 10/06/22 1300   Description calloused 10/27/22 1200       Incision/Surgical Site 10/15/18 Left Foot (Active)       Incision/Surgical Site 12/07/18 Right Knee (Active)   Granular base right hallux ulcer, no erythema.  Neurologic: Diminished to light touch Right.  Musculoskeletal: Contracted digits noted Right.    Imaging:     No results found.       Picture:

## 2023-02-01 ENCOUNTER — TRANSFERRED RECORDS (OUTPATIENT)
Dept: HEALTH INFORMATION MANAGEMENT | Facility: CLINIC | Age: 78
End: 2023-02-01

## 2023-02-02 DIAGNOSIS — E11.51 TYPE 2 DIABETES MELLITUS WITH PERIPHERAL VASCULAR DISEASE (H): ICD-10-CM

## 2023-02-03 ENCOUNTER — TRANSFERRED RECORDS (OUTPATIENT)
Dept: HEALTH INFORMATION MANAGEMENT | Facility: CLINIC | Age: 78
End: 2023-02-03

## 2023-02-06 ENCOUNTER — TELEPHONE (OUTPATIENT)
Dept: FAMILY MEDICINE | Facility: CLINIC | Age: 78
End: 2023-02-06
Payer: COMMERCIAL

## 2023-02-06 NOTE — TELEPHONE ENCOUNTER
Patient dropped off disability form sticker to have the covering provider for dr baltazar to complete . Form place in [facility] mail box.

## 2023-02-07 ENCOUNTER — TELEPHONE (OUTPATIENT)
Dept: VASCULAR SURGERY | Facility: CLINIC | Age: 78
End: 2023-02-07
Payer: COMMERCIAL

## 2023-02-07 DIAGNOSIS — E08.621 DIABETIC ULCER OF TOE OF RIGHT FOOT ASSOCIATED WITH DIABETES MELLITUS DUE TO UNDERLYING CONDITION, LIMITED TO BREAKDOWN OF SKIN (H): Primary | ICD-10-CM

## 2023-02-07 DIAGNOSIS — L97.511 DIABETIC ULCER OF TOE OF RIGHT FOOT ASSOCIATED WITH DIABETES MELLITUS DUE TO UNDERLYING CONDITION, LIMITED TO BREAKDOWN OF SKIN (H): Primary | ICD-10-CM

## 2023-02-07 NOTE — TELEPHONE ENCOUNTER
Caller: Lance    Provider: Dr. Salazar    Detailed reason for call: Patient states he would like to go ahead with the recommendation of a cam boot that was discussed previously.  Please place the order then call the patient to let him know he can go to [facility] to pick one up.     Best phone number to contact: [phone]    Best time to contact: any    Ok to leave a detailed message: yes    Ok to speak to authorized person if needed: no, he's the only one that answers his phone      (Noted to patient if reason is related to wound or incision, to please send a photo via email or [employer].)

## 2023-02-21 DIAGNOSIS — L97.509 TYPE 2 DIABETES MELLITUS WITH FOOT ULCER, WITHOUT LONG-TERM CURRENT USE OF INSULIN (H): ICD-10-CM

## 2023-02-21 DIAGNOSIS — E11.621 TYPE 2 DIABETES MELLITUS WITH FOOT ULCER, WITHOUT LONG-TERM CURRENT USE OF INSULIN (H): ICD-10-CM

## 2023-02-22 DIAGNOSIS — M10.9 GOUTY ARTHROPATHY: ICD-10-CM

## 2023-02-22 NOTE — TELEPHONE ENCOUNTER
"    Last Written Prescription Date:  12/10/22  Last Fill Quantity: 180,  # refills: 0   Last office visit provider:  12/13/22     Requested Prescriptions   Pending Prescriptions Disp Refills     metFORMIN (GLUCOPHAGE) 1000 MG tablet [Pharmacy Med Name: METFORMIN HCL TABS 1000MG] 180 tablet 3     Sig: TAKE 1 TABLET TWICE A DAY WITH MEALS       Biguanide Agents Passed - 2/21/2023 11:29 PM        Passed - Patient is age 10 or older        Passed - Patient has documented A1c within the specified period of time.     If HgbA1C is 8 or greater, it needs to be on file within the past 3 months.  If less than 8, must be on file within the past 6 months.     Recent Labs   Lab Test 09/09/22  1145   A1C 7.2*             Passed - Patient's CR is NOT>1.4 OR Patient's EGFR is NOT<45 within past 12 mos.     Recent Labs   Lab Test 08/29/22  0802 12/04/21  0852 12/03/21  1724 07/28/21  0337 04/29/21  1631   GFR  --   --  15  --   --    GFRESTIMATED 60*   < >  --    < > 59*   GFRESTBLACK  --   --   --   --  68    < > = values in this interval not displayed.       Recent Labs   Lab Test 08/29/22  0802 12/04/21  0852 12/03/21  1724   CR 1.24   < >  --    CRPOC  --   --  3.6*    < > = values in this interval not displayed.             Passed - Patient does NOT have a diagnosis of CHF.        Passed - Medication is active on med list        Passed - Recent (6 mo) or future (30 days) visit within the authorizing provider's specialty     Patient had office visit in the last 6 months or has a visit in the next 30 days with authorizing provider or within the authorizing provider's specialty.  See \"Patient Info\" tab in inbasket, or \"Choose Columns\" in Meds & Orders section of the refill encounter.                 Indigo De Los Santos RN 02/22/23 4:17 PM  "

## 2023-02-23 NOTE — TELEPHONE ENCOUNTER
02/23/23  Pt calling to check on status of form. He states that he has not heard back and his current handicap sticker expires at end of March. Please call pt with updates at [phone]  [place]

## 2023-02-23 NOTE — TELEPHONE ENCOUNTER
Spoke with patient, unable to locate form, current certificate good through end of march, ok to wait until Cate is back in clinic on monday

## 2023-02-24 RX ORDER — COLCHICINE 0.6 MG/1
TABLET ORAL
Qty: 90 TABLET | Refills: 3 | Status: ON HOLD | OUTPATIENT
Start: 2023-02-24 | End: 2024-08-16

## 2023-02-24 NOTE — TELEPHONE ENCOUNTER
There should be another provider covering Dr. Adamson today as I am out of the clinic.  Please see if the covering provider is willing to complete this.  Thanks.

## 2023-02-24 NOTE — TELEPHONE ENCOUNTER
"Routing refill request to provider for review/approval because:  Labs not current:  Uric acid    Last Written Prescription Date:  9/28/22  Last Fill Quantity: 90,  # refills: 1   Last office visit provider:  12/13/22     Requested Prescriptions   Pending Prescriptions Disp Refills     colchicine (COLCYRS) 0.6 MG tablet [Pharmacy Med Name: COLCHICINE TABS 0.6MG] 90 tablet 3     Sig: TAKE 1 TABLET DAILY       Gout Agents Protocol Failed - 2/22/2023 11:37 PM        Failed - Has Uric Acid on file in past 12 months and value is less than 6     Recent Labs   Lab Test 12/07/21  1209   URIC 5.7     If level is 6mg/dL or greater, ok to refill one time and refer to provider.           Passed - CBC on file in past 12 months     Recent Labs   Lab Test 09/29/22  0919   WBC 7.7   RBC 4.18*   HGB 11.7*   HCT 37.4*                    Passed - ALT on file in past 12 months     Recent Labs   Lab Test 06/16/22  0934   ALT 16             Passed - Recent (12 mo) or future (30 days) visit within the authorizing provider's specialty     Patient has had an office visit with the authorizing provider or a provider within the authorizing providers department within the previous 12 mos or has a future within next 30 days. See \"Patient Info\" tab in inbasket, or \"Choose Columns\" in Meds & Orders section of the refill encounter.              Passed - Medication is active on med list        Passed - Patient is age 18 or older        Passed - Normal serum creatinine on file in the past 12 months     Recent Labs   Lab Test 08/29/22  0802 12/04/21  0852 12/03/21  1724   CR 1.24   < >  --    CRPOC  --   --  3.6*    < > = values in this interval not displayed.       Ok to refill medication if creatinine is low               Indigo De Los Santos, RN 02/24/23 9:46 AM  "

## 2023-02-28 ENCOUNTER — OFFICE VISIT (OUTPATIENT)
Dept: VASCULAR SURGERY | Facility: CLINIC | Age: 78
End: 2023-02-28
Attending: PODIATRIST
Payer: COMMERCIAL

## 2023-02-28 VITALS — SYSTOLIC BLOOD PRESSURE: 142 MMHG | DIASTOLIC BLOOD PRESSURE: 78 MMHG | OXYGEN SATURATION: 94 % | HEART RATE: 82 BPM

## 2023-02-28 DIAGNOSIS — L97.511 DIABETIC ULCER OF TOE OF RIGHT FOOT ASSOCIATED WITH DIABETES MELLITUS DUE TO UNDERLYING CONDITION, LIMITED TO BREAKDOWN OF SKIN (H): Primary | ICD-10-CM

## 2023-02-28 DIAGNOSIS — E08.621 DIABETIC ULCER OF TOE OF RIGHT FOOT ASSOCIATED WITH DIABETES MELLITUS DUE TO UNDERLYING CONDITION, LIMITED TO BREAKDOWN OF SKIN (H): Primary | ICD-10-CM

## 2023-02-28 PROCEDURE — 11042 DBRDMT SUBQ TIS 1ST 20SQCM/<: CPT | Performed by: PODIATRIST

## 2023-02-28 ASSESSMENT — PAIN SCALES - GENERAL: PAINLEVEL: NO PAIN (0)

## 2023-02-28 NOTE — PROGRESS NOTES
FOOT AND ANKLE SURGERY/PODIATRY Progress Note      ASSESSMENT:   Diabetic Ulceration right hallux  Venous Stasis       TREATMENT:  -The right hallux ulcer is measuring similar to his previous visit. Again discussed that continued walking will delay wound healing and increase risk of infection.     -I recommend he continue with endoform and non-weight bearing with knee walker.     -After discussion of risk factors and consent obtained 2% Lidocaine HCL jelly was applied, under clean conditions, the right and foot ulceration(s) were debrided using #15 blade scalpel.  Devitalized and nonviable tissue, along with any fibrin and slough, was removed to improve granulation tissue formation, stimulate wound healing, decrease overall bacteria load, disrupt biofilm formation and decrease edge senescence. Wound drainage was scant No. Total excisional debridement was 0.48 sq cm into the subcutaneous tissue with a depth of 0.2 cm.   Ulcers were improved afterwards and .  Measures were as noted on the flow sheet. Collagen with a gauze dresssing was applied. He will continue to apply Collagen with a gauze dresssing as directed.     -He will follow-up in 3-4 weeks.    Nakul Salazar DPM  [facility] [place]      HPI: Lance Ibrahim was seen again today for a sore on the right hallux. He continues to walk on the right foot often using the cam boot but not at all times.    Past Medical History:   Diagnosis Date     Arthritis     osteoarthritis knees     BPH      CAD (coronary artery disease)     subtotal occlusion in the small distal LAD      Cardiomyopathy      Cerebral artery occlusion with cerebral infarction     TIA 1993, no residual     Chronic kidney disease      Chronic rhinitis      HTN (hypertension)      Hyperlipidemia      Kidney stone      PVD (peripheral vascular disease)      Sleep apnea     doesn't use cpap     TIA (transient ischaemic attack) 1993     Type 2 diabetes mellitus - 11/08/2018      "Ureteral stone        Past Surgical History:   Procedure Laterality Date     AMPUTATE TOE(S) Left 10/15/2018    Procedure: AMPUTATE TOE(S);  Left third toe amputation;  Surgeon: Ayaka Azevedo DPM, Podiatry/Foot and Ankle Surgery;  Location: [facility] OR     ARTHROPLASTY KNEE Right 12/07/2018    Procedure: Right total knee arthroplasty;  Surgeon: Issa Cunha MD;  Location: [facility] OR     COLONOSCOPY  03/09/2013    Procedure: COLONOSCOPY;  COLONOSCOPY;  Surgeon: Chau Hogan MD;  Location:  GI     CV HEART CATHETERIZATION WITH POSSIBLE INTERVENTION Left 03/05/2019    Procedure: Coronary Angiogram;  Surgeon: Nas Linda MD;  Location:  HEART CARDIAC CATH LAB     Diastasis recti repair  1985     EXTRACAPSULAR CATARACT EXTRATION WITH INTRAOCULAR LENS IMPLANT Left 03/13/2017     EXTRACAPSULAR CATARACT EXTRATION WITH INTRAOCULAR LENS IMPLANT Right      FOOT SURGERY  04/2013    cyst removal      HERNIA REPAIR  07/13/2004    ventral      ORIF Shoulder Left      Ventral hernia repair NOS  1987       Allergies   Allergen Reactions     Penicillins Anaphylaxis     8/25/22 - tolerated cefepime      Sulfa Drugs      \"itchy rash, swelling of face and hands\"     Ancef [Cefazolin] Rash         Current Outpatient Medications:      acetic acid 0.25 % irrigation, Irrigate with as directed daily Apply to gauze and let soak on wound for 5 minutes prior to dressing changes., Disp: 500 mL, Rfl: 3     amLODIPine (NORVASC) 5 MG tablet, TAKE 1 TABLET BY MOUTH EVERY DAY, Disp: 90 tablet, Rfl: 3     aspirin (ASA) 81 MG EC tablet, Take by mouth every 24 hours, Disp: , Rfl:      atorvastatin (LIPITOR) 40 MG tablet, Take 1 tablet (40 mg) by mouth every 24 hours, Disp: 90 tablet, Rfl: 3     blood glucose monitoring (NO BRAND SPECIFIED) meter device kit, Use to test blood sugar 2 times daily or as directed., Disp: 1 kit, Rfl: 0     colchicine (COLCYRS) 0.6 MG tablet, TAKE 1 TABLET DAILY, Disp: 90 tablet, Rfl: 3     CONTOUR NEXT TEST test " strip, USE TO TEST BLOOD SUGAR 2 TIMES DAILY OR AS DIRECTED., Disp: 100 strip, Rfl: 0     Ferrous Gluconate 240 (27 Fe) MG TABS, Take 1 tablet by mouth daily , Disp: , Rfl:      finasteride (PROSCAR) 5 MG tablet, Take by mouth every 24 hours, Disp: , Rfl:      fluticasone (FLONASE) 50 MCG/ACT nasal spray, Spray 1-2 sprays in nostril every 24 hours, Disp: , Rfl:      gabapentin (NEURONTIN) 300 MG capsule, Take 1 capsule (300 mg) by mouth 3 times daily, Disp: 270 capsule, Rfl: 0     isosorbide mononitrate (IMDUR) 30 MG 24 hr tablet, Take 1 tablet (30 mg) by mouth daily, Disp: 90 tablet, Rfl: 3     labetalol (NORMODYNE) 200 MG tablet, TAKE 1 TABLET BY MOUTH TWICE A DAY, Disp: 180 tablet, Rfl: 2     Lidocaine (LIDOCARE) 4 % Patch, Place 1 patch onto the skin every 24 hours To prevent lidocaine toxicity, patient should be patch free for 12 hrs daily. (Patient taking differently: Place 1 patch onto the skin every 24 hours To prevent lidocaine toxicity, patient should be patch free for 12 hrs daily.  To back every am), Disp: , Rfl:      lisinopril (ZESTRIL) 10 MG tablet, Take 1 tablet (10 mg) by mouth every 24 hours, Disp: 90 tablet, Rfl: 3     loratadine (CLARITIN) 10 MG tablet, Take by mouth every 24 hours, Disp: , Rfl:      metFORMIN (GLUCOPHAGE) 1000 MG tablet, TAKE 1 TABLET TWICE A DAY WITH MEALS, Disp: 180 tablet, Rfl: 0     Microlet Lancets MISC, USE TO TEST BLOOD SUGAR 2 TIMES DAILY OR AS DIRECTED., Disp: 100 each, Rfl: 1     multivitamin w/minerals (THERA-VIT-M) tablet, Take 1 tablet by mouth daily, Disp: , Rfl:      Probiotic Product (FORTIFY 30 BILLION PROBIOT 50+ PO), , Disp: , Rfl:      Semaglutide (RYBELSUS) 7 MG TABS, Take 7 mg by mouth daily, Disp: 90 tablet, Rfl: 3     tamsulosin (FLOMAX) 0.4 MG 24 hr capsule, Take 1 capsule by mouth daily., Disp: , Rfl:      torsemide (DEMADEX) 20 MG tablet, TAKE 1 TABLET BY MOUTH EVERY DAY, Disp: 90 tablet, Rfl: 2    Review of Systems - 10 point Review of Systems is  negative except for right hallux ulcer which is noted in HPI.      OBJECTIVE:  BP (!) 142/78   Pulse 82   SpO2 94%   General appearance: Patient is alert and fully cooperative with history & exam.  No sign of distress is noted during the visit.    Vascular: Dorsalis pedis palpableRight.  Dermatologic:    Wound 10/14/18 Foot Abrasion(s) GRAYISH DISCOLORATION (Active)       Wound 12/07/18 Left;Lower Foot Amputation blackened area third left toe amputation incision site (Active)       Wound 07/27/20 Left Toe (Comment  which one) Ulceration Left foot pinky toe (Active)       Wound 07/27/20 Right Toe (Comment  which one) Ulceration Right great toe wound  (Active)       Wound Toe (Comment  which one) Ulceration (Active)       Wound Foot Ulceration (Active)       Wound Foot Ulceration (Active)       Rash 10/21/18 1543 Bilateral other (see comments) (Active)       Rash 07/27/20 2015 Left lower leg other (see comments) (Active)       VASC Wound right hallux (Active)   Pre Size Length 0.4 10/27/22 1200   Pre Size Width 0.9 10/27/22 1200   Pre Size Depth 0.2 10/27/22 1200   Pre Total Sq cm 0.36 10/27/22 1200   Post Size Length 1.2 10/27/22 1200   Post Size Width 0.5 10/27/22 1200   Post Size Depth 0.2 10/27/22 1200   Post Total Sq cm 0.6 10/27/22 1200   Undermined 0.4 @ 8o'clock 10/06/22 1300   Description calloused 10/27/22 1200       Incision/Surgical Site 10/15/18 Left Foot (Active)       Incision/Surgical Site 12/07/18 Right Knee (Active)   Granular base right hallux ulcer, no erythema.  Neurologic: Diminished to light touch Right.  Musculoskeletal: Contracted digits noted Right.    Imaging:     No results found.       Picture:

## 2023-02-28 NOTE — PATIENT INSTRUCTIONS
Important lnstructions        WEIGHT BEARING STATUS: You are to remain NON WEIGHT BEARING on your right foot. NON WEIGHT BEARING MEANS NO PRESSURE ON YOUR FOOT OR HEEL AT ANY TIME FOR ANY REASON!    2. OFFLOADING DEVICE: Must use a A WHEELCHAIR at all times! (do not use affected foot to push wheelchair)      4. ELEVATE: Elevating your leg means laying with your head on a pillow and your foot ABOVE YOUR WAIST.     5. DO NOT MOVE YOUR FOOT.  There is a risk of worsening the wound or incision. To give yourself a higher chance of healing, please DO NOT swing foot back and forth and wiggle foot/toes especially when inside a stabilization device.        Dressing Change lnstructions            If you see green drainage from the wound- use the acetic acid. Otherwise no need to use.       - Dressing Application of  Endoform    1. Endoform should be cut to the size of the wound.  It should touch the edges of the ulcer. It is okay if it overlap the edges a small amount.  Then, moisten the Endoform with saline.(If it is easier for you, you can moisten it before laying it in the wound)    2. Cover the wound with Endoform, followed by dry gauze, and secure with roll gauze if needed.     3. Endoform is naturally used by the body over time so you may not find it in the wound when you change your dressing.  If you do find some, gently cleanse the wound with saline. Do not remove the remaining Endoform, which may appear as an off-white to diana gel, just add Endoform on top.  Usually, more Endoform will need to be added every 5-7 days.     4. Change your top dressing every 1-2 days or as needed depending on drainage.    -Endoform is a collagen dressing created from ovine (sheep) fore-stomach tissue. The collagen extracellular matrix transforms into a soft conforming sheet, which is naturally incorporated into the wound over time.  Collagen dressings are used to stimulate wound healing.   ,       It IS NOT ok to get your wound wet in  the bath or shower    SEEK MEDICAL CARE IF:  You have an increase in swelling, pain, or redness around the wound.  You have an increase in the amount of pus coming from the wound.  There is a bad smell coming from the wound.  The wound appears to be worsening/enlarging  You have a fever greater than 101.5 F      It is ok to continue current wound care treatment/products for the next 2-3 days until new wound care supplies are ordered and arrive. If longer than this please contact our office at [phone].        We want to hear from you!   In the next few weeks, you should receive a call or email to complete a survey about your visit at [facility]. Please help us improve your appointment experience by letting us know how we did today. We strive to make your experience good and value any ways in which we could do better.      We value your input and suggestions.    Thank you for choosing the [facility]!

## 2023-03-05 NOTE — DISCHARGE INSTRUCTIONS
Bilateral 5th toes, right dorsal foot and right great toe wounds: Daily    1. Cleanse with wound cleanser and dry  2. Apply Iodosorb gel to wounds  3. Cover with adaptic (cut to fit) and dry gauze              temporal

## 2023-03-07 DIAGNOSIS — I42.9 CARDIOMYOPATHY, UNSPECIFIED TYPE (H): ICD-10-CM

## 2023-03-07 DIAGNOSIS — I10 ESSENTIAL (PRIMARY) HYPERTENSION: ICD-10-CM

## 2023-03-07 DIAGNOSIS — R06.09 DYSPNEA ON EXERTION: ICD-10-CM

## 2023-03-07 DIAGNOSIS — R07.9 CHEST PAIN, UNSPECIFIED TYPE: ICD-10-CM

## 2023-03-07 RX ORDER — ISOSORBIDE MONONITRATE 30 MG/1
30 TABLET, EXTENDED RELEASE ORAL DAILY
Qty: 90 TABLET | Refills: 3 | Status: SHIPPED | OUTPATIENT
Start: 2023-03-07 | End: 2024-02-15

## 2023-03-07 RX ORDER — AMLODIPINE BESYLATE 5 MG/1
5 TABLET ORAL DAILY
Qty: 90 TABLET | Refills: 3 | Status: SHIPPED | OUTPATIENT
Start: 2023-03-07 | End: 2024-04-03

## 2023-03-07 NOTE — TELEPHONE ENCOUNTER
Pending Prescriptions:                       Disp   Refills    amLODIPine (NORVASC) 5 MG tablet          90 tab*3            Sig: Take 1 tablet (5 mg) by mouth daily    isosorbide mononitrate (IMDUR) 30 MG 24 h*90 tab*3            Sig: Take 1 tablet (30 mg) by mouth daily    Pt has 7 day supply left.

## 2023-03-09 ENCOUNTER — HOSPITAL ENCOUNTER (OUTPATIENT)
Dept: PHYSICAL THERAPY | Facility: REHABILITATION | Age: 78
Discharge: HOME | End: 2023-03-09
Payer: COMMERCIAL

## 2023-03-09 DIAGNOSIS — I89.0 LYMPHEDEMA: ICD-10-CM

## 2023-03-09 DIAGNOSIS — I87.8 VENOUS STASIS: Primary | ICD-10-CM

## 2023-03-09 PROCEDURE — 97140 MANUAL THERAPY 1/> REGIONS: CPT | Mod: GP | Performed by: PHYSICAL THERAPIST

## 2023-03-09 NOTE — PROGRESS NOTES
[facility]    OUTPATIENT PHYSICAL THERAPY  PLAN OF TREATMENT FOR OUTPATIENT REHABILITATION AND PROGRESS NOTE           Patient's Last Name, First Name, Lance Nguyễn Date of Birth  1945   Provider's Name  [facility] Medical Record No.  [id number]    Onset Date  8/25/2022 Start of Care Date  10/17/2022   Type:     _X_PT   ___OT   ___SLP Medical Diagnosis  Venous Stasis, Secondary Lymphedema   PT Diagnosis  Right LE lymphedema secondary to venous stasis, right LE surgery, multiple infections Plan of Treatment  Frequency/Duration: 1 time per week   Certification date from 12/26/2022 to 3/23/2023         Goals:  Goal Identifier swelling   Goal Description patient will reduce right arch of foot circumference to 26.5 or less demonstrating reduced foot swelling for improved healing   Target Date 01/13/23   Date Met      Progress (detail required for progress note):       Goal Identifier great toe   Goal Description patient will have right great toe ulcer healed for reduced infection risk   Target Date 01/13/23   Date Met      Progress (detail required for progress note):         Beginning/End Dates of Progress Note Reporting Period:  8/25/2022 to 12/26/2022    Progress Toward Goals: Patient had decrease in volume, but still with open sore.     Client Self (Subjective) Report for Progress Note Reporting Period: Patient continues to wear wraps for 4 days and then compression stockings in between sessions. he does continue to have the nurse visits every wednesday for wound care changes on his right great toe.           I CERTIFY THE NEED FOR THESE SERVICES FURNISHED UNDER        THIS PLAN OF TREATMENT AND WHILE UNDER MY CARE     (Physician co-signature of this document indicates review and certification of the therapy plan).                Referring Provider:   Martin Watts, PT

## 2023-03-09 NOTE — PROGRESS NOTES
[facility]    OUTPATIENT PHYSICAL THERAPY  PLAN OF TREATMENT FOR OUTPATIENT REHABILITATION AND PROGRESS NOTE             Patient's Last Name, First Name, Lance Nguyễn Date of Birth  1945   Provider's Name  [facility] Medical Record No.  [id number]    Onset Date  8/25/2022 Start of Care Date  10/17/2022   Type:     _X_PT   ___OT   ___SLP Medical Diagnosis  Venous Stasis, Secondary Lymphedema   PT Diagnosis  Right LE lymphedema secondary to venous stasis, right LE surgery, multiple infections Plan of Treatment  Frequency/Duration: 1 time per week   Certification date from 3/9/2023 to 6/3/2023       Goals:  Goal Identifier swelling   Goal Description patient will reduce right arch of foot circumference to 26.5 or less demonstrating reduced foot swelling for improved healing   Target Date 06/03/23   Date Met      Progress (detail required for progress note): NOT MET YET     Goal Identifier great toe   Goal Description patient will have right great toe ulcer healed for reduced infection risk   Target Date 06/03/23   Date Met      Progress (detail required for progress note): NOT MET YET         Beginning/End Dates of Progress Note Reporting Period:  12/26/2022 to 3/9/2023     Progress Toward Goals:   Progress this reporting period: limited progress withright great toe healing the past three months. Hasn't been  To PT for 3 months.      Client Self (Subjective) Report for Progress Note Reporting Period: Patient isglad to  be back  to PT to see if it can help improve the healing of right great toe wound.      Objective Measurements:   Objective Measure: LLIS: 12/72   Volume: see edema flowsheet           I CERTIFY THE NEED FOR THESE SERVICES FURNISHED UNDER        THIS PLAN OF TREATMENT AND WHILE UNDER MY CARE     (Physician co-signature of this document  indicates review and certification of the therapy plan).                Referring Provider: Dr. Martin Watts, PT

## 2023-03-09 NOTE — ADDENDUM NOTE
Encounter addended by: Tori Watts, PT on: 3/9/2023 8:22 AM   Actions taken: Clinical Note Signed, Document created, Document edited

## 2023-03-23 ENCOUNTER — HOSPITAL ENCOUNTER (OUTPATIENT)
Dept: PHYSICAL THERAPY | Facility: REHABILITATION | Age: 78
Discharge: HOME | End: 2023-03-23
Payer: COMMERCIAL

## 2023-03-23 DIAGNOSIS — I89.0 LYMPHEDEMA: ICD-10-CM

## 2023-03-23 DIAGNOSIS — I87.8 VENOUS STASIS: Primary | ICD-10-CM

## 2023-03-23 PROCEDURE — 97140 MANUAL THERAPY 1/> REGIONS: CPT | Mod: GP | Performed by: PHYSICAL THERAPIST

## 2023-03-23 NOTE — PROGRESS NOTES
03/23/23 0900   Signing Clinician's Name / Credentials   Signing clinician's name / credentials Tori Watts, PT   Session Number   Session Number 13 per med cert through 6/3/2022   Progress Note/Recertification   Progress Note Completed Date 03/09/23   Recertification Due Date 06/03/23   Recertification Completed Date  03/09/23   Adult Goals   Edema Eval Goals 1;2   Goal 1   Goal identifier swelling   Goal description patient will reduce right arch of foot circumference to 26.5 or less demonstrating reduced foot swelling for improved healing   Goal Progress NOT MET YET   Target date 06/03/23   Goal 2   Goal identifier great toe   Goal description patient will have right great toe ulcer healed for reduced infection risk   Goal Progress NOT MET YET   Target date 06/03/23   Subjective Report   Subjective Report Status quo. not much has changed. swelling goes up and  down and wound still present.   Objective Measures   Objective Measures Objective Measure 1   Objective Measure 1   Objective Measure LLIS: 12/72   Details Hip  measurement pre and post entre trial:  from 117.5cm to 118.5cm   Treatment Interventions   Interventions Manual Therapy;Self Care/Home Management   Manual Therapy   Manual Therapy: Mobilization, MFR, MLD, friction massage minutes ([id number]) 30   Skilled Intervention remeasured volume, MLD: focued on right LE softening techniques for lower leg swelling and dorsum of foot. Lotion applied to right LE foot to knee. Wrapped as follows: Comperm Size G foot to knee, rosidal (cut square to place on dorsum of foot), and wrapped rosidal foam from ankle to knee. short stretch wrap 8cmx5m foot to calf, [id number] foot to knee   Patient Response tolerates well   Treatment Detail shortened session d/t compression pump trial running into PT appt time. This was also appropriate  d/t the compression pump being so effective during today's session.   Equipment Needs   Equipment Needs Compression pump, had trial  of Entre and  Flexitouch on 3/23/2023   Plan   Plan for next session continue with MLD/softening to right LE, wrapping   Comments   Comments Patient has participated in >4 weeks of conservative care management of Lymphedema including compression, elevation, exercises(within his restrictions of being NWB with cam boot on) and MLD. Unfortunately, despite this conservative care, patient has symptoms of swelling and great toe wound that remain. Patient has undergone a compression pump trial on 3/23/2023 with [employer]s Basic pump, the Entre, for 20 minutes on low setting (19-30mmHg). Following this trial, patient had increase in hip swelling (from 117.5cm to 118.5cm). This means that the entre compression pump caused proximal pooling of fluid where the Entre Compression pump doesn't cover. So, the basic pump would not be recommended for her. It is recommended that patient pursue [employer]s flexitouch compression pump. The flexitouch clears trunk/proximal swelling prior to clearing the lower extremities. This would negate the proximal pooling of fluid patient experienced with the basic pump.   Total Session Time   Timed Code Treatment Minutes 30   Total Treatment Time (sum of timed and untimed services) 30   Medicare Claim Information   Medical diagnosis Venous stasis   Therapy diagnosis Right LE lymphedema secondary to venous stasis, right LE surgery, multiple infections   Start of care 10/17/22   Onset of illness / date of surgery 08/25/22   Certification date from 03/09/23

## 2023-03-24 ENCOUNTER — TRANSFERRED RECORDS (OUTPATIENT)
Dept: HEALTH INFORMATION MANAGEMENT | Facility: CLINIC | Age: 78
End: 2023-03-24

## 2023-03-26 ENCOUNTER — HEALTH MAINTENANCE LETTER (OUTPATIENT)
Age: 78
End: 2023-03-26

## 2023-04-04 ENCOUNTER — OFFICE VISIT (OUTPATIENT)
Dept: VASCULAR SURGERY | Facility: CLINIC | Age: 78
End: 2023-04-04
Attending: PODIATRIST
Payer: COMMERCIAL

## 2023-04-04 VITALS
BODY MASS INDEX: 31.42 KG/M2 | DIASTOLIC BLOOD PRESSURE: 58 MMHG | HEART RATE: 84 BPM | WEIGHT: 232 LBS | TEMPERATURE: 97.7 F | RESPIRATION RATE: 18 BRPM | HEIGHT: 72 IN | SYSTOLIC BLOOD PRESSURE: 100 MMHG

## 2023-04-04 DIAGNOSIS — I10 ESSENTIAL HYPERTENSION: ICD-10-CM

## 2023-04-04 DIAGNOSIS — E08.621 DIABETIC ULCER OF TOE OF RIGHT FOOT ASSOCIATED WITH DIABETES MELLITUS DUE TO UNDERLYING CONDITION, LIMITED TO BREAKDOWN OF SKIN (H): Primary | ICD-10-CM

## 2023-04-04 DIAGNOSIS — L97.511 DIABETIC ULCER OF TOE OF RIGHT FOOT ASSOCIATED WITH DIABETES MELLITUS DUE TO UNDERLYING CONDITION, LIMITED TO BREAKDOWN OF SKIN (H): Primary | ICD-10-CM

## 2023-04-04 PROCEDURE — 11042 DBRDMT SUBQ TIS 1ST 20SQCM/<: CPT | Performed by: PODIATRIST

## 2023-04-04 RX ORDER — TORSEMIDE 20 MG/1
20 TABLET ORAL DAILY
Qty: 90 TABLET | Refills: 2 | Status: SHIPPED | OUTPATIENT
Start: 2023-04-04 | End: 2024-04-08

## 2023-04-04 RX ORDER — OXYCODONE HYDROCHLORIDE 5 MG/1
TABLET ORAL
Status: ON HOLD | COMMUNITY
Start: 2023-03-01 | End: 2023-08-07

## 2023-04-04 ASSESSMENT — PAIN SCALES - GENERAL: PAINLEVEL: NO PAIN (0)

## 2023-04-04 NOTE — TELEPHONE ENCOUNTER
Pending Prescriptions:                       Disp   Refills    torsemide (DEMADEX) 20 MG tablet          90 tab*2            Sig: Take 1 tablet (20 mg) by mouth daily    Pt is almost out of medication - only has 4 pills left

## 2023-04-04 NOTE — PATIENT INSTRUCTIONS
Important lnstructions        WEIGHT BEARING STATUS: You are to remain NON WEIGHT BEARING on your right foot. NON WEIGHT BEARING MEANS NO PRESSURE ON YOUR FOOT OR HEEL AT ANY TIME FOR ANY REASON!    2. OFFLOADING DEVICE: Must use a A WHEELCHAIR at all times! (do not use affected foot to push wheelchair)      4. ELEVATE: Elevating your leg means laying with your head on a pillow and your foot ABOVE YOUR WAIST.     5. DO NOT MOVE YOUR FOOT.  There is a risk of worsening the wound or incision. To give yourself a higher chance of healing, please DO NOT swing foot back and forth and wiggle foot/toes especially when inside a stabilization device.        Dressing Change lnstructions            If you see green drainage from the wound- use the acetic acid. Otherwise no need to use.       - Dressing Application of  Endoform    1. Endoform should be cut to the size of the wound.  It should touch the edges of the ulcer. It is okay if it overlap the edges a small amount.  Then, moisten the Endoform with saline.(If it is easier for you, you can moisten it before laying it in the wound)    2. Cover the wound with Endoform, followed by dry gauze, and secure with roll gauze if needed.     3. Endoform is naturally used by the body over time so you may not find it in the wound when you change your dressing.  If you do find some, gently cleanse the wound with saline. Do not remove the remaining Endoform, which may appear as an off-white to diana gel, just add Endoform on top.  Usually, more Endoform will need to be added every 5-7 days.     4. Change your top dressing every 1-2 days or as needed depending on drainage.    -Endoform is a collagen dressing created from ovine (sheep) fore-stomach tissue. The collagen extracellular matrix transforms into a soft conforming sheet, which is naturally incorporated into the wound over time.  Collagen dressings are used to stimulate wound healing.   ,       It IS NOT ok to get your wound wet in  the bath or shower    SEEK MEDICAL CARE IF:  You have an increase in swelling, pain, or redness around the wound.  You have an increase in the amount of pus coming from the wound.  There is a bad smell coming from the wound.  The wound appears to be worsening/enlarging  You have a fever greater than 101.5 F      It is ok to continue current wound care treatment/products for the next 2-3 days until new wound care supplies are ordered and arrive. If longer than this please contact our office at [phone].        We want to hear from you!   In the next few weeks, you should receive a call or email to complete a survey about your visit at [facility]. Please help us improve your appointment experience by letting us know how we did today. We strive to make your experience good and value any ways in which we could do better.      We value your input and suggestions.    Thank you for choosing the [facility]!

## 2023-04-04 NOTE — PROGRESS NOTES
FOOT AND ANKLE SURGERY/PODIATRY Progress Note      ASSESSMENT:   Diabetic Ulceration right hallux  Venous Stasis         TREATMENT:  -I discussed with the patient that the right hallux ulceration is measuring slightly larger than the previous visit.  This is likely due to increased and excessive ambulation on the right foot due to recent move.    -Reviewed importance of nonweightbearing on the right foot is much as he is able to reduce stress along the right hallux ulceration.    -After discussion of risk factors and consent obtained 2% Lidocaine HCL jelly was applied, under clean conditions, the right hallux ulceration(s) were debrided using #15 blade scalpel.  Devitalized and nonviable tissue, along with any fibrin and slough, was removed to improve granulation tissue formation, stimulate wound healing, decrease overall bacteria load, disrupt biofilm formation and decrease edge senescence. Wound drainage was scant No. Total excisional debridement was 0.08 sq cm into the subcutaneous tissue with a depth of 0.2 cm.   Ulcers were improved afterwards and .  Measures were as noted on the flow sheet. Collagen with a gauze dresssing was applied. He will continue to apply Collagen with a gauze dresssing as directed.    -He will follow-up in 3 weeks    Nakul Salazar DPM  [facility] [place]      HPI: Lance Ibrahim was seen again today for a right hallux ulceration.  Patient relates that he recently moved to a new apartment and has been walking on his right foot more than in the past.    Past Medical History:   Diagnosis Date     Arthritis     osteoarthritis knees     BPH      CAD (coronary artery disease)     subtotal occlusion in the small distal LAD      Cardiomyopathy      Cerebral artery occlusion with cerebral infarction     TIA 1993, no residual     Chronic kidney disease      Chronic rhinitis      HTN (hypertension)      Hyperlipidemia      Kidney stone      PVD (peripheral vascular disease)   "    Sleep apnea     doesn't use cpap     TIA (transient ischaemic attack) 1993     Type 2 diabetes mellitus - 11/08/2018     Ureteral stone        Past Surgical History:   Procedure Laterality Date     AMPUTATE TOE(S) Left 10/15/2018    Procedure: AMPUTATE TOE(S);  Left third toe amputation;  Surgeon: Ayaka Azevedo DPM, Podiatry/Foot and Ankle Surgery;  Location: [facility] OR     ARTHROPLASTY KNEE Right 12/07/2018    Procedure: Right total knee arthroplasty;  Surgeon: Issa Cunha MD;  Location: [facility] OR     COLONOSCOPY  03/09/2013    Procedure: COLONOSCOPY;  COLONOSCOPY;  Surgeon: Chau Hogan MD;  Location:  GI     CV HEART CATHETERIZATION WITH POSSIBLE INTERVENTION Left 03/05/2019    Procedure: Coronary Angiogram;  Surgeon: Nas Linda MD;  Location:  HEART CARDIAC CATH LAB     Diastasis recti repair  1985     EXTRACAPSULAR CATARACT EXTRATION WITH INTRAOCULAR LENS IMPLANT Left 03/13/2017     EXTRACAPSULAR CATARACT EXTRATION WITH INTRAOCULAR LENS IMPLANT Right      FOOT SURGERY  04/2013    cyst removal      HERNIA REPAIR  07/13/2004    ventral      ORIF Shoulder Left      Ventral hernia repair NOS  1987       Allergies   Allergen Reactions     Penicillins Anaphylaxis     8/25/22 - tolerated cefepime      Sulfa Drugs      \"itchy rash, swelling of face and hands\"     Ancef [Cefazolin] Rash         Current Outpatient Medications:      acetic acid 0.25 % irrigation, Irrigate with as directed daily Apply to gauze and let soak on wound for 5 minutes prior to dressing changes., Disp: 500 mL, Rfl: 3     amLODIPine (NORVASC) 5 MG tablet, Take 1 tablet (5 mg) by mouth daily, Disp: 90 tablet, Rfl: 3     aspirin (ASA) 81 MG EC tablet, Take by mouth every 24 hours, Disp: , Rfl:      atorvastatin (LIPITOR) 40 MG tablet, Take 1 tablet (40 mg) by mouth every 24 hours, Disp: 90 tablet, Rfl: 3     blood glucose monitoring (NO BRAND SPECIFIED) meter device kit, Use to test blood sugar 2 times daily or as directed., " Disp: 1 kit, Rfl: 0     colchicine (COLCYRS) 0.6 MG tablet, TAKE 1 TABLET DAILY, Disp: 90 tablet, Rfl: 3     CONTOUR NEXT TEST test strip, USE TO TEST BLOOD SUGAR 2 TIMES DAILY OR AS DIRECTED., Disp: 100 strip, Rfl: 0     Ferrous Gluconate 240 (27 Fe) MG TABS, Take 1 tablet by mouth daily , Disp: , Rfl:      finasteride (PROSCAR) 5 MG tablet, Take by mouth every 24 hours, Disp: , Rfl:      fluticasone (FLONASE) 50 MCG/ACT nasal spray, Spray 1-2 sprays in nostril every 24 hours, Disp: , Rfl:      gabapentin (NEURONTIN) 300 MG capsule, Take 1 capsule (300 mg) by mouth 3 times daily, Disp: 270 capsule, Rfl: 0     isosorbide mononitrate (IMDUR) 30 MG 24 hr tablet, Take 1 tablet (30 mg) by mouth daily, Disp: 90 tablet, Rfl: 3     labetalol (NORMODYNE) 200 MG tablet, TAKE 1 TABLET BY MOUTH TWICE A DAY, Disp: 180 tablet, Rfl: 2     Lidocaine (LIDOCARE) 4 % Patch, Place 1 patch onto the skin every 24 hours To prevent lidocaine toxicity, patient should be patch free for 12 hrs daily. (Patient taking differently: Place 1 patch onto the skin every 24 hours To prevent lidocaine toxicity, patient should be patch free for 12 hrs daily.  To back every am), Disp: , Rfl:      lisinopril (ZESTRIL) 10 MG tablet, Take 1 tablet (10 mg) by mouth every 24 hours, Disp: 90 tablet, Rfl: 3     loratadine (CLARITIN) 10 MG tablet, Take by mouth every 24 hours, Disp: , Rfl:      metFORMIN (GLUCOPHAGE) 1000 MG tablet, TAKE 1 TABLET TWICE A DAY WITH MEALS, Disp: 180 tablet, Rfl: 0     Microlet Lancets MISC, USE TO TEST BLOOD SUGAR 2 TIMES DAILY OR AS DIRECTED., Disp: 100 each, Rfl: 1     multivitamin w/minerals (THERA-VIT-M) tablet, Take 1 tablet by mouth daily, Disp: , Rfl:      oxyCODONE (ROXICODONE) 5 MG tablet, TAKE 1 TABLET BY ORAL ROUTE EVERY 4 - 6 HOURS AS NEEDED FOR ACUTE PAIN., Disp: , Rfl:      Probiotic Product (FORTIFY 30 BILLION PROBIOT 50+ PO), , Disp: , Rfl:      Semaglutide (RYBELSUS) 7 MG TABS, Take 7 mg by mouth daily, Disp: 90  tablet, Rfl: 3     tamsulosin (FLOMAX) 0.4 MG 24 hr capsule, Take 1 capsule by mouth daily., Disp: , Rfl:      torsemide (DEMADEX) 20 MG tablet, Take 1 tablet (20 mg) by mouth daily, Disp: 90 tablet, Rfl: 2    Review of Systems - 10 point Review of Systems is negative except for right hallux ulcer which is noted in HPI.      OBJECTIVE:  /58   Pulse 84   Temp 97.7  F (36.5  C)   Resp 18   Ht 6' (1.829 m)   Wt 232 lb (105.2 kg)   BMI 31.46 kg/m    General appearance: Patient is alert and fully cooperative with history & exam.  No sign of distress is noted during the visit.    Vascular: Dorsalis pedis non-palpableRight.  Dermatologic:    Wound 10/14/18 Foot Abrasion(s) GRAYISH DISCOLORATION (Active)       Wound 12/07/18 Left;Lower Foot Amputation blackened area third left toe amputation incision site (Active)       Wound 07/27/20 Left Toe (Comment  which one) Ulceration Left foot pinky toe (Active)       Wound 07/27/20 Right Toe (Comment  which one) Ulceration Right great toe wound  (Active)       Wound Toe (Comment  which one) Ulceration (Active)       Wound Foot Ulceration (Active)       Wound Foot Ulceration (Active)       Rash 10/21/18 1543 Bilateral other (see comments) (Active)       Rash 07/27/20 2015 Left lower leg other (see comments) (Active)       VASC Wound right hallux (Active)   Pre Size Length 0.8 04/04/23 1517   Pre Size Width 0.1 04/04/23 1517   Pre Size Depth 0.4 04/04/23 1517   Pre Total Sq cm 0.06 11/17/22 1200   Post Size Length 1.5 04/04/23 1517   Post Size Width 0.5 04/04/23 1517   Post Size Depth 0.2 04/04/23 1517   Post Total Sq cm 0.75 04/04/23 1517   Undermined 0.4 @ 8o'clock 10/06/22 1300   Description callous 04/04/23 1517       Incision/Surgical Site 10/15/18 Left Foot (Active)       Incision/Surgical Site 12/07/18 Right Knee (Active)   Granular base right hallux ulceration, no erythema noted.  Neurologic: Diminished to light touch Right.  Musculoskeletal: Contracted digits  noted Right.    Imaging:     No results found.       Picture:

## 2023-04-06 ENCOUNTER — HOSPITAL ENCOUNTER (OUTPATIENT)
Dept: PHYSICAL THERAPY | Facility: REHABILITATION | Age: 78
Discharge: HOME | End: 2023-04-06
Payer: COMMERCIAL

## 2023-04-06 DIAGNOSIS — E08.621 DIABETIC ULCER OF TOE OF RIGHT FOOT ASSOCIATED WITH DIABETES MELLITUS DUE TO UNDERLYING CONDITION, LIMITED TO BREAKDOWN OF SKIN (H): ICD-10-CM

## 2023-04-06 DIAGNOSIS — I87.8 VENOUS STASIS: Primary | ICD-10-CM

## 2023-04-06 DIAGNOSIS — I89.0 LYMPHEDEMA: ICD-10-CM

## 2023-04-06 DIAGNOSIS — L03.115 CELLULITIS OF RIGHT LOWER EXTREMITY: ICD-10-CM

## 2023-04-06 DIAGNOSIS — L97.511 DIABETIC ULCER OF TOE OF RIGHT FOOT ASSOCIATED WITH DIABETES MELLITUS DUE TO UNDERLYING CONDITION, LIMITED TO BREAKDOWN OF SKIN (H): ICD-10-CM

## 2023-04-06 PROCEDURE — 97140 MANUAL THERAPY 1/> REGIONS: CPT | Mod: GP | Performed by: PHYSICAL THERAPIST

## 2023-04-06 PROCEDURE — 97535 SELF CARE MNGMENT TRAINING: CPT | Mod: GP | Performed by: PHYSICAL THERAPIST

## 2023-04-08 ENCOUNTER — MYC MEDICAL ADVICE (OUTPATIENT)
Dept: FAMILY MEDICINE | Facility: CLINIC | Age: 78
End: 2023-04-08
Payer: COMMERCIAL

## 2023-04-08 DIAGNOSIS — E11.41 DIABETIC MONONEUROPATHY ASSOCIATED WITH TYPE 2 DIABETES MELLITUS (H): ICD-10-CM

## 2023-04-10 RX ORDER — GABAPENTIN 300 MG/1
300 CAPSULE ORAL 3 TIMES DAILY
Qty: 270 CAPSULE | Refills: 0 | Status: SHIPPED | OUTPATIENT
Start: 2023-04-10 | End: 2023-04-12

## 2023-04-11 DIAGNOSIS — E11.41 DIABETIC MONONEUROPATHY ASSOCIATED WITH TYPE 2 DIABETES MELLITUS (H): ICD-10-CM

## 2023-04-11 NOTE — TELEPHONE ENCOUNTER
Medication last filled:4/10/2023    Last clinic visit: 04/04/2023     Clinic visit frequency required: Q 6  months    Next clinic visit: 04/13/2023    Controlled substance agreement on file: No.    Urine Drug Screen on file:  No

## 2023-04-12 RX ORDER — GABAPENTIN 300 MG/1
300 CAPSULE ORAL 3 TIMES DAILY
Qty: 270 CAPSULE | Refills: 0 | Status: SHIPPED | OUTPATIENT
Start: 2023-04-12 | End: 2023-09-29

## 2023-04-13 ENCOUNTER — HOSPITAL ENCOUNTER (OUTPATIENT)
Dept: PHYSICAL THERAPY | Facility: REHABILITATION | Age: 78
Discharge: HOME | End: 2023-04-13
Payer: COMMERCIAL

## 2023-04-13 DIAGNOSIS — I89.0 LYMPHEDEMA: ICD-10-CM

## 2023-04-13 DIAGNOSIS — L03.115 CELLULITIS OF RIGHT LOWER EXTREMITY: ICD-10-CM

## 2023-04-13 DIAGNOSIS — E08.621 DIABETIC ULCER OF TOE OF RIGHT FOOT ASSOCIATED WITH DIABETES MELLITUS DUE TO UNDERLYING CONDITION, LIMITED TO BREAKDOWN OF SKIN (H): ICD-10-CM

## 2023-04-13 DIAGNOSIS — L97.511 DIABETIC ULCER OF TOE OF RIGHT FOOT ASSOCIATED WITH DIABETES MELLITUS DUE TO UNDERLYING CONDITION, LIMITED TO BREAKDOWN OF SKIN (H): ICD-10-CM

## 2023-04-13 DIAGNOSIS — I87.8 VENOUS STASIS: Primary | ICD-10-CM

## 2023-04-13 PROBLEM — B99.9: Status: ACTIVE | Noted: 2022-10-14

## 2023-04-13 PROBLEM — M79.10 MUSCLE PAIN: Status: ACTIVE | Noted: 2022-12-19

## 2023-04-13 PROBLEM — R35.1 NOCTURIA: Status: ACTIVE | Noted: 2023-02-01

## 2023-04-13 PROBLEM — E11.40 DIABETIC NEUROPATHY ASSOCIATED WITH TYPE 2 DIABETES MELLITUS (H): Status: ACTIVE | Noted: 2022-10-17

## 2023-04-13 PROBLEM — R39.198 SLOWING OF URINARY STREAM: Status: ACTIVE | Noted: 2023-02-01

## 2023-04-13 PROCEDURE — 97140 MANUAL THERAPY 1/> REGIONS: CPT | Mod: GP | Performed by: PHYSICAL THERAPIST

## 2023-04-17 ENCOUNTER — HOSPITAL ENCOUNTER (OUTPATIENT)
Dept: PHYSICAL THERAPY | Facility: REHABILITATION | Age: 78
Discharge: HOME | End: 2023-04-17
Payer: COMMERCIAL

## 2023-04-17 DIAGNOSIS — I87.8 VENOUS STASIS: Primary | ICD-10-CM

## 2023-04-17 DIAGNOSIS — I89.0 LYMPHEDEMA: ICD-10-CM

## 2023-04-17 DIAGNOSIS — L03.115 CELLULITIS OF RIGHT LOWER EXTREMITY: ICD-10-CM

## 2023-04-17 DIAGNOSIS — L97.511 DIABETIC ULCER OF TOE OF RIGHT FOOT ASSOCIATED WITH DIABETES MELLITUS DUE TO UNDERLYING CONDITION, LIMITED TO BREAKDOWN OF SKIN (H): ICD-10-CM

## 2023-04-17 DIAGNOSIS — E08.621 DIABETIC ULCER OF TOE OF RIGHT FOOT ASSOCIATED WITH DIABETES MELLITUS DUE TO UNDERLYING CONDITION, LIMITED TO BREAKDOWN OF SKIN (H): ICD-10-CM

## 2023-04-17 PROCEDURE — 97140 MANUAL THERAPY 1/> REGIONS: CPT | Mod: GP | Performed by: PHYSICAL THERAPIST

## 2023-04-19 ENCOUNTER — OFFICE VISIT (OUTPATIENT)
Dept: FAMILY MEDICINE | Facility: CLINIC | Age: 78
End: 2023-04-19
Payer: COMMERCIAL

## 2023-04-19 VITALS
DIASTOLIC BLOOD PRESSURE: 68 MMHG | WEIGHT: 240.7 LBS | OXYGEN SATURATION: 99 % | HEART RATE: 82 BPM | TEMPERATURE: 98 F | HEIGHT: 72 IN | SYSTOLIC BLOOD PRESSURE: 135 MMHG | BODY MASS INDEX: 32.6 KG/M2

## 2023-04-19 DIAGNOSIS — Z01.818 PRE-OP EXAM: Primary | ICD-10-CM

## 2023-04-19 LAB
ERYTHROCYTE [DISTWIDTH] IN BLOOD BY AUTOMATED COUNT: 15.7 % (ref 10–15)
HCT VFR BLD AUTO: 39.5 % (ref 40–53)
HGB BLD-MCNC: 12.5 G/DL (ref 13.3–17.7)
HOLD SPECIMEN: NORMAL
MCH RBC QN AUTO: 29.9 PG (ref 26.5–33)
MCHC RBC AUTO-ENTMCNC: 31.6 G/DL (ref 31.5–36.5)
MCV RBC AUTO: 95 FL (ref 78–100)
PLATELET # BLD AUTO: 248 10E3/UL (ref 150–450)
POTASSIUM SERPL-SCNC: 5 MMOL/L (ref 3.4–5.3)
RBC # BLD AUTO: 4.18 10E6/UL (ref 4.4–5.9)
WBC # BLD AUTO: 7.1 10E3/UL (ref 4–11)

## 2023-04-19 PROCEDURE — 84132 ASSAY OF SERUM POTASSIUM: CPT | Performed by: FAMILY MEDICINE

## 2023-04-19 PROCEDURE — 99214 OFFICE O/P EST MOD 30 MIN: CPT | Performed by: FAMILY MEDICINE

## 2023-04-19 PROCEDURE — 85027 COMPLETE CBC AUTOMATED: CPT | Performed by: FAMILY MEDICINE

## 2023-04-19 PROCEDURE — 36415 COLL VENOUS BLD VENIPUNCTURE: CPT | Performed by: FAMILY MEDICINE

## 2023-04-19 ASSESSMENT — PAIN SCALES - GENERAL: PAINLEVEL: SEVERE PAIN (6)

## 2023-04-19 NOTE — PROGRESS NOTES
[facility]  [address]  [facility] [address]  Phone: [phone]  Fax: [phone]  Primary Provider: Yemi Adamson  Pre-op Performing Provider: YEMI ADAMSON      PREOPERATIVE EVALUATION:  Today's date: 4/19/2023    Lance Ibrahim is a 78 year old male who presents for a preoperative evaluation.      12/13/2022     9:41 AM   Additional Questions   Roomed by Elicia     Surgical Information:  Surgery/Procedure: RFA- RADIO FREQUENCY ABLATION SPINE  Surgery Location:  [facility]   Surgeon: DR. Martin  Surgery Date: 04/25/2023  Time of Surgery: 10:00am  Where patient plans to recover: At home with family  Fax number for surgical facility: Patient will hand carry a hard copy of this preoperative exam    Assessment & Plan     The proposed surgical procedure is considered LOW risk.    CKD (chronic kidney disease) stage 3, GFR 30-59 ml/min (H)  Currently stable no contraindication to anticipated radiofrequency surgery to spine    Diabetic neuropathy associated with type 2 diabetes mellitus (H)  Excellent control with Rybelsus    Pre-op exam  Following will be done and reviewed today for surgery next week  - CBC with platelets  - Potassium        Possible Sleep Apnea: No              Medications: Patient may take his medications the day of surgery as before    RECOMMENDATION:  APPROVAL GIVEN to proceed with proposed procedure pending review of diagnostic evaluation.            Subjective     HPI related to upcoming procedure: Lance is scheduled for radiofrequency surgery of his lumbar spine for recurrent lumbosacral radicular pain.  Patient had this procedure 10 years ago and has done quite well but subsequent to neronal growth and degeneration has developed recurrent discomfort in his lumbosacral region.  He has had a corticosteroid injection without relief.  Patient has had peripheral vascular disease and a toe amputation due to diabetes mellitus.  Currently his  diabetes control is excellent under a new medication Rybelsus under control here.  Patient is due for his routine diabetes check next week but we anticipate no problems.  Patient's been therapeutically optimized for the anticipated procedure under any form of anesthesia including local and mild to moderate IV sedation.  Pain control after his surgery will be managed by orthopedic/pain surgery.  All medical questions asked were answered I personally reviewed family social history surgeries allergies problems list        4/13/2023     3:05 PM   Preop Questions   1. Have you ever had a heart attack or stroke? YES -    2. Have you ever had surgery on your heart or blood vessels, such as a stent placement, a coronary artery bypass, or surgery on an artery in your head, neck, heart, or legs? YES -    3. Do you have chest pain with activity? No   4. Do you have a history of  heart failure? No   5. Do you currently have a cold, bronchitis or symptoms of other infection? No   6. Do you have a cough, shortness of breath, or wheezing? No   7. Do you or anyone in your family have previous history of blood clots? No   8. Do you or does anyone in your family have a serious bleeding problem such as prolonged bleeding following surgeries or cuts? No   9. Have you ever had problems with anemia or been told to take iron pills? YES -    10. Have you had any abnormal blood loss such as black, tarry or bloody stools? No   11. Have you ever had a blood transfusion? No   12. Are you willing to have a blood transfusion if it is medically needed before, during, or after your surgery? Yes   13. Have you or any of your relatives ever had problems with anesthesia? No   14. Do you have sleep apnea, excessive snoring or daytime drowsiness? YES -    14a. Do you have a CPAP machine? No   15. Do you have any artifical heart valves or other implanted medical devices like a pacemaker, defibrillator, or continuous glucose monitor? No   16. Do you have  artificial joints? YES -    17. Are you allergic to latex? No       Health Care Directive:  Patient has a Health Care Directive on file      Preoperative Review of :   reviewed - no record of controlled substances prescribed.          Review of Systems  CONSTITUTIONAL: NEGATIVE for fever, chills, change in weight  INTEGUMENTARY/SKIN: NEGATIVE for worrisome rashes, moles or lesions  EYES: NEGATIVE for vision changes or irritation  ENT/MOUTH: NEGATIVE for ear, mouth and throat problems  RESP: NEGATIVE for significant cough or SOB  CV: NEGATIVE for chest pain, palpitations or peripheral edema  GI: NEGATIVE for nausea, abdominal pain, heartburn, or change in bowel habits  : NEGATIVE for frequency, dysuria, or hematuria  MUSCULOSKELETAL: NEGATIVE for significant arthralgias or myalgia  NEURO: NEGATIVE for weakness, dizziness or paresthesias  ENDOCRINE: NEGATIVE for temperature intolerance, skin/hair changes  HEME: NEGATIVE for bleeding problems  PSYCHIATRIC: NEGATIVE for changes in mood or affect    Patient Active Problem List    Diagnosis Date Noted     Type 2 diabetes, HbA1c goal < 7% (H) 02/22/2013     Priority: High     Hypertension 07/06/2008     Priority: High     Transient cerebral ischemia 07/09/2004     Priority: High     Problem list name updated by automated process. 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) 10/17/2022     Priority: Medium     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 10/14/2022     Priority: Medium     Cellulitis of right lower extremity 08/25/2022     Priority: Medium     H/O amputation of lesser toe, left (H) 01/26/2022      Priority: Medium     Acute idiopathic gout of right hand 12/27/2021     Priority: Medium     Other constipation 12/09/2021     Priority: Medium     Acute renal insufficiency 12/03/2021     Priority: Medium     Acute right-sided thoracic back pain 12/03/2021     Priority: Medium     Ulcerated, foot, unspecified laterality, limited to breakdown of skin (H) 07/28/2021     Priority: Medium     Ulcerated, foot (H) 07/27/2020     Priority: Medium     Obesity (BMI 35.0-39.9) with comorbidity (H) 02/05/2020     Priority: Medium     CKD (chronic kidney disease) stage 3, GFR 30-59 ml/min (H) 02/05/2020     Priority: Medium     Shortness of breath 03/04/2019     Priority: Medium     Added automatically from request for surgery [id number]       Renal colic 03/04/2019     Priority: Medium     Dyspnea on exertion 02/22/2019     Priority: Medium     Added automatically from request for surgery [id number]       Chest pain, unspecified type 02/22/2019     Priority: Medium     Added automatically from request for surgery [id number]       Cardiomyopathy, unspecified type (H) 02/22/2019     Priority: Medium     Added automatically from request for surgery [id number]       Skin rash 12/08/2018     Priority: Medium     Total knee replacement status 12/07/2018     Priority: Medium     Amputated toe, left (H) 11/08/2018     Priority: Medium     Cervicalgia 02/24/2017     Priority: Medium     Lower urinary tract symptoms 04/07/2016     Priority: Medium     Type II diabetes mellitus (H) 07/09/2013     Priority: Medium     Gouty arthropathy 07/09/2013     Priority: Medium     CARDIOVASCULAR SCREENING; LDL GOAL LESS THAN 100 10/31/2010     Priority: Medium     Hypertrophy of prostate with urinary obstruction 07/09/2004     Priority: Medium     Problem list name updated by automated process. Provider to review       Ventral hernia 06/28/2004     Priority: Medium     Problem list name updated by automated process. Provider to review       Chronic low back pain  01/01/2000     Priority: Medium     Gout 07/20/2012     Priority: Low     Chronic rhinitis 07/09/2004     Priority: Low      Past Medical History:   Diagnosis Date     Arthritis     osteoarthritis knees     BPH      CAD (coronary artery disease)     subtotal occlusion in the small distal LAD      Cardiomyopathy      Cerebral artery occlusion with cerebral infarction     TIA 1993, no residual     Chronic kidney disease      Chronic rhinitis      HTN (hypertension)      Hyperlipidemia      Kidney stone      PVD (peripheral vascular disease)      Sleep apnea     doesn't use cpap     TIA (transient ischaemic attack) 1993     Type 2 diabetes mellitus - 11/08/2018     Ureteral stone      Past Surgical History:   Procedure Laterality Date     AMPUTATE TOE(S) Left 10/15/2018    Procedure: AMPUTATE TOE(S);  Left third toe amputation;  Surgeon: Ayaka Azevedo DPM, Podiatry/Foot and Ankle Surgery;  Location:  OR     ARTHROPLASTY KNEE Right 12/07/2018    Procedure: Right total knee arthroplasty;  Surgeon: Issa Cunha MD;  Location:  OR     COLONOSCOPY  03/09/2013    Procedure: COLONOSCOPY;  COLONOSCOPY;  Surgeon: Chau Hogan MD;  Location:  GI     CV HEART CATHETERIZATION WITH POSSIBLE INTERVENTION Left 03/05/2019    Procedure: Coronary Angiogram;  Surgeon: Nas Linda MD;  Location:  HEART CARDIAC CATH LAB     Diastasis recti repair  1985     EXTRACAPSULAR CATARACT EXTRATION WITH INTRAOCULAR LENS IMPLANT Left 03/13/2017     EXTRACAPSULAR CATARACT EXTRATION WITH INTRAOCULAR LENS IMPLANT Right      FOOT SURGERY  04/2013    cyst removal      HERNIA REPAIR  07/13/2004    ventral      ORIF Shoulder Left      Ventral hernia repair NOS  1987     Current Outpatient Medications   Medication Sig Dispense Refill     acetic acid 0.25 % irrigation Irrigate with as directed daily Apply to gauze and let soak on wound for 5 minutes prior to dressing changes. 500 mL 3     amLODIPine (NORVASC) 5 MG tablet Take 1  tablet (5 mg) by mouth daily 90 tablet 3     aspirin (ASA) 81 MG EC tablet Take by mouth every 24 hours       atorvastatin (LIPITOR) 40 MG tablet Take 1 tablet (40 mg) by mouth every 24 hours 90 tablet 3     blood glucose monitoring (NO BRAND SPECIFIED) meter device kit Use to test blood sugar 2 times daily or as directed. 1 kit 0     colchicine (COLCYRS) 0.6 MG tablet TAKE 1 TABLET DAILY 90 tablet 3     CONTOUR NEXT TEST test strip USE TO TEST BLOOD SUGAR 2 TIMES DAILY OR AS DIRECTED. 100 strip 0     Ferrous Gluconate 240 (27 Fe) MG TABS Take 1 tablet by mouth daily        finasteride (PROSCAR) 5 MG tablet Take by mouth every 24 hours       fluticasone (FLONASE) 50 MCG/ACT nasal spray Spray 1-2 sprays in nostril every 24 hours       gabapentin (NEURONTIN) 300 MG capsule Take 1 capsule (300 mg) by mouth 3 times daily 270 capsule 0     isosorbide mononitrate (IMDUR) 30 MG 24 hr tablet Take 1 tablet (30 mg) by mouth daily 90 tablet 3     labetalol (NORMODYNE) 200 MG tablet TAKE 1 TABLET BY MOUTH TWICE A  tablet 2     Lidocaine (LIDOCARE) 4 % Patch Place 1 patch onto the skin every 24 hours To prevent lidocaine toxicity, patient should be patch free for 12 hrs daily. (Patient taking differently: Place 1 patch onto the skin every 24 hours To prevent lidocaine toxicity, patient should be patch free for 12 hrs daily.  To back every am)       lisinopril (ZESTRIL) 10 MG tablet Take 1 tablet (10 mg) by mouth every 24 hours 90 tablet 3     loratadine (CLARITIN) 10 MG tablet Take by mouth every 24 hours       metFORMIN (GLUCOPHAGE) 1000 MG tablet TAKE 1 TABLET TWICE A DAY WITH MEALS 180 tablet 0     Microlet Lancets MISC USE TO TEST BLOOD SUGAR 2 TIMES DAILY OR AS DIRECTED. 100 each 1     multivitamin w/minerals (THERA-VIT-M) tablet Take 1 tablet by mouth daily       oxyCODONE (ROXICODONE) 5 MG tablet TAKE 1 TABLET BY ORAL ROUTE EVERY 4 - 6 HOURS AS NEEDED FOR ACUTE PAIN.       Probiotic Product (FORTIFY 30 BILLION  "PROBIOT 50+ PO)        Semaglutide (RYBELSUS) 7 MG TABS Take 7 mg by mouth daily 90 tablet 3     tamsulosin (FLOMAX) 0.4 MG 24 hr capsule Take 1 capsule by mouth daily.       torsemide (DEMADEX) 20 MG tablet Take 1 tablet (20 mg) by mouth daily 90 tablet 2       Allergies   Allergen Reactions     Penicillins Anaphylaxis     22 - tolerated cefepime      Sulfa Drugs      \"itchy rash, swelling of face and hands\"     Ancef [Cefazolin] Rash        Social History     Tobacco Use     Smoking status: Former     Packs/day: 0.00     Types: Cigarettes     Quit date: 3/17/1993     Years since quittin.1     Smokeless tobacco: Never   Vaping Use     Vaping status: Never Used   Substance Use Topics     Alcohol use: Not Currently       History   Drug Use No         Objective     There were no vitals taken for this visit.    Physical Exam    GENERAL APPEARANCE: healthy, alert and no distress     EYES: EOMI,  PERRL     HENT: ear canals and TM's normal and nose and mouth without ulcers or lesions     NECK: no adenopathy, no asymmetry, masses, or scars and thyroid normal to palpation     RESP: lungs clear to auscultation - no rales, rhonchi or wheezes     CV: regular rates and rhythm, normal S1 S2, no S3 or S4 and no murmur, click or rub     ABDOMEN:  soft, nontender, no HSM or masses and bowel sounds normal     MS: extremities normal- no gross deformities noted, no evidence of inflammation in joints, FROM in all extremities.     SKIN: no suspicious lesions or rashes     NEURO: Normal strength and tone, sensory exam grossly normal, mentation intact and speech normal     PSYCH: mentation appears normal. and affect normal/bright     LYMPHATICS: No cervical adenopathy    Recent Labs   Lab Test 22  0919 09/15/22  1028 22  1145 22  0802 22  0741 22  0710 22  0942 22  1746 22  0934 21   HGB 11.7* 10.8*  --   --   --   --  9.9*   < > 12.2* 12.7*    255  --  430  --   " --  408   < > 254 379   INR  --   --   --   --   --   --   --   --   --  1.17*   NA  --   --   --   --   --  138 136   < > 140 136   POTASSIUM  --   --   --   --   --  4.6 4.8   < > 4.4 4.6   CR  --   --   --  1.24 1.25 1.23 1.35*   < > 1.25 1.43*   A1C  --   --  7.2*  --   --   --   --   --  8.6*  --     < > = values in this interval not displayed.        Diagnostics:  Labs pending at this time.  Results will be reviewed when available.   No EKG required for low risk surgery (cataract, skin procedure, breast biopsy, etc).    Revised Cardiac Risk Index (RCRI):  The patient has the following serious cardiovascular risks for perioperative complications:   - No serious cardiac risks = 0 points     RCRI Interpretation: 0 points: Class I (very low risk - 0.4% complication rate)           Signed Electronically by: Rickey Adamson MD  Copy of this evaluation report is provided to requesting physician.

## 2023-04-25 ASSESSMENT — ENCOUNTER SYMPTOMS
NAUSEA: 0
DIZZINESS: 0
FREQUENCY: 1
HEARTBURN: 0
DIARRHEA: 0
SHORTNESS OF BREATH: 0
CHILLS: 0
NERVOUS/ANXIOUS: 0
ABDOMINAL PAIN: 0
JOINT SWELLING: 0
COUGH: 0
CONSTIPATION: 0
MYALGIAS: 1
DYSURIA: 0
PALPITATIONS: 0
FEVER: 0
EYE PAIN: 0
HEMATOCHEZIA: 0
PARESTHESIAS: 0
SORE THROAT: 0
HEADACHES: 0
ARTHRALGIAS: 1
HEMATURIA: 0
WEAKNESS: 0

## 2023-04-25 ASSESSMENT — ACTIVITIES OF DAILY LIVING (ADL): CURRENT_FUNCTION: NO ASSISTANCE NEEDED

## 2023-04-27 ENCOUNTER — OFFICE VISIT (OUTPATIENT)
Dept: FAMILY MEDICINE | Facility: CLINIC | Age: 78
End: 2023-04-27
Payer: COMMERCIAL

## 2023-04-27 VITALS
SYSTOLIC BLOOD PRESSURE: 134 MMHG | HEIGHT: 72 IN | OXYGEN SATURATION: 97 % | DIASTOLIC BLOOD PRESSURE: 82 MMHG | TEMPERATURE: 96.4 F | BODY MASS INDEX: 32.29 KG/M2 | WEIGHT: 238.4 LBS | HEART RATE: 79 BPM | RESPIRATION RATE: 18 BRPM

## 2023-04-27 DIAGNOSIS — Z00.00 ENCOUNTER FOR MEDICARE ANNUAL WELLNESS EXAM: ICD-10-CM

## 2023-04-27 DIAGNOSIS — E11.621 TYPE 2 DIABETES MELLITUS WITH FOOT ULCER, WITHOUT LONG-TERM CURRENT USE OF INSULIN (H): Primary | ICD-10-CM

## 2023-04-27 DIAGNOSIS — I25.10 CORONARY ARTERY DISEASE INVOLVING NATIVE CORONARY ARTERY OF NATIVE HEART WITHOUT ANGINA PECTORIS: ICD-10-CM

## 2023-04-27 DIAGNOSIS — N18.30 STAGE 3 CHRONIC KIDNEY DISEASE, UNSPECIFIED WHETHER STAGE 3A OR 3B CKD (H): ICD-10-CM

## 2023-04-27 DIAGNOSIS — Z92.29 IMMUNIZATIONS REVIEWED AND UP TO DATE: ICD-10-CM

## 2023-04-27 DIAGNOSIS — L97.509 TYPE 2 DIABETES MELLITUS WITH FOOT ULCER, WITHOUT LONG-TERM CURRENT USE OF INSULIN (H): Primary | ICD-10-CM

## 2023-04-27 LAB
ERYTHROCYTE [DISTWIDTH] IN BLOOD BY AUTOMATED COUNT: 15.4 % (ref 10–15)
HBA1C MFR BLD: 7.7 % (ref 0–5.6)
HCT VFR BLD AUTO: 38.9 % (ref 40–53)
HGB BLD-MCNC: 12.5 G/DL (ref 13.3–17.7)
MCH RBC QN AUTO: 29.9 PG (ref 26.5–33)
MCHC RBC AUTO-ENTMCNC: 32.1 G/DL (ref 31.5–36.5)
MCV RBC AUTO: 93 FL (ref 78–100)
PLATELET # BLD AUTO: 273 10E3/UL (ref 150–450)
RBC # BLD AUTO: 4.18 10E6/UL (ref 4.4–5.9)
WBC # BLD AUTO: 9.6 10E3/UL (ref 4–11)

## 2023-04-27 PROCEDURE — 80053 COMPREHEN METABOLIC PANEL: CPT | Performed by: FAMILY MEDICINE

## 2023-04-27 PROCEDURE — 85027 COMPLETE CBC AUTOMATED: CPT | Performed by: FAMILY MEDICINE

## 2023-04-27 PROCEDURE — 83036 HEMOGLOBIN GLYCOSYLATED A1C: CPT | Performed by: FAMILY MEDICINE

## 2023-04-27 PROCEDURE — 80061 LIPID PANEL: CPT | Performed by: FAMILY MEDICINE

## 2023-04-27 PROCEDURE — 99214 OFFICE O/P EST MOD 30 MIN: CPT | Performed by: FAMILY MEDICINE

## 2023-04-27 PROCEDURE — 36415 COLL VENOUS BLD VENIPUNCTURE: CPT | Performed by: FAMILY MEDICINE

## 2023-04-27 ASSESSMENT — ACTIVITIES OF DAILY LIVING (ADL)
EQUIPMENT_CURRENTLY_USED_AT_HOME: CANE, STRAIGHT
TOILETING_ISSUES: NO
DRESSING/BATHING_DIFFICULTY: NO
DIFFICULTY_EATING/SWALLOWING: NO
WEAR_GLASSES_OR_BLIND: YES
CONCENTRATING,_REMEMBERING_OR_MAKING_DECISIONS_DIFFICULTY: NO
DOING_ERRANDS_INDEPENDENTLY_DIFFICULTY: NO
WALKING_OR_CLIMBING_STAIRS_DIFFICULTY: NO
FALL_HISTORY_WITHIN_LAST_SIX_MONTHS: NO
CHANGE_IN_FUNCTIONAL_STATUS_SINCE_ONSET_OF_CURRENT_ILLNESS/INJURY: NO
DIFFICULTY_COMMUNICATING: NO
HEARING_DIFFICULTY_OR_DEAF: NO

## 2023-04-27 ASSESSMENT — PAIN SCALES - GENERAL: PAINLEVEL: NO PAIN (0)

## 2023-04-27 NOTE — LETTER
April 28, 2023      Lance MORALES Patrice  [address] [place] [address] [address]  [facility] [address]        Dear ,    We are writing to inform you of your test results.    Your kidney function has decreased..probably because of age,medications and diabetes. I will send you to a nephrologist to look things over    Resulted Orders   Comprehensive metabolic panel (BMP + Alb, Alk Phos, ALT, AST, Total. Bili, TP)   Result Value Ref Range    Sodium 139 136 - 145 mmol/L    Potassium 4.3 3.4 - 5.3 mmol/L    Chloride 99 98 - 107 mmol/L    Carbon Dioxide (CO2) 22 22 - 29 mmol/L    Anion Gap 18 (H) 7 - 15 mmol/L    Urea Nitrogen 34.0 (H) 8.0 - 23.0 mg/dL    Creatinine 2.25 (H) 0.67 - 1.17 mg/dL    Calcium 9.6 8.8 - 10.2 mg/dL    Glucose 142 (H) 70 - 99 mg/dL    Alkaline Phosphatase 100 40 - 129 U/L    AST 17 10 - 50 U/L    ALT 26 10 - 50 U/L    Protein Total 7.5 6.4 - 8.3 g/dL    Albumin 4.3 3.5 - 5.2 g/dL    Bilirubin Total 0.3 <=1.2 mg/dL    GFR Estimate 29 (L) >60 mL/min/1.73m2      Comment:      eGFR calculated using 2021 CKD-EPI equation.   Lipid panel reflex to direct LDL Non-fasting   Result Value Ref Range    Cholesterol 108 <200 mg/dL    Triglycerides 87 <150 mg/dL    Direct Measure HDL 52 >=40 mg/dL    LDL Cholesterol Calculated 39 <=100 mg/dL    Non HDL Cholesterol 56 <130 mg/dL    Narrative    Cholesterol  Desirable:  <200 mg/dL    Triglycerides  Normal:  Less than 150 mg/dL  Borderline High:  150-199 mg/dL  High:  200-499 mg/dL  Very High:  Greater than or equal to 500 mg/dL    Direct Measure HDL  Female:  Greater than or equal to 50 mg/dL   Male:  Greater than or equal to 40 mg/dL    LDL Cholesterol  Desirable:  <100mg/dL  Above Desirable:  100-129 mg/dL   Borderline High:  130-159 mg/dL   High:  160-189 mg/dL   Very High:  >= 190 mg/dL    Non HDL Cholesterol  Desirable:  130 mg/dL  Above Desirable:  130-159 mg/dL  Borderline High:  160-189 mg/dL  High:  190-219 mg/dL  Very High:  Greater than or equal to 220 mg/dL    Hemoglobin A1c   Result Value Ref Range    Hemoglobin A1C 7.7 (H) 0.0 - 5.6 %      Comment:      Normal <5.7%   Prediabetes 5.7-6.4%    Diabetes 6.5% or higher     Note: Adopted from ADA consensus guidelines.   CBC with platelets   Result Value Ref Range    WBC Count 9.6 4.0 - 11.0 10e3/uL    RBC Count 4.18 (L) 4.40 - 5.90 10e6/uL    Hemoglobin 12.5 (L) 13.3 - 17.7 g/dL    Hematocrit 38.9 (L) 40.0 - 53.0 %    MCV 93 78 - 100 fL    MCH 29.9 26.5 - 33.0 pg    MCHC 32.1 31.5 - 36.5 g/dL    RDW 15.4 (H) 10.0 - 15.0 %    Platelet Count 273 150 - 450 10e3/uL       If you have any questions or concerns, please call the clinic at the number listed above.       Sincerely,      Rickey Adamson MD

## 2023-04-27 NOTE — PROGRESS NOTES
"SUBJECTIVE:   Lance is a 78 year old who presents for Preventive Visit.     cc I am here for my physical; I need my medications refilled.  Want to discuss my diabetes 2023     9:59 AM   Additional Questions   Roomed by Elicia   Patient has been advised of split billing requirements and indicates understanding: Yes    Have you ever done Advance Care Planning? (For example, a Health Directive, POLST, or a discussion with a medical provider or your loved ones about your wishes):     Answers for HPI/ROS submitted by the patient on 2023  In general, how would you rate your overall physical health?: good  Frequency of exercise:: None  Do you usually eat at least 4 servings of fruit and vegetables a day, include whole grains & fiber, and avoid regularly eating high fat or \"junk\" foods? : Yes  Taking medications regularly:: Yes  Medication side effects:: None  Activities of Daily Living: no assistance needed  Home safety: lack of grab bars in the bathroom  Hearing Impairment:: find that men's voices are easier to understand than woman's  In the past 6 months, have you been bothered by leaking of urine?: No  In general, how would you rate your overall mental or emotional health?: excellent  Additional concerns today:: No      Cognitive Screening: pass  1) Repeat 3 items: pass  2) Clock draw: 100%  3) 3 item recall: 3/3  Results: 3 items recalled: COGNITIVE IMPAIRMENT LESS LIKELY    Mini-CogTM Copyright MALINA Lee. Licensed by the author for use in [facility]; reprinted with permission ([email or web address]). All rights reserved.      Do you have sleep apnea, excessive snoring or daytime drowsiness?: yes    Reviewed and updated as needed this visit by clinical staff    Reviewed and updated as needed this visit by Provider    Social History     Tobacco Use     Smoking status: Former     Packs/day: 0.00     Types: Cigarettes     Quit date: 3/17/1993     Years since quittin.1     Smokeless tobacco: Never   Vaping " Use     Vaping status: Never Used   Substance Use Topics     Alcohol use: Not Currently     History of present illness and assessment: Lance has been under my care for about a year now.  He has had a complex past medical history including multiple surgeries.  He currently is recovering from low back procedure which helped his radicular pain considerably.  He is also seeing vascular surgery for foot ulcers related to poorly controlled diabetes.  We did place him on Rybelsus about 4 months ago and his blood sugars have plummeted and are now normal running in the 1 20-1 30 range.  He is feeling much better overall.  He is here for immunization updates.  He has not experienced any renal colic since he has been under our care.  He is now walking with a cane as opposed to a walker.  His bowels are moving well.  We did do it diabetes ulcer check today which showed no new ulcerations.  Chart was reviewed medications were reviewed all medical questions asked were answered I have personally reviewed family social history surgeries allergies problems list.  We did discuss his falls risk which is now decreased since he is much more mobile due to his recent spine procedure.  He is undergoing further foot surgery in July which we will do preoperative exam for very pleasant gentleman.      4/25/2023     4:21 PM   Alcohol Use   Prescreen: >3 drinks/day or >7 drinks/week? No          View : No data to display.              Do you have a current opioid prescription? No  Do you use any other controlled substances or medications that are not prescribed by a provider? None        Current providers sharing in care for this patient include:   Patient Care Team:  Rickey Adamson MD as PCP - General (Family Medicine)  Ayaka Azevedo DPM, Podiatry/Foot and Ankle Surgery as Assigned Musculoskeletal Provider  Lc Amaya MD as MD (Cardiovascular Disease)  Rickey Adamson MD as Assigned PCP  Lc Amaya MD as Assigned  Heart and Vascular Provider  Koko Wolfe MD as Assigned Infectious Disease Provider  Nakul Salazar DPM as Assigned Surgical Provider    The following health maintenance items are reviewed in Epic and correct as of today:  Health Maintenance   Topic Date Due     URINE DRUG SCREEN  Never done     HEPATITIS C SCREENING  Never done     ZOSTER IMMUNIZATION (1 of 2) Never done     DIABETIC FOOT EXAM  01/15/2021     MICROALBUMIN  11/01/2022     BMP  02/27/2023     A1C  03/09/2023     DTAP/TDAP/TD IMMUNIZATION (3 - Td or Tdap) 04/18/2023     MEDICARE ANNUAL WELLNESS VISIT  06/16/2023     LIPID  06/16/2023     EYE EXAM  07/05/2023     ANNUAL REVIEW OF HM ORDERS  09/09/2023     HEMOGLOBIN  04/19/2024     FALL RISK ASSESSMENT  04/27/2024     ADVANCE CARE PLANNING  06/16/2027     PHQ-2 (once per calendar year)  Completed     INFLUENZA VACCINE  Completed     Pneumococcal Vaccine: 65+ Years  Completed     URINALYSIS  Completed     COVID-19 Vaccine  Completed     IPV IMMUNIZATION  Aged Out     MENINGITIS IMMUNIZATION  Aged Out     COLORECTAL CANCER SCREENING  Discontinued     Lab work is in process          Review of Systems  Constitutional, HEENT, cardiovascular, pulmonary, GI, , musculoskeletal, neuro, skin, endocrine and psych systems are negative, except as otherwise noted.    OBJECTIVE:   /82   Pulse 79   Temp (!) 96.4  F (35.8  C) (Temporal)   Resp 18   Ht 1.829 m (6')   Wt 108.1 kg (238 lb 6.4 oz)   SpO2 97%   BMI 32.33 kg/m   Estimated body mass index is 32.33 kg/m  as calculated from the following:    Height as of this encounter: 1.829 m (6').    Weight as of this encounter: 108.1 kg (238 lb 6.4 oz).  Physical Exam  GENERAL: healthy, alert and no distress  EYES: Eyes grossly normal to inspection, PERRL and conjunctivae and sclerae normal  HENT: ear canals and TM's normal, nose and mouth without ulcers or lesions  NECK: no adenopathy, no asymmetry, masses, or scars and thyroid normal to  palpation  RESP: lungs clear to auscultation - no rales, rhonchi or wheezes  CV: regular rate and rhythm, normal S1 S2, no S3 or S4, no murmur, click or rub, no peripheral edema and peripheral pulses strong  ABDOMEN: soft, nontender, no hepatosplenomegaly, no masses and bowel sounds normal  MS: no gross musculoskeletal defects noted, no edema  SKIN: no suspicious lesions or rashes  NEURO: Normal strength and tone, mentation intact and speech normal  PSYCH: mentation appears normal, affect normal/bright        ASSESSMENT / PLAN:       ICD-10-CM    1. Type 2 diabetes mellitus with foot ulcer, without long-term current use of insulin (H)  E11.621 Comprehensive metabolic panel (BMP + Alb, Alk Phos, ALT, AST, Total. Bili, TP)    L97.509 Hemoglobin A1c      2. Stage 3 chronic kidney disease, unspecified whether stage 3a or 3b CKD (H)  N18.30 Comprehensive metabolic panel (BMP + Alb, Alk Phos, ALT, AST, Total. Bili, TP)     CBC with platelets      3. Encounter for Medicare annual wellness exam  Z00.00       4. Coronary artery disease involving native coronary artery of native heart without angina pectoris  I25.10 Lipid panel reflex to direct LDL Non-fasting     CBC with platelets      5. Immunizations reviewed and up to date  Z76.89 TD PRESERV FREE, IM (7+ YRS) (DECAVAC/TENIVAC)          Patient has been advised of split billing requirements and indicates understanding: Yes      COUNSELING:  Reviewed preventive health counseling, as reflected in patient instructions       Healthy diet/nutrition      BMI:   Estimated body mass index is 32.33 kg/m  as calculated from the following:    Height as of this encounter: 1.829 m (6').    Weight as of this encounter: 108.1 kg (238 lb 6.4 oz).   Weight management plan: Discussed healthy diet and exercise guidelines      He reports that he quit smoking about 30 years ago. His smoking use included cigarettes. He has never used smokeless tobacco.      Appropriate preventive services were  discussed with this patient, including applicable screening as appropriate for cardiovascular disease, diabetes, osteopenia/osteoporosis, and glaucoma.  As appropriate for age/gender, discussed screening for colorectal cancer, prostate cancer, breast cancer, and cervical cancer. Checklist reviewing preventive services available has been given to the patient.    Reviewed patients plan of care and provided an AVS. The Basic Care Plan (routine screening as documented in Health Maintenance) for Lance meets the Care Plan requirement. This Care Plan has been established and reviewed with the Patient.      Rickey Adamson MD  [facility]    Identified Health Risks:

## 2023-04-28 ENCOUNTER — HOSPITAL ENCOUNTER (OUTPATIENT)
Dept: PHYSICAL THERAPY | Facility: REHABILITATION | Age: 78
Discharge: HOME | End: 2023-04-28
Payer: COMMERCIAL

## 2023-04-28 DIAGNOSIS — I89.0 LYMPHEDEMA: Primary | ICD-10-CM

## 2023-04-28 LAB
ALBUMIN SERPL BCG-MCNC: 4.3 G/DL (ref 3.5–5.2)
ALP SERPL-CCNC: 100 U/L (ref 40–129)
ALT SERPL W P-5'-P-CCNC: 26 U/L (ref 10–50)
ANION GAP SERPL CALCULATED.3IONS-SCNC: 18 MMOL/L (ref 7–15)
AST SERPL W P-5'-P-CCNC: 17 U/L (ref 10–50)
BILIRUB SERPL-MCNC: 0.3 MG/DL
BUN SERPL-MCNC: 34 MG/DL (ref 8–23)
CALCIUM SERPL-MCNC: 9.6 MG/DL (ref 8.8–10.2)
CHLORIDE SERPL-SCNC: 99 MMOL/L (ref 98–107)
CHOLEST SERPL-MCNC: 108 MG/DL
CREAT SERPL-MCNC: 2.25 MG/DL (ref 0.67–1.17)
DEPRECATED HCO3 PLAS-SCNC: 22 MMOL/L (ref 22–29)
GFR SERPL CREATININE-BSD FRML MDRD: 29 ML/MIN/1.73M2
GLUCOSE SERPL-MCNC: 142 MG/DL (ref 70–99)
HDLC SERPL-MCNC: 52 MG/DL
LDLC SERPL CALC-MCNC: 39 MG/DL
NONHDLC SERPL-MCNC: 56 MG/DL
POTASSIUM SERPL-SCNC: 4.3 MMOL/L (ref 3.4–5.3)
PROT SERPL-MCNC: 7.5 G/DL (ref 6.4–8.3)
SODIUM SERPL-SCNC: 139 MMOL/L (ref 136–145)
TRIGL SERPL-MCNC: 87 MG/DL

## 2023-04-28 PROCEDURE — 97140 MANUAL THERAPY 1/> REGIONS: CPT | Mod: GP | Performed by: PHYSICAL THERAPIST

## 2023-04-28 NOTE — PATIENT INSTRUCTIONS
Patient Education   Personalized Prevention Plan  You are due for the preventive services outlined below.  Your care team is available to assist you in scheduling these services.  If you have already completed any of these items, please share that information with your care team to update in your medical record.  Health Maintenance Due   Topic Date Due     URINE DRUG SCREEN  Never done     Hepatitis C Screening  Never done     Zoster (Shingles) Vaccine (1 of 2) Never done     Diabetic Foot Exam  01/15/2021     Kidney Microalbumin Urine Test  11/01/2022     Diptheria Tetanus Pertussis (DTAP/TDAP/TD) Vaccine (3 - Td or Tdap) 04/18/2023

## 2023-04-30 ENCOUNTER — MYC MEDICAL ADVICE (OUTPATIENT)
Dept: FAMILY MEDICINE | Facility: CLINIC | Age: 78
End: 2023-04-30
Payer: COMMERCIAL

## 2023-05-02 ENCOUNTER — OFFICE VISIT (OUTPATIENT)
Dept: VASCULAR SURGERY | Facility: CLINIC | Age: 78
End: 2023-05-02
Attending: PODIATRIST
Payer: COMMERCIAL

## 2023-05-02 VITALS — SYSTOLIC BLOOD PRESSURE: 126 MMHG | HEART RATE: 78 BPM | DIASTOLIC BLOOD PRESSURE: 74 MMHG

## 2023-05-02 DIAGNOSIS — L97.511 DIABETIC ULCER OF TOE OF RIGHT FOOT ASSOCIATED WITH DIABETES MELLITUS DUE TO UNDERLYING CONDITION, LIMITED TO BREAKDOWN OF SKIN (H): Primary | ICD-10-CM

## 2023-05-02 DIAGNOSIS — E08.621 DIABETIC ULCER OF TOE OF RIGHT FOOT ASSOCIATED WITH DIABETES MELLITUS DUE TO UNDERLYING CONDITION, LIMITED TO BREAKDOWN OF SKIN (H): Primary | ICD-10-CM

## 2023-05-02 PROCEDURE — 11042 DBRDMT SUBQ TIS 1ST 20SQCM/<: CPT | Performed by: PODIATRIST

## 2023-05-02 ASSESSMENT — PAIN SCALES - GENERAL: PAINLEVEL: NO PAIN (0)

## 2023-05-02 NOTE — PATIENT INSTRUCTIONS
Important lnstructions        WEIGHT BEARING STATUS: You are to remain NON WEIGHT BEARING on your right foot. NON WEIGHT BEARING MEANS NO PRESSURE ON YOUR FOOT OR HEEL AT ANY TIME FOR ANY REASON!    2. OFFLOADING DEVICE: Must use a A WHEELCHAIR at all times! (do not use affected foot to push wheelchair)      4. ELEVATE: Elevating your leg means laying with your head on a pillow and your foot ABOVE YOUR WAIST.     5. DO NOT MOVE YOUR FOOT.  There is a risk of worsening the wound or incision. To give yourself a higher chance of healing, please DO NOT swing foot back and forth and wiggle foot/toes especially when inside a stabilization device.        Dressing Change lnstructions            If you see green drainage from the wound- use the acetic acid. Otherwise no need to use.       - Dressing Application of  Endoform    1. Endoform should be cut to the size of the wound.  It should touch the edges of the ulcer. It is okay if it overlap the edges a small amount.  Then, moisten the Endoform with saline.(If it is easier for you, you can moisten it before laying it in the wound)    2. Cover the wound with Endoform, followed by dry gauze, and secure with roll gauze if needed.     3. Endoform is naturally used by the body over time so you may not find it in the wound when you change your dressing.  If you do find some, gently cleanse the wound with saline. Do not remove the remaining Endoform, which may appear as an off-white to diana gel, just add Endoform on top.  Usually, more Endoform will need to be added every 5-7 days.     4. Change your top dressing every 1-2 days or as needed depending on drainage.    -Endoform is a collagen dressing created from ovine (sheep) fore-stomach tissue. The collagen extracellular matrix transforms into a soft conforming sheet, which is naturally incorporated into the wound over time.  Collagen dressings are used to stimulate wound healing.   ,       It IS NOT ok to get your wound wet in  the bath or shower    SEEK MEDICAL CARE IF:  You have an increase in swelling, pain, or redness around the wound.  You have an increase in the amount of pus coming from the wound.  There is a bad smell coming from the wound.  The wound appears to be worsening/enlarging  You have a fever greater than 101.5 F      It is ok to continue current wound care treatment/products for the next 2-3 days until new wound care supplies are ordered and arrive. If longer than this please contact our office at [phone].        We want to hear from you!   In the next few weeks, you should receive a call or email to complete a survey about your visit at [facility]. Please help us improve your appointment experience by letting us know how we did today. We strive to make your experience good and value any ways in which we could do better.      We value your input and suggestions.    Thank you for choosing the [facility]!

## 2023-05-02 NOTE — PROGRESS NOTES
FOOT AND ANKLE SURGERY/PODIATRY Progress Note      ASSESSMENT:   Diabetic Ulceration right hallux  Venous Stasis         TREATMENT:  -I discussed with the patient that the right hallux ulceration is measuring slightly smaller than the previous visit.      -Reviewed importance of nonweightbearing on the right foot is much as he is able to reduce stress along the right hallux ulceration.    -After discussion of risk factors and consent obtained 2% Lidocaine HCL jelly was applied, under clean conditions, the right hallux ulceration(s) were debrided using #15 blade scalpel.  Devitalized and nonviable tissue, along with any fibrin and slough, was removed to improve granulation tissue formation, stimulate wound healing, decrease overall bacteria load, disrupt biofilm formation and decrease edge senescence. Wound drainage was scant No. Total excisional debridement was 0.4 sq cm into the subcutaneous tissue with a depth of 0.2 cm.   Ulcers were improved afterwards and .  Measures were as noted on the flow sheet. Collagen with a gauze dresssing was applied. He will continue to apply Collagen with a gauze dresssing as directed.    -He will follow-up in 3 weeks    Nakul Salazar DPM  [facility] [place]      HPI: Lance Ibrahim was seen again today for a right hallux ulceration.  Patient relates that he recently moved to a new apartment and has been walking on his right foot more than in the past.    Past Medical History:   Diagnosis Date     Arthritis     osteoarthritis knees     BPH      CAD (coronary artery disease)     subtotal occlusion in the small distal LAD      Cardiomyopathy      Cerebral artery occlusion with cerebral infarction     TIA 1993, no residual     Chronic kidney disease      Chronic rhinitis      HTN (hypertension)      Hyperlipidemia      Kidney stone      PVD (peripheral vascular disease)      Sleep apnea     doesn't use cpap     TIA (transient ischaemic attack) 1993     Type 2  "diabetes mellitus - 11/08/2018     Ureteral stone        Past Surgical History:   Procedure Laterality Date     AMPUTATE TOE(S) Left 10/15/2018    Procedure: AMPUTATE TOE(S);  Left third toe amputation;  Surgeon: Ayaka Azevedo DPM, Podiatry/Foot and Ankle Surgery;  Location: [facility] OR     ARTHROPLASTY KNEE Right 12/07/2018    Procedure: Right total knee arthroplasty;  Surgeon: Issa Cunha MD;  Location: [facility] OR     COLONOSCOPY  03/09/2013    Procedure: COLONOSCOPY;  COLONOSCOPY;  Surgeon: Chau Hogan MD;  Location:  GI     CV HEART CATHETERIZATION WITH POSSIBLE INTERVENTION Left 03/05/2019    Procedure: Coronary Angiogram;  Surgeon: Nas Linda MD;  Location:  HEART CARDIAC CATH LAB     Diastasis recti repair  1985     EXTRACAPSULAR CATARACT EXTRATION WITH INTRAOCULAR LENS IMPLANT Left 03/13/2017     EXTRACAPSULAR CATARACT EXTRATION WITH INTRAOCULAR LENS IMPLANT Right      FOOT SURGERY  04/2013    cyst removal      HERNIA REPAIR  07/13/2004    ventral      ORIF Shoulder Left      Ventral hernia repair NOS  1987       Allergies   Allergen Reactions     Penicillins Anaphylaxis     8/25/22 - tolerated cefepime      Sulfa Antibiotics      \"itchy rash, swelling of face and hands\"     Ancef [Cefazolin] Rash         Current Outpatient Medications:      acetic acid 0.25 % irrigation, Irrigate with as directed daily Apply to gauze and let soak on wound for 5 minutes prior to dressing changes., Disp: 500 mL, Rfl: 3     amLODIPine (NORVASC) 5 MG tablet, Take 1 tablet (5 mg) by mouth daily, Disp: 90 tablet, Rfl: 3     aspirin (ASA) 81 MG EC tablet, Take by mouth every 24 hours, Disp: , Rfl:      atorvastatin (LIPITOR) 40 MG tablet, Take 1 tablet (40 mg) by mouth every 24 hours, Disp: 90 tablet, Rfl: 3     blood glucose monitoring (NO BRAND SPECIFIED) meter device kit, Use to test blood sugar 2 times daily or as directed., Disp: 1 kit, Rfl: 0     colchicine (COLCYRS) 0.6 MG tablet, TAKE 1 TABLET DAILY, Disp: " 90 tablet, Rfl: 3     CONTOUR NEXT TEST test strip, USE TO TEST BLOOD SUGAR 2 TIMES DAILY OR AS DIRECTED., Disp: 100 strip, Rfl: 0     Ferrous Gluconate 240 (27 Fe) MG TABS, Take 1 tablet by mouth daily , Disp: , Rfl:      finasteride (PROSCAR) 5 MG tablet, Take by mouth every 24 hours, Disp: , Rfl:      fluticasone (FLONASE) 50 MCG/ACT nasal spray, Spray 1-2 sprays in nostril every 24 hours, Disp: , Rfl:      gabapentin (NEURONTIN) 300 MG capsule, Take 1 capsule (300 mg) by mouth 3 times daily, Disp: 270 capsule, Rfl: 0     isosorbide mononitrate (IMDUR) 30 MG 24 hr tablet, Take 1 tablet (30 mg) by mouth daily, Disp: 90 tablet, Rfl: 3     labetalol (NORMODYNE) 200 MG tablet, TAKE 1 TABLET BY MOUTH TWICE A DAY, Disp: 180 tablet, Rfl: 2     Lidocaine (LIDOCARE) 4 % Patch, Place 1 patch onto the skin every 24 hours To prevent lidocaine toxicity, patient should be patch free for 12 hrs daily. (Patient taking differently: Place 1 patch onto the skin every 24 hours To prevent lidocaine toxicity, patient should be patch free for 12 hrs daily.  To back every am), Disp: , Rfl:      lisinopril (ZESTRIL) 10 MG tablet, Take 1 tablet (10 mg) by mouth every 24 hours, Disp: 90 tablet, Rfl: 3     loratadine (CLARITIN) 10 MG tablet, Take by mouth every 24 hours, Disp: , Rfl:      metFORMIN (GLUCOPHAGE) 1000 MG tablet, TAKE 1 TABLET TWICE A DAY WITH MEALS, Disp: 180 tablet, Rfl: 0     Microlet Lancets MISC, USE TO TEST BLOOD SUGAR 2 TIMES DAILY OR AS DIRECTED., Disp: 100 each, Rfl: 1     multivitamin w/minerals (THERA-VIT-M) tablet, Take 1 tablet by mouth daily, Disp: , Rfl:      oxyCODONE (ROXICODONE) 5 MG tablet, TAKE 1 TABLET BY ORAL ROUTE EVERY 4 - 6 HOURS AS NEEDED FOR ACUTE PAIN., Disp: , Rfl:      Probiotic Product (FORTIFY 30 BILLION PROBIOT 50+ PO), , Disp: , Rfl:      Semaglutide (RYBELSUS) 7 MG TABS, Take 7 mg by mouth daily, Disp: 90 tablet, Rfl: 3     tamsulosin (FLOMAX) 0.4 MG 24 hr capsule, Take 1 capsule by mouth  daily., Disp: , Rfl:      torsemide (DEMADEX) 20 MG tablet, Take 1 tablet (20 mg) by mouth daily, Disp: 90 tablet, Rfl: 2    Review of Systems - 10 point Review of Systems is negative except for right hallux ulcer which is noted in HPI.      OBJECTIVE:  /74   Pulse 78   General appearance: Patient is alert and fully cooperative with history & exam.  No sign of distress is noted during the visit.    Vascular: Dorsalis pedis non-palpableRight.  Dermatologic:    Wound 10/14/18 Foot Abrasion(s) GRAYISH DISCOLORATION (Active)       Wound 12/07/18 Left;Lower Foot Amputation blackened area third left toe amputation incision site (Active)       Wound 07/27/20 Left Toe (Comment  which one) Ulceration Left foot pinky toe (Active)       Wound 07/27/20 Right Toe (Comment  which one) Ulceration Right great toe wound  (Active)       Wound Toe (Comment  which one) Ulceration (Active)       Wound Foot Ulceration (Active)       Wound Foot Ulceration (Active)       Rash 10/21/18 1543 Bilateral other (see comments) (Active)       Rash 07/27/20 2015 Left lower leg other (see comments) (Active)       VASC Wound right hallux (Active)   Pre Size Length 0.8 04/04/23 1517   Pre Size Width 0.1 04/04/23 1517   Pre Size Depth 0.4 04/04/23 1517   Pre Total Sq cm 0.06 11/17/22 1200   Post Size Length 1.5 04/04/23 1517   Post Size Width 0.5 04/04/23 1517   Post Size Depth 0.2 04/04/23 1517   Post Total Sq cm 0.75 04/04/23 1517   Undermined 0.4 @ 8o'clock 10/06/22 1300   Description callous 04/04/23 1517       Incision/Surgical Site 10/15/18 Left Foot (Active)       Incision/Surgical Site 12/07/18 Right Knee (Active)   Granular base right hallux ulceration, no erythema noted.  Neurologic: Diminished to light touch Right.  Musculoskeletal: Contracted digits noted Right.    Imaging:     No results found.       Picture:

## 2023-05-05 ENCOUNTER — HOSPITAL ENCOUNTER (OUTPATIENT)
Dept: PHYSICAL THERAPY | Facility: REHABILITATION | Age: 78
Discharge: HOME | End: 2023-05-05
Payer: COMMERCIAL

## 2023-05-05 DIAGNOSIS — I87.8 VENOUS STASIS: ICD-10-CM

## 2023-05-05 DIAGNOSIS — I89.0 LYMPHEDEMA: ICD-10-CM

## 2023-05-05 DIAGNOSIS — L97.511 DIABETIC ULCER OF TOE OF RIGHT FOOT ASSOCIATED WITH DIABETES MELLITUS DUE TO UNDERLYING CONDITION, LIMITED TO BREAKDOWN OF SKIN (H): ICD-10-CM

## 2023-05-05 DIAGNOSIS — L03.115 CELLULITIS OF RIGHT LOWER EXTREMITY: ICD-10-CM

## 2023-05-05 DIAGNOSIS — M62.81 GENERALIZED MUSCLE WEAKNESS: Primary | ICD-10-CM

## 2023-05-05 DIAGNOSIS — E08.621 DIABETIC ULCER OF TOE OF RIGHT FOOT ASSOCIATED WITH DIABETES MELLITUS DUE TO UNDERLYING CONDITION, LIMITED TO BREAKDOWN OF SKIN (H): ICD-10-CM

## 2023-05-05 PROCEDURE — 97140 MANUAL THERAPY 1/> REGIONS: CPT | Mod: GP | Performed by: PHYSICAL THERAPY ASSISTANT

## 2023-05-10 ENCOUNTER — HOSPITAL ENCOUNTER (OUTPATIENT)
Dept: PHYSICAL THERAPY | Facility: REHABILITATION | Age: 78
Discharge: HOME | End: 2023-05-10
Payer: COMMERCIAL

## 2023-05-10 DIAGNOSIS — L03.115 CELLULITIS OF RIGHT LOWER EXTREMITY: ICD-10-CM

## 2023-05-10 DIAGNOSIS — M62.81 GENERALIZED MUSCLE WEAKNESS: Primary | ICD-10-CM

## 2023-05-10 DIAGNOSIS — I89.0 LYMPHEDEMA: ICD-10-CM

## 2023-05-10 DIAGNOSIS — L97.511 DIABETIC ULCER OF TOE OF RIGHT FOOT ASSOCIATED WITH DIABETES MELLITUS DUE TO UNDERLYING CONDITION, LIMITED TO BREAKDOWN OF SKIN (H): ICD-10-CM

## 2023-05-10 DIAGNOSIS — E08.621 DIABETIC ULCER OF TOE OF RIGHT FOOT ASSOCIATED WITH DIABETES MELLITUS DUE TO UNDERLYING CONDITION, LIMITED TO BREAKDOWN OF SKIN (H): ICD-10-CM

## 2023-05-10 DIAGNOSIS — I87.8 VENOUS STASIS: ICD-10-CM

## 2023-05-10 PROCEDURE — 97140 MANUAL THERAPY 1/> REGIONS: CPT | Mod: GP | Performed by: PHYSICAL THERAPIST

## 2023-05-11 ENCOUNTER — TRANSFERRED RECORDS (OUTPATIENT)
Dept: MULTI SPECIALTY CLINIC | Facility: CLINIC | Age: 78
End: 2023-05-11

## 2023-05-11 LAB — RETINOPATHY: NORMAL

## 2023-05-17 ENCOUNTER — THERAPY VISIT (OUTPATIENT)
Dept: PHYSICAL THERAPY | Facility: REHABILITATION | Age: 78
End: 2023-05-17
Payer: COMMERCIAL

## 2023-05-17 DIAGNOSIS — M62.81 GENERALIZED MUSCLE WEAKNESS: Primary | ICD-10-CM

## 2023-05-17 DIAGNOSIS — L03.115 CELLULITIS OF RIGHT LOWER EXTREMITY: ICD-10-CM

## 2023-05-17 DIAGNOSIS — L97.511 DIABETIC ULCER OF TOE OF RIGHT FOOT ASSOCIATED WITH DIABETES MELLITUS DUE TO UNDERLYING CONDITION, LIMITED TO BREAKDOWN OF SKIN (H): ICD-10-CM

## 2023-05-17 DIAGNOSIS — I87.8 VENOUS STASIS: ICD-10-CM

## 2023-05-17 DIAGNOSIS — I89.0 LYMPHEDEMA: ICD-10-CM

## 2023-05-17 DIAGNOSIS — E08.621 DIABETIC ULCER OF TOE OF RIGHT FOOT ASSOCIATED WITH DIABETES MELLITUS DUE TO UNDERLYING CONDITION, LIMITED TO BREAKDOWN OF SKIN (H): ICD-10-CM

## 2023-05-17 PROCEDURE — 97140 MANUAL THERAPY 1/> REGIONS: CPT | Mod: GP | Performed by: PHYSICAL THERAPIST

## 2023-05-19 ENCOUNTER — TRANSFERRED RECORDS (OUTPATIENT)
Dept: HEALTH INFORMATION MANAGEMENT | Facility: CLINIC | Age: 78
End: 2023-05-19

## 2023-05-23 ENCOUNTER — OFFICE VISIT (OUTPATIENT)
Dept: VASCULAR SURGERY | Facility: CLINIC | Age: 78
End: 2023-05-23
Attending: PODIATRIST
Payer: COMMERCIAL

## 2023-05-23 VITALS — OXYGEN SATURATION: 97 % | SYSTOLIC BLOOD PRESSURE: 126 MMHG | DIASTOLIC BLOOD PRESSURE: 68 MMHG | HEART RATE: 68 BPM

## 2023-05-23 DIAGNOSIS — L97.509 TYPE 2 DIABETES MELLITUS WITH FOOT ULCER, WITHOUT LONG-TERM CURRENT USE OF INSULIN (H): ICD-10-CM

## 2023-05-23 DIAGNOSIS — L97.511 DIABETIC ULCER OF TOE OF RIGHT FOOT ASSOCIATED WITH DIABETES MELLITUS DUE TO UNDERLYING CONDITION, LIMITED TO BREAKDOWN OF SKIN (H): Primary | ICD-10-CM

## 2023-05-23 DIAGNOSIS — E11.621 TYPE 2 DIABETES MELLITUS WITH FOOT ULCER, WITHOUT LONG-TERM CURRENT USE OF INSULIN (H): ICD-10-CM

## 2023-05-23 DIAGNOSIS — E08.621 DIABETIC ULCER OF TOE OF RIGHT FOOT ASSOCIATED WITH DIABETES MELLITUS DUE TO UNDERLYING CONDITION, LIMITED TO BREAKDOWN OF SKIN (H): Primary | ICD-10-CM

## 2023-05-23 PROCEDURE — 11042 DBRDMT SUBQ TIS 1ST 20SQCM/<: CPT | Performed by: PODIATRIST

## 2023-05-23 ASSESSMENT — PAIN SCALES - GENERAL: PAINLEVEL: NO PAIN (0)

## 2023-05-23 NOTE — PROGRESS NOTES
FOOT AND ANKLE SURGERY/PODIATRY Progress Note      ASSESSMENT:   Diabetic Ulceration right hallux  Venous Stasis         TREATMENT:  -I discussed with the patient that the right hallux ulceration is measuring slightly larger than the previous visit.      -Reviewed importance of nonweightbearing on the right foot and risk of delayed wound healing, infection and need for amputation with continued ambulation.    -After discussion of risk factors and consent obtained 2% Lidocaine HCL jelly was applied, under clean conditions, the right hallux ulceration(s) were debrided using #15 blade scalpel.  Devitalized and nonviable tissue, along with any fibrin and slough, was removed to improve granulation tissue formation, stimulate wound healing, decrease overall bacteria load, disrupt biofilm formation and decrease edge senescence. Wound drainage was scant No. Total excisional debridement was 0.75 sq cm into the subcutaneous tissue with a depth of 0.2 cm.   Ulcers were improved afterwards and .  Measures were as noted on the flow sheet. Collagen with a gauze dresssing was applied. He will continue to apply Collagen with a gauze dresssing as directed.    -He will follow-up in 3 weeks    Nakul Salazar DPM  [facility]      HPI: Lance Ibrahim was seen again today for a right hallux ulceration.  Patient relates that he recently moved to a new apartment and has been walking on his right foot more than in the past.    Past Medical History:   Diagnosis Date     Arthritis     osteoarthritis knees     BPH      CAD (coronary artery disease)     subtotal occlusion in the small distal LAD      Cardiomyopathy      Cerebral artery occlusion with cerebral infarction     TIA 1993, no residual     Chronic kidney disease      Chronic rhinitis      HTN (hypertension)      Hyperlipidemia      Kidney stone      PVD (peripheral vascular disease)      Sleep apnea     doesn't use cpap     TIA (transient ischaemic attack)  "1993     Type 2 diabetes mellitus - 11/08/2018     Ureteral stone        Past Surgical History:   Procedure Laterality Date     AMPUTATE TOE(S) Left 10/15/2018    Procedure: AMPUTATE TOE(S);  Left third toe amputation;  Surgeon: Ayaka Azevedo DPM, Podiatry/Foot and Ankle Surgery;  Location: [facility] OR     ARTHROPLASTY KNEE Right 12/07/2018    Procedure: Right total knee arthroplasty;  Surgeon: Issa Cunha MD;  Location: [facility] OR     COLONOSCOPY  03/09/2013    Procedure: COLONOSCOPY;  COLONOSCOPY;  Surgeon: Chau Hogan MD;  Location:  GI     CV HEART CATHETERIZATION WITH POSSIBLE INTERVENTION Left 03/05/2019    Procedure: Coronary Angiogram;  Surgeon: Nas Linda MD;  Location:  HEART CARDIAC CATH LAB     Diastasis recti repair  1985     EXTRACAPSULAR CATARACT EXTRATION WITH INTRAOCULAR LENS IMPLANT Left 03/13/2017     EXTRACAPSULAR CATARACT EXTRATION WITH INTRAOCULAR LENS IMPLANT Right      FOOT SURGERY  04/2013    cyst removal      HERNIA REPAIR  07/13/2004    ventral      ORIF Shoulder Left      Ventral hernia repair NOS  1987       Allergies   Allergen Reactions     Penicillins Anaphylaxis     8/25/22 - tolerated cefepime      Sulfa Antibiotics      \"itchy rash, swelling of face and hands\"     Ancef [Cefazolin] Rash         Current Outpatient Medications:      acetic acid 0.25 % irrigation, Irrigate with as directed daily Apply to gauze and let soak on wound for 5 minutes prior to dressing changes., Disp: 500 mL, Rfl: 3     amLODIPine (NORVASC) 5 MG tablet, Take 1 tablet (5 mg) by mouth daily, Disp: 90 tablet, Rfl: 3     aspirin (ASA) 81 MG EC tablet, Take by mouth every 24 hours, Disp: , Rfl:      atorvastatin (LIPITOR) 40 MG tablet, Take 1 tablet (40 mg) by mouth every 24 hours, Disp: 90 tablet, Rfl: 3     blood glucose monitoring (NO BRAND SPECIFIED) meter device kit, Use to test blood sugar 2 times daily or as directed., Disp: 1 kit, Rfl: 0     colchicine (COLCYRS) 0.6 MG tablet, TAKE 1 " TABLET DAILY, Disp: 90 tablet, Rfl: 3     CONTOUR NEXT TEST test strip, USE TO TEST BLOOD SUGAR 2 TIMES DAILY OR AS DIRECTED., Disp: 100 strip, Rfl: 0     Ferrous Gluconate 240 (27 Fe) MG TABS, Take 1 tablet by mouth daily , Disp: , Rfl:      finasteride (PROSCAR) 5 MG tablet, Take by mouth every 24 hours, Disp: , Rfl:      fluticasone (FLONASE) 50 MCG/ACT nasal spray, Spray 1-2 sprays in nostril every 24 hours, Disp: , Rfl:      gabapentin (NEURONTIN) 300 MG capsule, Take 1 capsule (300 mg) by mouth 3 times daily, Disp: 270 capsule, Rfl: 0     isosorbide mononitrate (IMDUR) 30 MG 24 hr tablet, Take 1 tablet (30 mg) by mouth daily, Disp: 90 tablet, Rfl: 3     labetalol (NORMODYNE) 200 MG tablet, TAKE 1 TABLET BY MOUTH TWICE A DAY, Disp: 180 tablet, Rfl: 2     Lidocaine (LIDOCARE) 4 % Patch, Place 1 patch onto the skin every 24 hours To prevent lidocaine toxicity, patient should be patch free for 12 hrs daily. (Patient taking differently: Place 1 patch onto the skin every 24 hours To prevent lidocaine toxicity, patient should be patch free for 12 hrs daily.  To back every am), Disp: , Rfl:      lisinopril (ZESTRIL) 10 MG tablet, Take 1 tablet (10 mg) by mouth every 24 hours, Disp: 90 tablet, Rfl: 3     loratadine (CLARITIN) 10 MG tablet, Take by mouth every 24 hours, Disp: , Rfl:      metFORMIN (GLUCOPHAGE) 1000 MG tablet, TAKE 1 TABLET TWICE A DAY WITH MEALS, Disp: 180 tablet, Rfl: 0     Microlet Lancets MISC, USE TO TEST BLOOD SUGAR 2 TIMES DAILY OR AS DIRECTED., Disp: 100 each, Rfl: 1     multivitamin w/minerals (THERA-VIT-M) tablet, Take 1 tablet by mouth daily, Disp: , Rfl:      oxyCODONE (ROXICODONE) 5 MG tablet, TAKE 1 TABLET BY ORAL ROUTE EVERY 4 - 6 HOURS AS NEEDED FOR ACUTE PAIN., Disp: , Rfl:      Probiotic Product (FORTIFY 30 BILLION PROBIOT 50+ PO), , Disp: , Rfl:      Semaglutide (RYBELSUS) 7 MG TABS, Take 7 mg by mouth daily, Disp: 90 tablet, Rfl: 3     tamsulosin (FLOMAX) 0.4 MG 24 hr capsule, Take 1  capsule by mouth daily., Disp: , Rfl:      torsemide (DEMADEX) 20 MG tablet, Take 1 tablet (20 mg) by mouth daily, Disp: 90 tablet, Rfl: 2    Review of Systems - 10 point Review of Systems is negative except for right hallux ulcer which is noted in HPI.      OBJECTIVE:  /68   Pulse 68   SpO2 97%   General appearance: Patient is alert and fully cooperative with history & exam.  No sign of distress is noted during the visit.    Vascular: Dorsalis pedis non-palpableRight.  Dermatologic:    Wound 10/14/18 Foot Abrasion(s) GRAYISH DISCOLORATION (Active)       Wound 12/07/18 Left;Lower Foot Amputation blackened area third left toe amputation incision site (Active)       Wound 07/27/20 Left Toe (Comment  which one) Ulceration Left foot pinky toe (Active)       Wound 07/27/20 Right Toe (Comment  which one) Ulceration Right great toe wound  (Active)       Wound Toe (Comment  which one) Ulceration (Active)       Wound Foot Ulceration (Active)       Wound Foot Ulceration (Active)       Rash 10/21/18 1543 Bilateral other (see comments) (Active)       Rash 07/27/20 2015 Left lower leg other (see comments) (Active)       VASC Wound right hallux (Active)   Pre Size Length 0.8 04/04/23 1517   Pre Size Width 0.1 04/04/23 1517   Pre Size Depth 0.4 04/04/23 1517   Pre Total Sq cm 0.06 11/17/22 1200   Post Size Length 1.5 04/04/23 1517   Post Size Width 0.5 04/04/23 1517   Post Size Depth 0.2 04/04/23 1517   Post Total Sq cm 0.75 04/04/23 1517   Undermined 0.4 @ 8o'clock 10/06/22 1300   Description callous 04/04/23 1517       Incision/Surgical Site 10/15/18 Left Foot (Active)       Incision/Surgical Site 12/07/18 Right Knee (Active)   Granular base right hallux ulceration, no erythema noted.  Neurologic: Diminished to light touch Right.  Musculoskeletal: Contracted digits noted Right.    Imaging:     No results found.       Picture:

## 2023-05-23 NOTE — PATIENT INSTRUCTIONS
Important lnstructions        WEIGHT BEARING STATUS: You are to remain NON WEIGHT BEARING on your right foot. NON WEIGHT BEARING MEANS NO PRESSURE ON YOUR FOOT OR HEEL AT ANY TIME FOR ANY REASON!    2. OFFLOADING DEVICE: Must use a A WHEELCHAIR at all times! (do not use affected foot to push wheelchair)      4. ELEVATE: Elevating your leg means laying with your head on a pillow and your foot ABOVE YOUR WAIST.     5. DO NOT MOVE YOUR FOOT.  There is a risk of worsening the wound or incision. To give yourself a higher chance of healing, please DO NOT swing foot back and forth and wiggle foot/toes especially when inside a stabilization device.        Dressing Change lnstructions            If you see green drainage from the wound- use the acetic acid. Otherwise no need to use.       - Dressing Application of  Endoform    1. Endoform should be cut to the size of the wound.  It should touch the edges of the ulcer. It is okay if it overlap the edges a small amount.  Then, moisten the Endoform with saline.(If it is easier for you, you can moisten it before laying it in the wound)    2. Cover the wound with Endoform, followed by dry gauze, and secure with roll gauze if needed.     3. Endoform is naturally used by the body over time so you may not find it in the wound when you change your dressing.  If you do find some, gently cleanse the wound with saline. Do not remove the remaining Endoform, which may appear as an off-white to diana gel, just add Endoform on top.  Usually, more Endoform will need to be added every 5-7 days.     4. Change your top dressing every 1-2 days or as needed depending on drainage.    -Endoform is a collagen dressing created from ovine (sheep) fore-stomach tissue. The collagen extracellular matrix transforms into a soft conforming sheet, which is naturally incorporated into the wound over time.  Collagen dressings are used to stimulate wound healing.   ,       It IS NOT ok to get your wound wet in  the bath or shower    SEEK MEDICAL CARE IF:  You have an increase in swelling, pain, or redness around the wound.  You have an increase in the amount of pus coming from the wound.  There is a bad smell coming from the wound.  The wound appears to be worsening/enlarging  You have a fever greater than 101.5 F      It is ok to continue current wound care treatment/products for the next 2-3 days until new wound care supplies are ordered and arrive. If longer than this please contact our office at [phone].        We want to hear from you!   In the next few weeks, you should receive a call or email to complete a survey about your visit at [facility]. Please help us improve your appointment experience by letting us know how we did today. We strive to make your experience good and value any ways in which we could do better.      We value your input and suggestions.    Thank you for choosing the [facility]!

## 2023-05-24 NOTE — TELEPHONE ENCOUNTER
Routing refill request to provider for review/approval because:  Labs out of range:    Creatinine   Date Value Ref Range Status   04/27/2023 2.25 (H) 0.67 - 1.17 mg/dL Final   04/29/2021 1.19 0.66 - 1.25 mg/dL Final      GFR Estimate   Date Value Ref Range Status   04/27/2023 29 (L) >60 mL/min/1.73m2 Final     Comment:     eGFR calculated using 2021 CKD-EPI equation.   08/29/2022 60 (L) >60 mL/min/1.73m2 Final     Comment:     Effective December 21, 2021 eGFRcr in adults is calculated using the 2021 CKD-EPI creatinine equation which includes age and gender (Cecile et al., [facility], DOI: 10.1056/HGZTmf0446489)   08/28/2022 59 (L) >60 mL/min/1.73m2 Final     Comment:     Effective December 21, 2021 eGFRcr in adults is calculated using the 2021 CKD-EPI creatinine equation which includes age and gender (Cecile et al., [place], DOI: 10.1056/RGXOcn0262858)   04/29/2021 59 (L) >60 mL/min/[1.73_m2] Final     Comment:     Non  GFR Calc  Starting 12/18/2018, serum creatinine based estimated GFR (eGFR) will be   calculated using the Chronic Kidney Disease Epidemiology Collaboration   (CKD-EPI) equation.     07/28/2020 59 (L) >60 mL/min/[1.73_m2] Final     Comment:     Non  GFR Calc  Starting 12/18/2018, serum creatinine based estimated GFR (eGFR) will be   calculated using the Chronic Kidney Disease Epidemiology Collaboration   (CKD-EPI) equation.     07/27/2020 53 (L) >60 mL/min/[1.73_m2] Final     Comment:     Non  GFR Calc  Starting 12/18/2018, serum creatinine based estimated GFR (eGFR) will be   calculated using the Chronic Kidney Disease Epidemiology Collaboration   (CKD-EPI) equation.       GFR, ESTIMATED POCT   Date Value Ref Range Status   12/03/2021 15 (L) >60 mL/min/1.73m2 Final     GFR Estimate If Black   Date Value Ref Range Status   04/29/2021 68 >60 mL/min/[1.73_m2] Final     Comment:      GFR Calc  Starting 12/18/2018, serum creatinine based estimated GFR  (eGFR) will be   calculated using the Chronic Kidney Disease Epidemiology Collaboration   (CKD-EPI) equation.     07/28/2020 68 >60 mL/min/[1.73_m2] Final     Comment:      GFR Calc  Starting 12/18/2018, serum creatinine based estimated GFR (eGFR) will be   calculated using the Chronic Kidney Disease Epidemiology Collaboration   (CKD-EPI) equation.     07/27/2020 61 >60 mL/min/[1.73_m2] Final     Comment:      GFR Calc  Starting 12/18/2018, serum creatinine based estimated GFR (eGFR) will be   calculated using the Chronic Kidney Disease Epidemiology Collaboration   (CKD-EPI) equation.           Pending Prescriptions:                       Disp   Refills    metFORMIN (GLUCOPHAGE) 1000 MG tablet [Pha*180 ta*3        Sig: TAKE 1 TABLET TWICE A DAY WITH MEALS    Last Written Prescription Date:  2/22/2023  Last Fill Quantity: 180 tabs,  # refills: 0   Last office visit with prescribing provider: 4/27/2023   Future Office Visit:   Appointments in Next Year      May 31, 2023  9:00 AM  Lymphedema Treatment with Tori Watts PT  [facility] ([facility] ) [phone]     Jun 02, 2023  7:00 AM  Lymphedema Treatment with Jeri Lr PTA  [facility] ([facility] ) [phone]     Jun 08, 2023  8:00 AM  Lymphedema Treatment with DIONTE Argueta [facility] ([facility] ) [phone]     Jun 16, 2023  9:00 AM  Lymphedema Treatment with Jeri Lr PTA  [facility] ([facility] ) [phone]     Jun 20, 2023  1:20 PM  (Arrive by 1:05 PM)  Return Wound Provider with Nakul Salazar DPM  [facility] [place] ([facility] ) [phone]     Jun 30, 2023  9:00 AM  Lymphedema  Treatment with RONIT Sarmiento [facility] ([facility] ) [phone]     Jul 07, 2023 10:00 AM  Lymphedema Treatment with RONIT Sarmiento [facility] ([facility] ) [phone]     Jul 14, 2023 10:00 AM  Lymphedema Treatment with RONIT Sarmiento [facility] ([facility] ) [phone]     Jul 28, 2023 10:00 AM  Lymphedema Treatment with RONIT Sarmiento [facility] ([facility] ) [phone]          Apple Ribeiro, RN, BSN  [facility]

## 2023-05-25 ENCOUNTER — TRANSFERRED RECORDS (OUTPATIENT)
Dept: HEALTH INFORMATION MANAGEMENT | Facility: CLINIC | Age: 78
End: 2023-05-25
Payer: COMMERCIAL

## 2023-05-31 ENCOUNTER — MYC MEDICAL ADVICE (OUTPATIENT)
Dept: FAMILY MEDICINE | Facility: CLINIC | Age: 78
End: 2023-05-31

## 2023-05-31 ENCOUNTER — THERAPY VISIT (OUTPATIENT)
Dept: PHYSICAL THERAPY | Facility: REHABILITATION | Age: 78
End: 2023-05-31
Payer: COMMERCIAL

## 2023-05-31 DIAGNOSIS — I89.0 LYMPHEDEMA: ICD-10-CM

## 2023-05-31 DIAGNOSIS — L30.9 DERMATITIS: Primary | ICD-10-CM

## 2023-05-31 DIAGNOSIS — E08.621 DIABETIC ULCER OF TOE OF RIGHT FOOT ASSOCIATED WITH DIABETES MELLITUS DUE TO UNDERLYING CONDITION, LIMITED TO BREAKDOWN OF SKIN (H): ICD-10-CM

## 2023-05-31 DIAGNOSIS — L97.511 DIABETIC ULCER OF TOE OF RIGHT FOOT ASSOCIATED WITH DIABETES MELLITUS DUE TO UNDERLYING CONDITION, LIMITED TO BREAKDOWN OF SKIN (H): ICD-10-CM

## 2023-05-31 DIAGNOSIS — L03.115 CELLULITIS OF RIGHT LOWER EXTREMITY: ICD-10-CM

## 2023-05-31 DIAGNOSIS — M62.81 GENERALIZED MUSCLE WEAKNESS: Primary | ICD-10-CM

## 2023-05-31 DIAGNOSIS — I87.8 VENOUS STASIS: ICD-10-CM

## 2023-05-31 PROCEDURE — 97140 MANUAL THERAPY 1/> REGIONS: CPT | Mod: GP | Performed by: PHYSICAL THERAPIST

## 2023-05-31 NOTE — PROGRESS NOTES
[facility]                                                                                   OUTPATIENT PHYSICAL THERAPY    PLAN OF TREATMENT FOR OUTPATIENT REHABILITATION   Patient's Last Name, First Name, Lance Nguyễn YOB: 1945   Provider's Name   [facility]   Medical Record No.  [id number]     Onset Date: 08/25/22  Start of Care Date:       Medical Diagnosis:  Venous stasis      PT Treatment Diagnosis:  Right LE lymphedema secondary to venous stasis, right LE surgery, multiple infections Plan of Treatment  Frequency/Duration: 1 time per week/ cert through 8/28/2023    Certification date from  5/31/2023 to    8/28/2023       See note for plan of treatment details and functional goals     Tori Watts, PT                         I CERTIFY THE NEED FOR THESE SERVICES FURNISHED UNDER        THIS PLAN OF TREATMENT AND WHILE UNDER MY CARE     (Physician attestation of this document indicates review and certification of the therapy plan).                Referring Provider:  Nakul Salazar      Initial Assessment  See Epic Evaluation-                   05/31/23 0500   Appointment Info   Signing clinician's name / credentials Tori Watts PT   Visits Used 21 per med cert through 8/28/2023   Medical Diagnosis Venous stasis   PT Tx Diagnosis Right LE lymphedema secondary to venous stasis, right LE surgery, multiple infections   Precautions/Limitations falls risk, uses walker, wound on right great toe   Other pertinent information Re cert and PN done  on visit 21 (5/31/2023)   Progress Note/Certification   Onset of illness/injury or Date of Surgery 08/25/22   Therapy Frequency 1 time per week   Predicted Duration cert through 8/28/2023   Progress Note Due Date 06/30/23   Progress Note Completed Date 05/31/23   Supervision   PT Assistant Visit Number 1   GOALS   PT Goals 2;3;4   PT Goal 1   Goal Identifier swelling   Goal  Description patient will reduce right arch of foot circumference to 26.5 or less demonstrating reduced foot swelling for improved healing of his great toe wound   Goal Progress PROGRESSING TOWARD, this goes up and down depending on how he wraps.   Target Date 08/28/23   PT Goal 2   Goal Identifier Great toe wound   Goal Description patient will have right great toe ulcer healed for reduced infection risk   Goal Progress PROGRESSING TOWARD, did get larger the last measurement at [facility].   Target Date 08/28/23   Subjective Report   Subjective Report Has been using his velcro compression and wrapping around his foot with one short stretch wrap since last session 2 weeks ago. He states he cut the wrap d/t it being too much wrap around his foot. He hasn't used his compression pump for 5 days because he feels the swelling is improved in his foot. He continues to work on weight loos and improving his blood sugars.   Objective Measures   Objective Measures Objective Measure 1   Objective Measure 1   Objective Measure LLIS: 12/72   Details volume: see measurements from 5/17/2023   Treatment Interventions (PT)   Interventions Manual Therapy;Self Care/Home Management   Manual Therapy   Manual Therapy: Mobilization, MFR, MLD, friction massage minutes ([id number]) 47   Skilled Intervention MLD, compression bandaging   Patient Response/Progress Tolerated well   Treatment Detail remeasurred volume. Pt supine throughout. MLD to R LE starting with inguinal node clearing to promote lymphatic flow. Applied Eucerin lotion to lower leg. Donned velcro compression. Then. applied 57nls8m short stretch wrap around foot and ankle. Ed to patient on doing this.   Plan   Plan for next session continue with weekly visits. Patient to resume daily compression pump (even if he feels the swelling isn't bad).   Comments   Comments Patient continues to have increased right dorsum of foot swelling when he is not wrapped by PT. We discussed today  that he could benefit from self wrapping his foot and his ankle (he was just trying a short wrap around just his arch). Patient verbalized understanding today, he will start trying this. We also discussed getting back to the compression pump daily (instead of just basing it on how the foot swelling looks). PT discussed with him that it could help the healing process along with him working on his elevation, compression, weight loss and blood sugar management. He is also to continue wearing his velcro compression garments under the foot/ankle short stretch wrapping. He does continue to benefit from further PT. We will re-cert his plan of care for another 3 months. Patient agrees with plan.   Total Session Time   Timed Code Treatment Minutes 47   Total Treatment Time (sum of timed and untimed services) 47   Medicare Claim Information   Medical diagnosis Venous stasis   Therapy diagnosis Right LE lymphedema secondary to venous stasis, right LE surgery, multiple infections   Start of care 10/17/22   Onset of illness / date of surgery 08/25/22   Certification date from 05/31/23

## 2023-06-01 RX ORDER — CLOTRIMAZOLE AND BETAMETHASONE DIPROPIONATE 10; .64 MG/G; MG/G
CREAM TOPICAL 2 TIMES DAILY
Qty: 50 G | Refills: 1 | Status: ON HOLD
Start: 2023-06-01 | End: 2023-08-07

## 2023-06-01 NOTE — TELEPHONE ENCOUNTER
Called in rx into [facility]. Will notify pt via MyC.    PASTORA OjedaN, RN  [facility]

## 2023-06-01 NOTE — TELEPHONE ENCOUNTER
Have him refill the cream Lotrisone cream 50 g apply twice daily refill x1; you may phone it in thank you

## 2023-06-05 NOTE — ADDENDUM NOTE
Encounter addended by: Jeri Lr PTA on: 6/5/2023 3:16 PM   Actions taken: Charge Capture section accepted

## 2023-06-08 ENCOUNTER — THERAPY VISIT (OUTPATIENT)
Dept: PHYSICAL THERAPY | Facility: REHABILITATION | Age: 78
End: 2023-06-08
Payer: COMMERCIAL

## 2023-06-08 DIAGNOSIS — L97.511 DIABETIC ULCER OF TOE OF RIGHT FOOT ASSOCIATED WITH DIABETES MELLITUS DUE TO UNDERLYING CONDITION, LIMITED TO BREAKDOWN OF SKIN (H): ICD-10-CM

## 2023-06-08 DIAGNOSIS — L03.115 CELLULITIS OF RIGHT LOWER EXTREMITY: ICD-10-CM

## 2023-06-08 DIAGNOSIS — I87.8 VENOUS STASIS: ICD-10-CM

## 2023-06-08 DIAGNOSIS — M62.81 GENERALIZED MUSCLE WEAKNESS: Primary | ICD-10-CM

## 2023-06-08 DIAGNOSIS — I89.0 LYMPHEDEMA: ICD-10-CM

## 2023-06-08 DIAGNOSIS — E08.621 DIABETIC ULCER OF TOE OF RIGHT FOOT ASSOCIATED WITH DIABETES MELLITUS DUE TO UNDERLYING CONDITION, LIMITED TO BREAKDOWN OF SKIN (H): ICD-10-CM

## 2023-06-08 PROCEDURE — 97140 MANUAL THERAPY 1/> REGIONS: CPT | Mod: GP | Performed by: PHYSICAL THERAPIST

## 2023-06-09 ENCOUNTER — TELEPHONE (OUTPATIENT)
Dept: VASCULAR SURGERY | Facility: CLINIC | Age: 78
End: 2023-06-09
Payer: COMMERCIAL

## 2023-06-09 NOTE — TELEPHONE ENCOUNTER
PT VIA EMAIL:     Per telephone conversation, I have attached a .jpg photo of the open wound on my right big toe.  Please show this to Dr. Nakul Salazar.  I am concerned because previous conditions were usually a scab, which Dr. Salazar debrided.  Now I am seeing flesh (no scab) around the wound and a green tint on the flesh.  I have an appointment with Dr Salazar on 06/14/2023.  If there is significant reason for concern and an earlier appointment available with Dr Salazar, please contact me.    Lance Ibrahim  [phone]  [email or web address]

## 2023-06-09 NOTE — TELEPHONE ENCOUNTER
Writer spoke with patient to discuss Dr. Salazar's note below:    Nakul Salazar, STAR  You 11 minutes ago (11:56 AM)     VS  Based on the picture I do not see any signs of infection.  I would asked the patient if he has any nausea vomiting fever chills.  If not he should keep an eye on the area for any signs of infection.  If there is concern for infection he should be seen in urgent care or the ED today.          Patient states that he is not experiencing and signs/symptoms of infection and no further concerns. He is comfortable waiting to be seen next week as previously scheduled. Writer instructed patient to watch for above and to present to ED/Urgent Care, if any concerns for infection arise prior to him being seen in clinic.

## 2023-06-14 ENCOUNTER — OFFICE VISIT (OUTPATIENT)
Dept: VASCULAR SURGERY | Facility: CLINIC | Age: 78
End: 2023-06-14
Attending: PODIATRIST
Payer: COMMERCIAL

## 2023-06-14 VITALS
DIASTOLIC BLOOD PRESSURE: 60 MMHG | SYSTOLIC BLOOD PRESSURE: 120 MMHG | RESPIRATION RATE: 18 BRPM | TEMPERATURE: 98.3 F | HEART RATE: 88 BPM

## 2023-06-14 DIAGNOSIS — L97.511 DIABETIC ULCER OF TOE OF RIGHT FOOT ASSOCIATED WITH DIABETES MELLITUS DUE TO UNDERLYING CONDITION, LIMITED TO BREAKDOWN OF SKIN (H): ICD-10-CM

## 2023-06-14 DIAGNOSIS — E08.621 DIABETIC ULCER OF TOE OF RIGHT FOOT ASSOCIATED WITH DIABETES MELLITUS DUE TO UNDERLYING CONDITION, LIMITED TO BREAKDOWN OF SKIN (H): ICD-10-CM

## 2023-06-14 DIAGNOSIS — L03.115 CELLULITIS OF RIGHT LOWER EXTREMITY: Primary | ICD-10-CM

## 2023-06-14 PROCEDURE — 11042 DBRDMT SUBQ TIS 1ST 20SQCM/<: CPT | Performed by: PODIATRIST

## 2023-06-14 PROCEDURE — 99214 OFFICE O/P EST MOD 30 MIN: CPT | Mod: 25 | Performed by: PODIATRIST

## 2023-06-14 PROCEDURE — G0463 HOSPITAL OUTPT CLINIC VISIT: HCPCS | Mod: 25 | Performed by: PODIATRIST

## 2023-06-14 PROCEDURE — 87070 CULTURE OTHR SPECIMN AEROBIC: CPT | Performed by: PODIATRIST

## 2023-06-14 RX ORDER — DOXYCYCLINE 100 MG/1
100 CAPSULE ORAL 2 TIMES DAILY
Qty: 20 CAPSULE | Refills: 0 | Status: ON HOLD | OUTPATIENT
Start: 2023-06-14 | End: 2023-08-07

## 2023-06-14 RX ORDER — ACETIC ACID 0.25 G/100ML
IRRIGANT IRRIGATION DAILY
Qty: 1000 ML | Refills: 1 | Status: ON HOLD | OUTPATIENT
Start: 2023-06-14 | End: 2023-08-07

## 2023-06-14 ASSESSMENT — PAIN SCALES - GENERAL: PAINLEVEL: NO PAIN (0)

## 2023-06-14 NOTE — PATIENT INSTRUCTIONS
Important lnstructions    Start antibiotics and continue until gone.    WEIGHT BEARING STATUS: You are to remain NON WEIGHT BEARING on your right foot. NON WEIGHT BEARING MEANS NO PRESSURE ON YOUR FOOT OR HEEL AT ANY TIME FOR ANY REASON!    2. OFFLOADING DEVICE: Must use a A ROLLING KNEE WALKER at all times! (do not use affected foot to push wheelchair)    3. STABILIZATION DEVICE: Use a  Darco Shoe  . You will need to WEAR THIS ANYTIME YOU ARE UP AND OUT OF BED, IT IS OKAY TO REMOVE WHEN YOU ARE SLEEPING..     4. ELEVATE: Elevating your leg means laying with your head on a pillow and your foot ABOVE YOUR HEART.     5. DO NOT MOVE YOUR FOOT.  There is a risk of worsening the wound or incision. To give yourself a higher chance of healing, please DO NOT swing foot back and forth and wiggle foot/toes especially when inside a stabilization device. Limited movement is allowable with therapy as recommended by the doctor.     6. TAKE A PROTEIN SHAKE TWICE A DAY.  (For ex: Boost, Ensure, Glucerna)      Dressing Change lnstructions        Cleanse your right great toe wound with acetic acid and cover with dry gauze dressing. Change daily.    It IS NOT ok to get your wound wet in the bath or shower    SEEK MEDICAL CARE IF:  You have an increase in swelling, pain, or redness around the wound.  You have an increase in the amount of pus coming from the wound.  There is a bad smell coming from the wound.  The wound appears to be worsening/enlarging  You have a fever greater than 101.5 F      It is ok to continue current wound care treatment/products for the next 2-3 days until new wound care supplies are ordered and arrive. If longer than this please contact our office at [phone].        We want to hear from you!   In the next few weeks, you should receive a call or email to complete a survey about your visit at [facility]. Please help us improve your appointment experience by letting us know how we did  today. We strive to make your experience good and value any ways in which we could do better.      We value your input and suggestions.    Thank you for choosing the [facility]!

## 2023-06-14 NOTE — PROGRESS NOTES
FOOT AND ANKLE SURGERY/PODIATRY Progress Note      ASSESSMENT:   Cellulitis right foot  Diabetic Ulceration right hallux        TREATMENT:  -I discussed with the patient that the ulceration on the right hallux is increased in size in the previous visit along with localized erythema.  I am concerned about a localized infection to the right hallux with the presence of Pseudomonas.    -We will culture obtained today.  I will start him on doxycycline.    -Also reviewed that excessive undermining is due to excessive weightbearing on the right foot.  Again stressed the importance of nonweightbearing on the right foot and risks with excessive walking including need for surgical intervention including amputation of the hallux or first ray.    -After discussion of risk factors, nursing staff removed dressing, cleansed wound and consent obtained 2% Lidocaine HCL jelly was applied, under clean conditions, the right hallux ulceration(s) were debrided using #15 blade scalpel.  Devitalized and nonviable tissue, along with any fibrin and slough, was removed to improve granulation tissue formation, stimulate wound healing, decrease overall bacteria load, disrupt biofilm formation and decrease edge senescence. Wound drainage was scant No. Total excisional debridement was 1.5 sq cm into the subcutaneous tissue with a depth of 0.2 cm.   Ulcers were improved afterwards and .  Measures were as noted on the flow sheet.  Acetic acid applied today with gauze dressing.  I recommend he continue with acetic acid dressing x1 week and then resume use of endoform.    -He will follow-up with me in 1 week    [facility] [place]      HPI: Lance Ibrahim was seen again today for concerns related to an infection in his right great toe.  Patient has noticed increasing drainage and a green discoloration along the skin margin.  He continues to ambulate on the right foot more than recommended.    Past Medical  "History:   Diagnosis Date     Arthritis     osteoarthritis knees     BPH      CAD (coronary artery disease)     subtotal occlusion in the small distal LAD      Cardiomyopathy      Cerebral artery occlusion with cerebral infarction     TIA 1993, no residual     Chronic kidney disease      Chronic rhinitis      HTN (hypertension)      Hyperlipidemia      Kidney stone      PVD (peripheral vascular disease)      Sleep apnea     doesn't use cpap     TIA (transient ischaemic attack) 1993     Type 2 diabetes mellitus - 11/08/2018     Ureteral stone        Past Surgical History:   Procedure Laterality Date     AMPUTATE TOE(S) Left 10/15/2018    Procedure: AMPUTATE TOE(S);  Left third toe amputation;  Surgeon: Ayaka Azevedo DPM, Podiatry/Foot and Ankle Surgery;  Location:  OR     ARTHROPLASTY KNEE Right 12/07/2018    Procedure: Right total knee arthroplasty;  Surgeon: Issa Cunha MD;  Location:  OR     COLONOSCOPY  03/09/2013    Procedure: COLONOSCOPY;  COLONOSCOPY;  Surgeon: Chau Hogan MD;  Location:  GI     CV HEART CATHETERIZATION WITH POSSIBLE INTERVENTION Left 03/05/2019    Procedure: Coronary Angiogram;  Surgeon: Nas Linda MD;  Location:  HEART CARDIAC CATH LAB     Diastasis recti repair  1985     EXTRACAPSULAR CATARACT EXTRATION WITH INTRAOCULAR LENS IMPLANT Left 03/13/2017     EXTRACAPSULAR CATARACT EXTRATION WITH INTRAOCULAR LENS IMPLANT Right      FOOT SURGERY  04/2013    cyst removal      HERNIA REPAIR  07/13/2004    ventral      ORIF Shoulder Left      Ventral hernia repair NOS  1987       Allergies   Allergen Reactions     Penicillins Anaphylaxis     8/25/22 - tolerated cefepime      Sulfa Antibiotics      \"itchy rash, swelling of face and hands\"     Ancef [Cefazolin] Rash         Current Outpatient Medications:      acetic acid 0.25 % irrigation, Irrigate with as directed daily Apply to gauze and let soak on wound for 5 minutes prior to dressing changes., Disp: 500 mL, Rfl: 3    "  amLODIPine (NORVASC) 5 MG tablet, Take 1 tablet (5 mg) by mouth daily, Disp: 90 tablet, Rfl: 3     aspirin (ASA) 81 MG EC tablet, Take by mouth every 24 hours, Disp: , Rfl:      atorvastatin (LIPITOR) 40 MG tablet, Take 1 tablet (40 mg) by mouth every 24 hours, Disp: 90 tablet, Rfl: 3     blood glucose monitoring (NO BRAND SPECIFIED) meter device kit, Use to test blood sugar 2 times daily or as directed., Disp: 1 kit, Rfl: 0     clotrimazole-betamethasone (LOTRISONE) 1-0.05 % external cream, Apply topically 2 times daily, Disp: 50 g, Rfl: 1     colchicine (COLCYRS) 0.6 MG tablet, TAKE 1 TABLET DAILY, Disp: 90 tablet, Rfl: 3     CONTOUR NEXT TEST test strip, USE TO TEST BLOOD SUGAR 2 TIMES DAILY OR AS DIRECTED., Disp: 100 strip, Rfl: 0     doxycycline monohydrate (MONODOX) 100 MG capsule, Take 1 capsule (100 mg) by mouth 2 times daily, Disp: 20 capsule, Rfl: 0     Ferrous Gluconate 240 (27 Fe) MG TABS, Take 1 tablet by mouth daily , Disp: , Rfl:      finasteride (PROSCAR) 5 MG tablet, Take by mouth every 24 hours, Disp: , Rfl:      fluticasone (FLONASE) 50 MCG/ACT nasal spray, Spray 1-2 sprays in nostril every 24 hours, Disp: , Rfl:      gabapentin (NEURONTIN) 300 MG capsule, Take 1 capsule (300 mg) by mouth 3 times daily, Disp: 270 capsule, Rfl: 0     isosorbide mononitrate (IMDUR) 30 MG 24 hr tablet, Take 1 tablet (30 mg) by mouth daily, Disp: 90 tablet, Rfl: 3     labetalol (NORMODYNE) 200 MG tablet, TAKE 1 TABLET BY MOUTH TWICE A DAY, Disp: 180 tablet, Rfl: 2     Lidocaine (LIDOCARE) 4 % Patch, Place 1 patch onto the skin every 24 hours To prevent lidocaine toxicity, patient should be patch free for 12 hrs daily. (Patient taking differently: Place 1 patch onto the skin every 24 hours To prevent lidocaine toxicity, patient should be patch free for 12 hrs daily.  To back every am), Disp: , Rfl:      lisinopril (ZESTRIL) 10 MG tablet, Take 1 tablet (10 mg) by mouth every 24 hours, Disp: 90 tablet, Rfl: 3      loratadine (CLARITIN) 10 MG tablet, Take by mouth every 24 hours, Disp: , Rfl:      metFORMIN (GLUCOPHAGE) 1000 MG tablet, TAKE 1 TABLET TWICE A DAY WITH MEALS, Disp: 180 tablet, Rfl: 3     Microlet Lancets MISC, USE TO TEST BLOOD SUGAR 2 TIMES DAILY OR AS DIRECTED., Disp: 100 each, Rfl: 1     multivitamin w/minerals (THERA-VIT-M) tablet, Take 1 tablet by mouth daily, Disp: , Rfl:      Probiotic Product (FORTIFY 30 BILLION PROBIOT 50+ PO), , Disp: , Rfl:      Semaglutide (RYBELSUS) 7 MG TABS, Take 7 mg by mouth daily, Disp: 90 tablet, Rfl: 3     tamsulosin (FLOMAX) 0.4 MG 24 hr capsule, Take 1 capsule by mouth daily., Disp: , Rfl:      torsemide (DEMADEX) 20 MG tablet, Take 1 tablet (20 mg) by mouth daily, Disp: 90 tablet, Rfl: 2     oxyCODONE (ROXICODONE) 5 MG tablet, TAKE 1 TABLET BY ORAL ROUTE EVERY 4 - 6 HOURS AS NEEDED FOR ACUTE PAIN., Disp: , Rfl:     Review of Systems - 10 point Review of Systems is negative except for right foot infection which is noted in HPI.      OBJECTIVE:  /60   Pulse 88   Temp 98.3  F (36.8  C)   Resp 18   General appearance: Patient is alert and fully cooperative with history & exam.  No sign of distress is noted during the visit.    Vascular: Dorsalis pedis palpableRight.  Dermatologic:    Wound 10/14/18 Foot Abrasion(s) GRAYISH DISCOLORATION (Active)       Wound 12/07/18 Left;Lower Foot Amputation blackened area third left toe amputation incision site (Active)       Wound 07/27/20 Left Toe (Comment  which one) Ulceration Left foot pinky toe (Active)       Wound 07/27/20 Right Toe (Comment  which one) Ulceration Right great toe wound  (Active)       Wound Toe (Comment  which one) Ulceration (Active)       Wound Foot Ulceration (Active)       Wound Foot Ulceration (Active)       Rash 10/21/18 1543 Bilateral other (see comments) (Active)       Rash 07/27/20 2015 Left lower leg other (see comments) (Active)       VASC Wound right hallux (Active)   Pre Size Length 1 06/14/23  0800   Pre Size Width 0.3 06/14/23 0800   Pre Size Depth 0.3 06/14/23 0800   Pre Total Sq cm 0.3 06/14/23 0800   Post Size Length 1.5 06/14/23 0800   Post Size Width 1 06/14/23 0800   Post Size Depth 0.2 06/14/23 0800   Post Total Sq cm 1.5 06/14/23 0800   Undermined 0.4 @ 8o'clock 10/06/22 1300   Description callous 05/02/23 1300       Incision/Surgical Site 10/15/18 Left Foot (Active)       Incision/Surgical Site 12/07/18 Right Knee (Active)   Granular base right hallux ulceration with significant undermining and green discoloration along the peripheral wound, localized erythema to the right hallux.  Neurologic: Diminished to light touch Right.  Musculoskeletal: Contracted digits noted Right.    Imaging:     No results found.       Picture:

## 2023-06-16 ENCOUNTER — HOSPITAL ENCOUNTER (OUTPATIENT)
Facility: CLINIC | Age: 78
End: 2023-06-16
Attending: ORTHOPAEDIC SURGERY | Admitting: ORTHOPAEDIC SURGERY
Payer: COMMERCIAL

## 2023-06-16 ENCOUNTER — TELEPHONE (OUTPATIENT)
Dept: VASCULAR SURGERY | Facility: CLINIC | Age: 78
End: 2023-06-16

## 2023-06-16 ENCOUNTER — THERAPY VISIT (OUTPATIENT)
Dept: PHYSICAL THERAPY | Facility: REHABILITATION | Age: 78
End: 2023-06-16
Payer: COMMERCIAL

## 2023-06-16 DIAGNOSIS — E11.621 DIABETIC ULCER OF TOE OF RIGHT FOOT ASSOCIATED WITH TYPE 2 DIABETES MELLITUS, UNSPECIFIED ULCER STAGE (H): ICD-10-CM

## 2023-06-16 DIAGNOSIS — L03.115 CELLULITIS OF RIGHT LOWER EXTREMITY: ICD-10-CM

## 2023-06-16 DIAGNOSIS — I87.8 VENOUS STASIS: ICD-10-CM

## 2023-06-16 DIAGNOSIS — L97.511 DIABETIC ULCER OF TOE OF RIGHT FOOT ASSOCIATED WITH DIABETES MELLITUS DUE TO UNDERLYING CONDITION, LIMITED TO BREAKDOWN OF SKIN (H): ICD-10-CM

## 2023-06-16 DIAGNOSIS — M62.81 GENERALIZED MUSCLE WEAKNESS: Primary | ICD-10-CM

## 2023-06-16 DIAGNOSIS — L97.519 DIABETIC ULCER OF TOE OF RIGHT FOOT ASSOCIATED WITH TYPE 2 DIABETES MELLITUS, UNSPECIFIED ULCER STAGE (H): ICD-10-CM

## 2023-06-16 DIAGNOSIS — I89.0 LYMPHEDEMA: ICD-10-CM

## 2023-06-16 DIAGNOSIS — E08.621 DIABETIC ULCER OF TOE OF RIGHT FOOT ASSOCIATED WITH DIABETES MELLITUS DUE TO UNDERLYING CONDITION, LIMITED TO BREAKDOWN OF SKIN (H): ICD-10-CM

## 2023-06-16 LAB — BACTERIA SPEC CULT: NORMAL

## 2023-06-16 PROCEDURE — 97140 MANUAL THERAPY 1/> REGIONS: CPT | Mod: CQ | Performed by: PHYSICAL THERAPY ASSISTANT

## 2023-06-16 RX ORDER — ACETIC ACID 0.25 G/100ML
IRRIGANT IRRIGATION DAILY
Qty: 500 ML | Refills: 3 | Status: ON HOLD | OUTPATIENT
Start: 2023-06-16 | End: 2023-08-07

## 2023-06-16 NOTE — TELEPHONE ENCOUNTER
Confirmed with [facility] in [place] that they have the acetic acid.  Order sent.  Patient updated.

## 2023-06-16 NOTE — TELEPHONE ENCOUNTER
Tiffanie with  Pharmacy states that they only have 1,000 mL available for acetic acid, not 500. Need more documentation/general statement that says something like 1,000 mL OK and will last for one month so she can document day supply for insurance purposed. PH: [phone]

## 2023-06-16 NOTE — TELEPHONE ENCOUNTER
Caller: Lance    Provider: STAR Salazar    Detailed reason for call: Lance left a VM requesting we send the RX for a acetic acid to our pharmacy in [facility]. Can said [place], and [facility] don't call it and its a hospital RX.    Best phone number to contact: [phone]    Best time to contact: any    Ok to leave a detailed message: Yes    Ok to speak to authorized person if needed: No      (Noted to patient if reason is related to wound or incision, to please send a photo via email or Saygentt.)

## 2023-06-20 ENCOUNTER — OFFICE VISIT (OUTPATIENT)
Dept: VASCULAR SURGERY | Facility: CLINIC | Age: 78
End: 2023-06-20
Attending: PODIATRIST
Payer: COMMERCIAL

## 2023-06-20 VITALS
HEART RATE: 87 BPM | TEMPERATURE: 97.7 F | OXYGEN SATURATION: 97 % | DIASTOLIC BLOOD PRESSURE: 64 MMHG | SYSTOLIC BLOOD PRESSURE: 118 MMHG

## 2023-06-20 DIAGNOSIS — L97.511 DIABETIC ULCER OF TOE OF RIGHT FOOT ASSOCIATED WITH DIABETES MELLITUS DUE TO UNDERLYING CONDITION, LIMITED TO BREAKDOWN OF SKIN (H): Primary | ICD-10-CM

## 2023-06-20 DIAGNOSIS — E08.621 DIABETIC ULCER OF TOE OF RIGHT FOOT ASSOCIATED WITH DIABETES MELLITUS DUE TO UNDERLYING CONDITION, LIMITED TO BREAKDOWN OF SKIN (H): Primary | ICD-10-CM

## 2023-06-20 PROCEDURE — 11042 DBRDMT SUBQ TIS 1ST 20SQCM/<: CPT | Performed by: PODIATRIST

## 2023-06-20 ASSESSMENT — PAIN SCALES - GENERAL: PAINLEVEL: NO PAIN (0)

## 2023-06-20 NOTE — PATIENT INSTRUCTIONS
Important lnstructions    STOP THE ACETIC ACID     WEIGHT BEARING STATUS: You are to remain NON WEIGHT BEARING on your right foot. NON WEIGHT BEARING MEANS NO PRESSURE ON YOUR FOOT OR HEEL AT ANY TIME FOR ANY REASON!    2. OFFLOADING DEVICE: Must use a A WHEELCHAIR at all times! (do not use affected foot to push wheelchair)      4. ELEVATE: Elevating your leg means laying with your head on a pillow and your foot ABOVE YOUR WAIST.     5. DO NOT MOVE YOUR FOOT.  There is a risk of worsening the wound or incision. To give yourself a higher chance of healing, please DO NOT swing foot back and forth and wiggle foot/toes especially when inside a stabilization device.        Dressing Change lnstructions            If you see green drainage from the wound- use the acetic acid. Otherwise no need to use.       - Dressing Application of  Endoform    1. Endoform should be cut to the size of the wound.  It should touch the edges of the ulcer. It is okay if it overlap the edges a small amount.  Then, moisten the Endoform with saline.(If it is easier for you, you can moisten it before laying it in the wound)    2. Cover the wound with Endoform, followed by dry gauze, and secure with roll gauze if needed.     3. Endoform is naturally used by the body over time so you may not find it in the wound when you change your dressing.  If you do find some, gently cleanse the wound with saline. Do not remove the remaining Endoform, which may appear as an off-white to diana gel, just add Endoform on top.  Usually, more Endoform will need to be added every 5-7 days.     4. Change your top dressing every 1-2 days or as needed depending on drainage.    -Endoform is a collagen dressing created from ovine (sheep) fore-stomach tissue. The collagen extracellular matrix transforms into a soft conforming sheet, which is naturally incorporated into the wound over time.  Collagen dressings are used to stimulate wound healing.   ,       It IS NOT ok to  get your wound wet in the bath or shower    SEEK MEDICAL CARE IF:  You have an increase in swelling, pain, or redness around the wound.  You have an increase in the amount of pus coming from the wound.  There is a bad smell coming from the wound.  The wound appears to be worsening/enlarging  You have a fever greater than 101.5 F      It is ok to continue current wound care treatment/products for the next 2-3 days until new wound care supplies are ordered and arrive. If longer than this please contact our office at [phone].        We want to hear from you!   In the next few weeks, you should receive a call or email to complete a survey about your visit at [facility]. Please help us improve your appointment experience by letting us know how we did today. We strive to make your experience good and value any ways in which we could do better.      We value your input and suggestions.    Thank you for choosing the [facility]!

## 2023-06-20 NOTE — PROGRESS NOTES
FOOT AND ANKLE SURGERY/PODIATRY Progress Note      ASSESSMENT: Diabetic Ulceration right hallux        TREATMENT:  -I discussed with the patient that there are no signs of infection on exam today.  Culture results reviewed.  I recommend he finish current course of doxycycline.    -He has not noticed green drainage at the site of the ulceration I recommend he discontinue use of acetic acid soaks at this time.    -After discussion of risk factors, nursing staff removed dressing, cleansed wound and consent obtained 2% Lidocaine HCL jelly was applied, under clean conditions, the right hallux ulceration(s) were debrided using currette or #15 blade scalpel.  Devitalized and nonviable tissue, along with any fibrin and slough, was removed to improve granulation tissue formation, stimulate wound healing, decrease overall bacteria load, disrupt biofilm formation and decrease edge senescence. Wound drainage was scant No. Total excisional debridement was 1.05 sq cm into the subcutaneous tissue with a depth of 0.2 cm.   Ulcers were improved afterwards and .  Measures were as noted on the flow sheet. Collagen with a gauze dresssing was applied. He will continue to apply Collagen with a gauze dresssing as directed.    -He will follow-up in 3 weeks    Nakul Salazar DPM  [facility] [place]      HPI: Lance Ibrahim was seen again today for a right hallux ulceration.  Patient reports he is taking antibiotic as directed and denies any new concerns.  He is also soaking with acetic acid as directed.    Past Medical History:   Diagnosis Date     Arthritis     osteoarthritis knees     BPH      CAD (coronary artery disease)     subtotal occlusion in the small distal LAD      Cardiomyopathy      Cerebral artery occlusion with cerebral infarction     TIA 1993, no residual     Chronic kidney disease      Chronic rhinitis      HTN (hypertension)      Hyperlipidemia      Kidney stone      PVD (peripheral vascular disease)   "    Sleep apnea     doesn't use cpap     TIA (transient ischaemic attack) 1993     Type 2 diabetes mellitus - 11/08/2018     Ureteral stone        Past Surgical History:   Procedure Laterality Date     AMPUTATE TOE(S) Left 10/15/2018    Procedure: AMPUTATE TOE(S);  Left third toe amputation;  Surgeon: Ayaka Azevedo DPM, Podiatry/Foot and Ankle Surgery;  Location: [facility] OR     ARTHROPLASTY KNEE Right 12/07/2018    Procedure: Right total knee arthroplasty;  Surgeon: Issa Cunha MD;  Location: [facility] OR     COLONOSCOPY  03/09/2013    Procedure: COLONOSCOPY;  COLONOSCOPY;  Surgeon: Chau Hogan MD;  Location:  GI     CV HEART CATHETERIZATION WITH POSSIBLE INTERVENTION Left 03/05/2019    Procedure: Coronary Angiogram;  Surgeon: Nas Linda MD;  Location:  HEART CARDIAC CATH LAB     Diastasis recti repair  1985     EXTRACAPSULAR CATARACT EXTRATION WITH INTRAOCULAR LENS IMPLANT Left 03/13/2017     EXTRACAPSULAR CATARACT EXTRATION WITH INTRAOCULAR LENS IMPLANT Right      FOOT SURGERY  04/2013    cyst removal      HERNIA REPAIR  07/13/2004    ventral      ORIF Shoulder Left      Ventral hernia repair NOS  1987       Allergies   Allergen Reactions     Penicillins Anaphylaxis     8/25/22 - tolerated cefepime      Sulfa Antibiotics      \"itchy rash, swelling of face and hands\"     Ancef [Cefazolin] Rash         Current Outpatient Medications:      acetic acid 0.25 % irrigation, Irrigate with as directed daily Apply to gauze and let soak on wound for 5 minutes prior to dressing changes., Disp: 500 mL, Rfl: 3     amLODIPine (NORVASC) 5 MG tablet, Take 1 tablet (5 mg) by mouth daily, Disp: 90 tablet, Rfl: 3     aspirin (ASA) 81 MG EC tablet, Take by mouth every 24 hours, Disp: , Rfl:      atorvastatin (LIPITOR) 40 MG tablet, Take 1 tablet (40 mg) by mouth every 24 hours, Disp: 90 tablet, Rfl: 3     blood glucose monitoring (NO BRAND SPECIFIED) meter device kit, Use to test blood sugar 2 times daily or as " directed., Disp: 1 kit, Rfl: 0     clotrimazole-betamethasone (LOTRISONE) 1-0.05 % external cream, Apply topically 2 times daily, Disp: 50 g, Rfl: 1     colchicine (COLCYRS) 0.6 MG tablet, TAKE 1 TABLET DAILY, Disp: 90 tablet, Rfl: 3     CONTOUR NEXT TEST test strip, USE TO TEST BLOOD SUGAR 2 TIMES DAILY OR AS DIRECTED., Disp: 100 strip, Rfl: 0     doxycycline monohydrate (MONODOX) 100 MG capsule, Take 1 capsule (100 mg) by mouth 2 times daily, Disp: 20 capsule, Rfl: 0     Ferrous Gluconate 240 (27 Fe) MG TABS, Take 1 tablet by mouth daily , Disp: , Rfl:      finasteride (PROSCAR) 5 MG tablet, Take by mouth every 24 hours, Disp: , Rfl:      fluticasone (FLONASE) 50 MCG/ACT nasal spray, Spray 1-2 sprays in nostril every 24 hours, Disp: , Rfl:      gabapentin (NEURONTIN) 300 MG capsule, Take 1 capsule (300 mg) by mouth 3 times daily, Disp: 270 capsule, Rfl: 0     isosorbide mononitrate (IMDUR) 30 MG 24 hr tablet, Take 1 tablet (30 mg) by mouth daily, Disp: 90 tablet, Rfl: 3     labetalol (NORMODYNE) 200 MG tablet, TAKE 1 TABLET BY MOUTH TWICE A DAY, Disp: 180 tablet, Rfl: 2     Lidocaine (LIDOCARE) 4 % Patch, Place 1 patch onto the skin every 24 hours To prevent lidocaine toxicity, patient should be patch free for 12 hrs daily. (Patient taking differently: Place 1 patch onto the skin every 24 hours To prevent lidocaine toxicity, patient should be patch free for 12 hrs daily.  To back every am), Disp: , Rfl:      lisinopril (ZESTRIL) 10 MG tablet, Take 1 tablet (10 mg) by mouth every 24 hours, Disp: 90 tablet, Rfl: 3     loratadine (CLARITIN) 10 MG tablet, Take by mouth every 24 hours, Disp: , Rfl:      metFORMIN (GLUCOPHAGE) 1000 MG tablet, TAKE 1 TABLET TWICE A DAY WITH MEALS, Disp: 180 tablet, Rfl: 3     Microlet Lancets MISC, USE TO TEST BLOOD SUGAR 2 TIMES DAILY OR AS DIRECTED., Disp: 100 each, Rfl: 1     multivitamin w/minerals (THERA-VIT-M) tablet, Take 1 tablet by mouth daily, Disp: , Rfl:      Probiotic  Product (FORTIFY 30 BILLION PROBIOT 50+ PO), , Disp: , Rfl:      Semaglutide (RYBELSUS) 7 MG TABS, Take 7 mg by mouth daily, Disp: 90 tablet, Rfl: 3     tamsulosin (FLOMAX) 0.4 MG 24 hr capsule, Take 1 capsule by mouth daily., Disp: , Rfl:      torsemide (DEMADEX) 20 MG tablet, Take 1 tablet (20 mg) by mouth daily, Disp: 90 tablet, Rfl: 2     acetic acid 0.25 % irrigation, Irrigate with as directed daily, Disp: 1000 mL, Rfl: 1     oxyCODONE (ROXICODONE) 5 MG tablet, TAKE 1 TABLET BY ORAL ROUTE EVERY 4 - 6 HOURS AS NEEDED FOR ACUTE PAIN., Disp: , Rfl:     Review of Systems - 10 point Review of Systems is negative except for right hallux ulcer which is noted in HPI.      OBJECTIVE:  /64   Pulse 87   Temp 97.7  F (36.5  C)   SpO2 97%   General appearance: Patient is alert and fully cooperative with history & exam.  No sign of distress is noted during the visit.    Vascular: Dorsalis pedis non-palpableRight.  Dermatologic:    Wound 10/14/18 Foot Abrasion(s) GRAYISH DISCOLORATION (Active)       Wound 12/07/18 Left;Lower Foot Amputation blackened area third left toe amputation incision site (Active)       Wound 07/27/20 Left Toe (Comment  which one) Ulceration Left foot pinky toe (Active)       Wound 07/27/20 Right Toe (Comment  which one) Ulceration Right great toe wound  (Active)       Wound Toe (Comment  which one) Ulceration (Active)       Wound Foot Ulceration (Active)       Wound Foot Ulceration (Active)       Rash 10/21/18 1543 Bilateral other (see comments) (Active)       Rash 07/27/20 2015 Left lower leg other (see comments) (Active)       VASC Wound right hallux (Active)   Pre Size Length 1.5 06/20/23 1300   Pre Size Width 0.7 06/20/23 1300   Pre Size Depth 0.3 06/20/23 1300   Pre Total Sq cm 1.05 06/20/23 1300   Post Size Length 1.5 06/14/23 0800   Post Size Width 1 06/14/23 0800   Post Size Depth 0.2 06/14/23 0800   Post Total Sq cm 1.5 06/14/23 0800   Undermined 0.4 @ 8o'clock 10/06/22 1300    Description callous 05/02/23 1300       Incision/Surgical Site 10/15/18 Left Foot (Active)       Incision/Surgical Site 12/07/18 Right Knee (Active)   Granular base right hallux with mild undermining, no erythema noted.  Neurologic: Diminished to light touch Right.  Musculoskeletal: Contracted digits noted right foot.    Imaging:     No results found.       Picture:

## 2023-06-30 ENCOUNTER — THERAPY VISIT (OUTPATIENT)
Dept: PHYSICAL THERAPY | Facility: REHABILITATION | Age: 78
End: 2023-06-30
Payer: COMMERCIAL

## 2023-06-30 DIAGNOSIS — L03.115 CELLULITIS OF RIGHT LOWER EXTREMITY: ICD-10-CM

## 2023-06-30 DIAGNOSIS — I89.0 LYMPHEDEMA: ICD-10-CM

## 2023-06-30 DIAGNOSIS — M62.81 GENERALIZED MUSCLE WEAKNESS: Primary | ICD-10-CM

## 2023-06-30 DIAGNOSIS — L97.511 DIABETIC ULCER OF TOE OF RIGHT FOOT ASSOCIATED WITH DIABETES MELLITUS DUE TO UNDERLYING CONDITION, LIMITED TO BREAKDOWN OF SKIN (H): ICD-10-CM

## 2023-06-30 DIAGNOSIS — I87.8 VENOUS STASIS: ICD-10-CM

## 2023-06-30 DIAGNOSIS — E08.621 DIABETIC ULCER OF TOE OF RIGHT FOOT ASSOCIATED WITH DIABETES MELLITUS DUE TO UNDERLYING CONDITION, LIMITED TO BREAKDOWN OF SKIN (H): ICD-10-CM

## 2023-06-30 PROCEDURE — 97140 MANUAL THERAPY 1/> REGIONS: CPT | Mod: CQ | Performed by: PHYSICAL THERAPY ASSISTANT

## 2023-07-19 ENCOUNTER — OFFICE VISIT (OUTPATIENT)
Dept: FAMILY MEDICINE | Facility: CLINIC | Age: 78
End: 2023-07-19
Payer: COMMERCIAL

## 2023-07-19 VITALS
BODY MASS INDEX: 30.08 KG/M2 | WEIGHT: 221.8 LBS | RESPIRATION RATE: 14 BRPM | DIASTOLIC BLOOD PRESSURE: 71 MMHG | TEMPERATURE: 97.8 F | HEART RATE: 87 BPM | SYSTOLIC BLOOD PRESSURE: 132 MMHG | OXYGEN SATURATION: 99 %

## 2023-07-19 DIAGNOSIS — E08.610 DIABETES MELLITUS DUE TO UNDERLYING CONDITION WITH DIABETIC NEUROPATHIC ARTHROPATHY, WITHOUT LONG-TERM CURRENT USE OF INSULIN (H): ICD-10-CM

## 2023-07-19 DIAGNOSIS — Z01.818 PREOP GENERAL PHYSICAL EXAM: Primary | ICD-10-CM

## 2023-07-19 LAB
HBA1C MFR BLD: 7.3 % (ref 0–5.6)
HGB BLD-MCNC: 10.2 G/DL (ref 13.3–17.7)

## 2023-07-19 PROCEDURE — 83036 HEMOGLOBIN GLYCOSYLATED A1C: CPT | Performed by: FAMILY MEDICINE

## 2023-07-19 PROCEDURE — 99214 OFFICE O/P EST MOD 30 MIN: CPT | Performed by: FAMILY MEDICINE

## 2023-07-19 PROCEDURE — 85018 HEMOGLOBIN: CPT | Performed by: FAMILY MEDICINE

## 2023-07-19 PROCEDURE — 36415 COLL VENOUS BLD VENIPUNCTURE: CPT | Performed by: FAMILY MEDICINE

## 2023-07-19 ASSESSMENT — PAIN SCALES - GENERAL: PAINLEVEL: NO PAIN (0)

## 2023-07-19 NOTE — PROGRESS NOTES
[facility]  [address]  [facility] [address]  Phone: [phone]  Fax: [phone]  Primary Provider: Yemi Adamson  Pre-op Performing Provider: YEMI ADAMSON      PREOPERATIVE EVALUATION:  Today's date: 7/19/2023    Lance Ibrahim is a 78 year old male who presents for a preoperative evaluation.      4/19/2023     9:59 AM   Additional Questions   Roomed by Elicia     Surgical Information:  Surgery/Procedure: Blepharoplasty procedure    Surgery Location: [facility]   Surgeon: DR HESS  Surgery Date: 7/20/2023  Time of Surgery: 5:30am  Where patient plans to recover: At home with family  Fax number for surgical facility:     Assessment & Plan     The proposed surgical procedure is considered LOW risk.    Preop general physical exam  Work-up as required  - HEMOGLOBIN A1C  - Hemoglobin    Diabetes mellitus due to underlying condition with diabetic neuropathic arthropathy, without long-term current use of insulin (H)  Patient may take his medications the day of the elective surgery - No identified additional risk factors other than previously addressed  - HEMOGLOBIN A1C              - No identified additional risk factors other than previously addressed        RECOMMENDATION:  APPROVAL GIVEN to proceed with proposed procedure, without further diagnostic evaluation.            Subjective       HPI related to upcoming procedure: Patient is having an elective blepharal plasty bilaterally for cosmesis.  He also is planning a left total knee arthroplasty in 1 month.  At that time we will have to return for a EKG and a new exam as it is outside of the window for preoperative clearance.  With regards to this elective eye surgery he has been therapeutically optimized for the anticipated procedure.  He is instructed to take his diabetes medications the day of surgery.  All medical questions asked were answered.  We wish him well        7/12/2023    11:29 AM   Preop Questions   1. Have  you ever had a heart attack or stroke? YES -    2. Have you ever had surgery on your heart or blood vessels, such as a stent placement, a coronary artery bypass, or surgery on an artery in your head, neck, heart, or legs? No   3. Do you have chest pain with activity? No   4. Do you have a history of  heart failure? No   5. Do you currently have a cold, bronchitis or symptoms of other infection? No   6. Do you have a cough, shortness of breath, or wheezing? No   7. Do you or anyone in your family have previous history of blood clots? No   8. Do you or does anyone in your family have a serious bleeding problem such as prolonged bleeding following surgeries or cuts? No   9. Have you ever had problems with anemia or been told to take iron pills? YES -    10. Have you had any abnormal blood loss such as black, tarry or bloody stools? No   11. Have you ever had a blood transfusion? No   12. Are you willing to have a blood transfusion if it is medically needed before, during, or after your surgery? Yes   13. Have you or any of your relatives ever had problems with anesthesia? No   14. Do you have sleep apnea, excessive snoring or daytime drowsiness? No   15. Do you have any artifical heart valves or other implanted medical devices like a pacemaker, defibrillator, or continuous glucose monitor? No   16. Do you have artificial joints? YES -    17. Are you allergic to latex? No       Health Care Directive:  Patient has a Health Care Directive on file      Preoperative Review of :   reviewed - no record of controlled substances prescribed.          Review of Systems  CONSTITUTIONAL: NEGATIVE for fever, chills, change in weight  INTEGUMENTARY/SKIN: NEGATIVE for worrisome rashes, moles or lesions  EYES: NEGATIVE for vision changes or irritation  ENT/MOUTH: NEGATIVE for ear, mouth and throat problems  RESP: NEGATIVE for significant cough or SOB  CV: NEGATIVE for chest pain, palpitations or peripheral edema  GI: NEGATIVE for  nausea, abdominal pain, heartburn, or change in bowel habits  : NEGATIVE for frequency, dysuria, or hematuria  MUSCULOSKELETAL: NEGATIVE for significant arthralgias or myalgia  NEURO: NEGATIVE for weakness, dizziness or paresthesias  ENDOCRINE: NEGATIVE for temperature intolerance, skin/hair changes  HEME: NEGATIVE for bleeding problems  PSYCHIATRIC: NEGATIVE for changes in mood or affect    Patient Active Problem List    Diagnosis Date Noted     Type 2 diabetes, HbA1c goal < 7% (H) 02/22/2013     Priority: High     Hypertension 07/06/2008     Priority: High     Transient cerebral ischemia 07/09/2004     Priority: High     Problem list name updated by automated process. 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) 10/17/2022     Priority: Medium     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 10/14/2022     Priority: Medium     Cellulitis of right lower extremity 08/25/2022     Priority: Medium     H/O amputation of lesser toe, left (H) 01/26/2022     Priority: Medium     Acute idiopathic gout of right hand 12/27/2021     Priority: Medium     Other constipation 12/09/2021     Priority: Medium     Acute renal insufficiency 12/03/2021     Priority: Medium     Acute right-sided thoracic back pain 12/03/2021     Priority: Medium     Ulcerated, foot, unspecified laterality, limited to breakdown of skin (H) 07/28/2021     Priority: Medium     Ulcerated, foot (H) 07/27/2020     Priority: Medium     Obesity (BMI 35.0-39.9) with comorbidity (H) 02/05/2020     Priority: Medium     CKD (chronic kidney disease) stage 3, GFR 30-59 ml/min (H) 02/05/2020     Priority: Medium      Shortness of breath 03/04/2019     Priority: Medium     Added automatically from request for surgery [id number]       Renal colic 03/04/2019     Priority: Medium     Dyspnea on exertion 02/22/2019     Priority: Medium     Added automatically from request for surgery [id number]       Chest pain, unspecified type 02/22/2019     Priority: Medium     Added automatically from request for surgery [id number]       Cardiomyopathy, unspecified type (H) 02/22/2019     Priority: Medium     Added automatically from request for surgery [id number]       Skin rash 12/08/2018     Priority: Medium     Total knee replacement status 12/07/2018     Priority: Medium     Amputated toe, left (H) 11/08/2018     Priority: Medium     Cervicalgia 02/24/2017     Priority: Medium     Lower urinary tract symptoms 04/07/2016     Priority: Medium     Type II diabetes mellitus (H) 07/09/2013     Priority: Medium     Gouty arthropathy 07/09/2013     Priority: Medium     CARDIOVASCULAR SCREENING; LDL GOAL LESS THAN 100 10/31/2010     Priority: Medium     Hypertrophy of prostate with urinary obstruction 07/09/2004     Priority: Medium     Problem list name updated by automated process. Provider to review       Ventral hernia 06/28/2004     Priority: Medium     Problem list name updated by automated process. Provider to review       Chronic low back pain 01/01/2000     Priority: Medium     Gout 07/20/2012     Priority: Low     Chronic rhinitis 07/09/2004     Priority: Low      Past Medical History:   Diagnosis Date     Arthritis     osteoarthritis knees     BPH      CAD (coronary artery disease)     subtotal occlusion in the small distal LAD      Cardiomyopathy      Cerebral artery occlusion with cerebral infarction     TIA 1993, no residual     Chronic kidney disease      Chronic rhinitis      HTN (hypertension)      Hyperlipidemia      Kidney stone      PVD (peripheral vascular disease)      Sleep apnea     doesn't use cpap     TIA (transient ischaemic attack) 1993      Type 2 diabetes mellitus - 11/08/2018     Ureteral stone      Past Surgical History:   Procedure Laterality Date     AMPUTATE TOE(S) Left 10/15/2018    Procedure: AMPUTATE TOE(S);  Left third toe amputation;  Surgeon: Ayaka Azevedo DPM, Podiatry/Foot and Ankle Surgery;  Location: [facility] OR     ARTHROPLASTY KNEE Right 12/07/2018    Procedure: Right total knee arthroplasty;  Surgeon: Issa Cunha MD;  Location: [facility] OR     COLONOSCOPY  03/09/2013    Procedure: COLONOSCOPY;  COLONOSCOPY;  Surgeon: Chau Hogan MD;  Location:  GI     CV HEART CATHETERIZATION WITH POSSIBLE INTERVENTION Left 03/05/2019    Procedure: Coronary Angiogram;  Surgeon: Nas Linda MD;  Location:  HEART CARDIAC CATH LAB     Diastasis recti repair  1985     EXTRACAPSULAR CATARACT EXTRATION WITH INTRAOCULAR LENS IMPLANT Left 03/13/2017     EXTRACAPSULAR CATARACT EXTRATION WITH INTRAOCULAR LENS IMPLANT Right      FOOT SURGERY  04/2013    cyst removal      HERNIA REPAIR  07/13/2004    ventral      ORIF Shoulder Left      Ventral hernia repair NOS  1987     Current Outpatient Medications   Medication Sig Dispense Refill     acetic acid 0.25 % irrigation Irrigate with as directed daily Apply to gauze and let soak on wound for 5 minutes prior to dressing changes. 500 mL 3     acetic acid 0.25 % irrigation Irrigate with as directed daily 1000 mL 1     amLODIPine (NORVASC) 5 MG tablet Take 1 tablet (5 mg) by mouth daily 90 tablet 3     aspirin (ASA) 81 MG EC tablet Take by mouth every 24 hours       atorvastatin (LIPITOR) 40 MG tablet Take 1 tablet (40 mg) by mouth every 24 hours 90 tablet 3     blood glucose monitoring (NO BRAND SPECIFIED) meter device kit Use to test blood sugar 2 times daily or as directed. 1 kit 0     clotrimazole-betamethasone (LOTRISONE) 1-0.05 % external cream Apply topically 2 times daily 50 g 1     colchicine (COLCYRS) 0.6 MG tablet TAKE 1 TABLET DAILY 90 tablet 3     CONTOUR NEXT TEST test strip USE TO TEST  BLOOD SUGAR 2 TIMES DAILY OR AS DIRECTED. 100 strip 0     doxycycline monohydrate (MONODOX) 100 MG capsule Take 1 capsule (100 mg) by mouth 2 times daily 20 capsule 0     Ferrous Gluconate 240 (27 Fe) MG TABS Take 1 tablet by mouth daily        finasteride (PROSCAR) 5 MG tablet Take by mouth every 24 hours       fluticasone (FLONASE) 50 MCG/ACT nasal spray Spray 1-2 sprays in nostril every 24 hours       gabapentin (NEURONTIN) 300 MG capsule Take 1 capsule (300 mg) by mouth 3 times daily 270 capsule 0     isosorbide mononitrate (IMDUR) 30 MG 24 hr tablet Take 1 tablet (30 mg) by mouth daily 90 tablet 3     labetalol (NORMODYNE) 200 MG tablet TAKE 1 TABLET BY MOUTH TWICE A  tablet 2     Lidocaine (LIDOCARE) 4 % Patch Place 1 patch onto the skin every 24 hours To prevent lidocaine toxicity, patient should be patch free for 12 hrs daily. (Patient taking differently: Place 1 patch onto the skin every 24 hours To prevent lidocaine toxicity, patient should be patch free for 12 hrs daily.  To back every am)       lisinopril (ZESTRIL) 10 MG tablet Take 1 tablet (10 mg) by mouth every 24 hours 90 tablet 3     loratadine (CLARITIN) 10 MG tablet Take by mouth every 24 hours       metFORMIN (GLUCOPHAGE) 1000 MG tablet TAKE 1 TABLET TWICE A DAY WITH MEALS 180 tablet 3     Microlet Lancets MISC USE TO TEST BLOOD SUGAR 2 TIMES DAILY OR AS DIRECTED. 100 each 1     multivitamin w/minerals (THERA-VIT-M) tablet Take 1 tablet by mouth daily       oxyCODONE (ROXICODONE) 5 MG tablet TAKE 1 TABLET BY ORAL ROUTE EVERY 4 - 6 HOURS AS NEEDED FOR ACUTE PAIN.       Probiotic Product (FORTIFY 30 BILLION PROBIOT 50+ PO)        Semaglutide (RYBELSUS) 7 MG TABS Take 7 mg by mouth daily 90 tablet 3     tamsulosin (FLOMAX) 0.4 MG 24 hr capsule Take 1 capsule by mouth daily.       torsemide (DEMADEX) 20 MG tablet Take 1 tablet (20 mg) by mouth daily 90 tablet 2       Allergies   Allergen Reactions     Penicillins Anaphylaxis     8/25/22 -  "tolerated cefepime      Sulfa Antibiotics      \"itchy rash, swelling of face and hands\"     Ancef [Cefazolin] Rash        Social History     Tobacco Use     Smoking status: Former     Packs/day: 0.00     Types: Cigarettes     Quit date: 3/17/1993     Years since quittin.3     Smokeless tobacco: Never   Substance Use Topics     Alcohol use: Not Currently       History   Drug Use No         Objective     There were no vitals taken for this visit.    Physical Exam    GENERAL APPEARANCE: healthy, alert and no distress     EYES: EOMI,  PERRL     HENT: ear canals and TM's normal and nose and mouth without ulcers or lesions     NECK: no adenopathy, no asymmetry, masses, or scars and thyroid normal to palpation     RESP: lungs clear to auscultation - no rales, rhonchi or wheezes     CV: regular rates and rhythm, normal S1 S2, no S3 or S4 and no murmur, click or rub     ABDOMEN:  soft, nontender, no HSM or masses and bowel sounds normal     MS: extremities normal- no gross deformities noted, no evidence of inflammation in joints, FROM in all extremities.     SKIN: no suspicious lesions or rashes     NEURO: Normal strength and tone, sensory exam grossly normal, mentation intact and speech normal     PSYCH: mentation appears normal. and affect normal/bright     LYMPHATICS: No cervical adenopathy    Recent Labs   Lab Test 23  1248 23  1033 09/15/22  1028 22  1145 22  0802 22  0741 22  0710 22  0934 21   HGB 12.5* 12.5*   < >  --   --   --   --    < > 12.7*    248   < >  --  430  --   --    < > 379   INR  --   --   --   --   --   --   --   --  1.17*     --   --   --   --   --  138   < > 136   POTASSIUM 4.3 5.0  --   --   --   --  4.6   < > 4.6   CR 2.25*  --   --   --  1.24   < > 1.23   < > 1.43*   A1C 7.7*  --   --  7.2*  --   --   --    < >  --     < > = values in this interval not displayed.        Diagnostics:  Labs pending at this time.  Results will be " reviewed when available.   No EKG required for low risk surgery (cataract, skin procedure, breast biopsy, etc).    Revised Cardiac Risk Index (RCRI):  The patient has the following serious cardiovascular risks for perioperative complications:   - No serious cardiac risks = 0 points     RCRI Interpretation: 0 points: Class I (very low risk - 0.4% complication rate)           Signed Electronically by: Rickey Adamson MD  Copy of this evaluation report is provided to requesting physician.

## 2023-07-19 NOTE — H&P (VIEW-ONLY)
[facility]  [address]  [facility] [address]  Phone: [phone]  Fax: [phone]  Primary Provider: Yemi Adamson  Pre-op Performing Provider: YEMI ADAMSON      PREOPERATIVE EVALUATION:  Today's date: 7/19/2023    Lance Ibrahim is a 78 year old male who presents for a preoperative evaluation.      4/19/2023     9:59 AM   Additional Questions   Roomed by Elicia     Surgical Information:  Surgery/Procedure: Blepharoplasty procedure    Surgery Location: [facility]   Surgeon: DR HESS  Surgery Date: 7/20/2023  Time of Surgery: 5:30am  Where patient plans to recover: At home with family  Fax number for surgical facility:     Assessment & Plan     The proposed surgical procedure is considered LOW risk.    Preop general physical exam  Work-up as required  - HEMOGLOBIN A1C  - Hemoglobin    Diabetes mellitus due to underlying condition with diabetic neuropathic arthropathy, without long-term current use of insulin (H)  Patient may take his medications the day of the elective surgery - No identified additional risk factors other than previously addressed  - HEMOGLOBIN A1C              - No identified additional risk factors other than previously addressed        RECOMMENDATION:  APPROVAL GIVEN to proceed with proposed procedure, without further diagnostic evaluation.            Subjective       HPI related to upcoming procedure: Patient is having an elective blepharal plasty bilaterally for cosmesis.  He also is planning a left total knee arthroplasty in 1 month.  At that time we will have to return for a EKG and a new exam as it is outside of the window for preoperative clearance.  With regards to this elective eye surgery he has been therapeutically optimized for the anticipated procedure.  He is instructed to take his diabetes medications the day of surgery.  All medical questions asked were answered.  We wish him well        7/12/2023    11:29 AM   Preop Questions   1. Have  you ever had a heart attack or stroke? YES -    2. Have you ever had surgery on your heart or blood vessels, such as a stent placement, a coronary artery bypass, or surgery on an artery in your head, neck, heart, or legs? No   3. Do you have chest pain with activity? No   4. Do you have a history of  heart failure? No   5. Do you currently have a cold, bronchitis or symptoms of other infection? No   6. Do you have a cough, shortness of breath, or wheezing? No   7. Do you or anyone in your family have previous history of blood clots? No   8. Do you or does anyone in your family have a serious bleeding problem such as prolonged bleeding following surgeries or cuts? No   9. Have you ever had problems with anemia or been told to take iron pills? YES -    10. Have you had any abnormal blood loss such as black, tarry or bloody stools? No   11. Have you ever had a blood transfusion? No   12. Are you willing to have a blood transfusion if it is medically needed before, during, or after your surgery? Yes   13. Have you or any of your relatives ever had problems with anesthesia? No   14. Do you have sleep apnea, excessive snoring or daytime drowsiness? No   15. Do you have any artifical heart valves or other implanted medical devices like a pacemaker, defibrillator, or continuous glucose monitor? No   16. Do you have artificial joints? YES -    17. Are you allergic to latex? No       Health Care Directive:  Patient has a Health Care Directive on file      Preoperative Review of :   reviewed - no record of controlled substances prescribed.          Review of Systems  CONSTITUTIONAL: NEGATIVE for fever, chills, change in weight  INTEGUMENTARY/SKIN: NEGATIVE for worrisome rashes, moles or lesions  EYES: NEGATIVE for vision changes or irritation  ENT/MOUTH: NEGATIVE for ear, mouth and throat problems  RESP: NEGATIVE for significant cough or SOB  CV: NEGATIVE for chest pain, palpitations or peripheral edema  GI: NEGATIVE for  nausea, abdominal pain, heartburn, or change in bowel habits  : NEGATIVE for frequency, dysuria, or hematuria  MUSCULOSKELETAL: NEGATIVE for significant arthralgias or myalgia  NEURO: NEGATIVE for weakness, dizziness or paresthesias  ENDOCRINE: NEGATIVE for temperature intolerance, skin/hair changes  HEME: NEGATIVE for bleeding problems  PSYCHIATRIC: NEGATIVE for changes in mood or affect    Patient Active Problem List    Diagnosis Date Noted     Type 2 diabetes, HbA1c goal < 7% (H) 02/22/2013     Priority: High     Hypertension 07/06/2008     Priority: High     Transient cerebral ischemia 07/09/2004     Priority: High     Problem list name updated by automated process. 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) 10/17/2022     Priority: Medium     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 10/14/2022     Priority: Medium     Cellulitis of right lower extremity 08/25/2022     Priority: Medium     H/O amputation of lesser toe, left (H) 01/26/2022     Priority: Medium     Acute idiopathic gout of right hand 12/27/2021     Priority: Medium     Other constipation 12/09/2021     Priority: Medium     Acute renal insufficiency 12/03/2021     Priority: Medium     Acute right-sided thoracic back pain 12/03/2021     Priority: Medium     Ulcerated, foot, unspecified laterality, limited to breakdown of skin (H) 07/28/2021     Priority: Medium     Ulcerated, foot (H) 07/27/2020     Priority: Medium     Obesity (BMI 35.0-39.9) with comorbidity (H) 02/05/2020     Priority: Medium     CKD (chronic kidney disease) stage 3, GFR 30-59 ml/min (H) 02/05/2020     Priority: Medium      Shortness of breath 03/04/2019     Priority: Medium     Added automatically from request for surgery [id number]       Renal colic 03/04/2019     Priority: Medium     Dyspnea on exertion 02/22/2019     Priority: Medium     Added automatically from request for surgery [id number]       Chest pain, unspecified type 02/22/2019     Priority: Medium     Added automatically from request for surgery [id number]       Cardiomyopathy, unspecified type (H) 02/22/2019     Priority: Medium     Added automatically from request for surgery [id number]       Skin rash 12/08/2018     Priority: Medium     Total knee replacement status 12/07/2018     Priority: Medium     Amputated toe, left (H) 11/08/2018     Priority: Medium     Cervicalgia 02/24/2017     Priority: Medium     Lower urinary tract symptoms 04/07/2016     Priority: Medium     Type II diabetes mellitus (H) 07/09/2013     Priority: Medium     Gouty arthropathy 07/09/2013     Priority: Medium     CARDIOVASCULAR SCREENING; LDL GOAL LESS THAN 100 10/31/2010     Priority: Medium     Hypertrophy of prostate with urinary obstruction 07/09/2004     Priority: Medium     Problem list name updated by automated process. Provider to review       Ventral hernia 06/28/2004     Priority: Medium     Problem list name updated by automated process. Provider to review       Chronic low back pain 01/01/2000     Priority: Medium     Gout 07/20/2012     Priority: Low     Chronic rhinitis 07/09/2004     Priority: Low      Past Medical History:   Diagnosis Date     Arthritis     osteoarthritis knees     BPH      CAD (coronary artery disease)     subtotal occlusion in the small distal LAD      Cardiomyopathy      Cerebral artery occlusion with cerebral infarction     TIA 1993, no residual     Chronic kidney disease      Chronic rhinitis      HTN (hypertension)      Hyperlipidemia      Kidney stone      PVD (peripheral vascular disease)      Sleep apnea     doesn't use cpap     TIA (transient ischaemic attack) 1993      Type 2 diabetes mellitus - 11/08/2018     Ureteral stone      Past Surgical History:   Procedure Laterality Date     AMPUTATE TOE(S) Left 10/15/2018    Procedure: AMPUTATE TOE(S);  Left third toe amputation;  Surgeon: Ayaka Azevedo DPM, Podiatry/Foot and Ankle Surgery;  Location: [facility] OR     ARTHROPLASTY KNEE Right 12/07/2018    Procedure: Right total knee arthroplasty;  Surgeon: Issa Cunha MD;  Location: [facility] OR     COLONOSCOPY  03/09/2013    Procedure: COLONOSCOPY;  COLONOSCOPY;  Surgeon: Chau Hogan MD;  Location:  GI     CV HEART CATHETERIZATION WITH POSSIBLE INTERVENTION Left 03/05/2019    Procedure: Coronary Angiogram;  Surgeon: Nas Linda MD;  Location:  HEART CARDIAC CATH LAB     Diastasis recti repair  1985     EXTRACAPSULAR CATARACT EXTRATION WITH INTRAOCULAR LENS IMPLANT Left 03/13/2017     EXTRACAPSULAR CATARACT EXTRATION WITH INTRAOCULAR LENS IMPLANT Right      FOOT SURGERY  04/2013    cyst removal      HERNIA REPAIR  07/13/2004    ventral      ORIF Shoulder Left      Ventral hernia repair NOS  1987     Current Outpatient Medications   Medication Sig Dispense Refill     acetic acid 0.25 % irrigation Irrigate with as directed daily Apply to gauze and let soak on wound for 5 minutes prior to dressing changes. 500 mL 3     acetic acid 0.25 % irrigation Irrigate with as directed daily 1000 mL 1     amLODIPine (NORVASC) 5 MG tablet Take 1 tablet (5 mg) by mouth daily 90 tablet 3     aspirin (ASA) 81 MG EC tablet Take by mouth every 24 hours       atorvastatin (LIPITOR) 40 MG tablet Take 1 tablet (40 mg) by mouth every 24 hours 90 tablet 3     blood glucose monitoring (NO BRAND SPECIFIED) meter device kit Use to test blood sugar 2 times daily or as directed. 1 kit 0     clotrimazole-betamethasone (LOTRISONE) 1-0.05 % external cream Apply topically 2 times daily 50 g 1     colchicine (COLCYRS) 0.6 MG tablet TAKE 1 TABLET DAILY 90 tablet 3     CONTOUR NEXT TEST test strip USE TO TEST  BLOOD SUGAR 2 TIMES DAILY OR AS DIRECTED. 100 strip 0     doxycycline monohydrate (MONODOX) 100 MG capsule Take 1 capsule (100 mg) by mouth 2 times daily 20 capsule 0     Ferrous Gluconate 240 (27 Fe) MG TABS Take 1 tablet by mouth daily        finasteride (PROSCAR) 5 MG tablet Take by mouth every 24 hours       fluticasone (FLONASE) 50 MCG/ACT nasal spray Spray 1-2 sprays in nostril every 24 hours       gabapentin (NEURONTIN) 300 MG capsule Take 1 capsule (300 mg) by mouth 3 times daily 270 capsule 0     isosorbide mononitrate (IMDUR) 30 MG 24 hr tablet Take 1 tablet (30 mg) by mouth daily 90 tablet 3     labetalol (NORMODYNE) 200 MG tablet TAKE 1 TABLET BY MOUTH TWICE A  tablet 2     Lidocaine (LIDOCARE) 4 % Patch Place 1 patch onto the skin every 24 hours To prevent lidocaine toxicity, patient should be patch free for 12 hrs daily. (Patient taking differently: Place 1 patch onto the skin every 24 hours To prevent lidocaine toxicity, patient should be patch free for 12 hrs daily.  To back every am)       lisinopril (ZESTRIL) 10 MG tablet Take 1 tablet (10 mg) by mouth every 24 hours 90 tablet 3     loratadine (CLARITIN) 10 MG tablet Take by mouth every 24 hours       metFORMIN (GLUCOPHAGE) 1000 MG tablet TAKE 1 TABLET TWICE A DAY WITH MEALS 180 tablet 3     Microlet Lancets MISC USE TO TEST BLOOD SUGAR 2 TIMES DAILY OR AS DIRECTED. 100 each 1     multivitamin w/minerals (THERA-VIT-M) tablet Take 1 tablet by mouth daily       oxyCODONE (ROXICODONE) 5 MG tablet TAKE 1 TABLET BY ORAL ROUTE EVERY 4 - 6 HOURS AS NEEDED FOR ACUTE PAIN.       Probiotic Product (FORTIFY 30 BILLION PROBIOT 50+ PO)        Semaglutide (RYBELSUS) 7 MG TABS Take 7 mg by mouth daily 90 tablet 3     tamsulosin (FLOMAX) 0.4 MG 24 hr capsule Take 1 capsule by mouth daily.       torsemide (DEMADEX) 20 MG tablet Take 1 tablet (20 mg) by mouth daily 90 tablet 2       Allergies   Allergen Reactions     Penicillins Anaphylaxis     8/25/22 -  "tolerated cefepime      Sulfa Antibiotics      \"itchy rash, swelling of face and hands\"     Ancef [Cefazolin] Rash        Social History     Tobacco Use     Smoking status: Former     Packs/day: 0.00     Types: Cigarettes     Quit date: 3/17/1993     Years since quittin.3     Smokeless tobacco: Never   Substance Use Topics     Alcohol use: Not Currently       History   Drug Use No         Objective     There were no vitals taken for this visit.    Physical Exam    GENERAL APPEARANCE: healthy, alert and no distress     EYES: EOMI,  PERRL     HENT: ear canals and TM's normal and nose and mouth without ulcers or lesions     NECK: no adenopathy, no asymmetry, masses, or scars and thyroid normal to palpation     RESP: lungs clear to auscultation - no rales, rhonchi or wheezes     CV: regular rates and rhythm, normal S1 S2, no S3 or S4 and no murmur, click or rub     ABDOMEN:  soft, nontender, no HSM or masses and bowel sounds normal     MS: extremities normal- no gross deformities noted, no evidence of inflammation in joints, FROM in all extremities.     SKIN: no suspicious lesions or rashes     NEURO: Normal strength and tone, sensory exam grossly normal, mentation intact and speech normal     PSYCH: mentation appears normal. and affect normal/bright     LYMPHATICS: No cervical adenopathy    Recent Labs   Lab Test 23  1248 23  1033 09/15/22  1028 22  1145 22  0802 22  0741 22  0710 22  0934 21   HGB 12.5* 12.5*   < >  --   --   --   --    < > 12.7*    248   < >  --  430  --   --    < > 379   INR  --   --   --   --   --   --   --   --  1.17*     --   --   --   --   --  138   < > 136   POTASSIUM 4.3 5.0  --   --   --   --  4.6   < > 4.6   CR 2.25*  --   --   --  1.24   < > 1.23   < > 1.43*   A1C 7.7*  --   --  7.2*  --   --   --    < >  --     < > = values in this interval not displayed.        Diagnostics:  Labs pending at this time.  Results will be " reviewed when available.   No EKG required for low risk surgery (cataract, skin procedure, breast biopsy, etc).    Revised Cardiac Risk Index (RCRI):  The patient has the following serious cardiovascular risks for perioperative complications:   - No serious cardiac risks = 0 points     RCRI Interpretation: 0 points: Class I (very low risk - 0.4% complication rate)           Signed Electronically by: Rickey Adamson MD  Copy of this evaluation report is provided to requesting physician.

## 2023-07-25 ENCOUNTER — OFFICE VISIT (OUTPATIENT)
Dept: VASCULAR SURGERY | Facility: CLINIC | Age: 78
End: 2023-07-25
Attending: PODIATRIST
Payer: COMMERCIAL

## 2023-07-25 VITALS — DIASTOLIC BLOOD PRESSURE: 66 MMHG | OXYGEN SATURATION: 98 % | SYSTOLIC BLOOD PRESSURE: 121 MMHG | HEART RATE: 75 BPM

## 2023-07-25 DIAGNOSIS — L03.119 CELLULITIS AND ABSCESS OF FOOT EXCLUDING TOE: Primary | ICD-10-CM

## 2023-07-25 DIAGNOSIS — L97.514 DIABETIC ULCER OF TOE OF RIGHT FOOT ASSOCIATED WITH DIABETES MELLITUS DUE TO UNDERLYING CONDITION, WITH NECROSIS OF BONE (H): ICD-10-CM

## 2023-07-25 DIAGNOSIS — L02.619 CELLULITIS AND ABSCESS OF FOOT EXCLUDING TOE: Primary | ICD-10-CM

## 2023-07-25 DIAGNOSIS — E08.621 DIABETIC ULCER OF TOE OF RIGHT FOOT ASSOCIATED WITH DIABETES MELLITUS DUE TO UNDERLYING CONDITION, WITH NECROSIS OF BONE (H): ICD-10-CM

## 2023-07-25 PROCEDURE — 11044 DBRDMT BONE 1ST 20 SQ CM/<: CPT | Performed by: PODIATRIST

## 2023-07-25 PROCEDURE — G0463 HOSPITAL OUTPT CLINIC VISIT: HCPCS | Performed by: PODIATRIST

## 2023-07-25 PROCEDURE — 87077 CULTURE AEROBIC IDENTIFY: CPT | Performed by: PODIATRIST

## 2023-07-25 PROCEDURE — 99214 OFFICE O/P EST MOD 30 MIN: CPT | Mod: 25 | Performed by: PODIATRIST

## 2023-07-25 RX ORDER — DOXYCYCLINE 100 MG/1
100 CAPSULE ORAL 2 TIMES DAILY
Qty: 20 CAPSULE | Refills: 0 | Status: ON HOLD | OUTPATIENT
Start: 2023-07-25 | End: 2023-08-07

## 2023-07-25 ASSESSMENT — PAIN SCALES - GENERAL: PAINLEVEL: NO PAIN (0)

## 2023-07-25 NOTE — PATIENT INSTRUCTIONS
Dr Salazar will call with your MRI results and follow up plan.    Gauze dressing applied today. Cleanse your right great toe wound with normal saline, cover with 4x4 gauze and secure with tape. NO tape on skin. Change this dressing every other day.    Start taking your antibiotics and take until gone.

## 2023-07-26 ENCOUNTER — HOSPITAL ENCOUNTER (OUTPATIENT)
Dept: MRI IMAGING | Facility: HOSPITAL | Age: 78
Discharge: HOME | End: 2023-07-26
Attending: PODIATRIST | Admitting: PODIATRIST
Payer: COMMERCIAL

## 2023-07-26 DIAGNOSIS — E08.621 DIABETIC ULCER OF TOE OF RIGHT FOOT ASSOCIATED WITH DIABETES MELLITUS DUE TO UNDERLYING CONDITION, WITH NECROSIS OF BONE (H): ICD-10-CM

## 2023-07-26 DIAGNOSIS — L02.619 CELLULITIS AND ABSCESS OF FOOT EXCLUDING TOE: ICD-10-CM

## 2023-07-26 DIAGNOSIS — L97.514 DIABETIC ULCER OF TOE OF RIGHT FOOT ASSOCIATED WITH DIABETES MELLITUS DUE TO UNDERLYING CONDITION, WITH NECROSIS OF BONE (H): ICD-10-CM

## 2023-07-26 DIAGNOSIS — L03.119 CELLULITIS AND ABSCESS OF FOOT EXCLUDING TOE: ICD-10-CM

## 2023-07-26 PROCEDURE — 255N000002 HC RX 255 OP 636: Mod: JZ | Performed by: PODIATRIST

## 2023-07-26 PROCEDURE — 73720 MRI LWR EXTREMITY W/O&W/DYE: CPT | Mod: RT

## 2023-07-26 PROCEDURE — A9585 GADOBUTROL INJECTION: HCPCS | Mod: JZ | Performed by: PODIATRIST

## 2023-07-26 RX ORDER — GADOBUTROL 604.72 MG/ML
10 INJECTION INTRAVENOUS ONCE
Status: COMPLETED | OUTPATIENT
Start: 2023-07-26 | End: 2023-07-26

## 2023-07-26 RX ADMIN — GADOBUTROL 10 ML: 604.72 INJECTION INTRAVENOUS at 10:54

## 2023-07-27 ENCOUNTER — TELEPHONE (OUTPATIENT)
Dept: VASCULAR SURGERY | Facility: CLINIC | Age: 78
End: 2023-07-27
Payer: COMMERCIAL

## 2023-07-27 ENCOUNTER — VIRTUAL VISIT (OUTPATIENT)
Dept: VASCULAR SURGERY | Facility: CLINIC | Age: 78
End: 2023-07-27
Payer: COMMERCIAL

## 2023-07-27 ENCOUNTER — PREP FOR PROCEDURE (OUTPATIENT)
Dept: OTHER | Facility: CLINIC | Age: 78
End: 2023-07-27
Payer: COMMERCIAL

## 2023-07-27 DIAGNOSIS — M86.9 OSTEOMYELITIS OF ANKLE AND FOOT (H): Primary | ICD-10-CM

## 2023-07-27 PROCEDURE — 99214 OFFICE O/P EST MOD 30 MIN: CPT | Mod: 95 | Performed by: PODIATRIST

## 2023-07-27 NOTE — PROGRESS NOTES
FOOT AND ANKLE SURGERY/PODIATRY Progress Note      ASSESSMENT:   Osteomyelitis right hallux  Cellulitis right foot  Diabetic Ulceration right hallux     Type of service:  Telephone Visit       Telephone Start and End Time : 11:58/12:11    Originating Location (pt. Location):  home      Distant Location :  [facility]       Mode of Communication: Telephone    TREATMENT:  MRI right foot: 1.  Soft tissue thinning in the plantar aspect of the great toe, suspicious for an ulcer, likely clinically evident.   2.  Severe bone marrow edema throughout the distal and proximal phalanges of the great toe, compatible with osteomyelitis. Relative sparing of the minimal portion of the base of the proximal phalanx.   3.  Soft tissue edema and enhancement in the great toe compatible with cellulitis. No abscess or overt phlegmon. No other fluid collection; no septic arthritis or tenosynovitis evident.   4.  Severe degenerative arthrosis at the first MTP joint.   5.  Completely torn and retracted flexor hallucis longus tendon. There is only a small amount of tenosynovial fluid in the tendon sheath, but there is moderate tenosynovial enhancement; the appearance raises concern for concern for septic tenosynovitis,   given the proximity of the tendon sheath to the infection at the distal aspect of the great toe.   6.  No evidence of ligament tear.   7.  Additional nonspecific soft tissue edema throughout the foot, greatest in the dorsal foot.    -Due to the presence of osteomyelitis, I reviewed the treatment options to include either 6 weeks IV antibiotics with Infectious Disease/surgical debridement with removal of bone/use of wound vac/non-weight bearing vs amputation of the involved bone.  Based on the above, I recommend amputation of the right hallux in the presence of extensive osteomyelitis in both the proximal distal phalanges of the right hallux.  We discussed the surgical procedure including  postoperative course to remain nonweightbearing.  I recommend hospitalization postop with transfer to TCU to assist with nonweightbearing to the postoperative period.  Risk reviewed to include dehiscence of the surgical site and need for additional surgical intervention including partial foot amputation.  All questions invited and answered.  Patient consents to surgery.    -He will continue with doxycycline    -I will asked my office to coordinate surgery at either [facility] or [facility] within the next 10 days    Nakul Salazar DPM  [facility]      HPI: Lance Ibrahim was seen again today for telephone visit to discuss recent right foot MRI report. Patient is taking doxycycline as directed.    Past Medical History:   Diagnosis Date    Arthritis     osteoarthritis knees    BPH     CAD (coronary artery disease)     subtotal occlusion in the small distal LAD     Cardiomyopathy     Cerebral artery occlusion with cerebral infarction     TIA 1993, no residual    Chronic kidney disease     Chronic rhinitis     HTN (hypertension)     Hyperlipidemia     Kidney stone     PVD (peripheral vascular disease)     Sleep apnea     doesn't use cpap    TIA (transient ischaemic attack) 1993    Type 2 diabetes mellitus - 11/08/2018    Ureteral stone        Past Surgical History:   Procedure Laterality Date    AMPUTATE TOE(S) Left 10/15/2018    Procedure: AMPUTATE TOE(S);  Left third toe amputation;  Surgeon: Ayaka Azevedo DPM, Podiatry/Foot and Ankle Surgery;  Location:  OR    ARTHROPLASTY KNEE Right 12/07/2018    Procedure: Right total knee arthroplasty;  Surgeon: Issa Cunha MD;  Location:  OR    COLONOSCOPY  03/09/2013    Procedure: COLONOSCOPY;  COLONOSCOPY;  Surgeon: Chau Hogan MD;  Location:  GI    CV HEART CATHETERIZATION WITH POSSIBLE INTERVENTION Left 03/05/2019    Procedure: Coronary Angiogram;  Surgeon: Nas Linda MD;  Location:  HEART CARDIAC CATH LAB     "Diastasis recti repair  1985    EXTRACAPSULAR CATARACT EXTRATION WITH INTRAOCULAR LENS IMPLANT Left 03/13/2017    EXTRACAPSULAR CATARACT EXTRATION WITH INTRAOCULAR LENS IMPLANT Right     FOOT SURGERY  04/2013    cyst removal     HERNIA REPAIR  07/13/2004    ventral     ORIF Shoulder Left     Ventral hernia repair NOS  1987       Allergies   Allergen Reactions    Penicillins Anaphylaxis     8/25/22 - tolerated cefepime     Sulfa Antibiotics      \"itchy rash, swelling of face and hands\"    Ancef [Cefazolin] Rash         Current Outpatient Medications:     acetic acid 0.25 % irrigation, Irrigate with as directed daily Apply to gauze and let soak on wound for 5 minutes prior to dressing changes., Disp: 500 mL, Rfl: 3    amLODIPine (NORVASC) 5 MG tablet, Take 1 tablet (5 mg) by mouth daily, Disp: 90 tablet, Rfl: 3    aspirin (ASA) 81 MG EC tablet, Take by mouth every 24 hours, Disp: , Rfl:     atorvastatin (LIPITOR) 40 MG tablet, Take 1 tablet (40 mg) by mouth every 24 hours, Disp: 90 tablet, Rfl: 3    blood glucose monitoring (NO BRAND SPECIFIED) meter device kit, Use to test blood sugar 2 times daily or as directed., Disp: 1 kit, Rfl: 0    clotrimazole-betamethasone (LOTRISONE) 1-0.05 % external cream, Apply topically 2 times daily, Disp: 50 g, Rfl: 1    colchicine (COLCYRS) 0.6 MG tablet, TAKE 1 TABLET DAILY, Disp: 90 tablet, Rfl: 3    CONTOUR NEXT TEST test strip, USE TO TEST BLOOD SUGAR 2 TIMES DAILY OR AS DIRECTED., Disp: 100 strip, Rfl: 0    doxycycline monohydrate (MONODOX) 100 MG capsule, Take 1 capsule (100 mg) by mouth 2 times daily, Disp: 20 capsule, Rfl: 0    Ferrous Gluconate 240 (27 Fe) MG TABS, Take 1 tablet by mouth daily , Disp: , Rfl:     finasteride (PROSCAR) 5 MG tablet, Take by mouth every 24 hours, Disp: , Rfl:     fluticasone (FLONASE) 50 MCG/ACT nasal spray, Spray 1-2 sprays in nostril every 24 hours, Disp: , Rfl:     gabapentin (NEURONTIN) 300 MG capsule, Take 1 capsule (300 mg) by mouth 3 times " daily, Disp: 270 capsule, Rfl: 0    isosorbide mononitrate (IMDUR) 30 MG 24 hr tablet, Take 1 tablet (30 mg) by mouth daily, Disp: 90 tablet, Rfl: 3    labetalol (NORMODYNE) 200 MG tablet, TAKE 1 TABLET BY MOUTH TWICE A DAY, Disp: 180 tablet, Rfl: 2    Lidocaine (LIDOCARE) 4 % Patch, Place 1 patch onto the skin every 24 hours To prevent lidocaine toxicity, patient should be patch free for 12 hrs daily. (Patient taking differently: Place 1 patch onto the skin every 24 hours To prevent lidocaine toxicity, patient should be patch free for 12 hrs daily.  To back every am), Disp: , Rfl:     lisinopril (ZESTRIL) 10 MG tablet, Take 1 tablet (10 mg) by mouth every 24 hours, Disp: 90 tablet, Rfl: 3    loratadine (CLARITIN) 10 MG tablet, Take by mouth every 24 hours, Disp: , Rfl:     metFORMIN (GLUCOPHAGE) 1000 MG tablet, TAKE 1 TABLET TWICE A DAY WITH MEALS, Disp: 180 tablet, Rfl: 3    Microlet Lancets MISC, USE TO TEST BLOOD SUGAR 2 TIMES DAILY OR AS DIRECTED., Disp: 100 each, Rfl: 1    multivitamin w/minerals (THERA-VIT-M) tablet, Take 1 tablet by mouth daily, Disp: , Rfl:     Probiotic Product (FORTIFY 30 BILLION PROBIOT 50+ PO), , Disp: , Rfl:     Semaglutide (RYBELSUS) 7 MG TABS, Take 7 mg by mouth daily, Disp: 90 tablet, Rfl: 3    tamsulosin (FLOMAX) 0.4 MG 24 hr capsule, Take 1 capsule by mouth daily., Disp: , Rfl:     torsemide (DEMADEX) 20 MG tablet, Take 1 tablet (20 mg) by mouth daily, Disp: 90 tablet, Rfl: 2    acetic acid 0.25 % irrigation, Irrigate with as directed daily, Disp: 1000 mL, Rfl: 1    doxycycline monohydrate (MONODOX) 100 MG capsule, Take 1 capsule (100 mg) by mouth 2 times daily, Disp: 20 capsule, Rfl: 0    oxyCODONE (ROXICODONE) 5 MG tablet, TAKE 1 TABLET BY ORAL ROUTE EVERY 4 - 6 HOURS AS NEEDED FOR ACUTE PAIN., Disp: , Rfl:     Review of Systems - 10 point Review of Systems is negative except for osteomyelitis right foot which is noted in HPI.    Imaging:     MR Foot Right w/o & w  Contrast    Result Date: 7/26/2023  EXAM: MR FOOT RIGHT W/O and W CONTRAST LOCATION: [facility] DATE: 7/26/2023 INDICATION: Great toe ulcer, infection, concern for osteomyelitis. COMPARISON: None. TECHNIQUE: Routine. Additional postgadolinium T1 sequences were obtained. IV CONTRAST: 10ml Gadavist FINDINGS: Moderately reduced image quality due to motion artifact on multiple sequences. Severe bone marrow edema and enhancement in the phalanges of the great toe, effacing the normal T1 fat signal, throughout the distal phalanx and throughout the majority of the proximal phalanx (with minimal sparing of the proximal phalanx base), is compatible with acute osteomyelitis. The skin/soft tissue ulcer is difficult to visualize, but is favored to be along the plantar surface of the great toe, with areas of mild surface irregularity and soft tissue thinning. No fluid-filled tract is visualized. No phlegmon or abscess. There is soft tissue edema and enhancement in the great toe, consistent with cellulitis. No joint effusion for clear evidence of septic arthritis. No evidence of reactive osteitis or osteomyelitis in the right forefoot elsewhere. No fracture. Mild bone marrow edema in the head and neck of the fifth metatarsal, favored to represent stress reaction, although contusion may appear similar. Severe degenerative arthrosis is present at the first MTP joint, with chronic bone-on-bone articulation, subchondral cystic change, and large osteophytes. Joint fluid is normal. No findings to indicate septic arthritis. Joint alignment within normal limits. No joint effusions in the lesser MTP joints in the toes, or in the visualized midfoot. Full-thickness tear of the flexor hallucis longus tendon, which is retracted to the level of the metatarsal (coronal images 1-17). There is only a small amount of tendon sheath fluid within the flexor tendon sheath, but there is moderately prominent tenosynovial  enhancement; given the appearance in the proximity of the tendon sheath to the infection in the distal aspect of the great toe, appearance is concerning for possible septic tenosynovitis The other flexor tendons are intact. The extensor tendons appear intact. No tenosynovial effusion/tenosynovitis. The Lisfranc ligament is visualized and is intact. Collateral ligaments are intact. Marked soft tissue edema over the dorsum of the forefoot.     IMPRESSION: 1.  Soft tissue thinning in the plantar aspect of the great toe, suspicious for an ulcer, likely clinically evident. 2.  Severe bone marrow edema throughout the distal and proximal phalanges of the great toe, compatible with osteomyelitis. Relative sparing of the minimal portion of the base of the proximal phalanx. 3.  Soft tissue edema and enhancement in the great toe compatible with cellulitis. No abscess or overt phlegmon. No other fluid collection; no septic arthritis or tenosynovitis evident. 4.  Severe degenerative arthrosis at the first MTP joint. 5.  Completely torn and retracted flexor hallucis longus tendon. There is only a small amount of tenosynovial fluid in the tendon sheath, but there is moderate tenosynovial enhancement; the appearance raises concern for concern for septic tenosynovitis, given the proximity of the tendon sheath to the infection at the distal aspect of the great toe. 6.  No evidence of ligament tear. 7.  Additional nonspecific soft tissue edema throughout the foot, greatest in the dorsal foot.

## 2023-07-27 NOTE — TELEPHONE ENCOUNTER
Surgery Scheduled      Surgery/Procedure: Amputation, right hallux    Special Equipment: pulse lavage    Location: [facility]:  [facility] [place] [address] (phone: [phone], Fax: [phone])    Date: 08/07/2023    Time: 2:05PM    Admission Type: Inpatient    Surgeon: Dr. Salazar    OR Confirmed & :  Yes with Patti on 7/27/2023    Post Op: 08/15/2023    Wound Vac Needed: No    Home Care Needed: No Transfer to TCU      Blood Thinners Addressed: No    Pre-op physical completed 07/19/2023

## 2023-07-27 NOTE — PATIENT INSTRUCTIONS
Lance,    Your surgery with [facility] has been scheduled. Please read thoroughly and follow instructions.     Procedure:   right hallux amputation  Procedure Date :   TBD  *A surgery nurse will call you 1-2 days before surgery to inform you of the time of arrival for surgery.  Surgeon:   Dr. Nakul Salazar  Admission Type:   Inpatient  Procedure Location:   [facility]        Preparation Instructions to complete:    []  You will need a Pre-op Physical within 30 days before surgery with your primary care provider. This exam is required by the anesthesiologist to ensure a safe surgical experience.    Failure  to obtain your pre-op physical will lead to cancellation of your surgery  Call them right away to schedule this. Please ensure your Preoperative Physical is faxed to the Hospital (numbers listed above)    [] Preoperative Medication Instructions  We would like you to stop your anticoagulation medications 3-5 days before surgery HOWEVER contact your prescribing provider for instructions before discontinuing any medications. (Examples: Coumadin, Plavix, Xarelto, Eliquis, Pradaxa, Effient, Brilinta) If you are on Coumadin, we would like the goal INR ? 1.2.  IT IS OK TO STAY ON ASPIRIN PRIOR TO SURGERY.   Hold Ibuprofen, Herbal Supplements and Vitamin E 7 days before  Stop Cialis, Levitra and Viagra 2-3 days before surgery    [] Fasting Requirements  Nothing to eat for 8 hours before surgery unless instructed differently by the surgery nurse.  You may have clear liquids up to two hours before your arrival time (coffee, tea, water, or Gatorade. No cream or milk)  If your primary care provider has instructed you to continue oral medications, you may take them on the morning of surgery with a small sip of water.    No alcohol or smoking after 12:00am the day of surgery    []  COVID-19 test is no longer needed  If you are experiencing COVID symptoms or have tested positive for COVID-19 within 14  days of procedure date, reach out to the care team to reschedule please.     [] Contact your insurance regarding coverage  If you would like a Good Myrna Estimate for your upcoming procedure at [facility] Location, contact Cost of Care Estimates   Advocates are available Monday through Friday 8am - 5pm   [phone]  You may also submit a request online through your [employer] account.  For all self-pay, estimate, or anesthesia billing questions at [facility], the contact information is below:  Who to contact: Shreya EDWARD  [facility] number: [phone]  Prepay number: [phone]    [] DO NOT BRING FMLA WITH TO SURGERY.  These should be sent to the provider's office by fax to [phone].     [] Day of Surgery  Medications - Take as indicated with sips of water.   Wear comfortable loose fitting clothes. Wear your glasses-Not contacts. Do not wear jewelry and remove body piercing's. Surgery may be cancelled if they are not removed.   You may have 1 family member wait in the lobby during your surgery. Visitor restrictions are subject to change. Please verify with the surgery nurse when they call.   If same day surgery-Have a someone come with you to surgery that can help you understand the surgeon's instructions, drive you home and stay with you overnight the first night.    [] If the community sees a new COVID-19 surge, your procedure may need to be postponed. We will contact you if this happens.    [] You will receive a call from a surgery nurse 1-3 days prior to surgery. They will go over more details with you.             ** AFTER SURGERY INSTRUCTIONS **      [] You are to remain NON WEIGHT BEARING on your right foot NON WEIGHT BEARING MEANS NO PRESSURE ON YOUR FOOT OR HEEL AT ANY TIME FOR ANY REASON!    [] Prior to surgery you will be given a CAM BOOT which you will need to WEAR THIS ANYTIME YOU ARE UP AND OUT OF BED, IT IS OKAY TO REMOVE WHEN YOU ARE SLEEPING. Please  bring this with you on the day of surgery.    [] You will be given a A ROLLING KNEE WALKER and A WHEELCHAIR to help you ambulate after surgery. Please also bring this with you on the day of surgery.    [] During surgery Dr. Salazar will apply a gauze dressing to your foot. This will remain intact until you are seen in clinic for follow up    [] It is NOT OKAY to get your surgical site wet while bathing, you will need to purchase a cast cover to protect your foot from getting wet. You can purchase this at [facility] or your local pharmacy.    [] It is important that you elevate your affected foot after surgery. Proper elevation is raising your foot above your waist. The fluid in your lower extremities needs to get back to your heart so it can get pumped to your kidneys and expelled through urination. So if you notice you have to go to the bathroom more frequently when you are elevating your leg this is a good sign that it is working.     [] It is important that you increase your protein intake after surgery. Protein is essential for wound healing. We recommend you take a protein supplement twice per day. This is in addition to your normal diet. Examples of protein supplements are Ensure, Boost, Glucerna (if you are diabetic), or protein powder. You can purchase these at your local retailer or grocery store.       Notify our office right away, if you have any changes in your health status, or if you develop a cold, flu, diarrhea, infection, fever or sore throat before your scheduled surgery date. We can be reached at [phone]   Monday-Friday 8 am-4:30 pm if you have any questions.     Thank you for trusting us with your care!

## 2023-07-28 DIAGNOSIS — L03.119 CELLULITIS AND ABSCESS OF FOOT EXCLUDING TOE: Primary | ICD-10-CM

## 2023-07-28 DIAGNOSIS — L02.619 CELLULITIS AND ABSCESS OF FOOT EXCLUDING TOE: Primary | ICD-10-CM

## 2023-07-28 LAB
BACTERIA SPEC CULT: ABNORMAL

## 2023-07-28 RX ORDER — LEVOFLOXACIN 500 MG/1
500 TABLET, FILM COATED ORAL DAILY
Qty: 10 TABLET | Refills: 0 | Status: ON HOLD | OUTPATIENT
Start: 2023-07-28 | End: 2023-08-07

## 2023-07-28 NOTE — RESULT ENCOUNTER NOTE
Spoke to patient and went over culture results and antibiotic directions per provider.  Patient states understanding.    Nakul Salazar, STAR  P [facility] Support Pool  Please ask patient to discontinue doxycycline and begin levaquin. Thanks

## 2023-07-30 ENCOUNTER — MYC REFILL (OUTPATIENT)
Dept: FAMILY MEDICINE | Facility: CLINIC | Age: 78
End: 2023-07-30
Payer: COMMERCIAL

## 2023-07-30 DIAGNOSIS — R14.0 ABDOMINAL BLOATING: Primary | ICD-10-CM

## 2023-07-30 NOTE — TELEPHONE ENCOUNTER
Routing refill request to provider for review/approval because:  Drug not on the [facility] refill protocol   Medication is reported/historical  No diagnosis code    Last Written Prescription Date:  ?  Last Fill Quantity: ?,  # refills: ?   Last office visit provider:  7/19/2023     Requested Prescriptions   Pending Prescriptions Disp Refills    Probiotic Product (FORTIFY 30 BILLION PROBIOT 50+ PO)         There is no refill protocol information for this order          Irene Feldman RN 07/30/23 1:57 PM

## 2023-08-02 RX ORDER — VIT C/ZINC GLUCONAT/ELDERBERRY 60 MG-5 MG
1 LOZENGE ORAL DAILY
Qty: 90 CAPSULE | Refills: 1 | Status: ON HOLD | OUTPATIENT
Start: 2023-08-02 | End: 2024-06-19

## 2023-08-02 NOTE — TELEPHONE ENCOUNTER
Routing refill request to provider for review/approval because:  Drug not on the [facility] refill protocol   Medication is reported/historical    Last Written Prescription Date:  12/15/2022  Last Fill Quantity: ?,  # refills: ?   Last office visit provider:  7/19/2023     Requested Prescriptions   Pending Prescriptions Disp Refills    Probiotic Product (FORTIFY 30 BILLION PROBIOT 50+) CPDR         There is no refill protocol information for this order          Areli Reyna RN 08/01/23 8:19 PM

## 2023-08-06 ENCOUNTER — ANESTHESIA EVENT (OUTPATIENT)
Dept: SURGERY | Facility: HOSPITAL | Age: 78
End: 2023-08-06
Payer: COMMERCIAL

## 2023-08-07 ENCOUNTER — HOSPITAL ENCOUNTER (OUTPATIENT)
Facility: HOSPITAL | Age: 78
Setting detail: OBSERVATION
Discharge: NURSING, REHAB OR LONG-TERM CARE | End: 2023-08-10
Attending: PODIATRIST | Admitting: PODIATRIST
Payer: COMMERCIAL

## 2023-08-07 ENCOUNTER — ANESTHESIA (OUTPATIENT)
Dept: SURGERY | Facility: HOSPITAL | Age: 78
End: 2023-08-07
Payer: COMMERCIAL

## 2023-08-07 DIAGNOSIS — L97.514 DIABETIC ULCER OF TOE OF RIGHT FOOT ASSOCIATED WITH DIABETES MELLITUS DUE TO UNDERLYING CONDITION, WITH NECROSIS OF BONE (H): ICD-10-CM

## 2023-08-07 DIAGNOSIS — M10.00 IDIOPATHIC GOUT, UNSPECIFIED CHRONICITY, UNSPECIFIED SITE: ICD-10-CM

## 2023-08-07 DIAGNOSIS — R14.0 ABDOMINAL BLOATING: ICD-10-CM

## 2023-08-07 DIAGNOSIS — E08.621 DIABETIC ULCER OF TOE OF RIGHT FOOT ASSOCIATED WITH DIABETES MELLITUS DUE TO UNDERLYING CONDITION, WITH NECROSIS OF BONE (H): ICD-10-CM

## 2023-08-07 DIAGNOSIS — M86.9 OSTEOMYELITIS OF ANKLE AND FOOT (H): Primary | ICD-10-CM

## 2023-08-07 LAB
GLUCOSE BLDC GLUCOMTR-MCNC: 116 MG/DL (ref 70–99)
GRAM STAIN RESULT: NORMAL
GRAM STAIN RESULT: NORMAL

## 2023-08-07 PROCEDURE — 88311 DECALCIFY TISSUE: CPT | Mod: TC | Performed by: PODIATRIST

## 2023-08-07 PROCEDURE — 250N000011 HC RX IP 250 OP 636: Mod: JZ | Performed by: PODIATRIST

## 2023-08-07 PROCEDURE — 87075 CULTR BACTERIA EXCEPT BLOOD: CPT | Performed by: PODIATRIST

## 2023-08-07 PROCEDURE — 96360 HYDRATION IV INFUSION INIT: CPT | Mod: 59

## 2023-08-07 PROCEDURE — 250N000013 HC RX MED GY IP 250 OP 250 PS 637: Performed by: STUDENT IN AN ORGANIZED HEALTH CARE EDUCATION/TRAINING PROGRAM

## 2023-08-07 PROCEDURE — 250N000009 HC RX 250: Performed by: ANESTHESIOLOGY

## 2023-08-07 PROCEDURE — 250N000011 HC RX IP 250 OP 636: Mod: JZ | Performed by: ANESTHESIOLOGY

## 2023-08-07 PROCEDURE — 258N000003 HC RX IP 258 OP 636: Performed by: ANESTHESIOLOGY

## 2023-08-07 PROCEDURE — 999N000141 HC STATISTIC PRE-PROCEDURE NURSING ASSESSMENT: Performed by: PODIATRIST

## 2023-08-07 PROCEDURE — 272N000001 HC OR GENERAL SUPPLY STERILE: Performed by: PODIATRIST

## 2023-08-07 PROCEDURE — 250N000013 HC RX MED GY IP 250 OP 250 PS 637: Performed by: PODIATRIST

## 2023-08-07 PROCEDURE — 87070 CULTURE OTHR SPECIMN AEROBIC: CPT | Performed by: PODIATRIST

## 2023-08-07 PROCEDURE — 82962 GLUCOSE BLOOD TEST: CPT

## 2023-08-07 PROCEDURE — 87205 SMEAR GRAM STAIN: CPT | Performed by: PODIATRIST

## 2023-08-07 PROCEDURE — 250N000011 HC RX IP 250 OP 636: Performed by: STUDENT IN AN ORGANIZED HEALTH CARE EDUCATION/TRAINING PROGRAM

## 2023-08-07 PROCEDURE — 258N000003 HC RX IP 258 OP 636: Performed by: PODIATRIST

## 2023-08-07 PROCEDURE — 370N000017 HC ANESTHESIA TECHNICAL FEE, PER MIN: Performed by: PODIATRIST

## 2023-08-07 PROCEDURE — 360N000076 HC SURGERY LEVEL 3, PER MIN: Performed by: PODIATRIST

## 2023-08-07 PROCEDURE — 96361 HYDRATE IV INFUSION ADD-ON: CPT

## 2023-08-07 PROCEDURE — 99222 1ST HOSP IP/OBS MODERATE 55: CPT | Mod: AI | Performed by: STUDENT IN AN ORGANIZED HEALTH CARE EDUCATION/TRAINING PROGRAM

## 2023-08-07 PROCEDURE — 250N000011 HC RX IP 250 OP 636: Performed by: PODIATRIST

## 2023-08-07 PROCEDURE — 28820 AMPUTATION OF TOE: CPT | Mod: T5 | Performed by: PODIATRIST

## 2023-08-07 PROCEDURE — G0378 HOSPITAL OBSERVATION PER HR: HCPCS

## 2023-08-07 RX ORDER — NICOTINE POLACRILEX 4 MG
15-30 LOZENGE BUCCAL
Status: DISCONTINUED | OUTPATIENT
Start: 2023-08-07 | End: 2023-08-10 | Stop reason: HOSPADM

## 2023-08-07 RX ORDER — HYDROMORPHONE HCL IN WATER/PF 6 MG/30 ML
0.4 PATIENT CONTROLLED ANALGESIA SYRINGE INTRAVENOUS
Status: DISCONTINUED | OUTPATIENT
Start: 2023-08-07 | End: 2023-08-10 | Stop reason: HOSPADM

## 2023-08-07 RX ORDER — NALOXONE HYDROCHLORIDE 0.4 MG/ML
0.2 INJECTION, SOLUTION INTRAMUSCULAR; INTRAVENOUS; SUBCUTANEOUS
Status: DISCONTINUED | OUTPATIENT
Start: 2023-08-07 | End: 2023-08-10 | Stop reason: HOSPADM

## 2023-08-07 RX ORDER — AMLODIPINE BESYLATE 5 MG/1
5 TABLET ORAL DAILY
Status: DISCONTINUED | OUTPATIENT
Start: 2023-08-08 | End: 2023-08-10 | Stop reason: HOSPADM

## 2023-08-07 RX ORDER — PROCHLORPERAZINE MALEATE 5 MG
5 TABLET ORAL EVERY 6 HOURS PRN
Status: DISCONTINUED | OUTPATIENT
Start: 2023-08-07 | End: 2023-08-10 | Stop reason: HOSPADM

## 2023-08-07 RX ORDER — BUPIVACAINE HYDROCHLORIDE 5 MG/ML
INJECTION, SOLUTION PERINEURAL PRN
Status: DISCONTINUED | OUTPATIENT
Start: 2023-08-07 | End: 2023-08-07 | Stop reason: HOSPADM

## 2023-08-07 RX ORDER — ISOSORBIDE MONONITRATE 30 MG/1
30 TABLET, EXTENDED RELEASE ORAL DAILY
Status: DISCONTINUED | OUTPATIENT
Start: 2023-08-08 | End: 2023-08-10 | Stop reason: HOSPADM

## 2023-08-07 RX ORDER — HYDROMORPHONE HCL IN WATER/PF 6 MG/30 ML
0.2 PATIENT CONTROLLED ANALGESIA SYRINGE INTRAVENOUS
Status: DISCONTINUED | OUTPATIENT
Start: 2023-08-07 | End: 2023-08-10 | Stop reason: HOSPADM

## 2023-08-07 RX ORDER — ONDANSETRON 4 MG/1
4 TABLET, ORALLY DISINTEGRATING ORAL EVERY 6 HOURS PRN
Status: DISCONTINUED | OUTPATIENT
Start: 2023-08-07 | End: 2023-08-10 | Stop reason: HOSPADM

## 2023-08-07 RX ORDER — LIDOCAINE 40 MG/G
CREAM TOPICAL
Status: DISCONTINUED | OUTPATIENT
Start: 2023-08-07 | End: 2023-08-10 | Stop reason: HOSPADM

## 2023-08-07 RX ORDER — ONDANSETRON 2 MG/ML
4 INJECTION INTRAMUSCULAR; INTRAVENOUS EVERY 30 MIN PRN
Status: DISCONTINUED | OUTPATIENT
Start: 2023-08-07 | End: 2023-08-07 | Stop reason: HOSPADM

## 2023-08-07 RX ORDER — MULTIPLE VITAMINS W/ MINERALS TAB 9MG-400MCG
1 TAB ORAL DAILY
Status: DISCONTINUED | OUTPATIENT
Start: 2023-08-08 | End: 2023-08-10 | Stop reason: HOSPADM

## 2023-08-07 RX ORDER — SODIUM CHLORIDE, SODIUM LACTATE, POTASSIUM CHLORIDE, CALCIUM CHLORIDE 600; 310; 30; 20 MG/100ML; MG/100ML; MG/100ML; MG/100ML
INJECTION, SOLUTION INTRAVENOUS CONTINUOUS
Status: DISCONTINUED | OUTPATIENT
Start: 2023-08-07 | End: 2023-08-08

## 2023-08-07 RX ORDER — SODIUM CHLORIDE, SODIUM LACTATE, POTASSIUM CHLORIDE, AND CALCIUM CHLORIDE .6; .31; .03; .02 G/100ML; G/100ML; G/100ML; G/100ML
IRRIGANT IRRIGATION PRN
Status: DISCONTINUED | OUTPATIENT
Start: 2023-08-07 | End: 2023-08-07 | Stop reason: HOSPADM

## 2023-08-07 RX ORDER — ASPIRIN 81 MG/1
81 TABLET ORAL EVERY 24 HOURS
Status: DISCONTINUED | OUTPATIENT
Start: 2023-08-07 | End: 2023-08-08

## 2023-08-07 RX ORDER — ACETAMINOPHEN 325 MG/1
650 TABLET ORAL EVERY 4 HOURS PRN
Status: DISCONTINUED | OUTPATIENT
Start: 2023-08-10 | End: 2023-08-10 | Stop reason: HOSPADM

## 2023-08-07 RX ORDER — OXYCODONE HYDROCHLORIDE 5 MG/1
5 TABLET ORAL
Status: DISCONTINUED | OUTPATIENT
Start: 2023-08-07 | End: 2023-08-07 | Stop reason: HOSPADM

## 2023-08-07 RX ORDER — OXYCODONE HYDROCHLORIDE 5 MG/1
10 TABLET ORAL
Status: DISCONTINUED | OUTPATIENT
Start: 2023-08-07 | End: 2023-08-07 | Stop reason: HOSPADM

## 2023-08-07 RX ORDER — LISINOPRIL 5 MG/1
10 TABLET ORAL EVERY 24 HOURS
Status: DISCONTINUED | OUTPATIENT
Start: 2023-08-07 | End: 2023-08-07

## 2023-08-07 RX ORDER — ATORVASTATIN CALCIUM 40 MG/1
40 TABLET, FILM COATED ORAL DAILY
Status: DISCONTINUED | OUTPATIENT
Start: 2023-08-08 | End: 2023-08-10 | Stop reason: HOSPADM

## 2023-08-07 RX ORDER — FLUTICASONE PROPIONATE 50 MCG
1-2 SPRAY, SUSPENSION (ML) NASAL DAILY
Status: DISCONTINUED | OUTPATIENT
Start: 2023-08-08 | End: 2023-08-10 | Stop reason: HOSPADM

## 2023-08-07 RX ORDER — PROPOFOL 10 MG/ML
INJECTION, EMULSION INTRAVENOUS CONTINUOUS PRN
Status: DISCONTINUED | OUTPATIENT
Start: 2023-08-07 | End: 2023-08-07

## 2023-08-07 RX ORDER — CEFAZOLIN SODIUM 1 G/50ML
2000 SOLUTION INTRAVENOUS ONCE
Status: COMPLETED | OUTPATIENT
Start: 2023-08-07 | End: 2023-08-08

## 2023-08-07 RX ORDER — CEFEPIME HYDROCHLORIDE 1 G/1
1 INJECTION, POWDER, FOR SOLUTION INTRAMUSCULAR; INTRAVENOUS EVERY 8 HOURS
Status: DISCONTINUED | OUTPATIENT
Start: 2023-08-07 | End: 2023-08-08

## 2023-08-07 RX ORDER — TORSEMIDE 20 MG/1
20 TABLET ORAL DAILY
Status: DISCONTINUED | OUTPATIENT
Start: 2023-08-08 | End: 2023-08-10 | Stop reason: HOSPADM

## 2023-08-07 RX ORDER — GABAPENTIN 300 MG/1
300 CAPSULE ORAL 3 TIMES DAILY
Status: DISCONTINUED | OUTPATIENT
Start: 2023-08-07 | End: 2023-08-10 | Stop reason: HOSPADM

## 2023-08-07 RX ORDER — DEXTROSE MONOHYDRATE 25 G/50ML
25-50 INJECTION, SOLUTION INTRAVENOUS
Status: DISCONTINUED | OUTPATIENT
Start: 2023-08-07 | End: 2023-08-10 | Stop reason: HOSPADM

## 2023-08-07 RX ORDER — OXYCODONE HYDROCHLORIDE 5 MG/1
10 TABLET ORAL EVERY 4 HOURS PRN
Status: DISCONTINUED | OUTPATIENT
Start: 2023-08-07 | End: 2023-08-10 | Stop reason: HOSPADM

## 2023-08-07 RX ORDER — ONDANSETRON 2 MG/ML
INJECTION INTRAMUSCULAR; INTRAVENOUS PRN
Status: DISCONTINUED | OUTPATIENT
Start: 2023-08-07 | End: 2023-08-07

## 2023-08-07 RX ORDER — FENTANYL CITRATE 50 UG/ML
INJECTION, SOLUTION INTRAMUSCULAR; INTRAVENOUS PRN
Status: DISCONTINUED | OUTPATIENT
Start: 2023-08-07 | End: 2023-08-07

## 2023-08-07 RX ORDER — FENTANYL CITRATE 50 UG/ML
25-100 INJECTION, SOLUTION INTRAMUSCULAR; INTRAVENOUS
Status: DISCONTINUED | OUTPATIENT
Start: 2023-08-07 | End: 2023-08-10 | Stop reason: HOSPADM

## 2023-08-07 RX ORDER — FERROUS GLUCONATE 324(38)MG
324 TABLET ORAL DAILY
Status: DISCONTINUED | OUTPATIENT
Start: 2023-08-08 | End: 2023-08-10 | Stop reason: HOSPADM

## 2023-08-07 RX ORDER — NALOXONE HYDROCHLORIDE 0.4 MG/ML
0.4 INJECTION, SOLUTION INTRAMUSCULAR; INTRAVENOUS; SUBCUTANEOUS
Status: DISCONTINUED | OUTPATIENT
Start: 2023-08-07 | End: 2023-08-10 | Stop reason: HOSPADM

## 2023-08-07 RX ORDER — TIZANIDINE 2 MG/1
2-4 TABLET ORAL EVERY 6 HOURS PRN
COMMUNITY
End: 2023-12-26

## 2023-08-07 RX ORDER — OXYCODONE HYDROCHLORIDE 5 MG/1
5 TABLET ORAL EVERY 4 HOURS PRN
Status: DISCONTINUED | OUTPATIENT
Start: 2023-08-07 | End: 2023-08-10 | Stop reason: HOSPADM

## 2023-08-07 RX ORDER — ONDANSETRON 4 MG/1
4 TABLET, ORALLY DISINTEGRATING ORAL EVERY 30 MIN PRN
Status: DISCONTINUED | OUTPATIENT
Start: 2023-08-07 | End: 2023-08-07 | Stop reason: HOSPADM

## 2023-08-07 RX ORDER — POLYETHYLENE GLYCOL 3350 17 G/17G
17 POWDER, FOR SOLUTION ORAL DAILY
Status: DISCONTINUED | OUTPATIENT
Start: 2023-08-08 | End: 2023-08-10 | Stop reason: HOSPADM

## 2023-08-07 RX ORDER — TAMSULOSIN HYDROCHLORIDE 0.4 MG/1
0.4 CAPSULE ORAL DAILY
Status: DISCONTINUED | OUTPATIENT
Start: 2023-08-08 | End: 2023-08-10 | Stop reason: HOSPADM

## 2023-08-07 RX ORDER — ONDANSETRON 2 MG/ML
4 INJECTION INTRAMUSCULAR; INTRAVENOUS EVERY 6 HOURS PRN
Status: DISCONTINUED | OUTPATIENT
Start: 2023-08-07 | End: 2023-08-10 | Stop reason: HOSPADM

## 2023-08-07 RX ORDER — LISINOPRIL 5 MG/1
10 TABLET ORAL DAILY
Status: DISCONTINUED | OUTPATIENT
Start: 2023-08-08 | End: 2023-08-10 | Stop reason: HOSPADM

## 2023-08-07 RX ORDER — ACETAMINOPHEN 325 MG/1
975 TABLET ORAL EVERY 8 HOURS
Status: COMPLETED | OUTPATIENT
Start: 2023-08-07 | End: 2023-08-10

## 2023-08-07 RX ORDER — FINASTERIDE 5 MG/1
5 TABLET, FILM COATED ORAL DAILY
Status: DISCONTINUED | OUTPATIENT
Start: 2023-08-08 | End: 2023-08-10 | Stop reason: HOSPADM

## 2023-08-07 RX ORDER — AMOXICILLIN 250 MG
1 CAPSULE ORAL 2 TIMES DAILY
Status: DISCONTINUED | OUTPATIENT
Start: 2023-08-07 | End: 2023-08-10 | Stop reason: HOSPADM

## 2023-08-07 RX ORDER — LABETALOL 200 MG/1
200 TABLET, FILM COATED ORAL 2 TIMES DAILY
Status: DISCONTINUED | OUTPATIENT
Start: 2023-08-07 | End: 2023-08-10 | Stop reason: HOSPADM

## 2023-08-07 RX ORDER — TIZANIDINE 2 MG/1
2-4 TABLET ORAL EVERY 6 HOURS PRN
Status: DISCONTINUED | OUTPATIENT
Start: 2023-08-07 | End: 2023-08-10 | Stop reason: HOSPADM

## 2023-08-07 RX ORDER — LORATADINE 10 MG/1
10 TABLET ORAL DAILY
Status: DISCONTINUED | OUTPATIENT
Start: 2023-08-08 | End: 2023-08-10 | Stop reason: HOSPADM

## 2023-08-07 RX ORDER — BISACODYL 10 MG
10 SUPPOSITORY, RECTAL RECTAL DAILY PRN
Status: DISCONTINUED | OUTPATIENT
Start: 2023-08-07 | End: 2023-08-10 | Stop reason: HOSPADM

## 2023-08-07 RX ORDER — LIDOCAINE HYDROCHLORIDE 10 MG/ML
INJECTION, SOLUTION INFILTRATION; PERINEURAL PRN
Status: DISCONTINUED | OUTPATIENT
Start: 2023-08-07 | End: 2023-08-07

## 2023-08-07 RX ORDER — COLCHICINE 0.6 MG/1
0.6 TABLET ORAL DAILY
Status: DISCONTINUED | OUTPATIENT
Start: 2023-08-08 | End: 2023-08-10 | Stop reason: HOSPADM

## 2023-08-07 RX ADMIN — SODIUM CHLORIDE, POTASSIUM CHLORIDE, SODIUM LACTATE AND CALCIUM CHLORIDE 100 ML/HR: 600; 310; 30; 20 INJECTION, SOLUTION INTRAVENOUS at 15:29

## 2023-08-07 RX ADMIN — PHENYLEPHRINE HYDROCHLORIDE 50 MCG: 10 INJECTION INTRAVENOUS at 16:37

## 2023-08-07 RX ADMIN — SODIUM CHLORIDE, POTASSIUM CHLORIDE, SODIUM LACTATE AND CALCIUM CHLORIDE: 600; 310; 30; 20 INJECTION, SOLUTION INTRAVENOUS at 18:47

## 2023-08-07 RX ADMIN — ACETAMINOPHEN 975 MG: 325 TABLET ORAL at 18:18

## 2023-08-07 RX ADMIN — PROPOFOL 100 MCG/KG/MIN: 10 INJECTION, EMULSION INTRAVENOUS at 16:23

## 2023-08-07 RX ADMIN — PHENYLEPHRINE HYDROCHLORIDE 150 MCG: 10 INJECTION INTRAVENOUS at 16:51

## 2023-08-07 RX ADMIN — LIDOCAINE HYDROCHLORIDE 3 ML: 10 INJECTION, SOLUTION INFILTRATION; PERINEURAL at 16:23

## 2023-08-07 RX ADMIN — FENTANYL CITRATE 25 MCG: 50 INJECTION, SOLUTION INTRAMUSCULAR; INTRAVENOUS at 16:25

## 2023-08-07 RX ADMIN — PHENYLEPHRINE HYDROCHLORIDE 100 MCG: 10 INJECTION INTRAVENOUS at 16:48

## 2023-08-07 RX ADMIN — GABAPENTIN 300 MG: 300 CAPSULE ORAL at 22:29

## 2023-08-07 RX ADMIN — SENNOSIDES AND DOCUSATE SODIUM 1 TABLET: 50; 8.6 TABLET ORAL at 20:50

## 2023-08-07 RX ADMIN — PHENYLEPHRINE HYDROCHLORIDE 100 MCG: 10 INJECTION INTRAVENOUS at 16:39

## 2023-08-07 RX ADMIN — DEXTROSE MONOHYDRATE 900 MG: 50 INJECTION, SOLUTION INTRAVENOUS at 16:23

## 2023-08-07 RX ADMIN — CEFEPIME HYDROCHLORIDE 1 G: 1 INJECTION, POWDER, FOR SOLUTION INTRAMUSCULAR; INTRAVENOUS at 22:28

## 2023-08-07 RX ADMIN — ONDANSETRON 4 MG: 2 INJECTION INTRAMUSCULAR; INTRAVENOUS at 16:46

## 2023-08-07 RX ADMIN — LABETALOL HYDROCHLORIDE 200 MG: 200 TABLET ORAL at 22:28

## 2023-08-07 ASSESSMENT — ACTIVITIES OF DAILY LIVING (ADL)
ADLS_ACUITY_SCORE: 23
ADLS_ACUITY_SCORE: 22

## 2023-08-07 ASSESSMENT — LIFESTYLE VARIABLES: TOBACCO_USE: 1

## 2023-08-07 NOTE — OP NOTE
Date: 8/7/2023    Surgeon: HERIBERTO Salazar DPM    Preoperative diagnosis: Osteomyelitis right hallux    Postoperative diagnosis: Same    Procedure: Amputation right hallux    Anesthesia: Local with IV-sedation    Hemostasis: Pneumatic ankle tourniquet 250 mmHg    Pathology: Right hallux, Aerobic/anaerobic culture     Injectables: 0.5% Marcaine plain    Materials: 3-0 Vicryl, 3-0 Nylon    Complications: None    Blood loss: 1 cc      Findings: Patient presents for operative intervention for osteomyelitis of the right hallux.  I discussed today surgical procedure with the patient to include amputation of the right hallux with primary closure, hospital admission with transfer to TCU.  Risk include but limited to need for additional surgical intervention and partial foot amputation.  Consent has been obtained.  Conservative measures were attempted and exhausted. Patient questions invited and answered, including appropriate risk, benefits and complications. No guarantees given or implied. Patient has been NPO.    Description: Patient was brought to the operating room and placed on the table in supine position. IV-sedation was administered by the anesthesia department.  Injection of 0.5% Marcaine plain was administered to surgical site right foot. The foot was then prepped and draped in usual aseptic manner. The extremity was elevated and exsanguinated. Well-padded ankle pneumatic tourniquet was inflated to 250mmHg and the following procedure was then performed: Attention was directed to the hallux of the right foot where two semi-elliptical incisions were made at the base of the digit. The incisions were carried full thickness into the first MPJ where the digit was disarticulated. The digit was removed from the surgical site in toto and sent to pathology. Deep aerobic and anaerobic cultures taken. Next, a 1000cc pulse lavage was used to irrigate the surgical site. The subcutaneous tissue was reapproximated with 3-0 vicryl in a  simple fashion and the skin was closed with 3-0 nylon in a simple suture fashion.     Dressings consisted of adaptic, 4x4's, kerlix/dominic roll and an ace wrap. The pneumatic tourniquet was released and a hyperemic response was noted to the remaining digits on the right foot.     The patient appeared to tolerate all the procedures and anesthesia well without apparent complications. Patient was transported from the operating room to the recovery room with vital signs stable and neurovascular status as it was pre-operatively to the right foot. Patient to be admitted to [facility].  Consult placed to hospitalist service for medical management of diabetes.  ID consult placed.  Care management consult placed for assistance with transfer to TCU.  He should remain nonweightbearing on his right foot at all times.  Surgical dressing to remain intact.

## 2023-08-07 NOTE — ANESTHESIA CARE TRANSFER NOTE
Patient: Lance Ibrahim    Procedure: Procedure(s):  AMPUTATION, right hallux       Diagnosis: Osteomyelitis of ankle and foot (H) [M86.9]  Diagnosis Additional Information: No value filed.    Anesthesia Type:   MAC     Note:    Oropharynx: oropharynx clear of all foreign objects and spontaneously breathing  Level of Consciousness: awake  Oxygen Supplementation: nasal cannula  Level of Supplemental Oxygen (L/min / FiO2): 2  Independent Airway: airway patency satisfactory and stable  Dentition: dentition unchanged  Vital Signs Stable: post-procedure vital signs reviewed and stable  Report to RN Given: handoff report given  Patient transferred to: Medical/Surgical Unit  Comments: Report called to RN on P4 inpatient unit, questions answered. Patient is alert, talking, and denies pain.   Handoff Report: Identifed the Patient, Identified the Reponsible Provider, Reviewed the pertinent medical history, Discussed the surgical course, Reviewed Intra-OP anesthesia mangement and issues during anesthesia, Set expectations for post-procedure period and Allowed opportunity for questions and acknowledgement of understanding      Vitals:  Vitals Value Taken Time   /56 08/07/23 1710   Temp     Pulse 69 08/07/23 1710   Resp 16 08/07/23 1710   SpO2 99 % 08/07/23 1710       Electronically Signed By: RANJAN Hoffman CRNA  August 7, 2023  5:14 PM

## 2023-08-07 NOTE — PHARMACY-ADMISSION MEDICATION HISTORY
Pharmacist Admission Medication History    Admission medication history is complete. The information provided in this note is only as accurate as the sources available at the time of the update.    Medication reconciliation/reorder completed by provider prior to medication history? No    Information Source(s): Patient and CareEverywhere/SureScripts via in-person    Pertinent Information:      Changes made to PTA medication list:  Added: Tizanidine  Deleted: Doxycycline, levoflox, oxycodone  Changed: None    Medication Affordability:       Allergies reviewed with patient and updates made in EHR: yes    Medication History Completed By: Aldo Lockwood RPH 8/7/2023 3:40 PM    Prior to Admission medications    Medication Sig Last Dose Taking? Auth Provider Long Term End Date   amLODIPine (NORVASC) 5 MG tablet Take 1 tablet (5 mg) by mouth daily 8/7/2023 at am Yes Rickey Adamson MD Yes    aspirin (ASA) 81 MG EC tablet Take by mouth every 24 hours 8/7/2023 at am Yes Reported, Patient     atorvastatin (LIPITOR) 40 MG tablet Take 1 tablet (40 mg) by mouth every 24 hours 8/7/2023 at am Yes Lc Amaya MD Yes    colchicine (COLCYRS) 0.6 MG tablet TAKE 1 TABLET DAILY 8/7/2023 at am Yes Terence Hatch MD     Ferrous Gluconate 240 (27 Fe) MG TABS Take 1 tablet by mouth daily  8/7/2023 at am Yes Reported, Patient No    finasteride (PROSCAR) 5 MG tablet Take by mouth every 24 hours 8/7/2023 at am Yes Reported, Patient     fluticasone (FLONASE) 50 MCG/ACT nasal spray Spray 1-2 sprays in nostril every 24 hours 8/7/2023 at am Yes Reported, Patient     gabapentin (NEURONTIN) 300 MG capsule Take 1 capsule (300 mg) by mouth 3 times daily 8/7/2023 at am Yes Rickey Adamson MD Yes    isosorbide mononitrate (IMDUR) 30 MG 24 hr tablet Take 1 tablet (30 mg) by mouth daily 8/7/2023 at am Yes Rickey Adamson MD Yes    labetalol (NORMODYNE) 200 MG tablet TAKE 1 TABLET BY MOUTH TWICE A DAY 8/7/2023 at am Yes Anh Spivey APRN  CNP Yes    Lidocaine (LIDOCARE) 4 % Patch Place 1 patch onto the skin every 24 hours To prevent lidocaine toxicity, patient should be patch free for 12 hrs daily.  Patient taking differently: Place 1 patch onto the skin every 24 hours To prevent lidocaine toxicity, patient should be patch free for 12 hrs daily.  To back every am 8/7/2023 at am Yes Tiffanie Barakat MD     lisinopril (ZESTRIL) 10 MG tablet Take 1 tablet (10 mg) by mouth every 24 hours 8/7/2023 at am Yes Rickey Adamson MD Yes    loratadine (CLARITIN) 10 MG tablet Take by mouth every 24 hours 8/7/2023 at am Yes Reported, Patient     metFORMIN (GLUCOPHAGE) 1000 MG tablet TAKE 1 TABLET TWICE A DAY WITH MEALS 8/6/2023 at pm Yes Rickey Adamson MD Yes    multivitamin w/minerals (THERA-VIT-M) tablet Take 1 tablet by mouth daily 8/7/2023 at am Yes Reported, Patient     Probiotic Product (FORTIFY 30 BILLION PROBIOT 50+) CPDR Take 1 5 Pack by mouth daily 8/7/2023 at am Yes Rickey Adamson MD Yes    Semaglutide (RYBELSUS) 7 MG TABS Take 7 mg by mouth daily 8/7/2023 at am Yes Rickey Adamson MD     tamsulosin (FLOMAX) 0.4 MG 24 hr capsule Take 1 capsule by mouth daily. 8/7/2023 at am Yes Reported, Patient     tiZANidine (ZANAFLEX) 2 MG tablet Take 2-4 mg by mouth every 6 hours as needed for muscle spasms Not to exceed 3 doses/24 hours Past Week Yes Unknown, Entered By History No    torsemide (DEMADEX) 20 MG tablet Take 1 tablet (20 mg) by mouth daily 8/7/2023 at am Yes Rickey Adamson MD Yes    blood glucose monitoring (NO BRAND SPECIFIED) meter device kit Use to test blood sugar 2 times daily or as directed.   Anh Spivey APRN CNP     CONTOUR NEXT TEST test strip USE TO TEST BLOOD SUGAR 2 TIMES DAILY OR AS DIRECTED.   Rickey Adamson MD No    Microlet Lancets MISC USE TO TEST BLOOD SUGAR 2 TIMES DAILY OR AS DIRECTED.   Jeffery Villagran MD No

## 2023-08-07 NOTE — ANESTHESIA PREPROCEDURE EVALUATION
Anesthesia Pre-Procedure Evaluation    Patient: Lance Ibrahim   MRN: [id number] : 1945        Procedure : Procedure(s):  AMPUTATION, right hallux          Past Medical History:   Diagnosis Date     Anemia      Arthritis     osteoarthritis knees     BPH      CAD (coronary artery disease)     subtotal occlusion in the small distal LAD      Cardiomyopathy      Cardiomyopathy (H)      Cerebral artery occlusion with cerebral infarction     TIA , no residual     Cervicalgia      Chronic kidney disease      Chronic low back pain      Chronic rhinitis      Chronic rhinitis      Gouty arthropathy      HTN (hypertension)      Hyperlipidemia      Hypertension      Kidney stone      Nocturia      Obesity      PVD (peripheral vascular disease)      Sleep apnea     doesn't use cpap     TIA (transient ischaemic attack)      Type 2 diabetes mellitus - 2018     Ureteral stone       Past Surgical History:   Procedure Laterality Date     AMPUTATE TOE(S) Left 10/15/2018    Procedure: AMPUTATE TOE(S);  Left third toe amputation;  Surgeon: Ayaka Azevedo DPM, Podiatry/Foot and Ankle Surgery;  Location:  OR     ARTHROPLASTY KNEE Right 2018    Procedure: Right total knee arthroplasty;  Surgeon: Issa Cunha MD;  Location:  OR     COLONOSCOPY  2013    Procedure: COLONOSCOPY;  COLONOSCOPY;  Surgeon: Chau Hogan MD;  Location:  GI     CV HEART CATHETERIZATION WITH POSSIBLE INTERVENTION Left 2019    Procedure: Coronary Angiogram;  Surgeon: Nas Linda MD;  Location:  HEART CARDIAC CATH LAB     Diastasis recti repair  1985     EXTRACAPSULAR CATARACT EXTRATION WITH INTRAOCULAR LENS IMPLANT Left 2017     EXTRACAPSULAR CATARACT EXTRATION WITH INTRAOCULAR LENS IMPLANT Right      FOOT SURGERY  2013    cyst removal      HERNIA REPAIR  2004    ventral      ORIF Shoulder Left      Ventral hernia repair NOS  1987      Allergies   Allergen Reactions     Penicillins  "Anaphylaxis     22 - tolerated cefepime      Sulfa Antibiotics      \"itchy rash, swelling of face and hands\"     Ancef [Cefazolin] Rash      Social History     Tobacco Use     Smoking status: Former     Packs/day: 0.00     Types: Cigarettes     Quit date: 3/17/1993     Years since quittin.4     Smokeless tobacco: Never   Substance Use Topics     Alcohol use: Not Currently      Wt Readings from Last 1 Encounters:   23 99.1 kg (218 lb 8 oz)        Anesthesia Evaluation   Pt has had prior anesthetic.     History of anesthetic complications       ROS/MED HX  ENT/Pulmonary:     (+) sleep apnea,               tobacco use, Past use,                      Neurologic:     (+)          CVA,    TIA,                  Cardiovascular:     (+)  hypertension- -  CAD -  - -           ISBELL.                        (-) murmur   METS/Exercise Tolerance:     Hematologic:  - neg hematologic  ROS     Musculoskeletal:  - neg musculoskeletal ROS     GI/Hepatic:  - neg GI/hepatic ROS     Renal/Genitourinary:     (+) renal disease, type: CRI,            Endo:     (+)  type II DM,             Obesity (BMI 32.33),       Psychiatric/Substance Use:  - neg psychiatric ROS     Infectious Disease:       Malignancy:  - neg malignancy ROS     Other:      (+)  , H/O Chronic Pain,         Physical Exam    Airway        Mallampati: II   TM distance: > 3 FB   Neck ROM: full   Mouth opening: > 3 cm    Respiratory Devices and Support         Dental       (+) Modest Abnormalities - crowns, retainers, 1 or 2 missing teeth      Cardiovascular          Rhythm and rate: regular and normal (-) no murmur    Pulmonary           (-) no rhonchi    OUTSIDE LABS:  CBC:   Lab Results   Component Value Date    WBC 9.6 2023    WBC 7.1 2023    HGB 10.2 (L) 2023    HGB 12.5 (L) 2023    HCT 38.9 (L) 2023    HCT 39.5 (L) 2023     2023     2023     BMP:   Lab Results   Component Value Date     " 04/27/2023     08/27/2022    POTASSIUM 4.3 04/27/2023    POTASSIUM 5.0 04/19/2023    CHLORIDE 99 04/27/2023    CHLORIDE 108 (H) 08/27/2022    CO2 22 04/27/2023    CO2 22 08/27/2022    BUN 34.0 (H) 04/27/2023    BUN 16 08/27/2022    CR 2.25 (H) 04/27/2023    CR 1.24 08/29/2022     (H) 04/27/2023     (H) 08/29/2022     COAGS:   Lab Results   Component Value Date    PTT 35 03/04/2019    INR 1.17 (H) 12/25/2021     POC:   Lab Results   Component Value Date    BGM 93 07/28/2020     HEPATIC:   Lab Results   Component Value Date    ALBUMIN 4.3 04/27/2023    PROTTOTAL 7.5 04/27/2023    ALT 26 04/27/2023    AST 17 04/27/2023    ALKPHOS 100 04/27/2023    BILITOTAL 0.3 04/27/2023     OTHER:   Lab Results   Component Value Date    PH 7.48 (H) 10/17/2018    LACT 1.3 12/25/2021    A1C 7.3 (H) 07/19/2023    SHANNAN 9.6 04/27/2023    MAG 1.9 01/24/2020    LIPASE 367 10/22/2016    TSH 3.24 01/24/2020    CRP 4.7 (H) 08/25/2022    SED 39 (H) 07/28/2020       Anesthesia Plan    ASA Status:  3    NPO Status:  NPO Appropriate    Anesthesia Type: MAC.     - Reason for MAC: chronic cardiopulmonary disease, immobility needed, straight local not clinically adequate              Consents    Anesthesia Plan(s) and associated risks, benefits, and realistic alternatives discussed. Questions answered and patient/representative(s) expressed understanding.     - Discussed: Risks, Benefits and Alternatives for BOTH SEDATION and the PROCEDURE were discussed     - Discussed with:  Patient            Postoperative Care    Pain management: IV analgesics, Oral pain medications.   PONV prophylaxis: Ondansetron (or other 5HT-3)     Comments:              Nas Matute MD

## 2023-08-07 NOTE — ANESTHESIA POSTPROCEDURE EVALUATION
Patient: Lance Ibrahim    Procedure: Procedure(s):  AMPUTATION, right hallux       Anesthesia Type:  MAC    Note:     Postop Pain Control: Uneventful            Sign Out: Well controlled pain   PONV: No   Neuro/Psych: Uneventful            Sign Out: Acceptable/Baseline neuro status   Airway/Respiratory: Uneventful            Sign Out: Acceptable/Baseline resp. status   CV/Hemodynamics: Uneventful            Sign Out: Acceptable CV status; No obvious hypovolemia; No obvious fluid overload   Other NRE: NONE   DID A NON-ROUTINE EVENT OCCUR?            Last vitals:  Vitals:    08/07/23 1710 08/07/23 1730 08/07/23 1800   BP: 101/56 103/55 123/62   Pulse: 69 70 70   Resp: 16 18 20   Temp:  36.4  C (97.6  F) 36.4  C (97.5  F)   SpO2: 99% 97% 99%       Electronically Signed By: Madisyn Boyd MD  August 7, 2023  6:47 PM

## 2023-08-08 ENCOUNTER — APPOINTMENT (OUTPATIENT)
Dept: PHYSICAL THERAPY | Facility: HOSPITAL | Age: 78
End: 2023-08-08
Attending: PODIATRIST
Payer: COMMERCIAL

## 2023-08-08 PROBLEM — I50.22 CHRONIC SYSTOLIC CONGESTIVE HEART FAILURE (H): Status: ACTIVE | Noted: 2023-08-08

## 2023-08-08 LAB
ANION GAP SERPL CALCULATED.3IONS-SCNC: 9 MMOL/L (ref 7–15)
BASOPHILS # BLD AUTO: 0.1 10E3/UL (ref 0–0.2)
BASOPHILS NFR BLD AUTO: 1 %
BUN SERPL-MCNC: 40.1 MG/DL (ref 8–23)
CALCIUM SERPL-MCNC: 9.5 MG/DL (ref 8.8–10.2)
CHLORIDE SERPL-SCNC: 106 MMOL/L (ref 98–107)
CREAT SERPL-MCNC: 1.87 MG/DL (ref 0.67–1.17)
DEPRECATED HCO3 PLAS-SCNC: 25 MMOL/L (ref 22–29)
EOSINOPHIL # BLD AUTO: 0.4 10E3/UL (ref 0–0.7)
EOSINOPHIL NFR BLD AUTO: 5 %
ERYTHROCYTE [DISTWIDTH] IN BLOOD BY AUTOMATED COUNT: 16.4 % (ref 10–15)
GFR SERPL CREATININE-BSD FRML MDRD: 36 ML/MIN/1.73M2
GLUCOSE BLDC GLUCOMTR-MCNC: 119 MG/DL (ref 70–99)
GLUCOSE BLDC GLUCOMTR-MCNC: 123 MG/DL (ref 70–99)
GLUCOSE BLDC GLUCOMTR-MCNC: 137 MG/DL (ref 70–99)
GLUCOSE BLDC GLUCOMTR-MCNC: 137 MG/DL (ref 70–99)
GLUCOSE BLDC GLUCOMTR-MCNC: 163 MG/DL (ref 70–99)
GLUCOSE SERPL-MCNC: 121 MG/DL (ref 70–99)
HCT VFR BLD AUTO: 32.9 % (ref 40–53)
HGB BLD-MCNC: 9.9 G/DL (ref 13.3–17.7)
IMM GRANULOCYTES # BLD: 0 10E3/UL
IMM GRANULOCYTES NFR BLD: 1 %
LYMPHOCYTES # BLD AUTO: 1.7 10E3/UL (ref 0.8–5.3)
LYMPHOCYTES NFR BLD AUTO: 26 %
MCH RBC QN AUTO: 27 PG (ref 26.5–33)
MCHC RBC AUTO-ENTMCNC: 30.1 G/DL (ref 31.5–36.5)
MCV RBC AUTO: 90 FL (ref 78–100)
MONOCYTES # BLD AUTO: 0.6 10E3/UL (ref 0–1.3)
MONOCYTES NFR BLD AUTO: 8 %
NEUTROPHILS # BLD AUTO: 3.9 10E3/UL (ref 1.6–8.3)
NEUTROPHILS NFR BLD AUTO: 59 %
NRBC # BLD AUTO: 0 10E3/UL
NRBC BLD AUTO-RTO: 0 /100
PLATELET # BLD AUTO: 244 10E3/UL (ref 150–450)
POTASSIUM SERPL-SCNC: 4.7 MMOL/L (ref 3.4–5.3)
RBC # BLD AUTO: 3.66 10E6/UL (ref 4.4–5.9)
SODIUM SERPL-SCNC: 140 MMOL/L (ref 136–145)
WBC # BLD AUTO: 6.6 10E3/UL (ref 4–11)

## 2023-08-08 PROCEDURE — 36415 COLL VENOUS BLD VENIPUNCTURE: CPT | Performed by: STUDENT IN AN ORGANIZED HEALTH CARE EDUCATION/TRAINING PROGRAM

## 2023-08-08 PROCEDURE — G0378 HOSPITAL OBSERVATION PER HR: HCPCS

## 2023-08-08 PROCEDURE — 258N000003 HC RX IP 258 OP 636: Performed by: STUDENT IN AN ORGANIZED HEALTH CARE EDUCATION/TRAINING PROGRAM

## 2023-08-08 PROCEDURE — 96361 HYDRATE IV INFUSION ADD-ON: CPT

## 2023-08-08 PROCEDURE — 99233 SBSQ HOSP IP/OBS HIGH 50: CPT | Performed by: PODIATRIST

## 2023-08-08 PROCEDURE — 99214 OFFICE O/P EST MOD 30 MIN: CPT | Performed by: INTERNAL MEDICINE

## 2023-08-08 PROCEDURE — 250N000011 HC RX IP 250 OP 636: Mod: JZ | Performed by: INTERNAL MEDICINE

## 2023-08-08 PROCEDURE — 82962 GLUCOSE BLOOD TEST: CPT

## 2023-08-08 PROCEDURE — 85025 COMPLETE CBC W/AUTO DIFF WBC: CPT | Performed by: STUDENT IN AN ORGANIZED HEALTH CARE EDUCATION/TRAINING PROGRAM

## 2023-08-08 PROCEDURE — 96372 THER/PROPH/DIAG INJ SC/IM: CPT | Performed by: INTERNAL MEDICINE

## 2023-08-08 PROCEDURE — 97162 PT EVAL MOD COMPLEX 30 MIN: CPT | Mod: GP

## 2023-08-08 PROCEDURE — 80048 BASIC METABOLIC PNL TOTAL CA: CPT | Performed by: STUDENT IN AN ORGANIZED HEALTH CARE EDUCATION/TRAINING PROGRAM

## 2023-08-08 PROCEDURE — 99232 SBSQ HOSP IP/OBS MODERATE 35: CPT | Performed by: INTERNAL MEDICINE

## 2023-08-08 PROCEDURE — 97530 THERAPEUTIC ACTIVITIES: CPT | Mod: GP

## 2023-08-08 PROCEDURE — 250N000013 HC RX MED GY IP 250 OP 250 PS 637: Performed by: PODIATRIST

## 2023-08-08 PROCEDURE — 250N000013 HC RX MED GY IP 250 OP 250 PS 637: Performed by: STUDENT IN AN ORGANIZED HEALTH CARE EDUCATION/TRAINING PROGRAM

## 2023-08-08 PROCEDURE — 250N000011 HC RX IP 250 OP 636: Mod: JZ | Performed by: STUDENT IN AN ORGANIZED HEALTH CARE EDUCATION/TRAINING PROGRAM

## 2023-08-08 RX ORDER — CEFEPIME HYDROCHLORIDE 1 G/1
1 INJECTION, POWDER, FOR SOLUTION INTRAMUSCULAR; INTRAVENOUS EVERY 24 HOURS
Status: DISCONTINUED | OUTPATIENT
Start: 2023-08-09 | End: 2023-08-10 | Stop reason: HOSPADM

## 2023-08-08 RX ORDER — HEPARIN SODIUM 5000 [USP'U]/.5ML
5000 INJECTION, SOLUTION INTRAVENOUS; SUBCUTANEOUS EVERY 12 HOURS
Status: DISCONTINUED | OUTPATIENT
Start: 2023-08-08 | End: 2023-08-10 | Stop reason: HOSPADM

## 2023-08-08 RX ORDER — ASPIRIN 81 MG/1
81 TABLET ORAL EVERY 24 HOURS
Status: DISCONTINUED | OUTPATIENT
Start: 2023-08-09 | End: 2023-08-10 | Stop reason: HOSPADM

## 2023-08-08 RX ORDER — VANCOMYCIN HYDROCHLORIDE 1 G/200ML
1000 INJECTION, SOLUTION INTRAVENOUS EVERY 24 HOURS
Status: DISCONTINUED | OUTPATIENT
Start: 2023-08-08 | End: 2023-08-10 | Stop reason: HOSPADM

## 2023-08-08 RX ADMIN — LORATADINE 10 MG: 10 TABLET ORAL at 11:05

## 2023-08-08 RX ADMIN — POLYETHYLENE GLYCOL 3350 17 G: 17 POWDER, FOR SOLUTION ORAL at 11:37

## 2023-08-08 RX ADMIN — HEPARIN SODIUM 5000 UNITS: 10000 INJECTION, SOLUTION INTRAVENOUS; SUBCUTANEOUS at 23:32

## 2023-08-08 RX ADMIN — COLCHICINE 0.6 MG: 0.6 TABLET ORAL at 11:36

## 2023-08-08 RX ADMIN — ATORVASTATIN CALCIUM 40 MG: 40 TABLET, FILM COATED ORAL at 11:05

## 2023-08-08 RX ADMIN — VANCOMYCIN HYDROCHLORIDE 1000 MG: 1 INJECTION, SOLUTION INTRAVENOUS at 13:57

## 2023-08-08 RX ADMIN — LABETALOL HYDROCHLORIDE 200 MG: 200 TABLET ORAL at 22:02

## 2023-08-08 RX ADMIN — GABAPENTIN 300 MG: 300 CAPSULE ORAL at 11:05

## 2023-08-08 RX ADMIN — Medication 1 TABLET: at 11:05

## 2023-08-08 RX ADMIN — LISINOPRIL 10 MG: 5 TABLET ORAL at 11:05

## 2023-08-08 RX ADMIN — SENNOSIDES AND DOCUSATE SODIUM 1 TABLET: 50; 8.6 TABLET ORAL at 11:05

## 2023-08-08 RX ADMIN — FLUTICASONE PROPIONATE 2 SPRAY: 50 SPRAY, METERED NASAL at 11:36

## 2023-08-08 RX ADMIN — FERROUS GLUCONATE 324 MG: 324 TABLET ORAL at 11:36

## 2023-08-08 RX ADMIN — GABAPENTIN 300 MG: 300 CAPSULE ORAL at 13:57

## 2023-08-08 RX ADMIN — ACETAMINOPHEN 975 MG: 325 TABLET ORAL at 11:05

## 2023-08-08 RX ADMIN — TAMSULOSIN HYDROCHLORIDE 0.4 MG: 0.4 CAPSULE ORAL at 11:05

## 2023-08-08 RX ADMIN — VANCOMYCIN HYDROCHLORIDE 2000 MG: 5 INJECTION, POWDER, LYOPHILIZED, FOR SOLUTION INTRAVENOUS at 00:26

## 2023-08-08 RX ADMIN — FINASTERIDE 5 MG: 5 TABLET, FILM COATED ORAL at 11:05

## 2023-08-08 RX ADMIN — GABAPENTIN 300 MG: 300 CAPSULE ORAL at 22:02

## 2023-08-08 RX ADMIN — AMLODIPINE BESYLATE 5 MG: 5 TABLET ORAL at 11:05

## 2023-08-08 RX ADMIN — OXYCODONE HYDROCHLORIDE 5 MG: 5 TABLET ORAL at 23:40

## 2023-08-08 RX ADMIN — CEFEPIME HYDROCHLORIDE 1 G: 1 INJECTION, POWDER, FOR SOLUTION INTRAMUSCULAR; INTRAVENOUS at 05:50

## 2023-08-08 RX ADMIN — ISOSORBIDE MONONITRATE 30 MG: 30 TABLET, EXTENDED RELEASE ORAL at 11:05

## 2023-08-08 RX ADMIN — TORSEMIDE 20 MG: 20 TABLET ORAL at 11:36

## 2023-08-08 RX ADMIN — LABETALOL HYDROCHLORIDE 200 MG: 200 TABLET ORAL at 11:36

## 2023-08-08 RX ADMIN — ACETAMINOPHEN 975 MG: 325 TABLET ORAL at 17:29

## 2023-08-08 RX ADMIN — OXYCODONE HYDROCHLORIDE 5 MG: 5 TABLET ORAL at 07:43

## 2023-08-08 RX ADMIN — ACETAMINOPHEN 975 MG: 325 TABLET ORAL at 02:09

## 2023-08-08 ASSESSMENT — ACTIVITIES OF DAILY LIVING (ADL)
ADLS_ACUITY_SCORE: 23
ADLS_ACUITY_SCORE: 26
ADLS_ACUITY_SCORE: 26
ADLS_ACUITY_SCORE: 25
ADLS_ACUITY_SCORE: 26
DEPENDENT_IADLS:: INDEPENDENT
ADLS_ACUITY_SCORE: 26
ADLS_ACUITY_SCORE: 23
ADLS_ACUITY_SCORE: 25
ADLS_ACUITY_SCORE: 25
ADLS_ACUITY_SCORE: 26
ADLS_ACUITY_SCORE: 25
ADLS_ACUITY_SCORE: 26

## 2023-08-08 NOTE — CARE PLAN
PRIMARY DIAGNOSIS: ACUTE PAIN  OUTPATIENT/OBSERVATION GOALS TO BE MET BEFORE DISCHARGE:  1. Pain Status: Improved-controlled with oral pain medications.    2. Return to near baseline physical activity: No    3. Cleared for discharge by consultants (if involved): No    Discharge Planner Nurse   Safe discharge environment identified: No  Barriers to discharge: yes       Entered by: Macy Santana RN 08/08/2023 4:35 AM     Please review provider order for any additional goals.   Nurse to notify provider when observation goals have been met and patient is ready for discharge. Pt denies pain to RLE, IVF running at 100ml/hr, O2 96% on 2L NC, no SOB noted. Pt tolerating IV abx well, dresssing to RLE clean, dry and intact, extremities elevated on pillows and NWB. Pt is voiding without difficulty, using the urinal at bed side.

## 2023-08-08 NOTE — PHARMACY-VANCOMYCIN DOSING SERVICE
Pharmacy Vancomycin Initial Note  Date of Service 2023  Patient's  1945  78 year old, male    Indication: Osteomyelitis    Current estimated CrCl = Estimated Creatinine Clearance: 39.1 mL/min (A) (based on SCr of 1.87 mg/dL (H)).    Creatinine for last 3 days  2023:  5:54 AM Creatinine 1.87 mg/dL    Recent Vancomycin Level(s) for last 3 days  No results found for requested labs within last 3 days.      Vancomycin IV Administrations (past 72 hours)                     vancomycin (VANCOCIN) 2,000 mg in sodium chloride 0.9 % 500 mL intermittent infusion (mg) 2,000 mg New Bag 23 0026                    Nephrotoxins and other renal medications (From now, onward)      Start     Dose/Rate Route Frequency Ordered Stop    23 09  torsemide (DEMADEX) tablet 20 mg        Note to Pharmacy: PTA Sig:Take 1 tablet (20 mg) by mouth daily      20 mg Oral DAILY 23  lisinopril (ZESTRIL) tablet 10 mg        Note to Pharmacy: PTA Sig:Take 1 tablet (10 mg) by mouth every 24 hours      10 mg Oral DAILY 23              Contrast Orders - past 72 hours (72h ago, onward)      None            [employer] Prediction of Planned Initial Vancomycin Regimen  Loading dose: 2000 mg on 23 at 00:26  Regimen: 1000 mg IV every 24 hours.  Start time: 12:00 on 2023  Exposure target: AUC24 (range)400-600 mg/L.hr   AUC24,ss: 484 mg/L.hr  Probability of AUC24 > 400: 70 %  Ctrough,ss: 16 mg/L  Probability of Ctrough,ss > 20: 30 %  Probability of nephrotoxicity (Lodise LATISHA ): 11 %          Plan:  Start vancomycin 2000 mg loading dose (given). Follow with 1000 mg IV q24h - this regimen is timed to start at noon, ~12 hours after the loading dose was given. This timing will minimize sub-therapeutic intervals and achieve steady state faster.  Vancomycin monitoring method: AUC  Vancomycin therapeutic monitoring goal: 400-600 mg*h/L  Pharmacy will check vancomycin levels as  appropriate in 1-3 Days.    Serum creatinine levels will be ordered daily for the first week of therapy and at least twice weekly for subsequent weeks.      Rosalina Long RP

## 2023-08-08 NOTE — CARE PLAN
PRIMARY DIAGNOSIS: ACUTE PAIN  OUTPATIENT/OBSERVATION GOALS TO BE MET BEFORE DISCHARGE:  1. Pain Status: Improved-controlled with oral pain medications.    2. Return to near baseline physical activity: No    3. Cleared for discharge by consultants (if involved):     Discharge Planner Nurse   Safe discharge environment identified: No  Barriers to discharge: Yes alert is A/O X4, mild pain to right  Lower extremities, got oxycodone and proven effective. Dressing to right lower clean, dry and intact. Pt is tolerating clear liquid diet well, used the urinal at bed side and had 500ml yellow clear urine output. IVF and and IV abx tolerated.  Lower extremities elevated on pillows and  CMS intact.         Entered by: Macy Santana RN 08/07/2023 10:53 PM     Please review provider order for any additional goals.   Nurse to notify provider when observation goals have been met and patient is ready for discharge.

## 2023-08-08 NOTE — CONSULTS
Care Management Initial Consult    General Information  Assessment completed with: Patient,    Type of CM/SW Visit: Initial Assessment    Primary Care Provider verified and updated as needed:     Readmission within the last 30 days:        Reason for Consult: discharge planning  Advance Care Planning:            Communication Assessment  Patient's communication style: spoken language (English or Bilingual)    Hearing Difficulty or Deaf: no   Wear Glasses or Blind: yes    Cognitive  Cognitive/Neuro/Behavioral: WDL                      Living Environment:   People in home: alone     Current living Arrangements: apartment      Able to return to prior arrangements: yes       Family/Social Support:  Care provided by: self  Provides care for: no one     Children          Description of Support System: Supportive, Involved    Support Assessment: Adequate family and caregiver support, Adequate social supports    Current Resources:   Patient receiving home care services: No     Community Resources: None  Equipment currently used at home:  (4WW)  Supplies currently used at home: None    Employment/Financial:  Employment Status: retired        Financial Concerns: No concerns identified   Referral to Financial Worker: No       Does the patient's insurance plan have a 3 day qualifying hospital stay waiver?  Yes   Will the waiver be used for post-acute placement? Yes    Lifestyle & Psychosocial Needs:  Social Determinants of Health     Tobacco Use: Medium Risk (8/7/2023)    Patient History     Smoking Tobacco Use: Former     Smokeless Tobacco Use: Never     Passive Exposure: Not on file   Alcohol Use: Not on file   Financial Resource Strain: Not on file   Food Insecurity: Not on file   Transportation Needs: Not on file   Physical Activity: Not on file   Stress: Not on file   Social Connections: Not on file   Intimate Partner Violence: Not on file   Depression: Not at risk (4/27/2023)    PHQ-2     PHQ-2 Score: 0   Housing  Stability: Not on file       Functional Status:  Prior to admission patient needed assistance:   Dependent ADLs:: Independent  Dependent IADLs:: Independent  Assesssment of Functional Status: Not at baseline with mobility, Not at baseline with ADL Functioning, Not at  functional baseline, Needs placement in a SNF/[facility] for rehabilitation             Additional Information:  SW met with pt for initial assessment. Pt expresses concerns about his mobility currently as he needs a lot of assistance to remain non weight bearing. Pt confirms that he lives alone in an apartment. He reports that his kids and in-laws also live in [place] near him and are supportive. Pt reports that he is completely independent with ADLs and IADLs at baseline.    Discussed PT recommendation for TCU. Pt agreeable. SW provided list of options to review. Pt called his daughter in law Tsering Ireland to get options as her mother was a nurse and had facilities that she recommended. Tsering and her mother, Melvina, recommend referrals to [facility], [facility], [facility]. Pt agreeable to referrals to these facilities. Discussed that an authorization would need to be obtained from his insurance.. Pt reports understanding. Pt agreeable to possible private room fee at [facility] if needed. Discussed transport. Pt requests ride be arranged and is agreeable to private pay costs for transport.     SW sent initial referrals and submitted [employer]. SW will follow.    DOUGLAS Sargent

## 2023-08-08 NOTE — CONSULTS
Consultation - Infectious Disease  [facility]  Lance Ibrahim,  1945, MRN [id number]    Admitting Dx: Osteomyelitis of ankle and foot (H) [M86.9]    PCP: Rickey Adamson, [phone]       ASSESSMENT   78-year-old man with history of coronary disease, hypertension, lipidemia, diabetes who is admitted for right hallux osteomyelitis.    Chronic, nonhealing ulcer on the right hallux.  Followed in podiatry.  Recently on doxycycline, then levofloxacin prior to admission.  Wound culture prior to admission with Citrobacter, Pseudomonas, and Enterococcus.  Right hallux osteomyelitis.  Recent MRI showing osteomyelitis at the distal and proximal phalanx.  Status post amputation in toto on .  Cultures collected and pending.  Pathology pending    Principal Problem:    Osteomyelitis of ankle and foot (H)       PLAN   -Discontinue clindamycin  -Continue cefepime and vancomycin  -Follow-up on intraoperative cultures  -Narrow antibiotics as able  -Recommend lymphedema wraps/compression stockings while inpatient due to chronic history of lower extremity edema      Thank you for this consult. Will follow.    Koko Wolfe MD  [facility]  Direct messaging: [employer]aging  On-Call ID provider: [phone], option: 9      ===========================================      Chief Complaint   Osteomyelitis of ankle and foot (H)       HPI     We have been requested by Nakul Salazar D* to evaluate Lance Ibrahim for the above.    History obtained by patient    Lance Ibrahim is a 78 year old man with history of coronary artery disease, hypertension, hyperlipidemia, diabetes, admitted with right first toe osteomyelitis.  Patient been seen by me in the past for nonhealing ulcer.  Over the summer the seem to have gotten worse, with wound probing down to bone.  MRI as an outpatient was done confirming osteomyelitis.  He is followed by Dr. Salazar in clinic.  He had cultures collected last  month that showed polymicrobial growth.  He was on levofloxacin prior to admission.  He was admitted for first toe amputation.  Plan is for discharge to TCU.    Patient is chronic left knee pain.  He was scheduled to have left knee replacement on 8/23.          Review of Systems   Ten systems reviewed and negative except for what is noted in the HPI     Medical History  Past Medical History:   Diagnosis Date    Anemia     Arthritis     osteoarthritis knees    BPH     CAD (coronary artery disease)     subtotal occlusion in the small distal LAD     Cardiomyopathy     Cardiomyopathy (H)     Cerebral artery occlusion with cerebral infarction     TIA 1993, no residual    Cervicalgia     Chronic kidney disease     Chronic low back pain     Chronic rhinitis     Chronic rhinitis     Gouty arthropathy     HTN (hypertension)     Hyperlipidemia     Hypertension     Kidney stone     Nocturia     Obesity     PVD (peripheral vascular disease)     Sleep apnea     doesn't use cpap    TIA (transient ischaemic attack) 1993    Type 2 diabetes mellitus - 11/08/2018    Ureteral stone     Surgical History  He  has a past surgical history that includes Diastasis recti repair (1985); Ventral hernia repair NOS (1987); ORIF Shoulder (Left); Colonoscopy (03/09/2013); hernia repair (07/13/2004); Foot surgery (04/2013); Amputate toe(s) (Left, 10/15/2018); Arthroplasty knee (Right, 12/07/2018); Heart Catheterization with Possible Intervention (Left, 03/05/2019); Extracapsular cataract extration with intraocular lens implant (Left, 03/13/2017); Extracapsular cataract extration with intraocular lens implant (Right); and Amputate foot (Right, 8/7/2023).     Social History  Reviewed, and he  reports that he quit smoking about 30 years ago. His smoking use included cigarettes. He has never used smokeless tobacco. He reports that he does not currently use alcohol. He reports that he does not use drugs.  Social History     Social History Narrative     "Not on file     Family History  family history includes Alcohol/Drug in his paternal grandfather; Cancer in his father; Diabetes in his maternal grandfather; Family History Negative in his mother.  family history reviewed and is not pertinent to the presenting problem.            Allergies     Allergies   Allergen Reactions    Penicillins Anaphylaxis     8/25/22 - tolerated cefepime     Sulfa Antibiotics      \"itchy rash, swelling of face and hands\"    Ancef [Cefazolin] Rash         Antibiotics   Cefepime 8/7-  Clindamycin 8/7-  Vancomycin/8-    Previous:  Levofloxacin prior to admission      Physical Exam     Temp:  [97.5  F (36.4  C)-97.8  F (36.6  C)] 97.8  F (36.6  C)  Pulse:  [69-80] 76  Resp:  [12-20] 20  BP: (101-150)/(55-71) 142/65  SpO2:  [95 %-100 %] 100 %    BP (!) 142/65   Pulse 76   Temp 97.8  F (36.6  C) (Oral)   Resp 20   Wt 99.1 kg (218 lb 8 oz)   SpO2 100%   BMI 29.63 kg/m      GENERAL:  well-developed, well-nourished, sitting in chair in no acute distress.   HENT:  Head is normocephalic, atraumatic.   EYES:  Eyes have anicteric sclerae without conjunctival injection   NECK:  Supple.  LUNGS:  Clear to auscultation.  CARDIOVASCULAR:  Regular rate and rhythm with no murmurs, gallops or rubs.  ABDOMEN:  Normal bowel sounds, soft, nontender. No appreciable hepatosplenomegaly  EXT: Lower extremity edema  SKIN:  No acute rashes.  Right foot wrapped. No stigmata of endocarditis.  NEUROLOGIC:  Grossly nonfocal.      Cultures   Previous cultures reviewed and summarized above  8/7 right foot: No organisms on Gram stain; culture pending      Laboratory results     Recent Labs   Lab 08/08/23  0554   WBC 6.6   HGB 9.9*          Recent Labs   Lab 08/08/23  0554      CO2 25   BUN 40.1*       No results for input(s): CRP, SED in the last 168 hours.        Imaging   Radiology results reviewed    POC US Guidance Needle Placement    Result Date: 8/7/2023  Ultrasound was performed as guidance to an " "anesthesia procedure.  Click \"PACS images\" hyperlink below to view any stored images.  For specific procedure details, view procedure note authored by anesthesia.      Data reviewed today: I reviewed all medications, new labs and imaging results over the last 24 hours. I personally reviewed the right foot MR image(s) showing osteomyelitis .  The patient's care was discussed with the Bedside Nurse and Patient.    "

## 2023-08-08 NOTE — PROGRESS NOTES
Physical Therapy        08/08/23 0923   Appointment Info   Signing Clinician's Name / Credentials (PT) Brittany Quiroz DPT   Quick Adds   Quick Adds Certification   Living Environment   People in Home alone   Current Living Arrangements apartment   Home Accessibility no concerns   Transportation Anticipated agency   Self-Care   Equipment Currently Used at Home   (4WW)   Fall history within last six months no   Activity/Exercise/Self-Care Comment independent with ADLs/IADLs, has meals delivered, drives   General Information   Onset of Illness/Injury or Date of Surgery 08/07/23   Referring Physician Nakul Salazar DPM   Patient/Family Therapy Goals Statement (PT) try knee walker   Pertinent History of Current Problem (include personal factors and/or comorbidities that impact the POC) R hallux amputation s/p osteomyelitis   Existing Precautions/Restrictions fall;weight bearing   Weight-Bearing Status - RLE nonweight-bearing   Cognition   Affect/Mental Status (Cognition) WFL   Pain Assessment   Patient Currently in Pain   (back pain. denies foot pain)   Integumentary/Edema   Integumentary/Edema no deficits were identifed   Range of Motion (ROM)   Range of Motion ROM deficits secondary to surgical procedure   Strength (Manual Muscle Testing)   Strength Comments WFL except RLE; L knee limited by arthritis   Bed Mobility   Comment, (Bed Mobility) SBA supine to sit with rail   Transfers   Comment, (Transfers) Transfer potential is negatively impacted by left knee OA; pt was scheduled for knee replacement later this month. pt unable to perform sit to stand without assistance, attempts to put weight through right heel   Gait/Stairs (Locomotion)   Comment, (Gait/Stairs) unable to maintain NWB in order to ambulate   Balance   Balance Comments impaired 2/2 neuropathy and NWB status   Sensory Examination   Sensory Perception Comments bottoms of both feet numb, as at baseline   Coordination   Coordination no deficits were  identified   Muscle Tone   Muscle Tone no deficits were identified   Clinical Impression   Criteria for Skilled Therapeutic Intervention Yes, treatment indicated   PT Diagnosis (PT) gait and transfer deficits   Influenced by the following impairments weightbearing restrictions, balance impairment   Functional limitations due to impairments fall risk, inability to navigate home   Clinical Presentation (PT Evaluation Complexity) Stable/Uncomplicated   Clinical Presentation Rationale presents as medically diagnosed   Clinical Decision Making (Complexity) low complexity   Planned Therapy Interventions (PT) balance training;bed mobility training;gait training;neuromuscular re-education;orthotic fitting/training;patient/family education;strengthening;transfer training   Anticipated Equipment Needs at Discharge (PT)   (WC vs knee scooter)   Risk & Benefits of therapy have been explained evaluation/treatment results reviewed;care plan/treatment goals reviewed;participants voiced agreement with care plan;participants included;patient   PT Total Evaluation Time   PT Eval, Moderate Complexity Minutes (99330) 15   Therapy Certification   Start of care date 08/08/23   Certification date from 08/08/23   Certification date to 08/15/23   Medical Diagnosis osteomyelitis of ankle and foot   Physical Therapy Goals   PT Frequency Daily   PT Predicted Duration/Target Date for Goal Attainment 08/15/23   PT Goals Transfers;Gait   PT: Transfers Minimal assist;Sit to/from stand;Assistive device   PT: Gait Minimal assist;25 feet  (with knee walker)   Interventions   Interventions Quick Adds Gait Training;Therapeutic Activity   Therapeutic Activity   Therapeutic Activities: dynamic activities to improve functional performance Minutes (62156) 15   Treatment Detail/Skilled Intervention pt ed on NWB status and transfer technique with PT demo and VCs/MCs. required elevated bed in order to achieve sit to stand with modAx1 on second attempt. pt  able to maintain NWB to pivot to chair with FWW, Juan Jose. Stand>sit with Juan Jose and cues for sequencing and controlling descent with UEs. Tolerated well without LE pain. pt in chair with call light in hand at end of PT.   PT Discharge Planning   PT Plan NWB RLE trial knee walker   PT Discharge Recommendation (DC Rec) Transitional Care Facility   PT Rationale for DC Rec lives alone, requires assistance with transfers   PT Brief overview of current status min-modA to transfer from bed to chair         Pt was educated on the role of physical therapy in their care, and indicated understanding.      Brittany Quiroz, DPT 8/8/2023                                                                                    [facility]      OUTPATIENT PHYSICAL THERAPY EVALUATION  PLAN OF TREATMENT FOR OUTPATIENT REHABILITATION  (COMPLETE FOR INITIAL CLAIMS ONLY)  Patient's Last Name, First Name, M.I.  YOB: 1945  Lance Ibrahim                        Provider's Name  [facility] Medical Record No.  [id number]                               Onset Date:  08/07/23   Start of Care Date:  (P) 08/08/23      Type:     _X_PT   ___OT   ___SLP Medical Diagnosis:  (P) osteomyelitis of ankle and foot                        PT Diagnosis:  (P) gait and transfer deficits   Visits from SOC:  1   _________________________________________________________________________________  Plan of Treatment/Functional Goals    Planned Interventions: (P) balance training, bed mobility training, gait training, neuromuscular re-education, orthotic fitting/training, patient/family education, strengthening, transfer training     Goals: See Physical Therapy Goals on Care Plan in [facility] electronic health record.    Therapy Frequency: (P) Daily  Predicted Duration of Therapy Intervention: (P) 08/15/23  _________________________________________________________________________________    I CERTIFY THE NEED  FOR THESE SERVICES FURNISHED UNDER        THIS PLAN OF TREATMENT AND WHILE UNDER MY CARE .             Physician Signature               Date    X_____________________________________________________                  Certification date from: (P) 08/08/23, Certification date to: (P) 08/15/23    Referring Physician: (KATIANA) Nakul Salazar DPM            Initial Assessment        See Physical Therapy evaluation dated (P) 08/08/23 in Epic electronic health record.

## 2023-08-08 NOTE — CONSULTS
[facility]  Consult Note - Hospitalist Service  Date of Admission:  8/7/2023  Consult Requested by: Nakul Solorzano  Reason for Consult: Medical management of Diabetes    Assessment & Plan   Lance Ibrahim is a 78 year old male admitted on 8/7/2023. He with DM, HTN, CAD, HLD, anemia admitted for OM of right hallux s/p amputation. Hospitalist medicine consulted for management of diabetes mellitus      Osteomyelitis of right hallux  - s/p amputation of right hallux  -vancomycin and cefepime based on previous swab culture. Penicillin allergy but tolerated cefepime before (confirmed with pharmacist on call)  - follow culture  - ID consulted  - postop care and DVT ppx per podiatry    Diabetes mellitus type 2  - accu-checks, sliding scale, hypoglycemia  -HbA1c: 7.3% 2 weeks ago    Essential hypertension  - monitor per protocol  - c/w PTA lisinopril, labetalol, imdur    CAD  -resume aspirin when ok with podiatry    CMP, HFmrEF  -c/w PTA torsemide    BPH  -c/w proscar, tamsulosine    HFpEF  - not in exacerbation  - c/w PTA torsemide    Hyperlipidemia  - c/ PTA atorvastatin    Anemia  -chronic  - monitor cbc  - c/w PTA ferrous sulfate    Gout   - c/w PTA colchicine             Clinically Significant Risk Factors Present on Admission                # Drug Induced Platelet Defect: home medication list includes an antiplatelet medication   # Hypertension: Noted on problem list     # DMII: A1C = 7.3 % (Ref range: 0.0 - 5.6 %) within past 6 months             Paige Jalloh MD  Hospitalist Service  Securely message with [employer] (more info)  Text page via [facility] Paging/Directory   ______________________________________________________________________    Chief Complaint   Admitted for right hallux amputation    History is obtained from the patient    History of Present Illness   Lance Ibraihm is a 78 year old male who has h/o DM, HTN, CAD, HLD, anemia admitted for OM of right hallux s/p amputation.  Hospitalist medicine consulted for management of diabetes mellitus      Past Medical History    Past Medical History:   Diagnosis Date    Anemia     Arthritis     osteoarthritis knees    BPH     CAD (coronary artery disease)     subtotal occlusion in the small distal LAD     Cardiomyopathy     Cardiomyopathy (H)     Cerebral artery occlusion with cerebral infarction     TIA 1993, no residual    Cervicalgia     Chronic kidney disease     Chronic low back pain     Chronic rhinitis     Chronic rhinitis     Gouty arthropathy     HTN (hypertension)     Hyperlipidemia     Hypertension     Kidney stone     Nocturia     Obesity     PVD (peripheral vascular disease)     Sleep apnea     doesn't use cpap    TIA (transient ischaemic attack) 1993    Type 2 diabetes mellitus - 11/08/2018    Ureteral stone        Past Surgical History   Past Surgical History:   Procedure Laterality Date    AMPUTATE FOOT Right 8/7/2023    Procedure: AMPUTATION, right hallux;  Surgeon: Nakul Salazar DPM;  Location: [facility] OR    AMPUTATE TOE(S) Left 10/15/2018    Procedure: AMPUTATE TOE(S);  Left third toe amputation;  Surgeon: Ayaka Azevedo DPM, Podiatry/Foot and Ankle Surgery;  Location:  OR    ARTHROPLASTY KNEE Right 12/07/2018    Procedure: Right total knee arthroplasty;  Surgeon: Issa Cunha MD;  Location:  OR    COLONOSCOPY  03/09/2013    Procedure: COLONOSCOPY;  COLONOSCOPY;  Surgeon: Chau Hogan MD;  Location:  GI    CV HEART CATHETERIZATION WITH POSSIBLE INTERVENTION Left 03/05/2019    Procedure: Coronary Angiogram;  Surgeon: Nas Linda MD;  Location:  HEART CARDIAC CATH LAB    Diastasis recti repair  1985    EXTRACAPSULAR CATARACT EXTRATION WITH INTRAOCULAR LENS IMPLANT Left 03/13/2017    EXTRACAPSULAR CATARACT EXTRATION WITH INTRAOCULAR LENS IMPLANT Right     FOOT SURGERY  04/2013    cyst removal     HERNIA REPAIR  07/13/2004    ventral     ORIF Shoulder Left     Ventral hernia repair NOS   1987       Medications   I have reviewed this patient's current medications       Review of Systems    The 10 point Review of Systems is negative other than noted in the HPI or here.      Physical Exam   Vital Signs: Temp: 97.7  F (36.5  C) Temp src: Oral BP: 122/59 Pulse: 70   Resp: 18 SpO2: 97 % O2 Device: Nasal cannula Oxygen Delivery: 2 LPM  Weight: 218 lbs 8 oz    GEN: Alert and oriented. Not in acute distress.  HEENT: Atraumatic, mucous membrane- moist and pink.  Chest: Bilateral air entry.  CVS: S1S2 regular.   Abdomen: Soft. Non-tender, non-distended. No organomegaly. No guarding or rigidity. Bowel sounds active.   Extremities: Right foot dressing c/d/i  CNS: No involuntary movements.  Skin: no cyanosis or clubbing.     Medical Decision Making             Data

## 2023-08-08 NOTE — PLAN OF CARE
PRIMARY DIAGNOSIS: SOFT TISSUE INFECTIONS  OUTPATIENT/OBSERVATION GOALS TO BE MET BEFORE DISCHARGE:  Vitals sign stable or return to baseline: Yes    Tolerating oral antibiotics or has home infusion set up if applicable: No    Pain status: Improved-controlled with oral pain medications.    Return to near baseline physical activity: No    Discharge Planner Nurse   Safe discharge environment identified: No  Barriers to discharge: Yes       Entered by: Ekta Karimi RN 08/08/2023 6:23 PM     Please review provider order for any additional goals.     Nurse to notify provider when observation goals have been met and patient is ready for discharge.Goal Outcome Evaluation:  Patient alert and oriented x4. Moving with assist of 1-2 using walker and gait belt. NWB to right foot, Prafo boot in place. Vitals stable on 2 L of oxygen via nasal cannula. Patient desats when sleeping. Regular diet with adequate intake. IV antibiotics. Saline locked. Voiding adequately using urinal. Blood sugar stable. Dressing to right foot is clean, dry and intact. Patient endorses minimal pain in back, scheduled tylenol given.

## 2023-08-08 NOTE — PROGRESS NOTES
S: Pt. seen at [facility]. Male. Dr. Salazar. RX: PRARAVI right. DX: Right foot surgery.  O:A: Today I F/D an [employer] PRAFO right. Donning doffing wear and care instructions given.  P: Pt. to be seen as needed.    Seth AMBRIZ,LO

## 2023-08-08 NOTE — CARE PLAN
PRIMARY DIAGNOSIS: ACUTE PAIN  OUTPATIENT/OBSERVATION GOALS TO BE MET BEFORE DISCHARGE:  1. Pain Status: Improved-controlled with oral pain medications.    2. Return to near baseline physical activity: No    3. Cleared for discharge by consultants (if involved): No    Discharge Planner Nurse   Safe discharge environment identified: No  Barriers to discharge: Yes       Entered by: Macy Santana RN 08/07/2023 9:22 PM     Please review provider order for any additional goals.   Nurse to notify provider when observation goals have been met and patient is ready for discharge.

## 2023-08-08 NOTE — PROGRESS NOTES
[facility]    Medicine Progress Note - Hospitalist Service    Date of Admission:  8/7/2023    Assessment & Plan     Lance Ibrahim is a 78 year old male admitted on 8/7/2023. Has history of DM, HTN, CAD, HLD, anemia admitted for OM of right hallux s/p amputation. Hospitalist medicine consulted for management of diabetes mellitus        Osteomyelitis of right hallux: s/p amputation of right hallux  - Per ID, continue vancomycin and cefepime   - follow up intra-op culture  - Nonweightbearing on the right foot at all times   - PRAFO boot at all times     Diabetes mellitus type 2: well controlled with HbA1c: 7.3 two weeks ago  -Novolog sliding scale     Essential hypertension: continue PTA lisinopril, labetalol, imdur     CAD: resume aspirin     HFrEF: euvolemic. c/w PTA torsemide    CKD, stage IIIb: creatinine at baseline.      BPH: c/w proscar, tamsulosine     Hyperlipidemia  - c/ PTA atorvastatin     Chronic anemia: c/w PTA ferrous sulfate     Gout:  c/w PTA colchicine       Diet: Advance Diet as Tolerated: Regular Diet Adult    DVT Prophylaxis: Heparin SQ  Barnes Catheter: Not present  Lines: None     Cardiac Monitoring: None  Code Status: Full Code      Clinically Significant Risk Factors Present on Admission                # Drug Induced Platelet Defect: home medication list includes an antiplatelet medication   # Hypertension: Noted on problem list     # DMII: A1C = 7.3 % (Ref range: 0.0 - 5.6 %) within past 6 months             Disposition Plan      Expected Discharge Date: 08/09/2023    Discharge Delays: Lab Result Pending (enter specific test & time in comments)  Placement - TCU  Destination: other (comment) (TCU)            Zeferino Lange MD  Hospitalist Service  [facility]  Securely message with [employer] (more info)  Text page via [employer] Paging/Directory   ______________________________________________________________________    Interval History   Patient reports  having pain from right foot during transfer. He also has left knee pain from osteoarthritis.     Physical Exam   Vital Signs: Temp: 98  F (36.7  C) Temp src: Oral BP: 96/55 Pulse: 79   Resp: 18 SpO2: 97 % O2 Device: None (Room air) Oxygen Delivery: 2 LPM  Weight: 218 lbs 8 oz    General appearance: not in acute distress  HEENT: PERRL, EOMI  Lungs: Clear breath sounds in bilateral lung fields  Cardiovascular: Regular rate and rhythm, normal S1-S2  Abdomen: Soft, non tender, no distension  Musculoskeletal: No joint swelling. Right foot in surgical dressing.  Skin: No rash and no edema  Neurology: AAO ×3.  Cranial nerves II - XII normal.  Normal muscle strength in all four extremities.     Medical Decision Making       45 MINUTES SPENT BY ME on the date of service doing chart review, history, exam, documentation & further activities per the note.      Data     I have personally reviewed the following data over the past 24 hrs:    6.6  \   9.9 (L)   / 244     140 106 40.1 (H) /  163 (H)   4.7 25 1.87 (H) \       Imaging results reviewed over the past 24 hrs:   No results found for this or any previous visit (from the past 24 hour(s)).

## 2023-08-08 NOTE — PROGRESS NOTES
FOOT AND ANKLE SURGERY/PODIATRY Progress Note        ASSESSMENT:   S/p amputation right hallux  DM2      TREATMENT:  -Doing well today. Denies foot pain. Pathology and cultures pending. WBC 6.6. Afebrile.    -I reviewed yesterday's surgery with the patient to include amputation of the right hallux with primary closure.  We discussed the importance of nonweightbearing on the right foot at all times, elevation of right foot above his waist at all times with use of PRAFO boot.    -Surgical dressing to remain intact.     -Medical management per hospitalist. Antibiotics per ID.  Patient is okay for discharge to TCU from podiatry perspective pending final ID input.  Appreciate care management assistance with TCU placement.  I would like to see him for follow-up in approximately 7 to 10 days.    Nakul Salazar DPM  [facility] Podiatry/Foot & Ankle Surgery      HPI: Lance Ibrahim was seen again today s/p amputation right hallux.  Patient resting comfortably this morning.  Denies foot pain.    Past Medical History:   Diagnosis Date    Anemia     Arthritis     osteoarthritis knees    BPH     CAD (coronary artery disease)     subtotal occlusion in the small distal LAD     Cardiomyopathy     Cardiomyopathy (H)     Cerebral artery occlusion with cerebral infarction     TIA 1993, no residual    Cervicalgia     Chronic kidney disease     Chronic low back pain     Chronic rhinitis     Chronic rhinitis     Gouty arthropathy     HTN (hypertension)     Hyperlipidemia     Hypertension     Kidney stone     Nocturia     Obesity     PVD (peripheral vascular disease)     Sleep apnea     doesn't use cpap    TIA (transient ischaemic attack) 1993    Type 2 diabetes mellitus - 11/08/2018    Ureteral stone        Past Surgical History:   Procedure Laterality Date    AMPUTATE FOOT Right 8/7/2023    Procedure: AMPUTATION, right hallux;  Surgeon: Nakul Salazar DPM;  Location: [facility] OR    AMPUTATE TOE(S) Left 10/15/2018     "Procedure: AMPUTATE TOE(S);  Left third toe amputation;  Surgeon: Ayaka Azevedo DPM, Podiatry/Foot and Ankle Surgery;  Location: [facility] OR    ARTHROPLASTY KNEE Right 12/07/2018    Procedure: Right total knee arthroplasty;  Surgeon: Issa Cunha MD;  Location: [facility] OR    COLONOSCOPY  03/09/2013    Procedure: COLONOSCOPY;  COLONOSCOPY;  Surgeon: Chau Hogan MD;  Location:  GI    CV HEART CATHETERIZATION WITH POSSIBLE INTERVENTION Left 03/05/2019    Procedure: Coronary Angiogram;  Surgeon: Nas Linda MD;  Location:  HEART CARDIAC CATH LAB    Diastasis recti repair  1985    EXTRACAPSULAR CATARACT EXTRATION WITH INTRAOCULAR LENS IMPLANT Left 03/13/2017    EXTRACAPSULAR CATARACT EXTRATION WITH INTRAOCULAR LENS IMPLANT Right     FOOT SURGERY  04/2013    cyst removal     HERNIA REPAIR  07/13/2004    ventral     ORIF Shoulder Left     Ventral hernia repair NOS  1987       Allergies   Allergen Reactions    Penicillins Anaphylaxis     8/25/22 - tolerated cefepime     Sulfa Antibiotics      \"itchy rash, swelling of face and hands\"    Ancef [Cefazolin] Rash         Current Facility-Administered Medications:     [START ON 8/10/2023] acetaminophen (TYLENOL) tablet 650 mg, 650 mg, Oral, Q4H PRN, Nakul Salazar DPM    acetaminophen (TYLENOL) tablet 975 mg, 975 mg, Oral, Q8H, Nakul Salazar DPM, 975 mg at 08/08/23 0209    amLODIPine (NORVASC) tablet 5 mg, 5 mg, Oral, Daily, Paige Jalloh MD    [Held by provider] aspirin EC tablet 81 mg, 81 mg, Oral, Q24H, Paige Jalloh MD    atorvastatin (LIPITOR) tablet 40 mg, 40 mg, Oral, Daily, Paige Jalloh MD    bisacodyl (DULCOLAX) suppository 10 mg, 10 mg, Rectal, Daily PRN, Nakul Salazar DPM    ceFEPIme (MAXIPIME) 1g vial to attach to  ml bag for ADULTS or NS 50 ml bag for PEDS, 1 g, Intravenous, Q8H, Paige Jalloh MD, 1 g at 08/08/23 0550    clindamycin (CLEOCIN) in D5W injection PEDS/NICU 900 mg, 900 mg, Intravenous, " See Admin Instructions, Nakul Salazar DPM    colchicine (COLCRYS) tablet 0.6 mg, 0.6 mg, Oral, Daily, Paige Jalloh MD    glucose gel 15-30 g, 15-30 g, Oral, Q15 Min PRN **OR** dextrose 50 % injection 25-50 mL, 25-50 mL, Intravenous, Q15 Min PRN **OR** glucagon injection 1 mg, 1 mg, Subcutaneous, Q15 Min PRN, Paige Jalloh MD    fentaNYL (PF) (SUBLIMAZE) injection  mcg,  mcg, Intravenous, q1 min prn, Nakul Salazar DPM    ferrous gluconate (FERGON) tablet 324 mg, 324 mg, Oral, Daily, Paige Jalloh MD    finasteride (PROSCAR) tablet 5 mg, 5 mg, Oral, Daily, Paige Jalloh MD    fluticasone (FLONASE) 50 MCG/ACT spray 1-2 spray, 1-2 spray, Nasal, Daily, Paige Jalloh MD    gabapentin (NEURONTIN) capsule 300 mg, 300 mg, Oral, TID, Paige Jalloh MD, 300 mg at 08/07/23 2229    HYDROmorphone (DILAUDID) injection 0.2 mg, 0.2 mg, Intravenous, Q2H PRN **OR** HYDROmorphone (DILAUDID) injection 0.4 mg, 0.4 mg, Intravenous, Q2H PRN, Nakul Salazar DPM    insulin aspart (NovoLOG) injection (RAPID ACTING), 1-7 Units, Subcutaneous, TID AC, Paige Jalloh MD    insulin aspart (NovoLOG) injection (RAPID ACTING), 1-5 Units, Subcutaneous, At Bedtime, Paige Jalloh MD    isosorbide mononitrate (IMDUR) 24 hr tablet 30 mg, 30 mg, Oral, Daily, Paige Jalloh MD    labetalol (NORMODYNE) tablet 200 mg, 200 mg, Oral, BID, Paige Jalloh MD, 200 mg at 08/07/23 2228    lidocaine (LMX4) cream, , Topical, Q1H PRN, Nakul Salazar DPM    lidocaine (LMX4) cream, , Topical, Q1H PRN, Nakul Salazar DPM    lidocaine 1 % 0.1-1 mL, 0.1-1 mL, Other, Q1H PRN, Nakul Salazar DPM    lidocaine 1 % 0.1-1 mL, 0.1-1 mL, Other, Q1H PRN, Nakul Salazar DPM    lisinopril (ZESTRIL) tablet 10 mg, 10 mg, Oral, Daily, Paige Jalloh MD    loratadine (CLARITIN) tablet 10 mg, 10 mg, Oral, Daily, Paige Jalloh MD    magnesium hydroxide  (MILK OF MAGNESIA) suspension 30 mL, 30 mL, Oral, Daily PRN, Nakul Salazar DPM    midazolam (VERSED) injection 0.5-2 mg, 0.5-2 mg, Intravenous, q1 min prn, Nakul Salazar DPM    multivitamin w/minerals (THERA-VIT-M) tablet 1 tablet, 1 tablet, Oral, Daily, Paige Jalloh MD    naloxone (NARCAN) injection 0.2 mg, 0.2 mg, Intravenous, Q2 Min PRN **OR** naloxone (NARCAN) injection 0.4 mg, 0.4 mg, Intravenous, Q2 Min PRN **OR** naloxone (NARCAN) injection 0.2 mg, 0.2 mg, Intramuscular, Q2 Min PRN **OR** naloxone (NARCAN) injection 0.4 mg, 0.4 mg, Intramuscular, Q2 Min PRN, Nakul Salazar DPM    ondansetron (ZOFRAN ODT) ODT tab 4 mg, 4 mg, Oral, Q6H PRN **OR** ondansetron (ZOFRAN) injection 4 mg, 4 mg, Intravenous, Q6H PRN, Nakul Salazar DPM    oxyCODONE (ROXICODONE) tablet 5 mg, 5 mg, Oral, Q4H PRN **OR** oxyCODONE (ROXICODONE) tablet 10 mg, 10 mg, Oral, Q4H PRN, Nakul Salazar DPM    polyethylene glycol (MIRALAX) Packet 17 g, 17 g, Oral, Daily, Nakul Salazar DPM    prochlorperazine (COMPAZINE) injection 5 mg, 5 mg, Intravenous, Q6H PRN **OR** prochlorperazine (COMPAZINE) tablet 5 mg, 5 mg, Oral, Q6H PRN, Nakul Salazar DPM    senna-docusate (SENOKOT-S/PERICOLACE) 8.6-50 MG per tablet 1 tablet, 1 tablet, Oral, BID, Nakul Salazar DPM, 1 tablet at 08/07/23 2050    sodium chloride (PF) 0.9% PF flush 3 mL, 3 mL, Intracatheter, Q8H, Nakul Salazar DPM, 3 mL at 08/07/23 2229    sodium chloride (PF) 0.9% PF flush 3 mL, 3 mL, Intracatheter, q1 min prn, Nakul Salazar DPM    sodium chloride (PF) 0.9% PF flush 3 mL, 3 mL, Intracatheter, Q8H, Nakul Salzaar DPM, 3 mL at 08/08/23 0209    sodium chloride (PF) 0.9% PF flush 3 mL, 3 mL, Intracatheter, q1 min prn, Nakul Salazar DPM    tamsulosin (FLOMAX) capsule 0.4 mg, 0.4 mg, Oral, Daily, Paige Jalloh MD    tiZANidine (ZANAFLEX) tablet 2-4 mg, 2-4 mg, Oral, Q6H PRN,  Paige Jalloh MD    torsemide (DEMADEX) tablet 20 mg, 20 mg, Oral, Daily, Paige Jalloh MD    Family History   Problem Relation Age of Onset    Family History Negative Mother          88 yo    Cancer Father          76 yo brain    Diabetes Maternal Grandfather          89 yo    Alcohol/Drug Paternal Grandfather             Colon Cancer No family hx of        Social History     Socioeconomic History    Marital status:      Spouse name: Not on file    Number of children: Not on file    Years of education: Not on file    Highest education level: Not on file   Occupational History     Employer: SELF   Tobacco Use    Smoking status: Former     Packs/day: 0.00     Types: Cigarettes     Quit date: 3/17/1993     Years since quittin.4    Smokeless tobacco: Never   Vaping Use    Vaping Use: Never used   Substance and Sexual Activity    Alcohol use: Not Currently    Drug use: No    Sexual activity: Never   Other Topics Concern    Parent/sibling w/ CABG, MI or angioplasty before 65F 55M? Not Asked   Social History Narrative    Not on file     Social Determinants of Health     Financial Resource Strain: Not on file   Food Insecurity: Not on file   Transportation Needs: Not on file   Physical Activity: Not on file   Stress: Not on file   Social Connections: Not on file   Intimate Partner Violence: Not on file   Housing Stability: Not on file       10 point Review of Systems is negative except for amputation right hallux which is noted in HPI.     /66 (BP Location: Right arm)   Pulse 71   Temp 97.8  F (36.6  C) (Oral)   Resp 20   Wt 99.1 kg (218 lb 8 oz)   SpO2 96%   BMI 29.63 kg/m      BMI= Body mass index is 29.63 kg/m .    OBJECTIVE:  General appearance: Patient is alert and fully cooperative with history & exam.  No sign of distress is noted during the visit.  Surgical dressing c/d/I right foot.    Imaging:     POC US Guidance Needle Placement    Result Date:  "8/7/2023  Ultrasound was performed as guidance to an anesthesia procedure.  Click \"PACS images\" hyperlink below to view any stored images.  For specific procedure details, view procedure note authored by anesthesia.    MR Foot Right w/o & w Contrast    Result Date: 7/26/2023  EXAM: MR FOOT RIGHT W/O and W CONTRAST LOCATION: [facility] DATE: 7/26/2023 INDICATION: Great toe ulcer, infection, concern for osteomyelitis. COMPARISON: None. TECHNIQUE: Routine. Additional postgadolinium T1 sequences were obtained. IV CONTRAST: 10ml Gadavist FINDINGS: Moderately reduced image quality due to motion artifact on multiple sequences. Severe bone marrow edema and enhancement in the phalanges of the great toe, effacing the normal T1 fat signal, throughout the distal phalanx and throughout the majority of the proximal phalanx (with minimal sparing of the proximal phalanx base), is compatible with acute osteomyelitis. The skin/soft tissue ulcer is difficult to visualize, but is favored to be along the plantar surface of the great toe, with areas of mild surface irregularity and soft tissue thinning. No fluid-filled tract is visualized. No phlegmon or abscess. There is soft tissue edema and enhancement in the great toe, consistent with cellulitis. No joint effusion for clear evidence of septic arthritis. No evidence of reactive osteitis or osteomyelitis in the right forefoot elsewhere. No fracture. Mild bone marrow edema in the head and neck of the fifth metatarsal, favored to represent stress reaction, although contusion may appear similar. Severe degenerative arthrosis is present at the first MTP joint, with chronic bone-on-bone articulation, subchondral cystic change, and large osteophytes. Joint fluid is normal. No findings to indicate septic arthritis. Joint alignment within normal limits. No joint effusions in the lesser MTP joints in the toes, or in the visualized midfoot. Full-thickness tear of the " flexor hallucis longus tendon, which is retracted to the level of the metatarsal (coronal images 1-17). There is only a small amount of tendon sheath fluid within the flexor tendon sheath, but there is moderately prominent tenosynovial enhancement; given the appearance in the proximity of the tendon sheath to the infection in the distal aspect of the great toe, appearance is concerning for possible septic tenosynovitis The other flexor tendons are intact. The extensor tendons appear intact. No tenosynovial effusion/tenosynovitis. The Lisfranc ligament is visualized and is intact. Collateral ligaments are intact. Marked soft tissue edema over the dorsum of the forefoot.     IMPRESSION: 1.  Soft tissue thinning in the plantar aspect of the great toe, suspicious for an ulcer, likely clinically evident. 2.  Severe bone marrow edema throughout the distal and proximal phalanges of the great toe, compatible with osteomyelitis. Relative sparing of the minimal portion of the base of the proximal phalanx. 3.  Soft tissue edema and enhancement in the great toe compatible with cellulitis. No abscess or overt phlegmon. No other fluid collection; no septic arthritis or tenosynovitis evident. 4.  Severe degenerative arthrosis at the first MTP joint. 5.  Completely torn and retracted flexor hallucis longus tendon. There is only a small amount of tenosynovial fluid in the tendon sheath, but there is moderate tenosynovial enhancement; the appearance raises concern for concern for septic tenosynovitis, given the proximity of the tendon sheath to the infection at the distal aspect of the great toe. 6.  No evidence of ligament tear. 7.  Additional nonspecific soft tissue edema throughout the foot, greatest in the dorsal foot.

## 2023-08-08 NOTE — CARE PLAN
PRIMARY DIAGNOSIS: ACUTE PAIN  OUTPATIENT/OBSERVATION GOALS TO BE MET BEFORE DISCHARGE:  1. Pain Status: Improved-controlled with oral pain medications.    2. Return to near baseline physical activity: No    3. Cleared for discharge by consultants (if involved): No    Discharge Planner Nurse   Safe discharge environment identified: No  Barriers to discharge: yes       Entered by: Brenda Gupta RN 08/08/2023 2:57 PM     Please review provider order for any additional goals.   Nurse to notify provider when observation goals have been met and patient is ready for discharge. ]

## 2023-08-08 NOTE — CARE PLAN
PRIMARY DIAGNOSIS: ACUTE PAIN  OUTPATIENT/OBSERVATION GOALS TO BE MET BEFORE DISCHARGE:  1. Pain Status: Improved-controlled with oral pain medications.    2. Return to near baseline physical activity: No    3. Cleared for discharge by consultants (if involved): No    Discharge Planner Nurse   Safe discharge environment identified: No  Barriers to discharge: yes       Entered by: Brenda Gupta RN 08/08/2023 2:55 PM   Patient AAO. C/O some back pain this shift, prn oxycodone given with relief. Satting mid 90's via 2L O2 NC, on continuous pulse oximetry. On IV abx. Patient voiding well, using urinal at bedside. Nurse elevated RLE on pillows this shift. Remains NWB on RLE.  and 137 this shift.   Please review provider order for any additional goals.   Nurse to notify provider when observation goals have been met and patient is ready for discharge. ]

## 2023-08-08 NOTE — CARE PLAN
PRIMARY DIAGNOSIS: ACUTE PAIN  OUTPATIENT/OBSERVATION GOALS TO BE MET BEFORE DISCHARGE:  1. Pain Status: Improved-controlled with oral pain medications.    2. Return to near baseline physical activity: No    3. Cleared for discharge by consultants (if involved): No    Discharge Planner Nurse   Safe discharge environment identified: No  Barriers to discharge: Yes Pt C/O minimal  pain control with scheduled Tylenol . incisional site, clean, dry and dressing intact. IVF and IV abx tolerated well .       Entered by: Macy Santana RN 08/08/2023 7:09 AM     Please review provider order for any additional goals.   Nurse to notify provider when observation goals have been met and patient is ready for discharge.

## 2023-08-09 ENCOUNTER — APPOINTMENT (OUTPATIENT)
Dept: PHYSICAL THERAPY | Facility: HOSPITAL | Age: 78
End: 2023-08-09
Attending: PODIATRIST
Payer: COMMERCIAL

## 2023-08-09 LAB
BACTERIA SPEC CULT: NO GROWTH
CREAT SERPL-MCNC: 1.62 MG/DL (ref 0.67–1.17)
GFR SERPL CREATININE-BSD FRML MDRD: 43 ML/MIN/1.73M2
GLUCOSE BLDC GLUCOMTR-MCNC: 106 MG/DL (ref 70–99)
GLUCOSE BLDC GLUCOMTR-MCNC: 125 MG/DL (ref 70–99)
GLUCOSE BLDC GLUCOMTR-MCNC: 146 MG/DL (ref 70–99)
GLUCOSE BLDC GLUCOMTR-MCNC: 169 MG/DL (ref 70–99)

## 2023-08-09 PROCEDURE — 250N000013 HC RX MED GY IP 250 OP 250 PS 637: Performed by: STUDENT IN AN ORGANIZED HEALTH CARE EDUCATION/TRAINING PROGRAM

## 2023-08-09 PROCEDURE — 250N000011 HC RX IP 250 OP 636: Performed by: PODIATRIST

## 2023-08-09 PROCEDURE — 250N000012 HC RX MED GY IP 250 OP 636 PS 637: Performed by: STUDENT IN AN ORGANIZED HEALTH CARE EDUCATION/TRAINING PROGRAM

## 2023-08-09 PROCEDURE — 250N000011 HC RX IP 250 OP 636: Mod: JZ | Performed by: STUDENT IN AN ORGANIZED HEALTH CARE EDUCATION/TRAINING PROGRAM

## 2023-08-09 PROCEDURE — 99214 OFFICE O/P EST MOD 30 MIN: CPT | Performed by: INTERNAL MEDICINE

## 2023-08-09 PROCEDURE — 250N000013 HC RX MED GY IP 250 OP 250 PS 637: Performed by: PODIATRIST

## 2023-08-09 PROCEDURE — 96372 THER/PROPH/DIAG INJ SC/IM: CPT | Performed by: INTERNAL MEDICINE

## 2023-08-09 PROCEDURE — 97116 GAIT TRAINING THERAPY: CPT | Mod: GP

## 2023-08-09 PROCEDURE — 82962 GLUCOSE BLOOD TEST: CPT

## 2023-08-09 PROCEDURE — 36415 COLL VENOUS BLD VENIPUNCTURE: CPT | Performed by: STUDENT IN AN ORGANIZED HEALTH CARE EDUCATION/TRAINING PROGRAM

## 2023-08-09 PROCEDURE — 99232 SBSQ HOSP IP/OBS MODERATE 35: CPT | Performed by: INTERNAL MEDICINE

## 2023-08-09 PROCEDURE — 250N000011 HC RX IP 250 OP 636: Mod: JZ | Performed by: INTERNAL MEDICINE

## 2023-08-09 PROCEDURE — G0378 HOSPITAL OBSERVATION PER HR: HCPCS

## 2023-08-09 PROCEDURE — 82565 ASSAY OF CREATININE: CPT | Performed by: STUDENT IN AN ORGANIZED HEALTH CARE EDUCATION/TRAINING PROGRAM

## 2023-08-09 PROCEDURE — 250N000013 HC RX MED GY IP 250 OP 250 PS 637: Performed by: INTERNAL MEDICINE

## 2023-08-09 RX ORDER — LIDOCAINE 4 G/G
1 PATCH TOPICAL
Status: DISCONTINUED | OUTPATIENT
Start: 2023-08-09 | End: 2023-08-10 | Stop reason: HOSPADM

## 2023-08-09 RX ADMIN — CEFEPIME HYDROCHLORIDE 1 G: 1 INJECTION, POWDER, FOR SOLUTION INTRAMUSCULAR; INTRAVENOUS at 06:41

## 2023-08-09 RX ADMIN — LABETALOL HYDROCHLORIDE 200 MG: 200 TABLET ORAL at 22:21

## 2023-08-09 RX ADMIN — ATORVASTATIN CALCIUM 40 MG: 40 TABLET, FILM COATED ORAL at 09:51

## 2023-08-09 RX ADMIN — GABAPENTIN 300 MG: 300 CAPSULE ORAL at 09:50

## 2023-08-09 RX ADMIN — HEPARIN SODIUM 5000 UNITS: 10000 INJECTION, SOLUTION INTRAVENOUS; SUBCUTANEOUS at 12:00

## 2023-08-09 RX ADMIN — COLCHICINE 0.6 MG: 0.6 TABLET ORAL at 09:48

## 2023-08-09 RX ADMIN — SENNOSIDES AND DOCUSATE SODIUM 1 TABLET: 50; 8.6 TABLET ORAL at 22:21

## 2023-08-09 RX ADMIN — LIDOCAINE 1 PATCH: 4 PATCH TOPICAL at 12:53

## 2023-08-09 RX ADMIN — INSULIN ASPART 1 UNITS: 100 INJECTION, SOLUTION INTRAVENOUS; SUBCUTANEOUS at 17:04

## 2023-08-09 RX ADMIN — FINASTERIDE 5 MG: 5 TABLET, FILM COATED ORAL at 09:51

## 2023-08-09 RX ADMIN — TAMSULOSIN HYDROCHLORIDE 0.4 MG: 0.4 CAPSULE ORAL at 09:51

## 2023-08-09 RX ADMIN — GABAPENTIN 300 MG: 300 CAPSULE ORAL at 22:21

## 2023-08-09 RX ADMIN — AMLODIPINE BESYLATE 5 MG: 5 TABLET ORAL at 09:50

## 2023-08-09 RX ADMIN — Medication 81 MG: at 09:50

## 2023-08-09 RX ADMIN — POLYETHYLENE GLYCOL 3350 17 G: 17 POWDER, FOR SOLUTION ORAL at 09:49

## 2023-08-09 RX ADMIN — LABETALOL HYDROCHLORIDE 200 MG: 200 TABLET ORAL at 09:48

## 2023-08-09 RX ADMIN — GABAPENTIN 300 MG: 300 CAPSULE ORAL at 13:08

## 2023-08-09 RX ADMIN — HEPARIN SODIUM 5000 UNITS: 10000 INJECTION, SOLUTION INTRAVENOUS; SUBCUTANEOUS at 22:20

## 2023-08-09 RX ADMIN — Medication 1 TABLET: at 09:50

## 2023-08-09 RX ADMIN — SENNOSIDES AND DOCUSATE SODIUM 1 TABLET: 50; 8.6 TABLET ORAL at 09:51

## 2023-08-09 RX ADMIN — VANCOMYCIN HYDROCHLORIDE 1000 MG: 1 INJECTION, SOLUTION INTRAVENOUS at 12:53

## 2023-08-09 RX ADMIN — ISOSORBIDE MONONITRATE 30 MG: 30 TABLET, EXTENDED RELEASE ORAL at 09:49

## 2023-08-09 RX ADMIN — OXYCODONE HYDROCHLORIDE 5 MG: 5 TABLET ORAL at 13:15

## 2023-08-09 RX ADMIN — LISINOPRIL 10 MG: 5 TABLET ORAL at 09:50

## 2023-08-09 RX ADMIN — INSULIN ASPART 1 UNITS: 100 INJECTION, SOLUTION INTRAVENOUS; SUBCUTANEOUS at 12:00

## 2023-08-09 RX ADMIN — FLUTICASONE PROPIONATE 1 SPRAY: 50 SPRAY, METERED NASAL at 09:49

## 2023-08-09 RX ADMIN — FERROUS GLUCONATE 324 MG: 324 TABLET ORAL at 09:48

## 2023-08-09 RX ADMIN — LORATADINE 10 MG: 10 TABLET ORAL at 09:50

## 2023-08-09 RX ADMIN — TORSEMIDE 20 MG: 20 TABLET ORAL at 09:48

## 2023-08-09 ASSESSMENT — ACTIVITIES OF DAILY LIVING (ADL)
ADLS_ACUITY_SCORE: 25
ADLS_ACUITY_SCORE: 29
ADLS_ACUITY_SCORE: 29
ADLS_ACUITY_SCORE: 25
ADLS_ACUITY_SCORE: 26
ADLS_ACUITY_SCORE: 29
ADLS_ACUITY_SCORE: 25

## 2023-08-09 NOTE — PLAN OF CARE
PRIMARY DIAGNOSIS: Post op day 2 R great toe amputation dt osteomyelitis   OUTPATIENT/OBSERVATION GOALS TO BE MET BEFORE DISCHARGE:  ADLs back to baseline: No    Activity and level of assistance: Assist of 2 dt non weight bearing on R foot and arthritis in L knee   Pain status: Improved-controlled with oral pain medications.    Return to near baseline physical activity: No     Discharge Planner Nurse   Safe discharge environment identified: No  Barriers to discharge: Yes       Entered by: Ekta Peraza RN 08/09/2023 6:09 AM     Please review provider order for any additional goals.   Nurse to notify provider when observation goals have been met and patient is ready for discharge.Goal Outcome Evaluation:    Patient reports being free of pain overnight, requested to be left alone as much as possible to sleep. IV antibiotics administered per order, dressing clean dry intact and boot on. Podiatry and ID following.     Ekta Peraza RN

## 2023-08-09 NOTE — PROGRESS NOTES
[facility]    Medicine Progress Note - Hospitalist Service    Date of Admission:  8/7/2023    Assessment & Plan     Lance Ibrahim is a 78 year old male admitted on 8/7/2023. Has history of DM, HTN, CAD, HLD, anemia admitted for OM of right hallux s/p amputation. Hospitalist medicine consulted for management of diabetes mellitus.      Osteomyelitis of right hallux: s/p amputation of right hallux  - Per ID, continue vancomycin and cefepime   - follow up intra-op culture  - Nonweightbearing on the right foot at all times   - PRAFO boot at all times     Diabetes mellitus type 2: well controlled with HbA1c: 7.3 two weeks ago  -Novolog sliding scale     Essential hypertension: continue PTA lisinopril, labetalol, imdur     CAD: resume aspirin     HFrEF: euvolemic. c/w PTA torsemide    CKD, stage IIIb: creatinine at baseline.      BPH: c/w proscar, tamsulosine     Hyperlipidemia  - c/ PTA atorvastatin     Chronic anemia: c/w PTA ferrous sulfate     Gout:  c/w PTA colchicine       Diet: Advance Diet as Tolerated: Regular Diet Adult    DVT Prophylaxis: Heparin SQ  Barnes Catheter: Not present  Lines: None     Cardiac Monitoring: None  Code Status: Full Code      Clinically Significant Risk Factors Present on Admission                  # Drug Induced Platelet Defect: home medication list includes an antiplatelet medication     # Hypertension: Noted on problem list     # DMII: A1C = 7.3 % (Ref range: 0.0 - 5.6 %) within past 6 months             Disposition Plan      Expected Discharge Date: 08/10/2023    Discharge Delays: Lab Result Pending (enter specific test & time in comments)  Placement - TCU  Destination: other (comment) (TCU)  Discharge Comments: likely PO atx 8/10          Zeferino Lange MD  Hospitalist Service  [facility]  Securely message with [employer] (more info)  Text page via [employer] Paging/Directory    ______________________________________________________________________    Interval History   Patient reports his chronic lower back pain flared up due to laying in bed. He states that lidocaine patch is normally effective. He otherwise feels well.    Physical Exam   Vital Signs: Temp: 98.8  F (37.1  C) Temp src: Oral BP: 98/51 Pulse: 82   Resp: 18 SpO2: 98 % O2 Device: None (Room air) Oxygen Delivery: 2 LPM  Weight: 218 lbs 8 oz    General appearance: not in acute distress  HEENT: PERRL, EOMI  Lungs: Clear breath sounds in bilateral lung fields  Cardiovascular: Regular rate and rhythm, normal S1-S2  Abdomen: Soft, non tender, no distension  Musculoskeletal: No joint swelling. Right foot in surgical dressing.  Skin: No rash and no edema  Neurology: AAO ×3.  Cranial nerves II - XII normal.  Normal muscle strength in all four extremities.     Medical Decision Making       45 MINUTES SPENT BY ME on the date of service doing chart review, history, exam, documentation & further activities per the note.      Data     I have personally reviewed the following data over the past 24 hrs:    N/A  \   N/A   / N/A     N/A N/A N/A /  146 (H)   N/A N/A 1.62 (H) \     Imaging results reviewed over the past 24 hrs:   No results found for this or any previous visit (from the past 24 hour(s)).

## 2023-08-09 NOTE — PROGRESS NOTES
Patient status post amputation right hallux.  Pathology pending.  Surgical dressing to remain intact.  Continue nonweightbearing right foot.  Medical management per hospitalist.  Antibiotics per ID, appreciate input.  Patient okay for discharge to TCU pending final ID input.  I like to see him for follow-up in approximately 7 to 10 days.

## 2023-08-09 NOTE — PLAN OF CARE
PRIMARY DIAGNOSIS: Osteomyelitis of R great toe- post op    OUTPATIENT/OBSERVATION GOALS TO BE MET BEFORE DISCHARGE:  ADLs back to baseline: No    Activity and level of assistance: Assist of 2 dt non weightbearing on R foot and arthritis in L knee     Pain status: Improved-controlled with oral pain medications.    Return to near baseline physical activity: No     Discharge Planner Nurse   Safe discharge environment identified: No  Barriers to discharge: Yes       Entered by: Ekta Peraza RN 08/09/2023 2:18 AM     Please review provider order for any additional goals.   Nurse to notify provider when observation goals have been met and patient is ready for discharge.

## 2023-08-09 NOTE — UTILIZATION REVIEW
Concurrent stay review; Secondary Review Determination     Under the authority of the Utilization Management Committee, the utilization review process indicated a secondary review on Lance Ibrahim.  The review outcome is based on review of the medical records, discussions with staff, and applying clinical experience noted on the date of the review.        (x) Observation Status Appropriate - Concurrent stay review    RATIONALE FOR DETERMINATION   78-year-old male with right hallux osteomyelitis status post amputation on August 7.  ID following.  Cultures collected and pending as is pathology.  Continuing cefepime and vancomycin with likely transition to oral antibiotics tomorrow.  Wound appears to be healing well with no complications.  [facility] and all other medical issues appear stable.  Being culture results and placement.    Patient is clinically improving and there is no clear indication to change patient's status to inpatient. The severity of illness, intensity of service provided, expected LOS and risk for adverse outcome make the care appropriate for observation.    The information on this document is developed by the utilization review team in order for the business office to ensure compliance.  This only denotes the appropriateness of proper admission status and does not reflect the quality of care rendered.         The definitions of Inpatient Status and Observation Status used in making the determination above are those provided in the CMS Coverage Manual, Chapter 1 and Chapter 6, section 70.4.      Sincerely,   Pato Osman MD  Utilization Review  Physician Advisor  [facility]

## 2023-08-09 NOTE — PLAN OF CARE
PRIMARY DIAGNOSIS: ACUTE PAIN  OUTPATIENT/OBSERVATION GOALS TO BE MET BEFORE DISCHARGE:  1. Pain Status: Improved-controlled with oral pain medications.    2. Return to near baseline physical activity: Yes    3. Cleared for discharge by consultants (if involved): No    Discharge Planner Nurse   Safe discharge environment identified: No  Barriers to discharge: No       Entered by: Margarita Dorman RN 08/09/2023 5:21 PM     Please review provider order for any additional goals.   Nurse to notify provider when observation goals have been met and patient is ready for discharge.Goal Outcome Evaluation:  Pt has chronic low back and knee pain that interfere with activity prior to foot surgery.  He complained of having painful sleep related to the hospital bed not being comfortable.  Obtained an air pump for improved comfort and management of pink skin over sacrum.

## 2023-08-09 NOTE — PROGRESS NOTES
Infectious Diseases Progress Note  [facility]    Date of visit: 08/09/2023         ASSESSMENT   78-year-old man with history of coronary disease, hypertension, lipidemia, diabetes who is admitted for right hallux osteomyelitis.    Chronic, nonhealing ulcer on the right hallux.  Followed in podiatry.  Recently on doxycycline, then levofloxacin prior to admission.  Wound culture prior to admission with Citrobacter, Pseudomonas, and Enterococcus.  Right hallux osteomyelitis.  Recent MRI showing osteomyelitis at the distal and proximal phalanx.  Status post amputation in toto on 8/7.  Cultures collected and pending.  Pathology pending    Principal Problem:    Osteomyelitis of ankle and foot (H)  Active Problems:    Hypertension    Type 2 diabetes, HbA1c goal < 7% (H)    Hypertrophy of prostate with urinary obstruction    CKD (chronic kidney disease) stage 3, GFR 30-59 ml/min (H)    Chronic systolic congestive heart failure (H)       PLAN   -Continue cefepime and vancomycin  -Follow-up on intraoperative cultures  -likely transition to po antibiotics tomorrow based on cultures.       Koko Wolfe MD  [facility]  Direct messaging: [employer] Paging  On-Call ID provider: [phone], option: 9      ===========================================    SUBJECTIVE / INTERVAL HISTORY:     No events. Feeling okay. Uncomfortable positioning in bed making back pain worse.      Antibiotics   Cefepime 8/7-  Vancomycin 8/8-    Previous:  Clindamycin 8/7  Levofloxacin prior to admission      Physical Exam     Temp:  [97.5  F (36.4  C)-98.5  F (36.9  C)] 97.7  F (36.5  C)  Pulse:  [78-85] 79  Resp:  [15-18] 18  BP: ()/(55-59) 112/55  SpO2:  [95 %-97 %] 96 %    /55 (BP Location: Right arm)   Pulse 79   Temp 97.7  F (36.5  C) (Oral)   Resp 18   Wt 99.1 kg (218 lb 8 oz)   SpO2 96%   BMI 29.63 kg/m      GENERAL:  well-developed, well-nourished, lying in bed in no acute distress.   HENT:   "Head is normocephalic, atraumatic.   EYES:  Eyes have anicteric sclerae without conjunctival injection   LUNGS:  normal resp pattern  EXT: Lower extremity edema  SKIN:  No acute rashes.  Right foot wrapped.   NEUROLOGIC:  Grossly nonfocal.      Cultures   Previous cultures reviewed and summarized above  8/7 right foot: No organisms on Gram stain; culture no growth to date           Pertinent Labs:     Recent Labs   Lab 08/08/23  0554   WBC 6.6   HGB 9.9*          Recent Labs   Lab 08/08/23  0554      CO2 25   BUN 40.1*       No results for input(s): CRP, SED in the last 168 hours.        Imaging:     POC US Guidance Needle Placement    Result Date: 8/7/2023  Ultrasound was performed as guidance to an anesthesia procedure.  Click \"PACS images\" hyperlink below to view any stored images.  For specific procedure details, view procedure note authored by anesthesia.        Data reviewed today: I reviewed all medications, new labs and imaging results over the last 24 hours. I personally reviewed no images or EKG's today.  The patient's care was discussed with the Patient.    "

## 2023-08-10 VITALS
OXYGEN SATURATION: 98 % | DIASTOLIC BLOOD PRESSURE: 69 MMHG | SYSTOLIC BLOOD PRESSURE: 133 MMHG | BODY MASS INDEX: 29.63 KG/M2 | HEART RATE: 92 BPM | RESPIRATION RATE: 18 BRPM | WEIGHT: 218.5 LBS | TEMPERATURE: 98.7 F

## 2023-08-10 PROBLEM — I25.10 CORONARY ARTERY DISEASE INVOLVING NATIVE CORONARY ARTERY OF NATIVE HEART WITHOUT ANGINA PECTORIS: Status: ACTIVE | Noted: 2023-08-10

## 2023-08-10 LAB
CREAT SERPL-MCNC: 1.53 MG/DL (ref 0.67–1.17)
GFR SERPL CREATININE-BSD FRML MDRD: 46 ML/MIN/1.73M2
GLUCOSE BLDC GLUCOMTR-MCNC: 152 MG/DL (ref 70–99)

## 2023-08-10 PROCEDURE — 250N000013 HC RX MED GY IP 250 OP 250 PS 637: Performed by: INTERNAL MEDICINE

## 2023-08-10 PROCEDURE — G0378 HOSPITAL OBSERVATION PER HR: HCPCS

## 2023-08-10 PROCEDURE — 99239 HOSP IP/OBS DSCHRG MGMT >30: CPT | Performed by: INTERNAL MEDICINE

## 2023-08-10 PROCEDURE — 82962 GLUCOSE BLOOD TEST: CPT

## 2023-08-10 PROCEDURE — 250N000013 HC RX MED GY IP 250 OP 250 PS 637: Performed by: PODIATRIST

## 2023-08-10 PROCEDURE — 36415 COLL VENOUS BLD VENIPUNCTURE: CPT | Performed by: STUDENT IN AN ORGANIZED HEALTH CARE EDUCATION/TRAINING PROGRAM

## 2023-08-10 PROCEDURE — 250N000013 HC RX MED GY IP 250 OP 250 PS 637: Performed by: STUDENT IN AN ORGANIZED HEALTH CARE EDUCATION/TRAINING PROGRAM

## 2023-08-10 PROCEDURE — 82565 ASSAY OF CREATININE: CPT | Performed by: STUDENT IN AN ORGANIZED HEALTH CARE EDUCATION/TRAINING PROGRAM

## 2023-08-10 PROCEDURE — 250N000011 HC RX IP 250 OP 636: Performed by: PODIATRIST

## 2023-08-10 PROCEDURE — 99232 SBSQ HOSP IP/OBS MODERATE 35: CPT | Performed by: INTERNAL MEDICINE

## 2023-08-10 RX ORDER — LEVOFLOXACIN 500 MG/1
500 TABLET, FILM COATED ORAL DAILY
Qty: 5 TABLET | Refills: 0
Start: 2023-08-10 | End: 2023-08-15

## 2023-08-10 RX ORDER — ACETAMINOPHEN 325 MG/1
650 TABLET ORAL EVERY 4 HOURS PRN
COMMUNITY
Start: 2023-08-10 | End: 2023-11-30

## 2023-08-10 RX ORDER — POLYETHYLENE GLYCOL 3350 17 G/17G
17 POWDER, FOR SOLUTION ORAL DAILY
COMMUNITY
Start: 2023-08-10 | End: 2023-11-30

## 2023-08-10 RX ORDER — OXYCODONE HYDROCHLORIDE 5 MG/1
5 TABLET ORAL EVERY 6 HOURS PRN
Qty: 15 TABLET | Refills: 0 | Status: SHIPPED | OUTPATIENT
Start: 2023-08-10 | End: 2023-11-30

## 2023-08-10 RX ORDER — LIDOCAINE 4 G/G
1 PATCH TOPICAL EVERY 24 HOURS
DISCHARGE
Start: 2023-08-10 | End: 2023-11-30

## 2023-08-10 RX ORDER — AMOXICILLIN 250 MG
1 CAPSULE ORAL 2 TIMES DAILY
COMMUNITY
Start: 2023-08-10 | End: 2023-11-30

## 2023-08-10 RX ADMIN — COLCHICINE 0.6 MG: 0.6 TABLET ORAL at 08:30

## 2023-08-10 RX ADMIN — LIDOCAINE 1 PATCH: 4 PATCH TOPICAL at 08:34

## 2023-08-10 RX ADMIN — FINASTERIDE 5 MG: 5 TABLET, FILM COATED ORAL at 08:30

## 2023-08-10 RX ADMIN — FLUTICASONE PROPIONATE 2 SPRAY: 50 SPRAY, METERED NASAL at 08:35

## 2023-08-10 RX ADMIN — LISINOPRIL 10 MG: 5 TABLET ORAL at 08:32

## 2023-08-10 RX ADMIN — LORATADINE 10 MG: 10 TABLET ORAL at 08:30

## 2023-08-10 RX ADMIN — ISOSORBIDE MONONITRATE 30 MG: 30 TABLET, EXTENDED RELEASE ORAL at 08:33

## 2023-08-10 RX ADMIN — Medication 81 MG: at 08:30

## 2023-08-10 RX ADMIN — Medication 1 TABLET: at 08:33

## 2023-08-10 RX ADMIN — GABAPENTIN 300 MG: 300 CAPSULE ORAL at 08:30

## 2023-08-10 RX ADMIN — INSULIN ASPART 1 UNITS: 100 INJECTION, SOLUTION INTRAVENOUS; SUBCUTANEOUS at 08:35

## 2023-08-10 RX ADMIN — OXYCODONE HYDROCHLORIDE 10 MG: 5 TABLET ORAL at 08:29

## 2023-08-10 RX ADMIN — FERROUS GLUCONATE 324 MG: 324 TABLET ORAL at 08:30

## 2023-08-10 RX ADMIN — TORSEMIDE 20 MG: 20 TABLET ORAL at 08:30

## 2023-08-10 RX ADMIN — CEFEPIME HYDROCHLORIDE 1 G: 1 INJECTION, POWDER, FOR SOLUTION INTRAMUSCULAR; INTRAVENOUS at 06:05

## 2023-08-10 RX ADMIN — AMLODIPINE BESYLATE 5 MG: 5 TABLET ORAL at 08:30

## 2023-08-10 RX ADMIN — ATORVASTATIN CALCIUM 40 MG: 40 TABLET, FILM COATED ORAL at 08:30

## 2023-08-10 RX ADMIN — LABETALOL HYDROCHLORIDE 200 MG: 200 TABLET ORAL at 08:30

## 2023-08-10 RX ADMIN — TAMSULOSIN HYDROCHLORIDE 0.4 MG: 0.4 CAPSULE ORAL at 08:33

## 2023-08-10 RX ADMIN — SENNOSIDES AND DOCUSATE SODIUM 1 TABLET: 50; 8.6 TABLET ORAL at 08:33

## 2023-08-10 ASSESSMENT — ACTIVITIES OF DAILY LIVING (ADL)
ADLS_ACUITY_SCORE: 29
ADLS_ACUITY_SCORE: 29
ADLS_ACUITY_SCORE: 27

## 2023-08-10 NOTE — PROGRESS NOTES
Podiatry chart check: Patient status post amputation right hallux. Pathology pending. Surgical dressing to remain intact. Continue nonweightbearing right foot. Medical management per hospitalist. Antibiotics per ID, appreciate input. Patient okay for discharge to TCU pending final ID input. I like to see him for follow-up in approximately 7 to 10 days.

## 2023-08-10 NOTE — PLAN OF CARE
PRIMARY DIAGNOSIS: SOFT TISSUE INFECTIONS  OUTPATIENT/OBSERVATION GOALS TO BE MET BEFORE DISCHARGE:  Vitals sign stable or return to baseline: Yes    Tolerating oral antibiotics or has home infusion set up if applicable: No    Pain status: Improved with use of alternative comfort measures i.e.: air mattress for low back pain when in bed.    Return to near baseline physical activity: No    Discharge Planner Nurse   Safe discharge environment identified: Yes - see Care Manager note.  Barriers to discharge: No        Entered by: Margarita Dorman RN 08/09/2023 10:59 PM     Please review provider order for any additional goals.     Nurse to notify provider when observation goals have been met and patient is ready for discharge.

## 2023-08-10 NOTE — PLAN OF CARE
Physical Therapy Discharge Summary    Reason for therapy discharge:    Discharged to transitional care facility.    Progress towards therapy goal(s). See goals on Care Plan in [facility] electronic health record for goal details.  Goals partially met.  Barriers to achieving goals:   discharge from facility.    Therapy recommendation(s):    Recommend continued therapies to improve overall strength, balance and mobility.       Estee Espinoza, PT, DPT  8/10/2023

## 2023-08-10 NOTE — PROGRESS NOTES
Infectious Diseases Progress Note  [facility]    Date of visit: 08/10/2023         ASSESSMENT   78-year-old man with history of coronary disease, hypertension, lipidemia, diabetes who is admitted for right hallux osteomyelitis.    Chronic, nonhealing ulcer on the right hallux.  Followed in podiatry.  Recently on doxycycline, then levofloxacin prior to admission.  Wound culture prior to admission with Citrobacter, Pseudomonas, and Enterococcus.  Right hallux osteomyelitis.  Recent MRI showing osteomyelitis at the distal and proximal phalanx.  Status post amputation in toto on 8/7.  Cultures negative to date. On antibiotics prior to admit.  Pathology pending    Principal Problem:    Osteomyelitis of ankle and foot (H)  Active Problems:    Hypertension    Type 2 diabetes, HbA1c goal < 7% (H)    Hypertrophy of prostate with urinary obstruction    CKD (chronic kidney disease) stage 3, GFR 30-59 ml/min (H)    Chronic systolic congestive heart failure (H)       PLAN   -okay to discharge on levofloxacin 500mg po daily x 5 days  -follow-up with podiatry       Koko Wolfe MD  [facility]  Direct messaging: [employer] Paging  On-Call ID provider: [phone], option: 9      ===========================================    SUBJECTIVE / INTERVAL HISTORY:     No events. Knee and back pain. Planning on discharge today to TCU.       Antibiotics   Cefepime 8/7-  Vancomycin 8/8-    Previous:  Clindamycin 8/7  Levofloxacin prior to admission      Physical Exam     Temp:  [97.7  F (36.5  C)-99.5  F (37.5  C)] 98.7  F (37.1  C)  Pulse:  [79-92] 92  Resp:  [17-18] 18  BP: ()/(51-69) 133/69  SpO2:  [94 %-99 %] 98 %    /69 (BP Location: Right arm)   Pulse 92   Temp 98.7  F (37.1  C) (Oral)   Resp 18   Wt 99.1 kg (218 lb 8 oz)   SpO2 98%   BMI 29.63 kg/m      GENERAL:  well-developed, well-nourished, lying in bed in no acute distress.   HENT:  Head is normocephalic, atraumatic.   EYES:   "Eyes have anicteric sclerae without conjunctival injection   LUNGS:  normal resp pattern  EXT: Lower extremity edema  SKIN:  No acute rashes.  Right foot wrapped.   NEUROLOGIC:  Grossly nonfocal.      Cultures   Previous cultures reviewed and summarized above  8/7 right foot: No organisms on Gram stain; culture no growth to date           Pertinent Labs:     Recent Labs   Lab 08/08/23  0554   WBC 6.6   HGB 9.9*          Recent Labs   Lab 08/08/23  0554      CO2 25   BUN 40.1*       No results for input(s): CRP, SED in the last 168 hours.        Imaging:     POC US Guidance Needle Placement    Result Date: 8/7/2023  Ultrasound was performed as guidance to an anesthesia procedure.  Click \"PACS images\" hyperlink below to view any stored images.  For specific procedure details, view procedure note authored by anesthesia.        Data reviewed today: I reviewed all medications, new labs and imaging results over the last 24 hours. I personally reviewed no images or EKG's today.  The patient's care was discussed with the Patient.  "

## 2023-08-10 NOTE — PLAN OF CARE
Goal Outcome Evaluation:      Problem: Plan of Care - These are the overarching goals to be used throughout the patient stay.    Goal: Optimal Comfort and Wellbeing  Outcome: Adequate for Care Transition     Problem: Plan of Care - These are the overarching goals to be used throughout the patient stay.    Goal: Readiness for Transition of Care  Outcome: Adequate for Care Transition       Patient is pleasant, cooperative, alert and oriented x 4.  Surgical dressing CDI to RLE. Patient remain compliant with NWB status to RLE.  Voiding in urinal.  Pain managed with lidocaine patch and PRN oxycodone. No other concerns reported and will continue to monitor.  Plan to discharge to TCU today around 10:30 am via [facility].

## 2023-08-10 NOTE — PLAN OF CARE
"PRIMARY DIAGNOSIS: \"GENERIC\" NURSING  OUTPATIENT/OBSERVATION GOALS TO BE MET BEFORE DISCHARGE:  ADLs back to baseline: No    Activity and level of assistance: NWB Rt LE    Pain status: Pain free.    Return to near baseline physical activity: No     Discharge Planner Nurse   Safe discharge environment identified: Yes  Barriers to discharge: No       Entered by: Swati Meneses RN 08/10/2023 5:28 AM     Please review provider order for any additional goals.   Nurse to notify provider when observation goals have been met and patient is ready for discharge.  "

## 2023-08-10 NOTE — PROGRESS NOTES
Care Management Follow Up    Length of Stay (days): 1    Expected Discharge Date: 08/10/2023     Concerns to be Addressed: discharge planning       Patient plan of care discussed at interdisciplinary rounds: Yes    Anticipated Discharge Disposition: Transitional Care     Anticipated Discharge Services: therapy services     Anticipated Discharge DME: None    Patient/family educated on Medicare website which has current facility and service quality ratings: yes    Education Provided on the Discharge Plan: Yes    Patient/Family in Agreement with the Plan: yes    Referrals Placed by CM/SW: Post Acute Facilities    Private pay costs discussed: transportation costs    Additional Information: TARA in receipt of [employer] prior authorization for pt for TCU services.  Auth# [id number], from 8/9 to 8/13.      Judith at [facility] accepted, but cannot take pt until Friday.  Brittaney at [facility] accepted and can take pt tomorrow.    TARA met with pt and discussed status of referrals.  Pt would like to go to [facility] tomorrow.  Discussed transportation options and costs - pt okay with [facility] ride costs.  Discussed potential ride time of 11 AM tomorrow - pt in agreement.  Pt also called ANDRADE Mcclainah and updated her while SW in the room.      TARA and Dr Lange discussed pt request for knee scooter with pt.  Therapy cannot provide knee scooter as pt going to TCU.  Dr Lange can do script for one that pt can get from DME supplier and when done at TCU.  Pt indicated understanding.    TARA spoke with Judith at [facility] and declined bed.  TARA spoke with Brittaney and accepted bed for pt for tomorrow.    TARA set up ride via [employer] for 8/10/23, with window of 10:38 AM to 11:23 AM.      TARA updated Dr Lange.    PAS needed.      SANJEEV Jenkins, RONALD 08/09/23 7:40 PM

## 2023-08-10 NOTE — DISCHARGE SUMMARY
[facility]  Hospitalist Discharge Summary      Date of Admission:  8/7/2023  Date of Discharge:  8/10/2023  Discharging Provider: Zeferino Lange MD  Discharge Service: Hospitalist Service    Discharge Diagnoses     Principal Problem:    Osteomyelitis of ankle and foot (H)  Active Problems:    Hypertension    Hypertrophy of prostate with urinary obstruction    Type II diabetes mellitus (H)    Stage 3b chronic kidney disease (H)    Chronic low back pain    Chronic systolic congestive heart failure (H)    Coronary artery disease involving native coronary artery of native heart without angina pectoris    Gout       Clinically Significant Risk Factors     # DMII: A1C = 7.3 % (Ref range: 0.0 - 5.6 %) within past 6 months       Follow-ups Needed After Discharge   Follow-up Appointments     Follow Up and recommended labs and tests      Follow-up with Dr Nakul Salazar in approximately 7 to 10 days          Discharge Disposition   Discharged to short-term care facility  Condition at discharge: Stable    Hospital Course     Lance Ibrahim is a 78 year old male admitted on 8/7/2023. He has history of DM, HTN, CAD, HLD, and anemia. He was admitted after right hallux amputation for osteomyelitis. He was treated with vancomycin and cefepime based on recent wound culture with with Citrobacter, Pseudomonas, and Enterococcus. Intra-op wound culture had no growth. Per ID recommendation, patient continues on levofloxacin 500 mg po daily for 5 days.  Per podiatry (Dr Nakul Salazar): Surgical dressing to remain intact. Nonweightbearing on the right foot at all times. PRAFO boot at all times. Follow-up in approximately 7 to 10 days.     Chronic stable conditions:   Diabetes mellitus type 2: well controlled with metformin. Recent HbA1c 7.3.   Essential hypertension: BP was controlled with PTA lisinopril, labetalol, imdur.   CAD: no anginal chest pain during this admission. On aspirin.   HFrEF: euvolemic during this  admission. Continue PTA torsemide.  CKD, stage IIIb: creatinine at baseline.    BPH: On proscar, tamsulosin  Hyperlipidemia: on PTA atorvastatin   Chronic anemia: PTA ferrous sulfate   Gout: PTA colchicine  Chronic lower back pain: he uses topical lidocaine patch daily.      Consultations This Hospital Stay   PHYSICAL THERAPY ADULT IP CONSULT  HOSPITALIST IP CONSULT  INFECTIOUS DISEASES IP CONSULT  CARE MANAGEMENT / SOCIAL WORK IP CONSULT  PHARMACY TO DOSE VANCO  ORTHOSIS EXTREMITY LOWER REFERRAL IP CONSULT  PHYSICAL THERAPY ADULT IP CONSULT  OCCUPATIONAL THERAPY ADULT IP CONSULT    Code Status   Prior    Time Spent on this Encounter   I, Zeferino Lange MD, personally saw the patient today and spent greater than 30 minutes discharging this patient.       Zeferino Lange MD  [address] [address]  Phone: [phone]  Fax: [phone]  ______________________________________________________________________    Physical Exam   Vital Signs: Temp: 98.7  F (37.1  C) Temp src: Oral BP: 133/69 Pulse: 92   Resp: 18 SpO2: 98 % O2 Device: None (Room air)    Weight: 218 lbs 8 oz     General appearance: not in acute distress  HEENT: PERRL, EOMI  Lungs: Clear breath sounds in bilateral lung fields  Cardiovascular: Regular rate and rhythm, normal S1-S2  Abdomen: Soft, non tender, no distension  Musculoskeletal: No joint swelling. Right foot in surgical dressing.  Skin: No rash and no edema  Neurology: AAO ×3.  Cranial nerves II - XII normal.  Normal muscle strength in all four extremities.        Primary Care Physician   Rickey Adamson    Discharge Orders      General info for SNF    Length of Stay Estimate: Short Term Care: Estimated # of Days <30  Condition at Discharge: Improving  Level of care:skilled   Rehabilitation Potential: Excellent  Admission H&P remains valid and up-to-date: Yes  Recent Chemotherapy: N/A  Use Nursing Home Standing Orders: Yes     Eric  instructions    Give two-step Mantoux (PPD) Per Facility Policy Yes     Follow Up and recommended labs and tests    Follow-up with Dr Nakul Salazar in approximately 7 to 10 days     Reason for your hospital stay    Admitted for post operation care after right hallux amputation.     Activity - Up with nursing assistance     Wound care    Site:   Right foot  Instructions:  Surgical dressing to remain intact until following up with Podiatry in about 7-10 days.     Weight bearing status    Right foot non weightbearing     Physical Therapy Adult Consult    Evaluate and treat as clinically indicated.    Reason: Right big hallux amputation, nonweightbearing of right foot.     Occupational Therapy Adult Consult    Evaluate and treat as clinically indicated.    Reason:  Right big hallux amputation, nonweightbearing of right foot.     Fall precautions     Diet    Follow this diet upon discharge: Advance Diet as Tolerated: Regular Diet Adult       Significant Results and Procedures   Most Recent 3 CBC's:  Recent Labs   Lab Test 08/08/23  0554 07/19/23  0904 04/27/23  1248 04/19/23  1033   WBC 6.6  --  9.6 7.1   HGB 9.9* 10.2* 12.5* 12.5*   MCV 90  --  93 95     --  273 248     Most Recent 3 BMP's:  Recent Labs   Lab Test 08/10/23  0807 08/10/23  0559 08/09/23  2130 08/09/23  1652 08/09/23  0743 08/09/23  0619 08/08/23  0628 08/08/23  0554 08/07/23  2226 04/27/23  1248 04/19/23  1033 08/29/22  0802 08/27/22  1138 08/27/22  0710   NA  --   --   --   --   --   --   --  140  --  139  --   --   --  138   POTASSIUM  --   --   --   --   --   --   --  4.7  --  4.3 5.0  --   --  4.6   CHLORIDE  --   --   --   --   --   --   --  106  --  99  --   --   --  108*   CO2  --   --   --   --   --   --   --  25  --  22  --   --   --  22   BUN  --   --   --   --   --   --   --  40.1*  --  34.0*  --   --   --  16   CR  --  1.53*  --   --   --  1.62*  --  1.87*  --  2.25*  --   --    < > 1.23   ANIONGAP  --   --   --   --   --   --   --  9   --  18*  --   --   --  8   SHANNAN  --   --   --   --   --   --   --  9.5  --  9.6  --   --   --  9.4   *  --  106* 146*   < >  --    < > 121*   < > 142*  --    < >   < > 131*    < > = values in this interval not displayed.       Discharge Medications   Discharge Medication List as of 8/10/2023 10:37 AM        START taking these medications    Details   acetaminophen (TYLENOL) 325 MG tablet Take 2 tablets (650 mg) by mouth every 4 hours as needed for pain (For optimal non-opioid multimodal pain management to improve pain control.), OTC      levofloxacin (LEVAQUIN) 500 MG tablet Take 1 tablet (500 mg) by mouth daily for 5 days, Disp-5 tablet, R-0, No Print Out      oxyCODONE (ROXICODONE) 5 MG tablet Take 1 tablet (5 mg) by mouth every 6 hours as needed for pain, Disp-15 tablet, R-0, Local Print      polyethylene glycol (MIRALAX) 17 g packet Take 17 g by mouth daily, OTC      senna-docusate (SENOKOT-S/PERICOLACE) 8.6-50 MG tablet Take 1 tablet by mouth 2 times daily, OTC           CONTINUE these medications which have CHANGED    Details   Lidocaine (LIDOCARE) 4 % Patch Place 1 patch onto the skin every 24 hours To prevent lidocaine toxicity, patient should be patch free for 12 hrs daily.  Apply to lower back every morning.Transitional           CONTINUE these medications which have NOT CHANGED    Details   amLODIPine (NORVASC) 5 MG tablet Take 1 tablet (5 mg) by mouth daily, Disp-90 tablet, R-3, E-Prescribe      aspirin (ASA) 81 MG EC tablet Take by mouth every 24 hours, Historical      atorvastatin (LIPITOR) 40 MG tablet Take 1 tablet (40 mg) by mouth every 24 hours, Disp-90 tablet, R-3, E-Prescribe      colchicine (COLCYRS) 0.6 MG tablet TAKE 1 TABLET DAILY, Disp-90 tablet, R-3, E-Prescribe      Ferrous Gluconate 240 (27 Fe) MG TABS Take 1 tablet by mouth daily , Historical      finasteride (PROSCAR) 5 MG tablet Take by mouth every 24 hours, Historical      fluticasone (FLONASE) 50 MCG/ACT nasal spray Spray 1-2  "sprays in nostril every 24 hours, Historical      gabapentin (NEURONTIN) 300 MG capsule Take 1 capsule (300 mg) by mouth 3 times daily, Disp-270 capsule, R-0, E-Prescribe      isosorbide mononitrate (IMDUR) 30 MG 24 hr tablet Take 1 tablet (30 mg) by mouth daily, Disp-90 tablet, R-3, E-Prescribe      labetalol (NORMODYNE) 200 MG tablet TAKE 1 TABLET BY MOUTH TWICE A DAY, Disp-180 tablet, R-2, E-Prescribe      lisinopril (ZESTRIL) 10 MG tablet Take 1 tablet (10 mg) by mouth every 24 hours, Disp-90 tablet, R-3, E-Prescribe      loratadine (CLARITIN) 10 MG tablet Take by mouth every 24 hours, Historical      metFORMIN (GLUCOPHAGE) 1000 MG tablet TAKE 1 TABLET TWICE A DAY WITH MEALS, Disp-180 tablet, R-3, E-Prescribe      multivitamin w/minerals (THERA-VIT-M) tablet Take 1 tablet by mouth daily, Historical      Probiotic Product (FORTIFY 30 BILLION PROBIOT 50+) CPDR Take 1 5 Pack by mouth daily, Disp-90 capsule, R-1, E-Prescribe      Semaglutide (RYBELSUS) 7 MG TABS Take 7 mg by mouth daily, Disp-90 tablet, R-3, E-Prescribe      tamsulosin (FLOMAX) 0.4 MG 24 hr capsule Take 1 capsule by mouth daily., Historical      tiZANidine (ZANAFLEX) 2 MG tablet Take 2-4 mg by mouth every 6 hours as needed for muscle spasms Not to exceed 3 doses/24 hours, Historical      torsemide (DEMADEX) 20 MG tablet Take 1 tablet (20 mg) by mouth daily, Disp-90 tablet, R-2, E-Prescribe      blood glucose monitoring (NO BRAND SPECIFIED) meter device kit Use to test blood sugar 2 times daily or as directed.Disp-1 kit, [id number]      CONTOUR NEXT TEST test strip USE TO TEST BLOOD SUGAR 2 TIMES DAILY OR AS DIRECTED., Disp-100 strip, R-0, E-Prescribe      Microlet Lancets MISC USE TO TEST BLOOD SUGAR 2 TIMES DAILY OR AS DIRECTED., Disp-100 each, R-1, E-Prescribe           Allergies   Allergies   Allergen Reactions    Penicillins Anaphylaxis     8/25/22 - tolerated cefepime     Sulfa Antibiotics      \"itchy rash, swelling of face and hands\"    " Ancef [Cefazolin] Rash

## 2023-08-11 ENCOUNTER — PATIENT OUTREACH (OUTPATIENT)
Dept: CARE COORDINATION | Facility: CLINIC | Age: 78
End: 2023-08-11
Payer: COMMERCIAL

## 2023-08-11 PROCEDURE — 88305 TISSUE EXAM BY PATHOLOGIST: CPT | Mod: 26 | Performed by: PATHOLOGY

## 2023-08-11 PROCEDURE — 88311 DECALCIFY TISSUE: CPT | Mod: 26 | Performed by: PATHOLOGY

## 2023-08-11 PROCEDURE — 88312 SPECIAL STAINS GROUP 1: CPT | Mod: 26 | Performed by: PATHOLOGY

## 2023-08-11 NOTE — PROGRESS NOTES
[facility] [place]    Background: Transitional Care Management program identified per system criteria and reviewed by [facility] [place] team for possible outreach.    Assessment: Upon chart review, CCR Team member will not proceed with patient outreach related to this episode of Transitional Care Management program due to reason below:    [facility] TCU: Patient discharged to TCU/ARU/LTACH and is established within [facility] Primary Care. Referral created for Primary Care-Care Coordination program.    Plan: Transitional Care Management episode addressed appropriately per reason noted above.      Maria Fernanda Madsen RN  [facility], [facility]    *Connected Care Resource Team does NOT follow patient ongoing. Referrals are identified based on internal discharge reports and the outreach is to ensure patient has an understanding of their discharge instructions.

## 2023-08-11 NOTE — LETTER
[facility]       To:              Please give to facility      From:   Bonnie Kilpatrick [facility]  Care Coordinator   [facility]   P: [phone]  [email or web address]        Patient Name:      Lance Ibrahim  YOB: 1945   Admit date:   08/10/2023        Information Needed:  Please contact me when the patient will discharge (or if they will move to long term care)- include the discharge date, disposition, and main diagnosis       If the patient is discharged with home care services, please provide the name of the agency    Also- Please inform me if a care conference is being held.     Phone or Email with information                              Thank you

## 2023-08-11 NOTE — PROGRESS NOTES
Clinic Care Coordination Contact  Care Coordination Transition Communication         Clinical Data: Patient was hospitalized at [facility] from 08/07/2023 to 08/10/2023 with diagnosis of  right hallux amputation for osteomyelitis .     Transition to Facility:              Facility Name:[facility]               Contact name and phone number/fax: [phone]    Plan: RN/SW Care Coordinator will await notification from facility staff informing RN/SW Care Coordinator of patient's discharge plans/needs. RN/SW Care Coordinator will review chart and outreach to facility staff every 4 weeks and as needed.     Hunter Clark, [facility]

## 2023-08-14 ENCOUNTER — LAB REQUISITION (OUTPATIENT)
Dept: LAB | Facility: CLINIC | Age: 78
End: 2023-08-14
Payer: COMMERCIAL

## 2023-08-14 DIAGNOSIS — M86.171 OTHER ACUTE OSTEOMYELITIS, RIGHT ANKLE AND FOOT (H): ICD-10-CM

## 2023-08-14 LAB — BACTERIA SPEC CULT: NORMAL

## 2023-08-14 PROCEDURE — 87493 C DIFF AMPLIFIED PROBE: CPT | Performed by: NURSE PRACTITIONER

## 2023-08-15 ENCOUNTER — LAB REQUISITION (OUTPATIENT)
Dept: LAB | Facility: CLINIC | Age: 78
End: 2023-08-15
Payer: COMMERCIAL

## 2023-08-15 ENCOUNTER — OFFICE VISIT (OUTPATIENT)
Dept: VASCULAR SURGERY | Facility: CLINIC | Age: 78
End: 2023-08-15
Attending: PODIATRIST
Payer: COMMERCIAL

## 2023-08-15 VITALS
DIASTOLIC BLOOD PRESSURE: 50 MMHG | RESPIRATION RATE: 18 BRPM | TEMPERATURE: 98.3 F | SYSTOLIC BLOOD PRESSURE: 84 MMHG | HEART RATE: 80 BPM

## 2023-08-15 DIAGNOSIS — M86.9 OSTEOMYELITIS OF ANKLE AND FOOT (H): Primary | ICD-10-CM

## 2023-08-15 LAB
ANION GAP SERPL CALCULATED.3IONS-SCNC: 15 MMOL/L (ref 7–15)
BUN SERPL-MCNC: 50.6 MG/DL (ref 8–23)
C DIFF TOX B STL QL: NEGATIVE
CALCIUM SERPL-MCNC: 9.7 MG/DL (ref 8.8–10.2)
CHLORIDE SERPL-SCNC: 102 MMOL/L (ref 98–107)
CREAT SERPL-MCNC: 2.06 MG/DL (ref 0.67–1.17)
DEPRECATED HCO3 PLAS-SCNC: 20 MMOL/L (ref 22–29)
ERYTHROCYTE [DISTWIDTH] IN BLOOD BY AUTOMATED COUNT: 17 % (ref 10–15)
GFR SERPL CREATININE-BSD FRML MDRD: 32 ML/MIN/1.73M2
GLUCOSE SERPL-MCNC: 104 MG/DL (ref 70–99)
HCT VFR BLD AUTO: 35.7 % (ref 40–53)
HGB BLD-MCNC: 10.5 G/DL (ref 13.3–17.7)
MCH RBC QN AUTO: 26.9 PG (ref 26.5–33)
MCHC RBC AUTO-ENTMCNC: 29.4 G/DL (ref 31.5–36.5)
MCV RBC AUTO: 91 FL (ref 78–100)
PLATELET # BLD AUTO: 335 10E3/UL (ref 150–450)
POTASSIUM SERPL-SCNC: 4.3 MMOL/L (ref 3.4–5.3)
RBC # BLD AUTO: 3.91 10E6/UL (ref 4.4–5.9)
SODIUM SERPL-SCNC: 137 MMOL/L (ref 136–145)
WBC # BLD AUTO: 9.3 10E3/UL (ref 4–11)

## 2023-08-15 PROCEDURE — 80048 BASIC METABOLIC PNL TOTAL CA: CPT | Performed by: NURSE PRACTITIONER

## 2023-08-15 PROCEDURE — 36415 COLL VENOUS BLD VENIPUNCTURE: CPT | Performed by: NURSE PRACTITIONER

## 2023-08-15 PROCEDURE — G0463 HOSPITAL OUTPT CLINIC VISIT: HCPCS | Performed by: PODIATRIST

## 2023-08-15 PROCEDURE — 85027 COMPLETE CBC AUTOMATED: CPT | Performed by: NURSE PRACTITIONER

## 2023-08-15 PROCEDURE — 99212 OFFICE O/P EST SF 10 MIN: CPT | Performed by: PODIATRIST

## 2023-08-15 PROCEDURE — P9604 ONE-WAY ALLOW PRORATED TRIP: HCPCS | Performed by: NURSE PRACTITIONER

## 2023-08-15 ASSESSMENT — PAIN SCALES - GENERAL: PAINLEVEL: NO PAIN (0)

## 2023-08-15 NOTE — PATIENT INSTRUCTIONS
Important lnstructions      WEIGHT BEARING STATUS: You are to remain NON WEIGHT BEARING on your right foot. NON WEIGHT BEARING MEANS NO PRESSURE ON YOUR FOOT OR HEEL AT ANY TIME FOR ANY REASON!    2. OFFLOADING DEVICE: Must use a A WHEELCHAIR at all times! (do not use affected foot to push wheelchair)    3. STABILIZATION DEVICE: Use a PRAFO BOOT . You will need to WEAR THIS AT ALL TIMES EVEN WHILE IN BED.     4. ELEVATE: Elevating your leg means laying with your head on a pillow and your foot ABOVE YOUR WAIST.     5. DO NOT MOVE YOUR FOOT.  There is a risk of worsening the wound or incision. To give yourself a higher chance of healing, please DO NOT swing foot back and forth and wiggle foot/toes especially when inside a stabilization device.        Dressing Change lnstructions          LEAVE DRESSINGS INTACT UNTIL YOUR NEXT APPOINTMENT    It IS NOT ok to get your wound wet in the bath or shower    SEEK MEDICAL CARE IF:  You have an increase in swelling, pain, or redness around the wound.  You have an increase in the amount of pus coming from the wound.  There is a bad smell coming from the wound.  The wound appears to be worsening/enlarging  You have a fever greater than 101.5 F      It is ok to continue current wound care treatment/products for the next 2-3 days until new wound care supplies are ordered and arrive. If longer than this please contact our office at [phone].        We want to hear from you!   In the next few weeks, you should receive a call or email to complete a survey about your visit at [facility]. Please help us improve your appointment experience by letting us know how we did today. We strive to make your experience good and value any ways in which we could do better.      We value your input and suggestions.    Thank you for choosing the [facility]!

## 2023-08-15 NOTE — PROGRESS NOTES
"Podiatry Progress Note        ASSESSMENT: S/P amputation right hallux      TREATMENT:  -Surgical site on the right foot is progressing well. Clean proximal margin per pathology report.     -I discussed the importance of nonweightbearing on the right foot at all times and risks of dehiscence with continued weightbearing on the right foot.  Recommend use of PRAFO boot at all times.  Cam boot with limited weightbearing.    -Gauze dressing applied today which he will keep intact. Continue non-weight bearing on the right foot.     -He will follow-up with me in 2 weeks for suture removal.     Nakul Salazar DPM  [facility] Podiatry/Foot & Ankle Surgery      HPI: Lance Ibrahim was seen again today s/p amputation right hallux.  Patient is currently at TCU but scheduled to discharge later this week.  He is remained nonweightbearing on his right foot.    Past Medical History:   Diagnosis Date    Anemia     Arthritis     osteoarthritis knees    BPH     CAD (coronary artery disease)     subtotal occlusion in the small distal LAD     Cardiomyopathy     Cardiomyopathy (H)     Cerebral artery occlusion with cerebral infarction     TIA 1993, no residual    Cervicalgia     Chronic kidney disease     Chronic low back pain     Chronic rhinitis     Chronic rhinitis     Gouty arthropathy     HTN (hypertension)     Hyperlipidemia     Hypertension     Kidney stone     Nocturia     Obesity     PVD (peripheral vascular disease)     Sleep apnea     doesn't use cpap    TIA (transient ischaemic attack) 1993    Type 2 diabetes mellitus - 11/08/2018    Ureteral stone        Allergies   Allergen Reactions    Penicillins Anaphylaxis     8/25/22 - tolerated cefepime     Sulfa Antibiotics      \"itchy rash, swelling of face and hands\"    Ancef [Cefazolin] Rash         Current Outpatient Medications:     acetaminophen (TYLENOL) 325 MG tablet, Take 2 tablets (650 mg) by mouth every 4 hours as needed for pain (For optimal non-opioid multimodal " pain management to improve pain control.), Disp: , Rfl:     amLODIPine (NORVASC) 5 MG tablet, Take 1 tablet (5 mg) by mouth daily, Disp: 90 tablet, Rfl: 3    aspirin (ASA) 81 MG EC tablet, Take by mouth every 24 hours, Disp: , Rfl:     atorvastatin (LIPITOR) 40 MG tablet, Take 1 tablet (40 mg) by mouth every 24 hours, Disp: 90 tablet, Rfl: 3    blood glucose monitoring (NO BRAND SPECIFIED) meter device kit, Use to test blood sugar 2 times daily or as directed., Disp: 1 kit, Rfl: 0    colchicine (COLCYRS) 0.6 MG tablet, TAKE 1 TABLET DAILY, Disp: 90 tablet, Rfl: 3    CONTOUR NEXT TEST test strip, USE TO TEST BLOOD SUGAR 2 TIMES DAILY OR AS DIRECTED., Disp: 100 strip, Rfl: 0    Ferrous Gluconate 240 (27 Fe) MG TABS, Take 1 tablet by mouth daily , Disp: , Rfl:     finasteride (PROSCAR) 5 MG tablet, Take by mouth every 24 hours, Disp: , Rfl:     fluticasone (FLONASE) 50 MCG/ACT nasal spray, Spray 1-2 sprays in nostril every 24 hours, Disp: , Rfl:     gabapentin (NEURONTIN) 300 MG capsule, Take 1 capsule (300 mg) by mouth 3 times daily, Disp: 270 capsule, Rfl: 0    isosorbide mononitrate (IMDUR) 30 MG 24 hr tablet, Take 1 tablet (30 mg) by mouth daily, Disp: 90 tablet, Rfl: 3    labetalol (NORMODYNE) 200 MG tablet, TAKE 1 TABLET BY MOUTH TWICE A DAY, Disp: 180 tablet, Rfl: 2    levofloxacin (LEVAQUIN) 500 MG tablet, Take 1 tablet (500 mg) by mouth daily for 5 days, Disp: 5 tablet, Rfl: 0    Lidocaine (LIDOCARE) 4 % Patch, Place 1 patch onto the skin every 24 hours To prevent lidocaine toxicity, patient should be patch free for 12 hrs daily.  Apply to lower back every morning., Disp: , Rfl:     lisinopril (ZESTRIL) 10 MG tablet, Take 1 tablet (10 mg) by mouth every 24 hours, Disp: 90 tablet, Rfl: 3    loratadine (CLARITIN) 10 MG tablet, Take by mouth every 24 hours, Disp: , Rfl:     metFORMIN (GLUCOPHAGE) 1000 MG tablet, TAKE 1 TABLET TWICE A DAY WITH MEALS, Disp: 180 tablet, Rfl: 3    Microlet Lancets MISC, USE TO TEST  BLOOD SUGAR 2 TIMES DAILY OR AS DIRECTED., Disp: 100 each, Rfl: 1    multivitamin w/minerals (THERA-VIT-M) tablet, Take 1 tablet by mouth daily, Disp: , Rfl:     oxyCODONE (ROXICODONE) 5 MG tablet, Take 1 tablet (5 mg) by mouth every 6 hours as needed for pain, Disp: 15 tablet, Rfl: 0    polyethylene glycol (MIRALAX) 17 g packet, Take 17 g by mouth daily, Disp: , Rfl:     Probiotic Product (FORTIFY 30 BILLION PROBIOT 50+) CPDR, Take 1 5 Pack by mouth daily, Disp: 90 capsule, Rfl: 1    Semaglutide (RYBELSUS) 7 MG TABS, Take 7 mg by mouth daily, Disp: 90 tablet, Rfl: 3    senna-docusate (SENOKOT-S/PERICOLACE) 8.6-50 MG tablet, Take 1 tablet by mouth 2 times daily, Disp: , Rfl:     tamsulosin (FLOMAX) 0.4 MG 24 hr capsule, Take 1 capsule by mouth daily., Disp: , Rfl:     tiZANidine (ZANAFLEX) 2 MG tablet, Take 2-4 mg by mouth every 6 hours as needed for muscle spasms Not to exceed 3 doses/24 hours, Disp: , Rfl:     torsemide (DEMADEX) 20 MG tablet, Take 1 tablet (20 mg) by mouth daily, Disp: 90 tablet, Rfl: 2    Review of Systems - Negative       OBJECTIVE:  Appearance: alert, well appearing, and in no distress.    BP (!) 84/50   Pulse 80   Temp 98.3  F (36.8  C)   Resp 18     @LDAVASC(10,16,17)@    No images are attached to the encounter.     Surgical site on the medial right foot at amputated right hallux has intact sutures with skin edges well approximated, no gapping noted. No erythema right foot. Neurovascular status unchanged right foot.

## 2023-08-16 ENCOUNTER — LAB REQUISITION (OUTPATIENT)
Dept: LAB | Facility: CLINIC | Age: 78
End: 2023-08-16
Payer: COMMERCIAL

## 2023-08-16 DIAGNOSIS — Z47.81 ENCOUNTER FOR ORTHOPEDIC AFTERCARE FOLLOWING SURGICAL AMPUTATION: ICD-10-CM

## 2023-08-16 LAB
PATH REPORT.ADDENDUM SPEC: NORMAL
PATH REPORT.COMMENTS IMP SPEC: NORMAL
PATH REPORT.FINAL DX SPEC: NORMAL
PATH REPORT.GROSS SPEC: NORMAL
PATH REPORT.MICROSCOPIC SPEC OTHER STN: NORMAL
PATH REPORT.RELEVANT HX SPEC: NORMAL
PHOTO IMAGE: NORMAL

## 2023-08-17 LAB
ANION GAP SERPL CALCULATED.3IONS-SCNC: 15 MMOL/L (ref 7–15)
BUN SERPL-MCNC: 46.6 MG/DL (ref 8–23)
CALCIUM SERPL-MCNC: 9.7 MG/DL (ref 8.8–10.2)
CHLORIDE SERPL-SCNC: 104 MMOL/L (ref 98–107)
CREAT SERPL-MCNC: 1.65 MG/DL (ref 0.67–1.17)
DEPRECATED HCO3 PLAS-SCNC: 22 MMOL/L (ref 22–29)
GFR SERPL CREATININE-BSD FRML MDRD: 42 ML/MIN/1.73M2
GLUCOSE SERPL-MCNC: 124 MG/DL (ref 70–99)
POTASSIUM SERPL-SCNC: 4.5 MMOL/L (ref 3.4–5.3)
SODIUM SERPL-SCNC: 141 MMOL/L (ref 136–145)

## 2023-08-17 PROCEDURE — 80048 BASIC METABOLIC PNL TOTAL CA: CPT | Performed by: INTERNAL MEDICINE

## 2023-08-17 PROCEDURE — P9603 ONE-WAY ALLOW PRORATED MILES: HCPCS | Performed by: INTERNAL MEDICINE

## 2023-08-17 PROCEDURE — 36415 COLL VENOUS BLD VENIPUNCTURE: CPT | Performed by: INTERNAL MEDICINE

## 2023-08-22 DIAGNOSIS — E78.5 DYSLIPIDEMIA: ICD-10-CM

## 2023-08-23 RX ORDER — ATORVASTATIN CALCIUM 40 MG/1
TABLET, FILM COATED ORAL
Qty: 90 TABLET | Refills: 0 | Status: SHIPPED | OUTPATIENT
Start: 2023-08-23 | End: 2023-11-21

## 2023-08-24 ENCOUNTER — TELEPHONE (OUTPATIENT)
Dept: FAMILY MEDICINE | Facility: CLINIC | Age: 78
End: 2023-08-24
Payer: COMMERCIAL

## 2023-08-24 NOTE — TELEPHONE ENCOUNTER
Home Care is calling regarding an established patient with [facility].        8/24/2023     4:07 PM   Home Care Information   Date of Home Care episode start 8/24/2023   Current following provider Needs to confirm    Name/Phone Number NISA Valladares Case Manager, [phone]   Home Care agency [facility]     Requesting orders from: Rickey Adamson  Provider is following patient: No       Orders Requested    Physical Therapy  Request for initial certification (first set of orders)   Frequency:  PT once every other week for 3 visits then will reassess as pt is currently non-weight bearing.    Patient was at a TCU and PT start of care (Eval) was completed today.     No additional orders request.    Information was gathered and will be sent to provider for review.  RN will contact Home Care with information after provider review.  Confirmed ok to leave a detailed message with call back.  Contact information confirmed and updated as needed.    Jeb Silver, PASTORAN, RN  [facility]

## 2023-08-25 ENCOUNTER — TELEPHONE (OUTPATIENT)
Dept: FAMILY MEDICINE | Facility: CLINIC | Age: 78
End: 2023-08-25
Payer: COMMERCIAL

## 2023-08-25 NOTE — TELEPHONE ENCOUNTER
Home Care is calling regarding an established patient with  [facility].        8/24/2023     4:07 PM   Home Care Information   Date of Home Care episode start 8/24/2023   Current following provider Needs to confirm    Name/Phone Number NISA Valladares Case Manager, [phone]   Home Care agency [facility]     Requesting orders from: Rickey Adamson  Provider is following patient: No       Orders Requested    Skilled Nursing  Request for initial certification (first set of orders)   Frequency:  1x/week for 3 weeks, 0x/week for 1 weeks, 1x/week for 2 weeks effective week of 8/28/23      Physical Therapy  Request for initial evaluation and treatment (one time) (first set of orders)   Frequency:  Eval and tx for week of 8/24/23      Occupational Therapy  Request for initial evaluation and treatment (one time) (first set of orders)   Frequency:  Eval & tx for week of 8/24/23      Information was gathered and will be sent to provider for review.  RN will contact Home Care with information after provider review.  Information was gathered and will be sent to provider to confirm provider will be following patient.  RN will contact Home Care with information after provider review.  Confirmed ok to leave a detailed message with call back.  Contact information confirmed and updated as needed.    Ilene Guillen RN

## 2023-08-25 NOTE — TELEPHONE ENCOUNTER
[facility], OT   [phone]      Home Care is calling regarding an established patient with [facility].        8/24/2023     4:07 PM   Home Care Information   Date of Home Care episode start 8/24/2023   Current following provider Needs to confirm    Name/Phone Number NISA Valladares Case Manager, [phone]   Home Care agency [facility]     Requesting orders from: Rickey Adamson  Provider is following patient: Yes  Is this a 60-day recertification request?  No    Orders Requested    Occupational Therapy  Request for initial evaluation and treatment (one time)     Eval and Treat order needed again for 9/11/23 d/t pt currently none weight bearing today. They will need to eval pt on 9/11/23 and go from there with a plan of care.    Verbal orders given.  Home Care will send orders for provider to sign.  Confirmed ok to leave a detailed message with call back.  Contact information confirmed and updated as needed.    Jeb Silver RN

## 2023-08-25 NOTE — TELEPHONE ENCOUNTER
Blaine is calling back asking if orders were approved yet and writer told her that the provider is not in on Fridays and she states is there another provider that can sign off on these orders they are needed for today 08/25/2023. Please advise and call Blaine back please and thank you.

## 2023-08-25 NOTE — TELEPHONE ENCOUNTER
Informed NISA Valladares of info and she verbalized understanding. No other concerns at this time.    PASTORA SolN, RN  [facility]

## 2023-08-25 NOTE — TELEPHONE ENCOUNTER
Will route to covering provider for verbal order. Please see Telephone call from yesterday regarding confirmation if Dr Adamson will be willing to follow and provide home care order and he stated yes.    Nurse unable to approve initial set of orders per home care protocol. Will route to covering provider to review and advise.    PASTORA SolN, RN  [facility]

## 2023-08-29 ENCOUNTER — OFFICE VISIT (OUTPATIENT)
Dept: VASCULAR SURGERY | Facility: CLINIC | Age: 78
End: 2023-08-29
Attending: PODIATRIST
Payer: COMMERCIAL

## 2023-08-29 VITALS — OXYGEN SATURATION: 97 % | HEART RATE: 104 BPM | SYSTOLIC BLOOD PRESSURE: 118 MMHG | DIASTOLIC BLOOD PRESSURE: 76 MMHG

## 2023-08-29 DIAGNOSIS — E11.42 DIABETIC POLYNEUROPATHY ASSOCIATED WITH TYPE 2 DIABETES MELLITUS (H): ICD-10-CM

## 2023-08-29 DIAGNOSIS — M86.9 OSTEOMYELITIS OF ANKLE AND FOOT (H): Primary | ICD-10-CM

## 2023-08-29 PROCEDURE — 99212 OFFICE O/P EST SF 10 MIN: CPT | Performed by: PODIATRIST

## 2023-08-29 PROCEDURE — G0463 HOSPITAL OUTPT CLINIC VISIT: HCPCS | Performed by: PODIATRIST

## 2023-08-29 ASSESSMENT — PAIN SCALES - GENERAL: PAINLEVEL: NO PAIN (0)

## 2023-08-29 NOTE — PATIENT INSTRUCTIONS
Congratulations! Your wound has healed.    For the next 2 weeks Dr. Salazar would like you to remain NON WEIGHT BEARING MEANS NO PRESSURE ON YOUR FOOT OR HEEL AT ANY TIME FOR ANY REASON! on your right foot with the use of your CAM BOOT, to allow the newly healed skin to become stronger.    You can begin walking in your CAM boot 9/12/23. Use your CAM boot until you get your new diabetic shoes/inserts      You may begin showering as normal in 2 weeks      Continue to monitor the area for breakdown & call us if your wound reopens.    Allow the steri strips to fall off on their own, do not pull these off.     We want to hear from you!   In the next few weeks, you should receive a call or email to complete a survey about your visit at [facility]. Please help us improve your appointment experience by letting us know how we did today. We strive to make your experience good and value any ways in which we could do better.      We value your input and suggestions.    Thank you for choosing the [facility]!    [place] CUSTOM FOOT ORTHOTICS LOCATIONS  [facility]  [address]  [place] [address]  Phone: [phone]  Fax: [phone] [facility]  [address] [place] [address]  Home Medical Equipment, [address] Phone: [phone]  Orthotics and Prosthetics, [address]  Phone [phone] [address]  [place] [address]  Phone: [phone]   Fax: [phone]   [facility]  [address]  [place] [address]  Phone: [phone]  Fax: [phone] [facility]   Home Medical Equipment   [address], [place] [address]  Phone :[phone]  Orthotics and Prosthetics  [address] [facility] [address], [address], [facility] [address]  Phone:[phone]   [facility]  [address]  [place] [address]  Phone: [phone]   Fax: [phone]   [facility]   [address]  [place] [address]  Phone: [phone]  Fax: [phone] [facility]   [address]  [place] [address]  Phone: [phone] [place]  [address] [place] [address]  Phone :[phone]             WEARING YOUR CUSTOM FOOT ORTHOTICS   Most insurance plans cover one pair of orthotics per year. You must check with your   insurance plan to see what your payment responsibility will be. Please call your   insurance company by calling the number on the back of your insurance card.   Orthotic's are non-refundable and non-returnable.   Orthotics are made of various designs. Some orthotics are covered with material that extends beyond your toes. If your orthotic is of this design, you will likely need to trim the toe end to get a proper fit. The insole from your shoe can be used as a template. Simply overlay the shoe insert on top of the custom orthotic. Align the heel end while tracing the length of the insert onto the custom orthotic. Use a large scissor to trim the toe end until you get a proper fit in the shoe.   The orthotic needs to be pushed as far back in the shoe as possible. The heel portion should not ride forward so as not to irritate your heel.   Orthotics are designed to work with socks. Excessive perspiration will shorten the life span of the orthotics. Remove the orthotic from the shoe frequently for proper drying.   The break-in period lasts for weeks. People new to orthotics will likely experience new aches and pains. The orthotic is forcing your foot into a new position. Arch, foot and leg muscle aches and fatigue are common during these weeks. Minor discomfort can be considered normal break in phenomenon. Start wearing your orthotic around your home your first day. Limited activity for one to two hours is  recommended. You can increase one or two additional hours each day provided the aches and pains are subsiding. The degree of discomfort, fatigue and problems will dictate the speed of break in. You may require multiple weeks to work up to full time use.   Do not continue wearing your orthotics if they are creating problems such as blisters or sores. Do not hesitate to call the clinic to speak with a nurse regarding orthotic   break in, fit, trimming, etc. You may also need to see the doctor if the orthotics are   simply not working out. Adjustments are sometimes made to improve orthotic   function.     Orthotics will only work in certain styles and types of shoes. Orthotics rarely work in dress shoes. Slip-ons, clogs, sandals and heels are particularly troublesome. Specially designed orthotics may be necessary for these types of shoes. Your custom orthotic was designed for activities that require appropriate walking or running shoes. Lace up athletic shoes, walking shoes or work boots should work appropriately. You may need a wider or longer shoe. Shoes with a removable  or insert work best. In general, you want to remove an insert from the shoe before placing the orthotic into the shoe. Shoes without a removable liner may not work as well.     When purchasing new shoes, bring your orthotics along to get a proper fit. Shop at stores that are familiar with orthotics.   Frequent washing of the orthotic may shorten the life span of the top cover. The top cover can be replaced but will generally last one to five years depending on use and foot perspiration.

## 2023-08-29 NOTE — PROGRESS NOTES
"Podiatry Progress Note        ASSESSMENT:   S/P amputation right hallux  DM2      TREATMENT:  -Surgical site on the right foot is progressing well.  Sutures removed today, Steri-Strips applied.    -Referred to [facility] orthotics and prosthetics for diabetic shoes and inserts with toe filler on the right foot.    -I recommend he remain nonweightbearing for additional 2 weeks, then limited walking in cam boot until diabetic shoes and inserts are available.    -I have asked him to closely monitor for any skin irritation or skin breakdown and return to see me should this occur.    -All questions invited and answered.  He is discharged from my care at this time but encouraged to follow-up with any problems questions or concerns as they develop.    Nakul Salazar DPM  [facility] Podiatry/Foot & Ankle Surgery      HPI: Lance Ibrahim was seen again today s/p amputation right hallux.  Patient is currently at TCU but scheduled to discharge later this week.  He is remained nonweightbearing on his right foot.    Past Medical History:   Diagnosis Date    Anemia     Arthritis     osteoarthritis knees    BPH     CAD (coronary artery disease)     subtotal occlusion in the small distal LAD     Cardiomyopathy     Cardiomyopathy (H)     Cerebral artery occlusion with cerebral infarction     TIA 1993, no residual    Cervicalgia     Chronic kidney disease     Chronic low back pain     Chronic rhinitis     Chronic rhinitis     Gouty arthropathy     HTN (hypertension)     Hyperlipidemia     Hypertension     Kidney stone     Nocturia     Obesity     PVD (peripheral vascular disease)     Sleep apnea     doesn't use cpap    TIA (transient ischaemic attack) 1993    Type 2 diabetes mellitus - 11/08/2018    Ureteral stone        Allergies   Allergen Reactions    Penicillins Anaphylaxis     8/25/22 - tolerated cefepime     Sulfa Antibiotics      \"itchy rash, swelling of face and hands\"    Ancef [Cefazolin] Rash         Current " Outpatient Medications:     acetaminophen (TYLENOL) 325 MG tablet, Take 2 tablets (650 mg) by mouth every 4 hours as needed for pain (For optimal non-opioid multimodal pain management to improve pain control.), Disp: , Rfl:     amLODIPine (NORVASC) 5 MG tablet, Take 1 tablet (5 mg) by mouth daily, Disp: 90 tablet, Rfl: 3    aspirin (ASA) 81 MG EC tablet, Take by mouth every 24 hours, Disp: , Rfl:     atorvastatin (LIPITOR) 40 MG tablet, TAKE 1 TABLET EVERY 24 HOURS, Disp: 90 tablet, Rfl: 0    blood glucose monitoring (NO BRAND SPECIFIED) meter device kit, Use to test blood sugar 2 times daily or as directed., Disp: 1 kit, Rfl: 0    colchicine (COLCYRS) 0.6 MG tablet, TAKE 1 TABLET DAILY, Disp: 90 tablet, Rfl: 3    CONTOUR NEXT TEST test strip, USE TO TEST BLOOD SUGAR 2 TIMES DAILY OR AS DIRECTED., Disp: 100 strip, Rfl: 0    Ferrous Gluconate 240 (27 Fe) MG TABS, Take 1 tablet by mouth daily , Disp: , Rfl:     finasteride (PROSCAR) 5 MG tablet, Take by mouth every 24 hours, Disp: , Rfl:     fluticasone (FLONASE) 50 MCG/ACT nasal spray, Spray 1-2 sprays in nostril every 24 hours, Disp: , Rfl:     gabapentin (NEURONTIN) 300 MG capsule, Take 1 capsule (300 mg) by mouth 3 times daily, Disp: 270 capsule, Rfl: 0    isosorbide mononitrate (IMDUR) 30 MG 24 hr tablet, Take 1 tablet (30 mg) by mouth daily, Disp: 90 tablet, Rfl: 3    labetalol (NORMODYNE) 200 MG tablet, TAKE 1 TABLET BY MOUTH TWICE A DAY, Disp: 180 tablet, Rfl: 2    Lidocaine (LIDOCARE) 4 % Patch, Place 1 patch onto the skin every 24 hours To prevent lidocaine toxicity, patient should be patch free for 12 hrs daily.  Apply to lower back every morning., Disp: , Rfl:     lisinopril (ZESTRIL) 10 MG tablet, Take 1 tablet (10 mg) by mouth every 24 hours, Disp: 90 tablet, Rfl: 3    loratadine (CLARITIN) 10 MG tablet, Take by mouth every 24 hours, Disp: , Rfl:     metFORMIN (GLUCOPHAGE) 1000 MG tablet, TAKE 1 TABLET TWICE A DAY WITH MEALS, Disp: 180 tablet, Rfl: 3     Microlet Lancets MISC, USE TO TEST BLOOD SUGAR 2 TIMES DAILY OR AS DIRECTED., Disp: 100 each, Rfl: 1    multivitamin w/minerals (THERA-VIT-M) tablet, Take 1 tablet by mouth daily, Disp: , Rfl:     polyethylene glycol (MIRALAX) 17 g packet, Take 17 g by mouth daily, Disp: , Rfl:     Probiotic Product (FORTIFY 30 BILLION PROBIOT 50+) CPDR, Take 1 5 Pack by mouth daily, Disp: 90 capsule, Rfl: 1    Semaglutide (RYBELSUS) 7 MG TABS, Take 7 mg by mouth daily, Disp: 90 tablet, Rfl: 3    senna-docusate (SENOKOT-S/PERICOLACE) 8.6-50 MG tablet, Take 1 tablet by mouth 2 times daily, Disp: , Rfl:     tamsulosin (FLOMAX) 0.4 MG 24 hr capsule, Take 1 capsule by mouth daily., Disp: , Rfl:     tiZANidine (ZANAFLEX) 2 MG tablet, Take 2-4 mg by mouth every 6 hours as needed for muscle spasms Not to exceed 3 doses/24 hours, Disp: , Rfl:     torsemide (DEMADEX) 20 MG tablet, Take 1 tablet (20 mg) by mouth daily, Disp: 90 tablet, Rfl: 2    oxyCODONE (ROXICODONE) 5 MG tablet, Take 1 tablet (5 mg) by mouth every 6 hours as needed for pain, Disp: 15 tablet, Rfl: 0    Review of Systems - Negative       OBJECTIVE:  Appearance: alert, well appearing, and in no distress.    /76   Pulse 104   SpO2 97%     No images are attached to the encounter.     Surgical site on the medial right foot at amputated right hallux has intact sutures with skin edges well coapted, no gapping noted. No erythema right foot. Neurovascular status unchanged right foot.

## 2023-09-06 ENCOUNTER — TELEPHONE (OUTPATIENT)
Dept: FAMILY MEDICINE | Facility: CLINIC | Age: 78
End: 2023-09-06
Payer: COMMERCIAL

## 2023-09-06 NOTE — TELEPHONE ENCOUNTER
Home Care is calling regarding an established patient with  [employer] [facility] [place].        8/24/2023     4:07 PM   Home Care Information   Date of Home Care episode start 8/24/2023   Current following provider Needs to confirm    Name/Phone Number NISA Valladares Case Manager, [phone]   Home Care agency [facility]     Requesting orders from: Rickey Adamson  Provider is following patient: Yes  Is this a 60-day recertification request?  No    Orders Requested    Physical Therapy  Request for continuation of care with increase in frequency  Frequency:  1 more PT visit next week. Patient had been non weight bearing so now will have one more visit.         Verbal orders given, provider out of office, covering provider used.  Home Care will send orders for covering provider to sign.    Shyla Newell RN

## 2023-09-07 ENCOUNTER — MEDICAL CORRESPONDENCE (OUTPATIENT)
Dept: HEALTH INFORMATION MANAGEMENT | Facility: CLINIC | Age: 78
End: 2023-09-07
Payer: COMMERCIAL

## 2023-09-11 ENCOUNTER — PATIENT OUTREACH (OUTPATIENT)
Dept: CARE COORDINATION | Facility: CLINIC | Age: 78
End: 2023-09-11
Payer: COMMERCIAL

## 2023-09-11 NOTE — PROGRESS NOTES
Contact   Chart Review     Situation: Patient chart reviewed by .    Background: following for discharge from TCU.     Assessment: Talked to patient  who was discharged over 2 weeks ago, so no TCM completed.  He is doing well at home. Has completed home care. He hired help to be with him for first two weeks at home as he needed assistance. His son and his daughter in law live close by and can help as needed.      Has been using the wheelchair at home even though he got a CAM boot and could start walking. He doesn't feel stable enough with how the heel lands so will continue to use the WC until he can get his new orthotic shoes. Appt scheduled for 10-3.      He needs eye surgery which was cancelled due to being in hospital and TCU. He also needs to have left knee replacement, and shoulder replacement in coming months.      Reviewed message from PCP regarding his medication changes.  He will check My Chart to review.  His blood pressure was 130/80 this morning.  He is checking it daily, along with his sugars.       Plan/Recommendations: Patient will call if he wants support from the care coordination program.     Bonnie Kilpatrick,   [facility]  [phone]

## 2023-09-11 NOTE — LETTER
[facility] CARE COORDINATION  [facility]  September 11, 2023    Lance Ibrahim  [address] [place] [address] [address]  [facility] [address]      Dear Lance,        I am a  clinic care coordinator who works with Rickey Adamson MD with the [facility]. I wanted to thank you for spending the time to talk with me.  Below is a description of clinic care coordination and how I can further assist you.       The clinic care coordination team is made up of a registered nurse, , financial resource worker and community health worker who understand the health care system. The goal of clinic care coordination is to help you manage your health and improve access to the health care system. Our team works alongside your provider to assist you in determining your health and social needs. We can help you obtain health care and community resources, providing you with necessary information and education. We can work with you through any barriers and develop a care plan that helps coordinate and strengthen the communication between you and your care team.  Our services are voluntary and are offered without charge to you personally.    Please feel free to contact me with any questions or concerns regarding care coordination and what we can offer.      We are focused on providing you with the highest-quality healthcare experience possible.    Sincerely,     Bonnie Kilpatrick,   [facility]  [phone]

## 2023-09-13 ENCOUNTER — TELEPHONE (OUTPATIENT)
Dept: FAMILY MEDICINE | Facility: CLINIC | Age: 78
End: 2023-09-13

## 2023-09-13 NOTE — TELEPHONE ENCOUNTER
Abran called with the following update: Pt has declined any further OT visits. Pt feels they aren't necessary. Please call with any questions or concerns.

## 2023-09-18 ENCOUNTER — TELEPHONE (OUTPATIENT)
Dept: FAMILY MEDICINE | Facility: CLINIC | Age: 78
End: 2023-09-18
Payer: COMMERCIAL

## 2023-09-18 NOTE — TELEPHONE ENCOUNTER
Order/Referral Request    Who is requesting: homecare Carolyn    Orders being requested: Verbal orders for additional PT 1X week for two more weeks    Reason service is needed/diagnosis:  balance, strengthen and gait training    When are orders needed by: asap    Has this been discussed with Provider: Yes    Does patient have a preference on a Group/Provider/Facility? n    Does patient have an appointment scheduled?: No    Where to send orders:  verbal order    Could we send this information to you in [place] or would you prefer to receive a phone call?:   Patient would prefer a phone call   Okay to leave a detailed message?: Yes at Other phone number:  [phone]

## 2023-09-19 NOTE — TELEPHONE ENCOUNTER
Spoke to Carolyn and gave okay for verbal orders as requested.    Shyla Newell, BSN RN  [facility]

## 2023-09-25 ENCOUNTER — MYC MEDICAL ADVICE (OUTPATIENT)
Dept: FAMILY MEDICINE | Facility: CLINIC | Age: 78
End: 2023-09-25
Payer: COMMERCIAL

## 2023-09-25 ENCOUNTER — TELEPHONE (OUTPATIENT)
Dept: FAMILY MEDICINE | Facility: CLINIC | Age: 78
End: 2023-09-25
Payer: COMMERCIAL

## 2023-09-25 NOTE — TELEPHONE ENCOUNTER
Carolyn from home care calls to requesting to add 2 more physical therapy visits to plan of care to week of Oct 9th and Oct 16th.     Routing to primary care provider.    Thank you,  Darell Cramer, Triage RN [facility]  3:57 PM 9/25/2023

## 2023-09-25 NOTE — TELEPHONE ENCOUNTER
Dr Adamson,  Please see MyChart message from pt and advise.    PASTORA SolN, RN  [facility]

## 2023-09-26 NOTE — TELEPHONE ENCOUNTER
"Forwarded Rickey Adamson message to pt.   \"They are good he can do it once a week with regards to his blood pressure \"  "

## 2023-10-02 ENCOUNTER — TELEPHONE (OUTPATIENT)
Dept: FAMILY MEDICINE | Facility: CLINIC | Age: 78
End: 2023-10-02
Payer: COMMERCIAL

## 2023-10-02 VITALS — DIASTOLIC BLOOD PRESSURE: 59 MMHG | SYSTOLIC BLOOD PRESSURE: 120 MMHG

## 2023-10-02 NOTE — TELEPHONE ENCOUNTER
FYI - Status Update    Who is Calling: Homecare- Carolyn    Update: Lance has a small sore on his left tore, used a lidocaine patch and when he pulled it off some skin came with it on his left foot. The Skilled nurse for wound care is aware of it and will be over to see him tomorrow.     Does caller want a call/response back: No

## 2023-10-02 NOTE — TELEPHONE ENCOUNTER
Writer added patient reported [facility]    Apple Ribeiro RN, BSN  [facility]

## 2023-10-03 ENCOUNTER — DOCUMENTATION ONLY (OUTPATIENT)
Dept: VASCULAR SURGERY | Facility: CLINIC | Age: 78
End: 2023-10-03
Payer: COMMERCIAL

## 2023-10-03 DIAGNOSIS — E11.42 DIABETIC PERIPHERAL NEUROPATHY ASSOCIATED WITH TYPE 2 DIABETES MELLITUS (H): ICD-10-CM

## 2023-10-03 DIAGNOSIS — M86.9: Primary | ICD-10-CM

## 2023-10-03 NOTE — PHARMACY-ADMISSION MEDICATION HISTORY
Admission medication history interview status for this patient is complete. See [facility] admission navigator for allergy information, prior to admission medications and immunization status.     Medication history interview done, indicate source(s): Patient  Medication history resources (including written lists, pill bottles, clinic record): care everywhere  Pharmacy: [facility] on [address]    Changes made to PTA medication list:  Added: nothing  Changed: nothing  Reported as Not Taking: nothing  Removed: nothing     Actions taken by pharmacist (provider contacted, etc):None     Additional medication history information: Patient takes all once daily medications in the morning, and his last dose of everything was yesterday.     Medication reconciliation/reorder completed by provider prior to medication history?  N   (Y/N)     Prior to Admission medications    Medication Sig Last Dose Taking? Auth Provider   amLODIPine (NORVASC) 5 MG tablet TAKE 1 TABLET BY MOUTH EVERY DAY 7/27/2021 at am Yes Anh Spivey NP   aspirin (ASA) 81 MG EC tablet Take 1 tablet (81 mg) by mouth daily 7/27/2021 at am Yes Nas Linda MD   atorvastatin (LIPITOR) 40 MG tablet Take 1 tablet (40 mg) by mouth daily 7/27/2021 at am Yes Lars Martines MD   colchicine (MITIGARE) 0.6 MG capsule TAKE 1 CAPSULE (0.6 MG) BY MOUTH 2 TIMES DAILY 7/27/2021 at Unknown time Yes Anh Spivey NP   diclofenac (VOLTAREN) 1 % topical gel Place onto the skin daily as needed  Past Month at Unknown time Yes Reported, Patient   Ferrous Gluconate 240 (27 Fe) MG TABS Take 1 tablet by mouth daily  7/27/2021 at sm Yes Reported, Patient   finasteride (PROSCAR) 5 MG tablet Take 5 mg by mouth daily. 7/27/2021 at am Yes Reported, Patient   fluticasone (FLONASE) 50 MCG/ACT spray Spray 1 spray into both nostrils nightly as needed  Past Week at pm Yes Unknown, Entered By History   isosorbide mononitrate (IMDUR) 30 MG 24 hr tablet Take 1 tablet  (30 mg) by mouth daily 7/27/2021 at am Yes Lars Martines MD   lisinopril (ZESTRIL) 10 MG tablet TAKE 1 TABLET BY MOUTH EVERY DAY 7/27/2021 at am Yes Anh Spivey NP   loratadine (CLARITIN) 10 MG tablet Take 10 mg by mouth nightly as needed  7/27/2021 at pm Yes Reported, Patient   metFORMIN (GLUCOPHAGE) 1000 MG tablet TAKE 1 TABLET BY MOUTH TWICE A DAY WITH MEALS 7/27/2021 at 2x Yes Anh Spivey NP   Multiple Vitamins-Minerals (MULTIVITAMIN ADULTS PO) Take 1 tablet by mouth daily  7/27/2021 at am Yes Reported, Patient   tamsulosin (FLOMAX) 0.4 MG 24 hr capsule Take 1 capsule by mouth daily. 7/27/2021 at am Yes Reported, Patient   torsemide (DEMADEX) 20 MG tablet TAKE 1 TABLET BY MOUTH EVERY DAY 7/27/2021 at am Yes Anh Spivey NP          normal/well-groomed/no distress

## 2023-10-09 ENCOUNTER — TELEPHONE (OUTPATIENT)
Dept: FAMILY MEDICINE | Facility: CLINIC | Age: 78
End: 2023-10-09
Payer: COMMERCIAL

## 2023-10-09 NOTE — TELEPHONE ENCOUNTER
Reason for call:  Other     Patient called regarding (reason for call): FYJACQUELINE    Additional comments: Kay is calling from [facility] to update you on this patient. Patient is discharging from all home services Lance is able to drive himself. Please advise and call Kay back if needed please and thank you.    Phone number to reach patient:  Other phone number:  [phone]    Best Time:  any

## 2023-10-11 ENCOUNTER — MEDICAL CORRESPONDENCE (OUTPATIENT)
Dept: HEALTH INFORMATION MANAGEMENT | Facility: CLINIC | Age: 78
End: 2023-10-11
Payer: COMMERCIAL

## 2023-10-12 ENCOUNTER — TELEPHONE (OUTPATIENT)
Dept: FAMILY MEDICINE | Facility: CLINIC | Age: 78
End: 2023-10-12

## 2023-10-12 NOTE — TELEPHONE ENCOUNTER
Niru from [facility] stated that she had faxed over some forms for Dr Adamson to sign twice and they still have not received the forms back.    Advised her to refax it and writer will place it in Dr Adamson's inbox. Informed Niru that Dr Adamson is out of office until next Tuesday.    PASTORA SolN, RN  [facility]

## 2023-10-20 ENCOUNTER — TRANSFERRED RECORDS (OUTPATIENT)
Dept: HEALTH INFORMATION MANAGEMENT | Facility: CLINIC | Age: 78
End: 2023-10-20
Payer: COMMERCIAL

## 2023-10-31 ENCOUNTER — MEDICAL CORRESPONDENCE (OUTPATIENT)
Dept: HEALTH INFORMATION MANAGEMENT | Facility: CLINIC | Age: 78
End: 2023-10-31
Payer: COMMERCIAL

## 2023-11-15 ENCOUNTER — TRANSFERRED RECORDS (OUTPATIENT)
Dept: HEALTH INFORMATION MANAGEMENT | Facility: CLINIC | Age: 78
End: 2023-11-15
Payer: COMMERCIAL

## 2023-11-15 NOTE — PROGRESS NOTES
DISCHARGE  Reason for Discharge: No Further PT visits scheduled    Equipment Issued: compression pump, compression wrapping    Discharge Plan: Patient to continue home program.    Referring Provider:  Nakul Salazar

## 2023-11-18 DIAGNOSIS — E11.9 TYPE 2 DIABETES, HBA1C GOAL < 7% (H): ICD-10-CM

## 2023-11-20 DIAGNOSIS — E78.5 DYSLIPIDEMIA: ICD-10-CM

## 2023-11-20 RX ORDER — ORAL SEMAGLUTIDE 7 MG/1
7 TABLET ORAL DAILY
Qty: 90 TABLET | Refills: 3 | Status: SHIPPED | OUTPATIENT
Start: 2023-11-20 | End: 2024-03-06

## 2023-11-21 RX ORDER — ATORVASTATIN CALCIUM 40 MG/1
TABLET, FILM COATED ORAL
Qty: 90 TABLET | Refills: 0 | Status: ON HOLD | OUTPATIENT
Start: 2023-11-21 | End: 2024-05-02

## 2023-11-30 ENCOUNTER — OFFICE VISIT (OUTPATIENT)
Dept: CARDIOLOGY | Facility: CLINIC | Age: 78
End: 2023-11-30
Attending: INTERNAL MEDICINE
Payer: COMMERCIAL

## 2023-11-30 VITALS
BODY MASS INDEX: 31.03 KG/M2 | TEMPERATURE: 98 F | OXYGEN SATURATION: 98 % | WEIGHT: 229.1 LBS | SYSTOLIC BLOOD PRESSURE: 124 MMHG | RESPIRATION RATE: 18 BRPM | HEART RATE: 80 BPM | HEIGHT: 72 IN | DIASTOLIC BLOOD PRESSURE: 72 MMHG

## 2023-11-30 DIAGNOSIS — I10 ESSENTIAL HYPERTENSION: ICD-10-CM

## 2023-11-30 DIAGNOSIS — E78.5 DYSLIPIDEMIA: ICD-10-CM

## 2023-11-30 DIAGNOSIS — I42.8 OTHER CARDIOMYOPATHY (H): Primary | ICD-10-CM

## 2023-11-30 DIAGNOSIS — I25.10 CORONARY ARTERY DISEASE INVOLVING NATIVE CORONARY ARTERY OF NATIVE HEART WITHOUT ANGINA PECTORIS: ICD-10-CM

## 2023-11-30 PROCEDURE — 99214 OFFICE O/P EST MOD 30 MIN: CPT | Performed by: INTERNAL MEDICINE

## 2023-11-30 RX ORDER — CALCIUM CARBONATE 500 MG/1
1000 TABLET, CHEWABLE ORAL 3 TIMES DAILY PRN
COMMUNITY
Start: 2023-08-17

## 2023-11-30 NOTE — LETTER
11/30/2023    Rickey Adamson MD  [address]  [facility] [address]    RE: Lance MORALES Patrice       Dear Colleague,     I had the pleasure of seeing Lance Ibrahim in the [facility].         [facility] [address], [place] [address]   [email or web address]   OFFICE: [phone]            Impression and Plan     1.  Coronary artery disease.  As noted below, Lance has a history of coronary disease.  Specifically, he has  primarily mild nonobstructive coronary disease he did have a small distal LAD with apparent subtotal occlusion very distally for which medical treatment has historically been advocated (see Cardiac Diagnostic section below for angiogram results).     This is stable.  He denies any anginal type chest pain.     2.  Cardiomyopathy.  Lance has a history of mildly reduced left ventricular systolic function though most recent echocardiogram 16 March 2020 revealed ejection fraction of 50-55%.  He also has a history of hypervolemia/fluid retention related to impaired diastolic filling.  This also has been stable.  The exception of his lower extremity edema which is chronic and stable, he otherwise appears volume neutral on exam.     3.  Hypertension.  Blood pressure is reasonable in the office today at 124/72 mmHg.     4.  Dyslipidemia.  Lipid profile 27 April 2023 revealed LDL 39 mg/dL and HDL 52 mg/dL.  Continue atorvastatin.       Plan on follow-up in approximately 1 year.  Lance is quite comfortable with this plan given his overall stability.      35 minutes spent reviewing prior records (including documentation, laboratory studies, cardiac testing/imaging), interview with patient along with physical exam, planning, and subsequent documentation/crafting of note).           History of Present Illness    Once again I would like to thank you again for asking me to participate in the care of your patient, Lance Ibrahim.  As you know, but to  reiterate for my own records, Lance Ibrahim is a 78 year old male with cardiomyopathy with mildly reduced left ventricular function though echocardiogram 16 March 2020 revealed improved LV function with ejection fraction of 50 to 55%.  He has a history of fluid retention felt related to impaired diastolic filling.  He is also documented as having primarily mild nonobstructive coronary disease he did have a small distal LAD with apparent subtotal occlusion very distally for which medical treatment has historically been advocated (see Cardiac Diagnostic section below for angiogram results).     On interview, Lance states that he has been doing well from a cardiac standpoint.  He denies chest pain.  His breathing is been comfortable.  He has chronic lower extremity edema though this has been stable.  He denies palpitations or lightheadedness.    Further review of systems is otherwise negative/noncontributory (medical record and 13 point review of systems reviewed as well and pertinent positives noted).         Cardiac Diagnostics      Echocardiogram 16 March 2020:  Normal left ventricular size and systolic performance with ejection fraction of 50 to 55%.  Trace aortic insufficiency.  Normal right ventricular size and systolic performance.  Normal atrial dimensions.    Regadenoson nuclear perfusion imaging study 18 February 2019:  Myocardial perfusion imaging using single isotope technique demonstrated mostly fixed mild inferior defect. Probably related to attenuation artifacts, however cannot exclude mild ischemia in this area. There is a second fixed basal anterior defect consistent with attenuation artifact. (technically very poor study)  Gated images demonstrated mildly dilated LV cavity size and mildly reduced LV systolic function.  The left ventricular systolic function is 49%.  Compared to the prior study from no prior study.    Coronary angiography 5 March 2019:  Left dominant coronary system.   Left main artery:  "The left main artery has disease in its proximal portion.  It is best visualized in the straight caudal view. It appears to be no more than 30-40%, but I am suspicious that the entire left main may actually be somewhat diffusely diseased. The reason is if one applies Bo's law, this left main artery even distally where it is \"normal\" is still smaller than the proximal LAD and the left circumflex which would violate Bo's law suggesting to me that the left main is probably diffusely diseased.  Left anterior descending:  The LAD has a proximal narrowing before the first septal . It is a smooth narrowing of approximately 30-35%. The remainder of the LAD is relatively normal. The remainder of the LAD is relatively normal until one gets to the apex. At this point the LAD is a very small vessel measuring less than 1.5 to 1.7 mm in diameter. The vessel is subtotally occluded. This is a wraparound LAD however, so no doubt the apex and the distal inferior wall would appear ischemic on the stress test. As I mentioned, the stress test actually showed fixed infarct, not necessarily reversible ischemia.  Left circumflex: The left circumflex as mentioned is a dominant system. There is mild but diffuse disease throughout the proximal left circumflex with narrowing up to 30-35% narrowing. There is then only trivial scattered plaque from the mid left circumflex into the left PDA.  Ramus intermedius: There is a ramus intermedius which could also be called a high OM1. It has only mild disease under 35%.  Right coronary artery:  The right coronary artery is a nondominant vessel. It has mild disease in its midportion. It may have up to 30-40% narrowing, but again the vessel is well under 1.75 mm in diameter at this point.    Ankle-brachial indices 26 August 2022:  Right lower extremity: KAREN at rest is normal.  Left lower extremity: KAREN at rest is normal.            Physical Examination       /72 (BP Location: Left " arm, Patient Position: Sitting, Cuff Size: Adult Large)   Pulse 80   Temp 98  F (36.7  C) (Oral)   Resp 18   Ht 1.829 m (6')   Wt 103.9 kg (229 lb 1.6 oz)   SpO2 98%   BMI 31.07 kg/m          Wt Readings from Last 3 Encounters:   11/30/23 103.9 kg (229 lb 1.6 oz)   08/07/23 99.1 kg (218 lb 8 oz)   07/19/23 100.6 kg (221 lb 12.8 oz)       The patient is alert and oriented times three. Sclerae are anicteric. Mucosal membranes are moist. Jugular venous pressure is normal. No significant adenopathy/thyromegally appreciated. Lungs are clear with good expansion. On cardiovascular exam, the patient has a regular S1 and S2. Abdomen is soft and non-tender. Extremities reveal no clubbing, cyanosis, or edema.         Family History/Social History/Risk Factors   Patient does not smoke.  Family history reviewed, and family history includes Alcohol/Drug in his paternal grandfather; Cancer in his father; Diabetes in his maternal grandfather; Family History Negative in his mother.          Medical History  Surgical History Family History Social History   Past Medical History:   Diagnosis Date    Anemia     Arthritis     osteoarthritis knees    BPH     CAD (coronary artery disease)     subtotal occlusion in the small distal LAD     Cardiomyopathy     Cardiomyopathy (H)     Cerebral artery occlusion with cerebral infarction     TIA 1993, no residual    Cervicalgia     Chronic kidney disease     Chronic low back pain     Chronic rhinitis     Chronic rhinitis     Gouty arthropathy     HTN (hypertension)     Hyperlipidemia     Hypertension     Kidney stone     Nocturia     Obesity     PVD (peripheral vascular disease)     Sleep apnea     doesn't use cpap    TIA (transient ischaemic attack) 1993    Type 2 diabetes mellitus - 11/08/2018    Ureteral stone      Past Surgical History:   Procedure Laterality Date    AMPUTATE FOOT Right 8/7/2023    Procedure: AMPUTATION, right hallux;  Surgeon: Nakul Salazar DPM;  Location:   [facility]    AMPUTATE TOE(S) Left 10/15/2018    Procedure: AMPUTATE TOE(S);  Left third toe amputation;  Surgeon: Ayaka Azevedo DPM, Podiatry/Foot and Ankle Surgery;  Location: [facility] OR    ARTHROPLASTY KNEE Right 2018    Procedure: Right total knee arthroplasty;  Surgeon: Issa Cunha MD;  Location: [facility] OR    COLONOSCOPY  2013    Procedure: COLONOSCOPY;  COLONOSCOPY;  Surgeon: Chau Hogan MD;  Location:  GI    CV HEART CATHETERIZATION WITH POSSIBLE INTERVENTION Left 2019    Procedure: Coronary Angiogram;  Surgeon: Nas Linda MD;  Location:  HEART CARDIAC CATH LAB    Diastasis recti repair  1985    EXTRACAPSULAR CATARACT EXTRATION WITH INTRAOCULAR LENS IMPLANT Left 2017    EXTRACAPSULAR CATARACT EXTRATION WITH INTRAOCULAR LENS IMPLANT Right     FOOT SURGERY  2013    cyst removal     HERNIA REPAIR  2004    ventral     ORIF Shoulder Left     Ventral hernia repair NOS  1987     Family History   Problem Relation Age of Onset    Family History Negative Mother          86 yo    Cancer Father          76 yo brain    Diabetes Maternal Grandfather          89 yo    Alcohol/Drug Paternal Grandfather             Colon Cancer No family hx of         Social History     Socioeconomic History    Marital status:      Spouse name: Not on file    Number of children: Not on file    Years of education: Not on file    Highest education level: Not on file   Occupational History     Employer: SELF   Tobacco Use    Smoking status: Former     Packs/day: 0     Types: Cigarettes     Quit date: 3/17/1993     Years since quittin.7    Smokeless tobacco: Never   Vaping Use    Vaping Use: Never used   Substance and Sexual Activity    Alcohol use: Not Currently    Drug use: No    Sexual activity: Never   Other Topics Concern    Parent/sibling w/ CABG, MI or angioplasty before 65F 55M? Not Asked   Social History Narrative    Not on file     Social  Determinants of Health     Financial Resource Strain: Not on file   Food Insecurity: Not on file   Transportation Needs: Not on file   Physical Activity: Not on file   Stress: Not on file   Social Connections: Not on file   Interpersonal Safety: Not on file   Housing Stability: Not on file           Medications  Allergies   Current Outpatient Medications   Medication Sig Dispense Refill    amLODIPine (NORVASC) 5 MG tablet Take 1 tablet (5 mg) by mouth daily 90 tablet 3    aspirin (ASA) 81 MG EC tablet Take by mouth every 24 hours      atorvastatin (LIPITOR) 40 MG tablet TAKE 1 TABLET EVERY 24 HOURS 90 tablet 0    blood glucose monitoring (NO BRAND SPECIFIED) meter device kit Use to test blood sugar 2 times daily or as directed. 1 kit 0    calcium carbonate (TUMS) 500 MG chewable tablet Take 1,000 mg by mouth      colchicine (COLCYRS) 0.6 MG tablet TAKE 1 TABLET DAILY 90 tablet 3    CONTOUR NEXT TEST test strip USE TO TEST BLOOD SUGAR 2 TIMES DAILY OR AS DIRECTED. 100 strip 0    Ferrous Gluconate 240 (27 Fe) MG TABS Take 1 tablet by mouth daily       finasteride (PROSCAR) 5 MG tablet Take by mouth every 24 hours      fluticasone (FLONASE) 50 MCG/ACT nasal spray Spray 1-2 sprays in nostril every 24 hours      gabapentin (NEURONTIN) 300 MG capsule Take 1 capsule (300 mg) by mouth 3 times daily 270 capsule 0    isosorbide mononitrate (IMDUR) 30 MG 24 hr tablet Take 1 tablet (30 mg) by mouth daily 90 tablet 3    lisinopril (ZESTRIL) 10 MG tablet Take 1 tablet (10 mg) by mouth every 24 hours 90 tablet 3    loratadine (CLARITIN) 10 MG tablet Take by mouth every 24 hours      metFORMIN (GLUCOPHAGE) 1000 MG tablet TAKE 1 TABLET TWICE A DAY WITH MEALS 180 tablet 3    Microlet Lancets MISC USE TO TEST BLOOD SUGAR 2 TIMES DAILY OR AS DIRECTED. 100 each 1    multivitamin w/minerals (THERA-VIT-M) tablet Take 1 tablet by mouth daily      Probiotic Product (FORTIFY 30 BILLION PROBIOT 50+) CPDR Take 1 5 Pack by mouth daily 90  "capsule 1    RYBELSUS 7 MG tablet TAKE 1 TABLET DAILY 90 tablet 3    tamsulosin (FLOMAX) 0.4 MG 24 hr capsule Take 1 capsule by mouth daily.      tiZANidine (ZANAFLEX) 2 MG tablet Take 2-4 mg by mouth every 6 hours as needed for muscle spasms Not to exceed 3 doses/24 hours      torsemide (DEMADEX) 20 MG tablet Take 1 tablet (20 mg) by mouth daily 90 tablet 2       Allergies   Allergen Reactions    Penicillins Anaphylaxis     8/25/22 - tolerated cefepime     Sulfa Antibiotics      \"itchy rash, swelling of face and hands\"    Ancef [Cefazolin] Rash          Lab Results    Chemistry/lipid CBC Cardiac Enzymes/BNP/TSH/INR   Recent Labs   Lab Test 04/27/23  1248   CHOL 108   HDL 52   LDL 39   TRIG 87     Recent Labs   Lab Test 04/27/23  1248 06/16/22  0934 09/29/21  0913   LDL 39 67 48     Recent Labs   Lab Test 08/17/23  0852      POTASSIUM 4.5   CHLORIDE 104   CO2 22   *   BUN 46.6*   CR 1.65*   GFRESTIMATED 42*   SHANNAN 9.7     Recent Labs   Lab Test 08/17/23  0852 08/15/23  0820 08/10/23  0559   CR 1.65* 2.06* 1.53*     Recent Labs   Lab Test 07/19/23  0904 04/27/23  1248 09/09/22  1145   A1C 7.3* 7.7* 7.2*          Recent Labs   Lab Test 08/15/23  0820   WBC 9.3   HGB 10.5*   HCT 35.7*   MCV 91        Recent Labs   Lab Test 08/15/23  0820 08/08/23  0554 07/19/23  0904   HGB 10.5* 9.9* 10.2*    No results for input(s): \"TROPONINI\" in the last 16477 hours.  Recent Labs   Lab Test 02/28/20  1318 02/17/19  0723   NTBNPI  --  633   NTBNP 178  --      Recent Labs   Lab Test 01/24/20  1359   TSH 3.24     Recent Labs   Lab Test 12/25/21  2000 03/04/19  0947 02/26/19  1539   INR 1.17* 1.13 1.04          Medications  Allergies   Current Outpatient Medications   Medication Sig Dispense Refill    amLODIPine (NORVASC) 5 MG tablet Take 1 tablet (5 mg) by mouth daily 90 tablet 3    aspirin (ASA) 81 MG EC tablet Take by mouth every 24 hours      atorvastatin (LIPITOR) 40 MG tablet TAKE 1 TABLET EVERY 24 HOURS 90 " "tablet 0    blood glucose monitoring (NO BRAND SPECIFIED) meter device kit Use to test blood sugar 2 times daily or as directed. 1 kit 0    calcium carbonate (TUMS) 500 MG chewable tablet Take 1,000 mg by mouth      colchicine (COLCYRS) 0.6 MG tablet TAKE 1 TABLET DAILY 90 tablet 3    CONTOUR NEXT TEST test strip USE TO TEST BLOOD SUGAR 2 TIMES DAILY OR AS DIRECTED. 100 strip 0    Ferrous Gluconate 240 (27 Fe) MG TABS Take 1 tablet by mouth daily       finasteride (PROSCAR) 5 MG tablet Take by mouth every 24 hours      fluticasone (FLONASE) 50 MCG/ACT nasal spray Spray 1-2 sprays in nostril every 24 hours      gabapentin (NEURONTIN) 300 MG capsule Take 1 capsule (300 mg) by mouth 3 times daily 270 capsule 0    isosorbide mononitrate (IMDUR) 30 MG 24 hr tablet Take 1 tablet (30 mg) by mouth daily 90 tablet 3    lisinopril (ZESTRIL) 10 MG tablet Take 1 tablet (10 mg) by mouth every 24 hours 90 tablet 3    loratadine (CLARITIN) 10 MG tablet Take by mouth every 24 hours      metFORMIN (GLUCOPHAGE) 1000 MG tablet TAKE 1 TABLET TWICE A DAY WITH MEALS 180 tablet 3    Microlet Lancets MISC USE TO TEST BLOOD SUGAR 2 TIMES DAILY OR AS DIRECTED. 100 each 1    multivitamin w/minerals (THERA-VIT-M) tablet Take 1 tablet by mouth daily      Probiotic Product (FORTIFY 30 BILLION PROBIOT 50+) CPDR Take 1 5 Pack by mouth daily 90 capsule 1    RYBELSUS 7 MG tablet TAKE 1 TABLET DAILY 90 tablet 3    tamsulosin (FLOMAX) 0.4 MG 24 hr capsule Take 1 capsule by mouth daily.      tiZANidine (ZANAFLEX) 2 MG tablet Take 2-4 mg by mouth every 6 hours as needed for muscle spasms Not to exceed 3 doses/24 hours      torsemide (DEMADEX) 20 MG tablet Take 1 tablet (20 mg) by mouth daily 90 tablet 2      Allergies   Allergen Reactions    Penicillins Anaphylaxis     8/25/22 - tolerated cefepime     Sulfa Antibiotics      \"itchy rash, swelling of face and hands\"    Ancef [Cefazolin] Rash          Lab Results   Lab Results   Component Value Date    NA " 141 08/17/2023     04/29/2021    CO2 22 08/17/2023    CO2 22 08/27/2022    CO2 27 04/29/2021    BUN 46.6 08/17/2023    BUN 16 08/27/2022    BUN 27 04/29/2021    GFR 15 12/03/2021     Lab Results   Component Value Date    WBC 9.3 08/15/2023    WBC 8.5 04/29/2021    HGB 10.5 08/15/2023    HGB 12.2 04/29/2021    HCT 35.7 08/15/2023    HCT 39.6 04/29/2021    MCV 91 08/15/2023    MCV 92 04/29/2021     08/15/2023     04/29/2021     Lab Results   Component Value Date    CHOL 108 04/27/2023    CHOL 132 04/29/2021    TRIG 87 04/27/2023    TRIG 153 04/29/2021    HDL 52 04/27/2023    HDL 53 04/29/2021     Lab Results   Component Value Date    INR 1.17 12/25/2021    INR 1.13 03/04/2019     Lab Results   Component Value Date    TSH 3.24 01/24/2020                      Thank you for allowing me to participate in the care of your patient.      Sincerely,     Lc Amaya MD     [facility]  cc:   Lc Amaya MD  [address]  [place] [address]

## 2023-11-30 NOTE — PROGRESS NOTES
[facility] [address], [place] [address]   [email or web address]   OFFICE: [phone]            Impression and Plan     1.  Coronary artery disease.  As noted below, Lance has a history of coronary disease.  Specifically, he has  primarily mild nonobstructive coronary disease he did have a small distal LAD with apparent subtotal occlusion very distally for which medical treatment has historically been advocated (see Cardiac Diagnostic section below for angiogram results).     This is stable.  He denies any anginal type chest pain.     2.  Cardiomyopathy.  Lance has a history of mildly reduced left ventricular systolic function though most recent echocardiogram 16 March 2020 revealed ejection fraction of 50-55%.  He also has a history of hypervolemia/fluid retention related to impaired diastolic filling.  This also has been stable.  The exception of his lower extremity edema which is chronic and stable, he otherwise appears volume neutral on exam.     3.  Hypertension.  Blood pressure is reasonable in the office today at 124/72 mmHg.     4.  Dyslipidemia.  Lipid profile 27 April 2023 revealed LDL 39 mg/dL and HDL 52 mg/dL.  Continue atorvastatin.       Plan on follow-up in approximately 1 year.  Lance is quite comfortable with this plan given his overall stability.      35 minutes spent reviewing prior records (including documentation, laboratory studies, cardiac testing/imaging), interview with patient along with physical exam, planning, and subsequent documentation/crafting of note).           History of Present Illness    Once again I would like to thank you again for asking me to participate in the care of your patient, Lance Ibrahim.  As you know, but to reiterate for my own records, Lance Ibrahim is a 78 year old male with cardiomyopathy with mildly reduced left ventricular function though echocardiogram 16 March 2020 revealed improved LV function with  ejection fraction of 50 to 55%.  He has a history of fluid retention felt related to impaired diastolic filling.  He is also documented as having primarily mild nonobstructive coronary disease he did have a small distal LAD with apparent subtotal occlusion very distally for which medical treatment has historically been advocated (see Cardiac Diagnostic section below for angiogram results).     On interview, Lance states that he has been doing well from a cardiac standpoint.  He denies chest pain.  His breathing is been comfortable.  He has chronic lower extremity edema though this has been stable.  He denies palpitations or lightheadedness.    Further review of systems is otherwise negative/noncontributory (medical record and 13 point review of systems reviewed as well and pertinent positives noted).         Cardiac Diagnostics      Echocardiogram 16 March 2020:  Normal left ventricular size and systolic performance with ejection fraction of 50 to 55%.  Trace aortic insufficiency.  Normal right ventricular size and systolic performance.  Normal atrial dimensions.    Regadenoson nuclear perfusion imaging study 18 February 2019:  Myocardial perfusion imaging using single isotope technique demonstrated mostly fixed mild inferior defect. Probably related to attenuation artifacts, however cannot exclude mild ischemia in this area. There is a second fixed basal anterior defect consistent with attenuation artifact. (technically very poor study)  Gated images demonstrated mildly dilated LV cavity size and mildly reduced LV systolic function.  The left ventricular systolic function is 49%.  Compared to the prior study from no prior study.    Coronary angiography 5 March 2019:  Left dominant coronary system.   Left main artery: The left main artery has disease in its proximal portion.  It is best visualized in the straight caudal view. It appears to be no more than 30-40%, but I am suspicious that the entire left main may  "actually be somewhat diffusely diseased. The reason is if one applies Bo's law, this left main artery even distally where it is \"normal\" is still smaller than the proximal LAD and the left circumflex which would violate Bo's law suggesting to me that the left main is probably diffusely diseased.  Left anterior descending:  The LAD has a proximal narrowing before the first septal . It is a smooth narrowing of approximately 30-35%. The remainder of the LAD is relatively normal. The remainder of the LAD is relatively normal until one gets to the apex. At this point the LAD is a very small vessel measuring less than 1.5 to 1.7 mm in diameter. The vessel is subtotally occluded. This is a wraparound LAD however, so no doubt the apex and the distal inferior wall would appear ischemic on the stress test. As I mentioned, the stress test actually showed fixed infarct, not necessarily reversible ischemia.  Left circumflex: The left circumflex as mentioned is a dominant system. There is mild but diffuse disease throughout the proximal left circumflex with narrowing up to 30-35% narrowing. There is then only trivial scattered plaque from the mid left circumflex into the left PDA.  Ramus intermedius: There is a ramus intermedius which could also be called a high OM1. It has only mild disease under 35%.  Right coronary artery:  The right coronary artery is a nondominant vessel. It has mild disease in its midportion. It may have up to 30-40% narrowing, but again the vessel is well under 1.75 mm in diameter at this point.    Ankle-brachial indices 26 August 2022:  Right lower extremity: KAREN at rest is normal.  Left lower extremity: KAREN at rest is normal.            Physical Examination       /72 (BP Location: Left arm, Patient Position: Sitting, Cuff Size: Adult Large)   Pulse 80   Temp 98  F (36.7  C) (Oral)   Resp 18   Ht 1.829 m (6')   Wt 103.9 kg (229 lb 1.6 oz)   SpO2 98%   BMI 31.07 kg/m        "   Wt Readings from Last 3 Encounters:   11/30/23 103.9 kg (229 lb 1.6 oz)   08/07/23 99.1 kg (218 lb 8 oz)   07/19/23 100.6 kg (221 lb 12.8 oz)       The patient is alert and oriented times three. Sclerae are anicteric. Mucosal membranes are moist. Jugular venous pressure is normal. No significant adenopathy/thyromegally appreciated. Lungs are clear with good expansion. On cardiovascular exam, the patient has a regular S1 and S2. Abdomen is soft and non-tender. Extremities reveal no clubbing, cyanosis, or edema.         Family History/Social History/Risk Factors   Patient does not smoke.  Family history reviewed, and family history includes Alcohol/Drug in his paternal grandfather; Cancer in his father; Diabetes in his maternal grandfather; Family History Negative in his mother.          Medical History  Surgical History Family History Social History   Past Medical History:   Diagnosis Date    Anemia     Arthritis     osteoarthritis knees    BPH     CAD (coronary artery disease)     subtotal occlusion in the small distal LAD     Cardiomyopathy     Cardiomyopathy (H)     Cerebral artery occlusion with cerebral infarction     TIA 1993, no residual    Cervicalgia     Chronic kidney disease     Chronic low back pain     Chronic rhinitis     Chronic rhinitis     Gouty arthropathy     HTN (hypertension)     Hyperlipidemia     Hypertension     Kidney stone     Nocturia     Obesity     PVD (peripheral vascular disease)     Sleep apnea     doesn't use cpap    TIA (transient ischaemic attack) 1993    Type 2 diabetes mellitus - 11/08/2018    Ureteral stone      Past Surgical History:   Procedure Laterality Date    AMPUTATE FOOT Right 8/7/2023    Procedure: AMPUTATION, right hallux;  Surgeon: Nakul Salazar DPM;  Location: [facility] OR    AMPUTATE TOE(S) Left 10/15/2018    Procedure: AMPUTATE TOE(S);  Left third toe amputation;  Surgeon: Ayaka Azevedo DPM, Podiatry/Foot and Ankle Surgery;  Location:  OR     ARTHROPLASTY KNEE Right 2018    Procedure: Right total knee arthroplasty;  Surgeon: Issa Cunha MD;  Location: [facility] OR    COLONOSCOPY  2013    Procedure: COLONOSCOPY;  COLONOSCOPY;  Surgeon: Chau Hogan MD;  Location:  GI    CV HEART CATHETERIZATION WITH POSSIBLE INTERVENTION Left 2019    Procedure: Coronary Angiogram;  Surgeon: Nas Linda MD;  Location:  HEART CARDIAC CATH LAB    Diastasis recti repair  1985    EXTRACAPSULAR CATARACT EXTRATION WITH INTRAOCULAR LENS IMPLANT Left 2017    EXTRACAPSULAR CATARACT EXTRATION WITH INTRAOCULAR LENS IMPLANT Right     FOOT SURGERY  2013    cyst removal     HERNIA REPAIR  2004    ventral     ORIF Shoulder Left     Ventral hernia repair NOS  1987     Family History   Problem Relation Age of Onset    Family History Negative Mother          88 yo    Cancer Father          76 yo brain    Diabetes Maternal Grandfather          91 yo    Alcohol/Drug Paternal Grandfather             Colon Cancer No family hx of         Social History     Socioeconomic History    Marital status:      Spouse name: Not on file    Number of children: Not on file    Years of education: Not on file    Highest education level: Not on file   Occupational History     Employer: SELF   Tobacco Use    Smoking status: Former     Packs/day: 0     Types: Cigarettes     Quit date: 3/17/1993     Years since quittin.7    Smokeless tobacco: Never   Vaping Use    Vaping Use: Never used   Substance and Sexual Activity    Alcohol use: Not Currently    Drug use: No    Sexual activity: Never   Other Topics Concern    Parent/sibling w/ CABG, MI or angioplasty before 65F 55M? Not Asked   Social History Narrative    Not on file     Social Determinants of Health     Financial Resource Strain: Not on file   Food Insecurity: Not on file   Transportation Needs: Not on file   Physical Activity: Not on file   Stress: Not on file    Social Connections: Not on file   Interpersonal Safety: Not on file   Housing Stability: Not on file           Medications  Allergies   Current Outpatient Medications   Medication Sig Dispense Refill    amLODIPine (NORVASC) 5 MG tablet Take 1 tablet (5 mg) by mouth daily 90 tablet 3    aspirin (ASA) 81 MG EC tablet Take by mouth every 24 hours      atorvastatin (LIPITOR) 40 MG tablet TAKE 1 TABLET EVERY 24 HOURS 90 tablet 0    blood glucose monitoring (NO BRAND SPECIFIED) meter device kit Use to test blood sugar 2 times daily or as directed. 1 kit 0    calcium carbonate (TUMS) 500 MG chewable tablet Take 1,000 mg by mouth      colchicine (COLCYRS) 0.6 MG tablet TAKE 1 TABLET DAILY 90 tablet 3    CONTOUR NEXT TEST test strip USE TO TEST BLOOD SUGAR 2 TIMES DAILY OR AS DIRECTED. 100 strip 0    Ferrous Gluconate 240 (27 Fe) MG TABS Take 1 tablet by mouth daily       finasteride (PROSCAR) 5 MG tablet Take by mouth every 24 hours      fluticasone (FLONASE) 50 MCG/ACT nasal spray Spray 1-2 sprays in nostril every 24 hours      gabapentin (NEURONTIN) 300 MG capsule Take 1 capsule (300 mg) by mouth 3 times daily 270 capsule 0    isosorbide mononitrate (IMDUR) 30 MG 24 hr tablet Take 1 tablet (30 mg) by mouth daily 90 tablet 3    lisinopril (ZESTRIL) 10 MG tablet Take 1 tablet (10 mg) by mouth every 24 hours 90 tablet 3    loratadine (CLARITIN) 10 MG tablet Take by mouth every 24 hours      metFORMIN (GLUCOPHAGE) 1000 MG tablet TAKE 1 TABLET TWICE A DAY WITH MEALS 180 tablet 3    Microlet Lancets MISC USE TO TEST BLOOD SUGAR 2 TIMES DAILY OR AS DIRECTED. 100 each 1    multivitamin w/minerals (THERA-VIT-M) tablet Take 1 tablet by mouth daily      Probiotic Product (FORTIFY 30 BILLION PROBIOT 50+) CPDR Take 1 5 Pack by mouth daily 90 capsule 1    RYBELSUS 7 MG tablet TAKE 1 TABLET DAILY 90 tablet 3    tamsulosin (FLOMAX) 0.4 MG 24 hr capsule Take 1 capsule by mouth daily.      tiZANidine (ZANAFLEX) 2 MG tablet Take 2-4 mg by  "mouth every 6 hours as needed for muscle spasms Not to exceed 3 doses/24 hours      torsemide (DEMADEX) 20 MG tablet Take 1 tablet (20 mg) by mouth daily 90 tablet 2       Allergies   Allergen Reactions    Penicillins Anaphylaxis     8/25/22 - tolerated cefepime     Sulfa Antibiotics      \"itchy rash, swelling of face and hands\"    Ancef [Cefazolin] Rash          Lab Results    Chemistry/lipid CBC Cardiac Enzymes/BNP/TSH/INR   Recent Labs   Lab Test 04/27/23  1248   CHOL 108   HDL 52   LDL 39   TRIG 87     Recent Labs   Lab Test 04/27/23  1248 06/16/22  0934 09/29/21  0913   LDL 39 67 48     Recent Labs   Lab Test 08/17/23  0852      POTASSIUM 4.5   CHLORIDE 104   CO2 22   *   BUN 46.6*   CR 1.65*   GFRESTIMATED 42*   SHANNAN 9.7     Recent Labs   Lab Test 08/17/23  0852 08/15/23  0820 08/10/23  0559   CR 1.65* 2.06* 1.53*     Recent Labs   Lab Test 07/19/23  0904 04/27/23  1248 09/09/22  1145   A1C 7.3* 7.7* 7.2*          Recent Labs   Lab Test 08/15/23  0820   WBC 9.3   HGB 10.5*   HCT 35.7*   MCV 91        Recent Labs   Lab Test 08/15/23  0820 08/08/23  0554 07/19/23  0904   HGB 10.5* 9.9* 10.2*    No results for input(s): \"TROPONINI\" in the last 28647 hours.  Recent Labs   Lab Test 02/28/20  1318 02/17/19  0723   NTBNPI  --  633   NTBNP 178  --      Recent Labs   Lab Test 01/24/20  1359   TSH 3.24     Recent Labs   Lab Test 12/25/21  2000 03/04/19  0947 02/26/19  1539   INR 1.17* 1.13 1.04          Medications  Allergies   Current Outpatient Medications   Medication Sig Dispense Refill    amLODIPine (NORVASC) 5 MG tablet Take 1 tablet (5 mg) by mouth daily 90 tablet 3    aspirin (ASA) 81 MG EC tablet Take by mouth every 24 hours      atorvastatin (LIPITOR) 40 MG tablet TAKE 1 TABLET EVERY 24 HOURS 90 tablet 0    blood glucose monitoring (NO BRAND SPECIFIED) meter device kit Use to test blood sugar 2 times daily or as directed. 1 kit 0    calcium carbonate (TUMS) 500 MG chewable tablet Take 1,000 " "mg by mouth      colchicine (COLCYRS) 0.6 MG tablet TAKE 1 TABLET DAILY 90 tablet 3    CONTOUR NEXT TEST test strip USE TO TEST BLOOD SUGAR 2 TIMES DAILY OR AS DIRECTED. 100 strip 0    Ferrous Gluconate 240 (27 Fe) MG TABS Take 1 tablet by mouth daily       finasteride (PROSCAR) 5 MG tablet Take by mouth every 24 hours      fluticasone (FLONASE) 50 MCG/ACT nasal spray Spray 1-2 sprays in nostril every 24 hours      gabapentin (NEURONTIN) 300 MG capsule Take 1 capsule (300 mg) by mouth 3 times daily 270 capsule 0    isosorbide mononitrate (IMDUR) 30 MG 24 hr tablet Take 1 tablet (30 mg) by mouth daily 90 tablet 3    lisinopril (ZESTRIL) 10 MG tablet Take 1 tablet (10 mg) by mouth every 24 hours 90 tablet 3    loratadine (CLARITIN) 10 MG tablet Take by mouth every 24 hours      metFORMIN (GLUCOPHAGE) 1000 MG tablet TAKE 1 TABLET TWICE A DAY WITH MEALS 180 tablet 3    Microlet Lancets MISC USE TO TEST BLOOD SUGAR 2 TIMES DAILY OR AS DIRECTED. 100 each 1    multivitamin w/minerals (THERA-VIT-M) tablet Take 1 tablet by mouth daily      Probiotic Product (FORTIFY 30 BILLION PROBIOT 50+) CPDR Take 1 5 Pack by mouth daily 90 capsule 1    RYBELSUS 7 MG tablet TAKE 1 TABLET DAILY 90 tablet 3    tamsulosin (FLOMAX) 0.4 MG 24 hr capsule Take 1 capsule by mouth daily.      tiZANidine (ZANAFLEX) 2 MG tablet Take 2-4 mg by mouth every 6 hours as needed for muscle spasms Not to exceed 3 doses/24 hours      torsemide (DEMADEX) 20 MG tablet Take 1 tablet (20 mg) by mouth daily 90 tablet 2      Allergies   Allergen Reactions    Penicillins Anaphylaxis     8/25/22 - tolerated cefepime     Sulfa Antibiotics      \"itchy rash, swelling of face and hands\"    Ancef [Cefazolin] Rash          Lab Results   Lab Results   Component Value Date     08/17/2023     04/29/2021    CO2 22 08/17/2023    CO2 22 08/27/2022    CO2 27 04/29/2021    BUN 46.6 08/17/2023    BUN 16 08/27/2022    BUN 27 04/29/2021    GFR 15 12/03/2021     Lab " Results   Component Value Date    WBC 9.3 08/15/2023    WBC 8.5 04/29/2021    HGB 10.5 08/15/2023    HGB 12.2 04/29/2021    HCT 35.7 08/15/2023    HCT 39.6 04/29/2021    MCV 91 08/15/2023    MCV 92 04/29/2021     08/15/2023     04/29/2021     Lab Results   Component Value Date    CHOL 108 04/27/2023    CHOL 132 04/29/2021    TRIG 87 04/27/2023    TRIG 153 04/29/2021    HDL 52 04/27/2023    HDL 53 04/29/2021     Lab Results   Component Value Date    INR 1.17 12/25/2021    INR 1.13 03/04/2019     Lab Results   Component Value Date    TSH 3.24 01/24/2020

## 2023-12-03 DIAGNOSIS — E11.9 TYPE 2 DIABETES, HBA1C GOAL < 7% (H): ICD-10-CM

## 2023-12-04 ENCOUNTER — OFFICE VISIT (OUTPATIENT)
Dept: FAMILY MEDICINE | Facility: CLINIC | Age: 78
End: 2023-12-04
Payer: COMMERCIAL

## 2023-12-04 ENCOUNTER — MYC MEDICAL ADVICE (OUTPATIENT)
Dept: FAMILY MEDICINE | Facility: CLINIC | Age: 78
End: 2023-12-04
Payer: COMMERCIAL

## 2023-12-04 ENCOUNTER — NURSE TRIAGE (OUTPATIENT)
Dept: FAMILY MEDICINE | Facility: CLINIC | Age: 78
End: 2023-12-04
Payer: COMMERCIAL

## 2023-12-04 VITALS
HEART RATE: 85 BPM | BODY MASS INDEX: 30.92 KG/M2 | DIASTOLIC BLOOD PRESSURE: 67 MMHG | OXYGEN SATURATION: 98 % | SYSTOLIC BLOOD PRESSURE: 122 MMHG | TEMPERATURE: 97.8 F | WEIGHT: 228 LBS | RESPIRATION RATE: 16 BRPM

## 2023-12-04 DIAGNOSIS — M10.9 ACUTE GOUT OF RIGHT HAND, UNSPECIFIED CAUSE: Primary | ICD-10-CM

## 2023-12-04 DIAGNOSIS — E11.621 TYPE 2 DIABETES MELLITUS WITH FOOT ULCER, WITHOUT LONG-TERM CURRENT USE OF INSULIN (H): ICD-10-CM

## 2023-12-04 DIAGNOSIS — L97.509 TYPE 2 DIABETES MELLITUS WITH FOOT ULCER, WITHOUT LONG-TERM CURRENT USE OF INSULIN (H): ICD-10-CM

## 2023-12-04 PROCEDURE — 99214 OFFICE O/P EST MOD 30 MIN: CPT | Performed by: PHYSICIAN ASSISTANT

## 2023-12-04 RX ORDER — LANCETS
EACH MISCELLANEOUS
Qty: 100 EACH | Refills: 1 | Status: SHIPPED | OUTPATIENT
Start: 2023-12-04 | End: 2024-06-26

## 2023-12-04 RX ORDER — PREDNISONE 20 MG/1
40 TABLET ORAL DAILY
Qty: 10 TABLET | Refills: 0 | Status: SHIPPED | OUTPATIENT
Start: 2023-12-04 | End: 2023-12-09

## 2023-12-04 NOTE — TELEPHONE ENCOUNTER
"50 % larger  Taking 2 of the 0.6 mg colchicine daily  Has been taking this for 2 years.    Nurse Triage SBAR    Is this a 2nd Level Triage? NO    Situation: Pain and swelling in little finger.    Background: History of gout in both fingers of hands.  Has been treating this for years with colchicine.    Assessment: Pain rated 9/10 in right little finger, the knuckle and base of finger.  Swelling and red.  Is not warm to touch.  Does not have a fever.  Patient has been taking 2 of the 0.6 mg colchicine for past few days.  Is ordered one tab daily.  This has been painful, and constant pain, since last Wednesday (5 days ago).     Protocol Recommended Disposition:   Go To Office Now - no availability today.  Assisted to make appointment at walk in care for today.  Plans to go.     Recommendation: Plans to go to walk in clinic today and advised to call if has any concerns.  Patient verbalized understanding.    TENZIN Zelaya RN  [facility]            Does the patient meet one of the following criteria for ADS visit consideration? 15+ years old, with an [facility] PCP     TIP  Providers, please consider if this condition is appropriate for management at one of our Acute and Diagnostic Services sites.     If patient is a good candidate, please use dotphrase <dot>triageresponse and select Refer to ADS to document.  Reason for Disposition   SEVERE pain (e.g., excruciating, unable to use hand at all)    Additional Information   Negative: Followed an injury   Negative: Wound looks infected   Negative: Caused by an animal bite   Negative: Caused by frostbite   Negative: Hand or wrist pain is main symptom   Negative: Looks infected (spreading redness, red streak, pus) and severe pain with movement   Negative: Patient sounds very sick or weak to the triager    Answer Assessment - Initial Assessment Questions  1. ONSET: \"When did the pain start?\"       Last week on Wednesday - 5 days ago  2. LOCATION and " "RADIATION: \"Where is the pain located?\"  (e.g., fingertip, around nail, joint, entire   finger)       Swollen little finger in the bottom of finger and knuckle  3. SEVERITY: \"How bad is the pain?\" \"What does it keep you from doing?\"   (Scale 1-10; or mild, moderate, severe)   - MILD (1-3): doesn't interfere with normal activities.    - MODERATE (4-7): interferes with normal activities or awakens from sleep.   - SEVERE (8-10): excruciating pain, unable to hold a glass of water or bend finger even a little.      9/10  4. APPEARANCE: \"What does the finger look like?\" (e.g., redness, swelling, bruising, pallor)      Red, swollen and not warm.  But painful  5. WORK OR EXERCISE: \"Has there been any recent work or exercise that involved this part (i.e., fingers or hand) of the body?\"      no  6. CAUSE: \"What do you think is causing the pain?\"      Gout flare up  7. AGGRAVATING FACTORS: \"What makes the pain worse?\" (e.g., using computer)      Got very bad and stayed there. Staying constant.   8. OTHER SYMPTOMS: \"Do you have any other symptoms?\" (e.g., fever, neck pain, numbness)      no  9. PREGNANCY: \"Is there any chance you are pregnant?\" \"When was your last menstrual period?\"      no    Protocols used: Finger Pain-A-[place]    "

## 2023-12-04 NOTE — PROGRESS NOTES
Chief Complaint   Patient presents with    Joint Swelling     Patient has right 5th finger swelling x 6 days. Patient states he thinks its gout.        ASSESSMENT/PLAN:  Lance was seen today for joint swelling.    Diagnoses and all orders for this visit:    Acute gout of right hand, unspecified cause  -     predniSONE (DELTASONE) 20 MG tablet; Take 2 tablets (40 mg) by mouth daily for 5 days    Type 2 diabetes mellitus with foot ulcer, without long-term current use of insulin (H)    History and exam consistent with gout.  Start on prednisone above.  Discussed short-term and long-term side effects and risks.  Watch sugars closely  Return precautions discussed    Charanjit Siu PA-C      SUBJECTIVE:  Lance is a 78 year old male who presents to urgent care with concerns for gout.  He has atraumatic swelling of the right pinky finger mainly in the DIP.  He has been on colchicine for the last 2 years which has done a really good job of preventing most flares.  He states it feels very consistent with his previous gout flares    ROS: Pertinent ROS neg other than the symptoms noted above in the HPI.     OBJECTIVE:  /67   Pulse 85   Temp 97.8  F (36.6  C) (Oral)   Resp 16   Wt 103.4 kg (228 lb)   SpO2 98%   BMI 30.92 kg/m     GENERAL: healthy, alert and no distress  MS: Swelling and slight erythema of the right pinky DIP with significant tenderness  SKIN: no suspicious lesions or rashes    DIAGNOSTICS    No results found for any visits on 12/04/23.     Current Outpatient Medications   Medication    amLODIPine (NORVASC) 5 MG tablet    aspirin (ASA) 81 MG EC tablet    atorvastatin (LIPITOR) 40 MG tablet    blood glucose monitoring (NO BRAND SPECIFIED) meter device kit    calcium carbonate (TUMS) 500 MG chewable tablet    colchicine (COLCYRS) 0.6 MG tablet    CONTOUR NEXT TEST test strip    Ferrous Gluconate 240 (27 Fe) MG TABS    finasteride (PROSCAR) 5 MG tablet    fluticasone (FLONASE) 50 MCG/ACT nasal spray     gabapentin (NEURONTIN) 300 MG capsule    isosorbide mononitrate (IMDUR) 30 MG 24 hr tablet    lisinopril (ZESTRIL) 10 MG tablet    loratadine (CLARITIN) 10 MG tablet    metFORMIN (GLUCOPHAGE) 1000 MG tablet    Microlet Lancets MISC    multivitamin w/minerals (THERA-VIT-M) tablet    Probiotic Product (FORTIFY 30 BILLION PROBIOT 50+) CPDR    RYBELSUS 7 MG tablet    tamsulosin (FLOMAX) 0.4 MG 24 hr capsule    tiZANidine (ZANAFLEX) 2 MG tablet    torsemide (DEMADEX) 20 MG tablet     No current facility-administered medications for this visit.      Patient Active Problem List   Diagnosis    Ventral hernia    Chronic rhinitis    Hypertrophy of prostate with urinary obstruction    Transient cerebral ischemia    Hypertension    CARDIOVASCULAR SCREENING; LDL GOAL LESS THAN 100    Gout    Type 2 diabetes, HbA1c goal < 7% (H)    Cervicalgia    Amputated toe, left (H24)    Type II diabetes mellitus (H)    Total knee replacement status    Dyspnea on exertion    Chest pain, unspecified type    Cardiomyopathy, unspecified type (H)    Shortness of breath    Renal colic    Obesity (BMI 35.0-39.9) with comorbidity (H)    Stage 3b chronic kidney disease (H)    Ulcerated, foot (H)    Ulcerated, foot, unspecified laterality, limited to breakdown of skin (H)    Acute renal insufficiency    Acute right-sided thoracic back pain    Other constipation    Acute idiopathic gout of right hand    H/O amputation of lesser toe, left (H24)    Lower urinary tract symptoms    Gouty arthropathy    Skin rash    Cellulitis of right lower extremity    Diabetic ulcer of toe of right foot associated with diabetes mellitus due to underlying condition, limited to breakdown of skin (H)    Slowing of urinary stream    Nocturia    Muscle pain    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    Diabetic neuropathy associated with type 2 diabetes mellitus (H)    Chronic low back pain    Cellulitis and abscess of foot excluding toe    Diabetic ulcer of toe of right foot associated  with diabetes mellitus due to underlying condition, with necrosis of bone (H)    Osteomyelitis of ankle and foot (H)    Chronic systolic congestive heart failure (H)    Coronary artery disease involving native coronary artery of native heart without angina pectoris      Past Medical History:   Diagnosis Date    Anemia     Arthritis     osteoarthritis knees    BPH     CAD (coronary artery disease)     subtotal occlusion in the small distal LAD     Cardiomyopathy     Cardiomyopathy (H)     Cerebral artery occlusion with cerebral infarction     TIA 1993, no residual    Cervicalgia     Chronic kidney disease     Chronic low back pain     Chronic rhinitis     Chronic rhinitis     Gouty arthropathy     HTN (hypertension)     Hyperlipidemia     Hypertension     Kidney stone     Nocturia     Obesity     PVD (peripheral vascular disease)     Sleep apnea     doesn't use cpap    TIA (transient ischaemic attack) 1993    Type 2 diabetes mellitus - 11/08/2018    Ureteral stone      Past Surgical History:   Procedure Laterality Date    AMPUTATE FOOT Right 8/7/2023    Procedure: AMPUTATION, right hallux;  Surgeon: Nakul Salazar DPM;  Location: [facility] [place] OR    AMPUTATE TOE(S) Left 10/15/2018    Procedure: AMPUTATE TOE(S);  Left third toe amputation;  Surgeon: Ayaka Azevedo DPM, Podiatry/Foot and Ankle Surgery;  Location:  OR    ARTHROPLASTY KNEE Right 12/07/2018    Procedure: Right total knee arthroplasty;  Surgeon: Issa Cunha MD;  Location:  OR    COLONOSCOPY  03/09/2013    Procedure: COLONOSCOPY;  COLONOSCOPY;  Surgeon: Chau Hogan MD;  Location:  GI    CV HEART CATHETERIZATION WITH POSSIBLE INTERVENTION Left 03/05/2019    Procedure: Coronary Angiogram;  Surgeon: Nas Linda MD;  Location:  HEART CARDIAC CATH LAB    Diastasis recti repair  1985    EXTRACAPSULAR CATARACT EXTRATION WITH INTRAOCULAR LENS IMPLANT Left 03/13/2017    EXTRACAPSULAR CATARACT EXTRATION WITH INTRAOCULAR LENS  IMPLANT Right     FOOT SURGERY  2013    cyst removal     HERNIA REPAIR  2004    ventral     ORIF Shoulder Left     Ventral hernia repair NOS  1987     Family History   Problem Relation Age of Onset    Family History Negative Mother          86 yo    Cancer Father          74 yo brain    Diabetes Maternal Grandfather          91 yo    Alcohol/Drug Paternal Grandfather             Colon Cancer No family hx of      Social History     Tobacco Use    Smoking status: Former     Packs/day: 0     Types: Cigarettes     Quit date: 3/17/1993     Years since quittin.7    Smokeless tobacco: Never   Substance Use Topics    Alcohol use: Not Currently              The plan of care was discussed with the patient. They understand and agree with the course of treatment prescribed. A printed summary was given including instructions and medications.  The use of Dragon/[employer] dictation services may have been used to construct the content in this note; any grammatical or spelling errors are non-intentional. Please contact the author of this note directly if you are in need of any clarification.

## 2023-12-08 ENCOUNTER — TRANSFERRED RECORDS (OUTPATIENT)
Dept: HEALTH INFORMATION MANAGEMENT | Facility: CLINIC | Age: 78
End: 2023-12-08
Payer: COMMERCIAL

## 2023-12-22 ENCOUNTER — TRANSFERRED RECORDS (OUTPATIENT)
Dept: HEALTH INFORMATION MANAGEMENT | Facility: CLINIC | Age: 78
End: 2023-12-22
Payer: COMMERCIAL

## 2023-12-26 ENCOUNTER — HOSPITAL ENCOUNTER (EMERGENCY)
Facility: CLINIC | Age: 78
Discharge: HOME | End: 2023-12-26
Attending: EMERGENCY MEDICINE | Admitting: EMERGENCY MEDICINE
Payer: COMMERCIAL

## 2023-12-26 ENCOUNTER — MYC MEDICAL ADVICE (OUTPATIENT)
Dept: FAMILY MEDICINE | Facility: CLINIC | Age: 78
End: 2023-12-26
Payer: COMMERCIAL

## 2023-12-26 ENCOUNTER — NURSE TRIAGE (OUTPATIENT)
Dept: FAMILY MEDICINE | Facility: CLINIC | Age: 78
End: 2023-12-26
Payer: COMMERCIAL

## 2023-12-26 ENCOUNTER — APPOINTMENT (OUTPATIENT)
Dept: ULTRASOUND IMAGING | Facility: CLINIC | Age: 78
End: 2023-12-26
Attending: EMERGENCY MEDICINE
Payer: COMMERCIAL

## 2023-12-26 VITALS
RESPIRATION RATE: 18 BRPM | OXYGEN SATURATION: 98 % | HEIGHT: 72 IN | TEMPERATURE: 98.4 F | SYSTOLIC BLOOD PRESSURE: 103 MMHG | BODY MASS INDEX: 30.61 KG/M2 | DIASTOLIC BLOOD PRESSURE: 58 MMHG | WEIGHT: 226 LBS | HEART RATE: 104 BPM

## 2023-12-26 DIAGNOSIS — M79.661 RIGHT CALF PAIN: ICD-10-CM

## 2023-12-26 PROCEDURE — 99284 EMERGENCY DEPT VISIT MOD MDM: CPT | Mod: 25

## 2023-12-26 PROCEDURE — 93971 EXTREMITY STUDY: CPT | Mod: RT

## 2023-12-26 RX ORDER — NORTRIPTYLINE HCL 10 MG
20 CAPSULE ORAL
COMMUNITY
End: 2024-08-23

## 2023-12-26 NOTE — ED TRIAGE NOTES
"History of lymphaedema to right leg x 1 year.  This am when he got out of bed, had severe pain to right calf. States it feels like his calf is going to \"explode\".  At rest, he has no pain but with ambulation is 7/10.  Denies shortness of breath. History of hypertension, cardiac issues, stroke and diabetes. Called primary and was told to come in      Triage Assessment (Adult)       Row Name 12/26/23 9307          Triage Assessment    Airway WDL WDL        Respiratory WDL    Respiratory WDL WDL                     "

## 2023-12-26 NOTE — TELEPHONE ENCOUNTER
"Nurse Triage SBAR    Is this a 2nd Level Triage? NO    Situation:   See Karthikjacquelynt message, from the patient,  into the clinic on 12/26/23 to report a new right calf pain:    \"Since I got up today, I have been experiencing some very severe pain in my right calf.  It feels like an over-inflated tire - like it's going to explode.  Very sore/tender/painful.  It hurts when I walk.  I have an on-going problem with Lymph Edema. I wear a compression wrap. I have taken several Ibuprofens.  I am also taking Gabapentin, which is primarily for nerve pain and not muscle pain.  If there are any suggestions as to things I can do, please let me know.  If an office visit (Onsite or Video) is warranted, please let me know.\"    Background:   Hx of TIA in 1993, lymphedema, systolic CHF, CAD, DM2 with neuropathy, gout, CKD3b, HTN, and obesity    Assessment:   Patient reports increased pain in his right calf since this morning, 12/26/23    When he woke up this morning and stepped out of bed, patient stated he almost fell due to acute pain in in the back of his right calf    Rates the pain in his right calf as a constant 8 out of ten- feels like the muscle in his right calf is going \"to explode\"     Denies chest pain, trouble breathing, fever, back pain, dizziness, lightheadedness, weakness or numbness on either side of the body, injury or open area associated with his right calf, or headache    Unsure if the right calf is red or hard as he is wearing compression stockings due to hx of lymphedema    Right leg pain is moderately interfering with the patient completing his ADLs     History of TIA in 1993, which caused him to lose feeling in his lower extremities and required extensive PT to help him walk again per patient    Protocol Recommended Disposition:   Go To ED/UCC Now (Or To Office With PCP Approval)    Recommendation:   Gave care advice. Writer recommended that the patient go directly to the ED as per disposition due to possible " "DVT    Patient verbalized understanding and agrees with the plan.    Patient is heading over to the [facility] ED right now    Denies other questions or concerns at this time.    GO TO ED NOW    Does the patient meet one of the following criteria for ADS visit consideration? 16+ years old, with an [facility] PCP     TIP  Providers, please consider if this condition is appropriate for management at one of our Acute and Diagnostic Services sites.     If patient is a good candidate, please use dotphrase <dot>triageresponse and select Refer to ADS to document.     Reason for Disposition   Thigh or calf pain in only one leg and present > 1 hour    Additional Information   Negative: Looks like a broken bone or dislocated joint (e.g., crooked or deformed)   Negative: Sounds like a life-threatening emergency to the triager   Negative: Followed a hip injury   Negative: Followed a knee injury   Negative: Followed an ankle or foot injury   Negative: Back pain radiating (shooting) into leg(s)   Negative: Foot pain is main symptom   Negative: Ankle pain is main symptom   Negative: Knee pain is main symptom   Negative: Leg swelling is main symptom   Negative: Chest pain   Negative: Difficulty breathing   Negative: Entire foot is cool or blue in comparison to other side   Negative: Unable to walk   Negative: Fever and red area (or area very tender to touch)   Negative: Swollen joint and fever    Answer Assessment - Initial Assessment Questions  1. ONSET: \"When did the pain start?\"       Started this morning when the patient got out of bed-almost screamed in pain when he stepped down    2. LOCATION: \"Where is the pain located?\"         Right calf, back of the calf, feels like the muscle is going \"to explode\"     3. PAIN: \"How bad is the pain?\"    (Scale 1-10; or mild, moderate, severe)    -  MILD (1-3): doesn't interfere with normal activities     -  MODERATE (4-7): interferes with normal activities (e.g., work or school) or awakens from " "sleep, limping     -  SEVERE (8-10): excruciating pain, unable to do any normal activities, unable to walk        Rated pain as 8 out of ten pain-did not notice any any redness or lump this morning-moderate    4. WORK OR EXERCISE: \"Has there been any recent work or exercise that involved this part of the body?\"         None noted    5. CAUSE: \"What do you think is causing the leg pain?\"        unknown      6. OTHER SYMPTOMS: \"Do you have any other symptoms?\" (e.g., chest pain, back pain, breathing difficulty, swelling, rash, fever, numbness, weakness)        No chest pain, trouble breathing, back pain, rash, fever, weakness or numbness on either side of the body      7. PREGNANCY: \"Is there any chance you are pregnant?\" \"When was your last menstrual period?\"          Non chance of pregnancy    Protocols used: Leg Pain-A-[place]    Apple Ribeiro RN, BSN  [facility]    "

## 2023-12-27 NOTE — ED PROVIDER NOTES
EMERGENCY DEPARTMENT ENCOUNTER      NAME: Lance Ibrahim  AGE: 78 year old male  YOB: 1945  MRN: [id number]  EVALUATION DATE & TIME: 12/26/2023  7:06 PM    PCP: Rickey Adamson    ED PROVIDER: Darwin Gaytan M.D.      Chief Complaint   Patient presents with    Leg Pain         FINAL IMPRESSION:  1. Right calf pain          ED COURSE & MEDICAL DECISION MAKING:    Pertinent Labs & Imaging studies reviewed below.  All EKGs below represent my independent interpretation.      Patient is a 78-year-old gentleman with history of chronic edema of the right leg who presents with acute pain that began this morning getting out of bed.  It feels painful pressure sensation in the right calf.  History of DVT in the past, but not on anticoagulation now.  He has mild focal tenderness to deep palpation and with dorsiflexion, but no redness or warmth or is tenderness to the skin. Ultrasound did not show evidence of DVT.  I provided reassurance, discussed diagnosis of calf strain.  I recommended primary care follow-up in a couple of weeks for reassessment and repeat ultrasound if symptoms are persistent.    Additional ED Course Timestamps:  7:07 PM I met with the patient to gather history and to perform my initial exam. We discussed plans for the ED course, including diagnostic testing and treatment. We discussed plans for discharge including supportive cares, symptomatic treatment, outpatient follow up, and reasons to return to the emergency department.    Medical Decision Making    History:  Supplemental history from: Documented in chart, if applicable  External Record(s) reviewed: Documented in chart, if applicable.    Work Up:  Chart documentation includes differential considered and any EKGs or imaging independently interpreted by provider, where specified.  In additional to work up documented, I considered the following work up: Documented in chart, if applicable.    External consultation:  Discussion of management  "with another provider: Documented in chart, if applicable    Complicating factors:  Care impacted by chronic illness: Chronic Kidney Disease, Diabetes, Heart Disease, and Hypertension  Care affected by social determinants of health: N/A    Disposition considerations: Discharge. No recommendations on prescription strength medication(s). N/A.    At the conclusion of the encounter I discussed the results of all of the tests and the disposition. The questions were answered. The patient or family acknowledged understanding and was agreeable with the care plan.       MEDICATIONS GIVEN IN THE EMERGENCY:  Medications - No data to display      NEW PRESCRIPTIONS STARTED AT TODAY'S ER VISIT  Discharge Medication List as of 12/26/2023  7:22 PM             =================================================================    HPI    Patient information was obtained from: patient     Use of : N/A       Lance Ibrahim is a 78 year old male with a pertinent history of lymedema, gout, diabetic ulcer of toe on right foot, diabetic neuropathy, CAD, CHF, hypertension, type II diabetes, and CKD,  who presents to this ED for evaluation of leg pain.    Patient reports a past medical history of lymphaedema to the right leg for the past year. Patient reports severe pain to his right calf  this morning upon getting out of bed. Patient states it feels like his calf is going to \"explode.\" At rest, the patient notes no pain, but with ambulation his pain is a +7/10 in severity. He is not presently on antibiotics. Patient was told by his primary care provider to present to the Emergency Department for evaluation today.    Patient denies shortness of breath. No other medical concerns are expressed at this time.          VITALS:  /58   Pulse 104   Temp 98.4  F (36.9  C) (Oral)   Resp 18   Ht 1.829 m (6')   Wt 102.5 kg (226 lb)   SpO2 98%   BMI 30.65 kg/m      PHYSICAL EXAM    Constitutional: Well developed, well nourished. " Comfortable appearing.  HENT: Normocephalic, atraumatic, mucous membranes moist, nose normal. Neck- Supple, gross ROM intact.   Eyes: Pupils mid-range, conjunctiva without injection, no discharge.   Respiratory: Clear to auscultation bilaterally, no respiratory distress, no wheezing, speaks full sentences easily. No cough.  Cardiovascular: Normal heart rate, regular rhythm, no murmurs.   GI: Soft, no tenderness to deep palpation in all quadrants, no masses.  Musculoskeletal: Wearing compression wraps on his right low leg. Mild tenderness to deep palpitation of calf.  Skin: Warm, dry, no rash.  Neurologic: Alert & oriented x 3, cranial nerves grossly intact.  Psychiatric: Affect normal, cooperative.        I, Nancy Mitali Oleary am serving as a scribe to document services personally performed by Dr. Darwin Gaytan based on my observation and the provider's statements to me. I, Darwin Gaytan MD attest that Nancy Oleary is acting in a scribe capacity, has observed my performance of the services and has documented them in accordance with my direction.    Darwin Gaytan M.D.  Emergency Medicine  [facility] EMERGENCY ROOM  [address] [facility] [id number] [phone]  Dept: [phone]       Darwin Gaytan MD  12/26/23 0124

## 2023-12-27 NOTE — DISCHARGE INSTRUCTIONS
Sign of DVT thankfully, if your symptoms are improving within the next 1 to 2 weeks, I would recommend follow-up with primary care doctor for reevaluation.  Sometimes repeat ultrasound is needed to rule out blood clot.

## 2023-12-27 NOTE — ED NOTES
Ok for discharge per MD; pt declining discharge paperwork and left the unit before this writer could speak with him.

## 2024-01-09 ENCOUNTER — TRANSFERRED RECORDS (OUTPATIENT)
Dept: HEALTH INFORMATION MANAGEMENT | Facility: CLINIC | Age: 79
End: 2024-01-09
Payer: COMMERCIAL

## 2024-01-17 ENCOUNTER — LAB REQUISITION (OUTPATIENT)
Dept: LAB | Facility: CLINIC | Age: 79
End: 2024-01-17
Payer: COMMERCIAL

## 2024-01-17 ENCOUNTER — TRANSFERRED RECORDS (OUTPATIENT)
Dept: HEALTH INFORMATION MANAGEMENT | Facility: CLINIC | Age: 79
End: 2024-01-17

## 2024-01-17 DIAGNOSIS — M25.561 PAIN IN RIGHT KNEE: ICD-10-CM

## 2024-01-17 LAB
BASOPHILS # BLD AUTO: 0 10E3/UL (ref 0–0.2)
BASOPHILS NFR BLD AUTO: 0 %
CRP SERPL-MCNC: 5.84 MG/L
EOSINOPHIL # BLD AUTO: 0.2 10E3/UL (ref 0–0.7)
EOSINOPHIL NFR BLD AUTO: 2 %
ERYTHROCYTE [DISTWIDTH] IN BLOOD BY AUTOMATED COUNT: 16.9 % (ref 10–15)
ERYTHROCYTE [SEDIMENTATION RATE] IN BLOOD BY WESTERGREN METHOD: 29 MM/HR (ref 0–20)
HCT VFR BLD AUTO: 39.4 % (ref 40–53)
HGB BLD-MCNC: 12.5 G/DL (ref 13.3–17.7)
IMM GRANULOCYTES # BLD: 0.1 10E3/UL
IMM GRANULOCYTES NFR BLD: 1 %
LYMPHOCYTES # BLD AUTO: 2.4 10E3/UL (ref 0.8–5.3)
LYMPHOCYTES NFR BLD AUTO: 23 %
MCH RBC QN AUTO: 28.4 PG (ref 26.5–33)
MCHC RBC AUTO-ENTMCNC: 31.7 G/DL (ref 31.5–36.5)
MCV RBC AUTO: 90 FL (ref 78–100)
MONOCYTES # BLD AUTO: 1 10E3/UL (ref 0–1.3)
MONOCYTES NFR BLD AUTO: 10 %
NEUTROPHILS # BLD AUTO: 6.6 10E3/UL (ref 1.6–8.3)
NEUTROPHILS NFR BLD AUTO: 64 %
NRBC # BLD AUTO: 0 10E3/UL
NRBC BLD AUTO-RTO: 0 /100
PLATELET # BLD AUTO: 331 10E3/UL (ref 150–450)
RBC # BLD AUTO: 4.4 10E6/UL (ref 4.4–5.9)
WBC # BLD AUTO: 10.3 10E3/UL (ref 4–11)

## 2024-01-17 PROCEDURE — 36415 COLL VENOUS BLD VENIPUNCTURE: CPT | Mod: ORL | Performed by: PHYSICIAN ASSISTANT

## 2024-01-17 PROCEDURE — 86140 C-REACTIVE PROTEIN: CPT | Mod: ORL | Performed by: PHYSICIAN ASSISTANT

## 2024-01-17 PROCEDURE — 85025 COMPLETE CBC W/AUTO DIFF WBC: CPT | Mod: ORL | Performed by: PHYSICIAN ASSISTANT

## 2024-01-17 PROCEDURE — 85652 RBC SED RATE AUTOMATED: CPT | Mod: ORL | Performed by: PHYSICIAN ASSISTANT

## 2024-01-19 ENCOUNTER — TRANSFERRED RECORDS (OUTPATIENT)
Dept: HEALTH INFORMATION MANAGEMENT | Facility: CLINIC | Age: 79
End: 2024-01-19
Payer: COMMERCIAL

## 2024-01-23 ENCOUNTER — TRANSFERRED RECORDS (OUTPATIENT)
Dept: HEALTH INFORMATION MANAGEMENT | Facility: CLINIC | Age: 79
End: 2024-01-23
Payer: COMMERCIAL

## 2024-01-31 ENCOUNTER — TELEPHONE (OUTPATIENT)
Dept: FAMILY MEDICINE | Facility: CLINIC | Age: 79
End: 2024-01-31
Payer: COMMERCIAL

## 2024-01-31 NOTE — TELEPHONE ENCOUNTER
General Call      Reason for Call:  order for pain clinic/hold meds prior to spinal cord stim trial    What are your questions or concerns:  patient is having a spinal cord Stim trail (unset date due to not having hold orders). Clinic has faxed over hold orders multiple times and have not received a fax back to hold orders.   Please fax back orders to hold asprin for 6 days prior to procedure. Fax number on bottom of form. States form was faxed over on 1/25/24

## 2024-01-31 NOTE — TELEPHONE ENCOUNTER
[facility] calling back and stating that they need the form to have the yes or no box checked on if the medication can be held. Please refax completed form, making sure box is checked.

## 2024-02-15 DIAGNOSIS — R06.09 DYSPNEA ON EXERTION: ICD-10-CM

## 2024-02-15 DIAGNOSIS — I42.9 CARDIOMYOPATHY, UNSPECIFIED TYPE (H): ICD-10-CM

## 2024-02-15 DIAGNOSIS — R07.9 CHEST PAIN, UNSPECIFIED TYPE: ICD-10-CM

## 2024-02-15 RX ORDER — ISOSORBIDE MONONITRATE 30 MG/1
30 TABLET, EXTENDED RELEASE ORAL DAILY
Qty: 90 TABLET | Refills: 0 | Status: ON HOLD | OUTPATIENT
Start: 2024-02-15 | End: 2024-05-22

## 2024-02-21 ENCOUNTER — TELEPHONE (OUTPATIENT)
Dept: PHARMACY | Facility: OTHER | Age: 79
End: 2024-02-21
Payer: COMMERCIAL

## 2024-02-21 ENCOUNTER — TRANSFERRED RECORDS (OUTPATIENT)
Dept: HEALTH INFORMATION MANAGEMENT | Facility: CLINIC | Age: 79
End: 2024-02-21
Payer: COMMERCIAL

## 2024-02-21 NOTE — TELEPHONE ENCOUNTER
MTM Recruitment: [facility]  insurance     Referral outreach attempt #1 on February 21, 2024      Outcome: left voicemail and MyChart message sent    Estella PimentelD  Medication Therapy Management Pharmacist   [facility]

## 2024-02-22 NOTE — PLAN OF CARE
"PRIMARY DIAGNOSIS: \"GENERIC\" NURSING  OUTPATIENT/OBSERVATION GOALS TO BE MET BEFORE DISCHARGE:  ADLs back to baseline: No    Activity and level of assistance: NWB Rt LE      Pain status: Pain free.    Return to near baseline physical activity: No     Discharge Planner Nurse   Safe discharge environment identified: Yes  Barriers to discharge: No         Entered by: Swati Meneses RN 08/10/2023 3:46 AM     Please review provider order for any additional goals.   Nurse to notify provider when observation goals have been met and patient is ready for discharge.Goal Outcome Evaluation:                        " Please see this addressed in office visit note from today, 2/22/24.

## 2024-02-27 ENCOUNTER — HOSPITAL ENCOUNTER (OUTPATIENT)
Facility: CLINIC | Age: 79
End: 2024-02-27
Attending: ORTHOPAEDIC SURGERY | Admitting: ORTHOPAEDIC SURGERY
Payer: COMMERCIAL

## 2024-02-27 ENCOUNTER — OFFICE VISIT (OUTPATIENT)
Dept: FAMILY MEDICINE | Facility: CLINIC | Age: 79
End: 2024-02-27
Payer: COMMERCIAL

## 2024-02-27 VITALS
WEIGHT: 239 LBS | BODY MASS INDEX: 32.37 KG/M2 | OXYGEN SATURATION: 97 % | HEIGHT: 72 IN | DIASTOLIC BLOOD PRESSURE: 80 MMHG | HEART RATE: 101 BPM | RESPIRATION RATE: 16 BRPM | TEMPERATURE: 98 F | SYSTOLIC BLOOD PRESSURE: 138 MMHG

## 2024-02-27 DIAGNOSIS — N18.32 DIABETES MELLITUS DUE TO UNDERLYING CONDITION WITH STAGE 3B CHRONIC KIDNEY DISEASE, WITH LONG-TERM CURRENT USE OF INSULIN (H): ICD-10-CM

## 2024-02-27 DIAGNOSIS — E08.22 DIABETES MELLITUS DUE TO UNDERLYING CONDITION WITH STAGE 3B CHRONIC KIDNEY DISEASE, WITH LONG-TERM CURRENT USE OF INSULIN (H): ICD-10-CM

## 2024-02-27 DIAGNOSIS — Z79.4 DIABETES MELLITUS DUE TO UNDERLYING CONDITION WITH STAGE 3B CHRONIC KIDNEY DISEASE, WITH LONG-TERM CURRENT USE OF INSULIN (H): ICD-10-CM

## 2024-02-27 DIAGNOSIS — Z01.818 PREOP GENERAL PHYSICAL EXAM: Primary | ICD-10-CM

## 2024-02-27 LAB
BASOPHILS # BLD AUTO: 0.1 10E3/UL (ref 0–0.2)
BASOPHILS NFR BLD AUTO: 1 %
EOSINOPHIL # BLD AUTO: 0.2 10E3/UL (ref 0–0.7)
EOSINOPHIL NFR BLD AUTO: 2 %
ERYTHROCYTE [DISTWIDTH] IN BLOOD BY AUTOMATED COUNT: 17.7 % (ref 10–15)
HBA1C MFR BLD: 8 % (ref 0–5.6)
HCT VFR BLD AUTO: 39.2 % (ref 40–53)
HGB BLD-MCNC: 12.3 G/DL (ref 13.3–17.7)
IMM GRANULOCYTES # BLD: 0.1 10E3/UL
IMM GRANULOCYTES NFR BLD: 1 %
LYMPHOCYTES # BLD AUTO: 2.1 10E3/UL (ref 0.8–5.3)
LYMPHOCYTES NFR BLD AUTO: 22 %
MCH RBC QN AUTO: 28.9 PG (ref 26.5–33)
MCHC RBC AUTO-ENTMCNC: 31.4 G/DL (ref 31.5–36.5)
MCV RBC AUTO: 92 FL (ref 78–100)
MONOCYTES # BLD AUTO: 0.8 10E3/UL (ref 0–1.3)
MONOCYTES NFR BLD AUTO: 8 %
NEUTROPHILS # BLD AUTO: 6.5 10E3/UL (ref 1.6–8.3)
NEUTROPHILS NFR BLD AUTO: 67 %
PLATELET # BLD AUTO: 234 10E3/UL (ref 150–450)
POTASSIUM SERPL-SCNC: 4.7 MMOL/L (ref 3.4–5.3)
RBC # BLD AUTO: 4.25 10E6/UL (ref 4.4–5.9)
WBC # BLD AUTO: 9.7 10E3/UL (ref 4–11)

## 2024-02-27 PROCEDURE — 99214 OFFICE O/P EST MOD 30 MIN: CPT | Performed by: FAMILY MEDICINE

## 2024-02-27 PROCEDURE — 84132 ASSAY OF SERUM POTASSIUM: CPT | Performed by: FAMILY MEDICINE

## 2024-02-27 PROCEDURE — 83036 HEMOGLOBIN GLYCOSYLATED A1C: CPT | Performed by: FAMILY MEDICINE

## 2024-02-27 PROCEDURE — 85025 COMPLETE CBC W/AUTO DIFF WBC: CPT | Performed by: FAMILY MEDICINE

## 2024-02-27 PROCEDURE — 93005 ELECTROCARDIOGRAM TRACING: CPT | Performed by: FAMILY MEDICINE

## 2024-02-27 PROCEDURE — 36415 COLL VENOUS BLD VENIPUNCTURE: CPT | Performed by: FAMILY MEDICINE

## 2024-02-27 NOTE — PROGRESS NOTES
Preoperative Evaluation  [facility]  [address]  [facility] [address]  Phone: [phone]  Fax: [phone]  Primary Provider: Yemi Adamson  Pre-op Performing Provider: YEMI ADAMSON  Feb 27, 2024       Lance is a 78 year old, presenting for the following:  Pre-Op Exam (Spinal Cord Stimulator// [facility]// Fax [phone])        2/27/2024    10:26 AM   Additional Questions   Roomed by SANDRA Diaz     Surgical Information  Surgery/Procedure: Spinal Cord Stimulator  Surgery Location: [facility]  Surgery Date: 3/8/2024  Surgeon: Leandra  Time of Surgery: unknown  Where patient plans to recover: At home with family  Fax number for surgical facility: [phone]  Assessment & Plan     The proposed surgical procedure is considered LOW risk.    Preop general physical exam  Workup to include  - Hemoglobin A1c; Future  - CBC with Platelets & Differential; Future  - EKG 12-lead, tracing only  - Potassium; Future  - Hemoglobin A1c  - CBC with Platelets & Differential  - Potassium    Diabetes mellitus due to underlying condition with stage 3b chronic kidney disease, with long-term current use of insulin (H)  Workup to include  - Hemoglobin A1c; Future  - Hemoglobin A1c      Possible Sleep Apnea:   Patient is aware of his sleep apnea          - No identified additional risk factors other than previously addressed        Recommendation  APPROVAL GIVEN to proceed with proposed procedure pending review of diagnostic evaluation.          Subjective       HPI related to upcoming procedure: Lance is being scheduled for a two-stage procedure; he is having an implantable pain device [employer] in origin with the first stage first week of March where electrodes will be placed in his lumbar spine to help him with his peripheral neuropathy which has been intractable to medication relief due to his severe diabetes mellitus.  His diabetes is under good control at this time.   Patient does have severe peripheral vascular disease.  Second stage will be 3 weeks later if he gets good relief from the device this will be implanted subcutaneously and has a 10-year life.  Patient understands the procedure he will undergo to anesthesia .  X 2..  Patient has been therapeutically optimized for the anticipated procedure.  All medical questions asked were answered.  I have ordered an EKG preoperatively and appropriate laboratory screening.  We wish Lance and the surgical team well during both procedures        2/20/2024     9:12 AM   Preop Questions   1. Have you ever had a heart attack or stroke? YES -    2. Have you ever had surgery on your heart or blood vessels, such as a stent placement, a coronary artery bypass, or surgery on an artery in your head, neck, heart, or legs? No   3. Do you have chest pain with activity? No   4. Do you have a history of  heart failure? No   5. Do you currently have a cold, bronchitis or symptoms of other infection? No   6. Do you have a cough, shortness of breath, or wheezing? No   7. Do you or anyone in your family have previous history of blood clots? No   8. Do you or does anyone in your family have a serious bleeding problem such as prolonged bleeding following surgeries or cuts? No   9. Have you ever had problems with anemia or been told to take iron pills? YES -    10. Have you had any abnormal blood loss such as black, tarry or bloody stools? No   11. Have you ever had a blood transfusion? No   12. Are you willing to have a blood transfusion if it is medically needed before, during, or after your surgery? Yes   13. Have you or any of your relatives ever had problems with anesthesia? No   14. Do you have sleep apnea, excessive snoring or daytime drowsiness? YES -    14a. Do you have a CPAP machine? No   15. Do you have any artifical heart valves or other implanted medical devices like a pacemaker, defibrillator, or continuous glucose monitor? No   16. Do you  have artificial joints? YES -    17. Are you allergic to latex? No       Health Care Directive  Patient has a Health Care Directive on file      Preoperative Review of             Patient Active Problem List    Diagnosis Date Noted    Type 2 diabetes, HbA1c goal < 7% (H) 02/22/2013     Priority: High    Hypertension 07/06/2008     Priority: High    Transient cerebral ischemia 07/09/2004     Priority: High     Problem list name updated by automated process. Provider to review      Coronary artery disease involving native coronary artery of native heart without angina pectoris 08/10/2023     Priority: Medium    Chronic systolic congestive heart failure (H) 08/08/2023     Priority: Medium    Osteomyelitis of ankle and foot (H) 08/07/2023     Priority: Medium    Cellulitis and abscess of foot excluding toe 07/25/2023     Priority: Medium    Diabetic ulcer of toe of right foot associated with diabetes mellitus due to underlying condition, with necrosis of bone (H) 07/25/2023     Priority: Medium    Slowing of urinary stream 02/01/2023     Priority: Medium    Nocturia 02/01/2023     Priority: Medium    Diabetic ulcer of toe of right foot associated with diabetes mellitus due to underlying condition, limited to breakdown of skin (H) 01/26/2023     Priority: Medium    Muscle pain 12/19/2022     Priority: Medium    Diabetic neuropathy associated with type 2 diabetes mellitus (H) 10/17/2022     Priority: Medium    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 10/14/2022     Priority: Medium    Cellulitis of right lower extremity 08/25/2022     Priority: Medium    H/O amputation of lesser toe, left (H24) 01/26/2022     Priority: Medium    Acute idiopathic gout of right hand 12/27/2021     Priority: Medium    Other constipation 12/09/2021     Priority: Medium    Acute renal insufficiency 12/03/2021     Priority: Medium    Acute right-sided thoracic back pain 12/03/2021     Priority: Medium    Ulcerated, foot, unspecified laterality, limited  to breakdown of skin (H) 07/28/2021     Priority: Medium    Ulcerated, foot (H) 07/27/2020     Priority: Medium    Obesity (BMI 35.0-39.9) with comorbidity (H) 02/05/2020     Priority: Medium    Stage 3b chronic kidney disease (H) 02/05/2020     Priority: Medium    Shortness of breath 03/04/2019     Priority: Medium     Added automatically from request for surgery [id number]      Renal colic 03/04/2019     Priority: Medium    Dyspnea on exertion 02/22/2019     Priority: Medium     Added automatically from request for surgery [id number]      Chest pain, unspecified type 02/22/2019     Priority: Medium     Added automatically from request for surgery [id number]      Cardiomyopathy, unspecified type (H) 02/22/2019     Priority: Medium     Added automatically from request for surgery [id number]      Skin rash 12/08/2018     Priority: Medium    Total knee replacement status 12/07/2018     Priority: Medium    Amputated toe, left (H24) 11/08/2018     Priority: Medium    Cervicalgia 02/24/2017     Priority: Medium    Lower urinary tract symptoms 04/07/2016     Priority: Medium    Type II diabetes mellitus (H) 07/09/2013     Priority: Medium    Gouty arthropathy 07/09/2013     Priority: Medium    CARDIOVASCULAR SCREENING; LDL GOAL LESS THAN 100 10/31/2010     Priority: Medium    Hypertrophy of prostate with urinary obstruction 07/09/2004     Priority: Medium     Problem list name updated by automated process. Provider to review      Ventral hernia 06/28/2004     Priority: Medium     Problem list name updated by automated process. Provider to review      Chronic low back pain 01/01/2000     Priority: Medium    Gout 07/20/2012     Priority: Low    Chronic rhinitis 07/09/2004     Priority: Low      Past Medical History:   Diagnosis Date    Anemia     Arthritis     osteoarthritis knees    BPH     CAD (coronary artery disease)     subtotal occlusion in the small distal LAD     Cardiomyopathy     Cardiomyopathy (H)     Cerebral artery occlusion  with cerebral infarction     TIA 1993, no residual    Cervicalgia     Chronic kidney disease     Chronic low back pain     Chronic rhinitis     Chronic rhinitis     Gouty arthropathy     HTN (hypertension)     Hyperlipidemia     Hypertension     Kidney stone     Nocturia     Obesity     PVD (peripheral vascular disease)     Sleep apnea     doesn't use cpap    TIA (transient ischaemic attack) 1993    Type 2 diabetes mellitus - 11/08/2018    Ureteral stone      Past Surgical History:   Procedure Laterality Date    AMPUTATE FOOT Right 08/07/2023    Procedure: AMPUTATION, right hallux;  Surgeon: Nakul Salazar DPM;  Location: [facility] OR    AMPUTATE TOE(S) Left 10/15/2018    Procedure: AMPUTATE TOE(S);  Left third toe amputation;  Surgeon: Ayaka Azevedo DPM, Podiatry/Foot and Ankle Surgery;  Location:  OR    ARTHROPLASTY KNEE Right 12/07/2018    Procedure: Right total knee arthroplasty;  Surgeon: Issa Cunha MD;  Location:  OR    COLONOSCOPY  03/09/2013    Procedure: COLONOSCOPY;  COLONOSCOPY;  Surgeon: Chau Hogan MD;  Location:  GI    CV HEART CATHETERIZATION WITH POSSIBLE INTERVENTION Left 03/05/2019    Procedure: Coronary Angiogram;  Surgeon: Nas Linda MD;  Location:  HEART CARDIAC CATH LAB    Diastasis recti repair  1985    EXTRACAPSULAR CATARACT EXTRATION WITH INTRAOCULAR LENS IMPLANT Left 03/13/2017    EXTRACAPSULAR CATARACT EXTRATION WITH INTRAOCULAR LENS IMPLANT Right     FOOT SURGERY  04/2013    cyst removal     HERNIA REPAIR  07/13/2004    ventral     ORIF Shoulder Left     urolift  01/15/2024    Ventral hernia repair NOS  1987     Current Outpatient Medications   Medication Sig Dispense Refill    amLODIPine (NORVASC) 5 MG tablet Take 1 tablet (5 mg) by mouth daily 90 tablet 3    aspirin (ASA) 81 MG EC tablet Take by mouth every 24 hours      atorvastatin (LIPITOR) 40 MG tablet TAKE 1 TABLET EVERY 24 HOURS 90 tablet 0    blood glucose monitoring (NO BRAND  "SPECIFIED) meter device kit Use to test blood sugar 2 times daily or as directed. 1 kit 0    calcium carbonate (TUMS) 500 MG chewable tablet Take 1,000 mg by mouth      colchicine (COLCYRS) 0.6 MG tablet TAKE 1 TABLET DAILY 90 tablet 3    CONTOUR NEXT TEST test strip USE TO TEST BLOOD SUGAR 2 TIMES DAILY OR AS DIRECTED. 100 strip 0    Ferrous Gluconate 240 (27 Fe) MG TABS Take 1 tablet by mouth daily       fluticasone (FLONASE) 50 MCG/ACT nasal spray Spray 1-2 sprays in nostril every 24 hours      gabapentin (NEURONTIN) 300 MG capsule Take 1 capsule (300 mg) by mouth 3 times daily 270 capsule 0    isosorbide mononitrate (IMDUR) 30 MG 24 hr tablet TAKE 1 TABLET DAILY 90 tablet 0    lisinopril (ZESTRIL) 10 MG tablet Take 1 tablet (10 mg) by mouth every 24 hours 90 tablet 3    loratadine (CLARITIN) 10 MG tablet Take by mouth every 24 hours      metFORMIN (GLUCOPHAGE) 1000 MG tablet TAKE 1 TABLET TWICE A DAY WITH MEALS 180 tablet 3    Microlet Lancets MISC USE TO TEST BLOOD SUGAR TWICE A DAY OR AS DIRECTED 100 each 1    multivitamin w/minerals (THERA-VIT-M) tablet Take 1 tablet by mouth daily      nortriptyline (PAMELOR) 10 MG capsule Take 10 mg by mouth at bedtime      Probiotic Product (FORTIFY 30 BILLION PROBIOT 50+) CPDR Take 1 5 Pack by mouth daily 90 capsule 1    RYBELSUS 7 MG tablet TAKE 1 TABLET DAILY 90 tablet 3    torsemide (DEMADEX) 20 MG tablet Take 1 tablet (20 mg) by mouth daily 90 tablet 2    finasteride (PROSCAR) 5 MG tablet Take by mouth every 24 hours      tamsulosin (FLOMAX) 0.4 MG 24 hr capsule Take 1 capsule by mouth daily.         Allergies   Allergen Reactions    Penicillins Anaphylaxis     22 - tolerated cefepime     Sulfa Antibiotics      \"itchy rash, swelling of face and hands\"    Ancef [Cefazolin] Rash        Social History     Tobacco Use    Smoking status: Former     Packs/day: 0     Types: Cigarettes     Quit date: 3/17/1993     Years since quittin.9    Smokeless tobacco: Never "   Substance Use Topics    Alcohol use: Not Currently       History   Drug Use No         Review of Systems    Review of Systems  CONSTITUTIONAL: NEGATIVE for fever, chills, change in weight  INTEGUMENTARY/SKIN: NEGATIVE for worrisome rashes, moles or lesions  EYES: NEGATIVE for vision changes or irritation  ENT/MOUTH: NEGATIVE for ear, mouth and throat problems  RESP: NEGATIVE for significant cough or SOB  BREAST: NEGATIVE for masses, tenderness or discharge  CV: NEGATIVE for chest pain, palpitations or peripheral edema  GI: NEGATIVE for nausea, abdominal pain, heartburn, or change in bowel habits  : NEGATIVE for frequency, dysuria, or hematuria  MUSCULOSKELETAL: NEGATIVE for significant arthralgias or myalgia  NEURO: NEGATIVE for weakness, dizziness or paresthesias  ENDOCRINE: NEGATIVE for temperature intolerance, skin/hair changes  HEME: NEGATIVE for bleeding problems  PSYCHIATRIC: NEGATIVE for changes in mood or affect    Objective    There were no vitals taken for this visit.   Estimated body mass index is 30.65 kg/m  as calculated from the following:    Height as of 12/26/23: 1.829 m (6').    Weight as of 12/26/23: 102.5 kg (226 lb).  Physical Exam  GENERAL: alert and no distress  EYES: Eyes grossly normal to inspection, PERRL and conjunctivae and sclerae normal  HENT: ear canals and TM's normal, nose and mouth without ulcers or lesions  NECK: no adenopathy, no asymmetry, masses, or scars  RESP: lungs clear to auscultation - no rales, rhonchi or wheezes  CV: regular rate and rhythm, normal S1 S2, no S3 or S4, no murmur, click or rub, no peripheral edema  ABDOMEN: soft, nontender, no hepatosplenomegaly, no masses and bowel sounds normal  MS: no gross musculoskeletal defects noted, no edema  SKIN: no suspicious lesions or rashes  NEURO: Normal strength and tone, mentation intact and speech normal  PSYCH: mentation appears normal, affect normal/bright    Recent Labs   Lab Test 01/17/24  1552 08/17/23  [id number]  08/15/23  0820 08/08/23  0554 07/19/23  0904 04/27/23  1248   HGB 12.5*  --  10.5*   < > 10.2* 12.5*     --  335   < >  --  273   NA  --  141 137   < >  --  139   POTASSIUM  --  4.5 4.3   < >  --  4.3   CR  --  1.65* 2.06*   < >  --  2.25*   A1C  --   --   --   --  7.3* 7.7*    < > = values in this interval not displayed.        Diagnostics  Labs pending at this time.  Results will be reviewed when available.   EKG: appears normal, NSR, normal axis, normal intervals, no acute ST/T changes c/w ischemia, no LVH by voltage criteria, unchanged from previous tracings    Revised Cardiac Risk Index (RCRI)  The patient has the following serious cardiovascular risks for perioperative complications:   - No serious cardiac risks = 0 points     RCRI Interpretation: 1 point: Class II (low risk - 0.9% complication rate)         Signed Electronically by: Rickey Adamson MD  Copy of this evaluation report is provided to requesting physician.

## 2024-02-28 ENCOUNTER — TELEPHONE (OUTPATIENT)
Dept: PHARMACY | Facility: OTHER | Age: 79
End: 2024-02-28
Payer: COMMERCIAL

## 2024-02-28 LAB
ATRIAL RATE - MUSE: 82 BPM
DIASTOLIC BLOOD PRESSURE - MUSE: NORMAL MMHG
INTERPRETATION ECG - MUSE: NORMAL
P AXIS - MUSE: 42 DEGREES
PR INTERVAL - MUSE: 214 MS
QRS DURATION - MUSE: 124 MS
QT - MUSE: 378 MS
QTC - MUSE: 441 MS
R AXIS - MUSE: -30 DEGREES
SYSTOLIC BLOOD PRESSURE - MUSE: NORMAL MMHG
T AXIS - MUSE: 63 DEGREES
VENTRICULAR RATE- MUSE: 82 BPM

## 2024-02-28 PROCEDURE — 93010 ELECTROCARDIOGRAM REPORT: CPT | Performed by: INTERNAL MEDICINE

## 2024-02-29 ENCOUNTER — TELEPHONE (OUTPATIENT)
Dept: FAMILY MEDICINE | Facility: CLINIC | Age: 79
End: 2024-02-29

## 2024-02-29 NOTE — TELEPHONE ENCOUNTER
Pt dropped off disability parking certificate paperwork to be signed by Dr. Adamson. When complete, please call patient and he will pick the forms up. Forms put in Dr. Adamson's inbox. Thank you!

## 2024-02-29 NOTE — TELEPHONE ENCOUNTER
MTM Recruitment: [facility]  insurance     Referral outreach attempt #2 on February 29, 2024      Outcome: visit scheduled    Cherise Pathak PharmD  Medication Therapy Management Pharmacist

## 2024-03-05 ENCOUNTER — TRANSFERRED RECORDS (OUTPATIENT)
Dept: HEALTH INFORMATION MANAGEMENT | Facility: CLINIC | Age: 79
End: 2024-03-05
Payer: COMMERCIAL

## 2024-03-06 ENCOUNTER — VIRTUAL VISIT (OUTPATIENT)
Dept: PHARMACY | Facility: CLINIC | Age: 79
End: 2024-03-06
Payer: COMMERCIAL

## 2024-03-06 DIAGNOSIS — R09.81 NASAL CONGESTION: ICD-10-CM

## 2024-03-06 DIAGNOSIS — Z78.9 TAKES DIETARY SUPPLEMENTS: ICD-10-CM

## 2024-03-06 DIAGNOSIS — M1A.00X0 IDIOPATHIC CHRONIC GOUT WITHOUT TOPHUS, UNSPECIFIED SITE: ICD-10-CM

## 2024-03-06 DIAGNOSIS — E78.5 HYPERLIPIDEMIA LDL GOAL <70: ICD-10-CM

## 2024-03-06 DIAGNOSIS — N40.0 BENIGN PROSTATIC HYPERPLASIA WITHOUT LOWER URINARY TRACT SYMPTOMS: ICD-10-CM

## 2024-03-06 DIAGNOSIS — K21.00 GASTROESOPHAGEAL REFLUX DISEASE WITH ESOPHAGITIS, UNSPECIFIED WHETHER HEMORRHAGE: ICD-10-CM

## 2024-03-06 DIAGNOSIS — R52 PAIN: ICD-10-CM

## 2024-03-06 DIAGNOSIS — I10 PRIMARY HYPERTENSION: ICD-10-CM

## 2024-03-06 DIAGNOSIS — G45.9 TRANSIENT CEREBRAL ISCHEMIA, UNSPECIFIED TYPE: ICD-10-CM

## 2024-03-06 DIAGNOSIS — I42.9 CARDIOMYOPATHY, UNSPECIFIED TYPE (H): ICD-10-CM

## 2024-03-06 DIAGNOSIS — E11.9 TYPE 2 DIABETES, HBA1C GOAL < 7% (H): Primary | ICD-10-CM

## 2024-03-06 DIAGNOSIS — E11.42 DIABETIC POLYNEUROPATHY ASSOCIATED WITH TYPE 2 DIABETES MELLITUS (H): ICD-10-CM

## 2024-03-06 DIAGNOSIS — E11.40 DIABETIC NEUROPATHY ASSOCIATED WITH TYPE 2 DIABETES MELLITUS (H): ICD-10-CM

## 2024-03-06 DIAGNOSIS — H10.45 CHRONIC ALLERGIC CONJUNCTIVITIS: ICD-10-CM

## 2024-03-06 PROCEDURE — 99607 MTMS BY PHARM ADDL 15 MIN: CPT | Mod: 95

## 2024-03-06 PROCEDURE — 99605 MTMS BY PHARM NP 15 MIN: CPT | Mod: 95

## 2024-03-06 RX ORDER — METFORMIN HCL 500 MG
500 TABLET, EXTENDED RELEASE 24 HR ORAL 2 TIMES DAILY WITH MEALS
Qty: 180 TABLET | Refills: 2 | Status: ON HOLD | OUTPATIENT
Start: 2024-03-06 | End: 2024-04-28

## 2024-03-06 RX ORDER — LIDOCAINE 4 G/G
1 PATCH TOPICAL AT BEDTIME
COMMUNITY
End: 2024-07-24

## 2024-03-06 NOTE — LETTER
_  Medication List        Prepared on: 03/06/2024     Bring your Medication List when you go to the doctor, hospital, or   emergency room. And, share it with your family or caregivers.     Note any changes to how you take your medications.  Cross out medications when you no longer use them.    Medication How I take it Why I use it Prescriber   amLODIPine (NORVASC) 5 MG tablet Take 1 tablet (5 mg) by mouth daily Blood pressure  Rickey Adamson MD   aspirin (ASA) 81 MG EC tablet Take 1 tablet by mouth daily   Clot prevention  Patient Reported   atorvastatin (LIPITOR) 40 MG tablet TAKE 1 TABLET EVERY 24 HOURS Cholesterol  Lc Amaya MD   calcium carbonate (TUMS) 500 MG chewable tablet Take 1,000 mg by mouth daily as needed   Heartburn  Patient Reported   colchicine (COLCYRS) 0.6 MG tablet TAKE 1 TABLET DAILY Gout Terence Hatch MD   diclofenac (VOLTAREN) 1 % topical gel Apply 4 g topically 4 times daily  Pain Patient Reported   Ferrous Gluconate 240 (27 Fe) MG TABS Take 1 tablet by mouth daily  General health  Patient Reported   fluticasone (FLONASE) 50 MCG/ACT nasal spray Spray 1-2 sprays in nostril every 24 hours  Congestion  Patient Reported   gabapentin (NEURONTIN) 300 MG capsule Take 1 capsule (300 mg) by mouth 3 times daily Neuropathy  Rickey Adamson MD   isosorbide mononitrate (IMDUR) 30 MG 24 hr tablet TAKE 1 TABLET DAILY Chest pain  Rickey Adamson MD   Lidocaine (LIDOCARE) 4 % Patch Place 1 patch onto the skin every 24 hours To prevent lidocaine toxicity, patient should be patch free for 12 hrs daily.  Pain Patient Reported   lisinopril (ZESTRIL) 10 MG tablet Take 1 tablet (10 mg) by mouth every 24 hours Blood pressure  Rickey Adamson MD   loratadine (CLARITIN) 10 MG tablet Take 10 mg by mouth daily as needed for allergies Congestion Patient Reported   medical cannabis (Patient's own supply) 5mg at night Sleep Patient Reported   melatonin 5 MG CAPS Take 5 mg by mouth at bedtime Sleep Patient  Reported   metFORMIN (GLUCOPHAGE XR) 500 MG 24 hr tablet Take 1 tablet (500 mg) by mouth 2 times daily (with meals) Type 2 Diabetes Rickey Adamson MD   multivitamin w/minerals (THERA-VIT-M) tablet Take 1 tablet by mouth daily  General Health  Patient Reported   nortriptyline (PAMELOR) 10 MG capsule Take 20 mg by mouth at bedtime  Neuropathy  Rickey Adamson MD   Probiotic Product (FORTIFY 30 BILLION PROBIOT 50+) CPDR Take by mouth daily Abdominal health  Rickey Adamson MD   Semaglutide (RYBELSUS) 14 MG tablet Take 14 mg by mouth daily Type 2 Diabetes Rickey Adamson MD   torsemide (DEMADEX) 20 MG tablet Take 1 tablet (20 mg) by mouth daily Blood pressure  Rickey Adamson MD         Add new medications, over-the-counter drugs, herbals, vitamins, or  minerals in the blank rows below.    Medication How I take it Why I use it Prescriber                                      Allergies:      penicillins; sulfa antibiotics; ancef [cefazolin]        Side effects I have had:               Other Information:              My notes and questions:

## 2024-03-06 NOTE — LETTER
"Recommended To-Do List      Prepared on: 03/06/2024       You can get the best results from your medications by completing the items on this \"To-Do List.\"      Bring your To-Do List when you go to your doctor. And, share it with your family or caregivers.    My To-Do List:  What we talked about: What I should do:   Your medication dosage being too low    Increase your dosage of Rybelsus          What we talked about: What I should do:   Your medication dosage being too high    Decrease your dosage of metFORMIN (GLUCOPHAGE XR)          What we talked about: What I should do:   Needing additional monitoring    Get the following lab test(s): uric acid            What we talked about: What I should do:                     "

## 2024-03-06 NOTE — PROGRESS NOTES
Medication Therapy Management (MTM) Encounter    ASSESSMENT:                            Medication Adherence/Access: No issues identified    Diabetes: Given patient's elevated A1c above goal of <8% recommend patient continue on higher dose of Rybelsus. Patient confirms proper administration of tablet for best efficacy. Additionally would recommend a dose decrease of metformin based on patient's current kidney function. Plan made with patient to continue on higher dose of Rybelsus and lower dose of metformin for 3 months and will re-assess efficacy at next A1c check. If patient's blood sugars remain elevated in 3 months, may consider changing Rybelsus to the injection Ozempic for better absorption and greater efficacy.     Hypertension /CAD: Recent blood pressures at/near goal of <130/80. Recommend discuss with Dr. Adamson at upcoming visit to determine if torsemide therapy is needed to be continued.     Hyperlipidemia /History of TIA: Stable, most recent LDL at goal of <70. See gout section below regarding interaction between atorvastatin and colchicine.     GERD: Stable.     Neuropathy/Pain: Plan in place for patient to obtain spinal cord stimulator and once pain is controlled with stimulator, patient will work to taper off medications as recommended.     BPH: Stable.     Gout: Discussed interaction with colchicine and atorvastatin which is that colchicine can increase concentrations of atorvastatin, at this time patient does not seem to experiencing side effects with atorvastatin thus appropriate to continue on current therapy with monitoring of side effects from atorvastatin. May benefit from obtaining updated uric acid levels at next lab draw.     Allergies/Congestion: Stable.     Supplements: Stable, most recent hemoglobin low but stable compared to patient's previous results.     PLAN:                            Reduce metformin to 1/2 tablet (500mg) twice daily, I will send in a new prescription for the 500mg  tablet for you to take 1 tablet twice daily Or 2 tablets daily once you use up your current supply.     Continue on Rybelsus 14mg daily for an additional 3 months     Obtain updated uric acid levels at next lab draw.     Follow-up: Return in about 3 months (around 2024).    SUBJECTIVE/OBJECTIVE:                          Lance Ibrahim is a 78 year old male contacted via secure video for an initial visit. He was referred to me from their [facility].      Reason for visit: Comprehensive medication review.    Allergies/ADRs: Reviewed in chart  Past Medical History: Reviewed in chart  Tobacco: He reports that he quit smoking about 30 years ago. His smoking use included cigarettes. He has never used smokeless tobacco.  Alcohol: not currently using.    Medication Adherence/Access: Patient uses pill box(es).  Patient takes medications 2 time(s) per day.   Per patient, misses medication 0 times per week.   Medication barriers: none.     Diabetes   Rybelsus 7mg daily - was recommended to increase dose on 3/1. Patient notes that he is now taking 14mg daily as of 3/1, patient notes that he is taking two 7mg tablets currently in the morning to use up current supply. Patient was having stomach upset when first starting on Rybelsus but since starting probiotic patient has not had these symptoms even with dose increase.   Metformin 1,000mg twice daily   Blood sugar monitorin time(s) daily; Ranges: (patient reported) Fasting- 142 this morning, ranges from 132-150s.   Current diabetes symptoms: none     Eye exam is up to date  Foot exam: due  Urine Albumin:   Lab Results   Component Value Date    UMALCR 33.33 (H) 2021      Lab Results   Component Value Date    A1C 8.0 (H) 2024     GFR Estimate   Date Value Ref Range Status   2023 42 (L) >60 mL/min/1.73m2 Final   08/15/2023 32 (L) >60 mL/min/1.73m2 Final   08/10/2023 46 (L) >60 mL/min/1.73m2 Final   2021 59 (L) >60 mL/min/[1.73_m2] Final     Comment:     Non   GFR Calc  Starting 12/18/2018, serum creatinine based estimated GFR (eGFR) will be   calculated using the Chronic Kidney Disease Epidemiology Collaboration   (CKD-EPI) equation.     07/28/2020 59 (L) >60 mL/min/[1.73_m2] Final     Comment:     Non  GFR Calc  Starting 12/18/2018, serum creatinine based estimated GFR (eGFR) will be   calculated using the Chronic Kidney Disease Epidemiology Collaboration   (CKD-EPI) equation.     07/27/2020 53 (L) >60 mL/min/[1.73_m2] Final     Comment:     Non  GFR Calc  Starting 12/18/2018, serum creatinine based estimated GFR (eGFR) will be   calculated using the Chronic Kidney Disease Epidemiology Collaboration   (CKD-EPI) equation.       GFR, ESTIMATED POCT   Date Value Ref Range Status   12/03/2021 15 (L) >60 mL/min/1.73m2 Final     Hypertension /CAD:  Isosorbide mononitrate 30mg daily   Torsemide 20mg daily - was originally prescribed to help with edema, notes that he is not sure how effective this has been to help with edema as he notes that his leg wraps are more effective to reduce swelling.   Amlodipine 5mg daily   Lisinopril 10mg daily   Aspirin 81mg daily   Patient declines any recent chest pain symptoms.   Patient reports no current medication side effects  Patient does not self-monitor blood pressure.       BP Readings from Last 3 Encounters:   02/27/24 138/80   12/26/23 103/58   12/04/23 122/67     Pulse Readings from Last 3 Encounters:   02/27/24 101   12/26/23 104   12/04/23 85     Hyperlipidemia /History of TIA:  atorvastatin 40mg daily  Patient reports no significant myalgias or other side effects.  The ASCVD Risk score (Cy JAY, et al., 2019) failed to calculate for the following reasons:    The patient has a prior MI or stroke diagnosis     Recent Labs   Lab Test 04/27/23  1248 06/16/22  0934   CHOL 108 144   HDL 52 63   LDL 39 67   TRIG 87 69     GERD:   Tums 500 mg once daily as needed - patient notes no  symptoms recently thus no recent use.   Patient reports no current symptoms.     Neuropathy/Pain:   Gabapentin 900mg at bedtime  Nortriptyline 20mg nightly    CBD gummy every night   Salon paw lidocaine 4% patch to feet every night, patient confirms a 12 hour off period where he is not wearing the patch   Melatonin 5mg nightly   Diclofenac 1% topical gel as needed - patient notes that he applies to knee to help with knee pain. Patient notes that use is infrequent and typically will use when his steroid injection is wearing off.   Patient notes that sleep has been very uncontrolled at this time due to pain, he has an upcoming surgery planned to get spinal cord stimulator to help with pain, he hopes that he can taper off pain medications once he is able to get the stimulator implanted.     BPH:  Patient notes that as of January 23rd he had a prostate procedure thus no longer needs to take tamsulosin and finasteride after this procedure. He notes that symptoms are controlled at this time.     Gout:   colchicine 0.6mg daily   Patient reports no current pain concerns, notes that colchicine is effective most of the time. He notes that pain usually occurs in his hands, when he experiences increased pain he uses thermal gloves which helps with the pain.   Uric Acid   Date Value Ref Range Status   12/07/2021 5.7 3.5 - 7.2 mg/dL Final   11/28/2019 10.8 (H) 3.5 - 7.2 mg/dL Final     Allergies/Congestion:  Loratadine 10mg daily as needed for congestion. Patient has not needed to take in 4-5 months but finds this to be effective when needed.   Flonase 1-2 sprays into each nostril daily - patient finds this to be effective     Supplements:  Ferrous gluconate 1 tablet daily - patient notes that he takes because he regularly donates blood and was found to have lower hemoglobin levels in the past.   Multivitamin 1 tablet daily   Probiotic 1 tablet daily - was recommended to start taking once starting on Rybelsus   CBC RESULTS:    Recent Labs   Lab Test 02/27/24  1113   WBC 9.7   RBC 4.25*   HGB 12.3*   HCT 39.2*   MCV 92   MCH 28.9   MCHC 31.4*   RDW 17.7*        Today's Vitals: There were no vitals taken for this visit.  ----------------  I spent 55 minutes with this patient today. All changes were made via collaborative practice agreement with Rickey Adamson MD. A copy of the visit note was provided to the patient's provider(s).    A summary of these recommendations was sent via [employer].    Nydia Rivers, PharmD  Medication Therapy Management Pharmacist   [facility]     Telemedicine Visit Details  Type of service:  Telephone visit  Start Time:  1:02 PM  End Time: 1:57 PM     Medication Therapy Recommendations  Diabetic neuropathy associated with type 2 diabetes mellitus (H)    Current Medication: RYBELSUS 7 MG tablet (Discontinued)   Rationale: Dose too low - Dosage too low - Effectiveness   Recommendation: Increase Dose   Status: Accepted per CPA          Current Medication: metFORMIN (GLUCOPHAGE XR) 500 MG 24 hr tablet   Rationale: Dose too high - Dosage too high - Safety   Recommendation: Decrease Dose   Status: Accepted per CPA         Gout    Current Medication: colchicine (COLCYRS) 0.6 MG tablet   Rationale: Medication requires monitoring - Needs additional monitoring   Recommendation: Order Lab   Status: Accepted per CPA

## 2024-03-06 NOTE — LETTER
March 6, 2024  Lance Ibrahim  [address] [place] [address] [address]  [facility] [address]    Dear ANDREW Mcel[facility]     Thank you for talking with me on Mar 6, 2024 about your health and medications. As a follow-up to our conversation, I have included two documents:      Your Recommended To-Do List has steps you should take to get the best results from your medications.  Your Medication List will help you keep track of your medications and how to take them.    If you want to talk about these documents, please call Michael Rivers RPH at phone: [phone], Monday-Friday 8-4:30pm.    I look forward to working with you and your doctors to make sure your medications work well for you.    Sincerely,  Michael Rivers RPH  [facility] Pharmacist, [facility]

## 2024-03-07 NOTE — PATIENT INSTRUCTIONS
"Recommendations from today's MTM visit:                                                      Reduce metformin to 1/2 tablet (500mg) twice daily, I will send in a new prescription for the 500mg tablet for you to take 1 tablet twice daily Or 2 tablets daily once you use up your current supply.     Continue on Rybelsus 14mg daily for an additional 3 months     Obtain updated uric acid levels at next lab draw.     Follow-up: Return in about 3 months (around 6/6/2024).    It was great speaking with you today.  I value your experience and would be very thankful for your time in providing feedback in our clinic survey. In the next few days, you may receive an email or text message from [facility] [employer] with a link to a survey related to your  clinical pharmacist.\"     To schedule another MTM appointment, please call the clinic directly or you may call the MTM scheduling line at [phone].    My Clinical Pharmacist's contact information:                                                      Please feel free to contact me with any questions or concerns you have.      Nydia Rivers, PharmD  Medication Therapy Management Pharmacist   [facility]    "

## 2024-03-12 ENCOUNTER — MYC MEDICAL ADVICE (OUTPATIENT)
Dept: VASCULAR SURGERY | Facility: CLINIC | Age: 79
End: 2024-03-12
Payer: COMMERCIAL

## 2024-03-27 ENCOUNTER — OFFICE VISIT (OUTPATIENT)
Dept: FAMILY MEDICINE | Facility: CLINIC | Age: 79
End: 2024-03-27
Payer: COMMERCIAL

## 2024-03-27 VITALS
BODY MASS INDEX: 31.29 KG/M2 | RESPIRATION RATE: 16 BRPM | HEIGHT: 72 IN | DIASTOLIC BLOOD PRESSURE: 80 MMHG | SYSTOLIC BLOOD PRESSURE: 130 MMHG | OXYGEN SATURATION: 98 % | TEMPERATURE: 98.5 F | WEIGHT: 231 LBS | HEART RATE: 72 BPM

## 2024-03-27 DIAGNOSIS — M1A.00X0 IDIOPATHIC CHRONIC GOUT WITHOUT TOPHUS, UNSPECIFIED SITE: ICD-10-CM

## 2024-03-27 DIAGNOSIS — Z01.818 PREOP GENERAL PHYSICAL EXAM: Primary | ICD-10-CM

## 2024-03-27 LAB
ERYTHROCYTE [DISTWIDTH] IN BLOOD BY AUTOMATED COUNT: 16.5 % (ref 10–15)
HCT VFR BLD AUTO: 41 % (ref 40–53)
HGB BLD-MCNC: 12.9 G/DL (ref 13.3–17.7)
MCH RBC QN AUTO: 29.2 PG (ref 26.5–33)
MCHC RBC AUTO-ENTMCNC: 31.5 G/DL (ref 31.5–36.5)
MCV RBC AUTO: 93 FL (ref 78–100)
PLATELET # BLD AUTO: 294 10E3/UL (ref 150–450)
RBC # BLD AUTO: 4.42 10E6/UL (ref 4.4–5.9)
WBC # BLD AUTO: 7.4 10E3/UL (ref 4–11)

## 2024-03-27 PROCEDURE — 84132 ASSAY OF SERUM POTASSIUM: CPT | Performed by: FAMILY MEDICINE

## 2024-03-27 PROCEDURE — 36415 COLL VENOUS BLD VENIPUNCTURE: CPT | Performed by: FAMILY MEDICINE

## 2024-03-27 PROCEDURE — 85027 COMPLETE CBC AUTOMATED: CPT | Performed by: FAMILY MEDICINE

## 2024-03-27 PROCEDURE — 84550 ASSAY OF BLOOD/URIC ACID: CPT | Performed by: FAMILY MEDICINE

## 2024-03-27 PROCEDURE — 99214 OFFICE O/P EST MOD 30 MIN: CPT | Performed by: FAMILY MEDICINE

## 2024-03-27 RX ORDER — RESPIRATORY SYNCYTIAL VIRUS VACCINE 120MCG/0.5
0.5 KIT INTRAMUSCULAR ONCE
Qty: 1 EACH | Refills: 0 | Status: CANCELLED | OUTPATIENT
Start: 2024-03-27 | End: 2024-03-27

## 2024-03-27 ASSESSMENT — PAIN SCALES - GENERAL: PAINLEVEL: NO PAIN (0)

## 2024-03-27 NOTE — PROGRESS NOTES
Preoperative Evaluation  [facility]  [address]  [facility] [address]  Phone: [phone]  Fax: [phone]  Primary Provider: Yemi Adamson  Pre-op Performing Provider: YEMI ADAMSON  Mar 27, 2024       Lance is a 78 year old, presenting for the following:  Pre-Op Exam#1 (4/3/24 - SES (stimulator), Dr. Martin, [facility] fax number - [phone])        3/27/2024     1:02 PM   Additional Questions   Roomed by      Surgical Information  Surgery/Procedure: SES  Surgery Location: [facility]  Surgeon: Dr. Martin  Surgery Date: 4/3/24  Time of Surgery: TBD  Where patient plans to recover: At home with family  Fax number for surgical facility: [phone]    Assessment & Plan     The proposed surgical procedure is considered LOW risk.    Preop general physical exam  Workup to include the following  - CBC with platelets; Future  - Potassium; Future               - No identified additional risk factors other than previously addressed    Antiplatelet or Anticoagulation Medication Instructions   - aspirin: Discontinue aspirin 7-10 days prior to procedure to reduce bleeding risk. It should be resumed postoperatively.     Additional Medication Instructions patient will take his blood pressure medication the morning of surgery and one half of the metformin; he has been instructed to take the rest of his medication once he gets home and is taking nourishment; he is on a 7-day hold of his baby aspirin      Recommendation  APPROVAL GIVEN to proceed with proposed procedure, without further diagnostic evaluation.          Subjective       HPI related to upcoming procedure: Lance is a delightful 78-year-old who is being admitted for insertion of a Medtr[employer] pain stimulator at the L2 area following a 7-week trial of electrode external investigation.  He reports that once dialed in he got excellent pain relief of his lower back pain and peripheral neuropathy pain secondary to his  diabetes and his anxious to have the permanent stimulator placed.  He has been well schooled and instructed in what to expect from the surgery.  Postoperative pain management will be by neurosurgery if necessary.  He has been instructed in what medications to take and when to take them here in primary care.  He has been therapeutically optimized for the anticipated procedure.  We will do a basic hemogram platelet study and potassium.  We wish him well.        3/20/2024    10:03 AM   Preop Questions   1. Have you ever had a heart attack or stroke? YES -    2. Have you ever had surgery on your heart or blood vessels, such as a stent placement, a coronary artery bypass, or surgery on an artery in your head, neck, heart, or legs? No   3. Do you have chest pain with activity? No   4. Do you have a history of  heart failure? No   5. Do you currently have a cold, bronchitis or symptoms of other infection? No   6. Do you have a cough, shortness of breath, or wheezing? No   7. Do you or anyone in your family have previous history of blood clots? YES -    8. Do you or does anyone in your family have a serious bleeding problem such as prolonged bleeding following surgeries or cuts? No   9. Have you ever had problems with anemia or been told to take iron pills? YES -    10. Have you had any abnormal blood loss such as black, tarry or bloody stools? No   11. Have you ever had a blood transfusion? No   12. Are you willing to have a blood transfusion if it is medically needed before, during, or after your surgery? Yes   13. Have you or any of your relatives ever had problems with anesthesia? No   14. Do you have sleep apnea, excessive snoring or daytime drowsiness? No   15. Do you have any artifical heart valves or other implanted medical devices like a pacemaker, defibrillator, or continuous glucose monitor? No   16. Do you have artificial joints? YES -    17. Are you allergic to latex? No       Health Care Directive  Patient has  a Health Care Directive on file      Preoperative Review of    reviewed - no record of controlled substances prescribed.          Patient Active Problem List    Diagnosis Date Noted    Type 2 diabetes, HbA1c goal < 7% (H) 02/22/2013     Priority: High    Hypertension 07/06/2008     Priority: High    Transient cerebral ischemia 07/09/2004     Priority: High     Problem list name updated by automated process. Provider to review      Coronary artery disease involving native coronary artery of native heart without angina pectoris 08/10/2023     Priority: Medium    Chronic systolic congestive heart failure (H) 08/08/2023     Priority: Medium    Osteomyelitis of ankle and foot (H) 08/07/2023     Priority: Medium    Cellulitis and abscess of foot excluding toe 07/25/2023     Priority: Medium    Diabetic ulcer of toe of right foot associated with diabetes mellitus due to underlying condition, with necrosis of bone (H) 07/25/2023     Priority: Medium    Slowing of urinary stream 02/01/2023     Priority: Medium    Nocturia 02/01/2023     Priority: Medium    Diabetic ulcer of toe of right foot associated with diabetes mellitus due to underlying condition, limited to breakdown of skin (H) 01/26/2023     Priority: Medium    Muscle pain 12/19/2022     Priority: Medium    Diabetic neuropathy associated with type 2 diabetes mellitus (H) 10/17/2022     Priority: Medium    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 10/14/2022     Priority: Medium    Cellulitis of right lower extremity 08/25/2022     Priority: Medium    H/O amputation of lesser toe, left (H24) 01/26/2022     Priority: Medium    Acute idiopathic gout of right hand 12/27/2021     Priority: Medium    Other constipation 12/09/2021     Priority: Medium    Acute renal insufficiency 12/03/2021     Priority: Medium    Acute right-sided thoracic back pain 12/03/2021     Priority: Medium    Ulcerated, foot, unspecified laterality, limited to breakdown of skin (H) 07/28/2021      Priority: Medium    Ulcerated, foot (H) 07/27/2020     Priority: Medium    Obesity (BMI 35.0-39.9) with comorbidity (H) 02/05/2020     Priority: Medium    Stage 3b chronic kidney disease (H) 02/05/2020     Priority: Medium    Shortness of breath 03/04/2019     Priority: Medium     Added automatically from request for surgery [id number]      Renal colic 03/04/2019     Priority: Medium    Dyspnea on exertion 02/22/2019     Priority: Medium     Added automatically from request for surgery [id number]      Chest pain, unspecified type 02/22/2019     Priority: Medium     Added automatically from request for surgery [id number]      Cardiomyopathy, unspecified type (H) 02/22/2019     Priority: Medium     Added automatically from request for surgery [id number]      Skin rash 12/08/2018     Priority: Medium    Total knee replacement status 12/07/2018     Priority: Medium    Amputated toe, left (H24) 11/08/2018     Priority: Medium    Cervicalgia 02/24/2017     Priority: Medium    Lower urinary tract symptoms 04/07/2016     Priority: Medium    Type II diabetes mellitus (H) 07/09/2013     Priority: Medium    Gouty arthropathy 07/09/2013     Priority: Medium    CARDIOVASCULAR SCREENING; LDL GOAL LESS THAN 100 10/31/2010     Priority: Medium    Hypertrophy of prostate with urinary obstruction 07/09/2004     Priority: Medium     Problem list name updated by automated process. Provider to review      Ventral hernia 06/28/2004     Priority: Medium     Problem list name updated by automated process. Provider to review      Chronic low back pain 01/01/2000     Priority: Medium    Gout 07/20/2012     Priority: Low    Chronic rhinitis 07/09/2004     Priority: Low      Past Medical History:   Diagnosis Date    Anemia     Arthritis     osteoarthritis knees    BPH     CAD (coronary artery disease)     subtotal occlusion in the small distal LAD     Cardiomyopathy     Cardiomyopathy (H)     Cerebral artery occlusion with cerebral infarction     TIA 1993, no  residual    Cervicalgia     Chronic kidney disease     Chronic low back pain     Chronic rhinitis     Chronic rhinitis     Gouty arthropathy     HTN (hypertension)     Hyperlipidemia     Hypertension     Kidney stone     Nocturia     Obesity     PVD (peripheral vascular disease)     Sleep apnea     doesn't use cpap    TIA (transient ischaemic attack) 1993    Type 2 diabetes mellitus - 11/08/2018    Ureteral stone      Past Surgical History:   Procedure Laterality Date    AMPUTATE FOOT Right 08/07/2023    Procedure: AMPUTATION, right hallux;  Surgeon: Nakul Salazar DPM;  Location: [facility] Main OR    AMPUTATE TOE(S) Left 10/15/2018    Procedure: AMPUTATE TOE(S);  Left third toe amputation;  Surgeon: Ayaka Azevedo DPM, Podiatry/Foot and Ankle Surgery;  Location:  OR    ARTHROPLASTY KNEE Right 12/07/2018    Procedure: Right total knee arthroplasty;  Surgeon: Issa Cunha MD;  Location:  OR    COLONOSCOPY  03/09/2013    Procedure: COLONOSCOPY;  COLONOSCOPY;  Surgeon: Chau Hogan MD;  Location:  GI    CV HEART CATHETERIZATION WITH POSSIBLE INTERVENTION Left 03/05/2019    Procedure: Coronary Angiogram;  Surgeon: Nas Linda MD;  Location:  HEART CARDIAC CATH LAB    Diastasis recti repair  1985    EXTRACAPSULAR CATARACT EXTRATION WITH INTRAOCULAR LENS IMPLANT Left 03/13/2017    EXTRACAPSULAR CATARACT EXTRATION WITH INTRAOCULAR LENS IMPLANT Right     FOOT SURGERY  04/2013    cyst removal     HERNIA REPAIR  07/13/2004    ventral     ORIF Shoulder Left     urolift  01/15/2024    Ventral hernia repair NOS  1987     Current Outpatient Medications   Medication Sig Dispense Refill    amLODIPine (NORVASC) 5 MG tablet Take 1 tablet (5 mg) by mouth daily 90 tablet 3    aspirin (ASA) 81 MG EC tablet Take by mouth every 24 hours      atorvastatin (LIPITOR) 40 MG tablet TAKE 1 TABLET EVERY 24 HOURS 90 tablet 0    blood glucose monitoring (NO BRAND SPECIFIED) meter device kit Use to test blood sugar  "2 times daily or as directed. 1 kit 0    calcium carbonate (TUMS) 500 MG chewable tablet Take 1,000 mg by mouth      colchicine (COLCYRS) 0.6 MG tablet TAKE 1 TABLET DAILY 90 tablet 3    CONTOUR NEXT TEST test strip USE TO TEST BLOOD SUGAR 2 TIMES DAILY OR AS DIRECTED. 100 strip 0    diclofenac (VOLTAREN) 1 % topical gel Apply 4 g topically 4 times daily      Ferrous Gluconate 240 (27 Fe) MG TABS Take 1 tablet by mouth daily       fluticasone (FLONASE) 50 MCG/ACT nasal spray Spray 1-2 sprays in nostril every 24 hours      gabapentin (NEURONTIN) 300 MG capsule Take 1 capsule (300 mg) by mouth 3 times daily 270 capsule 0    isosorbide mononitrate (IMDUR) 30 MG 24 hr tablet TAKE 1 TABLET DAILY 90 tablet 0    Lidocaine (LIDOCARE) 4 % Patch Place 1 patch onto the skin every 24 hours To prevent lidocaine toxicity, patient should be patch free for 12 hrs daily.      lisinopril (ZESTRIL) 10 MG tablet Take 1 tablet (10 mg) by mouth every 24 hours 90 tablet 3    loratadine (CLARITIN) 10 MG tablet Take 10 mg by mouth daily as needed for allergies      medical cannabis (Patient's own supply) 5mg at night      melatonin 5 MG CAPS Take 5 mg by mouth at bedtime      metFORMIN (GLUCOPHAGE XR) 500 MG 24 hr tablet Take 1 tablet (500 mg) by mouth 2 times daily (with meals) 180 tablet 2    Microlet Lancets MISC USE TO TEST BLOOD SUGAR TWICE A DAY OR AS DIRECTED 100 each 1    multivitamin w/minerals (THERA-VIT-M) tablet Take 1 tablet by mouth daily      nortriptyline (PAMELOR) 10 MG capsule Take 20 mg by mouth at bedtime      Probiotic Product (FORTIFY 30 BILLION PROBIOT 50+) CPDR Take 1 5 Pack by mouth daily 90 capsule 1    Semaglutide (RYBELSUS) 14 MG tablet Take 14 mg by mouth daily 30 tablet 3    torsemide (DEMADEX) 20 MG tablet Take 1 tablet (20 mg) by mouth daily 90 tablet 2       Allergies   Allergen Reactions    Penicillins Anaphylaxis     8/25/22 - tolerated cefepime     Sulfa Antibiotics      \"itchy rash, swelling of face and " "hands\"    Ancef [Cefazolin] Rash        Social History     Tobacco Use    Smoking status: Former     Packs/day: 0     Types: Cigarettes     Quit date: 3/17/1993     Years since quittin.0    Smokeless tobacco: Never   Substance Use Topics    Alcohol use: Not Currently       History   Drug Use No         Review of Systems    Review of Systems  CONSTITUTIONAL: NEGATIVE for fever, chills, change in weight  INTEGUMENTARY/SKIN: NEGATIVE for worrisome rashes, moles or lesions  EYES: NEGATIVE for vision changes or irritation  ENT/MOUTH: NEGATIVE for ear, mouth and throat problems  RESP: NEGATIVE for significant cough or SOB  BREAST: NEGATIVE for masses, tenderness or discharge  CV: NEGATIVE for chest pain, palpitations or peripheral edema  GI: NEGATIVE for nausea, abdominal pain, heartburn, or change in bowel habits  : NEGATIVE for frequency, dysuria, or hematuria  MUSCULOSKELETAL: NEGATIVE for significant arthralgias or myalgia  NEURO: NEGATIVE for weakness, dizziness or paresthesias  ENDOCRINE: NEGATIVE for temperature intolerance, skin/hair changes  HEME: NEGATIVE for bleeding problems  PSYCHIATRIC: NEGATIVE for changes in mood or affect    Objective    /80   Pulse 72   Temp 98.5  F (36.9  C)   Resp 16   Ht 1.829 m (6')   Wt 104.8 kg (231 lb)   SpO2 98%   BMI 31.33 kg/m     Estimated body mass index is 31.33 kg/m  as calculated from the following:    Height as of this encounter: 1.829 m (6').    Weight as of this encounter: 104.8 kg (231 lb).  Physical Exam  GENERAL: alert and no distress  EYES: Eyes grossly normal to inspection, PERRL and conjunctivae and sclerae normal  HENT: ear canals and TM's normal, nose and mouth without ulcers or lesions  NECK: no adenopathy, no asymmetry, masses, or scars  RESP: lungs clear to auscultation - no rales, rhonchi or wheezes  CV: regular rate and rhythm, normal S1 S2, no S3 or S4, no murmur, click or rub, no peripheral edema  ABDOMEN: soft, nontender, no " hepatosplenomegaly, no masses and bowel sounds normal  MS: no gross musculoskeletal defects noted, no edema  SKIN: no suspicious lesions or rashes  NEURO: Normal strength and tone, mentation intact and speech normal  PSYCH: mentation appears normal, affect normal/bright    Recent Labs   Lab Test 02/27/24  1113 01/17/24  1552 08/17/23  0852 08/15/23  0820 08/08/23  0554 07/19/23  0904   HGB 12.3* 12.5*  --  10.5*   < > 10.2*    331  --  335   < >  --    NA  --   --  141 137   < >  --    POTASSIUM 4.7  --  4.5 4.3   < >  --    CR  --   --  1.65* 2.06*   < >  --    A1C 8.0*  --   --   --   --  7.3*    < > = values in this interval not displayed.        Diagnostics  Labs pending at this time.  Results will be reviewed when available.   No EKG this visit, completed in the last 90 days.    Revised Cardiac Risk Index (RCRI)  The patient has the following serious cardiovascular risks for perioperative complications:   - No serious cardiac risks = 0 points     RCRI Interpretation: 1 point: Class II (low risk - 0.9% complication rate)         Signed Electronically by: Rickey Adamson MD  Copy of this evaluation report is provided to requesting physician.

## 2024-03-28 ENCOUNTER — PATIENT OUTREACH (OUTPATIENT)
Dept: CARE COORDINATION | Facility: CLINIC | Age: 79
End: 2024-03-28
Payer: COMMERCIAL

## 2024-03-28 LAB
POTASSIUM SERPL-SCNC: 4.9 MMOL/L (ref 3.4–5.3)
URATE SERPL-MCNC: 10.9 MG/DL (ref 3.4–7)

## 2024-03-29 ENCOUNTER — TRANSFERRED RECORDS (OUTPATIENT)
Dept: HEALTH INFORMATION MANAGEMENT | Facility: CLINIC | Age: 79
End: 2024-03-29
Payer: COMMERCIAL

## 2024-03-29 LAB — RETINOPATHY: NEGATIVE

## 2024-04-02 DIAGNOSIS — I10 ESSENTIAL (PRIMARY) HYPERTENSION: ICD-10-CM

## 2024-04-03 RX ORDER — AMLODIPINE BESYLATE 5 MG/1
5 TABLET ORAL DAILY
Qty: 90 TABLET | Refills: 3 | Status: ON HOLD | OUTPATIENT
Start: 2024-04-03 | End: 2024-05-22

## 2024-04-07 ENCOUNTER — MYC REFILL (OUTPATIENT)
Dept: FAMILY MEDICINE | Facility: CLINIC | Age: 79
End: 2024-04-07
Payer: COMMERCIAL

## 2024-04-07 DIAGNOSIS — I10 ESSENTIAL HYPERTENSION: ICD-10-CM

## 2024-04-07 DIAGNOSIS — R06.09 DYSPNEA ON EXERTION: ICD-10-CM

## 2024-04-07 DIAGNOSIS — I42.9 CARDIOMYOPATHY, UNSPECIFIED TYPE (H): ICD-10-CM

## 2024-04-07 DIAGNOSIS — R07.9 CHEST PAIN, UNSPECIFIED TYPE: ICD-10-CM

## 2024-04-08 RX ORDER — TORSEMIDE 20 MG/1
20 TABLET ORAL DAILY
Qty: 90 TABLET | Refills: 3 | Status: ON HOLD | OUTPATIENT
Start: 2024-04-08 | End: 2024-08-16

## 2024-04-08 RX ORDER — ISOSORBIDE MONONITRATE 30 MG/1
30 TABLET, EXTENDED RELEASE ORAL DAILY
Qty: 90 TABLET | Refills: 0 | OUTPATIENT
Start: 2024-04-08

## 2024-04-11 ENCOUNTER — PATIENT OUTREACH (OUTPATIENT)
Dept: CARE COORDINATION | Facility: CLINIC | Age: 79
End: 2024-04-11
Payer: COMMERCIAL

## 2024-04-16 PROBLEM — N40.0 ENLARGED PROSTATE: Status: ACTIVE | Noted: 2024-04-16

## 2024-04-16 RX ORDER — FINASTERIDE 5 MG/1
5 TABLET, FILM COATED ORAL DAILY
COMMUNITY
Start: 2024-03-12 | End: 2024-04-26

## 2024-04-16 RX ORDER — TAMSULOSIN HYDROCHLORIDE 0.4 MG/1
0.4 CAPSULE ORAL DAILY
COMMUNITY
Start: 2024-03-12 | End: 2024-04-26

## 2024-04-17 ENCOUNTER — TRANSFERRED RECORDS (OUTPATIENT)
Dept: HEALTH INFORMATION MANAGEMENT | Facility: CLINIC | Age: 79
End: 2024-04-17

## 2024-04-17 ENCOUNTER — OFFICE VISIT (OUTPATIENT)
Dept: FAMILY MEDICINE | Facility: CLINIC | Age: 79
End: 2024-04-17
Payer: COMMERCIAL

## 2024-04-17 VITALS
OXYGEN SATURATION: 98 % | SYSTOLIC BLOOD PRESSURE: 138 MMHG | WEIGHT: 222 LBS | DIASTOLIC BLOOD PRESSURE: 74 MMHG | HEIGHT: 72 IN | HEART RATE: 82 BPM | RESPIRATION RATE: 18 BRPM | BODY MASS INDEX: 30.07 KG/M2

## 2024-04-17 DIAGNOSIS — T50.905A ADVERSE EFFECT OF DRUG, INITIAL ENCOUNTER: Primary | ICD-10-CM

## 2024-04-17 DIAGNOSIS — E11.628 TYPE 2 DIABETES MELLITUS WITH OTHER SKIN COMPLICATION, WITHOUT LONG-TERM CURRENT USE OF INSULIN (H): ICD-10-CM

## 2024-04-17 DIAGNOSIS — L50.9 URTICARIA: ICD-10-CM

## 2024-04-17 PROCEDURE — 99214 OFFICE O/P EST MOD 30 MIN: CPT | Performed by: FAMILY MEDICINE

## 2024-04-17 RX ORDER — ACETAMINOPHEN 325 MG/1
975 TABLET ORAL ONCE
Status: CANCELLED | OUTPATIENT
Start: 2024-04-17 | End: 2024-04-17

## 2024-04-17 RX ORDER — CLINDAMYCIN PHOSPHATE 900 MG/50ML
900 INJECTION, SOLUTION INTRAVENOUS SEE ADMIN INSTRUCTIONS
Status: CANCELLED | OUTPATIENT
Start: 2024-04-17

## 2024-04-17 RX ORDER — TRANEXAMIC ACID 650 MG/1
1950 TABLET ORAL ONCE
Status: CANCELLED | OUTPATIENT
Start: 2024-04-17 | End: 2024-04-17

## 2024-04-17 RX ORDER — CLINDAMYCIN PHOSPHATE 900 MG/50ML
900 INJECTION, SOLUTION INTRAVENOUS
Status: CANCELLED | OUTPATIENT
Start: 2024-04-17

## 2024-04-17 RX ORDER — METHYLPREDNISOLONE 4 MG
TABLET, DOSE PACK ORAL
Qty: 21 TABLET | Refills: 0 | Status: SHIPPED | OUTPATIENT
Start: 2024-04-17 | End: 2024-04-26

## 2024-04-17 RX ORDER — HYDROXYZINE HYDROCHLORIDE 10 MG/1
10 TABLET, FILM COATED ORAL 3 TIMES DAILY PRN
Qty: 20 TABLET | Refills: 0 | Status: SHIPPED | OUTPATIENT
Start: 2024-04-17 | End: 2024-04-26

## 2024-04-17 NOTE — PROGRESS NOTES
Assessment & Plan     Adverse effect of drug, initial encounter  Treatment as follows  - methylPREDNISolone (MEDROL DOSEPAK) 4 MG tablet therapy pack; Follow Package Directions  - hydrOXYzine HCl (ATARAX) 10 MG tablet; Take 1 tablet (10 mg) by mouth 3 times daily as needed for itching    Urticaria  Wait at least 2 weeks before scheduling another preop exam I suggest he call his orthopedic surgeon and cancel his surgery            BMI  Estimated body mass index is 30.11 kg/m  as calculated from the following:    Height as of this encounter: 1.829 m (6').    Weight as of this encounter: 100.7 kg (222 lb).             Subjective   Lance is a 79 year old, presenting for the following health issues:  Follow Up (Allergic reaction)      4/17/2024    11:37 AM   Additional Questions   Roomed by SANDRA Diaz Lance was scheduled for a preoperative exam this morning for a left total knee replacement however he was placed on Keflex following a procedure 2 weeks ago for a pain device implant for his spinal disease and he developed acute urticaria which is keeping him awake as severe itching.  His diabetes is out of control he quit taking his metformin blood sugars have been over 300.  We need to treat this I do not think they will be able to give him general anesthesia under these conditions he will call his orthopedic surgeon to reschedule surgery.  I will prescribe a Medrol Dosepak and some Atarax for itching.  He is unable to take cornstarch baths because of his debilitating situation.  Will perform preop down the road              Review of Systems  CONSTITUTIONAL: NEGATIVE for fever, chills, change in weight  INTEGUMENTARY/SKIN: NEGATIVE for worrisome rashes, moles or lesions  EYES: NEGATIVE for vision changes or irritation  ENT/MOUTH: NEGATIVE for ear, mouth and throat problems  RESP: NEGATIVE for significant cough or SOB  BREAST: NEGATIVE for masses, tenderness or discharge  CV: NEGATIVE for chest pain, palpitations  or peripheral edema  GI: NEGATIVE for nausea, abdominal pain, heartburn, or change in bowel habits  : NEGATIVE for frequency, dysuria, or hematuria  MUSCULOSKELETAL: NEGATIVE for significant arthralgias or myalgia  NEURO: NEGATIVE for weakness, dizziness or paresthesias  ENDOCRINE: NEGATIVE for temperature intolerance, skin/hair changes  HEME: NEGATIVE for bleeding problems  PSYCHIATRIC: NEGATIVE for changes in mood or affect      Objective    /74   Pulse 82   Resp 18   Ht 1.829 m (6')   Wt 100.7 kg (222 lb)   SpO2 98%   BMI 30.11 kg/m    Body mass index is 30.11 kg/m .  Physical Exam   Patient has acute urticarial hives all over his body some of which have been secondarily excoriated.  Cardiovascular exam is normal chest was clear we will prescribe corticosteroids and antihistaminic drugs and cancel his surgery            Signed Electronically by: Rickey Adamson MD

## 2024-04-17 NOTE — PROGRESS NOTES
"Preoperative Evaluation  [facility]  [address]  [facility] [address]  Phone: [phone]  Fax: [phone]  Primary Provider: Yemi Adamson  Pre-op Performing Provider: YEMI ADAMSON  Apr 17, 2024     Lance is a 79 year old, presenting for the following:  Pre-Op Exam (4/24/2024 // Juan Manuel // Michele Velasquez MD // LEFT TOTAL KNEE ARTHROPLASTY)        4/17/2024    11:37 AM   Additional Questions   Roomed by SANDRA Diaz     Surgical Information  Surgery/Procedure: LEFT TOTAL KNEE ARTHROPLASTY  Surgery Location: [facility]  Surgeon: Mcihele Velasquez MD  Surgery Date: 4/24/2024  Where patient plans to recover: At home with family  Fax number for surgical facility: Note does not need to be faxed, will be available electronically in Epic.    Assessment & Plan     The proposed surgical procedure is considered {HIGH=major cardiovascular or procedures requiring prolonged anesthesia >4 hours or large fluid shifts;    INTERMEDIATE=abdominal, most orthopedic and intrathoracic surgery; LOW= endoscopy, cataract and breast surgery:539564} risk.    {Diag Picklist:769736}       Implanted Device  {PREOP IMPLANTED DEVICE:577912::\" - Type of device: *** Patient advised to bring device information on day of surgery.\"}      {Risks and Recommendations :412362}    {REQUIRED - Medication Management :375847}    Recommendation  {IMPORTANT - Approval:255138:}    {[facility] 2021 Documentation (Optional):890941}  {2021 E&M time (Optional):939478}    Subjective       HPI related to upcoming procedure: ***        4/10/2024     5:22 PM   Preop Questions   1. Have you ever had a heart attack or stroke? YES - ***   2. Have you ever had surgery on your heart or blood vessels, such as a stent placement, a coronary artery bypass, or surgery on an artery in your head, neck, heart, or legs? No   3. Do you have chest pain with activity? No   4. Do you have a history of  heart failure? No   5. Do you " currently have a cold, bronchitis or symptoms of other infection? No   6. Do you have a cough, shortness of breath, or wheezing? No   7. Do you or anyone in your family have previous history of blood clots? No   8. Do you or does anyone in your family have a serious bleeding problem such as prolonged bleeding following surgeries or cuts? No   9. Have you ever had problems with anemia or been told to take iron pills? YES - ***   10. Have you had any abnormal blood loss such as black, tarry or bloody stools? No   11. Have you ever had a blood transfusion? No   12. Are you willing to have a blood transfusion if it is medically needed before, during, or after your surgery? Yes   13. Have you or any of your relatives ever had problems with anesthesia? No   14. Do you have sleep apnea, excessive snoring or daytime drowsiness? No   15. Do you have any artifical heart valves or other implanted medical devices like a pacemaker, defibrillator, or continuous glucose monitor? YES - ***   15a. What type of device do you have? Spinal Cord Stimulator, by [employer]   15b. Name of the clinic that manages your device:  [facility], [place], [place]   16. Do you have artificial joints? YES - ***   17. Are you allergic to latex? No       Health Care Directive  Patient has a Health Care Directive on file      Preoperative Review of   {Mnpmpreport:721903}  {Review MNPMP for all patients per [facility] MNPMP Profile:191216}    {Chronic problem details (Optional) :801373}    Patient Active Problem List    Diagnosis Date Noted    Type 2 diabetes, HbA1c goal < 7% (H) 02/22/2013     Priority: High    Hypertension 07/06/2008     Priority: High    Transient cerebral ischemia 07/09/2004     Priority: High     Problem list name updated by automated process. Provider to review      Enlarged prostate 04/16/2024     Priority: Medium    Coronary artery disease involving native coronary artery of native heart without angina pectoris 08/10/2023      Priority: Medium    Chronic systolic congestive heart failure (H) 08/08/2023     Priority: Medium    Osteomyelitis of ankle and foot (H) 08/07/2023     Priority: Medium    Cellulitis and abscess of foot excluding toe 07/25/2023     Priority: Medium    Diabetic ulcer of toe of right foot associated with diabetes mellitus due to underlying condition, with necrosis of bone (H) 07/25/2023     Priority: Medium    Slowing of urinary stream 02/01/2023     Priority: Medium    Nocturia 02/01/2023     Priority: Medium    Diabetic ulcer of toe of right foot associated with diabetes mellitus due to underlying condition, limited to breakdown of skin (H) 01/26/2023     Priority: Medium    Muscle pain 12/19/2022     Priority: Medium    Diabetic neuropathy associated with type 2 diabetes mellitus (H) 10/17/2022     Priority: Medium    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 10/14/2022     Priority: Medium    Cellulitis of right lower extremity 08/25/2022     Priority: Medium    H/O amputation of lesser toe, left (H24) 01/26/2022     Priority: Medium    Acute idiopathic gout of right hand 12/27/2021     Priority: Medium    Other constipation 12/09/2021     Priority: Medium    Acute renal insufficiency 12/03/2021     Priority: Medium    Acute right-sided thoracic back pain 12/03/2021     Priority: Medium    Ulcerated, foot, unspecified laterality, limited to breakdown of skin (H) 07/28/2021     Priority: Medium    Ulcerated, foot (H) 07/27/2020     Priority: Medium    Obesity (BMI 35.0-39.9) with comorbidity (H) 02/05/2020     Priority: Medium    Stage 3b chronic kidney disease (H) 02/05/2020     Priority: Medium    Shortness of breath 03/04/2019     Priority: Medium     Added automatically from request for surgery [id number]      Renal colic 03/04/2019     Priority: Medium    Dyspnea on exertion 02/22/2019     Priority: Medium     Added automatically from request for surgery [id number]      Chest pain, unspecified type 02/22/2019     Priority: Medium      Added automatically from request for surgery [id number]      Cardiomyopathy, unspecified type (H) 02/22/2019     Priority: Medium     Added automatically from request for surgery [id number]      Skin rash 12/08/2018     Priority: Medium    Total knee replacement status 12/07/2018     Priority: Medium    Amputated toe, left (H24) 11/08/2018     Priority: Medium    Cervicalgia 02/24/2017     Priority: Medium    Lower urinary tract symptoms 04/07/2016     Priority: Medium    Type II diabetes mellitus (H) 07/09/2013     Priority: Medium    Gouty arthropathy 07/09/2013     Priority: Medium    CARDIOVASCULAR SCREENING; LDL GOAL LESS THAN 100 10/31/2010     Priority: Medium    Hypertrophy of prostate with urinary obstruction 07/09/2004     Priority: Medium     Problem list name updated by automated process. Provider to review      Ventral hernia 06/28/2004     Priority: Medium     Problem list name updated by automated process. Provider to review      Chronic low back pain 01/01/2000     Priority: Medium    Gout 07/20/2012     Priority: Low    Chronic rhinitis 07/09/2004     Priority: Low      Past Medical History:   Diagnosis Date    Anemia     Arthritis     osteoarthritis knees    BPH     CAD (coronary artery disease)     subtotal occlusion in the small distal LAD     Cardiomyopathy     Cardiomyopathy (H)     Cerebral artery occlusion with cerebral infarction     TIA 1993, no residual    Cervicalgia     Chronic kidney disease     Chronic low back pain     Chronic rhinitis     Chronic rhinitis     Gouty arthropathy     HTN (hypertension)     Hyperlipidemia     Hypertension     Kidney stone     Nocturia     Obesity     PVD (peripheral vascular disease)     Sleep apnea     doesn't use cpap    TIA (transient ischaemic attack) 1993    Type 2 diabetes mellitus - 11/08/2018    Ureteral stone      Past Surgical History:   Procedure Laterality Date    AMPUTATE FOOT Right 08/07/2023    Procedure: AMPUTATION, right hallux;  Surgeon:  Nakul Salazar DPM;  Location: [facility] Main OR    AMPUTATE TOE(S) Left 10/15/2018    Procedure: AMPUTATE TOE(S);  Left third toe amputation;  Surgeon: Ayaka Azevedo DPM, Podiatry/Foot and Ankle Surgery;  Location: [facility] OR    ARTHROPLASTY KNEE Right 12/07/2018    Procedure: Right total knee arthroplasty;  Surgeon: Issa Cunha MD;  Location: [facility] OR    COLONOSCOPY  03/09/2013    Procedure: COLONOSCOPY;  COLONOSCOPY;  Surgeon: Chau Hogan MD;  Location:  GI    CV HEART CATHETERIZATION WITH POSSIBLE INTERVENTION Left 03/05/2019    Procedure: Coronary Angiogram;  Surgeon: Nas Linda MD;  Location:  HEART CARDIAC CATH LAB    Diastasis recti repair  1985    EXTRACAPSULAR CATARACT EXTRATION WITH INTRAOCULAR LENS IMPLANT Left 03/13/2017    EXTRACAPSULAR CATARACT EXTRATION WITH INTRAOCULAR LENS IMPLANT Right     FOOT SURGERY  04/2013    cyst removal     HERNIA REPAIR  07/13/2004    ventral     ORIF Shoulder Left     urolift  01/15/2024    Ventral hernia repair NOS  1987     Current Outpatient Medications   Medication Sig Dispense Refill    amLODIPine (NORVASC) 5 MG tablet TAKE 1 TABLET DAILY 90 tablet 3    aspirin (ASA) 81 MG EC tablet Take by mouth every 24 hours      atorvastatin (LIPITOR) 40 MG tablet TAKE 1 TABLET EVERY 24 HOURS 90 tablet 0    blood glucose monitoring (NO BRAND SPECIFIED) meter device kit Use to test blood sugar 2 times daily or as directed. 1 kit 0    calcium carbonate (TUMS) 500 MG chewable tablet Take 1,000 mg by mouth      colchicine (COLCYRS) 0.6 MG tablet TAKE 1 TABLET DAILY 90 tablet 3    CONTOUR NEXT TEST test strip USE TO TEST BLOOD SUGAR 2 TIMES DAILY OR AS DIRECTED. 100 strip 0    diclofenac (VOLTAREN) 1 % topical gel Apply 4 g topically 4 times daily      Ferrous Gluconate 240 (27 Fe) MG TABS Take 1 tablet by mouth daily       finasteride (PROSCAR) 5 MG tablet Take 5 mg by mouth daily      fluticasone (FLONASE) 50 MCG/ACT nasal spray Spray 1-2 sprays in nostril  "every 24 hours      gabapentin (NEURONTIN) 300 MG capsule Take 1 capsule (300 mg) by mouth 3 times daily 270 capsule 0    isosorbide mononitrate (IMDUR) 30 MG 24 hr tablet TAKE 1 TABLET DAILY 90 tablet 0    Lidocaine (LIDOCARE) 4 % Patch Place 1 patch onto the skin every 24 hours To prevent lidocaine toxicity, patient should be patch free for 12 hrs daily.      lisinopril (ZESTRIL) 10 MG tablet Take 1 tablet (10 mg) by mouth every 24 hours 90 tablet 3    loratadine (CLARITIN) 10 MG tablet Take 10 mg by mouth daily as needed for allergies      medical cannabis (Patient's own supply) 5mg at night      melatonin 5 MG CAPS Take 5 mg by mouth at bedtime      metFORMIN (GLUCOPHAGE XR) 500 MG 24 hr tablet Take 1 tablet (500 mg) by mouth 2 times daily (with meals) 180 tablet 2    Microlet Lancets MISC USE TO TEST BLOOD SUGAR TWICE A DAY OR AS DIRECTED 100 each 1    multivitamin w/minerals (THERA-VIT-M) tablet Take 1 tablet by mouth daily      nortriptyline (PAMELOR) 10 MG capsule Take 20 mg by mouth at bedtime      Probiotic Product (FORTIFY 30 BILLION PROBIOT 50+) CPDR Take 1 5 Pack by mouth daily 90 capsule 1    Semaglutide (RYBELSUS) 14 MG tablet Take 14 mg by mouth daily 30 tablet 3    tamsulosin (FLOMAX) 0.4 MG capsule Take 0.4 mg by mouth daily      torsemide (DEMADEX) 20 MG tablet TAKE 1 TABLET DAILY 90 tablet 3       Allergies   Allergen Reactions    Penicillins Anaphylaxis     22 - tolerated cefepime     Sulfa Antibiotics      \"itchy rash, swelling of face and hands\"    Ancef [Cefazolin] Rash    Cephalexin Itching and Rash        Social History     Tobacco Use    Smoking status: Former     Current packs/day: 0.00     Types: Cigarettes     Quit date: 3/17/1993     Years since quittin.1    Smokeless tobacco: Never   Substance Use Topics    Alcohol use: Not Currently     {FAMILY HISTORY (Optional):441762570}  History   Drug Use No         Review of Systems  {ROS Picklists (Optional):632263}    Objective  "   /74   Pulse 82   Resp 18   Ht 1.829 m (6')   Wt 100.7 kg (222 lb)   SpO2 98%   BMI 30.11 kg/m     Estimated body mass index is 30.11 kg/m  as calculated from the following:    Height as of this encounter: 1.829 m (6').    Weight as of this encounter: 100.7 kg (222 lb).  Physical Exam  {Exam List (Optional):865688}    Recent Labs   Lab Test 24  1338 24  1113 24  1552 23  0852 08/15/23  0820 23  0554 23  0904   HGB 12.9* 12.3*   < >  --  10.5*   < > 10.2*    234   < >  --  335   < >  --    NA  --   --   --  141 137   < >  --    POTASSIUM 4.9 4.7  --  4.5 4.3   < >  --    CR  --   --   --  1.65* 2.06*   < >  --    A1C  --  8.0*  --   --   --   --  7.3*    < > = values in this interval not displayed.        Diagnostics  {LABS:310632}   {EK}    Revised Cardiac Risk Index (RCRI)  The patient has the following serious cardiovascular risks for perioperative complications:  {PREOP REVISED CARDIAC RISK INDEX (RCRI) :419465}     RCRI Interpretation: {REVISED CARDIAC RISK INTERPRETATION :823861}         Signed Electronically by: Rickey Adamson MD  Copy of this evaluation report is provided to requesting physician.    {Provider Resources  Preop [facility] [place] Preop Guidelines  Revised Cardiac Risk Index :444790}   {Email feedback regarding this note to [email or web address]   :458409}

## 2024-04-20 ENCOUNTER — HOSPITAL ENCOUNTER (EMERGENCY)
Facility: CLINIC | Age: 79
Discharge: HOME | End: 2024-04-20
Payer: COMMERCIAL

## 2024-04-20 VITALS
RESPIRATION RATE: 18 BRPM | TEMPERATURE: 98 F | OXYGEN SATURATION: 96 % | DIASTOLIC BLOOD PRESSURE: 71 MMHG | SYSTOLIC BLOOD PRESSURE: 163 MMHG | HEART RATE: 93 BPM

## 2024-04-20 DIAGNOSIS — L28.2 PRURITIC RASH: ICD-10-CM

## 2024-04-20 PROCEDURE — 99283 EMERGENCY DEPT VISIT LOW MDM: CPT

## 2024-04-20 PROCEDURE — 250N000013 HC RX MED GY IP 250 OP 250 PS 637

## 2024-04-20 RX ORDER — FAMOTIDINE 20 MG/1
20 TABLET, FILM COATED ORAL ONCE
Status: COMPLETED | OUTPATIENT
Start: 2024-04-20 | End: 2024-04-20

## 2024-04-20 RX ORDER — HYDROXYZINE HYDROCHLORIDE 25 MG/1
25 TABLET, FILM COATED ORAL 3 TIMES DAILY PRN
Qty: 20 TABLET | Refills: 0 | Status: ON HOLD | OUTPATIENT
Start: 2024-04-20 | End: 2024-06-19

## 2024-04-20 RX ORDER — FAMOTIDINE 20 MG/1
20 TABLET, FILM COATED ORAL 2 TIMES DAILY PRN
Qty: 30 TABLET | Refills: 0 | Status: SHIPPED | OUTPATIENT
Start: 2024-04-20 | End: 2024-08-05

## 2024-04-20 RX ORDER — HYDROXYZINE HYDROCHLORIDE 25 MG/1
50 TABLET, FILM COATED ORAL ONCE
Status: COMPLETED | OUTPATIENT
Start: 2024-04-20 | End: 2024-04-20

## 2024-04-20 RX ADMIN — FAMOTIDINE 20 MG: 20 TABLET, FILM COATED ORAL at 16:48

## 2024-04-20 RX ADMIN — HYDROXYZINE HYDROCHLORIDE 50 MG: 25 TABLET ORAL at 16:48

## 2024-04-20 ASSESSMENT — ACTIVITIES OF DAILY LIVING (ADL)
ADLS_ACUITY_SCORE: 39
ADLS_ACUITY_SCORE: 37

## 2024-04-20 ASSESSMENT — COLUMBIA-SUICIDE SEVERITY RATING SCALE - C-SSRS
2. HAVE YOU ACTUALLY HAD ANY THOUGHTS OF KILLING YOURSELF IN THE PAST MONTH?: NO
1. IN THE PAST MONTH, HAVE YOU WISHED YOU WERE DEAD OR WISHED YOU COULD GO TO SLEEP AND NOT WAKE UP?: NO
6. HAVE YOU EVER DONE ANYTHING, STARTED TO DO ANYTHING, OR PREPARED TO DO ANYTHING TO END YOUR LIFE?: NO

## 2024-04-20 NOTE — ED PROVIDER NOTES
EMERGENCY DEPARTMENT ENCOUNTER      NAME: Lance Ibrahim  AGE: 79 year old male  YOB: 1945  MRN: [id number]  EVALUATION DATE & TIME: 2024  3:34 PM    PCP: Rickey Adamson    ED PROVIDER: Dali Rutherford PA-C      Chief Complaint   Patient presents with    Rash         FINAL IMPRESSION:  1. Pruritic rash          ED COURSE & MEDICAL DECISION MAKIN:20 PM Met with patient for initial interview. Plan for care discussed.  5:28 PM Reevaluated and updated patient. I discussed the plan for discharge with the patient, and patient is agreeable. We discussed supportive cares at home and reasons for return to the ER including new or worsening symptoms. All questions and concerns addressed. Patient to be discharged by RN.    79 year old male with a history of DM II, CHF, CAD, CKD, TIA, gout, presents to the Emergency Department for evaluation of pruritic rash. Patient developed full body hives 4 days into starting a Keflex that was prescribed on 24. Per chart review, he was evaluated by PCP on 24 with ongoing hives and severe itching. He was discharged on Medrol Dosepak and Atarax for itching. Unfortunately, patient's pharmacy had not filled his Atarax prescription and patient presents today with complaints of persistent itching as he was unaware of prescription for Atarax was ordered. He reports rash is gradually improving with Medrol Dosepak, which he is still on. Patient also notes allergy to Ancef, Sulfa, and anaphylaxis to penicillins. He denies any oral or airway involvement at time of initial hives reaction.     Upon exam, patient is afebrile, hemodynamically stable, appears itchy, but in no acute distress. Scattered erythematous macular rash, some with abrasions likely secondary from scratching without evidence of cellulitis or abscess. No vesicles, bullae, desquamation. No palm, sole, interdigital web involvement. No oral or airway involvement. Tolerates secretions. No associated  fevers/chills. No nuchal rigidity. Conjunctiva clear. Abdomen soft and non-distended with no tenderness to palpation. No jaundice or scleral icterus. Symptoms most consistent with hives likely secondary to drug-reaction (Keflex allergy). No history of liver disease or alcohol use to suggest hyperbilirubinemia as cause for itching.     Patient was treated with Atarax and Pepcid upon arrival with moderate improvement in symptoms. Plan to discharge patient home with prescription Atarax and Pepcid with instructions to continue Medrol Dosepak as previously instructed. Discussed strict return precautions and close follow up with their PCP for reevaluation and ongoing management. The patient was stable and well appearing upon discharge. The patient was advised to return to the ER if any new or worsening symptoms develop. The patient verbalizes understanding and agrees with the plan.       Medical Decision Making  Obtained supplemental history:Supplemental history obtained?: No  Reviewed external records: External records reviewed?: Documented in chart  Care impacted by chronic illness:Diabetes  Care significantly affected by social determinants of health:N/A  Did you consider but not order tests?: Work up considered but not performed and documented in chart, if applicable  Did you interpret images independently?: Independent interpretation of ECG and images noted in documentation, when applicable.  Consultation discussion with other provider:Did you involve another provider (consultant, MH, pharmacy, etc.)?: No  Discharge. I prescribed additional prescription strength medication(s) as charted. See documentation for any additional details.    MEDICATIONS GIVEN IN THE EMERGENCY:  Medications   hydrOXYzine HCl (ATARAX) tablet 50 mg (50 mg Oral $Given 4/20/24 1648)   famotidine (PEPCID) tablet 20 mg (20 mg Oral $Given 4/20/24 1648)       NEW PRESCRIPTIONS STARTED AT TODAY'S ER VISIT  Discharge Medication List as of 4/20/2024   5:34 PM        START taking these medications    Details   famotidine (PEPCID) 20 MG tablet Take 1 tablet (20 mg) by mouth 2 times daily as needed (itching), Disp-30 tablet, R-0, Local Print      !! hydrOXYzine HCl (ATARAX) 25 MG tablet Take 1 tablet (25 mg) by mouth 3 times daily as needed for itching, Disp-20 tablet, R-0, Local Print       !! - Potential duplicate medications found. Please discuss with provider.             =================================================================    HPI    Patient information was obtained from: patient    Use of : N/A       Lance Ibrahim is a 79 year old male with a pertinent history of DM II, CHF, CAD, CKD, TIA, gout, presents to the Emergency Department for evaluation of pruritic rash. Patient developed full body hives 4 days into starting Keflex that was prescribed on 4/03/24. Per chart review, he was evaluated by PCP on 4/17/24 with ongoing hives and severe itching. He was discharged on Medrol Dosepak and Atarax for itching. Unfortunately, patient's pharmacy had not filled his Atarax prescription and patient presents today with complaints of persistent itching as he was unaware of prescription for Atarax was ordered. He reports rash is gradually improving with Medrol Dosepak, which he is still on. Patient also notes allergy to Ancef, Sulfa, and anaphylaxis to penicillins. He denies any oral or airway involvement at time of initial hives reaction. He denies any current oral swelling, neck pain/stiffness, chest pain, shortness of breath, fever/chills, pus-like drainage, involvement of soles/palms, or similarly ill sick contacts. He otherwise denies any nausea, vomiting, abdominal pain, yellowing of skin or eyes, history of liver disease or alcohol use, or any other complaints.     REVIEW OF SYSTEMS   Review of Systems see HPI    PAST MEDICAL HISTORY:  Past Medical History:   Diagnosis Date    Anemia     Arthritis     osteoarthritis knees    BPH     CAD  (coronary artery disease)     subtotal occlusion in the small distal LAD     Cardiomyopathy     Cardiomyopathy (H)     Cerebral artery occlusion with cerebral infarction     TIA 1993, no residual    Cervicalgia     Chronic kidney disease     Chronic low back pain     Chronic rhinitis     Chronic rhinitis     Gouty arthropathy     HTN (hypertension)     Hyperlipidemia     Hypertension     Kidney stone     Nocturia     Obesity     PVD (peripheral vascular disease)     Sleep apnea     doesn't use cpap    TIA (transient ischaemic attack) 1993    Type 2 diabetes mellitus - 11/08/2018    Ureteral stone        PAST SURGICAL HISTORY:  Past Surgical History:   Procedure Laterality Date    AMPUTATE FOOT Right 08/07/2023    Procedure: AMPUTATION, right hallux;  Surgeon: Nakul Salazar DPM;  Location: [facility]    AMPUTATE TOE(S) Left 10/15/2018    Procedure: AMPUTATE TOE(S);  Left third toe amputation;  Surgeon: Ayaka Azevedo DPM, Podiatry/Foot and Ankle Surgery;  Location:  OR    ARTHROPLASTY KNEE Right 12/07/2018    Procedure: Right total knee arthroplasty;  Surgeon: Issa Cunha MD;  Location:  OR    COLONOSCOPY  03/09/2013    Procedure: COLONOSCOPY;  COLONOSCOPY;  Surgeon: Chau Hogan MD;  Location:  GI    CV HEART CATHETERIZATION WITH POSSIBLE INTERVENTION Left 03/05/2019    Procedure: Coronary Angiogram;  Surgeon: Nas Linda MD;  Location:  HEART CARDIAC CATH LAB    Diastasis recti repair  1985    EXTRACAPSULAR CATARACT EXTRATION WITH INTRAOCULAR LENS IMPLANT Left 03/13/2017    EXTRACAPSULAR CATARACT EXTRATION WITH INTRAOCULAR LENS IMPLANT Right     FOOT SURGERY  04/2013    cyst removal     HERNIA REPAIR  07/13/2004    ventral     ORIF Shoulder Left     urolift  01/15/2024    Ventral hernia repair NOS  1987           CURRENT MEDICATIONS:    famotidine (PEPCID) 20 MG tablet  hydrOXYzine HCl (ATARAX) 25 MG tablet  amLODIPine (NORVASC) 5 MG tablet  aspirin (ASA) 81 MG EC  "tablet  atorvastatin (LIPITOR) 40 MG tablet  blood glucose monitoring (NO BRAND SPECIFIED) meter device kit  calcium carbonate (TUMS) 500 MG chewable tablet  colchicine (COLCYRS) 0.6 MG tablet  CONTOUR NEXT TEST test strip  diclofenac (VOLTAREN) 1 % topical gel  Ferrous Gluconate 240 (27 Fe) MG TABS  finasteride (PROSCAR) 5 MG tablet  fluticasone (FLONASE) 50 MCG/ACT nasal spray  gabapentin (NEURONTIN) 300 MG capsule  hydrOXYzine HCl (ATARAX) 10 MG tablet  isosorbide mononitrate (IMDUR) 30 MG 24 hr tablet  Lidocaine (LIDOCARE) 4 % Patch  lisinopril (ZESTRIL) 10 MG tablet  loratadine (CLARITIN) 10 MG tablet  medical cannabis (Patient's own supply)  melatonin 5 MG CAPS  metFORMIN (GLUCOPHAGE XR) 500 MG 24 hr tablet  methylPREDNISolone (MEDROL DOSEPAK) 4 MG tablet therapy pack  Microlet Lancets MISC  multivitamin w/minerals (THERA-VIT-M) tablet  nortriptyline (PAMELOR) 10 MG capsule  Probiotic Product (FORTIFY 30 BILLION PROBIOT 50+) CPDR  Semaglutide (RYBELSUS) 14 MG tablet  tamsulosin (FLOMAX) 0.4 MG capsule  torsemide (DEMADEX) 20 MG tablet        ALLERGIES:  Allergies   Allergen Reactions    Penicillins Anaphylaxis     22 - tolerated cefepime     Sulfa Antibiotics      \"itchy rash, swelling of face and hands\"    Ancef [Cefazolin] Rash    Cephalexin Itching and Rash       FAMILY HISTORY:  Family History   Problem Relation Age of Onset    Family History Negative Mother          86 yo    Cancer Father          74 yo brain    Diabetes Maternal Grandfather          89 yo    Alcohol/Drug Paternal Grandfather             Colon Cancer No family hx of        SOCIAL HISTORY:   Social History     Socioeconomic History    Marital status:    Occupational History     Employer: SELF   Tobacco Use    Smoking status: Former     Current packs/day: 0.00     Types: Cigarettes     Quit date: 3/17/1993     Years since quittin.1    Smokeless tobacco: Never   Vaping Use    Vaping status: " Never Used   Substance and Sexual Activity    Alcohol use: Not Currently    Drug use: No    Sexual activity: Never     Social Determinants of Health     Interpersonal Safety: Low Risk  (3/27/2024)    Interpersonal Safety     Do you feel physically and emotionally safe where you currently live?: Yes     Within the past 12 months, have you been hit, slapped, kicked or otherwise physically hurt by someone?: No     Within the past 12 months, have you been humiliated or emotionally abused in other ways by your partner or ex-partner?: No       VITALS:  BP (!) 163/71   Pulse 93   Temp 98  F (36.7  C) (Temporal)   Resp 18   SpO2 96%     PHYSICAL EXAM    Constitutional:  Alert, appears itchy. Cooperative.  EYES: Conjunctivae clear.   HENT:  Atraumatic, normocephalic. Oropharynx clear without edema. Tolerates secretions. No nuchal rigidity.   Respiratory:  Respirations even, unlabored, in no acute respiratory distress. Lungs clear to auscultation bilaterally without wheeze, rhonchi, or rales. No cough. Speaks in full sentences easily.  Cardiovascular:  Regular rate and rhythm, good peripheral perfusion. No peripheral edema. No chest wall tenderness.  GI: Soft, flat, non-distended. Bowel sounds normal. No tenderness to palpation. No guarding, rebound, or other peritoneal signs. Negative Garcia's sign.  Musculoskeletal:  No edema. No cyanosis. Range of motion major extremities intact.    Integument: Warm, Dry. Scattered erythematous macular rash, some with abrasions likely secondary from scratching without evidence of cellulitis or abscess. No vesicles, bullae, desquamation. No palm, sole, interdigital web involvement. No jaundice.   Neurologic:  Alert & oriented. No focal deficits noted.   Psych: Normal mood and affect.     LAB:  All pertinent labs reviewed and interpreted.       RADIOLOGY:  Reviewed all pertinent imaging. Please see official radiology report.  No orders to display     SVETLANA Lange  [facility] EMERGENCY ROOM  [address] [address]  [phone]      Dali Rutherford PA-C  04/20/24 1770

## 2024-04-20 NOTE — ED TRIAGE NOTES
Pt c/o severe rash and itching since starting cephalexin after a surgery on April 3. Took the abx from April 3-7. States the itching has not improved since he stopped the abx. PCP prescribed steroids but he states that has not helped. States he has not been able to sleep d/t discomfort.      Triage Assessment (Adult)       Row Name 04/20/24 1516          Triage Assessment    Airway WDL WDL        Respiratory WDL    Respiratory WDL WDL        Skin Circulation/Temperature WDL    Skin Circulation/Temperature WDL WDL        Cardiac WDL    Cardiac WDL WDL        Peripheral/Neurovascular WDL    Peripheral Neurovascular WDL WDL        Cognitive/Neuro/Behavioral WDL    Cognitive/Neuro/Behavioral WDL WDL

## 2024-04-20 NOTE — DISCHARGE INSTRUCTIONS
You were seen in the ER for evaluation of itching.    You were prescribed Atarax and Pepcid to take as needed for itching. You may continue anti-itch lotions as previously instructed. I strongly encourage you refrain from itching as much as possible to prevent infection.    Please follow up with your primary care provider for reevaluation next week, especially if symptoms persist.    Return to the ER if any new or worsening symptoms develop including redness/warmth/swelling/drainage, fevers/chills, mouth or tongue swelling, chest pain, shortness of breath, or any other concerning symptoms.    Take Care!  -Dali Rutherford PA-C

## 2024-04-25 ENCOUNTER — HOSPITAL ENCOUNTER (INPATIENT)
Facility: CLINIC | Age: 79
LOS: 4 days | Discharge: ANOTHER HOSPITAL | DRG: 445 | End: 2024-04-30
Attending: EMERGENCY MEDICINE | Admitting: HOSPITALIST
Payer: COMMERCIAL

## 2024-04-25 DIAGNOSIS — R74.8 ELEVATED LIPASE: ICD-10-CM

## 2024-04-25 DIAGNOSIS — R21 DIFFUSE PAPULAR RASH: ICD-10-CM

## 2024-04-25 DIAGNOSIS — R21 RASH: Primary | ICD-10-CM

## 2024-04-25 DIAGNOSIS — E87.20 METABOLIC ACIDOSIS: ICD-10-CM

## 2024-04-25 DIAGNOSIS — E87.20 ACIDOSIS: ICD-10-CM

## 2024-04-25 DIAGNOSIS — R79.89 ELEVATED LFTS: ICD-10-CM

## 2024-04-25 DIAGNOSIS — R73.9 HYPERGLYCEMIA: ICD-10-CM

## 2024-04-25 LAB
BASOPHILS # BLD AUTO: 0.1 10E3/UL (ref 0–0.2)
BASOPHILS NFR BLD AUTO: 1 %
EOSINOPHIL # BLD AUTO: 0.2 10E3/UL (ref 0–0.7)
EOSINOPHIL NFR BLD AUTO: 2 %
ERYTHROCYTE [DISTWIDTH] IN BLOOD BY AUTOMATED COUNT: 18.5 % (ref 10–15)
HCT VFR BLD AUTO: 38.2 % (ref 40–53)
HGB BLD-MCNC: 12.7 G/DL (ref 13.3–17.7)
IMM GRANULOCYTES # BLD: 0.3 10E3/UL
IMM GRANULOCYTES NFR BLD: 2 %
LYMPHOCYTES # BLD AUTO: 2.1 10E3/UL (ref 0.8–5.3)
LYMPHOCYTES NFR BLD AUTO: 20 %
MCH RBC QN AUTO: 28.7 PG (ref 26.5–33)
MCHC RBC AUTO-ENTMCNC: 33.2 G/DL (ref 31.5–36.5)
MCV RBC AUTO: 86 FL (ref 78–100)
MONOCYTES # BLD AUTO: 0.8 10E3/UL (ref 0–1.3)
MONOCYTES NFR BLD AUTO: 8 %
NEUTROPHILS # BLD AUTO: 7.1 10E3/UL (ref 1.6–8.3)
NEUTROPHILS NFR BLD AUTO: 67 %
NRBC # BLD AUTO: 0 10E3/UL
NRBC BLD AUTO-RTO: 0 /100
PLATELET # BLD AUTO: 252 10E3/UL (ref 150–450)
RBC # BLD AUTO: 4.42 10E6/UL (ref 4.4–5.9)
WBC # BLD AUTO: 10.6 10E3/UL (ref 4–11)

## 2024-04-25 PROCEDURE — 84450 TRANSFERASE (AST) (SGOT): CPT | Performed by: EMERGENCY MEDICINE

## 2024-04-25 PROCEDURE — 96361 HYDRATE IV INFUSION ADD-ON: CPT

## 2024-04-25 PROCEDURE — 82550 ASSAY OF CK (CPK): CPT | Performed by: HOSPITALIST

## 2024-04-25 PROCEDURE — 83690 ASSAY OF LIPASE: CPT | Performed by: EMERGENCY MEDICINE

## 2024-04-25 PROCEDURE — 82040 ASSAY OF SERUM ALBUMIN: CPT | Performed by: EMERGENCY MEDICINE

## 2024-04-25 PROCEDURE — 86140 C-REACTIVE PROTEIN: CPT | Performed by: HOSPITALIST

## 2024-04-25 PROCEDURE — 250N000013 HC RX MED GY IP 250 OP 250 PS 637: Performed by: EMERGENCY MEDICINE

## 2024-04-25 PROCEDURE — 99285 EMERGENCY DEPT VISIT HI MDM: CPT | Mod: 25

## 2024-04-25 PROCEDURE — 36415 COLL VENOUS BLD VENIPUNCTURE: CPT | Performed by: EMERGENCY MEDICINE

## 2024-04-25 PROCEDURE — 83880 ASSAY OF NATRIURETIC PEPTIDE: CPT | Performed by: HOSPITALIST

## 2024-04-25 PROCEDURE — 85025 COMPLETE CBC W/AUTO DIFF WBC: CPT | Performed by: EMERGENCY MEDICINE

## 2024-04-25 PROCEDURE — 96374 THER/PROPH/DIAG INJ IV PUSH: CPT

## 2024-04-25 RX ORDER — PREGABALIN 25 MG/1
25 CAPSULE ORAL ONCE
Status: COMPLETED | OUTPATIENT
Start: 2024-04-25 | End: 2024-04-25

## 2024-04-25 RX ORDER — GABAPENTIN 100 MG/1
200 CAPSULE ORAL ONCE
Status: DISCONTINUED | OUTPATIENT
Start: 2024-04-25 | End: 2024-04-25

## 2024-04-25 RX ADMIN — PREGABALIN 25 MG: 25 CAPSULE ORAL at 23:54

## 2024-04-25 ASSESSMENT — COLUMBIA-SUICIDE SEVERITY RATING SCALE - C-SSRS
1. IN THE PAST MONTH, HAVE YOU WISHED YOU WERE DEAD OR WISHED YOU COULD GO TO SLEEP AND NOT WAKE UP?: NO
6. HAVE YOU EVER DONE ANYTHING, STARTED TO DO ANYTHING, OR PREPARED TO DO ANYTHING TO END YOUR LIFE?: NO
2. HAVE YOU ACTUALLY HAD ANY THOUGHTS OF KILLING YOURSELF IN THE PAST MONTH?: NO

## 2024-04-25 ASSESSMENT — ACTIVITIES OF DAILY LIVING (ADL): ADLS_ACUITY_SCORE: 39

## 2024-04-26 ENCOUNTER — APPOINTMENT (OUTPATIENT)
Dept: ULTRASOUND IMAGING | Facility: CLINIC | Age: 79
DRG: 445 | End: 2024-04-26
Attending: EMERGENCY MEDICINE
Payer: COMMERCIAL

## 2024-04-26 ENCOUNTER — APPOINTMENT (OUTPATIENT)
Dept: CT IMAGING | Facility: CLINIC | Age: 79
DRG: 445 | End: 2024-04-26
Attending: EMERGENCY MEDICINE
Payer: COMMERCIAL

## 2024-04-26 ENCOUNTER — APPOINTMENT (OUTPATIENT)
Dept: MRI IMAGING | Facility: CLINIC | Age: 79
DRG: 445 | End: 2024-04-26
Attending: PHYSICIAN ASSISTANT
Payer: COMMERCIAL

## 2024-04-26 PROBLEM — R74.8 ELEVATED LIPASE: Status: ACTIVE | Noted: 2024-04-26

## 2024-04-26 PROBLEM — R73.9 HYPERGLYCEMIA: Status: ACTIVE | Noted: 2024-04-26

## 2024-04-26 PROBLEM — R21 DIFFUSE PAPULAR RASH: Status: ACTIVE | Noted: 2024-04-26

## 2024-04-26 PROBLEM — R79.89 ELEVATED LFTS: Status: ACTIVE | Noted: 2024-04-26

## 2024-04-26 LAB
ALBUMIN SERPL BCG-MCNC: 3.5 G/DL (ref 3.5–5.2)
ALBUMIN SERPL BCG-MCNC: 3.7 G/DL (ref 3.5–5.2)
ALP SERPL-CCNC: 589 U/L (ref 40–150)
ALP SERPL-CCNC: 619 U/L (ref 40–150)
ALT SERPL W P-5'-P-CCNC: 320 U/L (ref 0–70)
ALT SERPL W P-5'-P-CCNC: 330 U/L (ref 0–70)
ANION GAP SERPL CALCULATED.3IONS-SCNC: 11 MMOL/L (ref 7–15)
ANION GAP SERPL CALCULATED.3IONS-SCNC: 13 MMOL/L (ref 7–15)
ANION GAP SERPL CALCULATED.3IONS-SCNC: 13 MMOL/L (ref 7–15)
APAP SERPL-MCNC: <5 UG/ML (ref 10–30)
AST SERPL W P-5'-P-CCNC: 250 U/L (ref 0–45)
AST SERPL W P-5'-P-CCNC: 251 U/L (ref 0–45)
B-OH-BUTYR SERPL-SCNC: <0.18 MMOL/L
BASE EXCESS BLDV CALC-SCNC: -11.1 MMOL/L (ref -3–3)
BASOPHILS # BLD AUTO: 0.1 10E3/UL (ref 0–0.2)
BASOPHILS NFR BLD AUTO: 1 %
BILIRUB DIRECT SERPL-MCNC: 4.14 MG/DL (ref 0–0.3)
BILIRUB SERPL-MCNC: 4.9 MG/DL
BILIRUB SERPL-MCNC: 5 MG/DL
BUN SERPL-MCNC: 50.8 MG/DL (ref 8–23)
BUN SERPL-MCNC: 51.7 MG/DL (ref 8–23)
BUN SERPL-MCNC: 56.3 MG/DL (ref 8–23)
CALCIUM SERPL-MCNC: 8.6 MG/DL (ref 8.8–10.2)
CALCIUM SERPL-MCNC: 9 MG/DL (ref 8.8–10.2)
CALCIUM SERPL-MCNC: 9.4 MG/DL (ref 8.8–10.2)
CHLORIDE SERPL-SCNC: 104 MMOL/L (ref 98–107)
CHLORIDE SERPL-SCNC: 109 MMOL/L (ref 98–107)
CHLORIDE SERPL-SCNC: 111 MMOL/L (ref 98–107)
CK SERPL-CCNC: 100 U/L (ref 39–308)
CREAT SERPL-MCNC: 1.93 MG/DL (ref 0.67–1.17)
CREAT SERPL-MCNC: 2.05 MG/DL (ref 0.67–1.17)
CREAT SERPL-MCNC: 2.17 MG/DL (ref 0.67–1.17)
CRP SERPL-MCNC: 6 MG/L
DEPRECATED HCO3 PLAS-SCNC: 14 MMOL/L (ref 22–29)
DEPRECATED HCO3 PLAS-SCNC: 15 MMOL/L (ref 22–29)
DEPRECATED HCO3 PLAS-SCNC: 16 MMOL/L (ref 22–29)
EGFRCR SERPLBLD CKD-EPI 2021: 30 ML/MIN/1.73M2
EGFRCR SERPLBLD CKD-EPI 2021: 32 ML/MIN/1.73M2
EGFRCR SERPLBLD CKD-EPI 2021: 35 ML/MIN/1.73M2
EOSINOPHIL # BLD AUTO: 0.3 10E3/UL (ref 0–0.7)
EOSINOPHIL NFR BLD AUTO: 3 %
ERYTHROCYTE [DISTWIDTH] IN BLOOD BY AUTOMATED COUNT: 18.1 % (ref 10–15)
GLUCOSE BLDC GLUCOMTR-MCNC: 167 MG/DL (ref 70–99)
GLUCOSE BLDC GLUCOMTR-MCNC: 172 MG/DL (ref 70–99)
GLUCOSE BLDC GLUCOMTR-MCNC: 189 MG/DL (ref 70–99)
GLUCOSE BLDC GLUCOMTR-MCNC: 205 MG/DL (ref 70–99)
GLUCOSE BLDC GLUCOMTR-MCNC: 240 MG/DL (ref 70–99)
GLUCOSE SERPL-MCNC: 197 MG/DL (ref 70–99)
GLUCOSE SERPL-MCNC: 317 MG/DL (ref 70–99)
GLUCOSE SERPL-MCNC: 482 MG/DL (ref 70–99)
HAV IGM SERPL QL IA: NONREACTIVE
HBV CORE IGM SERPL QL IA: NONREACTIVE
HBV SURFACE AG SERPL QL IA: NONREACTIVE
HCO3 BLDV-SCNC: 16 MMOL/L (ref 21–28)
HCT VFR BLD AUTO: 35.5 % (ref 40–53)
HCV AB SERPL QL IA: NONREACTIVE
HGB BLD-MCNC: 11.9 G/DL (ref 13.3–17.7)
IMM GRANULOCYTES # BLD: 0.2 10E3/UL
IMM GRANULOCYTES NFR BLD: 2 %
INR PPP: 1.11 (ref 0.85–1.15)
LACTATE SERPL-SCNC: 1.8 MMOL/L (ref 0.7–2)
LACTATE SERPL-SCNC: 3 MMOL/L (ref 0.7–2)
LIPASE SERPL-CCNC: 444 U/L (ref 13–60)
LIPASE SERPL-CCNC: 495 U/L (ref 13–60)
LYMPHOCYTES # BLD AUTO: 2.8 10E3/UL (ref 0.8–5.3)
LYMPHOCYTES NFR BLD AUTO: 25 %
MCH RBC QN AUTO: 29 PG (ref 26.5–33)
MCHC RBC AUTO-ENTMCNC: 33.5 G/DL (ref 31.5–36.5)
MCV RBC AUTO: 87 FL (ref 78–100)
MONOCYTES # BLD AUTO: 0.9 10E3/UL (ref 0–1.3)
MONOCYTES NFR BLD AUTO: 8 %
NEUTROPHILS # BLD AUTO: 7.1 10E3/UL (ref 1.6–8.3)
NEUTROPHILS NFR BLD AUTO: 63 %
NRBC # BLD AUTO: 0 10E3/UL
NRBC BLD AUTO-RTO: 0 /100
NT-PROBNP SERPL-MCNC: 1078 PG/ML (ref 0–1800)
NT-PROBNP SERPL-MCNC: 959 PG/ML (ref 0–1800)
O2/TOTAL GAS SETTING VFR VENT: ABNORMAL %
OXYHGB MFR BLDV: 66 % (ref 70–75)
PCO2 BLDV: 34 MM HG (ref 40–50)
PH BLDV: 7.28 [PH] (ref 7.32–7.43)
PLATELET # BLD AUTO: 253 10E3/UL (ref 150–450)
PO2 BLDV: 37 MM HG (ref 25–47)
POTASSIUM SERPL-SCNC: 3.8 MMOL/L (ref 3.4–5.3)
POTASSIUM SERPL-SCNC: 3.8 MMOL/L (ref 3.4–5.3)
POTASSIUM SERPL-SCNC: 4.4 MMOL/L (ref 3.4–5.3)
PROCALCITONIN SERPL IA-MCNC: 0.65 NG/ML
PROT SERPL-MCNC: 7.3 G/DL (ref 6.4–8.3)
PROT SERPL-MCNC: 7.7 G/DL (ref 6.4–8.3)
RBC # BLD AUTO: 4.1 10E6/UL (ref 4.4–5.9)
SAO2 % BLDV: 67.4 % (ref 70–75)
SODIUM SERPL-SCNC: 132 MMOL/L (ref 135–145)
SODIUM SERPL-SCNC: 136 MMOL/L (ref 135–145)
SODIUM SERPL-SCNC: 138 MMOL/L (ref 135–145)
WBC # BLD AUTO: 11.4 10E3/UL (ref 4–11)

## 2024-04-26 PROCEDURE — 82962 GLUCOSE BLOOD TEST: CPT

## 2024-04-26 PROCEDURE — 255N000002 HC RX 255 OP 636: Performed by: STUDENT IN AN ORGANIZED HEALTH CARE EDUCATION/TRAINING PROGRAM

## 2024-04-26 PROCEDURE — 86334 IMMUNOFIX E-PHORESIS SERUM: CPT | Performed by: PATHOLOGY

## 2024-04-26 PROCEDURE — 82374 ASSAY BLOOD CARBON DIOXIDE: CPT | Performed by: STUDENT IN AN ORGANIZED HEALTH CARE EDUCATION/TRAINING PROGRAM

## 2024-04-26 PROCEDURE — 85025 COMPLETE CBC W/AUTO DIFF WBC: CPT | Performed by: HOSPITALIST

## 2024-04-26 PROCEDURE — 36415 COLL VENOUS BLD VENIPUNCTURE: CPT | Performed by: STUDENT IN AN ORGANIZED HEALTH CARE EDUCATION/TRAINING PROGRAM

## 2024-04-26 PROCEDURE — 76705 ECHO EXAM OF ABDOMEN: CPT

## 2024-04-26 PROCEDURE — 80053 COMPREHEN METABOLIC PANEL: CPT | Performed by: HOSPITALIST

## 2024-04-26 PROCEDURE — 83605 ASSAY OF LACTIC ACID: CPT | Performed by: STUDENT IN AN ORGANIZED HEALTH CARE EDUCATION/TRAINING PROGRAM

## 2024-04-26 PROCEDURE — 82010 KETONE BODYS QUAN: CPT | Performed by: STUDENT IN AN ORGANIZED HEALTH CARE EDUCATION/TRAINING PROGRAM

## 2024-04-26 PROCEDURE — 250N000012 HC RX MED GY IP 250 OP 636 PS 637: Performed by: EMERGENCY MEDICINE

## 2024-04-26 PROCEDURE — 120N000001 HC R&B MED SURG/OB

## 2024-04-26 PROCEDURE — 99223 1ST HOSP IP/OBS HIGH 75: CPT | Performed by: HOSPITALIST

## 2024-04-26 PROCEDURE — 74176 CT ABD & PELVIS W/O CONTRAST: CPT

## 2024-04-26 PROCEDURE — 258N000003 HC RX IP 258 OP 636: Performed by: EMERGENCY MEDICINE

## 2024-04-26 PROCEDURE — 258N000003 HC RX IP 258 OP 636: Performed by: HOSPITALIST

## 2024-04-26 PROCEDURE — 250N000013 HC RX MED GY IP 250 OP 250 PS 637: Performed by: STUDENT IN AN ORGANIZED HEALTH CARE EDUCATION/TRAINING PROGRAM

## 2024-04-26 PROCEDURE — 82248 BILIRUBIN DIRECT: CPT | Performed by: HOSPITALIST

## 2024-04-26 PROCEDURE — 250N000011 HC RX IP 250 OP 636: Performed by: EMERGENCY MEDICINE

## 2024-04-26 PROCEDURE — 36415 COLL VENOUS BLD VENIPUNCTURE: CPT | Performed by: HOSPITALIST

## 2024-04-26 PROCEDURE — 80143 DRUG ASSAY ACETAMINOPHEN: CPT | Performed by: HOSPITALIST

## 2024-04-26 PROCEDURE — 250N000013 HC RX MED GY IP 250 OP 250 PS 637: Performed by: HOSPITALIST

## 2024-04-26 PROCEDURE — 85610 PROTHROMBIN TIME: CPT | Performed by: HOSPITALIST

## 2024-04-26 PROCEDURE — 74183 MRI ABD W/O CNTR FLWD CNTR: CPT

## 2024-04-26 PROCEDURE — 83690 ASSAY OF LIPASE: CPT | Performed by: HOSPITALIST

## 2024-04-26 PROCEDURE — 83880 ASSAY OF NATRIURETIC PEPTIDE: CPT | Performed by: STUDENT IN AN ORGANIZED HEALTH CARE EDUCATION/TRAINING PROGRAM

## 2024-04-26 PROCEDURE — 250N000011 HC RX IP 250 OP 636: Performed by: STUDENT IN AN ORGANIZED HEALTH CARE EDUCATION/TRAINING PROGRAM

## 2024-04-26 PROCEDURE — A9585 GADOBUTROL INJECTION: HCPCS | Performed by: STUDENT IN AN ORGANIZED HEALTH CARE EDUCATION/TRAINING PROGRAM

## 2024-04-26 PROCEDURE — 87040 BLOOD CULTURE FOR BACTERIA: CPT | Performed by: STUDENT IN AN ORGANIZED HEALTH CARE EDUCATION/TRAINING PROGRAM

## 2024-04-26 PROCEDURE — 80074 ACUTE HEPATITIS PANEL: CPT | Performed by: HOSPITALIST

## 2024-04-26 PROCEDURE — 250N000011 HC RX IP 250 OP 636: Performed by: HOSPITALIST

## 2024-04-26 PROCEDURE — 84145 PROCALCITONIN (PCT): CPT | Performed by: STUDENT IN AN ORGANIZED HEALTH CARE EDUCATION/TRAINING PROGRAM

## 2024-04-26 PROCEDURE — 82805 BLOOD GASES W/O2 SATURATION: CPT | Performed by: STUDENT IN AN ORGANIZED HEALTH CARE EDUCATION/TRAINING PROGRAM

## 2024-04-26 RX ORDER — CALCIUM CARBONATE 500 MG/1
1000 TABLET, CHEWABLE ORAL 4 TIMES DAILY PRN
Status: DISCONTINUED | OUTPATIENT
Start: 2024-04-26 | End: 2024-04-30 | Stop reason: HOSPADM

## 2024-04-26 RX ORDER — LIDOCAINE 40 MG/G
CREAM TOPICAL
Status: DISCONTINUED | OUTPATIENT
Start: 2024-04-26 | End: 2024-04-30 | Stop reason: HOSPADM

## 2024-04-26 RX ORDER — METFORMIN HCL 500 MG
500 TABLET, EXTENDED RELEASE 24 HR ORAL 2 TIMES DAILY WITH MEALS
Status: DISCONTINUED | OUTPATIENT
Start: 2024-04-26 | End: 2024-04-30 | Stop reason: HOSPADM

## 2024-04-26 RX ORDER — ATORVASTATIN CALCIUM 40 MG/1
40 TABLET, FILM COATED ORAL DAILY
Status: DISCONTINUED | OUTPATIENT
Start: 2024-04-26 | End: 2024-04-30 | Stop reason: HOSPADM

## 2024-04-26 RX ORDER — HEPARIN SODIUM 5000 [USP'U]/.5ML
5000 INJECTION, SOLUTION INTRAVENOUS; SUBCUTANEOUS EVERY 8 HOURS
Status: DISCONTINUED | OUTPATIENT
Start: 2024-04-26 | End: 2024-04-29

## 2024-04-26 RX ORDER — DIPHENHYDRAMINE HYDROCHLORIDE 50 MG/ML
25 INJECTION INTRAMUSCULAR; INTRAVENOUS ONCE
Status: COMPLETED | OUTPATIENT
Start: 2024-04-26 | End: 2024-04-26

## 2024-04-26 RX ORDER — PROCHLORPERAZINE MALEATE 5 MG
5 TABLET ORAL EVERY 6 HOURS PRN
Status: DISCONTINUED | OUTPATIENT
Start: 2024-04-26 | End: 2024-04-30 | Stop reason: HOSPADM

## 2024-04-26 RX ORDER — AMOXICILLIN 250 MG
2 CAPSULE ORAL 2 TIMES DAILY PRN
Status: DISCONTINUED | OUTPATIENT
Start: 2024-04-26 | End: 2024-04-30 | Stop reason: HOSPADM

## 2024-04-26 RX ORDER — AMOXICILLIN 250 MG
1 CAPSULE ORAL 2 TIMES DAILY PRN
Status: DISCONTINUED | OUTPATIENT
Start: 2024-04-26 | End: 2024-04-30 | Stop reason: HOSPADM

## 2024-04-26 RX ORDER — DIPHENHYDRAMINE HYDROCHLORIDE 50 MG/ML
25 INJECTION INTRAMUSCULAR; INTRAVENOUS EVERY 6 HOURS PRN
Status: DISCONTINUED | OUTPATIENT
Start: 2024-04-26 | End: 2024-04-30 | Stop reason: HOSPADM

## 2024-04-26 RX ORDER — TORSEMIDE 20 MG/1
20 TABLET ORAL DAILY
Status: DISCONTINUED | OUTPATIENT
Start: 2024-04-26 | End: 2024-04-30 | Stop reason: HOSPADM

## 2024-04-26 RX ORDER — GUAIFENESIN/DEXTROMETHORPHAN 100-10MG/5
10 SYRUP ORAL EVERY 4 HOURS PRN
Status: DISCONTINUED | OUTPATIENT
Start: 2024-04-26 | End: 2024-04-30 | Stop reason: HOSPADM

## 2024-04-26 RX ORDER — DIPHENHYDRAMINE HCL 25 MG
25 CAPSULE ORAL EVERY 6 HOURS PRN
Status: DISCONTINUED | OUTPATIENT
Start: 2024-04-26 | End: 2024-04-30 | Stop reason: HOSPADM

## 2024-04-26 RX ORDER — PROCHLORPERAZINE 25 MG
12.5 SUPPOSITORY, RECTAL RECTAL EVERY 12 HOURS PRN
Status: DISCONTINUED | OUTPATIENT
Start: 2024-04-26 | End: 2024-04-30 | Stop reason: HOSPADM

## 2024-04-26 RX ORDER — GABAPENTIN 300 MG/1
600 CAPSULE ORAL 3 TIMES DAILY PRN
Status: ON HOLD | COMMUNITY
End: 2024-05-02

## 2024-04-26 RX ORDER — HEPARIN SODIUM 5000 [USP'U]/.5ML
5000 INJECTION, SOLUTION INTRAVENOUS; SUBCUTANEOUS EVERY 8 HOURS
Status: DISCONTINUED | OUTPATIENT
Start: 2024-04-26 | End: 2024-04-26

## 2024-04-26 RX ORDER — HYDROCORTISONE 2.5 %
CREAM (GRAM) TOPICAL 2 TIMES DAILY PRN
Status: DISCONTINUED | OUTPATIENT
Start: 2024-04-26 | End: 2024-04-30 | Stop reason: HOSPADM

## 2024-04-26 RX ORDER — DEXTROSE MONOHYDRATE 25 G/50ML
25-50 INJECTION, SOLUTION INTRAVENOUS
Status: DISCONTINUED | OUTPATIENT
Start: 2024-04-26 | End: 2024-04-30 | Stop reason: HOSPADM

## 2024-04-26 RX ORDER — GADOBUTROL 604.72 MG/ML
9 INJECTION INTRAVENOUS ONCE
Status: COMPLETED | OUTPATIENT
Start: 2024-04-26 | End: 2024-04-26

## 2024-04-26 RX ORDER — ONDANSETRON 4 MG/1
4 TABLET, ORALLY DISINTEGRATING ORAL EVERY 6 HOURS PRN
Status: DISCONTINUED | OUTPATIENT
Start: 2024-04-26 | End: 2024-04-30 | Stop reason: HOSPADM

## 2024-04-26 RX ORDER — SODIUM CHLORIDE, SODIUM LACTATE, POTASSIUM CHLORIDE, CALCIUM CHLORIDE 600; 310; 30; 20 MG/100ML; MG/100ML; MG/100ML; MG/100ML
INJECTION, SOLUTION INTRAVENOUS CONTINUOUS
Status: DISCONTINUED | OUTPATIENT
Start: 2024-04-26 | End: 2024-04-27

## 2024-04-26 RX ORDER — LISINOPRIL 10 MG/1
10 TABLET ORAL EVERY 24 HOURS
Status: DISCONTINUED | OUTPATIENT
Start: 2024-04-26 | End: 2024-04-30 | Stop reason: HOSPADM

## 2024-04-26 RX ORDER — NICOTINE POLACRILEX 4 MG
15-30 LOZENGE BUCCAL
Status: DISCONTINUED | OUTPATIENT
Start: 2024-04-26 | End: 2024-04-30 | Stop reason: HOSPADM

## 2024-04-26 RX ORDER — ASPIRIN 81 MG/1
81 TABLET ORAL DAILY
Status: DISCONTINUED | OUTPATIENT
Start: 2024-04-26 | End: 2024-04-30 | Stop reason: HOSPADM

## 2024-04-26 RX ORDER — LORATADINE 10 MG/1
10 TABLET ORAL DAILY
Status: DISCONTINUED | OUTPATIENT
Start: 2024-04-26 | End: 2024-04-30 | Stop reason: HOSPADM

## 2024-04-26 RX ORDER — ISOSORBIDE MONONITRATE 30 MG/1
30 TABLET, EXTENDED RELEASE ORAL DAILY
Status: DISCONTINUED | OUTPATIENT
Start: 2024-04-26 | End: 2024-04-30 | Stop reason: HOSPADM

## 2024-04-26 RX ORDER — ONDANSETRON 2 MG/ML
4 INJECTION INTRAMUSCULAR; INTRAVENOUS EVERY 6 HOURS PRN
Status: DISCONTINUED | OUTPATIENT
Start: 2024-04-26 | End: 2024-04-30 | Stop reason: HOSPADM

## 2024-04-26 RX ORDER — AMLODIPINE BESYLATE 5 MG/1
5 TABLET ORAL DAILY
Status: DISCONTINUED | OUTPATIENT
Start: 2024-04-26 | End: 2024-04-30 | Stop reason: HOSPADM

## 2024-04-26 RX ADMIN — SENNOSIDES AND DOCUSATE SODIUM 1 TABLET: 8.6; 5 TABLET ORAL at 14:52

## 2024-04-26 RX ADMIN — Medication: at 09:50

## 2024-04-26 RX ADMIN — ASPIRIN 81 MG: 81 TABLET, COATED ORAL at 14:52

## 2024-04-26 RX ADMIN — GADOBUTROL 9 ML: 604.72 INJECTION INTRAVENOUS at 13:44

## 2024-04-26 RX ADMIN — SODIUM CHLORIDE, POTASSIUM CHLORIDE, SODIUM LACTATE AND CALCIUM CHLORIDE: 600; 310; 30; 20 INJECTION, SOLUTION INTRAVENOUS at 04:33

## 2024-04-26 RX ADMIN — HEPARIN SODIUM 5000 UNITS: 5000 INJECTION, SOLUTION INTRAVENOUS; SUBCUTANEOUS at 21:57

## 2024-04-26 RX ADMIN — LORATADINE 10 MG: 10 TABLET ORAL at 14:52

## 2024-04-26 RX ADMIN — Medication: at 16:27

## 2024-04-26 RX ADMIN — SODIUM CHLORIDE, POTASSIUM CHLORIDE, SODIUM LACTATE AND CALCIUM CHLORIDE: 600; 310; 30; 20 INJECTION, SOLUTION INTRAVENOUS at 14:17

## 2024-04-26 RX ADMIN — ISOSORBIDE MONONITRATE 30 MG: 30 TABLET, EXTENDED RELEASE ORAL at 14:52

## 2024-04-26 RX ADMIN — DIPHENHYDRAMINE HYDROCHLORIDE 25 MG: 50 INJECTION INTRAMUSCULAR; INTRAVENOUS at 08:44

## 2024-04-26 RX ADMIN — AMLODIPINE BESYLATE 5 MG: 5 TABLET ORAL at 14:52

## 2024-04-26 RX ADMIN — HYDROCORTISONE 1 APPLICATOR: 25 CREAM TOPICAL at 05:37

## 2024-04-26 RX ADMIN — DIPHENHYDRAMINE HYDROCHLORIDE 25 MG: 50 INJECTION INTRAMUSCULAR; INTRAVENOUS at 01:42

## 2024-04-26 RX ADMIN — SODIUM CHLORIDE 1000 ML: 9 INJECTION, SOLUTION INTRAVENOUS at 02:33

## 2024-04-26 RX ADMIN — SODIUM CHLORIDE 500 ML: 9 INJECTION, SOLUTION INTRAVENOUS at 01:39

## 2024-04-26 RX ADMIN — INSULIN ASPART 10 UNITS: 100 INJECTION, SOLUTION INTRAVENOUS; SUBCUTANEOUS at 02:32

## 2024-04-26 ASSESSMENT — ACTIVITIES OF DAILY LIVING (ADL)
ADLS_ACUITY_SCORE: 39
ADLS_ACUITY_SCORE: 38
ADLS_ACUITY_SCORE: 39
ADLS_ACUITY_SCORE: 40
ADLS_ACUITY_SCORE: 39
ADLS_ACUITY_SCORE: 25
ADLS_ACUITY_SCORE: 39
ADLS_ACUITY_SCORE: 40
ADLS_ACUITY_SCORE: 39
ADLS_ACUITY_SCORE: 40
ADLS_ACUITY_SCORE: 39
ADLS_ACUITY_SCORE: 25
ADLS_ACUITY_SCORE: 39
ADLS_ACUITY_SCORE: 40
ADLS_ACUITY_SCORE: 39
ADLS_ACUITY_SCORE: 39

## 2024-04-26 NOTE — ED TRIAGE NOTES
Pt states he was seen here for an allergic reaction to keflex and had a rash from it. Patient was discharged with medication and finished it today, however, the rash is still persisting. Patient also endorses SOB and son states he is looking more jaundice than normal.      Triage Assessment (Adult)       Row Name 04/25/24 6761          Triage Assessment    Airway WDL WDL        Respiratory WDL    Respiratory WDL WDL        Skin Circulation/Temperature WDL    Skin Circulation/Temperature WDL WDL        Cardiac WDL    Cardiac WDL WDL        Peripheral/Neurovascular WDL    Peripheral Neurovascular WDL WDL        Cognitive/Neuro/Behavioral WDL    Cognitive/Neuro/Behavioral WDL WDL

## 2024-04-26 NOTE — ED PROVIDER NOTES
EMERGENCY DEPARTMENT ENCOUNTER      NAME: Lance Ibrahim  AGE: 79 year old male  YOB: 1945  MRN: [id number]  EVALUATION DATE & TIME: 4/25/2024 10:48 PM    PCP: Rickey Adamson    ED PROVIDER: Maya Esteves M.D.      Chief Complaint   Patient presents with    Allergic Reaction         FINAL IMPRESSION:  1. Diffuse papular rash    2. Elevated LFTs    3. Elevated lipase    4. Hyperglycemia        MEDICAL DECISION MAKING:    Pertinent Labs & Imaging studies reviewed. (See chart for details)  ED Course as of 04/26/24 0427   Thu Apr 25, 2024   2314 Afebrile.  Vital signs here with tachycardia, otherwise unremarkable.  Patient coming to the emerged part today for evaluation of pruritus.  States he was seen recently for possible allergic reaction to Keflex.  No rash.  Has seen his primary care doctor for this as well.  Had a rash that started initially on his scalp and face and now has spread downward to his chest, arms, back.  He does few scattered lesions on his right lower extremity.  Spares his axilla, the palms and soles.  No involvement of his mucosal membranes.  No involvement of his mouth.  He states that this is significantly uncomfortable and he has been itching fairly nonstop.  Is taking famotidine, Atarax, Medrol Dosepak without relief.  He now states that he feels that the rash is spreading, his new lesions erupting on his back.  He does not recall any herald lesion.  States this started after he took Keflex.  He did have a spinal stimulator placed, he had the electrodes placed first with the battery pack taped to his back in the month of March but did not have any reaction to that.  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  He states that after his back surgery which was at the beginning of this month he was unable to shower for 2 weeks but now has been showering.  States that again the lesion did not start until he started the Keflex.   2318 Physical exam for  patient here with multiple diffuse well-circumscribed papule lesions measuring 1 to 3 mm.  Nonblanching.  Most open and scabbed over.  1 area on his back measuring approximately 1.5 cm across with honey crusted crusted lesion at the base.  No surrounding erythema to these areas.  Negative Nikolsky.  No purpura.  Islands of sparing without any erythema or waxing is noted to skin.  Sparing palms and soles.   2353 Considered multiple etiologies, pityriasis rubra colitis, scabies, drug reaction, atopic dermatitis, disseminated shingles but none seem to perfectly fit patient's rash distribution and symptoms.   2354 Will try Lyrica here for his itching symptoms to see if this does help.   Fri Apr 26, 2024   0056 Patient's blood work back with significant elevation of his LFTs, elevated bilirubin 4.9, lipase up to also at 495.  Patient without any discomfort or pain with palpation in his abdomen.  Factors could be also worsening his symptoms of pruritus.  Will get ultrasound right upper quadrant.  Kidney function here also elevated from baseline, 2.17 currently, baseline is about 1.5.   0139 Blood sugar here is significantly elevated as well in the upper 400s.  No evidence for DKA.  No anion gap.  Is getting fluids, 10 units of subcu insulin.     Ultrasound here without any significant acute abnormality.  Dry CT without acute abnormality.    Medical Decision Making  Obtained supplemental history:Supplemental history obtained?: Documented in chart  Reviewed external records: External records reviewed?: Documented in chart  Care impacted by chronic illness:Chronic Kidney Disease, Diabetes, Heart Disease, and Hypertension  Care significantly affected by social determinants of health:N/A  Did you consider but not order tests?: Work up considered but not performed and documented in chart, if applicable  Did you interpret images independently?: Independent interpretation of ECG and images noted in documentation, when  applicable.  Consultation discussion with other provider:Did you involve another provider (consultant, MH, pharmacy, etc.)?: No  Admit.      Critical care: 0 minutes excluding separately billable procedures.  Includes bedside management, time reviewing test results, review of records, discussing the case with staff, documenting the medical record and time spent with family members (or surrogate decision makers) discussing specific treatment issues.          ED COURSE:  10:42 PM I met with the patient to gather history and to perform my initial exam. We discussed plans for the ED course, including diagnostic testing and treatment.   1:24 AM I rechecked and updated the patient with results.  2:48 AM I discussed the case with hospitalist, Dr. Hinton, who accepts the patient for admission.    The importance of close follow up was discussed. We reviewed warning signs and symptoms, and I instructed Mr. Ibrahim to return to the emergency department immediately if he develops any new or worsening symptoms. I provided additional verbal discharge instructions. Mr. Ibrahim expressed understanding and agreement with this plan of care, his questions were answered, and he was discharged in stable condition.     MEDICATIONS GIVEN IN THE EMERGENCY:  Medications   lidocaine 1 % 0.1-1 mL (has no administration in time range)   lidocaine (LMX4) cream (has no administration in time range)   sodium chloride (PF) 0.9% PF flush 3 mL (has no administration in time range)   sodium chloride (PF) 0.9% PF flush 3 mL (has no administration in time range)   senna-docusate (SENOKOT-S/PERICOLACE) 8.6-50 MG per tablet 1 tablet (has no administration in time range)     Or   senna-docusate (SENOKOT-S/PERICOLACE) 8.6-50 MG per tablet 2 tablet (has no administration in time range)   calcium carbonate (TUMS) chewable tablet 1,000 mg (has no administration in time range)   lactated ringers infusion (has no administration in time range)   ondansetron (ZOFRAN  ODT) ODT tab 4 mg (has no administration in time range)     Or   ondansetron (ZOFRAN) injection 4 mg (has no administration in time range)   prochlorperazine (COMPAZINE) injection 5 mg (has no administration in time range)     Or   prochlorperazine (COMPAZINE) tablet 5 mg (has no administration in time range)     Or   prochlorperazine (COMPAZINE) suppository 12.5 mg (has no administration in time range)   benzocaine-menthol (CEPACOL) 15-3.6 MG lozenge 1 lozenge (has no administration in time range)   guaiFENesin-dextromethorphan (ROBITUSSIN DM) 100-10 MG/5ML syrup 10 mL (has no administration in time range)   glucose gel 15-30 g (has no administration in time range)     Or   dextrose 50 % injection 25-50 mL (has no administration in time range)     Or   glucagon injection 1 mg (has no administration in time range)   insulin aspart (NovoLOG) injection (RAPID ACTING) (has no administration in time range)   insulin aspart (NovoLOG) injection (RAPID ACTING) (has no administration in time range)   pregabalin (LYRICA) capsule 25 mg (25 mg Oral $Given 4/25/24 2354)   sodium chloride 0.9% BOLUS 500 mL (0 mLs Intravenous Stopped 4/26/24 0229)   diphenhydrAMINE (BENADRYL) injection 25 mg (25 mg Intravenous $Given 4/26/24 0142)   sodium chloride 0.9% BOLUS 1,000 mL (0 mLs Intravenous Stopped 4/26/24 0422)   insulin aspart (NovoLOG) injection (RAPID ACTING) (10 Units Subcutaneous $Given 4/26/24 0232)       NEW PRESCRIPTIONS STARTED AT TODAY'S ER VISIT:  New Prescriptions    No medications on file          =================================================================    HPI    Patient information was obtained from: patient and son    Use of : N/A         Lance Ibrahim is a 79 year old male who presents with full body hives.    Patient developed full body hives 4 days into starting a Keflex that was prescribed on 4/03/24. Per chart review, he was evaluated by PCP on 4/17/24 with ongoing hives and severe itching. He  was discharged on Medrol Dosepak and Atarax for itching. Patient reports ongoing itchy red rashes on his face, chest, abdomen, bilateral arms, and back despite completing his prednisone and atarax course. Patient additionally notes new onset of shortness of breath and his son states his skin and bilateral eyes have been yellow/green, which is abnormal for the patient. Patient denies recent falls. No nausea or vomiting. No other medical concerns are expressed at this time.               REVIEW OF SYSTEMS   Review of Systems  See HPI.    PAST MEDICAL HISTORY:  Past Medical History:   Diagnosis Date    Anemia     Arthritis     osteoarthritis knees    BPH     CAD (coronary artery disease)     subtotal occlusion in the small distal LAD     Cardiomyopathy     Cardiomyopathy (H)     Cerebral artery occlusion with cerebral infarction     TIA 1993, no residual    Cervicalgia     Chronic kidney disease     Chronic low back pain     Chronic rhinitis     Chronic rhinitis     Gouty arthropathy     HTN (hypertension)     Hyperlipidemia     Hypertension     Kidney stone     Nocturia     Obesity     PVD (peripheral vascular disease)     Sleep apnea     doesn't use cpap    TIA (transient ischaemic attack) 1993    Type 2 diabetes mellitus - 11/08/2018    Ureteral stone        PAST SURGICAL HISTORY:  Past Surgical History:   Procedure Laterality Date    AMPUTATE FOOT Right 08/07/2023    Procedure: AMPUTATION, right hallux;  Surgeon: Nakul Salazar DPM;  Location: [facility] OR    AMPUTATE TOE(S) Left 10/15/2018    Procedure: AMPUTATE TOE(S);  Left third toe amputation;  Surgeon: Ayaka Azevedo DPM, Podiatry/Foot and Ankle Surgery;  Location:  OR    ARTHROPLASTY KNEE Right 12/07/2018    Procedure: Right total knee arthroplasty;  Surgeon: Issa Cunha MD;  Location:  OR    COLONOSCOPY  03/09/2013    Procedure: COLONOSCOPY;  COLONOSCOPY;  Surgeon: Chau Hogan MD;  Location:  GI    CV HEART CATHETERIZATION  WITH POSSIBLE INTERVENTION Left 03/05/2019    Procedure: Coronary Angiogram;  Surgeon: Nas Linda MD;  Location:  HEART CARDIAC CATH LAB    Diastasis recti repair  1985    EXTRACAPSULAR CATARACT EXTRATION WITH INTRAOCULAR LENS IMPLANT Left 03/13/2017    EXTRACAPSULAR CATARACT EXTRATION WITH INTRAOCULAR LENS IMPLANT Right     FOOT SURGERY  04/2013    cyst removal     HERNIA REPAIR  07/13/2004    ventral     ORIF Shoulder Left     urolift  01/15/2024    Ventral hernia repair NOS  1987       CURRENT MEDICATIONS:      Current Facility-Administered Medications:     benzocaine-menthol (CEPACOL) 15-3.6 MG lozenge 1 lozenge, 1 lozenge, Buccal, Q1H PRN, Rickey Hinton,     calcium carbonate (TUMS) chewable tablet 1,000 mg, 1,000 mg, Oral, 4x Daily PRN, Rickey Hinton,     glucose gel 15-30 g, 15-30 g, Oral, Q15 Min PRN **OR** dextrose 50 % injection 25-50 mL, 25-50 mL, Intravenous, Q15 Min PRN **OR** glucagon injection 1 mg, 1 mg, Subcutaneous, Q15 Min PRN, Rickey Hinton, DO    guaiFENesin-dextromethorphan (ROBITUSSIN DM) 100-10 MG/5ML syrup 10 mL, 10 mL, Oral, Q4H PRN, Rickey Hinton,     insulin aspart (NovoLOG) injection (RAPID ACTING), 1-7 Units, Subcutaneous, TID AC, Rickey Hinton,     insulin aspart (NovoLOG) injection (RAPID ACTING), 1-5 Units, Subcutaneous, At Bedtime, Rickey Hinton,     lactated ringers infusion, , Intravenous, Continuous, Rickey Hinton, DO    lidocaine (LMX4) cream, , Topical, Q1H PRN, Rickey Hinton,     lidocaine 1 % 0.1-1 mL, 0.1-1 mL, Other, Q1H PRN, Rickey Hinton,     ondansetron (ZOFRAN ODT) ODT tab 4 mg, 4 mg, Oral, Q6H PRN **OR** ondansetron (ZOFRAN) injection 4 mg, 4 mg, Intravenous, Q6H PRN, Rickey Hinton, DO    prochlorperazine (COMPAZINE) injection 5 mg, 5 mg, Intravenous, Q6H PRN **OR** prochlorperazine (COMPAZINE) tablet 5 mg, 5 mg, Oral, Q6H PRN **OR** prochlorperazine (COMPAZINE) suppository 12.5 mg, 12.5 mg, Rectal, Q12H PRN, Rickey Hinton, DO     senna-docusate (SENOKOT-S/PERICOLACE) 8.6-50 MG per tablet 1 tablet, 1 tablet, Oral, BID PRN **OR** senna-docusate (SENOKOT-S/PERICOLACE) 8.6-50 MG per tablet 2 tablet, 2 tablet, Oral, BID PRN, Rickey Hinton, DO    sodium chloride (PF) 0.9% PF flush 3 mL, 3 mL, Intracatheter, Q8H, Rickey Hinton, DO    sodium chloride (PF) 0.9% PF flush 3 mL, 3 mL, Intracatheter, q1 min prn, Rickey Hinton, DO    Current Outpatient Medications:     amLODIPine (NORVASC) 5 MG tablet, TAKE 1 TABLET DAILY, Disp: 90 tablet, Rfl: 3    aspirin (ASA) 81 MG EC tablet, Take by mouth every 24 hours, Disp: , Rfl:     atorvastatin (LIPITOR) 40 MG tablet, TAKE 1 TABLET EVERY 24 HOURS, Disp: 90 tablet, Rfl: 0    blood glucose monitoring (NO BRAND SPECIFIED) meter device kit, Use to test blood sugar 2 times daily or as directed., Disp: 1 kit, Rfl: 0    calcium carbonate (TUMS) 500 MG chewable tablet, Take 1,000 mg by mouth, Disp: , Rfl:     colchicine (COLCYRS) 0.6 MG tablet, TAKE 1 TABLET DAILY, Disp: 90 tablet, Rfl: 3    CONTOUR NEXT TEST test strip, USE TO TEST BLOOD SUGAR 2 TIMES DAILY OR AS DIRECTED., Disp: 100 strip, Rfl: 0    diclofenac (VOLTAREN) 1 % topical gel, Apply 4 g topically 4 times daily, Disp: , Rfl:     famotidine (PEPCID) 20 MG tablet, Take 1 tablet (20 mg) by mouth 2 times daily as needed (itching), Disp: 30 tablet, Rfl: 0    Ferrous Gluconate 240 (27 Fe) MG TABS, Take 1 tablet by mouth daily , Disp: , Rfl:     finasteride (PROSCAR) 5 MG tablet, Take 5 mg by mouth daily, Disp: , Rfl:     fluticasone (FLONASE) 50 MCG/ACT nasal spray, Spray 1-2 sprays in nostril every 24 hours, Disp: , Rfl:     gabapentin (NEURONTIN) 300 MG capsule, Take 1 capsule (300 mg) by mouth 3 times daily, Disp: 270 capsule, Rfl: 0    hydrOXYzine HCl (ATARAX) 10 MG tablet, Take 1 tablet (10 mg) by mouth 3 times daily as needed for itching, Disp: 20 tablet, Rfl: 0    hydrOXYzine HCl (ATARAX) 25 MG tablet, Take 1 tablet (25 mg) by mouth 3 times daily as  "needed for itching, Disp: 20 tablet, Rfl: 0    isosorbide mononitrate (IMDUR) 30 MG 24 hr tablet, TAKE 1 TABLET DAILY, Disp: 90 tablet, Rfl: 0    Lidocaine (LIDOCARE) 4 % Patch, Place 1 patch onto the skin every 24 hours To prevent lidocaine toxicity, patient should be patch free for 12 hrs daily., Disp: , Rfl:     lisinopril (ZESTRIL) 10 MG tablet, Take 1 tablet (10 mg) by mouth every 24 hours, Disp: 90 tablet, Rfl: 3    loratadine (CLARITIN) 10 MG tablet, Take 10 mg by mouth daily as needed for allergies, Disp: , Rfl:     medical cannabis (Patient's own supply), 5mg at night, Disp: , Rfl:     melatonin 5 MG CAPS, Take 5 mg by mouth at bedtime, Disp: , Rfl:     metFORMIN (GLUCOPHAGE XR) 500 MG 24 hr tablet, Take 1 tablet (500 mg) by mouth 2 times daily (with meals), Disp: 180 tablet, Rfl: 2    methylPREDNISolone (MEDROL DOSEPAK) 4 MG tablet therapy pack, Follow Package Directions, Disp: 21 tablet, Rfl: 0    Microlet Lancets MISC, USE TO TEST BLOOD SUGAR TWICE A DAY OR AS DIRECTED, Disp: 100 each, Rfl: 1    multivitamin w/minerals (THERA-VIT-M) tablet, Take 1 tablet by mouth daily, Disp: , Rfl:     nortriptyline (PAMELOR) 10 MG capsule, Take 20 mg by mouth at bedtime, Disp: , Rfl:     Probiotic Product (FORTIFY 30 BILLION PROBIOT 50+) CPDR, Take 1 5 Pack by mouth daily, Disp: 90 capsule, Rfl: 1    Semaglutide (RYBELSUS) 14 MG tablet, Take 14 mg by mouth daily, Disp: 30 tablet, Rfl: 3    tamsulosin (FLOMAX) 0.4 MG capsule, Take 0.4 mg by mouth daily, Disp: , Rfl:     torsemide (DEMADEX) 20 MG tablet, TAKE 1 TABLET DAILY, Disp: 90 tablet, Rfl: 3    ALLERGIES:  Allergies   Allergen Reactions    Penicillins Anaphylaxis     22 - tolerated cefepime     Sulfa Antibiotics      \"itchy rash, swelling of face and hands\"    Ancef [Cefazolin] Rash    Cephalexin Itching and Rash       FAMILY HISTORY:  Family History   Problem Relation Age of Onset    Family History Negative Mother          86 yo    Cancer Father      "     74 yo brain    Diabetes Maternal Grandfather          89 yo    Alcohol/Drug Paternal Grandfather             Colon Cancer No family hx of        SOCIAL HISTORY:   Social History     Socioeconomic History    Marital status:    Occupational History     Employer: SELF   Tobacco Use    Smoking status: Former     Current packs/day: 0.00     Types: Cigarettes     Quit date: 3/17/1993     Years since quittin.1    Smokeless tobacco: Never   Vaping Use    Vaping status: Never Used   Substance and Sexual Activity    Alcohol use: Not Currently    Drug use: No    Sexual activity: Never     Social Determinants of Health     Interpersonal Safety: Low Risk  (3/27/2024)    Interpersonal Safety     Do you feel physically and emotionally safe where you currently live?: Yes     Within the past 12 months, have you been hit, slapped, kicked or otherwise physically hurt by someone?: No     Within the past 12 months, have you been humiliated or emotionally abused in other ways by your partner or ex-partner?: No       PHYSICAL EXAM:    Vitals: BP (!) 142/68   Pulse 83   Resp 20   SpO2 98%    General:. Alert and interactive, comfortable appearing.  HENT: Oropharynx without erythema or exudates. MMM.  TMs clear bilaterally.  Eyes: Pupils mid-sized and equally reactive.   Neck: Full AROM.  No midline tenderness to palpation.  Cardiovascular: Regular rate and rhythm. Peripheral pulses 2+ bilaterally.  Chest/Pulmonary: Normal work of breathing. Lung sounds clear and equal throughout, no wheezes or crackles. No chest wall tenderness or deformities.  Abdomen: Soft, nondistended. Nontender without guarding or rebound.  Back/Spine: No CVA or midline tenderness.  Extremities: Normal ROM of all major joints. No lower extremity edema.   Skin: Multiple diffuse well-circumscribed papule lesions measuring 1 to 3 mm.  Nonblanching.  Most open and scabbed over.  1 area on his back measuring approximately 1.5 cm  across with honey crusted crusted lesion at the base.  No surrounding erythema to these areas.  Negative Nikolsky.  No purpura.  Islands of sparing without any erythema or waxing is noted to skin.  Sparing palms and soles.  Neuro: Speech clear. CNs grossly intact. Moves all extremities appropriately. Strength and sensation grossly intact to all extremities.   Psych: Normal affect/mood, cooperative, memory appropriate.     LAB:  All pertinent labs reviewed and interpreted.  Labs Ordered and Resulted from Time of ED Arrival to Time of ED Departure   COMPREHENSIVE METABOLIC PANEL - Abnormal       Result Value    Sodium 132 (*)     Potassium 4.4      Carbon Dioxide (CO2) 15 (*)     Anion Gap 13      Urea Nitrogen 56.3 (*)     Creatinine 2.17 (*)     GFR Estimate 30 (*)     Calcium 9.4      Chloride 104      Glucose 482 (*)     Alkaline Phosphatase 619 (*)      (*)      (*)     Protein Total 7.7      Albumin 3.7      Bilirubin Total 4.9 (*)    LIPASE - Abnormal    Lipase 495 (*)    CBC WITH PLATELETS AND DIFFERENTIAL - Abnormal    WBC Count 10.6      RBC Count 4.42      Hemoglobin 12.7 (*)     Hematocrit 38.2 (*)     MCV 86      MCH 28.7      MCHC 33.2      RDW 18.5 (*)     Platelet Count 252      % Neutrophils 67      % Lymphocytes 20      % Monocytes 8      % Eosinophils 2      % Basophils 1      % Immature Granulocytes 2      NRBCs per 100 WBC 0      Absolute Neutrophils 7.1      Absolute Lymphocytes 2.1      Absolute Monocytes 0.8      Absolute Eosinophils 0.2      Absolute Basophils 0.1      Absolute Immature Granulocytes 0.3      Absolute NRBCs 0.0     CRP INFLAMMATION - Abnormal    CRP Inflammation 6.00 (*)    CK TOTAL - Normal         N TERMINAL PRO BNP OUTPATIENT - Normal    N Terminal Pro BNP Outpatient 1,078     COMPREHENSIVE METABOLIC PANEL   LIPASE   INR   ACUTE HEPATITIS PANEL   ACETAMINOPHEN LEVEL   GLUCOSE MONITOR NURSING POCT   GLUCOSE MONITOR NURSING POCT   GLUCOSE MONITOR NURSING  POCT   GLUCOSE MONITOR NURSING POCT   GLUCOSE MONITOR NURSING POCT       RADIOLOGY:  CT Abdomen Pelvis w/o Contrast   Final Result   IMPRESSION:    1.  Left lower lobe nodule or nodular infiltrate, incompletely imaged on this exam. Follow-up recommended.   2.  No other acute abnormality.   3.  Colonic diverticula without acute diverticulitis. No bowel obstruction or inflammation.         US Abdomen Limited   Final Result   IMPRESSION:   1.  Biliary dilatation without cause of obstruction seen.   2.  Fatty infiltration of the liver.                  I, Nancy Oleary, am serving as a scribe to document services personally performed by Dr. Maya Esteves  based on my observation and the provider's statements to me. I, Maya Esteves MD attest that Nancy Oleary is acting in a scribe capacity, has observed my performance of the services and has documented them in accordance with my direction.      Maya Esteves M.D.  Emergency Medicine  [facility] EMERGENCY ROOM  [address] [place] [id number] [phone]  Dept: [phone]       Maya Esteves MD  04/26/24 0428

## 2024-04-26 NOTE — PLAN OF CARE
Problem: Adult Inpatient Plan of Care  Goal: Optimal Comfort and Wellbeing  4/26/2024 1526 by Abiel Cid, RN  Outcome: Progressing  4/26/2024 1526 by Abiel Cid, RN  Outcome: Progressing   Goal Outcome Evaluation:       Pt A&O x4 and denies pain, SOB, and chest pain. Currently running LR@ 75ml/hr. Pt unable to verbalized last BM. Prn senna given. Rash, open areas, noted all over body except palms and feet and denies itching at this time.  at lunch time. Call light within reach and calls appropriately.

## 2024-04-26 NOTE — PROVIDER NOTIFICATION
Pt reports doesn't wear CPAP at home and doesn't need one. Writer discontinued the CPAP.    Naldo Jade, RT

## 2024-04-26 NOTE — H&P
[facility] MEDICINE ADMISSION HISTORY AND PHYSICAL     Brief Synopsis:     Lance Ibrahim is a 79 year old male who presented with complaints of rash, jaundice.    Medical history is notable for recent allergic rxn to keflex, started on prednisone. Also with hx of CAD, CHF, stroke, bph, htn, obesity, hyperlipidemia, diabetes.    Initial evaluation revealed slight htn, tachycardia, creat of 2.17, CO2 of 15, elevated LFTs, elevated lipase, bilirubin. Unremarkable CBC. RUQ US with biliary dilatation without obvious cause.    Initial treatment included lyrica, novolog, IV fluids    Assessment and Plan:  Acute hepatitis  Dilated CBD, gallbladder, RUQ pain, hyperbili  Diffuse rash  Unclear etiology, initially thought to be allergy to keflex but only one dose 3 weeks ago  Continue steroids, benadryl  Wonder if hyperbili is cause of itching  Consult GI  Hold statin, NSAIDs  Defer to GI regarding further imaging  Check hepatitis panel    DEAN  CKD  Normal anion gap met acidosis  Hold NSAIDs  Treat with fluids  Trend bmp, urine output  Watch for hypervolemia given CHF hx    DM neuropathy  Chronic pain  Stimulator placed early this month    DM2  Sliding scale insulin prn  Typically on metformin, rybelsus    Incidental pulmonary nodule  Will need outpt surveillance    Gout  BPH  Hx of CHF    Clinically Significant Risk Factors Present on Admission                # Drug Induced Platelet Defect: home medication list includes an antiplatelet medication   # Hypertension: Noted on problem list     # DMII: A1C = 8.0 % (Ref range: 0.0 - 5.6 %) within past 6 months    # Obesity: Estimated body mass index is 30.11 kg/m  as calculated from the following:    Height as of 4/17/24: 1.829 m (6').    Weight as of 4/17/24: 100.7 kg (222 lb).                DVTP: Mechanical Prophylaxis/ Sequential Compression Devices  Code Status: Prior  Disposition: Inpatient   Diet: clear liquid  Fluids: LR at 75/hr    Disposition Plan       Expected Discharge Date: 04/28/2024               Chief Complaint Rash, jaundice     HISTORY   Lance Ibrahim is a 79 year old male who presented with complaints of rash and jaundice.    Per ED provider:  Lance Ibrahim is a 79 year old male who presents with full body hives.     Patient developed full body hives 4 days into starting a Keflex that was prescribed on 4/03/24. Per chart review, he was evaluated by PCP on 4/17/24 with ongoing hives and severe itching. He was discharged on Medrol Dosepak and Atarax for itching. Patient reports ongoing itchy red rashes on his face, chest, abdomen, bilateral arms, and back despite completing his prednisone and atarax course. Patient additionally notes new onset of shortness of breath and his son states his skin and bilateral eyes have been yellow/green, which is abnormal for the patient. Patient denies recent falls. No nausea or vomiting. No other medical concerns are expressed at this time.     No fever, chills.  No cough or chest pain.  He has had some shortness of breath.  Has not really noticed much abdominal pain.  No dysuria or lower extremity edema.  Really only complaint is severe diffuse itching making it difficult for him to sleep.  Past Medical History     Past Medical History:  No date: Anemia  No date: Arthritis      Comment:  osteoarthritis knees  No date: BPH  No date: CAD (coronary artery disease)      Comment:  subtotal occlusion in the small distal LAD   No date: Cardiomyopathy  No date: Cardiomyopathy (H)  No date: Cerebral artery occlusion with cerebral infarction      Comment:  TIA 1993, no residual  No date: Cervicalgia  No date: Chronic kidney disease  No date: Chronic low back pain  No date: Chronic rhinitis  No date: Chronic rhinitis  No date: Gouty arthropathy  No date: HTN (hypertension)  No date: Hyperlipidemia  No date: Hypertension  No date: Kidney stone  No date: Nocturia  No date: Obesity  No date: PVD (peripheral vascular disease)  No  date: Sleep apnea      Comment:  doesn't use cpap  1993: TIA (transient ischaemic attack)  2018: Type 2 diabetes mellitus -  No date: Ureteral stone     Surgical History     Past Surgical History:   Procedure Laterality Date    AMPUTATE FOOT Right 2023    Procedure: AMPUTATION, right hallux;  Surgeon: Nakul Salazar DPM;  Location: [facility] Main OR    AMPUTATE TOE(S) Left 10/15/2018    Procedure: AMPUTATE TOE(S);  Left third toe amputation;  Surgeon: Ayaka Azevedo DPM, Podiatry/Foot and Ankle Surgery;  Location: [facility] OR    ARTHROPLASTY KNEE Right 2018    Procedure: Right total knee arthroplasty;  Surgeon: Issa Cunha MD;  Location:  OR    COLONOSCOPY  2013    Procedure: COLONOSCOPY;  COLONOSCOPY;  Surgeon: Chau Hogan MD;  Location:  GI    CV HEART CATHETERIZATION WITH POSSIBLE INTERVENTION Left 2019    Procedure: Coronary Angiogram;  Surgeon: Nas Linda MD;  Location:  HEART CARDIAC CATH LAB    Diastasis recti repair  1985    EXTRACAPSULAR CATARACT EXTRATION WITH INTRAOCULAR LENS IMPLANT Left 2017    EXTRACAPSULAR CATARACT EXTRATION WITH INTRAOCULAR LENS IMPLANT Right     FOOT SURGERY  2013    cyst removal     HERNIA REPAIR  2004    ventral     ORIF Shoulder Left     urolift  01/15/2024    Ventral hernia repair NOS  1987     Family History      Family History   Problem Relation Age of Onset    Family History Negative Mother          88 yo    Cancer Father          76 yo brain    Diabetes Maternal Grandfather          91 yo    Alcohol/Drug Paternal Grandfather             Colon Cancer No family hx of       Social History      Social History     Tobacco Use    Smoking status: Former     Current packs/day: 0.00     Types: Cigarettes     Quit date: 3/17/1993     Years since quittin.1    Smokeless tobacco: Never   Vaping Use    Vaping status: Never Used   Substance Use Topics    Alcohol use: Not Currently  "   Drug use: No      Allergies     Allergies   Allergen Reactions    Penicillins Anaphylaxis     8/25/22 - tolerated cefepime     Sulfa Antibiotics      \"itchy rash, swelling of face and hands\"    Ancef [Cefazolin] Rash    Cephalexin Itching and Rash     Prior to Admission Medications      Prior to Admission Medications   Prescriptions Last Dose Informant Patient Reported? Taking?   CONTOUR NEXT TEST test strip   No No   Sig: USE TO TEST BLOOD SUGAR 2 TIMES DAILY OR AS DIRECTED.   Ferrous Gluconate 240 (27 Fe) MG TABS   Yes No   Sig: Take 1 tablet by mouth daily    Lidocaine (LIDOCARE) 4 % Patch   Yes No   Sig: Place 1 patch onto the skin every 24 hours To prevent lidocaine toxicity, patient should be patch free for 12 hrs daily.   Microlet Lancets MISC   No No   Sig: USE TO TEST BLOOD SUGAR TWICE A DAY OR AS DIRECTED   Probiotic Product (FORTIFY 30 BILLION PROBIOT 50+) CPDR   No No   Sig: Take 1 5 Pack by mouth daily   Semaglutide (RYBELSUS) 14 MG tablet   No No   Sig: Take 14 mg by mouth daily   amLODIPine (NORVASC) 5 MG tablet   No No   Sig: TAKE 1 TABLET DAILY   aspirin (ASA) 81 MG EC tablet   Yes No   Sig: Take by mouth every 24 hours   atorvastatin (LIPITOR) 40 MG tablet   No No   Sig: TAKE 1 TABLET EVERY 24 HOURS   blood glucose monitoring (NO BRAND SPECIFIED) meter device kit   No No   Sig: Use to test blood sugar 2 times daily or as directed.   calcium carbonate (TUMS) 500 MG chewable tablet   Yes No   Sig: Take 1,000 mg by mouth   colchicine (COLCYRS) 0.6 MG tablet   No No   Sig: TAKE 1 TABLET DAILY   diclofenac (VOLTAREN) 1 % topical gel   Yes No   Sig: Apply 4 g topically 4 times daily   famotidine (PEPCID) 20 MG tablet   No No   Sig: Take 1 tablet (20 mg) by mouth 2 times daily as needed (itching)   finasteride (PROSCAR) 5 MG tablet   Yes No   Sig: Take 5 mg by mouth daily   fluticasone (FLONASE) 50 MCG/ACT nasal spray   Yes No   Sig: Spray 1-2 sprays in nostril every 24 hours   gabapentin (NEURONTIN) " 300 MG capsule   No No   Sig: Take 1 capsule (300 mg) by mouth 3 times daily   hydrOXYzine HCl (ATARAX) 10 MG tablet   No No   Sig: Take 1 tablet (10 mg) by mouth 3 times daily as needed for itching   hydrOXYzine HCl (ATARAX) 25 MG tablet   No No   Sig: Take 1 tablet (25 mg) by mouth 3 times daily as needed for itching   isosorbide mononitrate (IMDUR) 30 MG 24 hr tablet   No No   Sig: TAKE 1 TABLET DAILY   lisinopril (ZESTRIL) 10 MG tablet   No No   Sig: Take 1 tablet (10 mg) by mouth every 24 hours   loratadine (CLARITIN) 10 MG tablet   Yes No   Sig: Take 10 mg by mouth daily as needed for allergies   medical cannabis (Patient's own supply)   Yes No   Simg at night   melatonin 5 MG CAPS   Yes No   Sig: Take 5 mg by mouth at bedtime   metFORMIN (GLUCOPHAGE XR) 500 MG 24 hr tablet   No No   Sig: Take 1 tablet (500 mg) by mouth 2 times daily (with meals)   methylPREDNISolone (MEDROL DOSEPAK) 4 MG tablet therapy pack   No No   Sig: Follow Package Directions   multivitamin w/minerals (THERA-VIT-M) tablet   Yes No   Sig: Take 1 tablet by mouth daily   nortriptyline (PAMELOR) 10 MG capsule   Yes No   Sig: Take 20 mg by mouth at bedtime   tamsulosin (FLOMAX) 0.4 MG capsule   Yes No   Sig: Take 0.4 mg by mouth daily   torsemide (DEMADEX) 20 MG tablet   No No   Sig: TAKE 1 TABLET DAILY      Facility-Administered Medications: None      Review of Systems     A 12 point comprehensive review of systems was negative except as noted above in HPI.    PHYSICAL EXAMINATION     Vitals      Pulse:  [] 87  Resp:  [20] 20  BP: (142-170)/(68-86) 142/68  SpO2:  [97 %-100 %] 98 %    Examination   Physical Exam:    Gen: no acute distress, comfortable, alert, pleasant, clear mentally  ENT: Did not notice much scleral icterus but saw him in a relatively dark room  Pulm: lungs are clear anteriorly with some soft basilar crackles, breathing comfortably at rest on room air  CV: regular rate and rhythm, no significant lower extremity  pitting edema  GI: abdomen is soft, nondistended, he does have tenderness to palpation in the right upper quadrant  MSK: no obvious deformities of the extremities  Derm: Diffuse papular rash with scabs, evidence of bleeding, mild jaundice  Psych: appropriate affect      Pertinent Radiology     Radiology Results:   Recent Results (from the past 24 hour(s))   US Abdomen Limited    Narrative    EXAM: US ABDOMEN LIMITED  LOCATION: [facility]  DATE: 4/26/2024    INDICATION: elevated lfts  COMPARISON: None.  TECHNIQUE: Limited abdominal ultrasound.    FINDINGS:    GALLBLADDER: Normal. No gallstones, wall thickening, or pericholecystic fluid. Negative sonographic Garcia's sign.    BILE DUCTS: There is intra and extrahepatic biliary dilatation. No cause of obstruction is seen. The common duct measures 11 mm.    LIVER: Fatty infiltration. No focal mass.    RIGHT KIDNEY: No hydronephrosis.    PANCREAS: The visualized portions are normal.    No ascites.      Impression    IMPRESSION:  1.  Biliary dilatation without cause of obstruction seen.  2.  Fatty infiltration of the liver.         Rickey Hinton,   [facility]  [facility]   Phone: #[phone]

## 2024-04-26 NOTE — PROGRESS NOTES
[facility] MEDICINE  PROGRESS NOTE       Securely message me with Fiordaliza (more info)    Code Status: Full Code       Identification/Summary:   Lance Ibrahim is a 79 year old male with PMH of CAD, CHF, stroke, bph, htn, obesity, hyperlipidemia, diabetes who presented with complaints of rash, jaundice.  His rash and liver abnormality likely related to Keflex allergy.  Symptomatic treatment are started here.  GI was consulted and recommended MRCP.  He was also found to have metabolic acidosis with lactic elevation and mild leukocytosis on presentation.  There is low threshold for antibiotics while waiting for blood culture.    Barriers to Discharge: Significant symptom    Disposition:     Assessment and Plan:  Rash  Hyperbilirubinemia with direct dominance  4/3 started Keflex  4/7 rash started  4/17 PCP started prednisone package  4/20 went to ED and got Benadryl  4/26 presented to ED with persistent rash and juandice  There is also elevated lipase.  There etiology of urinary tract obstruction cannot be ruled out.  Acute hepatitis panel pending.  Acetaminophen level less than detectable volume.  Denies bug bites no history of travel.  - MRCP per GI  - will hold off on prednisone  - sarna lotion for itching, diphenhydramine prn  - hold statin    Lactic acidosis  DEAN versus CKD  Creatinine is 2.0 higher than the previous one but comparable to historic data  pH 7.28, lactic acid 3 > 1.8 with IVF. No anion gap though. Other causes include RTA, DKA with semaglutide (negative beta hydroxybutyric acid)  -Continue IV fluid and monitor  - Monitor BMP tomorrow  - hold torsemide for now    Leukocytosis  Certainly there is risk of infection given scratching and a few open spots on his rash.  Could also be from prednisone use or hepatitis or rash itself.  No fever.  -Follow up on blood culture  -Low threshold to start antibiotics    Dyspnea  Reported dyspnea on exertion for the past few days that  he attributes to lack of sleep  Close monitoring while on IV fluid    DM neuropathy  Chronic pain  Stimulator placed early this month  He stopped his Keflex when he had the rash.  Will have him contact his clinic for more guidance on antibiotics    DM2  Sliding scale insulin prn, added meal dose 3u  Typically on metformin hold, semaglutide hold    Anticoagulation   heparin    Incidental pulmonary nodule/infiltration  Will need outpt surveillance    Hepatic steatosis  Not sure if it is related to the current hepatitis, will need follow-up    Therapy: none  Barnes:Not present  Lines: None       Current Diet  Orders Placed This Encounter      Combination Diet Clear Liquid        Clinically Significant Risk Factors Present on Admission                # Drug Induced Platelet Defect: home medication list includes an antiplatelet medication   # Hypertension: Noted on problem list     # DMII: A1C = 8.0 % (Ref range: 0.0 - 5.6 %) within past 6 months    # Overweight: Estimated body mass index is 28.58 kg/m  as calculated from the following:    Height as of this encounter: 1.829 m (6').    Weight as of this encounter: 95.6 kg (210 lb 12.2 oz).              Interval History/Subjective:  He is uncomfortable with the itching.  He has mild dyspnea on exertion that he attributes to not getting good sleep.  He denies fevers or chills.      Last 24H PRN:     diphenhydrAMINE (BENADRYL) capsule 25 mg **OR** diphenhydrAMINE (BENADRYL) injection 25 mg, 25 mg at 04/26/24 0844    hydrocortisone 2.5 % cream, 1 applicator at 04/26/24 0537    senna-docusate (SENOKOT-S/PERICOLACE) 8.6-50 MG per tablet 1 tablet, 1 tablet at 04/26/24 1452 **OR** senna-docusate (SENOKOT-S/PERICOLACE) 8.6-50 MG per tablet 2 tablet    Physical Exam/Objective:  Temp:  [97.6  F (36.4  C)-97.7  F (36.5  C)] 97.6  F (36.4  C)  Pulse:  [] 76  Resp:  [18-20] 18  BP: (138-170)/(65-90) 138/76  SpO2:  [95 %-100 %] 100 %  Wt Readings from Last 4 Encounters:   04/26/24  95.6 kg (210 lb 12.2 oz)   04/17/24 100.7 kg (222 lb)   03/27/24 104.8 kg (231 lb)   02/27/24 108.4 kg (239 lb)     Body mass index is 28.58 kg/m .    General Appearance: Alert and wake, not in distress  Respiratory: clear lungs, no crackles or wheezing  Cardiovascular: rhythmic, normal S1 and S2, no murmur  GI: soft, non-tender, normal bowel sound, jaundiced  Neurology: oriented x 3  Psych: cooperative and calm, normal affect  Skin: Vesicular papular rash diffusely on his torso.  No lesion on the scalp.  Manage vesicles have ruptured and dried up.  2 spots of scratched lesion look like healed small ulcers.  No rash or wound is actually putting out discharge.  No erythema of the skin outside of the rash itself.    Medications:   Personally Reviewed.  Medications   Current Facility-Administered Medications   Medication Dose Route Frequency Provider Last Rate Last Admin    lactated ringers infusion   Intravenous Continuous Rickey Hinton DO 75 mL/hr at 04/26/24 1417 New Bag at 04/26/24 1417     Current Facility-Administered Medications   Medication Dose Route Frequency Provider Last Rate Last Admin    amLODIPine (NORVASC) tablet 5 mg  5 mg Oral Daily Candie Goss MD   5 mg at 04/26/24 1452    aspirin EC tablet 81 mg  81 mg Oral Daily Candie Goss MD   81 mg at 04/26/24 1452    [Held by provider] atorvastatin (LIPITOR) tablet 40 mg  40 mg Oral Daily Candie Goss MD        camphor-menthol (DERMASARRA) lotion   Topical Q6H Candie Goss MD   Given at 04/26/24 1627    insulin aspart (NovoLOG) injection (RAPID ACTING)  1-7 Units Subcutaneous TID AC Candie Goss MD   2 Units at 04/26/24 1643    insulin aspart (NovoLOG) injection (RAPID ACTING)  1-5 Units Subcutaneous At Bedtime Candie Goss MD        insulin aspart (NovoLOG) injection (RAPID ACTING)  3 Units Subcutaneous TID w/meals Candie Goss MD   3 Units at 04/26/24 1643    isosorbide mononitrate (IMDUR) 24 hr tablet 30 mg  30 mg Oral Daily Candie Goss MD   30  mg at 04/26/24 1452    [Held by provider] lisinopril (ZESTRIL) tablet 10 mg  10 mg Oral Q24H Candie Goss MD        loratadine (CLARITIN) tablet 10 mg  10 mg Oral Daily Candie Goss MD   10 mg at 04/26/24 1452    [Held by provider] metFORMIN (GLUCOPHAGE XR) 24 hr tablet 500 mg  500 mg Oral BID w/meals Candie Goss MD        sodium chloride (PF) 0.9% PF flush 3 mL  3 mL Intracatheter Q8H Rickey Hinton DO        [Held by provider] torsemide (DEMADEX) tablet 20 mg  20 mg Oral Daily Candie Goss MD           Data reviewed today: I personally reviewed all new medications, labs, imaging/diagnostics reports over the past 24 hours. Pertinent findings include:    Imaging:   Recent Results (from the past 24 hour(s))   US Abdomen Limited    Narrative    EXAM: US ABDOMEN LIMITED  LOCATION: [facility]  DATE: 4/26/2024    INDICATION: elevated lfts  COMPARISON: None.  TECHNIQUE: Limited abdominal ultrasound.    FINDINGS:    GALLBLADDER: Normal. No gallstones, wall thickening, or pericholecystic fluid. Negative sonographic Garcia's sign.    BILE DUCTS: There is intra and extrahepatic biliary dilatation. No cause of obstruction is seen. The common duct measures 11 mm.    LIVER: Fatty infiltration. No focal mass.    RIGHT KIDNEY: No hydronephrosis.    PANCREAS: The visualized portions are normal.    No ascites.      Impression    IMPRESSION:  1.  Biliary dilatation without cause of obstruction seen.  2.  Fatty infiltration of the liver.       CT Abdomen Pelvis w/o Contrast    Narrative    EXAM: CT ABDOMEN PELVIS W/O CONTRAST  LOCATION: [facility]  DATE: 4/26/2024    INDICATION: Elevated LFTs, lipase.  No significant acute findings found on ultrasound.  COMPARISON: 12/03/2021.  TECHNIQUE: CT scan of the abdomen and pelvis was performed without IV contrast. Multiplanar reformats were obtained. Dose reduction techniques were used.  CONTRAST: None.    FINDINGS:   LOWER CHEST:  There is a 2.1 cm nodule or nodular infiltrate in the left lower lobe on the first image of this exam and not imaged in its entirety.    HEPATOBILIARY: The gallbladder is distended but otherwise appears normal. No calcified gallstone.    PANCREAS: Normal.    SPLEEN: Normal.    ADRENAL GLANDS: Stable small left adrenal gland nodule.    KIDNEYS/BLADDER: No urinary stone or hydronephrosis. Cyst at the upper pole of the left kidney.    BOWEL: Colonic diverticula without acute diverticulitis. No bowel obstruction or inflammation. Moderate amount of stool in the colon. No free intraperitoneal gas or fluid.    LYMPH NODES: Normal.    VASCULATURE: Atherosclerotic calcification of the aorta and its branches. No aneurysm.    PELVIC ORGANS: Probable radiation seeds in the prostate gland.    MUSCULOSKELETAL: Anterior abdominal wall mesh. Small right inguinal hernia containing fat. Spinal stimulator implanted in the subcutaneous fat of the right buttock. Degenerative disease throughout spine.      Impression    IMPRESSION:   1.  Left lower lobe nodule or nodular infiltrate, incompletely imaged on this exam. Follow-up recommended.  2.  No other acute abnormality.  3.  Colonic diverticula without acute diverticulitis. No bowel obstruction or inflammation.     MR Abdomen MRCP w/o & w Contrast    Narrative    EXAM: MR ABDOMEN MRCP W/O and W CONTRAST  LOCATION: [facility]  DATE: 4/26/2024    INDICATION: Elevated LFTs.  COMPARISON: 04/26/2024 CT and ultrasound  TECHNIQUE: Routine MR liver/pancreas protocol including axial and coronal MRCP sequences. 2D and 3D reconstruction performed by MR technologist including MIP reconstruction and slab cholangiograms. If performed with contrast, additional dynamic T1 post   IV contrast images.  CONTRAST: 9mL Gadavist     FINDINGS:     MRCP: The gallbladder is distended. There is intrahepatic bile duct dilatation. The common bile duct is dilated to the mid duct, where  there is a 15 mm length of nonvisualization in the region of the pancreatic head. The distal common bile duct is small   in caliber. The pancreatic duct is normal in caliber. Multiple small cystic lesions measuring up to 9 mm are along the course of the pancreatic duct.    ADDITIONAL FINDINGS: No definite mass identified in the region of the pancreas. No lymphadenopathy. A few small simple left renal cysts do not require follow-up. There is atherosclerotic disease.        Impression    IMPRESSION:  1.  Bile ducts dilatation and gallbladder distention due to narrowing or occlusion of the common bile duct. The source of narrowing is not identified on this study, though a pancreatic mass is possible. ERCP should be considered if not already performed.  2.  Multiple small cystic lesions along the course of the pancreatic duct. 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 Recommend CT or MRI/MRCP in 6 months.    REFERENCE:  Revisions of international consensus [place] guidelines for the management of IPMN of the pancreas. Pancreatology 2017;17(5):738-753.    Largest cyst less than 10 mm: CT or MRI/MRCP in 6 months and then every 2 years if no change.         Labs:  MR Abdomen MRCP w/o & w Contrast   Final Result   IMPRESSION:   1.  Bile ducts dilatation and gallbladder distention due to narrowing or occlusion of the common bile duct. The source of narrowing is not identified on this study, though a pancreatic mass is possible. ERCP should be considered if not already performed.   2.  Multiple small cystic lesions along the course of the pancreatic duct. 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 Recommend CT or MRI/MRCP in 6 months.      REFERENCE:   Revisions of international consensus [place] guidelines for the management of IPMN of the pancreas. Pancreatology 2017;17(5):738-753.      Largest cyst less than 10 mm: CT or MRI/MRCP in 6 months and then every 2  years if no change.         CT Abdomen Pelvis w/o Contrast   Final Result   IMPRESSION:    1.  Left lower lobe nodule or nodular infiltrate, incompletely imaged on this exam. Follow-up recommended.   2.  No other acute abnormality.   3.  Colonic diverticula without acute diverticulitis. No bowel obstruction or inflammation.         US Abdomen Limited   Final Result   IMPRESSION:   1.  Biliary dilatation without cause of obstruction seen.   2.  Fatty infiltration of the liver.              Recent Results (from the past 24 hour(s))   Comprehensive metabolic panel    Collection Time: 04/25/24 11:29 PM   Result Value Ref Range    Sodium 132 (L) 135 - 145 mmol/L    Potassium 4.4 3.4 - 5.3 mmol/L    Carbon Dioxide (CO2) 15 (L) 22 - 29 mmol/L    Anion Gap 13 7 - 15 mmol/L    Urea Nitrogen 56.3 (H) 8.0 - 23.0 mg/dL    Creatinine 2.17 (H) 0.67 - 1.17 mg/dL    GFR Estimate 30 (L) >60 mL/min/1.73m2    Calcium 9.4 8.8 - 10.2 mg/dL    Chloride 104 98 - 107 mmol/L    Glucose 482 (H) 70 - 99 mg/dL    Alkaline Phosphatase 619 (H) 40 - 150 U/L     (H) 0 - 45 U/L     (H) 0 - 70 U/L    Protein Total 7.7 6.4 - 8.3 g/dL    Albumin 3.7 3.5 - 5.2 g/dL    Bilirubin Total 4.9 (H) <=1.2 mg/dL   Lipase    Collection Time: 04/25/24 11:29 PM   Result Value Ref Range    Lipase 495 (H) 13 - 60 U/L   CBC with platelets and differential    Collection Time: 04/25/24 11:29 PM   Result Value Ref Range    WBC Count 10.6 4.0 - 11.0 10e3/uL    RBC Count 4.42 4.40 - 5.90 10e6/uL    Hemoglobin 12.7 (L) 13.3 - 17.7 g/dL    Hematocrit 38.2 (L) 40.0 - 53.0 %    MCV 86 78 - 100 fL    MCH 28.7 26.5 - 33.0 pg    MCHC 33.2 31.5 - 36.5 g/dL    RDW 18.5 (H) 10.0 - 15.0 %    Platelet Count 252 150 - 450 10e3/uL    % Neutrophils 67 %    % Lymphocytes 20 %    % Monocytes 8 %    % Eosinophils 2 %    % Basophils 1 %    % Immature Granulocytes 2 %    NRBCs per 100 WBC 0 <1 /100    Absolute Neutrophils 7.1 1.6 - 8.3 10e3/uL    Absolute Lymphocytes 2.1 0.8 -  5.3 10e3/uL    Absolute Monocytes 0.8 0.0 - 1.3 10e3/uL    Absolute Eosinophils 0.2 0.0 - 0.7 10e3/uL    Absolute Basophils 0.1 0.0 - 0.2 10e3/uL    Absolute Immature Granulocytes 0.3 <=0.4 10e3/uL    Absolute NRBCs 0.0 10e3/uL   CK total    Collection Time: 04/25/24 11:29 PM   Result Value Ref Range     39 - 308 U/L   CRP inflammation    Collection Time: 04/25/24 11:29 PM   Result Value Ref Range    CRP Inflammation 6.00 (H) <5.00 mg/L   N terminal pro BNP outpatient    Collection Time: 04/25/24 11:29 PM   Result Value Ref Range    N Terminal Pro BNP Outpatient 1,078 0 - 1,800 pg/mL   Comprehensive metabolic panel    Collection Time: 04/26/24  4:30 AM   Result Value Ref Range    Sodium 138 135 - 145 mmol/L    Potassium 3.8 3.4 - 5.3 mmol/L    Carbon Dioxide (CO2) 14 (L) 22 - 29 mmol/L    Anion Gap 13 7 - 15 mmol/L    Urea Nitrogen 51.7 (H) 8.0 - 23.0 mg/dL    Creatinine 2.05 (H) 0.67 - 1.17 mg/dL    GFR Estimate 32 (L) >60 mL/min/1.73m2    Calcium 9.0 8.8 - 10.2 mg/dL    Chloride 111 (H) 98 - 107 mmol/L    Glucose 197 (H) 70 - 99 mg/dL    Alkaline Phosphatase 589 (H) 40 - 150 U/L     (H) 0 - 45 U/L     (H) 0 - 70 U/L    Protein Total 7.3 6.4 - 8.3 g/dL    Albumin 3.5 3.5 - 5.2 g/dL    Bilirubin Total 5.0 (H) <=1.2 mg/dL   Lipase    Collection Time: 04/26/24  4:30 AM   Result Value Ref Range    Lipase 444 (H) 13 - 60 U/L   INR    Collection Time: 04/26/24  4:30 AM   Result Value Ref Range    INR 1.11 0.85 - 1.15   Acetaminophen level    Collection Time: 04/26/24  4:30 AM   Result Value Ref Range    Acetaminophen <5.0 (L) 10.0 - 30.0 ug/mL   CBC with platelets and differential    Collection Time: 04/26/24  4:30 AM   Result Value Ref Range    WBC Count 11.4 (H) 4.0 - 11.0 10e3/uL    RBC Count 4.10 (L) 4.40 - 5.90 10e6/uL    Hemoglobin 11.9 (L) 13.3 - 17.7 g/dL    Hematocrit 35.5 (L) 40.0 - 53.0 %    MCV 87 78 - 100 fL    MCH 29.0 26.5 - 33.0 pg    MCHC 33.5 31.5 - 36.5 g/dL    RDW 18.1 (H) 10.0 -  15.0 %    Platelet Count 253 150 - 450 10e3/uL    % Neutrophils 63 %    % Lymphocytes 25 %    % Monocytes 8 %    % Eosinophils 3 %    % Basophils 1 %    % Immature Granulocytes 2 %    NRBCs per 100 WBC 0 <1 /100    Absolute Neutrophils 7.1 1.6 - 8.3 10e3/uL    Absolute Lymphocytes 2.8 0.8 - 5.3 10e3/uL    Absolute Monocytes 0.9 0.0 - 1.3 10e3/uL    Absolute Eosinophils 0.3 0.0 - 0.7 10e3/uL    Absolute Basophils 0.1 0.0 - 0.2 10e3/uL    Absolute Immature Granulocytes 0.2 <=0.4 10e3/uL    Absolute NRBCs 0.0 10e3/uL   Bilirubin direct    Collection Time: 04/26/24  4:30 AM   Result Value Ref Range    Bilirubin Direct 4.14 (H) 0.00 - 0.30 mg/dL   N terminal pro BNP outpatient    Collection Time: 04/26/24  4:30 AM   Result Value Ref Range    N Terminal Pro BNP Outpatient 959 0 - 1,800 pg/mL   Glucose by meter    Collection Time: 04/26/24  4:39 AM   Result Value Ref Range    GLUCOSE BY METER POCT 172 (H) 70 - 99 mg/dL   Glucose by meter    Collection Time: 04/26/24  7:32 AM   Result Value Ref Range    GLUCOSE BY METER POCT 167 (H) 70 - 99 mg/dL   Lactic acid whole blood    Collection Time: 04/26/24  8:52 AM   Result Value Ref Range    Lactic Acid 3.0 (H) 0.7 - 2.0 mmol/L   Basic metabolic panel    Collection Time: 04/26/24  8:52 AM   Result Value Ref Range    Sodium 136 135 - 145 mmol/L    Potassium 3.8 3.4 - 5.3 mmol/L    Chloride 109 (H) 98 - 107 mmol/L    Carbon Dioxide (CO2) 16 (L) 22 - 29 mmol/L    Anion Gap 11 7 - 15 mmol/L    Urea Nitrogen 50.8 (H) 8.0 - 23.0 mg/dL    Creatinine 1.93 (H) 0.67 - 1.17 mg/dL    GFR Estimate 35 (L) >60 mL/min/1.73m2    Calcium 8.6 (L) 8.8 - 10.2 mg/dL    Glucose 317 (H) 70 - 99 mg/dL   Blood gas venous    Collection Time: 04/26/24  8:52 AM   Result Value Ref Range    pH Venous 7.28 (L) 7.32 - 7.43    pCO2 Venous 34 (L) 40 - 50 mm Hg    pO2 Venous 37 25 - 47 mm Hg    Bicarbonate Venous 16 (L) 21 - 28 mmol/L    Base Excess/Deficit Venous -11.1 (L) -3.0 - 3.0 mmol/L    FIO2       Oxyhemoglobin Venous 66 (L) 70 - 75 %    O2 Sat, Venous 67.4 (L) 70.0 - 75.0 %   Procalcitonin    Collection Time: 04/26/24  8:52 AM   Result Value Ref Range    Procalcitonin 0.65 (H) <0.50 ng/mL   Ketone Beta-Hydroxybutyrate Quantitative    Collection Time: 04/26/24  8:52 AM   Result Value Ref Range    Ketone (Beta-Hydroxybutyrate) Quantitative <0.18 <=0.30 mmol/L   Glucose by meter    Collection Time: 04/26/24 10:57 AM   Result Value Ref Range    GLUCOSE BY METER POCT 240 (H) 70 - 99 mg/dL   Lactic acid whole blood    Collection Time: 04/26/24  2:33 PM   Result Value Ref Range    Lactic Acid 1.8 0.7 - 2.0 mmol/L   Glucose by meter    Collection Time: 04/26/24  4:38 PM   Result Value Ref Range    GLUCOSE BY METER POCT 205 (H) 70 - 99 mg/dL       Pending Labs:  Unresulted Labs Ordered in the Past 30 Days of this Admission       Date and Time Order Name Status Description    4/26/2024  8:42 AM Blood Culture Peripheral Blood In process     4/26/2024  8:42 AM Blood Culture Peripheral Blood In process     4/26/2024  3:11 AM Acute hepatitis panel In process               DERICK MARTINS MD  [facility] Medicine  [facility]  Phone: #[phone]    Securely message me with Fiordaliza (more info)

## 2024-04-26 NOTE — CONSULTS
GI CONSULT NOTE      Name: Lance Ibrahim  Medical Record #: [id number]  YOB: 1945  Date of Admission: 2024  Date/Time: 2024/8:47 AM     CHIEF COMPLAINT: elevated LFTs     HISTORY OF PRESENT ILLNESS: This is a 79 year old year old White patient seen at the request of Dr. Hinton with a history of CAD, CHF, prior TIA, BPH, HTN, HLD, DM, obesity, and chronic back and foot pain S/P stimulator placement on 4/3 for which Keflex was given post operatively.  Four days after starting Keflex he developed a pruritus rash and he later stopped the antibiotic.  The rash is very minimally improved at this point.  He presents with ongoing pruritus and also jaundice which was noticed recently by his son.    The patient denies having any known history of liver disease, and his LFTs were normal 1 year ago.  He denies any intake of alcohol since his wife  in , and prior to this they would enjoy wine with dinner and social events.  There is no known family history of liver problems.      For the past 6 months or so he has lost about 35 pounds, and he has been taking Rybelsus with recent dose increase.  For the past week or so his appetite has been very low.  He denies having abdominal pain or nausea/vomiting with eating.  He does have some upper abdominal discomfort recently, only with palpation.  His urine has been very dark.  Bms have been normal without recent change, and he denies having light or abnormal colored stools.  Screening colonoscopy was done 3/2020 noting diverticulosis and polyps, and future exam was not recommended given his age.    PAST MEDICAL HISTORY:  Past Medical History:   Diagnosis Date    Anemia     Arthritis     osteoarthritis knees    BPH     CAD (coronary artery disease)     subtotal occlusion in the small distal LAD     Cardiomyopathy     Cardiomyopathy (H)     Cerebral artery occlusion with cerebral infarction     TIA , no residual    Cervicalgia     Chronic kidney  disease     Chronic low back pain     Chronic rhinitis     Chronic rhinitis     Gouty arthropathy     HTN (hypertension)     Hyperlipidemia     Hypertension     Kidney stone     Nocturia     Obesity     PVD (peripheral vascular disease)     Sleep apnea     doesn't use cpap    TIA (transient ischaemic attack)     Type 2 diabetes mellitus - 2018    Ureteral stone        FAMILY HISTORY:  Family History   Problem Relation Age of Onset    Family History Negative Mother          88 yo    Cancer Father          74 yo brain    Diabetes Maternal Grandfather          89 yo    Alcohol/Drug Paternal Grandfather             Colon Cancer No family hx of        SOCIAL HISTORY:  Social History     Socioeconomic History    Marital status:      Spouse name: Not on file    Number of children: Not on file    Years of education: Not on file    Highest education level: Not on file   Occupational History     Employer: SELF   Tobacco Use    Smoking status: Former     Current packs/day: 0.00     Types: Cigarettes     Quit date: 3/17/1993     Years since quittin.1    Smokeless tobacco: Never   Vaping Use    Vaping status: Never Used   Substance and Sexual Activity    Alcohol use: Not Currently    Drug use: No    Sexual activity: Never   Other Topics Concern    Parent/sibling w/ CABG, MI or angioplasty before 65F 55M? Not Asked   Social History Narrative    Not on file     Social Determinants of Health     Financial Resource Strain: Not on file   Food Insecurity: Not on file   Transportation Needs: Not on file   Physical Activity: Not on file   Stress: Not on file   Social Connections: Not on file   Interpersonal Safety: Low Risk  (3/27/2024)    Interpersonal Safety     Do you feel physically and emotionally safe where you currently live?: Yes     Within the past 12 months, have you been hit, slapped, kicked or otherwise physically hurt by someone?: No     Within the past 12 months, have  you been humiliated or emotionally abused in other ways by your partner or ex-partner?: No   Housing Stability: Not on file       MEDICATIONS PRIOR TO ADMISSION: (Not in a hospital admission)         ALLERGIES: Penicillins, Sulfa antibiotics, Ancef [cefazolin], and Cephalexin    REVIEW OF SYSTEMS (ROS): Complete review of systems negative other than listed in HPI.    PHYSICAL EXAM:    BP (!) 165/79   Pulse 93   Resp 20   Ht 1.829 m (6')   Wt 93.4 kg (206 lb)   SpO2 100%   BMI 27.94 kg/m      GENERAL: Pleasant, no obvious distress    SKIN: Mild jaundice, diffuse scabby rash on face, trunk, arms, upper legs    NECK: Supple without adenopathy    EYES: Mild scleral icterus    LUNGS: Clear to auscultation bilaterally    HEART: Regular rate and rhythm, S1 and S2 present, no lower extremity edema    ABDOMEN: Soft, non-distended, mildly tender RUQ, no guarding/rebound/mass, bowel sounds normal, no obvious organomegaly    MUSKULOSKELETAL:  Warm and well perfused, moves all extremities well    NEUROLOGIC: Alert and oriented    PSYCHIATRIC: Normal affect    LAB DATA:  Recent Labs   Lab Test 04/26/24 0430 04/25/24 2329 03/27/24  1338 06/16/22  0934 12/25/21  2000 03/25/19  1647 03/04/19  0947   WBC 11.4* 10.6 7.4   < > 18.6*   < >  --    HGB 11.9* 12.7* 12.9*   < > 12.7*   < >  --    MCV 87 86 93   < > 91   < >  --     252 294   < > 379   < >  --    INR 1.11  --   --   --  1.17*  --  1.13    < > = values in this interval not displayed.     Recent Labs   Lab Test 04/26/24  0732 04/26/24 0439 04/26/24  0430 04/25/24 2329 03/27/24  1338 02/27/24  1113 08/17/23  0852   NA  --   --  138 132*  --   --  141   POTASSIUM  --   --  3.8 4.4 4.9   < > 4.5   CHLORIDE  --   --  111* 104  --   --  104   CO2  --   --  14* 15*  --   --  22   BUN  --   --  51.7* 56.3*  --   --  46.6*   CR  --   --  2.05* 2.17*  --   --  1.65*   ANIONGAP  --   --  13 13  --   --  15   SHANNAN  --   --  9.0 9.4  --   --  9.7   * 172* 197* 482*   --   --  124*    < > = values in this interval not displayed.     Recent Labs   Lab Test 04/26/24  0430 04/25/24  2329 04/27/23  1248 06/16/22  0934 12/03/21  1415 07/28/20  0626 01/24/20  1359 06/25/19  0754 03/01/19  1126 05/01/18  1332 10/22/16  2052   ALBUMIN 3.5 3.7 4.3   < >  --    < > 3.6  --   --    < > 3.3*   BILITOTAL 5.0* 4.9* 0.3   < >  --    < > 0.3  --   --    < > 0.2   * 330* 26   < >  --    < > 37   < >  --    < > 28   * 250* 17   < >  --    < > 21  --   --    < > 15   ALKPHOS 589* 619* 100   < >  --    < > 89  --   --    < > 84   PROTEIN  --   --   --   --  30*  --  Negative  --  Negative   < >  --    LIPASE 444* 495*  --   --   --   --   --   --   --   --  367    < > = values in this interval not displayed.     APAP negative  Direct bili 4.14 (high)  BNP 1078 (normal)  CRP 6 (slightly high)   (normal)  Pending- acute hepatitis panel    IMAGING:  CT Abdomen Pelvis w/o Contrast  Result Date: 4/26/2024  EXAM: CT ABDOMEN PELVIS W/O CONTRAST LOCATION: [facility] DATE: 4/26/2024 INDICATION: Elevated LFTs, lipase.  No significant acute findings found on ultrasound. COMPARISON: 12/03/2021. TECHNIQUE: CT scan of the abdomen and pelvis was performed without IV contrast. Multiplanar reformats were obtained. Dose reduction techniques were used. CONTRAST: None. FINDINGS: LOWER CHEST: There is a 2.1 cm nodule or nodular infiltrate in the left lower lobe on the first image of this exam and not imaged in its entirety. HEPATOBILIARY: The gallbladder is distended but otherwise appears normal. No calcified gallstone. PANCREAS: Normal. SPLEEN: Normal. ADRENAL GLANDS: Stable small left adrenal gland nodule. KIDNEYS/BLADDER: No urinary stone or hydronephrosis. Cyst at the upper pole of the left kidney. BOWEL: Colonic diverticula without acute diverticulitis. No bowel obstruction or inflammation. Moderate amount of stool in the colon. No free intraperitoneal gas or fluid.  LYMPH NODES: Normal. VASCULATURE: Atherosclerotic calcification of the aorta and its branches. No aneurysm. PELVIC ORGANS: Probable radiation seeds in the prostate gland. MUSCULOSKELETAL: Anterior abdominal wall mesh. Small right inguinal hernia containing fat. Spinal stimulator implanted in the subcutaneous fat of the right buttock. Degenerative disease throughout spine.     IMPRESSION: 1.  Left lower lobe nodule or nodular infiltrate, incompletely imaged on this exam. Follow-up recommended. 2.  No other acute abnormality. 3.  Colonic diverticula without acute diverticulitis. No bowel obstruction or inflammation.     US Abdomen Limited  Result Date: 4/26/2024  EXAM: US ABDOMEN LIMITED LOCATION: [facility] DATE: 4/26/2024 INDICATION: elevated lfts COMPARISON: None. TECHNIQUE: Limited abdominal ultrasound. FINDINGS: GALLBLADDER: Normal. No gallstones, wall thickening, or pericholecystic fluid. Negative sonographic Garcia's sign. BILE DUCTS: There is intra and extrahepatic biliary dilatation. No cause of obstruction is seen. The common duct measures 11 mm. LIVER: Fatty infiltration. No focal mass. RIGHT KIDNEY: No hydronephrosis. PANCREAS: The visualized portions are normal. No ascites.   IMPRESSION: 1.  Biliary dilatation without cause of obstruction seen. 2.  Fatty infiltration of the liver.     ASSESSMENT:  He is an 79 year old male with a history of DM, CAD, HTN, HLD, chronic pain, and recent rash attributed to taking Keflex.  He is admitted with recent jaundice and pruritus, and he is found to have elevated LFTs and some RUQ abdominal pain.  Imaging thus far shows biliary dilation of unclear cause as well as hepatic steatosis.  It's unclear if he could have biliary obstruction such as stone or mass, and an MRCP will be ordered to further assess.  If imaging is negative, additional liver serologies may be needed.  Drug induced liver injury is also possible given the recent use of  Keflex.    Active Problems:    Hyperglycemia    Elevated LFTs    Elevated lipase    Diffuse papular rash    PLAN:  Discussed with Dr. Rizwan Alejandra.  45 minutes of total time was spent providing patient care, including patient evaluation, reviewing documentation/test results, and .     MRCP ordered to look further for structural problems.  Trend labs.  Follow up on pending hepatitis panel.  GI following.                                                 Niru Bah PA-C  Thank you for the opportunity to participate in the care of this patient.   Please feel free to call me with any questions or concerns.  Phone number [phone].                ADDENDUM:  MRCP results returned:  IMPRESSION:  1.  Bile ducts dilatation and gallbladder distention due to narrowing or occlusion of the common bile duct. The source of narrowing is not identified on this study, though a pancreatic mass is possible. ERCP should be considered if not already performed.  2.  Multiple small cystic lesions along the course of the pancreatic duct. 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 Recommend CT or MRI/MRCP in 6 months.    Ok to advance diet as tolerated and possibly discharge over the weekend if doing well.  We can arrange outpatient EUS and ERCP, hopefully for next week.    Niru Bah PA-C  [facility]  [phone]    The patient was seen and evaluated in conjunction with Niru Bah. Please see her note for details.     Lance is a very pleasant 79-year-old male who presented with new onset of rash and pruritus.  He was found to be jaundiced with elevated bilirubin.  Ultrasound notable for intra and extrahepatic biliary ductal dilatation.  MRCP again showed intra and extrahepatic biliary ductal dilatation with nonvisualization of the distal CBD within the pancreas.  There is also note of multiple cystic lesions along the course of the pancreatic duct.  His findings could be due  to choledocholithiasis, malignant stricture, pancreatic mass.  No sign of cholangitis at this time.  Patient updated on findings.  Will need EUS/ERCP.  This is not available over the weekend.  The earliest it could be arranged would be Tuesday at [facility].  If the patient is stable and feeling well over the weekend, he could discharge and this would be arranged as an outpatient.  Otherwise, if he stayed in the hospital until Tuesday it could be arranged for that day.  We will follow for now.    Rizwan Alejandra DO  4/26/20243:39 PM  [facility]    25 minutes of total time was spent providing patient care, including patient evaluation, reviewing documentation/test results and .

## 2024-04-26 NOTE — UTILIZATION REVIEW
Admission Status; Secondary Review Determination   Under the authority of the Utilization Management Committee, the utilization review process indicated a secondary review on Lance Ibrahmi. The review outcome is based on review of the medical records, discussions with staff, and applying clinical experience noted on the date of the review.   (x) Inpatient Status Appropriate - This patient's medical care is consistent with medical management for inpatient care and reasonable inpatient medical practice.     RATIONALE FOR DETERMINATION   Lance Ibrahim is an 79 yr old male who presented with rash and jaundice with underlying CAD, CHF, CVA, HTN, HLD, DM and recent abx use after neurostimulator placement.  Unclear if drug induced liver injury.  Also with DEAN on CKD with Cr continuing above baseline despite >2L NS.  GI consulting and planning MRCP to further identify etiology for elevated LFTs.  Has had lactic acid of 3 and acidosis noted on blood gas.  Currently ongoing evaluaiton, IVF and close monitoring in progress. Derm consultation also pending.    At the time of admission with the information available to the attending physician more than 2 nights Hospital complex care was anticipated, based on patient risk of adverse outcome if treated as outpatient and complex care required. Inpatient admission is appropriate based on the Medicare guidelines.   The information on this document is developed by the utilization review team in order for the business office to ensure compliance. This only denotes the appropriateness of proper admission status and does not reflect the quality of care rendered.   The definitions of Inpatient Status and Observation Status used in making the determination above are those provided in the CMS Coverage Manual, Chapter 1 and Chapter 6, section 70.4.   Sincerely,   Laurel Grande MD  Utilization Review  Physician Advisor  [facility]

## 2024-04-26 NOTE — ED NOTES
Spoke with son. Provided update' plan for derm/GI consult and MRCP, waiting on inpt room. Son will be in later to visit patient.

## 2024-04-27 LAB
ALBUMIN SERPL BCG-MCNC: 3.3 G/DL (ref 3.5–5.2)
ALP SERPL-CCNC: 605 U/L (ref 40–150)
ALT SERPL W P-5'-P-CCNC: 301 U/L (ref 0–70)
ANION GAP SERPL CALCULATED.3IONS-SCNC: 11 MMOL/L (ref 7–15)
AST SERPL W P-5'-P-CCNC: 268 U/L (ref 0–45)
BASE EXCESS BLDV CALC-SCNC: -5.4 MMOL/L (ref -3–3)
BASOPHILS # BLD AUTO: 0.1 10E3/UL (ref 0–0.2)
BASOPHILS NFR BLD AUTO: 1 %
BILIRUB SERPL-MCNC: 7.1 MG/DL
BUN SERPL-MCNC: 35.2 MG/DL (ref 8–23)
CALCIUM SERPL-MCNC: 9.1 MG/DL (ref 8.8–10.2)
CHLORIDE SERPL-SCNC: 109 MMOL/L (ref 98–107)
CREAT SERPL-MCNC: 1.72 MG/DL (ref 0.67–1.17)
DEPRECATED HCO3 PLAS-SCNC: 18 MMOL/L (ref 22–29)
EGFRCR SERPLBLD CKD-EPI 2021: 40 ML/MIN/1.73M2
EOSINOPHIL # BLD AUTO: 0.3 10E3/UL (ref 0–0.7)
EOSINOPHIL NFR BLD AUTO: 4 %
ERYTHROCYTE [DISTWIDTH] IN BLOOD BY AUTOMATED COUNT: 18.1 % (ref 10–15)
GLUCOSE BLDC GLUCOMTR-MCNC: 161 MG/DL (ref 70–99)
GLUCOSE BLDC GLUCOMTR-MCNC: 200 MG/DL (ref 70–99)
GLUCOSE BLDC GLUCOMTR-MCNC: 208 MG/DL (ref 70–99)
GLUCOSE BLDC GLUCOMTR-MCNC: 228 MG/DL (ref 70–99)
GLUCOSE BLDC GLUCOMTR-MCNC: 274 MG/DL (ref 70–99)
GLUCOSE SERPL-MCNC: 184 MG/DL (ref 70–99)
HCO3 BLDV-SCNC: 20 MMOL/L (ref 21–28)
HCT VFR BLD AUTO: 30.7 % (ref 40–53)
HGB BLD-MCNC: 10.3 G/DL (ref 13.3–17.7)
IMM GRANULOCYTES # BLD: 0.3 10E3/UL
IMM GRANULOCYTES NFR BLD: 4 %
LIPASE SERPL-CCNC: 163 U/L (ref 13–60)
LYMPHOCYTES # BLD AUTO: 1.7 10E3/UL (ref 0.8–5.3)
LYMPHOCYTES NFR BLD AUTO: 22 %
MCH RBC QN AUTO: 28.9 PG (ref 26.5–33)
MCHC RBC AUTO-ENTMCNC: 33.6 G/DL (ref 31.5–36.5)
MCV RBC AUTO: 86 FL (ref 78–100)
MONOCYTES # BLD AUTO: 0.6 10E3/UL (ref 0–1.3)
MONOCYTES NFR BLD AUTO: 8 %
NEUTROPHILS # BLD AUTO: 4.9 10E3/UL (ref 1.6–8.3)
NEUTROPHILS NFR BLD AUTO: 62 %
NRBC # BLD AUTO: 0 10E3/UL
NRBC BLD AUTO-RTO: 0 /100
O2/TOTAL GAS SETTING VFR VENT: 20 %
OXYHGB MFR BLDV: 35 % (ref 70–75)
PCO2 BLDV: 37 MM HG (ref 40–50)
PH BLDV: 7.35 [PH] (ref 7.32–7.43)
PLATELET # BLD AUTO: 199 10E3/UL (ref 150–450)
PO2 BLDV: 21 MM HG (ref 25–47)
POTASSIUM SERPL-SCNC: 4.1 MMOL/L (ref 3.4–5.3)
PROT SERPL-MCNC: 6 G/DL (ref 6.4–8.3)
RBC # BLD AUTO: 3.57 10E6/UL (ref 4.4–5.9)
SAO2 % BLDV: 36 % (ref 70–75)
SODIUM SERPL-SCNC: 138 MMOL/L (ref 135–145)
WBC # BLD AUTO: 7.8 10E3/UL (ref 4–11)

## 2024-04-27 PROCEDURE — 250N000013 HC RX MED GY IP 250 OP 250 PS 637: Performed by: STUDENT IN AN ORGANIZED HEALTH CARE EDUCATION/TRAINING PROGRAM

## 2024-04-27 PROCEDURE — 82805 BLOOD GASES W/O2 SATURATION: CPT | Performed by: STUDENT IN AN ORGANIZED HEALTH CARE EDUCATION/TRAINING PROGRAM

## 2024-04-27 PROCEDURE — 120N000001 HC R&B MED SURG/OB

## 2024-04-27 PROCEDURE — 83690 ASSAY OF LIPASE: CPT | Performed by: PHYSICIAN ASSISTANT

## 2024-04-27 PROCEDURE — 250N000011 HC RX IP 250 OP 636: Performed by: STUDENT IN AN ORGANIZED HEALTH CARE EDUCATION/TRAINING PROGRAM

## 2024-04-27 PROCEDURE — 250N000013 HC RX MED GY IP 250 OP 250 PS 637: Performed by: HOSPITALIST

## 2024-04-27 PROCEDURE — 80053 COMPREHEN METABOLIC PANEL: CPT | Performed by: STUDENT IN AN ORGANIZED HEALTH CARE EDUCATION/TRAINING PROGRAM

## 2024-04-27 PROCEDURE — 258N000003 HC RX IP 258 OP 636: Performed by: HOSPITALIST

## 2024-04-27 PROCEDURE — 99232 SBSQ HOSP IP/OBS MODERATE 35: CPT | Performed by: STUDENT IN AN ORGANIZED HEALTH CARE EDUCATION/TRAINING PROGRAM

## 2024-04-27 PROCEDURE — 85025 COMPLETE CBC W/AUTO DIFF WBC: CPT | Performed by: STUDENT IN AN ORGANIZED HEALTH CARE EDUCATION/TRAINING PROGRAM

## 2024-04-27 PROCEDURE — 36415 COLL VENOUS BLD VENIPUNCTURE: CPT | Performed by: STUDENT IN AN ORGANIZED HEALTH CARE EDUCATION/TRAINING PROGRAM

## 2024-04-27 RX ORDER — EMOLLIENT BASE
CREAM (GRAM) TOPICAL 3 TIMES DAILY
Status: DISCONTINUED | OUTPATIENT
Start: 2024-04-27 | End: 2024-04-30 | Stop reason: HOSPADM

## 2024-04-27 RX ADMIN — ISOSORBIDE MONONITRATE 30 MG: 30 TABLET, EXTENDED RELEASE ORAL at 07:39

## 2024-04-27 RX ADMIN — HEPARIN SODIUM 5000 UNITS: 5000 INJECTION, SOLUTION INTRAVENOUS; SUBCUTANEOUS at 06:25

## 2024-04-27 RX ADMIN — LORATADINE 10 MG: 10 TABLET ORAL at 07:39

## 2024-04-27 RX ADMIN — SODIUM CHLORIDE, POTASSIUM CHLORIDE, SODIUM LACTATE AND CALCIUM CHLORIDE: 600; 310; 30; 20 INJECTION, SOLUTION INTRAVENOUS at 03:17

## 2024-04-27 RX ADMIN — Medication: at 13:32

## 2024-04-27 RX ADMIN — DIPHENHYDRAMINE HYDROCHLORIDE 25 MG: 25 CAPSULE ORAL at 23:45

## 2024-04-27 RX ADMIN — HEPARIN SODIUM 5000 UNITS: 5000 INJECTION, SOLUTION INTRAVENOUS; SUBCUTANEOUS at 13:32

## 2024-04-27 RX ADMIN — ASPIRIN 81 MG: 81 TABLET, COATED ORAL at 07:39

## 2024-04-27 RX ADMIN — HEPARIN SODIUM 5000 UNITS: 5000 INJECTION, SOLUTION INTRAVENOUS; SUBCUTANEOUS at 21:52

## 2024-04-27 RX ADMIN — AMLODIPINE BESYLATE 5 MG: 5 TABLET ORAL at 07:39

## 2024-04-27 RX ADMIN — Medication: at 09:43

## 2024-04-27 ASSESSMENT — ACTIVITIES OF DAILY LIVING (ADL)
ADLS_ACUITY_SCORE: 26
ADLS_ACUITY_SCORE: 25
ADLS_ACUITY_SCORE: 26
ADLS_ACUITY_SCORE: 26
ADLS_ACUITY_SCORE: 25
ADLS_ACUITY_SCORE: 25
ADLS_ACUITY_SCORE: 26
ADLS_ACUITY_SCORE: 25
ADLS_ACUITY_SCORE: 26
ADLS_ACUITY_SCORE: 25
ADLS_ACUITY_SCORE: 26
ADLS_ACUITY_SCORE: 25
ADLS_ACUITY_SCORE: 26
ADLS_ACUITY_SCORE: 25

## 2024-04-27 NOTE — PLAN OF CARE
Pt A&Ox4. Denies pain, SOB, chest pain, dizziness and lightheadedness. VSS on RA. LR running at 75mL/hr in L PIV. Rash and open barbara all over his body except on his feet and palms. Did not complain of otcing and did not want lotion applied tonight. Tolerating clear liquid diet. Ambulates SBA using 4 wheel walker. Bedtime . Discharge pending.     Admission documentation still needs to be completed.   Problem: Adult Inpatient Plan of Care  Goal: Optimal Comfort and Wellbeing  Outcome: Progressing     Problem: Risk for Delirium  Goal: Improved Sleep  Outcome: Progressing   Goal Outcome Evaluation:

## 2024-04-27 NOTE — PROGRESS NOTES
[facility] MEDICINE  PROGRESS NOTE       Securely message me with Fiordaliza (more info)    Code Status: Full Code       Identification/Summary:   Lance Ibrahim is a 79 year old male with PMH of CAD, CHF, stroke, bph, htn, obesity, hyperlipidemia, diabetes who presented with complaints of rash, jaundice.  His rash and liver abnormality likely related to Keflex allergy.  Symptomatic treatment are started here.  GI was consulted and recommended MRCP, which showed common bile duct dilation without finding of stenosis point..  He was also found to have metabolic acidosis with lactic elevation and mild leukocytosis on presentation.  There is low threshold for antibiotics while waiting for blood culture.     Barriers to Discharge: Significant itching and hyperbilirubinemia     Disposition: Inpatient     Assessment and Plan:  Rash  Hyperbilirubinemia with direct dominance  4/3 started Keflex  4/7 rash started  4/17 PCP started prednisone package  4/20 went to ED and got Benadryl  4/26 presented to ED with persistent rash and juandice  There is also elevated lipase.  There etiology of urinary tract obstruction cannot be ruled out.  Acute hepatitis panel pending.  Acetaminophen level less than detectable volume.  Denies bug bites no history of travel.  MRCP showed common bile duct dilatation and intraductal papillary mucinous neoplasm, required 6 months follow-up, GI has not recommended ERCP  - will hold off on prednisone  - lotion for itching, diphenhydramine prn  - hold statin  - GI advancing diet to regular on 4/27  -Was unable to get help dermatology on 4/26     Lactic acidosis -resolved  DEAN versus CKD-improving  Creatinine is 2.0 higher than the previous one but comparable to historic data  pH 7.28, lactic acid 3 > 1.8 with IVF. No anion gap though. Other causes include RTA, DKA with semaglutide (negative beta hydroxybutyric acid)  - Continue IV fluid and monitor, IV fluid discontinued  -  Monitor BMP tomorrow  - hold torsemide for now     Leukocytosis  Certainly there is risk of infection given scratching and a few open spots on his rash.  Could also be from prednisone use or hepatitis or rash itself.  No fever.  -Follow up on blood culture  -Low threshold to start antibiotics  -Downtrended now after prednisone was stopped     Dyspnea  Reported dyspnea on exertion for the past few days that he attributes to lack of sleep  Close monitoring while on IV fluid, stopped     DM neuropathy  Chronic pain  Stimulator placed early this month  He stopped his Keflex when he had the rash.  Will have him contact his clinic for more guidance on antibiotics     DM2  Sliding scale insulin prn, added meal dose 3u> 0u  Typically on metformin hold, semaglutide hold     Anticoagulation   heparin     Incidental pulmonary nodule/infiltration  Will need outpt surveillance           Anticoagulation   Orders (Includes Only Anticoagulants)  heparin ANTICOAGULANT, 5,000 Units, Subcutaneous, Q8H      Therapy: None  Barnes:Not present  Lines: None       Current Diet  Orders Placed This Encounter      Combination Diet Low Fiber; Moderate Consistent Carb (60 g CHO per Meal) Diet        Clinically Significant Risk Factors              # Hypoalbuminemia: Lowest albumin = 3.3 g/dL at 4/27/2024  5:59 AM, will monitor as appropriate     # Hypertension: Noted on problem list       # DMII: A1C = 8.0 % (Ref range: 0.0 - 5.6 %) within past 6 months, PRESENT ON ADMISSION  # Overweight: Estimated body mass index is 28.73 kg/m  as calculated from the following:    Height as of this encounter: 1.829 m (6').    Weight as of this encounter: 96.1 kg (211 lb 12.8 oz)., PRESENT ON ADMISSION  # Moderate Malnutrition: based on nutrition assessment, PRESENT ON ADMISSION          Interval History/Subjective:  He is feeling better.  He is able to sleep better after prednisone was stopped.  His itching is better with Sarna but he could not tolerate the  menthol because of the codeine as.      Last 24H PRN:     senna-docusate (SENOKOT-S/PERICOLACE) 8.6-50 MG per tablet 1 tablet, 1 tablet at 04/26/24 1452 **OR** senna-docusate (SENOKOT-S/PERICOLACE) 8.6-50 MG per tablet 2 tablet    Physical Exam/Objective:  Temp:  [97.6  F (36.4  C)-97.9  F (36.6  C)] 97.9  F (36.6  C)  Pulse:  [68-86] 86  Resp:  [18] 18  BP: (111-152)/(52-76) 129/61  SpO2:  [96 %-98 %] 96 %  Wt Readings from Last 4 Encounters:   04/27/24 96.1 kg (211 lb 12.8 oz)   04/17/24 100.7 kg (222 lb)   03/27/24 104.8 kg (231 lb)   02/27/24 108.4 kg (239 lb)     Body mass index is 28.73 kg/m .    General Appearance: Alert and wake, not in distress  Neurology: oriented x 3  Psych: cooperative and calm, normal affect  Rash: similar papular vesicular rash all over his torso and extremities    Medications:   Personally Reviewed.  Medications   Current Facility-Administered Medications   Medication Dose Route Frequency Provider Last Rate Last Admin     Current Facility-Administered Medications   Medication Dose Route Frequency Provider Last Rate Last Admin    amLODIPine (NORVASC) tablet 5 mg  5 mg Oral Daily Candie Goss MD   5 mg at 04/27/24 0739    aspirin EC tablet 81 mg  81 mg Oral Daily Candie Goss MD   81 mg at 04/27/24 0739    [Held by provider] atorvastatin (LIPITOR) tablet 40 mg  40 mg Oral Daily Candie Goss MD        emollient (VANICREAM) cream   Topical TID Candie Goss MD        heparin ANTICOAGULANT injection 5,000 Units  5,000 Units Subcutaneous Q8H Candie Goss MD   5,000 Units at 04/27/24 0625    insulin aspart (NovoLOG) injection (RAPID ACTING)  1-7 Units Subcutaneous TID AC Candie Goss MD   3 Units at 04/27/24 1110    insulin aspart (NovoLOG) injection (RAPID ACTING)  1-5 Units Subcutaneous At Bedtime Candie Goss MD        isosorbide mononitrate (IMDUR) 24 hr tablet 30 mg  30 mg Oral Daily Candie Goss MD   30 mg at 04/27/24 0739    [Held by provider] lisinopril (ZESTRIL) tablet 10 mg   10 mg Oral Q24H Candie Goss MD        loratadine (CLARITIN) tablet 10 mg  10 mg Oral Daily Candie Goss MD   10 mg at 04/27/24 0739    [Held by provider] metFORMIN (GLUCOPHAGE XR) 24 hr tablet 500 mg  500 mg Oral BID w/meals Candie Goss MD        sodium chloride (PF) 0.9% PF flush 3 mL  3 mL Intracatheter Q8H Rickey Hinton DO   3 mL at 04/27/24 1109    [Held by provider] torsemide (DEMADEX) tablet 20 mg  20 mg Oral Daily Candie Goss MD           Data reviewed today: I personally reviewed all new medications, labs, imaging/diagnostics reports over the past 24 hours. Pertinent findings include:    Imaging:   Recent Results (from the past 24 hour(s))   MR Abdomen MRCP w/o & w Contrast    Narrative    EXAM: MR ABDOMEN MRCP W/O and W CONTRAST  LOCATION: [facility]  DATE: 4/26/2024    INDICATION: Elevated LFTs.  COMPARISON: 04/26/2024 CT and ultrasound  TECHNIQUE: Routine MR liver/pancreas protocol including axial and coronal MRCP sequences. 2D and 3D reconstruction performed by MR technologist including MIP reconstruction and slab cholangiograms. If performed with contrast, additional dynamic T1 post   IV contrast images.  CONTRAST: 9mL Gadavist     FINDINGS:     MRCP: The gallbladder is distended. There is intrahepatic bile duct dilatation. The common bile duct is dilated to the mid duct, where there is a 15 mm length of nonvisualization in the region of the pancreatic head. The distal common bile duct is small   in caliber. The pancreatic duct is normal in caliber. Multiple small cystic lesions measuring up to 9 mm are along the course of the pancreatic duct.    ADDITIONAL FINDINGS: No definite mass identified in the region of the pancreas. No lymphadenopathy. A few small simple left renal cysts do not require follow-up. There is atherosclerotic disease.        Impression    IMPRESSION:  1.  Bile ducts dilatation and gallbladder distention due to narrowing or occlusion of the common  bile duct. The source of narrowing is not identified on this study, though a pancreatic mass is possible. ERCP should be considered if not already performed.  2.  Multiple small cystic lesions along the course of the pancreatic duct. 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 Recommend CT or MRI/MRCP in 6 months.    REFERENCE:  Revisions of international consensus [place] guidelines for the management of IPMN of the pancreas. Pancreatology 2017;17(5):738-753.    Largest cyst less than 10 mm: CT or MRI/MRCP in 6 months and then every 2 years if no change.         Labs:  MR Abdomen MRCP w/o & w Contrast   Final Result   IMPRESSION:   1.  Bile ducts dilatation and gallbladder distention due to narrowing or occlusion of the common bile duct. The source of narrowing is not identified on this study, though a pancreatic mass is possible. ERCP should be considered if not already performed.   2.  Multiple small cystic lesions along the course of the pancreatic duct. 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 Recommend CT or MRI/MRCP in 6 months.      REFERENCE:   Revisions of international consensus [place] guidelines for the management of IPMN of the pancreas. Pancreatology 2017;17(5):738-753.      Largest cyst less than 10 mm: CT or MRI/MRCP in 6 months and then every 2 years if no change.         CT Abdomen Pelvis w/o Contrast   Final Result   IMPRESSION:    1.  Left lower lobe nodule or nodular infiltrate, incompletely imaged on this exam. Follow-up recommended.   2.  No other acute abnormality.   3.  Colonic diverticula without acute diverticulitis. No bowel obstruction or inflammation.         US Abdomen Limited   Final Result   IMPRESSION:   1.  Biliary dilatation without cause of obstruction seen.   2.  Fatty infiltration of the liver.              Recent Results (from the past 24 hour(s))   Lactic acid whole blood    Collection Time: 04/26/24  2:33 PM    Result Value Ref Range    Lactic Acid 1.8 0.7 - 2.0 mmol/L   Glucose by meter    Collection Time: 04/26/24  4:38 PM   Result Value Ref Range    GLUCOSE BY METER POCT 205 (H) 70 - 99 mg/dL   Glucose by meter    Collection Time: 04/26/24  9:52 PM   Result Value Ref Range    GLUCOSE BY METER POCT 189 (H) 70 - 99 mg/dL   Glucose by meter    Collection Time: 04/27/24  1:57 AM   Result Value Ref Range    GLUCOSE BY METER POCT 161 (H) 70 - 99 mg/dL   Comprehensive metabolic panel    Collection Time: 04/27/24  5:59 AM   Result Value Ref Range    Sodium 138 135 - 145 mmol/L    Potassium 4.1 3.4 - 5.3 mmol/L    Carbon Dioxide (CO2) 18 (L) 22 - 29 mmol/L    Anion Gap 11 7 - 15 mmol/L    Urea Nitrogen 35.2 (H) 8.0 - 23.0 mg/dL    Creatinine 1.72 (H) 0.67 - 1.17 mg/dL    GFR Estimate 40 (L) >60 mL/min/1.73m2    Calcium 9.1 8.8 - 10.2 mg/dL    Chloride 109 (H) 98 - 107 mmol/L    Glucose 184 (H) 70 - 99 mg/dL    Alkaline Phosphatase 605 (H) 40 - 150 U/L     (H) 0 - 45 U/L     (H) 0 - 70 U/L    Protein Total 6.0 (L) 6.4 - 8.3 g/dL    Albumin 3.3 (L) 3.5 - 5.2 g/dL    Bilirubin Total 7.1 (H) <=1.2 mg/dL   Lipase    Collection Time: 04/27/24  5:59 AM   Result Value Ref Range    Lipase 163 (H) 13 - 60 U/L   Blood gas venous    Collection Time: 04/27/24  5:59 AM   Result Value Ref Range    pH Venous 7.35 7.32 - 7.43    pCO2 Venous 37 (L) 40 - 50 mm Hg    pO2 Venous 21 (L) 25 - 47 mm Hg    Bicarbonate Venous 20 (L) 21 - 28 mmol/L    Base Excess/Deficit Venous -5.4 (L) -3.0 - 3.0 mmol/L    FIO2 20     Oxyhemoglobin Venous 35 (L) 70 - 75 %    O2 Sat, Venous 36.0 (L) 70.0 - 75.0 %   CBC with platelets and differential    Collection Time: 04/27/24  5:59 AM   Result Value Ref Range    WBC Count 7.8 4.0 - 11.0 10e3/uL    RBC Count 3.57 (L) 4.40 - 5.90 10e6/uL    Hemoglobin 10.3 (L) 13.3 - 17.7 g/dL    Hematocrit 30.7 (L) 40.0 - 53.0 %    MCV 86 78 - 100 fL    MCH 28.9 26.5 - 33.0 pg    MCHC 33.6 31.5 - 36.5 g/dL    RDW 18.1  (H) 10.0 - 15.0 %    Platelet Count 199 150 - 450 10e3/uL    % Neutrophils 62 %    % Lymphocytes 22 %    % Monocytes 8 %    % Eosinophils 4 %    % Basophils 1 %    % Immature Granulocytes 4 %    NRBCs per 100 WBC 0 <1 /100    Absolute Neutrophils 4.9 1.6 - 8.3 10e3/uL    Absolute Lymphocytes 1.7 0.8 - 5.3 10e3/uL    Absolute Monocytes 0.6 0.0 - 1.3 10e3/uL    Absolute Eosinophils 0.3 0.0 - 0.7 10e3/uL    Absolute Basophils 0.1 0.0 - 0.2 10e3/uL    Absolute Immature Granulocytes 0.3 <=0.4 10e3/uL    Absolute NRBCs 0.0 10e3/uL   Glucose by meter    Collection Time: 04/27/24  6:55 AM   Result Value Ref Range    GLUCOSE BY METER POCT 200 (H) 70 - 99 mg/dL   Glucose by meter    Collection Time: 04/27/24 11:12 AM   Result Value Ref Range    GLUCOSE BY METER POCT 274 (H) 70 - 99 mg/dL       Pending Labs:  Unresulted Labs Ordered in the Past 30 Days of this Admission       Date and Time Order Name Status Description    4/26/2024  8:42 AM Blood Culture Peripheral Blood Preliminary     4/26/2024  8:42 AM Blood Culture Peripheral Blood Preliminary             DERICK MARTINS MD  [facility]  Phone: #[phone]    Securely message me with Fiordaliza (more info)

## 2024-04-27 NOTE — PLAN OF CARE
Goal Outcome Evaluation:    Pt alert and oriented x4. VSS. RA. PIV intact and infusing. Denies any pain. Pt calls appropriately, call light within reach. Bed in lowest position. Bed alarm off d/t pt being ind. Pt scratched 2 scabs on his L arm and caused them to open and bleed; RN cleaned area and applied band aides. Pt refused his [place] Megan cream during the night.      Problem: Adult Inpatient Plan of Care  Goal: Optimal Comfort and Wellbeing  Outcome: Progressing     Problem: Risk for Delirium  Goal: Improved Sleep  Outcome: Progressing

## 2024-04-27 NOTE — PROGRESS NOTES
"CLINICAL NUTRITION SERVICES - ASSESSMENT NOTE     Nutrition Prescription    RECOMMENDATIONS FOR MDs/PROVIDERS TO ORDER:  Recommend advance diet as able   MVI/M daily with poor oral intakes     Malnutrition Status:    Moderate malnutrition  In Context of:  Acute illness or injury    Recommendations already ordered by Registered Dietitian (RD):  ONS- Ensure clear Berry + Cherry Gel Plus     Future/Additional Recommendations:  Monitor po intake/diet adv., weight, BG, labs.      REASON FOR ASSESSMENT  Lance Ibrahim is a/an 79 year old male assessed by the dietitian for Admission Nutrition Risk Screen for unintentional weight loss, decreased appetite.     HPI: Pt with PMH of CAD, CHF, stroke, bph, htn, obesity, hyperlipidemia, diabetes who presented with complaints of rash, jaundice.  His rash and liver abnormality likely related to Keflex allergy.  Symptomatic treatment are started here.  GI was consulted and recommended MRCP.  He was also found to have metabolic acidosis with lactic elevation and mild leukocytosis on presentation.     NUTRITION HISTORY  Met with pt at bedside this AM. Pt reports poor appetite and oral intakes x1 week PTA. Pt notes okay oral intakes and appetite this admit on CL diet. Pt wanting to eat. Pt reporting weight loss, intentional. Pt denies nausea, vomiting, or abd pain. Pt agreeable to oral nutrition supplements with CL diet, [place] ensure clear and cherry gel plus.   NKFA    CURRENT NUTRITION ORDERS  Diet: Clear Liquid  Intake/Tolerance: <75% >7 days   4/26- x3 meals ordered, 100% Dinner, breakfast and lunch % po not noted   Pt ordered 776 kcals, 0 g protein     LABS  Reviewed  BUN 35.2(H)   Creat 1.72(H)   Alk Phos 605(H)   (H)   (H)   , 200 this AM    MEDICATIONS  Reviewed   Continuous LR 75 ml/hr   Scheduled norvasc, novolog, imdur, claritin    ANTHROPOMETRICS  Height: 182.9 cm (6' 0\")  Most Recent Weight: 96.1 kg (211 lb 12.8 oz)    IBW: 80.9 kg  BMI: Overweight BMI " 25-29.9  Weight History: 11.1% wt loss x1 year, 7.5% wt loss x6 months   Wt Readings from Last 10 Encounters:   04/27/24 96.1 kg (211 lb 12.8 oz)   04/17/24 100.7 kg (222 lb)   03/27/24 104.8 kg (231 lb)   02/27/24 108.4 kg (239 lb)   12/26/23 102.5 kg (226 lb)   12/04/23 103.4 kg (228 lb)   11/30/23 103.9 kg (229 lb 1.6 oz)   08/07/23 99.1 kg (218 lb 8 oz)   07/19/23 100.6 kg (221 lb 12.8 oz)   04/27/23 108.1 kg (238 lb 6.4 oz)     12/03/21 105.6 kg (232 lb 14.4 oz)     09/29/21 114 kg (251 lb 6.4 oz)     Dosing Weight: 84.7 kg adjusted wt    ASSESSED NUTRITION NEEDS  Estimated Energy Needs: 1532-3804 kcals/day (25 - 30 kcals/kg)  Justification: Maintenance  Estimated Protein Needs: 67-85+ grams protein/day (0.8 - 1+ grams of pro/kg)  Justification: Maintenance, Monitor labs   Estimated Fluid Needs: 2117 mL/day (25 mL/kg)   Justification: Maintenance    PHYSICAL FINDINGS/GI CONCERNS  See malnutrition section below.  Per Flowsheets:   GI: obese abd   BM: x2 this AM    MALNUTRITION:  % Weight Loss:  Weight loss does not meet criteria for malnutrition   % Intake:  <75% for > 7 days (moderate malnutrition)  Subcutaneous Fat Loss:  None observed  Muscle Loss:  Temporal region mild depletion and Clavicle bone region moderate depletion  Fluid Retention:  None noted    Malnutrition Diagnosis: Moderate malnutrition  In Context of:  Acute illness or injury    NUTRITION DIAGNOSIS  Malnutrition related to CL diet, poor appetite as evidenced by pt with poor oral intakes <75% >7 days, mild/moderate muscle loss.       INTERVENTIONS  Implementation  ONS- Ensure clear Berry + Cherry Gel Plus   Recommend advance diet as able     Goals  Diet advance   Pt to meet >75% nutritional needs   BG < 180      Monitoring/Evaluation  Progress toward goals will be monitored and evaluated per protocol.

## 2024-04-27 NOTE — PLAN OF CARE
Problem: Allergic/Anaphylactic Reaction  Goal: Resolution of Hypersensitivity Symptoms  Outcome: Progressing  Intervention: Manage Acute Allergic Reaction  Recent Flowsheet Documentation  Taken 4/27/2024 0735 by Abiel Cid RN  Medication Review/Management: medications reviewed     Problem: Allergic/Anaphylactic Reaction  Goal: Resolution of Hypersensitivity Symptoms  Intervention: Manage Acute Allergic Reaction  Recent Flowsheet Documentation  Taken 4/27/2024 0735 by Abiel Cid, RN  Medication Review/Management: medications reviewed   Goal Outcome Evaluation:       Pt A&O x4 and VSS. Denies pain, SOB, and chest pain. Scabbed rash scattered over body. c/o pruritus with some relief from scheduled medications. PIV SL. Tolerating low fiber diet. Call light within reach and calls appropriately.

## 2024-04-27 NOTE — PROGRESS NOTES
This patient no longer requires Contact Precautions because hospitalization or long-term care stay was not in a high-risk state..    April 27, 2024  Qiana Wood, Infection Prevention

## 2024-04-27 NOTE — PROGRESS NOTES
GASTROENTEROLOGY PROGRESS NOTE     SUBJECTIVE   Lance feels fine today.  He denies any abdominal pain, nausea or vomiting  He denies any fever or chills  No GI bleeding  Feels hungry, and would like to advance his diet.     OBJECTIVE     Vitals Blood pressure 129/61, pulse 86, temperature 97.9  F (36.6  C), temperature source Oral, resp. rate 18, height 1.829 m (6'), weight 96.1 kg (211 lb 12.8 oz), SpO2 96%.          Physical Exam  General: awake, alert, oriented times three   Abdomen: soft, non-tender, non-distended, bowel sounds present        LABORATORY    ELECTROLYTE PANEL   Recent Labs   Lab 04/27/24  1112 04/27/24  0655 04/27/24  0559 04/26/24  1057 04/26/24  0852 04/26/24  0439 04/26/24  0430   NA  --   --  138  --  136  --  138   POTASSIUM  --   --  4.1  --  3.8  --  3.8   CHLORIDE  --   --  109*  --  109*  --  111*   CO2  --   --  18*  --  16*  --  14*   * 200* 184*   < > 317*   < > 197*   CR  --   --  1.72*  --  1.93*  --  2.05*   BUN  --   --  35.2*  --  50.8*  --  51.7*    < > = values in this interval not displayed.      HEMATOLOGY PANEL   Recent Labs   Lab 04/27/24  0559 04/26/24  0430 04/25/24  2329   HGB 10.3* 11.9* 12.7*   MCV 86 87 86   WBC 7.8 11.4* 10.6    253 252   INR  --  1.11  --       LIVER AND PANCREAS PANEL   Recent Labs   Lab 04/27/24  0559 04/26/24  0430 04/25/24  2329   * 251* 250*   * 320* 330*   ALKPHOS 605* 589* 619*   BILITOTAL 7.1* 5.0* 4.9*   LIPASE 163* 444* 495*     IMAGING STUDIES        I have reviewed the current diagnostic and laboratory tests.              IMPRESSION   Lance Ibrahim is a pleasant 79 year old male with medical issues, hospitalized with a new onset rash.  On arrival, noted to have jaundice, along with his pruritus, and he did admit to some right upper quadrant abdominal pain.    Ultrasound was notable for intra and extrahepatic biliary ductal dilation.  An MRCP confirmed these findings, along with nonvisualization of distal CBD  within the pancreas.  There is also note of multiple cystic lesions, along the course of the pancreatic duct.    He is overall doing well, without any evidence for cholangitis.  His serum lipase was elevated at time of arrival, and this has continued to improve, as has his pain.  His LFTs continue to be elevated.    His findings are concerning, for possible obstructive jaundice, possibly related to choledocholithiasis, stricture, or pancreatic head mass.  He will certainly benefit from further evaluation with both an endoscopic ultrasound, as well as ERCP for biliary drainage and possible stenting.    Unfortunately, [place] of [place] [facility] and will need to be scheduled for early next week at [facility].    There is certainly a possibility that the use of his Keflex, have led to a drug-induced liver injury, as well as would explain his rash.  That being said, his imaging study findings on his MRI, cannot be explained by that.  Thus, he will still need to go ahead with at least the endoscopic ultrasound.    Overall, clinically he is doing well, and we could try advancing his diet, and if he does well, he could be able to go home, and then be scheduled for these procedures as an outpatient at [facility] next week.  If on the other hand, he does not tolerate advancing of his diet, or if he becomes more symptomatic, then he should stay hospitalized, and then we can transfer him over to [facility] next week, for these procedures.    I have discussed these options with him, as well as his son who was present in the room.  They are both agreeable to this plan.     RECOMMENDATION   Advance to a soft diet today.  Monitor LFTs  Continue with present management of pruritus  We will plan for EUS with possible ERCP next week at [facility], which can be planned as either an inpatient or an outpatient.    We will continue to follow along with you.              Daniel Schmidt MD  Thank you for the opportunity to participate in the care of this patient.   Please feel free to call me with any questions or concerns.  Phone number [phone].

## 2024-04-28 LAB
ALBUMIN MFR UR ELPH: 45.4 MG/DL
ALBUMIN SERPL BCG-MCNC: 3.3 G/DL (ref 3.5–5.2)
ALBUMIN UR-MCNC: 30 MG/DL
ALP SERPL-CCNC: 751 U/L (ref 40–150)
ALT SERPL W P-5'-P-CCNC: 332 U/L (ref 0–70)
ANION GAP SERPL CALCULATED.3IONS-SCNC: 12 MMOL/L (ref 7–15)
APPEARANCE UR: CLEAR
AST SERPL W P-5'-P-CCNC: 267 U/L (ref 0–45)
BILIRUB SERPL-MCNC: 8 MG/DL
BILIRUB UR QL STRIP: ABNORMAL
BUN SERPL-MCNC: 29.5 MG/DL (ref 8–23)
CALCIUM SERPL-MCNC: 9.5 MG/DL (ref 8.8–10.2)
CHLORIDE SERPL-SCNC: 110 MMOL/L (ref 98–107)
COLOR UR AUTO: ABNORMAL
CREAT SERPL-MCNC: 1.58 MG/DL (ref 0.67–1.17)
CREAT UR-MCNC: 57.2 MG/DL
CYSTATIN C (ROCHE): 2.5 MG/L (ref 0.6–1)
DEPRECATED HCO3 PLAS-SCNC: 16 MMOL/L (ref 22–29)
EGFRCR SERPLBLD CKD-EPI 2021: 44 ML/MIN/1.73M2
ERYTHROCYTE [DISTWIDTH] IN BLOOD BY AUTOMATED COUNT: 18.5 % (ref 10–15)
GFR SERPL CREATININE-BSD FRML MDRD: 21 ML/MIN/1.73M2
GLUCOSE BLDC GLUCOMTR-MCNC: 187 MG/DL (ref 70–99)
GLUCOSE BLDC GLUCOMTR-MCNC: 213 MG/DL (ref 70–99)
GLUCOSE BLDC GLUCOMTR-MCNC: 228 MG/DL (ref 70–99)
GLUCOSE BLDC GLUCOMTR-MCNC: 243 MG/DL (ref 70–99)
GLUCOSE BLDC GLUCOMTR-MCNC: 310 MG/DL (ref 70–99)
GLUCOSE SERPL-MCNC: 210 MG/DL (ref 70–99)
GLUCOSE UR STRIP-MCNC: 500 MG/DL
HCT VFR BLD AUTO: 33.2 % (ref 40–53)
HGB BLD-MCNC: 11.1 G/DL (ref 13.3–17.7)
HGB UR QL STRIP: ABNORMAL
KETONES UR STRIP-MCNC: NEGATIVE MG/DL
LEUKOCYTE ESTERASE UR QL STRIP: NEGATIVE
MCH RBC QN AUTO: 29 PG (ref 26.5–33)
MCHC RBC AUTO-ENTMCNC: 33.4 G/DL (ref 31.5–36.5)
MCV RBC AUTO: 87 FL (ref 78–100)
MUCOUS THREADS #/AREA URNS LPF: PRESENT /LPF
NITRATE UR QL: NEGATIVE
PH UR STRIP: 5.5 [PH] (ref 5–7)
PHOSPHATE SERPL-MCNC: 2.2 MG/DL (ref 2.5–4.5)
PLATELET # BLD AUTO: 216 10E3/UL (ref 150–450)
POTASSIUM SERPL-SCNC: 4 MMOL/L (ref 3.4–5.3)
PROT SERPL-MCNC: 6.5 G/DL (ref 6.4–8.3)
PROT/CREAT 24H UR: 0.79 MG/MG CR (ref 0–0.2)
RBC # BLD AUTO: 3.83 10E6/UL (ref 4.4–5.9)
RBC URINE: 2 /HPF
SODIUM SERPL-SCNC: 138 MMOL/L (ref 135–145)
SP GR UR STRIP: 1.01 (ref 1–1.03)
SQUAMOUS EPITHELIAL: <1 /HPF
UROBILINOGEN UR STRIP-MCNC: <2 MG/DL
WBC # BLD AUTO: 9.3 10E3/UL (ref 4–11)
WBC URINE: 2 /HPF

## 2024-04-28 PROCEDURE — 99222 1ST HOSP IP/OBS MODERATE 55: CPT | Performed by: NURSE PRACTITIONER

## 2024-04-28 PROCEDURE — 250N000013 HC RX MED GY IP 250 OP 250 PS 637: Performed by: HOSPITALIST

## 2024-04-28 PROCEDURE — 85027 COMPLETE CBC AUTOMATED: CPT | Performed by: STUDENT IN AN ORGANIZED HEALTH CARE EDUCATION/TRAINING PROGRAM

## 2024-04-28 PROCEDURE — 250N000013 HC RX MED GY IP 250 OP 250 PS 637: Performed by: STUDENT IN AN ORGANIZED HEALTH CARE EDUCATION/TRAINING PROGRAM

## 2024-04-28 PROCEDURE — 82040 ASSAY OF SERUM ALBUMIN: CPT | Performed by: STUDENT IN AN ORGANIZED HEALTH CARE EDUCATION/TRAINING PROGRAM

## 2024-04-28 PROCEDURE — 250N000011 HC RX IP 250 OP 636: Performed by: STUDENT IN AN ORGANIZED HEALTH CARE EDUCATION/TRAINING PROGRAM

## 2024-04-28 PROCEDURE — 99232 SBSQ HOSP IP/OBS MODERATE 35: CPT | Performed by: STUDENT IN AN ORGANIZED HEALTH CARE EDUCATION/TRAINING PROGRAM

## 2024-04-28 PROCEDURE — 81001 URINALYSIS AUTO W/SCOPE: CPT | Performed by: NURSE PRACTITIONER

## 2024-04-28 PROCEDURE — 84100 ASSAY OF PHOSPHORUS: CPT | Performed by: NURSE PRACTITIONER

## 2024-04-28 PROCEDURE — 84156 ASSAY OF PROTEIN URINE: CPT | Performed by: NURSE PRACTITIONER

## 2024-04-28 PROCEDURE — 250N000013 HC RX MED GY IP 250 OP 250 PS 637: Performed by: NURSE PRACTITIONER

## 2024-04-28 PROCEDURE — 82610 CYSTATIN C: CPT | Performed by: NURSE PRACTITIONER

## 2024-04-28 PROCEDURE — 36415 COLL VENOUS BLD VENIPUNCTURE: CPT | Performed by: STUDENT IN AN ORGANIZED HEALTH CARE EDUCATION/TRAINING PROGRAM

## 2024-04-28 PROCEDURE — 120N000001 HC R&B MED SURG/OB

## 2024-04-28 RX ORDER — LIDOCAINE 40 MG/G
CREAM TOPICAL
Status: CANCELLED | OUTPATIENT
Start: 2024-04-28

## 2024-04-28 RX ORDER — SODIUM BICARBONATE 650 MG/1
1300 TABLET ORAL 3 TIMES DAILY
Qty: 180 TABLET | Refills: 0 | Status: SHIPPED | OUTPATIENT
Start: 2024-04-28 | End: 2024-04-29

## 2024-04-28 RX ORDER — EMOLLIENT BASE
CREAM (GRAM) TOPICAL 3 TIMES DAILY
Qty: 113 G | Refills: 3 | Status: SHIPPED | OUTPATIENT
Start: 2024-04-28 | End: 2024-07-10

## 2024-04-28 RX ORDER — SODIUM BICARBONATE 650 MG/1
1300 TABLET ORAL 3 TIMES DAILY
Status: DISCONTINUED | OUTPATIENT
Start: 2024-04-28 | End: 2024-04-29

## 2024-04-28 RX ADMIN — ASPIRIN 81 MG: 81 TABLET, COATED ORAL at 08:30

## 2024-04-28 RX ADMIN — SODIUM BICARBONATE 650 MG TABLET 1300 MG: at 09:34

## 2024-04-28 RX ADMIN — AMLODIPINE BESYLATE 5 MG: 5 TABLET ORAL at 08:30

## 2024-04-28 RX ADMIN — ISOSORBIDE MONONITRATE 30 MG: 30 TABLET, EXTENDED RELEASE ORAL at 08:30

## 2024-04-28 RX ADMIN — HEPARIN SODIUM 5000 UNITS: 5000 INJECTION, SOLUTION INTRAVENOUS; SUBCUTANEOUS at 13:34

## 2024-04-28 RX ADMIN — HEPARIN SODIUM 5000 UNITS: 5000 INJECTION, SOLUTION INTRAVENOUS; SUBCUTANEOUS at 05:45

## 2024-04-28 RX ADMIN — LORATADINE 10 MG: 10 TABLET ORAL at 08:30

## 2024-04-28 RX ADMIN — HEPARIN SODIUM 5000 UNITS: 5000 INJECTION, SOLUTION INTRAVENOUS; SUBCUTANEOUS at 21:39

## 2024-04-28 RX ADMIN — DIPHENHYDRAMINE HYDROCHLORIDE 25 MG: 25 CAPSULE ORAL at 13:35

## 2024-04-28 RX ADMIN — SODIUM BICARBONATE 650 MG TABLET 1300 MG: at 13:34

## 2024-04-28 RX ADMIN — Medication: at 21:41

## 2024-04-28 RX ADMIN — SODIUM BICARBONATE 650 MG TABLET 1300 MG: at 21:40

## 2024-04-28 RX ADMIN — Medication: at 13:34

## 2024-04-28 RX ADMIN — Medication: at 08:33

## 2024-04-28 ASSESSMENT — ACTIVITIES OF DAILY LIVING (ADL)
ADLS_ACUITY_SCORE: 26
ADLS_ACUITY_SCORE: 25
ADLS_ACUITY_SCORE: 26
ADLS_ACUITY_SCORE: 25
ADLS_ACUITY_SCORE: 26
ADLS_ACUITY_SCORE: 25
ADLS_ACUITY_SCORE: 26
ADLS_ACUITY_SCORE: 26

## 2024-04-28 NOTE — PLAN OF CARE
Problem: Adult Inpatient Plan of Care  Goal: Optimal Comfort and Wellbeing  Outcome: Progressing     Problem: Allergic/Anaphylactic Reaction  Goal: Resolution of Hypersensitivity Symptoms  Intervention: Manage Acute Allergic Reaction  Recent Flowsheet Documentation  Taken 4/28/2024 0829 by Abiel Cid RN  Medication Review/Management: medications reviewed   Goal Outcome Evaluation:       Pt denies pain, SOB, and chest pain. C/O itching with relief from scheduled and prn medication. L PIV patent, and SL.  and 310. New orders placed. Call light within reach and calls appropriately.

## 2024-04-28 NOTE — PLAN OF CARE
Problem: Allergic/Anaphylactic Reaction  Goal: Resolution of Hypersensitivity Symptoms  Intervention: Manage Acute Allergic Reaction  Recent Flowsheet Documentation  Taken 4/27/2024 1546 by Tj Payne RN  Medication Review/Management: medications reviewed   Goal Outcome Evaluation:       No c/o itching this shift. Alert and oriented x 4. Tolerating low fiber diet. Ambulating in room with SBA, and walker. Pleasant and cooperative. Bed alarm on for safety. Using call light appropriately.

## 2024-04-28 NOTE — CONSULTS
RENAL CONSULT NOTE    REQUESTING PHYSICIAN: MD Ana Paula    REASON FOR CONSULT:     metabolic acidosis     PLAN:  Add sodium bicarb 1300 TID for discharge  Recommend f/up renal labs at his PCP in the next week or so, I have LM for our office to call and get him set up with clnic appt in the next 2-4weeks to address CKD, acidosis   Retire Metformin d/t acidosis risk with advanced CKD  OK to resume ACE for discharge given RF near baseline range   Educated on using Torsemide as needed for HTN, wt gain etc, but holding when fluid intake is very low   ? ERCP next week inpt or outpt to further eval jaundice and ? Panc head mass, pt insisting on discharge today with outpt f/up (discussed with DR Goss)    ASSESSMENT/PLAN:  Acute on Chronic Kidney Dz (3):  Non-oliguric, likely pre-renal DEAN, reported very low intake and dehydration PTA with ongoing diuretic use, high bili may be contributing to some degree   Baseline sCR in the 1.3-1.6 2021-22, with progressive decline and variability 1.5-2.2 in 2023  sCr peaked 2.2 on admission, improved  to 1.6, his presumed baseline more recently (eGFR 30-40 range )  H/o intermittent microhematuria, harshal UA/micro   Historically modest proteinuria by dip, no recent quant, ck up/c, no h/o MDS, ck serum immunofix given persistent proteinuria (though likely d/t DMN, cannot r/o occult etiol, shu with h/o microhematuria)  CT A/P: no renal stones or hydro, cyst L    Acute on mild chronic Metabolic acidosis:  Lactate initially 3-->1.8 post IVF's  Advancing CKD, retained Hydrogen and reduced ammonia excretion-->worsening acidosis, with Normal K (ck Phos) , may also have mild hypercholremic acidosis with IVF's   Would RETIRE Metformin with potential to contribute to worsening acidosis with reduced RF  Add oral bicarb and trend     Hypophos:  ? D/t RTA vs very low intake PTA , enc phos intake     DM (2);  With neuropathy (on Gabapentin, high dose, consider renal dosing),   likely nephropathy with  proteinuria, on ACE  Sliding scale insulin per [facility]  PTA Metformin (with DEAN/Lactic acidosis, would retire given risk of exacerbating acidosis) and  Semaglutide    Elevated bilirubin:  MRCO sggestive of obstructive jaundice ? Choleduocholithiasis, stricute and possible panc head mass, planning ERCP next week , GI following     Anemia:  On oral iron PTA   Hgb 10-11, not yet needing BUFFY therapy     H/o BPH:  Has followed with urology, last seen 2/2023 (DR Scales) , prior urolift, flomax dc'd at that visit     Pulm nodule:  As per [facility], plans for outpt w/up, surveillance       HPI: Lance Ibrahim is a 79 year old male for whom we have been asked to see for metabolic acidosis, in the setting of recent DEAN on CKD 3.  HE is new to our service.  Baseline SCR variable, more recently in the 1.6-2 range (corresponding eGFR in the 30-40s. He is diabetic.   Prior remote UA's with consister pH in the 5's, intermittent protein by dip, variable RBC's of unclear significance (UTI's?), prior c02 levels typically in the low 20's.  Lactate elevated on admission, has normalized post IVF's     He presented with c/porash and sudden onset jaundice, after taking Keflex.  He was started on a short course of Prednisone PTA He did have an MRCP showing CBD dilatation and intraductal papillary mucinous neoplasm, GI has seen, rec further eval with endoscopic US/ERCP, which would require a transfer to [facility] vs outpt scheduling.  He is wanting to discharge today.  HE does not follow with renal outpt and I recommended that he do so, in light of recent DEAN CKD and MA.  I will message our office to set this up.   PMHX sig for ASCAD, HF, BPH, HTN, DM     REVIEW OF SYSTEMS:  COMPLETE REVIEW OF SYSTEMS: General: see HPI  Eyes: denies acute issues   Ears/Nose/Throat: denies acute issues  Cardiovascular: denies CP  Respiratory: no SOB  Gastrointestinal: denies pain, see HPI  Musculoskeletal: gen weakness, needing assist to get up  Skin:  "jaundiced  Neurologic: denies acute issues  Psychiatric: does NOT want to remain admitted, \" I have to rely on everyone for everything.\"       Past Medical History:   Diagnosis Date    Anemia     Arthritis     osteoarthritis knees    BPH     CAD (coronary artery disease)     subtotal occlusion in the small distal LAD     Cardiomyopathy     Cardiomyopathy (H)     Cerebral artery occlusion with cerebral infarction     TIA , no residual    Cervicalgia     Chronic kidney disease     Chronic low back pain     Chronic rhinitis     Chronic rhinitis     Gouty arthropathy     HTN (hypertension)     Hyperlipidemia     Hypertension     Kidney stone     Nocturia     Obesity     PVD (peripheral vascular disease)     Sleep apnea     doesn't use cpap    TIA (transient ischaemic attack)     Type 2 diabetes mellitus - 2018    Ureteral stone        I have reviewed this patient's family history and updated it with pertinent information if needed.  Family History   Problem Relation Age of Onset    Family History Negative Mother          88 yo    Cancer Father          76 yo brain    Diabetes Maternal Grandfather          89 yo    Alcohol/Drug Paternal Grandfather             Colon Cancer No family hx of          Social History     Tobacco Use    Smoking status: Former     Current packs/day: 0.00     Types: Cigarettes     Quit date: 3/17/1993     Years since quittin.1    Smokeless tobacco: Never   Vaping Use    Vaping status: Never Used   Substance Use Topics    Alcohol use: Not Currently    Drug use: No          Current Facility-Administered Medications   Medication Dose Route Frequency Provider Last Rate Last Admin    amLODIPine (NORVASC) tablet 5 mg  5 mg Oral Daily Candie Goss MD   5 mg at 24 0739    aspirin EC tablet 81 mg  81 mg Oral Daily Candie Goss MD   81 mg at 24 0739    [Held by provider] atorvastatin (LIPITOR) tablet 40 mg  40 mg Oral Daily Candie Goss MD  "       benzocaine-menthol (CEPACOL) 15-3.6 MG lozenge 1 lozenge  1 lozenge Buccal Q1H PRN Rickey Hinton,         calcium carbonate (TUMS) chewable tablet 1,000 mg  1,000 mg Oral 4x Daily PRN Rickey Hinton DO        glucose gel 15-30 g  15-30 g Oral Q15 Min PRN Rickey Hinton,         Or    dextrose 50 % injection 25-50 mL  25-50 mL Intravenous Q15 Min PRN Rickey Hinton,         Or    glucagon injection 1 mg  1 mg Subcutaneous Q15 Min PRN Rickey Hinton, DO        diphenhydrAMINE (BENADRYL) capsule 25 mg  25 mg Oral Q6H PRN Rickey Hinton, DO   25 mg at 04/27/24 2345    Or    diphenhydrAMINE (BENADRYL) injection 25 mg  25 mg Intravenous Q6H PRN Rickey Hinton, DO   25 mg at 04/26/24 0844    emollient (VANICREAM) cream   Topical TID Candie Goss MD   Given at 04/27/24 1332    guaiFENesin-dextromethorphan (ROBITUSSIN DM) 100-10 MG/5ML syrup 10 mL  10 mL Oral Q4H PRN Rickey Hinton DO        heparin ANTICOAGULANT injection 5,000 Units  5,000 Units Subcutaneous Q8H Candie Goss MD   5,000 Units at 04/28/24 0545    hydrocortisone 2.5 % cream   Topical BID PRN Rickey Hinton DO   1 applicator at 04/26/24 0537    insulin aspart (NovoLOG) injection (RAPID ACTING)  1-7 Units Subcutaneous TID AC Candie Goss MD   2 Units at 04/28/24 0645    insulin aspart (NovoLOG) injection (RAPID ACTING)  1-5 Units Subcutaneous At Bedtime Candie Goss MD   1 Units at 04/27/24 2112    isosorbide mononitrate (IMDUR) 24 hr tablet 30 mg  30 mg Oral Daily Candie Goss MD   30 mg at 04/27/24 0739    lidocaine (LMX4) cream   Topical Q1H PRN Rickey Hinton DO        lidocaine 1 % 0.1-1 mL  0.1-1 mL Other Q1H PRN Rickey Hinton DO        [Held by provider] lisinopril (ZESTRIL) tablet 10 mg  10 mg Oral Q24H Candie Goss MD        loratadine (CLARITIN) tablet 10 mg  10 mg Oral Daily Candie Goss MD   10 mg at 04/27/24 0739    [Held by provider] metFORMIN (GLUCOPHAGE XR) 24 hr tablet 500 mg  500 mg Oral BID w/meals Candie Goss MD    "     ondansetron (ZOFRAN ODT) ODT tab 4 mg  4 mg Oral Q6H PRN Rickey Hinton DO        Or    ondansetron (ZOFRAN) injection 4 mg  4 mg Intravenous Q6H PRN Rickey Hinton DO        prochlorperazine (COMPAZINE) injection 5 mg  5 mg Intravenous Q6H PRN Rickey Hinton,         Or    prochlorperazine (COMPAZINE) tablet 5 mg  5 mg Oral Q6H PRN Rickey Hinton,         Or    prochlorperazine (COMPAZINE) suppository 12.5 mg  12.5 mg Rectal Q12H PRN Rickey Hinton DO        senna-docusate (SENOKOT-S/PERICOLACE) 8.6-50 MG per tablet 1 tablet  1 tablet Oral BID PRN Rickey Hinton DO   1 tablet at 04/26/24 1452    Or    senna-docusate (SENOKOT-S/PERICOLACE) 8.6-50 MG per tablet 2 tablet  2 tablet Oral BID PRN Rickey Hinton DO        sodium chloride (PF) 0.9% PF flush 3 mL  3 mL Intracatheter Q8H Rickey Hinton, DO   3 mL at 04/28/24 0243    sodium chloride (PF) 0.9% PF flush 3 mL  3 mL Intracatheter q1 min prn Rickey Hinton,    3 mL at 04/27/24 1559    [Held by provider] torsemide (DEMADEX) tablet 20 mg  20 mg Oral Daily Candie Goss MD             ALLERGIES/SENSITIVITIES:  Allergies   Allergen Reactions    Penicillins Anaphylaxis     8/25/22 - tolerated cefepime     Sulfa Antibiotics      \"itchy rash, swelling of face and hands\"    Ancef [Cefazolin] Rash    Cephalexin Itching and Rash          PHYSICAL EXAM:  BP (!) 161/75   Pulse 91   Temp 97.7  F (36.5  C)   Resp 16   Ht 1.829 m (6')   Wt 96.3 kg (212 lb 6.4 oz)   SpO2 98%   BMI 28.81 kg/m      Patient Vitals for the past 72 hrs:   Weight   04/28/24 0300 96.3 kg (212 lb 6.4 oz)   04/27/24 0447 96.1 kg (211 lb 12.8 oz)   04/26/24 1300 95.6 kg (210 lb 12.2 oz)   04/26/24 0740 93.4 kg (206 lb)     Body mass index is 28.81 kg/m .    Intake/Output Summary (Last 24 hours) at 4/28/2024 0832  Last data filed at 4/28/2024 0741  Gross per 24 hour   Intake 660 ml   Output --   Net 660 ml         General - A & O x 3, NAD  HEENT - PERRLA, +scleral icterus  Neck - no " carotid bruits, no JVD  Respiratory - on RA , sats ok   Cardiovascular - SBP mostly controlled, rate 90s   Abdomen - soft, distended  Extremities - well perfused, no sig  edema  Integumentary - diffuse rash/lesions  Neurologic - grossly intact  Psych:  Judgement intact, affect WNL  :  not doc, is voiding     Laboratory:  Recent Labs   Lab Test 04/28/24 0523 04/27/24  0559 04/26/24  0430 06/16/22  0934 12/25/21 2000   WBC 9.3 7.8 11.4*   < > 18.6*   HGB 11.1* 10.3* 11.9*   < > 12.7*   MCV 87 86 87   < > 91    199 253   < > 379   INR  --   --  1.11  --  1.17*    < > = values in this interval not displayed.      Recent Labs   Lab Test 04/28/24 0523 04/27/24 0559   POTASSIUM 4.0 4.1   CHLORIDE 110* 109*   BUN 29.5* 35.2*      Recent Labs   Lab Test 04/28/24  0523 04/27/24  0559 06/16/22  0934 12/03/21  1415 07/28/20  0626 01/24/20  1359   ALBUMIN 3.3* 3.3*   < >  --    < > 3.6   BILITOTAL 8.0* 7.1*   < >  --    < > 0.3   * 301*   < >  --    < > 37   * 268*   < >  --    < > 21   PROTEIN  --   --   --  30*  --  Negative    < > = values in this interval not displayed.       Personally reviewed today's laboratory studies      Thank you for involving us in the care of this patient. We will continue to follow along with you.      Di Diego, FNP-BC  [facility]  [phone]

## 2024-04-28 NOTE — PLAN OF CARE
Problem: Adult Inpatient Plan of Care  Goal: Absence of Hospital-Acquired Illness or Injury  Intervention: Prevent Skin Injury  Recent Flowsheet Documentation  Taken 4/28/2024 0019 by Odin Muhammad RN  Body Position:   supine   position changed independently  Skin Protection: adhesive use limited  Device Skin Pressure Protection: adhesive use limited     Problem: Risk for Delirium  Goal: Improved Sleep  Intervention: Promote Sleep  Recent Flowsheet Documentation  Taken 4/28/2024 0019 by Odin Muhammad RN  Sleep/Rest Enhancement:   regular sleep/rest pattern promoted   relaxation techniques promoted   room darkened     Problem: Allergic/Anaphylactic Reaction  Goal: Resolution of Hypersensitivity Symptoms  Intervention: Manage Acute Allergic Reaction  Recent Flowsheet Documentation  Taken 4/28/2024 0019 by Odin Muhammad RN  Medication Review/Management: medications reviewed   Goal Outcome Evaluation:     Patient alert and oriented x 4, speech clear.  Denies pain.  No SOB.  Patient has a rash which appears like dry scabs and some open on entire body.  Patient given Benadryl prn.  Patient requested a shower to decrease the itchy feeling.  Shower given, gown and linens changed.  PIV SL and flushed and capped.  SBA 1 with rolling seated walker gait steady.  Trace edema of LE.  Patient up most of night watching TV.  Voiding without difficulty.

## 2024-04-28 NOTE — PROGRESS NOTES
[facility] MEDICINE  PROGRESS NOTE       Securely message me with Fiordaliza (more info)    Code Status: Full Code       Identification/Summary:   Lance Ibrahim is a 79 year old male with PMH of CAD, CHF, stroke, bph, htn, obesity, hyperlipidemia, diabetes who presented with complaints of rash, jaundice.  His rash and liver abnormality likely related to Keflex allergy.  Symptomatic treatment are started here.  GI was consulted and recommended MRCP, which showed common bile duct dilation without finding of stenosis point.  He was also found to have metabolic acidosis with lactic elevation and mild leukocytosis on presentation.  GI recommended ERCP, possible to Tuesday at [facility].  His acidosis persists so nephrology was consulted.  His glucose continues to uptrend while metformin and semaglutide are close to contraindicated so he requires insulin.  He agrees to stay for another night to learn to use insulin.     Barriers to Discharge: Hyperglycemia, due to insulin     Disposition: Inpatient     Assessment and Plan:  Rash  Hyperbilirubinemia with direct dominance  4/3 started Keflex  4/7 rash started  4/17 PCP started prednisone package  4/20 went to ED and got Benadryl  4/26 presented to ED with persistent rash and juandice  There is also elevated lipase.  There etiology of urinary tract obstruction cannot be ruled out.  Acute hepatitis panel pending.  Acetaminophen level less than detectable volume.  Denies bug bites no history of travel.  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 the earliest time it could happen is to stay at [facility].  - will hold off on prednisone  - lotion for itching, diphenhydramine prn  - hold statin  - GI advancing diet to regular on 4/27  -Was unable to get help dermatology on 4/26     Metabolic acidosis  Did have lactic acidosis which has  resolved on admission.  Now symptoms other etiologies are ruled out and RTA is possible.  -Nephrology consulted on 4/28, bicarb tablet is started  -Metformin needs to retire and I believe semaglutide too  -Other possibility is underlying infection, possible sources include his skin, spinal stimulator site    Pain with spinal stimulator  It was implanted on 4/3/2024 at [facility] pain clinic with Dr. Terence Nash.  His antibiotic course was aborted because of the rash by himself.  He denies symptoms at the spot of implantation.  Per CT on admission, spinal stimulator implanted in the subcutaneous fat of the right buttock.   -Will reach out to their clinic on Monday for more guidance [phone]    Lactic acidosis -resolved  DEAN versus CKD-improving  Creatinine is 2.0 higher than the previous one but comparable to historic data  pH 7.28, lactic acid 3 > 1.8 with IVF. No anion gap though. Other causes include RTA, DKA with semaglutide (negative beta hydroxybutyric acid)  - Continue IV fluid and monitor, IV fluid discontinued  - Monitor BMP tomorrow  - hold torsemide for now     Leukocytosis  Certainly there is risk of infection given scratching and a few open spots on his rash.  Could also be from prednisone use or hepatitis or rash itself.  No fever.  -Follow up on blood culture  -Low threshold to start antibiotics  -Downtrended now after prednisone was stopped     Dyspnea  Reported dyspnea on exertion for the past few days that he attributes to lack of sleep  Close monitoring while on IV fluid, stopped     DM neuropathy  Chronic pain  Stimulator placed early this month  He stopped his Keflex when he had the rash.  Will have him contact his clinic for more guidance on antibiotics  Glucose continues to uptrend to a point that he requires insulin now that metformin and semaglutide are contraindicated due to CKD     DM2  Sliding scale insulin prn, added meal dose 3u> 0u>2u tid with meals  Typically on metformin hold,  semaglutide hold  -Diabetic education with RN     Anticoagulation   heparin     Incidental pulmonary nodule/infiltration  Will need outpt surveillance           Anticoagulation   Orders (Includes Only Anticoagulants)  heparin ANTICOAGULANT, 5,000 Units, Subcutaneous, Q8H      Therapy: PT  Barnes:Not present  Lines: None       Current Diet  Orders Placed This Encounter      Combination Diet Low Fiber; Moderate Consistent Carb (60 g CHO per Meal) Diet        Clinically Significant Risk Factors              # Hypoalbuminemia: Lowest albumin = 3.3 g/dL at 4/28/2024  5:23 AM, will monitor as appropriate     # Hypertension: Noted on problem list       # DMII: A1C = 8.0 % (Ref range: 0.0 - 5.6 %) within past 6 months, PRESENT ON ADMISSION  # Overweight: Estimated body mass index is 28.81 kg/m  as calculated from the following:    Height as of this encounter: 1.829 m (6').    Weight as of this encounter: 96.3 kg (212 lb 6.4 oz)., PRESENT ON ADMISSION  # Moderate Malnutrition: based on nutrition assessment, PRESENT ON ADMISSION          Interval History/Subjective:  Patient reports he is doing okay.  He has tolerated a regular diet without nausea.  He wants to go home badly.  He is still itching like he was.      Last 24H PRN:     diphenhydrAMINE (BENADRYL) capsule 25 mg, 25 mg at 04/27/24 2345 **OR** diphenhydrAMINE (BENADRYL) injection 25 mg, 25 mg at 04/26/24 0844    sodium chloride (PF) 0.9% PF flush 3 mL, 3 mL at 04/27/24 1559    Physical Exam/Objective:  Temp:  [97.7  F (36.5  C)-98.8  F (37.1  C)] 97.7  F (36.5  C)  Pulse:  [83-93] 91  Resp:  [16] 16  BP: (139-161)/(65-75) 161/75  SpO2:  [96 %-98 %] 98 %  Wt Readings from Last 4 Encounters:   04/28/24 96.3 kg (212 lb 6.4 oz)   04/17/24 100.7 kg (222 lb)   03/27/24 104.8 kg (231 lb)   02/27/24 108.4 kg (239 lb)     Body mass index is 28.81 kg/m .    General Appearance: Alert and wake, not in distress  Respiratory: clear lungs, no crackles or wheezing  Cardiovascular:  rhythmic, normal S1 and S2, no murmur  GI: soft, non-tender, normal bowel sound  Neurology: oriented x 3  Psych: cooperative and calm, normal affect  Skin:Vesicular papular rash diffusely on his torso.  No lesion on the scalp or oral cavity.  Many vesicles have ruptured and dried up.  2 spots of scratched lesion look like healed small ulcers.    Medications:   Personally Reviewed.  Medications   Current Facility-Administered Medications   Medication Dose Route Frequency Provider Last Rate Last Admin     Current Facility-Administered Medications   Medication Dose Route Frequency Provider Last Rate Last Admin    amLODIPine (NORVASC) tablet 5 mg  5 mg Oral Daily Candie Goss MD   5 mg at 04/28/24 0830    aspirin EC tablet 81 mg  81 mg Oral Daily Candie Goss MD   81 mg at 04/28/24 0830    [Held by provider] atorvastatin (LIPITOR) tablet 40 mg  40 mg Oral Daily Candie Goss MD        emollient (VANICREAM) cream   Topical TID Candie Goss MD   Given at 04/28/24 0833    heparin ANTICOAGULANT injection 5,000 Units  5,000 Units Subcutaneous Q8H Candie Goss MD   5,000 Units at 04/28/24 0545    insulin aspart (NovoLOG) injection (RAPID ACTING)  1-3 Units Subcutaneous TID AC Candie Goss MD        insulin aspart (NovoLOG) injection (RAPID ACTING)  1-3 Units Subcutaneous At Bedtime Candie Goss MD        insulin aspart (NovoLOG) injection (RAPID ACTING)  2 Units Subcutaneous TID w/meals Candie Goss MD        insulin aspart (NovoLOG) injection (RAPID ACTING)  1-7 Units Subcutaneous TID AC Candie Goss MD   4 Units at 04/28/24 1149    insulin aspart (NovoLOG) injection (RAPID ACTING)  1-5 Units Subcutaneous At Bedtime Candie Goss MD   1 Units at 04/27/24 2112    isosorbide mononitrate (IMDUR) 24 hr tablet 30 mg  30 mg Oral Daily Candie Goss MD   30 mg at 04/28/24 0830    [Held by provider] lisinopril (ZESTRIL) tablet 10 mg  10 mg Oral Q24H Candie Goss MD        loratadine (CLARITIN) tablet 10 mg  10 mg Oral Daily  Candie Goss MD   10 mg at 04/28/24 0830    [Held by provider] metFORMIN (GLUCOPHAGE XR) 24 hr tablet 500 mg  500 mg Oral BID w/meals Candie Goss MD        sodium bicarbonate tablet 1,300 mg  1,300 mg Oral TID [place] Dialfredo CONSTANTINO NP   1,300 mg at 04/28/24 0934    sodium chloride (PF) 0.9% PF flush 3 mL  3 mL Intracatheter Q8H Rickey Hinton DO   3 mL at 04/28/24 1149    [Held by provider] torsemide (DEMADEX) tablet 20 mg  20 mg Oral Daily Candie Goss MD           Data reviewed today: I personally reviewed all new medications, labs, imaging/diagnostics reports over the past 24 hours. Pertinent findings include:    Imaging:   No results found for this or any previous visit (from the past 24 hour(s)).    Labs:  MR Abdomen MRCP w/o & w Contrast   Final Result   IMPRESSION:   1.  Bile ducts dilatation and gallbladder distention due to narrowing or occlusion of the common bile duct. The source of narrowing is not identified on this study, though a pancreatic mass is possible. ERCP should be considered if not already performed.   2.  Multiple small cystic lesions along the course of the pancreatic duct. 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 Recommend CT or MRI/MRCP in 6 months.      REFERENCE:   Revisions of international consensus [place] guidelines for the management of IPMN of the pancreas. Pancreatology 2017;17(5):738-753.      Largest cyst less than 10 mm: CT or MRI/MRCP in 6 months and then every 2 years if no change.         CT Abdomen Pelvis w/o Contrast   Final Result   IMPRESSION:    1.  Left lower lobe nodule or nodular infiltrate, incompletely imaged on this exam. Follow-up recommended.   2.  No other acute abnormality.   3.  Colonic diverticula without acute diverticulitis. No bowel obstruction or inflammation.         US Abdomen Limited   Final Result   IMPRESSION:   1.  Biliary dilatation without cause of obstruction seen.   2.  Fatty infiltration of the liver.               Recent Results (from the past 24 hour(s))   Glucose by meter    Collection Time: 04/27/24  5:38 PM   Result Value Ref Range    GLUCOSE BY METER POCT 208 (H) 70 - 99 mg/dL   Glucose by meter    Collection Time: 04/27/24  8:53 PM   Result Value Ref Range    GLUCOSE BY METER POCT 228 (H) 70 - 99 mg/dL   Glucose by meter    Collection Time: 04/28/24  1:30 AM   Result Value Ref Range    GLUCOSE BY METER POCT 187 (H) 70 - 99 mg/dL   Comprehensive metabolic panel    Collection Time: 04/28/24  5:23 AM   Result Value Ref Range    Sodium 138 135 - 145 mmol/L    Potassium 4.0 3.4 - 5.3 mmol/L    Carbon Dioxide (CO2) 16 (L) 22 - 29 mmol/L    Anion Gap 12 7 - 15 mmol/L    Urea Nitrogen 29.5 (H) 8.0 - 23.0 mg/dL    Creatinine 1.58 (H) 0.67 - 1.17 mg/dL    GFR Estimate 44 (L) >60 mL/min/1.73m2    Calcium 9.5 8.8 - 10.2 mg/dL    Chloride 110 (H) 98 - 107 mmol/L    Glucose 210 (H) 70 - 99 mg/dL    Alkaline Phosphatase 751 (H) 40 - 150 U/L     (H) 0 - 45 U/L     (H) 0 - 70 U/L    Protein Total 6.5 6.4 - 8.3 g/dL    Albumin 3.3 (L) 3.5 - 5.2 g/dL    Bilirubin Total 8.0 (H) <=1.2 mg/dL   CBC with platelets    Collection Time: 04/28/24  5:23 AM   Result Value Ref Range    WBC Count 9.3 4.0 - 11.0 10e3/uL    RBC Count 3.83 (L) 4.40 - 5.90 10e6/uL    Hemoglobin 11.1 (L) 13.3 - 17.7 g/dL    Hematocrit 33.2 (L) 40.0 - 53.0 %    MCV 87 78 - 100 fL    MCH 29.0 26.5 - 33.0 pg    MCHC 33.4 31.5 - 36.5 g/dL    RDW 18.5 (H) 10.0 - 15.0 %    Platelet Count 216 150 - 450 10e3/uL   Phosphorus    Collection Time: 04/28/24  5:23 AM   Result Value Ref Range    Phosphorus 2.2 (L) 2.5 - 4.5 mg/dL   Cystatin C with GFR    Collection Time: 04/28/24  5:23 AM   Result Value Ref Range    Cystatin C 2.5 (H) 0.6 - 1.0 mg/L    GFR Calculated with Cystatin C 21 (L) >=60 mL/min/1.73m2   Glucose by meter    Collection Time: 04/28/24  6:38 AM   Result Value Ref Range    GLUCOSE BY METER POCT 213 (H) 70 - 99 mg/dL   UA with Microscopic     Collection Time: 04/28/24  9:38 AM   Result Value Ref Range    Color Urine Dark Yellow (A) Colorless, Straw, Light Yellow, Yellow    Appearance Urine Clear Clear    Glucose Urine 500 (A) Negative mg/dL    Bilirubin Urine 1.0 mg/dL (A) Negative    Ketones Urine Negative Negative mg/dL    Specific Gravity Urine 1.014 1.001 - 1.030    Blood Urine 0.03 mg/dL (A) Negative    pH Urine 5.5 5.0 - 7.0    Protein Albumin Urine 30 (A) Negative mg/dL    Urobilinogen Urine <2.0 <2.0 mg/dL    Nitrite Urine Negative Negative    Leukocyte Esterase Urine Negative Negative    Mucus Urine Present (A) None Seen /LPF    RBC Urine 2 <=2 /HPF    WBC Urine 2 <=5 /HPF    Squamous Epithelials Urine <1 <=1 /HPF   Glucose by meter    Collection Time: 04/28/24 11:51 AM   Result Value Ref Range    GLUCOSE BY METER POCT 310 (H) 70 - 99 mg/dL       Pending Labs:  Unresulted Labs Ordered in the Past 30 Days of this Admission       Date and Time Order Name Status Description    4/28/2024  8:50 AM Protein Immunofixation Serum In process     4/26/2024  8:42 AM Blood Culture Peripheral Blood Preliminary     4/26/2024  8:42 AM Blood Culture Peripheral Blood Preliminary             I spoke with patient's son Omari to discuss patient's care.    DERICK MARTINS MD  [facility] Medicine  [facility]  Phone: #[phone]    Securely message me with Fiordaliza (more info)

## 2024-04-29 ENCOUNTER — ANESTHESIA EVENT (OUTPATIENT)
Dept: SURGERY | Facility: HOSPITAL | Age: 79
End: 2024-04-29
Payer: COMMERCIAL

## 2024-04-29 ENCOUNTER — APPOINTMENT (OUTPATIENT)
Dept: PHYSICAL THERAPY | Facility: CLINIC | Age: 79
DRG: 445 | End: 2024-04-29
Attending: STUDENT IN AN ORGANIZED HEALTH CARE EDUCATION/TRAINING PROGRAM
Payer: COMMERCIAL

## 2024-04-29 LAB
ALBUMIN SERPL BCG-MCNC: 3.4 G/DL (ref 3.5–5.2)
ALP SERPL-CCNC: 797 U/L (ref 40–150)
ALT SERPL W P-5'-P-CCNC: 316 U/L (ref 0–70)
ANION GAP SERPL CALCULATED.3IONS-SCNC: 15 MMOL/L (ref 7–15)
AST SERPL W P-5'-P-CCNC: 229 U/L (ref 0–45)
BILIRUB SERPL-MCNC: 8.5 MG/DL
BUN SERPL-MCNC: 26.2 MG/DL (ref 8–23)
CALCIUM SERPL-MCNC: 9.5 MG/DL (ref 8.8–10.2)
CHLORIDE SERPL-SCNC: 109 MMOL/L (ref 98–107)
CREAT SERPL-MCNC: 1.64 MG/DL (ref 0.67–1.17)
DEPRECATED HCO3 PLAS-SCNC: 15 MMOL/L (ref 22–29)
EGFRCR SERPLBLD CKD-EPI 2021: 42 ML/MIN/1.73M2
GLUCOSE BLDC GLUCOMTR-MCNC: 201 MG/DL (ref 70–99)
GLUCOSE BLDC GLUCOMTR-MCNC: 216 MG/DL (ref 70–99)
GLUCOSE BLDC GLUCOMTR-MCNC: 256 MG/DL (ref 70–99)
GLUCOSE BLDC GLUCOMTR-MCNC: 279 MG/DL (ref 70–99)
GLUCOSE SERPL-MCNC: 213 MG/DL (ref 70–99)
HOLD SPECIMEN: NORMAL
POTASSIUM SERPL-SCNC: 4.2 MMOL/L (ref 3.4–5.3)
PROT PATTERN SERPL IFE-IMP: NORMAL
PROT SERPL-MCNC: 6.7 G/DL (ref 6.4–8.3)
SODIUM SERPL-SCNC: 139 MMOL/L (ref 135–145)

## 2024-04-29 PROCEDURE — 250N000013 HC RX MED GY IP 250 OP 250 PS 637: Performed by: PHYSICIAN ASSISTANT

## 2024-04-29 PROCEDURE — 250N000013 HC RX MED GY IP 250 OP 250 PS 637: Performed by: STUDENT IN AN ORGANIZED HEALTH CARE EDUCATION/TRAINING PROGRAM

## 2024-04-29 PROCEDURE — 250N000012 HC RX MED GY IP 250 OP 636 PS 637: Performed by: STUDENT IN AN ORGANIZED HEALTH CARE EDUCATION/TRAINING PROGRAM

## 2024-04-29 PROCEDURE — 86334 IMMUNOFIX E-PHORESIS SERUM: CPT | Mod: 26 | Performed by: PATHOLOGY

## 2024-04-29 PROCEDURE — 120N000001 HC R&B MED SURG/OB

## 2024-04-29 PROCEDURE — 250N000013 HC RX MED GY IP 250 OP 250 PS 637: Performed by: NURSE PRACTITIONER

## 2024-04-29 PROCEDURE — 250N000013 HC RX MED GY IP 250 OP 250 PS 637: Performed by: HOSPITALIST

## 2024-04-29 PROCEDURE — 99232 SBSQ HOSP IP/OBS MODERATE 35: CPT | Performed by: NURSE PRACTITIONER

## 2024-04-29 PROCEDURE — 97161 PT EVAL LOW COMPLEX 20 MIN: CPT | Mod: GP

## 2024-04-29 PROCEDURE — 36415 COLL VENOUS BLD VENIPUNCTURE: CPT | Performed by: STUDENT IN AN ORGANIZED HEALTH CARE EDUCATION/TRAINING PROGRAM

## 2024-04-29 PROCEDURE — 99232 SBSQ HOSP IP/OBS MODERATE 35: CPT | Performed by: STUDENT IN AN ORGANIZED HEALTH CARE EDUCATION/TRAINING PROGRAM

## 2024-04-29 PROCEDURE — 80053 COMPREHEN METABOLIC PANEL: CPT | Performed by: STUDENT IN AN ORGANIZED HEALTH CARE EDUCATION/TRAINING PROGRAM

## 2024-04-29 PROCEDURE — 250N000011 HC RX IP 250 OP 636: Performed by: STUDENT IN AN ORGANIZED HEALTH CARE EDUCATION/TRAINING PROGRAM

## 2024-04-29 RX ORDER — DIPHENHYDRAMINE HYDROCHLORIDE 50 MG/ML
25 INJECTION INTRAMUSCULAR; INTRAVENOUS EVERY 6 HOURS PRN
Status: CANCELLED | OUTPATIENT
Start: 2024-04-29

## 2024-04-29 RX ORDER — AMOXICILLIN 250 MG
2 CAPSULE ORAL 2 TIMES DAILY PRN
Status: CANCELLED | OUTPATIENT
Start: 2024-04-29

## 2024-04-29 RX ORDER — DEXTROSE MONOHYDRATE 25 G/50ML
25-50 INJECTION, SOLUTION INTRAVENOUS
Status: CANCELLED | OUTPATIENT
Start: 2024-04-29

## 2024-04-29 RX ORDER — ISOSORBIDE MONONITRATE 30 MG/1
30 TABLET, EXTENDED RELEASE ORAL DAILY
Status: CANCELLED | OUTPATIENT
Start: 2024-04-29

## 2024-04-29 RX ORDER — SODIUM BICARBONATE 650 MG/1
1950 TABLET ORAL 3 TIMES DAILY
Qty: 180 TABLET | Refills: 1 | Status: SHIPPED | OUTPATIENT
Start: 2024-04-29

## 2024-04-29 RX ORDER — URSODIOL 300 MG/1
300 CAPSULE ORAL 2 TIMES DAILY
Status: CANCELLED | OUTPATIENT
Start: 2024-04-29

## 2024-04-29 RX ORDER — AMLODIPINE BESYLATE 5 MG/1
5 TABLET ORAL DAILY
Status: CANCELLED | OUTPATIENT
Start: 2024-04-29

## 2024-04-29 RX ORDER — DIPHENHYDRAMINE HCL 25 MG
25 CAPSULE ORAL EVERY 6 HOURS PRN
Status: CANCELLED | OUTPATIENT
Start: 2024-04-29

## 2024-04-29 RX ORDER — ONDANSETRON 2 MG/ML
4 INJECTION INTRAMUSCULAR; INTRAVENOUS EVERY 6 HOURS PRN
Status: CANCELLED | OUTPATIENT
Start: 2024-04-29

## 2024-04-29 RX ORDER — HEPARIN SODIUM 5000 [USP'U]/.5ML
5000 INJECTION, SOLUTION INTRAVENOUS; SUBCUTANEOUS EVERY 8 HOURS
Status: COMPLETED | OUTPATIENT
Start: 2024-04-29 | End: 2024-04-29

## 2024-04-29 RX ORDER — LORATADINE 10 MG/1
10 TABLET ORAL DAILY
Status: CANCELLED | OUTPATIENT
Start: 2024-04-29

## 2024-04-29 RX ORDER — LIDOCAINE 40 MG/G
CREAM TOPICAL
Status: CANCELLED | OUTPATIENT
Start: 2024-04-29

## 2024-04-29 RX ORDER — HYDROCORTISONE 2.5 %
CREAM (GRAM) TOPICAL 2 TIMES DAILY PRN
Status: CANCELLED | OUTPATIENT
Start: 2024-04-29

## 2024-04-29 RX ORDER — URSODIOL 300 MG/1
300 CAPSULE ORAL 2 TIMES DAILY
Status: DISCONTINUED | OUTPATIENT
Start: 2024-04-29 | End: 2024-04-30 | Stop reason: HOSPADM

## 2024-04-29 RX ORDER — EMOLLIENT BASE
CREAM (GRAM) TOPICAL 3 TIMES DAILY
Status: CANCELLED | OUTPATIENT
Start: 2024-04-29

## 2024-04-29 RX ORDER — SODIUM BICARBONATE 650 MG/1
1950 TABLET ORAL 3 TIMES DAILY
Status: CANCELLED | OUTPATIENT
Start: 2024-04-29

## 2024-04-29 RX ORDER — NICOTINE POLACRILEX 4 MG
15-30 LOZENGE BUCCAL
Status: CANCELLED | OUTPATIENT
Start: 2024-04-29

## 2024-04-29 RX ORDER — SODIUM BICARBONATE 650 MG/1
1950 TABLET ORAL 3 TIMES DAILY
Qty: 180 TABLET | Refills: 0 | Status: ON HOLD | OUTPATIENT
Start: 2024-04-29 | End: 2024-04-30

## 2024-04-29 RX ORDER — AMOXICILLIN 250 MG
1 CAPSULE ORAL 2 TIMES DAILY PRN
Status: CANCELLED | OUTPATIENT
Start: 2024-04-29

## 2024-04-29 RX ORDER — ONDANSETRON 4 MG/1
4 TABLET, ORALLY DISINTEGRATING ORAL EVERY 6 HOURS PRN
Status: CANCELLED | OUTPATIENT
Start: 2024-04-29

## 2024-04-29 RX ORDER — GUAIFENESIN/DEXTROMETHORPHAN 100-10MG/5
10 SYRUP ORAL EVERY 4 HOURS PRN
Status: CANCELLED | OUTPATIENT
Start: 2024-04-29

## 2024-04-29 RX ORDER — SODIUM BICARBONATE 650 MG/1
1950 TABLET ORAL 3 TIMES DAILY
Status: DISCONTINUED | OUTPATIENT
Start: 2024-04-29 | End: 2024-04-30 | Stop reason: HOSPADM

## 2024-04-29 RX ADMIN — SODIUM BICARBONATE 650 MG TABLET 1300 MG: at 08:14

## 2024-04-29 RX ADMIN — SODIUM BICARBONATE 650 MG TABLET 1950 MG: at 20:19

## 2024-04-29 RX ADMIN — ISOSORBIDE MONONITRATE 30 MG: 30 TABLET, EXTENDED RELEASE ORAL at 08:13

## 2024-04-29 RX ADMIN — SODIUM BICARBONATE 650 MG TABLET 1950 MG: at 14:25

## 2024-04-29 RX ADMIN — AMLODIPINE BESYLATE 5 MG: 5 TABLET ORAL at 08:12

## 2024-04-29 RX ADMIN — URSOSIOL 300 MG: 300 CAPSULE ORAL at 20:19

## 2024-04-29 RX ADMIN — HEPARIN SODIUM 5000 UNITS: 5000 INJECTION, SOLUTION INTRAVENOUS; SUBCUTANEOUS at 21:57

## 2024-04-29 RX ADMIN — INSULIN GLARGINE 6 UNITS: 100 INJECTION, SOLUTION SUBCUTANEOUS at 08:27

## 2024-04-29 RX ADMIN — URSOSIOL 300 MG: 300 CAPSULE ORAL at 14:24

## 2024-04-29 RX ADMIN — DIPHENHYDRAMINE HYDROCHLORIDE 25 MG: 25 CAPSULE ORAL at 01:40

## 2024-04-29 RX ADMIN — ASPIRIN 81 MG: 81 TABLET, COATED ORAL at 08:13

## 2024-04-29 RX ADMIN — LORATADINE 10 MG: 10 TABLET ORAL at 08:13

## 2024-04-29 RX ADMIN — HEPARIN SODIUM 5000 UNITS: 5000 INJECTION, SOLUTION INTRAVENOUS; SUBCUTANEOUS at 06:10

## 2024-04-29 RX ADMIN — HEPARIN SODIUM 5000 UNITS: 5000 INJECTION, SOLUTION INTRAVENOUS; SUBCUTANEOUS at 14:26

## 2024-04-29 ASSESSMENT — ACTIVITIES OF DAILY LIVING (ADL)
ADLS_ACUITY_SCORE: 25
ADLS_ACUITY_SCORE: 26
ADLS_ACUITY_SCORE: 25
ADLS_ACUITY_SCORE: 26
ADLS_ACUITY_SCORE: 25
ADLS_ACUITY_SCORE: 26
ADLS_ACUITY_SCORE: 26
ADLS_ACUITY_SCORE: 25
ADLS_ACUITY_SCORE: 25
ADLS_ACUITY_SCORE: 26
ADLS_ACUITY_SCORE: 26
ADLS_ACUITY_SCORE: 25
ADLS_ACUITY_SCORE: 26
ADLS_ACUITY_SCORE: 26
ADLS_ACUITY_SCORE: 25

## 2024-04-29 NOTE — PLAN OF CARE
Problem: Fall Injury Risk  Goal: Absence of Fall and Fall-Related Injury  Intervention: Identify and Manage Contributors  Recent Flowsheet Documentation  Taken 4/28/2024 1609 by jT Payne RN  Medication Review/Management: medications reviewed     Problem: Allergic/Anaphylactic Reaction  Goal: Resolution of Hypersensitivity Symptoms  Outcome: Progressing  Intervention: Manage Acute Allergic Reaction  Recent Flowsheet Documentation  Taken 4/28/2024 1609 by Tj Payne RN  Medication Review/Management: medications reviewed   Goal Outcome Evaluation:             Alert and oriented x4. Continues to report some itching, better after receiving benadryl. Tolerating low fiber diet. Compliant with call light. Ambulating with walker. Pleasant and cooperative.

## 2024-04-29 NOTE — PROGRESS NOTES
GASTROENTEROLOGY PROGRESS NOTE     SUBJECTIVE   The patient reports ongoing difficulties with pruritus, but no abdominal pain. No fevers.      OBJECTIVE     Vitals Blood pressure (!) 143/72, pulse 98, temperature 98.3  F (36.8  C), temperature source Oral, resp. rate 18, height 1.829 m (6'), weight 94 kg (207 lb 4.8 oz), SpO2 96%.          Physical Exam  General: awake, alert, responds appropriately   Cardiovascular: S1S2, no edema   Chest: lungs are clear    Abdomen: +bs, soft, very mild tenderness in right upper quadrant with deep palpation   Neurologic: grossly intact        LABORATORY    ELECTROLYTE PANEL   Recent Labs   Lab 04/29/24  0639 04/29/24  0625 04/28/24  2110 04/28/24  0638 04/28/24  0523 04/27/24  0655 04/27/24  0559   NA  --  139  --   --  138  --  138   POTASSIUM  --  4.2  --   --  4.0  --  4.1   CHLORIDE  --  109*  --   --  110*  --  109*   CO2  --  15*  --   --  16*  --  18*   * 213* 228*   < > 210*   < > 184*   CR  --  1.64*  --   --  1.58*  --  1.72*   BUN  --  26.2*  --   --  29.5*  --  35.2*    < > = values in this interval not displayed.      HEMATOLOGY PANEL   Recent Labs   Lab 04/28/24  0523 04/27/24  0559 04/26/24  0430   HGB 11.1* 10.3* 11.9*   MCV 87 86 87   WBC 9.3 7.8 11.4*    199 253   INR  --   --  1.11      LIVER AND PANCREAS PANEL   Recent Labs   Lab 04/29/24  0625 04/28/24  0523 04/27/24  0559 04/26/24  0430 04/25/24  2329   * 267* 268* 251* 250*   * 332* 301* 320* 330*   ALKPHOS 797* 751* 605* 589* 619*   BILITOTAL 8.5* 8.0* 7.1* 5.0* 4.9*   LIPASE  --   --  163* 444* 495*     IMAGING STUDIES    EXAM: MR ABDOMEN MRCP W/O and W CONTRAST  LOCATION: [facility]  DATE: 4/26/2024     INDICATION: Elevated LFTs.  COMPARISON: 04/26/2024 CT and ultrasound  TECHNIQUE: Routine MR liver/pancreas protocol including axial and coronal MRCP sequences. 2D and 3D reconstruction performed by MR technologist including MIP reconstruction and slab  cholangiograms. If performed with contrast, additional dynamic T1 post   IV contrast images.  CONTRAST: 9mL Gadavist      FINDINGS:      MRCP: The gallbladder is distended. There is intrahepatic bile duct dilatation. The common bile duct is dilated to the mid duct, where there is a 15 mm length of nonvisualization in the region of the pancreatic head. The distal common bile duct is small   in caliber. The pancreatic duct is normal in caliber. Multiple small cystic lesions measuring up to 9 mm are along the course of the pancreatic duct.     ADDITIONAL FINDINGS: No definite mass identified in the region of the pancreas. No lymphadenopathy. A few small simple left renal cysts do not require follow-up. There is atherosclerotic disease.                                                                         IMPRESSION:  1.  Bile ducts dilatation and gallbladder distention due to narrowing or occlusion of the common bile duct. The source of narrowing is not identified on this study, though a pancreatic mass is possible. ERCP should be considered if not already performed.  2.  Multiple small cystic lesions along the course of the pancreatic duct. 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 Recommend CT or MRI/MRCP in 6 months.      EXAM: CT ABDOMEN PELVIS W/O CONTRAST  LOCATION: [facility]  DATE: 4/26/2024     INDICATION: Elevated LFTs, lipase.  No significant acute findings found on ultrasound.  COMPARISON: 12/03/2021.  TECHNIQUE: CT scan of the abdomen and pelvis was performed without IV contrast. Multiplanar reformats were obtained. Dose reduction techniques were used.  CONTRAST: None.     FINDINGS:   LOWER CHEST: There is a 2.1 cm nodule or nodular infiltrate in the left lower lobe on the first image of this exam and not imaged in its entirety.     HEPATOBILIARY: The gallbladder is distended but otherwise appears normal. No calcified gallstone.     PANCREAS:  Normal.     SPLEEN: Normal.     ADRENAL GLANDS: Stable small left adrenal gland nodule.     KIDNEYS/BLADDER: No urinary stone or hydronephrosis. Cyst at the upper pole of the left kidney.     BOWEL: Colonic diverticula without acute diverticulitis. No bowel obstruction or inflammation. Moderate amount of stool in the colon. No free intraperitoneal gas or fluid.     LYMPH NODES: Normal.     VASCULATURE: Atherosclerotic calcification of the aorta and its branches. No aneurysm.     PELVIC ORGANS: Probable radiation seeds in the prostate gland.     MUSCULOSKELETAL: Anterior abdominal wall mesh. Small right inguinal hernia containing fat. Spinal stimulator implanted in the subcutaneous fat of the right buttock. Degenerative disease throughout spine.                                                                      IMPRESSION:   1.  Left lower lobe nodule or nodular infiltrate, incompletely imaged on this exam. Follow-up recommended.  2.  No other acute abnormality.  3.  Colonic diverticula without acute diverticulitis. No bowel obstruction or inflammation.     EXAM: US ABDOMEN LIMITED  LOCATION: [facility]  DATE: 4/26/2024     INDICATION: elevated lfts  COMPARISON: None.  TECHNIQUE: Limited abdominal ultrasound.     FINDINGS:     GALLBLADDER: Normal. No gallstones, wall thickening, or pericholecystic fluid. Negative sonographic Garcia's sign.     BILE DUCTS: There is intra and extrahepatic biliary dilatation. No cause of obstruction is seen. The common duct measures 11 mm.     LIVER: Fatty infiltration. No focal mass.     RIGHT KIDNEY: No hydronephrosis.     PANCREAS: The visualized portions are normal.     No ascites.                                                                      IMPRESSION:  1.  Biliary dilatation without cause of obstruction seen.  2.  Fatty infiltration of the liver.     I have reviewed the current diagnostic and laboratory tests.              IMPRESSION   Abnormal  liver tests and pruritus-- This 80 yo male presented with rash, jaundice, and RUQ pain. The patient had been on Keflex recently with rash and possible drug-induced liver injury considered. However, abd US and MRCP show biliary dilation with multiple small cystic lesions along the pancreatic duct. Findings raise concern for possible choledocholithiasis, stricture, or even pancreatic mass. Will plan to proceed with EUS and ERCP at [facility] 4/30/24 for further evaluation.            PLAN   No objection to soft diet. NPO at midnight.   EUS and ERCP 4/30/24 at [facility]-- the patient will need to arrive by 11:15 AM.  Follow LFTs.  Will start Ursodiol for pruritus.    Total time spent on thisencounter=35 minutes.           Marika Camacho PA-C  Thank you for the opportunity to participate in the care of this patient.   Please feel free to call me with any questions or concerns.  Phone number [phone].

## 2024-04-29 NOTE — PROGRESS NOTES
RENAL PROGRESS NOTE    REASON FOR CONSULT:     metabolic acidosis      PLAN:  NPO for ERCP at [facility] sodium bicarb 1950 TID for discharge  OK to resume ACE when needed, given RF is back to his baseline   BMP in  the a.m.  No renal changes today         ASSESSMENT/PLAN:  Acute on Chronic Kidney Dz (3):  Non-oliguric, likely pre-renal DEAN, reported very low intake and dehydration PTA with ongoing diuretic use, high bili may be contributing to some degree   Baseline sCR in the 1.3-1.6 2021-22, with progressive decline and variability 1.5-2.2 in 2023  sCr peaked 2.2 on admission, improved  to 1.6, his presumed baseline more recently (eGFR 30-40 range )  H/o intermittent microhematuria, harshal UA/micro   Historically modest proteinuria by dip, no recent quant, ck up/c, no h/o MDS, ck serum immunofix given persistent proteinuria (though likely d/t DMN, cannot r/o occult etiol, shu with h/o microhematuria)  CT A/P: no renal stones or hydro, cyst L     Acute on mild chronic Metabolic acidosis:  Lactate initially 3-->1.8 post IVF's  Advancing CKD, retained Hydrogen and reduced ammonia excretion-->worsening acidosis, with Normal K (ck Phos) , may also have mild hypercholremic acidosis with IVF's   Would RETIRE Metformin with potential to contribute to worsening acidosis with reduced RF  Cont oral bicarb and trend      Hypophos:  ? D/t RTA vs very low intake PTA , enc phos intake      DM (2);  With neuropathy (on Gabapentin, high dose, consider renal dosing),   likely nephropathy with proteinuria, on ACE  Sliding scale insulin per [facility]  PTA Metformin (with DEAN/Lactic acidosis, would retire given risk of exacerbating acidosis) and  Semaglutide     Elevated bilirubin:  MRCO sggestive of obstructive jaundice ? Choleduocholithiasis, stricute and possible panc head mass, planning ERCP next week , GI following      Anemia:  On oral iron PTA   Hgb 10-11, not yet needing BUFFY therapy      H/o BPH:  Has followed with  "urology, last seen 2023 (DR Scales) , prior urolift, flomax dc'd at that visit      Pulm nodule:  As per [facility], plans for outpt w/up, surveillance     SUBJECTIVE:  pt had an ok  night, \"I dont' know what the plan is\" re ERCP timing, discussed with RN who thinks pt will be tranferred to [facility] tomorrow and return after.  NO pain currently, no SOB or CP.  Having regular stools     OBJECTIVE:  Physical Exam   Temp: 98.3  F (36.8  C) Temp src: Oral BP: (!) 143/72 Pulse: 98   Resp: 18 SpO2: 96 % O2 Device: None (Room air)    Vitals:    24 0447 24 0300 24 0017   Weight: 96.1 kg (211 lb 12.8 oz) 96.3 kg (212 lb 6.4 oz) 94 kg (207 lb 4.8 oz)     Vital Signs with Ranges  Temp:  [97.7  F (36.5  C)-98.3  F (36.8  C)] 98.3  F (36.8  C)  Pulse:  [] 98  Resp:  [16-18] 18  BP: (130-149)/(66-77) 143/72  SpO2:  [95 %-96 %] 96 %  I/O last 3 completed shifts:  In: 1240 [P.O.:1240]  Out: 100 [Urine:100]    Temp (24hrs), Av.9  F (36.6  C), Min:97.7  F (36.5  C), Max:98.3  F (36.8  C)      Patient Vitals for the past 72 hrs:   Weight   24 0017 94 kg (207 lb 4.8 oz)   24 0300 96.3 kg (212 lb 6.4 oz)   24 0447 96.1 kg (211 lb 12.8 oz)   24 1300 95.6 kg (210 lb 12.2 oz)     Intake/Output Summary (Last 24 hours) at 2024 0936  Last data filed at 2024 0700  Gross per 24 hour   Intake 1240 ml   Output 100 ml   Net 1140 ml       PHYSICAL EXAM:  General - Alert and oriented x3, appears comfortable, NAD  Cardiovascular - Rate and sBP conrrolled  Respiratory - on RA   Abd: BS present, no guarding or pain with palpation, no ascites  Extremities - No obvious lower extremity edema bilaterally  Skin: dry, intact, no rash, good turgor  Neuro:  Grossly intact, no focal deficits  MSK:  Grossly intact  Psych: flat ffect  :  not all UO doc   Net pos by doc d/t missed outpt recording, wt is stable     LABORATORY STUDIES:     Recent Labs   Lab 24  0523 24  0559 24  0430 " 04/25/24 2329   WBC 9.3 7.8 11.4* 10.6   RBC 3.83* 3.57* 4.10* 4.42   HGB 11.1* 10.3* 11.9* 12.7*   HCT 33.2* 30.7* 35.5* 38.2*    199 253 252       Basic Metabolic Panel:  Recent Labs   Lab 04/29/24  0639 04/29/24  0625 04/28/24  2110 04/28/24  1707 04/28/24  1151 04/28/24  0638 04/28/24  0523 04/27/24  0655 04/27/24  0559 04/26/24  1057 04/26/24  0852 04/26/24  0439 04/26/24 0430 04/25/24 2329   NA  --  139  --   --   --   --  138  --  138  --  136  --  138 132*   POTASSIUM  --  4.2  --   --   --   --  4.0  --  4.1  --  3.8  --  3.8 4.4   CHLORIDE  --  109*  --   --   --   --  110*  --  109*  --  109*  --  111* 104   CO2  --  15*  --   --   --   --  16*  --  18*  --  16*  --  14* 15*   BUN  --  26.2*  --   --   --   --  29.5*  --  35.2*  --  50.8*  --  51.7* 56.3*   CR  --  1.64*  --   --   --   --  1.58*  --  1.72*  --  1.93*  --  2.05* 2.17*   * 213* 228* 243* 310* 213* 210*   < > 184*   < > 317*   < > 197* 482*   SHANNAN  --  9.5  --   --   --   --  9.5  --  9.1  --  8.6*  --  9.0 9.4    < > = values in this interval not displayed.       INR  Recent Labs   Lab 04/26/24 0430   INR 1.11        Recent Labs   Lab Test 04/28/24  0523 04/27/24  0559 04/26/24  0430 06/16/22  0934 12/25/21 2000   INR  --   --  1.11  --  1.17*   WBC 9.3 7.8 11.4*   < > 18.6*   HGB 11.1* 10.3* 11.9*   < > 12.7*    199 253   < > 379    < > = values in this interval not displayed.       Personally reviewed current labs      KAREN Mullins-BC  [facility]  [phone]

## 2024-04-29 NOTE — PROGRESS NOTES
[facility] MEDICINE  PROGRESS NOTE       Securely message me with Fiordaliza (more info)    Code Status: Full Code       Identification/Summary:   Lance Ibrahim is a 79 year old male with PMH of CAD, CHF, stroke, bph, htn, obesity, hyperlipidemia, diabetes who presented with complaints of rash, jaundice.  His rash and liver abnormality likely related to Keflex allergy.  Symptomatic treatment are started here.  GI was consulted and recommended MRCP, which showed common bile duct dilation without finding of stenosis point.  He was also found to have metabolic acidosis with lactic elevation and mild leukocytosis on presentation.  GI recommended ERCP, possible to Tuesday at [facility].  His acidosis persists so nephrology was consulted.  His glucose continues to uptrend while metformin and semaglutide are close to contraindicated so he requires insulin. While learning to inject insulin and working on metabolic acidosis, he has ERCP scheduled tomorrow at [facility] likely day trip.     Barriers to Discharge: metabolic acidosis, starting insulin, ERCP tomorrow     Disposition: Inpatient     Assessment and Plan:  Rash  Hyperbilirubinemia with direct dominance  4/3 started Keflex  4/7 rash started  4/17 PCP started prednisone package  4/20 went to ED and got Benadryl  4/26 presented to ED with persistent rash and juandice  There is also elevated lipase.  There etiology of urinary tract obstruction cannot be ruled out.  Acute hepatitis panel pending.  Acetaminophen level less than detectable volume.  Denies bug bites no history of travel.  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 the earliest time it could happen is to stay at [facility]. NPO at midnight on 4/29. Transfer on 4/30.  - will hold off prednisone  - lotion for itching, diphenhydramine prn, hydrocortisone gel prn  - hold  statin  -Was unable to get help dermatology on 4/26     Metabolic acidosis  Did have lactic acidosis which has resolved on admission.  Now symptoms other etiologies are ruled out and RTA is possible.  -Metformin needs to retire and I believe semaglutide too  -Other possibility is underlying infection, possible sources include his skin (checked skin thoroughly on 4/29, no concern for cellulitis), spinal stimulator site(Without symptoms, this is unlikely).  -Nephrology consulted on 4/28, bicarb tablet is started     Pain with spinal stimulator  It was implanted on 4/3/2024 at [facility] pain clinic with Dr. Terence Nash.  His antibiotic course was aborted because of the rash by himself.  He denies symptoms at the spot of implantation.  Per CT on admission, spinal stimulator implanted in the subcutaneous fat of the right buttock.   -Reached out to Dr. Martin, no more indication for abx     Lactic acidosis -resolved  DEAN versus CKD-improving  Creatinine is 2.0 higher than the previous one but comparable to historic data  pH 7.28, lactic acid 3 > 1.8 with IVF. No anion gap though. Other causes include RTA, DKA with semaglutide (negative beta hydroxybutyric acid)  - Continue IV fluid and monitor, IV fluid discontinued  - Monitor BMP  - hold torsemide for now     Leukocytosis  Certainly there is risk of infection given scratching and a few open spots on his rash.  Could also be from prednisone use or hepatitis or rash itself.  No fever.  -Follow up on blood culture  -Low threshold to start antibiotics  -Downtrended now after prednisone was stopped     Dyspnea  Reported dyspnea on exertion for the past few days that he attributes to lack of sleep  Close monitoring while on IV fluid, stopped     DM neuropathy  Chronic pain  Stimulator placed early this month  He stopped his Keflex when he had the rash.  Will have him contact his clinic for more guidance on antibiotics  Glucose continues to uptrend to a point that he  requires insulin now that metformin and semaglutide are contraindicated due to CKD     DM2  Sliding scale insulin prn, added meal dose 3u> 0u>2u tid with meals>0, started long acting 6u and just SSI  Typically on metformin hold, semaglutide hold  -Diabetic education with RN     Incidental pulmonary nodule/infiltration  Will need outpt surveillance         Anticoagulation   Orders (Includes Only Anticoagulants)  heparin ANTICOAGULANT, 5,000 Units, Subcutaneous, Q8H      Therapy: PT  Barnes:Not present  Lines: None       Current Diet  Orders Placed This Encounter      Combination Diet Low Fiber; Moderate Consistent Carb (60 g CHO per Meal) Diet        Clinically Significant Risk Factors              # Hypoalbuminemia: Lowest albumin = 3.3 g/dL at 4/28/2024  5:23 AM, will monitor as appropriate     # Hypertension: Noted on problem list       # DMII: A1C = 8.0 % (Ref range: 0.0 - 5.6 %) within past 6 months, PRESENT ON ADMISSION  # Overweight: Estimated body mass index is 28.11 kg/m  as calculated from the following:    Height as of this encounter: 1.829 m (6').    Weight as of this encounter: 94 kg (207 lb 4.8 oz)., PRESENT ON ADMISSION  # Moderate Malnutrition: based on nutrition assessment, PRESENT ON ADMISSION          Interval History/Subjective:  He still has itching and mild abd pain on the RUQ.       Last 24H PRN:     diphenhydrAMINE (BENADRYL) capsule 25 mg, 25 mg at 04/29/24 0140 **OR** diphenhydrAMINE (BENADRYL) injection 25 mg, 25 mg at 04/26/24 0844    Physical Exam/Objective:  Temp:  [97.7  F (36.5  C)-98.3  F (36.8  C)] 98.3  F (36.8  C)  Pulse:  [] 98  Resp:  [16-18] 18  BP: (130-149)/(66-77) 143/72  SpO2:  [95 %-96 %] 96 %  Wt Readings from Last 4 Encounters:   04/29/24 94 kg (207 lb 4.8 oz)   04/17/24 100.7 kg (222 lb)   03/27/24 104.8 kg (231 lb)   02/27/24 108.4 kg (239 lb)     Body mass index is 28.11 kg/m .    General Appearance: Alert and wake, not in distress  GI: soft, Garcia's sign+,  normal bowel sound  Neurology: oriented x 3  Psych: cooperative and calm, normal affect  Skin:Vesicular papular rash diffusely on his torso and extremities.  No lesion on the scalp or oral cavity.  Many vesicles have ruptured and dried up.  2 spots of scratched lesion look like healed small ulcers. No excessive erythema, swelling or discharge to suggest cellulitis    Medications:   Personally Reviewed.  Medications   Current Facility-Administered Medications   Medication Dose Route Frequency Provider Last Rate Last Admin     Current Facility-Administered Medications   Medication Dose Route Frequency Provider Last Rate Last Admin    amLODIPine (NORVASC) tablet 5 mg  5 mg Oral Daily Candie Goss MD   5 mg at 04/29/24 0812    aspirin EC tablet 81 mg  81 mg Oral Daily Candie Goss MD   81 mg at 04/29/24 0813    [Held by provider] atorvastatin (LIPITOR) tablet 40 mg  40 mg Oral Daily Candie Goss MD        emollient (VANICREAM) cream   Topical TID Candie Goss MD   Given at 04/28/24 2141    heparin ANTICOAGULANT injection 5,000 Units  5,000 Units Subcutaneous Q8H Candie Goss MD   5,000 Units at 04/29/24 0610    insulin aspart (NovoLOG) injection (RAPID ACTING)  1-3 Units Subcutaneous TID AC Candie Goss MD   2 Units at 04/29/24 1219    insulin aspart (NovoLOG) injection (RAPID ACTING)  1-3 Units Subcutaneous At Bedtime Candie Goss MD   1 Units at 04/28/24 2140    insulin glargine (LANTUS PEN) injection 6 Units  6 Units Subcutaneous QAM AC Candie Goss MD   6 Units at 04/29/24 0827    isosorbide mononitrate (IMDUR) 24 hr tablet 30 mg  30 mg Oral Daily Candie Goss MD   30 mg at 04/29/24 0813    [Held by provider] lisinopril (ZESTRIL) tablet 10 mg  10 mg Oral Q24H Candie Goss MD        loratadine (CLARITIN) tablet 10 mg  10 mg Oral Daily Candie Goss MD   10 mg at 04/29/24 0813    [Held by provider] metFORMIN (GLUCOPHAGE XR) 24 hr tablet 500 mg  500 mg Oral BID w/meals Candie Goss MD        sodium  bicarbonate tablet 1,950 mg  1,950 mg Oral TID Di Diego, NP        sodium chloride (PF) 0.9% PF flush 3 mL  3 mL Intracatheter Q8H Rickey Hinton DO   3 mL at 04/29/24 0401    [Held by provider] torsemide (DEMADEX) tablet 20 mg  20 mg Oral Daily Candie Goss MD           Data reviewed today: I personally reviewed all new medications, labs, imaging/diagnostics reports over the past 24 hours. Pertinent findings include:    Imaging:   No results found for this or any previous visit (from the past 24 hour(s)).    Labs:  MR Abdomen MRCP w/o & w Contrast   Final Result   IMPRESSION:   1.  Bile ducts dilatation and gallbladder distention due to narrowing or occlusion of the common bile duct. The source of narrowing is not identified on this study, though a pancreatic mass is possible. ERCP should be considered if not already performed.   2.  Multiple small cystic lesions along the course of the pancreatic duct. 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 Recommend CT or MRI/MRCP in 6 months.      REFERENCE:   Revisions of international consensus [place] guidelines for the management of IPMN of the pancreas. Pancreatology 2017;17(5):738-753.      Largest cyst less than 10 mm: CT or MRI/MRCP in 6 months and then every 2 years if no change.         CT Abdomen Pelvis w/o Contrast   Final Result   IMPRESSION:    1.  Left lower lobe nodule or nodular infiltrate, incompletely imaged on this exam. Follow-up recommended.   2.  No other acute abnormality.   3.  Colonic diverticula without acute diverticulitis. No bowel obstruction or inflammation.         US Abdomen Limited   Final Result   IMPRESSION:   1.  Biliary dilatation without cause of obstruction seen.   2.  Fatty infiltration of the liver.              Recent Results (from the past 24 hour(s))   Glucose by meter    Collection Time: 04/28/24  5:07 PM   Result Value Ref Range    GLUCOSE BY METER POCT 243 (H) 70 - 99 mg/dL   Glucose by  meter    Collection Time: 04/28/24  9:10 PM   Result Value Ref Range    GLUCOSE BY METER POCT 228 (H) 70 - 99 mg/dL   Comprehensive metabolic panel    Collection Time: 04/29/24  6:25 AM   Result Value Ref Range    Sodium 139 135 - 145 mmol/L    Potassium 4.2 3.4 - 5.3 mmol/L    Carbon Dioxide (CO2) 15 (L) 22 - 29 mmol/L    Anion Gap 15 7 - 15 mmol/L    Urea Nitrogen 26.2 (H) 8.0 - 23.0 mg/dL    Creatinine 1.64 (H) 0.67 - 1.17 mg/dL    GFR Estimate 42 (L) >60 mL/min/1.73m2    Calcium 9.5 8.8 - 10.2 mg/dL    Chloride 109 (H) 98 - 107 mmol/L    Glucose 213 (H) 70 - 99 mg/dL    Alkaline Phosphatase 797 (H) 40 - 150 U/L     (H) 0 - 45 U/L     (H) 0 - 70 U/L    Protein Total 6.7 6.4 - 8.3 g/dL    Albumin 3.4 (L) 3.5 - 5.2 g/dL    Bilirubin Total 8.5 (H) <=1.2 mg/dL   Extra Purple Top Tube    Collection Time: 04/29/24  6:25 AM   Result Value Ref Range    Hold Specimen JI    Glucose by meter    Collection Time: 04/29/24  6:39 AM   Result Value Ref Range    GLUCOSE BY METER POCT 216 (H) 70 - 99 mg/dL   Glucose by meter    Collection Time: 04/29/24 12:05 PM   Result Value Ref Range    GLUCOSE BY METER POCT 279 (H) 70 - 99 mg/dL       Pending Labs:  Unresulted Labs Ordered in the Past 30 Days of this Admission       Date and Time Order Name Status Description    4/28/2024  8:50 AM Protein Immunofixation Serum In process     4/26/2024  8:42 AM Blood Culture Peripheral Blood Preliminary     4/26/2024  8:42 AM Blood Culture Peripheral Blood Preliminary           I spoke with Dr. Martin at [facility] for his care.    DERICK MARTINS MD  [facility]  Phone: #[phone]    Securely message me with Fiord[employer] (more info)

## 2024-04-29 NOTE — PROGRESS NOTES
04/29/24 1010   Appointment Info   Signing Clinician's Name / Credentials (PT) Brittany Santana, PT, DPT   Living Environment   People in Home alone   Current Living Arrangements apartment   Home Accessibility no concerns   Self-Care   Equipment Currently Used at Home walker, rolling   General Information   Onset of Illness/Injury or Date of Surgery 04/25/24   Referring Physician Candie Goss MD   Patient/Family Therapy Goals Statement (PT) to get better   Pertinent History of Current Problem (include personal factors and/or comorbidities that impact the POC) Hyperglycemia   Existing Precautions/Restrictions no known precautions/restrictions   Weight-Bearing Status - LLE full weight-bearing   Weight-Bearing Status - RLE full weight-bearing   Bed Mobility   Bed Mobility supine-sit;sit-supine   Supine-Sit [place] (Bed Mobility) independent   Sit-Supine [place] (Bed Mobility) independent   Comment, (Bed Mobility) Independent with supine<>sit transfers.   Transfers   Transfers sit-stand transfer   Comment, (Transfers) Independent with 4WW for sit<>Stand transfers.   Sit-Stand Transfer   Sit-Stand [place] (Transfers) independent;verbal cues   Assistive Device (Sit-Stand Transfers) walker, 4-wheeled   Comment, (Sit-Stand Transfer) Independent with sit<>stand transfers with 4WW. Verbal cues for safety.   Gait/Stairs (Locomotion)   [place] Level (Gait) supervision;verbal cues   Assistive Device (Gait) walker, 4-wheeled   Distance in Feet (Gait) 225'   Pattern (Gait) step-through   Deviations/Abnormal Patterns (Gait) base of support, wide;rainer decreased;gait speed decreased   Maintains Weight-bearing Status (Gait) able to maintain   Comment, (Gait/Stairs) SBA with 4WW for sit<>Stand transfers. Verbal cues for safety and posture. Pt reports diabetic neuropathy and feels like he is walking on [employer]. Pt reports has been ambulating with 4WW for a few years. Pt reports no concerns with mobility.    Clinical Impression   Criteria for Skilled Therapeutic Intervention Evaluation only   Clinical Presentation (PT Evaluation Complexity) stable   Clinical Presentation Rationale Pt presents as medically diagnosed   Clinical Decision Making (Complexity) low complexity   Risk & Benefits of therapy have been explained evaluation/treatment results reviewed;care plan/treatment goals reviewed;patient   PT Total Evaluation Time   PT [place], Low Complexity Minutes (19564) 15   PT Discharge Planning   PT Plan discharge PT   PT Discharge Recommendation (DC Rec) (S)  home   PT Rationale for DC Rec Pt reports good home setup and good home support. Independent with mobility. Pt has no mobility concerns   PT Brief overview of current status Independent wtih transfers and SBA wtih 225' with 4WW   Total Session Time   Total Session Time (sum of timed and untimed services) 15       Brittany Santana, PT, DPT

## 2024-04-29 NOTE — PLAN OF CARE
Pt A&Ox4 and VSS expect for hypertension. BP medication given. Pt c/o of itching, Ursodiol given for itching. Pt refused benadryl. Basic insulin education was given more education needed. Pt independent. Pt going to [facility] tomorrow for ERCP test. IV saline locked. Pt makes needs known and call light within reach. Denies pain and denies N/V. Pending discharge.    Problem: Allergic/Anaphylactic Reaction  Goal: Resolution of Hypersensitivity Symptoms  Outcome: Progressing   Goal Outcome Evaluation:

## 2024-04-29 NOTE — PLAN OF CARE
Goal Outcome Evaluation:      Problem: Allergic/Anaphylactic Reaction  Goal: Resolution of Hypersensitivity Symptoms  Outcome: Progressing     Problem: Fall Injury Risk  Goal: Absence of Fall and Fall-Related Injury  Outcome: Progressing  Intervention: Identify and Manage Contributors  Recent Flowsheet Documentation  Taken 4/29/2024 0017 by Odin Muhammad RN  Medication Review/Management: medications reviewed  Intervention: Promote Injury-Free Environment  Recent Flowsheet Documentation  Taken 4/29/2024 0253 by Odin Muhammad RN  Safety Promotion/Fall Prevention: safety round/check completed  Taken 4/29/2024 0017 by Odin Muhammad RN  Safety Promotion/Fall Prevention:   safety round/check completed   room door open   nonskid shoes/slippers when out of bed   mobility aid in reach   lighting adjusted   clutter free environment maintained     Patient alert and oriented x 4, speech clear. Denies pain. No SOB. Patient has a rash which appears like dry scabs and some open on entire body. Patient given Benadryl prn. Patient requested a shower to decrease the itchy feeling. Shower given.  PIV SL and flushed and capped. SBA 1 with rolling seated walker gait steady. Trace edema of LE.  Voiding without difficulty.

## 2024-04-30 ENCOUNTER — ANESTHESIA (OUTPATIENT)
Dept: SURGERY | Facility: HOSPITAL | Age: 79
End: 2024-04-30
Payer: COMMERCIAL

## 2024-04-30 ENCOUNTER — APPOINTMENT (OUTPATIENT)
Dept: RADIOLOGY | Facility: HOSPITAL | Age: 79
End: 2024-04-30
Attending: INTERNAL MEDICINE
Payer: COMMERCIAL

## 2024-04-30 ENCOUNTER — HOSPITAL ENCOUNTER (INPATIENT)
Facility: CLINIC | Age: 79
LOS: 2 days | Discharge: HOME | DRG: 445 | End: 2024-05-02
Attending: INTERNAL MEDICINE | Admitting: INTERNAL MEDICINE
Payer: COMMERCIAL

## 2024-04-30 ENCOUNTER — HOSPITAL ENCOUNTER (OUTPATIENT)
Facility: HOSPITAL | Age: 79
Discharge: HOME | End: 2024-04-30
Attending: INTERNAL MEDICINE | Admitting: INTERNAL MEDICINE
Payer: COMMERCIAL

## 2024-04-30 VITALS
DIASTOLIC BLOOD PRESSURE: 66 MMHG | TEMPERATURE: 98.3 F | HEART RATE: 94 BPM | RESPIRATION RATE: 16 BRPM | SYSTOLIC BLOOD PRESSURE: 136 MMHG | BODY MASS INDEX: 27.96 KG/M2 | HEIGHT: 72 IN | OXYGEN SATURATION: 97 % | WEIGHT: 206.4 LBS

## 2024-04-30 VITALS
DIASTOLIC BLOOD PRESSURE: 74 MMHG | HEART RATE: 91 BPM | RESPIRATION RATE: 14 BRPM | OXYGEN SATURATION: 94 % | WEIGHT: 206 LBS | HEIGHT: 72 IN | BODY MASS INDEX: 27.9 KG/M2 | TEMPERATURE: 98.4 F | SYSTOLIC BLOOD PRESSURE: 152 MMHG

## 2024-04-30 DIAGNOSIS — K83.1 BILIARY STRICTURE (H): Primary | ICD-10-CM

## 2024-04-30 DIAGNOSIS — I25.10 CORONARY ARTERY DISEASE INVOLVING NATIVE CORONARY ARTERY OF NATIVE HEART WITHOUT ANGINA PECTORIS: ICD-10-CM

## 2024-04-30 DIAGNOSIS — E11.41 DIABETIC MONONEUROPATHY ASSOCIATED WITH TYPE 2 DIABETES MELLITUS (H): ICD-10-CM

## 2024-04-30 PROBLEM — R10.9 ABDOMINAL PAIN: Status: ACTIVE | Noted: 2024-04-30

## 2024-04-30 LAB
ALBUMIN SERPL BCG-MCNC: 3 G/DL (ref 3.5–5.2)
ALBUMIN SERPL BCG-MCNC: 3.2 G/DL (ref 3.5–5.2)
ALP SERPL-CCNC: 780 U/L (ref 40–150)
ALP SERPL-CCNC: 783 U/L (ref 40–150)
ALT SERPL W P-5'-P-CCNC: 257 U/L (ref 0–70)
ALT SERPL W P-5'-P-CCNC: 277 U/L (ref 0–70)
ANION GAP SERPL CALCULATED.3IONS-SCNC: 13 MMOL/L (ref 7–15)
ANION GAP SERPL CALCULATED.3IONS-SCNC: 18 MMOL/L (ref 7–15)
AST SERPL W P-5'-P-CCNC: 178 U/L (ref 0–45)
AST SERPL W P-5'-P-CCNC: 196 U/L (ref 0–45)
BASE EXCESS BLDV CALC-SCNC: -3 MMOL/L (ref -3–3)
BASOPHILS # BLD AUTO: 0 10E3/UL (ref 0–0.2)
BASOPHILS NFR BLD AUTO: 0 %
BILIRUB SERPL-MCNC: 6.5 MG/DL
BILIRUB SERPL-MCNC: 7.7 MG/DL
BUN SERPL-MCNC: 25.3 MG/DL (ref 8–23)
BUN SERPL-MCNC: 27.9 MG/DL (ref 8–23)
CALCIUM SERPL-MCNC: 8.6 MG/DL (ref 8.8–10.2)
CALCIUM SERPL-MCNC: 9.4 MG/DL (ref 8.8–10.2)
CHLORIDE SERPL-SCNC: 100 MMOL/L (ref 98–107)
CHLORIDE SERPL-SCNC: 107 MMOL/L (ref 98–107)
CREAT SERPL-MCNC: 1.56 MG/DL (ref 0.67–1.17)
CREAT SERPL-MCNC: 1.59 MG/DL (ref 0.67–1.17)
DEPRECATED HCO3 PLAS-SCNC: 17 MMOL/L (ref 22–29)
DEPRECATED HCO3 PLAS-SCNC: 20 MMOL/L (ref 22–29)
EGFRCR SERPLBLD CKD-EPI 2021: 44 ML/MIN/1.73M2
EGFRCR SERPLBLD CKD-EPI 2021: 45 ML/MIN/1.73M2
EOSINOPHIL # BLD AUTO: 0 10E3/UL (ref 0–0.7)
EOSINOPHIL NFR BLD AUTO: 0 %
ERCP: NORMAL
ERYTHROCYTE [DISTWIDTH] IN BLOOD BY AUTOMATED COUNT: 19.9 % (ref 10–15)
GLUCOSE BLDC GLUCOMTR-MCNC: 172 MG/DL (ref 70–99)
GLUCOSE BLDC GLUCOMTR-MCNC: 187 MG/DL (ref 70–99)
GLUCOSE BLDC GLUCOMTR-MCNC: 198 MG/DL (ref 70–99)
GLUCOSE BLDC GLUCOMTR-MCNC: 214 MG/DL (ref 70–99)
GLUCOSE BLDC GLUCOMTR-MCNC: 281 MG/DL (ref 70–99)
GLUCOSE BLDC GLUCOMTR-MCNC: 361 MG/DL (ref 70–99)
GLUCOSE SERPL-MCNC: 199 MG/DL (ref 70–99)
GLUCOSE SERPL-MCNC: 369 MG/DL (ref 70–99)
HCO3 BLDV-SCNC: 23 MMOL/L (ref 21–28)
HCT VFR BLD AUTO: 31.7 % (ref 40–53)
HGB BLD-MCNC: 10.5 G/DL (ref 13.3–17.7)
HOLD SPECIMEN: NORMAL
IMM GRANULOCYTES # BLD: 0.2 10E3/UL
IMM GRANULOCYTES NFR BLD: 2 %
LIPASE SERPL-CCNC: 100 U/L (ref 13–60)
LYMPHOCYTES # BLD AUTO: 1 10E3/UL (ref 0.8–5.3)
LYMPHOCYTES NFR BLD AUTO: 10 %
MAGNESIUM SERPL-MCNC: 1.8 MG/DL (ref 1.7–2.3)
MCH RBC QN AUTO: 28.8 PG (ref 26.5–33)
MCHC RBC AUTO-ENTMCNC: 33.1 G/DL (ref 31.5–36.5)
MCV RBC AUTO: 87 FL (ref 78–100)
MONOCYTES # BLD AUTO: 0.2 10E3/UL (ref 0–1.3)
MONOCYTES NFR BLD AUTO: 2 %
NEUTROPHILS # BLD AUTO: 8.6 10E3/UL (ref 1.6–8.3)
NEUTROPHILS NFR BLD AUTO: 85 %
NRBC # BLD AUTO: 0 10E3/UL
NRBC BLD AUTO-RTO: 0 /100
O2/TOTAL GAS SETTING VFR VENT: 0 %
OXYHGB MFR BLDV: 36 % (ref 70–75)
PCO2 BLDV: 41 MM HG (ref 40–50)
PH BLDV: 7.35 [PH] (ref 7.32–7.43)
PLATELET # BLD AUTO: 204 10E3/UL (ref 150–450)
PO2 BLDV: 23 MM HG (ref 25–47)
POTASSIUM SERPL-SCNC: 3.5 MMOL/L (ref 3.4–5.3)
POTASSIUM SERPL-SCNC: 3.8 MMOL/L (ref 3.4–5.3)
PROT SERPL-MCNC: 6.4 G/DL (ref 6.4–8.3)
PROT SERPL-MCNC: 6.4 G/DL (ref 6.4–8.3)
RBC # BLD AUTO: 3.65 10E6/UL (ref 4.4–5.9)
SAO2 % BLDV: 36.8 % (ref 70–75)
SODIUM SERPL-SCNC: 135 MMOL/L (ref 135–145)
SODIUM SERPL-SCNC: 140 MMOL/L (ref 135–145)
UPPER EUS: NORMAL
WBC # BLD AUTO: 10.1 10E3/UL (ref 4–11)

## 2024-04-30 PROCEDURE — 120N000001 HC R&B MED SURG/OB

## 2024-04-30 PROCEDURE — 0F798DZ DILATION OF COMMON BILE DUCT WITH INTRALUMINAL DEVICE, VIA NATURAL OR ARTIFICIAL OPENING ENDOSCOPIC: ICD-10-PCS | Performed by: INTERNAL MEDICINE

## 2024-04-30 PROCEDURE — C2625 STENT, NON-COR, TEM W/DEL SY: HCPCS | Performed by: INTERNAL MEDICINE

## 2024-04-30 PROCEDURE — 999N000180 XR SURGERY CARM FLUORO LESS THAN 5 MIN

## 2024-04-30 PROCEDURE — 83690 ASSAY OF LIPASE: CPT | Performed by: INTERNAL MEDICINE

## 2024-04-30 PROCEDURE — 36415 COLL VENOUS BLD VENIPUNCTURE: CPT | Performed by: STUDENT IN AN ORGANIZED HEALTH CARE EDUCATION/TRAINING PROGRAM

## 2024-04-30 PROCEDURE — 0FB98ZX EXCISION OF COMMON BILE DUCT, VIA NATURAL OR ARTIFICIAL OPENING ENDOSCOPIC, DIAGNOSTIC: ICD-10-PCS | Performed by: INTERNAL MEDICINE

## 2024-04-30 PROCEDURE — 710N000009 HC RECOVERY PHASE 1, LEVEL 1, PER MIN: Performed by: INTERNAL MEDICINE

## 2024-04-30 PROCEDURE — 370N000020 HC INTERCOMPANY SERVICE, ANESTHESIA 370 OPNP: Performed by: NURSE ANESTHETIST, CERTIFIED REGISTERED

## 2024-04-30 PROCEDURE — 88305 TISSUE EXAM BY PATHOLOGIST: CPT | Mod: 26 | Performed by: PATHOLOGY

## 2024-04-30 PROCEDURE — 250N000013 HC RX MED GY IP 250 OP 250 PS 637: Performed by: NURSE PRACTITIONER

## 2024-04-30 PROCEDURE — 710N000013 HC INTERCOMPANY SERVICE, RECOVERY 710 OPNP: Performed by: NURSE ANESTHETIST, CERTIFIED REGISTERED

## 2024-04-30 PROCEDURE — 88305 TISSUE EXAM BY PATHOLOGIST: CPT | Mod: TC | Performed by: INTERNAL MEDICINE

## 2024-04-30 PROCEDURE — 272N000006 HC INTERCOMPANY SERVICE, SUPPLY 272 OPNP

## 2024-04-30 PROCEDURE — 310N000078 HC INTERCOMPANY SERVICE, LAB PATH 310 OPNP: Performed by: NURSE ANESTHETIST, CERTIFIED REGISTERED

## 2024-04-30 PROCEDURE — 370N000017 HC ANESTHESIA TECHNICAL FEE, PER MIN: Performed by: INTERNAL MEDICINE

## 2024-04-30 PROCEDURE — 88112 CYTOPATH CELL ENHANCE TECH: CPT | Mod: TC | Performed by: INTERNAL MEDICINE

## 2024-04-30 PROCEDURE — 250N000009 HC RX 250: Performed by: NURSE ANESTHETIST, CERTIFIED REGISTERED

## 2024-04-30 PROCEDURE — 258N000003 HC RX IP 258 OP 636: Performed by: NURSE ANESTHETIST, CERTIFIED REGISTERED

## 2024-04-30 PROCEDURE — 272N000001 HC OR GENERAL SUPPLY STERILE: Performed by: INTERNAL MEDICINE

## 2024-04-30 PROCEDURE — 360N000083 HC SURGERY LEVEL 3 W/ FLUORO, PER MIN: Performed by: INTERNAL MEDICINE

## 2024-04-30 PROCEDURE — 360N000001 HC INTERCOMPANY SERVICE, AMB SURG 360 OPNP

## 2024-04-30 PROCEDURE — 636N000001 HC INTERCOMPANY SERVICE, DRUGS DETAILED 636 OPNP

## 2024-04-30 PROCEDURE — 360N000006 HC INTERCOMPANY SERVICE, OPERATIVE 360 OPNP: Performed by: NURSE ANESTHETIST, CERTIFIED REGISTERED

## 2024-04-30 PROCEDURE — 85025 COMPLETE CBC W/AUTO DIFF WBC: CPT | Performed by: INTERNAL MEDICINE

## 2024-04-30 PROCEDURE — 250N000013 HC RX MED GY IP 250 OP 250 PS 637: Performed by: STUDENT IN AN ORGANIZED HEALTH CARE EDUCATION/TRAINING PROGRAM

## 2024-04-30 PROCEDURE — 88112 CYTOPATH CELL ENHANCE TECH: CPT | Mod: 26 | Performed by: PATHOLOGY

## 2024-04-30 PROCEDURE — 710N000013 HC INTERCOMPANY SERVICE, RECOVERY 710 OPNP

## 2024-04-30 PROCEDURE — 370N000020 HC INTERCOMPANY SERVICE, ANESTHESIA 370 OPNP

## 2024-04-30 PROCEDURE — C1769 GUIDE WIRE: HCPCS | Performed by: INTERNAL MEDICINE

## 2024-04-30 PROCEDURE — 82962 GLUCOSE BLOOD TEST: CPT

## 2024-04-30 PROCEDURE — 250N000025 HC SEVOFLURANE, PER MIN: Performed by: INTERNAL MEDICINE

## 2024-04-30 PROCEDURE — 83735 ASSAY OF MAGNESIUM: CPT | Performed by: INTERNAL MEDICINE

## 2024-04-30 PROCEDURE — C1726 CATH, BAL DIL, NON-VASCULAR: HCPCS | Performed by: INTERNAL MEDICINE

## 2024-04-30 PROCEDURE — 258N000003 HC RX IP 258 OP 636: Performed by: ANESTHESIOLOGY

## 2024-04-30 PROCEDURE — 920N000001 HC INTERCOMPANY SERVICE, MISC 920 OPNP

## 2024-04-30 PROCEDURE — 84155 ASSAY OF PROTEIN SERUM: CPT | Performed by: INTERNAL MEDICINE

## 2024-04-30 PROCEDURE — 36415 COLL VENOUS BLD VENIPUNCTURE: CPT | Performed by: INTERNAL MEDICINE

## 2024-04-30 PROCEDURE — 258N000003 HC RX IP 258 OP 636: Performed by: INTERNAL MEDICINE

## 2024-04-30 PROCEDURE — 250N000013 HC RX MED GY IP 250 OP 250 PS 637: Performed by: INTERNAL MEDICINE

## 2024-04-30 PROCEDURE — 272N000006 HC INTERCOMPANY SERVICE, SUPPLY 272 OPNP: Performed by: NURSE ANESTHETIST, CERTIFIED REGISTERED

## 2024-04-30 PROCEDURE — 999N000141 HC STATISTIC PRE-PROCEDURE NURSING ASSESSMENT: Performed by: INTERNAL MEDICINE

## 2024-04-30 PROCEDURE — 250N000007 HC INTERCOMPANY SERVICE, DRUGS 250 OPNP: Performed by: NURSE ANESTHETIST, CERTIFIED REGISTERED

## 2024-04-30 PROCEDURE — 99232 SBSQ HOSP IP/OBS MODERATE 35: CPT | Performed by: NURSE PRACTITIONER

## 2024-04-30 PROCEDURE — 636N000001 HC INTERCOMPANY SERVICE, DRUGS DETAILED 636 OPNP: Performed by: NURSE ANESTHETIST, CERTIFIED REGISTERED

## 2024-04-30 PROCEDURE — 250N000007 HC INTERCOMPANY SERVICE, DRUGS 250 OPNP

## 2024-04-30 PROCEDURE — 250N000013 HC RX MED GY IP 250 OP 250 PS 637: Performed by: PHYSICIAN ASSISTANT

## 2024-04-30 PROCEDURE — 84460 ALANINE AMINO (ALT) (SGPT): CPT | Performed by: INTERNAL MEDICINE

## 2024-04-30 PROCEDURE — 82040 ASSAY OF SERUM ALBUMIN: CPT | Performed by: STUDENT IN AN ORGANIZED HEALTH CARE EDUCATION/TRAINING PROGRAM

## 2024-04-30 PROCEDURE — 920N000001 HC INTERCOMPANY SERVICE, MISC 920 OPNP: Performed by: NURSE ANESTHETIST, CERTIFIED REGISTERED

## 2024-04-30 PROCEDURE — 310N000078 HC INTERCOMPANY SERVICE, LAB PATH 310 OPNP

## 2024-04-30 PROCEDURE — 82805 BLOOD GASES W/O2 SATURATION: CPT | Performed by: INTERNAL MEDICINE

## 2024-04-30 PROCEDURE — 250N000011 HC RX IP 250 OP 636: Performed by: NURSE ANESTHETIST, CERTIFIED REGISTERED

## 2024-04-30 DEVICE — BILIARY STENT WITH NAVIFLEXTM RX DELIVERY SYSTEM
Type: IMPLANTABLE DEVICE | Site: ESOPHAGUS | Status: FUNCTIONAL
Brand: ADVANIX™ BILIARY

## 2024-04-30 RX ORDER — ONDANSETRON 2 MG/ML
4 INJECTION INTRAMUSCULAR; INTRAVENOUS EVERY 6 HOURS PRN
Status: CANCELLED | OUTPATIENT
Start: 2024-04-30

## 2024-04-30 RX ORDER — NALOXONE HYDROCHLORIDE 1 MG/ML
0.2 INJECTION INTRAMUSCULAR; INTRAVENOUS; SUBCUTANEOUS
Status: CANCELLED | OUTPATIENT
Start: 2024-04-30

## 2024-04-30 RX ORDER — CALCIUM CARBONATE 500 MG/1
1000 TABLET, CHEWABLE ORAL 4 TIMES DAILY PRN
Status: DISCONTINUED | OUTPATIENT
Start: 2024-04-30 | End: 2024-05-02 | Stop reason: HOSPADM

## 2024-04-30 RX ORDER — HYDROMORPHONE HCL IN WATER/PF 6 MG/30 ML
0.2 PATIENT CONTROLLED ANALGESIA SYRINGE INTRAVENOUS EVERY 5 MIN PRN
Status: DISCONTINUED | OUTPATIENT
Start: 2024-04-30 | End: 2024-04-30 | Stop reason: HOSPADM

## 2024-04-30 RX ORDER — ISOSORBIDE MONONITRATE 30 MG/1
30 TABLET, EXTENDED RELEASE ORAL DAILY
Status: DISCONTINUED | OUTPATIENT
Start: 2024-05-01 | End: 2024-05-02 | Stop reason: HOSPADM

## 2024-04-30 RX ORDER — AMLODIPINE BESYLATE 5 MG/1
5 TABLET ORAL DAILY
Status: DISCONTINUED | OUTPATIENT
Start: 2024-04-30 | End: 2024-04-30 | Stop reason: HOSPADM

## 2024-04-30 RX ORDER — SODIUM CHLORIDE, SODIUM LACTATE, POTASSIUM CHLORIDE, CALCIUM CHLORIDE 600; 310; 30; 20 MG/100ML; MG/100ML; MG/100ML; MG/100ML
INJECTION, SOLUTION INTRAVENOUS CONTINUOUS
Status: CANCELLED | OUTPATIENT
Start: 2024-04-30

## 2024-04-30 RX ORDER — NALOXONE HYDROCHLORIDE 0.4 MG/ML
0.2 INJECTION, SOLUTION INTRAMUSCULAR; INTRAVENOUS; SUBCUTANEOUS
Status: DISCONTINUED | OUTPATIENT
Start: 2024-04-30 | End: 2024-05-02 | Stop reason: HOSPADM

## 2024-04-30 RX ORDER — KETOROLAC TROMETHAMINE 30 MG/ML
15 INJECTION, SOLUTION INTRAMUSCULAR; INTRAVENOUS
Status: DISCONTINUED | OUTPATIENT
Start: 2024-04-30 | End: 2024-04-30 | Stop reason: HOSPADM

## 2024-04-30 RX ORDER — SODIUM BICARBONATE 650 MG/1
1950 TABLET ORAL 3 TIMES DAILY
Status: DISCONTINUED | OUTPATIENT
Start: 2024-04-30 | End: 2024-04-30 | Stop reason: HOSPADM

## 2024-04-30 RX ORDER — LORATADINE 10 MG/1
10 TABLET ORAL DAILY
Status: DISCONTINUED | OUTPATIENT
Start: 2024-04-30 | End: 2024-04-30 | Stop reason: HOSPADM

## 2024-04-30 RX ORDER — NALOXONE HYDROCHLORIDE 0.4 MG/ML
0.4 INJECTION, SOLUTION INTRAMUSCULAR; INTRAVENOUS; SUBCUTANEOUS
Status: DISCONTINUED | OUTPATIENT
Start: 2024-04-30 | End: 2024-05-02 | Stop reason: HOSPADM

## 2024-04-30 RX ORDER — LIDOCAINE 40 MG/G
CREAM TOPICAL
Status: CANCELLED | OUTPATIENT
Start: 2024-04-30

## 2024-04-30 RX ORDER — ONDANSETRON 4 MG/1
4 TABLET, ORALLY DISINTEGRATING ORAL EVERY 6 HOURS PRN
Status: CANCELLED | OUTPATIENT
Start: 2024-04-30

## 2024-04-30 RX ORDER — FENTANYL CITRATE 50 UG/ML
50 INJECTION, SOLUTION INTRAMUSCULAR; INTRAVENOUS EVERY 5 MIN PRN
Status: DISCONTINUED | OUTPATIENT
Start: 2024-04-30 | End: 2024-04-30 | Stop reason: HOSPADM

## 2024-04-30 RX ORDER — SODIUM CHLORIDE, SODIUM LACTATE, POTASSIUM CHLORIDE, CALCIUM CHLORIDE 600; 310; 30; 20 MG/100ML; MG/100ML; MG/100ML; MG/100ML
INJECTION, SOLUTION INTRAVENOUS CONTINUOUS
Status: DISCONTINUED | OUTPATIENT
Start: 2024-04-30 | End: 2024-04-30 | Stop reason: HOSPADM

## 2024-04-30 RX ORDER — NICOTINE POLACRILEX 4 MG
15-30 LOZENGE BUCCAL
Status: DISCONTINUED | OUTPATIENT
Start: 2024-04-30 | End: 2024-04-30 | Stop reason: HOSPADM

## 2024-04-30 RX ORDER — SODIUM BICARBONATE 650 MG/1
1950 TABLET ORAL 3 TIMES DAILY
Status: DISCONTINUED | OUTPATIENT
Start: 2024-04-30 | End: 2024-05-02 | Stop reason: HOSPADM

## 2024-04-30 RX ORDER — AMLODIPINE BESYLATE 5 MG/1
5 TABLET ORAL DAILY
Status: DISCONTINUED | OUTPATIENT
Start: 2024-05-01 | End: 2024-05-01

## 2024-04-30 RX ORDER — DIPHENHYDRAMINE HYDROCHLORIDE 50 MG/ML
25 INJECTION INTRAMUSCULAR; INTRAVENOUS EVERY 6 HOURS PRN
Status: DISCONTINUED | OUTPATIENT
Start: 2024-04-30 | End: 2024-04-30 | Stop reason: HOSPADM

## 2024-04-30 RX ORDER — LIDOCAINE 40 MG/G
CREAM TOPICAL
Status: DISCONTINUED | OUTPATIENT
Start: 2024-04-30 | End: 2024-04-30 | Stop reason: HOSPADM

## 2024-04-30 RX ORDER — ONDANSETRON 4 MG/1
4 TABLET, ORALLY DISINTEGRATING ORAL EVERY 6 HOURS PRN
Status: DISCONTINUED | OUTPATIENT
Start: 2024-04-30 | End: 2024-04-30 | Stop reason: HOSPADM

## 2024-04-30 RX ORDER — EMOLLIENT BASE
CREAM (GRAM) TOPICAL 3 TIMES DAILY
Status: DISCONTINUED | OUTPATIENT
Start: 2024-04-30 | End: 2024-04-30 | Stop reason: HOSPADM

## 2024-04-30 RX ORDER — SODIUM CHLORIDE 9 MG/ML
INJECTION, SOLUTION INTRAVENOUS CONTINUOUS
Status: DISCONTINUED | OUTPATIENT
Start: 2024-04-30 | End: 2024-05-01

## 2024-04-30 RX ORDER — SODIUM CHLORIDE, SODIUM LACTATE, POTASSIUM CHLORIDE, CALCIUM CHLORIDE 600; 310; 30; 20 MG/100ML; MG/100ML; MG/100ML; MG/100ML
INJECTION, SOLUTION INTRAVENOUS CONTINUOUS
Status: DISCONTINUED | OUTPATIENT
Start: 2024-04-30 | End: 2024-04-30

## 2024-04-30 RX ORDER — HYDROCORTISONE 2.5 %
CREAM (GRAM) TOPICAL 2 TIMES DAILY PRN
Status: DISCONTINUED | OUTPATIENT
Start: 2024-04-30 | End: 2024-04-30 | Stop reason: HOSPADM

## 2024-04-30 RX ORDER — DEXTROSE MONOHYDRATE 25 G/50ML
25-50 INJECTION, SOLUTION INTRAVENOUS
Status: DISCONTINUED | OUTPATIENT
Start: 2024-04-30 | End: 2024-04-30 | Stop reason: HOSPADM

## 2024-04-30 RX ORDER — HYDROMORPHONE HCL IN WATER/PF 6 MG/30 ML
0.4 PATIENT CONTROLLED ANALGESIA SYRINGE INTRAVENOUS EVERY 5 MIN PRN
Status: DISCONTINUED | OUTPATIENT
Start: 2024-04-30 | End: 2024-04-30 | Stop reason: HOSPADM

## 2024-04-30 RX ORDER — LIDOCAINE 40 MG/G
CREAM TOPICAL
Status: DISCONTINUED | OUTPATIENT
Start: 2024-04-30 | End: 2024-04-30

## 2024-04-30 RX ORDER — ACETAMINOPHEN 325 MG/1
975 TABLET ORAL ONCE
Status: COMPLETED | OUTPATIENT
Start: 2024-04-30 | End: 2024-04-30

## 2024-04-30 RX ORDER — HYDROMORPHONE HYDROCHLORIDE 1 MG/ML
0.3 INJECTION, SOLUTION INTRAMUSCULAR; INTRAVENOUS; SUBCUTANEOUS
Status: DISCONTINUED | OUTPATIENT
Start: 2024-04-30 | End: 2024-04-30 | Stop reason: HOSPADM

## 2024-04-30 RX ORDER — HALOPERIDOL 5 MG/ML
1 INJECTION INTRAMUSCULAR
Status: DISCONTINUED | OUTPATIENT
Start: 2024-04-30 | End: 2024-04-30 | Stop reason: HOSPADM

## 2024-04-30 RX ORDER — ISOSORBIDE MONONITRATE 30 MG/1
30 TABLET, EXTENDED RELEASE ORAL DAILY
Status: DISCONTINUED | OUTPATIENT
Start: 2024-04-30 | End: 2024-04-30 | Stop reason: HOSPADM

## 2024-04-30 RX ORDER — ONDANSETRON 2 MG/ML
4 INJECTION INTRAMUSCULAR; INTRAVENOUS EVERY 6 HOURS PRN
Status: DISCONTINUED | OUTPATIENT
Start: 2024-04-30 | End: 2024-05-02 | Stop reason: HOSPADM

## 2024-04-30 RX ORDER — FLUMAZENIL 0.1 MG/ML
0.2 INJECTION, SOLUTION INTRAVENOUS
Status: CANCELLED | OUTPATIENT
Start: 2024-04-30 | End: 2024-05-01

## 2024-04-30 RX ORDER — HYDROMORPHONE HCL IN WATER/PF 6 MG/30 ML
0.2 PATIENT CONTROLLED ANALGESIA SYRINGE INTRAVENOUS EVERY 4 HOURS PRN
Status: DISCONTINUED | OUTPATIENT
Start: 2024-04-30 | End: 2024-05-02 | Stop reason: HOSPADM

## 2024-04-30 RX ORDER — PROPOFOL 10 MG/ML
INJECTION, EMULSION INTRAVENOUS PRN
Status: DISCONTINUED | OUTPATIENT
Start: 2024-04-30 | End: 2024-04-30

## 2024-04-30 RX ORDER — ONDANSETRON 2 MG/ML
4 INJECTION INTRAMUSCULAR; INTRAVENOUS EVERY 6 HOURS PRN
Status: DISCONTINUED | OUTPATIENT
Start: 2024-04-30 | End: 2024-04-30 | Stop reason: HOSPADM

## 2024-04-30 RX ORDER — DEXAMETHASONE SODIUM PHOSPHATE 10 MG/ML
INJECTION, SOLUTION INTRAMUSCULAR; INTRAVENOUS PRN
Status: DISCONTINUED | OUTPATIENT
Start: 2024-04-30 | End: 2024-04-30

## 2024-04-30 RX ORDER — ONDANSETRON 2 MG/ML
4 INJECTION INTRAMUSCULAR; INTRAVENOUS EVERY 6 HOURS PRN
Status: DISCONTINUED | OUTPATIENT
Start: 2024-04-30 | End: 2024-04-30

## 2024-04-30 RX ORDER — AMOXICILLIN 250 MG
2 CAPSULE ORAL 2 TIMES DAILY PRN
Status: DISCONTINUED | OUTPATIENT
Start: 2024-04-30 | End: 2024-05-02 | Stop reason: HOSPADM

## 2024-04-30 RX ORDER — NICOTINE POLACRILEX 4 MG
15-30 LOZENGE BUCCAL
Status: DISCONTINUED | OUTPATIENT
Start: 2024-04-30 | End: 2024-05-02 | Stop reason: HOSPADM

## 2024-04-30 RX ORDER — URSODIOL 300 MG/1
300 CAPSULE ORAL 2 TIMES DAILY
Status: DISCONTINUED | OUTPATIENT
Start: 2024-04-30 | End: 2024-04-30 | Stop reason: HOSPADM

## 2024-04-30 RX ORDER — DEXTROSE MONOHYDRATE 25 G/50ML
25-50 INJECTION, SOLUTION INTRAVENOUS
Status: DISCONTINUED | OUTPATIENT
Start: 2024-04-30 | End: 2024-05-02 | Stop reason: HOSPADM

## 2024-04-30 RX ORDER — OXYCODONE HYDROCHLORIDE 5 MG/1
10 TABLET ORAL
Status: DISCONTINUED | OUTPATIENT
Start: 2024-04-30 | End: 2024-04-30 | Stop reason: HOSPADM

## 2024-04-30 RX ORDER — FENTANYL CITRATE 50 UG/ML
25 INJECTION, SOLUTION INTRAMUSCULAR; INTRAVENOUS EVERY 5 MIN PRN
Status: DISCONTINUED | OUTPATIENT
Start: 2024-04-30 | End: 2024-04-30 | Stop reason: HOSPADM

## 2024-04-30 RX ORDER — NALOXONE HYDROCHLORIDE 0.4 MG/ML
0.4 INJECTION, SOLUTION INTRAMUSCULAR; INTRAVENOUS; SUBCUTANEOUS
Status: DISCONTINUED | OUTPATIENT
Start: 2024-04-30 | End: 2024-04-30

## 2024-04-30 RX ORDER — NALOXONE HYDROCHLORIDE 0.4 MG/ML
0.1 INJECTION, SOLUTION INTRAMUSCULAR; INTRAVENOUS; SUBCUTANEOUS
Status: DISCONTINUED | OUTPATIENT
Start: 2024-04-30 | End: 2024-04-30 | Stop reason: HOSPADM

## 2024-04-30 RX ORDER — OXYCODONE HYDROCHLORIDE 5 MG/1
5 TABLET ORAL
Status: DISCONTINUED | OUTPATIENT
Start: 2024-04-30 | End: 2024-04-30 | Stop reason: HOSPADM

## 2024-04-30 RX ORDER — AMOXICILLIN 250 MG
2 CAPSULE ORAL 2 TIMES DAILY PRN
Status: DISCONTINUED | OUTPATIENT
Start: 2024-04-30 | End: 2024-04-30 | Stop reason: HOSPADM

## 2024-04-30 RX ORDER — NALOXONE HYDROCHLORIDE 0.4 MG/ML
0.2 INJECTION, SOLUTION INTRAMUSCULAR; INTRAVENOUS; SUBCUTANEOUS
Status: DISCONTINUED | OUTPATIENT
Start: 2024-04-30 | End: 2024-04-30

## 2024-04-30 RX ORDER — URSODIOL 300 MG/1
300 CAPSULE ORAL 2 TIMES DAILY
Status: DISCONTINUED | OUTPATIENT
Start: 2024-04-30 | End: 2024-05-02 | Stop reason: HOSPADM

## 2024-04-30 RX ORDER — LIDOCAINE HYDROCHLORIDE 10 MG/ML
INJECTION, SOLUTION INFILTRATION; PERINEURAL PRN
Status: DISCONTINUED | OUTPATIENT
Start: 2024-04-30 | End: 2024-04-30

## 2024-04-30 RX ORDER — DIPHENHYDRAMINE HCL 25 MG
25 CAPSULE ORAL EVERY 6 HOURS PRN
Status: DISCONTINUED | OUTPATIENT
Start: 2024-04-30 | End: 2024-04-30 | Stop reason: HOSPADM

## 2024-04-30 RX ORDER — FENTANYL CITRATE 50 UG/ML
INJECTION, SOLUTION INTRAMUSCULAR; INTRAVENOUS PRN
Status: DISCONTINUED | OUTPATIENT
Start: 2024-04-30 | End: 2024-04-30

## 2024-04-30 RX ORDER — NALOXONE HYDROCHLORIDE 1 MG/ML
0.4 INJECTION INTRAMUSCULAR; INTRAVENOUS; SUBCUTANEOUS
Status: CANCELLED | OUTPATIENT
Start: 2024-04-30

## 2024-04-30 RX ORDER — AMOXICILLIN 250 MG
1 CAPSULE ORAL 2 TIMES DAILY PRN
Status: DISCONTINUED | OUTPATIENT
Start: 2024-04-30 | End: 2024-05-02 | Stop reason: HOSPADM

## 2024-04-30 RX ORDER — ONDANSETRON 4 MG/1
4 TABLET, ORALLY DISINTEGRATING ORAL EVERY 30 MIN PRN
Status: DISCONTINUED | OUTPATIENT
Start: 2024-04-30 | End: 2024-04-30 | Stop reason: HOSPADM

## 2024-04-30 RX ORDER — GUAIFENESIN/DEXTROMETHORPHAN 100-10MG/5
10 SYRUP ORAL EVERY 4 HOURS PRN
Status: DISCONTINUED | OUTPATIENT
Start: 2024-04-30 | End: 2024-04-30 | Stop reason: HOSPADM

## 2024-04-30 RX ORDER — AMOXICILLIN 250 MG
1 CAPSULE ORAL 2 TIMES DAILY PRN
Status: DISCONTINUED | OUTPATIENT
Start: 2024-04-30 | End: 2024-04-30 | Stop reason: HOSPADM

## 2024-04-30 RX ORDER — MEPERIDINE HYDROCHLORIDE 25 MG/ML
12.5 INJECTION INTRAMUSCULAR; INTRAVENOUS; SUBCUTANEOUS EVERY 5 MIN PRN
Status: DISCONTINUED | OUTPATIENT
Start: 2024-04-30 | End: 2024-04-30 | Stop reason: HOSPADM

## 2024-04-30 RX ORDER — LORAZEPAM 2 MG/ML
.5-1 INJECTION INTRAMUSCULAR
Status: DISCONTINUED | OUTPATIENT
Start: 2024-04-30 | End: 2024-04-30 | Stop reason: HOSPADM

## 2024-04-30 RX ORDER — FLUMAZENIL 0.1 MG/ML
0.2 INJECTION, SOLUTION INTRAVENOUS
Status: DISCONTINUED | OUTPATIENT
Start: 2024-04-30 | End: 2024-04-30

## 2024-04-30 RX ORDER — ONDANSETRON 2 MG/ML
4 INJECTION INTRAMUSCULAR; INTRAVENOUS EVERY 30 MIN PRN
Status: DISCONTINUED | OUTPATIENT
Start: 2024-04-30 | End: 2024-04-30 | Stop reason: HOSPADM

## 2024-04-30 RX ORDER — ONDANSETRON 4 MG/1
4 TABLET, ORALLY DISINTEGRATING ORAL EVERY 6 HOURS PRN
Status: DISCONTINUED | OUTPATIENT
Start: 2024-04-30 | End: 2024-04-30

## 2024-04-30 RX ORDER — OXYCODONE HYDROCHLORIDE 5 MG/1
5 TABLET ORAL EVERY 6 HOURS PRN
Status: DISCONTINUED | OUTPATIENT
Start: 2024-04-30 | End: 2024-04-30 | Stop reason: HOSPADM

## 2024-04-30 RX ORDER — ONDANSETRON 2 MG/ML
INJECTION INTRAMUSCULAR; INTRAVENOUS PRN
Status: DISCONTINUED | OUTPATIENT
Start: 2024-04-30 | End: 2024-04-30

## 2024-04-30 RX ORDER — OXYCODONE HYDROCHLORIDE 5 MG/1
5 TABLET ORAL EVERY 4 HOURS PRN
Status: DISCONTINUED | OUTPATIENT
Start: 2024-04-30 | End: 2024-05-02 | Stop reason: HOSPADM

## 2024-04-30 RX ORDER — LIDOCAINE 40 MG/G
CREAM TOPICAL
Status: DISCONTINUED | OUTPATIENT
Start: 2024-04-30 | End: 2024-05-02 | Stop reason: HOSPADM

## 2024-04-30 RX ADMIN — URSOSIOL 300 MG: 300 CAPSULE ORAL at 08:11

## 2024-04-30 RX ADMIN — PROPOFOL 150 MG: 10 INJECTION, EMULSION INTRAVENOUS at 14:25

## 2024-04-30 RX ADMIN — SODIUM BICARBONATE 650 MG TABLET 1950 MG: at 22:58

## 2024-04-30 RX ADMIN — FENTANYL CITRATE 100 MCG: 50 INJECTION INTRAMUSCULAR; INTRAVENOUS at 14:25

## 2024-04-30 RX ADMIN — ISOSORBIDE MONONITRATE 30 MG: 30 TABLET, EXTENDED RELEASE ORAL at 08:04

## 2024-04-30 RX ADMIN — SODIUM BICARBONATE 650 MG TABLET 1950 MG: at 08:04

## 2024-04-30 RX ADMIN — LORATADINE 10 MG: 10 TABLET ORAL at 08:04

## 2024-04-30 RX ADMIN — ONDANSETRON 4 MG: 2 INJECTION INTRAMUSCULAR; INTRAVENOUS at 14:35

## 2024-04-30 RX ADMIN — SODIUM CHLORIDE, POTASSIUM CHLORIDE, SODIUM LACTATE AND CALCIUM CHLORIDE: 600; 310; 30; 20 INJECTION, SOLUTION INTRAVENOUS at 11:24

## 2024-04-30 RX ADMIN — URSOSIOL 300 MG: 300 CAPSULE ORAL at 22:58

## 2024-04-30 RX ADMIN — AMLODIPINE BESYLATE 5 MG: 5 TABLET ORAL at 08:05

## 2024-04-30 RX ADMIN — SODIUM CHLORIDE: 9 INJECTION, SOLUTION INTRAVENOUS at 22:57

## 2024-04-30 RX ADMIN — LIDOCAINE HYDROCHLORIDE 2 ML: 10 INJECTION, SOLUTION INFILTRATION; PERINEURAL at 14:35

## 2024-04-30 RX ADMIN — Medication 100 MG: at 14:25

## 2024-04-30 RX ADMIN — Medication 10 MG: at 23:53

## 2024-04-30 RX ADMIN — DEXAMETHASONE SODIUM PHOSPHATE 4 MG: 10 INJECTION, SOLUTION INTRAMUSCULAR; INTRAVENOUS at 14:35

## 2024-04-30 RX ADMIN — PHENYLEPHRINE HYDROCHLORIDE 200 MCG: 10 INJECTION INTRAVENOUS at 14:48

## 2024-04-30 RX ADMIN — PHENYLEPHRINE HYDROCHLORIDE 200 MCG: 10 INJECTION INTRAVENOUS at 14:25

## 2024-04-30 RX ADMIN — PROPOFOL 50 MG: 10 INJECTION, EMULSION INTRAVENOUS at 14:34

## 2024-04-30 ASSESSMENT — ACTIVITIES OF DAILY LIVING (ADL)
ADLS_ACUITY_SCORE: 39
ADLS_ACUITY_SCORE: 39
ADLS_ACUITY_SCORE: 24
ADLS_ACUITY_SCORE: 39
ADLS_ACUITY_SCORE: 39
ADLS_ACUITY_SCORE: 24
ADLS_ACUITY_SCORE: 39
ADLS_ACUITY_SCORE: 25
ADLS_ACUITY_SCORE: 24
ADLS_ACUITY_SCORE: 24
ADLS_ACUITY_SCORE: 25
ADLS_ACUITY_SCORE: 35
ADLS_ACUITY_SCORE: 25
ADLS_ACUITY_SCORE: 39
ADLS_ACUITY_SCORE: 37
ADLS_ACUITY_SCORE: 24

## 2024-04-30 NOTE — ANESTHESIA PREPROCEDURE EVALUATION
Anesthesia Pre-Procedure Evaluation    Patient: Lance Ibrahim   MRN: [id number] : 1945        Procedure : Procedure(s):  ESOPHAGOGASTRODUODENOSCOPY, WITH ENDOSCOPIC ULTRASOUND GUIDANCE  ENDOSCOPIC RETROGRADE CHOLANGIOPANCREATOGRAPHY          Past Medical History:   Diagnosis Date    Anemia     Arthritis     osteoarthritis knees    BPH     CAD (coronary artery disease)     subtotal occlusion in the small distal LAD     Cardiomyopathy     Cerebral artery occlusion with cerebral infarction     TIA , no residual    Cervicalgia     CHF (congestive heart failure) (H)     Chronic kidney disease     Chronic low back pain     Chronic rhinitis     Gouty arthropathy     Hyperlipidemia     Hypertension     Kidney stone     Nocturia     Obesity     PVD (peripheral vascular disease)     Sleep apnea     doesn't use cpap    TIA (transient ischaemic attack)     Type 2 diabetes mellitus - 2018    Ureteral stone       Past Surgical History:   Procedure Laterality Date    AMPUTATE FOOT Right 2023    Procedure: AMPUTATION, right hallux;  Surgeon: Nakul Salazar DPM;  Location: [facility] [place] OR    AMPUTATE TOE(S) Left 10/15/2018    Procedure: AMPUTATE TOE(S);  Left third toe amputation;  Surgeon: Ayaka Azevedo DPM, Podiatry/Foot and Ankle Surgery;  Location:  OR    ARTHROPLASTY KNEE Right 2018    Procedure: Right total knee arthroplasty;  Surgeon: Issa Cunha MD;  Location:  OR    COLONOSCOPY  2013    Procedure: COLONOSCOPY;  COLONOSCOPY;  Surgeon: Chau Hogan MD;  Location:  GI    CV HEART CATHETERIZATION WITH POSSIBLE INTERVENTION Left 2019    Procedure: Coronary Angiogram;  Surgeon: Nas Linda MD;  Location:  HEART CARDIAC CATH LAB    Diastasis recti repair      EXTRACAPSULAR CATARACT EXTRATION WITH INTRAOCULAR LENS IMPLANT Left 2017    EXTRACAPSULAR CATARACT EXTRATION WITH INTRAOCULAR LENS IMPLANT Right     FOOT SURGERY  2013    cyst  "removal     HERNIA REPAIR  2004    ventral     ORIF Shoulder Left     urolift  01/15/2024    Ventral hernia repair NOS  1987      Allergies   Allergen Reactions    Penicillins Anaphylaxis     22 - tolerated cefepime     Sulfa Antibiotics      \"itchy rash, swelling of face and hands\"    Ancef [Cefazolin] Rash    Cephalexin Itching and Rash      Social History     Tobacco Use    Smoking status: Former     Current packs/day: 0.00     Types: Cigarettes     Quit date: 3/17/1993     Years since quittin.1    Smokeless tobacco: Never   Substance Use Topics    Alcohol use: Not Currently      Wt Readings from Last 1 Encounters:   24 93.4 kg (206 lb)        Anesthesia Evaluation            ROS/MED HX  ENT/Pulmonary:     (+) sleep apnea,                                       Neurologic:       Cardiovascular:     (+)  hypertension- -  CAD -  - -      CHF     ISBELL.                           METS/Exercise Tolerance:     Hematologic:       Musculoskeletal:       GI/Hepatic:       Renal/Genitourinary:     (+) renal disease,             Endo:     (+) type I DM,                     Psychiatric/Substance Use:       Infectious Disease:       Malignancy:       Other:            Physical Exam    Airway        Mallampati: II    Neck ROM: full     Respiratory Devices and Support         Dental       (+) Minor Abnormalities - some fillings, tiny chips      Cardiovascular   cardiovascular exam normal          Pulmonary   pulmonary exam normal                OUTSIDE LABS:  CBC:   Lab Results   Component Value Date    WBC 9.3 2024    WBC 7.8 2024    HGB 11.1 (L) 2024    HGB 10.3 (L) 2024    HCT 33.2 (L) 2024    HCT 30.7 (L) 2024     2024     2024     BMP:   Lab Results   Component Value Date     2024     2024    POTASSIUM 3.8 2024    POTASSIUM 4.2 2024    CHLORIDE 107 2024    CHLORIDE 109 (H) 2024    CO2 20 (L) " 04/30/2024    CO2 15 (L) 04/29/2024    BUN 25.3 (H) 04/30/2024    BUN 26.2 (H) 04/29/2024    CR 1.56 (H) 04/30/2024    CR 1.64 (H) 04/29/2024     (H) 04/30/2024     (H) 04/30/2024     COAGS:   Lab Results   Component Value Date    PTT 35 03/04/2019    INR 1.11 04/26/2024     POC:   Lab Results   Component Value Date    BGM 93 07/28/2020     HEPATIC:   Lab Results   Component Value Date    ALBUMIN 3.2 (L) 04/30/2024    PROTTOTAL 6.4 04/30/2024     (H) 04/30/2024     (H) 04/30/2024    ALKPHOS 783 (H) 04/30/2024    BILITOTAL 7.7 (H) 04/30/2024     OTHER:   Lab Results   Component Value Date    PH 7.48 (H) 10/17/2018    LACT 1.8 04/26/2024    A1C 8.0 (H) 02/27/2024    SHANNAN 9.4 04/30/2024    PHOS 2.2 (L) 04/28/2024    MAG 1.9 01/24/2020    LIPASE 163 (H) 04/27/2024    TSH 3.24 01/24/2020    CRP 4.7 (H) 08/25/2022    SED 29 (H) 01/17/2024       Anesthesia Plan    ASA Status:  3       Anesthesia Type: General.     - Airway: ETT              Consents    Anesthesia Plan(s) and associated risks, benefits, and realistic alternatives discussed. Questions answered and patient/representative(s) expressed understanding.     - Discussed: Risks, Benefits and Alternatives for the PROCEDURE were discussed     - Discussed with:  Patient      - Extended Intubation/Ventilatory Support Discussed: No.      - Patient is DNR/DNI Status: No     Use of blood products discussed: No .     Postoperative Care    Pain management: Multi-modal analgesia.        Comments:               Leana Daigle MD    I have reviewed the pertinent notes and labs in the chart from the past 30 days and (re)examined the patient.  Any updates or changes from those notes are reflected in this note.     # Hyponatremia: Lowest Na = 132 mmol/L in last 30 days, will monitor as appropriate      # Hypoalbuminemia: Lowest albumin = 3.2 g/dL at 4/30/2024  6:19 AM, will monitor as appropriate    # Drug Induced Platelet Defect: home medication list  includes an antiplatelet medication  # DMII: A1C = 8.0 % (Ref range: 0.0 - 5.6 %) within past 6 months  # Overweight: Estimated body mass index is 27.94 kg/m  as calculated from the following:    Height as of this encounter: 1.829 m (6').    Weight as of this encounter: 93.4 kg (206 lb).

## 2024-04-30 NOTE — ANESTHESIA PROCEDURE NOTES
Airway       Patient location during procedure: OR       Procedure Start/Stop Times: 4/30/2024 2:27 PM  Staff -        CRNA: Rukhsana Miller APRN CRNA       Performed By: CRNA  Consent for Airway        Urgency: elective  Indications and Patient Condition       Indications for airway management: shukri-procedural       Induction type:intravenous       Mask difficulty assessment: 1 - vent by mask    Final Airway Details       Final airway type: endotracheal airway       Successful airway: ETT - single  Endotracheal Airway Details        ETT size (mm): 7.5       Cuffed: yes       Cuff volume (mL): 5       Successful intubation technique: direct laryngoscopy       DL Blade Type: MAC 3       Grade View of Cords: 1       Adjucts: stylet       Position: Right       Measured from: gums/teeth       Secured at (cm): 24       Bite block used: Soft    Post intubation assessment        Placement verified by: capnometry, equal breath sounds and chest rise        Number of attempts at approach: 1       Number of other approaches attempted: 0       Secured with: tape       Ease of procedure: easy       Dentition: Intact    Medication(s) Administered   Medication Administration Time: 4/30/2024 2:27 PM      
No

## 2024-04-30 NOTE — PROGRESS NOTES
Patient A&O x4 and pleasant. VSS on RA. Pain is denied. Patient ambulates independently in room. PIV is patent and saline locked. Tolerating oral intake and voiding appropriately. Last BM on 4/29. Patient resting in bed. Patient is safe, call light within reach.    Pt was able to teach back and demonstrate insulin injection x2 this shift.    Kia Olsen RN

## 2024-04-30 NOTE — PLAN OF CARE
Goal Outcome Evaluation:      Problem: Adult Inpatient Plan of Care  Goal: Optimal Comfort and Wellbeing  Outcome: Progressing     Problem: Risk for Delirium  Goal: Improved Attention and Thought Clarity  Intervention: Maximize Cognitive Function  Recent Flowsheet Documentation  Taken 4/30/2024 0741 by Odin Muhammad RN  Sensory Stimulation Regulation: television on  Reorientation Measures:   calendar in view   clock in view   glasses use encouraged     Problem: Allergic/Anaphylactic Reaction  Goal: Resolution of Hypersensitivity Symptoms  Intervention: Manage Acute Allergic Reaction  Recent Flowsheet Documentation  Taken 4/30/2024 0741 by Odin Muhammad RN  Medication Review/Management: medications reviewed     Problem: Fall Injury Risk  Goal: Absence of Fall and Fall-Related Injury  Outcome: Progressing     Patient alert and oriented x 4, speech clear on room air.  Denies pain.  Lungs clear. No cough.  Has bilateral feet numbness.  (R) SL flushed and green cap) patient C/D/I.  Took am medications with 30 ml water.  Otherwise NPO since midnight.  Independent with  bed mobility, ambulates with rolling seated walker.  Last BM 4/29/24.  Up to bathroom to void. AC/HS no coverage given as NPO.  Patient has diabetic folder and working with staff on insulin administration, has own blood glucose machine as home.  Personal belongings sent for transfer to [facility] for ERCP.  [facility] called for reports but left on lengthy hold.

## 2024-04-30 NOTE — PROGRESS NOTES
RENAL PROGRESS NOTE    REASON FOR CONSULT:     metabolic acidosis      PLAN:  NPO for ERCP at [facility] today  Cont  sodium bicarb 1950 TID for discharge  OK to resume ACE when needed, given RF is back to his baseline   BMP in  the a.m.  No renal changes today    Pt has f/up appt on May 6th in our clinic 2:30 p.m.with Chrissie Yeh NP        ASSESSMENT/PLAN:  Acute on Chronic Kidney Dz (3):  Improving, Non-oliguric,   likely pre-renal DEAN, reported very low intake and dehydration PTA with ongoing diuretic use, high bili may be contributing to some degree   Baseline sCR in the 1.3-1.6 2021-22, with progressive decline and variability 1.5-2.2 in 2023  sCr peaked 2.2 on admission, improved  to 1.6, his presumed baseline more recently (eGFR 30-40 range )  H/o intermittent microhematuria, harshal UA/micro   Historically modest proteinuria by dip, no recent quant, ck up/c, no h/o MDS, ck serum immunofix given persistent proteinuria (though likely d/t DMN, cannot r/o occult etiol, shu with h/o microhematuria)  CT A/P: no renal stones or hydro, cyst L     Acute on mild chronic Metabolic acidosis:  Lactate initially 3-->1.8 post IVF's  Advancing CKD, retained Hydrogen and reduced ammonia excretion-->worsening acidosis, with Normal K (ck Phos) , may also have mild hypercholremic acidosis with IVF's   Would RETIRE Metformin with potential to contribute to worsening acidosis with reduced RF  Cont oral bicarb and trend      Hypophos:  ? D/t RTA vs very low intake PTA , enc phos intake , not on binders     DM (2);  With neuropathy (on Gabapentin, high dose, consider renal dosing for discharge),   likely nephropathy with proteinuria, on ACE  Sliding scale insulin per [facility]  PTA Metformin (with DEAN/Lactic acidosis, would retire given risk of exacerbating acidosis) and  Semaglutide     Elevated bilirubin:  MRCO sggestive of obstructive jaundice ? Choleduocholithiasis, stricute and possible panc head mass, planning ERCP today , GI  following      Anemia:  On oral iron PTA   Hgb 9s, not yet needing BUFFY therapy      H/o BPH:  Has followed with urology, last seen 2023 (DR Scales) , prior urolift, flomax dc'd at that visit      Pulm nodule:  As per [facility], plans for outpt w/up, surveillance     SUBJECTIVE:  NPO for ERCP, no pain, no edema, urinating ok.  Discussed recommended f/up in our renal clinic in the next few weeks.  No new complaints, unclear if he will remain admitted     OBJECTIVE:  Physical Exam   Temp: 98.3  F (36.8  C) Temp src: Oral BP: 136/66 Pulse: 94   Resp: 16 SpO2: 97 % O2 Device: None (Room air)    Vitals:    24 0300 24   Weight: 96.3 kg (212 lb 6.4 oz) 94 kg (207 lb 4.8 oz) 93.6 kg (206 lb 6.4 oz)     Vital Signs with Ranges  Temp:  [98  F (36.7  C)-98.3  F (36.8  C)] 98.3  F (36.8  C)  Pulse:  [94-98] 94  Resp:  [16-18] 16  BP: (136-150)/(65-76) 136/66  SpO2:  [97 %-98 %] 97 %  I/O last 3 completed shifts:  In: 620 [P.O.:620]  Out: -     Temp (24hrs), Av.9  F (36.6  C), Min:97.7  F (36.5  C), Max:98.3  F (36.8  C)      Patient Vitals for the past 72 hrs:   Weight   24 030 93.6 kg (206 lb 6.4 oz)   24 001 94 kg (207 lb 4.8 oz)   24 030 96.3 kg (212 lb 6.4 oz)     Intake/Output Summary (Last 24 hours) at 2024 0936  Last data filed at 2024 0700  Gross per 24 hour   Intake 1240 ml   Output 100 ml   Net 1140 ml       PHYSICAL EXAM:  General - Alert and oriented x3, appears comfortable, NAD, sitting up in chair   Cardiovascular - Rate and sBP controlled  Respiratory - on RA   Abd: BS present, no guarding or pain with palpation, no ascites  Extremities - No obvious lower extremity edema bilaterally  Skin: dry, intact, no rash, good turgor  Neuro:  Grossly intact, no focal deficits  MSK:  Grossly intact  Psych: flat ffect  :  UO not doc    wt is stable     LABORATORY STUDIES:     Recent Labs   Lab 24  0523 24  0559 24  0430 24  2329   WBC 9.3  7.8 11.4* 10.6   RBC 3.83* 3.57* 4.10* 4.42   HGB 11.1* 10.3* 11.9* 12.7*   HCT 33.2* 30.7* 35.5* 38.2*    199 253 252       Basic Metabolic Panel:  Recent Labs   Lab 04/30/24  0757 04/30/24  0619 04/30/24  0326 04/29/24  2123 04/29/24  1731 04/29/24  1205 04/29/24  0639 04/29/24  0625 04/28/24  0638 04/28/24  0523 04/27/24  0655 04/27/24  0559 04/26/24  1057 04/26/24  0852 04/26/24  0439 04/26/24  0430   NA  --  140  --   --   --   --   --  139  --  138  --  138  --  136  --  138   POTASSIUM  --  3.8  --   --   --   --   --  4.2  --  4.0  --  4.1  --  3.8  --  3.8   CHLORIDE  --  107  --   --   --   --   --  109*  --  110*  --  109*  --  109*  --  111*   CO2  --  20*  --   --   --   --   --  15*  --  16*  --  18*  --  16*  --  14*   BUN  --  25.3*  --   --   --   --   --  26.2*  --  29.5*  --  35.2*  --  50.8*  --  51.7*   CR  --  1.56*  --   --   --   --   --  1.64*  --  1.58*  --  1.72*  --  1.93*  --  2.05*   * 199* 198* 201* 256* 279*   < > 213*   < > 210*   < > 184*   < > 317*   < > 197*   SHANNAN  --  9.4  --   --   --   --   --  9.5  --  9.5  --  9.1  --  8.6*  --  9.0    < > = values in this interval not displayed.       INR  Recent Labs   Lab 04/26/24  0430   INR 1.11        Recent Labs   Lab Test 04/28/24  0523 04/27/24  0559 04/26/24  0430 06/16/22  0934 12/25/21 2000   INR  --   --  1.11  --  1.17*   WBC 9.3 7.8 11.4*   < > 18.6*   HGB 11.1* 10.3* 11.9*   < > 12.7*    199 253   < > 379    < > = values in this interval not displayed.       Personally reviewed current labs      [place], [facility]  [facility]  [phone]

## 2024-04-30 NOTE — ANESTHESIA POSTPROCEDURE EVALUATION
Patient: Lance Ibrahim    Procedure: Procedure(s):  ESOPHAGOGASTRODUODENOSCOPY, WITH ENDOSCOPIC ULTRASOUND GUIDANCE  ENDOSCOPIC RETROGRADE CHOLANGIOPANCREATOGRAPHY, BILIARY SPHINCTEROTOMY, DILATION, BRUSHING, BIOPSIES with DISTAL EXTRA HEPATIC BILE DUCT STRICTURE       Anesthesia Type:  MAC    Note:  Disposition: Outpatient   Postop Pain Control: Uneventful            Sign Out: Well controlled pain   PONV: No   Neuro/Psych: Uneventful            Sign Out: Acceptable/Baseline neuro status   Airway/Respiratory: Uneventful            Sign Out: Acceptable/Baseline resp. status   CV/Hemodynamics: Uneventful            Sign Out: Acceptable CV status; No obvious hypovolemia; No obvious fluid overload   Other NRE: NONE   DID A NON-ROUTINE EVENT OCCUR? No           Last vitals:  Vitals Value Taken Time   /73 04/30/24 1645   Temp 36.9  C (98.4  F) 04/30/24 1600   Pulse 100 04/30/24 1643   Resp 14 04/30/24 1630   SpO2 95 % 04/30/24 1644   Vitals shown include unfiled device data.    Electronically Signed By: Dylan Gomez MD  April 30, 2024  5:23 PM

## 2024-04-30 NOTE — H&P
GENERAL PRE-PROCEDURE:   Procedure:  EUS with ERCP - Dilated Bile Duct Elevated LFTs  Date/Time:  4/30/2024 1:16 PM    Verbal consent obtained?: Yes    Written consent obtained?: Yes    Risks and benefits: Risks, benefits and alternatives were discussed    Consent given by:  Patient  Patient states understanding of procedure being performed: Yes    Patient's understanding of procedure matches consent: Yes    Procedure consent matches procedure scheduled: Yes    Expected level of sedation:  Deep  Appropriately NPO:  Yes  ASA Class:  3  Mallampati  :  Grade 2- soft palate, base of uvula, tonsillar pillars, and portion of posterior pharyngeal wall visible  Lungs:  Lungs clear with good breath sounds bilaterally  Heart:  Normal heart sounds and rate and systolic murmur  History & Physical reviewed:  History and physical reviewed and no updates needed  Statement of review:  I have reviewed the lab findings, diagnostic data, medications, and the plan for sedation

## 2024-04-30 NOTE — PLAN OF CARE
Problem: Adult Inpatient Plan of Care  Goal: Optimal Comfort and Wellbeing  Outcome: Progressing   Goal Outcome Evaluation:  VSS and afebrile. No complaints of pain overnight. Has been NPO since 0000 for ERCP today at [facility], transport is already set up. IV is saline locked.

## 2024-04-30 NOTE — ANESTHESIA CARE TRANSFER NOTE
Patient: Lance Ibrahim    Procedure: Procedure(s):  ESOPHAGOGASTRODUODENOSCOPY, WITH ENDOSCOPIC ULTRASOUND GUIDANCE  ENDOSCOPIC RETROGRADE CHOLANGIOPANCREATOGRAPHY, BILIARY SPHINCTEROTOMY, DILATION, BRUSHING, BIOPSIES with DISTAL EXTRA HEPATIC BILE DUCT STRICTURE       Diagnosis: Elevated LFTs [R79.89]  Diagnosis Additional Information: No value filed.    Anesthesia Type:   MAC     Note:    Oropharynx: oropharynx clear of all foreign objects and spontaneously breathing  Level of Consciousness: awake  Oxygen Supplementation: face mask    Independent Airway: airway patency satisfactory and stable  Dentition: dentition unchanged  Vital Signs Stable: post-procedure vital signs reviewed and stable  Report to RN Given: handoff report given  Patient transferred to: PACU    Handoff Report: Identifed the Patient, Identified the Reponsible Provider, Reviewed the pertinent medical history, Discussed the surgical course, Reviewed Intra-OP anesthesia mangement and issues during anesthesia, Set expectations for post-procedure period and Allowed opportunity for questions and acknowledgement of understanding  Vitals:  Vitals Value Taken Time   BP     Temp     Pulse 99 04/30/24 1523   Resp 17 04/30/24 1523   SpO2 100 % 04/30/24 1523   Vitals shown include unfiled device data.    Electronically Signed By: RANJAN Mendoza CRNA  April 30, 2024  3:24 PM

## 2024-04-30 NOTE — PLAN OF CARE
"  Problem: Adult Inpatient Plan of Care  Goal: Absence of Hospital-Acquired Illness or Injury  Intervention: Prevent Skin Injury  Recent Flowsheet Documentation  Taken 4/30/2024 1820 by Odin Muhammad RN  Body Position: supine  Taken 4/30/2024 1800 by Odin Muhammad RN  Body Position: supine  Skin Protection: adhesive use limited  Device Skin Pressure Protection: adhesive use limited     Problem: Fall Injury Risk  Goal: Absence of Fall and Fall-Related Injury  Intervention: Identify and Manage Contributors  Recent Flowsheet Documentation  Taken 4/30/2024 1800 by Odin Muhammad RN  Medication Review/Management: medications reviewed   Goal Outcome Evaluation:      Patient alert and oriented x 4, speech clear.  Lungs clear no SOB.  On room air.  Returned and readmitted from [facility] after ERCP.  Ordered clear liquids.  PIV SL and flushed and green capped.  Last BM 4/29/24.  Voiding without difficulty.  Ambulates with rolling walker independent.  Able to get in and out of bed independently.  Vocera to Dr La as patient requested a different diet order from clear liguids and patient reports,\"Advance directive on file Full Code.\"  Requesting order 1824. Scabbed rash on entire body.   "

## 2024-05-01 LAB
ALBUMIN SERPL BCG-MCNC: 3.3 G/DL (ref 3.5–5.2)
ALP SERPL-CCNC: 750 U/L (ref 40–150)
ALT SERPL W P-5'-P-CCNC: 241 U/L (ref 0–70)
ANION GAP SERPL CALCULATED.3IONS-SCNC: 16 MMOL/L (ref 7–15)
AST SERPL W P-5'-P-CCNC: 163 U/L (ref 0–45)
BACTERIA BLD CULT: NO GROWTH
BACTERIA BLD CULT: NO GROWTH
BASOPHILS # BLD AUTO: 0 10E3/UL (ref 0–0.2)
BASOPHILS NFR BLD AUTO: 0 %
BILIRUB SERPL-MCNC: 4.6 MG/DL
BUN SERPL-MCNC: 29.1 MG/DL (ref 8–23)
CALCIUM SERPL-MCNC: 8.7 MG/DL (ref 8.8–10.2)
CHLORIDE SERPL-SCNC: 103 MMOL/L (ref 98–107)
CREAT SERPL-MCNC: 1.69 MG/DL (ref 0.67–1.17)
DEPRECATED HCO3 PLAS-SCNC: 19 MMOL/L (ref 22–29)
EGFRCR SERPLBLD CKD-EPI 2021: 41 ML/MIN/1.73M2
EOSINOPHIL # BLD AUTO: 0 10E3/UL (ref 0–0.7)
EOSINOPHIL NFR BLD AUTO: 0 %
ERYTHROCYTE [DISTWIDTH] IN BLOOD BY AUTOMATED COUNT: 19.5 % (ref 10–15)
GLUCOSE BLDC GLUCOMTR-MCNC: 256 MG/DL (ref 70–99)
GLUCOSE BLDC GLUCOMTR-MCNC: 282 MG/DL (ref 70–99)
GLUCOSE BLDC GLUCOMTR-MCNC: 289 MG/DL (ref 70–99)
GLUCOSE BLDC GLUCOMTR-MCNC: 307 MG/DL (ref 70–99)
GLUCOSE BLDC GLUCOMTR-MCNC: 317 MG/DL (ref 70–99)
GLUCOSE BLDC GLUCOMTR-MCNC: 390 MG/DL (ref 70–99)
GLUCOSE SERPL-MCNC: 312 MG/DL (ref 70–99)
HCT VFR BLD AUTO: 31.7 % (ref 40–53)
HGB BLD-MCNC: 10.5 G/DL (ref 13.3–17.7)
IMM GRANULOCYTES # BLD: 0.3 10E3/UL
IMM GRANULOCYTES NFR BLD: 3 %
LYMPHOCYTES # BLD AUTO: 1.2 10E3/UL (ref 0.8–5.3)
LYMPHOCYTES NFR BLD AUTO: 10 %
MCH RBC QN AUTO: 28.6 PG (ref 26.5–33)
MCHC RBC AUTO-ENTMCNC: 33.1 G/DL (ref 31.5–36.5)
MCV RBC AUTO: 86 FL (ref 78–100)
MONOCYTES # BLD AUTO: 0.6 10E3/UL (ref 0–1.3)
MONOCYTES NFR BLD AUTO: 5 %
NEUTROPHILS # BLD AUTO: 9.7 10E3/UL (ref 1.6–8.3)
NEUTROPHILS NFR BLD AUTO: 82 %
NRBC # BLD AUTO: 0 10E3/UL
NRBC BLD AUTO-RTO: 0 /100
PATH REPORT.COMMENTS IMP SPEC: NORMAL
PATH REPORT.COMMENTS IMP SPEC: NORMAL
PATH REPORT.FINAL DX SPEC: NORMAL
PATH REPORT.GROSS SPEC: NORMAL
PATH REPORT.MICROSCOPIC SPEC OTHER STN: NORMAL
PATH REPORT.RELEVANT HX SPEC: NORMAL
PHOTO IMAGE: NORMAL
PLATELET # BLD AUTO: 215 10E3/UL (ref 150–450)
POTASSIUM SERPL-SCNC: 3.9 MMOL/L (ref 3.4–5.3)
PROT SERPL-MCNC: 6.4 G/DL (ref 6.4–8.3)
RBC # BLD AUTO: 3.67 10E6/UL (ref 4.4–5.9)
SODIUM SERPL-SCNC: 138 MMOL/L (ref 135–145)
WBC # BLD AUTO: 11.9 10E3/UL (ref 4–11)

## 2024-05-01 PROCEDURE — 85049 AUTOMATED PLATELET COUNT: CPT | Performed by: INTERNAL MEDICINE

## 2024-05-01 PROCEDURE — 99233 SBSQ HOSP IP/OBS HIGH 50: CPT | Performed by: INTERNAL MEDICINE

## 2024-05-01 PROCEDURE — 99232 SBSQ HOSP IP/OBS MODERATE 35: CPT | Performed by: NURSE PRACTITIONER

## 2024-05-01 PROCEDURE — 80053 COMPREHEN METABOLIC PANEL: CPT | Performed by: INTERNAL MEDICINE

## 2024-05-01 PROCEDURE — 999N000157 HC STATISTIC RCP TIME EA 10 MIN

## 2024-05-01 PROCEDURE — 250N000013 HC RX MED GY IP 250 OP 250 PS 637: Performed by: INTERNAL MEDICINE

## 2024-05-01 PROCEDURE — 250N000012 HC RX MED GY IP 250 OP 636 PS 637: Performed by: INTERNAL MEDICINE

## 2024-05-01 PROCEDURE — 120N000001 HC R&B MED SURG/OB

## 2024-05-01 PROCEDURE — 36415 COLL VENOUS BLD VENIPUNCTURE: CPT | Performed by: INTERNAL MEDICINE

## 2024-05-01 RX ORDER — AMLODIPINE BESYLATE 5 MG/1
5 TABLET ORAL DAILY
Status: DISCONTINUED | OUTPATIENT
Start: 2024-05-02 | End: 2024-05-02 | Stop reason: HOSPADM

## 2024-05-01 RX ORDER — GABAPENTIN 100 MG/1
100 CAPSULE ORAL 3 TIMES DAILY PRN
Status: DISCONTINUED | OUTPATIENT
Start: 2024-05-01 | End: 2024-05-02 | Stop reason: HOSPADM

## 2024-05-01 RX ORDER — HYDROXYZINE HYDROCHLORIDE 25 MG/1
25 TABLET, FILM COATED ORAL 3 TIMES DAILY PRN
Status: DISCONTINUED | OUTPATIENT
Start: 2024-05-01 | End: 2024-05-02 | Stop reason: HOSPADM

## 2024-05-01 RX ADMIN — SODIUM BICARBONATE 650 MG TABLET 1950 MG: at 16:04

## 2024-05-01 RX ADMIN — Medication 10 MG: at 23:04

## 2024-05-01 RX ADMIN — URSOSIOL 300 MG: 300 CAPSULE ORAL at 23:01

## 2024-05-01 RX ADMIN — SODIUM BICARBONATE 650 MG TABLET 1950 MG: at 08:11

## 2024-05-01 RX ADMIN — INSULIN ASPART 3 UNITS: 100 INJECTION, SOLUTION INTRAVENOUS; SUBCUTANEOUS at 18:04

## 2024-05-01 RX ADMIN — ISOSORBIDE MONONITRATE 30 MG: 30 TABLET, EXTENDED RELEASE ORAL at 08:12

## 2024-05-01 RX ADMIN — AMLODIPINE BESYLATE 5 MG: 5 TABLET ORAL at 08:12

## 2024-05-01 RX ADMIN — INSULIN GLARGINE 4 UNITS: 100 INJECTION, SOLUTION SUBCUTANEOUS at 00:59

## 2024-05-01 RX ADMIN — INSULIN ASPART 2 UNITS: 100 INJECTION, SOLUTION INTRAVENOUS; SUBCUTANEOUS at 08:12

## 2024-05-01 RX ADMIN — INSULIN ASPART 2 UNITS: 100 INJECTION, SOLUTION INTRAVENOUS; SUBCUTANEOUS at 12:20

## 2024-05-01 RX ADMIN — SODIUM BICARBONATE 650 MG TABLET 1950 MG: at 23:01

## 2024-05-01 RX ADMIN — URSOSIOL 300 MG: 300 CAPSULE ORAL at 08:11

## 2024-05-01 RX ADMIN — INSULIN GLARGINE 4 UNITS: 100 INJECTION, SOLUTION SUBCUTANEOUS at 22:21

## 2024-05-01 ASSESSMENT — ACTIVITIES OF DAILY LIVING (ADL)
ADLS_ACUITY_SCORE: 25
DEPENDENT_IADLS:: INDEPENDENT
ADLS_ACUITY_SCORE: 25

## 2024-05-01 NOTE — PROGRESS NOTES
GASTROENTEROLOGY PROGRESS NOTE     SUBJECTIVE   The patient reports feeling well. Denies abdominal pain. Believes pruritus is improving.   Reviewed ERCP findings-- the patient believes he has a good understanding. Tolerating clear liquids.   LFTs slightly better. Hgb stable.      OBJECTIVE     Vitals Blood pressure (!) 146/70, pulse 92, temperature 97.6  F (36.4  C), temperature source Oral, resp. rate 18, height 1.829 m (6'), weight 94.7 kg (208 lb 12.8 oz), SpO2 97%.          Physical Exam  General: awake, alert, responds appropriately   Cardiovascular: S1S2, no edema   Chest: lungs are clear    Abdomen: +bs, soft, not tender   Neurologic: grossly intact        LABORATORY    ELECTROLYTE PANEL   Recent Labs   Lab 05/01/24  1206 05/01/24  0703 05/01/24  0650 04/30/24  2304 04/30/24 2156 04/30/24  0757 04/30/24  0619   NA  --  138  --   --  135  --  140   POTASSIUM  --  3.9  --   --  3.5  --  3.8   CHLORIDE  --  103  --   --  100  --  107   CO2  --  19*  --   --  17*  --  20*   * 312* 282*   < > 369*   < > 199*   CR  --  1.69*  --   --  1.59*  --  1.56*   BUN  --  29.1*  --   --  27.9*  --  25.3*    < > = values in this interval not displayed.      HEMATOLOGY PANEL   Recent Labs   Lab 05/01/24 0703 04/30/24 2156 04/28/24 0523 04/27/24  0559 04/26/24  0430   HGB 10.5* 10.5* 11.1*   < > 11.9*   MCV 86 87 87   < > 87   WBC 11.9* 10.1 9.3   < > 11.4*    204 216   < > 253   INR  --   --   --   --  1.11    < > = values in this interval not displayed.      LIVER AND PANCREAS PANEL   Recent Labs   Lab 05/01/24  0703 04/30/24 2156 04/30/24  0619 04/28/24  0523 04/27/24  0559 04/26/24  0430   * 178* 196*   < > 268* 251*   * 257* 277*   < > 301* 320*   ALKPHOS 750* 780* 783*   < > 605* 589*   BILITOTAL 4.6* 6.5* 7.7*   < > 7.1* 5.0*   LIPASE  --  100*  --   --  163* 444*    < > = values in this interval not displayed.     IMAGING STUDIES    EXAM: MR ABDOMEN MRCP W/O and W CONTRAST  LOCATION: [facility]  [facility]  DATE: 4/26/2024     INDICATION: Elevated LFTs.  COMPARISON: 04/26/2024 CT and ultrasound  TECHNIQUE: Routine MR liver/pancreas protocol including axial and coronal MRCP sequences. 2D and 3D reconstruction performed by MR technologist including MIP reconstruction and slab cholangiograms. If performed with contrast, additional dynamic T1 post   IV contrast images.  CONTRAST: 9mL Gadavist      FINDINGS:      MRCP: The gallbladder is distended. There is intrahepatic bile duct dilatation. The common bile duct is dilated to the mid duct, where there is a 15 mm length of nonvisualization in the region of the pancreatic head. The distal common bile duct is small   in caliber. The pancreatic duct is normal in caliber. Multiple small cystic lesions measuring up to 9 mm are along the course of the pancreatic duct.     ADDITIONAL FINDINGS: No definite mass identified in the region of the pancreas. No lymphadenopathy. A few small simple left renal cysts do not require follow-up. There is atherosclerotic disease.                                                                         IMPRESSION:  1.  Bile ducts dilatation and gallbladder distention due to narrowing or occlusion of the common bile duct. The source of narrowing is not identified on this study, though a pancreatic mass is possible. ERCP should be considered if not already performed.  2.  Multiple small cystic lesions along the course of the pancreatic duct. 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 Recommend CT or MRI/MRCP in 6 months.        EXAM: CT ABDOMEN PELVIS W/O CONTRAST  LOCATION: [facility]  DATE: 4/26/2024     INDICATION: Elevated LFTs, lipase.  No significant acute findings found on ultrasound.  COMPARISON: 12/03/2021.  TECHNIQUE: CT scan of the abdomen and pelvis was performed without IV contrast. Multiplanar reformats were obtained. Dose reduction techniques  were used.  CONTRAST: None.     FINDINGS:   LOWER CHEST: There is a 2.1 cm nodule or nodular infiltrate in the left lower lobe on the first image of this exam and not imaged in its entirety.     HEPATOBILIARY: The gallbladder is distended but otherwise appears normal. No calcified gallstone.     PANCREAS: Normal.     SPLEEN: Normal.     ADRENAL GLANDS: Stable small left adrenal gland nodule.     KIDNEYS/BLADDER: No urinary stone or hydronephrosis. Cyst at the upper pole of the left kidney.     BOWEL: Colonic diverticula without acute diverticulitis. No bowel obstruction or inflammation. Moderate amount of stool in the colon. No free intraperitoneal gas or fluid.     LYMPH NODES: Normal.     VASCULATURE: Atherosclerotic calcification of the aorta and its branches. No aneurysm.     PELVIC ORGANS: Probable radiation seeds in the prostate gland.     MUSCULOSKELETAL: Anterior abdominal wall mesh. Small right inguinal hernia containing fat. Spinal stimulator implanted in the subcutaneous fat of the right buttock. Degenerative disease throughout spine.                                                                      IMPRESSION:   1.  Left lower lobe nodule or nodular infiltrate, incompletely imaged on this exam. Follow-up recommended.  2.  No other acute abnormality.  3.  Colonic diverticula without acute diverticulitis. No bowel obstruction or inflammation.     EXAM: US ABDOMEN LIMITED  LOCATION: [facility]  DATE: 4/26/2024     INDICATION: elevated lfts  COMPARISON: None.  TECHNIQUE: Limited abdominal ultrasound.     FINDINGS:     GALLBLADDER: Normal. No gallstones, wall thickening, or pericholecystic fluid. Negative sonographic Garcia's sign.     BILE DUCTS: There is intra and extrahepatic biliary dilatation. No cause of obstruction is seen. The common duct measures 11 mm.     LIVER: Fatty infiltration. No focal mass.     RIGHT KIDNEY: No hydronephrosis.     PANCREAS: The visualized portions  are normal.     No ascites.                                                                      IMPRESSION:  1.  Biliary dilatation without cause of obstruction seen.  2.  Fatty infiltration of the liver.       I have reviewed the current diagnostic and laboratory tests.            EUS 4/30/24--  Findings:       ENDOSONOGRAPHIC FINDING: :        The celiac axis including lymph nodes was unremarkable.        The left adrenal was examined and normal.        The examined portion of the left lobe of the liver and the gastrohepatic        ligament were evaluated and found to be without abnormalities.        The pancreatic parenchyma demonstrated multiple 5-15 mm cysts throughout        the pancreatic parenchyma without any concerning features. The        pancreatic duct measured 3.4 mm in the head, 1.8 mm in the body, and 0.9        mm in the tail.        The ampulla was normal endoscopically and endosonographically. The bile        duct ranged in size from 1.6 mm near the papilla with narrowing and then        up to 13.9 mm proximal to the narrowed thickened bile duct. The        gallbladder dilated and distended without stones or sludge.                                                                                    Impression:            - Narrowing of the mid to lower CBD with thickening of                          the duct.                          - Dilation of the bile duct proximal to the narrowed                          portion.                          - Multiple small cysts throughout the pancreas.   Recommendation:        - Perform an ERCP today to dilate, brush, biopsy, and                          stent the bile duct stricture.                                                                                      Aldo Gongora MD   ERCP 4/30/24  Findings:       The  film was normal. The esophagus was successfully intubated        under direct vision. The scope was advanced to a normal major papilla  in        the descending duodenum without detailed examination of the pharynx,        larynx and associated structures, and upper GI tract. The upper GI tract        was grossly normal. The bile duct was deeply cannulated. Contrast was        injected. The lower third of the main bile duct contained a single        localized stenosis 12 mm in length. The middle third of the main bile        duct and upper third of the main bile duct were diffusely dilated,        secondary to a stricture. The largest diameter was 12 mm. A wire was        passed into the biliary tree. A 7 mm biliary sphincterotomy was made        with a traction (standard) sphincterotome using ERBE electrocautery.        There was no post-sphincterotomy bleeding. Dilation of the common bile        duct with a 6 mm balloon dilator was successful. Cells for cytology were        obtained by brushing in the lower third of the main bile duct. The lower        third of the main bile duct was biopsied with a cold forceps for        histology. One 10 Fr by 5 cm temporary stent with a single external flap        and a single internal flap was placed 5 cm into the common bile duct.        Bile flowed through the stent. The stent was in good position.                                                                                    Moderate Sedation:        GA   Impression:            - A single localized biliary stricture was found in                          the lower third of the main bile duct. The stricture                          was malignant appearing.                          - The upper third of the main bile duct and middle                          third of the main bile duct were dilated, secondary to                          a stricture.                          - A biliary sphincterotomy was performed.                          - Common bile duct was successfully dilated.                          - Cells for cytology obtained in the lower third  of                          the main duct.                          - Biopsy was performed in the lower third of the main                          duct.                          - One temporary stent was placed into the common bile                          duct.   Recommendation:        - No anticoagulation for 72 hours.                          - Clear liquid diet for 24 hours.                          - If pain free after 24 hours advance diet slowly as                          tolerated.                          - Await pathology results.                          - Return patient to referring hospital for ongoing                          care.                          - Repeat ERCP in 10-12 weeks for stent exchange.   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 Biopsies pending-- This 78 yo male presented with rash, jaundice, and RUQ pain. The patient had been on Keflex recently with rash and possible drug-induced liver injury considered. However, abd US and MRCP showed biliary dilation with multiple small cystic lesions along the pancreatic duct. EUS and ERCP 4/30/24 revealed biliary stricture s/p sphincterotomy and temp. Stent placement as above. LFTs are slightly improved. Hgb is stable. Tolerating clear diet.          PLAN   No NSAIDs or anticoagulation for another 48hours.  Ok to advance to full liquids later today.   Will follow LFTs. Follow cytology/biopsy from ERCP and tentatively plan for repeat ERCP in 10-12 weeks per Dr Gongora.   Pruritus seems to be improving- will continue Ursodiol for now and continue to follow LFTs.  If tolerates diet advances, should be able to discharge tomorrow.     Total time spent on this encounter=35minutes.           Marika Camacho PA-C  Thank you for the opportunity to participate in the care of this patient.   Please feel free to call me with any questions or concerns.  Phone number [phone]  [phone].

## 2024-05-01 NOTE — PROGRESS NOTES
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-renal DEAN, reported very low intake and dehydration PTA with ongoing diuretic use, high bili may be contributing to some degree   Baseline sCR in the 1.3-1.6 2021-22, with progressive decline and variability 1.5-2.2 in 2023 (cystatin C/eGFR 2.5/21 compared to 1.6/42 on BMP), standard labs overestimate true degree of renal insufficiently  H/o intermittent microhematuria, UA/micro benign  Historically modest proteinuria by dip, up/c in the 800 range,  no h/o MDS, serum immunofix unremarkable, likely DMN,   CT A/P: no renal stones or hydro, cyst L     Acute on mild chronic Metabolic acidosis:  Improved, Cont oral bicarb for discharge and trend outpt   Lactate initially 3-->1.8 post IVF's  Advancing CKD, retained Hydrogen and reduced ammonia excretion-->worsening acidosis this admit, improving on oral bicarb, with Normal K (low Phos) , may also have mild hypercholremic acidosis with IVF's , 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 Choleduocholithiasis, ?possible panc head mass, ERCP , s/p dilatation and stent placement for CBD stricture, concerning for malig, path pending , GI following      Anemia:  On oral iron PTA   Hgb 10s, not yet needing BUFFY therapy      H/o BPH:  Has followed with urology, last seen 2023 (DR Scales) , prior urolift, flomax dc'd at that visit , voiding ok     Pulm nodule:  As per [facility], plans for outpt w/up, surveillance, in light of concern for biliary malig by ERCP (path pending), consideration for possible metastasis.     SUBJECTIVE:  pt is taking po clears, no issues \"what goes in comes out\", voiding without difficulty, no pain, no n/v/d/c.  Discussed with bedside RN herbert.  Stopping IVF's now that he is taking pos.  We will sign off as we have nothing further to add at this time.     OBJECTIVE:  Physical Exam   Temp: 97.6  F (36.4  C) Temp src: Oral BP: (!) 146/70 Pulse: 92   Resp: 18 SpO2: 97 % O2 Device: None (Room air)    Vitals:    24 0132   Weight: 94.7 kg (208 lb 12.8 oz)     Vital Signs with Ranges  Temp:  [97.6  F (36.4  C)-98.6  F (37  C)] 97.6  F (36.4  C)  Pulse:  [] 92  Resp:  [14-24] 18  BP: (134-156)/(66-89) 146/70  SpO2:  [81 %-99 %] 97 %  I/O last 3 completed shifts:  In: -   Out: 400 [Urine:400]    Temp (24hrs), Av.9  F (36.6  C), Min:97.7  F (36.5  C), Max:98.3  F (36.8  C)      Patient Vitals for the past 72 hrs:   Weight   24 0132 94.7 kg (208 lb 12.8 oz)     Intake/Output Summary (Last 24 hours) at 2024 0936  Last data filed at 2024 0700  Gross per 24 hour   Intake 1240 ml   Output 100 ml   Net 1140 ml       PHYSICAL EXAM:  General - Alert and oriented x3, appears comfortable, NAD, sitting up in bed  Cardiovascular - Rate and sBP controlled  Respiratory - on RA   Abd: BS present, no guarding or pain with palpation, no ascites  Extremities - No obvious " lower extremity edema bilaterally  Skin: dry, intact, no rash, good turgor  Neuro:  Grossly intact, no focal deficits  MSK:  Grossly intact  Psych: flat ffect  :  UO not doc       LABORATORY STUDIES:     Recent Labs   Lab 05/01/24  0703 04/30/24 2156 04/28/24  0523 04/27/24  0559 04/26/24  0430 04/25/24  2329   WBC 11.9* 10.1 9.3 7.8 11.4* 10.6   RBC 3.67* 3.65* 3.83* 3.57* 4.10* 4.42   HGB 10.5* 10.5* 11.1* 10.3* 11.9* 12.7*   HCT 31.7* 31.7* 33.2* 30.7* 35.5* 38.2*    204 216 199 253 252       Basic Metabolic Panel:  Recent Labs   Lab 05/01/24  0703 05/01/24  0650 05/01/24  0139 05/01/24  0050 04/30/24  2304 04/30/24 2156 04/30/24  0757 04/30/24  0619 04/29/24  0639 04/29/24  0625 04/28/24  0638 04/28/24  0523 04/27/24  0655 04/27/24  0559     --   --   --   --  135  --  140  --  139  --  138  --  138   POTASSIUM 3.9  --   --   --   --  3.5  --  3.8  --  4.2  --  4.0  --  4.1   CHLORIDE 103  --   --   --   --  100  --  107  --  109*  --  110*  --  109*   CO2 19*  --   --   --   --  17*  --  20*  --  15*  --  16*  --  18*   BUN 29.1*  --   --   --   --  27.9*  --  25.3*  --  26.2*  --  29.5*  --  35.2*   CR 1.69*  --   --   --   --  1.59*  --  1.56*  --  1.64*  --  1.58*  --  1.72*   * 282* 317* 307* 361* 369*   < > 199*   < > 213*   < > 210*   < > 184*   SHANNAN 8.7*  --   --   --   --  8.6*  --  9.4  --  9.5  --  9.5  --  9.1    < > = values in this interval not displayed.       INR  Recent Labs   Lab 04/26/24  0430   INR 1.11        Recent Labs   Lab Test 05/01/24  0703 04/30/24  2156 04/27/24  0559 04/26/24  0430 06/16/22  0934 12/25/21 2000   INR  --   --   --  1.11  --  1.17*   WBC 11.9* 10.1   < > 11.4*   < > 18.6*   HGB 10.5* 10.5*   < > 11.9*   < > 12.7*    204   < > 253   < > 379    < > = values in this interval not displayed.       Personally reviewed current labs      ABID Mullins-BC  [facility]  [phone]

## 2024-05-01 NOTE — PROGRESS NOTES
Informed of patient arrival -- Was sent to [facility] for ERCP today and he is back to us for treatment continuation    Admitted April 26, 2024 April 30 ERCP @[facility] ---  A single localized biliary stricture was found in  the lower third of the main bile duct. The stricture was malignant appearing. S/P  biliary sphincterotomy, Common bile duct was successfully dilated. One temporary stent was placed into the common bile duct.     Per GI ----- No anticoagulation for 72 hours.Clear liquid diet for 24 hours. If pain free after 24 hours advance diet slowly as tolerated. Repeat ERCP in 10-12 weeks for stent exchange.       Plans  - No discharge readmit orders  per RN --- Will do admitting orders  - Check labs - CBC/CMP  - Will defer to AM doc -- safety to resume -- daily ASA and also SQH   - Several meds on hold due to liver issues - safety to resume per A doc   - Pain meds       Medical issues prior to transfer/ERCP  - Abnormal LFTs and skin rash  - Chronic Renal failure   - Pain with spinal stimulator implant 4/3/2024 at [facility] pain clinic with Dr. Terence Nash.  - DM2 with neuropathy      Will inform AM hospitalist to see patient in AM     NOT a billable encounter

## 2024-05-01 NOTE — PLAN OF CARE
Problem: Diabetes  Goal: Optimal Coping  Outcome: Progressing  Intervention: Support Wellbeing and Self-Management Success  Recent Flowsheet Documentation  Taken 4/30/2024 2320 by Rajani Melendez RN  Supportive Measures:   active listening utilized   self-care encouraged  Goal: Optimal Functional Ability  Outcome: Progressing  Intervention: Optimize Functional Ability  Recent Flowsheet Documentation  Taken 5/1/2024 0132 by Rajani Melendez RN  Assistive Device Utilized: walker  Activity Management: ambulated to bathroom  Activity Assistance Provided: assistance, stand-by  Taken 4/30/2024 2320 by Rajani Melendez RN  Activity Management: activity adjusted per tolerance  Goal: Blood Glucose Level Within Target Range  Outcome: Progressing  Intervention: Optimize Glycemic Control  Recent Flowsheet Documentation  Taken 4/30/2024 2320 by Rajani Melendez RN  Hyperglycemia Management:   blood glucose monitored   correctional insulin given  Goal: Minimize Risk of Hypoglycemia  Outcome: Progressing   Goal Outcome Evaluation:    Patient denies pain overnight.   Up with SBA to bathroom. Took a shower overnight to help with itching.   Remains on clear diet and tolerating well.   IV fluids running at 75ml/hr.   Bed alarm on for patient safety.   Patient reports sleeping poorly overnight despite melatonin administration.   Diabetic teaching continued with patient, patient demonstrates good understanding of self administration of insulin.

## 2024-05-01 NOTE — CONSULTS
Care Management Initial Consult    General Information  Assessment completed with: Patient,    Type of CM/SW Visit: Initial Assessment    Primary Care Provider verified and updated as needed:     Readmission within the last 30 days:        Reason for Consult: discharge planning       Communication Assessment  Patient's communication style: spoken language (English or Bilingual)    Hearing Difficulty or Deaf: no   Wear Glasses or Blind: yes    Cognitive  Cognitive/Neuro/Behavioral: WDL                      Living Environment:   People in home: alone     Current living Arrangements: apartment           Family/Social Support:  Care provided by: self  Provides care for: no one  Marital Status:   Support system: Children          Description of Support System: Supportive, Involved         Current Resources:   Patient receiving home care services: No     Community Resources: None  Equipment currently used at home: walker, rolling  Supplies currently used at home: None      Does the patient's insurance plan have a 3 day qualifying hospital stay waiver?  Yes     Which insurance plan 3 day waiver is available? Alternative insurance waiver    Will the waiver be used for post-acute placement? Undetermined at this time    Lifestyle & Psychosocial Needs:  Social Determinants of Health     Food Insecurity: Not on file   Depression: Not at risk (2/27/2024)    PHQ-2     PHQ-2 Score: 0   Housing Stability: Not on file   Tobacco Use: Medium Risk (4/30/2024)    Patient History     Smoking Tobacco Use: Former     Smokeless Tobacco Use: Never     Passive Exposure: Not on file   Financial Resource Strain: Not on file   Alcohol Use: Not on file   Transportation Needs: Not on file   Physical Activity: Not on file   Interpersonal Safety: Low Risk  (3/27/2024)    Interpersonal Safety     Do you feel physically and emotionally safe where you currently live?: Yes     Within the past 12 months, have you been hit, slapped, kicked or  otherwise physically hurt by someone?: No     Within the past 12 months, have you been humiliated or emotionally abused in other ways by your partner or ex-partner?: No   Stress: Not on file   Social Connections: Not on file   Health Literacy: Not on file       Functional Status:  Prior to admission patient needed assistance:   Dependent ADLs:: Ambulation-walker  Dependent IADLs:: Independent         Additional Information:  10:17 AM  Assessed.  Lives in an apartment alone.  Uses a walker.  Son to transport.    DOUGLAS Hilario

## 2024-05-01 NOTE — PROGRESS NOTES
[facility] Medicine PROGRESS NOTE      Identification/Summary:   Lance Ibrahim is a 79 year old male with PMH of CAD, CHF, stroke, bph, htn, obesity, hyperlipidemia, diabetes who presented with complaints of rash, jaundice, was treated with keflex 3 days prior to onset of rash. GI was consulted and recommended MRCP, which showed common bile duct dilation without finding of stenosis point.  He was also found to have metabolic acidosis with lactic elevation and mild leukocytosis on presentation.  GI recommended ERCP, had ERCP on 4/30 at [facility] and return to [facility].  Found to have biliary stricture which was dilated, stent placed, sphincterotomy was performed, biopsies were sent.  Recommended clear liquid diet for 24 hours and advance diet slowly.    nephrology was consulted for DEAN on CKD with acidosis.  Creatinine has been stable.  Started on sodium bicarb.  Metformin discontinued.     Assessment and Plan:  Rash  Hyperbilirubinemia with direct dominance  S/p ERCP 4/30 with biliary stricture dilation, sphincterotomy, stented, biopsy results pending concern for malignant stricture.  4/3 started Keflex  4/7 rash started  4/17 PCP started prednisone package  4/20 went to ED and got Benadryl  4/26 presented to ED with persistent rash and juandice  There is also elevated lipase.  There etiology of urinary tract obstruction cannot be ruled out.  Acute hepatitis panel pending.  Acetaminophen level less than detectable volume.  Denies bug bites no history of travel.  MRCP showed common bile duct dilatation and intraductal papillary mucinous neoplasm, required 6 months follow-up s/p ERCP 4/30  Follow LFTs  Clear liquid diet for 24 hours then advance slowly  Avoid anticoagulation for 72 hours will need repeat ERCP in near future  Follow-up on biopsy results   On ursodiol    Metabolic acidosis  Did have lactic acidosis which has resolved on admission.  Now symptoms other etiologies are ruled out and RTA is  possible.  -Metformin needs to retire and I believe semaglutide too  -Nephrology consulted on 4/28, bicarb tablet is started  Improving.    Pain with spinal stimulator  It was implanted on 4/3/2024 at [facility] pain clinic with Dr. Terence Nash.  His antibiotic course was aborted because of the rash by himself.  He denies symptoms at the spot of implantation.  Per CT on admission, spinal stimulator implanted in the subcutaneous fat of the right buttock.   -Reached out to Dr. Martin, no more indication for abx     Lactic acidosis -resolved  DEAN versus CKD-improving  Creatinine is 2.0 higher than the previous one but comparable to historic data  pH 7.28, lactic acid 3 > 1.8 with IVF. No anion gap though. Other causes include RTA, DKA with semaglutide (negative beta hydroxybutyric acid)  IV fluid discontinued  - Monitor BMP  - hold torsemide for now  Renal signed off today.     Leukocytosis  Certainly there is risk of infection given scratching and a few open spots on his rash.  Could also be from prednisone use or hepatitis or rash itself.  No fever.  -Follow up on blood culture  -Low threshold to start antibiotics  -Downtrended now after prednisone was stopped  Monitor CBC     Dyspnea  Reported dyspnea on exertion for the past few days that he attributes to lack of sleep  Close monitoring while on IV fluid, stopped     DM neuropathy  Chronic pain  Stimulator placed early this month  He stopped his Keflex when he had the rash.  Will have him contact his clinic for more guidance on antibiotics  Glucose continues to uptrend to a point that he requires insulin now that metformin and semaglutide are contraindicated due to CKD  Will consider glipizide once his diet advances, oral intake improves.     DM2  Sliding scale insulin prn, added meal dose 3u> 0u>2u tid with meals>0, started long acting 6u and just SSI  Typically on metformin hold, semaglutide hold  -Diabetic education with RN  Consider glipizide  Most  recent A1c 8.    Incidental pulmonary nodule/infiltration  Will need outpt surveillance    Diet: Clear Liquid Diet  DVT Prophylaxis:   SCDs with recent ERCP, biopsy.   Code Status: Full Code    Clinically Significant Risk Factors Present on Admission           # Hypercalcemia: corrected calcium is >10.1, will monitor as appropriate    # Hypoalbuminemia: Lowest albumin = 3 g/dL at 4/30/2024  9:56 PM, will monitor as appropriate   # Drug Induced Platelet Defect: home medication list includes an antiplatelet medication   # Hypertension: Noted on problem list     # DMII: A1C = 8.0 % (Ref range: 0.0 - 5.6 %) within past 6 months    # Overweight: Estimated body mass index is 28.32 kg/m  as calculated from the following:    Height as of this encounter: 1.829 m (6').    Weight as of this encounter: 94.7 kg (208 lb 12.8 oz).                 Anticipated possible discharge in 1 days once clinical improvement milestones are met.    Interval History/Subjective:  Tolerating clears and likely can be advanced after 24 hours from ERCP to full liquids and then as tolerated.  Denies any nausea vomiting, abdominal pain only with palpation.  No other shortness of breath, chest pain his main complaint was itching.    Physical Exam/Objective:  Vitals I/O   Vital signs:  Temp: 97.6  F (36.4  C) Temp src: Oral BP: (!) 146/70 Pulse: 92   Resp: 18 SpO2: 97 % O2 Device: None (Room air)   Height: 182.9 cm (6') Weight: 94.7 kg (208 lb 12.8 oz)  Estimated body mass index is 28.32 kg/m  as calculated from the following:    Height as of this encounter: 1.829 m (6').    Weight as of this encounter: 94.7 kg (208 lb 12.8 oz).      I/O last 3 completed shifts:  In: -   Out: 400 [Urine:400]     Body mass index is 28.32 kg/m .    General Appearance:  Alert, cooperative, no distress   Head:  Normocephalic, atraumatic   Eyes:  PERRL    Throat:  mucosa; moist   Neck: No JVD, thyromegaly   Lungs:   Clear to auscultation bilaterally, respirations unlabored    Chest Wall:  No tenderness or deformity   Heart:  Regular rate and rhythm, S1, S2 normal,no murmur   Abdomen:   Soft, non tender, non distended, bowel sounds present, no guarding or rigidity   Extremities: No edema, no joint swelling   Skin: Skin color, texture, turgor normal, no rashes or lesions   Neurologic: Alert and oriented X 3, Moves all 4 extremities       Medications:   Personally Reviewed.  Current Facility-Administered Medications   Medication Dose Route Frequency Provider Last Rate Last Admin    amLODIPine (NORVASC) tablet 5 mg  5 mg Oral Daily Amber Grimes MD   5 mg at 05/01/24 0812    insulin aspart (NovoLOG) injection (RAPID ACTING)  1-3 Units Subcutaneous TID AC Amber Grimes MD   2 Units at 05/01/24 1220    insulin aspart (NovoLOG) injection (RAPID ACTING)  1-3 Units Subcutaneous At Bedtime Amber Grimes MD   2 Units at 04/30/24 2301    insulin glargine (LANTUS PEN) injection 4 Units  4 Units Subcutaneous At Bedtime Amber Grimes MD   4 Units at 05/01/24 0059    isosorbide mononitrate (IMDUR) 24 hr tablet 30 mg  30 mg Oral Daily Amber Grimes MD   30 mg at 05/01/24 0812    sodium bicarbonate tablet 1,950 mg  1,950 mg Oral TID Amber Grimes MD   1,950 mg at 05/01/24 0811    sodium chloride (PF) 0.9% PF flush 3 mL  3 mL Intracatheter Q8H Aldo Gongora MD        sodium chloride (PF) 0.9% PF flush 3 mL  3 mL Intracatheter Q8H Aldo Gongora MD   3 mL at 04/30/24 1834    sodium chloride (PF) 0.9% PF flush 3 mL  3 mL Intracatheter Q8H Amber Grimes MD   3 mL at 04/30/24 2313    ursodiol (ACTIGALL) capsule 300 mg  300 mg Oral BID Amber Grimes MD   300 mg at 05/01/24 0811       Data reviewed today: I personally reviewed all new medications, labs, imaging/diagnostics reports over the past 24 hours. Pertinent findings include     Labs:  Most Recent 3 CBC's:  Recent Labs   Lab Test 05/01/24  0703 04/30/24 2156 04/28/24  0523   WBC 11.9* 10.1 9.3   HGB 10.5*  10.5* 11.1*   MCV 86 87 87    204 216     Most Recent 3 BMP's:  Recent Labs   Lab Test 05/01/24  1206 05/01/24  0703 05/01/24  0650 04/30/24  2304 04/30/24  2156 04/30/24  0757 04/30/24  0619   NA  --  138  --   --  135  --  140   POTASSIUM  --  3.9  --   --  3.5  --  3.8   CHLORIDE  --  103  --   --  100  --  107   CO2  --  19*  --   --  17*  --  20*   BUN  --  29.1*  --   --  27.9*  --  25.3*   CR  --  1.69*  --   --  1.59*  --  1.56*   ANIONGAP  --  16*  --   --  18*  --  13   SHANNAN  --  8.7*  --   --  8.6*  --  9.4   * 312* 282*   < > 369*   < > 199*    < > = values in this interval not displayed.     Most Recent 2 LFT's:  Recent Labs   Lab Test 05/01/24  0703 04/30/24  2156   * 178*   * 257*   ALKPHOS 750* 780*   BILITOTAL 4.6* 6.5*     Most Recent 6 glucoses:  Recent Labs   Lab Test 05/01/24  1206 05/01/24  0703 05/01/24  0650 05/01/24  0139 05/01/24  0050 04/30/24  2304   * 312* 282* 317* 307* 361*       Imaging:   Recent Results (from the past 24 hour(s))   XR Surgery CANDELARIA Fluoro L/T 5 Min    Narrative    EXAM: XR SURGERY CANDELARIA FLUORO LESS THAN 5 MIN  LOCATION: [facility]  DATE: 4/30/2024    INDICATION: DILATED BILE DUCT, elevated liver function tests  COMPARISON: MR and CT exams 4/26/2024    TECHNIQUE: Intraoperative fluoroscopy performed during the patient's procedure.    FLUOROSCOPIC TIME: 2.54 minutes  NUMBER OF IMAGES: 5    FINDINGS: Initial images show opacification of the mildly dilated intra and extra hepatic biliary ducts. There is a stricture in the distal common bile duct with abrupt cut off. No discrete suspicious intraductal filling defect. A distal common bile duct   stent was placed.        55 MINUTES SPENT BY ME on the date of service doing chart review, history, exam, documentation & further activities per the note.    Isabelle Vidal MD  Hospitalist  [facility]

## 2024-05-02 VITALS
OXYGEN SATURATION: 93 % | HEART RATE: 87 BPM | HEIGHT: 72 IN | SYSTOLIC BLOOD PRESSURE: 146 MMHG | DIASTOLIC BLOOD PRESSURE: 73 MMHG | WEIGHT: 209 LBS | RESPIRATION RATE: 16 BRPM | TEMPERATURE: 98.1 F | BODY MASS INDEX: 28.31 KG/M2

## 2024-05-02 LAB
ALBUMIN SERPL BCG-MCNC: 3.2 G/DL (ref 3.5–5.2)
ALP SERPL-CCNC: 648 U/L (ref 40–150)
ALT SERPL W P-5'-P-CCNC: 204 U/L (ref 0–70)
ANION GAP SERPL CALCULATED.3IONS-SCNC: 11 MMOL/L (ref 7–15)
AST SERPL W P-5'-P-CCNC: 134 U/L (ref 0–45)
BILIRUB SERPL-MCNC: 3.3 MG/DL
BUN SERPL-MCNC: 25.7 MG/DL (ref 8–23)
CALCIUM SERPL-MCNC: 8.6 MG/DL (ref 8.8–10.2)
CHLORIDE SERPL-SCNC: 103 MMOL/L (ref 98–107)
CREAT SERPL-MCNC: 1.53 MG/DL (ref 0.67–1.17)
DEPRECATED HCO3 PLAS-SCNC: 23 MMOL/L (ref 22–29)
EGFRCR SERPLBLD CKD-EPI 2021: 46 ML/MIN/1.73M2
ERYTHROCYTE [DISTWIDTH] IN BLOOD BY AUTOMATED COUNT: 19.4 % (ref 10–15)
GLUCOSE BLDC GLUCOMTR-MCNC: 195 MG/DL (ref 70–99)
GLUCOSE BLDC GLUCOMTR-MCNC: 207 MG/DL (ref 70–99)
GLUCOSE BLDC GLUCOMTR-MCNC: 257 MG/DL (ref 70–99)
GLUCOSE SERPL-MCNC: 210 MG/DL (ref 70–99)
HCT VFR BLD AUTO: 30.7 % (ref 40–53)
HGB BLD-MCNC: 10.1 G/DL (ref 13.3–17.7)
MCH RBC QN AUTO: 28.9 PG (ref 26.5–33)
MCHC RBC AUTO-ENTMCNC: 32.9 G/DL (ref 31.5–36.5)
MCV RBC AUTO: 88 FL (ref 78–100)
PATH REPORT.COMMENTS IMP SPEC: NORMAL
PATH REPORT.FINAL DX SPEC: NORMAL
PATH REPORT.GROSS SPEC: NORMAL
PATH REPORT.MICROSCOPIC SPEC OTHER STN: NORMAL
PLATELET # BLD AUTO: 207 10E3/UL (ref 150–450)
POTASSIUM SERPL-SCNC: 3.6 MMOL/L (ref 3.4–5.3)
PROT SERPL-MCNC: 6.3 G/DL (ref 6.4–8.3)
RBC # BLD AUTO: 3.49 10E6/UL (ref 4.4–5.9)
SODIUM SERPL-SCNC: 137 MMOL/L (ref 135–145)
WBC # BLD AUTO: 10.2 10E3/UL (ref 4–11)

## 2024-05-02 PROCEDURE — 250N000013 HC RX MED GY IP 250 OP 250 PS 637: Performed by: INTERNAL MEDICINE

## 2024-05-02 PROCEDURE — 36415 COLL VENOUS BLD VENIPUNCTURE: CPT | Performed by: INTERNAL MEDICINE

## 2024-05-02 PROCEDURE — 80053 COMPREHEN METABOLIC PANEL: CPT | Performed by: INTERNAL MEDICINE

## 2024-05-02 PROCEDURE — 99239 HOSP IP/OBS DSCHRG MGMT >30: CPT | Performed by: INTERNAL MEDICINE

## 2024-05-02 PROCEDURE — 85027 COMPLETE CBC AUTOMATED: CPT | Performed by: INTERNAL MEDICINE

## 2024-05-02 RX ORDER — URSODIOL 300 MG/1
300 CAPSULE ORAL 2 TIMES DAILY
Qty: 60 CAPSULE | Refills: 0 | Status: SHIPPED | OUTPATIENT
Start: 2024-05-02

## 2024-05-02 RX ORDER — GABAPENTIN 300 MG/1
300 CAPSULE ORAL 3 TIMES DAILY PRN
Status: ON HOLD
Start: 2024-05-02 | End: 2024-06-24

## 2024-05-02 RX ORDER — GLIPIZIDE 2.5 MG/1
2.5 TABLET, EXTENDED RELEASE ORAL DAILY
Qty: 30 TABLET | Refills: 0 | Status: ON HOLD | OUTPATIENT
Start: 2024-05-02 | End: 2024-05-22

## 2024-05-02 RX ORDER — ASPIRIN 81 MG/1
81 TABLET ORAL DAILY
Status: ON HOLD
Start: 2024-05-04 | End: 2024-08-16

## 2024-05-02 RX ADMIN — INSULIN ASPART 2 UNITS: 100 INJECTION, SOLUTION INTRAVENOUS; SUBCUTANEOUS at 11:36

## 2024-05-02 RX ADMIN — SODIUM BICARBONATE 650 MG TABLET 1950 MG: at 08:00

## 2024-05-02 RX ADMIN — ISOSORBIDE MONONITRATE 30 MG: 30 TABLET, EXTENDED RELEASE ORAL at 08:00

## 2024-05-02 RX ADMIN — AMLODIPINE BESYLATE 5 MG: 5 TABLET ORAL at 08:00

## 2024-05-02 RX ADMIN — INSULIN ASPART 1 UNITS: 100 INJECTION, SOLUTION INTRAVENOUS; SUBCUTANEOUS at 07:51

## 2024-05-02 RX ADMIN — URSOSIOL 300 MG: 300 CAPSULE ORAL at 08:00

## 2024-05-02 ASSESSMENT — ACTIVITIES OF DAILY LIVING (ADL)
ADLS_ACUITY_SCORE: 23
ADLS_ACUITY_SCORE: 25
ADLS_ACUITY_SCORE: 23
ADLS_ACUITY_SCORE: 25
ADLS_ACUITY_SCORE: 23
ADLS_ACUITY_SCORE: 25
ADLS_ACUITY_SCORE: 25

## 2024-05-02 NOTE — PLAN OF CARE
Problem: Adult Inpatient Plan of Care  Goal: Patient-Specific Goal (Individualized)    Outcome: Progressing   Goal Outcome Evaluation:    Problem: Diabetes  Goal: Optimal Coping  Outcome: Progressing  Intervention: Support Wellbeing and Self-Management Success  Recent Flowsheet Documentation  Taken 5/2/2024 0303 by Rajani Melendez RN  Supportive Measures:   active listening utilized   self-care encouraged  Taken 5/1/2024 2257 by Rajani Melendez RN  Supportive Measures:   active listening utilized   self-care encouraged  Goal: Optimal Functional Ability  Outcome: Progressing  Intervention: Optimize Functional Ability  Recent Flowsheet Documentation  Taken 5/2/2024 0303 by Rajani Melendez RN  Activity Management: activity adjusted per tolerance  Taken 5/1/2024 2257 by Rajani Melendez RN  Activity Management: activity adjusted per tolerance  Goal: Blood Glucose Level Within Target Range  Outcome: Progressing  Intervention: Optimize Glycemic Control  Recent Flowsheet Documentation  Taken 5/2/2024 0303 by Rajani Melendez RN  Hyperglycemia Management:   blood glucose monitored   correctional insulin given  Taken 5/1/2024 2257 by Rajani Melendez RN  Hyperglycemia Management:   blood glucose monitored   correctional insulin given  Goal: Minimize Risk of Hypoglycemia  Outcome: Progressing    Diabetic education continued with patient.   BS overnight 256 and 207  PIV SL.   Patient on full diet. Reporting some abdominal discomfort and bloating.   Itching continues.   PRN melatonin given for sleep. Patient still slept very little overnight per report.   Ambulating in room independently.

## 2024-05-02 NOTE — PROGRESS NOTES
"Escorted to entrance via wheelchair and transported home by son.  Patient said,\"He will  medications at [facility] pharmacy as he will be going there this afternoon.   "

## 2024-05-02 NOTE — PROGRESS NOTES
Care Management Discharge Note    Discharge Date: 05/02/2024       Discharge Disposition: Home    Discharge Services: None    Discharge DME: None    Discharge Transportation: family or friend will provide    Private pay costs discussed: Not applicable      Education Provided on the Discharge Plan:  yes  Persons Notified of Discharge Plans: pt  Patient/Family in Agreement with the Plan: yes    Handoff Referral Completed: Yes    Additional Information:  10:30 AM  Pt medically ready to discharge home with no needs.  Family to transport home.    DOUGLAS Hilario

## 2024-05-02 NOTE — DISCHARGE SUMMARY
[facility] MEDICINE  DISCHARGE SUMMARY     Primary Care Physician: Amber Grimes  Admission Date: 4/30/2024   Discharge Provider: Estee Ortiz Discharge Date: 5/2/2024   Diet:   Active Diet and Nourishment Order   Procedures    Combination Diet Moderate Consistent Carb (60 g CHO per Meal) Diet; Low Saturated Fat Diet       Code Status: Full Code   Activity: DCACTIVITY: Activity as tolerated        Condition at Discharge: Stable     REASON FOR PRESENTATION(See Admission Note for Details)   Rash, jaundice     PRINCIPAL & ACTIVE DISCHARGE DIAGNOSES     Active Problems:  Biliary obstruction with strictures s/p EUS ERCP 4/30/2024 with sphincterotomy, stent placement, biopsy results pending  Generalized pruritus likely related to liver disease  Diabetes mellitus type 2  DEAN on CKD 3  Hypertension  Diabetic neuropathy  Non-anion gap metabolic acidosis  Spinal stimulator in place  Left lower lobe 2.1 cm nodule, will need follow-up imaging as outpatient     PENDING LABS     Unresulted Labs Ordered in the Past 30 Days of this Admission       Date and Time Order Name Status Description    4/30/2024  3:08 PM Cytology, non-gynecologic In process               PROCEDURES ( this hospitalization only)      ERCP  - A single localized biliary stricture was found in                          the lower third of the main bile duct. The stricture                          was malignant appearing.                          - The upper third of the main bile duct and middle                          third of the main bile duct were dilated, secondary to                          a stricture.                          - A biliary sphincterotomy was performed.                          - Common bile duct was successfully dilated.                          - Cells for cytology obtained in the lower third of                          the main duct.                          - Biopsy was performed in the lower  third of the main                          duct.                          - One temporary stent was placed into the common bile                          duct.   Recommendation:        - No anticoagulation for 72 hours.                          - Clear liquid diet for 24 hours.                          - If pain free after 24 hours advance diet slowly as                          tolerated.                          - Await pathology results.                          - Return patient to referring hospital for ongoing                          care.                          - Repeat ERCP in 10-12 weeks for stent exchange.     RECOMMENDATIONS TO OUTPATIENT PROVIDER FOR F/U VISIT     Follow up appointment scheduled with nephrology on 5/6/24.   Follow up with [facility], repeat ERCP in 10-12 weeks.     DISPOSITION     Home    SUMMARY OF HOSPITAL COURSE:      Patient is an 79 year old male with a PMH of stroke, CAD, CHF, BPH, HTN, obesity, HLD, and T2DM admitted to [facility] on 4/26/24 with rash and jaundice. He had received keflex on 4/3/24 after having a spinal stimulator implantation by Dr. Nash and developed a rash on 4/7. He was given prednisone by his PCP on 4/17 then went to the ED three days later and received benadryl. On 4/26 he presented at  ED with continued rash and jaundice.   GI was consulted and an MRCP was performed showing common bile duct dilation without stenosis. Workup revealed metabolic acidosis, elevated lactic, and mild leukocytosis. Leukocytosis resolved with discontinuation of prednisone. GI then recommended an ERCP which was completed at [facility] on 4/30 and revealed a biliary structure, structure was dilated, a stent was placed, sphincterotomy was performed, and biopsies were taken with concern for malignancy. Follow up on biopsy results. Tolerating regular diet. Recommendations given from GI to continue ursodiol at discharge and monitor LFTs.   Nephrology was consulted for DEAN on chronic  kidney disease with acidosis. Creatinine has been stable, last Cr was 1.53. He received sodium bicarb and metformin was discontinued. Acidosis has resolved and CKD returned to baseline. Recommend continuing sodium bicarb 1950 mg TID for discharge. Follow up appointment scheduled with nephrology on 5/6/24.   For diabetes, metformin discontinued with CKD, acidosis, started on glipizide.     Discharge Medications with Med changes:     START taking         Dose / Directions   glipiZIDE 2.5 MG 24 hr tablet  Commonly known as: GLUCOTROL XL  Used for: Diabetic mononeuropathy associated with type 2 diabetes mellitus (H)       Dose: 2.5 mg  Take 1 tablet (2.5 mg) by mouth daily  Quantity: 30 tablet  Refills: 0      ursodiol 300 MG capsule  Commonly known as: ACTIGALL  Used for: Biliary stricture (H28)       Dose: 300 mg  Take 1 capsule (300 mg) by mouth 2 times daily  Quantity: 60 capsule  Refills: 0                CONTINUE these medicines which may have CHANGED, or have new prescriptions. If we are uncertain of the size of tablets/capsules you have at home, strength may be listed as something that might have changed.         Dose / Directions   aspirin 81 MG EC tablet  This may have changed: These instructions start on May 4, 2024. If you are unsure what to do until then, ask your doctor or other care provider.  Used for: Coronary artery disease involving native coronary artery of native heart without angina pectoris       Dose: 81 mg  Start taking on: May 4, 2024  Take 1 tablet (81 mg) by mouth daily  Refills: 0      gabapentin 300 MG capsule  Commonly known as: NEURONTIN  This may have changed: how much to take  Used for: Diabetic mononeuropathy associated with type 2 diabetes mellitus (H)       Dose: 300 mg  Take 1 capsule (300 mg) by mouth 3 times daily as needed  Refills: 0                CONTINUE these medicines which have NOT CHANGED         Dose / Directions   amLODIPine 5 MG tablet  Commonly known as: NORVASC  Used  for: Essential (primary) hypertension       Dose: 5 mg  TAKE 1 TABLET DAILY  Quantity: 90 tablet  Refills: 3      blood glucose monitoring meter device kit  Commonly known as: NO BRAND SPECIFIED  Used for: Type 2 diabetes mellitus with peripheral vascular disease (H)       Use to test blood sugar 2 times daily or as directed.  Quantity: 1 kit  Refills: 0      colchicine 0.6 MG tablet  Commonly known as: COLCRYS  Used for: Gouty arthropathy       TAKE 1 TABLET DAILY  Quantity: 90 tablet  Refills: 3      Contour Next Test test strip  Used for: Type 2 diabetes mellitus with peripheral vascular disease (H)  Generic drug: blood glucose       USE TO TEST BLOOD SUGAR 2 TIMES DAILY OR AS DIRECTED.  Quantity: 100 strip  Refills: 0      diclofenac 1 % topical gel  Commonly known as: VOLTAREN       Dose: 4 g  Apply 4 g topically 4 times daily as needed for moderate pain  Refills: 0      emollient external cream  Used for: Rash       Apply topically 3 times daily  Quantity: 113 g  Refills: 3      famotidine 20 MG tablet  Commonly known as: PEPCID       Dose: 20 mg  Take 1 tablet (20 mg) by mouth 2 times daily as needed (itching)  Quantity: 30 tablet  Refills: 0      Ferrous Gluconate 240 (27 Fe) MG Tabs       Dose: 1 tablet  Take 1 tablet by mouth daily  Refills: 0      fluticasone 50 MCG/ACT nasal spray  Commonly known as: FLONASE       Dose: 1-2 spray  Spray 1-2 sprays in nostril daily as needed  Refills: 0      Fortify 30 Billion Probiot 50+ Cpdr  Used for: Abdominal bloating       Dose: 1 5 Pack  Take 1 5 Pack by mouth daily  Quantity: 90 capsule  Refills: 1      HEMP OIL OR EXTRACT OR OTHER CBD CANNABINOID (NOT MEDICAL CANNABIS)       Dose: 1 chew tab  Take 1 chew tab by mouth at bedtime CBD gummy  Refills: 0      hydrOXYzine HCl 25 MG tablet  Commonly known as: ATARAX       Dose: 25 mg  Take 1 tablet (25 mg) by mouth 3 times daily as needed for itching  Quantity: 20 tablet  Refills: 0      isosorbide mononitrate 30 MG 24  hr tablet  Commonly known as: IMDUR  Used for: Dyspnea on exertion, Chest pain, unspecified type, Cardiomyopathy, unspecified type (H)       Dose: 30 mg  TAKE 1 TABLET DAILY  Quantity: 90 tablet  Refills: 0      Lidocaine 4 % Patch  Commonly known as: LIDOCARE       Dose: 1 patch  Place 1 patch onto the skin daily as needed To prevent lidocaine toxicity, patient should be patch free for 12 hrs daily.  Refills: 0      lisinopril 10 MG tablet  Commonly known as: ZESTRIL  Used for: Chronic diastolic heart failure (H)       Dose: 10 mg  Take 1 tablet (10 mg) by mouth every 24 hours  Quantity: 90 tablet  Refills: 3      loratadine 10 MG tablet  Commonly known as: CLARITIN       Dose: 10 mg  Take 10 mg by mouth daily as needed for allergies  Refills: 0      melatonin 5 MG Caps       Dose: 5 mg  Take 5 mg by mouth at bedtime  Refills: 0      Microlet Lancets Misc  Used for: Type 2 diabetes, HbA1c goal < 7% (H)       USE TO TEST BLOOD SUGAR TWICE A DAY OR AS DIRECTED  Quantity: 100 each  Refills: 1      multivitamin w/minerals tablet       Dose: 1 tablet  Take 1 tablet by mouth daily  Refills: 0      nortriptyline 10 MG capsule  Commonly known as: PAMELOR       Dose: 20 mg  Take 20 mg by mouth nightly as needed  Refills: 0      Semaglutide 14 MG tablet  Commonly known as: RYBELSUS  Used for: Type 2 diabetes, HbA1c goal < 7% (H)       Dose: 14 mg  Take 14 mg by mouth daily  Quantity: 30 tablet  Refills: 3      sodium bicarbonate 650 MG tablet  Used for: Acidosis       Dose: 1,950 mg  Take 3 tablets (1,950 mg) by mouth 3 times daily  Quantity: 180 tablet  Refills: 1      torsemide 20 MG tablet  Commonly known as: DEMADEX  Used for: Essential hypertension       Dose: 20 mg  TAKE 1 TABLET DAILY  Quantity: 90 tablet  Refills: 3      Tums 500 MG chewable tablet  Generic drug: calcium carbonate       Dose: 1,000 mg  Take 1,000 mg by mouth 3 times daily as needed for heartburn  Refills: 0                STOP taking        atorvastatin 40 MG tablet  Commonly known as: LIPITOR              Rationale for medication changes:      Discontinue metformin due to CKD. Started on glipizide.   Statin help because of LFT's elevation         Consults     GASTROENTEROLOGY IP CONSULT  NEPHROLOGY IP CONSULT  CARE MANAGEMENT / SOCIAL WORK IP CONSULT       Anticoagulation Information      Recent INR results:   Recent Labs   Lab 04/26/24  0430   INR 1.11         SIGNIFICANT IMAGING FINDINGS     No results found for this visit on 04/30/24.    SIGNIFICANT LABORATORY FINDINGS     Last Comprehensive Metabolic Panel:  Sodium   Date Value Ref Range Status   05/02/2024 137 135 - 145 mmol/L Final     Comment:     Reference intervals for this test were updated on 09/26/2023 to more accurately reflect our healthy population. There may be differences in the flagging of prior results with similar values performed with this method. Interpretation of those prior results can be made in the context of the updated reference intervals.    04/29/2021 139 133 - 144 mmol/L Final     Potassium   Date Value Ref Range Status   05/02/2024 3.6 3.4 - 5.3 mmol/L Final   08/27/2022 4.6 3.5 - 5.0 mmol/L Final   04/29/2021 4.7 3.4 - 5.3 mmol/L Final     Chloride   Date Value Ref Range Status   05/02/2024 103 98 - 107 mmol/L Final   08/27/2022 108 (H) 98 - 107 mmol/L Final   04/29/2021 107 94 - 109 mmol/L Final     Carbon Dioxide   Date Value Ref Range Status   04/29/2021 27 20 - 32 mmol/L Final     Carbon Dioxide (CO2)   Date Value Ref Range Status   05/02/2024 23 22 - 29 mmol/L Final   08/27/2022 22 22 - 31 mmol/L Final     Anion Gap   Date Value Ref Range Status   05/02/2024 11 7 - 15 mmol/L Final   08/27/2022 8 5 - 18 mmol/L Final   04/29/2021 5 3 - 14 mmol/L Final     Glucose   Date Value Ref Range Status   05/02/2024 210 (H) 70 - 99 mg/dL Final   08/27/2022 131 (H) 70 - 125 mg/dL Final   04/29/2021 212 (H) 70 - 99 mg/dL Final     GLUCOSE BY METER POCT   Date Value Ref Range  Status   05/02/2024 195 (H) 70 - 99 mg/dL Final     Urea Nitrogen   Date Value Ref Range Status   05/02/2024 25.7 (H) 8.0 - 23.0 mg/dL Final   08/27/2022 16 8 - 28 mg/dL Final   04/29/2021 27 7 - 30 mg/dL Final     Creatinine   Date Value Ref Range Status   05/02/2024 1.53 (H) 0.67 - 1.17 mg/dL Final   04/29/2021 1.19 0.66 - 1.25 mg/dL Final     GFR Estimate   Date Value Ref Range Status   05/02/2024 46 (L) >60 mL/min/1.73m2 Final   04/29/2021 59 (L) >60 mL/min/[1.73_m2] Final     Comment:     Non  GFR Calc  Starting 12/18/2018, serum creatinine based estimated GFR (eGFR) will be   calculated using the Chronic Kidney Disease Epidemiology Collaboration   (CKD-EPI) equation.       GFR, ESTIMATED POCT   Date Value Ref Range Status   12/03/2021 15 (L) >60 mL/min/1.73m2 Final     Calcium   Date Value Ref Range Status   05/02/2024 8.6 (L) 8.8 - 10.2 mg/dL Final   04/29/2021 9.3 8.5 - 10.1 mg/dL Final     Bilirubin Total   Date Value Ref Range Status   05/02/2024 3.3 (H) <=1.2 mg/dL Final   07/28/2020 0.3 0.2 - 1.3 mg/dL Final     Alkaline Phosphatase   Date Value Ref Range Status   05/02/2024 648 (H) 40 - 150 U/L Final     Comment:     Reference intervals for this test were updated on 11/14/2023 to more accurately reflect our healthy population. There may be differences in the flagging of prior results with similar values performed with this method. Interpretation of those prior results can be made in the context of the updated reference intervals.   07/28/2020 87 40 - 150 U/L Final     ALT   Date Value Ref Range Status   05/02/2024 204 (H) 0 - 70 U/L Final     Comment:     Reference intervals for this test were updated on 6/12/2023 to more accurately reflect our healthy population. There may be differences in the flagging of prior results with similar values performed with this method. Interpretation of those prior results can be made in the context of the updated reference intervals.     04/29/2021 41 0  - 70 U/L Final     AST   Date Value Ref Range Status   05/02/2024 134 (H) 0 - 45 U/L Final     Comment:     Reference intervals for this test were updated on 6/12/2023 to more accurately reflect our healthy population. There may be differences in the flagging of prior results with similar values performed with this method. Interpretation of those prior results can be made in the context of the updated reference intervals.   07/28/2020 17 0 - 45 U/L Final     CBC RESULTS:   Recent Labs   Lab Test 05/02/24  0623   WBC 10.2   RBC 3.49*   HGB 10.1*   HCT 30.7*   MCV 88   MCH 28.9   MCHC 32.9   RDW 19.4*          Discharge Orders     No discharge procedures on file.    Examination   Physical Exam   Temp:  [98  F (36.7  C)-98.1  F (36.7  C)] 98.1  F (36.7  C)  Pulse:  [84-87] 87  Resp:  [16-18] 16  BP: (146-153)/(73) 146/73  SpO2:  [93 %-98 %] 93 %  Wt Readings from Last 1 Encounters:   05/02/24 94.8 kg (209 lb)     Physical Exam  Constitutional:       General: He is not in acute distress.     Appearance: Normal appearance.   HENT:      Head: Normocephalic and atraumatic.   Cardiovascular:      Rate and Rhythm: Normal rate and regular rhythm.      Pulses: Normal pulses.      Heart sounds: Normal heart sounds.   Pulmonary:      Effort: Pulmonary effort is normal.      Breath sounds: Normal breath sounds.   Skin:     General: Skin is warm and dry.      Comments: Diffuse papular rash on torso and extremities with scabs.    Neurological:      Mental Status: He is alert.   Psychiatric:         Mood and Affect: Mood normal.         Behavior: Behavior normal.         Please see EMR for more detailed significant labs, imaging, consultant notes etc.    IIsabelle MD, personally saw the patient today and spent greater than 30 minutes discharging this patient.    Estee Ortiz  [facility]    CC:Amber Grimes

## 2024-05-02 NOTE — PLAN OF CARE
"  Problem: Risk for Delirium  Goal: Improved Attention and Thought Clarity  Intervention: Maximize Cognitive Function  Recent Flowsheet Documentation  Taken 5/2/2024 0742 by Odin Muhammad RN  Sensory Stimulation Regulation:   lighting decreased   quiet environment promoted  Reorientation Measures:   calendar in view   clock in view   glasses use encouraged     Problem: Risk for Delirium  Goal: Improved Behavioral Control  Intervention: Minimize Safety Risk  Recent Flowsheet Documentation  Taken 5/2/2024 0742 by Odin Muhammad RN  Communication Enhancement Strategies: call light answered in person  Enhanced Safety Measures:   room near unit station   review medications for side effects with activity     Problem: Traumatic Brain Injury  Goal: Fluid and Electrolyte Balance  Intervention: Monitor and Manage Fluid and Electrolyte Balance  Recent Flowsheet Documentation  Taken 5/2/2024 0742 by Odin Muhammad RN  Fluid/Electrolyte Management: fluids provided     Problem: Pain Chronic (Persistent)  Goal: Optimal Pain Control and Function  Intervention: Manage Persistent Pain  Recent Flowsheet Documentation  Taken 5/2/2024 0742 by Odin Muhammad RN  Medication Review/Management: medications reviewed   Goal Outcome Evaluation:    Patient alert and oriented x 4, speech clear. Lungs clear no SOB. On room air. PIV SL and flushed and green capped. Last BM 4/30/24. Voiding without difficulty. Ambulates with rolling walker independent. Able to get in and out of bed independently. Patient tolerating 60 CHO diet, no nausea, vomiting or diarrhea.   Scabbed rash on entire body. AC/HS.  Patient able to administer insulin via pens correctly.  Patient reports,\"Feeling comfortable with carb counting, insulin administration and accucheck, has own machine at home. \"  Reviewed discharge AVS with patient and changed pharmacy to [facility] in [place], faxed MD or change from [facility] to [facility] and to refax " medications. Patient acknowledged information. GI seen patient and cleared to discharge.  PIV discontinued.  Personal belonging gathered.  Son will transport home between noon-4 pm.

## 2024-05-03 ENCOUNTER — PATIENT OUTREACH (OUTPATIENT)
Dept: CARE COORDINATION | Facility: CLINIC | Age: 79
End: 2024-05-03
Payer: COMMERCIAL

## 2024-05-03 ENCOUNTER — HOSPITAL ENCOUNTER (OUTPATIENT)
Facility: HOSPITAL | Age: 79
End: 2024-05-03
Attending: INTERNAL MEDICINE | Admitting: INTERNAL MEDICINE
Payer: COMMERCIAL

## 2024-05-03 NOTE — PROGRESS NOTES
Clinic Care Coordination Contact  [facility]: Post-Discharge Note  SITUATION                                                      Admission:    Admission Date: 04/30/24   Reason for Admission: Abdominal pain  Discharge:   Discharge Date: 05/02/24  Discharge Diagnosis: Abdominal pain    BACKGROUND                                                      Per hospital discharge summary and inpatient provider notes:  Patient is an 79 year old male with a PMH of stroke, CAD, CHF, BPH, HTN, obesity, HLD, and T2DM admitted to [facility] on 4/26/24 with rash and jaundice. He had received keflex on 4/3/24 after having a spinal stimulator implantation by Dr. Nash and developed a rash on 4/7. He was given prednisone by his PCP on 4/17 then went to the ED three days later and received benadryl. On 4/26 he presented at  ED with continued rash and jaundice.   GI was consulted and an MRCP was performed showing common bile duct dilation without stenosis. Workup revealed metabolic acidosis, elevated lactic, and mild leukocytosis. Leukocytosis resolved with discontinuation of prednisone. GI then recommended an ERCP which was completed at [facility] on 4/30 and revealed a biliary structure, structure was dilated, a stent was placed, sphincterotomy was performed, and biopsies were taken with concern for malignancy. Follow up on biopsy results. Tolerating regular diet. Recommendations given from GI to continue ursodiol at discharge and monitor LFTs.   Nephrology was consulted for DEAN on chronic kidney disease with acidosis. Creatinine has been stable, last Cr was 1.53. He received sodium bicarb and metformin was discontinued. Acidosis has resolved and CKD returned to baseline. Recommend continuing sodium bicarb 1950 mg TID for discharge. Follow up appointment scheduled with nephrology on 5/6/24.   For diabetes, metformin discontinued with CKD, acidosis, started on glipizide.     ASSESSMENT           Discharge Assessment  How  are you doing now that you are home?: pretty well  How are your symptoms? (Red Flag symptoms escalate to triage hotline per guidelines): Improved  Do you feel your condition is stable enough to be safe at home until your provider visit?: Yes  Does the patient have their discharge instructions? : Yes  Does the patient have questions regarding their discharge instructions? : No  Were you started on any new medications or were there changes to any of your previous medications? : Yes  Does the patient have all of their medications?: Yes  Do you have questions regarding any of your medications? : No  Do you have all of your needed medical supplies or equipment (DME)?  (i.e. oxygen tank, CPAP, cane, etc.): Yes  Discharge follow-up appointment scheduled within 14 calendar days? : Yes                  PLAN                                                      Outpatient Plan:  Follow up appointment scheduled with nephrology on 5/6/24.   Follow up with [facility], repeat ERCP in 10-12 weeks.     No future appointments.      For any urgent concerns, please contact our 24 hour nurse triage line: [phone] ([phone])         MAGALY Booker

## 2024-05-08 ENCOUNTER — OFFICE VISIT (OUTPATIENT)
Dept: FAMILY MEDICINE | Facility: CLINIC | Age: 79
End: 2024-05-08
Payer: COMMERCIAL

## 2024-05-08 VITALS
BODY MASS INDEX: 29.66 KG/M2 | DIASTOLIC BLOOD PRESSURE: 78 MMHG | HEIGHT: 72 IN | OXYGEN SATURATION: 98 % | WEIGHT: 219 LBS | SYSTOLIC BLOOD PRESSURE: 134 MMHG | TEMPERATURE: 98 F | RESPIRATION RATE: 18 BRPM | HEART RATE: 99 BPM

## 2024-05-08 DIAGNOSIS — Z01.818 PREOP GENERAL PHYSICAL EXAM: Primary | ICD-10-CM

## 2024-05-08 PROCEDURE — 99214 OFFICE O/P EST MOD 30 MIN: CPT | Performed by: FAMILY MEDICINE

## 2024-05-08 ASSESSMENT — PAIN SCALES - GENERAL: PAINLEVEL: MODERATE PAIN (5)

## 2024-05-08 NOTE — PROGRESS NOTES
Preoperative Evaluation  [facility]  [address]  [facility] [address]  Phone: [phone]  Fax: [phone]  Primary Provider: Amber Grimes  Pre-op Performing Provider: YEMI HAWTHORNE  May 8, 2024       Lance is a 79 year old, presenting for the following:  Recheck Medication and Pre-Op Exam        5/8/2024    11:19 AM   Additional Questions   Roomed by am [facility]         5/8/2024   Forms   Any forms needing to be completed Yes     Surgical Information  Surgery/Procedure: left total knee arthroplasty  Surgery Location:   Surgeon: Dr Velasquez  Surgery Date: 5/20/24  Time of Surgery: 1 pm  Where patient plans to recover: At home with family  Fax number for surgical facility: [phone]    Assessment & Plan     The proposed surgical procedure is considered LOW risk.    Preop general physical exam  Patient has been therapeutically optimized for anticipated knee surgery    MED REC REQUIRED  Post Medication Reconciliation Status:      Implanted Device   - Type of device: Pain device [employer] Patient advised to bring device information on day of surgery.       - No identified additional risk factors other than previously addressed        Recommendation  APPROVAL GIVEN to proceed with proposed procedure, without further diagnostic evaluation.          Subjective       HPI related to upcoming procedure: Lance is an extremely nice patient who presents for preoperative clearance for a left knee total knee arthroplasty; he previously has had a right   total knee arthroplasty and is well versed and knowledgeable with regards to pre and postoperative care.  He is a type II diabetic he currently is on Rybelsus and glipizide with good glycemic control.  Medications morning of surgery were all reviewed with the patient is to take his blood pressure medication amlodipine and lisinopril he is to hold his aspirin 6 days before the surgery he may take his Pepcid the morning of surgery we did a  thorough medication review with the patient and he is quite knowledgeable will take one half of his Rybelsus dose at 7 mg depending on surgery timing.  He recently was hospitalized with a urticarial rash which required a comprehensive workup including MRCP ERCP various scans consultations but rash has finally cleared based on severe allergy to cephalexin.  Patient's other allergies are also noted on the chart.  Postoperative pain management will be by orthopedic surgery.  He is a very vigorous 79-year-old and has been therapeutically optimized once again for the anticipated knee surgery we wish him well        5/6/2024     9:10 PM   Preop Questions   1. Have you ever had a heart attack or stroke? No   2. Have you ever had surgery on your heart or blood vessels, such as a stent placement, a coronary artery bypass, or surgery on an artery in your head, neck, heart, or legs? YES -    3. Do you have chest pain with activity? No   4. Do you have a history of  heart failure? No   5. Do you currently have a cold, bronchitis or symptoms of other infection? No   6. Do you have a cough, shortness of breath, or wheezing? No   7. Do you or anyone in your family have previous history of blood clots? No   8. Do you or does anyone in your family have a serious bleeding problem such as prolonged bleeding following surgeries or cuts? No   9. Have you ever had problems with anemia or been told to take iron pills? YES -    10. Have you had any abnormal blood loss such as black, tarry or bloody stools? No   11. Have you ever had a blood transfusion? No   12. Are you willing to have a blood transfusion if it is medically needed before, during, or after your surgery? Yes   13. Have you or any of your relatives ever had problems with anesthesia? YES -    14. Do you have sleep apnea, excessive snoring or daytime drowsiness? No   15. Do you have any artifical heart valves or other implanted medical devices like a pacemaker, defibrillator,  or continuous glucose monitor? YES -    15a. What type of device do you have? Spinal Cord Stimulator   15b. Name of the clinic that manages your device:  [employer]   16. Do you have artificial joints? YES -    17. Are you allergic to latex? No       Health Care Directive  Patient has a Health Care Directive on file      Preoperative Review of    reviewed - no record of controlled substances prescribed.          Patient Active Problem List    Diagnosis Date Noted    Type 2 diabetes, HbA1c goal < 7% (H) 02/22/2013     Priority: High    Hypertension 07/06/2008     Priority: High    Transient cerebral ischemia 07/09/2004     Priority: High     Problem list name updated by automated process. Provider to review      Abdominal pain 04/30/2024     Priority: Medium    Hyperglycemia 04/26/2024     Priority: Medium    Elevated LFTs 04/26/2024     Priority: Medium    Elevated lipase 04/26/2024     Priority: Medium    Diffuse papular rash 04/26/2024     Priority: Medium    Enlarged prostate 04/16/2024     Priority: Medium    Coronary artery disease involving native coronary artery of native heart without angina pectoris 08/10/2023     Priority: Medium    Chronic systolic congestive heart failure (H) 08/08/2023     Priority: Medium    Osteomyelitis of ankle and foot (H) 08/07/2023     Priority: Medium    Cellulitis and abscess of foot excluding toe 07/25/2023     Priority: Medium    Diabetic ulcer of toe of right foot associated with diabetes mellitus due to underlying condition, with necrosis of bone (H) 07/25/2023     Priority: Medium    Slowing of urinary stream 02/01/2023     Priority: Medium    Nocturia 02/01/2023     Priority: Medium    Diabetic ulcer of toe of right foot associated with diabetes mellitus due to underlying condition, limited to breakdown of skin (H) 01/26/2023     Priority: Medium    Muscle pain 12/19/2022     Priority: Medium    Diabetic neuropathy associated with type 2 diabetes mellitus (H)  10/17/2022     Priority: Medium    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 10/14/2022     Priority: Medium    Cellulitis of right lower extremity 08/25/2022     Priority: Medium    H/O amputation of lesser toe, left (H24) 01/26/2022     Priority: Medium    Acute idiopathic gout of right hand 12/27/2021     Priority: Medium    Other constipation 12/09/2021     Priority: Medium    Acute renal insufficiency 12/03/2021     Priority: Medium    Acute right-sided thoracic back pain 12/03/2021     Priority: Medium    Ulcerated, foot, unspecified laterality, limited to breakdown of skin (H) 07/28/2021     Priority: Medium    Ulcerated, foot (H) 07/27/2020     Priority: Medium    Obesity (BMI 35.0-39.9) with comorbidity (H) 02/05/2020     Priority: Medium    Stage 3b chronic kidney disease (H) 02/05/2020     Priority: Medium    Shortness of breath 03/04/2019     Priority: Medium     Added automatically from request for surgery [id number]      Renal colic 03/04/2019     Priority: Medium    Dyspnea on exertion 02/22/2019     Priority: Medium     Added automatically from request for surgery [id number]      Chest pain, unspecified type 02/22/2019     Priority: Medium     Added automatically from request for surgery [id number]      Cardiomyopathy, unspecified type (H) 02/22/2019     Priority: Medium     Added automatically from request for surgery [id number]      Skin rash 12/08/2018     Priority: Medium    Total knee replacement status 12/07/2018     Priority: Medium    Amputated toe, left (H24) 11/08/2018     Priority: Medium    Cervicalgia 02/24/2017     Priority: Medium    Lower urinary tract symptoms 04/07/2016     Priority: Medium    Type II diabetes mellitus (H) 07/09/2013     Priority: Medium    Gouty arthropathy 07/09/2013     Priority: Medium    CARDIOVASCULAR SCREENING; LDL GOAL LESS THAN 100 10/31/2010     Priority: Medium    Hypertrophy of prostate with urinary obstruction 07/09/2004     Priority: Medium     Problem list name updated by  automated process. Provider to review      Ventral hernia 06/28/2004     Priority: Medium     Problem list name updated by automated process. Provider to review      Chronic low back pain 01/01/2000     Priority: Medium    Gout 07/20/2012     Priority: Low    Chronic rhinitis 07/09/2004     Priority: Low      Past Medical History:   Diagnosis Date    Anemia     Arthritis     osteoarthritis knees    BPH     CAD (coronary artery disease)     subtotal occlusion in the small distal LAD     Cardiomyopathy     Cerebral artery occlusion with cerebral infarction     TIA 1993, no residual    Cervicalgia     CHF (congestive heart failure) (H)     Chronic kidney disease     Chronic low back pain     Chronic rhinitis     Gouty arthropathy     Hyperlipidemia     Hypertension     Kidney stone     Nocturia     Obesity     PVD (peripheral vascular disease)     Sleep apnea     doesn't use cpap    TIA (transient ischaemic attack) 1993    Type 2 diabetes mellitus - 11/08/2018    Ureteral stone      Past Surgical History:   Procedure Laterality Date    AMPUTATE FOOT Right 08/07/2023    Procedure: AMPUTATION, right hallux;  Surgeon: Nakul Salazar DPM;  Location: [facility] Main OR    AMPUTATE TOE(S) Left 10/15/2018    Procedure: AMPUTATE TOE(S);  Left third toe amputation;  Surgeon: Ayaka Azevedo DPM, Podiatry/Foot and Ankle Surgery;  Location:  OR    ARTHROPLASTY KNEE Right 12/07/2018    Procedure: Right total knee arthroplasty;  Surgeon: Issa Cunha MD;  Location:  OR    COLONOSCOPY  03/09/2013    Procedure: COLONOSCOPY;  COLONOSCOPY;  Surgeon: Chau Hogan MD;  Location:  GI    CV HEART CATHETERIZATION WITH POSSIBLE INTERVENTION Left 03/05/2019    Procedure: Coronary Angiogram;  Surgeon: Nas Linda MD;  Location:  HEART CARDIAC CATH LAB    Diastasis recti repair  1985    ENDOSCOPIC RETROGRADE CHOLANGIOPANCREATOGRAM N/A 4/30/2024    Procedure: ENDOSCOPIC RETROGRADE CHOLANGIOPANCREATOGRAPHY, BILIARY  SPHINCTEROTOMY, DILATION, BRUSHING, BIOPSIES with DISTAL EXTRA HEPATIC BILE DUCT STRICTURE;  Surgeon: Aldo Gongora MD;  Location: [facility] OR    ESOPHAGOSCOPY, GASTROSCOPY, DUODENOSCOPY (EGD), COMBINED N/A 4/30/2024    Procedure: ESOPHAGOGASTRODUODENOSCOPY, WITH ENDOSCOPIC ULTRASOUND GUIDANCE;  Surgeon: Aldo Gongora MD;  Location: [facility] OR    EXTRACAPSULAR CATARACT EXTRATION WITH INTRAOCULAR LENS IMPLANT Left 03/13/2017    EXTRACAPSULAR CATARACT EXTRATION WITH INTRAOCULAR LENS IMPLANT Right     FOOT SURGERY  04/2013    cyst removal     HERNIA REPAIR  07/13/2004    ventral     ORIF Shoulder Left     urolift  01/15/2024    Ventral hernia repair NOS  1987     Current Outpatient Medications   Medication Sig Dispense Refill    amLODIPine (NORVASC) 5 MG tablet TAKE 1 TABLET DAILY 90 tablet 3    aspirin 81 MG EC tablet Take 1 tablet (81 mg) by mouth daily      blood glucose monitoring (NO BRAND SPECIFIED) meter device kit Use to test blood sugar 2 times daily or as directed. 1 kit 0    calcium carbonate (TUMS) 500 MG chewable tablet Take 1,000 mg by mouth 3 times daily as needed for heartburn      colchicine (COLCYRS) 0.6 MG tablet TAKE 1 TABLET DAILY 90 tablet 3    CONTOUR NEXT TEST test strip USE TO TEST BLOOD SUGAR 2 TIMES DAILY OR AS DIRECTED. 100 strip 0    diclofenac (VOLTAREN) 1 % topical gel Apply 4 g topically 4 times daily as needed for moderate pain      emollient (VANICREAM) external cream Apply topically 3 times daily 113 g 3    famotidine (PEPCID) 20 MG tablet Take 1 tablet (20 mg) by mouth 2 times daily as needed (itching) 30 tablet 0    Ferrous Gluconate 240 (27 Fe) MG TABS Take 1 tablet by mouth daily       fluticasone (FLONASE) 50 MCG/ACT nasal spray Spray 1-2 sprays in nostril daily as needed      gabapentin (NEURONTIN) 300 MG capsule Take 1 capsule (300 mg) by mouth 3 times daily as needed      glipiZIDE (GLUCOTROL XL) 2.5 MG 24 hr tablet Take 1 tablet (2.5 mg) by mouth daily 30  "tablet 0    HEMP OIL OR EXTRACT OR OTHER CBD CANNABINOID, NOT MEDICAL CANNABIS, Take 1 chew tab by mouth at bedtime CBD gummy      hydrOXYzine HCl (ATARAX) 25 MG tablet Take 1 tablet (25 mg) by mouth 3 times daily as needed for itching 20 tablet 0    isosorbide mononitrate (IMDUR) 30 MG 24 hr tablet TAKE 1 TABLET DAILY 90 tablet 0    Lidocaine (LIDOCARE) 4 % Patch Place 1 patch onto the skin daily as needed To prevent lidocaine toxicity, patient should be patch free for 12 hrs daily.      lisinopril (ZESTRIL) 10 MG tablet Take 1 tablet (10 mg) by mouth every 24 hours 90 tablet 3    loratadine (CLARITIN) 10 MG tablet Take 10 mg by mouth daily as needed for allergies      melatonin 5 MG CAPS Take 5 mg by mouth at bedtime      Microlet Lancets MISC USE TO TEST BLOOD SUGAR TWICE A DAY OR AS DIRECTED 100 each 1    multivitamin w/minerals (THERA-VIT-M) tablet Take 1 tablet by mouth daily      nortriptyline (PAMELOR) 10 MG capsule Take 20 mg by mouth nightly as needed      Probiotic Product (FORTIFY 30 BILLION PROBIOT 50+) CPDR Take 1 5 Pack by mouth daily 90 capsule 1    Semaglutide (RYBELSUS) 14 MG tablet Take 14 mg by mouth daily 30 tablet 3    sodium bicarbonate 650 MG tablet Take 3 tablets (1,950 mg) by mouth 3 times daily 180 tablet 1    torsemide (DEMADEX) 20 MG tablet TAKE 1 TABLET DAILY 90 tablet 3    ursodiol (ACTIGALL) 300 MG capsule Take 1 capsule (300 mg) by mouth 2 times daily 60 capsule 0       Allergies   Allergen Reactions    Penicillins Anaphylaxis     22 - tolerated cefepime     Sulfa Antibiotics      \"itchy rash, swelling of face and hands\"    Ancef [Cefazolin] Rash    Cephalexin Itching and Rash        Social History     Tobacco Use    Smoking status: Former     Current packs/day: 0.00     Types: Cigarettes     Quit date: 3/17/1993     Years since quittin.1    Smokeless tobacco: Never   Substance Use Topics    Alcohol use: Not Currently       History   Drug Use No         Review of " Systems    Review of Systems  CONSTITUTIONAL: NEGATIVE for fever, chills, change in weight  INTEGUMENTARY/SKIN: NEGATIVE for worrisome rashes, moles or lesions  EYES: NEGATIVE for vision changes or irritation  ENT/MOUTH: NEGATIVE for ear, mouth and throat problems  RESP: NEGATIVE for significant cough or SOB  BREAST: NEGATIVE for masses, tenderness or discharge  CV: NEGATIVE for chest pain, palpitations or peripheral edema  GI: NEGATIVE for nausea, abdominal pain, heartburn, or change in bowel habits  : NEGATIVE for frequency, dysuria, or hematuria  MUSCULOSKELETAL: NEGATIVE for significant arthralgias or myalgia  NEURO: NEGATIVE for weakness, dizziness or paresthesias  ENDOCRINE: NEGATIVE for temperature intolerance, skin/hair changes  HEME: NEGATIVE for bleeding problems  PSYCHIATRIC: NEGATIVE for changes in mood or affect    Objective    /78   Pulse 99   Temp 98  F (36.7  C)   Resp 18   Ht 1.829 m (6')   Wt 99.3 kg (219 lb)   SpO2 98%   BMI 29.70 kg/m     Estimated body mass index is 29.7 kg/m  as calculated from the following:    Height as of this encounter: 1.829 m (6').    Weight as of this encounter: 99.3 kg (219 lb).  Physical Exam  GENERAL: alert and no distress  EYES: Eyes grossly normal to inspection, PERRL and conjunctivae and sclerae normal  HENT: ear canals and TM's normal, nose and mouth without ulcers or lesions  NECK: no adenopathy, no asymmetry, masses, or scars  RESP: lungs clear to auscultation - no rales, rhonchi or wheezes  CV: regular rate and rhythm, normal S1 S2, no S3 or S4, no murmur, click or rub, no peripheral edema  ABDOMEN: soft, nontender, no hepatosplenomegaly, no masses and bowel sounds normal  MS: no gross musculoskeletal defects noted, no edema  SKIN: no suspicious lesions or rashes  NEURO: Normal strength and tone, mentation intact and speech normal  PSYCH: mentation appears normal, affect normal/bright    Recent Labs   Lab Test 05/02/24  0623 05/01/24  0703  04/26/24  0852 04/26/24  0430 03/27/24  1338 02/27/24  1113 08/08/23  0554 07/19/23  0904   HGB 10.1* 10.5*   < > 11.9*   < > 12.3*   < > 10.2*    215   < > 253   < > 234   < >  --    INR  --   --   --  1.11  --   --   --   --     138   < > 138   < >  --    < >  --    POTASSIUM 3.6 3.9   < > 3.8   < > 4.7   < >  --    CR 1.53* 1.69*   < > 2.05*   < >  --    < >  --    A1C  --   --   --   --   --  8.0*  --  7.3*    < > = values in this interval not displayed.        Diagnostics  No labs were ordered during this visit.   EKG: appears normal, NSR, normal axis, normal intervals, no acute ST/T changes c/w ischemia, no LVH by voltage criteria, unchanged from previous tracings    Revised Cardiac Risk Index (RCRI)  The patient has the following serious cardiovascular risks for perioperative complications:   - No serious cardiac risks = 0 points     RCRI Interpretation: 1 point: Class II (low risk - 0.9% complication rate)         Signed Electronically by: Rickey Adamson MD  Copy of this evaluation report is provided to requesting physician.

## 2024-05-09 NOTE — PROGRESS NOTES
Planning to discharge home on POD 1 in the morning.       05/09/24 3846   Discharge Planning   Patient/Family Anticipates Transition to home;home with help/services  (Pt. has outpatient PT scheduled to start 1 week after surgery, but will call to arrange a home care PCA to stay with him the first night after surgery.)   Concerns to be Addressed discharge planning   Living Arrangements   People in Home alone   Type of Residence Private Residence   Is your private residence a single family home or apartment? Apartment   Number of Stairs, Within Home, Primary none   Once home, are you able to live on one level? Yes   Which level? Main Level   Bathroom Shower/Tub Walk-in shower   Equipment Currently Used at Home walker, rolling;grab bar, tub/shower;grab bar, toilet;shower chair   Support System   Support Systems Home Care Staff  (Pt. has a step son and in-laws that live near by, but they are not willing to stay with him after surgery. A list of home care agencies that can provide PCA care for him after surgery was given to the Pt. He is willing to private pay for a homecare PCA.)   Do you have someone available to stay with you one or two nights once you are home? No   Education   Patient attended total joint pre-op class/received pre-op teaching  email/phone call

## 2024-05-09 NOTE — PROVIDER NOTIFICATION
Scheduled for left total knee arthroplasty 5/20/24 with Dr. Velasquez at [facility].    Dr. Velasquez's team informed of Hgb 10.1, elevated LFTs, and recent hospitalization for allergic reaction. Dr. Velasquez is OK with abnormal labs, but the skin rash from the allergic reaction needs to be completely resolved before surgery.    Tsering Danielson RN

## 2024-05-16 ENCOUNTER — APPOINTMENT (OUTPATIENT)
Dept: CT IMAGING | Facility: CLINIC | Age: 79
DRG: 871 | End: 2024-05-16
Attending: EMERGENCY MEDICINE
Payer: COMMERCIAL

## 2024-05-16 ENCOUNTER — APPOINTMENT (OUTPATIENT)
Dept: RADIOLOGY | Facility: CLINIC | Age: 79
DRG: 871 | End: 2024-05-16
Attending: EMERGENCY MEDICINE
Payer: COMMERCIAL

## 2024-05-16 ENCOUNTER — HOSPITAL ENCOUNTER (INPATIENT)
Facility: CLINIC | Age: 79
LOS: 6 days | Discharge: NURSING, REHAB OR LONG-TERM CARE | DRG: 871 | End: 2024-05-22
Attending: EMERGENCY MEDICINE | Admitting: STUDENT IN AN ORGANIZED HEALTH CARE EDUCATION/TRAINING PROGRAM
Payer: COMMERCIAL

## 2024-05-16 DIAGNOSIS — R78.81 BACTEREMIA: ICD-10-CM
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
## 2024-05-16 DIAGNOSIS — N30.00 ACUTE CYSTITIS WITHOUT HEMATURIA: ICD-10-CM

## 2024-05-16 DIAGNOSIS — E11.9 TYPE 2 DIABETES, HBA1C GOAL < 7% (H): ICD-10-CM

## 2024-05-16 DIAGNOSIS — M54.50 CHRONIC LOW BACK PAIN WITHOUT SCIATICA, UNSPECIFIED BACK PAIN LATERALITY: Primary | ICD-10-CM

## 2024-05-16 DIAGNOSIS — G89.29 CHRONIC LOW BACK PAIN, UNSPECIFIED BACK PAIN LATERALITY, UNSPECIFIED WHETHER SCIATICA PRESENT: ICD-10-CM

## 2024-05-16 DIAGNOSIS — G89.29 CHRONIC LOW BACK PAIN WITHOUT SCIATICA, UNSPECIFIED BACK PAIN LATERALITY: Primary | ICD-10-CM

## 2024-05-16 DIAGNOSIS — R09.02 HYPOXIA: ICD-10-CM

## 2024-05-16 DIAGNOSIS — M54.50 CHRONIC LOW BACK PAIN, UNSPECIFIED BACK PAIN LATERALITY, UNSPECIFIED WHETHER SCIATICA PRESENT: ICD-10-CM

## 2024-05-16 LAB
ALBUMIN SERPL BCG-MCNC: 2.9 G/DL (ref 3.5–5.2)
ALBUMIN UR-MCNC: 100 MG/DL
ALP SERPL-CCNC: 227 U/L (ref 40–150)
ALT SERPL W P-5'-P-CCNC: 74 U/L (ref 0–70)
ANION GAP SERPL CALCULATED.3IONS-SCNC: 17 MMOL/L (ref 7–15)
APPEARANCE UR: ABNORMAL
AST SERPL W P-5'-P-CCNC: 60 U/L (ref 0–45)
ATRIAL RATE - MUSE: 101 BPM
BACTERIA #/AREA URNS HPF: ABNORMAL /HPF
BASOPHILS # BLD AUTO: 0.1 10E3/UL (ref 0–0.2)
BASOPHILS NFR BLD AUTO: 1 %
BILIRUB SERPL-MCNC: 1.7 MG/DL
BILIRUB UR QL STRIP: NEGATIVE
BUN SERPL-MCNC: 55.8 MG/DL (ref 8–23)
CALCIUM SERPL-MCNC: 8.9 MG/DL (ref 8.8–10.2)
CHLORIDE SERPL-SCNC: 104 MMOL/L (ref 98–107)
CK SERPL-CCNC: 427 U/L (ref 39–308)
COLOR UR AUTO: YELLOW
CREAT SERPL-MCNC: 1.91 MG/DL (ref 0.67–1.17)
CREAT SERPL-MCNC: 1.98 MG/DL (ref 0.67–1.17)
DEPRECATED HCO3 PLAS-SCNC: 21 MMOL/L (ref 22–29)
DIASTOLIC BLOOD PRESSURE - MUSE: 67 MMHG
EGFRCR SERPLBLD CKD-EPI 2021: 34 ML/MIN/1.73M2
EGFRCR SERPLBLD CKD-EPI 2021: 35 ML/MIN/1.73M2
EOSINOPHIL # BLD AUTO: 0 10E3/UL (ref 0–0.7)
EOSINOPHIL NFR BLD AUTO: 0 %
ERYTHROCYTE [DISTWIDTH] IN BLOOD BY AUTOMATED COUNT: 17.2 % (ref 10–15)
FLUAV RNA SPEC QL NAA+PROBE: NEGATIVE
FLUBV RNA RESP QL NAA+PROBE: NEGATIVE
GLUCOSE BLDC GLUCOMTR-MCNC: 218 MG/DL (ref 70–99)
GLUCOSE SERPL-MCNC: 270 MG/DL (ref 70–99)
GLUCOSE UR STRIP-MCNC: NEGATIVE MG/DL
HCT VFR BLD AUTO: 33.6 % (ref 40–53)
HGB BLD-MCNC: 10.7 G/DL (ref 13.3–17.7)
HGB UR QL STRIP: ABNORMAL
IMM GRANULOCYTES # BLD: 0.4 10E3/UL
IMM GRANULOCYTES NFR BLD: 5 %
INTERPRETATION ECG - MUSE: NORMAL
KETONES UR STRIP-MCNC: ABNORMAL MG/DL
LEUKOCYTE ESTERASE UR QL STRIP: ABNORMAL
LYMPHOCYTES # BLD AUTO: 1.1 10E3/UL (ref 0.8–5.3)
LYMPHOCYTES NFR BLD AUTO: 13 %
MAGNESIUM SERPL-MCNC: 2.2 MG/DL (ref 1.7–2.3)
MCH RBC QN AUTO: 28.7 PG (ref 26.5–33)
MCHC RBC AUTO-ENTMCNC: 31.8 G/DL (ref 31.5–36.5)
MCV RBC AUTO: 90 FL (ref 78–100)
MONOCYTES # BLD AUTO: 0.5 10E3/UL (ref 0–1.3)
MONOCYTES NFR BLD AUTO: 6 %
MUCOUS THREADS #/AREA URNS LPF: PRESENT /LPF
NEUTROPHILS # BLD AUTO: 6.5 10E3/UL (ref 1.6–8.3)
NEUTROPHILS NFR BLD AUTO: 76 %
NITRATE UR QL: NEGATIVE
NRBC # BLD AUTO: 0 10E3/UL
NRBC BLD AUTO-RTO: 0 /100
NT-PROBNP SERPL-MCNC: 3157 PG/ML (ref 0–1800)
P AXIS - MUSE: 67 DEGREES
PH UR STRIP: 5.5 [PH] (ref 5–7)
PHOSPHATE SERPL-MCNC: 4 MG/DL (ref 2.5–4.5)
PLATELET # BLD AUTO: 185 10E3/UL (ref 150–450)
POTASSIUM SERPL-SCNC: 3.4 MMOL/L (ref 3.4–5.3)
PR INTERVAL - MUSE: 182 MS
PROT SERPL-MCNC: 6.7 G/DL (ref 6.4–8.3)
QRS DURATION - MUSE: 120 MS
QT - MUSE: 372 MS
QTC - MUSE: 482 MS
R AXIS - MUSE: -28 DEGREES
RBC # BLD AUTO: 3.73 10E6/UL (ref 4.4–5.9)
RBC URINE: 1 /HPF
RSV RNA SPEC NAA+PROBE: NEGATIVE
SARS-COV-2 RNA RESP QL NAA+PROBE: NEGATIVE
SODIUM SERPL-SCNC: 142 MMOL/L (ref 135–145)
SP GR UR STRIP: 1.02 (ref 1–1.03)
SQUAMOUS EPITHELIAL: 2 /HPF
SYSTOLIC BLOOD PRESSURE - MUSE: 138 MMHG
T AXIS - MUSE: 110 DEGREES
TROPONIN T SERPL HS-MCNC: 32 NG/L
TROPONIN T SERPL HS-MCNC: 34 NG/L
UROBILINOGEN UR STRIP-MCNC: <2 MG/DL
VENTRICULAR RATE- MUSE: 101 BPM
WBC # BLD AUTO: 8.6 10E3/UL (ref 4–11)
WBC URINE: 31 /HPF

## 2024-05-16 PROCEDURE — 87086 URINE CULTURE/COLONY COUNT: CPT | Performed by: EMERGENCY MEDICINE

## 2024-05-16 PROCEDURE — 96360 HYDRATION IV INFUSION INIT: CPT | Mod: 59

## 2024-05-16 PROCEDURE — 84484 ASSAY OF TROPONIN QUANT: CPT | Performed by: EMERGENCY MEDICINE

## 2024-05-16 PROCEDURE — 258N000003 HC RX IP 258 OP 636

## 2024-05-16 PROCEDURE — 87186 SC STD MICRODIL/AGAR DIL: CPT | Performed by: EMERGENCY MEDICINE

## 2024-05-16 PROCEDURE — 200N000001 HC R&B ICU

## 2024-05-16 PROCEDURE — 85025 COMPLETE CBC W/AUTO DIFF WBC: CPT | Performed by: EMERGENCY MEDICINE

## 2024-05-16 PROCEDURE — 84100 ASSAY OF PHOSPHORUS: CPT

## 2024-05-16 PROCEDURE — 80053 COMPREHEN METABOLIC PANEL: CPT | Performed by: EMERGENCY MEDICINE

## 2024-05-16 PROCEDURE — 83735 ASSAY OF MAGNESIUM: CPT | Performed by: EMERGENCY MEDICINE

## 2024-05-16 PROCEDURE — 250N000011 HC RX IP 250 OP 636: Performed by: EMERGENCY MEDICINE

## 2024-05-16 PROCEDURE — 81001 URINALYSIS AUTO W/SCOPE: CPT | Performed by: EMERGENCY MEDICINE

## 2024-05-16 PROCEDURE — 93005 ELECTROCARDIOGRAM TRACING: CPT | Performed by: EMERGENCY MEDICINE

## 2024-05-16 PROCEDURE — 72128 CT CHEST SPINE W/O DYE: CPT

## 2024-05-16 PROCEDURE — 36415 COLL VENOUS BLD VENIPUNCTURE: CPT | Performed by: EMERGENCY MEDICINE

## 2024-05-16 PROCEDURE — 99285 EMERGENCY DEPT VISIT HI MDM: CPT | Mod: 25

## 2024-05-16 PROCEDURE — 83880 ASSAY OF NATRIURETIC PEPTIDE: CPT

## 2024-05-16 PROCEDURE — 82962 GLUCOSE BLOOD TEST: CPT

## 2024-05-16 PROCEDURE — 87637 SARSCOV2&INF A&B&RSV AMP PRB: CPT | Performed by: EMERGENCY MEDICINE

## 2024-05-16 PROCEDURE — 258N000003 HC RX IP 258 OP 636: Performed by: EMERGENCY MEDICINE

## 2024-05-16 PROCEDURE — 250N000011 HC RX IP 250 OP 636

## 2024-05-16 PROCEDURE — 70450 CT HEAD/BRAIN W/O DYE: CPT

## 2024-05-16 PROCEDURE — 71275 CT ANGIOGRAPHY CHEST: CPT

## 2024-05-16 PROCEDURE — 73030 X-RAY EXAM OF SHOULDER: CPT | Mod: 50

## 2024-05-16 PROCEDURE — 82550 ASSAY OF CK (CPK): CPT | Performed by: EMERGENCY MEDICINE

## 2024-05-16 PROCEDURE — 73522 X-RAY EXAM HIPS BI 3-4 VIEWS: CPT

## 2024-05-16 PROCEDURE — 72131 CT LUMBAR SPINE W/O DYE: CPT

## 2024-05-16 PROCEDURE — 72125 CT NECK SPINE W/O DYE: CPT

## 2024-05-16 PROCEDURE — 82565 ASSAY OF CREATININE: CPT

## 2024-05-16 RX ORDER — FERROUS GLUCONATE 324(38)MG
324 TABLET ORAL DAILY
Status: DISCONTINUED | OUTPATIENT
Start: 2024-05-17 | End: 2024-05-17

## 2024-05-16 RX ORDER — LISINOPRIL 10 MG/1
10 TABLET ORAL EVERY 24 HOURS
Status: DISCONTINUED | OUTPATIENT
Start: 2024-05-16 | End: 2024-05-17

## 2024-05-16 RX ORDER — PROCHLORPERAZINE MALEATE 5 MG
5 TABLET ORAL EVERY 6 HOURS PRN
Status: DISCONTINUED | OUTPATIENT
Start: 2024-05-16 | End: 2024-05-22 | Stop reason: HOSPADM

## 2024-05-16 RX ORDER — BISACODYL 10 MG
10 SUPPOSITORY, RECTAL RECTAL DAILY PRN
Status: DISCONTINUED | OUTPATIENT
Start: 2024-05-16 | End: 2024-05-22 | Stop reason: HOSPADM

## 2024-05-16 RX ORDER — SODIUM CHLORIDE 9 MG/ML
INJECTION, SOLUTION INTRAVENOUS CONTINUOUS
Status: DISCONTINUED | OUTPATIENT
Start: 2024-05-16 | End: 2024-05-17

## 2024-05-16 RX ORDER — NICOTINE POLACRILEX 4 MG
15-30 LOZENGE BUCCAL
Status: DISCONTINUED | OUTPATIENT
Start: 2024-05-16 | End: 2024-05-22 | Stop reason: HOSPADM

## 2024-05-16 RX ORDER — AMOXICILLIN 250 MG
2 CAPSULE ORAL 2 TIMES DAILY PRN
Status: DISCONTINUED | OUTPATIENT
Start: 2024-05-16 | End: 2024-05-22 | Stop reason: HOSPADM

## 2024-05-16 RX ORDER — SODIUM BICARBONATE 650 MG/1
1950 TABLET ORAL 3 TIMES DAILY
Status: DISCONTINUED | OUTPATIENT
Start: 2024-05-17 | End: 2024-05-22 | Stop reason: HOSPADM

## 2024-05-16 RX ORDER — ENOXAPARIN SODIUM 100 MG/ML
40 INJECTION SUBCUTANEOUS EVERY 24 HOURS
Status: DISCONTINUED | OUTPATIENT
Start: 2024-05-16 | End: 2024-05-17

## 2024-05-16 RX ORDER — AMOXICILLIN 250 MG
1 CAPSULE ORAL 2 TIMES DAILY PRN
Status: DISCONTINUED | OUTPATIENT
Start: 2024-05-16 | End: 2024-05-22 | Stop reason: HOSPADM

## 2024-05-16 RX ORDER — MORPHINE SULFATE 4 MG/ML
4 INJECTION, SOLUTION INTRAMUSCULAR; INTRAVENOUS
Status: COMPLETED | OUTPATIENT
Start: 2024-05-16 | End: 2024-05-16

## 2024-05-16 RX ORDER — ISOSORBIDE MONONITRATE 30 MG/1
30 TABLET, EXTENDED RELEASE ORAL DAILY
Status: DISCONTINUED | OUTPATIENT
Start: 2024-05-17 | End: 2024-05-22 | Stop reason: HOSPADM

## 2024-05-16 RX ORDER — LIDOCAINE 40 MG/G
CREAM TOPICAL
Status: DISCONTINUED | OUTPATIENT
Start: 2024-05-16 | End: 2024-05-16

## 2024-05-16 RX ORDER — AMLODIPINE BESYLATE 5 MG/1
5 TABLET ORAL DAILY
Status: DISCONTINUED | OUTPATIENT
Start: 2024-05-17 | End: 2024-05-17

## 2024-05-16 RX ORDER — ACETAMINOPHEN 325 MG/1
975 TABLET ORAL 3 TIMES DAILY
Status: DISCONTINUED | OUTPATIENT
Start: 2024-05-17 | End: 2024-05-22 | Stop reason: HOSPADM

## 2024-05-16 RX ORDER — CALCIUM CARBONATE 500 MG/1
1000 TABLET, CHEWABLE ORAL 4 TIMES DAILY PRN
Status: DISCONTINUED | OUTPATIENT
Start: 2024-05-16 | End: 2024-05-22 | Stop reason: HOSPADM

## 2024-05-16 RX ORDER — DEXTROSE MONOHYDRATE 25 G/50ML
25-50 INJECTION, SOLUTION INTRAVENOUS
Status: DISCONTINUED | OUTPATIENT
Start: 2024-05-16 | End: 2024-05-22 | Stop reason: HOSPADM

## 2024-05-16 RX ORDER — OXYCODONE HYDROCHLORIDE 5 MG/1
5 TABLET ORAL EVERY 4 HOURS PRN
Status: DISCONTINUED | OUTPATIENT
Start: 2024-05-16 | End: 2024-05-17

## 2024-05-16 RX ORDER — FLUTICASONE PROPIONATE 50 MCG
1-2 SPRAY, SUSPENSION (ML) NASAL DAILY PRN
Status: DISCONTINUED | OUTPATIENT
Start: 2024-05-16 | End: 2024-05-22 | Stop reason: HOSPADM

## 2024-05-16 RX ORDER — LORATADINE 10 MG/1
10 TABLET ORAL DAILY PRN
Status: DISCONTINUED | OUTPATIENT
Start: 2024-05-16 | End: 2024-05-22 | Stop reason: HOSPADM

## 2024-05-16 RX ORDER — POLYETHYLENE GLYCOL 3350 17 G/17G
17 POWDER, FOR SOLUTION ORAL 2 TIMES DAILY PRN
Status: DISCONTINUED | OUTPATIENT
Start: 2024-05-16 | End: 2024-05-22 | Stop reason: HOSPADM

## 2024-05-16 RX ORDER — MULTIPLE VITAMINS W/ MINERALS TAB 9MG-400MCG
1 TAB ORAL DAILY
Status: DISCONTINUED | OUTPATIENT
Start: 2024-05-17 | End: 2024-05-22 | Stop reason: HOSPADM

## 2024-05-16 RX ORDER — FAMOTIDINE 20 MG/1
20 TABLET, FILM COATED ORAL DAILY PRN
Status: DISCONTINUED | OUTPATIENT
Start: 2024-05-16 | End: 2024-05-22 | Stop reason: HOSPADM

## 2024-05-16 RX ORDER — GABAPENTIN 300 MG/1
300 CAPSULE ORAL 3 TIMES DAILY PRN
Status: DISCONTINUED | OUTPATIENT
Start: 2024-05-16 | End: 2024-05-22 | Stop reason: HOSPADM

## 2024-05-16 RX ORDER — TORSEMIDE 20 MG/1
20 TABLET ORAL DAILY
Status: DISCONTINUED | OUTPATIENT
Start: 2024-05-17 | End: 2024-05-17

## 2024-05-16 RX ORDER — LIDOCAINE 4 G/G
1 PATCH TOPICAL DAILY PRN
Status: DISCONTINUED | OUTPATIENT
Start: 2024-05-16 | End: 2024-05-22 | Stop reason: HOSPADM

## 2024-05-16 RX ORDER — URSODIOL 300 MG/1
300 CAPSULE ORAL 2 TIMES DAILY
Status: DISCONTINUED | OUTPATIENT
Start: 2024-05-16 | End: 2024-05-22 | Stop reason: HOSPADM

## 2024-05-16 RX ORDER — IOPAMIDOL 755 MG/ML
75 INJECTION, SOLUTION INTRAVASCULAR ONCE
Status: COMPLETED | OUTPATIENT
Start: 2024-05-16 | End: 2024-05-16

## 2024-05-16 RX ORDER — HYDROXYZINE HYDROCHLORIDE 25 MG/1
25 TABLET, FILM COATED ORAL 3 TIMES DAILY PRN
Status: DISCONTINUED | OUTPATIENT
Start: 2024-05-16 | End: 2024-05-21

## 2024-05-16 RX ORDER — PROCHLORPERAZINE 25 MG
12.5 SUPPOSITORY, RECTAL RECTAL EVERY 12 HOURS PRN
Status: DISCONTINUED | OUTPATIENT
Start: 2024-05-16 | End: 2024-05-22 | Stop reason: HOSPADM

## 2024-05-16 RX ORDER — ASPIRIN 81 MG/1
81 TABLET ORAL DAILY
Status: DISCONTINUED | OUTPATIENT
Start: 2024-05-17 | End: 2024-05-22 | Stop reason: HOSPADM

## 2024-05-16 RX ADMIN — IOPAMIDOL 75 ML: 755 INJECTION, SOLUTION INTRAVENOUS at 22:15

## 2024-05-16 RX ADMIN — MORPHINE SULFATE 4 MG: 4 INJECTION, SOLUTION INTRAMUSCULAR; INTRAVENOUS at 18:15

## 2024-05-16 RX ADMIN — ENOXAPARIN SODIUM 40 MG: 100 INJECTION SUBCUTANEOUS at 23:32

## 2024-05-16 RX ADMIN — SODIUM CHLORIDE, PRESERVATIVE FREE: 5 INJECTION INTRAVENOUS at 23:22

## 2024-05-16 RX ADMIN — SODIUM CHLORIDE 1000 ML: 9 INJECTION, SOLUTION INTRAVENOUS at 16:26

## 2024-05-16 ASSESSMENT — COLUMBIA-SUICIDE SEVERITY RATING SCALE - C-SSRS
2. HAVE YOU ACTUALLY HAD ANY THOUGHTS OF KILLING YOURSELF IN THE PAST MONTH?: NO
6. HAVE YOU EVER DONE ANYTHING, STARTED TO DO ANYTHING, OR PREPARED TO DO ANYTHING TO END YOUR LIFE?: NO
1. IN THE PAST MONTH, HAVE YOU WISHED YOU WERE DEAD OR WISHED YOU COULD GO TO SLEEP AND NOT WAKE UP?: NO

## 2024-05-16 ASSESSMENT — ACTIVITIES OF DAILY LIVING (ADL)
ADLS_ACUITY_SCORE: 37
ADLS_ACUITY_SCORE: 37
ADLS_ACUITY_SCORE: 39
ADLS_ACUITY_SCORE: 37
ADLS_ACUITY_SCORE: 39
ADLS_ACUITY_SCORE: 39
ADLS_ACUITY_SCORE: 37
ADLS_ACUITY_SCORE: 39

## 2024-05-16 NOTE — ED TRIAGE NOTES
Patient arrived to ED via EMS from home with complains of fall. Pt reports that on Monday night pt was changing his bed sheets when he lost his balance and landed on the floor. Pt didn't hit his head or loose LOC. Since Monday night pt has been on the floor until today. Son called EMS. Up arrival pt incontinent bowel and bladder. Alert and oriented x4         Triage Assessment (Adult)       Row Name 05/16/24 1525          Triage Assessment    Airway WDL WDL        Respiratory WDL    Respiratory WDL WDL

## 2024-05-16 NOTE — ED NOTES
Bed: [facility]07  Expected date: 5/16/24  Expected time:   Means of arrival: Ambulance  Comments:  [place] 78 yo male hip pain

## 2024-05-16 NOTE — ED PROVIDER NOTES
EMERGENCY DEPARTMENT ENCOUNTER      NAME: Lance Ibrahim  AGE: 79 year old male  YOB: 1945  MRN: [id number]  EVALUATION DATE & TIME: 5/16/2024  3:19 PM    PCP: Amber Grimes    ED PROVIDER: Alexx Adair M.D.      Chief Complaint   Patient presents with    Fall         FINAL IMPRESSION:  1. Hypoxia    2. Fall, initial encounter          ED COURSE & MEDICAL DECISION MAKING:    3:27 PM performed my initial evaluation of the patient  7:30 PM repeat exam significant for continued hypoxia and requiring supplemental oxygen, discussed findings, patient requesting trial of ambulation  8:16 PM Spoke with the accepting service.  Discussed findings with patient and need for admission.      Pertinent Labs & Imaging studies reviewed. (See chart for details)  79 year old male presents to the Emergency Department for evaluation of fall. Patient appears non toxic with stable vitals signs, patient is tachycardic but afebrile with no hypoxia.  No gross deformities and good range of motion bilateral hip and shoulders though guarded secondary to pain.  Per review of the medical record, did review discharge summary through [facility] on 5/2/2024 patient was admitted for rash and jaundice concerning for biliary obstruction with strictures status post ERCP with sphincterotomy and stent placement.  Patient denies any head or neck pain, and exam not suggestive of depressed call fracture, intracranial bleed, nothing just cervical fracture dislocation though unclear with his fall.  Considered hip and or shoulder fracture dislocation but does have good range of motion and no gross deformities.  No chest or abdominal pain, nothing to suggest ACS, PE, dissection, intrathoracic or intra-abdominal trauma.  More so concern for prolonged downtime, acute kidney injury, rhabdomyolysis, electrode abnormalities.  We will obtain screening labs, urine studies, CT imaging of the head, cervical, thoracic and  lumbar spine and plain films of the shoulders and hips.  Patient had received pain meds prior to arrival.    Reassessment: Labs by my independent interpretation concerning for elevated LFTs, alk phos and bilirubin though this appears improved from prior, again was recently admitted for biliary issues.  No signs of anemia with a hemoglobin of 10.7 both initial and repeat troponin not markedly elevated with nothing to suggest acute cardiac ischemia.  Imaging studies significant for arthritic changes to the hip and shoulders but otherwise imaging studies reported no new acute concerning findings.  Urine showed blood and leukocytes but no nitrates, we will send for culture.  Repeat exam patient appears somewhat more comfortable however unable to ambulate here well, also patient had drifting down oxygenation saturations, responded well to minimal supplemental oxygen, patient states he is not on oxygen at home.  Again he denies any chest pain, pleurisy, no shortness of breath.  Feel he would benefit from admission for continued workup and will likely need PT OT and consideration for placement issues.  Will obtain CT imaging of the chest given his recent admission and procedures, considered but overall low suspicion for PE, consider possible pneumonia or pleural effusions.  At time of this dictation CT images pending.  Discussed these findings and need for admission with the patient.  Discussed care plan to admitting service.    Medical Decision Making  Obtained supplemental history:Supplemental history obtained?: Documented in chart and Family Member/Significant Other  Reviewed external records: External records reviewed?: Documented in chart  Care impacted by chronic illness:Diabetes  Care significantly affected by social determinants of health:N/A  Did you consider but not order tests?: Work up considered but not performed and documented in chart, if applicable  Did you interpret images independently?: Independent  interpretation of ECG and images noted in documentation, when applicable.  Consultation discussion with other provider:Did you involve another provider (consultant, , pharmacy, etc.)?: I discussed the care with another health care provider, see documentation for details.  Admit.      At the conclusion of the encounter I discussed the results of all of the tests and the disposition. The questions were answered and return precautions provided. The patient or family acknowledged understanding and was agreeable with the care plan.         MEDICATIONS GIVEN IN THE EMERGENCY:  Medications   lidocaine 1 % 0.1-1 mL (has no administration in time range)   sodium chloride (PF) 0.9% PF flush 3 mL (3 mLs Intracatheter $Given 5/16/24 2323)   sodium chloride (PF) 0.9% PF flush 3 mL (has no administration in time range)   senna-docusate (SENOKOT-S/PERICOLACE) 8.6-50 MG per tablet 1 tablet (has no administration in time range)     Or   senna-docusate (SENOKOT-S/PERICOLACE) 8.6-50 MG per tablet 2 tablet (has no administration in time range)   calcium carbonate (TUMS) chewable tablet 1,000 mg (has no administration in time range)   enoxaparin ANTICOAGULANT (LOVENOX) injection 40 mg (40 mg Subcutaneous $Given 5/16/24 2332)   sodium chloride 0.9 % infusion ( Intravenous $New Bag 5/16/24 2322)   acetaminophen (TYLENOL) tablet 975 mg (has no administration in time range)   oxyCODONE IR (ROXICODONE) half-tab 2.5 mg (has no administration in time range)   oxyCODONE (ROXICODONE) tablet 5 mg (has no administration in time range)   melatonin tablet 1 mg (has no administration in time range)   polyethylene glycol (MIRALAX) Packet 17 g (has no administration in time range)   bisacodyl (DULCOLAX) suppository 10 mg (has no administration in time range)   prochlorperazine (COMPAZINE) injection 5 mg (has no administration in time range)     Or   prochlorperazine (COMPAZINE) tablet 5 mg (has no administration in time range)     Or    prochlorperazine (COMPAZINE) suppository 12.5 mg (has no administration in time range)   miconazole (MICATIN) 2 % powder (has no administration in time range)   menthol-zinc oxide (CALMOSEPTINE) 0.44-20.6 % ointment OINT (has no administration in time range)   diclofenac (VOLTAREN) 1 % topical gel 2 g (has no administration in time range)   glucose gel 15-30 g (has no administration in time range)     Or   dextrose 50 % injection 25-50 mL (has no administration in time range)     Or   glucagon injection 1 mg (has no administration in time range)   insulin aspart (NovoLOG) injection (RAPID ACTING) (has no administration in time range)   insulin aspart (NovoLOG) injection (RAPID ACTING) (has no administration in time range)   amLODIPine (NORVASC) tablet 5 mg (has no administration in time range)   aspirin EC tablet 81 mg (has no administration in time range)   famotidine (PEPCID) tablet 20 mg (has no administration in time range)   ferrous gluconate (FERGON) tablet 324 mg (has no administration in time range)   fluticasone (FLONASE) 50 MCG/ACT spray 1-2 spray (has no administration in time range)   gabapentin (NEURONTIN) capsule 300 mg (has no administration in time range)   hydrOXYzine HCl (ATARAX) tablet 25 mg (has no administration in time range)   isosorbide mononitrate (IMDUR) 24 hr tablet 30 mg (has no administration in time range)   Lidocaine (LIDOCARE) 4 % Patch 1 patch (has no administration in time range)   lisinopril (ZESTRIL) tablet 10 mg (has no administration in time range)   loratadine (CLARITIN) tablet 10 mg (has no administration in time range)   multivitamin w/minerals (THERA-VIT-M) tablet 1 tablet (has no administration in time range)   sodium bicarbonate tablet 1,950 mg (has no administration in time range)   torsemide (DEMADEX) tablet 20 mg (has no administration in time range)   ursodiol (ACTIGALL) capsule 300 mg (has no administration in time range)   sodium chloride 0.9% BOLUS 1,000 mL (0 mLs  Intravenous Stopped 5/16/24 1735)   morphine (PF) injection 4 mg (4 mg Intravenous $Given 5/16/24 1815)   iopamidol (ISOVUE-370) solution 75 mL (75 mLs Intravenous $Given 5/16/24 2215)       NEW PRESCRIPTIONS STARTED AT TODAY'S ER VISIT  New Prescriptions    No medications on file            =================================================================    HPI    Patient information was obtained from: patient    Use of Intrepreter: N/A         Lance Ibrahim is a 79 year old male who presents fall.  Per patient report, he had a mechanical fall on Monday while putting bed sheets on his bed.  Fell to the ground and endorsed pain in bilateral shoulders and hips thereafter.  Denies hitting his head or neck.  Denies any other new acute pain.  States that due to the pain in his shoulders and hips he was unable to get up and has been laying on the ground for the past 4 days.  Was visited by family today and they called ambulance to have the patient come to the emergency department.  He endorses dark urine and decreased p.o. fluid intake otherwise denies any other associated symptoms.  States shoulder and hip pain worse with movement.  Otherwise denies headache, chest pain, shortness of breath, bloody stools, vomiting or focal weakness.      REVIEW OF SYSTEMS   Constitutional:  Denies fever, chills  Respiratory:  Denies productive cough or increased work of breathing  Cardiovascular:  Denies chest pain, palpitations  GI: Endorses dark urine  Musculoskeletal: Endorses bilateral hip and shoulder pain  Skin:  Denies rash   Neurologic:  Denies focal weakness  All systems negative except as marked.     PAST MEDICAL HISTORY:  Past Medical History:   Diagnosis Date    Anemia     Arthritis     osteoarthritis knees    BPH     CAD (coronary artery disease)     subtotal occlusion in the small distal LAD     Cardiomyopathy     Cerebral artery occlusion with cerebral infarction     TIA 1993, no residual    Cervicalgia     CHF  (congestive heart failure) (H)     Chronic kidney disease     Chronic low back pain     Chronic rhinitis     Gouty arthropathy     hands    Hyperlipidemia     Hypertension     Kidney stone     Nocturia     Obesity     Osteomyelitis (H) 08/2023    Foot    PVD (peripheral vascular disease)     Sleep apnea     doesn't use cpap    TIA (transient ischaemic attack) 1993    Type 2 diabetes mellitus - 11/08/2018    Ureteral stone        PAST SURGICAL HISTORY:  Past Surgical History:   Procedure Laterality Date    AMPUTATE FOOT Right 08/07/2023    Procedure: AMPUTATION, right hallux;  Surgeon: Nakul Salazar DPM;  Location: [facility] OR    AMPUTATE TOE(S) Left 10/15/2018    Procedure: AMPUTATE TOE(S);  Left third toe amputation;  Surgeon: Ayaka Azevedo DPM, Podiatry/Foot and Ankle Surgery;  Location:  OR    ARTHROPLASTY KNEE Right 12/07/2018    Procedure: Right total knee arthroplasty;  Surgeon: Issa Cunha MD;  Location:  OR    COLONOSCOPY  03/09/2013    Procedure: COLONOSCOPY;  COLONOSCOPY;  Surgeon: Chau Hogan MD;  Location:  GI    CV HEART CATHETERIZATION WITH POSSIBLE INTERVENTION Left 03/05/2019    Procedure: Coronary Angiogram;  Surgeon: Nas Linda MD;  Location: [facility] CARDIAC CATH LAB    Diastasis recti repair  1985    ENDOSCOPIC RETROGRADE CHOLANGIOPANCREATOGRAM N/A 04/30/2024    Procedure: ENDOSCOPIC RETROGRADE CHOLANGIOPANCREATOGRAPHY, BILIARY SPHINCTEROTOMY, DILATION, BRUSHING, BIOPSIES with DISTAL EXTRA HEPATIC BILE DUCT STRICTURE;  Surgeon: Aldo Gongora MD;  Location: [facility] OR    ESOPHAGOSCOPY, GASTROSCOPY, DUODENOSCOPY (EGD), COMBINED N/A 04/30/2024    Procedure: ESOPHAGOGASTRODUODENOSCOPY, WITH ENDOSCOPIC ULTRASOUND GUIDANCE;  Surgeon: Aldo Gongora MD;  Location: [facility] OR    EXTRACAPSULAR CATARACT EXTRATION WITH INTRAOCULAR LENS IMPLANT Left 03/13/2017    EXTRACAPSULAR CATARACT EXTRATION WITH INTRAOCULAR LENS IMPLANT Right     FOOT  SURGERY  04/2013    cyst removal     HERNIA REPAIR  07/13/2004    ventral     ORIF Shoulder Left     PROSTATE SURGERY  02/2024    Urolift    SPINAL CORD STIMULATOR IMPLANT  04/03/2024    Ventral hernia repair NOS  1987         CURRENT MEDICATIONS:    Prior to Admission medications    Medication Sig Start Date End Date Taking? Authorizing Provider   amLODIPine (NORVASC) 5 MG tablet TAKE 1 TABLET DAILY 4/3/24   Rickey Adamson MD   aspirin 81 MG EC tablet Take 1 tablet (81 mg) by mouth daily  Patient taking differently: Take 81 mg by mouth daily Stopping 5/13/24 before surgery 5/4/24   Isabelle Vidal MD   blood glucose monitoring (NO BRAND SPECIFIED) meter device kit Use to test blood sugar 2 times daily or as directed. 1/31/22   Anh Spivey APRN CNP   calcium carbonate (TUMS) 500 MG chewable tablet Take 1,000 mg by mouth 3 times daily as needed for heartburn 8/17/23   Reported, Patient   colchicine (COLCYRS) 0.6 MG tablet TAKE 1 TABLET DAILY 2/24/23   Terence Hatch MD   CONTOUR NEXT TEST test strip USE TO TEST BLOOD SUGAR 2 TIMES DAILY OR AS DIRECTED. 2/3/23   Rickey Adamson MD   diclofenac (VOLTAREN) 1 % topical gel Apply 4 g topically 4 times daily as needed for moderate pain    Reported, Patient   emollient (VANICREAM) external cream Apply topically 3 times daily 4/28/24   Candie Goss MD   famotidine (PEPCID) 20 MG tablet Take 1 tablet (20 mg) by mouth 2 times daily as needed (itching) 4/20/24   Dali Rutherford PA-C   Ferrous Gluconate 240 (27 Fe) MG TABS Take 1 tablet by mouth daily     Reported, Patient   fluticasone (FLONASE) 50 MCG/ACT nasal spray Spray 1-2 sprays in nostril daily as needed    Reported, Patient   gabapentin (NEURONTIN) 300 MG capsule Take 1 capsule (300 mg) by mouth 3 times daily as needed 5/2/24   Isabelle Vidal MD   glipiZIDE (GLUCOTROL XL) 2.5 MG 24 hr tablet Take 1 tablet (2.5 mg) by mouth daily 5/2/24   Isabelle Vidal MD   HEMP OIL OR EXTRACT OR OTHER CBD CANNABINOID, NOT  MEDICAL CANNABIS, Take 1 chew tab by mouth at bedtime CBD gummy    Unknown, Entered By History   hydrOXYzine HCl (ATARAX) 25 MG tablet Take 1 tablet (25 mg) by mouth 3 times daily as needed for itching 4/20/24   Dali Rutherford PA-C   isosorbide mononitrate (IMDUR) 30 MG 24 hr tablet TAKE 1 TABLET DAILY 2/15/24   Rickey Adamson MD   Lidocaine (LIDOCARE) 4 % Patch Place 1 patch onto the skin daily as needed To prevent lidocaine toxicity, patient should be patch free for 12 hrs daily.    Reported, Patient   lisinopril (ZESTRIL) 10 MG tablet Take 1 tablet (10 mg) by mouth every 24 hours 10/5/22   Rickey Adamson MD   loratadine (CLARITIN) 10 MG tablet Take 10 mg by mouth daily as needed for allergies    Reported, Patient   melatonin 5 MG CAPS Take 5 mg by mouth at bedtime    Reported, Patient   Microlet Lancets MISC USE TO TEST BLOOD SUGAR TWICE A DAY OR AS DIRECTED 12/4/23   Jeffery Villagran MD   multivitamin w/minerals (THERA-VIT-M) tablet Take 1 tablet by mouth daily Stopping 5/13/24 before surgery    Reported, Patient   nortriptyline (PAMELOR) 10 MG capsule Take 20 mg by mouth nightly as needed    Reported, Patient   Probiotic Product (FORTIFY 30 BILLION PROBIOT 50+) CPDR Take 1 5 Pack by mouth daily 8/2/23   Rickey Adamson MD   Semaglutide (RYBELSUS) 14 MG tablet Take 14 mg by mouth daily  Patient taking differently: Take 14 mg by mouth daily Stopping 5/13/24 before surgery 3/6/24   Rickey Adamson MD   sodium bicarbonate 650 MG tablet Take 3 tablets (1,950 mg) by mouth 3 times daily 4/29/24   Di Diego NP   torsemide (DEMADEX) 20 MG tablet TAKE 1 TABLET DAILY 4/8/24   Rickey Adamson MD   ursodiol (ACTIGALL) 300 MG capsule Take 1 capsule (300 mg) by mouth 2 times daily 5/2/24   Isabelle Vidal MD        ALLERGIES:  Allergies   Allergen Reactions    Ancef [Cefazolin] Rash    Cephalexin Itching and Rash     Allergic reaction required hospitalization 4/2024    Penicillins Anaphylaxis    Sulfa Antibiotics   "    \"itchy rash, swelling of face and hands\"       FAMILY HISTORY:  Family History   Problem Relation Age of Onset    Family History Negative Mother          86 yo    Cancer Father          76 yo brain    Diabetes Maternal Grandfather          89 yo    Alcohol/Drug Paternal Grandfather             Colon Cancer No family hx of        SOCIAL HISTORY:   Social History     Socioeconomic History    Marital status:    Occupational History     Employer: SELF   Tobacco Use    Smoking status: Former     Current packs/day: 0.00     Types: Cigarettes     Quit date: 3/17/1993     Years since quittin.1    Smokeless tobacco: Never   Vaping Use    Vaping status: Never Used   Substance and Sexual Activity    Alcohol use: Not Currently    Drug use: No    Sexual activity: Never     Social Determinants of Health     Interpersonal Safety: Low Risk  (3/27/2024)    Interpersonal Safety     Do you feel physically and emotionally safe where you currently live?: Yes     Within the past 12 months, have you been hit, slapped, kicked or otherwise physically hurt by someone?: No     Within the past 12 months, have you been humiliated or emotionally abused in other ways by your partner or ex-partner?: No       VITALS:  Patient Vitals for the past 24 hrs:   BP Temp Temp src Pulse Resp SpO2 Height Weight   24 2318 115/58 98.4  F (36.9  C) Oral 107 16 95 % -- --   24 -- -- -- 105 16 95 % -- --   248 125/60 -- -- 104 18 95 % -- --   248 103/57 -- -- 114 21 94 % -- --   24 -- -- -- 115 18 93 % -- --   24 -- -- -- 116 17 90 % -- --   24 -- -- -- 113 17 91 % -- --   24 (!) 152/68 -- -- 108 12 92 % -- --   24 (!) 157/72 -- -- 104 -- 94 % -- --   24 -- -- -- 103 -- 96 % -- --   24 193 (!) 144/67 -- -- 105 26 92 % -- --   05/16/24 1918 -- -- -- 105 22 93 % -- --   24 -- -- -- 110 18 94 % -- -- "   05/16/24 1902 -- -- -- 104 15 96 % -- --   05/16/24 1838 -- -- -- 101 14 94 % -- --   05/16/24 1700 (!) 156/72 -- -- 105 17 94 % -- --   05/16/24 1653 -- -- -- 105 14 95 % -- --   05/16/24 1615 -- -- -- 102 -- 94 % -- --   05/16/24 1614 138/67 -- -- 101 18 95 % -- --   05/16/24 1607 -- -- -- 114 20 -- -- --   05/16/24 1545 138/65 -- -- 106 -- (!) 88 % -- --   05/16/24 1521 (!) 155/71 98  F (36.7  C) Oral 114 (!) 114 92 % 1.829 m (6') 95.3 kg (210 lb)        PHYSICAL EXAM    Constitutional:  Awake, alert, in no apparent distress  HENT:  Normocephalic, Atraumatic. Bilateral external ears normal. Oropharynx moist. Nose normal. Neck- Normal range of motion with no guarding, No midline cervical tenderness, Supple, No stridor.   Eyes:  PERRL, EOMI with no signs of entrapment, Conjunctiva normal, No discharge.   Respiratory:  Normal breath sounds, No respiratory distress, No wheezing.    Cardiovascular: Tachycardia, Normal rhythm, No appreciable rubs or gallops.   GI:  Soft, No tenderness, No distension, No palpable masses  Musculoskeletal:  Intact distal pulses, No edema.  No gross deformities, decent range of motion bilateral hip and shoulders.  Back-nontender along cervical and lumbar spine with minimal tenderness to the thoracic spine, no step-offs or signs of trauma.  Integument:  Warm, Dry, No erythema, No rash.   Neurologic:  Alert & oriented, Normal motor function, Normal sensory function, No focal deficits noted.   Psychiatric:  Affect normal, Judgment normal, Mood normal.     LAB:  All pertinent labs reviewed and interpreted.  Results for orders placed or performed during the hospital encounter of 05/16/24   CT Head w/o Contrast    Impression    IMPRESSION:  1.  No CT evidence for acute intracranial process.  2.  Brain atrophy and presumed chronic microvascular ischemic changes as above.   CT Cervical Spine w/o Contrast    Impression    IMPRESSION:  CERVICAL SPINE CT:  1.  No fracture or posttraumatic  subluxation.    THORACIC SPINE CT:  1.  No fracture or posttraumatic subluxation.    LUMBAR SPINE CT:  1.  No fracture or posttraumatic subluxation.     XR Hip Bilateral 1 View Each    Impression    IMPRESSION: Degenerative change both hip joints. No evidence for fracture or dislocation. Pelvis negative for fracture.   CT Thoracic Spine w/o Contrast    Impression    IMPRESSION:  CERVICAL SPINE CT:  1.  No fracture or posttraumatic subluxation.    THORACIC SPINE CT:  1.  No fracture or posttraumatic subluxation.    LUMBAR SPINE CT:  1.  No fracture or posttraumatic subluxation.     CT Lumbar Spine w/o Contrast    Impression    IMPRESSION:  CERVICAL SPINE CT:  1.  No fracture or posttraumatic subluxation.    THORACIC SPINE CT:  1.  No fracture or posttraumatic subluxation.    LUMBAR SPINE CT:  1.  No fracture or posttraumatic subluxation.     XR Shoulder Left 2 Views    Impression    IMPRESSION: Postoperative changes to the humeral head with screw fixation. Severe superimposed degenerative change at the glenohumeral joint but no evidence for acute-appearing fracture or dislocation. Chronic-appearing irregularity of the greater   tuberosity likely has a posttraumatic origin but appears chronic. Mild hypertrophic change at the AC joint. The clavicle is negative.   XR Shoulder Right 2 Views    Impression    IMPRESSION: The right glenohumeral and acromioclavicular joints are negative for fracture or dislocation. There is degenerative change of both joints. The right clavicle is negative for fracture.   CT Chest Pulmonary Embolism w Contrast    Impression    IMPRESSION:  1.  Negative for pulmonary embolism.    2.  Emphysematous changes in the lungs.    3.  Subpleural scarring and atelectasis in the lungs. No evidence for pneumonitis.    4.  Prominent coronary artery calcification.    5.  Splenic enlargement.    6.  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   Comprehensive metabolic panel    Result Value Ref Range    Sodium 142 135 - 145 mmol/L    Potassium 3.4 3.4 - 5.3 mmol/L    Carbon Dioxide (CO2) 21 (L) 22 - 29 mmol/L    Anion Gap 17 (H) 7 - 15 mmol/L    Urea Nitrogen 55.8 (H) 8.0 - 23.0 mg/dL    Creatinine 1.98 (H) 0.67 - 1.17 mg/dL    GFR Estimate 34 (L) >60 mL/min/1.73m2    Calcium 8.9 8.8 - 10.2 mg/dL    Chloride 104 98 - 107 mmol/L    Glucose 270 (H) 70 - 99 mg/dL    Alkaline Phosphatase 227 (H) 40 - 150 U/L    AST 60 (H) 0 - 45 U/L    ALT 74 (H) 0 - 70 U/L    Protein Total 6.7 6.4 - 8.3 g/dL    Albumin 2.9 (L) 3.5 - 5.2 g/dL    Bilirubin Total 1.7 (H) <=1.2 mg/dL   Result Value Ref Range    Troponin T, High Sensitivity 32 (H) <=22 ng/L   Result Value Ref Range    Magnesium 2.2 1.7 - 2.3 mg/dL   Result Value Ref Range     (H) 39 - 308 U/L   UA with Microscopic reflex to Culture    Specimen: Urine, NOS   Result Value Ref Range    Color Urine Yellow Colorless, Straw, Light Yellow, Yellow    Appearance Urine Turbid (A) Clear    Glucose Urine Negative Negative mg/dL    Bilirubin Urine Negative Negative    Ketones Urine Trace (A) Negative mg/dL    Specific Gravity Urine 1.016 1.001 - 1.030    Blood Urine 0.2 mg/dL (A) Negative    pH Urine 5.5 5.0 - 7.0    Protein Albumin Urine 100 (A) Negative mg/dL    Urobilinogen Urine <2.0 <2.0 mg/dL    Nitrite Urine Negative Negative    Leukocyte Esterase Urine 250 [place]/uL (A) Negative    Bacteria Urine Many (A) None Seen /HPF    Mucus Urine Present (A) None Seen /LPF    RBC Urine 1 <=2 /HPF    WBC Urine 31 (H) <=5 /HPF    Squamous Epithelials Urine 2 (H) <=1 /HPF   CBC with platelets and differential   Result Value Ref Range    WBC Count 8.6 4.0 - 11.0 10e3/uL    RBC Count 3.73 (L) 4.40 - 5.90 10e6/uL    Hemoglobin 10.7 (L) 13.3 - 17.7 g/dL    Hematocrit 33.6 (L) 40.0 - 53.0 %    MCV 90 78 - 100 fL    MCH 28.7 26.5 - 33.0 pg    MCHC 31.8 31.5 - 36.5 g/dL    RDW 17.2 (H) 10.0 - 15.0 %    Platelet Count 185 150 - 450 10e3/uL    % Neutrophils 76 %    %  Lymphocytes 13 %    % Monocytes 6 %    % Eosinophils 0 %    % Basophils 1 %    % Immature Granulocytes 5 %    NRBCs per 100 WBC 0 <1 /100    Absolute Neutrophils 6.5 1.6 - 8.3 10e3/uL    Absolute Lymphocytes 1.1 0.8 - 5.3 10e3/uL    Absolute Monocytes 0.5 0.0 - 1.3 10e3/uL    Absolute Eosinophils 0.0 0.0 - 0.7 10e3/uL    Absolute Basophils 0.1 0.0 - 0.2 10e3/uL    Absolute Immature Granulocytes 0.4 <=0.4 10e3/uL    Absolute NRBCs 0.0 10e3/uL   Result Value Ref Range    Troponin T, High Sensitivity 34 (H) <=22 ng/L   Symptomatic Influenza A/B, RSV, & SARS-CoV2 PCR (COVID-19) Nasopharyngeal    Specimen: Nasopharyngeal; Swab   Result Value Ref Range    Influenza A PCR Negative Negative    Influenza B PCR Negative Negative    RSV PCR Negative Negative    SARS CoV2 PCR Negative Negative   Creatinine   Result Value Ref Range    Creatinine 1.91 (H) 0.67 - 1.17 mg/dL    GFR Estimate 35 (L) >60 mL/min/1.73m2   Result Value Ref Range    Phosphorus 4.0 2.5 - 4.5 mg/dL   Nt probnp inpatient   Result Value Ref Range    N terminal Pro BNP Inpatient 3,157 (H) 0 - 1,800 pg/mL   Glucose by meter   Result Value Ref Range    GLUCOSE BY METER POCT 218 (H) 70 - 99 mg/dL   ECG 12-LEAD WITH MUSE ([facility])   Result Value Ref Range    Systolic Blood Pressure 138 mmHg    Diastolic Blood Pressure 67 mmHg    Ventricular Rate 101 BPM    Atrial Rate 101 BPM    OH Interval 182 ms    QRS Duration 120 ms     ms    QTc 482 ms    P Axis 67 degrees    R AXIS -28 degrees    T Axis 110 degrees    Interpretation ECG       Sinus tachycardia  Incomplete left bundle branch block  Nonspecific ST and T wave abnormality  Abnormal ECG  When compared with ECG of 27-FEB-2024 11:45,  OH interval has decreased  Inverted T waves have replaced nonspecific T wave abnormality in Lateral leads  Confirmed by SEE ED PROVIDER NOTE FOR, ECG INTERPRETATION ([id number]),  MARIELLA SIGALA ([id number]) on 5/16/2024 5:31:58 PM         RADIOLOGY:  CT Chest Pulmonary Embolism w  Contrast   Final Result   IMPRESSION:   1.  Negative for pulmonary embolism.      2.  Emphysematous changes in the lungs.      3.  Subpleural scarring and atelectasis in the lungs. No evidence for pneumonitis.      4.  Prominent coronary artery calcification.      5.  Splenic enlargement.      6.  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      XR Hip Bilateral 1 View Each   Final Result   IMPRESSION: Degenerative change both hip joints. No evidence for fracture or dislocation. Pelvis negative for fracture.      XR Shoulder Right 2 Views   Final Result   IMPRESSION: The right glenohumeral and acromioclavicular joints are negative for fracture or dislocation. There is degenerative change of both joints. The right clavicle is negative for fracture.      XR Shoulder Left 2 Views   Final Result   IMPRESSION: Postoperative changes to the humeral head with screw fixation. Severe superimposed degenerative change at the glenohumeral joint but no evidence for acute-appearing fracture or dislocation. Chronic-appearing irregularity of the greater    tuberosity likely has a posttraumatic origin but appears chronic. Mild hypertrophic change at the AC joint. The clavicle is negative.      CT Cervical Spine w/o Contrast   Final Result   IMPRESSION:   CERVICAL SPINE CT:   1.  No fracture or posttraumatic subluxation.      THORACIC SPINE CT:   1.  No fracture or posttraumatic subluxation.      LUMBAR SPINE CT:   1.  No fracture or posttraumatic subluxation.         CT Lumbar Spine w/o Contrast   Final Result   IMPRESSION:   CERVICAL SPINE CT:   1.  No fracture or posttraumatic subluxation.      THORACIC SPINE CT:   1.  No fracture or posttraumatic subluxation.      LUMBAR SPINE CT:   1.  No fracture or posttraumatic subluxation.         CT Thoracic Spine w/o Contrast   Final Result   IMPRESSION:   CERVICAL SPINE CT:   1.  No fracture or posttraumatic subluxation.      THORACIC SPINE CT:   1.  No  fracture or posttraumatic subluxation.      LUMBAR SPINE CT:   1.  No fracture or posttraumatic subluxation.         CT Head w/o Contrast   Final Result   IMPRESSION:   1.  No CT evidence for acute intracranial process.   2.  Brain atrophy and presumed chronic microvascular ischemic changes as above.             EKG:    Sinus tachycardia, no specific ST acute ischemic changes, no concerning dysrhythmias, significant artifact secondary to patient's spinal stimulator, when compared to ECG of February 27, 2024, no specific ST acute ischemic changes appreciated  I have independently reviewed and interpreted the EKG(s) documented above.    PROCEDURES:        Walk-in Appointment Scheduler [place] [facility] Documentation:     IALEXX MD, am serving as a scribe to document services personally performed by Alexx Adair MD, based on my observation and the provider's statements to me. I, Alexx Adair MD attest that ALEXX ADAIR MD is acting in a scribe capacity, has observed my performance of the services and has documented them in accordance with my direction.    Alexx Adair M.D.  Emergency Medicine  [facility] EMERGENCY ROOM  [id number] [facility] [id number] [phone]  Dept: [phone]       Alexx Adair MD  05/16/24 0560

## 2024-05-16 NOTE — MEDICATION SCRIBE - ADMISSION MEDICATION HISTORY
Medication Scribe Admission Medication History    Admission medication history is complete. The information provided in this note is only as accurate as the sources available at the time of the update.    Information Source(s): Patient, Hospital records, and CareEverywhere/SureScripts via in-person    Pertinent Information: Patient reports not having any medications since 5/13 due to fall. Reports that he was discharged on 5/2 and no medication changes have been made.     Changes made to PTA medication list:  Added: None  Deleted: None  Changed: None    Allergies reviewed with patient and updates made in EHR: yes    Medication History Completed By: Jason Gilbert 5/16/2024 5:03 PM    PTA Med List   Medication Sig Last Dose    amLODIPine (NORVASC) 5 MG tablet TAKE 1 TABLET DAILY Past Week at 5/13    aspirin 81 MG EC tablet Take 1 tablet (81 mg) by mouth daily (Patient taking differently: Take 81 mg by mouth daily Stopping 5/13/24 before surgery) Past Week at 5/13    calcium carbonate (TUMS) 500 MG chewable tablet Take 1,000 mg by mouth 3 times daily as needed for heartburn Past Week at 5/13    colchicine (COLCYRS) 0.6 MG tablet TAKE 1 TABLET DAILY Past Week at 5/13    diclofenac (VOLTAREN) 1 % topical gel Apply 4 g topically 4 times daily as needed for moderate pain Past Week at 5/13    emollient (VANICREAM) external cream Apply topically 3 times daily Past Week at 5/13    famotidine (PEPCID) 20 MG tablet Take 1 tablet (20 mg) by mouth 2 times daily as needed (itching) Past Week at 5/13    Ferrous Gluconate 240 (27 Fe) MG TABS Take 1 tablet by mouth daily  Past Week at 5/13    fluticasone (FLONASE) 50 MCG/ACT nasal spray Spray 1-2 sprays in nostril daily as needed Past Month at PRN    gabapentin (NEURONTIN) 300 MG capsule Take 1 capsule (300 mg) by mouth 3 times daily as needed Past Week at 5/13    glipiZIDE (GLUCOTROL XL) 2.5 MG 24 hr tablet Take 1 tablet (2.5 mg) by mouth daily Past Week at 5/13    HEMP OIL OR  EXTRACT OR OTHER CBD CANNABINOID, NOT MEDICAL CANNABIS, Take 1 chew tab by mouth at bedtime CBD gummy Past Week at 5/13    hydrOXYzine HCl (ATARAX) 25 MG tablet Take 1 tablet (25 mg) by mouth 3 times daily as needed for itching Past Week at 5/13    isosorbide mononitrate (IMDUR) 30 MG 24 hr tablet TAKE 1 TABLET DAILY Past Week at 5/13    Lidocaine (LIDOCARE) 4 % Patch Place 1 patch onto the skin daily as needed To prevent lidocaine toxicity, patient should be patch free for 12 hrs daily. More than a month at few weeks    lisinopril (ZESTRIL) 10 MG tablet Take 1 tablet (10 mg) by mouth every 24 hours Past Week at 5/13    loratadine (CLARITIN) 10 MG tablet Take 10 mg by mouth daily as needed for allergies Past Week at 5/13    melatonin 5 MG CAPS Take 5 mg by mouth at bedtime Past Week at 5/13    multivitamin w/minerals (THERA-VIT-M) tablet Take 1 tablet by mouth daily Stopping 5/13/24 before surgery Past Week at 5/13    nortriptyline (PAMELOR) 10 MG capsule Take 20 mg by mouth nightly as needed Past Week at 5/13    Probiotic Product (FORTIFY 30 BILLION PROBIOT 50+) CPDR Take 1 5 Pack by mouth daily Past Week at 5/13    Semaglutide (RYBELSUS) 14 MG tablet Take 14 mg by mouth daily (Patient taking differently: Take 14 mg by mouth daily Stopping 5/13/24 before surgery) Past Week at 5/13    sodium bicarbonate 650 MG tablet Take 3 tablets (1,950 mg) by mouth 3 times daily Past Week at 5/13    torsemide (DEMADEX) 20 MG tablet TAKE 1 TABLET DAILY Past Week at 5/13    ursodiol (ACTIGALL) 300 MG capsule Take 1 capsule (300 mg) by mouth 2 times daily Past Week at 5/13

## 2024-05-17 ENCOUNTER — APPOINTMENT (OUTPATIENT)
Dept: PHYSICAL THERAPY | Facility: CLINIC | Age: 79
DRG: 871 | End: 2024-05-17
Payer: COMMERCIAL

## 2024-05-17 ENCOUNTER — APPOINTMENT (OUTPATIENT)
Dept: OCCUPATIONAL THERAPY | Facility: CLINIC | Age: 79
DRG: 871 | End: 2024-05-17
Payer: COMMERCIAL

## 2024-05-17 ENCOUNTER — APPOINTMENT (OUTPATIENT)
Dept: RADIOLOGY | Facility: CLINIC | Age: 79
DRG: 871 | End: 2024-05-17
Attending: STUDENT IN AN ORGANIZED HEALTH CARE EDUCATION/TRAINING PROGRAM
Payer: COMMERCIAL

## 2024-05-17 PROBLEM — N18.32 STAGE 3B CHRONIC KIDNEY DISEASE (H): Status: ACTIVE | Noted: 2020-02-05

## 2024-05-17 PROBLEM — N28.9 ACUTE RENAL INSUFFICIENCY: Status: ACTIVE | Noted: 2021-12-03

## 2024-05-17 LAB
ALBUMIN SERPL BCG-MCNC: 2.7 G/DL (ref 3.5–5.2)
ALP SERPL-CCNC: 202 U/L (ref 40–150)
ALT SERPL W P-5'-P-CCNC: 53 U/L (ref 0–70)
ANION GAP SERPL CALCULATED.3IONS-SCNC: 14 MMOL/L (ref 7–15)
ANION GAP SERPL CALCULATED.3IONS-SCNC: 15 MMOL/L (ref 7–15)
AST SERPL W P-5'-P-CCNC: 48 U/L (ref 0–45)
ATRIAL RATE - MUSE: 95 BPM
BILIRUB SERPL-MCNC: 1.3 MG/DL
BUN SERPL-MCNC: 53.8 MG/DL (ref 8–23)
BUN SERPL-MCNC: 54.9 MG/DL (ref 8–23)
CALCIUM SERPL-MCNC: 7.6 MG/DL (ref 8.8–10.2)
CALCIUM SERPL-MCNC: 8.4 MG/DL (ref 8.8–10.2)
CHLORIDE SERPL-SCNC: 103 MMOL/L (ref 98–107)
CHLORIDE SERPL-SCNC: 106 MMOL/L (ref 98–107)
CREAT SERPL-MCNC: 1.95 MG/DL (ref 0.67–1.17)
CREAT SERPL-MCNC: 1.97 MG/DL (ref 0.67–1.17)
DEPRECATED HCO3 PLAS-SCNC: 20 MMOL/L (ref 22–29)
DEPRECATED HCO3 PLAS-SCNC: 20 MMOL/L (ref 22–29)
DIASTOLIC BLOOD PRESSURE - MUSE: 46 MMHG
EGFRCR SERPLBLD CKD-EPI 2021: 34 ML/MIN/1.73M2
EGFRCR SERPLBLD CKD-EPI 2021: 34 ML/MIN/1.73M2
ERYTHROCYTE [DISTWIDTH] IN BLOOD BY AUTOMATED COUNT: 17.4 % (ref 10–15)
ERYTHROCYTE [DISTWIDTH] IN BLOOD BY AUTOMATED COUNT: 17.5 % (ref 10–15)
GLUCOSE BLDC GLUCOMTR-MCNC: 205 MG/DL (ref 70–99)
GLUCOSE BLDC GLUCOMTR-MCNC: 206 MG/DL (ref 70–99)
GLUCOSE BLDC GLUCOMTR-MCNC: 228 MG/DL (ref 70–99)
GLUCOSE BLDC GLUCOMTR-MCNC: 235 MG/DL (ref 70–99)
GLUCOSE BLDC GLUCOMTR-MCNC: 273 MG/DL (ref 70–99)
GLUCOSE SERPL-MCNC: 225 MG/DL (ref 70–99)
GLUCOSE SERPL-MCNC: 233 MG/DL (ref 70–99)
HCT VFR BLD AUTO: 26.2 % (ref 40–53)
HCT VFR BLD AUTO: 31.7 % (ref 40–53)
HGB BLD-MCNC: 8.4 G/DL (ref 13.3–17.7)
HGB BLD-MCNC: 9.9 G/DL (ref 13.3–17.7)
INTERPRETATION ECG - MUSE: NORMAL
LACTATE SERPL-SCNC: 1.3 MMOL/L (ref 0.7–2)
MAGNESIUM SERPL-MCNC: 1.8 MG/DL (ref 1.7–2.3)
MCH RBC QN AUTO: 28.2 PG (ref 26.5–33)
MCH RBC QN AUTO: 28.9 PG (ref 26.5–33)
MCHC RBC AUTO-ENTMCNC: 31.2 G/DL (ref 31.5–36.5)
MCHC RBC AUTO-ENTMCNC: 32.1 G/DL (ref 31.5–36.5)
MCV RBC AUTO: 90 FL (ref 78–100)
MCV RBC AUTO: 90 FL (ref 78–100)
NT-PROBNP SERPL-MCNC: 2058 PG/ML (ref 0–1800)
P AXIS - MUSE: 48 DEGREES
PLATELET # BLD AUTO: 155 10E3/UL (ref 150–450)
PLATELET # BLD AUTO: 168 10E3/UL (ref 150–450)
POTASSIUM SERPL-SCNC: 2.9 MMOL/L (ref 3.4–5.3)
POTASSIUM SERPL-SCNC: 3.4 MMOL/L (ref 3.4–5.3)
PR INTERVAL - MUSE: 182 MS
PROT SERPL-MCNC: 6.2 G/DL (ref 6.4–8.3)
QRS DURATION - MUSE: 124 MS
QT - MUSE: 398 MS
QTC - MUSE: 500 MS
R AXIS - MUSE: -15 DEGREES
RBC # BLD AUTO: 2.91 10E6/UL (ref 4.4–5.9)
RBC # BLD AUTO: 3.51 10E6/UL (ref 4.4–5.9)
SODIUM SERPL-SCNC: 137 MMOL/L (ref 135–145)
SODIUM SERPL-SCNC: 141 MMOL/L (ref 135–145)
SYSTOLIC BLOOD PRESSURE - MUSE: 76 MMHG
T AXIS - MUSE: 110 DEGREES
TROPONIN T SERPL HS-MCNC: 43 NG/L
TROPONIN T SERPL HS-MCNC: 51 NG/L
VENTRICULAR RATE- MUSE: 95 BPM
WBC # BLD AUTO: 10.2 10E3/UL (ref 4–11)
WBC # BLD AUTO: 8.8 10E3/UL (ref 4–11)

## 2024-05-17 PROCEDURE — 97166 OT EVAL MOD COMPLEX 45 MIN: CPT | Mod: GO | Performed by: OCCUPATIONAL THERAPIST

## 2024-05-17 PROCEDURE — 999N000157 HC STATISTIC RCP TIME EA 10 MIN

## 2024-05-17 PROCEDURE — 250N000009 HC RX 250: Performed by: STUDENT IN AN ORGANIZED HEALTH CARE EDUCATION/TRAINING PROGRAM

## 2024-05-17 PROCEDURE — 250N000009 HC RX 250

## 2024-05-17 PROCEDURE — 83880 ASSAY OF NATRIURETIC PEPTIDE: CPT

## 2024-05-17 PROCEDURE — 83605 ASSAY OF LACTIC ACID: CPT

## 2024-05-17 PROCEDURE — 36569 INSJ PICC 5 YR+ W/O IMAGING: CPT

## 2024-05-17 PROCEDURE — 82962 GLUCOSE BLOOD TEST: CPT

## 2024-05-17 PROCEDURE — 258N000003 HC RX IP 258 OP 636: Performed by: STUDENT IN AN ORGANIZED HEALTH CARE EDUCATION/TRAINING PROGRAM

## 2024-05-17 PROCEDURE — 250N000011 HC RX IP 250 OP 636: Performed by: STUDENT IN AN ORGANIZED HEALTH CARE EDUCATION/TRAINING PROGRAM

## 2024-05-17 PROCEDURE — 85027 COMPLETE CBC AUTOMATED: CPT

## 2024-05-17 PROCEDURE — 84484 ASSAY OF TROPONIN QUANT: CPT

## 2024-05-17 PROCEDURE — 200N000001 HC R&B ICU

## 2024-05-17 PROCEDURE — 87186 SC STD MICRODIL/AGAR DIL: CPT

## 2024-05-17 PROCEDURE — 83735 ASSAY OF MAGNESIUM: CPT | Performed by: STUDENT IN AN ORGANIZED HEALTH CARE EDUCATION/TRAINING PROGRAM

## 2024-05-17 PROCEDURE — 999N000065 XR CHEST PORT 1 VIEW

## 2024-05-17 PROCEDURE — 250N000011 HC RX IP 250 OP 636: Mod: JZ | Performed by: STUDENT IN AN ORGANIZED HEALTH CARE EDUCATION/TRAINING PROGRAM

## 2024-05-17 PROCEDURE — 87149 DNA/RNA DIRECT PROBE: CPT

## 2024-05-17 PROCEDURE — 36415 COLL VENOUS BLD VENIPUNCTURE: CPT

## 2024-05-17 PROCEDURE — 258N000003 HC RX IP 258 OP 636

## 2024-05-17 PROCEDURE — 250N000012 HC RX MED GY IP 250 OP 636 PS 637

## 2024-05-17 PROCEDURE — 97162 PT EVAL MOD COMPLEX 30 MIN: CPT | Mod: GP

## 2024-05-17 PROCEDURE — 3E033XZ INTRODUCTION OF VASOPRESSOR INTO PERIPHERAL VEIN, PERCUTANEOUS APPROACH: ICD-10-PCS | Performed by: FAMILY MEDICINE

## 2024-05-17 PROCEDURE — 99223 1ST HOSP IP/OBS HIGH 75: CPT | Mod: GC

## 2024-05-17 PROCEDURE — 272N000452 HC KIT SHRLOCK 5FR POWER PICC TRIPLE LUMEN

## 2024-05-17 PROCEDURE — 71045 X-RAY EXAM CHEST 1 VIEW: CPT

## 2024-05-17 PROCEDURE — 250N000013 HC RX MED GY IP 250 OP 250 PS 637

## 2024-05-17 PROCEDURE — 250N000009 HC RX 250: Performed by: SURGERY

## 2024-05-17 PROCEDURE — 97530 THERAPEUTIC ACTIVITIES: CPT | Mod: GP

## 2024-05-17 PROCEDURE — 94640 AIRWAY INHALATION TREATMENT: CPT | Mod: 76

## 2024-05-17 PROCEDURE — 82040 ASSAY OF SERUM ALBUMIN: CPT

## 2024-05-17 PROCEDURE — 94640 AIRWAY INHALATION TREATMENT: CPT

## 2024-05-17 PROCEDURE — 93005 ELECTROCARDIOGRAM TRACING: CPT

## 2024-05-17 RX ORDER — NALOXONE HYDROCHLORIDE 0.4 MG/ML
0.4 INJECTION, SOLUTION INTRAMUSCULAR; INTRAVENOUS; SUBCUTANEOUS
Status: DISCONTINUED | OUTPATIENT
Start: 2024-05-17 | End: 2024-05-22 | Stop reason: HOSPADM

## 2024-05-17 RX ORDER — NOREPINEPHRINE BITARTRATE 0.02 MG/ML
.01-.6 INJECTION, SOLUTION INTRAVENOUS CONTINUOUS
Status: DISCONTINUED | OUTPATIENT
Start: 2024-05-17 | End: 2024-05-22 | Stop reason: HOSPADM

## 2024-05-17 RX ORDER — LEVOFLOXACIN 5 MG/ML
500 INJECTION, SOLUTION INTRAVENOUS ONCE
Status: COMPLETED | OUTPATIENT
Start: 2024-05-17 | End: 2024-05-17

## 2024-05-17 RX ORDER — IPRATROPIUM BROMIDE AND ALBUTEROL SULFATE 2.5; .5 MG/3ML; MG/3ML
3 SOLUTION RESPIRATORY (INHALATION)
Status: DISCONTINUED | OUTPATIENT
Start: 2024-05-17 | End: 2024-05-18

## 2024-05-17 RX ORDER — HEPARIN SODIUM 5000 [USP'U]/.5ML
5000 INJECTION, SOLUTION INTRAVENOUS; SUBCUTANEOUS EVERY 8 HOURS
Status: DISCONTINUED | OUTPATIENT
Start: 2024-05-17 | End: 2024-05-20

## 2024-05-17 RX ORDER — NALOXONE HYDROCHLORIDE 0.4 MG/ML
0.2 INJECTION, SOLUTION INTRAMUSCULAR; INTRAVENOUS; SUBCUTANEOUS
Status: DISCONTINUED | OUTPATIENT
Start: 2024-05-17 | End: 2024-05-22 | Stop reason: HOSPADM

## 2024-05-17 RX ORDER — HYDROMORPHONE HYDROCHLORIDE 2 MG/1
2 TABLET ORAL
Status: DISCONTINUED | OUTPATIENT
Start: 2024-05-17 | End: 2024-05-18

## 2024-05-17 RX ORDER — NICOTINE POLACRILEX 4 MG
15-30 LOZENGE BUCCAL
Status: DISCONTINUED | OUTPATIENT
Start: 2024-05-17 | End: 2024-05-17

## 2024-05-17 RX ORDER — VANCOMYCIN HYDROCHLORIDE 1 G/200ML
1000 INJECTION, SOLUTION INTRAVENOUS EVERY 24 HOURS
Status: DISCONTINUED | OUTPATIENT
Start: 2024-05-18 | End: 2024-05-18

## 2024-05-17 RX ORDER — DEXTROSE MONOHYDRATE 25 G/50ML
25-50 INJECTION, SOLUTION INTRAVENOUS
Status: DISCONTINUED | OUTPATIENT
Start: 2024-05-17 | End: 2024-05-17

## 2024-05-17 RX ORDER — POTASSIUM CHLORIDE 7.45 MG/ML
10 INJECTION INTRAVENOUS
Status: COMPLETED | OUTPATIENT
Start: 2024-05-17 | End: 2024-05-18

## 2024-05-17 RX ORDER — CEFEPIME HYDROCHLORIDE 2 G/1
2 INJECTION, POWDER, FOR SOLUTION INTRAVENOUS EVERY 12 HOURS
Status: DISCONTINUED | OUTPATIENT
Start: 2024-05-17 | End: 2024-05-19

## 2024-05-17 RX ORDER — LIDOCAINE 40 MG/G
CREAM TOPICAL
Status: ACTIVE | OUTPATIENT
Start: 2024-05-17 | End: 2024-05-20

## 2024-05-17 RX ORDER — HYDROMORPHONE HCL IN WATER/PF 6 MG/30 ML
0.2 PATIENT CONTROLLED ANALGESIA SYRINGE INTRAVENOUS
Status: DISCONTINUED | OUTPATIENT
Start: 2024-05-17 | End: 2024-05-20

## 2024-05-17 RX ORDER — LEVOFLOXACIN 5 MG/ML
250 INJECTION, SOLUTION INTRAVENOUS EVERY 24 HOURS
Status: DISCONTINUED | OUTPATIENT
Start: 2024-05-18 | End: 2024-05-17

## 2024-05-17 RX ORDER — ROPIVACAINE IN 0.9% SOD CHL/PF 0.1 %
.03-.125 PLASTIC BAG, INJECTION (ML) EPIDURAL CONTINUOUS
Status: DISCONTINUED | OUTPATIENT
Start: 2024-05-17 | End: 2024-05-17 | Stop reason: ALTCHOICE

## 2024-05-17 RX ORDER — LEVOFLOXACIN 5 MG/ML
750 INJECTION, SOLUTION INTRAVENOUS EVERY 24 HOURS
Status: DISCONTINUED | OUTPATIENT
Start: 2024-05-17 | End: 2024-05-17 | Stop reason: DRUGHIGH

## 2024-05-17 RX ADMIN — URSOSIOL 300 MG: 300 CAPSULE ORAL at 08:43

## 2024-05-17 RX ADMIN — URSOSIOL 300 MG: 300 CAPSULE ORAL at 20:47

## 2024-05-17 RX ADMIN — ISOSORBIDE MONONITRATE 30 MG: 30 TABLET, EXTENDED RELEASE ORAL at 08:44

## 2024-05-17 RX ADMIN — HYDROXYZINE HYDROCHLORIDE 25 MG: 25 TABLET ORAL at 14:47

## 2024-05-17 RX ADMIN — SODIUM BICARBONATE 650 MG TABLET 1950 MG: at 08:43

## 2024-05-17 RX ADMIN — IPRATROPIUM BROMIDE AND ALBUTEROL SULFATE 3 ML: .5; 3 SOLUTION RESPIRATORY (INHALATION) at 07:52

## 2024-05-17 RX ADMIN — INSULIN ASPART 2 UNITS: 100 INJECTION, SOLUTION INTRAVENOUS; SUBCUTANEOUS at 08:55

## 2024-05-17 RX ADMIN — POTASSIUM CHLORIDE 10 MEQ: 7.46 INJECTION, SOLUTION INTRAVENOUS at 23:25

## 2024-05-17 RX ADMIN — DICLOFENAC 2 G: 10 GEL TOPICAL at 20:47

## 2024-05-17 RX ADMIN — SODIUM CHLORIDE 1000 ML: 9 INJECTION, SOLUTION INTRAVENOUS at 16:31

## 2024-05-17 RX ADMIN — TORSEMIDE 20 MG: 20 TABLET ORAL at 08:44

## 2024-05-17 RX ADMIN — IPRATROPIUM BROMIDE AND ALBUTEROL SULFATE 3 ML: .5; 3 SOLUTION RESPIRATORY (INHALATION) at 21:24

## 2024-05-17 RX ADMIN — NOREPINEPHRINE BITARTRATE 0.03 MCG/KG/MIN: 0.02 INJECTION, SOLUTION INTRAVENOUS at 16:59

## 2024-05-17 RX ADMIN — ACETAMINOPHEN 975 MG: 325 TABLET ORAL at 14:47

## 2024-05-17 RX ADMIN — HYDROXYZINE HYDROCHLORIDE 25 MG: 25 TABLET ORAL at 08:49

## 2024-05-17 RX ADMIN — ASPIRIN 81 MG: 81 TABLET, COATED ORAL at 08:43

## 2024-05-17 RX ADMIN — HEPARIN SODIUM 5000 UNITS: 5000 INJECTION, SOLUTION INTRAVENOUS; SUBCUTANEOUS at 22:21

## 2024-05-17 RX ADMIN — POTASSIUM CHLORIDE 10 MEQ: 7.46 INJECTION, SOLUTION INTRAVENOUS at 19:28

## 2024-05-17 RX ADMIN — POTASSIUM CHLORIDE 10 MEQ: 7.46 INJECTION, SOLUTION INTRAVENOUS at 18:17

## 2024-05-17 RX ADMIN — GABAPENTIN 300 MG: 300 CAPSULE ORAL at 08:49

## 2024-05-17 RX ADMIN — DICLOFENAC 2 G: 10 GEL TOPICAL at 08:43

## 2024-05-17 RX ADMIN — LEVOFLOXACIN 500 MG: 500 INJECTION, SOLUTION INTRAVENOUS at 14:52

## 2024-05-17 RX ADMIN — GABAPENTIN 300 MG: 300 CAPSULE ORAL at 14:47

## 2024-05-17 RX ADMIN — INSULIN GLARGINE 8 UNITS: 100 INJECTION, SOLUTION SUBCUTANEOUS at 22:22

## 2024-05-17 RX ADMIN — NOREPINEPHRINE BITARTRATE 0.05 MCG/KG/MIN: 0.02 INJECTION, SOLUTION INTRAVENOUS at 20:54

## 2024-05-17 RX ADMIN — ACETAMINOPHEN 975 MG: 325 TABLET ORAL at 08:43

## 2024-05-17 RX ADMIN — OXYCODONE HYDROCHLORIDE 5 MG: 5 TABLET ORAL at 08:49

## 2024-05-17 RX ADMIN — POTASSIUM CHLORIDE 10 MEQ: 7.46 INJECTION, SOLUTION INTRAVENOUS at 20:45

## 2024-05-17 RX ADMIN — HYDROMORPHONE HYDROCHLORIDE 1 MG: 2 TABLET ORAL at 12:16

## 2024-05-17 RX ADMIN — Medication 1 TABLET: at 08:43

## 2024-05-17 RX ADMIN — FERROUS GLUCONATE 324 MG: 324 TABLET ORAL at 08:44

## 2024-05-17 RX ADMIN — ACETAMINOPHEN 975 MG: 325 TABLET ORAL at 20:48

## 2024-05-17 RX ADMIN — IPRATROPIUM BROMIDE AND ALBUTEROL SULFATE 3 ML: .5; 3 SOLUTION RESPIRATORY (INHALATION) at 15:27

## 2024-05-17 RX ADMIN — IPRATROPIUM BROMIDE AND ALBUTEROL SULFATE 3 ML: .5; 3 SOLUTION RESPIRATORY (INHALATION) at 11:29

## 2024-05-17 RX ADMIN — POTASSIUM CHLORIDE 10 MEQ: 7.46 INJECTION, SOLUTION INTRAVENOUS at 22:17

## 2024-05-17 RX ADMIN — SODIUM CHLORIDE, PRESERVATIVE FREE: 5 INJECTION INTRAVENOUS at 06:42

## 2024-05-17 RX ADMIN — SODIUM CHLORIDE 500 ML: 9 INJECTION, SOLUTION INTRAVENOUS at 17:44

## 2024-05-17 RX ADMIN — CEFEPIME 2 G: 2 INJECTION, POWDER, FOR SOLUTION INTRAVENOUS at 17:45

## 2024-05-17 RX ADMIN — URSOSIOL 300 MG: 300 CAPSULE ORAL at 00:06

## 2024-05-17 RX ADMIN — VANCOMYCIN HYDROCHLORIDE 1500 MG: 5 INJECTION, POWDER, LYOPHILIZED, FOR SOLUTION INTRAVENOUS at 18:42

## 2024-05-17 RX ADMIN — SODIUM BICARBONATE 650 MG TABLET 1950 MG: at 20:44

## 2024-05-17 RX ADMIN — INSULIN ASPART 2 UNITS: 100 INJECTION, SOLUTION INTRAVENOUS; SUBCUTANEOUS at 18:09

## 2024-05-17 RX ADMIN — SODIUM BICARBONATE 650 MG TABLET 1950 MG: at 14:47

## 2024-05-17 RX ADMIN — LIDOCAINE HYDROCHLORIDE 0.4 ML: 10 INJECTION, SOLUTION EPIDURAL; INFILTRATION; INTRACAUDAL; PERINEURAL at 20:00

## 2024-05-17 RX ADMIN — LIDOCAINE 1 PATCH: 4 PATCH TOPICAL at 12:40

## 2024-05-17 ASSESSMENT — ACTIVITIES OF DAILY LIVING (ADL)
ADLS_ACUITY_SCORE: 55
ADLS_ACUITY_SCORE: 57
ADLS_ACUITY_SCORE: 57
ADLS_ACUITY_SCORE: 55
ADLS_ACUITY_SCORE: 57
ADLS_ACUITY_SCORE: 57
ADLS_ACUITY_SCORE: 55
ADLS_ACUITY_SCORE: 57
ADLS_ACUITY_SCORE: 55
ADLS_ACUITY_SCORE: 57
ADLS_ACUITY_SCORE: 55
ADLS_ACUITY_SCORE: 55
ADLS_ACUITY_SCORE: 57
ADLS_ACUITY_SCORE: 40
ADLS_ACUITY_SCORE: 40
ADLS_ACUITY_SCORE: 57
ADLS_ACUITY_SCORE: 55
ADLS_ACUITY_SCORE: 57
ADLS_ACUITY_SCORE: 55
ADLS_ACUITY_SCORE: 57
ADLS_ACUITY_SCORE: 53

## 2024-05-17 NOTE — H&P
[facility]    History and Physical - Hospitalist Service       Date of Admission:  5/16/2024    Assessment & Plan      Lance Ibrahim is a 79 year old male admitted on 5/16/2024. 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     On 5/13/24, patient lost balance and fell. Lives by himself, unwitnessed. Did not hit head or LOC, but was not able to get up until Thursday (5/16, day of admit) due to chronic arthritis pains. Son in law stopped over at his house on day of admit, and found patient down. , Cr at baseline. Troponin, EKG unremarkable. CT head, spine, plane films all without acute process. In the ER, he desaturated with activity, CT PE ordered.     Mechanical Fall  Osteoarthritis  Lives home alone, fell 3 days prior to admission, and remained down for several days, unable to get up. Imaging in ER negative for acute process. Likely will need placement.   - PT/OT, appreciate cares, recs  - Tylenol, heat, ice, Voltaren gel  - Oxycodone 2.5 - 5 mg PRN    Acute hypoxia with exertion  On room air on admission, but desaturated to 88% with activity, asymptomatic. Given recent hospitalizations and procedures, will obtain CT PE to rule out PE.   - O2 PRN to maintain sats > 90%  - Follow CT chest  - COVID, flu, RSV  - Add on BNP  - Duonebs    HFpEF  Last echo 3/2020 with EF 50-55%, mild LV global hyperkinesia, pulmonary HTN. Consider component of volume overload, as pt has not taken medications (including torsemide) for past  several days.   - BNP  - PTA torsemide, Imdur    DEAN on CKD  Baseline Cr 1.5-1.6, 1.98 on admission in setting of being down for several days, decreased PO, suspect prerenal.   S/p 1L NS bolus in ER.   - mIVF 100/hr overnight  - Encourage PO   - AM BMP  - Hold PTA lisinopril     HTN  - Hold PTA amlodipine, lisinopril     Biliary obstruction with malignant stricture s/p EUS/ERCP 4/30/24 with sphincterotomy and temporary stent placement   Pruritus  Elevated liver chemistries  LFT's downtrending on admission.   - Follow up outpatient with [facility] as recommended  - PTA Ursodiol 300 mg BID    DM neuropathy  Chronic pain  Has spinal cord stimulator.   - PTA gabapentin 300 mg TID PRN     DM2  A1c 8 two months ago. PTA glipizide, Rybelsus.   - Sliding scale insulin      Incidental pulmonary nodule  Seen on imaging last admission.   - Follow up outpatient as recommended      Gout  - Hold PTA colchicine            Diet: Combination Diet Regular Diet Adult; Moderate Consistent Carb (60 g CHO per Meal) Diet  DVT Prophylaxis: Enoxaparin (Lovenox) SQ  Barnes Catheter: Not present  Fluids: mIVF  Lines: None     Cardiac Monitoring: None  Code Status: Full Code    Clinically Significant Risk Factors Present on Admission              # Hypoalbuminemia: Lowest albumin = 2.9 g/dL at 5/16/2024  4:22 PM, will monitor as appropriate   # Drug Induced Platelet Defect: home medication list includes an antiplatelet medication   # Hypertension: Noted on problem list     # DMII: A1C = 8.0 % (Ref range: 0.0 - 5.6 %) within past 6 months    # Overweight: Estimated body mass index is 28.48 kg/m  as calculated from the following:    Height as of this encounter: 1.829 m (6').    Weight as of this encounter: 95.3 kg (210 lb).              Disposition Plan      Expected Discharge Date: 05/18/2024                The patient's care was discussed with the Attending Physician, Dr. Armas . Dr. Adams will see patient in the AM.       [facility]  DO  Hospitalist Service  [facility]  Securely message with [employer] (more info)  Text page via [employer] Paging/Directory   ______________________________________________________________________    Chief Complaint   Fall on 5/13, weakness, pain    History is obtained from the patient    History of Present Illness   Lance Ibrahim is a 79 year old male admitted on 5/16/2024. 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     On 5/13/24, patient lost balance and fell. Tripped over his sheets while he was making his bed, unsure if he fell forward or backwards. Lives by himself, unwitnessed. Did not hit head or LOC, but was not able to get up until Thursday (5/16, day of admit) due to chronic arthritis pains in his shoulders and hips, denies pain elsewhere. Son in law stopped over at his house on day of admit, and found patient down. Unfortunately, his phone was across the bedroom from where he fell, and he was unable to get to it. He was, however, able to reach about 5 bottles of water that he kept under his bed, and drank those over the next several days.     He notes of chronic pain in bilateral shoulders and hips, and is following with ortho to get multiple joint replacements in the next 18 months.     His main concern on admission was his chronic pain in shoulders and hips. Denies shortness of breath at rest or on exertion, chest pain, lower leg pain or swelling. He has chronic neuropathy, takes gabapentin and has spinal cord  stimulator for this. No cough, congestion, fevers, chills.     Lives at home alone. Omari Soliz is POA. Family dropped him off at ER, but then left to go out of town to a graduation party in [place], [place].       Past Medical History    Past Medical History:   Diagnosis Date    Anemia     Arthritis     osteoarthritis knees    BPH     CAD (coronary artery disease)     subtotal occlusion in the small distal LAD     Cardiomyopathy     Cerebral artery occlusion with cerebral infarction     TIA 1993, no residual    Cervicalgia     CHF (congestive heart failure) (H)     Chronic kidney disease     Chronic low back pain     Chronic rhinitis     Gouty arthropathy     hands    Hyperlipidemia     Hypertension     Kidney stone     Nocturia     Obesity     Osteomyelitis (H) 08/2023    Foot    PVD (peripheral vascular disease)     Sleep apnea     doesn't use cpap    TIA (transient ischaemic attack) 1993    Type 2 diabetes mellitus - 11/08/2018    Ureteral stone        Past Surgical History   Past Surgical History:   Procedure Laterality Date    AMPUTATE FOOT Right 08/07/2023    Procedure: AMPUTATION, right hallux;  Surgeon: Nakul Salazar DPM;  Location: [facility] OR    AMPUTATE TOE(S) Left 10/15/2018    Procedure: AMPUTATE TOE(S);  Left third toe amputation;  Surgeon: Ayaka Azevedo DPM, Podiatry/Foot and Ankle Surgery;  Location:  OR    ARTHROPLASTY KNEE Right 12/07/2018    Procedure: Right total knee arthroplasty;  Surgeon: Issa Cunha MD;  Location:  OR    COLONOSCOPY  03/09/2013    Procedure: COLONOSCOPY;  COLONOSCOPY;  Surgeon: Chau Hogan MD;  Location:  GI    CV HEART CATHETERIZATION WITH POSSIBLE INTERVENTION Left 03/05/2019    Procedure: Coronary Angiogram;  Surgeon: Nas Linda MD;  Location:  HEART CARDIAC CATH LAB    Diastasis recti repair  1985    ENDOSCOPIC RETROGRADE CHOLANGIOPANCREATOGRAM N/A 04/30/2024    Procedure: ENDOSCOPIC RETROGRADE CHOLANGIOPANCREATOGRAPHY, BILIARY  SPHINCTEROTOMY, DILATION, BRUSHING, BIOPSIES with DISTAL EXTRA HEPATIC BILE DUCT STRICTURE;  Surgeon: Aldo Gongora MD;  Location: [facility] OR    ESOPHAGOSCOPY, GASTROSCOPY, DUODENOSCOPY (EGD), COMBINED N/A 04/30/2024    Procedure: ESOPHAGOGASTRODUODENOSCOPY, WITH ENDOSCOPIC ULTRASOUND GUIDANCE;  Surgeon: Aldo Gongora MD;  Location: [facility] OR    EXTRACAPSULAR CATARACT EXTRATION WITH INTRAOCULAR LENS IMPLANT Left 03/13/2017    EXTRACAPSULAR CATARACT EXTRATION WITH INTRAOCULAR LENS IMPLANT Right     FOOT SURGERY  04/2013    cyst removal     HERNIA REPAIR  07/13/2004    ventral     ORIF Shoulder Left     PROSTATE SURGERY  02/2024    Urolift    SPINAL CORD STIMULATOR IMPLANT  04/03/2024    Ventral hernia repair NOS  1987       Prior to Admission Medications   Prior to Admission Medications   Prescriptions Last Dose Informant Patient Reported? Taking?   CONTOUR NEXT TEST test strip   No No   Sig: USE TO TEST BLOOD SUGAR 2 TIMES DAILY OR AS DIRECTED.   Ferrous Gluconate 240 (27 Fe) MG TABS Past Week at 5/13  Yes Yes   Sig: Take 1 tablet by mouth daily    HEMP OIL OR EXTRACT OR OTHER CBD CANNABINOID, NOT MEDICAL CANNABIS, Past Week at 5/13  Yes Yes   Sig: Take 1 chew tab by mouth at bedtime CBD gummy   Lidocaine (LIDOCARE) 4 % Patch More than a month at few weeks  Yes Yes   Sig: Place 1 patch onto the skin daily as needed To prevent lidocaine toxicity, patient should be patch free for 12 hrs daily.   Microlet Lancets MISC   No No   Sig: USE TO TEST BLOOD SUGAR TWICE A DAY OR AS DIRECTED   Probiotic Product (FORTIFY 30 BILLION PROBIOT 50+) CPDR Past Week at 5/13  No Yes   Sig: Take 1 5 Pack by mouth daily   Semaglutide (RYBELSUS) 14 MG tablet Past Week at 5/13  No Yes   Sig: Take 14 mg by mouth daily   Patient taking differently: Take 14 mg by mouth daily Stopping 5/13/24 before surgery   amLODIPine (NORVASC) 5 MG tablet Past Week at 5/13  No Yes   Sig: TAKE 1 TABLET DAILY   aspirin 81 MG EC tablet  Past Week at 5/13  No Yes   Sig: Take 1 tablet (81 mg) by mouth daily   Patient taking differently: Take 81 mg by mouth daily Stopping 5/13/24 before surgery   blood glucose monitoring (NO BRAND SPECIFIED) meter device kit   No No   Sig: Use to test blood sugar 2 times daily or as directed.   calcium carbonate (TUMS) 500 MG chewable tablet Past Week at 5/13  Yes Yes   Sig: Take 1,000 mg by mouth 3 times daily as needed for heartburn   colchicine (COLCYRS) 0.6 MG tablet Past Week at 5/13  No Yes   Sig: TAKE 1 TABLET DAILY   diclofenac (VOLTAREN) 1 % topical gel Past Week at 5/13  Yes Yes   Sig: Apply 4 g topically 4 times daily as needed for moderate pain   emollient (VANICREAM) external cream Past Week at 5/13  No Yes   Sig: Apply topically 3 times daily   famotidine (PEPCID) 20 MG tablet Past Week at 5/13  No Yes   Sig: Take 1 tablet (20 mg) by mouth 2 times daily as needed (itching)   fluticasone (FLONASE) 50 MCG/ACT nasal spray Past Month at PRN  Yes Yes   Sig: Spray 1-2 sprays in nostril daily as needed   gabapentin (NEURONTIN) 300 MG capsule Past Week at 5/13  No Yes   Sig: Take 1 capsule (300 mg) by mouth 3 times daily as needed   glipiZIDE (GLUCOTROL XL) 2.5 MG 24 hr tablet Past Week at 5/13  No Yes   Sig: Take 1 tablet (2.5 mg) by mouth daily   hydrOXYzine HCl (ATARAX) 25 MG tablet Past Week at 5/13  No Yes   Sig: Take 1 tablet (25 mg) by mouth 3 times daily as needed for itching   isosorbide mononitrate (IMDUR) 30 MG 24 hr tablet Past Week at 5/13  No Yes   Sig: TAKE 1 TABLET DAILY   lisinopril (ZESTRIL) 10 MG tablet Past Week at 5/13  No Yes   Sig: Take 1 tablet (10 mg) by mouth every 24 hours   loratadine (CLARITIN) 10 MG tablet Past Week at 5/13  Yes Yes   Sig: Take 10 mg by mouth daily as needed for allergies   melatonin 5 MG CAPS Past Week at 5/13  Yes Yes   Sig: Take 5 mg by mouth at bedtime   multivitamin w/minerals (THERA-VIT-M) tablet Past Week at 5/13  Yes Yes   Sig: Take 1 tablet by mouth daily  "Stopping 24 before surgery   nortriptyline (PAMELOR) 10 MG capsule Past Week at   Yes Yes   Sig: Take 20 mg by mouth nightly as needed   sodium bicarbonate 650 MG tablet Past Week at   No Yes   Sig: Take 3 tablets (1,950 mg) by mouth 3 times daily   torsemide (DEMADEX) 20 MG tablet Past Week at   No Yes   Sig: TAKE 1 TABLET DAILY   ursodiol (ACTIGALL) 300 MG capsule Past Week at   No Yes   Sig: Take 1 capsule (300 mg) by mouth 2 times daily      Facility-Administered Medications: None        Review of Systems    The 10 point Review of Systems is negative other than noted in the HPI or here.     Social History   I have reviewed this patient's social history and updated it with pertinent information if needed.  Social History     Tobacco Use    Smoking status: Former     Current packs/day: 0.00     Types: Cigarettes     Quit date: 3/17/1993     Years since quittin.1    Smokeless tobacco: Never   Vaping Use    Vaping status: Never Used   Substance Use Topics    Alcohol use: Not Currently    Drug use: No     Allergies   Allergies   Allergen Reactions    Ancef [Cefazolin] Rash    Cephalexin Itching and Rash     Allergic reaction required hospitalization 2024    Penicillins Anaphylaxis    Sulfa Antibiotics      \"itchy rash, swelling of face and hands\"        Physical Exam   Vital Signs: Temp: 98  F (36.7  C) Temp src: Oral BP: 125/60 Pulse: 105   Resp: 16 SpO2: 95 % O2 Device: None (Room air)    Weight: 210 lbs 0 oz    Constitutional: awake, alert, cooperative, no apparent distress, and appears stated age  Eyes: Lids and lashes normal, pupils equal, round and reactive to light, extra ocular muscles intact, sclera clear, conjunctiva normal  ENT: Dry mucous membranes, normocephalic, without obvious abnormality, atraumatic  Respiratory: No increased work of breathing, good air exchange, clear to auscultation bilaterally, no crackles or wheezing, on oxymask   Cardiovascular: Tachycardic, regular " rhythm, murmur   GI: normal bowel sounds, soft, non-distended, non-tender, no masses palpated, no hepatosplenomegally  Skin: Warm, dry, no rashes  Musculoskeletal: Mild LE pitting edema, no TTP over lower extremities, sensation intact, right great toe s/p amputation, unable to lift legs off of bed due to pain in bilateral hips, no TTP over bilateral hips; mild TTP over bilateral shoulders   Neurologic: Awake, alert, oriented to name, place and time.  Cranial nerves II-XII are grossly intact.  Motor is limited by pain in lower extremities. Sensory is intact.     Medical Decision Making     Please see A&P for additional details of medical decision making.      Data     I have personally reviewed the following data over the past 24 hrs:    8.6  \   10.7 (L)   / 185     142 104 55.8 (H) /  218 (H)   3.4 21 (L) 1.91 (H) \     ALT: 74 (H) AST: 60 (H) AP: 227 (H) TBILI: 1.7 (H)   ALB: 2.9 (L) TOT PROTEIN: 6.7 LIPASE: N/A     Trop: 34 (H) BNP: 3,157 (H)       Imaging results reviewed over the past 24 hrs:   Recent Results (from the past 24 hour(s))   CT Head w/o Contrast    Narrative    EXAM: CT HEAD W/O CONTRAST  LOCATION: [facility]  DATE: 5/16/2024    INDICATION: fall  COMPARISON: 12/25/2021.  TECHNIQUE: Routine CT Head without IV contrast. Multiplanar reformats. Dose reduction techniques were used.    FINDINGS:  INTRACRANIAL CONTENTS: No intracranial hemorrhage, extraaxial collection, or mass effect.  No CT evidence of acute infarct. Mild presumed chronic small vessel ischemic changes. Mild generalized volume loss. No hydrocephalus.     VISUALIZED ORBITS/SINUSES/MASTOIDS: Prior bilateral cataract surgery. Visualized portions of the orbits are otherwise unremarkable. No paranasal sinus mucosal disease. No middle ear or mastoid effusion.    BONES/SOFT TISSUES: No acute abnormality.      Impression    IMPRESSION:  1.  No CT evidence for acute intracranial process.  2.  Brain atrophy and presumed  chronic microvascular ischemic changes as above.   CT Thoracic Spine w/o Contrast    Narrative    EXAM: CT CERVICAL SPINE W/O CONTRAST, CT LUMBAR SPINE W/O CONTRAST, CT THORACIC SPINE W/O CONTRAST  LOCATION: [facility]  DATE: 5/16/2024    INDICATION: Fall  COMPARISON: 4/26/2024 CT abdomen/pelvis, 12/25/2021 CT cervical spine.  TECHNIQUE:  1) Routine CT Cervical Spine without IV contrast. Multiplanar reformats. Dose reduction techniques were used.   2) Routine CT Thoracic Spine without IV contrast. Multiplanar reformats. Dose reduction techniques were used.   3) Routine CT Lumbar Spine without IV contrast. Multiplanar reformats. Dose reduction techniques were used.     FINDINGS:    CERVICAL SPINE CT:  VERTEBRA: Normal vertebral body heights. Trace grade 1 anterolisthesis of C4 on C5 and C5 on C6. No fracture or posttraumatic subluxation. Multilevel cervical spondylosis, similar compared to prior imaging. Disc space height loss is greatest at C6-C7 and   C7-T1. Multilevel endplate osteophyte formation and facet arthropathy. Osseous ankylosis across the bilateral C2-C3 facet joints.    CANAL/FORAMINA: Multilevel neural foraminal narrowing, severe at C3-C4 bilaterally, C4-C5 on the left, C5-C6 on the right, and C6-C7 on the left. Mild to moderate canal stenosis at C3-C4 due to a disc bulge.    PARASPINAL: No extraspinal abnormality.    THORACIC SPINE CT:  VERTEBRA: Normal vertebral body heights and alignment. No fracture or posttraumatic subluxation. Minimal chronic-appearing irregularity of the superior endplate, possibly reflecting an old fracture. Disc spaces are relatively preserved. Multilevel   diffuse idiopathic skeletal hyperostosis. Mild facet arthropathy throughout the upper thoracic spine.    CANAL/FORAMINA: No high-grade canal or neural foraminal stenosis.    PARASPINAL: No acute extraspinal abnormality. Spinal stimulator leads enter the spinal canal at the level of T12-L1 and  courses superiorly, terminating at the level of T8. Paraspinal muscle atrophy. Incompletely characterized left renal cyst. Aortic   atherosclerotic calcifications. Scattered emphysematous changes and atelectasis.    LUMBAR SPINE CT:  VERTEBRA: Normal vertebral body heights. Trace retrolisthesis of L4 on L5. No fracture or posttraumatic subluxation. Mild/moderate multilevel disc space height loss, greatest at L5-S1. Multilevel endplate osteophyte formation and lower lumbar facet   arthropathy.    CANAL/FORAMINA: No high-grade canal or neural foraminal stenosis. Moderate bilateral L4-L5 and L5-S1 neural foraminal narrowing.    PARASPINAL: No acute extraspinal abnormality. Aortoiliac atherosclerotic calcifications.      Impression    IMPRESSION:  CERVICAL SPINE CT:  1.  No fracture or posttraumatic subluxation.    THORACIC SPINE CT:  1.  No fracture or posttraumatic subluxation.    LUMBAR SPINE CT:  1.  No fracture or posttraumatic subluxation.     CT Lumbar Spine w/o Contrast    Narrative    EXAM: CT CERVICAL SPINE W/O CONTRAST, CT LUMBAR SPINE W/O CONTRAST, CT THORACIC SPINE W/O CONTRAST  LOCATION: [facility]  DATE: 5/16/2024    INDICATION: Fall  COMPARISON: 4/26/2024 CT abdomen/pelvis, 12/25/2021 CT cervical spine.  TECHNIQUE:  1) Routine CT Cervical Spine without IV contrast. Multiplanar reformats. Dose reduction techniques were used.   2) Routine CT Thoracic Spine without IV contrast. Multiplanar reformats. Dose reduction techniques were used.   3) Routine CT Lumbar Spine without IV contrast. Multiplanar reformats. Dose reduction techniques were used.     FINDINGS:    CERVICAL SPINE CT:  VERTEBRA: Normal vertebral body heights. Trace grade 1 anterolisthesis of C4 on C5 and C5 on C6. No fracture or posttraumatic subluxation. Multilevel cervical spondylosis, similar compared to prior imaging. Disc space height loss is greatest at C6-C7 and   C7-T1. Multilevel endplate osteophyte formation  and facet arthropathy. Osseous ankylosis across the bilateral C2-C3 facet joints.    CANAL/FORAMINA: Multilevel neural foraminal narrowing, severe at C3-C4 bilaterally, C4-C5 on the left, C5-C6 on the right, and C6-C7 on the left. Mild to moderate canal stenosis at C3-C4 due to a disc bulge.    PARASPINAL: No extraspinal abnormality.    THORACIC SPINE CT:  VERTEBRA: Normal vertebral body heights and alignment. No fracture or posttraumatic subluxation. Minimal chronic-appearing irregularity of the superior endplate, possibly reflecting an old fracture. Disc spaces are relatively preserved. Multilevel   diffuse idiopathic skeletal hyperostosis. Mild facet arthropathy throughout the upper thoracic spine.    CANAL/FORAMINA: No high-grade canal or neural foraminal stenosis.    PARASPINAL: No acute extraspinal abnormality. Spinal stimulator leads enter the spinal canal at the level of T12-L1 and courses superiorly, terminating at the level of T8. Paraspinal muscle atrophy. Incompletely characterized left renal cyst. Aortic   atherosclerotic calcifications. Scattered emphysematous changes and atelectasis.    LUMBAR SPINE CT:  VERTEBRA: Normal vertebral body heights. Trace retrolisthesis of L4 on L5. No fracture or posttraumatic subluxation. Mild/moderate multilevel disc space height loss, greatest at L5-S1. Multilevel endplate osteophyte formation and lower lumbar facet   arthropathy.    CANAL/FORAMINA: No high-grade canal or neural foraminal stenosis. Moderate bilateral L4-L5 and L5-S1 neural foraminal narrowing.    PARASPINAL: No acute extraspinal abnormality. Aortoiliac atherosclerotic calcifications.      Impression    IMPRESSION:  CERVICAL SPINE CT:  1.  No fracture or posttraumatic subluxation.    THORACIC SPINE CT:  1.  No fracture or posttraumatic subluxation.    LUMBAR SPINE CT:  1.  No fracture or posttraumatic subluxation.     CT Cervical Spine w/o Contrast    Narrative    EXAM: CT CERVICAL SPINE W/O CONTRAST,  CT LUMBAR SPINE W/O CONTRAST, CT THORACIC SPINE W/O CONTRAST  LOCATION: [facility]  DATE: 5/16/2024    INDICATION: Fall  COMPARISON: 4/26/2024 CT abdomen/pelvis, 12/25/2021 CT cervical spine.  TECHNIQUE:  1) Routine CT Cervical Spine without IV contrast. Multiplanar reformats. Dose reduction techniques were used.   2) Routine CT Thoracic Spine without IV contrast. Multiplanar reformats. Dose reduction techniques were used.   3) Routine CT Lumbar Spine without IV contrast. Multiplanar reformats. Dose reduction techniques were used.     FINDINGS:    CERVICAL SPINE CT:  VERTEBRA: Normal vertebral body heights. Trace grade 1 anterolisthesis of C4 on C5 and C5 on C6. No fracture or posttraumatic subluxation. Multilevel cervical spondylosis, similar compared to prior imaging. Disc space height loss is greatest at C6-C7 and   C7-T1. Multilevel endplate osteophyte formation and facet arthropathy. Osseous ankylosis across the bilateral C2-C3 facet joints.    CANAL/FORAMINA: Multilevel neural foraminal narrowing, severe at C3-C4 bilaterally, C4-C5 on the left, C5-C6 on the right, and C6-C7 on the left. Mild to moderate canal stenosis at C3-C4 due to a disc bulge.    PARASPINAL: No extraspinal abnormality.    THORACIC SPINE CT:  VERTEBRA: Normal vertebral body heights and alignment. No fracture or posttraumatic subluxation. Minimal chronic-appearing irregularity of the superior endplate, possibly reflecting an old fracture. Disc spaces are relatively preserved. Multilevel   diffuse idiopathic skeletal hyperostosis. Mild facet arthropathy throughout the upper thoracic spine.    CANAL/FORAMINA: No high-grade canal or neural foraminal stenosis.    PARASPINAL: No acute extraspinal abnormality. Spinal stimulator leads enter the spinal canal at the level of T12-L1 and courses superiorly, terminating at the level of T8. Paraspinal muscle atrophy. Incompletely characterized left renal cyst. Aortic    atherosclerotic calcifications. Scattered emphysematous changes and atelectasis.    LUMBAR SPINE CT:  VERTEBRA: Normal vertebral body heights. Trace retrolisthesis of L4 on L5. No fracture or posttraumatic subluxation. Mild/moderate multilevel disc space height loss, greatest at L5-S1. Multilevel endplate osteophyte formation and lower lumbar facet   arthropathy.    CANAL/FORAMINA: No high-grade canal or neural foraminal stenosis. Moderate bilateral L4-L5 and L5-S1 neural foraminal narrowing.    PARASPINAL: No acute extraspinal abnormality. Aortoiliac atherosclerotic calcifications.      Impression    IMPRESSION:  CERVICAL SPINE CT:  1.  No fracture or posttraumatic subluxation.    THORACIC SPINE CT:  1.  No fracture or posttraumatic subluxation.    LUMBAR SPINE CT:  1.  No fracture or posttraumatic subluxation.     XR Shoulder Left 2 Views    Narrative    EXAM: XR SHOULDER LEFT 2 VIEWS  LOCATION: [facility]  DATE: 5/16/2024    INDICATION: Pain after fall.  COMPARISON: None.      Impression    IMPRESSION: Postoperative changes to the humeral head with screw fixation. Severe superimposed degenerative change at the glenohumeral joint but no evidence for acute-appearing fracture or dislocation. Chronic-appearing irregularity of the greater   tuberosity likely has a posttraumatic origin but appears chronic. Mild hypertrophic change at the AC joint. The clavicle is negative.   XR Shoulder Right 2 Views    Narrative    EXAM: XR SHOULDER RIGHT 2 VIEWS  LOCATION: [facility]  DATE: 5/16/2024    INDICATION: Pain after fall  COMPARISON: None.      Impression    IMPRESSION: The right glenohumeral and acromioclavicular joints are negative for fracture or dislocation. There is degenerative change of both joints. The right clavicle is negative for fracture.   XR Hip Bilateral 1 View Each    Narrative    EXAM: XR PELVIS W 2 VW HIPS BILATERAL  LOCATION: [facility]  [facility]  DATE: 5/16/2024    INDICATION: Pain after fall  COMPARISON: None.      Impression    IMPRESSION: Degenerative change both hip joints. No evidence for fracture or dislocation. Pelvis negative for fracture.   CT Chest Pulmonary Embolism w Contrast    Narrative    EXAM: CT CHEST PULMONARY EMBOLISM W CONTRAST  LOCATION: [facility]  DATE: 5/16/2024    INDICATION: Hypoxia.  COMPARISON: None.  TECHNIQUE: CT chest pulmonary angiogram during arterial phase injection of IV contrast. Multiplanar reformats and MIP reconstructions were performed. Dose reduction techniques were used.   CONTRAST: 75 ML ISOVUE 370.    FINDINGS:  ANGIOGRAM CHEST: Pulmonary arteries are normal caliber and negative for pulmonary emboli. Thoracic aorta is negative for dissection. No CT evidence of right heart strain.    LUNGS AND PLEURA: Mild emphysematous changes in the lungs. Subpleural atelectasis in the lower lungs posteriorly greater on the left. Nothing definite for acute pneumonitis. Subpleural biapical scarring. No pleural effusions.    MEDIASTINUM/AXILLAE: Normal.    CORONARY ARTERY CALCIFICATION: Moderate to severe.    UPPER ABDOMEN: Pneumobilia compatible with prior biliary intervention. Benign left renal cyst. Spleen is enlarged measuring up to 15 cm.    MUSCULOSKELETAL: Severe degenerative arthritis left shoulder. Screw fixation left humeral head. Moderate-to-marked hypertrophic and degenerative changes in the spine. Intrathecal catheter device in place.      Impression    IMPRESSION:  1.  Negative for pulmonary embolism.    2.  Emphysematous changes in the lungs.    3.  Subpleural scarring and atelectasis in the lungs. No evidence for pneumonitis.    4.  Prominent coronary artery calcification.    5.  Splenic enlargement.    6.  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

## 2024-05-17 NOTE — PROGRESS NOTES
05/17/24 1000   Appointment Info   Signing Clinician's Name / Credentials (PT) RAE Amador   Student Supervision Direct supervision provided;Therapy services provided with the co-signing licensed therapist guiding and directing the services, and providing the skilled judgement and assessment throughout the session   Living Environment   People in Home alone   Current Living Arrangements apartment   Home Accessibility no concerns   Number of Stairs, Within Home, Primary none   Transportation Anticipated family or friend will provide   Self-Care   Usual Activity Tolerance fair   Current Activity Tolerance poor   Equipment Currently Used at Home other (see comments)  (4WW)   Fall history within last six months yes   Activity/Exercise/Self-Care Comment has diabetic neuropathy   General Information   Onset of Illness/Injury or Date of Surgery 05/16/24   Referring Physician Tracy Adams MD   Patient/Family Therapy Goals Statement (PT) return to home   Pertinent History of Current Problem (include personal factors and/or comorbidities that impact the POC) fall, initial encounter   Weight-Bearing Status - LLE full weight-bearing  (informally: WBAT d/t pain)   Weight-Bearing Status - RLE full weight-bearing   Range of Motion (ROM)   Range of Motion ROM deficits secondary to pain   Strength (Manual Muscle Testing)   Strength (Manual Muscle Testing) Deficits observed during functional mobility   Bed Mobility   Bed Mobility rolling left;rolling right;scooting/bridging   Rolling Left [place] (Bed Mobility) 1 person assist;maximum assist (25% patient effort)  (very painful d/t L hip pain s/p fall; reports pain in middle of low back)   Rolling Right [place] (Bed Mobility) 1 person assist;maximum assist (25% patient effort)  (reports pain in middle of low back)   Scooting/Bridging [place] (Bed Mobility) moderate assist (50% patient effort);verbal cues;nonverbal cues (demo/gesture);1 person assist;other  (see comments)  (able to bend knees but unable to load LEs to lift hips)   Supine-Sit [place] (Bed Mobility) unable to assess  (pain)   Sit-Supine [place] (Bed Mobility) unable to assess  (pain)   Transfers   Comment, (Transfers) unable to assess d/t pain   Gait/Stairs (Locomotion)   Comment, (Gait/Stairs) unable to assess d/t pain   Clinical Impression   Criteria for Skilled Therapeutic Intervention Yes, treatment indicated   PT Diagnosis (PT) impaired functional mobility   Influenced by the following impairments pain, weakness   Functional limitations due to impairments transfers, gait, therex   Clinical Presentation (PT Evaluation Complexity) evolving   Clinical Presentation Rationale pt presents as medically diagnosed   Clinical Decision Making (Complexity) moderate complexity   Planned Therapy Interventions (PT) gait training;home exercise program;patient/family education;stair training;strengthening;transfer training;wheelchair management/propulsion training   Risk & Benefits of therapy have been explained spouse/significant other;son  (in-laws)   PT Total Evaluation Time   PT Eval, Moderate Complexity Minutes (25615) 10   Physical Therapy Goals   PT Frequency Daily   PT Predicted Duration/Target Date for Goal Attainment 05/31/24   PT Goals Transfers;Gait;PT Goal 1   PT: Transfers Minimal assist;Bed to/from chair;Assistive device   PT: Gait Minimal assist;Rolling walker;10 feet   PT: Goal 1 ind with HEP   Interventions   Interventions Quick Adds Gait Training;Therapeutic Activity;Therapeutic Procedure   Therapeutic Activity   Therapeutic Activities: dynamic activities to improve functional performance Minutes (91944) 15   Symptoms Noted During/After Treatment Increased pain   Treatment Detail/Skilled Intervention scooting, pt able to assist in knee flexion with minAx1 but unable to load BLEs to lift hips or scoot, scooting to HOB achieved with maxAx2; rolling L/R x2,  maxAx1, vc/tc for hand placement  to reach for rail and assist with roll, vc/physical assist to flex knee and use LE to assist in roll; mobility limited due to pain   PT Discharge Planning   PT Plan transfers, gait with FWW, therex   PT Discharge Recommendation (DC Rec) (S)  Transitional Care Facility   PT Rationale for DC Rec pt admitted to hospital s/p fall, after which he was unable to get off the floor for 4 days until his son performed a welfare check on him- he was unable to crawl to other side of the room to get his phone, per pt report   PT Brief overview of current status minAx1-maxAx2 for bed mobility   PT Equipment Needed at Discharge walker, rolling;walker, standard   Total Session Time   Timed Code Treatment Minutes 15   Total Session Time (sum of timed and untimed services) 25

## 2024-05-17 NOTE — PROGRESS NOTES
"BRIEF PROGRESS NOTE    Assessment/Plan:  Lance Ibrahim is a 79 year old male admitted on 5/16/2024. 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 He is admitted for generalized weakness after mechanical fall as well as O2 desaturation with activity.    Severe sepsis with hypotension, tachycardia, hypoxia, and DEAN  Known infection of Klebsiella UTI  Lactic acid 1.3. MAP of 51. Additional NS 1L bolus given and patient placed in supine [place] position with improved heart rate. BP remained low and was started on norepinephrine. BNP decreased to 2000 from admission. No signs of bleeding. Has received Levaquin 500mg IV. EKG with improved tachycardia and no other changes. Chest x-ray without increased fluids. Patient able to come off of 3L oxygen after fluid resuscitation. Likely severe sepsis with known infection.   Dr. Atif Maldonado discussed events with Omari Ireland on the telephone.  Plan to admit to ICU. Discussed care with intensivist, Dr. Hallman.  IV fluids stopped. Patient likely could tolerate more fluids if needed with bedside ultrasound imagining, chest x-ray, and repeat BNP.  Continue norepinephrine  Follow-up troponin. Likely due to stress. EKG similar to previous except for resolved tachycardia.  Switch to vancomycin and cefepime. Stop Levaquin.  PICC placement  Heparin for anticoagulation due to kidney function      Subjective:  Called by nursing for continued low blood pressures with MAP <60. 1LNS bolus running. Levaquin being given. Patient conversive.     Patient states he \"feels like shit.\" He denies lightheadedness, chest pain, nausea, " or shortness of breath. He states his only pain is in his coccyx and worse if he lays down completely flat. He denies any hematuria, hematochezia, or recent surgeries.    Initial Assessment:  Temp:  [97.4  F (36.3  C)-99.9  F (37.7  C)] 99.9  F (37.7  C)  Pulse:  [] 92  Resp:  [12-35] 29  BP: ()/(39-72) 107/49  SpO2:  [90 %-97 %] 95 %    General: In no apparent distress, laying in bed, cool washcloth on forehead, cool water bags on arms  Ears: hearing grossly intact  Head: Atraumatic normocephalic.  Eyes: Conjunctiva normal, extraocular movements normal.  Nose: No nasal drainage.  Cardiovascular: Heart rate and rhythm normal. Normal S1/S2.   Respiratory: Oxymask in place. Lung sounds present bilaterally with crackles at left base  Extremities: No tenderness.  Skin: Yellowing of skin, warm, mildly diaphoretic, multiple healed scars present: left shoulder, abdomen, right foot. Right hallux amputation. 2 lidocaine patches present on bilateral hips  Pulses: +2 radial and DP  Phys: Oriented x3, falling asleep shortly after talking but wakes up to voice, easily follows commands    Labs:  BNP:2058  Troponin: 51  Creatine: 1.97  Hgb: 8.4  Blood cultures: pending    Imaging:  EKG: tachycardia resolved compared to previous EKG. No acute ST segment changes.  Chest x-ray: no signs of increased fluids on lungs  Bed side ultrasound completed with ER physician, Dr. Jeffery Nassar: Cardiac image showing no right heart strain, no tamponade, patient likely able to tolerate more fluids      Plan discussed with attending provider, Dr. Tracy Adams, who agrees with the plan.    Alma Mcdaniel MD PGY3 5/17/2024  [facility]

## 2024-05-17 NOTE — PROGRESS NOTES
[facility]    Progress Note - Hospitalist Service       Date of Admission:  5/16/2024    Assessment & Plan   Lance Ibrahim is a 79 year old male admitted on 5/16/2024. 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      Mechanical Fall  Osteoarthritis  Chronic pain  Lives home alone, unwitnessed mechanical fall 3 days prior to admission, remained down for several days, unable to get up before son in law found him. Imaging in ER negative for acute process. Likely will need placement.  Patient has spinal stimulator, which she states he usually charges once a week.  Due to recent fall he has been unable to charge this.  - PT/OT, appreciate cares, recs  - Tylenol, heat, ice, Voltaren gel  - Oxycodone 2.5 - 5 mg PRN  - Oral Dilaudid 2 mg for severe pain PRN  - Lidocaine patch  - Will be reaching out to family to see if someone can bring in  for spinal stimulator     Acute hypoxia with exertion  On room air on admission, but desaturated to 88% with activity, asymptomatic. CT PE was negative.  COVID, flu, RSV was negative.  - O2 PRN to maintain sats > 90%  - Duonebs     HFpEF  Last echo 3/2020 with EF 50-55%, mild LV global hyperkinesia, pulmonary HTN.  Patient does not appear fluid overloaded on exam.  BNP was elevated at 3157.  - PTA torsemide, Imdur     DEAN on CKD  Baseline Cr 1.5-1.6, 1.98 on admission in setting of being down for several days, decreased PO, suspect prerenal.  Creatinine 1.95 this morning.  - Encourage PO    - AM BMP  - Hold PTA lisinopril      HTN  - Hold PTA amlodipine, lisinopril      Biliary obstruction with malignant stricture s/p EUS/ERCP 4/30/24 with sphincterotomy and temporary stent placement   Pruritus  Elevated liver chemistries  LFT's downtrending on admission.   - Follow up outpatient with [facility] as recommended  - PTA Ursodiol 300 mg BID     DM neuropathy  Chronic pain  Has spinal cord stimulator.   - PTA gabapentin 300 mg TID PRN     DM2  A1c 8 two months ago. PTA glipizide, Rybelsus.   - Sliding scale insulin      Incidental pulmonary nodule  Seen on imaging last admission.   - Follow up outpatient as recommended      Gout  - Hold PTA colchicine        Diet: Combination Diet Regular Diet Adult; Moderate Consistent Carb (60 g CHO per Meal) Diet    DVT Prophylaxis: Enoxaparin (Lovenox) SQ  Barnes Catheter: Not present  Fluids: po  Lines: None     Cardiac Monitoring: None  Code Status: Full Code      Clinically Significant Risk Factors Present on Admission              # Hypoalbuminemia: Lowest albumin = 2.7 g/dL at 5/17/2024  6:02 AM, will monitor as appropriate   # Drug Induced Platelet Defect: home medication list includes an antiplatelet medication   # Hypertension: Noted on problem list     # DMII: A1C = 8.0 % (Ref range: 0.0 - 5.6 %) within past 6 months    # Overweight: Estimated body mass index is 28.48 kg/m  as calculated from the following:    Height as of this encounter: 1.829 m (6').    Weight as of this encounter: 95.3 kg (210 lb).              Disposition Plan      Expected Discharge Date: 05/18/2024                The patient's care was discussed with the Attending Physician, Dr. Adams .    Thais Suarez MD PGY1  Hospitalist Service  [facility]  Securely message with Identify (more info)  Text page via [facility] Paging/Directory   ______________________________________________________________________    Interval History   Was admitted overnight.  No acute events.   Patient was complaining of back pain this morning that was o not resolved with Tylenol, oxycodone, Voltaren gel.  Patient states he has a spinal stimulator that he usually charges around once a week but due to recent fall and being down on the ground he has been unable to charge this.  He states he has a  at home and maybe a family member could bring it in for him.  Other than pain in his back he denies any concerns such as headache, blurry vision, chest pain.    Physical Exam   Vital Signs: Temp: 97.6  F (36.4  C) Temp src: Oral BP: 134/63 Pulse: 86   Resp: 20 SpO2: 97 % O2 Device: Oxymask Oxygen Delivery: 3 LPM  Weight: 210 lbs 0 oz    GENERAL: alert and no acute distress  HENT: hearing grossly intact  RESP: lungs clear to auscultation - no rales, rhonchi or wheezes  CV: Tachycardic and regular rhythm, normal S1 S2, no murmur appreciated  ABDOMEN: soft, nontender, and bowel sounds normal  PSYCH: mentation appears normal, affect normal/bright  EXTREMITIES: Mild lower extremity pitting edema      Data     I have personally reviewed the following data over the past 24 hrs:    8.8  \   9.9 (L)   / 168     141 106 54.9 (H) /  206 (H)   3.4 20 (L) 1.95 (H) \     ALT: 53 AST: 48 (H) AP: 202 (H) TBILI: 1.3 (H)   ALB: 2.7 (L) TOT PROTEIN: 6.2 (L) LIPASE: N/A     Trop: 34 (H) BNP: 3,157 (H)       Imaging results reviewed over the past 24 hrs:   Recent Results (from the past 24 hour(s))   CT Head w/o Contrast    Narrative    EXAM: CT HEAD W/O CONTRAST  LOCATION: [facility]  DATE: 5/16/2024    INDICATION: fall  COMPARISON: 12/25/2021.  TECHNIQUE: Routine CT Head without IV contrast. Multiplanar reformats. Dose reduction techniques were used.    FINDINGS:  INTRACRANIAL CONTENTS: No intracranial hemorrhage, extraaxial collection, or mass effect.  No CT evidence of acute infarct. Mild presumed chronic small vessel ischemic changes. Mild generalized volume loss. No hydrocephalus.     VISUALIZED  ORBITS/SINUSES/MASTOIDS: Prior bilateral cataract surgery. Visualized portions of the orbits are otherwise unremarkable. No paranasal sinus mucosal disease. No middle ear or mastoid effusion.    BONES/SOFT TISSUES: No acute abnormality.      Impression    IMPRESSION:  1.  No CT evidence for acute intracranial process.  2.  Brain atrophy and presumed chronic microvascular ischemic changes as above.   CT Thoracic Spine w/o Contrast    Narrative    EXAM: CT CERVICAL SPINE W/O CONTRAST, CT LUMBAR SPINE W/O CONTRAST, CT THORACIC SPINE W/O CONTRAST  LOCATION: [facility]  DATE: 5/16/2024    INDICATION: Fall  COMPARISON: 4/26/2024 CT abdomen/pelvis, 12/25/2021 CT cervical spine.  TECHNIQUE:  1) Routine CT Cervical Spine without IV contrast. Multiplanar reformats. Dose reduction techniques were used.   2) Routine CT Thoracic Spine without IV contrast. Multiplanar reformats. Dose reduction techniques were used.   3) Routine CT Lumbar Spine without IV contrast. Multiplanar reformats. Dose reduction techniques were used.     FINDINGS:    CERVICAL SPINE CT:  VERTEBRA: Normal vertebral body heights. Trace grade 1 anterolisthesis of C4 on C5 and C5 on C6. No fracture or posttraumatic subluxation. Multilevel cervical spondylosis, similar compared to prior imaging. Disc space height loss is greatest at C6-C7 and   C7-T1. Multilevel endplate osteophyte formation and facet arthropathy. Osseous ankylosis across the bilateral C2-C3 facet joints.    CANAL/FORAMINA: Multilevel neural foraminal narrowing, severe at C3-C4 bilaterally, C4-C5 on the left, C5-C6 on the right, and C6-C7 on the left. Mild to moderate canal stenosis at C3-C4 due to a disc bulge.    PARASPINAL: No extraspinal abnormality.    THORACIC SPINE CT:  VERTEBRA: Normal vertebral body heights and alignment. No fracture or posttraumatic subluxation. Minimal chronic-appearing irregularity of the superior endplate, possibly reflecting an old  fracture. Disc spaces are relatively preserved. Multilevel   diffuse idiopathic skeletal hyperostosis. Mild facet arthropathy throughout the upper thoracic spine.    CANAL/FORAMINA: No high-grade canal or neural foraminal stenosis.    PARASPINAL: No acute extraspinal abnormality. Spinal stimulator leads enter the spinal canal at the level of T12-L1 and courses superiorly, terminating at the level of T8. Paraspinal muscle atrophy. Incompletely characterized left renal cyst. Aortic   atherosclerotic calcifications. Scattered emphysematous changes and atelectasis.    LUMBAR SPINE CT:  VERTEBRA: Normal vertebral body heights. Trace retrolisthesis of L4 on L5. No fracture or posttraumatic subluxation. Mild/moderate multilevel disc space height loss, greatest at L5-S1. Multilevel endplate osteophyte formation and lower lumbar facet   arthropathy.    CANAL/FORAMINA: No high-grade canal or neural foraminal stenosis. Moderate bilateral L4-L5 and L5-S1 neural foraminal narrowing.    PARASPINAL: No acute extraspinal abnormality. Aortoiliac atherosclerotic calcifications.      Impression    IMPRESSION:  CERVICAL SPINE CT:  1.  No fracture or posttraumatic subluxation.    THORACIC SPINE CT:  1.  No fracture or posttraumatic subluxation.    LUMBAR SPINE CT:  1.  No fracture or posttraumatic subluxation.     CT Lumbar Spine w/o Contrast    Narrative    EXAM: CT CERVICAL SPINE W/O CONTRAST, CT LUMBAR SPINE W/O CONTRAST, CT THORACIC SPINE W/O CONTRAST  LOCATION: [facility]  DATE: 5/16/2024    INDICATION: Fall  COMPARISON: 4/26/2024 CT abdomen/pelvis, 12/25/2021 CT cervical spine.  TECHNIQUE:  1) Routine CT Cervical Spine without IV contrast. Multiplanar reformats. Dose reduction techniques were used.   2) Routine CT Thoracic Spine without IV contrast. Multiplanar reformats. Dose reduction techniques were used.   3) Routine CT Lumbar Spine without IV contrast. Multiplanar reformats. Dose reduction techniques  were used.     FINDINGS:    CERVICAL SPINE CT:  VERTEBRA: Normal vertebral body heights. Trace grade 1 anterolisthesis of C4 on C5 and C5 on C6. No fracture or posttraumatic subluxation. Multilevel cervical spondylosis, similar compared to prior imaging. Disc space height loss is greatest at C6-C7 and   C7-T1. Multilevel endplate osteophyte formation and facet arthropathy. Osseous ankylosis across the bilateral C2-C3 facet joints.    CANAL/FORAMINA: Multilevel neural foraminal narrowing, severe at C3-C4 bilaterally, C4-C5 on the left, C5-C6 on the right, and C6-C7 on the left. Mild to moderate canal stenosis at C3-C4 due to a disc bulge.    PARASPINAL: No extraspinal abnormality.    THORACIC SPINE CT:  VERTEBRA: Normal vertebral body heights and alignment. No fracture or posttraumatic subluxation. Minimal chronic-appearing irregularity of the superior endplate, possibly reflecting an old fracture. Disc spaces are relatively preserved. Multilevel   diffuse idiopathic skeletal hyperostosis. Mild facet arthropathy throughout the upper thoracic spine.    CANAL/FORAMINA: No high-grade canal or neural foraminal stenosis.    PARASPINAL: No acute extraspinal abnormality. Spinal stimulator leads enter the spinal canal at the level of T12-L1 and courses superiorly, terminating at the level of T8. Paraspinal muscle atrophy. Incompletely characterized left renal cyst. Aortic   atherosclerotic calcifications. Scattered emphysematous changes and atelectasis.    LUMBAR SPINE CT:  VERTEBRA: Normal vertebral body heights. Trace retrolisthesis of L4 on L5. No fracture or posttraumatic subluxation. Mild/moderate multilevel disc space height loss, greatest at L5-S1. Multilevel endplate osteophyte formation and lower lumbar facet   arthropathy.    CANAL/FORAMINA: No high-grade canal or neural foraminal stenosis. Moderate bilateral L4-L5 and L5-S1 neural foraminal narrowing.    PARASPINAL: No acute extraspinal abnormality. Aortoiliac  atherosclerotic calcifications.      Impression    IMPRESSION:  CERVICAL SPINE CT:  1.  No fracture or posttraumatic subluxation.    THORACIC SPINE CT:  1.  No fracture or posttraumatic subluxation.    LUMBAR SPINE CT:  1.  No fracture or posttraumatic subluxation.     CT Cervical Spine w/o Contrast    Narrative    EXAM: CT CERVICAL SPINE W/O CONTRAST, CT LUMBAR SPINE W/O CONTRAST, CT THORACIC SPINE W/O CONTRAST  LOCATION: [facility]  DATE: 5/16/2024    INDICATION: Fall  COMPARISON: 4/26/2024 CT abdomen/pelvis, 12/25/2021 CT cervical spine.  TECHNIQUE:  1) Routine CT Cervical Spine without IV contrast. Multiplanar reformats. Dose reduction techniques were used.   2) Routine CT Thoracic Spine without IV contrast. Multiplanar reformats. Dose reduction techniques were used.   3) Routine CT Lumbar Spine without IV contrast. Multiplanar reformats. Dose reduction techniques were used.     FINDINGS:    CERVICAL SPINE CT:  VERTEBRA: Normal vertebral body heights. Trace grade 1 anterolisthesis of C4 on C5 and C5 on C6. No fracture or posttraumatic subluxation. Multilevel cervical spondylosis, similar compared to prior imaging. Disc space height loss is greatest at C6-C7 and   C7-T1. Multilevel endplate osteophyte formation and facet arthropathy. Osseous ankylosis across the bilateral C2-C3 facet joints.    CANAL/FORAMINA: Multilevel neural foraminal narrowing, severe at C3-C4 bilaterally, C4-C5 on the left, C5-C6 on the right, and C6-C7 on the left. Mild to moderate canal stenosis at C3-C4 due to a disc bulge.    PARASPINAL: No extraspinal abnormality.    THORACIC SPINE CT:  VERTEBRA: Normal vertebral body heights and alignment. No fracture or posttraumatic subluxation. Minimal chronic-appearing irregularity of the superior endplate, possibly reflecting an old fracture. Disc spaces are relatively preserved. Multilevel   diffuse idiopathic skeletal hyperostosis. Mild facet arthropathy throughout the  upper thoracic spine.    CANAL/FORAMINA: No high-grade canal or neural foraminal stenosis.    PARASPINAL: No acute extraspinal abnormality. Spinal stimulator leads enter the spinal canal at the level of T12-L1 and courses superiorly, terminating at the level of T8. Paraspinal muscle atrophy. Incompletely characterized left renal cyst. Aortic   atherosclerotic calcifications. Scattered emphysematous changes and atelectasis.    LUMBAR SPINE CT:  VERTEBRA: Normal vertebral body heights. Trace retrolisthesis of L4 on L5. No fracture or posttraumatic subluxation. Mild/moderate multilevel disc space height loss, greatest at L5-S1. Multilevel endplate osteophyte formation and lower lumbar facet   arthropathy.    CANAL/FORAMINA: No high-grade canal or neural foraminal stenosis. Moderate bilateral L4-L5 and L5-S1 neural foraminal narrowing.    PARASPINAL: No acute extraspinal abnormality. Aortoiliac atherosclerotic calcifications.      Impression    IMPRESSION:  CERVICAL SPINE CT:  1.  No fracture or posttraumatic subluxation.    THORACIC SPINE CT:  1.  No fracture or posttraumatic subluxation.    LUMBAR SPINE CT:  1.  No fracture or posttraumatic subluxation.     XR Shoulder Left 2 Views    Narrative    EXAM: XR SHOULDER LEFT 2 VIEWS  LOCATION: [facility]  DATE: 5/16/2024    INDICATION: Pain after fall.  COMPARISON: None.      Impression    IMPRESSION: Postoperative changes to the humeral head with screw fixation. Severe superimposed degenerative change at the glenohumeral joint but no evidence for acute-appearing fracture or dislocation. Chronic-appearing irregularity of the greater   tuberosity likely has a posttraumatic origin but appears chronic. Mild hypertrophic change at the AC joint. The clavicle is negative.   XR Shoulder Right 2 Views    Narrative    EXAM: XR SHOULDER RIGHT 2 VIEWS  LOCATION: [facility]  DATE: 5/16/2024    INDICATION: Pain after fall  COMPARISON:  None.      Impression    IMPRESSION: The right glenohumeral and acromioclavicular joints are negative for fracture or dislocation. There is degenerative change of both joints. The right clavicle is negative for fracture.   XR Hip Bilateral 1 View Each    Narrative    EXAM: XR PELVIS W 2 VW HIPS BILATERAL  LOCATION: [facility]  DATE: 5/16/2024    INDICATION: Pain after fall  COMPARISON: None.      Impression    IMPRESSION: Degenerative change both hip joints. No evidence for fracture or dislocation. Pelvis negative for fracture.   CT Chest Pulmonary Embolism w Contrast    Narrative    EXAM: CT CHEST PULMONARY EMBOLISM W CONTRAST  LOCATION: [facility]  DATE: 5/16/2024    INDICATION: Hypoxia.  COMPARISON: None.  TECHNIQUE: CT chest pulmonary angiogram during arterial phase injection of IV contrast. Multiplanar reformats and MIP reconstructions were performed. Dose reduction techniques were used.   CONTRAST: 75 ML ISOVUE 370.    FINDINGS:  ANGIOGRAM CHEST: Pulmonary arteries are normal caliber and negative for pulmonary emboli. Thoracic aorta is negative for dissection. No CT evidence of right heart strain.    LUNGS AND PLEURA: Mild emphysematous changes in the lungs. Subpleural atelectasis in the lower lungs posteriorly greater on the left. Nothing definite for acute pneumonitis. Subpleural biapical scarring. No pleural effusions.    MEDIASTINUM/AXILLAE: Normal.    CORONARY ARTERY CALCIFICATION: Moderate to severe.    UPPER ABDOMEN: Pneumobilia compatible with prior biliary intervention. Benign left renal cyst. Spleen is enlarged measuring up to 15 cm.    MUSCULOSKELETAL: Severe degenerative arthritis left shoulder. Screw fixation left humeral head. Moderate-to-marked hypertrophic and degenerative changes in the spine. Intrathecal catheter device in place.      Impression    IMPRESSION:  1.  Negative for pulmonary embolism.    2.  Emphysematous changes in the  lungs.    3.  Subpleural scarring and atelectasis in the lungs. No evidence for pneumonitis.    4.  Prominent coronary artery calcification.    5.  Splenic enlargement.    6.  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

## 2024-05-17 NOTE — PROGRESS NOTES
05/17/24 1129   Vital Signs   Resp 22   Pulse 91   Pulse Rate Source Monitor   Oxygen Therapy   SpO2 97 %   O2 Device Nasal cannula   Oxygen Delivery 3 LPM   Breath Sounds   Breath Sounds All Fields   All Lung Fields Breath Sounds diminished;clear   Nebulizer Assessment & Treatment   $RT Use ONLY Delivery Method Nebulizer - Additional   Nebulizer Device Mask   Pretreatment Heart Rate (beats/min) 98   Pretreatment Resp Rate (breaths/min) 20   Pretreatment O2 sats - (TCU only) 94   Pretreat Breath Sounds - Bilat - All Lobes clear;diminished   Breath Sounds Post-Respiratory Treatment   Posttreatment Heart Rate (beats/min) 91   Posttreatment Resp Rate (breaths/min) 22   Post treatment O2 Sats - (TCU only) 98   Breath Sounds Posttreatment All Fields aeration increased;diminished;clear

## 2024-05-17 NOTE — PROVIDER NOTIFICATION
I am evaluating this patient for upcoming Left Total Knee Arthroplasty with Dr. Velasquez at [facility] on 5/20/24:    - Notified by preop RN today that patient fell at home this week, was evaluated in [facility] ED and is currently admitted. I notified Dr. Velasquez's team. Dr. Velasquez or someone from his team is going to assess this patient over this weekend to determine if he is still able to proceed with left total knee surgery on 5/20/24. Dr. Velasquez's team knows that if patient is not able to proceed with surgery, they need to call either [facility] Surgery Scheduling at [phone] or [facility] [phone] to notify them that surgery is cancelled. Call me below if any questions.       RANJAN Hein, CNP   Advanced Practice Nurse Navigator- Orthopedics  [facility]   Office Phone: [phone]  Direct Fax: [phone]

## 2024-05-17 NOTE — PROGRESS NOTES
Sepsis Evaluation     I was called to see Lance Ibrahim due to a change in vital signs. He is known to have a urinary tract infection. Currently on Levaquin.    PHYSICAL EXAM  Vital Signs:  Temp: 99.9  F (37.7  C) Temp src: Oral BP: (!) 88/52 Pulse: 118   Resp: 22 SpO2: 94 % O2 Device: Oxymask Oxygen Delivery: 3 LPM    General:  diaphoretic  Cardiac: tachycardic, regular rhythm  Mental Status: AAOx4.     Remainder of physical exam is insignificant.    DATA  Lactic Acid   Date Value Ref Range Status   04/26/2024 1.8 0.7 - 2.0 mmol/L Final   04/26/2024 3.0 (H) 0.7 - 2.0 mmol/L Final   07/28/2020 1.6 0.7 - 2.0 mmol/L Final   07/28/2020 3.0 (H) 0.7 - 2.0 mmol/L Final     Lactate for Sepsis Protocol   Date Value Ref Range Status   12/10/2018 2.8 (H) 0.7 - 2.0 mmol/L Final     Comment:     Significant value called to and read back by  SAVANNAH OVALLE ([id number]) ON 12.10.18 AT 0054 BY KV     10/20/2018 2.1 (H) 0.7 - 2.0 mmol/L Final     Comment:     Called to  SRIKANTH MANNING ([id number]) ON 10.20.18 AT 0839 BY EK         ASSESSMENT AND PLAN  Vital signs, lab and physical exam findings constitute a diagnosis of SEVERE SEPSIS, based on:SIRS criteria met and Lab results revealing acute organ dysfunction due to infection (ex: Cr, Bili, Plt)    - Lactic acid  - 1L NS bolus to be given over 1 hour due to heart failure diagnosis  - Blood cultures (drawn after antibiotics)  - Continue tylenol  - Ice packs to stay cool     Anti-infectives (From now, onward)      Start     Dose/Rate Route Frequency Ordered Stop    05/18/24 1400  levofloxacin (LEVAQUIN) infusion 250 mg         250 mg  50 mL/hr over 60 Minutes Intravenous EVERY 24 HOURS 05/17/24 1407            Current antibiotic coverage is appropriate for source of infection. Will consider broadening antibiotics if patient does not respond to fluids.    3 Hour Severe Sepsis Bundle Completion:  1. Initial Lactic Acid result shown above. Repeat lactic acid.  2. Blood Cultures: Ordered, but  antibiotics previously started.  3. Broad Spectrum Antibiotics Administered: yes  4. Is hypotension present? Yes. (Definition - 2 SBPs <90, MAP <65, or decrease > 40 from baseline)   Full 30 mL/kg bolus intentionally NOT administered to this patient due to CHF and acute Pulmonary Edema. The target volume to infuse in this patient is 2L.    BMI Readings from Last 1 Encounters:   05/16/24 28.48 kg/m      30 mL/kg fluids based on weight: 2,860 mL  30 mL/kg fluids based on IBW (must be >= 60 inches tall): 2,330 mL    Disposition: The patient will remain on the current unit. We will continue to monitor this patient closely.    Alma Mcdaniel MD  05/17/24, 4:08 PM    Sepsis Criteria   Sepsis: The body's generalized inflammatory state as a response to an infection. Sepsis Predictive Model includes >80 variable to alert to potential sepsis.  Severe Sepsis: Sepsis plus one or more variables of acute organ dysfunction (Note: lactic acid >2 or acute encephalopathy each qualify as organ dysfunction)  Septic Shock: Sepsis AND hypotension despite adequate volume resuscitation with crystalloid or lactic acid >=4  Note: HYPOTENSION is defined as 2 BP readings measured 3 hrs apart that have a SBP <90, MAP <65, or decrease >40 mmHg, occurring 6 hrs before or after t-zero

## 2024-05-17 NOTE — PROGRESS NOTES
Pt stated he never used a cpap before and does not want to use one here either. He is current on o2. RT will continue to monitor

## 2024-05-17 NOTE — PROGRESS NOTES
05/17/24 1430   Appointment Info   Signing Clinician's Name / Credentials (OT) Lakia Stevens OTR   Rehab Comments (OT) OT ALICIA   Quick Adds   Quick Adds Certification   Living Environment   People in Home alone   Current Living Arrangements apartment   Home Accessibility no concerns   Number of Stairs, Within Home, Primary none   Transportation Anticipated family or friend will provide   Self-Care   Usual Activity Tolerance fair   Current Activity Tolerance poor   Equipment Currently Used at Home other (see comments)  (4WW)   Fall history within last six months yes   Activity/Exercise/Self-Care Comment Pt was living alone, taking care of himself but borderline functioning   General Information   Onset of Illness/Injury or Date of Surgery 05/16/24   Referring Physician Tracy Adams   Patient/Family Therapy Goal Statement (OT) Unable to state   Additional Occupational Profile Info/Pertinent History of Current Problem Lance Ibrahim is a 79 year old male admitted on 5/16/2024. 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   Cognitive Status Examination   Affect/Mental Status (Cognitive) low arousal/lethargic;flat/blunted affect   Pain Assessment   Patient Currently in Pain Yes, see Vital Sign flowsheet   Bed Mobility   Comment (Bed Mobility) Total assistance for any bed mobility; unable to transition to sitting.   Transfers   [place] to [place]   Activities of Daily Living   BADL Assessment/Intervention upper body  dressing;lower body dressing;grooming;toileting   Upper Body Dressing Assessment/Training   Comment, (Upper Body Dressing) Dependent   Lower Body Dressing Assessment/Training   Comment, (Lower Body Dressing) Dependent   Grooming Assessment/Training   Comment, (Grooming) Dependent   Toileting   Comment, (Toileting) Dependent   Clinical Impression   Criteria for Skilled Therapeutic Interventions Met (OT) Yes, treatment indicated   OT Diagnosis Decreased indep with ADL   Influenced by the following impairments Medical status   OT Problem List-Impairments impacting ADL problems related to;pain;activity tolerance impaired;strength   Assessment of Occupational Performance 3-5 Performance Deficits   Planned Therapy Interventions (OT) ADL retraining;bed mobility training;transfer training   Clinical Decision Making Complexity (OT) detailed assessment/moderate complexity   Risk & Benefits of therapy have been explained evaluation/treatment results reviewed   OT Total Evaluation Time   OT Eval, Moderate Complexity Minutes (65785) 10   Therapy Certification   Medical Diagnosis Fall   Start of Care Date 05/17/24   Certification date from 05/17/24   Certification date to 06/16/24   OT Goals   Therapy Frequency (OT) 5 times/week   OT Predicted Duration/Target Date for Goal Attainment 05/30/24   OT Goals Hygiene/Grooming;Bed Mobility;Toilet Transfer/Toileting   OT: Hygiene/Grooming supervision/stand-by assist   OT: Bed Mobility Moderate assist;supine to/from sitting   OT: Toilet Transfer/Toileting Moderate assist;toilet transfer   OT Discharge Planning   OT Plan Progress bed mob, atempt self-care   OT Discharge Recommendation (DC Rec) (S)  Transitional Care Facility   OT Rationale for DC Rec Pt not able to tolerate movement due to pain; attempted to sit up on gurney, but he could not tolerate the movement. Would not be able to dc home in this condition   OT Brief overview of current status Dependent for all care/mobility, unable  to tolerate movement   Total Session Time   Total Session Time (sum of timed and untimed services) 10      [facility]  OUTPATIENT OCCUPATIONAL THERAPY  EVALUATION  PLAN OF TREATMENT FOR OUTPATIENT REHABILITATION  (COMPLETE FOR INITIAL CLAIMS ONLY)  Patient's Last Name, First Name, M.I.  YOB: 1945  Lance Ibrahim                          Provider's Name  [facility] Medical Record No.  [id number]                             Onset Date:  05/16/24   Start of Care Date:  05/17/24   Type:     ___PT   _X_OT   ___SLP Medical Diagnosis:  Fall                    OT Diagnosis:  Decreased indep with ADL Visits from SOC:  1     See note for plan of treatment, functional goals and certification details    I CERTIFY THE NEED FOR THESE SERVICES FURNISHED UNDER        THIS PLAN OF TREATMENT AND WHILE UNDER MY CARE     (Physician co-signature of this document indicates review and certification of the therapy plan).

## 2024-05-17 NOTE — PROVIDER NOTIFICATION
Paged resident, Patients' blood pressure is 112/55 with a MAP of 77. I was just curious about parameters for his Lisinopril. There are none on the MAR. Please advise.   Jing Silverman RN on 5/17/2024 at 12:13 AM    Ordered to hold dose by resident.  Jing Silverman RN on 5/17/2024 at 12:15 AM

## 2024-05-17 NOTE — PROVIDER NOTIFICATION
1549: Provider paged: Last BPs 89/49 and 88/52, HR is upper 110s to low 120s. Pt diaphoretic. Alert and responsive to voice.    NS bolus ordered and lactic acid drawn.    1628: Provider repaged for dropping BP, MAP < 65. MD at bedside.     Labs and blood cultures collected, EKG done, pressors initiated per order. Care assumed by NISA Jonas.

## 2024-05-17 NOTE — PLAN OF CARE
Problem: Fall Injury Risk  Goal: Absence of Fall and Fall-Related Injury  Outcome: Progressing  Intervention: Identify and Manage Contributors  Recent Flowsheet Documentation  Taken 5/16/2024 2318 by Jing Silverman RN  Medication Review/Management: medications reviewed  Intervention: Promote Injury-Free Environment  Recent Flowsheet Documentation  Taken 5/17/2024 0300 by Jing Silverman RN  Safety Promotion/Fall Prevention:   activity supervised   assistive device/personal items within reach   increased rounding and observation   increase visualization of patient   lighting adjusted   mobility aid in reach   nonskid shoes/slippers when out of bed   room organization consistent   room near nurse's station   safety round/check completed   supervised activity  Taken 5/17/2024 0100 by Jing Silverman RN  Safety Promotion/Fall Prevention:   activity supervised   assistive device/personal items within reach   increased rounding and observation   increase visualization of patient   lighting adjusted   mobility aid in reach   nonskid shoes/slippers when out of bed   room organization consistent   room near nurse's station   safety round/check completed   supervised activity  Taken 5/16/2024 2318 by Jing Silverman RN  Safety Promotion/Fall Prevention:   activity supervised   assistive device/personal items within reach   increased rounding and observation   increase visualization of patient   lighting adjusted   mobility aid in reach   nonskid shoes/slippers when out of bed   room organization consistent   room near nurse's station   safety round/check completed   supervised activity     Problem: Pain Chronic (Persistent)  Goal: Optimal Pain Control and Function  Outcome: Progressing  Intervention: Develop Pain Management Plan  Recent Flowsheet Documentation  Taken 5/16/2024 2318 by Jing Silverman RN  Pain Management Interventions:   emotional support   rest  Intervention: Manage Persistent Pain  Recent Flowsheet  Documentation  Taken 5/16/2024 2318 by Jing Silverman RN  Medication Review/Management: medications reviewed     Problem: Gas Exchange Impaired  Goal: Optimal Gas Exchange  Outcome: Progressing  Intervention: Optimize Oxygenation and Ventilation  Recent Flowsheet Documentation  Taken 5/17/2024 0200 by Jing Silverman RN  Head of Bed ([facility]) Positioning: HOB at 30 degrees  Taken 5/17/2024 0000 by Jing Silverman RN  Head of Bed ([facility]) Positioning: HOB at 30 degrees  Taken 5/16/2024 2318 by Jing Silverman RN  Head of Bed ([facility]) Positioning: HOB at 30 degrees   Goal Outcome Evaluation:  Patient A&O x4, able to make needs known. VSS on 3L O2 via oxymask. On continuous pulse ox. Pain rated 2/10 in shoulders and back. Right PIV infusing NS at 100mL/hr. Denied SOB, ISBELL, and chest pain. Lung sounds diminished. BLE numbness and tingling. Red legs. +1 BLE edema. Scattered bruising and scabbing. Redness blanchable on buttocks/IT, Q2 turns. Uses urinal at bedside, incontinent at times. Small BM during shift. ACHS sugar checks, insulin coverage per sliding scale. 60g carb diet, tolerating well. Assist of 2 with walker and gait belt, not out of bed during shift. Discharge pending.

## 2024-05-18 ENCOUNTER — APPOINTMENT (OUTPATIENT)
Dept: RADIOLOGY | Facility: CLINIC | Age: 79
DRG: 871 | End: 2024-05-18
Attending: STUDENT IN AN ORGANIZED HEALTH CARE EDUCATION/TRAINING PROGRAM
Payer: COMMERCIAL

## 2024-05-18 ENCOUNTER — APPOINTMENT (OUTPATIENT)
Dept: CARDIOLOGY | Facility: CLINIC | Age: 79
DRG: 871 | End: 2024-05-18
Payer: COMMERCIAL

## 2024-05-18 ENCOUNTER — APPOINTMENT (OUTPATIENT)
Dept: PHYSICAL THERAPY | Facility: CLINIC | Age: 79
DRG: 871 | End: 2024-05-18
Payer: COMMERCIAL

## 2024-05-18 LAB
ACINETOBACTER SPECIES: NOT DETECTED
ALBUMIN SERPL BCG-MCNC: 2.2 G/DL (ref 3.5–5.2)
ALP SERPL-CCNC: 157 U/L (ref 40–150)
ALT SERPL W P-5'-P-CCNC: 43 U/L (ref 0–70)
ANION GAP SERPL CALCULATED.3IONS-SCNC: 12 MMOL/L (ref 7–15)
AST SERPL W P-5'-P-CCNC: 31 U/L (ref 0–45)
BACTERIA UR CULT: ABNORMAL
BILIRUB DIRECT SERPL-MCNC: 0.79 MG/DL (ref 0–0.3)
BILIRUB SERPL-MCNC: 1.1 MG/DL
BUN SERPL-MCNC: 52.2 MG/DL (ref 8–23)
CALCIUM SERPL-MCNC: 7.9 MG/DL (ref 8.8–10.2)
CHLORIDE SERPL-SCNC: 106 MMOL/L (ref 98–107)
CITROBACTER SPECIES: NOT DETECTED
CREAT SERPL-MCNC: 1.87 MG/DL (ref 0.67–1.17)
CTX-M: NOT DETECTED
DEPRECATED HCO3 PLAS-SCNC: 20 MMOL/L (ref 22–29)
EGFRCR SERPLBLD CKD-EPI 2021: 36 ML/MIN/1.73M2
ENTEROBACTER SPECIES: NOT DETECTED
ERYTHROCYTE [DISTWIDTH] IN BLOOD BY AUTOMATED COUNT: 17.7 % (ref 10–15)
ESCHERICHIA COLI: NOT DETECTED
GLUCOSE BLDC GLUCOMTR-MCNC: 170 MG/DL (ref 70–99)
GLUCOSE BLDC GLUCOMTR-MCNC: 189 MG/DL (ref 70–99)
GLUCOSE BLDC GLUCOMTR-MCNC: 208 MG/DL (ref 70–99)
GLUCOSE BLDC GLUCOMTR-MCNC: 289 MG/DL (ref 70–99)
GLUCOSE SERPL-MCNC: 249 MG/DL (ref 70–99)
HCT VFR BLD AUTO: 26.6 % (ref 40–53)
HGB BLD-MCNC: 8.6 G/DL (ref 13.3–17.7)
IMP: NOT DETECTED
KLEBSIELLA OXYTOCA: DETECTED
KLEBSIELLA PNEUMONIAE: NOT DETECTED
KPC: NOT DETECTED
LVEF ECHO: NORMAL
MAGNESIUM SERPL-MCNC: 1.9 MG/DL (ref 1.7–2.3)
MAGNESIUM SERPL-MCNC: 2 MG/DL (ref 1.7–2.3)
MCH RBC QN AUTO: 29.2 PG (ref 26.5–33)
MCHC RBC AUTO-ENTMCNC: 32.3 G/DL (ref 31.5–36.5)
MCV RBC AUTO: 90 FL (ref 78–100)
NDM: NOT DETECTED
OXA (DETECTED/NOT DETECTED): NOT DETECTED
PHOSPHATE SERPL-MCNC: 3.5 MG/DL (ref 2.5–4.5)
PLATELET # BLD AUTO: 137 10E3/UL (ref 150–450)
POTASSIUM SERPL-SCNC: 3.8 MMOL/L (ref 3.4–5.3)
POTASSIUM SERPL-SCNC: 3.8 MMOL/L (ref 3.4–5.3)
PROT SERPL-MCNC: 5.4 G/DL (ref 6.4–8.3)
PROTEUS SPECIES: NOT DETECTED
PSEUDOMONAS AERUGINOSA: NOT DETECTED
RBC # BLD AUTO: 2.95 10E6/UL (ref 4.4–5.9)
SODIUM SERPL-SCNC: 138 MMOL/L (ref 135–145)
VIM: NOT DETECTED
WBC # BLD AUTO: 10.7 10E3/UL (ref 4–11)

## 2024-05-18 PROCEDURE — 94640 AIRWAY INHALATION TREATMENT: CPT

## 2024-05-18 PROCEDURE — 250N000011 HC RX IP 250 OP 636: Performed by: STUDENT IN AN ORGANIZED HEALTH CARE EDUCATION/TRAINING PROGRAM

## 2024-05-18 PROCEDURE — 250N000013 HC RX MED GY IP 250 OP 250 PS 637

## 2024-05-18 PROCEDURE — 99233 SBSQ HOSP IP/OBS HIGH 50: CPT | Mod: GC

## 2024-05-18 PROCEDURE — 97530 THERAPEUTIC ACTIVITIES: CPT | Mod: GP

## 2024-05-18 PROCEDURE — 999N000157 HC STATISTIC RCP TIME EA 10 MIN

## 2024-05-18 PROCEDURE — 250N000013 HC RX MED GY IP 250 OP 250 PS 637: Performed by: STUDENT IN AN ORGANIZED HEALTH CARE EDUCATION/TRAINING PROGRAM

## 2024-05-18 PROCEDURE — 250N000009 HC RX 250

## 2024-05-18 PROCEDURE — 84132 ASSAY OF SERUM POTASSIUM: CPT | Performed by: STUDENT IN AN ORGANIZED HEALTH CARE EDUCATION/TRAINING PROGRAM

## 2024-05-18 PROCEDURE — 82040 ASSAY OF SERUM ALBUMIN: CPT | Performed by: STUDENT IN AN ORGANIZED HEALTH CARE EDUCATION/TRAINING PROGRAM

## 2024-05-18 PROCEDURE — 84100 ASSAY OF PHOSPHORUS: CPT

## 2024-05-18 PROCEDURE — 80053 COMPREHEN METABOLIC PANEL: CPT

## 2024-05-18 PROCEDURE — 85027 COMPLETE CBC AUTOMATED: CPT

## 2024-05-18 PROCEDURE — 255N000002 HC RX 255 OP 636: Performed by: STUDENT IN AN ORGANIZED HEALTH CARE EDUCATION/TRAINING PROGRAM

## 2024-05-18 PROCEDURE — 83735 ASSAY OF MAGNESIUM: CPT

## 2024-05-18 PROCEDURE — 120N000013 HC R&B IMCU

## 2024-05-18 PROCEDURE — 83735 ASSAY OF MAGNESIUM: CPT | Performed by: STUDENT IN AN ORGANIZED HEALTH CARE EDUCATION/TRAINING PROGRAM

## 2024-05-18 PROCEDURE — 93306 TTE W/DOPPLER COMPLETE: CPT | Mod: 26 | Performed by: INTERNAL MEDICINE

## 2024-05-18 PROCEDURE — 71045 X-RAY EXAM CHEST 1 VIEW: CPT

## 2024-05-18 PROCEDURE — 80048 BASIC METABOLIC PNL TOTAL CA: CPT

## 2024-05-18 RX ORDER — HYDROMORPHONE HYDROCHLORIDE 2 MG/1
2 TABLET ORAL
Status: DISCONTINUED | OUTPATIENT
Start: 2024-05-18 | End: 2024-05-21

## 2024-05-18 RX ORDER — IPRATROPIUM BROMIDE AND ALBUTEROL SULFATE 2.5; .5 MG/3ML; MG/3ML
3 SOLUTION RESPIRATORY (INHALATION) EVERY 4 HOURS PRN
Status: DISCONTINUED | OUTPATIENT
Start: 2024-05-18 | End: 2024-05-22 | Stop reason: HOSPADM

## 2024-05-18 RX ADMIN — INSULIN ASPART 1 UNITS: 100 INJECTION, SOLUTION INTRAVENOUS; SUBCUTANEOUS at 08:19

## 2024-05-18 RX ADMIN — SODIUM BICARBONATE 650 MG TABLET 1950 MG: at 14:54

## 2024-05-18 RX ADMIN — DICLOFENAC 2 G: 10 GEL TOPICAL at 08:22

## 2024-05-18 RX ADMIN — ASPIRIN 81 MG: 81 TABLET, COATED ORAL at 08:47

## 2024-05-18 RX ADMIN — ACETAMINOPHEN 975 MG: 325 TABLET ORAL at 08:47

## 2024-05-18 RX ADMIN — ACETAMINOPHEN 975 MG: 325 TABLET ORAL at 14:54

## 2024-05-18 RX ADMIN — DICLOFENAC 2 G: 10 GEL TOPICAL at 17:07

## 2024-05-18 RX ADMIN — URSOSIOL 300 MG: 300 CAPSULE ORAL at 08:26

## 2024-05-18 RX ADMIN — HYDROMORPHONE HYDROCHLORIDE 0.2 MG: 0.2 INJECTION, SOLUTION INTRAMUSCULAR; INTRAVENOUS; SUBCUTANEOUS at 06:57

## 2024-05-18 RX ADMIN — HEPARIN SODIUM 5000 UNITS: 5000 INJECTION, SOLUTION INTRAVENOUS; SUBCUTANEOUS at 14:54

## 2024-05-18 RX ADMIN — INSULIN ASPART 2 UNITS: 100 INJECTION, SOLUTION INTRAVENOUS; SUBCUTANEOUS at 17:16

## 2024-05-18 RX ADMIN — HEPARIN SODIUM 5000 UNITS: 5000 INJECTION, SOLUTION INTRAVENOUS; SUBCUTANEOUS at 21:46

## 2024-05-18 RX ADMIN — SODIUM BICARBONATE 650 MG TABLET 1950 MG: at 20:15

## 2024-05-18 RX ADMIN — GABAPENTIN 300 MG: 300 CAPSULE ORAL at 03:33

## 2024-05-18 RX ADMIN — HYDROXYZINE HYDROCHLORIDE 25 MG: 25 TABLET ORAL at 04:30

## 2024-05-18 RX ADMIN — PERFLUTREN 2 ML: 6.52 INJECTION, SUSPENSION INTRAVENOUS at 10:23

## 2024-05-18 RX ADMIN — HYDROMORPHONE HYDROCHLORIDE 2 MG: 2 TABLET ORAL at 06:10

## 2024-05-18 RX ADMIN — CEFEPIME 2 G: 2 INJECTION, POWDER, FOR SOLUTION INTRAVENOUS at 17:17

## 2024-05-18 RX ADMIN — IPRATROPIUM BROMIDE AND ALBUTEROL SULFATE 3 ML: .5; 3 SOLUTION RESPIRATORY (INHALATION) at 07:31

## 2024-05-18 RX ADMIN — Medication 1 TABLET: at 08:47

## 2024-05-18 RX ADMIN — INSULIN ASPART 1 UNITS: 100 INJECTION, SOLUTION INTRAVENOUS; SUBCUTANEOUS at 12:53

## 2024-05-18 RX ADMIN — HEPARIN SODIUM 5000 UNITS: 5000 INJECTION, SOLUTION INTRAVENOUS; SUBCUTANEOUS at 06:03

## 2024-05-18 RX ADMIN — DICLOFENAC 2 G: 10 GEL TOPICAL at 20:12

## 2024-05-18 RX ADMIN — POTASSIUM CHLORIDE 10 MEQ: 7.46 INJECTION, SOLUTION INTRAVENOUS at 00:32

## 2024-05-18 RX ADMIN — ACETAMINOPHEN 975 MG: 325 TABLET ORAL at 20:12

## 2024-05-18 RX ADMIN — CEFEPIME 2 G: 2 INJECTION, POWDER, FOR SOLUTION INTRAVENOUS at 06:03

## 2024-05-18 RX ADMIN — DICLOFENAC 2 G: 10 GEL TOPICAL at 12:54

## 2024-05-18 RX ADMIN — URSOSIOL 300 MG: 300 CAPSULE ORAL at 20:12

## 2024-05-18 ASSESSMENT — ACTIVITIES OF DAILY LIVING (ADL)
ADLS_ACUITY_SCORE: 34
ADLS_ACUITY_SCORE: 38
ADLS_ACUITY_SCORE: 40
ADLS_ACUITY_SCORE: 38
ADLS_ACUITY_SCORE: 40
ADLS_ACUITY_SCORE: 38
DEPENDENT_IADLS:: INDEPENDENT
ADLS_ACUITY_SCORE: 34
ADLS_ACUITY_SCORE: 38
ADLS_ACUITY_SCORE: 40
ADLS_ACUITY_SCORE: 38
ADLS_ACUITY_SCORE: 40
ADLS_ACUITY_SCORE: 40
ADLS_ACUITY_SCORE: 38
ADLS_ACUITY_SCORE: 34
ADLS_ACUITY_SCORE: 40
ADLS_ACUITY_SCORE: 38

## 2024-05-18 NOTE — PROGRESS NOTES
Resident provider notified of patient's 9 beat run of V Tach. Patient was sleeping at this time. Plan is to continue to monitor at this time.

## 2024-05-18 NOTE — ED NOTES
Hand off report given to Aldo RN patient is ready to transfer to ICU.  Pritesh Valerio RN  5/17/2024  8:08 PM

## 2024-05-18 NOTE — CONSULTS
Care Management Initial Consult    General Information  Assessment completed with: Patient   Type of CM/SW Visit: Initial Assessment    Primary Care Provider verified and updated as needed: Yes   Readmission within the last 30 days: other (see comments)      Reason for Consult: discharge planning  Advance Care Planning: Advance Care Planning Reviewed: present on chart          Communication Assessment  Patient's communication style: spoken language (English or Bilingual)    Hearing Difficulty or Deaf: no   Wear Glasses or Blind: yes                     Living Environment:   People in home: alone     Current living Arrangements: apartment      Able to return to prior arrangements:  TBD         Family/Social Support:  Care provided by: self  Provides care for: no one  Marital Status:    (step-son)          Description of Support System: Supportive, Involved           Current Resources:   Patient receiving home care services: No  Community Resources: None  Equipment currently used at home: walker, rolling, shower chair, grab bar, tub/shower, grab bar, toilet  Supplies currently used at home: Diabetic Supplies      Employment/Financial:  Employment Status: retired     Financial Concerns: none           Functional Status:  Prior to admission patient needed assistance:   Dependent ADLs:: Ambulation-walker  Dependent IADLs:: Independent       Mental Health Status:  Mental Health Status: No Current Concerns         Chemical Dependency Status:  Chemical Dependency Status: No Current Concerns               Values/Beliefs:  Spiritual, Cultural Beliefs, Yazdanism Practices, Values that affect care: no                   Additional Information:  CM reviewed chart. CM met with patient - introduced self and role of CM. Assessed. Patient lives alone in an apartment. Independent at baseline. Care provided by: self  Provides care for: no one. Marital Status: . Description of Support System: (step-son) Supportive,  Involved. Prior to admission patient needed assistance: Dependent ADLs:: Ambulation-walker. Dependent IADLs:: Independent Mental Health Status: No Current Concerns/denied. Chemical Dependency Status: No Current Concerns/denied. Patient is agreeable to TCU and gave TCU choices to CM. Patient requests that CM arrange transportation to TCU at discharge. Patient is aware that he may be billed for transportation and agreeable. TCU referrals  pending. CM will follow.         TCU Referrals pending  [facility] (SNF)   [facility] (SNF)       PT recommendations: Transitional Care Facility  OT recommendations: Transitional Care Facility        CM referral for Community Resources and Found down at home x several days, lives home alone without support; will likely need placement      Polina Zavala RN

## 2024-05-18 NOTE — PROGRESS NOTES
During patient's medication pass this morning, patient started choking while taking a tylenol. No issues noted with drinking prior to this. Patient had drank 2 full cups of water prior, as well as swallowed pills without issue. Patient's oxygen sats noted to drop into the low 70s. Other staff came to bedside to assist writer during this time. Patient spit up water and started coughing. Oxygen increased to 8L via oxymask. Oxygen sats increased to the 90s. Patient able to start talking. Resident provider notified. CXR ordered.     Bedside swallow eval performed with no issues noted.    Patient resting comfortably at this time.    Lin Katz RN

## 2024-05-18 NOTE — PROCEDURES
[facility]    Triple Lumen PICC Placement    Date/Time: 5/17/2024 8:41 PM    Performed by: Allyssa Mccurdy RN  Authorized by: Tracy Adams MD  Indications: vascular access      UNIVERSAL PROTOCOL   Site Marked: NA  Prior Images Obtained and Reviewed:  NA  Required items: Required blood products, implants, devices and special equipment available    Patient identity confirmed:  Verbally with patient, arm band and provided demographic data  NA - No sedation, light sedation, or local anesthesia  Confirmation Checklist:  Patient's identity using two indicators, relevant allergies, procedure was appropriate and matched the consent or emergent situation and correct equipment/implants were available  Time out: Immediately prior to the procedure a time out was called    Universal Protocol: the Joint Commission Universal Protocol was followed    Preparation: Patient was prepped and draped in usual sterile fashion    ESBL (mL):  2     ANESTHESIA    Anesthesia:  Local infiltration  Local Anesthetic:  Lidocaine 1% without epinephrine  Anesthetic Total (mL):  0.4      SEDATION    Patient Sedated: No        Preparation: skin prepped with 2% chlorhexidine  Skin prep agent: skin prep agent completely dried prior to procedure  Sterile barriers: maximum sterile barriers were used: cap, mask, sterile gown, sterile gloves, and large sterile sheet  Hand hygiene: hand hygiene performed prior to central venous catheter insertion  Type of line used: PICC  Catheter type: triple lumen  Lumen type: valved and power PICC  Lumen Identification: Gray, White and Red  Catheter size: 5 Fr  Brand: Bard  Lot number: [id number]  Placement method: MST and ultrasound  Number of attempts: 1  Difficulty threading catheter: no  Successful placement: yes  Orientation: right  Catheter to Vein (%): 22  Location: basilic vein  Tip Location: SVC/RA Junction  Arm circumference: adults 10 cm  Extremity circumference: 34  Visible catheter  "length: 2  Total catheter length: 49  Dressing and securement: antibiotic disc placed, statlock and transparent dressing  Post procedure assessment: blood return through all ports, free fluid flow, placement verified by x-ray and placement verified by 3CG technology (Unable to download 3CG image so xray obtained)  PROCEDURE   Patient Tolerance:  Patient tolerated the procedure well with no immediate complicationsDescribe Procedure: Consent per patient. Uneventful PICC placement on first attempt. Brisk blood return and flushes easily all lumens. 3CG was utilized and peaked \"P\" waves noted. Line secured with 1cm external. (2cm from insertion to hub.) Unable to capture 3CG image so xray taken to confirm placement. Per radiologist read right PICC tip over CA junction. Patient tolerated well. Primary RN aware PICC is good to use. Educational material left for patient.   Disposal: sharps and needle count correct at the end of procedure, needles and guidewire disposed in sharps container        "

## 2024-05-18 NOTE — PLAN OF CARE
I Illness Severity: Stable    PPatient Summary:      Telemetry Orders: Yes    Interpretation of Cardiac Rhythm: SR    Dyspnea improved and O2 sats >88% at RA or at prior home O2 therapy level:  No 3L oxymask    Last Bowel Movement: Prior to admission    Acute Symptoms or Concerns: knee/back pain this morning.        Is this a new or worsening symptom or concern? No    Is this symptom or concern being adequately managed? Yes    Shift Summary:     AActions:    Diagnostic testing and/or procedures completed, and results are available for discharge:  Not Met BC pending    SSituational Awareness:      Anticipated post discharge treatment plan formulated and the following needs have been identified:        SSynthesis:     Was bedside rounding completed on your shift? No     What team members present during bedside rounds?  RN      Goal Outcome Evaluation:       Pt slept well overnight.  Started c/o more pain toward end of shift.  PRN gabapentin, dilaudid and hydroxyzine given to manage pain.  Able to titrate off levo.  VS stable.  Pt on 3L oxymask.         Overall Patient Progress: improvingOverall Patient Progress: improving

## 2024-05-18 NOTE — PHARMACY-VANCOMYCIN DOSING SERVICE
Pharmacy Vancomycin Initial Note  Date of Service May 17, 2024  Patient's  1945  79 year old, male    Indication: Sepsis    Current estimated CrCl = Estimated Creatinine Clearance: 35.8 mL/min (A) (based on SCr of 1.97 mg/dL (H)).    Creatinine for last 3 days  2024:  4:22 PM Creatinine 1.98 mg/dL;  6:38 PM Creatinine 1.91 mg/dL  2024:  6:02 AM Creatinine 1.95 mg/dL;  4:55 PM Creatinine 1.97 mg/dL    Recent Vancomycin Level(s) for last 3 days  No results found for requested labs within last 3 days.      Vancomycin IV Administrations (past 72 hours)                     vancomycin (VANCOCIN) 1,500 mg in 0.9% NaCl 250 mL intermittent infusion (mg) 1,500 mg New Bag 24 1842                    Nephrotoxins and other renal medications (From now, onward)      Start     Dose/Rate Route Frequency Ordered Stop    24 1900  vancomycin (VANCOCIN) 1,000 mg in NaCl 0.9% 200 mL intermittent infusion         1,000 mg  over 60 Minutes Intravenous EVERY 24 HOURS 24 1818      24 2100  norepinephrine (LEVOPHED) 4 mg in  mL infusion PREMIX         0.01-0.6 mcg/kg/min × 95.3 kg  3.6-214.4 mL/hr  Intravenous CONTINUOUS 24 2047              Contrast Orders - past 72 hours (72h ago, onward)      Start     Dose/Rate Route Frequency Stop    24 2200  iopamidol (ISOVUE-370) solution 75 mL         75 mL Intravenous ONCE 24 2215            [employer] Prediction of Planned Initial Vancomycin Regimen  Loading dose: 1500mg IV x 1  Regimen: 1000 mg IV every 24 hours.  Start time: 06:42 on 2024  Exposure target: AUC24 (range)400-600 mg/L.hr   AUC24,ss: 516 mg/L.hr  Probability of AUC24 > 400: 77 %  Ctrough,ss: 17.3 mg/L  Probability of Ctrough,ss > 20: 36 %  Probability of nephrotoxicity (Lodise LATISHA ): 13 %        Plan:  Start vancomycin  1500mg IV x 1 then 1000mg mg IV q24h.   Vancomycin monitoring method: AUC  Vancomycin therapeutic monitoring goal: 400-600 mg*h/L  Pharmacy  will check vancomycin levels as appropriate in 1-3 Days.    Serum creatinine levels will be ordered daily for the first week of therapy and at least twice weekly for subsequent weeks.      Alma Smallwood RPH

## 2024-05-18 NOTE — PROVIDER NOTIFICATION
RCAT Treatment Plan    Patient Score: 4   Patient Acuity: 5    Clinical Indication for Therapy: CHF/SOB    Therapy Ordered: Duoneb q4 PRN    Assessment Summary: Pt was weaned down to 1 lpm oxymask. Pt BS were clear/diminished throughout when last seen. Pt quit smoking in 1993. Per RCAT protocol score, nebs changed to q4 PRN.     Bj James, RT  5/18/2024 05/18/24 0733   RCAT Assessment   Reason for Assessment CHF   Pulmonary Status 1   Surgical Status 0   Chest X-ray 1   Respiratory Pattern 0   Mental Status 0   Breath Sounds 0   Cough Effectiveness 0   Level of Activity 1   O2 Required for SpO2>=92% 1   Acuity Level (points) 4   Acuity Level  5

## 2024-05-18 NOTE — PROGRESS NOTES
[facility]    Progress Note - Hospitalist Service       Date of Admission:  5/16/2024    Assessment & Plan   Lance Ibrahim is a 79 year old male admitted on 5/16/2024. 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      Sepsis  Klebsiella oxytoca UTI  Hypotension  Acute hypoxic respiratory failure  Patient became hypotensive with blood pressures of 89/49 5/17.  Lactic acid was drawn and a fluid bolus was given.  Lactic acid was 1.3.  Patient did not respond to fluids so started on Levophed for blood pressure support.  Urine culture came back positive for Klebsiella oxytoca and started on Levaquin before transition to vancomycin and cefepime.  Blood cultures were drawn.  He had a PICC line placed and transferred to ICU.  Troponin normal.  Patient was able to titrate off Levophed this morning.  Initially required 8 l of supplemental oxygen but was able to titrate down to 3 L this a.m.  This morning patient choked on a Tylenol.  WBC 10.7 this a.m. Urine culture sensitivities are back showing resistance to ampicillin.  Patient was downgraded from ICU status to [facility].  - Chest x-ray  - Bedside swallow study  - Continue cefepime  - Discontinue vancomycin  - Continue supplementation with oxygen as needed to keep sats >90%  - Following blood cultures, currently negative  - AM CBC, BMP    Mechanical Fall  Osteoarthritis  Chronic pain  Lives home alone, unwitnessed mechanical fall 3 days prior to admission, remained down for several  days, unable to get up before son in law found him. Imaging in ER negative for acute process. Likely will need placement.  Patient has spinal stimulator, which she states he usually charges once a week.  Due to recent fall he has been unable to charge this, was brought in yesterday and charged. PT/OT recommends TCU.  - Scheduled Tylenol, heat, ice, Voltaren gel  - Dilaudid 2 mg for severe pain PRN  - Lidocaine patch     HFpEF  Last echo 3/2020 with EF 50-55%, mild LV global hyperkinesia, pulmonary HTN.  Patient does not appear fluid overloaded on exam.  BNP was elevated at 3157.  - Holding PTA torsemide, Imdur  - ECHO     DEAN on CKD  Baseline Cr 1.5-1.6, 1.98 on admission in setting of being down for several days, decreased PO, suspect prerenal.  Creatinine 1.87 this morning.  - Encourage PO   - AM BMP  - Hold PTA lisinopril      HTN  - Hold PTA amlodipine, lisinopril      Biliary obstruction with malignant stricture s/p EUS/ERCP 4/30/24 with sphincterotomy and temporary stent placement   Pruritus  Elevated liver chemistries, improving  LFT's downtrending on admission.   - Follow up outpatient with [facility] as recommended  - PTA Ursodiol 300 mg BID     DM neuropathy  Chronic pain  Has spinal cord stimulator.   - PTA gabapentin 300 mg TID PRN     DM2  A1c 8 two months ago. PTA glipizide, Rybelsus.   - Sliding scale insulin   - Glargine increased to 12 units at bedtime     Incidental pulmonary nodule  Seen on imaging last admission.   - Follow up outpatient as recommended      Gout  - Hold PTA colchicine        Diet: Combination Diet Regular Diet Adult; Moderate Consistent Carb (60 g CHO per Meal) Diet    DVT Prophylaxis: Heparin  Barnes Catheter: Not present  Fluids: po  Lines: PRESENT      PICC 05/17/24 Triple Lumen Right Basilic-Site Assessment: WDL    Cardiac Monitoring: ACTIVE order. Indication: Tachyarrhythmias, acute (48 hours)  Code Status: Full Code      Clinically Significant Risk Factors        # Hypokalemia:  Lowest K = 2.9 mmol/L in last 2 days, will replace as needed    # Hypercalcemia: corrected calcium is >10.1, will monitor as appropriate    # Hypoalbuminemia: Lowest albumin = 2.2 g/dL at 5/18/2024  3:18 AM, will monitor as appropriate     # Hypertension: Noted on problem list       # DMII: A1C = 8.0 % (Ref range: 0.0 - 5.6 %) within past 6 months, PRESENT ON ADMISSION  # Overweight: Estimated body mass index is 28.49 kg/m  as calculated from the following:    Height as of this encounter: 1.829 m (6').    Weight as of this encounter: 95.3 kg (210 lb 1.6 oz)., PRESENT ON ADMISSION            Disposition Plan      Expected Discharge Date: 05/20/2024                The patient's care was discussed with the Attending Physician, Dr. Adams .    Thais Suarez MD PGY1  Hospitalist Service  [facility]  Securely message with [employer] (more info)  Text page via [facility] Paging/Directory   ______________________________________________________________________    Interval History   Patient had an episode yesterday evening with hypotension caused by sepsis.  Patient was transferred to the ICU and started on pressors.  Improved overnight and was weaned off pressors.  Patient states he is feeling better this morning but is still complaining of pain.  Denies chest pain.    Physical Exam   Vital Signs: Temp: 97.4  F (36.3  C) Temp src: Oral BP: 90/54 Pulse: 86   Resp: 13 SpO2: 94 % O2 Device: Nasal cannula Oxygen Delivery: 2 LPM  Weight: 210 lbs 1.6 oz    GENERAL: alert and no acute distress  HENT: hearing grossly intact  RESP: lungs clear to auscultation - no rales, rhonchi or wheezes  CV: Regular rate and rhythm, normal S1 S2, no murmur appreciated  ABDOMEN: soft, nontender, and bowel sounds normal  PSYCH: mentation appears normal, affect normal/bright  EXTREMITIES: Mild lower extremity pitting edema    Data     I have personally reviewed the following data over the past 24 hrs:    10.7  \   8.6 (L)   /  137 (L)     138 106 52.2 (H) /  189 (H)   3.8; 3.8 20 (L) 1.87 (H) \     ALT: 43 AST: 31 AP: 157 (H) TBILI: 1.1   ALB: 2.2 (L) TOT PROTEIN: 5.4 (L) LIPASE: N/A     Trop: 43 (H) BNP: 2,058 (H)     Procal: N/A CRP: N/A Lactic Acid: 1.3         Imaging results reviewed over the past 24 hrs:   Recent Results (from the past 24 hour(s))   XR Chest 1 View    Narrative    EXAM: XR CHEST 1 VIEW  LOCATION: [facility]  DATE: 5/17/2024    INDICATION: SOB  COMPARISON: None.      Impression    IMPRESSION: Platelike atelectasis or infiltrate in the left base. Hyperinflation. Mild cardiac enlargement. Intrathecal catheter.   XR Chest Port 1 View    Narrative    EXAM: XR CHEST PORT 1 VIEW  LOCATION: [facility]  DATE: 5/17/2024    INDICATION: RN placed PICC   verify tip placement  COMPARISON: 5/17/2024      Impression    IMPRESSION: Right-sided PICC line with tip over atrial caval junction. No pneumothorax or pleural effusion. No focal consolidation. Borderline enlarged cardiac silhouette. Tortuous atherosclerotic aorta. Spinal stimulator device.   XR Chest 1 View    Narrative    EXAM: XR CHEST 1 VIEW  LOCATION: [facility]  DATE: 5/18/2024    INDICATION: Choked.  COMPARISON: Chest radiograph 5/17/2024.      Impression    IMPRESSION:     Right PICC tip is poorly visualized but is likely in the lower SVC. Unchanged spinal stimulator leads with tips overlying the mid thoracic spine. Left humeral screws are also unchanged.    Mild bibasilar atelectasis. No focal airspace opacities, pleural effusions, or pneumothorax. Pulmonary vascularity is within normal limits.    Stable cardiomediastinal silhouette. Aortic atherosclerotic calcifications.

## 2024-05-18 NOTE — PROGRESS NOTES
Pt received neb treatment and tolerated well,        05/17/24 212   Tech Time   $Tech Time (10 minute increments) 4   Nebulizer Assessment & Treatment   $RT Use ONLY Delivery Method Nebulizer - Additional   Nebulizer Device Mask   Pretreatment Heart Rate (beats/min) 79   Pretreatment Resp Rate (breaths/min) 16   Pretreatment O2 sats - (TCU only) 99   Pretreat Breath Sounds - Bilat - All Lobes clear;diminished   Patient Position HOB elevated   Breath Sounds Post-Respiratory Treatment   Posttreatment Heart Rate (beats/min) 79   Posttreatment Resp Rate (breaths/min) 16   Post treatment O2 Sats - (TCU only) 98   Breath Sounds Posttreatment All Fields All Fields   Breath Sounds Posttreatment All Fields no change     Aimee Velasco, RT

## 2024-05-18 NOTE — PROGRESS NOTES
RCAT Treatment Plan    Patient Score: 4  Patient Acuity: 5    Clinical Indication for Therapy: CHF/SOB    Therapy Ordered: Duoneb q4 PRN    Assessment Summary: Pt was weaned down to 1 lpm oxymask. BS were clear/diminished throughout. Pt quit smoking in 1993. Per RCAT score, changed nebs to q4 PRN.     Bj Erika, RT  5/18/2024 05/18/24 0733   RCAT Assessment   Reason for Assessment CHF   Pulmonary Status 1   Surgical Status 0   Chest X-ray 1   Respiratory Pattern 0   Mental Status 0   Breath Sounds 0   Cough Effectiveness 0   Level of Activity 1   O2 Required for SpO2>=92% 1   Acuity Level (points) 4   Acuity Level  5

## 2024-05-19 ENCOUNTER — APPOINTMENT (OUTPATIENT)
Dept: OCCUPATIONAL THERAPY | Facility: CLINIC | Age: 79
DRG: 871 | End: 2024-05-19
Payer: COMMERCIAL

## 2024-05-19 PROBLEM — N30.00 ACUTE CYSTITIS WITHOUT HEMATURIA: Status: ACTIVE | Noted: 2024-05-19

## 2024-05-19 PROBLEM — R65.20 SEVERE SEPSIS (H): Status: ACTIVE | Noted: 2018-05-01

## 2024-05-19 PROBLEM — R78.81 BACTEREMIA: Status: ACTIVE | Noted: 2024-05-19

## 2024-05-19 LAB
ANION GAP SERPL CALCULATED.3IONS-SCNC: 10 MMOL/L (ref 7–15)
BUN SERPL-MCNC: 47.2 MG/DL (ref 8–23)
CALCIUM SERPL-MCNC: 8.3 MG/DL (ref 8.8–10.2)
CHLORIDE SERPL-SCNC: 108 MMOL/L (ref 98–107)
CREAT SERPL-MCNC: 1.62 MG/DL (ref 0.67–1.17)
DEPRECATED HCO3 PLAS-SCNC: 22 MMOL/L (ref 22–29)
EGFRCR SERPLBLD CKD-EPI 2021: 43 ML/MIN/1.73M2
ERYTHROCYTE [DISTWIDTH] IN BLOOD BY AUTOMATED COUNT: 17.5 % (ref 10–15)
GLUCOSE BLDC GLUCOMTR-MCNC: 163 MG/DL (ref 70–99)
GLUCOSE BLDC GLUCOMTR-MCNC: 179 MG/DL (ref 70–99)
GLUCOSE BLDC GLUCOMTR-MCNC: 241 MG/DL (ref 70–99)
GLUCOSE BLDC GLUCOMTR-MCNC: 242 MG/DL (ref 70–99)
GLUCOSE SERPL-MCNC: 178 MG/DL (ref 70–99)
HCT VFR BLD AUTO: 26.7 % (ref 40–53)
HGB BLD-MCNC: 8.4 G/DL (ref 13.3–17.7)
MAGNESIUM SERPL-MCNC: 2 MG/DL (ref 1.7–2.3)
MCH RBC QN AUTO: 28.2 PG (ref 26.5–33)
MCHC RBC AUTO-ENTMCNC: 31.5 G/DL (ref 31.5–36.5)
MCV RBC AUTO: 90 FL (ref 78–100)
PLATELET # BLD AUTO: 121 10E3/UL (ref 150–450)
POTASSIUM SERPL-SCNC: 3.2 MMOL/L (ref 3.4–5.3)
POTASSIUM SERPL-SCNC: 3.7 MMOL/L (ref 3.4–5.3)
RBC # BLD AUTO: 2.98 10E6/UL (ref 4.4–5.9)
SODIUM SERPL-SCNC: 140 MMOL/L (ref 135–145)
WBC # BLD AUTO: 12 10E3/UL (ref 4–11)

## 2024-05-19 PROCEDURE — 80048 BASIC METABOLIC PNL TOTAL CA: CPT

## 2024-05-19 PROCEDURE — 250N000011 HC RX IP 250 OP 636: Performed by: STUDENT IN AN ORGANIZED HEALTH CARE EDUCATION/TRAINING PROGRAM

## 2024-05-19 PROCEDURE — 97535 SELF CARE MNGMENT TRAINING: CPT | Mod: GO

## 2024-05-19 PROCEDURE — 85027 COMPLETE CBC AUTOMATED: CPT

## 2024-05-19 PROCEDURE — 250N000011 HC RX IP 250 OP 636

## 2024-05-19 PROCEDURE — 84132 ASSAY OF SERUM POTASSIUM: CPT | Performed by: STUDENT IN AN ORGANIZED HEALTH CARE EDUCATION/TRAINING PROGRAM

## 2024-05-19 PROCEDURE — 99233 SBSQ HOSP IP/OBS HIGH 50: CPT | Mod: GC

## 2024-05-19 PROCEDURE — 83735 ASSAY OF MAGNESIUM: CPT | Performed by: STUDENT IN AN ORGANIZED HEALTH CARE EDUCATION/TRAINING PROGRAM

## 2024-05-19 PROCEDURE — 250N000013 HC RX MED GY IP 250 OP 250 PS 637

## 2024-05-19 PROCEDURE — 36415 COLL VENOUS BLD VENIPUNCTURE: CPT

## 2024-05-19 PROCEDURE — 258N000003 HC RX IP 258 OP 636

## 2024-05-19 PROCEDURE — 120N000004 HC R&B MS OVERFLOW

## 2024-05-19 PROCEDURE — 87040 BLOOD CULTURE FOR BACTERIA: CPT

## 2024-05-19 RX ORDER — KETOROLAC TROMETHAMINE 15 MG/ML
15 INJECTION, SOLUTION INTRAMUSCULAR; INTRAVENOUS EVERY 8 HOURS PRN
Status: DISCONTINUED | OUTPATIENT
Start: 2024-05-19 | End: 2024-05-21

## 2024-05-19 RX ORDER — POTASSIUM CHLORIDE 29.8 MG/ML
20 INJECTION INTRAVENOUS
Status: DISPENSED | OUTPATIENT
Start: 2024-05-19 | End: 2024-05-19

## 2024-05-19 RX ORDER — KETOROLAC TROMETHAMINE 15 MG/ML
15 INJECTION, SOLUTION INTRAMUSCULAR; INTRAVENOUS ONCE
Qty: 1 ML | Refills: 0 | Status: COMPLETED | OUTPATIENT
Start: 2024-05-19 | End: 2024-05-19

## 2024-05-19 RX ORDER — CEFTRIAXONE 2 G/1
2 INJECTION, POWDER, FOR SOLUTION INTRAMUSCULAR; INTRAVENOUS EVERY 24 HOURS
Status: DISCONTINUED | OUTPATIENT
Start: 2024-05-19 | End: 2024-05-21

## 2024-05-19 RX ORDER — METHOCARBAMOL 500 MG/1
500 TABLET, FILM COATED ORAL 4 TIMES DAILY PRN
Status: DISCONTINUED | OUTPATIENT
Start: 2024-05-19 | End: 2024-05-22 | Stop reason: HOSPADM

## 2024-05-19 RX ADMIN — HYDROMORPHONE HYDROCHLORIDE 2 MG: 2 TABLET ORAL at 23:53

## 2024-05-19 RX ADMIN — ACETAMINOPHEN 975 MG: 325 TABLET ORAL at 20:50

## 2024-05-19 RX ADMIN — DICLOFENAC 2 G: 10 GEL TOPICAL at 18:18

## 2024-05-19 RX ADMIN — HYDROMORPHONE HYDROCHLORIDE 0.2 MG: 0.2 INJECTION, SOLUTION INTRAMUSCULAR; INTRAVENOUS; SUBCUTANEOUS at 22:51

## 2024-05-19 RX ADMIN — URSOSIOL 300 MG: 300 CAPSULE ORAL at 10:08

## 2024-05-19 RX ADMIN — ACETAMINOPHEN 975 MG: 325 TABLET ORAL at 10:08

## 2024-05-19 RX ADMIN — DICLOFENAC 2 G: 10 GEL TOPICAL at 11:57

## 2024-05-19 RX ADMIN — HEPARIN SODIUM 5000 UNITS: 5000 INJECTION, SOLUTION INTRAVENOUS; SUBCUTANEOUS at 14:47

## 2024-05-19 RX ADMIN — ASPIRIN 81 MG: 81 TABLET, COATED ORAL at 10:07

## 2024-05-19 RX ADMIN — KETOROLAC TROMETHAMINE 15 MG: 15 INJECTION, SOLUTION INTRAMUSCULAR; INTRAVENOUS at 20:50

## 2024-05-19 RX ADMIN — INSULIN ASPART 3 UNITS: 100 INJECTION, SOLUTION INTRAVENOUS; SUBCUTANEOUS at 14:49

## 2024-05-19 RX ADMIN — DICLOFENAC 2 G: 10 GEL TOPICAL at 20:53

## 2024-05-19 RX ADMIN — KETOROLAC TROMETHAMINE 15 MG: 15 INJECTION, SOLUTION INTRAMUSCULAR; INTRAVENOUS at 11:52

## 2024-05-19 RX ADMIN — HEPARIN SODIUM 5000 UNITS: 5000 INJECTION, SOLUTION INTRAVENOUS; SUBCUTANEOUS at 05:31

## 2024-05-19 RX ADMIN — SODIUM BICARBONATE 650 MG TABLET 1950 MG: at 14:47

## 2024-05-19 RX ADMIN — METHOCARBAMOL 500 MG: 500 TABLET ORAL at 21:24

## 2024-05-19 RX ADMIN — HEPARIN SODIUM 5000 UNITS: 5000 INJECTION, SOLUTION INTRAVENOUS; SUBCUTANEOUS at 21:25

## 2024-05-19 RX ADMIN — SODIUM BICARBONATE 650 MG TABLET 1950 MG: at 10:08

## 2024-05-19 RX ADMIN — URSOSIOL 300 MG: 300 CAPSULE ORAL at 20:50

## 2024-05-19 RX ADMIN — POTASSIUM CHLORIDE 20 MEQ: 29.8 INJECTION, SOLUTION INTRAVENOUS at 10:07

## 2024-05-19 RX ADMIN — INSULIN ASPART 3 UNITS: 100 INJECTION, SOLUTION INTRAVENOUS; SUBCUTANEOUS at 18:17

## 2024-05-19 RX ADMIN — CEFEPIME 2 G: 2 INJECTION, POWDER, FOR SOLUTION INTRAVENOUS at 05:31

## 2024-05-19 RX ADMIN — SODIUM CHLORIDE 500 ML: 9 INJECTION, SOLUTION INTRAVENOUS at 03:21

## 2024-05-19 RX ADMIN — CEFTRIAXONE SODIUM 2 G: 2 INJECTION, POWDER, FOR SOLUTION INTRAMUSCULAR; INTRAVENOUS at 18:17

## 2024-05-19 RX ADMIN — Medication 1 TABLET: at 10:08

## 2024-05-19 RX ADMIN — SODIUM BICARBONATE 650 MG TABLET 1950 MG: at 20:50

## 2024-05-19 RX ADMIN — HYDROMORPHONE HYDROCHLORIDE 2 MG: 2 TABLET ORAL at 10:16

## 2024-05-19 RX ADMIN — ACETAMINOPHEN 975 MG: 325 TABLET ORAL at 14:47

## 2024-05-19 ASSESSMENT — ACTIVITIES OF DAILY LIVING (ADL)
ADLS_ACUITY_SCORE: 35
ADLS_ACUITY_SCORE: 38
ADLS_ACUITY_SCORE: 38
ADLS_ACUITY_SCORE: 35
ADLS_ACUITY_SCORE: 34
ADLS_ACUITY_SCORE: 35
ADLS_ACUITY_SCORE: 38
ADLS_ACUITY_SCORE: 35

## 2024-05-19 NOTE — PLAN OF CARE
Goal Outcome Evaluation:       I Illness Severity: Stable    PPatient Summary:      Telemetry Orders: Yes    Interpretation of Cardiac Rhythm: SR    Dyspnea improved and O2 sats >88% at RA or at prior home O2 therapy level:  No    Last Bowel Movement: Prior to admission    Acute Symptoms or Concerns: none    Is this a new or worsening symptom or concern? No    Is this symptom or concern being adequately managed?  N/a    Shift Summary: Pt slept well overnight. BP dropped x1  500cc NS bolus given and pt responded well.  Other VS stable.  Pt remains on 2L NC.  No c/o pain overnight.      AActions:    Diagnostic testing and/or procedures completed, and results are available for discharge:  Met    SSituational Awareness:      Anticipated post discharge treatment plan formulated and the following needs have been identified:   None identified    SSynthesis:     Was bedside rounding completed on your shift? No     What team members present during bedside rounds?  RN          Overall Patient Progress: improvingOverall Patient Progress: improving

## 2024-05-19 NOTE — SIGNIFICANT EVENT
Blood pressures dropped with MAP less than 60.  MD Tamayo asked to assess pt bedside.  Fluid bolus ordered to be given over 2 hours.  MAP recovered to 65 before bolus started.  Will continue to monitor over the next 2 hours and update MD with any significant changes.

## 2024-05-19 NOTE — PROGRESS NOTES
[facility]    Progress Note - Hospitalist Service       Date of Admission:  5/16/2024    Assessment & Plan   Lance Ibrahim is a 79 year old male admitted on 5/16/2024. 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    He is admitted for generalized weakness after mechanical fall as well as O2 desaturation with activity. He progressed into severe sepsis secondary to Klebsiella bacteremia and UTI. Patient is improving and now on ceftriaxone guided by sensitivities of cultures.     Klebsiella oxytoca bacteremia  Klebsiella oxytoca UTI  Acute hypoxic respiratory failure  Severe sepsis secondary to bacteremia and UTI Klebsiella  Patient became hypotensive with blood pressures of 89/49 on 5/17.  Lactic acid was drawn 1.3 and a fluid bolus was given.  Started on Levophed for additional blood pressure support.  Urine culture positive for Klebsiella oxytoca and started on Levaquin before transition to vancomycin (one day 5/17) and cefepime (5/17-5/19).  Blood cultures drawn showing Klebsiella as well.  He had a PICC line placed and transferred to ICU.  Troponin downtrending.  Patient titrated off Levophed 5/18.  Initially required 8L of supplemental oxygen but was able to titrate down to 3 L this a.m.  This morning patient choked on a Tylenol.  Urine culture sensitivities with resistance to ampicillin.  Patient was downgraded from ICU status to [facility].  - Bedside swallow study  - Ceftriaxone (5/19- )  - Continue supplementation with oxygen as needed to keep sats >90%  - Repeat blood cultures and follow blood culture sensitivities  - AM  CBC, BMP    Mechanical Fall  Osteoarthritis  Chronic pain  DM neuropathy  Lives home alone, unwitnessed mechanical fall 3 days prior to admission, remained down for several days, unable to get up before son in law found him. Imaging in ER negative for acute process. Likely will need placement.  Patient has spinal stimulator, which she states he usually charges once a week.  PT/OT recommends TCU.  - Scheduled Tylenol, heat, ice, Voltaren gel  - Dilaudid 2 mg for severe pain PRN  - Lidocaine patch  - PTA gabapentin 300 mg TID PRN  - One dose Toradol 15mg to see if pain improves. If not, will place IP pain consult. If improved, will place PRN Toradol order.     Type 2 diabetes mellitus  A1c 8 two months ago.  - Hold PTA glipizide, Rybelsus. Consider discontinuation of glipizide on discharge due to hypoglycemia effects.  - Sliding scale insulin   - Glargine 12 units at bedtime    HFpEF  Echo 5/16/2024 with EF 50-55%, mild LV global hyperkinesia, pulmonary HTN.  Patient does not appear fluid overloaded on exam or chest x-ray.  BNP 2058.  - Holding PTA torsemide, Imdur     DEAN on CKD, resolved  Baseline Cr 1.5-1.6, 1.98 on admission in setting of being down for several days, decreased PO, suspect prerenal.  - Encourage PO   - AM BMP  - Hold PTA lisinopril      HTN  Soft blood pressures  - Hold PTA amlodipine, lisinopril      Biliary obstruction with malignant stricture s/p EUS/ERCP 4/30/24 with sphincterotomy and temporary stent placement   Pruritus  Elevated liver chemistries, improving  LFT's downtrending on admission.   - Follow up outpatient with [facility] as recommended  - PTA Ursodiol 300 mg BID    Hypomagnesemia  Hypokalemia  - Magnesium protocol  - Potassium protocol    Incidental pulmonary nodule  Seen on imaging last admission.   - Follow up outpatient as recommended      Gout  - Hold PTA colchicine        Diet: Combination Diet Regular Diet Adult; Moderate Consistent Carb (60 g CHO per Meal) Diet    DVT Prophylaxis:  Heparin  Barnes Catheter: Not present  Fluids: PO  Lines: PRESENT      PICC 05/17/24 Triple Lumen Right Basilic-Site Assessment: WDL    Cardiac Monitoring: ACTIVE order. Indication: Tachyarrhythmias, acute (48 hours)  Code Status: Full Code      Clinically Significant Risk Factors     # Hypokalemia: Lowest K = 2.9 mmol/L in last 2 days, will replace as needed       # Hypoalbuminemia: Lowest albumin = 2.2 g/dL at 5/18/2024  3:18 AM, will monitor as appropriate   # Thrombocytopenia: Lowest platelets = 121 in last 2 days, will monitor for bleeding   # Hypertension: Noted on problem list  # Chronic heart failure with preserved ejection fraction: heart failure noted on problem list and last echo with EF >50%      # DMII: A1C = 8.0 % (Ref range: 0.0 - 5.6 %) within past 6 months, PRESENT ON ADMISSION  # Overweight: Estimated body mass index is 28.49 kg/m  as calculated from the following:    Height as of this encounter: 1.829 m (6').    Weight as of this encounter: 95.3 kg (210 lb 1.6 oz)., PRESENT ON ADMISSION     # Financial/Environmental Concerns: none         Disposition Plan      Expected Discharge Date: 05/21/2024        Discharge Comments: PT/OT?.  IV ABX.  TCU at discharge        The patient's care was discussed with the Attending Physician, Dr. Adams .    Alma Mcdaniel MD PGY3  Hospitalist Service  [facility]  Securely message with [employer] (more info)  Text page via [facility] Paging/Directory   ______________________________________________________________________    Interval History   Patient states his back and hips are causing him excruciating pain. He has been repositioned many times, stimulator is on, and just received pain medication. He states that other than the pain, he is doing well.     Physical Exam   Vital Signs: Temp: 98.4  F (36.9  C) Temp src: Oral BP: 119/56 Pulse: 96   Resp: 24 SpO2: 97 % O2 Device: Nasal cannula Oxygen Delivery: 2 LPM  Weight: 210 lbs 1.6 oz    GENERAL:  alert and in mild distress  Ears: hearing grossly intact  Eyes: sclera clear, EOMs intact  RESP: lungs clear to auscultation - no rales, rhonchi or wheezes  CV: Regular rate and rhythm, normal S1 S2, no murmur appreciated  ABDOMEN: soft, nontender, and bowel sounds normal  PSYCH: mentation appears normal, affect normal/bright  EXTREMITIES: Mild lower extremity pitting edema    Data     I have personally reviewed the following data over the past 24 hrs:    12.0 (H)  \   8.4 (L)   / 121 (L)     140 108 (H) 47.2 (H) /  241 (H)   3.2 (L) 22 1.62 (H) \       Imaging results reviewed over the past 24 hrs:   No results found for this or any previous visit (from the past 24 hour(s)).

## 2024-05-19 NOTE — PLAN OF CARE
Goal Outcome Evaluation:      Problem: Adult Inpatient Plan of Care  Goal: Optimal Comfort and Wellbeing  Outcome: Progressing     Problem: Pain Chronic (Persistent)  Goal: Optimal Pain Control and Function  Outcome: Progressing     Problem: Sepsis/Septic Shock  Goal: Absence of Infection Signs and Symptoms  Outcome: Progressing  Patient is up with an assist of 2 and a walker and gait belt. Worked with therapy today. Tolerating activity fairly well at this time. Purewick in place - patent and intact. Chronic pain managed with scheduled pain medication, rest, and repositioning. Repositioned throughout day to help promote skin integrity. IV abx. Call light placed within reach and purposeful rounding performed. Lin Katz RN

## 2024-05-20 ENCOUNTER — APPOINTMENT (OUTPATIENT)
Dept: PHYSICAL THERAPY | Facility: CLINIC | Age: 79
DRG: 871 | End: 2024-05-20
Payer: COMMERCIAL

## 2024-05-20 LAB
ANION GAP SERPL CALCULATED.3IONS-SCNC: 9 MMOL/L (ref 7–15)
BACTERIA BLD CULT: ABNORMAL
BACTERIA BLD CULT: ABNORMAL
BUN SERPL-MCNC: 37.5 MG/DL (ref 8–23)
CALCIUM SERPL-MCNC: 8.5 MG/DL (ref 8.8–10.2)
CHLORIDE SERPL-SCNC: 108 MMOL/L (ref 98–107)
CREAT SERPL-MCNC: 1.38 MG/DL (ref 0.67–1.17)
DEPRECATED HCO3 PLAS-SCNC: 22 MMOL/L (ref 22–29)
EGFRCR SERPLBLD CKD-EPI 2021: 52 ML/MIN/1.73M2
ERYTHROCYTE [DISTWIDTH] IN BLOOD BY AUTOMATED COUNT: 17.9 % (ref 10–15)
GLUCOSE BLDC GLUCOMTR-MCNC: 154 MG/DL (ref 70–99)
GLUCOSE BLDC GLUCOMTR-MCNC: 162 MG/DL (ref 70–99)
GLUCOSE BLDC GLUCOMTR-MCNC: 179 MG/DL (ref 70–99)
GLUCOSE BLDC GLUCOMTR-MCNC: 212 MG/DL (ref 70–99)
GLUCOSE SERPL-MCNC: 183 MG/DL (ref 70–99)
HCT VFR BLD AUTO: 27.5 % (ref 40–53)
HGB BLD-MCNC: 8.7 G/DL (ref 13.3–17.7)
MAGNESIUM SERPL-MCNC: 1.9 MG/DL (ref 1.7–2.3)
MCH RBC QN AUTO: 28.2 PG (ref 26.5–33)
MCHC RBC AUTO-ENTMCNC: 31.6 G/DL (ref 31.5–36.5)
MCV RBC AUTO: 89 FL (ref 78–100)
PLATELET # BLD AUTO: 148 10E3/UL (ref 150–450)
POTASSIUM SERPL-SCNC: 3.6 MMOL/L (ref 3.4–5.3)
RBC # BLD AUTO: 3.08 10E6/UL (ref 4.4–5.9)
SODIUM SERPL-SCNC: 139 MMOL/L (ref 135–145)
WBC # BLD AUTO: 13.8 10E3/UL (ref 4–11)

## 2024-05-20 PROCEDURE — 250N000011 HC RX IP 250 OP 636: Performed by: STUDENT IN AN ORGANIZED HEALTH CARE EDUCATION/TRAINING PROGRAM

## 2024-05-20 PROCEDURE — 83735 ASSAY OF MAGNESIUM: CPT | Performed by: STUDENT IN AN ORGANIZED HEALTH CARE EDUCATION/TRAINING PROGRAM

## 2024-05-20 PROCEDURE — 250N000013 HC RX MED GY IP 250 OP 250 PS 637

## 2024-05-20 PROCEDURE — 99232 SBSQ HOSP IP/OBS MODERATE 35: CPT | Mod: GC

## 2024-05-20 PROCEDURE — 250N000011 HC RX IP 250 OP 636

## 2024-05-20 PROCEDURE — 97110 THERAPEUTIC EXERCISES: CPT | Mod: GP

## 2024-05-20 PROCEDURE — 97116 GAIT TRAINING THERAPY: CPT | Mod: GP

## 2024-05-20 PROCEDURE — 120N000004 HC R&B MS OVERFLOW

## 2024-05-20 PROCEDURE — 85027 COMPLETE CBC AUTOMATED: CPT

## 2024-05-20 PROCEDURE — 80048 BASIC METABOLIC PNL TOTAL CA: CPT

## 2024-05-20 RX ORDER — SODIUM CHLORIDE, SODIUM LACTATE, POTASSIUM CHLORIDE, CALCIUM CHLORIDE 600; 310; 30; 20 MG/100ML; MG/100ML; MG/100ML; MG/100ML
INJECTION, SOLUTION INTRAVENOUS CONTINUOUS
Status: CANCELLED | OUTPATIENT
Start: 2024-05-20

## 2024-05-20 RX ORDER — HYDROMORPHONE HCL IN WATER/PF 6 MG/30 ML
0.4 PATIENT CONTROLLED ANALGESIA SYRINGE INTRAVENOUS EVERY 5 MIN PRN
Status: CANCELLED | OUTPATIENT
Start: 2024-05-20

## 2024-05-20 RX ORDER — ONDANSETRON 4 MG/1
4 TABLET, ORALLY DISINTEGRATING ORAL EVERY 30 MIN PRN
Status: CANCELLED | OUTPATIENT
Start: 2024-05-20

## 2024-05-20 RX ORDER — HYDROMORPHONE HCL IN WATER/PF 6 MG/30 ML
0.2 PATIENT CONTROLLED ANALGESIA SYRINGE INTRAVENOUS EVERY 5 MIN PRN
Status: CANCELLED | OUTPATIENT
Start: 2024-05-20

## 2024-05-20 RX ORDER — FENTANYL CITRATE 50 UG/ML
100 INJECTION, SOLUTION INTRAMUSCULAR; INTRAVENOUS
Status: CANCELLED | OUTPATIENT
Start: 2024-05-20

## 2024-05-20 RX ORDER — ENOXAPARIN SODIUM 100 MG/ML
40 INJECTION SUBCUTANEOUS EVERY 24 HOURS
Status: DISCONTINUED | OUTPATIENT
Start: 2024-05-20 | End: 2024-05-22 | Stop reason: HOSPADM

## 2024-05-20 RX ORDER — DEXAMETHASONE SODIUM PHOSPHATE 10 MG/ML
4 INJECTION, SOLUTION INTRAMUSCULAR; INTRAVENOUS
Status: CANCELLED | OUTPATIENT
Start: 2024-05-20

## 2024-05-20 RX ORDER — FENTANYL CITRATE 50 UG/ML
50 INJECTION, SOLUTION INTRAMUSCULAR; INTRAVENOUS EVERY 5 MIN PRN
Status: CANCELLED | OUTPATIENT
Start: 2024-05-20

## 2024-05-20 RX ORDER — NALOXONE HYDROCHLORIDE 0.4 MG/ML
0.1 INJECTION, SOLUTION INTRAMUSCULAR; INTRAVENOUS; SUBCUTANEOUS
Status: CANCELLED | OUTPATIENT
Start: 2024-05-20

## 2024-05-20 RX ORDER — FENTANYL CITRATE 50 UG/ML
25 INJECTION, SOLUTION INTRAMUSCULAR; INTRAVENOUS EVERY 5 MIN PRN
Status: CANCELLED | OUTPATIENT
Start: 2024-05-20

## 2024-05-20 RX ORDER — ONDANSETRON 2 MG/ML
4 INJECTION INTRAMUSCULAR; INTRAVENOUS EVERY 30 MIN PRN
Status: CANCELLED | OUTPATIENT
Start: 2024-05-20

## 2024-05-20 RX ORDER — LIDOCAINE 40 MG/G
CREAM TOPICAL
Status: CANCELLED | OUTPATIENT
Start: 2024-05-20

## 2024-05-20 RX ADMIN — SODIUM BICARBONATE 650 MG TABLET 1950 MG: at 13:00

## 2024-05-20 RX ADMIN — KETOROLAC TROMETHAMINE 15 MG: 15 INJECTION, SOLUTION INTRAMUSCULAR; INTRAVENOUS at 06:49

## 2024-05-20 RX ADMIN — ACETAMINOPHEN 975 MG: 325 TABLET ORAL at 19:42

## 2024-05-20 RX ADMIN — DICLOFENAC 2 G: 10 GEL TOPICAL at 15:35

## 2024-05-20 RX ADMIN — KETOROLAC TROMETHAMINE 15 MG: 15 INJECTION, SOLUTION INTRAMUSCULAR; INTRAVENOUS at 17:09

## 2024-05-20 RX ADMIN — URSOSIOL 300 MG: 300 CAPSULE ORAL at 19:42

## 2024-05-20 RX ADMIN — DICLOFENAC 2 G: 10 GEL TOPICAL at 19:44

## 2024-05-20 RX ADMIN — HYDROMORPHONE HYDROCHLORIDE 0.2 MG: 0.2 INJECTION, SOLUTION INTRAMUSCULAR; INTRAVENOUS; SUBCUTANEOUS at 12:57

## 2024-05-20 RX ADMIN — DICLOFENAC 2 G: 10 GEL TOPICAL at 12:07

## 2024-05-20 RX ADMIN — HYDROMORPHONE HYDROCHLORIDE 2 MG: 2 TABLET ORAL at 12:11

## 2024-05-20 RX ADMIN — HYDROMORPHONE HYDROCHLORIDE 2 MG: 2 TABLET ORAL at 17:25

## 2024-05-20 RX ADMIN — ACETAMINOPHEN 975 MG: 325 TABLET ORAL at 13:00

## 2024-05-20 RX ADMIN — CEFTRIAXONE SODIUM 2 G: 2 INJECTION, POWDER, FOR SOLUTION INTRAMUSCULAR; INTRAVENOUS at 17:15

## 2024-05-20 RX ADMIN — HYDROMORPHONE HYDROCHLORIDE 2 MG: 2 TABLET ORAL at 03:35

## 2024-05-20 RX ADMIN — HEPARIN SODIUM 5000 UNITS: 5000 INJECTION, SOLUTION INTRAVENOUS; SUBCUTANEOUS at 05:43

## 2024-05-20 RX ADMIN — Medication 1 TABLET: at 07:26

## 2024-05-20 RX ADMIN — HYDROMORPHONE HYDROCHLORIDE 2 MG: 2 TABLET ORAL at 06:49

## 2024-05-20 RX ADMIN — ASPIRIN 81 MG: 81 TABLET, COATED ORAL at 07:26

## 2024-05-20 RX ADMIN — SODIUM BICARBONATE 650 MG TABLET 1950 MG: at 07:26

## 2024-05-20 RX ADMIN — INSULIN ASPART 1 UNITS: 100 INJECTION, SOLUTION INTRAVENOUS; SUBCUTANEOUS at 15:48

## 2024-05-20 RX ADMIN — URSOSIOL 300 MG: 300 CAPSULE ORAL at 07:26

## 2024-05-20 RX ADMIN — INSULIN ASPART 1 UNITS: 100 INJECTION, SOLUTION INTRAVENOUS; SUBCUTANEOUS at 12:06

## 2024-05-20 RX ADMIN — DICLOFENAC 2 G: 10 GEL TOPICAL at 07:34

## 2024-05-20 RX ADMIN — ENOXAPARIN SODIUM 40 MG: 100 INJECTION SUBCUTANEOUS at 13:04

## 2024-05-20 RX ADMIN — HYDROMORPHONE HYDROCHLORIDE 2 MG: 2 TABLET ORAL at 22:27

## 2024-05-20 RX ADMIN — HYDROXYZINE HYDROCHLORIDE 25 MG: 25 TABLET ORAL at 19:43

## 2024-05-20 RX ADMIN — ACETAMINOPHEN 975 MG: 325 TABLET ORAL at 07:26

## 2024-05-20 RX ADMIN — INSULIN ASPART 1 UNITS: 100 INJECTION, SOLUTION INTRAVENOUS; SUBCUTANEOUS at 07:27

## 2024-05-20 RX ADMIN — SODIUM BICARBONATE 650 MG TABLET 1950 MG: at 19:43

## 2024-05-20 ASSESSMENT — ACTIVITIES OF DAILY LIVING (ADL)
ADLS_ACUITY_SCORE: 35
ADLS_ACUITY_SCORE: 36
ADLS_ACUITY_SCORE: 37
ADLS_ACUITY_SCORE: 36
ADLS_ACUITY_SCORE: 36
ADLS_ACUITY_SCORE: 34
ADLS_ACUITY_SCORE: 36
ADLS_ACUITY_SCORE: 35
ADLS_ACUITY_SCORE: 35
ADLS_ACUITY_SCORE: 37
ADLS_ACUITY_SCORE: 37
ADLS_ACUITY_SCORE: 35
ADLS_ACUITY_SCORE: 36
ADLS_ACUITY_SCORE: 35
ADLS_ACUITY_SCORE: 37
ADLS_ACUITY_SCORE: 35
ADLS_ACUITY_SCORE: 37
ADLS_ACUITY_SCORE: 37
ADLS_ACUITY_SCORE: 35
ADLS_ACUITY_SCORE: 36
ADLS_ACUITY_SCORE: 37

## 2024-05-20 NOTE — PROGRESS NOTES
Care Management Follow Up    Length of Stay (days): 4    Expected Discharge Date: 05/22/2024     Concerns to be Addressed: discharge planning     Patient plan of care discussed at interdisciplinary rounds: Yes    Anticipated Discharge Disposition: Transitional Care     Anticipated Discharge Services: Transportation Services  Anticipated Discharge DME: None    Patient/family educated on Medicare website which has current facility and service quality ratings: yes  Education Provided on the Discharge Plan: Yes (AVS per bedside RN)  Patient/Family in Agreement with the Plan: yes    Referrals Placed by CM/SW: Post Acute Facilities  Private pay costs discussed: Not applicable    Additional Information:  Pt was accepted at [facility], they can accept him 5/22. PAS was completed [id number]. Pt is aware and agreeable to go.          Lucas Courtney RN

## 2024-05-20 NOTE — PLAN OF CARE
Problem: Pain Chronic (Persistent)  Goal: Optimal Pain Control and Function  Outcome: Not Progressing  Intervention: Manage Persistent Pain    Problem: Comorbidity Management  Goal: Blood Glucose Levels Within Targeted Range  Outcome: Progressing  Intervention: Monitor and Manage Glycemia  Recent Flowsheet Documentation  Taken 5/20/2024 0330 by Kacy Cruz RN  Medication Review/Management: medications reviewed  Taken 5/19/2024 2000 by Kacy Cruz RN  Medication Review/Management: medications reviewed     Problem: Gas Exchange Impaired  Goal: Optimal Gas Exchange  Outcome: Progressing  Intervention: Optimize Oxygenation and Ventilation  Recent Flowsheet Documentation  Taken 5/19/2024 2000 by Kacy Cruz RN  Head of Bed (HOB) Positioning: HOB at 20-30 degrees     Problem: Sepsis/Septic Shock  Goal: Blood Glucose Level Within Targeted Range  Outcome: Progressing  Goal: Absence of Infection Signs and Symptoms  Outcome: Progressing  Intervention: Promote Recovery  Recent Flowsheet Documentation  Taken 5/20/2024 0000 by Kacy Cruz RN  Activity Management: patient refuses activity  Taken 5/19/2024 2000 by Kacy Cruz RN  Activity Management: activity adjusted per tolerance      Goal Outcome Evaluation:       Able to titrate pt down to 1L NC, vitally stable otherwise. Frequently cries out with complaints of L knee pain. Able to get pain under control after robaxin & dilaudid, but requires around the clock dosing. Not OOB. Turns throughout night when pt agreeable to repositioning. States repositioning is severely painful. Tele discontinued. Urinal at bedside. No other acute changes on shift.

## 2024-05-20 NOTE — PROGRESS NOTES
[facility]    Progress Note - Hospitalist Service       Date of Admission:  5/16/2024    Assessment & Plan   Lance Ibrahim is a 79 year old male admitted on 5/16/2024. 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    He is admitted for generalized weakness after mechanical fall as well as O2 desaturation with activity. He progressed into severe sepsis secondary to Klebsiella bacteremia and UTI. Patient is improving and now on ceftriaxone guided by sensitivities of cultures.    Patient has been accepted to [facility] for 5/22.     Klebsiella oxytoca bacteremia  Klebsiella oxytoca UTI  Acute hypoxic respiratory failure  Severe sepsis secondary to bacteremia and UTI Klebsiella  Patient became hypotensive with blood pressures of 89/49 on 5/17.  Lactic acid was drawn 1.3 and a fluid bolus was given.  Started on Levophed for additional blood pressure support.  Urine culture positive for Klebsiella oxytoca and started on Levaquin before transition to vancomycin (one day 5/17) and cefepime (5/17-5/19).  Blood cultures drawn showing Klebsiella as well.  He had a PICC line placed and transferred to ICU.  Troponin downtrending.  Patient titrated off Levophed 5/18.  Initially required 8L of supplemental oxygen but was able to be put on room air this a.m. Urine culture sensitivities with resistance to ampicillin.  Patient was downgraded from ICU status 5/18. Blood cultures came back positive for klebsiella oxytoca 5/20. Clinically improving.  - Ceftriaxone (5/19- )  - Continue supplementation with oxygen as  needed to keep sats >90%  - Follow blood cultures  - AM CBC, BMP    Mechanical Fall  Osteoarthritis  Chronic pain  DM neuropathy  Lives home alone, unwitnessed mechanical fall 3 days prior to admission, remained down for several days, unable to get up before son in law found him. Imaging in ER negative for acute process.  Patient has spinal stimulator, which she states he usually charges once a week.  PT/OT recommends TCU.  - Scheduled Tylenol, heat, ice, Voltaren gel  - Toradol 15 mg q8 PRN  - Dilaudid for severe pain PRN  - Lidocaine patch  - PTA gabapentin 300 mg TID PRN    Type 2 diabetes mellitus  A1c 8 two months ago.  - Hold PTA glipizide, Rybelsus. Consider discontinuation of glipizide on discharge due to hypoglycemia effects.  - Sliding scale insulin   - Glargine 12 units at bedtime    HFpEF  Echo 5/16/2024 with EF 50-55%, mild LV global hyperkinesia, pulmonary HTN.  Patient does not appear fluid overloaded on exam or chest x-ray.  BNP 2058.  - Holding PTA torsemide, Imdur     DEAN on CKD, resolved  Baseline Cr 1.5-1.6, 1.98 on admission in setting of being down for several days, decreased PO, suspect prerenal.  - Encourage PO   - AM BMP  - Hold PTA lisinopril      HTN  Soft blood pressures  - Hold PTA amlodipine, lisinopril      Biliary obstruction with malignant stricture s/p EUS/ERCP 4/30/24 with sphincterotomy and temporary stent placement   Pruritus  Elevated liver chemistries, improving  LFT's downtrending on admission.   - Follow up outpatient with [facility] as recommended  - PTA Ursodiol 300 mg BID    Hypomagnesemia  Hypokalemia  - Magnesium protocol  - Potassium protocol    Incidental pulmonary nodule  Seen on imaging last admission.   - Follow up outpatient as recommended      Gout  - Hold PTA colchicine        Diet: Combination Diet Regular Diet Adult; Moderate Consistent Carb (60 g CHO per Meal) Diet    DVT Prophylaxis: Heparin -> changed to lovenox due to improved kidney function  Barnes Catheter: Not  present  Fluids: PO  Lines: PRESENT      PICC 05/17/24 Triple Lumen Right Basilic-Site Assessment: WDL    Cardiac Monitoring: None  Code Status: Full Code      Clinically Significant Risk Factors     # Hypokalemia: Lowest K = 3.2 mmol/L in last 2 days, will replace as needed       # Hypoalbuminemia: Lowest albumin = 2.2 g/dL at 5/18/2024  3:18 AM, will monitor as appropriate   # Thrombocytopenia: Lowest platelets = 121 in last 2 days, will monitor for bleeding   # Hypertension: Noted on problem list  # Chronic heart failure with preserved ejection fraction: heart failure noted on problem list and last echo with EF >50%      # DMII: A1C = 8.0 % (Ref range: 0.0 - 5.6 %) within past 6 months, PRESENT ON ADMISSION  # Overweight: Estimated body mass index is 28.63 kg/m  as calculated from the following:    Height as of this encounter: 1.829 m (6').    Weight as of this encounter: 95.8 kg (211 lb 1.6 oz)., PRESENT ON ADMISSION     # Financial/Environmental Concerns: none         Disposition Plan      Expected Discharge Date: 05/22/2024        Discharge Comments: PT/OT?.  IV ABX.  TCU at discharge        The patient's care was discussed with the Attending Physician, Dr. Campbell .    Thais Suarez MD PGY1  Hospitalist Service  [facility]  Securely message with [employer] (more info)  Text page via [facility] Paging/Directory   ______________________________________________________________________    Interval History   Has been able to titrate supplemental oxygen down to room air this morning.  Patient is feeling a lot better and states that pain is controlled with medications.  Not having any problems with urination or bowel movements. No questions or complaints. Discussed TCU referrals and patient was agreeable.    Physical Exam   Vital Signs: Temp: 97.9  F (36.6  C) Temp src: Oral BP: 135/67 Pulse: 94   Resp: 22 SpO2: 96 % O2 Device: Nasal cannula Oxygen Delivery: 1 LPM  Weight: 211 lbs 1.6  oz    GENERAL: alert and in mild distress oriented to person place and time  Ears: hearing grossly intact  Eyes: sclera clear, EOMs intact  RESP: lungs clear to auscultation - no rales, rhonchi or wheezes  CV: Regular rate and rhythm, normal S1 S2, no murmur appreciated  ABDOMEN: soft, nontender, and bowel sounds normal  PSYCH: mentation appears normal, affect normal/bright  EXTREMITIES: Mild lower extremity pitting edema    Data     I have personally reviewed the following data over the past 24 hrs:    13.8 (H)  \   8.7 (L)   / 148 (L)     139 108 (H) 37.5 (H) /  162 (H)   3.6 22 1.38 (H) \       Imaging results reviewed over the past 24 hrs:   No results found for this or any previous visit (from the past 24 hour(s)).

## 2024-05-20 NOTE — PLAN OF CARE
Problem: Pain Chronic (Persistent)  Goal: Optimal Pain Control and Function  Outcome: Progressing     Problem: Sepsis/Septic Shock  Goal: Optimal Coping  Outcome: Progressing    Pt resting between cares, pain control has been biggest issue this shift. He c/o nonstop pain to L knee partially relieved w/ analgesics. Worked w/ therapy today, assist of 1 w/ walker & belt OOB. Urinal at bedside. Mepilex in place on pt's buttock. C/o constipation, declined miralax or senna at this time. Discharge to TCU in upcoming days.

## 2024-05-21 ENCOUNTER — APPOINTMENT (OUTPATIENT)
Dept: RADIOLOGY | Facility: CLINIC | Age: 79
DRG: 871 | End: 2024-05-21
Payer: COMMERCIAL

## 2024-05-21 ENCOUNTER — APPOINTMENT (OUTPATIENT)
Dept: PHYSICAL THERAPY | Facility: CLINIC | Age: 79
DRG: 871 | End: 2024-05-21
Payer: COMMERCIAL

## 2024-05-21 LAB
ANION GAP SERPL CALCULATED.3IONS-SCNC: 14 MMOL/L (ref 7–15)
BUN SERPL-MCNC: 30.9 MG/DL (ref 8–23)
CALCIUM SERPL-MCNC: 8.5 MG/DL (ref 8.8–10.2)
CHLORIDE SERPL-SCNC: 105 MMOL/L (ref 98–107)
CREAT SERPL-MCNC: 1.27 MG/DL (ref 0.67–1.17)
DEPRECATED HCO3 PLAS-SCNC: 21 MMOL/L (ref 22–29)
EGFRCR SERPLBLD CKD-EPI 2021: 57 ML/MIN/1.73M2
ERYTHROCYTE [DISTWIDTH] IN BLOOD BY AUTOMATED COUNT: 17.7 % (ref 10–15)
GLUCOSE BLDC GLUCOMTR-MCNC: 149 MG/DL (ref 70–99)
GLUCOSE BLDC GLUCOMTR-MCNC: 177 MG/DL (ref 70–99)
GLUCOSE BLDC GLUCOMTR-MCNC: 197 MG/DL (ref 70–99)
GLUCOSE BLDC GLUCOMTR-MCNC: 266 MG/DL (ref 70–99)
GLUCOSE SERPL-MCNC: 185 MG/DL (ref 70–99)
HCT VFR BLD AUTO: 27.4 % (ref 40–53)
HGB BLD-MCNC: 8.8 G/DL (ref 13.3–17.7)
MAGNESIUM SERPL-MCNC: 1.8 MG/DL (ref 1.7–2.3)
MCH RBC QN AUTO: 28.2 PG (ref 26.5–33)
MCHC RBC AUTO-ENTMCNC: 32.1 G/DL (ref 31.5–36.5)
MCV RBC AUTO: 88 FL (ref 78–100)
PLATELET # BLD AUTO: 170 10E3/UL (ref 150–450)
POTASSIUM SERPL-SCNC: 3.4 MMOL/L (ref 3.4–5.3)
POTASSIUM SERPL-SCNC: 3.8 MMOL/L (ref 3.4–5.3)
RBC # BLD AUTO: 3.12 10E6/UL (ref 4.4–5.9)
SODIUM SERPL-SCNC: 140 MMOL/L (ref 135–145)
WBC # BLD AUTO: 14.4 10E3/UL (ref 4–11)

## 2024-05-21 PROCEDURE — 84132 ASSAY OF SERUM POTASSIUM: CPT | Performed by: FAMILY MEDICINE

## 2024-05-21 PROCEDURE — 120N000004 HC R&B MS OVERFLOW

## 2024-05-21 PROCEDURE — 250N000013 HC RX MED GY IP 250 OP 250 PS 637: Performed by: FAMILY MEDICINE

## 2024-05-21 PROCEDURE — 83735 ASSAY OF MAGNESIUM: CPT | Performed by: STUDENT IN AN ORGANIZED HEALTH CARE EDUCATION/TRAINING PROGRAM

## 2024-05-21 PROCEDURE — 80048 BASIC METABOLIC PNL TOTAL CA: CPT

## 2024-05-21 PROCEDURE — 250N000013 HC RX MED GY IP 250 OP 250 PS 637

## 2024-05-21 PROCEDURE — 250N000011 HC RX IP 250 OP 636

## 2024-05-21 PROCEDURE — 97530 THERAPEUTIC ACTIVITIES: CPT | Mod: GP

## 2024-05-21 PROCEDURE — 85027 COMPLETE CBC AUTOMATED: CPT

## 2024-05-21 PROCEDURE — 99232 SBSQ HOSP IP/OBS MODERATE 35: CPT | Mod: GC

## 2024-05-21 PROCEDURE — 97116 GAIT TRAINING THERAPY: CPT | Mod: GP

## 2024-05-21 PROCEDURE — 73562 X-RAY EXAM OF KNEE 3: CPT | Mod: LT

## 2024-05-21 RX ORDER — POTASSIUM CHLORIDE 1500 MG/1
40 TABLET, EXTENDED RELEASE ORAL ONCE
Status: COMPLETED | OUTPATIENT
Start: 2024-05-21 | End: 2024-05-21

## 2024-05-21 RX ORDER — NORTRIPTYLINE HCL 10 MG
20 CAPSULE ORAL
Status: DISCONTINUED | OUTPATIENT
Start: 2024-05-21 | End: 2024-05-22 | Stop reason: HOSPADM

## 2024-05-21 RX ORDER — LEVOFLOXACIN 750 MG/1
750 TABLET, FILM COATED ORAL DAILY
Status: DISCONTINUED | OUTPATIENT
Start: 2024-05-21 | End: 2024-05-22 | Stop reason: HOSPADM

## 2024-05-21 RX ORDER — LISINOPRIL 10 MG/1
10 TABLET ORAL EVERY 24 HOURS
Status: DISCONTINUED | OUTPATIENT
Start: 2024-05-21 | End: 2024-05-22 | Stop reason: HOSPADM

## 2024-05-21 RX ORDER — CEFDINIR 300 MG/1
300 CAPSULE ORAL 2 TIMES DAILY
Status: DISCONTINUED | OUTPATIENT
Start: 2024-05-21 | End: 2024-05-21

## 2024-05-21 RX ORDER — LANOLIN ALCOHOL/MO/W.PET/CERES
3 CREAM (GRAM) TOPICAL
Status: DISCONTINUED | OUTPATIENT
Start: 2024-05-21 | End: 2024-05-22 | Stop reason: HOSPADM

## 2024-05-21 RX ORDER — TORSEMIDE 20 MG/1
20 TABLET ORAL DAILY
Status: DISCONTINUED | OUTPATIENT
Start: 2024-05-21 | End: 2024-05-22 | Stop reason: HOSPADM

## 2024-05-21 RX ORDER — OXYCODONE HYDROCHLORIDE 5 MG/1
5 TABLET ORAL
Status: DISCONTINUED | OUTPATIENT
Start: 2024-05-21 | End: 2024-05-22 | Stop reason: HOSPADM

## 2024-05-21 RX ORDER — IBUPROFEN 400 MG/1
400 TABLET, FILM COATED ORAL EVERY 6 HOURS
Status: DISCONTINUED | OUTPATIENT
Start: 2024-05-21 | End: 2024-05-22 | Stop reason: HOSPADM

## 2024-05-21 RX ORDER — POLYETHYLENE GLYCOL 3350 17 G/17G
17 POWDER, FOR SOLUTION ORAL DAILY
Status: DISCONTINUED | OUTPATIENT
Start: 2024-05-21 | End: 2024-05-22 | Stop reason: HOSPADM

## 2024-05-21 RX ADMIN — Medication 1 TABLET: at 08:59

## 2024-05-21 RX ADMIN — SODIUM BICARBONATE 650 MG TABLET 1950 MG: at 09:00

## 2024-05-21 RX ADMIN — ACETAMINOPHEN 975 MG: 325 TABLET ORAL at 21:56

## 2024-05-21 RX ADMIN — INSULIN ASPART 3 UNITS: 100 INJECTION, SOLUTION INTRAVENOUS; SUBCUTANEOUS at 12:46

## 2024-05-21 RX ADMIN — LIDOCAINE 1 PATCH: 4 PATCH TOPICAL at 09:14

## 2024-05-21 RX ADMIN — URSOSIOL 300 MG: 300 CAPSULE ORAL at 22:23

## 2024-05-21 RX ADMIN — ACETAMINOPHEN 975 MG: 325 TABLET ORAL at 08:57

## 2024-05-21 RX ADMIN — IBUPROFEN 400 MG: 400 TABLET ORAL at 12:57

## 2024-05-21 RX ADMIN — TORSEMIDE 20 MG: 20 TABLET ORAL at 12:57

## 2024-05-21 RX ADMIN — ASPIRIN 81 MG: 81 TABLET, COATED ORAL at 09:00

## 2024-05-21 RX ADMIN — URSOSIOL 300 MG: 300 CAPSULE ORAL at 08:58

## 2024-05-21 RX ADMIN — DICLOFENAC 2 G: 10 GEL TOPICAL at 08:51

## 2024-05-21 RX ADMIN — SODIUM BICARBONATE 650 MG TABLET 1950 MG: at 21:56

## 2024-05-21 RX ADMIN — METHOCARBAMOL 500 MG: 500 TABLET ORAL at 19:02

## 2024-05-21 RX ADMIN — OXYCODONE HYDROCHLORIDE 5 MG: 5 TABLET ORAL at 18:28

## 2024-05-21 RX ADMIN — POTASSIUM CHLORIDE 40 MEQ: 1500 TABLET, EXTENDED RELEASE ORAL at 08:57

## 2024-05-21 RX ADMIN — HYDROMORPHONE HYDROCHLORIDE 2 MG: 2 TABLET ORAL at 04:58

## 2024-05-21 RX ADMIN — ENOXAPARIN SODIUM 40 MG: 100 INJECTION SUBCUTANEOUS at 15:03

## 2024-05-21 RX ADMIN — LEVOFLOXACIN 750 MG: 750 TABLET, FILM COATED ORAL at 15:02

## 2024-05-21 RX ADMIN — DICLOFENAC 2 G: 10 GEL TOPICAL at 21:57

## 2024-05-21 RX ADMIN — SODIUM BICARBONATE 650 MG TABLET 1950 MG: at 15:02

## 2024-05-21 RX ADMIN — INSULIN ASPART 2 UNITS: 100 INJECTION, SOLUTION INTRAVENOUS; SUBCUTANEOUS at 08:55

## 2024-05-21 RX ADMIN — LISINOPRIL 10 MG: 10 TABLET ORAL at 12:51

## 2024-05-21 RX ADMIN — DICLOFENAC 2 G: 10 GEL TOPICAL at 18:02

## 2024-05-21 RX ADMIN — IBUPROFEN 400 MG: 400 TABLET ORAL at 18:01

## 2024-05-21 RX ADMIN — POLYETHYLENE GLYCOL 3350 17 G: 17 POWDER, FOR SOLUTION ORAL at 09:14

## 2024-05-21 RX ADMIN — ACETAMINOPHEN 975 MG: 325 TABLET ORAL at 15:01

## 2024-05-21 ASSESSMENT — ACTIVITIES OF DAILY LIVING (ADL)
ADLS_ACUITY_SCORE: 37
ADLS_ACUITY_SCORE: 36
ADLS_ACUITY_SCORE: 37
ADLS_ACUITY_SCORE: 36
ADLS_ACUITY_SCORE: 37
ADLS_ACUITY_SCORE: 37
ADLS_ACUITY_SCORE: 36
ADLS_ACUITY_SCORE: 37
ADLS_ACUITY_SCORE: 36
ADLS_ACUITY_SCORE: 36
ADLS_ACUITY_SCORE: 37
ADLS_ACUITY_SCORE: 37
ADLS_ACUITY_SCORE: 36

## 2024-05-21 NOTE — PROGRESS NOTES
[facility]    Progress Note - Hospitalist Service       Date of Admission:  5/16/2024    Assessment & Plan   Lance Ibrahim is a 79 year old male admitted on 5/16/2024. 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    He is admitted for generalized weakness after mechanical fall as well as O2 desaturation with activity. He progressed into severe sepsis secondary to Klebsiella bacteremia and UTI. Patient is improving and now on ceftriaxone guided by sensitivities of cultures.    Patient has been accepted to [facility] for 5/22.     Klebsiella oxytoca bacteremia  Klebsiella oxytoca UTI  Acute hypoxic respiratory failure  Severe sepsis secondary to bacteremia and UTI Klebsiella  Patient became hypotensive with blood pressures of 89/49 on 5/17.  Lactic acid was drawn 1.3 and a fluid bolus was given.  Started on Levophed for additional blood pressure support.  Urine culture positive for Klebsiella oxytoca and started on Levaquin before transition to vancomycin (one day 5/17) and cefepime (5/17-5/19).  Blood cultures drawn showing Klebsiella as well.  He had a PICC line placed and transferred to ICU.  Troponin downtrending.  Patient titrated off Levophed 5/18.  Initially required 8L of supplemental oxygen but was able to be put on room air. Urine culture sensitivities with resistance to ampicillin.  Patient was downgraded from ICU status 5/18. Blood cultures came back positive for klebsiella oxytoca 5/20, repeat cultures negative. Clinically improving.  - Discontinued Ceftriaxone (5/19-5/21)  - Started  levofloxacin (5/21-) for 10 days  - Continue supplementation with oxygen as needed to keep sats >90%  - Follow blood cultures  - AM CBC, BMP    Mechanical Fall  Osteoarthritis  Chronic pain  DM neuropathy  Lives home alone, unwitnessed mechanical fall 3 days prior to admission, remained down for several days, unable to get up before son in law found him. Imaging in ER negative for acute process.  Patient has spinal stimulator, which she states he usually charges once a week.  PT/OT recommends TCU.  Today patient complaining of left knee pain.  - Scheduled Tylenol, heat, ice, Voltaren gel  - Scheduled ibuprofen  - Lidocaine patch  - PTA gabapentin 300 mg TID PRN  - Left knee x-ray    Type 2 diabetes mellitus  A1c 8 two months ago.  - Hold PTA glipizide, Rybelsus. Consider discontinuation of glipizide on discharge due to hypoglycemia effects.  - Sliding scale insulin   - Glargine 12 units at bedtime    HFpEF  Echo 5/16/2024 with EF 50-55%, mild LV global hyperkinesia, pulmonary HTN.  Patient does not appear fluid overloaded on exam or chest x-ray.  BNP 2058.  - Holding PTA Imdur  - Restarted PTA torsemide     DEAN on CKD, resolved  Baseline Cr 1.5-1.6, 1.98 on admission in setting of being down for several days, decreased PO, suspect prerenal.  - Encourage PO   - AM BMP  - Restarted PTA lisinopril      HTN  Soft blood pressures  - Hold PTA amlodipine, lisinopril      Biliary obstruction with malignant stricture s/p EUS/ERCP 4/30/24 with sphincterotomy and temporary stent placement   Pruritus  Elevated liver chemistries, improving  LFT's downtrending on admission.   - Follow up outpatient with [facility] as recommended  - PTA Ursodiol 300 mg BID    Hypomagnesemia  Hypokalemia  - Magnesium protocol  - Potassium protocol    Incidental pulmonary nodule  Seen on imaging last admission.   - Follow up outpatient as recommended      Gout  - Hold PTA colchicine        Diet: Combination Diet Regular Diet Adult; Moderate Consistent  Carb (60 g CHO per Meal) Diet    DVT Prophylaxis: Lovenox  Barnes Catheter: Not present  Fluids: PO  Lines: PRESENT      PICC 05/17/24 Triple Lumen Right Basilic-Site Assessment: WDL    Cardiac Monitoring: None  Code Status: Full Code      Clinically Significant Risk Factors           # Hypoalbuminemia: Lowest albumin = 2.2 g/dL at 5/18/2024  3:18 AM, will monitor as appropriate     # Hypertension: Noted on problem list  # Chronic heart failure with preserved ejection fraction: heart failure noted on problem list and last echo with EF >50%      # DMII: A1C = 8.0 % (Ref range: 0.0 - 5.6 %) within past 6 months   # Overweight: Estimated body mass index is 28.24 kg/m  as calculated from the following:    Height as of this encounter: 1.829 m (6').    Weight as of this encounter: 94.4 kg (208 lb 3.2 oz).      # Financial/Environmental Concerns: none         Disposition Plan     Expected Discharge Date: 05/22/2024        Discharge Comments: PT/OT?.  IV ABX.  TCU at discharge        The patient's care was discussed with the Attending Physician, Dr. Campbell .    Thais Suarez MD PGY1  Hospitalist Service  [facility]  Securely message with [employer] (more info)  Text page via [employer] Paging/Directory   ______________________________________________________________________    Interval History   Patient stated he is feeling great this morning with his pain well-controlled.  We discussed plans for TCU placement and the patient is excited to get out of the hospital. Denies urinary symptoms, pain or difficulty having bowel movements. Has not had a bowel movement since Sunday.     Physical Exam   Vital Signs: Temp: 98.8  F (37.1  C) Temp src: Oral BP: (!) 150/67 Pulse: 102   Resp: 16 SpO2: 93 % O2 Device: None (Room air)    Weight: 208 lbs 3.2 oz    GENERAL: alert and in no acute distress oriented to person place and time and situation  Ears: hearing grossly intact  Eyes: sclera clear, EOMs intact  RESP:  lungs clear to auscultation - no rales, rhonchi or wheezes  CV: Regular rate and rhythm, normal S1 S2, no murmur appreciated  ABDOMEN: soft, nontender, and bowel sounds normal  PSYCH: mentation appears normal, affect normal/bright  EXTREMITIES: Mild lower extremity pitting edema    Data     I have personally reviewed the following data over the past 24 hrs:    14.4 (H)  \   8.8 (L)   / 170     140 105 30.9 (H) /  266 (H)   3.8 21 (L) 1.27 (H) \       Imaging results reviewed over the past 24 hrs:   No results found for this or any previous visit (from the past 24 hour(s)).

## 2024-05-21 NOTE — PLAN OF CARE
Pt Aox4 but states feeling confused. Writer attempted to ask pt what he is confused on. Pt w/ disorganized thoughts and unable to directly answer. Repetitively and loudly moans out in pain. Redirected as able. Intermittently pt will describe no pain, while continuing to moan out. Other times pt rates pain 9/10 in left knee. Ice and PRN medications utilized around the clock. Intermittently incontinent of urine. Urinal within reach. Alarms utilized. Blood glucose 154/212. Call light within reach and purposeful rounding performed. Shannon Miller RN      Problem: Pain Chronic (Persistent)  Goal: Optimal Pain Control and Function  5/20/2024 2252 by Shannon Miller, RN  Outcome: Not Progressing     Problem: Adult Inpatient Plan of Care  Goal: Absence of Hospital-Acquired Illness or Injury  Intervention: Identify and Manage Fall Risk  Recent Flowsheet Documentation  Taken 5/20/2024 1942 by Shannon Miller, RN  Safety Promotion/Fall Prevention:   activity supervised   clutter free environment maintained   increased rounding and observation   increase visualization of patient   room near nurse's station   room organization consistent   safety round/check completed   supervised activity  Taken 5/20/2024 1537 by Shannon Miller, RN  Safety Promotion/Fall Prevention:   activity supervised   clutter free environment maintained   increased rounding and observation   increase visualization of patient   room near nurse's station   room organization consistent   safety round/check completed   supervised activity     Problem: Fall Injury Risk  Goal: Absence of Fall and Fall-Related Injury  5/20/2024 2252 by Shannon Miller, RN  Outcome: Progressing

## 2024-05-21 NOTE — PLAN OF CARE
Goal Outcome Evaluation:      Plan of Care Reviewed With: patient  Patient is alert and able to communicate his needs to staff. He is forgetful,but does respond to redirection. Patient c/o constipation and not having a bowel movement for 9 days. Miralax was administered and patient had 3 large bowel movements. Lidocaine patch applied to his lower mid back . Scheduled Tylenol administered for pain control.Patient will have an x ray of his left knee.Plans are for discharge to a TCU in [facility]. tomorrow

## 2024-05-22 ENCOUNTER — APPOINTMENT (OUTPATIENT)
Dept: PHYSICAL THERAPY | Facility: CLINIC | Age: 79
DRG: 871 | End: 2024-05-22
Payer: COMMERCIAL

## 2024-05-22 VITALS
TEMPERATURE: 98.3 F | RESPIRATION RATE: 18 BRPM | WEIGHT: 222.5 LBS | BODY MASS INDEX: 30.14 KG/M2 | SYSTOLIC BLOOD PRESSURE: 127 MMHG | HEIGHT: 72 IN | DIASTOLIC BLOOD PRESSURE: 60 MMHG | HEART RATE: 96 BPM | OXYGEN SATURATION: 91 %

## 2024-05-22 LAB
ANION GAP SERPL CALCULATED.3IONS-SCNC: 10 MMOL/L (ref 7–15)
BACTERIA BLD CULT: ABNORMAL
BACTERIA BLD CULT: ABNORMAL
BUN SERPL-MCNC: 31.9 MG/DL (ref 8–23)
CALCIUM SERPL-MCNC: 8.2 MG/DL (ref 8.8–10.2)
CHLORIDE SERPL-SCNC: 103 MMOL/L (ref 98–107)
CREAT SERPL-MCNC: 1.47 MG/DL (ref 0.67–1.17)
DEPRECATED HCO3 PLAS-SCNC: 26 MMOL/L (ref 22–29)
EGFRCR SERPLBLD CKD-EPI 2021: 48 ML/MIN/1.73M2
ERYTHROCYTE [DISTWIDTH] IN BLOOD BY AUTOMATED COUNT: 17.6 % (ref 10–15)
GLUCOSE BLDC GLUCOMTR-MCNC: 146 MG/DL (ref 70–99)
GLUCOSE BLDC GLUCOMTR-MCNC: 173 MG/DL (ref 70–99)
GLUCOSE SERPL-MCNC: 153 MG/DL (ref 70–99)
HCT VFR BLD AUTO: 25.9 % (ref 40–53)
HGB BLD-MCNC: 8.3 G/DL (ref 13.3–17.7)
MAGNESIUM SERPL-MCNC: 1.8 MG/DL (ref 1.7–2.3)
MCH RBC QN AUTO: 28.6 PG (ref 26.5–33)
MCHC RBC AUTO-ENTMCNC: 32 G/DL (ref 31.5–36.5)
MCV RBC AUTO: 89 FL (ref 78–100)
PLATELET # BLD AUTO: 189 10E3/UL (ref 150–450)
POTASSIUM SERPL-SCNC: 3.6 MMOL/L (ref 3.4–5.3)
RBC # BLD AUTO: 2.9 10E6/UL (ref 4.4–5.9)
SODIUM SERPL-SCNC: 139 MMOL/L (ref 135–145)
WBC # BLD AUTO: 11.8 10E3/UL (ref 4–11)

## 2024-05-22 PROCEDURE — 83735 ASSAY OF MAGNESIUM: CPT | Performed by: FAMILY MEDICINE

## 2024-05-22 PROCEDURE — 97530 THERAPEUTIC ACTIVITIES: CPT | Mod: GP

## 2024-05-22 PROCEDURE — 80048 BASIC METABOLIC PNL TOTAL CA: CPT

## 2024-05-22 PROCEDURE — 99238 HOSP IP/OBS DSCHRG MGMT 30/<: CPT | Mod: GC

## 2024-05-22 PROCEDURE — 250N000013 HC RX MED GY IP 250 OP 250 PS 637

## 2024-05-22 PROCEDURE — 85027 COMPLETE CBC AUTOMATED: CPT

## 2024-05-22 PROCEDURE — 97110 THERAPEUTIC EXERCISES: CPT | Mod: GP

## 2024-05-22 RX ORDER — LEVOFLOXACIN 750 MG/1
750 TABLET, FILM COATED ORAL DAILY
Qty: 9 TABLET | Refills: 0 | Status: SHIPPED | OUTPATIENT
Start: 2024-05-22 | End: 2024-05-31

## 2024-05-22 RX ORDER — OXYCODONE HYDROCHLORIDE 5 MG/1
5 TABLET ORAL
Refills: 0 | Status: CANCELLED | OUTPATIENT
Start: 2024-05-22

## 2024-05-22 RX ORDER — IBUPROFEN 400 MG/1
400 TABLET, FILM COATED ORAL EVERY 6 HOURS PRN
Qty: 30 TABLET | Refills: 0 | Status: ON HOLD | OUTPATIENT
Start: 2024-05-22 | End: 2024-06-24

## 2024-05-22 RX ORDER — METHOCARBAMOL 500 MG/1
500 TABLET, FILM COATED ORAL 4 TIMES DAILY PRN
Qty: 30 TABLET | Refills: 0 | Status: ON HOLD | OUTPATIENT
Start: 2024-05-22 | End: 2024-06-19

## 2024-05-22 RX ORDER — OXYCODONE HYDROCHLORIDE 5 MG/1
5 TABLET ORAL EVERY 6 HOURS PRN
Qty: 8 TABLET | Refills: 0 | Status: ON HOLD | OUTPATIENT
Start: 2024-05-22 | End: 2024-06-24

## 2024-05-22 RX ORDER — POLYETHYLENE GLYCOL 3350 17 G/17G
17 POWDER, FOR SOLUTION ORAL DAILY PRN
Qty: 225 G | Refills: 0 | Status: SHIPPED | OUTPATIENT
Start: 2024-05-22 | End: 2024-08-05

## 2024-05-22 RX ORDER — ACETAMINOPHEN 325 MG/1
975 TABLET ORAL 3 TIMES DAILY PRN
Qty: 30 TABLET | Refills: 0 | Status: ON HOLD | OUTPATIENT
Start: 2024-05-22 | End: 2024-06-24

## 2024-05-22 RX ADMIN — IBUPROFEN 400 MG: 400 TABLET ORAL at 02:11

## 2024-05-22 RX ADMIN — IBUPROFEN 400 MG: 400 TABLET ORAL at 05:47

## 2024-05-22 RX ADMIN — TORSEMIDE 20 MG: 20 TABLET ORAL at 08:23

## 2024-05-22 RX ADMIN — NORTRIPTYLINE HYDROCHLORIDE 20 MG: 10 CAPSULE ORAL at 02:11

## 2024-05-22 RX ADMIN — GABAPENTIN 300 MG: 300 CAPSULE ORAL at 02:11

## 2024-05-22 RX ADMIN — SODIUM BICARBONATE 650 MG TABLET 1950 MG: at 08:24

## 2024-05-22 RX ADMIN — LIDOCAINE 1 PATCH: 4 PATCH TOPICAL at 12:26

## 2024-05-22 RX ADMIN — Medication 1 TABLET: at 08:23

## 2024-05-22 RX ADMIN — URSOSIOL 300 MG: 300 CAPSULE ORAL at 08:24

## 2024-05-22 RX ADMIN — DICLOFENAC 2 G: 10 GEL TOPICAL at 12:21

## 2024-05-22 RX ADMIN — ACETAMINOPHEN 975 MG: 325 TABLET ORAL at 08:24

## 2024-05-22 RX ADMIN — DICLOFENAC 2 G: 10 GEL TOPICAL at 08:27

## 2024-05-22 RX ADMIN — METHOCARBAMOL 500 MG: 500 TABLET ORAL at 02:11

## 2024-05-22 RX ADMIN — INSULIN ASPART 1 UNITS: 100 INJECTION, SOLUTION INTRAVENOUS; SUBCUTANEOUS at 08:22

## 2024-05-22 RX ADMIN — LISINOPRIL 10 MG: 10 TABLET ORAL at 12:20

## 2024-05-22 RX ADMIN — ASPIRIN 81 MG: 81 TABLET, COATED ORAL at 08:23

## 2024-05-22 RX ADMIN — LEVOFLOXACIN 750 MG: 750 TABLET, FILM COATED ORAL at 08:23

## 2024-05-22 RX ADMIN — IBUPROFEN 400 MG: 400 TABLET ORAL at 12:20

## 2024-05-22 ASSESSMENT — ACTIVITIES OF DAILY LIVING (ADL)
ADLS_ACUITY_SCORE: 32
ADLS_ACUITY_SCORE: 41
ADLS_ACUITY_SCORE: 41
ADLS_ACUITY_SCORE: 32
ADLS_ACUITY_SCORE: 33
ADLS_ACUITY_SCORE: 32
ADLS_ACUITY_SCORE: 32
ADLS_ACUITY_SCORE: 41
ADLS_ACUITY_SCORE: 33
ADLS_ACUITY_SCORE: 41
ADLS_ACUITY_SCORE: 32
ADLS_ACUITY_SCORE: 36

## 2024-05-22 NOTE — PLAN OF CARE
Goal Outcome Evaluation:      Plan of Care Reviewed With: patient    Overall Patient Progress: improvingOverall Patient Progress: improving    Outcome Evaluation: Pt vitally stable throughout shift. On 1.5L NC while asleep to maintain O2 sats. Lung sounds clear but dim. Pt continues with urinary frequency, using urinal, and excreting around 100cc q2h. Pt given PRN gabapentin and robaxin for (R) hip pain. Given PRN pamelor for sleep with good effect. Mepilex on coccyx CDI. XR of (L) knee done last evening, see result.      Problem: Pain Chronic (Persistent)  Goal: Optimal Pain Control and Function  Intervention: Manage Persistent Pain  Recent Flowsheet Documentation  Taken 5/22/2024 0000 by Renetta Cross, RN  Sleep/Rest Enhancement:   awakenings minimized   medication   noise level reduced   regular sleep/rest pattern promoted   room darkened  Medication Review/Management: medications reviewed     Problem: Comorbidity Management  Goal: Blood Glucose Levels Within Targeted Range  Intervention: Monitor and Manage Glycemia  Recent Flowsheet Documentation  Taken 5/22/2024 0000 by Renetta Cross, RN  Glycemic Management: blood glucose monitored  Medication Review/Management: medications reviewed     Problem: Comorbidity Management  Goal: Maintenance of Heart Failure Symptom Control  Intervention: Maintain Heart Failure Management  Recent Flowsheet Documentation  Taken 5/22/2024 0000 by Renetta Cross, RN  Medication Review/Management: medications reviewed     Problem: Comorbidity Management  Goal: Blood Pressure in Desired Range  Intervention: Maintain Blood Pressure Management  Recent Flowsheet Documentation  Taken 5/22/2024 0000 by Renetta Cross, RN  Medication Review/Management: medications reviewed     Problem: Gas Exchange Impaired  Goal: Optimal Gas Exchange  Intervention: Optimize Oxygenation and Ventilation  Recent Flowsheet Documentation  Taken 5/22/2024 0200 by Renetta Cross, RN  Head of Bed  (HOB) Positioning: HOB at 30-45 degrees  Taken 5/22/2024 0000 by Renetta Cross RN  Head of Bed (HOB) Positioning: HOB at 30-45 degrees  Taken 5/21/2024 2257 by Renetta Cross RN  Head of Bed (HOB) Positioning: HOB at 30-45 degrees  Taken 5/21/2024 2100 by Renetta Cross RN  Head of Bed (HOB) Positioning: HOB at 30-45 degrees

## 2024-05-22 NOTE — PROGRESS NOTES
Care Management Discharge Note    Discharge Date: 05/22/2024       Discharge Disposition: Transitional Care    Discharge Services: Transportation Services    Discharge DME: None    Discharge Transportation: agency    Private pay costs discussed: transportation costs    Does the patient's insurance plan have a 3 day qualifying hospital stay waiver?  Yes     Which insurance plan 3 day waiver is available? Alternative insurance waiver    Will the waiver be used for post-acute placement? No    PAS Confirmation Code: [id number]  Patient/family educated on Medicare website which has current facility and service quality ratings: yes    Education Provided on the Discharge Plan: Yes (AVS per bedside RN)  Persons Notified of Discharge Plans: pt  Patient/Family in Agreement with the Plan: yes    Handoff Referral Completed: Yes    Additional Information:  [facility]. [facility] to transport via w/c. Pt is aware that there may be a fee for transportation.     Discharge orders sent to [facility].     Pt to follow up with [facility] home physician.     Lucas Courtney RN

## 2024-05-22 NOTE — PLAN OF CARE
Discharge    VSS on RA. Pain well managed w/ use of scheduled and PRN meds. PICC removed w/o complication, PIVs removed w/ some bleeding, now resolved. Discharge education done w/ pt, understanding confirmed. Report called to [facility]. Pt transported by [facility] w/ all belongings.

## 2024-05-22 NOTE — PROGRESS NOTES
Occupational Therapy Discharge Summary    Reason for therapy discharge:    Discharged to transitional care facility.    Progress towards therapy goal(s). See goals on Care Plan in [facility] electronic health record for goal details.  Goals not met.  Barriers to achieving goals:   discharge from facility.    Therapy recommendation(s):    Continued therapy is recommended.  Rationale/Recommendations:  Increase safety/independence for ADL.

## 2024-05-22 NOTE — PROGRESS NOTES
Physical Therapy Discharge Summary    Reason for therapy discharge:    Discharged to transitional care facility.    Progress towards therapy goal(s). See goals on Care Plan in [facility] electronic health record for goal details.  Goals partially met.  Barriers to achieving goals:   discharge from facility.    Therapy recommendation(s):    Continued therapy is recommended.  Rationale/Recommendations:  to increase strength and endurance necessary to return to home.

## 2024-05-22 NOTE — DISCHARGE SUMMARY
[facility]  Discharge Summary - Medicine & Pediatrics       Date of Admission:  5/16/2024  Date of Discharge:  5/22/2024  Discharging Provider: Dr. Campbell  Discharge Service: Hospitalist Service    Discharge Diagnoses   Klebsiella oxytoca bacteremia  Klebsiella oxytoca UTI  Acute hypoxic respiratory failure  Severe sepsis secondary to bacteremia and UTI Klebsiella  Mechanical fall  Hypomagnesemia  Hypokalemia    Clinically Significant Risk Factors     # DMII: A1C = 8.0 % (Ref range: 0.0 - 5.6 %) within past 6 months    # Obesity: Estimated body mass index is 30.18 kg/m  as calculated from the following:    Height as of this encounter: 1.829 m (6').    Weight as of this encounter: 100.9 kg (222 lb 8 oz).       Follow-ups Needed After Discharge   Follow-up Appointments     Follow Up and recommended labs and tests      Follow up with group home physician.  The following labs/tests are   recommended: BMP, CBC.            Unresulted Labs Ordered in the Past 30 Days of this Admission       Date and Time Order Name Status Description    5/19/2024  1:25 PM Blood Culture Peripheral Blood Preliminary     5/19/2024  1:25 PM Blood Culture Peripheral Blood Preliminary         These results will be followed up by nursing home physician    Discharge Disposition   Discharged to short-term care facility  Condition at discharge: Stable    Hospital Course   Lance Ibrahim was admitted on 5/16/2024 for fall and found to have a UTI and bacteriemic.  The following problems were addressed during his hospitalization:    Klebsiella oxytoca bacteremia  Klebsiella oxytoca UTI  Acute hypoxic respiratory failure  Severe sepsis secondary to bacteremia and UTI Klebsiella  Patient became hypotensive with blood pressures of 89/49 on 5/17.  Lactic acid was drawn 1.3 and a fluid bolus was given.  Started on Levophed for additional blood pressure support.  Urine culture positive for Klebsiella oxytoca and started on Levaquin  before transition to vancomycin (one day 5/17) and cefepime (5/17-5/19).  Blood cultures drawn showing Klebsiella as well.  He had a PICC line placed and transferred to ICU.  Patient titrated off Levophed 5/18.  Initially required 8L of supplemental oxygen but was able to be put on room air. Urine culture sensitivities with resistance to ampicillin.  Patient was downgraded from ICU status 5/18. Blood cultures came back positive for klebsiella oxytoca 5/20, repeat cultures negative. Clinically improved. Discontinued Ceftriaxone (5/19-5/21)  - Started levofloxacin (5/21-) for 10 days     Mechanical Fall  Osteoarthritis  Chronic pain  DM neuropathy  Lives home alone, unwitnessed mechanical fall 3 days prior to admission, remained down for several days, unable to get up before son in law found him. Imaging in ER negative for acute process.  Patient has spinal stimulator. PT/OT saw patient during admission. Initially required dilaudid but was on scheduled tylenol and ibuprofen on discharge. Will discharge with tylenol, ibuprofen PRN with some oxycodone as needed.  - Tylenol, heat, ice, Voltaren gel, ibuprofen  - Lidocaine patch     Type 2 diabetes mellitus  A1c 8 two months ago. Was started on lantus 12 units during this hospitalization. He was not on lantus at home before admission.  - Discontinued glipizide on discharge due to concern for hypoglycemia.  - PTA Rybelsus  - Started Glargine 12 units at bedtime     HFpEF  Echo 5/16/2024 with EF 50-55%, mild LV global hyperkinesia, pulmonary HTN.  Patient does not appear fluid overloaded on exam or chest x-ray.  BNP 2058. Continued torsemide.  - Holding PTA Imdur due to initial hypotension, could consider restarting during TCU or by PCP at a later time     DEAN on CKD, resolved  Baseline Cr 1.5-1.6, 1.98 on admission in setting of being down for several days, decreased PO, suspect prerenal. Improved by discharge     HTN  Soft blood pressures initially. Initially held  lisinopril, restarted PTA lisinopril before discharge. Could consider restarting amlodipine if he gets elevated blood pressures.  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   - Follow up outpatient with [facility] as recommended  - PTA Ursodiol 300 mg BID     Hypomagnesemia  Hypokalemia  Was placed on replacement protocols while in the hospital, resolved by time of discharge.     Incidental pulmonary nodule  Seen on imaging last admission.   - Follow up outpatient as recommended     Consultations This Hospital Stay   PHYSICAL THERAPY ADULT IP CONSULT  OCCUPATIONAL THERAPY ADULT IP CONSULT  CARE MANAGEMENT / SOCIAL WORK IP CONSULT  PHYSICAL THERAPY ADULT IP CONSULT  OCCUPATIONAL THERAPY ADULT IP CONSULT  VASCULAR ACCESS ADULT IP CONSULT  PHARMACY TO DOSE VANCO  PHYSICAL THERAPY ADULT IP CONSULT  OCCUPATIONAL THERAPY ADULT IP CONSULT    Code Status   Full Code       The patient was discussed with Dr. Adrian Suarez MD PGY1  [facility] 3 ICU  [address] [place] [id number]  Phone: [phone]  Fax: [phone]  ______________________________________________________________________    Physical Exam   Vital Signs: Temp: 98.3  F (36.8  C) Temp src: Oral BP: 127/60 Pulse: 96   Resp: 18 SpO2: 93 % O2 Device: Nasal cannula Oxygen Delivery: 1.5 LPM  Weight: 222 lbs 8 oz    GENERAL: alert and in no acute distress oriented to person place and time and situation  Ears: hearing grossly intact  Eyes: sclera clear, EOMs intact  RESP: lungs clear to auscultation - no rales, rhonchi or wheezes  CV: Regular rate and rhythm, normal S1 S2, no murmur appreciated  ABDOMEN: soft, nontender, and bowel sounds normal  PSYCH: mentation appears normal, affect normal/bright  EXTREMITIES: Mild lower extremity  pitting edema      Primary Care Physician   Rickey Adamson    Discharge Orders      General info for SNF    Length of Stay Estimate: Short Term Care: Estimated # of Days <30  Condition at Discharge: Improving  Level of care:skilled   Rehabilitation Potential: Good  Admission H&P remains valid and up-to-date: Yes  Recent Chemotherapy: N/A  Use Nursing Home Standing Orders: Yes     Follow Up and recommended labs and tests    Follow up with correction physician.  The following labs/tests are recommended: BMP, CBC.     Reason for your hospital stay    You were hospitalized after a fall. You were found to have a UTI and a blood infection that were treated with antibiotics.     Glucose monitor nursing POCT    Before meals and at bedtime     Activity - Up with nursing assistance     Full Code     Physical Therapy Adult Consult    Evaluate and treat as clinically indicated.    Reason:  Weakness     Occupational Therapy Adult Consult    Evaluate and treat as clinically indicated.    Reason:  Fall, weakness     Fall precautions     Diet    Follow this diet upon discharge: Orders Placed This Encounter      Combination Diet Regular Diet Adult; Moderate Consistent Carb (60 g CHO per Meal) Diet       Significant Results and Procedures   Most Recent 3 CBC's:  Recent Labs   Lab Test 05/22/24  0553 05/21/24  0440 05/20/24  0545   WBC 11.8* 14.4* 13.8*   HGB 8.3* 8.8* 8.7*   MCV 89 88 89    170 148*     Most Recent 3 BMP's:  Recent Labs   Lab Test 05/22/24  0717 05/22/24  0553 05/21/24  2209 05/21/24  1701 05/21/24  1242 05/21/24  0756 05/21/24  0440 05/20/24  0710 05/20/24  0545   NA  --  139  --   --   --   --  140  --  139   POTASSIUM  --  3.6  --   --  3.8  --  3.4  --  3.6   CHLORIDE  --  103  --   --   --   --  105  --  108*   CO2  --  26  --   --   --   --  21*  --  22   BUN  --  31.9*  --   --   --   --  30.9*  --  37.5*   CR  --  1.47*  --   --   --   --  1.27*  --  1.38*   ANIONGAP  --  10  --   --   --   --  14   --  9   SHANNAN  --  8.2*  --   --   --   --  8.5*  --  8.5*   * 153* 149*   < >  --    < > 185*   < > 183*    < > = values in this interval not displayed.     7-Day Micro Results       Collected Updated Procedure Result Status      05/19/2024 1449 05/21/2024 1816 Blood Culture Peripheral Blood [[id number]]    Peripheral Blood    Preliminary result Component Value   Culture No growth after 2 days  [P]                05/19/2024 1449 05/21/2024 1816 Blood Culture Peripheral Blood [[id number]]    Peripheral Blood    Preliminary result Component Value   Culture No growth after 2 days  [P]                05/17/2024 1657 05/22/2024 0732 Blood Culture Peripheral Blood [[id number]]   (Abnormal)   Peripheral Blood    Final result Component Value   Culture Positive on the 2nd day of incubation    Klebsiella oxytoca    1 of 2 bottles  Susceptibilities done on previous cultures               05/17/2024 1652 05/20/2024 0744 Blood Culture Peripheral Blood [[id number]]    (Abnormal)   Peripheral Blood    Final result Component Value   Culture Positive on the 1st day of incubation    Klebsiella oxytoca    1 of 2 bottles          Susceptibility        Klebsiella oxytoca      CASANDRA      Ampicillin  Resistant  [1]       Ampicillin/ Sulbactam 4 ug/mL Susceptible      Cefepime <=1 ug/mL Susceptible      Ceftazidime <=1 ug/mL Susceptible      Ceftriaxone <=1 ug/mL Susceptible      Ciprofloxacin <=0.25 ug/mL Susceptible      Gentamicin <=1 ug/mL Susceptible      Levofloxacin <=0.12 ug/mL Susceptible      Meropenem <=0.25 ug/mL Susceptible      Piperacillin/Tazobactam <=4 ug/mL Susceptible      Tobramycin <=1 ug/mL Susceptible      Trimethoprim/Sulfamethoxazole <=1/19 ug/mL Susceptible                   [1]  Intrinsically Resistant                    05/17/2024 1652 05/18/2024 1927 Verigene GN Panel [[id number]]    (Abnormal)   Peripheral Blood    Final result Component Value   Acinetobacter species Not Detected   Citrobacter  species Not Detected   Enterobacter species Not Detected   Proteus species Not Detected   Escherichia coli Not Detected   Klebsiella pneumoniae Not Detected   Klebsiella oxytoca Detected   Positive for Klebsiella oxytoca by [employer] multiplex nucleic acid test. Final identification and antimicrobial susceptibility testing will be verified by standard methods.   Pseudomonas aeruginosa Not Detected   CTX-M Not Detected   KPC Not Detected   NDM Not Detected   VIM Not Detected   IMP Not Detected   OXA Not Detected            05/16/2024 2001 05/16/2024 2043 Symptomatic Influenza A/B, RSV, & SARS-CoV2 PCR (COVID-19) Nasopharyngeal [[id number]]    Swab from Nasopharyngeal    Final result Component Value   Influenza A PCR Negative   Influenza B PCR Negative   RSV PCR Negative   SARS CoV2 PCR Negative   NEGATIVE: SARS-CoV-2 (COVID-19) RNA not detected, presumed negative.            05/16/2024 1823 05/18/2024 0347 Urine Culture [[id number]]    (Abnormal)   Urine, NOS    Final result Component Value   Culture >100,000 CFU/mL Klebsiella oxytoca        Susceptibility        Klebsiella oxytoca      CASANDRA      Ampicillin  Resistant  [1]       Ampicillin/ Sulbactam 8 ug/mL Susceptible      Cefazolin <=4 ug/mL Susceptible  [2]       Cefepime <=1 ug/mL Susceptible      Cefoxitin <=4 ug/mL Susceptible      Ceftazidime <=1 ug/mL Susceptible      Ceftriaxone <=1 ug/mL Susceptible      Ciprofloxacin <=0.25 ug/mL Susceptible      Gentamicin <=1 ug/mL Susceptible      Levofloxacin <=0.12 ug/mL Susceptible      Nitrofurantoin 32 ug/mL Susceptible      Piperacillin/Tazobactam <=4 ug/mL Susceptible      Tobramycin <=1 ug/mL Susceptible      Trimethoprim/Sulfamethoxazole <=1/19 ug/mL Susceptible                   [1]  Intrinsically Resistant     [2]  Cefazolin CASANDRA breakpoints are for the treatment of uncomplicated urinary tract infections. For the treatment of systemic infections, please contact the laboratory for additional testing.                         ,   Results for orders placed or performed during the hospital encounter of 05/16/24   CT Head w/o Contrast    Narrative    EXAM: CT HEAD W/O CONTRAST  LOCATION: [facility]  DATE: 5/16/2024    INDICATION: fall  COMPARISON: 12/25/2021.  TECHNIQUE: Routine CT Head without IV contrast. Multiplanar reformats. Dose reduction techniques were used.    FINDINGS:  INTRACRANIAL CONTENTS: No intracranial hemorrhage, extraaxial collection, or mass effect.  No CT evidence of acute infarct. Mild presumed chronic small vessel ischemic changes. Mild generalized volume loss. No hydrocephalus.     VISUALIZED ORBITS/SINUSES/MASTOIDS: Prior bilateral cataract surgery. Visualized portions of the orbits are otherwise unremarkable. No paranasal sinus mucosal disease. No middle ear or mastoid effusion.    BONES/SOFT TISSUES: No acute abnormality.      Impression    IMPRESSION:  1.  No CT evidence for acute intracranial process.  2.  Brain atrophy and presumed chronic microvascular ischemic changes as above.   CT Cervical Spine w/o Contrast    Narrative    EXAM: CT CERVICAL SPINE W/O CONTRAST, CT LUMBAR SPINE W/O CONTRAST, CT THORACIC SPINE W/O CONTRAST  LOCATION: [facility]  DATE: 5/16/2024    INDICATION: Fall  COMPARISON: 4/26/2024 CT abdomen/pelvis, 12/25/2021 CT cervical spine.  TECHNIQUE:  1) Routine CT Cervical Spine without IV contrast. Multiplanar reformats. Dose reduction techniques were used.   2) Routine CT Thoracic Spine without IV contrast. Multiplanar reformats. Dose reduction techniques were used.   3) Routine CT Lumbar Spine without IV contrast. Multiplanar reformats. Dose reduction techniques were used.     FINDINGS:    CERVICAL SPINE CT:  VERTEBRA: Normal vertebral body heights. Trace grade 1 anterolisthesis of C4 on C5 and C5 on C6. No fracture or posttraumatic subluxation. Multilevel cervical spondylosis, similar compared to prior imaging. Disc space  height loss is greatest at C6-C7 and   C7-T1. Multilevel endplate osteophyte formation and facet arthropathy. Osseous ankylosis across the bilateral C2-C3 facet joints.    CANAL/FORAMINA: Multilevel neural foraminal narrowing, severe at C3-C4 bilaterally, C4-C5 on the left, C5-C6 on the right, and C6-C7 on the left. Mild to moderate canal stenosis at C3-C4 due to a disc bulge.    PARASPINAL: No extraspinal abnormality.    THORACIC SPINE CT:  VERTEBRA: Normal vertebral body heights and alignment. No fracture or posttraumatic subluxation. Minimal chronic-appearing irregularity of the superior endplate, possibly reflecting an old fracture. Disc spaces are relatively preserved. Multilevel   diffuse idiopathic skeletal hyperostosis. Mild facet arthropathy throughout the upper thoracic spine.    CANAL/FORAMINA: No high-grade canal or neural foraminal stenosis.    PARASPINAL: No acute extraspinal abnormality. Spinal stimulator leads enter the spinal canal at the level of T12-L1 and courses superiorly, terminating at the level of T8. Paraspinal muscle atrophy. Incompletely characterized left renal cyst. Aortic   atherosclerotic calcifications. Scattered emphysematous changes and atelectasis.    LUMBAR SPINE CT:  VERTEBRA: Normal vertebral body heights. Trace retrolisthesis of L4 on L5. No fracture or posttraumatic subluxation. Mild/moderate multilevel disc space height loss, greatest at L5-S1. Multilevel endplate osteophyte formation and lower lumbar facet   arthropathy.    CANAL/FORAMINA: No high-grade canal or neural foraminal stenosis. Moderate bilateral L4-L5 and L5-S1 neural foraminal narrowing.    PARASPINAL: No acute extraspinal abnormality. Aortoiliac atherosclerotic calcifications.      Impression    IMPRESSION:  CERVICAL SPINE CT:  1.  No fracture or posttraumatic subluxation.    THORACIC SPINE CT:  1.  No fracture or posttraumatic subluxation.    LUMBAR SPINE CT:  1.  No fracture or posttraumatic subluxation.      XR Hip Bilateral 1 View Each    Narrative    EXAM: XR PELVIS W 2 VW HIPS BILATERAL  LOCATION: [facility]  DATE: 5/16/2024    INDICATION: Pain after fall  COMPARISON: None.      Impression    IMPRESSION: Degenerative change both hip joints. No evidence for fracture or dislocation. Pelvis negative for fracture.   CT Thoracic Spine w/o Contrast    Narrative    EXAM: CT CERVICAL SPINE W/O CONTRAST, CT LUMBAR SPINE W/O CONTRAST, CT THORACIC SPINE W/O CONTRAST  LOCATION: [facility]  DATE: 5/16/2024    INDICATION: Fall  COMPARISON: 4/26/2024 CT abdomen/pelvis, 12/25/2021 CT cervical spine.  TECHNIQUE:  1) Routine CT Cervical Spine without IV contrast. Multiplanar reformats. Dose reduction techniques were used.   2) Routine CT Thoracic Spine without IV contrast. Multiplanar reformats. Dose reduction techniques were used.   3) Routine CT Lumbar Spine without IV contrast. Multiplanar reformats. Dose reduction techniques were used.     FINDINGS:    CERVICAL SPINE CT:  VERTEBRA: Normal vertebral body heights. Trace grade 1 anterolisthesis of C4 on C5 and C5 on C6. No fracture or posttraumatic subluxation. Multilevel cervical spondylosis, similar compared to prior imaging. Disc space height loss is greatest at C6-C7 and   C7-T1. Multilevel endplate osteophyte formation and facet arthropathy. Osseous ankylosis across the bilateral C2-C3 facet joints.    CANAL/FORAMINA: Multilevel neural foraminal narrowing, severe at C3-C4 bilaterally, C4-C5 on the left, C5-C6 on the right, and C6-C7 on the left. Mild to moderate canal stenosis at C3-C4 due to a disc bulge.    PARASPINAL: No extraspinal abnormality.    THORACIC SPINE CT:  VERTEBRA: Normal vertebral body heights and alignment. No fracture or posttraumatic subluxation. Minimal chronic-appearing irregularity of the superior endplate, possibly reflecting an old fracture. Disc spaces are relatively preserved. Multilevel   diffuse  idiopathic skeletal hyperostosis. Mild facet arthropathy throughout the upper thoracic spine.    CANAL/FORAMINA: No high-grade canal or neural foraminal stenosis.    PARASPINAL: No acute extraspinal abnormality. Spinal stimulator leads enter the spinal canal at the level of T12-L1 and courses superiorly, terminating at the level of T8. Paraspinal muscle atrophy. Incompletely characterized left renal cyst. Aortic   atherosclerotic calcifications. Scattered emphysematous changes and atelectasis.    LUMBAR SPINE CT:  VERTEBRA: Normal vertebral body heights. Trace retrolisthesis of L4 on L5. No fracture or posttraumatic subluxation. Mild/moderate multilevel disc space height loss, greatest at L5-S1. Multilevel endplate osteophyte formation and lower lumbar facet   arthropathy.    CANAL/FORAMINA: No high-grade canal or neural foraminal stenosis. Moderate bilateral L4-L5 and L5-S1 neural foraminal narrowing.    PARASPINAL: No acute extraspinal abnormality. Aortoiliac atherosclerotic calcifications.      Impression    IMPRESSION:  CERVICAL SPINE CT:  1.  No fracture or posttraumatic subluxation.    THORACIC SPINE CT:  1.  No fracture or posttraumatic subluxation.    LUMBAR SPINE CT:  1.  No fracture or posttraumatic subluxation.     CT Lumbar Spine w/o Contrast    Narrative    EXAM: CT CERVICAL SPINE W/O CONTRAST, CT LUMBAR SPINE W/O CONTRAST, CT THORACIC SPINE W/O CONTRAST  LOCATION: [facility]  DATE: 5/16/2024    INDICATION: Fall  COMPARISON: 4/26/2024 CT abdomen/pelvis, 12/25/2021 CT cervical spine.  TECHNIQUE:  1) Routine CT Cervical Spine without IV contrast. Multiplanar reformats. Dose reduction techniques were used.   2) Routine CT Thoracic Spine without IV contrast. Multiplanar reformats. Dose reduction techniques were used.   3) Routine CT Lumbar Spine without IV contrast. Multiplanar reformats. Dose reduction techniques were used.     FINDINGS:    CERVICAL SPINE CT:  VERTEBRA: Normal  vertebral body heights. Trace grade 1 anterolisthesis of C4 on C5 and C5 on C6. No fracture or posttraumatic subluxation. Multilevel cervical spondylosis, similar compared to prior imaging. Disc space height loss is greatest at C6-C7 and   C7-T1. Multilevel endplate osteophyte formation and facet arthropathy. Osseous ankylosis across the bilateral C2-C3 facet joints.    CANAL/FORAMINA: Multilevel neural foraminal narrowing, severe at C3-C4 bilaterally, C4-C5 on the left, C5-C6 on the right, and C6-C7 on the left. Mild to moderate canal stenosis at C3-C4 due to a disc bulge.    PARASPINAL: No extraspinal abnormality.    THORACIC SPINE CT:  VERTEBRA: Normal vertebral body heights and alignment. No fracture or posttraumatic subluxation. Minimal chronic-appearing irregularity of the superior endplate, possibly reflecting an old fracture. Disc spaces are relatively preserved. Multilevel   diffuse idiopathic skeletal hyperostosis. Mild facet arthropathy throughout the upper thoracic spine.    CANAL/FORAMINA: No high-grade canal or neural foraminal stenosis.    PARASPINAL: No acute extraspinal abnormality. Spinal stimulator leads enter the spinal canal at the level of T12-L1 and courses superiorly, terminating at the level of T8. Paraspinal muscle atrophy. Incompletely characterized left renal cyst. Aortic   atherosclerotic calcifications. Scattered emphysematous changes and atelectasis.    LUMBAR SPINE CT:  VERTEBRA: Normal vertebral body heights. Trace retrolisthesis of L4 on L5. No fracture or posttraumatic subluxation. Mild/moderate multilevel disc space height loss, greatest at L5-S1. Multilevel endplate osteophyte formation and lower lumbar facet   arthropathy.    CANAL/FORAMINA: No high-grade canal or neural foraminal stenosis. Moderate bilateral L4-L5 and L5-S1 neural foraminal narrowing.    PARASPINAL: No acute extraspinal abnormality. Aortoiliac atherosclerotic calcifications.      Impression     IMPRESSION:  CERVICAL SPINE CT:  1.  No fracture or posttraumatic subluxation.    THORACIC SPINE CT:  1.  No fracture or posttraumatic subluxation.    LUMBAR SPINE CT:  1.  No fracture or posttraumatic subluxation.     XR Shoulder Left 2 Views    Narrative    EXAM: XR SHOULDER LEFT 2 VIEWS  LOCATION: [facility]  DATE: 5/16/2024    INDICATION: Pain after fall.  COMPARISON: None.      Impression    IMPRESSION: Postoperative changes to the humeral head with screw fixation. Severe superimposed degenerative change at the glenohumeral joint but no evidence for acute-appearing fracture or dislocation. Chronic-appearing irregularity of the greater   tuberosity likely has a posttraumatic origin but appears chronic. Mild hypertrophic change at the AC joint. The clavicle is negative.   XR Shoulder Right 2 Views    Narrative    EXAM: XR SHOULDER RIGHT 2 VIEWS  LOCATION: [facility]  DATE: 5/16/2024    INDICATION: Pain after fall  COMPARISON: None.      Impression    IMPRESSION: The right glenohumeral and acromioclavicular joints are negative for fracture or dislocation. There is degenerative change of both joints. The right clavicle is negative for fracture.   CT Chest Pulmonary Embolism w Contrast    Narrative    EXAM: CT CHEST PULMONARY EMBOLISM W CONTRAST  LOCATION: [facility]  DATE: 5/16/2024    INDICATION: Hypoxia.  COMPARISON: None.  TECHNIQUE: CT chest pulmonary angiogram during arterial phase injection of IV contrast. Multiplanar reformats and MIP reconstructions were performed. Dose reduction techniques were used.   CONTRAST: 75 ML ISOVUE 370.    FINDINGS:  ANGIOGRAM CHEST: Pulmonary arteries are normal caliber and negative for pulmonary emboli. Thoracic aorta is negative for dissection. No CT evidence of right heart strain.    LUNGS AND PLEURA: Mild emphysematous changes in the lungs. Subpleural atelectasis in the lower lungs posteriorly  greater on the left. Nothing definite for acute pneumonitis. Subpleural biapical scarring. No pleural effusions.    MEDIASTINUM/AXILLAE: Normal.    CORONARY ARTERY CALCIFICATION: Moderate to severe.    UPPER ABDOMEN: Pneumobilia compatible with prior biliary intervention. Benign left renal cyst. Spleen is enlarged measuring up to 15 cm.    MUSCULOSKELETAL: Severe degenerative arthritis left shoulder. Screw fixation left humeral head. Moderate-to-marked hypertrophic and degenerative changes in the spine. Intrathecal catheter device in place.      Impression    IMPRESSION:  1.  Negative for pulmonary embolism.    2.  Emphysematous changes in the lungs.    3.  Subpleural scarring and atelectasis in the lungs. No evidence for pneumonitis.    4.  Prominent coronary artery calcification.    5.  Splenic enlargement.    6.  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   XR Chest 1 View    Narrative    EXAM: XR CHEST 1 VIEW  LOCATION: [facility]  DATE: 5/17/2024    INDICATION: SOB  COMPARISON: None.      Impression    IMPRESSION: Platelike atelectasis or infiltrate in the left base. Hyperinflation. Mild cardiac enlargement. Intrathecal catheter.   XR Chest Port 1 View    Narrative    EXAM: XR CHEST PORT 1 VIEW  LOCATION: [facility]  DATE: 5/17/2024    INDICATION: RN placed PICC   verify tip placement  COMPARISON: 5/17/2024      Impression    IMPRESSION: Right-sided PICC line with tip over atrial caval junction. No pneumothorax or pleural effusion. No focal consolidation. Borderline enlarged cardiac silhouette. Tortuous atherosclerotic aorta. Spinal stimulator device.   XR Chest 1 View    Narrative    EXAM: XR CHEST 1 VIEW  LOCATION: [facility]  DATE: 5/18/2024    INDICATION: Choked.  COMPARISON: Chest radiograph 5/17/2024.      Impression    IMPRESSION:     Right PICC tip is poorly visualized but is likely in the  lower SVC. Unchanged spinal stimulator leads with tips overlying the mid thoracic spine. Left humeral screws are also unchanged.    Mild bibasilar atelectasis. No focal airspace opacities, pleural effusions, or pneumothorax. Pulmonary vascularity is within normal limits.    Stable cardiomediastinal silhouette. Aortic atherosclerotic calcifications.   XR Knee Left 3 Views    Narrative    EXAM: XR KNEE LEFT 3 VIEWS  LOCATION: [facility]  DATE: 2024    INDICATION: Left knee pain, fall, hx osteoarthritis.  COMPARISON: None.      Impression    IMPRESSION: Moderate osteoarthrosis of the medial compartment. Mild osteoarthrosis of the lateral compartment. Severe osteoarthrosis of the patellofemoral compartment. Arterial calcifications. Mild lateral subluxation and tilt of the patella. Moderate   effusion. There is no evidence of fracture or calcified intra-articular body.   Echocardiogram Complete     Value    LVEF  50-55% (borderline)    Narrative    [id number]  [id number]  [id number]  [id number]^GINO^[place] [address]     Name: CURTIS SAHU  MRN: [id number]  : 1945  Study Date: 2024 10:04 AM  Age: 79 yrs  Gender: Male  Patient Location: [facility]  Reason For Study: Heart Failure  Ordering Physician: NOE CHRISTIAN  Performed By: BRIDGET     BSA: 2.2 m2  Height: 72 in  Weight: 210 lb  HR: 99  ______________________________________________________________________________  Procedure  Complete Portable Echo Adult. Definity (NDC #78913-733) given intravenously.  ______________________________________________________________________________  Interpretation Summary     1. The left ventricle is normal in size. Left ventricular function is  decreased. The ejection fraction is 50-55% (borderline). There is mild  concentric left ventricular hypertrophy. Left ventricular diastolic function  is abnormal. There is borderline global hypokinesia of the left  ventricle.  Wall motion is consistent with conduction abnormality.  2. Normal right ventricle size and systolic function.  3. Moderate aortic valve calcification is present. Mild valvular aortic  stenosis.  4. RA pressure of 8 mmHg.  Compared to study on 3/16/2020, mild aortic stenosis is present on current  study.  ______________________________________________________________________________  Left Ventricle  The left ventricle is normal in size. Left ventricular function is decreased.  The ejection fraction is 50-55% (borderline). There is mild concentric left  ventricular hypertrophy. Left ventricular diastolic function is abnormal.  There is borderline global hypokinesia of the left ventricle. Septal motion is  consistent with conduction abnormality.     Right Ventricle  Normal right ventricle size and systolic function.     Atria  The left atrium is borderline dilated. Right atrial size is normal. There is  no color Doppler evidence of an atrial shunt.     Mitral Valve  The mitral valve leaflets are mildly thickened. There is trace to mild mitral  regurgitation. There is no mitral valve stenosis.     Tricuspid Valve  Tricuspid valve leaflets appear normal. There is no evidence of tricuspid  stenosis or clinically significant tricuspid regurgitation. The right  ventricular systolic pressure is approximated at 33.4 mmHg plus the right  atrial pressure. Right ventricle systolic pressure estimate normal.     Aortic Valve  Moderate aortic valve calcification is present. No aortic regurgitation is  present. Mild valvular aortic stenosis.     Pulmonic Valve  The pulmonic valve is not well seen, but is grossly normal. This degree of  valvular regurgitation is within normal limits. There is trace pulmonic  valvular regurgitation.     Vessels  The aorta root is normal. IVC diameter and respiratory changes fall into an  intermediate range suggesting an RA pressure of 8 mmHg.     Pericardium  There is no pericardial  effusion.     ______________________________________________________________________________  MMode/2D Measurements & Calculations  IVSd: 1.3 cm  LVIDd: 4.5 cm  LVIDs: 3.4 cm  LVPWd: 1.3 cm     FS: 23.3 %  LV mass(C)d: 224.1 grams  LV mass(C)dI: 103.0 grams/m2  Ao root diam: 3.5 cm  LA dimension: 3.2 cm  asc Aorta Diam: 3.0 cm  LA/Ao: 0.91  LVOT diam: 2.2 cm  LVOT area: 3.8 cm2  Ao root diam index Ht(cm/m): 1.9  Ao root diam index BSA (cm/m2): 1.6  Asc Ao diam index BSA (cm/m2): 1.4  Asc Ao diam index Ht(cm/m): 1.6  EF Biplane: 57.7 %  LA Volume (BP): 54.0 ml     LA Volume Index (BP): 24.8 ml/m2  LA Volume Indexed (AL/bp): 26.1 ml/m2  RV Base: 4.4 cm  RWT: 0.60  TAPSE: 3.0 cm     Doppler Measurements & Calculations  MV E max edward: 66.9 cm/sec  MV A max edward: 87.2 cm/sec  MV E/A: 0.77     MV max P.1 mmHg  MV mean PG: 3.0 mmHg  MV V2 VTI: 23.5 cm  MVA(VTI): 2.6 cm2  MV dec slope: 345.0 cm/sec2  MV dec time: 0.19 sec  Ao V2 max: 200.7 cm/sec  Ao max P.0 mmHg  Ao V2 mean: 142.0 cm/sec  Ao mean PG: 10.0 mmHg  Ao V2 VTI: 35.3 cm  SHILO(I,D): 1.8 cm2  SHILO(V,D): 1.7 cm2  LV V1 max PG: 3.4 mmHg  LV V1 max: 91.9 cm/sec  LV V1 VTI: 16.3 cm  SV(LVOT): 62.0 ml  SI(LVOT): 28.5 ml/m2  PA V2 max: 111.0 cm/sec  PA max P.9 mmHg  PA acc time: 0.08 sec  PI end-d edward: 141.0 cm/sec  TR max edward: 289.0 cm/sec  TR max P.4 mmHg  AV Edward Ratio (DI): 0.46  SHILO Index (cm2/m2): 0.81  E/E' avg: 10.0  Lateral E/e': 9.8  Medial E/e': 10.2  RV S Edward: 15.6 cm/sec     ______________________________________________________________________________  Report approved by: Lisbet Kulkarni 2024 02:33 PM           *Note: Due to a large number of results and/or encounters for the requested time period, some results have not been displayed. A complete set of results can be found in Results Review.       Discharge Medications   Current Discharge Medication List        START taking these medications    Details   acetaminophen (TYLENOL)  325 MG tablet Take 3 tablets (975 mg) by mouth 3 times daily as needed for mild pain  Qty: 30 tablet, Refills: 0    Associated Diagnoses: Chronic low back pain without sciatica, unspecified back pain laterality      ibuprofen (ADVIL/MOTRIN) 400 MG tablet Take 1 tablet (400 mg) by mouth every 6 hours as needed for moderate pain  Qty: 30 tablet, Refills: 0    Associated Diagnoses: Chronic low back pain without sciatica, unspecified back pain laterality      levofloxacin (LEVAQUIN) 750 MG tablet Take 1 tablet (750 mg) by mouth daily for 9 days  Qty: 9 tablet, Refills: 0    Associated Diagnoses: Bacteremia; Acute cystitis without hematuria      methocarbamol (ROBAXIN) 500 MG tablet Take 1 tablet (500 mg) by mouth 4 times daily as needed for muscle spasms  Qty: 30 tablet, Refills: 0    Associated Diagnoses: Chronic low back pain without sciatica, unspecified back pain laterality      oxyCODONE (ROXICODONE) 5 MG tablet Take 1 tablet (5 mg) by mouth every 6 hours as needed for severe pain  Qty: 8 tablet, Refills: 0    Associated Diagnoses: Chronic low back pain without sciatica, unspecified back pain laterality      polyethylene glycol (MIRALAX) 17 GM/Dose powder Take 17 g by mouth daily as needed for constipation  Qty: 225 g, Refills: 0    Associated Diagnoses: Chronic low back pain without sciatica, unspecified back pain laterality           CONTINUE these medications which have NOT CHANGED    Details   aspirin 81 MG EC tablet Take 1 tablet (81 mg) by mouth daily    Associated Diagnoses: Coronary artery disease involving native coronary artery of native heart without angina pectoris      calcium carbonate (TUMS) 500 MG chewable tablet Take 1,000 mg by mouth 3 times daily as needed for heartburn      colchicine (COLCYRS) 0.6 MG tablet TAKE 1 TABLET DAILY  Qty: 90 tablet, Refills: 3    Associated Diagnoses: Gouty arthropathy      diclofenac (VOLTAREN) 1 % topical gel Apply 4 g topically 4 times daily as needed for moderate  pain      emollient (VANICREAM) external cream Apply topically 3 times daily  Qty: 113 g, Refills: 3    Associated Diagnoses: Rash      famotidine (PEPCID) 20 MG tablet Take 1 tablet (20 mg) by mouth 2 times daily as needed (itching)  Qty: 30 tablet, Refills: 0      Ferrous Gluconate 240 (27 Fe) MG TABS Take 1 tablet by mouth daily       fluticasone (FLONASE) 50 MCG/ACT nasal spray Spray 1-2 sprays in nostril daily as needed      gabapentin (NEURONTIN) 300 MG capsule Take 1 capsule (300 mg) by mouth 3 times daily as needed    Associated Diagnoses: Diabetic mononeuropathy associated with type 2 diabetes mellitus (H)      glipiZIDE (GLUCOTROL XL) 2.5 MG 24 hr tablet Take 1 tablet (2.5 mg) by mouth daily  Qty: 30 tablet, Refills: 0    Associated Diagnoses: Diabetic mononeuropathy associated with type 2 diabetes mellitus (H)      HEMP OIL OR EXTRACT OR OTHER CBD CANNABINOID, NOT MEDICAL CANNABIS, Take 1 chew tab by mouth at bedtime CBD gummy      hydrOXYzine HCl (ATARAX) 25 MG tablet Take 1 tablet (25 mg) by mouth 3 times daily as needed for itching  Qty: 20 tablet, Refills: 0      Lidocaine (LIDOCARE) 4 % Patch Place 1 patch onto the skin daily as needed To prevent lidocaine toxicity, patient should be patch free for 12 hrs daily.      lisinopril (ZESTRIL) 10 MG tablet Take 1 tablet (10 mg) by mouth every 24 hours  Qty: 90 tablet, Refills: 3    Associated Diagnoses: Chronic diastolic heart failure (H)      loratadine (CLARITIN) 10 MG tablet Take 10 mg by mouth daily as needed for allergies      melatonin 5 MG CAPS Take 5 mg by mouth at bedtime      multivitamin w/minerals (THERA-VIT-M) tablet Take 1 tablet by mouth daily Stopping 5/13/24 before surgery      nortriptyline (PAMELOR) 10 MG capsule Take 20 mg by mouth nightly as needed      Probiotic Product (FORTIFY 30 BILLION PROBIOT 50+) CPDR Take 1 5 Pack by mouth daily  Qty: 90 capsule, Refills: 1    Associated Diagnoses: Abdominal bloating      Semaglutide (RYBELSUS)  "14 MG tablet Take 14 mg by mouth daily  Qty: 30 tablet, Refills: 3    Associated Diagnoses: Type 2 diabetes, HbA1c goal < 7% (H)      sodium bicarbonate 650 MG tablet Take 3 tablets (1,950 mg) by mouth 3 times daily  Qty: 180 tablet, Refills: 1    Associated Diagnoses: Acidosis      torsemide (DEMADEX) 20 MG tablet TAKE 1 TABLET DAILY  Qty: 90 tablet, Refills: 3    Associated Diagnoses: Essential hypertension      ursodiol (ACTIGALL) 300 MG capsule Take 1 capsule (300 mg) by mouth 2 times daily  Qty: 60 capsule, Refills: 0    Associated Diagnoses: Biliary stricture (H28)      blood glucose monitoring (NO BRAND SPECIFIED) meter device kit Use to test blood sugar 2 times daily or as directed.  Qty: 1 kit, Refills: 0    Associated Diagnoses: Type 2 diabetes mellitus with peripheral vascular disease (H)      CONTOUR NEXT TEST test strip USE TO TEST BLOOD SUGAR 2 TIMES DAILY OR AS DIRECTED.  Qty: 100 strip, Refills: 0    Associated Diagnoses: Type 2 diabetes mellitus with peripheral vascular disease (H)      Microlet Lancets MISC USE TO TEST BLOOD SUGAR TWICE A DAY OR AS DIRECTED  Qty: 100 each, Refills: 1    Associated Diagnoses: Type 2 diabetes, HbA1c goal < 7% (H)           STOP taking these medications       amLODIPine (NORVASC) 5 MG tablet Comments:   Reason for Stopping:         isosorbide mononitrate (IMDUR) 30 MG 24 hr tablet Comments:   Reason for Stopping:             Allergies   Allergies   Allergen Reactions    Ancef [Cefazolin] Rash    Cephalexin Itching and Rash     Allergic reaction required hospitalization 4/2024    Penicillins Anaphylaxis    Sulfa Antibiotics      \"itchy rash, swelling of face and hands\"     "

## 2024-05-22 NOTE — PLAN OF CARE
Problem: Pain Acute  Goal: Optimal Pain Control and Function  Outcome: Unable to Meet  Intervention: Develop Pain Management Plan  Recent Flowsheet Documentation  Taken 5/21/2024 1901 by Andria Macdonald, RN  Pain Management Interventions:   medication (see MAR)   distraction   pillow support provided   quiet environment facilitated   relaxation techniques promoted   repositioned   rest  Taken 5/21/2024 1809 by Andria Macdonald, RN  Pain Management Interventions:   medication (see MAR)   declines   massage provided   pillow support provided   repositioned   relaxation techniques promoted   rest  Intervention: Prevent or Manage Pain  Recent Flowsheet Documentation  Taken 5/21/2024 1747 by Andria Macdonald, RN  Sleep/Rest Enhancement:   massage given   relaxation techniques promoted   therapeutic touch utilized   visualization   Goal Outcome Evaluation:  Pt given po pain medications to assist with pain control, 10/10, moaning, calling out. Pt repositioned multiple times. Pt appears more comfortable. Pt spo2 94% on monitor, continuous pulse ox applied, pm rn aware.     Pt has urinal at bedside, frequent voiding.     Pt uses call light and understands the risk of falls without staff present.     Knee xray complete.     Po prn sleep medication available, pt requested.

## 2024-05-23 ENCOUNTER — PATIENT OUTREACH (OUTPATIENT)
Dept: CARE COORDINATION | Facility: CLINIC | Age: 79
End: 2024-05-23
Payer: COMMERCIAL

## 2024-05-23 NOTE — LETTER
[facility]       To:  TCU SW            Please give to facility      From:   Bonnie CAIN  Care Coordinator   [facility]   P: [phone]  [email or web address]        Patient Name:      Lance Ibrahim YOB: 1945   Admit date:   5-        Information Needed:  Please contact me when the patient will discharge (or if they will move to long term care)- include the discharge date, disposition, and main diagnosis       If the patient is discharged with home care services, please provide the name of the agency    Also- Please inform me if a care conference is being held.     Phone or Email with information                              Thank you

## 2024-05-23 NOTE — PROGRESS NOTES
Clinic Care Coordination Contact  Care Coordination Transition Communication    Clinical Data: Patient was hospitalized at   [facility]  Discharge Summary - Medicine & Pediatrics       Date of Admission:  5/16/2024  Date of Discharge:  5/22/2024          Discharge Diagnoses  Klebsiella oxytoca bacteremia  Klebsiella oxytoca UTI  Acute hypoxic respiratory failure  Severe sepsis secondary to bacteremia and UTI Klebsiella  Mechanical fall  Hypomagnesemia  Hypokalemia  Assessment: Patient has transitioned to TCU/ARU for short term rehabilitation:    Facility Name: [facility]  Transition Communication:  Notified facility of Primary Care- Care Coordination support via Epic In-Basket message.    Plan: Care Coordinator will await notification from facility staff informing of patient's discharge plans/needs. Care Coordinator will review chart and outreach to facility staff every 4 weeks and as needed.     Bonnie Kilpatrick,   [facility]  [phone]

## 2024-05-24 LAB
BACTERIA BLD CULT: NO GROWTH
BACTERIA BLD CULT: NO GROWTH

## 2024-05-24 NOTE — PROGRESS NOTES
Addendum to discharge summary 5/22/2024    Additional diagnoses:  CKD 3a  Patient GFR was 48 on discharge.  Patient's baseline creatinine 1.5-1.6, 1.47 on discharge.    Thais Suarez MD PGY1

## 2024-05-25 ENCOUNTER — LAB REQUISITION (OUTPATIENT)
Dept: LAB | Facility: CLINIC | Age: 79
End: 2024-05-25
Payer: COMMERCIAL

## 2024-05-25 DIAGNOSIS — A41.89 OTHER SPECIFIED SEPSIS (H): ICD-10-CM

## 2024-05-28 LAB
ALBUMIN SERPL BCG-MCNC: 2.5 G/DL (ref 3.5–5.2)
ALP SERPL-CCNC: 319 U/L (ref 40–150)
ALT SERPL W P-5'-P-CCNC: 44 U/L (ref 0–70)
ANION GAP SERPL CALCULATED.3IONS-SCNC: 15 MMOL/L (ref 7–15)
AST SERPL W P-5'-P-CCNC: 44 U/L (ref 0–45)
BASOPHILS # BLD MANUAL: 0 10E3/UL (ref 0–0.2)
BASOPHILS NFR BLD MANUAL: 0 %
BILIRUB DIRECT SERPL-MCNC: 0.65 MG/DL (ref 0–0.3)
BILIRUB SERPL-MCNC: 1 MG/DL
BUN SERPL-MCNC: 19.4 MG/DL (ref 8–23)
CALCIUM SERPL-MCNC: 8.4 MG/DL (ref 8.8–10.2)
CHLORIDE SERPL-SCNC: 92 MMOL/L (ref 98–107)
CREAT SERPL-MCNC: 1.56 MG/DL (ref 0.67–1.17)
DEPRECATED HCO3 PLAS-SCNC: 28 MMOL/L (ref 22–29)
EGFRCR SERPLBLD CKD-EPI 2021: 45 ML/MIN/1.73M2
EOSINOPHIL # BLD MANUAL: 0 10E3/UL (ref 0–0.7)
EOSINOPHIL NFR BLD MANUAL: 0 %
ERYTHROCYTE [DISTWIDTH] IN BLOOD BY AUTOMATED COUNT: 17.8 % (ref 10–15)
GLUCOSE SERPL-MCNC: 126 MG/DL (ref 70–99)
HCT VFR BLD AUTO: 29.2 % (ref 40–53)
HGB BLD-MCNC: 9.1 G/DL (ref 13.3–17.7)
LYMPHOCYTES # BLD MANUAL: 1.7 10E3/UL (ref 0.8–5.3)
LYMPHOCYTES NFR BLD MANUAL: 14 %
MCH RBC QN AUTO: 28.9 PG (ref 26.5–33)
MCHC RBC AUTO-ENTMCNC: 31.2 G/DL (ref 31.5–36.5)
MCV RBC AUTO: 93 FL (ref 78–100)
MONOCYTES # BLD MANUAL: 1.1 10E3/UL (ref 0–1.3)
MONOCYTES NFR BLD MANUAL: 9 %
MYELOCYTES # BLD MANUAL: 0.1 10E3/UL
MYELOCYTES NFR BLD MANUAL: 1 %
NEUTROPHILS # BLD MANUAL: 9.2 10E3/UL (ref 1.6–8.3)
NEUTROPHILS NFR BLD MANUAL: 76 %
PLAT MORPH BLD: ABNORMAL
PLATELET # BLD AUTO: 409 10E3/UL (ref 150–450)
POLYCHROMASIA BLD QL SMEAR: SLIGHT
POTASSIUM SERPL-SCNC: 3 MMOL/L (ref 3.4–5.3)
PROT SERPL-MCNC: 6.7 G/DL (ref 6.4–8.3)
RBC # BLD AUTO: 3.15 10E6/UL (ref 4.4–5.9)
RBC MORPH BLD: ABNORMAL
SODIUM SERPL-SCNC: 135 MMOL/L (ref 135–145)
WBC # BLD AUTO: 12.1 10E3/UL (ref 4–11)

## 2024-05-28 PROCEDURE — 85007 BL SMEAR W/DIFF WBC COUNT: CPT | Performed by: NURSE PRACTITIONER

## 2024-05-28 PROCEDURE — P9603 ONE-WAY ALLOW PRORATED MILES: HCPCS | Performed by: NURSE PRACTITIONER

## 2024-05-28 PROCEDURE — 85027 COMPLETE CBC AUTOMATED: CPT | Performed by: NURSE PRACTITIONER

## 2024-05-28 PROCEDURE — 80053 COMPREHEN METABOLIC PANEL: CPT | Performed by: NURSE PRACTITIONER

## 2024-05-28 PROCEDURE — 82248 BILIRUBIN DIRECT: CPT | Performed by: NURSE PRACTITIONER

## 2024-05-28 PROCEDURE — 36415 COLL VENOUS BLD VENIPUNCTURE: CPT | Performed by: NURSE PRACTITIONER

## 2024-05-29 ENCOUNTER — LAB REQUISITION (OUTPATIENT)
Dept: LAB | Facility: CLINIC | Age: 79
End: 2024-05-29
Payer: COMMERCIAL

## 2024-05-29 DIAGNOSIS — E87.20 ACIDOSIS, UNSPECIFIED: ICD-10-CM

## 2024-05-30 LAB
ALBUMIN SERPL BCG-MCNC: 2.8 G/DL (ref 3.5–5.2)
ALP SERPL-CCNC: 240 U/L (ref 40–150)
ALT SERPL W P-5'-P-CCNC: 38 U/L (ref 0–70)
ANION GAP SERPL CALCULATED.3IONS-SCNC: 13 MMOL/L (ref 7–15)
AST SERPL W P-5'-P-CCNC: 56 U/L (ref 0–45)
BILIRUB SERPL-MCNC: 0.9 MG/DL
BUN SERPL-MCNC: 23.2 MG/DL (ref 8–23)
CALCIUM SERPL-MCNC: 8.8 MG/DL (ref 8.8–10.2)
CHLORIDE SERPL-SCNC: 97 MMOL/L (ref 98–107)
CREAT SERPL-MCNC: 1.5 MG/DL (ref 0.67–1.17)
DEPRECATED HCO3 PLAS-SCNC: 27 MMOL/L (ref 22–29)
EGFRCR SERPLBLD CKD-EPI 2021: 47 ML/MIN/1.73M2
ERYTHROCYTE [DISTWIDTH] IN BLOOD BY AUTOMATED COUNT: 17.4 % (ref 10–15)
GLUCOSE SERPL-MCNC: 128 MG/DL (ref 70–99)
HCT VFR BLD AUTO: 31.8 % (ref 40–53)
HGB BLD-MCNC: 9.7 G/DL (ref 13.3–17.7)
MCH RBC QN AUTO: 28.8 PG (ref 26.5–33)
MCHC RBC AUTO-ENTMCNC: 30.5 G/DL (ref 31.5–36.5)
MCV RBC AUTO: 94 FL (ref 78–100)
PLATELET # BLD AUTO: 570 10E3/UL (ref 150–450)
POTASSIUM SERPL-SCNC: 4 MMOL/L (ref 3.4–5.3)
PROT SERPL-MCNC: 7.1 G/DL (ref 6.4–8.3)
RBC # BLD AUTO: 3.37 10E6/UL (ref 4.4–5.9)
SODIUM SERPL-SCNC: 137 MMOL/L (ref 135–145)
WBC # BLD AUTO: 12.9 10E3/UL (ref 4–11)

## 2024-05-30 PROCEDURE — 85027 COMPLETE CBC AUTOMATED: CPT | Performed by: NURSE PRACTITIONER

## 2024-05-30 PROCEDURE — 36415 COLL VENOUS BLD VENIPUNCTURE: CPT | Performed by: NURSE PRACTITIONER

## 2024-05-30 PROCEDURE — P9603 ONE-WAY ALLOW PRORATED MILES: HCPCS | Performed by: NURSE PRACTITIONER

## 2024-05-30 PROCEDURE — 80053 COMPREHEN METABOLIC PANEL: CPT | Performed by: NURSE PRACTITIONER

## 2024-05-31 ENCOUNTER — HOSPITAL ENCOUNTER (OUTPATIENT)
Facility: CLINIC | Age: 79
End: 2024-05-31
Attending: ORTHOPAEDIC SURGERY | Admitting: ORTHOPAEDIC SURGERY
Payer: COMMERCIAL

## 2024-05-31 ENCOUNTER — TRANSFERRED RECORDS (OUTPATIENT)
Dept: HEALTH INFORMATION MANAGEMENT | Facility: CLINIC | Age: 79
End: 2024-05-31
Payer: COMMERCIAL

## 2024-06-01 ENCOUNTER — HEALTH MAINTENANCE LETTER (OUTPATIENT)
Age: 79
End: 2024-06-01

## 2024-06-17 ENCOUNTER — APPOINTMENT (OUTPATIENT)
Dept: CT IMAGING | Facility: CLINIC | Age: 79
End: 2024-06-17
Attending: EMERGENCY MEDICINE
Payer: COMMERCIAL

## 2024-06-17 ENCOUNTER — HOSPITAL ENCOUNTER (EMERGENCY)
Facility: CLINIC | Age: 79
Discharge: HOME | End: 2024-06-18
Attending: EMERGENCY MEDICINE | Admitting: EMERGENCY MEDICINE
Payer: COMMERCIAL

## 2024-06-17 ENCOUNTER — OFFICE VISIT (OUTPATIENT)
Dept: FAMILY MEDICINE | Facility: CLINIC | Age: 79
End: 2024-06-17
Payer: COMMERCIAL

## 2024-06-17 VITALS
RESPIRATION RATE: 18 BRPM | SYSTOLIC BLOOD PRESSURE: 131 MMHG | HEART RATE: 97 BPM | DIASTOLIC BLOOD PRESSURE: 76 MMHG | TEMPERATURE: 98.6 F | OXYGEN SATURATION: 100 %

## 2024-06-17 DIAGNOSIS — M54.41 LOW BACK PAIN WITH RIGHT-SIDED SCIATICA, UNSPECIFIED BACK PAIN LATERALITY, UNSPECIFIED CHRONICITY: ICD-10-CM

## 2024-06-17 DIAGNOSIS — M46.46 DISCITIS OF LUMBAR REGION: ICD-10-CM

## 2024-06-17 DIAGNOSIS — M54.50 ACUTE RIGHT-SIDED LOW BACK PAIN WITHOUT SCIATICA: Primary | ICD-10-CM

## 2024-06-17 DIAGNOSIS — R53.81 OTHER MALAISE: ICD-10-CM

## 2024-06-17 LAB
ALBUMIN SERPL BCG-MCNC: 3.7 G/DL (ref 3.5–5.2)
ALBUMIN UR-MCNC: 30 MG/DL
ALP SERPL-CCNC: 155 U/L (ref 40–150)
ALT SERPL W P-5'-P-CCNC: 25 U/L (ref 0–70)
ANION GAP SERPL CALCULATED.3IONS-SCNC: 15 MMOL/L (ref 7–15)
APPEARANCE UR: CLEAR
AST SERPL W P-5'-P-CCNC: 19 U/L (ref 0–45)
BACTERIA #/AREA URNS HPF: ABNORMAL /HPF
BILIRUB SERPL-MCNC: 0.4 MG/DL
BILIRUB UR QL STRIP: NEGATIVE
BUN SERPL-MCNC: 30.7 MG/DL (ref 8–23)
CALCIUM SERPL-MCNC: 9 MG/DL (ref 8.8–10.2)
CHLORIDE SERPL-SCNC: 100 MMOL/L (ref 98–107)
COLOR UR AUTO: YELLOW
CREAT SERPL-MCNC: 1.44 MG/DL (ref 0.67–1.17)
CRP SERPL-MCNC: 12.4 MG/L
DEPRECATED HCO3 PLAS-SCNC: 23 MMOL/L (ref 22–29)
EGFRCR SERPLBLD CKD-EPI 2021: 49 ML/MIN/1.73M2
ERYTHROCYTE [DISTWIDTH] IN BLOOD BY AUTOMATED COUNT: 16.4 % (ref 10–15)
ERYTHROCYTE [SEDIMENTATION RATE] IN BLOOD BY WESTERGREN METHOD: 66 MM/HR (ref 0–20)
GLUCOSE SERPL-MCNC: 139 MG/DL (ref 70–99)
GLUCOSE UR STRIP-MCNC: NEGATIVE MG/DL
HCT VFR BLD AUTO: 32.6 % (ref 40–53)
HGB BLD-MCNC: 10.2 G/DL (ref 13.3–17.7)
HGB UR QL STRIP: ABNORMAL
KETONES UR STRIP-MCNC: NEGATIVE MG/DL
LEUKOCYTE ESTERASE UR QL STRIP: NEGATIVE
MCH RBC QN AUTO: 28.3 PG (ref 26.5–33)
MCHC RBC AUTO-ENTMCNC: 31.3 G/DL (ref 31.5–36.5)
MCV RBC AUTO: 90 FL (ref 78–100)
NITRATE UR QL: NEGATIVE
PH UR STRIP: 6 [PH] (ref 5–8)
PLATELET # BLD AUTO: 348 10E3/UL (ref 150–450)
POTASSIUM SERPL-SCNC: 4.3 MMOL/L (ref 3.4–5.3)
PROT SERPL-MCNC: 7.3 G/DL (ref 6.4–8.3)
RBC # BLD AUTO: 3.61 10E6/UL (ref 4.4–5.9)
RBC #/AREA URNS AUTO: ABNORMAL /HPF
SODIUM SERPL-SCNC: 138 MMOL/L (ref 135–145)
SP GR UR STRIP: 1.01 (ref 1–1.03)
SQUAMOUS #/AREA URNS AUTO: ABNORMAL /LPF
UROBILINOGEN UR STRIP-ACNC: 0.2 E.U./DL
WBC # BLD AUTO: 10.2 10E3/UL (ref 4–11)
WBC #/AREA URNS AUTO: ABNORMAL /HPF

## 2024-06-17 PROCEDURE — 96374 THER/PROPH/DIAG INJ IV PUSH: CPT | Mod: 59

## 2024-06-17 PROCEDURE — 85652 RBC SED RATE AUTOMATED: CPT | Performed by: EMERGENCY MEDICINE

## 2024-06-17 PROCEDURE — 36415 COLL VENOUS BLD VENIPUNCTURE: CPT | Performed by: EMERGENCY MEDICINE

## 2024-06-17 PROCEDURE — 99214 OFFICE O/P EST MOD 30 MIN: CPT | Performed by: PHYSICIAN ASSISTANT

## 2024-06-17 PROCEDURE — 86140 C-REACTIVE PROTEIN: CPT | Performed by: EMERGENCY MEDICINE

## 2024-06-17 PROCEDURE — 999N000104 CT LUMBAR SPINE RECONSTRUCTED

## 2024-06-17 PROCEDURE — 80053 COMPREHEN METABOLIC PANEL: CPT | Performed by: EMERGENCY MEDICINE

## 2024-06-17 PROCEDURE — 250N000013 HC RX MED GY IP 250 OP 250 PS 637: Performed by: EMERGENCY MEDICINE

## 2024-06-17 PROCEDURE — 99285 EMERGENCY DEPT VISIT HI MDM: CPT | Mod: 25

## 2024-06-17 PROCEDURE — 85027 COMPLETE CBC AUTOMATED: CPT | Performed by: EMERGENCY MEDICINE

## 2024-06-17 PROCEDURE — 250N000011 HC RX IP 250 OP 636: Mod: JZ | Performed by: EMERGENCY MEDICINE

## 2024-06-17 PROCEDURE — 74177 CT ABD & PELVIS W/CONTRAST: CPT

## 2024-06-17 PROCEDURE — 87086 URINE CULTURE/COLONY COUNT: CPT | Performed by: PHYSICIAN ASSISTANT

## 2024-06-17 PROCEDURE — 81001 URINALYSIS AUTO W/SCOPE: CPT | Performed by: PHYSICIAN ASSISTANT

## 2024-06-17 RX ORDER — MORPHINE SULFATE 4 MG/ML
4 INJECTION, SOLUTION INTRAMUSCULAR; INTRAVENOUS ONCE
Status: COMPLETED | OUTPATIENT
Start: 2024-06-17 | End: 2024-06-17

## 2024-06-17 RX ORDER — IOPAMIDOL 755 MG/ML
80 INJECTION, SOLUTION INTRAVASCULAR ONCE
Status: COMPLETED | OUTPATIENT
Start: 2024-06-17 | End: 2024-06-17

## 2024-06-17 RX ORDER — CYCLOBENZAPRINE HCL 10 MG
10 TABLET ORAL ONCE
Status: COMPLETED | OUTPATIENT
Start: 2024-06-17 | End: 2024-06-17

## 2024-06-17 RX ADMIN — CYCLOBENZAPRINE 10 MG: 10 TABLET, FILM COATED ORAL at 17:56

## 2024-06-17 RX ADMIN — IOPAMIDOL 80 ML: 755 INJECTION, SOLUTION INTRAVENOUS at 18:51

## 2024-06-17 RX ADMIN — MORPHINE SULFATE 4 MG: 4 INJECTION, SOLUTION INTRAMUSCULAR; INTRAVENOUS at 22:00

## 2024-06-17 ASSESSMENT — COLUMBIA-SUICIDE SEVERITY RATING SCALE - C-SSRS
2. HAVE YOU ACTUALLY HAD ANY THOUGHTS OF KILLING YOURSELF IN THE PAST MONTH?: NO
1. IN THE PAST MONTH, HAVE YOU WISHED YOU WERE DEAD OR WISHED YOU COULD GO TO SLEEP AND NOT WAKE UP?: NO

## 2024-06-17 ASSESSMENT — ACTIVITIES OF DAILY LIVING (ADL)
ADLS_ACUITY_SCORE: 39

## 2024-06-17 NOTE — ED NOTES
"Pt reported pain 6.5/10 at rest and a 7.5 with movement. States has had back pain for 50 years, but this pain significantly increased early Sunday morning at 1 AM. Reports pain on right mid back. States the pain \"feels like an ice pick\" and is sharp. Noted increased frequency urinating (voiding once every hour), not emptying the bladder fully, and dribbling. Noted stool has been softer but no constipation or diarrhea. No loss of control of bowel or bladder.  "

## 2024-06-17 NOTE — ED NOTES
Expected Patient Referral to ED  4:45 PM    Referring Clinic/Provider:  [facility]    Reason for referral/Clinical facts:  Recent admission for UTI, now with right flank pain, UA in clinic normal    Recommendations provided:  Send to ED for further evaluation    Caller was informed that this institution does possess the capabilities and/or resources to provide for patient and should be transferred to our facility.    Discussed that if direct admit is sought and any hurdles are encountered, this ED would be happy to see the patient and evaluate.    Informed caller that recommendations provided are recommendations based only on the facts provided and that they responsible to accept or reject the advice, or to seek a formal in person consultation as needed and that this ED will see/treat patient should they arrive.      Gemini Long MD  [facility] EMERGENCY ROOM  [address] [id number]  [phone]       Gemini Long MD  06/17/24 6029

## 2024-06-17 NOTE — PROGRESS NOTES
Patient presents with:  Back Pain: Back pain since Friday pt had a uti last month not sure if he has one or not frequent urination       Clinical Decision Making:  Right lower back pain of unknown origin. It is slightly pleuritic in nature. UA is neg here in clinic, but given broadness of possible causes including musculoskeletal, vs kidney, or inner organ based I recommend advanced imaging and baseline labs. Report given to ED prior to patient's arrival. UC was added to confirm. Patient mentioned wanting muscle relaxant, but he had recent fall one month ago and is in assisted living, so I think using one would be too high of a risk.     Potentially incidentally I was hearing skipped beats with cardiac exam. Lungs CTA.       ICD-10-CM    1. Acute right-sided low back pain without sciatica  M54.50 UA Macroscopic with reflex to Microscopic and Culture - Clinic Collect     UA Microscopic with Reflex to Culture     Urine Culture Aerobic Bacterial      2. Other malaise  R53.81 Urine Culture Aerobic Bacterial          There are no Patient Instructions on file for this visit.    HPI:  Lance Ibrahim is a 79 year old male with PMHx of recent urosepsis, DM2, CAD, CKD, HTN who presents today with concerns of back pain x 5 days. Patient had hospitalization on 5/16/2024-5/22/2024 due to severe sepsis related to UTI caused by Klebsiella. Patient was on IV Vanco, Levaquin, Cefepime, and Ceftriaxone in the hospital. He was discharged on oral Cefdinir and Levaquin. Patient's glucose has been higher than normal in the past week, but he may be eating more. He did have some trigger point injections from [facility] pain clinic Friday his pain was the res patient's last blood glucose was 289 5 hours ago. He was not been getting his insulin at his care facility. He has been spending a lot more time laying down in the past 3 weeks due to recent illness. He is not on any anticoag. He reports hx of right leg edema, but no exacerbation of  his. He reports about a week ago he had cough with lots of chest congestion. His family gave him Mucinex and he reports improvement in his breathing clearity. This right lower back pain is slightly pleuritic in nature, but is also constantly 6/10. He has hx of diabetic neuropathy with an implantable device, which successfully prevents leg pain. He also has hx of nephrolithiasis, but this pain does not feel similar. He also reports history of a back nerve ablation procedure that was helpful and effective, but isn't working now.     History obtained from the patient.    Problem List:  2024-05: Bacteremia  2024-05: Acute cystitis without hematuria  2024-05: Hypoxia  2024-05: Fall, initial encounter  2024-04: Abdominal pain  2024-04: Hyperglycemia  2024-04: Elevated LFTs  2024-04: Elevated lipase  2024-04: Diffuse papular rash  2024-04: Enlarged prostate  2023-08: Coronary artery disease involving native coronary artery of   native heart without angina pectoris  2023-08: Chronic systolic congestive heart failure (H)  2023-08: Osteomyelitis of ankle and foot (H)  2023-07: Cellulitis and abscess of foot excluding toe  2023-07: Diabetic ulcer of toe of right foot associated with diabetes   mellitus due to underlying condition, with necrosis of bone (H)  2023-02: Slowing of urinary stream  2023-02: Nocturia  2023-01: Diabetic ulcer of toe of right foot associated with diabetes   mellitus due to underlying condition, limited to breakdown of skin (H)  2022-12: Muscle pain  2022-10: Diabetic neuropathy associated with type 2 diabetes mellitus   (H)  2022-10: 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  2022-08: Cellulitis of right lower extremity  2022-01: H/O amputation of lesser toe, left (H24)  2021-12: Acute idiopathic gout of right hand  2021-12: Other constipation  2021-12: Acute renal insufficiency  2021-12: Acute right-sided thoracic back pain  2021-07: Ulcerated, foot, unspecified laterality, limited to   breakdown of skin  (H)  2020-07: Ulcerated, foot (H)  2020-02: Obesity (BMI 35.0-39.9) with comorbidity (H)  2020-02: Stage 3b chronic kidney disease (H)  2019-03: Shortness of breath  2019-03: Abnormal stress test  2019-03: Renal colic  2019-02: Dyspnea on exertion  2019-02: Chest pain, unspecified type  2019-02: Cardiomyopathy, unspecified type (H)  2019-02: Acute chest pain  2019-02: Dyspnea  2018-12: Skin rash  2018-12: Total knee replacement status  2018-11: Amputated toe, left (H24)  2018-10: Diabetic foot infection (H)  2018-06: Cellulitis  2018-05: Severe sepsis (H)  2017-02: Cervicalgia  2016-04: Lower urinary tract symptoms  2016-02: Benign essential hypertension  2015-10: Obesity  2015-10: Diabetes mellitus type 2, uncomplicated (H)  2013-07: Type II diabetes mellitus (H)  2013-07: Gouty arthropathy  2013-02: Type 2 diabetes, HbA1c goal < 7% (H)  2012-07: Gout  2011-12: Advanced directives, counseling/discussion  2010-10: CARDIOVASCULAR SCREENING; LDL GOAL LESS THAN 100  2008-07: Hypertension  2008-07: HTN (hypertension)  2004-07: Chronic rhinitis  2004-07: Hypertrophy of prostate with urinary obstruction  2004-07: Transient cerebral ischemia  2004-06: Ventral hernia  2000-01: Chronic low back pain      Past Medical History:   Diagnosis Date    Anemia     Arthritis     osteoarthritis knees    BPH     CAD (coronary artery disease)     subtotal occlusion in the small distal LAD     Cardiomyopathy     Cerebral artery occlusion with cerebral infarction     TIA 1993, no residual    Cervicalgia     CHF (congestive heart failure) (H)     Chronic kidney disease     Chronic low back pain     Chronic rhinitis     Gouty arthropathy     hands    Hyperlipidemia     Hypertension     Kidney stone     Nocturia     Obesity     Osteomyelitis (H) 08/2023    Foot    PVD (peripheral vascular disease)     Sleep apnea     doesn't use cpap    TIA (transient ischaemic attack) 1993    Type 2 diabetes mellitus - 11/08/2018    Ureteral stone         Social History     Tobacco Use    Smoking status: Former     Current packs/day: 0.00     Types: Cigarettes     Quit date: 3/17/1993     Years since quittin.2    Smokeless tobacco: Never   Substance Use Topics    Alcohol use: Not Currently       Review of Systems    Vitals:    24 1609   BP: 131/76   Pulse: 97   Resp: 18   Temp: 98.6  F (37  C)   TempSrc: Oral   SpO2: 100%       Physical Exam  Vitals and nursing note reviewed.   Constitutional:       General: He is not in acute distress.     Appearance: He is not toxic-appearing or diaphoretic.      Comments: Seated in a wheelchair   HENT:      Head: Normocephalic and atraumatic.      Right Ear: External ear normal.      Left Ear: External ear normal.   Eyes:      Conjunctiva/sclera: Conjunctivae normal.   Cardiovascular:      Rate and Rhythm: Rhythm irregular.      Comments: Normal rate. Occational skipped beat  Pulmonary:      Effort: Pulmonary effort is normal. No respiratory distress.   Neurological:      Mental Status: He is alert.   Psychiatric:         Mood and Affect: Mood normal.         Behavior: Behavior normal.         Thought Content: Thought content normal.         Judgment: Judgment normal.         Results:  Results for orders placed or performed in visit on 24   UA Macroscopic with reflex to Microscopic and Culture - Clinic Collect     Status: Abnormal    Specimen: Urine, Clean Catch   Result Value Ref Range    Color Urine Yellow Colorless, Straw, Light Yellow, Yellow    Appearance Urine Clear Clear    Glucose Urine Negative Negative mg/dL    Bilirubin Urine Negative Negative    Ketones Urine Negative Negative mg/dL    Specific Gravity Urine 1.015 1.005 - 1.030    Blood Urine Trace (A) Negative    pH Urine 6.0 5.0 - 8.0    Protein Albumin Urine 30 (A) Negative mg/dL    Urobilinogen Urine 0.2 0.2, 1.0 E.U./dL    Nitrite Urine Negative Negative    Leukocyte Esterase Urine Negative Negative   UA Microscopic with Reflex to Culture      Status: Abnormal   Result Value Ref Range    Bacteria Urine Few (A) None Seen /HPF    RBC Urine 0-2 0-2 /HPF /HPF    WBC Urine 0-5 0-5 /HPF /HPF    Squamous Epithelials Urine Few (A) None Seen /LPF    Narrative    Urine Culture not indicated         At the end of the encounter, I discussed results, diagnosis, medications. Discussed red flags for immediate return to clinic/ER, as well as indications for follow up if no improvement. Patient understood and agreed to plan. Patient was stable for discharge.

## 2024-06-17 NOTE — ED TRIAGE NOTES
Pt reports R sided back pain starting Wednesday. Seen at [facility] and had trigger point injection and negative U/A which helped with pain until Saturday night when pain returned and since Saturday unable to sleep d/t pain. Pt reports Pain above R hip close to the spine. Does not think this is muscle pain since lidocaine not giving any relief. Pt ambulatory with slow painful gait. Pt alert.     Triage Assessment (Adult)       Row Name 06/17/24 2624          Triage Assessment    Airway WDL WDL        Respiratory WDL    Respiratory WDL WDL        Skin Circulation/Temperature WDL    Skin Circulation/Temperature WDL WDL        Cardiac WDL    Cardiac WDL WDL        Peripheral/Neurovascular WDL    Peripheral Neurovascular WDL WDL        Cognitive/Neuro/Behavioral WDL    Cognitive/Neuro/Behavioral WDL WDL

## 2024-06-17 NOTE — ED PROVIDER NOTES
EMERGENCY DEPARTMENT ENCOUNTER      NAME: Lance Ibrahim  AGE: 79 year old male  YOB: 1945  MRN: [id number]  EVALUATION DATE & TIME: No admission date for patient encounter.    ED PROVIDER: Jena Cardona MD    Chief Complaint   Patient presents with    Back Pain       FINAL IMPRESSION  1. Low back pain with right-sided sciatica, unspecified back pain laterality, unspecified chronicity    2. Discitis of lumbar region        MEDICAL DECISION MAKING   Pertinent Labs & Imaging studies reviewed. (See chart for details)    Lance Ibrahim is a 79 year old male who presents for evaluation of low back pain.  Records reviewed.  Patient has a history of chronic back pain, type 2 diabetes complicated by neuropathy, CKD, CHF, CAD, UTI, and gout.  He was seen at urgent care earlier today for evaluation of low back pain and I reviewed that note.  He was also relatively recently admitted for acute on chronic low back pain and complicated UTI with bacteremia.  He was seen in pain clinic on 1945 and had a trigger point injection for management of his back pain and reports transient improvement.  Today, he presents with worsening point tenderness to the right lumbar paraspinal muscles.  He reports associated radiation of pain down his right lower extremity, intermittently.  he reports that he has chronic weakness in his left lower extremity that he attributes to needing a joint replacement.  He  Does believe that his right lower extremity is more weak than baseline as well.  He denies saddle anesthesia.  In clinic today, urine did not appear consistent with infection.  He has not had any new fall or trauma.  No complaints of fever, abdominal pain, nausea, vomiting, or other new symptoms of infection or illness.    I considered a broad differential including but not limited to muscular strain/sprain, degenerative joint and disc disease, acute disc herniation, discitis, sciatica, fracture, epidural abscess, tumor/mass,  cauda equina syndrome, vertebral osteomyelitis, nephrolithiasis, pyelonephritis.  Discussed options for workup and management with the patient.  We have agreed on plan for labs, CT abdomen/pelvis, CT of thoracic spine.  Will manage symptoms with IV analgesic/antiemetic.      ED Course as of 06/17/24 2330   Mon Jun 17, 2024 1823 Comprehensive metabolic panel(!)  CMP unremarkable.  Creatinine 1.44, appears to be at baseline.  No new electrolyte derangement, acidosis, or evidence of hepatobiliary disease.   1824 CBC (+ platelets, no diff)(!)  CBC without leukocytosis.  Hemoglobin 10.2, appears to be at baseline or slightly improved.   1824 CRP Inflammation(!): 12.40  Elevated but of unclear etiology.  Possibly related to infectious/inflammatory process.   1906 Sed Rate(!): 66  Elevated and consistent with infection/inflammatory process but unclear etiology.   2027 CT Abdomen Pelvis w Contrast  CT abdomen/pelvis without evidence of ureterolithiasis, hydronephrosis, diverticulitis, or obstruction.  Common bile duct stent in place with slight dilatation of Intermatic bile ducts but patient without tenderness palpation of right upper quadrant or other signs or symptoms to suggest hepatobiliary disease.  No acute process to explain symptoms.   2028 CT Lumbar Spine Reconstructed  CT of lumbar spine revealed evidence of possible discitis/osteomyelitis at L4-L5 with effacement of the ventral prevertebral fat at L4-S1 and effacement of bilateral infra foraminal fat at same level concerning for concurrent phlegmon versus abscess.     Workup today was notable for the above.  History and exam does seem concerning for discitis versus osteomyelitis and potentially, epidural abscess/phlegmon.  Patient has remained hemodynamically stable.  Did have some improvement in discomfort after initial interventions.  I had multiple discussions with neurosurgery team and we agree that this would likely be managed by infectious disease but  given his report of increasing weakness, they would like to see him as well.  Unfortunately, we do not have any beds available here at [facility] and I do believe patient would benefit from transfer to a higher level of care where he can be seen by ID as well as neurosurgery.   I updated the patient and updated he and his son with results as well as recommendations from specialist.  He was comfortable with plan for transfer.  I spoke with the hospitalist at [facility] who graciously agreed to facilitate transfer.  Patient is going to work on getting his remote control to turn off his spinal stimulator so that hopefully, an MRI can be obtained tomorrow.          Considerations in Medical Decision Making  History:  Supplemental history from: N/A  External Record(s) reviewed: Outpatient Record: 06/17/2024 [facility] Visit    Work Up:  Chart documentation includes differential considered and any EKGs or imaging independently interpreted by provider, where specified.  In additional to work up documented, I considered the following work up: Documented in chart, if applicable.    External consultation:  Discussion of management with another provider: Documented in chart, if applicable    Complicating factors:  Care impacted by chronic illness: Diabetes, Hypertension, and Other: Diabetic neuropathy  Care affected by social determinants of health: Access to care; Access to Medical Care and No Support for Care at Home    Disposition considerations:  Transfer for admission to [facility]      ED COURSE  5:31 PM I met with the patient, obtained history, performed an initial exam, and discussed options and plan for diagnostics and treatment here in the ED.   7:52 PM I rechecked on the patient and spoke with them about the plan going forward.   8:15 PM I spoke with jordy Rangel, about the patient and the plan going forward.  8:32 PM I again spoke with jordy Rangel, about the patient and  "the plan going forward.  8:49 PM I spoke with Claire, neurosurgery, about the patient. Going to update patient.   10:47 PM I spoke with Dr. Portillo, hospitalist, about the patient and the plan going forward.      MEDICATIONS GIVEN IN THE ED  Medications   cyclobenzaprine (FLEXERIL) tablet 10 mg (10 mg Oral $Given 6/17/24 9847)   iopamidol (ISOVUE-370) solution 80 mL (80 mLs Intravenous $Given 6/17/24 5791)   morphine (PF) injection 4 mg (4 mg Intravenous $Given 6/17/24 2200)       NEW PRESCRIPTIONS STARTED AT TODAY'S VISIT  New Prescriptions    No medications on file          =================================================================    HPI:    Patient information was obtained from: the patient    Use of : N/A      Lance Ibrahim is a 79 year old male who presents to this emergency department for evaluation of back pain.    The patient had a new onset of right-sided back pain starting on the night of 06/12. He states that it \"felt as if someone kicked me with a sharp cowboy boot\". He denies any injuries or falls prior to the onset of his pain. On 06/14, the patient went to [facility] and got a trigger point injection. This did improve is pain somewhat. However, on the night of 06/15, the patient's back pain got worse. Currently, he rates it at a 6.5/10 and says that it has been constant since Saturday. The pain radiates down into his right leg when bearing weight on that leg.     No dysuria, hematuria, nausea, constipation, diarrhea, new leg swelling, new leg weakness or new numbness or tingling.     Of note, the patient was diagnosed with a urinary tract infection about one month ago. Additionally, he had kidney stones about 20 years ago, but says that his current symptoms are not consistent with this. He lives at an assisted living facility.     Per Chart Review, the patient was seen on 06/17/2024 at [facility] for evaluation of right lower back " pain. Urinalysis was negative. However, advanced imaging and labs were recommended due to broad differential diagnosis. A urine culture was added later on.       RELEVANT HISTORY, MEDICATIONS, & ALLERGIES   Past medical history, surgical history, family history, medications, and allergies reviewed and pertinent noted in HPI.    REVIEW OF SYSTEMS:  A complete review of systems was performed with pertinent positives and negatives noted in the HPI. All other systems negative.     PHYSICAL EXAM:    Vitals: BP (!) 140/63   Pulse 85   Temp 97.2  F (36.2  C) (Temporal)   Resp 16   Ht 1.829 m (6')   Wt 100.7 kg (222 lb)   SpO2 91%   BMI 30.11 kg/m     General: Alert and interactive, comfortable appearing.  Examined in wheelchair.  HENT: Atraumatic. Full AROM of neck. MMM.  Cardiovascular: Regular rate and rhythm.   Chest/Pulmonary: Normal work of breathing. Speaking in complete sentences. Lungs CTAB. No chest wall tenderness or deformities.  Abdomen: Soft, nondistended. Nontender without guarding or rebound.  Back: Point tenderness to palpation just to right of midline at lower lumbar spine.  No clear midline tenderness, step-offs, or deformities.  Extremities: Normal AROM of all major joints.  Skin: Warm and dry. Normal skin color.   Neuro: Speech clear. CNs grossly intact.  Increasing pain with right hip flexion.  4/5 strength with right hip flexion, unable to perform on left secondary to chronic weakness.  5/5 strength bilateral dorsiflexion.  Psych: Normal affect/mood, cooperative, memory appropriate.      LAB  Labs Ordered and Resulted from Time of ED Arrival to Time of ED Departure   COMPREHENSIVE METABOLIC PANEL - Abnormal       Result Value    Sodium 138      Potassium 4.3      Carbon Dioxide (CO2) 23      Anion Gap 15      Urea Nitrogen 30.7 (*)     Creatinine 1.44 (*)     GFR Estimate 49 (*)     Calcium 9.0      Chloride 100      Glucose 139 (*)     Alkaline Phosphatase 155 (*)     AST 19      ALT 25       Protein Total 7.3      Albumin 3.7      Bilirubin Total 0.4     CBC WITH PLATELETS - Abnormal    WBC Count 10.2      RBC Count 3.61 (*)     Hemoglobin 10.2 (*)     Hematocrit 32.6 (*)     MCV 90      MCH 28.3      MCHC 31.3 (*)     RDW 16.4 (*)     Platelet Count 348     CRP INFLAMMATION - Abnormal    CRP Inflammation 12.40 (*)    ERYTHROCYTE SEDIMENTATION RATE AUTO - Abnormal    Erythrocyte Sedimentation Rate 66 (*)        RADIOLOGY  CT Lumbar Spine Reconstructed   Final Result   IMPRESSION:   1.  Findings concerning for discitis-osteomyelitis at L4-L5 with epidural/intraforaminal phlegmon versus abscess at L4-S1, as described.   2.  If not clinically contraindicated, a contrast-enhanced lumbar spine MRI is recommended for better assessment.      CT Abdomen Pelvis w Contrast   Final Result   IMPRESSION:    1.  No renal calculi or hydronephrosis. Left renal cyst.   2.  Common bile duct stent with slight dilatation of the intrahepatic bile ducts. A tiny portion of the distal stent appears fractured, of doubtful clinical significance.    3.  Left colonic diverticulosis.   4.  Degenerative disc and facet disease lumbar spine.   5.  Advanced atherosclerotic disease.          I, Elin Grier, am serving as a scribe to document services personally performed by Dr. Jena Cardona based on my observation and the provider's statements to me. I, Jena Cardona MD attest that Elin Grier is acting in a scribe capacity, has observed my performance of the services and has documented them in accordance with my direction.    Jena Cardona M.D.  Emergency Medicine  [facility] EMERGENCY ROOM  [address] [id number] [phone]  Dept: [phone]     Jena Cardona MD  06/17/24 3415

## 2024-06-18 ENCOUNTER — TELEPHONE (OUTPATIENT)
Dept: FAMILY MEDICINE | Facility: CLINIC | Age: 79
End: 2024-06-18

## 2024-06-18 ENCOUNTER — HOSPITAL ENCOUNTER (INPATIENT)
Facility: CLINIC | Age: 79
LOS: 7 days | Discharge: NURSING, REHAB OR LONG-TERM CARE | DRG: 477 | End: 2024-06-25
Attending: STUDENT IN AN ORGANIZED HEALTH CARE EDUCATION/TRAINING PROGRAM | Admitting: HOSPITALIST
Payer: COMMERCIAL

## 2024-06-18 ENCOUNTER — APPOINTMENT (OUTPATIENT)
Dept: MRI IMAGING | Facility: CLINIC | Age: 79
DRG: 477 | End: 2024-06-18
Attending: HOSPITALIST
Payer: COMMERCIAL

## 2024-06-18 VITALS
DIASTOLIC BLOOD PRESSURE: 57 MMHG | HEART RATE: 76 BPM | BODY MASS INDEX: 30.07 KG/M2 | WEIGHT: 222 LBS | SYSTOLIC BLOOD PRESSURE: 120 MMHG | OXYGEN SATURATION: 96 % | RESPIRATION RATE: 16 BRPM | TEMPERATURE: 97.2 F | HEIGHT: 72 IN

## 2024-06-18 DIAGNOSIS — E08.621 DIABETIC ULCER OF TOE OF RIGHT FOOT ASSOCIATED WITH DIABETES MELLITUS DUE TO UNDERLYING CONDITION, WITH NECROSIS OF BONE (H): ICD-10-CM

## 2024-06-18 DIAGNOSIS — E11.628 TYPE 2 DIABETES MELLITUS WITH OTHER SKIN COMPLICATION, WITHOUT LONG-TERM CURRENT USE OF INSULIN (H): ICD-10-CM

## 2024-06-18 DIAGNOSIS — D50.9 IRON DEFICIENCY ANEMIA, UNSPECIFIED IRON DEFICIENCY ANEMIA TYPE: ICD-10-CM

## 2024-06-18 DIAGNOSIS — L97.514 DIABETIC ULCER OF TOE OF RIGHT FOOT ASSOCIATED WITH DIABETES MELLITUS DUE TO UNDERLYING CONDITION, WITH NECROSIS OF BONE (H): ICD-10-CM

## 2024-06-18 DIAGNOSIS — M46.46 LUMBAR DISCITIS: Primary | ICD-10-CM

## 2024-06-18 DIAGNOSIS — M54.41 ACUTE RIGHT-SIDED LOW BACK PAIN WITH RIGHT-SIDED SCIATICA: ICD-10-CM

## 2024-06-18 DIAGNOSIS — K59.00 CONSTIPATION, UNSPECIFIED CONSTIPATION TYPE: ICD-10-CM

## 2024-06-18 PROBLEM — M54.9 BACK PAIN: Status: ACTIVE | Noted: 2024-06-18

## 2024-06-18 LAB
ANION GAP SERPL CALCULATED.3IONS-SCNC: 13 MMOL/L (ref 7–15)
BUN SERPL-MCNC: 28.5 MG/DL (ref 8–23)
CALCIUM SERPL-MCNC: 9.1 MG/DL (ref 8.8–10.2)
CHLORIDE SERPL-SCNC: 99 MMOL/L (ref 98–107)
CREAT SERPL-MCNC: 1.25 MG/DL (ref 0.67–1.17)
CRP SERPL-MCNC: 10.61 MG/L
DEPRECATED HCO3 PLAS-SCNC: 25 MMOL/L (ref 22–29)
EGFRCR SERPLBLD CKD-EPI 2021: 58 ML/MIN/1.73M2
ERYTHROCYTE [DISTWIDTH] IN BLOOD BY AUTOMATED COUNT: 16.2 % (ref 10–15)
ERYTHROCYTE [SEDIMENTATION RATE] IN BLOOD BY WESTERGREN METHOD: 69 MM/HR (ref 0–20)
GLUCOSE BLDC GLUCOMTR-MCNC: 117 MG/DL (ref 70–99)
GLUCOSE BLDC GLUCOMTR-MCNC: 120 MG/DL (ref 70–99)
GLUCOSE BLDC GLUCOMTR-MCNC: 143 MG/DL (ref 70–99)
GLUCOSE BLDC GLUCOMTR-MCNC: 209 MG/DL (ref 70–99)
GLUCOSE SERPL-MCNC: 214 MG/DL (ref 70–99)
HBA1C MFR BLD: 8 %
HCT VFR BLD AUTO: 31.4 % (ref 40–53)
HGB BLD-MCNC: 9.7 G/DL (ref 13.3–17.7)
MCH RBC QN AUTO: 29 PG (ref 26.5–33)
MCHC RBC AUTO-ENTMCNC: 30.9 G/DL (ref 31.5–36.5)
MCV RBC AUTO: 94 FL (ref 78–100)
PLATELET # BLD AUTO: 284 10E3/UL (ref 150–450)
POTASSIUM SERPL-SCNC: 4.1 MMOL/L (ref 3.4–5.3)
RBC # BLD AUTO: 3.35 10E6/UL (ref 4.4–5.9)
SODIUM SERPL-SCNC: 137 MMOL/L (ref 135–145)
WBC # BLD AUTO: 8.2 10E3/UL (ref 4–11)

## 2024-06-18 PROCEDURE — 85652 RBC SED RATE AUTOMATED: CPT | Performed by: PHYSICIAN ASSISTANT

## 2024-06-18 PROCEDURE — 86140 C-REACTIVE PROTEIN: CPT | Performed by: PHYSICIAN ASSISTANT

## 2024-06-18 PROCEDURE — 85027 COMPLETE CBC AUTOMATED: CPT | Performed by: HOSPITALIST

## 2024-06-18 PROCEDURE — 250N000011 HC RX IP 250 OP 636: Mod: JZ | Performed by: HOSPITALIST

## 2024-06-18 PROCEDURE — 36415 COLL VENOUS BLD VENIPUNCTURE: CPT | Performed by: PHYSICIAN ASSISTANT

## 2024-06-18 PROCEDURE — 99207 PR APP CREDIT; MD BILLING SHARED VISIT: CPT | Performed by: PHYSICIAN ASSISTANT

## 2024-06-18 PROCEDURE — A9585 GADOBUTROL INJECTION: HCPCS | Performed by: HOSPITALIST

## 2024-06-18 PROCEDURE — 250N000012 HC RX MED GY IP 250 OP 636 PS 637: Performed by: HOSPITALIST

## 2024-06-18 PROCEDURE — 72158 MRI LUMBAR SPINE W/O & W/DYE: CPT

## 2024-06-18 PROCEDURE — 36415 COLL VENOUS BLD VENIPUNCTURE: CPT | Performed by: HOSPITALIST

## 2024-06-18 PROCEDURE — 120N000001 HC R&B MED SURG/OB

## 2024-06-18 PROCEDURE — 250N000013 HC RX MED GY IP 250 OP 250 PS 637: Performed by: HOSPITALIST

## 2024-06-18 PROCEDURE — 99233 SBSQ HOSP IP/OBS HIGH 50: CPT | Performed by: HOSPITALIST

## 2024-06-18 PROCEDURE — 83036 HEMOGLOBIN GLYCOSYLATED A1C: CPT | Performed by: HOSPITALIST

## 2024-06-18 PROCEDURE — 99418 PROLNG IP/OBS E/M EA 15 MIN: CPT | Performed by: HOSPITALIST

## 2024-06-18 PROCEDURE — 250N000013 HC RX MED GY IP 250 OP 250 PS 637: Performed by: PHYSICIAN ASSISTANT

## 2024-06-18 PROCEDURE — 99222 1ST HOSP IP/OBS MODERATE 55: CPT | Performed by: INTERNAL MEDICINE

## 2024-06-18 PROCEDURE — 87040 BLOOD CULTURE FOR BACTERIA: CPT | Performed by: PHYSICIAN ASSISTANT

## 2024-06-18 PROCEDURE — 80048 BASIC METABOLIC PNL TOTAL CA: CPT | Performed by: HOSPITALIST

## 2024-06-18 PROCEDURE — 255N000002 HC RX 255 OP 636: Performed by: HOSPITALIST

## 2024-06-18 RX ORDER — LIDOCAINE 40 MG/G
CREAM TOPICAL
Status: DISCONTINUED | OUTPATIENT
Start: 2024-06-18 | End: 2024-06-25 | Stop reason: HOSPADM

## 2024-06-18 RX ORDER — NALOXONE HYDROCHLORIDE 0.4 MG/ML
0.4 INJECTION, SOLUTION INTRAMUSCULAR; INTRAVENOUS; SUBCUTANEOUS
Status: DISCONTINUED | OUTPATIENT
Start: 2024-06-18 | End: 2024-06-25 | Stop reason: HOSPADM

## 2024-06-18 RX ORDER — DEXTROSE MONOHYDRATE 25 G/50ML
25-50 INJECTION, SOLUTION INTRAVENOUS
Status: DISCONTINUED | OUTPATIENT
Start: 2024-06-18 | End: 2024-06-25 | Stop reason: HOSPADM

## 2024-06-18 RX ORDER — AMOXICILLIN 250 MG
2 CAPSULE ORAL 2 TIMES DAILY PRN
Status: DISCONTINUED | OUTPATIENT
Start: 2024-06-18 | End: 2024-06-25 | Stop reason: HOSPADM

## 2024-06-18 RX ORDER — AMOXICILLIN 250 MG
1 CAPSULE ORAL 2 TIMES DAILY PRN
Status: DISCONTINUED | OUTPATIENT
Start: 2024-06-18 | End: 2024-06-25 | Stop reason: HOSPADM

## 2024-06-18 RX ORDER — CYCLOBENZAPRINE HCL 10 MG
10 TABLET ORAL 3 TIMES DAILY
Status: DISCONTINUED | OUTPATIENT
Start: 2024-06-18 | End: 2024-06-19

## 2024-06-18 RX ORDER — HYDROMORPHONE HCL IN WATER/PF 6 MG/30 ML
0.2 PATIENT CONTROLLED ANALGESIA SYRINGE INTRAVENOUS
Status: DISCONTINUED | OUTPATIENT
Start: 2024-06-18 | End: 2024-06-19

## 2024-06-18 RX ORDER — ACETAMINOPHEN 650 MG/1
650 SUPPOSITORY RECTAL EVERY 4 HOURS PRN
Status: DISCONTINUED | OUTPATIENT
Start: 2024-06-18 | End: 2024-06-25 | Stop reason: HOSPADM

## 2024-06-18 RX ORDER — ONDANSETRON 4 MG/1
4 TABLET, ORALLY DISINTEGRATING ORAL EVERY 6 HOURS PRN
Status: DISCONTINUED | OUTPATIENT
Start: 2024-06-18 | End: 2024-06-25 | Stop reason: HOSPADM

## 2024-06-18 RX ORDER — NICOTINE POLACRILEX 4 MG
15-30 LOZENGE BUCCAL
Status: DISCONTINUED | OUTPATIENT
Start: 2024-06-18 | End: 2024-06-25 | Stop reason: HOSPADM

## 2024-06-18 RX ORDER — NALOXONE HYDROCHLORIDE 0.4 MG/ML
0.2 INJECTION, SOLUTION INTRAMUSCULAR; INTRAVENOUS; SUBCUTANEOUS
Status: DISCONTINUED | OUTPATIENT
Start: 2024-06-18 | End: 2024-06-25 | Stop reason: HOSPADM

## 2024-06-18 RX ORDER — ACETAMINOPHEN 325 MG/1
650 TABLET ORAL EVERY 4 HOURS PRN
Status: DISCONTINUED | OUTPATIENT
Start: 2024-06-18 | End: 2024-06-25 | Stop reason: HOSPADM

## 2024-06-18 RX ORDER — GADOBUTROL 604.72 MG/ML
9 INJECTION INTRAVENOUS ONCE
Status: COMPLETED | OUTPATIENT
Start: 2024-06-18 | End: 2024-06-18

## 2024-06-18 RX ORDER — CALCIUM CARBONATE 500 MG/1
1000 TABLET, CHEWABLE ORAL 4 TIMES DAILY PRN
Status: DISCONTINUED | OUTPATIENT
Start: 2024-06-18 | End: 2024-06-25 | Stop reason: HOSPADM

## 2024-06-18 RX ORDER — HYDROMORPHONE HCL IN WATER/PF 6 MG/30 ML
0.4 PATIENT CONTROLLED ANALGESIA SYRINGE INTRAVENOUS
Status: DISCONTINUED | OUTPATIENT
Start: 2024-06-18 | End: 2024-06-19

## 2024-06-18 RX ORDER — ONDANSETRON 2 MG/ML
4 INJECTION INTRAMUSCULAR; INTRAVENOUS EVERY 6 HOURS PRN
Status: DISCONTINUED | OUTPATIENT
Start: 2024-06-18 | End: 2024-06-25 | Stop reason: HOSPADM

## 2024-06-18 RX ORDER — OXYCODONE HYDROCHLORIDE 5 MG/1
5 TABLET ORAL EVERY 4 HOURS PRN
Status: DISCONTINUED | OUTPATIENT
Start: 2024-06-18 | End: 2024-06-25 | Stop reason: HOSPADM

## 2024-06-18 RX ADMIN — HYDROMORPHONE HYDROCHLORIDE 0.2 MG: 0.2 INJECTION, SOLUTION INTRAMUSCULAR; INTRAVENOUS; SUBCUTANEOUS at 18:24

## 2024-06-18 RX ADMIN — ACETAMINOPHEN 650 MG: 325 TABLET, FILM COATED ORAL at 17:30

## 2024-06-18 RX ADMIN — OXYCODONE HYDROCHLORIDE 5 MG: 5 TABLET ORAL at 17:29

## 2024-06-18 RX ADMIN — OXYCODONE HYDROCHLORIDE 2.5 MG: 5 TABLET ORAL at 13:28

## 2024-06-18 RX ADMIN — INSULIN ASPART 1 UNITS: 100 INJECTION, SOLUTION INTRAVENOUS; SUBCUTANEOUS at 13:14

## 2024-06-18 RX ADMIN — CYCLOBENZAPRINE 10 MG: 10 TABLET, FILM COATED ORAL at 17:27

## 2024-06-18 RX ADMIN — GADOBUTROL 9 ML: 604.72 INJECTION INTRAVENOUS at 19:39

## 2024-06-18 RX ADMIN — ACETAMINOPHEN 650 MG: 325 TABLET, FILM COATED ORAL at 13:13

## 2024-06-18 RX ADMIN — ACETAMINOPHEN 650 MG: 325 TABLET, FILM COATED ORAL at 07:16

## 2024-06-18 RX ADMIN — HYDROMORPHONE HYDROCHLORIDE 0.2 MG: 0.2 INJECTION, SOLUTION INTRAMUSCULAR; INTRAVENOUS; SUBCUTANEOUS at 20:27

## 2024-06-18 RX ADMIN — CYCLOBENZAPRINE 10 MG: 10 TABLET, FILM COATED ORAL at 09:05

## 2024-06-18 RX ADMIN — OXYCODONE HYDROCHLORIDE 5 MG: 5 TABLET ORAL at 22:03

## 2024-06-18 RX ADMIN — CYCLOBENZAPRINE 10 MG: 10 TABLET, FILM COATED ORAL at 21:57

## 2024-06-18 ASSESSMENT — ACTIVITIES OF DAILY LIVING (ADL)
ADLS_ACUITY_SCORE: 39
ADLS_ACUITY_SCORE: 39
ADLS_ACUITY_SCORE: 25
ADLS_ACUITY_SCORE: 31
ADLS_ACUITY_SCORE: 37
ADLS_ACUITY_SCORE: 31
ADLS_ACUITY_SCORE: 25
ADLS_ACUITY_SCORE: 25
ADLS_ACUITY_SCORE: 31
ADLS_ACUITY_SCORE: 39
ADLS_ACUITY_SCORE: 39
ADLS_ACUITY_SCORE: 31
ADLS_ACUITY_SCORE: 39
ADLS_ACUITY_SCORE: 31
ADLS_ACUITY_SCORE: 31

## 2024-06-18 NOTE — PLAN OF CARE
Goal Outcome Evaluation:    Summary:  06/18/2024  [id number]  Care Plan Summary Note: Transfer from [facility] with Discitis of lumbar region   Orientation: A&Ox4  Activity Level: A-1 GBW  Fall Risk: Yes  Behavior & Aggression Tool Color: Green  Pain Management: Tylenol given  ABNL VS/O2: VSS on RA  ABNL Lab/BG:   Diet: Regular diet  Bowel/Bladder: Continent  Drains/Devices: PIV SL  Tests/Procedures for next shift: TBD  Anticipated DC date: TBD  Other Important Info: Patient has a back stimulator and manages by himself, has the  to turn off before MRI.

## 2024-06-18 NOTE — TELEPHONE ENCOUNTER
Forms/Letter Request    Type of form/letter: OTHER: reasonable accommodations to break apartment lease as they are in assisted living    Do we have the form/letter: Yes: faxed on 6/14/24    Who is the form from? Patient    Where did/will the form come from? form was faxed in    When is form/letter needed by: ASAP    How would you like the form/letter returned: MyChart and call once done    Patient Notified form requests are processed in 5-7 business days:Yes    Could we send this information to you in [employer] or would you prefer to receive a phone call?:   Patient would prefer a phone call   Okay to leave a detailed message?: Yes at Cell number on file:    Telephone Information:   Mobile [phone]

## 2024-06-18 NOTE — PROGRESS NOTES
Contacted regarding 80 yo male presenting to ER with acute on chronic back pain   Lumbar CT with possible osteomyelitis/diskitis at L4-L5.    ESR 66  CRP 12.4  WBC 10.2    Lumbar CT  IMPRESSION:  1.  Findings concerning for discitis-osteomyelitis at L4-L5 with epidural/intraforaminal phlegmon versus abscess at L4-S1, as described.  2.  If not clinically contraindicated, a contrast-enhanced lumbar spine MRI is recommended for better assessment.    RECOMMENDATIONS:  No urgent NS intervention indicated.  No beds at [facility] so will transfer to [facility].  MRI lumbar spine with and without contrast when able (has stimulator that needs to be turned off prior to MRI)  ID consult.  Antibiotics per ID, will likely want held until cultures can be obtained.    Discussed with Dr. Conklin.    Dione Cyr, DANAS  [facility]  [address] [address]    Tel [phone]  Pager [phone]

## 2024-06-18 NOTE — PROGRESS NOTES
[facility]    Medicine Progress Note - Hospitalist Service    Date of Admission:  6/18/2024    Assessment & Plan   Lance Ibrahim is a 79 year old male with a past medical history of heart failure with preserved ejection fraction, hypertension, dyslipidemia, CKD, gout, BPH, type 2 diabetes and chronic low back pain for which he is folllowed by [facility] and has an implantable spine stimulator ([employer], MRI conditional 4/2024) in place. He initially presented to [facility] with acute on chronic, intractable pain with RLE radiculopathy. CT was concerning for discitis-osteomyelitis at L4-5 with epidural/intraforaminal phlegmon vs abscess at L4-S1 with severe foraminal narrowing. Transferred to [facility] for Neurosurgery evaluation and ID consultation.     Acute on chronic back pain with RLE radiculopathy  Possible osteomyelitis/ diskitis L4-L5 with severe foraminal narrowing at L4-S1  Presented with acute on chronic back pain located on the right side of his back.  Significant tenderness to palpation at approximately the L3-L4 level on the right side with associated RLE radiculopathy. No bladder or bowel incontinence, no saddle anesthesia. Uses a walker for shorter distances and wheelchair for longer distances at baseline. Significant pain with weight bearing. No recent trauma or falls, but did get a trigger point injection by [facility] clinic on 6/14. Also note recent hospitalization for severe sepsis secondary to Klebsiella UTI for which patient completed course of antibiotics.   *CT lumbar spine as above (review formal report for complete details)   *Afebrile since admission, WBC WNL. CRP 12.4.   - Neurosurgery consulted   - ID consulted, not currently on antibiotics (deferred until MRI completed)   - MRI lumbar w/wo after stimulator is turned off   - Blood cultures ordered   - Analgesic regimen with PRN tylenol, PRN oxycodone 2.5-5 mg q4 hrs PRN, IV dilaudid 0.2-0.4 mg q2 hrs PRN,  flexeril 10 mg TID. Note pt utilizes NSAIDs solely at home for pain control        Recent severe sepsis in the setting of Klebsiella UTI: Hospitalization on 5/16/2024-5/22/2024 due to severe sepsis related to UTI caused by Klebsiella oxytoca. Patient was on IV Vanco, Levaquin, Cefepime, and Ceftriaxone in the hospital. He was discharged on oral Cefdinir and Levaquin. No current sx of UTI.   *CT A/P on admission negative for renal calculi or hydronephrosis, note left renal cyst. UA with trace blood in urine, otherwise unremarkable.   - Follow up urine culture      T2DM, non-insulin dependent  Peripheral neuropathy:   Hemoglobin A1C   Date Value Ref Range Status   06/18/2024 8.0 (H) <5.7 % Final     Comment:     Normal <5.7%   Prediabetes 5.7-6.4%    Diabetes 6.5% or higher     Note: Adopted from ADA consensus guidelines.   04/29/2021 8.1 (H) 0 - 5.6 % Final     Comment:     Normal <5.7% Prediabetes 5.7-6.4%  Diabetes 6.5% or higher - adopted from ADA   consensus guidelines.  Results confirmed by repeat test     [Metformin 2000 mg daily]  - Hold PTA oral agents at this time   - Medium sliding scale insulin ordered  - BS per protocol   - Monitor for hypoglycemia    Recent Labs   Lab 06/18/24  0845 06/18/24  0535 06/17/24  1758   * 120* 139*      HFpEF, not in acute exacerbation   Mild aortic stenosis   CAD, subtotal occlusion in small distal LAD   HTN  HLD  *Echo 5/16/24 with preserved EF, borderline global hypokinesia, wall motion consistent with conduction abnormality.   *Appears euvolemic on exam  - Resume PTA meds once verified      CKD IIIa: Baseline creatinine 1.3-1.6 more recently. Stable on admission.      Normocytic anemia: Baseline Hgb 8-9 range. 10.2 on admission.   - Monitor     Gout: Not in a flare.  - Resume PTA meds once verified      Biliary obstruction with stricture s/p EUS/ERCP 4/30/24 with sphincterotomy and temporary stent placement   *Admission CT notes common bile duct stent with slight  dilatation of the intrahepatic bile ducts. A tiny portion of the distal stent appears fractured, of doubtful clinical significance. Pt without abdominal pain.   - PTA Ursodiol 300 mg BID once verified      Sleep apnea: Does not use CPAP    Hx of TIA, 1993, no residual deficits     PAD s/p toe amputation     Baseline R>L DELFINO, mild     BASELINE PRIOR TO ADMISSION  1.  Living Situation:  [facility] in [place]  2. Ambulation:  walker for short distances, wheelchair for longer distances  3.  Mental status:  alert and oriented X4  4. Diet: regular   5.  Family contact:  Extended Emergency Contact Information  Primary Emergency Contact: Omari Ireland  Mobile Phone: [phone]  Relation: Stepchild  Secondary Emergency Contact: UNKNOWN,UNKNOWN  Home Phone: [phone]  Relation: Unknown           Diet: Combination Diet Regular Diet Adult    DVT Prophylaxis: Pneumatic Compression Devices and Ambulate every shift  Barnes Catheter: Not present  Lines: None     Cardiac Monitoring: None  Code Status: Full Code      Clinically Significant Risk Factors Present on Admission                # Drug Induced Platelet Defect: home medication list includes an antiplatelet medication   # Hypertension: Noted on problem list  # Chronic heart failure with preserved ejection fraction: heart failure noted on problem list and last echo with EF >50%   # Anemia: based on hgb <11          # DMII: A1C = 8.0 % (Ref range: <5.7 %) within past 6 months    # Obesity: Estimated body mass index is 30.11 kg/m  as calculated from the following:    Height as of 6/17/24: 1.829 m (6').    Weight as of 6/17/24: 100.7 kg (222 lb).         # Financial/Environmental Concerns:           Disposition Plan     Medically Ready for Discharge: Anticipated in 2-4 Days           The patient's care was discussed with the Attending Physician, Dr. Vivar, Bedside Nurse, and Patient.    Lida Bowers PA-C  Hospitalist Service  [facility]  [facility]  Securely message with [employer] (more info)  Text page via [facility] Paging/Directory   ______________________________________________________________________    Interval History   Assumed care 6/18. Still with back pain, alleviated by flexeril. Hx of chronic low back pain, but acute pain is more localized to the right side with new R sided radiculopathy. Pt has a pain stimulator in place since 4/2024. Follows with [facility]. Utliizes NSAIDs alone for pain control. No bowel or bladder incontinence, saddle anesthesia.     Physical Exam   Vital Signs: Temp: 98.7  F (37.1  C) Temp src: Oral BP: 137/73 Pulse: 80     SpO2: 99 % O2 Device: None (Room air)    Weight: 0 lbs 0 oz    CONSTITUTIONAL: Pt laying in bed, dressed in hospital garb. Appears comfortable. Cooperative with interview.  HEENT: Normocephalic, atraumatic.  CARDIOVASCULAR: RRR, no murmurs, rubs, or extra heart sounds appreciated. Pulses +2/4 and regular in upper and lower extremities, bilaterally.   RESPIRATORY: No increased work of breathing.  CTA, bilat; no wheezes, rales, or rhonchi appreciated.  GASTROINTESTINAL:  Abdomen soft, non-distended. BS auscultated in all four quadrants. Negative for tenderness to palpation.    MUSCULOSKELETAL: R low back pain with R sided radiculopathy; unable to perform straight leg raise on the right due to pain. No gross deformities noted. Normal muscle tone.   HEMATOLOGIC/LYMPHATIC/IMMUNOLOGIC: Mild baseline R >L DELFINO, no calf TTP.  NEUROLOGIC: Alert and oriented to person, place, and time. No focal neuro deficits. Baseline LE radiculopathy.   SKIN: Warm, dry, intact.     Medical Decision Making       50 MINUTES SPENT BY ME on the date of service doing chart review, history, exam, documentation & further activities per the note.      Data     I have personally reviewed the following data over the past 24 hrs:    8.2  \   9.7 (L)   / 284     137 99 28.5 (H) /  214 (H)   4.1 25 1.25 (H) \     ALT: 25 AST: 19  AP: 155 (H) TBILI: 0.4   ALB: 3.7 TOT PROTEIN: 7.3 LIPASE: N/A     TSH: N/A T4: N/A A1C: 8.0 (H)     Procal: N/A CRP: 12.40 (H) Lactic Acid: N/A         Imaging results reviewed over the past 24 hrs:   Recent Results (from the past 24 hour(s))   CT Abdomen Pelvis w Contrast    Narrative    EXAM: CT ABDOMEN PELVIS W CONTRAST  LOCATION: [facility]  DATE: 6/17/2024    INDICATION: R low back pain  COMPARISON: CT abdomen and pelvis 4/26/2024  TECHNIQUE: CT scan of the abdomen and pelvis was performed following injection of IV contrast. Multiplanar reformats were obtained. Dose reduction techniques were used.  CONTRAST: Isovue 370 80 mls    FINDINGS:   LOWER CHEST: Irregular pulmonary nodule left lower lobe on image #1 measuring 2.7 x 2.3 cm incompletely imaged, present on the previous study.    HEPATOBILIARY: Stent within the common bile duct with slight dilatation of the central intrahepatic bile ducts and proximal common bile duct. The tip of the distal portion of the stent has fractured.    PANCREAS: Normal.    SPLEEN: Normal.    ADRENAL GLANDS: Normal.    KIDNEYS/BLADDER: No stones or hydronephrosis. 3.6 cm cyst upper pole left kidney.    BOWEL: Diverticula descending and sigmoid colon, without evidence for diverticulitis or bowel obstruction.    LYMPH NODES: Normal.    VASCULATURE: Atherosclerotic disease abdominal aorta and iliac arteries. Plaque and moderate stenosis at the origins of the celiac trunk and superior mesenteric artery.    PELVIC ORGANS: Radiation seeds in the prostate gland.    MUSCULOSKELETAL: Ventral wall hernia repair with mesh. Degenerative and hypertrophic changes lower thoracic and lumbar spine. Mechanical stimulator right buttocks with leads in the thoracic epidural space. Fat-containing right inguinal hernia.      Impression    IMPRESSION:   1.  No renal calculi or hydronephrosis. Left renal cyst.  2.  Common bile duct stent with slight dilatation of the intrahepatic  bile ducts. A tiny portion of the distal stent appears fractured, of doubtful clinical significance.   3.  Left colonic diverticulosis.  4.  Degenerative disc and facet disease lumbar spine.  5.  Advanced atherosclerotic disease.   CT Lumbar Spine Reconstructed    Narrative    EXAM: CT LUMBAR SPINE RECONSTRUCTED  LOCATION: [facility]  DATE: 6/17/2024    INDICATION: Right low back pain, sciatica, no complicating feature. Chronic LBP duration >= 3 months; Worsening or not improving; None of the following: PT chiropractic in the last 60 days or follow up in the last 28   60 days.  COMPARISON: CT dated 5/16/2024.  TECHNIQUE: Routine CT Lumbar Spine without IV contrast. Multiplanar reformats. Dose reduction techniques were used.     FINDINGS:  VERTEBRA: Unchanged alignment with straightening of the normal lumbar lordosis and mild abnormal thoracolumbar kyphosis where there is mild levoconvex curvature. Unchanged 2-3 mm stepwise grade 1 degenerative retrolisthesis at L3-L5 and 1 mm   retrolisthesis at L5-S1. No pars interarticularis defect. Baastrup's configuration at L4-L5. A spinal cord stimulator generator pack and leads remains in place coursing into the spinal canal at T12-L1 level terminating at T8 vertebral level. No acute   fracture or compression deformity. Progressed moderate intervertebral disc height loss at L4-L5, previously mild-moderate, with increased intradiscal vacuum phenomenon. Additionally, subtle demineralization involving the inferior L4 and, to a lesser   extent, superior L5 endplates, not appreciated on the prior exam. Similar multilevel interbody degenerative change elsewhere, most notably at L5-S1 where there is moderate intervertebral disc height loss. Similar multilevel facet arthrosis, worst and   moderate at L3-L4 on the left, L4-L5 on the right. No abnormal facet joint space widening.    CANAL/FORAMINA: Superimposed on underlying spondylosis, likely worsened  moderate spinal canal stenosis at L4-L5 and mild-moderate spinal canal stenosis at L5-S1 (previously mild-moderate and mild, respectively) due to soft tissue thickening surrounding   the L4-L5 intervertebral disc which results in new effacement of the ventral prevertebral fat at L4-S1. Also new is effacement of the bilateral L4-S1 intraforaminal fat contributing to now severe foraminal narrowing, previously moderate-severe.    PARASPINAL: No discrete loculated paraspinal fluid collection, although assessment is limited without intravenous contrast. A CBD stent in place. Colonic diverticulosis. Several presumed simple left renal cysts. Moderate calcified intra-abdominal   atherosclerosis. Findings of the visualized abdominopelvic cavity are dictated separately.      Impression    IMPRESSION:  1.  Findings concerning for discitis-osteomyelitis at L4-L5 with epidural/intraforaminal phlegmon versus abscess at L4-S1, as described.  2.  If not clinically contraindicated, a contrast-enhanced lumbar spine MRI is recommended for better assessment.

## 2024-06-18 NOTE — PROGRESS NOTES
I called MRI to follow up when can they take the patient. MRI RN stated we will call as soon as we have an opening for him. She said they are going through with the STAT orders. RN also confirmed that they have the MRI checklist and patient need to bring his stimulator remote and .

## 2024-06-18 NOTE — CONSULTS
[facility]    Infectious Disease Consultation     Date of Admission:  6/18/2024  Date of Consult (When I saw the patient): 06/18/24    Assessment & Plan   Lance Ibrahim is a 79 year old male who was admitted on 6/18/2024.     Impression:  80 yo with PMH of  heart failure with preserved ejection fraction, hypertension, dyslipidemia, CKD, gout, BPH, type 2 diabetes and chronic low back pain for which he is folllowed by [facility] and has an implantable spine stimulator ([employer], MRI conditional 4/2024) in place.   Presented to [facility] with acute on chronic, intractable pain with RLE radiculopathy.   CT was concerning for discitis-osteomyelitis at L4-5 with epidural/intraforaminal phlegmon vs abscess at L4-S1 with severe foraminal narrowing. Transferred to [facility] for Neurosurgery evaluation and ID consultation.   Of note recent history of urosepsis with klebsiella in the blood and urine treated with antibiotics   Pending blood cultures     Recommendations:   Noted plans for MRI  Consult with IR for possible aspiration/ biopsy for cultures   Hold off antibiotics till positive blood cultures or fevers       Steve Conklin MD    Reason for Consult   Reason for consult: I was asked to evaluate this patient for discitis.    Primary Care Physician   Rickey Adamson    Chief Complaint   Back pain     History is obtained from the patient and medical records    History of Present Illness   Lance Ibrahim is a 79 year old male who presents with back pain, no fever started a few days ago     Past Medical History   I have reviewed this patient's medical history and updated it with pertinent information if needed.   Past Medical History:   Diagnosis Date    Anemia     Arthritis     osteoarthritis knees    BPH     CAD (coronary artery disease)     subtotal occlusion in the small distal LAD     Cardiomyopathy     Cerebral artery occlusion with cerebral infarction     TIA 1993, no residual    Cervicalgia      CHF (congestive heart failure) (H)     Chronic kidney disease     Chronic low back pain     Chronic rhinitis     Gouty arthropathy     hands    Hyperlipidemia     Hypertension     Kidney stone     Nocturia     Obesity     Osteomyelitis (H) 08/2023    Foot    PVD (peripheral vascular disease)     Sleep apnea     doesn't use cpap    TIA (transient ischaemic attack) 1993    Type 2 diabetes mellitus - 11/08/2018    Ureteral stone        Past Surgical History   I have reviewed this patient's surgical history and updated it with pertinent information if needed.  Past Surgical History:   Procedure Laterality Date    AMPUTATE FOOT Right 08/07/2023    Procedure: AMPUTATION, right hallux;  Surgeon: Nakul Salazar DPM;  Location: [facility] OR    AMPUTATE TOE(S) Left 10/15/2018    Procedure: AMPUTATE TOE(S);  Left third toe amputation;  Surgeon: Ayaka Azevedo DPM, Podiatry/Foot and Ankle Surgery;  Location:  OR    ARTHROPLASTY KNEE Right 12/07/2018    Procedure: Right total knee arthroplasty;  Surgeon: Issa Cunha MD;  Location:  OR    COLONOSCOPY  03/09/2013    Procedure: COLONOSCOPY;  COLONOSCOPY;  Surgeon: Chau Hogan MD;  Location:  GI    CV HEART CATHETERIZATION WITH POSSIBLE INTERVENTION Left 03/05/2019    Procedure: Coronary Angiogram;  Surgeon: Nas Linda MD;  Location:  HEART CARDIAC CATH LAB    Diastasis recti repair  1985    ENDOSCOPIC RETROGRADE CHOLANGIOPANCREATOGRAM N/A 04/30/2024    Procedure: ENDOSCOPIC RETROGRADE CHOLANGIOPANCREATOGRAPHY, BILIARY SPHINCTEROTOMY, DILATION, BRUSHING, BIOPSIES with DISTAL EXTRA HEPATIC BILE DUCT STRICTURE;  Surgeon: Aldo Gongora MD;  Location: [facility] OR    ESOPHAGOSCOPY, GASTROSCOPY, DUODENOSCOPY (EGD), COMBINED N/A 04/30/2024    Procedure: ESOPHAGOGASTRODUODENOSCOPY, WITH ENDOSCOPIC ULTRASOUND GUIDANCE;  Surgeon: Aldo Gongora MD;  Location: [facility] OR    EXTRACAPSULAR CATARACT EXTRATION WITH INTRAOCULAR LENS  IMPLANT Left 03/13/2017    EXTRACAPSULAR CATARACT EXTRATION WITH INTRAOCULAR LENS IMPLANT Right     FOOT SURGERY  04/2013    cyst removal     HERNIA REPAIR  07/13/2004    ventral     ORIF Shoulder Left     PROSTATE SURGERY  02/2024    Urolift    SPINAL CORD STIMULATOR IMPLANT  04/03/2024    Ventral hernia repair NOS  1987       Prior to Admission Medications   Prior to Admission Medications   Prescriptions Last Dose Informant Patient Reported? Taking?   CONTOUR NEXT TEST test strip   No No   Sig: USE TO TEST BLOOD SUGAR 2 TIMES DAILY OR AS DIRECTED.   Ferrous Gluconate 240 (27 Fe) MG TABS   Yes No   Sig: Take 1 tablet by mouth daily    HEMP OIL OR EXTRACT OR OTHER CBD CANNABINOID, NOT MEDICAL CANNABIS,   Yes No   Sig: Take 1 chew tab by mouth at bedtime CBD gummy   Lidocaine (LIDOCARE) 4 % Patch   Yes No   Sig: Place 1 patch onto the skin daily as needed To prevent lidocaine toxicity, patient should be patch free for 12 hrs daily.   Microlet Lancets MISC   No No   Sig: USE TO TEST BLOOD SUGAR TWICE A DAY OR AS DIRECTED   Probiotic Product (FORTIFY 30 BILLION PROBIOT 50+) CPDR   No No   Sig: Take 1 5 Pack by mouth daily   Semaglutide (RYBELSUS) 14 MG tablet   No No   Sig: Take 14 mg by mouth daily   acetaminophen (TYLENOL) 325 MG tablet   No No   Sig: Take 3 tablets (975 mg) by mouth 3 times daily as needed for mild pain   aspirin 81 MG EC tablet   No No   Sig: Take 1 tablet (81 mg) by mouth daily   blood glucose monitoring (NO BRAND SPECIFIED) meter device kit   No No   Sig: Use to test blood sugar 2 times daily or as directed.   calcium carbonate (TUMS) 500 MG chewable tablet   Yes No   Sig: Take 1,000 mg by mouth 3 times daily as needed for heartburn   colchicine (COLCYRS) 0.6 MG tablet   No No   Sig: TAKE 1 TABLET DAILY   diclofenac (VOLTAREN) 1 % topical gel   Yes No   Sig: Apply 4 g topically 4 times daily as needed for moderate pain   emollient (VANICREAM) external cream   No No   Sig: Apply topically 3 times  daily   famotidine (PEPCID) 20 MG tablet   No No   Sig: Take 1 tablet (20 mg) by mouth 2 times daily as needed (itching)   fluticasone (FLONASE) 50 MCG/ACT nasal spray   Yes No   Sig: Spray 1-2 sprays in nostril daily as needed   gabapentin (NEURONTIN) 300 MG capsule   No No   Sig: Take 1 capsule (300 mg) by mouth 3 times daily as needed   hydrOXYzine HCl (ATARAX) 25 MG tablet   No No   Sig: Take 1 tablet (25 mg) by mouth 3 times daily as needed for itching   ibuprofen (ADVIL/MOTRIN) 400 MG tablet   No No   Sig: Take 1 tablet (400 mg) by mouth every 6 hours as needed for moderate pain   insulin glargine (LANTUS PEN) 100 UNIT/ML pen   No No   Sig: Inject 12 Units Subcutaneous at bedtime   lisinopril (ZESTRIL) 10 MG tablet   No No   Sig: Take 1 tablet (10 mg) by mouth every 24 hours   loratadine (CLARITIN) 10 MG tablet   Yes No   Sig: Take 10 mg by mouth daily as needed for allergies   melatonin 5 MG CAPS   Yes No   Sig: Take 5 mg by mouth at bedtime   methocarbamol (ROBAXIN) 500 MG tablet   No No   Sig: Take 1 tablet (500 mg) by mouth 4 times daily as needed for muscle spasms   multivitamin w/minerals (THERA-VIT-M) tablet   Yes No   Sig: Take 1 tablet by mouth daily Stopping 5/13/24 before surgery   nortriptyline (PAMELOR) 10 MG capsule   Yes No   Sig: Take 20 mg by mouth nightly as needed   oxyCODONE (ROXICODONE) 5 MG tablet   No No   Sig: Take 1 tablet (5 mg) by mouth every 6 hours as needed for severe pain   polyethylene glycol (MIRALAX) 17 GM/Dose powder   No No   Sig: Take 17 g by mouth daily as needed for constipation   sodium bicarbonate 650 MG tablet   No No   Sig: Take 3 tablets (1,950 mg) by mouth 3 times daily   torsemide (DEMADEX) 20 MG tablet   No No   Sig: TAKE 1 TABLET DAILY   ursodiol (ACTIGALL) 300 MG capsule   No No   Sig: Take 1 capsule (300 mg) by mouth 2 times daily      Facility-Administered Medications: None     Allergies   Allergies   Allergen Reactions    Ancef [Cefazolin] Rash    Cephalexin  "Itching and Rash     Allergic reaction required hospitalization 2024    Penicillins Anaphylaxis    Sulfa Antibiotics      \"itchy rash, swelling of face and hands\"       Immunization History   Immunization History   Administered Date(s) Administered    COVID-19 Bivalent 12+ (Pfizer) 2022    COVID-19 MONOVALENT 12+ (Pfizer) 2021, 03/10/2021, 10/11/2021    COVID-19 Monovalent 18+ (Moderna) 2022    Flu, Unspecified 2022, 10/01/2023    Influenza (High Dose) 3 valent vaccine 10/12/2016, 2017, 2019    Influenza (IIV3) PF 10/01/2008, 10/01/2010, 10/15/2012, 2014, 10/20/2014    Influenza Vaccine 65+ (FLUAD) 10/03/2023    Influenza Vaccine 65+ (Fluzone HD) 2020, 2021, 2022    Pneumo Conj 13-V (2010&after) 10/19/2015    Pneumococcal 23 valent 2011    TDAP (Adacel,Boostrix) 2003    TDAP Vaccine (Adacel) 2013    Zoster recombinant adjuvanted (SHINGRIX) 2023, 07/15/2023    Zoster vaccine, live 10/15/2023       Social History   I have reviewed this patient's social history and updated it with pertinent information if needed. Lance Ibrahim  reports that he quit smoking about 31 years ago. His smoking use included cigarettes. He has never used smokeless tobacco. He reports that he does not currently use alcohol. He reports that he does not use drugs.    Family History   I have reviewed this patient's family history and updated it with pertinent information if needed.   Family History   Problem Relation Age of Onset    Family History Negative Mother          86 yo    Cancer Father          74 yo brain    Diabetes Maternal Grandfather          89 yo    Alcohol/Drug Paternal Grandfather             Colon Cancer No family hx of        Review of Systems   The 10 point Review of Systems is negative    Physical Exam   Temp: 98.7  F (37.1  C) Temp src: Oral BP: 137/73 Pulse: 80     SpO2: 99 % O2 Device: None (Room air)  " "  Vital Signs with Ranges  Temp:  [97.2  F (36.2  C)-98.7  F (37.1  C)] 98.7  F (37.1  C)  Pulse:  [] 80  Resp:  [16-18] 16  BP: (115-155)/(57-93) 137/73  SpO2:  [91 %-100 %] 99 %  0 lbs 0 oz  There is no height or weight on file to calculate BMI.    GENERAL APPEARANCE:  awake  EYES: Eyes grossly normal to inspection  NECK: no adenopathy  RESP: lungs clear   CV: regular rates and rhythm  LYMPHATICS: normal ant/post cervical and supraclavicular nodes  ABDOMEN: soft, nontender  MS: extremities normal  SKIN: no suspicious lesions or rashes        Data   All laboratory and imaging data in the past 24 hours reviewed  No results for input(s): \"CULT\" in the last 168 hours.  Recent Labs   Lab Test 07/27/20  1818 03/01/19  1126 10/27/18  1221 10/20/18  1401 10/20/18  1400 10/17/18  1858 10/17/18  1854 10/15/18  1615 10/14/18  1455   CULT No growth  No growth No growth No growth No growth No growth No growth No growth Moderate growth  Finegoldia magna (Peptostreptococcus parish)  Susceptibility testing not routinely done  *  Moderate growth  Staphylococcus aureus  * No growth          All cultures:  Recent Labs   Lab 06/17/24  1612   CULTURE Culture in progress      Blood culture:  Results for orders placed or performed during the hospital encounter of 05/16/24   Blood Culture Peripheral Blood    Specimen: Peripheral Blood   Result Value Ref Range    Culture No Growth    Blood Culture Peripheral Blood    Specimen: Peripheral Blood   Result Value Ref Range    Culture No Growth    Blood Culture Peripheral Blood    Specimen: Peripheral Blood   Result Value Ref Range    Culture Positive on the 2nd day of incubation (A)     Culture Klebsiella oxytoca (AA)    Blood Culture Peripheral Blood    Specimen: Peripheral Blood   Result Value Ref Range    Culture Positive on the 1st day of incubation (A)     Culture Klebsiella oxytoca (AA)        Susceptibility    Klebsiella oxytoca - CASANDRA     Ampicillin*  Resistant       * " Intrinsically Resistant     Ampicillin/ Sulbactam 4 Susceptible ug/mL     Piperacillin/Tazobactam <=4 Susceptible ug/mL     Ceftazidime <=1 Susceptible ug/mL     Ceftriaxone <=1 Susceptible ug/mL     Cefepime <=1 Susceptible ug/mL     Meropenem <=0.25 Susceptible ug/mL     Gentamicin <=1 Susceptible ug/mL     Tobramycin <=1 Susceptible ug/mL     Ciprofloxacin <=0.25 Susceptible ug/mL     Levofloxacin <=0.12 Susceptible ug/mL     Trimethoprim/Sulfamethoxazole <=1/19 Susceptible ug/mL   Results for orders placed or performed during the hospital encounter of 04/25/24   Blood Culture Peripheral Blood    Specimen: Peripheral Blood   Result Value Ref Range    Culture No Growth    Blood Culture Peripheral Blood    Specimen: Peripheral Blood   Result Value Ref Range    Culture No Growth    Results for orders placed or performed during the hospital encounter of 08/25/22   Blood Culture Peripheral Blood    Specimen: Peripheral Blood   Result Value Ref Range    Culture No Growth    Blood Culture Peripheral Blood    Specimen: Peripheral Blood   Result Value Ref Range    Culture No Growth    Results for orders placed or performed during the hospital encounter of 07/28/21   Blood Culture Peripheral Blood    Specimen: Peripheral Blood   Result Value Ref Range    Culture No Growth    Blood Culture Peripheral Blood    Specimen: Peripheral Blood   Result Value Ref Range    Culture No Growth    Results for orders placed or performed during the hospital encounter of 07/27/20   Blood culture    Specimen: Blood    Right Hand   Result Value Ref Range    Specimen Description Blood Right Hand     Culture Micro No growth    Blood culture    Specimen: Blood    Right Arm   Result Value Ref Range    Specimen Description Blood Right Arm     Culture Micro No growth    Results for orders placed or performed during the hospital encounter of 10/14/18   Blood culture    Specimen: Blood    Left Hand   Result Value Ref Range    Specimen Description  Blood Left Hand     Special Requests Aerobic and anaerobic bottles received     Culture Micro No growth    Blood culture    Specimen: Blood    Left Arm   Result Value Ref Range    Specimen Description Blood Left Arm     Special Requests Aerobic and anaerobic bottles received     Culture Micro No growth    Blood culture    Specimen: Blood    Right Arm   Result Value Ref Range    Specimen Description Blood Right Arm     Special Requests Aerobic and anaerobic bottles received     Culture Micro No growth    Blood culture    Specimen: Blood    Right Arm   Result Value Ref Range    Specimen Description Blood Right Arm     Special Requests Aerobic and anaerobic bottles received     Culture Micro No growth    Results for orders placed or performed during the hospital encounter of 06/19/18   Blood culture    Specimen: Blood    Left Arm   Result Value Ref Range    Specimen Description Blood Left Arm     Special Requests Aerobic and anaerobic bottles received     Culture Micro No growth    Blood culture    Specimen: Blood    Right Arm   Result Value Ref Range    Specimen Description Blood Right Arm     Special Requests Aerobic and anaerobic bottles received     Culture Micro No growth    Results for orders placed or performed during the hospital encounter of 05/01/18   Blood culture    Specimen: Blood    Right Arm   Result Value Ref Range    Specimen Description Blood Right Arm     Special Requests Aerobic and anaerobic bottles received     Culture Micro No growth    Blood culture    Specimen: Arm, Right; Blood    Right Arm   Result Value Ref Range    Specimen Description Blood Right Arm     Special Requests Aerobic and anaerobic bottles received     Culture Micro No growth      *Note: Due to a large number of results and/or encounters for the requested time period, some results have not been displayed. A complete set of results can be found in Results Review.      Urine culture:  Results for orders placed or performed in  visit on 06/17/24   Urine Culture Aerobic Bacterial    Specimen: Urine, Clean Catch   Result Value Ref Range    Culture Culture in progress    Results for orders placed or performed during the hospital encounter of 05/16/24   Urine Culture    Specimen: Urine, NOS   Result Value Ref Range    Culture >100,000 CFU/mL Klebsiella oxytoca (A)        Susceptibility    Klebsiella oxytoca - CASANDRA     Ampicillin*  Resistant       * Intrinsically Resistant     Ampicillin/ Sulbactam 8 Susceptible ug/mL     Piperacillin/Tazobactam <=4 Susceptible ug/mL     Cefazolin* <=4 Susceptible ug/mL      * Cefazolin CASANDRA breakpoints are for the treatment of uncomplicated urinary tract infections. For the treatment of systemic infections, please contact the laboratory for additional testing.     Cefoxitin <=4 Susceptible ug/mL     Ceftazidime <=1 Susceptible ug/mL     Ceftriaxone <=1 Susceptible ug/mL     Cefepime <=1 Susceptible ug/mL     Gentamicin <=1 Susceptible ug/mL     Tobramycin <=1 Susceptible ug/mL     Ciprofloxacin <=0.25 Susceptible ug/mL     Levofloxacin <=0.12 Susceptible ug/mL     Nitrofurantoin 32 Susceptible ug/mL     Trimethoprim/Sulfamethoxazole <=1/19 Susceptible ug/mL   Results for orders placed or performed during the hospital encounter of 03/01/19   Urine Culture    Specimen: Urine clean catch; Midstream Urine   Result Value Ref Range    Specimen Description Midstream Urine     Special Requests Specimen received in preservative     Culture Micro No growth    Results for orders placed or performed during the hospital encounter of 05/01/18   Urine Culture Aerobic Bacterial    Specimen: Midstream Urine   Result Value Ref Range    Specimen Description Midstream Urine     Special Requests Specimen received in preservative     Culture Micro No growth      *Note: Due to a large number of results and/or encounters for the requested time period, some results have not been displayed. A complete set of results can be found in  Results Review.

## 2024-06-18 NOTE — H&P
[facility]    Hospitalist History and Physical  Date of Admission:  (Not on file)    Primary Care Physician   Rickey Adamson    Chief Complaint  No chief complaint on file.    History obtained from the patient    History of Present Illness   Lance Ibrahim is a 79 year old male with a past medical history of heart failure with preserved ejection fraction, hypertension, dyslipidemia, CKD, gout, BPH, type 2 diabetes, presents to the hospital with acute on chronic back pain.  The patient reports that his symptoms started approximately 6 days ago with worsening of his back pain.  He reports the pain is located on the right side of his back.  His pain progressively got worse so he went to his pain clinic where he received a trigger point injection.  The following day his pain became significantly worse.  The patient tried multiple medications including NSAIDs and lidocaine with only temporary improvement of symptoms.  Due to ongoing symptoms he decided to present to the [facility] emergency room.  In the emergency room he underwent imaging with a CT scan of the back which was concerning for possible discitis for which the patient's case was discussed with neurosurgery and the patient is being transferred to [facility] for MRI and neurosurgical evaluation.  I evaluated patient on arrival.  He reports that his back pain continues.  He denies any other complaint such as fevers or chills or dysuria.  He reports that he received a dose of Flexeril in the emergency room which helped with this pain.      Past Medical History    I have reviewed this patient's medical history and updated it with pertinent information if needed.   Past Medical History:   Diagnosis Date    Anemia     Arthritis     osteoarthritis knees    BPH     CAD (coronary artery disease)     subtotal occlusion in the small distal LAD     Cardiomyopathy     Cerebral artery occlusion with cerebral infarction     TIA 1993, no  residual    Cervicalgia     CHF (congestive heart failure) (H)     Chronic kidney disease     Chronic low back pain     Chronic rhinitis     Gouty arthropathy     hands    Hyperlipidemia     Hypertension     Kidney stone     Nocturia     Obesity     Osteomyelitis (H) 08/2023    Foot    PVD (peripheral vascular disease)     Sleep apnea     doesn't use cpap    TIA (transient ischaemic attack) 1993    Type 2 diabetes mellitus - 11/08/2018    Ureteral stone        Past Surgical History   I have reviewed this patient's surgical history and updated it with pertinent information if needed.  Past Surgical History:   Procedure Laterality Date    AMPUTATE FOOT Right 08/07/2023    Procedure: AMPUTATION, right hallux;  Surgeon: Nakul Salazar DPM;  Location: [facility] OR    AMPUTATE TOE(S) Left 10/15/2018    Procedure: AMPUTATE TOE(S);  Left third toe amputation;  Surgeon: Ayaka Azevedo DPM, Podiatry/Foot and Ankle Surgery;  Location:  OR    ARTHROPLASTY KNEE Right 12/07/2018    Procedure: Right total knee arthroplasty;  Surgeon: Issa Cunha MD;  Location:  OR    COLONOSCOPY  03/09/2013    Procedure: COLONOSCOPY;  COLONOSCOPY;  Surgeon: Chau Hogan MD;  Location:  GI    CV HEART CATHETERIZATION WITH POSSIBLE INTERVENTION Left 03/05/2019    Procedure: Coronary Angiogram;  Surgeon: Nas Linda MD;  Location:  HEART CARDIAC CATH LAB    Diastasis recti repair  1985    ENDOSCOPIC RETROGRADE CHOLANGIOPANCREATOGRAM N/A 04/30/2024    Procedure: ENDOSCOPIC RETROGRADE CHOLANGIOPANCREATOGRAPHY, BILIARY SPHINCTEROTOMY, DILATION, BRUSHING, BIOPSIES with DISTAL EXTRA HEPATIC BILE DUCT STRICTURE;  Surgeon: Aldo Gongora MD;  Location: [facility] OR    ESOPHAGOSCOPY, GASTROSCOPY, DUODENOSCOPY (EGD), COMBINED N/A 04/30/2024    Procedure: ESOPHAGOGASTRODUODENOSCOPY, WITH ENDOSCOPIC ULTRASOUND GUIDANCE;  Surgeon: Aldo Gongora MD;  Location: [facility] OR    EXTRACAPSULAR CATARACT  "EXTRATION WITH INTRAOCULAR LENS IMPLANT Left 2017    EXTRACAPSULAR CATARACT EXTRATION WITH INTRAOCULAR LENS IMPLANT Right     FOOT SURGERY  2013    cyst removal     HERNIA REPAIR  2004    ventral     ORIF Shoulder Left     PROSTATE SURGERY  2024    Urolift    SPINAL CORD STIMULATOR IMPLANT  2024    Ventral hernia repair NOS  1987       Allergies   Allergies   Allergen Reactions    Ancef [Cefazolin] Rash    Cephalexin Itching and Rash     Allergic reaction required hospitalization 2024    Penicillins Anaphylaxis    Sulfa Antibiotics      \"itchy rash, swelling of face and hands\"       Social History   I have reviewed this patient's social history and updated it with pertinent information if needed. Lance Ibrahim  reports that he quit smoking about 31 years ago. His smoking use included cigarettes. He has never used smokeless tobacco. He reports that he does not currently use alcohol. He reports that he does not use drugs.    Family History   I have reviewed this patient's family history and updated it with pertinent information if needed.   Family History   Problem Relation Age of Onset    Family History Negative Mother          86 yo    Cancer Father          74 yo brain    Diabetes Maternal Grandfather          89 yo    Alcohol/Drug Paternal Grandfather             Colon Cancer No family hx of        Physical Exam   Physical Exam  Vitals reviewed.   Constitutional:       Appearance: Normal appearance.      Comments: Pleasant elderly man seen resting in bed comfortably no apparent distress in the medical unit.   HENT:      Head: Normocephalic and atraumatic.   Cardiovascular:      Rate and Rhythm: Normal rate and regular rhythm.   Pulmonary:      Effort: Pulmonary effort is normal.      Breath sounds: Normal breath sounds.   Abdominal:      General: Abdomen is flat. Bowel sounds are normal.      Palpations: Abdomen is soft.   Musculoskeletal:      Comments: " Tenderness over the right side of the back at approximately the L3 and L4 level.    Skin:     General: Skin is warm and dry.   Neurological:      General: No focal deficit present.      Mental Status: He is alert. Mental status is at baseline.         Assessment & Plan   Lance Ibrahim is a 79 year old male with a past medical history of heart failure with preserved ejection fraction, hypertension, dyslipidemia, CKD, gout, BPH, type 2 diabetes, presents to the hospital with acute on chronic back pain.    Acute on chronic back pain  Possible osteomyelitis/ diskitis L4-L5  Patient presenting with acute on chronic back pain located on the right side of his back.  Significant tenderness to palpation at approximately the L3-L4 level on the right side.  No neurodeficits noted.  Evaluation in the emergency room was concerning for possible discitis on CT.  He did have a recent episode of bacteremia due to klebsiella oxytoca in 5/2024.  His case was discussed with neurosurgery and the patient was transferred to [facility] for further evaluation and MRI.  MRI lumbar spine w/wo after stimulator is turned off  Id consult, neursurgery consult  Defer antibiotics until MRI returns   Continue muscle relaxants and analgesia.    DM2  Dm neuropathy  A1c was 8.0% in 2/2024  Insulin sliding scale  Hypoglycemia protocol  Resume tpa lantus once med rec is complete     HFpEF  Non-obstructive CAD  Htn  HLD  Appears euvolemic on exam  Resume tpa meds once med rec is compelte as needed    CKD  Crt is variable, but appears to be close to his baseline.    Gout  Not in a flare.  Resume pta meds as needed     Biliary obstruction with stricture s/p EUS/ERCP 4/30/24 with sphincterotomy and temporary stent placement   - PTA Ursodiol 300 mg BID    Sleep apnea  Does not use CPAP    DVT ppx: scd  Expected length of stay greater than 2 days  Code Status: full code    Medical Decision Making       75 MINUTES SPENT BY ME on the date of service doing  chart review, history, exam, documentation & further activities per the note.

## 2024-06-18 NOTE — CONSULTS
Neurosurgery Consult    Assessment  78 yo male with acute on chronic back pain with recent spinal cord stimulator placed   April 2024 with one week of worsening right sided low back pain with lumbar CT revealing possible L4-L5 diskitis/osteomyelitis.  Of noted, recent Klebsiella bacteremia last month.      Plan:  Lumbar MRI with and without contrast today.  Infectious disease consult.    HPI    Lance Ibrahim is a 79 year old male with a past medical history of chronic pain syndrome in which he had a spinal cord stimulator placed April 3, 2024 with Dr. Martin.  Recent severe sepsis due to Klebsiella bacteremia May 2024, presented to ER with complaint of 6 days of worsening  back pain.  He reports the pain is located on the right side of his back.  His pain progressively got worse so he went to his pain clinic where he received a trigger point injection last week. The following day his pain became significantly worse.  He has tried medications including NSAIDs and lidocaine with only temporary improvement of symptoms.  Due to ongoing symptoms he decided to present to the [facility] emergency room.  In the emergency room he underwent imaging with a CT scan of the back which was concerning for possible discitis.  No beds at  so transferred to [facility].   He denies any other complaint such as fevers or chills or dysuria.  He reports that he received a dose of Flexeril in the emergency room which helped with this pain.     Labs:    ESR 66  CRP 12.4  WBC 10.2    Medical history  DM  CKD  HTN  CAD  Gout  ASA  DVR  HF    Social history  , lives in assisted living facility.      Exam  Awake and alert, no acute distress.  Pain on palpation of lumbar spine on right side.  Midline incision without signs of infection.  Intact strength to right leg, left leg likely also intact but has severe pain with straightening but iliopsoas, dorsi/plantar flexion 5/5, quads gives significant pain when tested.  Sensation to lower  extremities intact.    Imaging    Lumbar CT:  FINDINGS:  VERTEBRA: Unchanged alignment with straightening of the normal lumbar lordosis and mild abnormal thoracolumbar kyphosis where there is mild levoconvex curvature. Unchanged 2-3 mm stepwise grade 1 degenerative retrolisthesis at L3-L5 and 1 mm retrolisthesis at L5-S1. No pars interarticularis defect. Baastrup's configuration at L4-L5. A spinal cord stimulator generator pack and leads remains in place coursing into the spinal canal at T12-L1 level terminating at T8 vertebral level. No acute fracture or compression deformity. Progressed moderate intervertebral disc height loss at L4-L5, previously mild-moderate, with increased intradiscal vacuum phenomenon. Additionally, subtle demineralization involving the inferior L4 and, to a lesser extent, superior L5 endplates, not appreciated on the prior exam. Similar multilevel interbody degenerative change elsewhere, most notably at L5-S1 where there is moderate intervertebral disc height loss. Similar multilevel facet arthrosis, worst and moderate at L3-L4 on the left, L4-L5 on the right. No abnormal facet joint space widening.     CANAL/FORAMINA: Superimposed on underlying spondylosis, likely worsened moderate spinal canal stenosis at L4-L5 and mild-moderate spinal canal stenosis at L5-S1 (previously mild-moderate and mild, respectively) due to soft tissue thickening surrounding   the L4-L5 intervertebral disc which results in new effacement of the ventral prevertebral fat at L4-S1. Also new is effacement of the bilateral L4-S1 intraforaminal fat contributing to now severe foraminal narrowing, previously moderate-severe.     PARASPINAL: No discrete loculated paraspinal fluid collection, although assessment is limited without intravenous contrast. A CBD stent in place. Colonic diverticulosis. Several presumed simple left renal cysts. Moderate calcified intra-abdominal   atherosclerosis. Findings of the visualized  abdominopelvic cavity are dictated separately.                                                                      IMPRESSION:  1.  Findings concerning for discitis-osteomyelitis at L4-L5 with epidural/intraforaminal phlegmon versus abscess at L4-S1, as described.  2.  If not clinically contraindicated, a contrast-enhanced lumbar spine MRI is recommended for better assessment.    Discussed with Dr. Conklin.    Dione Cyr, DANAS  [facility]  [address] [address]    Tel [phone]  Pager [phone]

## 2024-06-18 NOTE — PROGRESS NOTES
MRI not completed yet, discussed with charge RN who will follow-up regarding delay. Appreciate help.

## 2024-06-18 NOTE — PROGRESS NOTES
Chart checked    Awaiting MRI   Currently, no surgical intervention indicated  ABX per ID; likely hold until cultures can be obtained    Lin Jasso PA-C  [facility]  [address] [facility]  [address] [place] [id number]  Tel [phone]  Fax [phone]  Text page via [facility] Paging/Directory

## 2024-06-19 ENCOUNTER — PATIENT OUTREACH (OUTPATIENT)
Dept: CARE COORDINATION | Facility: CLINIC | Age: 79
End: 2024-06-19
Payer: COMMERCIAL

## 2024-06-19 ENCOUNTER — APPOINTMENT (OUTPATIENT)
Dept: INTERVENTIONAL RADIOLOGY/VASCULAR | Facility: CLINIC | Age: 79
DRG: 477 | End: 2024-06-19
Attending: NURSE PRACTITIONER
Payer: COMMERCIAL

## 2024-06-19 LAB
ANION GAP SERPL CALCULATED.3IONS-SCNC: 13 MMOL/L (ref 7–15)
BACTERIA UR CULT: NORMAL
BASOPHILS # BLD AUTO: 0 10E3/UL (ref 0–0.2)
BASOPHILS NFR BLD AUTO: 0 %
BUN SERPL-MCNC: 23.3 MG/DL (ref 8–23)
CALCIUM SERPL-MCNC: 9.1 MG/DL (ref 8.8–10.2)
CHLORIDE SERPL-SCNC: 101 MMOL/L (ref 98–107)
CREAT SERPL-MCNC: 1.16 MG/DL (ref 0.67–1.17)
DEPRECATED HCO3 PLAS-SCNC: 21 MMOL/L (ref 22–29)
EGFRCR SERPLBLD CKD-EPI 2021: 64 ML/MIN/1.73M2
EOSINOPHIL # BLD AUTO: 0.2 10E3/UL (ref 0–0.7)
EOSINOPHIL NFR BLD AUTO: 3 %
ERYTHROCYTE [DISTWIDTH] IN BLOOD BY AUTOMATED COUNT: 16.5 % (ref 10–15)
GLUCOSE BLDC GLUCOMTR-MCNC: 104 MG/DL (ref 70–99)
GLUCOSE BLDC GLUCOMTR-MCNC: 146 MG/DL (ref 70–99)
GLUCOSE BLDC GLUCOMTR-MCNC: 147 MG/DL (ref 70–99)
GLUCOSE BLDC GLUCOMTR-MCNC: 153 MG/DL (ref 70–99)
GLUCOSE BLDC GLUCOMTR-MCNC: 172 MG/DL (ref 70–99)
GLUCOSE SERPL-MCNC: 150 MG/DL (ref 70–99)
GLUCOSE SERPL-MCNC: 159 MG/DL (ref 70–99)
HCT VFR BLD AUTO: 30.8 % (ref 40–53)
HGB BLD-MCNC: 9.7 G/DL (ref 13.3–17.7)
IMM GRANULOCYTES # BLD: 0.1 10E3/UL
IMM GRANULOCYTES NFR BLD: 2 %
INR PPP: 1.04 (ref 0.85–1.15)
LYMPHOCYTES # BLD AUTO: 1.2 10E3/UL (ref 0.8–5.3)
LYMPHOCYTES NFR BLD AUTO: 15 %
MCH RBC QN AUTO: 29.3 PG (ref 26.5–33)
MCHC RBC AUTO-ENTMCNC: 31.5 G/DL (ref 31.5–36.5)
MCV RBC AUTO: 93 FL (ref 78–100)
MONOCYTES # BLD AUTO: 0.7 10E3/UL (ref 0–1.3)
MONOCYTES NFR BLD AUTO: 10 %
NEUTROPHILS # BLD AUTO: 5.5 10E3/UL (ref 1.6–8.3)
NEUTROPHILS NFR BLD AUTO: 71 %
NRBC # BLD AUTO: 0 10E3/UL
NRBC BLD AUTO-RTO: 0 /100
PLATELET # BLD AUTO: 289 10E3/UL (ref 150–450)
POTASSIUM SERPL-SCNC: 4.4 MMOL/L (ref 3.4–5.3)
RBC # BLD AUTO: 3.31 10E6/UL (ref 4.4–5.9)
SODIUM SERPL-SCNC: 135 MMOL/L (ref 135–145)
WBC # BLD AUTO: 7.8 10E3/UL (ref 4–11)

## 2024-06-19 PROCEDURE — 250N000011 HC RX IP 250 OP 636: Mod: JZ | Performed by: RADIOLOGY

## 2024-06-19 PROCEDURE — 99152 MOD SED SAME PHYS/QHP 5/>YRS: CPT

## 2024-06-19 PROCEDURE — 272N000508 HC NEEDLE CR8

## 2024-06-19 PROCEDURE — 87205 SMEAR GRAM STAIN: CPT | Performed by: PHYSICIAN ASSISTANT

## 2024-06-19 PROCEDURE — 87075 CULTR BACTERIA EXCEPT BLOOD: CPT | Performed by: HOSPITALIST

## 2024-06-19 PROCEDURE — 99232 SBSQ HOSP IP/OBS MODERATE 35: CPT | Performed by: PHYSICIAN ASSISTANT

## 2024-06-19 PROCEDURE — 36415 COLL VENOUS BLD VENIPUNCTURE: CPT | Performed by: NURSE PRACTITIONER

## 2024-06-19 PROCEDURE — 250N000011 HC RX IP 250 OP 636: Mod: JZ | Performed by: HOSPITALIST

## 2024-06-19 PROCEDURE — 0Q903ZX DRAINAGE OF LUMBAR VERTEBRA, PERCUTANEOUS APPROACH, DIAGNOSTIC: ICD-10-PCS | Performed by: RADIOLOGY

## 2024-06-19 PROCEDURE — 36415 COLL VENOUS BLD VENIPUNCTURE: CPT | Performed by: PHYSICIAN ASSISTANT

## 2024-06-19 PROCEDURE — 250N000011 HC RX IP 250 OP 636: Performed by: HOSPITALIST

## 2024-06-19 PROCEDURE — 87070 CULTURE OTHR SPECIMN AEROBIC: CPT | Performed by: PHYSICIAN ASSISTANT

## 2024-06-19 PROCEDURE — 0QB03ZX EXCISION OF LUMBAR VERTEBRA, PERCUTANEOUS APPROACH, DIAGNOSTIC: ICD-10-PCS | Performed by: RADIOLOGY

## 2024-06-19 PROCEDURE — 250N000009 HC RX 250

## 2024-06-19 PROCEDURE — 87075 CULTR BACTERIA EXCEPT BLOOD: CPT | Performed by: PHYSICIAN ASSISTANT

## 2024-06-19 PROCEDURE — 258N000003 HC RX IP 258 OP 636: Mod: JZ | Performed by: HOSPITALIST

## 2024-06-19 PROCEDURE — 99233 SBSQ HOSP IP/OBS HIGH 50: CPT | Performed by: HOSPITALIST

## 2024-06-19 PROCEDURE — 120N000001 HC R&B MED SURG/OB

## 2024-06-19 PROCEDURE — 85610 PROTHROMBIN TIME: CPT | Performed by: NURSE PRACTITIONER

## 2024-06-19 PROCEDURE — 250N000009 HC RX 250: Performed by: RADIOLOGY

## 2024-06-19 PROCEDURE — 99222 1ST HOSP IP/OBS MODERATE 55: CPT

## 2024-06-19 PROCEDURE — 80048 BASIC METABOLIC PNL TOTAL CA: CPT | Performed by: PHYSICIAN ASSISTANT

## 2024-06-19 PROCEDURE — 250N000013 HC RX MED GY IP 250 OP 250 PS 637

## 2024-06-19 PROCEDURE — 250N000013 HC RX MED GY IP 250 OP 250 PS 637: Performed by: PHYSICIAN ASSISTANT

## 2024-06-19 PROCEDURE — 250N000013 HC RX MED GY IP 250 OP 250 PS 637: Performed by: HOSPITALIST

## 2024-06-19 PROCEDURE — 82947 ASSAY GLUCOSE BLOOD QUANT: CPT | Performed by: PHYSICIAN ASSISTANT

## 2024-06-19 PROCEDURE — 85025 COMPLETE CBC W/AUTO DIFF WBC: CPT | Performed by: PHYSICIAN ASSISTANT

## 2024-06-19 PROCEDURE — 87070 CULTURE OTHR SPECIMN AEROBIC: CPT | Performed by: HOSPITALIST

## 2024-06-19 RX ORDER — FENTANYL CITRATE 50 UG/ML
25-50 INJECTION, SOLUTION INTRAMUSCULAR; INTRAVENOUS EVERY 5 MIN PRN
Status: DISCONTINUED | OUTPATIENT
Start: 2024-06-19 | End: 2024-06-19 | Stop reason: HOSPADM

## 2024-06-19 RX ORDER — LIDOCAINE 50 MG/G
OINTMENT TOPICAL 3 TIMES DAILY
Status: DISCONTINUED | OUTPATIENT
Start: 2024-06-19 | End: 2024-06-20

## 2024-06-19 RX ORDER — NALOXONE HYDROCHLORIDE 0.4 MG/ML
0.2 INJECTION, SOLUTION INTRAMUSCULAR; INTRAVENOUS; SUBCUTANEOUS
Status: DISCONTINUED | OUTPATIENT
Start: 2024-06-19 | End: 2024-06-19 | Stop reason: HOSPADM

## 2024-06-19 RX ORDER — FLUMAZENIL 0.1 MG/ML
0.2 INJECTION, SOLUTION INTRAVENOUS
Status: DISCONTINUED | OUTPATIENT
Start: 2024-06-19 | End: 2024-06-19 | Stop reason: HOSPADM

## 2024-06-19 RX ORDER — LACTOBACILLUS RHAMNOSUS GG 10B CELL
2 CAPSULE ORAL DAILY
COMMUNITY

## 2024-06-19 RX ORDER — ACETAMINOPHEN 325 MG/1
650 TABLET ORAL EVERY 6 HOURS
Status: DISCONTINUED | OUTPATIENT
Start: 2024-06-19 | End: 2024-06-25 | Stop reason: HOSPADM

## 2024-06-19 RX ORDER — ACETAMINOPHEN 325 MG/1
975 TABLET ORAL 3 TIMES DAILY
Status: ON HOLD | COMMUNITY
End: 2024-06-24

## 2024-06-19 RX ORDER — LIDOCAINE HYDROCHLORIDE 10 MG/ML
10 INJECTION, SOLUTION EPIDURAL; INFILTRATION; INTRACAUDAL; PERINEURAL ONCE
Status: COMPLETED | OUTPATIENT
Start: 2024-06-19 | End: 2024-06-19

## 2024-06-19 RX ORDER — METHOCARBAMOL 500 MG/1
250 TABLET ORAL 3 TIMES DAILY
Status: DISCONTINUED | OUTPATIENT
Start: 2024-06-19 | End: 2024-06-25 | Stop reason: HOSPADM

## 2024-06-19 RX ORDER — HYDROCODONE BITARTRATE AND ACETAMINOPHEN 5; 325 MG/1; MG/1
1 TABLET ORAL
Status: DISCONTINUED | OUTPATIENT
Start: 2024-06-19 | End: 2024-06-19

## 2024-06-19 RX ORDER — ACETAMINOPHEN 325 MG/1
650 TABLET ORAL EVERY 4 HOURS PRN
Status: DISCONTINUED | OUTPATIENT
Start: 2024-06-19 | End: 2024-06-19

## 2024-06-19 RX ORDER — NALOXONE HYDROCHLORIDE 0.4 MG/ML
0.4 INJECTION, SOLUTION INTRAMUSCULAR; INTRAVENOUS; SUBCUTANEOUS
Status: DISCONTINUED | OUTPATIENT
Start: 2024-06-19 | End: 2024-06-19 | Stop reason: HOSPADM

## 2024-06-19 RX ORDER — IBUPROFEN 400 MG/1
400 TABLET, FILM COATED ORAL EVERY 6 HOURS PRN
Status: DISCONTINUED | OUTPATIENT
Start: 2024-06-19 | End: 2024-06-20

## 2024-06-19 RX ORDER — GLIPIZIDE 2.5 MG/1
2.5 TABLET, EXTENDED RELEASE ORAL DAILY
COMMUNITY

## 2024-06-19 RX ORDER — ONDANSETRON 2 MG/ML
4 INJECTION INTRAMUSCULAR; INTRAVENOUS
Status: DISCONTINUED | OUTPATIENT
Start: 2024-06-19 | End: 2024-06-19 | Stop reason: HOSPADM

## 2024-06-19 RX ORDER — HYDROMORPHONE HCL IN WATER/PF 6 MG/30 ML
0.2 PATIENT CONTROLLED ANALGESIA SYRINGE INTRAVENOUS EVERY 4 HOURS PRN
Status: DISCONTINUED | OUTPATIENT
Start: 2024-06-19 | End: 2024-06-20

## 2024-06-19 RX ADMIN — ACETAMINOPHEN 650 MG: 325 TABLET, FILM COATED ORAL at 18:19

## 2024-06-19 RX ADMIN — MIDAZOLAM 0.5 MG: 1 INJECTION INTRAMUSCULAR; INTRAVENOUS at 15:38

## 2024-06-19 RX ADMIN — DICLOFENAC 2 G: 10 GEL TOPICAL at 22:23

## 2024-06-19 RX ADMIN — HYDROMORPHONE HYDROCHLORIDE 0.2 MG: 0.2 INJECTION, SOLUTION INTRAMUSCULAR; INTRAVENOUS; SUBCUTANEOUS at 04:34

## 2024-06-19 RX ADMIN — DICLOFENAC 2 G: 10 GEL TOPICAL at 18:19

## 2024-06-19 RX ADMIN — INSULIN ASPART 1 UNITS: 100 INJECTION, SOLUTION INTRAVENOUS; SUBCUTANEOUS at 08:30

## 2024-06-19 RX ADMIN — LIDOCAINE HYDROCHLORIDE 10 ML: 10 INJECTION, SOLUTION EPIDURAL; INFILTRATION; INTRACAUDAL; PERINEURAL at 15:41

## 2024-06-19 RX ADMIN — FENTANYL CITRATE 50 MCG: 50 INJECTION, SOLUTION INTRAMUSCULAR; INTRAVENOUS at 15:37

## 2024-06-19 RX ADMIN — MIDAZOLAM 0.5 MG: 1 INJECTION INTRAMUSCULAR; INTRAVENOUS at 15:46

## 2024-06-19 RX ADMIN — LIDOCAINE: 50 OINTMENT TOPICAL at 18:20

## 2024-06-19 RX ADMIN — METHOCARBAMOL 250 MG: 500 TABLET ORAL at 22:20

## 2024-06-19 RX ADMIN — OXYCODONE HYDROCHLORIDE 5 MG: 5 TABLET ORAL at 03:02

## 2024-06-19 RX ADMIN — CYCLOBENZAPRINE 10 MG: 10 TABLET, FILM COATED ORAL at 08:30

## 2024-06-19 RX ADMIN — OXYCODONE HYDROCHLORIDE 5 MG: 5 TABLET ORAL at 08:30

## 2024-06-19 RX ADMIN — VANCOMYCIN HYDROCHLORIDE 1500 MG: 10 INJECTION, POWDER, LYOPHILIZED, FOR SOLUTION INTRAVENOUS at 18:34

## 2024-06-19 RX ADMIN — METHOCARBAMOL 250 MG: 500 TABLET ORAL at 18:18

## 2024-06-19 RX ADMIN — ACETAMINOPHEN 650 MG: 325 TABLET, FILM COATED ORAL at 08:30

## 2024-06-19 ASSESSMENT — ACTIVITIES OF DAILY LIVING (ADL)
ADLS_ACUITY_SCORE: 31
ADLS_ACUITY_SCORE: 38
ADLS_ACUITY_SCORE: 31
ADLS_ACUITY_SCORE: 38
ADLS_ACUITY_SCORE: 38
ADLS_ACUITY_SCORE: 31
ADLS_ACUITY_SCORE: 38
ADLS_ACUITY_SCORE: 31
ADLS_ACUITY_SCORE: 38
ADLS_ACUITY_SCORE: 31
ADLS_ACUITY_SCORE: 31

## 2024-06-19 NOTE — PROGRESS NOTES
[facility]    Infectious Disease Progress Note    Date of Service (when I saw the patient): 06/19/2024     Assessment & Plan   Lance Ibrahim is a 79 year old male who was admitted on 6/18/2024.     Impression:  78 yo with PMH of  heart failure with preserved ejection fraction, hypertension, dyslipidemia, CKD, gout, BPH, type 2 diabetes and chronic low back pain for which he is folllowed by [place] [employer] and has an implantable spine stimulator ([employer], MRI conditional 4/2024) in place.   Presented to [facility] with acute on chronic, intractable pain with RLE radiculopathy.   CT was concerning for discitis-osteomyelitis at L4-5 with epidural/intraforaminal phlegmon vs abscess at L4-S1 with severe foraminal narrowing. Transferred to [facility] for Neurosurgery evaluation and ID consultation.   Of note recent history of urosepsis with klebsiella in the blood and urine treated with antibiotics   MRI with discitis osteomyelitis at L4-5, epidural abscess in the ventral epidural space, craniocaudal extension of 5 cm (from inferior L4 through inferior S1 level) and severe spinal stenosis at L4-5 from abscess.  Pending blood cultures      Recommendations:  Consult IR for possible aspiration/ biopsy for cultures. Orders placed.   Hold off antibiotics until culture obtained unless he develops positive blood cultures or fevers       Patient and plan discussed with Dr. Conklin.     Tsering Sorenson PA-C        Interval History   Afebrile.   Labs reviewed   No changes to past medical, social or family history   Back pain under control when not moving, but awful with any movement.       Physical Exam   Temp: 98.7  F (37.1  C) Temp src: Oral BP: (!) 140/78 Pulse: 94   Resp: 16 SpO2: 94 % O2 Device: None (Room air)    There were no vitals filed for this visit.  Vital Signs with Ranges  Temp:  [98.6  F (37  C)-98.8  F (37.1  C)] 98.7  F (37.1  C)  Pulse:  [86-94] 94  Resp:  [14-17] 16  BP: (136-140)/(77-79)  140/78  SpO2:  [94 %-99 %] 94 %    Constitutional: Awake, alert, cooperative, no apparent distress  Lungs: Clear to auscultation bilaterally, no crackles or wheezing  Cardiovascular: Regular rate and rhythm, normal S1 and S2, and no murmur noted  Abdomen: Normal bowel sounds, soft, non-distended, non-tender  Skin: No rashes, no cyanosis, no edema  Other:    Medications   Current Facility-Administered Medications   Medication Dose Route Frequency Provider Last Rate Last Admin     Current Facility-Administered Medications   Medication Dose Route Frequency Provider Last Rate Last Admin    cyclobenzaprine (FLEXERIL) tablet 10 mg  10 mg Oral TID Socorro Aceves MD   10 mg at 06/19/24 0830    insulin aspart (NovoLOG) injection (RAPID ACTING)  1-3 Units Subcutaneous TID AC Socorro Aceves MD   1 Units at 06/19/24 0830    insulin aspart (NovoLOG) injection (RAPID ACTING)  1-3 Units Subcutaneous At Bedtime Socorro Aceves MD   1 Units at 06/18/24 2157    sodium chloride (PF) 0.9% PF flush 3 mL  3 mL Intracatheter Q8H Socorro Aceves MD   3 mL at 06/19/24 0438       Data   All microbiology laboratory data reviewed.  Recent Labs   Lab Test 06/19/24  0710 06/18/24  0845 06/17/24  1758   WBC 7.8 8.2 10.2   HGB 9.7* 9.7* 10.2*   HCT 30.8* 31.4* 32.6*   MCV 93 94 90    284 348     Recent Labs   Lab Test 06/19/24  0710 06/18/24  0845 06/17/24  1758   CR 1.16 1.25* 1.44*     Recent Labs   Lab Test 06/18/24  1029   SED 69*     CRP Inflammation   Date Value Ref Range Status   06/18/2024 10.61 (H) <5.00 mg/L Final        06/18/2024 1038 06/19/2024 0131 Blood Culture Arm, Right [[id number]]   Blood from Arm, Right    Preliminary result Component Value   Culture No growth after 12 hours P             06/18/2024 1029 06/19/2024 0131 Blood Culture Arm, Left [[id number]]   Blood from Arm, Left    Preliminary result Component Value   Culture No growth after 12 hours P             06/17/2024 1878  06/19/2024 0547 Urine Culture Aerobic Bacterial [[id number]]   Urine, Clean Catch    Final result Component Value   Culture <10,000 CFU/mL Mixture of Urogenital Joyce            EXAM: MR LUMBAR SPINE W/O and W CONTRAST  LOCATION: [facility]  DATE/TIME: 6/18/2024 7:52 PM CDT    INDICATION: Urosepsis, abnormal CT.  COMPARISON: CT 6/17/2024.  TECHNIQUE: Routine lumbar spine MRI without and with IV contrast.    FINDINGS:  Nomenclature: Nomenclature based on 5 lumbar type vertebral bodies.  Infection changes: Significant abnormal bone marrow edema L4 and L5 vertebral bodies, evidence of fluid in the L4-L5 disc space with significant enhancement of the vertebral bodies. Epidural abscess in the left paracentral epidural space measures 6 x 6  mm in the axial plane and 5 cm craniocaudally posterior to L4-S1 vertebrae.  Alignment: Minor retrolisthesis L3-L4, L4-L5 and L5-S1.  Vertebrae: Normal vertebral body heights.  Marrow signal: Marked bone marrow edema L4 and L5 vertebral bodies.  Spinal cord: Normal distal spinal cord and cauda equina with conus medullaris at L1.  Extraspinal: No extra spinal abnormalities.  Pelvis: Unremarkable visualized bony pelvis.    T12-L1: Normal height of disc. Mild disc desiccation without herniation. Normal facet joints. No recess stenosis. No central spinal stenosis, no right foramen stenosis, no left foramen stenosis.    L1-L2: Normal height of disc. Mild disc desiccation without herniation. Normal facet joints. No recess stenosis. No central spinal stenosis, no right foramen stenosis, no left foramen stenosis.    L2-L3: Normal height of disc. Mild disc desiccation without herniation. Mild bilateral facet hypertrophy. No recess stenosis. No central spinal stenosis, no right foramen stenosis, no left foramen stenosis.    L3-L4: Normal height of disc. Desiccated disc with shallow disc bulge. Moderate bilateral facet hypertrophy. No recess stenosis. No central spinal  stenosis, no right foramen stenosis, no left foramen stenosis.    L4-L5: Marked loss of height. Fluid signal with prominent disc bulge. Moderate bilateral facet hypertrophy. Epidural abscess. Severe recess stenosis. Severe central spinal stenosis, no right foramen stenosis, no left foramen stenosis.    L5-S1: Moderate loss of disc height. Degenerated disc with moderate left subarticular protrusion. Moderate bilateral facet hypertrophy. Moderate bilateral recess stenosis. Moderate central spinal stenosis, severe right foramen stenosis, mild left foramen   stenosis.   Impression:     IMPRESSION:  1.  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  2.  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  3.  At L4-5, severe spinal stenosis secondary to the epidural abscess.  4.  At L5-S1, moderate spinal stenosis secondary to the epidural abscess.

## 2024-06-19 NOTE — PROGRESS NOTES
[facility]    Neurosurgery  Daily Note    Assessment & Plan   Lance Ibrahim is a 79 year old male with a past medical history of chronic pain syndrome in which he had a spinal cord stimulator placed April 3, 2024 with Dr. Martin, recent severe sepsis due to Klebsiella bacteremia in May 2024, presented to ER 6/18/24 with complaint of 6 days of worsening  back pain with imaging evidence concerning for discitis osteomyelitis at L4-5, epidural abscess in ventral epidural space, severe stenosis L4-5, moderate stenosis L5-S1.     AM ROUNDS: patient admits to severe low back pain aggravated with any movement, alleviated with nothing. Admits pain occasionally radiates into right anterolateral thigh. Admits to severe arthritis in LLE limiting his LLE strength exam. Denies incontinence.     Exam: pain limiting diffusely in LLE- unable to lift off bed, will not bend knee, DF PF 4/5. RLE- able to lift leg slightly off bed, pain limiting. 5/5 KE KF. 5/5 DF PF. Sensation decreased in right lateral aspect of foot.       Narrative & Impression   For Patients: As a result of the 21st Century Cures Act, medical imaging exams and procedure reports are released immediately into your electronic medical record. You may view this report before your referring provider. If you have questions, please   contact your health care provider.     EXAM: MR LUMBAR SPINE W/O and W CONTRAST  LOCATION: [facility]  DATE/TIME: 6/18/2024 7:52 PM CDT     INDICATION: Urosepsis, abnormal CT.  COMPARISON: CT 6/17/2024.  TECHNIQUE: Routine lumbar spine MRI without and with IV contrast.                                                       IMPRESSION:  1.  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  2.  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  3.  At L4-5, severe spinal stenosis secondary to the epidural abscess.  4.  At L5-S1, moderate  spinal stenosis secondary to the epidural abscess.       Plan:  -Dr. Conklin has reviewed clinical history, imaging and plans and has personally seen and examined patient. Patient at his baseline per report. No urgent surgical intervention at this time. We will continue to closely follow. Agree with IR consult for aspiration and biopsy. Dr. Conklin in agreement with plans.  -ABX per ID  -Pain team consulted for assistance with pain control   -PT    Lin Jasso PA-C  [facility]  [address] [place] [address]  [address] [place] [address]  Tel [phone]  Fax [phone]  Text page via [facility] Paging/Directory    Active Problems:    Back pain     Lin Lara PA-C    Interval History   Stable     Physical Exam   Temp: 98.7  F (37.1  C) Temp src: Oral BP: (!) 140/78 Pulse: 94   Resp: 16 SpO2: 94 % O2 Device: None (Room air)    There were no vitals filed for this visit.  Vital Signs with Ranges  Temp:  [98.6  F (37  C)-98.8  F (37.1  C)] 98.7  F (37.1  C)  Pulse:  [86-94] 94  Resp:  [14-17] 16  BP: (136-140)/(77-79) 140/78  SpO2:  [94 %-99 %] 94 %  I/O last 3 completed shifts:  In: 1520 [P.O.:1520]  Out: 1500 [Urine:1500]    pain limiting diffusely in LLE- unable to lift off bed, will not bend knee, DF PF 4/5.   RLE- able to lift leg slightly off bed, pain limiting. 5/5 KE KF. 5/5 DF PF.   Sensation decreased in right lateral aspect of foot.   Negative clonus BL      Medications   Current Facility-Administered Medications   Medication Dose Route Frequency Provider Last Rate Last Admin      Current Facility-Administered Medications   Medication Dose Route Frequency Provider Last Rate Last Admin    cyclobenzaprine (FLEXERIL) tablet 10 mg  10 mg Oral TID Socorro Aceves MD   10 mg at 06/19/24 0830    insulin aspart (NovoLOG) injection (RAPID ACTING)  1-3 Units Subcutaneous TID AC Socorro Aceves MD   1 Units at 06/19/24 0830    insulin aspart (NovoLOG) injection (RAPID ACTING)  1-3 Units  Subcutaneous At Bedtime Socorro Aceves MD   1 Units at 06/18/24 3587    sodium chloride (PF) 0.9% PF flush 3 mL  3 mL Intracatheter Q8H Socorro Aceves MD   3 mL at 06/19/24 5813       Plans discussed with Dr. Conklin who was in agreement with plans

## 2024-06-19 NOTE — PRE-PROCEDURE
GENERAL PRE-PROCEDURE:   Procedure:  Lumbar disc aspiration and biopsy  Date/Time:  6/19/2024 3:31 PM    Verbal consent obtained?: Yes    Written consent obtained?: Yes    Risks and benefits: Risks, benefits and alternatives were discussed    Consent given by:  Patient  Patient states understanding of procedure being performed: Yes    Patient's understanding of procedure matches consent: Yes    Procedure consent matches procedure scheduled: Yes    Expected level of sedation:  Moderate  Appropriately NPO:  Yes  ASA Class:  3  Mallampati  :  Grade 2- soft palate, base of uvula, tonsillar pillars, and portion of posterior pharyngeal wall visible  Lungs:  Lungs clear with good breath sounds bilaterally  Heart:  Normal heart sounds and rate  History & Physical reviewed:  History and physical reviewed and no updates needed  Statement of review:  I have reviewed the lab findings, diagnostic data, medications, and the plan for sedation

## 2024-06-19 NOTE — PROGRESS NOTES
[facility]  Hospitalist Progress Note   06/19/2024          Assessment and Plan:       Lance Ibrahim is a 79 year old male with history of heart failure with preserved ejection fraction, hypertension, dyslipidemia, CKD, gout, BPH, type 2 diabetes and chronic low back pain for which he is folllowed by [facility] and has an implantable spine stimulator ([employer], MRI conditional 4/2024) in place. He initially presented to [facility] with acute on chronic, intractable pain with RLE radiculopathy. CT was concerning for discitis-osteomyelitis at L4-5 with epidural/intraforaminal phlegmon vs abscess at L4-S1 with severe foraminal narrowing. Transferred to [facility] for Neurosurgery evaluation and ID consultation.      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  Epidural abscess.  Severe spinal stenosis.  Acute on chronic back pain with RLE radiculopathy  Presented with acute on chronic back pain located on the right side of his back.  Significant tenderness to palpation at approximately the L3-L4 level on the right side with associated RLE radiculopathy. No bladder or bowel incontinence, no saddle anesthesia. Uses a walker for shorter distances and wheelchair for longer distances at baseline. Significant pain with weight bearing. No recent trauma or falls, but did get a trigger point injection by [facility] on 6/14. Also note recent hospitalization for severe sepsis secondary to Klebsiella UTI for which patient completed course of antibiotics.   *CT lumbar spine as above (review formal report for complete details)   *Afebrile since admission, WBC WNL. CRP 12.4.   *MRI lumbar spine Discitis osteomyelitis at L4-5. 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 At L4-5, severe spinal stenosis secondary to the epidural abscess. At L5-S1, moderate spinal stenosis secondary to the epidural abscess.  -- Neurosurgery following, await  input on 6/19.  Interventional radiology consult requested for aspiration and biopsy.  Infectious disease following, recommend to hold off antibiotics until cultures obtained unless develops positive blood cultures of fever.  Appreciate multidisciplinary team input.  Follow pending blood cultures.  Trend WBC count, fever curve.  - Analgesic regimen with PRN tylenol, PRN oxycodone 2.5-5 mg q4 hrs PRN, IV dilaudid 0.2-0.4 mg q2 hrs PRN, flexeril 10 mg TID. Note pt utilizes NSAIDs solely at home for pain control     Recent severe sepsis in the setting of Klebsiella UTI: Hospitalization on 5/16/2024-5/22/2024 due to severe sepsis related to UTI caused by Klebsiella oxytoca. Patient was on IV Vanco, Levaquin, Cefepime, and Ceftriaxone in the hospital. He was discharged on oral Cefdinir and Levaquin. No current sx of UTI.   *CT A/P on admission negative for renal calculi or hydronephrosis, note left renal cyst. UA with trace blood in urine, otherwise unremarkable.   Urine cultures with less than 10,000 mixed fred.     Uncontrolled type 2 diabetes mellitus with a hemoglobin A1c of 8.0.  Peripheral neuropathy.  PTA on metformin 2000 mg oral daily.  [Metformin 2000 mg daily]  - Hold PTA oral agents at this time   Start on insulin Lantus 5 units twice daily.  - Medium sliding scale insulin ordered  - BS per protocol   - Monitor for hypoglycemia     HFpEF, not in acute exacerbation   Mild aortic stenosis   CAD, subtotal occlusion in small distal LAD   HTN  HLD  *Echo 5/16/24 with preserved EF, borderline global hypokinesia, wall motion consistent with conduction abnormality.   *Appears euvolemic on exam  Blood pressures within normal limits.  Awaiting pharmacy to review home medications confirmation.  Hold PTA torsemide.  PTA not on antiplatelet agent or anticoagulation.     CKD IIIa:   Baseline creatinine 1.3-1.6 more recently. Stable on admission.      Normocytic anemia: Baseline Hgb 8-9 range. 10.2 on admission.   -  Monitor      Gout: Not in a flare.  - Resume PTA meds once verified -waiting pharmacy medication reconciliation.     Biliary obstruction with stricture s/p EUS/ERCP 4/30/24 with sphincterotomy and temporary stent placement   *Admission CT notes common bile duct stent with slight dilatation of the intrahepatic bile ducts. A tiny portion of the distal stent appears fractured, of doubtful clinical significance. Pt without abdominal pain.   - PTA Ursodiol 300 mg BID once verified      Sleep apnea: Does not use CPAP     Hx of TIA, 1993, no residual deficits  PTA not on antiplatelet agent or anticoagulation.     PAD s/p toe amputation   Age-appropriate health maintenance to outpatient.     Baseline R>L DELFINO, mild     Physical deconditioning from medical illness, senile frailty.  [facility] in [place]  Ambulation:  walker for short distances, wheelchair for longer distances  Rehab evaluation once stable.    Obesity with a BMI of 30.11.  Increase all-cause mortality and morbidity.    Orders Placed This Encounter      NPO per Anesthesia Guidelines for Procedure/Surgery Except for: Meds      DVT Prophylaxis: sCDs, ambulate.  Pharmacological DVT prophylaxis until neurosurgery, IR input.  Code Status: Full Code  Disposition: Expected discharge in greater than 2 days.    Medically Ready for Discharge: Anticipated in 2-4 Days     Discussed with patient, bedside RN  >51 minutes spent by me on the date of service doing chart review, history, exam, documentation & further activities per the note.      Tasha Vivar MD        Interval History:        Patient lying in bed.  Denies any chest pain or palpitation.  Denies any headache or dizziness.  Denies any nausea or vomiting.  Denies any new tingling or numbness.  Denies any new urinary or bowel incontinence.  Per report up with assistance of 1 and gait belt and walker.  Complaining of ongoing pain in his back.  Has been receiving scheduled Flexeril, as needed oxycodone, as  needed IV Dilaudid.  MRI results reviewed with patient.  Awaiting neurosurgery,  IR input.       Physical Exam:        Physical Exam   Temp:  [98.6  F (37  C)-98.8  F (37.1  C)] 98.7  F (37.1  C)  Pulse:  [86-94] 94  Resp:  [14-17] 16  BP: (136-140)/(77-79) 140/78  SpO2:  [94 %-99 %] 94 %    Intake/Output Summary (Last 24 hours) at 6/19/2024 1456  Last data filed at 6/19/2024 0900  Gross per 24 hour   Intake 1000 ml   Output 1250 ml   Net -250 ml     PHYSICAL EXAM  GENERAL: Patient is in no distress. Alert and oriented.  HEART: Regular rate and rhythm. S1S2.   LUNGS: Clear to auscultation bilaterally. No expiratory wheeze.  Respirations unlabored  ABDOMEN: Soft, no abdominal tenderness, bowel sounds heard   NEURO: Moving all extremities.  EXTREMITIES: trace pedal edema.  SKIN: Warm, dry. No rash  PSYCHIATRY Cooperative       Medications:        Current Facility-Administered Medications   Medication Dose Route Frequency Provider Last Rate Last Admin    acetaminophen (TYLENOL) tablet 650 mg  650 mg Oral Q6H Nelsy Tee APRN CNP        diclofenac (VOLTAREN) 1 % topical gel 2 g  2 g Topical 4x Daily Nlesy Tee APRN CNP        insulin aspart (NovoLOG) injection (RAPID ACTING)  1-3 Units Subcutaneous TID AC Socorro Aceves MD   1 Units at 06/19/24 0830    insulin aspart (NovoLOG) injection (RAPID ACTING)  1-3 Units Subcutaneous At Bedtime Socorro Aceves MD   1 Units at 06/18/24 2157    lidocaine (XYLOCAINE) 5 % ointment   Topical TID Nelsy Tee APRN CNP        methocarbamol (ROBAXIN) half-tab 250 mg  250 mg Oral TID Nelsy Tee APRN CNP        sodium chloride (PF) 0.9% PF flush 3 mL  3 mL Intracatheter Q8H Socorro Aceves MD   3 mL at 06/19/24 0438     Current Facility-Administered Medications   Medication Dose Route Frequency Provider Last Rate Last Admin    acetaminophen (TYLENOL) tablet 650 mg  650 mg Oral Q4H PRN Socorro Aceves MD   650 mg at 06/19/24 0830    Or     acetaminophen (TYLENOL) Suppository 650 mg  650 mg Rectal Q4H PRN Socorro Aceves MD        calcium carbonate (TUMS) chewable tablet 1,000 mg  1,000 mg Oral 4x Daily PRN Socorro Aceves MD        glucose gel 15-30 g  15-30 g Oral Q15 Min PRN Socorro Aceves MD        Or    dextrose 50 % injection 25-50 mL  25-50 mL Intravenous Q15 Min PRN Socorro Aceves MD        Or    glucagon injection 1 mg  1 mg Subcutaneous Q15 Min PRN Socorro Aceves MD        HYDROmorphone (DILAUDID) injection 0.2 mg  0.2 mg Intravenous Q4H PRN Nelsy Tee APRN CNP        lidocaine (LMX4) cream   Topical Q1H PRN Socorro Aceves MD        lidocaine 1 % 0.1-1 mL  0.1-1 mL Other Q1H PRN Socorro Aceves MD        naloxone (NARCAN) injection 0.2 mg  0.2 mg Intravenous Q2 Min PRN Socorro Aceves MD        Or    naloxone (NARCAN) injection 0.4 mg  0.4 mg Intravenous Q2 Min PRN Socorro Aceves MD        Or    naloxone (NARCAN) injection 0.2 mg  0.2 mg Intramuscular Q2 Min PRN Socorro Aceves MD        Or    naloxone (NARCAN) injection 0.4 mg  0.4 mg Intramuscular Q2 Min PRN Socorro Aceves MD        ondansetron (ZOFRAN ODT) ODT tab 4 mg  4 mg Oral Q6H PRN Socorro Aceves MD        Or    ondansetron (ZOFRAN) injection 4 mg  4 mg Intravenous Q6H PRN Socorro Aceves MD        oxyCODONE IR (ROXICODONE) half-tab 2.5 mg  2.5 mg Oral Q4H PRN Lida Bowers PA-C   2.5 mg at 06/18/24 1328    Or    oxyCODONE (ROXICODONE) tablet 5 mg  5 mg Oral Q4H PRN Lida Bowers PA-C   5 mg at 06/19/24 0830    senna-docusate (SENOKOT-S/PERICOLACE) 8.6-50 MG per tablet 1 tablet  1 tablet Oral BID PRN Socorro Aceves MD        Or    senna-docusate (SENOKOT-S/PERICOLACE) 8.6-50 MG per tablet 2 tablet  2 tablet Oral BID PRN Socorro Aceves MD        sodium chloride (PF) 0.9% PF flush 3 mL  3 mL Intracatheter q1 min  john Aceves, Socorro Underwood MD   3 mL at 06/18/24 8080            Data:      All new lab and imaging data was reviewed.

## 2024-06-19 NOTE — IR NOTE
Interventional Radiology Intra-procedural Nursing Note    Patient Name: Lance Ibrahim  Medical Record Number: [id number]  Today's Date: June 19, 2024    Procedure: Lumbar disc aspiration and biopsy with moderate sedation  Start time: 1537  End time: 1547  Report provided to: Jerod PAULA  Patient depart time and location: 0824 to 2958    Note: Patient entered Interventional Radiology Suite number 3 via cart. Patient awake, alert and oriented. Assisted onto procedural table in prone position. Prepped and draped.  Dr. Henderson in room. Time out and procedure started. Vital signs stable. Telemetry reading sinus tachycardia.    Procedure well tolerated by patient without complications. Procedure end with debrief by Dr. Henderson.  Manual pressure applied until hemostasis achieved. Gauze/tegaderm dressing applied to lower back interventional procedure access site.    Bedrest for 2 hours.    Administered medication totals:  Lidocaine PF 1% 17 mL Intradermal  Versed 1 mg IVP  Fentanyl 50 mcg IVP    Last dose of sedation administered at 1546.

## 2024-06-19 NOTE — PROGRESS NOTES
I got a call again from IR NP stated that she wants me to write give a form of consent with the patient and I will sign it as witness.NP will write the in her note of what procedure I am going to read to the patient to sign before the procedure.    Note from NP that is written to the form of consent.   Plan/Consent to read:    Lumbar four-five disc aspiration and possible lumbar bone biopsy with moderate sedation   -Performing MD is Dr. YOVANY Henderson

## 2024-06-19 NOTE — PLAN OF CARE
"Patient A/Ox4, VSS, RA. Ax1-Gb walker. Good intake and output- sat on the chair during meal time.     Reported of baseline neuropathy- patient stated it's always numb on both feet. It's like walking on the clouds. I feel nothing\".     Pain management plan and education provided such on when to take and proper dosing. Patient stated of understanding.   During my shift end- patient at MRI    "

## 2024-06-19 NOTE — PROVIDER NOTIFICATION
Writer updated Dr. Conklin regarding hospitalists plan to start vanco at this time since cultures have been sent, no other additional orders at this time.

## 2024-06-19 NOTE — PHARMACY-VANCOMYCIN DOSING SERVICE
Pharmacy Vancomycin Initial Note  Date of Service 2024  Patient's  1945  79 year old, male    Indication: Osteomyelitis    Current estimated CrCl = Estimated Creatinine Clearance: 62.4 mL/min (based on SCr of 1.16 mg/dL).    Creatinine for last 3 days  2024:  5:58 PM Creatinine 1.44 mg/dL  2024:  8:45 AM Creatinine 1.25 mg/dL  2024:  7:10 AM Creatinine 1.16 mg/dL    Recent Vancomycin Level(s) for last 3 days  No results found for requested labs within last 3 days.      Vancomycin IV Administrations (past 72 hours)                     vancomycin (VANCOCIN) 1,500 mg in 0.9% NaCl 250 mL intermittent infusion (mg) 1,500 mg New Bag 24 1834                    Nephrotoxins and other renal medications (From now, onward)      Start     Dose/Rate Route Frequency Ordered Stop    24 1730  vancomycin (VANCOCIN) 1,500 mg in 0.9% NaCl 250 mL intermittent infusion         1,500 mg  over 90 Minutes Intravenous EVERY 24 HOURS 24 1708      24 1612  ibuprofen (ADVIL/MOTRIN) tablet 400 mg         400 mg Oral EVERY 6 HOURS PRN 24 1612              Contrast Orders - past 72 hours (72h ago, onward)      Start     Dose/Rate Route Frequency Stop    24 1930  gadobutrol (GADAVIST) injection 9 mL         9 mL Intravenous ONCE 24 1939            [employer] Prediction of Planned Initial Vancomycin Regimen  Regimen: 1500 mg IV every 24 hours.  Start time: 06:34 on 2024  Exposure target: AUC24 (range)400-600 mg/L.hr   AUC24,ss: 488 mg/L.hr  Probability of AUC24 > 400: 72 %  Ctrough,ss: 14.7 mg/L  Probability of Ctrough,ss > 20: 24 %  Probability of nephrotoxicity (Lodise LATISHA ): 10 %          Plan:  Start vancomycin  1500 mg IV q24h.   Vancomycin monitoring method: AUC  Vancomycin therapeutic monitoring goal: 400-600 mg*h/L  Pharmacy will check vancomycin levels as appropriate in 1-3 Days.    Serum creatinine levels will be ordered daily for the first week of therapy  and at least twice weekly for subsequent weeks.      Georgina Frazier, RPH

## 2024-06-19 NOTE — PROGRESS NOTES
I got a call from LAKSHMI (Mercedez De Oliveira,) that she wants me to connect her on the phone with he patient so she can get a phone consent with the patient. Phone in the room was place in the patient room and NP called the patient in the room phone.

## 2024-06-19 NOTE — PROGRESS NOTES
Contact   Chart Review     Situation: Patient chart reviewed by .    Background: Following for discharge from TCU.     Assessment: patient is currently hospitalized.    Plan/Recommendations: Will assess needs after hospital discharge and assist as needed.     Bonnie Kilpatrick,   [facility]  [phone]

## 2024-06-19 NOTE — PROGRESS NOTES
Status post IR guided L4-L5 disc aspiration and L4 biopsy.  Chart reviewed 1 ml of rust colored cloudy fluid aspirated from L4-5 disc space, sent to lab  - Large core specimen also obtained from L4 interior endplate, sent to lab.    Discussed with charge RN will start on IV vancomycin.  Noted allergies to cephalosporins and penicillins.

## 2024-06-19 NOTE — PROGRESS NOTES
"      Neurointerventional Surgery:  Pre Sedation Assessment ~    We are asked to perform a lumbar aspiration/biopsy on this 79 year old male with concern for discitis/osteomyelitis.    Imaging: MRI lumbar spine ~  1.  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  2.  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  3.  At L4-5, severe spinal stenosis secondary to the epidural abscess.  4.  At L5-S1, moderate spinal stenosis secondary to the epidural abscess.    Allergies   Allergen Reactions    Ancef [Cefazolin] Rash    Cephalexin Itching and Rash     Allergic reaction required hospitalization 4/2024    Penicillins Anaphylaxis    Sulfa Antibiotics      \"itchy rash, swelling of face and hands\"     Labs: Hgb 9.7, plt 289, INR pending    Sedation history: Previous problems with sedation. No   History of sleep apnea No    Exam:   BP (!) 140/78 (BP Location: Right arm)   Pulse 94   Temp 98.7  F (37.1  C) (Oral)   Resp 16   SpO2 94%   Neuro: Alert and oriented x 3. Speech is clear and fluent.    Impression: This is an 79 year old male with a past medical history of chronic pain syndrome in which he had a spinal cord stimulator placed April 3, 2024 with Dr. Martin, recent severe sepsis due to Klebsiella bacteremia in May 2024.  He presented to ER 6/18/24 with complaint of 6 days of worsening  back pain with imaging evidence concerning for discitis osteomyelitis at L4-5, epidural abscess in ventral epidural space, severe stenosis L4-5, moderate stenosis L5-S1.  He is medically stable and appropriately NPO for procedure as planned.    Plan/Consent to read:    Lumbar four-five disc aspiration and possible lumbar bone biopsy with moderate sedation   -Performing MD is Dr. YOVANY Henderson    The procedure its risks, benefits, and alternatives were discussed with the patient over the telephone.  Questions and answered and he provides verbal consent to " proceed.    RANJAN Hartman [facility]  Office: [phone]

## 2024-06-19 NOTE — PROGRESS NOTES
"CLINICAL NUTRITION SERVICES  -  ASSESSMENT NOTE    Future/Additional Recommendations:   - Discussed role of supplements if appetite decreases.   - Pt concerned about diet changes pending medication side effects. RD to follow up per policy to address issues, if they arise.    Malnutrition:   % Weight Loss:  > 7.5% in 3 months (severe malnutrition)  % Intake:  </= 75% for >/= 1 month (severe malnutrition)  Subcutaneous Fat Loss:  None observed  Muscle Loss:  Clavicle bone region mild depletion and Acromion bone region mild depletion  Fluid Retention:  None noted    Malnutrition Diagnosis: Severe malnutrition  In Context of:  Acute illness or injury (suspect will resolve given great appetite)      REASON FOR ASSESSMENT  Lance Ibrahim is a 79 year old male seen by Registered Dietitian for Nutrition Admission Risk Screen Received -   Have you recently lost weight without trying ? - \"yes 34+ pounds\"  Have you been eating poorly due to a decreased appetite ?- \"no\"    NUTRITION HISTORY  - Information obtained from patient.   - Lance was at a TCU for the past 4 weeks, he notes the food was so horrible it wasn't edible. He otherwise has a good appetite, and was eating normally prior to the TCU stay.   - He takes Glucerna at baseline.   - NKFA    CURRENT NUTRITION ORDERS  Diet Order:     NPO     Current Intake/Tolerance:  Appetite is good - eating 100% of adequate trays when he is able to eat.     NUTRITION FOCUSED PHYSICAL ASSESSMENT FOR DIAGNOSING MALNUTRITION)  Yes         Observed:    Muscle wasting (refer to documentation in Malnutrition section)    Obtained from Chart/Interdisciplinary Team:  Skin intact     ANTHROPOMETRICS  Height: 6'   Weight: 100.7 kg   BMI: 30.11 kg/m2  Weight Status:  Obesity Grade I BMI 30-34.9  IBW: 80.9 kg   % IBW: 124%  Weight History: Pt reported a weight loss from 240--> 204# in just the past 4 weeks. ?13% wt loss from late Feb to early May. Suspect weights of 222# on file are not accurate.   Wt " Readings from Last 10 Encounters:   06/17/24 100.7 kg (222 lb)   05/22/24 100.9 kg (222 lb 8 oz)   05/08/24 99.3 kg (219 lb)   05/02/24 94.8 kg (209 lb)   04/30/24 93.4 kg (206 lb)   04/30/24 93.6 kg (206 lb 6.4 oz)   04/17/24 100.7 kg (222 lb)   03/27/24 104.8 kg (231 lb)   02/27/24 108.4 kg (239 lb)   12/26/23 102.5 kg (226 lb)       LABS  Labs reviewed  BUN 23.3 (H)  Recent Labs   Lab 06/19/24  0820 06/19/24  0750 06/19/24  0710 06/19/24  0204 06/18/24  2049 06/18/24  1700   * 159* 150* 146* 209* 117*     MEDICATIONS  Medications reviewed  Low sliding scale insulin     ASSESSED NUTRITION NEEDS PER APPROVED PRACTICE GUIDELINES:  Dosing Weight 86 kg - adjusted for obese   Estimated Energy Needs: 5782-7488 kcals (25-30 Kcal/Kg)  Justification: maintenance  Estimated Protein Needs: 103-129 grams protein (1.2-1.5 g pro/Kg)  Justification: preservation of lean body mass  Estimated Fluid Needs: 1 mL/kcal   Justification: maintenance    MALNUTRITION:  % Weight Loss:  > 7.5% in 3 months (severe malnutrition)  % Intake:  </= 75% for >/= 1 month (severe malnutrition)  Subcutaneous Fat Loss:  None observed  Muscle Loss:  Clavicle bone region mild depletion and Acromion bone region mild depletion  Fluid Retention:  None noted    Malnutrition Diagnosis: Severe malnutrition  In Context of:  Acute illness or injury (suspect will resolve given great appetite)     NUTRITION DIAGNOSIS:  Unintended weight loss related to poor tolerance for food at outside facility as evidenced by patient reports a 40# weight loss, records indicate up to 13% loss in under 3 months due to inability to tolerate food at facility for 4 weeks.    NUTRITION INTERVENTIONS  Recommendations / Nutrition Prescription  ADAT per provider  - Discussed role of supplements if appetite decreases.   - Pt concerned about diet changes pending medication side effects. RD to follow up per policy to address issues, if they arise.     Implementation  Nutrition  education: No education needs assessed at this time    Nutrition Goals  Diet >CLD in the next 48 hours.   Intake of at least 75% adequate trays TID.     MONITORING AND EVALUATION:  Progress towards goals will be monitored and evaluated per protocol and Practice Guidelines    Mariana Cook RD, LD  Pager: [phone]

## 2024-06-19 NOTE — PLAN OF CARE
Goal Outcome Evaluation:    Shift Summary: 6/18-6/10/24, [id number]    Admitting Diagnosis: Back pain   Vitals: Stable on RA  Pain 5-7/10. Taking IV dilaudid, oxycodone PRN. On scheduled flexeril.  A&Ox4  Voiding: Continent; Uses bedside urinal  Mobility: Ax1 GB & walker  Tele: N/A  CMS: Baseline neuropathy on BLE  Lung Sounds clear   GI: Continent, last BM 6/17  Dressing: Mepilex on coccyx area. PIV SL.    Orders Placed This Encounter      Combination Diet Regular Diet Adult       Plan: MRI completed; Neurosurgery & ID following.

## 2024-06-19 NOTE — PROCEDURES
Neurointerventional Surgery  Post-procedure     Patient Name: Lance Ibrahim  MRN: [id number]  Date of Procedure: June 19, 2024    Procedure: L4-5 disc aspiration, L4 biopsy  Radiologist: Antonio Henderson MD    Contrast: none  Fluoro Time: 2.7 minutes  Air Kerma: 108.65 mGy    Medications: Versed 1 mg IV                       Fentanyl 50 mcg IV  Sedation Time: 10 minutes    EBL: min   Complications: none    Patient reevaluated immediately prior to sedation and prior to procedure.    Preliminary Report:   (See dictation for full detail)  - 1 ml of rust colored cloudy fluid aspirated from L4-5 disc space, sent to lab  - Large core specimen also obtained from L4 interior endplate, sent to lab    Assess/Plan:  Bedrest x 2 hrs  See lab results    Antonio Henderson MD   Emergency pager: [phone]  Office: [phone]

## 2024-06-19 NOTE — CONSULTS
"[facility] ACUTE INPATIENT PAIN SERVICE    [facility], [facility], [facility], [facility], [place]   PAIN CONSULT     Requesting provider: Lin Lara PA-C  Reason for consult: discitis osteomyelitis with ongoing pain     Assessment/Plan:  Lance Ibrahim is a 79 year old male who was admitted on 6/18/2024. He initially presented to [facility] with acute on chronic intractable pain with RLE radiculopathy.  History of HFpEF, CKD, gout, chronic low back pain with spinal stimulator, chronic pain syndrome.       shows routine fills of gabapentin, sporadic opioid prescriptions.   -06/06/2024 gabapentin 300 mg #30 for 10 days  -05/28/2024 oxycodone 5 mg #18 for 5 days  -05/23/2024 oxycodone 5 mg #18 for 5 days  -05/22/2024 oxycodone 5 mg tab #6 for 2 days  -05/22/2024 gabapentin 300 mg #30 for 10 days  -03/28/2024 oxycodone 5 mg #15 for 3 days    He is currently followed by [facility] pain clinic and has a spinal stimulator in place which was implanted on April 3.  So far this has been helpful in managing his chronic neuropathy pain.    Infectious disease consulted, consult with IR for possible aspiration/biopsy for cultures, currently holding off on antibiotics until blood cultures return or fevers develop.     Discussed with neurosurgery and patient at the bedside, current plan is for antibiotic therapy and hold off on surgery for now.  Possibility of spinal cord stimulator removal mentioned if needed to clear the infection.    Lance reports that his pain is primarily located right side of the mid back and reports it feels like a \"constant ice pick\".  He woke up on Saturday around 1 AM with the ice pick pain and it has been steady ever since.  He reports when he is lying down and not moving the pain seems to be manageable and is okay.  If he is sitting or standing up pain significantly increases.  Additionally, the pain in his back seems to increase with movement of the lower extremities.  Denies pain in the legs " themselves.  Denies numbness or tingling.  Pain has consistently been a 6.5/10.    He also endorses chronic left knee pain, had replacement scheduled for May however this was pushed due to UTI.  Now rescheduled for September 19 at [facility].  He has had sporadic opioid use, steroid injections for the knee.  He has difficulty with walking at baseline related to the knee pain.    Discussed pain plan with Lance and his son Omari over the phone.  Discussed adding scheduled Tylenol, topicals, and switching from Flexeril to Robaxin.  In the past he has not found lidocaine helpful however he is willing to try it.Reports that oxycodone seems to be helpful in managing his pain as well.  Reviewed risks and benefits of opioids, optimizing adjuvants and oral medications with IV for breakthrough only.  Patient and son are in agreement with the plan.     Reviewed use of bowel medications, patient reports during the previous hospitalization he was given MiraLAX and he did not know and reported this cause significantly loose stools.  Would not like MiraLAX this admission.  He is aware he has as needed senna ordered, did not want it scheduled at this time.    In the last 24 hours, Lance has received: 4 (5mg) oxycodone, 1 (2.5mg) oxycodone, 3 (0.2mg) iv dilaudid.    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  Per neurosurgery, no urgent surgical intervention at this time.  Antibiotics per infectious disease.  Multimodal Medication Therapy:   Adjuvants:   Flexeril discontinued, Robaxin 250 mg 3 times daily   Added lidocaine 3 times daily  Added diclofenac 4 times daily  Scheduled acetaminophen 650 mg every 6 hours  Acetaminophen 650mg q4h prn   No po or iv NSAIDs due to cardiac history, low hemoglobin, CKD  Opioids: 2.5-5mg oxycodone q4h prn   IV dilaudid 0.2mg decreased to q4h prn  Non-medication interventions- Ice, heat prn   Constipation  Prophylaxis- prn senna   Follow up /Discharge Recommendations - We recommend prescribing the following at the time of discharge:  TBD      Subjective:  Reports baseline pain of 6.5/10, more comfortable when he is laying in bed.  Pain increases with ambulation or sitting.  Denies nausea, vomiting, diarrhea, constipation, chest pain, shortness of breath.  Last bowel movement yesterday.         History   Drug Use No         Tobacco Use      Smoking status: Former        Packs/day: 0.00        Types: Cigarettes        Quit date: 3/17/1993        Years since quittin.2      Smokeless tobacco: Never      Objective:  Vital signs in last 24 hours:  B/P: 140/78, T: 98.7, P: 94, R: 16   Blood pressure (!) 140/78, pulse 94, temperature 98.7  F (37.1  C), temperature source Oral, resp. rate 16, SpO2 94%.    Review of Systems:   As per subjective, all others negative.    Physical Exam  General: in no apparent distress, laying in bed and appears comfortable  HEENT: Head normocephalic atraumatic, oral mucosa moist. Sclerae anicteric  Resp: Respirations unlabored  Skin: Warm and dry  Extremities: Able to move all 4 extremities spontaneously, limited movement in the left lower extremity due to knee pain  Psych: Normal affect, pleasant  Neuro: Alert and oriented x 3     Imaging:  Personally Reviewed.    Results for orders placed or performed during the hospital encounter of 24   MR Lumbar Spine w/o & w Contrast    Impression    IMPRESSION:  1.  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  2.  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  3.  At L4-5, severe spinal stenosis secondary to the epidural abscess.  4.  At L5-S1, moderate spinal stenosis secondary to the epidural abscess.        Lab Results:  Personally Reviewed.   Last Comprehensive Metabolic Panel:  Sodium   Date Value Ref Range Status   2024 135 135 - 145 mmol/L Final     Comment:      Reference intervals for this test were updated on 09/26/2023 to more accurately reflect our healthy population. There may be differences in the flagging of prior results with similar values performed with this method. Interpretation of those prior results can be made in the context of the updated reference intervals.    04/29/2021 139 133 - 144 mmol/L Final     Potassium   Date Value Ref Range Status   06/19/2024 4.4 3.4 - 5.3 mmol/L Final   08/27/2022 4.6 3.5 - 5.0 mmol/L Final   04/29/2021 4.7 3.4 - 5.3 mmol/L Final     Chloride   Date Value Ref Range Status   06/19/2024 101 98 - 107 mmol/L Final   08/27/2022 108 (H) 98 - 107 mmol/L Final   04/29/2021 107 94 - 109 mmol/L Final     Carbon Dioxide   Date Value Ref Range Status   04/29/2021 27 20 - 32 mmol/L Final     Carbon Dioxide (CO2)   Date Value Ref Range Status   06/19/2024 21 (L) 22 - 29 mmol/L Final   08/27/2022 22 22 - 31 mmol/L Final     Anion Gap   Date Value Ref Range Status   06/19/2024 13 7 - 15 mmol/L Final   08/27/2022 8 5 - 18 mmol/L Final   04/29/2021 5 3 - 14 mmol/L Final     Glucose   Date Value Ref Range Status   06/19/2024 159 (H) 70 - 99 mg/dL Final   08/27/2022 131 (H) 70 - 125 mg/dL Final   04/29/2021 212 (H) 70 - 99 mg/dL Final     GLUCOSE BY METER POCT   Date Value Ref Range Status   06/19/2024 153 (H) 70 - 99 mg/dL Final     Urea Nitrogen   Date Value Ref Range Status   06/19/2024 23.3 (H) 8.0 - 23.0 mg/dL Final   08/27/2022 16 8 - 28 mg/dL Final   04/29/2021 27 7 - 30 mg/dL Final     Creatinine   Date Value Ref Range Status   06/19/2024 1.16 0.67 - 1.17 mg/dL Final   04/29/2021 1.19 0.66 - 1.25 mg/dL Final     GFR Estimate   Date Value Ref Range Status   06/19/2024 64 >60 mL/min/1.73m2 Final     Comment:     eGFR calculated using 2021 CKD-EPI equation.   04/29/2021 59 (L) >60 mL/min/[1.73_m2] Final     Comment:     Non  GFR Calc  Starting 12/18/2018, serum creatinine based estimated GFR (eGFR) will be   calculated using  "the Chronic Kidney Disease Epidemiology Collaboration   (CKD-EPI) equation.       GFR, ESTIMATED POCT   Date Value Ref Range Status   12/03/2021 15 (L) >60 mL/min/1.73m2 Final     Calcium   Date Value Ref Range Status   06/19/2024 9.1 8.8 - 10.2 mg/dL Final   04/29/2021 9.3 8.5 - 10.1 mg/dL Final        UA: No results found for: \"UAMP\", \"UBARB\", \"BENZODIAZEUR\", \"UCANN\", \"UCOC\", \"OPIT\", \"UPCP\"       Please see A&P for additional details of medical decision making.  MANAGEMENT DISCUSSED with the following over the past 24 hours:   -Neurosurgery  NOTE(S)/MEDICAL RECORDS REVIEWED over the past 24 hours:   -Hospitlist: PMH, HPI  -Neurosurgery   -infectious disease   Tests personally interpreted in the past 24 hours:  - MRI lumbar spine showing 4-5  Tests REVIEWED in the past 24 hours:  - CMP  - CBC  SUPPLEMENTAL HISTORY, in addition to the patient's history, over the past 24 hours obtained from:   - Son  Medical complexity over the past 24 hours:  - Parenteral (IV) CONTROLLED SUBSTANCES ordered  - Prescription DRUG MANAGEMENT performed      [facility]  Acute Care Pain Management Program   Hours of pain coverage Mon-Fri [phone], afterhours please call the house officer    [employer] [facility] (AZAEL, Lucy, [place], RH)   Page via [employer]- Click HERE to page Nelsy or Fiordaliza text web console -Click for Fiordaliza            "

## 2024-06-19 NOTE — PHARMACY-ADMISSION MEDICATION HISTORY
Pharmacist Admission Medication History    Admission medication history is complete. The information provided in this note is only as accurate as the sources available at the time of the update.    Information Source(s): Facility ([facility]/) medication list/MAR via N/A    Pertinent Information: None    Changes made to PTA medication list:  Added: scheduled apap, glipizide, culturelle  Deleted: other probiotic  Changed: None    Allergies reviewed with patient and updates made in EHR: no    Medication History Completed By: Georgina Frazier RPH 6/19/2024 6:26 PM    PTA Med List   Medication Sig Note Last Dose    acetaminophen (TYLENOL) 325 MG tablet Take 975 mg by mouth 3 times daily 0800, 1400, 2000  6/17/2024 at am    acetaminophen (TYLENOL) 325 MG tablet Take 3 tablets (975 mg) by mouth 3 times daily as needed for mild pain (Patient taking differently: Take 975 mg by mouth daily as needed for mild pain)   at prn    aspirin 81 MG EC tablet Take 1 tablet (81 mg) by mouth daily  6/17/2024 at am    calcium carbonate (TUMS) 500 MG chewable tablet Take 1,000 mg by mouth 3 times daily as needed for heartburn   at prn    colchicine (COLCYRS) 0.6 MG tablet TAKE 1 TABLET DAILY  6/17/2024 at am    diclofenac (VOLTAREN) 1 % topical gel Apply 4 g topically 4 times daily as needed for moderate pain Left knee   at prn    emollient (VANICREAM) external cream Apply topically 3 times daily      famotidine (PEPCID) 20 MG tablet Take 1 tablet (20 mg) by mouth 2 times daily as needed (itching)   at prn    Ferrous Gluconate 240 (27 Fe) MG TABS Take 1 tablet by mouth daily   6/17/2024 at am    fluticasone (FLONASE) 50 MCG/ACT nasal spray Spray 1-2 sprays in nostril daily as needed   at prn    gabapentin (NEURONTIN) 300 MG capsule Take 1 capsule (300 mg) by mouth 3 times daily as needed   at prn    glipiZIDE (GLUCOTROL XL) 2.5 MG 24 hr tablet Take 2.5 mg by mouth daily  6/17/2024 at am    ibuprofen (ADVIL/MOTRIN) 400 MG tablet Take 1 tablet  (400 mg) by mouth every 6 hours as needed for moderate pain   at prn    lactobacillus rhamnosus, GG, (CULTURELL) capsule Take 2 capsules by mouth daily Noon  6/17/2024    Lidocaine (LIDOCARE) 4 % Patch Place 1 patch onto the skin daily as needed To prevent lidocaine toxicity, patient should be patch free for 12 hrs daily.   at prn    lisinopril (ZESTRIL) 10 MG tablet Take 1 tablet (10 mg) by mouth every 24 hours  6/17/2024 at am    loratadine (CLARITIN) 10 MG tablet Take 10 mg by mouth daily as needed for allergies   at prn    melatonin 5 MG CAPS Take 5 mg by mouth at bedtime  6/16/2024 at pm    nortriptyline (PAMELOR) 10 MG capsule Take 20 mg by mouth nightly as needed   at prn    oxyCODONE (ROXICODONE) 5 MG tablet Take 1 tablet (5 mg) by mouth every 6 hours as needed for severe pain   at prn    polyethylene glycol (MIRALAX) 17 GM/Dose powder Take 17 g by mouth daily as needed for constipation   at prn    Semaglutide (RYBELSUS) 14 MG tablet Take 14 mg by mouth daily  6/17/2024 at am    sodium bicarbonate 650 MG tablet Take 3 tablets (1,950 mg) by mouth 3 times daily 6/19/2024: 0800, 1200, 2000 6/17/2024 at 1200    torsemide (DEMADEX) 20 MG tablet TAKE 1 TABLET DAILY  6/17/2024 at am    ursodiol (ACTIGALL) 300 MG capsule Take 1 capsule (300 mg) by mouth 2 times daily  6/17/2024 at am

## 2024-06-20 LAB
ANION GAP SERPL CALCULATED.3IONS-SCNC: 14 MMOL/L (ref 7–15)
BUN SERPL-MCNC: 21.5 MG/DL (ref 8–23)
CALCIUM SERPL-MCNC: 9.2 MG/DL (ref 8.8–10.2)
CHLORIDE SERPL-SCNC: 99 MMOL/L (ref 98–107)
CREAT SERPL-MCNC: 1.17 MG/DL (ref 0.67–1.17)
DEPRECATED HCO3 PLAS-SCNC: 23 MMOL/L (ref 22–29)
EGFRCR SERPLBLD CKD-EPI 2021: 63 ML/MIN/1.73M2
GLUCOSE BLDC GLUCOMTR-MCNC: 144 MG/DL (ref 70–99)
GLUCOSE BLDC GLUCOMTR-MCNC: 145 MG/DL (ref 70–99)
GLUCOSE BLDC GLUCOMTR-MCNC: 145 MG/DL (ref 70–99)
GLUCOSE BLDC GLUCOMTR-MCNC: 180 MG/DL (ref 70–99)
GLUCOSE BLDC GLUCOMTR-MCNC: 235 MG/DL (ref 70–99)
GLUCOSE SERPL-MCNC: 172 MG/DL (ref 70–99)
HGB BLD-MCNC: 10.9 G/DL (ref 13.3–17.7)
POTASSIUM SERPL-SCNC: 4.5 MMOL/L (ref 3.4–5.3)
SODIUM SERPL-SCNC: 136 MMOL/L (ref 135–145)

## 2024-06-20 PROCEDURE — 250N000011 HC RX IP 250 OP 636: Performed by: HOSPITALIST

## 2024-06-20 PROCEDURE — 250N000013 HC RX MED GY IP 250 OP 250 PS 637

## 2024-06-20 PROCEDURE — 250N000011 HC RX IP 250 OP 636: Mod: JZ

## 2024-06-20 PROCEDURE — 258N000003 HC RX IP 258 OP 636: Mod: JZ | Performed by: HOSPITALIST

## 2024-06-20 PROCEDURE — 36415 COLL VENOUS BLD VENIPUNCTURE: CPT | Performed by: HOSPITALIST

## 2024-06-20 PROCEDURE — 80048 BASIC METABOLIC PNL TOTAL CA: CPT | Performed by: HOSPITALIST

## 2024-06-20 PROCEDURE — 85018 HEMOGLOBIN: CPT | Performed by: HOSPITALIST

## 2024-06-20 PROCEDURE — 99231 SBSQ HOSP IP/OBS SF/LOW 25: CPT | Performed by: PHYSICIAN ASSISTANT

## 2024-06-20 PROCEDURE — 99232 SBSQ HOSP IP/OBS MODERATE 35: CPT

## 2024-06-20 PROCEDURE — 99233 SBSQ HOSP IP/OBS HIGH 50: CPT | Performed by: HOSPITALIST

## 2024-06-20 PROCEDURE — 120N000001 HC R&B MED SURG/OB

## 2024-06-20 PROCEDURE — 250N000013 HC RX MED GY IP 250 OP 250 PS 637: Performed by: HOSPITALIST

## 2024-06-20 RX ORDER — HYDROMORPHONE HCL IN WATER/PF 6 MG/30 ML
0.2 PATIENT CONTROLLED ANALGESIA SYRINGE INTRAVENOUS EVERY 6 HOURS PRN
Status: DISCONTINUED | OUTPATIENT
Start: 2024-06-20 | End: 2024-06-21

## 2024-06-20 RX ORDER — COLCHICINE 0.6 MG/1
0.6 TABLET ORAL DAILY
Status: DISCONTINUED | OUTPATIENT
Start: 2024-06-20 | End: 2024-06-25 | Stop reason: HOSPADM

## 2024-06-20 RX ORDER — LIDOCAINE 50 MG/G
OINTMENT TOPICAL 3 TIMES DAILY PRN
Status: DISCONTINUED | OUTPATIENT
Start: 2024-06-20 | End: 2024-06-25 | Stop reason: HOSPADM

## 2024-06-20 RX ORDER — HYDROMORPHONE HCL IN WATER/PF 6 MG/30 ML
0.2 PATIENT CONTROLLED ANALGESIA SYRINGE INTRAVENOUS EVERY 6 HOURS PRN
Status: DISCONTINUED | OUTPATIENT
Start: 2024-06-20 | End: 2024-06-20

## 2024-06-20 RX ORDER — FAMOTIDINE 20 MG/1
20 TABLET, FILM COATED ORAL 2 TIMES DAILY PRN
Status: DISCONTINUED | OUTPATIENT
Start: 2024-06-20 | End: 2024-06-25 | Stop reason: HOSPADM

## 2024-06-20 RX ORDER — LISINOPRIL 10 MG/1
10 TABLET ORAL DAILY
Status: DISCONTINUED | OUTPATIENT
Start: 2024-06-20 | End: 2024-06-25 | Stop reason: HOSPADM

## 2024-06-20 RX ORDER — GABAPENTIN 100 MG/1
100 CAPSULE ORAL 3 TIMES DAILY
Status: DISCONTINUED | OUTPATIENT
Start: 2024-06-20 | End: 2024-06-25 | Stop reason: HOSPADM

## 2024-06-20 RX ORDER — LACTOBACILLUS RHAMNOSUS GG 10B CELL
2 CAPSULE ORAL DAILY
Status: DISCONTINUED | OUTPATIENT
Start: 2024-06-20 | End: 2024-06-25 | Stop reason: HOSPADM

## 2024-06-20 RX ORDER — FERROUS GLUCONATE 324(38)MG
324 TABLET ORAL DAILY
Status: DISCONTINUED | OUTPATIENT
Start: 2024-06-20 | End: 2024-06-25 | Stop reason: HOSPADM

## 2024-06-20 RX ORDER — GLIPIZIDE 2.5 MG/1
2.5 TABLET, EXTENDED RELEASE ORAL
Status: DISCONTINUED | OUTPATIENT
Start: 2024-06-21 | End: 2024-06-25 | Stop reason: HOSPADM

## 2024-06-20 RX ORDER — FLUTICASONE PROPIONATE 50 MCG
1-2 SPRAY, SUSPENSION (ML) NASAL DAILY PRN
Status: DISCONTINUED | OUTPATIENT
Start: 2024-06-20 | End: 2024-06-25 | Stop reason: HOSPADM

## 2024-06-20 RX ORDER — SODIUM BICARBONATE 650 MG/1
1950 TABLET ORAL 3 TIMES DAILY
Status: DISCONTINUED | OUTPATIENT
Start: 2024-06-20 | End: 2024-06-25 | Stop reason: HOSPADM

## 2024-06-20 RX ORDER — NORTRIPTYLINE HCL 10 MG
20 CAPSULE ORAL
Status: DISCONTINUED | OUTPATIENT
Start: 2024-06-20 | End: 2024-06-25 | Stop reason: HOSPADM

## 2024-06-20 RX ORDER — LORATADINE 10 MG/1
10 TABLET ORAL DAILY PRN
Status: DISCONTINUED | OUTPATIENT
Start: 2024-06-20 | End: 2024-06-25 | Stop reason: HOSPADM

## 2024-06-20 RX ORDER — URSODIOL 300 MG/1
300 CAPSULE ORAL 2 TIMES DAILY
Status: DISCONTINUED | OUTPATIENT
Start: 2024-06-20 | End: 2024-06-25 | Stop reason: HOSPADM

## 2024-06-20 RX ADMIN — METHOCARBAMOL 250 MG: 500 TABLET ORAL at 09:07

## 2024-06-20 RX ADMIN — FERROUS GLUCONATE 324 MG: 324 TABLET ORAL at 15:53

## 2024-06-20 RX ADMIN — HYDROMORPHONE HYDROCHLORIDE 0.2 MG: 0.2 INJECTION, SOLUTION INTRAMUSCULAR; INTRAVENOUS; SUBCUTANEOUS at 12:54

## 2024-06-20 RX ADMIN — METHOCARBAMOL 250 MG: 500 TABLET ORAL at 15:53

## 2024-06-20 RX ADMIN — SODIUM BICARBONATE 650 MG TABLET 1950 MG: at 20:56

## 2024-06-20 RX ADMIN — VANCOMYCIN HYDROCHLORIDE 1500 MG: 10 INJECTION, POWDER, LYOPHILIZED, FOR SOLUTION INTRAVENOUS at 17:53

## 2024-06-20 RX ADMIN — METHOCARBAMOL 250 MG: 500 TABLET ORAL at 21:01

## 2024-06-20 RX ADMIN — LISINOPRIL 10 MG: 10 TABLET ORAL at 15:53

## 2024-06-20 RX ADMIN — Medication 2 CAPSULE: at 15:53

## 2024-06-20 RX ADMIN — DICLOFENAC 2 G: 10 GEL TOPICAL at 21:02

## 2024-06-20 RX ADMIN — DICLOFENAC 2 G: 10 GEL TOPICAL at 09:07

## 2024-06-20 RX ADMIN — ACETAMINOPHEN 650 MG: 325 TABLET, FILM COATED ORAL at 12:54

## 2024-06-20 RX ADMIN — INSULIN ASPART 1 UNITS: 100 INJECTION, SOLUTION INTRAVENOUS; SUBCUTANEOUS at 13:04

## 2024-06-20 RX ADMIN — DICLOFENAC 2 G: 10 GEL TOPICAL at 13:05

## 2024-06-20 RX ADMIN — INSULIN ASPART 1 UNITS: 100 INJECTION, SOLUTION INTRAVENOUS; SUBCUTANEOUS at 18:01

## 2024-06-20 RX ADMIN — ACETAMINOPHEN 650 MG: 325 TABLET, FILM COATED ORAL at 17:52

## 2024-06-20 RX ADMIN — ACETAMINOPHEN 650 MG: 325 TABLET, FILM COATED ORAL at 06:25

## 2024-06-20 RX ADMIN — INSULIN ASPART 1 UNITS: 100 INJECTION, SOLUTION INTRAVENOUS; SUBCUTANEOUS at 09:07

## 2024-06-20 RX ADMIN — URSODIOL 300 MG: 300 CAPSULE ORAL at 20:56

## 2024-06-20 RX ADMIN — GABAPENTIN 100 MG: 100 CAPSULE ORAL at 21:01

## 2024-06-20 RX ADMIN — COLCHICINE 0.6 MG: 0.6 TABLET ORAL at 17:53

## 2024-06-20 RX ADMIN — ACETAMINOPHEN 650 MG: 325 TABLET, FILM COATED ORAL at 01:00

## 2024-06-20 ASSESSMENT — ACTIVITIES OF DAILY LIVING (ADL)
ADLS_ACUITY_SCORE: 37
ADLS_ACUITY_SCORE: 38
ADLS_ACUITY_SCORE: 37
ADLS_ACUITY_SCORE: 38
ADLS_ACUITY_SCORE: 37
ADLS_ACUITY_SCORE: 38
ADLS_ACUITY_SCORE: 37
ADLS_ACUITY_SCORE: 37
ADLS_ACUITY_SCORE: 38
ADLS_ACUITY_SCORE: 37

## 2024-06-20 NOTE — PLAN OF CARE
Goal Outcome Evaluation:    Shift Summary: 6/19-6/20/24, [id number]     Admitting Diagnosis: Back pain   Vitals: Stable on RA  Pain 2-3/10. Taking scheduled tylenol  A&Ox4  Voiding: Continent; Uses bedside urinal  Mobility: Ax1 GB & walker  Tele: N/A  CMS: Baseline neuropathy on BLE  Lung Sounds clear   GI: Continent, last BM 6/17  Dressing: Incision on the back from biopsy is CDI   PIV SL w/ intermittent IV abx  BS checks    Orders Placed This Encounter      Combination Diet Regular Diet Adult        Plan: ID following

## 2024-06-20 NOTE — PROGRESS NOTES
[facility]    Infectious Disease Progress Note    Date of Service (when I saw the patient): 06/20/2024     Assessment & Plan   Lance Ibrahim is a 79 year old male who was admitted on 6/18/2024.     Impression:  80 yo with PMH of  heart failure with preserved ejection fraction, hypertension, dyslipidemia, CKD, gout, BPH, type 2 diabetes and chronic low back pain for which he is folllowed by [employer] and has an implantable spine stimulator ([employer], MRI conditional 4/2024) in place.   Presented to [facility] with acute on chronic, intractable pain with RLE radiculopathy.   CT was concerning for discitis-osteomyelitis at L4-5 with epidural/intraforaminal phlegmon vs abscess at L4-S1 with severe foraminal narrowing. Transferred to [facility] for Neurosurgery evaluation and ID consultation.   Of note, recent history of urosepsis with klebsiella in the blood and urine treated with antibiotics   MRI with discitis osteomyelitis at L4-5, epidural abscess in the ventral epidural space, craniocaudal extension of 5 cm (from inferior L4 through inferior S1 level) and severe spinal stenosis at L4-5 from abscess.  S/p L4-5 disc aspiration and L4 biopsy 6/19 with cultures no growth to date, gram stain with 3+ WBC.  Blood cultures no growth to date.   Allergies to PCN (anaphylaxis), Cephalexin (rash - required hospitalization), Cefazolin (rash), and Sulfa (swelling of face and hands, itching)     Recommendations:  Continue IV Vancomycin for now.  Following aspiration cultures.   Further recommendations to follow final culture results.       Patient and plan discussed with Dr. Conklin.     Tsering Sorenson PA-C        Interval History   Afebrile.   Labs reviewed   No changes to past medical, social or family history   Back pain under control when not moving, but awful with any movement. Awaiting aspiration results.       Physical Exam   Temp: 98.2  F (36.8  C) Temp src: Oral BP: 136/82 Pulse: 93   Resp: 16  SpO2: 97 % O2 Device: None (Room air)    There were no vitals filed for this visit.  Vital Signs with Ranges  Temp:  [98.2  F (36.8  C)-99.5  F (37.5  C)] 98.2  F (36.8  C)  Pulse:  [] 93  Resp:  [16-25] 16  BP: (112-177)/() 136/82  SpO2:  [96 %-100 %] 97 %    Constitutional: Awake, alert, cooperative, no apparent distress  Lungs: Clear to auscultation bilaterally, no crackles or wheezing  Cardiovascular: Regular rate and rhythm, normal S1 and S2, and no murmur noted  Abdomen: Normal bowel sounds, soft, non-distended, non-tender  Skin: No rashes, no cyanosis, no edema  Other:    Medications   Current Facility-Administered Medications   Medication Dose Route Frequency Provider Last Rate Last Admin     Current Facility-Administered Medications   Medication Dose Route Frequency Provider Last Rate Last Admin    acetaminophen (TYLENOL) tablet 650 mg  650 mg Oral Q6H Nelsy Tee APRN CNP   650 mg at 06/20/24 0625    diclofenac (VOLTAREN) 1 % topical gel 2 g  2 g Topical 4x Daily Nelsy Tee APRN CNP   2 g at 06/20/24 0907    insulin aspart (NovoLOG) injection (RAPID ACTING)  1-3 Units Subcutaneous TID AC Socorro Aceves MD   1 Units at 06/20/24 0907    insulin aspart (NovoLOG) injection (RAPID ACTING)  1-3 Units Subcutaneous At Bedtime Socorro Aecves MD   1 Units at 06/18/24 2157    insulin glargine (LANTUS PEN) injection 5 Units  5 Units Subcutaneous BID Tasha Vivar MD   5 Units at 06/20/24 0915    methocarbamol (ROBAXIN) half-tab 250 mg  250 mg Oral TID Nelsy Tee APRN CNP   250 mg at 06/20/24 0907    sodium chloride (PF) 0.9% PF flush 3 mL  3 mL Intracatheter Q8H Socorro Aceves MD   3 mL at 06/20/24 0626    vancomycin (VANCOCIN) 1,500 mg in 0.9% NaCl 250 mL intermittent infusion  1,500 mg Intravenous Q24H Tasha Vivar  mL/hr at 06/19/24 1834 1,500 mg at 06/19/24 1834       Data   All microbiology laboratory data reviewed.  Recent Labs   Lab Test  06/20/24  0829 06/19/24  0710 06/18/24  0845 06/17/24  1758   WBC  --  7.8 8.2 10.2   HGB 10.9* 9.7* 9.7* 10.2*   HCT  --  30.8* 31.4* 32.6*   MCV  --  93 94 90   PLT  --  289 284 348     Recent Labs   Lab Test 06/20/24  0829 06/19/24  0710 06/18/24  0845   CR 1.17 1.16 1.25*     Recent Labs   Lab Test 06/18/24  1029   SED 69*     CRP Inflammation   Date Value Ref Range Status   06/18/2024 10.61 (H) <5.00 mg/L Final        06/19/2024 1548 06/20/2024 1323 Biopsy Aerobic Bacterial Culture Routine With Gram Stain [[id number]]   Biopsy from Spine, Lumbar    Preliminary result Component Value   Culture No growth, less than 1 day P   Gram Stain Result No organisms seen P    3+ WBC seen P    Predominantly PMNs             06/19/2024 1548 06/19/2024 1602 Anaerobic Bacterial Culture Routine [[id number]]   Aspirate from Spine, Lumbar    In process Component Value   No component results             06/19/2024 1548 06/20/2024 1325 Aspirate Aerobic Bacterial Culture Routine With Gram Stain [[id number]]   Aspirate from Spine, Lumbar    Preliminary result Component Value   Culture No growth, less than 1 day P   Gram Stain Result No organisms seen P    3+ WBC seen P    Predominantly PMNs             06/19/2024 1541 06/19/2024 1908 Anaerobic Bacterial Culture Routine [[id number]]   Biopsy from Spine, Lumbar    In process Component Value   No component results             06/18/2024 1038 06/19/2024 1331 Blood Culture Arm, Right [[id number]]   Blood from Arm, Right    Preliminary result Component Value   Culture No growth after 1 day P             06/18/2024 1029 06/19/2024 1331 Blood Culture Arm, Left [[id number]]   Blood from Arm, Left    Preliminary result Component Value   Culture No growth after 1 day P             06/17/2024 1612 06/19/2024 0547 Urine Culture Aerobic Bacterial [[id number]]   Urine, Clean Catch    Final result Component Value   Culture <10,000 CFU/mL Mixture of Urogenital Joyce            EXAM: MR  LUMBAR SPINE W/O and W CONTRAST  LOCATION: [facility]  DATE/TIME: 6/18/2024 7:52 PM CDT    INDICATION: Urosepsis, abnormal CT.  COMPARISON: CT 6/17/2024.  TECHNIQUE: Routine lumbar spine MRI without and with IV contrast.    FINDINGS:  Nomenclature: Nomenclature based on 5 lumbar type vertebral bodies.  Infection changes: Significant abnormal bone marrow edema L4 and L5 vertebral bodies, evidence of fluid in the L4-L5 disc space with significant enhancement of the vertebral bodies. Epidural abscess in the left paracentral epidural space measures 6 x 6  mm in the axial plane and 5 cm craniocaudally posterior to L4-S1 vertebrae.  Alignment: Minor retrolisthesis L3-L4, L4-L5 and L5-S1.  Vertebrae: Normal vertebral body heights.  Marrow signal: Marked bone marrow edema L4 and L5 vertebral bodies.  Spinal cord: Normal distal spinal cord and cauda equina with conus medullaris at L1.  Extraspinal: No extra spinal abnormalities.  Pelvis: Unremarkable visualized bony pelvis.    T12-L1: Normal height of disc. Mild disc desiccation without herniation. Normal facet joints. No recess stenosis. No central spinal stenosis, no right foramen stenosis, no left foramen stenosis.    L1-L2: Normal height of disc. Mild disc desiccation without herniation. Normal facet joints. No recess stenosis. No central spinal stenosis, no right foramen stenosis, no left foramen stenosis.    L2-L3: Normal height of disc. Mild disc desiccation without herniation. Mild bilateral facet hypertrophy. No recess stenosis. No central spinal stenosis, no right foramen stenosis, no left foramen stenosis.    L3-L4: Normal height of disc. Desiccated disc with shallow disc bulge. Moderate bilateral facet hypertrophy. No recess stenosis. No central spinal stenosis, no right foramen stenosis, no left foramen stenosis.    L4-L5: Marked loss of height. Fluid signal with prominent disc bulge. Moderate bilateral facet hypertrophy. Epidural  abscess. Severe recess stenosis. Severe central spinal stenosis, no right foramen stenosis, no left foramen stenosis.    L5-S1: Moderate loss of disc height. Degenerated disc with moderate left subarticular protrusion. Moderate bilateral facet hypertrophy. Moderate bilateral recess stenosis. Moderate central spinal stenosis, severe right foramen stenosis, mild left foramen   stenosis.   Impression:     IMPRESSION:  1.  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  2.  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  3.  At L4-5, severe spinal stenosis secondary to the epidural abscess.  4.  At L5-S1, moderate spinal stenosis secondary to the epidural abscess.

## 2024-06-20 NOTE — PROGRESS NOTES
[facility]  Hospitalist Progress Note   06/20/2024          Assessment and Plan:       Lance Ibrahim is a 79 year old male with history of heart failure with preserved ejection fraction, hypertension, dyslipidemia, CKD, gout, BPH, type 2 diabetes and chronic low back pain for which he is folllowed by [facility] and has an implantable spine stimulator ([employer], MRI conditional 4/2024) in place. He initially presented to [facility] with acute on chronic, intractable pain with RLE radiculopathy. CT was concerning for discitis-osteomyelitis at L4-5 with epidural/intraforaminal phlegmon vs abscess at L4-S1 with severe foraminal narrowing. Transferred to [facility] for Neurosurgery evaluation and ID consultation.     Status post IR guided L4-L5 disc aspiration and L4 biopsy.  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  Epidural abscess.  Severe spinal stenosis.  Acute on chronic back pain with RLE radiculopathy  --Presented with acute on chronic back pain located on the right side of his back.  Significant tenderness to palpation at approximately the L3-L4 level on the right side with associated RLE radiculopathy. No bladder or bowel incontinence, no saddle anesthesia. Uses a walker for shorter distances and wheelchair for longer distances at baseline. Significant pain with weight bearing. No recent trauma or falls, but did get a trigger point injection by [facility] Pain clinic on 6/14. Also note recent hospitalization for severe sepsis secondary to Klebsiella UTI for which patient completed course of antibiotics.   *Afebrile since admission, hemodynamically stable.  WBC WNL. CRP 12.4.   *CT lumbar spine as above (review formal report for complete details)   *MRI lumbar spine Discitis osteomyelitis at L4-5. 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 At L4-5, severe spinal stenosis secondary to the epidural abscess. At  L5-S1, moderate spinal stenosis secondary to the epidural abscess.    --Status post IR guided L4-L5 disc aspiration and L4 biopsy. 1 ml of rust colored cloudy fluid aspirated from L4-5 disc space, sent to lab. Large core specimen also obtained from L4 interior endplate, sent to lab.  --Started on IV vancomycin on 6/19 post IR guided aspiration.  Continue intravenous vancomycin.  Follow aspiration cultures.  Follow final blood cultures.  --Noted allergies to penicillin, cephalosporins and sulfa.  --Infectious disease following.  --Neurosurgery following, no surgical intervention.  Holding PTA aspirin, will resume when okay by neurosurgical team.  Appreciate multidisciplinary team input.  Trend WBC count, fever curve.  - Analgesic regimen with PRN tylenol, PRN oxycodone 2.5-5 mg q4 hrs PRN, IV dilaudid 0.2-0.4 mg q2 hrs PRN, flexeril 10 mg TID. Note pt utilizes NSAIDs solely at home for pain control.  Pain management following.  PT, OT evaluation.    Recent severe sepsis in the setting of Klebsiella UTI:   Hospitalization on 5/16/2024-5/22/2024 due to severe sepsis related to UTI caused by Klebsiella oxytoca. Patient was on IV Vanco, Levaquin, Cefepime, and Ceftriaxone in the hospital. He was discharged on oral Cefdinir and Levaquin. No current sx of UTI.   -UA with trace blood in urine, otherwise unremarkable.   Urine cultures with less than 10,000 mixed fred.  CT A/P on admission negative for renal calculi or hydronephrosis, note left renal cyst.      Uncontrolled type 2 diabetes mellitus with a hemoglobin A1c of 8.0.  Peripheral neuropathy.  PTA on glipizide, semaglutide, as needed gabapentin.  Resume PTA glipizide.  Will start on scheduled gabapentin 100 mg 3 times daily.  Started on insulin Lantus on 6/19, increase to Lantus 60 units twice daily on 6/20.  Continue sliding scale insulin.  Monitor blood sugars, optimize regimen.  Hypoglycemia protocol in place.    CAD, subtotal occlusion in small distal  LAD.  HFpEF, not in acute exacerbation   Mild aortic stenosis   HTN, HLD.   Denies any chest pain or palpitations or shortness of breath.    Euvolemic.  Blood pressure within normal limits.  *Echo 5/16/24 with preserved EF, borderline global hypokinesia, wall motion consistent with conduction abnormality.   Hold PTA torsemide.  Continue PTA lisinopril 10 mg oral daily.  Holding PTA aspirin, resume 6/21 if no plans for procedure.     CKD IIIa:   Baseline creatinine 1.3-1.6 more recently. Stable on admission.   Continue PTA sodium bicarbonate.     Normocytic anemia: Baseline Hgb 8-9 range. 10.2 on admission.   - Monitor   Continue PTA iron supplements.     Gout: Not in a flare.  Continue PTA colchicine.     Biliary obstruction with stricture s/p EUS/ERCP 4/30/24 with sphincterotomy and temporary stent placement   *Admission CT notes common bile duct stent with slight dilatation of the intrahepatic bile ducts. A tiny portion of the distal stent appears fractured, of doubtful clinical significance. Pt without abdominal pain.   - PTA Ursodiol 300 mg BID    Hx of TIA, 1993, no residual deficits  PTA aspirin was on hold.  Await neurosurgical input and will resume on 6/21 if no plans for procedure.     PAD s/p toe amputation   Age-appropriate health maintenance to outpatient.     Baseline R>L DELFINO, mild     Physical deconditioning from medical illness, senile frailty.  [facility] [place]  Ambulation:  walker for short distances, wheelchair for longer distances  Rehab evaluation once stable.    Obesity with a BMI of 30.11.  Increase all-cause mortality and morbidity.  Consider lifestyle modification with diet and exercise as able to.    Sleep apnea:   Does not use CPAP  Recommend sleep studies as outpatient.    Orders Placed This Encounter      Regular Diet Adult      DVT Prophylaxis: sCDs, ambulate.  Pharmacological DVT prophylaxis until neurosurgery, IR input.  Code Status: Full Code  Disposition: Expected discharge  in greater than 2 days.    Medically Ready for Discharge: Anticipated in 2-4 Days     Discussed with patient, bedside RN  >51 minutes spent by me on the date of service doing chart review, history, exam, documentation & further activities per the note.      Tasha Vivar MD        Interval History:        Patient lying in bed.  Denies any chest pain or palpitation.  Denies any headache or dizziness.  Denies any nausea or vomiting.  Denies any new tingling or numbness.  Denies any new urinary or bowel incontinence.  Per report up with assistance of 1 and gait belt and walker.  Complaining of ongoing pain in his back.  Has been receiving scheduled Flexeril, as needed oxycodone, as needed IV Dilaudid.  MRI results reviewed with patient.  Awaiting neurosurgery,  IR input.       Physical Exam:        Physical Exam   Temp:  [98.2  F (36.8  C)-99.5  F (37.5  C)] 98.2  F (36.8  C)  Pulse:  [] 93  Resp:  [16-25] 16  BP: (112-177)/() 136/82  SpO2:  [96 %-100 %] 97 %    Intake/Output Summary (Last 24 hours) at 6/19/2024 1456  Last data filed at 6/19/2024 0900  Gross per 24 hour   Intake 1000 ml   Output 1250 ml   Net -250 ml     PHYSICAL EXAM  GENERAL: Patient is in no distress. Alert and oriented.  HEART: Regular rate and rhythm. S1S2.   LUNGS: Clear to auscultation bilaterally. No expiratory wheeze.  Respirations unlabored  ABDOMEN: Soft, no abdominal tenderness, bowel sounds heard   NEURO: Moving all extremities.  EXTREMITIES: trace pedal edema.  SKIN: Warm, dry. No rash  PSYCHIATRY Cooperative       Medications:        Current Facility-Administered Medications   Medication Dose Route Frequency Provider Last Rate Last Admin    acetaminophen (TYLENOL) tablet 650 mg  650 mg Oral Q6H Nelsy Tee APRN CNP   650 mg at 06/20/24 0625    diclofenac (VOLTAREN) 1 % topical gel 2 g  2 g Topical 4x Daily Nelsy Tee APRN CNP   2 g at 06/20/24 0907    insulin aspart (NovoLOG) injection (RAPID ACTING)  1-3 Units  Subcutaneous TID AC Socorro Aceves MD   1 Units at 06/20/24 0907    insulin aspart (NovoLOG) injection (RAPID ACTING)  1-3 Units Subcutaneous At Bedtime Socorro Aceves MD   1 Units at 06/18/24 2157    insulin glargine (LANTUS PEN) injection 5 Units  5 Units Subcutaneous BID Tasha Vivar MD   5 Units at 06/20/24 0915    methocarbamol (ROBAXIN) half-tab 250 mg  250 mg Oral TID Nelsy Tee APRN CNP   250 mg at 06/20/24 0907    sodium chloride (PF) 0.9% PF flush 3 mL  3 mL Intracatheter Q8H Socorro Aceves MD   3 mL at 06/20/24 0626    vancomycin (VANCOCIN) 1,500 mg in 0.9% NaCl 250 mL intermittent infusion  1,500 mg Intravenous Q24H Tasha Vivar  mL/hr at 06/19/24 1834 1,500 mg at 06/19/24 1834     Current Facility-Administered Medications   Medication Dose Route Frequency Provider Last Rate Last Admin    acetaminophen (TYLENOL) tablet 650 mg  650 mg Oral Q4H PRN Socorro Aceves MD   650 mg at 06/19/24 0830    Or    acetaminophen (TYLENOL) Suppository 650 mg  650 mg Rectal Q4H PRN Socorro Aceves MD        calcium carbonate (TUMS) chewable tablet 1,000 mg  1,000 mg Oral 4x Daily PRN Socorro Aceves MD        glucose gel 15-30 g  15-30 g Oral Q15 Min PRN Socorro Aceves MD        Or    dextrose 50 % injection 25-50 mL  25-50 mL Intravenous Q15 Min PRN Socorro Aceves MD        Or    glucagon injection 1 mg  1 mg Subcutaneous Q15 Min PRN Socorro Aceves MD        HYDROmorphone (DILAUDID) injection 0.2 mg  0.2 mg Intravenous Q6H PRN Nelsy Tee APRN CNP        lidocaine (LMX4) cream   Topical Q1H PRN Socorro Aceves MD        lidocaine (XYLOCAINE) 5 % ointment   Topical TID PRN Nelsy Tee APRN CNP        lidocaine 1 % 0.1-1 mL  0.1-1 mL Other Q1H PRN Socorro Aceves MD        naloxone (NARCAN) injection 0.2 mg  0.2 mg Intravenous Q2 Min PRN Socorro Aceves MD        Or    naloxone  (NARCAN) injection 0.4 mg  0.4 mg Intravenous Q2 Min PRN Socorro Aceves MD        Or    naloxone (NARCAN) injection 0.2 mg  0.2 mg Intramuscular Q2 Min PRN Socorro Aceves MD        Or    naloxone (NARCAN) injection 0.4 mg  0.4 mg Intramuscular Q2 Min PRN Socorro Aceves MD        ondansetron (ZOFRAN ODT) ODT tab 4 mg  4 mg Oral Q6H PRN Socorro Aceves MD        Or    ondansetron (ZOFRAN) injection 4 mg  4 mg Intravenous Q6H PRN Socorro Aceves MD        oxyCODONE IR (ROXICODONE) half-tab 2.5 mg  2.5 mg Oral Q4H PRN Lida Bowers PA-C   2.5 mg at 06/18/24 1328    Or    oxyCODONE (ROXICODONE) tablet 5 mg  5 mg Oral Q4H PRN Lida Bowers PA-C   5 mg at 06/19/24 0830    senna-docusate (SENOKOT-S/PERICOLACE) 8.6-50 MG per tablet 1 tablet  1 tablet Oral BID PRN Socorro Aceves MD        Or    senna-docusate (SENOKOT-S/PERICOLACE) 8.6-50 MG per tablet 2 tablet  2 tablet Oral BID PRN Socorro Aceves MD        sodium chloride (PF) 0.9% PF flush 3 mL  3 mL Intracatheter q1 min prn Socorro Aceves MD   3 mL at 06/18/24 1825            Data:      All new lab and imaging data was reviewed.

## 2024-06-20 NOTE — PROGRESS NOTES
I got a call from the infectious lab. They said that the order that they receive has two culture. 1 fluid taken from aspiration other one is biopsy. Biopsy specimen only have aerobic and aspiration only have anaerobic.  Per infectious lab they will add an anaerobic on aspiration and aerobic from biopsy. They said if theres an issue you call directly at  [phone]- infections lab

## 2024-06-20 NOTE — PLAN OF CARE
Patient A/Ox4, VSS, RA. Incision on the back from biopsy is CDI   Patient now is on vanco. Good intake and output.   CMS inatct except for baseline neuropathy BLE.   Continue to monitor temp. Patient is on low grade fever since he got back from IR. Patient stated I'm feeling hot the room is to warm. I put the room temp to cooler temp.

## 2024-06-20 NOTE — PROGRESS NOTES
"[facility] ACUTE INPATIENT PAIN SERVICE    [facility], [facility], [facility], [facility], [place]   PAIN FOLLOW UP    Requesting provider: Lin Lara PA-C  Reason for consult: discitis osteomyelitis with ongoing pain      Assessment/Plan:  Lance Ibrahim is a 79 year old male who was admitted on 6/18/2024. He initially presented to [facility] with acute on chronic intractable pain with RLE radiculopathy.  History of HFpEF, CKD, gout, chronic low back pain with spinal stimulator, chronic pain syndrome.        shows routine fills of gabapentin, sporadic opioid prescriptions.   -06/06/2024 gabapentin 300 mg #30 for 10 days  -05/28/2024 oxycodone 5 mg #18 for 5 days  -05/23/2024 oxycodone 5 mg #18 for 5 days  -05/22/2024 oxycodone 5 mg tab #6 for 2 days  -05/22/2024 gabapentin 300 mg #30 for 10 days  -03/28/2024 oxycodone 5 mg #15 for 3 days     He is currently followed by [facility] pain clinic and has a spinal stimulator in place which was implanted on April 3.  So far this has been helpful in managing his chronic neuropathy pain.     Infectious disease consulted, consult with IR for possible aspiration/biopsy for cultures, currently holding off on antibiotics until blood cultures return or fevers develop.      Discussed with neurosurgery and patient at the bedside 6/19, current plan is for antibiotic therapy and hold off on surgery for now.  Possibility of spinal cord stimulator removal mentioned if needed to clear the infection.    Reviewed interventional radiology note, lumbar aspiration/biopsy was completed yesterday, 1 mL rust colored cloudy fluid aspirated from L4-5 disc space and sent to lab.  Per hospitalist, will start on IV vancomycin.     Lance reports that his pain is primarily located right side of the mid back and reports it feels like a \"constant ice pick\".  He woke up on Saturday around 1 AM with the ice pick pain and it has been steady ever since.  He reports when he is lying down and not moving " "the pain seems to be manageable and is okay.  If he is sitting or standing up pain significantly increases.  Additionally, the pain in his back seems to increase with movement of the lower extremities.  Denies pain in the legs themselves.  Denies numbness or tingling.  Pain has consistently been a 6.5/10.     He also endorses chronic left knee pain, had replacement scheduled for May however this was pushed due to UTI.  Now rescheduled for September 19 at [facility].  He has had sporadic opioid use, steroid injections for the knee.  He has difficulty with walking at baseline related to the knee pain.    Lance reports during the previous hospitalization he was given MiraLAX and he did not know and reported this cause significantly loose stools.  Would not like MiraLAX this admission.  He is aware he has as needed senna ordered, did not want it scheduled at this time.     This morning, Lance is reporting some improvement in his back pain.  He reports he continues to have pain in the back when he moves his lower extremities.  In general, he is finding his medications helpful.  He states the \"medications help the pain but are not treating the underlying source of the pain\".  He is aware he is receiving IV antibiotics at this time to treat the infection.  He does not find the lidocaine very helpful, will adjust orders to as needed.  He likes to use diclofenac for his chronic knee pain, nurse was in the room to administer and he will try it on his back this morning as well.    Reviewed use of oral medications, decreasing IV to every 6 hours as needed for pain not controlled with oral medications only.  Patient in agreement with plan.    In the last 24 hours, Lance has received: 1 (5mg) oxycodone, 1 (50mcg) iv fentanyl  Opioid use decreased, pain improving.    Adjuvants: 4 (650mg) tylenol, 1 (10mg) flexeril, 2 (250mg) robaxin     PLAN: Acute right-sided back pain secondary to discitis osteomyelitis at L4-L5.  Per " neurosurgery, no urgent surgical intervention at this time.  Antibiotics per infectious disease.  Multimodal Medication Therapy:   Adjuvants:   Robaxin 250 mg 3 times daily   lidocaine 3 times daily switched to prn  diclofenac 4 times daily  acetaminophen 650 mg every 6 hours  Acetaminophen 650mg q4h prn   No po or iv NSAIDs due to cardiac history, low hemoglobin, CKD  Opioids: 2.5-5mg oxycodone q4h prn   IV dilaudid 0.2mg decreased to q6h prn  Non-medication interventions- Ice, heat prn   Constipation Prophylaxis- prn senna   Follow up /Discharge Recommendations - We recommend prescribing the following at the time of discharge:  TBD    Subjective:  Denies nausea, vomiting, diarrhea, constipation, chest pain, shortness of breath.  Endorsing 2/10 pain to the right side of the mid back.     History   Drug Use No         Tobacco Use      Smoking status: Former        Packs/day: 0.00        Types: Cigarettes        Quit date: 3/17/1993        Years since quittin.2      Smokeless tobacco: Never      Objective:  Vital signs in last 24 hours:  B/P: 136/82, T: 98.2, P: 93, R: 16   Blood pressure 136/82, pulse 93, temperature 98.2  F (36.8  C), temperature source Oral, resp. rate 16, SpO2 97%.      Review of Systems:   As per subjective, all others negative.    Physical Exam  General: in no apparent distress, laying in bed appears comfortable  HEENT: Head normocephalic atraumatic, oral mucosa moist. Sclerae anicteric  Resp: Respirations unlabored  Skin: Warm and dry  Extremities: No peripheral edema, able to move all 4 extremities spontaneously  Psych: Normal affect, pleasant  Neuro: Alert and oriented x 3    Imaging:  Personally Reviewed.    Results for orders placed or performed during the hospital encounter of 24   MR Lumbar Spine w/o & w Contrast    Impression    IMPRESSION:  1.  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  2.  Epidural abscess in the ventral epidural space, craniocaudal extension of 5 cm (from inferior  L4 through inferior S1 level).  3.  At L4-5, severe spinal stenosis secondary to the epidural abscess.  4.  At L5-S1, moderate spinal stenosis secondary to the epidural abscess.        Lab Results:  Personally Reviewed.   Last Comprehensive Metabolic Panel:  Sodium   Date Value Ref Range Status   06/19/2024 135 135 - 145 mmol/L Final     Comment:     Reference intervals for this test were updated on 09/26/2023 to more accurately reflect our healthy population. There may be differences in the flagging of prior results with similar values performed with this method. Interpretation of those prior results can be made in the context of the updated reference intervals.    04/29/2021 139 133 - 144 mmol/L Final     Potassium   Date Value Ref Range Status   06/19/2024 4.4 3.4 - 5.3 mmol/L Final   08/27/2022 4.6 3.5 - 5.0 mmol/L Final   04/29/2021 4.7 3.4 - 5.3 mmol/L Final     Chloride   Date Value Ref Range Status   06/19/2024 101 98 - 107 mmol/L Final   08/27/2022 108 (H) 98 - 107 mmol/L Final   04/29/2021 107 94 - 109 mmol/L Final     Carbon Dioxide   Date Value Ref Range Status   04/29/2021 27 20 - 32 mmol/L Final     Carbon Dioxide (CO2)   Date Value Ref Range Status   06/19/2024 21 (L) 22 - 29 mmol/L Final   08/27/2022 22 22 - 31 mmol/L Final     Anion Gap   Date Value Ref Range Status   06/19/2024 13 7 - 15 mmol/L Final   08/27/2022 8 5 - 18 mmol/L Final   04/29/2021 5 3 - 14 mmol/L Final     Glucose   Date Value Ref Range Status   08/27/2022 131 (H) 70 - 125 mg/dL Final   04/29/2021 212 (H) 70 - 99 mg/dL Final     GLUCOSE BY METER POCT   Date Value Ref Range Status   06/20/2024 180 (H) 70 - 99 mg/dL Final     Urea Nitrogen   Date Value Ref Range Status   06/19/2024 23.3 (H) 8.0 - 23.0 mg/dL Final   08/27/2022 16 8 - 28 mg/dL Final   04/29/2021 27 7 - 30 mg/dL Final     Creatinine   Date Value Ref Range Status   06/19/2024 1.16 0.67 - 1.17 mg/dL Final   04/29/2021 1.19 0.66 - 1.25 mg/dL Final     GFR Estimate   Date  "Value Ref Range Status   06/19/2024 64 >60 mL/min/1.73m2 Final     Comment:     eGFR calculated using 2021 CKD-EPI equation.   04/29/2021 59 (L) >60 mL/min/[1.73_m2] Final     Comment:     Non  GFR Calc  Starting 12/18/2018, serum creatinine based estimated GFR (eGFR) will be   calculated using the Chronic Kidney Disease Epidemiology Collaboration   (CKD-EPI) equation.       GFR, ESTIMATED POCT   Date Value Ref Range Status   12/03/2021 15 (L) >60 mL/min/1.73m2 Final     Calcium   Date Value Ref Range Status   06/19/2024 9.1 8.8 - 10.2 mg/dL Final   04/29/2021 9.3 8.5 - 10.1 mg/dL Final        UA: No results found for: \"UAMP\", \"UBARB\", \"BENZODIAZEUR\", \"UCANN\", \"UCOC\", \"OPIT\", \"UPCP\"     Please see A&P for additional details of medical decision making.  MANAGEMENT DISCUSSED with the following over the past 24 hours:   -Bedside RN  NOTE(S)/MEDICAL RECORDS REVIEWED over the past 24 hours:   -Hospitalist  -Interventional radiology  Tests REVIEWED in the past 24 hours:  - BMP  Medical complexity over the past 24 hours:  - Parenteral (IV) CONTROLLED SUBSTANCES ordered  - Prescription DRUG MANAGEMENT performed      [facility]  Acute Care Pain Management Program   Hours of pain coverage Mon-Fri [id number], afterhours please call the house officer   [facility] (AZAEL, [place], [place], [place])   Page via [employer]- Click HERE to page Nelsy or Fiordaliza text web console -Click for Vocnaila            "

## 2024-06-20 NOTE — PROGRESS NOTES
Will sign off at this time   Please reconsult if there are concerns for worsening exam and/or worsening abscess    Dr. Conklin recommends follow up in 6 weeks with MRI w/wo contrast prior - our office will coordinate     Dr. Conklin has seen and examined patient 6/19 and in agreement with plans     Lin Jasso PA-C  [facility]  [address] [place] [id number]  Tel [phone]  Fax [phone]  Text page via [facility] Paging/Directory

## 2024-06-20 NOTE — CONSULTS
"SPIRITUAL HEALTH SERVICES - Consult Note  [facility]    Referral Source: Pt responded \"Yes\" to \"Do you have any spiritual or Mandaen beliefs that will affect your care\".    I oriented Lance to [facility]. Pt shared he does not have needs at this time because his  from [facility] in [place] plans to visit him on Sunday. Lance said he has family that live in [place], close to his assisted living facility in [place] and feels well supported.     Plan: [facility] remains available should any other needs arise.       Jyoti Pineda  Chaplain Resident    [facility] routine referrals *[phone]  [facility] available 24/7 for emergent requests/referrals, either by paging the on-call  or by entering an ASAP/STAT consult in Epic (this will also page the on-call ).     "

## 2024-06-21 ENCOUNTER — HOME INFUSION (PRE-WILLOW HOME INFUSION) (OUTPATIENT)
Dept: PHARMACY | Facility: CLINIC | Age: 79
End: 2024-06-21
Payer: COMMERCIAL

## 2024-06-21 ENCOUNTER — APPOINTMENT (OUTPATIENT)
Dept: PHYSICAL THERAPY | Facility: CLINIC | Age: 79
DRG: 477 | End: 2024-06-21
Attending: HOSPITALIST
Payer: COMMERCIAL

## 2024-06-21 LAB
CREAT SERPL-MCNC: 1.24 MG/DL (ref 0.67–1.17)
EGFRCR SERPLBLD CKD-EPI 2021: 59 ML/MIN/1.73M2
GLUCOSE BLDC GLUCOMTR-MCNC: 110 MG/DL (ref 70–99)
GLUCOSE BLDC GLUCOMTR-MCNC: 145 MG/DL (ref 70–99)
GLUCOSE BLDC GLUCOMTR-MCNC: 154 MG/DL (ref 70–99)
GLUCOSE BLDC GLUCOMTR-MCNC: 161 MG/DL (ref 70–99)
GLUCOSE BLDC GLUCOMTR-MCNC: 207 MG/DL (ref 70–99)
POTASSIUM SERPL-SCNC: 4.1 MMOL/L (ref 3.4–5.3)

## 2024-06-21 PROCEDURE — 99232 SBSQ HOSP IP/OBS MODERATE 35: CPT

## 2024-06-21 PROCEDURE — 84132 ASSAY OF SERUM POTASSIUM: CPT | Performed by: HOSPITALIST

## 2024-06-21 PROCEDURE — 120N000001 HC R&B MED SURG/OB

## 2024-06-21 PROCEDURE — 250N000013 HC RX MED GY IP 250 OP 250 PS 637: Performed by: HOSPITALIST

## 2024-06-21 PROCEDURE — 250N000013 HC RX MED GY IP 250 OP 250 PS 637

## 2024-06-21 PROCEDURE — 99232 SBSQ HOSP IP/OBS MODERATE 35: CPT | Performed by: HOSPITALIST

## 2024-06-21 PROCEDURE — 36415 COLL VENOUS BLD VENIPUNCTURE: CPT | Performed by: HOSPITALIST

## 2024-06-21 PROCEDURE — 250N000011 HC RX IP 250 OP 636: Performed by: HOSPITALIST

## 2024-06-21 PROCEDURE — 82565 ASSAY OF CREATININE: CPT | Performed by: HOSPITALIST

## 2024-06-21 PROCEDURE — 258N000003 HC RX IP 258 OP 636: Mod: JZ | Performed by: HOSPITALIST

## 2024-06-21 PROCEDURE — 97530 THERAPEUTIC ACTIVITIES: CPT | Mod: GP

## 2024-06-21 PROCEDURE — 250N000013 HC RX MED GY IP 250 OP 250 PS 637: Performed by: PHYSICIAN ASSISTANT

## 2024-06-21 PROCEDURE — 97162 PT EVAL MOD COMPLEX 30 MIN: CPT | Mod: GP

## 2024-06-21 PROCEDURE — 97116 GAIT TRAINING THERAPY: CPT | Mod: GP

## 2024-06-21 RX ORDER — HYDROMORPHONE HCL IN WATER/PF 6 MG/30 ML
0.2 PATIENT CONTROLLED ANALGESIA SYRINGE INTRAVENOUS EVERY 8 HOURS PRN
Status: DISCONTINUED | OUTPATIENT
Start: 2024-06-21 | End: 2024-06-24

## 2024-06-21 RX ORDER — ASPIRIN 81 MG/1
81 TABLET ORAL DAILY
Status: DISCONTINUED | OUTPATIENT
Start: 2024-06-21 | End: 2024-06-25 | Stop reason: HOSPADM

## 2024-06-21 RX ADMIN — OXYCODONE HYDROCHLORIDE 5 MG: 5 TABLET ORAL at 09:30

## 2024-06-21 RX ADMIN — DICLOFENAC 2 G: 10 GEL TOPICAL at 13:46

## 2024-06-21 RX ADMIN — METHOCARBAMOL 250 MG: 500 TABLET ORAL at 16:33

## 2024-06-21 RX ADMIN — OXYCODONE HYDROCHLORIDE 5 MG: 5 TABLET ORAL at 00:59

## 2024-06-21 RX ADMIN — SODIUM BICARBONATE 650 MG TABLET 1950 MG: at 19:45

## 2024-06-21 RX ADMIN — ACETAMINOPHEN 650 MG: 325 TABLET, FILM COATED ORAL at 01:00

## 2024-06-21 RX ADMIN — ACETAMINOPHEN 650 MG: 325 TABLET, FILM COATED ORAL at 13:46

## 2024-06-21 RX ADMIN — SODIUM BICARBONATE 650 MG TABLET 1950 MG: at 08:08

## 2024-06-21 RX ADMIN — FERROUS GLUCONATE 324 MG: 324 TABLET ORAL at 08:08

## 2024-06-21 RX ADMIN — URSODIOL 300 MG: 300 CAPSULE ORAL at 19:44

## 2024-06-21 RX ADMIN — SODIUM BICARBONATE 650 MG TABLET 1950 MG: at 13:46

## 2024-06-21 RX ADMIN — COLCHICINE 0.6 MG: 0.6 TABLET ORAL at 08:08

## 2024-06-21 RX ADMIN — GLIPIZIDE 2.5 MG: 2.5 TABLET, FILM COATED, EXTENDED RELEASE ORAL at 08:08

## 2024-06-21 RX ADMIN — ASPIRIN 81 MG: 81 TABLET, COATED ORAL at 16:33

## 2024-06-21 RX ADMIN — METHOCARBAMOL 250 MG: 500 TABLET ORAL at 08:08

## 2024-06-21 RX ADMIN — ACETAMINOPHEN 650 MG: 325 TABLET, FILM COATED ORAL at 19:44

## 2024-06-21 RX ADMIN — Medication 2 CAPSULE: at 13:46

## 2024-06-21 RX ADMIN — DICLOFENAC 2 G: 10 GEL TOPICAL at 21:33

## 2024-06-21 RX ADMIN — GABAPENTIN 100 MG: 100 CAPSULE ORAL at 16:33

## 2024-06-21 RX ADMIN — LIDOCAINE: 50 OINTMENT TOPICAL at 01:01

## 2024-06-21 RX ADMIN — OXYCODONE HYDROCHLORIDE 5 MG: 5 TABLET ORAL at 19:58

## 2024-06-21 RX ADMIN — INSULIN ASPART 1 UNITS: 100 INJECTION, SOLUTION INTRAVENOUS; SUBCUTANEOUS at 08:11

## 2024-06-21 RX ADMIN — INSULIN ASPART 1 UNITS: 100 INJECTION, SOLUTION INTRAVENOUS; SUBCUTANEOUS at 17:17

## 2024-06-21 RX ADMIN — DICLOFENAC 2 G: 10 GEL TOPICAL at 08:10

## 2024-06-21 RX ADMIN — URSODIOL 300 MG: 300 CAPSULE ORAL at 08:08

## 2024-06-21 RX ADMIN — ACETAMINOPHEN 650 MG: 325 TABLET, FILM COATED ORAL at 05:58

## 2024-06-21 RX ADMIN — GABAPENTIN 100 MG: 100 CAPSULE ORAL at 21:32

## 2024-06-21 RX ADMIN — VANCOMYCIN HYDROCHLORIDE 1500 MG: 10 INJECTION, POWDER, LYOPHILIZED, FOR SOLUTION INTRAVENOUS at 18:04

## 2024-06-21 RX ADMIN — GABAPENTIN 100 MG: 100 CAPSULE ORAL at 08:08

## 2024-06-21 RX ADMIN — METHOCARBAMOL 250 MG: 500 TABLET ORAL at 21:32

## 2024-06-21 RX ADMIN — LISINOPRIL 10 MG: 10 TABLET ORAL at 08:08

## 2024-06-21 ASSESSMENT — ACTIVITIES OF DAILY LIVING (ADL)
ADLS_ACUITY_SCORE: 37
ADLS_ACUITY_SCORE: 39
ADLS_ACUITY_SCORE: 37
ADLS_ACUITY_SCORE: 39
ADLS_ACUITY_SCORE: 37
ADLS_ACUITY_SCORE: 37
ADLS_ACUITY_SCORE: 39
ADLS_ACUITY_SCORE: 38
ADLS_ACUITY_SCORE: 37
ADLS_ACUITY_SCORE: 39
ADLS_ACUITY_SCORE: 39
ADLS_ACUITY_SCORE: 37
ADLS_ACUITY_SCORE: 39
ADLS_ACUITY_SCORE: 37
ADLS_ACUITY_SCORE: 39
ADLS_ACUITY_SCORE: 37
ADLS_ACUITY_SCORE: 37
ADLS_ACUITY_SCORE: 39
ADLS_ACUITY_SCORE: 39
ADLS_ACUITY_SCORE: 37
ADLS_ACUITY_SCORE: 39
ADLS_ACUITY_SCORE: 39
ADLS_ACUITY_SCORE: 38

## 2024-06-21 NOTE — PROGRESS NOTES
Pt vitally stable on RA, Tolerating regular diet, needs met this shift, A1GB/W,  numbness to BLE  baseline per patient d/t neuropathy, Incision on the back CDI, PIV SL w/ intermittent IV abx,

## 2024-06-21 NOTE — PROGRESS NOTES
[facility]    Received request for benefit check should pt require home IV abx. Patient does have coverage for IV abx through their [facility] Medicare Advantage plan, however the drug must be on a CADD pump for coverage. If not on a CADD pump, then the patient would be self-pay due to no coverage.     Patient has an 80/20 coverage until they have met their out of pocket of $2900 (patient has met $1010.54 at this time).      Please note that [facility] requires a central line for all medications administered via CADD pump in the home setting.    For nursing, patient should have coverage if homebound, however [facility] is not contracted with Medicare and an outside nursing agency would be utilized instead. If patient is not homebound, there is no coverage and I can see patient if patient agrees to self-pay for $90 per visit.     Thank you    Rukhsana Red RN  [facility] Liaison  [phone] (Mon thru Fri 8am - 5pm)  [phone] Office     Strong peripheral pulses/Capillary refill less/equal to 2 seconds

## 2024-06-21 NOTE — PLAN OF CARE
Goal Outcome Evaluation:    .Date/Time 6/21/24 [id number]    Trauma/Ortho/Medical (Choose one)  Medical    Diagnosis: Discitis/Osteomyelitis L4-L5 with epidural abscess.  POD#: N/A  Mental Status: A&Ox  Activity/dangle Up A2 gb/walker to BSC and recliner for meals.   Diet: Regular diet, good appetite.   Pain: Managed with PRN oxycodone x1, scheduled robaxin and tylenol.   Barnes/Voiding: Voiding spontaneously in urinal.  Tele/Restraints/Iso: N/A  02/LDA: PIV SL, IV vancomycin Q24hrs.  D/C Date: TBD, likely to TCU pending placement.   Other Info: IR site- CDI. ID following for antibiotic management, patient will need long-term IV antibiotics at discharge.

## 2024-06-21 NOTE — PROGRESS NOTES
"[facility] ACUTE INPATIENT PAIN SERVICE    [facility], [facility], [facility], [facility], [place]   PAIN FOLLOW UP    Requesting provider: Lin Lara PA-C  Reason for consult: discitis osteomyelitis with ongoing pain      Assessment/Plan:  Lance Ibrahim is a 79 year old male who was admitted on 6/18/2024. He initially presented to [facility] with acute on chronic intractable pain with RLE radiculopathy.  History of HFpEF, CKD, gout, chronic low back pain with spinal stimulator, chronic pain syndrome.        shows routine fills of gabapentin, sporadic opioid prescriptions.   -06/06/2024 gabapentin 300 mg #30 for 10 days  -05/28/2024 oxycodone 5 mg #18 for 5 days  -05/23/2024 oxycodone 5 mg #18 for 5 days  -05/22/2024 oxycodone 5 mg tab #6 for 2 days  -05/22/2024 gabapentin 300 mg #30 for 10 days  -03/28/2024 oxycodone 5 mg #15 for 3 days     He is currently followed by [facility] pain clinic and has a spinal stimulator in place which was implanted on April 3.  So far this has been helpful in managing his chronic neuropathy pain.     Infectious disease consulted, consult with IR for possible aspiration/biopsy for cultures, currently holding off on antibiotics until blood cultures return or fevers develop.      Discussed with neurosurgery and patient at the bedside 6/19, current plan is for antibiotic therapy and hold off on surgery for now.  Possibility of spinal cord stimulator removal mentioned if needed to clear the infection.  Per neurosurgery note, signing off at this time.     Lumbar aspiration/biopsy was completed 6/19, 1 mL rust colored cloudy fluid aspirated from L4-5 disc space and sent to lab.  Reviewed ID note, continue with IV vanco, further reccomendations to follow once final culture results return.      Lance reports that his pain is primarily located right side of the mid back and reports it feels like a \"constant ice pick\".  He woke up on Saturday around 1 AM with the ice pick pain and it has " "been steady ever since.  He reports when he is lying down and not moving the pain seems to be manageable and is okay.  If he is sitting or standing up pain significantly increases.  Additionally, the pain in his back seems to increase with movement of the lower extremities.  Denies pain in the legs themselves.  Denies numbness or tingling.  Pain has consistently been a 6.5/10.     He also endorses chronic left knee pain, had replacement scheduled for May however this was pushed due to UTI.  Now rescheduled for September 19 at [facility].  He has had sporadic opioid use, steroid injections for the knee.  He has difficulty with walking at baseline related to the knee pain.     Lance reports during the previous hospitalization he was given MiraLAX and he did not know and reported this cause significantly loose stools.  Would not like MiraLAX this admission.  He is aware he has as needed senna ordered, did not want it scheduled at this time.     This morning, Lance is reporting slight/minimal improvement in his back pain.  He reports he continues to have pain in the back when he moves his lower extremities. This morning, he reports pain intensified after moving from the chair to the bed. He has difficulty at baseline with ambulation related to his \"bone on bone\" left knee. At home, he typically uses a walker for a max distance of 15 feet, otherwise uses a wheelchair to get around. In general he finds his pain medications helpful. He is looking forward to getting the results of his lumbar puncture biopsy to make sure he has the \"right antibiotic\".     Lance tells me he was previously taking gabapentin for his neuropathy but stopped taking it after his spinal stimulator was implanted so he was wondering why it was reordered. Discussed that this could be helpful with his pain he is experiencing now, so he would like to continue taking it.      Reviewed use of oral medications, decreasing IV to every 8 hours as needed " for pain not controlled with oral medications only.  Patient in agreement with plan.     In the last 24 hours, Lance has received: 1 (5mg) oxycodone, 1 (0.2mg) iv dilaudid     Adjuvants: 4 (650mg) tylenol, 3 (250mg) robaxin, 1 (100mg) gabapentin     PLAN: Acute right-sided back pain secondary to discitis osteomyelitis at L4-L5.  Per neurosurgery, no urgent surgical intervention at this time.  Antibiotics per infectious disease.  Multimodal Medication Therapy:   Adjuvants:   Robaxin 250 mg 3 times daily   lidocaine 3 times daily prn  diclofenac 4 times daily  acetaminophen 650 mg every 6 hours  Gabapentin 100mg tid - ordered by hospitalist yesterday  Acetaminophen 650mg q4h prn   No po or iv NSAIDs due to cardiac history, low hemoglobin, CKD  Opioids: 2.5-5mg oxycodone q4h prn   IV dilaudid 0.2mg decreased to q8h prn - would recommend discontinuing over the weekend once optimizing oral doses and pain controlled  Non-medication interventions- Ice, heat prn   Constipation Prophylaxis- prn senna   Follow up /Discharge Recommendations - We recommend prescribing the following at the time of discharge:  TBD      Subjective:  Endorsing 4/10 pain in the back.  Denies nausea, vomiting, diarrhea, constipation, chest pain, shortness of breath.       History   Drug Use No         Tobacco Use      Smoking status: Former        Packs/day: 0.00        Types: Cigarettes        Quit date: 3/17/1993        Years since quittin.2      Smokeless tobacco: Never      Objective:  Vital signs in last 24 hours:  B/P: 129/76, T: 97.7, P: 93, R: 18   Blood pressure 129/76, pulse 93, temperature 97.7  F (36.5  C), temperature source Oral, resp. rate 18, SpO2 95%.      Review of Systems:   As per subjective, all others negative.    Physical Exam  General: in no apparent distress, laying in bed and appears comfortable   HEENT: Head normocephalic atraumatic, oral mucosa moist. Sclerae anicteric  Resp: Respirations unlabored, on room air   Skin:  Warm and dry   Extremities: Able to move all four extremities spontaneously, limited LLE   Psych: Normal affect, pleasant  Neuro: Alert and oriented x3    Imaging:  Personally Reviewed.    Results for orders placed or performed during the hospital encounter of 06/18/24   MR Lumbar Spine w/o & w Contrast    Impression    IMPRESSION:  1.  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  2.  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  3.  At L4-5, severe spinal stenosis secondary to the epidural abscess.  4.  At L5-S1, moderate spinal stenosis secondary to the epidural abscess.        Lab Results:  Personally Reviewed.   Last Comprehensive Metabolic Panel:  Sodium   Date Value Ref Range Status   06/20/2024 136 135 - 145 mmol/L Final     Comment:     Reference intervals for this test were updated on 09/26/2023 to more accurately reflect our healthy population. There may be differences in the flagging of prior results with similar values performed with this method. Interpretation of those prior results can be made in the context of the updated reference intervals.    04/29/2021 139 133 - 144 mmol/L Final     Potassium   Date Value Ref Range Status   06/20/2024 4.5 3.4 - 5.3 mmol/L Final   08/27/2022 4.6 3.5 - 5.0 mmol/L Final   04/29/2021 4.7 3.4 - 5.3 mmol/L Final     Chloride   Date Value Ref Range Status   06/20/2024 99 98 - 107 mmol/L Final   08/27/2022 108 (H) 98 - 107 mmol/L Final   04/29/2021 107 94 - 109 mmol/L Final     Carbon Dioxide   Date Value Ref Range Status   04/29/2021 27 20 - 32 mmol/L Final     Carbon Dioxide (CO2)   Date Value Ref Range Status   06/20/2024 23 22 - 29 mmol/L Final   08/27/2022 22 22 - 31 mmol/L Final     Anion Gap   Date Value Ref Range Status   06/20/2024 14 7 - 15 mmol/L Final   08/27/2022 8 5 - 18 mmol/L Final   04/29/2021 5 3 - 14 mmol/L Final     Glucose   Date Value Ref Range Status   08/27/2022 131 (H) 70 - 125  "mg/dL Final   04/29/2021 212 (H) 70 - 99 mg/dL Final     GLUCOSE BY METER POCT   Date Value Ref Range Status   06/21/2024 154 (H) 70 - 99 mg/dL Final     Urea Nitrogen   Date Value Ref Range Status   06/20/2024 21.5 8.0 - 23.0 mg/dL Final   08/27/2022 16 8 - 28 mg/dL Final   04/29/2021 27 7 - 30 mg/dL Final     Creatinine   Date Value Ref Range Status   06/20/2024 1.17 0.67 - 1.17 mg/dL Final   04/29/2021 1.19 0.66 - 1.25 mg/dL Final     GFR Estimate   Date Value Ref Range Status   06/20/2024 63 >60 mL/min/1.73m2 Final     Comment:     eGFR calculated using 2021 CKD-EPI equation.   04/29/2021 59 (L) >60 mL/min/[1.73_m2] Final     Comment:     Non  GFR Calc  Starting 12/18/2018, serum creatinine based estimated GFR (eGFR) will be   calculated using the Chronic Kidney Disease Epidemiology Collaboration   (CKD-EPI) equation.       GFR, ESTIMATED POCT   Date Value Ref Range Status   12/03/2021 15 (L) >60 mL/min/1.73m2 Final     Calcium   Date Value Ref Range Status   06/20/2024 9.2 8.8 - 10.2 mg/dL Final   04/29/2021 9.3 8.5 - 10.1 mg/dL Final        UA: No results found for: \"UAMP\", \"UBARB\", \"BENZODIAZEUR\", \"UCANN\", \"UCOC\", \"OPIT\", \"UPCP\"       Please see A&P for additional details of medical decision making.  NOTE(S)/MEDICAL RECORDS REVIEWED over the past 24 hours:   -Hospitalist: started on scheduled gabapentin, PTA was as needed  -Infectious disease  -Neurosurgery  Tests REVIEWED in the past 24 hours:  - glucose  Medical complexity over the past 24 hours:  - Parenteral (IV) CONTROLLED SUBSTANCES ordered  - Prescription DRUG MANAGEMENT performed    Nelsy WOODRUFF FNP-BC  Acute Care Pain Management Program   Hours of pain coverage Mon-Fri [phone], afterhours please call the house officer   [facility] (AZAEL, JNs, [place], RH)   Page via [employer]- Click HERE to page Nelsy or Vocera text web console -Click for Vocera            "

## 2024-06-21 NOTE — PROGRESS NOTES
[facility]  Hospitalist Progress Note   06/21/2024          Assessment and Plan:       Lance Ibrahim is a 79 year old male with history of heart failure with preserved ejection fraction, hypertension, dyslipidemia, CKD, gout, BPH, type 2 diabetes and chronic low back pain for which he is folllowed by [facility] and has an implantable spine stimulator ([employer], MRI conditional 4/2024) in place. He initially presented to [facility] with acute on chronic, intractable pain with RLE radiculopathy. CT was concerning for discitis-osteomyelitis at L4-5 with epidural/intraforaminal phlegmon vs abscess at L4-S1 with severe foraminal narrowing. Transferred to [facility] for Neurosurgery evaluation and ID consultation.     Status post IR guided L4-L5 disc aspiration and L4 biopsy.  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  Epidural abscess.  Severe spinal stenosis.  Acute on chronic back pain with RLE radiculopathy  --Presented with acute on chronic back pain located on the right side of his back.  Significant tenderness to palpation at approximately the L3-L4 level on the right side with associated RLE radiculopathy. No bladder or bowel incontinence, no saddle anesthesia. Uses a walker for shorter distances and wheelchair for longer distances at baseline. Significant pain with weight bearing. No recent trauma or falls, but did get a trigger point injection by [facility] clinic on 6/14. Also note recent hospitalization for severe sepsis secondary to Klebsiella UTI for which patient completed course of antibiotics.   *Afebrile since admission, hemodynamically stable.  WBC WNL. CRP 12.4.   *CT lumbar spine as above (review formal report for complete details)   *MRI lumbar spine Discitis osteomyelitis at L4-5. 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 At L4-5, severe spinal stenosis secondary to the epidural abscess. At  L5-S1, moderate spinal stenosis secondary to the epidural abscess.    --Status post IR guided L4-L5 disc aspiration and L4 biopsy. 1 ml of rust colored cloudy fluid aspirated from L4-5 disc space, sent to lab. Large core specimen also obtained from L4 interior endplate, sent to lab.  --Started on IV vancomycin on 6/19 post IR guided aspiration.  Continue intravenous vancomycin.  Follow final cultures..  --Noted allergies to penicillin, cephalosporins and sulfa.  --Infectious disease following.  --Neurosurgery following, no surgical intervention.  Pain management following.  Appreciate multidisciplinary team input.  Trend WBC count, fever curve.  --Continue as needed Tylenol, as needed oxycodone, as needed IV Dilaudid.  Continue as needed Flexeril.  Note pt utilizes NSAIDs solely at home for pain control.  Minimize narcotic use as able to.  Rehab team following.   SW following for discharge disposition.    Recent severe sepsis in the setting of Klebsiella UTI:   Hospitalization on 5/16/2024-5/22/2024 due to severe sepsis related to UTI caused by Klebsiella oxytoca. Patient was on IV Vanco, Levaquin, Cefepime, and Ceftriaxone in the hospital. He was discharged on oral Cefdinir and Levaquin. No current sx of UTI.   -UA with trace blood in urine, otherwise unremarkable.   Urine cultures with less than 10,000 mixed fred.  CT A/P on admission negative for renal calculi or hydronephrosis, note left renal cyst.      Uncontrolled type 2 diabetes mellitus with a hemoglobin A1c of 8.0.  Peripheral neuropathy.  PTA on glipizide, semaglutide, as needed gabapentin.  Continue PTA glipizide.  Will start on scheduled gabapentin 100 mg 3 times daily.  Started on insulin Lantus on 6/19, continue insulin Lantus 6 units twice daily.    Continue sliding scale insulin.  Monitor blood sugars, optimize regimen.  Hypoglycemia protocol in place.    CAD, subtotal occlusion in small distal LAD.  HFpEF, not in acute exacerbation   Mild aortic  stenosis   HTN, HLD.   Denies any chest pain or palpitations or shortness of breath.    Euvolemic.  Blood pressure within normal limits.  Echo 5/16/24 with preserved EF, borderline global hypokinesia, wall motion consistent with conduction abnormality.   Hold PTA torsemide.  Reassess in a.m. for resumption.  Continue PTA lisinopril 10 mg oral daily.  PTA aspirin was on hold resumed 6/21.     CKD IIIa:   Baseline creatinine 1.3-1.6 more recently. Stable on admission.   Continue PTA sodium bicarbonate.     Normocytic anemia: Baseline Hgb 8-9 range. 10.2 on admission.   - Monitor   Continue PTA iron supplements.     Gout: Not in a flare.  Continue PTA colchicine.     Biliary obstruction with stricture s/p EUS/ERCP 4/30/24 with sphincterotomy and temporary stent placement   *Admission CT notes common bile duct stent with slight dilatation of the intrahepatic bile ducts. A tiny portion of the distal stent appears fractured, of doubtful clinical significance. Pt without abdominal pain.   Continue PTA Ursodiol 300 mg BID    Hx of TIA, 1993, no residual deficits  PAD s/p toe amputation   Resume PTA aspirin.     Baseline R>L DELFINO, mild     Physical deconditioning from medical illness, senile frailty.  [facility] [place]  Ambulation: walker for short distances, wheelchair for longer distances  Rehab team following, will likely need TCU.    Obesity with a BMI of 30.11.  Increase all-cause mortality and morbidity.  Consider lifestyle modification with diet and exercise as able to.    Sleep apnea:   Does not use CPAP  Recommend sleep studies as outpatient.    Orders Placed This Encounter      Regular Diet Adult      DVT Prophylaxis: sCDs, ambulate. Pharmacological DVT prophylaxis until neurosurgery, IR input.  Code Status: Full Code  Disposition: Expected discharge in 1-2 days pending final cultures, safe discharge plan in place.    Medically Ready for Discharge: Anticipated in 2-4 Days     Discussed with patient, bedside  RN  >35 minutes spent by me on the date of service doing chart review, history, exam, documentation & further activities per the note.      Tasha Vivar MD        Interval History:        Patient lying in bed.  Denies any chest pain or palpitation.  Denies any headache or dizziness.  Denies any nausea or vomiting.  Denies any new tingling or numbness.  Denies any new urinary or bowel incontinence.  Per report up with assistance of 1 and gait belt and walker.  Complaining of ongoing pain in his back.  Has been receiving scheduled Flexeril, as needed oxycodone.         Physical Exam:        Physical Exam   Temp:  [97.5  F (36.4  C)-99.1  F (37.3  C)] 97.5  F (36.4  C)  Pulse:  [] 93  Resp:  [16-18] 16  BP: (124-141)/(69-79) 127/76  SpO2:  [95 %-100 %] 98 %    Intake/Output Summary (Last 24 hours) at 6/19/2024 1456  Last data filed at 6/19/2024 0900  Gross per 24 hour   Intake 1000 ml   Output 1250 ml   Net -250 ml     PHYSICAL EXAM  GENERAL: Patient is in no distress. Alert and oriented.  HEART: Regular rate and rhythm. S1S2.   LUNGS: Respirations unlabored  ABDOMEN: Soft, no abdominal tenderness, bowel sounds heard   NEURO: Moving all extremities.  EXTREMITIES: trace pedal edema.  SKIN: Warm, dry. No rash  PSYCHIATRY Cooperative       Medications:        Current Facility-Administered Medications   Medication Dose Route Frequency Provider Last Rate Last Admin    acetaminophen (TYLENOL) tablet 650 mg  650 mg Oral Q6H Nelsy Tee APRN CNP   650 mg at 06/21/24 0558    colchicine (COLCRYS) tablet 0.6 mg  0.6 mg Oral Daily Tasha Vivar MD   0.6 mg at 06/21/24 0808    diclofenac (VOLTAREN) 1 % topical gel 2 g  2 g Topical 4x Daily Nelsy Tee APRN CNP   2 g at 06/21/24 0810    ferrous gluconate (FERGON) tablet 324 mg  324 mg Oral Daily Tasha Vivar MD   324 mg at 06/21/24 0808    gabapentin (NEURONTIN) capsule 100 mg  100 mg Oral TID Tasha Vivar MD   100 mg at 06/21/24 0808    glipiZIDE  (GLUCOTROL XL) 24 hr tablet 2.5 mg  2.5 mg Oral Daily with breakfast Tasha Vivar MD   2.5 mg at 06/21/24 0808    insulin aspart (NovoLOG) injection (RAPID ACTING)  1-3 Units Subcutaneous TID AC Socorro Aceves MD   1 Units at 06/21/24 0811    insulin aspart (NovoLOG) injection (RAPID ACTING)  1-3 Units Subcutaneous At Bedtime Socorro Aceves MD   1 Units at 06/20/24 2154    insulin glargine (LANTUS PEN) injection 6 Units  6 Units Subcutaneous BID Tasha Vivar MD   6 Units at 06/21/24 0811    lactobacillus rhamnosus (GG) (CULTURELL) capsule 2 capsule  2 capsule Oral Daily Tasha Vivar MD   2 capsule at 06/20/24 1553    lisinopril (ZESTRIL) tablet 10 mg  10 mg Oral Daily Tasha Vivar MD   10 mg at 06/21/24 0808    methocarbamol (ROBAXIN) half-tab 250 mg  250 mg Oral TID Nelsy Tee APRN CNP   250 mg at 06/21/24 0808    sodium bicarbonate tablet 1,950 mg  1,950 mg Oral TID Tasha Vivar MD   1,950 mg at 06/21/24 0808    sodium chloride (PF) 0.9% PF flush 3 mL  3 mL Intracatheter Q8H Socorro Aceves MD   3 mL at 06/20/24 2057    ursodiol (ACTIGALL) capsule 300 mg  300 mg Oral BID Tasha Vivar MD   300 mg at 06/21/24 0808    vancomycin (VANCOCIN) 1,500 mg in 0.9% NaCl 250 mL intermittent infusion  1,500 mg Intravenous Q24H Tasha Vivar  mL/hr at 06/19/24 1834 1,500 mg at 06/20/24 1753     Current Facility-Administered Medications   Medication Dose Route Frequency Provider Last Rate Last Admin    acetaminophen (TYLENOL) tablet 650 mg  650 mg Oral Q4H PRN Socorro Aceves MD   650 mg at 06/19/24 0830    Or    acetaminophen (TYLENOL) Suppository 650 mg  650 mg Rectal Q4H PRN Socorro Aceves MD        calcium carbonate (TUMS) chewable tablet 1,000 mg  1,000 mg Oral 4x Daily PRN Socorro Aceves MD        glucose gel 15-30 g  15-30 g Oral Q15 Min PRN Socoror Aceves MD        Or    dextrose 50 % injection 25-50 mL   25-50 mL Intravenous Q15 Min PRN Socorro Aceves MD        Or    glucagon injection 1 mg  1 mg Subcutaneous Q15 Min PRN Socorro Aceves MD        famotidine (PEPCID) tablet 20 mg  20 mg Oral BID PRN Tasha Vivar MD        fluticasone (FLONASE) 50 MCG/ACT spray 1-2 spray  1-2 spray Nasal Daily PRN Tasha Vivar MD        HYDROmorphone (DILAUDID) injection 0.2 mg  0.2 mg Intravenous Q8H PRN Nelsy Tee, RANJAN CNP        lidocaine (LMX4) cream   Topical Q1H PRN Socorro Aceves MD        lidocaine (XYLOCAINE) 5 % ointment   Topical TID PRN Nelsy Tee, RANJAN CNP   Given at 06/21/24 0101    lidocaine 1 % 0.1-1 mL  0.1-1 mL Other Q1H PRN Socorro Aceves MD        loratadine (CLARITIN) tablet 10 mg  10 mg Oral Daily PRN Tasha Vivar MD        naloxone (NARCAN) injection 0.2 mg  0.2 mg Intravenous Q2 Min PRN Socorro Aceves MD        Or    naloxone (NARCAN) injection 0.4 mg  0.4 mg Intravenous Q2 Min PRN Socorro Aceves MD        Or    naloxone (NARCAN) injection 0.2 mg  0.2 mg Intramuscular Q2 Min PRN Socorro Aceves MD        Or    naloxone (NARCAN) injection 0.4 mg  0.4 mg Intramuscular Q2 Min PRN Socorro Aceves MD        nortriptyline (PAMELOR) capsule 20 mg  20 mg Oral At Bedtime PRN Tasha Vivar MD        ondansetron (ZOFRAN ODT) ODT tab 4 mg  4 mg Oral Q6H PRN Socorro Aceves MD        Or    ondansetron (ZOFRAN) injection 4 mg  4 mg Intravenous Q6H PRN Socorro Aceves MD        oxyCODONE IR (ROXICODONE) half-tab 2.5 mg  2.5 mg Oral Q4H PRN Lida Bowers PA-C   2.5 mg at 06/18/24 1328    Or    oxyCODONE (ROXICODONE) tablet 5 mg  5 mg Oral Q4H PRN Lida Bowers PA-C   5 mg at 06/21/24 0930    senna-docusate (SENOKOT-S/PERICOLACE) 8.6-50 MG per tablet 1 tablet  1 tablet Oral BID PRN Socorro Aceves MD        Or    senna-docusate (SENOKOT-S/PERICOLACE) 8.6-50 MG per tablet  2 tablet  2 tablet Oral BID PRN Socorro Aceves MD        sodium chloride (PF) 0.9% PF flush 3 mL  3 mL Intracatheter q1 min prn Socorro Aceves MD   3 mL at 06/18/24 1494            Data:      All new lab and imaging data was reviewed.

## 2024-06-21 NOTE — PROGRESS NOTES
Shift Summary [id number]    Admitting Diagnosis: Back pain   Vitals VSS  Pain 5/10. Taking PRN dilaudid and tylenol PRN. Last dose 1254  A&Ox4  Voiding Yes  Mobility Assist of 1 GB/W  Tele: N/A  CMS Intact  Lung Sounds Clear on ascultation  via N/A  GI Active bowel sounds.  Dressing CDI    Orders Placed This Encounter      Regular Diet Adult       Plan:

## 2024-06-21 NOTE — PROGRESS NOTES
Patient does have coverage for iv abx through their [employer] [facility] [employer] [facility] Medicare Advantage plan, however the drug must be on a CADD pump for coverage. If not on a CADD pump, then the patient would be self-pay due to no coverage. Patient has an 80/20 coverage until they have met their out of pocket of $2900 (patient has met $1010.54 at this time).   Based on Vanco, it can go on a CADD so patient should be covered.     For nursing, patient should have coverage if homebound, however [facility] is not contracted with Medicare and an outside nursing agency would be utilized instead. If patient is not homebound, there is no coverage and [facility] can see patient if patient agrees to self-pay for $90 per visit.    Patient should have coverage in a TCU or infusion center.    (FVSD) In reference to admission date 06/18/2024.    Please contact Intake with any questions, [phone] or In Basket pool,  Home Infusion ([phone]).

## 2024-06-21 NOTE — PLAN OF CARE
Goal Outcome Evaluation:    Shift Summary: 6/20/24, [id number]     Admitting Diagnosis: Back pain   Vitals: Stable on RA  Denies pain. Taking scheduled gabapentin & robaxin  A&Ox4  Voiding: Continent; Uses bedside urinal  Mobility: Ax1 GB & walker  Tele: N/A  CMS: Baseline neuropathy on BLE  Lung Sounds clear   GI: Continent  Dressing: Incision on the back from biopsy is CDI   PIV SL w/ intermittent IV abx  BS checks     Orders Placed This Encounter      Combination Diet Regular Diet Adult        Plan: ID following. Pending culture.

## 2024-06-21 NOTE — PROGRESS NOTES
06/21/24 0900   Appointment Info   Signing Clinician's Name / Credentials (PT) Ruma Beckford DPT   Living Environment   People in Home alone   Current Living Arrangements assisted living   Home Accessibility no concerns   Number of Stairs, Within Home, Primary none   Living Environment Comments Pt recently at a TCU for ~4 wks, moved into [facility] where he was for 7 days prior to hospital admission   Self-Care   Usual Activity Tolerance fair   Current Activity Tolerance poor   Equipment Currently Used at Home walker, rolling;shower chair;grab bar, toilet;grab bar, tub/shower;wheelchair, manual   Fall history within last six months yes   Number of times patient has fallen within last six months   (2)   Activity/Exercise/Self-Care Comment Pt has assist WC ride to meals, cleaning, laundry. Has assist for bathing.  Rmao in his unit with walker.   General Information   Onset of Illness/Injury or Date of Surgery 06/18/24   Referring Physician Tasha Vivar MD   Patient/Family Therapy Goals Statement (PT) pt preference is to return to [facility]   Existing Precautions/Restrictions fall;spinal   Cognition   Affect/Mental Status (Cognition) WFL;anxious   Orientation Status (Cognition) oriented x 3   Follows Commands (Cognition) WFL   Pain Assessment   Patient Currently in Pain Yes, see Vital Sign flowsheet  (3/10 at rest, up to 8/10 with mobility)   Posture    Posture Forward head position;Protracted shoulders   Range of Motion (ROM)   Range of Motion ROM deficits secondary to pain   ROM Comment trunk ROM and LLE ROM limited due to pain, all other extremities WFL   Strength (Manual Muscle Testing)   Strength (Manual Muscle Testing) Deficits observed during functional mobility   Strength Comments decreased functional LE strength, L>R   Bed Mobility   Bed Mobility supine-sit   Comment, (Bed Mobility) modA via logroll   Transfers   Transfers sit-stand transfer   Comment, (Transfers) Juan Jose of 2 with FWW   Gait/Stairs (Locomotion)    Comment, (Gait/Stairs) Juan Jose of 2 with FWW for 3 ft of gait, decreased L side WBing due to pain   Balance   Balance Comments falls risk, currently requires FWW and physical assist for safe upright mobility   Sensory Examination   Sensory Perception Comments baseline neuropathy in B feet   Clinical Impression   Criteria for Skilled Therapeutic Intervention Yes, treatment indicated   PT Diagnosis (PT) impaired IND with functional mobility   Influenced by the following impairments impaired functional strength, ROM, balance, activity tolerance, pain   Functional limitations due to impairments impaired bed mobility, transfers, ambulation   Clinical Presentation (PT Evaluation Complexity) stable   Clinical Presentation Rationale Based on current presentation, PMH, social support   Clinical Decision Making (Complexity) moderate complexity   Planned Therapy Interventions (PT) gait training;home exercise program;patient/family education;strengthening;transfer training;balance training;bed mobility training;ROM (range of motion);progressive activity/exercise;home program guidelines   Risk & Benefits of therapy have been explained evaluation/treatment results reviewed;care plan/treatment goals reviewed;risks/benefits reviewed;current/potential barriers reviewed;participants voiced agreement with care plan;participants included;patient   PT Total Evaluation Time   PT Eval, Moderate Complexity Minutes (20054) 10   Physical Therapy Goals   PT Frequency 5x/week   PT Predicted Duration/Target Date for Goal Attainment 06/28/24   PT Goals Bed Mobility;Transfers;Gait   PT: Bed Mobility Supine to/from sit;Supervision/stand-by assist;Within precautions   PT: Transfers Modified independent;Bed to/from chair;Sit to/from stand;Assistive device;Within precautions   PT: Gait Modified independent;Assistive device;Rolling walker;50 feet   Therapeutic Activity   Therapeutic Activities: dynamic activities to improve functional performance  Minutes (91288) 15   Symptoms Noted During/After Treatment Increased pain;Fatigue   Treatment Detail/Skilled Intervention Pt edu on spinal precautions, discussed benefits of these in relation to high levels of back pain. Discussed proper body mechanics and use of logroll for bed mobility. Pt anxious about mobility, reports he had a near fall earlier today, requesting Ax2 for mobility. Following eval, completed further sit<>Stands with Juan Jose of 2 at FWW. In initial standing, pt needing cues each time for full upright posture. ModA for sit>sup, cued for logroll but pt completes partially. Calling out in pain with transition to supine. MaxA of 2 to boost up in bed. Pillows placed for comfort. Increased time spent discussing PT discharge recs, pt preference is to return home. Discussed concerns with this and benefits of rehab as pt is currently requring Ax2. Pt remained supine with alarm armed and needs in reach.   Gait Training   Gait Training Minutes (85791) 8   Symptoms Noted During/After Treatment (Gait Training) increased pain;fatigue   Treatment Detail/Skilled Intervention Pre-gait standing march with use of FWW and Juan Jose. Pt cued for gait with FWW, Juan Jose of 2. Pt with heavy UE reliance on FWW, decreased L sided WBing. Cued for upright posture, and safe navigation of environment. Fatigued and painful with short distance gait. Takes seated break between bouts. Declines to ambulate further distance on second bout.   Distance in Feet 4' x2   PT Discharge Planning   PT Plan review logroll, sit<>stand reps, progress gait as able with Ax2   PT Discharge Recommendation (DC Rec) Transitional Care Facility   PT Rationale for DC Rec Pt currently requires Ax2 for limited mobility (4' of gait with FWW). Lives at [facility] where he can typically ambulate Annabella with FWW in his room. Pt would benefit from continued skilled PT at TCU to maximize safety and IND with mobility prior to returning home. To safely return to [facility], would require  Ax2 for limited mobility with use of FWW, use of WC for any significant distance of gait.   PT Brief overview of current status Ax2 with FWW for bed<>chair   Total Session Time   Timed Code Treatment Minutes 23   Total Session Time (sum of timed and untimed services) 33

## 2024-06-21 NOTE — CONSULTS
Care Management Initial Consult    General Information  Assessment completed with: Patient, Patient and son  Type of CM/SW Visit: Initial Assessment    Primary Care Provider verified and updated as needed: Yes   Readmission within the last 30 days: no previous admission in last 30 days      Reason for Consult: discharge planning  Advance Care Planning: Advance Care Planning Reviewed: present on chart          Communication Assessment  Patient's communication style: spoken language (English or Bilingual)    Hearing Difficulty or Deaf: no   Wear Glasses or Blind: yes    Cognitive  Cognitive/Neuro/Behavioral: WDL                      Living Environment:   People in home: alone     Current living Arrangements: assisted living  Name of Facility: [facility]   Able to return to prior arrangements:  (TBD)  Living Arrangement Comments: Patient lives alone at [facility].  Patient receives assistance with cleaning and laundry.  Patient receives cares from a private duty home health aide with [facility].    Family/Social Support:  Care provided by: self  Provides care for: no one  Marital Status:   Children          Description of Support System: Involved, Supportive    Support Assessment: Adequate family and caregiver support    Current Resources:   Patient receiving home care services: Yes  Skilled Home Care Services: Home Health Aid  Community Resources: Home Care  Equipment currently used at home: walker, rolling, shower chair, grab bar, toilet, grab bar, tub/shower, wheelchair, manual, cane, straight, raised toilet seat  Supplies currently used at home:      Employment/Financial:  Employment Status: retired        Financial Concerns: none   Referral to Financial Worker: No  Finance Comments: Patient has BCBS Medicare advantage.  Writer made him aware of the discharge process including this insurance authorization.  Writer informed the patient that this type of plan has SNF benefits, however, they would need  to get approved first.    Does the patient's insurance plan have a 3 day qualifying hospital stay waiver?  Yes     Which insurance plan 3 day waiver is available? Alternative insurance waiver    Will the waiver be used for post-acute placement? Undetermined at this time    Lifestyle & Psychosocial Needs:  Social Determinants of Health     Food Insecurity: Not on file   Depression: Not at risk (2/27/2024)    PHQ-2     PHQ-2 Score: 0   Housing Stability: Not on file   Tobacco Use: Medium Risk (6/17/2024)    Patient History     Smoking Tobacco Use: Former     Smokeless Tobacco Use: Never     Passive Exposure: Not on file   Financial Resource Strain: Not on file   Alcohol Use: Not on file   Transportation Needs: Not on file   Physical Activity: Not on file   Interpersonal Safety: Low Risk  (3/27/2024)    Interpersonal Safety     Do you feel physically and emotionally safe where you currently live?: Yes     Within the past 12 months, have you been hit, slapped, kicked or otherwise physically hurt by someone?: No     Within the past 12 months, have you been humiliated or emotionally abused in other ways by your partner or ex-partner?: No   Stress: Not on file   Social Connections: Not on file   Health Literacy: Not on file       Functional Status:  Prior to admission patient needed assistance:   Dependent ADLs:: Ambulation-walker, Independent  Dependent IADLs:: Laundry, Cleaning, Independent       Mental Health Status:  Mental Health Status: No Current Concerns       Chemical Dependency Status:  Chemical Dependency Status: No Current Concerns             Values/Beliefs:  Spiritual, Cultural Beliefs, Taoism Practices, Values that affect care: yes  Description of Beliefs that Will Affect Care: karin            Additional Information:  CM initial assessment    Per H&P, Lance MORALES Patrice is a 79 year old male with a past medical history of heart failure with preserved ejection fraction, hypertension, dyslipidemia, CKD, gout,  BPH, type 2 diabetes, presents to the hospital with acute on chronic back pain.  The patient reports that his symptoms started approximately 6 days ago with worsening of his back pain.  He reports the pain is located on the right side of his back.  His pain progressively got worse so he went to his pain clinic where he received a trigger point injection.  The following day his pain became significantly worse.  The patient tried multiple medications including NSAIDs and lidocaine with only temporary improvement of symptoms.  Due to ongoing symptoms he decided to present to the [facility] emergency room.  In the emergency room he underwent imaging with a CT scan of the back which was concerning for possible discitis for which the patient's case was discussed with neurosurgery and the patient is being transferred to [facility] for MRI and neurosurgical evaluation.    Care management was consulted for discharge planning/disposition.  Writer met with patient to complete consult.  Per therapies, rec is TCU.    Writer followed up with the patient.  Patient stated that he has been recently at [facility], back to home, admitted again at [facility], and lastly transferred over to [facility].  Patient stated that he was tired of being transported around and would like to go home.  Patient stated in the past he has worked with a private home health aide with [facility] and would be willing to do the same for this instance if the only reason for his TCU stay would be due to the IV antibiotic plan.  Writer clarified with the patient that physical therapy was the one that recommended TCU due to physical weakness.  Patient stated that he was okay going to a TCU for PT, but would want to go home and not stick around just because of the IV antibiotic plan.    Writer and patient aware that the IV antibiotic plan is not finalized as of yet.  Writer stated that most likely he would still need TCU therefore  he would inform him of his benefits through [facility] Medicare advantage.  Writer informed him of what is covered and not covered (private room and potentially EMS transportation).  Writer left a packet for the patient to look over.  Patient requested the writer to contact his son for updates.    Writer contacted the patient's son, Omari ([phone]).  Omari agreed that they would like the patient to return home if necessary since he would be able to privately pay for any nurse needed to handle the antibiotic plan, but if PT is needed they are okay with the patient going to a TCU.    SW to continue following for discharge planning.    ANT Varela  [facility]  Social Work

## 2024-06-21 NOTE — PROGRESS NOTES
[facility]    Infectious Disease Progress Note    Date of Service (when I saw the patient): 06/21/2024     Assessment & Plan   Lance Ibrahim is a 79 year old male who was admitted on 6/18/2024.     Impression:  80 yo with PMH of  heart failure with preserved ejection fraction, hypertension, dyslipidemia, CKD, gout, BPH, type 2 diabetes and chronic low back pain for which he is folllowed by [employer] and has an implantable spine stimulator ([employer], MRI conditional 4/2024) in place.   Presented to [facility] with acute on chronic, intractable pain with RLE radiculopathy.   CT was concerning for discitis-osteomyelitis at L4-5 with epidural/intraforaminal phlegmon vs abscess at L4-S1 with severe foraminal narrowing. Transferred to [facility] for Neurosurgery evaluation and ID consultation.   Of note, recent history of urosepsis with klebsiella in the blood and urine treated with antibiotics   MRI with discitis osteomyelitis at L4-5, epidural abscess in the ventral epidural space, craniocaudal extension of 5 cm (from inferior L4 through inferior S1 level) and severe spinal stenosis at L4-5 from abscess.  S/p L4-5 disc aspiration and L4 biopsy 6/19 with cultures no growth to date, gram stain with 3+ WBC.  Blood cultures no growth to date.   Allergies to PCN (anaphylaxis), Cephalexin (rash - required hospitalization), Cefazolin (rash), and Sulfa (swelling of face and hands, itching)     Recommendations:  Continue IV Vancomycin for now.  Following aspiration cultures.   Anticipate at least 6 weeks of IV antibiotics. Order for 6 weeks IV Vancomycin placed in discharge navigator (until 8/1/24)- may need to adjust if cultures return positive. However, his allergies limit choices.   Place PICC for IV antibiotics at discharge.   Noted Neurosurgery notes - they will get repeat MRI in 6 weeks.   Will arrange to have patient follow-up with ID in 2 weeks.       Patient and plan discussed with Dr. Danielle.      Tsering Soresnon PA-C        Interval History   Afebrile.   Labs reviewed   No changes to past medical, social or family history   Back pain under control when not moving, but awful with any movement. Awaiting aspiration results.       Physical Exam   Temp: 97.5  F (36.4  C) Temp src: Oral BP: 127/76 Pulse: 93   Resp: 16 SpO2: 98 % O2 Device: None (Room air)    There were no vitals filed for this visit.  Vital Signs with Ranges  Temp:  [97.5  F (36.4  C)-99.1  F (37.3  C)] 97.5  F (36.4  C)  Pulse:  [] 93  Resp:  [16-18] 16  BP: (124-141)/(69-79) 127/76  SpO2:  [95 %-100 %] 98 %    Constitutional: Awake, alert, cooperative, no apparent distress  Lungs: Clear to auscultation bilaterally, no crackles or wheezing  Cardiovascular: Regular rate and rhythm, normal S1 and S2, and no murmur noted  Abdomen: Normal bowel sounds, soft, non-distended, non-tender  Skin: No rashes, no cyanosis, no edema  Other:    Medications   Current Facility-Administered Medications   Medication Dose Route Frequency Provider Last Rate Last Admin     Current Facility-Administered Medications   Medication Dose Route Frequency Provider Last Rate Last Admin    acetaminophen (TYLENOL) tablet 650 mg  650 mg Oral Q6H Nelsy Tee APRN CNP   650 mg at 06/21/24 0558    colchicine (COLCRYS) tablet 0.6 mg  0.6 mg Oral Daily Tasha Vivar MD   0.6 mg at 06/20/24 1753    diclofenac (VOLTAREN) 1 % topical gel 2 g  2 g Topical 4x Daily Nelsy Tee APRN CNP   2 g at 06/20/24 2102    ferrous gluconate (FERGON) tablet 324 mg  324 mg Oral Daily Tasha Vivar MD   324 mg at 06/20/24 1553    gabapentin (NEURONTIN) capsule 100 mg  100 mg Oral TID Tasha Vivar MD   100 mg at 06/20/24 2101    glipiZIDE (GLUCOTROL XL) 24 hr tablet 2.5 mg  2.5 mg Oral Daily with breakfast Tasha Vivar MD        insulin aspart (NovoLOG) injection (RAPID ACTING)  1-3 Units Subcutaneous TID Socorro Silverman MD   1 Units at 06/20/24 1278     insulin aspart (NovoLOG) injection (RAPID ACTING)  1-3 Units Subcutaneous At Bedtime Socorro Aceves MD   1 Units at 06/20/24 2154    insulin glargine (LANTUS PEN) injection 6 Units  6 Units Subcutaneous BID Tasha Vivar MD   6 Units at 06/20/24 2154    lactobacillus rhamnosus (GG) (CULTURELL) capsule 2 capsule  2 capsule Oral Daily Tasha Vivar MD   2 capsule at 06/20/24 1553    lisinopril (ZESTRIL) tablet 10 mg  10 mg Oral Daily Tasha Vivar MD   10 mg at 06/20/24 1553    methocarbamol (ROBAXIN) half-tab 250 mg  250 mg Oral TID Nelsy Tee APRN CNP   250 mg at 06/20/24 2101    sodium bicarbonate tablet 1,950 mg  1,950 mg Oral TID Tasha Vivar MD   1,950 mg at 06/20/24 2056    sodium chloride (PF) 0.9% PF flush 3 mL  3 mL Intracatheter Q8H Socorro Aceves MD   3 mL at 06/20/24 2057    ursodiol (ACTIGALL) capsule 300 mg  300 mg Oral BID Tasha Vivar MD   300 mg at 06/20/24 2056    vancomycin (VANCOCIN) 1,500 mg in 0.9% NaCl 250 mL intermittent infusion  1,500 mg Intravenous Q24H Tasha Vivar  mL/hr at 06/19/24 1834 1,500 mg at 06/20/24 1753       Data   All microbiology laboratory data reviewed.  Recent Labs   Lab Test 06/20/24  0829 06/19/24  0710 06/18/24  0845 06/17/24  1758   WBC  --  7.8 8.2 10.2   HGB 10.9* 9.7* 9.7* 10.2*   HCT  --  30.8* 31.4* 32.6*   MCV  --  93 94 90   PLT  --  289 284 348     Recent Labs   Lab Test 06/20/24  0829 06/19/24  0710 06/18/24  0845   CR 1.17 1.16 1.25*     Recent Labs   Lab Test 06/18/24  1029   SED 69*     CRP Inflammation   Date Value Ref Range Status   06/18/2024 10.61 (H) <5.00 mg/L Final               06/19/2024 1548 06/21/2024 1119 Biopsy Aerobic Bacterial Culture Routine With Gram Stain [[id number]]   Biopsy from Spine, Lumbar    Preliminary result Component Value   Culture No growth after 1 day P   Gram Stain Result No organisms seen P    3+ WBC seen P    Predominantly PMNs             06/19/2024 154  06/20/2024 1946 Anaerobic Bacterial Culture Routine [[id number]]   Aspirate from Spine, Lumbar    Preliminary result Component Value   Culture No anaerobic organisms isolated after 1 day P             06/19/2024 1548 06/21/2024 1118 Aspirate Aerobic Bacterial Culture Routine With Gram Stain [[id number]]   Aspirate from Spine, Lumbar    Preliminary result Component Value   Culture No growth after 1 day P   Gram Stain Result No organisms seen P    3+ WBC seen P    Predominantly PMNs             06/19/2024 1541 06/20/2024 2007 Anaerobic Bacterial Culture Routine [[id number]]    Biopsy from Spine, Lumbar    Preliminary result Component Value   Culture No anaerobic organisms isolated after 1 day P             06/18/2024 1038 06/21/2024 1332 Blood Culture Arm, Right [[id number]]   Blood from Arm, Right    Preliminary result Component Value   Culture No growth after 3 days P             06/18/2024 1029 06/21/2024 1332 Blood Culture Arm, Left [[id number]]   Blood from Arm, Left    Preliminary result Component Value   Culture No growth after 3 days P             06/17/2024 1612 06/19/2024 0547 Urine Culture Aerobic Bacterial [[id number]]   Urine, Clean Catch    Final result Component Value   Culture <10,000 CFU/mL Mixture of Urogenital Joyce                 EXAM: MR LUMBAR SPINE W/O and W CONTRAST  LOCATION: [facility]  DATE/TIME: 6/18/2024 7:52 PM CDT    INDICATION: Urosepsis, abnormal CT.  COMPARISON: CT 6/17/2024.  TECHNIQUE: Routine lumbar spine MRI without and with IV contrast.    FINDINGS:  Nomenclature: Nomenclature based on 5 lumbar type vertebral bodies.  Infection changes: Significant abnormal bone marrow edema L4 and L5 vertebral bodies, evidence of fluid in the L4-L5 disc space with significant enhancement of the vertebral bodies. Epidural abscess in the left paracentral epidural space measures 6 x 6  mm in the axial plane and 5 cm craniocaudally posterior to L4-S1  vertebrae.  Alignment: Minor retrolisthesis L3-L4, L4-L5 and L5-S1.  Vertebrae: Normal vertebral body heights.  Marrow signal: Marked bone marrow edema L4 and L5 vertebral bodies.  Spinal cord: Normal distal spinal cord and cauda equina with conus medullaris at L1.  Extraspinal: No extra spinal abnormalities.  Pelvis: Unremarkable visualized bony pelvis.    T12-L1: Normal height of disc. Mild disc desiccation without herniation. Normal facet joints. No recess stenosis. No central spinal stenosis, no right foramen stenosis, no left foramen stenosis.    L1-L2: Normal height of disc. Mild disc desiccation without herniation. Normal facet joints. No recess stenosis. No central spinal stenosis, no right foramen stenosis, no left foramen stenosis.    L2-L3: Normal height of disc. Mild disc desiccation without herniation. Mild bilateral facet hypertrophy. No recess stenosis. No central spinal stenosis, no right foramen stenosis, no left foramen stenosis.    L3-L4: Normal height of disc. Desiccated disc with shallow disc bulge. Moderate bilateral facet hypertrophy. No recess stenosis. No central spinal stenosis, no right foramen stenosis, no left foramen stenosis.    L4-L5: Marked loss of height. Fluid signal with prominent disc bulge. Moderate bilateral facet hypertrophy. Epidural abscess. Severe recess stenosis. Severe central spinal stenosis, no right foramen stenosis, no left foramen stenosis.    L5-S1: Moderate loss of disc height. Degenerated disc with moderate left subarticular protrusion. Moderate bilateral facet hypertrophy. Moderate bilateral recess stenosis. Moderate central spinal stenosis, severe right foramen stenosis, mild left foramen   stenosis.   Impression:     IMPRESSION:  1.  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  2.  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  3.  At L4-5, severe spinal stenosis secondary to the epidural  abscess.  4.  At L5-S1, moderate spinal stenosis secondary to the epidural abscess.

## 2024-06-22 LAB
CREAT SERPL-MCNC: 1.26 MG/DL (ref 0.67–1.17)
EGFRCR SERPLBLD CKD-EPI 2021: 58 ML/MIN/1.73M2
GLUCOSE BLDC GLUCOMTR-MCNC: 103 MG/DL (ref 70–99)
GLUCOSE BLDC GLUCOMTR-MCNC: 133 MG/DL (ref 70–99)
GLUCOSE BLDC GLUCOMTR-MCNC: 143 MG/DL (ref 70–99)
GLUCOSE BLDC GLUCOMTR-MCNC: 222 MG/DL (ref 70–99)
GLUCOSE BLDC GLUCOMTR-MCNC: 265 MG/DL (ref 70–99)
VANCOMYCIN SERPL-MCNC: 17.7 UG/ML

## 2024-06-22 PROCEDURE — 250N000013 HC RX MED GY IP 250 OP 250 PS 637: Performed by: HOSPITALIST

## 2024-06-22 PROCEDURE — 250N000013 HC RX MED GY IP 250 OP 250 PS 637: Performed by: PHYSICIAN ASSISTANT

## 2024-06-22 PROCEDURE — 250N000011 HC RX IP 250 OP 636: Performed by: HOSPITALIST

## 2024-06-22 PROCEDURE — 82565 ASSAY OF CREATININE: CPT | Performed by: HOSPITALIST

## 2024-06-22 PROCEDURE — 80202 ASSAY OF VANCOMYCIN: CPT | Performed by: HOSPITALIST

## 2024-06-22 PROCEDURE — 36415 COLL VENOUS BLD VENIPUNCTURE: CPT | Performed by: HOSPITALIST

## 2024-06-22 PROCEDURE — 99232 SBSQ HOSP IP/OBS MODERATE 35: CPT | Performed by: HOSPITALIST

## 2024-06-22 PROCEDURE — 250N000013 HC RX MED GY IP 250 OP 250 PS 637

## 2024-06-22 PROCEDURE — 258N000003 HC RX IP 258 OP 636: Mod: JZ | Performed by: HOSPITALIST

## 2024-06-22 PROCEDURE — 120N000001 HC R&B MED SURG/OB

## 2024-06-22 RX ADMIN — ACETAMINOPHEN 650 MG: 325 TABLET, FILM COATED ORAL at 15:17

## 2024-06-22 RX ADMIN — LIDOCAINE: 50 OINTMENT TOPICAL at 18:07

## 2024-06-22 RX ADMIN — GABAPENTIN 100 MG: 100 CAPSULE ORAL at 08:36

## 2024-06-22 RX ADMIN — METHOCARBAMOL 250 MG: 500 TABLET ORAL at 15:16

## 2024-06-22 RX ADMIN — COLCHICINE 0.6 MG: 0.6 TABLET ORAL at 08:36

## 2024-06-22 RX ADMIN — SODIUM BICARBONATE 650 MG TABLET 1950 MG: at 11:53

## 2024-06-22 RX ADMIN — ASPIRIN 81 MG: 81 TABLET, COATED ORAL at 08:36

## 2024-06-22 RX ADMIN — URSODIOL 300 MG: 300 CAPSULE ORAL at 19:46

## 2024-06-22 RX ADMIN — METHOCARBAMOL 250 MG: 500 TABLET ORAL at 21:36

## 2024-06-22 RX ADMIN — DICLOFENAC 2 G: 10 GEL TOPICAL at 18:07

## 2024-06-22 RX ADMIN — LISINOPRIL 10 MG: 10 TABLET ORAL at 08:36

## 2024-06-22 RX ADMIN — GABAPENTIN 100 MG: 100 CAPSULE ORAL at 15:17

## 2024-06-22 RX ADMIN — ACETAMINOPHEN 650 MG: 325 TABLET, FILM COATED ORAL at 08:36

## 2024-06-22 RX ADMIN — FERROUS GLUCONATE 324 MG: 324 TABLET ORAL at 08:36

## 2024-06-22 RX ADMIN — INSULIN ASPART 2 UNITS: 100 INJECTION, SOLUTION INTRAVENOUS; SUBCUTANEOUS at 11:54

## 2024-06-22 RX ADMIN — SODIUM BICARBONATE 650 MG TABLET 1950 MG: at 19:47

## 2024-06-22 RX ADMIN — VANCOMYCIN HYDROCHLORIDE 1500 MG: 10 INJECTION, POWDER, LYOPHILIZED, FOR SOLUTION INTRAVENOUS at 18:07

## 2024-06-22 RX ADMIN — OXYCODONE HYDROCHLORIDE 5 MG: 5 TABLET ORAL at 18:06

## 2024-06-22 RX ADMIN — Medication 2 CAPSULE: at 11:54

## 2024-06-22 RX ADMIN — OXYCODONE HYDROCHLORIDE 5 MG: 5 TABLET ORAL at 21:36

## 2024-06-22 RX ADMIN — GABAPENTIN 100 MG: 100 CAPSULE ORAL at 21:35

## 2024-06-22 RX ADMIN — INSULIN ASPART 1 UNITS: 100 INJECTION, SOLUTION INTRAVENOUS; SUBCUTANEOUS at 08:39

## 2024-06-22 RX ADMIN — METHOCARBAMOL 250 MG: 500 TABLET ORAL at 08:36

## 2024-06-22 RX ADMIN — ACETAMINOPHEN 650 MG: 325 TABLET, FILM COATED ORAL at 19:46

## 2024-06-22 RX ADMIN — DICLOFENAC 2 G: 10 GEL TOPICAL at 08:37

## 2024-06-22 RX ADMIN — SODIUM BICARBONATE 650 MG TABLET 1950 MG: at 08:37

## 2024-06-22 RX ADMIN — URSODIOL 300 MG: 300 CAPSULE ORAL at 08:36

## 2024-06-22 RX ADMIN — GLIPIZIDE 2.5 MG: 2.5 TABLET, FILM COATED, EXTENDED RELEASE ORAL at 08:36

## 2024-06-22 RX ADMIN — DICLOFENAC 2 G: 10 GEL TOPICAL at 21:37

## 2024-06-22 ASSESSMENT — ACTIVITIES OF DAILY LIVING (ADL)
ADLS_ACUITY_SCORE: 42
ADLS_ACUITY_SCORE: 41
ADLS_ACUITY_SCORE: 42
ADLS_ACUITY_SCORE: 42
ADLS_ACUITY_SCORE: 39
ADLS_ACUITY_SCORE: 41
ADLS_ACUITY_SCORE: 39
ADLS_ACUITY_SCORE: 41
ADLS_ACUITY_SCORE: 41
ADLS_ACUITY_SCORE: 39
ADLS_ACUITY_SCORE: 41
ADLS_ACUITY_SCORE: 39
ADLS_ACUITY_SCORE: 40
ADLS_ACUITY_SCORE: 39
ADLS_ACUITY_SCORE: 39
ADLS_ACUITY_SCORE: 41
ADLS_ACUITY_SCORE: 39
ADLS_ACUITY_SCORE: 41
ADLS_ACUITY_SCORE: 41
ADLS_ACUITY_SCORE: 40
ADLS_ACUITY_SCORE: 40

## 2024-06-22 NOTE — PHARMACY-VANCOMYCIN DOSING SERVICE
Pharmacy Vancomycin Note  Date of Service 2024  Patient's  1945   79 year old, male    Indication: Abscess and Osteomyelitis of spine  Day of Therapy: 4  Current vancomycin regimen:  1500 mg IV q24h  Current vancomycin monitoring method: AUC but possibly should be via trough per neuro indication  Current vancomycin therapeutic monitoring goal: 400-600 mg*h/L    InsightRX Prediction of Current Vancomycin Regimen  Loading dose: N/A  Regimen: 1500 mg IV every 24 hours.  Start time: 18:04 on 2024  Exposure target: AUC24 (range)400-600 mg/L.hr   AUC24,ss: 550 mg/L.hr  Probability of AUC24 > 400: 98 %  Ctrough,ss: 16.9 mg/L  Probability of Ctrough,ss > 20: 25 %  Probability of nephrotoxicity (Lodise LATISHA ): 13 %      Current estimated CrCl = Estimated Creatinine Clearance: 58.3 mL/min (A) (based on SCr of 1.24 mg/dL (H)).    Creatinine for last 3 days  2024:  8:29 AM Creatinine 1.17 mg/dL  2024:  9:05 AM Creatinine 1.24 mg/dL    Recent Vancomycin Levels (past 3 days)  2024:  8:51 AM Vancomycin 17.7 ug/mL    Vancomycin IV Administrations (past 72 hours)                     vancomycin (VANCOCIN) 1,500 mg in 0.9% NaCl 250 mL intermittent infusion (mg) 1,500 mg New Bag 24 1804     1,500 mg New Bag 24 1753     1,500 mg New Bag 24 1834                    Nephrotoxins and other renal medications (From now, onward)      Start     Dose/Rate Route Frequency Ordered Stop    24 0000  vancomycin (VANCOCIN) 1500 mg/250 mL IVPB         1,500 mg Intravenous EVERY 24 HOURS 24 1557 24 2359    24 1500  lisinopril (ZESTRIL) tablet 10 mg        Note to Pharmacy: RONIT Sig:Take 1 tablet (10 mg) by mouth every 24 hours      10 mg Oral DAILY 24 1420      24 1730  vancomycin (VANCOCIN) 1,500 mg in 0.9% NaCl 250 mL intermittent infusion         1,500 mg  over 90 Minutes Intravenous EVERY 24 HOURS 24 1708                 Contrast Orders - past 72  hours (72h ago, onward)      None            Interpretation of levels and current regimen:  Vancomycin level is reflective of -600    Has serum creatinine changed greater than 50% in last 72 hours: No    Urine output:  unable to determine    Renal Function: Stable    Plan:  Continue Current Dose  Vancomycin monitoring method:  Will set a up a trough level for Sunday's dose to confirm correct dosing with AUC method.    Vancomycin therapeutic monitoring goal: 400-600 mg*h/L  Pharmacy will check vancomycin levels as appropriate in 1-3 Days.  Serum creatinine levels will be ordered daily for the first week of therapy and at least twice weekly for subsequent weeks.    Hina De Jesus, [facility]

## 2024-06-22 NOTE — PLAN OF CARE
Goal Outcome Evaluation:  Shift Summary [id number]    Admitting Diagnosis: Back pain   Vitals WDL ex hypertensive at times  Taking PRN Oxy, scheduled Robaxin and Tylenol for pain  A&Ox4  Voiding urinal at bedside  Mobility A of 2 GB/W  CMS Baseline numbness BLE  GI No BM on shift  Dressing Mepilex on coccyx  PIV SL, Vanco Q24    Orders Placed This Encounter      Regular Diet Adult       Plan: TCU rec. Monitoring aspiration cultures.

## 2024-06-22 NOTE — PLAN OF CARE
Goal Outcome Evaluation:    .Date/Time 6/22/24 [id number]    Trauma/Ortho/Medical: Medical    Diagnosis: Discitis/Osteomyelitis L4-L5 with epidural abscess  POD#: N/A IR disc aspiration.   Mental Status: A&Ox4  Activity/dangle: Up A1-2 gb and walker to recliner and BSC. 1 BM today.   Diet: Regular, good appetite.   Pain: Managed with PRN oxycodone, scheduled robaxin and tylenol. Requesting PRN lidocaine patches.   Barnes/Voiding: Voiding spontaneously in urinal.   Tele/Restraints/Iso: N/A  02/LDA: PIV running vancomycin now.   D/C Date: TBD, likely TCU pending placemnet.   Other Info: ID following, cultures still pending. Will need long term-IV placed prior to discharge.

## 2024-06-22 NOTE — PROGRESS NOTES
[facility]  Hospitalist Progress Note   06/22/2024          Assessment and Plan:       Lance Ibrahim is a 79 year old male with history of heart failure with preserved ejection fraction, hypertension, dyslipidemia, CKD, gout, BPH, type 2 diabetes and chronic low back pain for which he is folllowed by [facility] and has an implantable spine stimulator ([employer], MRI conditional 4/2024) in place. He initially presented to [facility] with acute on chronic, intractable pain with RLE radiculopathy. CT was concerning for discitis-osteomyelitis at L4-5 with epidural/intraforaminal phlegmon vs abscess at L4-S1 with severe foraminal narrowing. Transferred to [facility] for Neurosurgery evaluation and ID consultation.     Status post IR guided L4-L5 disc aspiration and L4 biopsy.  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  Epidural abscess.  Severe spinal stenosis.  Acute on chronic back pain with RLE radiculopathy  --Presented with acute on chronic back pain located on the right side of his back.  Significant tenderness to palpation at approximately the L3-L4 level on the right side with associated RLE radiculopathy. No bladder or bowel incontinence, no saddle anesthesia. Uses a walker for shorter distances and wheelchair for longer distances at baseline. Significant pain with weight bearing. No recent trauma or falls, but did get a trigger point injection by [facility] clinic on 6/14. Also note recent hospitalization for severe sepsis secondary to Klebsiella UTI for which patient completed course of antibiotics.   *Afebrile since admission, hemodynamically stable.  WBC WNL. CRP 12.4.   *CT lumbar spine as above (review formal report for complete details)   *MRI lumbar spine Discitis osteomyelitis at L4-5. 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 At L4-5, severe spinal stenosis secondary to the epidural abscess. At  L5-S1, moderate spinal stenosis secondary to the epidural abscess.    --Status post IR guided L4-L5 disc aspiration and L4 biopsy. 1 ml of rust colored cloudy fluid aspirated from L4-5 disc space. Large core specimen also obtained from L4 interior endplate  --Started on IV vancomycin on 6/19 post IR guided aspiration.    Blood cultures no growth to date.  Intraoperative biopsy, aspirate - no growth to date.  Continue intravenous vancomycin.  Noted allergies to penicillin, cephalosporins and sulfa.  Will place midline on 6/23 if cultures negative.  --Infectious disease followed - 6 weeks of IV vancomycin.  --Neurosurgery signed off, no surgical intervention.  Pain management followed.  Appreciate multidisciplinary team input.  Trend WBC count, fever curve.  --Continue as needed Tylenol, as needed oxycodone.  Continue as needed Flexeril.  Note pt utilizes NSAIDs solely at home for pain control.  Minimize narcotic use as able to.  Rehab team following.   SW following for discharge disposition.    Bacteriuria with cultures negative  Recent severe sepsis in the setting of Klebsiella UTI:   Hospitalization on 5/16/2024-5/22/2024 due to severe sepsis related to UTI caused by Klebsiella oxytoca. Patient was on IV Vanco, Levaquin, Cefepime, and Ceftriaxone in the hospital. He was discharged on oral Cefdinir and Levaquin. No current sx of UTI.   -UA with trace blood in urine, few bacteria otherwise unremarkable.   Urine cultures with less than 10,000 mixed fred.  CT A/P on admission negative for renal calculi or hydronephrosis, note left renal cyst.      Uncontrolled type 2 diabetes mellitus with a hemoglobin A1c of 8.0.  Peripheral neuropathy.  PTA on glipizide, semaglutide, as needed gabapentin.  Continue PTA glipizide.  Will start on scheduled gabapentin 100 mg 3 times daily.  Started on insulin Lantus on 6/19, continue insulin Lantus 6 units twice daily.    Continue sliding scale insulin.  Monitor blood sugars, optimize  regimen.  Hypoglycemia protocol in place.    CAD, subtotal occlusion in small distal LAD.  HFpEF, not in acute exacerbation   Mild aortic stenosis   HTN, HLD.   Denies any chest pain or palpitations or shortness of breath.    Euvolemic.  Blood pressure within normal limits.  Echo 5/16/24 with preserved EF, borderline global hypokinesia, wall motion consistent with conduction abnormality.   Hold PTA torsemide.  Reassess in a.m. for resumption.  Continue PTA lisinopril 10 mg oral daily.  PTA aspirin was on hold resumed 6/21.     CKD IIIa:   Baseline creatinine 1.3-1.6 more recently. Stable on admission.   Continue PTA sodium bicarbonate.     Normocytic anemia: Baseline Hgb 8-9 range. 10.2 on admission.   - Monitor   Continue PTA iron supplements.     Gout: Not in a flare.  Continue PTA colchicine.     Biliary obstruction with stricture s/p EUS/ERCP 4/30/24 with sphincterotomy and temporary stent placement   *Admission CT notes common bile duct stent with slight dilatation of the intrahepatic bile ducts. A tiny portion of the distal stent appears fractured, of doubtful clinical significance. Pt without abdominal pain.   Continue PTA Ursodiol 300 mg BID    Hx of TIA, 1993, no residual deficits  PAD s/p toe amputation   Resume PTA aspirin.     Baseline R>L DELFINO, mild     Physical deconditioning from medical illness, senile frailty.  [facility] [place]  Ambulation: walker for short distances, wheelchair for longer distances  Rehab team following, will likely need TCU.    Obesity with a BMI of 30.11.  Increase all-cause mortality and morbidity.  Consider lifestyle modification with diet and exercise as able to.    Sleep apnea:   Does not use CPAP  Recommend sleep studies as outpatient.    Orders Placed This Encounter      Regular Diet Adult      DVT Prophylaxis: sCDs, ambulate. Pharmacological DVT prophylaxis until neurosurgery, IR input.  Code Status: Full Code  Disposition: Expected discharge 6/23 pending final  cultures, safe discharge plan in place.    Medically Ready for Discharge: Anticipated Tomorrow     Discussed with patient, bedside RN  >35 minutes spent by me on the date of service doing chart review, history, exam, documentation & further activities per the note.      Tasha Vivar MD        Interval History:        Patient lying in bed.  Denies any chest pain or palpitation.  Denies any headache or dizziness.  Denies any nausea or vomiting.  Denies any new tingling or numbness.  Denies any new urinary or bowel incontinence.  Per report up with assistance of 1 and gait belt and walker.  Complaining of ongoing pain in his back.  Has been receiving scheduled Flexeril, as needed oxycodone.         Physical Exam:        Physical Exam   Pulse:  [104] 104  BP: (140)/(79) 140/79  SpO2:  [95 %] 95 %    Intake/Output Summary (Last 24 hours) at 6/19/2024 1456  Last data filed at 6/19/2024 0900  Gross per 24 hour   Intake 1000 ml   Output 1250 ml   Net -250 ml     PHYSICAL EXAM  GENERAL: Patient is in no distress. Alert and oriented.  HEART: Regular rate and rhythm. S1S2.   LUNGS: Respirations unlabored  ABDOMEN: Soft, no abdominal tenderness, bowel sounds heard   NEURO: Moving all extremities.  EXTREMITIES: trace pedal edema.  SKIN: Warm, dry. No rash  PSYCHIATRY Cooperative       Medications:        Current Facility-Administered Medications   Medication Dose Route Frequency Provider Last Rate Last Admin    acetaminophen (TYLENOL) tablet 650 mg  650 mg Oral Q6H Nelsy Tee APRN CNP   650 mg at 06/22/24 1517    aspirin EC tablet 81 mg  81 mg Oral Daily Tasha Vivar MD   81 mg at 06/22/24 0836    colchicine (COLCRYS) tablet 0.6 mg  0.6 mg Oral Daily Tasha Vivar MD   0.6 mg at 06/22/24 0836    diclofenac (VOLTAREN) 1 % topical gel 2 g  2 g Topical 4x Daily Nelsy Tee APRN CNP   2 g at 06/22/24 0837    ferrous gluconate (FERGON) tablet 324 mg  324 mg Oral Daily Tasha Vivar MD   324 mg at  06/22/24 0836    gabapentin (NEURONTIN) capsule 100 mg  100 mg Oral TID Tasha Vivar MD   100 mg at 06/22/24 1517    glipiZIDE (GLUCOTROL XL) 24 hr tablet 2.5 mg  2.5 mg Oral Daily with breakfast Tasha Vivar MD   2.5 mg at 06/22/24 0836    insulin aspart (NovoLOG) injection (RAPID ACTING)  1-3 Units Subcutaneous TID AC Socorro Aceves MD   2 Units at 06/22/24 1154    insulin aspart (NovoLOG) injection (RAPID ACTING)  1-3 Units Subcutaneous At Bedtime Socorro Aceves MD   1 Units at 06/21/24 2133    insulin glargine (LANTUS PEN) injection 6 Units  6 Units Subcutaneous BID Tasha Vivar MD   6 Units at 06/22/24 0839    lactobacillus rhamnosus (GG) (CULTURELL) capsule 2 capsule  2 capsule Oral Daily Tasha Vivar MD   2 capsule at 06/22/24 1154    lisinopril (ZESTRIL) tablet 10 mg  10 mg Oral Daily Tasha Vivar MD   10 mg at 06/22/24 0836    methocarbamol (ROBAXIN) half-tab 250 mg  250 mg Oral TID Nelsy Tee APRN CNP   250 mg at 06/22/24 1516    sodium bicarbonate tablet 1,950 mg  1,950 mg Oral TID Tasha Vivar MD   1,950 mg at 06/22/24 1153    sodium chloride (PF) 0.9% PF flush 3 mL  3 mL Intracatheter Q8H Socorro Aceves MD   3 mL at 06/22/24 1155    ursodiol (ACTIGALL) capsule 300 mg  300 mg Oral BID Tasha Vivar MD   300 mg at 06/22/24 0836    vancomycin (VANCOCIN) 1,500 mg in 0.9% NaCl 250 mL intermittent infusion  1,500 mg Intravenous Q24H Tasha Vivar  mL/hr at 06/19/24 1834 1,500 mg at 06/21/24 1804     Current Facility-Administered Medications   Medication Dose Route Frequency Provider Last Rate Last Admin    acetaminophen (TYLENOL) tablet 650 mg  650 mg Oral Q4H PRN Socorro Aceves MD   650 mg at 06/19/24 0830    Or    acetaminophen (TYLENOL) Suppository 650 mg  650 mg Rectal Q4H PRN Socorro Aceves MD        calcium carbonate (TUMS) chewable tablet 1,000 mg  1,000 mg Oral 4x Daily PRN Socorro Aceves  MD Kj        glucose gel 15-30 g  15-30 g Oral Q15 Min PRN Socorro Aceves MD        Or    dextrose 50 % injection 25-50 mL  25-50 mL Intravenous Q15 Min PRN Socorro Aceves MD        Or    glucagon injection 1 mg  1 mg Subcutaneous Q15 Min PRN Socorro Aceves MD        famotidine (PEPCID) tablet 20 mg  20 mg Oral BID PRN Tasha Vivar MD        fluticasone (FLONASE) 50 MCG/ACT spray 1-2 spray  1-2 spray Nasal Daily PRN Tasha Vivar MD        HYDROmorphone (DILAUDID) injection 0.2 mg  0.2 mg Intravenous Q8H PRN Nelsy Tee, RANJAN MORGAN        lidocaine (LMX4) cream   Topical Q1H PRN Socorro Aceves MD        lidocaine (XYLOCAINE) 5 % ointment   Topical TID PRN Nelsy Tee, RANJAN CNP   Given at 06/21/24 0101    lidocaine 1 % 0.1-1 mL  0.1-1 mL Other Q1H PRN Socorro Aceves MD        loratadine (CLARITIN) tablet 10 mg  10 mg Oral Daily PRN Tasha Vivar MD        naloxone (NARCAN) injection 0.2 mg  0.2 mg Intravenous Q2 Min PRN Socorro Aceves MD        Or    naloxone (NARCAN) injection 0.4 mg  0.4 mg Intravenous Q2 Min PRN Socorro Aceves MD        Or    naloxone (NARCAN) injection 0.2 mg  0.2 mg Intramuscular Q2 Min PRN Socorro Aceves MD        Or    naloxone (NARCAN) injection 0.4 mg  0.4 mg Intramuscular Q2 Min PRN Socorro Aceves MD        nortriptyline (PAMELOR) capsule 20 mg  20 mg Oral At Bedtime PRN Tasha Vivar MD        ondansetron (ZOFRAN ODT) ODT tab 4 mg  4 mg Oral Q6H PRN Socorro Aceves MD        Or    ondansetron (ZOFRAN) injection 4 mg  4 mg Intravenous Q6H PRN Socorro Aceves MD        oxyCODONE IR (ROXICODONE) half-tab 2.5 mg  2.5 mg Oral Q4H PRN Lida Bowers PA-C   2.5 mg at 06/18/24 1328    Or    oxyCODONE (ROXICODONE) tablet 5 mg  5 mg Oral Q4H PRN Lida Bowers PA-C   5 mg at 06/21/24 1958    senna-docusate (SENOKOT-S/PERICOLACE) 8.6-50 MG per  tablet 1 tablet  1 tablet Oral BID PRN Socorro Aceves MD        Or    senna-docusate (SENOKOT-S/PERICOLACE) 8.6-50 MG per tablet 2 tablet  2 tablet Oral BID PRN Socorro Aceves MD        sodium chloride (PF) 0.9% PF flush 3 mL  3 mL Intracatheter q1 min prn Socorro Aceves MD   3 mL at 06/18/24 9099            Data:      All new lab and imaging data was reviewed.

## 2024-06-22 NOTE — PROVIDER NOTIFICATION
Paged oncall provider for patient requesting PRN Lidocaine patches for back pain. Only has topical Lidocaine available. Awaiting new orders.

## 2024-06-22 NOTE — PLAN OF CARE
Orientation: A&Ox4    Vitals/Tele: VSS RA; pain managed with scheduled robaxin, tylenol and topical Voltaren gel     IV Access/drains: PIV SL    Diet: Regular    Mobility: A2 GB&W    GI/: Voiding in urinal at bedside. No BM this shift.    Wound/Skin: Scattered bruising, pale. Blanchable redness coccyx, mepilex in place.     Consults: SW following    Discharge Plan: TCU pending placement      See Flow sheets for assessment

## 2024-06-23 LAB
BACTERIA BLD CULT: NO GROWTH
BACTERIA BLD CULT: NO GROWTH
CREAT SERPL-MCNC: 1.18 MG/DL (ref 0.67–1.17)
EGFRCR SERPLBLD CKD-EPI 2021: 62 ML/MIN/1.73M2
GLUCOSE BLDC GLUCOMTR-MCNC: 130 MG/DL (ref 70–99)
GLUCOSE BLDC GLUCOMTR-MCNC: 137 MG/DL (ref 70–99)
GLUCOSE BLDC GLUCOMTR-MCNC: 139 MG/DL (ref 70–99)
GLUCOSE BLDC GLUCOMTR-MCNC: 145 MG/DL (ref 70–99)
GLUCOSE BLDC GLUCOMTR-MCNC: 199 MG/DL (ref 70–99)
GLUCOSE BLDC GLUCOMTR-MCNC: 243 MG/DL (ref 70–99)
VANCOMYCIN SERPL-MCNC: 17.4 UG/ML

## 2024-06-23 PROCEDURE — 99232 SBSQ HOSP IP/OBS MODERATE 35: CPT | Performed by: HOSPITALIST

## 2024-06-23 PROCEDURE — 250N000009 HC RX 250: Performed by: PHYSICIAN ASSISTANT

## 2024-06-23 PROCEDURE — 250N000013 HC RX MED GY IP 250 OP 250 PS 637: Performed by: PHYSICIAN ASSISTANT

## 2024-06-23 PROCEDURE — 258N000003 HC RX IP 258 OP 636: Mod: JZ | Performed by: HOSPITALIST

## 2024-06-23 PROCEDURE — 250N000013 HC RX MED GY IP 250 OP 250 PS 637

## 2024-06-23 PROCEDURE — 250N000011 HC RX IP 250 OP 636: Performed by: HOSPITALIST

## 2024-06-23 PROCEDURE — 36569 INSJ PICC 5 YR+ W/O IMAGING: CPT

## 2024-06-23 PROCEDURE — 250N000013 HC RX MED GY IP 250 OP 250 PS 637: Performed by: HOSPITALIST

## 2024-06-23 PROCEDURE — 999N000111 HC STATISTIC OT IP EVAL DEFER

## 2024-06-23 PROCEDURE — 80202 ASSAY OF VANCOMYCIN: CPT | Performed by: HOSPITALIST

## 2024-06-23 PROCEDURE — 272N000450 HC KIT 4FR POWER PICC SINGLE LUMEN

## 2024-06-23 PROCEDURE — 36415 COLL VENOUS BLD VENIPUNCTURE: CPT | Performed by: HOSPITALIST

## 2024-06-23 PROCEDURE — 82565 ASSAY OF CREATININE: CPT | Performed by: HOSPITALIST

## 2024-06-23 PROCEDURE — 120N000001 HC R&B MED SURG/OB

## 2024-06-23 RX ORDER — TORSEMIDE 10 MG/1
10 TABLET ORAL DAILY
Status: DISCONTINUED | OUTPATIENT
Start: 2024-06-23 | End: 2024-06-24

## 2024-06-23 RX ADMIN — METHOCARBAMOL 250 MG: 500 TABLET ORAL at 15:05

## 2024-06-23 RX ADMIN — LIDOCAINE HYDROCHLORIDE 1 ML: 10 INJECTION, SOLUTION EPIDURAL; INFILTRATION; INTRACAUDAL; PERINEURAL at 09:08

## 2024-06-23 RX ADMIN — ASPIRIN 81 MG: 81 TABLET, COATED ORAL at 08:11

## 2024-06-23 RX ADMIN — URSODIOL 300 MG: 300 CAPSULE ORAL at 21:59

## 2024-06-23 RX ADMIN — GABAPENTIN 100 MG: 100 CAPSULE ORAL at 21:58

## 2024-06-23 RX ADMIN — METHOCARBAMOL 250 MG: 500 TABLET ORAL at 08:10

## 2024-06-23 RX ADMIN — DICLOFENAC 2 G: 10 GEL TOPICAL at 17:22

## 2024-06-23 RX ADMIN — LISINOPRIL 10 MG: 10 TABLET ORAL at 08:10

## 2024-06-23 RX ADMIN — DICLOFENAC 2 G: 10 GEL TOPICAL at 22:05

## 2024-06-23 RX ADMIN — OXYCODONE HYDROCHLORIDE 5 MG: 5 TABLET ORAL at 08:10

## 2024-06-23 RX ADMIN — ACETAMINOPHEN 650 MG: 325 TABLET, FILM COATED ORAL at 08:11

## 2024-06-23 RX ADMIN — SODIUM BICARBONATE 650 MG TABLET 1950 MG: at 12:36

## 2024-06-23 RX ADMIN — METHOCARBAMOL 250 MG: 500 TABLET ORAL at 21:59

## 2024-06-23 RX ADMIN — DICLOFENAC 2 G: 10 GEL TOPICAL at 08:11

## 2024-06-23 RX ADMIN — ACETAMINOPHEN 650 MG: 325 TABLET, FILM COATED ORAL at 02:59

## 2024-06-23 RX ADMIN — INSULIN ASPART 1 UNITS: 100 INJECTION, SOLUTION INTRAVENOUS; SUBCUTANEOUS at 12:36

## 2024-06-23 RX ADMIN — FERROUS GLUCONATE 324 MG: 324 TABLET ORAL at 08:10

## 2024-06-23 RX ADMIN — VANCOMYCIN HYDROCHLORIDE 1500 MG: 10 INJECTION, POWDER, LYOPHILIZED, FOR SOLUTION INTRAVENOUS at 18:07

## 2024-06-23 RX ADMIN — COLCHICINE 0.6 MG: 0.6 TABLET ORAL at 08:10

## 2024-06-23 RX ADMIN — URSODIOL 300 MG: 300 CAPSULE ORAL at 08:10

## 2024-06-23 RX ADMIN — INSULIN ASPART 2 UNITS: 100 INJECTION, SOLUTION INTRAVENOUS; SUBCUTANEOUS at 17:22

## 2024-06-23 RX ADMIN — TORSEMIDE 10 MG: 10 TABLET ORAL at 15:05

## 2024-06-23 RX ADMIN — GABAPENTIN 100 MG: 100 CAPSULE ORAL at 15:05

## 2024-06-23 RX ADMIN — GLIPIZIDE 2.5 MG: 2.5 TABLET, FILM COATED, EXTENDED RELEASE ORAL at 08:11

## 2024-06-23 RX ADMIN — OXYCODONE HYDROCHLORIDE 5 MG: 5 TABLET ORAL at 15:36

## 2024-06-23 RX ADMIN — ACETAMINOPHEN 650 MG: 325 TABLET, FILM COATED ORAL at 15:05

## 2024-06-23 RX ADMIN — ACETAMINOPHEN 650 MG: 325 TABLET, FILM COATED ORAL at 19:44

## 2024-06-23 RX ADMIN — SODIUM BICARBONATE 650 MG TABLET 1950 MG: at 19:45

## 2024-06-23 RX ADMIN — SODIUM BICARBONATE 650 MG TABLET 1950 MG: at 08:11

## 2024-06-23 RX ADMIN — Medication 2 CAPSULE: at 12:36

## 2024-06-23 RX ADMIN — GABAPENTIN 100 MG: 100 CAPSULE ORAL at 08:10

## 2024-06-23 ASSESSMENT — ACTIVITIES OF DAILY LIVING (ADL)
ADLS_ACUITY_SCORE: 41
ADLS_ACUITY_SCORE: 40
ADLS_ACUITY_SCORE: 41
ADLS_ACUITY_SCORE: 40
ADLS_ACUITY_SCORE: 41
ADLS_ACUITY_SCORE: 40
ADLS_ACUITY_SCORE: 41
ADLS_ACUITY_SCORE: 40
ADLS_ACUITY_SCORE: 41
ADLS_ACUITY_SCORE: 41
ADLS_ACUITY_SCORE: 40
ADLS_ACUITY_SCORE: 41
ADLS_ACUITY_SCORE: 40
ADLS_ACUITY_SCORE: 41
ADLS_ACUITY_SCORE: 40
ADLS_ACUITY_SCORE: 41
ADLS_ACUITY_SCORE: 40
ADLS_ACUITY_SCORE: 41
ADLS_ACUITY_SCORE: 41

## 2024-06-23 NOTE — PLAN OF CARE
Orientation: A&Ox4     Vitals/Tele: VSS RA, pain managed with scheduled tylenol, robaxin, voltaren gel and prn oxycodone.     CMS/Neuro: Baseline numbness/tingling BLE, BUE 3rd and 4th finger baseline numbness. Otherwise neuros intact.     IV Access/drains: PIV SL    Diet: Regular    Mobility: A1-2 GB&W    GI/: Voiding in urinal, BM today on day shift.     Wound/Skin: Blanchable redness on coccyx. Mepilex in place.     Consults: ID and SW following    Discharge Plan: TCU pending placement      See Flow sheets for assessment

## 2024-06-23 NOTE — PLAN OF CARE
Occupational Therapy: Orders received. Chart reviewed and discussed with care team.? Occupational Therapy not indicated due to pt transferring to TCU at discharge. Followed pt's discharge plan throughout weekend to assess if OT needing to be initiated. Per previous therapist, if plan to go to TCU, defer OT needs to next level of care. PT will cont to follow while IP.? Defer discharge recommendations to care team.? Will complete orders.

## 2024-06-23 NOTE — PLAN OF CARE
Goal Outcome Evaluation:    .Date/Time 6/23/24 [id number]    Trauma/Ortho/Medical: Medical    Diagnosis: Discitis/Osteomyelitis L4-L5 with epidural abscess   POD#: N/A   Mental Status: A&Ox4  Activity/dangle Up A1 gb and walker. Up to recliner for meals.   Diet: Regular diet, good appetite.   Pain: Managed with PRN medications and scheduled tylenol/robaxin. Pain improving per patient.   Barnes/Voiding: Voiding spontaneously in urinal. 1 BM today.   Tele/Restraints/Iso: N/A  02/LDA: PIV running Vancomycin now.   D/C Date: TBD, pending TCU placement.   Other Info: PICC placed to [facility], blood return noted.

## 2024-06-23 NOTE — PROGRESS NOTES
[facility]  Hospitalist Progress Note   06/23/2024          Assessment and Plan:       Lance Ibrahim is a 79 year old male with history of heart failure with preserved ejection fraction, hypertension, dyslipidemia, CKD, gout, BPH, type 2 diabetes and chronic low back pain for which he is folllowed by [facility] and has an implantable spine stimulator ([employer], MRI conditional 4/2024) in place. He initially presented to [facility] with acute on chronic, intractable pain with RLE radiculopathy. CT was concerning for discitis-osteomyelitis at L4-5 with epidural/intraforaminal phlegmon vs abscess at L4-S1 with severe foraminal narrowing. Transferred to [facility] for Neurosurgery evaluation and ID consultation.     Hospitalization on 5/16/2024-5/22/2024 due to severe sepsis related to UTI caused by Klebsiella oxytoca.    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  Acute on chronic back pain with RLE radiculopathy.  Severe spinal stenosis.  --Presented with acute on chronic back pain located on the right side of his back.  Significant tenderness to palpation at approximately the L3-L4 level on the right side with associated RLE radiculopathy. No bladder or bowel incontinence, no saddle anesthesia. Uses a walker for shorter distances and wheelchair for longer distances at baseline. No recent trauma or falls, but did get a trigger point injection by [facility] on 6/14.   *Afebrile since admission, hemodynamically stable.  WBC WNL. CRP 12.4.   *CT lumbar spine as above (review formal report for complete details)   *MRI lumbar spine Discitis osteomyelitis at L4-5. 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 At L4-5, severe spinal stenosis secondary to the epidural abscess. At L5-S1, moderate spinal stenosis secondary to the epidural  abscess.  --Status post IR guided L4-L5 disc aspiration and L4 biopsy. 1 ml of rust colored cloudy fluid aspirated from L4-5 disc space. Large core specimen also obtained from L4 interior endplate  --Started on IV vancomycin on 6/19 post IR guided aspiration.    Blood cultures no growth to date.  Intraoperative biopsy, aspirate - no growth to date.  Continue intravenous vancomycin.  PICC line placed 6/23.  Noted allergies to penicillin, cephalosporins and sulfa.   --Infectious disease followed - Anticipate at least 6 weeks of IV antibiotics.   --Neurosurgery signed off, no surgical intervention.  Pain management followed.  Appreciate multidisciplinary team input.  Trend WBC count, fever curve.  --Continue as needed Tylenol, as needed oxycodone.  Continue as needed Flexeril.Note pt utilizes NSAIDs solely at home for pain control.  Minimize narcotic use as able to.  Rehab team following.   SW following for discharge disposition.  Follow-up with infectious disease in clinic in 2 weeks.  Follow-up with neurosurgery in clinic per schedule, plan to repeat MRI in 6 weeks.    Bacteriuria with cultures negative  Recent severe sepsis in the setting of Klebsiella UTI:   Hospitalization on 5/16/2024-5/22/2024 due to severe sepsis related to UTI caused by Klebsiella oxytoca. Patient was on IV Vanco, Levaquin, Cefepime, and Ceftriaxone in the hospital. He was discharged on oral Cefdinir and Levaquin. No current sx of UTI.   -UA with trace blood in urine, few bacteria otherwise unremarkable.   Urine cultures with less than 10,000 mixed fred.  CT A/P on admission negative for renal calculi or hydronephrosis, note left renal cyst.      Uncontrolled type 2 diabetes mellitus with a hemoglobin A1c of 8.0.  Peripheral neuropathy.  PTA on glipizide, semaglutide, as needed gabapentin.  Continue PTA glipizide.  Continue scheduled gabapentin 100 mg 3 times daily.  Started on insulin Lantus on 6/19, continue insulin Lantus 6 units twice  daily.    Continue sliding scale insulin.  Monitor blood sugars, optimize regimen.  Hypoglycemia protocol in place.    CAD, subtotal occlusion in small distal LAD.  HFpEF, not in acute exacerbation   Mild aortic stenosis   HTN, HLD.   Denies any chest pain or palpitations or shortness of breath.    Euvolemic.  Blood pressure within normal limits.  Echo 5/16/24 with preserved EF, borderline global hypokinesia, wall motion consistent with conduction abnormality.   Continue PTA lisinopril 10 mg oral daily.  PTA aspirin was on hold, resumed 6/21.  PTA torsemide on hold, resumed 6/23 low-dose of 10 mg oral daily.  Monitor blood pressures, optimize regimen.     CKD IIIa:   Baseline creatinine 1.3-1.6 more recently. Stable on admission.   Continue PTA sodium bicarbonate.     Normocytic anemia: Baseline Hgb 8-9 range. 10.2 on admission.   Continue PTA iron supplements.  Monitor.     Gout: Not in a flare.  Continue PTA colchicine.     Biliary obstruction with stricture s/p EUS/ERCP 4/30/24 with sphincterotomy and temporary stent placement   *Admission CT notes common bile duct stent with slight dilatation of the intrahepatic bile ducts. A tiny portion of the distal stent appears fractured, of doubtful clinical significance. Pt without abdominal pain.   Continue PTA Ursodiol 300 mg BID    Hx of TIA, 1993, no residual deficits  PAD s/p toe amputation   Continue PTA aspirin.     Baseline R>L DELFINO, mild     Physical deconditioning from medical illness, senile frailty.  [facility] in [place]  Ambulation: walker for short distances, wheelchair for longer distances  Rehab team following, will likely need TCU.    Obesity with a BMI of 30.11.  Increase all-cause mortality and morbidity.  Consider lifestyle modification with diet and exercise as able to.    Sleep apnea:   Does not use CPAP  Recommend sleep studies as outpatient.    Orders Placed This Encounter      Regular Diet Adult      DVT Prophylaxis: sCDs, ambulate.  Pharmacological DVT prophylaxis until neurosurgery, IR input.  Code Status: Full Code  Disposition: Expected discharge pending safe discharge plan in place.    Medically Ready for Discharge: Anticipated Today     Discussed with patient, bedside RN  >35 minutes spent by me on the date of service doing chart review, history, exam, documentation & further activities per the note.      Tasha Vivar MD        Interval History:        Patient lying in bed.  Denies any chest pain or palpitation.  Denies any headache or dizziness.  Denies any nausea or vomiting.  Denies any new tingling or numbness.  Denies any new urinary or bowel incontinence.  Per report up with assistance of 1 and gait belt and walker.  Complaining of ongoing pain in his back.  Has been receiving scheduled Flexeril, as needed oxycodone.  Cultures no growth to date.  Social work assisting with transition plans.         Physical Exam:        Physical Exam   Temp:  [98.6  F (37  C)-99  F (37.2  C)] 98.7  F (37.1  C)  Pulse:  [94-95] 95  Resp:  [18] 18  BP: (134-145)/(73-86) 139/86  SpO2:  [93 %-98 %] 95 %    Intake/Output Summary (Last 24 hours) at 6/19/2024 1456  Last data filed at 6/19/2024 0900  Gross per 24 hour   Intake 1000 ml   Output 1250 ml   Net -250 ml     PHYSICAL EXAM  GENERAL: Patient is in no distress. Alert and oriented.  HEART: Regular rate and rhythm. S1S2.   LUNGS: Respirations unlabored  ABDOMEN: Soft, no abdominal tenderness, bowel sounds heard   NEURO: Moving all extremities.  EXTREMITIES: trace pedal edema.  SKIN: Warm, dry. No rash  PSYCHIATRY Cooperative       Medications:        Current Facility-Administered Medications   Medication Dose Route Frequency Provider Last Rate Last Admin    acetaminophen (TYLENOL) tablet 650 mg  650 mg Oral Q6H Nelsy Tee, RANJAN CNP   650 mg at 06/23/24 0811    aspirin EC tablet 81 mg  81 mg Oral Daily Tasha Vivar MD   81 mg at 06/23/24 0811    colchicine (COLCRYS) tablet 0.6 mg  0.6 mg  Oral Daily Tasha Vivar MD   0.6 mg at 06/23/24 0810    diclofenac (VOLTAREN) 1 % topical gel 2 g  2 g Topical 4x Daily Nelsy Tee APRN CNP   2 g at 06/23/24 0811    ferrous gluconate (FERGON) tablet 324 mg  324 mg Oral Daily Tasha Vivar MD   324 mg at 06/23/24 0810    gabapentin (NEURONTIN) capsule 100 mg  100 mg Oral TID Tasha Vivar MD   100 mg at 06/23/24 0810    glipiZIDE (GLUCOTROL XL) 24 hr tablet 2.5 mg  2.5 mg Oral Daily with breakfast Tasha Vivar MD   2.5 mg at 06/23/24 0811    insulin aspart (NovoLOG) injection (RAPID ACTING)  1-3 Units Subcutaneous TID AC Socorro Aceves MD   2 Units at 06/22/24 1154    insulin aspart (NovoLOG) injection (RAPID ACTING)  1-3 Units Subcutaneous At Bedtime Socorro Aceves MD   1 Units at 06/22/24 2135    insulin glargine (LANTUS PEN) injection 6 Units  6 Units Subcutaneous BID Tasha Vivar MD   6 Units at 06/23/24 0815    lactobacillus rhamnosus (GG) (CULTURELL) capsule 2 capsule  2 capsule Oral Daily Tasha Vivar MD   2 capsule at 06/22/24 1154    lisinopril (ZESTRIL) tablet 10 mg  10 mg Oral Daily Tasha Vivar MD   10 mg at 06/23/24 0810    methocarbamol (ROBAXIN) half-tab 250 mg  250 mg Oral TID Nelsy Tee APRN CNP   250 mg at 06/23/24 0810    sodium bicarbonate tablet 1,950 mg  1,950 mg Oral TID Tasha Vivar MD   1,950 mg at 06/23/24 0811    sodium chloride (PF) 0.9% PF flush 10-40 mL  10-40 mL Intracatheter Q7 Days Tsering Sorenson PA-C        sodium chloride (PF) 0.9% PF flush 3 mL  3 mL Intracatheter Q8H Socorro Aceves MD   3 mL at 06/22/24 1949    ursodiol (ACTIGALL) capsule 300 mg  300 mg Oral BID Tasha Vivar MD   300 mg at 06/23/24 0810    vancomycin (VANCOCIN) 1,500 mg in 0.9% NaCl 250 mL intermittent infusion  1,500 mg Intravenous Q24H Tasha Vivar  mL/hr at 06/19/24 1834 1,500 mg at 06/22/24 1807     Current Facility-Administered Medications    Medication Dose Route Frequency Provider Last Rate Last Admin    acetaminophen (TYLENOL) tablet 650 mg  650 mg Oral Q4H PRN Socorro Aceves MD   650 mg at 06/19/24 0830    Or    acetaminophen (TYLENOL) Suppository 650 mg  650 mg Rectal Q4H PRN Socorro Aceves MD        calcium carbonate (TUMS) chewable tablet 1,000 mg  1,000 mg Oral 4x Daily PRN Socorro Aceves MD        glucose gel 15-30 g  15-30 g Oral Q15 Min PRN Socorro Aceves MD        Or    dextrose 50 % injection 25-50 mL  25-50 mL Intravenous Q15 Min PRN Socorro Aceves MD        Or    glucagon injection 1 mg  1 mg Subcutaneous Q15 Min PRN Socorro Aceves MD        famotidine (PEPCID) tablet 20 mg  20 mg Oral BID PRN Tasha Vivar MD        fluticasone (FLONASE) 50 MCG/ACT spray 1-2 spray  1-2 spray Nasal Daily PRN Tasha Vivar MD        HYDROmorphone (DILAUDID) injection 0.2 mg  0.2 mg Intravenous Q8H PRN Nelsy Tee, RANJAN MORGAN        lidocaine (LMX4) cream   Topical Q1H PRN Socorro Aceves MD        lidocaine (XYLOCAINE) 5 % ointment   Topical TID PRN Nelsy Tee, RANJAN CNP   Given at 06/22/24 1807    lidocaine 1 % 0.1-1 mL  0.1-1 mL Other Q1H PRN Socorro Aceves MD        lidocaine 1 % 0.1-5 mL  0.1-5 mL Other Q1H PRN Tsering Sorenson PA-C   1 mL at 06/23/24 0908    loratadine (CLARITIN) tablet 10 mg  10 mg Oral Daily PRN Tasha Vivar MD        naloxone (NARCAN) injection 0.2 mg  0.2 mg Intravenous Q2 Min PRN Socorro Aceves MD        Or    naloxone (NARCAN) injection 0.4 mg  0.4 mg Intravenous Q2 Min PRN Socorro Aceves MD        Or    naloxone (NARCAN) injection 0.2 mg  0.2 mg Intramuscular Q2 Min PRN Socorro Aceves MD        Or    naloxone (NARCAN) injection 0.4 mg  0.4 mg Intramuscular Q2 Min PRN Socorro Aceves MD        nortriptyline (PAMELOR) capsule 20 mg  20 mg Oral At Bedtime PRN Tasha Vivar MD         ondansetron (ZOFRAN ODT) ODT tab 4 mg  4 mg Oral Q6H PRN Socorro Aceves MD        Or    ondansetron (ZOFRAN) injection 4 mg  4 mg Intravenous Q6H PRN Socorro Aceves MD        oxyCODONE IR (ROXICODONE) half-tab 2.5 mg  2.5 mg Oral Q4H PRN Lida Bowers PA-C   2.5 mg at 06/18/24 1328    Or    oxyCODONE (ROXICODONE) tablet 5 mg  5 mg Oral Q4H PRN Lida Bowers PA-C   5 mg at 06/23/24 0810    senna-docusate (SENOKOT-S/PERICOLACE) 8.6-50 MG per tablet 1 tablet  1 tablet Oral BID PRN Socorro Aceves MD        Or    senna-docusate (SENOKOT-S/PERICOLACE) 8.6-50 MG per tablet 2 tablet  2 tablet Oral BID PRN Socorro Aceves MD        sodium chloride (PF) 0.9% PF flush 10-20 mL  10-20 mL Intracatheter q1 min prn Tsering Sorenson PA-C        sodium chloride (PF) 0.9% PF flush 10-40 mL  10-40 mL Intracatheter Q1H PRN Tsering Sorenson PA-C        sodium chloride (PF) 0.9% PF flush 3 mL  3 mL Intracatheter q1 min prn oScorro Aceves MD   3 mL at 06/18/24 1825            Data:      All new lab and imaging data was reviewed.

## 2024-06-23 NOTE — PROCEDURES
[facility]    Single Lumen PICC Placement    Date/Time: 6/23/2024 9:11 AM    Performed by: Ramandeep Garcia RN  Authorized by: Tsering Sorenson PA-C  Indications: vascular access      UNIVERSAL PROTOCOL   Site Marked: Yes  Prior Images Obtained and Reviewed:  Yes  Required items: Required blood products, implants, devices and special equipment available    Patient identity confirmed:  Verbally with patient, hospital-assigned identification number and arm band  NA - No sedation, light sedation, or local anesthesia  Confirmation Checklist:  Patient's identity using two indicators, relevant allergies, procedure was appropriate and matched the consent or emergent situation and correct equipment/implants were available  Time out: Immediately prior to the procedure a time out was called    Universal Protocol: the Joint Commission Universal Protocol was followed    Preparation: Patient was prepped and draped in usual sterile fashion       ANESTHESIA    Local Anesthetic:  Lidocaine 1% without epinephrine  Anesthetic Total (mL):  1      SEDATION    Patient Sedated: No        Skin prep agent: skin prep agent completely dried prior to procedure  Sterile barriers: maximum sterile barriers were used: cap, mask, sterile gown, sterile gloves, and large sterile sheet  Hand hygiene: hand hygiene performed prior to central venous catheter insertion  Type of line used: PICC  Catheter type: single lumen  Lumen type: valved  Catheter size: 4 Fr  Brand: Bard  Lot number: [id number]  Placement method: venipuncture, MST and ultrasound  Number of attempts: 1  Difficulty threading catheter: no  Successful placement: yes  Orientation: right    Location: basilic vein  Tip Location: superior vena cava  Arm circumference: adults 10 cm  Extremity circumference: 32  Visible catheter length: 0  Internal length: 45 cm  Total catheter length: 45  Dressing and securement: chlorhexidine patch applied, site cleansed,  transparent securement dressing, transparent dressing, sterile dressing applied, statlock and line secured  Post procedure assessment: blood return through all ports and placement verified by 3CG technology  PROCEDURE   Patient Tolerance:  Patient tolerated the procedure well with no immediate complicationsDescribe Procedure: Nursing note   Successfully placed single lumen PICC on the right basilic vein on one attempt with good blood return noted.

## 2024-06-23 NOTE — PHARMACY-VANCOMYCIN DOSING SERVICE
Pharmacy Vancomycin Note  Date of Service 2024  Patient's  1945   79 year old, male    Indication: Abscess and Osteomyelitis of spine  Day of Therapy: 5  Current vancomycin regimen:  1500 mg IV q24h  Current vancomycin monitoring method: Trough (Method 1 = dosing nomogram)  Current vancomycin therapeutic monitoring goal: 15-20 mg/L    Current estimated CrCl = Estimated Creatinine Clearance: 61.3 mL/min (A) (based on SCr of 1.18 mg/dL (H)).    Creatinine for last 3 days  2024:  9:05 AM Creatinine 1.24 mg/dL  2024:  8:51 AM Creatinine 1.26 mg/dL  2024:  7:36 AM Creatinine 1.18 mg/dL    Recent Vancomycin Levels (past 3 days)  2024:  8:51 AM Vancomycin 17.7 ug/mL  2024:  4:17 PM Vancomycin 17.4 ug/mL    Vancomycin IV Administrations (past 72 hours)                     vancomycin (VANCOCIN) 1,500 mg in 0.9% NaCl 250 mL intermittent infusion (mg) 1,500 mg New Bag 24 1807     1,500 mg New Bag 24 1804     1,500 mg New Bag 24 1753                    Nephrotoxins and other renal medications (From now, onward)      Start     Dose/Rate Route Frequency Ordered Stop    24 1330  torsemide (DEMADEX) tablet 10 mg        Note to Pharmacy: PTA Sig:TAKE 1 TABLET DAILY      10 mg Oral DAILY 24 1318      24 0000  vancomycin (VANCOCIN) 1500 mg/250 mL IVPB         1,500 mg Intravenous EVERY 24 HOURS 24 1557 24 2359    24 1500  lisinopril (ZESTRIL) tablet 10 mg        Note to Pharmacy: PTA Sig:Take 1 tablet (10 mg) by mouth every 24 hours      10 mg Oral DAILY 24 1420      24 1730  vancomycin (VANCOCIN) 1,500 mg in 0.9% NaCl 250 mL intermittent infusion         1,500 mg  over 90 Minutes Intravenous EVERY 24 HOURS 24 1708                 Contrast Orders - past 72 hours (72h ago, onward)      None            Interpretation of levels and current regimen:  Vancomycin level is reflective of therapeutic level    Has serum creatinine  changed greater than 50% in last 72 hours: No    Urine output:  unable to determine    Renal Function: Improving      Plan:  Switch from AUC monitoring to trough monitoring d/t discitis. Extrapolated trough 16.5 mg/L. Continue current regimen of IV vancomycin 1500 mg q24h.   Vancomycin monitoring method: Trough (Method 1 = dosing nomogram)  Vancomycin therapeutic monitoring goal: 15-20 mg/L  Pharmacy will check vancomycin levels as appropriate in 1-3 Days.  Serum creatinine levels will be ordered daily for the first week of therapy and at least twice weekly for subsequent weeks.    TESHA CHEATHAM, [facility]

## 2024-06-23 NOTE — PLAN OF CARE
Goal Outcome Evaluation:    Orientation: A&Ox4     Vitals/Tele: VSS RA     Pain management: Scheduled tylenol given, denies pain     IV Access/drains: PIV SL, biopsy site dressing is CDI     Diet: Regular diet     Mobility: A 1-2  GB&W     GI/: Voiding in urinal at bedside. No BM this shift.     Wound/Skin: Scattered bruising, pale. Blanchable redness coccyx, mepilex in place.      Consults: SW following     Discharge Plan: Probably today to TCU

## 2024-06-24 ENCOUNTER — APPOINTMENT (OUTPATIENT)
Dept: PHYSICAL THERAPY | Facility: CLINIC | Age: 79
DRG: 477 | End: 2024-06-24
Attending: STUDENT IN AN ORGANIZED HEALTH CARE EDUCATION/TRAINING PROGRAM
Payer: COMMERCIAL

## 2024-06-24 LAB
ANION GAP SERPL CALCULATED.3IONS-SCNC: 13 MMOL/L (ref 7–15)
BACTERIA ASPIRATE CULT: NO GROWTH
BACTERIA TISS BX CULT: NO GROWTH
BUN SERPL-MCNC: 20.5 MG/DL (ref 8–23)
CALCIUM SERPL-MCNC: 9 MG/DL (ref 8.8–10.2)
CHLORIDE SERPL-SCNC: 102 MMOL/L (ref 98–107)
CREAT SERPL-MCNC: 1.14 MG/DL (ref 0.67–1.17)
DEPRECATED HCO3 PLAS-SCNC: 23 MMOL/L (ref 22–29)
EGFRCR SERPLBLD CKD-EPI 2021: 65 ML/MIN/1.73M2
GLUCOSE BLDC GLUCOMTR-MCNC: 123 MG/DL (ref 70–99)
GLUCOSE BLDC GLUCOMTR-MCNC: 146 MG/DL (ref 70–99)
GLUCOSE BLDC GLUCOMTR-MCNC: 155 MG/DL (ref 70–99)
GLUCOSE BLDC GLUCOMTR-MCNC: 187 MG/DL (ref 70–99)
GLUCOSE BLDC GLUCOMTR-MCNC: 271 MG/DL (ref 70–99)
GLUCOSE SERPL-MCNC: 146 MG/DL (ref 70–99)
GRAM STAIN RESULT: NORMAL
HGB BLD-MCNC: 9.4 G/DL (ref 13.3–17.7)
POTASSIUM SERPL-SCNC: 4.5 MMOL/L (ref 3.4–5.3)
SODIUM SERPL-SCNC: 138 MMOL/L (ref 135–145)

## 2024-06-24 PROCEDURE — 80048 BASIC METABOLIC PNL TOTAL CA: CPT | Performed by: HOSPITALIST

## 2024-06-24 PROCEDURE — 120N000001 HC R&B MED SURG/OB

## 2024-06-24 PROCEDURE — 250N000011 HC RX IP 250 OP 636: Performed by: HOSPITALIST

## 2024-06-24 PROCEDURE — 99232 SBSQ HOSP IP/OBS MODERATE 35: CPT | Performed by: HOSPITALIST

## 2024-06-24 PROCEDURE — 250N000013 HC RX MED GY IP 250 OP 250 PS 637

## 2024-06-24 PROCEDURE — 250N000013 HC RX MED GY IP 250 OP 250 PS 637: Performed by: HOSPITALIST

## 2024-06-24 PROCEDURE — 250N000013 HC RX MED GY IP 250 OP 250 PS 637: Performed by: PHYSICIAN ASSISTANT

## 2024-06-24 PROCEDURE — 99233 SBSQ HOSP IP/OBS HIGH 50: CPT

## 2024-06-24 PROCEDURE — 99233 SBSQ HOSP IP/OBS HIGH 50: CPT | Performed by: SPECIALIST

## 2024-06-24 PROCEDURE — 85018 HEMOGLOBIN: CPT | Performed by: HOSPITALIST

## 2024-06-24 PROCEDURE — 250N000011 HC RX IP 250 OP 636: Mod: JZ | Performed by: SPECIALIST

## 2024-06-24 PROCEDURE — 97530 THERAPEUTIC ACTIVITIES: CPT | Mod: GP

## 2024-06-24 PROCEDURE — 258N000003 HC RX IP 258 OP 636: Mod: JZ | Performed by: HOSPITALIST

## 2024-06-24 PROCEDURE — 97116 GAIT TRAINING THERAPY: CPT | Mod: GP

## 2024-06-24 RX ORDER — FERROUS GLUCONATE 324(38)MG
324 TABLET ORAL DAILY
DISCHARGE
Start: 2024-06-25

## 2024-06-24 RX ORDER — GABAPENTIN 100 MG/1
100 CAPSULE ORAL 3 TIMES DAILY
DISCHARGE
Start: 2024-06-24 | End: 2024-07-10

## 2024-06-24 RX ORDER — AMOXICILLIN 250 MG
1 CAPSULE ORAL 2 TIMES DAILY PRN
DISCHARGE
Start: 2024-06-24 | End: 2024-07-10

## 2024-06-24 RX ORDER — TORSEMIDE 20 MG/1
20 TABLET ORAL DAILY
Status: DISCONTINUED | OUTPATIENT
Start: 2024-06-25 | End: 2024-06-25 | Stop reason: HOSPADM

## 2024-06-24 RX ORDER — TORSEMIDE 10 MG/1
10 TABLET ORAL ONCE
Status: COMPLETED | OUTPATIENT
Start: 2024-06-24 | End: 2024-06-24

## 2024-06-24 RX ORDER — METHOCARBAMOL 500 MG/1
500 TABLET, FILM COATED ORAL 3 TIMES DAILY PRN
DISCHARGE
Start: 2024-06-24 | End: 2024-06-25

## 2024-06-24 RX ORDER — ERTAPENEM 1 G/1
1 INJECTION, POWDER, LYOPHILIZED, FOR SOLUTION INTRAMUSCULAR; INTRAVENOUS EVERY 24 HOURS
Status: DISCONTINUED | OUTPATIENT
Start: 2024-06-24 | End: 2024-06-25 | Stop reason: HOSPADM

## 2024-06-24 RX ORDER — ACETAMINOPHEN 325 MG/1
650 TABLET ORAL EVERY 6 HOURS PRN
DISCHARGE
Start: 2024-06-24

## 2024-06-24 RX ORDER — ERTAPENEM 1 G/1
1 INJECTION, POWDER, LYOPHILIZED, FOR SOLUTION INTRAMUSCULAR; INTRAVENOUS EVERY 24 HOURS
DISCHARGE
Start: 2024-06-24 | End: 2024-08-16

## 2024-06-24 RX ADMIN — GABAPENTIN 100 MG: 100 CAPSULE ORAL at 21:32

## 2024-06-24 RX ADMIN — VANCOMYCIN HYDROCHLORIDE 1500 MG: 10 INJECTION, POWDER, LYOPHILIZED, FOR SOLUTION INTRAVENOUS at 17:57

## 2024-06-24 RX ADMIN — GLIPIZIDE 2.5 MG: 2.5 TABLET, FILM COATED, EXTENDED RELEASE ORAL at 09:54

## 2024-06-24 RX ADMIN — GABAPENTIN 100 MG: 100 CAPSULE ORAL at 17:01

## 2024-06-24 RX ADMIN — URSODIOL 300 MG: 300 CAPSULE ORAL at 20:36

## 2024-06-24 RX ADMIN — ASPIRIN 81 MG: 81 TABLET, COATED ORAL at 09:54

## 2024-06-24 RX ADMIN — OXYCODONE HYDROCHLORIDE 5 MG: 5 TABLET ORAL at 07:56

## 2024-06-24 RX ADMIN — DICLOFENAC 2 G: 10 GEL TOPICAL at 17:01

## 2024-06-24 RX ADMIN — DICLOFENAC 2 G: 10 GEL TOPICAL at 09:56

## 2024-06-24 RX ADMIN — LISINOPRIL 10 MG: 10 TABLET ORAL at 09:54

## 2024-06-24 RX ADMIN — METHOCARBAMOL 250 MG: 500 TABLET ORAL at 21:32

## 2024-06-24 RX ADMIN — GABAPENTIN 100 MG: 100 CAPSULE ORAL at 09:54

## 2024-06-24 RX ADMIN — INSULIN ASPART 1 UNITS: 100 INJECTION, SOLUTION INTRAVENOUS; SUBCUTANEOUS at 07:56

## 2024-06-24 RX ADMIN — SODIUM BICARBONATE 650 MG TABLET 1950 MG: at 20:36

## 2024-06-24 RX ADMIN — DICLOFENAC 2 G: 10 GEL TOPICAL at 13:00

## 2024-06-24 RX ADMIN — Medication 2 CAPSULE: at 12:26

## 2024-06-24 RX ADMIN — URSODIOL 300 MG: 300 CAPSULE ORAL at 09:54

## 2024-06-24 RX ADMIN — TORSEMIDE 10 MG: 10 TABLET ORAL at 09:55

## 2024-06-24 RX ADMIN — METHOCARBAMOL 250 MG: 500 TABLET ORAL at 09:54

## 2024-06-24 RX ADMIN — TORSEMIDE 10 MG: 10 TABLET ORAL at 14:11

## 2024-06-24 RX ADMIN — ACETAMINOPHEN 650 MG: 325 TABLET, FILM COATED ORAL at 20:36

## 2024-06-24 RX ADMIN — SODIUM BICARBONATE 650 MG TABLET 1950 MG: at 12:26

## 2024-06-24 RX ADMIN — ACETAMINOPHEN 650 MG: 325 TABLET, FILM COATED ORAL at 07:55

## 2024-06-24 RX ADMIN — ACETAMINOPHEN 650 MG: 325 TABLET, FILM COATED ORAL at 01:47

## 2024-06-24 RX ADMIN — ACETAMINOPHEN 650 MG: 325 TABLET, FILM COATED ORAL at 13:55

## 2024-06-24 RX ADMIN — DICLOFENAC 2 G: 10 GEL TOPICAL at 21:32

## 2024-06-24 RX ADMIN — SODIUM BICARBONATE 650 MG TABLET 1950 MG: at 07:56

## 2024-06-24 RX ADMIN — METHOCARBAMOL 250 MG: 500 TABLET ORAL at 17:01

## 2024-06-24 RX ADMIN — ERTAPENEM SODIUM 1 G: 1 INJECTION, POWDER, LYOPHILIZED, FOR SOLUTION INTRAMUSCULAR; INTRAVENOUS at 13:55

## 2024-06-24 RX ADMIN — INSULIN ASPART 2 UNITS: 100 INJECTION, SOLUTION INTRAVENOUS; SUBCUTANEOUS at 12:27

## 2024-06-24 RX ADMIN — FERROUS GLUCONATE 324 MG: 324 TABLET ORAL at 09:54

## 2024-06-24 RX ADMIN — COLCHICINE 0.6 MG: 0.6 TABLET ORAL at 09:54

## 2024-06-24 ASSESSMENT — ACTIVITIES OF DAILY LIVING (ADL)
ADLS_ACUITY_SCORE: 41
ADLS_ACUITY_SCORE: 40
ADLS_ACUITY_SCORE: 41
ADLS_ACUITY_SCORE: 40
ADLS_ACUITY_SCORE: 41
ADLS_ACUITY_SCORE: 40
ADLS_ACUITY_SCORE: 41
ADLS_ACUITY_SCORE: 40
ADLS_ACUITY_SCORE: 41
ADLS_ACUITY_SCORE: 40
ADLS_ACUITY_SCORE: 41
ADLS_ACUITY_SCORE: 40
ADLS_ACUITY_SCORE: 40
ADLS_ACUITY_SCORE: 41
ADLS_ACUITY_SCORE: 41

## 2024-06-24 NOTE — PROGRESS NOTES
[facility]  Hospitalist Progress Note   06/24/2024          Assessment and Plan:       Lance Ibrahim is a 79 year old male with history of heart failure with preserved ejection fraction, hypertension, dyslipidemia, CKD, gout, BPH, type 2 diabetes and chronic low back pain for which he is folllowed by [facility] and has an implantable spine stimulator ([employer], MRI conditional 4/2024) in place. He initially presented to [facility] with acute on chronic, intractable pain with RLE radiculopathy. CT concerning for discitis-osteomyelitis at L4-5 with epidural/intraforaminal phlegmon vs abscess at L4-S1 with severe foraminal narrowing. Transferred to [facility] for Neurosurgery evaluation and ID consultation.     Hospitalization on 5/16-5/22/2024 due to severe sepsis related to UTI caused by Klebsiella oxytoca.    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  Acute on chronic back pain with RLE radiculopathy.  Severe spinal stenosis.  --Presented with acute on chronic back pain located on the right side of his back.  Significant tenderness to palpation at approximately the L3-L4 level on the right side with associated RLE radiculopathy. No bladder or bowel incontinence, no saddle anesthesia. Uses a walker for shorter distances and wheelchair for longer distances at baseline. No recent trauma or falls, but did get a trigger point injection by [facility] clinic on 6/14.   *Afebrile since admission, hemodynamically stable.  WBC WNL. CRP 12.4.   *CT lumbar spine as above (review formal report for complete details)   *MRI lumbar spine Discitis osteomyelitis at L4-5. 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 At L4-5, severe spinal stenosis secondary to the epidural abscess. At L5-S1, moderate spinal stenosis secondary to the epidural  abscess.  --Status post IR guided L4-L5 disc aspiration and L4 biopsy. 1 ml of rust colored cloudy fluid aspirated from L4-5 disc space. Large core specimen also obtained from L4 interior endplate  --Started on IV vancomycin on 6/19 post IR guided aspiration.    Blood cultures no growth to date.  Intraoperative biopsy, aspirate - no growth to date.  Noted allergies to penicillin, cephalosporins and sulfa.   --Infectious disease followed - Anticipate at least 6 weeks of IV antibiotics.   --Neurosurgery signed off, no surgical intervention.  Pain management followed.  Appreciate multidisciplinary team input.  Continue intravenous vancomycin (6/19).  PICC line placed 6/23.    Added ertapenem [6/24] for gram-negative coverage given recent Klebsiella bacteremia.  Trend WBC count, fever curve.  --Continue as needed Tylenol, as needed oxycodone.  Continue as needed Flexeril.Note pt utilizes NSAIDs solely at home for pain control.  Minimize narcotic use as able to.  Rehab team following.   SW following for discharge disposition.  Follow-up with infectious disease in clinic in 2 weeks.  Follow-up with neurosurgery in clinic per schedule, plan to repeat MRI in 6 weeks.    Bacteriuria with cultures negative  Recent severe sepsis in the setting of Klebsiella UTI:   Hospitalization on 5/16/2024-5/22/2024 due to severe sepsis related to UTI caused by Klebsiella oxytoca. Patient was on IV Vanco, Levaquin, Cefepime, and Ceftriaxone in the hospital. He was discharged on oral Cefdinir and Levaquin. No current sx of UTI.   -UA with trace blood in urine, few bacteria otherwise unremarkable.   Urine cultures with less than 10,000 mixed fred.  CT A/P on admission negative for renal calculi or hydronephrosis, note left renal cyst.      Uncontrolled type 2 diabetes mellitus with a hemoglobin A1c of 8.0.  Peripheral neuropathy.  PTA on glipizide, semaglutide, as needed gabapentin.  Continue PTA glipizide.  Continue scheduled gabapentin 100  mg 3 times daily.  Started on insulin Lantus on 6/19, continue insulin Lantus 8 units twice daily.    Continue sliding scale insulin.  Monitor blood sugars, optimize regimen.  Hypoglycemia protocol in place.    CAD, subtotal occlusion in small distal LAD.  HFpEF, not in acute exacerbation   Mild aortic stenosis   HTN, HLD.   Denies any chest pain or palpitations or shortness of breath.    Euvolemic.  Blood pressure within normal limits.  Echo 5/16/24 with preserved EF, borderline global hypokinesia, wall motion consistent with conduction abnormality.   Continue PTA lisinopril 10 mg oral daily.  Continue PTA aspirin.  Continue PTA torsemide.  Monitor blood pressures, optimize regimen.     CKD IIIa:   Baseline creatinine 1.3-1.6 more recently. Stable on admission.   Continue PTA sodium bicarbonate.     Normocytic anemia: Baseline Hgb 8-9 range. 10.2 on admission.   Continue PTA iron supplements.  Monitor.     Gout: Not in a flare.  Continue PTA colchicine.     Biliary obstruction with stricture s/p EUS/ERCP 4/30/24 with sphincterotomy and temporary stent placement   *Admission CT notes common bile duct stent with slight dilatation of the intrahepatic bile ducts. A tiny portion of the distal stent appears fractured, of doubtful clinical significance. Pt without abdominal pain.   Continue PTA Ursodiol 300 mg BID    Hx of TIA, 1993, no residual deficits  PAD s/p toe amputation   Continue PTA aspirin.     Baseline R>L DELFINO, mild     Physical deconditioning from medical illness, senile frailty.  [facility] in [place]  Ambulation: walker for short distances, wheelchair for longer distances  Rehab team following, will likely need TCU.    Obesity with a BMI of 30.11.  Increase all-cause mortality and morbidity.  Consider lifestyle modification with diet and exercise as able to.    Sleep apnea:   Does not use CPAP  Recommend sleep studies as outpatient.    Orders Placed This Encounter      Regular Diet Adult      DVT  Prophylaxis: sCDs, ambulate. Pharmacological DVT prophylaxis until neurosurgery, IR input.  Code Status: Full Code  Disposition: Expected discharge pending safe discharge plan in place.    Medically Ready for Discharge: Anticipated Today     Discussed with patient, bedside RN, pain team.  >35 minutes spent by me on the date of service doing chart review, history, exam, documentation & further activities per the note.      Tasha Vivar MD        Interval History:        Patient lying in bed.  Denies any chest pain or palpitation.  Denies any headache or dizziness.  Denies any nausea or vomiting.  Denies any new tingling or numbness.  Denies any new urinary or bowel incontinence.  Per report up with assistance of 1 and gait belt and walker.  Complaining of ongoing pain in his back.  Has been receiving scheduled Flexeril, as needed oxycodone.  Cultures no growth to date.  Social work assisting with transition plans.         Physical Exam:        Physical Exam   Temp:  [98  F (36.7  C)-99  F (37.2  C)] 98.1  F (36.7  C)  Pulse:  [82-97] 95  Resp:  [16] 16  BP: (138-152)/(73-85) 138/85  SpO2:  [92 %-99 %] 99 %    Intake/Output Summary (Last 24 hours) at 6/19/2024 1456  Last data filed at 6/19/2024 0900  Gross per 24 hour   Intake 1000 ml   Output 1250 ml   Net -250 ml     PHYSICAL EXAM  GENERAL: Patient is in no distress. Alert and oriented.  HEART: Regular rate and rhythm. S1S2.   LUNGS: Respirations unlabored  ABDOMEN: Soft, no abdominal tenderness, bowel sounds heard   NEURO: Moving all extremities.  EXTREMITIES: trace pedal edema.  SKIN: Warm, dry. No rash  PSYCHIATRY Cooperative       Medications:        Current Facility-Administered Medications   Medication Dose Route Frequency Provider Last Rate Last Admin    acetaminophen (TYLENOL) tablet 650 mg  650 mg Oral Q6H Nelsy Tee APRN CNP   650 mg at 06/24/24 0755    aspirin EC tablet 81 mg  81 mg Oral Daily Tasha Vivar MD   81 mg at 06/23/24 0811     colchicine (COLCRYS) tablet 0.6 mg  0.6 mg Oral Daily Tasha Vivar MD   0.6 mg at 06/23/24 0810    diclofenac (VOLTAREN) 1 % topical gel 2 g  2 g Topical 4x Daily Nelsy Tee APRN CNP   2 g at 06/23/24 2205    ferrous gluconate (FERGON) tablet 324 mg  324 mg Oral Daily Tsaha Vivar MD   324 mg at 06/23/24 0810    gabapentin (NEURONTIN) capsule 100 mg  100 mg Oral TID Tasha Vivar MD   100 mg at 06/23/24 2158    glipiZIDE (GLUCOTROL XL) 24 hr tablet 2.5 mg  2.5 mg Oral Daily with breakfast Tasha Vivar MD   2.5 mg at 06/23/24 0811    insulin aspart (NovoLOG) injection (RAPID ACTING)  1-3 Units Subcutaneous TID AC Socorro Aceves MD   1 Units at 06/24/24 0756    insulin aspart (NovoLOG) injection (RAPID ACTING)  1-3 Units Subcutaneous At Bedtime Socorro Aceves MD   1 Units at 06/22/24 2135    insulin glargine (LANTUS PEN) injection 6 Units  6 Units Subcutaneous BID Tasha Vivar MD   6 Units at 06/23/24 2202    lactobacillus rhamnosus (GG) (CULTURELL) capsule 2 capsule  2 capsule Oral Daily Tasha Vivar MD   2 capsule at 06/23/24 1236    lisinopril (ZESTRIL) tablet 10 mg  10 mg Oral Daily Tasha Vivar MD   10 mg at 06/23/24 0810    methocarbamol (ROBAXIN) half-tab 250 mg  250 mg Oral TID Nelsy Tee APRN CNP   250 mg at 06/23/24 2159    sodium bicarbonate tablet 1,950 mg  1,950 mg Oral TID Tasha Vivar MD   1,950 mg at 06/24/24 0756    sodium chloride (PF) 0.9% PF flush 10-40 mL  10-40 mL Intracatheter Q7 Days Tsering Sorenson PA-C        sodium chloride (PF) 0.9% PF flush 3 mL  3 mL Intracatheter Q8H Muthyala, Abijah Hemraj, MD   3 mL at 06/24/24 0451    torsemide (DEMADEX) tablet 10 mg  10 mg Oral Daily Tasha Vivar MD   10 mg at 06/23/24 1505    ursodiol (ACTIGALL) capsule 300 mg  300 mg Oral BID Tasha Vivar MD   300 mg at 06/23/24 9867    vancomycin (VANCOCIN) 1,500 mg in 0.9% NaCl 250 mL intermittent infusion  1,500 mg  Intravenous Q24H Tasha Vivar  mL/hr at 06/19/24 1834 1,500 mg at 06/23/24 1807     Current Facility-Administered Medications   Medication Dose Route Frequency Provider Last Rate Last Admin    acetaminophen (TYLENOL) tablet 650 mg  650 mg Oral Q4H PRN Socorro Aceves MD   650 mg at 06/19/24 0830    Or    acetaminophen (TYLENOL) Suppository 650 mg  650 mg Rectal Q4H PRN Socorro Aceves MD        calcium carbonate (TUMS) chewable tablet 1,000 mg  1,000 mg Oral 4x Daily PRN Socorro Aceves MD        glucose gel 15-30 g  15-30 g Oral Q15 Min PRN Socorro Aceves MD        Or    dextrose 50 % injection 25-50 mL  25-50 mL Intravenous Q15 Min PRN Socorro Aceves MD        Or    glucagon injection 1 mg  1 mg Subcutaneous Q15 Min PRN Socorro Aceves MD        famotidine (PEPCID) tablet 20 mg  20 mg Oral BID PRN Tasha Vivar MD        fluticasone (FLONASE) 50 MCG/ACT spray 1-2 spray  1-2 spray Nasal Daily PRN Tasha Vivar MD        lidocaine (LMX4) cream   Topical Q1H PRN Socorro Aceves MD        lidocaine (XYLOCAINE) 5 % ointment   Topical TID PRN Nelsy Tee APRN CNP   Given at 06/22/24 1807    lidocaine 1 % 0.1-1 mL  0.1-1 mL Other Q1H PRN Socorro Aceves MD        lidocaine 1 % 0.1-5 mL  0.1-5 mL Other Q1H PRN Tsering Sorenson PA-C   1 mL at 06/23/24 0908    loratadine (CLARITIN) tablet 10 mg  10 mg Oral Daily PRN Tasha Vivar MD        naloxone (NARCAN) injection 0.2 mg  0.2 mg Intravenous Q2 Min PRN Socorro Aceves MD        Or    naloxone (NARCAN) injection 0.4 mg  0.4 mg Intravenous Q2 Min PRN Socorro Aceves MD        Or    naloxone (NARCAN) injection 0.2 mg  0.2 mg Intramuscular Q2 Min PRN Socorro Aceves MD        Or    naloxone (NARCAN) injection 0.4 mg  0.4 mg Intramuscular Q2 Min PRN Socorro Aceves MD        nortriptyline (PAMELOR) capsule 20 mg  20 mg Oral At Bedtime  PRN Tahsa Vivar MD        ondansetron (ZOFRAN ODT) ODT tab 4 mg  4 mg Oral Q6H PRN Socorro Aceves MD        Or    ondansetron (ZOFRAN) injection 4 mg  4 mg Intravenous Q6H PRN Socorro Aceves MD        oxyCODONE (ROXICODONE) tablet 5 mg  5 mg Oral Q4H PRN Lida Bowers PA-C   5 mg at 06/24/24 0756    senna-docusate (SENOKOT-S/PERICOLACE) 8.6-50 MG per tablet 1 tablet  1 tablet Oral BID PRN Socorro Aceves MD        Or    senna-docusate (SENOKOT-S/PERICOLACE) 8.6-50 MG per tablet 2 tablet  2 tablet Oral BID PRN Socorro Aceves MD        sodium chloride (PF) 0.9% PF flush 10-20 mL  10-20 mL Intracatheter q1 min prn Tsering Sorenson PA-C        sodium chloride (PF) 0.9% PF flush 10-40 mL  10-40 mL Intracatheter Q1H PRN Tsering Sorenson PA-C        sodium chloride (PF) 0.9% PF flush 3 mL  3 mL Intracatheter q1 min prn Socorro Aceves MD   3 mL at 06/18/24 1825            Data:      All new lab and imaging data was reviewed.

## 2024-06-24 NOTE — PLAN OF CARE
Goal Outcome Evaluation:       Shift Summary 1900-0730    Admitting Diagnosis: Back pain   Orientation: A&Ox4,Vitals: VSS RA, Pain management: Scheduled tylenol given, denies pain  IV Access/drains: PIV SL, biopsy site dressing is CDI  Diet: Regular diet  Mobility: A X1 GB&W  GI/: Voiding in urinal at bedside. No BM this shift.  Skin: Scattered bruising, pale. Blanchable redness coccyx, mepilex in place.   Discharge Plan: Probably today to TCU

## 2024-06-24 NOTE — PLAN OF CARE
Goal Outcome Evaluation:      Plan of Care Reviewed With: patient    Overall Patient Progress: improvingOverall Patient Progress: improving     Shift Summary [id number]    Admitting Diagnosis: Back pain   Vitals VSS  Pain 5/10. Taking Oxy PRN. Last dose 0756  A&Ox4  Voiding -yes, via urinal  Mobility -Ass/1/wlk/gb  Tele -N/A  CMS- Intact  Lung Sounds Clear on 0L via RA  GI- continent  Dressing- Lumbar aspiration site CDI    Orders Placed This Encounter      Regular Diet Adult      Diet       Plan: Possible discharge today.

## 2024-06-24 NOTE — PROGRESS NOTES
"[facility] ACUTE INPATIENT PAIN SERVICE    [facility], [facility], [facility], [facility], [place]   PAIN FOLLOW UP    Requesting provider: Lin Lara PA-C  Reason for consult: discitis osteomyelitis with ongoing pain      Assessment/Plan:  Lance Ibrahim is a 79 year old male who was admitted on 6/18/2024. He initially presented to [facility] with acute on chronic intractable pain with RLE radiculopathy.  History of HFpEF, CKD, gout, chronic low back pain with spinal stimulator, chronic pain syndrome.        shows routine fills of gabapentin, sporadic opioid prescriptions.   -06/06/2024 gabapentin 300 mg #30 for 10 days  -05/28/2024 oxycodone 5 mg #18 for 5 days  -05/23/2024 oxycodone 5 mg #18 for 5 days  -05/22/2024 oxycodone 5 mg tab #6 for 2 days  -05/22/2024 gabapentin 300 mg #30 for 10 days  -03/28/2024 oxycodone 5 mg #15 for 3 days     He is currently followed by [facility] pain clinic and has a spinal stimulator in place which was implanted on April 3.  So far this has been helpful in managing his chronic neuropathy pain.     Infectious disease consulted, consult with IR for possible aspiration/biopsy for cultures, currently holding off on antibiotics until blood cultures return or fevers develop.      Discussed with neurosurgery and patient at the bedside 6/19, current plan is for antibiotic therapy and hold off on surgery for now.  Possibility of spinal cord stimulator removal mentioned if needed to clear the infection.  Per neurosurgery note, signing off at this time.      Lumbar aspiration/biopsy was completed 6/19, 1 mL rust colored cloudy fluid aspirated from L4-5 disc space and sent to lab.  Reviewed ID note, continue with IV vanco, further reccomendations to follow once final culture results return.      Lance reports that his pain is primarily located right side of the mid back and reports it feels like a \"constant ice pick\".  He woke up on Saturday around 1 AM with the ice pick pain and it has " "been steady ever since.  He reports when he is lying down and not moving the pain seems to be manageable and is okay.  If he is sitting or standing up pain significantly increases.  Additionally, the pain in his back seems to increase with movement of the lower extremities.  Denies pain in the legs themselves.  Denies numbness or tingling.  Pain has consistently been a 6.5/10.     He also endorses chronic left knee pain, had replacement scheduled for May however this was pushed due to UTI.  Now rescheduled for September 19 at [facility].  He has had sporadic opioid use, steroid injections for the knee.  He has difficulty with walking at baseline related to the knee pain.     Lance reports during the previous hospitalization he was given MiraLAX and he did not know and reported this cause significantly loose stools.  Would not like MiraLAX this admission.  He is aware he has as needed senna ordered, did not want it scheduled at this time.     Lance states he was previously taking gabapentin for his neuropathy but stopped taking it after his spinal stimulator was implanted so he was wondering why it was reordered. Discussed that this could be helpful with his pain he is experiencing now, so he would like to continue taking it.     Per hospitalist note, PICC line was placed 6/23 and anticipating at least 6 weeks of IV antibiotics. Follow up with ID in clinic in 2 weeks, neurosurgery in clinic for repeat MRI in 6 weeks.     Today, Lance's primary area of concern related to his pain is his left knee and hip.  The left knee is a chronic pain, he likes to use diclofenac gel which is very helpful.  He states he feels like \"his infection pain is improving\", right-sided back pain is getting better.  In general, he reports his pain is \" above annoying, but not intolerable\".  Current medication regimen is helpful.     In the last 24 hours, Lance has received: 2 (5mg) oxycodone  Total MME = 15   Opioid use stable, pain " "improving.     Adjuvants: 5 (650mg) tylenol, 3 (250mg) robaxin, 3 (100mg) gabapentin     PLAN: Acute right-sided back pain secondary to discitis osteomyelitis at L4-L5.  Per neurosurgery, no urgent surgical intervention at this time, antibiotics per infectious disease.  Pain is improving and managed well on current medication regimen.  Discharge pending safe plan.  Discussed with hospitalist, pain team will sign off.  Reconsult if needed.  Multimodal Medication Therapy:   Adjuvants:   Robaxin 250 mg 3 times daily   lidocaine 3 times daily prn  diclofenac 4 times daily  acetaminophen 650 mg every 6 hours  Gabapentin 100mg tid - ordered by hospitalist yesterday  Acetaminophen 650mg q4h prn   No po or iv NSAIDs due to cardiac history, low hemoglobin, CKD  Opioids: 5mg oxycodone q4h prn   IV dilaudid discontinued  Non-medication interventions- Ice, heat prn   Constipation Prophylaxis- prn senna   Follow up /Discharge Recommendations - We recommend prescribing the following at the time of discharge:    Robaxin 250mg tid prn   Diclofenac qid   Acetaminophen 650mg q6h   Gabapentin 100mg tid   5mg oxycodone q6h prn     Subjective:  Denies nausea, vomiting, diarrhea, constipation, chest pain, shortness of breath.  Endorsing 4/10 pain, primarily related to chronic knee pain.  \"Infection pain\" is improving.       History   Drug Use No         Tobacco Use      Smoking status: Former        Packs/day: 0.00        Types: Cigarettes        Quit date: 3/17/1993        Years since quittin.2      Smokeless tobacco: Never      Objective:  Vital signs in last 24 hours:  B/P: 139/78, T: 98, P: 82, R: 16   Blood pressure 139/78, pulse 82, temperature 98  F (36.7  C), temperature source Oral, resp. rate 16, SpO2 92%.    Review of Systems:   As per subjective, all others negative.    Physical Exam  General: in no apparent distress, laying in bed appears comfortable   Resp: Respirations unlabored, on room air   Skin: Warm and " dry  Extremities: Able to move all four extremities spontaneously   Psych: Normal affect, pleasant   Neuro: Alert and oriented x3       Imaging:  Personally Reviewed.    Results for orders placed or performed during the hospital encounter of 06/18/24   MR Lumbar Spine w/o & w Contrast    Impression    IMPRESSION:  1.  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  2.  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  3.  At L4-5, severe spinal stenosis secondary to the epidural abscess.  4.  At L5-S1, moderate spinal stenosis secondary to the epidural abscess.        Lab Results:  Personally Reviewed.   Last Comprehensive Metabolic Panel:  Sodium   Date Value Ref Range Status   06/20/2024 136 135 - 145 mmol/L Final     Comment:     Reference intervals for this test were updated on 09/26/2023 to more accurately reflect our healthy population. There may be differences in the flagging of prior results with similar values performed with this method. Interpretation of those prior results can be made in the context of the updated reference intervals.    04/29/2021 139 133 - 144 mmol/L Final     Potassium   Date Value Ref Range Status   06/21/2024 4.1 3.4 - 5.3 mmol/L Final   08/27/2022 4.6 3.5 - 5.0 mmol/L Final   04/29/2021 4.7 3.4 - 5.3 mmol/L Final     Chloride   Date Value Ref Range Status   06/20/2024 99 98 - 107 mmol/L Final   08/27/2022 108 (H) 98 - 107 mmol/L Final   04/29/2021 107 94 - 109 mmol/L Final     Carbon Dioxide   Date Value Ref Range Status   04/29/2021 27 20 - 32 mmol/L Final     Carbon Dioxide (CO2)   Date Value Ref Range Status   06/20/2024 23 22 - 29 mmol/L Final   08/27/2022 22 22 - 31 mmol/L Final     Anion Gap   Date Value Ref Range Status   06/20/2024 14 7 - 15 mmol/L Final   08/27/2022 8 5 - 18 mmol/L Final   04/29/2021 5 3 - 14 mmol/L Final     Glucose   Date Value Ref Range Status   08/27/2022 131 (H) 70 - 125 mg/dL Final  "  04/29/2021 212 (H) 70 - 99 mg/dL Final     GLUCOSE BY METER POCT   Date Value Ref Range Status   06/24/2024 146 (H) 70 - 99 mg/dL Final     Urea Nitrogen   Date Value Ref Range Status   06/20/2024 21.5 8.0 - 23.0 mg/dL Final   08/27/2022 16 8 - 28 mg/dL Final   04/29/2021 27 7 - 30 mg/dL Final     Creatinine   Date Value Ref Range Status   06/23/2024 1.18 (H) 0.67 - 1.17 mg/dL Final   04/29/2021 1.19 0.66 - 1.25 mg/dL Final     GFR Estimate   Date Value Ref Range Status   06/23/2024 62 >60 mL/min/1.73m2 Final     Comment:     eGFR calculated using 2021 CKD-EPI equation.   04/29/2021 59 (L) >60 mL/min/[1.73_m2] Final     Comment:     Non  GFR Calc  Starting 12/18/2018, serum creatinine based estimated GFR (eGFR) will be   calculated using the Chronic Kidney Disease Epidemiology Collaboration   (CKD-EPI) equation.       GFR, ESTIMATED POCT   Date Value Ref Range Status   12/03/2021 15 (L) >60 mL/min/1.73m2 Final     Calcium   Date Value Ref Range Status   06/20/2024 9.2 8.8 - 10.2 mg/dL Final   04/29/2021 9.3 8.5 - 10.1 mg/dL Final        UA: No results found for: \"UAMP\", \"UBARB\", \"BENZODIAZEUR\", \"UCANN\", \"UCOC\", \"OPIT\", \"UPCP\"       Please see A&P for additional details of medical decision making.  MANAGEMENT DISCUSSED with the following over the past 24 hours:   -Hospitalist: updated on changes made to plan, pain team signing off    NOTE(S)/MEDICAL RECORDS REVIEWED over the past 24 hours:   -Hospitalist   -nursing   Tests REVIEWED in the past 24 hours:  - CMP  Medical complexity over the past 24 hours:  - Parenteral (IV) CONTROLLED SUBSTANCES ordered  - Prescription DRUG MANAGEMENT performed      Nelsy WOODRUFF FNP-BC  Acute Care Pain Management Program   Hours of pain coverage Mon-Fri [phone], afterhours please call the house officer   [facility] ([place], [place], [place], [place])   Page via [employer]- Click HERE to page Nelsy or Vocera text web console -Click for Vocera            "

## 2024-06-24 NOTE — PROGRESS NOTES
[facility]    Infectious Disease Progress Note    Date of Service : 06/24/2024    Assessment:  78 yo with PMH of  heart failure with preserved ejection fraction, hypertension, dyslipidemia, CKD, gout, BPH, type 2 diabetes and chronic low back pain for which he is folllowed by [facility] and has an implantable spine stimulator (Medtronic, MRI conditional 4/2024) in place.   Presented to [facility] with acute on chronic, intractable pain with RLE radiculopathy.   CT was concerning for discitis-osteomyelitis at L4-5 with epidural/intraforaminal phlegmon vs abscess at L4-S1 with severe foraminal narrowing. Transferred to [facility] for Neurosurgery evaluation and ID consultation.   Of note, recent history of urosepsis with klebsiella in the blood and urine treated with antibiotics   MRI with discitis osteomyelitis at L4-5, epidural abscess in the ventral epidural space, craniocaudal extension of 5 cm (from inferior L4 through inferior S1 level) and severe spinal stenosis at L4-5 from abscess.  S/p L4-5 disc aspiration and L4 biopsy 6/19 with cultures no growth to date, gram stain with 3+ WBC.  Blood cultures no growth to date.   Allergies to PCN (anaphylaxis), Cephalexin (rash - required hospitalization), Cefazolin (rash), and Sulfa (swelling of face and hands, itching)     Recommendations:  Continue Vancomycin  Add Ertapenem for gram negative coverage given recent klebsiella bacteremia and risk of spinal seeding  Will need arrangements for long term IV antibiotic therapy  OPIV antibiotic orders placed in [facility] for discharge  Follow with Tsering Sorenson in 2weeks    Patricia Danielle MD    Interval History   Patient was seen and examined, chart reviewed  Feels better, back pain is a little better  , tolerating antibiotics without side effects  Cxs are with no growth so far    Physical Exam   Temp: 98.1  F (36.7  C) Temp src: Oral BP: 138/85 Pulse: 95   Resp: 16 SpO2: 99 % O2 Device: None (Room air)    There  were no vitals filed for this visit.  Vital Signs with Ranges  Temp:  [98  F (36.7  C)-99  F (37.2  C)] 98.1  F (36.7  C)  Pulse:  [82-97] 95  Resp:  [16] 16  BP: (138-152)/(73-85) 138/85  SpO2:  [92 %-99 %] 99 %    Constitutional: Awake, alert, cooperative, no apparent distress  Lungs: Clear to auscultation bilaterally, no crackles or wheezing  Cardiovascular: Regular rate and rhythm, normal S1 and S2, and no murmur noted  Abdomen: Normal bowel sounds, soft, non-distended, non-tender  Skin: No rashes, no cyanosis, no edema  Other:    Medications   Current Facility-Administered Medications   Medication Dose Route Frequency Provider Last Rate Last Admin     Current Facility-Administered Medications   Medication Dose Route Frequency Provider Last Rate Last Admin    acetaminophen (TYLENOL) tablet 650 mg  650 mg Oral Q6H Nelsy Tee APRN CNP   650 mg at 06/24/24 0755    aspirin EC tablet 81 mg  81 mg Oral Daily Tasha Vivar MD   81 mg at 06/24/24 0954    colchicine (COLCRYS) tablet 0.6 mg  0.6 mg Oral Daily Tasha Vivar MD   0.6 mg at 06/24/24 0954    diclofenac (VOLTAREN) 1 % topical gel 2 g  2 g Topical 4x Daily Nelsy Tee APRN CNP   2 g at 06/24/24 0956    ferrous gluconate (FERGON) tablet 324 mg  324 mg Oral Daily Tasha Vivar MD   324 mg at 06/24/24 0954    gabapentin (NEURONTIN) capsule 100 mg  100 mg Oral TID Tasha Vivar MD   100 mg at 06/24/24 0954    glipiZIDE (GLUCOTROL XL) 24 hr tablet 2.5 mg  2.5 mg Oral Daily with breakfast Tasha Vivar MD   2.5 mg at 06/24/24 0954    insulin aspart (NovoLOG) injection (RAPID ACTING)  1-3 Units Subcutaneous TID AC Socorro Aceves MD   1 Units at 06/24/24 0756    insulin aspart (NovoLOG) injection (RAPID ACTING)  1-3 Units Subcutaneous At Bedtime Socorro Aceves MD   1 Units at 06/22/24 2130    insulin glargine (LANTUS PEN) injection 6 Units  6 Units Subcutaneous BID Tasha Vivar MD   6 Units at 06/24/24 1000     lactobacillus rhamnosus (GG) (CULTURELL) capsule 2 capsule  2 capsule Oral Daily Tasha Vivar MD   2 capsule at 06/23/24 1236    lisinopril (ZESTRIL) tablet 10 mg  10 mg Oral Daily Tasha Vivar MD   10 mg at 06/24/24 0954    methocarbamol (ROBAXIN) half-tab 250 mg  250 mg Oral TID Nelsy Tee APRN CNP   250 mg at 06/24/24 0954    sodium bicarbonate tablet 1,950 mg  1,950 mg Oral TID Tasha Vivar MD   1,950 mg at 06/24/24 0756    sodium chloride (PF) 0.9% PF flush 10-40 mL  10-40 mL Intracatheter Q7 Days Tsering Sorenson PA-C        sodium chloride (PF) 0.9% PF flush 3 mL  3 mL Intracatheter Q8H Socorro Aceves MD   3 mL at 06/24/24 0451    torsemide (DEMADEX) tablet 10 mg  10 mg Oral Daily Tasha Vivar MD   10 mg at 06/24/24 0955    ursodiol (ACTIGALL) capsule 300 mg  300 mg Oral BID Tasha Vivar MD   300 mg at 06/24/24 0954    vancomycin (VANCOCIN) 1,500 mg in 0.9% NaCl 250 mL intermittent infusion  1,500 mg Intravenous Q24H Tasha Vivar  mL/hr at 06/19/24 1834 1,500 mg at 06/23/24 1807       Data   All microbiology laboratory data reviewed.  Recent Labs   Lab Test 06/24/24  0739 06/20/24  0829 06/19/24  0710 06/18/24  0845 06/17/24  1758   WBC  --   --  7.8 8.2 10.2   HGB 9.4* 10.9* 9.7* 9.7* 10.2*   HCT  --   --  30.8* 31.4* 32.6*   MCV  --   --  93 94 90   PLT  --   --  289 284 348     Recent Labs   Lab Test 06/24/24  0739 06/23/24  0736 06/22/24  0851   CR 1.14 1.18* 1.26*     Recent Labs   Lab Test 06/18/24  1029   SED 69*        Microbiology  06/19/2024 1548 06/24/2024 0721 Biopsy Aerobic Bacterial Culture Routine With Gram Stain [[id number]]   Biopsy from Spine, Lumbar    Final result Component Value   Culture No Growth   Gram Stain Result No organisms seen    3+ WBC seen    Predominantly PMNs             06/19/2024 1548 06/23/2024 2006 Anaerobic Bacterial Culture Routine [[id number]]   Aspirate from Spine, Lumbar    Preliminary result Component  Value   Culture No anaerobic organisms isolated after 4 days P             06/19/2024 1548 06/24/2024 0721 Aspirate Aerobic Bacterial Culture Routine With Gram Stain [[id number]]   Aspirate from Spine, Lumbar    Final result Component Value   Culture No Growth   Gram Stain Result No organisms seen    3+ WBC seen    Predominantly PMNs             06/19/2024 1541 06/23/2024 2016 Anaerobic Bacterial Culture Routine [[id number]]    Biopsy from Spine, Lumbar    Preliminary result Component Value   Culture No anaerobic organisms isolated after 4 days P             06/18/2024 1038 06/23/2024 1332 Blood Culture Arm, Right [[id number]]   Blood from Arm, Right    Final result Component Value   Culture No Growth             06/18/2024 1029 06/23/2024 1332 Blood Culture Arm, Left [[id number]]   Blood from Arm, Left    Final result Component Value   Culture No Growth             06/17/2024 1612 06/19/2024 0547 Urine Culture Aerobic Bacterial [[id number]]   Urine, Clean Catch    Final result Component Value   Culture <10,000 CFU/mL Mixture of Urogenital Joyce

## 2024-06-25 ENCOUNTER — MEDICAL CORRESPONDENCE (OUTPATIENT)
Dept: HEALTH INFORMATION MANAGEMENT | Facility: CLINIC | Age: 79
End: 2024-06-25

## 2024-06-25 ENCOUNTER — APPOINTMENT (OUTPATIENT)
Dept: PHYSICAL THERAPY | Facility: CLINIC | Age: 79
DRG: 477 | End: 2024-06-25
Attending: STUDENT IN AN ORGANIZED HEALTH CARE EDUCATION/TRAINING PROGRAM
Payer: COMMERCIAL

## 2024-06-25 VITALS
TEMPERATURE: 97.5 F | RESPIRATION RATE: 18 BRPM | SYSTOLIC BLOOD PRESSURE: 136 MMHG | DIASTOLIC BLOOD PRESSURE: 89 MMHG | OXYGEN SATURATION: 97 % | HEART RATE: 109 BPM

## 2024-06-25 LAB
GLUCOSE BLDC GLUCOMTR-MCNC: 126 MG/DL (ref 70–99)
GLUCOSE BLDC GLUCOMTR-MCNC: 132 MG/DL (ref 70–99)
GLUCOSE BLDC GLUCOMTR-MCNC: 135 MG/DL (ref 70–99)
GLUCOSE BLDC GLUCOMTR-MCNC: 229 MG/DL (ref 70–99)

## 2024-06-25 PROCEDURE — 250N000013 HC RX MED GY IP 250 OP 250 PS 637: Performed by: HOSPITALIST

## 2024-06-25 PROCEDURE — 99233 SBSQ HOSP IP/OBS HIGH 50: CPT | Performed by: SPECIALIST

## 2024-06-25 PROCEDURE — 99239 HOSP IP/OBS DSCHRG MGMT >30: CPT | Performed by: HOSPITALIST

## 2024-06-25 PROCEDURE — 97530 THERAPEUTIC ACTIVITIES: CPT | Mod: GP | Performed by: PHYSICAL THERAPY ASSISTANT

## 2024-06-25 PROCEDURE — 250N000013 HC RX MED GY IP 250 OP 250 PS 637

## 2024-06-25 PROCEDURE — 250N000011 HC RX IP 250 OP 636: Mod: JZ | Performed by: SPECIALIST

## 2024-06-25 PROCEDURE — 250N000013 HC RX MED GY IP 250 OP 250 PS 637: Performed by: PHYSICIAN ASSISTANT

## 2024-06-25 RX ORDER — OXYCODONE HYDROCHLORIDE 5 MG/1
2.5-5 TABLET ORAL EVERY 6 HOURS PRN
Qty: 12 TABLET | Refills: 0 | Status: SHIPPED | OUTPATIENT
Start: 2024-06-25 | End: 2024-07-04

## 2024-06-25 RX ORDER — METHOCARBAMOL 500 MG/1
500 TABLET, FILM COATED ORAL 3 TIMES DAILY PRN
Qty: 10 TABLET | Refills: 0 | Status: SHIPPED | OUTPATIENT
Start: 2024-06-25 | End: 2024-07-10

## 2024-06-25 RX ADMIN — ACETAMINOPHEN 650 MG: 325 TABLET, FILM COATED ORAL at 08:57

## 2024-06-25 RX ADMIN — GLIPIZIDE 2.5 MG: 2.5 TABLET, FILM COATED, EXTENDED RELEASE ORAL at 08:57

## 2024-06-25 RX ADMIN — SODIUM BICARBONATE 650 MG TABLET 1950 MG: at 12:27

## 2024-06-25 RX ADMIN — OXYCODONE HYDROCHLORIDE 5 MG: 5 TABLET ORAL at 14:07

## 2024-06-25 RX ADMIN — ASPIRIN 81 MG: 81 TABLET, COATED ORAL at 08:56

## 2024-06-25 RX ADMIN — ACETAMINOPHEN 650 MG: 325 TABLET, FILM COATED ORAL at 14:07

## 2024-06-25 RX ADMIN — GABAPENTIN 100 MG: 100 CAPSULE ORAL at 08:57

## 2024-06-25 RX ADMIN — TORSEMIDE 20 MG: 20 TABLET ORAL at 08:57

## 2024-06-25 RX ADMIN — METHOCARBAMOL 250 MG: 500 TABLET ORAL at 08:57

## 2024-06-25 RX ADMIN — SODIUM BICARBONATE 650 MG TABLET 1950 MG: at 08:57

## 2024-06-25 RX ADMIN — COLCHICINE 0.6 MG: 0.6 TABLET ORAL at 08:57

## 2024-06-25 RX ADMIN — ERTAPENEM SODIUM 1 G: 1 INJECTION, POWDER, LYOPHILIZED, FOR SOLUTION INTRAMUSCULAR; INTRAVENOUS at 14:07

## 2024-06-25 RX ADMIN — URSODIOL 300 MG: 300 CAPSULE ORAL at 08:57

## 2024-06-25 RX ADMIN — FERROUS GLUCONATE 324 MG: 324 TABLET ORAL at 08:57

## 2024-06-25 RX ADMIN — DICLOFENAC 2 G: 10 GEL TOPICAL at 08:58

## 2024-06-25 RX ADMIN — Medication 2 CAPSULE: at 12:27

## 2024-06-25 RX ADMIN — LISINOPRIL 10 MG: 10 TABLET ORAL at 08:56

## 2024-06-25 RX ADMIN — INSULIN ASPART 1 UNITS: 100 INJECTION, SOLUTION INTRAVENOUS; SUBCUTANEOUS at 12:28

## 2024-06-25 ASSESSMENT — ACTIVITIES OF DAILY LIVING (ADL)
ADLS_ACUITY_SCORE: 40

## 2024-06-25 NOTE — PROGRESS NOTES
[facility]    Infectious Disease Progress Note    Date of Service : 06/25/2024     Assessment:  78 yo with PMH of  heart failure with preserved ejection fraction, hypertension, dyslipidemia, CKD, gout, BPH, type 2 diabetes and chronic low back pain for which he is folllowed by [facility] and has an implantable spine stimulator (Medtronic, MRI conditional 4/2024) in place.   Presented to [facility] with acute on chronic, intractable pain with RLE radiculopathy.   CT was concerning for discitis-osteomyelitis at L4-5 with epidural/intraforaminal phlegmon vs abscess at L4-S1 with severe foraminal narrowing. Transferred to [facility] for Neurosurgery evaluation and ID consultation.   Of note, recent history of urosepsis with klebsiella in the blood and urine treated with antibiotics   MRI with discitis osteomyelitis at L4-5, epidural abscess in the ventral epidural space, craniocaudal extension of 5 cm (from inferior L4 through inferior S1 level) and severe spinal stenosis at L4-5 from abscess.  S/p L4-5 disc aspiration and L4 biopsy 6/19 with cultures no growth to date, gram stain with 3+ WBC.  Blood cultures no growth to date.   Allergies to PCN (anaphylaxis), Cephalexin (rash - required hospitalization), Cefazolin (rash), and Sulfa (swelling of face and hands, itching)     Recommendations:  Continue Vancomycin and Ertapenem (added due to recent klebsiella bacteremia with concern for spinal seeding )  Follow up imaging has been scheduled  OPIV antibiotic orders placed in [facility] for discharge for 6 weeks -Vancomycin until 7/31, Ertapenem until 8/5  Follow with Tsering Sorenson in 2-3 weeks  Discharge is planned today, ID will sign off    Patricia Danielle MD    Interval History   Feels better, back pain improving, tolerating antibiotics without side effects    Physical Exam   Temp: 98.3  F (36.8  C) Temp src: Oral BP: (!) 145/86 Pulse: 88   Resp: 18 SpO2: 91 % O2 Device: None (Room air)    There were no  vitals filed for this visit.  Vital Signs with Ranges  Temp:  [97.8  F (36.6  C)-98.3  F (36.8  C)] 98.3  F (36.8  C)  Pulse:  [86-95] 88  Resp:  [16-18] 18  BP: (132-145)/(71-86) 145/86  SpO2:  [91 %-100 %] 91 %    Constitutional: Awake, alert, cooperative, no apparent distress  Lungs: Clear to auscultation bilaterally, no crackles or wheezing  Cardiovascular: Regular rate and rhythm, normal S1 and S2, and no murmur noted  Abdomen: Normal bowel sounds, soft, non-distended, non-tender  Skin: No rashes, no cyanosis, no edema  Other:    Medications   Current Facility-Administered Medications   Medication Dose Route Frequency Provider Last Rate Last Admin     Current Facility-Administered Medications   Medication Dose Route Frequency Provider Last Rate Last Admin    acetaminophen (TYLENOL) tablet 650 mg  650 mg Oral Q6H Nelsy Tee APRN CNP   650 mg at 06/25/24 0857    aspirin EC tablet 81 mg  81 mg Oral Daily Tasha Vivar MD   81 mg at 06/25/24 0856    colchicine (COLCRYS) tablet 0.6 mg  0.6 mg Oral Daily Tasha Vivar MD   0.6 mg at 06/25/24 0857    ertapenem (INVanz) 1 g vial to attach to  mL bag  1 g Intravenous Q24H Patricia Danielle MD   1 g at 06/24/24 1355    ferrous gluconate (FERGON) tablet 324 mg  324 mg Oral Daily Tasha Vivar MD   324 mg at 06/25/24 0857    gabapentin (NEURONTIN) capsule 100 mg  100 mg Oral TID Tasha Vivar MD   100 mg at 06/25/24 0857    glipiZIDE (GLUCOTROL XL) 24 hr tablet 2.5 mg  2.5 mg Oral Daily with breakfast Tasha Vivar MD   2.5 mg at 06/25/24 0857    insulin aspart (NovoLOG) injection (RAPID ACTING)  1-3 Units Subcutaneous TID  Socorro Aceves MD   2 Units at 06/24/24 1227    insulin aspart (NovoLOG) injection (RAPID ACTING)  1-3 Units Subcutaneous At Bedtime Socorro Aceves MD   1 Units at 06/22/24 2135    insulin glargine (LANTUS PEN) injection 8 Units  8 Units Subcutaneous BID Tasha Vivar MD   8 Units at 06/25/24 0900     lactobacillus rhamnosus (GG) (CULTURELL) capsule 2 capsule  2 capsule Oral Daily Tasha Vivar MD   2 capsule at 06/24/24 1226    lisinopril (ZESTRIL) tablet 10 mg  10 mg Oral Daily Tasha Vivar MD   10 mg at 06/25/24 0856    methocarbamol (ROBAXIN) half-tab 250 mg  250 mg Oral TID Nelsy Tee APRN CNP   250 mg at 06/25/24 0857    sodium bicarbonate tablet 1,950 mg  1,950 mg Oral TID Tasha Vivar MD   1,950 mg at 06/25/24 0857    sodium chloride (PF) 0.9% PF flush 10-40 mL  10-40 mL Intracatheter Q7 Days Tsering Sorenson PA-C        sodium chloride (PF) 0.9% PF flush 3 mL  3 mL Intracatheter Q8H Socorro Aceves MD   3 mL at 06/24/24 2039    torsemide (DEMADEX) tablet 20 mg  20 mg Oral Daily Tasha Vivar MD   20 mg at 06/25/24 0857    ursodiol (ACTIGALL) capsule 300 mg  300 mg Oral BID Tasha Vivar MD   300 mg at 06/25/24 0857    vancomycin (VANCOCIN) 1,500 mg in 0.9% NaCl 250 mL intermittent infusion  1,500 mg Intravenous Q24H Tasha Vivar  mL/hr at 06/19/24 1834 1,500 mg at 06/24/24 1757       Data   All microbiology laboratory data reviewed.  Recent Labs   Lab Test 06/24/24  0739 06/20/24  0829 06/19/24  0710 06/18/24  0845 06/17/24  1758   WBC  --   --  7.8 8.2 10.2   HGB 9.4* 10.9* 9.7* 9.7* 10.2*   HCT  --   --  30.8* 31.4* 32.6*   MCV  --   --  93 94 90   PLT  --   --  289 284 348     Recent Labs   Lab Test 06/24/24  0739 06/23/24  0736 06/22/24  0851   CR 1.14 1.18* 1.26*     Recent Labs   Lab Test 06/18/24  1029   SED 69*     Microbiology         06/19/2024 1548 06/24/2024 0721 Biopsy Aerobic Bacterial Culture Routine With Gram Stain [[id number]]   Biopsy from Spine, Lumbar    Final result Component Value   Culture No Growth   Gram Stain Result No organisms seen     3+ WBC seen     Predominantly PMNs               06/19/2024 1548 06/23/2024 2006 Anaerobic Bacterial Culture Routine [[id number]]   Aspirate from Spine, Lumbar    Preliminary result  Component Value   Culture No anaerobic organisms isolated after 4 days P               06/19/2024 1548 06/24/2024 0721 Aspirate Aerobic Bacterial Culture Routine With Gram Stain [[id number]]   Aspirate from Spine, Lumbar    Final result Component Value   Culture No Growth   Gram Stain Result No organisms seen     3+ WBC seen     Predominantly PMNs               06/19/2024 1541 06/23/2024 2016 Anaerobic Bacterial Culture Routine [[id number]]    Biopsy from Spine, Lumbar    Preliminary result Component Value   Culture No anaerobic organisms isolated after 4 days P               06/18/2024 1038 06/23/2024 1332 Blood Culture Arm, Right [[id number]]   Blood from Arm, Right    Final result Component Value   Culture No Growth               06/18/2024 1029 06/23/2024 1332 Blood Culture Arm, Left [[id number]]   Blood from Arm, Left    Final result Component Value   Culture No Growth               06/17/2024 1612 06/19/2024 0547 Urine Culture Aerobic Bacterial [[id number]]   Urine, Clean Catch    Final result Component Value   Culture <10,000 CFU/mL Mixture of Urogenital Joyce

## 2024-06-25 NOTE — PLAN OF CARE
Goal Outcome Evaluation:    Patient discharged via [facility] w/c transport to [facility] around 1700. PICC to [facility]. Patient discharged with all of his belongings. All questions answered at this time.

## 2024-06-25 NOTE — PLAN OF CARE
Goal Outcome Evaluation:       Patient vital signs are at baseline: Yes. On RA  Patient able to ambulate as they were prior to admission or with assist devices provided by therapies during their stay:  Yes  Patient MUST void prior to discharge:  Yes, uses urinal during the night  Patient able to tolerate oral intake:  Yes  Pain has adequate pain control using Oral analgesics:  Yes  Does patient have an identified :  Yes  Has goal D/C date and time been discussed with patient:  Pending discharge plan  Patient is A&Ox4, has lumbar dressing from aspiration CDI. Able to sleep during the night.

## 2024-06-25 NOTE — PLAN OF CARE
Goal Outcome Evaluation:    Patient vital signs are at baseline: Yes  Patient able to ambulate as they were prior to admission or with assist devices provided by therapies during their stay:  Yes  Patient MUST void prior to discharge:  Yes  Patient able to tolerate oral intake:  Yes  Pain has adequate pain control using Oral analgesics:  Yes  Does patient have an identified :  Yes  Has goal D/C date and time been discussed with patient:  Yes    A&Ox4. Pain managed with scheduled meds. Possible discharge tomorrow..

## 2024-06-25 NOTE — PROGRESS NOTES
Care Management Discharge Note    Discharge Date: 06/25/2024       Discharge Disposition: Skilled Nursing Facility, Transitional Care    Discharge Services: None    Discharge DME: None    Discharge Transportation: family or friend will provide    Private pay costs discussed: transportation costs    Does the patient's insurance plan have a 3 day qualifying hospital stay waiver?  Yes     Which insurance plan 3 day waiver is available? Alternative insurance waiver    Will the waiver be used for post-acute placement? No    PAS Confirmation Code: [id number]  Patient/family educated on Medicare website which has current facility and service quality ratings: yes    Education Provided on the Discharge Plan: Yes  Persons Notified of Discharge Plans: Patient, TCU, and [facility] staff  Patient/Family in Agreement with the Plan: yes    Handoff Referral Completed: No    Additional Information:  Patient will discharge from [facility] to [facility] with W/C ride  at [phone].  Please refer to  progress notes for further details.      ANT Varela  [facility]  Social Work

## 2024-06-25 NOTE — PROGRESS NOTES
Care Management Follow Up    Length of Stay (days): 7    Expected Discharge Date: 06/25/2024     Concerns to be Addressed: discharge planning     Patient plan of care discussed at interdisciplinary rounds: Yes    Anticipated Discharge Disposition:       Anticipated Discharge Services:    Anticipated Discharge DME:      Patient/family educated on Medicare website which has current facility and service quality ratings:    Education Provided on the Discharge Plan:    Patient/Family in Agreement with the Plan:      Referrals Placed by CM/SW:    Private pay costs discussed: transportation costs    Additional Information:  Writer reached out to [facility] and spoke with Judith ([phone]) who stated they will review the referral and get back to the writer. Writer received a response from Judith from the In-basket stating they are accepting the patient.     Writer reached out to the provider who stated they would place orders in the afternoon.     Writer stopped by the patient's room to update. Patient stated they would like the writer to set up a ride for him today. Patient aware of the out of pocket costs.     Writer reached out to Judith who stated the patient can come anytime before 1800, but they would need orders prior to 1400 due to pharmacy closing by then.     Writer updated provider and will set up a ride in the afternoon.     PAS completed.  PAS-RR    D: Per DHS regulation, TARA completed and submitted PAS-RR to [place] [employer] Direct Connect via the Senior LinkAge Line.  PAS-RR confirmation # is : [id number]    I: SW spoke with patient and they are aware a PAS-RR has been submitted.  TARA reviewed with patient that they may be contacted for a follow up appointment within 10 days of hospital discharge if their SNF stay is < 30 days.  Contact information for [facility] LinkAge Line was also provided.    A: patient verbalized understanding.    P: Further questions may be directed to [facility] LinkAge Line at  #[phone], option #4 for PAS-RR staff.    Addendum 1306:  Writer received orders and sent them via DOD.     Writer spoke with bedside rn who stated the patient was ok to go in a W/C. Writer set up a W/C ride with [facility] between [phone]. Writer updated the patient and nursing staff.     Writer completed [employer] Auth. Auth approved from 06/25 - 01/01. ([id number])    Writer sent out scripts to [facility] [phone].          ANT Varela  [facility]  Social Work

## 2024-06-25 NOTE — DISCHARGE SUMMARY
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  Acute on chronic back pain with RLE radiculopathy.  Severe spinal stenosis.  Physical deconditioning from medical illness, senile frailty.    Past Medical History:   Diagnosis Date    Anemia     Arthritis     osteoarthritis knees    BPH     CAD (coronary artery disease)     subtotal occlusion in the small distal LAD     Cardiomyopathy     Cerebral artery occlusion with cerebral infarction     TIA 1993, no residual    Cervicalgia     CHF (congestive heart failure) (H)     Chronic kidney disease     Chronic low back pain     Chronic rhinitis     Gouty arthropathy     hands    Hyperlipidemia     Hypertension     Kidney stone     Nocturia     Obesity     Osteomyelitis (H) 08/2023    Foot    PVD (peripheral vascular disease)     Sleep apnea     doesn't use cpap    TIA (transient ischaemic attack) 1993    Type 2 diabetes mellitus - 11/08/2018    Ureteral stone        History of Present Illness   Lance Ibrahim is an 79 year old male who presented with back pain.       Hospital Course   Lance Ibrahim is a 79 year old male with history of heart failure with preserved ejection fraction, hypertension, dyslipidemia, CKD, gout, BPH, type 2 diabetes and chronic low back pain for which he is folllowed by [facility] and has an implantable spine stimulator ([employer], MRI conditional 4/2024) in place. He initially presented to [facility] with acute on chronic, intractable pain with RLE radiculopathy. CT concerning for discitis-osteomyelitis at L4-5 with epidural/intraforaminal phlegmon vs abscess at L4-S1 with  severe foraminal narrowing. Transferred to [facility] for Neurosurgery evaluation and ID consultation.      Hospitalization on 5/16-5/22/2024 due to severe sepsis related to UTI caused by Klebsiella oxytoca.     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  Acute on chronic back pain with RLE radiculopathy.  Severe spinal stenosis.  --Presented with acute on chronic back pain located on the right side of his back.  Significant tenderness to palpation at approximately the L3-L4 level on the right side with associated RLE radiculopathy. No bladder or bowel incontinence, no saddle anesthesia. Uses a walker for shorter distances and wheelchair for longer distances at baseline. No recent trauma or falls, but did get a trigger point injection by [facility] on 6/14.   *Afebrile since admission, hemodynamically stable.  WBC WNL. CRP 12.4.   *CT lumbar spine as above (review formal report for complete details)   *MRI lumbar spine Discitis osteomyelitis at L4-5. 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 At L4-5, severe spinal stenosis secondary to the epidural abscess. At L5-S1, moderate spinal stenosis secondary to the epidural abscess.  --Status post IR guided L4-L5 disc aspiration and L4 biopsy. 1 ml of rust colored cloudy fluid aspirated from L4-5 disc space. Large core specimen also obtained from L4 interior endplate  --Started on IV vancomycin on 6/19 post IR guided aspiration.    Blood cultures no growth to date.  Intraoperative biopsy, aspirate - no growth to date.  Noted allergies to penicillin, cephalosporins and sulfa.   --Infectious disease followed - Anticipate at least 6 weeks of IV antibiotics.   --Neurosurgery signed off, no surgical intervention.  Pain management followed.  Appreciate multidisciplinary team input.  Continue intravenous  vancomycin (6/19).  PICC line placed 6/23.    Added ertapenem [6/24] for gram-negative coverage given recent Klebsiella bacteremia.  Vancomycin until 7/31, Ertapenem until 8/5     --Continue as needed Tylenol, as needed oxycodone.    Continue as needed Robaxin. Minimize narcotic use as able to, prescription for 12 tablets provided.  Rehab team recommending TCU for rehabilitation.    Follow-up with provider at TCU within 1 week.  Follow CRP, BMP, WBC count in 1 week.  Follow-up with infectious disease in clinic in 2 weeks.  Follow-up with neurosurgery in clinic per schedule, plan to repeat MRI in 6 weeks.     Bacteriuria with cultures negative  Recent severe sepsis in the setting of Klebsiella UTI:   Hospitalization on 5/16/2024-5/22/2024 due to severe sepsis related to UTI caused by Klebsiella oxytoca. Patient was on IV Vanco, Levaquin, Cefepime, and Ceftriaxone in the hospital. He was discharged on oral Cefdinir and Levaquin. No current sx of UTI.   -UA with trace blood in urine, few bacteria otherwise unremarkable.   Urine cultures with less than 10,000 mixed fred.  CT A/P on admission negative for renal calculi or hydronephrosis, note left renal cyst.   --On ertapenem as above.     Uncontrolled type 2 diabetes mellitus with a hemoglobin A1c of 8.0.  Peripheral neuropathy.  PTA on glipizide, semaglutide, as needed gabapentin.  Continue PTA oral meds.  Continue scheduled gabapentin 100 mg 3 times daily.  Started on insulin Lantus on 6/19, continue insulin Lantus 8 units twice daily.    Monitor blood sugars, optimize regimen.       CAD, subtotal occlusion in small distal LAD.  HFpEF, not in acute exacerbation   Mild aortic stenosis   HTN, HLD.   Denies any chest pain or palpitations or shortness of breath.    Euvolemic.  Blood pressure within normal limits.  Echo 5/16/24 with preserved EF, borderline global hypokinesia, wall motion consistent with conduction abnormality.   Continue PTA lisinopril 10 mg oral  daily.  Continue PTA torsemide.  Continue PTA aspirin.  Monitor blood pressures, optimize regimen.     CKD IIIa:   Baseline creatinine 1.3-1.6 more recently. Stable on admission.   Continue PTA sodium bicarbonate.  Monitor in 7 days.     Normocytic anemia: Baseline Hgb 8-9 range. 10.2 on admission.   Continue PTA iron supplements.  Monitor in 1 week.     Hx of TIA, 1993, no residual deficits  PAD s/p toe amputation   Continue PTA aspirin.    Gout: Not in a flare.  Continue PTA colchicine.     Biliary obstruction with stricture s/p EUS/ERCP 4/30/24 with sphincterotomy and temporary stent placement   *Admission CT notes common bile duct stent with slight dilatation of the intrahepatic bile ducts. A tiny portion of the distal stent appears fractured, of doubtful clinical significance. Pt without abdominal pain.   Continue PTA Ursodiol 300 mg BID     Baseline R>L DELFINO, mild     Chronic knee pain secondary to arthritis.  No warmth or swelling or tenderness present.  Continue PTA as needed diclofenac gel.  Pain meds as above     Physical deconditioning from medical illness, senile frailty.  [facility] in [place]  Ambulation: walker for short distances, wheelchair for longer distances  Rehab team following, TCU.     Obesity with a BMI of 30.11.  Increase all-cause mortality and morbidity.  Consider lifestyle modification with diet and exercise as able to.     Sleep apnea:  Does not use CPAP  Recommend sleep studies as outpatient.    Tasha Vivar MD.    Pending Results   These results will be followed up by ordering provider  Unresulted Labs Ordered in the Past 30 Days of this Admission       Date and Time Order Name Status Description    6/19/2024  7:07 PM Anaerobic Bacterial Culture Routine Preliminary     6/19/2024 11:46 AM Anaerobic Bacterial Culture Routine Preliminary                Physical Exam   There were no vitals filed for this visit.  Vital Signs with Ranges  Temp:  [97.8  F (36.6  C)-98.3  F (36.8  C)]  98.3  F (36.8  C)  Pulse:  [86-95] 88  Resp:  [16-18] 18  BP: (132-145)/(71-86) 145/86  SpO2:  [91 %-100 %] 91 %  I/O last 3 completed shifts:  In: 100 [P.O.:100]  Out: 1125 [Urine:1125]  PHYSICAL EXAM  GENERAL: Patient is in no distress. Alert and oriented.  HEART: Regular rate and rhythm. S1S2.   LUNGS: Respirations unlabored  ABDOMEN: Soft, no abdominal tenderness, bowel sounds heard   NEURO: Moving all extremities.  EXTREMITIES: trace pedal edema.  No knee swelling, no warmth or tenderness.  No erythema  SKIN: Warm, dry. No rash  PSYCHIATRY Cooperative  )Consultations This Hospital Stay   NEUROSURGERY IP CONSULT  INFECTIOUS DISEASES IP CONSULT  PHARMACY IP CONSULT  PAIN MANAGEMENT ADULT IP CONSULT  INTERVENTIONAL RADIOLOGY ADULT/PEDS IP CONSULT  PHARMACY TO DOSE VANCO  PHYSICAL THERAPY ADULT IP CONSULT  OCCUPATIONAL THERAPY ADULT IP CONSULT  CARE MANAGEMENT / SOCIAL WORK IP CONSULT  SPIRITUAL HEALTH SERVICES IP CONSULT  VASCULAR ACCESS ADULT IP CONSULT  PHYSICAL THERAPY ADULT IP CONSULT  OCCUPATIONAL THERAPY ADULT IP CONSULT    Time Spent on this Encounter   ITasha MD, personally saw the patient today and spent greater than 30 minutes discharging this patient. Discussed with patient, bedside RN, SW.    Discharge Disposition   Discharged to short-term care facility  Condition at discharge: Stable    Discharge Orders      MR Lumbar Spine w/o & w Contrast     Primary Care - Care Coordination Referral      Follow-up and recommended labs and tests     Your Neurosurgical follow-up appointments have been recommended 1 month with lumbar MRI w/wo contrast prior. You may call [phone] to make, confirm or change your appointment dates and/or times.     Reason for your hospital stay    You were admitted to the hospital with back pain, noted to have discitis, osteomyelitis, epidural abscess.  Followed by interventional radiology, neurosurgery, infectious disease.  Plan for discharge with intravenous  antibiotics.     Follow-up and recommended labs and tests     Follow-up with infectious disease in clinic in 2 weeks.  Follow-up with neurosurgery in clinic per schedule, plan to repeat MRI in 6 weeks.     Activity    Your activity upon discharge: activity as tolerated     Monitor and record    Monitor blood sugars at least 2 times a day, review on provider visit and optimize therapy.  Monitor daily blood pressure, heart rate review on provider visit and optimize therapy.     General info for SNF    Length of Stay Estimate: Short Term Care: Estimated # of Days <30  Condition at Discharge: Stable  Level of care:skilled   Rehabilitation Potential: Fair  Admission H&P remains valid and up-to-date: Yes  Recent Chemotherapy: N/A  Use Nursing Home Standing Orders: Yes     Mantoux instructions    Give two-step Mantoux (PPD) Per Facility Policy Yes     Activity - Up with nursing assistance     Follow Up and recommended labs and tests    Monitor CBC, CMP,CRP in 1 week or earlier if symptomatic.  Age-appropriate health maintenance on PCP visit, consider sleep studies as outpatient.     Discharge Instructions    PICC line care. IV Vancomycin until 7/31, IV Ertapenem until 8/5        Minimize narcotic use as able to.  Encourage ambulation as able to.  Timed voiding.  Fall precautions.  Consider lifestyle modification with diet and exercise as able to.     Physical Therapy Adult Consult    Evaluate and treat as clinically indicated.    Reason: Physical deconditioning.     Occupational Therapy Adult Consult    Evaluate and treat as clinically indicated.    Reason: Physical deconditioning.     Fall precautions     Diet    Fat, carbohydrate controlled diet.       Discharge Medications   Current Discharge Medication List        START taking these medications    Details   ertapenem (INVANZ) 1 GM vial Inject 1 g into the vein every 24 hours for 42 doses    Comments: Check weekly CBC/diff, creatinine, AST, cRP  Send results to Tsering  Yas at [facility]  Associated Diagnoses: Lumbar discitis      insulin glargine (LANTUS PEN) 100 UNIT/ML pen Inject 8 Units Subcutaneous 2 times daily Hold if Blood sugar less than 100.    Comments: If Lantus is not covered by insurance, may substitute Basaglar or Semglee or other insulin glargine product per insurance preference at same dose and frequency.    Associated Diagnoses: Diabetic ulcer of toe of right foot associated with diabetes mellitus due to underlying condition, with necrosis of bone (H); Type 2 diabetes mellitus with other skin complication, without long-term current use of insulin (H)      methocarbamol (ROBAXIN) 500 MG tablet Take 1 tablet (500 mg) by mouth 3 times daily as needed for muscle spasms  Qty: 10 tablet, Refills: 0    Associated Diagnoses: Acute right-sided low back pain with right-sided sciatica      senna-docusate (SENOKOT-S/PERICOLACE) 8.6-50 MG tablet Take 1 tablet by mouth 2 times daily as needed for constipation    Associated Diagnoses: Constipation, unspecified constipation type      vancomycin (VANCOCIN) 1500 mg/250 mL IVPB Inject 1,500 mg into the vein every 24 hours for 41 days Please check every Monday CBC with diff, creatinine, vanco level, AST, and CRP. Every Thursday check creatinine and vanco level. Pharmacist to assist with dosing vanco with AUC. Fax results to [facility].    Associated Diagnoses: Lumbar discitis           CONTINUE these medications which have CHANGED    Details   acetaminophen (TYLENOL) 325 MG tablet Take 2 tablets (650 mg) by mouth every 6 hours as needed for pain (and adjunct with moderate or severe pain or per patient request)    Associated Diagnoses: Acute right-sided low back pain with right-sided sciatica      !! ferrous gluconate (FERGON) 324 (38 Fe) MG tablet Take 1 tablet (324 mg) by mouth daily    Associated Diagnoses: Iron deficiency anemia, unspecified iron deficiency anemia type      gabapentin (NEURONTIN) 100 MG  capsule Take 1 capsule (100 mg) by mouth 3 times daily    Associated Diagnoses: Acute right-sided low back pain with right-sided sciatica      oxyCODONE (ROXICODONE) 5 MG tablet Take 0.5-1 tablets (2.5-5 mg) by mouth every 6 hours as needed for moderate to severe pain (2.5 mg for moderate pain, 5 mg for severe pain.)  Qty: 12 tablet, Refills: 0    Associated Diagnoses: Acute right-sided low back pain with right-sided sciatica       !! - Potential duplicate medications found. Please discuss with provider.        CONTINUE these medications which have NOT CHANGED    Details   aspirin 81 MG EC tablet Take 1 tablet (81 mg) by mouth daily    Associated Diagnoses: Coronary artery disease involving native coronary artery of native heart without angina pectoris      calcium carbonate (TUMS) 500 MG chewable tablet Take 1,000 mg by mouth 3 times daily as needed for heartburn      colchicine (COLCYRS) 0.6 MG tablet TAKE 1 TABLET DAILY  Qty: 90 tablet, Refills: 3    Associated Diagnoses: Gouty arthropathy      diclofenac (VOLTAREN) 1 % topical gel Apply 4 g topically 4 times daily as needed for moderate pain Left knee      emollient (VANICREAM) external cream Apply topically 3 times daily  Qty: 113 g, Refills: 3    Associated Diagnoses: Rash      famotidine (PEPCID) 20 MG tablet Take 1 tablet (20 mg) by mouth 2 times daily as needed (itching)  Qty: 30 tablet, Refills: 0      !! Ferrous Gluconate 240 (27 Fe) MG TABS Take 1 tablet by mouth daily       fluticasone (FLONASE) 50 MCG/ACT nasal spray Spray 1-2 sprays in nostril daily as needed      glipiZIDE (GLUCOTROL XL) 2.5 MG 24 hr tablet Take 2.5 mg by mouth daily      lactobacillus rhamnosus, GG, (CULTURELL) capsule Take 2 capsules by mouth daily Noon      Lidocaine (LIDOCARE) 4 % Patch Place 1 patch onto the skin daily as needed To prevent lidocaine toxicity, patient should be patch free for 12 hrs daily.      lisinopril (ZESTRIL) 10 MG tablet Take 1 tablet (10 mg) by mouth  every 24 hours  Qty: 90 tablet, Refills: 3    Associated Diagnoses: Chronic diastolic heart failure (H)      loratadine (CLARITIN) 10 MG tablet Take 10 mg by mouth daily as needed for allergies      melatonin 5 MG CAPS Take 5 mg by mouth at bedtime      nortriptyline (PAMELOR) 10 MG capsule Take 20 mg by mouth nightly as needed      polyethylene glycol (MIRALAX) 17 GM/Dose powder Take 17 g by mouth daily as needed for constipation  Qty: 225 g, Refills: 0    Associated Diagnoses: Chronic low back pain without sciatica, unspecified back pain laterality      Semaglutide (RYBELSUS) 14 MG tablet Take 14 mg by mouth daily  Qty: 30 tablet, Refills: 3    Associated Diagnoses: Type 2 diabetes, HbA1c goal < 7% (H)      sodium bicarbonate 650 MG tablet Take 3 tablets (1,950 mg) by mouth 3 times daily  Qty: 180 tablet, Refills: 1    Associated Diagnoses: Acidosis      torsemide (DEMADEX) 20 MG tablet TAKE 1 TABLET DAILY  Qty: 90 tablet, Refills: 3    Associated Diagnoses: Essential hypertension      ursodiol (ACTIGALL) 300 MG capsule Take 1 capsule (300 mg) by mouth 2 times daily  Qty: 60 capsule, Refills: 0    Associated Diagnoses: Biliary stricture (H28)      blood glucose monitoring (NO BRAND SPECIFIED) meter device kit Use to test blood sugar 2 times daily or as directed.  Qty: 1 kit, Refills: 0    Associated Diagnoses: Type 2 diabetes mellitus with peripheral vascular disease (H)      CONTOUR NEXT TEST test strip USE TO TEST BLOOD SUGAR 2 TIMES DAILY OR AS DIRECTED.  Qty: 100 strip, Refills: 0    Associated Diagnoses: Type 2 diabetes mellitus with peripheral vascular disease (H)      HEMP OIL OR EXTRACT OR OTHER CBD CANNABINOID, NOT MEDICAL CANNABIS, Take 1 chew tab by mouth at bedtime CBD gummy      Microlet Lancets MISC USE TO TEST BLOOD SUGAR TWICE A DAY OR AS DIRECTED  Qty: 100 each, Refills: 1    Associated Diagnoses: Type 2 diabetes, HbA1c goal < 7% (H)       !! - Potential duplicate medications found. Please discuss  "with provider.        STOP taking these medications       ibuprofen (ADVIL/MOTRIN) 400 MG tablet Comments:   Reason for Stopping:             Allergies   Allergies   Allergen Reactions    Ancef [Cefazolin] Rash    Cephalexin Itching and Rash     Allergic reaction required hospitalization 4/2024    Penicillins Anaphylaxis    Sulfa Antibiotics      \"itchy rash, swelling of face and hands\"       DATA  Most Recent 3 CBC's:  Recent Labs   Lab Test 06/24/24  0739 06/20/24  0829 06/19/24  0710 06/18/24  0845 06/17/24  1758   WBC  --   --  7.8 8.2 10.2   HGB 9.4* 10.9* 9.7* 9.7* 10.2*   MCV  --   --  93 94 90   PLT  --   --  289 284 348      Most Recent 3 BMP's:  Recent Labs   Lab Test 06/25/24  1139 06/25/24  0743 06/25/24  0559 06/24/24  1101 06/24/24  0739 06/23/24  1229 06/23/24  0736 06/22/24  1127 06/22/24  0851 06/21/24  1259 06/21/24  0905 06/20/24  1212 06/20/24  0829 06/19/24  0750 06/19/24  0710   NA  --   --   --   --  138  --   --   --   --   --   --   --  136  --  135   POTASSIUM  --   --   --   --  4.5  --   --   --   --   --  4.1  --  4.5  --  4.4   CHLORIDE  --   --   --   --  102  --   --   --   --   --   --   --  99  --  101   CO2  --   --   --   --  23  --   --   --   --   --   --   --  23  --  21*   BUN  --   --   --   --  20.5  --   --   --   --   --   --   --  21.5  --  23.3*   CR  --   --   --   --  1.14  --  1.18*  --  1.26*  --  1.24*  --  1.17  --  1.16   ANIONGAP  --   --   --   --  13  --   --   --   --   --   --   --  14  --  13   SHANNAN  --   --   --   --  9.0  --   --   --   --   --   --   --  9.2  --  9.1   * 126* 132*   < > 146*   < >  --    < >  --    < >  --    < > 172*   < > 150*    < > = values in this interval not displayed.     Most Recent 2 LFT's:  Recent Labs   Lab Test 06/17/24  1758 05/30/24  0747   AST 19 56*   ALT 25 38   ALKPHOS 155* 240*   BILITOTAL 0.4 0.9       Most Recent TSH, T4 and A1c Labs:  Recent Labs   Lab Test 06/18/24  0845 02/19/20  0741 01/24/20  1359   TSH  " --   --  3.24   A1C 8.0*   < >  --     < > = values in this interval not displayed.     Results for orders placed or performed during the hospital encounter of 06/18/24   MR Lumbar Spine w/o & w Contrast    Narrative    For Patients: As a result of the 21st Century Cures Act, medical imaging exams and procedure reports are released immediately into your electronic medical record. You may view this report before your referring provider. If you have questions, please   contact your health care provider.    EXAM: MR LUMBAR SPINE W/O and W CONTRAST  LOCATION: [facility]  DATE/TIME: 6/18/2024 7:52 PM CDT    INDICATION: Urosepsis, abnormal CT.  COMPARISON: CT 6/17/2024.  TECHNIQUE: Routine lumbar spine MRI without and with IV contrast.    FINDINGS:   Nomenclature: Nomenclature based on 5 lumbar type vertebral bodies.   Infection changes: Significant abnormal bone marrow edema L4 and L5 vertebral bodies, evidence of fluid in the L4-L5 disc space with significant enhancement of the vertebral bodies. Epidural abscess in the left paracentral epidural space measures 6 x 6   mm in the axial plane and 5 cm craniocaudally posterior to L4-S1 vertebrae.  Alignment: Minor retrolisthesis L3-L4, L4-L5 and L5-S1.   Vertebrae: Normal vertebral body heights.   Marrow signal: Marked bone marrow edema L4 and L5 vertebral bodies.  Spinal cord: Normal distal spinal cord and cauda equina with conus medullaris at L1.   Extraspinal: No extra spinal abnormalities.  Pelvis: Unremarkable visualized bony pelvis.    T12-L1: Normal height of disc. Mild disc desiccation without herniation. Normal facet joints. No recess stenosis. No central spinal stenosis, no right foramen stenosis, no left foramen stenosis.    L1-L2: Normal height of disc. Mild disc desiccation without herniation. Normal facet joints. No recess stenosis. No central spinal stenosis, no right foramen stenosis, no left foramen stenosis.    L2-L3: Normal height  of disc. Mild disc desiccation without herniation. Mild bilateral facet hypertrophy. No recess stenosis. No central spinal stenosis, no right foramen stenosis, no left foramen stenosis.    L3-L4: Normal height of disc. Desiccated disc with shallow disc bulge. Moderate bilateral facet hypertrophy. No recess stenosis. No central spinal stenosis, no right foramen stenosis, no left foramen stenosis.    L4-L5: Marked loss of height. Fluid signal with prominent disc bulge. Moderate bilateral facet hypertrophy. Epidural abscess. Severe recess stenosis. Severe central spinal stenosis, no right foramen stenosis, no left foramen stenosis.    L5-S1: Moderate loss of disc height. Degenerated disc with moderate left subarticular protrusion. Moderate bilateral facet hypertrophy. Moderate bilateral recess stenosis. Moderate central spinal stenosis, severe right foramen stenosis, mild left foramen   stenosis.      Impression    IMPRESSION:  1.  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  2.  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  3.  At L4-5, severe spinal stenosis secondary to the epidural abscess.  4.  At L5-S1, moderate spinal stenosis secondary to the epidural abscess.   IR Disc Aspriation Injection    Narrative    INTERVENTIONAL NEURORADIOLOGY  6/19/2024 3:56 PM    1. FLUOROSCOPICALLY GUIDED PERCUTANEOUS L4-5 DISC ASPIRATION  2. FLUOROSCOPICALLY GUIDED PERCUTANEOUS L4 VERTEBRAL BODY BONE BIOPSY    INDICATION: 79-year-old man with low back pain, MRI concerning for  L4-5 discitis osteomyelitis. Request for disc aspiration and possible  bone biopsy for further diagnosis and management.    CONSENT: The procedure and its indications, major risks, benefits, and  alternatives were discussed. Risks including, but not limited to,  pain, hemorrhage, infection, nerve damage, spinal cord damage, cardiac  and pulmonary dysfunction related to sedation. The  possibility that  the procedure may be nondiagnostic was discussed. Understanding was  acknowledged, and a signed informed consent was obtained.    MODERATE SEDATION: Versed 1 mg. Fentanyl 50 mcg. The procedure was  performed with the administration of intravenous conscious sedation  with appropriate preoperative, intraoperative, and postoperative  evaluation. 10 minutes of supervised face to face conscious sedation  time was provided by a radiology nurse under my direct supervision.  During the time-out, immediately prior to administration of  medications, the patient was re-assessed for adequacy to receive  conscious sedation.    MEDICATIONS:  Versed 1 mg IV  Fentanyl 50 mcg IV    FLUOROSCOPIC TIME: 2.7 minutes     AIR KERMA: 108.65 mGy    ESTIMATED BLOOD LOSS: Minimal     PERFORMING PHYSICIAN(S):  Antonio Henderson MD     PROCEDURE: The patient was placed in prone position upon the  fluoroscopic table. The skin of the back was prepped and draped in  sterile fashion. Comparing with the lumbar spine scan, the L4-5 level  was fluoroscopically localized. The skin over the left aspect of the  L4-5 disc space trajectory was infiltrated with 1% lidocaine. A 6 inch  20-gauge needle was then advanced into the disc space under  fluoroscopic guidance. A total of 1 mLof rust colored, cloudy fluid  was aspirated and sent for further analysis. The needle was then  removed. Due to the limited fluid sample obtained, we next proceeded  with a bone biopsy to enhance the diagnostic yield of the procedure.    Comparing with the lumbar spine MRI scan, the L4 level was  fluoroscopically localized. The skin over the left pedicle was  infiltrated with 1% lidocaine down to the level of the bone. A small  skin nick was made over the pedicle through which a [place] introducer  device was advanced until its tip was present within the posterior  aspect of the vertebral body. Using fluoroscopic guidance, a 10-gauge  biopsy needle was advanced  to the inferior endplate. A single pass was  made, obtaining a large core specimen, which was placed in a sterile  container. The instrumentation was then removed. Manual compression  was applied to the back until hemostasis was achieved.    A dressing was applied. The procedure appeared well-tolerated. There  was no apparent complication.    CONCLUSION:     1. Technically successful fluoroscopically-guided percutaneous L4-5  disc aspiration with retrieval of 1 mL of rust colored, cloudy fluid  which was sent for further analysis. Due to the limited fluid sample  obtained, a bone biopsy was performed in order to enhance the  diagnostic yield of the procedure.    2. Technically successful fluoroscopically-guided percutaneous L4  vertebral body bone biopsy.  _____________________________________________    CPT codes included for physician reference only: 50375/86545,  29568/65066    SUJIT ALBERT MD         SYSTEM ID:  [id number]     *Note: Due to a large number of results and/or encounters for the requested time period, some results have not been displayed. A complete set of results can be found in Results Review.

## 2024-06-26 ENCOUNTER — TRANSITIONAL CARE UNIT VISIT (OUTPATIENT)
Dept: GERIATRICS | Facility: CLINIC | Age: 79
End: 2024-06-26
Payer: COMMERCIAL

## 2024-06-26 ENCOUNTER — DOCUMENTATION ONLY (OUTPATIENT)
Dept: GERIATRICS | Facility: CLINIC | Age: 79
End: 2024-06-26

## 2024-06-26 ENCOUNTER — PATIENT OUTREACH (OUTPATIENT)
Dept: CARE COORDINATION | Facility: CLINIC | Age: 79
End: 2024-06-26
Payer: COMMERCIAL

## 2024-06-26 ENCOUNTER — LAB REQUISITION (OUTPATIENT)
Dept: LAB | Facility: CLINIC | Age: 79
End: 2024-06-26
Payer: COMMERCIAL

## 2024-06-26 VITALS
OXYGEN SATURATION: 98 % | SYSTOLIC BLOOD PRESSURE: 153 MMHG | WEIGHT: 200.7 LBS | TEMPERATURE: 97.2 F | HEIGHT: 72 IN | HEART RATE: 117 BPM | RESPIRATION RATE: 20 BRPM | BODY MASS INDEX: 27.19 KG/M2 | DIASTOLIC BLOOD PRESSURE: 64 MMHG

## 2024-06-26 DIAGNOSIS — R52 PAIN MANAGEMENT: Primary | ICD-10-CM

## 2024-06-26 DIAGNOSIS — R53.81 PHYSICAL DECONDITIONING: ICD-10-CM

## 2024-06-26 DIAGNOSIS — R65.20 SEVERE SEPSIS (H): ICD-10-CM

## 2024-06-26 DIAGNOSIS — G06.2 EXTRADURAL AND SUBDURAL ABSCESS, UNSPECIFIED: ICD-10-CM

## 2024-06-26 DIAGNOSIS — A41.9 SEVERE SEPSIS (H): ICD-10-CM

## 2024-06-26 DIAGNOSIS — M46.26 OSTEOMYELITIS OF VERTEBRA, LUMBAR REGION (H): ICD-10-CM

## 2024-06-26 LAB
BACTERIA ASPIRATE CULT: NORMAL
BACTERIA TISS BX CULT: NORMAL

## 2024-06-26 PROCEDURE — 99310 SBSQ NF CARE HIGH MDM 45: CPT | Performed by: NURSE PRACTITIONER

## 2024-06-26 NOTE — LETTER
6/26/2024      Lance Ibrahim  [address]  [facility] [address]        [facility]  Chief Complaint   Patient presents with     Hospital F/U     [facility] Medical Record Number:  [id number]  Place of Service where encounter took place:  [facility] ([facility]) [[id number]]  Code Status:  unknown     HISTORY:      HPI:  Lance Ibrahim  is 79 year old (1945) undergoing physical and occupational therapy.  He is with history of heart failure with preserved ejection fraction, hypertension, dyslipidemia, CKD, gout, BPH, type 2 diabetes and chronic low back pain for which he is folllowed by [facility] and has an implantable spine stimulator ([employer], MRI conditional 4/2024) in place.  Excerpted from records He initially presented to [facility] with acute on chronic, intractable pain with RLE radiculopathy.  For CT concerning for discitis-osteomyelitis at L4-5 with epidural/intraforaminal phlegmon vs abscess at L4-S1 with severe foraminal narrowing. Transferred to [facility] for Neurosurgery evaluation and ID consultation.      Hospitalization on 5/16-5/22/2024 due to severe sepsis related to UTI caused by Klebsiella oxytoca.     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  Acute on chronic back pain with RLE radiculopathy.  Severe spinal stenosis.  --Presented with acute on chronic back pain located on the right side of his back.  Significant tenderness to palpation at approximately the L3-L4 level on the right side with associated RLE radiculopathy. No bladder or bowel incontinence, no saddle anesthesia. Uses a walker for shorter distances and wheelchair for longer distances at baseline. No recent trauma or falls, but did get a trigger point injection by [facility] on 6/14.   *Afebrile since admission, hemodynamically stable.  WBC WNL. CRP 12.4.   *CT lumbar spine as above (review formal report for complete  details)   *MRI lumbar spine Discitis osteomyelitis at L4-5. 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 At L4-5, severe spinal stenosis secondary to the epidural abscess. At L5-S1, moderate spinal stenosis secondary to the epidural abscess.  --Status post IR guided L4-L5 disc aspiration and L4 biopsy. 1 ml of rust colored cloudy fluid aspirated from L4-5 disc space. Large core specimen also obtained from L4 interior endplate  --Started on IV vancomycin on 6/19 post IR guided aspiration.    Blood cultures no growth to date.  Intraoperative biopsy, aspirate - no growth to date.  Noted allergies to penicillin, cephalosporins and sulfa.   --Infectious disease followed - Anticipate at least 6 weeks of IV antibiotics.   --Neurosurgery signed off, no surgical intervention.  Continue intravenous vancomycin (6/19).  PICC line placed 6/23.    Added ertapenem [6/24] for gram-negative coverage given recent Klebsiella bacteremia.  Vancomycin until 7/31, Ertapenem until 8/5    plan to repeat MRI in 6 weeks.     Bacteriuria with cultures negative  Recent severe sepsis in the setting of Klebsiella UTI:   Hospitalization on 5/16/2024-5/22/2024 due to severe sepsis related to UTI caused by Klebsiella oxytoca. Patient was on IV Vanco, Levaquin, Cefepime, and Ceftriaxone in the hospital. He was discharged on oral Cefdinir and Levaquin. No current sx of UTI.   -UA with trace blood in urine, few bacteria otherwise unremarkable.   Urine cultures with less than 10,000 mixed fred.  CT A/P on admission negative for renal calculi or hydronephrosis, note left renal cyst.   --On ertapenem as above.    Today he is seen to review VS, Labs, routine visit and to establish care.  He denied chest pain shortness of breath cough congestion constipation or diarrhea pain controlled with current medications.  He is with a spinal cord stimulator right lower back in which she  reports he charges weekly.  He will continue on IV antibiotics until 8/6/2024.   Pharmacy to dose vancomycin.  labs reviewed hemoglobin 9.4 A1c 2/20/2024 8.0 he will continue on weekly labs and daily weights for CHF however he denies this diagnosis.  He is with left knee weakness and difficulty ambulating.  He reports he was supposed to have left  knee surgery on 5/20/2024 however it was postponed due to his infection. He is with a history of right knee surgery.    ALLERGIES:Ancef [cefazolin], Cephalexin, Penicillins, and Sulfa antibiotics    PAST MEDICAL HISTORY:   Past Medical History:   Diagnosis Date     Anemia      Arthritis     osteoarthritis knees     BPH      CAD (coronary artery disease)     subtotal occlusion in the small distal LAD      Cardiomyopathy      Cerebral artery occlusion with cerebral infarction     TIA 1993, no residual     Cervicalgia      CHF (congestive heart failure) (H)      Chronic kidney disease      Chronic low back pain      Chronic rhinitis      Gouty arthropathy     hands     Hyperlipidemia      Hypertension      Kidney stone      Nocturia      Obesity      Osteomyelitis (H) 08/2023    Foot     PVD (peripheral vascular disease)      Sleep apnea     doesn't use cpap     TIA (transient ischaemic attack) 1993     Type 2 diabetes mellitus - 11/08/2018     Ureteral stone        PAST SURGICAL HISTORY:   has a past surgical history that includes Diastasis recti repair (1985); Ventral hernia repair NOS (1987); ORIF Shoulder (Left); Colonoscopy (03/09/2013); hernia repair (07/13/2004); Foot surgery (04/2013); Amputate toe(s) (Left, 10/15/2018); Arthroplasty knee (Right, 12/07/2018); Heart Catheterization with Possible Intervention (Left, 03/05/2019); Extracapsular cataract extration with intraocular lens implant (Left, 03/13/2017); Extracapsular cataract extration with intraocular lens implant (Right); Amputate foot (Right, 08/07/2023); Esophagoscopy, gastroscopy, duodenoscopy (EGD),  combined (N/A, 04/30/2024); Endoscopic retrograde cholangiopancreatogram (N/A, 04/30/2024); Spinal Cord Stimulator Implant (04/03/2024); Prostate surgery (02/2024); and IR Disc Aspriation Injection (6/19/2024).    FAMILY HISTORY: family history includes Alcohol/Drug in his paternal grandfather; Cancer in his father; Diabetes in his maternal grandfather; Family History Negative in his mother.    SOCIAL HISTORY:  reports that he quit smoking about 31 years ago. His smoking use included cigarettes. He has never used smokeless tobacco. He reports that he does not currently use alcohol. He reports that he does not use drugs.    ROS:  Constitutional: Negative for activity change, appetite change, fatigue and fever.   HENT: Negative for congestion.    Respiratory: Negative for cough, shortness of breath and wheezing.    Cardiovascular: Negative for chest pain and leg swelling.   Gastrointestinal: Negative for abdominal distention, abdominal pain, constipation, diarrhea and nausea.   Genitourinary: Negative for dysuria.   Musculoskeletal: positive  for arthralgia. Positive  for back pain.   Skin: Negative for color change and wound. PICC LINE    Neurological: Negative for dizziness.   Psychiatric/Behavioral: Negative for agitation, behavioral problems and confusion.     Physical Exam:  Constitutional:       Appearance: Patient is well-developed.   HENT:      Head: Normocephalic.   Eyes:      Conjunctiva/sclera: Conjunctivae normal.   Neck:      Musculoskeletal: Normal range of motion.   Cardiovascular:      Rate and Rhythm: Normal rate and regular rhythm.      Heart sounds: Normal heart sounds. No murmur.   Pulmonary:      Effort: No respiratory distress.      Breath sounds: Normal breath sounds. No wheezing or rales.   Abdominal:      General: Bowel sounds are normal. There is no distension.      Palpations: Abdomen is soft.      Tenderness: There is no abdominal tenderness.   Musculoskeletal:       Normal range of motion.   decreased ROM LUE and LLE   Skin:General:        Skin is warm.   Neurological:         Mental Status: Patient is alert and oriented to person, place, and time.   Psychiatric:         Behavior: Behavior normal.     Vitals:BP (!) 153/64   Pulse 117   Temp 97.2  F (36.2  C)   Resp 20   Ht 1.829 m (6')   Wt 91 kg (200 lb 11.2 oz)   SpO2 98%   BMI 27.22 kg/m   and Body mass index is 27.22 kg/m .    Lab/Diagnostic data:   Recent Results (from the past 240 hour(s))   UA Macroscopic with reflex to Microscopic and Culture - Clinic Collect    Collection Time: 06/17/24  4:12 PM    Specimen: Urine, Clean Catch   Result Value Ref Range    Color Urine Yellow Colorless, Straw, Light Yellow, Yellow    Appearance Urine Clear Clear    Glucose Urine Negative Negative mg/dL    Bilirubin Urine Negative Negative    Ketones Urine Negative Negative mg/dL    Specific Gravity Urine 1.015 1.005 - 1.030    Blood Urine Trace (A) Negative    pH Urine 6.0 5.0 - 8.0    Protein Albumin Urine 30 (A) Negative mg/dL    Urobilinogen Urine 0.2 0.2, 1.0 E.U./dL    Nitrite Urine Negative Negative    Leukocyte Esterase Urine Negative Negative   UA Microscopic with Reflex to Culture    Collection Time: 06/17/24  4:12 PM   Result Value Ref Range    Bacteria Urine Few (A) None Seen /HPF    RBC Urine 0-2 0-2 /HPF /HPF    WBC Urine 0-5 0-5 /HPF /HPF    Squamous Epithelials Urine Few (A) None Seen /LPF   Urine Culture Aerobic Bacterial    Collection Time: 06/17/24  4:12 PM    Specimen: Urine, Clean Catch   Result Value Ref Range    Culture <10,000 CFU/mL Mixture of Urogenital Joyce    Comprehensive metabolic panel    Collection Time: 06/17/24  5:58 PM   Result Value Ref Range    Sodium 138 135 - 145 mmol/L    Potassium 4.3 3.4 - 5.3 mmol/L    Carbon Dioxide (CO2) 23 22 - 29 mmol/L    Anion Gap 15 7 - 15 mmol/L    Urea Nitrogen 30.7 (H) 8.0 - 23.0 mg/dL    Creatinine 1.44 (H) 0.67 - 1.17 mg/dL    GFR Estimate 49 (L) >60 mL/min/1.73m2    Calcium 9.0 8.8 -  10.2 mg/dL    Chloride 100 98 - 107 mmol/L    Glucose 139 (H) 70 - 99 mg/dL    Alkaline Phosphatase 155 (H) 40 - 150 U/L    AST 19 0 - 45 U/L    ALT 25 0 - 70 U/L    Protein Total 7.3 6.4 - 8.3 g/dL    Albumin 3.7 3.5 - 5.2 g/dL    Bilirubin Total 0.4 <=1.2 mg/dL   CBC (+ platelets, no diff)    Collection Time: 06/17/24  5:58 PM   Result Value Ref Range    WBC Count 10.2 4.0 - 11.0 10e3/uL    RBC Count 3.61 (L) 4.40 - 5.90 10e6/uL    Hemoglobin 10.2 (L) 13.3 - 17.7 g/dL    Hematocrit 32.6 (L) 40.0 - 53.0 %    MCV 90 78 - 100 fL    MCH 28.3 26.5 - 33.0 pg    MCHC 31.3 (L) 31.5 - 36.5 g/dL    RDW 16.4 (H) 10.0 - 15.0 %    Platelet Count 348 150 - 450 10e3/uL   CRP inflammation    Collection Time: 06/17/24  5:58 PM   Result Value Ref Range    CRP Inflammation 12.40 (H) <5.00 mg/L   Erythrocyte sedimentation rate auto    Collection Time: 06/17/24  5:58 PM   Result Value Ref Range    Erythrocyte Sedimentation Rate 66 (H) 0 - 20 mm/hr   Glucose by meter    Collection Time: 06/18/24  5:35 AM   Result Value Ref Range    GLUCOSE BY METER POCT 120 (H) 70 - 99 mg/dL   Basic metabolic panel    Collection Time: 06/18/24  8:45 AM   Result Value Ref Range    Sodium 137 135 - 145 mmol/L    Potassium 4.1 3.4 - 5.3 mmol/L    Chloride 99 98 - 107 mmol/L    Carbon Dioxide (CO2) 25 22 - 29 mmol/L    Anion Gap 13 7 - 15 mmol/L    Urea Nitrogen 28.5 (H) 8.0 - 23.0 mg/dL    Creatinine 1.25 (H) 0.67 - 1.17 mg/dL    GFR Estimate 58 (L) >60 mL/min/1.73m2    Calcium 9.1 8.8 - 10.2 mg/dL    Glucose 214 (H) 70 - 99 mg/dL   CBC with platelets    Collection Time: 06/18/24  8:45 AM   Result Value Ref Range    WBC Count 8.2 4.0 - 11.0 10e3/uL    RBC Count 3.35 (L) 4.40 - 5.90 10e6/uL    Hemoglobin 9.7 (L) 13.3 - 17.7 g/dL    Hematocrit 31.4 (L) 40.0 - 53.0 %    MCV 94 78 - 100 fL    MCH 29.0 26.5 - 33.0 pg    MCHC 30.9 (L) 31.5 - 36.5 g/dL    RDW 16.2 (H) 10.0 - 15.0 %    Platelet Count 284 150 - 450 10e3/uL   Hemoglobin A1c    Collection Time:  06/18/24  8:45 AM   Result Value Ref Range    Hemoglobin A1C 8.0 (H) <5.7 %   CRP inflammation    Collection Time: 06/18/24 10:29 AM   Result Value Ref Range    CRP Inflammation 10.61 (H) <5.00 mg/L   Erythrocyte sedimentation rate auto    Collection Time: 06/18/24 10:29 AM   Result Value Ref Range    Erythrocyte Sedimentation Rate 69 (H) 0 - 20 mm/hr   Blood Culture Arm, Left    Collection Time: 06/18/24 10:29 AM    Specimen: Arm, Left; Blood   Result Value Ref Range    Culture No Growth    Blood Culture Arm, Right    Collection Time: 06/18/24 10:38 AM    Specimen: Arm, Right; Blood   Result Value Ref Range    Culture No Growth    Glucose by meter    Collection Time: 06/18/24 11:45 AM   Result Value Ref Range    GLUCOSE BY METER POCT 143 (H) 70 - 99 mg/dL   Glucose by meter    Collection Time: 06/18/24  5:00 PM   Result Value Ref Range    GLUCOSE BY METER POCT 117 (H) 70 - 99 mg/dL   Glucose by meter    Collection Time: 06/18/24  8:49 PM   Result Value Ref Range    GLUCOSE BY METER POCT 209 (H) 70 - 99 mg/dL   Glucose by meter    Collection Time: 06/19/24  2:04 AM   Result Value Ref Range    GLUCOSE BY METER POCT 146 (H) 70 - 99 mg/dL   Basic metabolic panel    Collection Time: 06/19/24  7:10 AM   Result Value Ref Range    Sodium 135 135 - 145 mmol/L    Potassium 4.4 3.4 - 5.3 mmol/L    Chloride 101 98 - 107 mmol/L    Carbon Dioxide (CO2) 21 (L) 22 - 29 mmol/L    Anion Gap 13 7 - 15 mmol/L    Urea Nitrogen 23.3 (H) 8.0 - 23.0 mg/dL    Creatinine 1.16 0.67 - 1.17 mg/dL    GFR Estimate 64 >60 mL/min/1.73m2    Calcium 9.1 8.8 - 10.2 mg/dL    Glucose 150 (H) 70 - 99 mg/dL   CBC with platelets and differential    Collection Time: 06/19/24  7:10 AM   Result Value Ref Range    WBC Count 7.8 4.0 - 11.0 10e3/uL    RBC Count 3.31 (L) 4.40 - 5.90 10e6/uL    Hemoglobin 9.7 (L) 13.3 - 17.7 g/dL    Hematocrit 30.8 (L) 40.0 - 53.0 %    MCV 93 78 - 100 fL    MCH 29.3 26.5 - 33.0 pg    MCHC 31.5 31.5 - 36.5 g/dL    RDW 16.5 (H)  10.0 - 15.0 %    Platelet Count 289 150 - 450 10e3/uL    % Neutrophils 71 %    % Lymphocytes 15 %    % Monocytes 10 %    % Eosinophils 3 %    % Basophils 0 %    % Immature Granulocytes 2 %    NRBCs per 100 WBC 0 <1 /100    Absolute Neutrophils 5.5 1.6 - 8.3 10e3/uL    Absolute Lymphocytes 1.2 0.8 - 5.3 10e3/uL    Absolute Monocytes 0.7 0.0 - 1.3 10e3/uL    Absolute Eosinophils 0.2 0.0 - 0.7 10e3/uL    Absolute Basophils 0.0 0.0 - 0.2 10e3/uL    Absolute Immature Granulocytes 0.1 <=0.4 10e3/uL    Absolute NRBCs 0.0 10e3/uL   Glucose    Collection Time: 06/19/24  7:50 AM   Result Value Ref Range    Glucose 159 (H) 70 - 99 mg/dL   Glucose by meter    Collection Time: 06/19/24  8:20 AM   Result Value Ref Range    GLUCOSE BY METER POCT 153 (H) 70 - 99 mg/dL   Glucose by meter    Collection Time: 06/19/24 12:20 PM   Result Value Ref Range    GLUCOSE BY METER POCT 147 (H) 70 - 99 mg/dL   INR    Collection Time: 06/19/24  1:16 PM   Result Value Ref Range    INR 1.04 0.85 - 1.15   Anaerobic Bacterial Culture Routine    Collection Time: 06/19/24  3:41 PM    Specimen: Spine, Lumbar; Biopsy   Result Value Ref Range    Culture No anaerobic organisms isolated    Biopsy Aerobic Bacterial Culture Routine With Gram Stain    Collection Time: 06/19/24  3:48 PM    Specimen: Spine, Lumbar; Biopsy   Result Value Ref Range    Culture No Growth     Gram Stain Result No organisms seen     Gram Stain Result 3+ WBC seen    Anaerobic Bacterial Culture Routine    Collection Time: 06/19/24  3:48 PM    Specimen: Spine, Lumbar; Aspirate   Result Value Ref Range    Culture No anaerobic organisms isolated    Aspirate Aerobic Bacterial Culture Routine With Gram Stain    Collection Time: 06/19/24  3:48 PM    Specimen: Spine, Lumbar; Aspirate   Result Value Ref Range    Culture No Growth     Gram Stain Result No organisms seen     Gram Stain Result 3+ WBC seen    Glucose by meter    Collection Time: 06/19/24  4:17 PM   Result Value Ref Range    GLUCOSE  BY METER POCT 104 (H) 70 - 99 mg/dL   Glucose by meter    Collection Time: 06/19/24  9:38 PM   Result Value Ref Range    GLUCOSE BY METER POCT 172 (H) 70 - 99 mg/dL   Glucose by meter    Collection Time: 06/20/24  2:04 AM   Result Value Ref Range    GLUCOSE BY METER POCT 145 (H) 70 - 99 mg/dL   Glucose by meter    Collection Time: 06/20/24  7:25 AM   Result Value Ref Range    GLUCOSE BY METER POCT 180 (H) 70 - 99 mg/dL   Hemoglobin    Collection Time: 06/20/24  8:29 AM   Result Value Ref Range    Hemoglobin 10.9 (L) 13.3 - 17.7 g/dL   Basic metabolic panel    Collection Time: 06/20/24  8:29 AM   Result Value Ref Range    Sodium 136 135 - 145 mmol/L    Potassium 4.5 3.4 - 5.3 mmol/L    Chloride 99 98 - 107 mmol/L    Carbon Dioxide (CO2) 23 22 - 29 mmol/L    Anion Gap 14 7 - 15 mmol/L    Urea Nitrogen 21.5 8.0 - 23.0 mg/dL    Creatinine 1.17 0.67 - 1.17 mg/dL    GFR Estimate 63 >60 mL/min/1.73m2    Calcium 9.2 8.8 - 10.2 mg/dL    Glucose 172 (H) 70 - 99 mg/dL   Glucose by meter    Collection Time: 06/20/24 12:12 PM   Result Value Ref Range    GLUCOSE BY METER POCT 144 (H) 70 - 99 mg/dL   Glucose by meter    Collection Time: 06/20/24  4:57 PM   Result Value Ref Range    GLUCOSE BY METER POCT 145 (H) 70 - 99 mg/dL   Glucose by meter    Collection Time: 06/20/24  9:46 PM   Result Value Ref Range    GLUCOSE BY METER POCT 235 (H) 70 - 99 mg/dL   Glucose by meter    Collection Time: 06/21/24  2:58 AM   Result Value Ref Range    GLUCOSE BY METER POCT 154 (H) 70 - 99 mg/dL   Glucose by meter    Collection Time: 06/21/24  7:35 AM   Result Value Ref Range    GLUCOSE BY METER POCT 145 (H) 70 - 99 mg/dL   Creatinine    Collection Time: 06/21/24  9:05 AM   Result Value Ref Range    Creatinine 1.24 (H) 0.67 - 1.17 mg/dL    GFR Estimate 59 (L) >60 mL/min/1.73m2   Potassium    Collection Time: 06/21/24  9:05 AM   Result Value Ref Range    Potassium 4.1 3.4 - 5.3 mmol/L   Glucose by meter    Collection Time: 06/21/24 12:59 PM   Result  Value Ref Range    GLUCOSE BY METER POCT 110 (H) 70 - 99 mg/dL   Glucose by meter    Collection Time: 06/21/24  5:03 PM   Result Value Ref Range    GLUCOSE BY METER POCT 161 (H) 70 - 99 mg/dL   Glucose by meter    Collection Time: 06/21/24  9:16 PM   Result Value Ref Range    GLUCOSE BY METER POCT 207 (H) 70 - 99 mg/dL   Glucose by meter    Collection Time: 06/22/24  2:06 AM   Result Value Ref Range    GLUCOSE BY METER POCT 133 (H) 70 - 99 mg/dL   Glucose by meter    Collection Time: 06/22/24  7:26 AM   Result Value Ref Range    GLUCOSE BY METER POCT 143 (H) 70 - 99 mg/dL   Creatinine    Collection Time: 06/22/24  8:51 AM   Result Value Ref Range    Creatinine 1.26 (H) 0.67 - 1.17 mg/dL    GFR Estimate 58 (L) >60 mL/min/1.73m2   Vancomycin level    Collection Time: 06/22/24  8:51 AM   Result Value Ref Range    Vancomycin 17.7   ug/mL   Glucose by meter    Collection Time: 06/22/24 11:27 AM   Result Value Ref Range    GLUCOSE BY METER POCT 265 (H) 70 - 99 mg/dL   Glucose by meter    Collection Time: 06/22/24  5:05 PM   Result Value Ref Range    GLUCOSE BY METER POCT 103 (H) 70 - 99 mg/dL   Glucose by meter    Collection Time: 06/22/24  9:35 PM   Result Value Ref Range    GLUCOSE BY METER POCT 222 (H) 70 - 99 mg/dL   Glucose by meter    Collection Time: 06/23/24  2:06 AM   Result Value Ref Range    GLUCOSE BY METER POCT 130 (H) 70 - 99 mg/dL   Glucose by meter    Collection Time: 06/23/24  7:31 AM   Result Value Ref Range    GLUCOSE BY METER POCT 139 (H) 70 - 99 mg/dL   Creatinine    Collection Time: 06/23/24  7:36 AM   Result Value Ref Range    Creatinine 1.18 (H) 0.67 - 1.17 mg/dL    GFR Estimate 62 >60 mL/min/1.73m2   Glucose by meter    Collection Time: 06/23/24 12:29 PM   Result Value Ref Range    GLUCOSE BY METER POCT 145 (H) 70 - 99 mg/dL   Glucose by meter    Collection Time: 06/23/24 12:46 PM   Result Value Ref Range    GLUCOSE BY METER POCT 137 (H) 70 - 99 mg/dL   Vancomycin level    Collection Time:  06/23/24  4:17 PM   Result Value Ref Range    Vancomycin 17.4   ug/mL   Glucose by meter    Collection Time: 06/23/24  5:14 PM   Result Value Ref Range    GLUCOSE BY METER POCT 243 (H) 70 - 99 mg/dL   Glucose by meter    Collection Time: 06/23/24  9:17 PM   Result Value Ref Range    GLUCOSE BY METER POCT 199 (H) 70 - 99 mg/dL   Glucose by meter    Collection Time: 06/24/24  2:02 AM   Result Value Ref Range    GLUCOSE BY METER POCT 146 (H) 70 - 99 mg/dL   Glucose by meter    Collection Time: 06/24/24  7:36 AM   Result Value Ref Range    GLUCOSE BY METER POCT 155 (H) 70 - 99 mg/dL   Basic metabolic panel    Collection Time: 06/24/24  7:39 AM   Result Value Ref Range    Sodium 138 135 - 145 mmol/L    Potassium 4.5 3.4 - 5.3 mmol/L    Chloride 102 98 - 107 mmol/L    Carbon Dioxide (CO2) 23 22 - 29 mmol/L    Anion Gap 13 7 - 15 mmol/L    Urea Nitrogen 20.5 8.0 - 23.0 mg/dL    Creatinine 1.14 0.67 - 1.17 mg/dL    GFR Estimate 65 >60 mL/min/1.73m2    Calcium 9.0 8.8 - 10.2 mg/dL    Glucose 146 (H) 70 - 99 mg/dL   Hemoglobin    Collection Time: 06/24/24  7:39 AM   Result Value Ref Range    Hemoglobin 9.4 (L) 13.3 - 17.7 g/dL   Glucose by meter    Collection Time: 06/24/24 11:01 AM   Result Value Ref Range    GLUCOSE BY METER POCT 271 (H) 70 - 99 mg/dL   Glucose by meter    Collection Time: 06/24/24  5:42 PM   Result Value Ref Range    GLUCOSE BY METER POCT 123 (H) 70 - 99 mg/dL   Glucose by meter    Collection Time: 06/24/24  9:36 PM   Result Value Ref Range    GLUCOSE BY METER POCT 187 (H) 70 - 99 mg/dL   Glucose by meter    Collection Time: 06/25/24  2:00 AM   Result Value Ref Range    GLUCOSE BY METER POCT 135 (H) 70 - 99 mg/dL   Glucose by meter    Collection Time: 06/25/24  5:59 AM   Result Value Ref Range    GLUCOSE BY METER POCT 132 (H) 70 - 99 mg/dL   Glucose by meter    Collection Time: 06/25/24  7:43 AM   Result Value Ref Range    GLUCOSE BY METER POCT 126 (H) 70 - 99 mg/dL   Glucose by meter    Collection Time:  06/25/24 11:39 AM   Result Value Ref Range    GLUCOSE BY METER POCT 229 (H) 70 - 99 mg/dL        MEDICATIONS:  MED REC REQUIRED  Post Medication Reconciliation Status: discharge medications reconciled, continue medications without change          Review of your medicines            Accurate as of June 26, 2024  3:05 PM. If you have any questions, ask your nurse or doctor.                CONTINUE these medicines which have NOT CHANGED        Dose / Directions   acetaminophen 325 MG tablet  Commonly known as: TYLENOL  Used for: Acute right-sided low back pain with right-sided sciatica      Dose: 650 mg  Take 2 tablets (650 mg) by mouth every 6 hours as needed for pain (and adjunct with moderate or severe pain or per patient request)  Refills: 0     aspirin 81 MG EC tablet  Used for: Coronary artery disease involving native coronary artery of native heart without angina pectoris      Dose: 81 mg  Take 1 tablet (81 mg) by mouth daily  Refills: 0     colchicine 0.6 MG tablet  Commonly known as: COLCRYS  Used for: Gouty arthropathy      TAKE 1 TABLET DAILY  Quantity: 90 tablet  Refills: 3     diclofenac 1 % topical gel  Commonly known as: VOLTAREN      Dose: 4 g  Apply 4 g topically 4 times daily as needed for moderate pain Left knee  Refills: 0     emollient external cream  Used for: Rash      Apply topically 3 times daily  Quantity: 113 g  Refills: 3     ertapenem 1 GM vial  Commonly known as: INVanz  Indication: Infection of Bone and Bone Marrow  Used for: Lumbar discitis      Dose: 1 g  Inject 1 g into the vein every 24 hours for 42 doses  Refills: 0     famotidine 20 MG tablet  Commonly known as: PEPCID      Dose: 20 mg  Take 1 tablet (20 mg) by mouth 2 times daily as needed (itching)  Quantity: 30 tablet  Refills: 0     * Ferrous Gluconate 240 (27 Fe) MG Tabs      Dose: 1 tablet  Take 1 tablet by mouth daily  Refills: 0     * ferrous gluconate 324 (38 Fe) MG tablet  Commonly known as: FERGON  Used for: Iron  deficiency anemia, unspecified iron deficiency anemia type      Dose: 324 mg  Take 1 tablet (324 mg) by mouth daily  Refills: 0     fluticasone 50 MCG/ACT nasal spray  Commonly known as: FLONASE      Dose: 1-2 spray  Spray 1-2 sprays in nostril daily as needed  Refills: 0     gabapentin 100 MG capsule  Commonly known as: NEURONTIN  Used for: Acute right-sided low back pain with right-sided sciatica      Dose: 100 mg  Take 1 capsule (100 mg) by mouth 3 times daily  Refills: 0     glipiZIDE 2.5 MG 24 hr tablet  Commonly known as: GLUCOTROL XL      Dose: 2.5 mg  Take 2.5 mg by mouth daily  Refills: 0     HEMP OIL OR EXTRACT OR OTHER CBD CANNABINOID (NOT MEDICAL CANNABIS)      Dose: 1 chew tab  Take 1 chew tab by mouth at bedtime CBD gummy  Refills: 0     insulin glargine 100 UNIT/ML pen  Commonly known as: LANTUS PEN  Used for: 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  Refills: 0     lactobacillus rhamnosus (GG) capsule      Dose: 2 capsule  Take 2 capsules by mouth daily Noon  Refills: 0     Lidocaine 4 % Patch  Commonly known as: LIDOCARE      Dose: 1 patch  Place 1 patch onto the skin daily as needed To prevent lidocaine toxicity, patient should be patch free for 12 hrs daily.  Refills: 0     lisinopril 10 MG tablet  Commonly known as: ZESTRIL  Used for: Chronic diastolic heart failure (H)      Dose: 10 mg  Take 1 tablet (10 mg) by mouth every 24 hours  Quantity: 90 tablet  Refills: 3     melatonin 5 MG Caps      Dose: 5 mg  Take 5 mg by mouth at bedtime  Refills: 0     methocarbamol 500 MG tablet  Commonly known as: ROBAXIN  Used for: Acute right-sided low back pain with right-sided sciatica      Dose: 500 mg  Take 1 tablet (500 mg) by  mouth 3 times daily as needed for muscle spasms  Quantity: 10 tablet  Refills: 0     nortriptyline 10 MG capsule  Commonly known as: PAMELOR      Dose: 20 mg  Take 20 mg by mouth nightly as needed  Refills: 0     oxyCODONE 5 MG tablet  Commonly known as: ROXICODONE  Used for: Acute right-sided low back pain with right-sided sciatica      Dose: 2.5-5 mg  Take 0.5-1 tablets (2.5-5 mg) by mouth every 6 hours as needed for moderate to severe pain (2.5 mg for moderate pain, 5 mg for severe pain.)  Quantity: 12 tablet  Refills: 0     polyethylene glycol 17 GM/Dose powder  Commonly known as: MIRALAX  Used for: Chronic low back pain without sciatica, unspecified back pain laterality      Dose: 17 g  Take 17 g by mouth daily as needed for constipation  Quantity: 225 g  Refills: 0     Semaglutide 14 MG tablet  Commonly known as: RYBELSUS  Used for: Type 2 diabetes, HbA1c goal < 7% (H)      Dose: 14 mg  Take 14 mg by mouth daily  Quantity: 30 tablet  Refills: 3     senna-docusate 8.6-50 MG tablet  Commonly known as: SENOKOT-S/PERICOLACE  Used for: Constipation, unspecified constipation type      Dose: 1 tablet  Take 1 tablet by mouth 2 times daily as needed for constipation  Refills: 0     sodium bicarbonate 650 MG tablet  Used for: Acidosis      Dose: 1,950 mg  Take 3 tablets (1,950 mg) by mouth 3 times daily  Quantity: 180 tablet  Refills: 1     torsemide 20 MG tablet  Commonly known as: DEMADEX  Used for: Essential hypertension      Dose: 20 mg  TAKE 1 TABLET DAILY  Quantity: 90 tablet  Refills: 3     Tums 500 MG chewable tablet  Generic drug: calcium carbonate      Dose: 1,000 mg  Take 1,000 mg by mouth 3 times daily as needed for heartburn  Refills: 0     ursodiol 300 MG capsule  Commonly known as: ACTIGALL  Used for: Biliary stricture (H28)      Dose: 300 mg  Take 1 capsule (300 mg) by mouth 2 times daily  Quantity: 60 capsule  Refills: 0     vancomycin 1500 mg/250 mL IVPB  Commonly known as: VANCOCIN  Indication:  Infection of Bone and Bone Marrow  Used for: Lumbar discitis      Dose: 1,500 mg  Inject 1,500 mg into the vein every 24 hours for 41 days Please check every Monday CBC with diff, creatinine, vanco level, AST, and CRP. Every Thursday check creatinine and vanco level. Pharmacist to assist with dosing vanco with AUC. Fax results to [facility], [facility].  Refills: 0           * This list has 2 medication(s) that are the same as other medications prescribed for you. Read the directions carefully, and ask your doctor or other care provider to review them with you.                STOP taking      blood glucose monitoring meter device kit  Commonly known as: NO BRAND SPECIFIED  Stopped by: Darling Valdivia        Contour Next Test test strip  Generic drug: blood glucose  Stopped by: Darling Valdivia        loratadine 10 MG tablet  Commonly known as: CLARITIN  Stopped by: Darling Valdivia        Microlet Lancets Misc  Stopped by: Darling Valdivia                 ASSESSMENT/PLAN  Encounter Diagnoses   Name Primary?     Pain management Yes     Physical deconditioning      Severe sepsis (H)      Pain management as needed Tylenol, as needed oxycodone, diclofenac gel, lidocaine patch, methocarbamol,  spinal cord stimulator    Mood disorder on nortriptyline     Physical deconditioning  PT/OT    Gout on colchicine    Bone infection on ertapenem until 8/6/2024, and vancomycin until 8/4/2024 with follow-up with infectious disease and neurosurgery pharmacy to dose antibiotics    Anemia on ferrous gluconate hemoglobin 9.4 on 6/24/2024    Diabetes type 2 continue glipizide XL 2.5 mg daily, glargine 8 units subcu twice daily, semaglutide A1C 6/18/24 was 8.0      Electronically signed by: Darling Valdivia CNP       Sincerely,        Darling Valdivia CNP

## 2024-06-26 NOTE — PROGRESS NOTES
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  Acute on chronic back pain with RLE radiculopathy.  Severe spinal stenosis.  Physical deconditioning from medical illness, senile frailty.     History of Present Illness  Lance Ibrahim is an 79 year old male who presented with back pain.         Hospital Course  Lance Ibrahim is a 79 year old male with history of heart failure with preserved ejection fraction, hypertension, dyslipidemia, CKD, gout, BPH, type 2 diabetes and chronic low back pain for which he is folllowed by [facility] and has an implantable spine stimulator ([employer], MRI conditional 4/2024) in place. He initially presented to [facility] with acute on chronic, intractable pain with RLE radiculopathy. CT concerning for discitis-osteomyelitis at L4-5 with epidural/intraforaminal phlegmon vs abscess at L4-S1 with severe foraminal narrowing. Transferred to [facility] for Neurosurgery evaluation and ID consultation.      Hospitalization on 5/16-5/22/2024 due to severe sepsis related to UTI caused by Klebsiella oxytoca.           Assessment: Patient has transitioned to TCU/ARU for short term rehabilitation:    Facility Name: [facility]  Transition Communication:  Notified facility of Primary Care- Care Coordination support via Epic fax.    Plan: Care Coordinator will await notification from facility staff informing of patient's discharge plans/needs. Care Coordinator will review chart and outreach to facility staff every 4 weeks and as needed.     Bonnie Kilpatrick,   [facility]  [phone]

## 2024-06-26 NOTE — LETTER
[facility]       To:  [facility]            Please give to facility      From:   Bonnie Kilpatrick [facility]  Care Coordinator   [facility]   P: [phone]  [email or web address]        Patient Name:    Lance Ibrahim   YOB: 1945   Admit date: 6-          Information Needed:  Please contact me when the patient will discharge (or if they will move to long term care)- include the discharge date, disposition, and main diagnosis       If the patient is discharged with home care services, please provide the name of the agency    Also- Please inform me if a care conference is being held.     Phone or Email with information                              Thank you

## 2024-06-27 ENCOUNTER — TELEPHONE (OUTPATIENT)
Dept: GERIATRICS | Facility: CLINIC | Age: 79
End: 2024-06-27

## 2024-06-27 LAB
AST SERPL W P-5'-P-CCNC: 30 U/L (ref 0–45)
BASOPHILS # BLD AUTO: 0.1 10E3/UL (ref 0–0.2)
BASOPHILS NFR BLD AUTO: 1 %
CREAT SERPL-MCNC: 1.37 MG/DL (ref 0.67–1.17)
CRP SERPL-MCNC: 52.2 MG/L
EGFRCR SERPLBLD CKD-EPI 2021: 52 ML/MIN/1.73M2
EOSINOPHIL # BLD AUTO: 0.2 10E3/UL (ref 0–0.7)
EOSINOPHIL NFR BLD AUTO: 3 %
ERYTHROCYTE [DISTWIDTH] IN BLOOD BY AUTOMATED COUNT: 16.9 % (ref 10–15)
HCT VFR BLD AUTO: 31.4 % (ref 40–53)
HGB BLD-MCNC: 9.4 G/DL (ref 13.3–17.7)
IMM GRANULOCYTES # BLD: 0.2 10E3/UL
IMM GRANULOCYTES NFR BLD: 3 %
LYMPHOCYTES # BLD AUTO: 1.2 10E3/UL (ref 0.8–5.3)
LYMPHOCYTES NFR BLD AUTO: 17 %
MCH RBC QN AUTO: 28.1 PG (ref 26.5–33)
MCHC RBC AUTO-ENTMCNC: 29.9 G/DL (ref 31.5–36.5)
MCV RBC AUTO: 94 FL (ref 78–100)
MONOCYTES # BLD AUTO: 0.7 10E3/UL (ref 0–1.3)
MONOCYTES NFR BLD AUTO: 10 %
NEUTROPHILS # BLD AUTO: 4.6 10E3/UL (ref 1.6–8.3)
NEUTROPHILS NFR BLD AUTO: 66 %
NRBC # BLD AUTO: 0 10E3/UL
NRBC BLD AUTO-RTO: 0 /100
PLATELET # BLD AUTO: 341 10E3/UL (ref 150–450)
RBC # BLD AUTO: 3.35 10E6/UL (ref 4.4–5.9)
VANCOMYCIN SERPL-MCNC: 29.7 UG/ML
WBC # BLD AUTO: 6.8 10E3/UL (ref 4–11)

## 2024-06-27 PROCEDURE — 86140 C-REACTIVE PROTEIN: CPT | Mod: ORL | Performed by: FAMILY MEDICINE

## 2024-06-27 PROCEDURE — 36415 COLL VENOUS BLD VENIPUNCTURE: CPT | Mod: ORL | Performed by: FAMILY MEDICINE

## 2024-06-27 PROCEDURE — P9604 ONE-WAY ALLOW PRORATED TRIP: HCPCS | Mod: ORL | Performed by: FAMILY MEDICINE

## 2024-06-27 PROCEDURE — 80202 ASSAY OF VANCOMYCIN: CPT | Mod: ORL | Performed by: FAMILY MEDICINE

## 2024-06-27 PROCEDURE — 84450 TRANSFERASE (AST) (SGOT): CPT | Mod: ORL | Performed by: FAMILY MEDICINE

## 2024-06-27 PROCEDURE — 85025 COMPLETE CBC W/AUTO DIFF WBC: CPT | Mod: ORL | Performed by: FAMILY MEDICINE

## 2024-06-27 PROCEDURE — 82565 ASSAY OF CREATININE: CPT | Mod: ORL | Performed by: FAMILY MEDICINE

## 2024-06-27 NOTE — TELEPHONE ENCOUNTER
"[facility] Geriatrics Lab Note     Provider: AZCH Mckenzie  Facility: [facility]  Facility Type:  TCU    Allergies   Allergen Reactions    Ancef [Cefazolin] Rash    Cephalexin Itching and Rash     Allergic reaction required hospitalization 4/2024    Penicillins Anaphylaxis    Sulfa Antibiotics      \"itchy rash, swelling of face and hands\"       Labs Reviewed by provider: Vanco - 29.7, AST, Cr, C     Verbal Order/Direction given by Provider: Call infectious disease about lab results and pharmacy for Vanco dosing    Provider giving Order:  ZACH Mckenzie    Verbal Order given to: Radha Galvan RN  "

## 2024-07-01 ENCOUNTER — LAB REQUISITION (OUTPATIENT)
Dept: LAB | Facility: CLINIC | Age: 79
End: 2024-07-01
Payer: COMMERCIAL

## 2024-07-01 DIAGNOSIS — M46.26 OSTEOMYELITIS OF VERTEBRA, LUMBAR REGION (H): ICD-10-CM

## 2024-07-02 ENCOUNTER — TRANSITIONAL CARE UNIT VISIT (OUTPATIENT)
Dept: GERIATRICS | Facility: CLINIC | Age: 79
End: 2024-07-02
Payer: COMMERCIAL

## 2024-07-02 ENCOUNTER — TELEPHONE (OUTPATIENT)
Dept: GERIATRICS | Facility: CLINIC | Age: 79
End: 2024-07-02

## 2024-07-02 ENCOUNTER — LAB REQUISITION (OUTPATIENT)
Dept: LAB | Facility: CLINIC | Age: 79
End: 2024-07-02
Payer: COMMERCIAL

## 2024-07-02 VITALS
SYSTOLIC BLOOD PRESSURE: 153 MMHG | HEIGHT: 72 IN | RESPIRATION RATE: 18 BRPM | HEART RATE: 101 BPM | BODY MASS INDEX: 26.28 KG/M2 | DIASTOLIC BLOOD PRESSURE: 73 MMHG | TEMPERATURE: 97.9 F | WEIGHT: 194 LBS

## 2024-07-02 DIAGNOSIS — M46.40 DISCITIS, UNSPECIFIED SPINAL REGION: ICD-10-CM

## 2024-07-02 DIAGNOSIS — R53.81 PHYSICAL DECONDITIONING: Primary | ICD-10-CM

## 2024-07-02 DIAGNOSIS — M46.26 OSTEOMYELITIS OF VERTEBRA, LUMBAR REGION (H): ICD-10-CM

## 2024-07-02 DIAGNOSIS — I50.22 CHRONIC SYSTOLIC CONGESTIVE HEART FAILURE (H): ICD-10-CM

## 2024-07-02 DIAGNOSIS — G06.2 EXTRADURAL AND SUBDURAL ABSCESS, UNSPECIFIED: ICD-10-CM

## 2024-07-02 DIAGNOSIS — R52 PAIN MANAGEMENT: ICD-10-CM

## 2024-07-02 PROBLEM — G47.00 INSOMNIA: Status: ACTIVE | Noted: 2024-03-04

## 2024-07-02 PROBLEM — I63.433: Status: ACTIVE | Noted: 2024-03-04

## 2024-07-02 PROBLEM — I13.10 HYPERTENSIVE HEART AND KIDNEY DISEASE: Status: ACTIVE | Noted: 2024-03-04

## 2024-07-02 PROBLEM — D50.9 IRON DEFICIENCY ANEMIA: Status: ACTIVE | Noted: 2024-03-04

## 2024-07-02 PROBLEM — M19.92 POST-TRAUMATIC OSTEOARTHRITIS: Status: ACTIVE | Noted: 2024-03-04

## 2024-07-02 PROBLEM — M17.12 OSTEOARTHRITIS OF LEFT KNEE: Status: ACTIVE | Noted: 2024-03-04

## 2024-07-02 LAB
CREAT SERPL-MCNC: 1.59 MG/DL (ref 0.67–1.17)
CRP SERPL-MCNC: 74.3 MG/L
EGFRCR SERPLBLD CKD-EPI 2021: 44 ML/MIN/1.73M2
ERYTHROCYTE [DISTWIDTH] IN BLOOD BY AUTOMATED COUNT: 17.3 % (ref 10–15)
GLUCOSE SERPL-MCNC: 138 MG/DL (ref 70–99)
HCT VFR BLD AUTO: 31.4 % (ref 40–53)
HGB BLD-MCNC: 9.4 G/DL (ref 13.3–17.7)
MCH RBC QN AUTO: 28.1 PG (ref 26.5–33)
MCHC RBC AUTO-ENTMCNC: 29.9 G/DL (ref 31.5–36.5)
MCV RBC AUTO: 94 FL (ref 78–100)
PLATELET # BLD AUTO: 393 10E3/UL (ref 150–450)
RBC # BLD AUTO: 3.34 10E6/UL (ref 4.4–5.9)
VANCOMYCIN SERPL-MCNC: 25.9 UG/ML
WBC # BLD AUTO: 8 10E3/UL (ref 4–11)

## 2024-07-02 PROCEDURE — 85027 COMPLETE CBC AUTOMATED: CPT | Mod: ORL | Performed by: FAMILY MEDICINE

## 2024-07-02 PROCEDURE — 86140 C-REACTIVE PROTEIN: CPT | Mod: ORL | Performed by: FAMILY MEDICINE

## 2024-07-02 PROCEDURE — 99309 SBSQ NF CARE MODERATE MDM 30: CPT | Performed by: NURSE PRACTITIONER

## 2024-07-02 PROCEDURE — 82565 ASSAY OF CREATININE: CPT | Mod: ORL | Performed by: FAMILY MEDICINE

## 2024-07-02 PROCEDURE — 82947 ASSAY GLUCOSE BLOOD QUANT: CPT | Mod: ORL | Performed by: FAMILY MEDICINE

## 2024-07-02 PROCEDURE — P9604 ONE-WAY ALLOW PRORATED TRIP: HCPCS | Mod: ORL | Performed by: FAMILY MEDICINE

## 2024-07-02 PROCEDURE — 80202 ASSAY OF VANCOMYCIN: CPT | Mod: ORL | Performed by: FAMILY MEDICINE

## 2024-07-02 PROCEDURE — 36415 COLL VENOUS BLD VENIPUNCTURE: CPT | Mod: ORL | Performed by: FAMILY MEDICINE

## 2024-07-02 NOTE — LETTER
7/2/2024      Lance Ibrahim  [address]  [facility] [address]        [facility]  Chief Complaint   Patient presents with     MARK ANTHONYECK     [facility] Medical Record Number:  [id number]  Place of Service where encounter took place:  [facility] ([facility]) [[id number]]  Code Status:  unknown     HISTORY:      HPI:  Lance Ibrahim  is 79 year old (1945) undergoing physical and occupational therapy.  He is with history of heart failure with preserved ejection fraction, hypertension, dyslipidemia, CKD, gout, BPH, type 2 diabetes and chronic low back pain for which he is folllowed by [facility] and has an implantable spine stimulator ([employer], MRI conditional 4/2024) in place.  Excerpted from records He initially presented to [facility] with acute on chronic, intractable pain with RLE radiculopathy.  For CT concerning for discitis-osteomyelitis at L4-5 with epidural/intraforaminal phlegmon vs abscess at L4-S1 with severe foraminal narrowing. Transferred to [facility] for Neurosurgery evaluation and ID consultation.      Hospitalization on 5/16-5/22/2024 due to severe sepsis related to UTI caused by Klebsiella oxytoca.     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  Acute on chronic back pain with RLE radiculopathy.  Severe spinal stenosis.  --Presented with acute on chronic back pain located on the right side of his back.  Significant tenderness to palpation at approximately the L3-L4 level on the right side with associated RLE radiculopathy. No bladder or bowel incontinence, no saddle anesthesia. Uses a walker for shorter distances and wheelchair for longer distances at baseline. No recent trauma or falls, but did get a trigger point injection by [facility] on 6/14.   *Afebrile since admission, hemodynamically stable.  WBC WNL. CRP 12.4.   *CT lumbar spine as above (review formal report for complete  details)   *MRI lumbar spine Discitis osteomyelitis at L4-5. 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 At L4-5, severe spinal stenosis secondary to the epidural abscess. At L5-S1, moderate spinal stenosis secondary to the epidural abscess.  --Status post IR guided L4-L5 disc aspiration and L4 biopsy. 1 ml of rust colored cloudy fluid aspirated from L4-5 disc space. Large core specimen also obtained from L4 interior endplate  --Started on IV vancomycin on 6/19 post IR guided aspiration.    Blood cultures no growth to date.  Intraoperative biopsy, aspirate - no growth to date.  Noted allergies to penicillin, cephalosporins and sulfa.   --Infectious disease followed - Anticipate at least 6 weeks of IV antibiotics.   --Neurosurgery signed off, no surgical intervention.  Continue intravenous vancomycin (6/19).  PICC line placed 6/23.    Added ertapenem [6/24] for gram-negative coverage given recent Klebsiella bacteremia.  Vancomycin until 7/31, Ertapenem until 8/5    plan to repeat MRI in 6 weeks.     Bacteriuria with cultures negative  Recent severe sepsis in the setting of Klebsiella UTI:   Hospitalization on 5/16/2024-5/22/2024 due to severe sepsis related to UTI caused by Klebsiella oxytoca. Patient was on IV Vanco, Levaquin, Cefepime, and Ceftriaxone in the hospital. He was discharged on oral Cefdinir and Levaquin. No current sx of UTI.   -UA with trace blood in urine, few bacteria otherwise unremarkable.   Urine cultures with less than 10,000 mixed fred.  CT A/P on admission negative for renal calculi or hydronephrosis, note left renal cyst.   --On ertapenem as above.    Today he is seen to review VS, Labs, routine visit   He denied chest pain shortness of breath cough congestion constipation or diarrhea pain controlled with current medications.  He is with a spinal cord stimulator right lower back in which she reports he charges  weekly.  He will continue on IV antibiotics until 8/6/2024.   Pharmacy to dose vancomycin.  labs reviewed hemoglobin 10.0. A1c 2/20/2024 8.0 he will continue on weekly labs and daily weights for CHF however he denies this diagnosis.  He is with left knee weakness and difficulty ambulating. His left knee surgery was postponed until infection is clear.  Oxygen decreased then into the upper 80s he was placed on 2 L nasal cannula lung sounds coarse on the left he will have a chest x-ray      ALLERGIES:Ancef [cefazolin], Cephalexin, Penicillins, and Sulfa antibiotics    PAST MEDICAL HISTORY:   Past Medical History:   Diagnosis Date     Anemia      Arthritis     osteoarthritis knees     BPH      CAD (coronary artery disease)     subtotal occlusion in the small distal LAD      Cardiomyopathy      Cerebral artery occlusion with cerebral infarction     TIA 1993, no residual     Cervicalgia      CHF (congestive heart failure) (H)      Chronic kidney disease      Chronic low back pain      Chronic rhinitis      Gouty arthropathy     hands     Hyperlipidemia      Hypertension      Kidney stone      Nocturia      Obesity      Osteomyelitis (H) 08/2023    Foot     PVD (peripheral vascular disease)      Sleep apnea     doesn't use cpap     TIA (transient ischaemic attack) 1993     Type 2 diabetes mellitus - 11/08/2018     Ureteral stone        PAST SURGICAL HISTORY:   has a past surgical history that includes Diastasis recti repair (1985); Ventral hernia repair NOS (1987); ORIF Shoulder (Left); Colonoscopy (03/09/2013); hernia repair (07/13/2004); Foot surgery (04/2013); Amputate toe(s) (Left, 10/15/2018); Arthroplasty knee (Right, 12/07/2018); Heart Catheterization with Possible Intervention (Left, 03/05/2019); Extracapsular cataract extration with intraocular lens implant (Left, 03/13/2017); Extracapsular cataract extration with intraocular lens implant (Right); Amputate foot (Right, 08/07/2023); Esophagoscopy, gastroscopy,  duodenoscopy (EGD), combined (N/A, 04/30/2024); Endoscopic retrograde cholangiopancreatogram (N/A, 04/30/2024); Spinal Cord Stimulator Implant (04/03/2024); Prostate surgery (02/2024); and IR Disc Aspriation Injection (6/19/2024).    FAMILY HISTORY: family history includes Alcohol/Drug in his paternal grandfather; Cancer in his father; Diabetes in his maternal grandfather; Family History Negative in his mother.    SOCIAL HISTORY:  reports that he quit smoking about 31 years ago. His smoking use included cigarettes. He has never used smokeless tobacco. He reports that he does not currently use alcohol. He reports that he does not use drugs.    ROS:  Constitutional: Negative for activity change, appetite change, fatigue and fever.   HENT: Negative for congestion.    Respiratory: Negative for cough, shortness of breath and wheezing.    Cardiovascular: Negative for chest pain and leg swelling.   Gastrointestinal: Negative for abdominal distention, abdominal pain, constipation, diarrhea and nausea.   Genitourinary: Negative for dysuria.   Musculoskeletal: positive  for arthralgia. Positive  for back pain.   Skin: Negative for color change and wound. PICC LINE    Neurological: Negative for dizziness.   Psychiatric/Behavioral: Negative for agitation, behavioral problems and confusion.     Physical Exam:  Constitutional:       Appearance: Patient is well-developed.   HENT:      Head: Normocephalic.   Eyes:      Conjunctiva/sclera: Conjunctivae normal.   Neck:      Musculoskeletal: Normal range of motion.   Cardiovascular:      Rate and Rhythm: Normal rate and regular rhythm.      Heart sounds: Normal heart sounds. No murmur.   Pulmonary:      Effort: No respiratory distress.      Breath sounds: Normal breath sounds.  Lung sounds coarse on the left no wheezing or rales.   Abdominal:      General: Bowel sounds are normal. There is no distension.      Palpations: Abdomen is soft.      Tenderness: There is no abdominal  tenderness.   Musculoskeletal:       Normal range of motion.  decreased ROM LUE and LLE   Skin:General:        Skin is warm.   Neurological:         Mental Status: Patient is alert and oriented to person, place, and time.   Psychiatric:         Behavior: Behavior normal.     Vitals:BP (!) 153/73   Pulse 101   Temp 97.9  F (36.6  C)   Resp 18   Ht 1.829 m (6')   Wt 88 kg (194 lb)   BMI 26.31 kg/m   and Body mass index is 26.31 kg/m .    Lab/Diagnostic data:   Recent Results (from the past 240 hour(s))   Glucose by meter    Collection Time: 06/24/24  5:42 PM   Result Value Ref Range    GLUCOSE BY METER POCT 123 (H) 70 - 99 mg/dL   Glucose by meter    Collection Time: 06/24/24  9:36 PM   Result Value Ref Range    GLUCOSE BY METER POCT 187 (H) 70 - 99 mg/dL   Glucose by meter    Collection Time: 06/25/24  2:00 AM   Result Value Ref Range    GLUCOSE BY METER POCT 135 (H) 70 - 99 mg/dL   Glucose by meter    Collection Time: 06/25/24  5:59 AM   Result Value Ref Range    GLUCOSE BY METER POCT 132 (H) 70 - 99 mg/dL   Glucose by meter    Collection Time: 06/25/24  7:43 AM   Result Value Ref Range    GLUCOSE BY METER POCT 126 (H) 70 - 99 mg/dL   Glucose by meter    Collection Time: 06/25/24 11:39 AM   Result Value Ref Range    GLUCOSE BY METER POCT 229 (H) 70 - 99 mg/dL   CRP inflammation    Collection Time: 06/27/24 11:06 AM   Result Value Ref Range    CRP Inflammation 52.20 (H) <5.00 mg/L   Creatinine    Collection Time: 06/27/24 11:06 AM   Result Value Ref Range    Creatinine 1.37 (H) 0.67 - 1.17 mg/dL    GFR Estimate 52 (L) >60 mL/min/1.73m2   AST    Collection Time: 06/27/24 11:06 AM   Result Value Ref Range    AST 30 0 - 45 U/L   Vancomycin level    Collection Time: 06/27/24 11:06 AM   Result Value Ref Range    Vancomycin 29.7 (HH)   ug/mL   CBC with platelets and differential    Collection Time: 06/27/24 11:06 AM   Result Value Ref Range    WBC Count 6.8 4.0 - 11.0 10e3/uL    RBC Count 3.35 (L) 4.40 - 5.90 10e6/uL     Hemoglobin 9.4 (L) 13.3 - 17.7 g/dL    Hematocrit 31.4 (L) 40.0 - 53.0 %    MCV 94 78 - 100 fL    MCH 28.1 26.5 - 33.0 pg    MCHC 29.9 (L) 31.5 - 36.5 g/dL    RDW 16.9 (H) 10.0 - 15.0 %    Platelet Count 341 150 - 450 10e3/uL    % Neutrophils 66 %    % Lymphocytes 17 %    % Monocytes 10 %    % Eosinophils 3 %    % Basophils 1 %    % Immature Granulocytes 3 %    NRBCs per 100 WBC 0 <1 /100    Absolute Neutrophils 4.6 1.6 - 8.3 10e3/uL    Absolute Lymphocytes 1.2 0.8 - 5.3 10e3/uL    Absolute Monocytes 0.7 0.0 - 1.3 10e3/uL    Absolute Eosinophils 0.2 0.0 - 0.7 10e3/uL    Absolute Basophils 0.1 0.0 - 0.2 10e3/uL    Absolute Immature Granulocytes 0.2 <=0.4 10e3/uL    Absolute NRBCs 0.0 10e3/uL   Creatinine    Collection Time: 07/02/24  6:26 AM   Result Value Ref Range    Creatinine 1.59 (H) 0.67 - 1.17 mg/dL    GFR Estimate 44 (L) >60 mL/min/1.73m2   Vancomycin level    Collection Time: 07/02/24  6:26 AM   Result Value Ref Range    Vancomycin 25.9 (HH)   ug/mL   CBC with platelets    Collection Time: 07/02/24  6:26 AM   Result Value Ref Range    WBC Count 8.0 4.0 - 11.0 10e3/uL    RBC Count 3.34 (L) 4.40 - 5.90 10e6/uL    Hemoglobin 9.4 (L) 13.3 - 17.7 g/dL    Hematocrit 31.4 (L) 40.0 - 53.0 %    MCV 94 78 - 100 fL    MCH 28.1 26.5 - 33.0 pg    MCHC 29.9 (L) 31.5 - 36.5 g/dL    RDW 17.3 (H) 10.0 - 15.0 %    Platelet Count 393 150 - 450 10e3/uL   Glucose (OUTREACH)    Collection Time: 07/02/24  6:26 AM   Result Value Ref Range    Glucose 138 (H) 70 - 99 mg/dL   CRP inflammation    Collection Time: 07/02/24  6:26 AM   Result Value Ref Range    CRP Inflammation 74.30 (H) <5.00 mg/L   CRP inflammation    Collection Time: 07/03/24  8:40 AM   Result Value Ref Range    CRP Inflammation 53.20 (H) <5.00 mg/L   Creatinine    Collection Time: 07/03/24  8:40 AM   Result Value Ref Range    Creatinine 1.58 (H) 0.67 - 1.17 mg/dL    GFR Estimate 44 (L) >60 mL/min/1.73m2   AST    Collection Time: 07/03/24  8:40 AM   Result Value Ref  Range    AST 30 0 - 45 U/L   Vancomycin level    Collection Time: 07/03/24  8:40 AM   Result Value Ref Range    Vancomycin 28.0 (HH)   ug/mL   CBC with platelets and differential    Collection Time: 07/03/24  8:40 AM   Result Value Ref Range    WBC Count 8.5 4.0 - 11.0 10e3/uL    RBC Count 3.57 (L) 4.40 - 5.90 10e6/uL    Hemoglobin 10.0 (L) 13.3 - 17.7 g/dL    Hematocrit 33.8 (L) 40.0 - 53.0 %    MCV 95 78 - 100 fL    MCH 28.0 26.5 - 33.0 pg    MCHC 29.6 (L) 31.5 - 36.5 g/dL    RDW 17.6 (H) 10.0 - 15.0 %    Platelet Count 458 (H) 150 - 450 10e3/uL    % Neutrophils 72 %    % Lymphocytes 15 %    % Monocytes 9 %    % Eosinophils 2 %    % Basophils 1 %    % Immature Granulocytes 1 %    NRBCs per 100 WBC 0 <1 /100    Absolute Neutrophils 6.1 1.6 - 8.3 10e3/uL    Absolute Lymphocytes 1.3 0.8 - 5.3 10e3/uL    Absolute Monocytes 0.8 0.0 - 1.3 10e3/uL    Absolute Eosinophils 0.2 0.0 - 0.7 10e3/uL    Absolute Basophils 0.1 0.0 - 0.2 10e3/uL    Absolute Immature Granulocytes 0.1 <=0.4 10e3/uL    Absolute NRBCs 0.0 10e3/uL        MEDICATIONS:  MED REC REQUIRED  Post Medication Reconciliation Status: discharge medications reconciled, continue medications without change          Review of your medicines            Accurate as of July 2, 2024 11:59 PM. If you have any questions, ask your nurse or doctor.                CONTINUE these medicines which may have CHANGED, or have new prescriptions. If we are uncertain of the size of tablets/capsules you have at home, strength may be listed as something that might have changed.        Dose / Directions   emollient external cream  This may have changed:   when to take this  reasons to take this  Used for: Rash      Apply topically 3 times daily  Quantity: 113 g  Refills: 3     ferrous gluconate 324 (38 Fe) MG tablet  Commonly known as: FERGON  This may have changed: Another medication with the same name was removed. Continue taking this medication, and follow the directions you see  here.  Used for: Iron deficiency anemia, unspecified iron deficiency anemia type  Changed by: Darling Valdivia      Dose: 324 mg  Take 1 tablet (324 mg) by mouth daily  Refills: 0            CONTINUE these medicines which have NOT CHANGED        Dose / Directions   acetaminophen 325 MG tablet  Commonly known as: TYLENOL  Used for: Acute right-sided low back pain with right-sided sciatica      Dose: 650 mg  Take 2 tablets (650 mg) by mouth every 6 hours as needed for pain (and adjunct with moderate or severe pain or per patient request)  Refills: 0     aspirin 81 MG EC tablet  Used for: Coronary artery disease involving native coronary artery of native heart without angina pectoris      Dose: 81 mg  Take 1 tablet (81 mg) by mouth daily  Refills: 0     colchicine 0.6 MG tablet  Commonly known as: COLCRYS  Used for: Gouty arthropathy      TAKE 1 TABLET DAILY  Quantity: 90 tablet  Refills: 3     diclofenac 1 % topical gel  Commonly known as: VOLTAREN      Dose: 4 g  Apply 4 g topically 4 times daily as needed for moderate pain Left knee  Refills: 0     ertapenem 1 GM vial  Commonly known as: INVanz  Indication: Infection of Bone and Bone Marrow  Used for: Lumbar discitis      Dose: 1 g  Inject 1 g into the vein every 24 hours for 42 doses  Refills: 0     famotidine 20 MG tablet  Commonly known as: PEPCID      Dose: 20 mg  Take 1 tablet (20 mg) by mouth 2 times daily as needed (itching)  Quantity: 30 tablet  Refills: 0     fluticasone 50 MCG/ACT nasal spray  Commonly known as: FLONASE      Dose: 1-2 spray  Spray 1-2 sprays in nostril daily as needed  Refills: 0     gabapentin 100 MG capsule  Commonly known as: NEURONTIN  Used for: Acute right-sided low back pain with right-sided sciatica      Dose: 100 mg  Take 1 capsule (100 mg) by mouth 3 times daily  Refills: 0     glipiZIDE 2.5 MG 24 hr tablet  Commonly known as: GLUCOTROL XL      Dose: 2.5 mg  Take 2.5 mg by mouth daily  Refills: 0     HEMP OIL OR EXTRACT OR OTHER CBD  CANNABINOID (NOT MEDICAL CANNABIS)      Dose: 1 chew tab  Take 1 chew tab by mouth at bedtime CBD gummy  Refills: 0     insulin glargine 100 UNIT/ML pen  Commonly known as: LANTUS PEN  Used for: 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  Refills: 0     lactobacillus rhamnosus (GG) capsule      Dose: 2 capsule  Take 2 capsules by mouth daily Noon  Refills: 0     * Lidocaine 4 % Patch  Commonly known as: LIDOCARE      Dose: 1 patch  Place 1 patch onto the skin daily as needed To prevent lidocaine toxicity, patient should be patch free for 12 hrs daily.  Refills: 0     * Lidocaine 4 % Patch  Commonly known as: LIDOCARE      Dose: 1 patch  Place 1 patch onto the skin every 24 hours To prevent lidocaine toxicity, patient should be patch free for 12 hrs daily.  Refills: 0     lisinopril 10 MG tablet  Commonly known as: ZESTRIL  Used for: Chronic diastolic heart failure (H)      Dose: 10 mg  Take 1 tablet (10 mg) by mouth every 24 hours  Quantity: 90 tablet  Refills: 3     melatonin 5 MG Caps      Dose: 5 mg  Take 5 mg by mouth at bedtime  Refills: 0     methocarbamol 500 MG tablet  Commonly known as: ROBAXIN  Used for: Acute right-sided low back pain with right-sided sciatica      Dose: 500 mg  Take 1 tablet (500 mg) by mouth 3 times daily as needed for muscle spasms  Quantity: 10 tablet  Refills: 0     nortriptyline 10 MG capsule  Commonly known as: PAMELOR      Dose: 20 mg  Take 20 mg by mouth nightly as needed  Refills: 0     oxyCODONE 5 MG tablet  Commonly known as: ROXICODONE  Used for: Acute right-sided low back pain with right-sided sciatica      Dose: 2.5-5 mg  Take 0.5-1 tablets (2.5-5 mg) by mouth every 6 hours as needed for moderate to  severe pain (2.5 mg for moderate pain, 5 mg for severe pain.)  Quantity: 12 tablet  Refills: 0     polyethylene glycol 17 GM/Dose powder  Commonly known as: MIRALAX  Used for: Chronic low back pain without sciatica, unspecified back pain laterality      Dose: 17 g  Take 17 g by mouth daily as needed for constipation  Quantity: 225 g  Refills: 0     Semaglutide 14 MG tablet  Commonly known as: RYBELSUS  Used for: Type 2 diabetes, HbA1c goal < 7% (H)      Dose: 14 mg  Take 14 mg by mouth daily  Quantity: 30 tablet  Refills: 3     senna-docusate 8.6-50 MG tablet  Commonly known as: SENOKOT-S/PERICOLACE  Used for: Constipation, unspecified constipation type      Dose: 1 tablet  Take 1 tablet by mouth 2 times daily as needed for constipation  Refills: 0     sodium bicarbonate 650 MG tablet  Used for: Acidosis      Dose: 1,950 mg  Take 3 tablets (1,950 mg) by mouth 3 times daily  Quantity: 180 tablet  Refills: 1     torsemide 20 MG tablet  Commonly known as: DEMADEX  Used for: Essential hypertension      Dose: 20 mg  TAKE 1 TABLET DAILY  Quantity: 90 tablet  Refills: 3     Tums 500 MG chewable tablet  Generic drug: calcium carbonate      Dose: 1,000 mg  Take 1,000 mg by mouth 3 times daily as needed for heartburn  Refills: 0     ursodiol 300 MG capsule  Commonly known as: ACTIGALL  Used for: Biliary stricture (H28)      Dose: 300 mg  Take 1 capsule (300 mg) by mouth 2 times daily  Quantity: 60 capsule  Refills: 0     vancomycin 1500 mg/250 mL IVPB  Commonly known as: VANCOCIN  Indication: Infection of Bone and Bone Marrow  Used for: Lumbar discitis      Dose: 1,500 mg  Inject 1,500 mg into the vein every 24 hours for 41 days Please check every Monday CBC with diff, creatinine, vanco level, AST, and CRP. Every Thursday check creatinine and vanco level. Pharmacist to assist with dosing vanco with AUC. Fax results to [facility].  Refills: 0           * This list has 2 medication(s) that are the same as other  medications prescribed for you. Read the directions carefully, and ask your doctor or other care provider to review them with you.                  ASSESSMENT/PLAN  Encounter Diagnoses   Name Primary?     Physical deconditioning Yes     Pain management      Chronic systolic congestive heart failure (H)      Discitis, unspecified spinal region        Pain management as needed Tylenol, as needed oxycodone, diclofenac gel, lidocaine patch, methocarbamol,  spinal cord stimulator    Mood disorder on nortriptyline     Physical deconditioning  PT/OT    Gout on colchicine    Bone infection on ertapenem until 8/6/2024, and vancomycin until 8/4/2024 with follow-up with infectious disease and neurosurgery pharmacy to dose antibiotics    Anemia on ferrous gluconate hemoglobin 10.0 on 7/3/24    Diabetes type 2 continue glipizide XL 2.5 mg daily, glargine 8 units subcu twice daily, semaglutide A1C 6/18/24 was 8.0      Electronically signed by: Darling Valdivia CNP       Sincerely,        Darling Valdivia CNP

## 2024-07-02 NOTE — TELEPHONE ENCOUNTER
"[facility] Lab Note     Provider: ZACH Mckenzie  Facility: [facility]  Facility Type:  TCU    Allergies   Allergen Reactions    Ancef [Cefazolin] Rash    Cephalexin Itching and Rash     Allergic reaction required hospitalization 4/2024    Penicillins Anaphylaxis    Sulfa Antibiotics      \"itchy rash, swelling of face and hands\"       Labs Reviewed by provider: Heme 2, Glu, creat, CRP, Vanco     Verbal Order/Direction given by Provider: Update results to ID and pharmacy.      Provider giving Order:  ZACH Mckenzie    Verbal Order given to: Rashida Brewer RN  " - - -

## 2024-07-03 ENCOUNTER — TELEPHONE (OUTPATIENT)
Dept: GERIATRICS | Facility: CLINIC | Age: 79
End: 2024-07-03

## 2024-07-03 LAB
AST SERPL W P-5'-P-CCNC: 30 U/L (ref 0–45)
BASOPHILS # BLD AUTO: 0.1 10E3/UL (ref 0–0.2)
BASOPHILS NFR BLD AUTO: 1 %
CREAT SERPL-MCNC: 1.58 MG/DL (ref 0.67–1.17)
CRP SERPL-MCNC: 53.2 MG/L
EGFRCR SERPLBLD CKD-EPI 2021: 44 ML/MIN/1.73M2
EOSINOPHIL # BLD AUTO: 0.2 10E3/UL (ref 0–0.7)
EOSINOPHIL NFR BLD AUTO: 2 %
ERYTHROCYTE [DISTWIDTH] IN BLOOD BY AUTOMATED COUNT: 17.6 % (ref 10–15)
HCT VFR BLD AUTO: 33.8 % (ref 40–53)
HGB BLD-MCNC: 10 G/DL (ref 13.3–17.7)
IMM GRANULOCYTES # BLD: 0.1 10E3/UL
IMM GRANULOCYTES NFR BLD: 1 %
LYMPHOCYTES # BLD AUTO: 1.3 10E3/UL (ref 0.8–5.3)
LYMPHOCYTES NFR BLD AUTO: 15 %
MCH RBC QN AUTO: 28 PG (ref 26.5–33)
MCHC RBC AUTO-ENTMCNC: 29.6 G/DL (ref 31.5–36.5)
MCV RBC AUTO: 95 FL (ref 78–100)
MONOCYTES # BLD AUTO: 0.8 10E3/UL (ref 0–1.3)
MONOCYTES NFR BLD AUTO: 9 %
NEUTROPHILS # BLD AUTO: 6.1 10E3/UL (ref 1.6–8.3)
NEUTROPHILS NFR BLD AUTO: 72 %
NRBC # BLD AUTO: 0 10E3/UL
NRBC BLD AUTO-RTO: 0 /100
PLATELET # BLD AUTO: 458 10E3/UL (ref 150–450)
RBC # BLD AUTO: 3.57 10E6/UL (ref 4.4–5.9)
VANCOMYCIN SERPL-MCNC: 28 UG/ML
WBC # BLD AUTO: 8.5 10E3/UL (ref 4–11)

## 2024-07-03 PROCEDURE — 36415 COLL VENOUS BLD VENIPUNCTURE: CPT | Mod: ORL | Performed by: FAMILY MEDICINE

## 2024-07-03 PROCEDURE — P9604 ONE-WAY ALLOW PRORATED TRIP: HCPCS | Mod: ORL | Performed by: FAMILY MEDICINE

## 2024-07-03 PROCEDURE — 85025 COMPLETE CBC W/AUTO DIFF WBC: CPT | Mod: ORL | Performed by: FAMILY MEDICINE

## 2024-07-03 PROCEDURE — 80202 ASSAY OF VANCOMYCIN: CPT | Mod: ORL | Performed by: FAMILY MEDICINE

## 2024-07-03 PROCEDURE — 84450 TRANSFERASE (AST) (SGOT): CPT | Mod: ORL | Performed by: FAMILY MEDICINE

## 2024-07-03 PROCEDURE — 86140 C-REACTIVE PROTEIN: CPT | Mod: ORL | Performed by: FAMILY MEDICINE

## 2024-07-03 PROCEDURE — 82565 ASSAY OF CREATININE: CPT | Mod: ORL | Performed by: FAMILY MEDICINE

## 2024-07-03 RX ORDER — LIDOCAINE 4 G/G
1 PATCH TOPICAL EVERY 24 HOURS
COMMUNITY
End: 2024-07-08

## 2024-07-03 NOTE — TELEPHONE ENCOUNTER
"[facility] Triage Nurse Telephone Encounter    Provider: ZACH Mckenzie  Facility: [facility]  Facility Type:  TCU    Caller: Wesley  Call Back Number: [phone]    Allergies:    Allergies   Allergen Reactions    Ancef [Cefazolin] Rash    Cephalexin Itching and Rash     Allergic reaction required hospitalization 4/2024    Penicillins Anaphylaxis    Sulfa Antibiotics      \"itchy rash, swelling of face and hands\"        Reason for call: Pt requesting order for Lidocaine patch to lower back for pain.    Verbal Order/Direction given by Provider:   - Lidocaine patch 4% Apply to low back on in the morning off at HS    Provider giving Order:  ZACH Mckenzie    Verbal Order given to: Wesley Hatch RN      "

## 2024-07-04 DIAGNOSIS — M54.41 ACUTE RIGHT-SIDED LOW BACK PAIN WITH RIGHT-SIDED SCIATICA: ICD-10-CM

## 2024-07-04 RX ORDER — OXYCODONE HYDROCHLORIDE 5 MG/1
2.5-5 TABLET ORAL EVERY 6 HOURS PRN
Qty: 10 TABLET | Refills: 0 | Status: SHIPPED | OUTPATIENT
Start: 2024-07-04 | End: 2024-08-04

## 2024-07-04 NOTE — PROGRESS NOTES
[facility] GERIATRIC SERVICES  Chief Complaint   Patient presents with    RECHECK     [facility] Record Number:  [id number]  Place of Service where encounter took place:  [facility] ([facility]) [[id number]]  Code Status:  unknown     HISTORY:      HPI:  Lance Ibrahim  is 79 year old (1945) undergoing physical and occupational therapy.  He is with history of heart failure with preserved ejection fraction, hypertension, dyslipidemia, CKD, gout, BPH, type 2 diabetes and chronic low back pain for which he is folllowed by [facility] and has an implantable spine stimulator ([employer], MRI conditional 4/2024) in place.  Excerpted from records He initially presented to [facility] with acute on chronic, intractable pain with RLE radiculopathy.  For CT concerning for discitis-osteomyelitis at L4-5 with epidural/intraforaminal phlegmon vs abscess at L4-S1 with severe foraminal narrowing. Transferred to [facility] for Neurosurgery evaluation and ID consultation.      Hospitalization on 5/16-5/22/2024 due to severe sepsis related to UTI caused by Klebsiella oxytoca.     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  Acute on chronic back pain with RLE radiculopathy.  Severe spinal stenosis.  --Presented with acute on chronic back pain located on the right side of his back.  Significant tenderness to palpation at approximately the L3-L4 level on the right side with associated RLE radiculopathy. No bladder or bowel incontinence, no saddle anesthesia. Uses a walker for shorter distances and wheelchair for longer distances at baseline. No recent trauma or falls, but did get a trigger point injection by [facility] on 6/14.   *Afebrile since admission, hemodynamically stable.  WBC WNL. CRP 12.4.   *CT lumbar spine as above (review formal report for complete details)   *MRI lumbar spine Discitis osteomyelitis at L4-5. Epidural abscess in  the ventral epidural space, craniocaudal extension of 5 cm (from inferior L4 through inferior S1 level). At L4-5, severe spinal stenosis secondary to the epidural abscess. At L5-S1, moderate spinal stenosis secondary to the epidural abscess.  --Status post IR guided L4-L5 disc aspiration and L4 biopsy. 1 ml of rust colored cloudy fluid aspirated from L4-5 disc space. Large core specimen also obtained from L4 interior endplate  --Started on IV vancomycin on 6/19 post IR guided aspiration.    Blood cultures no growth to date.  Intraoperative biopsy, aspirate - no growth to date.  Noted allergies to penicillin, cephalosporins and sulfa.   --Infectious disease followed - Anticipate at least 6 weeks of IV antibiotics.   --Neurosurgery signed off, no surgical intervention.  Continue intravenous vancomycin (6/19).  PICC line placed 6/23.    Added ertapenem [6/24] for gram-negative coverage given recent Klebsiella bacteremia.  Vancomycin until 7/31, Ertapenem until 8/5    plan to repeat MRI in 6 weeks.     Bacteriuria with cultures negative  Recent severe sepsis in the setting of Klebsiella UTI:   Hospitalization on 5/16/2024-5/22/2024 due to severe sepsis related to UTI caused by Klebsiella oxytoca. Patient was on IV Vanco, Levaquin, Cefepime, and Ceftriaxone in the hospital. He was discharged on oral Cefdinir and Levaquin. No current sx of UTI.   -UA with trace blood in urine, few bacteria otherwise unremarkable.   Urine cultures with less than 10,000 mixed fred.  CT A/P on admission negative for renal calculi or hydronephrosis, note left renal cyst.   --On ertapenem as above.    Today he is seen to review VS, Labs, routine visit   He denied chest pain shortness of breath cough congestion constipation or diarrhea pain controlled with current medications.  He is with a spinal cord stimulator right lower back in which she reports he charges weekly.  He will continue on IV antibiotics until 8/6/2024.   Pharmacy to dose  vancomycin.  labs reviewed hemoglobin 10.0. A1c 2/20/2024 8.0 he will continue on weekly labs and daily weights for CHF however he denies this diagnosis.  He is with left knee weakness and difficulty ambulating. His left knee surgery was postponed until infection is clear.  Oxygen decreased then into the upper 80s he was placed on 2 L nasal cannula lung sounds coarse on the left he will have a chest x-ray      ALLERGIES:Ancef [cefazolin], Cephalexin, Penicillins, and Sulfa antibiotics    PAST MEDICAL HISTORY:   Past Medical History:   Diagnosis Date    Anemia     Arthritis     osteoarthritis knees    BPH     CAD (coronary artery disease)     subtotal occlusion in the small distal LAD     Cardiomyopathy     Cerebral artery occlusion with cerebral infarction     TIA 1993, no residual    Cervicalgia     CHF (congestive heart failure) (H)     Chronic kidney disease     Chronic low back pain     Chronic rhinitis     Gouty arthropathy     hands    Hyperlipidemia     Hypertension     Kidney stone     Nocturia     Obesity     Osteomyelitis (H) 08/2023    Foot    PVD (peripheral vascular disease)     Sleep apnea     doesn't use cpap    TIA (transient ischaemic attack) 1993    Type 2 diabetes mellitus - 11/08/2018    Ureteral stone        PAST SURGICAL HISTORY:   has a past surgical history that includes Diastasis recti repair (1985); Ventral hernia repair NOS (1987); ORIF Shoulder (Left); Colonoscopy (03/09/2013); hernia repair (07/13/2004); Foot surgery (04/2013); Amputate toe(s) (Left, 10/15/2018); Arthroplasty knee (Right, 12/07/2018); Heart Catheterization with Possible Intervention (Left, 03/05/2019); Extracapsular cataract extration with intraocular lens implant (Left, 03/13/2017); Extracapsular cataract extration with intraocular lens implant (Right); Amputate foot (Right, 08/07/2023); Esophagoscopy, gastroscopy, duodenoscopy (EGD), combined (N/A, 04/30/2024); Endoscopic retrograde cholangiopancreatogram (N/A,  04/30/2024); Spinal Cord Stimulator Implant (04/03/2024); Prostate surgery (02/2024); and IR Disc Aspriation Injection (6/19/2024).    FAMILY HISTORY: family history includes Alcohol/Drug in his paternal grandfather; Cancer in his father; Diabetes in his maternal grandfather; Family History Negative in his mother.    SOCIAL HISTORY:  reports that he quit smoking about 31 years ago. His smoking use included cigarettes. He has never used smokeless tobacco. He reports that he does not currently use alcohol. He reports that he does not use drugs.    ROS:  Constitutional: Negative for activity change, appetite change, fatigue and fever.   HENT: Negative for congestion.    Respiratory: Negative for cough, shortness of breath and wheezing.    Cardiovascular: Negative for chest pain and leg swelling.   Gastrointestinal: Negative for abdominal distention, abdominal pain, constipation, diarrhea and nausea.   Genitourinary: Negative for dysuria.   Musculoskeletal: positive  for arthralgia. Positive  for back pain.   Skin: Negative for color change and wound. PICC LINE    Neurological: Negative for dizziness.   Psychiatric/Behavioral: Negative for agitation, behavioral problems and confusion.     Physical Exam:  Constitutional:       Appearance: Patient is well-developed.   HENT:      Head: Normocephalic.   Eyes:      Conjunctiva/sclera: Conjunctivae normal.   Neck:      Musculoskeletal: Normal range of motion.   Cardiovascular:      Rate and Rhythm: Normal rate and regular rhythm.      Heart sounds: Normal heart sounds. No murmur.   Pulmonary:      Effort: No respiratory distress.      Breath sounds: Normal breath sounds.  Lung sounds coarse on the left no wheezing or rales.   Abdominal:      General: Bowel sounds are normal. There is no distension.      Palpations: Abdomen is soft.      Tenderness: There is no abdominal tenderness.   Musculoskeletal:       Normal range of motion.  decreased ROM LUE and LLE   Skin:General:         Skin is warm.   Neurological:         Mental Status: Patient is alert and oriented to person, place, and time.   Psychiatric:         Behavior: Behavior normal.     Vitals:BP (!) 153/73   Pulse 101   Temp 97.9  F (36.6  C)   Resp 18   Ht 1.829 m (6')   Wt 88 kg (194 lb)   BMI 26.31 kg/m   and Body mass index is 26.31 kg/m .    Lab/Diagnostic data:   Recent Results (from the past 240 hour(s))   Glucose by meter    Collection Time: 06/24/24  5:42 PM   Result Value Ref Range    GLUCOSE BY METER POCT 123 (H) 70 - 99 mg/dL   Glucose by meter    Collection Time: 06/24/24  9:36 PM   Result Value Ref Range    GLUCOSE BY METER POCT 187 (H) 70 - 99 mg/dL   Glucose by meter    Collection Time: 06/25/24  2:00 AM   Result Value Ref Range    GLUCOSE BY METER POCT 135 (H) 70 - 99 mg/dL   Glucose by meter    Collection Time: 06/25/24  5:59 AM   Result Value Ref Range    GLUCOSE BY METER POCT 132 (H) 70 - 99 mg/dL   Glucose by meter    Collection Time: 06/25/24  7:43 AM   Result Value Ref Range    GLUCOSE BY METER POCT 126 (H) 70 - 99 mg/dL   Glucose by meter    Collection Time: 06/25/24 11:39 AM   Result Value Ref Range    GLUCOSE BY METER POCT 229 (H) 70 - 99 mg/dL   CRP inflammation    Collection Time: 06/27/24 11:06 AM   Result Value Ref Range    CRP Inflammation 52.20 (H) <5.00 mg/L   Creatinine    Collection Time: 06/27/24 11:06 AM   Result Value Ref Range    Creatinine 1.37 (H) 0.67 - 1.17 mg/dL    GFR Estimate 52 (L) >60 mL/min/1.73m2   AST    Collection Time: 06/27/24 11:06 AM   Result Value Ref Range    AST 30 0 - 45 U/L   Vancomycin level    Collection Time: 06/27/24 11:06 AM   Result Value Ref Range    Vancomycin 29.7 (HH)   ug/mL   CBC with platelets and differential    Collection Time: 06/27/24 11:06 AM   Result Value Ref Range    WBC Count 6.8 4.0 - 11.0 10e3/uL    RBC Count 3.35 (L) 4.40 - 5.90 10e6/uL    Hemoglobin 9.4 (L) 13.3 - 17.7 g/dL    Hematocrit 31.4 (L) 40.0 - 53.0 %    MCV 94 78 - 100 fL    MCH  28.1 26.5 - 33.0 pg    MCHC 29.9 (L) 31.5 - 36.5 g/dL    RDW 16.9 (H) 10.0 - 15.0 %    Platelet Count 341 150 - 450 10e3/uL    % Neutrophils 66 %    % Lymphocytes 17 %    % Monocytes 10 %    % Eosinophils 3 %    % Basophils 1 %    % Immature Granulocytes 3 %    NRBCs per 100 WBC 0 <1 /100    Absolute Neutrophils 4.6 1.6 - 8.3 10e3/uL    Absolute Lymphocytes 1.2 0.8 - 5.3 10e3/uL    Absolute Monocytes 0.7 0.0 - 1.3 10e3/uL    Absolute Eosinophils 0.2 0.0 - 0.7 10e3/uL    Absolute Basophils 0.1 0.0 - 0.2 10e3/uL    Absolute Immature Granulocytes 0.2 <=0.4 10e3/uL    Absolute NRBCs 0.0 10e3/uL   Creatinine    Collection Time: 07/02/24  6:26 AM   Result Value Ref Range    Creatinine 1.59 (H) 0.67 - 1.17 mg/dL    GFR Estimate 44 (L) >60 mL/min/1.73m2   Vancomycin level    Collection Time: 07/02/24  6:26 AM   Result Value Ref Range    Vancomycin 25.9 (HH)   ug/mL   CBC with platelets    Collection Time: 07/02/24  6:26 AM   Result Value Ref Range    WBC Count 8.0 4.0 - 11.0 10e3/uL    RBC Count 3.34 (L) 4.40 - 5.90 10e6/uL    Hemoglobin 9.4 (L) 13.3 - 17.7 g/dL    Hematocrit 31.4 (L) 40.0 - 53.0 %    MCV 94 78 - 100 fL    MCH 28.1 26.5 - 33.0 pg    MCHC 29.9 (L) 31.5 - 36.5 g/dL    RDW 17.3 (H) 10.0 - 15.0 %    Platelet Count 393 150 - 450 10e3/uL   Glucose (OUTREACH)    Collection Time: 07/02/24  6:26 AM   Result Value Ref Range    Glucose 138 (H) 70 - 99 mg/dL   CRP inflammation    Collection Time: 07/02/24  6:26 AM   Result Value Ref Range    CRP Inflammation 74.30 (H) <5.00 mg/L   CRP inflammation    Collection Time: 07/03/24  8:40 AM   Result Value Ref Range    CRP Inflammation 53.20 (H) <5.00 mg/L   Creatinine    Collection Time: 07/03/24  8:40 AM   Result Value Ref Range    Creatinine 1.58 (H) 0.67 - 1.17 mg/dL    GFR Estimate 44 (L) >60 mL/min/1.73m2   AST    Collection Time: 07/03/24  8:40 AM   Result Value Ref Range    AST 30 0 - 45 U/L   Vancomycin level    Collection Time: 07/03/24  8:40 AM   Result Value Ref  Range    Vancomycin 28.0 (HH)   ug/mL   CBC with platelets and differential    Collection Time: 07/03/24  8:40 AM   Result Value Ref Range    WBC Count 8.5 4.0 - 11.0 10e3/uL    RBC Count 3.57 (L) 4.40 - 5.90 10e6/uL    Hemoglobin 10.0 (L) 13.3 - 17.7 g/dL    Hematocrit 33.8 (L) 40.0 - 53.0 %    MCV 95 78 - 100 fL    MCH 28.0 26.5 - 33.0 pg    MCHC 29.6 (L) 31.5 - 36.5 g/dL    RDW 17.6 (H) 10.0 - 15.0 %    Platelet Count 458 (H) 150 - 450 10e3/uL    % Neutrophils 72 %    % Lymphocytes 15 %    % Monocytes 9 %    % Eosinophils 2 %    % Basophils 1 %    % Immature Granulocytes 1 %    NRBCs per 100 WBC 0 <1 /100    Absolute Neutrophils 6.1 1.6 - 8.3 10e3/uL    Absolute Lymphocytes 1.3 0.8 - 5.3 10e3/uL    Absolute Monocytes 0.8 0.0 - 1.3 10e3/uL    Absolute Eosinophils 0.2 0.0 - 0.7 10e3/uL    Absolute Basophils 0.1 0.0 - 0.2 10e3/uL    Absolute Immature Granulocytes 0.1 <=0.4 10e3/uL    Absolute NRBCs 0.0 10e3/uL        MEDICATIONS:  MED REC REQUIRED  Post Medication Reconciliation Status: discharge medications reconciled, continue medications without change          Review of your medicines            Accurate as of July 2, 2024 11:59 PM. If you have any questions, ask your nurse or doctor.                CONTINUE these medicines which may have CHANGED, or have new prescriptions. If we are uncertain of the size of tablets/capsules you have at home, strength may be listed as something that might have changed.        Dose / Directions   emollient external cream  This may have changed:   when to take this  reasons to take this  Used for: Rash      Apply topically 3 times daily  Quantity: 113 g  Refills: 3     ferrous gluconate 324 (38 Fe) MG tablet  Commonly known as: FERGON  This may have changed: Another medication with the same name was removed. Continue taking this medication, and follow the directions you see here.  Used for: Iron deficiency anemia, unspecified iron deficiency anemia type  Changed by: Darling Valdivia       Dose: 324 mg  Take 1 tablet (324 mg) by mouth daily  Refills: 0            CONTINUE these medicines which have NOT CHANGED        Dose / Directions   acetaminophen 325 MG tablet  Commonly known as: TYLENOL  Used for: Acute right-sided low back pain with right-sided sciatica      Dose: 650 mg  Take 2 tablets (650 mg) by mouth every 6 hours as needed for pain (and adjunct with moderate or severe pain or per patient request)  Refills: 0     aspirin 81 MG EC tablet  Used for: Coronary artery disease involving native coronary artery of native heart without angina pectoris      Dose: 81 mg  Take 1 tablet (81 mg) by mouth daily  Refills: 0     colchicine 0.6 MG tablet  Commonly known as: COLCRYS  Used for: Gouty arthropathy      TAKE 1 TABLET DAILY  Quantity: 90 tablet  Refills: 3     diclofenac 1 % topical gel  Commonly known as: VOLTAREN      Dose: 4 g  Apply 4 g topically 4 times daily as needed for moderate pain Left knee  Refills: 0     ertapenem 1 GM vial  Commonly known as: INVanz  Indication: Infection of Bone and Bone Marrow  Used for: Lumbar discitis      Dose: 1 g  Inject 1 g into the vein every 24 hours for 42 doses  Refills: 0     famotidine 20 MG tablet  Commonly known as: PEPCID      Dose: 20 mg  Take 1 tablet (20 mg) by mouth 2 times daily as needed (itching)  Quantity: 30 tablet  Refills: 0     fluticasone 50 MCG/ACT nasal spray  Commonly known as: FLONASE      Dose: 1-2 spray  Spray 1-2 sprays in nostril daily as needed  Refills: 0     gabapentin 100 MG capsule  Commonly known as: NEURONTIN  Used for: Acute right-sided low back pain with right-sided sciatica      Dose: 100 mg  Take 1 capsule (100 mg) by mouth 3 times daily  Refills: 0     glipiZIDE 2.5 MG 24 hr tablet  Commonly known as: GLUCOTROL XL      Dose: 2.5 mg  Take 2.5 mg by mouth daily  Refills: 0     HEMP OIL OR EXTRACT OR OTHER CBD CANNABINOID (NOT MEDICAL CANNABIS)      Dose: 1 chew tab  Take 1 chew tab by mouth at bedtime CBD  gummy  Refills: 0     insulin glargine 100 UNIT/ML pen  Commonly known as: LANTUS PEN  Used for: 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  Refills: 0     lactobacillus rhamnosus (GG) capsule      Dose: 2 capsule  Take 2 capsules by mouth daily Noon  Refills: 0     * Lidocaine 4 % Patch  Commonly known as: LIDOCARE      Dose: 1 patch  Place 1 patch onto the skin daily as needed To prevent lidocaine toxicity, patient should be patch free for 12 hrs daily.  Refills: 0     * Lidocaine 4 % Patch  Commonly known as: LIDOCARE      Dose: 1 patch  Place 1 patch onto the skin every 24 hours To prevent lidocaine toxicity, patient should be patch free for 12 hrs daily.  Refills: 0     lisinopril 10 MG tablet  Commonly known as: ZESTRIL  Used for: Chronic diastolic heart failure (H)      Dose: 10 mg  Take 1 tablet (10 mg) by mouth every 24 hours  Quantity: 90 tablet  Refills: 3     melatonin 5 MG Caps      Dose: 5 mg  Take 5 mg by mouth at bedtime  Refills: 0     methocarbamol 500 MG tablet  Commonly known as: ROBAXIN  Used for: Acute right-sided low back pain with right-sided sciatica      Dose: 500 mg  Take 1 tablet (500 mg) by mouth 3 times daily as needed for muscle spasms  Quantity: 10 tablet  Refills: 0     nortriptyline 10 MG capsule  Commonly known as: PAMELOR      Dose: 20 mg  Take 20 mg by mouth nightly as needed  Refills: 0     oxyCODONE 5 MG tablet  Commonly known as: ROXICODONE  Used for: Acute right-sided low back pain with right-sided sciatica      Dose: 2.5-5 mg  Take 0.5-1 tablets (2.5-5 mg) by mouth every 6 hours as needed for moderate to severe pain (2.5 mg for moderate pain, 5 mg for severe pain.)  Quantity: 12 tablet  Refills: 0      polyethylene glycol 17 GM/Dose powder  Commonly known as: MIRALAX  Used for: Chronic low back pain without sciatica, unspecified back pain laterality      Dose: 17 g  Take 17 g by mouth daily as needed for constipation  Quantity: 225 g  Refills: 0     Semaglutide 14 MG tablet  Commonly known as: RYBELSUS  Used for: Type 2 diabetes, HbA1c goal < 7% (H)      Dose: 14 mg  Take 14 mg by mouth daily  Quantity: 30 tablet  Refills: 3     senna-docusate 8.6-50 MG tablet  Commonly known as: SENOKOT-S/PERICOLACE  Used for: Constipation, unspecified constipation type      Dose: 1 tablet  Take 1 tablet by mouth 2 times daily as needed for constipation  Refills: 0     sodium bicarbonate 650 MG tablet  Used for: Acidosis      Dose: 1,950 mg  Take 3 tablets (1,950 mg) by mouth 3 times daily  Quantity: 180 tablet  Refills: 1     torsemide 20 MG tablet  Commonly known as: DEMADEX  Used for: Essential hypertension      Dose: 20 mg  TAKE 1 TABLET DAILY  Quantity: 90 tablet  Refills: 3     Tums 500 MG chewable tablet  Generic drug: calcium carbonate      Dose: 1,000 mg  Take 1,000 mg by mouth 3 times daily as needed for heartburn  Refills: 0     ursodiol 300 MG capsule  Commonly known as: ACTIGALL  Used for: Biliary stricture (H28)      Dose: 300 mg  Take 1 capsule (300 mg) by mouth 2 times daily  Quantity: 60 capsule  Refills: 0     vancomycin 1500 mg/250 mL IVPB  Commonly known as: VANCOCIN  Indication: Infection of Bone and Bone Marrow  Used for: Lumbar discitis      Dose: 1,500 mg  Inject 1,500 mg into the vein every 24 hours for 41 days Please check every Monday CBC with diff, creatinine, vanco level, AST, and CRP. Every Thursday check creatinine and vanco level. Pharmacist to assist with dosing vanco with AUC. Fax results to [facility].  Refills: 0           * This list has 2 medication(s) that are the same as other medications prescribed for you. Read the directions carefully, and ask your doctor or other care  provider to review them with you.                  ASSESSMENT/PLAN  Encounter Diagnoses   Name Primary?    Physical deconditioning Yes    Pain management     Chronic systolic congestive heart failure (H)     Discitis, unspecified spinal region        Pain management as needed Tylenol, as needed oxycodone, diclofenac gel, lidocaine patch, methocarbamol,  spinal cord stimulator    Mood disorder on nortriptyline     Physical deconditioning  PT/OT    Gout on colchicine    Bone infection on ertapenem until 8/6/2024, and vancomycin until 8/4/2024 with follow-up with infectious disease and neurosurgery pharmacy to dose antibiotics    Anemia on ferrous gluconate hemoglobin 10.0 on 7/3/24    Diabetes type 2 continue glipizide XL 2.5 mg daily, glargine 8 units subcu twice daily, semaglutide A1C 6/18/24 was 8.0      Electronically signed by: Darling Valdivia CNP

## 2024-07-08 ENCOUNTER — LAB REQUISITION (OUTPATIENT)
Dept: LAB | Facility: CLINIC | Age: 79
End: 2024-07-08
Payer: COMMERCIAL

## 2024-07-08 ENCOUNTER — TRANSITIONAL CARE UNIT VISIT (OUTPATIENT)
Dept: GERIATRICS | Facility: CLINIC | Age: 79
End: 2024-07-08
Payer: COMMERCIAL

## 2024-07-08 VITALS
HEART RATE: 85 BPM | DIASTOLIC BLOOD PRESSURE: 65 MMHG | WEIGHT: 192 LBS | TEMPERATURE: 97 F | SYSTOLIC BLOOD PRESSURE: 128 MMHG | OXYGEN SATURATION: 92 % | RESPIRATION RATE: 18 BRPM | HEIGHT: 72 IN | BODY MASS INDEX: 26.01 KG/M2

## 2024-07-08 DIAGNOSIS — A41.9 SEVERE SEPSIS (H): ICD-10-CM

## 2024-07-08 DIAGNOSIS — Z51.81 ENCOUNTER FOR THERAPEUTIC DRUG LEVEL MONITORING: ICD-10-CM

## 2024-07-08 DIAGNOSIS — R65.20 SEVERE SEPSIS (H): ICD-10-CM

## 2024-07-08 DIAGNOSIS — R53.81 PHYSICAL DECONDITIONING: Primary | ICD-10-CM

## 2024-07-08 DIAGNOSIS — R52 PAIN MANAGEMENT: ICD-10-CM

## 2024-07-08 PROCEDURE — 99309 SBSQ NF CARE MODERATE MDM 30: CPT | Performed by: NURSE PRACTITIONER

## 2024-07-08 RX ORDER — SODIUM CHLORIDE 9 MG/ML
10 INJECTION, SOLUTION INTRAVENOUS CONTINUOUS
COMMUNITY
End: 2024-08-22

## 2024-07-08 NOTE — LETTER
7/8/2024      Lance Ibrahim  [address]  [facility] [id number]        [facility]  Chief Complaint   Patient presents with     MARK ANTHONYECK     [place] Medical Record Number:  [id number]  Place of Service where encounter took place:  [facility] ([facility]) [[id number]]  Code Status:  unknown     HISTORY:      HPI:  Lance Ibrahim  is 79 year old (1945) undergoing physical and occupational therapy.  He is with history of heart failure with preserved ejection fraction, hypertension, dyslipidemia, CKD, gout, BPH, type 2 diabetes and chronic low back pain for which he is folllowed by [facility] and has an implantable spine stimulator ([employer], MRI conditional 4/2024) in place.  Excerpted from records He initially presented to [facility] with acute on chronic, intractable pain with RLE radiculopathy.  For CT concerning for discitis-osteomyelitis at L4-5 with epidural/intraforaminal phlegmon vs abscess at L4-S1 with severe foraminal narrowing. Transferred to [facility] for Neurosurgery evaluation and ID consultation.      Hospitalization on 5/16-5/22/2024 due to severe sepsis related to UTI caused by Klebsiella oxytoca.     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  Acute on chronic back pain with RLE radiculopathy.  Severe spinal stenosis.  --Presented with acute on chronic back pain located on the right side of his back.  Significant tenderness to palpation at approximately the L3-L4 level on the right side with associated RLE radiculopathy. No bladder or bowel incontinence, no saddle anesthesia. Uses a walker for shorter distances and wheelchair for longer distances at baseline. No recent trauma or falls, but did get a trigger point injection by [facility] clinic on 6/14.   *Afebrile since admission, hemodynamically stable.  WBC WNL. CRP 12.4.   *CT lumbar spine as above (review formal report for complete  details)   *MRI lumbar spine Discitis osteomyelitis at L4-5. 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 At L4-5, severe spinal stenosis secondary to the epidural abscess. At L5-S1, moderate spinal stenosis secondary to the epidural abscess.  --Status post IR guided L4-L5 disc aspiration and L4 biopsy. 1 ml of rust colored cloudy fluid aspirated from L4-5 disc space. Large core specimen also obtained from L4 interior endplate  --Started on IV vancomycin on 6/19 post IR guided aspiration.    Blood cultures no growth to date.  Intraoperative biopsy, aspirate - no growth to date.  Noted allergies to penicillin, cephalosporins and sulfa.   --Infectious disease followed - Anticipate at least 6 weeks of IV antibiotics.   --Neurosurgery signed off, no surgical intervention.  Continue intravenous vancomycin (6/19).  PICC line placed 6/23.    Added ertapenem [6/24] for gram-negative coverage given recent Klebsiella bacteremia.  Vancomycin until 7/31, Ertapenem until 8/5    plan to repeat MRI in 6 weeks.     Bacteriuria with cultures negative  Recent severe sepsis in the setting of Klebsiella UTI:   Hospitalization on 5/16/2024-5/22/2024 due to severe sepsis related to UTI caused by Klebsiella oxytoca. Patient was on IV Vanco, Levaquin, Cefepime, and Ceftriaxone in the hospital. He was discharged on oral Cefdinir and Levaquin. No current sx of UTI.   -UA with trace blood in urine, few bacteria otherwise unremarkable.   Urine cultures with less than 10,000 mixed fred.  CT A/P on admission negative for renal calculi or hydronephrosis, note left renal cyst.   --On ertapenem as above.    Today he is seen to review VS, Labs, routine visit and for reports of hallucinations.  Patient tells writer that this was the first time he has had hallucinations.  He reported that a woman was in his room handing him a cup of tea and he reached out for it and  realized his hand was empty.  He was alert and oriented x 4.  Currently on oxygen 92% on 2l NC. chest x-ray done which showed no acute changes.  It was also reported that he had a stage II on his right heel however when reviewed that heel was scabbed over making it unstageable and orders placed for Skin-Prep twice daily and to float heels in bed.  Per his report he was supposed to have a uvulectomy done back when he was 9 years old.  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  He sleeps with 2-3 pillows.  He denied chest pain shortness of breath cough congestion constipation or diarrhea pain ,His pain is controlled with current medications.  He is with a spinal cord stimulator right lower back in which she reports he charges weekly.  He will continue on IV antibiotics.   A1c 2/20/2024 8.0 he will continue on weekly labs and daily weights for CHF however he denies this diagnosis.  He is with left knee weakness and difficulty ambulating. His left knee surgery was postponed until infection is clear.  CBC checked and white blood cell count within normal limits and hemoglobin 9.2. His  IV Vancomycin was discontinued and he continues on Ertapenum.      ALLERGIES:Ancef [cefazolin], Cephalexin, Penicillins, and Sulfa antibiotics    PAST MEDICAL HISTORY:   Past Medical History:   Diagnosis Date     Anemia      Arthritis     osteoarthritis knees     BPH      CAD (coronary artery disease)     subtotal occlusion in the small distal LAD      Cardiomyopathy      Cerebral artery occlusion with cerebral infarction     TIA 1993, no residual     Cervicalgia      CHF (congestive heart failure) (H)      Chronic kidney disease      Chronic low back pain      Chronic rhinitis      Gouty arthropathy     hands     Hyperlipidemia      Hypertension      Kidney stone      Nocturia      Obesity      Osteomyelitis (H) 08/2023    Foot     PVD (peripheral vascular disease)      Sleep apnea     doesn't use  cpap     TIA (transient ischaemic attack) 1993     Type 2 diabetes mellitus - 11/08/2018     Ureteral stone        PAST SURGICAL HISTORY:   has a past surgical history that includes Diastasis recti repair (1985); Ventral hernia repair NOS (1987); ORIF Shoulder (Left); Colonoscopy (03/09/2013); hernia repair (07/13/2004); Foot surgery (04/2013); Amputate toe(s) (Left, 10/15/2018); Arthroplasty knee (Right, 12/07/2018); Heart Catheterization with Possible Intervention (Left, 03/05/2019); Extracapsular cataract extration with intraocular lens implant (Left, 03/13/2017); Extracapsular cataract extration with intraocular lens implant (Right); Amputate foot (Right, 08/07/2023); Esophagoscopy, gastroscopy, duodenoscopy (EGD), combined (N/A, 04/30/2024); Endoscopic retrograde cholangiopancreatogram (N/A, 04/30/2024); Spinal Cord Stimulator Implant (04/03/2024); Prostate surgery (02/2024); and IR Disc Aspriation Injection (6/19/2024).    FAMILY HISTORY: family history includes Alcohol/Drug in his paternal grandfather; Cancer in his father; Diabetes in his maternal grandfather; Family History Negative in his mother.    SOCIAL HISTORY:  reports that he quit smoking about 31 years ago. His smoking use included cigarettes. He has never used smokeless tobacco. He reports that he does not currently use alcohol. He reports that he does not use drugs.    ROS:  Constitutional: Negative for activity change, appetite change, fatigue and fever.   HENT: Negative for congestion.    Respiratory: Negative for cough, shortness of breath and wheezing.    Cardiovascular: Negative for chest pain and leg swelling.   Gastrointestinal: Negative for abdominal distention, abdominal pain, constipation, diarrhea and nausea.   Genitourinary: Negative for dysuria.   Musculoskeletal: positive  for arthralgia. Positive  for back pain.   Skin: Negative for color change and wound. PICC LINE    Neurological: Negative for dizziness.   Psychiatric/Behavioral:  Negative for agitation, behavioral problems and confusion.     Physical Exam:  Constitutional:       Appearance: Patient is well-developed.   HENT:      Head: Normocephalic.   Eyes:      Conjunctiva/sclera: Conjunctivae normal.   Neck:      Musculoskeletal: Normal range of motion.   Cardiovascular:      Rate and Rhythm: Normal rate and regular rhythm.      Heart sounds: Normal heart sounds. No murmur.   Pulmonary:      Effort: No respiratory distress.      Breath sounds: Normal breath sounds.   no wheezing or rales.   Abdominal:      General: Bowel sounds are normal. There is no distension.      Palpations: Abdomen is soft.      Tenderness: There is no abdominal tenderness.   Musculoskeletal:       Normal range of motion.  decreased ROM LUE and LLE   Skin:General:        Skin is warm.   Neurological:         Mental Status: Patient is alert and oriented to person, place, and time.   Psychiatric:         Behavior: Behavior normal.     Vitals:/65   Pulse 85   Temp 97  F (36.1  C)   Resp 18   Ht 1.829 m (6')   Wt 87.1 kg (192 lb)   SpO2 92%   BMI 26.04 kg/m   and Body mass index is 26.04 kg/m .    Lab/Diagnostic data:   Recent Results (from the past 240 hour(s))   Creatinine    Collection Time: 07/02/24  6:26 AM   Result Value Ref Range    Creatinine 1.59 (H) 0.67 - 1.17 mg/dL    GFR Estimate 44 (L) >60 mL/min/1.73m2   Vancomycin level    Collection Time: 07/02/24  6:26 AM   Result Value Ref Range    Vancomycin 25.9 (HH)   ug/mL   CBC with platelets    Collection Time: 07/02/24  6:26 AM   Result Value Ref Range    WBC Count 8.0 4.0 - 11.0 10e3/uL    RBC Count 3.34 (L) 4.40 - 5.90 10e6/uL    Hemoglobin 9.4 (L) 13.3 - 17.7 g/dL    Hematocrit 31.4 (L) 40.0 - 53.0 %    MCV 94 78 - 100 fL    MCH 28.1 26.5 - 33.0 pg    MCHC 29.9 (L) 31.5 - 36.5 g/dL    RDW 17.3 (H) 10.0 - 15.0 %    Platelet Count 393 150 - 450 10e3/uL   Glucose (OUTREACH)    Collection Time: 07/02/24  6:26 AM   Result Value Ref Range    Glucose  138 (H) 70 - 99 mg/dL   CRP inflammation    Collection Time: 07/02/24  6:26 AM   Result Value Ref Range    CRP Inflammation 74.30 (H) <5.00 mg/L   CRP inflammation    Collection Time: 07/03/24  8:40 AM   Result Value Ref Range    CRP Inflammation 53.20 (H) <5.00 mg/L   Creatinine    Collection Time: 07/03/24  8:40 AM   Result Value Ref Range    Creatinine 1.58 (H) 0.67 - 1.17 mg/dL    GFR Estimate 44 (L) >60 mL/min/1.73m2   AST    Collection Time: 07/03/24  8:40 AM   Result Value Ref Range    AST 30 0 - 45 U/L   Vancomycin level    Collection Time: 07/03/24  8:40 AM   Result Value Ref Range    Vancomycin 28.0 (HH)   ug/mL   CBC with platelets and differential    Collection Time: 07/03/24  8:40 AM   Result Value Ref Range    WBC Count 8.5 4.0 - 11.0 10e3/uL    RBC Count 3.57 (L) 4.40 - 5.90 10e6/uL    Hemoglobin 10.0 (L) 13.3 - 17.7 g/dL    Hematocrit 33.8 (L) 40.0 - 53.0 %    MCV 95 78 - 100 fL    MCH 28.0 26.5 - 33.0 pg    MCHC 29.6 (L) 31.5 - 36.5 g/dL    RDW 17.6 (H) 10.0 - 15.0 %    Platelet Count 458 (H) 150 - 450 10e3/uL    % Neutrophils 72 %    % Lymphocytes 15 %    % Monocytes 9 %    % Eosinophils 2 %    % Basophils 1 %    % Immature Granulocytes 1 %    NRBCs per 100 WBC 0 <1 /100    Absolute Neutrophils 6.1 1.6 - 8.3 10e3/uL    Absolute Lymphocytes 1.3 0.8 - 5.3 10e3/uL    Absolute Monocytes 0.8 0.0 - 1.3 10e3/uL    Absolute Eosinophils 0.2 0.0 - 0.7 10e3/uL    Absolute Basophils 0.1 0.0 - 0.2 10e3/uL    Absolute Immature Granulocytes 0.1 <=0.4 10e3/uL    Absolute NRBCs 0.0 10e3/uL   CBC with platelets and differential    Collection Time: 07/09/24  5:49 AM   Result Value Ref Range    WBC Count 5.4 4.0 - 11.0 10e3/uL    RBC Count 3.28 (L) 4.40 - 5.90 10e6/uL    Hemoglobin 9.2 (L) 13.3 - 17.7 g/dL    Hematocrit 31.7 (L) 40.0 - 53.0 %    MCV 97 78 - 100 fL    MCH 28.0 26.5 - 33.0 pg    MCHC 29.0 (L) 31.5 - 36.5 g/dL    RDW 18.4 (H) 10.0 - 15.0 %    Platelet Count 373 150 - 450 10e3/uL    % Neutrophils 50 %     % Lymphocytes 31 %    % Monocytes 13 %    % Eosinophils 4 %    % Basophils 1 %    % Immature Granulocytes 1 %    NRBCs per 100 WBC 0 <1 /100    Absolute Neutrophils 2.7 1.6 - 8.3 10e3/uL    Absolute Lymphocytes 1.7 0.8 - 5.3 10e3/uL    Absolute Monocytes 0.7 0.0 - 1.3 10e3/uL    Absolute Eosinophils 0.2 0.0 - 0.7 10e3/uL    Absolute Basophils 0.1 0.0 - 0.2 10e3/uL    Absolute Immature Granulocytes 0.0 <=0.4 10e3/uL    Absolute NRBCs 0.0 10e3/uL        MEDICATIONS:  MED REC REQUIRED  Post Medication Reconciliation Status: discharge medications reconciled, continue medications without change          Review of your medicines            Accurate as of July 8, 2024 11:59 PM. If you have any questions, ask your nurse or doctor.                CONTINUE these medicines which may have CHANGED, or have new prescriptions. If we are uncertain of the size of tablets/capsules you have at home, strength may be listed as something that might have changed.        Dose / Directions   emollient external cream  This may have changed:   when to take this  reasons to take this  Used for: Rash      Apply topically 3 times daily  Quantity: 113 g  Refills: 3     Lidocaine 4 % Patch  Commonly known as: LIDOCARE  This may have changed: Another medication with the same name was removed. Continue taking this medication, and follow the directions you see here.  Changed by: Darling Valdivia      Dose: 1 patch  Place 1 patch onto the skin at bedtime To prevent lidocaine toxicity, patient should be patch free for 12 hrs daily.  Refills: 0            CONTINUE these medicines which have NOT CHANGED        Dose / Directions   acetaminophen 325 MG tablet  Commonly known as: TYLENOL  Used for: Acute right-sided low back pain with right-sided sciatica      Dose: 650 mg  Take 2 tablets (650 mg) by mouth every 6 hours as needed for pain (and adjunct with moderate or severe pain or per patient request)  Refills: 0     aspirin 81 MG EC tablet  Used for:  Coronary artery disease involving native coronary artery of native heart without angina pectoris      Dose: 81 mg  Take 1 tablet (81 mg) by mouth daily  Refills: 0     colchicine 0.6 MG tablet  Commonly known as: COLCRYS  Used for: Gouty arthropathy      TAKE 1 TABLET DAILY  Quantity: 90 tablet  Refills: 3     diclofenac 1 % topical gel  Commonly known as: VOLTAREN      Dose: 4 g  Apply 4 g topically 4 times daily as needed for moderate pain Left knee  Refills: 0     ertapenem 1 GM vial  Commonly known as: INVanz  Indication: Infection of Bone and Bone Marrow  Used for: Lumbar discitis      Dose: 1 g  Inject 1 g into the vein every 24 hours for 42 doses  Refills: 0     famotidine 20 MG tablet  Commonly known as: PEPCID      Dose: 20 mg  Take 1 tablet (20 mg) by mouth 2 times daily as needed (itching)  Quantity: 30 tablet  Refills: 0     ferrous gluconate 324 (38 Fe) MG tablet  Commonly known as: FERGON  Used for: Iron deficiency anemia, unspecified iron deficiency anemia type      Dose: 324 mg  Take 1 tablet (324 mg) by mouth daily  Refills: 0     fluticasone 50 MCG/ACT nasal spray  Commonly known as: FLONASE      Dose: 1-2 spray  Spray 1-2 sprays in nostril daily as needed  Refills: 0     gabapentin 100 MG capsule  Commonly known as: NEURONTIN  Used for: Acute right-sided low back pain with right-sided sciatica      Dose: 100 mg  Take 1 capsule (100 mg) by mouth 3 times daily  Refills: 0     glipiZIDE 2.5 MG 24 hr tablet  Commonly known as: GLUCOTROL XL      Dose: 2.5 mg  Take 2.5 mg by mouth daily  Refills: 0     HEMP OIL OR EXTRACT OR OTHER CBD CANNABINOID (NOT MEDICAL CANNABIS)      Dose: 1 chew tab  Take 1 chew tab by mouth at bedtime CBD gummy  Refills: 0     insulin glargine 100 UNIT/ML pen  Commonly known as: LANTUS PEN  Used for: Diabetic ulcer of toe of right foot associated with diabetes mellitus due to underlying condition, with necrosis of bone (H), Type 2 diabetes mellitus with other skin complication,  without long-term current use of insulin (H)      Dose: 8 Units  Inject 8 Units Subcutaneous 2 times daily Hold if Blood sugar less than 100.  Refills: 0     lactobacillus rhamnosus (GG) capsule      Dose: 2 capsule  Take 2 capsules by mouth daily Noon  Refills: 0     lisinopril 10 MG tablet  Commonly known as: ZESTRIL  Used for: Chronic diastolic heart failure (H)      Dose: 10 mg  Take 1 tablet (10 mg) by mouth every 24 hours  Quantity: 90 tablet  Refills: 3     melatonin 5 MG Caps      Dose: 5 mg  Take 5 mg by mouth at bedtime  Refills: 0     methocarbamol 500 MG tablet  Commonly known as: ROBAXIN  Used for: Acute right-sided low back pain with right-sided sciatica      Dose: 500 mg  Take 1 tablet (500 mg) by mouth 3 times daily as needed for muscle spasms  Quantity: 10 tablet  Refills: 0     nortriptyline 10 MG capsule  Commonly known as: PAMELOR      Dose: 20 mg  Take 20 mg by mouth nightly as needed  Refills: 0     oxyCODONE 5 MG tablet  Commonly known as: ROXICODONE  Used for: Acute right-sided low back pain with right-sided sciatica      Dose: 2.5-5 mg  Take 0.5-1 tablets (2.5-5 mg) by mouth every 6 hours as needed for moderate to severe pain (2.5 mg for moderate pain, 5 mg for severe pain.)  Quantity: 10 tablet  Refills: 0     polyethylene glycol 17 GM/Dose powder  Commonly known as: MIRALAX  Used for: Chronic low back pain without sciatica, unspecified back pain laterality      Dose: 17 g  Take 17 g by mouth daily as needed for constipation  Quantity: 225 g  Refills: 0     polyethylene glycol-propylene glycol 0.4-0.3 % Soln ophthalmic solution  Commonly known as: SYSTANE ULTRA      Dose: 1 drop  1 drop daily  Refills: 0     Semaglutide 14 MG tablet  Commonly known as: RYBELSUS  Used for: Type 2 diabetes, HbA1c goal < 7% (H)      Dose: 14 mg  Take 14 mg by mouth daily  Quantity: 30 tablet  Refills: 3     senna-docusate 8.6-50 MG tablet  Commonly known as: SENOKOT-S/PERICOLACE  Used for: Constipation,  unspecified constipation type      Dose: 1 tablet  Take 1 tablet by mouth 2 times daily as needed for constipation  Refills: 0     sodium bicarbonate 650 MG tablet  Used for: Acidosis      Dose: 1,950 mg  Take 3 tablets (1,950 mg) by mouth 3 times daily  Quantity: 180 tablet  Refills: 1     sodium chloride 0.9 % infusion      Dose: 10 mL  Inject 10 mLs into the vein continuously 24 hours before and after antibiotics  Refills: 0     torsemide 20 MG tablet  Commonly known as: DEMADEX  Used for: Essential hypertension      Dose: 20 mg  TAKE 1 TABLET DAILY  Quantity: 90 tablet  Refills: 3     Tums 500 MG chewable tablet  Generic drug: calcium carbonate      Dose: 1,000 mg  Take 1,000 mg by mouth 3 times daily as needed for heartburn  Refills: 0     ursodiol 300 MG capsule  Commonly known as: ACTIGALL  Used for: Biliary stricture (H28)      Dose: 300 mg  Take 1 capsule (300 mg) by mouth 2 times daily  Quantity: 60 capsule  Refills: 0            STOP taking      vancomycin 1500 mg/250 mL IVPB  Commonly known as: VANCOCIN  Stopped by: Darling Valdivia                 ASSESSMENT/PLAN  Encounter Diagnoses   Name Primary?     Physical deconditioning Yes     Pain management      Severe sepsis (H)        Pain management as needed Tylenol, as needed oxycodone, diclofenac gel, lidocaine patch, methocarbamol,  spinal cord stimulator    Mood disorder on nortriptyline     Physical deconditioning  PT/OT    Gout on colchicine    Bone infection on ertapenem until 8/6/2024, and vancomycin recently discontinued.  follow-up with infectious disease and neurosurgery,  pharmacy to dose antibiotics    Anemia on ferrous gluconate hemoglobin 9.2 on 7/8/24     Diabetes type 2 continue glipizide XL 2.5 mg daily, glargine 8 units subcu twice daily, semaglutide A1C 6/18/24 was 8.0      Electronically signed by: Darling Valdivia CNP       Sincerely,        Darling Valdivia CNP

## 2024-07-08 NOTE — PROGRESS NOTES
[facility] GERIATRIC SERVICES  Chief Complaint   Patient presents with    RECHECK     [facility] Medical Record Number:  [id number]  Place of Service where encounter took place:  [facility] ([facility]) [[id number]]  Code Status:  unknown     HISTORY:      HPI:  Lance Ibrahim  is 79 year old (1945) undergoing physical and occupational therapy.  He is with history of heart failure with preserved ejection fraction, hypertension, dyslipidemia, CKD, gout, BPH, type 2 diabetes and chronic low back pain for which he is folllowed by [facility] and has an implantable spine stimulator ([employer], MRI conditional 4/2024) in place.  Excerpted from records He initially presented to [facility] with acute on chronic, intractable pain with RLE radiculopathy.  For CT concerning for discitis-osteomyelitis at L4-5 with epidural/intraforaminal phlegmon vs abscess at L4-S1 with severe foraminal narrowing. Transferred to [facility] for Neurosurgery evaluation and ID consultation.      Hospitalization on 5/16-5/22/2024 due to severe sepsis related to UTI caused by Klebsiella oxytoca.     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  Acute on chronic back pain with RLE radiculopathy.  Severe spinal stenosis.  --Presented with acute on chronic back pain located on the right side of his back.  Significant tenderness to palpation at approximately the L3-L4 level on the right side with associated RLE radiculopathy. No bladder or bowel incontinence, no saddle anesthesia. Uses a walker for shorter distances and wheelchair for longer distances at baseline. No recent trauma or falls, but did get a trigger point injection by [facility] on 6/14.   *Afebrile since admission, hemodynamically stable.  WBC WNL. CRP 12.4.   *CT lumbar spine as above (review formal report for complete details)   *MRI lumbar spine Discitis osteomyelitis at L4-5. Epidural abscess in  the ventral epidural space, craniocaudal extension of 5 cm (from inferior L4 through inferior S1 level). At L4-5, severe spinal stenosis secondary to the epidural abscess. At L5-S1, moderate spinal stenosis secondary to the epidural abscess.  --Status post IR guided L4-L5 disc aspiration and L4 biopsy. 1 ml of rust colored cloudy fluid aspirated from L4-5 disc space. Large core specimen also obtained from L4 interior endplate  --Started on IV vancomycin on 6/19 post IR guided aspiration.    Blood cultures no growth to date.  Intraoperative biopsy, aspirate - no growth to date.  Noted allergies to penicillin, cephalosporins and sulfa.   --Infectious disease followed - Anticipate at least 6 weeks of IV antibiotics.   --Neurosurgery signed off, no surgical intervention.  Continue intravenous vancomycin (6/19).  PICC line placed 6/23.    Added ertapenem [6/24] for gram-negative coverage given recent Klebsiella bacteremia.  Vancomycin until 7/31, Ertapenem until 8/5    plan to repeat MRI in 6 weeks.     Bacteriuria with cultures negative  Recent severe sepsis in the setting of Klebsiella UTI:   Hospitalization on 5/16/2024-5/22/2024 due to severe sepsis related to UTI caused by Klebsiella oxytoca. Patient was on IV Vanco, Levaquin, Cefepime, and Ceftriaxone in the hospital. He was discharged on oral Cefdinir and Levaquin. No current sx of UTI.   -UA with trace blood in urine, few bacteria otherwise unremarkable.   Urine cultures with less than 10,000 mixed fred.  CT A/P on admission negative for renal calculi or hydronephrosis, note left renal cyst.   --On ertapenem as above.    Today he is seen to review VS, Labs, routine visit and for reports of hallucinations.  Patient tells writer that this was the first time he has had hallucinations.  He reported that a woman was in his room handing him a cup of tea and he reached out for it and realized his hand was empty.  He was alert and oriented x 4.  Currently on oxygen 92%  on 2l NC. chest x-ray done which showed no acute changes.  It was also reported that he had a stage II on his right heel however when reviewed that heel was scabbed over making it unstageable and orders placed for Skin-Prep twice daily and to float heels in bed.  Per his report he was supposed to have a uvulectomy done back when he was 9 years old.  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  He sleeps with 2-3 pillows.  He denied chest pain shortness of breath cough congestion constipation or diarrhea pain ,His pain is controlled with current medications.  He is with a spinal cord stimulator right lower back in which she reports he charges weekly.  He will continue on IV antibiotics.   A1c 2/20/2024 8.0 he will continue on weekly labs and daily weights for CHF however he denies this diagnosis.  He is with left knee weakness and difficulty ambulating. His left knee surgery was postponed until infection is clear.  CBC checked and white blood cell count within normal limits and hemoglobin 9.2. His  IV Vancomycin was discontinued and he continues on Ertapenum.      ALLERGIES:Ancef [cefazolin], Cephalexin, Penicillins, and Sulfa antibiotics    PAST MEDICAL HISTORY:   Past Medical History:   Diagnosis Date    Anemia     Arthritis     osteoarthritis knees    BPH     CAD (coronary artery disease)     subtotal occlusion in the small distal LAD     Cardiomyopathy     Cerebral artery occlusion with cerebral infarction     TIA 1993, no residual    Cervicalgia     CHF (congestive heart failure) (H)     Chronic kidney disease     Chronic low back pain     Chronic rhinitis     Gouty arthropathy     hands    Hyperlipidemia     Hypertension     Kidney stone     Nocturia     Obesity     Osteomyelitis (H) 08/2023    Foot    PVD (peripheral vascular disease)     Sleep apnea     doesn't use cpap    TIA (transient ischaemic attack) 1993    Type 2 diabetes mellitus - 11/08/2018    Ureteral stone         PAST SURGICAL HISTORY:   has a past surgical history that includes Diastasis recti repair (1985); Ventral hernia repair NOS (1987); ORIF Shoulder (Left); Colonoscopy (03/09/2013); hernia repair (07/13/2004); Foot surgery (04/2013); Amputate toe(s) (Left, 10/15/2018); Arthroplasty knee (Right, 12/07/2018); Heart Catheterization with Possible Intervention (Left, 03/05/2019); Extracapsular cataract extration with intraocular lens implant (Left, 03/13/2017); Extracapsular cataract extration with intraocular lens implant (Right); Amputate foot (Right, 08/07/2023); Esophagoscopy, gastroscopy, duodenoscopy (EGD), combined (N/A, 04/30/2024); Endoscopic retrograde cholangiopancreatogram (N/A, 04/30/2024); Spinal Cord Stimulator Implant (04/03/2024); Prostate surgery (02/2024); and IR Disc Aspriation Injection (6/19/2024).    FAMILY HISTORY: family history includes Alcohol/Drug in his paternal grandfather; Cancer in his father; Diabetes in his maternal grandfather; Family History Negative in his mother.    SOCIAL HISTORY:  reports that he quit smoking about 31 years ago. His smoking use included cigarettes. He has never used smokeless tobacco. He reports that he does not currently use alcohol. He reports that he does not use drugs.    ROS:  Constitutional: Negative for activity change, appetite change, fatigue and fever.   HENT: Negative for congestion.    Respiratory: Negative for cough, shortness of breath and wheezing.    Cardiovascular: Negative for chest pain and leg swelling.   Gastrointestinal: Negative for abdominal distention, abdominal pain, constipation, diarrhea and nausea.   Genitourinary: Negative for dysuria.   Musculoskeletal: positive  for arthralgia. Positive  for back pain.   Skin: Negative for color change and wound. PICC LINE    Neurological: Negative for dizziness.   Psychiatric/Behavioral: Negative for agitation, behavioral problems and confusion.     Physical Exam:  Constitutional:        Appearance: Patient is well-developed.   HENT:      Head: Normocephalic.   Eyes:      Conjunctiva/sclera: Conjunctivae normal.   Neck:      Musculoskeletal: Normal range of motion.   Cardiovascular:      Rate and Rhythm: Normal rate and regular rhythm.      Heart sounds: Normal heart sounds. No murmur.   Pulmonary:      Effort: No respiratory distress.      Breath sounds: Normal breath sounds.   no wheezing or rales.   Abdominal:      General: Bowel sounds are normal. There is no distension.      Palpations: Abdomen is soft.      Tenderness: There is no abdominal tenderness.   Musculoskeletal:       Normal range of motion.  decreased ROM LUE and LLE   Skin:General:        Skin is warm.   Neurological:         Mental Status: Patient is alert and oriented to person, place, and time.   Psychiatric:         Behavior: Behavior normal.     Vitals:/65   Pulse 85   Temp 97  F (36.1  C)   Resp 18   Ht 1.829 m (6')   Wt 87.1 kg (192 lb)   SpO2 92%   BMI 26.04 kg/m   and Body mass index is 26.04 kg/m .    Lab/Diagnostic data:   Recent Results (from the past 240 hour(s))   Creatinine    Collection Time: 07/02/24  6:26 AM   Result Value Ref Range    Creatinine 1.59 (H) 0.67 - 1.17 mg/dL    GFR Estimate 44 (L) >60 mL/min/1.73m2   Vancomycin level    Collection Time: 07/02/24  6:26 AM   Result Value Ref Range    Vancomycin 25.9 (HH)   ug/mL   CBC with platelets    Collection Time: 07/02/24  6:26 AM   Result Value Ref Range    WBC Count 8.0 4.0 - 11.0 10e3/uL    RBC Count 3.34 (L) 4.40 - 5.90 10e6/uL    Hemoglobin 9.4 (L) 13.3 - 17.7 g/dL    Hematocrit 31.4 (L) 40.0 - 53.0 %    MCV 94 78 - 100 fL    MCH 28.1 26.5 - 33.0 pg    MCHC 29.9 (L) 31.5 - 36.5 g/dL    RDW 17.3 (H) 10.0 - 15.0 %    Platelet Count 393 150 - 450 10e3/uL   Glucose (OUTREACH)    Collection Time: 07/02/24  6:26 AM   Result Value Ref Range    Glucose 138 (H) 70 - 99 mg/dL   CRP inflammation    Collection Time: 07/02/24  6:26 AM   Result Value Ref  Range    CRP Inflammation 74.30 (H) <5.00 mg/L   CRP inflammation    Collection Time: 07/03/24  8:40 AM   Result Value Ref Range    CRP Inflammation 53.20 (H) <5.00 mg/L   Creatinine    Collection Time: 07/03/24  8:40 AM   Result Value Ref Range    Creatinine 1.58 (H) 0.67 - 1.17 mg/dL    GFR Estimate 44 (L) >60 mL/min/1.73m2   AST    Collection Time: 07/03/24  8:40 AM   Result Value Ref Range    AST 30 0 - 45 U/L   Vancomycin level    Collection Time: 07/03/24  8:40 AM   Result Value Ref Range    Vancomycin 28.0 (HH)   ug/mL   CBC with platelets and differential    Collection Time: 07/03/24  8:40 AM   Result Value Ref Range    WBC Count 8.5 4.0 - 11.0 10e3/uL    RBC Count 3.57 (L) 4.40 - 5.90 10e6/uL    Hemoglobin 10.0 (L) 13.3 - 17.7 g/dL    Hematocrit 33.8 (L) 40.0 - 53.0 %    MCV 95 78 - 100 fL    MCH 28.0 26.5 - 33.0 pg    MCHC 29.6 (L) 31.5 - 36.5 g/dL    RDW 17.6 (H) 10.0 - 15.0 %    Platelet Count 458 (H) 150 - 450 10e3/uL    % Neutrophils 72 %    % Lymphocytes 15 %    % Monocytes 9 %    % Eosinophils 2 %    % Basophils 1 %    % Immature Granulocytes 1 %    NRBCs per 100 WBC 0 <1 /100    Absolute Neutrophils 6.1 1.6 - 8.3 10e3/uL    Absolute Lymphocytes 1.3 0.8 - 5.3 10e3/uL    Absolute Monocytes 0.8 0.0 - 1.3 10e3/uL    Absolute Eosinophils 0.2 0.0 - 0.7 10e3/uL    Absolute Basophils 0.1 0.0 - 0.2 10e3/uL    Absolute Immature Granulocytes 0.1 <=0.4 10e3/uL    Absolute NRBCs 0.0 10e3/uL   CBC with platelets and differential    Collection Time: 07/09/24  5:49 AM   Result Value Ref Range    WBC Count 5.4 4.0 - 11.0 10e3/uL    RBC Count 3.28 (L) 4.40 - 5.90 10e6/uL    Hemoglobin 9.2 (L) 13.3 - 17.7 g/dL    Hematocrit 31.7 (L) 40.0 - 53.0 %    MCV 97 78 - 100 fL    MCH 28.0 26.5 - 33.0 pg    MCHC 29.0 (L) 31.5 - 36.5 g/dL    RDW 18.4 (H) 10.0 - 15.0 %    Platelet Count 373 150 - 450 10e3/uL    % Neutrophils 50 %    % Lymphocytes 31 %    % Monocytes 13 %    % Eosinophils 4 %    % Basophils 1 %    % Immature  Granulocytes 1 %    NRBCs per 100 WBC 0 <1 /100    Absolute Neutrophils 2.7 1.6 - 8.3 10e3/uL    Absolute Lymphocytes 1.7 0.8 - 5.3 10e3/uL    Absolute Monocytes 0.7 0.0 - 1.3 10e3/uL    Absolute Eosinophils 0.2 0.0 - 0.7 10e3/uL    Absolute Basophils 0.1 0.0 - 0.2 10e3/uL    Absolute Immature Granulocytes 0.0 <=0.4 10e3/uL    Absolute NRBCs 0.0 10e3/uL        MEDICATIONS:  MED REC REQUIRED  Post Medication Reconciliation Status: discharge medications reconciled, continue medications without change          Review of your medicines            Accurate as of July 8, 2024 11:59 PM. If you have any questions, ask your nurse or doctor.                CONTINUE these medicines which may have CHANGED, or have new prescriptions. If we are uncertain of the size of tablets/capsules you have at home, strength may be listed as something that might have changed.        Dose / Directions   emollient external cream  This may have changed:   when to take this  reasons to take this  Used for: Rash      Apply topically 3 times daily  Quantity: 113 g  Refills: 3     Lidocaine 4 % Patch  Commonly known as: LIDOCARE  This may have changed: Another medication with the same name was removed. Continue taking this medication, and follow the directions you see here.  Changed by: Darling Valdivia      Dose: 1 patch  Place 1 patch onto the skin at bedtime To prevent lidocaine toxicity, patient should be patch free for 12 hrs daily.  Refills: 0            CONTINUE these medicines which have NOT CHANGED        Dose / Directions   acetaminophen 325 MG tablet  Commonly known as: TYLENOL  Used for: Acute right-sided low back pain with right-sided sciatica      Dose: 650 mg  Take 2 tablets (650 mg) by mouth every 6 hours as needed for pain (and adjunct with moderate or severe pain or per patient request)  Refills: 0     aspirin 81 MG EC tablet  Used for: Coronary artery disease involving native coronary artery of native heart without angina pectoris       Dose: 81 mg  Take 1 tablet (81 mg) by mouth daily  Refills: 0     colchicine 0.6 MG tablet  Commonly known as: COLCRYS  Used for: Gouty arthropathy      TAKE 1 TABLET DAILY  Quantity: 90 tablet  Refills: 3     diclofenac 1 % topical gel  Commonly known as: VOLTAREN      Dose: 4 g  Apply 4 g topically 4 times daily as needed for moderate pain Left knee  Refills: 0     ertapenem 1 GM vial  Commonly known as: INVanz  Indication: Infection of Bone and Bone Marrow  Used for: Lumbar discitis      Dose: 1 g  Inject 1 g into the vein every 24 hours for 42 doses  Refills: 0     famotidine 20 MG tablet  Commonly known as: PEPCID      Dose: 20 mg  Take 1 tablet (20 mg) by mouth 2 times daily as needed (itching)  Quantity: 30 tablet  Refills: 0     ferrous gluconate 324 (38 Fe) MG tablet  Commonly known as: FERGON  Used for: Iron deficiency anemia, unspecified iron deficiency anemia type      Dose: 324 mg  Take 1 tablet (324 mg) by mouth daily  Refills: 0     fluticasone 50 MCG/ACT nasal spray  Commonly known as: FLONASE      Dose: 1-2 spray  Spray 1-2 sprays in nostril daily as needed  Refills: 0     gabapentin 100 MG capsule  Commonly known as: NEURONTIN  Used for: Acute right-sided low back pain with right-sided sciatica      Dose: 100 mg  Take 1 capsule (100 mg) by mouth 3 times daily  Refills: 0     glipiZIDE 2.5 MG 24 hr tablet  Commonly known as: GLUCOTROL XL      Dose: 2.5 mg  Take 2.5 mg by mouth daily  Refills: 0     HEMP OIL OR EXTRACT OR OTHER CBD CANNABINOID (NOT MEDICAL CANNABIS)      Dose: 1 chew tab  Take 1 chew tab by mouth at bedtime CBD gummy  Refills: 0     insulin glargine 100 UNIT/ML pen  Commonly known as: LANTUS PEN  Used for: Diabetic ulcer of toe of right foot associated with diabetes mellitus due to underlying condition, with necrosis of bone (H), Type 2 diabetes mellitus with other skin complication, without long-term current use of insulin (H)      Dose: 8 Units  Inject 8 Units Subcutaneous 2  times daily Hold if Blood sugar less than 100.  Refills: 0     lactobacillus rhamnosus (GG) capsule      Dose: 2 capsule  Take 2 capsules by mouth daily Noon  Refills: 0     lisinopril 10 MG tablet  Commonly known as: ZESTRIL  Used for: Chronic diastolic heart failure (H)      Dose: 10 mg  Take 1 tablet (10 mg) by mouth every 24 hours  Quantity: 90 tablet  Refills: 3     melatonin 5 MG Caps      Dose: 5 mg  Take 5 mg by mouth at bedtime  Refills: 0     methocarbamol 500 MG tablet  Commonly known as: ROBAXIN  Used for: Acute right-sided low back pain with right-sided sciatica      Dose: 500 mg  Take 1 tablet (500 mg) by mouth 3 times daily as needed for muscle spasms  Quantity: 10 tablet  Refills: 0     nortriptyline 10 MG capsule  Commonly known as: PAMELOR      Dose: 20 mg  Take 20 mg by mouth nightly as needed  Refills: 0     oxyCODONE 5 MG tablet  Commonly known as: ROXICODONE  Used for: Acute right-sided low back pain with right-sided sciatica      Dose: 2.5-5 mg  Take 0.5-1 tablets (2.5-5 mg) by mouth every 6 hours as needed for moderate to severe pain (2.5 mg for moderate pain, 5 mg for severe pain.)  Quantity: 10 tablet  Refills: 0     polyethylene glycol 17 GM/Dose powder  Commonly known as: MIRALAX  Used for: Chronic low back pain without sciatica, unspecified back pain laterality      Dose: 17 g  Take 17 g by mouth daily as needed for constipation  Quantity: 225 g  Refills: 0     polyethylene glycol-propylene glycol 0.4-0.3 % Soln ophthalmic solution  Commonly known as: SYSTANE ULTRA      Dose: 1 drop  1 drop daily  Refills: 0     Semaglutide 14 MG tablet  Commonly known as: RYBELSUS  Used for: Type 2 diabetes, HbA1c goal < 7% (H)      Dose: 14 mg  Take 14 mg by mouth daily  Quantity: 30 tablet  Refills: 3     senna-docusate 8.6-50 MG tablet  Commonly known as: SENOKOT-S/PERICOLACE  Used for: Constipation, unspecified constipation type      Dose: 1 tablet  Take 1 tablet by mouth 2 times daily as needed for  constipation  Refills: 0     sodium bicarbonate 650 MG tablet  Used for: Acidosis      Dose: 1,950 mg  Take 3 tablets (1,950 mg) by mouth 3 times daily  Quantity: 180 tablet  Refills: 1     sodium chloride 0.9 % infusion      Dose: 10 mL  Inject 10 mLs into the vein continuously 24 hours before and after antibiotics  Refills: 0     torsemide 20 MG tablet  Commonly known as: DEMADEX  Used for: Essential hypertension      Dose: 20 mg  TAKE 1 TABLET DAILY  Quantity: 90 tablet  Refills: 3     Tums 500 MG chewable tablet  Generic drug: calcium carbonate      Dose: 1,000 mg  Take 1,000 mg by mouth 3 times daily as needed for heartburn  Refills: 0     ursodiol 300 MG capsule  Commonly known as: ACTIGALL  Used for: Biliary stricture (H28)      Dose: 300 mg  Take 1 capsule (300 mg) by mouth 2 times daily  Quantity: 60 capsule  Refills: 0            STOP taking      vancomycin 1500 mg/250 mL IVPB  Commonly known as: VANCOCIN  Stopped by: Darling Valdivia                 ASSESSMENT/PLAN  Encounter Diagnoses   Name Primary?    Physical deconditioning Yes    Pain management     Severe sepsis (H)        Pain management as needed Tylenol, as needed oxycodone, diclofenac gel, lidocaine patch, methocarbamol,  spinal cord stimulator    Mood disorder on nortriptyline     Physical deconditioning  PT/OT    Gout on colchicine    Bone infection on ertapenem until 8/6/2024, and vancomycin recently discontinued.  follow-up with infectious disease and neurosurgery,  pharmacy to dose antibiotics    Anemia on ferrous gluconate hemoglobin 9.2 on 7/8/24     Diabetes type 2 continue glipizide XL 2.5 mg daily, glargine 8 units subcu twice daily, semaglutide A1C 6/18/24 was 8.0      Electronically signed by: Darling Valdivia CNP

## 2024-07-09 LAB
BASOPHILS # BLD AUTO: 0.1 10E3/UL (ref 0–0.2)
BASOPHILS NFR BLD AUTO: 1 %
EOSINOPHIL # BLD AUTO: 0.2 10E3/UL (ref 0–0.7)
EOSINOPHIL NFR BLD AUTO: 4 %
ERYTHROCYTE [DISTWIDTH] IN BLOOD BY AUTOMATED COUNT: 18.4 % (ref 10–15)
HCT VFR BLD AUTO: 31.7 % (ref 40–53)
HGB BLD-MCNC: 9.2 G/DL (ref 13.3–17.7)
IMM GRANULOCYTES # BLD: 0 10E3/UL
IMM GRANULOCYTES NFR BLD: 1 %
LYMPHOCYTES # BLD AUTO: 1.7 10E3/UL (ref 0.8–5.3)
LYMPHOCYTES NFR BLD AUTO: 31 %
MCH RBC QN AUTO: 28 PG (ref 26.5–33)
MCHC RBC AUTO-ENTMCNC: 29 G/DL (ref 31.5–36.5)
MCV RBC AUTO: 97 FL (ref 78–100)
MONOCYTES # BLD AUTO: 0.7 10E3/UL (ref 0–1.3)
MONOCYTES NFR BLD AUTO: 13 %
NEUTROPHILS # BLD AUTO: 2.7 10E3/UL (ref 1.6–8.3)
NEUTROPHILS NFR BLD AUTO: 50 %
NRBC # BLD AUTO: 0 10E3/UL
NRBC BLD AUTO-RTO: 0 /100
PLATELET # BLD AUTO: 373 10E3/UL (ref 150–450)
RBC # BLD AUTO: 3.28 10E6/UL (ref 4.4–5.9)
WBC # BLD AUTO: 5.4 10E3/UL (ref 4–11)

## 2024-07-09 PROCEDURE — 36415 COLL VENOUS BLD VENIPUNCTURE: CPT | Mod: ORL | Performed by: NURSE PRACTITIONER

## 2024-07-09 PROCEDURE — P9604 ONE-WAY ALLOW PRORATED TRIP: HCPCS | Mod: ORL | Performed by: NURSE PRACTITIONER

## 2024-07-09 PROCEDURE — 85025 COMPLETE CBC W/AUTO DIFF WBC: CPT | Mod: ORL | Performed by: NURSE PRACTITIONER

## 2024-07-10 ENCOUNTER — LAB REQUISITION (OUTPATIENT)
Dept: LAB | Facility: CLINIC | Age: 79
End: 2024-07-10
Payer: COMMERCIAL

## 2024-07-10 ENCOUNTER — TELEPHONE (OUTPATIENT)
Dept: NEUROSURGERY | Facility: CLINIC | Age: 79
End: 2024-07-10

## 2024-07-10 ENCOUNTER — OFFICE VISIT (OUTPATIENT)
Dept: INTERNAL MEDICINE | Facility: CLINIC | Age: 79
End: 2024-07-10
Payer: COMMERCIAL

## 2024-07-10 VITALS
RESPIRATION RATE: 18 BRPM | HEIGHT: 72 IN | BODY MASS INDEX: 26.03 KG/M2 | HEART RATE: 108 BPM | OXYGEN SATURATION: 98 % | DIASTOLIC BLOOD PRESSURE: 70 MMHG | SYSTOLIC BLOOD PRESSURE: 116 MMHG | TEMPERATURE: 97.8 F

## 2024-07-10 DIAGNOSIS — I50.22 CHRONIC SYSTOLIC CONGESTIVE HEART FAILURE (H): ICD-10-CM

## 2024-07-10 DIAGNOSIS — I25.10 CORONARY ARTERY DISEASE INVOLVING NATIVE CORONARY ARTERY OF NATIVE HEART WITHOUT ANGINA PECTORIS: ICD-10-CM

## 2024-07-10 DIAGNOSIS — N18.32 STAGE 3B CHRONIC KIDNEY DISEASE (H): ICD-10-CM

## 2024-07-10 DIAGNOSIS — M46.26 OSTEOMYELITIS OF VERTEBRA, LUMBAR REGION (H): ICD-10-CM

## 2024-07-10 DIAGNOSIS — Z01.818 PREOP GENERAL PHYSICAL EXAM: ICD-10-CM

## 2024-07-10 DIAGNOSIS — G06.2 EXTRADURAL AND SUBDURAL ABSCESS, UNSPECIFIED: ICD-10-CM

## 2024-07-10 DIAGNOSIS — Z01.818 PREOP EXAMINATION: Primary | ICD-10-CM

## 2024-07-10 DIAGNOSIS — I42.9 CARDIOMYOPATHY, UNSPECIFIED TYPE (H): ICD-10-CM

## 2024-07-10 LAB
ATRIAL RATE - MUSE: 103 BPM
DIASTOLIC BLOOD PRESSURE - MUSE: NORMAL MMHG
INTERPRETATION ECG - MUSE: NORMAL
P AXIS - MUSE: 92 DEGREES
PR INTERVAL - MUSE: 184 MS
QRS DURATION - MUSE: 106 MS
QT - MUSE: 354 MS
QTC - MUSE: 463 MS
R AXIS - MUSE: -22 DEGREES
SYSTOLIC BLOOD PRESSURE - MUSE: NORMAL MMHG
T AXIS - MUSE: 96 DEGREES
VENTRICULAR RATE- MUSE: 103 BPM

## 2024-07-10 PROCEDURE — 93005 ELECTROCARDIOGRAM TRACING: CPT | Performed by: NURSE PRACTITIONER

## 2024-07-10 PROCEDURE — 93010 ELECTROCARDIOGRAM REPORT: CPT | Performed by: INTERNAL MEDICINE

## 2024-07-10 PROCEDURE — 99215 OFFICE O/P EST HI 40 MIN: CPT | Performed by: NURSE PRACTITIONER

## 2024-07-10 NOTE — PATIENT INSTRUCTIONS
You will need clearance from both her nephrologist and cardiologist prior to having your surgery by your gastroenterologist.  I placed referrals to help facilitate you getting clearance from them.  They should call you to schedule these appointments.

## 2024-07-10 NOTE — PROGRESS NOTES
Preoperative Evaluation  [facility]  [address] [address]  Phone: [phone]  Fax: [phone]  Primary Provider: Rickey Adamson MD  Pre-op Performing Provider: Mercedes Leiva NP  Jul 10, 2024             7/10/2024   Surgical Information   What procedure is being done? Endoscopic retrograde cholangiopancreatography   Facility or Hospital where procedure/surgery will be performed: [facility]   Who is doing the procedure / surgery? Dr. Gongora   Date of surgery / procedure: july 10 2024   Time of surgery / procedure: 10   Where do you plan to recover after surgery? at a TCU (Transitional Care Unit)        Fax number for surgical facility: Note does not need to be faxed, will be available electronically in Epic.    Assessment & Plan     The proposed surgical procedure is considered INTERMEDIATE risk.      Preop general physical exam  Cannot clear patient at this time due to current treatment of osteomyelitis being managed by nephrology and declining renal function.  Also needs clearance from cardiology due to significant cardiac history and changes on his EKG showing sinus tachycardia with incomplete left bundle branch block that has changed since previous EKG on 5/17/2024.  Once he obtains clearance from cardiology and nephrology a new preoperative examination could be performed at that time.   - EKG 12-lead, tracing only    Stage 3b chronic kidney disease (H)    - Adult Nephrology  Referral; Future    Coronary artery disease involving native coronary artery of native heart without angina pectoris    - Adult Cardiology Eval  Referral; Future      Chronic systolic congestive heart failure (H)    - Adult Cardiology Eval  Referral; Future    Cardiomyopathy, unspecified type (H)    - Adult Cardiology Eval  Referral; Future    MED REC REQUIRED  Post Medication Reconciliation Status:         Risks and Recommendations  The  patient has the following additional risks and recommendations for perioperative complications:  Cardiovascular:   - Needs cardiology clearance.   Anemia/Bleeding/Clotting:    - Anemia and does not require treatment prior to surgery. Monitor hemoglobin postoperatively  Infection:    - recent treatment of osteomyelitis with vancomycin.  Currently following up with nephrology      Recommendation  Patient referred to cardiology and nephrology for evaluation before surgery. Surgery approval pending completion of consultation.    Subjective   Lance is a 79 year old, presenting for the following:  Pre-Op Exam (Endoscopic retrograde pancreatography on 7/12 at [facility] by Dr Linares)          7/10/2024    10:11 AM   Additional Questions   Roomed by Isa ABRAHAM related to upcoming procedure: Lance is an 79-year-old male with a significant past medical history here today for preoperative examination prior to having abdominal surgery.  States he had a stent placed due to some drainage issue that now needs to be removed.  Reviewed previous documentation that he had shown biliary obstruction with strictures and had the surgery on 4/30/2024 with an ERCP with stent placement.        7/10/2024   Pre-Op Questionnaire   Have you ever had a heart attack or stroke? (!) YES    Have you ever had surgery on your heart or blood vessels, such as a stent placement, a coronary artery bypass, or surgery on an artery in your head, neck, heart, or legs? (!) YES    Do you have chest pain with activity? No   Do you have a history of heart failure? No   Do you currently have a cold, bronchitis or symptoms of other infection? No   Do you have a cough, shortness of breath, or wheezing? No   Do you or anyone in your family have previous history of blood clots? No   Do you or does anyone in your family have a serious bleeding problem such as prolonged bleeding following surgeries or cuts? No   Have you ever had problems with anemia or been told to  take iron pills? (!) YES chronic anemia   Have you had any abnormal blood loss such as black, tarry or bloody stools? No   Have you ever had a blood transfusion? No   Are you willing to have a blood transfusion if it is medically needed before, during, or after your surgery? Yes   Have you or any of your relatives ever had problems with anesthesia? No   Do you have sleep apnea, excessive snoring or daytime drowsiness? (!) YES   Do you have a CPAP machine? (!) NO    Do you have any artifical heart valves or other implanted medical devices like a pacemaker, defibrillator, or continuous glucose monitor? (!) YES   What type of device do you have? spinal cord stimulator   Name of the clinic that manages your device [facility]   Do you have artificial joints? (!) YES   Are you allergic to latex? No        Health Care Directive  Patient has a Health Care Directive on file      Preoperative Review of    reviewed - controlled substances reflected in medication list.        Patient Active Problem List    Diagnosis Date Noted    Type 2 diabetes, HbA1c goal < 7% (H) 02/22/2013     Priority: High    Hypertension 07/06/2008     Priority: High    Transient cerebral ischemia 07/09/2004     Priority: High     Problem list name updated by automated process. Provider to review      Back pain 06/18/2024     Priority: Medium    Bacteremia 05/19/2024     Priority: Medium    Acute cystitis without hematuria 05/19/2024     Priority: Medium    Hypoxia 05/16/2024     Priority: Medium    Fall, initial encounter 05/16/2024     Priority: Medium    Abdominal pain 04/30/2024     Priority: Medium    Hyperglycemia 04/26/2024     Priority: Medium    Elevated LFTs 04/26/2024     Priority: Medium    Elevated lipase 04/26/2024     Priority: Medium    Diffuse papular rash 04/26/2024     Priority: Medium    Enlarged prostate 04/16/2024     Priority: Medium    Cerebrovascular accident (CVA) due to bilateral embolism of posterior  cerebral arteries (H) 03/04/2024     Priority: Medium    Hypertensive heart and kidney disease 03/04/2024     Priority: Medium    Insomnia 03/04/2024     Priority: Medium    Iron deficiency anemia 03/04/2024     Priority: Medium    Osteoarthritis of left knee 03/04/2024     Priority: Medium    Post-traumatic osteoarthritis 03/04/2024     Priority: Medium    Coronary artery disease involving native coronary artery of native heart without angina pectoris 08/10/2023     Priority: Medium    Chronic systolic congestive heart failure (H) 08/08/2023     Priority: Medium    Osteomyelitis of ankle and foot (H) 08/07/2023     Priority: Medium    Cellulitis and abscess of foot excluding toe 07/25/2023     Priority: Medium    Diabetic ulcer of toe of right foot associated with diabetes mellitus due to underlying condition, with necrosis of bone (H) 07/25/2023     Priority: Medium    Slowing of urinary stream 02/01/2023     Priority: Medium    Nocturia 02/01/2023     Priority: Medium    Diabetic ulcer of toe of right foot associated with diabetes mellitus due to underlying condition, limited to breakdown of skin (H) 01/26/2023     Priority: Medium    Muscle pain 12/19/2022     Priority: Medium    Diabetic neuropathy associated with type 2 diabetes mellitus (H) 10/17/2022     Priority: Medium    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 10/14/2022     Priority: Medium    Cellulitis of right lower extremity 08/25/2022     Priority: Medium    H/O amputation of lesser toe, left (H24) 01/26/2022     Priority: Medium    Acute idiopathic gout of right hand 12/27/2021     Priority: Medium    Other constipation 12/09/2021     Priority: Medium    Acute renal insufficiency 12/03/2021     Priority: Medium    Acute right-sided thoracic back pain 12/03/2021     Priority: Medium    Ulcerated, foot, unspecified laterality, limited to breakdown of skin (H) 07/28/2021     Priority: Medium    Ulcerated, foot (H) 07/27/2020     Priority: Medium    Obesity (BMI  35.0-39.9) with comorbidity (H) 02/05/2020     Priority: Medium    Stage 3b chronic kidney disease (H) 02/05/2020     Priority: Medium    Shortness of breath 03/04/2019     Priority: Medium     Added automatically from request for surgery [id number]      Renal colic 03/04/2019     Priority: Medium    Dyspnea on exertion 02/22/2019     Priority: Medium     Added automatically from request for surgery [id number]      Chest pain, unspecified type 02/22/2019     Priority: Medium     Added automatically from request for surgery [id number]      Cardiomyopathy, unspecified type (H) 02/22/2019     Priority: Medium     Added automatically from request for surgery [id number]      Skin rash 12/08/2018     Priority: Medium    Total knee replacement status 12/07/2018     Priority: Medium    Amputated toe, left (H24) 11/08/2018     Priority: Medium    Severe sepsis (H) 05/01/2018     Priority: Medium    Cervicalgia 02/24/2017     Priority: Medium    Lower urinary tract symptoms 04/07/2016     Priority: Medium    Type II diabetes mellitus (H) 07/09/2013     Priority: Medium    Gouty arthropathy 07/09/2013     Priority: Medium    CARDIOVASCULAR SCREENING; LDL GOAL LESS THAN 100 10/31/2010     Priority: Medium    Hypertrophy of prostate with urinary obstruction 07/09/2004     Priority: Medium     Problem list name updated by automated process. Provider to review      Ventral hernia 06/28/2004     Priority: Medium     Problem list name updated by automated process. Provider to review      Chronic low back pain 01/01/2000     Priority: Medium    Gout 07/20/2012     Priority: Low    Chronic rhinitis 07/09/2004     Priority: Low      Past Medical History:   Diagnosis Date    Anemia     Arthritis     osteoarthritis knees    BPH     CAD (coronary artery disease)     subtotal occlusion in the small distal LAD     Cardiomyopathy     Cerebral artery occlusion with cerebral infarction     TIA 1993, no residual    Cervicalgia     CHF (congestive heart  failure) (H)     Chronic kidney disease     Chronic low back pain     Chronic rhinitis     Gouty arthropathy     hands    Hyperlipidemia     Hypertension     Kidney stone     Nocturia     Obesity     Osteomyelitis (H) 08/2023    Foot    PVD (peripheral vascular disease)     Sleep apnea     doesn't use cpap    TIA (transient ischaemic attack) 1993    Type 2 diabetes mellitus - 11/08/2018    Ureteral stone      Past Surgical History:   Procedure Laterality Date    AMPUTATE FOOT Right 08/07/2023    Procedure: AMPUTATION, right hallux;  Surgeon: Nakul Salazar DPM;  Location: [facility] OR    AMPUTATE TOE(S) Left 10/15/2018    Procedure: AMPUTATE TOE(S);  Left third toe amputation;  Surgeon: Ayaka Azevedo DPM, Podiatry/Foot and Ankle Surgery;  Location:  OR    ARTHROPLASTY KNEE Right 12/07/2018    Procedure: Right total knee arthroplasty;  Surgeon: Issa Cunha MD;  Location:  OR    COLONOSCOPY  03/09/2013    Procedure: COLONOSCOPY;  COLONOSCOPY;  Surgeon: Chau Hogan MD;  Location:  GI    CV HEART CATHETERIZATION WITH POSSIBLE INTERVENTION Left 03/05/2019    Procedure: Coronary Angiogram;  Surgeon: Nas Linda MD;  Location: [facility] CARDIAC CATH LAB    Diastasis recti repair  1985    ENDOSCOPIC RETROGRADE CHOLANGIOPANCREATOGRAM N/A 04/30/2024    Procedure: ENDOSCOPIC RETROGRADE CHOLANGIOPANCREATOGRAPHY, BILIARY SPHINCTEROTOMY, DILATION, BRUSHING, BIOPSIES with DISTAL EXTRA HEPATIC BILE DUCT STRICTURE;  Surgeon: Aldo Gongora MD;  Location: [facility] OR    ESOPHAGOSCOPY, GASTROSCOPY, DUODENOSCOPY (EGD), COMBINED N/A 04/30/2024    Procedure: ESOPHAGOGASTRODUODENOSCOPY, WITH ENDOSCOPIC ULTRASOUND GUIDANCE;  Surgeon: Aldo Gongora MD;  Location: [facility] OR    EXTRACAPSULAR CATARACT EXTRATION WITH INTRAOCULAR LENS IMPLANT Left 03/13/2017    EXTRACAPSULAR CATARACT EXTRATION WITH INTRAOCULAR LENS IMPLANT Right     FOOT SURGERY  04/2013    cyst removal     HERNIA  REPAIR  07/13/2004    ventral     IR DISC ASPIRATION INJECTION  6/19/2024    ORIF Shoulder Left     PROSTATE SURGERY  02/2024    Urolift    SPINAL CORD STIMULATOR IMPLANT  04/03/2024    Ventral hernia repair NOS  1987     Current Outpatient Medications   Medication Sig Dispense Refill    acetaminophen (TYLENOL) 325 MG tablet Take 2 tablets (650 mg) by mouth every 6 hours as needed for pain (and adjunct with moderate or severe pain or per patient request)      aspirin 81 MG EC tablet Take 1 tablet (81 mg) by mouth daily      calcium carbonate (TUMS) 500 MG chewable tablet Take 1,000 mg by mouth 3 times daily as needed for heartburn      colchicine (COLCYRS) 0.6 MG tablet TAKE 1 TABLET DAILY 90 tablet 3    diclofenac (VOLTAREN) 1 % topical gel Apply 4 g topically 4 times daily as needed for moderate pain Left knee      ertapenem (INVANZ) 1 GM vial Inject 1 g into the vein every 24 hours for 42 doses      famotidine (PEPCID) 20 MG tablet Take 1 tablet (20 mg) by mouth 2 times daily as needed (itching) 30 tablet 0    ferrous gluconate (FERGON) 324 (38 Fe) MG tablet Take 1 tablet (324 mg) by mouth daily      fluticasone (FLONASE) 50 MCG/ACT nasal spray Spray 1-2 sprays in nostril daily as needed      glipiZIDE (GLUCOTROL XL) 2.5 MG 24 hr tablet Take 2.5 mg by mouth daily      insulin glargine (LANTUS PEN) 100 UNIT/ML pen Inject 8 Units Subcutaneous 2 times daily Hold if Blood sugar less than 100.      lactobacillus rhamnosus, GG, (CULTURELL) capsule Take 2 capsules by mouth daily Noon      Lidocaine (LIDOCARE) 4 % Patch Place 1 patch onto the skin at bedtime To prevent lidocaine toxicity, patient should be patch free for 12 hrs daily.      lisinopril (ZESTRIL) 10 MG tablet Take 1 tablet (10 mg) by mouth every 24 hours 90 tablet 3    melatonin 5 MG CAPS Take 5 mg by mouth at bedtime      nortriptyline (PAMELOR) 10 MG capsule Take 20 mg by mouth nightly as needed      oxyCODONE (ROXICODONE) 5 MG tablet Take 0.5-1 tablets  "(2.5-5 mg) by mouth every 6 hours as needed for moderate to severe pain (2.5 mg for moderate pain, 5 mg for severe pain.) 10 tablet 0    polyethylene glycol (MIRALAX) 17 GM/Dose powder Take 17 g by mouth daily as needed for constipation 225 g 0    polyethylene glycol-propylene glycol (SYSTANE ULTRA) 0.4-0.3 % SOLN ophthalmic solution 1 drop daily      sodium bicarbonate 650 MG tablet Take 3 tablets (1,950 mg) by mouth 3 times daily 180 tablet 1    sodium chloride 0.9% infusion Inject 10 mLs into the vein continuously 24 hours before and after antibiotics      torsemide (DEMADEX) 20 MG tablet TAKE 1 TABLET DAILY 90 tablet 3    ursodiol (ACTIGALL) 300 MG capsule Take 1 capsule (300 mg) by mouth 2 times daily 60 capsule 0    emollient (VANICREAM) external cream Apply topically 3 times daily (Patient taking differently: Apply topically 3 times daily as needed) 113 g 3    gabapentin (NEURONTIN) 100 MG capsule Take 1 capsule (100 mg) by mouth 3 times daily      HEMP OIL OR EXTRACT OR OTHER CBD CANNABINOID, NOT MEDICAL CANNABIS, Take 1 chew tab by mouth at bedtime CBD gummy (Patient not taking: Reported on 2024)      methocarbamol (ROBAXIN) 500 MG tablet Take 1 tablet (500 mg) by mouth 3 times daily as needed for muscle spasms 10 tablet 0    Semaglutide (RYBELSUS) 14 MG tablet Take 14 mg by mouth daily (Patient not taking: Reported on 7/10/2024) 30 tablet 3    senna-docusate (SENOKOT-S/PERICOLACE) 8.6-50 MG tablet Take 1 tablet by mouth 2 times daily as needed for constipation         Allergies   Allergen Reactions    Ancef [Cefazolin] Rash    Cephalexin Itching and Rash     Allergic reaction required hospitalization 2024    Penicillins Anaphylaxis    Sulfa Antibiotics      \"itchy rash, swelling of face and hands\"        Social History     Tobacco Use    Smoking status: Former     Current packs/day: 0.00     Types: Cigarettes     Quit date: 3/17/1993     Years since quittin.3     Passive exposure: Never    " "Smokeless tobacco: Never   Substance Use Topics    Alcohol use: Not Currently     History   Drug Use No         Constitutional: No fever or unexplained weight loss.  No severe fatigue.    HEENT: Negative for ear mouth or throat problems.     Eyes: Denies any changes in vision or eye pain.    Respiratory: Negative for cough, wheezing or shortness of breath.    Cardiovascular: No palpitations, tachycardia, chest pain or lower extremity edema.    Gastrointestinal: Negative for nausea, vomiting, abdominal pain, uncontrolled heartburn or changes in bowel movements.    Musculoskeletal: No significant arthralgia or myalgias    Neurological: Negative for severe dizziness, headaches or numbness.    Psychiatric:  Denies changes in mood or affect    Allergic/immunologic: Negative for any history of complications with anesthesia. No known exposure to HIV or hepatitis C.         Objective    /70 (BP Location: Left arm, Patient Position: Sitting)   Pulse 108   Temp 97.8  F (36.6  C)   Resp 18   Ht 1.829 m (6' 0.01\")   SpO2 98%   BMI 26.03 kg/m     Estimated body mass index is 26.03 kg/m  as calculated from the following:    Height as of this encounter: 1.829 m (6' 0.01\").    Weight as of 7/8/24: 87.1 kg (192 lb).  Physical Exam  Constitutional: In no acute distress.  Clean appearance.  Cardiovascular: Regular rate.  Respiratory: Normal respiratory effort.  Skin: Skin is pink, warm and dry.     Musculoskeletal: Gait normal.  Able to mount exam table without difficulties.  Psychiatric: Appropriate affect and demeanor.  Memory intact.  Good insight and judgment.  Neurologic: No tremor or involuntary movement noted.      Recent Labs   Lab Test 07/09/24  0549 07/03/24  0840 07/02/24  0626 06/27/24  1106 06/24/24  0739 06/22/24  0851 06/21/24  0905 06/20/24  0829 06/19/24  1316 06/19/24  0710 06/18/24  0845 04/26/24  0852 04/26/24  0430 03/27/24  1338 02/27/24  1113   HGB 9.2* 10.0* 9.4*   < > 9.4*  --   --  10.9*  --    < " > 9.7*   < > 11.9*   < > 12.3*    458* 393   < >  --   --   --   --   --    < > 284   < > 253   < > 234   INR  --   --   --   --   --   --   --   --  1.04  --   --   --  1.11  --   --    NA  --   --   --   --  138  --   --  136  --    < > 137   < > 138   < >  --    POTASSIUM  --   --   --   --  4.5  --  4.1 4.5  --    < > 4.1   < > 3.8   < > 4.7   CR  --  1.58* 1.59*   < > 1.14   < > 1.24* 1.17  --    < > 1.25*   < > 2.05*   < >  --    A1C  --   --   --   --   --   --   --   --   --   --  8.0*  --   --   --  8.0*    < > = values in this interval not displayed.        Diagnostics  No labs obtained today, reviewed previous labs.   EKG: sinus tachycardia, Left Bundle Branch Block, Changed from previous tracings.     Revised Cardiac Risk Index (RCRI)  The patient has the following serious cardiovascular risks for perioperative complications:   - Cerebrovascular Disease (TIA or CVA) = 1 point   - Diabetes Mellitus (on Insulin) = 1 point     RCRI Interpretation: 2 points: Class III (moderate risk - 6.6% complication rate)     Estimated Functional Capacity: needs evaluation by cardiology.          Signed Electronically by: Mercedes Leiva NP  Copy of this evaluation report is provided to requesting physician.

## 2024-07-10 NOTE — TELEPHONE ENCOUNTER
[facility] Call Center    Phone Message    May a detailed message be left on voicemail: yes     Reason for Call: Other: Patient calling wondering if his appt on July 29th can be virtual.  He is in rehab and doesn't drive anymore.  The appt is for an MRI follow up.  Please call him back to advise.      Action Taken: Message routed to:  Other: [facility] Neurosurgery     Travel Screening: Not Applicable     Date of Service:

## 2024-07-11 ENCOUNTER — TELEPHONE (OUTPATIENT)
Dept: GERIATRICS | Facility: CLINIC | Age: 79
End: 2024-07-11

## 2024-07-11 LAB
AST SERPL W P-5'-P-CCNC: 24 U/L (ref 0–45)
BASOPHILS # BLD AUTO: 0 10E3/UL (ref 0–0.2)
BASOPHILS NFR BLD AUTO: 1 %
CREAT SERPL-MCNC: 1.4 MG/DL (ref 0.67–1.17)
CRP SERPL-MCNC: 9.81 MG/L
EGFRCR SERPLBLD CKD-EPI 2021: 51 ML/MIN/1.73M2
EOSINOPHIL # BLD AUTO: 0.2 10E3/UL (ref 0–0.7)
EOSINOPHIL NFR BLD AUTO: 4 %
ERYTHROCYTE [DISTWIDTH] IN BLOOD BY AUTOMATED COUNT: 18.1 % (ref 10–15)
HCT VFR BLD AUTO: 33.6 % (ref 40–53)
HGB BLD-MCNC: 9.9 G/DL (ref 13.3–17.7)
IMM GRANULOCYTES # BLD: 0 10E3/UL
IMM GRANULOCYTES NFR BLD: 1 %
LYMPHOCYTES # BLD AUTO: 1.2 10E3/UL (ref 0.8–5.3)
LYMPHOCYTES NFR BLD AUTO: 26 %
MCH RBC QN AUTO: 28 PG (ref 26.5–33)
MCHC RBC AUTO-ENTMCNC: 29.5 G/DL (ref 31.5–36.5)
MCV RBC AUTO: 95 FL (ref 78–100)
MONOCYTES # BLD AUTO: 0.6 10E3/UL (ref 0–1.3)
MONOCYTES NFR BLD AUTO: 13 %
NEUTROPHILS # BLD AUTO: 2.6 10E3/UL (ref 1.6–8.3)
NEUTROPHILS NFR BLD AUTO: 55 %
NRBC # BLD AUTO: 0 10E3/UL
NRBC BLD AUTO-RTO: 0 /100
PLATELET # BLD AUTO: 382 10E3/UL (ref 150–450)
RBC # BLD AUTO: 3.53 10E6/UL (ref 4.4–5.9)
VANCOMYCIN SERPL-MCNC: <4 UG/ML
WBC # BLD AUTO: 4.7 10E3/UL (ref 4–11)

## 2024-07-11 PROCEDURE — 86140 C-REACTIVE PROTEIN: CPT | Mod: ORL | Performed by: FAMILY MEDICINE

## 2024-07-11 PROCEDURE — P9604 ONE-WAY ALLOW PRORATED TRIP: HCPCS | Mod: ORL | Performed by: FAMILY MEDICINE

## 2024-07-11 PROCEDURE — 36415 COLL VENOUS BLD VENIPUNCTURE: CPT | Mod: ORL | Performed by: FAMILY MEDICINE

## 2024-07-11 PROCEDURE — 80202 ASSAY OF VANCOMYCIN: CPT | Mod: ORL | Performed by: FAMILY MEDICINE

## 2024-07-11 PROCEDURE — 84450 TRANSFERASE (AST) (SGOT): CPT | Mod: ORL | Performed by: FAMILY MEDICINE

## 2024-07-11 PROCEDURE — 85025 COMPLETE CBC W/AUTO DIFF WBC: CPT | Mod: ORL | Performed by: FAMILY MEDICINE

## 2024-07-11 PROCEDURE — 82565 ASSAY OF CREATININE: CPT | Mod: ORL | Performed by: FAMILY MEDICINE

## 2024-07-11 NOTE — TELEPHONE ENCOUNTER
"[facility] Geriatrics Lab Note     Provider: ZACH Mckenzie  Facility: [facility]  Facility Type:  TCU    Allergies   Allergen Reactions    Ancef [Cefazolin] Rash    Cephalexin Itching and Rash     Allergic reaction required hospitalization 4/2024    Penicillins Anaphylaxis    Sulfa Antibiotics      \"itchy rash, swelling of face and hands\"       Labs Reviewed by provider: Heme 1, creat, CRP, AST, Vanco.       Verbal Order/Direction given by Provider: Update results to ID.  Patient no longer needs a weekly Vanco level as he's no longer receiving IV Vanco.  Continue other weekly labs as ordered.      Provider giving Order:  ZACH Mckenzie    Verbal Order given to: Crystal([phone])    Lance Brewer RN  "

## 2024-07-16 ENCOUNTER — DOCUMENTATION ONLY (OUTPATIENT)
Dept: GERIATRICS | Facility: CLINIC | Age: 79
End: 2024-07-16

## 2024-07-16 ENCOUNTER — TRANSITIONAL CARE UNIT VISIT (OUTPATIENT)
Dept: GERIATRICS | Facility: CLINIC | Age: 79
End: 2024-07-16
Payer: COMMERCIAL

## 2024-07-16 VITALS
HEART RATE: 91 BPM | OXYGEN SATURATION: 90 % | WEIGHT: 191.8 LBS | RESPIRATION RATE: 18 BRPM | DIASTOLIC BLOOD PRESSURE: 63 MMHG | BODY MASS INDEX: 25.98 KG/M2 | SYSTOLIC BLOOD PRESSURE: 132 MMHG | TEMPERATURE: 97.9 F | HEIGHT: 72 IN

## 2024-07-16 DIAGNOSIS — R53.81 PHYSICAL DECONDITIONING: ICD-10-CM

## 2024-07-16 DIAGNOSIS — I50.22 CHRONIC SYSTOLIC CONGESTIVE HEART FAILURE (H): Primary | ICD-10-CM

## 2024-07-16 DIAGNOSIS — G89.29 CHRONIC LOW BACK PAIN WITHOUT SCIATICA, UNSPECIFIED BACK PAIN LATERALITY: ICD-10-CM

## 2024-07-16 DIAGNOSIS — M54.50 CHRONIC LOW BACK PAIN WITHOUT SCIATICA, UNSPECIFIED BACK PAIN LATERALITY: ICD-10-CM

## 2024-07-16 PROCEDURE — 99309 SBSQ NF CARE MODERATE MDM 30: CPT | Performed by: NURSE PRACTITIONER

## 2024-07-16 RX ORDER — METHOCARBAMOL 500 MG/1
500 TABLET, FILM COATED ORAL EVERY 8 HOURS PRN
COMMUNITY

## 2024-07-16 RX ORDER — GABAPENTIN 300 MG/1
300 CAPSULE ORAL 3 TIMES DAILY
COMMUNITY

## 2024-07-16 RX ORDER — LOPERAMIDE HYDROCHLORIDE 2 MG/1
2 TABLET ORAL 4 TIMES DAILY PRN
COMMUNITY

## 2024-07-16 RX ORDER — TRETINOIN 0.5 MG/G
CREAM TOPICAL EVERY 8 HOURS PRN
COMMUNITY
End: 2024-08-05

## 2024-07-16 NOTE — PROGRESS NOTES
Formerly [facility], [facility] GERIATRIC SERVICES  Chief Complaint   Patient presents with    CHARLEE     [place] Medical Record Number:  [id number]  Place of Service where encounter took place:  [facility] ([facility]) [[id number]]  Code Status:  unknown     HISTORY:      HPI:  Lance Ibrahim  is 79 year old (1945) undergoing physical and occupational therapy.  He is with history of heart failure with preserved ejection fraction, hypertension, dyslipidemia, CKD, gout, BPH, type 2 diabetes and chronic low back pain for which he is folllowed by [facility] and has an implantable spine stimulator ([employer], MRI conditional 4/2024) in place.  Excerpted from records He initially presented to [facility] with acute on chronic, intractable pain with RLE radiculopathy.  For CT concerning for discitis-osteomyelitis at L4-5 with epidural/intraforaminal phlegmon vs abscess at L4-S1 with severe foraminal narrowing. Transferred to [facility] for Neurosurgery evaluation and ID consultation.      Hospitalization on 5/16-5/22/2024 due to severe sepsis related to UTI caused by Klebsiella oxytoca.     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  Acute on chronic back pain with RLE radiculopathy.  Severe spinal stenosis.  --Presented with acute on chronic back pain located on the right side of his back.  Significant tenderness to palpation at approximately the L3-L4 level on the right side with associated RLE radiculopathy. No bladder or bowel incontinence, no saddle anesthesia. Uses a walker for shorter distances and wheelchair for longer distances at baseline. No recent trauma or falls, but did get a trigger point injection by [facility] on 6/14.   *Afebrile since admission, hemodynamically stable.  WBC WNL. CRP 12.4.   *CT lumbar spine as above (review formal report for complete details)   *MRI lumbar spine Discitis osteomyelitis at L4-5. Epidural abscess  in the ventral epidural space, craniocaudal extension of 5 cm (from inferior L4 through inferior S1 level). At L4-5, severe spinal stenosis secondary to the epidural abscess. At L5-S1, moderate spinal stenosis secondary to the epidural abscess.  --Status post IR guided L4-L5 disc aspiration and L4 biopsy. 1 ml of rust colored cloudy fluid aspirated from L4-5 disc space. Large core specimen also obtained from L4 interior endplate  --Started on IV vancomycin on 6/19 post IR guided aspiration.    Blood cultures no growth to date.  Intraoperative biopsy, aspirate - no growth to date.  Noted allergies to penicillin, cephalosporins and sulfa.   --Infectious disease followed - Anticipate at least 6 weeks of IV antibiotics.   --Neurosurgery signed off, no surgical intervention.  Continue intravenous vancomycin (6/19).  PICC line placed 6/23.    Added ertapenem [6/24] for gram-negative coverage given recent Klebsiella bacteremia.  Vancomycin until 7/31, Ertapenem until 8/5    plan to repeat MRI in 6 weeks.     Bacteriuria with cultures negative  Recent severe sepsis in the setting of Klebsiella UTI:   Hospitalization on 5/16/2024-5/22/2024 due to severe sepsis related to UTI caused by Klebsiella oxytoca. Patient was on IV Vanco, Levaquin, Cefepime, and Ceftriaxone in the hospital. He was discharged on oral Cefdinir and Levaquin. No current sx of UTI.   -UA with trace blood in urine, few bacteria otherwise unremarkable.   Urine cultures with less than 10,000 mixed fred.  CT A/P on admission negative for renal calculi or hydronephrosis, note left renal cyst.   --On ertapenem as above.    Today he is seen to review VS, Labs, routine visit.  He was alert and oriented x 4.  Recent chest x-ray with no acute changes.   Per his report he was supposed to have a uvulectomy done back when he was 9 years old.  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  He sleeps with 2-3 pillows.  He denied chest  pain shortness of breath cough congestion constipation.  He reports diarrhea but not watery and requesting Imodium,His pain is controlled with current medications.  He is with a spinal cord stimulator right lower back in which she reports he charges weekly.  He will continue on IV antibiotics.   A1c 2/20/2024 8.0 he will continue on weekly labs and daily weights for CHF however he denies this diagnosis.  Weights reviewed he is up 2.4 pounds over the last week.  . His left knee surgery was postponed until infection is clear.  His  IV Vancomycin was discontinued and he continues on Ertapenum.  Last hemoglobin 9.9    ALLERGIES:Ancef [cefazolin], Cephalexin, Penicillins, and Sulfa antibiotics    PAST MEDICAL HISTORY:   Past Medical History:   Diagnosis Date    Anemia     Arthritis     osteoarthritis knees    BPH     CAD (coronary artery disease)     subtotal occlusion in the small distal LAD     Cardiomyopathy     Cerebral artery occlusion with cerebral infarction     TIA 1993, no residual    Cervicalgia     CHF (congestive heart failure) (H)     Chronic kidney disease     Chronic low back pain     Chronic rhinitis     Gouty arthropathy     hands    Hyperlipidemia     Hypertension     Kidney stone     Nocturia     Obesity     Osteomyelitis (H) 08/2023    Foot    PVD (peripheral vascular disease)     Sleep apnea     doesn't use cpap    TIA (transient ischaemic attack) 1993    Type 2 diabetes mellitus - 11/08/2018    Ureteral stone        PAST SURGICAL HISTORY:   has a past surgical history that includes Diastasis recti repair (1985); Ventral hernia repair NOS (1987); ORIF Shoulder (Left); Colonoscopy (03/09/2013); hernia repair (07/13/2004); Foot surgery (04/2013); Amputate toe(s) (Left, 10/15/2018); Arthroplasty knee (Right, 12/07/2018); Heart Catheterization with Possible Intervention (Left, 03/05/2019); Extracapsular cataract extration with intraocular lens implant (Left, 03/13/2017); Extracapsular cataract extration  with intraocular lens implant (Right); Amputate foot (Right, 08/07/2023); Esophagoscopy, gastroscopy, duodenoscopy (EGD), combined (N/A, 04/30/2024); Endoscopic retrograde cholangiopancreatogram (N/A, 04/30/2024); Spinal Cord Stimulator Implant (04/03/2024); Prostate surgery (02/2024); and IR Disc Aspriation Injection (6/19/2024).    FAMILY HISTORY: family history includes Alcohol/Drug in his paternal grandfather; Cancer in his father; Diabetes in his maternal grandfather; Family History Negative in his mother.    SOCIAL HISTORY:  reports that he quit smoking about 31 years ago. His smoking use included cigarettes. He has never been exposed to tobacco smoke. He has never used smokeless tobacco. He reports that he does not currently use alcohol. He reports that he does not use drugs.    ROS:  Constitutional: Negative for activity change, appetite change, fatigue and fever.   HENT: Negative for congestion.    Respiratory: Negative for cough, shortness of breath and wheezing.    Cardiovascular: Negative for chest pain and leg swelling.   Gastrointestinal: Negative for abdominal distention, abdominal pain, constipation, diarrhea and nausea.   Genitourinary: Negative for dysuria.   Musculoskeletal: positive  for arthralgia. Positive  for back pain.   Skin: Negative for color change and wound. PICC LINE    Neurological: Negative for dizziness.   Psychiatric/Behavioral: Negative for agitation, behavioral problems and confusion.     Physical Exam:  Constitutional:       Appearance: Patient is well-developed.   HENT:      Head: Normocephalic.   Eyes:      Conjunctiva/sclera: Conjunctivae normal.   Neck:      Musculoskeletal: Normal range of motion.   Cardiovascular:      Rate and Rhythm: Normal rate and regular rhythm.      Heart sounds: Normal heart sounds. No murmur.   Pulmonary:      Effort: No respiratory distress.      Breath sounds: Normal breath sounds.   no wheezing or rales.   Abdominal:      General: Bowel sounds  are normal. There is no distension.      Palpations: Abdomen is soft.      Tenderness: There is no abdominal tenderness.   Musculoskeletal:       Normal range of motion.  decreased ROM LUE and LLE   Skin:General:        Skin is warm.   Neurological:         Mental Status: Patient is alert and oriented to person, place, and time.   Psychiatric:         Behavior: Behavior normal.     Vitals:/63   Pulse 91   Temp 97.9  F (36.6  C)   Resp 18   Ht 1.829 m (6')   Wt 87 kg (191 lb 12.8 oz)   SpO2 90%   BMI 26.01 kg/m   and Body mass index is 26.01 kg/m .    Lab/Diagnostic data:   Recent Results (from the past 240 hour(s))   CBC with platelets and differential    Collection Time: 07/09/24  5:49 AM   Result Value Ref Range    WBC Count 5.4 4.0 - 11.0 10e3/uL    RBC Count 3.28 (L) 4.40 - 5.90 10e6/uL    Hemoglobin 9.2 (L) 13.3 - 17.7 g/dL    Hematocrit 31.7 (L) 40.0 - 53.0 %    MCV 97 78 - 100 fL    MCH 28.0 26.5 - 33.0 pg    MCHC 29.0 (L) 31.5 - 36.5 g/dL    RDW 18.4 (H) 10.0 - 15.0 %    Platelet Count 373 150 - 450 10e3/uL    % Neutrophils 50 %    % Lymphocytes 31 %    % Monocytes 13 %    % Eosinophils 4 %    % Basophils 1 %    % Immature Granulocytes 1 %    NRBCs per 100 WBC 0 <1 /100    Absolute Neutrophils 2.7 1.6 - 8.3 10e3/uL    Absolute Lymphocytes 1.7 0.8 - 5.3 10e3/uL    Absolute Monocytes 0.7 0.0 - 1.3 10e3/uL    Absolute Eosinophils 0.2 0.0 - 0.7 10e3/uL    Absolute Basophils 0.1 0.0 - 0.2 10e3/uL    Absolute Immature Granulocytes 0.0 <=0.4 10e3/uL    Absolute NRBCs 0.0 10e3/uL   EKG 12-lead, tracing only    Collection Time: 07/10/24 10:33 AM   Result Value Ref Range    Systolic Blood Pressure  mmHg    Diastolic Blood Pressure  mmHg    Ventricular Rate 103 BPM    Atrial Rate 103 BPM    UT Interval 184 ms    QRS Duration 106 ms     ms    QTc 463 ms    P Axis 92 degrees    R AXIS -22 degrees    T Axis 96 degrees    Interpretation ECG       Sinus tachycardia  Incomplete left bundle branch  block  Left ventricular hypertrophy with repolarization abnormality  Abnormal ECG  When compared with ECG of 17-MAY-2024 17:00,  No significant change was found  Confirmed by PARKER RANDALL MD LOC:[facility] ([id number]) on 7/10/2024 1:47:56 PM     CRP inflammation    Collection Time: 07/11/24  7:02 AM   Result Value Ref Range    CRP Inflammation 9.81 (H) <5.00 mg/L   Creatinine    Collection Time: 07/11/24  7:02 AM   Result Value Ref Range    Creatinine 1.40 (H) 0.67 - 1.17 mg/dL    GFR Estimate 51 (L) >60 mL/min/1.73m2   Vancomycin level    Collection Time: 07/11/24  7:02 AM   Result Value Ref Range    Vancomycin <4.0   ug/mL   AST    Collection Time: 07/11/24  7:02 AM   Result Value Ref Range    AST 24 0 - 45 U/L   CBC with platelets and differential    Collection Time: 07/11/24  7:02 AM   Result Value Ref Range    WBC Count 4.7 4.0 - 11.0 10e3/uL    RBC Count 3.53 (L) 4.40 - 5.90 10e6/uL    Hemoglobin 9.9 (L) 13.3 - 17.7 g/dL    Hematocrit 33.6 (L) 40.0 - 53.0 %    MCV 95 78 - 100 fL    MCH 28.0 26.5 - 33.0 pg    MCHC 29.5 (L) 31.5 - 36.5 g/dL    RDW 18.1 (H) 10.0 - 15.0 %    Platelet Count 382 150 - 450 10e3/uL    % Neutrophils 55 %    % Lymphocytes 26 %    % Monocytes 13 %    % Eosinophils 4 %    % Basophils 1 %    % Immature Granulocytes 1 %    NRBCs per 100 WBC 0 <1 /100    Absolute Neutrophils 2.6 1.6 - 8.3 10e3/uL    Absolute Lymphocytes 1.2 0.8 - 5.3 10e3/uL    Absolute Monocytes 0.6 0.0 - 1.3 10e3/uL    Absolute Eosinophils 0.2 0.0 - 0.7 10e3/uL    Absolute Basophils 0.0 0.0 - 0.2 10e3/uL    Absolute Immature Granulocytes 0.0 <=0.4 10e3/uL    Absolute NRBCs 0.0 10e3/uL        MEDICATIONS:  MED REC REQUIRED  Post Medication Reconciliation Status: discharge medications reconciled, continue medications without change          Review of your medicines            Accurate as of July 16, 2024 12:44 PM. If you have any questions, ask your nurse or doctor.                CONTINUE these medicines which have NOT CHANGED         Dose / Directions   acetaminophen 325 MG tablet  Commonly known as: TYLENOL  Used for: Acute right-sided low back pain with right-sided sciatica      Dose: 650 mg  Take 2 tablets (650 mg) by mouth every 6 hours as needed for pain (and adjunct with moderate or severe pain or per patient request)  Refills: 0     aspirin 81 MG EC tablet  Used for: Coronary artery disease involving native coronary artery of native heart without angina pectoris      Dose: 81 mg  Take 1 tablet (81 mg) by mouth daily  Refills: 0     colchicine 0.6 MG tablet  Commonly known as: COLCRYS  Used for: Gouty arthropathy      TAKE 1 TABLET DAILY  Quantity: 90 tablet  Refills: 3     diclofenac 1 % topical gel  Commonly known as: VOLTAREN      Dose: 4 g  Apply 4 g topically 4 times daily as needed for moderate pain Left knee  Refills: 0     ertapenem 1 GM vial  Commonly known as: INVanz  Indication: Infection of Bone and Bone Marrow  Used for: Lumbar discitis      Dose: 1 g  Inject 1 g into the vein every 24 hours for 42 doses  Refills: 0     famotidine 20 MG tablet  Commonly known as: PEPCID      Dose: 20 mg  Take 1 tablet (20 mg) by mouth 2 times daily as needed (itching)  Quantity: 30 tablet  Refills: 0     ferrous gluconate 324 (38 Fe) MG tablet  Commonly known as: FERGON  Used for: Iron deficiency anemia, unspecified iron deficiency anemia type      Dose: 324 mg  Take 1 tablet (324 mg) by mouth daily  Refills: 0     fluticasone 50 MCG/ACT nasal spray  Commonly known as: FLONASE      Dose: 1-2 spray  Spray 1-2 sprays in nostril daily as needed  Refills: 0     gabapentin 100 MG capsule  Commonly known as: NEURONTIN      Dose: 100 mg  Take 100 mg by mouth 3 times daily  Refills: 0     glipiZIDE 2.5 MG 24 hr tablet  Commonly known as: GLUCOTROL XL      Dose: 2.5 mg  Take 2.5 mg by mouth daily  Refills: 0     insulin glargine 100 UNIT/ML pen  Commonly known as: LANTUS PEN  Used for: Diabetic ulcer of toe of right foot associated with diabetes  mellitus due to underlying condition, with necrosis of bone (H), Type 2 diabetes mellitus with other skin complication, without long-term current use of insulin (H)      Dose: 8 Units  Inject 8 Units Subcutaneous 2 times daily Hold if Blood sugar less than 100.  Refills: 0     lactobacillus rhamnosus (GG) capsule      Dose: 2 capsule  Take 2 capsules by mouth daily Noon  Refills: 0     Lidocaine 4 % Patch  Commonly known as: LIDOCARE      Dose: 1 patch  Place 1 patch onto the skin at bedtime To prevent lidocaine toxicity, patient should be patch free for 12 hrs daily.  Refills: 0     lisinopril 10 MG tablet  Commonly known as: ZESTRIL  Used for: Chronic diastolic heart failure (H)      Dose: 10 mg  Take 1 tablet (10 mg) by mouth every 24 hours  Quantity: 90 tablet  Refills: 3     loperamide 2 MG tablet  Commonly known as: IMODIUM A-D      Dose: 2 mg  Take 2 mg by mouth 4 times daily as needed for diarrhea  Refills: 0     melatonin 5 MG Caps      Dose: 5 mg  Take 5 mg by mouth at bedtime  Refills: 0     methocarbamol 500 MG tablet  Commonly known as: ROBAXIN      Dose: 500 mg  Take 500 mg by mouth every 8 hours as needed for muscle spasms  Refills: 0     nortriptyline 10 MG capsule  Commonly known as: PAMELOR      Dose: 20 mg  Take 20 mg by mouth nightly as needed  Refills: 0     oxyCODONE 5 MG tablet  Commonly known as: ROXICODONE  Used for: Acute right-sided low back pain with right-sided sciatica      Dose: 2.5-5 mg  Take 0.5-1 tablets (2.5-5 mg) by mouth every 6 hours as needed for moderate to severe pain (2.5 mg for moderate pain, 5 mg for severe pain.)  Quantity: 10 tablet  Refills: 0     polyethylene glycol 17 GM/Dose powder  Commonly known as: MIRALAX  Used for: Chronic low back pain without sciatica, unspecified back pain laterality      Dose: 17 g  Take 17 g by mouth daily as needed for constipation  Quantity: 225 g  Refills: 0     polyethylene glycol-propylene glycol 0.4-0.3 % Soln ophthalmic  solution  Commonly known as: SYSTANE ULTRA      Dose: 1 drop  1 drop daily  Refills: 0     Semaglutide 14 MG tablet  Commonly known as: RYBELSUS  Used for: Type 2 diabetes, HbA1c goal < 7% (H)      Dose: 14 mg  Take 14 mg by mouth daily  Quantity: 30 tablet  Refills: 3     sodium bicarbonate 650 MG tablet  Used for: Acidosis      Dose: 1,950 mg  Take 3 tablets (1,950 mg) by mouth 3 times daily  Quantity: 180 tablet  Refills: 1     sodium chloride 0.9 % infusion      Dose: 10 mL  Inject 10 mLs into the vein continuously 24 hours before and after antibiotics  Refills: 0     torsemide 20 MG tablet  Commonly known as: DEMADEX  Used for: Essential hypertension      Dose: 20 mg  TAKE 1 TABLET DAILY  Quantity: 90 tablet  Refills: 3     tretinoin (emollient) 0.05 % Crea cream      Externally apply topically every 8 hours as needed  Refills: 0     Tums 500 MG chewable tablet  Generic drug: calcium carbonate      Dose: 1,000 mg  Take 1,000 mg by mouth 3 times daily as needed for heartburn  Refills: 0     ursodiol 300 MG capsule  Commonly known as: ACTIGALL  Used for: Biliary stricture (H28)      Dose: 300 mg  Take 1 capsule (300 mg) by mouth 2 times daily  Quantity: 60 capsule  Refills: 0              ASSESSMENT/PLAN  Encounter Diagnoses   Name Primary?    Chronic systolic congestive heart failure (H) Yes    Chronic low back pain without sciatica, unspecified back pain laterality     Physical deconditioning      Diarrhea on Imodium 4 times daily as needed    Pain management as needed Tylenol, as needed oxycodone, diclofenac gel, lidocaine patch, methocarbamol,  spinal cord stimulator    Mood disorder on nortriptyline     Physical deconditioning  PT/OT    Gout on colchicine    Bone infection on ertapenem until 8/6/2024, and vancomycin recently discontinued.  follow-up with infectious disease and neurosurgery,  pharmacy to dose antibiotics    Anemia on ferrous gluconate hemoglobin 9.9     Diabetes type 2 continue glipizide XL  2.5 mg daily, glargine 8 units subcu twice daily, semaglutide A1C 6/18/24 was 8.0      Electronically signed by: Darling Valdivia CNP

## 2024-07-16 NOTE — LETTER
7/16/2024      Lance Ibrahim  [address]  [facility] [address]        [facility] GERIATRIC SERVICES  Chief Complaint   [facility]     [facility]     [place] Medical Record Number:  [id number]  Place of Service where encounter took place:  [facility] ([facility]) [[id number]]  Code Status:  unknown     HISTORY:      HPI:  Lance Ibrahim  is 79 year old (1945) undergoing physical and occupational therapy.  He is with history of heart failure with preserved ejection fraction, hypertension, dyslipidemia, CKD, gout, BPH, type 2 diabetes and chronic low back pain for which he is folllowed by [facility] and has an implantable spine stimulator ([employer], MRI conditional 4/2024) in place.  Excerpted from records He initially presented to [facility] with acute on chronic, intractable pain with RLE radiculopathy.  For CT concerning for discitis-osteomyelitis at L4-5 with epidural/intraforaminal phlegmon vs abscess at L4-S1 with severe foraminal narrowing. Transferred to [facility] for Neurosurgery evaluation and ID consultation.      Hospitalization on 5/16-5/22/2024 due to severe sepsis related to UTI caused by Klebsiella oxytoca.     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  Acute on chronic back pain with RLE radiculopathy.  Severe spinal stenosis.  --Presented with acute on chronic back pain located on the right side of his back.  Significant tenderness to palpation at approximately the L3-L4 level on the right side with associated RLE radiculopathy. No bladder or bowel incontinence, no saddle anesthesia. Uses a walker for shorter distances and wheelchair for longer distances at baseline. No recent trauma or falls, but did get a trigger point injection by [facility] on 6/14.   *Afebrile since admission, hemodynamically stable.  WBC WNL. CRP 12.4.   *CT lumbar spine as above (review formal report for complete  details)   *MRI lumbar spine Discitis osteomyelitis at L4-5. 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 At L4-5, severe spinal stenosis secondary to the epidural abscess. At L5-S1, moderate spinal stenosis secondary to the epidural abscess.  --Status post IR guided L4-L5 disc aspiration and L4 biopsy. 1 ml of rust colored cloudy fluid aspirated from L4-5 disc space. Large core specimen also obtained from L4 interior endplate  --Started on IV vancomycin on 6/19 post IR guided aspiration.    Blood cultures no growth to date.  Intraoperative biopsy, aspirate - no growth to date.  Noted allergies to penicillin, cephalosporins and sulfa.   --Infectious disease followed - Anticipate at least 6 weeks of IV antibiotics.   --Neurosurgery signed off, no surgical intervention.  Continue intravenous vancomycin (6/19).  PICC line placed 6/23.    Added ertapenem [6/24] for gram-negative coverage given recent Klebsiella bacteremia.  Vancomycin until 7/31, Ertapenem until 8/5    plan to repeat MRI in 6 weeks.     Bacteriuria with cultures negative  Recent severe sepsis in the setting of Klebsiella UTI:   Hospitalization on 5/16/2024-5/22/2024 due to severe sepsis related to UTI caused by Klebsiella oxytoca. Patient was on IV Vanco, Levaquin, Cefepime, and Ceftriaxone in the hospital. He was discharged on oral Cefdinir and Levaquin. No current sx of UTI.   -UA with trace blood in urine, few bacteria otherwise unremarkable.   Urine cultures with less than 10,000 mixed fred.  CT A/P on admission negative for renal calculi or hydronephrosis, note left renal cyst.   --On ertapenem as above.    Today he is seen to review VS, Labs, routine visit.  He was alert and oriented x 4.  Recent chest x-ray with no acute changes.   Per his report he was supposed to have a uvulectomy done back when he was 9 years old.  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  He sleeps with 2-3 pillows.  He denied chest pain shortness of breath cough congestion constipation.  He reports diarrhea but not watery and requesting Imodium,His pain is controlled with current medications.  He is with a spinal cord stimulator right lower back in which she reports he charges weekly.  He will continue on IV antibiotics.   A1c 2/20/2024 8.0 he will continue on weekly labs and daily weights for CHF however he denies this diagnosis.  Weights reviewed he is up 2.4 pounds over the last week.  . His left knee surgery was postponed until infection is clear.  His  IV Vancomycin was discontinued and he continues on Ertapenum.  Last hemoglobin 9.9    ALLERGIES:Ancef [cefazolin], Cephalexin, Penicillins, and Sulfa antibiotics    PAST MEDICAL HISTORY:   Past Medical History:   Diagnosis Date     Anemia      Arthritis     osteoarthritis knees     BPH      CAD (coronary artery disease)     subtotal occlusion in the small distal LAD      Cardiomyopathy      Cerebral artery occlusion with cerebral infarction     TIA 1993, no residual     Cervicalgia      CHF (congestive heart failure) (H)      Chronic kidney disease      Chronic low back pain      Chronic rhinitis      Gouty arthropathy     hands     Hyperlipidemia      Hypertension      Kidney stone      Nocturia      Obesity      Osteomyelitis (H) 08/2023    Foot     PVD (peripheral vascular disease)      Sleep apnea     doesn't use cpap     TIA (transient ischaemic attack) 1993     Type 2 diabetes mellitus - 11/08/2018     Ureteral stone        PAST SURGICAL HISTORY:   has a past surgical history that includes Diastasis recti repair (1985); Ventral hernia repair NOS (1987); ORIF Shoulder (Left); Colonoscopy (03/09/2013); hernia repair (07/13/2004); Foot surgery (04/2013); Amputate toe(s) (Left, 10/15/2018); Arthroplasty knee (Right, 12/07/2018); Heart Catheterization with Possible Intervention (Left, 03/05/2019); Extracapsular  cataract extration with intraocular lens implant (Left, 03/13/2017); Extracapsular cataract extration with intraocular lens implant (Right); Amputate foot (Right, 08/07/2023); Esophagoscopy, gastroscopy, duodenoscopy (EGD), combined (N/A, 04/30/2024); Endoscopic retrograde cholangiopancreatogram (N/A, 04/30/2024); Spinal Cord Stimulator Implant (04/03/2024); Prostate surgery (02/2024); and IR Disc Aspriation Injection (6/19/2024).    FAMILY HISTORY: family history includes Alcohol/Drug in his paternal grandfather; Cancer in his father; Diabetes in his maternal grandfather; Family History Negative in his mother.    SOCIAL HISTORY:  reports that he quit smoking about 31 years ago. His smoking use included cigarettes. He has never been exposed to tobacco smoke. He has never used smokeless tobacco. He reports that he does not currently use alcohol. He reports that he does not use drugs.    ROS:  Constitutional: Negative for activity change, appetite change, fatigue and fever.   HENT: Negative for congestion.    Respiratory: Negative for cough, shortness of breath and wheezing.    Cardiovascular: Negative for chest pain and leg swelling.   Gastrointestinal: Negative for abdominal distention, abdominal pain, constipation, diarrhea and nausea.   Genitourinary: Negative for dysuria.   Musculoskeletal: positive  for arthralgia. Positive  for back pain.   Skin: Negative for color change and wound. PICC LINE    Neurological: Negative for dizziness.   Psychiatric/Behavioral: Negative for agitation, behavioral problems and confusion.     Physical Exam:  Constitutional:       Appearance: Patient is well-developed.   HENT:      Head: Normocephalic.   Eyes:      Conjunctiva/sclera: Conjunctivae normal.   Neck:      Musculoskeletal: Normal range of motion.   Cardiovascular:      Rate and Rhythm: Normal rate and regular rhythm.      Heart sounds: Normal heart sounds. No murmur.   Pulmonary:      Effort: No respiratory distress.       Breath sounds: Normal breath sounds.   no wheezing or rales.   Abdominal:      General: Bowel sounds are normal. There is no distension.      Palpations: Abdomen is soft.      Tenderness: There is no abdominal tenderness.   Musculoskeletal:       Normal range of motion.  decreased ROM LUE and LLE   Skin:General:        Skin is warm.   Neurological:         Mental Status: Patient is alert and oriented to person, place, and time.   Psychiatric:         Behavior: Behavior normal.     Vitals:/63   Pulse 91   Temp 97.9  F (36.6  C)   Resp 18   Ht 1.829 m (6')   Wt 87 kg (191 lb 12.8 oz)   SpO2 90%   BMI 26.01 kg/m   and Body mass index is 26.01 kg/m .    Lab/Diagnostic data:   Recent Results (from the past 240 hour(s))   CBC with platelets and differential    Collection Time: 07/09/24  5:49 AM   Result Value Ref Range    WBC Count 5.4 4.0 - 11.0 10e3/uL    RBC Count 3.28 (L) 4.40 - 5.90 10e6/uL    Hemoglobin 9.2 (L) 13.3 - 17.7 g/dL    Hematocrit 31.7 (L) 40.0 - 53.0 %    MCV 97 78 - 100 fL    MCH 28.0 26.5 - 33.0 pg    MCHC 29.0 (L) 31.5 - 36.5 g/dL    RDW 18.4 (H) 10.0 - 15.0 %    Platelet Count 373 150 - 450 10e3/uL    % Neutrophils 50 %    % Lymphocytes 31 %    % Monocytes 13 %    % Eosinophils 4 %    % Basophils 1 %    % Immature Granulocytes 1 %    NRBCs per 100 WBC 0 <1 /100    Absolute Neutrophils 2.7 1.6 - 8.3 10e3/uL    Absolute Lymphocytes 1.7 0.8 - 5.3 10e3/uL    Absolute Monocytes 0.7 0.0 - 1.3 10e3/uL    Absolute Eosinophils 0.2 0.0 - 0.7 10e3/uL    Absolute Basophils 0.1 0.0 - 0.2 10e3/uL    Absolute Immature Granulocytes 0.0 <=0.4 10e3/uL    Absolute NRBCs 0.0 10e3/uL   EKG 12-lead, tracing only    Collection Time: 07/10/24 10:33 AM   Result Value Ref Range    Systolic Blood Pressure  mmHg    Diastolic Blood Pressure  mmHg    Ventricular Rate 103 BPM    Atrial Rate 103 BPM    NM Interval 184 ms    QRS Duration 106 ms     ms    QTc 463 ms    P Axis 92 degrees    R AXIS -22 degrees    T  Axis 96 degrees    Interpretation ECG       Sinus tachycardia  Incomplete left bundle branch block  Left ventricular hypertrophy with repolarization abnormality  Abnormal ECG  When compared with ECG of 17-MAY-2024 17:00,  No significant change was found  Confirmed by PARKER RANDALL MD LOC:[facility] ([id number]) on 7/10/2024 1:47:56 PM     CRP inflammation    Collection Time: 07/11/24  7:02 AM   Result Value Ref Range    CRP Inflammation 9.81 (H) <5.00 mg/L   Creatinine    Collection Time: 07/11/24  7:02 AM   Result Value Ref Range    Creatinine 1.40 (H) 0.67 - 1.17 mg/dL    GFR Estimate 51 (L) >60 mL/min/1.73m2   Vancomycin level    Collection Time: 07/11/24  7:02 AM   Result Value Ref Range    Vancomycin <4.0   ug/mL   AST    Collection Time: 07/11/24  7:02 AM   Result Value Ref Range    AST 24 0 - 45 U/L   CBC with platelets and differential    Collection Time: 07/11/24  7:02 AM   Result Value Ref Range    WBC Count 4.7 4.0 - 11.0 10e3/uL    RBC Count 3.53 (L) 4.40 - 5.90 10e6/uL    Hemoglobin 9.9 (L) 13.3 - 17.7 g/dL    Hematocrit 33.6 (L) 40.0 - 53.0 %    MCV 95 78 - 100 fL    MCH 28.0 26.5 - 33.0 pg    MCHC 29.5 (L) 31.5 - 36.5 g/dL    RDW 18.1 (H) 10.0 - 15.0 %    Platelet Count 382 150 - 450 10e3/uL    % Neutrophils 55 %    % Lymphocytes 26 %    % Monocytes 13 %    % Eosinophils 4 %    % Basophils 1 %    % Immature Granulocytes 1 %    NRBCs per 100 WBC 0 <1 /100    Absolute Neutrophils 2.6 1.6 - 8.3 10e3/uL    Absolute Lymphocytes 1.2 0.8 - 5.3 10e3/uL    Absolute Monocytes 0.6 0.0 - 1.3 10e3/uL    Absolute Eosinophils 0.2 0.0 - 0.7 10e3/uL    Absolute Basophils 0.0 0.0 - 0.2 10e3/uL    Absolute Immature Granulocytes 0.0 <=0.4 10e3/uL    Absolute NRBCs 0.0 10e3/uL        MEDICATIONS:  MED REC REQUIRED  Post Medication Reconciliation Status: discharge medications reconciled, continue medications without change          Review of your medicines            Accurate as of July 16, 2024 12:44 PM. If you have any questions, ask  your nurse or doctor.                CONTINUE these medicines which have NOT CHANGED        Dose / Directions   acetaminophen 325 MG tablet  Commonly known as: TYLENOL  Used for: Acute right-sided low back pain with right-sided sciatica      Dose: 650 mg  Take 2 tablets (650 mg) by mouth every 6 hours as needed for pain (and adjunct with moderate or severe pain or per patient request)  Refills: 0     aspirin 81 MG EC tablet  Used for: Coronary artery disease involving native coronary artery of native heart without angina pectoris      Dose: 81 mg  Take 1 tablet (81 mg) by mouth daily  Refills: 0     colchicine 0.6 MG tablet  Commonly known as: COLCRYS  Used for: Gouty arthropathy      TAKE 1 TABLET DAILY  Quantity: 90 tablet  Refills: 3     diclofenac 1 % topical gel  Commonly known as: VOLTAREN      Dose: 4 g  Apply 4 g topically 4 times daily as needed for moderate pain Left knee  Refills: 0     ertapenem 1 GM vial  Commonly known as: INVanz  Indication: Infection of Bone and Bone Marrow  Used for: Lumbar discitis      Dose: 1 g  Inject 1 g into the vein every 24 hours for 42 doses  Refills: 0     famotidine 20 MG tablet  Commonly known as: PEPCID      Dose: 20 mg  Take 1 tablet (20 mg) by mouth 2 times daily as needed (itching)  Quantity: 30 tablet  Refills: 0     ferrous gluconate 324 (38 Fe) MG tablet  Commonly known as: FERGON  Used for: Iron deficiency anemia, unspecified iron deficiency anemia type      Dose: 324 mg  Take 1 tablet (324 mg) by mouth daily  Refills: 0     fluticasone 50 MCG/ACT nasal spray  Commonly known as: FLONASE      Dose: 1-2 spray  Spray 1-2 sprays in nostril daily as needed  Refills: 0     gabapentin 100 MG capsule  Commonly known as: NEURONTIN      Dose: 100 mg  Take 100 mg by mouth 3 times daily  Refills: 0     glipiZIDE 2.5 MG 24 hr tablet  Commonly known as: GLUCOTROL XL      Dose: 2.5 mg  Take 2.5 mg by mouth daily  Refills: 0     insulin glargine 100 UNIT/ML pen  Commonly known  as: LANTUS PEN  Used for: 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  Refills: 0     lactobacillus rhamnosus (GG) capsule      Dose: 2 capsule  Take 2 capsules by mouth daily Noon  Refills: 0     Lidocaine 4 % Patch  Commonly known as: LIDOCARE      Dose: 1 patch  Place 1 patch onto the skin at bedtime To prevent lidocaine toxicity, patient should be patch free for 12 hrs daily.  Refills: 0     lisinopril 10 MG tablet  Commonly known as: ZESTRIL  Used for: Chronic diastolic heart failure (H)      Dose: 10 mg  Take 1 tablet (10 mg) by mouth every 24 hours  Quantity: 90 tablet  Refills: 3     loperamide 2 MG tablet  Commonly known as: IMODIUM A-D      Dose: 2 mg  Take 2 mg by mouth 4 times daily as needed for diarrhea  Refills: 0     melatonin 5 MG Caps      Dose: 5 mg  Take 5 mg by mouth at bedtime  Refills: 0     methocarbamol 500 MG tablet  Commonly known as: ROBAXIN      Dose: 500 mg  Take 500 mg by mouth every 8 hours as needed for muscle spasms  Refills: 0     nortriptyline 10 MG capsule  Commonly known as: PAMELOR      Dose: 20 mg  Take 20 mg by mouth nightly as needed  Refills: 0     oxyCODONE 5 MG tablet  Commonly known as: ROXICODONE  Used for: Acute right-sided low back pain with right-sided sciatica      Dose: 2.5-5 mg  Take 0.5-1 tablets (2.5-5 mg) by mouth every 6 hours as needed for moderate to severe pain (2.5 mg for moderate pain, 5 mg for severe pain.)  Quantity: 10 tablet  Refills: 0     polyethylene glycol 17 GM/Dose powder  Commonly known as: MIRALAX  Used for: Chronic low back pain without sciatica, unspecified back pain laterality      Dose: 17 g  Take 17 g by mouth daily as needed for constipation  Quantity: 225  g  Refills: 0     polyethylene glycol-propylene glycol 0.4-0.3 % Soln ophthalmic solution  Commonly known as: SYSTANE ULTRA      Dose: 1 drop  1 drop daily  Refills: 0     Semaglutide 14 MG tablet  Commonly known as: RYBELSUS  Used for: Type 2 diabetes, HbA1c goal < 7% (H)      Dose: 14 mg  Take 14 mg by mouth daily  Quantity: 30 tablet  Refills: 3     sodium bicarbonate 650 MG tablet  Used for: Acidosis      Dose: 1,950 mg  Take 3 tablets (1,950 mg) by mouth 3 times daily  Quantity: 180 tablet  Refills: 1     sodium chloride 0.9 % infusion      Dose: 10 mL  Inject 10 mLs into the vein continuously 24 hours before and after antibiotics  Refills: 0     torsemide 20 MG tablet  Commonly known as: DEMADEX  Used for: Essential hypertension      Dose: 20 mg  TAKE 1 TABLET DAILY  Quantity: 90 tablet  Refills: 3     tretinoin (emollient) 0.05 % Crea cream      Externally apply topically every 8 hours as needed  Refills: 0     Tums 500 MG chewable tablet  Generic drug: calcium carbonate      Dose: 1,000 mg  Take 1,000 mg by mouth 3 times daily as needed for heartburn  Refills: 0     ursodiol 300 MG capsule  Commonly known as: ACTIGALL  Used for: Biliary stricture (H28)      Dose: 300 mg  Take 1 capsule (300 mg) by mouth 2 times daily  Quantity: 60 capsule  Refills: 0              ASSESSMENT/PLAN  Encounter Diagnoses   Name Primary?     Chronic systolic congestive heart failure (H) Yes     Chronic low back pain without sciatica, unspecified back pain laterality      Physical deconditioning      Diarrhea on Imodium 4 times daily as needed    Pain management as needed Tylenol, as needed oxycodone, diclofenac gel, lidocaine patch, methocarbamol,  spinal cord stimulator    Mood disorder on nortriptyline     Physical deconditioning  PT/OT    Gout on colchicine    Bone infection on ertapenem until 8/6/2024, and vancomycin recently discontinued.  follow-up with infectious disease and neurosurgery,  pharmacy to dose  antibiotics    Anemia on ferrous gluconate hemoglobin 9.9     Diabetes type 2 continue glipizide XL 2.5 mg daily, glargine 8 units subcu twice daily, semaglutide A1C 6/18/24 was 8.0      Electronically signed by: Darling Valdivia CNP       Sincerely,        Darling Valdivia CNP

## 2024-07-17 ENCOUNTER — LAB REQUISITION (OUTPATIENT)
Dept: LAB | Facility: CLINIC | Age: 79
End: 2024-07-17
Payer: COMMERCIAL

## 2024-07-17 DIAGNOSIS — M46.26 OSTEOMYELITIS OF VERTEBRA, LUMBAR REGION (H): ICD-10-CM

## 2024-07-17 DIAGNOSIS — G06.2 EXTRADURAL AND SUBDURAL ABSCESS, UNSPECIFIED: ICD-10-CM

## 2024-07-18 ENCOUNTER — TRANSITIONAL CARE UNIT VISIT (OUTPATIENT)
Dept: GERIATRICS | Facility: CLINIC | Age: 79
End: 2024-07-18
Payer: COMMERCIAL

## 2024-07-18 VITALS
TEMPERATURE: 97.2 F | HEART RATE: 84 BPM | DIASTOLIC BLOOD PRESSURE: 72 MMHG | RESPIRATION RATE: 18 BRPM | SYSTOLIC BLOOD PRESSURE: 123 MMHG | HEIGHT: 72 IN | OXYGEN SATURATION: 93 % | BODY MASS INDEX: 25.87 KG/M2 | WEIGHT: 191 LBS

## 2024-07-18 DIAGNOSIS — Z51.81 ENCOUNTER FOR THERAPEUTIC DRUG MONITORING: Primary | ICD-10-CM

## 2024-07-18 LAB
AST SERPL W P-5'-P-CCNC: 26 U/L (ref 0–45)
BASOPHILS # BLD AUTO: 0.1 10E3/UL (ref 0–0.2)
BASOPHILS NFR BLD AUTO: 1 %
CREAT SERPL-MCNC: 1.27 MG/DL (ref 0.67–1.17)
CRP SERPL-MCNC: 10.1 MG/L
EGFRCR SERPLBLD CKD-EPI 2021: 57 ML/MIN/1.73M2
EOSINOPHIL # BLD AUTO: 0.2 10E3/UL (ref 0–0.7)
EOSINOPHIL NFR BLD AUTO: 3 %
ERYTHROCYTE [DISTWIDTH] IN BLOOD BY AUTOMATED COUNT: 17.2 % (ref 10–15)
HCT VFR BLD AUTO: 38.4 % (ref 40–53)
HGB BLD-MCNC: 11.2 G/DL (ref 13.3–17.7)
IMM GRANULOCYTES # BLD: 0 10E3/UL
IMM GRANULOCYTES NFR BLD: 1 %
LYMPHOCYTES # BLD AUTO: 1.4 10E3/UL (ref 0.8–5.3)
LYMPHOCYTES NFR BLD AUTO: 21 %
MCH RBC QN AUTO: 28 PG (ref 26.5–33)
MCHC RBC AUTO-ENTMCNC: 29.2 G/DL (ref 31.5–36.5)
MCV RBC AUTO: 96 FL (ref 78–100)
MONOCYTES # BLD AUTO: 0.6 10E3/UL (ref 0–1.3)
MONOCYTES NFR BLD AUTO: 9 %
NEUTROPHILS # BLD AUTO: 4.5 10E3/UL (ref 1.6–8.3)
NEUTROPHILS NFR BLD AUTO: 65 %
NRBC # BLD AUTO: 0 10E3/UL
NRBC BLD AUTO-RTO: 0 /100
PLATELET # BLD AUTO: 390 10E3/UL (ref 150–450)
RBC # BLD AUTO: 4 10E6/UL (ref 4.4–5.9)
VANCOMYCIN SERPL-MCNC: <4 UG/ML
WBC # BLD AUTO: 6.9 10E3/UL (ref 4–11)

## 2024-07-18 PROCEDURE — 84450 TRANSFERASE (AST) (SGOT): CPT | Mod: ORL | Performed by: FAMILY MEDICINE

## 2024-07-18 PROCEDURE — 85025 COMPLETE CBC W/AUTO DIFF WBC: CPT | Mod: ORL | Performed by: FAMILY MEDICINE

## 2024-07-18 PROCEDURE — 80202 ASSAY OF VANCOMYCIN: CPT | Mod: ORL | Performed by: FAMILY MEDICINE

## 2024-07-18 PROCEDURE — 82565 ASSAY OF CREATININE: CPT | Mod: ORL | Performed by: FAMILY MEDICINE

## 2024-07-18 PROCEDURE — 86140 C-REACTIVE PROTEIN: CPT | Mod: ORL | Performed by: FAMILY MEDICINE

## 2024-07-18 PROCEDURE — 99308 SBSQ NF CARE LOW MDM 20: CPT | Performed by: NURSE PRACTITIONER

## 2024-07-18 PROCEDURE — 36415 COLL VENOUS BLD VENIPUNCTURE: CPT | Mod: ORL | Performed by: FAMILY MEDICINE

## 2024-07-18 PROCEDURE — P9604 ONE-WAY ALLOW PRORATED TRIP: HCPCS | Mod: ORL | Performed by: FAMILY MEDICINE

## 2024-07-18 NOTE — PROGRESS NOTES
[facility] GERIATRIC SERVICES  Chief Complaint   Patient presents with    CHARLEE     [place] Medical Record Number:  [id number]  Place of Service where encounter took place:  [facility] ([facility]) [[id number]]  Code Status:  unknown     HISTORY:      HPI:  Lance Ibrahim  is 79 year old (1945) undergoing physical and occupational therapy.  He is with history of heart failure with preserved ejection fraction, hypertension, dyslipidemia, CKD, gout, BPH, type 2 diabetes and chronic low back pain for which he is folllowed by [facility] and has an implantable spine stimulator ([employer], MRI conditional 4/2024) in place.  Excerpted from records He initially presented to [facility] with acute on chronic, intractable pain with RLE radiculopathy.  For CT concerning for discitis-osteomyelitis at L4-5 with epidural/intraforaminal phlegmon vs abscess at L4-S1 with severe foraminal narrowing. Transferred to [facility] for Neurosurgery evaluation and ID consultation.      Hospitalization on 5/16-5/22/2024 due to severe sepsis related to UTI caused by Klebsiella oxytoca.     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  Acute on chronic back pain with RLE radiculopathy.  Severe spinal stenosis.  --Presented with acute on chronic back pain located on the right side of his back.  Significant tenderness to palpation at approximately the L3-L4 level on the right side with associated RLE radiculopathy. No bladder or bowel incontinence, no saddle anesthesia. Uses a walker for shorter distances and wheelchair for longer distances at baseline. No recent trauma or falls, but did get a trigger point injection by [facility] on 6/14.   *Afebrile since admission, hemodynamically stable.  WBC WNL. CRP 12.4.   *CT lumbar spine as above (review formal report for complete details)   *MRI lumbar spine Discitis osteomyelitis at L4-5. Epidural abscess  "in the ventral epidural space, craniocaudal extension of 5 cm (from inferior L4 through inferior S1 level). At L4-5, severe spinal stenosis secondary to the epidural abscess. At L5-S1, moderate spinal stenosis secondary to the epidural abscess.  --Status post IR guided L4-L5 disc aspiration and L4 biopsy. 1 ml of rust colored cloudy fluid aspirated from L4-5 disc space. Large core specimen also obtained from L4 interior endplate  --Started on IV vancomycin on 6/19 post IR guided aspiration.    Blood cultures no growth to date.  Intraoperative biopsy, aspirate - no growth to date.  Noted allergies to penicillin, cephalosporins and sulfa.   --Infectious disease followed - Anticipate at least 6 weeks of IV antibiotics.   --Neurosurgery signed off, no surgical intervention.  Continue intravenous vancomycin (6/19).  PICC line placed 6/23.    Added ertapenem [6/24] for gram-negative coverage given recent Klebsiella bacteremia.  Vancomycin until 7/31, Ertapenem until 8/5    plan to repeat MRI in 6 weeks.     Bacteriuria with cultures negative  Recent severe sepsis in the setting of Klebsiella UTI:   Hospitalization on 5/16/2024-5/22/2024 due to severe sepsis related to UTI caused by Klebsiella oxytoca. Patient was on IV Vanco, Levaquin, Cefepime, and Ceftriaxone in the hospital. He was discharged on oral Cefdinir and Levaquin. No current sx of UTI.   -UA with trace blood in urine, few bacteria otherwise unremarkable.   Urine cultures with less than 10,000 mixed fred.  CT A/P on admission negative for renal calculi or hydronephrosis, note left renal cyst.   --On ertapenem as above.    Today he is seen to review VS, and for staff report of refusing his torsemide.  Patient reports refusing his torsemide because it is making him \"pee\" every hour.  Blood pressures reviewed and have been stable weight also stable.  Torsemide was decreased to 10 mg daily and he was okay with this change..  He was alert and oriented x 4.  Recent " chest x-ray with no acute changes. His pain is controlled with current medications.  He is with a spinal cord stimulator right lower back in which she reports he charges weekly.  He will continue on IV antibiotics.   A1c 2/20/2024 8.0 he will continue on weekly labs and daily weights for CHF however he denies this diagnosis. His left knee surgery was postponed until infection is clear.  His  IV Vancomycin was discontinued and he continues on Ertapenum.  Last hemoglobin 9.9 labs pending for today.    ALLERGIES:Ancef [cefazolin], Cephalexin, Penicillins, and Sulfa antibiotics    PAST MEDICAL HISTORY:   Past Medical History:   Diagnosis Date    Anemia     Arthritis     osteoarthritis knees    BPH     CAD (coronary artery disease)     subtotal occlusion in the small distal LAD     Cardiomyopathy     Cerebral artery occlusion with cerebral infarction     TIA 1993, no residual    Cervicalgia     CHF (congestive heart failure) (H)     Chronic kidney disease     Chronic low back pain     Chronic rhinitis     Gouty arthropathy     hands    Hyperlipidemia     Hypertension     Kidney stone     Nocturia     Obesity     Osteomyelitis (H) 08/2023    Foot    PVD (peripheral vascular disease)     Sleep apnea     doesn't use cpap    TIA (transient ischaemic attack) 1993    Type 2 diabetes mellitus - 11/08/2018    Ureteral stone        PAST SURGICAL HISTORY:   has a past surgical history that includes Diastasis recti repair (1985); Ventral hernia repair NOS (1987); ORIF Shoulder (Left); Colonoscopy (03/09/2013); hernia repair (07/13/2004); Foot surgery (04/2013); Amputate toe(s) (Left, 10/15/2018); Arthroplasty knee (Right, 12/07/2018); Heart Catheterization with Possible Intervention (Left, 03/05/2019); Extracapsular cataract extration with intraocular lens implant (Left, 03/13/2017); Extracapsular cataract extration with intraocular lens implant (Right); Amputate foot (Right, 08/07/2023); Esophagoscopy, gastroscopy, duodenoscopy  (EGD), combined (N/A, 04/30/2024); Endoscopic retrograde cholangiopancreatogram (N/A, 04/30/2024); Spinal Cord Stimulator Implant (04/03/2024); Prostate surgery (02/2024); and IR Disc Aspriation Injection (6/19/2024).    FAMILY HISTORY: family history includes Alcohol/Drug in his paternal grandfather; Cancer in his father; Diabetes in his maternal grandfather; Family History Negative in his mother.    SOCIAL HISTORY:  reports that he quit smoking about 31 years ago. His smoking use included cigarettes. He has never been exposed to tobacco smoke. He has never used smokeless tobacco. He reports that he does not currently use alcohol. He reports that he does not use drugs.    ROS:  Constitutional: Negative for activity change, appetite change, fatigue and fever.   HENT: Negative for congestion.    Respiratory: Negative for cough, shortness of breath and wheezing.    Cardiovascular: Negative for chest pain and leg swelling.   Gastrointestinal: Negative for abdominal distention, abdominal pain, constipation, diarrhea and nausea.   Genitourinary: Negative for dysuria.   Musculoskeletal: positive  for arthralgia. Positive  for back pain.   Skin: Negative for color change and wound. PICC LINE    Neurological: Negative for dizziness.   Psychiatric/Behavioral: Negative for agitation, behavioral problems and confusion.     Physical Exam:  Constitutional:       Appearance: Patient is well-developed.   HENT:      Head: Normocephalic.   Eyes:      Conjunctiva/sclera: Conjunctivae normal.   Neck:      Musculoskeletal: Normal range of motion.   Cardiovascular:      Rate and Rhythm: Normal rate and regular rhythm.      Heart sounds: Normal heart sounds. No murmur.   Pulmonary:      Effort: No respiratory distress.      Breath sounds: Normal breath sounds.   no wheezing or rales.   Abdominal:      General: Bowel sounds are normal. There is no distension.      Palpations: Abdomen is soft.      Tenderness: There is no abdominal  tenderness.   Musculoskeletal:       Normal range of motion.  decreased ROM LUE and LLE   Skin:General:        Skin is warm.   Neurological:         Mental Status: Patient is alert and oriented to person, place, and time.   Psychiatric:         Behavior: Behavior normal.     Vitals:/72   Pulse 84   Temp 97.2  F (36.2  C)   Resp 18   Ht 1.829 m (6')   Wt 86.6 kg (191 lb)   SpO2 93%   BMI 25.90 kg/m   and Body mass index is 25.9 kg/m .    Lab/Diagnostic data:   Recent Results (from the past 240 hour(s))   CBC with platelets and differential    Collection Time: 07/09/24  5:49 AM   Result Value Ref Range    WBC Count 5.4 4.0 - 11.0 10e3/uL    RBC Count 3.28 (L) 4.40 - 5.90 10e6/uL    Hemoglobin 9.2 (L) 13.3 - 17.7 g/dL    Hematocrit 31.7 (L) 40.0 - 53.0 %    MCV 97 78 - 100 fL    MCH 28.0 26.5 - 33.0 pg    MCHC 29.0 (L) 31.5 - 36.5 g/dL    RDW 18.4 (H) 10.0 - 15.0 %    Platelet Count 373 150 - 450 10e3/uL    % Neutrophils 50 %    % Lymphocytes 31 %    % Monocytes 13 %    % Eosinophils 4 %    % Basophils 1 %    % Immature Granulocytes 1 %    NRBCs per 100 WBC 0 <1 /100    Absolute Neutrophils 2.7 1.6 - 8.3 10e3/uL    Absolute Lymphocytes 1.7 0.8 - 5.3 10e3/uL    Absolute Monocytes 0.7 0.0 - 1.3 10e3/uL    Absolute Eosinophils 0.2 0.0 - 0.7 10e3/uL    Absolute Basophils 0.1 0.0 - 0.2 10e3/uL    Absolute Immature Granulocytes 0.0 <=0.4 10e3/uL    Absolute NRBCs 0.0 10e3/uL   EKG 12-lead, tracing only    Collection Time: 07/10/24 10:33 AM   Result Value Ref Range    Systolic Blood Pressure  mmHg    Diastolic Blood Pressure  mmHg    Ventricular Rate 103 BPM    Atrial Rate 103 BPM    ID Interval 184 ms    QRS Duration 106 ms     ms    QTc 463 ms    P Axis 92 degrees    R AXIS -22 degrees    T Axis 96 degrees    Interpretation ECG       Sinus tachycardia  Incomplete left bundle branch block  Left ventricular hypertrophy with repolarization abnormality  Abnormal ECG  When compared with ECG of 17-MAY-2024  17:00,  No significant change was found  Confirmed by PARKER RANDALL MD LOC:[facility] ([id number]) on 7/10/2024 1:47:56 PM     CRP inflammation    Collection Time: 07/11/24  7:02 AM   Result Value Ref Range    CRP Inflammation 9.81 (H) <5.00 mg/L   Creatinine    Collection Time: 07/11/24  7:02 AM   Result Value Ref Range    Creatinine 1.40 (H) 0.67 - 1.17 mg/dL    GFR Estimate 51 (L) >60 mL/min/1.73m2   Vancomycin level    Collection Time: 07/11/24  7:02 AM   Result Value Ref Range    Vancomycin <4.0   ug/mL   AST    Collection Time: 07/11/24  7:02 AM   Result Value Ref Range    AST 24 0 - 45 U/L   CBC with platelets and differential    Collection Time: 07/11/24  7:02 AM   Result Value Ref Range    WBC Count 4.7 4.0 - 11.0 10e3/uL    RBC Count 3.53 (L) 4.40 - 5.90 10e6/uL    Hemoglobin 9.9 (L) 13.3 - 17.7 g/dL    Hematocrit 33.6 (L) 40.0 - 53.0 %    MCV 95 78 - 100 fL    MCH 28.0 26.5 - 33.0 pg    MCHC 29.5 (L) 31.5 - 36.5 g/dL    RDW 18.1 (H) 10.0 - 15.0 %    Platelet Count 382 150 - 450 10e3/uL    % Neutrophils 55 %    % Lymphocytes 26 %    % Monocytes 13 %    % Eosinophils 4 %    % Basophils 1 %    % Immature Granulocytes 1 %    NRBCs per 100 WBC 0 <1 /100    Absolute Neutrophils 2.6 1.6 - 8.3 10e3/uL    Absolute Lymphocytes 1.2 0.8 - 5.3 10e3/uL    Absolute Monocytes 0.6 0.0 - 1.3 10e3/uL    Absolute Eosinophils 0.2 0.0 - 0.7 10e3/uL    Absolute Basophils 0.0 0.0 - 0.2 10e3/uL    Absolute Immature Granulocytes 0.0 <=0.4 10e3/uL    Absolute NRBCs 0.0 10e3/uL        MEDICATIONS:  MED REC REQUIRED  Post Medication Reconciliation Status: discharge medications reconciled, continue medications without change          Review of your medicines            Accurate as of July 18, 2024  9:17 AM. If you have any questions, ask your nurse or doctor.                CONTINUE these medicines which may have CHANGED, or have new prescriptions. If we are uncertain of the size of tablets/capsules you have at home, strength may be listed as  something that might have changed.        Dose / Directions   torsemide 20 MG tablet  Commonly known as: DEMADEX  This may have changed: how much to take  Used for: Essential hypertension      Dose: 20 mg  TAKE 1 TABLET DAILY  Quantity: 90 tablet  Refills: 3            CONTINUE these medicines which have NOT CHANGED        Dose / Directions   acetaminophen 325 MG tablet  Commonly known as: TYLENOL  Used for: Acute right-sided low back pain with right-sided sciatica      Dose: 650 mg  Take 2 tablets (650 mg) by mouth every 6 hours as needed for pain (and adjunct with moderate or severe pain or per patient request)  Refills: 0     aspirin 81 MG EC tablet  Used for: Coronary artery disease involving native coronary artery of native heart without angina pectoris      Dose: 81 mg  Take 1 tablet (81 mg) by mouth daily  Refills: 0     colchicine 0.6 MG tablet  Commonly known as: COLCRYS  Used for: Gouty arthropathy      TAKE 1 TABLET DAILY  Quantity: 90 tablet  Refills: 3     diclofenac 1 % topical gel  Commonly known as: VOLTAREN      Dose: 4 g  Apply 4 g topically 4 times daily as needed for moderate pain Left knee  Refills: 0     ertapenem 1 GM vial  Commonly known as: INVanz  Indication: Infection of Bone and Bone Marrow  Used for: Lumbar discitis      Dose: 1 g  Inject 1 g into the vein every 24 hours for 42 doses  Refills: 0     famotidine 20 MG tablet  Commonly known as: PEPCID      Dose: 20 mg  Take 1 tablet (20 mg) by mouth 2 times daily as needed (itching)  Quantity: 30 tablet  Refills: 0     ferrous gluconate 324 (38 Fe) MG tablet  Commonly known as: FERGON  Used for: Iron deficiency anemia, unspecified iron deficiency anemia type      Dose: 324 mg  Take 1 tablet (324 mg) by mouth daily  Refills: 0     fluticasone 50 MCG/ACT nasal spray  Commonly known as: FLONASE      Dose: 1-2 spray  Spray 1-2 sprays in nostril daily as needed  Refills: 0     gabapentin 100 MG capsule  Commonly known as: NEURONTIN      Dose:  100 mg  Take 100 mg by mouth 3 times daily  Refills: 0     glipiZIDE 2.5 MG 24 hr tablet  Commonly known as: GLUCOTROL XL      Dose: 2.5 mg  Take 2.5 mg by mouth daily  Refills: 0     insulin glargine 100 UNIT/ML pen  Commonly known as: LANTUS PEN  Used for: 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  Refills: 0     lactobacillus rhamnosus (GG) capsule      Dose: 2 capsule  Take 2 capsules by mouth daily Noon  Refills: 0     Lidocaine 4 % Patch  Commonly known as: LIDOCARE      Dose: 1 patch  Place 1 patch onto the skin at bedtime To prevent lidocaine toxicity, patient should be patch free for 12 hrs daily.  Refills: 0     lisinopril 10 MG tablet  Commonly known as: ZESTRIL  Used for: Chronic diastolic heart failure (H)      Dose: 10 mg  Take 1 tablet (10 mg) by mouth every 24 hours  Quantity: 90 tablet  Refills: 3     loperamide 2 MG tablet  Commonly known as: IMODIUM A-D      Dose: 2 mg  Take 2 mg by mouth 4 times daily as needed for diarrhea  Refills: 0     melatonin 5 MG Caps      Dose: 5 mg  Take 5 mg by mouth at bedtime  Refills: 0     methocarbamol 500 MG tablet  Commonly known as: ROBAXIN      Dose: 500 mg  Take 500 mg by mouth every 8 hours as needed for muscle spasms  Refills: 0     nortriptyline 10 MG capsule  Commonly known as: PAMELOR      Dose: 20 mg  Take 20 mg by mouth nightly as needed  Refills: 0     oxyCODONE 5 MG tablet  Commonly known as: ROXICODONE  Used for: Acute right-sided low back pain with right-sided sciatica      Dose: 2.5-5 mg  Take 0.5-1 tablets (2.5-5 mg) by mouth every 6 hours as needed for moderate to severe pain (2.5 mg for moderate pain, 5 mg for severe pain.)  Quantity: 10 tablet  Refills: 0      polyethylene glycol 17 GM/Dose powder  Commonly known as: MIRALAX  Used for: Chronic low back pain without sciatica, unspecified back pain laterality      Dose: 17 g  Take 17 g by mouth daily as needed for constipation  Quantity: 225 g  Refills: 0     polyethylene glycol-propylene glycol 0.4-0.3 % Soln ophthalmic solution  Commonly known as: SYSTANE ULTRA      Dose: 1 drop  1 drop daily  Refills: 0     Semaglutide 14 MG tablet  Commonly known as: RYBELSUS  Used for: Type 2 diabetes, HbA1c goal < 7% (H)      Dose: 14 mg  Take 14 mg by mouth daily  Quantity: 30 tablet  Refills: 3     sodium bicarbonate 650 MG tablet  Used for: Acidosis      Dose: 1,950 mg  Take 3 tablets (1,950 mg) by mouth 3 times daily  Quantity: 180 tablet  Refills: 1     sodium chloride 0.9 % infusion      Dose: 10 mL  Inject 10 mLs into the vein continuously 24 hours before and after antibiotics  Refills: 0     tretinoin (emollient) 0.05 % Crea cream      Externally apply topically every 8 hours as needed  Refills: 0     Tums 500 MG chewable tablet  Generic drug: calcium carbonate      Dose: 1,000 mg  Take 1,000 mg by mouth 3 times daily as needed for heartburn  Refills: 0     ursodiol 300 MG capsule  Commonly known as: ACTIGALL  Used for: Biliary stricture (H28)      Dose: 300 mg  Take 1 capsule (300 mg) by mouth 2 times daily  Quantity: 60 capsule  Refills: 0              ASSESSMENT/PLAN  Encounter Diagnosis   Name Primary?    Encounter for therapeutic drug monitoring Yes     Diarrhea on Imodium 4 times daily as needed    Pain management as needed Tylenol, as needed oxycodone, diclofenac gel, lidocaine patch, methocarbamol,  spinal cord stimulator    Mood disorder on nortriptyline     Physical deconditioning  PT/OT    Gout on colchicine    Bone infection on ertapenem until 8/6/2024, and vancomycin recently discontinued.  follow-up with infectious disease and neurosurgery,  pharmacy to dose antibiotics    Anemia on ferrous gluconate hemoglobin  9.9     Diabetes type 2 continue glipizide XL 2.5 mg daily, glargine 8 units subcu twice daily, semaglutide A1C 6/18/24 was 8.0    Hypertension on torsemide, decreased 7/18/24 to 10 mg po daily , continue lisinopril    Electronically signed by: Darling Valdivia CNP

## 2024-07-18 NOTE — LETTER
7/18/2024      Lance Ibrahim  [address]  [facility] [address]        Formerly [facility] GERIATRIC SERVICES  Chief Complaint   [facility]     [facility]     [place] [facility] Record Number:  [id number]  Place of Service where encounter took place:  [facility] ([facility]) [[id number]]  Code Status:  unknown     HISTORY:      HPI:  Lance Ibrahim  is 79 year old (1945) undergoing physical and occupational therapy.  He is with history of heart failure with preserved ejection fraction, hypertension, dyslipidemia, CKD, gout, BPH, type 2 diabetes and chronic low back pain for which he is folllowed by [facility] and has an implantable spine stimulator ([employer], MRI conditional 4/2024) in place.  Excerpted from records He initially presented to [facility] with acute on chronic, intractable pain with RLE radiculopathy.  For CT concerning for discitis-osteomyelitis at L4-5 with epidural/intraforaminal phlegmon vs abscess at L4-S1 with severe foraminal narrowing. Transferred to [facility] for Neurosurgery evaluation and ID consultation.      Hospitalization on 5/16-5/22/2024 due to severe sepsis related to UTI caused by Klebsiella oxytoca.     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  Acute on chronic back pain with RLE radiculopathy.  Severe spinal stenosis.  --Presented with acute on chronic back pain located on the right side of his back.  Significant tenderness to palpation at approximately the L3-L4 level on the right side with associated RLE radiculopathy. No bladder or bowel incontinence, no saddle anesthesia. Uses a walker for shorter distances and wheelchair for longer distances at baseline. No recent trauma or falls, but did get a trigger point injection by [facility] on 6/14.   *Afebrile since admission, hemodynamically stable.  WBC WNL. CRP 12.4.   *CT lumbar spine as above (review formal report for complete  "details)   *MRI lumbar spine Discitis osteomyelitis at L4-5. 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 At L4-5, severe spinal stenosis secondary to the epidural abscess. At L5-S1, moderate spinal stenosis secondary to the epidural abscess.  --Status post IR guided L4-L5 disc aspiration and L4 biopsy. 1 ml of rust colored cloudy fluid aspirated from L4-5 disc space. Large core specimen also obtained from L4 interior endplate  --Started on IV vancomycin on 6/19 post IR guided aspiration.    Blood cultures no growth to date.  Intraoperative biopsy, aspirate - no growth to date.  Noted allergies to penicillin, cephalosporins and sulfa.   --Infectious disease followed - Anticipate at least 6 weeks of IV antibiotics.   --Neurosurgery signed off, no surgical intervention.  Continue intravenous vancomycin (6/19).  PICC line placed 6/23.    Added ertapenem [6/24] for gram-negative coverage given recent Klebsiella bacteremia.  Vancomycin until 7/31, Ertapenem until 8/5    plan to repeat MRI in 6 weeks.     Bacteriuria with cultures negative  Recent severe sepsis in the setting of Klebsiella UTI:   Hospitalization on 5/16/2024-5/22/2024 due to severe sepsis related to UTI caused by Klebsiella oxytoca. Patient was on IV Vanco, Levaquin, Cefepime, and Ceftriaxone in the hospital. He was discharged on oral Cefdinir and Levaquin. No current sx of UTI.   -UA with trace blood in urine, few bacteria otherwise unremarkable.   Urine cultures with less than 10,000 mixed fred.  CT A/P on admission negative for renal calculi or hydronephrosis, note left renal cyst.   --On ertapenem as above.    Today he is seen to review VS, and for staff report of refusing his torsemide.  Patient reports refusing his torsemide because it is making him \"pee\" every hour.  Blood pressures reviewed and have been stable weight also stable.  Torsemide was decreased to 10 mg " daily and he was okay with this change..  He was alert and oriented x 4.  Recent chest x-ray with no acute changes. His pain is controlled with current medications.  He is with a spinal cord stimulator right lower back in which she reports he charges weekly.  He will continue on IV antibiotics.   A1c 2/20/2024 8.0 he will continue on weekly labs and daily weights for CHF however he denies this diagnosis. His left knee surgery was postponed until infection is clear.  His  IV Vancomycin was discontinued and he continues on Ertapenum.  Last hemoglobin 9.9 labs pending for today.    ALLERGIES:Ancef [cefazolin], Cephalexin, Penicillins, and Sulfa antibiotics    PAST MEDICAL HISTORY:   Past Medical History:   Diagnosis Date     Anemia      Arthritis     osteoarthritis knees     BPH      CAD (coronary artery disease)     subtotal occlusion in the small distal LAD      Cardiomyopathy      Cerebral artery occlusion with cerebral infarction     TIA 1993, no residual     Cervicalgia      CHF (congestive heart failure) (H)      Chronic kidney disease      Chronic low back pain      Chronic rhinitis      Gouty arthropathy     hands     Hyperlipidemia      Hypertension      Kidney stone      Nocturia      Obesity      Osteomyelitis (H) 08/2023    Foot     PVD (peripheral vascular disease)      Sleep apnea     doesn't use cpap     TIA (transient ischaemic attack) 1993     Type 2 diabetes mellitus - 11/08/2018     Ureteral stone        PAST SURGICAL HISTORY:   has a past surgical history that includes Diastasis recti repair (1985); Ventral hernia repair NOS (1987); ORIF Shoulder (Left); Colonoscopy (03/09/2013); hernia repair (07/13/2004); Foot surgery (04/2013); Amputate toe(s) (Left, 10/15/2018); Arthroplasty knee (Right, 12/07/2018); Heart Catheterization with Possible Intervention (Left, 03/05/2019); Extracapsular cataract extration with intraocular lens implant (Left, 03/13/2017); Extracapsular cataract extration with  intraocular lens implant (Right); Amputate foot (Right, 08/07/2023); Esophagoscopy, gastroscopy, duodenoscopy (EGD), combined (N/A, 04/30/2024); Endoscopic retrograde cholangiopancreatogram (N/A, 04/30/2024); Spinal Cord Stimulator Implant (04/03/2024); Prostate surgery (02/2024); and IR Disc Aspriation Injection (6/19/2024).    FAMILY HISTORY: family history includes Alcohol/Drug in his paternal grandfather; Cancer in his father; Diabetes in his maternal grandfather; Family History Negative in his mother.    SOCIAL HISTORY:  reports that he quit smoking about 31 years ago. His smoking use included cigarettes. He has never been exposed to tobacco smoke. He has never used smokeless tobacco. He reports that he does not currently use alcohol. He reports that he does not use drugs.    ROS:  Constitutional: Negative for activity change, appetite change, fatigue and fever.   HENT: Negative for congestion.    Respiratory: Negative for cough, shortness of breath and wheezing.    Cardiovascular: Negative for chest pain and leg swelling.   Gastrointestinal: Negative for abdominal distention, abdominal pain, constipation, diarrhea and nausea.   Genitourinary: Negative for dysuria.   Musculoskeletal: positive  for arthralgia. Positive  for back pain.   Skin: Negative for color change and wound. PICC LINE    Neurological: Negative for dizziness.   Psychiatric/Behavioral: Negative for agitation, behavioral problems and confusion.     Physical Exam:  Constitutional:       Appearance: Patient is well-developed.   HENT:      Head: Normocephalic.   Eyes:      Conjunctiva/sclera: Conjunctivae normal.   Neck:      Musculoskeletal: Normal range of motion.   Cardiovascular:      Rate and Rhythm: Normal rate and regular rhythm.      Heart sounds: Normal heart sounds. No murmur.   Pulmonary:      Effort: No respiratory distress.      Breath sounds: Normal breath sounds.   no wheezing or rales.   Abdominal:      General: Bowel sounds are  normal. There is no distension.      Palpations: Abdomen is soft.      Tenderness: There is no abdominal tenderness.   Musculoskeletal:       Normal range of motion.  decreased ROM LUE and LLE   Skin:General:        Skin is warm.   Neurological:         Mental Status: Patient is alert and oriented to person, place, and time.   Psychiatric:         Behavior: Behavior normal.     Vitals:/72   Pulse 84   Temp 97.2  F (36.2  C)   Resp 18   Ht 1.829 m (6')   Wt 86.6 kg (191 lb)   SpO2 93%   BMI 25.90 kg/m   and Body mass index is 25.9 kg/m .    Lab/Diagnostic data:   Recent Results (from the past 240 hour(s))   CBC with platelets and differential    Collection Time: 07/09/24  5:49 AM   Result Value Ref Range    WBC Count 5.4 4.0 - 11.0 10e3/uL    RBC Count 3.28 (L) 4.40 - 5.90 10e6/uL    Hemoglobin 9.2 (L) 13.3 - 17.7 g/dL    Hematocrit 31.7 (L) 40.0 - 53.0 %    MCV 97 78 - 100 fL    MCH 28.0 26.5 - 33.0 pg    MCHC 29.0 (L) 31.5 - 36.5 g/dL    RDW 18.4 (H) 10.0 - 15.0 %    Platelet Count 373 150 - 450 10e3/uL    % Neutrophils 50 %    % Lymphocytes 31 %    % Monocytes 13 %    % Eosinophils 4 %    % Basophils 1 %    % Immature Granulocytes 1 %    NRBCs per 100 WBC 0 <1 /100    Absolute Neutrophils 2.7 1.6 - 8.3 10e3/uL    Absolute Lymphocytes 1.7 0.8 - 5.3 10e3/uL    Absolute Monocytes 0.7 0.0 - 1.3 10e3/uL    Absolute Eosinophils 0.2 0.0 - 0.7 10e3/uL    Absolute Basophils 0.1 0.0 - 0.2 10e3/uL    Absolute Immature Granulocytes 0.0 <=0.4 10e3/uL    Absolute NRBCs 0.0 10e3/uL   EKG 12-lead, tracing only    Collection Time: 07/10/24 10:33 AM   Result Value Ref Range    Systolic Blood Pressure  mmHg    Diastolic Blood Pressure  mmHg    Ventricular Rate 103 BPM    Atrial Rate 103 BPM    DE Interval 184 ms    QRS Duration 106 ms     ms    QTc 463 ms    P Axis 92 degrees    R AXIS -22 degrees    T Axis 96 degrees    Interpretation ECG       Sinus tachycardia  Incomplete left bundle branch block  Left  ventricular hypertrophy with repolarization abnormality  Abnormal ECG  When compared with ECG of 17-MAY-2024 17:00,  No significant change was found  Confirmed by PARKER RANDALL MD LOC:[facility] ([id number]) on 7/10/2024 1:47:56 PM     CRP inflammation    Collection Time: 07/11/24  7:02 AM   Result Value Ref Range    CRP Inflammation 9.81 (H) <5.00 mg/L   Creatinine    Collection Time: 07/11/24  7:02 AM   Result Value Ref Range    Creatinine 1.40 (H) 0.67 - 1.17 mg/dL    GFR Estimate 51 (L) >60 mL/min/1.73m2   Vancomycin level    Collection Time: 07/11/24  7:02 AM   Result Value Ref Range    Vancomycin <4.0   ug/mL   AST    Collection Time: 07/11/24  7:02 AM   Result Value Ref Range    AST 24 0 - 45 U/L   CBC with platelets and differential    Collection Time: 07/11/24  7:02 AM   Result Value Ref Range    WBC Count 4.7 4.0 - 11.0 10e3/uL    RBC Count 3.53 (L) 4.40 - 5.90 10e6/uL    Hemoglobin 9.9 (L) 13.3 - 17.7 g/dL    Hematocrit 33.6 (L) 40.0 - 53.0 %    MCV 95 78 - 100 fL    MCH 28.0 26.5 - 33.0 pg    MCHC 29.5 (L) 31.5 - 36.5 g/dL    RDW 18.1 (H) 10.0 - 15.0 %    Platelet Count 382 150 - 450 10e3/uL    % Neutrophils 55 %    % Lymphocytes 26 %    % Monocytes 13 %    % Eosinophils 4 %    % Basophils 1 %    % Immature Granulocytes 1 %    NRBCs per 100 WBC 0 <1 /100    Absolute Neutrophils 2.6 1.6 - 8.3 10e3/uL    Absolute Lymphocytes 1.2 0.8 - 5.3 10e3/uL    Absolute Monocytes 0.6 0.0 - 1.3 10e3/uL    Absolute Eosinophils 0.2 0.0 - 0.7 10e3/uL    Absolute Basophils 0.0 0.0 - 0.2 10e3/uL    Absolute Immature Granulocytes 0.0 <=0.4 10e3/uL    Absolute NRBCs 0.0 10e3/uL        MEDICATIONS:  MED REC REQUIRED  Post Medication Reconciliation Status: discharge medications reconciled, continue medications without change          Review of your medicines            Accurate as of July 18, 2024  9:17 AM. If you have any questions, ask your nurse or doctor.                CONTINUE these medicines which may have CHANGED, or have new  prescriptions. If we are uncertain of the size of tablets/capsules you have at home, strength may be listed as something that might have changed.        Dose / Directions   torsemide 20 MG tablet  Commonly known as: DEMADEX  This may have changed: how much to take  Used for: Essential hypertension      Dose: 20 mg  TAKE 1 TABLET DAILY  Quantity: 90 tablet  Refills: 3            CONTINUE these medicines which have NOT CHANGED        Dose / Directions   acetaminophen 325 MG tablet  Commonly known as: TYLENOL  Used for: Acute right-sided low back pain with right-sided sciatica      Dose: 650 mg  Take 2 tablets (650 mg) by mouth every 6 hours as needed for pain (and adjunct with moderate or severe pain or per patient request)  Refills: 0     aspirin 81 MG EC tablet  Used for: Coronary artery disease involving native coronary artery of native heart without angina pectoris      Dose: 81 mg  Take 1 tablet (81 mg) by mouth daily  Refills: 0     colchicine 0.6 MG tablet  Commonly known as: COLCRYS  Used for: Gouty arthropathy      TAKE 1 TABLET DAILY  Quantity: 90 tablet  Refills: 3     diclofenac 1 % topical gel  Commonly known as: VOLTAREN      Dose: 4 g  Apply 4 g topically 4 times daily as needed for moderate pain Left knee  Refills: 0     ertapenem 1 GM vial  Commonly known as: INVanz  Indication: Infection of Bone and Bone Marrow  Used for: Lumbar discitis      Dose: 1 g  Inject 1 g into the vein every 24 hours for 42 doses  Refills: 0     famotidine 20 MG tablet  Commonly known as: PEPCID      Dose: 20 mg  Take 1 tablet (20 mg) by mouth 2 times daily as needed (itching)  Quantity: 30 tablet  Refills: 0     ferrous gluconate 324 (38 Fe) MG tablet  Commonly known as: FERGON  Used for: Iron deficiency anemia, unspecified iron deficiency anemia type      Dose: 324 mg  Take 1 tablet (324 mg) by mouth daily  Refills: 0     fluticasone 50 MCG/ACT nasal spray  Commonly known as: FLONASE      Dose: 1-2 spray  Spray 1-2 sprays  in nostril daily as needed  Refills: 0     gabapentin 100 MG capsule  Commonly known as: NEURONTIN      Dose: 100 mg  Take 100 mg by mouth 3 times daily  Refills: 0     glipiZIDE 2.5 MG 24 hr tablet  Commonly known as: GLUCOTROL XL      Dose: 2.5 mg  Take 2.5 mg by mouth daily  Refills: 0     insulin glargine 100 UNIT/ML pen  Commonly known as: LANTUS PEN  Used for: 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  Refills: 0     lactobacillus rhamnosus (GG) capsule      Dose: 2 capsule  Take 2 capsules by mouth daily Noon  Refills: 0     Lidocaine 4 % Patch  Commonly known as: LIDOCARE      Dose: 1 patch  Place 1 patch onto the skin at bedtime To prevent lidocaine toxicity, patient should be patch free for 12 hrs daily.  Refills: 0     lisinopril 10 MG tablet  Commonly known as: ZESTRIL  Used for: Chronic diastolic heart failure (H)      Dose: 10 mg  Take 1 tablet (10 mg) by mouth every 24 hours  Quantity: 90 tablet  Refills: 3     loperamide 2 MG tablet  Commonly known as: IMODIUM A-D      Dose: 2 mg  Take 2 mg by mouth 4 times daily as needed for diarrhea  Refills: 0     melatonin 5 MG Caps      Dose: 5 mg  Take 5 mg by mouth at bedtime  Refills: 0     methocarbamol 500 MG tablet  Commonly known as: ROBAXIN      Dose: 500 mg  Take 500 mg by mouth every 8 hours as needed for muscle spasms  Refills: 0     nortriptyline 10 MG capsule  Commonly known as: PAMELOR      Dose: 20 mg  Take 20 mg by mouth nightly as needed  Refills: 0     oxyCODONE 5 MG tablet  Commonly known as: ROXICODONE  Used for: Acute right-sided low back pain with right-sided sciatica      Dose: 2.5-5 mg  Take 0.5-1 tablets (2.5-5 mg) by mouth every 6 hours as needed for moderate  to severe pain (2.5 mg for moderate pain, 5 mg for severe pain.)  Quantity: 10 tablet  Refills: 0     polyethylene glycol 17 GM/Dose powder  Commonly known as: MIRALAX  Used for: Chronic low back pain without sciatica, unspecified back pain laterality      Dose: 17 g  Take 17 g by mouth daily as needed for constipation  Quantity: 225 g  Refills: 0     polyethylene glycol-propylene glycol 0.4-0.3 % Soln ophthalmic solution  Commonly known as: SYSTANE ULTRA      Dose: 1 drop  1 drop daily  Refills: 0     Semaglutide 14 MG tablet  Commonly known as: RYBELSUS  Used for: Type 2 diabetes, HbA1c goal < 7% (H)      Dose: 14 mg  Take 14 mg by mouth daily  Quantity: 30 tablet  Refills: 3     sodium bicarbonate 650 MG tablet  Used for: Acidosis      Dose: 1,950 mg  Take 3 tablets (1,950 mg) by mouth 3 times daily  Quantity: 180 tablet  Refills: 1     sodium chloride 0.9 % infusion      Dose: 10 mL  Inject 10 mLs into the vein continuously 24 hours before and after antibiotics  Refills: 0     tretinoin (emollient) 0.05 % Crea cream      Externally apply topically every 8 hours as needed  Refills: 0     Tums 500 MG chewable tablet  Generic drug: calcium carbonate      Dose: 1,000 mg  Take 1,000 mg by mouth 3 times daily as needed for heartburn  Refills: 0     ursodiol 300 MG capsule  Commonly known as: ACTIGALL  Used for: Biliary stricture (H28)      Dose: 300 mg  Take 1 capsule (300 mg) by mouth 2 times daily  Quantity: 60 capsule  Refills: 0              ASSESSMENT/PLAN  Encounter Diagnosis   Name Primary?     Encounter for therapeutic drug monitoring Yes     Diarrhea on Imodium 4 times daily as needed    Pain management as needed Tylenol, as needed oxycodone, diclofenac gel, lidocaine patch, methocarbamol,  spinal cord stimulator    Mood disorder on nortriptyline     Physical deconditioning  PT/OT    Gout on colchicine    Bone infection on ertapenem until 8/6/2024, and vancomycin recently discontinued.  follow-up with  infectious disease and neurosurgery,  pharmacy to dose antibiotics    Anemia on ferrous gluconate hemoglobin 9.9     Diabetes type 2 continue glipizide XL 2.5 mg daily, glargine 8 units subcu twice daily, semaglutide A1C 6/18/24 was 8.0    Hypertension on torsemide, decreased 7/18/24 to 10 mg po daily , continue lisinopril    Electronically signed by: Darling Valdivia CNP       Sincerely,        Darling Valdivia CNP

## 2024-07-19 ENCOUNTER — TRANSITIONAL CARE UNIT VISIT (OUTPATIENT)
Dept: GERIATRICS | Facility: CLINIC | Age: 79
End: 2024-07-19
Payer: COMMERCIAL

## 2024-07-19 VITALS
WEIGHT: 191 LBS | HEART RATE: 114 BPM | HEIGHT: 72 IN | SYSTOLIC BLOOD PRESSURE: 134 MMHG | DIASTOLIC BLOOD PRESSURE: 78 MMHG | RESPIRATION RATE: 18 BRPM | BODY MASS INDEX: 25.87 KG/M2 | TEMPERATURE: 97.2 F | OXYGEN SATURATION: 97 %

## 2024-07-19 DIAGNOSIS — R52 PAIN MANAGEMENT: ICD-10-CM

## 2024-07-19 DIAGNOSIS — U07.1 INFECTION DUE TO 2019 NOVEL CORONAVIRUS: Primary | ICD-10-CM

## 2024-07-19 DIAGNOSIS — R53.81 PHYSICAL DECONDITIONING: ICD-10-CM

## 2024-07-19 PROCEDURE — 99310 SBSQ NF CARE HIGH MDM 45: CPT | Performed by: NURSE PRACTITIONER

## 2024-07-19 NOTE — LETTER
7/19/2024      Lance Ibrahim  [address]  [facility] [address]        [facility] GERIATRIC SERVICES  Chief Complaint   [facility]     [facility]     [place] Medical Record Number:  [id number]  Place of Service where encounter took place:  [facility] ([facility]) [[id number]]  Code Status:  unknown     HISTORY:      HPI:  Lance Ibrahim  is 79 year old (1945) undergoing physical and occupational therapy.  He is with history of heart failure with preserved ejection fraction, hypertension, dyslipidemia, CKD, gout, BPH, type 2 diabetes and chronic low back pain for which he is folllowed by [facility] and has an implantable spine stimulator ([employer], MRI conditional 4/2024) in place.  Excerpted from records He initially presented to [facility] with acute on chronic, intractable pain with RLE radiculopathy.  For CT concerning for discitis-osteomyelitis at L4-5 with epidural/intraforaminal phlegmon vs abscess at L4-S1 with severe foraminal narrowing. Transferred to [facility] for Neurosurgery evaluation and ID consultation.      Hospitalization on 5/16-5/22/2024 due to severe sepsis related to UTI caused by Klebsiella oxytoca.     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  Acute on chronic back pain with RLE radiculopathy.  Severe spinal stenosis.  --Presented with acute on chronic back pain located on the right side of his back.  Significant tenderness to palpation at approximately the L3-L4 level on the right side with associated RLE radiculopathy. No bladder or bowel incontinence, no saddle anesthesia. Uses a walker for shorter distances and wheelchair for longer distances at baseline. No recent trauma or falls, but did get a trigger point injection by [facility] on 6/14.   *Afebrile since admission, hemodynamically stable.  WBC WNL. CRP 12.4.   *CT lumbar spine as above (review formal report for complete  details)   *MRI lumbar spine Discitis osteomyelitis at L4-5. 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 At L4-5, severe spinal stenosis secondary to the epidural abscess. At L5-S1, moderate spinal stenosis secondary to the epidural abscess.  --Status post IR guided L4-L5 disc aspiration and L4 biopsy. 1 ml of rust colored cloudy fluid aspirated from L4-5 disc space. Large core specimen also obtained from L4 interior endplate  --Started on IV vancomycin on 6/19 post IR guided aspiration.    Blood cultures no growth to date.  Intraoperative biopsy, aspirate - no growth to date.  Noted allergies to penicillin, cephalosporins and sulfa.   --Infectious disease followed - Anticipate at least 6 weeks of IV antibiotics.   --Neurosurgery signed off, no surgical intervention.  Continue intravenous vancomycin (6/19).  PICC line placed 6/23.    Added ertapenem [6/24] for gram-negative coverage given recent Klebsiella bacteremia.  Vancomycin until 7/31, Ertapenem until 8/5    plan to repeat MRI in 6 weeks.     Bacteriuria with cultures negative  Recent severe sepsis in the setting of Klebsiella UTI:   Hospitalization on 5/16/2024-5/22/2024 due to severe sepsis related to UTI caused by Klebsiella oxytoca. Patient was on IV Vanco, Levaquin, Cefepime, and Ceftriaxone in the hospital. He was discharged on oral Cefdinir and Levaquin. No current sx of UTI.   -UA with trace blood in urine, few bacteria otherwise unremarkable.   Urine cultures with less than 10,000 mixed fred.  CT A/P on admission negative for renal calculi or hydronephrosis, note left renal cyst.   --On ertapenem as above.    Today he is seen to review VS, and for positive COVID.  He was diagnosed with COVID on 7/18/2024.  He was seen in his room.  He is asymptomatic with an occasional dry cough.  Robitussin as needed.  He denies any changes in taste or smell.  Denies shortness of  breath.   He was alert and oriented x 4.  Recent chest x-ray with no acute changes. His pain is controlled with current medications.  He is with a spinal cord stimulator right lower back in which she reports he charges weekly.  He will continue on IV antibiotics.   A1c 2/20/2024 8.0 he will continue on weekly labs and daily weights for CHF however he denies this diagnosis. His left knee surgery was postponed until infection is clear.  His  IV Vancomycin was discontinued and he continues on Ertapenum.  Last hemoglobin 11.2.  Weight reviewed and he is up 2.4 pounds over the last week.     ALLERGIES:Ancef [cefazolin], Cephalexin, Penicillins, and Sulfa antibiotics    PAST MEDICAL HISTORY:   Past Medical History:   Diagnosis Date     Anemia      Arthritis     osteoarthritis knees     BPH      CAD (coronary artery disease)     subtotal occlusion in the small distal LAD      Cardiomyopathy      Cerebral artery occlusion with cerebral infarction     TIA 1993, no residual     Cervicalgia      CHF (congestive heart failure) (H)      Chronic kidney disease      Chronic low back pain      Chronic rhinitis      Gouty arthropathy     hands     Hyperlipidemia      Hypertension      Kidney stone      Nocturia      Obesity      Osteomyelitis (H) 08/2023    Foot     PVD (peripheral vascular disease)      Sleep apnea     doesn't use cpap     TIA (transient ischaemic attack) 1993     Type 2 diabetes mellitus - 11/08/2018     Ureteral stone        PAST SURGICAL HISTORY:   has a past surgical history that includes Diastasis recti repair (1985); Ventral hernia repair NOS (1987); ORIF Shoulder (Left); Colonoscopy (03/09/2013); hernia repair (07/13/2004); Foot surgery (04/2013); Amputate toe(s) (Left, 10/15/2018); Arthroplasty knee (Right, 12/07/2018); Heart Catheterization with Possible Intervention (Left, 03/05/2019); Extracapsular cataract extration with intraocular lens implant (Left, 03/13/2017); Extracapsular cataract extration with  intraocular lens implant (Right); Amputate foot (Right, 08/07/2023); Esophagoscopy, gastroscopy, duodenoscopy (EGD), combined (N/A, 04/30/2024); Endoscopic retrograde cholangiopancreatogram (N/A, 04/30/2024); Spinal Cord Stimulator Implant (04/03/2024); Prostate surgery (02/2024); and IR Disc Aspriation Injection (6/19/2024).    FAMILY HISTORY: family history includes Alcohol/Drug in his paternal grandfather; Cancer in his father; Diabetes in his maternal grandfather; Family History Negative in his mother.    SOCIAL HISTORY:  reports that he quit smoking about 31 years ago. His smoking use included cigarettes. He has never been exposed to tobacco smoke. He has never used smokeless tobacco. He reports that he does not currently use alcohol. He reports that he does not use drugs.    ROS:  Constitutional: Negative for activity change, appetite change, fatigue and fever.   HENT: Negative for congestion.    Respiratory: Negative for cough, shortness of breath and wheezing.    Cardiovascular: Negative for chest pain and leg swelling.   Gastrointestinal: Negative for abdominal distention, abdominal pain, constipation, diarrhea and nausea.   Genitourinary: Negative for dysuria.   Musculoskeletal: positive  for arthralgia. Positive  for back pain.   Skin: Negative for color change and wound. PICC LINE    Neurological: Negative for dizziness.   Psychiatric/Behavioral: Negative for agitation, behavioral problems and confusion.     Physical Exam:  Constitutional:       Appearance: Patient is well-developed.   HENT:      Head: Normocephalic.   Eyes:      Conjunctiva/sclera: Conjunctivae normal.   Neck:      Musculoskeletal: Normal range of motion.   Cardiovascular:      Rate and Rhythm: Normal rate and regular rhythm.      Heart sounds: Normal heart sounds. No murmur.   Pulmonary:      Effort: No respiratory distress.      Breath sounds: Normal breath sounds.   no wheezing or rales.   Abdominal:      General: Bowel sounds are  normal. There is no distension.      Palpations: Abdomen is soft.      Tenderness: There is no abdominal tenderness.   Musculoskeletal:       Normal range of motion.  decreased ROM LUE and LLE   Skin:General:        Skin is warm.   Neurological:         Mental Status: Patient is alert and oriented to person, place, and time.   Psychiatric:         Behavior: Behavior normal.     Vitals:/78   Pulse 114   Temp 97.2  F (36.2  C)   Resp 18   Ht 1.829 m (6')   Wt 86.6 kg (191 lb)   SpO2 97%   BMI 25.90 kg/m   and Body mass index is 25.9 kg/m .    Lab/Diagnostic data:   Recent Results (from the past 240 hour(s))   CRP inflammation    Collection Time: 07/11/24  7:02 AM   Result Value Ref Range    CRP Inflammation 9.81 (H) <5.00 mg/L   Creatinine    Collection Time: 07/11/24  7:02 AM   Result Value Ref Range    Creatinine 1.40 (H) 0.67 - 1.17 mg/dL    GFR Estimate 51 (L) >60 mL/min/1.73m2   Vancomycin level    Collection Time: 07/11/24  7:02 AM   Result Value Ref Range    Vancomycin <4.0   ug/mL   AST    Collection Time: 07/11/24  7:02 AM   Result Value Ref Range    AST 24 0 - 45 U/L   CBC with platelets and differential    Collection Time: 07/11/24  7:02 AM   Result Value Ref Range    WBC Count 4.7 4.0 - 11.0 10e3/uL    RBC Count 3.53 (L) 4.40 - 5.90 10e6/uL    Hemoglobin 9.9 (L) 13.3 - 17.7 g/dL    Hematocrit 33.6 (L) 40.0 - 53.0 %    MCV 95 78 - 100 fL    MCH 28.0 26.5 - 33.0 pg    MCHC 29.5 (L) 31.5 - 36.5 g/dL    RDW 18.1 (H) 10.0 - 15.0 %    Platelet Count 382 150 - 450 10e3/uL    % Neutrophils 55 %    % Lymphocytes 26 %    % Monocytes 13 %    % Eosinophils 4 %    % Basophils 1 %    % Immature Granulocytes 1 %    NRBCs per 100 WBC 0 <1 /100    Absolute Neutrophils 2.6 1.6 - 8.3 10e3/uL    Absolute Lymphocytes 1.2 0.8 - 5.3 10e3/uL    Absolute Monocytes 0.6 0.0 - 1.3 10e3/uL    Absolute Eosinophils 0.2 0.0 - 0.7 10e3/uL    Absolute Basophils 0.0 0.0 - 0.2 10e3/uL    Absolute Immature Granulocytes 0.0 <=0.4  10e3/uL    Absolute NRBCs 0.0 10e3/uL   CRP inflammation    Collection Time: 07/18/24  9:35 AM   Result Value Ref Range    CRP Inflammation 10.10 (H) <5.00 mg/L   Creatinine    Collection Time: 07/18/24  9:35 AM   Result Value Ref Range    Creatinine 1.27 (H) 0.67 - 1.17 mg/dL    GFR Estimate 57 (L) >60 mL/min/1.73m2   Vancomycin level    Collection Time: 07/18/24  9:35 AM   Result Value Ref Range    Vancomycin <4.0   ug/mL   AST    Collection Time: 07/18/24  9:35 AM   Result Value Ref Range    AST 26 0 - 45 U/L   CBC with platelets and differential    Collection Time: 07/18/24  9:35 AM   Result Value Ref Range    WBC Count 6.9 4.0 - 11.0 10e3/uL    RBC Count 4.00 (L) 4.40 - 5.90 10e6/uL    Hemoglobin 11.2 (L) 13.3 - 17.7 g/dL    Hematocrit 38.4 (L) 40.0 - 53.0 %    MCV 96 78 - 100 fL    MCH 28.0 26.5 - 33.0 pg    MCHC 29.2 (L) 31.5 - 36.5 g/dL    RDW 17.2 (H) 10.0 - 15.0 %    Platelet Count 390 150 - 450 10e3/uL    % Neutrophils 65 %    % Lymphocytes 21 %    % Monocytes 9 %    % Eosinophils 3 %    % Basophils 1 %    % Immature Granulocytes 1 %    NRBCs per 100 WBC 0 <1 /100    Absolute Neutrophils 4.5 1.6 - 8.3 10e3/uL    Absolute Lymphocytes 1.4 0.8 - 5.3 10e3/uL    Absolute Monocytes 0.6 0.0 - 1.3 10e3/uL    Absolute Eosinophils 0.2 0.0 - 0.7 10e3/uL    Absolute Basophils 0.1 0.0 - 0.2 10e3/uL    Absolute Immature Granulocytes 0.0 <=0.4 10e3/uL    Absolute NRBCs 0.0 10e3/uL        MEDICATIONS:  MED REC REQUIRED  Post Medication Reconciliation Status: discharge medications reconciled, continue medications without change          Review of your medicines            Accurate as of July 19, 2024 11:59 PM. If you have any questions, ask your nurse or doctor.                CONTINUE these medicines which may have CHANGED, or have new prescriptions. If we are uncertain of the size of tablets/capsules you have at home, strength may be listed as something that might have changed.        Dose / Directions   torsemide 20 MG  tablet  Commonly known as: DEMADEX  This may have changed: how much to take  Used for: Essential hypertension      Dose: 20 mg  TAKE 1 TABLET DAILY  Quantity: 90 tablet  Refills: 3            CONTINUE these medicines which have NOT CHANGED        Dose / Directions   acetaminophen 325 MG tablet  Commonly known as: TYLENOL  Used for: Acute right-sided low back pain with right-sided sciatica      Dose: 650 mg  Take 2 tablets (650 mg) by mouth every 6 hours as needed for pain (and adjunct with moderate or severe pain or per patient request)  Refills: 0     aspirin 81 MG EC tablet  Used for: Coronary artery disease involving native coronary artery of native heart without angina pectoris      Dose: 81 mg  Take 1 tablet (81 mg) by mouth daily  Refills: 0     colchicine 0.6 MG tablet  Commonly known as: COLCRYS  Used for: Gouty arthropathy      TAKE 1 TABLET DAILY  Quantity: 90 tablet  Refills: 3     diclofenac 1 % topical gel  Commonly known as: VOLTAREN      Dose: 4 g  Apply 4 g topically 4 times daily as needed for moderate pain Left knee  Refills: 0     ertapenem 1 GM vial  Commonly known as: INVanz  Indication: Infection of Bone and Bone Marrow  Used for: Lumbar discitis      Dose: 1 g  Inject 1 g into the vein every 24 hours for 42 doses  Refills: 0     famotidine 20 MG tablet  Commonly known as: PEPCID      Dose: 20 mg  Take 1 tablet (20 mg) by mouth 2 times daily as needed (itching)  Quantity: 30 tablet  Refills: 0     ferrous gluconate 324 (38 Fe) MG tablet  Commonly known as: FERGON  Used for: Iron deficiency anemia, unspecified iron deficiency anemia type      Dose: 324 mg  Take 1 tablet (324 mg) by mouth daily  Refills: 0     fluticasone 50 MCG/ACT nasal spray  Commonly known as: FLONASE      Dose: 1-2 spray  Spray 1-2 sprays in nostril daily as needed  Refills: 0     gabapentin 100 MG capsule  Commonly known as: NEURONTIN      Dose: 100 mg  Take 100 mg by mouth 3 times daily  Refills: 0     glipiZIDE 2.5 MG 24  hr tablet  Commonly known as: GLUCOTROL XL      Dose: 2.5 mg  Take 2.5 mg by mouth daily  Refills: 0     guaiFENesin 20 mg/mL liquid  Commonly known as: ROBITUSSIN      Dose: 200 mg  Take 200 mg by mouth every 4 hours as needed for cough  Refills: 0     insulin glargine 100 UNIT/ML pen  Commonly known as: LANTUS PEN  Used for: 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  Refills: 0     lactobacillus rhamnosus (GG) capsule      Dose: 2 capsule  Take 2 capsules by mouth daily Noon  Refills: 0     Lidocaine 4 % Patch  Commonly known as: LIDOCARE      Dose: 1 patch  Place 1 patch onto the skin at bedtime To prevent lidocaine toxicity, patient should be patch free for 12 hrs daily.  Refills: 0     lisinopril 10 MG tablet  Commonly known as: ZESTRIL  Used for: Chronic diastolic heart failure (H)      Dose: 10 mg  Take 1 tablet (10 mg) by mouth every 24 hours  Quantity: 90 tablet  Refills: 3     loperamide 2 MG tablet  Commonly known as: IMODIUM A-D      Dose: 2 mg  Take 2 mg by mouth 4 times daily as needed for diarrhea  Refills: 0     melatonin 5 MG Caps      Dose: 5 mg  Take 5 mg by mouth at bedtime  Refills: 0     methocarbamol 500 MG tablet  Commonly known as: ROBAXIN      Dose: 500 mg  Take 500 mg by mouth every 8 hours as needed for muscle spasms  Refills: 0     nortriptyline 10 MG capsule  Commonly known as: PAMELOR      Dose: 20 mg  Take 20 mg by mouth nightly as needed  Refills: 0     oxyCODONE 5 MG tablet  Commonly known as: ROXICODONE  Used for: Acute right-sided low back pain with right-sided sciatica      Dose: 2.5-5 mg  Take 0.5-1 tablets (2.5-5 mg) by mouth every 6 hours as needed for moderate to severe pain (2.5 mg for moderate  pain, 5 mg for severe pain.)  Quantity: 10 tablet  Refills: 0     polyethylene glycol 17 GM/Dose powder  Commonly known as: MIRALAX  Used for: Chronic low back pain without sciatica, unspecified back pain laterality      Dose: 17 g  Take 17 g by mouth daily as needed for constipation  Quantity: 225 g  Refills: 0     polyethylene glycol-propylene glycol 0.4-0.3 % Soln ophthalmic solution  Commonly known as: SYSTANE ULTRA      Dose: 1 drop  1 drop daily  Refills: 0     Semaglutide 14 MG tablet  Commonly known as: RYBELSUS  Used for: Type 2 diabetes, HbA1c goal < 7% (H)      Dose: 14 mg  Take 14 mg by mouth daily  Quantity: 30 tablet  Refills: 3     senna-docusate 8.6-50 MG tablet  Commonly known as: SENOKOT-S/PERICOLACE      Dose: 1 tablet  Take 1 tablet by mouth 2 times daily as needed for constipation  Refills: 0     sodium bicarbonate 650 MG tablet  Used for: Acidosis      Dose: 1,950 mg  Take 3 tablets (1,950 mg) by mouth 3 times daily  Quantity: 180 tablet  Refills: 1     sodium chloride 0.9 % infusion      Dose: 10 mL  Inject 10 mLs into the vein continuously 24 hours before and after antibiotics  Refills: 0     tretinoin (emollient) 0.05 % Crea cream      Externally apply topically every 8 hours as needed  Refills: 0     Tums 500 MG chewable tablet  Generic drug: calcium carbonate      Dose: 1,000 mg  Take 1,000 mg by mouth 3 times daily as needed for heartburn  Refills: 0     ursodiol 300 MG capsule  Commonly known as: ACTIGALL  Used for: Biliary stricture (H28)      Dose: 300 mg  Take 1 capsule (300 mg) by mouth 2 times daily  Quantity: 60 capsule  Refills: 0              ASSESSMENT/PLAN  Encounter Diagnoses   Name Primary?     Infection due to 2019 novel coronavirus Yes     Physical deconditioning      Pain management      COVID-positive currently asymptomatic with an occasional dry cough.  Denies shortness of breath change in taste or smell will treat symptoms.    Diarrhea on Imodium 4 times daily as  needed    Pain management as needed Tylenol, as needed oxycodone, diclofenac gel, lidocaine patch, methocarbamol,  spinal cord stimulator    Mood disorder on nortriptyline     Physical deconditioning  PT/OT    Gout on colchicine    Bone infection on ertapenem until 8/6/2024, and vancomycin recently discontinued.  follow-up with infectious disease and neurosurgery,  pharmacy to dose antibiotics    Anemia on ferrous gluconate hemoglobin 11.2    Diabetes type 2 continue glipizide XL 2.5 mg daily, glargine 8 units subcu twice daily, semaglutide A1C 6/18/24 was 8.0    Hypertension on torsemide, decreased 7/18/24 to 10 mg po daily per his request., continue lisinopril    Electronically signed by: Darling Valdivia CNP       Sincerely,        Darling Valdivia CNP

## 2024-07-20 RX ORDER — GUAIFENESIN 200 MG/10ML
200 LIQUID ORAL EVERY 4 HOURS PRN
COMMUNITY
End: 2024-08-05

## 2024-07-20 RX ORDER — AMOXICILLIN 250 MG
1 CAPSULE ORAL 2 TIMES DAILY PRN
COMMUNITY
End: 2024-08-05

## 2024-07-20 NOTE — PROGRESS NOTES
[facility] GERIATRIC SERVICES  Chief Complaint   Patient presents with    CHARLEE     [facility] Medical Record Number:  [id number]  Place of Service where encounter took place:  [facility] (formerly [facility]) ([facility]) [[id number]]  Code Status:  unknown     HISTORY:      HPI:  Lance Ibrahim  is 79 year old (1945) undergoing physical and occupational therapy.  He is with history of heart failure with preserved ejection fraction, hypertension, dyslipidemia, CKD, gout, BPH, type 2 diabetes and chronic low back pain for which he is folllowed by [facility] and has an implantable spine stimulator ([employer], MRI conditional 4/2024) in place.  Excerpted from records He initially presented to [facility] with acute on chronic, intractable pain with RLE radiculopathy.  For CT concerning for discitis-osteomyelitis at L4-5 with epidural/intraforaminal phlegmon vs abscess at L4-S1 with severe foraminal narrowing. Transferred to [facility] for Neurosurgery evaluation and ID consultation.      Hospitalization on 5/16-5/22/2024 due to severe sepsis related to UTI caused by Klebsiella oxytoca.     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  Acute on chronic back pain with RLE radiculopathy.  Severe spinal stenosis.  --Presented with acute on chronic back pain located on the right side of his back.  Significant tenderness to palpation at approximately the L3-L4 level on the right side with associated RLE radiculopathy. No bladder or bowel incontinence, no saddle anesthesia. Uses a walker for shorter distances and wheelchair for longer distances at baseline. No recent trauma or falls, but did get a trigger point injection by [facility] on 6/14.   *Afebrile since admission, hemodynamically stable.  WBC WNL. CRP 12.4.   *CT lumbar spine as above (review formal report for complete details)   *MRI lumbar spine Discitis osteomyelitis at L4-5. Epidural abscess  in the ventral epidural space, craniocaudal extension of 5 cm (from inferior L4 through inferior S1 level). At L4-5, severe spinal stenosis secondary to the epidural abscess. At L5-S1, moderate spinal stenosis secondary to the epidural abscess.  --Status post IR guided L4-L5 disc aspiration and L4 biopsy. 1 ml of rust colored cloudy fluid aspirated from L4-5 disc space. Large core specimen also obtained from L4 interior endplate  --Started on IV vancomycin on 6/19 post IR guided aspiration.    Blood cultures no growth to date.  Intraoperative biopsy, aspirate - no growth to date.  Noted allergies to penicillin, cephalosporins and sulfa.   --Infectious disease followed - Anticipate at least 6 weeks of IV antibiotics.   --Neurosurgery signed off, no surgical intervention.  Continue intravenous vancomycin (6/19).  PICC line placed 6/23.    Added ertapenem [6/24] for gram-negative coverage given recent Klebsiella bacteremia.  Vancomycin until 7/31, Ertapenem until 8/5    plan to repeat MRI in 6 weeks.     Bacteriuria with cultures negative  Recent severe sepsis in the setting of Klebsiella UTI:   Hospitalization on 5/16/2024-5/22/2024 due to severe sepsis related to UTI caused by Klebsiella oxytoca. Patient was on IV Vanco, Levaquin, Cefepime, and Ceftriaxone in the hospital. He was discharged on oral Cefdinir and Levaquin. No current sx of UTI.   -UA with trace blood in urine, few bacteria otherwise unremarkable.   Urine cultures with less than 10,000 mixed fred.  CT A/P on admission negative for renal calculi or hydronephrosis, note left renal cyst.   --On ertapenem as above.    Today he is seen to review VS, and for positive COVID.  He was diagnosed with COVID on 7/18/2024.  He was seen in his room.  He is asymptomatic with an occasional dry cough.  Robitussin as needed.  He denies any changes in taste or smell.  Denies shortness of breath.   He was alert and oriented x 4.  Recent chest x-ray with no acute changes.  His pain is controlled with current medications.  He is with a spinal cord stimulator right lower back in which she reports he charges weekly.  He will continue on IV antibiotics.   A1c 2/20/2024 8.0 he will continue on weekly labs and daily weights for CHF however he denies this diagnosis. His left knee surgery was postponed until infection is clear.  His  IV Vancomycin was discontinued and he continues on Ertapenum.  Last hemoglobin 11.2.  Weight reviewed and he is up 2.4 pounds over the last week.     ALLERGIES:Ancef [cefazolin], Cephalexin, Penicillins, and Sulfa antibiotics    PAST MEDICAL HISTORY:   Past Medical History:   Diagnosis Date    Anemia     Arthritis     osteoarthritis knees    BPH     CAD (coronary artery disease)     subtotal occlusion in the small distal LAD     Cardiomyopathy     Cerebral artery occlusion with cerebral infarction     TIA 1993, no residual    Cervicalgia     CHF (congestive heart failure) (H)     Chronic kidney disease     Chronic low back pain     Chronic rhinitis     Gouty arthropathy     hands    Hyperlipidemia     Hypertension     Kidney stone     Nocturia     Obesity     Osteomyelitis (H) 08/2023    Foot    PVD (peripheral vascular disease)     Sleep apnea     doesn't use cpap    TIA (transient ischaemic attack) 1993    Type 2 diabetes mellitus - 11/08/2018    Ureteral stone        PAST SURGICAL HISTORY:   has a past surgical history that includes Diastasis recti repair (1985); Ventral hernia repair NOS (1987); ORIF Shoulder (Left); Colonoscopy (03/09/2013); hernia repair (07/13/2004); Foot surgery (04/2013); Amputate toe(s) (Left, 10/15/2018); Arthroplasty knee (Right, 12/07/2018); Heart Catheterization with Possible Intervention (Left, 03/05/2019); Extracapsular cataract extration with intraocular lens implant (Left, 03/13/2017); Extracapsular cataract extration with intraocular lens implant (Right); Amputate foot (Right, 08/07/2023); Esophagoscopy, gastroscopy,  duodenoscopy (EGD), combined (N/A, 04/30/2024); Endoscopic retrograde cholangiopancreatogram (N/A, 04/30/2024); Spinal Cord Stimulator Implant (04/03/2024); Prostate surgery (02/2024); and IR Disc Aspriation Injection (6/19/2024).    FAMILY HISTORY: family history includes Alcohol/Drug in his paternal grandfather; Cancer in his father; Diabetes in his maternal grandfather; Family History Negative in his mother.    SOCIAL HISTORY:  reports that he quit smoking about 31 years ago. His smoking use included cigarettes. He has never been exposed to tobacco smoke. He has never used smokeless tobacco. He reports that he does not currently use alcohol. He reports that he does not use drugs.    ROS:  Constitutional: Negative for activity change, appetite change, fatigue and fever.   HENT: Negative for congestion.    Respiratory: Negative for cough, shortness of breath and wheezing.    Cardiovascular: Negative for chest pain and leg swelling.   Gastrointestinal: Negative for abdominal distention, abdominal pain, constipation, diarrhea and nausea.   Genitourinary: Negative for dysuria.   Musculoskeletal: positive  for arthralgia. Positive  for back pain.   Skin: Negative for color change and wound. PICC LINE    Neurological: Negative for dizziness.   Psychiatric/Behavioral: Negative for agitation, behavioral problems and confusion.     Physical Exam:  Constitutional:       Appearance: Patient is well-developed.   HENT:      Head: Normocephalic.   Eyes:      Conjunctiva/sclera: Conjunctivae normal.   Neck:      Musculoskeletal: Normal range of motion.   Cardiovascular:      Rate and Rhythm: Normal rate and regular rhythm.      Heart sounds: Normal heart sounds. No murmur.   Pulmonary:      Effort: No respiratory distress.      Breath sounds: Normal breath sounds.   no wheezing or rales.   Abdominal:      General: Bowel sounds are normal. There is no distension.      Palpations: Abdomen is soft.      Tenderness: There is no  abdominal tenderness.   Musculoskeletal:       Normal range of motion.  decreased ROM LUE and LLE   Skin:General:        Skin is warm.   Neurological:         Mental Status: Patient is alert and oriented to person, place, and time.   Psychiatric:         Behavior: Behavior normal.     Vitals:/78   Pulse 114   Temp 97.2  F (36.2  C)   Resp 18   Ht 1.829 m (6')   Wt 86.6 kg (191 lb)   SpO2 97%   BMI 25.90 kg/m   and Body mass index is 25.9 kg/m .    Lab/Diagnostic data:   Recent Results (from the past 240 hour(s))   CRP inflammation    Collection Time: 07/11/24  7:02 AM   Result Value Ref Range    CRP Inflammation 9.81 (H) <5.00 mg/L   Creatinine    Collection Time: 07/11/24  7:02 AM   Result Value Ref Range    Creatinine 1.40 (H) 0.67 - 1.17 mg/dL    GFR Estimate 51 (L) >60 mL/min/1.73m2   Vancomycin level    Collection Time: 07/11/24  7:02 AM   Result Value Ref Range    Vancomycin <4.0   ug/mL   AST    Collection Time: 07/11/24  7:02 AM   Result Value Ref Range    AST 24 0 - 45 U/L   CBC with platelets and differential    Collection Time: 07/11/24  7:02 AM   Result Value Ref Range    WBC Count 4.7 4.0 - 11.0 10e3/uL    RBC Count 3.53 (L) 4.40 - 5.90 10e6/uL    Hemoglobin 9.9 (L) 13.3 - 17.7 g/dL    Hematocrit 33.6 (L) 40.0 - 53.0 %    MCV 95 78 - 100 fL    MCH 28.0 26.5 - 33.0 pg    MCHC 29.5 (L) 31.5 - 36.5 g/dL    RDW 18.1 (H) 10.0 - 15.0 %    Platelet Count 382 150 - 450 10e3/uL    % Neutrophils 55 %    % Lymphocytes 26 %    % Monocytes 13 %    % Eosinophils 4 %    % Basophils 1 %    % Immature Granulocytes 1 %    NRBCs per 100 WBC 0 <1 /100    Absolute Neutrophils 2.6 1.6 - 8.3 10e3/uL    Absolute Lymphocytes 1.2 0.8 - 5.3 10e3/uL    Absolute Monocytes 0.6 0.0 - 1.3 10e3/uL    Absolute Eosinophils 0.2 0.0 - 0.7 10e3/uL    Absolute Basophils 0.0 0.0 - 0.2 10e3/uL    Absolute Immature Granulocytes 0.0 <=0.4 10e3/uL    Absolute NRBCs 0.0 10e3/uL   CRP inflammation    Collection Time: 07/18/24  9:35 AM    Result Value Ref Range    CRP Inflammation 10.10 (H) <5.00 mg/L   Creatinine    Collection Time: 07/18/24  9:35 AM   Result Value Ref Range    Creatinine 1.27 (H) 0.67 - 1.17 mg/dL    GFR Estimate 57 (L) >60 mL/min/1.73m2   Vancomycin level    Collection Time: 07/18/24  9:35 AM   Result Value Ref Range    Vancomycin <4.0   ug/mL   AST    Collection Time: 07/18/24  9:35 AM   Result Value Ref Range    AST 26 0 - 45 U/L   CBC with platelets and differential    Collection Time: 07/18/24  9:35 AM   Result Value Ref Range    WBC Count 6.9 4.0 - 11.0 10e3/uL    RBC Count 4.00 (L) 4.40 - 5.90 10e6/uL    Hemoglobin 11.2 (L) 13.3 - 17.7 g/dL    Hematocrit 38.4 (L) 40.0 - 53.0 %    MCV 96 78 - 100 fL    MCH 28.0 26.5 - 33.0 pg    MCHC 29.2 (L) 31.5 - 36.5 g/dL    RDW 17.2 (H) 10.0 - 15.0 %    Platelet Count 390 150 - 450 10e3/uL    % Neutrophils 65 %    % Lymphocytes 21 %    % Monocytes 9 %    % Eosinophils 3 %    % Basophils 1 %    % Immature Granulocytes 1 %    NRBCs per 100 WBC 0 <1 /100    Absolute Neutrophils 4.5 1.6 - 8.3 10e3/uL    Absolute Lymphocytes 1.4 0.8 - 5.3 10e3/uL    Absolute Monocytes 0.6 0.0 - 1.3 10e3/uL    Absolute Eosinophils 0.2 0.0 - 0.7 10e3/uL    Absolute Basophils 0.1 0.0 - 0.2 10e3/uL    Absolute Immature Granulocytes 0.0 <=0.4 10e3/uL    Absolute NRBCs 0.0 10e3/uL        MEDICATIONS:  MED REC REQUIRED  Post Medication Reconciliation Status: discharge medications reconciled, continue medications without change          Review of your medicines            Accurate as of July 19, 2024 11:59 PM. If you have any questions, ask your nurse or doctor.                CONTINUE these medicines which may have CHANGED, or have new prescriptions. If we are uncertain of the size of tablets/capsules you have at home, strength may be listed as something that might have changed.        Dose / Directions   torsemide 20 MG tablet  Commonly known as: DEMADEX  This may have changed: how much to take  Used for:  Essential hypertension      Dose: 20 mg  TAKE 1 TABLET DAILY  Quantity: 90 tablet  Refills: 3            CONTINUE these medicines which have NOT CHANGED        Dose / Directions   acetaminophen 325 MG tablet  Commonly known as: TYLENOL  Used for: Acute right-sided low back pain with right-sided sciatica      Dose: 650 mg  Take 2 tablets (650 mg) by mouth every 6 hours as needed for pain (and adjunct with moderate or severe pain or per patient request)  Refills: 0     aspirin 81 MG EC tablet  Used for: Coronary artery disease involving native coronary artery of native heart without angina pectoris      Dose: 81 mg  Take 1 tablet (81 mg) by mouth daily  Refills: 0     colchicine 0.6 MG tablet  Commonly known as: COLCRYS  Used for: Gouty arthropathy      TAKE 1 TABLET DAILY  Quantity: 90 tablet  Refills: 3     diclofenac 1 % topical gel  Commonly known as: VOLTAREN      Dose: 4 g  Apply 4 g topically 4 times daily as needed for moderate pain Left knee  Refills: 0     ertapenem 1 GM vial  Commonly known as: INVanz  Indication: Infection of Bone and Bone Marrow  Used for: Lumbar discitis      Dose: 1 g  Inject 1 g into the vein every 24 hours for 42 doses  Refills: 0     famotidine 20 MG tablet  Commonly known as: PEPCID      Dose: 20 mg  Take 1 tablet (20 mg) by mouth 2 times daily as needed (itching)  Quantity: 30 tablet  Refills: 0     ferrous gluconate 324 (38 Fe) MG tablet  Commonly known as: FERGON  Used for: Iron deficiency anemia, unspecified iron deficiency anemia type      Dose: 324 mg  Take 1 tablet (324 mg) by mouth daily  Refills: 0     fluticasone 50 MCG/ACT nasal spray  Commonly known as: FLONASE      Dose: 1-2 spray  Spray 1-2 sprays in nostril daily as needed  Refills: 0     gabapentin 100 MG capsule  Commonly known as: NEURONTIN      Dose: 100 mg  Take 100 mg by mouth 3 times daily  Refills: 0     glipiZIDE 2.5 MG 24 hr tablet  Commonly known as: GLUCOTROL XL      Dose: 2.5 mg  Take 2.5 mg by mouth  daily  Refills: 0     guaiFENesin 20 mg/mL liquid  Commonly known as: ROBITUSSIN      Dose: 200 mg  Take 200 mg by mouth every 4 hours as needed for cough  Refills: 0     insulin glargine 100 UNIT/ML pen  Commonly known as: LANTUS PEN  Used for: 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  Refills: 0     lactobacillus rhamnosus (GG) capsule      Dose: 2 capsule  Take 2 capsules by mouth daily Noon  Refills: 0     Lidocaine 4 % Patch  Commonly known as: LIDOCARE      Dose: 1 patch  Place 1 patch onto the skin at bedtime To prevent lidocaine toxicity, patient should be patch free for 12 hrs daily.  Refills: 0     lisinopril 10 MG tablet  Commonly known as: ZESTRIL  Used for: Chronic diastolic heart failure (H)      Dose: 10 mg  Take 1 tablet (10 mg) by mouth every 24 hours  Quantity: 90 tablet  Refills: 3     loperamide 2 MG tablet  Commonly known as: IMODIUM A-D      Dose: 2 mg  Take 2 mg by mouth 4 times daily as needed for diarrhea  Refills: 0     melatonin 5 MG Caps      Dose: 5 mg  Take 5 mg by mouth at bedtime  Refills: 0     methocarbamol 500 MG tablet  Commonly known as: ROBAXIN      Dose: 500 mg  Take 500 mg by mouth every 8 hours as needed for muscle spasms  Refills: 0     nortriptyline 10 MG capsule  Commonly known as: PAMELOR      Dose: 20 mg  Take 20 mg by mouth nightly as needed  Refills: 0     oxyCODONE 5 MG tablet  Commonly known as: ROXICODONE  Used for: Acute right-sided low back pain with right-sided sciatica      Dose: 2.5-5 mg  Take 0.5-1 tablets (2.5-5 mg) by mouth every 6 hours as needed for moderate to severe pain (2.5 mg for moderate pain, 5 mg for severe pain.)  Quantity: 10 tablet  Refills: 0     polyethylene glycol  17 GM/Dose powder  Commonly known as: MIRALAX  Used for: Chronic low back pain without sciatica, unspecified back pain laterality      Dose: 17 g  Take 17 g by mouth daily as needed for constipation  Quantity: 225 g  Refills: 0     polyethylene glycol-propylene glycol 0.4-0.3 % Soln ophthalmic solution  Commonly known as: SYSTANE ULTRA      Dose: 1 drop  1 drop daily  Refills: 0     Semaglutide 14 MG tablet  Commonly known as: RYBELSUS  Used for: Type 2 diabetes, HbA1c goal < 7% (H)      Dose: 14 mg  Take 14 mg by mouth daily  Quantity: 30 tablet  Refills: 3     senna-docusate 8.6-50 MG tablet  Commonly known as: SENOKOT-S/PERICOLACE      Dose: 1 tablet  Take 1 tablet by mouth 2 times daily as needed for constipation  Refills: 0     sodium bicarbonate 650 MG tablet  Used for: Acidosis      Dose: 1,950 mg  Take 3 tablets (1,950 mg) by mouth 3 times daily  Quantity: 180 tablet  Refills: 1     sodium chloride 0.9 % infusion      Dose: 10 mL  Inject 10 mLs into the vein continuously 24 hours before and after antibiotics  Refills: 0     tretinoin (emollient) 0.05 % Crea cream      Externally apply topically every 8 hours as needed  Refills: 0     Tums 500 MG chewable tablet  Generic drug: calcium carbonate      Dose: 1,000 mg  Take 1,000 mg by mouth 3 times daily as needed for heartburn  Refills: 0     ursodiol 300 MG capsule  Commonly known as: ACTIGALL  Used for: Biliary stricture (H28)      Dose: 300 mg  Take 1 capsule (300 mg) by mouth 2 times daily  Quantity: 60 capsule  Refills: 0              ASSESSMENT/PLAN  Encounter Diagnoses   Name Primary?    Infection due to 2019 novel coronavirus Yes    Physical deconditioning     Pain management      COVID-positive currently asymptomatic with an occasional dry cough.  Denies shortness of breath change in taste or smell will treat symptoms.    Diarrhea on Imodium 4 times daily as needed    Pain management as needed Tylenol, as needed oxycodone, diclofenac gel, lidocaine  patch, methocarbamol,  spinal cord stimulator    Mood disorder on nortriptyline     Physical deconditioning  PT/OT    Gout on colchicine    Bone infection on ertapenem until 8/6/2024, and vancomycin recently discontinued.  follow-up with infectious disease and neurosurgery,  pharmacy to dose antibiotics    Anemia on ferrous gluconate hemoglobin 11.2    Diabetes type 2 continue glipizide XL 2.5 mg daily, glargine 8 units subcu twice daily, semaglutide A1C 6/18/24 was 8.0    Hypertension on torsemide, decreased 7/18/24 to 10 mg po daily per his request., continue lisinopril    Electronically signed by: Darling Valdivia CNP

## 2024-07-23 ENCOUNTER — TELEPHONE (OUTPATIENT)
Dept: NEUROSURGERY | Facility: CLINIC | Age: 79
End: 2024-07-23
Payer: COMMERCIAL

## 2024-07-23 DIAGNOSIS — M46.40 DISCITIS: Primary | ICD-10-CM

## 2024-07-23 NOTE — TELEPHONE ENCOUNTER
Left voice message-schedule CT lumbar spine prior to appointment scheduled on 7/29 Monday. Writer provided imaging central scheduling team number [phone]

## 2024-07-24 ENCOUNTER — TELEPHONE (OUTPATIENT)
Dept: GERIATRICS | Facility: CLINIC | Age: 79
End: 2024-07-24
Payer: COMMERCIAL

## 2024-07-24 NOTE — TELEPHONE ENCOUNTER
"[facility] Triage Nurse Telephone Encounter    Provider: ZACH Mckenzie  Facility: [facility]  Facility Type:  TCU    Caller: Osbaldo  Call Back Number: [phone]    Allergies:    Allergies   Allergen Reactions    Ancef [Cefazolin] Rash    Cephalexin Itching and Rash     Allergic reaction required hospitalization 4/2024    Penicillins Anaphylaxis    Sulfa Antibiotics      \"itchy rash, swelling of face and hands\"        Reason for call: Patient requesting to have the lidocaine patch discontinued as he feels he does no longer needed it.     Verbal Order/Direction given by Provider: Discontinue lidocaine patch    Provider giving Order:  Anayeli Emery MD    Verbal Order given to: Osbaldo Choi RN      "

## 2024-07-25 ENCOUNTER — LAB REQUISITION (OUTPATIENT)
Dept: LAB | Facility: CLINIC | Age: 79
End: 2024-07-25
Payer: COMMERCIAL

## 2024-07-25 ENCOUNTER — TRANSITIONAL CARE UNIT VISIT (OUTPATIENT)
Dept: GERIATRICS | Facility: CLINIC | Age: 79
End: 2024-07-25
Payer: COMMERCIAL

## 2024-07-25 VITALS
OXYGEN SATURATION: 94 % | RESPIRATION RATE: 20 BRPM | SYSTOLIC BLOOD PRESSURE: 131 MMHG | WEIGHT: 192 LBS | TEMPERATURE: 97.2 F | BODY MASS INDEX: 26.01 KG/M2 | HEART RATE: 102 BPM | DIASTOLIC BLOOD PRESSURE: 78 MMHG | HEIGHT: 72 IN

## 2024-07-25 DIAGNOSIS — I10 PRIMARY HYPERTENSION: ICD-10-CM

## 2024-07-25 DIAGNOSIS — G89.29 CHRONIC LOW BACK PAIN WITHOUT SCIATICA, UNSPECIFIED BACK PAIN LATERALITY: Primary | ICD-10-CM

## 2024-07-25 DIAGNOSIS — R53.81 PHYSICAL DECONDITIONING: ICD-10-CM

## 2024-07-25 DIAGNOSIS — M54.50 CHRONIC LOW BACK PAIN WITHOUT SCIATICA, UNSPECIFIED BACK PAIN LATERALITY: Primary | ICD-10-CM

## 2024-07-25 DIAGNOSIS — R53.1 WEAKNESS: ICD-10-CM

## 2024-07-25 DIAGNOSIS — E11.51 TYPE 2 DIABETES MELLITUS WITH PERIPHERAL VASCULAR DISEASE (H): ICD-10-CM

## 2024-07-25 DIAGNOSIS — Z89.411 ACQUIRED ABSENCE OF RIGHT GREAT TOE (H): ICD-10-CM

## 2024-07-25 DIAGNOSIS — E11.621 TYPE 2 DIABETES MELLITUS WITH FOOT ULCER, WITHOUT LONG-TERM CURRENT USE OF INSULIN (H): ICD-10-CM

## 2024-07-25 DIAGNOSIS — R65.20 SEVERE SEPSIS (H): ICD-10-CM

## 2024-07-25 DIAGNOSIS — M46.40 DISCITIS, UNSPECIFIED SPINAL REGION: ICD-10-CM

## 2024-07-25 DIAGNOSIS — R09.02 HYPOXIA: ICD-10-CM

## 2024-07-25 DIAGNOSIS — A41.9 SEVERE SEPSIS (H): ICD-10-CM

## 2024-07-25 DIAGNOSIS — E66.01 MORBID OBESITY (H): ICD-10-CM

## 2024-07-25 DIAGNOSIS — U07.1 COVID-19: ICD-10-CM

## 2024-07-25 DIAGNOSIS — L97.509 TYPE 2 DIABETES MELLITUS WITH FOOT ULCER, WITHOUT LONG-TERM CURRENT USE OF INSULIN (H): ICD-10-CM

## 2024-07-25 DIAGNOSIS — I50.22 CHRONIC SYSTOLIC CONGESTIVE HEART FAILURE (H): ICD-10-CM

## 2024-07-25 PROBLEM — M86.9 OSTEOMYELITIS OF ANKLE AND FOOT (H): Status: RESOLVED | Noted: 2023-08-07 | Resolved: 2024-07-25

## 2024-07-25 PROBLEM — L97.514 DIABETIC ULCER OF TOE OF RIGHT FOOT ASSOCIATED WITH DIABETES MELLITUS DUE TO UNDERLYING CONDITION, WITH NECROSIS OF BONE (H): Status: RESOLVED | Noted: 2023-07-25 | Resolved: 2024-07-25

## 2024-07-25 PROBLEM — E08.621 DIABETIC ULCER OF TOE OF RIGHT FOOT ASSOCIATED WITH DIABETES MELLITUS DUE TO UNDERLYING CONDITION, WITH NECROSIS OF BONE (H): Status: RESOLVED | Noted: 2023-07-25 | Resolved: 2024-07-25

## 2024-07-25 PROCEDURE — 99305 1ST NF CARE MODERATE MDM 35: CPT | Performed by: FAMILY MEDICINE

## 2024-07-25 NOTE — LETTER
7/25/2024      Lance Ibrahim  [address]  [facility] [address]        [facility]       Patient Lance Ibrahim  MRN: [id number]        Reason for Visit     Chief Complaint   Patient presents with     RECHECK     INITIAL       Code Status     CPR/Full code     Assessment/plan     Discitis/osteomyelitis L4-L5 with severe foraminal narrowing status post IR guided L4-L5 disc aspiration and L4 biopsy done on 6/19/2024.  Presence of discitis osteomyelitis at L4-L5 with epidural abscess.  Severe spinal stenosis  Seen by neurosurgery as well by infectious disease  Discharged on IV ertapenem for 42 days  Also on IV vancomycin for 41 days  Continue weekly labs as ordered  No surgical intervention planned for now  Blood cultures no growth to date biopsy also aspirate no growth to date   has a scheduled follow-up for imaging follow-up for next week    Acute on chronic back pain with right lower extremity radiculopathy with severe spinal stenosis  Discharged on Tylenol Robaxin as well as oxycodone  Reporting minimal pain  Reduce oxycodone to 2.5 mg po q8 hr and discontinue at discharge  Discontinue lidocaine patch per pt request      Type 2 diabetes uncontrolled with an A1c of 8  On oral meds including glipizide and semaglutide.  Started on Lantus in the hospital  Monitor blood sugars and adjust as needed.  BG stable  Reduce BG checks to BID.    Diabetic neuropathy on gabapentin 3 times a day.  No pain concerns    Congestive heart failure with preserved ejection fraction, mild aortic stenosis.  Continue with medical management with lisinopril torsemide aspirin  Dose of torsemide reduced to 10 mg due to pt refusal.  Wts are stable.    Hypertension monitor blood pressures he is on lisinopril.    CKD 3 A monitor kidney functions discharge creatinine is    Chronic anemia on iron supplementation.    PAD with toe amputation on aspirin    Chronic knee pain  Scheduled for TKA in 2 mths per pt.    Obstructive sleep  apnea does not use CPAP  Has a issue with uvala which is chronic and is a side sleeper  Gets hypoxic at HS and needs O2    Generalized weakness  At baseline patient has been declining and has been moved to an assisted living facility he is using a walker for only short distances and primarily using a wheelchair  Now discharged to the TCU for strengthening and rehab        History     Patient is a very pleasant 79 year old male who is admitted to TCU  Patient presented to the hospital with acute on chronic back pain noted to have severe spinal stenosis.  He was transferred to [facility] and seen by neurosurgery he underwent IR guided L4-L5 disc aspiration and L4 biopsy on 6/19/2024.  He was diagnosed with osteomyelitis L4-L5 with epidural abscess and discharged on IV antibiotics.  Pain management optimized.  At baseline quite frail and discharged for strengthening and rehab    Past Medical & Surgical History     PAST MEDICAL HISTORY:   Past Medical History:   Diagnosis Date     Anemia      Arthritis     osteoarthritis knees     BPH      CAD (coronary artery disease)     subtotal occlusion in the small distal LAD      Cardiomyopathy      Cerebral artery occlusion with cerebral infarction     TIA 1993, no residual     Cervicalgia      CHF (congestive heart failure) (H)      Chronic kidney disease      Chronic low back pain      Chronic rhinitis      Gouty arthropathy     hands     Hyperlipidemia      Hypertension      Kidney stone      Nocturia      Obesity      Osteomyelitis (H) 08/2023    Foot     PVD (peripheral vascular disease)      Sleep apnea     doesn't use cpap     TIA (transient ischaemic attack) 1993     Type 2 diabetes mellitus - 11/08/2018     Ureteral stone       PAST SURGICAL HISTORY:   has a past surgical history that includes Diastasis recti repair (1985); Ventral hernia repair NOS (1987); ORIF Shoulder (Left); Colonoscopy (03/09/2013); hernia repair (07/13/2004); Foot surgery (04/2013);  Amputate toe(s) (Left, 10/15/2018); Arthroplasty knee (Right, 12/07/2018); Heart Catheterization with Possible Intervention (Left, 03/05/2019); Extracapsular cataract extration with intraocular lens implant (Left, 03/13/2017); Extracapsular cataract extration with intraocular lens implant (Right); Amputate foot (Right, 08/07/2023); Esophagoscopy, gastroscopy, duodenoscopy (EGD), combined (N/A, 04/30/2024); Endoscopic retrograde cholangiopancreatogram (N/A, 04/30/2024); Spinal Cord Stimulator Implant (04/03/2024); Prostate surgery (02/2024); and IR Disc Aspriation Injection (6/19/2024).      Past Social History     Reviewed,  reports that he quit smoking about 31 years ago. His smoking use included cigarettes. He has never been exposed to tobacco smoke. He has never used smokeless tobacco. He reports that he does not currently use alcohol. He reports that he does not use drugs.    Family History     Reviewed, and family history includes Alcohol/Drug in his paternal grandfather; Cancer in his father; Diabetes in his maternal grandfather; Family History Negative in his mother.    Medication List     Current Outpatient Medications   Medication Sig Dispense Refill     acetaminophen (TYLENOL) 325 MG tablet Take 2 tablets (650 mg) by mouth every 6 hours as needed for pain (and adjunct with moderate or severe pain or per patient request)       aspirin 81 MG EC tablet Take 1 tablet (81 mg) by mouth daily       colchicine (COLCYRS) 0.6 MG tablet TAKE 1 TABLET DAILY 90 tablet 3     diclofenac (VOLTAREN) 1 % topical gel Apply 4 g topically 4 times daily as needed for moderate pain Left knee       ertapenem (INVANZ) 1 GM vial Inject 1 g into the vein every 24 hours for 42 doses       ferrous gluconate (FERGON) 324 (38 Fe) MG tablet Take 1 tablet (324 mg) by mouth daily       fluticasone (FLONASE) 50 MCG/ACT nasal spray Spray 1-2 sprays in nostril daily as needed       gabapentin (NEURONTIN) 100 MG capsule Take 100 mg by mouth 3  times daily       glipiZIDE (GLUCOTROL XL) 2.5 MG 24 hr tablet Take 2.5 mg by mouth daily       guaiFENesin (ROBITUSSIN) 20 mg/mL liquid Take 200 mg by mouth every 4 hours as needed for cough       insulin glargine (LANTUS PEN) 100 UNIT/ML pen Inject 8 Units Subcutaneous 2 times daily Hold if Blood sugar less than 100.       lisinopril (ZESTRIL) 10 MG tablet Take 1 tablet (10 mg) by mouth every 24 hours 90 tablet 3     loperamide (IMODIUM A-D) 2 MG tablet Take 2 mg by mouth 4 times daily as needed for diarrhea       melatonin 5 MG CAPS Take 5 mg by mouth at bedtime       methocarbamol (ROBAXIN) 500 MG tablet Take 500 mg by mouth every 8 hours as needed for muscle spasms       oxyCODONE (ROXICODONE) 5 MG tablet Take 0.5-1 tablets (2.5-5 mg) by mouth every 6 hours as needed for moderate to severe pain (2.5 mg for moderate pain, 5 mg for severe pain.) 10 tablet 0     polyethylene glycol-propylene glycol (SYSTANE ULTRA) 0.4-0.3 % SOLN ophthalmic solution 1 drop daily       sodium bicarbonate 650 MG tablet Take 3 tablets (1,950 mg) by mouth 3 times daily 180 tablet 1     sodium chloride 0.9% infusion Inject 10 mLs into the vein continuously 24 hours before and after antibiotics       torsemide (DEMADEX) 20 MG tablet TAKE 1 TABLET DAILY (Patient taking differently: Take 10 mg by mouth daily) 90 tablet 3     ursodiol (ACTIGALL) 300 MG capsule Take 1 capsule (300 mg) by mouth 2 times daily 60 capsule 0     calcium carbonate (TUMS) 500 MG chewable tablet Take 1,000 mg by mouth 3 times daily as needed for heartburn       famotidine (PEPCID) 20 MG tablet Take 1 tablet (20 mg) by mouth 2 times daily as needed (itching) 30 tablet 0     lactobacillus rhamnosus, GG, (CULTURELL) capsule Take 2 capsules by mouth daily Noon       nortriptyline (PAMELOR) 10 MG capsule Take 20 mg by mouth nightly as needed       polyethylene glycol (MIRALAX) 17 GM/Dose powder Take 17 g by mouth daily as needed for constipation 225 g 0     Semaglutide  "(RYBELSUS) 14 MG tablet Take 14 mg by mouth daily 30 tablet 3     senna-docusate (SENOKOT-S/PERICOLACE) 8.6-50 MG tablet Take 1 tablet by mouth 2 times daily as needed for constipation       Tretinoin, Facial Wrinkles, (TRETINOIN, EMOLLIENT,) 0.05 % CREA cream Externally apply topically every 8 hours as needed       No current facility-administered medications for this visit.      MED REC REQUIRED  Post Medication Reconciliation Status:  Discharge medications reconciled, continue medications without change       Allergies     Allergies   Allergen Reactions     Ancef [Cefazolin] Rash     Cephalexin Itching and Rash     Allergic reaction required hospitalization 4/2024     Penicillins Anaphylaxis     Sulfa Antibiotics      \"itchy rash, swelling of face and hands\"       Review of Systems   A comprehensive review of 14 systems was done. Pertinent findings noted here and in history of present illness. All the rest negative.  Constitutional: Negative.  Negative for fever, chills, he has  activity change, appetite change and fatigue.   HENT: Negative for congestion and facial swelling.    Eyes: Negative for photophobia, redness and visual disturbance.   Respiratory: Negative for cough and chest tightness.    Cardiovascular: Negative for chest pain, palpitations and leg swelling.   Gastrointestinal: Negative for nausea, diarrhea, constipation, blood in stool and abdominal distention.   Genitourinary: Negative.    Musculoskeletal: Negative.does not walk much at baseline    Back pain is improved.  Skin: Negative.    Neurological: Negative for dizziness, tremors, syncope, weakness, light-headedness and headaches.   Hematological: Does not bruise/bleed easily.   Psychiatric/Behavioral: Negative.  Feels ready to go home      Physical Exam   /78   Pulse 102   Temp 97.2  F (36.2  C)   Resp 20   Ht 1.829 m (6')   Wt 87.1 kg (192 lb)   SpO2 94%   BMI 26.04 kg/m       Constitutional: Oriented to person, place, and time " and appears well-developed.   HEENT:  Normocephalic and atraumatic.  Eyes: Conjunctivae and EOM are normal. Pupils are equal, round, and reactive to light. No discharge.  No scleral icterus. Nose normal. Mouth/Throat: Oropharynx is clear and moist. No oropharyngeal exudate.    NECK: Normal range of motion. Neck supple. No JVD present. No tracheal deviation present. No thyromegaly present.   CARDIOVASCULAR: Normal rate, regular rhythm and intact distal pulses.  Exam reveals no gallop and no friction rub.  Systolic murmur present.  PULMONARY: Effort normal and breath sounds normal. No respiratory distress.No Wheezing or rales.  ABDOMEN: Soft. Bowel sounds are normal. No distension and no mass.  There is no tenderness. There is no rebound and no guarding. No HSM.  MUSCULOSKELETAL: Normal range of motion. Mild kyphosis, no tenderness.  LYMPH NODES: Has no cervical, supraclavicular, axillary and groin adenopathy.   NEUROLOGICAL: Alert and oriented to person, place, and time. No cranial nerve deficit.  Normal muscle tone. Coordination normal.   GENITOURINARY: Deferred exam.  SKIN: Skin is warm and dry. No rash noted. No erythema. No pallor.   EXTREMITIES: No cyanosis, no clubbing, no edema. No Deformity.  Missing toe  PSYCHIATRIC: Normal mood, affect and behavior.      Lab Results     Recent Results (from the past 240 hour(s))   CRP inflammation    Collection Time: 07/18/24  9:35 AM   Result Value Ref Range    CRP Inflammation 10.10 (H) <5.00 mg/L   Creatinine    Collection Time: 07/18/24  9:35 AM   Result Value Ref Range    Creatinine 1.27 (H) 0.67 - 1.17 mg/dL    GFR Estimate 57 (L) >60 mL/min/1.73m2   Vancomycin level    Collection Time: 07/18/24  9:35 AM   Result Value Ref Range    Vancomycin <4.0   ug/mL   AST    Collection Time: 07/18/24  9:35 AM   Result Value Ref Range    AST 26 0 - 45 U/L   CBC with platelets and differential    Collection Time: 07/18/24  9:35 AM   Result Value Ref Range    WBC Count 6.9 4.0 -  11.0 10e3/uL    RBC Count 4.00 (L) 4.40 - 5.90 10e6/uL    Hemoglobin 11.2 (L) 13.3 - 17.7 g/dL    Hematocrit 38.4 (L) 40.0 - 53.0 %    MCV 96 78 - 100 fL    MCH 28.0 26.5 - 33.0 pg    MCHC 29.2 (L) 31.5 - 36.5 g/dL    RDW 17.2 (H) 10.0 - 15.0 %    Platelet Count 390 150 - 450 10e3/uL    % Neutrophils 65 %    % Lymphocytes 21 %    % Monocytes 9 %    % Eosinophils 3 %    % Basophils 1 %    % Immature Granulocytes 1 %    NRBCs per 100 WBC 0 <1 /100    Absolute Neutrophils 4.5 1.6 - 8.3 10e3/uL    Absolute Lymphocytes 1.4 0.8 - 5.3 10e3/uL    Absolute Monocytes 0.6 0.0 - 1.3 10e3/uL    Absolute Eosinophils 0.2 0.0 - 0.7 10e3/uL    Absolute Basophils 0.1 0.0 - 0.2 10e3/uL    Absolute Immature Granulocytes 0.0 <=0.4 10e3/uL    Absolute NRBCs 0.0 10e3/uL             Electronically signed by    Anayeli Emery MD                            Sincerely,        MIRELLA Fallon

## 2024-07-25 NOTE — PROGRESS NOTES
[facility] GERIATRIC SERVICES       Patient Lance Ibrahim  MRN: [id number]        Reason for Visit     Chief Complaint   Patient presents with    RECHECK     INITIAL       Code Status     CPR/Full code     Assessment/plan     Discitis/osteomyelitis L4-L5 with severe foraminal narrowing status post IR guided L4-L5 disc aspiration and L4 biopsy done on 6/19/2024.  Presence of discitis osteomyelitis at L4-L5 with epidural abscess.  Severe spinal stenosis  Seen by neurosurgery as well by infectious disease  Discharged on IV ertapenem for 42 days  Also on IV vancomycin for 41 days  Continue weekly labs as ordered  No surgical intervention planned for now  Blood cultures no growth to date biopsy also aspirate no growth to date   has a scheduled follow-up for imaging follow-up for next week    Acute on chronic back pain with right lower extremity radiculopathy with severe spinal stenosis  Discharged on Tylenol Robaxin as well as oxycodone  Reporting minimal pain  Reduce oxycodone to 2.5 mg po q8 hr and discontinue at discharge  Discontinue lidocaine patch per pt request      Type 2 diabetes uncontrolled with an A1c of 8  On oral meds including glipizide and semaglutide.  Started on Lantus in the hospital  Monitor blood sugars and adjust as needed.  BG stable  Reduce BG checks to BID.    Diabetic neuropathy on gabapentin 3 times a day.  No pain concerns    Congestive heart failure with preserved ejection fraction, mild aortic stenosis.  Continue with medical management with lisinopril torsemide aspirin  Dose of torsemide reduced to 10 mg due to pt refusal.  Wts are stable.    Hypertension monitor blood pressures he is on lisinopril.    CKD 3 A monitor kidney functions discharge creatinine is    Chronic anemia on iron supplementation.    PAD with toe amputation on aspirin    Chronic knee pain  Scheduled for TKA in 2 mths per pt.    Obstructive sleep apnea does not use CPAP  Has a issue with uvala which is chronic and is a side  sleeper  Gets hypoxic at  and needs O2    Generalized weakness  At baseline patient has been declining and has been moved to an assisted living facility he is using a walker for only short distances and primarily using a wheelchair  Now discharged to the TCU for strengthening and rehab        History     Patient is a very pleasant 79 year old male who is admitted to TCU  Patient presented to the hospital with acute on chronic back pain noted to have severe spinal stenosis.  He was transferred to [facility] and seen by neurosurgery he underwent IR guided L4-L5 disc aspiration and L4 biopsy on 6/19/2024.  He was diagnosed with osteomyelitis L4-L5 with epidural abscess and discharged on IV antibiotics.  Pain management optimized.  At baseline quite frail and discharged for strengthening and rehab    Past Medical & Surgical History     PAST MEDICAL HISTORY:   Past Medical History:   Diagnosis Date    Anemia     Arthritis     osteoarthritis knees    BPH     CAD (coronary artery disease)     subtotal occlusion in the small distal LAD     Cardiomyopathy     Cerebral artery occlusion with cerebral infarction     TIA 1993, no residual    Cervicalgia     CHF (congestive heart failure) (H)     Chronic kidney disease     Chronic low back pain     Chronic rhinitis     Gouty arthropathy     hands    Hyperlipidemia     Hypertension     Kidney stone     Nocturia     Obesity     Osteomyelitis (H) 08/2023    Foot    PVD (peripheral vascular disease)     Sleep apnea     doesn't use cpap    TIA (transient ischaemic attack) 1993    Type 2 diabetes mellitus - 11/08/2018    Ureteral stone       PAST SURGICAL HISTORY:   has a past surgical history that includes Diastasis recti repair (1985); Ventral hernia repair NOS (1987); ORIF Shoulder (Left); Colonoscopy (03/09/2013); hernia repair (07/13/2004); Foot surgery (04/2013); Amputate toe(s) (Left, 10/15/2018); Arthroplasty knee (Right, 12/07/2018); Heart Catheterization with  Possible Intervention (Left, 03/05/2019); Extracapsular cataract extration with intraocular lens implant (Left, 03/13/2017); Extracapsular cataract extration with intraocular lens implant (Right); Amputate foot (Right, 08/07/2023); Esophagoscopy, gastroscopy, duodenoscopy (EGD), combined (N/A, 04/30/2024); Endoscopic retrograde cholangiopancreatogram (N/A, 04/30/2024); Spinal Cord Stimulator Implant (04/03/2024); Prostate surgery (02/2024); and IR Disc Aspriation Injection (6/19/2024).      Past Social History     Reviewed,  reports that he quit smoking about 31 years ago. His smoking use included cigarettes. He has never been exposed to tobacco smoke. He has never used smokeless tobacco. He reports that he does not currently use alcohol. He reports that he does not use drugs.    Family History     Reviewed, and family history includes Alcohol/Drug in his paternal grandfather; Cancer in his father; Diabetes in his maternal grandfather; Family History Negative in his mother.    Medication List     Current Outpatient Medications   Medication Sig Dispense Refill    acetaminophen (TYLENOL) 325 MG tablet Take 2 tablets (650 mg) by mouth every 6 hours as needed for pain (and adjunct with moderate or severe pain or per patient request)      aspirin 81 MG EC tablet Take 1 tablet (81 mg) by mouth daily      colchicine (COLCYRS) 0.6 MG tablet TAKE 1 TABLET DAILY 90 tablet 3    diclofenac (VOLTAREN) 1 % topical gel Apply 4 g topically 4 times daily as needed for moderate pain Left knee      ertapenem (INVANZ) 1 GM vial Inject 1 g into the vein every 24 hours for 42 doses      ferrous gluconate (FERGON) 324 (38 Fe) MG tablet Take 1 tablet (324 mg) by mouth daily      fluticasone (FLONASE) 50 MCG/ACT nasal spray Spray 1-2 sprays in nostril daily as needed      gabapentin (NEURONTIN) 100 MG capsule Take 100 mg by mouth 3 times daily      glipiZIDE (GLUCOTROL XL) 2.5 MG 24 hr tablet Take 2.5 mg by mouth daily      guaiFENesin  (ROBITUSSIN) 20 mg/mL liquid Take 200 mg by mouth every 4 hours as needed for cough      insulin glargine (LANTUS PEN) 100 UNIT/ML pen Inject 8 Units Subcutaneous 2 times daily Hold if Blood sugar less than 100.      lisinopril (ZESTRIL) 10 MG tablet Take 1 tablet (10 mg) by mouth every 24 hours 90 tablet 3    loperamide (IMODIUM A-D) 2 MG tablet Take 2 mg by mouth 4 times daily as needed for diarrhea      melatonin 5 MG CAPS Take 5 mg by mouth at bedtime      methocarbamol (ROBAXIN) 500 MG tablet Take 500 mg by mouth every 8 hours as needed for muscle spasms      oxyCODONE (ROXICODONE) 5 MG tablet Take 0.5-1 tablets (2.5-5 mg) by mouth every 6 hours as needed for moderate to severe pain (2.5 mg for moderate pain, 5 mg for severe pain.) 10 tablet 0    polyethylene glycol-propylene glycol (SYSTANE ULTRA) 0.4-0.3 % SOLN ophthalmic solution 1 drop daily      sodium bicarbonate 650 MG tablet Take 3 tablets (1,950 mg) by mouth 3 times daily 180 tablet 1    sodium chloride 0.9% infusion Inject 10 mLs into the vein continuously 24 hours before and after antibiotics      torsemide (DEMADEX) 20 MG tablet TAKE 1 TABLET DAILY (Patient taking differently: Take 10 mg by mouth daily) 90 tablet 3    ursodiol (ACTIGALL) 300 MG capsule Take 1 capsule (300 mg) by mouth 2 times daily 60 capsule 0    calcium carbonate (TUMS) 500 MG chewable tablet Take 1,000 mg by mouth 3 times daily as needed for heartburn      famotidine (PEPCID) 20 MG tablet Take 1 tablet (20 mg) by mouth 2 times daily as needed (itching) 30 tablet 0    lactobacillus rhamnosus, GG, (CULTURELL) capsule Take 2 capsules by mouth daily Noon      nortriptyline (PAMELOR) 10 MG capsule Take 20 mg by mouth nightly as needed      polyethylene glycol (MIRALAX) 17 GM/Dose powder Take 17 g by mouth daily as needed for constipation 225 g 0    Semaglutide (RYBELSUS) 14 MG tablet Take 14 mg by mouth daily 30 tablet 3    senna-docusate (SENOKOT-S/PERICOLACE) 8.6-50 MG tablet Take 1  "tablet by mouth 2 times daily as needed for constipation      Tretinoin, Facial Wrinkles, (TRETINOIN, EMOLLIENT,) 0.05 % CREA cream Externally apply topically every 8 hours as needed       No current facility-administered medications for this visit.      MED REC REQUIRED  Post Medication Reconciliation Status:  Discharge medications reconciled, continue medications without change       Allergies     Allergies   Allergen Reactions    Ancef [Cefazolin] Rash    Cephalexin Itching and Rash     Allergic reaction required hospitalization 4/2024    Penicillins Anaphylaxis    Sulfa Antibiotics      \"itchy rash, swelling of face and hands\"       Review of Systems   A comprehensive review of 14 systems was done. Pertinent findings noted here and in history of present illness. All the rest negative.  Constitutional: Negative.  Negative for fever, chills, he has  activity change, appetite change and fatigue.   HENT: Negative for congestion and facial swelling.    Eyes: Negative for photophobia, redness and visual disturbance.   Respiratory: Negative for cough and chest tightness.    Cardiovascular: Negative for chest pain, palpitations and leg swelling.   Gastrointestinal: Negative for nausea, diarrhea, constipation, blood in stool and abdominal distention.   Genitourinary: Negative.    Musculoskeletal: Negative.does not walk much at baseline    Back pain is improved.  Skin: Negative.    Neurological: Negative for dizziness, tremors, syncope, weakness, light-headedness and headaches.   Hematological: Does not bruise/bleed easily.   Psychiatric/Behavioral: Negative.  Feels ready to go home      Physical Exam   /78   Pulse 102   Temp 97.2  F (36.2  C)   Resp 20   Ht 1.829 m (6')   Wt 87.1 kg (192 lb)   SpO2 94%   BMI 26.04 kg/m       Constitutional: Oriented to person, place, and time and appears well-developed.   HEENT:  Normocephalic and atraumatic.  Eyes: Conjunctivae and EOM are normal. Pupils are equal, round, " and reactive to light. No discharge.  No scleral icterus. Nose normal. Mouth/Throat: Oropharynx is clear and moist. No oropharyngeal exudate.    NECK: Normal range of motion. Neck supple. No JVD present. No tracheal deviation present. No thyromegaly present.   CARDIOVASCULAR: Normal rate, regular rhythm and intact distal pulses.  Exam reveals no gallop and no friction rub.  Systolic murmur present.  PULMONARY: Effort normal and breath sounds normal. No respiratory distress.No Wheezing or rales.  ABDOMEN: Soft. Bowel sounds are normal. No distension and no mass.  There is no tenderness. There is no rebound and no guarding. No HSM.  MUSCULOSKELETAL: Normal range of motion. Mild kyphosis, no tenderness.  LYMPH NODES: Has no cervical, supraclavicular, axillary and groin adenopathy.   NEUROLOGICAL: Alert and oriented to person, place, and time. No cranial nerve deficit.  Normal muscle tone. Coordination normal.   GENITOURINARY: Deferred exam.  SKIN: Skin is warm and dry. No rash noted. No erythema. No pallor.   EXTREMITIES: No cyanosis, no clubbing, no edema. No Deformity.  Missing toe  PSYCHIATRIC: Normal mood, affect and behavior.      Lab Results     Recent Results (from the past 240 hour(s))   CRP inflammation    Collection Time: 07/18/24  9:35 AM   Result Value Ref Range    CRP Inflammation 10.10 (H) <5.00 mg/L   Creatinine    Collection Time: 07/18/24  9:35 AM   Result Value Ref Range    Creatinine 1.27 (H) 0.67 - 1.17 mg/dL    GFR Estimate 57 (L) >60 mL/min/1.73m2   Vancomycin level    Collection Time: 07/18/24  9:35 AM   Result Value Ref Range    Vancomycin <4.0   ug/mL   AST    Collection Time: 07/18/24  9:35 AM   Result Value Ref Range    AST 26 0 - 45 U/L   CBC with platelets and differential    Collection Time: 07/18/24  9:35 AM   Result Value Ref Range    WBC Count 6.9 4.0 - 11.0 10e3/uL    RBC Count 4.00 (L) 4.40 - 5.90 10e6/uL    Hemoglobin 11.2 (L) 13.3 - 17.7 g/dL    Hematocrit 38.4 (L) 40.0 - 53.0 %     MCV 96 78 - 100 fL    MCH 28.0 26.5 - 33.0 pg    MCHC 29.2 (L) 31.5 - 36.5 g/dL    RDW 17.2 (H) 10.0 - 15.0 %    Platelet Count 390 150 - 450 10e3/uL    % Neutrophils 65 %    % Lymphocytes 21 %    % Monocytes 9 %    % Eosinophils 3 %    % Basophils 1 %    % Immature Granulocytes 1 %    NRBCs per 100 WBC 0 <1 /100    Absolute Neutrophils 4.5 1.6 - 8.3 10e3/uL    Absolute Lymphocytes 1.4 0.8 - 5.3 10e3/uL    Absolute Monocytes 0.6 0.0 - 1.3 10e3/uL    Absolute Eosinophils 0.2 0.0 - 0.7 10e3/uL    Absolute Basophils 0.1 0.0 - 0.2 10e3/uL    Absolute Immature Granulocytes 0.0 <=0.4 10e3/uL    Absolute NRBCs 0.0 10e3/uL             Electronically signed by    Anayeli Emery MD

## 2024-07-26 ENCOUNTER — HOSPITAL ENCOUNTER (OUTPATIENT)
Dept: MRI IMAGING | Facility: CLINIC | Age: 79
Discharge: HOME | End: 2024-07-26
Attending: PHYSICIAN ASSISTANT
Payer: COMMERCIAL

## 2024-07-26 ENCOUNTER — PATIENT OUTREACH (OUTPATIENT)
Dept: CARE COORDINATION | Facility: CLINIC | Age: 79
End: 2024-07-26

## 2024-07-26 ENCOUNTER — HOSPITAL ENCOUNTER (OUTPATIENT)
Dept: CT IMAGING | Facility: CLINIC | Age: 79
Discharge: HOME | End: 2024-07-26
Attending: NEUROLOGICAL SURGERY
Payer: COMMERCIAL

## 2024-07-26 DIAGNOSIS — M46.46 LUMBAR DISCITIS: ICD-10-CM

## 2024-07-26 DIAGNOSIS — M46.40 DISCITIS: ICD-10-CM

## 2024-07-26 LAB
AST SERPL W P-5'-P-CCNC: 78 U/L (ref 0–45)
BASOPHILS # BLD AUTO: 0 10E3/UL (ref 0–0.2)
BASOPHILS NFR BLD AUTO: 1 %
CREAT SERPL-MCNC: 1.26 MG/DL (ref 0.67–1.17)
CRP SERPL-MCNC: 9.62 MG/L
EGFRCR SERPLBLD CKD-EPI 2021: 58 ML/MIN/1.73M2
EOSINOPHIL # BLD AUTO: 0.1 10E3/UL (ref 0–0.7)
EOSINOPHIL NFR BLD AUTO: 3 %
ERYTHROCYTE [DISTWIDTH] IN BLOOD BY AUTOMATED COUNT: 16.6 % (ref 10–15)
HCT VFR BLD AUTO: 39.4 % (ref 40–53)
HGB BLD-MCNC: 11.9 G/DL (ref 13.3–17.7)
IMM GRANULOCYTES # BLD: 0 10E3/UL
IMM GRANULOCYTES NFR BLD: 1 %
LYMPHOCYTES # BLD AUTO: 1.2 10E3/UL (ref 0.8–5.3)
LYMPHOCYTES NFR BLD AUTO: 28 %
MCH RBC QN AUTO: 27.8 PG (ref 26.5–33)
MCHC RBC AUTO-ENTMCNC: 30.2 G/DL (ref 31.5–36.5)
MCV RBC AUTO: 92 FL (ref 78–100)
MONOCYTES # BLD AUTO: 0.4 10E3/UL (ref 0–1.3)
MONOCYTES NFR BLD AUTO: 9 %
NEUTROPHILS # BLD AUTO: 2.6 10E3/UL (ref 1.6–8.3)
NEUTROPHILS NFR BLD AUTO: 58 %
NRBC # BLD AUTO: 0 10E3/UL
NRBC BLD AUTO-RTO: 0 /100
PLATELET # BLD AUTO: 290 10E3/UL (ref 150–450)
RBC # BLD AUTO: 4.28 10E6/UL (ref 4.4–5.9)
WBC # BLD AUTO: 4.4 10E3/UL (ref 4–11)

## 2024-07-26 PROCEDURE — 82565 ASSAY OF CREATININE: CPT | Mod: ORL | Performed by: FAMILY MEDICINE

## 2024-07-26 PROCEDURE — 72158 MRI LUMBAR SPINE W/O & W/DYE: CPT

## 2024-07-26 PROCEDURE — A9585 GADOBUTROL INJECTION: HCPCS | Performed by: PHYSICIAN ASSISTANT

## 2024-07-26 PROCEDURE — 85025 COMPLETE CBC W/AUTO DIFF WBC: CPT | Mod: ORL | Performed by: FAMILY MEDICINE

## 2024-07-26 PROCEDURE — 255N000002 HC RX 255 OP 636: Performed by: PHYSICIAN ASSISTANT

## 2024-07-26 PROCEDURE — 86140 C-REACTIVE PROTEIN: CPT | Mod: ORL | Performed by: FAMILY MEDICINE

## 2024-07-26 PROCEDURE — 36415 COLL VENOUS BLD VENIPUNCTURE: CPT | Mod: ORL | Performed by: FAMILY MEDICINE

## 2024-07-26 PROCEDURE — 84450 TRANSFERASE (AST) (SGOT): CPT | Mod: ORL | Performed by: FAMILY MEDICINE

## 2024-07-26 PROCEDURE — 72131 CT LUMBAR SPINE W/O DYE: CPT

## 2024-07-26 PROCEDURE — P9604 ONE-WAY ALLOW PRORATED TRIP: HCPCS | Mod: ORL | Performed by: FAMILY MEDICINE

## 2024-07-26 RX ORDER — GADOBUTROL 604.72 MG/ML
8.5 INJECTION INTRAVENOUS ONCE
Status: COMPLETED | OUTPATIENT
Start: 2024-07-26 | End: 2024-07-26

## 2024-07-26 RX ADMIN — GADOBUTROL 8.5 ML: 604.72 INJECTION INTRAVENOUS at 12:24

## 2024-07-26 NOTE — TELEPHONE ENCOUNTER
Spoke with Omari (C2C on file), pt's step-child, and relayed that a CT should be scheduled prior to the appointment on Monday. Writer provided imaging scheduling phone number [phone].

## 2024-07-26 NOTE — PROGRESS NOTES
Contact   Chart Review     Situation: Patient chart reviewed by .    Background: in TCU.    Assessment: Patient continues to receive care in TCU.    Plan/Recommendations: Will call after discharge to assess needs and offer support. Or do chart review in 4 weeks.     Bonnie Kilpatrick,   [facility]  [phone]

## 2024-07-26 NOTE — TELEPHONE ENCOUNTER
2nd attempt. Left voice message-schedule CT lumbar spine prior to appointment scheduled on 7/29 Monday. Writer provided imaging central scheduling team number [phone]

## 2024-07-28 ENCOUNTER — LAB REQUISITION (OUTPATIENT)
Dept: LAB | Facility: CLINIC | Age: 79
End: 2024-07-28
Payer: COMMERCIAL

## 2024-07-28 DIAGNOSIS — M46.26 OSTEOMYELITIS OF VERTEBRA, LUMBAR REGION (H): ICD-10-CM

## 2024-07-29 ENCOUNTER — VIRTUAL VISIT (OUTPATIENT)
Dept: NEUROSURGERY | Facility: CLINIC | Age: 79
End: 2024-07-29
Payer: COMMERCIAL

## 2024-07-29 DIAGNOSIS — M46.47 DISCITIS OF LUMBOSACRAL REGION: Primary | ICD-10-CM

## 2024-07-29 LAB — AST SERPL W P-5'-P-CCNC: 67 U/L (ref 0–45)

## 2024-07-29 PROCEDURE — 84450 TRANSFERASE (AST) (SGOT): CPT | Mod: ORL | Performed by: FAMILY MEDICINE

## 2024-07-29 PROCEDURE — 99442 PR PHYSICIAN TELEPHONE EVALUATION 11-20 MIN: CPT | Mod: 93 | Performed by: NEUROLOGICAL SURGERY

## 2024-07-29 PROCEDURE — 36415 COLL VENOUS BLD VENIPUNCTURE: CPT | Mod: ORL | Performed by: FAMILY MEDICINE

## 2024-07-29 PROCEDURE — P9604 ONE-WAY ALLOW PRORATED TRIP: HCPCS | Mod: ORL | Performed by: FAMILY MEDICINE

## 2024-07-29 RX ORDER — LIDOCAINE 4 G/G
1 PATCH TOPICAL EVERY 24 HOURS
COMMUNITY

## 2024-07-29 NOTE — NURSING NOTE
Lance is a 79 year old who is being evaluated via a billable telephone visit.    What phone number would you like to be contacted at? [phone]   How would you like to obtain your AVS? Karthikhart  Originating Location (pt. Location): [facility]    Distant Location (provider location):  On-site  Phone call duration: 6 minutes

## 2024-07-29 NOTE — LETTER
7/29/2024      Lance Ibrahim  [address] [place] [id number]      Dear Colleague,    Thank you for referring your patient, Lance Ibrahim, to the [facility]. Please see a copy of my visit note below.    NEUROSURGERY FOLLOWUP  NOTE    Lance Ibrahim is here today for virtual telephone visit with his son Omari for f/u 6 weeks with MRI lumbar spine with and without contrast and CT lumbar spine.  Patient was admitted at the [facility] from 6/18/2024 to 6/25/2024 with L4-5 discitis osteomyelitis.  He underwent IR guided L4-5 disc aspiration and L4 biopsy on 6/19/2024.  Patient had no growth on cultures.  He received vancomycin till 07/31/2024 and ertapenem till 8/5/2024 for Klebsiella bacteremia.    Patient reports significant improvement in his symptoms with intermittent low back pain of 1 to 2 x 10 on VAS intensity.  He denies any new onset pain radiating to his bilateral lower extremities, imbalance on walking, motor weakness, sensory symptoms or bladder or bowel symptoms.      PHYSICAL EXAM:   Virtual telephone visit    IMAGING:   I personally reviewed all radiographic images and agree with the neuroradiology report of significant resolution of ventral epidural collection and enhancement involving the L4-5 disc space.     7/26/2024 MRI lumbar spine with and without contrast:  IMPRESSION:  1.  Resolution of previously seen intramural abscess with small amount of residual ventral epidural phlegmon. Resolution of previously seen abscesses in the left lumbosacral dorsal paraspinal musculature.  2.  Evolution of osseous changes of the L4 and L5 vertebral bodies with interval loss of intervertebral disc space height.  3.  No high-grade spinal canal stenosis.  4.  Moderate neural foraminal stenosis bilaterally at L4-L5 and on the right at L5-S1.    7/26/2024 CT lumbar spine:  IMPRESSION:  1.  L4-5 inflammatory process with endplate erosions and retroperitoneal inflammatory change.  Epidural thickening in the ventral canal. Compatible with discitis and osteomyelitis and correlates with the MRI finding.     2.  No significant pathologic fracture or vertebral body height loss.     3.  Lumbar spondylosis.     CONSULTATION ASSESSMENT AND PLAN:    Lance Ibrahim is here today for virtual telephone visit with his son Omari for f/u 6 weeks with MRI lumbar spine with and without contrast and CT lumbar spine.  Patient was admitted at the [facility] from 6/18/2024 to 6/25/2024 with L4-5 discitis osteomyelitis.  He underwent IR guided L4-5 disc aspiration and L4 biopsy on 6/19/2024.  Patient had no growth on cultures.  He received vancomycin till 07/31/2024 and ertapenem till 8/5/2024 for Klebsiella bacteremia.    Patient reports significant improvement in his symptoms of low back pain with antibiotics.  He denies any new onset neurological symptoms during his telephone visit today.    I discussed the MRI lumbar spine with and without contrast and CT lumbar spine findings with the patient and his son and explained them that there is significant resolution of epidural collection at L4-5 and abnormal enhancement at that level suggestive of the responsiveness of the infection to antibiotics.  He can continue to follow-up with infectious disease regarding the same.    I will refer him for outpatient physical therapy for back strengthening exercises.  He can follow-up with me on as-needed basis.      Patient agreed with the plan.  All the questions were answered and patient sounded understanding.  He can contact us if there are any further questions or concerns or worsening neurological deficits.I spent more than 20 minutes in this apt, examining the pt, reviewing the scans, reviewing notes from chart, discussing treatment options with risks and benefits and coordinating care. This note was created in part by the use of Dragon voice recognition system. Inadvertent grammatical errors and typographical  errors may have occurred due to inherent limitation of voice recognition software.  Reasonable attempts made to avoid errors, but this document may contain an error not identified before finalizing.  Please contact me for any clarification needed.        Chai Conklin MD      CC:     Rickey Adamson  [address]  [facility] [address]      Again, thank you for allowing me to participate in the care of your patient.        Sincerely,        Chai Conklin MD

## 2024-07-29 NOTE — PROGRESS NOTES
NEUROSURGERY FOLLOWUP  NOTE    Lance Ibrahim is here today for virtual telephone visit with his son Omari for f/u 6 weeks with MRI lumbar spine with and without contrast and CT lumbar spine.  Patient was admitted at the [facility] from 6/18/2024 to 6/25/2024 with L4-5 discitis osteomyelitis.  He underwent IR guided L4-5 disc aspiration and L4 biopsy on 6/19/2024.  Patient had no growth on cultures.  He received vancomycin till 07/31/2024 and ertapenem till 8/5/2024 for Klebsiella bacteremia.    Patient reports significant improvement in his symptoms with intermittent low back pain of 1 to 2 x 10 on VAS intensity.  He denies any new onset pain radiating to his bilateral lower extremities, imbalance on walking, motor weakness, sensory symptoms or bladder or bowel symptoms.      PHYSICAL EXAM:   Virtual telephone visit    IMAGING:   I personally reviewed all radiographic images and agree with the neuroradiology report of significant resolution of ventral epidural collection and enhancement involving the L4-5 disc space.     7/26/2024 MRI lumbar spine with and without contrast:  IMPRESSION:  1.  Resolution of previously seen intramural abscess with small amount of residual ventral epidural phlegmon. Resolution of previously seen abscesses in the left lumbosacral dorsal paraspinal musculature.  2.  Evolution of osseous changes of the L4 and L5 vertebral bodies with interval loss of intervertebral disc space height.  3.  No high-grade spinal canal stenosis.  4.  Moderate neural foraminal stenosis bilaterally at L4-L5 and on the right at L5-S1.    7/26/2024 CT lumbar spine:  IMPRESSION:  1.  L4-5 inflammatory process with endplate erosions and retroperitoneal inflammatory change. Epidural thickening in the ventral canal. Compatible with discitis and osteomyelitis and correlates with the MRI finding.     2.  No significant pathologic fracture or vertebral body height loss.     3.  Lumbar spondylosis.     CONSULTATION  ASSESSMENT AND PLAN:    Lance Ibrahim is here today for virtual telephone visit with his son Omari for f/u 6 weeks with MRI lumbar spine with and without contrast and CT lumbar spine.  Patient was admitted at the [facility] from 6/18/2024 to 6/25/2024 with L4-5 discitis osteomyelitis.  He underwent IR guided L4-5 disc aspiration and L4 biopsy on 6/19/2024.  Patient had no growth on cultures.  He received vancomycin till 07/31/2024 and ertapenem till 8/5/2024 for Klebsiella bacteremia.    Patient reports significant improvement in his symptoms of low back pain with antibiotics.  He denies any new onset neurological symptoms during his telephone visit today.    I discussed the MRI lumbar spine with and without contrast and CT lumbar spine findings with the patient and his son and explained them that there is significant resolution of epidural collection at L4-5 and abnormal enhancement at that level suggestive of the responsiveness of the infection to antibiotics.  He can continue to follow-up with infectious disease regarding the same.    I will refer him for outpatient physical therapy for back strengthening exercises.  He can follow-up with me on as-needed basis.      Patient agreed with the plan.  All the questions were answered and patient sounded understanding.  He can contact us if there are any further questions or concerns or worsening neurological deficits.I spent more than 20 minutes in this apt, examining the pt, reviewing the scans, reviewing notes from chart, discussing treatment options with risks and benefits and coordinating care. This note was created in part by the use of Dragon voice recognition system. Inadvertent grammatical errors and typographical errors may have occurred due to inherent limitation of voice recognition software.  Reasonable attempts made to avoid errors, but this document may contain an error not identified before finalizing.  Please contact me for any clarification  needed.        Chai Conklin MD      CC:     Rickey Adamson  [address] [facility] [address]  [facility] [address]

## 2024-07-30 ENCOUNTER — TRANSITIONAL CARE UNIT VISIT (OUTPATIENT)
Dept: GERIATRICS | Facility: CLINIC | Age: 79
End: 2024-07-30
Payer: COMMERCIAL

## 2024-07-30 VITALS
RESPIRATION RATE: 18 BRPM | HEIGHT: 72 IN | BODY MASS INDEX: 25.73 KG/M2 | HEART RATE: 91 BPM | DIASTOLIC BLOOD PRESSURE: 73 MMHG | TEMPERATURE: 96.3 F | OXYGEN SATURATION: 96 % | WEIGHT: 190 LBS | SYSTOLIC BLOOD PRESSURE: 150 MMHG

## 2024-07-30 DIAGNOSIS — I50.22 CHRONIC SYSTOLIC CONGESTIVE HEART FAILURE (H): ICD-10-CM

## 2024-07-30 DIAGNOSIS — R53.81 PHYSICAL DECONDITIONING: Primary | ICD-10-CM

## 2024-07-30 DIAGNOSIS — R52 PAIN MANAGEMENT: ICD-10-CM

## 2024-07-30 PROCEDURE — 99309 SBSQ NF CARE MODERATE MDM 30: CPT | Performed by: NURSE PRACTITIONER

## 2024-07-30 NOTE — PROGRESS NOTES
[facility] GERIATRIC SERVICES  Chief Complaint   Patient presents with    CHARLEE     [place] Medical Record Number:  [id number]  Place of Service where encounter took place:  [facility] ([facility]) [[id number]]  Code Status:  unknown     HISTORY:      HPI:  Lance Ibrahim  is 79 year old (1945) undergoing physical and occupational therapy.  He is with history of heart failure with preserved ejection fraction, hypertension, dyslipidemia, CKD, gout, BPH, type 2 diabetes and chronic low back pain for which he is folllowed by [facility] and has an implantable spine stimulator ([employer], MRI conditional 4/2024) in place.  Excerpted from records He initially presented to [facility] with acute on chronic, intractable pain with RLE radiculopathy.  For CT concerning for discitis-osteomyelitis at L4-5 with epidural/intraforaminal phlegmon vs abscess at L4-S1 with severe foraminal narrowing. Transferred to [facility] for Neurosurgery evaluation and ID consultation.      Hospitalization on 5/16-5/22/2024 due to severe sepsis related to UTI caused by Klebsiella oxytoca.     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  Acute on chronic back pain with RLE radiculopathy.  Severe spinal stenosis.  --Presented with acute on chronic back pain located on the right side of his back.  Significant tenderness to palpation at approximately the L3-L4 level on the right side with associated RLE radiculopathy. No bladder or bowel incontinence, no saddle anesthesia. Uses a walker for shorter distances and wheelchair for longer distances at baseline. No recent trauma or falls, but did get a trigger point injection by [facility] on 6/14.   *Afebrile since admission, hemodynamically stable.  WBC WNL. CRP 12.4.   *CT lumbar spine as above (review formal report for complete details)   *MRI lumbar spine Discitis osteomyelitis at L4-5. Epidural abscess  in the ventral epidural space, craniocaudal extension of 5 cm (from inferior L4 through inferior S1 level). At L4-5, severe spinal stenosis secondary to the epidural abscess. At L5-S1, moderate spinal stenosis secondary to the epidural abscess.  --Status post IR guided L4-L5 disc aspiration and L4 biopsy. 1 ml of rust colored cloudy fluid aspirated from L4-5 disc space. Large core specimen also obtained from L4 interior endplate  --Started on IV vancomycin on 6/19 post IR guided aspiration.    Blood cultures no growth to date.  Intraoperative biopsy, aspirate - no growth to date.  Noted allergies to penicillin, cephalosporins and sulfa.   --Infectious disease followed - Anticipate at least 6 weeks of IV antibiotics.   --Neurosurgery signed off, no surgical intervention.  Continue intravenous vancomycin (6/19).  PICC line placed 6/23.    Added ertapenem [6/24] for gram-negative coverage given recent Klebsiella bacteremia.  Vancomycin until 7/31, Ertapenem until 8/5    plan to repeat MRI in 6 weeks.     Bacteriuria with cultures negative  Recent severe sepsis in the setting of Klebsiella UTI:   Hospitalization on 5/16/2024-5/22/2024 due to severe sepsis related to UTI caused by Klebsiella oxytoca. Patient was on IV Vanco, Levaquin, Cefepime, and Ceftriaxone in the hospital. He was discharged on oral Cefdinir and Levaquin. No current sx of UTI.   -UA with trace blood in urine, few bacteria otherwise unremarkable.   Urine cultures with less than 10,000 mixed fred.  CT A/P on admission negative for renal calculi or hydronephrosis, note left renal cyst.   --On ertapenem as above.    Today he is seen to review VS, and a routine visit.  Recently diagnosed with COVID on 7/18/2024.  He is out of isolation and asymptomatic.   Denies shortness of breath.   He was alert and oriented x 4.  Recent chest x-ray with no acute changes. His pain is controlled with current medications.  He is with a spinal cord stimulator right lower  back in which she reports he charges weekly.  He will continue on IV antibiotics.   A1c 2/20/2024 8.0.  He will continue on weekly labs and daily weights for CHF however he denies this diagnosis.  Weight reviewed he is down 3 pounds over the last week.  His left knee surgery was postponed until infection is clear.  Today he reports he has it scheduled for September 16, 2024.  His  IV Vancomycin was discontinued and he continues on Ertapenum.  Last hemoglobin 11.9      ALLERGIES:Ancef [cefazolin], Cephalexin, Penicillins, and Sulfa antibiotics    PAST MEDICAL HISTORY:   Past Medical History:   Diagnosis Date    Anemia     Arthritis     osteoarthritis knees    BPH     CAD (coronary artery disease)     subtotal occlusion in the small distal LAD     Cardiomyopathy     Cerebral artery occlusion with cerebral infarction     TIA 1993, no residual    Cervicalgia     CHF (congestive heart failure) (H)     Chronic kidney disease     Chronic low back pain     Chronic rhinitis     Gouty arthropathy     hands    Hyperlipidemia     Hypertension     Kidney stone     Nocturia     Obesity     Osteomyelitis (H) 08/2023    Foot    PVD (peripheral vascular disease)     Sleep apnea     doesn't use cpap    TIA (transient ischaemic attack) 1993    Type 2 diabetes mellitus - 11/08/2018    Ureteral stone        PAST SURGICAL HISTORY:   has a past surgical history that includes Diastasis recti repair (1985); Ventral hernia repair NOS (1987); ORIF Shoulder (Left); Colonoscopy (03/09/2013); hernia repair (07/13/2004); Foot surgery (04/2013); Amputate toe(s) (Left, 10/15/2018); Arthroplasty knee (Right, 12/07/2018); Heart Catheterization with Possible Intervention (Left, 03/05/2019); Extracapsular cataract extration with intraocular lens implant (Left, 03/13/2017); Extracapsular cataract extration with intraocular lens implant (Right); Amputate foot (Right, 08/07/2023); Esophagoscopy, gastroscopy, duodenoscopy (EGD), combined (N/A, 04/30/2024);  Endoscopic retrograde cholangiopancreatogram (N/A, 04/30/2024); Spinal Cord Stimulator Implant (04/03/2024); Prostate surgery (02/2024); and IR Disc Aspriation Injection (6/19/2024).    FAMILY HISTORY: family history includes Alcohol/Drug in his paternal grandfather; Cancer in his father; Diabetes in his maternal grandfather; Family History Negative in his mother.    SOCIAL HISTORY:  reports that he quit smoking about 31 years ago. His smoking use included cigarettes. He has never been exposed to tobacco smoke. He has never used smokeless tobacco. He reports that he does not currently use alcohol. He reports that he does not use drugs.    ROS:  Constitutional: Negative for activity change, appetite change, fatigue and fever.   HENT: Negative for congestion.    Respiratory: Negative for cough, shortness of breath and wheezing.    Cardiovascular: Negative for chest pain and leg swelling.   Gastrointestinal: Negative for abdominal distention, abdominal pain, constipation, diarrhea and nausea.   Genitourinary: Negative for dysuria.   Musculoskeletal: positive  for arthralgia. Positive  for back pain.   Skin: Negative for color change and wound. PICC LINE    Neurological: Negative for dizziness.   Psychiatric/Behavioral: Negative for agitation, behavioral problems and confusion.     Physical Exam:  Constitutional:       Appearance: Patient is well-developed.   HENT:      Head: Normocephalic.   Eyes:      Conjunctiva/sclera: Conjunctivae normal.   Neck:      Musculoskeletal: Normal range of motion.   Cardiovascular:      Rate and Rhythm: Normal rate and regular rhythm.      Heart sounds: Normal heart sounds. No murmur.   Pulmonary:      Effort: No respiratory distress.      Breath sounds: Normal breath sounds.   no wheezing or rales.   Abdominal:      General: Bowel sounds are normal. There is no distension.      Palpations: Abdomen is soft.      Tenderness: There is no abdominal tenderness.   Musculoskeletal:        Normal range of motion.  decreased ROM LUE and LLE   Skin:General:        Skin is warm.   Neurological:         Mental Status: Patient is alert and oriented to person, place, and time.   Psychiatric:         Behavior: Behavior normal.     Vitals:BP (!) 150/73   Pulse 91   Temp (!) 96.3  F (35.7  C)   Resp 18   Ht 1.829 m (6')   Wt 86.2 kg (190 lb)   SpO2 96%   BMI 25.77 kg/m   and Body mass index is 25.77 kg/m .    Lab/Diagnostic data:   Recent Results (from the past 240 hour(s))   Creatinine    Collection Time: 07/26/24  8:41 AM   Result Value Ref Range    Creatinine 1.26 (H) 0.67 - 1.17 mg/dL    GFR Estimate 58 (L) >60 mL/min/1.73m2   AST    Collection Time: 07/26/24  8:41 AM   Result Value Ref Range    AST 78 (H) 0 - 45 U/L   CRP inflammation    Collection Time: 07/26/24  8:41 AM   Result Value Ref Range    CRP Inflammation 9.62 (H) <5.00 mg/L   CBC with platelets and differential    Collection Time: 07/26/24  8:41 AM   Result Value Ref Range    WBC Count 4.4 4.0 - 11.0 10e3/uL    RBC Count 4.28 (L) 4.40 - 5.90 10e6/uL    Hemoglobin 11.9 (L) 13.3 - 17.7 g/dL    Hematocrit 39.4 (L) 40.0 - 53.0 %    MCV 92 78 - 100 fL    MCH 27.8 26.5 - 33.0 pg    MCHC 30.2 (L) 31.5 - 36.5 g/dL    RDW 16.6 (H) 10.0 - 15.0 %    Platelet Count 290 150 - 450 10e3/uL    % Neutrophils 58 %    % Lymphocytes 28 %    % Monocytes 9 %    % Eosinophils 3 %    % Basophils 1 %    % Immature Granulocytes 1 %    NRBCs per 100 WBC 0 <1 /100    Absolute Neutrophils 2.6 1.6 - 8.3 10e3/uL    Absolute Lymphocytes 1.2 0.8 - 5.3 10e3/uL    Absolute Monocytes 0.4 0.0 - 1.3 10e3/uL    Absolute Eosinophils 0.1 0.0 - 0.7 10e3/uL    Absolute Basophils 0.0 0.0 - 0.2 10e3/uL    Absolute Immature Granulocytes 0.0 <=0.4 10e3/uL    Absolute NRBCs 0.0 10e3/uL   AST    Collection Time: 07/29/24  9:40 AM   Result Value Ref Range    AST 67 (H) 0 - 45 U/L        MEDICATIONS:  MED REC REQUIRED  Post Medication Reconciliation Status: discharge medications  reconciled, continue medications without change          Review of your medicines            Accurate as of July 30, 2024  7:52 AM. If you have any questions, ask your nurse or doctor.                CONTINUE these medicines which may have CHANGED, or have new prescriptions. If we are uncertain of the size of tablets/capsules you have at home, strength may be listed as something that might have changed.        Dose / Directions   torsemide 20 MG tablet  Commonly known as: DEMADEX  This may have changed: how much to take  Used for: Essential hypertension      Dose: 20 mg  TAKE 1 TABLET DAILY  Quantity: 90 tablet  Refills: 3            CONTINUE these medicines which have NOT CHANGED        Dose / Directions   acetaminophen 325 MG tablet  Commonly known as: TYLENOL  Used for: Acute right-sided low back pain with right-sided sciatica      Dose: 650 mg  Take 2 tablets (650 mg) by mouth every 6 hours as needed for pain (and adjunct with moderate or severe pain or per patient request)  Refills: 0     aspirin 81 MG EC tablet  Used for: Coronary artery disease involving native coronary artery of native heart without angina pectoris      Dose: 81 mg  Take 1 tablet (81 mg) by mouth daily  Refills: 0     colchicine 0.6 MG tablet  Commonly known as: COLCRYS  Used for: Gouty arthropathy      TAKE 1 TABLET DAILY  Quantity: 90 tablet  Refills: 3     diclofenac 1 % topical gel  Commonly known as: VOLTAREN      Dose: 4 g  Apply 4 g topically 4 times daily as needed for moderate pain Left knee  Refills: 0     ertapenem 1 GM vial  Commonly known as: INVanz  Indication: Infection of Bone and Bone Marrow  Used for: Lumbar discitis      Dose: 1 g  Inject 1 g into the vein every 24 hours for 42 doses  Refills: 0     famotidine 20 MG tablet  Commonly known as: PEPCID      Dose: 20 mg  Take 1 tablet (20 mg) by mouth 2 times daily as needed (itching)  Quantity: 30 tablet  Refills: 0     ferrous gluconate 324 (38 Fe) MG tablet  Commonly known  as: FERGON  Used for: Iron deficiency anemia, unspecified iron deficiency anemia type      Dose: 324 mg  Take 1 tablet (324 mg) by mouth daily  Refills: 0     fluticasone 50 MCG/ACT nasal spray  Commonly known as: FLONASE      Dose: 1-2 spray  Spray 1-2 sprays in nostril daily as needed  Refills: 0     gabapentin 100 MG capsule  Commonly known as: NEURONTIN      Dose: 100 mg  Take 100 mg by mouth 3 times daily  Refills: 0     glipiZIDE 2.5 MG 24 hr tablet  Commonly known as: GLUCOTROL XL      Dose: 2.5 mg  Take 2.5 mg by mouth daily  Refills: 0     guaiFENesin 20 mg/mL liquid  Commonly known as: ROBITUSSIN      Dose: 200 mg  Take 200 mg by mouth every 4 hours as needed for cough  Refills: 0     insulin glargine 100 UNIT/ML pen  Commonly known as: LANTUS PEN  Used for: 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  Refills: 0     lactobacillus rhamnosus (GG) capsule      Dose: 2 capsule  Take 2 capsules by mouth daily Noon  Refills: 0     Lidocaine 4 % Patch  Commonly known as: LIDOCARE      Dose: 1 patch  Place 1 patch onto the skin every 24 hours To prevent lidocaine toxicity, patient should be patch free for 12 hrs daily.  Refills: 0     lisinopril 10 MG tablet  Commonly known as: ZESTRIL  Used for: Chronic diastolic heart failure (H)      Dose: 10 mg  Take 1 tablet (10 mg) by mouth every 24 hours  Quantity: 90 tablet  Refills: 3     loperamide 2 MG tablet  Commonly known as: IMODIUM A-D      Dose: 2 mg  Take 2 mg by mouth 4 times daily as needed for diarrhea  Refills: 0     melatonin 5 MG Caps      Dose: 5 mg  Take 5 mg by mouth at bedtime  Refills: 0     methocarbamol 500 MG tablet  Commonly known as: ROBAXIN      Dose: 500 mg  Take 500 mg  by mouth every 8 hours as needed for muscle spasms  Refills: 0     nortriptyline 10 MG capsule  Commonly known as: PAMELOR      Dose: 20 mg  Take 20 mg by mouth nightly as needed  Refills: 0     oxyCODONE 5 MG tablet  Commonly known as: ROXICODONE  Used for: Acute right-sided low back pain with right-sided sciatica      Dose: 2.5-5 mg  Take 0.5-1 tablets (2.5-5 mg) by mouth every 6 hours as needed for moderate to severe pain (2.5 mg for moderate pain, 5 mg for severe pain.)  Quantity: 10 tablet  Refills: 0     polyethylene glycol 17 GM/Dose powder  Commonly known as: MIRALAX  Used for: Chronic low back pain without sciatica, unspecified back pain laterality      Dose: 17 g  Take 17 g by mouth daily as needed for constipation  Quantity: 225 g  Refills: 0     polyethylene glycol-propylene glycol 0.4-0.3 % Soln ophthalmic solution  Commonly known as: SYSTANE ULTRA      Dose: 1 drop  1 drop daily  Refills: 0     Semaglutide 14 MG tablet  Commonly known as: RYBELSUS  Used for: Type 2 diabetes, HbA1c goal < 7% (H)      Dose: 14 mg  Take 14 mg by mouth daily  Quantity: 30 tablet  Refills: 3     senna-docusate 8.6-50 MG tablet  Commonly known as: SENOKOT-S/PERICOLACE      Dose: 1 tablet  Take 1 tablet by mouth 2 times daily as needed for constipation  Refills: 0     sodium bicarbonate 650 MG tablet  Used for: Acidosis      Dose: 1,950 mg  Take 3 tablets (1,950 mg) by mouth 3 times daily  Quantity: 180 tablet  Refills: 1     sodium chloride 0.9 % infusion      Dose: 10 mL  Inject 10 mLs into the vein continuously 24 hours before and after antibiotics  Refills: 0     tretinoin (emollient) 0.05 % Crea cream      Externally apply topically every 8 hours as needed  Refills: 0     Tums 500 MG chewable tablet  Generic drug: calcium carbonate      Dose: 1,000 mg  Take 1,000 mg by mouth 3 times daily as needed for heartburn  Refills: 0     ursodiol 300 MG capsule  Commonly known as: ACTIGALL  Used for: Biliary stricture (H28)       Dose: 300 mg  Take 1 capsule (300 mg) by mouth 2 times daily  Quantity: 60 capsule  Refills: 0              ASSESSMENT/PLAN  Encounter Diagnoses   Name Primary?    Physical deconditioning Yes    Pain management     Chronic systolic congestive heart failure (H)        COVID-positive currently asymptomatic.  He is out of isolation..  Denies shortness of breath change in taste or smell will treat symptoms.    Diarrhea on Imodium 4 times daily as needed    Pain management as needed Tylenol, as needed oxycodone, diclofenac gel, lidocaine patch, methocarbamol,  spinal cord stimulator    Mood disorder on nortriptyline     Physical deconditioning  PT/OT    Gout on colchicine    Bone infection on ertapenem until 8/6/2024, and vancomycin recently discontinued.  follow-up with infectious disease and neurosurgery,  pharmacy to dose antibiotics    Anemia on ferrous gluconate hemoglobin 11.9    Diabetes type 2 continue glipizide XL 2.5 mg daily, glargine 8 units subcu twice daily, semaglutide A1C 6/18/24 was 8.0    Hypertension on torsemide, decreased 7/18/24 to 10 mg po daily per his request., continue lisinopril    Electronically signed by: Darling Valdivia CNP

## 2024-07-30 NOTE — LETTER
7/30/2024      Lance Ibrahim  [address]  [facility] [address]        [facility]  Chief Complaint   [facility]     [place] Medical Record Number:  [id number]  Place of Service where encounter took place:  [facility] ([facility]) [[id number]]  Code Status:  unknown     HISTORY:      HPI:  Lance Ibrahim  is 79 year old (1945) undergoing physical and occupational therapy.  He is with history of heart failure with preserved ejection fraction, hypertension, dyslipidemia, CKD, gout, BPH, type 2 diabetes and chronic low back pain for which he is folllowed by [facility] and has an implantable spine stimulator ([employer], MRI conditional 4/2024) in place.  Excerpted from records He initially presented to [facility] with acute on chronic, intractable pain with RLE radiculopathy.  For CT concerning for discitis-osteomyelitis at L4-5 with epidural/intraforaminal phlegmon vs abscess at L4-S1 with severe foraminal narrowing. Transferred to [facility] for Neurosurgery evaluation and ID consultation.      Hospitalization on 5/16-5/22/2024 due to severe sepsis related to UTI caused by Klebsiella oxytoca.     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  Acute on chronic back pain with RLE radiculopathy.  Severe spinal stenosis.  --Presented with acute on chronic back pain located on the right side of his back.  Significant tenderness to palpation at approximately the L3-L4 level on the right side with associated RLE radiculopathy. No bladder or bowel incontinence, no saddle anesthesia. Uses a walker for shorter distances and wheelchair for longer distances at baseline. No recent trauma or falls, but did get a trigger point injection by [facility] clinic on 6/14.   *Afebrile since admission, hemodynamically stable.  WBC WNL. CRP 12.4.   *CT lumbar spine as above (review formal report for complete  details)   *MRI lumbar spine Discitis osteomyelitis at L4-5. 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 At L4-5, severe spinal stenosis secondary to the epidural abscess. At L5-S1, moderate spinal stenosis secondary to the epidural abscess.  --Status post IR guided L4-L5 disc aspiration and L4 biopsy. 1 ml of rust colored cloudy fluid aspirated from L4-5 disc space. Large core specimen also obtained from L4 interior endplate  --Started on IV vancomycin on 6/19 post IR guided aspiration.    Blood cultures no growth to date.  Intraoperative biopsy, aspirate - no growth to date.  Noted allergies to penicillin, cephalosporins and sulfa.   --Infectious disease followed - Anticipate at least 6 weeks of IV antibiotics.   --Neurosurgery signed off, no surgical intervention.  Continue intravenous vancomycin (6/19).  PICC line placed 6/23.    Added ertapenem [6/24] for gram-negative coverage given recent Klebsiella bacteremia.  Vancomycin until 7/31, Ertapenem until 8/5    plan to repeat MRI in 6 weeks.     Bacteriuria with cultures negative  Recent severe sepsis in the setting of Klebsiella UTI:   Hospitalization on 5/16/2024-5/22/2024 due to severe sepsis related to UTI caused by Klebsiella oxytoca. Patient was on IV Vanco, Levaquin, Cefepime, and Ceftriaxone in the hospital. He was discharged on oral Cefdinir and Levaquin. No current sx of UTI.   -UA with trace blood in urine, few bacteria otherwise unremarkable.   Urine cultures with less than 10,000 mixed fred.  CT A/P on admission negative for renal calculi or hydronephrosis, note left renal cyst.   --On ertapenem as above.    Today he is seen to review VS, and a routine visit.  Recently diagnosed with COVID on 7/18/2024.  He is out of isolation and asymptomatic.   Denies shortness of breath.   He was alert and oriented x 4.  Recent chest x-ray with no acute changes. His pain is  controlled with current medications.  He is with a spinal cord stimulator right lower back in which she reports he charges weekly.  He will continue on IV antibiotics.   A1c 2/20/2024 8.0.  He will continue on weekly labs and daily weights for CHF however he denies this diagnosis.  Weight reviewed he is down 3 pounds over the last week.  His left knee surgery was postponed until infection is clear.  Today he reports he has it scheduled for September 16, 2024.  His  IV Vancomycin was discontinued and he continues on Ertapenum.  Last hemoglobin 11.9      ALLERGIES:Ancef [cefazolin], Cephalexin, Penicillins, and Sulfa antibiotics    PAST MEDICAL HISTORY:   Past Medical History:   Diagnosis Date     Anemia      Arthritis     osteoarthritis knees     BPH      CAD (coronary artery disease)     subtotal occlusion in the small distal LAD      Cardiomyopathy      Cerebral artery occlusion with cerebral infarction     TIA 1993, no residual     Cervicalgia      CHF (congestive heart failure) (H)      Chronic kidney disease      Chronic low back pain      Chronic rhinitis      Gouty arthropathy     hands     Hyperlipidemia      Hypertension      Kidney stone      Nocturia      Obesity      Osteomyelitis (H) 08/2023    Foot     PVD (peripheral vascular disease)      Sleep apnea     doesn't use cpap     TIA (transient ischaemic attack) 1993     Type 2 diabetes mellitus - 11/08/2018     Ureteral stone        PAST SURGICAL HISTORY:   has a past surgical history that includes Diastasis recti repair (1985); Ventral hernia repair NOS (1987); ORIF Shoulder (Left); Colonoscopy (03/09/2013); hernia repair (07/13/2004); Foot surgery (04/2013); Amputate toe(s) (Left, 10/15/2018); Arthroplasty knee (Right, 12/07/2018); Heart Catheterization with Possible Intervention (Left, 03/05/2019); Extracapsular cataract extration with intraocular lens implant (Left, 03/13/2017); Extracapsular cataract extration with intraocular lens implant (Right);  Amputate foot (Right, 08/07/2023); Esophagoscopy, gastroscopy, duodenoscopy (EGD), combined (N/A, 04/30/2024); Endoscopic retrograde cholangiopancreatogram (N/A, 04/30/2024); Spinal Cord Stimulator Implant (04/03/2024); Prostate surgery (02/2024); and IR Disc Aspriation Injection (6/19/2024).    FAMILY HISTORY: family history includes Alcohol/Drug in his paternal grandfather; Cancer in his father; Diabetes in his maternal grandfather; Family History Negative in his mother.    SOCIAL HISTORY:  reports that he quit smoking about 31 years ago. His smoking use included cigarettes. He has never been exposed to tobacco smoke. He has never used smokeless tobacco. He reports that he does not currently use alcohol. He reports that he does not use drugs.    ROS:  Constitutional: Negative for activity change, appetite change, fatigue and fever.   HENT: Negative for congestion.    Respiratory: Negative for cough, shortness of breath and wheezing.    Cardiovascular: Negative for chest pain and leg swelling.   Gastrointestinal: Negative for abdominal distention, abdominal pain, constipation, diarrhea and nausea.   Genitourinary: Negative for dysuria.   Musculoskeletal: positive  for arthralgia. Positive  for back pain.   Skin: Negative for color change and wound. PICC LINE    Neurological: Negative for dizziness.   Psychiatric/Behavioral: Negative for agitation, behavioral problems and confusion.     Physical Exam:  Constitutional:       Appearance: Patient is well-developed.   HENT:      Head: Normocephalic.   Eyes:      Conjunctiva/sclera: Conjunctivae normal.   Neck:      Musculoskeletal: Normal range of motion.   Cardiovascular:      Rate and Rhythm: Normal rate and regular rhythm.      Heart sounds: Normal heart sounds. No murmur.   Pulmonary:      Effort: No respiratory distress.      Breath sounds: Normal breath sounds.   no wheezing or rales.   Abdominal:      General: Bowel sounds are normal. There is no distension.       Palpations: Abdomen is soft.      Tenderness: There is no abdominal tenderness.   Musculoskeletal:       Normal range of motion.  decreased ROM LUE and LLE   Skin:General:        Skin is warm.   Neurological:         Mental Status: Patient is alert and oriented to person, place, and time.   Psychiatric:         Behavior: Behavior normal.     Vitals:BP (!) 150/73   Pulse 91   Temp (!) 96.3  F (35.7  C)   Resp 18   Ht 1.829 m (6')   Wt 86.2 kg (190 lb)   SpO2 96%   BMI 25.77 kg/m   and Body mass index is 25.77 kg/m .    Lab/Diagnostic data:   Recent Results (from the past 240 hour(s))   Creatinine    Collection Time: 07/26/24  8:41 AM   Result Value Ref Range    Creatinine 1.26 (H) 0.67 - 1.17 mg/dL    GFR Estimate 58 (L) >60 mL/min/1.73m2   AST    Collection Time: 07/26/24  8:41 AM   Result Value Ref Range    AST 78 (H) 0 - 45 U/L   CRP inflammation    Collection Time: 07/26/24  8:41 AM   Result Value Ref Range    CRP Inflammation 9.62 (H) <5.00 mg/L   CBC with platelets and differential    Collection Time: 07/26/24  8:41 AM   Result Value Ref Range    WBC Count 4.4 4.0 - 11.0 10e3/uL    RBC Count 4.28 (L) 4.40 - 5.90 10e6/uL    Hemoglobin 11.9 (L) 13.3 - 17.7 g/dL    Hematocrit 39.4 (L) 40.0 - 53.0 %    MCV 92 78 - 100 fL    MCH 27.8 26.5 - 33.0 pg    MCHC 30.2 (L) 31.5 - 36.5 g/dL    RDW 16.6 (H) 10.0 - 15.0 %    Platelet Count 290 150 - 450 10e3/uL    % Neutrophils 58 %    % Lymphocytes 28 %    % Monocytes 9 %    % Eosinophils 3 %    % Basophils 1 %    % Immature Granulocytes 1 %    NRBCs per 100 WBC 0 <1 /100    Absolute Neutrophils 2.6 1.6 - 8.3 10e3/uL    Absolute Lymphocytes 1.2 0.8 - 5.3 10e3/uL    Absolute Monocytes 0.4 0.0 - 1.3 10e3/uL    Absolute Eosinophils 0.1 0.0 - 0.7 10e3/uL    Absolute Basophils 0.0 0.0 - 0.2 10e3/uL    Absolute Immature Granulocytes 0.0 <=0.4 10e3/uL    Absolute NRBCs 0.0 10e3/uL   AST    Collection Time: 07/29/24  9:40 AM   Result Value Ref Range    AST 67 (H) 0 - 45 U/L         MEDICATIONS:  MED REC REQUIRED  Post Medication Reconciliation Status: discharge medications reconciled, continue medications without change          Review of your medicines            Accurate as of July 30, 2024  7:52 AM. If you have any questions, ask your nurse or doctor.                CONTINUE these medicines which may have CHANGED, or have new prescriptions. If we are uncertain of the size of tablets/capsules you have at home, strength may be listed as something that might have changed.        Dose / Directions   torsemide 20 MG tablet  Commonly known as: DEMADEX  This may have changed: how much to take  Used for: Essential hypertension      Dose: 20 mg  TAKE 1 TABLET DAILY  Quantity: 90 tablet  Refills: 3            CONTINUE these medicines which have NOT CHANGED        Dose / Directions   acetaminophen 325 MG tablet  Commonly known as: TYLENOL  Used for: Acute right-sided low back pain with right-sided sciatica      Dose: 650 mg  Take 2 tablets (650 mg) by mouth every 6 hours as needed for pain (and adjunct with moderate or severe pain or per patient request)  Refills: 0     aspirin 81 MG EC tablet  Used for: Coronary artery disease involving native coronary artery of native heart without angina pectoris      Dose: 81 mg  Take 1 tablet (81 mg) by mouth daily  Refills: 0     colchicine 0.6 MG tablet  Commonly known as: COLCRYS  Used for: Gouty arthropathy      TAKE 1 TABLET DAILY  Quantity: 90 tablet  Refills: 3     diclofenac 1 % topical gel  Commonly known as: VOLTAREN      Dose: 4 g  Apply 4 g topically 4 times daily as needed for moderate pain Left knee  Refills: 0     ertapenem 1 GM vial  Commonly known as: INVanz  Indication: Infection of Bone and Bone Marrow  Used for: Lumbar discitis      Dose: 1 g  Inject 1 g into the vein every 24 hours for 42 doses  Refills: 0     famotidine 20 MG tablet  Commonly known as: PEPCID      Dose: 20 mg  Take 1 tablet (20 mg) by mouth 2 times daily as needed  (itching)  Quantity: 30 tablet  Refills: 0     ferrous gluconate 324 (38 Fe) MG tablet  Commonly known as: FERGON  Used for: Iron deficiency anemia, unspecified iron deficiency anemia type      Dose: 324 mg  Take 1 tablet (324 mg) by mouth daily  Refills: 0     fluticasone 50 MCG/ACT nasal spray  Commonly known as: FLONASE      Dose: 1-2 spray  Spray 1-2 sprays in nostril daily as needed  Refills: 0     gabapentin 100 MG capsule  Commonly known as: NEURONTIN      Dose: 100 mg  Take 100 mg by mouth 3 times daily  Refills: 0     glipiZIDE 2.5 MG 24 hr tablet  Commonly known as: GLUCOTROL XL      Dose: 2.5 mg  Take 2.5 mg by mouth daily  Refills: 0     guaiFENesin 20 mg/mL liquid  Commonly known as: ROBITUSSIN      Dose: 200 mg  Take 200 mg by mouth every 4 hours as needed for cough  Refills: 0     insulin glargine 100 UNIT/ML pen  Commonly known as: LANTUS PEN  Used for: 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  Refills: 0     lactobacillus rhamnosus (GG) capsule      Dose: 2 capsule  Take 2 capsules by mouth daily Noon  Refills: 0     Lidocaine 4 % Patch  Commonly known as: LIDOCARE      Dose: 1 patch  Place 1 patch onto the skin every 24 hours To prevent lidocaine toxicity, patient should be patch free for 12 hrs daily.  Refills: 0     lisinopril 10 MG tablet  Commonly known as: ZESTRIL  Used for: Chronic diastolic heart failure (H)      Dose: 10 mg  Take 1 tablet (10 mg) by mouth every 24 hours  Quantity: 90 tablet  Refills: 3     loperamide 2 MG tablet  Commonly known as: IMODIUM A-D      Dose: 2 mg  Take 2 mg by mouth 4 times daily as needed for diarrhea  Refills: 0     melatonin 5 MG Caps      Dose: 5 mg  Take 5 mg by mouth at  bedtime  Refills: 0     methocarbamol 500 MG tablet  Commonly known as: ROBAXIN      Dose: 500 mg  Take 500 mg by mouth every 8 hours as needed for muscle spasms  Refills: 0     nortriptyline 10 MG capsule  Commonly known as: PAMELOR      Dose: 20 mg  Take 20 mg by mouth nightly as needed  Refills: 0     oxyCODONE 5 MG tablet  Commonly known as: ROXICODONE  Used for: Acute right-sided low back pain with right-sided sciatica      Dose: 2.5-5 mg  Take 0.5-1 tablets (2.5-5 mg) by mouth every 6 hours as needed for moderate to severe pain (2.5 mg for moderate pain, 5 mg for severe pain.)  Quantity: 10 tablet  Refills: 0     polyethylene glycol 17 GM/Dose powder  Commonly known as: MIRALAX  Used for: Chronic low back pain without sciatica, unspecified back pain laterality      Dose: 17 g  Take 17 g by mouth daily as needed for constipation  Quantity: 225 g  Refills: 0     polyethylene glycol-propylene glycol 0.4-0.3 % Soln ophthalmic solution  Commonly known as: SYSTANE ULTRA      Dose: 1 drop  1 drop daily  Refills: 0     Semaglutide 14 MG tablet  Commonly known as: RYBELSUS  Used for: Type 2 diabetes, HbA1c goal < 7% (H)      Dose: 14 mg  Take 14 mg by mouth daily  Quantity: 30 tablet  Refills: 3     senna-docusate 8.6-50 MG tablet  Commonly known as: SENOKOT-S/PERICOLACE      Dose: 1 tablet  Take 1 tablet by mouth 2 times daily as needed for constipation  Refills: 0     sodium bicarbonate 650 MG tablet  Used for: Acidosis      Dose: 1,950 mg  Take 3 tablets (1,950 mg) by mouth 3 times daily  Quantity: 180 tablet  Refills: 1     sodium chloride 0.9 % infusion      Dose: 10 mL  Inject 10 mLs into the vein continuously 24 hours before and after antibiotics  Refills: 0     tretinoin (emollient) 0.05 % Crea cream      Externally apply topically every 8 hours as needed  Refills: 0     Tums 500 MG chewable tablet  Generic drug: calcium carbonate      Dose: 1,000 mg  Take 1,000 mg by mouth 3 times daily as needed for  heartburn  Refills: 0     ursodiol 300 MG capsule  Commonly known as: ACTIGALL  Used for: Biliary stricture (H28)      Dose: 300 mg  Take 1 capsule (300 mg) by mouth 2 times daily  Quantity: 60 capsule  Refills: 0              ASSESSMENT/PLAN  Encounter Diagnoses   Name Primary?     Physical deconditioning Yes     Pain management      Chronic systolic congestive heart failure (H)        COVID-positive currently asymptomatic.  He is out of isolation..  Denies shortness of breath change in taste or smell will treat symptoms.    Diarrhea on Imodium 4 times daily as needed    Pain management as needed Tylenol, as needed oxycodone, diclofenac gel, lidocaine patch, methocarbamol,  spinal cord stimulator    Mood disorder on nortriptyline     Physical deconditioning  PT/OT    Gout on colchicine    Bone infection on ertapenem until 8/6/2024, and vancomycin recently discontinued.  follow-up with infectious disease and neurosurgery,  pharmacy to dose antibiotics    Anemia on ferrous gluconate hemoglobin 11.9    Diabetes type 2 continue glipizide XL 2.5 mg daily, glargine 8 units subcu twice daily, semaglutide A1C 6/18/24 was 8.0    Hypertension on torsemide, decreased 7/18/24 to 10 mg po daily per his request., continue lisinopril    Electronically signed by: Darling Valdivia CNP       Sincerely,        Darling Valdivia CNP

## 2024-07-31 ENCOUNTER — LAB REQUISITION (OUTPATIENT)
Dept: LAB | Facility: CLINIC | Age: 79
End: 2024-07-31
Payer: COMMERCIAL

## 2024-07-31 DIAGNOSIS — Z51.81 ENCOUNTER FOR THERAPEUTIC DRUG LEVEL MONITORING: ICD-10-CM

## 2024-08-01 LAB
AST SERPL W P-5'-P-CCNC: 39 U/L (ref 0–45)
BASOPHILS # BLD AUTO: 0 10E3/UL (ref 0–0.2)
BASOPHILS NFR BLD AUTO: 1 %
CREAT SERPL-MCNC: 1.16 MG/DL (ref 0.67–1.17)
CRP SERPL-MCNC: 19.2 MG/L
EGFRCR SERPLBLD CKD-EPI 2021: 64 ML/MIN/1.73M2
EOSINOPHIL # BLD AUTO: 0.2 10E3/UL (ref 0–0.7)
EOSINOPHIL NFR BLD AUTO: 3 %
ERYTHROCYTE [DISTWIDTH] IN BLOOD BY AUTOMATED COUNT: 16.2 % (ref 10–15)
HCT VFR BLD AUTO: 39.9 % (ref 40–53)
HGB BLD-MCNC: 11.7 G/DL (ref 13.3–17.7)
IMM GRANULOCYTES # BLD: 0 10E3/UL
IMM GRANULOCYTES NFR BLD: 1 %
LYMPHOCYTES # BLD AUTO: 1.5 10E3/UL (ref 0.8–5.3)
LYMPHOCYTES NFR BLD AUTO: 24 %
MCH RBC QN AUTO: 27.6 PG (ref 26.5–33)
MCHC RBC AUTO-ENTMCNC: 29.3 G/DL (ref 31.5–36.5)
MCV RBC AUTO: 94 FL (ref 78–100)
MONOCYTES # BLD AUTO: 0.5 10E3/UL (ref 0–1.3)
MONOCYTES NFR BLD AUTO: 7 %
NEUTROPHILS # BLD AUTO: 4.2 10E3/UL (ref 1.6–8.3)
NEUTROPHILS NFR BLD AUTO: 64 %
NRBC # BLD AUTO: 0 10E3/UL
NRBC BLD AUTO-RTO: 0 /100
PLATELET # BLD AUTO: 325 10E3/UL (ref 150–450)
RBC # BLD AUTO: 4.24 10E6/UL (ref 4.4–5.9)
WBC # BLD AUTO: 6.4 10E3/UL (ref 4–11)

## 2024-08-01 PROCEDURE — P9603 ONE-WAY ALLOW PRORATED MILES: HCPCS | Mod: ORL | Performed by: FAMILY MEDICINE

## 2024-08-01 PROCEDURE — 82565 ASSAY OF CREATININE: CPT | Mod: ORL | Performed by: FAMILY MEDICINE

## 2024-08-01 PROCEDURE — 84450 TRANSFERASE (AST) (SGOT): CPT | Mod: ORL | Performed by: FAMILY MEDICINE

## 2024-08-01 PROCEDURE — 36415 COLL VENOUS BLD VENIPUNCTURE: CPT | Mod: ORL | Performed by: FAMILY MEDICINE

## 2024-08-01 PROCEDURE — 86140 C-REACTIVE PROTEIN: CPT | Mod: ORL | Performed by: FAMILY MEDICINE

## 2024-08-01 PROCEDURE — 85025 COMPLETE CBC W/AUTO DIFF WBC: CPT | Mod: ORL | Performed by: FAMILY MEDICINE

## 2024-08-02 ENCOUNTER — TELEPHONE (OUTPATIENT)
Dept: FAMILY MEDICINE | Facility: CLINIC | Age: 79
End: 2024-08-02
Payer: COMMERCIAL

## 2024-08-02 NOTE — TELEPHONE ENCOUNTER
"Patient sent [employer] message with the following:    \"Topic: Non-Medical Question.     I am a patient of Dr Conklin. I have been treated (IV) for a spinal infection since July 3nd. I am currently in [facility] rehab. I will be discharged Aug 6. I will be going back to [facility]. Dr Conklin wants me to have therapy   I am 79 years old and am not able to drive because of a severely arthritic knee.  I spoke to your in home therapy group. They advised that they are at capacity ([facility]) and cannot accept any nee patients. What can be done?\"    Routing to primary care provider/home clinic to further advise.   "

## 2024-08-04 ENCOUNTER — APPOINTMENT (OUTPATIENT)
Dept: RADIOLOGY | Facility: CLINIC | Age: 79
DRG: 464 | End: 2024-08-04
Attending: STUDENT IN AN ORGANIZED HEALTH CARE EDUCATION/TRAINING PROGRAM
Payer: COMMERCIAL

## 2024-08-04 ENCOUNTER — MEDICAL CORRESPONDENCE (OUTPATIENT)
Dept: HEALTH INFORMATION MANAGEMENT | Facility: CLINIC | Age: 79
End: 2024-08-04

## 2024-08-04 ENCOUNTER — APPOINTMENT (OUTPATIENT)
Dept: ULTRASOUND IMAGING | Facility: CLINIC | Age: 79
DRG: 464 | End: 2024-08-04
Attending: STUDENT IN AN ORGANIZED HEALTH CARE EDUCATION/TRAINING PROGRAM
Payer: COMMERCIAL

## 2024-08-04 ENCOUNTER — HOSPITAL ENCOUNTER (INPATIENT)
Facility: CLINIC | Age: 79
LOS: 11 days | Discharge: NURSING, REHAB OR LONG-TERM CARE | DRG: 464 | End: 2024-08-16
Attending: STUDENT IN AN ORGANIZED HEALTH CARE EDUCATION/TRAINING PROGRAM | Admitting: EMERGENCY MEDICINE
Payer: COMMERCIAL

## 2024-08-04 DIAGNOSIS — M54.41 ACUTE RIGHT-SIDED LOW BACK PAIN WITH RIGHT-SIDED SCIATICA: ICD-10-CM

## 2024-08-04 DIAGNOSIS — I25.10 CORONARY ARTERY DISEASE INVOLVING NATIVE CORONARY ARTERY OF NATIVE HEART WITHOUT ANGINA PECTORIS: ICD-10-CM

## 2024-08-04 DIAGNOSIS — M1A.09X0 IDIOPATHIC CHRONIC GOUT OF MULTIPLE SITES WITHOUT TOPHUS: ICD-10-CM

## 2024-08-04 DIAGNOSIS — M00.9 SEPTIC ARTHRITIS OF RIGHT ACROMIOCLAVICULAR JOINT (H): Primary | ICD-10-CM

## 2024-08-04 DIAGNOSIS — M25.511 ACUTE PAIN OF RIGHT SHOULDER: ICD-10-CM

## 2024-08-04 DIAGNOSIS — R50.9 FEVER, UNSPECIFIED FEVER CAUSE: ICD-10-CM

## 2024-08-04 DIAGNOSIS — I10 ESSENTIAL HYPERTENSION: ICD-10-CM

## 2024-08-04 DIAGNOSIS — L89.613 PRESSURE INJURY OF RIGHT HEEL, STAGE 3 (H): ICD-10-CM

## 2024-08-04 LAB
ALBUMIN SERPL BCG-MCNC: 3 G/DL (ref 3.5–5.2)
ALP SERPL-CCNC: 155 U/L (ref 40–150)
ALT SERPL W P-5'-P-CCNC: 19 U/L (ref 0–70)
ANION GAP SERPL CALCULATED.3IONS-SCNC: 11 MMOL/L (ref 7–15)
AST SERPL W P-5'-P-CCNC: 25 U/L (ref 0–45)
BASE EXCESS BLDV CALC-SCNC: 7 MMOL/L (ref -3–3)
BASOPHILS # BLD AUTO: 0 10E3/UL (ref 0–0.2)
BASOPHILS NFR BLD AUTO: 0 %
BILIRUB SERPL-MCNC: 0.3 MG/DL
BUN SERPL-MCNC: 16.5 MG/DL (ref 8–23)
CALCIUM SERPL-MCNC: 9.1 MG/DL (ref 8.8–10.4)
CHLORIDE SERPL-SCNC: 100 MMOL/L (ref 98–107)
CREAT SERPL-MCNC: 1.31 MG/DL (ref 0.67–1.17)
CRP SERPL-MCNC: 142 MG/L
EGFRCR SERPLBLD CKD-EPI 2021: 55 ML/MIN/1.73M2
EOSINOPHIL # BLD AUTO: 0 10E3/UL (ref 0–0.7)
EOSINOPHIL NFR BLD AUTO: 0 %
ERYTHROCYTE [DISTWIDTH] IN BLOOD BY AUTOMATED COUNT: 15.6 % (ref 10–15)
GLUCOSE SERPL-MCNC: 157 MG/DL (ref 70–99)
HCO3 BLDV-SCNC: 31 MMOL/L (ref 21–28)
HCO3 SERPL-SCNC: 28 MMOL/L (ref 22–29)
HCT VFR BLD AUTO: 35.4 % (ref 40–53)
HGB BLD-MCNC: 11.1 G/DL (ref 13.3–17.7)
IMM GRANULOCYTES # BLD: 0.1 10E3/UL
IMM GRANULOCYTES NFR BLD: 1 %
LACTATE SERPL-SCNC: 1.2 MMOL/L (ref 0.7–2)
LYMPHOCYTES # BLD AUTO: 1.5 10E3/UL (ref 0.8–5.3)
LYMPHOCYTES NFR BLD AUTO: 13 %
MCH RBC QN AUTO: 27.3 PG (ref 26.5–33)
MCHC RBC AUTO-ENTMCNC: 31.4 G/DL (ref 31.5–36.5)
MCV RBC AUTO: 87 FL (ref 78–100)
MONOCYTES # BLD AUTO: 1.5 10E3/UL (ref 0–1.3)
MONOCYTES NFR BLD AUTO: 13 %
NEUTROPHILS # BLD AUTO: 8.7 10E3/UL (ref 1.6–8.3)
NEUTROPHILS NFR BLD AUTO: 73 %
NRBC # BLD AUTO: 0 10E3/UL
NRBC BLD AUTO-RTO: 0 /100
O2/TOTAL GAS SETTING VFR VENT: 21 %
OXYHGB MFR BLDV: 19 % (ref 70–75)
PCO2 BLDV: 45 MM HG (ref 40–50)
PH BLDV: 7.45 [PH] (ref 7.32–7.43)
PLATELET # BLD AUTO: 360 10E3/UL (ref 150–450)
PO2 BLDV: 17 MM HG (ref 25–47)
POTASSIUM SERPL-SCNC: 3.7 MMOL/L (ref 3.4–5.3)
PROT SERPL-MCNC: 7.4 G/DL (ref 6.4–8.3)
RBC # BLD AUTO: 4.06 10E6/UL (ref 4.4–5.9)
SAO2 % BLDV: 18.9 % (ref 70–75)
SODIUM SERPL-SCNC: 139 MMOL/L (ref 135–145)
URATE SERPL-MCNC: 9 MG/DL (ref 3.4–7)
WBC # BLD AUTO: 11.8 10E3/UL (ref 4–11)

## 2024-08-04 PROCEDURE — G0378 HOSPITAL OBSERVATION PER HR: HCPCS

## 2024-08-04 PROCEDURE — 82805 BLOOD GASES W/O2 SATURATION: CPT | Performed by: STUDENT IN AN ORGANIZED HEALTH CARE EDUCATION/TRAINING PROGRAM

## 2024-08-04 PROCEDURE — 86140 C-REACTIVE PROTEIN: CPT | Performed by: STUDENT IN AN ORGANIZED HEALTH CARE EDUCATION/TRAINING PROGRAM

## 2024-08-04 PROCEDURE — 87040 BLOOD CULTURE FOR BACTERIA: CPT | Performed by: STUDENT IN AN ORGANIZED HEALTH CARE EDUCATION/TRAINING PROGRAM

## 2024-08-04 PROCEDURE — 83540 ASSAY OF IRON: CPT | Performed by: EMERGENCY MEDICINE

## 2024-08-04 PROCEDURE — 84550 ASSAY OF BLOOD/URIC ACID: CPT | Performed by: STUDENT IN AN ORGANIZED HEALTH CARE EDUCATION/TRAINING PROGRAM

## 2024-08-04 PROCEDURE — 258N000003 HC RX IP 258 OP 636: Performed by: STUDENT IN AN ORGANIZED HEALTH CARE EDUCATION/TRAINING PROGRAM

## 2024-08-04 PROCEDURE — 83605 ASSAY OF LACTIC ACID: CPT | Performed by: STUDENT IN AN ORGANIZED HEALTH CARE EDUCATION/TRAINING PROGRAM

## 2024-08-04 PROCEDURE — 36415 COLL VENOUS BLD VENIPUNCTURE: CPT | Performed by: STUDENT IN AN ORGANIZED HEALTH CARE EDUCATION/TRAINING PROGRAM

## 2024-08-04 PROCEDURE — 96365 THER/PROPH/DIAG IV INF INIT: CPT

## 2024-08-04 PROCEDURE — 82728 ASSAY OF FERRITIN: CPT | Performed by: EMERGENCY MEDICINE

## 2024-08-04 PROCEDURE — 99285 EMERGENCY DEPT VISIT HI MDM: CPT | Mod: 25

## 2024-08-04 PROCEDURE — 85025 COMPLETE CBC W/AUTO DIFF WBC: CPT | Performed by: STUDENT IN AN ORGANIZED HEALTH CARE EDUCATION/TRAINING PROGRAM

## 2024-08-04 PROCEDURE — 93971 EXTREMITY STUDY: CPT | Mod: RT

## 2024-08-04 PROCEDURE — 80053 COMPREHEN METABOLIC PANEL: CPT | Performed by: STUDENT IN AN ORGANIZED HEALTH CARE EDUCATION/TRAINING PROGRAM

## 2024-08-04 PROCEDURE — 250N000011 HC RX IP 250 OP 636: Performed by: STUDENT IN AN ORGANIZED HEALTH CARE EDUCATION/TRAINING PROGRAM

## 2024-08-04 PROCEDURE — 83550 IRON BINDING TEST: CPT | Performed by: EMERGENCY MEDICINE

## 2024-08-04 PROCEDURE — 71046 X-RAY EXAM CHEST 2 VIEWS: CPT

## 2024-08-04 RX ORDER — FLUTICASONE PROPIONATE 50 MCG
1-2 SPRAY, SUSPENSION (ML) NASAL DAILY PRN
Status: DISCONTINUED | OUTPATIENT
Start: 2024-08-04 | End: 2024-08-16 | Stop reason: HOSPADM

## 2024-08-04 RX ORDER — METHOCARBAMOL 500 MG/1
500 TABLET, FILM COATED ORAL EVERY 8 HOURS PRN
Status: DISCONTINUED | OUTPATIENT
Start: 2024-08-04 | End: 2024-08-16 | Stop reason: HOSPADM

## 2024-08-04 RX ORDER — OXYCODONE HYDROCHLORIDE 5 MG/1
2.5-5 TABLET ORAL EVERY 6 HOURS PRN
Qty: 10 TABLET | Refills: 0 | Status: ON HOLD | OUTPATIENT
Start: 2024-08-04 | End: 2024-08-16

## 2024-08-04 RX ORDER — NORTRIPTYLINE HCL 10 MG
20 CAPSULE ORAL
Status: DISCONTINUED | OUTPATIENT
Start: 2024-08-04 | End: 2024-08-16 | Stop reason: HOSPADM

## 2024-08-04 RX ORDER — FAMOTIDINE 20 MG/1
20 TABLET, FILM COATED ORAL 2 TIMES DAILY PRN
Status: DISCONTINUED | OUTPATIENT
Start: 2024-08-04 | End: 2024-08-16 | Stop reason: HOSPADM

## 2024-08-04 RX ORDER — LACTOBACILLUS RHAMNOSUS GG 10B CELL
2 CAPSULE ORAL DAILY
Status: DISCONTINUED | OUTPATIENT
Start: 2024-08-05 | End: 2024-08-16 | Stop reason: HOSPADM

## 2024-08-04 RX ORDER — CALCIUM CARBONATE 500 MG/1
1000 TABLET, CHEWABLE ORAL 3 TIMES DAILY PRN
Status: DISCONTINUED | OUTPATIENT
Start: 2024-08-04 | End: 2024-08-05

## 2024-08-04 RX ORDER — SODIUM BICARBONATE 650 MG/1
1950 TABLET ORAL 3 TIMES DAILY
Status: DISCONTINUED | OUTPATIENT
Start: 2024-08-05 | End: 2024-08-16 | Stop reason: HOSPADM

## 2024-08-04 RX ORDER — COLCHICINE 0.6 MG/1
0.6 TABLET ORAL DAILY
Status: DISCONTINUED | OUTPATIENT
Start: 2024-08-05 | End: 2024-08-11

## 2024-08-04 RX ORDER — VANCOMYCIN 1.75 GRAM/500 ML IN 0.9 % SODIUM CHLORIDE INTRAVENOUS
20 ONCE
Status: COMPLETED | OUTPATIENT
Start: 2024-08-04 | End: 2024-08-05

## 2024-08-04 RX ORDER — ERTAPENEM 1 G/1
1 INJECTION, POWDER, LYOPHILIZED, FOR SOLUTION INTRAMUSCULAR; INTRAVENOUS EVERY 24 HOURS
Status: DISCONTINUED | OUTPATIENT
Start: 2024-08-05 | End: 2024-08-05

## 2024-08-04 RX ORDER — POLYETHYLENE GLYCOL 3350 17 G/17G
17 POWDER, FOR SOLUTION ORAL DAILY
Status: DISCONTINUED | OUTPATIENT
Start: 2024-08-05 | End: 2024-08-05

## 2024-08-04 RX ORDER — GABAPENTIN 300 MG/1
300 CAPSULE ORAL 3 TIMES DAILY
Status: DISCONTINUED | OUTPATIENT
Start: 2024-08-05 | End: 2024-08-16 | Stop reason: HOSPADM

## 2024-08-04 RX ORDER — ASPIRIN 81 MG/1
81 TABLET ORAL DAILY
Status: DISCONTINUED | OUTPATIENT
Start: 2024-08-05 | End: 2024-08-16 | Stop reason: HOSPADM

## 2024-08-04 RX ADMIN — VANCOMYCIN HYDROCHLORIDE 1750 MG: 5 INJECTION, POWDER, LYOPHILIZED, FOR SOLUTION INTRAVENOUS at 22:33

## 2024-08-04 ASSESSMENT — ACTIVITIES OF DAILY LIVING (ADL)
ADLS_ACUITY_SCORE: 36
ADLS_ACUITY_SCORE: 38

## 2024-08-04 ASSESSMENT — COLUMBIA-SUICIDE SEVERITY RATING SCALE - C-SSRS
1. IN THE PAST MONTH, HAVE YOU WISHED YOU WERE DEAD OR WISHED YOU COULD GO TO SLEEP AND NOT WAKE UP?: NO
2. HAVE YOU ACTUALLY HAD ANY THOUGHTS OF KILLING YOURSELF IN THE PAST MONTH?: NO
6. HAVE YOU EVER DONE ANYTHING, STARTED TO DO ANYTHING, OR PREPARED TO DO ANYTHING TO END YOUR LIFE?: NO

## 2024-08-05 LAB
ANION GAP SERPL CALCULATED.3IONS-SCNC: 9 MMOL/L (ref 7–15)
BUN SERPL-MCNC: 16.8 MG/DL (ref 8–23)
CALCIUM SERPL-MCNC: 8.8 MG/DL (ref 8.8–10.4)
CHLORIDE SERPL-SCNC: 103 MMOL/L (ref 98–107)
CREAT SERPL-MCNC: 1.14 MG/DL (ref 0.67–1.17)
EGFRCR SERPLBLD CKD-EPI 2021: 65 ML/MIN/1.73M2
FERRITIN SERPL-MCNC: 252 NG/ML (ref 31–409)
GLUCOSE BLDC GLUCOMTR-MCNC: 100 MG/DL (ref 70–99)
GLUCOSE BLDC GLUCOMTR-MCNC: 137 MG/DL (ref 70–99)
GLUCOSE BLDC GLUCOMTR-MCNC: 146 MG/DL (ref 70–99)
GLUCOSE BLDC GLUCOMTR-MCNC: 154 MG/DL (ref 70–99)
GLUCOSE BLDC GLUCOMTR-MCNC: 163 MG/DL (ref 70–99)
GLUCOSE SERPL-MCNC: 137 MG/DL (ref 70–99)
HCO3 SERPL-SCNC: 27 MMOL/L (ref 22–29)
HOLD SPECIMEN: NORMAL
IRON BINDING CAPACITY (ROCHE): 196 UG/DL (ref 240–430)
IRON SATN MFR SERPL: 9 % (ref 15–46)
IRON SERPL-MCNC: 17 UG/DL (ref 61–157)
LACTATE SERPL-SCNC: 2.4 MMOL/L (ref 0.7–2)
POTASSIUM SERPL-SCNC: 3.6 MMOL/L (ref 3.4–5.3)
SODIUM SERPL-SCNC: 139 MMOL/L (ref 135–145)

## 2024-08-05 PROCEDURE — 82962 GLUCOSE BLOOD TEST: CPT

## 2024-08-05 PROCEDURE — 250N000013 HC RX MED GY IP 250 OP 250 PS 637: Performed by: STUDENT IN AN ORGANIZED HEALTH CARE EDUCATION/TRAINING PROGRAM

## 2024-08-05 PROCEDURE — 99222 1ST HOSP IP/OBS MODERATE 55: CPT | Mod: GC | Performed by: INTERNAL MEDICINE

## 2024-08-05 PROCEDURE — 250N000013 HC RX MED GY IP 250 OP 250 PS 637: Performed by: EMERGENCY MEDICINE

## 2024-08-05 PROCEDURE — 83605 ASSAY OF LACTIC ACID: CPT | Performed by: HOSPITALIST

## 2024-08-05 PROCEDURE — 258N000003 HC RX IP 258 OP 636: Performed by: HOSPITALIST

## 2024-08-05 PROCEDURE — 250N000013 HC RX MED GY IP 250 OP 250 PS 637: Performed by: HOSPITALIST

## 2024-08-05 PROCEDURE — 99222 1ST HOSP IP/OBS MODERATE 55: CPT | Performed by: EMERGENCY MEDICINE

## 2024-08-05 PROCEDURE — 36415 COLL VENOUS BLD VENIPUNCTURE: CPT | Performed by: HOSPITALIST

## 2024-08-05 PROCEDURE — 250N000011 HC RX IP 250 OP 636: Performed by: EMERGENCY MEDICINE

## 2024-08-05 PROCEDURE — 36415 COLL VENOUS BLD VENIPUNCTURE: CPT | Performed by: EMERGENCY MEDICINE

## 2024-08-05 PROCEDURE — 258N000003 HC RX IP 258 OP 636: Performed by: STUDENT IN AN ORGANIZED HEALTH CARE EDUCATION/TRAINING PROGRAM

## 2024-08-05 PROCEDURE — 80048 BASIC METABOLIC PNL TOTAL CA: CPT | Performed by: EMERGENCY MEDICINE

## 2024-08-05 PROCEDURE — 250N000011 HC RX IP 250 OP 636: Performed by: STUDENT IN AN ORGANIZED HEALTH CARE EDUCATION/TRAINING PROGRAM

## 2024-08-05 PROCEDURE — 250N000012 HC RX MED GY IP 250 OP 636 PS 637: Performed by: EMERGENCY MEDICINE

## 2024-08-05 PROCEDURE — 120N000001 HC R&B MED SURG/OB

## 2024-08-05 RX ORDER — HYDROMORPHONE HYDROCHLORIDE 1 MG/ML
0.4 INJECTION, SOLUTION INTRAMUSCULAR; INTRAVENOUS; SUBCUTANEOUS
Status: DISCONTINUED | OUTPATIENT
Start: 2024-08-05 | End: 2024-08-16 | Stop reason: HOSPADM

## 2024-08-05 RX ORDER — NALOXONE HYDROCHLORIDE 0.4 MG/ML
0.2 INJECTION, SOLUTION INTRAMUSCULAR; INTRAVENOUS; SUBCUTANEOUS
Status: DISCONTINUED | OUTPATIENT
Start: 2024-08-05 | End: 2024-08-16 | Stop reason: HOSPADM

## 2024-08-05 RX ORDER — LIDOCAINE 40 MG/G
CREAM TOPICAL
Status: DISCONTINUED | OUTPATIENT
Start: 2024-08-05 | End: 2024-08-16 | Stop reason: HOSPADM

## 2024-08-05 RX ORDER — NALOXONE HYDROCHLORIDE 0.4 MG/ML
0.4 INJECTION, SOLUTION INTRAMUSCULAR; INTRAVENOUS; SUBCUTANEOUS
Status: DISCONTINUED | OUTPATIENT
Start: 2024-08-05 | End: 2024-08-16 | Stop reason: HOSPADM

## 2024-08-05 RX ORDER — CALCIUM CARBONATE 500 MG/1
1000 TABLET, CHEWABLE ORAL 4 TIMES DAILY PRN
Status: DISCONTINUED | OUTPATIENT
Start: 2024-08-05 | End: 2024-08-16 | Stop reason: HOSPADM

## 2024-08-05 RX ORDER — CARBOXYMETHYLCELLULOSE SODIUM 10 MG/ML
1 GEL OPHTHALMIC DAILY
Status: DISCONTINUED | OUTPATIENT
Start: 2024-08-05 | End: 2024-08-09

## 2024-08-05 RX ORDER — AMOXICILLIN 250 MG
2 CAPSULE ORAL 2 TIMES DAILY PRN
Status: DISCONTINUED | OUTPATIENT
Start: 2024-08-05 | End: 2024-08-16 | Stop reason: HOSPADM

## 2024-08-05 RX ORDER — HYDROMORPHONE HYDROCHLORIDE 1 MG/ML
0.2 INJECTION, SOLUTION INTRAMUSCULAR; INTRAVENOUS; SUBCUTANEOUS
Status: DISCONTINUED | OUTPATIENT
Start: 2024-08-05 | End: 2024-08-16 | Stop reason: HOSPADM

## 2024-08-05 RX ORDER — ONDANSETRON 4 MG/1
4 TABLET, ORALLY DISINTEGRATING ORAL EVERY 6 HOURS PRN
Status: DISCONTINUED | OUTPATIENT
Start: 2024-08-05 | End: 2024-08-16 | Stop reason: HOSPADM

## 2024-08-05 RX ORDER — FINASTERIDE 5 MG/1
5 TABLET, FILM COATED ORAL DAILY
COMMUNITY

## 2024-08-05 RX ORDER — VANCOMYCIN HYDROCHLORIDE
1500 EVERY 24 HOURS
Status: DISCONTINUED | OUTPATIENT
Start: 2024-08-05 | End: 2024-08-08

## 2024-08-05 RX ORDER — AMOXICILLIN 250 MG
1 CAPSULE ORAL 2 TIMES DAILY PRN
Status: DISCONTINUED | OUTPATIENT
Start: 2024-08-05 | End: 2024-08-16 | Stop reason: HOSPADM

## 2024-08-05 RX ORDER — OXYCODONE HYDROCHLORIDE 5 MG/1
5 TABLET ORAL EVERY 4 HOURS PRN
Status: DISCONTINUED | OUTPATIENT
Start: 2024-08-05 | End: 2024-08-16 | Stop reason: HOSPADM

## 2024-08-05 RX ORDER — ACETAMINOPHEN 325 MG/1
650 TABLET ORAL EVERY 4 HOURS PRN
Status: DISCONTINUED | OUTPATIENT
Start: 2024-08-05 | End: 2024-08-16 | Stop reason: HOSPADM

## 2024-08-05 RX ORDER — NICOTINE POLACRILEX 4 MG
15-30 LOZENGE BUCCAL
Status: DISCONTINUED | OUTPATIENT
Start: 2024-08-05 | End: 2024-08-16 | Stop reason: HOSPADM

## 2024-08-05 RX ORDER — DEXTROSE MONOHYDRATE 25 G/50ML
25-50 INJECTION, SOLUTION INTRAVENOUS
Status: DISCONTINUED | OUTPATIENT
Start: 2024-08-05 | End: 2024-08-16 | Stop reason: HOSPADM

## 2024-08-05 RX ORDER — LOPERAMIDE HCL 2 MG
2 CAPSULE ORAL 4 TIMES DAILY PRN
Status: DISCONTINUED | OUTPATIENT
Start: 2024-08-05 | End: 2024-08-16 | Stop reason: HOSPADM

## 2024-08-05 RX ORDER — ONDANSETRON 2 MG/ML
4 INJECTION INTRAMUSCULAR; INTRAVENOUS EVERY 6 HOURS PRN
Status: DISCONTINUED | OUTPATIENT
Start: 2024-08-05 | End: 2024-08-16 | Stop reason: HOSPADM

## 2024-08-05 RX ORDER — FINASTERIDE 5 MG/1
5 TABLET, FILM COATED ORAL DAILY
Status: DISCONTINUED | OUTPATIENT
Start: 2024-08-05 | End: 2024-08-16 | Stop reason: HOSPADM

## 2024-08-05 RX ADMIN — SODIUM BICARBONATE 1950 MG: 650 TABLET ORAL at 19:45

## 2024-08-05 RX ADMIN — SODIUM CHLORIDE 1000 ML: 9 INJECTION, SOLUTION INTRAVENOUS at 19:45

## 2024-08-05 RX ADMIN — ASPIRIN 81 MG: 81 TABLET, COATED ORAL at 08:17

## 2024-08-05 RX ADMIN — HYDROMORPHONE HYDROCHLORIDE 0.2 MG: 1 INJECTION, SOLUTION INTRAMUSCULAR; INTRAVENOUS; SUBCUTANEOUS at 04:32

## 2024-08-05 RX ADMIN — HYDROMORPHONE HYDROCHLORIDE 0.4 MG: 1 INJECTION, SOLUTION INTRAMUSCULAR; INTRAVENOUS; SUBCUTANEOUS at 18:15

## 2024-08-05 RX ADMIN — GABAPENTIN 300 MG: 300 CAPSULE ORAL at 09:48

## 2024-08-05 RX ADMIN — VANCOMYCIN HYDROCHLORIDE 1500 MG: 5 INJECTION, POWDER, LYOPHILIZED, FOR SOLUTION INTRAVENOUS at 21:42

## 2024-08-05 RX ADMIN — ERTAPENEM SODIUM 1 G: 1 INJECTION, POWDER, LYOPHILIZED, FOR SOLUTION INTRAMUSCULAR; INTRAVENOUS at 12:46

## 2024-08-05 RX ADMIN — SODIUM BICARBONATE 1950 MG: 650 TABLET ORAL at 13:26

## 2024-08-05 RX ADMIN — INSULIN GLARGINE 8 UNITS: 100 INJECTION, SOLUTION SUBCUTANEOUS at 06:03

## 2024-08-05 RX ADMIN — GABAPENTIN 300 MG: 300 CAPSULE ORAL at 13:26

## 2024-08-05 RX ADMIN — ACETAMINOPHEN 650 MG: 325 TABLET ORAL at 18:49

## 2024-08-05 RX ADMIN — HYDROMORPHONE HYDROCHLORIDE 0.4 MG: 1 INJECTION, SOLUTION INTRAMUSCULAR; INTRAVENOUS; SUBCUTANEOUS at 08:16

## 2024-08-05 RX ADMIN — OXYCODONE HYDROCHLORIDE 5 MG: 5 TABLET ORAL at 17:53

## 2024-08-05 RX ADMIN — GABAPENTIN 300 MG: 300 CAPSULE ORAL at 19:46

## 2024-08-05 RX ADMIN — CARBOXYMETHYLCELLULOSE SODIUM 1 DROP: 10 GEL OPHTHALMIC at 16:59

## 2024-08-05 RX ADMIN — Medication 2 CAPSULE: at 09:48

## 2024-08-05 RX ADMIN — COLCHICINE 0.6 MG: 0.6 TABLET ORAL at 09:48

## 2024-08-05 RX ADMIN — OXYCODONE HYDROCHLORIDE 5 MG: 5 TABLET ORAL at 09:48

## 2024-08-05 RX ADMIN — OXYCODONE HYDROCHLORIDE 5 MG: 5 TABLET ORAL at 04:32

## 2024-08-05 RX ADMIN — FINASTERIDE 5 MG: 5 TABLET, FILM COATED ORAL at 16:59

## 2024-08-05 RX ADMIN — SODIUM BICARBONATE 1950 MG: 650 TABLET ORAL at 09:48

## 2024-08-05 ASSESSMENT — ACTIVITIES OF DAILY LIVING (ADL)
ADLS_ACUITY_SCORE: 40
ADLS_ACUITY_SCORE: 38
ADLS_ACUITY_SCORE: 38
ADLS_ACUITY_SCORE: 40
ADLS_ACUITY_SCORE: 40
ADLS_ACUITY_SCORE: 38
ADLS_ACUITY_SCORE: 43
ADLS_ACUITY_SCORE: 38
ADLS_ACUITY_SCORE: 40
ADLS_ACUITY_SCORE: 38
ADLS_ACUITY_SCORE: 38
ADLS_ACUITY_SCORE: 40
ADLS_ACUITY_SCORE: 38
ADLS_ACUITY_SCORE: 43
ADLS_ACUITY_SCORE: 40
ADLS_ACUITY_SCORE: 38
ADLS_ACUITY_SCORE: 40
ADLS_ACUITY_SCORE: 40
ADLS_ACUITY_SCORE: 43

## 2024-08-05 NOTE — PHARMACY-VANCOMYCIN DOSING SERVICE
Pharmacy Vancomycin Initial Note  Date of Service 2024  Patient's  1945  79 year old, male    Indication:  septic arthritis     Current estimated CrCl = Estimated Creatinine Clearance: 55.1 mL/min (A) (based on SCr of 1.31 mg/dL (H)).    Creatinine for last 3 days  2024:  9:24 PM Creatinine 1.31 mg/dL    Recent Vancomycin Level(s) for last 3 days  No results found for requested labs within last 3 days.      Vancomycin IV Administrations (past 72 hours)        No vancomycin orders with administrations in past 72 hours.                    Nephrotoxins and other renal medications (From now, onward)      Start     Dose/Rate Route Frequency Ordered Stop    24 2230  vancomycin (VANCOCIN) 1,750 mg in 0.9% NaCl 500 mL intermittent infusion         20 mg/kg × 86.6 kg  250 mL/hr over 2 Hours Intravenous ONCE 24 2211              Contrast Orders - past 72 hours (72h ago, onward)      None      Plan:  Start vancomycin  1750 mg iv x 1  for septic arthritis   Place a new consult If plan to continue inpt     Lilly Linares, EstellaD

## 2024-08-05 NOTE — ED TRIAGE NOTES
[place] [facility]'s been receiving ongoing treatment of osteomyelitis in his low back. Pt began to have non-traumatic R wrist pain two days ago and R shoulder pain over the last day. Pt reports pain is worse with movement. Later today, pt began to have new fever of 102. Was given tylenol around 1800. EMS gave pt 50 mcg fentanyl IN. Pt has PICC to RUE     Triage Assessment (Adult)       Row Name 08/04/24 1956          Triage Assessment    Airway WDL WDL        Respiratory WDL    Respiratory WDL WDL        Skin Circulation/Temperature WDL    Skin Circulation/Temperature WDL WDL        Cardiac WDL    Cardiac WDL X;rhythm     Pulse Rate & Regularity tachycardic        Peripheral/Neurovascular WDL    Peripheral Neurovascular WDL WDL        Cognitive/Neuro/Behavioral WDL    Cognitive/Neuro/Behavioral WDL WDL

## 2024-08-05 NOTE — PHARMACY-VANCOMYCIN DOSING SERVICE
Pharmacy Vancomycin Initial Note  Date of Service 2024  Patient's  1945  79 year old, male    Indication: Bone and Joint Infection    Current estimated CrCl = Estimated Creatinine Clearance: 63.3 mL/min (based on SCr of 1.14 mg/dL).    Creatinine for last 3 days  2024:  9:24 PM Creatinine 1.31 mg/dL  2024:  6:40 AM Creatinine 1.14 mg/dL    Recent Vancomycin Level(s) for last 3 days  No results found for requested labs within last 3 days.      Vancomycin IV Administrations (past 72 hours)                     vancomycin (VANCOCIN) 1,750 mg in 0.9% NaCl 500 mL intermittent infusion (mg) 1,750 mg New Bag 24                    Nephrotoxins and other renal medications (From now, onward)      None            Contrast Orders - past 72 hours (72h ago, onward)      None            [employer] Prediction of Planned Initial Vancomycin Regimen  Regimen: 1500 mg IV every 24 hours.  Start time: 10:33 on 2024  Exposure target: AUC24 (range)400-600 mg/L.hr   AUC24,ss: 545 mg/L.hr  Probability of AUC24 > 400: 81 %  Ctrough,ss: 16.3 mg/L  Probability of Ctrough,ss > 20: 33 %  Probability of nephrotoxicity (Lodise LATISHA ): 12 %          Plan:  Start vancomycin  1500 mg IV q24h.   Vancomycin monitoring method: AUC  Vancomycin therapeutic monitoring goal: 400-600 mg*h/L  Pharmacy will check vancomycin levels as appropriate in 1-3 Days.    Serum creatinine levels will be ordered daily for the first week of therapy and at least twice weekly for subsequent weeks.      Fabian Miller, [facility]

## 2024-08-05 NOTE — PROGRESS NOTES
Consultation - INFECTIOUS DISEASE CONSULTATION  Lance Ibrahim ,  1945, MRN [id number]      Acute pain of right shoulder  Fever, unspecified fever cause    PCP: Rickey Adamson, [phone]   Code status:  Full Code               Assessment:    Comorbid conditions affecting immune system: Type II Diabetes mellitus   Active Problems:    Acute pain of right shoulder    Fever, unspecified fever cause    Right Shoulder Pain  Concern for right shoulder septic Arthritis  -CRP elevated on admission 142.WBC 11.8 on admission  -Patient was started on IV vancomycin on admission.   -Ertapenem was continued on admission  -Patient has spinal stimulator in place  -Recently treated for L4-L5 discitis/osteomyelitis-culture negative   -Patient was discharged on IV vancomycin and Ertapenem    -Repeat MRI on 2024 showed resolution of intramural abscess  -Hospitalized on 2024-2024 due to severe sepsis from klebsiella oxytoca UTI    Recommendations:   -Continue vancomycin  -Discontinue ertapenem   -Orthopedic team has been consulted, appreciate recommendations  -MRI ordered - pending completion      Spencer Edge DO PGY-3 [facility]       HPI:    Lance Ibrahim is a 79 year old male. History is provided by patient. He reports a sudden onset of right shoulder pain occurring yesterday, which woke him from his sleep. He reports no back pain. He denies fever, chills, nausea, vomiting, shortness of breath, chest pain.       Chief complaint: Active Problems:    Acute pain of right shoulder    Fever, unspecified fever cause      Medical History  Active Ambulatory Problems     Diagnosis Date Noted    Ventral hernia 2004    Chronic rhinitis 2004    Hypertrophy of prostate with urinary obstruction 2004    Transient cerebral ischemia 2004    Hypertension 2008    CARDIOVASCULAR SCREENING; LDL GOAL LESS THAN 100 10/31/2010    Gout 2012    Type 2 diabetes, HbA1c goal <  7% (H) 02/22/2013    Cervicalgia 02/24/2017    Severe sepsis (H) 05/01/2018    Amputated toe, left (H24) 11/08/2018    Type 2 diabetes mellitus with peripheral vascular disease (H) 07/09/2013    Total knee replacement status 12/07/2018    Dyspnea on exertion 02/22/2019    Chest pain, unspecified type 02/22/2019    Cardiomyopathy, unspecified type (H) 02/22/2019    Shortness of breath 03/04/2019    Renal colic 03/04/2019    Obesity (BMI 35.0-39.9) with comorbidity (H) 02/05/2020    Stage 3b chronic kidney disease (H) 02/05/2020    Ulcerated, foot (H) 07/27/2020    Ulcerated, foot, unspecified laterality, limited to breakdown of skin (H) 07/28/2021    Acute renal insufficiency 12/03/2021    Acute right-sided thoracic back pain 12/03/2021    Other constipation 12/09/2021    Acute idiopathic gout of right hand 12/27/2021    H/O amputation of lesser toe, left (H24) 01/26/2022    Lower urinary tract symptoms 04/07/2016    Gouty arthropathy 07/09/2013    Skin rash 12/08/2018    Cellulitis of right lower extremity 08/25/2022    Diabetic ulcer of toe of right foot associated with diabetes mellitus due to underlying condition, limited to breakdown of skin (H) 01/26/2023    Slowing of urinary stream 02/01/2023    Nocturia 02/01/2023    Muscle pain 12/19/2022    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 10/14/2022    Diabetic neuropathy associated with type 2 diabetes mellitus (H) 10/17/2022    Chronic low back pain 01/01/2000    Cellulitis and abscess of foot excluding toe 07/25/2023    Chronic systolic congestive heart failure (H) 08/08/2023    Coronary artery disease involving native coronary artery of native heart without angina pectoris 08/10/2023    Enlarged prostate 04/16/2024    Hyperglycemia 04/26/2024    Elevated LFTs 04/26/2024    Elevated lipase 04/26/2024    Diffuse papular rash 04/26/2024    Abdominal pain 04/30/2024    Hypoxia 05/16/2024    Fall, initial encounter 05/16/2024    Bacteremia 05/19/2024    Acute cystitis without  hematuria 05/19/2024    Back pain 06/18/2024    Cerebrovascular accident (CVA) due to bilateral embolism of posterior cerebral arteries (H) 03/04/2024    Hypertensive heart and kidney disease 03/04/2024    Insomnia 03/04/2024    Iron deficiency anemia 03/04/2024    Osteoarthritis of left knee 03/04/2024    Post-traumatic osteoarthritis 03/04/2024     Resolved Ambulatory Problems     Diagnosis Date Noted    HTN (hypertension) 07/06/2008    Advanced directives, counseling/discussion 12/22/2011    Obesity 10/14/2015    Diabetes mellitus type 2, uncomplicated (H) 10/14/2015    Benign essential hypertension 02/17/2016    Cellulitis 06/19/2018    Diabetic foot infection (H) 10/14/2018    Acute chest pain 02/17/2019    Dyspnea 02/17/2019    Abnormal stress test 03/04/2019    Diabetic ulcer of toe of right foot associated with diabetes mellitus due to underlying condition, with necrosis of bone (H) 07/25/2023    Osteomyelitis of ankle and foot (H) 08/07/2023     Past Medical History:   Diagnosis Date    Anemia     Arthritis     BPH     CAD (coronary artery disease)     Cardiomyopathy     Cerebral artery occlusion with cerebral infarction     CHF (congestive heart failure) (H)     Chronic kidney disease     Hyperlipidemia     Kidney stone     Osteomyelitis (H) 08/2023    PVD (peripheral vascular disease)     Sleep apnea     TIA (transient ischaemic attack) 1993    Ureteral stone          Surgical History  He  has a past surgical history that includes Diastasis recti repair (1985); Ventral hernia repair NOS (1987); ORIF Shoulder (Left); Colonoscopy (03/09/2013); hernia repair (07/13/2004); Foot surgery (04/2013); Amputate toe(s) (Left, 10/15/2018); Arthroplasty knee (Right, 12/07/2018); Heart Catheterization with Possible Intervention (Left, 03/05/2019); Extracapsular cataract extration with intraocular lens implant (Left, 03/13/2017); Extracapsular cataract extration with intraocular lens implant (Right); Amputate foot  "(Right, 08/07/2023); Esophagoscopy, gastroscopy, duodenoscopy (EGD), combined (N/A, 04/30/2024); Endoscopic retrograde cholangiopancreatogram (N/A, 04/30/2024); Spinal Cord Stimulator Implant (04/03/2024); Prostate surgery (02/2024); and IR Disc Aspriation Injection (6/19/2024).       Social History  Reviewed, and he  reports that he quit smoking about 31 years ago. His smoking use included cigarettes. He has never been exposed to tobacco smoke. He has never used smokeless tobacco. He reports that he does not currently use alcohol. He reports that he does not use drugs.        Family History  Reviewed and noncontributory to present problem, and family history includes Alcohol/Drug in his paternal grandfather; Cancer in his father; Diabetes in his maternal grandfather; Family History Negative in his mother. No FH frequent infections   Psychosocial Needs  Social History     Social History Narrative    Not on file     Additional psychosocial needs reviewed per nursing assessment.       Allergies   Allergen Reactions    Ancef [Cefazolin] Rash    Cephalexin Itching and Rash     Allergic reaction required hospitalization 4/2024    Penicillins Anaphylaxis    Sulfa Antibiotics      \"itchy rash, swelling of face and hands\"      (Not in a hospital admission)       Review of Systems:  A 12 point comprehensive review of systems was negative except as noted. Physical Exam:  Temp:  [99  F (37.2  C)] 99  F (37.2  C)  Pulse:  [] 83  Resp:  [16-20] 16  BP: (122-153)/(55-78) 143/69  SpO2:  [86 %-98 %] 90 %    GEN: alert and oriented x3, NAD  HEAD: atraumatic  ENT: moist membranes, no thrush, anicteric sclera, no thrush.   NECK: supple, no nuchal rigidity  CARDIOVASCULAR: regular rate and rhythm, no murmurs, rubs, or gallops  PULMONARY: lungs clear to ausculation bilaterally  ABDOMEN: soft, nontender, nondistended. Normal bowel sounds  SKIN: no rashes or lesions. No stigma of endocarditis  LYMPHADENOPATHY: no cervical, " supraclavicular, axillary, or inguinal lymphadenopathy  PSYCH: grossly intact  MUSCULOSKELETAL: Exquisite tenderness to palpation of right shoulder, anteriorly. Guarding present. ROM of right shoulder limited secondary to guarding/pain, able to passively internal and externally rotate right shoulder.              Pertinent Labs  personally reviewed.   CBC RESULTS:   Recent Labs   Lab Test 08/04/24 2124   WBC 11.8*   RBC 4.06*   HGB 11.1*   HCT 35.4*   MCV 87   MCH 27.3   MCHC 31.4*   RDW 15.6*           Last Comprehensive Metabolic Panel:  Sodium   Date Value Ref Range Status   08/05/2024 139 135 - 145 mmol/L Final   04/29/2021 139 133 - 144 mmol/L Final     Potassium   Date Value Ref Range Status   08/05/2024 3.6 3.4 - 5.3 mmol/L Final   08/27/2022 4.6 3.5 - 5.0 mmol/L Final   04/29/2021 4.7 3.4 - 5.3 mmol/L Final     Chloride   Date Value Ref Range Status   08/05/2024 103 98 - 107 mmol/L Final   08/27/2022 108 (H) 98 - 107 mmol/L Final   04/29/2021 107 94 - 109 mmol/L Final     Carbon Dioxide   Date Value Ref Range Status   04/29/2021 27 20 - 32 mmol/L Final     Carbon Dioxide (CO2)   Date Value Ref Range Status   08/05/2024 27 22 - 29 mmol/L Final   08/27/2022 22 22 - 31 mmol/L Final     Anion Gap   Date Value Ref Range Status   08/05/2024 9 7 - 15 mmol/L Final   08/27/2022 8 5 - 18 mmol/L Final   04/29/2021 5 3 - 14 mmol/L Final     Glucose   Date Value Ref Range Status   08/05/2024 137 (H) 70 - 99 mg/dL Final   08/27/2022 131 (H) 70 - 125 mg/dL Final   04/29/2021 212 (H) 70 - 99 mg/dL Final     GLUCOSE BY METER POCT   Date Value Ref Range Status   08/05/2024 163 (H) 70 - 99 mg/dL Final     Urea Nitrogen   Date Value Ref Range Status   08/05/2024 16.8 8.0 - 23.0 mg/dL Final   08/27/2022 16 8 - 28 mg/dL Final   04/29/2021 27 7 - 30 mg/dL Final     Creatinine   Date Value Ref Range Status   08/05/2024 1.14 0.67 - 1.17 mg/dL Final   04/29/2021 1.19 0.66 - 1.25 mg/dL Final     GFR Estimate   Date Value Ref  Range Status   08/05/2024 65 >60 mL/min/1.73m2 Final     Comment:     eGFR calculated using 2021 CKD-EPI equation.   04/29/2021 59 (L) >60 mL/min/[1.73_m2] Final     Comment:     Non  GFR Calc  Starting 12/18/2018, serum creatinine based estimated GFR (eGFR) will be   calculated using the Chronic Kidney Disease Epidemiology Collaboration   (CKD-EPI) equation.       GFR, ESTIMATED POCT   Date Value Ref Range Status   12/03/2021 15 (L) >60 mL/min/1.73m2 Final     Calcium   Date Value Ref Range Status   08/05/2024 8.8 8.8 - 10.4 mg/dL Final     Comment:     Reference intervals for this test were updated on 7/16/2024 to reflect our healthy population more accurately. There may be differences in the flagging of prior results with similar values performed with this method. Those prior results can be interpreted in the context of the updated reference intervals.   04/29/2021 9.3 8.5 - 10.1 mg/dL Final       CRP Inflammation   Date Value Ref Range Status   07/28/2020 48.3 (H) 0.0 - 8.0 mg/L Final     CRP   Date Value Ref Range Status   08/25/2022 4.7 (H) 0.0 - <0.8 mg/dL Final        The following microbiology studies were personally reviewed:  Culture Micro   Date Value Ref Range Status   07/27/2020 No growth  Final   07/27/2020 No growth  Final   03/01/2019 No growth  Final   10/27/2018 No growth  Final   10/20/2018 No growth  Final   10/20/2018 No growth  Final   10/17/2018 No growth  Final   10/17/2018 No growth  Final   10/15/2018 (A)  Final    Moderate growth  Finegoldia magna (Peptostreptococcus parish)  Susceptibility testing not routinely done     10/15/2018 Moderate growth  Staphylococcus aureus   (A)  Final   10/14/2018 No growth  Final   10/14/2018 No growth  Final   06/19/2018 No growth  Final   06/19/2018 No growth  Final   05/01/2018 No growth  Final   05/01/2018 No growth  Final   05/01/2018 (A)  Final    Light growth  Beta hemolytic Streptococcus group B     05/01/2018 No growth  Final    05/25/2016 No Beta Streptococcus isolated  Final       Urine Studies    Recent Labs   Lab Test 06/17/24  1612 05/16/24  1823 04/28/24  0938 12/03/21  1415 01/24/20  1359   LEUKEST Negative 250 Su[place]/uL* Negative Negative Negative   WBCU 0-5 31* 2 5 0 - 5       Vancomycin Levels    Recent Labs   Lab Test 07/18/24  0935 07/11/24  0702 07/03/24  0840   VANCOMYCIN <4.0 <4.0 28.0*       MICROBIOLOGY DATA:    All cultures:  7-Day Micro Results       Collected Updated Procedure Result Status      08/04/2024 2124 08/05/2024 0947 Blood Culture Peripheral Blood [[id number]]   Peripheral Blood    Preliminary result Component Value   Culture No growth after 12 hours  [P]                         Pertinent Radiology  personally reviewed.       Chest XR,  PA & LAT    Result Date: 8/4/2024  EXAM: XR CHEST 2 VIEWS LOCATION: [facility] DATE: 8/4/2024 INDICATION: intermittent hypoxia COMPARISON: 5/18/2024. FINDINGS: Right PICC with tip at the junction of subclavian vein and SVC. The heart size is normal. The thoracic aorta is calcified. Mild right basilar infiltrate. Atelectasis and scar at the left lung base. There is no pneumothorax or pleural effusion. Degenerative disease in the spine. Spinal stimulator.     IMPRESSION: Mild right basilar atelectasis or pneumonia.    US Upper Extremity Venous Duplex Right    Result Date: 8/4/2024  EXAM: US UPPER EXTREMITY VENOUS DUPLEX RIGHT LOCATION: [facility] DATE: 8/4/2024 INDICATION: RUE pain, PICC line in place COMPARISON: None. TECHNIQUE: Venous Duplex ultrasound of the right upper extremity with (when possible) and without compression, augmentation, and duplex. Color flow and spectral Doppler with waveform analysis performed. FINDINGS: Ultrasound includes evaluation of the internal jugular vein, innominate vein, subclavian vein, and brachial vein. The superficial cephalic and basilic veins were also evaluated where seen. Please note  that the axillary vein was not visualized due to difficulty with patient positioning. RIGHT: No deep venous thrombosis. No superficial thrombophlebitis.     IMPRESSION: 1.  No deep venous thrombosis in the right upper extremity where seen.     ========================  Attestation:  This patient has been seen and evaluated by me, Koko Wolfe MD.  Discussed with the Resident and agree with the findings and recommendations in this note.     I have reviewed today's Medications, Vital Signs, Labs, and Imaging.    78 yo man recently hospitalized at [facility] for lumbar discitis/osteomyelitis. S/p aspiration that was negative on culture. Treated empirically with vancomycin and ertapenem x 6 weeks. Staggered start date, so ertapenem was set to finish today. Repeat CT lumbar spine without residual abscesses. Now admitted with acute onset shoulder pain.    -agree with ortho consult  -empiric vancomycin for now    Will follow    Koko Wolfe MD  [facility]  Direct messaging: [employer] Paging   On-Call ID provider: [phone], option: 9

## 2024-08-05 NOTE — TELEPHONE ENCOUNTER
Called [facility] back to determine if they have the capacity to take patient on, it looks like the patient has been brought in to the ER again on 8/4 and is still there.     [facility] says we should wait to see what the plan is and make sure patient does not need to go back to TCU.     Luisito Romano RN  [facility]

## 2024-08-05 NOTE — ED NOTES
Pt has a spine stimulator and left his remote to turn off the device at the TCU. Pt states all of his family is on vacation and no one is able to retreive the device for him. Unable to proceed with MRI until able to shut the stimulator off. Updated MD and will see what Orthopedics recommend.

## 2024-08-05 NOTE — PLAN OF CARE
This is a brief summary.  Please refer to H&P for further details.  With past medical history significant for HFpEF, diabetes, gout  Mr. Ibrahim is an 79 years old man, recent hospitalization 5/2024 for urosepsis and bacteremia due to Klebsiella.  Eventually diagnosed with L4-L5 discitis and 6/19.  Was placed on IV antibiotic with vancomycin and ertapenem.  On 7/26/2024 lumbar MRI showed resolution of epidural abscess.  He presented to the hospital on 8//2024 with right shoulder pain.    Right shoulder pain  Possible right shoulder septic arthritis  Right shoulder pain started around 2 days ago.-  -WBC mildly elevated.  -Currently on vancomycin and ertapenem.  -Unable to obtain MRI of the right shoulder at this point as the patient has a spinal stimulator and he did not bring his controller with him.    Plan  -Appreciate infectious disease recommendations  -Pending orthopedic surgery evaluation, possible right shoulder aspiration.  If orthopedic surgery recommended proceeding with right shoulder MRI we will see if we can bring his spinal stimulator controller from his facility    Diabetes  -At home on Lantus 8 units twice daily, glipizide 2.5 mg daily, semaglutide 40 mg daily  -Resumed on his home Lantus.  -Fingerstick glucose around 100    Plan  -Decrease Lantus to 6 units twice daily  -Continue to hold glipizide and semaglutide    Acute kidney injury on CKD  -Baseline creatinine around 1.1  -Currently on presentation 1.3  -Creatinine improved back to baseline.    Plan  -No further intervention    Gout    Plan  -Continue home colchicine    HFpEF  -At home on torsemide 20 mg daily  -Appears euvolemic of his examination.    Plan  -Hold torsemide for now    Hypertension  -At home on lisinopril 10 mg daily, torsemide 20 mg daily  -Blood pressures currently around 130/60.  -No indication for strict blood pressure control during this hospitalization.    Plan  -Continue to hold home lisinopril.

## 2024-08-05 NOTE — TELEPHONE ENCOUNTER
Attempted to call [facility] to discuss availability, waiting for call back and will try again.     Luisito Romano RN  [facility]

## 2024-08-05 NOTE — ED PROVIDER NOTES
EMERGENCY DEPARTMENT ENCOUNTER       ED Course & Medical Decision Making     10:30 PM Spoke with Dr Wilson (hospitalist)    Final Impression  79 year old male presents for evaluation of right shoulder and right wrist pain.  Patient reports that he began having some right wrist pain yesterday felt warm, somewhat swollen, worse with movement, then progressed to involve the right shoulder today.  Patient was febrile to 102F at facility, tachycardic upon arrival with heart rate of 115.  Also endorsing other signs of systemic illness such as chills.  No falls, trauma, or other injuries that would have caused acute onset right shoulder and wrist pain.  Does have a history of gout, though states it is only affected his fingers historically, does not think he has ever had in the wrist or shoulder states that he takes colchicine daily and has for about 2 years to help prevent gout. Of note, patient was hospitalized for lumbar discitis and abscess at the L4-L5 level in June/2024, ultimately ruled idiopathic, area was aspirated, had no growth, though he had previously had Klebsiella bacteremia secondary to UTI in May/2024 that may have been the inciting incident.  Following his discharge from [facility] in June/2024 for his discitis/osteomyelitis of L4-L5, was discharged home on IV ertapenem as well as 10 days of IV vancomycin (discharge summary states that he was to be treated until 7/31, though on my discussion with patient sounds like he actually stopped the vancomycin about a month ago, thus something was to change in the interim since he was discharged).  Back pain from his prior discitis/osteomyelitis has been improving, MRI from about a week ago also showed interval improvement, CRP had also been downtrending over that time course.  Bedside ultrasound today does not reveal a significant joint effusion in the right shoulder that I think would be drainable, though on exam he is exquisitely tender along both the  sternal and the AC joint area of the clavicle, worse with range of motion of the right shoulder, also tender with palpation of the right wrist, though does not have any demonstrable erythema.  Given that he developed a fever, chills, and fast heart rate with recent idiopathic patient osteomyelitis/discitis, would favor infectious etiology until proven otherwise.  Had a dose of ertapenem at 1 PM today.  Will restart his IV vancomycin, attempt to obtain blood cultures.  Will also ultrasound right upper extremity for possible DVT, though I think it is low likelihood that a DVT in the right upper extremity where his PICC line is present with less focal pain.  Labs today show new white count of 11.8, previously normal at 6.4 a few days ago.  CRP also markedly elevated 142, had been trended down to 19 about 3 days ago.  Will also obtain chest x-ray as patient is having intermittent hypoxia while in the ED, though does not complain of any chest pain or shortness of breath    Prior to making a final disposition on this patient the results of patient's tests and other diagnostic studies were discussed with the patient. All questions were answered. Patient expressed understanding of the plan and was amenable to it.    Medical Decision Making    History:  External Record(s) reviewed as documented below;  5/16/2024 admission for back pain, bacteremia  6/17/2024 admission for lumbar discitis and osteomyelitis    Work Up:  Chart documentation includes differential considered and any EKGs or imaging independently interpreted by provider, where specified.    External consultation:  Discussion of management with another provider: Hospitalist    Disposition considerations: Admit.    Medications   sodium chloride (PF) 0.9% PF flush 3 mL (has no administration in time range)   sodium chloride (PF) 0.9% PF flush 3 mL (3 mLs Intracatheter $Given 8/4/24 2128)   vancomycin (VANCOCIN) 1,750 mg in 0.9% NaCl 500 mL intermittent infusion  (1,750 mg Intravenous $New Bag 8/4/24 0227)     Final Impression     1. Acute pain of right shoulder    2. Fever, unspecified fever cause      Chief Complaint     Chief Complaint   Patient presents with    Shoulder Pain    Fever     Pt arrived via EMS from [facility] where he's been receiving ongoing treatment of osteomyelitis in his low back. Pt began to have non-traumatic R wrist pain two days ago and R shoulder pain over the last day. Pt reports pain is worse with movement. Later today, pt began to have new fever of 102. Was given tylenol around 1800. EMS gave pt 50 mcg fentanyl IN. Pt has PICC to RUE    HPI     Lance Ibrahim is a 79 year old male who presents for evaluation of right shoulder and wrist pain.    Physical Exam     /62   Pulse 99   Temp 99  F (37.2  C) (Oral)   Resp 18   Ht 1.829 m (6')   Wt 86.6 kg (191 lb)   SpO2 95%   BMI 25.90 kg/m    Constitutional: Awake, alert, in no acute distress.  Head: Normocephalic, atraumatic.  ENT: Mucous membranes moist.  Eyes: Conjunctiva normal.  Respiratory: Respirations even, unlabored, in no acute respiratory distress.  Cardiovascular: Regular rate and rhythm. Good peripheral perfusion.  GI: Abdomen soft, non-tender.  Musculoskeletal: Moves all 4 extremities, though has notable pain with either palpation or range of motion of the right wrist or right shoulder.  No appreciable warmth erythema of the right wrist or shoulder.  Shoulder pain is exquisitely worse at night even gentle palpation along the medial (sternal) or lateral (AC joint) area of the clavicle, though also worse with range of motion of the right shoulder.  Integument: Warm, dry.  PICC line present in right upper extremity, no appreciable swelling of the right upper extremity  Neurologic: Alert & oriented x 3. Normal speech. Grossly normal motor and sensory function. No focal deficits noted.  Psychiatric: Normal mood    Labs & Imaging     Results for orders placed or performed during  the hospital encounter of 08/04/24   Result Value Ref Range    CRP Inflammation 142.00 (H) <5.00 mg/L   Comprehensive metabolic panel   Result Value Ref Range    Sodium 139 135 - 145 mmol/L    Potassium 3.7 3.4 - 5.3 mmol/L    Carbon Dioxide (CO2) 28 22 - 29 mmol/L    Anion Gap 11 7 - 15 mmol/L    Urea Nitrogen 16.5 8.0 - 23.0 mg/dL    Creatinine 1.31 (H) 0.67 - 1.17 mg/dL    GFR Estimate 55 (L) >60 mL/min/1.73m2    Calcium 9.1 8.8 - 10.4 mg/dL    Chloride 100 98 - 107 mmol/L    Glucose 157 (H) 70 - 99 mg/dL    Alkaline Phosphatase 155 (H) 40 - 150 U/L    AST 25 0 - 45 U/L    ALT 19 0 - 70 U/L    Protein Total 7.4 6.4 - 8.3 g/dL    Albumin 3.0 (L) 3.5 - 5.2 g/dL    Bilirubin Total 0.3 <=1.2 mg/dL   Lactic Acid Whole Blood with 1X Repeat in 2 HR when >2   Result Value Ref Range    Lactic Acid, Initial 1.2 0.7 - 2.0 mmol/L   Blood gas venous   Result Value Ref Range    pH Venous 7.45 (H) 7.32 - 7.43    pCO2 Venous 45 40 - 50 mm Hg    pO2 Venous 17 (L) 25 - 47 mm Hg    Bicarbonate Venous 31 (H) 21 - 28 mmol/L    Base Excess/Deficit Venous 7.0 (H) -3.0 - 3.0 mmol/L    FIO2 21     Oxyhemoglobin Venous 19 (L) 70 - 75 %    O2 Sat, Venous 18.9 (L) 70.0 - 75.0 %   Result Value Ref Range    Uric Acid 9.0 (H) 3.4 - 7.0 mg/dL   CBC with platelets and differential   Result Value Ref Range    WBC Count 11.8 (H) 4.0 - 11.0 10e3/uL    RBC Count 4.06 (L) 4.40 - 5.90 10e6/uL    Hemoglobin 11.1 (L) 13.3 - 17.7 g/dL    Hematocrit 35.4 (L) 40.0 - 53.0 %    MCV 87 78 - 100 fL    MCH 27.3 26.5 - 33.0 pg    MCHC 31.4 (L) 31.5 - 36.5 g/dL    RDW 15.6 (H) 10.0 - 15.0 %    Platelet Count 360 150 - 450 10e3/uL    % Neutrophils 73 %    % Lymphocytes 13 %    % Monocytes 13 %    % Eosinophils 0 %    % Basophils 0 %    % Immature Granulocytes 1 %    NRBCs per 100 WBC 0 <1 /100    Absolute Neutrophils 8.7 (H) 1.6 - 8.3 10e3/uL    Absolute Lymphocytes 1.5 0.8 - 5.3 10e3/uL    Absolute Monocytes 1.5 (H) 0.0 - 1.3 10e3/uL    Absolute Eosinophils 0.0  0.0 - 0.7 10e3/uL    Absolute Basophils 0.0 0.0 - 0.2 10e3/uL    Absolute Immature Granulocytes 0.1 <=0.4 10e3/uL    Absolute NRBCs 0.0 10e3/uL        Edwin Smiley MD  08/04/24 [id number]

## 2024-08-05 NOTE — H&P
[facility] MEDICINE ADMISSION HISTORY AND PHYSICAL     Assessment & Plan       79 year old male into [facility] on 8/4/2024 after arriving to the  ER with fever and severe right shoulder pain.  He currently has a  PICC in the RUE receiving ertapenem due to a L4-5 disciitis/  Osteomyelitis diagnosed 6/19/2024.  He completed vancomycin  On 7/10.      PMHx includes urosepsis, L4-5 discitis, osteomyelitis, epidural abscess, diabetes mellitus type 2 non-insulin, HFpEF, CKD, gout    Diagnostics here show a white count of 11.3, hemoglobin of 11.1,  Normal electrolytes and creatinine of 1.3 . The CRP is 142.  (Up from 19 on 8/1).      MIcro history:    5/2024:  hospital stay for urosepsis, bacteremia with klebsiella   Oxytoca  6/19/2024:  L4-5 IR aspirate of disciitis; cultured negative;   Pt put on iv vancomycin for 10 days and ertapenem   To end on 8/5/2024 7/18/2024:  covid positive  7/26/2024: Lumbar MRI showed resolution of epidural abscess    Imaging done in the ER included a negative RUE ultrasound.  XR chest with a likely small RLL infilitrate.      On my interview in the ER at 2300 he is awake alert oriented resting in bed.  He has exquisite pain in the right AC area and his right sternoclavicular area.  He has some mild tenderness to passive range of motion    Patient will be admitted for possible septic arthritis of the right shoulder.  I will resume the vancomycin and continue the ertapenem order an MRI for the morning and involve the infectious disease and orthopedic teams.    Home medication regimen was not complete at time of admission.       Problem list:    Septic arthritis: right shoulder: MRI ordered, continue ertapenem, resume vancomycin, ID consult, orthopedic consult  L4-5 disciitis, osteomyelitis, epidural abscess: improved  HFpEF: Not currently on torsemide, euvolemic  DEAN in patient with CKD: Admission creatinine of 1.31 (up from baseline 1.16  DM2, insulin dependent:  Resume Lantus 8 units twice daily, bolus insulin at meals, hold glipizide and semaglutide during hospital stay  Anemia,normocytic:  add indices  CAP, right: vancomycin, ertapenem   Gout: continue daily colchicine 0.6 mg daily (no flares for  4 years)    8.  BRIANNA: no on CPAP  9 neuropathy: Continue gabapentin 100 mg 3 times daily  10.  DVT prevention: SCD  11.  Disposition: Medically Ready for Discharge:  2-3 days            Chief Complaint Right shoulder pain       Past Medical History     Past Medical History:  No date: Anemia  No date: Arthritis      Comment:  osteoarthritis knees  No date: BPH  No date: CAD (coronary artery disease)      Comment:  subtotal occlusion in the small distal LAD   No date: Cardiomyopathy  No date: Cerebral artery occlusion with cerebral infarction      Comment:  TIA 1993, no residual  No date: Cervicalgia  No date: CHF (congestive heart failure) (H)  No date: Chronic kidney disease  No date: Chronic low back pain  No date: Chronic rhinitis  No date: Gouty arthropathy      Comment:  hands  No date: Hyperlipidemia  No date: Hypertension  No date: Kidney stone  No date: Nocturia  No date: Obesity  08/2023: Osteomyelitis (H)      Comment:  Foot  No date: PVD (peripheral vascular disease)  No date: Sleep apnea      Comment:  doesn't use cpap  1993: TIA (transient ischaemic attack)  11/08/2018: Type 2 diabetes mellitus -  No date: Ureteral stone     Surgical History     Past Surgical History:   Procedure Laterality Date    AMPUTATE FOOT Right 08/07/2023    Procedure: AMPUTATION, right hallux;  Surgeon: Nakul Salazar DPM;  Location: [facility] [place] OR    AMPUTATE TOE(S) Left 10/15/2018    Procedure: AMPUTATE TOE(S);  Left third toe amputation;  Surgeon: Ayaka Azevedo DPM, Podiatry/Foot and Ankle Surgery;  Location:  OR    ARTHROPLASTY KNEE Right 12/07/2018    Procedure: Right total knee arthroplasty;  Surgeon: Issa Cunha MD;  Location:  OR    COLONOSCOPY  03/09/2013     Procedure: COLONOSCOPY;  COLONOSCOPY;  Surgeon: Chau Hogan MD;  Location:  GI    CV HEART CATHETERIZATION WITH POSSIBLE INTERVENTION Left 2019    Procedure: Coronary Angiogram;  Surgeon: Nas Linda MD;  Location:  HEART CARDIAC CATH LAB    Diastasis recti repair      ENDOSCOPIC RETROGRADE CHOLANGIOPANCREATOGRAM N/A 2024    Procedure: ENDOSCOPIC RETROGRADE CHOLANGIOPANCREATOGRAPHY, BILIARY SPHINCTEROTOMY, DILATION, BRUSHING, BIOPSIES with DISTAL EXTRA HEPATIC BILE DUCT STRICTURE;  Surgeon: Aldo Gongora MD;  Location: [facility] [place]    ESOPHAGOSCOPY, GASTROSCOPY, DUODENOSCOPY (EGD), COMBINED N/A 2024    Procedure: ESOPHAGOGASTRODUODENOSCOPY, WITH ENDOSCOPIC ULTRASOUND GUIDANCE;  Surgeon: Aldo Gongora MD;  Location: [facility] [place]    EXTRACAPSULAR CATARACT EXTRATION WITH INTRAOCULAR LENS IMPLANT Left 2017    EXTRACAPSULAR CATARACT EXTRATION WITH INTRAOCULAR LENS IMPLANT Right     FOOT SURGERY  2013    cyst removal     HERNIA REPAIR  2004    ventral     IR DISC ASPIRATION INJECTION  2024    ORIF Shoulder Left     PROSTATE SURGERY  2024    Urolift    SPINAL CORD STIMULATOR IMPLANT  2024    Ventral hernia repair NOS  1987     Family History    Reviewed, and   Family History   Problem Relation Age of Onset    Family History Negative Mother          86 yo    Cancer Father          76 yo brain    Diabetes Maternal Grandfather          89 yo    Alcohol/Drug Paternal Grandfather             Colon Cancer No family hx of         Social History      Social History     Tobacco Use    Smoking status: Former     Current packs/day: 0.00     Types: Cigarettes     Quit date: 3/17/1993     Years since quittin.4     Passive exposure: Never    Smokeless tobacco: Never   Vaping Use    Vaping status: Never Used   Substance Use Topics    Alcohol use: Not Currently    Drug use: No        Allergies     Allergies   Allergen  "Reactions    Ancef [Cefazolin] Rash    Cephalexin Itching and Rash     Allergic reaction required hospitalization 4/2024    Penicillins Anaphylaxis    Sulfa Antibiotics      \"itchy rash, swelling of face and hands\"     Prior to Admission Medications      Prior to Admission Medications   Prescriptions Last Dose Informant Patient Reported? Taking?   Lidocaine (LIDOCARE) 4 % Patch   Yes No   Sig: Place 1 patch onto the skin every 24 hours To prevent lidocaine toxicity, patient should be patch free for 12 hrs daily.   Semaglutide (RYBELSUS) 14 MG tablet   No No   Sig: Take 14 mg by mouth daily   Tretinoin, Facial Wrinkles, (TRETINOIN, EMOLLIENT,) 0.05 % CREA cream   Yes No   Sig: Externally apply topically every 8 hours as needed   acetaminophen (TYLENOL) 325 MG tablet   No No   Sig: Take 2 tablets (650 mg) by mouth every 6 hours as needed for pain (and adjunct with moderate or severe pain or per patient request)   aspirin 81 MG EC tablet   No No   Sig: Take 1 tablet (81 mg) by mouth daily   calcium carbonate (TUMS) 500 MG chewable tablet   Yes No   Sig: Take 1,000 mg by mouth 3 times daily as needed for heartburn   colchicine (COLCYRS) 0.6 MG tablet   No No   Sig: TAKE 1 TABLET DAILY   diclofenac (VOLTAREN) 1 % topical gel   Yes No   Sig: Apply 4 g topically 4 times daily as needed for moderate pain Left knee   ertapenem (INVANZ) 1 GM vial   No No   Sig: Inject 1 g into the vein every 24 hours for 42 doses   famotidine (PEPCID) 20 MG tablet   No No   Sig: Take 1 tablet (20 mg) by mouth 2 times daily as needed (itching)   ferrous gluconate (FERGON) 324 (38 Fe) MG tablet   No No   Sig: Take 1 tablet (324 mg) by mouth daily   fluticasone (FLONASE) 50 MCG/ACT nasal spray   Yes No   Sig: Spray 1-2 sprays in nostril daily as needed   gabapentin (NEURONTIN) 100 MG capsule   Yes No   Sig: Take 100 mg by mouth 3 times daily   glipiZIDE (GLUCOTROL XL) 2.5 MG 24 hr tablet   Yes No   Sig: Take 2.5 mg by mouth daily   guaiFENesin " (ROBITUSSIN) 20 mg/mL liquid   Yes No   Sig: Take 200 mg by mouth every 4 hours as needed for cough   insulin glargine (LANTUS PEN) 100 UNIT/ML pen   No No   Sig: Inject 8 Units Subcutaneous 2 times daily Hold if Blood sugar less than 100.   lactobacillus rhamnosus, GG, (CULTURELL) capsule   Yes No   Sig: Take 2 capsules by mouth daily Noon   lisinopril (ZESTRIL) 10 MG tablet   No No   Sig: Take 1 tablet (10 mg) by mouth every 24 hours   loperamide (IMODIUM A-D) 2 MG tablet   Yes No   Sig: Take 2 mg by mouth 4 times daily as needed for diarrhea   melatonin 5 MG CAPS   Yes No   Sig: Take 5 mg by mouth at bedtime   methocarbamol (ROBAXIN) 500 MG tablet   Yes No   Sig: Take 500 mg by mouth every 8 hours as needed for muscle spasms   nortriptyline (PAMELOR) 10 MG capsule   Yes No   Sig: Take 20 mg by mouth nightly as needed   oxyCODONE (ROXICODONE) 5 MG tablet   No No   Sig: Take 0.5-1 tablets (2.5-5 mg) by mouth every 6 hours as needed for moderate to severe pain (2.5 mg for moderate pain, 5 mg for severe pain.)   polyethylene glycol (MIRALAX) 17 GM/Dose powder   No No   Sig: Take 17 g by mouth daily as needed for constipation   polyethylene glycol-propylene glycol (SYSTANE ULTRA) 0.4-0.3 % SOLN ophthalmic solution   Yes No   Si drop daily   senna-docusate (SENOKOT-S/PERICOLACE) 8.6-50 MG tablet   Yes No   Sig: Take 1 tablet by mouth 2 times daily as needed for constipation   sodium bicarbonate 650 MG tablet   No No   Sig: Take 3 tablets (1,950 mg) by mouth 3 times daily   sodium chloride 0.9% infusion   Yes No   Sig: Inject 10 mLs into the vein continuously 24 hours before and after antibiotics   torsemide (DEMADEX) 20 MG tablet   No No   Sig: TAKE 1 TABLET DAILY   Patient taking differently: Take 10 mg by mouth daily   ursodiol (ACTIGALL) 300 MG capsule   No No   Sig: Take 1 capsule (300 mg) by mouth 2 times daily      Facility-Administered Medications: None      Review of Systems     A 12 point comprehensive  review of systems was negative except as noted above in HPI.    PHYSICAL EXAMINATION       Vitals      Temp:  [99  F (37.2  C)] 99  F (37.2  C)  Pulse:  [] 97  Resp:  [18] 18  BP: (122-147)/(60-78) 145/63  SpO2:  [88 %-98 %] 96 %    Examination     GENERAL:  Alert, appears comfortable, in no acute distress, appears stated age   HEAD:  Normocephalic, without obvious abnormality, atraumatic   EYES:  PERRL, conjunctiva/corneas clear, no scleral icterus, EOM's intact   NOSE: Nares normal, septum midline, mucosa normal, no drainage   THROAT: Lips, mucosa, and tongue normal; teeth and gums normal, mouth moist   NECK: Supple, symmetrical, trachea midline   BACK:   Symmetric, no curvature, ROM normal   LUNGS:   Clear to auscultation bilaterally, no rales, rhonchi, or wheezing, symmetric chest rise on inhalation, respirations unlabored   CHEST WALL:  No tenderness or deformity   HEART:  Regular rate and rhythm, S1 and S2 normal, no murmur, rub, or gallop    ABDOMEN:   Soft, non-tender, bowel sounds active all four quadrants, no masses, no organomegaly, no rebound or guarding   EXTREMITIES: Extremities normal, atraumatic, no cyanosis or edema    SKIN: Dry to touch, no exanthems in the visualized areas   NEURO: Alert, oriented x 4, moves all four extremities freely, non-focal   PSYCH: Cooperative, behavior is appropriate      Pertinent Lab   Most Recent 3 BMP's:  Recent Labs   Lab Test 08/04/24 2124 08/01/24  0835 07/26/24  0841 07/03/24  0840 07/02/24  0626 06/27/24  1106 06/25/24  1139 06/24/24  1101 06/24/24  0739 06/21/24  1259 06/21/24  0905 06/20/24  1212 06/20/24  0829     --   --   --   --   --   --   --  138  --   --   --  136   POTASSIUM 3.7  --   --   --   --   --   --   --  4.5  --  4.1  --  4.5   CHLORIDE 100  --   --   --   --   --   --   --  102  --   --   --  99   CO2 28  --   --   --   --   --   --   --  23  --   --   --  23   BUN 16.5  --   --   --   --   --   --   --  20.5  --   --   --  21.5    CR 1.31* 1.16 1.26*   < > 1.59*   < >  --   --  1.14   < > 1.24*  --  1.17   ANIONGAP 11  --   --   --   --   --   --   --  13  --   --   --  14   SHANNAN 9.1  --   --   --   --   --   --   --  9.0  --   --   --  9.2   *  --   --   --  138*  --  229*   < > 146*   < >  --    < > 172*    < > = values in this interval not displayed.         Pertinent Radiology     Radiology Results:   Recent Results (from the past 24 hour(s))   US Upper Extremity Venous Duplex Right    Narrative    EXAM: US UPPER EXTREMITY VENOUS DUPLEX RIGHT  LOCATION: [facility]  DATE: 8/4/2024    INDICATION: RUE pain, PICC line in place  COMPARISON: None.  TECHNIQUE: Venous Duplex ultrasound of the right upper extremity with (when possible) and without compression, augmentation, and duplex. Color flow and spectral Doppler with waveform analysis performed.    FINDINGS: Ultrasound includes evaluation of the internal jugular vein, innominate vein, subclavian vein, and brachial vein. The superficial cephalic and basilic veins were also evaluated where seen. Please note that the axillary vein was not visualized   due to difficulty with patient positioning.    RIGHT: No deep venous thrombosis. No superficial thrombophlebitis.      Impression    IMPRESSION:  1.  No deep venous thrombosis in the right upper extremity where seen.   Chest XR,  PA & LAT    Narrative    EXAM: XR CHEST 2 VIEWS  LOCATION: [facility]  DATE: 8/4/2024    INDICATION: intermittent hypoxia  COMPARISON: 5/18/2024.    FINDINGS: Right PICC with tip at the junction of subclavian vein and SVC. The heart size is normal. The thoracic aorta is calcified. Mild right basilar infiltrate. Atelectasis and scar at the left lung base. There is no pneumothorax or pleural effusion.   Degenerative disease in the spine. Spinal stimulator.      Impression    IMPRESSION: Mild right basilar atelectasis or pneumonia.            Elizabeth Wilson  MD  [facility]  [facility]  [facility]   Phone: #[phone]

## 2024-08-05 NOTE — PHARMACY-ADMISSION MEDICATION HISTORY
Pharmacist Admission Medication History    Admission medication history is complete. The information provided in this note is only as accurate as the sources available at the time of the update.    Information Source(s): Patient via in-person    Pertinent Information: Patient very against the use of stool softeners and laxatives has had some very bad experiences recently with their use. Only uses Lantus once daily,  semaglutide held due to weight loss and nausea.    Ertapenem due today and tomorrow for a total of 42 days of therapy. Final dose to be given 08/06/24    Changes made to PTA medication list:  Added: finasteride  Deleted: melatonin, miralax,tretinoin, senna/docusate  Changed: Lantus, gabapentin    Allergies reviewed with patient and updates made in EHR: yes    Medication History Completed By: Otilio Katz [facility] 8/5/2024 8:37 AM    PTA Med List   Medication Sig Last Dose    acetaminophen (TYLENOL) 325 MG tablet Take 2 tablets (650 mg) by mouth every 6 hours as needed for pain (and adjunct with moderate or severe pain or per patient request) 8/4/2024 at AM    aspirin 81 MG EC tablet Take 1 tablet (81 mg) by mouth daily 8/4/2024 at AM    calcium carbonate (TUMS) 500 MG chewable tablet Take 1,000 mg by mouth 3 times daily as needed for heartburn 8/4/2024 at AM    colchicine (COLCYRS) 0.6 MG tablet TAKE 1 TABLET DAILY 8/4/2024 at AM    ertapenem (INVANZ) 1 GM vial Inject 1 g into the vein every 24 hours for 42 doses Past Week    ferrous gluconate (FERGON) 324 (38 Fe) MG tablet Take 1 tablet (324 mg) by mouth daily 8/4/2024    finasteride (PROSCAR) 5 MG tablet Take 5 mg by mouth daily 8/4/2024    fluticasone (FLONASE) 50 MCG/ACT nasal spray Spray 1-2 sprays in nostril daily as needed Past Week    gabapentin (NEURONTIN) 300 MG capsule Take 300 mg by mouth 3 times daily 8/4/2024    glipiZIDE (GLUCOTROL XL) 2.5 MG 24 hr tablet Take 2.5 mg by mouth daily 8/4/2024    lactobacillus rhamnosus, GG, (CULTURELL)  capsule Take 2 capsules by mouth daily Noon 8/4/2024    Lidocaine (LIDOCARE) 4 % Patch Place 1 patch onto the skin every 24 hours To prevent lidocaine toxicity, patient should be patch free for 12 hrs daily. Past Week    lisinopril (ZESTRIL) 10 MG tablet Take 1 tablet (10 mg) by mouth every 24 hours 8/4/2024    loperamide (IMODIUM A-D) 2 MG tablet Take 2 mg by mouth 4 times daily as needed for diarrhea 8/4/2024    methocarbamol (ROBAXIN) 500 MG tablet Take 500 mg by mouth every 8 hours as needed for muscle spasms 8/4/2024    nortriptyline (PAMELOR) 10 MG capsule Take 20 mg by mouth nightly as needed 8/4/2024    oxyCODONE (ROXICODONE) 5 MG tablet Take 0.5-1 tablets (2.5-5 mg) by mouth every 6 hours as needed for moderate to severe pain (2.5 mg for moderate pain, 5 mg for severe pain.) 8/4/2024    polyethylene glycol-propylene glycol (SYSTANE ULTRA) 0.4-0.3 % SOLN ophthalmic solution 1 drop daily 8/4/2024    Semaglutide (RYBELSUS) 14 MG tablet Take 14 mg by mouth daily Past Month    sodium bicarbonate 650 MG tablet Take 3 tablets (1,950 mg) by mouth 3 times daily 8/4/2024    torsemide (DEMADEX) 20 MG tablet TAKE 1 TABLET DAILY (Patient taking differently: Take 10 mg by mouth daily) 8/4/2024    ursodiol (ACTIGALL) 300 MG capsule Take 1 capsule (300 mg) by mouth 2 times daily 8/4/2024

## 2024-08-06 ENCOUNTER — APPOINTMENT (OUTPATIENT)
Dept: MRI IMAGING | Facility: CLINIC | Age: 79
DRG: 464 | End: 2024-08-06
Attending: EMERGENCY MEDICINE
Payer: COMMERCIAL

## 2024-08-06 LAB
GLUCOSE BLDC GLUCOMTR-MCNC: 108 MG/DL (ref 70–99)
GLUCOSE BLDC GLUCOMTR-MCNC: 118 MG/DL (ref 70–99)
GLUCOSE BLDC GLUCOMTR-MCNC: 127 MG/DL (ref 70–99)
GLUCOSE BLDC GLUCOMTR-MCNC: 92 MG/DL (ref 70–99)
LACTATE SERPL-SCNC: 0.7 MMOL/L (ref 0.7–2)

## 2024-08-06 PROCEDURE — 120N000001 HC R&B MED SURG/OB

## 2024-08-06 PROCEDURE — 73223 MRI JOINT UPR EXTR W/O&W/DYE: CPT | Mod: RT

## 2024-08-06 PROCEDURE — A9585 GADOBUTROL INJECTION: HCPCS | Performed by: STUDENT IN AN ORGANIZED HEALTH CARE EDUCATION/TRAINING PROGRAM

## 2024-08-06 PROCEDURE — 83605 ASSAY OF LACTIC ACID: CPT | Performed by: STUDENT IN AN ORGANIZED HEALTH CARE EDUCATION/TRAINING PROGRAM

## 2024-08-06 PROCEDURE — 36415 COLL VENOUS BLD VENIPUNCTURE: CPT | Performed by: STUDENT IN AN ORGANIZED HEALTH CARE EDUCATION/TRAINING PROGRAM

## 2024-08-06 PROCEDURE — 250N000013 HC RX MED GY IP 250 OP 250 PS 637: Performed by: EMERGENCY MEDICINE

## 2024-08-06 PROCEDURE — 250N000013 HC RX MED GY IP 250 OP 250 PS 637: Performed by: STUDENT IN AN ORGANIZED HEALTH CARE EDUCATION/TRAINING PROGRAM

## 2024-08-06 PROCEDURE — 250N000011 HC RX IP 250 OP 636: Performed by: STUDENT IN AN ORGANIZED HEALTH CARE EDUCATION/TRAINING PROGRAM

## 2024-08-06 PROCEDURE — 258N000003 HC RX IP 258 OP 636: Performed by: STUDENT IN AN ORGANIZED HEALTH CARE EDUCATION/TRAINING PROGRAM

## 2024-08-06 PROCEDURE — 99233 SBSQ HOSP IP/OBS HIGH 50: CPT | Performed by: STUDENT IN AN ORGANIZED HEALTH CARE EDUCATION/TRAINING PROGRAM

## 2024-08-06 PROCEDURE — 82962 GLUCOSE BLOOD TEST: CPT

## 2024-08-06 PROCEDURE — 255N000002 HC RX 255 OP 636: Performed by: STUDENT IN AN ORGANIZED HEALTH CARE EDUCATION/TRAINING PROGRAM

## 2024-08-06 PROCEDURE — 99232 SBSQ HOSP IP/OBS MODERATE 35: CPT | Performed by: INTERNAL MEDICINE

## 2024-08-06 RX ORDER — LIDOCAINE 4 G/G
2 PATCH TOPICAL
Status: DISCONTINUED | OUTPATIENT
Start: 2024-08-06 | End: 2024-08-16 | Stop reason: HOSPADM

## 2024-08-06 RX ORDER — ENOXAPARIN SODIUM 100 MG/ML
40 INJECTION SUBCUTANEOUS EVERY 24 HOURS
Status: DISCONTINUED | OUTPATIENT
Start: 2024-08-06 | End: 2024-08-16 | Stop reason: HOSPADM

## 2024-08-06 RX ORDER — GADOBUTROL 604.72 MG/ML
8.5 INJECTION INTRAVENOUS ONCE
Status: COMPLETED | OUTPATIENT
Start: 2024-08-06 | End: 2024-08-06

## 2024-08-06 RX ADMIN — GABAPENTIN 300 MG: 300 CAPSULE ORAL at 20:35

## 2024-08-06 RX ADMIN — ASPIRIN 81 MG: 81 TABLET, COATED ORAL at 08:04

## 2024-08-06 RX ADMIN — COLCHICINE 0.6 MG: 0.6 TABLET ORAL at 08:05

## 2024-08-06 RX ADMIN — Medication 2 CAPSULE: at 08:05

## 2024-08-06 RX ADMIN — SODIUM BICARBONATE 1950 MG: 650 TABLET ORAL at 08:04

## 2024-08-06 RX ADMIN — ENOXAPARIN SODIUM 40 MG: 100 INJECTION SUBCUTANEOUS at 13:37

## 2024-08-06 RX ADMIN — LIDOCAINE 2 PATCH: 4 PATCH TOPICAL at 13:37

## 2024-08-06 RX ADMIN — SODIUM BICARBONATE 1950 MG: 650 TABLET ORAL at 13:37

## 2024-08-06 RX ADMIN — SODIUM BICARBONATE 1950 MG: 650 TABLET ORAL at 20:35

## 2024-08-06 RX ADMIN — FINASTERIDE 5 MG: 5 TABLET, FILM COATED ORAL at 08:05

## 2024-08-06 RX ADMIN — OXYCODONE HYDROCHLORIDE 5 MG: 5 TABLET ORAL at 06:47

## 2024-08-06 RX ADMIN — GABAPENTIN 300 MG: 300 CAPSULE ORAL at 08:04

## 2024-08-06 RX ADMIN — VANCOMYCIN HYDROCHLORIDE 1500 MG: 5 INJECTION, POWDER, LYOPHILIZED, FOR SOLUTION INTRAVENOUS at 22:52

## 2024-08-06 RX ADMIN — GADOBUTROL 8.5 ML: 604.72 INJECTION INTRAVENOUS at 21:50

## 2024-08-06 RX ADMIN — METHOCARBAMOL 500 MG: 500 TABLET ORAL at 08:04

## 2024-08-06 RX ADMIN — GABAPENTIN 300 MG: 300 CAPSULE ORAL at 13:37

## 2024-08-06 ASSESSMENT — ACTIVITIES OF DAILY LIVING (ADL)
ADLS_ACUITY_SCORE: 45
ADLS_ACUITY_SCORE: 43
ADLS_ACUITY_SCORE: 43
ADLS_ACUITY_SCORE: 45
ADLS_ACUITY_SCORE: 43
ADLS_ACUITY_SCORE: 43
ADLS_ACUITY_SCORE: 45
ADLS_ACUITY_SCORE: 45
ADLS_ACUITY_SCORE: 46
ADLS_ACUITY_SCORE: 45
ADLS_ACUITY_SCORE: 43
ADLS_ACUITY_SCORE: 45
ADLS_ACUITY_SCORE: 43
ADLS_ACUITY_SCORE: 45
ADLS_ACUITY_SCORE: 43
DEPENDENT_IADLS:: CLEANING;COOKING;LAUNDRY;SHOPPING;MEAL PREPARATION;TRANSPORTATION
ADLS_ACUITY_SCORE: 45
ADLS_ACUITY_SCORE: 46
ADLS_ACUITY_SCORE: 43
ADLS_ACUITY_SCORE: 46
ADLS_ACUITY_SCORE: 45
ADLS_ACUITY_SCORE: 45

## 2024-08-06 NOTE — PLAN OF CARE
Goal Outcome Evaluation:    Problem: Adult Inpatient Plan of Care  Goal: Plan of Care Review  Description: The Plan of Care Review/Shift note should be completed every shift.  The Outcome Evaluation is a brief statement about your assessment that the patient is improving, declining, or no change.  This information will be displayed automatically on your shift  note.  Outcome: Progressing  Flowsheets (Taken 8/6/2024 [id number])  Plan of Care Reviewed With: patient  Overall Patient Progress: improving  VSS on RA. Pt is alert and oriented. Calls appropriately for needs. Assist of 2 pivot to commode, urinal IND in bed.  Pt tolerating 60g carb diet. Single lumen PICC saline locked ACHS glucose checks. Voids spontaneously. Pain managed with lidocaine patches and oral medications.

## 2024-08-06 NOTE — PLAN OF CARE
Goal Outcome Evaluation:       Pt febrile and tachycardiac at beginning of shift. 1L bolus received and symptoms resolved. Lactic came back 0.7 upon recheck. Able to make needs known. Using urinal independently. Reports pain in RUE with any movement. PICC - SL. Plan is for MRI once patient has spinal cord stimulator remote.     [id number]

## 2024-08-06 NOTE — PROGRESS NOTES
[facility]    Medicine Progress Note - Hospitalist Service    Date of Admission:  8/4/2024    Assessment & Plan   Mr. Ibrahim is an 79 years old man with past medical history significant for HFpEF, diabetes, gout recent hospitalization 5/2024 for urosepsis and bacteremia due to Klebsiella.  Eventually diagnosed with L4-L5 discitis and 6/19.  Was placed on IV antibiotic with vancomycin and ertapenem.  On 7/26/2024 lumbar MRI showed resolution of epidural abscess.  He presented to the hospital on 8//2024 with acute right shoulder pain, right wrist pain and right thumb pain.  Working diagnosis septic arthritis versus gout attack. Evaluated by infectious disease.  Continued on vancomycin.  Pending orthopedic surgery evaluation.      Right shoulder pain  Right thumb pain  -Right shoulder pain started around 2 days prior to this hospitalization.  -WBC mildly elevated.  CRP is elevated.  -Over the last 24 hours, patient has been noticing worsening right thumb pain as long with swelling in his right forearm.   -Patient was vancomycin and ertapenem for treatment of discitis. He completed the course of ertapenem on 8/5.  He was kept on vancomycin as per infectious disease for possible septic arthritis.  -Febrile overnight  -Unable to obtain MRI of the right shoulder at this point as the patient has a spinal stimulator and he did not bring his controller with him.  -Working diagnosis gout attack versus septic arthritis.   -Orthopedic surgery consulted on 8/5, the patient has been following up with [facility], therefore [facility] are trying to defer to [facility] for consult.  We are attempting to reach [facility], if they are unable to come today will reach back to [facility] to see if they can evaluate the patient.      Plan  -Appreciate infectious disease recommendations  -Pending orthopedics surgery evaluation, possible arthrocentesis.   -Patient's attempting to bring his spinal stimulator controller in order to  turned off so he can proceed with right shoulder MRI.  -Continue current pain management    Diabetes  -At home on Lantus 8 units twice daily, glipizide 2.5 mg daily, semaglutide 40 mg daily  -Fingerstick glucose around 100     Plan  -Continue Lantus 6 units twice daily  -Continue to hold glipizide and semaglutide     Acute kidney injury on CKD  -Baseline creatinine around 1.1  -Currently on presentation 1.3  -Creatinine improved back to baseline.     Plan  -No further intervention     Gout  Possible gout attack  -Uric acid elevated around 9.  Unclear why he was not on any uric acid lowering agents.  -Patient has significant pain of his right thumb, wrist and right shoulder.  Erythema and warmth of the right forearm and right thumb.  -Possible acute gout attack versus infection.  -Awaiting orthopedic surgery evaluation for possible joints arthrocentesis.  -If the patient has gout attack--> will start systemic steroids given multiple joint involvement.     Plan  -Continue home colchicine  -Would not start any uric acid lowering agents at this point given possible acute gout attack.     HFpEF  -At home on torsemide 20 mg daily  -Appears euvolemic of his examination.     Plan  -Continue to hold torsemide     Hypertension  -At home on lisinopril 10 mg daily, torsemide 20 mg daily  -Blood pressures currently around 140/60.  -No indication for strict blood pressure control during this hospitalization.     Plan  -Continue to hold home lisinopril.          Diet: Moderate Consistent Carb (60 g CHO per Meal) Diet    DVT Prophylaxis: Enoxaparin (Lovenox) SQ  Barnes Catheter: Not present  Lines: PRESENT      PICC 06/23/24 Single Lumen Right Basilic IV ABX, PICC is ready for use-Site Assessment: WDL      Cardiac Monitoring: None  Code Status: Full Code      Clinically Significant Risk Factors              # Hypoalbuminemia: Lowest albumin = 3 g/dL at 8/4/2024  9:24 PM, will monitor as appropriate     # Hypertension: Noted on  problem list  # Chronic heart failure with preserved ejection fraction: heart failure noted on problem list and last echo with EF >50%          # DMII: A1C = 8.0 % (Ref range: <5.7 %) within past 6 months, PRESENT ON ADMISSION  # Overweight: Estimated body mass index is 25.9 kg/m  as calculated from the following:    Height as of this encounter: 1.829 m (6').    Weight as of this encounter: 86.6 kg (191 lb)., PRESENT ON ADMISSION     # Financial/Environmental Concerns:           Disposition Plan     Medically Ready for Discharge: Anticipated in 2-4 Days             MELISA RUEDA MD  Hospitalist Service  [facility]  Securely message with [employer] (more info)  Text page via [employer] Paging/Directory   ______________________________________________________________________    Interval History   Febrile overnight, worsening right shoulder pain, right thumb pain.  Reported some difficulty with moving his neck.  Denies any chest pain.  Denies any shortness of breath.    Physical Exam   Vital Signs: Temp: 98.6  F (37  C) Temp src: Oral BP: (!) 149/76 Pulse: 83   Resp: 13 SpO2: 93 % O2 Device: None (Room air) Oxygen Delivery: 2 LPM  Weight: 191 lbs 0 oz    Physical Exam  Constitutional:       General: He is not in acute distress.  Pulmonary:      Effort: Pulmonary effort is normal.   Musculoskeletal:      Comments: Erythema, warmth, tenderness to palpation of the right thumb.  Worsening edema of the right upper extremity.  No erythema or warmth of the right shoulder, however, severely tender to touch.  Very limited mobility.   Neurological:      Mental Status: He is alert.          Medical Decision Making       60 MINUTES SPENT BY ME on the date of service doing chart review, history, exam, documentation & further activities per the note.      Data     I have personally reviewed the following data over the past 24 hrs:    Procal: N/A CRP: N/A Lactic Acid: 0.7         Imaging results reviewed over the  past 24 hrs:   No results found for this or any previous visit (from the past 24 hour(s)).

## 2024-08-06 NOTE — CONSULTS
[facility] Consultation    Consultation   Level of consult: Consult, follow and place orders    Lance Ibrahim,  1945, MRN [id number]     Admitting Dx: Acute pain of right shoulder  Fever, unspecified fever cause     PCP: Rickey Adamson, [phone]     Code status:  Full Code     Extended Emergency Contact Information  Primary Emergency Contact: Omari Ireland  Address: [address] [place] [address]  Mobile Phone: [phone]  Preferred language: English     Assessment: 79-year-old male with a recent history of lumbar osteomyelitis / discitis now with right shoulder pain, right elbow pain, right hand pain in the setting of fever and chills    Plan:  Differential diagnosis includes cellulitis of the right upper extremity, right shoulder septic arthritis possible other joints involved, cervical spine abscess/discitis/osteomyelitis.  Favor cellulitis at the time given how diffuse his swelling, warmth, and pain are in the right upper extremity    MRI of the shoulder been ordered, recommend including an MRI of the cervical spine given pain down the length of the right upper extremity.  Could obtain an image guided aspiration of the right shoulder now, or after MRI. Would send for cell count, crystals, Gram stain, and culture.  Defer to infectious disease on further evaluation of synovial fluid such as 16s rRNA or other testing given ongoing antibiotics.  Agree with continued antibiotic coverage.    Active Problems:    Acute pain of right shoulder    Fever, unspecified fever cause       Chief Complaint  Right shoulder pain, fever, chills     HPI  We have been requested by Dr Rivas to evaluate Lance Ibrahim who is a 79 year old year old male for right shoulder septic arthritis.  Patient developed 3 days of shoulder pain as well as fever and chills.  CRP is elevated.  He is having difficulty with shoulder movement.  He is also having pain in the elbow and  the hand.  His whole right upper extremity is swollen.  No history of right shoulder surgery.     History is obtained from the patient and the medial record      Past Medical History  Past Medical History:   Diagnosis Date    Anemia     Arthritis     osteoarthritis knees    BPH     CAD (coronary artery disease)     subtotal occlusion in the small distal LAD     Cardiomyopathy     Cerebral artery occlusion with cerebral infarction     TIA 1993, no residual    Cervicalgia     CHF (congestive heart failure) (H)     Chronic kidney disease     Chronic low back pain     Chronic rhinitis     Gouty arthropathy     hands    Hyperlipidemia     Hypertension     Kidney stone     Nocturia     Obesity     Osteomyelitis (H) 08/2023    Foot    PVD (peripheral vascular disease)     Sleep apnea     doesn't use cpap    TIA (transient ischaemic attack) 1993    Type 2 diabetes mellitus - 11/08/2018    Ureteral stone        Surgical History  Past Surgical History:   Procedure Laterality Date    AMPUTATE FOOT Right 08/07/2023    Procedure: AMPUTATION, right hallux;  Surgeon: Nakul Salazar DPM;  Location: [facility] Main OR    AMPUTATE TOE(S) Left 10/15/2018    Procedure: AMPUTATE TOE(S);  Left third toe amputation;  Surgeon: Ayaka Azevedo DPM, Podiatry/Foot and Ankle Surgery;  Location:  OR    ARTHROPLASTY KNEE Right 12/07/2018    Procedure: Right total knee arthroplasty;  Surgeon: Issa Cunha MD;  Location:  OR    COLONOSCOPY  03/09/2013    Procedure: COLONOSCOPY;  COLONOSCOPY;  Surgeon: Chau Hogan MD;  Location:  GI    CV HEART CATHETERIZATION WITH POSSIBLE INTERVENTION Left 03/05/2019    Procedure: Coronary Angiogram;  Surgeon: Nas Linda MD;  Location:  HEART CARDIAC CATH LAB    Diastasis recti repair  1985    ENDOSCOPIC RETROGRADE CHOLANGIOPANCREATOGRAM N/A 04/30/2024    Procedure: ENDOSCOPIC RETROGRADE CHOLANGIOPANCREATOGRAPHY, BILIARY SPHINCTEROTOMY, DILATION, BRUSHING, BIOPSIES with DISTAL  EXTRA HEPATIC BILE DUCT STRICTURE;  Surgeon: Aldo Gongora MD;  Location: [facility] OR    ESOPHAGOSCOPY, GASTROSCOPY, DUODENOSCOPY (EGD), COMBINED N/A 2024    Procedure: ESOPHAGOGASTRODUODENOSCOPY, WITH ENDOSCOPIC ULTRASOUND GUIDANCE;  Surgeon: Aldo Gongora MD;  Location: [facility] OR    EXTRACAPSULAR CATARACT EXTRATION WITH INTRAOCULAR LENS IMPLANT Left 2017    EXTRACAPSULAR CATARACT EXTRATION WITH INTRAOCULAR LENS IMPLANT Right     FOOT SURGERY  2013    cyst removal     HERNIA REPAIR  2004    ventral     IR DISC ASPIRATION INJECTION  2024    ORIF Shoulder Left     PROSTATE SURGERY  2024    Urolift    SPINAL CORD STIMULATOR IMPLANT  2024    Ventral hernia repair NOS  1987        Social History  Social History     Socioeconomic History    Marital status:      Spouse name: Not on file    Number of children: Not on file    Years of education: Not on file    Highest education level: Not on file   Occupational History     Employer: SELF   Tobacco Use    Smoking status: Former     Current packs/day: 0.00     Types: Cigarettes     Quit date: 3/17/1993     Years since quittin.4     Passive exposure: Never    Smokeless tobacco: Never   Vaping Use    Vaping status: Never Used   Substance and Sexual Activity    Alcohol use: Not Currently    Drug use: No    Sexual activity: Never   Other Topics Concern    Parent/sibling w/ CABG, MI or angioplasty before 65F 55M? Not Asked   Social History Narrative    Not on file     Social Determinants of Health     Financial Resource Strain: Not on file   Food Insecurity: Not on file   Transportation Needs: Not on file   Physical Activity: Not on file   Stress: Not on file   Social Connections: Not on file   Interpersonal Safety: Low Risk  (3/27/2024)    Interpersonal Safety     Do you feel physically and emotionally safe where you currently live?: Yes     Within the past 12 months, have you been hit, slapped, kicked or  otherwise physically hurt by someone?: No     Within the past 12 months, have you been humiliated or emotionally abused in other ways by your partner or ex-partner?: No   Housing Stability: Not on file       Family History  Family History   Problem Relation Age of Onset    Family History Negative Mother          88 yo    Cancer Father          76 yo brain    Diabetes Maternal Grandfather          89 yo    Alcohol/Drug Paternal Grandfather             Colon Cancer No family hx of         Allergies:  Ancef [cefazolin], Cephalexin, Penicillins, and Sulfa antibiotics      Current Medications:  Current Facility-Administered Medications   Medication Dose Route Frequency Provider Last Rate Last Admin    acetaminophen (TYLENOL) tablet 650 mg  650 mg Oral Q4H PRN Ramses Tran MD   650 mg at 24 1849    aspirin EC tablet 81 mg  81 mg Oral Daily Elizabeth Wilson MD   81 mg at 24 0804    calcium carbonate (TUMS) chewable tablet 1,000 mg  1,000 mg Oral 4x Daily PRN Elizabeth Wilson MD        carboxymethylcellulose PF (REFRESH LIQUIGEL) 1 % ophthalmic gel 1 drop  1 drop Both Eyes Daily Tyler Rivas MD   1 drop at 24 1659    colchicine (COLCRYS) tablet 0.6 mg  0.6 mg Oral Daily Elizabeth Wilson MD   0.6 mg at 24 0805    glucose gel 15-30 g  15-30 g Oral Q15 Min PRN Elizabeth Wilson MD        Or    dextrose 50 % injection 25-50 mL  25-50 mL Intravenous Q15 Min PRN Elizabeth Wilson MD        famotidine (PEPCID) tablet 20 mg  20 mg Oral BID PRN Elizabeth Wilson MD        finasteride (PROSCAR) tablet 5 mg  5 mg Oral Daily Tyler Rivas MD   5 mg at 24 0805    fluticasone (FLONASE) 50 MCG/ACT spray 1-2 spray  1-2 spray Nasal Daily PRN Elizabeth Wilson MD        gabapentin (NEURONTIN) capsule 300 mg  300 mg Oral TID Elizabeth Wilson MD   300 mg at 24 0804    HYDROmorphone (PF) (DILAUDID) injection 0.2 mg  0.2 mg Intravenous Q2H PRN Steve  Elizabeth MORALES MD   0.2 mg at 08/05/24 0432    HYDROmorphone (PF) (DILAUDID) injection 0.4 mg  0.4 mg Intravenous Q2H PRN Elizabeth Wilson MD   0.4 mg at 08/05/24 1815    insulin aspart (NovoLOG) injection (RAPID ACTING)  1-7 Units Subcutaneous TID AC Elizabeth Wilson MD        insulin aspart (NovoLOG) injection (RAPID ACTING)  1-5 Units Subcutaneous At Bedtime Elizabeth Wilson MD        insulin glargine (LANTUS PEN) injection 6 Units  6 Units Subcutaneous BID Tyler Rivas MD   6 Units at 08/06/24 0805    lactobacillus rhamnosus (GG) (CULTURELL) capsule 2 capsule  2 capsule Oral Daily Elizabeth Wilson MD   2 capsule at 08/06/24 0805    lidocaine (LMX4) cream   Topical Q1H PRN Elizabeth Wilson MD        lidocaine 1 % 0.1-1 mL  0.1-1 mL Other Q1H PRN Elizabeth Wilson MD        loperamide (IMODIUM A-D) half-tab 2 mg  2 mg Oral 4x Daily PRN Tyler Rivas MD        methocarbamol (ROBAXIN) tablet 500 mg  500 mg Oral Q8H PRN Elizabeth Wilson MD   500 mg at 08/06/24 0804    naloxone (NARCAN) injection 0.2 mg  0.2 mg Intravenous Q2 Min PRN Tyler Rivas MD        Or    naloxone (NARCAN) injection 0.4 mg  0.4 mg Intravenous Q2 Min PRN Tyler Rivas MD        Or    naloxone (NARCAN) injection 0.2 mg  0.2 mg Intramuscular Q2 Min PRN Tyler Rivas MD        Or    naloxone (NARCAN) injection 0.4 mg  0.4 mg Intramuscular Q2 Min PRN Tyler Rivas MD        nortriptyline (PAMELOR) capsule 20 mg  20 mg Oral At Bedtime PRN Elizabeth Wilson MD        ondansetron (ZOFRAN ODT) ODT tab 4 mg  4 mg Oral Q6H PRN Elizabeth Wilson MD        Or    ondansetron (ZOFRAN) injection 4 mg  4 mg Intravenous Q6H PRN Elizabeth Wilson MD        oxyCODONE (ROXICODONE) tablet 5 mg  5 mg Oral Q4H PRN Elizabeth Wilson MD   5 mg at 08/06/24 0647    oxyCODONE IR (ROXICODONE) half-tab 2.5 mg  2.5 mg Oral Q4H PRN Elizabeth Wilson MD        senna-docusate (SENOKOT-S/PERICOLACE) 8.6-50 MG per tablet 1 tablet  1 tablet Oral BID PRN  Elizabeth Wilson MD        Or    senna-docusate (SENOKOT-S/PERICOLACE) 8.6-50 MG per tablet 2 tablet  2 tablet Oral BID PRN Elizabeth Wilson MD        sodium bicarbonate tablet 1,950 mg  1,950 mg Oral TID Elizabeth Wilson MD   1,950 mg at 08/06/24 0804    sodium chloride (PF) 0.9% PF flush 3 mL  3 mL Intracatheter Q8H Elizabeth Wilson MD   3 mL at 08/06/24 0806    sodium chloride (PF) 0.9% PF flush 3 mL  3 mL Intracatheter q1 min prn Elizabeth Wilson MD        sodium chloride (PF) 0.9% PF flush 3 mL  3 mL Intracatheter q1 min prn Edwin Smiley MD        sodium chloride (PF) 0.9% PF flush 3 mL  3 mL Intracatheter Q8H Edwin Smiley MD   3 mL at 08/05/24 0603    vancomycin (VANCOCIN) 1,500 mg in 0.9% NaCl 250 mL intermittent infusion  1,500 mg Intravenous Q24H Tyler Rivas MD   Stopped at 08/05/24 2315     Current Outpatient Medications   Medication Sig Dispense Refill    acetaminophen (TYLENOL) 325 MG tablet Take 2 tablets (650 mg) by mouth every 6 hours as needed for pain (and adjunct with moderate or severe pain or per patient request)      aspirin 81 MG EC tablet Take 1 tablet (81 mg) by mouth daily      calcium carbonate (TUMS) 500 MG chewable tablet Take 1,000 mg by mouth 3 times daily as needed for heartburn      colchicine (COLCYRS) 0.6 MG tablet TAKE 1 TABLET DAILY 90 tablet 3    ferrous gluconate (FERGON) 324 (38 Fe) MG tablet Take 1 tablet (324 mg) by mouth daily      finasteride (PROSCAR) 5 MG tablet Take 5 mg by mouth daily      fluticasone (FLONASE) 50 MCG/ACT nasal spray Spray 1-2 sprays in nostril daily as needed      gabapentin (NEURONTIN) 300 MG capsule Take 300 mg by mouth 3 times daily      glipiZIDE (GLUCOTROL XL) 2.5 MG 24 hr tablet Take 2.5 mg by mouth daily      lactobacillus rhamnosus, GG, (CULTURELL) capsule Take 2 capsules by mouth daily Noon      Lidocaine (LIDOCARE) 4 % Patch Place 1 patch onto the skin every 24 hours To prevent lidocaine toxicity, patient should be  patch free for 12 hrs daily.      lisinopril (ZESTRIL) 10 MG tablet Take 1 tablet (10 mg) by mouth every 24 hours 90 tablet 3    loperamide (IMODIUM A-D) 2 MG tablet Take 2 mg by mouth 4 times daily as needed for diarrhea      methocarbamol (ROBAXIN) 500 MG tablet Take 500 mg by mouth every 8 hours as needed for muscle spasms      nortriptyline (PAMELOR) 10 MG capsule Take 20 mg by mouth nightly as needed      oxyCODONE (ROXICODONE) 5 MG tablet Take 0.5-1 tablets (2.5-5 mg) by mouth every 6 hours as needed for moderate to severe pain (2.5 mg for moderate pain, 5 mg for severe pain.) 10 tablet 0    polyethylene glycol-propylene glycol (SYSTANE ULTRA) 0.4-0.3 % SOLN ophthalmic solution 1 drop daily      Semaglutide (RYBELSUS) 14 MG tablet Take 14 mg by mouth daily 30 tablet 3    sodium bicarbonate 650 MG tablet Take 3 tablets (1,950 mg) by mouth 3 times daily 180 tablet 1    torsemide (DEMADEX) 20 MG tablet TAKE 1 TABLET DAILY (Patient taking differently: Take 10 mg by mouth daily) 90 tablet 3    ursodiol (ACTIGALL) 300 MG capsule Take 1 capsule (300 mg) by mouth 2 times daily 60 capsule 0    diclofenac (VOLTAREN) 1 % topical gel Apply 4 g topically 4 times daily as needed for moderate pain Left knee      insulin glargine (LANTUS PEN) 100 UNIT/ML pen Inject 8 Units Subcutaneous 2 times daily Hold if Blood sugar less than 100. (Patient taking differently: Inject 8 Units subcutaneously daily Hold if Blood sugar less than 100.)      sodium chloride 0.9% infusion Inject 10 mLs into the vein continuously 24 hours before and after antibiotics         Review of Systems:  The Review of Systems is negative other than noted in the HPI    Physical Exam:  Temp:  [98  F (36.7  C)-101  F (38.3  C)] 98.6  F (37  C)  Pulse:  [] 83  Resp:  [13-25] 13  BP: (136-169)/(64-76) 149/76  SpO2:  [90 %-97 %] 93 %    Right upper extremity:  Tenderness to the posterior shoulder  Forward elevation is up to over 90 degrees passively but very  limited actively.  Passive internal and external rotation of the shoulder are well-tolerated  Range of motion the elbow is well-tolerated though there is tenderness around the elbow also into the soft tissues of the upper arm and the lower arm  Swelling of the hand with mild tenderness throughout the hand  The right upper extremity is warm throughout from the shoulder down to the hand  Mild pain with neck movement     Pertinent Labs  Lab Results: personally reviewed.  Lab Results   Component Value Date    WBC 11.8 (H) 08/04/2024    HGB 11.1 (L) 08/04/2024    HCT 35.4 (L) 08/04/2024    MCV 87 08/04/2024     08/04/2024          Pertinent Radiology  Radiology Results: images and radiology report reviewed  No results found for this or any previous visit (from the past 24 hour(s)).    Ultrasound of the right upper extremities negative for DVT.    Attestation:  I have reviewed today's vital signs, notes, medications, labs and imaging.  Total time: 45 minutes     Terence Hanson MD

## 2024-08-06 NOTE — PROGRESS NOTES
Infectious Diseases Progress Note  [facility]    Date of visit: 08/06/2024     Assessment:    Comorbid conditions affecting immune system: Type II Diabetes mellitus   Active Problems:    Acute pain of right shoulder    Fever, unspecified fever cause    Right Shoulder Pain  Concern for right shoulder septic Arthritis  -acute onset  -CRP elevated on admission 142.WBC 11.8 on admission  -possible gout flare; now with right thumb pain  -blood culture negative to date  -mri ordered of shoulder, but waiting for spinal stimulator to be turned off.    Recent L4-L5 discitis/osteomyelitis  -s/p aspiration on 6/19 - culture negative  -treated with 6 weeks IV vancomycin and Ertapenem - completed  -Repeat lumbar CT on 7/26/2024 showed resolution of epidural abscess      Recommendations:   -Continue vancomycin  -Orthopedic team has been consulted, appreciate recommendations  -MRI ordered - pending completion      Koko Wolfe MD  [facility]  Direct messaging: [employer] Paging   On-Call ID provider: [phone], option: 9   =====================================    SUBJECTIVE / INTERVAL HISTORY:     Still with shoulder pain this morning. Fever overnight. Now with right hand and thumb pain. No back pain. Says he has pain with moving his neck.        Physical Exam:  Temp:  [98  F (36.7  C)-101  F (38.3  C)] 98.6  F (37  C)  Pulse:  [] 83  Resp:  [13-25] 13  BP: (136-169)/(64-76) 149/76  SpO2:  [90 %-97 %] 93 %     GEN: alert and oriented x3, NAD  HEAD: atraumatic  NECK: supple, pain with movement  CARDIOVASCULAR: regular rate and rhythm, no murmurs, rubs, or gallops  PULMONARY: lungs clear to ausculation bilaterally  ABDOMEN: soft, nontender, nondistended. Normal bowel sounds  SKIN: no rashes or lesions. No stigma of endocarditis  MUSCULOSKELETAL: right shoulder tenderness. Right 1st MCP redness and tenderness    Pertinent Labs  personally reviewed.   CBC RESULTS:   Recent Labs   Lab Test  08/04/24 2124   WBC 11.8*   RBC 4.06*   HGB 11.1*   HCT 35.4*   MCV 87   MCH 27.3   MCHC 31.4*   RDW 15.6*           Last Comprehensive Metabolic Panel:  Sodium   Date Value Ref Range Status   08/05/2024 139 135 - 145 mmol/L Final   04/29/2021 139 133 - 144 mmol/L Final     Potassium   Date Value Ref Range Status   08/05/2024 3.6 3.4 - 5.3 mmol/L Final   08/27/2022 4.6 3.5 - 5.0 mmol/L Final   04/29/2021 4.7 3.4 - 5.3 mmol/L Final     Chloride   Date Value Ref Range Status   08/05/2024 103 98 - 107 mmol/L Final   08/27/2022 108 (H) 98 - 107 mmol/L Final   04/29/2021 107 94 - 109 mmol/L Final     Carbon Dioxide   Date Value Ref Range Status   04/29/2021 27 20 - 32 mmol/L Final     Carbon Dioxide (CO2)   Date Value Ref Range Status   08/05/2024 27 22 - 29 mmol/L Final   08/27/2022 22 22 - 31 mmol/L Final     Anion Gap   Date Value Ref Range Status   08/05/2024 9 7 - 15 mmol/L Final   08/27/2022 8 5 - 18 mmol/L Final   04/29/2021 5 3 - 14 mmol/L Final     Glucose   Date Value Ref Range Status   08/27/2022 131 (H) 70 - 125 mg/dL Final   04/29/2021 212 (H) 70 - 99 mg/dL Final     GLUCOSE BY METER POCT   Date Value Ref Range Status   08/06/2024 108 (H) 70 - 99 mg/dL Final     Urea Nitrogen   Date Value Ref Range Status   08/05/2024 16.8 8.0 - 23.0 mg/dL Final   08/27/2022 16 8 - 28 mg/dL Final   04/29/2021 27 7 - 30 mg/dL Final     Creatinine   Date Value Ref Range Status   08/05/2024 1.14 0.67 - 1.17 mg/dL Final   04/29/2021 1.19 0.66 - 1.25 mg/dL Final     GFR Estimate   Date Value Ref Range Status   08/05/2024 65 >60 mL/min/1.73m2 Final     Comment:     eGFR calculated using 2021 CKD-EPI equation.   04/29/2021 59 (L) >60 mL/min/[1.73_m2] Final     Comment:     Non  GFR Calc  Starting 12/18/2018, serum creatinine based estimated GFR (eGFR) will be   calculated using the Chronic Kidney Disease Epidemiology Collaboration   (CKD-EPI) equation.       GFR, ESTIMATED POCT   Date Value Ref Range Status    12/03/2021 15 (L) >60 mL/min/1.73m2 Final     Calcium   Date Value Ref Range Status   08/05/2024 8.8 8.8 - 10.4 mg/dL Final     Comment:     Reference intervals for this test were updated on 7/16/2024 to reflect our healthy population more accurately. There may be differences in the flagging of prior results with similar values performed with this method. Those prior results can be interpreted in the context of the updated reference intervals.   04/29/2021 9.3 8.5 - 10.1 mg/dL Final       CRP Inflammation   Date Value Ref Range Status   07/28/2020 48.3 (H) 0.0 - 8.0 mg/L Final     CRP   Date Value Ref Range Status   08/25/2022 4.7 (H) 0.0 - <0.8 mg/dL Final        The following microbiology studies were personally reviewed:  Culture Micro   Date Value Ref Range Status   07/27/2020 No growth  Final   07/27/2020 No growth  Final   03/01/2019 No growth  Final   10/27/2018 No growth  Final   10/20/2018 No growth  Final   10/20/2018 No growth  Final   10/17/2018 No growth  Final   10/17/2018 No growth  Final   10/15/2018 (A)  Final    Moderate growth  Finegoldia magna (Peptostreptococcus parish)  Susceptibility testing not routinely done     10/15/2018 Moderate growth  Staphylococcus aureus   (A)  Final   10/14/2018 No growth  Final   10/14/2018 No growth  Final   06/19/2018 No growth  Final   06/19/2018 No growth  Final   05/01/2018 No growth  Final   05/01/2018 No growth  Final   05/01/2018 (A)  Final    Light growth  Beta hemolytic Streptococcus group B     05/01/2018 No growth  Final   05/25/2016 No Beta Streptococcus isolated  Final       Urine Studies    Recent Labs   Lab Test 06/17/24  1612 05/16/24  1823 04/28/24  0938 12/03/21  1415 01/24/20  1359   LEUKEST Negative 250 Su[place]apa/uL* Negative Negative Negative   WBCU 0-5 31* 2 5 0 - 5       Vancomycin Levels    Recent Labs   Lab Test 07/18/24  0935 07/11/24  0702 07/03/24  0840   VANCOMYCIN <4.0 <4.0 28.0*       MICROBIOLOGY DATA:    All cultures:  7-Day Micro  Results       Collected Updated Procedure Result Status      08/04/2024 2124 08/05/2024 2146 Blood Culture Peripheral Blood [[id number]]   Peripheral Blood    Preliminary result Component Value   Culture No growth after 1 day  [P]                         Pertinent Radiology  personally reviewed.       Chest XR,  PA & LAT    Result Date: 8/4/2024  EXAM: XR CHEST 2 VIEWS LOCATION: [facility] DATE: 8/4/2024 INDICATION: intermittent hypoxia COMPARISON: 5/18/2024. FINDINGS: Right PICC with tip at the junction of subclavian vein and SVC. The heart size is normal. The thoracic aorta is calcified. Mild right basilar infiltrate. Atelectasis and scar at the left lung base. There is no pneumothorax or pleural effusion. Degenerative disease in the spine. Spinal stimulator.     IMPRESSION: Mild right basilar atelectasis or pneumonia.    US Upper Extremity Venous Duplex Right    Result Date: 8/4/2024  EXAM: US UPPER EXTREMITY VENOUS DUPLEX RIGHT LOCATION: [facility] DATE: 8/4/2024 INDICATION: RUE pain, PICC line in place COMPARISON: None. TECHNIQUE: Venous Duplex ultrasound of the right upper extremity with (when possible) and without compression, augmentation, and duplex. Color flow and spectral Doppler with waveform analysis performed. FINDINGS: Ultrasound includes evaluation of the internal jugular vein, innominate vein, subclavian vein, and brachial vein. The superficial cephalic and basilic veins were also evaluated where seen. Please note that the axillary vein was not visualized due to difficulty with patient positioning. RIGHT: No deep venous thrombosis. No superficial thrombophlebitis.     IMPRESSION: 1.  No deep venous thrombosis in the right upper extremity where seen.

## 2024-08-06 NOTE — ED NOTES
Notified provider pt temperature was 100.9 temporally. Unable to get oral temp as pt was just drinking ice water. Pt also complaining of chills and is more tachycardic. Will administer Tylenol.

## 2024-08-07 ENCOUNTER — ANESTHESIA (OUTPATIENT)
Dept: SURGERY | Facility: CLINIC | Age: 79
DRG: 464 | End: 2024-08-07
Payer: COMMERCIAL

## 2024-08-07 ENCOUNTER — ANESTHESIA EVENT (OUTPATIENT)
Dept: SURGERY | Facility: CLINIC | Age: 79
DRG: 464 | End: 2024-08-07
Payer: COMMERCIAL

## 2024-08-07 LAB
ANION GAP SERPL CALCULATED.3IONS-SCNC: 9 MMOL/L (ref 7–15)
APPEARANCE FLD: ABNORMAL
APPEARANCE FLD: ABNORMAL
BACTERIA SPEC CULT: NORMAL
BASOPHILS # BLD AUTO: 0 10E3/UL (ref 0–0.2)
BASOPHILS NFR BLD AUTO: 0 %
BUN SERPL-MCNC: 16.2 MG/DL (ref 8–23)
CALCIUM SERPL-MCNC: 8.7 MG/DL (ref 8.8–10.4)
CELL COUNT BODY FLUID SOURCE: ABNORMAL
CELL COUNT BODY FLUID SOURCE: ABNORMAL
CHLORIDE SERPL-SCNC: 102 MMOL/L (ref 98–107)
COLOR FLD: ABNORMAL
COLOR FLD: ABNORMAL
CREAT SERPL-MCNC: 1.11 MG/DL (ref 0.67–1.17)
CRP SERPL-MCNC: 161 MG/L
CRYSTALS SNV MICRO: ABNORMAL
CRYSTALS SNV MICRO: ABNORMAL
EGFRCR SERPLBLD CKD-EPI 2021: 67 ML/MIN/1.73M2
EOSINOPHIL # BLD AUTO: 0.1 10E3/UL (ref 0–0.7)
EOSINOPHIL NFR BLD AUTO: 1 %
ERYTHROCYTE [DISTWIDTH] IN BLOOD BY AUTOMATED COUNT: 15.7 % (ref 10–15)
GLUCOSE BLDC GLUCOMTR-MCNC: 102 MG/DL (ref 70–99)
GLUCOSE BLDC GLUCOMTR-MCNC: 103 MG/DL (ref 70–99)
GLUCOSE BLDC GLUCOMTR-MCNC: 109 MG/DL (ref 70–99)
GLUCOSE BLDC GLUCOMTR-MCNC: 194 MG/DL (ref 70–99)
GLUCOSE BLDC GLUCOMTR-MCNC: 81 MG/DL (ref 70–99)
GLUCOSE BLDC GLUCOMTR-MCNC: 88 MG/DL (ref 70–99)
GLUCOSE SERPL-MCNC: 111 MG/DL (ref 70–99)
GRAM STAIN RESULT: NORMAL
HCO3 SERPL-SCNC: 25 MMOL/L (ref 22–29)
HCT VFR BLD AUTO: 29.2 % (ref 40–53)
HGB BLD-MCNC: 9.2 G/DL (ref 13.3–17.7)
HOLD SPECIMEN: NORMAL
IMM GRANULOCYTES # BLD: 0.1 10E3/UL
IMM GRANULOCYTES NFR BLD: 1 %
LYMPHOCYTES # BLD AUTO: 1.2 10E3/UL (ref 0.8–5.3)
LYMPHOCYTES NFR BLD AUTO: 13 %
LYMPHOCYTES NFR FLD MANUAL: NORMAL %
MCH RBC QN AUTO: 27 PG (ref 26.5–33)
MCHC RBC AUTO-ENTMCNC: 31.5 G/DL (ref 31.5–36.5)
MCV RBC AUTO: 86 FL (ref 78–100)
MONOCYTES # BLD AUTO: 1.1 10E3/UL (ref 0–1.3)
MONOCYTES NFR BLD AUTO: 13 %
MONOS+MACROS NFR FLD MANUAL: NORMAL %
NEUTROPHILS # BLD AUTO: 6.5 10E3/UL (ref 1.6–8.3)
NEUTROPHILS NFR BLD AUTO: 73 %
NEUTS BAND NFR FLD MANUAL: NORMAL %
NRBC # BLD AUTO: 0 10E3/UL
NRBC BLD AUTO-RTO: 0 /100
PLATELET # BLD AUTO: 359 10E3/UL (ref 150–450)
POTASSIUM SERPL-SCNC: 3.7 MMOL/L (ref 3.4–5.3)
RBC # BLD AUTO: 3.41 10E6/UL (ref 4.4–5.9)
SODIUM SERPL-SCNC: 136 MMOL/L (ref 135–145)
WBC # BLD AUTO: 9 10E3/UL (ref 4–11)
WBC # FLD AUTO: ABNORMAL 10*3/UL
WBC # FLD AUTO: ABNORMAL 10*3/UL

## 2024-08-07 PROCEDURE — 0JBG0ZZ EXCISION OF RIGHT LOWER ARM SUBCUTANEOUS TISSUE AND FASCIA, OPEN APPROACH: ICD-10-PCS | Performed by: STUDENT IN AN ORGANIZED HEALTH CARE EDUCATION/TRAINING PROGRAM

## 2024-08-07 PROCEDURE — 0RBL0ZZ EXCISION OF RIGHT ELBOW JOINT, OPEN APPROACH: ICD-10-PCS | Performed by: STUDENT IN AN ORGANIZED HEALTH CARE EDUCATION/TRAINING PROGRAM

## 2024-08-07 PROCEDURE — 370N000017 HC ANESTHESIA TECHNICAL FEE, PER MIN: Performed by: STUDENT IN AN ORGANIZED HEALTH CARE EDUCATION/TRAINING PROGRAM

## 2024-08-07 PROCEDURE — 89060 EXAM SYNOVIAL FLUID CRYSTALS: CPT | Performed by: STUDENT IN AN ORGANIZED HEALTH CARE EDUCATION/TRAINING PROGRAM

## 2024-08-07 PROCEDURE — 250N000025 HC SEVOFLURANE, PER MIN: Performed by: STUDENT IN AN ORGANIZED HEALTH CARE EDUCATION/TRAINING PROGRAM

## 2024-08-07 PROCEDURE — 258N000003 HC RX IP 258 OP 636: Performed by: NURSE ANESTHETIST, CERTIFIED REGISTERED

## 2024-08-07 PROCEDURE — 36415 COLL VENOUS BLD VENIPUNCTURE: CPT | Performed by: STUDENT IN AN ORGANIZED HEALTH CARE EDUCATION/TRAINING PROGRAM

## 2024-08-07 PROCEDURE — 87205 SMEAR GRAM STAIN: CPT | Performed by: STUDENT IN AN ORGANIZED HEALTH CARE EDUCATION/TRAINING PROGRAM

## 2024-08-07 PROCEDURE — 89050 BODY FLUID CELL COUNT: CPT | Performed by: STUDENT IN AN ORGANIZED HEALTH CARE EDUCATION/TRAINING PROGRAM

## 2024-08-07 PROCEDURE — 99233 SBSQ HOSP IP/OBS HIGH 50: CPT | Performed by: STUDENT IN AN ORGANIZED HEALTH CARE EDUCATION/TRAINING PROGRAM

## 2024-08-07 PROCEDURE — 250N000013 HC RX MED GY IP 250 OP 250 PS 637: Performed by: STUDENT IN AN ORGANIZED HEALTH CARE EDUCATION/TRAINING PROGRAM

## 2024-08-07 PROCEDURE — 80048 BASIC METABOLIC PNL TOTAL CA: CPT | Performed by: STUDENT IN AN ORGANIZED HEALTH CARE EDUCATION/TRAINING PROGRAM

## 2024-08-07 PROCEDURE — 250N000009 HC RX 250: Performed by: NURSE ANESTHETIST, CERTIFIED REGISTERED

## 2024-08-07 PROCEDURE — 250N000013 HC RX MED GY IP 250 OP 250 PS 637: Performed by: EMERGENCY MEDICINE

## 2024-08-07 PROCEDURE — 82962 GLUCOSE BLOOD TEST: CPT

## 2024-08-07 PROCEDURE — 999N000141 HC STATISTIC PRE-PROCEDURE NURSING ASSESSMENT: Performed by: STUDENT IN AN ORGANIZED HEALTH CARE EDUCATION/TRAINING PROGRAM

## 2024-08-07 PROCEDURE — 86140 C-REACTIVE PROTEIN: CPT | Performed by: STUDENT IN AN ORGANIZED HEALTH CARE EDUCATION/TRAINING PROGRAM

## 2024-08-07 PROCEDURE — 258N000003 HC RX IP 258 OP 636: Performed by: ANESTHESIOLOGY

## 2024-08-07 PROCEDURE — 87075 CULTR BACTERIA EXCEPT BLOOD: CPT | Performed by: STUDENT IN AN ORGANIZED HEALTH CARE EDUCATION/TRAINING PROGRAM

## 2024-08-07 PROCEDURE — 85041 AUTOMATED RBC COUNT: CPT | Performed by: STUDENT IN AN ORGANIZED HEALTH CARE EDUCATION/TRAINING PROGRAM

## 2024-08-07 PROCEDURE — 87070 CULTURE OTHR SPECIMN AEROBIC: CPT | Performed by: STUDENT IN AN ORGANIZED HEALTH CARE EDUCATION/TRAINING PROGRAM

## 2024-08-07 PROCEDURE — 710N000010 HC RECOVERY PHASE 1, LEVEL 2, PER MIN: Performed by: STUDENT IN AN ORGANIZED HEALTH CARE EDUCATION/TRAINING PROGRAM

## 2024-08-07 PROCEDURE — 0RBU0ZZ EXCISION OF RIGHT METACARPOPHALANGEAL JOINT, OPEN APPROACH: ICD-10-PCS | Performed by: STUDENT IN AN ORGANIZED HEALTH CARE EDUCATION/TRAINING PROGRAM

## 2024-08-07 PROCEDURE — 250N000011 HC RX IP 250 OP 636: Performed by: NURSE ANESTHETIST, CERTIFIED REGISTERED

## 2024-08-07 PROCEDURE — 258N000003 HC RX IP 258 OP 636: Performed by: STUDENT IN AN ORGANIZED HEALTH CARE EDUCATION/TRAINING PROGRAM

## 2024-08-07 PROCEDURE — 360N000077 HC SURGERY LEVEL 4, PER MIN: Performed by: STUDENT IN AN ORGANIZED HEALTH CARE EDUCATION/TRAINING PROGRAM

## 2024-08-07 PROCEDURE — 99232 SBSQ HOSP IP/OBS MODERATE 35: CPT | Performed by: INTERNAL MEDICINE

## 2024-08-07 PROCEDURE — 272N000001 HC OR GENERAL SUPPLY STERILE: Performed by: STUDENT IN AN ORGANIZED HEALTH CARE EDUCATION/TRAINING PROGRAM

## 2024-08-07 PROCEDURE — 0PB90ZZ EXCISION OF RIGHT CLAVICLE, OPEN APPROACH: ICD-10-PCS | Performed by: STUDENT IN AN ORGANIZED HEALTH CARE EDUCATION/TRAINING PROGRAM

## 2024-08-07 PROCEDURE — 250N000011 HC RX IP 250 OP 636: Performed by: STUDENT IN AN ORGANIZED HEALTH CARE EDUCATION/TRAINING PROGRAM

## 2024-08-07 PROCEDURE — 120N000001 HC R&B MED SURG/OB

## 2024-08-07 RX ORDER — LIDOCAINE 40 MG/G
CREAM TOPICAL
Status: DISCONTINUED | OUTPATIENT
Start: 2024-08-07 | End: 2024-08-07 | Stop reason: HOSPADM

## 2024-08-07 RX ORDER — ONDANSETRON 2 MG/ML
4 INJECTION INTRAMUSCULAR; INTRAVENOUS EVERY 30 MIN PRN
Status: DISCONTINUED | OUTPATIENT
Start: 2024-08-07 | End: 2024-08-07 | Stop reason: HOSPADM

## 2024-08-07 RX ORDER — KETAMINE HYDROCHLORIDE 10 MG/ML
INJECTION INTRAMUSCULAR; INTRAVENOUS PRN
Status: DISCONTINUED | OUTPATIENT
Start: 2024-08-07 | End: 2024-08-07

## 2024-08-07 RX ORDER — SODIUM CHLORIDE, SODIUM LACTATE, POTASSIUM CHLORIDE, CALCIUM CHLORIDE 600; 310; 30; 20 MG/100ML; MG/100ML; MG/100ML; MG/100ML
INJECTION, SOLUTION INTRAVENOUS CONTINUOUS
Status: DISCONTINUED | OUTPATIENT
Start: 2024-08-07 | End: 2024-08-07 | Stop reason: HOSPADM

## 2024-08-07 RX ORDER — NALOXONE HYDROCHLORIDE 0.4 MG/ML
0.1 INJECTION, SOLUTION INTRAMUSCULAR; INTRAVENOUS; SUBCUTANEOUS
Status: DISCONTINUED | OUTPATIENT
Start: 2024-08-07 | End: 2024-08-07 | Stop reason: HOSPADM

## 2024-08-07 RX ORDER — FENTANYL CITRATE 50 UG/ML
INJECTION, SOLUTION INTRAMUSCULAR; INTRAVENOUS PRN
Status: DISCONTINUED | OUTPATIENT
Start: 2024-08-07 | End: 2024-08-07

## 2024-08-07 RX ORDER — DEXAMETHASONE SODIUM PHOSPHATE 4 MG/ML
4 INJECTION, SOLUTION INTRA-ARTICULAR; INTRALESIONAL; INTRAMUSCULAR; INTRAVENOUS; SOFT TISSUE
Status: DISCONTINUED | OUTPATIENT
Start: 2024-08-07 | End: 2024-08-07 | Stop reason: HOSPADM

## 2024-08-07 RX ORDER — FENTANYL CITRATE 50 UG/ML
25 INJECTION, SOLUTION INTRAMUSCULAR; INTRAVENOUS EVERY 5 MIN PRN
Status: DISCONTINUED | OUTPATIENT
Start: 2024-08-07 | End: 2024-08-07 | Stop reason: HOSPADM

## 2024-08-07 RX ORDER — ONDANSETRON 2 MG/ML
INJECTION INTRAMUSCULAR; INTRAVENOUS PRN
Status: DISCONTINUED | OUTPATIENT
Start: 2024-08-07 | End: 2024-08-07

## 2024-08-07 RX ORDER — LIDOCAINE HYDROCHLORIDE 10 MG/ML
INJECTION, SOLUTION INFILTRATION; PERINEURAL PRN
Status: DISCONTINUED | OUTPATIENT
Start: 2024-08-07 | End: 2024-08-07

## 2024-08-07 RX ORDER — PROPOFOL 10 MG/ML
INJECTION, EMULSION INTRAVENOUS PRN
Status: DISCONTINUED | OUTPATIENT
Start: 2024-08-07 | End: 2024-08-07

## 2024-08-07 RX ORDER — HYDROMORPHONE HCL IN WATER/PF 6 MG/30 ML
0.2 PATIENT CONTROLLED ANALGESIA SYRINGE INTRAVENOUS EVERY 5 MIN PRN
Status: DISCONTINUED | OUTPATIENT
Start: 2024-08-07 | End: 2024-08-07 | Stop reason: HOSPADM

## 2024-08-07 RX ORDER — HYDROMORPHONE HCL IN WATER/PF 6 MG/30 ML
0.4 PATIENT CONTROLLED ANALGESIA SYRINGE INTRAVENOUS EVERY 5 MIN PRN
Status: DISCONTINUED | OUTPATIENT
Start: 2024-08-07 | End: 2024-08-07 | Stop reason: HOSPADM

## 2024-08-07 RX ORDER — FENTANYL CITRATE 50 UG/ML
50 INJECTION, SOLUTION INTRAMUSCULAR; INTRAVENOUS EVERY 5 MIN PRN
Status: DISCONTINUED | OUTPATIENT
Start: 2024-08-07 | End: 2024-08-07 | Stop reason: HOSPADM

## 2024-08-07 RX ORDER — HYDRALAZINE HYDROCHLORIDE 20 MG/ML
10 INJECTION INTRAMUSCULAR; INTRAVENOUS EVERY 4 HOURS PRN
Status: DISCONTINUED | OUTPATIENT
Start: 2024-08-07 | End: 2024-08-16 | Stop reason: HOSPADM

## 2024-08-07 RX ORDER — ONDANSETRON 4 MG/1
4 TABLET, ORALLY DISINTEGRATING ORAL EVERY 30 MIN PRN
Status: DISCONTINUED | OUTPATIENT
Start: 2024-08-07 | End: 2024-08-07 | Stop reason: HOSPADM

## 2024-08-07 RX ADMIN — VANCOMYCIN HYDROCHLORIDE 1500 MG: 5 INJECTION, POWDER, LYOPHILIZED, FOR SOLUTION INTRAVENOUS at 22:00

## 2024-08-07 RX ADMIN — ENOXAPARIN SODIUM 40 MG: 100 INJECTION SUBCUTANEOUS at 12:20

## 2024-08-07 RX ADMIN — CARBOXYMETHYLCELLULOSE SODIUM 1 DROP: 10 GEL OPHTHALMIC at 09:06

## 2024-08-07 RX ADMIN — PROPOFOL 150 MG: 10 INJECTION, EMULSION INTRAVENOUS at 17:46

## 2024-08-07 RX ADMIN — FENTANYL CITRATE 100 MCG: 50 INJECTION INTRAMUSCULAR; INTRAVENOUS at 17:46

## 2024-08-07 RX ADMIN — HYDROMORPHONE HYDROCHLORIDE 0.5 MG: 1 INJECTION, SOLUTION INTRAMUSCULAR; INTRAVENOUS; SUBCUTANEOUS at 18:39

## 2024-08-07 RX ADMIN — LIDOCAINE 2 PATCH: 4 PATCH TOPICAL at 12:21

## 2024-08-07 RX ADMIN — SODIUM BICARBONATE 1950 MG: 650 TABLET ORAL at 12:21

## 2024-08-07 RX ADMIN — COLCHICINE 0.6 MG: 0.6 TABLET ORAL at 09:06

## 2024-08-07 RX ADMIN — ONDANSETRON 4 MG: 2 INJECTION INTRAMUSCULAR; INTRAVENOUS at 18:50

## 2024-08-07 RX ADMIN — PHENYLEPHRINE HYDROCHLORIDE 0.2 MCG/KG/MIN: 10 INJECTION INTRAVENOUS at 18:21

## 2024-08-07 RX ADMIN — PHENYLEPHRINE HYDROCHLORIDE 150 MCG: 10 INJECTION INTRAVENOUS at 17:57

## 2024-08-07 RX ADMIN — LIDOCAINE HYDROCHLORIDE 5 ML: 10 INJECTION, SOLUTION INFILTRATION; PERINEURAL at 17:46

## 2024-08-07 RX ADMIN — SODIUM CHLORIDE, POTASSIUM CHLORIDE, SODIUM LACTATE AND CALCIUM CHLORIDE: 600; 310; 30; 20 INJECTION, SOLUTION INTRAVENOUS at 19:03

## 2024-08-07 RX ADMIN — GABAPENTIN 300 MG: 300 CAPSULE ORAL at 12:21

## 2024-08-07 RX ADMIN — SODIUM CHLORIDE, POTASSIUM CHLORIDE, SODIUM LACTATE AND CALCIUM CHLORIDE: 600; 310; 30; 20 INJECTION, SOLUTION INTRAVENOUS at 17:40

## 2024-08-07 RX ADMIN — ASPIRIN 81 MG: 81 TABLET, COATED ORAL at 09:05

## 2024-08-07 RX ADMIN — METHOCARBAMOL 500 MG: 500 TABLET ORAL at 09:10

## 2024-08-07 RX ADMIN — FINASTERIDE 5 MG: 5 TABLET, FILM COATED ORAL at 09:06

## 2024-08-07 RX ADMIN — ROCURONIUM BROMIDE 50 MG: 10 INJECTION, SOLUTION INTRAVENOUS at 17:46

## 2024-08-07 RX ADMIN — SODIUM BICARBONATE 1950 MG: 650 TABLET ORAL at 09:10

## 2024-08-07 RX ADMIN — GABAPENTIN 300 MG: 300 CAPSULE ORAL at 09:05

## 2024-08-07 RX ADMIN — PHENYLEPHRINE HYDROCHLORIDE 50 MCG: 10 INJECTION INTRAVENOUS at 18:18

## 2024-08-07 RX ADMIN — HYDROMORPHONE HYDROCHLORIDE 0.5 MG: 1 INJECTION, SOLUTION INTRAMUSCULAR; INTRAVENOUS; SUBCUTANEOUS at 18:35

## 2024-08-07 RX ADMIN — Medication 2 CAPSULE: at 09:07

## 2024-08-07 RX ADMIN — OXYCODONE HYDROCHLORIDE 2.5 MG: 5 TABLET ORAL at 04:03

## 2024-08-07 RX ADMIN — SUGAMMADEX 200 MG: 100 INJECTION, SOLUTION INTRAVENOUS at 18:54

## 2024-08-07 RX ADMIN — PHENYLEPHRINE HYDROCHLORIDE 50 MCG: 10 INJECTION INTRAVENOUS at 18:21

## 2024-08-07 RX ADMIN — KETAMINE HYDROCHLORIDE 20 MG: 10 INJECTION INTRAMUSCULAR; INTRAVENOUS at 18:07

## 2024-08-07 ASSESSMENT — ACTIVITIES OF DAILY LIVING (ADL)
ADLS_ACUITY_SCORE: 49
ADLS_ACUITY_SCORE: 46
ADLS_ACUITY_SCORE: 49
ADLS_ACUITY_SCORE: 50
ADLS_ACUITY_SCORE: 49
ADLS_ACUITY_SCORE: 46
ADLS_ACUITY_SCORE: 50
ADLS_ACUITY_SCORE: 49
ADLS_ACUITY_SCORE: 46
ADLS_ACUITY_SCORE: 49
ADLS_ACUITY_SCORE: 50
ADLS_ACUITY_SCORE: 46
ADLS_ACUITY_SCORE: 50
ADLS_ACUITY_SCORE: 50

## 2024-08-07 NOTE — INTERVAL H&P NOTE
I have reviewed the surgical (or preoperative) H&P that is linked to this encounter, and examined the patient. There are no significant changes    Terence Hanson MD

## 2024-08-07 NOTE — ANESTHESIA PROCEDURE NOTES
Airway       Patient location during procedure: OR       Procedure Start/Stop Times: 8/7/2024 5:48 PM  Staff -        CRNA: Jayne Patiño APRN CRNA       Performed By: CRNAIndications and Patient Condition       Induction type:intravenous       Mask difficulty assessment: 1 - vent by mask    Final Airway Details       Final airway type: endotracheal airway       Successful airway: ETT - single  Endotracheal Airway Details        ETT size (mm): 8.0       Cuffed: yes       Cuff volume (mL): 7       Successful intubation technique: direct laryngoscopy       DL Blade Type: Mares 2       Grade View of Cords: 1       Adjucts: stylet       Position: Left       Measured from: gums/teeth       Secured at (cm): 23       Bite block used: Soft    Post intubation assessment        Placement verified by: capnometry, equal breath sounds and chest rise        Number of attempts at approach: 1       Number of other approaches attempted: 0       Secured with: tape       Ease of procedure: easy       Dentition: Intact and Unchanged       Dental guard used and removed. Dental Guard Type: Standard White.    Medication(s) Administered   Medication Administration Time: 8/7/2024 5:48 PM    Additional Comments       Head maintained in neutral position at all times

## 2024-08-07 NOTE — PROGRESS NOTES
Infectious Diseases Progress Note  [facility]    Date of visit: 08/07/2024     Assessment:    Comorbid conditions affecting immune system: Type II Diabetes mellitus   Active Problems:    Acute pain of right shoulder    Fever, unspecified fever cause    Right Shoulder Pain  Concern for right shoulder septic Arthritis  -acute onset  -CRP elevated on admission 142.WBC 11.8 on admission, improved  -possible gout flare; now with right thumb pain, right elbow pain/bursitis  -blood culture negative to date  -mri ordered of shoulder showing AC joint buritis/inflammation.   -seen by ortho, plans for shoulder debridement today.    Recent L4-L5 discitis/osteomyelitis  -s/p aspiration on 6/19 - culture negative  -treated with 6 weeks IV vancomycin and Ertapenem - completed  -Repeat lumbar CT on 7/26/2024 showed resolution of epidural abscess      Recommendations:   -Continue vancomycin  -please collect intra-operative cultures. If possible send synovial fluid for cell counts and crystal evaluation    Koko Wolfe MD  [facility]  Direct messaging: [employer]   On-Call ID provider: [phone], option: 9   =====================================    SUBJECTIVE / INTERVAL HISTORY:     Ongoing right neck, shoulder, elbow, and hand pain. No significant changes. NPO for surgery today. No fevers         Physical Exam:  Temp:  [97.8  F (36.6  C)-100  F (37.8  C)] 97.8  F (36.6  C)  Pulse:  [] 99  Resp:  [18-25] 18  BP: (133-161)/(63-77) 133/63  SpO2:  [89 %-93 %] 93 %     GEN: alert and oriented x3, NAD  HEAD: atraumatic  NECK: pain with movement  PULMONARY: normal resp pattern  SKIN: no rashes or lesions.   MUSCULOSKELETAL: right shoulder tenderness. Right 1st MCP redness and tenderness. Limited range of motion in elbow, wrist and thumb    MICROBIOLOGY DATA:    All cultures:  7-Day Micro Results       Collected Updated Procedure Result Status      08/04/2024 2124 08/06/2024 2146 Blood  Culture Peripheral Blood [[id number]]   Peripheral Blood    Preliminary result Component Value   Culture No growth after 2 days  [P]                         Pertinent Labs:     Recent Labs   Lab 08/07/24 0645 08/04/24 2124 08/01/24  0835   WBC 9.0 11.8* 6.4   HGB 9.2* 11.1* 11.7*    360 325       Recent Labs   Lab 08/07/24 0645 08/05/24  0640 08/04/24 2124 08/01/24  0835    139 139  --    CO2 25 27 28  --    BUN 16.2 16.8 16.5  --    ALBUMIN  --   --  3.0*  --    ALKPHOS  --   --  155*  --    ALT  --   --  19  --    AST  --   --  25 39       Recent Labs   Lab 08/07/24 0645 08/04/24 2124 08/01/24  0835   CRPI 161.00* 142.00* 19.20*           Imaging:     MR Shoulder Right w/o & w Contrast    Result Date: 8/6/2024  EXAM: MR SHOULDER RIGHT W/O and W CONTRAST LOCATION: [facility] DATE: 8/6/2024 INDICATION: severe shoulder pain, fever; history of bacteremia; COMPARISON: None. TECHNIQUE: Routine. Additional postgadolinium T1 sequences were obtained. IV CONTRAST: 8.5 mL Gadavist FINDINGS: ROTATOR CUFF: -Supraspinatus: No tendon tear, tendinopathy or fatty atrophy. -Infraspinatus: No tendon tear, tendinopathy or fatty atrophy. -Subscapularis: No tendon tear, tendinopathy or fatty atrophy. -Teres minor: No tendon tear, tendinopathy or fatty atrophy. ACROMIOCLAVICULAR JOINT: -Degenerative change at the acromioclavicular joint with undersurface osteophytes. There is a joint effusion with abnormal synovial enhancement at the acromioclavicular joint. This is contiguous with a subacromial bursal effusion where there is abnormal synovial enhancement. Abnormal soft tissue edema and enhancement are observed around the acromioclavicular joint and the distal clavicle, involving the distal trapezius muscle and the adjacent anterior deltoid. LONG HEAD OF BICEPS TENDON: -No tendinopathy or tear. No tenosynovitis or subluxation. GLENOHUMERAL JOINT: Final anatomic detail of the articular  cartilage is compromised by motion. There appears to be preservation of the cartilage at this articulation. A small volume of joint fluid is present and there is no abnormal synovial thickening or enhancement. The glenoid labrum is not well demonstrated. Visualized parts appear grossly normal. BONES: -Marrow signal throughout the shoulder is normal except for some subtle abnormal marrow edema and enhancement at the distal clavicle adjacent to arthropathic change and a subcortical cyst. SOFT TISSUES: -Soft tissue edema and enhancement in the anterior deltoid and the distal trapezius muscle as discussed above. There is no evidence of abscess or other fluid collection, other than that already described in the subacromial space.     IMPRESSION: 1.  Abnormal infectious or inflammatory arthritis at the right acromioclavicular joint with subacromial subdeltoid bursitis and bursal effusion. Subtle marrow signal abnormality is observed at the distal clavicle where there is degenerative change. Whether this is reactive related to the chronic arthropathy or represents early osteomyelitis is not determined. The rotator cuff structures appear intact and there is no evidence of inflammation within the glenohumeral joint space at this time.    Chest XR,  PA & LAT    Result Date: 8/4/2024  EXAM: XR CHEST 2 VIEWS LOCATION: [facility] DATE: 8/4/2024 INDICATION: intermittent hypoxia COMPARISON: 5/18/2024. FINDINGS: Right PICC with tip at the junction of subclavian vein and SVC. The heart size is normal. The thoracic aorta is calcified. Mild right basilar infiltrate. Atelectasis and scar at the left lung base. There is no pneumothorax or pleural effusion. Degenerative disease in the spine. Spinal stimulator.     IMPRESSION: Mild right basilar atelectasis or pneumonia.    US Upper Extremity Venous Duplex Right    Result Date: 8/4/2024  EXAM: US UPPER EXTREMITY VENOUS DUPLEX RIGHT LOCATION: [facility] DATE: 8/4/2024 INDICATION: RUE pain, PICC line in place COMPARISON: None. TECHNIQUE: Venous Duplex ultrasound of the right upper extremity with (when possible) and without compression, augmentation, and duplex. Color flow and spectral Doppler with waveform analysis performed. FINDINGS: Ultrasound includes evaluation of the internal jugular vein, innominate vein, subclavian vein, and brachial vein. The superficial cephalic and basilic veins were also evaluated where seen. Please note that the axillary vein was not visualized due to difficulty with patient positioning. RIGHT: No deep venous thrombosis. No superficial thrombophlebitis.     IMPRESSION: 1.  No deep venous thrombosis in the right upper extremity where seen.    MR Lumbar Spine w/o & w Contrast    Result Date: 7/26/2024  EXAM: MR LUMBAR SPINE W/O and W CONTRAST LOCATION: [facility] DATE: 7/26/2024 INDICATION: L4 5 discitis osteomyelitis   has spinal cord stimulator COMPARISON: None. CONTRAST: 8.5mL Gadavist TECHNIQUE: Routine Lumbar Spine MRI without and with IV contrast. FINDINGS: Nomenclature is based on 5 lumbar type vertebral bodies. Slight reversal of normal lumbar lordosis. At L4-L5, to millimeter retrolisthesis. Vertebral body heights are maintained. Interval loss of disc space height at L4-L5. No definite residual intradiscal T2 STIR hyperintensity or enhancement. Extensive marrow T2 STIR hyperintensity and enhancement involving the L4 and L5 vertebral bodies is slightly increased compared to the prior MRI. Resolution of the previously seen ventral epidural abscess disease. Small amount of residual ventral epidural phlegmon. Enhancing fat stranding in the bilateral L4-L5 neural foramina. Resolution of the previously seen rim-enhancing cystic lesion in the dorsal left lumbosacral paraspinal musculature. Normal distal spinal cord. Conus medullaris remains at the level L1. Unchanged ventral clumping of the  cauda equina nerve roots in the upper lumbar thecal sac and peripheral displacement of the cauda equina nerve roots in the lower lumbar and sacral thecal sac No extraspinal abnormality. Unremarkable visualized bony pelvis. T12-L1: Normal disc height and signal. No herniation. Normal facets. No spinal canal or neural foraminal stenosis. L1-L2: Normal disc height and signal. No herniation. Normal facets. No spinal canal or neural foraminal stenosis. L2-L3: Normal disc height and signal. No herniation. Normal facets. No spinal canal or neural foraminal stenosis. L3-L4: Mild disc height loss. Disc desiccation. Shallow disc bulge. Mild facet arthropathy. Mild spinal stenosis. Mild bilateral neural foraminal stenosis L4-L5: Advanced disc height loss. Circumferential disc ossific complex. Mild separately. Mild central spinal canal stenosis. Moderate lateral recess narrowing. Moderate neural foraminal stenosis bilaterally with enhancement in the neural foraminal fat. L5-S1: Moderate disc height loss. Disc desiccation. Disc bulge. Mild facet arthropathy. Mild spinal canal stenosis. Moderate neural foraminal stenosis on the right. Mild to moderate neural foraminal stenosis on the left.     IMPRESSION: 1.  Resolution of previously seen intramural abscess with small amount of residual ventral epidural phlegmon. Resolution of previously seen abscesses in the left lumbosacral dorsal paraspinal musculature. 2.  Evolution of osseous changes of the L4 and L5 vertebral bodies with interval loss of intervertebral disc space height. 3.  No high-grade spinal canal stenosis. 4.  Moderate neural foraminal stenosis bilaterally at L4-L5 and on the right at L5-S1.    CT Lumbar Spine w/o Contrast    Result Date: 7/26/2024  EXAM: CT LUMBAR SPINE W/O CONTRAST LOCATION: [facility] DATE: 7/26/2024 INDICATION: Low back pain; signs and symptoms of Infection; None of the following: Fever chills, or low back pain with rest  or at night; None of the following: Immunocompromised, IV drug use, spinal procedure, or UTI COMPARISON: MRI lumbar spine 26 of July 2024. TECHNIQUE: Routine CT Lumbar Spine with intrathecal contrast administered in a separate procedure. Multiplanar reformats. Dose reduction techniques were used. FINDINGS: Nomenclature is based on 5 lumbar type vertebral bodies. Destructive process is present centered at the L5-S1 disc space. Adjacent endplate erosions compatible with osteomyelitis and correlating with the MRI findings. Loss of normal tissue planes in the medial left psoas concerning for myositis. No definite retroperitoneal fluid collection identified. Ventral canal epidural thickening compatible with phlegmon/abscess with mild to moderate central canal stenosis. Mild SI joint arthropathy. Spine stimulator leads enter the dorsal canal at the T12-L1 level T11-12: Patent canal and foramina. Degenerative disc changes. T12-L1: Normal disc height. No herniation. Normal facets. No spinal canal or neural foraminal stenosis. L1-L2: Mild degenerative disc changes with ventral osteophyte. Patent central canal and foramen. L2-L3: Degenerative disc changes. Ventral bridging osteophyte. Patent canal and foramina. L3-L4: Degenerative disc changes. Ventral osteophyte. Facet DJD. Patent central canal and foramina. L4-L5: Inflammatory process compatible with discitis and osteomyelitis. Retroperitoneal inflammatory process most pronounced involving the medial left psoas. Ventral canal epidural thickening. 3 mm degenerative retrolisthesis. Disc osteophyte. L5-S1: Disc osteophyte complex eccentric left. Facet DJD. Mild central canal stenosis. Moderate left and mild right lateral recess stenosis. Moderate right and mild left foraminal stenosis.     IMPRESSION: 1.  L4-5 inflammatory process with endplate erosions and retroperitoneal inflammatory change. Epidural thickening in the ventral canal. Compatible with discitis and osteomyelitis  and correlates with the MRI finding. 2.  No significant pathologic fracture or vertebral body height loss. 3.  Lumbar spondylosis.         Data reviewed today: I reviewed all medications, new labs and imaging results over the last 24 hours. I personally reviewed the shoulder MR image(s) showing AC joint infalmmation .  The patient's care was discussed with the Patient.

## 2024-08-07 NOTE — PROGRESS NOTES
[facility] Progress Note        Subjective:    Pain: no change, continued pain throughout the right upper extremity   MRI with inflammatory vs infectious changes at the AC joint   Afebrile on abx      Objective:  Blood pressure (!) 142/68, pulse 85, temperature 98.9  F (37.2  C), temperature source Oral, resp. rate 18, height 1.829 m (6'), weight 86.6 kg (191 lb), SpO2 93%.    Patient Vitals for the past 24 hrs:   BP Temp Temp src Pulse Resp SpO2   08/06/24 2359 (!) 142/68 98.9  F (37.2  C) Oral 85 18 93 %   08/06/24 1548 (!) 144/70 100  F (37.8  C) Oral 99 25 93 %   08/06/24 0700 (!) 149/76 98.6  F (37  C) Oral 83 13 93 %   08/06/24 0655 -- 98.6  F (37  C) Oral -- -- --       Wt Readings from Last 4 Encounters:   08/04/24 86.6 kg (191 lb)   07/29/24 86.2 kg (190 lb)   07/25/24 87.1 kg (192 lb)   07/19/24 86.6 kg (191 lb)       Exam unchanged, see consult   Tender at the AC joint and significant tenderness at the elbow. MCP thumb is tender     Pertinent Labs   Lab Results: personally reviewed.     Recent Labs   Lab Test 08/05/24  0640 08/04/24  2124 08/01/24  0835 07/26/24  0841 06/27/24  1106 06/24/24  0739 06/20/24  0829 06/19/24  1316 04/26/24  0852 04/26/24  0430 08/26/22  0942 08/25/22  1746 06/16/22  0934 12/25/21  2000 07/29/21  0657 07/28/21  0337 04/29/21  1631 07/28/20  0626   INR  --   --   --   --   --   --   --  1.04  --  1.11  --   --   --  1.17*  --   --   --   --    WBC  --  11.8* 6.4 4.4   < >  --   --   --    < > 11.4*   < > 12.2*   < > 18.6*   < > 8.9   < > 7.4   HGB  --  11.1* 11.7* 11.9*   < > 9.4*   < >  --    < > 11.9*   < > 10.9*   < > 12.7*   < > 12.1*   < > 10.7*   HCT  --  35.4* 39.9* 39.4*   < >  --   --   --    < > 35.5*   < > 34.0*   < > 40.0   < > 38.1*   < > 35.2*   MCV  --  87 94 92   < >  --   --   --    < > 87   < > 89   < > 91   < > 97   < > 92   PLT  --  360 325 290   < >  --   --   --    < > 253   < > 441   < > 379   < > 264   < > 219    139   --   --   --  138   < >  --    < > 138   < > 138   < > 136   < > 139   < > 137   CRP  --   --   --   --   --   --   --   --   --   --   --  4.7*  --   --   --  84.2*  --  48.3*    < > = values in this interval not displayed.         Plan: Patient made NPO. Will discuss with radiology regarding inflam vs infection on MRI; AC inflammation is extremely common in this age group. Tentative I&D right AC joint with distal clavicle excision, I&D elbow, possible thumb, late afternoon     Report completed by:  Terence Hanson MD  Date: 8/7/2024  Time: 6:23 AM

## 2024-08-07 NOTE — ANESTHESIA PREPROCEDURE EVALUATION
Anesthesia Pre-Procedure Evaluation    Patient: Lance Ibrahim   MRN: [id number] : 1945        Procedure : Procedure(s):  IRRIGATION AND DEBRIDEMENT, SHOULDER  EXCISION, CLAVICLE, DISTAL  IRRIGATION AND DEBRIDEMENT, ELBOW          Past Medical History:   Diagnosis Date    Anemia     Arthritis     osteoarthritis knees    BPH     CAD (coronary artery disease)     subtotal occlusion in the small distal LAD     Cardiomyopathy     Cerebral artery occlusion with cerebral infarction     TIA , no residual    Cervicalgia     CHF (congestive heart failure) (H)     Chronic kidney disease     Chronic low back pain     Chronic rhinitis     Gouty arthropathy     hands    Hyperlipidemia     Hypertension     Kidney stone     Nocturia     Obesity     Osteomyelitis (H) 2023    Foot    PVD (peripheral vascular disease)     Sleep apnea     doesn't use cpap    TIA (transient ischaemic attack)     Type 2 diabetes mellitus - 2018    Ureteral stone       Past Surgical History:   Procedure Laterality Date    AMPUTATE FOOT Right 2023    Procedure: AMPUTATION, right hallux;  Surgeon: Nakul Salazar DPM;  Location: [facility] OR    AMPUTATE TOE(S) Left 10/15/2018    Procedure: AMPUTATE TOE(S);  Left third toe amputation;  Surgeon: Ayaka Azevedo DPM, Podiatry/Foot and Ankle Surgery;  Location:  OR    ARTHROPLASTY KNEE Right 2018    Procedure: Right total knee arthroplasty;  Surgeon: Issa Cunha MD;  Location:  OR    COLONOSCOPY  2013    Procedure: COLONOSCOPY;  COLONOSCOPY;  Surgeon: Chau Hogan MD;  Location:  GI    CV HEART CATHETERIZATION WITH POSSIBLE INTERVENTION Left 2019    Procedure: Coronary Angiogram;  Surgeon: Nas Linda MD;  Location:  HEART CARDIAC CATH LAB    Diastasis recti repair      ENDOSCOPIC RETROGRADE CHOLANGIOPANCREATOGRAM N/A 2024    Procedure: ENDOSCOPIC RETROGRADE CHOLANGIOPANCREATOGRAPHY, BILIARY SPHINCTEROTOMY, DILATION,  Immunosuppresion Meds: Tacrolimus  Patient Notified: 04/22/21  Patient Notified Time: 1731  Tacrolimus Result Date: 04/22/21  Tacrolimus Test Result: 9.7  Current Dose: 4/3.5  Current Range Goal: 4-8  New Dose: 3.5/3.5  Comments: repeat labs in 1 month.       "BRUSHING, BIOPSIES with DISTAL EXTRA HEPATIC BILE DUCT STRICTURE;  Surgeon: Aldo Gongora MD;  Location: [facility] OR    ESOPHAGOSCOPY, GASTROSCOPY, DUODENOSCOPY (EGD), COMBINED N/A 2024    Procedure: ESOPHAGOGASTRODUODENOSCOPY, WITH ENDOSCOPIC ULTRASOUND GUIDANCE;  Surgeon: Aldo Gongora MD;  Location: [facility] OR    EXTRACAPSULAR CATARACT EXTRATION WITH INTRAOCULAR LENS IMPLANT Left 2017    EXTRACAPSULAR CATARACT EXTRATION WITH INTRAOCULAR LENS IMPLANT Right     FOOT SURGERY  2013    cyst removal     HERNIA REPAIR  2004    ventral     IR DISC ASPIRATION INJECTION  2024    ORIF Shoulder Left     PROSTATE SURGERY  2024    Urolift    SPINAL CORD STIMULATOR IMPLANT  2024    Ventral hernia repair NOS  1987      Allergies   Allergen Reactions    Ancef [Cefazolin] Rash    Cephalexin Itching and Rash     Allergic reaction required hospitalization 2024    Penicillins Anaphylaxis    Sulfa Antibiotics      \"itchy rash, swelling of face and hands\"      Social History     Tobacco Use    Smoking status: Former     Current packs/day: 0.00     Types: Cigarettes     Quit date: 3/17/1993     Years since quittin.4     Passive exposure: Never    Smokeless tobacco: Never   Substance Use Topics    Alcohol use: Not Currently      Wt Readings from Last 1 Encounters:   24 86.6 kg (191 lb)        Anesthesia Evaluation   Pt has had prior anesthetic.         ROS/MED HX  ENT/Pulmonary:     (+) sleep apnea, uses CPAP,                                      Neurologic:     (+)          CVA,    TIA,                  Cardiovascular: Comment: Interpretation Summary     1. The left ventricle is normal in size. Left ventricular function is  decreased. The ejection fraction is 50-55% (borderline). There is mild  concentric left ventricular hypertrophy. Left ventricular diastolic function  is abnormal. There is borderline global hypokinesia of the left ventricle.  Wall motion is " consistent with conduction abnormality.  2. Normal right ventricle size and systolic function.  3. Moderate aortic valve calcification is present. Mild valvular aortic  stenosis.  4. RA pressure of 8 mmHg.  Compared to study on 3/16/2020, mild aortic stenosis is present on current  study.      (+)  hypertension- Peripheral Vascular Disease-  CAD -  - -      CHF     ISBELL.                valvular problems/murmurs type: AS     Previous cardiac testing     METS/Exercise Tolerance:     Hematologic:       Musculoskeletal:   (+)  arthritis,             GI/Hepatic:       Renal/Genitourinary:     (+) renal disease, type: CRI,            Endo:     (+)  type II DM,             Obesity,       Psychiatric/Substance Use:  - neg psychiatric ROS     Infectious Disease:     (+) Recent Fever,           Malignancy:       Other:            Physical Exam    Airway        Mallampati: II   TM distance: > 3 FB   Neck ROM: limited   Mouth opening: > 3 cm    Respiratory Devices and Support         Dental       (+) Modest Abnormalities - crowns, retainers, 1 or 2 missing teeth      Cardiovascular   cardiovascular exam normal       Rhythm and rate: regular and normal     Pulmonary   pulmonary exam normal        breath sounds clear to auscultation           OUTSIDE LABS:  CBC:   Lab Results   Component Value Date    WBC 9.0 08/07/2024    WBC 11.8 (H) 08/04/2024    HGB 9.2 (L) 08/07/2024    HGB 11.1 (L) 08/04/2024    HCT 29.2 (L) 08/07/2024    HCT 35.4 (L) 08/04/2024     08/07/2024     08/04/2024     BMP:   Lab Results   Component Value Date     08/07/2024     08/05/2024    POTASSIUM 3.7 08/07/2024    POTASSIUM 3.6 08/05/2024    CHLORIDE 102 08/07/2024    CHLORIDE 103 08/05/2024    CO2 25 08/07/2024    CO2 27 08/05/2024    BUN 16.2 08/07/2024    BUN 16.8 08/05/2024    CR 1.11 08/07/2024    CR 1.14 08/05/2024     (H) 08/07/2024     (H) 08/07/2024     COAGS:   Lab Results   Component Value Date    PTT 35  03/04/2019    INR 1.04 06/19/2024     POC:   Lab Results   Component Value Date    BGM 93 07/28/2020     HEPATIC:   Lab Results   Component Value Date    ALBUMIN 3.0 (L) 08/04/2024    PROTTOTAL 7.4 08/04/2024    ALT 19 08/04/2024    AST 25 08/04/2024    ALKPHOS 155 (H) 08/04/2024    BILITOTAL 0.3 08/04/2024     OTHER:   Lab Results   Component Value Date    PH 7.48 (H) 10/17/2018    LACT 0.7 08/06/2024    A1C 8.0 (H) 06/18/2024    SHANNAN 8.7 (L) 08/07/2024    PHOS 3.5 05/18/2024    MAG 1.8 05/22/2024    LIPASE 100 (H) 04/30/2024    TSH 3.24 01/24/2020    CRP 4.7 (H) 08/25/2022    SED 69 (H) 06/18/2024       Anesthesia Plan    ASA Status:  3       Anesthesia Type: General.     - Airway: ETT   Induction: Intravenous, Propofol.   Maintenance: Balanced.   Techniques and Equipment:     - Airway: Video-Laryngoscope       - Drips/Meds: Dexmed. bolus, Ketamine     Consents    Anesthesia Plan(s) and associated risks, benefits, and realistic alternatives discussed. Questions answered and patient/representative(s) expressed understanding.     - Discussed:     - Discussed with:  Patient      - Extended Intubation/Ventilatory Support Discussed: No.           Postoperative Care    Pain management: IV analgesics.   PONV prophylaxis: Ondansetron (or other 5HT-3), Dexamethasone or Solumedrol, Background Propofol Infusion     Comments:               Carlos A Lloyd MD    I have reviewed the pertinent notes and labs in the chart from the past 30 days and (re)examined the patient.  Any updates or changes from those notes are reflected in this note.

## 2024-08-07 NOTE — CONSULTS
Care Management Initial Consult    General Information  Assessment completed with: Patient,    Type of CM/SW Visit: Initial Assessment    Primary Care Provider verified and updated as needed: Yes   Readmission within the last 30 days: no previous admission in last 30 days      Reason for Consult: discharge planning  Advance Care Planning: Advance Care Planning Reviewed: present on chart, other (see comments) (Has HCD in Epic)     General Information Comments: From  [facility] and is holding room    Communication Assessment  Patient's communication style: spoken language (English or Bilingual)    Hearing Difficulty or Deaf: no   Wear Glasses or Blind: yes    Cognitive  Cognitive/Neuro/Behavioral: level of consciousness  Level of Consciousness: intermittent confusion  Arousal Level: opens eyes spontaneously  Orientation: oriented x 4  Mood/Behavior: flat affect     Speech: clear, spontaneous    Living Environment:   People in home: alone     Current living Arrangements: assisted living  Name of Facility: [facility]   Able to return to prior arrangements: yes (Back to  [facility] [place] where he is holding room)  Living Arrangement Comments: Lives alone    Family/Social Support:  Care provided by: self, other (see comments) (Facility resident)  Provides care for: no one  Marital Status:   Other (specify), Sibling(s) (Step son)          Description of Support System: Supportive    Support Assessment: Lacks adequate physical care, Lacks necessary supervision and assistance    Current Resources:   Patient receiving home care services: No     Community Resources: Skilled Nursing Facility  Equipment currently used at home: glucometer  Supplies currently used at home: Diabetic Supplies    Employment/Financial:  Employment Status: retired        Financial Concerns: none   Referral to Financial Worker: No       Does the patient's insurance plan have a 3 day qualifying hospital stay waiver?  Yes      Which insurance plan 3 day waiver is available? Alternative insurance waiver    Will the waiver be used for post-acute placement? Undetermined at this time    Lifestyle & Psychosocial Needs:  Social Determinants of Health     Food Insecurity: Not on file   Depression: Not at risk (2/27/2024)    PHQ-2     PHQ-2 Score: 0   Housing Stability: Not on file   Tobacco Use: Medium Risk (7/25/2024)    Patient History     Smoking Tobacco Use: Former     Smokeless Tobacco Use: Never     Passive Exposure: Never   Financial Resource Strain: Not on file   Alcohol Use: Not on file   Transportation Needs: Not on file   Physical Activity: Not on file   Interpersonal Safety: Low Risk  (3/27/2024)    Interpersonal Safety     Do you feel physically and emotionally safe where you currently live?: Yes     Within the past 12 months, have you been hit, slapped, kicked or otherwise physically hurt by someone?: No     Within the past 12 months, have you been humiliated or emotionally abused in other ways by your partner or ex-partner?: No   Stress: Not on file   Social Connections: Not on file   Health Literacy: Not on file       Functional Status:  Prior to admission patient needed assistance:   Dependent ADLs:: Ambulation-walker, Bathing  Dependent IADLs:: Cleaning, Cooking, Laundry, Shopping, Meal Preparation, Transportation  Assesssment of Functional Status: Not at  functional baseline, Needs placement in a SNF/TCF for rehabilitation    Mental Health Status:          Chemical Dependency Status:              Values/Beliefs:  Spiritual, Cultural Beliefs, Congregation Practices, Values that affect care:                 Additional Information:     79-year-old male into [facility] on 8/4/2024 after arriving to the ER with fever and severe right shoulder pain.  He currently has a PICC in the [facility] receiving ertapenem due to a L4-5 discitis/Osteomyelitis diagnosed 6/19/2024.  He completed Vancomycin On 7/10/24 .      Reports he comes from   [facility] where he is paying to have his room held. Uses a roller-walker and manual wheelchair for mobility. Doesn't drive. Is assisted with I/ADLs by facility staff. Plan is to return to [facility] on discharge. Will need medical transport and is aware of out-of-pocket costs.    NELA COMER, RN/CM

## 2024-08-07 NOTE — PROGRESS NOTES
[facility]    Medicine Progress Note - Hospitalist Service    Date of Admission:  8/4/2024    Assessment & Plan   Mr. Ibrahim is an 79 years old man with past medical history significant for HFpEF, diabetes, gout recent hospitalization 5/2024 for urosepsis and bacteremia due to Klebsiella. Eventually diagnosed with L4-L5 discitis and 6/19.  Was placed on IV antibiotic with vancomycin and ertapenem. He presented to the hospital on 8/4/2024 with acute right shoulder pain which progressed to involve the right elbow, right thumb.  Working diagnosis is gout attack versus septic arthritis. MRI of the right shoulder showed signs of right acromioclavicular inflammation with subdeltoid bursitis, possible early osteomyelitis. Evaluated by orthopedic surgery. Plans for I&D of the right shoulder and right elbow on 8/7.    Right shoulder pain  Right thumb pain  -Right shoulder pain started around 2 days prior to this hospitalization.  -WBC mildly elevated.  CRP is elevated.  -Over the last 2 days, patient developed worsening right elbow pain, right thumb pain.  Swelling of the right forearm.  -Currently on vancomycin.  -Blood cultures remain negative to date.  -Working diagnosis gout attack versus septic arthritis.  Discussed with Dr. Hanson on 8/7, given his history of recent discitis, finding on right shoulder MRI, he believes that its less likely gout attack, therefore he would like to proceed with I&D of the right shoulder and the right elbow.    Plan  -N.p.o. for planned surgical intervention as above.  Cultures will be collected.  Hopefully we can check for crystals --> if suggestive of gout attack--> start systemic steroids given multiple joint involvement.  -Continue colchicine 0.6 mg daily  -Continue current pain management  -Recheck CRP  -Continue vancomycin.  Appreciate infectious disease recommendation    Recent L4-L5 discitis/osteomyelitis  Neck stiffness  -Completed 6 weeks course of IV  vancomycin and ertapenem on 8/5.  -Lumbar CT on 7/26/2024 showed resolution of epidural abscess.  -8/6, patient reported neck stiffness.  No clear tenderness to palpation.      Plan  -Pending MRI of the cervical spine    Diabetes  -At home on Lantus 8 units twice daily, glipizide 2.5 mg daily, semaglutide 40 mg daily  -Fingerstick glucose around 100-200     Plan  -Decrease Lantus to 4 units twice daily given n.p.o. status.  Will most likely increase back to his home dose on 8/8.  -Continue to hold glipizide and semaglutide     Acute kidney injury on CKD  -Baseline creatinine around 1.1  -Currently on presentation 1.3  -Creatinine improved back to baseline.     Plan  -No further intervention     Gout  Possible gout attack  -Uric acid elevated around 9.  Unclear why he was not on any uric acid lowering agents.  -Patient has significant pain of his right thumb, right elbow.  Erythema and warmth of the right forearm and right thumb.  -Possible acute gout attack versus infection.     Plan  -Continue home colchicine  -Would not start any uric acid lowering agents at this point given possible acute gout attack.     Anemia of chronic inflammation with possible component of iron deficiency anemia  -Iron level low at 17 with iron sat index of 9.  Ferritin within normal limits, however, ferritin is an acute phase reactant  -Hemoglobin dropped from around 11-9.2.  No signs of active bleeding.    Plan  -Continue to monitor hemoglobin.  -Would recommend repeating iron studies as an outpatient.    HFpEF  -At home on torsemide 20 mg daily  -Appears euvolemic of his examination.     Plan  -Continue to hold torsemide until after surgical intervention. Possibly resume diuretic on 8/8.     Hypertension  -At home on lisinopril 10 mg daily, torsemide 20 mg daily  -Blood pressures currently around 140/60.  -No indication for strict blood pressure control during this hospitalization.     Plan  -Continue to hold home lisinopril.  Possibly  resume on 8/8.          Diet: NPO per Anesthesia Guidelines for Procedure/Surgery Except for: Meds    DVT Prophylaxis: Enoxaparin (Lovenox) SQ  Barnes Catheter: Not present  Lines: PRESENT      PICC 06/23/24 Single Lumen Right Basilic IV ABX, PICC is ready for use-Site Assessment: WDL      Cardiac Monitoring: None  Code Status: Full Code      Clinically Significant Risk Factors              # Hypoalbuminemia: Lowest albumin = 3 g/dL at 8/4/2024  9:24 PM, will monitor as appropriate     # Hypertension: Noted on problem list    # Chronic heart failure with preserved ejection fraction: heart failure noted on problem list and last echo with EF >50%          # DMII: A1C = 8.0 % (Ref range: <5.7 %) within past 6 months, PRESENT ON ADMISSION  # Overweight: Estimated body mass index is 25.9 kg/m  as calculated from the following:    Height as of this encounter: 1.829 m (6').    Weight as of this encounter: 86.6 kg (191 lb)., PRESENT ON ADMISSION       # Financial/Environmental Concerns: none         Disposition Plan     Medically Ready for Discharge: Anticipated in 2-4 Days             MELISA RUEDA MD  Hospitalist Service  [facility]  Securely message with [email or web address] (more info)  Text page via [employer] Paging/Directory   ______________________________________________________________________    Interval History   Afebrile overnight. Continues to experience significant right shoulder pain, worsening right elbow pain, right thumb pain.  Continues to experience neck stiffness, however, no clear pain.  Denies any chest pain or shortness of breath.  Denies any lightheadedness.  Denies any nausea or vomiting.      Physical Exam   Vital Signs: Temp: 97.8  F (36.6  C) Temp src: Oral BP: (!) 149/72 Pulse: 80   Resp: 15 SpO2: 97 % O2 Device: None (Room air) Oxygen Delivery: 2 LPM  Weight: 191 lbs 0 oz    Physical Exam  Constitutional:       General: He is not in acute distress.  Cardiovascular:      Rate and  Rhythm: Normal rate.   Pulmonary:      Effort: Pulmonary effort is normal. No respiratory distress.      Breath sounds: No wheezing.   Musculoskeletal:      Comments: Erythema, warmth, tenderness to palpation of the base of the right thumb.  Edema of the forearm. Worsening erythema, swelling and tenderness palpation of the right elbow. No erythema or warmth of the right shoulder, however, severely tender to touch. Very limited mobility.   Neurological:      Mental Status: He is alert.   Psychiatric:         Mood and Affect: Mood normal.         Thought Content: Thought content normal.         Judgment: Judgment normal.          Medical Decision Making       55 MINUTES SPENT BY ME on the date of service doing chart review, history, exam, documentation & further activities per the note.      Data     I have personally reviewed the following data over the past 24 hrs:    9.0  \   9.2 (L)   / 359     136 102 16.2 /  194 (H)   3.7 25 1.11 \     Procal: N/A CRP: 161.00 (H) Lactic Acid: N/A         Imaging results reviewed over the past 24 hrs:   Recent Results (from the past 24 hour(s))   MR Shoulder Right w/o & w Contrast    Narrative    EXAM: MR SHOULDER RIGHT W/O and W CONTRAST  LOCATION: [facility]  DATE: 8/6/2024    INDICATION: severe shoulder pain, fever; history of bacteremia;  COMPARISON: None.  TECHNIQUE: Routine. Additional postgadolinium T1 sequences were obtained.  IV CONTRAST: 8.5 mL Gadavist    FINDINGS:    ROTATOR CUFF:  -Supraspinatus: No tendon tear, tendinopathy or fatty atrophy.  -Infraspinatus: No tendon tear, tendinopathy or fatty atrophy.  -Subscapularis: No tendon tear, tendinopathy or fatty atrophy.  -Teres minor: No tendon tear, tendinopathy or fatty atrophy.    ACROMIOCLAVICULAR JOINT:   -Degenerative change at the acromioclavicular joint with undersurface osteophytes. There is a joint effusion with abnormal synovial enhancement at the acromioclavicular joint. This is  contiguous with a subacromial bursal effusion where there is abnormal   synovial enhancement. Abnormal soft tissue edema and enhancement are observed around the acromioclavicular joint and the distal clavicle, involving the distal trapezius muscle and the adjacent anterior deltoid.    LONG HEAD OF BICEPS TENDON:   -No tendinopathy or tear. No tenosynovitis or subluxation.    GLENOHUMERAL JOINT:   Final anatomic detail of the articular cartilage is compromised by motion. There appears to be preservation of the cartilage at this articulation. A small volume of joint fluid is present and there is no abnormal synovial thickening or enhancement. The   glenoid labrum is not well demonstrated. Visualized parts appear grossly normal.    BONES:   -Marrow signal throughout the shoulder is normal except for some subtle abnormal marrow edema and enhancement at the distal clavicle adjacent to arthropathic change and a subcortical cyst.    SOFT TISSUES:   -Soft tissue edema and enhancement in the anterior deltoid and the distal trapezius muscle as discussed above. There is no evidence of abscess or other fluid collection, other than that already described in the subacromial space.       Impression    IMPRESSION:  1.  Abnormal infectious or inflammatory arthritis at the right acromioclavicular joint with subacromial subdeltoid bursitis and bursal effusion. Subtle marrow signal abnormality is observed at the distal clavicle where there is degenerative change.   Whether this is reactive related to the chronic arthropathy or represents early osteomyelitis is not determined. The rotator cuff structures appear intact and there is no evidence of inflammation within the glenohumeral joint space at this time.

## 2024-08-07 NOTE — PLAN OF CARE
Patient vitally stable and on room air this shift.   Alert and oriented x 4.   Assist of 2 oob wit commode for BMs and assistance with urinal at bedside to void. Voiding well.   No pain noted aside from when the right shoulder and elbow are touched (even grazed). Patient reports immediate pain with touching or movement at all, unable to assess overall motor function to that extremity but cms is intact, and pr reports intermittent numbness and tingling to the right hand.   Lidocaine pain patches were applied to the back of his neck as well as his right elbow. In addition, pt received an oral robaxin prn dose for pain- both forms of pain remediation were successful.   Pain stimulator in back was turned off by patient- all pt belonging sent with preop RN, pt discontinue from ed to preop and thus a different unit thereafter.  Missing greater toe and right foot, left foot is missing the middle toe.   Lin Dick RN       Problem: Adult Inpatient Plan of Care  Goal: Absence of Hospital-Acquired Illness or Injury  Outcome: Progressing  Intervention: Prevent Skin Injury  Recent Flowsheet Documentation  Taken 8/7/2024 1443 by Lin Dick, RN  Body Position: position changed independently  Taken 8/7/2024 0800 by Lin Dick, RN  Body Position: position changed independently  Skin Protection: adhesive use limited  Device Skin Pressure Protection: tubing/devices free from skin contact  Intervention: Prevent Infection  Recent Flowsheet Documentation  Taken 8/7/2024 0800 by Lin Dick, RN  Infection Prevention:   cohorting utilized   hand hygiene promoted   rest/sleep promoted   single patient room provided     Problem: Pain Acute  Goal: Optimal Pain Control and Function  Outcome: Progressing  Intervention: Develop Pain Management Plan  Recent Flowsheet Documentation  Taken 8/7/2024 0800 by Lin Dick, RN  Pain Management Interventions: medication (see MAR)  Intervention: Prevent or Manage Pain  Recent  Flowsheet Documentation  Taken 8/7/2024 0800 by Lin Dick, RN  Sensory Stimulation Regulation:   care clustered   television on  Sleep/Rest Enhancement:   awakenings minimized   medication   noise level reduced   regular sleep/rest pattern promoted   room darkened  Complementary Therapy: music therapy provided  Intervention: Optimize Psychosocial Wellbeing  Recent Flowsheet Documentation  Taken 8/7/2024 0800 by Lin Dick, RN  Spiritual Activities Assistance: music provided  Supportive Measures: active listening utilized   Goal Outcome Evaluation:

## 2024-08-07 NOTE — PLAN OF CARE
A/O. VSS on RA. Pain in right shoulder radiating down right arm; prn oxycodone administered. Denies sob. Single lumen PICC patent, saline locked. Call light within reach. Able to make needs known. Calls appropriately. Will continue to monitor.     Goal Outcome Evaluation:    Problem: Adult Inpatient Plan of Care  Goal: Optimal Comfort and Wellbeing  Outcome: Progressing  Intervention: Monitor Pain and Promote Comfort  Recent Flowsheet Documentation  Taken 8/7/2024 0403 by Taylor Phillips RN  Pain Management Interventions: medication (see MAR)     Problem: Pain Acute  Goal: Optimal Pain Control and Function  Outcome: Progressing  Intervention: Develop Pain Management Plan  Recent Flowsheet Documentation  Taken 8/7/2024 0403 by Taylor Phillips RN  Pain Management Interventions: medication (see MAR)  Intervention: Prevent or Manage Pain  Recent Flowsheet Documentation  Taken 8/7/2024 0403 by Taylor Phillips RN  Medication Review/Management: medications reviewed  Taken 8/6/2024 2030 by Taylor Phillips RN  Medication Review/Management: medications reviewed

## 2024-08-08 ENCOUNTER — APPOINTMENT (OUTPATIENT)
Dept: OCCUPATIONAL THERAPY | Facility: CLINIC | Age: 79
DRG: 464 | End: 2024-08-08
Attending: STUDENT IN AN ORGANIZED HEALTH CARE EDUCATION/TRAINING PROGRAM
Payer: COMMERCIAL

## 2024-08-08 LAB
ANION GAP SERPL CALCULATED.3IONS-SCNC: 10 MMOL/L (ref 7–15)
BASOPHILS # BLD AUTO: 0 10E3/UL (ref 0–0.2)
BASOPHILS NFR BLD AUTO: 0 %
BUN SERPL-MCNC: 14.5 MG/DL (ref 8–23)
CALCIUM SERPL-MCNC: 9.2 MG/DL (ref 8.8–10.4)
CHLORIDE SERPL-SCNC: 102 MMOL/L (ref 98–107)
CREAT SERPL-MCNC: 1.06 MG/DL (ref 0.67–1.17)
EGFRCR SERPLBLD CKD-EPI 2021: 71 ML/MIN/1.73M2
EOSINOPHIL # BLD AUTO: 0.1 10E3/UL (ref 0–0.7)
EOSINOPHIL NFR BLD AUTO: 1 %
ERYTHROCYTE [DISTWIDTH] IN BLOOD BY AUTOMATED COUNT: 15.7 % (ref 10–15)
GLUCOSE BLDC GLUCOMTR-MCNC: 104 MG/DL (ref 70–99)
GLUCOSE BLDC GLUCOMTR-MCNC: 186 MG/DL (ref 70–99)
GLUCOSE BLDC GLUCOMTR-MCNC: 220 MG/DL (ref 70–99)
GLUCOSE BLDC GLUCOMTR-MCNC: 224 MG/DL (ref 70–99)
GLUCOSE SERPL-MCNC: 117 MG/DL (ref 70–99)
HCO3 SERPL-SCNC: 27 MMOL/L (ref 22–29)
HCT VFR BLD AUTO: 30.7 % (ref 40–53)
HGB BLD-MCNC: 9.6 G/DL (ref 13.3–17.7)
HOLD SPECIMEN: NORMAL
IMM GRANULOCYTES # BLD: 0.1 10E3/UL
IMM GRANULOCYTES NFR BLD: 1 %
LYMPHOCYTES # BLD AUTO: 1.1 10E3/UL (ref 0.8–5.3)
LYMPHOCYTES NFR BLD AUTO: 12 %
MCH RBC QN AUTO: 27.5 PG (ref 26.5–33)
MCHC RBC AUTO-ENTMCNC: 31.3 G/DL (ref 31.5–36.5)
MCV RBC AUTO: 88 FL (ref 78–100)
MONOCYTES # BLD AUTO: 1 10E3/UL (ref 0–1.3)
MONOCYTES NFR BLD AUTO: 11 %
NEUTROPHILS # BLD AUTO: 6.5 10E3/UL (ref 1.6–8.3)
NEUTROPHILS NFR BLD AUTO: 74 %
NRBC # BLD AUTO: 0 10E3/UL
NRBC BLD AUTO-RTO: 0 /100
PLATELET # BLD AUTO: 400 10E3/UL (ref 150–450)
POTASSIUM SERPL-SCNC: 4.1 MMOL/L (ref 3.4–5.3)
RBC # BLD AUTO: 3.49 10E6/UL (ref 4.4–5.9)
SODIUM SERPL-SCNC: 139 MMOL/L (ref 135–145)
VANCOMYCIN SERPL-MCNC: 13 UG/ML
WBC # BLD AUTO: 8.8 10E3/UL (ref 4–11)

## 2024-08-08 PROCEDURE — 80048 BASIC METABOLIC PNL TOTAL CA: CPT | Performed by: STUDENT IN AN ORGANIZED HEALTH CARE EDUCATION/TRAINING PROGRAM

## 2024-08-08 PROCEDURE — 250N000013 HC RX MED GY IP 250 OP 250 PS 637: Performed by: STUDENT IN AN ORGANIZED HEALTH CARE EDUCATION/TRAINING PROGRAM

## 2024-08-08 PROCEDURE — 250N000012 HC RX MED GY IP 250 OP 636 PS 637: Performed by: EMERGENCY MEDICINE

## 2024-08-08 PROCEDURE — 85025 COMPLETE CBC W/AUTO DIFF WBC: CPT | Performed by: STUDENT IN AN ORGANIZED HEALTH CARE EDUCATION/TRAINING PROGRAM

## 2024-08-08 PROCEDURE — 120N000001 HC R&B MED SURG/OB

## 2024-08-08 PROCEDURE — 250N000011 HC RX IP 250 OP 636: Performed by: STUDENT IN AN ORGANIZED HEALTH CARE EDUCATION/TRAINING PROGRAM

## 2024-08-08 PROCEDURE — 80202 ASSAY OF VANCOMYCIN: CPT | Performed by: STUDENT IN AN ORGANIZED HEALTH CARE EDUCATION/TRAINING PROGRAM

## 2024-08-08 PROCEDURE — 250N000012 HC RX MED GY IP 250 OP 636 PS 637: Performed by: STUDENT IN AN ORGANIZED HEALTH CARE EDUCATION/TRAINING PROGRAM

## 2024-08-08 PROCEDURE — 250N000013 HC RX MED GY IP 250 OP 250 PS 637: Performed by: HOSPITALIST

## 2024-08-08 PROCEDURE — 97166 OT EVAL MOD COMPLEX 45 MIN: CPT | Mod: GO

## 2024-08-08 PROCEDURE — 97535 SELF CARE MNGMENT TRAINING: CPT | Mod: GO

## 2024-08-08 PROCEDURE — 36415 COLL VENOUS BLD VENIPUNCTURE: CPT | Performed by: STUDENT IN AN ORGANIZED HEALTH CARE EDUCATION/TRAINING PROGRAM

## 2024-08-08 PROCEDURE — 99233 SBSQ HOSP IP/OBS HIGH 50: CPT | Performed by: STUDENT IN AN ORGANIZED HEALTH CARE EDUCATION/TRAINING PROGRAM

## 2024-08-08 PROCEDURE — 99232 SBSQ HOSP IP/OBS MODERATE 35: CPT | Mod: GC | Performed by: INTERNAL MEDICINE

## 2024-08-08 PROCEDURE — 250N000013 HC RX MED GY IP 250 OP 250 PS 637: Performed by: EMERGENCY MEDICINE

## 2024-08-08 PROCEDURE — 250N000011 HC RX IP 250 OP 636: Performed by: EMERGENCY MEDICINE

## 2024-08-08 RX ORDER — LISINOPRIL 10 MG/1
10 TABLET ORAL EVERY 24 HOURS
Status: DISCONTINUED | OUTPATIENT
Start: 2024-08-08 | End: 2024-08-16 | Stop reason: HOSPADM

## 2024-08-08 RX ORDER — VANCOMYCIN HYDROCHLORIDE 1 G/200ML
1000 INJECTION, SOLUTION INTRAVENOUS EVERY 12 HOURS
Status: DISCONTINUED | OUTPATIENT
Start: 2024-08-08 | End: 2024-08-11

## 2024-08-08 RX ORDER — TORSEMIDE 5 MG/1
10 TABLET ORAL DAILY
Status: DISCONTINUED | OUTPATIENT
Start: 2024-08-08 | End: 2024-08-16 | Stop reason: HOSPADM

## 2024-08-08 RX ADMIN — VANCOMYCIN HYDROCHLORIDE 1000 MG: 1 INJECTION, SOLUTION INTRAVENOUS at 12:12

## 2024-08-08 RX ADMIN — HYDROMORPHONE HYDROCHLORIDE 0.4 MG: 1 INJECTION, SOLUTION INTRAMUSCULAR; INTRAVENOUS; SUBCUTANEOUS at 20:32

## 2024-08-08 RX ADMIN — COLCHICINE 0.6 MG: 0.6 TABLET ORAL at 10:17

## 2024-08-08 RX ADMIN — LIDOCAINE 2 PATCH: 4 PATCH TOPICAL at 12:08

## 2024-08-08 RX ADMIN — Medication 2 CAPSULE: at 10:16

## 2024-08-08 RX ADMIN — CARBOXYMETHYLCELLULOSE SODIUM 1 DROP: 10 GEL OPHTHALMIC at 10:20

## 2024-08-08 RX ADMIN — GABAPENTIN 300 MG: 300 CAPSULE ORAL at 10:15

## 2024-08-08 RX ADMIN — ACETAMINOPHEN 650 MG: 325 TABLET ORAL at 18:57

## 2024-08-08 RX ADMIN — OXYCODONE HYDROCHLORIDE 5 MG: 5 TABLET ORAL at 10:15

## 2024-08-08 RX ADMIN — INSULIN GLARGINE 4 UNITS: 100 INJECTION, SOLUTION SUBCUTANEOUS at 22:57

## 2024-08-08 RX ADMIN — SODIUM BICARBONATE 1950 MG: 650 TABLET ORAL at 20:45

## 2024-08-08 RX ADMIN — VANCOMYCIN HYDROCHLORIDE 1000 MG: 1 INJECTION, SOLUTION INTRAVENOUS at 23:05

## 2024-08-08 RX ADMIN — INSULIN ASPART 2 UNITS: 100 INJECTION, SOLUTION INTRAVENOUS; SUBCUTANEOUS at 18:02

## 2024-08-08 RX ADMIN — INSULIN GLARGINE 4 UNITS: 100 INJECTION, SOLUTION SUBCUTANEOUS at 10:21

## 2024-08-08 RX ADMIN — OXYCODONE HYDROCHLORIDE 5 MG: 5 TABLET ORAL at 17:29

## 2024-08-08 RX ADMIN — INSULIN ASPART 1 UNITS: 100 INJECTION, SOLUTION INTRAVENOUS; SUBCUTANEOUS at 12:10

## 2024-08-08 RX ADMIN — SODIUM BICARBONATE 1950 MG: 650 TABLET ORAL at 10:22

## 2024-08-08 RX ADMIN — ENOXAPARIN SODIUM 40 MG: 100 INJECTION SUBCUTANEOUS at 12:08

## 2024-08-08 RX ADMIN — SODIUM BICARBONATE 1950 MG: 650 TABLET ORAL at 17:28

## 2024-08-08 RX ADMIN — ASPIRIN 81 MG: 81 TABLET, COATED ORAL at 10:13

## 2024-08-08 RX ADMIN — GABAPENTIN 300 MG: 300 CAPSULE ORAL at 23:00

## 2024-08-08 RX ADMIN — GABAPENTIN 300 MG: 300 CAPSULE ORAL at 18:13

## 2024-08-08 RX ADMIN — LISINOPRIL 10 MG: 10 TABLET ORAL at 17:29

## 2024-08-08 RX ADMIN — FINASTERIDE 5 MG: 5 TABLET, FILM COATED ORAL at 10:13

## 2024-08-08 RX ADMIN — TORSEMIDE 10 MG: 5 TABLET ORAL at 17:30

## 2024-08-08 RX ADMIN — DICLOFENAC SODIUM 4 G: 10 GEL TOPICAL at 22:59

## 2024-08-08 ASSESSMENT — ACTIVITIES OF DAILY LIVING (ADL)
ADLS_ACUITY_SCORE: 49
ADLS_ACUITY_SCORE: 62
ADLS_ACUITY_SCORE: 59
ADLS_ACUITY_SCORE: 49
ADLS_ACUITY_SCORE: 59
ADLS_ACUITY_SCORE: 60
ADLS_ACUITY_SCORE: 59
ADLS_ACUITY_SCORE: 59
ADLS_ACUITY_SCORE: 60
ADLS_ACUITY_SCORE: 59
ADLS_ACUITY_SCORE: 60
ADLS_ACUITY_SCORE: 49
ADLS_ACUITY_SCORE: 49
ADLS_ACUITY_SCORE: 62
ADLS_ACUITY_SCORE: 59
ADLS_ACUITY_SCORE: 60
ADLS_ACUITY_SCORE: 49

## 2024-08-08 NOTE — OP NOTE
DATE OF SURGERY: 8/7/2024  PREOPERATIVE DIAGNOSIS: Possible right AC joint septic arthritis, possible right elbow septic arthritis, possible right septic olecranon bursitis, possible septic right MCP joint  POSTOPERATIVE DIAGNOSIS: Same as above versus acute gout flare     PROCEDURE: Irrigation and debridement of the right AC joint, right olecranon bursa, right elbow joint, right thumb MCP joint.  Open distal clavicle excision, right.     SURGEON: Terence Hanson MD  ASSISTANT: Karuna Romero PA-C. A skilled assistant was necessary for all portions of the surgery due to its complexity including retractionto allow for exposure of infection areas as well as stage closing to allow for expediting the procedure.    ANESTHESIA: General  TOURNIQUET TIME:   None  ESTIMATED BLOOD LOSS: 50 cc  SPECIMENS: Multiple tissue samples and swabs were sent for cultures.  2 fluid samples 1 from the AC joint and 1 from the olecranon bursa were sent for crystal analysis..   DRAINS:  None.   COMPLICATIONS:  None apparent.     INTRAOPERATIVE FINDINGS: Cloudy fluid with associated gouty tophi in the septic bursa, cloudy fluid in the elbow joint, normal appearing fluid in the AC joint, and cloudy appearing fluid in the thumb MCP joint. No gross purulence.    BRIEF HISTORY:  79 year old male who recently recovered from a lumbar osteomyelitis and developed significant shoulder pain a few days later.  He was came to the ER where there was concern for infection given the fever and elevated CRP.  He had pain at the AC joint, the elbow, and the thumb MCP joint.  His pain was significant and limiting motion.  He had swelling throughout the upper extremity.  A DVT ultrasound was negative.  Given his recent history of infection as well as the elevated CRP and point and exquisite tenderness in these areas with associated swelling and redness there was obvious concern for septic arthritis.  MRI showed inflammatory and possibly infectious material in  the AC joint but none of the glenohumeral joint.  Given his clinical picture, decision was made to proceed with irrigation and debridement for culture sampling and drainage of these and possibly infected areas.  Consideration was given to an acute gout flare, however with so many areas involved the seem less likely.  Note, he did have a PICC line in this right upper extremity which has been in place for about 5 weeks.    DESCRIPTION OF PROCEDURE:  The patient was identified in the preoperative holding area.  The informed consent was reviewed. The operative site was identified and marked. The patient was brought to the operating room and general anesthesia induced.  The right upper extremities prepped and draped in sterile fashion.  IV antibiotics were given prior to incision.  A surgical timeout was completed.    Curvilinear incision was made just posterior to lateral condyle this was taken down to the muscle fascia.  Dissection was then carried posterior in the olecranon bursa.  The bursa was identified opened and drained.  There was cloudy fluid as well as what appeared most consistent with a gouty tophi though quite fluid and not solid in nature.  Samples were taken in this area and sent to lab for culture.  Excisional debridement was performed in this area of the subcutaneous tissues and the bursal tissues using a rongeur as well as Metzenbaum scissors.  This was performed sharply.  No bone was debrided.  Attention was then turned to the elbow joint.  This was easily open with a small incision at the radiocarpal joint.  Sharp excisional debridement was performed of the underlying joint capsule.  No bone was debrided.  Cloudy fluid was encountered and sample for the lab.  Both these areas were then thoroughly irrigated with 1 L of saline.  Attention was turned to the AC joint.  An incision was made directly over the AC joint this was taken down to bone.  Rongeur was used to collect samples of tissue from the  joint as well as the associated distal clavicle bone and this was sent to lab for culture.  A distal clavicle excision was then completed removing about 6 to 7 mm of the distal clavicle.  This area was then thoroughly irrigated including now with access to the subacromial bursa.  Excisional debridement was performed using a rongeur of the subcutaneous tissues, the joint tissues, and distal clavicle bone.  The thumb MCP joint was opened based on clinical picture.  A 2 cm incision was made just over the radial side of the dorsal aspect of the MCP joint taken down through the extensor zelaya just radial to the extensor tendon.  Suspected gouty tophi and cloudy fluid were encountered in this joint.  Samples were taken and sent to the lab.  A rongeur was used for excisional debridement of the capsular tissues at the thumb MCP joint.  The thumb was thoroughly irrigated.  Capsular tissue was closed with a Monocryl and skin was closed in all locations with nylon.  In the elbow, the elbow joint was closed with Monocryl and again skin with nylon.  A 7 Khmer drain was left in the olecranon bursa and attached to a TONYA drain on suction.  Soft dressings were applied followed by an Ace wrap to the upper extremity in a sling.  All sponge and needle counts were correct at the end of the case.      POSTOPERATIVE PLAN:    Patient be nonweightbearing with his right upper extremity.  Dressing change in 2 days with drain removal from the right elbow.  We will follow cultures and also evaluate the crystal samples which were sent from the OR.  Continue on antibiotics.  If cultures are coming back negative and crystals are identified, may consider systemic steroids per Hospitalist service.  He will need follow-up in 2 weeks for suture removal.      Terence Hanson MD

## 2024-08-08 NOTE — PROGRESS NOTES
08/08/24 0925   Appointment Info   Signing Clinician's Name / Credentials (OT) Tasha London, CANDICER/L, CLT   Rehab Comments (OT) OT eval   Living Environment   People in Home other (see comments)  (pt states [facility])   Current Living Arrangements assisted living  (per pt)   Self-Care   Usual Activity Tolerance good   Current Activity Tolerance moderate   Activity/Exercise/Self-Care Comment independent with  ADL, except what he receives assist with at [facility]   General Information   Onset of Illness/Injury or Date of Surgery 08/04/24   Referring Physician Dr. Judd   Patient/Family Therapy Goal Statement (OT) was ok to try to get to EOB   Additional Occupational Profile Info/Pertinent History of Current Problem s/p I&D of shoulder AC joint/elbow/thumb   Existing Precautions/Restrictions shoulder;other (see comments)   Right Upper Extremity (Weight-bearing Status) non weight-bearing (NWB)   General Observations and Info NWB surgical UE and ROM as tolerated per ortho note   Cognitive Status Examination   Orientation Status orientation to person, place and time   Affect/Mental Status (Cognitive) WFL  (may have some deficits)   Visual Perception   Visual Impairment/Limitations WFL   Sensory   Sensory Quick Adds other (see comments)   Sensory Comments impaired due to UE surgery   Pain Assessment   Patient Currently in Pain No   Posture   Posture not impaired   Range of Motion Comprehensive   Comment, General Range of Motion UE immobilized   Strength Comprehensive (MMT)   Comment, General Manual Muscle Testing (MMT) Assessment UE immobilized   Muscle Tone Assessment   Muscle Tone Quick Adds No deficits were identified   Coordination   Fine Motor Coordination decreased   Bed Mobility   Comment (Bed Mobility) dependent   Transfers   Transfer Comments dependent   Balance   Balance Comments N/T   Activities of Daily Living   BADL Assessment/Intervention upper body dressing;lower body dressing   Upper Body Dressing  Assessment/Training   Ross Level (Upper Body Dressing) maximum assist (25% patient effort)   Lower Body Dressing Assessment/Training   Ross Level (Lower Body Dressing) maximum assist (25% patient effort)   Clinical Impression   Criteria for Skilled Therapeutic Interventions Met (OT) Yes, treatment indicated   OT Diagnosis Decreased ADL indep/safety   Influenced by the following impairments s/p I&D for R UE   OT Problem List-Impairments impacting ADL activity tolerance impaired;balance;coordination;range of motion (ROM);strength;post-surgical precautions;cognition;fear & anxiety;flexibility;mobility;pain   Assessment of Occupational Performance 5 or more Performance Deficits   Identified Performance Deficits dressing, bathing, bed mob, grooming   Planned Therapy Interventions (OT) ADL retraining;bed mobility training;ROM;strengthening;transfer training;progressive activity/exercise;home program guidelines   Clinical Decision Making Complexity (OT) detailed assessment/moderate complexity   Risk & Benefits of therapy have been explained patient;risks/benefits reviewed;care plan/treatment goals reviewed   OT Total Evaluation Time   OT Eval, Moderate Complexity Minutes (90875) 10   Therapy Certification   Medical Diagnosis TSA   Start of Care Date 08/08/24   Certification date from 08/08/24   Certification date to 09/07/24   OT Goals   Therapy Frequency (OT) One time eval and treatment   OT Predicted Duration/Target Date for Goal Attainment 08/08/24   OT Goals Bed Mobility;Toilet Transfer/Toileting   OT: Bed Mobility Moderate assist   OT: Toilet Transfer/Toileting Maximum assist;using adaptive equipment;toilet transfer   Interventions   Interventions Quick Adds Self-Care/Home Management   Self-Care/Home Management   Self-Care/Home Mgmt/ADL, Compensatory, Meal Prep Minutes (45186) 20   Symptoms Noted During/After Treatment (Meal Preparation/Planning Training) none   Treatment Detail/Skilled Intervention  Taught pt about sling, and importance of movement. Pt needed max A for adjusting sling and positioning/elevating. Pt is max A for bed mob. Pt needed max A for scooting to EOB. Pt educ in leg  and pt appeared hesitant to use d/t increased pain.Pain continued and pt refused to move any further and educ in LE mvmts/UE mvmts and blood clot prevention/etc.  Educated in safe ADL.  Answered questions from pt. RN informed of extreme R knee pain.   OT Discharge Planning   OT Plan any bed mob, transfers as able, leg    OT Discharge Recommendation (DC Rec) (S)  Transitional Care Facility   OT Rationale for DC Rec increased pain and refusing further bed mob   OT Brief overview of current status dependent for transfers at this time   Total Session Time   Timed Code Treatment Minutes 20   Total Session Time (sum of timed and untimed services) 30

## 2024-08-08 NOTE — ANESTHESIA CARE TRANSFER NOTE
Patient: Lance Ibrahim    Procedure: Procedure(s):  IRRIGATION AND DEBRIDEMENT, SHOULDER  EXCISION, CLAVICLE, DISTAL  IRRIGATION AND DEBRIDEMENT, ELBOW AND THUMB       Diagnosis: Septic arthritis of right acromioclavicular joint (H) [M00.9]  Diagnosis Additional Information: No value filed.    Anesthesia Type:   General     Note:    Oropharynx: oropharynx clear of all foreign objects  Level of Consciousness: awake  Oxygen Supplementation: face mask  Level of Supplemental Oxygen (L/min / FiO2): 8  Independent Airway: airway patency satisfactory and stable  Dentition: dentition unchanged  Vital Signs Stable: post-procedure vital signs reviewed and stable  Report to RN Given: handoff report given  Patient transferred to: PACU    Handoff Report: Identifed the Patient, Identified the Reponsible Provider, Reviewed the pertinent medical history, Discussed the surgical course, Reviewed Intra-OP anesthesia mangement and issues during anesthesia, Set expectations for post-procedure period and Allowed opportunity for questions and acknowledgement of understanding      Vitals:  Vitals Value Taken Time   BP     Temp     Pulse 93 08/07/24 1910   Resp 16 08/07/24 1910   SpO2 100 % 08/07/24 1910   Vitals shown include unfiled device data.    Electronically Signed By: RANJAN Silva CRNA  August 7, 2024  7:12 PM

## 2024-08-08 NOTE — PROGRESS NOTES
Notified: Remote MD Ferrer  Time: 2152  Patient name/room: #[id number]  Reason to page: hypertensive. 179/81  Paged by:  Fiordaliza MARTÍNEZN Hydralazine injection if SBP>175

## 2024-08-08 NOTE — PROGRESS NOTES
INFECTIOUS DISEASE FOLLOW UP NOTE    Date: 08/08/2024   CHIEF COMPLAINT:   Chief Complaint   Patient presents with    Shoulder Pain    Fever        ASSESSMENT:    Comorbid conditions affecting immune system: Type II Diabetes mellitus   Active Problems:    Acute pain of right shoulder    Fever, unspecified fever cause     Right Shoulder Pain  Concern for right shoulder septic Arthritis  -acute onset  -CRP elevated on admission 142.WBC 11.8 on admission, improved  -possible gout flare; now with right thumb pain, right elbow pain/bursitis  -blood culture negative to date  -mri ordered of shoulder showing AC joint buritis/inflammation.   -seen by ortho, s/p irrigation and debridement of right shoulder, clavicle, elbow, and thumb 8/7/2024, cultures pending   -synovial fluid right elbow showing monosodium urate crystals     Recent L4-L5 discitis/osteomyelitis  -s/p aspiration on 6/19 - culture negative  -treated with 6 weeks IV vancomycin and Ertapenem - completed  -Repeat lumbar CT on 7/26/2024 showed resolution of epidural abscess     Recommendations:   -Continue vancomycin  -Await surgical cultures   -monosodium urate crystals identified on right elbow synovial fluid - may represent possible acute gout flair      Spencer Edge, DO PGY 3, [facility] Medicine Residency    I agree with recommendations above. See addendum below  Koko Wolfe MD    ______________________________________________________________________    SUBJECTIVE / INTERVAL HISTORY:  -New onset right knee pain over last 24 hours. Tender with any motion of the right knee joint. He denies fever, chills, nausea, vomiting shortness of breath and chest pain. Right shoulder and elbow pain improving. Continues to have right thumb pain.     ROS: All other systems negative except as listed above.    SH/FH/Habits/PMH reviewed and unchanged.    OBJECTIVE:  BP (!) 163/74 (BP Location: Left arm)   Pulse 92   Temp 97.8  F (36.6  C) (Oral)   Resp 17   Ht  1.829 m (6')   Wt 86.6 kg (191 lb)   SpO2 91%   BMI 25.90 kg/m       Resp: 17      Vital Signs  Temp: 97.8  F (36.6  C)  Temp src: Oral  Resp: 17  Pulse: 92  Pulse Rate Source: Monitor  BP: (!) 163/74  BP Location: Left arm    Temp (24hrs), Av  F (36.7  C), Min:97.6  F (36.4  C), Max:98.9  F (37.2  C)      GEN: No acute distress.    RESPIRATORY:  Normal breathing pattern. Clear to auscultation bilaterally  CARDIOVASCULAR:  Regular rate and rhythm. No murmur, click, gallop or rub.   ABDOMEN:  Soft, normal bowel sounds, non-tender,   EXTREMITIES: Right shoulder dressing in place, clean and dry. Drain in place, right arm in sling. Right knee tender to palpation on lateral aspect of patella. Exquisite tenderness to motion of right knee joint.   SKIN/HAIR/NAILS:  No rashes    Pertinent labs:  CRP Inflammation   Date Value Ref Range Status   2020 48.3 (H) 0.0 - 8.0 mg/L Final     CRP   Date Value Ref Range Status   2022 4.7 (H) 0.0 - <0.8 mg/dL Final      CBC RESULTS:   Recent Labs   Lab Test 24  0618   WBC 8.8   RBC 3.49*   HGB 9.6*   HCT 30.7*   MCV 88   MCH 27.5   MCHC 31.3*   RDW 15.7*         Last Comprehensive Metabolic Panel:  Sodium   Date Value Ref Range Status   2024 139 135 - 145 mmol/L Final   2021 139 133 - 144 mmol/L Final     Potassium   Date Value Ref Range Status   2024 4.1 3.4 - 5.3 mmol/L Final   2022 4.6 3.5 - 5.0 mmol/L Final   2021 4.7 3.4 - 5.3 mmol/L Final     Chloride   Date Value Ref Range Status   2024 102 98 - 107 mmol/L Final   2022 108 (H) 98 - 107 mmol/L Final   2021 107 94 - 109 mmol/L Final     Carbon Dioxide   Date Value Ref Range Status   2021 27 20 - 32 mmol/L Final     Carbon Dioxide (CO2)   Date Value Ref Range Status   2024 27 22 - 29 mmol/L Final   2022 22 22 - 31 mmol/L Final     Anion Gap   Date Value Ref Range Status   2024 10 7 - 15 mmol/L Final   2022 8 5 - 18 mmol/L  Final   04/29/2021 5 3 - 14 mmol/L Final     Glucose   Date Value Ref Range Status   08/08/2024 117 (H) 70 - 99 mg/dL Final   08/27/2022 131 (H) 70 - 125 mg/dL Final   04/29/2021 212 (H) 70 - 99 mg/dL Final     GLUCOSE BY METER POCT   Date Value Ref Range Status   08/08/2024 104 (H) 70 - 99 mg/dL Final     Urea Nitrogen   Date Value Ref Range Status   08/08/2024 14.5 8.0 - 23.0 mg/dL Final   08/27/2022 16 8 - 28 mg/dL Final   04/29/2021 27 7 - 30 mg/dL Final     Creatinine   Date Value Ref Range Status   08/08/2024 1.06 0.67 - 1.17 mg/dL Final   04/29/2021 1.19 0.66 - 1.25 mg/dL Final     GFR Estimate   Date Value Ref Range Status   08/08/2024 71 >60 mL/min/1.73m2 Final     Comment:     eGFR calculated using 2021 CKD-EPI equation.   04/29/2021 59 (L) >60 mL/min/[1.73_m2] Final     Comment:     Non  GFR Calc  Starting 12/18/2018, serum creatinine based estimated GFR (eGFR) will be   calculated using the Chronic Kidney Disease Epidemiology Collaboration   (CKD-EPI) equation.       GFR, ESTIMATED POCT   Date Value Ref Range Status   12/03/2021 15 (L) >60 mL/min/1.73m2 Final     Calcium   Date Value Ref Range Status   08/08/2024 9.2 8.8 - 10.4 mg/dL Final     Comment:     Reference intervals for this test were updated on 7/16/2024 to reflect our healthy population more accurately. There may be differences in the flagging of prior results with similar values performed with this method. Those prior results can be interpreted in the context of the updated reference intervals.   04/29/2021 9.3 8.5 - 10.1 mg/dL Final        MICROBIOLOGY DATA:  Personally reviewed.  7-Day Micro Results       Collected Updated Procedure Result Status      08/07/2024 1848 08/07/2024 1904 Anaerobic Bacterial Culture Routine [[id number]]   Tissue from Thumb, Right    In process Component Value   No component results               08/07/2024 1848 08/07/2024 2236 Gram Stain [[id number]]   Tissue from Thumb, Right    Final result  Component Value   GS Culture See corresponding culture for results   Gram Stain Result No organisms seen   Gram Stain Result 1+ WBC seen            08/07/2024 1848 08/07/2024 1905 Tissue Aerobic Bacterial Culture Routine [[id number]]   Tissue from Thumb, Right    In process Component Value   No component results               08/07/2024 1835 08/07/2024 1900 Anaerobic Bacterial Culture Routine [[id number]]   Tissue from Clavicle, Right    In process Component Value   No component results               08/07/2024 1835 08/07/2024 2242 Gram Stain [[id number]]   Tissue from Clavicle, Right    Final result Component Value   GS Culture See corresponding culture for results   Gram Stain Result No organisms seen   Gram Stain Result 3+ WBC seen   Predominantly PMNs            08/07/2024 1835 08/07/2024 1900 Tissue Aerobic Bacterial Culture Routine [[id number]]   Tissue from Clavicle, Right    In process Component Value   No component results               08/07/2024 1828 08/07/2024 1901 Anaerobic Bacterial Culture Routine [[id number]]   Wound from Elbow, Right    In process Component Value   No component results               08/07/2024 1828 08/07/2024 2223 Gram Stain [[id number]]   Wound from Elbow, Right    Final result Component Value   GS Culture See corresponding culture for results   Gram Stain Result No organisms seen   Gram Stain Result 1+ WBC seen            08/07/2024 1828 08/07/2024 1901 Wound Aerobic Bacterial Culture Routine [[id number]]   Wound from Elbow, Right    In process Component Value   No component results               08/07/2024 1824 08/07/2024 1901 Anaerobic Bacterial Culture Routine [[id number]]   Wound from Elbow, Right    In process Component Value   No component results               08/07/2024 1824 08/07/2024 2225 Gram Stain [[id number]]   Wound from Elbow, Right    Final result Component Value   GS Culture See corresponding culture for results   Gram Stain Result No organisms  seen   Gram Stain Result 1+ WBC seen            08/07/2024 1824 08/07/2024 1901 Wound Aerobic Bacterial Culture Routine [[id number]]   Wound from Elbow, Right    In process Component Value   No component results               08/07/2024 1823 08/07/2024 2014 Crystal ID Synovial Fluid [[id number]]   (Abnormal)   Synovial fluid from Elbow, Right    Final result Component Value   Crystals Analysis Positive for extracellular crystals, consistent with monosodium urate crystals.            08/07/2024 1823 08/07/2024 2028 Cell count with differential fluid [[id number]]    (Abnormal)   Synovial fluid from Elbow, Right    Final result Component Value   No component results            08/07/2024 1823 08/07/2024 2025 Cell Count Body Fluid [[id number]]    (Abnormal)   Synovial fluid from Elbow, Right    Final result Component Value   Color Red   Clarity Turbid   Cell Count Fluid Source Elbow, Right   Right Olecranon Bursa Fluid #2   Total Nucleated Cells --   Specimen clotted, result unavailable.            08/07/2024 1823 08/07/2024 2028 Differential Body Fluid [[id number]]    Synovial fluid from Elbow, Right    Final result Component Value   % Neutrophils --   % Lymphocytes --   % Monocyte/Macrophages --            08/07/2024 1820 08/07/2024 1900 Anaerobic Bacterial Culture Routine [[id number]]   Tissue from Elbow, Right    In process Component Value   No component results               08/07/2024 1820 08/07/2024 2239 Gram Stain [[id number]]   Tissue from Elbow, Right    Final result Component Value   GS Culture See corresponding culture for results   Gram Stain Result No organisms seen   Gram Stain Result 1+ WBC seen            08/07/2024 1820 08/07/2024 1900 Tissue Aerobic Bacterial Culture Routine [[id number]]   Tissue from Elbow, Right    In process Component Value   No component results               08/07/2024 1818 08/07/2024 1902 Anaerobic Bacterial Culture Routine [[id number]]   Wound from Elbow,  Right    In process Component Value   No component results               08/07/2024 1818 08/07/2024 2234 Gram Stain [[id number]]   Wound from Elbow, Right    Final result Component Value   GS Culture See corresponding culture for results   Gram Stain Result No organisms seen   Gram Stain Result 1+ WBC seen            08/07/2024 1818 08/07/2024 1902 Wound Aerobic Bacterial Culture Routine [[id number]]   Wound from Elbow, Right    In process Component Value   No component results               08/07/2024 1815 08/07/2024 2130 Cell count with differential fluid [[id number]]    (Abnormal)   Synovial fluid from Elbow, Right    Final result Component Value   No component results            08/07/2024 1815 08/07/2024 1935 Crystal ID Synovial Fluid [[id number]]   (Abnormal)   Synovial fluid from Elbow, Right    Final result Component Value   Crystals Analysis Positive for intracellular crystals, consistent with monosodium urate crystals.            08/07/2024 1815 08/07/2024 2129 Cell Count Body Fluid [[id number]]    (Abnormal)   Synovial fluid from Elbow, Right    Final result Component Value   Color Red   Clarity Cloudy   Cell Count Fluid Source Elbow, Right   Total Nucleated Cells --   Specimen Clotted- Cell count not performed.            08/07/2024 1815 08/07/2024 2130 Differential Body Fluid [[id number]]    Synovial fluid from Elbow, Right    Final result Component Value   No component results            08/04/2024 2124 08/07/2024 2146 Blood Culture Peripheral Blood [[id number]]   Peripheral Blood    Preliminary result Component Value   Culture No growth after 3 days  [P]                         RADIOLOGY:  Personally Reviewed.  No results found for this or any previous visit (from the past 24 hour(s)).    Active Problems:    Acute pain of right shoulder    Fever, unspecified fever cause     =========================  Attestation:  This patient has been seen and evaluated by me, Koko Wolfe MD.   Discussed with the Resident and agree with the findings and recommendations in this note.     I have reviewed today's Medications, Vital Signs, and Labs.    Surgery and debridement yesterday of right shoulder, elbow, and thumb. MSU crystals seen in elbow fluid. Pain better today in shoulder and elbow. New pain in right knee today. He says it feels hot and swollen    No obvious effusion of right knee on my exam.     I left a message with ortho regarding new knee pain. I will order an u/s to see if there is fluid to aspirate.     Watch cultures; if no growth would plan on stopping vancomycin soon.     Koko Wolfe MD  [facility]  Direct messaging: [facility] Paging   On-Call ID provider: [phone], option: [phone]

## 2024-08-08 NOTE — ANESTHESIA POSTPROCEDURE EVALUATION
Patient: Lance Ibrahim    Procedure: Procedure(s):  IRRIGATION AND DEBRIDEMENT, SHOULDER  EXCISION, CLAVICLE, DISTAL  IRRIGATION AND DEBRIDEMENT, ELBOW AND THUMB       Anesthesia Type:  General    Note:  Disposition: Outpatient   Postop Pain Control: Uneventful            Sign Out: Well controlled pain   PONV: No   Neuro/Psych: Uneventful            Sign Out: Acceptable/Baseline neuro status   Airway/Respiratory: Uneventful            Sign Out: Acceptable/Baseline resp. status   CV/Hemodynamics: Uneventful            Sign Out: Acceptable CV status; No obvious hypovolemia; No obvious fluid overload   Other NRE: NONE   DID A NON-ROUTINE EVENT OCCUR? No    Event details/Postop Comments:  Patient recovering comfortably.        Last vitals:  Vitals Value Taken Time   /79 08/07/24 2000   Temp 36.8  C (98.2  F) 08/07/24 1908   Pulse 81 08/07/24 2002   Resp 18 08/07/24 2002   SpO2 98 % 08/07/24 2002   Vitals shown include unfiled device data.    Electronically Signed By: Beto Hinton DO  August 7, 2024  8:44 PM

## 2024-08-08 NOTE — PROGRESS NOTES
[facility]    Medicine Progress Note - Hospitalist Service    Date of Admission:  8/4/2024    Assessment & Plan   Mr. Ibrahim is an 79 years old man with past medical history significant for HFpEF, diabetes, gout recent hospitalization 5/2024 for urosepsis and bacteremia due to Klebsiella. Eventually diagnosed with L4-L5 discitis and 6/19.  Was placed on IV antibiotic with vancomycin and ertapenem. He presented to the hospital on 8/4/2024 with acute right shoulder pain which progressed to involve the right elbow, right thumb.  Working diagnosis is gout attack versus septic arthritis. MRI of the right shoulder showed signs of right acromioclavicular inflammation with subdeltoid bursitis, possible early osteomyelitis. Evaluated by orthopedic surgery. Plans for I&D of the right shoulder and right elbow on 8/7.    Changes today:  -Still with some cervical pain, MRI C-spine still pending, will work today to have completed  -Will hold off on steroids until septic arthritis can officially be ruled out  -PT/OT consult  -Resumed PTA Torsemide/Lisinopril   -Continue all other current management      Right shoulder pain  Right thumb pain  -Right shoulder pain started around 2 days prior to this hospitalization.  -WBC mildly elevated.  CRP is elevated.  -Over the last 2 days, patient developed worsening right elbow pain, right thumb pain.  Swelling of the right forearm.  -Currently on vancomycin.  -Blood cultures remain negative to date.  -Working diagnosis gout attack versus septic arthritis.  Discussed with Dr. Hanson on 8/7, given his history of recent discitis, finding on right shoulder MRI, he believes that its less likely gout attack, therefore he would like to proceed with I&D of the right shoulder and the right elbow.    Plan  -Intraoperative cultures pending  -Continue colchicine 0.6 mg daily  -Continue current pain management  -Recheck CRP  -Continue vancomycin.  Appreciate infectious  disease recommendation    Recent L4-L5 discitis/osteomyelitis  Neck stiffness  -Completed 6 weeks course of IV vancomycin and ertapenem on 8/5.  -Lumbar CT on 7/26/2024 showed resolution of epidural abscess.  -8/6, patient reported neck stiffness.  No clear tenderness to palpation.      Plan  -Pending MRI of the cervical spine    Diabetes  -At home on Lantus 8 units twice daily, glipizide 2.5 mg daily, semaglutide 40 mg daily  -Fingerstick glucose around 100-200     Plan  -Decrease Lantus to 4 units twice daily given n.p.o. status.  Will most likely increase back to his home dose on 8/8.  -Continue to hold glipizide and semaglutide     Acute kidney injury on CKD  -Baseline creatinine around 1.1  -Currently on presentation 1.3  -Creatinine improved back to baseline.     Plan  -No further intervention     Gout  Possible gout attack  -Uric acid elevated around 9.  Unclear why he was not on any uric acid lowering agents.  -Patient has significant pain of his right thumb, right elbow.  Erythema and warmth of the right forearm and right thumb.  -Possible acute gout attack versus infection.     Plan  -Continue home colchicine  -Would not start any uric acid lowering agents at this point given possible acute gout attack.     Anemia of chronic inflammation with possible component of iron deficiency anemia  -Iron level low at 17 with iron sat index of 9.  Ferritin within normal limits, however, ferritin is an acute phase reactant  -Hemoglobin dropped from around 11-9.2.  No signs of active bleeding.    Plan  -Continue to monitor hemoglobin.  -Would recommend repeating iron studies as an outpatient.    HFpEF  -At home on torsemide 20 mg daily  -Appears euvolemic of his examination.     Plan  -PTA Torsemide     Hypertension  -At home on lisinopril 10 mg daily, torsemide 20 mg daily  -Blood pressures currently around 140/60.  -No indication for strict blood pressure control during this hospitalization.     Plan  -PTA  Lisinopril          Diet: Moderate Consistent Carb (60 g CHO per Meal) Diet  Snacks/Supplements Adult: Glucerna; Between Meals    DVT Prophylaxis: Enoxaparin (Lovenox) SQ  Barnes Catheter: Not present  Lines: None       Cardiac Monitoring: None  Code Status: Full Code      Clinically Significant Risk Factors              # Hypoalbuminemia: Lowest albumin = 3 g/dL at 8/4/2024  9:24 PM, will monitor as appropriate     # Hypertension: Noted on problem list    # Chronic heart failure with preserved ejection fraction: heart failure noted on problem list and last echo with EF >50%          # DMII: A1C = 8.0 % (Ref range: <5.7 %) within past 6 months, PRESENT ON ADMISSION  # Overweight: Estimated body mass index is 25.9 kg/m  as calculated from the following:    Height as of this encounter: 1.829 m (6').    Weight as of this encounter: 86.6 kg (191 lb)., PRESENT ON ADMISSION       # Financial/Environmental Concerns: none         Disposition Plan     Medically Ready for Discharge: Anticipated in 2-4 Days             Rubin Judd MD  Hospitalist Service  [facility]  Securely message with [employer] (more info)  Text page via [facility] Paging/Directory   ______________________________________________________________________    Interval History   NAEO. Feeling better. Will work with therapy today. Still with some neck stiffness/soreness.       Physical Exam   Vital Signs: Temp: 98.7  F (37.1  C) Temp src: Oral BP: (!) 153/78 Pulse: 94   Resp: 18 SpO2: 90 % O2 Device: Nasal cannula Oxygen Delivery: 2 LPM  Weight: 191 lbs 0 oz    Gen:Well appearing, well nourished, in no acute distress  HEENT: NC/AT, MMM, JOLIE, EOMI, Supple, no TTP of C-spine  CV: RRR, normal s1, normal s2, no m/r/g  Resp: CTAB, normal I/E effort, no additional respiratory sounds  Abd: +BS, non-tender, non-distended, no guarding or rebound tenderness  Ext: Sling, bandage/splint right arm/wrist, dressings C/D/I. Otherwise No significant  deformities or trauma, moving all ext freely  Skin: No erythema, no lesions or rashes.   Neuro:No focal neurologic deficit, AxOx4. Strength and sensation grossly intact  Psych: Pleasant, answering questions appropriately, normal mood/affect. Insight good, judgement intact.       Medical Decision Making       60 MINUTES SPENT BY ME on the date of service doing chart review, history, exam, documentation & further activities per the note.      Data     I have personally reviewed the following data over the past 24 hrs:    8.8  \   9.6 (L)   / 400     139 102 14.5 /  186 (H)   4.1 27 1.06 \       Imaging results reviewed over the past 24 hrs:   No results found for this or any previous visit (from the past 24 hour(s)).

## 2024-08-08 NOTE — PROGRESS NOTES
[facility] Progress Note      Post-operative Day: 1 Day Post-Op    Procedure(s):  IRRIGATION AND DEBRIDEMENT, SHOULDER  EXCISION, CLAVICLE, DISTAL  IRRIGATION AND DEBRIDEMENT, ELBOW AND THUMB      Subjective:    Pain: severe in thumb and elbow with any movement or touch. Shoulder is minimally painful    Chest pain, SOB:  No      Objective:  Blood pressure (!) 168/81, pulse 82, temperature 97.8  F (36.6  C), temperature source Oral, resp. rate 17, height 1.829 m (6'), weight 86.6 kg (191 lb), SpO2 96%.    Patient Vitals for the past 24 hrs:   BP Temp Temp src Pulse Resp SpO2   08/07/24 2330 (!) 168/81 97.8  F (36.6  C) Oral 82 17 96 %   08/07/24 2230 (!) 175/77 97.8  F (36.6  C) Oral 77 16 97 %   08/07/24 2200 (!) 158/75 97.9  F (36.6  C) Oral 80 16 98 %   08/07/24 2130 (!) 179/81 97.6  F (36.4  C) Oral 77 16 94 %   08/07/24 2100 (!) 174/80 98.1  F (36.7  C) Oral 81 15 91 %   08/07/24 2030 (!) 157/74 97.7  F (36.5  C) Oral 82 18 98 %   08/07/24 2000 (!) 172/79 -- -- 81 18 99 %   08/07/24 1950 (!) 161/77 -- -- 80 11 100 %   08/07/24 1940 (!) 158/74 -- -- 81 10 92 %   08/07/24 1930 (!) 166/81 -- -- 84 12 100 %   08/07/24 1920 (!) 161/81 -- -- 87 14 100 %   08/07/24 1910 (!) 160/77 -- -- 93 16 100 %   08/07/24 1908 (!) 160/77 98.2  F (36.8  C) Temporal 94 15 100 %   08/07/24 1600 (!) 146/71 98.9  F (37.2  C) Temporal 81 16 95 %   08/07/24 1330 -- -- -- 80 15 --   08/07/24 1315 -- -- -- 80 17 --   08/07/24 1300 -- -- -- 79 16 --   08/07/24 1245 -- -- -- 81 (!) 7 --   08/07/24 1230 -- -- -- 83 (!) 7 --   08/07/24 1217 (!) 149/72 97.8  F (36.6  C) Oral -- -- 97 %   08/07/24 1215 (!) 149/72 -- -- 86 22 97 %   08/07/24 1200 -- -- -- 81 15 --   08/07/24 1000 -- -- -- 91 18 --   08/07/24 0902 -- -- -- -- -- 93 %   08/07/24 0900 133/63 97.8  F (36.6  C) Oral 99 18 (!) 89 %   08/07/24 0803 (!) 161/77 -- -- 100 18 92 %   08/07/24 0800 (!) 161/77 -- -- -- -- --       Wt Readings from Last 4  Encounters:   08/04/24 86.6 kg (191 lb)   07/29/24 86.2 kg (190 lb)   07/25/24 87.1 kg (192 lb)   07/19/24 86.6 kg (191 lb)         Motor function, sensation, and circulation of bilateral lower extremities intact   Yes  Wound status: incisions are clean dry and intact.  Post op dressing covering incisions, no drainage noted on bandages. Elbow drain in place and patent with minimal drainage   Post op dressing intact. Yes  Calf tenderness: Bilateral  No    Pertinent Labs   Lab Results: personally reviewed.     Recent Labs   Lab Test 08/07/24  0645 08/05/24  0640 08/04/24  2124 08/01/24  0835 06/20/24  0829 06/19/24  1316 04/26/24  0852 04/26/24  0430 08/26/22  0942 08/25/22  1746 06/16/22  0934 12/25/21  2000 07/29/21  0657 07/28/21  0337 04/29/21  1631 07/28/20  0626   INR  --   --   --   --   --  1.04  --  1.11  --   --   --  1.17*  --   --   --   --    WBC 9.0  --  11.8* 6.4   < >  --    < > 11.4*   < > 12.2*   < > 18.6*   < > 8.9   < > 7.4   HGB 9.2*  --  11.1* 11.7*   < >  --    < > 11.9*   < > 10.9*   < > 12.7*   < > 12.1*   < > 10.7*   HCT 29.2*  --  35.4* 39.9*   < >  --    < > 35.5*   < > 34.0*   < > 40.0   < > 38.1*   < > 35.2*   MCV 86  --  87 94   < >  --    < > 87   < > 89   < > 91   < > 97   < > 92     --  360 325   < >  --    < > 253   < > 441   < > 379   < > 264   < > 219    139 139  --    < >  --    < > 138   < > 138   < > 136   < > 139   < > 137   CRP  --   --   --   --   --   --   --   --   --  4.7*  --   --   --  84.2*  --  48.3*    < > = values in this interval not displayed.       Plan: Anticoagulation protocol: ASA 81 mg daily              Pain medications: oxycodone, dilaudid, tylenol, and Robaxin            Weight bearing status:  NWB RUE, okay for ROM as tolerated, sling PRN             Cultures pending. Elbow crystals positive, consistent with gout. Treatment plan per hospital medicine            Disposition:  Home pending cultures and antibiotic treatment plan.               Continue cares and rehabilitation     Report completed by:  LOLITA HOFFMAN PA-C  Date: 8/8/2024  Time: 6:01 AM  Call [phone] with questions

## 2024-08-08 NOTE — PLAN OF CARE
Goal Outcome Evaluation:    Patient is alert and oriented x4. VSS with elevated bps. 1L O2 via NC. Afebrile. Complains of right knee and shoulder pain. Applied ice packs and repositioned as tolerated. No complaints of sob, chest pain, dizziness, nor n/v. Patient has not been out of bed this shift. PIV intact and saline locked. Call light within reach.    Problem: Adult Inpatient Plan of Care  Goal: Absence of Hospital-Acquired Illness or Injury  Intervention: Identify and Manage Fall Risk  Recent Flowsheet Documentation  Taken 8/8/2024 0543 by Maggy Heck RN  Safety Promotion/Fall Prevention:   safety round/check completed   supervised activity   room near nurse's station   room door open   nonskid shoes/slippers when out of bed   lighting adjusted   increase visualization of patient   increased rounding and observation   clutter free environment maintained  Taken 8/8/2024 0059 by Maggy Heck RN  Safety Promotion/Fall Prevention:   safety round/check completed   supervised activity   room near nurse's station   room door open   nonskid shoes/slippers when out of bed   lighting adjusted   increase visualization of patient   increased rounding and observation   clutter free environment maintained  Intervention: Prevent Skin Injury  Recent Flowsheet Documentation  Taken 8/8/2024 0543 by Maggy Heck RN  Body Position: supine, head elevated  Skin Protection: adhesive use limited  Device Skin Pressure Protection:   adhesive use limited   tubing/devices free from skin contact  Taken 8/8/2024 0059 by Maggy Heck RN  Body Position:   turned   left  Skin Protection: adhesive use limited  Device Skin Pressure Protection:   adhesive use limited   tubing/devices free from skin contact  Intervention: Prevent and Manage VTE (Venous Thromboembolism) Risk  Recent Flowsheet Documentation  Taken 8/8/2024 0543 by Maggy Heck RN  VTE Prevention/Management: SCDs on (sequential compression devices)  Taken 8/8/2024 0059 by Maria R  Maggy KAUFMAN RN  VTE Prevention/Management: SCDs on (sequential compression devices)  Intervention: Prevent Infection  Recent Flowsheet Documentation  Taken 8/8/2024 0543 by Maggy Heck, RN  Infection Prevention:   hand hygiene promoted   rest/sleep promoted   single patient room provided  Taken 8/8/2024 0059 by Maggy Heck, RN  Infection Prevention:   hand hygiene promoted   rest/sleep promoted   single patient room provided  Goal: Optimal Comfort and Wellbeing  Outcome: Progressing

## 2024-08-08 NOTE — PLAN OF CARE
Problem: Adult Inpatient Plan of Care  Goal: Optimal Comfort and Wellbeing  Outcome: Progressing  Intervention: Monitor Pain and Promote Comfort  Recent Flowsheet Documentation  Taken 8/7/2024 2030 by Tania Flanagan RN  Pain Management Interventions: medication (see MAR)     Problem: Pain Acute  Goal: Optimal Pain Control and Function  Intervention: Prevent or Manage Pain  Recent Flowsheet Documentation  Taken 8/7/2024 2230 by Tania Flanagan RN  Sensory Stimulation Regulation:   care clustered   television on  Medication Review/Management:   medications reviewed   high-risk medications identified  Taken 8/7/2024 2030 by Tania Flanagan RN  Sensory Stimulation Regulation:   care clustered   television on  Bowel Elimination Promotion:   adequate fluid intake promoted   ambulation promoted  Medication Review/Management:   medications reviewed   high-risk medications identified     Pt A&Ox4. On 1L NC to keeps sat above 90%. Elevated BP, remote MD notified. PRN hydralazine injection for SBP >175., but not given this shift because the BP went down. Denies pain. Saline locked. Not OOB for this shift. Right arm has cast and sling on with ACE wraps covers dressing, c/d/I. Redness blanchable on buttock. Rashes on right shoulder. Pt does have TONYA drain, 10ml output. Right hand swollen and weak squeeze. Calls appropriately. Bed alarm on for pt safety.

## 2024-08-08 NOTE — PHARMACY-VANCOMYCIN DOSING SERVICE
Pharmacy Vancomycin Note  Date of Service 2024  Patient's  1945   79 year old, male    Indication: Bone and Joint Infection  Day of Therapy: 3  Current vancomycin regimen:  1500 mg IV q24h  Current vancomycin monitoring method: AUC  Current vancomycin therapeutic monitoring goal: 400-600 mg*h/L    InsightRX Prediction of Current Vancomycin Regimen  Regimen: 1500 mg IV every 24 hours.  Start time: 22:00 on 2024  Exposure target: AUC24 (range)400-600 mg/L.hr   AUC24,ss: 351 mg/L.hr  Probability of AUC24 > 400: 20 %  Ctrough,ss: 8.2 mg/L  Probability of Ctrough,ss > 20: 0 %  Probability of nephrotoxicity (Lodise LATISHA ): 5 %      Current estimated CrCl = Estimated Creatinine Clearance: 68.1 mL/min (based on SCr of 1.06 mg/dL).    Creatinine for last 3 days  2024:  6:45 AM Creatinine 1.11 mg/dL  2024:  6:18 AM Creatinine 1.06 mg/dL    Recent Vancomycin Levels (past 3 days)  2024:  8:20 AM Vancomycin 13.0 ug/mL    Vancomycin IV Administrations (past 72 hours)                     vancomycin (VANCOCIN) 1,500 mg in 0.9% NaCl 250 mL intermittent infusion (mg) 1,500 mg New Bag 24 2200     1,500 mg New Bag 24 2252     1,500 mg New Bag 24 2142                    Nephrotoxins and other renal medications (From now, onward)      Start     Dose/Rate Route Frequency Ordered Stop    24 2200  vancomycin (VANCOCIN) 1,500 mg in 0.9% NaCl 250 mL intermittent infusion         1,500 mg  166.7 mL/hr over 90 Minutes Intravenous EVERY 24 HOURS 24 1508                 Contrast Orders - past 72 hours (72h ago, onward)      Start     Dose/Rate Route Frequency Stop    24 2200  gadobutrol (GADAVIST) injection 8.5 mL         8.5 mL Intravenous ONCE 24 2150            Interpretation of levels and current regimen:  Vancomycin level is reflective of AUC less than 400    Has serum creatinine changed greater than 50% in last 72 hours: No    Urine output:  good urine  output    Renal Function: Improving    InsightRX Prediction of Planned New Vancomycin Regimen  Regimen: 1000 mg IV every 12 hours.  Start time: 22:00 on 08/08/2024  Exposure target: AUC24 (range)400-600 mg/L.hr   AUC24,ss: 462 mg/L.hr  Probability of AUC24 > 400: 83 %  Ctrough,ss: 14 mg/L  Probability of Ctrough,ss > 20: 5 %  Probability of nephrotoxicity (Lodise LATISHA 2009): 9 %      Plan:  Increase Dose to 1000mg q12h   Vancomycin monitoring method: AUC  Vancomycin therapeutic monitoring goal: 400-600 mg*h/L  Pharmacy will check vancomycin levels as appropriate in 1-3 Days.  Serum creatinine levels will be ordered daily for the first week of therapy and at least twice weekly for subsequent weeks.    Fabian Miller, RPH

## 2024-08-08 NOTE — PROGRESS NOTES
"CLINICAL NUTRITION SERVICES - ASSESSMENT NOTE     Nutrition Prescription    RECOMMENDATIONS FOR MDs/PROVIDERS TO ORDER:  None    Malnutrition Status:    Unable to assess    Recommendations already ordered by Registered Dietitian (RD):  Glucerna BID to help meet increased nutrition needs for recovery and per pt request = 440 kcal, 20 g protein    Future/Additional Recommendations:  -Get NFPA when able  -Adjust supplement pending intake, weight, tolerance, acceptance, labs, bg       REASON FOR ASSESSMENT  Lance Ibrahim is a/an 79 year old male assessed by the dietitian for Admission Nutrition Risk Screen for positive with 34 lb + weight loss and eating poorly d/t decreased appetite    Pt presents with concern for septic arthritis in shoulder, elbow and thumb. I & D yesterday, anemia of chronic inflammation/iron deficiency  Hx recent discitis with hospitalization and TCU stays in April, May and June, TMA, CHF, gout, DM CKD    NUTRITION HISTORY  Pt came from TCU  Last seen by RD 6/19. Pt had been eating poorly at TCU x 4 weeks prior d/t dislike of the food. Takes Glucerna at baseline    Seen by RD in April - pt with poor intake x 1 week PTA d/t poor appetite.    Tends to eat well during hospital stays    Pt reports his appetite is good and he has been eating well the last month. His new apartment has an  and he is enjoying the meals. He loves Glucerna    CURRENT NUTRITION ORDERS  Diet: Moderate Consistent Carbohydrate    Intake/Tolerance: good  -Ate 100% of breakfast today at 678 kcal, 31 g protein and on 8/5.   -NPO/Clear liquid 8/7  -Intake 8/6 not documented- ordered 3 meals at 1279 kcal, 73 g protein    LABS  Labs reviewed-   A1C 8.0 6/18  BG , usually WNL    MEDICATIONS  Medications reviewed  Cochicine, ssi, lantus bid, culturelle daily, NaHCO3 tid, iv abx    ANTHROPOMETRICS  Height: 182.9 cm (6' 0\")  Most Recent Weight: 86.6 kg (191 lb)  8/5  IBW: 80.9 kg  BMI: Overweight BMI 25-29.9  Weight " History:  Wt  stable x 1 month  13.8% weight loss x 2 months Feb to April.   Wt regain April to June  12.6% weight loss x 2 weeks June to July  Wt Readings from Last 10 Encounters:   08/04/24 86.6 kg (191 lb)   07/29/24 86.2 kg (190 lb)- [facility]- low wt   07/25/24 87.1 kg (192 lb)   07/19/24 86.6 kg (191 lb)   07/18/24 86.6 kg (191 lb)   07/16/24 87 kg (191 lb 12.8 oz)   07/08/24 87.1 kg (192 lb)- office   07/02/24 88 kg (194 lb)- TCU   06/26/24 91 kg (200 lb 11.2 oz)- U   06/17/24 100.7 kg (222 lb)- hospital     05/22/24 100.9 kg (222 lb 8 oz)- hospital   05/08/24 99.3 kg (219 lb)- office   05/02/24 94.8 kg (209 lb)- U   04/30/24 93.6 kg (206 lb 6.4 oz)- hospital   04/17/24 100.7 kg (222 lb)- office   03/27/24 104.8 kg (231 lb)- office   02/27/24 108.4 kg (239 lb)- office   12/26/23 102.5 kg (226 lb)       Dosing Weight: 86.6 kg    ASSESSED NUTRITION NEEDS  Estimated Energy Needs: 9741-5271 kcals/day (25 - 30 kcals/kg)  Justification: Maintenance  Estimated Protein Needs: 104-130 grams protein/day (1.2 - 1.5 grams of pro/kg)  Justification: Hypercatabolism with acute illness and Post-op  Estimated Fluid Needs: 8516-5242 mL/day (1 mL/kcal)   Justification: Maintenance and Per provider pending fluid status    PHYSICAL FINDINGS  See malnutrition section below.  -Thumb, elbow, shoulder incision sites  -Elbow drain  -GI - Last BM 8/6  -Pt with cast and sling on right arm-he is right handed for eating. He reports this has been more challenging but he is managing  -Deferred NFPA d/t pt being on bedpan    MALNUTRITION:  % Weight Loss:  None noted- prior wt loss this year but now stable x 1 month  % Intake:  No decreased intake noted  Subcutaneous Fat Loss:  Unable to assess  Muscle Loss:  Unable to assess  Fluid Retention:  Mild +1 bilateral LE    Malnutrition Diagnosis: Unable to determine due to need for NFPA  In Context of:  Acute illness or injury    NUTRITION DIAGNOSIS  Unintended weight loss related to multiple  hospitalizations/rehab/illnesses x 5 months inadequate intake  as evidenced by recent hx of     INTERVENTIONS  Implementation  Glucerna BID to help meet increased nutrition needs for recovery and per pt request = 440 kcal, 20 g protein    Goals  Maintain weight  Meet > 75% of estimated nutrition needs     Monitoring/Evaluation  Progress toward goals will be monitored and evaluated per protocol.

## 2024-08-09 ENCOUNTER — APPOINTMENT (OUTPATIENT)
Dept: OCCUPATIONAL THERAPY | Facility: CLINIC | Age: 79
DRG: 464 | End: 2024-08-09
Payer: COMMERCIAL

## 2024-08-09 ENCOUNTER — APPOINTMENT (OUTPATIENT)
Dept: ULTRASOUND IMAGING | Facility: CLINIC | Age: 79
DRG: 464 | End: 2024-08-09
Attending: RADIOLOGY
Payer: COMMERCIAL

## 2024-08-09 ENCOUNTER — APPOINTMENT (OUTPATIENT)
Dept: RADIOLOGY | Facility: CLINIC | Age: 79
DRG: 464 | End: 2024-08-09
Attending: PHYSICIAN ASSISTANT
Payer: COMMERCIAL

## 2024-08-09 LAB
% LINING CELLS, BODY FLUID: 1 %
APPEARANCE FLD: ABNORMAL
BACTERIA BLD CULT: NO GROWTH
BACTERIA WND CULT: NO GROWTH
CELL COUNT BODY FLUID SOURCE: ABNORMAL
COLOR FLD: YELLOW
CRP SERPL-MCNC: 120 MG/L
CRYSTALS SNV MICRO: NORMAL
ERYTHROCYTE [DISTWIDTH] IN BLOOD BY AUTOMATED COUNT: 15.9 % (ref 10–15)
ERYTHROCYTE [SEDIMENTATION RATE] IN BLOOD BY WESTERGREN METHOD: 107 MM/HR (ref 0–20)
GLUCOSE BLDC GLUCOMTR-MCNC: 112 MG/DL (ref 70–99)
GLUCOSE BLDC GLUCOMTR-MCNC: 144 MG/DL (ref 70–99)
GLUCOSE BLDC GLUCOMTR-MCNC: 145 MG/DL (ref 70–99)
GLUCOSE BLDC GLUCOMTR-MCNC: 166 MG/DL (ref 70–99)
HCT VFR BLD AUTO: 28.9 % (ref 40–53)
HGB BLD-MCNC: 8.9 G/DL (ref 13.3–17.7)
HOLD SPECIMEN: NORMAL
LYMPHOCYTES NFR FLD MANUAL: 6 %
MCH RBC QN AUTO: 27.4 PG (ref 26.5–33)
MCHC RBC AUTO-ENTMCNC: 30.8 G/DL (ref 31.5–36.5)
MCV RBC AUTO: 89 FL (ref 78–100)
MONOS+MACROS NFR FLD MANUAL: 13 %
NEUTS BAND NFR FLD MANUAL: 80 %
PLATELET # BLD AUTO: 362 10E3/UL (ref 150–450)
RBC # BLD AUTO: 3.25 10E6/UL (ref 4.4–5.9)
WBC # BLD AUTO: 8.5 10E3/UL (ref 4–11)
WBC # FLD AUTO: 2851 /UL

## 2024-08-09 PROCEDURE — 87075 CULTR BACTERIA EXCEPT BLOOD: CPT | Performed by: STUDENT IN AN ORGANIZED HEALTH CARE EDUCATION/TRAINING PROGRAM

## 2024-08-09 PROCEDURE — 250N000013 HC RX MED GY IP 250 OP 250 PS 637: Performed by: EMERGENCY MEDICINE

## 2024-08-09 PROCEDURE — 250N000011 HC RX IP 250 OP 636: Performed by: EMERGENCY MEDICINE

## 2024-08-09 PROCEDURE — 89060 EXAM SYNOVIAL FLUID CRYSTALS: CPT | Performed by: PHYSICIAN ASSISTANT

## 2024-08-09 PROCEDURE — 87205 SMEAR GRAM STAIN: CPT | Performed by: STUDENT IN AN ORGANIZED HEALTH CARE EDUCATION/TRAINING PROGRAM

## 2024-08-09 PROCEDURE — 36415 COLL VENOUS BLD VENIPUNCTURE: CPT | Performed by: STUDENT IN AN ORGANIZED HEALTH CARE EDUCATION/TRAINING PROGRAM

## 2024-08-09 PROCEDURE — 85027 COMPLETE CBC AUTOMATED: CPT | Performed by: STUDENT IN AN ORGANIZED HEALTH CARE EDUCATION/TRAINING PROGRAM

## 2024-08-09 PROCEDURE — 250N000013 HC RX MED GY IP 250 OP 250 PS 637: Performed by: STUDENT IN AN ORGANIZED HEALTH CARE EDUCATION/TRAINING PROGRAM

## 2024-08-09 PROCEDURE — 99233 SBSQ HOSP IP/OBS HIGH 50: CPT | Performed by: STUDENT IN AN ORGANIZED HEALTH CARE EDUCATION/TRAINING PROGRAM

## 2024-08-09 PROCEDURE — 20611 DRAIN/INJ JOINT/BURSA W/US: CPT | Mod: RT

## 2024-08-09 PROCEDURE — 250N000011 HC RX IP 250 OP 636: Performed by: STUDENT IN AN ORGANIZED HEALTH CARE EDUCATION/TRAINING PROGRAM

## 2024-08-09 PROCEDURE — 36415 COLL VENOUS BLD VENIPUNCTURE: CPT | Performed by: PHYSICIAN ASSISTANT

## 2024-08-09 PROCEDURE — 272N000710 US JOINT INJECTION ASPIRATION MAJOR RIGHT

## 2024-08-09 PROCEDURE — 120N000001 HC R&B MED SURG/OB

## 2024-08-09 PROCEDURE — 73562 X-RAY EXAM OF KNEE 3: CPT | Mod: RT

## 2024-08-09 PROCEDURE — 89050 BODY FLUID CELL COUNT: CPT | Performed by: STUDENT IN AN ORGANIZED HEALTH CARE EDUCATION/TRAINING PROGRAM

## 2024-08-09 PROCEDURE — 97535 SELF CARE MNGMENT TRAINING: CPT | Mod: GO

## 2024-08-09 PROCEDURE — 99232 SBSQ HOSP IP/OBS MODERATE 35: CPT | Performed by: INTERNAL MEDICINE

## 2024-08-09 PROCEDURE — 85652 RBC SED RATE AUTOMATED: CPT | Performed by: PHYSICIAN ASSISTANT

## 2024-08-09 PROCEDURE — 86140 C-REACTIVE PROTEIN: CPT | Performed by: PHYSICIAN ASSISTANT

## 2024-08-09 RX ADMIN — GABAPENTIN 300 MG: 300 CAPSULE ORAL at 08:30

## 2024-08-09 RX ADMIN — VANCOMYCIN HYDROCHLORIDE 1000 MG: 1 INJECTION, SOLUTION INTRAVENOUS at 13:30

## 2024-08-09 RX ADMIN — SODIUM BICARBONATE 1950 MG: 650 TABLET ORAL at 08:29

## 2024-08-09 RX ADMIN — INSULIN GLARGINE 4 UNITS: 100 INJECTION, SOLUTION SUBCUTANEOUS at 08:30

## 2024-08-09 RX ADMIN — COLCHICINE 0.6 MG: 0.6 TABLET ORAL at 08:34

## 2024-08-09 RX ADMIN — Medication 2 CAPSULE: at 08:29

## 2024-08-09 RX ADMIN — LISINOPRIL 10 MG: 10 TABLET ORAL at 13:09

## 2024-08-09 RX ADMIN — GABAPENTIN 300 MG: 300 CAPSULE ORAL at 22:22

## 2024-08-09 RX ADMIN — ENOXAPARIN SODIUM 40 MG: 100 INJECTION SUBCUTANEOUS at 13:30

## 2024-08-09 RX ADMIN — OXYCODONE HYDROCHLORIDE 5 MG: 5 TABLET ORAL at 22:22

## 2024-08-09 RX ADMIN — INSULIN ASPART 1 UNITS: 100 INJECTION, SOLUTION INTRAVENOUS; SUBCUTANEOUS at 13:08

## 2024-08-09 RX ADMIN — FINASTERIDE 5 MG: 5 TABLET, FILM COATED ORAL at 08:30

## 2024-08-09 RX ADMIN — ASPIRIN 81 MG: 81 TABLET, COATED ORAL at 08:30

## 2024-08-09 RX ADMIN — OXYCODONE HYDROCHLORIDE 5 MG: 5 TABLET ORAL at 08:29

## 2024-08-09 RX ADMIN — INSULIN ASPART 1 UNITS: 100 INJECTION, SOLUTION INTRAVENOUS; SUBCUTANEOUS at 17:09

## 2024-08-09 RX ADMIN — VANCOMYCIN HYDROCHLORIDE 1000 MG: 1 INJECTION, SOLUTION INTRAVENOUS at 23:17

## 2024-08-09 RX ADMIN — DICLOFENAC SODIUM 4 G: 10 GEL TOPICAL at 08:30

## 2024-08-09 RX ADMIN — CARBOXYMETHYLCELLULOSE SODIUM 1 DROP: 10 GEL OPHTHALMIC at 08:30

## 2024-08-09 RX ADMIN — GABAPENTIN 300 MG: 300 CAPSULE ORAL at 15:33

## 2024-08-09 RX ADMIN — TORSEMIDE 10 MG: 5 TABLET ORAL at 08:29

## 2024-08-09 RX ADMIN — METHOCARBAMOL 500 MG: 500 TABLET ORAL at 08:29

## 2024-08-09 RX ADMIN — HYDROMORPHONE HYDROCHLORIDE 0.4 MG: 1 INJECTION, SOLUTION INTRAMUSCULAR; INTRAVENOUS; SUBCUTANEOUS at 11:39

## 2024-08-09 RX ADMIN — SODIUM BICARBONATE 1950 MG: 650 TABLET ORAL at 15:33

## 2024-08-09 RX ADMIN — LIDOCAINE 2 PATCH: 4 PATCH TOPICAL at 17:00

## 2024-08-09 RX ADMIN — SODIUM BICARBONATE 1950 MG: 650 TABLET ORAL at 22:22

## 2024-08-09 RX ADMIN — INSULIN GLARGINE 4 UNITS: 100 INJECTION, SOLUTION SUBCUTANEOUS at 22:23

## 2024-08-09 ASSESSMENT — ACTIVITIES OF DAILY LIVING (ADL)
ADLS_ACUITY_SCORE: 56
ADLS_ACUITY_SCORE: 63
ADLS_ACUITY_SCORE: 56
ADLS_ACUITY_SCORE: 63
ADLS_ACUITY_SCORE: 56

## 2024-08-09 NOTE — PROGRESS NOTES
INFECTIOUS DISEASE FOLLOW UP NOTE    Date: 08/09/2024   CHIEF COMPLAINT:   Chief Complaint   Patient presents with    Shoulder Pain    Fever        ASSESSMENT:    Comorbid conditions affecting immune system: Type II Diabetes mellitus   Active Problems:    Acute pain of right shoulder    Fever, unspecified fever cause     Right Shoulder Pain  Concern for right shoulder septic Arthritis  -acute onset  -CRP elevated on admission 142. WBC 11.8 on admission, improved  -possible gout flare; now with right thumb pain, right elbow pain/bursitis  -blood culture negative to date  -mri ordered of shoulder showing AC joint buritis/inflammation.   -seen by ortho, s/p irrigation and debridement of right shoulder, clavicle, elbow, and thumb 8/7/2024, cultures negative to date  -synovial fluid right elbow showing monosodium urate crystals    Right knee pain  -History of right TKA  -xray with small effusion  -concern for PJI, plan for u/s and aspiration today     Recent L4-L5 discitis/osteomyelitis  -s/p aspiration on 6/19 - culture negative  -treated with 6 weeks IV vancomycin and Ertapenem - completed  -Repeat lumbar CT on 7/26/2024 showed resolution of epidural abscess     Recommendations:   -Continue vancomycin  -u/s and possible knee aspiration today. Ortho to evaluate for possible infection  -hold off on steroids for now, however if cultures remain negative, would pivot toward gout treatment    Koko Wolfe MD  [facility]  Direct messaging: [employer] Paging   On-Call ID provider: [phone], option: 9     ______________________________________________________________________    SUBJECTIVE / INTERVAL HISTORY:  Says that shoulder, elbow, and even thumb are feeling better today. Still with limited range of motion. He can move his neck more today. Right knee is still very painful.     OBJECTIVE:  /66 (BP Location: Left arm)   Pulse 80   Temp 97.8  F (36.6  C) (Oral)   Resp 16   Ht 1.829 m  (6')   Wt 86.6 kg (191 lb)   SpO2 92%   BMI 25.90 kg/m       Resp: 16    GEN: No acute distress.    RESPIRATORY:  Normal breathing pattern. Clear to auscultation bilaterally  CARDIOVASCULAR:  Regular rate and rhythm. No murmur, click, gallop or rub.   ABDOMEN:  Soft, normal bowel sounds, non-tender,   EXTREMITIES: Right shoulder dressing in place, clean and dry. Right arm in sling. Right knee tender to palpation on lateral aspect of patella, small effusion noted. Exquisite tenderness to motion of right knee joint.   SKIN/HAIR/NAILS:  No rashes    Pertinent labs:  CRP Inflammation   Date Value Ref Range Status   07/28/2020 48.3 (H) 0.0 - 8.0 mg/L Final     CRP   Date Value Ref Range Status   08/25/2022 4.7 (H) 0.0 - <0.8 mg/dL Final      CBC RESULTS:   Recent Labs   Lab Test 08/08/24  0618   WBC 8.8   RBC 3.49*   HGB 9.6*   HCT 30.7*   MCV 88   MCH 27.5   MCHC 31.3*   RDW 15.7*         Last Comprehensive Metabolic Panel:  Sodium   Date Value Ref Range Status   08/08/2024 139 135 - 145 mmol/L Final   04/29/2021 139 133 - 144 mmol/L Final     Potassium   Date Value Ref Range Status   08/08/2024 4.1 3.4 - 5.3 mmol/L Final   08/27/2022 4.6 3.5 - 5.0 mmol/L Final   04/29/2021 4.7 3.4 - 5.3 mmol/L Final     Chloride   Date Value Ref Range Status   08/08/2024 102 98 - 107 mmol/L Final   08/27/2022 108 (H) 98 - 107 mmol/L Final   04/29/2021 107 94 - 109 mmol/L Final     Carbon Dioxide   Date Value Ref Range Status   04/29/2021 27 20 - 32 mmol/L Final     Carbon Dioxide (CO2)   Date Value Ref Range Status   08/08/2024 27 22 - 29 mmol/L Final   08/27/2022 22 22 - 31 mmol/L Final     Anion Gap   Date Value Ref Range Status   08/08/2024 10 7 - 15 mmol/L Final   08/27/2022 8 5 - 18 mmol/L Final   04/29/2021 5 3 - 14 mmol/L Final     Glucose   Date Value Ref Range Status   08/27/2022 131 (H) 70 - 125 mg/dL Final   04/29/2021 212 (H) 70 - 99 mg/dL Final     GLUCOSE BY METER POCT   Date Value Ref Range Status   08/09/2024  112 (H) 70 - 99 mg/dL Final     Urea Nitrogen   Date Value Ref Range Status   08/08/2024 14.5 8.0 - 23.0 mg/dL Final   08/27/2022 16 8 - 28 mg/dL Final   04/29/2021 27 7 - 30 mg/dL Final     Creatinine   Date Value Ref Range Status   08/08/2024 1.06 0.67 - 1.17 mg/dL Final   04/29/2021 1.19 0.66 - 1.25 mg/dL Final     GFR Estimate   Date Value Ref Range Status   08/08/2024 71 >60 mL/min/1.73m2 Final     Comment:     eGFR calculated using 2021 CKD-EPI equation.   04/29/2021 59 (L) >60 mL/min/[1.73_m2] Final     Comment:     Non  GFR Calc  Starting 12/18/2018, serum creatinine based estimated GFR (eGFR) will be   calculated using the Chronic Kidney Disease Epidemiology Collaboration   (CKD-EPI) equation.       GFR, ESTIMATED POCT   Date Value Ref Range Status   12/03/2021 15 (L) >60 mL/min/1.73m2 Final     Calcium   Date Value Ref Range Status   08/08/2024 9.2 8.8 - 10.4 mg/dL Final     Comment:     Reference intervals for this test were updated on 7/16/2024 to reflect our healthy population more accurately. There may be differences in the flagging of prior results with similar values performed with this method. Those prior results can be interpreted in the context of the updated reference intervals.   04/29/2021 9.3 8.5 - 10.1 mg/dL Final        MICROBIOLOGY DATA:  Personally reviewed.  7-Day Micro Results       Collected Updated Procedure Result Status      08/07/2024 1848 08/08/2024 2216 Anaerobic Bacterial Culture Routine [[id number]]   Tissue from Thumb, Right    Preliminary result Component Value   Culture No anaerobic organisms isolated after 1 day  [P]                08/07/2024 1848 08/07/2024 2236 Gram Stain [[id number]]   Tissue from Thumb, Right    Final result Component Value   GS Culture See corresponding culture for results   Gram Stain Result No organisms seen   Gram Stain Result 1+ WBC seen            08/07/2024 1848 08/09/2024 1001 Tissue Aerobic Bacterial Culture Routine  [[id number]]   Tissue from Thumb, Right    Preliminary result Component Value   Culture No growth after 1 day  [P]                08/07/2024 1835 08/08/2024 2231 Anaerobic Bacterial Culture Routine [[id number]]   Tissue from Clavicle, Right    Preliminary result Component Value   Culture No anaerobic organisms isolated after 1 day  [P]                08/07/2024 1835 08/07/2024 2242 Gram Stain [[id number]]   Tissue from Clavicle, Right    Final result Component Value   GS Culture See corresponding culture for results   Gram Stain Result No organisms seen   Gram Stain Result 3+ WBC seen   Predominantly PMNs            08/07/2024 1835 08/09/2024 1001 Tissue Aerobic Bacterial Culture Routine [[id number]]   Tissue from Clavicle, Right    Preliminary result Component Value   Culture No growth after 1 day  [P]                08/07/2024 1828 08/08/2024 2201 Anaerobic Bacterial Culture Routine [[id number]]   Wound from Elbow, Right    Preliminary result Component Value   Culture No anaerobic organisms isolated after 1 day  [P]                08/07/2024 1828 08/07/2024 2223 Gram Stain [[id number]]   Wound from Elbow, Right    Final result Component Value   GS Culture See corresponding culture for results   Gram Stain Result No organisms seen   Gram Stain Result 1+ WBC seen            08/07/2024 1828 08/09/2024 1000 Wound Aerobic Bacterial Culture Routine [[id number]]   Wound from Elbow, Right    Final result Component Value   Culture No Growth               08/07/2024 1824 08/08/2024 2216 Anaerobic Bacterial Culture Routine [[id number]]   Wound from Elbow, Right    Preliminary result Component Value   Culture No anaerobic organisms isolated after 1 day  [P]                08/07/2024 1824 08/07/2024 2225 Gram Stain [[id number]]   Wound from Elbow, Right    Final result Component Value   GS Culture See corresponding culture for results   Gram Stain Result No organisms seen   Gram Stain Result 1+ WBC seen             08/07/2024 1824 08/09/2024 1000 Wound Aerobic Bacterial Culture Routine [[id number]]   Wound from Elbow, Right    Final result Component Value   Culture No Growth               08/07/2024 1823 08/07/2024 2014 Crystal ID Synovial Fluid [[id number]]   (Abnormal)   Synovial fluid from Elbow, Right    Final result Component Value   Crystals Analysis Positive for extracellular crystals, consistent with monosodium urate crystals.            08/07/2024 1823 08/07/2024 2028 Cell count with differential fluid [[id number]]    (Abnormal)   Synovial fluid from Elbow, Right    Final result Component Value   No component results            08/07/2024 1823 08/07/2024 2025 Cell Count Body Fluid [[id number]]    (Abnormal)   Synovial fluid from Elbow, Right    Final result Component Value   Color Red   Clarity Turbid   Cell Count Fluid Source Elbow, Right   Right Olecranon Bursa Fluid #2   Total Nucleated Cells --   Specimen clotted, result unavailable.            08/07/2024 1823 08/07/2024 2028 Differential Body Fluid [[id number]]    Synovial fluid from Elbow, Right    Final result Component Value   % Neutrophils --   % Lymphocytes --   % Monocyte/Macrophages --            08/07/2024 1820 08/08/2024 2216 Anaerobic Bacterial Culture Routine [[id number]]   Tissue from Elbow, Right    Preliminary result Component Value   Culture No anaerobic organisms isolated after 1 day  [P]                08/07/2024 1820 08/07/2024 2239 Gram Stain [[id number]]   Tissue from Elbow, Right    Final result Component Value   GS Culture See corresponding culture for results   Gram Stain Result No organisms seen   Gram Stain Result 1+ WBC seen            08/07/2024 1820 08/09/2024 0958 Tissue Aerobic Bacterial Culture Routine [[id number]]   Tissue from Elbow, Right    Preliminary result Component Value   Culture No growth after 1 day  [P]                08/07/2024 1818 08/08/2024 2216 Anaerobic Bacterial Culture Routine [[id number]]    Wound from Elbow, Right    Preliminary result Component Value   Culture No anaerobic organisms isolated after 1 day  [P]                08/07/2024 1818 08/07/2024 2234 Gram Stain [[id number]]   Wound from Elbow, Right    Final result Component Value   GS Culture See corresponding culture for results   Gram Stain Result No organisms seen   Gram Stain Result 1+ WBC seen            08/07/2024 1818 08/09/2024 1000 Wound Aerobic Bacterial Culture Routine [[id number]]   Wound from Elbow, Right    Final result Component Value   Culture No Growth               08/07/2024 1815 08/07/2024 2130 Cell count with differential fluid [[id number]]    (Abnormal)   Synovial fluid from Elbow, Right    Final result Component Value   No component results            08/07/2024 1815 08/07/2024 1935 Crystal ID Synovial Fluid [[id number]]   (Abnormal)   Synovial fluid from Elbow, Right    Final result Component Value   Crystals Analysis Positive for intracellular crystals, consistent with monosodium urate crystals.            08/07/2024 1815 08/07/2024 2129 Cell Count Body Fluid [[id number]]    (Abnormal)   Synovial fluid from Elbow, Right    Final result Component Value   Color Red   Clarity Cloudy   Cell Count Fluid Source Elbow, Right   Total Nucleated Cells --   Specimen Clotted- Cell count not performed.            08/07/2024 1815 08/07/2024 2130 Differential Body Fluid [[id number]]    Synovial fluid from Elbow, Right    Final result Component Value   No component results            08/04/2024 2124 08/08/2024 2146 Blood Culture Peripheral Blood [[id number]]   Peripheral Blood    Preliminary result Component Value   Culture No growth after 4 days  [P]                         RADIOLOGY:  Personally Reviewed.  No results found for this or any previous visit (from the past 24 hour(s)).    Active Problems:    Acute pain of right shoulder    Fever, unspecified fever cause     Attestation:  I have reviewed today's Medications,  Vital Signs, and Labs. Recommendations discussed with primary and ortho service.

## 2024-08-09 NOTE — PLAN OF CARE
Goal Outcome Evaluation:      Plan of Care Reviewed With: patient    Overall Patient Progress: improvingOverall Patient Progress: improving    Outcome Evaluation: Pt alert and oriented x 4. IR US aspiration today. Pain to Right knee improved after. Cultures pending. Pt had not gotten up this shift. Encouraged to weight shift while in bed. Discharge pending clinical improvement.      Problem: Adult Inpatient Plan of Care  Goal: Absence of Hospital-Acquired Illness or Injury  Intervention: Prevent Skin Injury  Recent Flowsheet Documentation  Taken 8/9/2024 1533 by Jan Marshall RN  Body Position:   position changed independently   weight shifting  Taken 8/9/2024 1011 by Jan Marshall RN  Body Position: position changed independently  Taken 8/9/2024 0826 by Jan Marshall RN  Body Position: position changed independently     Problem: Pain Acute  Goal: Optimal Pain Control and Function  Outcome: Progressing  Intervention: Develop Pain Management Plan  Recent Flowsheet Documentation  Taken 8/9/2024 1011 by Jan Marshall RN  Pain Management Interventions:   medication (see MAR)   breathing exercises   care clustered   distraction   emotional support   pain management plan reviewed with patient/caregiver   relaxation techniques promoted   rest  Taken 8/9/2024 0826 by Jan Marshall RN  Pain Management Interventions:   medication (see MAR)   breathing exercises   care clustered   distraction   emotional support   pain management plan reviewed with patient/caregiver   relaxation techniques promoted   rest  Intervention: Prevent or Manage Pain  Recent Flowsheet Documentation  Taken 8/9/2024 1533 by Jan Marshall RN  Medication Review/Management: medications reviewed  Taken 8/9/2024 0826 by Jan Marshall RN  Medication Review/Management: medications reviewed     Jan Marshall RN, ONC

## 2024-08-09 NOTE — PLAN OF CARE
Patient vital signs are at baseline: Yes. VSS on RA.  Patient able to ambulate as they were prior to admission or with assist devices provided by therapies during their stay:  No,  Reason:  Patient refusing to get OOB during this shift   Patient MUST void prior to discharge:  Yes  Patient able to tolerate oral intake:  Yes  Pain has adequate pain control using Oral analgesics:  Yes  Does patient have an identified :  Yes  Has goal D/C date and time been discussed with patient:  Yes  TONYA drain removed and dressing changed, CDI. Patient is A/Ox4, VSS on RA. Dressing is CDI, full sensation per Pt. IV saline locked, patent. Patient rating pain 3/10 during shift, cold therapy, pain medications, rest, and repositioning used to manage pain.

## 2024-08-09 NOTE — PROGRESS NOTES
[facility]    Medicine Progress Note - Hospitalist Service    Date of Admission:  8/4/2024    Assessment & Plan   Mr. Ibrahim is an 79 years old man with past medical history significant for HFpEF, diabetes, gout recent hospitalization 5/2024 for urosepsis and bacteremia due to Klebsiella. Eventually diagnosed with L4-L5 discitis and 6/19.  Was placed on IV antibiotic with vancomycin and ertapenem. He presented to the hospital on 8/4/2024 with acute right shoulder pain which progressed to involve the right elbow, right thumb.  Working diagnosis is gout attack versus septic arthritis. MRI of the right shoulder showed signs of right acromioclavicular inflammation with subdeltoid bursitis, possible early osteomyelitis. Evaluated by orthopedic surgery. Plans for I&D of the right shoulder and right elbow on 8/7.    Changes today:  -US of knee and aspirate if possible  -Will hold off on steroids until septic arthritis can officially be ruled out, discussed with ID who is following  -MRI C-Spine pending, will need to be more mobile to get in MRI, neck pain has improved reassuringly  -CRP downtrending, on abx only  -Continue all other current management      Right shoulder pain  Right thumb pain  -Right shoulder pain started around 2 days prior to this hospitalization.  -WBC mildly elevated.  CRP is elevated.  -Over the last 2 days, patient developed worsening right elbow pain, right thumb pain.  Swelling of the right forearm.  -Currently on vancomycin.  -Blood cultures remain negative to date.  -Working diagnosis gout attack versus septic arthritis.  Discussed with Dr. Hanson on 8/7, given his history of recent discitis, finding on right shoulder MRI, he believes that its less likely gout attack, therefore he would like to proceed with I&D of the right shoulder and the right elbow.    Plan  -Intraoperative cultures pending  -Continue colchicine 0.6 mg daily  -Continue current pain  management  -Recheck CRP  -Continue vancomycin.  Appreciate infectious disease recommendation    Recent L4-L5 discitis/osteomyelitis  Neck stiffness  -Completed 6 weeks course of IV vancomycin and ertapenem on 8/5.  -Lumbar CT on 7/26/2024 showed resolution of epidural abscess.  -8/6, patient reported neck stiffness.  No clear tenderness to palpation.      Plan  -Pending MRI of the cervical spine    Diabetes  -At home on Lantus 8 units twice daily, glipizide 2.5 mg daily, semaglutide 40 mg daily  -Fingerstick glucose around 100-200     Plan  -Decrease Lantus to 4 units twice daily given n.p.o. status.  Will most likely increase back to his home dose on 8/8.  -Continue to hold glipizide and semaglutide     Acute kidney injury on CKD  -Baseline creatinine around 1.1  -Currently on presentation 1.3  -Creatinine improved back to baseline.     Plan  -No further intervention     Gout  Possible gout attack  -Uric acid elevated around 9.  Unclear why he was not on any uric acid lowering agents.  -Patient has significant pain of his right thumb, right elbow.  Erythema and warmth of the right forearm and right thumb.  -Possible acute gout attack versus infection.     Plan  -Continue home colchicine  -Would not start any uric acid lowering agents at this point given possible acute gout attack.     Anemia of chronic inflammation with possible component of iron deficiency anemia  -Iron level low at 17 with iron sat index of 9.  Ferritin within normal limits, however, ferritin is an acute phase reactant  -Hemoglobin dropped from around 11-9.2.  No signs of active bleeding.    Plan  -Continue to monitor hemoglobin.  -Would recommend repeating iron studies as an outpatient.    HFpEF  -At home on torsemide 20 mg daily  -Appears euvolemic of his examination.     Plan  -PTA Torsemide     Hypertension  -At home on lisinopril 10 mg daily, torsemide 20 mg daily  -Blood pressures currently around 140/60.  -No indication for strict blood  pressure control during this hospitalization.     Plan  -PTA Lisinopril          Diet: Moderate Consistent Carb (60 g CHO per Meal) Diet  Snacks/Supplements Adult: Glucerna; Between Meals    DVT Prophylaxis: Enoxaparin (Lovenox) SQ  Barnes Catheter: Not present  Lines: None       Cardiac Monitoring: None  Code Status: Full Code      Clinically Significant Risk Factors              # Hypoalbuminemia: Lowest albumin = 3 g/dL at 8/4/2024  9:24 PM, will monitor as appropriate     # Hypertension: Noted on problem list    # Chronic heart failure with preserved ejection fraction: heart failure noted on problem list and last echo with EF >50%          # DMII: A1C = 8.0 % (Ref range: <5.7 %) within past 6 months   # Overweight: Estimated body mass index is 25.9 kg/m  as calculated from the following:    Height as of this encounter: 1.829 m (6').    Weight as of this encounter: 86.6 kg (191 lb).        # Financial/Environmental Concerns: none         Disposition Plan     Medically Ready for Discharge: Anticipated in 2-4 Days             Rubin Judd MD  Hospitalist Service  [facility]  Securely message with [employer] (more info)  Text page via [facility] Paging/Directory   ______________________________________________________________________    Interval History   NAEO. Significant pain in knee, shoulder, pain in neck is improved but still there.      Physical Exam   Vital Signs: Temp: 97.8  F (36.6  C) Temp src: Oral BP: (!) 143/67 Pulse: 80   Resp: 16 SpO2: 95 % O2 Device: Nasal cannula Oxygen Delivery: 3 LPM  Weight: 191 lbs 0 oz    Gen:Well appearing, well nourished, in no acute distress  HEENT: NC/AT, MMM, JOLIE, EOMI, Supple, no TTP of C-spine  CV: RRR, normal s1, normal s2, no m/r/g  Resp: CTAB, normal I/E effort, no additional respiratory sounds  Abd: +BS, non-tender, non-distended, no guarding or rebound tenderness  Ext: Sling, bandage/splint right arm/wrist, dressings C/D/I. Otherwise No  significant deformities or trauma, moving all ext freely  Skin: No erythema, no lesions or rashes.   Neuro:No focal neurologic deficit, AxOx4. Strength and sensation grossly intact  Psych: Pleasant, answering questions appropriately, normal mood/affect. Insight good, judgement intact.       Medical Decision Making       60 MINUTES SPENT BY ME on the date of service doing chart review, history, exam, documentation & further activities per the note.      Data     I have personally reviewed the following data over the past 24 hrs:    8.5  \   8.9 (L)   / 362     N/A N/A N/A /  112 (H)   N/A N/A N/A \     Procal: N/A CRP: 120.00 (H) Lactic Acid: N/A         Imaging results reviewed over the past 24 hrs:   Recent Results (from the past 24 hour(s))   XR Knee Right 3 Views    Narrative    EXAM: XR KNEE RIGHT 3 VIEWS  LOCATION: [facility]  DATE: 8/9/2024    INDICATION: Pain and swelling  COMPARISON: 1/17/2024      Impression    IMPRESSION: Redemonstrated postoperative changes status post right knee arthroplasty with patellar resurfacing. No hardware complication. No acute fracture. Small joint effusion.

## 2024-08-09 NOTE — PLAN OF CARE
Problem: Pain Acute  Goal: Optimal Pain Control and Function  Intervention: Develop Pain Management Plan  Recent Flowsheet Documentation  Taken 8/8/2024 2032 by Rajani Smith RN  Pain Management Interventions:   medication (see MAR)   breathing exercises   care clustered   distraction   emotional support   pain management plan reviewed with patient/caregiver   relaxation techniques promoted   rest   Goal Outcome Evaluation:    VSS. Afebrile since getting tylenol at 1845. Severe pain with any movement. Intermittently refusing repositioning due to this. Incontinent of stool. IV dilaudid given x1 for sever pain with turning when changing the bed. Paged for patients home voltaren gel, reordered. Patient reports feeling better, able to rest well overnight. Patient says he is comfortable as long as he is not moving. IV abx infused. Ace wrap CDI. TONYA in place.

## 2024-08-09 NOTE — PLAN OF CARE
"Goal Outcome Evaluation:    Patient vital signs are at baseline: No,  Reason:  Hypertensive and fever this evening, MD notified  Patient able to ambulate as they were prior to admission or with assist devices provided by therapies during their stay:  No,  Reason:  Patient attempted to sit at bedside for dinner with assist of 2 but was unable to tolerate  Patient MUST void prior to discharge:  Yes  Patient able to tolerate oral intake:  Yes  Pain has adequate pain control using Oral analgesics:  No,  Reason:  Patient reporting mild pain at rest but severe 8/10 pain with any movement. Patient reports pain \"all over\" but especially to back, right knee, and right hand, arm and shoulder  Does patient have an identified :  Yes  Has goal D/C date and time been discussed with patient:  Yes      "

## 2024-08-09 NOTE — PROGRESS NOTES
[facility] Progress Note      Post-operative Day: 2 Day Post-Op    Procedure(s):  IRRIGATION AND DEBRIDEMENT, SHOULDER  EXCISION, CLAVICLE, DISTAL  IRRIGATION AND DEBRIDEMENT, ELBOW AND THUMB      Subjective:  Lance is sitting upon arrival. He states he has pain but mostly in his right knee. He is hesitant to move the knee and has minimal ROM. Warm on touch.  Pain: moderate in thumb and elbow with any movement or touch. Shoulder is minimally painful . Right knee severe.  Chest pain, SOB:  No      Objective:  Blood pressure 139/66, pulse 80, temperature 97.8  F (36.6  C), temperature source Oral, resp. rate 16, height 1.829 m (6'), weight 86.6 kg (191 lb), SpO2 92%.    Patient Vitals for the past 24 hrs:   BP Temp Temp src Pulse Resp SpO2   08/09/24 0806 139/66 97.8  F (36.6  C) Oral 80 16 92 %   08/09/24 0300 126/64 97.7  F (36.5  C) -- 90 16 96 %   08/1944 -- 99.9  F (37.7  C) Oral -- -- --   08/08/24 1845 -- (!) 101.5  F (38.6  C) Axillary -- -- --   08/08/24 1557 (!) 164/75 (!) 100.8  F (38.2  C) Oral 107 17 94 %       Wt Readings from Last 4 Encounters:   08/04/24 86.6 kg (191 lb)   07/29/24 86.2 kg (190 lb)   07/25/24 87.1 kg (192 lb)   07/19/24 86.6 kg (191 lb)         Motor function, sensation, and circulation of bilateral lower extremities intact   Yes  Wound status: incisions are clean dry and intact.  Post op dressing covering incisions, no drainage noted on bandages. Elbow drain in place and patent with minimal drainage   Post op dressing intact. Yes  Calf tenderness: Bilateral  No    Pertinent Labs   Lab Results: personally reviewed.     Recent Labs   Lab Test 08/07/24  0645 08/05/24  0640 08/04/24  2124 08/01/24  0835 06/20/24  0829 06/19/24  1316 04/26/24  0852 04/26/24  0430 08/26/22  0942 08/25/22  1746 06/16/22  0934 12/25/21 2000 07/29/21  0657 07/28/21  0337 04/29/21  1631 07/28/20  0626   INR  --   --   --   --   --  1.04  --  1.11  --   --   --  1.17*  --    Discharge Summary - [facility]   Baby Girl Helen Maradiaga 1 days female MRN: [id number]  Unit/Bed#: (N) Encounter: [id number]    Admission Date and Time: 2022  4:44 AM   Discharge Date: 2022  Admitting Diagnosis: Single liveborn infant, delivered vaginally [Z38 00]  Discharge Diagnosis: Term     HPI: Baby Reymundo Mosquera (Ashleigh) is a 3925 g (8 lb 10 5 oz) LGA female born to a 32 y o   [id number]  mother at Gestational Age: 38w3d  Discharge Weight:  Weight: 3760 g (8 lb 4 6 oz)   Pct Wt Change: -4 2 %  Route of delivery: Vaginal, Spontaneous  Procedures Performed: No orders of the defined types were placed in this encounter  Hospital Course: Infant doing well  Breast feeding with good latch  GBS neg  Bilirubin 5 49 at 24 hours of life which is low intermediate risk  LGA - blood sugars monitored  Rec follow up with [facility] in 1-2 days        Highlights of Hospital Stay:   Hearing screen: [facility]  Risk factors: No risk factors present  Parents informed: Yes  Initial PARUL screening results  Initial Hearing Screen Results Left Ear: Pass  Initial Hearing Screen Results Right Ear: Pass  Hearing Screen Date: 22    Hepatitis B vaccination:   Immunization History   Administered Date(s) Administered    Hep B, Adolescent or Pediatric 2022     Feedings (last 2 days)     Date/Time Feeding Type Feeding Route    22 0130 Breast milk Breast    22 0000 Breast milk Breast    22 2335 Breast milk Breast    22 2037 Breast milk Breast    22 1718 Breast milk Breast    22 1342 Breast milk Breast    22 1054 Breast milk Breast    22 0545 Breast milk Breast        SAT after 24 hours: Pulse Ox Screen: Initial  Preductal Sensor %: 96 %  Preductal Sensor Site: R Upper Extremity  Postductal Sensor % : 99 %  Postductal Sensor Site: R Lower Extremity  CCHD Negative Screen: Pass - No Further Intervention Needed    Mother's --   --   --    WBC 9.0  --  11.8* 6.4   < >  --    < > 11.4*   < > 12.2*   < > 18.6*   < > 8.9   < > 7.4   HGB 9.2*  --  11.1* 11.7*   < >  --    < > 11.9*   < > 10.9*   < > 12.7*   < > 12.1*   < > 10.7*   HCT 29.2*  --  35.4* 39.9*   < >  --    < > 35.5*   < > 34.0*   < > 40.0   < > 38.1*   < > 35.2*   MCV 86  --  87 94   < >  --    < > 87   < > 89   < > 91   < > 97   < > 92     --  360 325   < >  --    < > 253   < > 441   < > 379   < > 264   < > 219    139 139  --    < >  --    < > 138   < > 138   < > 136   < > 139   < > 137   CRP  --   --   --   --   --   --   --   --   --  4.7*  --   --   --  84.2*  --  48.3*    < > = values in this interval not displayed.       Plan: Anticoagulation protocol: ASA 81 mg daily              Pain medications: oxycodone, dilaudid, tylenol, and Robaxin            Weight bearing status:  femi AGUILA for ROM as tolerated, sling PRN             Cultures pending. Elbow crystals positive, consistent with gout. Treatment plan per hospital medicine            Disposition:  Home pending cultures and antibiotic treatment plan. Concern for septic right tka due to hx of infection in spine and recent washout. Ordering ultrasound of knee and if fluid will aspirate and sent for cultures.             Continue cares and rehabilitation     Report completed by:  Marcio Magallon PA-C  Date: 8/9/2024  Time: 9:13  AM     blood type:   Information for the patient's mother:  Annie Tejada [[id number]]     Lab Results   Component Value Date/Time    ABO Grouping A 2022 05:17 AM    Rh Factor Negative 2022 05:17 AM    Rh Type RH(D) NEGATIVE 12/10/2021 09:41 AM      Baby's blood type:   ABO Grouping   Date Value Ref Range Status   2022 A  Final     Rh Factor   Date Value Ref Range Status   2022 Negative  Final     Fanny:   Results from last 7 days   Lab Units 22  0635   CLAUDE IGG  Negative       Bilirubin:   Results from last 7 days   Lab Units 22  0453   TOTAL BILIRUBIN mg/dL 5 49      Metabolic Screen Date:  (22 0506 : Ananda Barry RN)    Delivery Information:    YOB: 2023   Time of birth: 4:44 AM   Sex: female   Gestational Age: 38w4d     ROM Date: 2022  ROM Time: 3:30 AM  Length of ROM: 1h 14m                Fluid Color: Clear          APGARS  One minute Five minutes   Totals: 8  9      Prenatal History:   Maternal Labs  Lab Results   Component Value Date/Time    Chlamydia trachomatis, DNA Probe Negative 2021 01:43 PM    N gonorrhoeae, DNA Probe Negative 2021 01:43 PM    ABO Grouping A 2022 05:17 AM    Rh Factor Negative 2022 05:17 AM    Rh Type RH(D) NEGATIVE 12/10/2021 09:41 AM    Hepatitis B Surface Ag Non-reactive 2020 09:37 PM    HEP C AB NON-REACTIVE 12/10/2021 09:41 AM    RPR NON-REACTIVE 12/10/2021 09:41 AM    RPR Non-Reactive 2020 09:37 PM    HIV AG/AB, 4th Gen NON-REACTIVE 12/10/2021 09:41 AM    Glucose, Fasting 88 2022 07:39 AM    Glucose, Fasting 89 2019 08:57 AM        Vitals:   Temperature: 98 1 °F (36 7 °C) (post bath)  Pulse: 122  Respirations: 36  Length: 20" (50 8 cm) (Filed from Delivery Summary)  Weight: 3760 g (8 lb 4 6 oz)  Pct Wt Change: -4 2 %    Physical Exam:General Appearance:  Alert, active, no distress  Head:  Normocephalic, AFOF                             Eyes: Conjunctiva clear, +RR  Ears:  Normally placed, no anomalies  Nose: nares patent                           Mouth:  Palate intact  Respiratory:  No grunting, flaring, retractions, breath sounds clear and equal  Cardiovascular:  Regular rate and rhythm  No murmur  Adequate perfusion/capillary refill  Femoral pulses present   Abdomen:   Soft, non-distended, no masses, bowel sounds present, no HSM  Genitourinary:  Normal genitalia  Spine:  No hair kumar, dimples  Musculoskeletal:  Normal hips  Skin/Hair/Nails:   Skin warm, dry, and intact, no rashes               Neurologic:   Normal tone and reflexes    Discharge instructions/Information to patient and family:   See after visit summary for information provided to patient and family  Provisions for Follow-Up Care:  See after visit summary for information related to follow-up care and any pertinent home health orders  Disposition: Home    Discharge Medications:  See after visit summary for reconciled discharge medications provided to patient and family

## 2024-08-10 ENCOUNTER — APPOINTMENT (OUTPATIENT)
Dept: PHYSICAL THERAPY | Facility: CLINIC | Age: 79
DRG: 464 | End: 2024-08-10
Attending: STUDENT IN AN ORGANIZED HEALTH CARE EDUCATION/TRAINING PROGRAM
Payer: COMMERCIAL

## 2024-08-10 ENCOUNTER — APPOINTMENT (OUTPATIENT)
Dept: MRI IMAGING | Facility: CLINIC | Age: 79
DRG: 464 | End: 2024-08-10
Attending: STUDENT IN AN ORGANIZED HEALTH CARE EDUCATION/TRAINING PROGRAM
Payer: COMMERCIAL

## 2024-08-10 LAB
CREAT SERPL-MCNC: 0.95 MG/DL (ref 0.67–1.17)
CRP SERPL-MCNC: 88 MG/L
EGFRCR SERPLBLD CKD-EPI 2021: 81 ML/MIN/1.73M2
ERYTHROCYTE [DISTWIDTH] IN BLOOD BY AUTOMATED COUNT: 15.9 % (ref 10–15)
GLUCOSE BLDC GLUCOMTR-MCNC: 118 MG/DL (ref 70–99)
GLUCOSE BLDC GLUCOMTR-MCNC: 165 MG/DL (ref 70–99)
GLUCOSE BLDC GLUCOMTR-MCNC: 169 MG/DL (ref 70–99)
GLUCOSE BLDC GLUCOMTR-MCNC: 197 MG/DL (ref 70–99)
HCT VFR BLD AUTO: 28.7 % (ref 40–53)
HGB BLD-MCNC: 8.8 G/DL (ref 13.3–17.7)
HOLD SPECIMEN: NORMAL
MCH RBC QN AUTO: 26.9 PG (ref 26.5–33)
MCHC RBC AUTO-ENTMCNC: 30.7 G/DL (ref 31.5–36.5)
MCV RBC AUTO: 88 FL (ref 78–100)
PLATELET # BLD AUTO: 389 10E3/UL (ref 150–450)
RBC # BLD AUTO: 3.27 10E6/UL (ref 4.4–5.9)
WBC # BLD AUTO: 8.5 10E3/UL (ref 4–11)

## 2024-08-10 PROCEDURE — 99232 SBSQ HOSP IP/OBS MODERATE 35: CPT | Performed by: INTERNAL MEDICINE

## 2024-08-10 PROCEDURE — 250N000013 HC RX MED GY IP 250 OP 250 PS 637: Performed by: STUDENT IN AN ORGANIZED HEALTH CARE EDUCATION/TRAINING PROGRAM

## 2024-08-10 PROCEDURE — 120N000001 HC R&B MED SURG/OB

## 2024-08-10 PROCEDURE — A9585 GADOBUTROL INJECTION: HCPCS | Performed by: STUDENT IN AN ORGANIZED HEALTH CARE EDUCATION/TRAINING PROGRAM

## 2024-08-10 PROCEDURE — 36415 COLL VENOUS BLD VENIPUNCTURE: CPT | Performed by: STUDENT IN AN ORGANIZED HEALTH CARE EDUCATION/TRAINING PROGRAM

## 2024-08-10 PROCEDURE — 250N000011 HC RX IP 250 OP 636: Performed by: STUDENT IN AN ORGANIZED HEALTH CARE EDUCATION/TRAINING PROGRAM

## 2024-08-10 PROCEDURE — 255N000002 HC RX 255 OP 636: Performed by: STUDENT IN AN ORGANIZED HEALTH CARE EDUCATION/TRAINING PROGRAM

## 2024-08-10 PROCEDURE — 99233 SBSQ HOSP IP/OBS HIGH 50: CPT | Performed by: STUDENT IN AN ORGANIZED HEALTH CARE EDUCATION/TRAINING PROGRAM

## 2024-08-10 PROCEDURE — 97162 PT EVAL MOD COMPLEX 30 MIN: CPT | Mod: GP

## 2024-08-10 PROCEDURE — 250N000013 HC RX MED GY IP 250 OP 250 PS 637: Performed by: EMERGENCY MEDICINE

## 2024-08-10 PROCEDURE — 72156 MRI NECK SPINE W/O & W/DYE: CPT

## 2024-08-10 PROCEDURE — 97530 THERAPEUTIC ACTIVITIES: CPT | Mod: GP

## 2024-08-10 PROCEDURE — 85027 COMPLETE CBC AUTOMATED: CPT | Performed by: STUDENT IN AN ORGANIZED HEALTH CARE EDUCATION/TRAINING PROGRAM

## 2024-08-10 PROCEDURE — 82565 ASSAY OF CREATININE: CPT | Performed by: STUDENT IN AN ORGANIZED HEALTH CARE EDUCATION/TRAINING PROGRAM

## 2024-08-10 PROCEDURE — 86140 C-REACTIVE PROTEIN: CPT | Performed by: PHYSICIAN ASSISTANT

## 2024-08-10 PROCEDURE — 250N000011 HC RX IP 250 OP 636: Performed by: EMERGENCY MEDICINE

## 2024-08-10 RX ORDER — GADOBUTROL 604.72 MG/ML
9 INJECTION INTRAVENOUS ONCE
Status: COMPLETED | OUTPATIENT
Start: 2024-08-10 | End: 2024-08-10

## 2024-08-10 RX ADMIN — LISINOPRIL 10 MG: 10 TABLET ORAL at 13:15

## 2024-08-10 RX ADMIN — VANCOMYCIN HYDROCHLORIDE 1000 MG: 1 INJECTION, SOLUTION INTRAVENOUS at 23:23

## 2024-08-10 RX ADMIN — TORSEMIDE 10 MG: 5 TABLET ORAL at 08:52

## 2024-08-10 RX ADMIN — GABAPENTIN 300 MG: 300 CAPSULE ORAL at 13:13

## 2024-08-10 RX ADMIN — INSULIN GLARGINE 4 UNITS: 100 INJECTION, SOLUTION SUBCUTANEOUS at 08:54

## 2024-08-10 RX ADMIN — INSULIN ASPART 1 UNITS: 100 INJECTION, SOLUTION INTRAVENOUS; SUBCUTANEOUS at 18:05

## 2024-08-10 RX ADMIN — Medication 2 CAPSULE: at 08:52

## 2024-08-10 RX ADMIN — HYDROMORPHONE HYDROCHLORIDE 0.4 MG: 1 INJECTION, SOLUTION INTRAMUSCULAR; INTRAVENOUS; SUBCUTANEOUS at 16:04

## 2024-08-10 RX ADMIN — SODIUM BICARBONATE 1950 MG: 650 TABLET ORAL at 19:43

## 2024-08-10 RX ADMIN — FINASTERIDE 5 MG: 5 TABLET, FILM COATED ORAL at 08:52

## 2024-08-10 RX ADMIN — ASPIRIN 81 MG: 81 TABLET, COATED ORAL at 08:52

## 2024-08-10 RX ADMIN — OXYCODONE HYDROCHLORIDE 5 MG: 5 TABLET ORAL at 09:03

## 2024-08-10 RX ADMIN — HYDROMORPHONE HYDROCHLORIDE 0.4 MG: 1 INJECTION, SOLUTION INTRAMUSCULAR; INTRAVENOUS; SUBCUTANEOUS at 10:38

## 2024-08-10 RX ADMIN — INSULIN GLARGINE 4 UNITS: 100 INJECTION, SOLUTION SUBCUTANEOUS at 19:44

## 2024-08-10 RX ADMIN — GABAPENTIN 300 MG: 300 CAPSULE ORAL at 08:52

## 2024-08-10 RX ADMIN — SODIUM BICARBONATE 1950 MG: 650 TABLET ORAL at 13:13

## 2024-08-10 RX ADMIN — COLCHICINE 0.6 MG: 0.6 TABLET ORAL at 08:53

## 2024-08-10 RX ADMIN — VANCOMYCIN HYDROCHLORIDE 1000 MG: 1 INJECTION, SOLUTION INTRAVENOUS at 10:31

## 2024-08-10 RX ADMIN — SODIUM BICARBONATE 1950 MG: 650 TABLET ORAL at 08:52

## 2024-08-10 RX ADMIN — GADOBUTROL 9 ML: 604.72 INJECTION INTRAVENOUS at 16:52

## 2024-08-10 RX ADMIN — GABAPENTIN 300 MG: 300 CAPSULE ORAL at 19:42

## 2024-08-10 RX ADMIN — LIDOCAINE 1 PATCH: 4 PATCH TOPICAL at 17:16

## 2024-08-10 RX ADMIN — INSULIN ASPART 1 UNITS: 100 INJECTION, SOLUTION INTRAVENOUS; SUBCUTANEOUS at 13:12

## 2024-08-10 RX ADMIN — ENOXAPARIN SODIUM 40 MG: 100 INJECTION SUBCUTANEOUS at 10:31

## 2024-08-10 ASSESSMENT — ACTIVITIES OF DAILY LIVING (ADL)
ADLS_ACUITY_SCORE: 61
ADLS_ACUITY_SCORE: 61
ADLS_ACUITY_SCORE: 55
ADLS_ACUITY_SCORE: 61
ADLS_ACUITY_SCORE: 55
ADLS_ACUITY_SCORE: 56
ADLS_ACUITY_SCORE: 61
ADLS_ACUITY_SCORE: 55
ADLS_ACUITY_SCORE: 55
ADLS_ACUITY_SCORE: 56
ADLS_ACUITY_SCORE: 56
ADLS_ACUITY_SCORE: 61
ADLS_ACUITY_SCORE: 56
ADLS_ACUITY_SCORE: 61
ADLS_ACUITY_SCORE: 55
ADLS_ACUITY_SCORE: 55
ADLS_ACUITY_SCORE: 56
ADLS_ACUITY_SCORE: 61
ADLS_ACUITY_SCORE: 56

## 2024-08-10 NOTE — PLAN OF CARE
Problem: Pain Acute  Goal: Optimal Pain Control and Function  Intervention: Prevent or Manage Pain  Recent Flowsheet Documentation  Taken 8/9/2024 2200 by Antoinette Ferreira, RN  Sensory Stimulation Regulation:   care clustered   lighting decreased   quiet environment promoted  Medication Review/Management: medications reviewed     Problem: Risk for Delirium  Goal: Improved Attention and Thought Clarity  Intervention: Maximize Cognitive Function  Recent Flowsheet Documentation  Taken 8/9/2024 2200 by Antoinette Ferreira, RN  Sensory Stimulation Regulation:   care clustered   lighting decreased   quiet environment promoted  Reorientation Measures:   calendar in view   clock in view     A/Ox4, intermittently confused but using call light appropriately. Episode of urinary incontinence, full linen change done. 2L overnight, VSS otherwise. Pain well controlled with 5mg Oxycodone. CMS intact.

## 2024-08-10 NOTE — PROGRESS NOTES
08/10/24 1127   Appointment Info   Signing Clinician's Name / Credentials (PT) VIRA Hayward, DIONTE   Living Environment   People in Home facility resident   Current Living Arrangements assisted living   Home Accessibility   (elevator)   Transportation Anticipated health plan transportation;family or friend will provide   Self-Care   Regular Exercise No   Equipment Currently Used at Home crutches;cane, straight;wheelchair, manual  (and 4ww   using walker most of the time recently)   Fall history within last six months no   Activity/Exercise/Self-Care Comment just moved into [place]; anticipates help with almost everything   General Information   Onset of Illness/Injury or Date of Surgery 08/04/24   Referring Physician carlos waddell   Patient/Family Therapy Goals Statement (PT) walk   Pertinent History of Current Problem (include personal factors and/or comorbidities that impact the POC) reports need of TKA; unable to bear wt.  Had R TKA 2018. 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 NWB R UE; in sling)   Weight-Bearing Status - RUE nonweight-bearing   Cognition   Orientation Status (Cognition) oriented x 4  (but seemed to contradict himself throughout session)   Pain Assessment   Patient Currently in Pain   (none at rest; multiple areas with movement (Low back and R LE severe))   Range of Motion (ROM)   ROM Comment L shoulder only 50 flexion (hardware in shoulder); L DF 0; minimal R hip IR; knees both limited extension approx. 10 deg.   Strength (Manual Muscle Testing)   Strength Comments poor L SLR; abd L 2; DF L 3+. Barely can heelslide on R; hip abd 2- R   Bed Mobility   Bed Mobility Limitations decreased ability to use arms for pushing/pulling;decreased ability to use legs for bridging/pushing;impaired ability to control trunk for mobility   Impairments Contributing to Impaired Bed Mobility pain;decreased strength;decreased ROM    Transfers   Impairments Contributing to Impaired Transfers pain;decreased strength   Balance   Balance Comments good static sitting once established   Sensory Examination   Sensory Perception Comments chronic numbness bilat LE's (neuropathy)   Clinical Impression   Criteria for Skilled Therapeutic Intervention Yes, treatment indicated   PT Diagnosis (PT) impaired functional mobility   Influenced by the following impairments pain multiple areas, weakness,  limited ROM, NWB R UE   Functional limitations due to impairments all mobility   Clinical Presentation (PT Evaluation Complexity) evolving   Clinical Presentation Rationale presents as medically diagnosed   Clinical Decision Making (Complexity) moderate complexity   Planned Therapy Interventions (PT) bed mobility training;patient/family education;ROM (range of motion);strengthening;transfer training   Risk & Benefits of therapy have been explained evaluation/treatment results reviewed;patient   PT Total Evaluation Time   PT Eval, Moderate Complexity Minutes (68610) 35   Physical Therapy Goals   PT Frequency Daily   PT Predicted Duration/Target Date for Goal Attainment 08/17/24   PT Goals Bed Mobility;Transfers   PT: Bed Mobility Moderate assist;Supine to/from sit  (with bed adjustments/rail as needed)   PT: Transfers Sit to/from stand;Moderate assist   Interventions   Interventions Quick Adds Therapeutic Activity   Therapeutic Activity   Therapeutic Activities: dynamic activities to improve functional performance Minutes (45163) 20   Symptoms Noted During/After Treatment Increased pain   Treatment Detail/Skilled Intervention with HOB elevated, and chair to pull on, still max assist of 2 sup>sit (assist/guard for moving R LE especially) and scoot to EOB. Prolonged sitting unsupported (easily once up). Trial of standing using bed rail on L, Unable/too painful. Assist of 3 for sit>supine.   PT Discharge Planning   PT Plan bed mob/standing   PT Discharge  Recommendation (DC Rec) Transitional Care Facility   PT Rationale for DC Rec extremely dependent all mob.   PT Brief overview of current status Assist 2-3 for bed mob. Mechanical lift for trans.   PT Equipment Needed at Discharge   (to be determined)   Total Session Time   Timed Code Treatment Minutes 20   Total Session Time (sum of timed and untimed services) 55

## 2024-08-10 NOTE — PROGRESS NOTES
INFECTIOUS DISEASE FOLLOW UP NOTE    Date: 08/10/2024   CHIEF COMPLAINT:   Chief Complaint   Patient presents with    Shoulder Pain    Fever        ASSESSMENT:    Comorbid conditions affecting immune system: Type II Diabetes mellitus   Active Problems:    Acute pain of right shoulder    Fever, unspecified fever cause     Right Shoulder Pain  Concern for right shoulder septic Arthritis  -acute onset  -CRP elevated on admission 142. WBC 11.8 on admission, improved  -possible gout flare; now with right thumb pain, right elbow pain/bursitis  -blood culture negative to date  -mri ordered of shoulder showing AC joint buritis/inflammation.   -seen by ortho, s/p irrigation and debridement of right shoulder, clavicle, elbow, and thumb 8/7/2024, cultures negative to date  -synovial fluid right elbow showing monosodium urate crystals    Right knee pain  -History of right TKA  -xray with small effusion  -concern for PJI, s/p aspiration on 8/9. Elevated wbc and PMNs, but culture negative to date. No crystals seen     Recent L4-L5 discitis/osteomyelitis  -s/p aspiration on 6/19 - culture negative  -treated with 6 weeks IV vancomycin and Ertapenem - completed  -Repeat lumbar CT on 7/26/2024 showed resolution of epidural abscess     Recommendations:   -Continue vancomycin  -follow-up on cultures from knee  -hold off on steroids for now, however if cultures remain negative, would pivot toward gout treatment - however upper ext seem to be slowly improving    Koko Wolfe MD  [facility]  Direct messaging: [employer] P[employer]   On-Call ID provider: [phone], option: 9     ______________________________________________________________________    SUBJECTIVE / INTERVAL HISTORY:  Right knee is still painful. Says that passive movement is okay, however during exam he has pain with minimal movement. Continues to have neck soreness, unchanged today.     OBJECTIVE:  BP (!) 159/74 (BP Location: Left arm)   Pulse  80   Temp 98.1  F (36.7  C) (Oral)   Resp 16   Ht 1.829 m (6')   Wt 86.6 kg (191 lb)   SpO2 95%   BMI 25.90 kg/m       Resp: 16    GEN: No acute distress.    RESPIRATORY:  Normal breathing pattern. Clear to auscultation bilaterally  CARDIOVASCULAR:  Regular rate and rhythm. No murmur, click, gallop or rub.   ABDOMEN:  Soft, normal bowel sounds, non-tender,   EXTREMITIES: more range of motion in shoulder and elbow today. Less tenderness along right thumb. Right knee without swelling or redness. Pain with passive movement  SKIN/HAIR/NAILS:  No rashes    Pertinent labs:  CRP Inflammation   Date Value Ref Range Status   07/28/2020 48.3 (H) 0.0 - 8.0 mg/L Final     CRP   Date Value Ref Range Status   08/25/2022 4.7 (H) 0.0 - <0.8 mg/dL Final      CBC RESULTS:   Recent Labs   Lab Test 08/08/24  0618   WBC 8.8   RBC 3.49*   HGB 9.6*   HCT 30.7*   MCV 88   MCH 27.5   MCHC 31.3*   RDW 15.7*         Last Comprehensive Metabolic Panel:  Sodium   Date Value Ref Range Status   08/08/2024 139 135 - 145 mmol/L Final   04/29/2021 139 133 - 144 mmol/L Final     Potassium   Date Value Ref Range Status   08/08/2024 4.1 3.4 - 5.3 mmol/L Final   08/27/2022 4.6 3.5 - 5.0 mmol/L Final   04/29/2021 4.7 3.4 - 5.3 mmol/L Final     Chloride   Date Value Ref Range Status   08/08/2024 102 98 - 107 mmol/L Final   08/27/2022 108 (H) 98 - 107 mmol/L Final   04/29/2021 107 94 - 109 mmol/L Final     Carbon Dioxide   Date Value Ref Range Status   04/29/2021 27 20 - 32 mmol/L Final     Carbon Dioxide (CO2)   Date Value Ref Range Status   08/08/2024 27 22 - 29 mmol/L Final   08/27/2022 22 22 - 31 mmol/L Final     Anion Gap   Date Value Ref Range Status   08/08/2024 10 7 - 15 mmol/L Final   08/27/2022 8 5 - 18 mmol/L Final   04/29/2021 5 3 - 14 mmol/L Final     Glucose   Date Value Ref Range Status   08/27/2022 131 (H) 70 - 125 mg/dL Final   04/29/2021 212 (H) 70 - 99 mg/dL Final     GLUCOSE BY METER POCT   Date Value Ref Range Status    08/10/2024 118 (H) 70 - 99 mg/dL Final     Urea Nitrogen   Date Value Ref Range Status   08/08/2024 14.5 8.0 - 23.0 mg/dL Final   08/27/2022 16 8 - 28 mg/dL Final   04/29/2021 27 7 - 30 mg/dL Final     Creatinine   Date Value Ref Range Status   08/08/2024 1.06 0.67 - 1.17 mg/dL Final   04/29/2021 1.19 0.66 - 1.25 mg/dL Final     GFR Estimate   Date Value Ref Range Status   08/08/2024 71 >60 mL/min/1.73m2 Final     Comment:     eGFR calculated using 2021 CKD-EPI equation.   04/29/2021 59 (L) >60 mL/min/[1.73_m2] Final     Comment:     Non  GFR Calc  Starting 12/18/2018, serum creatinine based estimated GFR (eGFR) will be   calculated using the Chronic Kidney Disease Epidemiology Collaboration   (CKD-EPI) equation.       GFR, ESTIMATED POCT   Date Value Ref Range Status   12/03/2021 15 (L) >60 mL/min/1.73m2 Final     Calcium   Date Value Ref Range Status   08/08/2024 9.2 8.8 - 10.4 mg/dL Final     Comment:     Reference intervals for this test were updated on 7/16/2024 to reflect our healthy population more accurately. There may be differences in the flagging of prior results with similar values performed with this method. Those prior results can be interpreted in the context of the updated reference intervals.   04/29/2021 9.3 8.5 - 10.1 mg/dL Final        MICROBIOLOGY DATA:  Personally reviewed.  7-Day Micro Results       Collected Updated Procedure Result Status      08/09/2024 1231 08/09/2024 1403 Cell count with differential fluid [[id number]]    (Abnormal)   Synovial fluid from Shoulder, Right    Final result Component Value   No component results            08/09/2024 1231 08/09/2024 1243 Anaerobic Bacterial Culture Routine [[id number]]   Synovial fluid from Shoulder, Right    In process Component Value   No component results               08/09/2024 1231 08/09/2024 1403 Cell Count Body Fluid [[id number]]    (Abnormal)   Synovial fluid from Shoulder, Right    Final result Component Value  Units   Color Yellow    Clarity Hazy    Cell Count Fluid Source Knee, Right    Total Nucleated Cells 2,851 /uL            08/09/2024 1231 08/09/2024 1403 Differential Body Fluid [[id number]]    Synovial fluid from Shoulder, Right    Final result Component Value Units   % Neutrophils 80 %   % Lymphocytes 6 %   % Monocyte/Macrophages 13 %   % Lining Cells 1 %            08/09/2024 1231 08/09/2024 1417 Crystal ID Synovial Fluid [[id number]]   Synovial fluid from Shoulder, Right    Final result Component Value   Crystals Analysis No clinically significant crystals seen.            08/09/2024 1230 08/09/2024 1842 Synovial fluid Aerobic Bacterial Culture Routine With Gram Stain [[id number]]   Synovial fluid from Shoulder, Right    Preliminary result Component Value   Gram Stain Result No organisms seen  [P]     3+ WBC seen  [P]     Predominantly PMNs               08/07/2024 1848 08/09/2024 2216 Anaerobic Bacterial Culture Routine [[id number]]   Tissue from Thumb, Right    Preliminary result Component Value   Culture No anaerobic organisms isolated after 2 days  [P]                08/07/2024 1848 08/07/2024 2236 Gram Stain [[id number]]   Tissue from Thumb, Right    Final result Component Value   GS Culture See corresponding culture for results   Gram Stain Result No organisms seen   Gram Stain Result 1+ WBC seen            08/07/2024 1848 08/10/2024 0714 Tissue Aerobic Bacterial Culture Routine [[id number]]   Tissue from Thumb, Right    Preliminary result Component Value   Culture No growth after 2 days  [P]                08/07/2024 1835 08/09/2024 2231 Anaerobic Bacterial Culture Routine [[id number]]   Tissue from Clavicle, Right    Preliminary result Component Value   Culture No anaerobic organisms isolated after 2 days  [P]                08/07/2024 1835 08/07/2024 2242 Gram Stain [[id number]]   Tissue from Clavicle, Right    Final result Component Value   GS Culture See corresponding culture for  results   Gram Stain Result No organisms seen   Gram Stain Result 3+ WBC seen   Predominantly PMNs            08/07/2024 1835 08/10/2024 0714 Tissue Aerobic Bacterial Culture Routine [[id number]]   Tissue from Clavicle, Right    Preliminary result Component Value   Culture No growth after 2 days  [P]                08/07/2024 1828 08/09/2024 2201 Anaerobic Bacterial Culture Routine [[id number]]   Wound from Elbow, Right    Preliminary result Component Value   Culture No anaerobic organisms isolated after 2 days  [P]                08/07/2024 1828 08/07/2024 2223 Gram Stain [[id number]]   Wound from Elbow, Right    Final result Component Value   GS Culture See corresponding culture for results   Gram Stain Result No organisms seen   Gram Stain Result 1+ WBC seen            08/07/2024 1828 08/09/2024 1000 Wound Aerobic Bacterial Culture Routine [[id number]]   Wound from Elbow, Right    Final result Component Value   Culture No Growth               08/07/2024 1824 08/09/2024 2216 Anaerobic Bacterial Culture Routine [[id number]]   Wound from Elbow, Right    Preliminary result Component Value   Culture No anaerobic organisms isolated after 2 days  [P]                08/07/2024 1824 08/07/2024 2225 Gram Stain [[id number]]   Wound from Elbow, Right    Final result Component Value   GS Culture See corresponding culture for results   Gram Stain Result No organisms seen   Gram Stain Result 1+ WBC seen            08/07/2024 1824 08/09/2024 1000 Wound Aerobic Bacterial Culture Routine [[id number]]   Wound from Elbow, Right    Final result Component Value   Culture No Growth               08/07/2024 1823 08/07/2024 2014 Crystal ID Synovial Fluid [[id number]]   (Abnormal)   Synovial fluid from Elbow, Right    Final result Component Value   Crystals Analysis Positive for extracellular crystals, consistent with monosodium urate crystals.            08/07/2024 1823 08/07/2024 2028 Cell count with differential fluid  [[id number]]    (Abnormal)   Synovial fluid from Elbow, Right    Final result Component Value   No component results            08/07/2024 1823 08/07/2024 2025 Cell Count Body Fluid [[id number]]    (Abnormal)   Synovial fluid from Elbow, Right    Final result Component Value   Color Red   Clarity Turbid   Cell Count Fluid Source Elbow, Right   Right Olecranon Bursa Fluid #2   Total Nucleated Cells --   Specimen clotted, result unavailable.            08/07/2024 1823 08/07/2024 2028 Differential Body Fluid [[id number]]    Synovial fluid from Elbow, Right    Final result Component Value   % Neutrophils --   % Lymphocytes --   % Monocyte/Macrophages --            08/07/2024 1820 08/09/2024 2216 Anaerobic Bacterial Culture Routine [[id number]]   Tissue from Elbow, Right    Preliminary result Component Value   Culture No anaerobic organisms isolated after 2 days  [P]                08/07/2024 1820 08/07/2024 2239 Gram Stain [[id number]]   Tissue from Elbow, Right    Final result Component Value   GS Culture See corresponding culture for results   Gram Stain Result No organisms seen   Gram Stain Result 1+ WBC seen            08/07/2024 1820 08/10/2024 0714 Tissue Aerobic Bacterial Culture Routine [[id number]]   Tissue from Elbow, Right    Preliminary result Component Value   Culture No growth after 2 days  [P]                08/07/2024 1818 08/09/2024 2216 Anaerobic Bacterial Culture Routine [[id number]]   Wound from Elbow, Right    Preliminary result Component Value   Culture No anaerobic organisms isolated after 2 days  [P]                08/07/2024 1818 08/07/2024 2234 Gram Stain [[id number]]   Wound from Elbow, Right    Final result Component Value   GS Culture See corresponding culture for results   Gram Stain Result No organisms seen   Gram Stain Result 1+ WBC seen            08/07/2024 1818 08/09/2024 1000 Wound Aerobic Bacterial Culture Routine [[id number]]   Wound from Elbow, Right    Final  result Component Value   Culture No Growth               08/07/2024 1815 08/07/2024 2130 Cell count with differential fluid [[id number]]    (Abnormal)   Synovial fluid from Elbow, Right    Final result Component Value   No component results            08/07/2024 1815 08/07/2024 1935 Crystal ID Synovial Fluid [[id number]]   (Abnormal)   Synovial fluid from Elbow, Right    Final result Component Value   Crystals Analysis Positive for intracellular crystals, consistent with monosodium urate crystals.            08/07/2024 1815 08/07/2024 2129 Cell Count Body Fluid [[id number]]    (Abnormal)   Synovial fluid from Elbow, Right    Final result Component Value   Color Red   Clarity Cloudy   Cell Count Fluid Source Elbow, Right   Total Nucleated Cells --   Specimen Clotted- Cell count not performed.            08/07/2024 1815 08/07/2024 2130 Differential Body Fluid [[id number]]    Synovial fluid from Elbow, Right    Final result Component Value   No component results            08/04/2024 2124 08/09/2024 2146 Blood Culture Peripheral Blood [[id number]]   Peripheral Blood    Final result Component Value   Culture No Growth                        RADIOLOGY:  Personally Reviewed.  No results found for this or any previous visit (from the past 24 hour(s)).    Active Problems:    Acute pain of right shoulder    Fever, unspecified fever cause     Attestation:  I have reviewed today's Medications, Vital Signs, and Labs.

## 2024-08-10 NOTE — PROGRESS NOTES
[facility] Orthopaedics Progress Note      Post-operative Day: 3 Day Post-Op    Procedure(s):  IRRIGATION AND DEBRIDEMENT, SHOULDER  EXCISION, CLAVICLE, DISTAL  IRRIGATION AND DEBRIDEMENT, ELBOW AND THUMB      Subjective:  Lance is sleeping upon arrival. He states his arm pain is better. He states his knee is still painful but better than yesterday.     Pain: moderate in thumb and elbow with any movement or touch. Shoulder is minimally painful . Right knee mild at rest however moderate to severe with ROM.  Chest pain, SOB:  No      Objective:  Blood pressure (!) 159/74, pulse 80, temperature 98.1  F (36.7  C), temperature source Oral, resp. rate 16, height 1.829 m (6'), weight 86.6 kg (191 lb), SpO2 95%.    Patient Vitals for the past 24 hrs:   BP Temp Temp src Pulse Resp SpO2   08/10/24 0746 (!) 159/74 98.1  F (36.7  C) Oral 80 16 95 %   08/10/24 0035 139/67 99.8  F (37.7  C) Oral 67 16 (!) 86 %   08/09/24 1700 -- -- -- -- -- 95 %   08/09/24 1637 (!) 157/74 98  F (36.7  C) Oral 83 16 93 %   08/09/24 1301 (!) 143/67 -- -- -- -- 95 %   08/09/24 1139 -- -- -- -- -- 91 %       Wt Readings from Last 4 Encounters:   08/04/24 86.6 kg (191 lb)   07/29/24 86.2 kg (190 lb)   07/25/24 87.1 kg (192 lb)   07/19/24 86.6 kg (191 lb)         Motor function, sensation, and circulation of bilateral lower extremities intact   Yes  Wound status: incisions are clean dry and intact. Post op dressing intact. Knee aspiration site clean and dry. Band-Aid on no erythema or drainage.   Calf tenderness: Bilateral  No    Pertinent Labs   Lab Results: personally reviewed.     Recent Labs   Lab Test 08/07/24  0645 08/05/24  0640 08/04/24  2124 08/01/24  0835 06/20/24  0829 06/19/24  1316 04/26/24  0852 04/26/24  0430 08/26/22  0942 08/25/22  1746 06/16/22  0934 12/25/21 2000 07/29/21  0657 07/28/21  0337 04/29/21  1631 07/28/20  0626   INR  --   --   --   --   --  1.04  --  1.11  --   --   --  1.17*  --   --   --   --    WBC  9.0  --  11.8* 6.4   < >  --    < > 11.4*   < > 12.2*   < > 18.6*   < > 8.9   < > 7.4   HGB 9.2*  --  11.1* 11.7*   < >  --    < > 11.9*   < > 10.9*   < > 12.7*   < > 12.1*   < > 10.7*   HCT 29.2*  --  35.4* 39.9*   < >  --    < > 35.5*   < > 34.0*   < > 40.0   < > 38.1*   < > 35.2*   MCV 86  --  87 94   < >  --    < > 87   < > 89   < > 91   < > 97   < > 92     --  360 325   < >  --    < > 253   < > 441   < > 379   < > 264   < > 219    139 139  --    < >  --    < > 138   < > 138   < > 136   < > 139   < > 137   CRP  --   --   --   --   --   --   --   --   --  4.7*  --   --   --  84.2*  --  48.3*    < > = values in this interval not displayed.       Plan: Anticoagulation protocol: ASA 81 mg daily  x42 days            Pain medications: oxycodone, dilaudid, tylenol, and Robaxin            Weight bearing status:  femi AGUILA for ROM as tolerated, sling PRN             Cultures pending. Elbow crystals positive, consistent with gout. Treatment plan per hospital medicine            Disposition: Awaiting cultures of right knee. No crystals seen therefore unlikely gout. Concern for septic joint still ddx however less high on ddx due to appearance. Could be unresolved neuro pathlogy from recent lumbar abscess as it is throughout entire lower exterimity and not just joint. Discussed with hospitialist concern for cervical infection from recent lumbar abscess so pushing for cervical mri. Still working on logistics of getting that accomplished. Will wait to see results for further plan.            Continue cares and rehabilitation     Report completed by:  Marcio Magallon PA-C  Date: 8/9/2024  Time: 9:13  AM

## 2024-08-10 NOTE — PROGRESS NOTES
[facility]    Medicine Progress Note - Hospitalist Service    Date of Admission:  8/4/2024    Assessment & Plan   Mr. Ibrahim is an 79 years old man with past medical history significant for HFpEF, diabetes, gout recent hospitalization 5/2024 for urosepsis and bacteremia due to Klebsiella. Eventually diagnosed with L4-L5 discitis and 6/19.  Was placed on IV antibiotic with vancomycin and ertapenem. He presented to the hospital on 8/4/2024 with acute right shoulder pain which progressed to involve the right elbow, right thumb.  Working diagnosis is gout attack versus septic arthritis. MRI of the right shoulder showed signs of right acromioclavicular inflammation with subdeltoid bursitis, possible early osteomyelitis. Evaluated by orthopedic surgery. Plans for I&D of the right shoulder and right elbow on 8/7.    Changes today:  -Will attempt MRI C-Spine today  -No crystals seen in knee  -Cultures NGTD, ID following, continue abx  -CRP continues to downtrend on Abx  -Continue all other current management      Right shoulder pain  Right thumb pain  -Right shoulder pain started around 2 days prior to this hospitalization.  -WBC mildly elevated.  CRP is elevated.  -Over the last 2 days, patient developed worsening right elbow pain, right thumb pain.  Swelling of the right forearm.  -Currently on vancomycin.  -Blood cultures remain negative to date.  -Working diagnosis gout attack versus septic arthritis.  Discussed with Dr. Hanson on 8/7, given his history of recent discitis, finding on right shoulder MRI, he believes that its less likely gout attack, therefore he would like to proceed with I&D of the right shoulder and the right elbow.    Plan  -Intraoperative cultures pending  -Continue colchicine 0.6 mg daily  -Continue current pain management  -Recheck CRP  -Continue vancomycin.  Appreciate infectious disease recommendation    Recent L4-L5 discitis/osteomyelitis  Neck stiffness  -Completed  6 weeks course of IV vancomycin and ertapenem on 8/5.  -Lumbar CT on 7/26/2024 showed resolution of epidural abscess.  -8/6, patient reported neck stiffness.  No clear tenderness to palpation.      Plan  -Pending MRI of the cervical spine    Diabetes  -At home on Lantus 8 units twice daily, glipizide 2.5 mg daily, semaglutide 40 mg daily  -Fingerstick glucose around 100-200     Plan  -Decrease Lantus to 4 units twice daily given n.p.o. status.  Will most likely increase back to his home dose on 8/8.  -Continue to hold glipizide and semaglutide     Acute kidney injury on CKD  -Baseline creatinine around 1.1  -Currently on presentation 1.3  -Creatinine improved back to baseline.     Plan  -No further intervention     Gout  Possible gout attack  -Uric acid elevated around 9.  Unclear why he was not on any uric acid lowering agents.  -Patient has significant pain of his right thumb, right elbow.  Erythema and warmth of the right forearm and right thumb.  -Possible acute gout attack versus infection.     Plan  -Continue home colchicine  -Would not start any uric acid lowering agents at this point given possible acute gout attack.     Anemia of chronic inflammation with possible component of iron deficiency anemia  -Iron level low at 17 with iron sat index of 9.  Ferritin within normal limits, however, ferritin is an acute phase reactant  -Hemoglobin dropped from around 11-9.2.  No signs of active bleeding.    Plan  -Continue to monitor hemoglobin.  -Would recommend repeating iron studies as an outpatient.    HFpEF  -At home on torsemide 20 mg daily  -Appears euvolemic of his examination.     Plan  -PTA Torsemide     Hypertension  -At home on lisinopril 10 mg daily, torsemide 20 mg daily  -Blood pressures currently around 140/60.  -No indication for strict blood pressure control during this hospitalization.     Plan  -PTA Lisinopril          Diet: Snacks/Supplements Adult: Glucerna; Between Meals  Regular Diet Adult     DVT Prophylaxis: Enoxaparin (Lovenox) SQ  Barnes Catheter: Not present  Lines: None       Cardiac Monitoring: None  Code Status: Full Code      Clinically Significant Risk Factors              # Hypoalbuminemia: Lowest albumin = 3 g/dL at 8/4/2024  9:24 PM, will monitor as appropriate     # Hypertension: Noted on problem list    # Chronic heart failure with preserved ejection fraction: heart failure noted on problem list and last echo with EF >50%          # DMII: A1C = 8.0 % (Ref range: <5.7 %) within past 6 months   # Overweight: Estimated body mass index is 25.9 kg/m  as calculated from the following:    Height as of this encounter: 1.829 m (6').    Weight as of this encounter: 86.6 kg (191 lb).        # Financial/Environmental Concerns: none         Disposition Plan     Medically Ready for Discharge: Anticipated in 2-4 Days             Rubin Judd MD  Hospitalist Service  [facility]  Securely message with [employer] (more info)  Text page via [employer] Paging/Directory   ______________________________________________________________________    Interval History   NAEO. Feeling about same as yesterday. Will attempt to get MRI of C-Spine today. Requested diet be switched to regular      Physical Exam   Vital Signs: Temp: 98.1  F (36.7  C) Temp src: Oral BP: (!) 159/74 Pulse: 80   Resp: 16 SpO2: 95 % O2 Device: Nasal cannula Oxygen Delivery: 2 LPM  Weight: 191 lbs 0 oz    Gen:Well appearing, well nourished, in no acute distress  HEENT: NC/AT, MMM, JOLIE, EOMI, Supple, no TTP of C-spine  CV: RRR, normal s1, normal s2, no m/r/g  Resp: CTAB, normal I/E effort, no additional respiratory sounds  Abd: +BS, non-tender, non-distended, no guarding or rebound tenderness  Ext: Sling, bandage/splint right arm/wrist, dressings C/D/I. Otherwise No significant deformities or trauma, moving all ext freely  Skin: No erythema, no lesions or rashes.   Neuro:No focal neurologic deficit, AxOx4. Strength and  sensation grossly intact  Psych: Pleasant, answering questions appropriately, normal mood/affect. Insight good, judgement intact.       Medical Decision Making       60 MINUTES SPENT BY ME on the date of service doing chart review, history, exam, documentation & further activities per the note.      Data     I have personally reviewed the following data over the past 24 hrs:    8.5  \   8.8 (L)   / 389     N/A N/A N/A /  118 (H)   N/A N/A N/A \     Procal: N/A CRP: 88.00 (H) Lactic Acid: N/A         Imaging results reviewed over the past 24 hrs:   Recent Results (from the past 24 hour(s))   US Joint Injection Aspiration Major Right    Narrative    EXAMS:  1. RIGHT KNEE JOINT ASPIRATION  2. ULTRASOUND GUIDANCE    LOCATION: [facility]  DATE: 8/9/2024    INDICATION: Right knee pain. Right knee prosthetic. Joint effusion.    PROCEDURE: Procedure and risks explained and consent received. The skin was prepped and draped in sterile fashion. 5 mL 1% lidocaine infused in local soft tissues.    Under direct ultrasound guidance, a 22 gauge needle was introduced into the joint space.    COMPLICATIONS: None.    SPECIMEN: 30 mL of yellowish-brown fluid aspirated. Fluid sent to lab.      Impression    IMPRESSION:  1. Ultrasound-guided right knee joint aspiration.

## 2024-08-11 ENCOUNTER — APPOINTMENT (OUTPATIENT)
Dept: OCCUPATIONAL THERAPY | Facility: CLINIC | Age: 79
DRG: 464 | End: 2024-08-11
Payer: COMMERCIAL

## 2024-08-11 LAB
GLUCOSE BLDC GLUCOMTR-MCNC: 123 MG/DL (ref 70–99)
GLUCOSE BLDC GLUCOMTR-MCNC: 158 MG/DL (ref 70–99)
GLUCOSE BLDC GLUCOMTR-MCNC: 206 MG/DL (ref 70–99)
GLUCOSE BLDC GLUCOMTR-MCNC: 249 MG/DL (ref 70–99)
HOLD SPECIMEN: NORMAL
HOLD SPECIMEN: NORMAL
VANCOMYCIN SERPL-MCNC: 23.7 UG/ML

## 2024-08-11 PROCEDURE — 250N000013 HC RX MED GY IP 250 OP 250 PS 637: Performed by: STUDENT IN AN ORGANIZED HEALTH CARE EDUCATION/TRAINING PROGRAM

## 2024-08-11 PROCEDURE — 80202 ASSAY OF VANCOMYCIN: CPT | Performed by: STUDENT IN AN ORGANIZED HEALTH CARE EDUCATION/TRAINING PROGRAM

## 2024-08-11 PROCEDURE — 250N000013 HC RX MED GY IP 250 OP 250 PS 637: Performed by: EMERGENCY MEDICINE

## 2024-08-11 PROCEDURE — 250N000011 HC RX IP 250 OP 636: Performed by: STUDENT IN AN ORGANIZED HEALTH CARE EDUCATION/TRAINING PROGRAM

## 2024-08-11 PROCEDURE — 97535 SELF CARE MNGMENT TRAINING: CPT | Mod: GO

## 2024-08-11 PROCEDURE — 99232 SBSQ HOSP IP/OBS MODERATE 35: CPT | Performed by: INTERNAL MEDICINE

## 2024-08-11 PROCEDURE — 258N000003 HC RX IP 258 OP 636: Performed by: STUDENT IN AN ORGANIZED HEALTH CARE EDUCATION/TRAINING PROGRAM

## 2024-08-11 PROCEDURE — 120N000001 HC R&B MED SURG/OB

## 2024-08-11 PROCEDURE — 99233 SBSQ HOSP IP/OBS HIGH 50: CPT | Performed by: STUDENT IN AN ORGANIZED HEALTH CARE EDUCATION/TRAINING PROGRAM

## 2024-08-11 PROCEDURE — 36415 COLL VENOUS BLD VENIPUNCTURE: CPT | Performed by: STUDENT IN AN ORGANIZED HEALTH CARE EDUCATION/TRAINING PROGRAM

## 2024-08-11 RX ORDER — KETOROLAC TROMETHAMINE 15 MG/ML
15 INJECTION, SOLUTION INTRAMUSCULAR; INTRAVENOUS EVERY 6 HOURS PRN
Status: DISPENSED | OUTPATIENT
Start: 2024-08-11 | End: 2024-08-16

## 2024-08-11 RX ORDER — COLCHICINE 0.6 MG/1
0.6 TABLET ORAL 2 TIMES DAILY
Status: DISCONTINUED | OUTPATIENT
Start: 2024-08-11 | End: 2024-08-16 | Stop reason: HOSPADM

## 2024-08-11 RX ORDER — VANCOMYCIN HYDROCHLORIDE
1250
Status: DISCONTINUED | OUTPATIENT
Start: 2024-08-11 | End: 2024-08-12

## 2024-08-11 RX ADMIN — SODIUM BICARBONATE 1950 MG: 650 TABLET ORAL at 07:53

## 2024-08-11 RX ADMIN — TORSEMIDE 10 MG: 5 TABLET ORAL at 08:00

## 2024-08-11 RX ADMIN — SODIUM BICARBONATE 1950 MG: 650 TABLET ORAL at 15:41

## 2024-08-11 RX ADMIN — INSULIN GLARGINE 4 UNITS: 100 INJECTION, SOLUTION SUBCUTANEOUS at 19:56

## 2024-08-11 RX ADMIN — ENOXAPARIN SODIUM 40 MG: 100 INJECTION SUBCUTANEOUS at 11:20

## 2024-08-11 RX ADMIN — GABAPENTIN 300 MG: 300 CAPSULE ORAL at 07:54

## 2024-08-11 RX ADMIN — LISINOPRIL 10 MG: 10 TABLET ORAL at 15:41

## 2024-08-11 RX ADMIN — OXYCODONE HYDROCHLORIDE 5 MG: 5 TABLET ORAL at 18:35

## 2024-08-11 RX ADMIN — COLCHICINE 0.6 MG: 0.6 TABLET ORAL at 19:56

## 2024-08-11 RX ADMIN — GABAPENTIN 300 MG: 300 CAPSULE ORAL at 19:56

## 2024-08-11 RX ADMIN — INSULIN GLARGINE 4 UNITS: 100 INJECTION, SOLUTION SUBCUTANEOUS at 07:54

## 2024-08-11 RX ADMIN — OXYCODONE HYDROCHLORIDE 5 MG: 5 TABLET ORAL at 09:43

## 2024-08-11 RX ADMIN — INSULIN ASPART 3 UNITS: 100 INJECTION, SOLUTION INTRAVENOUS; SUBCUTANEOUS at 16:56

## 2024-08-11 RX ADMIN — ASPIRIN 81 MG: 81 TABLET, COATED ORAL at 07:54

## 2024-08-11 RX ADMIN — SODIUM BICARBONATE 1950 MG: 650 TABLET ORAL at 19:56

## 2024-08-11 RX ADMIN — COLCHICINE 0.6 MG: 0.6 TABLET ORAL at 07:54

## 2024-08-11 RX ADMIN — GABAPENTIN 300 MG: 300 CAPSULE ORAL at 15:41

## 2024-08-11 RX ADMIN — LIDOCAINE 1 PATCH: 4 PATCH TOPICAL at 18:27

## 2024-08-11 RX ADMIN — INSULIN ASPART 1 UNITS: 100 INJECTION, SOLUTION INTRAVENOUS; SUBCUTANEOUS at 11:21

## 2024-08-11 RX ADMIN — FINASTERIDE 5 MG: 5 TABLET, FILM COATED ORAL at 07:54

## 2024-08-11 RX ADMIN — Medication 2 CAPSULE: at 07:54

## 2024-08-11 RX ADMIN — VANCOMYCIN HYDROCHLORIDE 1250 MG: 5 INJECTION, POWDER, LYOPHILIZED, FOR SOLUTION INTRAVENOUS at 11:27

## 2024-08-11 RX ADMIN — KETOROLAC TROMETHAMINE 15 MG: 15 INJECTION, SOLUTION INTRAMUSCULAR; INTRAVENOUS at 15:52

## 2024-08-11 ASSESSMENT — ACTIVITIES OF DAILY LIVING (ADL)
ADLS_ACUITY_SCORE: 63
ADLS_ACUITY_SCORE: 61
ADLS_ACUITY_SCORE: 63
ADLS_ACUITY_SCORE: 61
ADLS_ACUITY_SCORE: 63
ADLS_ACUITY_SCORE: 61
ADLS_ACUITY_SCORE: 63
ADLS_ACUITY_SCORE: 63
ADLS_ACUITY_SCORE: 61
ADLS_ACUITY_SCORE: 63
ADLS_ACUITY_SCORE: 63
ADLS_ACUITY_SCORE: 61
ADLS_ACUITY_SCORE: 63
ADLS_ACUITY_SCORE: 63
ADLS_ACUITY_SCORE: 61
ADLS_ACUITY_SCORE: 63
ADLS_ACUITY_SCORE: 61

## 2024-08-11 NOTE — PROGRESS NOTES
[facility]    Medicine Progress Note - Hospitalist Service    Date of Admission:  8/4/2024    Assessment & Plan   Updated summary:   Mr. Ibrahim is an 79 years old man with past medical history significant for HFpEF, diabetes, gout recent hospitalization 5/2024 for urosepsis and bacteremia due to Klebsiella. Eventually diagnosed with L4-L5 discitis and 6/19.  Was placed on IV antibiotic with vancomycin and ertapenem. He presented to the hospital on 8/4/2024 with acute right shoulder pain which progressed to involve the right elbow, right thumb.  Working diagnosis is gout attack versus septic arthritis. MRI of the right shoulder showed signs of right acromioclavicular inflammation with subdeltoid bursitis, possible early osteomyelitis. Evaluated by orthopedic surgery. Plans for I&D of the right shoulder and right elbow on 8/7.    Status post Irrigation and debridement of the right AC joint, right olecranon bursa, right elbow joint, right thumb MCP joint, Open distal clavicle excision, right, on 8/7/24, with EBL of 50ccs by Dr. Hanson. Also status post ultrasound with aspiration of right knee.  Currently treating both potential septic joint and gout with, respectively, IV antibiotics and colchicine daily.  MRI C-spine 8/11 without any no acute findings, though spondylosis and stenosis are noted.  New provider on 8/11, patient feels about the same and no new symptoms or concerns.  Following cultures.  Anticoagulation plan is aspirin twice daily for 42 days.  Position is eventually for TCU.  Discussed with/updated social work. Pain spikes and adding on two prn doses of toradol. Continue lidocaine cream. Also increase colchicine to BID.    ----------    Right shoulder pain  Right thumb pain  -Right shoulder pain started around 2 days prior to this hospitalization.  -WBC mildly elevated.  CRP is elevated.  -Over the last 2 days, patient developed worsening right elbow pain, right thumb pain.   Swelling of the right forearm.  -Currently on vancomycin.  -Blood cultures remain negative to date.  -Working diagnosis gout attack versus septic arthritis.  Discussed with Dr. Hanson on 8/7, given his history of recent discitis, finding on right shoulder MRI, he believes that its less likely gout attack, therefore he would like to proceed with I&D of the right shoulder and the right elbow.    Plan  -Intraoperative cultures pending  -Continue colchicine 0.6 mg daily   - on 8/11/2024, Also increase colchicine to BID.  -Continue current pain management  -Recheck CRP  -Continue vancomycin.  Appreciate infectious disease recommendation   - follow the in-process cultures, follow speciation of organism(s) and subsequently their sensitivities and resistance (S&R) and narrow antibiotics as appropriately    - low dose toradol 15mg prn q6 hrs up to 2 doses for now  - consider pain team on 8/12 if refractory      Recent L4-L5 discitis/osteomyelitis  Neck stiffness  -Completed 6 weeks course of IV vancomycin and ertapenem on 8/5.  -Lumbar CT on 7/26/2024 showed resolution of epidural abscess.  -8/6, patient reported neck stiffness.  No clear tenderness to palpation.      Plan  -no acute findings on MRI though spondylosis and stenosis noted  - monitor clinically     Diabetes  -At home on Lantus 8 units twice daily, glipizide 2.5 mg daily, semaglutide 40 mg daily  -Fingerstick glucose around 100-200     Plan  -Decrease Lantus to 4 units twice daily given n.p.o. status.  Will most likely increase back to his home dose on 8/8.  -Continue to hold glipizide and semaglutide     Acute kidney injury on CKD  -Baseline creatinine around 1.1  -Currently on presentation 1.3  -Creatinine improved back to baseline.     Plan  -No further intervention     Gout  Possible gout attack  -Uric acid elevated around 9.  Unclear why he was not on any uric acid lowering agents.  -Patient has significant pain of his right thumb, right elbow.   Erythema and warmth of the right forearm and right thumb.  -Possible acute gout attack versus infection.     Plan  -Continue home colchicine  -Would not start any uric acid lowering agents at this point given possible acute gout attack.     Anemia of chronic inflammation with possible component of iron deficiency anemia  -Iron level low at 17 with iron sat index of 9.  Ferritin within normal limits, however, ferritin is an acute phase reactant  -Hemoglobin dropped from around 11-9.2.  No signs of active bleeding.    Plan  -Continue to monitor hemoglobin.  -Would recommend repeating iron studies as an outpatient.    HFpEF  -At home on torsemide 20 mg daily  -Appears euvolemic of his examination. Stable. On RA.      Plan  -PTA Torsemide     Hypertension  -At home on lisinopril 10 mg daily, torsemide 20 mg daily  -Blood pressures currently around 140/60.  -No indication for strict blood pressure control during this hospitalization.     Plan  -PTA Lisinopril          Diet: Snacks/Supplements Adult: Glucerna; Between Meals  Regular Diet Adult    DVT Prophylaxis: Enoxaparin (Lovenox) SQ  Barnes Catheter: Not present  Lines: None       Cardiac Monitoring: None  Code Status: Full Code      Clinically Significant Risk Factors              # Hypoalbuminemia: Lowest albumin = 3 g/dL at 8/4/2024  9:24 PM, will monitor as appropriate     # Hypertension: Noted on problem list    # Chronic heart failure with preserved ejection fraction: heart failure noted on problem list and last echo with EF >50%          # DMII: A1C = 8.0 % (Ref range: <5.7 %) within past 6 months   # Overweight: Estimated body mass index is 25.9 kg/m  as calculated from the following:    Height as of this encounter: 1.829 m (6').    Weight as of this encounter: 86.6 kg (191 lb).        # Financial/Environmental Concerns: none         Disposition Plan     Medically Ready for Discharge: Anticipated in 2-4 Days          Luisito Flanagan MD  Hospitalist Service  [facility]  [facility]  Securely message with Vim[employer] (more info)  Text page via [employer] Paging/Directory   ______________________________________________________________________    Interval History   New writer  KJ.   Feeling about same as yesterday.   States his pain does at times feel intolerable  We discussed adding more pain control  Given renal function is now normalized, will add up to x2 low-dose toradol  Also can increase colchicine to BID  Discussed w/ RN      Physical Exam   Vital Signs: Temp: 98.3  F (36.8  C) Temp src: Oral BP: (!) 156/72 Pulse: 88   Resp: 18 SpO2: 94 % O2 Device: None (Room air) Oxygen Delivery: 2 LPM  Weight: 191 lbs 0 oz    Gen:Well appearing, well nourished, in no acute distress, pleasant and conversant  HEENT: NC/AT, MMM, JOLIE, EOMI, Supple, no TTP of C-spine  CV: RRR, normal s1, normal s2, no m/r/g  Resp: CTAB, normal I/E effort, no additional respiratory sounds  Abd: +BS, non-tender, non-distended, no guarding or rebound tenderness  Ext: stable Sling, bandage/splint right arm/wrist, all dressings C/D/I. Otherwise No significant deformities or trauma, moving all ext freely  Skin: No erythema, no lesions or rashes.   Neuro:No focal neurologic deficit, AxOx4. Strength and sensation grossly intact  Psych: Pleasant, answering questions appropriately, normal mood/affect. Insight good, judgement intact.       Medical Decision Making       52 MINUTES SPENT BY ME on the date of service doing chart review, history, exam, documentation & further activities per the note.      Data         Imaging results reviewed over the past 24 hrs:   Recent Results (from the past 24 hour(s))   MR Cervical Spine w/o & w Contrast    Narrative    EXAM: MR CERVICAL SPINE W/O and W CONTRAST  LOCATION: [facility]  DATE: 8/10/2024    INDICATION: neck pain, history of discitis, infection source?  COMPARISON: Cervical spine CT: 5/16/2024.  CONTRAST: 9ml yue  TECHNIQUE: MRI  Cervical Spine without and with IV contrast.    FINDINGS:   Vertebral body heights are maintained. No suspicious focal marrow replacing lesions. No focal marrow edema. Alignment is similar to prior with trace anterolisthesis of C4 on C5 and C5 on C6. No abnormal cord signal. No pathologic contrast enhancement or   other findings to suggest infectious process in the cervical spine. No extraspinal abnormality.    Craniovertebral junction and C1-C2: Atlantodental degenerative changes with severe narrowing of the atlantodental interval. No significant narrowing of the CSF space at the craniocervical junction. Cerebellar tonsils in normal positioning.    C2-C3: Mild disc height loss. Shallow posterior disc osteophyte complex with mild bilateral uncinate spurring. Mild bilateral facet hypertrophy. No substantial spinal canal stenosis. No substantial neural foraminal stenosis.     C3-C4: Mild disc height loss. Broad posterior disc bulge with small central disc protrusion component. Advanced right and mild left facet hypertrophy. Bilateral uncinate spurring. Slight ligamentum flavum thickening. Moderate to advanced spinal canal   stenosis. Advanced right and moderate to advanced left neural foraminal stenosis.    C4-C5: Mild disc height loss. Anterolisthesis with disc uncovering. Bilateral uncinate spurring. Advanced left and moderate right facet hypertrophy. Ligamentum flavum thickening. Advanced spinal canal stenosis. Advanced left and moderate right neural   foraminal stenosis.     C5-C6: Mild disc height loss. Trace anterolisthesis. No disc herniation. Prominent right uncinate spurring/hypertrophy. Mild bilateral facet hypertrophy. No substantial central spinal canal stenosis. Advanced right and mild left neural foraminal   stenosis.     C6-C7: Moderate to advanced disc height loss. Shallow broad-based posterior disc osteophyte complex with bilateral uncinate spurring. Mild bilateral facet arthropathy. Mild central  spinal canal stenosis. Mild to moderate left neural foraminal stenosis.   No substantial right neural foraminal stenosis.     C7-T1: Moderate to advanced disc height loss. Shallow broad-based posterior disc osteophyte complex. Mild bilateral facet arthropathy. No substantial spinal canal or neural foraminal stenosis.      Impression    IMPRESSION:    1.  No evidence for infectious process involving the cervical spine as clinically queried.  2.  Multilevel cervical spondylosis as detailed above.  3.  Spinal canal stenosis is greatest and advanced at C4-C5 and moderate to advanced at C3-C4.  4.  Neural foraminal stenosis is greatest and advanced on the right at C3-C4 and C5-C6 as well as on the left at C4-C5. There is also moderate to advanced left neural foraminal stenosis at C3-C4 and moderate right neural foraminal stenosis at C4-C5.  5.  No cord signal abnormality.

## 2024-08-11 NOTE — PROGRESS NOTES
[facility] Orthopa[facility] Progress Note      Post-operative Day:  4 Day Post-Op    Procedure(s):  IRRIGATION AND DEBRIDEMENT, SHOULDER  EXCISION, CLAVICLE, DISTAL  IRRIGATION AND DEBRIDEMENT, ELBOW AND THUMB      Subjective:  Lance is in bed eating breakfast upon arrival. States he is doing better each day. However still has pain and concerns in his right knee. He is able to flex to 30 degrees this morning. He states the elbow and shoulder and MCP are all fine with minimal pain.   Pain: minimal in thumb and elbow. Shoulder is minimally painful . Right knee moderate.  Chest pain, SOB:  No      Objective:  Blood pressure (!) 148/69, pulse 98, temperature 98.7  F (37.1  C), temperature source Oral, resp. rate 14, height 1.829 m (6'), weight 86.6 kg (191 lb), SpO2 93%.    Patient Vitals for the past 24 hrs:   BP Temp Temp src Pulse Resp SpO2   08/10/24 2334 -- -- -- -- -- 93 %   08/10/24 2331 (!) 148/69 98.7  F (37.1  C) Oral 98 14 (!) 86 %   08/10/24 1537 (!) 168/75 98.1  F (36.7  C) Oral 93 18 95 %   08/10/24 1315 (!) 146/74 -- -- -- -- --   08/10/24 0746 (!) 159/74 98.1  F (36.7  C) Oral 80 16 95 %       Wt Readings from Last 4 Encounters:   08/04/24 86.6 kg (191 lb)   07/29/24 86.2 kg (190 lb)   07/25/24 87.1 kg (192 lb)   07/19/24 86.6 kg (191 lb)         Motor function, sensation, and circulation of bilateral lower extremities intact   Yes  Wound status: incisions are clean dry and intact.  Post op dressing covering incisions, no drainage noted on bandages. Elbow drain in place and patent with minimal drainage  . Band-Aid on knee s/p aspiration intact. No erythema or drainage from site.  Post op dressing intact. Yes  Calf tenderness: Bilateral  No    Pertinent Labs   Lab Results: personally reviewed.     Recent Labs   Lab Test 08/07/24  0645 08/05/24  0640 08/04/24  2124 08/01/24  0835 06/20/24  0829 06/19/24  1316 04/26/24  0852 04/26/24  0430 08/26/22  0942 08/25/22  1746 06/16/22  0934  12/25/21 2000 07/29/21  0657 07/28/21  0337 04/29/21  1631 07/28/20  0626   INR  --   --   --   --   --  1.04  --  1.11  --   --   --  1.17*  --   --   --   --    WBC 9.0  --  11.8* 6.4   < >  --    < > 11.4*   < > 12.2*   < > 18.6*   < > 8.9   < > 7.4   HGB 9.2*  --  11.1* 11.7*   < >  --    < > 11.9*   < > 10.9*   < > 12.7*   < > 12.1*   < > 10.7*   HCT 29.2*  --  35.4* 39.9*   < >  --    < > 35.5*   < > 34.0*   < > 40.0   < > 38.1*   < > 35.2*   MCV 86  --  87 94   < >  --    < > 87   < > 89   < > 91   < > 97   < > 92     --  360 325   < >  --    < > 253   < > 441   < > 379   < > 264   < > 219    139 139  --    < >  --    < > 138   < > 138   < > 136   < > 139   < > 137   CRP  --   --   --   --   --   --   --   --   --  4.7*  --   --   --  84.2*  --  48.3*    < > = values in this interval not displayed.       Plan: Anticoagulation protocol: ASA 81 mg daily              Pain medications: oxycodone, dilaudid, tylenol, and Robaxin            Weight bearing status:  femi AGUILA for ROM as tolerated, sling PRN                       Disposition:  Based on no growth on cultures, we continue to monitor knee. Will reaccess once final result back. Shoulder,elbow and MCP healing well with no concerns at this time. Consider TCU placement.               Continue cares and rehabilitation     Report completed by:  Marcio Magallon PA-C  Date: 8/11/2024  Time: 7:46  AM

## 2024-08-11 NOTE — PLAN OF CARE
Patient vital signs are at baseline: Yes  Patient able to ambulate as they were prior to admission or with assist devices provided by therapies during their stay:  No,  Reason:  Patient too painful but was able to sit at the edge of the bed and work with Occupational therapy this morning.  Patient MUST void prior to discharge:  Yes  Patient able to tolerate oral intake:  Yes  Pain has adequate pain control using Oral analgesics:  Yes  Does patient have an identified :  Yes  Has goal D/C date and time been discussed with patient:  Yes

## 2024-08-11 NOTE — PLAN OF CARE
Problem: Pain Acute  Goal: Optimal Pain Control and Function  Intervention: Prevent or Manage Pain  Recent Flowsheet Documentation  Taken 8/10/2024 2000 by Antoinette Ferreira, RN  Sensory Stimulation Regulation:   care clustered   lighting decreased   quiet environment promoted  Medication Review/Management: medications reviewed     Problem: Risk for Delirium  Goal: Improved Attention and Thought Clarity  Intervention: Maximize Cognitive Function  Recent Flowsheet Documentation  Taken 8/10/2024 2000 by Antoinette Ferreira, RN  Sensory Stimulation Regulation:   care clustered   lighting decreased   quiet environment promoted  Reorientation Measures:   calendar in view   clock in view     A/Ox4, using call light appropriately. Pain well controlled overnight with nonpharmacologic techniques. Reports baseline diabetic neuropathy in BLE, CMS intact otherwise. VSS on 2L via nasal cannula while sleeping. Not OOB during shift. Sling in place.

## 2024-08-11 NOTE — PLAN OF CARE
Goal Outcome Evaluation:    Patient vital signs are at baseline: No,  Reason:  Hypertensive  Patient able to ambulate as they were prior to admission or with assist devices provided by therapies during their stay:  No,  Reason:  patient remains bedrest, assisted with repositioning  Patient MUST void prior to discharge:  Yes, external catheter in place  Patient able to tolerate oral intake:  Yes  Pain has adequate pain control using Oral analgesics:  No,  Reason:  Patient continues to c/o severe pain with movement, IV Dilaudid given before patient went down to MRI  Does patient have an identified :  Yes  Has goal D/C date and time been discussed with patient:  Yes

## 2024-08-11 NOTE — PHARMACY-VANCOMYCIN DOSING SERVICE
Pharmacy Vancomycin Note  Date of Service 2024  Patient's  [id number]   79 year old, male    Indication: Bone and Joint Infection  Day of Therapy: 7  Current vancomycin regimen:  1000 mg IV q12h  Current vancomycin monitoring method: AUC  Current vancomycin therapeutic monitoring goal: 400-600 mg*h/L    InsightRX Prediction of Current Vancomycin Regimen  Regimen: 1000 mg IV every 12 hours.  Start time: 11:23 on 2024  Exposure target: AUC24 (range)400-600 mg/L.hr   AUC24,ss: 541 mg/L.hr  Probability of AUC24 > 400: 99 %  Ctrough,ss: 19 mg/L  Probability of Ctrough,ss > 20: 42 %  Probability of nephrotoxicity (Lodise LATISHA ): 16 %    Current estimated CrCl = Estimated Creatinine Clearance: 76 mL/min (based on SCr of 0.95 mg/dL).    Creatinine for last 3 days  8/10/2024:  7:31 AM Creatinine 0.95 mg/dL    Recent Vancomycin Levels (past 3 days)  2024:  8:20 AM Vancomycin 13.0 ug/mL  2024:  6:27 AM Vancomycin 23.7 ug/mL    Vancomycin IV Administrations (past 72 hours)                     vancomycin (VANCOCIN) 1,000 mg in NaCl 0.9% 200 mL intermittent infusion (mg) 1,000 mg Given 08/10/24 2323     1,000 mg Given  1031     1,000 mg Given 24 2317     1,000 mg Given  1330     1,000 mg Given 24 2305     1,000 mg Given  1212                    Nephrotoxins and other renal medications (From now, onward)      Start     Dose/Rate Route Frequency Ordered Stop    24 1130  vancomycin (VANCOCIN) 1,250 mg in 0.9% NaCl 250 mL intermittent infusion         1,250 mg  166.7 mL/hr over 90 Minutes Intravenous EVERY 18 HOURS 24 0723      24 1330  torsemide (DEMADEX) tablet 10 mg        Note to Pharmacy: PTA Sig:TAKE 1 TABLET DAILY  Patient taking differently: Take 10 mg by mouth daily      10 mg Oral DAILY 24 1303      24 1330  lisinopril (ZESTRIL) tablet 10 mg        Note to Pharmacy: PTA Sig:Take 1 tablet (10 mg) by mouth every 24 hours      10 mg Oral EVERY 24 HOURS  08/08/24 1303                 Contrast Orders - past 72 hours (72h ago, onward)      Start     Dose/Rate Route Frequency Stop    08/10/24 1630  gadobutrol (GADAVIST) injection 9 mL         9 mL Intravenous ONCE 08/10/24 1652            Interpretation of levels and current regimen:  Vancomycin level is reflective of -600    Has serum creatinine changed greater than 50% in last 72 hours: No    Urine output:  good urine output    Renal Function: Stable    InsightRX Prediction of Planned New Vancomycin Regimen  Regimen: 1250 mg IV every 18 hours.  Start time: 11:23 on 08/11/2024  Exposure target: AUC24 (range)400-600 mg/L.hr   AUC24,ss: 458 mg/L.hr  Probability of AUC24 > 400: 81 %  Ctrough,ss: 14.8 mg/L  Probability of Ctrough,ss > 20: 17 %  Probability of nephrotoxicity (Lodise LATISHA 2009): 10 %    Plan:  Decrease Dose to 1250mg q 18 hours  Vancomycin monitoring method: AUC  Vancomycin therapeutic monitoring goal: 400-600 mg*h/L  Pharmacy will check vancomycin levels as appropriate in 1-3 Days.  Serum creatinine levels will be ordered daily for the first week of therapy and at least twice weekly for subsequent weeks.    Otilio Katz [facility]

## 2024-08-11 NOTE — PROGRESS NOTES
INFECTIOUS DISEASE FOLLOW UP NOTE    Date: 08/11/2024   CHIEF COMPLAINT:   Chief Complaint   Patient presents with    Shoulder Pain    Fever        ASSESSMENT:    Comorbid conditions affecting immune system: Type II Diabetes mellitus   Active Problems:    Acute pain of right shoulder    Fever, unspecified fever cause     Right Shoulder Pain  Concern for right shoulder septic Arthritis  -acute onset  -CRP elevated on admission 142. WBC 11.8 on admission, improved  -possible gout flare; now with right thumb pain, right elbow pain/bursitis  -blood culture negative to date  -mri ordered of shoulder showing AC joint buritis/inflammation.   -seen by ortho, s/p irrigation and debridement of right shoulder, clavicle, elbow, and thumb 8/7/2024, cultures negative to date  -synovial fluid right elbow showing monosodium urate crystals  -Neck MR without infectious process. Spondylosis and stenosis noted    Right knee pain  -History of right TKA  -xray with small effusion  -concern for PJI, s/p aspiration on 8/9. Elevated wbc and PMNs, but culture negative to date. No crystals seen     Recent L4-L5 discitis/osteomyelitis  -s/p aspiration on 6/19 - culture negative  -treated with 6 weeks IV vancomycin and Ertapenem - completed  -Repeat lumbar CT on 7/26/2024 showed resolution of epidural abscess     Recommendations:   -Continue vancomycin  -follow-up on cultures from knee  -hold off on steroids for now, however if cultures remain negative, would pivot toward gout treatment - however upper ext seem to be improving    Koko Wolfe MD  [facility]  Direct messaging: FRUCT Paging   On-Call ID provider: [phone], option: 9     ______________________________________________________________________    SUBJECTIVE / INTERVAL HISTORY:  Right knee is still painful. Limited mobility. Was able to get to MRI table yesterday. Tolerating antibiotics.      OBJECTIVE:  BP (!) 156/72 (BP Location: Left arm,  Patient Position: Semi-Davis's, Cuff Size: Adult Regular)   Pulse 88   Temp 98.3  F (36.8  C) (Oral)   Resp 18   Ht 1.829 m (6')   Wt 86.6 kg (191 lb)   SpO2 94%   BMI 25.90 kg/m       FiO2 (%): 91 %  Resp: 18    GEN: No acute distress.    RESPIRATORY:  Normal breathing pattern. Clear to auscultation bilaterally  CARDIOVASCULAR:  Regular rate and rhythm. No murmur, click, gallop or rub.   ABDOMEN:  Soft, normal bowel sounds, non-tender,   EXTREMITIES: more range of motion in shoulder and elbow. Right knee is warm. Pain with passive movement  SKIN/HAIR/NAILS:  No rashes    Pertinent labs:  CRP Inflammation   Date Value Ref Range Status   07/28/2020 48.3 (H) 0.0 - 8.0 mg/L Final     CRP   Date Value Ref Range Status   08/25/2022 4.7 (H) 0.0 - <0.8 mg/dL Final      CBC RESULTS:   Recent Labs   Lab Test 08/08/24  0618   WBC 8.8   RBC 3.49*   HGB 9.6*   HCT 30.7*   MCV 88   MCH 27.5   MCHC 31.3*   RDW 15.7*         Last Comprehensive Metabolic Panel:  Sodium   Date Value Ref Range Status   08/08/2024 139 135 - 145 mmol/L Final   04/29/2021 139 133 - 144 mmol/L Final     Potassium   Date Value Ref Range Status   08/08/2024 4.1 3.4 - 5.3 mmol/L Final   08/27/2022 4.6 3.5 - 5.0 mmol/L Final   04/29/2021 4.7 3.4 - 5.3 mmol/L Final     Chloride   Date Value Ref Range Status   08/08/2024 102 98 - 107 mmol/L Final   08/27/2022 108 (H) 98 - 107 mmol/L Final   04/29/2021 107 94 - 109 mmol/L Final     Carbon Dioxide   Date Value Ref Range Status   04/29/2021 27 20 - 32 mmol/L Final     Carbon Dioxide (CO2)   Date Value Ref Range Status   08/08/2024 27 22 - 29 mmol/L Final   08/27/2022 22 22 - 31 mmol/L Final     Anion Gap   Date Value Ref Range Status   08/08/2024 10 7 - 15 mmol/L Final   08/27/2022 8 5 - 18 mmol/L Final   04/29/2021 5 3 - 14 mmol/L Final     Glucose   Date Value Ref Range Status   08/27/2022 131 (H) 70 - 125 mg/dL Final   04/29/2021 212 (H) 70 - 99 mg/dL Final     GLUCOSE BY METER POCT   Date Value  Ref Range Status   08/11/2024 123 (H) 70 - 99 mg/dL Final     Urea Nitrogen   Date Value Ref Range Status   08/08/2024 14.5 8.0 - 23.0 mg/dL Final   08/27/2022 16 8 - 28 mg/dL Final   04/29/2021 27 7 - 30 mg/dL Final     Creatinine   Date Value Ref Range Status   08/10/2024 0.95 0.67 - 1.17 mg/dL Final   04/29/2021 1.19 0.66 - 1.25 mg/dL Final     GFR Estimate   Date Value Ref Range Status   08/10/2024 81 >60 mL/min/1.73m2 Final     Comment:     eGFR calculated using 2021 CKD-EPI equation.   04/29/2021 59 (L) >60 mL/min/[1.73_m2] Final     Comment:     Non  GFR Calc  Starting 12/18/2018, serum creatinine based estimated GFR (eGFR) will be   calculated using the Chronic Kidney Disease Epidemiology Collaboration   (CKD-EPI) equation.       GFR, ESTIMATED POCT   Date Value Ref Range Status   12/03/2021 15 (L) >60 mL/min/1.73m2 Final     Calcium   Date Value Ref Range Status   08/08/2024 9.2 8.8 - 10.4 mg/dL Final     Comment:     Reference intervals for this test were updated on 7/16/2024 to reflect our healthy population more accurately. There may be differences in the flagging of prior results with similar values performed with this method. Those prior results can be interpreted in the context of the updated reference intervals.   04/29/2021 9.3 8.5 - 10.1 mg/dL Final        MICROBIOLOGY DATA:  Personally reviewed.  7-Day Micro Results       Collected Updated Procedure Result Status      08/09/2024 1231 08/09/2024 1403 Cell count with differential fluid [[id number]]    (Abnormal)   Synovial fluid from Shoulder, Right    Final result Component Value   No component results            08/09/2024 1231 08/10/2024 1806 Anaerobic Bacterial Culture Routine [[id number]]   Synovial fluid from Shoulder, Right    Preliminary result Component Value   Culture No anaerobic organisms isolated after 1 day  [P]                08/09/2024 1231 08/09/2024 1403 Cell Count Body Fluid [[id number]]    (Abnormal)    Synovial fluid from Shoulder, Right    Final result Component Value Units   Color Yellow    Clarity Hazy    Cell Count Fluid Source Knee, Right    Total Nucleated Cells 2,851 /uL            08/09/2024 1231 08/09/2024 1403 Differential Body Fluid [[id number]]    Synovial fluid from Shoulder, Right    Final result Component Value Units   % Neutrophils 80 %   % Lymphocytes 6 %   % Monocyte/Macrophages 13 %   % Lining Cells 1 %            08/09/2024 1231 08/09/2024 1417 Crystal ID Synovial Fluid [[id number]]   Synovial fluid from Shoulder, Right    Final result Component Value   Crystals Analysis No clinically significant crystals seen.            08/09/2024 1230 08/10/2024 1126 Synovial fluid Aerobic Bacterial Culture Routine With Gram Stain [[id number]]   Synovial fluid from Shoulder, Right    Preliminary result Component Value   Culture No growth, less than 1 day  [P]    Gram Stain Result No organisms seen  [P]     3+ WBC seen  [P]     Predominantly PMNs               08/07/2024 1848 08/10/2024 2216 Anaerobic Bacterial Culture Routine [[id number]]   Tissue from Thumb, Right    Preliminary result Component Value   Culture No anaerobic organisms isolated after 3 days  [P]                08/07/2024 1848 08/07/2024 2236 Gram Stain [[id number]]   Tissue from Thumb, Right    Final result Component Value   GS Culture See corresponding culture for results   Gram Stain Result No organisms seen   Gram Stain Result 1+ WBC seen            08/07/2024 1848 08/11/2024 0706 Tissue Aerobic Bacterial Culture Routine [[id number]]   Tissue from Thumb, Right    Preliminary result Component Value   Culture No growth after 3 days  [P]                08/07/2024 1835 08/10/2024 2231 Anaerobic Bacterial Culture Routine [[id number]]   Tissue from Clavicle, Right    Preliminary result Component Value   Culture No anaerobic organisms isolated after 3 days  [P]                08/07/2024 1835 08/07/2024 2242 Gram Stain  [[id number]]   Tissue from Clavicle, Right    Final result Component Value   GS Culture See corresponding culture for results   Gram Stain Result No organisms seen   Gram Stain Result 3+ WBC seen   Predominantly PMNs            08/07/2024 1835 08/11/2024 0706 Tissue Aerobic Bacterial Culture Routine [[id number]]   Tissue from Clavicle, Right    Preliminary result Component Value   Culture No growth after 3 days  [P]                08/07/2024 1828 08/10/2024 2201 Anaerobic Bacterial Culture Routine [[id number]]   Wound from Elbow, Right    Preliminary result Component Value   Culture No anaerobic organisms isolated after 3 days  [P]                08/07/2024 1828 08/07/2024 2223 Gram Stain [[id number]]   Wound from Elbow, Right    Final result Component Value   GS Culture See corresponding culture for results   Gram Stain Result No organisms seen   Gram Stain Result 1+ WBC seen            08/07/2024 1828 08/09/2024 1000 Wound Aerobic Bacterial Culture Routine [[id number]]   Wound from Elbow, Right    Final result Component Value   Culture No Growth               08/07/2024 1824 08/10/2024 2216 Anaerobic Bacterial Culture Routine [[id number]]   Wound from Elbow, Right    Preliminary result Component Value   Culture No anaerobic organisms isolated after 3 days  [P]                08/07/2024 1824 08/07/2024 2225 Gram Stain [[id number]]   Wound from Elbow, Right    Final result Component Value   GS Culture See corresponding culture for results   Gram Stain Result No organisms seen   Gram Stain Result 1+ WBC seen            08/07/2024 1824 08/09/2024 1000 Wound Aerobic Bacterial Culture Routine [[id number]]   Wound from Elbow, Right    Final result Component Value   Culture No Growth               08/07/2024 1823 08/07/2024 2014 Crystal ID Synovial Fluid [[id number]]   (Abnormal)   Synovial fluid from Elbow, Right    Final result Component Value   Crystals Analysis Positive for extracellular crystals,  consistent with monosodium urate crystals.            08/07/2024 1823 08/07/2024 2028 Cell count with differential fluid [[id number]]    (Abnormal)   Synovial fluid from Elbow, Right    Final result Component Value   No component results            08/07/2024 1823 08/07/2024 2025 Cell Count Body Fluid [[id number]]    (Abnormal)   Synovial fluid from Elbow, Right    Final result Component Value   Color Red   Clarity Turbid   Cell Count Fluid Source Elbow, Right   Right Olecranon Bursa Fluid #2   Total Nucleated Cells --   Specimen clotted, result unavailable.            08/07/2024 1823 08/07/2024 2028 Differential Body Fluid [[id number]]    Synovial fluid from Elbow, Right    Final result Component Value   % Neutrophils --   % Lymphocytes --   % Monocyte/Macrophages --            08/07/2024 1820 08/10/2024 2216 Anaerobic Bacterial Culture Routine [[id number]]   Tissue from Elbow, Right    Preliminary result Component Value   Culture No anaerobic organisms isolated after 3 days  [P]                08/07/2024 1820 08/07/2024 2239 Gram Stain [[id number]]   Tissue from Elbow, Right    Final result Component Value   GS Culture See corresponding culture for results   Gram Stain Result No organisms seen   Gram Stain Result 1+ WBC seen            08/07/2024 1820 08/11/2024 0706 Tissue Aerobic Bacterial Culture Routine [[id number]]   Tissue from Elbow, Right    Preliminary result Component Value   Culture No growth after 3 days  [P]                08/07/2024 1818 08/10/2024 2216 Anaerobic Bacterial Culture Routine [[id number]]   Wound from Elbow, Right    Preliminary result Component Value   Culture No anaerobic organisms isolated after 3 days  [P]                08/07/2024 1818 08/07/2024 2234 Gram Stain [[id number]]   Wound from Elbow, Right    Final result Component Value   GS Culture See corresponding culture for results   Gram Stain Result No organisms seen   Gram Stain Result 1+ WBC seen             08/07/2024 1818 08/09/2024 1000 Wound Aerobic Bacterial Culture Routine [[id number]]   Wound from Elbow, Right    Final result Component Value   Culture No Growth               08/07/2024 1815 08/07/2024 2130 Cell count with differential fluid [[id number]]    (Abnormal)   Synovial fluid from Elbow, Right    Final result Component Value   No component results            08/07/2024 1815 08/07/2024 1935 Crystal ID Synovial Fluid [[id number]]   (Abnormal)   Synovial fluid from Elbow, Right    Final result Component Value   Crystals Analysis Positive for intracellular crystals, consistent with monosodium urate crystals.            08/07/2024 1815 08/07/2024 2129 Cell Count Body Fluid [[id number]]    (Abnormal)   Synovial fluid from Elbow, Right    Final result Component Value   Color Red   Clarity Cloudy   Cell Count Fluid Source Elbow, Right   Total Nucleated Cells --   Specimen Clotted- Cell count not performed.            08/07/2024 1815 08/07/2024 2130 Differential Body Fluid [[id number]]    Synovial fluid from Elbow, Right    Final result Component Value   No component results            08/04/2024 2124 08/09/2024 2146 Blood Culture Peripheral Blood [[id number]]   Peripheral Blood    Final result Component Value   Culture No Growth                        RADIOLOGY:  Personally Reviewed.  No results found for this or any previous visit (from the past 24 hour(s)).    Active Problems:    Acute pain of right shoulder    Fever, unspecified fever cause     Attestation:  I have reviewed today's Medications, Vital Signs, and Labs.

## 2024-08-12 ENCOUNTER — APPOINTMENT (OUTPATIENT)
Dept: CARDIOLOGY | Facility: CLINIC | Age: 79
DRG: 464 | End: 2024-08-12
Payer: COMMERCIAL

## 2024-08-12 ENCOUNTER — APPOINTMENT (OUTPATIENT)
Dept: PHYSICAL THERAPY | Facility: CLINIC | Age: 79
DRG: 464 | End: 2024-08-12
Payer: COMMERCIAL

## 2024-08-12 LAB
ANION GAP SERPL CALCULATED.3IONS-SCNC: 7 MMOL/L (ref 7–15)
BACTERIA TISS BX CULT: NO GROWTH
BUN SERPL-MCNC: 18.3 MG/DL (ref 8–23)
CALCIUM SERPL-MCNC: 8.8 MG/DL (ref 8.8–10.4)
CHLORIDE SERPL-SCNC: 104 MMOL/L (ref 98–107)
CREAT SERPL-MCNC: 1.06 MG/DL (ref 0.67–1.17)
EGFRCR SERPLBLD CKD-EPI 2021: 71 ML/MIN/1.73M2
GLUCOSE BLDC GLUCOMTR-MCNC: 129 MG/DL (ref 70–99)
GLUCOSE BLDC GLUCOMTR-MCNC: 188 MG/DL (ref 70–99)
GLUCOSE BLDC GLUCOMTR-MCNC: 200 MG/DL (ref 70–99)
GLUCOSE BLDC GLUCOMTR-MCNC: 292 MG/DL (ref 70–99)
GLUCOSE SERPL-MCNC: 151 MG/DL (ref 70–99)
HCO3 SERPL-SCNC: 29 MMOL/L (ref 22–29)
HGB BLD-MCNC: 8.6 G/DL (ref 13.3–17.7)
LVEF ECHO: NORMAL
POTASSIUM SERPL-SCNC: 3.9 MMOL/L (ref 3.4–5.3)
SODIUM SERPL-SCNC: 140 MMOL/L (ref 135–145)

## 2024-08-12 PROCEDURE — 99221 1ST HOSP IP/OBS SF/LOW 40: CPT | Performed by: PHYSICIAN ASSISTANT

## 2024-08-12 PROCEDURE — 250N000013 HC RX MED GY IP 250 OP 250 PS 637: Performed by: STUDENT IN AN ORGANIZED HEALTH CARE EDUCATION/TRAINING PROGRAM

## 2024-08-12 PROCEDURE — 255N000002 HC RX 255 OP 636: Performed by: STUDENT IN AN ORGANIZED HEALTH CARE EDUCATION/TRAINING PROGRAM

## 2024-08-12 PROCEDURE — 93306 TTE W/DOPPLER COMPLETE: CPT | Mod: 26 | Performed by: STUDENT IN AN ORGANIZED HEALTH CARE EDUCATION/TRAINING PROGRAM

## 2024-08-12 PROCEDURE — 120N000001 HC R&B MED SURG/OB

## 2024-08-12 PROCEDURE — 36415 COLL VENOUS BLD VENIPUNCTURE: CPT | Performed by: STUDENT IN AN ORGANIZED HEALTH CARE EDUCATION/TRAINING PROGRAM

## 2024-08-12 PROCEDURE — 250N000011 HC RX IP 250 OP 636: Performed by: STUDENT IN AN ORGANIZED HEALTH CARE EDUCATION/TRAINING PROGRAM

## 2024-08-12 PROCEDURE — 80048 BASIC METABOLIC PNL TOTAL CA: CPT | Performed by: STUDENT IN AN ORGANIZED HEALTH CARE EDUCATION/TRAINING PROGRAM

## 2024-08-12 PROCEDURE — 99233 SBSQ HOSP IP/OBS HIGH 50: CPT | Mod: GC | Performed by: STUDENT IN AN ORGANIZED HEALTH CARE EDUCATION/TRAINING PROGRAM

## 2024-08-12 PROCEDURE — 97110 THERAPEUTIC EXERCISES: CPT | Mod: GP

## 2024-08-12 PROCEDURE — 85018 HEMOGLOBIN: CPT | Performed by: STUDENT IN AN ORGANIZED HEALTH CARE EDUCATION/TRAINING PROGRAM

## 2024-08-12 PROCEDURE — 99232 SBSQ HOSP IP/OBS MODERATE 35: CPT | Performed by: STUDENT IN AN ORGANIZED HEALTH CARE EDUCATION/TRAINING PROGRAM

## 2024-08-12 PROCEDURE — 250N000013 HC RX MED GY IP 250 OP 250 PS 637: Performed by: EMERGENCY MEDICINE

## 2024-08-12 PROCEDURE — 258N000003 HC RX IP 258 OP 636: Performed by: STUDENT IN AN ORGANIZED HEALTH CARE EDUCATION/TRAINING PROGRAM

## 2024-08-12 PROCEDURE — 97530 THERAPEUTIC ACTIVITIES: CPT | Mod: GP

## 2024-08-12 RX ORDER — METHOCARBAMOL 500 MG/1
500 TABLET, FILM COATED ORAL ONCE
Status: COMPLETED | OUTPATIENT
Start: 2024-08-12 | End: 2024-08-12

## 2024-08-12 RX ADMIN — INSULIN ASPART 2 UNITS: 100 INJECTION, SOLUTION INTRAVENOUS; SUBCUTANEOUS at 17:22

## 2024-08-12 RX ADMIN — POLYETHYLENE GLYCOL AND PROPYLENE GLYCOL 1 DROP: 4; 3 SOLUTION/ DROPS OPHTHALMIC at 21:10

## 2024-08-12 RX ADMIN — SODIUM BICARBONATE 1950 MG: 650 TABLET ORAL at 13:56

## 2024-08-12 RX ADMIN — GABAPENTIN 300 MG: 300 CAPSULE ORAL at 21:09

## 2024-08-12 RX ADMIN — ASPIRIN 81 MG: 81 TABLET, COATED ORAL at 09:31

## 2024-08-12 RX ADMIN — ENOXAPARIN SODIUM 40 MG: 100 INJECTION SUBCUTANEOUS at 10:39

## 2024-08-12 RX ADMIN — VANCOMYCIN HYDROCHLORIDE 1250 MG: 5 INJECTION, POWDER, LYOPHILIZED, FOR SOLUTION INTRAVENOUS at 05:46

## 2024-08-12 RX ADMIN — INSULIN GLARGINE 4 UNITS: 100 INJECTION, SOLUTION SUBCUTANEOUS at 21:14

## 2024-08-12 RX ADMIN — METHOCARBAMOL 500 MG: 500 TABLET ORAL at 10:39

## 2024-08-12 RX ADMIN — PERFLUTREN 2 ML: 6.52 INJECTION, SUSPENSION INTRAVENOUS at 13:40

## 2024-08-12 RX ADMIN — COLCHICINE 0.6 MG: 0.6 TABLET ORAL at 21:09

## 2024-08-12 RX ADMIN — TORSEMIDE 10 MG: 5 TABLET ORAL at 09:31

## 2024-08-12 RX ADMIN — INSULIN ASPART 4 UNITS: 100 INJECTION, SOLUTION INTRAVENOUS; SUBCUTANEOUS at 11:58

## 2024-08-12 RX ADMIN — LISINOPRIL 10 MG: 10 TABLET ORAL at 13:55

## 2024-08-12 RX ADMIN — Medication 2 CAPSULE: at 09:31

## 2024-08-12 RX ADMIN — FINASTERIDE 5 MG: 5 TABLET, FILM COATED ORAL at 09:32

## 2024-08-12 RX ADMIN — GABAPENTIN 300 MG: 300 CAPSULE ORAL at 13:56

## 2024-08-12 RX ADMIN — COLCHICINE 0.6 MG: 0.6 TABLET ORAL at 09:31

## 2024-08-12 RX ADMIN — SODIUM BICARBONATE 1950 MG: 650 TABLET ORAL at 09:32

## 2024-08-12 RX ADMIN — SODIUM BICARBONATE 1950 MG: 650 TABLET ORAL at 21:09

## 2024-08-12 RX ADMIN — INSULIN GLARGINE 4 UNITS: 100 INJECTION, SOLUTION SUBCUTANEOUS at 09:32

## 2024-08-12 RX ADMIN — GABAPENTIN 300 MG: 300 CAPSULE ORAL at 09:30

## 2024-08-12 ASSESSMENT — ACTIVITIES OF DAILY LIVING (ADL)
ADLS_ACUITY_SCORE: 59
ADLS_ACUITY_SCORE: 59
ADLS_ACUITY_SCORE: 53
ADLS_ACUITY_SCORE: 59
ADLS_ACUITY_SCORE: 63
ADLS_ACUITY_SCORE: 59
ADLS_ACUITY_SCORE: 59
ADLS_ACUITY_SCORE: 63
ADLS_ACUITY_SCORE: 59
ADLS_ACUITY_SCORE: 53
ADLS_ACUITY_SCORE: 63
ADLS_ACUITY_SCORE: 59
ADLS_ACUITY_SCORE: 59

## 2024-08-12 NOTE — CONSULTS
Neurosurgery Consult    HPI    Mr. Ibrahim is an 79-year-old male who was admitted at [facility] from 6/18 625 with L4-5 discitis, he received antibiotic treatment until 8/5/2024 for Klebsiella bacteremia, on 619/24 he underwent an L4 biopsy and disc aspiration.    He was seen on 7/29/2024 by Dr. Conklin in the neurosurgery clinic, at that time his symptoms were much improved, and he was recommended follow-up as needed.    Imaging at that time lumbar MRI demonstrated resolution of previously seen abscess, with a small amount of residual epidural phlegmon.    Patient underwent a cervical MRI on 8/10/2024 which was negative for infection.    Today patient denies significant neck pain, reports back pain only when he is sitting up and moving with physical therapy, at which time he feels pain in his lumbosacral junction.    He reports currently his main pain is in his right knee when he moves it actively.    Infectious disease has him off of antibiotics at this time to monitor for signs of infection especially in the right knee prosthetic.    Medical history  urosepsis, L4-5 discitis, osteomyelitis, epidural abscess, diabetes mellitus type 2 non-insulin, HFpEF, CKD, gout    Social history  Non-smoker      B/P: 175/79, T: 97.4, P: 80, R: 16       Exam    Alert and oriented no acute distress  Bilateral upper extremities with 5/5 strength  Delia sign negative  Reflexes not tested in the right arm due to sling and cast present  Effect is 1+ triceps and biceps in the left arm    Bilateral lower extremities with 5/5 strength, limited mobility in the right knee due to pain.  Negative straight leg raise bilaterally  Negative ankle clonus       Imaging    Cervical MRI on 8/10/2024 demonstrated    IMPRESSION:     1.  No evidence for infectious process involving the cervical spine as clinically queried.  2.  Multilevel cervical spondylosis as detailed above.  3.  Spinal canal stenosis is greatest and advanced at C4-C5 and  moderate to advanced at C3-C4.  4.  Neural foraminal stenosis is greatest and advanced on the right at C3-C4 and C5-C6 as well as on the left at C4-C5. There is also moderate to advanced left neural foraminal stenosis at C3-C4 and moderate right neural foraminal stenosis at C4-C5.  5.  No cord signal abnormality.    Lumbar MRI in 7/26/2024 demonstrated  IMPRESSION:  1.  Resolution of previously seen intramural abscess with small amount of residual ventral epidural phlegmon. Resolution of previously seen abscesses in the left lumbosacral dorsal paraspinal musculature.  2.  Evolution of osseous changes of the L4 and L5 vertebral bodies with interval loss of intervertebral disc space height.  3.  No high-grade spinal canal stenosis.  4.  Moderate neural foraminal stenosis bilaterally at L4-L5 and on the right at L5-S1.    Assessment    Resolved lumbar abscess, with residual epidural phlegmon  Minimal cervical pain without signs of infection on recent MRI  Right knee pain, status post right prosthetic knee  Recent L4-5 discitis/osteomyelitis status post 6 weeks of IV vancomycin and ertapenem    Plan:      No surgical intervention indicated at this time, recommend continue conservative therapies, back pain could certainly be related to erosive changes and loss of height at the L4-5 level due to the prior infection there.  Patient can follow-up with neurosurgery as an outpatient if his pain persists, or if new signs of infection are noted.    Neurosurgery will sign off.

## 2024-08-12 NOTE — PROGRESS NOTES
[facility]    Medicine Progress Note - Hospitalist Service    Date of Admission:  8/4/2024    Assessment & Plan   Updated summary:   Mr. Ibrahim is an 79 years old man with past medical history significant for HFpEF, diabetes, gout recent hospitalization 5/2024 for urosepsis and bacteremia due to Klebsiella. Eventually diagnosed with L4-L5 discitis and 6/19.  Was placed on IV antibiotic with vancomycin and ertapenem. He presented to the hospital on 8/4/2024 with acute right shoulder pain which progressed to involve the right elbow, right thumb.  Working diagnosis is gout attack versus septic arthritis. MRI of the right shoulder showed signs of right acromioclavicular inflammation with subdeltoid bursitis, possible early osteomyelitis. Evaluated by orthopedic surgery. Plans were for I&D of the right shoulder and right elbow on 8/7.  Of pertinence, CT and MRI 7/26 demonstrated resolution of previously noted intramural abscess; infectious disease following.    Status post Irrigation and debridement of the right AC joint, right olecranon bursa, right elbow joint, right thumb MCP joint, Open distal clavicle excision, right, on 8/7/24, with EBL of 50ccs by Dr. Hanson. Also status post ultrasound with aspiration of right knee.  Currently treating both potential septic joint and gout with, respectively, IV antibiotics and colchicine daily.  MRI Cervical-spine 8/10 without any no acute findings, though spondylosis and stenosis are noted.  New provider on 8/11, patient feels about the same and no new symptoms or concerns.  Following cultures.  Anticoagulation plan is aspirin twice daily for 42 days.  Position is eventually for TCU.  Discussed with/updated social work. Pain spikes and adding on two prn doses of toradol. Continue lidocaine cream. Also increased colchicine to BID.    Vancomycin is discontinued, plan to observe off of IV antibiotics, if the pain improves over the next day, consider  possible steroid tomorrow.  TTE ordered, appreciated.  Orthopedics recommending spine team assess for the cervical/lumbar pain (?new infection or residual, etc.); they are consulted now on 8/12.      ----------    Right shoulder pain  Right thumb pain  -Right shoulder pain started around 2 days prior to this hospitalization.  -WBC mildly elevated.  CRP is elevated.  -Over the last 2 days, patient developed worsening right elbow pain, right thumb pain.  Swelling of the right forearm.  -Currently on vancomycin.  -Blood cultures remain negative to date.  -Working diagnosis gout attack versus septic arthritis.  Discussed with Dr. Hanson on 8/7, given his history of recent discitis, finding on right shoulder MRI, he believes that its less likely gout attack, therefore he would like to proceed with I&D of the right shoulder and the right elbow.    Plan  -Intraoperative cultures noted  -Continue colchicine 0.6 mg increased to BID on 8/12   - consider steroids tomorrow if pain improving OFF abx next 24 hrs, per ID  -Continue current pain management  -Recheck CRP  -Continue vancomycin.  Appreciate infectious disease recommendation   - follow the in-process cultures, follow speciation of organism(s) and subsequently their sensitivities and resistance (S&R) and narrow antibiotics as appropriately    - low dose toradol 15mg prn q6 hrs up to 2 doses for now  - consider pain team  if refractory      Recent L4-L5 discitis/osteomyelitis  Neck stiffness  -Completed 6 weeks course of IV vancomycin and ertapenem on 8/5.  -Lumbar CT on 7/26/2024 showed resolution of epidural abscess.  -8/6, patient reported neck stiffness.  No clear tenderness to palpation.      Plan  -no acute findings on MRI though spondylosis and stenosis noted  - monitor clinically   - Orthopedics recommending spine team assess for the cervical/lumbar pain (?new infection or residual, etc.); they are consulted now on 8/12.      Diabetes  -At home on Lantus 8  units twice daily, glipizide 2.5 mg daily, semaglutide 40 mg daily  -Fingerstick glucose around 100-200     Plan  -Decrease Lantus to 4 units twice daily given n.p.o. status.  Will most likely increase back to his home dose on 8/8.  -Continue to hold glipizide and semaglutide     Acute kidney injury on CKD  -Baseline creatinine around 1.1  -Currently on presentation 1.3  -Creatinine improved back to baseline.     Plan  -No further intervention     Gout  Possible gout attack  -Uric acid elevated around 9.  Unclear why he was not on any uric acid lowering agents.  -Patient has significant pain of his right thumb, right elbow.  Erythema and warmth of the right forearm and right thumb.  -Possible acute gout attack versus infection.     Plan  -Continue home colchicine- now BID  -      Anemia of chronic inflammation with possible component of iron deficiency anemia  -Iron level low at 17 with iron sat index of 9.  Ferritin within normal limits, however, ferritin is an acute phase reactant  -Hemoglobin dropped from around 11-9.2.  No signs of active bleeding.    Plan  -Continue to monitor hemoglobin.  -Would recommend repeating iron studies as an outpatient.  - consider steroids tomorrow if pain improving OFF abx next 24 hrs, per ID  HFpEF  -At home on torsemide 20 mg daily  -Appears euvolemic of his examination. Stable. On RA.      Plan  -PTA Torsemide     Hypertension  -At home on lisinopril 10 mg daily, torsemide 20 mg daily  -Blood pressures currently around 140/60.  -No indication for strict blood pressure control during this hospitalization.     Plan  -PTA Lisinopril          Diet: Snacks/Supplements Adult: Glucerna; Between Meals  Regular Diet Adult    DVT Prophylaxis: Enoxaparin (Lovenox) SQ  Barnes Catheter: Not present  Lines: None       Cardiac Monitoring: None  Code Status: Full Code      Clinically Significant Risk Factors              # Hypoalbuminemia: Lowest albumin = 3 g/dL at 8/4/2024  9:24 PM, will  monitor as appropriate     # Hypertension: Noted on problem list    # Chronic heart failure with preserved ejection fraction: heart failure noted on problem list and last echo with EF >50%          # DMII: A1C = 8.0 % (Ref range: <5.7 %) within past 6 months   # Overweight: Estimated body mass index is 25.9 kg/m  as calculated from the following:    Height as of this encounter: 1.829 m (6').    Weight as of this encounter: 86.6 kg (191 lb).        # Financial/Environmental Concerns: none         Disposition Plan     Medically Ready for Discharge: Anticipated in 2-4 Days          Luisito Flanagan MD  Hospitalist Service  [facility]  Securely message with [employer] (more info)  Text page via [employer] Paging/Directory   ______________________________________________________________________    Interval History   NAEO.   Feeling about same   Notes his pain spikes with working w/ therapies  Discussed disposition  Discussed abx per ID and the bid colchicine and holding on steroids for now  Ortho follow up was pending  Discussed w/ RN  Discussed w/ SW  Ortho rec'd spine input; they are now consulted.  .      Physical Exam   Vital Signs: Temp: 97.4  F (36.3  C) Temp src: Oral BP: (!) 174/82 Pulse: 80   Resp: 16 SpO2: 94 % O2 Device: None (Room air)    Weight: 191 lbs 0 oz    Gen:Well appearing, well nourished, in no acute distress, pleasant and conversant  HEENT: NC/AT, MMM, JOLIE, EOMI, Supple, no TTP of C-spine  CV: RRR, normal s1, normal s2, no m/r/g  Resp: CTAB, normal I/E effort, no additional respiratory sounds  Abd: +BS, non-tender, non-distended, no guarding or rebound tenderness  Ext: stable Sling, bandage/splint right arm/wrist, all dressings C/D/I. Otherwise No significant deformities or trauma, moving all ext freely  Skin: No erythema, no lesions or rashes.   Neuro:No focal neurologic deficit, AxOx4. Strength and sensation grossly intact  Psych: Pleasant, answering questions appropriately,  normal mood/affect. Insight good, judgement intact.       Medical Decision Making       49 MINUTES SPENT BY ME on the date of service doing chart review, history, exam, documentation & further activities per the note.      Data     I have personally reviewed the following data over the past 24 hrs:    N/A  \   8.6 (L)   / N/A     140 104 18.3 /  292 (H)   3.9 29 1.06 \       Imaging results reviewed over the past 24 hrs:   No results found for this or any previous visit (from the past 24 hour(s)).

## 2024-08-12 NOTE — PROGRESS NOTES
INFECTIOUS DISEASE FOLLOW UP NOTE    Date: 08/12/2024   CHIEF COMPLAINT:   Chief Complaint   Patient presents with    Shoulder Pain    Fever        ASSESSMENT:    Comorbid conditions affecting immune system: Type II Diabetes mellitus   Active Problems:    Acute pain of right shoulder    Fever, unspecified fever cause     Right Shoulder Pain  Concern for right shoulder septic Arthritis  -acute onset  -CRP elevated on admission 142. WBC 11.8 on admission, improved  -possible gout flare; now with right thumb pain, right elbow pain/bursitis  -blood culture negative to date  -mri ordered of shoulder showing AC joint buritis/inflammation.   -seen by ortho, s/p irrigation and debridement of right shoulder, clavicle, elbow, and thumb 8/7/2024, cultures negative to date  -synovial fluid right elbow showing monosodium urate crystals  -Neck MR without infectious process. Spondylosis and stenosis noted     Right knee pain  -History of right TKA  -xray with small effusion  -concern for PJI, s/p aspiration on 8/9. Elevated wbc and PMNs, but culture negative to date. No crystals seen.  -uric acid 9.0      Recent L4-L5 discitis/osteomyelitis  -s/p aspiration on 6/19 - culture negative  -treated with 6 weeks IV vancomycin and Ertapenem - completed  -Repeat lumbar CT on 7/26/2024 showed resolution of epidural abscess     Recommendations:   -Discontinue vancomycin, favor acute polyarticular gout flair, will monitor closely for worsening joint pain  -If pain improves without IV antibiotics, would consider possible steroid initiation followed by allopurinol for gout prevention  -follow-up on cultures from knee - NGTD  -TTE ordered - bacterial infection less likely at this point, but want to ensure no sign of bacterial endocarditis    Spencer Edge, DO PGY-3 [facility]     ______________________________________________________________________    SUBJECTIVE / INTERVAL HISTORY:  -Lance reports that he is still  having right knee pain, but improved. He reports that overall, his elbow and thumb pain have resolved. He does affirms some mild anterior shoulder pain, but improved from admission overall. He denies headache, fever, chills, nausea, vomiting, shortness of breath, chest pain.     ROS: All other systems negative except as listed above.    SH/FH/Habits/PMH reviewed and unchanged.    OBJECTIVE:  BP (!) 174/82 (BP Location: Left arm)   Pulse 80   Temp 97.4  F (36.3  C) (Oral)   Resp 16   Ht 1.829 m (6')   Wt 86.6 kg (191 lb)   SpO2 94%   BMI 25.90 kg/m    FiO2 (%): 91 %  Resp: 16      Vital Signs  Temp: 97.4  F (36.3  C)  Temp src: Oral  Resp: 16  Pulse: 80  Pulse Rate Source: Monitor  BP: (!) 174/82  BP Location: Left arm    Temp (24hrs), Av.2  F (36.8  C), Min:97.4  F (36.3  C), Max:98.7  F (37.1  C)      GEN: No acute distress.    RESPIRATORY:  Normal breathing pattern. Clear to auscultation bilaterally  CARDIOVASCULAR:  Regular rate and rhythm. No murmur, click, gallop or rub.   ABDOMEN:  Soft, normal bowel sounds, non-tender,   EXTREMITIES: Pain with passive/active motion of right knee. Pain with palpation of medial aspect of right patella. Left knee without pain.  -Pain present with palpation of anterior right shoulder. Dressing clean, dry, intact.   SKIN/HAIR/NAILS:  No rashes    Pertinent labs:  CRP Inflammation   Date Value Ref Range Status   2020 48.3 (H) 0.0 - 8.0 mg/L Final     CRP   Date Value Ref Range Status   2022 4.7 (H) 0.0 - <0.8 mg/dL Final      CBC RESULTS:   Recent Labs   Lab Test 24  0442 08/10/24  0731   WBC  --  8.5   RBC  --  3.27*   HGB 8.6* 8.8*   HCT  --  28.7*   MCV  --  88   MCH  --  26.9   MCHC  --  30.7*   RDW  --  15.9*   PLT  --  389      Last Comprehensive Metabolic Panel:  Sodium   Date Value Ref Range Status   2024 140 135 - 145 mmol/L Final   2021 139 133 - 144 mmol/L Final     Potassium   Date Value Ref Range Status   2024 3.9 3.4 -  5.3 mmol/L Final   08/27/2022 4.6 3.5 - 5.0 mmol/L Final   04/29/2021 4.7 3.4 - 5.3 mmol/L Final     Chloride   Date Value Ref Range Status   08/12/2024 104 98 - 107 mmol/L Final   08/27/2022 108 (H) 98 - 107 mmol/L Final   04/29/2021 107 94 - 109 mmol/L Final     Carbon Dioxide   Date Value Ref Range Status   04/29/2021 27 20 - 32 mmol/L Final     Carbon Dioxide (CO2)   Date Value Ref Range Status   08/12/2024 29 22 - 29 mmol/L Final   08/27/2022 22 22 - 31 mmol/L Final     Anion Gap   Date Value Ref Range Status   08/12/2024 7 7 - 15 mmol/L Final   08/27/2022 8 5 - 18 mmol/L Final   04/29/2021 5 3 - 14 mmol/L Final     Glucose   Date Value Ref Range Status   08/12/2024 151 (H) 70 - 99 mg/dL Final   08/27/2022 131 (H) 70 - 125 mg/dL Final   04/29/2021 212 (H) 70 - 99 mg/dL Final     GLUCOSE BY METER POCT   Date Value Ref Range Status   08/12/2024 129 (H) 70 - 99 mg/dL Final     Urea Nitrogen   Date Value Ref Range Status   08/12/2024 18.3 8.0 - 23.0 mg/dL Final   08/27/2022 16 8 - 28 mg/dL Final   04/29/2021 27 7 - 30 mg/dL Final     Creatinine   Date Value Ref Range Status   08/12/2024 1.06 0.67 - 1.17 mg/dL Final   04/29/2021 1.19 0.66 - 1.25 mg/dL Final     GFR Estimate   Date Value Ref Range Status   08/12/2024 71 >60 mL/min/1.73m2 Final     Comment:     eGFR calculated using 2021 CKD-EPI equation.   04/29/2021 59 (L) >60 mL/min/[1.73_m2] Final     Comment:     Non  GFR Calc  Starting 12/18/2018, serum creatinine based estimated GFR (eGFR) will be   calculated using the Chronic Kidney Disease Epidemiology Collaboration   (CKD-EPI) equation.       GFR, ESTIMATED POCT   Date Value Ref Range Status   12/03/2021 15 (L) >60 mL/min/1.73m2 Final     Calcium   Date Value Ref Range Status   08/12/2024 8.8 8.8 - 10.4 mg/dL Final     Comment:     Reference intervals for this test were updated on 7/16/2024 to reflect our healthy population more accurately. There may be differences in the flagging of prior  results with similar values performed with this method. Those prior results can be interpreted in the context of the updated reference intervals.   04/29/2021 9.3 8.5 - 10.1 mg/dL Final        MICROBIOLOGY DATA:  Personally reviewed.  7-Day Micro Results       Collected Updated Procedure Result Status      08/09/2024 1231 08/09/2024 1403 Cell count with differential fluid [[id number]]    (Abnormal)   Synovial fluid from Shoulder, Right    Final result Component Value   No component results            08/09/2024 1231 08/11/2024 1806 Anaerobic Bacterial Culture Routine [[id number]]   Synovial fluid from Shoulder, Right    Preliminary result Component Value   Culture No anaerobic organisms isolated after 2 days  [P]                08/09/2024 1231 08/09/2024 1403 Cell Count Body Fluid [[id number]]    (Abnormal)   Synovial fluid from Shoulder, Right    Final result Component Value Units   Color Yellow    Clarity Hazy    Cell Count Fluid Source Knee, Right    Total Nucleated Cells 2,851 /uL            08/09/2024 1231 08/09/2024 1403 Differential Body Fluid [[id number]]    Synovial fluid from Shoulder, Right    Final result Component Value Units   % Neutrophils 80 %   % Lymphocytes 6 %   % Monocyte/Macrophages 13 %   % Lining Cells 1 %            08/09/2024 1231 08/09/2024 1417 Crystal ID Synovial Fluid [[id number]]   Synovial fluid from Shoulder, Right    Final result Component Value   Crystals Analysis No clinically significant crystals seen.            08/09/2024 1230 08/12/2024 0733 Synovial fluid Aerobic Bacterial Culture Routine With Gram Stain [[id number]]   Synovial fluid from Shoulder, Right    Preliminary result Component Value   Culture No growth after 2 days  [P]    Gram Stain Result No organisms seen  [P]     3+ WBC seen  [P]     Predominantly PMNs               08/07/2024 1848 08/11/2024 2216 Anaerobic Bacterial Culture Routine [[id number]]   Tissue from Thumb, Right    Preliminary result  Component Value   Culture No anaerobic organisms isolated after 4 days  [P]                08/07/2024 1848 08/07/2024 2236 Gram Stain [[id number]]   Tissue from Thumb, Right    Final result Component Value   GS Culture See corresponding culture for results   Gram Stain Result No organisms seen   Gram Stain Result 1+ WBC seen            08/07/2024 1848 08/12/2024 0725 Tissue Aerobic Bacterial Culture Routine [[id number]]   Tissue from Thumb, Right    Final result Component Value   Culture No Growth               08/07/2024 1835 08/11/2024 2231 Anaerobic Bacterial Culture Routine [[id number]]   Tissue from Clavicle, Right    Preliminary result Component Value   Culture No anaerobic organisms isolated after 4 days  [P]                08/07/2024 1835 08/07/2024 2242 Gram Stain [[id number]]   Tissue from Clavicle, Right    Final result Component Value   GS Culture See corresponding culture for results   Gram Stain Result No organisms seen   Gram Stain Result 3+ WBC seen   Predominantly PMNs            08/07/2024 1835 08/12/2024 0725 Tissue Aerobic Bacterial Culture Routine [[id number]]   Tissue from Clavicle, Right    Final result Component Value   Culture No Growth               08/07/2024 1828 08/11/2024 2201 Anaerobic Bacterial Culture Routine [[id number]]   Wound from Elbow, Right    Preliminary result Component Value   Culture No anaerobic organisms isolated after 4 days  [P]                08/07/2024 1828 08/07/2024 2223 Gram Stain [[id number]]   Wound from Elbow, Right    Final result Component Value   GS Culture See corresponding culture for results   Gram Stain Result No organisms seen   Gram Stain Result 1+ WBC seen            08/07/2024 1828 08/09/2024 1000 Wound Aerobic Bacterial Culture Routine [[id number]]   Wound from Elbow, Right    Final result Component Value   Culture No Growth               08/07/2024 1824 08/11/2024 2216 Anaerobic Bacterial Culture Routine [[id number]]   Wound from  Elbow, Right    Preliminary result Component Value   Culture No anaerobic organisms isolated after 4 days  [P]                08/07/2024 1824 08/07/2024 2225 Gram Stain [[id number]]   Wound from Elbow, Right    Final result Component Value   GS Culture See corresponding culture for results   Gram Stain Result No organisms seen   Gram Stain Result 1+ WBC seen            08/07/2024 1824 08/09/2024 1000 Wound Aerobic Bacterial Culture Routine [[id number]]   Wound from Elbow, Right    Final result Component Value   Culture No Growth               08/07/2024 1823 08/07/2024 2014 Crystal ID Synovial Fluid [[id number]]   (Abnormal)   Synovial fluid from Elbow, Right    Final result Component Value   Crystals Analysis Positive for extracellular crystals, consistent with monosodium urate crystals.            08/07/2024 1823 08/07/2024 2028 Cell count with differential fluid [[id number]]    (Abnormal)   Synovial fluid from Elbow, Right    Final result Component Value   No component results            08/07/2024 1823 08/07/2024 2025 Cell Count Body Fluid [[id number]]    (Abnormal)   Synovial fluid from Elbow, Right    Final result Component Value   Color Red   Clarity Turbid   Cell Count Fluid Source Elbow, Right   Right Olecranon Bursa Fluid #2   Total Nucleated Cells --   Specimen clotted, result unavailable.            08/07/2024 1823 08/07/2024 2028 Differential Body Fluid [[id number]]    Synovial fluid from Elbow, Right    Final result Component Value   % Neutrophils --   % Lymphocytes --   % Monocyte/Macrophages --            08/07/2024 1820 08/11/2024 2216 Anaerobic Bacterial Culture Routine [[id number]]   Tissue from Elbow, Right    Preliminary result Component Value   Culture No anaerobic organisms isolated after 4 days  [P]                08/07/2024 1820 08/07/2024 2239 Gram Stain [[id number]]   Tissue from Elbow, Right    Final result Component Value   GS Culture See corresponding culture for results    Gram Stain Result No organisms seen   Gram Stain Result 1+ WBC seen            08/07/2024 1820 08/12/2024 0725 Tissue Aerobic Bacterial Culture Routine [[id number]]   Tissue from Elbow, Right    Final result Component Value   Culture No Growth               08/07/2024 1818 08/11/2024 2216 Anaerobic Bacterial Culture Routine [[id number]]   Wound from Elbow, Right    Preliminary result Component Value   Culture No anaerobic organisms isolated after 4 days  [P]                08/07/2024 1818 08/07/2024 2234 Gram Stain [[id number]]   Wound from Elbow, Right    Final result Component Value   GS Culture See corresponding culture for results   Gram Stain Result No organisms seen   Gram Stain Result 1+ WBC seen            08/07/2024 1818 08/09/2024 1000 Wound Aerobic Bacterial Culture Routine [[id number]]   Wound from Elbow, Right    Final result Component Value   Culture No Growth               08/07/2024 1815 08/07/2024 2130 Cell count with differential fluid [[id number]]    (Abnormal)   Synovial fluid from Elbow, Right    Final result Component Value   No component results            08/07/2024 1815 08/07/2024 1935 Crystal ID Synovial Fluid [[id number]]   (Abnormal)   Synovial fluid from Elbow, Right    Final result Component Value   Crystals Analysis Positive for intracellular crystals, consistent with monosodium urate crystals.            08/07/2024 1815 08/07/2024 2129 Cell Count Body Fluid [[id number]]    (Abnormal)   Synovial fluid from Elbow, Right    Final result Component Value   Color Red   Clarity Cloudy   Cell Count Fluid Source Elbow, Right   Total Nucleated Cells --   Specimen Clotted- Cell count not performed.            08/07/2024 1815 08/07/2024 2130 Differential Body Fluid [[id number]]    Synovial fluid from Elbow, Right    Final result Component Value   No component results                     RADIOLOGY:  Personally Reviewed.  No results found for this or any previous visit (from the  past 24 hour(s)).    Active Problems:    Acute pain of right shoulder    Fever, unspecified fever cause

## 2024-08-12 NOTE — PLAN OF CARE
Goal Outcome Evaluation:    Problem: Adult Inpatient Plan of Care  Goal: Plan of Care Review  Outcome: Progressing     Patient vital signs are at baseline: Yes  Patient able to ambulate as they were prior to admission or with assist devices provided by therapies during their stay:  No,  Reason:  Assist of 2 to sit at edge of bed  Patient MUST void prior to discharge:  Yes  Patient able to tolerate oral intake:  Yes  Pain has adequate pain control using Oral analgesics:  Yes, gave Toradol x1 this shift but patient reported no improvement; oxycodone x1 this shift, lidocaine patch applied to knee, patient declined lidocaine patch on back this evening  Does patient have an identified :  Yes, family visiting at bedside this evening  Has goal D/C date and time been discussed with patient:  Yes

## 2024-08-12 NOTE — PLAN OF CARE
Problem: Pain Acute  Goal: Optimal Pain Control and Function  Intervention: Prevent or Manage Pain  Recent Flowsheet Documentation  Taken 8/11/2024 2000 by Antoinette Ferreira, RN  Medication Review/Management: medications reviewed     Problem: Risk for Delirium  Goal: Improved Attention and Thought Clarity  Intervention: Maximize Cognitive Function  Recent Flowsheet Documentation  Taken 8/11/2024 2000 by Antoinette Ferreira, RN  Reorientation Measures:   calendar in view   clock in view     A/Ox4, using call light appropriately. Reports minimal pain throughout shift, appears comfortable resting in bed. VSS on RA overnight. Not OOB, repositioning done and shifting weight in bed. Sling in place, dressings are CDI. Baseline numbness in BLE, CMS intact otherwise.

## 2024-08-12 NOTE — PLAN OF CARE
Patient vital signs are at baseline: Yes  Patient able to ambulate as they were prior to admission or with assist devices provided by therapies during their stay:  No,  Reason:  Pt not OOB, refused all activities including when offered dangle legs at EOB.  Patient MUST void prior to discharge:  Yes  Patient able to tolerate oral intake:  Yes  Pain has adequate pain control using Oral analgesics:  Yes  Does patient have an identified :  Yes  Has goal D/C date and time been discussed with patient:  Yes

## 2024-08-12 NOTE — PROGRESS NOTES
[facility] Orthopaedics Progress Note      Post-operative Day:  5 Day Post-Op    Procedure(s):  IRRIGATION AND DEBRIDEMENT, SHOULDER  EXCISION, CLAVICLE, DISTAL  IRRIGATION AND DEBRIDEMENT, ELBOW AND THUMB      Subjective:  Lance is in bed resting when entering. He states his pain is managed. He states he is having a lot of nerve pain in his lumbar spine just below where his infection in June was found.   Pain: minimal in thumb and elbow. Shoulder is minimally painful . Right knee moderate.  Chest pain, SOB:  No      Objective:  Blood pressure (!) 174/82, pulse 80, temperature 97.4  F (36.3  C), temperature source Oral, resp. rate 16, height 1.829 m (6'), weight 86.6 kg (191 lb), SpO2 94%.    Patient Vitals for the past 24 hrs:   BP Temp Temp src Pulse Resp SpO2   08/12/24 0753 (!) 174/82 97.4  F (36.3  C) Oral 80 16 94 %   08/11/24 2358 (!) 170/83 98.4  F (36.9  C) Oral 87 16 95 %   08/11/24 1541 136/65 98.7  F (37.1  C) Oral -- 16 92 %       Wt Readings from Last 4 Encounters:   08/04/24 86.6 kg (191 lb)   07/29/24 86.2 kg (190 lb)   07/25/24 87.1 kg (192 lb)   07/19/24 86.6 kg (191 lb)         Motor function, sensation, and circulation of bilateral lower extremities intact   Yes  Wound status: incisions are clean dry and intact. Elbow,MCP and shoulder dressings intact. No drainage. Knee s/p aspiration no erythema or drainage from site.  Post op dressing intact. Yes  Calf tenderness: Bilateral  No    Pertinent Labs   Lab Results: personally reviewed.     Recent Labs   Lab Test 08/07/24  0645 08/05/24  0640 08/04/24  2124 08/01/24  0835 06/20/24  0829 06/19/24  1316 04/26/24  0852 04/26/24  0430 08/26/22  0942 08/25/22  1746 06/16/22  0934 12/25/21  2000 07/29/21  0657 07/28/21  0337 04/29/21  1631 07/28/20  0626   INR  --   --   --   --   --  1.04  --  1.11  --   --   --  1.17*  --   --   --   --    WBC 9.0  --  11.8* 6.4   < >  --    < > 11.4*   < > 12.2*   < > 18.6*   < > 8.9   < > 7.4   HGB  9.2*  --  11.1* 11.7*   < >  --    < > 11.9*   < > 10.9*   < > 12.7*   < > 12.1*   < > 10.7*   HCT 29.2*  --  35.4* 39.9*   < >  --    < > 35.5*   < > 34.0*   < > 40.0   < > 38.1*   < > 35.2*   MCV 86  --  87 94   < >  --    < > 87   < > 89   < > 91   < > 97   < > 92     --  360 325   < >  --    < > 253   < > 441   < > 379   < > 264   < > 219    139 139  --    < >  --    < > 138   < > 138   < > 136   < > 139   < > 137   CRP  --   --   --   --   --   --   --   --   --  4.7*  --   --   --  84.2*  --  48.3*    < > = values in this interval not displayed.       Plan: Anticoagulation protocol: ASA 81 mg daily              Pain medications: oxycodone, dilaudid, tylenol, and Robaxin            Weight bearing status:  femi AGUILA for ROM as tolerated, sling PRN                       Disposition:  Based on no growth on cultures, we continue to monitor knee.  Shoulder,elbow and MCP healing well with no concerns at this time. For concerns about lumbar spine pain and right knee pain, concern for post infection residual nerve pain. For cervical and lumbar spine will default to hospital team and ID to rule out new infection however if concluded no infection present can have spine team access. Consider TCU placement.               Continue cares and rehabilitation     Report completed by:  Marcio Magallon PA-C  Date: 8/12/2024  Time: 10:06 AM

## 2024-08-12 NOTE — PLAN OF CARE
Goal Outcome Evaluation:  Problem: Adult Inpatient Plan of Care  Goal: Absence of Hospital-Acquired Illness or Injury  Intervention: Prevent Skin Injury  Problem: Adult Inpatient Plan of Care  Goal: Optimal Comfort and Wellbeing  Intervention: Monitor Pain and Promote Comfort  Patient vital signs are at baseline: Yes, except elevated BP (scheduled PO BP meds administered) on RA  Patient able to ambulate as they were prior to admission or with assist devices provided by therapies during their stay:  No,  Reason:  Refused activities  Patient MUST void prior to discharge:  Yes, urinal within reach.   Patient able to tolerate oral intake:  Yes  Pain has adequate pain control using Oral analgesics:  Yes, rated moderate pain in right knee; offered PRN PO pain meds, but declined.   Does patient have an identified :  Yes  Has goal D/C date and time been discussed with patient:  No    A&Ox4. CMS intact. IV saline locked. Sling in place on the right arm. Call light within reach and called appropriately.

## 2024-08-13 ENCOUNTER — APPOINTMENT (OUTPATIENT)
Dept: OCCUPATIONAL THERAPY | Facility: CLINIC | Age: 79
DRG: 464 | End: 2024-08-13
Payer: COMMERCIAL

## 2024-08-13 ENCOUNTER — APPOINTMENT (OUTPATIENT)
Dept: PHYSICAL THERAPY | Facility: CLINIC | Age: 79
DRG: 464 | End: 2024-08-13
Payer: COMMERCIAL

## 2024-08-13 LAB
CRP SERPL-MCNC: 53.2 MG/L
GLUCOSE BLDC GLUCOMTR-MCNC: 140 MG/DL (ref 70–99)
GLUCOSE BLDC GLUCOMTR-MCNC: 146 MG/DL (ref 70–99)
GLUCOSE BLDC GLUCOMTR-MCNC: 165 MG/DL (ref 70–99)
GLUCOSE BLDC GLUCOMTR-MCNC: 209 MG/DL (ref 70–99)
GLUCOSE BLDC GLUCOMTR-MCNC: 224 MG/DL (ref 70–99)

## 2024-08-13 PROCEDURE — 250N000013 HC RX MED GY IP 250 OP 250 PS 637: Performed by: STUDENT IN AN ORGANIZED HEALTH CARE EDUCATION/TRAINING PROGRAM

## 2024-08-13 PROCEDURE — 36415 COLL VENOUS BLD VENIPUNCTURE: CPT | Performed by: STUDENT IN AN ORGANIZED HEALTH CARE EDUCATION/TRAINING PROGRAM

## 2024-08-13 PROCEDURE — 97530 THERAPEUTIC ACTIVITIES: CPT | Mod: GP

## 2024-08-13 PROCEDURE — 250N000013 HC RX MED GY IP 250 OP 250 PS 637: Performed by: EMERGENCY MEDICINE

## 2024-08-13 PROCEDURE — 99232 SBSQ HOSP IP/OBS MODERATE 35: CPT | Performed by: STUDENT IN AN ORGANIZED HEALTH CARE EDUCATION/TRAINING PROGRAM

## 2024-08-13 PROCEDURE — 250N000011 HC RX IP 250 OP 636: Performed by: STUDENT IN AN ORGANIZED HEALTH CARE EDUCATION/TRAINING PROGRAM

## 2024-08-13 PROCEDURE — 97110 THERAPEUTIC EXERCISES: CPT | Mod: GP

## 2024-08-13 PROCEDURE — G0463 HOSPITAL OUTPT CLINIC VISIT: HCPCS

## 2024-08-13 PROCEDURE — 120N000001 HC R&B MED SURG/OB

## 2024-08-13 PROCEDURE — 86140 C-REACTIVE PROTEIN: CPT | Performed by: STUDENT IN AN ORGANIZED HEALTH CARE EDUCATION/TRAINING PROGRAM

## 2024-08-13 PROCEDURE — 97535 SELF CARE MNGMENT TRAINING: CPT | Mod: GO | Performed by: OCCUPATIONAL THERAPIST

## 2024-08-13 RX ADMIN — SODIUM BICARBONATE 1950 MG: 650 TABLET ORAL at 14:00

## 2024-08-13 RX ADMIN — GABAPENTIN 300 MG: 300 CAPSULE ORAL at 19:37

## 2024-08-13 RX ADMIN — ENOXAPARIN SODIUM 40 MG: 100 INJECTION SUBCUTANEOUS at 12:07

## 2024-08-13 RX ADMIN — COLCHICINE 0.6 MG: 0.6 TABLET ORAL at 09:09

## 2024-08-13 RX ADMIN — COLCHICINE 0.6 MG: 0.6 TABLET ORAL at 21:33

## 2024-08-13 RX ADMIN — GABAPENTIN 300 MG: 300 CAPSULE ORAL at 14:00

## 2024-08-13 RX ADMIN — GABAPENTIN 300 MG: 300 CAPSULE ORAL at 09:09

## 2024-08-13 RX ADMIN — SODIUM BICARBONATE 1950 MG: 650 TABLET ORAL at 19:37

## 2024-08-13 RX ADMIN — SODIUM BICARBONATE 1950 MG: 650 TABLET ORAL at 09:09

## 2024-08-13 RX ADMIN — TORSEMIDE 10 MG: 5 TABLET ORAL at 09:10

## 2024-08-13 RX ADMIN — INSULIN ASPART 1 UNITS: 100 INJECTION, SOLUTION INTRAVENOUS; SUBCUTANEOUS at 12:07

## 2024-08-13 RX ADMIN — INSULIN ASPART 2 UNITS: 100 INJECTION, SOLUTION INTRAVENOUS; SUBCUTANEOUS at 17:42

## 2024-08-13 RX ADMIN — Medication 2 CAPSULE: at 09:09

## 2024-08-13 RX ADMIN — INSULIN GLARGINE 4 UNITS: 100 INJECTION, SOLUTION SUBCUTANEOUS at 09:10

## 2024-08-13 RX ADMIN — LISINOPRIL 10 MG: 10 TABLET ORAL at 14:00

## 2024-08-13 RX ADMIN — FINASTERIDE 5 MG: 5 TABLET, FILM COATED ORAL at 09:09

## 2024-08-13 RX ADMIN — ASPIRIN 81 MG: 81 TABLET, COATED ORAL at 09:10

## 2024-08-13 ASSESSMENT — ACTIVITIES OF DAILY LIVING (ADL)
ADLS_ACUITY_SCORE: 59
ADLS_ACUITY_SCORE: 60
ADLS_ACUITY_SCORE: 61
ADLS_ACUITY_SCORE: 60
ADLS_ACUITY_SCORE: 57
ADLS_ACUITY_SCORE: 60
ADLS_ACUITY_SCORE: 53
ADLS_ACUITY_SCORE: 60
ADLS_ACUITY_SCORE: 59
ADLS_ACUITY_SCORE: 59
ADLS_ACUITY_SCORE: 53
ADLS_ACUITY_SCORE: 60
ADLS_ACUITY_SCORE: 61
ADLS_ACUITY_SCORE: 57
ADLS_ACUITY_SCORE: 59
ADLS_ACUITY_SCORE: 61
ADLS_ACUITY_SCORE: 61
ADLS_ACUITY_SCORE: 59
ADLS_ACUITY_SCORE: 53
ADLS_ACUITY_SCORE: 61
ADLS_ACUITY_SCORE: 59
ADLS_ACUITY_SCORE: 59
ADLS_ACUITY_SCORE: 53

## 2024-08-13 NOTE — PROGRESS NOTES
[facility]    Medicine Progress Note - Hospitalist Service    Date of Admission:  8/4/2024    Assessment & Plan   Updated summary:   Mr. Ibrahim is an 79 years old man with past medical history significant for HFpEF, diabetes, gout recent hospitalization 5/2024 for urosepsis and bacteremia due to Klebsiella. Eventually diagnosed with L4-L5 discitis and 6/19.  Was placed on IV antibiotic with vancomycin and ertapenem. He presented to the hospital on 8/4/2024 with acute right shoulder pain which progressed to involve the right elbow, right thumb.  Working diagnosis is gout attack versus septic arthritis. MRI of the right shoulder showed signs of right acromioclavicular inflammation with subdeltoid bursitis, possible early osteomyelitis. Evaluated by orthopedic surgery. Plans were for I&D of the right shoulder and right elbow on 8/7.  Of pertinence, CT and MRI 7/26 demonstrated resolution of previously noted intramural abscess; infectious disease following.    Status post Irrigation and debridement of the right AC joint, right olecranon bursa, right elbow joint, right thumb MCP joint, Open distal clavicle excision, right, on 8/7/24, with EBL of 50ccs by Dr. Hanson. Also status post ultrasound with aspiration of right knee.  Currently treating both potential septic joint and gout with, respectively, IV antibiotics and colchicine daily.  MRI Cervical-spine 8/10 without any no acute findings, though spondylosis and stenosis are noted.  New provider on 8/11, patient feels about the same and no new symptoms or concerns.  Following cultures.  Anticoagulation plan is aspirin twice daily for 42 days.  Position is eventually for TCU.  Discussed with/updated social work. Pain spikes and adding on two prn doses of toradol. Continue lidocaine cream. Also increased colchicine to BID.    Vancomycin was discontinued on 8/12, plan to observe off of IV antibiotics, if the pain improves over the next day,  "consider possible steroid .  TTE obtained, nonremarkable.  Orthopedics recommending spine team assess for the cervical/lumbar pain, consulted on 8/12 and concurred with current conservative plan and no surgical intervention and felt symptoms could \"be related to erosive changes and loss of height at the L4-5 level due to the prior infection there.\"     On 8/13/2024 pain is 'the same,' so writer will HOLD on starting steroids until Infectious Disease clearance (as steroids may hinder healing but they would certainly have anti-inflammatory properties to help w/ gout). Updated sw. Eventually to tcu.    ----------  Polyarticular gout attack  Right shoulder pain  Right thumb pain  A- stable but see summary. Treating w/ iv abx and colchicine, now monitoring off abx. May start steroid but only once ID clears to do so. TTE negative for acute infection.  P:  -Intraoperative cultures noted  -Continue colchicine 0.6 mg increased to BID on 8/12   - consider steroids once, cleared per ID   - eventually after flare-up would start allopurinol   -Continue current pain management  -Recheck CRP 8/13/2024   -Continue vancomycin.    - consider pain team  if refractory      Recent L4-L5 discitis/osteomyelitis  Neck stiffness  -Completed 6 weeks course of IV vancomycin and ertapenem on 8/5.  -Lumbar CT on 7/26/2024 showed resolution of epidural abscess.  -8/6, patient reported neck stiffness.  No clear tenderness to palpation.      Plan  -no acute findings on MRI though spondylosis and stenosis noted  - monitor clinically   - Orthopedics recommending spine team assess for the cervical/lumbar pain (?new infection or residual, etc.); they are consulted now on 8/12.     -neurosurgery concurred with current conservative plan and no surgical intervention and felt symptoms could \"be related to erosive changes and loss of height at the L4-5 level due to the prior infection there.\"     Diabetes  -At home on basal insulin 8 units twice daily, " glipizide 2.5 mg daily, semaglutide 40 mg daily  - given sugars are a bit labile, will modify to 6units long-acting BID on 8/13  -Continue to hold glipizide and semaglutide     Acute kidney injury on CKD  -Baseline creatinine around 1.1  -Currently on presentation 1.3  -Creatinine improved back to baseline.  Plan  -No further intervention; monitor     Anemia of chronic inflammation with possible component of iron deficiency anemia  -Iron level low at 17 with iron sat index of 9.  Ferritin within normal limits, however, ferritin is an acute phase reactant  -Hemoglobin dropped from around 11-9.2.  No signs of active bleeding.  Plan  -Continue to monitor hemoglobin.  -Would recommend repeating iron studies as an outpatient.    HFpEF  -At home on torsemide 20 mg daily  -Appears euvolemic of his examination. Stable. On RA.   Plan  -PTA Torsemide  -I/Os and weights     Hypertension  -At home on lisinopril 10 mg daily, torsemide 20 mg daily  -Blood pressures currently around 140/60.  -No indication for strict blood pressure control during this hospitalization.  Plan  -PTA Lisinopril  -pain control           Diet: Regular Diet Adult  Snacks/Supplements Adult: Glucerna; Between Meals    DVT Prophylaxis: Enoxaparin (Lovenox) SQ  Barnes Catheter: Not present  Lines: None       Cardiac Monitoring: None  Code Status: Full Code      Clinically Significant Risk Factors              # Hypoalbuminemia: Lowest albumin = 3 g/dL at 8/4/2024  9:24 PM, will monitor as appropriate     # Hypertension: Noted on problem list    # Chronic heart failure with preserved ejection fraction: heart failure noted on problem list and last echo with EF >50%          # DMII: A1C = 8.0 % (Ref range: <5.7 %) within past 6 months   # Overweight: Estimated body mass index is 25.9 kg/m  as calculated from the following:    Height as of this encounter: 1.829 m (6').    Weight as of this encounter: 86.6 kg (191 lb).        # Financial/Environmental Concerns:  "none         Disposition Plan     Medically Ready for Discharge: Anticipated in 2-4 Days          Luisito Flanagan MD  Hospitalist Service  [facility]  Securely message with [employer] (more info)  Text page via [employer] Paging/Directory   ______________________________________________________________________    Interval History   NAEO.   Discussed care plans and possible steroid if pain is better  Pt reports adamantly that \"pain is exactly the same\" and it \"has not gotten better\" at all since stopping abx; as such discussed would not start steroids until cleared by ID given risk for healing-impairment from steroids  Otherwise he has no new symptoms or concerns  Discussed w/ sw        Physical Exam   Vital Signs: Temp: 97.8  F (36.6  C) Temp src: Oral BP: (!) 159/75 Pulse: 81   Resp: 16 SpO2: 97 % O2 Device: None (Room air)    Weight: 191 lbs 0 oz    Gen:Well appearing, well nourished, in no acute distress   HEENT: NC/AT, MMM, JOLIE, EOMI, Supple, no TTP of C-spine  CV: RRR, normal s1, normal s2, no m/r/g  Resp: CTAB, normal I/E effort, no additional respiratory sounds  Abd: +BS, non-tender, non-distended, no guarding or rebound tenderness  Ext: stable Sling, bandage/splint right arm/wrist, all dressings C/D/I. Otherwise No significant deformities or trauma, moving all ext freely  Skin: No erythema, no lesions or rashes.   Neuro:No focal neurologic deficit, AxOx4. Strength and sensation grossly intact  Psych: Pleasant, answering questions appropriately, normal mood/affect. Insight good, judgement intact.       Medical Decision Making       45 MINUTES SPENT BY ME on the date of service doing chart review, history, exam, documentation & further activities per the note.      Data         Imaging results reviewed over the past 24 hrs:   Recent Results (from the past 24 hour(s))   Echocardiogram Complete   Result Value    LVEF  55-60%    Narrative    " [id number]  [id number]  [id number]  [id number]^JESSICA^[place] [address]     Name: CURTIS SAHU  MRN: [id number]  : 1945  Study Date: 2024 01:22 PM  Age: 79 yrs  Gender: Male  Patient Location: [facility]  Reason For Study: Endocarditis  Ordering Physician: SHENG LOPEZ  Performed By: SIRIA     BSA: 2.1 m2  Height: 72 in  Weight: 191 lb  HR: 80  BP: 173/80 mmHg  ______________________________________________________________________________  Procedure  Complete Echo Adult. Definity (NDC #69943-924) given intravenously. Adequate  quality two-dimensional was performed and interpreted. Compared to prior  study, there is no significant change.  ______________________________________________________________________________  Interpretation Summary     The left ventricle is normal in size. There is mild concentric left  ventricular hypertrophy.  Left ventricular systolic function is normal. The visual ejection fraction is  55-60%. No regional wall motion abnormalities noted.  Grade II or moderate diastolic dysfunction.     The right ventricle is mild to moderately dilated. The right ventricular  systolic function is normal.  There is mild (1+) aortic regurgitation.  There is sclerosis, calcification, and restriction of the aortic valve opening  compatible with mild aortic stenosis.  Right ventricle systolic pressure estimate normal  IVC diameter <2.1 cm collapsing >50% with sniff suggests a normal RA pressure  of 3 mmHg.  No evidence for infective endocarditis within the limits of this  study/modality.  Compared to prior study, there is no significant change.  ______________________________________________________________________________  Left Ventricle  The left ventricle is normal in size. There is mild concentric left  ventricular hypertrophy. Left ventricular systolic function is normal. The  visual ejection fraction is 55-60%. Grade II or moderate  diastolic  dysfunction. No regional wall motion abnormalities noted.     Right Ventricle  The right ventricle is mild to moderately dilated. The right ventricular  systolic function is normal.     Atria  Normal left atrial size. Right atrial size is normal.     Mitral Valve  Mitral valve leaflets appear normal. There is trace mitral regurgitation.  There is no mitral valve stenosis.     Tricuspid Valve  Tricuspid valve leaflets appear normal. There is trace tricuspid  regurgitation. Right ventricle systolic pressure estimate normal.     Aortic Valve  The aortic valve is trileaflet. Moderate aortic valve calcification is  present. There is mild (1+) aortic regurgitation. There is sclerosis,  calcification, and restriction of the aortic valve opening compatible with  mild aortic stenosis.     Pulmonic Valve  The pulmonic valve is not well visualized. There is trace pulmonic valvular  regurgitation.     Vessels  The aorta root is normal. IVC diameter <2.1 cm collapsing >50% with sniff  suggests a normal RA pressure of 3 mmHg.     Pericardium  There is no pericardial effusion.     Rhythm  Sinus rhythm was noted.  ______________________________________________________________________________  MMode/2D Measurements & Calculations     IVSd: 1.2 cm  LVIDd: 4.7 cm  LVIDs: 3.7 cm  LVPWd: 1.4 cm  FS: 21.0 %  LV mass(C)d: 232.9 grams  LV mass(C)dI: 111.5 grams/m2  Ao root diam: 3.6 cm  asc Aorta Diam: 3.5 cm  LVOT diam: 2.3 cm  LVOT area: 4.0 cm2  Ao root diam index Ht(cm/m): 1.9  Ao root diam index BSA (cm/m2): 1.7  Asc Ao diam index BSA (cm/m2): 1.7  Asc Ao diam index Ht(cm/m): 1.9  EF Biplane: 43.6 %     LA Volume Indexed (AL/bp): 34.6 ml/m2  RV Base: 4.8 cm  RWT: 0.60  TAPSE: 2.1 cm     Time Measurements  MM HR: 80.0 BPM     Doppler Measurements & Calculations  MV E max anthony: 83.7 cm/sec  MV A max anthony: 94.6 cm/sec  MV E/A: 0.88  MV max PG: 3.3 mmHg  MV mean P.0 mmHg  MV V2 VTI: 22.0 cm  MVA(VTI): 5.0 cm2  Ao V2 max:  224.7 cm/sec  Ao max P.0 mmHg  Ao V2 mean: 150.5 cm/sec  Ao mean PG: 10.7 mmHg  Ao V2 VTI: 45.3 cm  SHILO(I,D): 2.4 cm2  SHILO(V,D): 2.3 cm2  AI P1/2t: 402.2 msec  LV V1 max P.5 mmHg  LV V1 max: 127.5 cm/sec  LV V1 VTI: 27.4 cm     SV(LVOT): 109.8 ml  SI(LVOT): 52.6 ml/m2  PA V2 max: 108.6 cm/sec  PA max P.7 mmHg  PA acc time: 0.08 sec  TR max edward: 278.3 cm/sec  TR max P.0 mmHg  AV Edward Ratio (DI): 0.57  SHILO Index (cm2/m2): 1.2  E/E': 19.9  E/E' av.1  Lateral E/e': 12.4  Medial E/e': 19.7  Peak E' Edward: 4.2 cm/sec  RV S Edward: 15.7 cm/sec     ______________________________________________________________________________  Report approved by: Lisbet Mandujano 2024 03:35 PM

## 2024-08-13 NOTE — PROGRESS NOTES
Care Management Follow Up    Length of Stay (days): 8    Expected Discharge Date: 08/14/2024     Concerns to be Addressed: discharge planning     Patient plan of care discussed at interdisciplinary rounds: Yes    Anticipated Discharge Disposition: Skilled Nursing Facility (Back to [facility])     Anticipated Discharge Services: Transportation Services  Anticipated Discharge DME: None    Patient/family educated on Medicare website which has current facility and service quality ratings:  (N/A, pt has a bed hold at TCU)  Education Provided on the Discharge Plan: Yes  Patient/Family in Agreement with the Plan: yes    Referrals Placed by CM/SW:    Private pay costs discussed:  TCU bed hold    Additional Information:  Received a message from [facility] asking if pt would like to give up his bed hold since he is currently being charged $624/day for the hold. [facility] agreed to take pt back when he is medically ready to go. Spoke with pt, he agreed to give up the bed at this time and would like to go back to [facility] when he is medically ready.     [facility] was notified.     Keep [facility] updated on when he will be medically ready to discharge.     Lucas Courtney RN

## 2024-08-13 NOTE — PROGRESS NOTES
CLINICAL NUTRITION SERVICES - REASSESSMENT NOTE     Nutrition Prescription    RECOMMENDATIONS FOR MDs/PROVIDERS TO ORDER:  none    Malnutrition Status:    Does not meet criteria    Recommendations already ordered by Registered Dietitian (RD):  Decrease Glucerna to daily     Future/Additional Recommendations:  Will monitor progress towards goals     EVALUATION OF THE PROGRESS TOWARD GOALS   Diet: Regular with Glucerna bid   Intake: Pt ordered 3,3220kcal and 90g protein which includes supplements, intake documented at 100%, meeting > 100% of needs      NEW FINDINGS   He has a few Glucerna in room. He would like to have them decreased to once daily     ANTHROPOMETRICS  No new weights       PHYSICAL FINDINGS  Per flowsheet:   Edema- +3 right hand, +1-+2 BLE  GI- fecal incontinence   Skin- surgical site on right thumb, elbow and shoulder     LABS  Reviewed meter glucose-146, 140 (improving)    MEDICATIONS  Reviewed Novolog, Culturell    MALNUTRITION:  % Weight Loss:  Weight loss does not meet criteria for malnutrition 5%  % Intake:  No decreased intake noted  Subcutaneous Fat Loss:  None observed  Muscle Loss:  None observed  Fluid Retention:  Moderate      Malnutrition Diagnosis: Patient does not meet two of the above criteria necessary for diagnosing malnutrition      NUTRITION DIAGNOSIS  Unintended weight loss related to multiple hospitalizations/rehab/illnesses x 5 months inadequate intake  as evidenced by recent hx of   -evaluation: progressing, no new wt     Previous Goals  Maintain weight - no new wt  Meet > 75% of estimated nutrition needs -met     Goals  Maintain weight  Meet > 75% of estimated nutrition needs    INTERVENTIONS  Implementation  Decrease Glucerna to daily, pt eating better and not keeping up with Glucerna      Monitoring/Evaluation  Progress toward goals will be monitored and evaluated per protocol.

## 2024-08-13 NOTE — PLAN OF CARE
Goal Outcome Evaluation:  Problem: Adult Inpatient Plan of Care  Goal: Absence of Hospital-Acquired Illness or Injury  Intervention: Prevent Skin Injury  Problem: Adult Inpatient Plan of Care  Goal: Absence of Hospital-Acquired Illness or Injury  Intervention: Identify and Manage Fall Risk  Intervention: Prevent Skin Injury  Intervention: Prevent and Manage VTE (Venous Thromboembolism) Risk  Patient vital signs are at baseline: Yes, except elevated BP (scheduled PO BP administered), on RA  Patient able to ambulate as they were prior to admission or with assist devices provided by therapies during their stay:  No,  Reason:  Due to right knee pain, pain refused activities  Patient MUST void prior to discharge:  Yes, urinal at bedside, incontinent of bowel and bladder  Patient able to tolerate oral intake:  Yes  Pain has adequate pain control using Oral analgesics:  Yes, denied pain when in bed, moderate pain when moved right LE. PRN PO pain meds offered, but declined. Ice therapy used on RUE and RLE.  Does patient have an identified :  Yes  Has goal D/C date and time been discussed with patient:  No,  Reason:  When medically ready    A&Ox4. CMS intact, except numbing in right thumb.  Since pt refused to get up from bed, turned pt as tolerated, but refused most of the time. Call light within reach and called appropriately. Alarms activated for safety.

## 2024-08-13 NOTE — PROGRESS NOTES
INFECTIOUS DISEASE FOLLOW UP NOTE    Date: 08/13/2024   CHIEF COMPLAINT:   Chief Complaint   Patient presents with    Shoulder Pain    Fever        ASSESSMENT:    Comorbid conditions affecting immune system: Type II Diabetes mellitus   Active Problems:    Acute pain of right shoulder    Fever, unspecified fever cause     Right Shoulder Pain  Concern for right shoulder septic Arthritis  -acute onset  -CRP elevated on admission 142. WBC 11.8 on admission, improved  -possible gout flare; now with right thumb pain, right elbow pain/bursitis  -blood culture negative to date  -mri ordered of shoulder showing AC joint buritis/inflammation.   -seen by ortho, s/p irrigation and debridement of right shoulder, clavicle, elbow, and thumb 8/7/2024, cultures negative to date  -synovial fluid right elbow showing monosodium urate crystals  -Neck MR without infectious process. Spondylosis and stenosis noted    Right knee pain  -History of right TKA  -xray with small effusion  -concern for PJI, s/p aspiration on 8/9. Elevated wbc and PMNs, but culture negative to date. No crystals seen     Recent L4-L5 discitis/osteomyelitis  -s/p aspiration on 6/19 - culture negative  -treated with 6 weeks IV vancomycin and Ertapenem - completed  -Repeat lumbar CT on 7/26/2024 showed resolution of epidural abscess    Summary: Patient with history of gout with hyperuricemia not on allopurinol but on colchicine admitted with multiple joint pain and effusion with confirmed gout in right elbow.  Has prosthetic right knee.  Needs left knee TKA.  Recent history of lumbar discitis with negative cultures status post 6 weeks of IV antibiotic completed on 7/31/2024.  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  If he has infection of the right prosthetic knee, antibiotics alone are not going to be sufficient but I do not have enough evidence to recommend right prosthetic knee surgery.  After long discussion with the patient on  8/12/2024, our conclusion is to stop the IV antibiotic and observe while treating the gout.  If he has signs of infection especially of the right prosthetic knee, then surgical debridement will be needed.     Recommendations:   -Observe off antibiotics.  -Repeat CRP today and tomorrow morning.  -Monitor pain    Celina Schwartz MD  [facility]  Direct messaging: [facility]   On-Call ID provider: [phone], option: 9     ______________________________________________________________________    SUBJECTIVE / INTERVAL HISTORY:  Continue to have right knee pain.  No new positive cultures.    OBJECTIVE:  BP (!) 159/75 (BP Location: Left arm)   Pulse 81   Temp 97.8  F (36.6  C) (Oral)   Resp 16   Ht 1.829 m (6')   Wt 86.6 kg (191 lb)   SpO2 97%   BMI 25.90 kg/m       Resp: 16    GEN: No acute distress.    RESPIRATORY:  Normal breathing pattern. Clear to auscultation bilaterally  CARDIOVASCULAR:  Regular rate and rhythm. No murmur, click, gallop or rub.   ABDOMEN:  Soft, normal bowel sounds, non-tender,   EXTREMITIES: more range of motion in shoulder and elbow. Right knee is warm. Pain with passive movement  SKIN/HAIR/NAILS:  No rashes    Pertinent labs:  CRP Inflammation   Date Value Ref Range Status   07/28/2020 48.3 (H) 0.0 - 8.0 mg/L Final     CRP   Date Value Ref Range Status   08/25/2022 4.7 (H) 0.0 - <0.8 mg/dL Final      CBC RESULTS:   Recent Labs   Lab Test 08/08/24  0618   WBC 8.8   RBC 3.49*   HGB 9.6*   HCT 30.7*   MCV 88   MCH 27.5   MCHC 31.3*   RDW 15.7*         Last Comprehensive Metabolic Panel:  Sodium   Date Value Ref Range Status   08/12/2024 140 135 - 145 mmol/L Final   04/29/2021 139 133 - 144 mmol/L Final     Potassium   Date Value Ref Range Status   08/12/2024 3.9 3.4 - 5.3 mmol/L Final   08/27/2022 4.6 3.5 - 5.0 mmol/L Final   04/29/2021 4.7 3.4 - 5.3 mmol/L Final     Chloride   Date Value Ref Range Status   08/12/2024 104 98 - 107 mmol/L Final    08/27/2022 108 (H) 98 - 107 mmol/L Final   04/29/2021 107 94 - 109 mmol/L Final     Carbon Dioxide   Date Value Ref Range Status   04/29/2021 27 20 - 32 mmol/L Final     Carbon Dioxide (CO2)   Date Value Ref Range Status   08/12/2024 29 22 - 29 mmol/L Final   08/27/2022 22 22 - 31 mmol/L Final     Anion Gap   Date Value Ref Range Status   08/12/2024 7 7 - 15 mmol/L Final   08/27/2022 8 5 - 18 mmol/L Final   04/29/2021 5 3 - 14 mmol/L Final     Glucose   Date Value Ref Range Status   08/27/2022 131 (H) 70 - 125 mg/dL Final   04/29/2021 212 (H) 70 - 99 mg/dL Final     GLUCOSE BY METER POCT   Date Value Ref Range Status   08/13/2024 165 (H) 70 - 99 mg/dL Final     Urea Nitrogen   Date Value Ref Range Status   08/12/2024 18.3 8.0 - 23.0 mg/dL Final   08/27/2022 16 8 - 28 mg/dL Final   04/29/2021 27 7 - 30 mg/dL Final     Creatinine   Date Value Ref Range Status   08/12/2024 1.06 0.67 - 1.17 mg/dL Final   04/29/2021 1.19 0.66 - 1.25 mg/dL Final     GFR Estimate   Date Value Ref Range Status   08/12/2024 71 >60 mL/min/1.73m2 Final     Comment:     eGFR calculated using 2021 CKD-EPI equation.   04/29/2021 59 (L) >60 mL/min/[1.73_m2] Final     Comment:     Non  GFR Calc  Starting 12/18/2018, serum creatinine based estimated GFR (eGFR) will be   calculated using the Chronic Kidney Disease Epidemiology Collaboration   (CKD-EPI) equation.       GFR, ESTIMATED POCT   Date Value Ref Range Status   12/03/2021 15 (L) >60 mL/min/1.73m2 Final     Calcium   Date Value Ref Range Status   08/12/2024 8.8 8.8 - 10.4 mg/dL Final     Comment:     Reference intervals for this test were updated on 7/16/2024 to reflect our healthy population more accurately. There may be differences in the flagging of prior results with similar values performed with this method. Those prior results can be interpreted in the context of the updated reference intervals.   04/29/2021 9.3 8.5 - 10.1 mg/dL Final        MICROBIOLOGY  DATA:  Personally reviewed.  7-Day Micro Results       Collected Updated Procedure Result Status      08/09/2024 1231 08/09/2024 1403 Cell count with differential fluid [[id number]]    (Abnormal)   Synovial fluid from Shoulder, Right    Final result Component Value   No component results            08/09/2024 1231 08/12/2024 1806 Anaerobic Bacterial Culture Routine [[id number]]   Synovial fluid from Shoulder, Right    Preliminary result Component Value   Culture No anaerobic organisms isolated after 3 days  [P]                08/09/2024 1231 08/09/2024 1403 Cell Count Body Fluid [[id number]]    (Abnormal)   Synovial fluid from Shoulder, Right    Final result Component Value Units   Color Yellow    Clarity Hazy    Cell Count Fluid Source Knee, Right    Total Nucleated Cells 2,851 /uL            08/09/2024 1231 08/09/2024 1403 Differential Body Fluid [[id number]]    Synovial fluid from Shoulder, Right    Final result Component Value Units   % Neutrophils 80 %   % Lymphocytes 6 %   % Monocyte/Macrophages 13 %   % Lining Cells 1 %            08/09/2024 1231 08/09/2024 1417 Crystal ID Synovial Fluid [[id number]]   Synovial fluid from Shoulder, Right    Final result Component Value   Crystals Analysis No clinically significant crystals seen.            08/09/2024 1230 08/13/2024 0721 Synovial fluid Aerobic Bacterial Culture Routine With Gram Stain [[id number]]   Synovial fluid from Shoulder, Right    Preliminary result Component Value   Culture No growth after 3 days  [P]    Gram Stain Result No organisms seen  [P]     3+ WBC seen  [P]     Predominantly PMNs               08/07/2024 1848 08/12/2024 2216 Anaerobic Bacterial Culture Routine [[id number]]   Tissue from Thumb, Right    Preliminary result Component Value   Culture No anaerobic organisms isolated after 5 days  [P]                08/07/2024 1848 08/07/2024 2236 Gram Stain [[id number]]   Tissue from Thumb, Right    Final result Component Value   GS  Culture See corresponding culture for results   Gram Stain Result No organisms seen   Gram Stain Result 1+ WBC seen            08/07/2024 1848 08/12/2024 0725 Tissue Aerobic Bacterial Culture Routine [[id number]]   Tissue from Thumb, Right    Final result Component Value   Culture No Growth               08/07/2024 1835 08/12/2024 2231 Anaerobic Bacterial Culture Routine [[id number]]   Tissue from Clavicle, Right    Preliminary result Component Value   Culture No anaerobic organisms isolated after 5 days  [P]                08/07/2024 1835 08/07/2024 2242 Gram Stain [[id number]]   Tissue from Clavicle, Right    Final result Component Value   GS Culture See corresponding culture for results   Gram Stain Result No organisms seen   Gram Stain Result 3+ WBC seen   Predominantly PMNs            08/07/2024 1835 08/12/2024 0725 Tissue Aerobic Bacterial Culture Routine [[id number]]   Tissue from Clavicle, Right    Final result Component Value   Culture No Growth               08/07/2024 1828 08/12/2024 2201 Anaerobic Bacterial Culture Routine [[id number]]   Wound from Elbow, Right    Preliminary result Component Value   Culture No anaerobic organisms isolated after 5 days  [P]                08/07/2024 1828 08/07/2024 2223 Gram Stain [[id number]]   Wound from Elbow, Right    Final result Component Value   GS Culture See corresponding culture for results   Gram Stain Result No organisms seen   Gram Stain Result 1+ WBC seen            08/07/2024 1828 08/09/2024 1000 Wound Aerobic Bacterial Culture Routine [[id number]]   Wound from Elbow, Right    Final result Component Value   Culture No Growth               08/07/2024 1824 08/12/2024 2216 Anaerobic Bacterial Culture Routine [[id number]]   Wound from Elbow, Right    Preliminary result Component Value   Culture No anaerobic organisms isolated after 5 days  [P]                08/07/2024 1824 08/07/2024 2225 Gram Stain [[id number]]   Wound from Elbow, Right     Final result Component Value   GS Culture See corresponding culture for results   Gram Stain Result No organisms seen   Gram Stain Result 1+ WBC seen            08/07/2024 1824 08/09/2024 1000 Wound Aerobic Bacterial Culture Routine [[id number]]   Wound from Elbow, Right    Final result Component Value   Culture No Growth               08/07/2024 1823 08/07/2024 2014 Crystal ID Synovial Fluid [[id number]]   (Abnormal)   Synovial fluid from Elbow, Right    Final result Component Value   Crystals Analysis Positive for extracellular crystals, consistent with monosodium urate crystals.            08/07/2024 1823 08/07/2024 2028 Cell count with differential fluid [[id number]]    (Abnormal)   Synovial fluid from Elbow, Right    Final result Component Value   No component results            08/07/2024 1823 08/07/2024 2025 Cell Count Body Fluid [[id number]]    (Abnormal)   Synovial fluid from Elbow, Right    Final result Component Value   Color Red   Clarity Turbid   Cell Count Fluid Source Elbow, Right   Right Olecranon Bursa Fluid #2   Total Nucleated Cells --   Specimen clotted, result unavailable.            08/07/2024 1823 08/07/2024 2028 Differential Body Fluid [[id number]]    Synovial fluid from Elbow, Right    Final result Component Value   % Neutrophils --   % Lymphocytes --   % Monocyte/Macrophages --            08/07/2024 1820 08/12/2024 2216 Anaerobic Bacterial Culture Routine [[id number]]   Tissue from Elbow, Right    Preliminary result Component Value   Culture No anaerobic organisms isolated after 5 days  [P]                08/07/2024 1820 08/07/2024 2239 Gram Stain [[id number]]   Tissue from Elbow, Right    Final result Component Value   GS Culture See corresponding culture for results   Gram Stain Result No organisms seen   Gram Stain Result 1+ WBC seen            08/07/2024 1820 08/12/2024 0725 Tissue Aerobic Bacterial Culture Routine [[id number]]   Tissue from Elbow, Right    Final result  Component Value   Culture No Growth               08/07/2024 1818 08/12/2024 2216 Anaerobic Bacterial Culture Routine [[id number]]   Wound from Elbow, Right    Preliminary result Component Value   Culture No anaerobic organisms isolated after 5 days  [P]                08/07/2024 1818 08/07/2024 2234 Gram Stain [[id number]]   Wound from Elbow, Right    Final result Component Value   GS Culture See corresponding culture for results   Gram Stain Result No organisms seen   Gram Stain Result 1+ WBC seen            08/07/2024 1818 08/09/2024 1000 Wound Aerobic Bacterial Culture Routine [[id number]]   Wound from Elbow, Right    Final result Component Value   Culture No Growth               08/07/2024 1815 08/07/2024 2130 Cell count with differential fluid [[id number]]    (Abnormal)   Synovial fluid from Elbow, Right    Final result Component Value   No component results            08/07/2024 1815 08/07/2024 1935 Crystal ID Synovial Fluid [[id number]]   (Abnormal)   Synovial fluid from Elbow, Right    Final result Component Value   Crystals Analysis Positive for intracellular crystals, consistent with monosodium urate crystals.            08/07/2024 1815 08/07/2024 2129 Cell Count Body Fluid [[id number]]    (Abnormal)   Synovial fluid from Elbow, Right    Final result Component Value   Color Red   Clarity Cloudy   Cell Count Fluid Source Elbow, Right   Total Nucleated Cells --   Specimen Clotted- Cell count not performed.            08/07/2024 1815 08/07/2024 2130 Differential Body Fluid [[id number]]    Synovial fluid from Elbow, Right    Final result Component Value   No component results                     RADIOLOGY:  Personally Reviewed.  No results found for this or any previous visit (from the past 24 hour(s)).    Active Problems:    Acute pain of right shoulder    Fever, unspecified fever cause     Attestation:  I have reviewed today's Medications, Vital Signs, and Labs.

## 2024-08-13 NOTE — PROGRESS NOTES
[facility]opaedics Progress Note      Post-operative Day: 6 Days Post-Op    Procedure(s):  IRRIGATION AND DEBRIDEMENT, SHOULDER  EXCISION, CLAVICLE, DISTAL  IRRIGATION AND DEBRIDEMENT, ELBOW AND THUMB      Subjective: Patient seen resting in bed, no acute events overnight. Reports minimal pain to shoulder, arm and hand. Pain to right knee unchanged from yesterday. Tolerating a regular diet with no nausea or vomiting. Has remained afebrile and HD stable.     Pain: as above  Chest pain, SOB:  No      Objective:  Blood pressure (!) 159/75, pulse 81, temperature 97.8  F (36.6  C), temperature source Oral, resp. rate 16, height 1.829 m (6'), weight 86.6 kg (191 lb), SpO2 97%.    Patient Vitals for the past 24 hrs:   BP Temp Temp src Pulse Resp SpO2   08/13/24 0811 (!) 159/75 97.8  F (36.6  C) Oral 81 16 97 %   08/13/24 0418 (!) 156/75 98  F (36.7  C) Oral 75 16 93 %   08/12/24 2340 (!) 163/80 98.1  F (36.7  C) Oral 85 17 95 %   08/12/24 1626 (!) 176/83 98  F (36.7  C) Oral 88 16 93 %   08/12/24 1300 (!) 175/79 -- -- -- -- --       Wt Readings from Last 4 Encounters:   08/04/24 86.6 kg (191 lb)   07/29/24 86.2 kg (190 lb)   07/25/24 87.1 kg (192 lb)   07/19/24 86.6 kg (191 lb)       General: Alert and orientated, NAD  Respiratory: Non-labored breathing on RA  RUE in sling with mild swelling to right hand, has been stable.  Sensation, and circulation intact. RUE grasp 3/5. BLE CMS intact. Right knee tenderness to palpation. No surrounding erythema or drainage noted.   , Dorsiflexion/plantarflexion intact and equal bilaterally. Moves all other extremities with ease and purpose   Wound status: incisions are clean dry and intact. Yes  Calf tenderness: Bilateral  No    Pertinent Labs   Lab Results: personally reviewed.     Recent Labs   Lab Test 08/12/24  0442 08/10/24  0731 08/09/24  0528 08/08/24  0618 08/07/24  0645 06/20/24  0829 06/19/24  1316 04/26/24  0852 04/26/24  0430 08/26/22  0942 08/25/22  1746  06/16/22  0934 12/25/21 2000 07/29/21  0657 07/28/21  0337 04/29/21  1631 07/28/20  0626   INR  --   --   --   --   --   --  1.04  --  1.11  --   --   --  1.17*  --   --   --   --    WBC  --  8.5 8.5 8.8 9.0   < >  --    < > 11.4*   < > 12.2*   < > 18.6*   < > 8.9   < > 7.4   HGB 8.6* 8.8* 8.9* 9.6* 9.2*   < >  --    < > 11.9*   < > 10.9*   < > 12.7*   < > 12.1*   < > 10.7*   HCT  --  28.7* 28.9* 30.7* 29.2*   < >  --    < > 35.5*   < > 34.0*   < > 40.0   < > 38.1*   < > 35.2*   MCV  --  88 89 88 86   < >  --    < > 87   < > 89   < > 91   < > 97   < > 92   PLT  --  389 362 400 359   < >  --    < > 253   < > 441   < > 379   < > 264   < > 219     --   --  139 136   < >  --    < > 138   < > 138   < > 136   < > 139   < > 137   CRP  --   --   --   --   --   --   --   --   --   --  4.7*  --   --   --  84.2*  --  48.3*    < > = values in this interval not displayed.       Plan:   Anticoagulation protocol: ASA 81 mg daily    Pain medications: oxycodone, dilaudid, tylenol, and Robaxin   Weight bearing status: NWfemi PAT for ROM as tolerated, sling PRN   No growth to date on cultures from right knee. ID is following concern for polyarticular gout, on colchicine.  Planning to stop abx and monitor symptoms and labs  Disposition:  May need TCU at discharge, SW following. , follow up with [facility] two weeks from time of surgery for suture removal    Continue cares and rehabilitation     Report completed by:  RANJAN Virgen CNP  Date: 8/13/2024  Time: 12:06 PM

## 2024-08-13 NOTE — PLAN OF CARE
Problem: Comorbidity Management  Goal: Blood Pressure in Desired Range  Intervention: Maintain Blood Pressure Management  Problem: Adult Inpatient Plan of Care  Goal: Optimal Comfort and Wellbeing  Intervention: Monitor Pain and Promote Comfort  Goal Outcome Evaluation:  Patient vital signs are at baseline: Yes  Patient able to ambulate as they were prior to admission or with assist devices provided by therapies during their stay:  No,  Reason: Did not not get out of bed this shift. To work with therapy.  Patient MUST void prior to discharge:  Yes  Patient able to tolerate oral intake:  Yes  Pain has adequate pain control using Oral analgesics:  Yes. Was offered pain medications for comfort but declined.   Does patient have an identified :  Yes  Has goal D/C date and time been discussed with patient:  Yes

## 2024-08-14 ENCOUNTER — APPOINTMENT (OUTPATIENT)
Dept: OCCUPATIONAL THERAPY | Facility: CLINIC | Age: 79
DRG: 464 | End: 2024-08-14
Payer: COMMERCIAL

## 2024-08-14 LAB
BACTERIA SNV CULT: NO GROWTH
BACTERIA TISS BX CULT: NORMAL
BACTERIA WND CULT: NORMAL
CRP SERPL-MCNC: 34.7 MG/L
GLUCOSE BLDC GLUCOMTR-MCNC: 150 MG/DL (ref 70–99)
GLUCOSE BLDC GLUCOMTR-MCNC: 155 MG/DL (ref 70–99)
GLUCOSE BLDC GLUCOMTR-MCNC: 201 MG/DL (ref 70–99)
GLUCOSE BLDC GLUCOMTR-MCNC: 230 MG/DL (ref 70–99)
GLUCOSE BLDC GLUCOMTR-MCNC: 230 MG/DL (ref 70–99)
GLUCOSE BLDC GLUCOMTR-MCNC: 240 MG/DL (ref 70–99)
GRAM STAIN RESULT: NORMAL
GRAM STAIN RESULT: NORMAL
HOLD SPECIMEN: NORMAL

## 2024-08-14 PROCEDURE — 86140 C-REACTIVE PROTEIN: CPT | Performed by: STUDENT IN AN ORGANIZED HEALTH CARE EDUCATION/TRAINING PROGRAM

## 2024-08-14 PROCEDURE — 250N000013 HC RX MED GY IP 250 OP 250 PS 637: Performed by: STUDENT IN AN ORGANIZED HEALTH CARE EDUCATION/TRAINING PROGRAM

## 2024-08-14 PROCEDURE — 99232 SBSQ HOSP IP/OBS MODERATE 35: CPT | Performed by: STUDENT IN AN ORGANIZED HEALTH CARE EDUCATION/TRAINING PROGRAM

## 2024-08-14 PROCEDURE — 120N000001 HC R&B MED SURG/OB

## 2024-08-14 PROCEDURE — 250N000011 HC RX IP 250 OP 636: Performed by: STUDENT IN AN ORGANIZED HEALTH CARE EDUCATION/TRAINING PROGRAM

## 2024-08-14 PROCEDURE — G0463 HOSPITAL OUTPT CLINIC VISIT: HCPCS

## 2024-08-14 PROCEDURE — 97535 SELF CARE MNGMENT TRAINING: CPT | Mod: GO

## 2024-08-14 PROCEDURE — 250N000013 HC RX MED GY IP 250 OP 250 PS 637: Performed by: EMERGENCY MEDICINE

## 2024-08-14 PROCEDURE — 36415 COLL VENOUS BLD VENIPUNCTURE: CPT | Performed by: STUDENT IN AN ORGANIZED HEALTH CARE EDUCATION/TRAINING PROGRAM

## 2024-08-14 RX ADMIN — INSULIN ASPART 3 UNITS: 100 INJECTION, SOLUTION INTRAVENOUS; SUBCUTANEOUS at 16:33

## 2024-08-14 RX ADMIN — COLCHICINE 0.6 MG: 0.6 TABLET ORAL at 21:23

## 2024-08-14 RX ADMIN — OXYCODONE HYDROCHLORIDE 2.5 MG: 5 TABLET ORAL at 08:31

## 2024-08-14 RX ADMIN — INSULIN ASPART 2 UNITS: 100 INJECTION, SOLUTION INTRAVENOUS; SUBCUTANEOUS at 11:32

## 2024-08-14 RX ADMIN — COLCHICINE 0.6 MG: 0.6 TABLET ORAL at 08:25

## 2024-08-14 RX ADMIN — Medication 2 CAPSULE: at 08:23

## 2024-08-14 RX ADMIN — TORSEMIDE 10 MG: 5 TABLET ORAL at 08:24

## 2024-08-14 RX ADMIN — FINASTERIDE 5 MG: 5 TABLET, FILM COATED ORAL at 08:23

## 2024-08-14 RX ADMIN — GABAPENTIN 300 MG: 300 CAPSULE ORAL at 20:04

## 2024-08-14 RX ADMIN — SODIUM BICARBONATE 1950 MG: 650 TABLET ORAL at 08:23

## 2024-08-14 RX ADMIN — INSULIN ASPART 1 UNITS: 100 INJECTION, SOLUTION INTRAVENOUS; SUBCUTANEOUS at 07:02

## 2024-08-14 RX ADMIN — ENOXAPARIN SODIUM 40 MG: 100 INJECTION SUBCUTANEOUS at 11:31

## 2024-08-14 RX ADMIN — LISINOPRIL 10 MG: 10 TABLET ORAL at 13:32

## 2024-08-14 RX ADMIN — GABAPENTIN 300 MG: 300 CAPSULE ORAL at 08:23

## 2024-08-14 RX ADMIN — GABAPENTIN 300 MG: 300 CAPSULE ORAL at 13:31

## 2024-08-14 RX ADMIN — ASPIRIN 81 MG: 81 TABLET, COATED ORAL at 08:23

## 2024-08-14 RX ADMIN — SODIUM BICARBONATE 1950 MG: 650 TABLET ORAL at 13:32

## 2024-08-14 RX ADMIN — SODIUM BICARBONATE 1950 MG: 650 TABLET ORAL at 20:04

## 2024-08-14 ASSESSMENT — ACTIVITIES OF DAILY LIVING (ADL)
ADLS_ACUITY_SCORE: 56
ADLS_ACUITY_SCORE: 56
ADLS_ACUITY_SCORE: 61
ADLS_ACUITY_SCORE: 57
ADLS_ACUITY_SCORE: 56
ADLS_ACUITY_SCORE: 57
ADLS_ACUITY_SCORE: 61
ADLS_ACUITY_SCORE: 56
ADLS_ACUITY_SCORE: 61
ADLS_ACUITY_SCORE: 61
ADLS_ACUITY_SCORE: 56
ADLS_ACUITY_SCORE: 61
ADLS_ACUITY_SCORE: 61
ADLS_ACUITY_SCORE: 57
ADLS_ACUITY_SCORE: 56
ADLS_ACUITY_SCORE: 56
ADLS_ACUITY_SCORE: 57
ADLS_ACUITY_SCORE: 61
ADLS_ACUITY_SCORE: 57
ADLS_ACUITY_SCORE: 61
ADLS_ACUITY_SCORE: 61

## 2024-08-14 NOTE — PLAN OF CARE
Problem: Pain Acute  Goal: Optimal Pain Control and Function  Outcome: Progressing  Intervention: Prevent or Manage Pain  Recent Flowsheet Documentation  Taken 8/13/2024 1940 by Glen Silva, RN  Medication Review/Management: medications reviewed      Patient alert and oriented. VSS. RA. Saline locked. Tolerating Regular diet. Not oob this shift, q2 turns done. Voiding via urinal, incontinent of bowel and bladder. Denies pain. Calls appropriately. Call light within reach. Alarms on.

## 2024-08-14 NOTE — PLAN OF CARE
"  Problem: Adult Inpatient Plan of Care  Goal: Optimal Comfort and Wellbeing  Outcome: Progressing  Intervention: Monitor Pain and Promote Comfort  Recent Flowsheet Documentation  Taken 8/14/2024 0823 by Indigo Caputo RN  Pain Management Interventions:   medication (see MAR)   cold applied     Problem: Pain Acute  Goal: Optimal Pain Control and Function  Outcome: Progressing  Intervention: Develop Pain Management Plan  Recent Flowsheet Documentation  Taken 8/14/2024 0823 by Indigo Caputo RN  Pain Management Interventions:   medication (see MAR)   cold applied  Intervention: Prevent or Manage Pain  Recent Flowsheet Documentation  Taken 8/14/2024 0830 by Indigo Caputo RN  Sensory Stimulation Regulation:   care clustered   lighting decreased  Intervention: Optimize Psychosocial Wellbeing  Recent Flowsheet Documentation  Taken 8/14/2024 0830 by Indigo Caputo RN  Supportive Measures: active listening utilized   Goal Outcome Evaluation:       Patient complains of intermittent pain in right knee and pain with ambulation in left knee. States that he does not trust is his left knee because he is \"bone on bone\". 2.5 mg oxycodone given with good relief. Dressing on right arm wounds changed with dry gauze and tape. All 3 incisions are healing well without redness or drainage. Mepilex on left heel changed, there is no redness or breakdown. Dressing change on right heel done per order. There was minimal brown drainage on old dressing. Patient tolerated procedure well. Up to chair and back to bed with assist of 2.                    "

## 2024-08-14 NOTE — DISCHARGE INSTRUCTIONS
[facility] DISCHARGE INSTRUCTIONS:  R heel  Soak wound with vashe moistened gauze for 5 minutes, pat dry  Apply nickel thick layer of medihoney gel to wound  Cover with mepilex  Change dressing every MWF + PRN if soiled, saturated, falls off  Offload heels while in bed  Call MD for wound decline     Buttocks wound: Every 3 days   Cleanse the area with NS and pat dry.  Apply cavilon advanced barrier film to buttocks skin.  Cover sacral area with Sacral Mepilex (#[id number])  Change dressing Q 3 days.  Turn and reposition Q 2hrs side to side only.  Ensure pt has Mikael-cushion while sitting up in the chair.  FYI- If pt has constant incontinent loose stools needing dressing changes Q shift please discontinue the Mepilex dressing and apply [employer] barrier paste BID and PRN.

## 2024-08-14 NOTE — PROGRESS NOTES
[facility] Orthopaedics Progress Note      Post-operative Day: 7 Days Post-Op    Procedure(s):  IRRIGATION AND DEBRIDEMENT, SHOULDER  EXCISION, CLAVICLE, DISTAL  IRRIGATION AND DEBRIDEMENT, ELBOW AND THUMB      Subjective: Patient seen resting in bedside chair, no acute events overnight. Reports mild improvement to knee pain, continues to reports minimal pain to shoulder, arm and hand. Pain to right knee unchanged from yesterday. Tolerating a regular diet with no nausea or vomiting. Remains afebrile, CRP trending down while off antibiotics      Pain: as above  Chest pain, SOB:  No      Objective:  Blood pressure (!) 146/77, pulse 92, temperature 97.7  F (36.5  C), temperature source Oral, resp. rate 16, height 1.829 m (6'), weight 86.6 kg (191 lb), SpO2 94%.    Patient Vitals for the past 24 hrs:   BP Temp Temp src Pulse Resp SpO2   08/14/24 0757 (!) 146/77 97.7  F (36.5  C) Oral 92 16 94 %   08/14/24 0045 -- -- -- -- -- 93 %   08/13/24 2300 (!) 157/79 98.3  F (36.8  C) Oral 79 16 90 %   08/13/24 1541 (!) 175/81 98  F (36.7  C) Oral 79 16 92 %   08/13/24 1358 (!) 175/84 -- -- 84 -- --       Wt Readings from Last 4 Encounters:   08/04/24 86.6 kg (191 lb)   07/29/24 86.2 kg (190 lb)   07/25/24 87.1 kg (192 lb)   07/19/24 86.6 kg (191 lb)       General: Alert and orientated, NAD  Respiratory: Non-labored breathing on RA  RUE in sling with mild swelling to right hand, has been stable.  Sensation, and circulation intact. RUE grasp 3/5. BLE CMS intact. Right knee tenderness to palpation. No surrounding erythema or drainage noted.   , Dorsiflexion/plantarflexion intact and equal bilaterally. Moves all other extremities with ease and purpose   Wound status: incisions are clean dry and intact. Yes  Calf tenderness: Bilateral  No    Pertinent Labs   Lab Results: personally reviewed.     Recent Labs   Lab Test 08/12/24  0442 08/10/24  0731 08/09/24  0528 08/08/24  0618 08/07/24  0645 06/20/24  0829 06/19/24  1316  04/26/24  0852 04/26/24  0430 08/26/22  0942 08/25/22  1746 06/16/22  0934 12/25/21 2000 07/29/21  0657 07/28/21  0337 04/29/21  1631 07/28/20  0626   INR  --   --   --   --   --   --  1.04  --  1.11  --   --   --  1.17*  --   --   --   --    WBC  --  8.5 8.5 8.8 9.0   < >  --    < > 11.4*   < > 12.2*   < > 18.6*   < > 8.9   < > 7.4   HGB 8.6* 8.8* 8.9* 9.6* 9.2*   < >  --    < > 11.9*   < > 10.9*   < > 12.7*   < > 12.1*   < > 10.7*   HCT  --  28.7* 28.9* 30.7* 29.2*   < >  --    < > 35.5*   < > 34.0*   < > 40.0   < > 38.1*   < > 35.2*   MCV  --  88 89 88 86   < >  --    < > 87   < > 89   < > 91   < > 97   < > 92   PLT  --  389 362 400 359   < >  --    < > 253   < > 441   < > 379   < > 264   < > 219     --   --  139 136   < >  --    < > 138   < > 138   < > 136   < > 139   < > 137   CRP  --   --   --   --   --   --   --   --   --   --  4.7*  --   --   --  84.2*  --  48.3*    < > = values in this interval not displayed.       Plan:   Recommend dressing change today  Anticoagulation protocol: ASA 81 mg daily    Pain medications: oxycodone, dilaudid, tylenol, and Robaxin   Weight bearing status: femi AGUILA for ROM as tolerated, sling PRN   No growth to date on cultures from right knee. ID is following concern for polyarticular gout, on colchicine. CRP trending down while off abx and continue to monitor   Disposition:  May need TCU at discharge, SW following. , follow up with [facility] two weeks from time of surgery for suture removal    Continue cares and rehabilitation     Report completed by:  RANJAN Virgen CNP  Date: 8/14/2024  Time: 12:06 PM

## 2024-08-14 NOTE — CONSULTS
[facility] Nurse Inpatient Assessment     Consulted for: R ankle    Summary: wound was noted to R heel upon pressure injury survey. Patient has had it for months per self report.     Patient History (according to provider note(s):      Updated summary:   Mr. Ibrahim is an 79 years old man with past medical history significant for HFpEF, diabetes, gout recent hospitalization 5/2024 for urosepsis and bacteremia due to Klebsiella. Eventually diagnosed with L4-L5 discitis and 6/19.  Was placed on IV antibiotic with vancomycin and ertapenem. He presented to the hospital on 8/4/2024 with acute right shoulder pain which progressed to involve the right elbow, right thumb.  Working diagnosis is gout attack versus septic arthritis. MRI of the right shoulder showed signs of right acromioclavicular inflammation with subdeltoid bursitis, possible early osteomyelitis. Evaluated by orthopedic surgery. Plans were for I&D of the right shoulder and right elbow on 8/7.  Of pertinence, CT and MRI 7/26 demonstrated resolution of previously noted intramural abscess; infectious disease following.     Status post Irrigation and debridement of the right AC joint, right olecranon bursa, right elbow joint, right thumb MCP joint, Open distal clavicle excision, right, on 8/7/24, with EBL of 50ccs by Dr. Hanson. Also status post ultrasound with aspiration of right knee.  Currently treating both potential septic joint and gout with, respectively, IV antibiotics and colchicine daily.  MRI Cervical-spine 8/10 without any no acute findings, though spondylosis and stenosis are noted.  New provider on 8/11, patient feels about the same and no new symptoms or concerns.  Following cultures.  Anticoagulation plan is aspirin twice daily for 42 days.  Position is eventually for TCU.  Discussed with/updated social work. Pain spikes and adding on two prn doses of toradol. Continue lidocaine cream. Also increased colchicine to  "BID.     Vancomycin was discontinued on 8/12, plan to observe off of IV antibiotics, if the pain improves over the next day, consider possible steroid .  TTE obtained, nonremarkable.  Orthopedics recommending spine team assess for the cervical/lumbar pain, consulted on 8/12 and concurred with current conservative plan and no surgical intervention and felt symptoms could \"be related to erosive changes and loss of height at the L4-5 level due to the prior infection there.\"      On 8/13/2024 pain is 'the same,' so writer will HOLD on starting steroids until Infectious Disease clearance (as steroids may hinder healing but they would certainly have anti-inflammatory properties to help w/ gout). Updated sw. Eventually to tcu.    Assessment:      Pressure Injury Location: R heel    8/13    Last photo: 8/13  Wound type: Pressure Injury     Pressure Injury Stage: 3, present on admission       Wound history/plan of care:   patient stated he was in another facility before this and that is where the wound started, wound is to adipose.     Wound base: 80 % non-granular tissue, 20 % adipose tissue     Palpation of the wound bed: normal      Drainage: small     Description of drainage: serosanguinous     Measurements (length x width x depth, in cm) 1.3  x 1.9  x  0.3 cm      Tunneling N/A     Undermining N/A  Periwound skin:  intact, callused      Color: normal and consistent with surrounding tissue      Temperature: normal   Odor: none  Pain: denies , none  Pain intervention prior to dressing change: slow and gentle cares   Treatment goal: Drainage control, Increase granulation, Protection, and Promote epidermal migration  STATUS: initial assessment  Supplies ordered: ordered medihoney    My PI Risk Assessment     Sensory Perception: 3 - Slightly Limited     Moisture: 4 - Rarely moist     Activity: 3 - Walks occasionally      Mobility: 3 - Slightly limited      Nutrition: 3 - Adequate     Friction/Shear: 2 - Potential problem     "  TOTAL: 18  ___________________________________________________________________________________________________________________________________________________________________________________________________________________________________________    Skin Injury Location: Buttocks    8/13    Last photo: 8/13  Skin injury due to: Chronic skin injury (often related to prolonged recliner use)  Skin history and plan of care:   patient stated he has not had many issues with incontinence but did describe sitting in a recliner most of his day.   Affected area:      Skin assessment: Dry/flaky and Erosion of epidermis     Measurements (length x width x depth, in cm) 9  x 16  x  0.1 cm - area of discoloration with minute skin erosion in scattered areas     Color: normal and consistent with surrounding tissue     Temperature  warm     Drainage: scant .      Color: serous      Odor: none  Pain: mild, tender  Pain interventions prior to dressing change: slow and gentle cares   Treatment goal: Protection  STATUS: initial assessment  Supplies ordered: ordered cavilon advanced       Treatment Plan:     R heel  Soak wound with vashe moistened gauze for 5 minutes, pat dry  Apply nickel thick layer of medihoney gel to wound  Cover with mepilex  Change dressing every MWF + PRN if soiled, saturated, falls off  Offload heels while in bed  Call MD for wound decline    Buttocks wound: Every 3 days   Cleanse the area with NS and pat dry.  Apply cavilon advanced barrier film to buttocks skin.  Cover sacral area with Sacral Mepilex (#[id number])  Change dressing Q 3 days.  Turn and reposition Q 2hrs side to side only.  Ensure pt has Mikael-cushion while sitting up in the chair.  FYI- If pt has constant incontinent loose stools needing dressing changes Q shift please discontinue the Mepilex dressing and apply criticaid barrier paste BID and PRN.        Orders: Written    RECOMMEND PRIMARY TEAM ORDER: None, at this time  Education provided: plan of  care  Discussed plan of care with: Patient and Nurse  [facility] nurse follow-up plan: weekly  Notify WO if wound(s) deteriorate.  Nursing to notify the Provider(s) and re-consult the [facility] Nurse if new skin concern.    DATA:     Current support surface: Standard  Standard Isoflex gel  Containment of urine/stool: Continent of bowel and Indwelling catheter  BMI: Body mass index is 25.9 kg/m .   Active diet order: Orders Placed This Encounter      Regular Diet Adult     Output: I/O last 3 completed shifts:  In: 240 [P.O.:240]  Out: 1905 [Urine:1905]     Labs:   Recent Labs   Lab 08/12/24  0442 08/10/24  0731   HGB 8.6* 8.8*   WBC  --  8.5     Pressure injury risk assessment:   Sensory Perception: 4-->no impairment  Moisture: 3-->occasionally moist  Activity: 1-->bedfast  Mobility: 2-->very limited  Nutrition: 4-->excellent  Friction and Shear: 2-->potential problem  Noé Score: 16    TENZIN Staley RN CWOCN  Pager no longer in use, please contact through [employer] group: [facility] [employer]

## 2024-08-14 NOTE — PROGRESS NOTES
[facility]    Medicine Progress Note - Hospitalist Service    Date of Admission:  8/4/2024    Assessment & Plan   Updated summary:   Mr. Ibrahim is an 79 years old man with past medical history significant for HFpEF, diabetes, gout recent hospitalization 5/2024 for urosepsis and bacteremia due to Klebsiella. Eventually diagnosed with L4-L5 discitis and 6/19.  Was placed on IV antibiotic with vancomycin and ertapenem. He presented to the hospital on 8/4/2024 with acute right shoulder pain which progressed to involve the right elbow, right thumb.  Working diagnosis is gout attack versus septic arthritis. MRI of the right shoulder showed signs of right acromioclavicular inflammation with subdeltoid bursitis, possible early osteomyelitis. Evaluated by orthopedic surgery. Plans were for I&D of the right shoulder and right elbow on 8/7.  Of pertinence, CT and MRI 7/26 demonstrated resolution of previously noted intramural abscess; infectious disease following.    Status post Irrigation and debridement of the right AC joint, right olecranon bursa, right elbow joint, right thumb MCP joint, Open distal clavicle excision, right, on 8/7/24, with EBL of 50ccs by Dr. Hanson. Also status post ultrasound with aspiration of right knee.  Currently treating both potential septic joint and gout with, respectively, IV antibiotics and colchicine daily.  MRI Cervical-spine 8/10 without any no acute findings, though spondylosis and stenosis are noted.  New provider on 8/11, patient feels about the same and no new symptoms or concerns.  Following cultures.  Anticoagulation plan is aspirin twice daily for 42 days.  Position is eventually for TCU.  Discussed with/updated social work. Pain spikes and adding on two prn doses of toradol. Continue lidocaine cream. Also increased colchicine to BID.    Vancomycin was discontinued on 8/12, plan to observe off of IV antibiotics, if the pain improves over the next day,  "consider possible steroid .  TTE obtained, nonremarkable.  Orthopedics recommending spine team assess for the cervical/lumbar pain, consulted on 8/12 and concurred with current conservative plan and no surgical intervention and felt symptoms could \"be related to erosive changes and loss of height at the L4-5 level due to the prior infection there.\"     On 8/13/2024 pain was 'the same,' so held on starting steroids until Infectious Disease clearance (as steroids may hinder healing but they would certainly have anti-inflammatory properties to help w/ gout).     On 8/14/2024 pain is starting to improve somewhat and CRP is trending down. Anticipate starting steroids? Appreciate ID weighing in on when to start steroids. Appreciate surgery following.    Updated sw. Eventually to tcu.    ----------  Polyarticular gout attack  Right shoulder pain  Right thumb pain  A- stable but see summary. Treating w/ iv abx and colchicine, now monitoring off abx. May start steroid but only once ID clears to do so. TTE negative for acute infection. Starting to have some pain improvement for first time 8/14/2024.   P:  -Intraoperative cultures obtained - no culprit organism  -Continue colchicine 0.6 mg increased to BID on 8/12   - consider steroids once, cleared per ID   - eventually after flare-up would start allopurinol   -Continue current pain management  -Recheck CRP 8/13 and 8/14/2024 both downtrending  -Continue vancomycin.    - consider pain team  if refractory  -Starting to have some pain improvement for first time 8/14/2024 - start steroids?      Recent L4-L5 discitis/osteomyelitis  Neck stiffness  -Completed 6 weeks course of IV vancomycin and ertapenem on 8/5.  -Lumbar CT on 7/26/2024 showed resolution of epidural abscess.  -8/6, patient reported neck stiffness.  No clear tenderness to palpation.      Plan  -no acute findings on MRI though spondylosis and stenosis noted  - monitor clinically   - Orthopedics recommending spine " "team assess for the cervical/lumbar pain (?new infection or residual, etc.); they are consulted now on 8/12.     -neurosurgery concurred with current conservative plan and no surgical intervention and felt symptoms could \"be related to erosive changes and loss of height at the L4-5 level due to the prior infection there.\"     Diabetes  -At home on basal insulin 8 units twice daily, glipizide 2.5 mg daily, semaglutide 40 mg daily  - given sugars are a bit labile, will modify to 6units long-acting BID on 8/13  -Continue to hold glipizide and semaglutide     Acute kidney injury on CKD  -Baseline creatinine around 1.1  -Currently on presentation 1.3  -Creatinine improved back to baseline.  Plan  -No further intervention; monitor     Anemia of chronic inflammation with possible component of iron deficiency anemia  -Iron level low at 17 with iron sat index of 9.  Ferritin within normal limits, however, ferritin is an acute phase reactant  -Hemoglobin dropped from around 11-9.2.  No signs of active bleeding.  Plan  -Continue to monitor hemoglobin.  -Would recommend repeating iron studies as an outpatient.    HFpEF  -At home on torsemide 20 mg daily  -Appears euvolemic of his examination. Stable. On RA.   Plan  -PTA Torsemide  -I/Os and weights     Hypertension  -At home on lisinopril 10 mg daily, torsemide 20 mg daily  -Blood pressures currently around 140/60.  -No indication for strict blood pressure control during this hospitalization.  Plan  -PTA Lisinopril  -pain control           Diet: Regular Diet Adult  Snacks/Supplements Adult: Glucerna; Between Meals    DVT Prophylaxis: Enoxaparin (Lovenox) SQ  Barnes Catheter: Not present  Lines: None       Cardiac Monitoring: None  Code Status: Full Code      Clinically Significant Risk Factors              # Hypoalbuminemia: Lowest albumin = 3 g/dL at 8/4/2024  9:24 PM, will monitor as appropriate     # Hypertension: Noted on problem list    # Chronic heart failure with " preserved ejection fraction: heart failure noted on problem list and last echo with EF >50%          # DMII: A1C = 8.0 % (Ref range: <5.7 %) within past 6 months   # Overweight: Estimated body mass index is 25.9 kg/m  as calculated from the following:    Height as of this encounter: 1.829 m (6').    Weight as of this encounter: 86.6 kg (191 lb).        # Financial/Environmental Concerns: none         Disposition Plan     Medically Ready for Discharge: Anticipated in 2-4 Days          Luisito Flanagan MD  Hospitalist Service  [facility]  Securely message with [facility] (more info)  Text page via [facility] Paging/Directory   ______________________________________________________________________    Interval History   NAEO.   Discussed care plans and possible steroid if pain is better  Starting to have some pain improvement for first time 8/14/2024.   Discussed anticipated starting steroids once ID clears  Discussed dispo to TCU once both ID and surgery clears and have final pain plan and likely/possibly steroid plan  Discussed w/ sw        Physical Exam   Vital Signs: Temp: 97.7  F (36.5  C) Temp src: Oral BP: (!) 146/77 Pulse: 92   Resp: 16 SpO2: 94 % O2 Device: None (Room air)    Weight: 191 lbs 0 oz    Gen:Well appearing, well nourished, in no acute distress   HEENT: NC/AT, MMM, JOLIE, EOMI, Supple, no TTP of C-spine  CV: RRR, normal s1, normal s2, no m/r/g  Resp: CTAB, normal I/E effort, no additional respiratory sounds  Abd: +BS, non-tender, non-distended, no guarding or rebound tenderness  Ext: stable Sling, bandage/splint right arm/wrist, all dressings C/D/I. Otherwise No significant deformities or trauma, moving all ext freely  Skin: No erythema, no lesions or rashes.   Neuro:No focal neurologic deficit, AxOx4. Strength and sensation grossly intact  Psych: Pleasant, answering questions appropriately, normal mood/affect. Insight good, judgement intact.       Medical Decision Making       40  MINUTES SPENT BY ME on the date of service doing chart review, history, exam, documentation & further activities per the note.      Data     I have personally reviewed the following data over the past 24 hrs:    Procal: N/A CRP: 34.70 (H) Lactic Acid: N/A         Imaging results reviewed over the past 24 hrs:   No results found for this or any previous visit (from the past 24 hour(s)).

## 2024-08-14 NOTE — PROGRESS NOTES
INFECTIOUS DISEASE FOLLOW UP NOTE    Date: 08/14/2024   CHIEF COMPLAINT:   Chief Complaint   Patient presents with    Shoulder Pain    Fever        ASSESSMENT:    Comorbid conditions affecting immune system: Type II Diabetes mellitus   Active Problems:    Acute pain of right shoulder    Fever, unspecified fever cause     Right Shoulder Pain  Concern for right shoulder septic Arthritis  -acute onset  -CRP elevated on admission 142. WBC 11.8 on admission, improved  -possible gout flare; now with right thumb pain, right elbow pain/bursitis  -blood culture negative to date  -mri ordered of shoulder showing AC joint buritis/inflammation.   -seen by ortho, s/p irrigation and debridement of right shoulder, clavicle, elbow, and thumb 8/7/2024, cultures negative to date  -synovial fluid right elbow showing monosodium urate crystals  -Neck MR without infectious process. Spondylosis and stenosis noted    Right knee pain  -History of right TKA  -xray with small effusion  -concern for PJI, s/p aspiration on 8/9. Elevated wbc and PMNs, but culture negative to date. No crystals seen     Recent L4-L5 discitis/osteomyelitis  -s/p aspiration on 6/19 - culture negative  -treated with 6 weeks IV vancomycin and Ertapenem - completed  -Repeat lumbar CT on 7/26/2024 showed resolution of epidural abscess    Summary: Patient with history of gout with hyperuricemia not on allopurinol but on colchicine admitted with multiple joint pain and effusion with confirmed gout in right elbow.  Has prosthetic right knee.  Needs left knee TKA.  Recent history of lumbar discitis with negative cultures status post 6 weeks of IV antibiotic completed on 7/31/2024.  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  If he has infection of the right prosthetic knee, antibiotics alone are not going to be sufficient but I do not have enough evidence to recommend right prosthetic knee surgery.  After long discussion with the patient on  8/12/2024, our conclusion is to stop the IV antibiotic and observe while treating the gout.  If he has signs of infection especially of the right prosthetic knee, then surgical debridement will be needed.    Pain slowly improving and CRP slowly decreasing despite discontinuation of antibiotic therapy.  I am getting more and more convinced that this is polyarticular gout.      Latest Reference Range & Units Most Recent 07/26/24 08:41 08/01/24 08:35 08/04/24 21:24 08/07/24 06:45 08/09/24 11:22 08/10/24 07:31 08/13/24 13:28 08/14/24 06:25   CRP Inflammation <5.00 mg/L 34.70 (H)  8/14/24 06:25 9.62 (H) 19.20 (H) 142.00 (H) 161.00 (H) 120.00 (H) 88.00 (H) 53.20 (H) 34.70 (H)   (H): Data is abnormally high  Recommendations:   -Observe off antibiotics.  -Repeat CRP tomorrow morning.  -Monitor pain    Celina Schwartz MD  [facility]  Direct messaging: unrival Paging   On-Call ID provider: [phone], option: [phone]     ______________________________________________________________________    SUBJECTIVE / INTERVAL HISTORY:  Overall pain is improving.  CRP trending down.    OBJECTIVE:  BP (!) 146/77 (BP Location: Left arm)   Pulse 92   Temp 97.7  F (36.5  C) (Oral)   Resp 16   Ht 1.829 m (6')   Wt 86.6 kg (191 lb)   SpO2 94%   BMI 25.90 kg/m       Resp: 16    GEN: No acute distress.    RESPIRATORY:  Normal breathing pattern. Clear to auscultation bilaterally  CARDIOVASCULAR:  Regular rate and rhythm. No murmur, click, gallop or rub.   ABDOMEN:  Soft, normal bowel sounds, non-tender,   EXTREMITIES: more range of motion in shoulder and elbow. Right knee is warm. Pain with passive movement  SKIN/HAIR/NAILS:  No rashes    Pertinent labs:  CRP Inflammation   Date Value Ref Range Status   07/28/2020 48.3 (H) 0.0 - 8.0 mg/L Final     CRP   Date Value Ref Range Status   08/25/2022 4.7 (H) 0.0 - <0.8 mg/dL Final      CBC RESULTS:   Recent Labs   Lab Test 08/08/24  0618   WBC 8.8   RBC 3.49*   HGB  9.6*   HCT 30.7*   MCV 88   MCH 27.5   MCHC 31.3*   RDW 15.7*         Last Comprehensive Metabolic Panel:  Sodium   Date Value Ref Range Status   08/12/2024 140 135 - 145 mmol/L Final   04/29/2021 139 133 - 144 mmol/L Final     Potassium   Date Value Ref Range Status   08/12/2024 3.9 3.4 - 5.3 mmol/L Final   08/27/2022 4.6 3.5 - 5.0 mmol/L Final   04/29/2021 4.7 3.4 - 5.3 mmol/L Final     Chloride   Date Value Ref Range Status   08/12/2024 104 98 - 107 mmol/L Final   08/27/2022 108 (H) 98 - 107 mmol/L Final   04/29/2021 107 94 - 109 mmol/L Final     Carbon Dioxide   Date Value Ref Range Status   04/29/2021 27 20 - 32 mmol/L Final     Carbon Dioxide (CO2)   Date Value Ref Range Status   08/12/2024 29 22 - 29 mmol/L Final   08/27/2022 22 22 - 31 mmol/L Final     Anion Gap   Date Value Ref Range Status   08/12/2024 7 7 - 15 mmol/L Final   08/27/2022 8 5 - 18 mmol/L Final   04/29/2021 5 3 - 14 mmol/L Final     Glucose   Date Value Ref Range Status   08/27/2022 131 (H) 70 - 125 mg/dL Final   04/29/2021 212 (H) 70 - 99 mg/dL Final     GLUCOSE BY METER POCT   Date Value Ref Range Status   08/14/2024 150 (H) 70 - 99 mg/dL Final     Urea Nitrogen   Date Value Ref Range Status   08/12/2024 18.3 8.0 - 23.0 mg/dL Final   08/27/2022 16 8 - 28 mg/dL Final   04/29/2021 27 7 - 30 mg/dL Final     Creatinine   Date Value Ref Range Status   08/12/2024 1.06 0.67 - 1.17 mg/dL Final   04/29/2021 1.19 0.66 - 1.25 mg/dL Final     GFR Estimate   Date Value Ref Range Status   08/12/2024 71 >60 mL/min/1.73m2 Final     Comment:     eGFR calculated using 2021 CKD-EPI equation.   04/29/2021 59 (L) >60 mL/min/[1.73_m2] Final     Comment:     Non  GFR Calc  Starting 12/18/2018, serum creatinine based estimated GFR (eGFR) will be   calculated using the Chronic Kidney Disease Epidemiology Collaboration   (CKD-EPI) equation.       GFR, ESTIMATED POCT   Date Value Ref Range Status   12/03/2021 15 (L) >60 mL/min/1.73m2 Final      Calcium   Date Value Ref Range Status   08/12/2024 8.8 8.8 - 10.4 mg/dL Final     Comment:     Reference intervals for this test were updated on 7/16/2024 to reflect our healthy population more accurately. There may be differences in the flagging of prior results with similar values performed with this method. Those prior results can be interpreted in the context of the updated reference intervals.   04/29/2021 9.3 8.5 - 10.1 mg/dL Final        MICROBIOLOGY DATA:  Personally reviewed.  7-Day Micro Results       Collected Updated Procedure Result Status      08/09/2024 1231 08/09/2024 1403 Cell count with differential fluid [[id number]]    (Abnormal)   Synovial fluid from Shoulder, Right    Final result Component Value   No component results            08/09/2024 1231 08/13/2024 1806 Anaerobic Bacterial Culture Routine [[id number]]   Synovial fluid from Shoulder, Right    Preliminary result Component Value   Culture No anaerobic organisms isolated after 4 days  [P]                08/09/2024 1231 08/09/2024 1403 Cell Count Body Fluid [[id number]]    (Abnormal)   Synovial fluid from Shoulder, Right    Final result Component Value Units   Color Yellow    Clarity Hazy    Cell Count Fluid Source Knee, Right    Total Nucleated Cells 2,851 /uL            08/09/2024 1231 08/09/2024 1403 Differential Body Fluid [[id number]]    Synovial fluid from Shoulder, Right    Final result Component Value Units   % Neutrophils 80 %   % Lymphocytes 6 %   % Monocyte/Macrophages 13 %   % Lining Cells 1 %            08/09/2024 1231 08/09/2024 1417 Crystal ID Synovial Fluid [[id number]]   Synovial fluid from Shoulder, Right    Final result Component Value   Crystals Analysis No clinically significant crystals seen.            08/09/2024 1230 08/14/2024 0704 Synovial fluid Aerobic Bacterial Culture Routine With Gram Stain [[id number]]   Synovial fluid from Shoulder, Right    Final result Component Value   Culture No Growth    Gram Stain Result No organisms seen    3+ WBC seen    Predominantly PMNs               08/07/2024 1848 08/14/2024 0808 Anaerobic Bacterial Culture Routine [[id number]]   Tissue from Thumb, Right    Final result Component Value   Culture No anaerobic organisms isolated               08/07/2024 1848 08/07/2024 2236 Gram Stain [[id number]]   Tissue from Thumb, Right    Final result Component Value   GS Culture See corresponding culture for results   Gram Stain Result No organisms seen   Gram Stain Result 1+ WBC seen            08/07/2024 1848 08/12/2024 0725 Tissue Aerobic Bacterial Culture Routine [[id number]]   Tissue from Thumb, Right    Final result Component Value   Culture No Growth               08/07/2024 1835 08/14/2024 0809 Anaerobic Bacterial Culture Routine [[id number]]   Tissue from Clavicle, Right    Final result Component Value   Culture No anaerobic organisms isolated               08/07/2024 1835 08/07/2024 2242 Gram Stain [[id number]]   Tissue from Clavicle, Right    Final result Component Value   GS Culture See corresponding culture for results   Gram Stain Result No organisms seen   Gram Stain Result 3+ WBC seen   Predominantly PMNs            08/07/2024 1835 08/12/2024 0725 Tissue Aerobic Bacterial Culture Routine [[id number]]   Tissue from Clavicle, Right    Final result Component Value   Culture No Growth               08/07/2024 1828 08/14/2024 0814 Anaerobic Bacterial Culture Routine [[id number]]   Wound from Elbow, Right    Final result Component Value   Culture No anaerobic organisms isolated               08/07/2024 1828 08/07/2024 2223 Gram Stain [[id number]]   Wound from Elbow, Right    Final result Component Value   GS Culture See corresponding culture for results   Gram Stain Result No organisms seen   Gram Stain Result 1+ WBC seen            08/07/2024 1828 08/09/2024 1000 Wound Aerobic Bacterial Culture Routine [[id number]]   Wound from Elbow, Right    Final result  Component Value   Culture No Growth               08/07/2024 1824 08/14/2024 0814 Anaerobic Bacterial Culture Routine [[id number]]   Wound from Elbow, Right    Final result Component Value   Culture No anaerobic organisms isolated               08/07/2024 1824 08/07/2024 2225 Gram Stain [[id number]]   Wound from Elbow, Right    Final result Component Value   GS Culture See corresponding culture for results   Gram Stain Result No organisms seen   Gram Stain Result 1+ WBC seen            08/07/2024 1824 08/09/2024 1000 Wound Aerobic Bacterial Culture Routine [[id number]]   Wound from Elbow, Right    Final result Component Value   Culture No Growth               08/07/2024 1823 08/07/2024 2014 Crystal ID Synovial Fluid [[id number]]   (Abnormal)   Synovial fluid from Elbow, Right    Final result Component Value   Crystals Analysis Positive for extracellular crystals, consistent with monosodium urate crystals.            08/07/2024 1823 08/07/2024 2028 Cell count with differential fluid [[id number]]    (Abnormal)   Synovial fluid from Elbow, Right    Final result Component Value   No component results            08/07/2024 1823 08/07/2024 2025 Cell Count Body Fluid [[id number]]    (Abnormal)   Synovial fluid from Elbow, Right    Final result Component Value   Color Red   Clarity Turbid   Cell Count Fluid Source Elbow, Right   Right Olecranon Bursa Fluid #2   Total Nucleated Cells --   Specimen clotted, result unavailable.            08/07/2024 1823 08/07/2024 2028 Differential Body Fluid [[id number]]    Synovial fluid from Elbow, Right    Final result Component Value   % Neutrophils --   % Lymphocytes --   % Monocyte/Macrophages --            08/07/2024 1820 08/14/2024 0809 Anaerobic Bacterial Culture Routine [[id number]]   Tissue from Elbow, Right    Final result Component Value   Culture No anaerobic organisms isolated               08/07/2024 1820 08/07/2024 2239 Gram Stain [[id number]]   Tissue from  Elbow, Right    Final result Component Value   GS Culture See corresponding culture for results   Gram Stain Result No organisms seen   Gram Stain Result 1+ WBC seen            08/07/2024 1820 08/12/2024 0725 Tissue Aerobic Bacterial Culture Routine [[id number]]   Tissue from Elbow, Right    Final result Component Value   Culture No Growth               08/07/2024 1818 08/14/2024 0814 Anaerobic Bacterial Culture Routine [[id number]]   Wound from Elbow, Right    Final result Component Value   Culture No anaerobic organisms isolated               08/07/2024 1818 08/07/2024 2234 Gram Stain [[id number]]   Wound from Elbow, Right    Final result Component Value   GS Culture See corresponding culture for results   Gram Stain Result No organisms seen   Gram Stain Result 1+ WBC seen            08/07/2024 1818 08/09/2024 1000 Wound Aerobic Bacterial Culture Routine [[id number]]   Wound from Elbow, Right    Final result Component Value   Culture No Growth               08/07/2024 1815 08/07/2024 2130 Cell count with differential fluid [[id number]]    (Abnormal)   Synovial fluid from Elbow, Right    Final result Component Value   No component results            08/07/2024 1815 08/07/2024 1935 Crystal ID Synovial Fluid [[id number]]   (Abnormal)   Synovial fluid from Elbow, Right    Final result Component Value   Crystals Analysis Positive for intracellular crystals, consistent with monosodium urate crystals.            08/07/2024 1815 08/07/2024 2129 Cell Count Body Fluid [[id number]]    (Abnormal)   Synovial fluid from Elbow, Right    Final result Component Value   Color Red   Clarity Cloudy   Cell Count Fluid Source Elbow, Right   Total Nucleated Cells --   Specimen Clotted- Cell count not performed.            08/07/2024 1815 08/07/2024 2130 Differential Body Fluid [[id number]]    Synovial fluid from Elbow, Right    Final result Component Value   No component results                     RADIOLOGY:  Personally  Reviewed.  No results found for this or any previous visit (from the past 24 hour(s)).    Active Problems:    Acute pain of right shoulder    Fever, unspecified fever cause     Attestation:  I have reviewed today's Medications, Vital Signs, and Labs.

## 2024-08-14 NOTE — PLAN OF CARE
Problem: Adult Inpatient Plan of Care  Goal: Absence of Hospital-Acquired Illness or Injury  Intervention: Identify and Manage Fall Risk  Problem: Comorbidity Management  Goal: Blood Glucose Levels Within Targeted Range  Intervention: Monitor and Manage Glycemia  Problem: Pain Acute  Goal: Optimal Pain Control and Function  Intervention: Prevent or Manage Pain    Goal Outcome Evaluation:  Patient vital signs are at baseline: Yes  Patient able to ambulate as they were prior to admission or with assist devices provided by therapies during their stay:  No,  Reason: Encouraged to get up from bed but declined. Non weight bearing to RUE. Turned and repositioned as needed.   Patient MUST void prior to discharge:  Yes  Patient able to tolerate oral intake:  Yes  Pain has adequate pain control using Oral analgesics:  Yes. Patient denied pain.  Does patient have an identified :  Yes  Has goal D/C date and time been discussed with patient:  Yes

## 2024-08-15 ENCOUNTER — APPOINTMENT (OUTPATIENT)
Dept: OCCUPATIONAL THERAPY | Facility: CLINIC | Age: 79
DRG: 464 | End: 2024-08-15
Payer: COMMERCIAL

## 2024-08-15 ENCOUNTER — APPOINTMENT (OUTPATIENT)
Dept: PHYSICAL THERAPY | Facility: CLINIC | Age: 79
DRG: 464 | End: 2024-08-15
Payer: COMMERCIAL

## 2024-08-15 LAB
CRP SERPL-MCNC: 20.9 MG/L
GLUCOSE BLDC GLUCOMTR-MCNC: 150 MG/DL (ref 70–99)
GLUCOSE BLDC GLUCOMTR-MCNC: 156 MG/DL (ref 70–99)
GLUCOSE BLDC GLUCOMTR-MCNC: 163 MG/DL (ref 70–99)
GLUCOSE BLDC GLUCOMTR-MCNC: 202 MG/DL (ref 70–99)
GLUCOSE BLDC GLUCOMTR-MCNC: 272 MG/DL (ref 70–99)

## 2024-08-15 PROCEDURE — 250N000013 HC RX MED GY IP 250 OP 250 PS 637: Performed by: EMERGENCY MEDICINE

## 2024-08-15 PROCEDURE — 250N000011 HC RX IP 250 OP 636: Performed by: STUDENT IN AN ORGANIZED HEALTH CARE EDUCATION/TRAINING PROGRAM

## 2024-08-15 PROCEDURE — 97530 THERAPEUTIC ACTIVITIES: CPT | Mod: GP

## 2024-08-15 PROCEDURE — 120N000001 HC R&B MED SURG/OB

## 2024-08-15 PROCEDURE — 86140 C-REACTIVE PROTEIN: CPT | Performed by: STUDENT IN AN ORGANIZED HEALTH CARE EDUCATION/TRAINING PROGRAM

## 2024-08-15 PROCEDURE — 97535 SELF CARE MNGMENT TRAINING: CPT | Mod: GO

## 2024-08-15 PROCEDURE — 99232 SBSQ HOSP IP/OBS MODERATE 35: CPT | Performed by: STUDENT IN AN ORGANIZED HEALTH CARE EDUCATION/TRAINING PROGRAM

## 2024-08-15 PROCEDURE — 250N000013 HC RX MED GY IP 250 OP 250 PS 637: Performed by: STUDENT IN AN ORGANIZED HEALTH CARE EDUCATION/TRAINING PROGRAM

## 2024-08-15 PROCEDURE — 36415 COLL VENOUS BLD VENIPUNCTURE: CPT | Performed by: STUDENT IN AN ORGANIZED HEALTH CARE EDUCATION/TRAINING PROGRAM

## 2024-08-15 PROCEDURE — 97110 THERAPEUTIC EXERCISES: CPT | Mod: GP

## 2024-08-15 RX ADMIN — FINASTERIDE 5 MG: 5 TABLET, FILM COATED ORAL at 08:55

## 2024-08-15 RX ADMIN — ENOXAPARIN SODIUM 40 MG: 100 INJECTION SUBCUTANEOUS at 11:30

## 2024-08-15 RX ADMIN — TORSEMIDE 10 MG: 5 TABLET ORAL at 08:55

## 2024-08-15 RX ADMIN — ASPIRIN 81 MG: 81 TABLET, COATED ORAL at 08:55

## 2024-08-15 RX ADMIN — GABAPENTIN 300 MG: 300 CAPSULE ORAL at 19:45

## 2024-08-15 RX ADMIN — LISINOPRIL 10 MG: 10 TABLET ORAL at 14:26

## 2024-08-15 RX ADMIN — SODIUM BICARBONATE 1950 MG: 650 TABLET ORAL at 08:55

## 2024-08-15 RX ADMIN — COLCHICINE 0.6 MG: 0.6 TABLET ORAL at 08:55

## 2024-08-15 RX ADMIN — GABAPENTIN 300 MG: 300 CAPSULE ORAL at 08:55

## 2024-08-15 RX ADMIN — INSULIN ASPART 2 UNITS: 100 INJECTION, SOLUTION INTRAVENOUS; SUBCUTANEOUS at 12:14

## 2024-08-15 RX ADMIN — Medication 2 CAPSULE: at 08:54

## 2024-08-15 RX ADMIN — SODIUM BICARBONATE 1950 MG: 650 TABLET ORAL at 19:45

## 2024-08-15 RX ADMIN — COLCHICINE 0.6 MG: 0.6 TABLET ORAL at 21:11

## 2024-08-15 RX ADMIN — INSULIN ASPART 1 UNITS: 100 INJECTION, SOLUTION INTRAVENOUS; SUBCUTANEOUS at 16:48

## 2024-08-15 RX ADMIN — SODIUM BICARBONATE 1950 MG: 650 TABLET ORAL at 14:25

## 2024-08-15 RX ADMIN — INSULIN ASPART 1 UNITS: 100 INJECTION, SOLUTION INTRAVENOUS; SUBCUTANEOUS at 08:59

## 2024-08-15 RX ADMIN — GABAPENTIN 300 MG: 300 CAPSULE ORAL at 14:25

## 2024-08-15 ASSESSMENT — ACTIVITIES OF DAILY LIVING (ADL)
ADLS_ACUITY_SCORE: 61

## 2024-08-15 NOTE — PLAN OF CARE
Problem: Adult Inpatient Plan of Care  Goal: Optimal Comfort and Wellbeing  Outcome: Progressing     Problem: Risk for Delirium  Goal: Improved Sleep  Outcome: Progressing   Goal Outcome Evaluation:    VSS. Resting between cares. Denies pain at rest. Turning self independently in bed. Dressings CDI. ACHS bg checks. Alarms on for safety.

## 2024-08-15 NOTE — PROGRESS NOTES
[facility]    Medicine Progress Note - Hospitalist Service    Date of Admission:  8/4/2024    Assessment & Plan   Updated summary:   Mr. Ibrahim is an 79 years old man with past medical history significant for HFpEF, diabetes, gout, recent hospitalization 5/2024 for urosepsis and bacteremia due to Klebsiella. Eventually diagnosed with L4-L5 discitis and 6/19.  Was placed on IV antibiotic with vancomycin and ertapenem. He presented to the hospital on 8/4/2024 with acute right shoulder pain which progressed to involve the right elbow, right thumb.  Working diagnosis was initially gout attack versus septic arthritis. MRI of the right shoulder showed signs of right acromioclavicular inflammation with subdeltoid bursitis, possible early osteomyelitis. Evaluated by orthopedic surgery. Plans were for I&D of the right shoulder and right elbow on 8/7.  Of pertinence, CT and MRI 7/26 demonstrated resolution of previously noted intramural abscess; infectious disease following.    Status post Irrigation and debridement of the right AC joint, right olecranon bursa, right elbow joint, right thumb MCP joint, Open distal clavicle excision, right, on 8/7/24, with EBL of 50ccs by Dr. Hanson. Also status post ultrasound with aspiration of right knee. Pt was treated with both IV antibiotics and colchicine daily.  MRI Cervical-spine 8/10 without any no acute findings, though spondylosis and stenosis were noted. Anticoagulation plan is aspirin twice daily for 42 days.      Colchicine was increased to twice daily, and ultimately decision made to monitor off of antibiotics, vancomycin discontinued on 8/12.  The patient started to have clinical improvement, on 8/15 for the first time he is able to have improved range of motion in his lower limbs, discussed with infectious disease and given clinic appointment could likely avoid starting steroids for gout (would start allopurinol after flare-up; likely for  "outpatient start).  Appreciate surgery also following-up.  Initial plan was to potentially [id number] Discussed with social work and there is no opening until tomorrow 8/16.    Of note, TTE obtained was nonremarkable.  Orthopedics recommended spine team assessment on 8/12 for the cervical/lumbar pain, and they concurred with conservative measures and no surgical intervention and felt symptoms could \"be related to erosive changes and loss of height at the L4-5 level due to the prior infection there.\"     ----------  Polyarticular gout attack  Right shoulder pain  Right thumb pain  A- stable. Pt received abx and colchicine and now improving on bid colchicine and abx holiday. Could consider steroids on 8/16 but note may impact wound healing, as such could avoid if clinically improving and consider allopurinol on discharge or defer to outpatient follow-up recommendations.  P:  -Intraoperative cultures obtained - no culprit organism  -Continue colchicine 0.6 mg increased to BID on 8/12    - eventually after flare-up would start allopurinol   -Continue current pain management   - consider pain team if refractory    Recent L4-L5 discitis/osteomyelitis  Neck stiffness  -Completed 6 weeks course of IV vancomycin and ertapenem on 8/5.  -Lumbar CT on 7/26/2024 showed resolution of epidural abscess.  -8/6, patient reported neck stiffness.  No clear tenderness to palpation.    - Orthopedics recommended spine team assessment on 8/12 for the cervical/lumbar pain, and they were consulted and concurred with conservative measures and no surgical intervention and felt symptoms could \"be related to erosive changes and loss of height at the L4-5 level due to the prior infection there.\"     Diabetes  -At home on basal insulin 8 units twice daily, glipizide 2.5 mg daily, semaglutide 40 mg daily  - given sugars are a bit labile, will modify to 6units long-acting BID on 8/13  -Continue to hold glipizide and " semaglutide     Acute kidney injury on CKD  -Baseline creatinine around 1.1  -Currently on presentation 1.3  -Creatinine improved back to baseline.  Plan  -No further intervention; monitor     Anemia of chronic inflammation with possible component of iron deficiency anemia  -Iron level low at 17 with iron sat index of 9.  Ferritin within normal limits, however, ferritin is an acute phase reactant  -Hemoglobin dropped from around 11-9.2.  No signs of active bleeding.  Plan  -Continue to monitor hemoglobin.  -Would recommend repeating iron studies as an outpatient.    HFpEF  -At home on torsemide 20 mg daily  -Appears euvolemic of his examination. Stable. On RA.   Plan  -PTA Torsemide  -I/Os and weights     Hypertension  -At home on lisinopril 10 mg daily, torsemide 20 mg daily  -Blood pressures currently around 140/60.  -No indication for strict blood pressure control during this hospitalization.  Plan  -PTA Lisinopril  -pain control           Diet: Regular Diet Adult  Snacks/Supplements Adult: Glucerna; Between Meals    DVT Prophylaxis: Enoxaparin (Lovenox) SQ  Barnes Catheter: Not present  Lines: None       Cardiac Monitoring: None  Code Status: Full Code      Clinically Significant Risk Factors              # Hypoalbuminemia: Lowest albumin = 3 g/dL at 8/4/2024  9:24 PM, will monitor as appropriate     # Hypertension: Noted on problem list    # Chronic heart failure with preserved ejection fraction: heart failure noted on problem list and last echo with EF >50%          # DMII: A1C = 8.0 % (Ref range: <5.7 %) within past 6 months   # Overweight: Estimated body mass index is 25.9 kg/m  as calculated from the following:    Height as of this encounter: 1.829 m (6').    Weight as of this encounter: 86.6 kg (191 lb).        # Financial/Environmental Concerns: none         Disposition Plan     Medically Ready for Discharge: Ready Now           Luisito Flanagan MD  Hospitalist Service  [facility]  Hospital  Securely message with eBuddy (more info)  Text page via [facility] Paging/Directory   ______________________________________________________________________    Interval History   NAEO  Discussed w/ ID this morning, steroids might not be needed given improvement  Discussed w/ [facility] and appreciate surgery also following up  Pt has no new pain or symptoms  And for the first time he is able to have improved range of motion in his lower limbs  Discussed being ready for discharge   Discussed w/ SW  She later updated that facility unable to accept today, earliest is tomorrow    Physical Exam   Vital Signs: Temp: 97.4  F (36.3  C) Temp src: Oral BP: (!) 157/74 Pulse: 74   Resp: 16 SpO2: 92 % O2 Device: None (Room air)    Weight: 191 lbs 0 oz    Gen:Well appearing, well nourished, in no acute distress, pleasant and conversant  HEENT: NC/AT, MMM, JOLIE, EOMI, Supple, no TTP of C-spine  CV: RRR, normal s1, normal s2, no m/r/g  Resp: CTAB, normal I/E effort, no additional respiratory sounds  Abd: +BS, non-tender, non-distended, no guarding or rebound tenderness  Ext: stable Sling. Notable digit amputations bilateral feet, stable and no active swelling or drainage or discharge. Otherwise No significant deformities or trauma, moving all ext freely  Skin: No erythema, no lesions or rashes.   Neuro:No focal neurologic deficit, AxOx4. Strength and sensation grossly intact  Psych: Pleasant, answering questions appropriately, normal mood/affect. Insight good, judgement intact.       Medical Decision Making       49 MINUTES SPENT BY ME on the date of service doing chart review, history, exam, documentation & further activities per the note.      Data     I have personally reviewed the following data over the past 24 hrs:    Procal: N/A CRP: 20.90 (H) Lactic Acid: N/A         Imaging results reviewed over the past 24 hrs:   No results found for this or any previous visit (from the past 24 hour(s)).

## 2024-08-15 NOTE — PLAN OF CARE
Problem: Pain Acute  Goal: Optimal Pain Control and Function  Outcome: Progressing  Intervention: Prevent or Manage Pain  Recent Flowsheet Documentation  Taken 8/14/2024 1630 by Glen Silva, RN  Sensory Stimulation Regulation:   care clustered   lighting decreased  Medication Review/Management: medications reviewed     Patient alert and oriented. VSS. RA. Saline locked. Tolerating Regular diet. ACHS checks done. In bed all day. Voiding spontaneously via urinal. Denies pain. All dressing CDI. Calls appropriately. Call light within reach. Alarms on.

## 2024-08-15 NOTE — PROGRESS NOTES
INFECTIOUS DISEASE FOLLOW UP NOTE    Date: 08/15/2024   CHIEF COMPLAINT:   Chief Complaint   Patient presents with    Shoulder Pain    Fever        ASSESSMENT:    Comorbid conditions affecting immune system: Type II Diabetes mellitus   Active Problems:    Acute pain of right shoulder    Fever, unspecified fever cause     Right Shoulder Pain  Concern for right shoulder septic Arthritis  -acute onset  -CRP elevated on admission 142. WBC 11.8 on admission, improved  -possible gout flare; now with right thumb pain, right elbow pain/bursitis  -blood culture negative to date  -mri ordered of shoulder showing AC joint buritis/inflammation.   -seen by ortho, s/p irrigation and debridement of right shoulder, clavicle, elbow, and thumb 8/7/2024, cultures negative to date  -synovial fluid right elbow showing monosodium urate crystals  -Neck MR without infectious process. Spondylosis and stenosis noted    Right knee pain  -History of right TKA  -xray with small effusion  -concern for PJI, s/p aspiration on 8/9. Elevated wbc and PMNs, but culture negative to date. No crystals seen     Recent L4-L5 discitis/osteomyelitis  -s/p aspiration on 6/19 - culture negative  -treated with 6 weeks IV vancomycin and Ertapenem - completed  -Repeat lumbar CT on 7/26/2024 showed resolution of epidural abscess    Summary: Patient with history of gout with hyperuricemia not on allopurinol but on colchicine admitted with multiple joint pain and effusion with confirmed gout in right elbow.  Has prosthetic right knee.  Needs left knee TKA.  Recent history of lumbar discitis with negative cultures status post 6 weeks of IV antibiotic completed on 7/31/2024.  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  If he has infection of the right prosthetic knee, antibiotics alone are not going to be sufficient but I do not have enough evidence to recommend right prosthetic knee surgery.  After long discussion with the patient on  8/12/2024, our conclusion is to stop the IV antibiotic and observe while treating the gout.  If he has signs of infection especially of the right prosthetic knee, then surgical debridement will be needed.    Pain slowly improving and CRP slowly decreasing despite discontinuation of antibiotic therapy.  I am getting more and more convinced that this is polyarticular gout.    Continue to improve clinically off antibiotic.  CRP improving.      Latest Reference Range & Units Most Recent 07/26/24 08:41 08/01/24 08:35 08/04/24 21:24 08/07/24 06:45 08/09/24 11:22 08/10/24 07:31 08/13/24 13:28 08/14/24 06:25   CRP Inflammation <5.00 mg/L 34.70 (H)  8/14/24 06:25 9.62 (H) 19.20 (H) 142.00 (H) 161.00 (H) 120.00 (H) 88.00 (H) 53.20 (H) 34.70 (H)   (H): Data is abnormally high  Recommendations:   -Observe off antibiotics.  -Monitor pain  -Gout management per primary team.    Discussed with the patient, nursing staff.    ID will sign off.    Celina Schwartz MD  [facility]  Direct messaging: [employer] Paging   On-Call ID provider: [phone], option: 9     ______________________________________________________________________    SUBJECTIVE / INTERVAL HISTORY:  Pleased with how much better he is doing.    OBJECTIVE:  BP (!) 151/72 (BP Location: Left arm)   Pulse 77   Temp 97.7  F (36.5  C) (Oral)   Resp 16   Ht 1.829 m (6')   Wt 86.6 kg (191 lb)   SpO2 92%   BMI 25.90 kg/m       Resp: 16    GEN: No acute distress.    RESPIRATORY:  Normal breathing pattern. Clear to auscultation bilaterally  CARDIOVASCULAR:  Regular rate and rhythm. No murmur, click, gallop or rub.   ABDOMEN:  Soft, normal bowel sounds, non-tender,   EXTREMITIES: more range of motion in shoulder and elbow. Right knee is warm. Pain with passive movement  SKIN/HAIR/NAILS:  No rashes    Pertinent labs:  CRP Inflammation   Date Value Ref Range Status   07/28/2020 48.3 (H) 0.0 - 8.0 mg/L Final     CRP   Date Value Ref Range Status    08/25/2022 4.7 (H) 0.0 - <0.8 mg/dL Final      CBC RESULTS:   Recent Labs   Lab Test 08/08/24  0618   WBC 8.8   RBC 3.49*   HGB 9.6*   HCT 30.7*   MCV 88   MCH 27.5   MCHC 31.3*   RDW 15.7*         Last Comprehensive Metabolic Panel:  Sodium   Date Value Ref Range Status   08/12/2024 140 135 - 145 mmol/L Final   04/29/2021 139 133 - 144 mmol/L Final     Potassium   Date Value Ref Range Status   08/12/2024 3.9 3.4 - 5.3 mmol/L Final   08/27/2022 4.6 3.5 - 5.0 mmol/L Final   04/29/2021 4.7 3.4 - 5.3 mmol/L Final     Chloride   Date Value Ref Range Status   08/12/2024 104 98 - 107 mmol/L Final   08/27/2022 108 (H) 98 - 107 mmol/L Final   04/29/2021 107 94 - 109 mmol/L Final     Carbon Dioxide   Date Value Ref Range Status   04/29/2021 27 20 - 32 mmol/L Final     Carbon Dioxide (CO2)   Date Value Ref Range Status   08/12/2024 29 22 - 29 mmol/L Final   08/27/2022 22 22 - 31 mmol/L Final     Anion Gap   Date Value Ref Range Status   08/12/2024 7 7 - 15 mmol/L Final   08/27/2022 8 5 - 18 mmol/L Final   04/29/2021 5 3 - 14 mmol/L Final     Glucose   Date Value Ref Range Status   08/27/2022 131 (H) 70 - 125 mg/dL Final   04/29/2021 212 (H) 70 - 99 mg/dL Final     GLUCOSE BY METER POCT   Date Value Ref Range Status   08/15/2024 202 (H) 70 - 99 mg/dL Final     Urea Nitrogen   Date Value Ref Range Status   08/12/2024 18.3 8.0 - 23.0 mg/dL Final   08/27/2022 16 8 - 28 mg/dL Final   04/29/2021 27 7 - 30 mg/dL Final     Creatinine   Date Value Ref Range Status   08/12/2024 1.06 0.67 - 1.17 mg/dL Final   04/29/2021 1.19 0.66 - 1.25 mg/dL Final     GFR Estimate   Date Value Ref Range Status   08/12/2024 71 >60 mL/min/1.73m2 Final     Comment:     eGFR calculated using 2021 CKD-EPI equation.   04/29/2021 59 (L) >60 mL/min/[1.73_m2] Final     Comment:     Non  GFR Calc  Starting 12/18/2018, serum creatinine based estimated GFR (eGFR) will be   calculated using the Chronic Kidney Disease Epidemiology  Collaboration   (CKD-EPI) equation.       GFR, ESTIMATED POCT   Date Value Ref Range Status   12/03/2021 15 (L) >60 mL/min/1.73m2 Final     Calcium   Date Value Ref Range Status   08/12/2024 8.8 8.8 - 10.4 mg/dL Final     Comment:     Reference intervals for this test were updated on 7/16/2024 to reflect our healthy population more accurately. There may be differences in the flagging of prior results with similar values performed with this method. Those prior results can be interpreted in the context of the updated reference intervals.   04/29/2021 9.3 8.5 - 10.1 mg/dL Final        MICROBIOLOGY DATA:  Personally reviewed.  7-Day Micro Results       Collected Updated Procedure Result Status      08/09/2024 1231 08/09/2024 1403 Cell count with differential fluid [[id number]]    (Abnormal)   Synovial fluid from Shoulder, Right    Final result Component Value   No component results            08/09/2024 1231 08/14/2024 1805 Anaerobic Bacterial Culture Routine [[id number]]   Synovial fluid from Shoulder, Right    Preliminary result Component Value   Culture No anaerobic organisms isolated after 5 days  [P]                08/09/2024 1231 08/09/2024 1403 Cell Count Body Fluid [[id number]]    (Abnormal)   Synovial fluid from Shoulder, Right    Final result Component Value Units   Color Yellow    Clarity Hazy    Cell Count Fluid Source Knee, Right    Total Nucleated Cells 2,851 /uL            08/09/2024 1231 08/09/2024 1403 Differential Body Fluid [[id number]]    Synovial fluid from Shoulder, Right    Final result Component Value Units   % Neutrophils 80 %   % Lymphocytes 6 %   % Monocyte/Macrophages 13 %   % Lining Cells 1 %            08/09/2024 1231 08/09/2024 1417 Crystal ID Synovial Fluid [[id number]]   Synovial fluid from Shoulder, Right    Final result Component Value   Crystals Analysis No clinically significant crystals seen.            08/09/2024 1230 08/14/2024 0704 Synovial fluid Aerobic Bacterial  Culture Routine With Gram Stain [[id number]]   Synovial fluid from Shoulder, Right    Final result Component Value   Culture No Growth   Gram Stain Result No organisms seen    3+ WBC seen    Predominantly PMNs                        RADIOLOGY:  Personally Reviewed.  No results found for this or any previous visit (from the past 24 hour(s)).    Active Problems:    Acute pain of right shoulder    Fever, unspecified fever cause     Attestation:  I have reviewed today's Medications, Vital Signs, and Labs.

## 2024-08-15 NOTE — PLAN OF CARE
Problem: Adult Inpatient Plan of Care  Goal: Absence of Hospital-Acquired Illness or Injury  Intervention: Identify and Manage Fall Risk  Recent Flowsheet Documentation  Taken 8/15/2024 0845 by Indigo Caputo RN  Safety Promotion/Fall Prevention: safety round/check completed     Problem: Adult Inpatient Plan of Care  Goal: Optimal Comfort and Wellbeing  Intervention: Monitor Pain and Promote Comfort  Recent Flowsheet Documentation  Taken 8/15/2024 0855 by Indigo Caputo RN  Pain Management Interventions:   medication (see MAR)   pillow support provided   repositioned     Problem: Infection  Goal: Absence of Infection Signs and Symptoms  Outcome: Progressing   Goal Outcome Evaluation:       Patient states that his pain has improved from yesterday. He is able to flex and extend his right knee with minimal discomfort. His Left knee remains painful upon standing. Dressings are clean dry and intact.

## 2024-08-15 NOTE — PROGRESS NOTES
Care Management Follow Up    Length of Stay (days): 10    Expected Discharge Date: 08/16/2024     Concerns to be Addressed: discharge planning     Patient plan of care discussed at interdisciplinary rounds: Yes    Anticipated Discharge Disposition: Skilled Nursing Facility (Back to [facility])     Anticipated Discharge Services: Transportation Services  Anticipated Discharge DME: None    Patient/family educated on Medicare website which has current facility and service quality ratings:  (N/A, pt has a bed hold at TCU)  Education Provided on the Discharge Plan: Yes  Patient/Family in Agreement with the Plan: yes    Referrals Placed by CM/TARA:    Private pay costs discussed: Not applicable    Additional Information:  1:17 PM  TARA spoke with [facility].  Facility can accept pt back tomorrow.  TARA submitted for new insurance auth.  Waiting on response.    DOUGLAS Hilario

## 2024-08-15 NOTE — PROGRESS NOTES
[facility] Orthopaedics Progress Note      Post-operative Day: 8 Days Post-Op    Procedure(s):  IRRIGATION AND DEBRIDEMENT, SHOULDER  EXCISION, CLAVICLE, DISTAL  IRRIGATION AND DEBRIDEMENT, ELBOW AND THUMB      Subjective: Patient seen resting in bedside chair, reports right knee pain much improved today minimal pain to right shoulder elbow and hand.  Swelling to hand improved.  Remains afebrile and hemodynamically stable.  CRP continues to trend down  Pain: as above  Chest pain, SOB:  No      Objective:  Blood pressure (!) 151/72, pulse 77, temperature 97.7  F (36.5  C), temperature source Oral, resp. rate 16, height 1.829 m (6'), weight 86.6 kg (191 lb), SpO2 92%.    Patient Vitals for the past 24 hrs:   BP Temp Temp src Pulse Resp SpO2   08/15/24 0816 (!) 151/72 97.7  F (36.5  C) Oral 77 16 92 %   08/14/24 2348 (!) 157/74 97.4  F (36.3  C) Oral 74 16 92 %   08/14/24 1553 (!) 178/81 97.7  F (36.5  C) Oral 80 16 97 %       Wt Readings from Last 4 Encounters:   08/04/24 86.6 kg (191 lb)   07/29/24 86.2 kg (190 lb)   07/25/24 87.1 kg (192 lb)   07/19/24 86.6 kg (191 lb)       General: Alert and orientated, NAD  Respiratory: Non-labored breathing on RA  RUE in sling with mild swelling to right hand, has been stable.  Sensation, and circulation intact. RUE grasp 3/5. BLE CMS intact. Right knee mild tenderness to palpation. No surrounding erythema or drainage noted.   , Dorsiflexion/plantarflexion intact and equal bilaterally. Moves all other extremities with ease and purpose   Wound status: incisions are clean dry and intact. Yes  Calf tenderness: Bilateral  No    Pertinent Labs   Lab Results: personally reviewed.     Recent Labs   Lab Test 08/12/24  0442 08/10/24  0731 08/09/24  0528 08/08/24  0618 08/07/24  0645 06/20/24  0829 06/19/24  1316 04/26/24  0852 04/26/24  0430 08/26/22  0942 08/25/22  1746 06/16/22  0934 12/25/21 2000 07/29/21  0657 07/28/21  0337 04/29/21  1631 07/28/20  0626   INR  --   --   --   --    --   --  1.04  --  1.11  --   --   --  1.17*  --   --   --   --    WBC  --  8.5 8.5 8.8 9.0   < >  --    < > 11.4*   < > 12.2*   < > 18.6*   < > 8.9   < > 7.4   HGB 8.6* 8.8* 8.9* 9.6* 9.2*   < >  --    < > 11.9*   < > 10.9*   < > 12.7*   < > 12.1*   < > 10.7*   HCT  --  28.7* 28.9* 30.7* 29.2*   < >  --    < > 35.5*   < > 34.0*   < > 40.0   < > 38.1*   < > 35.2*   MCV  --  88 89 88 86   < >  --    < > 87   < > 89   < > 91   < > 97   < > 92   PLT  --  389 362 400 359   < >  --    < > 253   < > 441   < > 379   < > 264   < > 219     --   --  139 136   < >  --    < > 138   < > 138   < > 136   < > 139   < > 137   CRP  --   --   --   --   --   --   --   --   --   --  4.7*  --   --   --  84.2*  --  48.3*    < > = values in this interval not displayed.       Plan:   Recommend dressing change today  Anticoagulation protocol: ASA 81 mg daily    Pain medications: oxycodone, dilaudid, tylenol, and Robaxin   Weight bearing status: NWfemi PAT for ROM as tolerated, sling PRN   No growth to date on cultures from right knee. ID is following concern for polyarticular gout, on colchicine. CRP trending down while off abx and continue to monitor   Disposition:  May need TCU at discharge, SW following. , follow up with [facility] two weeks from time of surgery for suture removal    Continue cares and rehabilitation     Report completed by:  RANJAN Virgen CNP  Date: 8/15/2024  Time: 12:39 PM

## 2024-08-16 VITALS
HEIGHT: 72 IN | DIASTOLIC BLOOD PRESSURE: 73 MMHG | BODY MASS INDEX: 25.87 KG/M2 | RESPIRATION RATE: 18 BRPM | WEIGHT: 191 LBS | SYSTOLIC BLOOD PRESSURE: 150 MMHG | TEMPERATURE: 98.2 F | HEART RATE: 84 BPM | OXYGEN SATURATION: 96 %

## 2024-08-16 LAB
CRP SERPL-MCNC: 11.1 MG/L
GLUCOSE BLDC GLUCOMTR-MCNC: 148 MG/DL (ref 70–99)
GLUCOSE BLDC GLUCOMTR-MCNC: 196 MG/DL (ref 70–99)
GLUCOSE BLDC GLUCOMTR-MCNC: 228 MG/DL (ref 70–99)
HOLD SPECIMEN: NORMAL

## 2024-08-16 PROCEDURE — 250N000013 HC RX MED GY IP 250 OP 250 PS 637: Performed by: STUDENT IN AN ORGANIZED HEALTH CARE EDUCATION/TRAINING PROGRAM

## 2024-08-16 PROCEDURE — 250N000011 HC RX IP 250 OP 636: Performed by: INTERNAL MEDICINE

## 2024-08-16 PROCEDURE — 36415 COLL VENOUS BLD VENIPUNCTURE: CPT | Performed by: STUDENT IN AN ORGANIZED HEALTH CARE EDUCATION/TRAINING PROGRAM

## 2024-08-16 PROCEDURE — 99239 HOSP IP/OBS DSCHRG MGMT >30: CPT | Performed by: HOSPITALIST

## 2024-08-16 PROCEDURE — 250N000013 HC RX MED GY IP 250 OP 250 PS 637: Performed by: HOSPITALIST

## 2024-08-16 PROCEDURE — 86140 C-REACTIVE PROTEIN: CPT | Performed by: STUDENT IN AN ORGANIZED HEALTH CARE EDUCATION/TRAINING PROGRAM

## 2024-08-16 PROCEDURE — 250N000011 HC RX IP 250 OP 636: Performed by: HOSPITALIST

## 2024-08-16 PROCEDURE — 250N000013 HC RX MED GY IP 250 OP 250 PS 637: Performed by: EMERGENCY MEDICINE

## 2024-08-16 RX ORDER — ASPIRIN 81 MG/1
81 TABLET ORAL 2 TIMES DAILY
Status: SHIPPED
Start: 2024-08-16 | End: 2024-09-27

## 2024-08-16 RX ORDER — URSODIOL 300 MG/1
300 CAPSULE ORAL 2 TIMES DAILY
Status: DISCONTINUED | OUTPATIENT
Start: 2024-08-16 | End: 2024-08-16 | Stop reason: HOSPADM

## 2024-08-16 RX ORDER — GLIPIZIDE 2.5 MG/1
2.5 TABLET, EXTENDED RELEASE ORAL DAILY
Status: DISCONTINUED | OUTPATIENT
Start: 2024-08-16 | End: 2024-08-16 | Stop reason: HOSPADM

## 2024-08-16 RX ORDER — OXYCODONE HYDROCHLORIDE 5 MG/1
2.5-5 TABLET ORAL EVERY 6 HOURS PRN
Qty: 10 TABLET | Refills: 0 | Status: SHIPPED | OUTPATIENT
Start: 2024-08-16

## 2024-08-16 RX ORDER — COLCHICINE 0.6 MG/1
0.6 TABLET ORAL 2 TIMES DAILY
Status: SHIPPED
Start: 2024-08-16

## 2024-08-16 RX ORDER — TORSEMIDE 10 MG/1
10 TABLET ORAL DAILY
Status: SHIPPED
Start: 2024-08-16

## 2024-08-16 RX ADMIN — SODIUM BICARBONATE 1950 MG: 650 TABLET ORAL at 14:19

## 2024-08-16 RX ADMIN — FINASTERIDE 5 MG: 5 TABLET, FILM COATED ORAL at 08:06

## 2024-08-16 RX ADMIN — INSULIN ASPART 2 UNITS: 100 INJECTION, SOLUTION INTRAVENOUS; SUBCUTANEOUS at 08:05

## 2024-08-16 RX ADMIN — TORSEMIDE 10 MG: 5 TABLET ORAL at 08:06

## 2024-08-16 RX ADMIN — GLIPIZIDE 2.5 MG: 2.5 TABLET, FILM COATED, EXTENDED RELEASE ORAL at 10:09

## 2024-08-16 RX ADMIN — SODIUM BICARBONATE 1950 MG: 650 TABLET ORAL at 08:06

## 2024-08-16 RX ADMIN — URSOSIOL 300 MG: 300 CAPSULE ORAL at 10:09

## 2024-08-16 RX ADMIN — ENOXAPARIN SODIUM 40 MG: 100 INJECTION SUBCUTANEOUS at 11:36

## 2024-08-16 RX ADMIN — INSULIN ASPART 2 UNITS: 100 INJECTION, SOLUTION INTRAVENOUS; SUBCUTANEOUS at 11:36

## 2024-08-16 RX ADMIN — LISINOPRIL 10 MG: 10 TABLET ORAL at 14:21

## 2024-08-16 RX ADMIN — Medication 2 CAPSULE: at 08:05

## 2024-08-16 RX ADMIN — COLCHICINE 0.6 MG: 0.6 TABLET ORAL at 08:06

## 2024-08-16 RX ADMIN — GABAPENTIN 300 MG: 300 CAPSULE ORAL at 08:06

## 2024-08-16 RX ADMIN — GABAPENTIN 300 MG: 300 CAPSULE ORAL at 14:20

## 2024-08-16 RX ADMIN — ASPIRIN 81 MG: 81 TABLET, COATED ORAL at 08:06

## 2024-08-16 ASSESSMENT — ACTIVITIES OF DAILY LIVING (ADL)
ADLS_ACUITY_SCORE: 58
ADLS_ACUITY_SCORE: 61
ADLS_ACUITY_SCORE: 58
ADLS_ACUITY_SCORE: 58
ADLS_ACUITY_SCORE: 61
ADLS_ACUITY_SCORE: 60
ADLS_ACUITY_SCORE: 58
ADLS_ACUITY_SCORE: 58
ADLS_ACUITY_SCORE: 61
ADLS_ACUITY_SCORE: 58
ADLS_ACUITY_SCORE: 61
ADLS_ACUITY_SCORE: 58
ADLS_ACUITY_SCORE: 61
ADLS_ACUITY_SCORE: 61
ADLS_ACUITY_SCORE: 60
ADLS_ACUITY_SCORE: 61

## 2024-08-16 NOTE — PLAN OF CARE
Goal Outcome Evaluation:    Problem: Adult Inpatient Plan of Care  Goal: Absence of Hospital-Acquired Illness or Injury  Intervention: Prevent Skin Injury  Patient vital signs are at baseline: Yes, on RA  Patient able to ambulate as they were prior to admission or with assist devices provided by therapies during their stay:  Yes, up with Ax1 with walker and gaitbelt  Patient MUST void prior to discharge:  Yes, incontinent of bowel and bladder, urinal also within reach;  Patient able to tolerate oral intake:  Yes  Pain has adequate pain control using Oral analgesics:  Yes, denied pain.  Does patient have an identified :  Yes  Has goal D/C date and time been discussed with patient:  Yes, discharging to [facility]. Scheduled wheelchair ride around 4:10pm-4:50pm.     A&Ox4. Other than numbness in the right thumb, CMS intact. IV saline locked.  Sling in place on the RUE. Call light within reach.

## 2024-08-16 NOTE — PROGRESS NOTES
Care Management Follow Up    Length of Stay (days): 11    Expected Discharge Date: 08/16/2024     Concerns to be Addressed: discharge planning     Patient plan of care discussed at interdisciplinary rounds: Yes    Anticipated Discharge Disposition: Skilled Nursing Facility (Back to [facility])     Anticipated Discharge Services: Transportation Services  Anticipated Discharge DME: None    Patient/family educated on Medicare website which has current facility and service quality ratings:  (N/A, pt has a bed hold at TCU)  Education Provided on the Discharge Plan: Yes  Patient/Family in Agreement with the Plan: yes    Referrals Placed by CM/SW:    Private pay costs discussed: Not applicable    Additional Information:  1:20 PM  SW checked [employer] portal for insurance auth status.  It is still pending clinical review.  Pt unable to discharge until insurance auth is approved/denied.    Jena Newell [facility]            Care Management Discharge Note    Discharge Date: 08/16/2024       Discharge Disposition: Transitional Care - [facility]    Discharge Services: Transportation Services    Discharge DME: None    Discharge Transportation: [facility] at 4:10-4:50 pm    Private pay costs discussed: transportation costs    Does the patient's insurance plan have a 3 day qualifying hospital stay waiver?  Yes     Which insurance plan 3 day waiver is available? Alternative insurance waiver    Will the waiver be used for post-acute placement? No    PAS Confirmation Code:  N/A (pt returning to TCU)  Patient/family educated on Medicare website which has current facility and service quality ratings:  (N/A, [facility] bed hold at [facility])    Education Provided on the Discharge Plan: Yes  Persons Notified of Discharge Plans: pt, TCU  Patient/Family in Agreement with the Plan: yes    Handoff Referral Completed: Yes    Additional Information:  2:52 PM  Insurance auth approved for TCU.  Auth #[id number] with approval dates  8/16/24-8/22/24.  TARA scheduled WC ride (per pt request).  Ride between 4:10-4:50 pm.  TARA updated TCU around transport time.  Discharge orders faxed.  No further discharge needs.    Jena Newell, ELIESW

## 2024-08-16 NOTE — PLAN OF CARE
Problem: Pain Acute  Goal: Optimal Pain Control and Function  Intervention: Optimize Psychosocial Wellbeing  Recent Flowsheet Documentation  Taken 8/16/2024 0100 by Antoinette Ferreira RN  Supportive Measures:   active listening utilized   positive reinforcement provided   relaxation techniques promoted   verbalization of feelings encouraged     Problem: Pain Acute  Goal: Optimal Pain Control and Function  Intervention: Prevent or Manage Pain  Recent Flowsheet Documentation  Taken 8/16/2024 0100 by Antoinette Ferreira RN  Sensory Stimulation Regulation:   care clustered   lighting decreased   quiet environment promoted  Sleep/Rest Enhancement:   awakenings minimized   comfort measures   noise level reduced   regular sleep/rest pattern promoted   relaxation techniques promoted   room darkened  Medication Review/Management: medications reviewed     A/Ox4, using call light appropriately. Denies pain overnight. VSS on RA. CMS intact. Dressings CDI. Up with 2, walker and gait belt.

## 2024-08-16 NOTE — PLAN OF CARE
Problem: Pain Acute  Goal: Optimal Pain Control and Function  Outcome: Progressing  Intervention: Prevent or Manage Pain  Recent Flowsheet Documentation  Taken 8/15/2024 2595 by Glen Silva, RN  Medication Review/Management: medications reviewed     Patient alert and oriented. VSS. RA. Saline locked. All dressings CDI. Heels elevated and suspended. Tolerating 60 gram carb diet. ACHS checks done. Voiding via urinal. Denies pain. Calls appropriately. Call light within reach. Alarms on.

## 2024-08-16 NOTE — PROGRESS NOTES
Occupational Therapy Discharge Summary    Reason for therapy discharge:    Discharged to transitional care facility.    Progress towards therapy goal(s). See goals on Care Plan in [facility] electronic health record for goal details.  Goals not met.  Barriers to achieving goals:   limited tolerance for therapy.    Therapy recommendation(s):    Continued therapy is recommended.  Rationale/Recommendations:  Ensure safety at home.

## 2024-08-16 NOTE — PROGRESS NOTES
Physical Therapy Discharge Summary    Reason for therapy discharge:    Discharged to transitional care facility.    Progress towards therapy goal(s). See goals on Care Plan in [facility] electronic health record for goal details.  Goals not met.  Barriers to achieving goals:   discharge from facility and weakness.    Therapy recommendation(s):    Continued therapy is recommended.  Rationale/Recommendations:  TCU.

## 2024-08-16 NOTE — PROGRESS NOTES
Hospitalist follow up note:    Pt ok to discharge medically. Orders placed. Full discharge summary to follow.     Rickey Hinton DO   Pager #: [phone]

## 2024-08-16 NOTE — CONSULTS
SPIRITUAL HEALTH SERVICES ([facility])  SPIRITUAL ASSESSMENT Progress Note  [facility]. [address]    REFERRAL SOURCE: Consult      Mr Ibrahim is preparing for discharge after 11 days In the hospital. He is looking forward to returning to his new home, [facility], in [place] but first needs to go to a TCU for Rehab.     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 He is much more alert and talkative than he has been during the past 11 days.     He is a member of [employer] in [place] where he has been a  for 30+ years.  His family, friends and Choir members are all providing him with support.      PLAN: Given his imminent discharge and strong community of support, [facility] does not have plans to follow him.      Desi Garza, Ph.D., [facility]      [facility] available 24/7 for emergency requests/referrals, either by having the on-call  paged or by entering an ASAP/STAT consult in Epic (this will also page the on-call ).

## 2024-08-16 NOTE — PLAN OF CARE
Goal Outcome Evaluation:  Per NISA Donaldson patient ok to discharge, orders in.  Patient's blood glucose checked 148 prior to discharge, VSS: BP (!) 150/73 (BP Location: Left arm, Patient Position: Semi-Davis's, Cuff Size: Adult Regular)   Pulse 84   Temp 98.2  F (36.8  C) (Oral)   Resp 18   Ht 1.829 m (6')   Wt 86.6 kg (191 lb)   SpO2 96%   BMI 25.90 kg/m  on room air, denied pain.  Dressing to right heel and coccyx changed per wound care orders prior to discharge.     Nursing Discharge Note    Patient discharged to transitional care unit at 4:30 PM via wheel chair. Accompanied by other:[facility] wheelchair transport and staff. Discharge instructions reviewed with patient and [facility] transport , opportunity offered to ask questions. Copy of AVS and folder of diabetic education provided for patient, all questions answered.  Transitional care packet completed and given to [facility] transport.  Prescriptions sent with patient to fill in transitional care packet. All belongings sent with patient.    Ilene Fletcher RN

## 2024-08-16 NOTE — PROGRESS NOTES
[facility] Orthopaedics Progress Note      Post-operative Day: 9 Days Post-Op    Procedure(s):  IRRIGATION AND DEBRIDEMENT, SHOULDER  EXCISION, CLAVICLE, DISTAL  IRRIGATION AND DEBRIDEMENT, ELBOW AND THUMB      Subjective: Patient seen resting in bedside chair, reports he continues to feel well today.  Pain remains minimal.  Range of motion to right knee much improved.  Possible return back to the TCU this afternoon    Chest pain, SOB:  No    Objective:  Blood pressure (!) 150/73, pulse 75, temperature 97.5  F (36.4  C), temperature source Oral, resp. rate 18, height 1.829 m (6'), weight 86.6 kg (191 lb), SpO2 96%.    Patient Vitals for the past 24 hrs:   BP Temp Temp src Pulse Resp SpO2   08/16/24 0753 (!) 150/73 97.5  F (36.4  C) Oral 75 18 96 %   08/16/24 0327 134/72 -- -- -- -- --   08/16/24 0301 (!) 192/89 97.4  F (36.3  C) Oral 81 18 95 %   08/15/24 2302 (!) 145/68 97.9  F (36.6  C) Oral 83 18 95 %   08/15/24 1916 (!) 160/72 97.2  F (36.2  C) Oral 104 18 90 %   08/15/24 1527 (!) 149/75 98.2  F (36.8  C) Oral 85 16 97 %       Wt Readings from Last 4 Encounters:   08/04/24 86.6 kg (191 lb)   07/29/24 86.2 kg (190 lb)   07/25/24 87.1 kg (192 lb)   07/19/24 86.6 kg (191 lb)       General: Alert and orientated, NAD  Respiratory: Non-labored breathing on RA  RUE in sling with mild swelling to right hand, has been stable.  Sensation, and circulation intact. RUE grasp 4/5. BLE CMS intact. Right knee mild tenderness improved . No surrounding erythema or drainage noted.   , Dorsiflexion/plantarflexion intact and equal bilaterally. Moves all other extremities with ease and purpose   Wound status: incisions are clean dry and intact. Yes  Calf tenderness: Bilateral  No    Pertinent Labs   Lab Results: personally reviewed.     Recent Labs   Lab Test 08/12/24  0442 08/10/24  0731 08/09/24  0528 08/08/24  0618 08/07/24  0645 06/20/24  0829 06/19/24  1316 04/26/24  0852 04/26/24  0430 08/26/22  0942 08/25/22  1746  06/16/22  0934 12/25/21 2000 07/29/21  0657 07/28/21  0337 04/29/21  1631 07/28/20  0626   INR  --   --   --   --   --   --  1.04  --  1.11  --   --   --  1.17*  --   --   --   --    WBC  --  8.5 8.5 8.8 9.0   < >  --    < > 11.4*   < > 12.2*   < > 18.6*   < > 8.9   < > 7.4   HGB 8.6* 8.8* 8.9* 9.6* 9.2*   < >  --    < > 11.9*   < > 10.9*   < > 12.7*   < > 12.1*   < > 10.7*   HCT  --  28.7* 28.9* 30.7* 29.2*   < >  --    < > 35.5*   < > 34.0*   < > 40.0   < > 38.1*   < > 35.2*   MCV  --  88 89 88 86   < >  --    < > 87   < > 89   < > 91   < > 97   < > 92   PLT  --  389 362 400 359   < >  --    < > 253   < > 441   < > 379   < > 264   < > 219     --   --  139 136   < >  --    < > 138   < > 138   < > 136   < > 139   < > 137   CRP  --   --   --   --   --   --   --   --   --   --  4.7*  --   --   --  84.2*  --  48.3*    < > = values in this interval not displayed.       Plan:   Recommend dressing change today  Anticoagulation protocol: ASA 81 mg daily    Pain medications: oxycodone, dilaudid, tylenol, and Robaxin   Weight bearing status: NWfemi PAT for ROM as tolerated, sling PRN   No growth to date on cultures from right knee. ID is following concern for polyarticular gout, on colchicine. CRP trending down while off abx and continue to monitor   Disposition:  May need TCU at discharge, SW following. Follow up with [facility] two weeks from time of surgery for suture removal    Continue cares and rehabilitation   Ortho will sign off, please call back with any further questions or concerns.     Report completed by:  RANJAN Virgen CNP  Date: 8/16/2024  Time: 12:39 PM

## 2024-08-16 NOTE — DISCHARGE SUMMARY
[facility] MEDICINE  DISCHARGE SUMMARY     Primary Care Physician: Rickey Adamson  Admission Date: 8/4/2024   Discharge Provider: Rickey Hinton DO Discharge Date: 8/16/2024   Diet:   Active Diet and Nourishment Order   Procedures     Snacks/Supplements Adult: Glucerna; Between Meals     Regular Diet Adult     Diet       Code Status: Full Code   Activity: per PT recs        Condition at Discharge: Stable     REASON FOR PRESENTATION(See Admission Note for Details)   Shoulder pain, elbow pain, rt thumb pain  PRINCIPAL & ACTIVE DISCHARGE DIAGNOSES   Severe polyarticular gout flare  IDDM  CKD  Anemia of chronic inflammation  HFpEF  Essential htn    PENDING LABS     Unresulted Labs Ordered in the Past 30 Days of this Admission       Date and Time Order Name Status Description    8/6/2024 12:25 PM Anaerobic Bacterial Culture Routine Preliminary           PROCEDURES ( this hospitalization only)    Procedure(s):  IRRIGATION AND DEBRIDEMENT, SHOULDER  EXCISION, CLAVICLE, DISTAL  IRRIGATION AND DEBRIDEMENT, ELBOW AND THUMB  RECOMMENDATIONS TO OUTPATIENT PROVIDER FOR F/U VISIT   Follow-up Appointments     Follow Up and recommended labs and tests      Follow up with senior living physician.  The following labs/tests are   recommended: CBC, BMP, CRP.            DISPOSITION   Skilled Nursing Facility  SUMMARY OF HOSPITAL COURSE:    Mr. Ibrahim is an 79 years old man with past medical history significant for HFpEF, diabetes, gout, recent hospitalization 5/2024 for urosepsis and bacteremia due to Klebsiella. Eventually diagnosed with L4-L5 discitis and 6/19.  Was placed on IV antibiotic with vancomycin and ertapenem. He presented to the hospital on 8/4/2024 with acute right shoulder pain which progressed to involve the right elbow, right thumb.  Working diagnosis was initially gout attack versus septic arthritis. MRI of the right shoulder showed signs of right acromioclavicular inflammation with  subdeltoid bursitis, possible early osteomyelitis. Evaluated by orthopedic surgery. Plans were for I&D of the right shoulder and right elbow on 8/7.       Status post Irrigation and debridement of the right AC joint, right olecranon bursa, right elbow joint, right thumb MCP joint, Open distal clavicle excision, right, on 8/7/24, with EBL of 50ccs by Dr. Hanson. Also status post ultrasound with aspiration of right knee. Pt was treated with both IV antibiotics and colchicine daily.  MRI Cervical-spine 8/10 without any acute findings, though spondylosis and stenosis were noted. Anticoagulation plan is aspirin twice daily for 42 days.       Colchicine was increased to twice daily, and ultimately decision made to monitor off of antibiotics, vancomycin discontinued on 8/12.  The patient started to have clinical improvement, on 8/15 for the first time he was able to have improved range of motion in his lower limbs, discussed with infectious disease and given clinic appointment could likely avoid starting steroids for gout (would start allopurinol after flare-up; likely for outpatient start).      Continued to improve off of steroids on higher dose of colchicine. He did not require steroids. When his acute flare of gout has resolved he should be started on allopurinol or other antigout agent.   Discharge Medications with Med changes:     Current Discharge Medication List        CONTINUE these medications which have CHANGED    Details   aspirin 81 MG EC tablet Take 1 tablet (81 mg) by mouth 2 times daily for 42 days Then return to once daily .    Associated Diagnoses: Coronary artery disease involving native coronary artery of native heart without angina pectoris      colchicine (COLCRYS) 0.6 MG tablet Take 1 tablet (0.6 mg) by mouth 2 times daily    Associated Diagnoses: Idiopathic chronic gout of multiple sites without tophus      oxyCODONE (ROXICODONE) 5 MG tablet Take 0.5-1 tablets (2.5-5 mg) by mouth every 6 hours  as needed for moderate to severe pain (2.5 mg for moderate pain, 5 mg for severe pain.)  Qty: 10 tablet, Refills: 0    Associated Diagnoses: Acute right-sided low back pain with right-sided sciatica      torsemide (DEMADEX) 10 MG tablet Take 1 tablet (10 mg) by mouth daily    Associated Diagnoses: Essential hypertension           CONTINUE these medications which have NOT CHANGED    Details   acetaminophen (TYLENOL) 325 MG tablet Take 2 tablets (650 mg) by mouth every 6 hours as needed for pain (and adjunct with moderate or severe pain or per patient request)    Associated Diagnoses: Acute right-sided low back pain with right-sided sciatica      calcium carbonate (TUMS) 500 MG chewable tablet Take 1,000 mg by mouth 3 times daily as needed for heartburn      ferrous gluconate (FERGON) 324 (38 Fe) MG tablet Take 1 tablet (324 mg) by mouth daily    Associated Diagnoses: Iron deficiency anemia, unspecified iron deficiency anemia type      finasteride (PROSCAR) 5 MG tablet Take 5 mg by mouth daily      fluticasone (FLONASE) 50 MCG/ACT nasal spray Spray 1-2 sprays in nostril daily as needed      gabapentin (NEURONTIN) 300 MG capsule Take 300 mg by mouth 3 times daily      glipiZIDE (GLUCOTROL XL) 2.5 MG 24 hr tablet Take 2.5 mg by mouth daily      lactobacillus rhamnosus, GG, (CULTURELL) capsule Take 2 capsules by mouth daily Noon      Lidocaine (LIDOCARE) 4 % Patch Place 1 patch onto the skin every 24 hours To prevent lidocaine toxicity, patient should be patch free for 12 hrs daily.      lisinopril (ZESTRIL) 10 MG tablet Take 1 tablet (10 mg) by mouth every 24 hours  Qty: 90 tablet, Refills: 3    Associated Diagnoses: Chronic diastolic heart failure (H)      loperamide (IMODIUM A-D) 2 MG tablet Take 2 mg by mouth 4 times daily as needed for diarrhea      methocarbamol (ROBAXIN) 500 MG tablet Take 500 mg by mouth every 8 hours as needed for muscle spasms      nortriptyline (PAMELOR) 10 MG capsule Take 20 mg by mouth  nightly as needed      polyethylene glycol-propylene glycol (SYSTANE ULTRA) 0.4-0.3 % SOLN ophthalmic solution 1 drop daily      Semaglutide (RYBELSUS) 14 MG tablet Take 14 mg by mouth daily  Qty: 30 tablet, Refills: 3    Associated Diagnoses: Type 2 diabetes, HbA1c goal < 7% (H)      sodium bicarbonate 650 MG tablet Take 3 tablets (1,950 mg) by mouth 3 times daily  Qty: 180 tablet, Refills: 1    Associated Diagnoses: Acidosis      ursodiol (ACTIGALL) 300 MG capsule Take 1 capsule (300 mg) by mouth 2 times daily  Qty: 60 capsule, Refills: 0    Associated Diagnoses: Biliary stricture (H28)      diclofenac (VOLTAREN) 1 % topical gel Apply 4 g topically 4 times daily as needed for moderate pain Left knee      insulin glargine (LANTUS PEN) 100 UNIT/ML pen Inject 8 Units Subcutaneous 2 times daily Hold if Blood sugar less than 100.    Comments: If Lantus is not covered by insurance, may substitute Basaglar or Semglee or other insulin glargine product per insurance preference at same dose and frequency.    Associated Diagnoses: Diabetic ulcer of toe of right foot associated with diabetes mellitus due to underlying condition, with necrosis of bone (H); Type 2 diabetes mellitus with other skin complication, without long-term current use of insulin (H)      sodium chloride 0.9% infusion Inject 10 mLs into the vein continuously 24 hours before and after antibiotics           STOP taking these medications       ertapenem (INVANZ) 1 GM vial Comments:   Reason for Stopping:         famotidine (PEPCID) 20 MG tablet Comments:   Reason for Stopping:             Rationale for medication changes:    Had completed his course of ertapenem  Was only taking 10mg of torsemide at admission  ASA changed to bid postoperatively for DVT prophylaxis  Colchicine dose doubled due to acute gout flare  Consults   Infectious disease   Orthopedic surgery  Anticoagulation Information    N/a  SIGNIFICANT IMAGING FINDINGS     Results for orders placed  or performed during the hospital encounter of 08/04/24   US Upper Extremity Venous Duplex Right    Impression    IMPRESSION:  1.  No deep venous thrombosis in the right upper extremity where seen.   Chest XR,  PA & LAT    Impression    IMPRESSION: Mild right basilar atelectasis or pneumonia.   MR Shoulder Right w/o & w Contrast    Impression    IMPRESSION:  1.  Abnormal infectious or inflammatory arthritis at the right acromioclavicular joint with subacromial subdeltoid bursitis and bursal effusion. Subtle marrow signal abnormality is observed at the distal clavicle where there is degenerative change.   Whether this is reactive related to the chronic arthropathy or represents early osteomyelitis is not determined. The rotator cuff structures appear intact and there is no evidence of inflammation within the glenohumeral joint space at this time.   XR Knee Right 3 Views    Impression    IMPRESSION: Redemonstrated postoperative changes status post right knee arthroplasty with patellar resurfacing. No hardware complication. No acute fracture. Small joint effusion.   US Joint Injection Aspiration Major Right    Impression    IMPRESSION:  1. Ultrasound-guided right knee joint aspiration.     MR Cervical Spine w/o & w Contrast    Impression    IMPRESSION:    1.  No evidence for infectious process involving the cervical spine as clinically queried.  2.  Multilevel cervical spondylosis as detailed above.  3.  Spinal canal stenosis is greatest and advanced at C4-C5 and moderate to advanced at C3-C4.  4.  Neural foraminal stenosis is greatest and advanced on the right at C3-C4 and C5-C6 as well as on the left at C4-C5. There is also moderate to advanced left neural foraminal stenosis at C3-C4 and moderate right neural foraminal stenosis at C4-C5.  5.  No cord signal abnormality.     Echocardiogram Complete   Result Value Ref Range    LVEF  55-60%      SIGNIFICANT LABORATORY FINDINGS   See EMR  Joint fluid pos for crystals, neg  for bacterial growth  Discharge Orders        Primary Care - Care Coordination Referral      General info for SNF    Length of Stay Estimate: Short Term Care: Estimated # of Days <30  Condition at Discharge: Improving  Level of care:skilled   Rehabilitation Potential: Good  Admission H&P remains valid and up-to-date: Yes  Recent Chemotherapy: N/A  Use Nursing Home Standing Orders: Yes     Mantoux instructions    Give two-step Mantoux (PPD) Per Facility Policy Yes     Follow Up and recommended labs and tests    Follow up with longterm physician.  The following labs/tests are recommended: CBC, BMP, CRP.     Reason for your hospital stay    Severe gout flare involving multiple joints.     Glucose monitor nursing POCT    Before meals and at bedtime     Activity - Up with assistive device     Full Code     Physical Therapy Adult Consult    Evaluate and treat as clinically indicated.    Reason:  pain, polyarticular gout, trouble with ambulation     Occupational Therapy Adult Consult    Evaluate and treat as clinically indicated.    Reason:  pain, gout flare, shoulder surgery, trouble with ADLs     Diet    Follow this diet upon discharge: Orders Placed This Encounter      Snacks/Supplements Adult: Glucerna; Between Meals      Regular Diet Adult     Examination   Physical Exam   Temp:  [97.2  F (36.2  C)-98.2  F (36.8  C)] 97.9  F (36.6  C)  Pulse:  [] 79  Resp:  [16-18] 18  BP: (134-192)/(68-89) 144/73  SpO2:  [90 %-97 %] 93 %  Wt Readings from Last 1 Encounters:   08/04/24 86.6 kg (191 lb)       Subjective: feeling better. Pain and mobility better now. Able to move his knee now independently. Has a tender red area on the left great toe    Physical Exam:    Gen: no acute distress, comfortable  ENT: no scleral icterus  CV: regular rate and rhythm  MSK: no significant peripheral pitting edema, no noticeable swelling/erythema or warmth of joints. He is in a sling in the rt arm  Derm: warm, dry, not pale  Psych:  appropriate affect       Please see EMR for more detailed significant labs, imaging, consultant notes etc.    Rickey PHILLIPS DO, personally saw the patient today and spent greater than 30 minutes discharging this patient.    Rickey Hinton DO  [facility]    CC:Rickey Adamson

## 2024-08-18 ENCOUNTER — DOCUMENTATION ONLY (OUTPATIENT)
Dept: GERIATRICS | Facility: CLINIC | Age: 79
End: 2024-08-18
Payer: COMMERCIAL

## 2024-08-19 ENCOUNTER — TRANSITIONAL CARE UNIT VISIT (OUTPATIENT)
Dept: GERIATRICS | Facility: CLINIC | Age: 79
End: 2024-08-19
Payer: COMMERCIAL

## 2024-08-19 ENCOUNTER — LAB REQUISITION (OUTPATIENT)
Dept: LAB | Facility: CLINIC | Age: 79
End: 2024-08-19
Payer: COMMERCIAL

## 2024-08-19 VITALS
DIASTOLIC BLOOD PRESSURE: 77 MMHG | WEIGHT: 191 LBS | HEART RATE: 91 BPM | BODY MASS INDEX: 25.87 KG/M2 | HEIGHT: 72 IN | SYSTOLIC BLOOD PRESSURE: 144 MMHG | TEMPERATURE: 98.6 F | RESPIRATION RATE: 20 BRPM | OXYGEN SATURATION: 98 %

## 2024-08-19 DIAGNOSIS — R52 PAIN MANAGEMENT: ICD-10-CM

## 2024-08-19 DIAGNOSIS — M1A.00X0 IDIOPATHIC CHRONIC GOUT WITHOUT TOPHUS, UNSPECIFIED SITE: Primary | ICD-10-CM

## 2024-08-19 DIAGNOSIS — R53.81 PHYSICAL DECONDITIONING: ICD-10-CM

## 2024-08-19 DIAGNOSIS — Z51.81 ENCOUNTER FOR THERAPEUTIC DRUG LEVEL MONITORING: ICD-10-CM

## 2024-08-19 PROBLEM — G06.2 EXTRADURAL AND SUBDURAL ABSCESS, UNSPECIFIED: Status: ACTIVE | Noted: 2024-06-25

## 2024-08-19 PROBLEM — D64.9 ANEMIA, UNSPECIFIED: Status: ACTIVE | Noted: 2024-06-25

## 2024-08-19 PROBLEM — N18.1 CHRONIC KIDNEY DISEASE, STAGE 1: Status: ACTIVE | Noted: 2024-06-25

## 2024-08-19 PROBLEM — M48.00 SPINAL STENOSIS, SITE UNSPECIFIED: Status: ACTIVE | Noted: 2024-06-25

## 2024-08-19 PROCEDURE — 99310 SBSQ NF CARE HIGH MDM 45: CPT | Performed by: NURSE PRACTITIONER

## 2024-08-19 NOTE — LETTER
8/19/2024      Lance Ibrahim  [address]  [facility] [id number]        [facility] GERIATRIC SERVICES  Chief Complaint   Patient presents with     [facility] F/U     [place] Medical Record Number:  [id number]  Place of Service where encounter took place:  [facility] ([facility]) [[id number]]  Code Status:  unknown     HISTORY:      HPI:  Lance Ibrahim  is 79 year old (1945) undergoing physical and occupational therapy.    He is with past medical history significant for HFpEF, diabetes, gout,  Excerpted from records recent hospitalization 5/2024 for urosepsis and bacteremia due to Klebsiella. Eventually diagnosed with L4-L5 discitis and 6/19.  Was placed on IV antibiotic with vancomycin and ertapenem. He presented to the hospital on 8/4/2024 with acute right shoulder pain which progressed to involve the right elbow, right thumb.  Working diagnosis was initially gout attack versus septic arthritis. MRI of the right shoulder showed signs of right acromioclavicular inflammation with subdeltoid bursitis, possible early osteomyelitis. Evaluated by orthopedic surgery. Plans were for I&D of the right shoulder and right elbow on 8/7.       Status post Irrigation and debridement of the right AC joint, right olecranon bursa, right elbow joint, right thumb MCP joint, Open distal clavicle excision, right, on 8/7/24, with EBL of 50ccs by Dr. Hanson. Also status post ultrasound with aspiration of right knee. Pt was treated with both IV antibiotics and colchicine daily.  MRI Cervical-spine 8/10 without any acute findings, though spondylosis and stenosis were noted. Anticoagulation plan is aspirin twice daily for 42 days.       Colchicine was increased to twice daily, and ultimately decision made to monitor off of antibiotics, vancomycin discontinued on 8/12.  The patient started to have clinical improvement, on 8/15 for the first time he was able to have improved range of motion in his lower limbs, discussed with  infectious disease and given clinic appointment could likely avoid starting steroids for gout (would start allopurinol after flare-up; likely for outpatient start).       Continued to improve off of steroids on higher dose of colchicine. He did not require steroids. When his acute flare of gout has resolved he should be started on allopurinol or other antigout agent.     Today he is seen to review VS, labs, and a routine visit.  As Stated above he was recovering in the TCU when he was sent to the ER on 8/4/24 with right shoulder pain which progressed to right elbow and thumb.   He was seen at the bedside and noted to have sutures in all three areas, No redness or drainage noted. He reported his sutures can come out on the 21st however there were no orders for removal. Staff will follow up with ID. He was diagnosed with COVID on 7/18/2024.  He is out of isolation and asymptomatic.   Denies shortness of breath.   He was alert and oriented x 4.   He is with a spinal cord stimulator right lower back in which she reports he charges weekly.  He is no longer on antibiotics, Labs being monitored.    A1c 2/20/2024 8.0.  Right knee without redness, He will undergo left knee surgery on  September 16, 2024.  He reports his pain is controlled. He is with right heel wound and dressing change orders are in place.     ALLERGIES:Ancef [cefazolin], Cephalexin, Penicillins, and Sulfa antibiotics    PAST MEDICAL HISTORY:   Past Medical History:   Diagnosis Date     Anemia      Arthritis     osteoarthritis knees     BPH      CAD (coronary artery disease)     subtotal occlusion in the small distal LAD      Cardiomyopathy      Cerebral artery occlusion with cerebral infarction     TIA 1993, no residual     Cervicalgia      CHF (congestive heart failure) (H)      Chronic kidney disease      Chronic low back pain      Chronic rhinitis      Gouty arthropathy     hands     Hyperlipidemia      Hypertension      Kidney stone      Nocturia       Obesity      Osteomyelitis (H) 08/2023    Foot     PVD (peripheral vascular disease)      Sleep apnea     doesn't use cpap     TIA (transient ischaemic attack) 1993     Type 2 diabetes mellitus - 11/08/2018     Ureteral stone        PAST SURGICAL HISTORY:   has a past surgical history that includes Diastasis recti repair (1985); Ventral hernia repair NOS (1987); ORIF Shoulder (Left); Colonoscopy (03/09/2013); hernia repair (07/13/2004); Foot surgery (04/2013); Amputate toe(s) (Left, 10/15/2018); Arthroplasty knee (Right, 12/07/2018); Heart Catheterization with Possible Intervention (Left, 03/05/2019); Extracapsular cataract extration with intraocular lens implant (Left, 03/13/2017); Extracapsular cataract extration with intraocular lens implant (Right); Amputate foot (Right, 08/07/2023); Esophagoscopy, gastroscopy, duodenoscopy (EGD), combined (N/A, 04/30/2024); Endoscopic retrograde cholangiopancreatogram (N/A, 04/30/2024); Spinal Cord Stimulator Implant (04/03/2024); Prostate surgery (02/2024); IR Disc Aspriation Injection (6/19/2024); Irrigation and debridement shoulder, combined (Right, 8/7/2024); Resect clavicle distal (Right, 8/7/2024); and Irrigation and debridement upper extremity, combined (Right, 8/7/2024).    FAMILY HISTORY: family history includes Alcohol/Drug in his paternal grandfather; Cancer in his father; Diabetes in his maternal grandfather; Family History Negative in his mother.    SOCIAL HISTORY:  reports that he quit smoking about 31 years ago. His smoking use included cigarettes. He has never been exposed to tobacco smoke. He has never used smokeless tobacco. He reports that he does not currently use alcohol. He reports that he does not use drugs.    ROS:  Constitutional: Negative for activity change, appetite change, fatigue and fever.   HENT: Negative for congestion.    Respiratory: Negative for cough, shortness of breath and wheezing.    Cardiovascular: Negative for chest pain and leg  swelling.   Gastrointestinal: Negative for abdominal distention, abdominal pain, constipation, diarrhea and nausea.   Genitourinary: Negative for dysuria.   Musculoskeletal: positive  for arthralgia. Positive  for back pain.   Skin: Negative for color change and wound.  No longer has a PICC LINE  Sutures right shoulder, elbow and thumb.   Neurological: Negative for dizziness.   Psychiatric/Behavioral: Negative for agitation, behavioral problems and confusion.     Physical Exam:  Constitutional:       Appearance: Patient is well-developed.   HENT:      Head: Normocephalic.   Eyes:      Conjunctiva/sclera: Conjunctivae normal.   Neck:      Musculoskeletal: Normal range of motion.   Cardiovascular:      Rate and Rhythm: Normal rate and regular rhythm.      Heart sounds: Normal heart sounds. No murmur.   Pulmonary:      Effort: No respiratory distress.      Breath sounds: Normal breath sounds.   no wheezing or rales.   Abdominal:      General: Bowel sounds are normal. There is no distension.      Palpations: Abdomen is soft.      Tenderness: There is no abdominal tenderness.   Musculoskeletal:       Normal range of motion.  decreased ROM LUE and LLE scheduled for left knee surgery September.  Skin:General:        Skin is warm. Sutures right shoulder, elbow and thumb  Neurological:         Mental Status: Patient is alert and oriented to person, place, and time.   Psychiatric:         Behavior: Behavior normal.     Vitals:BP (!) 144/77   Pulse 91   Temp 98.6  F (37  C)   Resp 20   Ht 1.829 m (6')   Wt 86.6 kg (191 lb)   SpO2 98%   BMI 25.90 kg/m   and Body mass index is 25.9 kg/m .    Lab/Diagnostic data:   Recent Results (from the past 240 hour(s))   Glucose by meter    Collection Time: 08/09/24 10:21 PM   Result Value Ref Range    GLUCOSE BY METER POCT 145 (H) 70 - 99 mg/dL   Glucose by meter    Collection Time: 08/10/24  6:11 AM   Result Value Ref Range    GLUCOSE BY METER POCT 118 (H) 70 - 99 mg/dL   CBC with  platelets    Collection Time: 08/10/24  7:31 AM   Result Value Ref Range    WBC Count 8.5 4.0 - 11.0 10e3/uL    RBC Count 3.27 (L) 4.40 - 5.90 10e6/uL    Hemoglobin 8.8 (L) 13.3 - 17.7 g/dL    Hematocrit 28.7 (L) 40.0 - 53.0 %    MCV 88 78 - 100 fL    MCH 26.9 26.5 - 33.0 pg    MCHC 30.7 (L) 31.5 - 36.5 g/dL    RDW 15.9 (H) 10.0 - 15.0 %    Platelet Count 389 150 - 450 10e3/uL   Extra Green Top (Lithium Heparin) Tube    Collection Time: 08/10/24  7:31 AM   Result Value Ref Range    Hold Specimen JIC    CRP inflammation    Collection Time: 08/10/24  7:31 AM   Result Value Ref Range    CRP Inflammation 88.00 (H) <5.00 mg/L   Creatinine    Collection Time: 08/10/24  7:31 AM   Result Value Ref Range    Creatinine 0.95 0.67 - 1.17 mg/dL    GFR Estimate 81 >60 mL/min/1.73m2   Glucose by meter    Collection Time: 08/10/24 12:42 PM   Result Value Ref Range    GLUCOSE BY METER POCT 165 (H) 70 - 99 mg/dL   Glucose by meter    Collection Time: 08/10/24  5:52 PM   Result Value Ref Range    GLUCOSE BY METER POCT 169 (H) 70 - 99 mg/dL   Glucose by meter    Collection Time: 08/10/24  9:17 PM   Result Value Ref Range    GLUCOSE BY METER POCT 197 (H) 70 - 99 mg/dL   Glucose by meter    Collection Time: 08/11/24  5:56 AM   Result Value Ref Range    GLUCOSE BY METER POCT 123 (H) 70 - 99 mg/dL   Vancomycin level    Collection Time: 08/11/24  6:27 AM   Result Value Ref Range    Vancomycin 23.7   ug/mL   Extra Green Top (Lithium Heparin) Tube    Collection Time: 08/11/24  6:27 AM   Result Value Ref Range    Hold Specimen JIC    Extra Purple Top Tube    Collection Time: 08/11/24  6:27 AM   Result Value Ref Range    Hold Specimen JIC    Glucose by meter    Collection Time: 08/11/24 11:18 AM   Result Value Ref Range    GLUCOSE BY METER POCT 158 (H) 70 - 99 mg/dL   Glucose by meter    Collection Time: 08/11/24  4:49 PM   Result Value Ref Range    GLUCOSE BY METER POCT 249 (H) 70 - 99 mg/dL   Glucose by meter    Collection Time: 08/11/24  10:04 PM   Result Value Ref Range    GLUCOSE BY METER POCT 206 (H) 70 - 99 mg/dL   Basic metabolic panel    Collection Time: 08/12/24  4:42 AM   Result Value Ref Range    Sodium 140 135 - 145 mmol/L    Potassium 3.9 3.4 - 5.3 mmol/L    Chloride 104 98 - 107 mmol/L    Carbon Dioxide (CO2) 29 22 - 29 mmol/L    Anion Gap 7 7 - 15 mmol/L    Urea Nitrogen 18.3 8.0 - 23.0 mg/dL    Creatinine 1.06 0.67 - 1.17 mg/dL    GFR Estimate 71 >60 mL/min/1.73m2    Calcium 8.8 8.8 - 10.4 mg/dL    Glucose 151 (H) 70 - 99 mg/dL   Hemoglobin    Collection Time: 08/12/24  4:42 AM   Result Value Ref Range    Hemoglobin 8.6 (L) 13.3 - 17.7 g/dL   Glucose by meter    Collection Time: 08/12/24  6:21 AM   Result Value Ref Range    GLUCOSE BY METER POCT 129 (H) 70 - 99 mg/dL   Glucose by meter    Collection Time: 08/12/24 11:41 AM   Result Value Ref Range    GLUCOSE BY METER POCT 292 (H) 70 - 99 mg/dL   Echocardiogram Complete    Collection Time: 08/12/24  1:40 PM   Result Value Ref Range    LVEF  55-60%    Glucose by meter    Collection Time: 08/12/24  5:01 PM   Result Value Ref Range    GLUCOSE BY METER POCT 200 (H) 70 - 99 mg/dL   Glucose by meter    Collection Time: 08/12/24  9:13 PM   Result Value Ref Range    GLUCOSE BY METER POCT 188 (H) 70 - 99 mg/dL   Glucose by meter    Collection Time: 08/13/24  2:21 AM   Result Value Ref Range    GLUCOSE BY METER POCT 146 (H) 70 - 99 mg/dL   Glucose by meter    Collection Time: 08/13/24  4:10 AM   Result Value Ref Range    GLUCOSE BY METER POCT 140 (H) 70 - 99 mg/dL   Glucose by meter    Collection Time: 08/13/24 11:49 AM   Result Value Ref Range    GLUCOSE BY METER POCT 165 (H) 70 - 99 mg/dL   CRP inflammation    Collection Time: 08/13/24  1:28 PM   Result Value Ref Range    CRP Inflammation 53.20 (H) <5.00 mg/L   Glucose by meter    Collection Time: 08/13/24  5:30 PM   Result Value Ref Range    GLUCOSE BY METER POCT 209 (H) 70 - 99 mg/dL   Glucose by meter    Collection Time: 08/13/24  9:32 PM    Result Value Ref Range    GLUCOSE BY METER POCT 224 (H) 70 - 99 mg/dL   Glucose by meter    Collection Time: 08/14/24  2:06 AM   Result Value Ref Range    GLUCOSE BY METER POCT 155 (H) 70 - 99 mg/dL   CRP inflammation    Collection Time: 08/14/24  6:25 AM   Result Value Ref Range    CRP Inflammation 34.70 (H) <5.00 mg/L   Extra Green Top (Lithium Heparin) Tube    Collection Time: 08/14/24  6:25 AM   Result Value Ref Range    Hold Specimen JIC    Glucose by meter    Collection Time: 08/14/24  6:50 AM   Result Value Ref Range    GLUCOSE BY METER POCT 150 (H) 70 - 99 mg/dL   Glucose by meter    Collection Time: 08/14/24 11:31 AM   Result Value Ref Range    GLUCOSE BY METER POCT 230 (H) 70 - 99 mg/dL   Glucose by meter    Collection Time: 08/14/24  4:33 PM   Result Value Ref Range    GLUCOSE BY METER POCT 240 (H) 70 - 99 mg/dL   Glucose by meter    Collection Time: 08/14/24  5:37 PM   Result Value Ref Range    GLUCOSE BY METER POCT 201 (H) 70 - 99 mg/dL   Glucose by meter    Collection Time: 08/14/24  9:22 PM   Result Value Ref Range    GLUCOSE BY METER POCT 230 (H) 70 - 99 mg/dL   Glucose by meter    Collection Time: 08/15/24  1:57 AM   Result Value Ref Range    GLUCOSE BY METER POCT 156 (H) 70 - 99 mg/dL   CRP inflammation    Collection Time: 08/15/24  4:27 AM   Result Value Ref Range    CRP Inflammation 20.90 (H) <5.00 mg/L   Glucose by meter    Collection Time: 08/15/24  8:20 AM   Result Value Ref Range    GLUCOSE BY METER POCT 150 (H) 70 - 99 mg/dL   Glucose by meter    Collection Time: 08/15/24 12:11 PM   Result Value Ref Range    GLUCOSE BY METER POCT 202 (H) 70 - 99 mg/dL   Glucose by meter    Collection Time: 08/15/24  4:47 PM   Result Value Ref Range    GLUCOSE BY METER POCT 163 (H) 70 - 99 mg/dL   Glucose by meter    Collection Time: 08/15/24  9:10 PM   Result Value Ref Range    GLUCOSE BY METER POCT 272 (H) 70 - 99 mg/dL   CRP inflammation    Collection Time: 08/16/24  6:37 AM   Result Value Ref Range     CRP Inflammation 11.10 (H) <5.00 mg/L   Extra Purple Top Tube    Collection Time: 08/16/24  6:37 AM   Result Value Ref Range    Hold Specimen JIC    Glucose by meter    Collection Time: 08/16/24  8:02 AM   Result Value Ref Range    GLUCOSE BY METER POCT 196 (H) 70 - 99 mg/dL   Glucose by meter    Collection Time: 08/16/24 11:26 AM   Result Value Ref Range    GLUCOSE BY METER POCT 228 (H) 70 - 99 mg/dL   Glucose by meter    Collection Time: 08/16/24  4:11 PM   Result Value Ref Range    GLUCOSE BY METER POCT 148 (H) 70 - 99 mg/dL        MEDICATIONS:  MED REC REQUIRED  Post Medication Reconciliation Status: discharge medications reconciled, continue medications without change          Review of your medicines            Accurate as of August 19, 2024  8:43 PM. If you have any questions, ask your nurse or doctor.                CONTINUE these medicines which have NOT CHANGED        Dose / Directions   acetaminophen 325 MG tablet  Commonly known as: TYLENOL  Used for: Acute right-sided low back pain with right-sided sciatica      Dose: 650 mg  Take 2 tablets (650 mg) by mouth every 6 hours as needed for pain (and adjunct with moderate or severe pain or per patient request)  Refills: 0     aspirin 81 MG EC tablet  Used for: Coronary artery disease involving native coronary artery of native heart without angina pectoris      Dose: 81 mg  Take 1 tablet (81 mg) by mouth 2 times daily for 42 days Then return to once daily .  Refills: 0     colchicine 0.6 MG tablet  Commonly known as: COLCRYS  Used for: Idiopathic chronic gout of multiple sites without tophus      Dose: 0.6 mg  Take 1 tablet (0.6 mg) by mouth 2 times daily  Refills: 0     diclofenac 1 % topical gel  Commonly known as: VOLTAREN      Dose: 4 g  Apply 4 g topically 4 times daily as needed for moderate pain Left knee  Refills: 0     ferrous gluconate 324 (38 Fe) MG tablet  Commonly known as: FERGON  Used for: Iron deficiency anemia, unspecified iron deficiency  anemia type      Dose: 324 mg  Take 1 tablet (324 mg) by mouth daily  Refills: 0     finasteride 5 MG tablet  Commonly known as: PROSCAR      Dose: 5 mg  Take 5 mg by mouth daily  Refills: 0     fluticasone 50 MCG/ACT nasal spray  Commonly known as: FLONASE      Dose: 1-2 spray  Spray 1-2 sprays in nostril daily as needed  Refills: 0     gabapentin 300 MG capsule  Commonly known as: NEURONTIN      Dose: 300 mg  Take 300 mg by mouth 3 times daily  Refills: 0     glipiZIDE 2.5 MG 24 hr tablet  Commonly known as: GLUCOTROL XL      Dose: 2.5 mg  Take 2.5 mg by mouth daily  Refills: 0     insulin glargine 100 UNIT/ML pen  Commonly known as: LANTUS PEN  Used for: 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  Refills: 0     lactobacillus rhamnosus (GG) capsule      Dose: 2 capsule  Take 2 capsules by mouth daily Noon  Refills: 0     Lidocaine 4 % Patch  Commonly known as: LIDOCARE      Dose: 1 patch  Place 1 patch onto the skin every 24 hours To prevent lidocaine toxicity, patient should be patch free for 12 hrs daily.  Refills: 0     lisinopril 10 MG tablet  Commonly known as: ZESTRIL  Used for: Chronic diastolic heart failure (H)      Dose: 10 mg  Take 1 tablet (10 mg) by mouth every 24 hours  Quantity: 90 tablet  Refills: 3     loperamide 2 MG tablet  Commonly known as: IMODIUM A-D      Dose: 2 mg  Take 2 mg by mouth 4 times daily as needed for diarrhea  Refills: 0     methocarbamol 500 MG tablet  Commonly known as: ROBAXIN      Dose: 500 mg  Take 500 mg by mouth every 8 hours as needed for muscle spasms  Refills: 0     nortriptyline 10 MG capsule  Commonly known as: PAMELOR      Dose: 20 mg  Take 20 mg by mouth nightly as needed  Refills: 0      oxyCODONE 5 MG tablet  Commonly known as: ROXICODONE  Used for: Acute right-sided low back pain with right-sided sciatica      Dose: 2.5-5 mg  Take 0.5-1 tablets (2.5-5 mg) by mouth every 6 hours as needed for moderate to severe pain (2.5 mg for moderate pain, 5 mg for severe pain.)  Quantity: 10 tablet  Refills: 0     polyethylene glycol-propylene glycol 0.4-0.3 % Soln ophthalmic solution  Commonly known as: SYSTANE ULTRA      Dose: 1 drop  1 drop daily  Refills: 0     Semaglutide 14 MG tablet  Commonly known as: RYBELSUS  Used for: Type 2 diabetes, HbA1c goal < 7% (H)      Dose: 14 mg  Take 14 mg by mouth daily  Quantity: 30 tablet  Refills: 3     sodium bicarbonate 650 MG tablet  Used for: Acidosis      Dose: 1,950 mg  Take 3 tablets (1,950 mg) by mouth 3 times daily  Quantity: 180 tablet  Refills: 1     sodium chloride 0.9 % infusion      Dose: 10 mL  Inject 10 mLs into the vein continuously 24 hours before and after antibiotics  Refills: 0     torsemide 10 MG tablet  Commonly known as: DEMADEX  Used for: Essential hypertension      Dose: 10 mg  Take 1 tablet (10 mg) by mouth daily  Refills: 0     Tums 500 MG chewable tablet  Generic drug: calcium carbonate      Dose: 1,000 mg  Take 1,000 mg by mouth 3 times daily as needed for heartburn  Refills: 0     ursodiol 300 MG capsule  Commonly known as: ACTIGALL  Used for: Biliary stricture (H28)      Dose: 300 mg  Take 1 capsule (300 mg) by mouth 2 times daily  Quantity: 60 capsule  Refills: 0              ASSESSMENT/PLAN  Encounter Diagnoses   Name Primary?     Idiopathic chronic gout without tophus, unspecified site Yes     Physical deconditioning      Pain management      I/D right shoulder, elbow and thumb, monitor labs sutures in place staff to follow up to see if sutures can be removed. Follow up with ID     HX Diarrhea on Imodium 4 times daily as needed- resolved     Pain management as needed Tylenol, as needed oxycodone, diclofenac gel, lidocaine patch,  methocarbamol,  spinal cord stimulator, Gabapentin     Mood disorder on nortriptyline     Physical deconditioning  PT/OT    Gout on colchicine    Bone infection completed antibiotics  follow-up with infectious disease and neurosurgery,     Diabetes type 2 continue glipizide XL 2.5 mg daily, glargine 8 units subcu twice daily, semaglutide A1C 6/18/24 was 8.0    Hypertension on torsemide,  10 mg po daily  continue lisinopril    Electronically signed by: Darling Valdivia CNP       Sincerely,        Darling Valdivia CNP

## 2024-08-19 NOTE — PROGRESS NOTES
[facility] GERIATRIC SERVICES  Chief Complaint   Patient presents with    [facility] F/U     [facility] Medical Record Number:  [id number]  Place of Service where encounter took place:  [facility] ([facility]) [[id number]]  Code Status:  unknown     HISTORY:      HPI:  Lance Ibrahim  is 79 year old (1945) undergoing physical and occupational therapy.    He is with past medical history significant for HFpEF, diabetes, gout,  Excerpted from records recent hospitalization 5/2024 for urosepsis and bacteremia due to Klebsiella. Eventually diagnosed with L4-L5 discitis and 6/19.  Was placed on IV antibiotic with vancomycin and ertapenem. He presented to the hospital on 8/4/2024 with acute right shoulder pain which progressed to involve the right elbow, right thumb.  Working diagnosis was initially gout attack versus septic arthritis. MRI of the right shoulder showed signs of right acromioclavicular inflammation with subdeltoid bursitis, possible early osteomyelitis. Evaluated by orthopedic surgery. Plans were for I&D of the right shoulder and right elbow on 8/7.       Status post Irrigation and debridement of the right AC joint, right olecranon bursa, right elbow joint, right thumb MCP joint, Open distal clavicle excision, right, on 8/7/24, with EBL of 50ccs by Dr. Hanson. Also status post ultrasound with aspiration of right knee. Pt was treated with both IV antibiotics and colchicine daily.  MRI Cervical-spine 8/10 without any acute findings, though spondylosis and stenosis were noted. Anticoagulation plan is aspirin twice daily for 42 days.       Colchicine was increased to twice daily, and ultimately decision made to monitor off of antibiotics, vancomycin discontinued on 8/12.  The patient started to have clinical improvement, on 8/15 for the first time he was able to have improved range of motion in his lower limbs, discussed with infectious disease and given clinic appointment could likely avoid starting  steroids for gout (would start allopurinol after flare-up; likely for outpatient start).       Continued to improve off of steroids on higher dose of colchicine. He did not require steroids. When his acute flare of gout has resolved he should be started on allopurinol or other antigout agent.     Today he is seen to review VS, labs, and a routine visit.  As Stated above he was recovering in the TCU when he was sent to the ER on 8/4/24 with right shoulder pain which progressed to right elbow and thumb.   He was seen at the bedside and noted to have sutures in all three areas, No redness or drainage noted. He reported his sutures can come out on the 21st however there were no orders for removal. Staff will follow up with ID. He was diagnosed with COVID on 7/18/2024.  He is out of isolation and asymptomatic.   Denies shortness of breath.   He was alert and oriented x 4.   He is with a spinal cord stimulator right lower back in which she reports he charges weekly.  He is no longer on antibiotics, Labs being monitored.    A1c 2/20/2024 8.0.  Right knee without redness, He will undergo left knee surgery on  September 16, 2024.  He reports his pain is controlled. He is with right heel wound and dressing change orders are in place.     ALLERGIES:Ancef [cefazolin], Cephalexin, Penicillins, and Sulfa antibiotics    PAST MEDICAL HISTORY:   Past Medical History:   Diagnosis Date    Anemia     Arthritis     osteoarthritis knees    BPH     CAD (coronary artery disease)     subtotal occlusion in the small distal LAD     Cardiomyopathy     Cerebral artery occlusion with cerebral infarction     TIA 1993, no residual    Cervicalgia     CHF (congestive heart failure) (H)     Chronic kidney disease     Chronic low back pain     Chronic rhinitis     Gouty arthropathy     hands    Hyperlipidemia     Hypertension     Kidney stone     Nocturia     Obesity     Osteomyelitis (H) 08/2023    Foot    PVD (peripheral vascular disease)     Sleep  apnea     doesn't use cpap    TIA (transient ischaemic attack) 1993    Type 2 diabetes mellitus - 11/08/2018    Ureteral stone        PAST SURGICAL HISTORY:   has a past surgical history that includes Diastasis recti repair (1985); Ventral hernia repair NOS (1987); ORIF Shoulder (Left); Colonoscopy (03/09/2013); hernia repair (07/13/2004); Foot surgery (04/2013); Amputate toe(s) (Left, 10/15/2018); Arthroplasty knee (Right, 12/07/2018); Heart Catheterization with Possible Intervention (Left, 03/05/2019); Extracapsular cataract extration with intraocular lens implant (Left, 03/13/2017); Extracapsular cataract extration with intraocular lens implant (Right); Amputate foot (Right, 08/07/2023); Esophagoscopy, gastroscopy, duodenoscopy (EGD), combined (N/A, 04/30/2024); Endoscopic retrograde cholangiopancreatogram (N/A, 04/30/2024); Spinal Cord Stimulator Implant (04/03/2024); Prostate surgery (02/2024); IR Disc Aspriation Injection (6/19/2024); Irrigation and debridement shoulder, combined (Right, 8/7/2024); Resect clavicle distal (Right, 8/7/2024); and Irrigation and debridement upper extremity, combined (Right, 8/7/2024).    FAMILY HISTORY: family history includes Alcohol/Drug in his paternal grandfather; Cancer in his father; Diabetes in his maternal grandfather; Family History Negative in his mother.    SOCIAL HISTORY:  reports that he quit smoking about 31 years ago. His smoking use included cigarettes. He has never been exposed to tobacco smoke. He has never used smokeless tobacco. He reports that he does not currently use alcohol. He reports that he does not use drugs.    ROS:  Constitutional: Negative for activity change, appetite change, fatigue and fever.   HENT: Negative for congestion.    Respiratory: Negative for cough, shortness of breath and wheezing.    Cardiovascular: Negative for chest pain and leg swelling.   Gastrointestinal: Negative for abdominal distention, abdominal pain, constipation, diarrhea  and nausea.   Genitourinary: Negative for dysuria.   Musculoskeletal: positive  for arthralgia. Positive  for back pain.   Skin: Negative for color change and wound.  No longer has a PICC LINE  Sutures right shoulder, elbow and thumb.   Neurological: Negative for dizziness.   Psychiatric/Behavioral: Negative for agitation, behavioral problems and confusion.     Physical Exam:  Constitutional:       Appearance: Patient is well-developed.   HENT:      Head: Normocephalic.   Eyes:      Conjunctiva/sclera: Conjunctivae normal.   Neck:      Musculoskeletal: Normal range of motion.   Cardiovascular:      Rate and Rhythm: Normal rate and regular rhythm.      Heart sounds: Normal heart sounds. No murmur.   Pulmonary:      Effort: No respiratory distress.      Breath sounds: Normal breath sounds.   no wheezing or rales.   Abdominal:      General: Bowel sounds are normal. There is no distension.      Palpations: Abdomen is soft.      Tenderness: There is no abdominal tenderness.   Musculoskeletal:       Normal range of motion.  decreased ROM LUE and LLE scheduled for left knee surgery September.  Skin:General:        Skin is warm. Sutures right shoulder, elbow and thumb  Neurological:         Mental Status: Patient is alert and oriented to person, place, and time.   Psychiatric:         Behavior: Behavior normal.     Vitals:BP (!) 144/77   Pulse 91   Temp 98.6  F (37  C)   Resp 20   Ht 1.829 m (6')   Wt 86.6 kg (191 lb)   SpO2 98%   BMI 25.90 kg/m   and Body mass index is 25.9 kg/m .    Lab/Diagnostic data:   Recent Results (from the past 240 hour(s))   Glucose by meter    Collection Time: 08/09/24 10:21 PM   Result Value Ref Range    GLUCOSE BY METER POCT 145 (H) 70 - 99 mg/dL   Glucose by meter    Collection Time: 08/10/24  6:11 AM   Result Value Ref Range    GLUCOSE BY METER POCT 118 (H) 70 - 99 mg/dL   CBC with platelets    Collection Time: 08/10/24  7:31 AM   Result Value Ref Range    WBC Count 8.5 4.0 - 11.0  10e3/uL    RBC Count 3.27 (L) 4.40 - 5.90 10e6/uL    Hemoglobin 8.8 (L) 13.3 - 17.7 g/dL    Hematocrit 28.7 (L) 40.0 - 53.0 %    MCV 88 78 - 100 fL    MCH 26.9 26.5 - 33.0 pg    MCHC 30.7 (L) 31.5 - 36.5 g/dL    RDW 15.9 (H) 10.0 - 15.0 %    Platelet Count 389 150 - 450 10e3/uL   Extra Green Top (Lithium Heparin) Tube    Collection Time: 08/10/24  7:31 AM   Result Value Ref Range    Hold Specimen JIC    CRP inflammation    Collection Time: 08/10/24  7:31 AM   Result Value Ref Range    CRP Inflammation 88.00 (H) <5.00 mg/L   Creatinine    Collection Time: 08/10/24  7:31 AM   Result Value Ref Range    Creatinine 0.95 0.67 - 1.17 mg/dL    GFR Estimate 81 >60 mL/min/1.73m2   Glucose by meter    Collection Time: 08/10/24 12:42 PM   Result Value Ref Range    GLUCOSE BY METER POCT 165 (H) 70 - 99 mg/dL   Glucose by meter    Collection Time: 08/10/24  5:52 PM   Result Value Ref Range    GLUCOSE BY METER POCT 169 (H) 70 - 99 mg/dL   Glucose by meter    Collection Time: 08/10/24  9:17 PM   Result Value Ref Range    GLUCOSE BY METER POCT 197 (H) 70 - 99 mg/dL   Glucose by meter    Collection Time: 08/11/24  5:56 AM   Result Value Ref Range    GLUCOSE BY METER POCT 123 (H) 70 - 99 mg/dL   Vancomycin level    Collection Time: 08/11/24  6:27 AM   Result Value Ref Range    Vancomycin 23.7   ug/mL   Extra Green Top (Lithium Heparin) Tube    Collection Time: 08/11/24  6:27 AM   Result Value Ref Range    Hold Specimen JIC    Extra Purple Top Tube    Collection Time: 08/11/24  6:27 AM   Result Value Ref Range    Hold Specimen JIC    Glucose by meter    Collection Time: 08/11/24 11:18 AM   Result Value Ref Range    GLUCOSE BY METER POCT 158 (H) 70 - 99 mg/dL   Glucose by meter    Collection Time: 08/11/24  4:49 PM   Result Value Ref Range    GLUCOSE BY METER POCT 249 (H) 70 - 99 mg/dL   Glucose by meter    Collection Time: 08/11/24 10:04 PM   Result Value Ref Range    GLUCOSE BY METER POCT 206 (H) 70 - 99 mg/dL   Basic metabolic panel     Collection Time: 08/12/24  4:42 AM   Result Value Ref Range    Sodium 140 135 - 145 mmol/L    Potassium 3.9 3.4 - 5.3 mmol/L    Chloride 104 98 - 107 mmol/L    Carbon Dioxide (CO2) 29 22 - 29 mmol/L    Anion Gap 7 7 - 15 mmol/L    Urea Nitrogen 18.3 8.0 - 23.0 mg/dL    Creatinine 1.06 0.67 - 1.17 mg/dL    GFR Estimate 71 >60 mL/min/1.73m2    Calcium 8.8 8.8 - 10.4 mg/dL    Glucose 151 (H) 70 - 99 mg/dL   Hemoglobin    Collection Time: 08/12/24  4:42 AM   Result Value Ref Range    Hemoglobin 8.6 (L) 13.3 - 17.7 g/dL   Glucose by meter    Collection Time: 08/12/24  6:21 AM   Result Value Ref Range    GLUCOSE BY METER POCT 129 (H) 70 - 99 mg/dL   Glucose by meter    Collection Time: 08/12/24 11:41 AM   Result Value Ref Range    GLUCOSE BY METER POCT 292 (H) 70 - 99 mg/dL   Echocardiogram Complete    Collection Time: 08/12/24  1:40 PM   Result Value Ref Range    LVEF  55-60%    Glucose by meter    Collection Time: 08/12/24  5:01 PM   Result Value Ref Range    GLUCOSE BY METER POCT 200 (H) 70 - 99 mg/dL   Glucose by meter    Collection Time: 08/12/24  9:13 PM   Result Value Ref Range    GLUCOSE BY METER POCT 188 (H) 70 - 99 mg/dL   Glucose by meter    Collection Time: 08/13/24  2:21 AM   Result Value Ref Range    GLUCOSE BY METER POCT 146 (H) 70 - 99 mg/dL   Glucose by meter    Collection Time: 08/13/24  4:10 AM   Result Value Ref Range    GLUCOSE BY METER POCT 140 (H) 70 - 99 mg/dL   Glucose by meter    Collection Time: 08/13/24 11:49 AM   Result Value Ref Range    GLUCOSE BY METER POCT 165 (H) 70 - 99 mg/dL   CRP inflammation    Collection Time: 08/13/24  1:28 PM   Result Value Ref Range    CRP Inflammation 53.20 (H) <5.00 mg/L   Glucose by meter    Collection Time: 08/13/24  5:30 PM   Result Value Ref Range    GLUCOSE BY METER POCT 209 (H) 70 - 99 mg/dL   Glucose by meter    Collection Time: 08/13/24  9:32 PM   Result Value Ref Range    GLUCOSE BY METER POCT 224 (H) 70 - 99 mg/dL   Glucose by meter    Collection  Time: 08/14/24  2:06 AM   Result Value Ref Range    GLUCOSE BY METER POCT 155 (H) 70 - 99 mg/dL   CRP inflammation    Collection Time: 08/14/24  6:25 AM   Result Value Ref Range    CRP Inflammation 34.70 (H) <5.00 mg/L   Extra Green Top (Lithium Heparin) Tube    Collection Time: 08/14/24  6:25 AM   Result Value Ref Range    Hold Specimen JIC    Glucose by meter    Collection Time: 08/14/24  6:50 AM   Result Value Ref Range    GLUCOSE BY METER POCT 150 (H) 70 - 99 mg/dL   Glucose by meter    Collection Time: 08/14/24 11:31 AM   Result Value Ref Range    GLUCOSE BY METER POCT 230 (H) 70 - 99 mg/dL   Glucose by meter    Collection Time: 08/14/24  4:33 PM   Result Value Ref Range    GLUCOSE BY METER POCT 240 (H) 70 - 99 mg/dL   Glucose by meter    Collection Time: 08/14/24  5:37 PM   Result Value Ref Range    GLUCOSE BY METER POCT 201 (H) 70 - 99 mg/dL   Glucose by meter    Collection Time: 08/14/24  9:22 PM   Result Value Ref Range    GLUCOSE BY METER POCT 230 (H) 70 - 99 mg/dL   Glucose by meter    Collection Time: 08/15/24  1:57 AM   Result Value Ref Range    GLUCOSE BY METER POCT 156 (H) 70 - 99 mg/dL   CRP inflammation    Collection Time: 08/15/24  4:27 AM   Result Value Ref Range    CRP Inflammation 20.90 (H) <5.00 mg/L   Glucose by meter    Collection Time: 08/15/24  8:20 AM   Result Value Ref Range    GLUCOSE BY METER POCT 150 (H) 70 - 99 mg/dL   Glucose by meter    Collection Time: 08/15/24 12:11 PM   Result Value Ref Range    GLUCOSE BY METER POCT 202 (H) 70 - 99 mg/dL   Glucose by meter    Collection Time: 08/15/24  4:47 PM   Result Value Ref Range    GLUCOSE BY METER POCT 163 (H) 70 - 99 mg/dL   Glucose by meter    Collection Time: 08/15/24  9:10 PM   Result Value Ref Range    GLUCOSE BY METER POCT 272 (H) 70 - 99 mg/dL   CRP inflammation    Collection Time: 08/16/24  6:37 AM   Result Value Ref Range    CRP Inflammation 11.10 (H) <5.00 mg/L   Extra Purple Top Tube    Collection Time: 08/16/24  6:37 AM    Result Value Ref Range    Hold Specimen JIC    Glucose by meter    Collection Time: 08/16/24  8:02 AM   Result Value Ref Range    GLUCOSE BY METER POCT 196 (H) 70 - 99 mg/dL   Glucose by meter    Collection Time: 08/16/24 11:26 AM   Result Value Ref Range    GLUCOSE BY METER POCT 228 (H) 70 - 99 mg/dL   Glucose by meter    Collection Time: 08/16/24  4:11 PM   Result Value Ref Range    GLUCOSE BY METER POCT 148 (H) 70 - 99 mg/dL        MEDICATIONS:  MED REC REQUIRED  Post Medication Reconciliation Status: discharge medications reconciled, continue medications without change          Review of your medicines            Accurate as of August 19, 2024  8:43 PM. If you have any questions, ask your nurse or doctor.                CONTINUE these medicines which have NOT CHANGED        Dose / Directions   acetaminophen 325 MG tablet  Commonly known as: TYLENOL  Used for: Acute right-sided low back pain with right-sided sciatica      Dose: 650 mg  Take 2 tablets (650 mg) by mouth every 6 hours as needed for pain (and adjunct with moderate or severe pain or per patient request)  Refills: 0     aspirin 81 MG EC tablet  Used for: Coronary artery disease involving native coronary artery of native heart without angina pectoris      Dose: 81 mg  Take 1 tablet (81 mg) by mouth 2 times daily for 42 days Then return to once daily .  Refills: 0     colchicine 0.6 MG tablet  Commonly known as: COLCRYS  Used for: Idiopathic chronic gout of multiple sites without tophus      Dose: 0.6 mg  Take 1 tablet (0.6 mg) by mouth 2 times daily  Refills: 0     diclofenac 1 % topical gel  Commonly known as: VOLTAREN      Dose: 4 g  Apply 4 g topically 4 times daily as needed for moderate pain Left knee  Refills: 0     ferrous gluconate 324 (38 Fe) MG tablet  Commonly known as: FERGON  Used for: Iron deficiency anemia, unspecified iron deficiency anemia type      Dose: 324 mg  Take 1 tablet (324 mg) by mouth daily  Refills: 0     finasteride 5 MG  tablet  Commonly known as: PROSCAR      Dose: 5 mg  Take 5 mg by mouth daily  Refills: 0     fluticasone 50 MCG/ACT nasal spray  Commonly known as: FLONASE      Dose: 1-2 spray  Spray 1-2 sprays in nostril daily as needed  Refills: 0     gabapentin 300 MG capsule  Commonly known as: NEURONTIN      Dose: 300 mg  Take 300 mg by mouth 3 times daily  Refills: 0     glipiZIDE 2.5 MG 24 hr tablet  Commonly known as: GLUCOTROL XL      Dose: 2.5 mg  Take 2.5 mg by mouth daily  Refills: 0     insulin glargine 100 UNIT/ML pen  Commonly known as: LANTUS PEN  Used for: 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  Refills: 0     lactobacillus rhamnosus (GG) capsule      Dose: 2 capsule  Take 2 capsules by mouth daily Noon  Refills: 0     Lidocaine 4 % Patch  Commonly known as: LIDOCARE      Dose: 1 patch  Place 1 patch onto the skin every 24 hours To prevent lidocaine toxicity, patient should be patch free for 12 hrs daily.  Refills: 0     lisinopril 10 MG tablet  Commonly known as: ZESTRIL  Used for: Chronic diastolic heart failure (H)      Dose: 10 mg  Take 1 tablet (10 mg) by mouth every 24 hours  Quantity: 90 tablet  Refills: 3     loperamide 2 MG tablet  Commonly known as: IMODIUM A-D      Dose: 2 mg  Take 2 mg by mouth 4 times daily as needed for diarrhea  Refills: 0     methocarbamol 500 MG tablet  Commonly known as: ROBAXIN      Dose: 500 mg  Take 500 mg by mouth every 8 hours as needed for muscle spasms  Refills: 0     nortriptyline 10 MG capsule  Commonly known as: PAMELOR      Dose: 20 mg  Take 20 mg by mouth nightly as needed  Refills: 0     oxyCODONE 5 MG tablet  Commonly known as: ROXICODONE  Used for: Acute right-sided low back pain with  right-sided sciatica      Dose: 2.5-5 mg  Take 0.5-1 tablets (2.5-5 mg) by mouth every 6 hours as needed for moderate to severe pain (2.5 mg for moderate pain, 5 mg for severe pain.)  Quantity: 10 tablet  Refills: 0     polyethylene glycol-propylene glycol 0.4-0.3 % Soln ophthalmic solution  Commonly known as: SYSTANE ULTRA      Dose: 1 drop  1 drop daily  Refills: 0     Semaglutide 14 MG tablet  Commonly known as: RYBELSUS  Used for: Type 2 diabetes, HbA1c goal < 7% (H)      Dose: 14 mg  Take 14 mg by mouth daily  Quantity: 30 tablet  Refills: 3     sodium bicarbonate 650 MG tablet  Used for: Acidosis      Dose: 1,950 mg  Take 3 tablets (1,950 mg) by mouth 3 times daily  Quantity: 180 tablet  Refills: 1     sodium chloride 0.9 % infusion      Dose: 10 mL  Inject 10 mLs into the vein continuously 24 hours before and after antibiotics  Refills: 0     torsemide 10 MG tablet  Commonly known as: DEMADEX  Used for: Essential hypertension      Dose: 10 mg  Take 1 tablet (10 mg) by mouth daily  Refills: 0     Tums 500 MG chewable tablet  Generic drug: calcium carbonate      Dose: 1,000 mg  Take 1,000 mg by mouth 3 times daily as needed for heartburn  Refills: 0     ursodiol 300 MG capsule  Commonly known as: ACTIGALL  Used for: Biliary stricture (H28)      Dose: 300 mg  Take 1 capsule (300 mg) by mouth 2 times daily  Quantity: 60 capsule  Refills: 0              ASSESSMENT/PLAN  Encounter Diagnoses   Name Primary?    Idiopathic chronic gout without tophus, unspecified site Yes    Physical deconditioning     Pain management      I/D right shoulder, elbow and thumb, monitor labs sutures in place staff to follow up to see if sutures can be removed. Follow up with ID     HX Diarrhea on Imodium 4 times daily as needed- resolved     Pain management as needed Tylenol, as needed oxycodone, diclofenac gel, lidocaine patch, methocarbamol,  spinal cord stimulator, Gabapentin     Mood disorder on nortriptyline     Physical  deconditioning  PT/OT    Gout on colchicine    Bone infection completed antibiotics  follow-up with infectious disease and neurosurgery,     Diabetes type 2 continue glipizide XL 2.5 mg daily, glargine 8 units subcu twice daily, semaglutide A1C 6/18/24 was 8.0    Hypertension on torsemide,  10 mg po daily  continue lisinopril    Electronically signed by: Darling Valdivia CNP

## 2024-08-20 ENCOUNTER — TRANSFERRED RECORDS (OUTPATIENT)
Dept: HEALTH INFORMATION MANAGEMENT | Facility: CLINIC | Age: 79
End: 2024-08-20

## 2024-08-20 ENCOUNTER — TELEPHONE (OUTPATIENT)
Dept: GERIATRICS | Facility: CLINIC | Age: 79
End: 2024-08-20

## 2024-08-20 ENCOUNTER — TELEPHONE (OUTPATIENT)
Dept: INFECTIOUS DISEASES | Facility: CLINIC | Age: 79
End: 2024-08-20

## 2024-08-20 LAB
ANION GAP SERPL CALCULATED.3IONS-SCNC: 11 MMOL/L (ref 7–15)
BUN SERPL-MCNC: 29.1 MG/DL (ref 8–23)
CALCIUM SERPL-MCNC: 9.2 MG/DL (ref 8.8–10.4)
CHLORIDE SERPL-SCNC: 104 MMOL/L (ref 98–107)
CREAT SERPL-MCNC: 1.17 MG/DL (ref 0.67–1.17)
CRP SERPL-MCNC: 3.19 MG/L
EGFRCR SERPLBLD CKD-EPI 2021: 63 ML/MIN/1.73M2
ERYTHROCYTE [DISTWIDTH] IN BLOOD BY AUTOMATED COUNT: 17 % (ref 10–15)
GLUCOSE SERPL-MCNC: 149 MG/DL (ref 70–99)
HCO3 SERPL-SCNC: 26 MMOL/L (ref 22–29)
HCT VFR BLD AUTO: 32.5 % (ref 40–53)
HGB BLD-MCNC: 9.4 G/DL (ref 13.3–17.7)
MCH RBC QN AUTO: 27.1 PG (ref 26.5–33)
MCHC RBC AUTO-ENTMCNC: 28.9 G/DL (ref 31.5–36.5)
MCV RBC AUTO: 94 FL (ref 78–100)
PLATELET # BLD AUTO: 467 10E3/UL (ref 150–450)
POTASSIUM SERPL-SCNC: 4.8 MMOL/L (ref 3.4–5.3)
RBC # BLD AUTO: 3.47 10E6/UL (ref 4.4–5.9)
SODIUM SERPL-SCNC: 141 MMOL/L (ref 135–145)
WBC # BLD AUTO: 7.9 10E3/UL (ref 4–11)

## 2024-08-20 PROCEDURE — 80048 BASIC METABOLIC PNL TOTAL CA: CPT | Mod: ORL | Performed by: NURSE PRACTITIONER

## 2024-08-20 PROCEDURE — P9604 ONE-WAY ALLOW PRORATED TRIP: HCPCS | Mod: ORL | Performed by: NURSE PRACTITIONER

## 2024-08-20 PROCEDURE — 85027 COMPLETE CBC AUTOMATED: CPT | Mod: ORL | Performed by: NURSE PRACTITIONER

## 2024-08-20 PROCEDURE — 36415 COLL VENOUS BLD VENIPUNCTURE: CPT | Mod: ORL | Performed by: NURSE PRACTITIONER

## 2024-08-20 PROCEDURE — 86140 C-REACTIVE PROTEIN: CPT | Mod: ORL | Performed by: NURSE PRACTITIONER

## 2024-08-20 NOTE — TELEPHONE ENCOUNTER
[facility] Call Center    Phone Message    May a detailed message be left on voicemail: yes     Reason for Call: Other: Daryl is calling about pt's sutures. Pt told RN that these need to be removed tomorrow. Nurse is wondering if they can remove it, they would just need orders. Please call Daryl back at ph: [phone], if after 2:30pm ask for evening nurse.     Action Taken: Message routed to:  Other: [facility] ID    Travel Screening: Not Applicable     Date of Service: 8/20

## 2024-08-20 NOTE — TELEPHONE ENCOUNTER
Spoke to TCU and informed they would need to contact surgeon/provider who placed sutures to discuss. Verbalized understanding

## 2024-08-20 NOTE — TELEPHONE ENCOUNTER
"[facility] Geriatrics Lab Note     Provider: ZACH Mckenzie  Facility: [facility]  Facility Type:  TCU    Allergies   Allergen Reactions    Ancef [Cefazolin] Rash    Cephalexin Itching and Rash     Allergic reaction required hospitalization 4/2024    Penicillins Anaphylaxis    Sulfa Antibiotics      \"itchy rash, swelling of face and hands\"       Labs Reviewed by provider: CRP, BMP, CBC     Verbal Order/Direction given by Provider: Send results to ID  Check CBC on 8/26/24; dx: thrombocytosis    Provider giving Order:  ZACH Mckenzie    Verbal Order given to: Becky Galvan RN  "

## 2024-08-22 VITALS
WEIGHT: 193.2 LBS | RESPIRATION RATE: 16 BRPM | SYSTOLIC BLOOD PRESSURE: 136 MMHG | HEIGHT: 72 IN | BODY MASS INDEX: 26.17 KG/M2 | DIASTOLIC BLOOD PRESSURE: 87 MMHG | TEMPERATURE: 98.1 F | OXYGEN SATURATION: 97 % | HEART RATE: 92 BPM

## 2024-08-22 NOTE — PROGRESS NOTES
[facility] GERIATRIC SERVICES  Chief Complaint   Patient presents with    CHARLEE     [place] Medical Record Number:  [id number]  Place of Service where encounter took place:  [facility] ([facility]) [[id number]]  Code Status:  unknown     HISTORY:      HPI:  Lance Ibrahim  is 79 year old (1945) undergoing physical and occupational therapy.    He is with past medical history significant for HFpEF, diabetes, gout,  Excerpted from records recent hospitalization 5/2024 for urosepsis and bacteremia due to Klebsiella. Eventually diagnosed with L4-L5 discitis and 6/19.  Was placed on IV antibiotic with vancomycin and ertapenem. He presented to the hospital on 8/4/2024 with acute right shoulder pain which progressed to involve the right elbow, right thumb.  Working diagnosis was initially gout attack versus septic arthritis. MRI of the right shoulder showed signs of right acromioclavicular inflammation with subdeltoid bursitis, possible early osteomyelitis. Evaluated by orthopedic surgery. Plans were for I&D of the right shoulder and right elbow on 8/7.       Status post Irrigation and debridement of the right AC joint, right olecranon bursa, right elbow joint, right thumb MCP joint, Open distal clavicle excision, right, on 8/7/24, with EBL of 50ccs by Dr. Hanson. Also status post ultrasound with aspiration of right knee. Pt was treated with both IV antibiotics and colchicine daily.  MRI Cervical-spine 8/10 without any acute findings, though spondylosis and stenosis were noted. Anticoagulation plan is aspirin twice daily for 42 days.       Colchicine was increased to twice daily, and ultimately decision made to monitor off of antibiotics, vancomycin discontinued on 8/12.  The patient started to have clinical improvement, on 8/15 for the first time he was able to have improved range of motion in his lower limbs, discussed with infectious disease and given clinic appointment could likely avoid starting steroids  for gout (would start allopurinol after flare-up; likely for outpatient start).       Continued to improve off of steroids on higher dose of colchicine. He did not require steroids. When his acute flare of gout has resolved he should be started on allopurinol or other antigout agent.     Today he is seen to review VS, and for medication encounter.  He was seen to review his nortriptyline.  Per his report he took that prior to his spinal cord stimulator and does not need it anymore.  The medication was discontinued.  Writer did also follow-up on his sutures which have not been removed.  Patient did give the phone number of the surgeon and follow-up will be done today.  As Stated above he was recovering in the TCU when he was sent to the ER on 8/4/24 with right shoulder pain which progressed to right elbow and thumb.   He was seen at the bedside and noted to have sutures in all three areas, No redness or drainage noted.  He was diagnosed with COVID on 7/18/2024.  He is out of isolation and asymptomatic.   Denies shortness of breath.   He was alert and oriented x 4.   He is with a spinal cord stimulator right lower back in which she reports he charges weekly.  He is no longer on antibiotics, Labs being monitored.    A1c 2/20/2024 8.0.  Right knee without redness, He will undergo left knee surgery on  September 16, 2024.  He reports his pain is controlled. He is with right heel wound and dressing change orders are in place.  He reports his right knee is feeling much better.  He will continue 1 more week of the colchicine and then it  will discontinue and he will start 100 mg of allopurinol    ALLERGIES:Ancef [cefazolin], Cephalexin, Penicillins, and Sulfa antibiotics    PAST MEDICAL HISTORY:   Past Medical History:   Diagnosis Date    Anemia     Arthritis     osteoarthritis knees    BPH     CAD (coronary artery disease)     subtotal occlusion in the small distal LAD     Cardiomyopathy     Cerebral artery occlusion with  cerebral infarction     TIA 1993, no residual    Cervicalgia     CHF (congestive heart failure) (H)     Chronic kidney disease     Chronic low back pain     Chronic rhinitis     Gouty arthropathy     hands    Hyperlipidemia     Hypertension     Kidney stone     Nocturia     Obesity     Osteomyelitis (H) 08/2023    Foot    PVD (peripheral vascular disease)     Sleep apnea     doesn't use cpap    TIA (transient ischaemic attack) 1993    Type 2 diabetes mellitus - 11/08/2018    Ureteral stone        PAST SURGICAL HISTORY:   has a past surgical history that includes Diastasis recti repair (1985); Ventral hernia repair NOS (1987); ORIF Shoulder (Left); Colonoscopy (03/09/2013); hernia repair (07/13/2004); Foot surgery (04/2013); Amputate toe(s) (Left, 10/15/2018); Arthroplasty knee (Right, 12/07/2018); Heart Catheterization with Possible Intervention (Left, 03/05/2019); Extracapsular cataract extration with intraocular lens implant (Left, 03/13/2017); Extracapsular cataract extration with intraocular lens implant (Right); Amputate foot (Right, 08/07/2023); Esophagoscopy, gastroscopy, duodenoscopy (EGD), combined (N/A, 04/30/2024); Endoscopic retrograde cholangiopancreatogram (N/A, 04/30/2024); Spinal Cord Stimulator Implant (04/03/2024); Prostate surgery (02/2024); IR Disc Aspriation Injection (6/19/2024); Irrigation and debridement shoulder, combined (Right, 8/7/2024); Resect clavicle distal (Right, 8/7/2024); and Irrigation and debridement upper extremity, combined (Right, 8/7/2024).    FAMILY HISTORY: family history includes Alcohol/Drug in his paternal grandfather; Cancer in his father; Diabetes in his maternal grandfather; Family History Negative in his mother.    SOCIAL HISTORY:  reports that he quit smoking about 31 years ago. His smoking use included cigarettes. He has never been exposed to tobacco smoke. He has never used smokeless tobacco. He reports that he does not currently use alcohol. He reports that he  does not use drugs.    ROS:  Constitutional: Negative for activity change, appetite change, fatigue and fever.   HENT: Negative for congestion.    Respiratory: Negative for cough, shortness of breath and wheezing.    Cardiovascular: Negative for chest pain and leg swelling.   Gastrointestinal: Negative for abdominal distention, abdominal pain, constipation, diarrhea and nausea.   Genitourinary: Negative for dysuria.   Musculoskeletal: positive  for arthralgia. Positive  for back pain.   Skin: Negative for color change and wound.  No longer has a PICC LINE  Sutures right shoulder, elbow and thumb.   Neurological: Negative for dizziness.   Psychiatric/Behavioral: Negative for agitation, behavioral problems and confusion.     Physical Exam:  Constitutional:       Appearance: Patient is well-developed.   HENT:      Head: Normocephalic.   Eyes:      Conjunctiva/sclera: Conjunctivae normal.   Neck:      Musculoskeletal: Normal range of motion.   Cardiovascular:      Rate and Rhythm: Normal rate and regular rhythm.      Heart sounds: Normal heart sounds. No murmur.   Pulmonary:      Effort: No respiratory distress.      Breath sounds: Normal breath sounds.   no wheezing or rales.   Abdominal:      General: Bowel sounds are normal. There is no distension.      Palpations: Abdomen is soft.      Tenderness: There is no abdominal tenderness.   Musculoskeletal:       Normal range of motion.  decreased ROM LUE and LLE scheduled for left knee surgery September.  Skin:General:        Skin is warm. Sutures right shoulder, elbow and thumb  Neurological:         Mental Status: Patient is alert and oriented to person, place, and time.   Psychiatric:         Behavior: Behavior normal.     Vitals:/87   Pulse 92   Temp 98.1  F (36.7  C)   Resp 16   Ht 1.829 m (6')   Wt 87.6 kg (193 lb 3.2 oz)   SpO2 97%   BMI 26.20 kg/m   and Body mass index is 26.2 kg/m .    Lab/Diagnostic data:   Recent Results (from the past 240 hour(s))    CRP inflammation    Collection Time: 08/13/24  1:28 PM   Result Value Ref Range    CRP Inflammation 53.20 (H) <5.00 mg/L   Glucose by meter    Collection Time: 08/13/24  5:30 PM   Result Value Ref Range    GLUCOSE BY METER POCT 209 (H) 70 - 99 mg/dL   Glucose by meter    Collection Time: 08/13/24  9:32 PM   Result Value Ref Range    GLUCOSE BY METER POCT 224 (H) 70 - 99 mg/dL   Glucose by meter    Collection Time: 08/14/24  2:06 AM   Result Value Ref Range    GLUCOSE BY METER POCT 155 (H) 70 - 99 mg/dL   CRP inflammation    Collection Time: 08/14/24  6:25 AM   Result Value Ref Range    CRP Inflammation 34.70 (H) <5.00 mg/L   Extra Green Top (Lithium Heparin) Tube    Collection Time: 08/14/24  6:25 AM   Result Value Ref Range    Hold Specimen JIC    Glucose by meter    Collection Time: 08/14/24  6:50 AM   Result Value Ref Range    GLUCOSE BY METER POCT 150 (H) 70 - 99 mg/dL   Glucose by meter    Collection Time: 08/14/24 11:31 AM   Result Value Ref Range    GLUCOSE BY METER POCT 230 (H) 70 - 99 mg/dL   Glucose by meter    Collection Time: 08/14/24  4:33 PM   Result Value Ref Range    GLUCOSE BY METER POCT 240 (H) 70 - 99 mg/dL   Glucose by meter    Collection Time: 08/14/24  5:37 PM   Result Value Ref Range    GLUCOSE BY METER POCT 201 (H) 70 - 99 mg/dL   Glucose by meter    Collection Time: 08/14/24  9:22 PM   Result Value Ref Range    GLUCOSE BY METER POCT 230 (H) 70 - 99 mg/dL   Glucose by meter    Collection Time: 08/15/24  1:57 AM   Result Value Ref Range    GLUCOSE BY METER POCT 156 (H) 70 - 99 mg/dL   CRP inflammation    Collection Time: 08/15/24  4:27 AM   Result Value Ref Range    CRP Inflammation 20.90 (H) <5.00 mg/L   Glucose by meter    Collection Time: 08/15/24  8:20 AM   Result Value Ref Range    GLUCOSE BY METER POCT 150 (H) 70 - 99 mg/dL   Glucose by meter    Collection Time: 08/15/24 12:11 PM   Result Value Ref Range    GLUCOSE BY METER POCT 202 (H) 70 - 99 mg/dL   Glucose by meter    Collection  Time: 08/15/24  4:47 PM   Result Value Ref Range    GLUCOSE BY METER POCT 163 (H) 70 - 99 mg/dL   Glucose by meter    Collection Time: 08/15/24  9:10 PM   Result Value Ref Range    GLUCOSE BY METER POCT 272 (H) 70 - 99 mg/dL   CRP inflammation    Collection Time: 08/16/24  6:37 AM   Result Value Ref Range    CRP Inflammation 11.10 (H) <5.00 mg/L   Extra Purple Top Tube    Collection Time: 08/16/24  6:37 AM   Result Value Ref Range    Hold Specimen JIC    Glucose by meter    Collection Time: 08/16/24  8:02 AM   Result Value Ref Range    GLUCOSE BY METER POCT 196 (H) 70 - 99 mg/dL   Glucose by meter    Collection Time: 08/16/24 11:26 AM   Result Value Ref Range    GLUCOSE BY METER POCT 228 (H) 70 - 99 mg/dL   Glucose by meter    Collection Time: 08/16/24  4:11 PM   Result Value Ref Range    GLUCOSE BY METER POCT 148 (H) 70 - 99 mg/dL   CBC with platelets    Collection Time: 08/20/24  6:29 AM   Result Value Ref Range    WBC Count 7.9 4.0 - 11.0 10e3/uL    RBC Count 3.47 (L) 4.40 - 5.90 10e6/uL    Hemoglobin 9.4 (L) 13.3 - 17.7 g/dL    Hematocrit 32.5 (L) 40.0 - 53.0 %    MCV 94 78 - 100 fL    MCH 27.1 26.5 - 33.0 pg    MCHC 28.9 (L) 31.5 - 36.5 g/dL    RDW 17.0 (H) 10.0 - 15.0 %    Platelet Count 467 (H) 150 - 450 10e3/uL   Glucose (OUTREACH)    Collection Time: 08/20/24  6:29 AM   Result Value Ref Range    Glucose 149 (H) 70 - 99 mg/dL   Basic Metabolic Panel No Glucose (OUTREACH)    Collection Time: 08/20/24  6:29 AM   Result Value Ref Range    Sodium 141 135 - 145 mmol/L    Potassium 4.8 3.4 - 5.3 mmol/L    Chloride 104 98 - 107 mmol/L    Carbon Dioxide (CO2) 26 22 - 29 mmol/L    Anion Gap 11 7 - 15 mmol/L    Urea Nitrogen 29.1 (H) 8.0 - 23.0 mg/dL    Creatinine 1.17 0.67 - 1.17 mg/dL    GFR Estimate 63 >60 mL/min/1.73m2    Calcium 9.2 8.8 - 10.4 mg/dL   CRP inflammation    Collection Time: 08/20/24  6:29 AM   Result Value Ref Range    CRP Inflammation 3.19 <5.00 mg/L        MEDICATIONS:  MED REC REQUIRED  Post  Medication Reconciliation Status: discharge medications reconciled, continue medications without change          Review of your medicines            Accurate as of August 23, 2024 11:58 AM. If you have any questions, ask your nurse or doctor.                CONTINUE these medicines which may have CHANGED, or have new prescriptions. If we are uncertain of the size of tablets/capsules you have at home, strength may be listed as something that might have changed.        Dose / Directions   colchicine 0.6 MG tablet  Commonly known as: COLCRYS  This may have changed: additional instructions  Used for: Idiopathic chronic gout of multiple sites without tophus      Dose: 0.6 mg  Take 1 tablet (0.6 mg) by mouth 2 times daily  Refills: 0            CONTINUE these medicines which have NOT CHANGED        Dose / Directions   acetaminophen 325 MG tablet  Commonly known as: TYLENOL  Used for: Acute right-sided low back pain with right-sided sciatica      Dose: 650 mg  Take 2 tablets (650 mg) by mouth every 6 hours as needed for pain (and adjunct with moderate or severe pain or per patient request)  Refills: 0     aspirin 81 MG EC tablet  Used for: Coronary artery disease involving native coronary artery of native heart without angina pectoris      Dose: 81 mg  Take 1 tablet (81 mg) by mouth 2 times daily for 42 days Then return to once daily .  Refills: 0     diclofenac 1 % topical gel  Commonly known as: VOLTAREN      Dose: 4 g  Apply 4 g topically 4 times daily as needed for moderate pain Left knee  Refills: 0     ferrous gluconate 324 (38 Fe) MG tablet  Commonly known as: FERGON  Used for: Iron deficiency anemia, unspecified iron deficiency anemia type      Dose: 324 mg  Take 1 tablet (324 mg) by mouth daily  Refills: 0     finasteride 5 MG tablet  Commonly known as: PROSCAR      Dose: 5 mg  Take 5 mg by mouth daily  Refills: 0     fluticasone 50 MCG/ACT nasal spray  Commonly known as: FLONASE      Dose: 1-2 spray  Spray 1-2  sprays in nostril daily as needed  Refills: 0     gabapentin 300 MG capsule  Commonly known as: NEURONTIN      Dose: 300 mg  Take 300 mg by mouth 3 times daily  Refills: 0     glipiZIDE 2.5 MG 24 hr tablet  Commonly known as: GLUCOTROL XL      Dose: 2.5 mg  Take 2.5 mg by mouth daily  Refills: 0     insulin glargine 100 UNIT/ML pen  Commonly known as: LANTUS PEN  Used for: 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  Refills: 0     lactobacillus rhamnosus (GG) capsule      Dose: 2 capsule  Take 2 capsules by mouth daily Noon  Refills: 0     Lidocaine 4 % Patch  Commonly known as: LIDOCARE      Dose: 1 patch  Place 1 patch onto the skin every 24 hours To prevent lidocaine toxicity, patient should be patch free for 12 hrs daily.  Refills: 0     lisinopril 10 MG tablet  Commonly known as: ZESTRIL  Used for: Chronic diastolic heart failure (H)      Dose: 10 mg  Take 1 tablet (10 mg) by mouth every 24 hours  Quantity: 90 tablet  Refills: 3     loperamide 2 MG tablet  Commonly known as: IMODIUM A-D      Dose: 2 mg  Take 2 mg by mouth 4 times daily as needed for diarrhea  Refills: 0     methocarbamol 500 MG tablet  Commonly known as: ROBAXIN      Dose: 500 mg  Take 500 mg by mouth every 8 hours as needed for muscle spasms  Refills: 0     oxyCODONE 5 MG tablet  Commonly known as: ROXICODONE  Used for: Acute right-sided low back pain with right-sided sciatica      Dose: 2.5-5 mg  Take 0.5-1 tablets (2.5-5 mg) by mouth every 6 hours as needed for moderate to severe pain (2.5 mg for moderate pain, 5 mg for severe pain.)  Quantity: 10 tablet  Refills: 0     polyethylene glycol-propylene glycol 0.4-0.3 % Soln ophthalmic solution  Commonly known as:  SYSTANE ULTRA      Dose: 1 drop  1 drop daily  Refills: 0     Semaglutide 14 MG tablet  Commonly known as: RYBELSUS  Used for: Type 2 diabetes, HbA1c goal < 7% (H)      Dose: 14 mg  Take 14 mg by mouth daily  Quantity: 30 tablet  Refills: 3     sodium bicarbonate 650 MG tablet  Used for: Acidosis      Dose: 1,950 mg  Take 3 tablets (1,950 mg) by mouth 3 times daily  Quantity: 180 tablet  Refills: 1     torsemide 10 MG tablet  Commonly known as: DEMADEX  Used for: Essential hypertension      Dose: 10 mg  Take 1 tablet (10 mg) by mouth daily  Refills: 0     Tums 500 MG chewable tablet  Generic drug: calcium carbonate      Dose: 1,000 mg  Take 1,000 mg by mouth 3 times daily as needed for heartburn  Refills: 0     ursodiol 300 MG capsule  Commonly known as: ACTIGALL  Used for: Biliary stricture (H28)      Dose: 300 mg  Take 1 capsule (300 mg) by mouth 2 times daily  Quantity: 60 capsule  Refills: 0            STOP taking      nortriptyline 10 MG capsule  Commonly known as: PAMELOR  Stopped by: Darling Valdivia        sodium chloride 0.9 % infusion  Stopped by: Darling Valdivia                 ASSESSMENT/PLAN  Encounter Diagnoses   Name Primary?    Encounter for medication review and counseling Yes    Encounter for removal of sutures        I/D right shoulder, elbow and thumb, monitor labs sutures in place. Follow up with ID. Orthopedics to be updated to see if sutures can be removed at [facility]     HX Diarrhea on Imodium 4 times daily as needed- resolved     Pain management as needed Tylenol, as needed oxycodone, diclofenac gel, lidocaine patch, methocarbamol,  spinal cord stimulator, Gabapentin     Mood disorder  nortriptyline discontinued, Reports he has never been depressed no longer takes the medication     Physical deconditioning  PT/OT    Gout on colchicine for a total of 2 weeks and then will start allopurinol    Estimated Creatinine Clearance: 62.4 mL/min (based on SCr of 1.17 mg/dL).     Bone infection completed  antibiotics  follow-up with infectious disease and neurosurgery,     Diabetes type 2 continue glipizide XL 2.5 mg daily, glargine 8 units subcu twice daily, semaglutide A1C 6/18/24 was 8.0    Hypertension on torsemide,  10 mg po daily  continue lisinopril    Electronically signed by: Darling Valdivia CNP

## 2024-08-23 ENCOUNTER — TRANSITIONAL CARE UNIT VISIT (OUTPATIENT)
Dept: GERIATRICS | Facility: CLINIC | Age: 79
End: 2024-08-23
Payer: COMMERCIAL

## 2024-08-23 DIAGNOSIS — Z71.89 ENCOUNTER FOR MEDICATION REVIEW AND COUNSELING: Primary | ICD-10-CM

## 2024-08-23 DIAGNOSIS — Z48.02 ENCOUNTER FOR REMOVAL OF SUTURES: ICD-10-CM

## 2024-08-23 LAB — BACTERIA SNV CULT: NORMAL

## 2024-08-23 PROCEDURE — 99308 SBSQ NF CARE LOW MDM 20: CPT | Performed by: NURSE PRACTITIONER

## 2024-08-23 NOTE — LETTER
8/23/2024      Lance Ibrahim  [address]  [facility] [address]        [facility] GERIATRIC SERVICES  Chief Complaint   Patient presents with     CHARLEE     Peck Medical Record Number:  [id number]  Place of Service where encounter took place:  [facility] ([facility]) [[id number]]  Code Status:  unknown     HISTORY:      HPI:  Lance Ibrahim  is 79 year old (1945) undergoing physical and occupational therapy.    He is with past medical history significant for HFpEF, diabetes, gout,  Excerpted from records recent hospitalization 5/2024 for urosepsis and bacteremia due to Klebsiella. Eventually diagnosed with L4-L5 discitis and 6/19.  Was placed on IV antibiotic with vancomycin and ertapenem. He presented to the hospital on 8/4/2024 with acute right shoulder pain which progressed to involve the right elbow, right thumb.  Working diagnosis was initially gout attack versus septic arthritis. MRI of the right shoulder showed signs of right acromioclavicular inflammation with subdeltoid bursitis, possible early osteomyelitis. Evaluated by orthopedic surgery. Plans were for I&D of the right shoulder and right elbow on 8/7.       Status post Irrigation and debridement of the right AC joint, right olecranon bursa, right elbow joint, right thumb MCP joint, Open distal clavicle excision, right, on 8/7/24, with EBL of 50ccs by Dr. Hanson. Also status post ultrasound with aspiration of right knee. Pt was treated with both IV antibiotics and colchicine daily.  MRI Cervical-spine 8/10 without any acute findings, though spondylosis and stenosis were noted. Anticoagulation plan is aspirin twice daily for 42 days.       Colchicine was increased to twice daily, and ultimately decision made to monitor off of antibiotics, vancomycin discontinued on 8/12.  The patient started to have clinical improvement, on 8/15 for the first time he was able to have improved range of motion in his lower limbs, discussed with  infectious disease and given clinic appointment could likely avoid starting steroids for gout (would start allopurinol after flare-up; likely for outpatient start).       Continued to improve off of steroids on higher dose of colchicine. He did not require steroids. When his acute flare of gout has resolved he should be started on allopurinol or other antigout agent.     Today he is seen to review VS, and for medication encounter.  He was seen to review his nortriptyline.  Per his report he took that prior to his spinal cord stimulator and does not need it anymore.  The medication was discontinued.  Writer did also follow-up on his sutures which have not been removed.  Patient did give the phone number of the surgeon and follow-up will be done today.  As Stated above he was recovering in the TCU when he was sent to the ER on 8/4/24 with right shoulder pain which progressed to right elbow and thumb.   He was seen at the bedside and noted to have sutures in all three areas, No redness or drainage noted.  He was diagnosed with COVID on 7/18/2024.  He is out of isolation and asymptomatic.   Denies shortness of breath.   He was alert and oriented x 4.   He is with a spinal cord stimulator right lower back in which she reports he charges weekly.  He is no longer on antibiotics, Labs being monitored.    A1c 2/20/2024 8.0.  Right knee without redness, He will undergo left knee surgery on  September 16, 2024.  He reports his pain is controlled. He is with right heel wound and dressing change orders are in place.  He reports his right knee is feeling much better.  He will continue 1 more week of the colchicine and then it  will discontinue and he will start 100 mg of allopurinol    ALLERGIES:Ancef [cefazolin], Cephalexin, Penicillins, and Sulfa antibiotics    PAST MEDICAL HISTORY:   Past Medical History:   Diagnosis Date     Anemia      Arthritis     osteoarthritis knees     BPH      CAD (coronary artery disease)     subtotal  occlusion in the small distal LAD      Cardiomyopathy      Cerebral artery occlusion with cerebral infarction     TIA 1993, no residual     Cervicalgia      CHF (congestive heart failure) (H)      Chronic kidney disease      Chronic low back pain      Chronic rhinitis      Gouty arthropathy     hands     Hyperlipidemia      Hypertension      Kidney stone      Nocturia      Obesity      Osteomyelitis (H) 08/2023    Foot     PVD (peripheral vascular disease)      Sleep apnea     doesn't use cpap     TIA (transient ischaemic attack) 1993     Type 2 diabetes mellitus - 11/08/2018     Ureteral stone        PAST SURGICAL HISTORY:   has a past surgical history that includes Diastasis recti repair (1985); Ventral hernia repair NOS (1987); ORIF Shoulder (Left); Colonoscopy (03/09/2013); hernia repair (07/13/2004); Foot surgery (04/2013); Amputate toe(s) (Left, 10/15/2018); Arthroplasty knee (Right, 12/07/2018); Heart Catheterization with Possible Intervention (Left, 03/05/2019); Extracapsular cataract extration with intraocular lens implant (Left, 03/13/2017); Extracapsular cataract extration with intraocular lens implant (Right); Amputate foot (Right, 08/07/2023); Esophagoscopy, gastroscopy, duodenoscopy (EGD), combined (N/A, 04/30/2024); Endoscopic retrograde cholangiopancreatogram (N/A, 04/30/2024); Spinal Cord Stimulator Implant (04/03/2024); Prostate surgery (02/2024); IR Disc Aspriation Injection (6/19/2024); Irrigation and debridement shoulder, combined (Right, 8/7/2024); Resect clavicle distal (Right, 8/7/2024); and Irrigation and debridement upper extremity, combined (Right, 8/7/2024).    FAMILY HISTORY: family history includes Alcohol/Drug in his paternal grandfather; Cancer in his father; Diabetes in his maternal grandfather; Family History Negative in his mother.    SOCIAL HISTORY:  reports that he quit smoking about 31 years ago. His smoking use included cigarettes. He has never been exposed to tobacco smoke. He  has never used smokeless tobacco. He reports that he does not currently use alcohol. He reports that he does not use drugs.    ROS:  Constitutional: Negative for activity change, appetite change, fatigue and fever.   HENT: Negative for congestion.    Respiratory: Negative for cough, shortness of breath and wheezing.    Cardiovascular: Negative for chest pain and leg swelling.   Gastrointestinal: Negative for abdominal distention, abdominal pain, constipation, diarrhea and nausea.   Genitourinary: Negative for dysuria.   Musculoskeletal: positive  for arthralgia. Positive  for back pain.   Skin: Negative for color change and wound.  No longer has a PICC LINE  Sutures right shoulder, elbow and thumb.   Neurological: Negative for dizziness.   Psychiatric/Behavioral: Negative for agitation, behavioral problems and confusion.     Physical Exam:  Constitutional:       Appearance: Patient is well-developed.   HENT:      Head: Normocephalic.   Eyes:      Conjunctiva/sclera: Conjunctivae normal.   Neck:      Musculoskeletal: Normal range of motion.   Cardiovascular:      Rate and Rhythm: Normal rate and regular rhythm.      Heart sounds: Normal heart sounds. No murmur.   Pulmonary:      Effort: No respiratory distress.      Breath sounds: Normal breath sounds.   no wheezing or rales.   Abdominal:      General: Bowel sounds are normal. There is no distension.      Palpations: Abdomen is soft.      Tenderness: There is no abdominal tenderness.   Musculoskeletal:       Normal range of motion.  decreased ROM NICOLE and DULCE scheduled for left knee surgery September.  Skin:General:        Skin is warm. Sutures right shoulder, elbow and thumb  Neurological:         Mental Status: Patient is alert and oriented to person, place, and time.   Psychiatric:         Behavior: Behavior normal.     Vitals:/87   Pulse 92   Temp 98.1  F (36.7  C)   Resp 16   Ht 1.829 m (6')   Wt 87.6 kg (193 lb 3.2 oz)   SpO2 97%   BMI 26.20 kg/m    and Body mass index is 26.2 kg/m .    Lab/Diagnostic data:   Recent Results (from the past 240 hour(s))   CRP inflammation    Collection Time: 08/13/24  1:28 PM   Result Value Ref Range    CRP Inflammation 53.20 (H) <5.00 mg/L   Glucose by meter    Collection Time: 08/13/24  5:30 PM   Result Value Ref Range    GLUCOSE BY METER POCT 209 (H) 70 - 99 mg/dL   Glucose by meter    Collection Time: 08/13/24  9:32 PM   Result Value Ref Range    GLUCOSE BY METER POCT 224 (H) 70 - 99 mg/dL   Glucose by meter    Collection Time: 08/14/24  2:06 AM   Result Value Ref Range    GLUCOSE BY METER POCT 155 (H) 70 - 99 mg/dL   CRP inflammation    Collection Time: 08/14/24  6:25 AM   Result Value Ref Range    CRP Inflammation 34.70 (H) <5.00 mg/L   Extra Green Top (Lithium Heparin) Tube    Collection Time: 08/14/24  6:25 AM   Result Value Ref Range    Hold Specimen JIC    Glucose by meter    Collection Time: 08/14/24  6:50 AM   Result Value Ref Range    GLUCOSE BY METER POCT 150 (H) 70 - 99 mg/dL   Glucose by meter    Collection Time: 08/14/24 11:31 AM   Result Value Ref Range    GLUCOSE BY METER POCT 230 (H) 70 - 99 mg/dL   Glucose by meter    Collection Time: 08/14/24  4:33 PM   Result Value Ref Range    GLUCOSE BY METER POCT 240 (H) 70 - 99 mg/dL   Glucose by meter    Collection Time: 08/14/24  5:37 PM   Result Value Ref Range    GLUCOSE BY METER POCT 201 (H) 70 - 99 mg/dL   Glucose by meter    Collection Time: 08/14/24  9:22 PM   Result Value Ref Range    GLUCOSE BY METER POCT 230 (H) 70 - 99 mg/dL   Glucose by meter    Collection Time: 08/15/24  1:57 AM   Result Value Ref Range    GLUCOSE BY METER POCT 156 (H) 70 - 99 mg/dL   CRP inflammation    Collection Time: 08/15/24  4:27 AM   Result Value Ref Range    CRP Inflammation 20.90 (H) <5.00 mg/L   Glucose by meter    Collection Time: 08/15/24  8:20 AM   Result Value Ref Range    GLUCOSE BY METER POCT 150 (H) 70 - 99 mg/dL   Glucose by meter    Collection Time: 08/15/24 12:11 PM    Result Value Ref Range    GLUCOSE BY METER POCT 202 (H) 70 - 99 mg/dL   Glucose by meter    Collection Time: 08/15/24  4:47 PM   Result Value Ref Range    GLUCOSE BY METER POCT 163 (H) 70 - 99 mg/dL   Glucose by meter    Collection Time: 08/15/24  9:10 PM   Result Value Ref Range    GLUCOSE BY METER POCT 272 (H) 70 - 99 mg/dL   CRP inflammation    Collection Time: 08/16/24  6:37 AM   Result Value Ref Range    CRP Inflammation 11.10 (H) <5.00 mg/L   Extra Purple Top Tube    Collection Time: 08/16/24  6:37 AM   Result Value Ref Range    Hold Specimen JIC    Glucose by meter    Collection Time: 08/16/24  8:02 AM   Result Value Ref Range    GLUCOSE BY METER POCT 196 (H) 70 - 99 mg/dL   Glucose by meter    Collection Time: 08/16/24 11:26 AM   Result Value Ref Range    GLUCOSE BY METER POCT 228 (H) 70 - 99 mg/dL   Glucose by meter    Collection Time: 08/16/24  4:11 PM   Result Value Ref Range    GLUCOSE BY METER POCT 148 (H) 70 - 99 mg/dL   CBC with platelets    Collection Time: 08/20/24  6:29 AM   Result Value Ref Range    WBC Count 7.9 4.0 - 11.0 10e3/uL    RBC Count 3.47 (L) 4.40 - 5.90 10e6/uL    Hemoglobin 9.4 (L) 13.3 - 17.7 g/dL    Hematocrit 32.5 (L) 40.0 - 53.0 %    MCV 94 78 - 100 fL    MCH 27.1 26.5 - 33.0 pg    MCHC 28.9 (L) 31.5 - 36.5 g/dL    RDW 17.0 (H) 10.0 - 15.0 %    Platelet Count 467 (H) 150 - 450 10e3/uL   Glucose (OUTREACH)    Collection Time: 08/20/24  6:29 AM   Result Value Ref Range    Glucose 149 (H) 70 - 99 mg/dL   Basic Metabolic Panel No Glucose (OUTREACH)    Collection Time: 08/20/24  6:29 AM   Result Value Ref Range    Sodium 141 135 - 145 mmol/L    Potassium 4.8 3.4 - 5.3 mmol/L    Chloride 104 98 - 107 mmol/L    Carbon Dioxide (CO2) 26 22 - 29 mmol/L    Anion Gap 11 7 - 15 mmol/L    Urea Nitrogen 29.1 (H) 8.0 - 23.0 mg/dL    Creatinine 1.17 0.67 - 1.17 mg/dL    GFR Estimate 63 >60 mL/min/1.73m2    Calcium 9.2 8.8 - 10.4 mg/dL   CRP inflammation    Collection Time: 08/20/24  6:29 AM    Result Value Ref Range    CRP Inflammation 3.19 <5.00 mg/L        MEDICATIONS:  MED REC REQUIRED  Post Medication Reconciliation Status: discharge medications reconciled, continue medications without change          Review of your medicines            Accurate as of August 23, 2024 11:58 AM. If you have any questions, ask your nurse or doctor.                CONTINUE these medicines which may have CHANGED, or have new prescriptions. If we are uncertain of the size of tablets/capsules you have at home, strength may be listed as something that might have changed.        Dose / Directions   colchicine 0.6 MG tablet  Commonly known as: COLCRYS  This may have changed: additional instructions  Used for: Idiopathic chronic gout of multiple sites without tophus      Dose: 0.6 mg  Take 1 tablet (0.6 mg) by mouth 2 times daily  Refills: 0            CONTINUE these medicines which have NOT CHANGED        Dose / Directions   acetaminophen 325 MG tablet  Commonly known as: TYLENOL  Used for: Acute right-sided low back pain with right-sided sciatica      Dose: 650 mg  Take 2 tablets (650 mg) by mouth every 6 hours as needed for pain (and adjunct with moderate or severe pain or per patient request)  Refills: 0     aspirin 81 MG EC tablet  Used for: Coronary artery disease involving native coronary artery of native heart without angina pectoris      Dose: 81 mg  Take 1 tablet (81 mg) by mouth 2 times daily for 42 days Then return to once daily .  Refills: 0     diclofenac 1 % topical gel  Commonly known as: VOLTAREN      Dose: 4 g  Apply 4 g topically 4 times daily as needed for moderate pain Left knee  Refills: 0     ferrous gluconate 324 (38 Fe) MG tablet  Commonly known as: FERGON  Used for: Iron deficiency anemia, unspecified iron deficiency anemia type      Dose: 324 mg  Take 1 tablet (324 mg) by mouth daily  Refills: 0     finasteride 5 MG tablet  Commonly known as: PROSCAR      Dose: 5 mg  Take 5 mg by mouth  daily  Refills: 0     fluticasone 50 MCG/ACT nasal spray  Commonly known as: FLONASE      Dose: 1-2 spray  Spray 1-2 sprays in nostril daily as needed  Refills: 0     gabapentin 300 MG capsule  Commonly known as: NEURONTIN      Dose: 300 mg  Take 300 mg by mouth 3 times daily  Refills: 0     glipiZIDE 2.5 MG 24 hr tablet  Commonly known as: GLUCOTROL XL      Dose: 2.5 mg  Take 2.5 mg by mouth daily  Refills: 0     insulin glargine 100 UNIT/ML pen  Commonly known as: LANTUS PEN  Used for: 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  Refills: 0     lactobacillus rhamnosus (GG) capsule      Dose: 2 capsule  Take 2 capsules by mouth daily Noon  Refills: 0     Lidocaine 4 % Patch  Commonly known as: LIDOCARE      Dose: 1 patch  Place 1 patch onto the skin every 24 hours To prevent lidocaine toxicity, patient should be patch free for 12 hrs daily.  Refills: 0     lisinopril 10 MG tablet  Commonly known as: ZESTRIL  Used for: Chronic diastolic heart failure (H)      Dose: 10 mg  Take 1 tablet (10 mg) by mouth every 24 hours  Quantity: 90 tablet  Refills: 3     loperamide 2 MG tablet  Commonly known as: IMODIUM A-D      Dose: 2 mg  Take 2 mg by mouth 4 times daily as needed for diarrhea  Refills: 0     methocarbamol 500 MG tablet  Commonly known as: ROBAXIN      Dose: 500 mg  Take 500 mg by mouth every 8 hours as needed for muscle spasms  Refills: 0     oxyCODONE 5 MG tablet  Commonly known as: ROXICODONE  Used for: Acute right-sided low back pain with right-sided sciatica      Dose: 2.5-5 mg  Take 0.5-1 tablets (2.5-5 mg) by mouth every 6 hours as needed for moderate to severe pain (2.5 mg for moderate pain, 5 mg for severe pain.)  Quantity: 10  tablet  Refills: 0     polyethylene glycol-propylene glycol 0.4-0.3 % Soln ophthalmic solution  Commonly known as: SYSTANE ULTRA      Dose: 1 drop  1 drop daily  Refills: 0     Semaglutide 14 MG tablet  Commonly known as: RYBELSUS  Used for: Type 2 diabetes, HbA1c goal < 7% (H)      Dose: 14 mg  Take 14 mg by mouth daily  Quantity: 30 tablet  Refills: 3     sodium bicarbonate 650 MG tablet  Used for: Acidosis      Dose: 1,950 mg  Take 3 tablets (1,950 mg) by mouth 3 times daily  Quantity: 180 tablet  Refills: 1     torsemide 10 MG tablet  Commonly known as: DEMADEX  Used for: Essential hypertension      Dose: 10 mg  Take 1 tablet (10 mg) by mouth daily  Refills: 0     Tums 500 MG chewable tablet  Generic drug: calcium carbonate      Dose: 1,000 mg  Take 1,000 mg by mouth 3 times daily as needed for heartburn  Refills: 0     ursodiol 300 MG capsule  Commonly known as: ACTIGALL  Used for: Biliary stricture (H28)      Dose: 300 mg  Take 1 capsule (300 mg) by mouth 2 times daily  Quantity: 60 capsule  Refills: 0            STOP taking      nortriptyline 10 MG capsule  Commonly known as: PAMELOR  Stopped by: Darling Valdivia        sodium chloride 0.9 % infusion  Stopped by: Darling Valdivia                 ASSESSMENT/PLAN  Encounter Diagnoses   Name Primary?     Encounter for medication review and counseling Yes     Encounter for removal of sutures        I/D right shoulder, elbow and thumb, monitor labs sutures in place. Follow up with ID. Orthopedics to be updated to see if sutures can be removed at [facility]     HX Diarrhea on Imodium 4 times daily as needed- resolved     Pain management as needed Tylenol, as needed oxycodone, diclofenac gel, lidocaine patch, methocarbamol,  spinal cord stimulator, Gabapentin     Mood disorder  nortriptyline discontinued, Reports he has never been depressed no longer takes the medication     Physical deconditioning  PT/OT    Gout on colchicine for a total of 2 weeks and then will start  allopurinol    Estimated Creatinine Clearance: 62.4 mL/min (based on SCr of 1.17 mg/dL).     Bone infection completed antibiotics  follow-up with infectious disease and neurosurgery,     Diabetes type 2 continue glipizide XL 2.5 mg daily, glargine 8 units subcu twice daily, semaglutide A1C 6/18/24 was 8.0    Hypertension on torsemide,  10 mg po daily  continue lisinopril    Electronically signed by: Darling Valdivia CNP       Sincerely,        Darling Valdivia CNP

## 2024-08-25 ENCOUNTER — LAB REQUISITION (OUTPATIENT)
Dept: LAB | Facility: CLINIC | Age: 79
End: 2024-08-25
Payer: COMMERCIAL

## 2024-08-25 DIAGNOSIS — Z51.81 ENCOUNTER FOR THERAPEUTIC DRUG LEVEL MONITORING: ICD-10-CM

## 2024-08-26 ENCOUNTER — TRANSITIONAL CARE UNIT VISIT (OUTPATIENT)
Dept: GERIATRICS | Facility: CLINIC | Age: 79
End: 2024-08-26
Payer: COMMERCIAL

## 2024-08-26 VITALS
TEMPERATURE: 97.5 F | WEIGHT: 193 LBS | HEIGHT: 72 IN | RESPIRATION RATE: 20 BRPM | OXYGEN SATURATION: 94 % | SYSTOLIC BLOOD PRESSURE: 139 MMHG | BODY MASS INDEX: 26.14 KG/M2 | HEART RATE: 89 BPM | DIASTOLIC BLOOD PRESSURE: 78 MMHG

## 2024-08-26 DIAGNOSIS — T81.30XA WOUND DEHISCENCE: ICD-10-CM

## 2024-08-26 DIAGNOSIS — M1A.00X0 IDIOPATHIC CHRONIC GOUT WITHOUT TOPHUS, UNSPECIFIED SITE: Primary | ICD-10-CM

## 2024-08-26 LAB
ERYTHROCYTE [DISTWIDTH] IN BLOOD BY AUTOMATED COUNT: 18.3 % (ref 10–15)
HCT VFR BLD AUTO: 38.2 % (ref 40–53)
HGB BLD-MCNC: 10.9 G/DL (ref 13.3–17.7)
MCH RBC QN AUTO: 27.2 PG (ref 26.5–33)
MCHC RBC AUTO-ENTMCNC: 28.5 G/DL (ref 31.5–36.5)
MCV RBC AUTO: 95 FL (ref 78–100)
PLATELET # BLD AUTO: 386 10E3/UL (ref 150–450)
RBC # BLD AUTO: 4.01 10E6/UL (ref 4.4–5.9)
WBC # BLD AUTO: 7.3 10E3/UL (ref 4–11)

## 2024-08-26 PROCEDURE — 36415 COLL VENOUS BLD VENIPUNCTURE: CPT | Mod: ORL | Performed by: NURSE PRACTITIONER

## 2024-08-26 PROCEDURE — 99309 SBSQ NF CARE MODERATE MDM 30: CPT | Performed by: NURSE PRACTITIONER

## 2024-08-26 PROCEDURE — 85027 COMPLETE CBC AUTOMATED: CPT | Mod: ORL | Performed by: NURSE PRACTITIONER

## 2024-08-26 PROCEDURE — P9604 ONE-WAY ALLOW PRORATED TRIP: HCPCS | Mod: ORL | Performed by: NURSE PRACTITIONER

## 2024-08-26 NOTE — PROGRESS NOTES
[facility] GERIATRICS    Chief Complaint   Patient presents with    Follow Up     HPI:  Lance Ibrahim is a 79 year old  (1945), who is being seen today for an episodic care visit at: [facility] ([facility]) [[id number]]. Today's concern is: Wound follow up after suture removal.     HPI from previous records: Status post Irrigation and debridement of the right AC joint, right olecranon bursa, right elbow joint, right thumb MCP joint, Open distal clavicle excision, right, on 8/7/24, with EBL of 50ccs by Dr. Hanson. Also status post ultrasound with aspiration of right knee. Pt was treated with both IV antibiotics and colchicine daily.  MRI Cervical-spine 8/10 without any acute findings, though spondylosis and stenosis were noted. Anticoagulation plan is aspirin twice daily for 42 days.    Colchicine was increased to twice daily, and ultimately decision made to monitor off of antibiotics, vancomycin discontinued on 8/12.  The patient started to have clinical improvement, on 8/15 for the first time he was able to have improved range of motion in his lower limbs, discussed with infectious disease and given clinic appointment could likely avoid starting steroids for gout (would start allopurinol after flare-up; likely for outpatient start).    Continued to improve off of steroids on higher dose of colchicine. He did not require steroids. When his acute flare of gout has resolved he should be started on allopurinol or other antigout agent.      Today: he is seen per request of primary NP to follow up on wound care. He had suture removal to the R shoulder, R elbow and R thumb late last week. The shoulder and thumb wound are healing quite well. Well approximated. No drainage, redness or warmth. The elbow has a small dehiscence of about 0.2cm with a small amount of yellow serous fluid draining onto bandage. He does ask me if this is a concern; it is not warm or red, no surrounding erythema and he denies pain.  Reports overall feeling well. Advised him and nursing to continue to change dressing when saturated and keep the area as dry as able, otherwise healing well.      ALLERGIES:Ancef [cefazolin], Cephalexin, Penicillins, and Sulfa antibiotics     PAST MEDICAL HISTORY:     Allergies, and PMH/PSH reviewed in Psychiatric today.  REVIEW OF SYSTEMS:  4 point ROS including Respiratory, CV, GI and , other than that noted in the HPI,  is negative    Objective:   /78   Pulse 89   Temp 97.5  F (36.4  C)   Resp 20   Ht 1.829 m (6')   Wt 87.5 kg (193 lb)   SpO2 94%   BMI 26.18 kg/m    General appearance: alert, cooperative.   Lungs: respirations unlabored  Extremities: extremities normal, atraumatic, no cyanosis or edema  Skin: Healing wounds to R shoulder, R elbow, R thumb; small dehisence to R elbow with small amount of yellow drainage.   Neurologic: oriented. No focal deficits.   Psych: interacts well with caregivers,  pleasant    Recent labs in Psychiatric reviewed by me today.     Assessment/Plan:  1. Idiopathic chronic gout without tophus, unspecified site    2. Wound dehiscence      -Keep wound dry as able. Change mepilex daily and PRN for saturation. Update MD/NP for increased drainage that is purulent, redness, warmth, pain or further dehiscence.     Electronically signed by: Carol Ann Porras CNP

## 2024-08-26 NOTE — LETTER
8/26/2024      Lance Ibrahim  [address]  [facility] [id number]        [facility]    Chief Complaint   Patient presents with     Follow Up     HPI:  Lance Ibrahim is a 79 year old  (1945), who is being seen today for an episodic care visit at: [facility] ([facility]) [[id number]]. Today's concern is: Wound follow up after suture removal.     HPI from previous records: Status post Irrigation and debridement of the right AC joint, right olecranon bursa, right elbow joint, right thumb MCP joint, Open distal clavicle excision, right, on 8/7/24, with EBL of 50ccs by Dr. Hanson. Also status post ultrasound with aspiration of right knee. Pt was treated with both IV antibiotics and colchicine daily.  MRI Cervical-spine 8/10 without any acute findings, though spondylosis and stenosis were noted. Anticoagulation plan is aspirin twice daily for 42 days.    Colchicine was increased to twice daily, and ultimately decision made to monitor off of antibiotics, vancomycin discontinued on 8/12.  The patient started to have clinical improvement, on 8/15 for the first time he was able to have improved range of motion in his lower limbs, discussed with infectious disease and given clinic appointment could likely avoid starting steroids for gout (would start allopurinol after flare-up; likely for outpatient start).    Continued to improve off of steroids on higher dose of colchicine. He did not require steroids. When his acute flare of gout has resolved he should be started on allopurinol or other antigout agent.      Today: he is seen per request of primary NP to follow up on wound care. He had suture removal to the R shoulder, R elbow and R thumb late last week. The shoulder and thumb wound are healing quite well. Well approximated. No drainage, redness or warmth. The elbow has a small dehiscence of about 0.2cm with a small amount of yellow serous fluid draining onto bandage. He does ask me if this is a  concern; it is not warm or red, no surrounding erythema and he denies pain. Reports overall feeling well. Advised him and nursing to continue to change dressing when saturated and keep the area as dry as able, otherwise healing well.      ALLERGIES:Ancef [cefazolin], Cephalexin, Penicillins, and Sulfa antibiotics     PAST MEDICAL HISTORY:     Allergies, and PMH/PSH reviewed in [facility] today.  REVIEW OF SYSTEMS:  4 point ROS including Respiratory, CV, GI and , other than that noted in the HPI,  is negative    Objective:   /78   Pulse 89   Temp 97.5  F (36.4  C)   Resp 20   Ht 1.829 m (6')   Wt 87.5 kg (193 lb)   SpO2 94%   BMI 26.18 kg/m    General appearance: alert, cooperative.   Lungs: respirations unlabored  Extremities: extremities normal, atraumatic, no cyanosis or edema  Skin: Healing wounds to R shoulder, R elbow, R thumb; small dehisence to R elbow with small amount of yellow drainage.   Neurologic: oriented. No focal deficits.   Psych: interacts well with caregivers,  pleasant    Recent labs in [facility] reviewed by me today.     Assessment/Plan:  1. Idiopathic chronic gout without tophus, unspecified site    2. Wound dehiscence      -Keep wound dry as able. Change mepilex daily and PRN for saturation. Update MD/NP for increased drainage that is purulent, redness, warmth, pain or further dehiscence.     Electronically signed by: Carol Ann Porras CNP       Sincerely,        Carol Ann Porras CNP

## 2024-08-30 ENCOUNTER — HOSPITAL ENCOUNTER (EMERGENCY)
Facility: CLINIC | Age: 79
Discharge: NURSING, REHAB OR LONG-TERM CARE | End: 2024-08-30
Attending: EMERGENCY MEDICINE | Admitting: EMERGENCY MEDICINE
Payer: COMMERCIAL

## 2024-08-30 ENCOUNTER — DOCUMENTATION ONLY (OUTPATIENT)
Dept: OTHER | Facility: CLINIC | Age: 79
End: 2024-08-30

## 2024-08-30 ENCOUNTER — TELEPHONE (OUTPATIENT)
Dept: GERIATRICS | Facility: CLINIC | Age: 79
End: 2024-08-30
Payer: COMMERCIAL

## 2024-08-30 VITALS
DIASTOLIC BLOOD PRESSURE: 78 MMHG | HEART RATE: 92 BPM | WEIGHT: 194 LBS | OXYGEN SATURATION: 95 % | SYSTOLIC BLOOD PRESSURE: 165 MMHG | RESPIRATION RATE: 20 BRPM | BODY MASS INDEX: 26.28 KG/M2 | TEMPERATURE: 98.2 F | HEIGHT: 72 IN

## 2024-08-30 DIAGNOSIS — T81.30XA WOUND DEHISCENCE: ICD-10-CM

## 2024-08-30 LAB
ERYTHROCYTE [DISTWIDTH] IN BLOOD BY AUTOMATED COUNT: 18.4 % (ref 10–15)
GLUCOSE BLDC GLUCOMTR-MCNC: 277 MG/DL (ref 70–99)
HCT VFR BLD AUTO: 34.6 % (ref 40–53)
HGB BLD-MCNC: 10.6 G/DL (ref 13.3–17.7)
MCH RBC QN AUTO: 27.5 PG (ref 26.5–33)
MCHC RBC AUTO-ENTMCNC: 30.6 G/DL (ref 31.5–36.5)
MCV RBC AUTO: 90 FL (ref 78–100)
PLATELET # BLD AUTO: 292 10E3/UL (ref 150–450)
RBC # BLD AUTO: 3.85 10E6/UL (ref 4.4–5.9)
WBC # BLD AUTO: 6.7 10E3/UL (ref 4–11)

## 2024-08-30 PROCEDURE — 36415 COLL VENOUS BLD VENIPUNCTURE: CPT | Performed by: EMERGENCY MEDICINE

## 2024-08-30 PROCEDURE — 85027 COMPLETE CBC AUTOMATED: CPT | Performed by: EMERGENCY MEDICINE

## 2024-08-30 PROCEDURE — 99283 EMERGENCY DEPT VISIT LOW MDM: CPT

## 2024-08-30 PROCEDURE — 82962 GLUCOSE BLOOD TEST: CPT

## 2024-08-30 ASSESSMENT — ACTIVITIES OF DAILY LIVING (ADL): ADLS_ACUITY_SCORE: 38

## 2024-08-30 NOTE — ED PROVIDER NOTES
EMERGENCY DEPARTMENT ENCOUNTER      NAME: Lance Ibrahim  AGE: 79 year old male  YOB: 1945  MRN: [id number]  EVALUATION DATE & TIME: No admission date for patient encounter.    PCP: Rickey Adamson    ED PROVIDER: Chau Glaser M.D.      Chief Complaint   Patient presents with    Post-op Problem         FINAL IMPRESSION:  1. Wound dehiscence          ED COURSE & MEDICAL DECISION MAKING:    Pertinent Labs & Imaging studies reviewed. (See chart for details)  79 year old male presents to the Emergency Department for evaluation of potential wound complications on his right elbow.  Review of records indicate patient hospitalized  8/8/2024 for presumptive septic arthritis of multiple sites.  Patient underwent debridement of the right thumb right distal clavicle and right elbow.  Findings however more consistent with acute gout.  Antibiotics were discontinued and patient responded to colchicine.  Patient scheduled for discharge from TCU today but was worried that the wound on his elbow had opened.  Exam reveals healthy appearing wound edges without spreading erythema or induration.  Minimally tenderness.  Patient is dehisced for approximately 2-1/2 cm of a 4 cm incision.  Routine wound care instructions given.  No indications for further evaluation.  Other incisions to the thumb and the shoulder are well-healed.. Patient appears non toxic with stable vitals signs. Overall exam is benign.        12:15 PM I met with the patient for the initial interview and physical examination. Discussed plan for treatment and workup in the ED. laboratory evaluation ordered from triage cancelled.  CBC returned prior to cancellation.  This is normal.    At the conclusion of the encounter I discussed the results of all of the tests and the disposition. The questions were answered and return precautions provided. The patient or family acknowledged understanding and was agreeable with the care plan.         Medical Decision  Making  Obtained supplemental history:Supplemental history obtained?: N/A  Reviewed external records: External Records Reviewed?: 08/04/2024 [facility].  Care impacted by chronic illness:Chronic Kidney Disease, Diabetes, Heart Disease, Hyperlipidemia, and Hypertension  Care significantly affected by social determinants of health:N/A  Did you consider but not order tests?: Work up considered but not performed and documented in chart, if applicable  Did you interpret images independently?: Independent interpretation of ECG and images noted in documentation, when applicable.  Consultation discussion with other provider:Did you involve another provider (consultant, , pharmacy, etc.)?: No  Discharge. No recommendations on prescription strength medication(s). See documentation for any additional details.  No MIPS measures identified.        MEDICATIONS GIVEN IN THE EMERGENCY:  Medications - No data to display    NEW PRESCRIPTIONS STARTED AT TODAY'S ER VISIT  New Prescriptions    No medications on file          =================================================================    HPI    Patient information was obtained from: Patient    Use of Intrepreter: N/A        Lance Ibrahim is a 79 year old male with a pertient medical history of hypertension, hyperlipidemia, DM type 2, s/p irrigation and debridement right upper extremity combined, s/p resect clavicle distal, s/p irrigation and debridement right shoulder, combined who presents to the ED for evaluation of post-op problem.    Patient presents with concern of his elbow incision from a surgery which he received on 08/07/2024. Patient reports that the stitches in his elbow came out on 08/21/2024 and states that an orange-yellow fluid was oozing out of the incision site since. Patient was supposed to be discharged from TCU to his home at [facility], but was not due to concern of wound.     Patient denies any other complaints at this  time.       Chart Review:  08/04/2024 - 08/16/2024: Patient was admitted to [facility] where he received a irrigation and debridement procedure. He presented to the hospital on 8/4/2024 with acute right shoulder pain which progressed to involve the right elbow, right thumb. MRI of the right shoulder showed signs of right acromioclavicular inflammation with subdeltoid bursitis, possible early osteomyelitis. Evaluated by orthopedic surgery. Plans were for I&D of the right shoulder and right elbow on 8/7. Anticoagulation plan is aspirin twice daily for 42 days. He did not require steroids. Procedure was successful, and patient was discharged on 08/16/2024.    REVIEW OF SYSTEMS   Constitutional:  Denies fever, chills  Respiratory:  Denies productive cough or increased work of breathing  Cardiovascular:  Denies chest pain, palpitations  GI:  Denies abdominal pain, nausea, vomiting, or change in bowel or bladder habits   Musculoskeletal:  Denies any new muscle/joint swelling  Skin:  Denies rash, reports healing incisions to right shoulder, thumb, and elbow region with oozing.   Neurologic:  Denies focal weakness  All systems negative except as marked.     PAST MEDICAL HISTORY:  Past Medical History:   Diagnosis Date    Anemia     Arthritis     osteoarthritis knees    BPH     CAD (coronary artery disease)     subtotal occlusion in the small distal LAD     Cardiomyopathy     Cerebral artery occlusion with cerebral infarction     TIA 1993, no residual    Cervicalgia     CHF (congestive heart failure) (H)     Chronic kidney disease     Chronic low back pain     Chronic rhinitis     Gouty arthropathy     hands    Hyperlipidemia     Hypertension     Kidney stone     Nocturia     Obesity     Osteomyelitis (H) 08/2023    Foot    PVD (peripheral vascular disease)     Sleep apnea     doesn't use cpap    TIA (transient ischaemic attack) 1993    Type 2 diabetes mellitus - 11/08/2018    Ureteral stone         PAST SURGICAL HISTORY:  Past Surgical History:   Procedure Laterality Date    AMPUTATE FOOT Right 08/07/2023    Procedure: AMPUTATION, right hallux;  Surgeon: Nakul Salazar DPM;  Location: [facility] OR    AMPUTATE TOE(S) Left 10/15/2018    Procedure: AMPUTATE TOE(S);  Left third toe amputation;  Surgeon: Ayaka Azevedo DPM, Podiatry/Foot and Ankle Surgery;  Location: [facility] OR    ARTHROPLASTY KNEE Right 12/07/2018    Procedure: Right total knee arthroplasty;  Surgeon: Issa Cunha MD;  Location:  OR    COLONOSCOPY  03/09/2013    Procedure: COLONOSCOPY;  COLONOSCOPY;  Surgeon: Chau Hogan MD;  Location:  GI    CV HEART CATHETERIZATION WITH POSSIBLE INTERVENTION Left 03/05/2019    Procedure: Coronary Angiogram;  Surgeon: Nas Linda MD;  Location:  HEART CARDIAC CATH LAB    Diastasis recti repair  1985    ENDOSCOPIC RETROGRADE CHOLANGIOPANCREATOGRAM N/A 04/30/2024    Procedure: ENDOSCOPIC RETROGRADE CHOLANGIOPANCREATOGRAPHY, BILIARY SPHINCTEROTOMY, DILATION, BRUSHING, BIOPSIES with DISTAL EXTRA HEPATIC BILE DUCT STRICTURE;  Surgeon: Aldo Gongora MD;  Location: [facility] OR    ESOPHAGOSCOPY, GASTROSCOPY, DUODENOSCOPY (EGD), COMBINED N/A 04/30/2024    Procedure: ESOPHAGOGASTRODUODENOSCOPY, WITH ENDOSCOPIC ULTRASOUND GUIDANCE;  Surgeon: Aldo Gongora MD;  Location: [facility] OR    EXTRACAPSULAR CATARACT EXTRATION WITH INTRAOCULAR LENS IMPLANT Left 03/13/2017    EXTRACAPSULAR CATARACT EXTRATION WITH INTRAOCULAR LENS IMPLANT Right     FOOT SURGERY  04/2013    cyst removal     HERNIA REPAIR  07/13/2004    ventral     IR DISC ASPIRATION INJECTION  6/19/2024    IRRIGATION AND DEBRIDEMENT SHOULDER, COMBINED Right 8/7/2024    Procedure: IRRIGATION AND DEBRIDEMENT, SHOULDER;  Surgeon: Terence Hanson MD;  Location: [facility] OR    IRRIGATION AND DEBRIDEMENT UPPER EXTREMITY, COMBINED Right 8/7/2024    Procedure: IRRIGATION AND DEBRIDEMENT, ELBOW AND  THUMB;  Surgeon: Terence Hanson MD;  Location: [facility] OR    ORIF Shoulder Left     PROSTATE SURGERY  02/2024    Urolift    RESECT CLAVICLE DISTAL Right 8/7/2024    Procedure: EXCISION, CLAVICLE, DISTAL;  Surgeon: Terence Hanson MD;  Location: [facility] OR    SPINAL CORD STIMULATOR IMPLANT  04/03/2024    Ventral hernia repair NOS  1987         CURRENT MEDICATIONS:    No current facility-administered medications for this encounter.    Current Outpatient Medications:     acetaminophen (TYLENOL) 325 MG tablet, Take 2 tablets (650 mg) by mouth every 6 hours as needed for pain (and adjunct with moderate or severe pain or per patient request), Disp: , Rfl:     aspirin 81 MG EC tablet, Take 1 tablet (81 mg) by mouth 2 times daily for 42 days Then return to once daily ., Disp: , Rfl:     calcium carbonate (TUMS) 500 MG chewable tablet, Take 1,000 mg by mouth 3 times daily as needed for heartburn, Disp: , Rfl:     colchicine (COLCRYS) 0.6 MG tablet, Take 1 tablet (0.6 mg) by mouth 2 times daily (Patient taking differently: Take 0.6 mg by mouth 2 times daily. Continue for one more week , discontinue and then start allopurinol 100 mg po daily), Disp: , Rfl:     diclofenac (VOLTAREN) 1 % topical gel, Apply 4 g topically 4 times daily as needed for moderate pain Left knee, Disp: , Rfl:     ferrous gluconate (FERGON) 324 (38 Fe) MG tablet, Take 1 tablet (324 mg) by mouth daily, Disp: , Rfl:     finasteride (PROSCAR) 5 MG tablet, Take 5 mg by mouth daily, Disp: , Rfl:     fluticasone (FLONASE) 50 MCG/ACT nasal spray, Spray 1-2 sprays in nostril daily as needed, Disp: , Rfl:     gabapentin (NEURONTIN) 300 MG capsule, Take 300 mg by mouth 3 times daily, Disp: , Rfl:     glipiZIDE (GLUCOTROL XL) 2.5 MG 24 hr tablet, Take 2.5 mg by mouth daily, Disp: , Rfl:     insulin glargine (LANTUS PEN) 100 UNIT/ML pen, Inject 8 Units Subcutaneous 2 times daily Hold if Blood sugar less than 100., Disp: , Rfl:  "    lactobacillus rhamnosus, GG, (CULTURELL) capsule, Take 2 capsules by mouth daily Noon, Disp: , Rfl:     Lidocaine (LIDOCARE) 4 % Patch, Place 1 patch onto the skin every 24 hours To prevent lidocaine toxicity, patient should be patch free for 12 hrs daily., Disp: , Rfl:     lisinopril (ZESTRIL) 10 MG tablet, Take 1 tablet (10 mg) by mouth every 24 hours, Disp: 90 tablet, Rfl: 3    loperamide (IMODIUM A-D) 2 MG tablet, Take 2 mg by mouth 4 times daily as needed for diarrhea, Disp: , Rfl:     methocarbamol (ROBAXIN) 500 MG tablet, Take 500 mg by mouth every 8 hours as needed for muscle spasms, Disp: , Rfl:     oxyCODONE (ROXICODONE) 5 MG tablet, Take 0.5-1 tablets (2.5-5 mg) by mouth every 6 hours as needed for moderate to severe pain (2.5 mg for moderate pain, 5 mg for severe pain.), Disp: 10 tablet, Rfl: 0    polyethylene glycol-propylene glycol (SYSTANE ULTRA) 0.4-0.3 % SOLN ophthalmic solution, 1 drop daily, Disp: , Rfl:     Semaglutide (RYBELSUS) 14 MG tablet, Take 14 mg by mouth daily, Disp: 30 tablet, Rfl: 3    sodium bicarbonate 650 MG tablet, Take 3 tablets (1,950 mg) by mouth 3 times daily, Disp: 180 tablet, Rfl: 1    torsemide (DEMADEX) 10 MG tablet, Take 1 tablet (10 mg) by mouth daily, Disp: , Rfl:     ursodiol (ACTIGALL) 300 MG capsule, Take 1 capsule (300 mg) by mouth 2 times daily, Disp: 60 capsule, Rfl: 0    ALLERGIES:  Allergies   Allergen Reactions    Ancef [Cefazolin] Rash    Cephalexin Itching and Rash     Allergic reaction required hospitalization 2024    Penicillins Anaphylaxis    Sulfa Antibiotics      \"itchy rash, swelling of face and hands\"       FAMILY HISTORY:  Family History   Problem Relation Age of Onset    Family History Negative Mother          88 yo    Cancer Father          76 yo brain    Diabetes Maternal Grandfather          91 yo    Alcohol/Drug Paternal Grandfather             Colon Cancer No family hx of        SOCIAL HISTORY:   Social " History     Socioeconomic History    Marital status:    Occupational History     Employer: SELF   Tobacco Use    Smoking status: Former     Current packs/day: 0.00     Types: Cigarettes     Quit date: 3/17/1993     Years since quittin.4     Passive exposure: Never    Smokeless tobacco: Never   Vaping Use    Vaping status: Never Used   Substance and Sexual Activity    Alcohol use: Not Currently    Drug use: No    Sexual activity: Never     Social Determinants of Health     Interpersonal Safety: Low Risk  (3/27/2024)    Interpersonal Safety     Do you feel physically and emotionally safe where you currently live?: Yes     Within the past 12 months, have you been hit, slapped, kicked or otherwise physically hurt by someone?: No     Within the past 12 months, have you been humiliated or emotionally abused in other ways by your partner or ex-partner?: No       VITALS:  Patient Vitals for the past 24 hrs:   BP Temp Temp src Pulse Resp SpO2 Height Weight   24 1138 (!) 165/78 98.2  F (36.8  C) Temporal 92 20 95 % 1.829 m (6') 88 kg (194 lb)        PHYSICAL EXAM    Constitutional:  Awake, alert, in no apparent distress  HENT:  Normocephalic, Atraumatic. Bilateral external ears normal. Oropharynx moist. Nose normal. Neck- Normal range of motion with no guarding, No midline cervical tenderness, Supple, No stridor.   Eyes:  PERRL, EOMI with no signs of entrapment, Conjunctiva normal, No discharge.   Respiratory:  Normal breath sounds, No respiratory distress, No wheezing.    Cardiovascular:  Normal heart rate, Normal rhythm, No appreciable rubs or gallops.   GI:  Soft, No tenderness, No distension, No palpable masses  Musculoskeletal:  Intact distal pulses, No edema. Good range of motion in all major joints. No tenderness to palpation or major deformities noted. Normal range of motion of elbow.   Integument:  Warm, Dry, No erythema, No rash. 4 cm incision on right elbow dehisced over 2.5 cm. No induration or  erythema. 1.5 cm incision on right thumb, 5 cm incision on right shoulder.   Neurologic:  Alert & oriented, Normal motor function, Normal sensory function, No focal deficits noted.   Psychiatric:  Affect normal, Judgment normal, Mood normal.     LAB:  All pertinent labs reviewed and interpreted.  Results for orders placed or performed during the hospital encounter of 08/30/24   Glucose by meter   Result Value Ref Range    GLUCOSE BY METER POCT 277 (H) 70 - 99 mg/dL   CBC (+ platelets, no diff)   Result Value Ref Range    WBC Count 6.7 4.0 - 11.0 10e3/uL    RBC Count 3.85 (L) 4.40 - 5.90 10e6/uL    Hemoglobin 10.6 (L) 13.3 - 17.7 g/dL    Hematocrit 34.6 (L) 40.0 - 53.0 %    MCV 90 78 - 100 fL    MCH 27.5 26.5 - 33.0 pg    MCHC 30.6 (L) 31.5 - 36.5 g/dL    RDW 18.4 (H) 10.0 - 15.0 %    Platelet Count 292 150 - 450 10e3/uL         I, Charanjit Ribeiro, am serving as a scribe to document services personally performed by Chau Glaser MD, based on my observation and the provider's statements to me. I, Chau Glaser MD attest that Charanjit Ribeiro is acting in a scribe capacity, has observed my performance of the services and has documented them in accordance with my direction.    Chau Glaser M.D.  Emergency Medicine  [facility] EMERGENCY ROOM     Chau Glaser MD  08/30/24 [id number]

## 2024-08-30 NOTE — ED TRIAGE NOTES
Pt arrived by EMS, pt had surgery 8/7/24 for gout to right elbow, has had stitches out and states the wound is not closing, has had some orange bloody discharge. Denies fevers, denies pus like drainage. PT states has been at [facility] TCU and was suppose to be discharge today but wanted this checked out. Pt is Type II diabetic.

## 2024-08-30 NOTE — TELEPHONE ENCOUNTER
"[facility] Geriatrics Triage Nurse Telephone Encounter    Provider: ZACH Mckenzie  Facility: [facility]  Facility Type:  TCU    Caller: Tamir  Call Back Number: [phone]    Allergies:    Allergies   Allergen Reactions    Ancef [Cefazolin] Rash    Cephalexin Itching and Rash     Allergic reaction required hospitalization 4/2024    Penicillins Anaphylaxis    Sulfa Antibiotics      \"itchy rash, swelling of face and hands\"        Reason for call: Nurse is calling to report that patient's sutures were removed the other day to the surgical incision on the right elbow per orders from the surgeon.  Nurse states that the wound was noted to be draining yesterday and today it is completely open and draining serosanguinous drainage.  The nurse attempted to contact the surgeon's office, but was only able to leave a voice message and the last time he did this, it took them a day to respond.  No redness or warmth noted.      Verbal Order/Direction given by Provider: Send patient to [facility] ER due to right elbow surgical wound dehiscence.      Provider giving Order:  ABDI Vega    Verbal Order given to: Tamir Brewer RN      "

## 2024-09-03 ENCOUNTER — TELEPHONE (OUTPATIENT)
Dept: FAMILY MEDICINE | Facility: CLINIC | Age: 79
End: 2024-09-03
Payer: COMMERCIAL

## 2024-09-03 ENCOUNTER — HOSPITAL ENCOUNTER (EMERGENCY)
Facility: CLINIC | Age: 79
Discharge: HOME | End: 2024-09-03
Admitting: PHYSICIAN ASSISTANT
Payer: COMMERCIAL

## 2024-09-03 ENCOUNTER — PATIENT OUTREACH (OUTPATIENT)
Dept: CARE COORDINATION | Facility: CLINIC | Age: 79
End: 2024-09-03
Payer: COMMERCIAL

## 2024-09-03 VITALS
RESPIRATION RATE: 18 BRPM | HEART RATE: 83 BPM | WEIGHT: 194 LBS | SYSTOLIC BLOOD PRESSURE: 166 MMHG | BODY MASS INDEX: 26.28 KG/M2 | OXYGEN SATURATION: 98 % | DIASTOLIC BLOOD PRESSURE: 81 MMHG | TEMPERATURE: 98.3 F | HEIGHT: 72 IN

## 2024-09-03 DIAGNOSIS — T81.31XD POSTOPERATIVE WOUND DEHISCENCE, SUBSEQUENT ENCOUNTER: ICD-10-CM

## 2024-09-03 DIAGNOSIS — Z04.9 SUSPECTED CONDITION NOT FOUND: ICD-10-CM

## 2024-09-03 PROCEDURE — 99282 EMERGENCY DEPT VISIT SF MDM: CPT

## 2024-09-03 ASSESSMENT — COLUMBIA-SUICIDE SEVERITY RATING SCALE - C-SSRS
6. HAVE YOU EVER DONE ANYTHING, STARTED TO DO ANYTHING, OR PREPARED TO DO ANYTHING TO END YOUR LIFE?: NO
2. HAVE YOU ACTUALLY HAD ANY THOUGHTS OF KILLING YOURSELF IN THE PAST MONTH?: NO
1. IN THE PAST MONTH, HAVE YOU WISHED YOU WERE DEAD OR WISHED YOU COULD GO TO SLEEP AND NOT WAKE UP?: NO

## 2024-09-03 NOTE — DISCHARGE INSTRUCTIONS
Your wound actually looks really good.  I am not concerned for infection.  You have no skin redness, profuse green/yellow malodorous drainage, pain, fevers, body aches, etc.  If any of these develop, I do want you to seek reevaluation.  For now, continue with the wound cares that you have been doing.

## 2024-09-03 NOTE — ED PROVIDER NOTES
EMERGENCY DEPARTMENT ENCOUNTER   NAME: Lance Ibrahim ; AGE: 79 year old male ; YOB: 1945 ; MRN: [id number] ; PCP: Rickey Adamson     Evaluation Date & Time: 9/3/2024  2:19 PM    ED Provider: Marcella Longo PA-C    CHIEF COMPLAINT     Wound Check      FINAL ASSESSMENT       ICD-10-CM    1. Suspected condition not found  Z04.9       2. Postoperative wound dehiscence, subsequent encounter  T81.31XD           ED COURSE, MEDICAL DECISION MAKING, PLAN     ED course     2:33 PM Evaluated patient. Performed physical exam. Discharge and follow up plans discussed. RN to discharge.   ______________________________________________________________________    Reason for visit: Lance Ibrahim is a 79 year old male with HTN, DM2, cardiomyopathy, CHF, CAD, CKD 3, gouty arthropathy, h/o CVA presenting for concerns about possible wound infection to the right elbow.  Had surgery for irrigation debridement in the beginning of August.  The wound dehisced a couple of days ago.  Nurse at his facility now has noticed some greenish drainage on his dressing and became concerned for infection and sent him here.  Patient has no symptoms including pain, fevers, body aches etc.    Exam: There is a 4 cm surgical site on the right olecranon with about 2 cm of dehisced tissue.  The wound actually looks really great.  Wound edges are clean and healthy.  Wound bed is clean and healthy.  There is no surrounding erythema.  No pain with palpation.  Patient can move the extremity freely.  Blood pressure elevated to 159/84, but otherwise vitally normal.  No suggestion of endorgan damage.    Acutely serious/life threatening considerations: Septic joint    Labs: N/A    EKG: N/A    Imaging: N/A    Consults: N/A    Interventions/recheck: N/A    Assessment: Wound dehiscence without evidence of infection.  The wound looks great.  There is no surrounding erythema.  There is no purulent drainage with exploring the tissue and applying pressure around  the opening.  There is no palpable pain, fluctuance, induration.  He has no constitutional signs or symptoms.  I certainly have no concern for active infection at this point.  The drainage that patient shows me on his dressing looks to be expected drainage with wound healing.  Overall, no red flag s/s present to suggest an acutely serious or life threatening condition that would necessitate hospitalization.     Plan: Continue with wound cares as you have been.  Follow-up with PCP as needed  Indications for re-evaluation in the ER discussed.   Patient/parent/guardian understanding and agreeable with the plan and will discharge to home in good condition.       *All pertinent lab & imaging studies independently reviewed. (See chart for details)   *Discussed the results of all the tests and plan with patient and family/guardians.       HISTORY OF PRESENT ILLNESS   Patient information was obtained from: Patient   Use of Intrepreter: None     Pertinent past medical history: HTN, DM2, cardiomyopathy, CHF, CAD, CKD 3, gouty arthropathy, h/o CVA    Lance Ibrahim is here by means of walk in  with family for evaluation of possible wound infection.     Patient tells me that he had a surgery on 3 different spots in the beginning of August which were thought to be septic arthritis.  Surgery actually showed that it was more consistent with gout.  He responded well to colchicine.  Was sent to a TCU.  On his last day of TCU, the right elbow wound had opened up.  He was then seen here and told it looked okay and just needed to heal on its own.    Today, one of his nurses at his facility said they were worried about infection because there was green/yellow material on his bandage.  They referred him here for reevaluation.    Patient denies any skin redness, pain, constitutional signs or symptoms.    No other concerns.    Per my chart review  8/30/2024: [facility] ER for left wound dehiscence.  There was about a 2-1/2 cm dehiscence from  a 4 cm incision.  Wound care instructions were discussed and he was discharged in good condition.  8/7/2024: [facility] for surgical irrigation and debridement of the right AC joint, right elbow, and right thumb.  Cultures ended up coming back showing no infection, but rather crystals consistent with gout.        MEDICAL HISTORY     Past Medical History:   Diagnosis Date    Anemia     Arthritis     BPH     CAD (coronary artery disease)     Cardiomyopathy     Cerebral artery occlusion with cerebral infarction     Cervicalgia     CHF (congestive heart failure) (H)     Chronic kidney disease     Chronic low back pain     Chronic rhinitis     Gouty arthropathy     Hyperlipidemia     Hypertension     Kidney stone     Nocturia     Obesity     Osteomyelitis (H) 08/2023    PVD (peripheral vascular disease)     Sleep apnea     TIA (transient ischaemic attack) 1993    Type 2 diabetes mellitus - 11/08/2018    Ureteral stone        Past Surgical History:   Procedure Laterality Date    AMPUTATE FOOT Right 08/07/2023    Procedure: AMPUTATION, right hallux;  Surgeon: Nakul Salazar DPM;  Location: [facility] OR    AMPUTATE TOE(S) Left 10/15/2018    Procedure: AMPUTATE TOE(S);  Left third toe amputation;  Surgeon: Ayaka Azevedo DPM, Podiatry/Foot and Ankle Surgery;  Location:  OR    ARTHROPLASTY KNEE Right 12/07/2018    Procedure: Right total knee arthroplasty;  Surgeon: Issa Cunha MD;  Location:  OR    COLONOSCOPY  03/09/2013    Procedure: COLONOSCOPY;  COLONOSCOPY;  Surgeon: Chau Hogan MD;  Location:  GI    CV HEART CATHETERIZATION WITH POSSIBLE INTERVENTION Left 03/05/2019    Procedure: Coronary Angiogram;  Surgeon: Nas Linda MD;  Location:  HEART CARDIAC CATH LAB    Diastasis recti repair  1985    ENDOSCOPIC RETROGRADE CHOLANGIOPANCREATOGRAM N/A 04/30/2024    Procedure: ENDOSCOPIC RETROGRADE CHOLANGIOPANCREATOGRAPHY, BILIARY SPHINCTEROTOMY, DILATION, BRUSHING, BIOPSIES with  DISTAL EXTRA HEPATIC BILE DUCT STRICTURE;  Surgeon: Aldo Gongora MD;  Location: [facility] OR    ESOPHAGOSCOPY, GASTROSCOPY, DUODENOSCOPY (EGD), COMBINED N/A 2024    Procedure: ESOPHAGOGASTRODUODENOSCOPY, WITH ENDOSCOPIC ULTRASOUND GUIDANCE;  Surgeon: Aldo Gongora MD;  Location: [facility] OR    EXTRACAPSULAR CATARACT EXTRATION WITH INTRAOCULAR LENS IMPLANT Left 2017    EXTRACAPSULAR CATARACT EXTRATION WITH INTRAOCULAR LENS IMPLANT Right     FOOT SURGERY  2013    cyst removal     HERNIA REPAIR  2004    ventral     IR DISC ASPIRATION INJECTION  2024    IRRIGATION AND DEBRIDEMENT SHOULDER, COMBINED Right 2024    Procedure: IRRIGATION AND DEBRIDEMENT, SHOULDER;  Surgeon: Terence Hanson MD;  Location: [facility] OR    IRRIGATION AND DEBRIDEMENT UPPER EXTREMITY, COMBINED Right 2024    Procedure: IRRIGATION AND DEBRIDEMENT, ELBOW AND THUMB;  Surgeon: Terence Hanson MD;  Location: [facility] OR    ORIF Shoulder Left     PROSTATE SURGERY  2024    Urolift    RESECT CLAVICLE DISTAL Right 2024    Procedure: EXCISION, CLAVICLE, DISTAL;  Surgeon: Terence Hanson MD;  Location: [facility] OR    SPINAL CORD STIMULATOR IMPLANT  2024    Ventral hernia repair NOS  1987       Family History   Problem Relation Age of Onset    Family History Negative Mother          88 yo    Cancer Father          76 yo brain    Diabetes Maternal Grandfather          89 yo    Alcohol/Drug Paternal Grandfather             Colon Cancer No family hx of        Social History     Tobacco Use    Smoking status: Former     Current packs/day: 0.00     Types: Cigarettes     Quit date: 3/17/1993     Years since quittin.4     Passive exposure: Never    Smokeless tobacco: Never   Vaping Use    Vaping status: Never Used   Substance Use Topics    Alcohol use: Not Currently    Drug use: No       acetaminophen (TYLENOL)  325 MG tablet  aspirin 81 MG EC tablet  calcium carbonate (TUMS) 500 MG chewable tablet  colchicine (COLCRYS) 0.6 MG tablet  diclofenac (VOLTAREN) 1 % topical gel  ferrous gluconate (FERGON) 324 (38 Fe) MG tablet  finasteride (PROSCAR) 5 MG tablet  fluticasone (FLONASE) 50 MCG/ACT nasal spray  gabapentin (NEURONTIN) 300 MG capsule  glipiZIDE (GLUCOTROL XL) 2.5 MG 24 hr tablet  insulin glargine (LANTUS PEN) 100 UNIT/ML pen  lactobacillus rhamnosus, GG, (CULTURELL) capsule  Lidocaine (LIDOCARE) 4 % Patch  lisinopril (ZESTRIL) 10 MG tablet  loperamide (IMODIUM A-D) 2 MG tablet  methocarbamol (ROBAXIN) 500 MG tablet  oxyCODONE (ROXICODONE) 5 MG tablet  polyethylene glycol-propylene glycol (SYSTANE ULTRA) 0.4-0.3 % SOLN ophthalmic solution  Semaglutide (RYBELSUS) 14 MG tablet  sodium bicarbonate 650 MG tablet  torsemide (DEMADEX) 10 MG tablet  ursodiol (ACTIGALL) 300 MG capsule          PHYSICAL EXAM     First Vitals:  Patient Vitals for the past 24 hrs:   BP Temp Temp src Pulse Resp SpO2 Height Weight   09/03/24 1434 (!) 166/81 -- -- 83 -- 98 % -- --   09/03/24 1423 (!) 177/84 -- -- 84 -- 99 % -- --   09/03/24 1209 (!) 159/84 98.3  F (36.8  C) Temporal 95 18 99 % 1.829 m (6') 88 kg (194 lb)       PHYSICAL EXAM:   Constitutional: No acute distress.  Neuro: Awake and alert. No focal deficits.  Psych: Calm and cooperative.  Eyes: PERRL. EOMI. Conjunctivae clear.   Cardio: Regular rate. Adequate perfusion to extremities.   Pulmonary: Oxygenating well on RA. No labored breathing.    Upper extremities: Patient has a 4 cm surgical incision site over the right olecranon process.  About 2 cm is dehisced.  There is no surrounding cellulitis.  There is no active purulent drainage.  The tissue looks clean and healthy.  He has no palpable pain to the area.  No areas of fluctuance or induration.  Moves freely. No edema. Distal pulses intact. Sensations intact.    Skin: See above.  All other skin appears natural color, warm, dry,  intact.       RESULTS     LAB:  All pertinent labs reviewed and interpreted  Labs Ordered and Resulted from Time of ED Arrival to Time of ED Departure - No data to display    RADIOLOGY:  No orders to display       ECG:  N/A      PROCEDURES     None         FINAL IMPRESSION:    ICD-10-CM    1. Suspected condition not found  Z04.9       2. Postoperative wound dehiscence, subsequent encounter  T81.31XD           MEDICATIONS GIVEN IN THE EMERGENCY DEPARTMENT:  Medications - No data to display    NEW PRESCRIPTIONS STARTED AT TODAY'S ED VISIT:  Discharge Medication List as of 9/3/2024  2:49 PM               MEDICAL DECISION MAKING:  Obtained supplemental history:Supplemental history obtained?: No  Reviewed external records: External records reviewed?: Outpatient Record: See HPI  Care impacted by chronic illness:Cerebrovascular Disease, Chronic Kidney Disease, Diabetes, Heart Disease, and Hypertension  Care significantly affected by social determinants of health:N/A  Did you consider but not order tests?: Work up considered but not performed and documented in chart, if applicable  Did you interpret images independently?: Independent interpretation of ECG and images noted in documentation, when applicable.  Consultation discussion with other provider:Did you involve another provider (consultant, , pharmacy, etc.)?: No  Discharge. No recommendations on prescription strength medication(s). See documentation for any additional details.      MIPS:  N/A      CRITICAL CARE:  N/A             Some or all of this documentation has been completed using dictation software and mild grammatical errors may be present. Please contact me with any concerns regarding this.       Marcella Longo PA-C  Emergency Medicine   [facility] EMERGENCY ROOM       Marcella Longo PA-C  09/03/24 [id number]

## 2024-09-03 NOTE — TELEPHONE ENCOUNTER
See provider's response, on 9/3/24, to home care order request on 9/3/24.    Writer called NISA Klein, with [facility] and relayed the above provider's response to Latoya, who verbalized understanding and agrees with the plan.    Denies other questions or concerns at this time.    Apple Ribeiro RN, BSN  [facility]

## 2024-09-03 NOTE — PROGRESS NOTES
Clinic Care Coordination Contact  Care Coordination Clinician Chart Review    Situation: Patient chart reviewed by care coordinator.    Background: Clinic Care Coordination Referral received from inpatient care team for transition handoff communication following hospital admission.    Assessment: Upon chart review, patient is not a candidate for Primary Care Clinic Care Coordination enrollment due to reason stated below:  Patient was discharged to TCU.  [facility] is following for discharge from TCU.    Plan/Recommendations: Clinic Care Coordination Referral/order cancelled. RN/[facility] will perform no further monitoring/outreaches at this time and will remain available as needed. If new needs arise, a new Care Coordination Referral may be placed.    Bonnie Kilpatrick,   [facility]  [phone]

## 2024-09-03 NOTE — TELEPHONE ENCOUNTER
Home Care is calling regarding an established patient with  [facility].        8/24/2023     4:07 PM   Home Care Information   Date of Home Care episode start 8/24/2023   Current following provider Needs to confirm    Name/Phone Number NISA Valladares Case Manager, [phone]   Home Care agency [facility]     Requesting orders from: Rickey Adamson  Provider is following patient: Yes  Is this a 60-day recertification request?  No    Orders Requested    Skilled Nursing  Request for delay in care, service is not able to be provided within same scheduled day.   Will reschedule start of care assessment for tomorrow, not today as ordered due to staffing.        Information was gathered and will be sent to provider for review.  RN will contact Home Care with information after provider review.  Confirmed ok to leave a detailed message with call back.    Shyla Newell RN

## 2024-09-03 NOTE — ED TRIAGE NOTES
Pt presents with purulent drainage, redness and swelling from left elbow where patient had surgery about a month ago. Stitches came out about 2 weeks ago. Reports dehiscence of the wound      Triage Assessment (Adult)       Row Name 09/03/24 1210          Triage Assessment    Airway WDL WDL        Respiratory WDL    Respiratory WDL WDL        Skin Circulation/Temperature WDL    Skin Circulation/Temperature WDL WDL        Cardiac WDL    Cardiac WDL WDL        Peripheral/Neurovascular WDL    Peripheral Neurovascular WDL WDL        Cognitive/Neuro/Behavioral WDL    Cognitive/Neuro/Behavioral WDL WDL

## 2024-09-04 ENCOUNTER — MEDICAL CORRESPONDENCE (OUTPATIENT)
Dept: HEALTH INFORMATION MANAGEMENT | Facility: CLINIC | Age: 79
End: 2024-09-04

## 2024-09-04 ENCOUNTER — TELEPHONE (OUTPATIENT)
Dept: FAMILY MEDICINE | Facility: CLINIC | Age: 79
End: 2024-09-04
Payer: COMMERCIAL

## 2024-09-04 DIAGNOSIS — Z09 HOSPITAL DISCHARGE FOLLOW-UP: ICD-10-CM

## 2024-09-04 NOTE — TELEPHONE ENCOUNTER
Home Care is calling regarding an established patient with [facility] [facility].        8/24/2023     4:07 PM   Home Care Information   Date of Home Care episode start 8/24/2023   Current following provider Needs to confirm    Name/Phone Number NISA Valladares Case Manager, [phone]   Home Care agency [facility]     Requesting orders from: Rikcey Adamson  Provider is following patient: Yes  Is this a 60-day recertification request?  No    Orders Requested    Skilled Nursing  Request for initial certification (first set of orders)   Frequency:  2x/wk for 1 wks  Then 3x/wk for 1 week  Then 2x/week for 3 weeks  And 2 PRN visits  For wound care of right elbow surgical incision     Information was gathered and will be sent to provider for review.  RN will contact Home Care with information after provider review.  Confirmed ok to leave a detailed message with call back.  Contact information confirmed and updated as needed.    Sharla Gómez RN

## 2024-09-06 NOTE — TELEPHONE ENCOUNTER
[facility] calling to check status-states they are supposed to see pt today for wound care but must have these orders.    Please advise asap

## 2024-09-06 NOTE — TELEPHONE ENCOUNTER
Called [facility] and relayed approval of verbal orders.     Luisito Romano RN  [facility]

## 2024-09-09 ENCOUNTER — TELEPHONE (OUTPATIENT)
Dept: FAMILY MEDICINE | Facility: CLINIC | Age: 79
End: 2024-09-09
Payer: COMMERCIAL

## 2024-09-09 NOTE — TELEPHONE ENCOUNTER
Home Care is calling regarding an established patient with  [facility].        8/24/2023     4:07 PM   Home Care Information   Date of Home Care episode start 8/24/2023   Current following provider Needs to confirm    Name/Phone Number NISA Valladares Case Manager, [phone]   Home Care agency [facility]     Requesting orders from: Rickey Adamson  Provider is following patient: Yes  Is this a 60-day recertification request?  No    Orders Requested    Occupational Therapy  Request for initial certification (first set of orders)   Frequency:  once a week for 2 weeks, the last visit will be a reassessment and request more from there if needed.       Information was gathered and will be sent to provider for review.  RN will contact Home Care with information after provider review.  Confirmed ok to leave a detailed message with call back.  Contact information confirmed and updated as needed.    Luisito Romano RN

## 2024-09-10 ENCOUNTER — OFFICE VISIT (OUTPATIENT)
Dept: FAMILY MEDICINE | Facility: CLINIC | Age: 79
End: 2024-09-10
Payer: COMMERCIAL

## 2024-09-10 VITALS
OXYGEN SATURATION: 98 % | TEMPERATURE: 98.7 F | HEART RATE: 80 BPM | DIASTOLIC BLOOD PRESSURE: 80 MMHG | WEIGHT: 194 LBS | BODY MASS INDEX: 26.28 KG/M2 | SYSTOLIC BLOOD PRESSURE: 160 MMHG | HEIGHT: 72 IN | RESPIRATION RATE: 20 BRPM

## 2024-09-10 DIAGNOSIS — Z79.4 TYPE 2 DIABETES MELLITUS WITH OTHER CIRCULATORY COMPLICATION, WITH LONG-TERM CURRENT USE OF INSULIN (H): ICD-10-CM

## 2024-09-10 DIAGNOSIS — E11.59 TYPE 2 DIABETES MELLITUS WITH OTHER CIRCULATORY COMPLICATION, WITH LONG-TERM CURRENT USE OF INSULIN (H): ICD-10-CM

## 2024-09-10 DIAGNOSIS — N13.8 HYPERTROPHY OF PROSTATE WITH URINARY OBSTRUCTION: ICD-10-CM

## 2024-09-10 DIAGNOSIS — D50.9 IRON DEFICIENCY ANEMIA, UNSPECIFIED IRON DEFICIENCY ANEMIA TYPE: ICD-10-CM

## 2024-09-10 DIAGNOSIS — Z00.00 ROUTINE HISTORY AND PHYSICAL EXAMINATION OF ADULT: ICD-10-CM

## 2024-09-10 DIAGNOSIS — N18.1 CHRONIC KIDNEY DISEASE, STAGE 1: ICD-10-CM

## 2024-09-10 DIAGNOSIS — I25.10 CORONARY ARTERY DISEASE INVOLVING NATIVE CORONARY ARTERY OF NATIVE HEART WITHOUT ANGINA PECTORIS: ICD-10-CM

## 2024-09-10 DIAGNOSIS — I10 PRIMARY HYPERTENSION: ICD-10-CM

## 2024-09-10 DIAGNOSIS — M17.12 OSTEOARTHRITIS OF LEFT KNEE, UNSPECIFIED OSTEOARTHRITIS TYPE: ICD-10-CM

## 2024-09-10 DIAGNOSIS — Z01.818 PRE-OP EXAMINATION: Primary | ICD-10-CM

## 2024-09-10 DIAGNOSIS — I63.433 CEREBROVASCULAR ACCIDENT (CVA) DUE TO BILATERAL EMBOLISM OF POSTERIOR CEREBRAL ARTERIES (H): ICD-10-CM

## 2024-09-10 DIAGNOSIS — M10.9 GOUTY ARTHROPATHY: ICD-10-CM

## 2024-09-10 DIAGNOSIS — I42.9 CARDIOMYOPATHY, UNSPECIFIED TYPE (H): ICD-10-CM

## 2024-09-10 DIAGNOSIS — N40.1 HYPERTROPHY OF PROSTATE WITH URINARY OBSTRUCTION: ICD-10-CM

## 2024-09-10 DIAGNOSIS — E66.01 MORBID OBESITY (H): ICD-10-CM

## 2024-09-10 PROBLEM — K83.1 BILIARY OBSTRUCTION (H): Status: ACTIVE | Noted: 2024-09-10

## 2024-09-10 PROBLEM — K83.1 COMMON BILE DUCT OBSTRUCTION (H): Status: ACTIVE | Noted: 2024-05-01

## 2024-09-10 PROBLEM — R53.1 WEAKNESS: Status: ACTIVE | Noted: 2024-08-16

## 2024-09-10 PROBLEM — M62.81 MUSCLE WEAKNESS (GENERALIZED): Status: ACTIVE | Noted: 2024-08-16

## 2024-09-10 LAB
ANION GAP SERPL CALCULATED.3IONS-SCNC: 11 MMOL/L (ref 7–15)
BUN SERPL-MCNC: 20.7 MG/DL (ref 8–23)
CALCIUM SERPL-MCNC: 9.1 MG/DL (ref 8.8–10.4)
CHLORIDE SERPL-SCNC: 102 MMOL/L (ref 98–107)
CREAT SERPL-MCNC: 1.34 MG/DL (ref 0.67–1.17)
EGFRCR SERPLBLD CKD-EPI 2021: 54 ML/MIN/1.73M2
ERYTHROCYTE [DISTWIDTH] IN BLOOD BY AUTOMATED COUNT: 18 % (ref 10–15)
GLUCOSE SERPL-MCNC: 200 MG/DL (ref 70–99)
HCO3 SERPL-SCNC: 26 MMOL/L (ref 22–29)
HCT VFR BLD AUTO: 33.6 % (ref 40–53)
HGB BLD-MCNC: 10.4 G/DL (ref 13.3–17.7)
MCH RBC QN AUTO: 28.1 PG (ref 26.5–33)
MCHC RBC AUTO-ENTMCNC: 31 G/DL (ref 31.5–36.5)
MCV RBC AUTO: 91 FL (ref 78–100)
PLATELET # BLD AUTO: 246 10E3/UL (ref 150–450)
POTASSIUM SERPL-SCNC: 3.7 MMOL/L (ref 3.4–5.3)
RBC # BLD AUTO: 3.7 10E6/UL (ref 4.4–5.9)
SODIUM SERPL-SCNC: 139 MMOL/L (ref 135–145)
WBC # BLD AUTO: 7.8 10E3/UL (ref 4–11)

## 2024-09-10 PROCEDURE — 99214 OFFICE O/P EST MOD 30 MIN: CPT

## 2024-09-10 PROCEDURE — 93005 ELECTROCARDIOGRAM TRACING: CPT

## 2024-09-10 PROCEDURE — 36415 COLL VENOUS BLD VENIPUNCTURE: CPT

## 2024-09-10 PROCEDURE — 80048 BASIC METABOLIC PNL TOTAL CA: CPT

## 2024-09-10 PROCEDURE — 85027 COMPLETE CBC AUTOMATED: CPT

## 2024-09-10 RX ORDER — ALLOPURINOL 100 MG/1
100 TABLET ORAL DAILY
COMMUNITY
Start: 2024-08-30

## 2024-09-10 ASSESSMENT — PAIN SCALES - GENERAL: PAINLEVEL: NO PAIN (0)

## 2024-09-10 NOTE — PROGRESS NOTES
Preoperative Evaluation  [facility]  [address]  [facility] [address]  Phone: [phone]  Fax: [phone]  Primary Provider: Rickey Adamson MD  Pre-op Performing Provider: RANJAN Rock CNP  Sep 10, 2024             9/8/2024   Surgical Information   What procedure is being done? Total left knee replacement   Facility or Hospital where procedure/surgery will be performed: [facility] in [place], [place]   Who is doing the procedure / surgery? Dr Michele Velasquez   Date of surgery / procedure: 09/16/2024   Time of surgery / procedure: 12 noon   Where do you plan to recover after surgery? Other       Fax number for surgical facility: Note does not need to be faxed, will be available electronically in Epic.    Assessment & Plan     The proposed surgical procedure is considered INTERMEDIATE risk.    Pre-op examination  Scheduled 9/16/2024 for total left knee arthroplasty with Dr. Velasquez at [facility].   Revised Cardiac Risk Index (RCRI)  The patient has the following serious cardiovascular risks for perioperative complications:   - Coronary Artery Disease (MI, positive stress test, angina, Qs on EKG) = 1 point   - Congestive Heart Failure (pulmonary edema, PND, s3 mariela, CXR with pulmonary congestion, basilar rales) = 1 point   - Cerebrovascular Disease (TIA or CVA) = 1 point   - Diabetes Mellitus (on Insulin) = 1 point   RCRI Interpretation: 3 points: Class IV (high risk - >11% complication rate)  - EKG 12-lead, tracing only  - CBC with platelets  - Basic metabolic panel    Osteoarthritis of left knee, unspecified osteoarthritis type  5/2023 xray of left knee  IMPRESSION: Moderate osteoarthrosis of the medial compartment. Mild osteoarthrosis of the lateral compartment. Severe osteoarthrosis of the patellofemoral compartment. Arterial calcifications. Mild lateral subluxation and tilt of the patella. Moderate   effusion. There is no evidence of fracture or calcified  intra-articular body.    Obesity (BMI 35.0-39.9) with comorbidity (H)    Coronary artery disease involving native coronary artery of native heart without angina pectoris  Cardiomyopathy, unspecified type (H)  5/17/2024 BNP 2058. Takes torsemide 10 mg daily.  8/12/2024 ECHO complete:   The left ventricle is normal in size. There is mild concentric left ventricular hypertrophy.  Left ventricular systolic function is normal. The visual ejection fraction is 55-60%. No regional wall motion abnormalities noted.  Grade II or moderate diastolic dysfunction.  The right ventricle is mild to moderately dilated. The right ventricular systolic function is normal.  There is mild (1+) aortic regurgitation.  There is sclerosis, calcification, and restriction of the aortic valve opening compatible with mild aortic stenosis.  Right ventricle systolic pressure estimate normal IVC diameter <2.1 cm collapsing >50% with sniff suggests a normal RA pressure of 3 mmHg.  No evidence for infective endocarditis within the limits of this study/modality.  Compared to prior study, there is no significant change.    Chronic kidney disease, stage 1  8/20/2024 creatinine 1.17 and GFR 63.     Primary hypertension  Blood pressure elevated in clinic 160/80.  Takes lisinopril 10 mg daily.  Take lisinopril  the morning of surgery.  Limit salt intake.      Mixed hyperlipidemia  Hyperlipidemia managed with atorvastatin 20 mg.  4/2023 LDL 39.     Iron deficiency anemia, unspecified iron deficiency anemia type  Takes daily iron supplementation.    Type 2 diabetes mellitus with other circulatory complication, with long-term current use of insulin (H)  Type 2 diabetes with insulin dependence.  Diabetic complications include neuropathy, foot ulcer, left amputated toe.  Takes lantus 8 units daily and glipizide 2.5 mg.  Takes gabapentin 300 mg TID for diabetic neuropathy.  6/18/2024 A1c 8.0    Cerebrovascular accident (CVA) due to bilateral embolism of posterior  cerebral arteries (H)  TIA in 1993.  Takes aspirin 81 mg daily.  No subsequent TIA or stroke.    Hypertrophy of prostate with urinary obstruction  BPH managed with finasteride 5 mg.     Gouty arthropathy  Gout previously managed with colchicine.  Will be transitioning to daily allopurinol.  Recommend patient to take colchicine with allopurinol for 3 months to prevent acute gout flares during transition.    MED REC REQUIRED  Post Medication Reconciliation Status:     Possible Sleep Apnea: Sleep study did not show BRIANNA.       Implanted Device   - Type of device: Spinal cord stimulator Patient advised to bring device information on day of surgery.     - No identified additional risk factors other than previously addressed    Antiplatelet or Anticoagulation Medication Instructions   - aspirin: Bleeding risk is low for this procedure and daily aspirin may be continued without modification.     Additional Medication Instructions   - ACE/ARB: Continue without modification (e.g., MAC anesthesia, neurosurgery, spine surgery, heart failure, or labile hypertension with risk of hypertension).   - Diuretics: May continue due to heart failure.   - Long acting insulin (e.g. glargine, detemir): Take 80% of the usual evening or morning dose before surgery.     - sulfonylurea (e.g. glyburide, glipizide): DO NOT TAKE day of surgery   - ibuprofen (Advil, Motrin): DO NOT TAKE 1 day before surgery.     Recommendation  Approval given to proceed with proposed procedure, without further diagnostic evaluation.    Bandar Lucas is a 79 year old, presenting for the following:  Pre-Op Exam        9/10/2024    10:38 AM   Additional Questions   Roomed by SANDRA Mcbride related to upcoming procedure:   Patient is a 79-year-old male who presents for preop exam.  Medical history includes HTN, type 2 diabetes, cardiomyopathy, CHF, CAD, CKD, gouty arthropathy, CVA, TIA (1993).  Scheduled 9/16/2024 for total left knee arthroplasty with   Eva at [facility].  No prior complications with anesthesia.  Denies current chest pain, dyspnea, lightheadedness.  Has +1 peripheral edema.    Hypertension managed with lisinopril 10 mg and torsemide 10 mg daily.    CKD: 8/20/2024 creatinine 1.17 and GFR 63.   Type 2 diabetes with insulin dependence.  Diabetic complications include neuropathy, foot ulcer, left amputated toe.  Takes lantus 8 units daily and glipizide 2.5 mg.  Takes gabapentin 300 mg TID for diabetic neuropathy.  6/18/2024 A1c 8.0  BPH managed with finasteride 5 mg.   History of iron deficiency anemia.  Takes iron supplement daily.  8/26/2024 hemoglobin 10.9 and hematocrit 38.2.    Gout previously managed with colchicine.  Will be transitioning to daily allopurinol.  Recommend patient to take colchicine with allopurinol for 3 months to prevent acute gout flares during transition.  Hyperlipidemia managed with atorvastatin 20 mg.  4/2023 LDL 39.   CAD takes aspirin 81 mg.         9/8/2024   Pre-Op Questionnaire   Have you ever had a heart attack or stroke? YES TIA 1993   Have you ever had surgery on your heart or blood vessels, such as a stent placement, a coronary artery bypass, or surgery on an artery in your head, neck, heart, or legs? No   Do you have chest pain with activity? No   Do you have a history of heart failure? No, patient states he does not have history of heart failure.  5/17/2024 BNP 2058. Takes torsemide 10 mg daily.   Do you currently have a cold, bronchitis or symptoms of other infection? No   Do you have a cough, shortness of breath, or wheezing? No   Do you or anyone in your family have previous history of blood clots? No   Do you or does anyone in your family have a serious bleeding problem such as prolonged bleeding following surgeries or cuts? No   Have you ever had problems with anemia or been told to take iron pills? (!) YES currently taking iron supplementation- has been on for 10 years   Have you had any abnormal blood  loss such as black, tarry or bloody stools? No   Have you ever had a blood transfusion? No   Are you willing to have a blood transfusion if it is medically needed before, during, or after your surgery? Yes   Have you or any of your relatives ever had problems with anesthesia? No   Do you have sleep apnea, excessive snoring or daytime drowsiness? (!) YES, has sleep issue but not sleep apnea   Do you have a CPAP machine? (!) NO    Do you have any artifical heart valves or other implanted medical devices like a pacemaker, defibrillator, or continuous glucose monitor? (!) YES, spinal cord stimulator for neuropathy 4/2023   What type of device do you have? Spinal cord stimulator   Name of the clinic that manages your device [facility] pain clinic   Do you have artificial joints? (!) YES, right knee 2019   Are you allergic to latex? No      Health Care Directive  Patient has a Health Care Directive on file    Preoperative Review of    reviewed - controlled substances reflected in medication list.      Patient Active Problem List    Diagnosis Date Noted    Type 2 diabetes, HbA1c goal < 7% (H) 02/22/2013     Priority: High    Hypertension 07/06/2008     Priority: High    Transient cerebral ischemia 07/09/2004     Priority: High     Problem list name updated by automated process. Provider to review      Biliary obstruction (H28) 09/10/2024     Priority: Medium    Muscle weakness (generalized) 08/16/2024     Priority: Medium    Weakness 08/16/2024     Priority: Medium    Acute pain of right shoulder 08/04/2024     Priority: Medium    Fever, unspecified fever cause 08/04/2024     Priority: Medium    Anemia, unspecified 06/25/2024     Priority: Medium    Chronic kidney disease, stage 1 06/25/2024     Priority: Medium    Extradural and subdural abscess, unspecified 06/25/2024     Priority: Medium    Spinal stenosis, site unspecified 06/25/2024     Priority: Medium    Back pain 06/18/2024     Priority: Medium    Bacteremia  05/19/2024     Priority: Medium    Acute cystitis without hematuria 05/19/2024     Priority: Medium    Hypoxia 05/16/2024     Priority: Medium    Fall, initial encounter 05/16/2024     Priority: Medium    Common bile duct obstruction (H28) 05/01/2024     Priority: Medium    Abdominal pain 04/30/2024     Priority: Medium    Hyperglycemia 04/26/2024     Priority: Medium    Elevated LFTs 04/26/2024     Priority: Medium    Elevated lipase 04/26/2024     Priority: Medium    Diffuse papular rash 04/26/2024     Priority: Medium    Enlarged prostate 04/16/2024     Priority: Medium    Cerebrovascular accident (CVA) due to bilateral embolism of posterior cerebral arteries (H) 03/04/2024     Priority: Medium    Hypertensive heart and kidney disease 03/04/2024     Priority: Medium    Insomnia 03/04/2024     Priority: Medium    Iron deficiency anemia 03/04/2024     Priority: Medium    Osteoarthritis of left knee 03/04/2024     Priority: Medium    Post-traumatic osteoarthritis 03/04/2024     Priority: Medium    Coronary artery disease involving native coronary artery of native heart without angina pectoris 08/10/2023     Priority: Medium    Chronic systolic congestive heart failure (H) 08/08/2023     Priority: Medium    Cellulitis and abscess of foot excluding toe 07/25/2023     Priority: Medium    Slowing of urinary stream 02/01/2023     Priority: Medium    Nocturia 02/01/2023     Priority: Medium    Diabetic ulcer of toe of right foot associated with diabetes mellitus due to underlying condition, limited to breakdown of skin (H) 01/26/2023     Priority: Medium    Muscle pain 12/19/2022     Priority: Medium    Diabetic neuropathy associated with type 2 diabetes mellitus (H) 10/17/2022     Priority: Medium    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 10/14/2022     Priority: Medium    Cellulitis of right lower extremity 08/25/2022     Priority: Medium    H/O amputation of lesser toe, left (H24) 01/26/2022     Priority: Medium    Acute  idiopathic gout of right hand 12/27/2021     Priority: Medium    Other constipation 12/09/2021     Priority: Medium    Acute renal insufficiency 12/03/2021     Priority: Medium    Acute right-sided thoracic back pain 12/03/2021     Priority: Medium    Ulcerated, foot, unspecified laterality, limited to breakdown of skin (H) 07/28/2021     Priority: Medium    Ulcerated, foot (H) 07/27/2020     Priority: Medium    Obesity (BMI 35.0-39.9) with comorbidity (H) 02/05/2020     Priority: Medium    Stage 3b chronic kidney disease (H) 02/05/2020     Priority: Medium    Shortness of breath 03/04/2019     Priority: Medium     Added automatically from request for surgery [id number]      Renal colic 03/04/2019     Priority: Medium    Dyspnea on exertion 02/22/2019     Priority: Medium     Added automatically from request for surgery [id number]      Chest pain, unspecified type 02/22/2019     Priority: Medium     Added automatically from request for surgery [id number]      Cardiomyopathy, unspecified type (H) 02/22/2019     Priority: Medium     Added automatically from request for surgery [id number]      Skin rash 12/08/2018     Priority: Medium    Total knee replacement status 12/07/2018     Priority: Medium    Amputated toe, left (H24) 11/08/2018     Priority: Medium    Severe sepsis (H) 05/01/2018     Priority: Medium    Cervicalgia 02/24/2017     Priority: Medium    Lower urinary tract symptoms 04/07/2016     Priority: Medium    Type 2 diabetes mellitus with peripheral vascular disease (H) 07/09/2013     Priority: Medium    Gouty arthropathy 07/09/2013     Priority: Medium    CARDIOVASCULAR SCREENING; LDL GOAL LESS THAN 100 10/31/2010     Priority: Medium    Hypertrophy of prostate with urinary obstruction 07/09/2004     Priority: Medium     Problem list name updated by automated process. Provider to review      Ventral hernia 06/28/2004     Priority: Medium     Problem list name updated by automated process. Provider to review      Chronic  low back pain 01/01/2000     Priority: Medium    Gout 07/20/2012     Priority: Low    Chronic rhinitis 07/09/2004     Priority: Low      Past Medical History:   Diagnosis Date    Anemia     Arthritis     osteoarthritis knees    BPH     CAD (coronary artery disease)     subtotal occlusion in the small distal LAD     Cardiomyopathy     Cerebral artery occlusion with cerebral infarction     TIA 1993, no residual    Cervicalgia     CHF (congestive heart failure) (H)     Chronic kidney disease     Chronic low back pain     Chronic rhinitis     Gouty arthropathy     hands    Hyperlipidemia     Hypertension     Kidney stone     Nocturia     Obesity     Osteomyelitis (H) 08/2023    Foot    PVD (peripheral vascular disease)     Sleep apnea     doesn't use cpap    TIA (transient ischaemic attack) 1993    Type 2 diabetes mellitus - 11/08/2018    Ureteral stone      Past Surgical History:   Procedure Laterality Date    AMPUTATE FOOT Right 08/07/2023    Procedure: AMPUTATION, right hallux;  Surgeon: Nakul Salazar DPM;  Location: [facility] OR    AMPUTATE TOE(S) Left 10/15/2018    Procedure: AMPUTATE TOE(S);  Left third toe amputation;  Surgeon: Ayaka Azevedo DPM, Podiatry/Foot and Ankle Surgery;  Location:  OR    ARTHROPLASTY KNEE Right 12/07/2018    Procedure: Right total knee arthroplasty;  Surgeon: Issa Cunha MD;  Location:  OR    COLONOSCOPY  03/09/2013    Procedure: COLONOSCOPY;  COLONOSCOPY;  Surgeon: Chau Hogan MD;  Location:  GI    CV HEART CATHETERIZATION WITH POSSIBLE INTERVENTION Left 03/05/2019    Procedure: Coronary Angiogram;  Surgeon: Nas Linda MD;  Location:  HEART CARDIAC CATH LAB    Diastasis recti repair  1985    ENDOSCOPIC RETROGRADE CHOLANGIOPANCREATOGRAM N/A 04/30/2024    Procedure: ENDOSCOPIC RETROGRADE CHOLANGIOPANCREATOGRAPHY, BILIARY SPHINCTEROTOMY, DILATION, BRUSHING, BIOPSIES with DISTAL EXTRA HEPATIC BILE DUCT STRICTURE;  Surgeon: Aldo Gongora MD;   Location: [facility] OR    ESOPHAGOSCOPY, GASTROSCOPY, DUODENOSCOPY (EGD), COMBINED N/A 04/30/2024    Procedure: ESOPHAGOGASTRODUODENOSCOPY, WITH ENDOSCOPIC ULTRASOUND GUIDANCE;  Surgeon: Aldo Gongora MD;  Location: [facility] OR    EXTRACAPSULAR CATARACT EXTRATION WITH INTRAOCULAR LENS IMPLANT Left 03/13/2017    EXTRACAPSULAR CATARACT EXTRATION WITH INTRAOCULAR LENS IMPLANT Right     FOOT SURGERY  04/2013    cyst removal     HERNIA REPAIR  07/13/2004    ventral     IR DISC ASPIRATION INJECTION  6/19/2024    IRRIGATION AND DEBRIDEMENT SHOULDER, COMBINED Right 8/7/2024    Procedure: IRRIGATION AND DEBRIDEMENT, SHOULDER;  Surgeon: Terence Hanson MD;  Location: [facility] OR    IRRIGATION AND DEBRIDEMENT UPPER EXTREMITY, COMBINED Right 8/7/2024    Procedure: IRRIGATION AND DEBRIDEMENT, ELBOW AND THUMB;  Surgeon: Terence Hanson MD;  Location: [facility] OR    ORIF Shoulder Left     PROSTATE SURGERY  02/2024    Urolift    RESECT CLAVICLE DISTAL Right 8/7/2024    Procedure: EXCISION, CLAVICLE, DISTAL;  Surgeon: Terence Hanson MD;  Location: [facility] OR    SPINAL CORD STIMULATOR IMPLANT  04/03/2024    Ventral hernia repair NOS  1987     Current Outpatient Medications   Medication Sig Dispense Refill    acetaminophen (TYLENOL) 325 MG tablet Take 2 tablets (650 mg) by mouth every 6 hours as needed for pain (and adjunct with moderate or severe pain or per patient request)      aspirin 81 MG EC tablet Take 1 tablet (81 mg) by mouth 2 times daily for 42 days Then return to once daily .      calcium carbonate (TUMS) 500 MG chewable tablet Take 1,000 mg by mouth 3 times daily as needed for heartburn      diclofenac (VOLTAREN) 1 % topical gel Apply 4 g topically 4 times daily as needed for moderate pain Left knee      ferrous gluconate (FERGON) 324 (38 Fe) MG tablet Take 1 tablet (324 mg) by mouth daily      finasteride (PROSCAR) 5 MG tablet Take 5 mg by mouth daily       fluticasone (FLONASE) 50 MCG/ACT nasal spray Spray 1-2 sprays in nostril daily as needed      gabapentin (NEURONTIN) 300 MG capsule Take 300 mg by mouth 3 times daily      glipiZIDE (GLUCOTROL XL) 2.5 MG 24 hr tablet Take 2.5 mg by mouth daily      insulin glargine (LANTUS PEN) 100 UNIT/ML pen Inject 8 Units Subcutaneous 2 times daily Hold if Blood sugar less than 100.      lactobacillus rhamnosus, GG, (CULTURELL) capsule Take 2 capsules by mouth daily Noon      Lidocaine (LIDOCARE) 4 % Patch Place 1 patch onto the skin every 24 hours To prevent lidocaine toxicity, patient should be patch free for 12 hrs daily.      lisinopril (ZESTRIL) 10 MG tablet Take 1 tablet (10 mg) by mouth every 24 hours 90 tablet 3    loperamide (IMODIUM A-D) 2 MG tablet Take 2 mg by mouth 4 times daily as needed for diarrhea      methocarbamol (ROBAXIN) 500 MG tablet Take 500 mg by mouth every 8 hours as needed for muscle spasms      oxyCODONE (ROXICODONE) 5 MG tablet Take 0.5-1 tablets (2.5-5 mg) by mouth every 6 hours as needed for moderate to severe pain (2.5 mg for moderate pain, 5 mg for severe pain.) 10 tablet 0    polyethylene glycol-propylene glycol (SYSTANE ULTRA) 0.4-0.3 % SOLN ophthalmic solution 1 drop daily      sodium bicarbonate 650 MG tablet Take 3 tablets (1,950 mg) by mouth 3 times daily 180 tablet 1    torsemide (DEMADEX) 10 MG tablet Take 1 tablet (10 mg) by mouth daily      ursodiol (ACTIGALL) 300 MG capsule Take 1 capsule (300 mg) by mouth 2 times daily 60 capsule 0    allopurinol (ZYLOPRIM) 100 MG tablet Take 100 mg by mouth daily. (Patient not taking: Reported on 9/10/2024)      colchicine (COLCRYS) 0.6 MG tablet Take 1 tablet (0.6 mg) by mouth 2 times daily (Patient not taking: Reported on 9/10/2024)      Semaglutide (RYBELSUS) 14 MG tablet Take 14 mg by mouth daily 30 tablet 3     Allergies   Allergen Reactions    Ancef [Cefazolin] Rash    Cephalexin Itching and Rash     Allergic reaction required  "hospitalization 2024    Penicillins Anaphylaxis    Sulfa Antibiotics      \"itchy rash, swelling of face and hands\"      Social History     Tobacco Use    Smoking status: Former     Current packs/day: 0.00     Types: Cigarettes     Quit date: 3/17/1993     Years since quittin.5     Passive exposure: Never    Smokeless tobacco: Never   Substance Use Topics    Alcohol use: Not Currently     Family History   Problem Relation Age of Onset    Family History Negative Mother          86 yo    Cancer Father          74 yo brain    Diabetes Maternal Grandfather          91 yo    Alcohol/Drug Paternal Grandfather             Colon Cancer No family hx of      History   Drug Use No     Review of Systems  Review of systems negative otherwise known HPI.    Objective    BP (!) 160/80   Pulse 80   Temp 98.7  F (37.1  C) (Temporal)   Resp 20   Ht 1.829 m (6')   Wt 88 kg (194 lb)   SpO2 98%   BMI 26.31 kg/m     Estimated body mass index is 26.31 kg/m  as calculated from the following:    Height as of this encounter: 1.829 m (6').    Weight as of this encounter: 88 kg (194 lb).  Physical Exam  General: Alert, oriented, no acute distress.    Head: Normocephalic and atraumatic.   Eyes: Conjunctiva and sclera clear. SHEREEN.   Ears: External ear nontender. TMs intact and clear. Grossly normal hearing.   Oropharynx: Dentition intact. Oral and posterior pharynx pink and moist.     Neck: Supple, no masses or nodes. No adenopathy.    Respiratory: Lungs clear, unlabored. No rales, rhonchi, or wheezes.    Cardiovascular: Regular rate and rhythm, S1 and S2. No murmurs. +1 bilateral  peripheral edema.     Gastrointestinal: Normoactive bowel sounds. Soft, nontender, no organomegaly.     Musculoskeletal: Left knee: Pain along anterior joint line.  Skin: No suspicious lesions, rashes, or abnormalities.    Neurologic: No gross motor or sensory deficit. Mentation intact and speech normal.     Psychiatric: " Appropriate affect.     Recent Labs   Lab Test 08/30/24  1210 08/26/24  0734 08/20/24  0629 08/12/24  0442 06/20/24  0829 06/19/24  1316 06/19/24  0710 06/18/24  0845 04/26/24  0852 04/26/24  0430 03/27/24  1338 02/27/24  1113   HGB 10.6* 10.9* 9.4* 8.6*   < >  --    < > 9.7*   < > 11.9*   < > 12.3*    386 467*  --    < >  --    < > 284   < > 253   < > 234   INR  --   --   --   --   --  1.04  --   --   --  1.11  --   --    NA  --   --  141 140   < >  --    < > 137   < > 138   < >  --    POTASSIUM  --   --  4.8 3.9   < >  --    < > 4.1   < > 3.8   < > 4.7   CR  --   --  1.17 1.06   < >  --    < > 1.25*   < > 2.05*   < >  --    A1C  --   --   --   --   --   --   --  8.0*  --   --   --  8.0*    < > = values in this interval not displayed.      Diagnostics  Labs pending at this time.  Results will be reviewed when available.   EKG required for known coronary heart disease and structural heart disease and not completed in the last 90 days.     Revised Cardiac Risk Index (RCRI)  The patient has the following serious cardiovascular risks for perioperative complications:   - Coronary Artery Disease (MI, positive stress test, angina, Qs on EKG) = 1 point   - Congestive Heart Failure (pulmonary edema, PND, s3 mariela, CXR with pulmonary congestion, basilar rales) = 1 point   - Cerebrovascular Disease (TIA or CVA) = 1 point   - Diabetes Mellitus (on Insulin) = 1 point     RCRI Interpretation: 3 points: Class IV (high risk - >11% complication rate)    Estimated Functional Capacity: Performs 4 METS exercise without symptoms (e.g., light housework, stairs, 4 mph walk, 7 mph bike, slow step dance)      Signed Electronically by: RANJAN Rock CNP  A copy of this evaluation report is provided to the requesting physician.

## 2024-09-10 NOTE — TELEPHONE ENCOUNTER
See provider's response, on 9/10/24, to home care order request from 9/9/24.    Writer called Anne, from [facility] and left a detailed message regarding the above provider's message.    If Anne calls back, please relay the above provider's message to Anne.    Please route to the RN queue for any questions or concerns.    Apple Ribeiro RN, BSN  [facility]

## 2024-09-11 ENCOUNTER — PATIENT OUTREACH (OUTPATIENT)
Dept: CARE COORDINATION | Facility: CLINIC | Age: 79
End: 2024-09-11
Payer: COMMERCIAL

## 2024-09-11 LAB
ATRIAL RATE - MUSE: 69 BPM
DIASTOLIC BLOOD PRESSURE - MUSE: NORMAL MMHG
INTERPRETATION ECG - MUSE: NORMAL
P AXIS - MUSE: 62 DEGREES
PR INTERVAL - MUSE: 202 MS
QRS DURATION - MUSE: 118 MS
QT - MUSE: 402 MS
QTC - MUSE: 430 MS
R AXIS - MUSE: -35 DEGREES
SYSTOLIC BLOOD PRESSURE - MUSE: NORMAL MMHG
T AXIS - MUSE: 31 DEGREES
VENTRICULAR RATE- MUSE: 69 BPM

## 2024-09-11 PROCEDURE — 93010 ELECTROCARDIOGRAM REPORT: CPT | Performed by: INTERNAL MEDICINE

## 2024-09-11 NOTE — PROGRESS NOTES
Contact   Chart Review     Situation: Patient chart reviewed by .    Background: following for discharge from TCU.     Assessment: patient is now living at [facility] and completed a PCP appt since discharging from TCU.  PCP completed assessment.     Plan/Recommendations: no outreach will be completed.      Bonnie Kilpatrick,   [facility]  [phone]

## 2024-09-16 ENCOUNTER — TELEPHONE (OUTPATIENT)
Dept: FAMILY MEDICINE | Facility: CLINIC | Age: 79
End: 2024-09-16
Payer: COMMERCIAL

## 2024-09-16 NOTE — TELEPHONE ENCOUNTER
FYI - Status Update    Who is Calling: NurseAugustin from [facility]     Update: Called to report patients BP was 158/90 and asymptomatic on 9/9/24. They have order to report any elevated BP.     Does caller want a call/response back: No, unless there is new orders to relay.     Luisito Romano RN  [facility]

## 2024-09-17 NOTE — TELEPHONE ENCOUNTER
Writer called NISA Ruffin and left message to return call to clinic.     If NISA Ruffin returns call please relay provider notes and recommendations. Please route to nurse queue if there are any further questions or concerns.     PASTORA GarciaN, RN  [facility]

## 2024-09-18 NOTE — TELEPHONE ENCOUNTER
Called and relayed messaged to Augustin PAULA with [facility]. Augustin verbalized understanding and has no further questions at this time.     Luisito Romano RN  [facility]

## 2024-09-19 ENCOUNTER — MYC MEDICAL ADVICE (OUTPATIENT)
Dept: FAMILY MEDICINE | Facility: CLINIC | Age: 79
End: 2024-09-19
Payer: COMMERCIAL

## 2024-09-19 DIAGNOSIS — E11.621 TYPE 2 DIABETES MELLITUS WITH FOOT ULCER, WITHOUT LONG-TERM CURRENT USE OF INSULIN (H): Primary | ICD-10-CM

## 2024-09-19 DIAGNOSIS — L97.509 TYPE 2 DIABETES MELLITUS WITH FOOT ULCER, WITHOUT LONG-TERM CURRENT USE OF INSULIN (H): Primary | ICD-10-CM

## 2024-09-20 RX ORDER — ACYCLOVIR 400 MG/1
TABLET ORAL
Qty: 1 EACH | Refills: 0 | Status: SHIPPED | OUTPATIENT
Start: 2024-09-20

## 2024-09-20 RX ORDER — ACYCLOVIR 400 MG/1
TABLET ORAL
Qty: 3 EACH | Refills: 5 | Status: SHIPPED | OUTPATIENT
Start: 2024-09-20

## 2024-09-27 ENCOUNTER — MYC MEDICAL ADVICE (OUTPATIENT)
Dept: FAMILY MEDICINE | Facility: CLINIC | Age: 79
End: 2024-09-27
Payer: COMMERCIAL

## 2024-09-27 NOTE — TELEPHONE ENCOUNTER
"See response from the PCP to the patient's MyChart message on 9/27/24:    \"Take the torsamide every other day and see how thatn goes \"    Writer called the patient and relayed the above message to the patient, who verbalized understanding and agrees with the plan.    Patient currently resides at the [facility] in [place] and patient is requesting that the writer inform the nursing staff at the [facility] of the above provider's message.    Writer called the director of nursing at [facility] and spoke to NISA Aburto, and relayed the PCP's recommendation to the [place], who stated that the [facility] facility is requiring a written order for the torsemide medication change to be sent to the facility at fax number is [phone].    Lamon[facility] will place the daily dose of torsemide on hold for the patient.    Writer will route the above request to the PCP and primary care pool for review and to advise next steps.    Apple Ribeiro RN, BSN  [facility]    "

## 2024-09-27 NOTE — TELEPHONE ENCOUNTER
Instead of taking torsemide 10 mg every other day I would prefer patient decrease torsemide instead down to 5 mg once daily.  Please notify care center.

## 2024-09-27 NOTE — TELEPHONE ENCOUNTER
Madelyn with Dr. Hatch. Order was written and printed and faxed to [facility] at [phone] .    Brenda Leggett RN  [facility]

## 2024-10-03 ENCOUNTER — TRANSFERRED RECORDS (OUTPATIENT)
Dept: HEALTH INFORMATION MANAGEMENT | Facility: CLINIC | Age: 79
End: 2024-10-03
Payer: COMMERCIAL

## 2024-10-07 ENCOUNTER — TELEPHONE (OUTPATIENT)
Dept: FAMILY MEDICINE | Facility: CLINIC | Age: 79
End: 2024-10-07
Payer: COMMERCIAL

## 2024-10-07 NOTE — TELEPHONE ENCOUNTER
Home Care is calling regarding an established patient with  [facility].        8/24/2023     4:07 PM   Home Care Information   Date of Home Care episode start 8/24/2023   Current following provider Needs to confirm    Name/Phone Number NISA Valladares Case Manager, [phone]   Home Care agency [facility]     Requesting orders from: Rickey Adamson  Provider is following patient: Yes  Is this a 60-day recertification request?  No    Orders Requested    Skilled Nursing  Request for resumption in care.     1x week for 1 week, 2x week for 3 weeks for ongoing wound care.     Isa sees the patient today at 10 AM, will route high priority.     Information was gathered and will be sent to provider for review.  RN will contact Home Care with information after provider review.  Confirmed ok to leave a detailed message with call back.  Contact information confirmed and updated as needed.    Ye Rob RN

## 2024-10-09 ENCOUNTER — MYC MEDICAL ADVICE (OUTPATIENT)
Dept: FAMILY MEDICINE | Facility: CLINIC | Age: 79
End: 2024-10-09
Payer: COMMERCIAL

## 2024-10-09 DIAGNOSIS — L97.509 TYPE 2 DIABETES MELLITUS WITH FOOT ULCER, WITHOUT LONG-TERM CURRENT USE OF INSULIN (H): ICD-10-CM

## 2024-10-09 DIAGNOSIS — E11.621 TYPE 2 DIABETES MELLITUS WITH FOOT ULCER, WITHOUT LONG-TERM CURRENT USE OF INSULIN (H): ICD-10-CM

## 2024-10-10 RX ORDER — ACYCLOVIR 400 MG/1
TABLET ORAL
Qty: 1 EACH | Refills: 0 | Status: SHIPPED | OUTPATIENT
Start: 2024-10-10

## 2024-10-11 ENCOUNTER — TELEPHONE (OUTPATIENT)
Dept: FAMILY MEDICINE | Facility: CLINIC | Age: 79
End: 2024-10-11
Payer: COMMERCIAL

## 2024-10-11 NOTE — TELEPHONE ENCOUNTER
NISA Gutierrez, [facility], called into the clinic, on 10/11/24, to report an increased blood glucose reading with the patient on 10/15/24.    On Tuesday, 10/15/24, Brenda visited the patient for a home care visit and reports that the patient had an increased BG, noted at 344 mg/dl on continuous glucose monitor, after patient has just ate lunch and ate a cookie.    Denies that patient relayed any hyperglycemia symptoms at the time of home health visit.    Writer will route the above information to the PCP to review.    Apple Ribeiro RN, BSN  [facility]

## 2024-10-14 ENCOUNTER — TELEPHONE (OUTPATIENT)
Dept: FAMILY MEDICINE | Facility: CLINIC | Age: 79
End: 2024-10-14
Payer: COMMERCIAL

## 2024-10-14 NOTE — TELEPHONE ENCOUNTER
FYI - Status Update    Who is Calling: nurseIsa     Update: Isa is calling to let the provider know that the patient right elbow has healed and is being discharged. Isa also wanted provider to know when patient gets his knee replacement can he sign for PT and OT. Please advise and call Isa back with updates please anfd thank you.     Does caller want a call/response back: Yes     Could we send this information to you in [employer] or would you prefer to receive a phone call?:   Patient would prefer a phone call   Okay to leave a detailed message?: Yes at Other phone number:  [phone]

## 2024-10-14 NOTE — TELEPHONE ENCOUNTER
"See message from the PCP to the patient's home care nurse on 10/14/24:    \"I will address that when the time comes\"    Writer called NISA Moss, at [facility] and left a detailed message with the above PCP's message on Isa's voicemail box.    If Isa calls back, please relay the above provider's message to Isa.    Please route to the RN queue for any questions or concerns.    Apple Ribeiro RN, BSN  [facility]    "

## 2024-10-15 NOTE — TELEPHONE ENCOUNTER
Signed Prescriptions:                        Disp   Refills    Continuous Glucose  (DEXCOM G7 REC*1 each 0        Sig: Use to read blood sugars as per 's           instructions.  Authorizing Provider: YEMI HAWTHORNE  Ordering User: JORDON PARIS

## 2024-10-16 DIAGNOSIS — L97.509 TYPE 2 DIABETES MELLITUS WITH FOOT ULCER, WITHOUT LONG-TERM CURRENT USE OF INSULIN (H): ICD-10-CM

## 2024-10-16 DIAGNOSIS — E11.621 TYPE 2 DIABETES MELLITUS WITH FOOT ULCER, WITHOUT LONG-TERM CURRENT USE OF INSULIN (H): ICD-10-CM

## 2024-10-16 RX ORDER — ACYCLOVIR 400 MG/1
TABLET ORAL
Qty: 3 EACH | Refills: 5 | Status: SHIPPED | OUTPATIENT
Start: 2024-10-16

## 2024-10-16 NOTE — TELEPHONE ENCOUNTER
Order pended for sensor instead of , for provider review.     Luisito Romano RN  [facility]

## 2024-10-21 ENCOUNTER — TELEPHONE (OUTPATIENT)
Dept: FAMILY MEDICINE | Facility: CLINIC | Age: 79
End: 2024-10-21
Payer: COMMERCIAL

## 2024-10-21 NOTE — TELEPHONE ENCOUNTER
Pharmacy calling to follow-up on fax for order for diabetic supplies. Advised no fax is noted and appears patient is using [employer] delivery instead.     Sharla Gómez RN  [facility]

## 2024-10-23 ENCOUNTER — MEDICAL CORRESPONDENCE (OUTPATIENT)
Dept: HEALTH INFORMATION MANAGEMENT | Facility: CLINIC | Age: 79
End: 2024-10-23

## 2024-10-24 ENCOUNTER — PATIENT OUTREACH (OUTPATIENT)
Dept: CARE COORDINATION | Facility: CLINIC | Age: 79
End: 2024-10-24
Payer: COMMERCIAL

## 2024-11-01 ENCOUNTER — MEDICAL CORRESPONDENCE (OUTPATIENT)
Dept: HEALTH INFORMATION MANAGEMENT | Facility: CLINIC | Age: 79
End: 2024-11-01

## 2024-11-01 DIAGNOSIS — I63.433 CEREBROVASCULAR ACCIDENT (CVA) DUE TO BILATERAL EMBOLISM OF POSTERIOR CEREBRAL ARTERIES (H): Primary | ICD-10-CM

## 2024-11-01 RX ORDER — ASPIRIN 81 MG/1
81 TABLET ORAL DAILY
Qty: 100 TABLET | Refills: 3 | Status: ON HOLD | OUTPATIENT
Start: 2024-11-01

## 2024-11-01 NOTE — TELEPHONE ENCOUNTER
Medication Question or Refill    What medication are you calling about (include dose and sig)?: aspirin 81 MG EC tablet     Preferred Pharmacy:    [id number] - [facility] [place], [place] - [address]  [address]  [address]  [facility] [address]  Phone: [phone] Fax: [phone]      Controlled Substance Agreement on file:   CSA -- Patient Level:    CSA: None found at the patient level.       Who prescribed the medication?: Patient Reported    Do you need a refill? Yes    When did you use the medication last? N/A    Patient offered an appointment? No    Do you have any questions or concerns?  No      Could we send this information to you in [facility] or would you prefer to receive a phone call?:   Patient would prefer a phone call   Okay to leave a detailed message?: Yes at Home number on file [phone] (home)

## 2024-11-04 ENCOUNTER — MYC MEDICAL ADVICE (OUTPATIENT)
Dept: FAMILY MEDICINE | Facility: CLINIC | Age: 79
End: 2024-11-04
Payer: COMMERCIAL

## 2024-11-05 NOTE — TELEPHONE ENCOUNTER
"See MyChart message from the patient, into the clinic, on 11/4/24.    See response from the PCP to the patient regarding MyChart message request on 11/5/24.    \"Okay to discontinue Rybelsus and send old thank you\"    Writer sent the patient a MyChart message, relaying that the Rybelsus was discontinued in the patient's medication list.    If the patient calls into the clinic, please relay the above message to the patient.    Please route to the RN queue for any questions or concerns.    Apple Ribeiro, RN, BSN  [facility]    "

## 2024-11-06 ENCOUNTER — MYC MEDICAL ADVICE (OUTPATIENT)
Dept: FAMILY MEDICINE | Facility: CLINIC | Age: 79
End: 2024-11-06
Payer: COMMERCIAL

## 2024-11-06 NOTE — TELEPHONE ENCOUNTER
"Called and spoke to patient and relayed the provider response.  Per Dr Adamson,   \"An over-the-counter saline mist inhaler would be fine\"    Education provided that if patient experiences any significant shortness of breath, or any new or worsening symptoms he should call back to clinic or seek immediate evaluation.    Patient endorses understanding and denies further questions or concerns.    Brenda Leggett RN  [facility]  "

## 2024-11-08 RX ORDER — EPINEPHRINE INHALATION 125 UG/1
AEROSOL RESPIRATORY (INHALATION)
COMMUNITY
End: 2024-11-14

## 2024-11-14 ENCOUNTER — APPOINTMENT (OUTPATIENT)
Dept: ULTRASOUND IMAGING | Facility: HOSPITAL | Age: 79
End: 2024-11-14
Payer: COMMERCIAL

## 2024-11-14 ENCOUNTER — APPOINTMENT (OUTPATIENT)
Dept: RADIOLOGY | Facility: CLINIC | Age: 79
End: 2024-11-14
Attending: EMERGENCY MEDICINE
Payer: COMMERCIAL

## 2024-11-14 ENCOUNTER — HOSPITAL ENCOUNTER (INPATIENT)
Facility: HOSPITAL | Age: 79
End: 2024-11-14
Attending: INTERNAL MEDICINE | Admitting: THORACIC SURGERY (CARDIOTHORACIC VASCULAR SURGERY)
Payer: COMMERCIAL

## 2024-11-14 ENCOUNTER — HOSPITAL ENCOUNTER (EMERGENCY)
Facility: CLINIC | Age: 79
Discharge: OTHER INSTITUTION | End: 2024-11-14
Attending: EMERGENCY MEDICINE | Admitting: EMERGENCY MEDICINE
Payer: COMMERCIAL

## 2024-11-14 ENCOUNTER — MEDICAL CORRESPONDENCE (OUTPATIENT)
Dept: HEALTH INFORMATION MANAGEMENT | Facility: CLINIC | Age: 79
End: 2024-11-14

## 2024-11-14 ENCOUNTER — APPOINTMENT (OUTPATIENT)
Dept: CARDIOLOGY | Facility: CLINIC | Age: 79
End: 2024-11-14
Attending: EMERGENCY MEDICINE
Payer: COMMERCIAL

## 2024-11-14 VITALS
SYSTOLIC BLOOD PRESSURE: 137 MMHG | RESPIRATION RATE: 18 BRPM | HEIGHT: 72 IN | OXYGEN SATURATION: 96 % | TEMPERATURE: 97.7 F | WEIGHT: 192 LBS | HEART RATE: 92 BPM | DIASTOLIC BLOOD PRESSURE: 81 MMHG | BODY MASS INDEX: 26.01 KG/M2

## 2024-11-14 DIAGNOSIS — Z95.1 S/P CABG X 3: Primary | ICD-10-CM

## 2024-11-14 DIAGNOSIS — I21.3 ST ELEVATION MI (STEMI) (H): ICD-10-CM

## 2024-11-14 DIAGNOSIS — I21.3 ST ELEVATION MYOCARDIAL INFARCTION (STEMI), UNSPECIFIED ARTERY (H): ICD-10-CM

## 2024-11-14 DIAGNOSIS — I21.4 NSTEMI (NON-ST ELEVATED MYOCARDIAL INFARCTION) (H): ICD-10-CM

## 2024-11-14 PROBLEM — Z98.890 STATUS POST CORONARY ANGIOGRAM: Status: ACTIVE | Noted: 2024-11-14

## 2024-11-14 LAB
ACT BLD: 236 SECONDS (ref 74–150)
ALBUMIN SERPL BCG-MCNC: 3 G/DL (ref 3.5–5.2)
ALP SERPL-CCNC: 393 U/L (ref 40–150)
ALT SERPL W P-5'-P-CCNC: 33 U/L (ref 0–70)
ANION GAP SERPL CALCULATED.3IONS-SCNC: 14 MMOL/L (ref 7–15)
APTT PPP: >240 SECONDS (ref 22–38)
AST SERPL W P-5'-P-CCNC: 38 U/L (ref 0–45)
ATRIAL RATE - MUSE: 96 BPM
BASOPHILS # BLD AUTO: 0.1 10E3/UL (ref 0–0.2)
BASOPHILS NFR BLD AUTO: 1 %
BILIRUB DIRECT SERPL-MCNC: 1.22 MG/DL (ref 0–0.3)
BILIRUB SERPL-MCNC: 1.6 MG/DL
BUN SERPL-MCNC: 23 MG/DL (ref 8–23)
CALCIUM SERPL-MCNC: 8.6 MG/DL (ref 8.8–10.4)
CHLORIDE SERPL-SCNC: 98 MMOL/L (ref 98–107)
CHOLEST SERPL-MCNC: 127 MG/DL
CREAT SERPL-MCNC: 1.45 MG/DL (ref 0.67–1.17)
DIASTOLIC BLOOD PRESSURE - MUSE: NORMAL MMHG
EGFRCR SERPLBLD CKD-EPI 2021: 49 ML/MIN/1.73M2
EOSINOPHIL # BLD AUTO: 0.2 10E3/UL (ref 0–0.7)
EOSINOPHIL NFR BLD AUTO: 1 %
ERYTHROCYTE [DISTWIDTH] IN BLOOD BY AUTOMATED COUNT: 17.2 % (ref 10–15)
ERYTHROCYTE [DISTWIDTH] IN BLOOD BY AUTOMATED COUNT: 17.2 % (ref 10–15)
EST. AVERAGE GLUCOSE BLD GHB EST-MCNC: 214 MG/DL
GLUCOSE BLDC GLUCOMTR-MCNC: 175 MG/DL (ref 70–99)
GLUCOSE BLDC GLUCOMTR-MCNC: 199 MG/DL (ref 70–99)
GLUCOSE SERPL-MCNC: 288 MG/DL (ref 70–99)
HBA1C MFR BLD: 9.1 %
HCO3 SERPL-SCNC: 25 MMOL/L (ref 22–29)
HCT VFR BLD AUTO: 33.6 % (ref 40–53)
HCT VFR BLD AUTO: 33.9 % (ref 40–53)
HDLC SERPL-MCNC: 23 MG/DL
HGB BLD-MCNC: 10.1 G/DL (ref 13.3–17.7)
HGB BLD-MCNC: 10.3 G/DL (ref 13.3–17.7)
IMM GRANULOCYTES # BLD: 0.1 10E3/UL
IMM GRANULOCYTES NFR BLD: 1 %
INR PPP: 2.04 (ref 0.85–1.15)
INTERPRETATION ECG - MUSE: NORMAL
LDLC SERPL CALC-MCNC: 88 MG/DL
LIPASE SERPL-CCNC: 40 U/L (ref 13–60)
LVEF ECHO: NORMAL
LYMPHOCYTES # BLD AUTO: 1.3 10E3/UL (ref 0.8–5.3)
LYMPHOCYTES NFR BLD AUTO: 13 %
MAGNESIUM SERPL-MCNC: 2 MG/DL (ref 1.7–2.3)
MCH RBC QN AUTO: 27.7 PG (ref 26.5–33)
MCH RBC QN AUTO: 28.5 PG (ref 26.5–33)
MCHC RBC AUTO-ENTMCNC: 29.8 G/DL (ref 31.5–36.5)
MCHC RBC AUTO-ENTMCNC: 30.7 G/DL (ref 31.5–36.5)
MCV RBC AUTO: 93 FL (ref 78–100)
MCV RBC AUTO: 93 FL (ref 78–100)
MONOCYTES # BLD AUTO: 0.7 10E3/UL (ref 0–1.3)
MONOCYTES NFR BLD AUTO: 6 %
NEUTROPHILS # BLD AUTO: 8.4 10E3/UL (ref 1.6–8.3)
NEUTROPHILS NFR BLD AUTO: 79 %
NONHDLC SERPL-MCNC: 104 MG/DL
NRBC # BLD AUTO: 0 10E3/UL
NRBC BLD AUTO-RTO: 0 /100
NT-PROBNP SERPL-MCNC: ABNORMAL PG/ML (ref 0–1800)
P AXIS - MUSE: 70 DEGREES
PLATELET # BLD AUTO: 381 10E3/UL (ref 150–450)
PLATELET # BLD AUTO: 387 10E3/UL (ref 150–450)
POTASSIUM SERPL-SCNC: 3.5 MMOL/L (ref 3.4–5.3)
PR INTERVAL - MUSE: 190 MS
PROT SERPL-MCNC: 7.1 G/DL (ref 6.4–8.3)
QRS DURATION - MUSE: 118 MS
QT - MUSE: 396 MS
QTC - MUSE: 500 MS
R AXIS - MUSE: -31 DEGREES
RBC # BLD AUTO: 3.62 10E6/UL (ref 4.4–5.9)
RBC # BLD AUTO: 3.65 10E6/UL (ref 4.4–5.9)
SODIUM SERPL-SCNC: 137 MMOL/L (ref 135–145)
SYSTOLIC BLOOD PRESSURE - MUSE: NORMAL MMHG
T AXIS - MUSE: 116 DEGREES
T4 FREE SERPL-MCNC: 1.43 NG/DL (ref 0.9–1.7)
TRIGL SERPL-MCNC: 80 MG/DL
TROPONIN T SERPL HS-MCNC: 45 NG/L
TSH SERPL DL<=0.005 MIU/L-ACNC: 4.28 UIU/ML (ref 0.3–4.2)
VENTRICULAR RATE- MUSE: 96 BPM
WBC # BLD AUTO: 10 10E3/UL (ref 4–11)
WBC # BLD AUTO: 10.6 10E3/UL (ref 4–11)

## 2024-11-14 PROCEDURE — 71045 X-RAY EXAM CHEST 1 VIEW: CPT

## 2024-11-14 PROCEDURE — 4A023N7 MEASUREMENT OF CARDIAC SAMPLING AND PRESSURE, LEFT HEART, PERCUTANEOUS APPROACH: ICD-10-PCS | Performed by: INTERNAL MEDICINE

## 2024-11-14 PROCEDURE — 99291 CRITICAL CARE FIRST HOUR: CPT

## 2024-11-14 PROCEDURE — 272N000001 HC OR GENERAL SUPPLY STERILE: Performed by: INTERNAL MEDICINE

## 2024-11-14 PROCEDURE — 82248 BILIRUBIN DIRECT: CPT | Performed by: EMERGENCY MEDICINE

## 2024-11-14 PROCEDURE — 84443 ASSAY THYROID STIM HORMONE: CPT | Performed by: EMERGENCY MEDICINE

## 2024-11-14 PROCEDURE — 36415 COLL VENOUS BLD VENIPUNCTURE: CPT | Performed by: EMERGENCY MEDICINE

## 2024-11-14 PROCEDURE — 250N000011 HC RX IP 250 OP 636: Performed by: INTERNAL MEDICINE

## 2024-11-14 PROCEDURE — 85048 AUTOMATED LEUKOCYTE COUNT: CPT | Performed by: GENERAL ACUTE CARE HOSPITAL

## 2024-11-14 PROCEDURE — 255N000002 HC RX 255 OP 636: Performed by: INTERNAL MEDICINE

## 2024-11-14 PROCEDURE — 84484 ASSAY OF TROPONIN QUANT: CPT | Performed by: EMERGENCY MEDICINE

## 2024-11-14 PROCEDURE — 93458 L HRT ARTERY/VENTRICLE ANGIO: CPT | Performed by: INTERNAL MEDICINE

## 2024-11-14 PROCEDURE — 96375 TX/PRO/DX INJ NEW DRUG ADDON: CPT

## 2024-11-14 PROCEDURE — 250N000013 HC RX MED GY IP 250 OP 250 PS 637: Performed by: STUDENT IN AN ORGANIZED HEALTH CARE EDUCATION/TRAINING PROGRAM

## 2024-11-14 PROCEDURE — 85347 COAGULATION TIME ACTIVATED: CPT

## 2024-11-14 PROCEDURE — C1753 CATH, INTRAVAS ULTRASOUND: HCPCS | Performed by: INTERNAL MEDICINE

## 2024-11-14 PROCEDURE — 83880 ASSAY OF NATRIURETIC PEPTIDE: CPT | Performed by: EMERGENCY MEDICINE

## 2024-11-14 PROCEDURE — 255N000002 HC RX 255 OP 636: Performed by: EMERGENCY MEDICINE

## 2024-11-14 PROCEDURE — 250N000011 HC RX IP 250 OP 636: Performed by: EMERGENCY MEDICINE

## 2024-11-14 PROCEDURE — 36415 COLL VENOUS BLD VENIPUNCTURE: CPT | Performed by: STUDENT IN AN ORGANIZED HEALTH CARE EDUCATION/TRAINING PROGRAM

## 2024-11-14 PROCEDURE — 80053 COMPREHEN METABOLIC PANEL: CPT | Performed by: EMERGENCY MEDICINE

## 2024-11-14 PROCEDURE — 82465 ASSAY BLD/SERUM CHOLESTEROL: CPT | Performed by: STUDENT IN AN ORGANIZED HEALTH CARE EDUCATION/TRAINING PROGRAM

## 2024-11-14 PROCEDURE — 250N000013 HC RX MED GY IP 250 OP 250 PS 637: Performed by: PHYSICIAN ASSISTANT

## 2024-11-14 PROCEDURE — C8929 TTE W OR WO FOL WCON,DOPPLER: HCPCS

## 2024-11-14 PROCEDURE — 250N000012 HC RX MED GY IP 250 OP 636 PS 637: Performed by: STUDENT IN AN ORGANIZED HEALTH CARE EDUCATION/TRAINING PROGRAM

## 2024-11-14 PROCEDURE — 84439 ASSAY OF FREE THYROXINE: CPT | Performed by: EMERGENCY MEDICINE

## 2024-11-14 PROCEDURE — C1769 GUIDE WIRE: HCPCS | Performed by: INTERNAL MEDICINE

## 2024-11-14 PROCEDURE — 250N000013 HC RX MED GY IP 250 OP 250 PS 637: Performed by: EMERGENCY MEDICINE

## 2024-11-14 PROCEDURE — 99153 MOD SED SAME PHYS/QHP EA: CPT

## 2024-11-14 PROCEDURE — 96374 THER/PROPH/DIAG INJ IV PUSH: CPT

## 2024-11-14 PROCEDURE — B211YZZ FLUOROSCOPY OF MULTIPLE CORONARY ARTERIES USING OTHER CONTRAST: ICD-10-PCS | Performed by: INTERNAL MEDICINE

## 2024-11-14 PROCEDURE — 85014 HEMATOCRIT: CPT | Performed by: GENERAL ACUTE CARE HOSPITAL

## 2024-11-14 PROCEDURE — 99223 1ST HOSP IP/OBS HIGH 75: CPT | Performed by: STUDENT IN AN ORGANIZED HEALTH CARE EDUCATION/TRAINING PROGRAM

## 2024-11-14 PROCEDURE — C1894 INTRO/SHEATH, NON-LASER: HCPCS | Performed by: INTERNAL MEDICINE

## 2024-11-14 PROCEDURE — 99223 1ST HOSP IP/OBS HIGH 75: CPT | Performed by: INTERNAL MEDICINE

## 2024-11-14 PROCEDURE — 83690 ASSAY OF LIPASE: CPT | Performed by: EMERGENCY MEDICINE

## 2024-11-14 PROCEDURE — 85730 THROMBOPLASTIN TIME PARTIAL: CPT | Performed by: EMERGENCY MEDICINE

## 2024-11-14 PROCEDURE — 250N000011 HC RX IP 250 OP 636: Performed by: GENERAL ACUTE CARE HOSPITAL

## 2024-11-14 PROCEDURE — 93005 ELECTROCARDIOGRAM TRACING: CPT | Performed by: EMERGENCY MEDICINE

## 2024-11-14 PROCEDURE — 85025 COMPLETE CBC W/AUTO DIFF WBC: CPT | Performed by: EMERGENCY MEDICINE

## 2024-11-14 PROCEDURE — 92978 ENDOLUMINL IVUS OCT C 1ST: CPT | Mod: 26 | Performed by: INTERNAL MEDICINE

## 2024-11-14 PROCEDURE — 93880 EXTRACRANIAL BILAT STUDY: CPT

## 2024-11-14 PROCEDURE — 83735 ASSAY OF MAGNESIUM: CPT | Performed by: EMERGENCY MEDICINE

## 2024-11-14 PROCEDURE — 120N000013 HC R&B IMCU

## 2024-11-14 PROCEDURE — 93458 L HRT ARTERY/VENTRICLE ANGIO: CPT | Mod: 26 | Performed by: INTERNAL MEDICINE

## 2024-11-14 PROCEDURE — C1887 CATHETER, GUIDING: HCPCS | Performed by: INTERNAL MEDICINE

## 2024-11-14 PROCEDURE — 99152 MOD SED SAME PHYS/QHP 5/>YRS: CPT

## 2024-11-14 PROCEDURE — 83036 HEMOGLOBIN GLYCOSYLATED A1C: CPT | Performed by: STUDENT IN AN ORGANIZED HEALTH CARE EDUCATION/TRAINING PROGRAM

## 2024-11-14 PROCEDURE — 92978 ENDOLUMINL IVUS OCT C 1ST: CPT | Performed by: INTERNAL MEDICINE

## 2024-11-14 PROCEDURE — 250N000009 HC RX 250: Performed by: INTERNAL MEDICINE

## 2024-11-14 PROCEDURE — 85610 PROTHROMBIN TIME: CPT | Performed by: EMERGENCY MEDICINE

## 2024-11-14 PROCEDURE — 93306 TTE W/DOPPLER COMPLETE: CPT | Mod: 26 | Performed by: INTERNAL MEDICINE

## 2024-11-14 RX ORDER — ASPIRIN 325 MG
325 TABLET ORAL ONCE
Status: COMPLETED | OUTPATIENT
Start: 2024-11-14 | End: 2024-11-14

## 2024-11-14 RX ORDER — HEPARIN SODIUM 200 [USP'U]/100ML
INJECTION, SOLUTION INTRAVENOUS
Status: DISCONTINUED | OUTPATIENT
Start: 2024-11-14 | End: 2024-11-14 | Stop reason: HOSPADM

## 2024-11-14 RX ORDER — ATORVASTATIN CALCIUM 40 MG/1
40 TABLET, FILM COATED ORAL EVERY EVENING
Status: DISCONTINUED | OUTPATIENT
Start: 2024-11-14 | End: 2024-11-26 | Stop reason: HOSPADM

## 2024-11-14 RX ORDER — FINASTERIDE 5 MG/1
5 TABLET, FILM COATED ORAL DAILY
Status: DISCONTINUED | OUTPATIENT
Start: 2024-11-15 | End: 2024-11-18

## 2024-11-14 RX ORDER — NALOXONE HYDROCHLORIDE 0.4 MG/ML
0.4 INJECTION, SOLUTION INTRAMUSCULAR; INTRAVENOUS; SUBCUTANEOUS
Status: ACTIVE | OUTPATIENT
Start: 2024-11-14 | End: 2024-11-14

## 2024-11-14 RX ORDER — ALLOPURINOL 100 MG/1
100 TABLET ORAL DAILY
Status: DISCONTINUED | OUTPATIENT
Start: 2024-11-15 | End: 2024-11-18

## 2024-11-14 RX ORDER — HEPARIN SODIUM 10000 [USP'U]/100ML
0-5000 INJECTION, SOLUTION INTRAVENOUS CONTINUOUS
Status: DISCONTINUED | OUTPATIENT
Start: 2024-11-14 | End: 2024-11-14 | Stop reason: HOSPADM

## 2024-11-14 RX ORDER — HEPARIN SODIUM 10000 [USP'U]/100ML
0-5000 INJECTION, SOLUTION INTRAVENOUS CONTINUOUS
Status: DISCONTINUED | OUTPATIENT
Start: 2024-11-14 | End: 2024-11-16

## 2024-11-14 RX ORDER — OXYCODONE HYDROCHLORIDE 5 MG/1
10 TABLET ORAL EVERY 4 HOURS PRN
Status: DISCONTINUED | OUTPATIENT
Start: 2024-11-14 | End: 2024-11-18

## 2024-11-14 RX ORDER — TORSEMIDE 10 MG/1
0.5 TABLET ORAL DAILY
Status: ON HOLD | COMMUNITY

## 2024-11-14 RX ORDER — FERROUS GLUCONATE 324(38)MG
324 TABLET ORAL DAILY
Status: DISCONTINUED | OUTPATIENT
Start: 2024-11-15 | End: 2024-11-18

## 2024-11-14 RX ORDER — CAYENNE 450 MG
1 CAPSULE ORAL DAILY
Status: ON HOLD | COMMUNITY

## 2024-11-14 RX ORDER — ACETAMINOPHEN 325 MG/1
650 TABLET ORAL EVERY 4 HOURS PRN
Status: DISCONTINUED | OUTPATIENT
Start: 2024-11-14 | End: 2024-11-16

## 2024-11-14 RX ORDER — ASPIRIN 81 MG/1
81 TABLET ORAL DAILY
Status: DISCONTINUED | OUTPATIENT
Start: 2024-11-15 | End: 2024-11-18

## 2024-11-14 RX ORDER — TORSEMIDE 5 MG/1
5 TABLET ORAL DAILY
Status: DISCONTINUED | OUTPATIENT
Start: 2024-11-15 | End: 2024-11-18

## 2024-11-14 RX ORDER — FENTANYL CITRATE 50 UG/ML
INJECTION, SOLUTION INTRAMUSCULAR; INTRAVENOUS
Status: DISCONTINUED | OUTPATIENT
Start: 2024-11-14 | End: 2024-11-14 | Stop reason: HOSPADM

## 2024-11-14 RX ORDER — SODIUM CHLORIDE 9 MG/ML
75 INJECTION, SOLUTION INTRAVENOUS CONTINUOUS
Status: ACTIVE | OUTPATIENT
Start: 2024-11-14 | End: 2024-11-14

## 2024-11-14 RX ORDER — IBUPROFEN 400 MG/1
400 TABLET, FILM COATED ORAL EVERY 6 HOURS PRN
Status: ON HOLD | COMMUNITY

## 2024-11-14 RX ORDER — NALOXONE HYDROCHLORIDE 0.4 MG/ML
0.2 INJECTION, SOLUTION INTRAMUSCULAR; INTRAVENOUS; SUBCUTANEOUS
Status: ACTIVE | OUTPATIENT
Start: 2024-11-14 | End: 2024-11-14

## 2024-11-14 RX ORDER — HEPARIN SODIUM 1000 [USP'U]/ML
INJECTION, SOLUTION INTRAVENOUS; SUBCUTANEOUS
Status: DISCONTINUED | OUTPATIENT
Start: 2024-11-14 | End: 2024-11-14 | Stop reason: HOSPADM

## 2024-11-14 RX ORDER — NICOTINE POLACRILEX 4 MG
15-30 LOZENGE BUCCAL
Status: DISCONTINUED | OUTPATIENT
Start: 2024-11-14 | End: 2024-11-18

## 2024-11-14 RX ORDER — ATROPINE SULFATE 0.1 MG/ML
0.5 INJECTION INTRAVENOUS
Status: ACTIVE | OUTPATIENT
Start: 2024-11-14 | End: 2024-11-14

## 2024-11-14 RX ORDER — HYDRALAZINE HYDROCHLORIDE 20 MG/ML
10 INJECTION INTRAMUSCULAR; INTRAVENOUS
Status: DISCONTINUED | OUTPATIENT
Start: 2024-11-14 | End: 2024-11-15

## 2024-11-14 RX ORDER — SODIUM BICARBONATE 650 MG/1
1950 TABLET ORAL 3 TIMES DAILY
Status: DISCONTINUED | OUTPATIENT
Start: 2024-11-14 | End: 2024-11-18

## 2024-11-14 RX ORDER — NITROGLYCERIN 0.4 MG/1
0.4 TABLET SUBLINGUAL EVERY 5 MIN PRN
Status: DISCONTINUED | OUTPATIENT
Start: 2024-11-14 | End: 2024-11-14 | Stop reason: HOSPADM

## 2024-11-14 RX ORDER — DEXTROSE MONOHYDRATE 25 G/50ML
25-50 INJECTION, SOLUTION INTRAVENOUS
Status: DISCONTINUED | OUTPATIENT
Start: 2024-11-14 | End: 2024-11-18

## 2024-11-14 RX ORDER — URSODIOL 300 MG/1
300 CAPSULE ORAL 2 TIMES DAILY
Status: DISCONTINUED | OUTPATIENT
Start: 2024-11-14 | End: 2024-11-18

## 2024-11-14 RX ORDER — LISINOPRIL 5 MG/1
10 TABLET ORAL EVERY 24 HOURS
Status: DISCONTINUED | OUTPATIENT
Start: 2024-11-15 | End: 2024-11-18

## 2024-11-14 RX ORDER — GABAPENTIN 300 MG/1
300 CAPSULE ORAL 3 TIMES DAILY
Status: DISCONTINUED | OUTPATIENT
Start: 2024-11-15 | End: 2024-11-18

## 2024-11-14 RX ORDER — IODIXANOL 320 MG/ML
INJECTION, SOLUTION INTRAVASCULAR
Status: DISCONTINUED | OUTPATIENT
Start: 2024-11-14 | End: 2024-11-14 | Stop reason: HOSPADM

## 2024-11-14 RX ORDER — FENTANYL CITRATE 50 UG/ML
25 INJECTION, SOLUTION INTRAMUSCULAR; INTRAVENOUS
Status: DISCONTINUED | OUTPATIENT
Start: 2024-11-14 | End: 2024-11-15

## 2024-11-14 RX ORDER — COLCHICINE 0.6 MG/1
0.6 TABLET ORAL 2 TIMES DAILY
Status: DISCONTINUED | OUTPATIENT
Start: 2024-11-15 | End: 2024-11-18

## 2024-11-14 RX ORDER — NORTRIPTYLINE HYDROCHLORIDE 10 MG/1
2 CAPSULE ORAL DAILY PRN
Status: ON HOLD | COMMUNITY

## 2024-11-14 RX ORDER — OXYCODONE HYDROCHLORIDE 5 MG/1
5 TABLET ORAL EVERY 4 HOURS PRN
Status: DISCONTINUED | OUTPATIENT
Start: 2024-11-14 | End: 2024-11-18

## 2024-11-14 RX ORDER — FLUMAZENIL 0.1 MG/ML
0.2 INJECTION, SOLUTION INTRAVENOUS
Status: ACTIVE | OUTPATIENT
Start: 2024-11-14 | End: 2024-11-14

## 2024-11-14 RX ORDER — FUROSEMIDE 10 MG/ML
60 INJECTION INTRAMUSCULAR; INTRAVENOUS ONCE
Status: COMPLETED | OUTPATIENT
Start: 2024-11-14 | End: 2024-11-14

## 2024-11-14 RX ADMIN — INSULIN GLARGINE 10 UNITS: 100 INJECTION, SOLUTION SUBCUTANEOUS at 21:04

## 2024-11-14 RX ADMIN — NITROGLYCERIN 0.4 MG: 0.4 TABLET SUBLINGUAL at 11:14

## 2024-11-14 RX ADMIN — ATORVASTATIN CALCIUM 40 MG: 40 TABLET, FILM COATED ORAL at 21:06

## 2024-11-14 RX ADMIN — NITROGLYCERIN 0.4 MG: 0.4 TABLET SUBLINGUAL at 10:49

## 2024-11-14 RX ADMIN — HEPARIN SODIUM 1050 UNITS/HR: 10000 INJECTION, SOLUTION INTRAVENOUS at 11:31

## 2024-11-14 RX ADMIN — FUROSEMIDE 60 MG: 10 INJECTION, SOLUTION INTRAMUSCULAR; INTRAVENOUS at 10:55

## 2024-11-14 RX ADMIN — NITROGLYCERIN 10 MCG/MIN: 20 INJECTION INTRAVENOUS at 11:20

## 2024-11-14 RX ADMIN — URSODIOL 300 MG: 300 CAPSULE ORAL at 21:06

## 2024-11-14 RX ADMIN — ASPIRIN 325 MG ORAL TABLET 325 MG: 325 PILL ORAL at 10:49

## 2024-11-14 RX ADMIN — PERFLUTREN 2 ML: 6.52 INJECTION, SUSPENSION INTRAVENOUS at 11:20

## 2024-11-14 RX ADMIN — HEPARIN SODIUM 1100 UNITS/HR: 10000 INJECTION, SOLUTION INTRAVENOUS at 21:26

## 2024-11-14 RX ADMIN — SODIUM BICARBONATE 650 MG TABLET 1950 MG: at 21:07

## 2024-11-14 ASSESSMENT — ACTIVITIES OF DAILY LIVING (ADL)
ADLS_ACUITY_SCORE: 0

## 2024-11-14 ASSESSMENT — EJECTION FRACTION: EF_VALUE: .15

## 2024-11-14 NOTE — CONSULTS
Cardiothoracic Surgery Consult    Date of Service: 11/14/2024    REFERRING CARDIOLOGIST: Dr. Lew    REASON FOR CONSULTATION: Consideration for surgical revascularization     HISTORY OF PRESENT ILLNESS: Lance Ibrahim is a 79 year old year-old male who has a PMH of CAD w/ known LM stenosis, CKD1, DM2, chronic anemia nonischemic cardiomyopathy (thought to be d/t HTN) w/ chronic HFpEF, h/o CVA (bilateral PCA emboli), and spinal stenosis w/ spinal cord stimulator in place who presnted to [facility] 11/14 for SOB and chest tightness. Workup in the ED at  was concerning for STEMI and patient was transferred to [facility] for coronary angiogram. Coronary angiogram performed demonstrates severe multi-vessel coronary artery disease as below including ostial to mid LM 80% stenosis. CV Surgery is consulted for possible coronary artery bypass surgery.    Patient reports having chest pain and shortness of breath since midnight 11/14. He states that it is specifically upper chest tightness and constriction. He states that his chest pain and SOB had alleviated initially, but he reports that it was constant in the ED at a 3/10. Reports O2 per EMS was helpful. He denies being provided any aspirin or nitroglycerin by EMS, but he notes he takes 81 mg ASA daily. He endorses his chest pain and shortness of breath being provoked with laying flat last night, which he notes led him to spending last night sitting up in a chair. He denies developing any similar chest pain in the past. He endorses having chronic bilateral leg swelling at baseline, but he mentions that this has worsened.    Stat echo in the ED demonstrated a severe decline in LVEF w/ EF 25-30% (previously preserved EF) and some segmental WMA. He was started on heparin bolus/gtt and started on nitro gtt as well. Does not appear to have received Brilinta per chart review.     The patient's coronary artery disease risk factors include +HTN, +HLD, +DMII, remote h/o smoking, and  obesity. Patient is not generally active d/t arthritis in his knees and had a planned knee replacement that was rescheduled d/t the IV fluid shortage in October. Patient denies history of bleeding/clotting issues and issues with anesthesia in the past.     PAST MEDICAL HISTORY:   Past Medical History:   Diagnosis Date    Anemia     Arthritis     osteoarthritis knees    BPH     CAD (coronary artery disease)     subtotal occlusion in the small distal LAD     Cardiomyopathy     Cerebral artery occlusion with cerebral infarction     TIA 1993, no residual    Cervicalgia     CHF (congestive heart failure) (H)     Chronic kidney disease     Chronic low back pain     Chronic rhinitis     Gouty arthropathy     hands    Hyperlipidemia     Hypertension     Kidney stone     Nocturia     Obesity     Osteomyelitis (H) 08/2023    Foot    PVD (peripheral vascular disease)     Sleep apnea     doesn't use cpap    Spinal cord stimulator status 04/2024    Spinal cord stimulator in place    TIA (transient ischaemic attack) 1993    Type 2 diabetes mellitus - 11/08/2018    Ureteral stone        PAST SURGICAL HISTORY:   Past Surgical History:   Procedure Laterality Date    AMPUTATE FOOT Right 08/07/2023    Procedure: AMPUTATION, right hallux;  Surgeon: Nakul Salazar DPM;  Location: [facility] OR    AMPUTATE TOE(S) Left 10/15/2018    Procedure: AMPUTATE TOE(S);  Left third toe amputation;  Surgeon: Ayaka Azevedo DPM, Podiatry/Foot and Ankle Surgery;  Location:  OR    ARTHROPLASTY KNEE Right 12/07/2018    Procedure: Right total knee arthroplasty;  Surgeon: Issa Cunha MD;  Location:  OR    COLONOSCOPY  03/09/2013    Procedure: COLONOSCOPY;  COLONOSCOPY;  Surgeon: Chau Hogan MD;  Location:  GI    CV HEART CATHETERIZATION WITH POSSIBLE INTERVENTION Left 03/05/2019    Procedure: Coronary Angiogram;  Surgeon: Nas Linda MD;  Location:  HEART CARDIAC CATH LAB    Diastasis recti repair  1985     "ENDOSCOPIC RETROGRADE CHOLANGIOPANCREATOGRAM N/A 04/30/2024    Procedure: ENDOSCOPIC RETROGRADE CHOLANGIOPANCREATOGRAPHY, BILIARY SPHINCTEROTOMY, DILATION, BRUSHING, BIOPSIES with DISTAL EXTRA HEPATIC BILE DUCT STRICTURE;  Surgeon: Aldo Gongora MD;  Location: [facility] OR    ESOPHAGOSCOPY, GASTROSCOPY, DUODENOSCOPY (EGD), COMBINED N/A 04/30/2024    Procedure: ESOPHAGOGASTRODUODENOSCOPY, WITH ENDOSCOPIC ULTRASOUND GUIDANCE;  Surgeon: Aldo Gongora MD;  Location: [facility] OR    EXTRACAPSULAR CATARACT EXTRATION WITH INTRAOCULAR LENS IMPLANT Left 03/13/2017    EXTRACAPSULAR CATARACT EXTRATION WITH INTRAOCULAR LENS IMPLANT Right     FOOT SURGERY  04/2013    cyst removal     HERNIA REPAIR  07/13/2004    ventral     IR DISC ASPIRATION INJECTION  6/19/2024    IRRIGATION AND DEBRIDEMENT SHOULDER, COMBINED Right 8/7/2024    Procedure: IRRIGATION AND DEBRIDEMENT, SHOULDER;  Surgeon: Terence Hanson MD;  Location: [facility] OR    IRRIGATION AND DEBRIDEMENT UPPER EXTREMITY, COMBINED Right 8/7/2024    Procedure: IRRIGATION AND DEBRIDEMENT, ELBOW AND THUMB;  Surgeon: Terence Hanson MD;  Location: [facility] OR    ORIF Shoulder Left     PROSTATE SURGERY  02/2024    Urolift    RESECT CLAVICLE DISTAL Right 8/7/2024    Procedure: EXCISION, CLAVICLE, DISTAL;  Surgeon: Terence Hanson MD;  Location: [facility] OR    SPINAL CORD STIMULATOR IMPLANT  04/03/2024    Ventral hernia repair NOS  1987       ALLERGIES:   Allergies   Allergen Reactions    Ancef [Cefazolin] Rash    Cephalexin Itching and Rash     Allergic reaction required hospitalization 4/2024    Penicillins Anaphylaxis    Sulfa Antibiotics      \"itchy rash, swelling of face and hands\"          CURRENT MEDICATIONS:  Current Facility-Administered Medications   Medication Dose Route Frequency Provider Last Rate Last Admin    acetaminophen (TYLENOL) tablet 650 mg  650 mg Oral Q4H PRN Tammie Matta PA-C        " [START ON 11/15/2024] allopurinol (ZYLOPRIM) tablet 100 mg  100 mg Oral Daily Tammie Matta PA-C        [START ON 11/15/2024] aspirin EC tablet 81 mg  81 mg Oral Daily Tammie Matta PA-C        atropine injection 0.5 mg  0.5 mg Intravenous Once PRN Tammie Matta PA-C        [START ON 11/15/2024] colchicine (COLCRYS) tablet 0.6 mg  0.6 mg Oral BID Tammie Matta PA-C        fentaNYL (PF) (SUBLIMAZE) injection 25 mcg  25 mcg Intravenous Q15 Min PRN Tammie Matta PA-C        [START ON 11/15/2024] ferrous gluconate (FERGON) tablet 324 mg  324 mg Oral Daily Tammie Matta PA-C        [START ON 11/15/2024] finasteride (PROSCAR) tablet 5 mg  5 mg Oral Daily Tammie Matta PA-C        flumazenil (ROMAZICON) injection 0.2 mg  0.2 mg Intravenous q1 min prn Tammie Matta PA-C        [START ON 11/15/2024] gabapentin (NEURONTIN) capsule 300 mg  300 mg Oral TID Tammie Matta PA-C        HOLD:  Metformin and metformin containing medications if patient received IV contrast with acute kidney injury or severe chronic kidney disease (stage IV or stage V; i.e., eGFR less than 30)   Does not apply HOLD Tammie Matta PA-C        hydrALAZINE (APRESOLINE) injection 10 mg  10 mg Intravenous Once PRN Tammie Matta PA-C        [START ON 11/15/2024] lisinopril (ZESTRIL) tablet 10 mg  10 mg Oral Q24H Tammie Matta PA-C        midazolam (VERSED) injection 0.5 mg  0.5 mg Intravenous Q5 Min PRN Tammie Matta PA-C        naloxone (NARCAN) injection 0.2 mg  0.2 mg Intravenous Q2 Min PRN Tammie Matta PA-C        Or    naloxone (NARCAN) injection 0.4 mg  0.4 mg Intravenous Q2 Min PRN Tammie Matta PA-C        Or    naloxone (NARCAN) injection 0.2 mg  0.2 mg Intramuscular Q2 Min PRN Tammie Matta PA-C        Or    naloxone (NARCAN) injection 0.4 mg  0.4 mg Intramuscular Q2 Min PRN Tammie Matta PA-C        oxyCODONE (ROXICODONE) tablet 5 mg  5 mg Oral Q4H PRN Tammie Matta PA-C        Or    oxyCODONE  (ROXICODONE) tablet 10 mg  10 mg Oral Q4H PRN Tammie Matta PA-C        sodium bicarbonate tablet 1,950 mg  1,950 mg Oral TID Tammie Matta PA-C        sodium chloride 0.9 % infusion  75 mL/hr Intravenous Continuous DiannentTammie milton, PA-C        sodium chloride 0.9% BOLUS 250 mL  250 mL Intravenous Once PRN Tammie Matta PA-C        [START ON 11/15/2024] torsemide (DEMADEX) tablet 5 mg  5 mg Oral Daily Tammie Matta PA-ANAIS        ursodiol (ACTIGALL) capsule 300 mg  300 mg Oral BID Tammie Matta PA-ANAIS             FAMILY HISTORY:   Family History   Problem Relation Age of Onset    Family History Negative Mother          88 yo    Cancer Father          74 yo brain    Diabetes Maternal Grandfather          91 yo    Alcohol/Drug Paternal Grandfather             Colon Cancer No family hx of          SOCIAL HISTORY:   Social History     Socioeconomic History    Marital status:      Spouse name: Not on file    Number of children: Not on file    Years of education: Not on file    Highest education level: Not on file   Occupational History     Employer: SELF   Tobacco Use    Smoking status: Former     Current packs/day: 0.00     Types: Cigarettes     Quit date: 3/17/1993     Years since quittin.6     Passive exposure: Never    Smokeless tobacco: Never   Vaping Use    Vaping status: Never Used   Substance and Sexual Activity    Alcohol use: Not Currently    Drug use: No    Sexual activity: Never   Other Topics Concern    Parent/sibling w/ CABG, MI or angioplasty before 65F 55M? Not Asked   Social History Narrative    Not on file     Social Drivers of Health     Financial Resource Strain: Not on file   Food Insecurity: Not on file   Transportation Needs: Not on file   Physical Activity: Not on file   Stress: Not on file   Social Connections: Not on file   Interpersonal Safety: Low Risk  (3/27/2024)    Interpersonal Safety     Do you feel physically and emotionally safe  where you currently live?: Yes     Within the past 12 months, have you been hit, slapped, kicked or otherwise physically hurt by someone?: No     Within the past 12 months, have you been humiliated or emotionally abused in other ways by your partner or ex-partner?: No   Housing Stability: Not on file       REVIEW OF SYSTEMS:  CONSTITUTIONAL: No weight loss, fever   SKIN: No rash  CARDIOVASCULAR: No chest pain or chest discomfort. No palpitations or edema.   RESPIRATORY: No shortness of breath or cough.  GASTROINTESTINAL: No nausea, vomiting or diarrhea. No abdominal pain.  NEUROLOGICAL: No headache, dizziness, syncope  HEMATOLOGIC: No anemia, bleeding or bruising.    LABORATORY STUDIES:   Lab Results   Component Value Date    WBC 10.6 11/14/2024    HGB 10.3 (L) 11/14/2024    HCT 33.6 (L) 11/14/2024    MCV 93 11/14/2024     11/14/2024      Lab Results   Component Value Date    BUN 23.0 11/14/2024     11/14/2024    CO2 25 11/14/2024     Lab Results   Component Value Date    A1C 8.0 06/18/2024    A1C 8.0 02/27/2024    A1C 7.3 07/19/2023    A1C 7.7 04/27/2023    A1C 7.2 09/09/2022    A1C 8.1 04/29/2021    A1C 7.1 07/27/2020    A1C 7.7 02/19/2020    A1C 6.3 03/25/2019    A1C 6.4 10/17/2018        EKG 11/14/2024:   Sinus rhythm w/ PVCs and PACs   Possible ST elevation in precordial leads  Rate of 96 bpm    CARDIAC CATHETERIZATION  11/14/2024:   Diagnostic  Dominance: Co-dominant  Left Main   Ost LM to Mid LM lesion is 80% stenosed. The lesion is eccentric. The lesion is severely calcified. The lesion was not previously treated. The stenosis was measured using IVUS. Ultrasound (IVUS) was performed. Minimum lumen area: 3.2 mm . Severe plaque burden was detected. IVUS has determined that the lesion is calcified and eccentric.      Left Anterior Descending   Prox LAD to Mid LAD lesion is 30% stenosed.      Left Circumflex      First Left Posterolateral Branch   The vessel is large.      Second Left Posterolateral  Branch   2nd LPL lesion is 50% stenosed.      Right Coronary Artery   Prox RCA lesion is 100% stenosed.      Right Ventricular Branch   Collaterals   RV Branch filled by collaterals from Prox RCA.          TRANSTHORACIC ECHOCARDIOGRAM 11/14/2024:   Interpretation Summary     The visual ejection fraction is 25-30% with severe global hypokinesis although  the septum, anterior and inferior walls look nearly akinetic..  Suspect mild to moderate decrease in RV systolic function.  There is mild (1+) mitral regurgitation.  Moderate aortic valve calcification is present without stenosis. There is mild  (1+) aortic regurgitation.  The right ventricular systolic pressure is approximated at 41mmHg.  Compared to the prior study dated 8/12/2024, there are changes as noted. There  has been a severe decline in LV systolic function.    CAROTID ULTRASOUND 11/14/2024:  IMPRESSION:  1.  Mild plaque formation, velocities consistent with less than 50% stenosis in the right internal carotid artery.  2.  Mild plaque formation, velocities consistent with less than 50% stenosis in the left internal carotid artery.  3.  Flow within the vertebral arteries is antegrade.    NONCONTRAST CHEST CT:  - Ordered and Pending    PET CARDIAC VIABILITY:  - Ordered and Pending    IMPRESSION AND PLAN: Lance Ibrahim is a 79 year old with severe multi-vessel coronary artery disease and NSTEMI. Echocardiography demonstrates left ventricular systolic dysfunction with an LVEF of 25-30%. After review of the above information, we believe that he may be a reasonable surgical candidate pending the remainder of his workup. His calculated STS risk for mortality is 7.77%. The calculated risk of major morbidity or mortality is 24.6% with risk of permanent stroke being 4.4%.     - Tentative plan for CABG on 11/19 PM w/ Dr. Evans pending patient consent and further testing.   - Will need noncon chest CT, and viability study to complete workup (ordered).    - Remainder  of cares per primary team.       Thank you very much for this referral.       Patient and plan discussed with Dr. Evans.      STS RISK:            Ernestina Brar PA-C  [facility] [place] Cardiothoracic Surgery  Vocera or Secure Chat  November 15, 2024

## 2024-11-14 NOTE — PHARMACY-ADMISSION MEDICATION HISTORY
Admission medication history completed at [facility]. Please see Pharmacy Intern Admission Medication History note from 11/14/2024.    Nicollette McMann, PharmD, BCPS    Addendum: RN called to notify pharmacy that patient takes Relaxium for sleep so added to med list.     Margarita Hilton, PharmD on 11/15/2024 at 12:40 AM

## 2024-11-14 NOTE — ED TRIAGE NOTES
Pt presents to the ED with c/o worsening SOB and chest tightness since 0000. Pt states sx worsen when laying down. Denies cough or fevers.      Triage Assessment (Adult)       Row Name 11/14/24 1017          Triage Assessment    Airway WDL WDL        Respiratory WDL    Respiratory WDL X  SOB        Skin Circulation/Temperature WDL    Skin Circulation/Temperature WDL WDL        Cardiac WDL    Cardiac WDL X;chest pain        Chest Pain Assessment    Chest Pain Location midsternal     Character aching;tightness

## 2024-11-14 NOTE — CONSULTS
Cardiology Consult Note    Thank you, Dr. Gemini Long, for asking the [facility] team to see Lance Ibrahim in consultation at [facility] ED to evaluate possible STEMI.      Assessment:   1.  Possible STEMI with ECG showing subtle ST elevation in the anterior precordial leads.  Patient with report of chest tightness beginning last evening, not improved with Primatene Mist, along with symptoms of orthopnea.  Stat echocardiogram demonstrates severe decline in LV systolic function with an ejection fraction of approximately 25 to 30% with some segmental wall motion abnormality suggesting acute coronary syndrome.  Recommended activating STEMI protocol.  Patient given a bolus of heparin and initiated on heparin drip.  Because of persistent chest discomfort despite sublingual nitroglycerin, he was started on low-dose nitroglycerin infusion.  Patient will be transported to [facility] for emergent angiography.  2.  Coronary artery disease with documented moderate left main stenosis which was not hemodynamically significant on angiography in March 2019.  Mild to moderate disease noted throughout the rest of his coronary tree with subtotal stenosis of the apical LAD.  Medical management recommended.  3.  Nonischemic cardiomyopathy thought secondary to hypertension diagnosed in 2019.  Recent echocardiogram in August 2024 demonstrated normal LV systolic function with an ejection fraction of 55 to 60%.  No significant valve disease.  4.  Essential hypertension, previously controlled  5.  Chronic anemia  6.  Chronic HFpEF, previously well compensated.  Patient reports no recent symptoms of orthopnea, PND or lower extremity edema.    Clinically Significant Risk Factors Present on Admission               # Hypoalbuminemia: Lowest albumin = 3 g/dL at 11/14/2024 10:23 AM, will monitor as appropriate  # Coagulation Defect: INR = 2.04 (Ref range: 0.85 - 1.15) and/or PTT = >240 Seconds (Ref range:  22 - 38 Seconds), will monitor for bleeding  # Drug Induced Platelet Defect: home medication list includes an antiplatelet medication   # Hypertension: Noted on problem list  # Chronic heart failure with preserved ejection fraction: heart failure noted on problem list and last echo with EF >50%     # Anemia: based on hgb <11      # DMII: A1C = N/A within past 6 months   # Overweight: Estimated body mass index is 26.04 kg/m  as calculated from the following:    Height as of an earlier encounter on 11/14/24: 1.829 m (6').    Weight as of an earlier encounter on 11/14/24: 87.1 kg (192 lb).       # Financial/Environmental Concerns:           Plan:   1.  STEMI protocol activated following echocardiogram showing severe decline in LV function over the past 3 months.  Further recommendations pending results of the angiogram         Primary cardiologist: Dr. Med Amaya     Current History:   Mr. Lance Ibrahim is a 79 year old male with history of known coronary artery disease with moderate stenosis involving the distal left main which was not hemodynamically significant in March 2019 with mild to moderate disease scattered in the remainder of the coronary tree, nonischemic cardiomyopathy diagnosed in 2019 thought possibly related to hypertension with subsequent normalization of LV function, essential hypertension, history of CVA without residual, mild CKD who presented to the ED with complaint of chest tightness and shortness of breath beginning last evening.  Patient states he had been feeling well up until last night when he suddenly developed a constricting feeling across his chest going from armpit to armpit.  Had never experienced the discomfort previously.  Did feel short of breath.  Contacted the [employer] across the street and had Primatene Mist delivered to his assisted living facility which he tried without any benefit.  This morning because of ongoing symptoms, paramedics were summoned and he was brought to the  ED for evaluation.  His initial ECG showed subtle ST elevations in the anterior leads which looks slightly different from previous ECGs although he does have LVH by voltage.  I was contacted by the ED physician and recommended a stat echocardiogram which showed severe decline in LV function compared to his prior study in August.  Based on that and his symptoms, recommended STEMI activation.    Past Medical History:     Past Medical History:   Diagnosis Date    Anemia     Arthritis     osteoarthritis knees    BPH     CAD (coronary artery disease)     subtotal occlusion in the small distal LAD     Cardiomyopathy     Cerebral artery occlusion with cerebral infarction     TIA 1993, no residual    Cervicalgia     CHF (congestive heart failure) (H)     Chronic kidney disease     Chronic low back pain     Chronic rhinitis     Gouty arthropathy     hands    Hyperlipidemia     Hypertension     Kidney stone     Nocturia     Obesity     Osteomyelitis (H) 08/2023    Foot    PVD (peripheral vascular disease)     Sleep apnea     doesn't use cpap    Spinal cord stimulator status 04/2024    Spinal cord stimulator in place    TIA (transient ischaemic attack) 1993    Type 2 diabetes mellitus - 11/08/2018    Ureteral stone        Past Surgical History:     Past Surgical History:   Procedure Laterality Date    AMPUTATE FOOT Right 08/07/2023    Procedure: AMPUTATION, right hallux;  Surgeon: Nakul Salazar DPM;  Location: [facility] OR    AMPUTATE TOE(S) Left 10/15/2018    Procedure: AMPUTATE TOE(S);  Left third toe amputation;  Surgeon: Ayaka Azevedo DPM, Podiatry/Foot and Ankle Surgery;  Location: [facility] OR    ARTHROPLASTY KNEE Right 12/07/2018    Procedure: Right total knee arthroplasty;  Surgeon: Issa Cunha MD;  Location:  OR    COLONOSCOPY  03/09/2013    Procedure: COLONOSCOPY;  COLONOSCOPY;  Surgeon: Chau Hogan MD;  Location:  GI    CV HEART CATHETERIZATION WITH POSSIBLE INTERVENTION Left 03/05/2019     Procedure: Coronary Angiogram;  Surgeon: Nas Linda MD;  Location:  HEART CARDIAC CATH LAB    Diastasis recti repair      ENDOSCOPIC RETROGRADE CHOLANGIOPANCREATOGRAM N/A 2024    Procedure: ENDOSCOPIC RETROGRADE CHOLANGIOPANCREATOGRAPHY, BILIARY SPHINCTEROTOMY, DILATION, BRUSHING, BIOPSIES with DISTAL EXTRA HEPATIC BILE DUCT STRICTURE;  Surgeon: Aldo Gongora MD;  Location: [facility] OR    ESOPHAGOSCOPY, GASTROSCOPY, DUODENOSCOPY (EGD), COMBINED N/A 2024    Procedure: ESOPHAGOGASTRODUODENOSCOPY, WITH ENDOSCOPIC ULTRASOUND GUIDANCE;  Surgeon: Aldo Gongora MD;  Location: [facility] [place] OR    EXTRACAPSULAR CATARACT EXTRATION WITH INTRAOCULAR LENS IMPLANT Left 2017    EXTRACAPSULAR CATARACT EXTRATION WITH INTRAOCULAR LENS IMPLANT Right     FOOT SURGERY  2013    cyst removal     HERNIA REPAIR  2004    ventral     IR DISC ASPIRATION INJECTION  2024    IRRIGATION AND DEBRIDEMENT SHOULDER, COMBINED Right 2024    Procedure: IRRIGATION AND DEBRIDEMENT, SHOULDER;  Surgeon: Terence Hanson MD;  Location: [facility] OR    IRRIGATION AND DEBRIDEMENT UPPER EXTREMITY, COMBINED Right 2024    Procedure: IRRIGATION AND DEBRIDEMENT, ELBOW AND THUMB;  Surgeon: Terence Hanson MD;  Location: [facility] OR    ORIF Shoulder Left     PROSTATE SURGERY  2024    Urolift    RESECT CLAVICLE DISTAL Right 2024    Procedure: EXCISION, CLAVICLE, DISTAL;  Surgeon: Terence Hanson MD;  Location: [facility] OR    SPINAL CORD STIMULATOR IMPLANT  2024    Ventral hernia repair NOS  1987       Family History:     Family History   Problem Relation Age of Onset    Family History Negative Mother          86 yo    Cancer Father          74 yo brain    Diabetes Maternal Grandfather          89 yo    Alcohol/Drug Paternal Grandfather             Colon Cancer No family hx of        Social History:     reports that he quit smoking about 31 years ago. His smoking use included cigarettes. He has never been exposed to tobacco smoke. He has never used smokeless tobacco. He reports that he does not currently use alcohol. He reports that he does not use drugs.    Meds:   No current facility-administered medications for this encounter.  No current facility-administered medications for this encounter.       Allergies:   Ancef [cefazolin], Cephalexin, Penicillins, and Sulfa antibiotics    Review of Systems:   A 12 point comprehensive review of systems was negative except as noted in the current history.    Objective:      Physical Exam  There is no height or weight on file to calculate BMI.  There were no vitals taken for this visit.    General Appearance:   Well-developed, well-nourished elderly male who appears in mild distress due to ongoing 3/10 chest tightness   HEENT:  Normocephalic, atraumatic.  Sclera nonicteric.  Mucous membranes moist   Neck: No jugular venous distention   Chest: Symmetric with normal AP diameter   Lungs:   Clear to auscultation bilaterally   Cardiovascular:   Regular rate and rhythm with rare ectopic beat.  S1, S2 normal.  2/6 systolic crescendo decrescendo murmur heard at the left upper sternal border.   Abdomen:  Soft, nondistended, nontender.  No organomegaly or mass   Extremities: Trace pedal edema bilaterally.  Distal pulses are diminished   Skin: No rash or lesions   Neurologic: Alert and oriented x 3.  No gross focal deficit             Cardiographics (personally reviewed)  ECG demonstrates sinus rhythm with occasional PACs and PVCs, left ventricular hypertrophy by voltage.  There is subtle ST elevation in the anterior precordial leads which appears changed from previous ECG.  No ST depression.    Imaging (personally reviewed)    Procedure  Complete Echo Adult. Definity (NDC #05689-257) given  "intravenously.  ______________________________________________________________________________  Interpretation Summary     The visual ejection fraction is 25-30% with severe global hypokinesis although  the septum, anterior and inferior walls look nearly akinetic..  Suspect mild to moderate decrease in RV systolic function.  There is mild (1+) mitral regurgitation.  Moderate aortic valve calcification is present without stenosis. There is mild  (1+) aortic regurgitation.  The right ventricular systolic pressure is approximated at 41mmHg.  Compared to the prior study dated 8/12/2024, there are changes as noted. There  has been a severe decline in LV systolic function.    Lab Review (personally reviewed all results)  Recent Labs   Lab Test 04/27/23  1248   CHOL 108   HDL 52   LDL 39   TRIG 87     Recent Labs   Lab Test 04/27/23  1248 06/16/22  0934 09/29/21  0913   LDL 39 67 48     Recent Labs   Lab Test 11/14/24  1023      POTASSIUM 3.5   CHLORIDE 98   CO2 25   *   BUN 23.0   CR 1.45*   GFRESTIMATED 49*   SHANNAN 8.6*     Recent Labs   Lab Test 11/14/24  1023 09/10/24  1130 08/20/24  0629   CR 1.45* 1.34* 1.17     Recent Labs   Lab Test 06/18/24  0845 02/27/24  1113 07/19/23  0904   A1C 8.0* 8.0* 7.3*          Recent Labs   Lab Test 11/14/24  1023   WBC 10.6   HGB 10.3*   HCT 33.6*   MCV 93        Recent Labs   Lab Test 11/14/24  1023 09/10/24  1130 08/30/24  1210   HGB 10.3* 10.4* 10.6*    No results for input(s): \"TROPONINI\" in the last 64944 hours.  Recent Labs   Lab Test 11/14/24  1023 05/17/24  1655 05/16/24  1838 04/26/24  0430 04/25/24  2329 02/28/20  1318   NTBNPI 19,613* 2,058* 3,157*  --   --   --    NTBNP  --   --   --  959 1,078 178     Recent Labs   Lab Test 11/14/24  1023   TSH 4.28*     Recent Labs   Lab Test 11/14/24  1123 06/19/24  1316 04/26/24  0430   INR 2.04* 1.04 1.11                 "

## 2024-11-14 NOTE — PHARMACY-ADMISSION MEDICATION HISTORY
Pharmacy Intern Admission Medication History    Admission medication history is complete. The information provided in this note is only as accurate as the sources available at the time of the update.    Information Source(s): Facility ([facility]/) medication list/MAR via N/A    Changes made to PTA medication list:  Added: Fortify probiotic, Nortriptyline  Deleted: Primatene Mist, lactobacillus. Methocarbamol, Lidocaine patch, Oxycodone  Changed: Lantus 8 units to 10 units, Torsemide 10 mg to 5 mg, Flonase directions    Allergies reviewed with patient and updates made in EHR: unable to assess    Medication History Completed By: Ekta Richard 11/14/2024 11:37 AM    PTA Med List   Medication Sig Last Dose/Taking    acetaminophen (TYLENOL) 325 MG tablet Take 2 tablets (650 mg) by mouth every 6 hours as needed for pain (and adjunct with moderate or severe pain or per patient request) Unknown    allopurinol (ZYLOPRIM) 100 MG tablet Take 100 mg by mouth daily. 11/14/2024 at  8:00 AM    aspirin 81 MG EC tablet Take 1 tablet (81 mg) by mouth daily. 11/14/2024 at  8:00 AM    calcium carbonate (TUMS) 500 MG chewable tablet Take 1,000 mg by mouth 3 times daily as needed for heartburn Unknown    colchicine (COLCRYS) 0.6 MG tablet Take 1 tablet (0.6 mg) by mouth 2 times daily 11/14/2024 at  8:00 AM    diclofenac (VOLTAREN) 1 % topical gel Apply 4 g topically 4 times daily as needed for moderate pain Left knee Unknown    ferrous gluconate (FERGON) 324 (38 Fe) MG tablet Take 1 tablet (324 mg) by mouth daily 11/14/2024 at  8:00 AM    finasteride (PROSCAR) 5 MG tablet Take 5 mg by mouth daily 11/14/2024 at  8:00 AM    fluticasone (FLONASE) 50 MCG/ACT nasal spray Spray 1 spray in nostril daily as needed (congestion). Unknown    gabapentin (NEURONTIN) 300 MG capsule Take 300 mg by mouth 3 times daily 11/14/2024 at  8:00 AM    glipiZIDE (GLUCOTROL XL) 2.5 MG 24 hr tablet Take 2.5 mg by mouth daily 11/14/2024 at  8:00 AM    ibuprofen  (ADVIL/MOTRIN) 400 MG tablet Take 400 mg by mouth every 6 hours as needed for moderate pain. Unknown    insulin glargine (LANTUS PEN) 100 UNIT/ML pen Inject 10 Units subcutaneously 2 times daily. Hold if blood sugar < 100 mg/dL. 11/14/2024 at  8:00 AM    lisinopril (ZESTRIL) 10 MG tablet Take 1 tablet (10 mg) by mouth every 24 hours 11/14/2024 at  8:00 AM    loperamide (IMODIUM A-D) 2 MG tablet Take 2 mg by mouth 4 times daily as needed for diarrhea Unknown    nortriptyline (PAMELOR) 10 MG capsule Take 2 capsules by mouth daily as needed for other (MDD). Unknown    polyethylene glycol-propylene glycol (SYSTANE ULTRA) 0.4-0.3 % SOLN ophthalmic solution Place 1 drop into both eyes daily. 11/14/2024 at  8:00 AM    Probiotic Product (FORTIFY 50 BILLION PROBIOT 50+) CPDR Take 1 capsule by mouth daily. 11/13/2024 Noon    sodium bicarbonate 650 MG tablet Take 3 tablets (1,950 mg) by mouth 3 times daily 11/14/2024 at  8:00 AM    torsemide (DEMADEX) 10 MG tablet Take 0.5 tablets by mouth daily. 11/14/2024 at  8:00 AM    ursodiol (ACTIGALL) 300 MG capsule Take 1 capsule (300 mg) by mouth 2 times daily 11/14/2024 at  8:00 AM

## 2024-11-14 NOTE — ED PROVIDER NOTES
EMERGENCY DEPARTMENT ENCOUNTER      NAME: Lance Ibrahim  AGE: 79 year old male  YOB: 1945  MRN: [id number]  EVALUATION DATE & TIME: 11/14/2024 10:29 AM    PCP: Rickey Adamson    ED PROVIDER: Gemini Long M.D.      Chief Complaint   Patient presents with    Shortness of Breath    Chest Pain         FINAL IMPRESSION:  1. ST elevation myocardial infarction (STEMI), unspecified artery (H)          ED COURSE & MEDICAL DECISION MAKING:    ED Course as of 11/14/24 1206   Thu Nov 14, 2024   1039 Pt with orthopnea and PND with new leg edema despite compliance with torsemide and no dose change recently with CHF history and with chart review revealing echo 8/2024 with some left ventricular hypertrophy with preserved EF, new ST elevation and more convex ST in V1-V3 compared to prior EKG with 3/10 chest tightness with his shortness of breath, no hypoxia, no rales. No O2 started with no hypoxia but given NTG with ASA and cardiology stat paged, no STEMI called without reciprocal depressions, no stents in place, but will defer to cardiology team. I spoke with SAM Hilton who stat paged caridology re: case, given no auscultated pulmonary edema or rales/tachypnea no bipap begun at this point. Patient ok with plan to admit, will d/w cardiology heparinization with chest pain and wehther they recommend calling STEMI alert   1044 I spoke with Dr Hwang re: case and she reviewed EKG, history  with cath 4 years ago with questionable developing left main disease 30-40%, FFR without significant obstruction, mild to moderate disease elsewhere, recommends stat echo to see if wall motion abnormality, do not call STEMI, do not start heparin, she will come see patient also   1048 Charge RN and SAM called echo tech, echo stat coming to ED and aware this is per cardiology   1111 Dr Hwang at bedside, reviewed echo underway, with EF in 30s newly compared to Augutst 2024 thus she indicates with new wall movement abnormalities and chest pain  and EKG changes, call STEMI. STEMI called, charge RN and HUC updated, Dr Hwang calling interventional cardiologist now   1114 Per Charge RN, [facility] here in 10 min, EMTALA form filled out and on chart and charge RN updated that form on chart   1119 Pt reassessed, Eri at bedside, she indicates NO brilinta in case diffuse disease necessitating CABG, given his h/o CAD on prior cath rather diffusely. She also requests NTG gtt with still 3/10 chest pain despite NTG SL with BP still in 130-140 range systolic, NTG gtt ordered and RN updated, patient ok with plan to [facility] cath lab       10:32 AM I met with the patient to gather history and to perform my initial exam. We discussed plans for the ED course, including diagnostic testing and treatment.    10:45 AM Spoke with Cardiology, Dr. Hwang.   11:10 AM Spoke with Cardiology in person, Dr. Hwang, after she performed a bedside echocardiogram and she now recommends activating a code STEMI.     Pertinent Labs & Imaging studies reviewed. (See chart for details)    Medical Decision Making  Obtained supplemental history:Supplemental history obtained?: No  Reviewed external records: External records reviewed?: No  Care impacted by chronic illness:Documented in Chart, Cerebrovascular Disease, Diabetes, Heart Disease, Hyperlipidemia, Hypertension, and Peripheral Vascular Disease  Did you consider but not order tests?: Work up considered but not performed and documented in chart, if applicable  Did you interpret images independently?: Independent interpretation of ECG and images noted in documentation, when applicable.  Consultation discussion with other provider:Did you involve another provider (consultant, MH, pharmacy, etc.)?: I discussed the care with another health care provider, see documentation for details.  Admit.    MIPS: Not Applicable    Critical Care time was 45 minutes for this patient excluding procedures.        At the conclusion  of the encounter I discussed the results of all of the tests and the disposition. The questions were answered. The patient or family acknowledged understanding and was agreeable with the care plan.     MEDICATIONS GIVEN IN THE EMERGENCY:  Medications   aspirin (ASA) tablet 325 mg (325 mg Oral $Given 11/14/24 1049)   furosemide (LASIX) injection 60 mg (60 mg Intravenous $Given 11/14/24 1055)   heparin loading dose for LOW INTENSITY TREATMENT * Give BEFORE starting heparin infusion (5,250 Units Intravenous $Given 11/14/24 1117)   perflutren lipid microsphere (DEFINITY) injection SUSP 2 mL (2 mLs Intravenous $Given 11/14/24 1120)       NEW PRESCRIPTIONS STARTED AT TODAY'S ER VISIT  Discharge Medication List as of 11/14/2024 11:43 AM             =================================================================    HPI    Lance Ibrahim is a 79 year old male with PMHx of CHF, CAD, HLD, HTN, PVD, CKD (stage 1), T2DM, stroke, chronic anemia, gout, obesity, who presents to the ED today via EMS for evaluation of chest pain and shortness of breath.    Patient reports having chest pain and shortness of breath since last night. He states that his chest pain is characterized as upper chest tightness and constriction in the upper chest. He states that his chest pain has alleviated since last night, but he endorses having constant chest pain within the ED that rates a 3/10 in intensity. He also endorses his shortness of breath alleviating since last night, but he additionally endorses having constant mild shortness of breath within the ED. He endorses the supplemental oxygen he was provided by EMS en route to the ED providing relif of his symptoms. He denies being provides any aspirin or nitroglycerin by EMS, but he notes he takes 81 mg ASA daily. He endorses his chest pain and shortness of breath being provoked with laying flat last night, which he notes led him to spending last night sitting up in a chair. He denies developing any  similar chest pain in the past. He endorses having chronic bilateral leg swelling at baseline, but he mentions that he has been having new bilateral leg swelling since last night.  He notes that he takes Torsemide as it is provided to him at his senior living facility daily (including both last night and this morning). He denies any other complications at this time.       REVIEW OF SYSTEMS   All other systems reviewed and are negative except as noted above in HPI.    PAST MEDICAL HISTORY:  Past Medical History:   Diagnosis Date    Anemia     Arthritis     osteoarthritis knees    BPH     CAD (coronary artery disease)     subtotal occlusion in the small distal LAD     Cardiomyopathy     Cerebral artery occlusion with cerebral infarction     TIA 1993, no residual    Cervicalgia     CHF (congestive heart failure) (H)     Chronic kidney disease     Chronic low back pain     Chronic rhinitis     Gouty arthropathy     hands    Hyperlipidemia     Hypertension     Kidney stone     Nocturia     Obesity     Osteomyelitis (H) 08/2023    Foot    PVD (peripheral vascular disease)     Sleep apnea     doesn't use cpap    Spinal cord stimulator status 04/2024    Spinal cord stimulator in place    TIA (transient ischaemic attack) 1993    Type 2 diabetes mellitus - 11/08/2018    Ureteral stone        PAST SURGICAL HISTORY:  Past Surgical History:   Procedure Laterality Date    AMPUTATE FOOT Right 08/07/2023    Procedure: AMPUTATION, right hallux;  Surgeon: Nakul Salazar DPM;  Location: [facility] Main OR    AMPUTATE TOE(S) Left 10/15/2018    Procedure: AMPUTATE TOE(S);  Left third toe amputation;  Surgeon: Ayaka Azevedo DPM, Podiatry/Foot and Ankle Surgery;  Location:  OR    ARTHROPLASTY KNEE Right 12/07/2018    Procedure: Right total knee arthroplasty;  Surgeon: Issa Cunha MD;  Location:  OR    COLONOSCOPY  03/09/2013    Procedure: COLONOSCOPY;  COLONOSCOPY;  Surgeon: Chau Hogan MD;  Location:  GI    CV  HEART CATHETERIZATION WITH POSSIBLE INTERVENTION Left 03/05/2019    Procedure: Coronary Angiogram;  Surgeon: Nas Linda MD;  Location:  HEART CARDIAC CATH LAB    Diastasis recti repair  1985    ENDOSCOPIC RETROGRADE CHOLANGIOPANCREATOGRAM N/A 04/30/2024    Procedure: ENDOSCOPIC RETROGRADE CHOLANGIOPANCREATOGRAPHY, BILIARY SPHINCTEROTOMY, DILATION, BRUSHING, BIOPSIES with DISTAL EXTRA HEPATIC BILE DUCT STRICTURE;  Surgeon: Aldo Gongora MD;  Location: [facility] OR    ESOPHAGOSCOPY, GASTROSCOPY, DUODENOSCOPY (EGD), COMBINED N/A 04/30/2024    Procedure: ESOPHAGOGASTRODUODENOSCOPY, WITH ENDOSCOPIC ULTRASOUND GUIDANCE;  Surgeon: Aldo Gongora MD;  Location: [facility] OR    EXTRACAPSULAR CATARACT EXTRATION WITH INTRAOCULAR LENS IMPLANT Left 03/13/2017    EXTRACAPSULAR CATARACT EXTRATION WITH INTRAOCULAR LENS IMPLANT Right     FOOT SURGERY  04/2013    cyst removal     HERNIA REPAIR  07/13/2004    ventral     IR DISC ASPIRATION INJECTION  6/19/2024    IRRIGATION AND DEBRIDEMENT SHOULDER, COMBINED Right 8/7/2024    Procedure: IRRIGATION AND DEBRIDEMENT, SHOULDER;  Surgeon: Terence Hanson MD;  Location: [facility] OR    IRRIGATION AND DEBRIDEMENT UPPER EXTREMITY, COMBINED Right 8/7/2024    Procedure: IRRIGATION AND DEBRIDEMENT, ELBOW AND THUMB;  Surgeon: Terence Hanson MD;  Location: [facility] OR    ORIF Shoulder Left     PROSTATE SURGERY  02/2024    Urolift    RESECT CLAVICLE DISTAL Right 8/7/2024    Procedure: EXCISION, CLAVICLE, DISTAL;  Surgeon: Terence Hanson MD;  Location: [facility] OR    SPINAL CORD STIMULATOR IMPLANT  04/03/2024    Ventral hernia repair NOS  1987       CURRENT MEDICATIONS:    No current outpatient medications on file.      ALLERGIES:  Allergies   Allergen Reactions    Ancef [Cefazolin] Rash    Cephalexin Itching and Rash     Allergic reaction required hospitalization 4/2024    Penicillins Anaphylaxis    Sulfa Antibiotics   "    \"itchy rash, swelling of face and hands\"       FAMILY HISTORY:  Family History   Problem Relation Age of Onset    Family History Negative Mother          86 yo    Cancer Father          74 yo brain    Diabetes Maternal Grandfather          91 yo    Alcohol/Drug Paternal Grandfather             Colon Cancer No family hx of        SOCIAL HISTORY:   Social History     Socioeconomic History    Marital status:    Occupational History     Employer: SELF   Tobacco Use    Smoking status: Former     Current packs/day: 0.00     Types: Cigarettes     Quit date: 3/17/1993     Years since quittin.6     Passive exposure: Never    Smokeless tobacco: Never   Vaping Use    Vaping status: Never Used   Substance and Sexual Activity    Alcohol use: Not Currently    Drug use: No    Sexual activity: Never     Social Drivers of Health     Interpersonal Safety: Low Risk  (3/27/2024)    Interpersonal Safety     Do you feel physically and emotionally safe where you currently live?: Yes     Within the past 12 months, have you been hit, slapped, kicked or otherwise physically hurt by someone?: No     Within the past 12 months, have you been humiliated or emotionally abused in other ways by your partner or ex-partner?: No       VITALS:  Patient Vitals for the past 24 hrs:   BP Temp Temp src Pulse Resp SpO2 Height Weight   24 1130 137/81 -- -- 92 18 96 % -- --   24 1115 (!) 140/89 -- -- 96 24 92 % -- --   24 1015 129/76 97.7  F (36.5  C) Oral 97 20 99 % 1.829 m (6') 87.1 kg (192 lb)       PHYSICAL EXAM    GENERAL: Awake, alert.  In no acute distress.   HEENT: Normocephalic, atraumatic.  Pupils equal, round and reactive.  Conjunctiva normal.  EOMI.  NECK: No stridor or apparent deformity.  PULMONARY: Symmetrical breath sounds without distress.  Lungs clear to auscultation bilaterally without wheezes, rhonchi or rales.  CARDIO: Regular rate and rhythm.  No significant murmur, rub or " gallop.  Radial pulses strong and symmetrical.  ABDOMINAL: Abdomen soft, non-distended and non-tender to palpation.  No CVAT, no palpable hepatosplenomegaly.  EXTREMITIES: No lower extremity swelling or edema.    NEURO: Alert and oriented to person, place and time.  Cranial nerves grossly intact.  No focal motor deficit.  PSYCH: Normal mood and affect  SKIN: No rashes      LAB:  All pertinent labs reviewed and interpreted.  Results for orders placed or performed during the hospital encounter of 11/14/24   XR Chest Port 1 View    Impression    IMPRESSION:     Moderate aortic atheromatous calcifications. Unchanged mild enlargement of the cardiac silhouette.    Incomplete inflation of the lower lobes with partial silhouetting of the diaphragmatic interfaces related to adjacent atelectasis. More focal atelectasis present in the medial bases, right greater than left. The lungs at and above the anjel are clear. No   peribronchial fluid cuffs. No visible pleural fluid.    Spinal stimulator terminates around T8. There are diffuse thoracic spine degenerative osteophytes.   Basic metabolic panel   Result Value Ref Range    Sodium 137 135 - 145 mmol/L    Potassium 3.5 3.4 - 5.3 mmol/L    Chloride 98 98 - 107 mmol/L    Carbon Dioxide (CO2) 25 22 - 29 mmol/L    Anion Gap 14 7 - 15 mmol/L    Urea Nitrogen 23.0 8.0 - 23.0 mg/dL    Creatinine 1.45 (H) 0.67 - 1.17 mg/dL    GFR Estimate 49 (L) >60 mL/min/1.73m2    Calcium 8.6 (L) 8.8 - 10.4 mg/dL    Glucose 288 (H) 70 - 99 mg/dL   Hepatic function panel   Result Value Ref Range    Protein Total 7.1 6.4 - 8.3 g/dL    Albumin 3.0 (L) 3.5 - 5.2 g/dL    Bilirubin Total 1.6 (H) <=1.2 mg/dL    Alkaline Phosphatase 393 (H) 40 - 150 U/L    AST 38 0 - 45 U/L    ALT 33 0 - 70 U/L    Bilirubin Direct 1.22 (H) 0.00 - 0.30 mg/dL   Result Value Ref Range    Lipase 40 13 - 60 U/L   Result Value Ref Range    Troponin T, High Sensitivity 45 (H) <=22 ng/L   Result Value Ref Range    Magnesium 2.0 1.7 -  2.3 mg/dL   Nt probnp inpatient (BNP)   Result Value Ref Range    N terminal Pro BNP Inpatient 19,613 (H) 0 - 1,800 pg/mL   TSH with free T4 reflex   Result Value Ref Range    TSH 4.28 (H) 0.30 - 4.20 uIU/mL   CBC with platelets and differential   Result Value Ref Range    WBC Count 10.6 4.0 - 11.0 10e3/uL    RBC Count 3.62 (L) 4.40 - 5.90 10e6/uL    Hemoglobin 10.3 (L) 13.3 - 17.7 g/dL    Hematocrit 33.6 (L) 40.0 - 53.0 %    MCV 93 78 - 100 fL    MCH 28.5 26.5 - 33.0 pg    MCHC 30.7 (L) 31.5 - 36.5 g/dL    RDW 17.2 (H) 10.0 - 15.0 %    Platelet Count 387 150 - 450 10e3/uL    % Neutrophils 79 %    % Lymphocytes 13 %    % Monocytes 6 %    % Eosinophils 1 %    % Basophils 1 %    % Immature Granulocytes 1 %    NRBCs per 100 WBC 0 <1 /100    Absolute Neutrophils 8.4 (H) 1.6 - 8.3 10e3/uL    Absolute Lymphocytes 1.3 0.8 - 5.3 10e3/uL    Absolute Monocytes 0.7 0.0 - 1.3 10e3/uL    Absolute Eosinophils 0.2 0.0 - 0.7 10e3/uL    Absolute Basophils 0.1 0.0 - 0.2 10e3/uL    Absolute Immature Granulocytes 0.1 <=0.4 10e3/uL    Absolute NRBCs 0.0 10e3/uL   Result Value Ref Range    Free T4 1.43 0.90 - 1.70 ng/dL   Result Value Ref Range    INR 2.04 (H) 0.85 - 1.15   Partial thromboplastin time   Result Value Ref Range    aPTT >240 () 22 - 38 Seconds   ECG 12-LEAD WITH MUSE (LHE)   Result Value Ref Range    Systolic Blood Pressure  mmHg    Diastolic Blood Pressure  mmHg    Ventricular Rate 96 BPM    Atrial Rate 96 BPM    IL Interval 190 ms    QRS Duration 118 ms     ms    QTc 500 ms    P Axis 70 degrees    R AXIS -31 degrees    T Axis 116 degrees    Interpretation ECG       Sinus rhythm with occasional Premature ventricular complexes and Premature atrial complexes  Left axis deviation  Left ventricular hypertrophy with QRS widening and repolarization abnormality  Abnormal ECG  When compared with ECG of 10-Sep-2024 10:50,  Premature ventricular complexes are now Present  Premature atrial complexes are now Present  QT has  lengthened  Confirmed by SEE ED PROVIDER NOTE FOR, ECG INTERPRETATION (4000),  Di Ibrahim ([id number]) on 11/14/2024 10:31:30 AM         RADIOLOGY:  Reviewed all pertinent imaging. Please see official radiology report.  XR Chest Port 1 View   Final Result   IMPRESSION:       Moderate aortic atheromatous calcifications. Unchanged mild enlargement of the cardiac silhouette.      Incomplete inflation of the lower lobes with partial silhouetting of the diaphragmatic interfaces related to adjacent atelectasis. More focal atelectasis present in the medial bases, right greater than left. The lungs at and above the anjel are clear. No    peribronchial fluid cuffs. No visible pleural fluid.      Spinal stimulator terminates around T8. There are diffuse thoracic spine degenerative osteophytes.      Echocardiogram Complete    (Results Pending)         EKG:    Reviewed and interpreted as:   Performed at: 14-Nov-2024, 10:19  Vent. rate: 96 BPM  Sinus rhythm. ST elevation V1-V3 without reciprocal depression.    Compared with ECG of 10-Sep-2024, 10:50.       I have independently reviewed and interpreted the EKG(s) documented above.        I, Charanjit Flores, am serving as a scribe to document services personally performed by Dr. Gemini Long based on my observation and the provider's statements to me. I, Gemini Long MD attest that Charanjit Flores is acting in a scribe capacity, has observed my performance of the services and has documented them in accordance with my direction.       Gemini Long MD  11/14/24 2002

## 2024-11-14 NOTE — Clinical Note
Lab called critical lab value- PTT> 240. Pt was given heparin bolus at [facility] and started on a heparin gtt. Heparin gtt turned off upon arrival in CV lab.

## 2024-11-15 ENCOUNTER — APPOINTMENT (OUTPATIENT)
Dept: PET IMAGING | Facility: HOSPITAL | Age: 79
End: 2024-11-15
Payer: COMMERCIAL

## 2024-11-15 ENCOUNTER — PREP FOR PROCEDURE (OUTPATIENT)
Dept: CARDIOLOGY | Facility: CLINIC | Age: 79
End: 2024-11-15
Payer: COMMERCIAL

## 2024-11-15 ENCOUNTER — APPOINTMENT (OUTPATIENT)
Dept: NUCLEAR MEDICINE | Facility: HOSPITAL | Age: 79
End: 2024-11-15
Attending: INTERNAL MEDICINE
Payer: COMMERCIAL

## 2024-11-15 ENCOUNTER — APPOINTMENT (OUTPATIENT)
Dept: CT IMAGING | Facility: HOSPITAL | Age: 79
End: 2024-11-15
Payer: COMMERCIAL

## 2024-11-15 DIAGNOSIS — I25.10 CAD (CORONARY ARTERY DISEASE): Primary | ICD-10-CM

## 2024-11-15 LAB
ANION GAP SERPL CALCULATED.3IONS-SCNC: 13 MMOL/L (ref 7–15)
BUN SERPL-MCNC: 20.9 MG/DL (ref 8–23)
CALCIUM SERPL-MCNC: 8.4 MG/DL (ref 8.8–10.4)
CHLORIDE SERPL-SCNC: 101 MMOL/L (ref 98–107)
CREAT SERPL-MCNC: 1.38 MG/DL (ref 0.67–1.17)
EGFRCR SERPLBLD CKD-EPI 2021: 52 ML/MIN/1.73M2
ERYTHROCYTE [DISTWIDTH] IN BLOOD BY AUTOMATED COUNT: 17.2 % (ref 10–15)
GLUCOSE BLDC GLUCOMTR-MCNC: 124 MG/DL (ref 70–99)
GLUCOSE BLDC GLUCOMTR-MCNC: 126 MG/DL (ref 70–99)
GLUCOSE BLDC GLUCOMTR-MCNC: 144 MG/DL (ref 70–99)
GLUCOSE BLDC GLUCOMTR-MCNC: 151 MG/DL (ref 70–99)
GLUCOSE BLDC GLUCOMTR-MCNC: 160 MG/DL (ref 70–99)
GLUCOSE BLDC GLUCOMTR-MCNC: 171 MG/DL (ref 70–99)
GLUCOSE BLDC GLUCOMTR-MCNC: 183 MG/DL (ref 70–99)
GLUCOSE SERPL-MCNC: 140 MG/DL (ref 70–99)
HCO3 SERPL-SCNC: 28 MMOL/L (ref 22–29)
HCT VFR BLD AUTO: 31.6 % (ref 40–53)
HGB BLD-MCNC: 9.5 G/DL (ref 13.3–17.7)
HOLD SPECIMEN: NORMAL
INR PPP: 1.73 (ref 0.85–1.15)
MAGNESIUM SERPL-MCNC: 1.9 MG/DL (ref 1.7–2.3)
MCH RBC QN AUTO: 27.9 PG (ref 26.5–33)
MCHC RBC AUTO-ENTMCNC: 30.1 G/DL (ref 31.5–36.5)
MCV RBC AUTO: 93 FL (ref 78–100)
PHOSPHATE SERPL-MCNC: 4.1 MG/DL (ref 2.5–4.5)
PLATELET # BLD AUTO: 369 10E3/UL (ref 150–450)
POTASSIUM SERPL-SCNC: 3.1 MMOL/L (ref 3.4–5.3)
POTASSIUM SERPL-SCNC: 3.5 MMOL/L (ref 3.4–5.3)
RBC # BLD AUTO: 3.41 10E6/UL (ref 4.4–5.9)
SODIUM SERPL-SCNC: 142 MMOL/L (ref 135–145)
TROPONIN T SERPL HS-MCNC: 56 NG/L
TROPONIN T SERPL HS-MCNC: 61 NG/L
UFH PPP CHRO-ACNC: 0.16 IU/ML
UFH PPP CHRO-ACNC: 0.24 IU/ML
UFH PPP CHRO-ACNC: <0.1 IU/ML
WBC # BLD AUTO: 9.1 10E3/UL (ref 4–11)

## 2024-11-15 PROCEDURE — 250N000013 HC RX MED GY IP 250 OP 250 PS 637: Performed by: HOSPITALIST

## 2024-11-15 PROCEDURE — 85520 HEPARIN ASSAY: CPT | Performed by: GENERAL ACUTE CARE HOSPITAL

## 2024-11-15 PROCEDURE — 84100 ASSAY OF PHOSPHORUS: CPT | Performed by: STUDENT IN AN ORGANIZED HEALTH CARE EDUCATION/TRAINING PROGRAM

## 2024-11-15 PROCEDURE — A9500 TC99M SESTAMIBI: HCPCS

## 2024-11-15 PROCEDURE — A9552 F18 FDG: HCPCS

## 2024-11-15 PROCEDURE — 99233 SBSQ HOSP IP/OBS HIGH 50: CPT | Performed by: INTERNAL MEDICINE

## 2024-11-15 PROCEDURE — 99222 1ST HOSP IP/OBS MODERATE 55: CPT | Performed by: INTERNAL MEDICINE

## 2024-11-15 PROCEDURE — 85520 HEPARIN ASSAY: CPT | Performed by: INTERNAL MEDICINE

## 2024-11-15 PROCEDURE — 343N000001 HC RX 343 MED OP 636

## 2024-11-15 PROCEDURE — 120N000013 HC R&B IMCU

## 2024-11-15 PROCEDURE — 85520 HEPARIN ASSAY: CPT | Performed by: STUDENT IN AN ORGANIZED HEALTH CARE EDUCATION/TRAINING PROGRAM

## 2024-11-15 PROCEDURE — 250N000013 HC RX MED GY IP 250 OP 250 PS 637: Performed by: INTERNAL MEDICINE

## 2024-11-15 PROCEDURE — 84484 ASSAY OF TROPONIN QUANT: CPT | Performed by: STUDENT IN AN ORGANIZED HEALTH CARE EDUCATION/TRAINING PROGRAM

## 2024-11-15 PROCEDURE — 82565 ASSAY OF CREATININE: CPT | Performed by: STUDENT IN AN ORGANIZED HEALTH CARE EDUCATION/TRAINING PROGRAM

## 2024-11-15 PROCEDURE — 83735 ASSAY OF MAGNESIUM: CPT | Performed by: STUDENT IN AN ORGANIZED HEALTH CARE EDUCATION/TRAINING PROGRAM

## 2024-11-15 PROCEDURE — 78429 MYOCRD IMG PET 1 STD W/CT: CPT

## 2024-11-15 PROCEDURE — 250N000013 HC RX MED GY IP 250 OP 250 PS 637: Performed by: STUDENT IN AN ORGANIZED HEALTH CARE EDUCATION/TRAINING PROGRAM

## 2024-11-15 PROCEDURE — 250N000011 HC RX IP 250 OP 636: Performed by: HOSPITALIST

## 2024-11-15 PROCEDURE — 99223 1ST HOSP IP/OBS HIGH 75: CPT | Mod: FS | Performed by: THORACIC SURGERY (CARDIOTHORACIC VASCULAR SURGERY)

## 2024-11-15 PROCEDURE — 250N000011 HC RX IP 250 OP 636: Performed by: GENERAL ACUTE CARE HOSPITAL

## 2024-11-15 PROCEDURE — 85610 PROTHROMBIN TIME: CPT | Performed by: STUDENT IN AN ORGANIZED HEALTH CARE EDUCATION/TRAINING PROGRAM

## 2024-11-15 PROCEDURE — 250N000013 HC RX MED GY IP 250 OP 250 PS 637: Performed by: PHYSICIAN ASSISTANT

## 2024-11-15 PROCEDURE — 80048 BASIC METABOLIC PNL TOTAL CA: CPT | Performed by: STUDENT IN AN ORGANIZED HEALTH CARE EDUCATION/TRAINING PROGRAM

## 2024-11-15 PROCEDURE — 84132 ASSAY OF SERUM POTASSIUM: CPT | Performed by: HOSPITALIST

## 2024-11-15 PROCEDURE — 36415 COLL VENOUS BLD VENIPUNCTURE: CPT | Performed by: STUDENT IN AN ORGANIZED HEALTH CARE EDUCATION/TRAINING PROGRAM

## 2024-11-15 PROCEDURE — 36415 COLL VENOUS BLD VENIPUNCTURE: CPT | Performed by: INTERNAL MEDICINE

## 2024-11-15 PROCEDURE — 85027 COMPLETE CBC AUTOMATED: CPT | Performed by: STUDENT IN AN ORGANIZED HEALTH CARE EDUCATION/TRAINING PROGRAM

## 2024-11-15 PROCEDURE — 71250 CT THORAX DX C-: CPT

## 2024-11-15 RX ORDER — POTASSIUM CHLORIDE 1500 MG/1
20 TABLET, EXTENDED RELEASE ORAL ONCE
Status: COMPLETED | OUTPATIENT
Start: 2024-11-15 | End: 2024-11-15

## 2024-11-15 RX ORDER — NALOXONE HYDROCHLORIDE 0.4 MG/ML
0.4 INJECTION, SOLUTION INTRAMUSCULAR; INTRAVENOUS; SUBCUTANEOUS
Status: DISCONTINUED | OUTPATIENT
Start: 2024-11-15 | End: 2024-11-18

## 2024-11-15 RX ORDER — NALOXONE HYDROCHLORIDE 0.4 MG/ML
0.2 INJECTION, SOLUTION INTRAMUSCULAR; INTRAVENOUS; SUBCUTANEOUS
Status: DISCONTINUED | OUTPATIENT
Start: 2024-11-15 | End: 2024-11-18

## 2024-11-15 RX ORDER — POTASSIUM CHLORIDE 1500 MG/1
40 TABLET, EXTENDED RELEASE ORAL ONCE
Status: COMPLETED | OUTPATIENT
Start: 2024-11-15 | End: 2024-11-15

## 2024-11-15 RX ORDER — FLUDEOXYGLUCOSE F 18 200 MCI/ML
10-20 INJECTION, SOLUTION INTRAVENOUS ONCE
Status: COMPLETED | OUTPATIENT
Start: 2024-11-15 | End: 2024-11-15

## 2024-11-15 RX ORDER — MAGNESIUM SULFATE HEPTAHYDRATE 40 MG/ML
2 INJECTION, SOLUTION INTRAVENOUS ONCE
Status: COMPLETED | OUTPATIENT
Start: 2024-11-15 | End: 2024-11-15

## 2024-11-15 RX ADMIN — FINASTERIDE 5 MG: 5 TABLET, FILM COATED ORAL at 13:15

## 2024-11-15 RX ADMIN — FLUDEOXYGLUCOSE F 18 11.44 MILLICURIE: 200 INJECTION, SOLUTION INTRAVENOUS at 12:12

## 2024-11-15 RX ADMIN — POTASSIUM CHLORIDE 20 MEQ: 1500 TABLET, EXTENDED RELEASE ORAL at 17:23

## 2024-11-15 RX ADMIN — Medication 5 MG: at 00:50

## 2024-11-15 RX ADMIN — ASPIRIN 81 MG: 81 TABLET, COATED ORAL at 13:14

## 2024-11-15 RX ADMIN — FERROUS GLUCONATE 324 MG: 324 TABLET ORAL at 13:14

## 2024-11-15 RX ADMIN — Medication 32.6 MILLICURIE: at 08:13

## 2024-11-15 RX ADMIN — URSODIOL 300 MG: 300 CAPSULE ORAL at 20:10

## 2024-11-15 RX ADMIN — INSULIN GLARGINE 10 UNITS: 100 INJECTION, SOLUTION SUBCUTANEOUS at 13:15

## 2024-11-15 RX ADMIN — COLCHICINE 0.6 MG: 0.6 TABLET, FILM COATED ORAL at 13:15

## 2024-11-15 RX ADMIN — INSULIN GLARGINE 10 UNITS: 100 INJECTION, SOLUTION SUBCUTANEOUS at 20:17

## 2024-11-15 RX ADMIN — ATORVASTATIN CALCIUM 40 MG: 40 TABLET, FILM COATED ORAL at 20:09

## 2024-11-15 RX ADMIN — COLCHICINE 0.6 MG: 0.6 TABLET, FILM COATED ORAL at 20:10

## 2024-11-15 RX ADMIN — POTASSIUM CHLORIDE 40 MEQ: 1500 TABLET, EXTENDED RELEASE ORAL at 04:31

## 2024-11-15 RX ADMIN — GABAPENTIN 300 MG: 300 CAPSULE ORAL at 13:14

## 2024-11-15 RX ADMIN — ALLOPURINOL 100 MG: 100 TABLET ORAL at 13:14

## 2024-11-15 RX ADMIN — GABAPENTIN 300 MG: 300 CAPSULE ORAL at 20:09

## 2024-11-15 RX ADMIN — MAGNESIUM SULFATE HEPTAHYDRATE 2 G: 40 INJECTION, SOLUTION INTRAVENOUS at 04:26

## 2024-11-15 RX ADMIN — TORSEMIDE 5 MG: 5 TABLET ORAL at 13:15

## 2024-11-15 RX ADMIN — HEPARIN SODIUM 1400 UNITS/HR: 10000 INJECTION, SOLUTION INTRAVENOUS at 13:10

## 2024-11-15 RX ADMIN — URSODIOL 300 MG: 300 CAPSULE ORAL at 13:15

## 2024-11-15 NOTE — PLAN OF CARE
[facility] - ICU    RN Progress Note:            Pertinent Assessments:      Please refer to flowsheet rows for full assessment     Arrived on unit about 1500. Alertx4, lung sounds clear. Ate 100% dinner. NPO at 2000, except for water. Pt on 2 L nasal cannula, with spo2 94%+.    Telemetry reads Normal Sinus Rhythm, BBB, First Degree AV Block, ST elevation (V1).             Key Events - This Shift:       1815 TR Band removed  Plan to have PET Cardiac Viability Study tomorrow, 11/15, at 0800.                Barriers to Discharge / Downgrade:     Cardiac workup.         Point of Contact Update: YES-OR-NO: Yes  If No, reason:   Name:  Phone Number:  Summary of Conversation: Family updated in room.          Problem: Cardiac Catheterization (Diagnostic/Interventional)  Goal: Absence of Bleeding  Outcome: Progressing  Goal: Absence of Contrast-Induced Injury  Outcome: Progressing  Goal: Stable Heart Rate and Rhythm  Outcome: Progressing  Goal: Absence of Embolism Signs and Symptoms  Outcome: Progressing  Intervention: Prevent or Manage Embolism  Recent Flowsheet Documentation  Taken 11/14/2024 1600 by Consuelo Muro, RN  VTE Prevention/Management: SCDs off (sequential compression devices)  Goal: Optimal Pain Control and Function  Outcome: Progressing  Goal: Absence of Vascular Access Complication  Outcome: Progressing  Intervention: Prevent and Manage Access Complications  Recent Flowsheet Documentation  Taken 11/14/2024 1600 by Consuelo Muro, RN  Activity Management: bedrest  Head of Bed (HOB) Positioning: HOB at 30 degrees

## 2024-11-15 NOTE — PLAN OF CARE
Goal Outcome Evaluation:      Plan of Care Reviewed With: sibling          Outcome Evaluation: Pt's MORENA Joshi anticipates pt's to return back to [facility],with home care if needed.

## 2024-11-15 NOTE — CONSULTS
Care Management Initial Consult    General Information  Assessment completed with: MORENA Arvizu  Type of CM/SW Visit: Initial Assessment    Primary Care Provider verified and updated as needed: Yes   Readmission within the last 30 days: no previous admission in last 30 days         Advance Care Planning: Advance Care Planning Reviewed: present on chart          Communication Assessment  Patient's communication style: spoken language (English or Bilingual)    Hearing Difficulty or Deaf: no   Wear Glasses or Blind: yes    Cognitive  Cognitive/Neuro/Behavioral: WDL                      Living Environment:   People in home: facility resident  [facility] in [place]  Current living Arrangements: assisted living  Name of Facility: [facility] ([facility]]   Able to return to prior arrangements: other (see comments) (Depending on therapy recs)       Family/Social Support:  Care provided by: self, other (see comments) ([facility] Staff)  Provides care for: no one, unable/limited ability to care for self  Marital Status:   Support system: Children, Sibling(s)          Description of Support System: Supportive         Current Resources:   Patient receiving home care services: Yes  Skilled Home Care Services:  (Was seeing [facility] therapy PT/OT)     Community Resources: None  Equipment currently used at home: cane, straight, grab bar, tub/shower, raised toilet seat, walker, rolling, wheelchair, manual  Supplies currently used at home:      Employment/Financial:  Employment Status: retired        Financial Concerns:             Does the patient's insurance plan have a 3 day qualifying hospital stay waiver?  No    Lifestyle & Psychosocial Needs:  Social Drivers of Health     Food Insecurity: Low Risk  (11/15/2024)    Food Insecurity     Within the past 12 months, did you worry that your food would run out before you got money to buy more?: No     Within the past 12 months, did the food you bought just not last and you didn t  have money to get more?: No   Depression: Not at risk (2/27/2024)    PHQ-2     PHQ-2 Score: 0   Housing Stability: Low Risk  (11/15/2024)    Housing Stability     Do you have housing? : Yes     Are you worried about losing your housing?: No   Tobacco Use: Medium Risk (9/10/2024)    Patient History     Smoking Tobacco Use: Former     Smokeless Tobacco Use: Never     Passive Exposure: Never   Financial Resource Strain: Low Risk  (11/15/2024)    Financial Resource Strain     Within the past 12 months, have you or your family members you live with been unable to get utilities (heat, electricity) when it was really needed?: No   Alcohol Use: Not on file   Transportation Needs: Low Risk  (11/15/2024)    Transportation Needs     Within the past 12 months, has lack of transportation kept you from medical appointments, getting your medicines, non-medical meetings or appointments, work, or from getting things that you need?: No   Physical Activity: Not on file   Interpersonal Safety: Low Risk  (11/14/2024)    Interpersonal Safety     Do you feel physically and emotionally safe where you currently live?: Yes     Within the past 12 months, have you been hit, slapped, kicked or otherwise physically hurt by someone?: No     Within the past 12 months, have you been humiliated or emotionally abused in other ways by your partner or ex-partner?: No   Stress: Not on file   Social Connections: Not on file   Health Literacy: Not on file       Functional Status:  Prior to admission patient needed assistance:   Dependent ADLs:: Ambulation-walker (Does get escorted down to meals)  Dependent IADLs:: Medication Management, Cooking, Shopping, Meal Preparation, Transportation, Laundry       Mental Health Status:  Mental Health Status: No Current Concerns       Chemical Dependency Status:  Chemical Dependency Status: No Current Concerns             Values/Beliefs:  Spiritual, Cultural Beliefs, Mosque Practices, Values that affect care:                  Discussed  Partnership in Safe Discharge Planning  document with patient/family: No    Additional Information:  Assessed. RNCM introduced self and Cm role to pt's MORENA Joshi over the phone. Pt was at CT/Pet scan this AM. She verified address/contacts. Pt has a primary PCP established. Pt has ACP docs, pt's  spouse was primary HCA, alternative HCA is pt's son Omari, ACP docs in chart.       Pt lives alone at [facility] in [place]. Pt Ind with ADLS, uses walker (has w/c if needs, but hasn't been using). Pt is dependent with some IADLs [facility] provides meals, laundry svcs, escorts to meals, and medication set up. Pt's son Omari transports pt if needed, helps with shopping, and appointments. Pt is retired. No home care svcs at this time. Had PT/OT through the [facility]. Pt's MORENA anticipates pt returns to [facility], with home care if needed. Howie Salcido to transport if safe to do so.         RNCM spoke to NISA Tan at [facility] #[phone], and updated her.       [facility] cannot accept pt back over the weekend, they do not have an RN over the weekend to do change in condition assessment.         Next Steps: CM will continue to monitor progression of care, review team recommendations and provide discharge planning assist as needed.       Shyla Rodriguez RN

## 2024-11-15 NOTE — CONSULTS
[facility]  Consult Note - Hospitalist Service  Date of Admission:  11/14/2024  Consult Requested by: Cardiology  Reason for Consult: Medical co-management    Assessment & Plan   Assessment:  Lance Ibrahim is a 79 year old male with a past medical history documented below presented to the ER with complaint of worsening shortness of breath associated with orthopnea and paroxysmal nocturnal dyspnea along with chest tightness and new lower extremity edema after being compliant to torsemide, EKG shows new ST elevation in 1 convex ST in V1 and V3 compared to prior EKG as per chart review, STEMI was called and subsequently. Patient was started on heparin drip, patient was eventually transferred to [facility] for the procedure and patient eventually went for coronary angiography, multivessel coronary artery disease was found, cardiothoracic surgery was consulted for possible CABG, medicine was consulted for medical comanagement postprocedure.    Problem list and plan:  History of coronary artery disease  Multivessel coronary artery disease  STEMI  Acute decompensated systolic CHF exacerbation secondary to above  Patient presented to the ER with complaint of worsening shortness of breath associated with orthopnea and paroxysmal nocturnal dyspnea along with chest tightness and new lower extremity edema after being compliant to torsemide  EKG shows new ST elevation in 1 convex ST in V1 and V3 compared to prior EKG as per chart review  Patient received nitroglycerin and aspirin  Cardiology paged, reviewed EKG who recommended stat bedside echocardiogram to see for wall motion abnormality, after reviewing echo showing ejection fraction in the 30s which was new compared to previous echo in August and new wall motion abnormalities  STEMI was called and subsequently interventional cardiologist was paged, cardiology recommending nitroglycerin drip, patient was started on heparin drip  Patient was  eventually transferred to [facility] for the procedure and patient eventually went for coronary angiography  Multivessel coronary artery disease was found  Cardiothoracic surgery was consulted for possible CABG  Medicine was consulted for medical comanagement postprocedure.  [place]NP elevated at [address]  Troponin was 45, repeat ordered  Echocardiogram showed The visual ejection fraction is 25-30% with severe global hypokinesis although the septum, anterior and inferior walls look nearly akinetic.. Suspect mild to moderate decrease in RV systolic function. There is mild (1+) mitral regurgitation. Moderate aortic valve calcification is present without stenosis. There is mild (1+) aortic regurgitation. The right ventricular systolic pressure is approximated at 41mmHg.   Plan to have PET cardiac viability study tomorrow  Currently started on torsemide 5 mg daily  CT scan of the chest without contrast ordered for CABG workup  Monitor on telemetry monitoring  Continue with aspirin, started on Lipitor, follow-up lipid panel    Carotid vascular disease  Ultrasound of the carotid bilaterally showed Mild plaque formation, velocities consistent with less than 50% stenosis in the right internal carotid artery. Mild plaque formation, velocities consistent with less than 50% stenosis in the left internal carotid artery. Flow within the vertebral arteries is antegrade  Continue with medical management, outpatient vascular surgery follow-up may be needed postdischarge.    DEAN on CKD stage II  Baseline creatinine around 1.1  Current creatinine 1.45  Suspecting most likely cardiorenal etiology  Continue with diuresis  Monitor renal function closely  If renal function fails to resolve or worsen may need to consult nephrology    Hyperbilirubinemia most likely in the setting of hepatic congestion secondary to CHF exacerbation  Trend LFTs  Consider liver sonogram if deemed appropriate and if liver function worsening    Subclinical  hypothyroidism  T4 within normal limits, TSH minimally elevated at 4.28  May need outpatient repeat TSH and T4 done in 4 to 6 weeks postdischarge    History of insulin-dependent type 2 diabetes mellitus with hyperglycemia  Neuropathy  Follow-up hemoglobin A1c  Continue with home insulin regimen  Will be holding home oral hypoglycemics  Monitor blood sugar level  Hypoglycemia protocol  Continue sliding scale  Continue with gabapentin    Normocytic anemia  Monitor CBC, consider iron panel    History of gout  Continue with Zyloprim, colchicine    History of BPH  Continue with finasteride    History of hypertension  Holding lisinopril because of DEAN  Monitor vital signs as per protocol  IV hydralazine as needed for elevated blood pressure    Coagulopathy with elevated INR  INR elevated above 2  Monitor INR    Mood disorder  Holding nortriptyline for now    Miscellaneous meds  Continue with sodium bicarbonate, ursodiol    DVT PPx  Intermittent pneumatic compression    CODE STATUS  Full code as per discussion with the patient     Clinically Significant Risk Factors Present on Admission               # Hypoalbuminemia: Lowest albumin = 3 g/dL at 11/14/2024 10:23 AM, will monitor as appropriate  # Coagulation Defect: INR = 2.04 (Ref range: 0.85 - 1.15) and/or PTT = >240 Seconds (Ref range: 22 - 38 Seconds), will monitor for bleeding  # Drug Induced Platelet Defect: home medication list includes an antiplatelet medication   # Hypertension: Noted on problem list  # Chronic heart failure with reduced ejection fraction: last echo with EF <40%     # Anemia: based on hgb <11      # DMII: A1C = N/A within past 6 months   # Overweight: Estimated body mass index is 27.21 kg/m  as calculated from the following:    Height as of this encounter: 1.829 m (6').    Weight as of this encounter: 91 kg (200 lb 9.6 oz).       # Financial/Environmental Concerns:           Saad J. Gondal, MD  Hospitalist Service  Securely message with Anne Fogarty  (more info)  Text page via [facility] Paging/Directory   ______________________________________________________________________    Chief Complaint   Worsening shortness of breath and chest tightness, orthopnea    History is obtained from the patient    History of Present Illness   Lance Ibrahim is a 79 year old male with a past medical history documented below presented to the ER with complaint of worsening shortness of breath associated with orthopnea and paroxysmal nocturnal dyspnea along with chest tightness and new lower extremity edema after being compliant to torsemide, EKG shows new ST elevation in 1 convex ST in V1 and V3 compared to prior EKG as per chart review, patient received nitroglycerin and aspirin, cardiology paged, reviewed EKG who recommended stat bedside echocardiogram to see for wall motion abnormality, after reviewing echo showing ejection fraction in the 30s which was new compared to previous echo in August and new wall motion abnormalities, STEMI was called and subsequently interventional cardiologist was paged, cardiology recommending nitroglycerin drip, patient was started on heparin drip, patient was eventually transferred to [facility] for the procedure and patient eventually went for coronary angiography, multivessel coronary artery disease was found, cardiothoracic surgery was consulted for possible CABG, medicine was consulted for medical comanagement postprocedure.      Past Medical History    Past Medical History:   Diagnosis Date    Anemia     Arthritis     osteoarthritis knees    BPH     CAD (coronary artery disease)     subtotal occlusion in the small distal LAD     Cardiomyopathy     Cerebral artery occlusion with cerebral infarction     TIA 1993, no residual    Cervicalgia     CHF (congestive heart failure) (H)     Chronic kidney disease     Chronic low back pain     Chronic rhinitis     Gouty arthropathy     hands    Hyperlipidemia     Hypertension     Kidney stone      Nocturia     Obesity     Osteomyelitis (H) 08/2023    Foot    PVD (peripheral vascular disease)     Sleep apnea     doesn't use cpap    Spinal cord stimulator status 04/2024    Spinal cord stimulator in place    TIA (transient ischaemic attack) 1993    Type 2 diabetes mellitus - 11/08/2018    Ureteral stone        Past Surgical History   Past Surgical History:   Procedure Laterality Date    AMPUTATE FOOT Right 08/07/2023    Procedure: AMPUTATION, right hallux;  Surgeon: Nakul Salazar DPM;  Location: [facility] OR    AMPUTATE TOE(S) Left 10/15/2018    Procedure: AMPUTATE TOE(S);  Left third toe amputation;  Surgeon: Ayaka Azevedo DPM, Podiatry/Foot and Ankle Surgery;  Location:  OR    ARTHROPLASTY KNEE Right 12/07/2018    Procedure: Right total knee arthroplasty;  Surgeon: Issa Cunha MD;  Location:  OR    COLONOSCOPY  03/09/2013    Procedure: COLONOSCOPY;  COLONOSCOPY;  Surgeon: Chau Hogan MD;  Location:  GI    CV HEART CATHETERIZATION WITH POSSIBLE INTERVENTION Left 03/05/2019    Procedure: Coronary Angiogram;  Surgeon: Nas Linda MD;  Location: [facility] CARDIAC CATH LAB    Diastasis recti repair  1985    ENDOSCOPIC RETROGRADE CHOLANGIOPANCREATOGRAM N/A 04/30/2024    Procedure: ENDOSCOPIC RETROGRADE CHOLANGIOPANCREATOGRAPHY, BILIARY SPHINCTEROTOMY, DILATION, BRUSHING, BIOPSIES with DISTAL EXTRA HEPATIC BILE DUCT STRICTURE;  Surgeon: Aldo Gongora MD;  Location: [facility] OR    ESOPHAGOSCOPY, GASTROSCOPY, DUODENOSCOPY (EGD), COMBINED N/A 04/30/2024    Procedure: ESOPHAGOGASTRODUODENOSCOPY, WITH ENDOSCOPIC ULTRASOUND GUIDANCE;  Surgeon: Aldo Gongora MD;  Location: [facility] OR    EXTRACAPSULAR CATARACT EXTRATION WITH INTRAOCULAR LENS IMPLANT Left 03/13/2017    EXTRACAPSULAR CATARACT EXTRATION WITH INTRAOCULAR LENS IMPLANT Right     FOOT SURGERY  04/2013    cyst removal     HERNIA REPAIR  07/13/2004    ventral     IR DISC ASPIRATION INJECTION  6/19/2024     IRRIGATION AND DEBRIDEMENT SHOULDER, COMBINED Right 8/7/2024    Procedure: IRRIGATION AND DEBRIDEMENT, SHOULDER;  Surgeon: Terence Hanson MD;  Location: [facility] OR    IRRIGATION AND DEBRIDEMENT UPPER EXTREMITY, COMBINED Right 8/7/2024    Procedure: IRRIGATION AND DEBRIDEMENT, ELBOW AND THUMB;  Surgeon: Terence Hanson MD;  Location: [facility] OR    ORIF Shoulder Left     PROSTATE SURGERY  02/2024    Urolift    RESECT CLAVICLE DISTAL Right 8/7/2024    Procedure: EXCISION, CLAVICLE, DISTAL;  Surgeon: Terence Hanson MD;  Location: [facility] OR    SPINAL CORD STIMULATOR IMPLANT  04/03/2024    Ventral hernia repair NOS  1987       Medications   I have reviewed this patient's current medications       Review of Systems    The 10 point Review of Systems is negative other than noted in the HPI or here. Worsening shortness of breath and chest tightness, orthopnea    Physical Exam   Vital Signs: Temp: 98.6  F (37  C) Temp src: Oral BP: 122/72 Pulse: 91   Resp: (!) 35 SpO2: 93 % O2 Device: Nasal cannula Oxygen Delivery: 2 LPM  Weight: 200 lbs 9.6 oz    GENERAL: Patient was seen and examined at bedside with no acute distress  HENT:  Head is normocephalic, atraumatic.   EYES:  Eyes have anicteric sclerae without conjunctival injection   LUNGS: Bilateral air entry, clear to auscultation bilaterally  CARDIOVASCULAR:  Regular rate and rhythm with no murmurs, gallops or rubs.  ABDOMEN:  Normal bowel sounds. Soft; nontender   EXT: no edema    SKIN:  No acute rashes.   NEUROLOGIC:  Grossly nonfocal.      Medical Decision Making       80 MINUTES SPENT BY ME on the date of service doing chart review, history, exam, documentation & further activities per the note.      Data     I have personally reviewed the following data over the past 24 hrs:    10.6  \   10.3 (L)   / 387     137 98 23.0 /  175 (H)   3.5 25 1.45 (H) \     ALT: 33 AST: 38 AP: 393 (H) TBILI: 1.6 (H)   ALB: 3.0 (L)  TOT PROTEIN: 7.1 LIPASE: 40     Trop: 45 (H) BNP: 19,613 (H)     TSH: 4.28 (H) T4: 1.43 A1C: N/A     INR:  2.04 (H) PTT:  >240 (HH)   D-dimer:  N/A Fibrinogen:  N/A       Imaging results reviewed over the past 24 hrs:   Recent Results (from the past 24 hours)   XR Chest Port 1 View    Narrative    EXAM: XR CHEST PORT 1 VIEW  LOCATION: [facility]  DATE: 2024    INDICATION: SOB  COMPARISON: AP and lateral views of the chest 2024      Impression    IMPRESSION:     Moderate aortic atheromatous calcifications. Unchanged mild enlargement of the cardiac silhouette.    Incomplete inflation of the lower lobes with partial silhouetting of the diaphragmatic interfaces related to adjacent atelectasis. More focal atelectasis present in the medial bases, right greater than left. The lungs at and above the anjel are clear. No   peribronchial fluid cuffs. No visible pleural fluid.    Spinal stimulator terminates around T8. There are diffuse thoracic spine degenerative osteophytes.   Echocardiogram Complete   Result Value    LVEF  25-30%    Narrative    [id number]  [id number]  [id number]  [id number]^IBLL^MAYRA^[place] [address]     Name: CURTIS SAHU  MRN: [id number]  : 1945  Study Date: 2024 11:01 AM  Age: 79 yrs  Gender: Male  Patient [facility]  Reason For Study: Abn EKG  Ordering Physician: MAYRA KHAN  Performed By: ZAK     BSA: 2.1 m2  Height: 72 in  Weight: 192 lb  HR: 96  ______________________________________________________________________________  Procedure  Complete Echo Adult. Definity (NDC #46361-220) given intravenously.  ______________________________________________________________________________  Interpretation Summary     The visual ejection fraction is 25-30% with severe global hypokinesis although  the septum, anterior and inferior walls look nearly akinetic..  Suspect mild to moderate decrease in RV systolic  function.  There is mild (1+) mitral regurgitation.  Moderate aortic valve calcification is present without stenosis. There is mild  (1+) aortic regurgitation.  The right ventricular systolic pressure is approximated at 41mmHg.  Compared to the prior study dated 8/12/2024, there are changes as noted. There  has been a severe decline in LV systolic function.  ______________________________________________________________________________  Left Ventricle  The left ventricle is normal in size. There is normal left ventricular wall  thickness. The visual ejection fraction is 25-30%. There is severe global  hypokinesia of the left ventricle. There is no thrombus seen in the left  ventricle.     Right Ventricle  The right ventricle is normal size. Suspect mild to moderate decrease in RV  systolic function.     Atria  The left atrium is mildly dilated. Right atrial size is normal. Intact atrial  septum.     Mitral Valve  There is mild mitral annular calcification. There is mild (1+) mitral  regurgitation.     Tricuspid Valve  Tricuspid valve leaflets appear normal. There is mild (1+) tricuspid  regurgitation. The right ventricular systolic pressure is approximated at  26mmHg plus the right atrial pressure.     Aortic Valve  Moderate aortic valve calcification is present. There is mild (1+) aortic  regurgitation. No hemodynamically significant valvular aortic stenosis.     Pulmonic Valve  Normal pulmonic valve. There is trace pulmonic valvular regurgitation.     Vessels  The aorta root cannot be assessed. IVC diameter >2.1 cm collapsing <50% with  sniff suggests a high RA pressure estimated at 15 mmHg or greater.     Pericardium  There is no pericardial effusion.     ______________________________________________________________________________  MMode/2D Measurements & Calculations  IVSd: 0.95 cm  LVIDd: 5.0 cm  LVIDs: 4.3 cm  LVPWd: 1.1 cm     FS: 13.2 %  LV mass(C)d: 183.4 grams  LV mass(C)dI: 87.6 grams/m2  LA dimension:  4.2 cm  LVOT diam: 2.2 cm  LVOT area: 3.8 cm2  RWT: 0.42     Doppler Measurements & Calculations  MV E max edward: 97.7 cm/sec  MV A max edward: 57.0 cm/sec  MV E/A: 1.7  MV max PG: 3.9 mmHg  MV mean P.4 mmHg  MV V2 VTI: 27.4 cm  MVA(VTI): 1.9 cm2  MV dec slope: 822.1 cm/sec2  MV dec time: 0.12 sec  Ao V2 max: 163.9 cm/sec  Ao max P.0 mmHg  Ao V2 mean: 125.4 cm/sec  Ao mean P.8 mmHg  Ao V2 VTI: 26.9 cm  SHILO(I,D): 1.9 cm2  SHILO(V,D): 2.2 cm2  LV V1 max PG: 3.5 mmHg  LV V1 max: 93.0 cm/sec  LV V1 VTI: 13.3 cm  SV(LVOT): 50.8 ml  SI(LVOT): 24.2 ml/m2  PA acc time: 0.10 sec  AV Edward Ratio (DI): 0.57  SHILO Index (cm2/m2): 0.90     ______________________________________________________________________________  Report approved by: Lisbet Orourke 2024 12:08 PM         Cardiac Catheterization    Addendum: 2024        Ost LM to Mid LM lesion is 80% stenosed.    Prox RCA lesion is 100% stenosed.    IVUS was performed on the lesion.     Severe eccentric calcified proximal to mid left main stenosis.  Minimal cross-sectional area by IVUS equals 3.22 mm .  Distal left main reference segment equals 18.3 mm   Codominant circumflex system.  Mild to moderate luminal irregularity but no severe stenoses in LAD or left circumflex.  New  RCA small to medium caliber vessel with total proximal occlusion and bridging collaterals filling distal vessel.  Some competitive flow from left-sided collaterals noted as well.   LVEDP = 27 mmHg  No LV-Ao gradient      Narrative      Ost LM to Mid LM lesion is 80% stenosed.    Prox RCA lesion is 100% stenosed.    IVUS was performed on the lesion.     Severe eccentric calcified proximal to mid left main stenosis.  Minimal   cross-sectional area by IVUS equals 3.22 mm .  Distal left main reference   segment equals 18.3 mm   Codominant circumflex system.  Mild to moderate luminal irregularity but   no severe stenoses in LAD or left circumflex.  New  RCA small to medium caliber vessel  with total proximal occlusion and   bridging collaterals filling distal vessel.  Some competitive flow from   left-sided collaterals noted as well.   LVEDP = 27 mmHg  No LV-Ao gradient     US Carotid Bilateral    Narrative    EXAM: US CAROTID BILATERAL  LOCATION: [facility]  DATE: 11/14/2024    INDICATION: CABG w u  COMPARISON: None.  TECHNIQUE: Duplex exam performed utilizing 2D gray-scale imaging, Doppler interrogation with color-flow and spectral waveform analysis. The percent diameter stenosis is determined using Updated Recommendations for Carotid Stenosis Interpretation Criteria   from [employer].    FINDINGS:    RIGHT: Mild plaque at the bifurcation. The peak systolic velocity in the ICA is less than 180 cm/sec, consistent with less than 50% stenosis. Normal velocities in the ECA. Antegrade flow within the vertebral artery.     LEFT: Mild plaque at the bifurcation. The peak systolic velocity in the ICA is less than 180 cm/sec, consistent with less than 50% stenosis. Normal velocities in the ECA. Antegrade flow within the vertebral artery.    VELOCITY CHART:  CCA   Right: 53/8 cm/s   Left: 41/7 cm/s  ICA   Right: 72/23 cm/s   Left: 55/17 cm/s  ECA   Right: 58 cm/s   Left: 49 cm/s  ICA/CCA PSV Ratio   Right: 1.4   Left: 1.3      Impression    IMPRESSION:  1.  Mild plaque formation, velocities consistent with less than 50% stenosis in the right internal carotid artery.  2.  Mild plaque formation, velocities consistent with less than 50% stenosis in the left internal carotid artery.  3.  Flow within the vertebral arteries is antegrade.

## 2024-11-15 NOTE — PROGRESS NOTES
[facility]    Medicine Progress Note - Hospitalist Service    Date of Admission:  11/14/2024    Assessment & Plan   Lance Ibrahim is a 79 year old male with a past medical history of known coronary artery disease with moderate stenosis involving the distal left main, which was not hemodynamically significant in March 2019, with mild to moderate disease scattered in the remainder of the coronary vasculature, nonischemic cardiomyopathy diagnosed in 2019 thought possibly related to hypertension with subsequent normalization of LV function, essential hypertension, history of CVA without residual deficit, CKD stage III who presented to  ER with complaint of worsening shortness of breath associated with orthopnea and paroxysmal nocturnal dyspnea, chest tightness, and new lower extremity edema.  Per Cardiology, his initial ECG showed subtle ST elevations in the anterior leads which looked slightly different from previous ECGs although he did have LVH by voltage criteria. A stat echocardiogram was obtained in  ED, which showed severe decline in LV function compared to his prior study in August. Based on that and his symptoms, STEMI activation per protocol was implemented and transferred to [facility] for coronary angiography.  Coronary angiogram performed, which showed ostial LM-mLM lesion 80% stenosed, pRCA 100% stenosis.  CV surgery consultation advised for CABG consideration.     #CAD  #Possible STEMI  -Coronary angiogram showed ostial LM-mLM lesion 80% stenosed, pRCA 100% stenosed, pLAD 30% stenosis, circumflex normal with a second posterior lateral 50% stenosis, and the right coronary to proximal total occlusion.   -Peak troponin 56  -CV surgery consulted for CABG consideration  -PET cardiac viability study performed with results pending  -ASA 81mg every day, Lipitor 40mg every day, Heparin gtt, Torsemide 5mg every day  -Tele  -Cardiology/CV surgery assistance appreciated    # HFrEF  w/ ADHF  -Presumed ischemic origin  -TTE showed LVEF 25-30%, septal/anterior/inferior walls akinetic, mild-mod RV systolic fxn, 1+MR, 1+AR, RVSP ~41mmHg +RAP  -Torsemide 5mg every day  -Lisinopril on hold due to DEAN  -Cardiology assistance appreciated    #DM2  -Poor control  -A1C 9.1%  -Lantus 10U BID  -Novolog 1U:20g CHO AC TID  -sliding scale insulin, glucose checks    #DEAN on possible CKD stage III  -Cr 1.45 -> 1.38  - In August 2024 creatinine normal    #Chronic normocytic anemia  -Hgb 9.4-10.6 at recent baseline  -no e/o ABLA  -CBC a.m.    #Hypokalemia  -check Mg  -monitor and replete per protocol    #Subclinical hypothyroidism  -TSH 4.28, normal FT4  -recommend repeat TFT 4-6 weeks after acute illness fully resolved    #Peripheral neuropathy  -Gabapentin 300mg TID    #HLD  -LDL 88, HDL 23    #BPH  -Finasteride    #Chronic gout  -Allopurinol 100mg every day  -Colchicine 0.6mg qd         Diet: Advance Diet as Tolerated: Regular Diet Adult; Moderate Consistent Carb (60 g CHO per Meal) Diet; 2 gm NA Diet    DVT Prophylaxis: Heparin gtt  Barnes Catheter: Not present  Lines: None     Cardiac Monitoring: ACTIVE order. Indication: Post- PCI/Angiogram (24 hours)  Code Status: Full Code      Clinically Significant Risk Factors        # Hypokalemia: Lowest K = 3.1 mmol/L in last 2 days, will replace as needed        # Hypoalbuminemia: Lowest albumin = 3 g/dL at 11/14/2024 10:23 AM, will monitor as appropriate  # Coagulation Defect: INR = 1.73 (Ref range: 0.85 - 1.15) and/or PTT = >240 Seconds (Ref range: 22 - 38 Seconds), will monitor for bleeding    # Hypertension: Noted on problem list  # Chronic heart failure with reduced ejection fraction: last echo with EF <40%          # DMII: A1C = 9.1 % (Ref range: <5.7 %) within past 6 months   # Overweight: Estimated body mass index is 27.21 kg/m  as calculated from the following:    Height as of this encounter: 1.829 m (6').    Weight as of this encounter: 91 kg (200 lb 9.6  oz)., PRESENT ON ADMISSION     # Financial/Environmental Concerns:           Social Drivers of Health    Tobacco Use: Medium Risk (9/10/2024)    Patient History     Smoking Tobacco Use: Former     Smokeless Tobacco Use: Never     Passive Exposure: Never          Disposition Plan     Medically Ready for Discharge: Anticipated in 5+ Days             Lance Haynes DO, DO  Hospitalist Service  [facility]  Securely message with [employer] (more info)  Text page via [employer] Paging/Directory   ______________________________________________________________________    Interval History   Afebrile. No acute events overnight.    Physical Exam   Vital Signs: Temp: 98.4  F (36.9  C) Temp src: Oral BP: (!) 145/84 Pulse: 92   Resp: 20 SpO2: 95 % O2 Device: Nasal cannula Oxygen Delivery: 3 LPM  Weight: 200 lbs 9.6 oz    General appearance - alert, and in no distress  Eyes - sclera anicteric  Lungs - clear to auscultation, no wheezes, rales or rhonchi, symmetric air entry  Heart - rrr. No peripheral edema.  Abdomen - soft, nontender, nondistended, BS+  Neurological - alert, oriented x 3, non-focal  Skin - right radial artery access site slight bruising, neurovasc fxn intact to right hand    Lab/imaging reviewed

## 2024-11-15 NOTE — CONSULTS
CARDIOLOGY CONSULT NOTE         Assessment:   1.  Coronary artery disease-angiography last evening showed left main 80% mid stenosis, proximal LAD 30% stenosis, circumflex normal with a second posterior lateral 50% stenosis, and the right coronary to proximal total occlusion.  Given cardiomyopathy plan is for viability study today and depend on results of that potential revascularization surgically.  2.  NSTEMI (non-ST elevated myocardial infarction) (H)-mild troponin elevation, level went from 45 up to 56 up to 61.  Doing well on heparin.  Viability study as above.  3.  Cardiomyopathy-ejection fraction 25 to 30%, presumed ischemic, viability study today.     Plan:   1.  Plan viability study today.  2.  If we do the me or Dr Lakia Hwang.  3.  Depending on viability, we will look at revascularization surgically.     Current History:   [facility] has been requested by Dr. Gondal to evaluate Lance Ibrahim  who is a 79 year old year old white male for coronary disease and positive troponin.  Patient was in his usual state of health which included 7-8 years mild bipedal edema, till about 24 hours before presentation when all of a sudden he developed some shortness of breath at rest while watching TV.  There is no PND, orthopnea but there might be some mild chest tightness.  Symptoms did not improve with inhaler and continued over 24 hours at which point in time he was brought to [facility].  Troponin was elevated and was brought urgently to [facility] for an ST segment elevation MI.  This showed three-vessel disease and cardiology consultation was requested.  He lives in assisted living, denies any significant chest discomfort, palpitations, significant syncope, presyncope, PND or orthopnea.    Past Medical History:     Past Medical History:   Diagnosis Date    Anemia     Arthritis     osteoarthritis knees    BPH     CAD (coronary artery disease)     subtotal occlusion in the small distal LAD      Cardiomyopathy     Cerebral artery occlusion with cerebral infarction     TIA 1993, no residual    Cervicalgia     CHF (congestive heart failure) (H)     Chronic kidney disease     Chronic low back pain     Chronic rhinitis     Gouty arthropathy     hands    Hyperlipidemia     Hypertension     Kidney stone     Nocturia     Obesity     Osteomyelitis (H) 08/2023    Foot    PVD (peripheral vascular disease)     Sleep apnea     doesn't use cpap    Spinal cord stimulator status 04/2024    Spinal cord stimulator in place    TIA (transient ischaemic attack) 1993    Type 2 diabetes mellitus - 11/08/2018    Ureteral stone       Past history is negative for cancer, tuberculosis, myocardial infarction,  rheumatic fever,  cerebrovascular accident, peptic ulcer disease, chronic obstructive pulmonary disease, or thyroid disorder.    Past Surgical History:     Past Surgical History:   Procedure Laterality Date    AMPUTATE FOOT Right 08/07/2023    Procedure: AMPUTATION, right hallux;  Surgeon: Nakul Salazar DPM;  Location: [facility] Main OR    AMPUTATE TOE(S) Left 10/15/2018    Procedure: AMPUTATE TOE(S);  Left third toe amputation;  Surgeon: Ayaka Azevedo DPM, Podiatry/Foot and Ankle Surgery;  Location:  OR    ARTHROPLASTY KNEE Right 12/07/2018    Procedure: Right total knee arthroplasty;  Surgeon: Issa Cunha MD;  Location:  OR    COLONOSCOPY  03/09/2013    Procedure: COLONOSCOPY;  COLONOSCOPY;  Surgeon: Chau Hogan MD;  Location:  GI    CV HEART CATHETERIZATION WITH POSSIBLE INTERVENTION Left 03/05/2019    Procedure: Coronary Angiogram;  Surgeon: Nas Linda MD;  Location: [facility] CARDIAC CATH LAB    Diastasis recti repair  1985    ENDOSCOPIC RETROGRADE CHOLANGIOPANCREATOGRAM N/A 04/30/2024    Procedure: ENDOSCOPIC RETROGRADE CHOLANGIOPANCREATOGRAPHY, BILIARY SPHINCTEROTOMY, DILATION, BRUSHING, BIOPSIES with DISTAL EXTRA HEPATIC BILE DUCT STRICTURE;  Surgeon: Aldo Gongora MD;  Location:  [facility] OR    ESOPHAGOSCOPY, GASTROSCOPY, DUODENOSCOPY (EGD), COMBINED N/A 04/30/2024    Procedure: ESOPHAGOGASTRODUODENOSCOPY, WITH ENDOSCOPIC ULTRASOUND GUIDANCE;  Surgeon: Aldo Gongora MD;  Location: [facility] OR    EXTRACAPSULAR CATARACT EXTRATION WITH INTRAOCULAR LENS IMPLANT Left 03/13/2017    EXTRACAPSULAR CATARACT EXTRATION WITH INTRAOCULAR LENS IMPLANT Right     FOOT SURGERY  04/2013    cyst removal     HERNIA REPAIR  07/13/2004    ventral     IR DISC ASPIRATION INJECTION  6/19/2024    IRRIGATION AND DEBRIDEMENT SHOULDER, COMBINED Right 8/7/2024    Procedure: IRRIGATION AND DEBRIDEMENT, SHOULDER;  Surgeon: Terence Hanson MD;  Location: [facility] OR    IRRIGATION AND DEBRIDEMENT UPPER EXTREMITY, COMBINED Right 8/7/2024    Procedure: IRRIGATION AND DEBRIDEMENT, ELBOW AND THUMB;  Surgeon: Terence Hanson MD;  Location: [facility] OR    ORIF Shoulder Left     PROSTATE SURGERY  02/2024    Urolift    RESECT CLAVICLE DISTAL Right 8/7/2024    Procedure: EXCISION, CLAVICLE, DISTAL;  Surgeon: Terence Hanson MD;  Location: [facility] OR    SPINAL CORD STIMULATOR IMPLANT  04/03/2024    Ventral hernia repair NOS  1987     Family History:   Reviewed, and negative for coronary artery disease.    Social History:   Reviewed, and he  reports that he quit smoking about 31 years ago. His smoking use included cigarettes, pack a day for about 35 years. He has never been exposed to tobacco smoke. He has never used smokeless tobacco. He reports that he does not currently use alcohol, although in the past would drink possibly 2-4 1/2 a bottle of wine a week but no alcohol for the last 3 years. He reports that he does not use drugs.  He is retired from finance desk job, is , lives currently in assisted living, and primary care provider is Dr. Rickey Adamson.    Meds:     Current Facility-Administered Medications   Medication Dose Route Frequency Provider Last  Rate Last Admin    allopurinol (ZYLOPRIM) tablet 100 mg  100 mg Oral Daily Tammie Matta PA-C        aspirin EC tablet 81 mg  81 mg Oral Daily Tammie Matta PA-C        atorvastatin (LIPITOR) tablet 40 mg  40 mg Oral QPM Gondal, Saad J, MD   40 mg at 11/14/24 2106    colchicine (COLCRYS) tablet 0.6 mg  0.6 mg Oral BID Tammie Matta PA-C        ferrous gluconate (FERGON) tablet 324 mg  324 mg Oral Daily Tammie Matta PA-C        finasteride (PROSCAR) tablet 5 mg  5 mg Oral Daily Tammie Matta PA-C        gabapentin (NEURONTIN) capsule 300 mg  300 mg Oral TID Tammie Matta PA-C        insulin aspart (NovoLOG) injection (RAPID ACTING)   Subcutaneous TID w/meals Lance Haynes DO        insulin aspart (NovoLOG) injection (RAPID ACTING)  1-7 Units Subcutaneous TID AC Gondal, Saad J, MD        insulin glargine (LANTUS PEN) injection 10 Units  10 Units Subcutaneous BID Gondal, Saad J, MD   10 Units at 11/14/24 2104    [Held by provider] lisinopril (ZESTRIL) tablet 10 mg  10 mg Oral Q24H Tammie Matta PA-C        polyethylene glycol-propylene glycol (SYSTANE ULTRA) ophthalmic solution 1 drop  1 drop Both Eyes Daily Gondal, Saad J, MD        [Held by provider] sodium bicarbonate tablet 1,950 mg  1,950 mg Oral TID Tammie Matta PA-C   1,950 mg at 11/14/24 2107    torsemide (DEMADEX) tablet 5 mg  5 mg Oral Daily Tammie Matta PA-C        ursodiol (ACTIGALL) capsule 300 mg  300 mg Oral BID Tammie Matta PA-C   300 mg at 11/14/24 2106     Allergies:   Ancef [cefazolin], Cephalexin, Penicillins, and Sulfa antibiotics    Review of Systems:   A 12 point comprehensive review of systems was  as follows:   General: negative for fatigue, weight loss or weight gain  Constitutional: negative for anorexia, chills, fevers, malaise, night sweats   Eyes: negative for cataracts, color blindness, contacts/glasses, glaucoma, icterus, irritation, redness and visual disturbance  Ears, nose, mouth, throat, and  face: negative for ear drainage, earaches, epistaxis, facial trauma, hearing loss, hoarseness, nasal congestion, snoring, sore mouth, sore throat, tinnitus and voice change  Respiratory: Positive dyspnea on exertion, negative hemoptysis, sputum production, pleurisy, stridor, wheezing, PND, orthopnea  Cardiovascular: Positive for chest pain, chest pressure/discomfort, negative claudication, dyspnea, exertional chest pressure/discomfort, fatigue, irregular heart beat, lower extremity edema, near-syncope,  palpitations,  syncope   Gastrointestinal: negative for abdominal pain, change in bowel haibits, constipation, diarrhea, dyspepsia, dysphagia, jaundice, melena, nausea, odynophagia, reflux symptoms and vomiting  Genitourinary: negative for decreased stream  Hematologic/lymphatic: negative for bleeding  Musculoskeletal: negative for arthralgias, back pain, bone pain, muscle weakness, myalgias, neck pain and stiff joints  Neurological: negative for syncope, presyncope, coordination problems, dizziness, gait problems, headaches, memory problems, paresthesia, seizures, speech problems, tremors, vertigo and weakness    Objective:     Physical Exam:  /75   Pulse 91   Temp 98  F (36.7  C) (Oral)   Resp 28   Ht 1.829 m (6')   Wt 91 kg (200 lb 9.6 oz)   SpO2 95%   BMI 27.21 kg/m       Head:    Normocephalic, without obvious abnormality, atraumatic   Eyes:    PERRL, conjunctiva/corneas clear, EOM's intact,           Nose:   Nares normal, septum midline, mucosa normal, no drainage  or sinus tenderness   Throat:   Lips, mucosa, and tongue normal; teeth and gums normal   Neck:   Supple, symmetrical, trachea midline, no adenopathy;        thyroid:  No enlargement/tenderness/nodules; no carotid    bruit or JVD   Back:     Symmetric, no curvature, ROM normal, no CVA tenderness   Lungs:     Clear to auscultation bilaterally, respirations unlabored   Chest wall:    No tenderness or deformity   Heart:    Regular rate and  "rhythm, S1 and S2 normal, no murmur, rub   or gallop   Abdomen:     Soft, non-tender, bowel sounds active all four quadrants,     no masses, no organomegaly   Extremities:   Extremities normal, atraumatic, no cyanosis or edema   Pulses:   2+ and symmetric all extremities   Skin:   Skin color, texture, turgor normal, no rashes or lesions   Neurologic:   CNII-XII intact. Normal strength, sensation and reflexes       throughout     Cardiographics and Imaging  Radiology Results: Chest x-ray shows Moderate aortic atheromatous calcifications. Unchanged mild enlargement of the cardiac silhouette.   Incomplete inflation of the lower lobes with partial silhouetting of the diaphragmatic interfaces related to adjacent atelectasis. More focal atelectasis present in the medial bases, right greater than left. The lungs at and above the anjel are clear. No peribronchial fluid cuffs. No visible pleural fluid.   Spinal stimulator terminates around T8. There are diffuse thoracic spine degenerative osteophytes.    EKG Results: personally reviewed sinus rhythm, occasional PVC and PVC, leftward axis, possible old anterior septal Q wave MI type pattern.      Lab Review   Pertinent Labs  Lab Results: personally reviewed       No results found for: \"CKTOTAL\", \"CKMB\", \"TROPONINI\"    Lab Results   Component Value Date    BUN 20.9 11/15/2024     11/15/2024    CO2 28 11/15/2024       Lab Results   Component Value Date    WBC 9.1 11/15/2024    HGB 9.5 (L) 11/15/2024    HCT 31.6 (L) 11/15/2024    MCV 93 11/15/2024     11/15/2024       No results found for: \"BNP\"    Clinically Significant Risk Factors Present on Admission        # Hypokalemia: Lowest K = 3.1 mmol/L in last 2 days, will replace as needed        # Hypoalbuminemia: Lowest albumin = 3 g/dL at 11/14/2024 10:23 AM, will monitor as appropriate  # Coagulation Defect: INR = 1.73 (Ref range: 0.85 - 1.15) and/or PTT = >240 Seconds (Ref range: 22 - 38 Seconds), will monitor for " bleeding  # Drug Induced Platelet Defect: home medication list includes an antiplatelet medication   # Hypertension: Noted on problem list  # Chronic heart failure with reduced ejection fraction: last echo with EF <40%    # Acute Hypoxic Respiratory Failure: Documented O2 saturation < 90%. Continue supplemental oxygen as needed   # Anemia: based on hgb <11      # DMII: A1C = 9.1 % (Ref range: <5.7 %) within past 6 months   # Overweight: Estimated body mass index is 27.21 kg/m  as calculated from the following:    Height as of this encounter: 1.829 m (6').    Weight as of this encounter: 91 kg (200 lb 9.6 oz).       # Financial/Environmental Concerns:          Cardiomyopathy      CKD POA List: Stage 3a (GFR 45-59)

## 2024-11-15 NOTE — PLAN OF CARE
Goal Outcome Evaluation:      Plan of Care Reviewed With: patient    Overall Patient Progress: no changeOverall Patient Progress: no change    Outcome Evaluation: Vitally stable. Waiting on CT and PET scan. R radial site a little bruised but no hematoma. Heparin drip restarted. SOB when head of bed lower. O2 at 3 Lpm

## 2024-11-16 LAB
ALBUMIN SERPL BCG-MCNC: 2.6 G/DL (ref 3.5–5.2)
ALBUMIN UR-MCNC: 70 MG/DL
ALP SERPL-CCNC: 367 U/L (ref 40–150)
ALT SERPL W P-5'-P-CCNC: 28 U/L (ref 0–70)
ANION GAP SERPL CALCULATED.3IONS-SCNC: 11 MMOL/L (ref 7–15)
APPEARANCE UR: CLEAR
AST SERPL W P-5'-P-CCNC: 31 U/L (ref 0–45)
ATRIAL RATE - MUSE: 92 BPM
BASOPHILS # BLD AUTO: 0 10E3/UL (ref 0–0.2)
BASOPHILS NFR BLD AUTO: 1 %
BILIRUB SERPL-MCNC: 1.2 MG/DL
BILIRUB UR QL STRIP: NEGATIVE
BUN SERPL-MCNC: 22.1 MG/DL (ref 8–23)
CALCIUM SERPL-MCNC: 8.5 MG/DL (ref 8.8–10.4)
CHLORIDE SERPL-SCNC: 103 MMOL/L (ref 98–107)
COLOR UR AUTO: YELLOW
CREAT SERPL-MCNC: 1.3 MG/DL (ref 0.67–1.17)
DIASTOLIC BLOOD PRESSURE - MUSE: NORMAL MMHG
EGFRCR SERPLBLD CKD-EPI 2021: 56 ML/MIN/1.73M2
EOSINOPHIL # BLD AUTO: 0.3 10E3/UL (ref 0–0.7)
EOSINOPHIL NFR BLD AUTO: 3 %
ERYTHROCYTE [DISTWIDTH] IN BLOOD BY AUTOMATED COUNT: 17.2 % (ref 10–15)
GLUCOSE BLDC GLUCOMTR-MCNC: 107 MG/DL (ref 70–99)
GLUCOSE BLDC GLUCOMTR-MCNC: 134 MG/DL (ref 70–99)
GLUCOSE BLDC GLUCOMTR-MCNC: 135 MG/DL (ref 70–99)
GLUCOSE BLDC GLUCOMTR-MCNC: 168 MG/DL (ref 70–99)
GLUCOSE SERPL-MCNC: 130 MG/DL (ref 70–99)
GLUCOSE UR STRIP-MCNC: NEGATIVE MG/DL
HCO3 SERPL-SCNC: 25 MMOL/L (ref 22–29)
HCT VFR BLD AUTO: 32.6 % (ref 40–53)
HGB BLD-MCNC: 9.6 G/DL (ref 13.3–17.7)
HGB UR QL STRIP: NEGATIVE
IMM GRANULOCYTES # BLD: 0.1 10E3/UL
IMM GRANULOCYTES NFR BLD: 1 %
INTERPRETATION ECG - MUSE: NORMAL
KETONES UR STRIP-MCNC: NEGATIVE MG/DL
LEUKOCYTE ESTERASE UR QL STRIP: NEGATIVE
LYMPHOCYTES # BLD AUTO: 2.3 10E3/UL (ref 0.8–5.3)
LYMPHOCYTES NFR BLD AUTO: 29 %
MAGNESIUM SERPL-MCNC: 2.3 MG/DL (ref 1.7–2.3)
MCH RBC QN AUTO: 27.4 PG (ref 26.5–33)
MCHC RBC AUTO-ENTMCNC: 29.4 G/DL (ref 31.5–36.5)
MCV RBC AUTO: 93 FL (ref 78–100)
MONOCYTES # BLD AUTO: 0.5 10E3/UL (ref 0–1.3)
MONOCYTES NFR BLD AUTO: 6 %
NEUTROPHILS # BLD AUTO: 4.8 10E3/UL (ref 1.6–8.3)
NEUTROPHILS NFR BLD AUTO: 60 %
NITRATE UR QL: NEGATIVE
NRBC # BLD AUTO: 0 10E3/UL
NRBC BLD AUTO-RTO: 0 /100
P AXIS - MUSE: 34 DEGREES
PH UR STRIP: 6.5 [PH] (ref 5–7)
PLATELET # BLD AUTO: 370 10E3/UL (ref 150–450)
POTASSIUM SERPL-SCNC: 3.7 MMOL/L (ref 3.4–5.3)
PR INTERVAL - MUSE: 174 MS
PROT SERPL-MCNC: 6.7 G/DL (ref 6.4–8.3)
QRS DURATION - MUSE: 108 MS
QT - MUSE: 412 MS
QTC - MUSE: 509 MS
R AXIS - MUSE: -31 DEGREES
RBC # BLD AUTO: 3.5 10E6/UL (ref 4.4–5.9)
RBC URINE: <1 /HPF
SODIUM SERPL-SCNC: 139 MMOL/L (ref 135–145)
SP GR UR STRIP: 1.02 (ref 1–1.03)
SYSTOLIC BLOOD PRESSURE - MUSE: NORMAL MMHG
T AXIS - MUSE: 94 DEGREES
TROPONIN T SERPL HS-MCNC: 49 NG/L
TROPONIN T SERPL HS-MCNC: 50 NG/L
UFH PPP CHRO-ACNC: 0.23 IU/ML
UFH PPP CHRO-ACNC: 0.29 IU/ML
UFH PPP CHRO-ACNC: 0.33 IU/ML
UROBILINOGEN UR STRIP-MCNC: 2 MG/DL
VENTRICULAR RATE- MUSE: 92 BPM
WBC # BLD AUTO: 7.9 10E3/UL (ref 4–11)
WBC URINE: 1 /HPF

## 2024-11-16 PROCEDURE — 36415 COLL VENOUS BLD VENIPUNCTURE: CPT

## 2024-11-16 PROCEDURE — 250N000011 HC RX IP 250 OP 636

## 2024-11-16 PROCEDURE — 250N000011 HC RX IP 250 OP 636: Performed by: INTERNAL MEDICINE

## 2024-11-16 PROCEDURE — 36415 COLL VENOUS BLD VENIPUNCTURE: CPT | Performed by: GENERAL ACUTE CARE HOSPITAL

## 2024-11-16 PROCEDURE — C1894 INTRO/SHEATH, NON-LASER: HCPCS | Performed by: INTERNAL MEDICINE

## 2024-11-16 PROCEDURE — 93010 ELECTROCARDIOGRAM REPORT: CPT | Performed by: INTERNAL MEDICINE

## 2024-11-16 PROCEDURE — 250N000013 HC RX MED GY IP 250 OP 250 PS 637: Performed by: HOSPITALIST

## 2024-11-16 PROCEDURE — 36415 COLL VENOUS BLD VENIPUNCTURE: CPT | Performed by: INTERNAL MEDICINE

## 2024-11-16 PROCEDURE — 258N000003 HC RX IP 258 OP 636: Performed by: INTERNAL MEDICINE

## 2024-11-16 PROCEDURE — 33967 INSERT I-AORT PERCUT DEVICE: CPT | Performed by: INTERNAL MEDICINE

## 2024-11-16 PROCEDURE — 94799 UNLISTED PULMONARY SVC/PX: CPT

## 2024-11-16 PROCEDURE — 84484 ASSAY OF TROPONIN QUANT: CPT

## 2024-11-16 PROCEDURE — 250N000013 HC RX MED GY IP 250 OP 250 PS 637: Performed by: PHYSICIAN ASSISTANT

## 2024-11-16 PROCEDURE — 93005 ELECTROCARDIOGRAM TRACING: CPT

## 2024-11-16 PROCEDURE — 81001 URINALYSIS AUTO W/SCOPE: CPT

## 2024-11-16 PROCEDURE — 999N000156 HC STATISTIC RCP CONSULT EA 30 MIN

## 2024-11-16 PROCEDURE — 999N000157 HC STATISTIC RCP TIME EA 10 MIN

## 2024-11-16 PROCEDURE — 250N000013 HC RX MED GY IP 250 OP 250 PS 637: Performed by: INTERNAL MEDICINE

## 2024-11-16 PROCEDURE — 99233 SBSQ HOSP IP/OBS HIGH 50: CPT | Performed by: INTERNAL MEDICINE

## 2024-11-16 PROCEDURE — C1725 CATH, TRANSLUMIN NON-LASER: HCPCS | Performed by: INTERNAL MEDICINE

## 2024-11-16 PROCEDURE — 80053 COMPREHEN METABOLIC PANEL: CPT | Performed by: INTERNAL MEDICINE

## 2024-11-16 PROCEDURE — 250N000009 HC RX 250: Performed by: INTERNAL MEDICINE

## 2024-11-16 PROCEDURE — 99232 SBSQ HOSP IP/OBS MODERATE 35: CPT | Performed by: INTERNAL MEDICINE

## 2024-11-16 PROCEDURE — 85520 HEPARIN ASSAY: CPT | Performed by: GENERAL ACUTE CARE HOSPITAL

## 2024-11-16 PROCEDURE — 272N000001 HC OR GENERAL SUPPLY STERILE: Performed by: INTERNAL MEDICINE

## 2024-11-16 PROCEDURE — 272N000202 HC AEROBIKA WITH MANOMETER

## 2024-11-16 PROCEDURE — 5A02210 ASSISTANCE WITH CARDIAC OUTPUT USING BALLOON PUMP, CONTINUOUS: ICD-10-PCS | Performed by: INTERNAL MEDICINE

## 2024-11-16 PROCEDURE — 85520 HEPARIN ASSAY: CPT | Performed by: INTERNAL MEDICINE

## 2024-11-16 PROCEDURE — 83735 ASSAY OF MAGNESIUM: CPT | Performed by: HOSPITALIST

## 2024-11-16 PROCEDURE — 85025 COMPLETE CBC W/AUTO DIFF WBC: CPT | Performed by: INTERNAL MEDICINE

## 2024-11-16 PROCEDURE — 250N000013 HC RX MED GY IP 250 OP 250 PS 637: Performed by: SURGERY

## 2024-11-16 PROCEDURE — 250N000011 HC RX IP 250 OP 636: Performed by: GENERAL ACUTE CARE HOSPITAL

## 2024-11-16 PROCEDURE — 250N000013 HC RX MED GY IP 250 OP 250 PS 637: Performed by: STUDENT IN AN ORGANIZED HEALTH CARE EDUCATION/TRAINING PROGRAM

## 2024-11-16 PROCEDURE — 120N000013 HC R&B IMCU

## 2024-11-16 PROCEDURE — 82310 ASSAY OF CALCIUM: CPT | Performed by: INTERNAL MEDICINE

## 2024-11-16 RX ORDER — NITROGLYCERIN 0.4 MG/1
0.4 TABLET SUBLINGUAL EVERY 5 MIN PRN
Status: DISCONTINUED | OUTPATIENT
Start: 2024-11-16 | End: 2024-11-18

## 2024-11-16 RX ORDER — FUROSEMIDE 20 MG/1
20 TABLET ORAL ONCE
Status: COMPLETED | OUTPATIENT
Start: 2024-11-16 | End: 2024-11-16

## 2024-11-16 RX ORDER — PANTOPRAZOLE SODIUM 40 MG/1
40 TABLET, DELAYED RELEASE ORAL
Status: DISCONTINUED | OUTPATIENT
Start: 2024-11-16 | End: 2024-11-18

## 2024-11-16 RX ORDER — POTASSIUM CHLORIDE 1500 MG/1
20 TABLET, EXTENDED RELEASE ORAL ONCE
Status: COMPLETED | OUTPATIENT
Start: 2024-11-16 | End: 2024-11-16

## 2024-11-16 RX ORDER — NITROGLYCERIN 20 MG/100ML
10-200 INJECTION INTRAVENOUS CONTINUOUS
Status: DISCONTINUED | OUTPATIENT
Start: 2024-11-16 | End: 2024-11-18

## 2024-11-16 RX ORDER — MORPHINE SULFATE 2 MG/ML
2 INJECTION, SOLUTION INTRAMUSCULAR; INTRAVENOUS ONCE
Status: COMPLETED | OUTPATIENT
Start: 2024-11-16 | End: 2024-11-16

## 2024-11-16 RX ORDER — HEPARIN SODIUM 200 [USP'U]/100ML
INJECTION, SOLUTION INTRAVENOUS
Status: DISCONTINUED | OUTPATIENT
Start: 2024-11-16 | End: 2024-11-16 | Stop reason: HOSPADM

## 2024-11-16 RX ORDER — SODIUM CHLORIDE 9 MG/ML
INJECTION, SOLUTION INTRAVENOUS CONTINUOUS
Status: DISCONTINUED | OUTPATIENT
Start: 2024-11-16 | End: 2024-11-18

## 2024-11-16 RX ORDER — ISOSORBIDE DINITRATE 10 MG/1
10 TABLET ORAL
Status: DISCONTINUED | OUTPATIENT
Start: 2024-11-16 | End: 2024-11-16

## 2024-11-16 RX ORDER — ACETAMINOPHEN 325 MG/1
650 TABLET ORAL EVERY 4 HOURS PRN
Status: DISCONTINUED | OUTPATIENT
Start: 2024-11-16 | End: 2024-11-18

## 2024-11-16 RX ORDER — HEPARIN SODIUM 10000 [USP'U]/100ML
0-5000 INJECTION, SOLUTION INTRAVENOUS CONTINUOUS
Status: DISCONTINUED | OUTPATIENT
Start: 2024-11-16 | End: 2024-11-18

## 2024-11-16 RX ADMIN — FERROUS GLUCONATE 324 MG: 324 TABLET ORAL at 08:27

## 2024-11-16 RX ADMIN — NITROGLYCERIN 10 MCG/MIN: 20 INJECTION INTRAVENOUS at 08:59

## 2024-11-16 RX ADMIN — INSULIN GLARGINE 10 UNITS: 100 INJECTION, SOLUTION SUBCUTANEOUS at 20:18

## 2024-11-16 RX ADMIN — ATORVASTATIN CALCIUM 40 MG: 40 TABLET, FILM COATED ORAL at 20:18

## 2024-11-16 RX ADMIN — ASPIRIN 81 MG: 81 TABLET, COATED ORAL at 08:27

## 2024-11-16 RX ADMIN — GABAPENTIN 300 MG: 300 CAPSULE ORAL at 08:27

## 2024-11-16 RX ADMIN — GABAPENTIN 300 MG: 300 CAPSULE ORAL at 13:50

## 2024-11-16 RX ADMIN — NITROGLYCERIN 50 MCG/MIN: 20 INJECTION INTRAVENOUS at 19:08

## 2024-11-16 RX ADMIN — PANTOPRAZOLE SODIUM 40 MG: 40 TABLET, DELAYED RELEASE ORAL at 09:44

## 2024-11-16 RX ADMIN — NITROGLYCERIN 0.4 MG: 0.4 TABLET, ORALLY DISINTEGRATING SUBLINGUAL at 07:52

## 2024-11-16 RX ADMIN — HEPARIN SODIUM 1700 UNITS/HR: 10000 INJECTION, SOLUTION INTRAVENOUS at 01:56

## 2024-11-16 RX ADMIN — URSODIOL 300 MG: 300 CAPSULE ORAL at 20:18

## 2024-11-16 RX ADMIN — POLYPROPYLENE GLYCOL 400, PROPYLENE GLYCOL 1 DROP: .4; .3 LIQUID OPHTHALMIC at 08:36

## 2024-11-16 RX ADMIN — FUROSEMIDE 20 MG: 20 TABLET ORAL at 08:40

## 2024-11-16 RX ADMIN — ALLOPURINOL 100 MG: 100 TABLET ORAL at 08:26

## 2024-11-16 RX ADMIN — MORPHINE SULFATE 2 MG: 2 INJECTION, SOLUTION INTRAMUSCULAR; INTRAVENOUS at 11:57

## 2024-11-16 RX ADMIN — GABAPENTIN 300 MG: 300 CAPSULE ORAL at 20:17

## 2024-11-16 RX ADMIN — URSODIOL 300 MG: 300 CAPSULE ORAL at 08:27

## 2024-11-16 RX ADMIN — COLCHICINE 0.6 MG: 0.6 TABLET, FILM COATED ORAL at 08:28

## 2024-11-16 RX ADMIN — SODIUM CHLORIDE: 9 INJECTION, SOLUTION INTRAVENOUS at 16:00

## 2024-11-16 RX ADMIN — HEPARIN SODIUM 1700 UNITS/HR: 10000 INJECTION, SOLUTION INTRAVENOUS at 19:06

## 2024-11-16 RX ADMIN — POTASSIUM CHLORIDE 20 MEQ: 1500 TABLET, EXTENDED RELEASE ORAL at 08:26

## 2024-11-16 RX ADMIN — INSULIN GLARGINE 10 UNITS: 100 INJECTION, SOLUTION SUBCUTANEOUS at 08:34

## 2024-11-16 RX ADMIN — NITROGLYCERIN 0.4 MG: 0.4 TABLET, ORALLY DISINTEGRATING SUBLINGUAL at 07:58

## 2024-11-16 RX ADMIN — NITROGLYCERIN 0.4 MG: 0.4 TABLET, ORALLY DISINTEGRATING SUBLINGUAL at 06:05

## 2024-11-16 RX ADMIN — FINASTERIDE 5 MG: 5 TABLET, FILM COATED ORAL at 08:27

## 2024-11-16 RX ADMIN — COLCHICINE 0.6 MG: 0.6 TABLET, FILM COATED ORAL at 20:18

## 2024-11-16 RX ADMIN — TORSEMIDE 5 MG: 5 TABLET ORAL at 08:28

## 2024-11-16 NOTE — PLAN OF CARE
Problem: Adult Inpatient Plan of Care  Goal: Optimal Comfort and Wellbeing  Outcome: Progressing     Problem: Cardiac Catheterization (Diagnostic/Interventional)  Goal: Stable Heart Rate and Rhythm  Outcome: Progressing     Problem: Comorbidity Management  Goal: Blood Pressure in Desired Range  Outcome: Progressing     Problem: Cardiovascular Surgery  Goal: Optimal Coping with Heart Surgery  Outcome: Progressing     Problem: Chest Pain  Goal: Resolution of Chest Pain Symptoms  Outcome: Progressing     Pt A/Ox4. Pt noted to have multiple episodes of persistent chest tightness after initiation of nitroglycerin gtt. NED Barr and Dr. Espinoza notified via page at 1100 and to bedside. Stat EKG, troponin, and IV morphine orders noted. Nitroglycerin gtt titrated per orders to 110 mcg/min, intervention effective with pt reporting 0/10 chest discomfort. Heparin gtt at 1700u/hr per orders. Plan for IABP per NED Barr. Pt left unit to cath lab at 1430 via stretcher. Pt to transfer to .

## 2024-11-16 NOTE — PROGRESS NOTES
[facility]  [phone]          Assessment/Recommendations   Patient with severe left main stenosis, anginal symptoms, reduced left ventricular systolic function but viability by PET scanning.  Tentatively scheduled for bypass surgery early next week.    2 episodes of chest discomfort relieved with nitroglycerin since the early morning hours.  He is not currently on any antianginals.    Will add low-dose IV nitroglycerin and and because of some slight crackles at the bases 1 dose of oral Lasix.  Would have a low threshold to add low-dose beta-blocker as well, but will hold given reduced left ventricular systolic function and some element of congestive heart failure.    If recurrent discomfort, would have a low threshold for intravenous nitroglycerin and letting cardiovascular surgery know as his surgery will need to be bumped up if symptoms are not controlled.  Discussed with surgical team.    Will continue to follow.       Subjective   Patient reported chest discomfort this morning on 2 occasions.  First occasion was relieved with 1 nitroglycerin, second occasion took 2 nitroglycerin and now he feels fine.  He denies any current chest discomfort or shortness of breath.    ECG/Tele: Personally reviewed.  Sinus rhythm on monitor.       Physical Examination Review of Systems   /71   Pulse 89   Temp 97.5  F (36.4  C) (Oral)   Resp 15   Ht 1.829 m (6')   Wt 90.2 kg (198 lb 12.8 oz)   SpO2 94%   BMI 26.96 kg/m    Body mass index is 26.96 kg/m .  Wt Readings from Last 3 Encounters:   11/16/24 90.2 kg (198 lb 12.8 oz)   11/14/24 87.1 kg (192 lb)   09/10/24 88 kg (194 lb)     General Appearance:   Alert, cooperative and in no acute distress.   ENT/Mouth: Pink/moist     EYES:  no scleral icterus, normal conjunctivae   Neck: JVP normal. No Hepatojugular reflux. Thyroid not visualized.   Chest/Lungs:   Lungs have slight crackles at the bases.  Equal chest wall expansion.   Cardiovascular:    S1, S2 with 1/6 systolic murmur , no clicks or rubs.    Abdomen:     Extremities:    Skin: no xanthelasma, warm.    Neurologic: normal arm movement bilateral, no tremors     Psychiatric: Appropriate affect.      Encounter Vitals  BP: 123/71  Pulse: 89  Resp: 15  Temp: 97.5  F (36.4  C)  Temp src: Oral  SpO2: 94 %  Weight: 90.2 kg (198 lb 12.8 oz)  Height: 182.9 cm (6')                                           Medical History  Surgical History Family History Social History   Past Medical History:   Diagnosis Date    Anemia     Arthritis     osteoarthritis knees    BPH     CAD (coronary artery disease)     subtotal occlusion in the small distal LAD     Cardiomyopathy     Cerebral artery occlusion with cerebral infarction     TIA 1993, no residual    Cervicalgia     CHF (congestive heart failure) (H)     Chronic kidney disease     Chronic low back pain     Chronic rhinitis     Gouty arthropathy     hands    Hyperlipidemia     Hypertension     Kidney stone     Nocturia     Obesity     Osteomyelitis (H) 08/2023    Foot    PVD (peripheral vascular disease)     Sleep apnea     doesn't use cpap    Spinal cord stimulator status 04/2024    Spinal cord stimulator in place    TIA (transient ischaemic attack) 1993    Type 2 diabetes mellitus - 11/08/2018    Ureteral stone     Past Surgical History:   Procedure Laterality Date    AMPUTATE FOOT Right 08/07/2023    Procedure: AMPUTATION, right hallux;  Surgeon: Nakul Salazar DPM;  Location: [facility] Main OR    AMPUTATE TOE(S) Left 10/15/2018    Procedure: AMPUTATE TOE(S);  Left third toe amputation;  Surgeon: Ayaka Azevedo DPM, Podiatry/Foot and Ankle Surgery;  Location:  OR    ARTHROPLASTY KNEE Right 12/07/2018    Procedure: Right total knee arthroplasty;  Surgeon: Issa Cunha MD;  Location:  OR    COLONOSCOPY  03/09/2013    Procedure: COLONOSCOPY;  COLONOSCOPY;  Surgeon: Chau Hogan MD;  Location:  GI    CV CORONARY ANGIOGRAM N/A 11/14/2024     Procedure: Coronary Angiogram;  Surgeon: Talon Lew MD;  Location: [facility]    CV HEART CATHETERIZATION WITH POSSIBLE INTERVENTION Left 03/05/2019    Procedure: Coronary Angiogram;  Surgeon: Nsa Linda MD;  Location: [facility] CARDIAC CATH LAB    CV INTRAVASULAR ULTRASOUND N/A 11/14/2024    Procedure: Intravascular Ultrasound;  Surgeon: Talon Lew MD;  Location: [facility] CV    CV LEFT HEART CATH N/A 11/14/2024    Procedure: Left Heart Catheterization;  Surgeon: Talon Lew MD;  Location: [facility] CV    Diastasis recti repair  1985    ENDOSCOPIC RETROGRADE CHOLANGIOPANCREATOGRAM N/A 04/30/2024    Procedure: ENDOSCOPIC RETROGRADE CHOLANGIOPANCREATOGRAPHY, BILIARY SPHINCTEROTOMY, DILATION, BRUSHING, BIOPSIES with DISTAL EXTRA HEPATIC BILE DUCT STRICTURE;  Surgeon: Aldo Gongora MD;  [place] [facility] [place]    ESOPHAGOSCOPY, GASTROSCOPY, DUODENOSCOPY (EGD), COMBINED N/A 04/30/2024    Procedure: ESOPHAGOGASTRODUODENOSCOPY, WITH ENDOSCOPIC ULTRASOUND GUIDANCE;  Surgeon: Aldo Gongora MD;  Location: [facility] OR    EXTRACAPSULAR CATARACT EXTRATION WITH INTRAOCULAR LENS IMPLANT Left 03/13/2017    EXTRACAPSULAR CATARACT EXTRATION WITH INTRAOCULAR LENS IMPLANT Right     FOOT SURGERY  04/2013    cyst removal     HERNIA REPAIR  07/13/2004    ventral     IR DISC ASPIRATION INJECTION  6/19/2024    IRRIGATION AND DEBRIDEMENT SHOULDER, COMBINED Right 8/7/2024    Procedure: IRRIGATION AND DEBRIDEMENT, SHOULDER;  Surgeon: Terence Hanson MD;  Location: [facility] OR    IRRIGATION AND DEBRIDEMENT UPPER EXTREMITY, COMBINED Right 8/7/2024    Procedure: IRRIGATION AND DEBRIDEMENT, ELBOW AND THUMB;  Surgeon: Terence Hanson MD;  Location: [facility] OR    ORIF Shoulder Left     PROSTATE SURGERY  02/2024    Urolift    RESECT CLAVICLE DISTAL Right 8/7/2024    Procedure: EXCISION, CLAVICLE, DISTAL;  Surgeon: Terence Hanson,  MD;  Location: [facility] Main OR    SPINAL CORD STIMULATOR IMPLANT  2024    Ventral hernia repair NOS  1987    Family History   Problem Relation Age of Onset    Family History Negative Mother          88 yo    Cancer Father          74 yo brain    Diabetes Maternal Grandfather          89 yo    Alcohol/Drug Paternal Grandfather             Colon Cancer No family hx of     Social History     Socioeconomic History    Marital status:      Spouse name: Not on file    Number of children: Not on file    Years of education: Not on file    Highest education level: Not on file   Occupational History     Employer: SELF   Tobacco Use    Smoking status: Former     Current packs/day: 0.00     Types: Cigarettes     Quit date: 3/17/1993     Years since quittin.6     Passive exposure: Never    Smokeless tobacco: Never   Vaping Use    Vaping status: Never Used   Substance and Sexual Activity    Alcohol use: Not Currently    Drug use: No    Sexual activity: Never   Other Topics Concern    Parent/sibling w/ CABG, MI or angioplasty before 65F 55M? Not Asked   Social History Narrative    Not on file     Social Drivers of Health     Financial Resource Strain: Low Risk  (11/15/2024)    Financial Resource Strain     Within the past 12 months, have you or your family members you live with been unable to get utilities (heat, electricity) when it was really needed?: No   Food Insecurity: Low Risk  (11/15/2024)    Food Insecurity     Within the past 12 months, did you worry that your food would run out before you got money to buy more?: No     Within the past 12 months, did the food you bought just not last and you didn t have money to get more?: No   Transportation Needs: Low Risk  (11/15/2024)    Transportation Needs     Within the past 12 months, has lack of transportation kept you from medical appointments, getting your medicines, non-medical meetings or appointments, work, or from getting  "things that you need?: No   Physical Activity: Not on file   Stress: Not on file   Social Connections: Not on file   Interpersonal Safety: Low Risk  (11/14/2024)    Interpersonal Safety     Do you feel physically and emotionally safe where you currently live?: Yes     Within the past 12 months, have you been hit, slapped, kicked or otherwise physically hurt by someone?: No     Within the past 12 months, have you been humiliated or emotionally abused in other ways by your partner or ex-partner?: No   Housing Stability: Low Risk  (11/15/2024)    Housing Stability     Do you have housing? : Yes     Are you worried about losing your housing?: No          Medications  Allergies   No current outpatient medications on file.    Allergies   Allergen Reactions    Ancef [Cefazolin] Rash    Cephalexin Itching and Rash     Allergic reaction required hospitalization 4/2024    Penicillins Anaphylaxis    Sulfa Antibiotics      \"itchy rash, swelling of face and hands\"         Lab Results    Chemistry/lipid CBC Cardiac Enzymes/BNP/TSH/INR   Lab Results   Component Value Date    CHOL 127 11/14/2024    HDL 23 (L) 11/14/2024    TRIG 80 11/14/2024    BUN 22.1 11/16/2024     11/16/2024    CO2 25 11/16/2024    Lab Results   Component Value Date    WBC 7.9 11/16/2024    HGB 9.6 (L) 11/16/2024    HCT 32.6 (L) 11/16/2024    MCV 93 11/16/2024     11/16/2024    Lab Results   Component Value Date    TSH 4.28 (H) 11/14/2024    INR 1.73 (H) 11/15/2024                                             "

## 2024-11-16 NOTE — SIGNIFICANT EVENT
Significant Event Note    Time of event: November 16, 2024 1132    Description of event:  Patient is known to our team (see consult note) and currently scheduled for CABG on Tuesday w/ Dr. Evans. Called to bedside to evaluate patient's recurrent chest pain. Patient had episode of chest pain this AM which resolved w/ initiation of nitro gtt at 30 mcg/kg/min. He was chest pain free during my visit w/ him earlier this AM. Paged by RN that patient's chest discomfort was recurring. Cardiology asked us to evaluate whether his procedure should be moved up.    When I arrived at bedside, patient rated his chest pain as a 3/10, but appeared diaphoretic and was clutching his chest, visibly uncomfortable. He described his discomfort as a band tightening across his chest. The nitro gtt at this point was titrated up to 90 mcg/kg/min. Case was discussed w/ Dr. Hurt at this point. Stat hstrop and ecg were ordered. Morphine was ordered per cardiology for pain and anxiety. I discussed the option of IABP w/ the on call interventionalist who was in an ongoing case. Received word patient was now chest pain free after morphine w/ nitro gtt at 110 mcg/kg/min w/ stable vitals on 3L O2. hsTrop returned at 50. ECG pending.    Plan:  Case was discussed w/ both Dr. Hurt and Dr. Shahid. Plan is to take patient for IABP this PM. Plan to take patient to the OR on 11/18, but if recurrent chest pain on IABP support may need to go sooner.     Discussed with: bedside nurse, on-call interventionalist Dr. Candelaria, Dr. Hurt, Dr. Shahid, Dr. Espinoza, and Dr. Elvi Barr PA-C  [facility] Cardiothoracic Surgery  Vocera or Secure Chat  November 16, 2024

## 2024-11-16 NOTE — PROGRESS NOTES
[facility]    Medicine Progress Note - Hospitalist Service    Date of Admission:  11/14/2024    Assessment & Plan   Lance Ibrhaim is a 79 year old male with a past medical history of known coronary artery disease with moderate stenosis involving the distal left main, which was not hemodynamically significant in March 2019, with mild to moderate disease scattered in the remainder of the coronary vasculature, nonischemic cardiomyopathy diagnosed in 2019 thought possibly related to hypertension with subsequent normalization of LV function, essential hypertension, history of CVA without residual deficit, CKD stage III who presented to  ER with complaint of worsening shortness of breath associated with orthopnea and paroxysmal nocturnal dyspnea, chest tightness, and new lower extremity edema.  Per Cardiology, his initial ECG showed subtle ST elevations in the anterior leads which looked slightly different from previous ECGs although he did have LVH by voltage criteria. A stat echocardiogram was obtained in  ED, which showed severe decline in LV function compared to his prior study in August. Based on that and his symptoms, STEMI activation per protocol was implemented and transferred to [facility] for coronary angiography.  Coronary angiogram performed, which showed ostial LM-mLM lesion 80% stenosed, pRCA 100% stenosis.  CV surgery consultation advised for CABG consideration.     #CAD  #Possible STEMI  -Coronary angiogram showed ostial LM-mLM lesion 80% stenosed, pRCA 100% stenosed, pLAD 30% stenosis, circumflex normal with a second posterior lateral 50% stenosis, and the right coronary to proximal total occlusion.   -Peak troponin 56  -CV surgery consulted for CABG consideration  -PET cardiac viability study performed with results pending  -ASA 81mg every day, Lipitor 40mg every day, Torsemide 5mg every day  -Heparin gtt per protocol  -NTG gtt started 11/16  -Tele  -Cardiology/CV  surgery assistance appreciated    Addendum: due to recurrent CP on escalating dose of NTG gtt Cardiology/[facility] planning for AIBP placement today.    # HFrEF w/ ADHF  -Presumed ischemic origin  -TTE showed LVEF 25-30%, septal/anterior/inferior walls akinetic, mild-mod RV systolic fxn, 1+MR, 1+AR, RVSP ~41mmHg +RAP  -Torsemide 5mg every day  -Lisinopril on hold due to DEAN  -Cardiology assistance appreciated  -Strict I/O, daily weights    #DM2  -Poor control  -A1C 9.1%  -Lantus 10U BID  -Novolog 1U:20g CHO AC TID  -sliding scale insulin, glucose checks    #DEAN on possible CKD stage III  -Cr 1.45 -> 1.38 -> 1.3  - In August 2024 creatinine normal  - Hold PO Bicarb    #Chronic normocytic anemia  -Hgb 9.4-10.6 at recent baseline  -Current Hgb 9.6  -no e/o ABLA  -CBC a.m.    #Hypokalemia  -monitor and replete per protocol    #Subclinical hypothyroidism  -TSH 4.28, normal FT4  -recommend repeat TFT 4-6 weeks after acute illness fully resolved    #Peripheral neuropathy  -Gabapentin 300mg TID    #HLD  -LDL 88, HDL 23  -Lipitor 40mg qd    #BPH  -Finasteride    #Chronic gout  -Allopurinol 100mg every day  -Colchicine 0.6mg every day    #GERD  -PPI         Diet: Advance Diet as Tolerated: Regular Diet Adult; Moderate Consistent Carb (60 g CHO per Meal) Diet; 2 gm NA Diet    DVT Prophylaxis: Heparin gtt  Barnes Catheter: Not present  Lines: None     Cardiac Monitoring: ACTIVE order. Indication: Post- PCI/Angiogram (24 hours)  Code Status: Full Code            Diet: Advance Diet as Tolerated: Regular Diet Adult; Moderate Consistent Carb (60 g CHO per Meal) Diet; 2 gm NA Diet    DVT Prophylaxis: Heparin gtt  Barnes Catheter: Not present  Lines: None     Cardiac Monitoring: ACTIVE order. Indication: Chest pain/ ACS rule out (24 hours)  Code Status: Full Code      Clinically Significant Risk Factors        # Hypokalemia: Lowest K = 3.1 mmol/L in last 2 days, will replace as needed        # Hypoalbuminemia: Lowest albumin = 2.6 g/dL at  11/16/2024  4:06 AM, will monitor as appropriate  # Coagulation Defect: INR = 1.73 (Ref range: 0.85 - 1.15) and/or PTT = >240 Seconds (Ref range: 22 - 38 Seconds), will monitor for bleeding    # Hypertension: Noted on problem list  # Chronic heart failure with reduced ejection fraction: last echo with EF <40%          # DMII: A1C = 9.1 % (Ref range: <5.7 %) within past 6 months   # Overweight: Estimated body mass index is 26.96 kg/m  as calculated from the following:    Height as of this encounter: 1.829 m (6').    Weight as of this encounter: 90.2 kg (198 lb 12.8 oz)., PRESENT ON ADMISSION     # Financial/Environmental Concerns:           Social Drivers of Health    Tobacco Use: Medium Risk (9/10/2024)    Patient History     Smoking Tobacco Use: Former     Smokeless Tobacco Use: Never     Passive Exposure: Never          Disposition Plan     Medically Ready for Discharge: Anticipated in 5+ Days             Lance Haynes DO, DO  Hospitalist Service  [facility]  Securely message with SECU[employer] (more info)  Text page via [employer] Paging/Directory   ______________________________________________________________________    Interval History   Afebrile. Events overnight noted.    Physical Exam   Vital Signs: Temp: 97.6  F (36.4  C) Temp src: Oral BP: 130/73 Pulse: 95   Resp: 18 SpO2: 95 % O2 Device: Nasal cannula with humidification Oxygen Delivery: 1 LPM  Weight: 198 lbs 12.8 oz    General appearance - NAD  Lungs - Decreased bases, no wheezing, non labored  Heart - rrr. No peripheral edema.  Abdomen - soft, nontender, nondistended, BS+  Neurological - alert, oriented x 3, non-focal  Skin - no c/c/p     Lab/imaging reviewed

## 2024-11-16 NOTE — PLAN OF CARE
"Goal Outcome Evaluation:         Patient transferred from ICU shortly before 0500.      Patient alert and oriented.  Vital signs stable.  Heparin infusing at 1700 units/hr, antiXa within goal range.  Recheck antiXa at 1200.      Patient reported some chest constriction shortly before 0600.  CV surgery paged, prn SL nitroglycerin order obtained.  1 sl nitroglycerin was effective in relieving tightness.  Patient instructed to tell staff if tightness returns.    Right radial angiogram site covered with band aid, intact.  CMS unchanged.  Incentive spirometer brought into patient room.  Patient resting comfortably watching television.    Patient arrived on unit with chargers to spinal stimulator, clothes, wallet with no cash.  Patient declined to have any items locked up with security.      Problem: Adult Inpatient Plan of Care  Goal: Plan of Care Review  Description: The Plan of Care Review/Shift note should be completed every shift.  The Outcome Evaluation is a brief statement about your assessment that the patient is improving, declining, or no change.  This information will be displayed automatically on your shift  note.  Outcome: Progressing  Goal: Patient-Specific Goal (Individualized)  Description: You can add care plan individualizations to a care plan. Examples of Individualization might be:  \"Parent requests to be called daily at 9am for status\", \"I have a hard time hearing out of my right ear\", or \"Do not touch me to wake me up as it startles  me\".  Outcome: Progressing  Goal: Absence of Hospital-Acquired Illness or Injury  Outcome: Progressing  Intervention: Identify and Manage Fall Risk  Recent Flowsheet Documentation  Taken 11/16/2024 0515 by Chelsi Haq, RN  Safety Promotion/Fall Prevention: activity supervised  Intervention: Prevent Skin Injury  Recent Flowsheet Documentation  Taken 11/16/2024 0515 by Chelsi Haq, RN  Body Position: position changed independently  Goal: Optimal Comfort and " Wellbeing  Outcome: Progressing  Goal: Readiness for Transition of Care  Outcome: Progressing

## 2024-11-16 NOTE — PLAN OF CARE
[facility] - ICU    RN Progress Note:    Pt is alert, oriented x 4, and able to make needs known.. Vital signs stable. SaO2 maintained within ordered parameters with supplemental O2 via nasal cannula at 3 LPM. Pt continues heparin infusion, per Nurse managed low-intensity infusion protocol. Pt denies chest pain. HS blood glucose 183 mg/dL. Bellow the knee sequential compression devices in place, bilaterally. Will continue to  monitor. Angel Flores RN            Pertinent Assessments:      Please refer to flowsheet rows for full assessment     Vital signs.            Key Events - This Shift:       No overnight events.                 Barriers to Discharge / Downgrade:     Plan for cardiac surgical intervention with [facility], on Tuesday.

## 2024-11-16 NOTE — PLAN OF CARE
Room available on Unit P3. Report called to receiving RN. Pt transferred off of ICU. Medications, and belongings sent with Pt. Angel Flores RN

## 2024-11-16 NOTE — PROGRESS NOTES
Brief CV Surgery Note        Briefly, Lance Ibrahim is a 79 year old male w/ a PMH of CAD w/ known LM stenosis, CKD1, DM2, chronic anemia nonischemic cardiomyopathy (thought to be d/t HTN) w/ chronic HFpEF, h/o CVA (bilateral PCA emboli), and spinal stenosis w/ spinal cord stimulator in place and found to have NSTEMI w/ ostial to mid LM stenosis. CV surgery was consulted for consideration of surgical revascularization. We recommend CABG.    Patient seen and examined by our team today. Reports some chest pain this AM, started on nitro per cardiology. Chest pain resolved after nitro gtt was started.    Vitals currently stable on 3L NC. Labs today grossly stable. Remains on heparin gtt. Nitro gtt started per cardiology. PET viability w/ stunned but viable myocardium. Currently chest pain free.    - Plan for CABG w/ Dr. Evans on 11/19 to follow.  - Monitor closely, if chest pain recurs may need IABP or earlier surgical intervention.  - UA ordered, preop workup otherwise complete.   - Preop orders not yet placed.    - Remainder of cares per primary team.  - We will follow.  - Please feel free to reach out w/ questions or concerns.  - Patient and plan discussed w/ Dr. Hurt.       Ernestina Barr PA-C  [facility] Cardiothoracic Surgery  Pager: [phone]  November 16, 2024

## 2024-11-16 NOTE — PRE-PROCEDURE
GENERAL PRE-PROCEDURE:   Procedure:  Intra-aortic balloon pump  Date/Time:  11/16/2024 2:47 PM    Written consent obtained?: Yes    Risks and benefits: Risks, benefits and alternatives were discussed    Consent given by:  Patient  Patient states understanding of procedure being performed: Yes    Patient's understanding of procedure matches consent: Yes    Procedure consent matches procedure scheduled: Yes    Expected level of sedation:  Moderate  Appropriately NPO:  Yes  ASA Class:  4  Mallampati  :  Grade 1- soft palate, uvula, tonsillar pillars, and posterior pharyngeal wall visible  Lungs:  Lungs clear with good breath sounds bilaterally  Heart:  Normal heart sounds and rate  History & Physical reviewed:  History and physical reviewed and no updates needed  Statement of review:  I have reviewed the lab findings, diagnostic data, medications, and the plan for sedation

## 2024-11-16 NOTE — PROGRESS NOTES
"Imp/plan:  CAD left main in setting EF 25-30% and angina this AM now on high dose NTG and opiate for pain control but concern for worsening HF that will increase risks for CABG, will start IABP now - discussed with the pt the risks,benefits goals and alteranivees and CV surgeyr and there was agreement to proceed.      Review of Systems: 12 points negative other than above    /72   Pulse 90   Temp 97.6  F (36.4  C) (Oral)   Resp 18   Ht 1.829 m (6')   Wt 90.2 kg (198 lb 12.8 oz)   SpO2 95%   BMI 26.96 kg/m        Lab Results   Component Value Date    HGB 9.6 (L) 11/16/2024     Lab Results   Component Value Date     11/16/2024     No results found for: \"CREATININE\"  No components found for: \"K\"          Efraín Boles MD  Interventional Cardiology   [facility]  [phone]    "

## 2024-11-16 NOTE — PROVIDER NOTIFICATION
Dr. Espinoza notified via page at 0800 regarding pt reporting chest tightness 4/10. /78. Sublingual nitroglycerin given x2 per orders, intervention effective per pt with a pain rating of 0/10 after 2nd dose. MD to bedside. New orders noted for nitroglycerin gtt.

## 2024-11-16 NOTE — TREATMENT PLAN
/84   Pulse 95   Temp 97.5  F (36.4  C) (Oral)   Resp 15   Ht 1.829 m (6')   Wt 90.2 kg (198 lb 12.8 oz)   SpO2 96%   BMI 26.96 kg/m       Breath sounds clear and decreased. No accessory muscle use. No nasal flaring . No retractions. Pt able to use IS/Aerobika effectively. Pt on aO2 1 lpm. Breathing WNL.     Pt former smoker 1-2 ppd x 30 years. Pt does not use MDI. Pt does not sofia nebulizer. Pt does not use home cpap/bipap machine.     RCAT Treatment Plan    Patient Score: 3  Patient Acuity: 5    Clinical Indication for Therapy: atelectasis    Therapy Ordered: is/aerobika QID - pt able to do on own. Pt agreeable to using device on own QID.     Assessment Summary: see above    Jagjit Polanco, RT  11/16/2024

## 2024-11-17 ENCOUNTER — APPOINTMENT (OUTPATIENT)
Dept: RADIOLOGY | Facility: HOSPITAL | Age: 79
End: 2024-11-17
Attending: INTERNAL MEDICINE
Payer: COMMERCIAL

## 2024-11-17 LAB
ABO/RH(D): NORMAL
ANION GAP SERPL CALCULATED.3IONS-SCNC: 9 MMOL/L (ref 7–15)
ANTIBODY SCREEN: NEGATIVE
BUN SERPL-MCNC: 22.7 MG/DL (ref 8–23)
CALCIUM SERPL-MCNC: 8.3 MG/DL (ref 8.8–10.4)
CHLORIDE SERPL-SCNC: 102 MMOL/L (ref 98–107)
CREAT SERPL-MCNC: 1.47 MG/DL (ref 0.67–1.17)
EGFRCR SERPLBLD CKD-EPI 2021: 48 ML/MIN/1.73M2
ERYTHROCYTE [DISTWIDTH] IN BLOOD BY AUTOMATED COUNT: 17.2 % (ref 10–15)
GLUCOSE BLDC GLUCOMTR-MCNC: 103 MG/DL (ref 70–99)
GLUCOSE BLDC GLUCOMTR-MCNC: 108 MG/DL (ref 70–99)
GLUCOSE BLDC GLUCOMTR-MCNC: 131 MG/DL (ref 70–99)
GLUCOSE BLDC GLUCOMTR-MCNC: 235 MG/DL (ref 70–99)
GLUCOSE SERPL-MCNC: 104 MG/DL (ref 70–99)
HCO3 SERPL-SCNC: 25 MMOL/L (ref 22–29)
HCT VFR BLD AUTO: 31.6 % (ref 40–53)
HGB BLD-MCNC: 9.1 G/DL (ref 13.3–17.7)
HOLD SPECIMEN: NORMAL
MAGNESIUM SERPL-MCNC: 1.9 MG/DL (ref 1.7–2.3)
MAGNESIUM SERPL-MCNC: 2 MG/DL (ref 1.7–2.3)
MCH RBC QN AUTO: 27.1 PG (ref 26.5–33)
MCHC RBC AUTO-ENTMCNC: 28.8 G/DL (ref 31.5–36.5)
MCV RBC AUTO: 94 FL (ref 78–100)
PLATELET # BLD AUTO: 341 10E3/UL (ref 150–450)
POTASSIUM SERPL-SCNC: 3.7 MMOL/L (ref 3.4–5.3)
POTASSIUM SERPL-SCNC: 4.2 MMOL/L (ref 3.4–5.3)
RBC # BLD AUTO: 3.36 10E6/UL (ref 4.4–5.9)
SODIUM SERPL-SCNC: 136 MMOL/L (ref 135–145)
SPECIMEN EXPIRATION DATE: NORMAL
UFH PPP CHRO-ACNC: 0.35 IU/ML
UFH PPP CHRO-ACNC: 0.4 IU/ML
WBC # BLD AUTO: 7 10E3/UL (ref 4–11)

## 2024-11-17 PROCEDURE — 250N000011 HC RX IP 250 OP 636: Performed by: INTERNAL MEDICINE

## 2024-11-17 PROCEDURE — 86923 COMPATIBILITY TEST ELECTRIC: CPT | Performed by: THORACIC SURGERY (CARDIOTHORACIC VASCULAR SURGERY)

## 2024-11-17 PROCEDURE — 83735 ASSAY OF MAGNESIUM: CPT | Performed by: THORACIC SURGERY (CARDIOTHORACIC VASCULAR SURGERY)

## 2024-11-17 PROCEDURE — 85014 HEMATOCRIT: CPT | Performed by: INTERNAL MEDICINE

## 2024-11-17 PROCEDURE — 82565 ASSAY OF CREATININE: CPT | Performed by: INTERNAL MEDICINE

## 2024-11-17 PROCEDURE — 250N000013 HC RX MED GY IP 250 OP 250 PS 637: Performed by: HOSPITALIST

## 2024-11-17 PROCEDURE — 84132 ASSAY OF SERUM POTASSIUM: CPT | Performed by: THORACIC SURGERY (CARDIOTHORACIC VASCULAR SURGERY)

## 2024-11-17 PROCEDURE — 36415 COLL VENOUS BLD VENIPUNCTURE: CPT | Performed by: INTERNAL MEDICINE

## 2024-11-17 PROCEDURE — 71045 X-RAY EXAM CHEST 1 VIEW: CPT

## 2024-11-17 PROCEDURE — 86923 COMPATIBILITY TEST ELECTRIC: CPT | Performed by: STUDENT IN AN ORGANIZED HEALTH CARE EDUCATION/TRAINING PROGRAM

## 2024-11-17 PROCEDURE — 36415 COLL VENOUS BLD VENIPUNCTURE: CPT | Performed by: THORACIC SURGERY (CARDIOTHORACIC VASCULAR SURGERY)

## 2024-11-17 PROCEDURE — 83735 ASSAY OF MAGNESIUM: CPT | Performed by: HOSPITALIST

## 2024-11-17 PROCEDURE — 250N000013 HC RX MED GY IP 250 OP 250 PS 637: Performed by: PHYSICIAN ASSISTANT

## 2024-11-17 PROCEDURE — 86900 BLOOD TYPING SEROLOGIC ABO: CPT

## 2024-11-17 PROCEDURE — 250N000013 HC RX MED GY IP 250 OP 250 PS 637: Performed by: INTERNAL MEDICINE

## 2024-11-17 PROCEDURE — 80048 BASIC METABOLIC PNL TOTAL CA: CPT | Performed by: INTERNAL MEDICINE

## 2024-11-17 PROCEDURE — 86923 COMPATIBILITY TEST ELECTRIC: CPT

## 2024-11-17 PROCEDURE — 85520 HEPARIN ASSAY: CPT | Performed by: INTERNAL MEDICINE

## 2024-11-17 PROCEDURE — 250N000013 HC RX MED GY IP 250 OP 250 PS 637: Performed by: STUDENT IN AN ORGANIZED HEALTH CARE EDUCATION/TRAINING PROGRAM

## 2024-11-17 PROCEDURE — 99233 SBSQ HOSP IP/OBS HIGH 50: CPT | Performed by: INTERNAL MEDICINE

## 2024-11-17 PROCEDURE — 200N000001 HC R&B ICU

## 2024-11-17 PROCEDURE — 86850 RBC ANTIBODY SCREEN: CPT

## 2024-11-17 PROCEDURE — 99232 SBSQ HOSP IP/OBS MODERATE 35: CPT | Performed by: INTERNAL MEDICINE

## 2024-11-17 RX ORDER — SODIUM CHLORIDE, SODIUM GLUCONATE, SODIUM ACETATE, POTASSIUM CHLORIDE AND MAGNESIUM CHLORIDE 526; 502; 368; 37; 30 MG/100ML; MG/100ML; MG/100ML; MG/100ML; MG/100ML
1000 INJECTION, SOLUTION INTRAVENOUS
Status: CANCELLED | OUTPATIENT
Start: 2024-11-17 | End: 2024-11-17

## 2024-11-17 RX ORDER — HEPARIN SOD,PORCINE/0.9 % NACL 30K/1000ML
INTRAVENOUS SOLUTION INTRAVENOUS
Status: CANCELLED | OUTPATIENT
Start: 2024-11-18

## 2024-11-17 RX ORDER — POTASSIUM CHLORIDE 20MEQ/15ML
20 LIQUID (ML) ORAL ONCE
Status: COMPLETED | OUTPATIENT
Start: 2024-11-17 | End: 2024-11-17

## 2024-11-17 RX ORDER — NOREPINEPHRINE BITARTRATE 0.02 MG/ML
.01-.1 INJECTION, SOLUTION INTRAVENOUS CONTINUOUS
Status: CANCELLED | OUTPATIENT
Start: 2024-11-17

## 2024-11-17 RX ORDER — ASPIRIN 81 MG/1
81 TABLET, CHEWABLE ORAL
Status: COMPLETED | OUTPATIENT
Start: 2024-11-18 | End: 2024-11-18

## 2024-11-17 RX ORDER — CLINDAMYCIN PHOSPHATE 900 MG/50ML
900 INJECTION, SOLUTION INTRAVENOUS
Status: CANCELLED | OUTPATIENT
Start: 2024-11-17

## 2024-11-17 RX ORDER — MAGNESIUM SULFATE HEPTAHYDRATE 40 MG/ML
2 INJECTION, SOLUTION INTRAVENOUS ONCE
Status: COMPLETED | OUTPATIENT
Start: 2024-11-17 | End: 2024-11-18

## 2024-11-17 RX ORDER — ASPIRIN 81 MG/1
162 TABLET, CHEWABLE ORAL
Status: COMPLETED | OUTPATIENT
Start: 2024-11-18 | End: 2024-11-18

## 2024-11-17 RX ORDER — LIDOCAINE 40 MG/G
CREAM TOPICAL
Status: CANCELLED | OUTPATIENT
Start: 2024-11-17

## 2024-11-17 RX ORDER — AMOXICILLIN 250 MG
2 CAPSULE ORAL
Status: DISCONTINUED | OUTPATIENT
Start: 2024-11-17 | End: 2024-11-18

## 2024-11-17 RX ORDER — CLINDAMYCIN PHOSPHATE 900 MG/50ML
900 INJECTION, SOLUTION INTRAVENOUS SEE ADMIN INSTRUCTIONS
Status: CANCELLED | OUTPATIENT
Start: 2024-11-17

## 2024-11-17 RX ORDER — CARVEDILOL 3.12 MG/1
3.12 TABLET ORAL 2 TIMES DAILY WITH MEALS
Status: DISCONTINUED | OUTPATIENT
Start: 2024-11-17 | End: 2024-11-18

## 2024-11-17 RX ORDER — DEXMEDETOMIDINE HYDROCHLORIDE 4 UG/ML
.1-1.2 INJECTION, SOLUTION INTRAVENOUS CONTINUOUS
Status: CANCELLED | OUTPATIENT
Start: 2024-11-17

## 2024-11-17 RX ORDER — MAGNESIUM OXIDE 400 MG/1
400 TABLET ORAL EVERY 4 HOURS
Status: COMPLETED | OUTPATIENT
Start: 2024-11-17 | End: 2024-11-17

## 2024-11-17 RX ORDER — PHENYLEPHRINE HCL IN 0.9% NACL 50MG/250ML
.1-6 PLASTIC BAG, INJECTION (ML) INTRAVENOUS CONTINUOUS
Status: CANCELLED | OUTPATIENT
Start: 2024-11-17

## 2024-11-17 RX ADMIN — FERROUS GLUCONATE 324 MG: 324 TABLET ORAL at 07:42

## 2024-11-17 RX ADMIN — POTASSIUM CHLORIDE 20 MEQ: 20 SOLUTION ORAL at 07:42

## 2024-11-17 RX ADMIN — ALLOPURINOL 100 MG: 100 TABLET ORAL at 07:42

## 2024-11-17 RX ADMIN — OXYCODONE HYDROCHLORIDE 5 MG: 5 TABLET ORAL at 22:05

## 2024-11-17 RX ADMIN — CARVEDILOL 3.12 MG: 3.12 TABLET, FILM COATED ORAL at 17:29

## 2024-11-17 RX ADMIN — PANTOPRAZOLE SODIUM 40 MG: 40 TABLET, DELAYED RELEASE ORAL at 07:42

## 2024-11-17 RX ADMIN — INSULIN GLARGINE 10 UNITS: 100 INJECTION, SOLUTION SUBCUTANEOUS at 08:06

## 2024-11-17 RX ADMIN — POLYPROPYLENE GLYCOL 400, PROPYLENE GLYCOL 1 DROP: .4; .3 LIQUID OPHTHALMIC at 08:06

## 2024-11-17 RX ADMIN — COLCHICINE 0.6 MG: 0.6 TABLET, FILM COATED ORAL at 07:42

## 2024-11-17 RX ADMIN — FINASTERIDE 5 MG: 5 TABLET, FILM COATED ORAL at 07:42

## 2024-11-17 RX ADMIN — ATORVASTATIN CALCIUM 40 MG: 40 TABLET, FILM COATED ORAL at 20:33

## 2024-11-17 RX ADMIN — CARVEDILOL 3.12 MG: 3.12 TABLET, FILM COATED ORAL at 08:38

## 2024-11-17 RX ADMIN — Medication 5 MG: at 22:05

## 2024-11-17 RX ADMIN — ASPIRIN 81 MG: 81 TABLET, COATED ORAL at 07:42

## 2024-11-17 RX ADMIN — HEPARIN SODIUM 1850 UNITS/HR: 10000 INJECTION, SOLUTION INTRAVENOUS at 06:22

## 2024-11-17 RX ADMIN — MAGNESIUM OXIDE TAB 400 MG (241.3 MG ELEMENTAL MG) 400 MG: 400 (241.3 MG) TAB at 07:42

## 2024-11-17 RX ADMIN — GABAPENTIN 300 MG: 300 CAPSULE ORAL at 20:33

## 2024-11-17 RX ADMIN — HEPARIN SODIUM 1850 UNITS/HR: 10000 INJECTION, SOLUTION INTRAVENOUS at 18:05

## 2024-11-17 RX ADMIN — COLCHICINE 0.6 MG: 0.6 TABLET, FILM COATED ORAL at 20:33

## 2024-11-17 RX ADMIN — URSODIOL 300 MG: 300 CAPSULE ORAL at 20:33

## 2024-11-17 RX ADMIN — TORSEMIDE 5 MG: 5 TABLET ORAL at 07:42

## 2024-11-17 RX ADMIN — GABAPENTIN 300 MG: 300 CAPSULE ORAL at 07:42

## 2024-11-17 RX ADMIN — NITROGLYCERIN 40 MCG/MIN: 20 INJECTION INTRAVENOUS at 13:24

## 2024-11-17 RX ADMIN — URSODIOL 300 MG: 300 CAPSULE ORAL at 07:42

## 2024-11-17 RX ADMIN — MAGNESIUM OXIDE TAB 400 MG (241.3 MG ELEMENTAL MG) 400 MG: 400 (241.3 MG) TAB at 11:02

## 2024-11-17 RX ADMIN — GABAPENTIN 300 MG: 300 CAPSULE ORAL at 13:24

## 2024-11-17 NOTE — PROGRESS NOTES
Care Management Follow Up    Length of Stay (days): 3    Expected Discharge Date: 11/19/2024    Anticipated Discharge Plan:   TBD, CABG 11/19     Transportation: TBD    PT Recommendations:    OT Recommendations:        Barriers to Discharge: medical stability/ CABG 11/19, CARDS and CTS surgery following       Prior Living Situation: Pt lives alone at [facility] in [place]. Pt Ind with ADLS, uses walker (has w/c if needs, but hasn't been using). Pt is dependent with some IADLs [facility] provides meals, laundry svcs, escorts to meals, and medication set up. Pt's howie Salcido transports pt if needed, helps with shopping, and appointments. Pt is retired. No home care svcs at this time. Had PT/OT through the [facility]. Pt's MORENA anticipates pt returns to [facility], with home care if needed. Howie Salcido to transport if safe to do so.         NISA Tan at [facility] #[phone].        [facility] cannot accept pt back over the weekend, they do not have an RN over the weekend to do change in condition assessment.        Discussed  Partnership in Safe Discharge Planning  document with patient/family: No     Handoff Completed: Not at this time     Patient/Spokesperson Updated: No, left VM     Additional Information:    RNCM called and left VM for  NISA Tan at [facility] #[phone], to update her on pt's procedure tomorrow.         Next Steps: Follow therapy recs. Communicate with pt's [facility]. CM will continue to monitor progression of care, review team recommendations and provide discharge planning assist as needed.       Shyla Rodriguez RN

## 2024-11-17 NOTE — PROGRESS NOTES
Radiology called to notify that am x-ray significant for balloon pump migration into aortic arch. Recommends pulling back approximately 2 cm. Spoke with [facility] Provider, Dr. Shahid, regarding findings. [facility] will be in to adjust balloon pump placement. Angel Flores RN

## 2024-11-17 NOTE — PROGRESS NOTES
[facility]    Medicine Progress Note - Hospitalist Service    Date of Admission:  11/14/2024    Assessment & Plan   Lance Ibrahim is a 79 year old male with a past medical history of known coronary artery disease with moderate stenosis involving the distal left main, which was not hemodynamically significant in March 2019, with mild to moderate disease scattered in the remainder of the coronary vasculature, nonischemic cardiomyopathy diagnosed in 2019 thought possibly related to hypertension with subsequent normalization of LV function, essential hypertension, history of CVA without residual deficit, CKD stage III who presented to  ER with complaint of worsening shortness of breath associated with orthopnea and paroxysmal nocturnal dyspnea, chest tightness, and new lower extremity edema.  Per Cardiology, his initial ECG showed subtle ST elevations in the anterior leads which looked slightly different from previous ECGs although he did have LVH by voltage criteria. A stat echocardiogram was obtained in  ED, which showed severe decline in LV function compared to his prior study in August. Based on that and his symptoms, STEMI activation per protocol was implemented and transferred to [facility] for coronary angiography.  Coronary angiogram performed, which showed ostial LM-mLM lesion 80% stenosed, pRCA 100% stenosis.  Due to recurrent chest pain, patient underwent AIBP placement on 11/16/24.  On Heparin and NTG gtts. CV surgery consulted and plan tentative CABG on 11/18/24.      #CAD  #ACS  -Coronary angiogram showed ostial LM-mLM lesion 80% stenosed, pRCA 100% stenosed, pLAD 30% stenosis, circumflex normal with a second posterior lateral 50% stenosis, and the right coronary to proximal total occlusion.   -Peak troponin 56  -PET cardiac viability study (11/14/24): perfusion defect involving the apical-mid anterior wall and mid-basal inferior wall on the rest portion of the  examination, this defect resolved on the FDG PET/CT portion of the exam suggesting stunned, but viable myocardium. Severe global hypokinesis with dyssynchrony of the apex.  -ASA 81mg every day, Lipitor 40mg every day, Torsemide 5mg every day, Coreg 3.125mg BID  -Heparin gtt per protocol  -NTG gtt   -IABP 1:1 augmentation placed on 11/16  -Tele  -CV surgery consulted for CABG tentatively planned for 11/18  -Cardiology/CV surgery assistance appreciated    # HFrEF w/ ADHF  -Presumed ischemic origin  -TTE showed LVEF 25-30%, septal/anterior/inferior walls akinetic, mild-mod RV systolic fxn, 1+MR, 1+AR, RVSP ~41mmHg +RAP  -Torsemide 5mg every day  -Coreg 3.125mg BID  -Lisinopril on hold due to DEAN  -Cardiology assistance appreciated  -Strict I/O, daily weights    #Acute hypoxic respiratory failure  #Acute pulmonary edema  #Small right pleural effusion  #Atelectasis  -I.S., Flutter valved  -On Torsemide  -sPO2 98% on 3L O2 PNC  -continuous oximetry    #DEAN on possible CKD stage III  -Cr 1.45 -> 1.38 -> 1.3 -> 1.47  - In August 2024 creatinine normal  - Hold PTA PO Bicarb    #DM2  -Poor control  -A1C 9.1%  -Lantus 10U BID (hold this evening as will be NPO)  -Novolog 1U:20g CHO AC TID. Hold if not eating.  -sliding scale insulin, glucose checks ac/hs  -Glucose checks q4hrs while NPO    #Chronic normocytic anemia  -Hgb 9.4-10.6 at recent baseline  -Current Hgb 9.1 (9.6 yesterday)  -no e/o ABLA  -CBC a.m.    #Hypokalemia  -replaced. K 3.7. Mg 2    #Subclinical hypothyroidism  -TSH 4.28, normal FT4  -recommend repeat TFT 4-6 weeks after acute illness fully resolved    #Peripheral neuropathy  -Gabapentin 300mg TID    #HLD  -LDL 88, HDL 23  -Lipitor 40mg qd    #BPH  -Finasteride    #Chronic gout  -Allopurinol 100mg every day  -Colchicine 0.6mg every day    #GERD  -PPI         Diet: Advance Diet as Tolerated: Regular Diet Adult; Moderate Consistent Carb (60 g CHO per Meal) Diet; 2 gm NA Diet    NPO MN tonight  DVT Prophylaxis:  Heparin gtt  Barnes Catheter: Not present  Lines: None     Cardiac Monitoring: ACTIVE order. Indication: Post- PCI/Angiogram (24 hours)  Code Status: Full Code            Diet: Clear Liquid Diet  Snacks/Supplements Adult: Ensure Clear; With Meals  NPO per Anesthesia Guidelines for Procedure/Surgery Except for: Meds    DVT Prophylaxis: Heparin gtt  Barnes Catheter: Not present  Lines: PRESENT             Cardiac Monitoring: ACTIVE order. Indication: ICU  Code Status: Full Code      Clinically Significant Risk Factors               # Hypoalbuminemia: Lowest albumin = 2.6 g/dL at 11/16/2024  4:06 AM, will monitor as appropriate  # Coagulation Defect: INR = 1.73 (Ref range: 0.85 - 1.15) and/or PTT = >240 Seconds (Ref range: 22 - 38 Seconds), will monitor for bleeding    # Hypertension: Noted on problem list  # Chronic heart failure with reduced ejection fraction: last echo with EF <40%          # DMII: A1C = 9.1 % (Ref range: <5.7 %) within past 6 months   # Overweight: Estimated body mass index is 27.33 kg/m  as calculated from the following:    Height as of this encounter: 1.829 m (6').    Weight as of this encounter: 91.4 kg (201 lb 8 oz)., PRESENT ON ADMISSION     # Financial/Environmental Concerns:           Social Drivers of Health    Tobacco Use: Medium Risk (9/10/2024)    Patient History     Smoking Tobacco Use: Former     Smokeless Tobacco Use: Never     Passive Exposure: Never          Disposition Plan     Medically Ready for Discharge: Anticipated in 5+ Days             Lance Haynes DO, DO  Hospitalist Service  [facility]  Securely message with [employer] (more info)  Text page via [employer] Paging/Directory   ______________________________________________________________________    Interval History   Afebrile. S/p IABP yesterday. 1:1 augmentation. No CP today. Breathing comfortable. No pain.    Physical Exam   Vital Signs: Temp: 97.8  F (36.6  C) Temp src: Oral BP: 138/57 Pulse: 80    Resp: 12 SpO2: 98 % O2 Device: Nasal cannula Oxygen Delivery: 3 LPM  Weight: 201 lbs 8 oz    General appearance - NAD  Lungs - Decreased bases, no wheezing, non labored  Heart - rrr. No peripheral edema. IABP on 1:1 augmentation.  Abdomen - soft, nontender, nondistended, BS+  Neurological - alert, oriented x 3, non-focal  Skin - no c/c/p     Lab/imaging reviewed

## 2024-11-17 NOTE — PROGRESS NOTES
"Imp/plan:  Unstable angina/severe CAD s/p IABP, ivNTG, cont iv heparin, asp, atorvastastn, trsemdie,, add cbc  CM - add carvedilol as HF improved, BP/HR acceptable, cont torsemide, Cr 1.47 K 3.7  Plan CABG tentatively tomorrow    Review of Systems: 12 points negative other than above    /85   Pulse 71   Temp 98  F (36.7  C) (Oral)   Resp 17   Ht 1.829 m (6')   Wt 91.4 kg (201 lb 8 oz)   SpO2 97%   BMI 27.33 kg/m    JVP<7cm, carotids normal  Lungs clear  Cor RRR no c,r,m  Abs soft +BS, no mass  Ext no c,c,e    Lab Results   Component Value Date    HGB 9.6 (L) 11/16/2024     Lab Results   Component Value Date     11/16/2024     No results found for: \"CREATININE\"  No components found for: \"K\"          Efraín Boles MD  Interventional Cardiology   [facility]  [phone]    "

## 2024-11-17 NOTE — PROGRESS NOTES
Brief CV Surgery Note        Briefly, Lance Ibrahim is a 79 year old male w/ a PMH of CAD w/ known LM stenosis, CKD1, DM2, chronic anemia nonischemic cardiomyopathy (thought to be d/t HTN) w/ chronic HFpEF, h/o CVA (bilateral PCA emboli), and spinal stenosis w/ spinal cord stimulator in place and found to have NSTEMI w/ ostial to mid LM stenosis. CV surgery was consulted for consideration of surgical revascularization. We recommend CABG.    Patient seen and examined by our team today. IABP placed per interventional cardiology yesterday PM. Remains chest pain free since that time. Extremities w/ palpable dorsalis pedis pulses.    Vitals currently stable on 3L NC. Labs today w/ Cr 1.47 (1.30), Hgb 9.1 (9.6). Remains on heparin and nitro gtt.     - CABG w/ Dr. Evans rescheduled to 11/18 at 1000.  - Monitor closely, if chest pain recurs may need earlier intervention.  - Preop workup complete.  - Preop orders signed and held.    - Remainder of cares per primary team.  - We will follow.  - Please feel free to reach out w/ questions or concerns.  - Patient and plan discussed w/ Dr. Hurt and Dr. Evans.       Ernestina Barr PA-C  [facility] Cardiothoracic Surgery  Pager: [phone]  November 17, 2024

## 2024-11-17 NOTE — PLAN OF CARE
Goal Outcome Evaluation:       [facility] - ICU    RN Progress Note:            Pertinent Assessments:      Please refer to flowsheet rows for full assessment     Aox4. Communicate needs appropriately. Denies chest pain this shift. Pt continues to be on nitroglycerin and heparin drip as ordered. Hemodynamically within acceptable limit. Right groin IABP site and distal circulation intact. A.febrile. adequate UOP. Will continue to monitor and update MD as needed.            Key Events - This Shift:       Uneventful shift. Bedside handoff report given to receiving RN.                Barriers to Discharge / Downgrade:     -IABP  -nitroglycerin drip  -Heparin drip         Point of Contact Update: YES-OR-NO: Yes  Pt's stepson was at the bedside and received pt update by pt's primary RN and pt itself.

## 2024-11-17 NOTE — PLAN OF CARE
[facility] - ICU    RN Progress Note:    Pt is alert, oriented x 4, and able to make needs known. Over NOC, SaO2 maintained within ordered parameters with supplemental O2 via Oxymask at 3 LPM. Pt remains hemodynamically stable with IABP in place via right groin access. CMS RLE remains intact.  Pt denies chest pain with nitroglycerine infusing, as ordered. Heparin infusion continued, per Nurse managed infusion Protocol. Adequate urinary output. Will continue to monitor. Angel Flores RN           Pertinent Assessments:      Please refer to flowsheet rows for full assessment     Vital signs.   CMS.            Key Events - This Shift:       No overnight events.                 Barriers to Discharge / Downgrade:     Cardiac augmentation via IABP.  Nitroglycerine infusion for management of chest pain.  Heparin infusion infusion, per Nurse managed Protocol.   Plan for surgical intervention with [facility], on Monday.

## 2024-11-18 ENCOUNTER — ANESTHESIA (OUTPATIENT)
Dept: SURGERY | Facility: HOSPITAL | Age: 79
End: 2024-11-18
Payer: COMMERCIAL

## 2024-11-18 ENCOUNTER — APPOINTMENT (OUTPATIENT)
Dept: RADIOLOGY | Facility: HOSPITAL | Age: 79
End: 2024-11-18
Attending: INTERNAL MEDICINE
Payer: COMMERCIAL

## 2024-11-18 ENCOUNTER — MEDICAL CORRESPONDENCE (OUTPATIENT)
Dept: HEALTH INFORMATION MANAGEMENT | Facility: CLINIC | Age: 79
End: 2024-11-18

## 2024-11-18 ENCOUNTER — APPOINTMENT (OUTPATIENT)
Dept: RADIOLOGY | Facility: HOSPITAL | Age: 79
End: 2024-11-18
Payer: COMMERCIAL

## 2024-11-18 ENCOUNTER — ANESTHESIA EVENT (OUTPATIENT)
Dept: SURGERY | Facility: HOSPITAL | Age: 79
End: 2024-11-18
Payer: COMMERCIAL

## 2024-11-18 PROBLEM — I21.3 ST ELEVATION MI (STEMI) (H): Status: ACTIVE | Noted: 2024-11-18

## 2024-11-18 LAB
ALBUMIN SERPL BCG-MCNC: 2.7 G/DL (ref 3.5–5.2)
ALBUMIN SERPL BCG-MCNC: 2.7 G/DL (ref 3.5–5.2)
ALBUMIN SERPL BCG-MCNC: 3.1 G/DL (ref 3.5–5.2)
ALP SERPL-CCNC: 254 U/L (ref 40–150)
ALP SERPL-CCNC: 314 U/L (ref 40–150)
ALP SERPL-CCNC: 333 U/L (ref 40–150)
ALT SERPL W P-5'-P-CCNC: 24 U/L (ref 0–70)
ALT SERPL W P-5'-P-CCNC: 25 U/L (ref 0–70)
ALT SERPL W P-5'-P-CCNC: 26 U/L (ref 0–70)
ANION GAP SERPL CALCULATED.3IONS-SCNC: 10 MMOL/L (ref 7–15)
ANION GAP SERPL CALCULATED.3IONS-SCNC: 11 MMOL/L (ref 7–15)
ANION GAP SERPL CALCULATED.3IONS-SCNC: 14 MMOL/L (ref 7–15)
APTT PPP: 49 SECONDS (ref 22–38)
APTT PPP: 68 SECONDS (ref 22–38)
AST SERPL W P-5'-P-CCNC: 43 U/L (ref 0–45)
AST SERPL W P-5'-P-CCNC: 45 U/L (ref 0–45)
AST SERPL W P-5'-P-CCNC: 54 U/L (ref 0–45)
BASE EXCESS BLDA CALC-SCNC: -1.2 MMOL/L (ref -3–3)
BASE EXCESS BLDA CALC-SCNC: -3.4 MMOL/L (ref -3–3)
BASE EXCESS BLDA CALC-SCNC: -3.8 MMOL/L (ref -3–3)
BASE EXCESS BLDA CALC-SCNC: 0.5 MMOL/L (ref -3–3)
BASE EXCESS BLDV CALC-SCNC: -1.3 MMOL/L (ref -3–3)
BASOPHILS # BLD AUTO: 0.1 10E3/UL (ref 0–0.2)
BASOPHILS NFR BLD AUTO: 1 %
BILIRUB SERPL-MCNC: 1 MG/DL
BILIRUB SERPL-MCNC: 1 MG/DL
BILIRUB SERPL-MCNC: 1.2 MG/DL
BLD PROD TYP BPU: NORMAL
BLOOD COMPONENT TYPE: NORMAL
BUN SERPL-MCNC: 21.1 MG/DL (ref 8–23)
BUN SERPL-MCNC: 23.3 MG/DL (ref 8–23)
BUN SERPL-MCNC: 23.4 MG/DL (ref 8–23)
CA-I BLD-MCNC: 4.3 MG/DL (ref 4.4–5.2)
CA-I BLD-MCNC: 4.4 MG/DL (ref 4.4–5.2)
CA-I BLD-MCNC: 4.4 MG/DL (ref 4.4–5.2)
CA-I BLD-MCNC: 4.7 MG/DL (ref 4.4–5.2)
CA-I BLD-MCNC: 4.9 MG/DL (ref 4.4–5.2)
CALCIUM SERPL-MCNC: 8.3 MG/DL (ref 8.8–10.4)
CALCIUM SERPL-MCNC: 8.4 MG/DL (ref 8.8–10.4)
CALCIUM SERPL-MCNC: 8.5 MG/DL (ref 8.8–10.4)
CHLORIDE SERPL-SCNC: 100 MMOL/L (ref 98–107)
CHLORIDE SERPL-SCNC: 101 MMOL/L (ref 98–107)
CHLORIDE SERPL-SCNC: 104 MMOL/L (ref 98–107)
CODING SYSTEM: NORMAL
COHGB MFR BLD: 99.3 % (ref 95–96)
CREAT SERPL-MCNC: 1.33 MG/DL (ref 0.67–1.17)
CREAT SERPL-MCNC: 1.4 MG/DL (ref 0.67–1.17)
CREAT SERPL-MCNC: 1.41 MG/DL (ref 0.67–1.17)
CROSSMATCH: NORMAL
EGFRCR SERPLBLD CKD-EPI 2021: 50 ML/MIN/1.73M2
EGFRCR SERPLBLD CKD-EPI 2021: 51 ML/MIN/1.73M2
EGFRCR SERPLBLD CKD-EPI 2021: 54 ML/MIN/1.73M2
EOSINOPHIL # BLD AUTO: 0.2 10E3/UL (ref 0–0.7)
EOSINOPHIL NFR BLD AUTO: 3 %
ERYTHROCYTE [DISTWIDTH] IN BLOOD BY AUTOMATED COUNT: 15.8 % (ref 10–15)
ERYTHROCYTE [DISTWIDTH] IN BLOOD BY AUTOMATED COUNT: 15.9 % (ref 10–15)
ERYTHROCYTE [DISTWIDTH] IN BLOOD BY AUTOMATED COUNT: 16.3 % (ref 10–15)
FIBRINOGEN PPP-MCNC: 531 MG/DL (ref 170–510)
FIBRINOGEN PPP-MCNC: 653 MG/DL (ref 170–510)
GLUCOSE BLD-MCNC: 112 MG/DL (ref 70–99)
GLUCOSE BLD-MCNC: 144 MG/DL (ref 70–99)
GLUCOSE BLD-MCNC: 147 MG/DL (ref 70–99)
GLUCOSE BLD-MCNC: 155 MG/DL (ref 70–99)
GLUCOSE BLDC GLUCOMTR-MCNC: 115 MG/DL (ref 70–99)
GLUCOSE BLDC GLUCOMTR-MCNC: 121 MG/DL (ref 70–99)
GLUCOSE BLDC GLUCOMTR-MCNC: 123 MG/DL (ref 70–99)
GLUCOSE BLDC GLUCOMTR-MCNC: 124 MG/DL (ref 70–99)
GLUCOSE BLDC GLUCOMTR-MCNC: 130 MG/DL (ref 70–99)
GLUCOSE BLDC GLUCOMTR-MCNC: 132 MG/DL (ref 70–99)
GLUCOSE BLDC GLUCOMTR-MCNC: 142 MG/DL (ref 70–99)
GLUCOSE BLDC GLUCOMTR-MCNC: 161 MG/DL (ref 70–99)
GLUCOSE BLDC GLUCOMTR-MCNC: 185 MG/DL (ref 70–99)
GLUCOSE BLDC GLUCOMTR-MCNC: 187 MG/DL (ref 70–99)
GLUCOSE BLDC GLUCOMTR-MCNC: 99 MG/DL (ref 70–99)
GLUCOSE SERPL-MCNC: 124 MG/DL (ref 70–99)
GLUCOSE SERPL-MCNC: 181 MG/DL (ref 70–99)
GLUCOSE SERPL-MCNC: 93 MG/DL (ref 70–99)
HCO3 BLD-SCNC: 20 MMOL/L (ref 21–28)
HCO3 BLDA-SCNC: 22 MMOL/L (ref 21–28)
HCO3 BLDA-SCNC: 23 MMOL/L (ref 21–28)
HCO3 BLDA-SCNC: 25 MMOL/L (ref 21–28)
HCO3 BLDV-SCNC: 23 MMOL/L (ref 21–28)
HCO3 SERPL-SCNC: 20 MMOL/L (ref 22–29)
HCO3 SERPL-SCNC: 24 MMOL/L (ref 22–29)
HCO3 SERPL-SCNC: 25 MMOL/L (ref 22–29)
HCT VFR BLD AUTO: 30.4 % (ref 40–53)
HCT VFR BLD AUTO: 31.6 % (ref 40–53)
HCT VFR BLD AUTO: 33.4 % (ref 40–53)
HGB BLD-MCNC: 10.8 G/DL (ref 13.3–17.7)
HGB BLD-MCNC: 8.7 G/DL (ref 13.3–17.7)
HGB BLD-MCNC: 9.1 G/DL (ref 13.3–17.7)
HGB BLD-MCNC: 9.2 G/DL (ref 13.3–17.7)
HGB BLD-MCNC: 9.4 G/DL (ref 13.3–17.7)
HGB BLD-MCNC: 9.6 G/DL (ref 13.3–17.7)
HGB BLD-MCNC: 9.8 G/DL (ref 13.3–17.7)
HOLD SPECIMEN: NORMAL
IMM GRANULOCYTES # BLD: 0 10E3/UL
IMM GRANULOCYTES NFR BLD: 1 %
INR PPP: 1 (ref 0.85–1.15)
INR PPP: 2.04 (ref 0.85–1.15)
ISSUE DATE AND TIME: NORMAL
LACTATE BLD-SCNC: 0.7 MMOL/L (ref 0.7–2)
LACTATE BLD-SCNC: 1.1 MMOL/L (ref 0.7–2)
LACTATE BLD-SCNC: 1.1 MMOL/L (ref 0.7–2)
LACTATE BLD-SCNC: 1.4 MMOL/L (ref 0.7–2)
LACTATE SERPL-SCNC: 1.8 MMOL/L (ref 0.7–2)
LYMPHOCYTES # BLD AUTO: 2.1 10E3/UL (ref 0.8–5.3)
LYMPHOCYTES NFR BLD AUTO: 26 %
MAGNESIUM SERPL-MCNC: 2.5 MG/DL (ref 1.7–2.3)
MAGNESIUM SERPL-MCNC: 2.8 MG/DL (ref 1.7–2.3)
MCH RBC QN AUTO: 27.6 PG (ref 26.5–33)
MCH RBC QN AUTO: 28 PG (ref 26.5–33)
MCH RBC QN AUTO: 29.1 PG (ref 26.5–33)
MCHC RBC AUTO-ENTMCNC: 29.7 G/DL (ref 31.5–36.5)
MCHC RBC AUTO-ENTMCNC: 30.3 G/DL (ref 31.5–36.5)
MCHC RBC AUTO-ENTMCNC: 32.3 G/DL (ref 31.5–36.5)
MCV RBC AUTO: 90 FL (ref 78–100)
MCV RBC AUTO: 93 FL (ref 78–100)
MCV RBC AUTO: 93 FL (ref 78–100)
MONOCYTES # BLD AUTO: 0.6 10E3/UL (ref 0–1.3)
MONOCYTES NFR BLD AUTO: 8 %
MRSA DNA SPEC QL NAA+PROBE: NEGATIVE
NEUTROPHILS # BLD AUTO: 5 10E3/UL (ref 1.6–8.3)
NEUTROPHILS NFR BLD AUTO: 62 %
NRBC # BLD AUTO: 0 10E3/UL
NRBC BLD AUTO-RTO: 0 /100
O2/TOTAL GAS SETTING VFR VENT: 40 %
OXYHGB MFR BLDA: 97 % (ref 92–100)
OXYHGB MFR BLDA: 98 % (ref 92–100)
OXYHGB MFR BLDA: 99 % (ref 92–100)
OXYHGB MFR BLDV: 80 % (ref 70–75)
PCO2 BLD: 30 MM HG (ref 35–45)
PCO2 BLDA: 38 MM HG (ref 35–45)
PCO2 BLDA: 43 MM HG (ref 35–45)
PCO2 BLDA: 46 MM HG (ref 35–45)
PCO2 BLDV: 56 MM HG (ref 40–50)
PEEP: 5 CM H2O
PH BLD: 7.42 [PH] (ref 7.35–7.45)
PH BLDA: 7.31 [PH] (ref 7.35–7.45)
PH BLDA: 7.36 [PH] (ref 7.35–7.45)
PH BLDA: 7.43 [PH] (ref 7.35–7.45)
PH BLDV: 7.27 [PH] (ref 7.32–7.43)
PHOSPHATE SERPL-MCNC: 4.3 MG/DL (ref 2.5–4.5)
PLATELET # BLD AUTO: 242 10E3/UL (ref 150–450)
PLATELET # BLD AUTO: 262 10E3/UL (ref 150–450)
PLATELET # BLD AUTO: 325 10E3/UL (ref 150–450)
PO2 BLD: 119 MM HG (ref 80–105)
PO2 BLDA: 114 MM HG (ref 80–105)
PO2 BLDA: 226 MM HG (ref 80–105)
PO2 BLDA: 375 MM HG (ref 80–105)
PO2 BLDV: 54 MM HG (ref 25–47)
POTASSIUM BLD-SCNC: 4.2 MMOL/L (ref 3.4–5.3)
POTASSIUM BLD-SCNC: 5.1 MMOL/L (ref 3.4–5.3)
POTASSIUM BLD-SCNC: 5.2 MMOL/L (ref 3.4–5.3)
POTASSIUM BLD-SCNC: 5.3 MMOL/L (ref 3.4–5.3)
POTASSIUM SERPL-SCNC: 4.3 MMOL/L (ref 3.4–5.3)
POTASSIUM SERPL-SCNC: 4.4 MMOL/L (ref 3.4–5.3)
POTASSIUM SERPL-SCNC: 4.7 MMOL/L (ref 3.4–5.3)
PREALB SERPL-MCNC: 8.9 MG/DL (ref 20–40)
PROT SERPL-MCNC: 6.1 G/DL (ref 6.4–8.3)
PROT SERPL-MCNC: 6.5 G/DL (ref 6.4–8.3)
PROT SERPL-MCNC: 6.7 G/DL (ref 6.4–8.3)
RBC # BLD AUTO: 3.28 10E6/UL (ref 4.4–5.9)
RBC # BLD AUTO: 3.4 10E6/UL (ref 4.4–5.9)
RBC # BLD AUTO: 3.71 10E6/UL (ref 4.4–5.9)
SA TARGET DNA: NEGATIVE
SAO2 % BLDA: 100 % (ref 95–96)
SAO2 % BLDA: 100 % (ref 95–96)
SAO2 % BLDA: 98 % (ref 92–100)
SAO2 % BLDA: 99 % (ref 95–96)
SAO2 % BLDV: 82 % (ref 70–75)
SODIUM BLD-SCNC: 137 MMOL/L (ref 135–145)
SODIUM SERPL-SCNC: 134 MMOL/L (ref 135–145)
SODIUM SERPL-SCNC: 137 MMOL/L (ref 135–145)
SODIUM SERPL-SCNC: 138 MMOL/L (ref 135–145)
UFH PPP CHRO-ACNC: 0.51 IU/ML
UNIT ABO/RH: NORMAL
UNIT NUMBER: NORMAL
UNIT STATUS: NORMAL
UNIT TYPE ISBT: 7300
WBC # BLD AUTO: 16.4 10E3/UL (ref 4–11)
WBC # BLD AUTO: 17.6 10E3/UL (ref 4–11)
WBC # BLD AUTO: 8 10E3/UL (ref 4–11)

## 2024-11-18 PROCEDURE — 85384 FIBRINOGEN ACTIVITY: CPT

## 2024-11-18 PROCEDURE — 06BQ4ZZ EXCISION OF LEFT SAPHENOUS VEIN, PERCUTANEOUS ENDOSCOPIC APPROACH: ICD-10-PCS | Performed by: PHYSICIAN ASSISTANT

## 2024-11-18 PROCEDURE — P9045 ALBUMIN (HUMAN), 5%, 250 ML: HCPCS | Performed by: STUDENT IN AN ORGANIZED HEALTH CARE EDUCATION/TRAINING PROGRAM

## 2024-11-18 PROCEDURE — 258N000003 HC RX IP 258 OP 636: Performed by: STUDENT IN AN ORGANIZED HEALTH CARE EDUCATION/TRAINING PROGRAM

## 2024-11-18 PROCEDURE — 250N000009 HC RX 250

## 2024-11-18 PROCEDURE — 250N000009 HC RX 250: Performed by: THORACIC SURGERY (CARDIOTHORACIC VASCULAR SURGERY)

## 2024-11-18 PROCEDURE — 250N000013 HC RX MED GY IP 250 OP 250 PS 637: Performed by: INTERNAL MEDICINE

## 2024-11-18 PROCEDURE — C1898 LEAD, PMKR, OTHER THAN TRANS: HCPCS | Performed by: THORACIC SURGERY (CARDIOTHORACIC VASCULAR SURGERY)

## 2024-11-18 PROCEDURE — 84132 ASSAY OF SERUM POTASSIUM: CPT

## 2024-11-18 PROCEDURE — 360N000079 HC SURGERY LEVEL 6, PER MIN: Performed by: THORACIC SURGERY (CARDIOTHORACIC VASCULAR SURGERY)

## 2024-11-18 PROCEDURE — 82040 ASSAY OF SERUM ALBUMIN: CPT

## 2024-11-18 PROCEDURE — 83735 ASSAY OF MAGNESIUM: CPT | Performed by: INTERNAL MEDICINE

## 2024-11-18 PROCEDURE — 85014 HEMATOCRIT: CPT

## 2024-11-18 PROCEDURE — 84460 ALANINE AMINO (ALT) (SGPT): CPT | Performed by: INTERNAL MEDICINE

## 2024-11-18 PROCEDURE — 250N000009 HC RX 250: Performed by: STUDENT IN AN ORGANIZED HEALTH CARE EDUCATION/TRAINING PROGRAM

## 2024-11-18 PROCEDURE — 85048 AUTOMATED LEUKOCYTE COUNT: CPT | Performed by: INTERNAL MEDICINE

## 2024-11-18 PROCEDURE — 250N000011 HC RX IP 250 OP 636: Performed by: THORACIC SURGERY (CARDIOTHORACIC VASCULAR SURGERY)

## 2024-11-18 PROCEDURE — 82310 ASSAY OF CALCIUM: CPT

## 2024-11-18 PROCEDURE — 82330 ASSAY OF CALCIUM: CPT

## 2024-11-18 PROCEDURE — 85730 THROMBOPLASTIN TIME PARTIAL: CPT | Performed by: THORACIC SURGERY (CARDIOTHORACIC VASCULAR SURGERY)

## 2024-11-18 PROCEDURE — 021109W BYPASS CORONARY ARTERY, TWO ARTERIES FROM AORTA WITH AUTOLOGOUS VENOUS TISSUE, OPEN APPROACH: ICD-10-PCS | Performed by: THORACIC SURGERY (CARDIOTHORACIC VASCULAR SURGERY)

## 2024-11-18 PROCEDURE — 250N000011 HC RX IP 250 OP 636: Performed by: INTERNAL MEDICINE

## 2024-11-18 PROCEDURE — 85610 PROTHROMBIN TIME: CPT

## 2024-11-18 PROCEDURE — 250N000011 HC RX IP 250 OP 636

## 2024-11-18 PROCEDURE — P9016 RBC LEUKOCYTES REDUCED: HCPCS

## 2024-11-18 PROCEDURE — 82310 ASSAY OF CALCIUM: CPT | Performed by: INTERNAL MEDICINE

## 2024-11-18 PROCEDURE — 272N000004 HC RX 272: Performed by: THORACIC SURGERY (CARDIOTHORACIC VASCULAR SURGERY)

## 2024-11-18 PROCEDURE — 84134 ASSAY OF PREALBUMIN: CPT

## 2024-11-18 PROCEDURE — 999N000009 HC STATISTIC AIRWAY CARE

## 2024-11-18 PROCEDURE — 87641 MR-STAPH DNA AMP PROBE: CPT

## 2024-11-18 PROCEDURE — 250N000013 HC RX MED GY IP 250 OP 250 PS 637

## 2024-11-18 PROCEDURE — 250N000025 HC SEVOFLURANE, PER MIN: Performed by: THORACIC SURGERY (CARDIOTHORACIC VASCULAR SURGERY)

## 2024-11-18 PROCEDURE — 82805 BLOOD GASES W/O2 SATURATION: CPT

## 2024-11-18 PROCEDURE — 99291 CRITICAL CARE FIRST HOUR: CPT | Performed by: INTERNAL MEDICINE

## 2024-11-18 PROCEDURE — 99207 PR NO BILLABLE SERVICE THIS VISIT: CPT | Performed by: INTERNAL MEDICINE

## 2024-11-18 PROCEDURE — 5A1221Z PERFORMANCE OF CARDIAC OUTPUT, CONTINUOUS: ICD-10-PCS | Performed by: THORACIC SURGERY (CARDIOTHORACIC VASCULAR SURGERY)

## 2024-11-18 PROCEDURE — 84295 ASSAY OF SERUM SODIUM: CPT

## 2024-11-18 PROCEDURE — 36415 COLL VENOUS BLD VENIPUNCTURE: CPT | Performed by: INTERNAL MEDICINE

## 2024-11-18 PROCEDURE — 84100 ASSAY OF PHOSPHORUS: CPT

## 2024-11-18 PROCEDURE — 33518 CABG ARTERY-VEIN TWO: CPT | Performed by: THORACIC SURGERY (CARDIOTHORACIC VASCULAR SURGERY)

## 2024-11-18 PROCEDURE — 250N000011 HC RX IP 250 OP 636: Performed by: STUDENT IN AN ORGANIZED HEALTH CARE EDUCATION/TRAINING PROGRAM

## 2024-11-18 PROCEDURE — 410N000004: Performed by: THORACIC SURGERY (CARDIOTHORACIC VASCULAR SURGERY)

## 2024-11-18 PROCEDURE — C1760 CLOSURE DEV, VASC: HCPCS | Performed by: THORACIC SURGERY (CARDIOTHORACIC VASCULAR SURGERY)

## 2024-11-18 PROCEDURE — C1781 MESH (IMPLANTABLE): HCPCS | Performed by: THORACIC SURGERY (CARDIOTHORACIC VASCULAR SURGERY)

## 2024-11-18 PROCEDURE — 999N000055 HC STATISTIC END TITIAL CO2 MONITORING

## 2024-11-18 PROCEDURE — 85520 HEPARIN ASSAY: CPT | Performed by: INTERNAL MEDICINE

## 2024-11-18 PROCEDURE — 85041 AUTOMATED RBC COUNT: CPT

## 2024-11-18 PROCEDURE — 250N000015 HC RX FACTOR IP MED 636 OP 636: Performed by: THORACIC SURGERY (CARDIOTHORACIC VASCULAR SURGERY)

## 2024-11-18 PROCEDURE — 85384 FIBRINOGEN ACTIVITY: CPT | Performed by: THORACIC SURGERY (CARDIOTHORACIC VASCULAR SURGERY)

## 2024-11-18 PROCEDURE — 272N000002 HC OR SUPPLY OTHER OPNP: Performed by: THORACIC SURGERY (CARDIOTHORACIC VASCULAR SURGERY)

## 2024-11-18 PROCEDURE — 85004 AUTOMATED DIFF WBC COUNT: CPT | Performed by: INTERNAL MEDICINE

## 2024-11-18 PROCEDURE — 84155 ASSAY OF PROTEIN SERUM: CPT

## 2024-11-18 PROCEDURE — 02HP32Z INSERTION OF MONITORING DEVICE INTO PULMONARY TRUNK, PERCUTANEOUS APPROACH: ICD-10-PCS | Performed by: THORACIC SURGERY (CARDIOTHORACIC VASCULAR SURGERY)

## 2024-11-18 PROCEDURE — P9016 RBC LEUKOCYTES REDUCED: HCPCS | Performed by: THORACIC SURGERY (CARDIOTHORACIC VASCULAR SURGERY)

## 2024-11-18 PROCEDURE — 85730 THROMBOPLASTIN TIME PARTIAL: CPT

## 2024-11-18 PROCEDURE — 999N000157 HC STATISTIC RCP TIME EA 10 MIN

## 2024-11-18 PROCEDURE — 33533 CABG ARTERIAL SINGLE: CPT | Performed by: THORACIC SURGERY (CARDIOTHORACIC VASCULAR SURGERY)

## 2024-11-18 PROCEDURE — 410N000003 HC PER-PERFUSION 1ST 30 MIN: Performed by: THORACIC SURGERY (CARDIOTHORACIC VASCULAR SURGERY)

## 2024-11-18 PROCEDURE — 999N000065 XR CHEST PORT 1 VIEW

## 2024-11-18 PROCEDURE — 83605 ASSAY OF LACTIC ACID: CPT

## 2024-11-18 PROCEDURE — 85018 HEMOGLOBIN: CPT | Performed by: INTERNAL MEDICINE

## 2024-11-18 PROCEDURE — 83735 ASSAY OF MAGNESIUM: CPT

## 2024-11-18 PROCEDURE — 258N000003 HC RX IP 258 OP 636

## 2024-11-18 PROCEDURE — 02100Z9 BYPASS CORONARY ARTERY, ONE ARTERY FROM LEFT INTERNAL MAMMARY, OPEN APPROACH: ICD-10-PCS | Performed by: THORACIC SURGERY (CARDIOTHORACIC VASCULAR SURGERY)

## 2024-11-18 PROCEDURE — 87640 STAPH A DNA AMP PROBE: CPT

## 2024-11-18 PROCEDURE — 250N000012 HC RX MED GY IP 250 OP 636 PS 637: Performed by: THORACIC SURGERY (CARDIOTHORACIC VASCULAR SURGERY)

## 2024-11-18 PROCEDURE — P9045 ALBUMIN (HUMAN), 5%, 250 ML: HCPCS | Mod: JZ | Performed by: THORACIC SURGERY (CARDIOTHORACIC VASCULAR SURGERY)

## 2024-11-18 PROCEDURE — 370N000017 HC ANESTHESIA TECHNICAL FEE, PER MIN: Performed by: THORACIC SURGERY (CARDIOTHORACIC VASCULAR SURGERY)

## 2024-11-18 PROCEDURE — 33508 ENDOSCOPIC VEIN HARVEST: CPT | Mod: 59 | Performed by: THORACIC SURGERY (CARDIOTHORACIC VASCULAR SURGERY)

## 2024-11-18 PROCEDURE — 272N000001 HC OR GENERAL SUPPLY STERILE: Performed by: THORACIC SURGERY (CARDIOTHORACIC VASCULAR SURGERY)

## 2024-11-18 PROCEDURE — 71045 X-RAY EXAM CHEST 1 VIEW: CPT

## 2024-11-18 PROCEDURE — 200N000001 HC R&B ICU

## 2024-11-18 PROCEDURE — P9016 RBC LEUKOCYTES REDUCED: HCPCS | Performed by: STUDENT IN AN ORGANIZED HEALTH CARE EDUCATION/TRAINING PROGRAM

## 2024-11-18 PROCEDURE — 85610 PROTHROMBIN TIME: CPT | Performed by: THORACIC SURGERY (CARDIOTHORACIC VASCULAR SURGERY)

## 2024-11-18 PROCEDURE — 94002 VENT MGMT INPAT INIT DAY: CPT

## 2024-11-18 PROCEDURE — 85027 COMPLETE CBC AUTOMATED: CPT | Performed by: THORACIC SURGERY (CARDIOTHORACIC VASCULAR SURGERY)

## 2024-11-18 PROCEDURE — 36415 COLL VENOUS BLD VENIPUNCTURE: CPT

## 2024-11-18 PROCEDURE — 84155 ASSAY OF PROTEIN SERUM: CPT | Performed by: INTERNAL MEDICINE

## 2024-11-18 DEVICE — BARD® PTFE FELT, 5.1 CM X 5.1 CM
Type: IMPLANTABLE DEVICE | Site: CHEST | Status: FUNCTIONAL
Brand: BARD® PTFE FELT

## 2024-11-18 RX ORDER — NOREPINEPHRINE BITARTRATE 0.02 MG/ML
INJECTION, SOLUTION INTRAVENOUS CONTINUOUS PRN
Status: DISCONTINUED | OUTPATIENT
Start: 2024-11-18 | End: 2024-11-18

## 2024-11-18 RX ORDER — SODIUM CHLORIDE, SODIUM GLUCONATE, SODIUM ACETATE, POTASSIUM CHLORIDE AND MAGNESIUM CHLORIDE 526; 502; 368; 37; 30 MG/100ML; MG/100ML; MG/100ML; MG/100ML; MG/100ML
1000 INJECTION, SOLUTION INTRAVENOUS
Status: DISCONTINUED | OUTPATIENT
Start: 2024-11-18 | End: 2024-11-18

## 2024-11-18 RX ORDER — VANCOMYCIN HYDROCHLORIDE 1 G/20ML
INJECTION, POWDER, LYOPHILIZED, FOR SOLUTION INTRAVENOUS PRN
Status: DISCONTINUED | OUTPATIENT
Start: 2024-11-18 | End: 2024-11-18 | Stop reason: HOSPADM

## 2024-11-18 RX ORDER — PROTAMINE SULFATE 10 MG/ML
INJECTION, SOLUTION INTRAVENOUS PRN
Status: DISCONTINUED | OUTPATIENT
Start: 2024-11-18 | End: 2024-11-18

## 2024-11-18 RX ORDER — DEXTROSE MONOHYDRATE 25 G/50ML
25-50 INJECTION, SOLUTION INTRAVENOUS
Status: DISCONTINUED | OUTPATIENT
Start: 2024-11-18 | End: 2024-11-26 | Stop reason: HOSPADM

## 2024-11-18 RX ORDER — ASPIRIN 81 MG/1
162 TABLET, CHEWABLE ORAL ONCE
Status: COMPLETED | OUTPATIENT
Start: 2024-11-18 | End: 2024-11-18

## 2024-11-18 RX ORDER — ALLOPURINOL 100 MG/1
100 TABLET ORAL DAILY
Status: DISCONTINUED | OUTPATIENT
Start: 2024-11-19 | End: 2024-11-26 | Stop reason: HOSPADM

## 2024-11-18 RX ORDER — ETOMIDATE 2 MG/ML
INJECTION INTRAVENOUS PRN
Status: DISCONTINUED | OUTPATIENT
Start: 2024-11-18 | End: 2024-11-18

## 2024-11-18 RX ORDER — CALCIUM GLUCONATE 20 MG/ML
2 INJECTION, SOLUTION INTRAVENOUS
Status: ACTIVE | OUTPATIENT
Start: 2024-11-18 | End: 2024-11-26

## 2024-11-18 RX ORDER — CHLORHEXIDINE GLUCONATE ORAL RINSE 1.2 MG/ML
15 SOLUTION DENTAL EVERY 12 HOURS SCHEDULED
Status: DISCONTINUED | OUTPATIENT
Start: 2024-11-18 | End: 2024-11-22

## 2024-11-18 RX ORDER — GLIPIZIDE 2.5 MG/1
2.5 TABLET, EXTENDED RELEASE ORAL DAILY
Status: DISCONTINUED | OUTPATIENT
Start: 2024-11-18 | End: 2024-11-26 | Stop reason: HOSPADM

## 2024-11-18 RX ORDER — HEPARIN SOD,PORCINE/0.9 % NACL 30K/1000ML
INTRAVENOUS SOLUTION INTRAVENOUS
Status: DISCONTINUED | OUTPATIENT
Start: 2024-11-18 | End: 2024-11-18 | Stop reason: HOSPADM

## 2024-11-18 RX ORDER — ASPIRIN 81 MG/1
162 TABLET, CHEWABLE ORAL DAILY
Status: DISCONTINUED | OUTPATIENT
Start: 2024-11-19 | End: 2024-11-26

## 2024-11-18 RX ORDER — NITROGLYCERIN 10 MG/100ML
INJECTION INTRAVENOUS PRN
Status: DISCONTINUED | OUTPATIENT
Start: 2024-11-18 | End: 2024-11-18

## 2024-11-18 RX ORDER — PROCHLORPERAZINE MALEATE 5 MG/1
5 TABLET ORAL EVERY 6 HOURS PRN
Status: DISCONTINUED | OUTPATIENT
Start: 2024-11-18 | End: 2024-11-26 | Stop reason: HOSPADM

## 2024-11-18 RX ORDER — NALOXONE HYDROCHLORIDE 0.4 MG/ML
0.4 INJECTION, SOLUTION INTRAMUSCULAR; INTRAVENOUS; SUBCUTANEOUS
Status: DISCONTINUED | OUTPATIENT
Start: 2024-11-18 | End: 2024-11-26 | Stop reason: HOSPADM

## 2024-11-18 RX ORDER — URSODIOL 300 MG/1
300 CAPSULE ORAL 2 TIMES DAILY
Status: DISCONTINUED | OUTPATIENT
Start: 2024-11-19 | End: 2024-11-26 | Stop reason: HOSPADM

## 2024-11-18 RX ORDER — SODIUM CHLORIDE, SODIUM LACTATE, POTASSIUM CHLORIDE, CALCIUM CHLORIDE 600; 310; 30; 20 MG/100ML; MG/100ML; MG/100ML; MG/100ML
INJECTION, SOLUTION INTRAVENOUS CONTINUOUS
Status: DISCONTINUED | OUTPATIENT
Start: 2024-11-18 | End: 2024-11-18 | Stop reason: HOSPADM

## 2024-11-18 RX ORDER — SODIUM CHLORIDE 9 MG/ML
INJECTION, SOLUTION INTRAVENOUS CONTINUOUS
Status: DISCONTINUED | OUTPATIENT
Start: 2024-11-18 | End: 2024-11-20

## 2024-11-18 RX ORDER — CALCIUM GLUCONATE 20 MG/ML
1 INJECTION, SOLUTION INTRAVENOUS
Status: DISPENSED | OUTPATIENT
Start: 2024-11-18 | End: 2024-11-26

## 2024-11-18 RX ORDER — PHENYLEPHRINE HCL IN 0.9% NACL 50MG/250ML
.1-4 PLASTIC BAG, INJECTION (ML) INTRAVENOUS CONTINUOUS PRN
Status: DISCONTINUED | OUTPATIENT
Start: 2024-11-18 | End: 2024-11-20

## 2024-11-18 RX ORDER — ONDANSETRON 2 MG/ML
4 INJECTION INTRAMUSCULAR; INTRAVENOUS EVERY 6 HOURS PRN
Status: DISCONTINUED | OUTPATIENT
Start: 2024-11-18 | End: 2024-11-26 | Stop reason: HOSPADM

## 2024-11-18 RX ORDER — CHLORHEXIDINE GLUCONATE ORAL RINSE 1.2 MG/ML
15 SOLUTION DENTAL 2 TIMES DAILY
Status: DISCONTINUED | OUTPATIENT
Start: 2024-11-18 | End: 2024-11-18

## 2024-11-18 RX ORDER — CHLORHEXIDINE GLUCONATE ORAL RINSE 1.2 MG/ML
10 SOLUTION DENTAL ONCE
Status: DISCONTINUED | OUTPATIENT
Start: 2024-11-18 | End: 2024-11-18 | Stop reason: HOSPADM

## 2024-11-18 RX ORDER — METHADONE HYDROCHLORIDE 10 MG/ML
INJECTION, SOLUTION INTRAMUSCULAR; INTRAVENOUS; SUBCUTANEOUS
Status: DISCONTINUED
Start: 2024-11-18 | End: 2024-11-18 | Stop reason: WASHOUT

## 2024-11-18 RX ORDER — AMOXICILLIN 250 MG
1 CAPSULE ORAL 2 TIMES DAILY
Status: DISCONTINUED | OUTPATIENT
Start: 2024-11-18 | End: 2024-11-26 | Stop reason: HOSPADM

## 2024-11-18 RX ORDER — MAGNESIUM SULFATE 4 G/50ML
4 INJECTION INTRAVENOUS ONCE
Status: DISCONTINUED | OUTPATIENT
Start: 2024-11-18 | End: 2024-11-18 | Stop reason: HOSPADM

## 2024-11-18 RX ORDER — HEPARIN SODIUM 1000 [USP'U]/ML
INJECTION, SOLUTION INTRAVENOUS; SUBCUTANEOUS PRN
Status: DISCONTINUED | OUTPATIENT
Start: 2024-11-18 | End: 2024-11-18

## 2024-11-18 RX ORDER — ASPIRIN 300 MG/1
300 SUPPOSITORY RECTAL DAILY
Status: DISCONTINUED | OUTPATIENT
Start: 2024-11-19 | End: 2024-11-26

## 2024-11-18 RX ORDER — ONDANSETRON 2 MG/ML
INJECTION INTRAMUSCULAR; INTRAVENOUS PRN
Status: DISCONTINUED | OUTPATIENT
Start: 2024-11-18 | End: 2024-11-18

## 2024-11-18 RX ORDER — CLINDAMYCIN PHOSPHATE 900 MG/50ML
900 INJECTION, SOLUTION INTRAVENOUS SEE ADMIN INSTRUCTIONS
Status: DISCONTINUED | OUTPATIENT
Start: 2024-11-18 | End: 2024-11-18 | Stop reason: HOSPADM

## 2024-11-18 RX ORDER — ONDANSETRON 4 MG/1
4 TABLET, ORALLY DISINTEGRATING ORAL EVERY 6 HOURS PRN
Status: DISCONTINUED | OUTPATIENT
Start: 2024-11-18 | End: 2024-11-26 | Stop reason: HOSPADM

## 2024-11-18 RX ORDER — ACETAMINOPHEN 325 MG/1
975 TABLET ORAL ONCE
Status: DISCONTINUED | OUTPATIENT
Start: 2024-11-18 | End: 2024-11-18 | Stop reason: HOSPADM

## 2024-11-18 RX ORDER — ASPIRIN 300 MG/1
300 SUPPOSITORY RECTAL ONCE
Status: COMPLETED | OUTPATIENT
Start: 2024-11-18 | End: 2024-11-18

## 2024-11-18 RX ORDER — LIDOCAINE 4 G/G
1-2 PATCH TOPICAL EVERY 24 HOURS
Status: DISCONTINUED | OUTPATIENT
Start: 2024-11-18 | End: 2024-11-26 | Stop reason: HOSPADM

## 2024-11-18 RX ORDER — ASPIRIN 81 MG/1
162 TABLET, CHEWABLE ORAL
Status: DISCONTINUED | OUTPATIENT
Start: 2024-11-18 | End: 2024-11-18 | Stop reason: HOSPADM

## 2024-11-18 RX ORDER — NOREPINEPHRINE BITARTRATE 0.02 MG/ML
.01-.1 INJECTION, SOLUTION INTRAVENOUS CONTINUOUS
Status: DISCONTINUED | OUTPATIENT
Start: 2024-11-18 | End: 2024-11-18 | Stop reason: HOSPADM

## 2024-11-18 RX ORDER — NICOTINE POLACRILEX 4 MG
15-30 LOZENGE BUCCAL
Status: DISCONTINUED | OUTPATIENT
Start: 2024-11-18 | End: 2024-11-26 | Stop reason: HOSPADM

## 2024-11-18 RX ORDER — CHLORHEXIDINE GLUCONATE ORAL RINSE 1.2 MG/ML
15 SOLUTION DENTAL ONCE
Status: DISCONTINUED | OUTPATIENT
Start: 2024-11-18 | End: 2024-11-18

## 2024-11-18 RX ORDER — LIDOCAINE 40 MG/G
CREAM TOPICAL
Status: DISCONTINUED | OUTPATIENT
Start: 2024-11-18 | End: 2024-11-18 | Stop reason: HOSPADM

## 2024-11-18 RX ORDER — LIDOCAINE HYDROCHLORIDE 10 MG/ML
INJECTION, SOLUTION INFILTRATION; PERINEURAL
Status: COMPLETED | OUTPATIENT
Start: 2024-11-18 | End: 2024-11-18

## 2024-11-18 RX ORDER — METHADONE HYDROCHLORIDE 10 MG/ML
INJECTION, SOLUTION INTRAMUSCULAR; INTRAVENOUS; SUBCUTANEOUS PRN
Status: DISCONTINUED | OUTPATIENT
Start: 2024-11-18 | End: 2024-11-18

## 2024-11-18 RX ORDER — FENTANYL CITRATE 50 UG/ML
25-100 INJECTION, SOLUTION INTRAMUSCULAR; INTRAVENOUS
Status: DISCONTINUED | OUTPATIENT
Start: 2024-11-18 | End: 2024-11-18 | Stop reason: HOSPADM

## 2024-11-18 RX ORDER — HYDROMORPHONE HCL IN WATER/PF 6 MG/30 ML
0.2 PATIENT CONTROLLED ANALGESIA SYRINGE INTRAVENOUS
Status: DISCONTINUED | OUTPATIENT
Start: 2024-11-18 | End: 2024-11-22

## 2024-11-18 RX ORDER — HYDROMORPHONE HCL IN WATER/PF 6 MG/30 ML
0.4 PATIENT CONTROLLED ANALGESIA SYRINGE INTRAVENOUS
Status: DISCONTINUED | OUTPATIENT
Start: 2024-11-18 | End: 2024-11-22

## 2024-11-18 RX ORDER — ACETAMINOPHEN 650 MG/1
650 SUPPOSITORY RECTAL EVERY 8 HOURS
Status: COMPLETED | OUTPATIENT
Start: 2024-11-18 | End: 2024-11-21

## 2024-11-18 RX ORDER — SODIUM CHLORIDE 9 MG/ML
INJECTION, SOLUTION INTRAVENOUS CONTINUOUS PRN
Status: DISCONTINUED | OUTPATIENT
Start: 2024-11-18 | End: 2024-11-18

## 2024-11-18 RX ORDER — ACETAMINOPHEN 325 MG/1
975 TABLET ORAL EVERY 8 HOURS
Status: DISCONTINUED | OUTPATIENT
Start: 2024-11-18 | End: 2024-11-18

## 2024-11-18 RX ORDER — OXYCODONE HYDROCHLORIDE 5 MG/1
5 TABLET ORAL EVERY 4 HOURS PRN
Status: DISCONTINUED | OUTPATIENT
Start: 2024-11-18 | End: 2024-11-26 | Stop reason: HOSPADM

## 2024-11-18 RX ORDER — ASPIRIN 81 MG/1
81 TABLET, CHEWABLE ORAL
Status: DISCONTINUED | OUTPATIENT
Start: 2024-11-18 | End: 2024-11-18 | Stop reason: HOSPADM

## 2024-11-18 RX ORDER — FLUMAZENIL 0.1 MG/ML
0.2 INJECTION, SOLUTION INTRAVENOUS
Status: DISCONTINUED | OUTPATIENT
Start: 2024-11-18 | End: 2024-11-18 | Stop reason: HOSPADM

## 2024-11-18 RX ORDER — FINASTERIDE 5 MG/1
5 TABLET, FILM COATED ORAL DAILY
Status: DISCONTINUED | OUTPATIENT
Start: 2024-11-19 | End: 2024-11-26 | Stop reason: HOSPADM

## 2024-11-18 RX ORDER — HYDRALAZINE HYDROCHLORIDE 20 MG/ML
10 INJECTION INTRAMUSCULAR; INTRAVENOUS EVERY 30 MIN PRN
Status: DISCONTINUED | OUTPATIENT
Start: 2024-11-18 | End: 2024-11-23

## 2024-11-18 RX ORDER — GABAPENTIN 300 MG/1
300 CAPSULE ORAL 3 TIMES DAILY PRN
Status: DISCONTINUED | OUTPATIENT
Start: 2024-11-19 | End: 2024-11-26 | Stop reason: HOSPADM

## 2024-11-18 RX ORDER — PHENYLEPHRINE HCL IN 0.9% NACL 50MG/250ML
.1-6 PLASTIC BAG, INJECTION (ML) INTRAVENOUS CONTINUOUS
Status: DISCONTINUED | OUTPATIENT
Start: 2024-11-18 | End: 2024-11-18 | Stop reason: HOSPADM

## 2024-11-18 RX ORDER — ACETAMINOPHEN 325 MG/1
650 TABLET ORAL EVERY 4 HOURS PRN
Status: DISCONTINUED | OUTPATIENT
Start: 2024-11-21 | End: 2024-11-26 | Stop reason: HOSPADM

## 2024-11-18 RX ORDER — NALOXONE HYDROCHLORIDE 0.4 MG/ML
0.2 INJECTION, SOLUTION INTRAMUSCULAR; INTRAVENOUS; SUBCUTANEOUS
Status: DISCONTINUED | OUTPATIENT
Start: 2024-11-18 | End: 2024-11-26 | Stop reason: HOSPADM

## 2024-11-18 RX ORDER — SODIUM BICARBONATE 650 MG/1
1950 TABLET ORAL 3 TIMES DAILY
Status: DISCONTINUED | OUTPATIENT
Start: 2024-11-20 | End: 2024-11-26 | Stop reason: HOSPADM

## 2024-11-18 RX ORDER — DEXMEDETOMIDINE HYDROCHLORIDE 4 UG/ML
.2-1.2 INJECTION, SOLUTION INTRAVENOUS CONTINUOUS
Status: DISCONTINUED | OUTPATIENT
Start: 2024-11-18 | End: 2024-11-18 | Stop reason: HOSPADM

## 2024-11-18 RX ORDER — CALCIUM CARBONATE 500 MG/1
1000 TABLET, CHEWABLE ORAL 3 TIMES DAILY PRN
Status: DISCONTINUED | OUTPATIENT
Start: 2024-11-18 | End: 2024-11-26 | Stop reason: HOSPADM

## 2024-11-18 RX ORDER — DEXMEDETOMIDINE HYDROCHLORIDE 4 UG/ML
.1-1.2 INJECTION, SOLUTION INTRAVENOUS CONTINUOUS
Status: DISCONTINUED | OUTPATIENT
Start: 2024-11-18 | End: 2024-11-20

## 2024-11-18 RX ORDER — FERROUS GLUCONATE 324(38)MG
324 TABLET ORAL DAILY
Status: DISCONTINUED | OUTPATIENT
Start: 2024-11-19 | End: 2024-11-26 | Stop reason: HOSPADM

## 2024-11-18 RX ORDER — PANTOPRAZOLE SODIUM 40 MG/1
40 TABLET, DELAYED RELEASE ORAL
Status: DISCONTINUED | OUTPATIENT
Start: 2024-11-19 | End: 2024-11-26 | Stop reason: HOSPADM

## 2024-11-18 RX ORDER — ACETAMINOPHEN 325 MG/1
975 TABLET ORAL EVERY 8 HOURS
Status: COMPLETED | OUTPATIENT
Start: 2024-11-18 | End: 2024-11-21

## 2024-11-18 RX ORDER — CHLORHEXIDINE GLUCONATE ORAL RINSE 1.2 MG/ML
10 SOLUTION DENTAL ONCE
Status: COMPLETED | OUTPATIENT
Start: 2024-11-18 | End: 2024-11-18

## 2024-11-18 RX ORDER — MORPHINE SULFATE 1 MG/ML
150 INJECTION, SOLUTION EPIDURAL; INTRATHECAL; INTRAVENOUS ONCE
Status: DISCONTINUED | OUTPATIENT
Start: 2024-11-18 | End: 2024-11-18 | Stop reason: HOSPADM

## 2024-11-18 RX ORDER — HEPARIN SODIUM 5000 [USP'U]/.5ML
5000 INJECTION, SOLUTION INTRAVENOUS; SUBCUTANEOUS EVERY 8 HOURS
Status: DISCONTINUED | OUTPATIENT
Start: 2024-11-19 | End: 2024-11-26 | Stop reason: HOSPADM

## 2024-11-18 RX ORDER — LIDOCAINE HYDROCHLORIDE 10 MG/ML
INJECTION, SOLUTION INFILTRATION; PERINEURAL PRN
Status: DISCONTINUED | OUTPATIENT
Start: 2024-11-18 | End: 2024-11-18

## 2024-11-18 RX ORDER — CLINDAMYCIN PHOSPHATE 900 MG/50ML
900 INJECTION, SOLUTION INTRAVENOUS
Status: DISCONTINUED | OUTPATIENT
Start: 2024-11-18 | End: 2024-11-18 | Stop reason: HOSPADM

## 2024-11-18 RX ORDER — SODIUM CHLORIDE, SODIUM LACTATE, POTASSIUM CHLORIDE, CALCIUM CHLORIDE 600; 310; 30; 20 MG/100ML; MG/100ML; MG/100ML; MG/100ML
INJECTION, SOLUTION INTRAVENOUS CONTINUOUS PRN
Status: DISCONTINUED | OUTPATIENT
Start: 2024-11-18 | End: 2024-11-18

## 2024-11-18 RX ORDER — BISACODYL 10 MG
10 SUPPOSITORY, RECTAL RECTAL DAILY PRN
Status: DISCONTINUED | OUTPATIENT
Start: 2024-11-21 | End: 2024-11-26 | Stop reason: HOSPADM

## 2024-11-18 RX ORDER — NALOXONE HYDROCHLORIDE 1 MG/ML
0.1 INJECTION INTRAMUSCULAR; INTRAVENOUS; SUBCUTANEOUS
Status: DISCONTINUED | OUTPATIENT
Start: 2024-11-18 | End: 2024-11-18 | Stop reason: HOSPADM

## 2024-11-18 RX ORDER — CLINDAMYCIN PHOSPHATE 900 MG/50ML
900 INJECTION, SOLUTION INTRAVENOUS EVERY 8 HOURS
Status: COMPLETED | OUTPATIENT
Start: 2024-11-18 | End: 2024-11-19

## 2024-11-18 RX ORDER — METHADONE HYDROCHLORIDE 10 MG/ML
INJECTION, SOLUTION INTRAMUSCULAR; INTRAVENOUS; SUBCUTANEOUS
Status: DISCONTINUED
Start: 2024-11-18 | End: 2024-11-18 | Stop reason: HOSPADM

## 2024-11-18 RX ORDER — POLYETHYLENE GLYCOL 3350 17 G/17G
17 POWDER, FOR SOLUTION ORAL DAILY
Status: DISCONTINUED | OUTPATIENT
Start: 2024-11-19 | End: 2024-11-26 | Stop reason: HOSPADM

## 2024-11-18 RX ORDER — OXYCODONE HYDROCHLORIDE 5 MG/1
10 TABLET ORAL EVERY 4 HOURS PRN
Status: DISCONTINUED | OUTPATIENT
Start: 2024-11-18 | End: 2024-11-26 | Stop reason: HOSPADM

## 2024-11-18 RX ADMIN — Medication 10 MG: at 12:13

## 2024-11-18 RX ADMIN — ASPIRIN 300 MG: 300 SUPPOSITORY RECTAL at 18:26

## 2024-11-18 RX ADMIN — ETOMIDATE 16 MG: 2 INJECTION, SOLUTION INTRAVENOUS at 11:24

## 2024-11-18 RX ADMIN — Medication 10 MCG/HR: at 18:22

## 2024-11-18 RX ADMIN — LIDOCAINE HYDROCHLORIDE 2 ML: 10 INJECTION, SOLUTION INFILTRATION; PERINEURAL at 11:00

## 2024-11-18 RX ADMIN — PHENYLEPHRINE HYDROCHLORIDE 100 MCG: 10 INJECTION INTRAVENOUS at 12:42

## 2024-11-18 RX ADMIN — CALCIUM GLUCONATE 1 G: 20 INJECTION, SOLUTION INTRAVENOUS at 19:28

## 2024-11-18 RX ADMIN — ALBUMIN HUMAN 12.5 G: 0.05 INJECTION, SOLUTION INTRAVENOUS at 21:32

## 2024-11-18 RX ADMIN — SODIUM CHLORIDE 1500 MG: 9 INJECTION, SOLUTION INTRAVENOUS at 22:52

## 2024-11-18 RX ADMIN — HEPARIN SODIUM 40000 UNITS: 1000 INJECTION INTRAVENOUS; SUBCUTANEOUS at 12:44

## 2024-11-18 RX ADMIN — ACETAMINOPHEN 650 MG: 650 SUPPOSITORY RECTAL at 19:34

## 2024-11-18 RX ADMIN — CLINDAMYCIN PHOSPHATE 900 MG: 900 INJECTION, SOLUTION INTRAVENOUS at 18:23

## 2024-11-18 RX ADMIN — ETOMIDATE 4 MG: 2 INJECTION, SOLUTION INTRAVENOUS at 11:12

## 2024-11-18 RX ADMIN — DEXMEDETOMIDINE HYDROCHLORIDE 0.5 MCG/KG/HR: 400 INJECTION INTRAVENOUS at 18:19

## 2024-11-18 RX ADMIN — MAGNESIUM SULFATE HEPTAHYDRATE 2 G: 40 INJECTION, SOLUTION INTRAVENOUS at 00:23

## 2024-11-18 RX ADMIN — PHENYLEPHRINE HYDROCHLORIDE 200 MCG: 10 INJECTION INTRAVENOUS at 11:37

## 2024-11-18 RX ADMIN — DEXMEDETOMIDINE HYDROCHLORIDE 0.5 MCG/KG/HR: 400 INJECTION INTRAVENOUS at 11:56

## 2024-11-18 RX ADMIN — CHLORHEXIDINE GLUCONATE 15 ML: 1.2 SOLUTION ORAL at 22:00

## 2024-11-18 RX ADMIN — NICARDIPINE HYDROCHLORIDE 0.25 MG/HR: 0.2 INJECTION, SOLUTION INTRAVENOUS at 22:35

## 2024-11-18 RX ADMIN — PHENYLEPHRINE HYDROCHLORIDE 200 MCG: 10 INJECTION INTRAVENOUS at 12:45

## 2024-11-18 RX ADMIN — ALBUMIN HUMAN 12.5 G: 0.05 INJECTION, SOLUTION INTRAVENOUS at 19:46

## 2024-11-18 RX ADMIN — MIDAZOLAM HYDROCHLORIDE 2 MG: 1 INJECTION, SOLUTION INTRAMUSCULAR; INTRAVENOUS at 11:11

## 2024-11-18 RX ADMIN — ASPIRIN 81 MG CHEWABLE TABLET 162 MG: 81 TABLET CHEWABLE at 07:30

## 2024-11-18 RX ADMIN — PHENYLEPHRINE HYDROCHLORIDE 200 MCG: 10 INJECTION INTRAVENOUS at 11:35

## 2024-11-18 RX ADMIN — ALBUMIN HUMAN: 0.05 INJECTION, SOLUTION INTRAVENOUS at 14:44

## 2024-11-18 RX ADMIN — PROTAMINE SULFATE 50 MG: 10 INJECTION, SOLUTION INTRAVENOUS at 15:26

## 2024-11-18 RX ADMIN — NOREPINEPHRINE BITARTRATE 0.01 MCG/KG/MIN: 0.02 INJECTION, SOLUTION INTRAVENOUS at 12:06

## 2024-11-18 RX ADMIN — COAGULATION FACTOR VIIA (RECOMBINANT) 0.5 MG: KIT at 15:59

## 2024-11-18 RX ADMIN — SODIUM CHLORIDE: 9 INJECTION, SOLUTION INTRAVENOUS at 11:56

## 2024-11-18 RX ADMIN — Medication 1500 MG: at 11:41

## 2024-11-18 RX ADMIN — ALBUMIN HUMAN: 0.05 INJECTION, SOLUTION INTRAVENOUS at 15:02

## 2024-11-18 RX ADMIN — LIDOCAINE HYDROCHLORIDE 50 MG: 10 INJECTION, SOLUTION INFILTRATION; PERINEURAL at 11:24

## 2024-11-18 RX ADMIN — SODIUM BICARBONATE 50 MEQ: 84 INJECTION, SOLUTION INTRAVENOUS at 15:05

## 2024-11-18 RX ADMIN — ALBUMIN HUMAN 12.5 G: 0.05 INJECTION, SOLUTION INTRAVENOUS at 20:38

## 2024-11-18 RX ADMIN — POLYPROPYLENE GLYCOL 400, PROPYLENE GLYCOL 1 DROP: .4; .3 LIQUID OPHTHALMIC at 08:07

## 2024-11-18 RX ADMIN — ROCURONIUM BROMIDE 50 MG: 50 INJECTION, SOLUTION INTRAVENOUS at 13:03

## 2024-11-18 RX ADMIN — SODIUM CHLORIDE, POTASSIUM CHLORIDE, SODIUM LACTATE AND CALCIUM CHLORIDE: 600; 310; 30; 20 INJECTION, SOLUTION INTRAVENOUS at 11:03

## 2024-11-18 RX ADMIN — PROTAMINE SULFATE 150 MG: 10 INJECTION, SOLUTION INTRAVENOUS at 14:42

## 2024-11-18 RX ADMIN — ROCURONIUM BROMIDE 100 MG: 50 INJECTION, SOLUTION INTRAVENOUS at 11:24

## 2024-11-18 RX ADMIN — NOREPINEPHRINE BITARTRATE 0.03 MCG/KG/MIN: 0.02 INJECTION, SOLUTION INTRAVENOUS at 11:34

## 2024-11-18 RX ADMIN — NITROGLYCERIN 20 MCG: 10 INJECTION INTRAVENOUS at 13:07

## 2024-11-18 RX ADMIN — Medication 10 MG: at 11:24

## 2024-11-18 RX ADMIN — ALBUMIN HUMAN: 0.05 INJECTION, SOLUTION INTRAVENOUS at 14:49

## 2024-11-18 RX ADMIN — ONDANSETRON 4 MG: 2 INJECTION INTRAMUSCULAR; INTRAVENOUS at 16:13

## 2024-11-18 RX ADMIN — ALBUMIN HUMAN: 0.05 INJECTION, SOLUTION INTRAVENOUS at 15:42

## 2024-11-18 RX ADMIN — LIDOCAINE 2 PATCH: 4 PATCH TOPICAL at 18:52

## 2024-11-18 RX ADMIN — METOPROLOL TARTRATE 12.5 MG: 25 TABLET, FILM COATED ORAL at 07:30

## 2024-11-18 RX ADMIN — NITROGLYCERIN 20 MCG: 10 INJECTION INTRAVENOUS at 13:09

## 2024-11-18 RX ADMIN — PROTAMINE SULFATE 250 MG: 10 INJECTION, SOLUTION INTRAVENOUS at 14:44

## 2024-11-18 RX ADMIN — PHENYLEPHRINE HYDROCHLORIDE 200 MCG: 10 INJECTION INTRAVENOUS at 12:47

## 2024-11-18 RX ADMIN — NITROGLYCERIN 40 MCG/MIN: 20 INJECTION INTRAVENOUS at 10:02

## 2024-11-18 ASSESSMENT — ACTIVITIES OF DAILY LIVING (ADL)
ADLS_ACUITY_SCORE: 26.75
ADLS_ACUITY_SCORE: 25.25
ADLS_ACUITY_SCORE: 26.75
ADLS_ACUITY_SCORE: 25.25
ADLS_ACUITY_SCORE: 26.75
ADLS_ACUITY_SCORE: 0
ADLS_ACUITY_SCORE: 26.75
ADLS_ACUITY_SCORE: 27.25
ADLS_ACUITY_SCORE: 26.75
ADLS_ACUITY_SCORE: 27.25
ADLS_ACUITY_SCORE: 26.75
ADLS_ACUITY_SCORE: 26.75
ADLS_ACUITY_SCORE: 25.25
ADLS_ACUITY_SCORE: 26.75
ADLS_ACUITY_SCORE: 26.75
ADLS_ACUITY_SCORE: 25.25
ADLS_ACUITY_SCORE: 26.75
ADLS_ACUITY_SCORE: 25.25

## 2024-11-18 NOTE — OP NOTE
OPERATIVE REPORT     OPERATING ROOM:  Room 1    November 18, 2024    PROCEDURES PERFORMED:   Median sternotomy   Take down of the left internal mammary artery    Endoscopic greater saphenous vein procurement from the  left lower extremity    Epiaortic ultrasound of the ascending aorta    Placement on central cardiopulmonary bypass    Triple vessel coronary artery bypass grafting;   -  Left internal mammary artery to the left anterior descending coronary artery  -  Separate reversed saphenous vein grafts to ramus intermedius and the largest distal obtuse marginal    Placement of temporary atrial and ventricular pacing wires     SURGEONS:    Attending Surgeon:  Tsering Evans MD  First Assistant: Felicity Kent. STSAC  Physician Assistant:  Rachel Pearl PA-C     ANESTHESIA:  General endotracheal    SKIN PREP:  Betadine and Duraprep    INCISION:  Median sternotomy, skip incision left leg     DRAINS: Two 32 Fr mediastinal and one 24 Fr Trevon drain right and left pleural spaces  CULTURES: None  SPECIMENS: None  CLOSURE: Routine     PREOPERATIVE DIAGNOSES:  S/P STEMI  Unstable angina  Left main and RCA coronary artery disease  Ischemic cardiomyopathy  Chronic kidney disease  Peripheral vascular disease         POSTOPERATIVE DIAGNOSES:  Same     BRIEF HISTORY:    Lance Ibrahim is a 79 year old year old gentleman who presented with a STEMI last week.  Subsequent coronary angiography revealed very tight left main disease and a totally occluded RCA.  Over the weekend he had chest pain at rest and an intra-aortic balloon pump was placed.  The above procedures were planned.     FINDINGS AT OPERATION:  His ascending aorta had a great deal of palpable plaque and this was confirmed on epiaortic ultrasound.  The areas were marked and avoided.  He had a large right pleural effusion.  His pulmonary artery pressures were elevated and his right heart was enlarged.  He had left ventricular hypertrophy and a reduced left ventricular  function with an LVEF of 25% and this improved to 40% postoperatively.  The left internal mammary artery was a 2.5 mm in diameter conduit with excellent flow.  The reversed vein graft measured 4 to 5 mm in diameter and was of good quality.  The left anterior descending coronary artery in its apical third was a 2 mm in diameter target vessel, an excellent vessel for bypass grafting.  The ramus intermedius was a 1.5 mm in diameter target vessel, a good vessel for bypass grafting.  The most distal obtuse marginal was a 2 mm in diameter target vessel, an excellent vessel for bypass grafting.      PROCEDURES:  The patient arrived in the operating room and was positioned supine.  An arterial line was placed.  Satisfactory general endotracheal anesthesia was induced.  A transesophageal probe, [place] catheter and Barnes catheter were inserted.  An intra-aortic balloon pump was in place.  The patient's neck, chest, abdomen, both groins and lower extremities were prepped and draped in a standard sterile fashion.      A complete median sternotomy was made.  With the aid of the Manishract retractor, the left internal mammary artery was taken down from the xiphoid process to the subclavian vein.   At the same time Rachel Pearl made an incision in the left upper leg and 5 cm of the greater saphenous vein was exposed. The endoscope was then passed proximally and distally and the greater saphenous vein was dissected out circumferentially.  Its branches were clipped or cauterized.  It was clipped proximally and distally and extracted. It was cannulated and distended.  Its branches were controlled with Ligaclips.  The leg wound was rendered hemostatic and closed in layers using running 2-0 and 3-0 Vicryl.  Dermabond was applied to the skin.     Heparin was administered.  The left internal mammary artery was prepared in the usual fashion.  A Angeles retractor was inserted.  The pericardium was opened and tented up on pericardial  sutures.  The aorta was  from the pulmonary artery.  Epiaortic ultrasound of the ascending aorta was carried out.  Areas of plaque were marked.  Pursestring sutures were placed in the transverse arch of the aorta and right atrium for cannulation.  The intra-aortic balloon pump was turned off. When the ACT was appropriate cannulation was performed and central cardiopulmonary bypass was established.  The patient's temperature was allowed to drift.  A retrograde cardioplegia catheter was placed in the coronary sinus via the right atrium.  The aorta was cross-clamped and 500 mL of cold blood cardioplegia was administered antegrade and an additional 1000 mL of cold blood cardioplegia was administered retrograde.  A good arrest was achieved.  Cold topical saline and slush was applied to the heart intermittently during the period of aortic cross-clamp.      Attention was first turned to the most distal obtuse marginal coronary artery.   It was dissected out for a distance of 1 cm and then opened for a distance of 8 mm.  It was bypassed in an end to side fashion using reversed saphenous vein graft and running 7-0 Prolene.  The vein graft was flushed with cold blood cardioplegia and sized to the ascending aorta.  The patient received 400 mL of cold blood cardioplegia in a retrograde fashion. Next attention was turned to the ramus intermedius coronary artery.  It was dissected out for a distance of 1 cm and then opened for a distance of 8 mm.  It was bypassed in an end to side fashion using reversed saphenous vein graft and running 7-0 Prolene.  The vein graft was flushed with cold blood cardioplegia and the patient received another 400 mL of cold blood cardioplegia in a retrograde fashion. Finally attention was turned to the left anterior descending coronary artery in the interventricular groove.  It was dissected out in its apical third for a distance of 1 cm and then opened for a distance of 8 mm.  A pleural rent  was created for passage of the left internal mammary artery and then the final distal anastomosis was performed between the left internal mammary artery and the left anterior descending coronary artery in an end to side fashion using running 7-0 Prolene. As this last distal anastomosis was being performed gentle warming was begun.  The gray occluder was briefly released from the left internal mammary artery and good flow was seen down the left anterior descending coronary artery.  The gray occluder was once more applied to the left internal mammary artery and the patient received a final dose of cold blood cardioplegia in a retrograde fashion.  It had been decided to perform the 1 proximal anastomosis with the aortic cross-clamp in place.  Therefore 1, 4 mm punch aortotomies was created.  The proximal aortosaphenous anastomosis to the most distal obtuse marginal was performed in an end to side fashion using running 6-0 Prolene.     The gray occluder was released from the left internal mammary artery and a hot shot was delivered in a retrograde fashion.  The aortic cross-clamp was released.  The aortic cross-clamp time was 59 minutes.  The patient was defibrillated once.  Ventricular and atrial pacing wires were applied and the patient was A-V sequentially paced at a rate of 90.  The proximal to the ramus intermedius came off the vein graft to the most distal obtuse marginal coronary artery in an end to side fashion using running 6-0 Prolene.  The retrograde cardioplegia catheter was removed and that site repaired with two sets of 4-0 Prolene.  The left and right pleural spaces were drained of any accumulated blood and gentle ventilation resumed. The root vent was removed and that site repaired with two sets of plegetted 4-0 Prolene.      The patient was placed on moderate doses of epinephrine and levophed.  When he was warm the heart was allowed to fill and eject and the patient  from cardiopulmonary bypass  without difficulty. Total time on cardiopulmonary bypass was  1 hour and 26 minutes. The intra-aortic balloon pump was placed on 1:1 half augmentation.   Protamine was administered to reverse the heparin.  The patient was decannulated and the cannulation sites were repaired with 4-0 Prolene.  Hemostasis was satisfactory.    The drains were placed and secured to the skin. The sternum was approximated with a combination of single and double stainless steel sternal wires.   The sternal wound was irrigated with warm antibiotic-containing solution.  It was closed in 4 layers using 0 Stratafix for 2 layers, 2-0 Stratafix for the next layer and a subcuticular stitch of 4-0 Monocryl.      The sponge, needle and instrument counts were reported as correct.      The estimated blood loss was 700 mL.      Lance Ibrahim was brought to the Intensive Care Unit in critical but stable condition.    Complications: None     Tsering Evans MD on 11/18/2024 at 4:03 PM

## 2024-11-18 NOTE — PLAN OF CARE
[facility] - ICU    RN Progress Note:            Pertinent Assessments:      Please refer to flowsheet rows for full assessment     Patient alert and oriented x 4, calm, pleasant, cooperative. NPO post MN, O2 down to 3L/ oxymask. Patient is a mouth breather when sleeping. SR with BBB and 1st AVB, slight ST elevation in V2. Nitroglycerin and heparin gtts running. Patient denies chest pain. Voiding well. Pre op CV surgical prep mostly done. Pre op checklist mostly filled out.          Key Events - This Shift:     Please see above notes.           Barriers to Discharge / Downgrade:   For CABG this am.

## 2024-11-18 NOTE — ANESTHESIA PREPROCEDURE EVALUATION
Anesthesia Pre-Procedure Evaluation    Patient: Lance Ibrahim   MRN: [id number] : 1945        Procedure : Procedure(s):  CORONARY ARTERY BYPASS GRAFT TIMES THREE, WITH LEFT INTERNAL MAMARARY ARTERY HARVEST, LEFT ENDOSCOPIC VESSEL PROCUREMENT, EPIAORTIC ULTRASOUND  ECHOCARDIOGRAM, TRANSESOPHAGEAL, INTRAOPERATIVE          Past Medical History:   Diagnosis Date    Anemia     Arthritis     osteoarthritis knees    BPH     CAD (coronary artery disease)     subtotal occlusion in the small distal LAD     Cardiomyopathy     Cerebral artery occlusion with cerebral infarction     TIA , no residual    Cervicalgia     CHF (congestive heart failure) (H)     Chronic kidney disease     Chronic low back pain     Chronic rhinitis     Gouty arthropathy     hands    Hyperlipidemia     Hypertension     Kidney stone     Nocturia     Obesity     Osteomyelitis (H) 2023    Foot    PVD (peripheral vascular disease)     Sleep apnea     doesn't use cpap    Spinal cord stimulator status 2024    Spinal cord stimulator in place    TIA (transient ischaemic attack)     Type 2 diabetes mellitus - 2018    Ureteral stone       Past Surgical History:   Procedure Laterality Date    AMPUTATE FOOT Right 2023    Procedure: AMPUTATION, right hallux;  Surgeon: Nakul Salazar DPM;  Location: [facility] Main OR    AMPUTATE TOE(S) Left 10/15/2018    Procedure: AMPUTATE TOE(S);  Left third toe amputation;  Surgeon: Ayaka Azevedo DPM, Podiatry/Foot and Ankle Surgery;  Location:  OR    ARTHROPLASTY KNEE Right 2018    Procedure: Right total knee arthroplasty;  Surgeon: Issa Cunha MD;  Location:  OR    COLONOSCOPY  2013    Procedure: COLONOSCOPY;  COLONOSCOPY;  Surgeon: Chau Hogan MD;  Location:  GI    CV CORONARY ANGIOGRAM N/A 2024    Procedure: Coronary Angiogram;  Surgeon: Talon Lew MD;  Location: [facility] CATH LAB CV    CV HEART CATHETERIZATION WITH POSSIBLE INTERVENTION Left  03/05/2019    Procedure: Coronary Angiogram;  Surgeon: Nas Linda MD;  Location: [facility] CARDIAC CATH LAB    CV INTRA AORTIC BALLOON N/A 11/16/2024    Procedure: Intra aortic Balloon Pump Insertion;  Surgeon: Jessica Yepez MD;  Location: [facility] CV    CV INTRAVASULAR ULTRASOUND N/A 11/14/2024    Procedure: Intravascular Ultrasound;  Surgeon: Talon Lew MD;  Location: [facility] LAB CV    CV LEFT HEART CATH N/A 11/14/2024    Procedure: Left Heart Catheterization;  Surgeon: Talon Lew MD;  Location: [facility] LAB     Diastasis recti repair  1985    ENDOSCOPIC RETROGRADE CHOLANGIOPANCREATOGRAM N/A 04/30/2024    Procedure: ENDOSCOPIC RETROGRADE CHOLANGIOPANCREATOGRAPHY, BILIARY SPHINCTEROTOMY, DILATION, BRUSHING, BIOPSIES with DISTAL EXTRA HEPATIC BILE DUCT STRICTURE;  Surgeon: Aldo Gongora MD;  Location: [facility]    ESOPHAGOSCOPY, GASTROSCOPY, DUODENOSCOPY (EGD), COMBINED N/A 04/30/2024    Procedure: ESOPHAGOGASTRODUODENOSCOPY, WITH ENDOSCOPIC ULTRASOUND GUIDANCE;  Surgeon: Aldo Gongora MD;  Location: [facility] OR    EXTRACAPSULAR CATARACT EXTRATION WITH INTRAOCULAR LENS IMPLANT Left 03/13/2017    EXTRACAPSULAR CATARACT EXTRATION WITH INTRAOCULAR LENS IMPLANT Right     FOOT SURGERY  04/2013    cyst removal     HERNIA REPAIR  07/13/2004    ventral     IR DISC ASPIRATION INJECTION  6/19/2024    IRRIGATION AND DEBRIDEMENT SHOULDER, COMBINED Right 8/7/2024    Procedure: IRRIGATION AND DEBRIDEMENT, SHOULDER;  Surgeon: Terence Hanson MD;  Location: [facility] OR    IRRIGATION AND DEBRIDEMENT UPPER EXTREMITY, COMBINED Right 8/7/2024    Procedure: IRRIGATION AND DEBRIDEMENT, ELBOW AND THUMB;  Surgeon: Terence Hanson MD;  Location: [facility] OR    ORIF Shoulder Left     PROSTATE SURGERY  02/2024    Urolift    RESECT CLAVICLE DISTAL Right 8/7/2024    Procedure: EXCISION, CLAVICLE, DISTAL;  Surgeon: Terence Hanson,  "MD;  Location: [facility] Main OR    SPINAL CORD STIMULATOR IMPLANT  2024    Ventral hernia repair NOS  1987      Allergies   Allergen Reactions    Ancef [Cefazolin] Rash    Cephalexin Itching and Rash     Allergic reaction required hospitalization 2024    Penicillins Anaphylaxis    Sulfa Antibiotics      \"itchy rash, swelling of face and hands\"      Social History     Tobacco Use    Smoking status: Former     Current packs/day: 0.00     Types: Cigarettes     Quit date: 3/17/1993     Years since quittin.6     Passive exposure: Never    Smokeless tobacco: Never   Substance Use Topics    Alcohol use: Not Currently      Wt Readings from Last 1 Encounters:   24 92.4 kg (203 lb 11.2 oz)        Anesthesia Evaluation            ROS/MED HX  ENT/Pulmonary:     (+) sleep apnea,                                       Neurologic:     (+)              TIA,                  Cardiovascular:     (+)  hypertension- -  CAD -  - -      CHF                                METS/Exercise Tolerance:     Hematologic:       Musculoskeletal:       GI/Hepatic:       Renal/Genitourinary:     (+) renal disease,             Endo:     (+) type I DM,              Obesity,       Psychiatric/Substance Use:       Infectious Disease:       Malignancy:       Other:            Physical Exam    Airway        Mallampati: III   TM distance: > 3 FB   Neck ROM: full   Mouth opening: > 3 cm    Respiratory Devices and Support         Dental           Cardiovascular   cardiovascular exam normal          Pulmonary   pulmonary exam normal                OUTSIDE LABS:  CBC:   Lab Results   Component Value Date    WBC 17.6 (H) 2024    WBC 16.4 (H) 2024    HGB 10.8 (L) 2024    HGB 9.6 (L) 2024    HCT 33.4 (L) 2024    HCT 30.4 (L) 2024     2024     2024     BMP:   Lab Results   Component Value Date     2024     2024    POTASSIUM 5.1 2024    POTASSIUM 5.3 " 11/18/2024    CHLORIDE 101 11/18/2024    CHLORIDE 100 11/18/2024    CO2 25 11/18/2024    CO2 24 11/18/2024    BUN 23.4 (H) 11/18/2024    BUN 23.3 (H) 11/18/2024    CR 1.40 (H) 11/18/2024    CR 1.41 (H) 11/18/2024     (H) 11/18/2024     (H) 11/18/2024     COAGS:   Lab Results   Component Value Date    PTT 68 (H) 11/18/2024    INR 2.04 (H) 11/18/2024    FIBR 531 (H) 11/18/2024     POC:   Lab Results   Component Value Date    BGM 93 07/28/2020     HEPATIC:   Lab Results   Component Value Date    ALBUMIN 2.7 (L) 11/18/2024    PROTTOTAL 6.7 11/18/2024    ALT 26 11/18/2024    AST 45 11/18/2024    ALKPHOS 333 (H) 11/18/2024    BILITOTAL 1.0 11/18/2024     OTHER:   Lab Results   Component Value Date    PH 7.31 (L) 11/18/2024    LACT 1.8 11/18/2024    A1C 9.1 (H) 11/14/2024    SHANNAN 8.5 (L) 11/18/2024    PHOS 4.1 11/15/2024    MAG 2.5 (H) 11/18/2024    LIPASE 40 11/14/2024    TSH 4.28 (H) 11/14/2024    T4 1.43 11/14/2024    CRP 4.7 (H) 08/25/2022     (H) 08/09/2024       Anesthesia Plan    ASA Status:  4       Anesthesia Type: General.     - Airway: ETT   Induction: Intravenous.   Maintenance: Inhalation.   Techniques and Equipment:     - Lines/Monitors: SABRA     Consents    Anesthesia Plan(s) and associated risks, benefits, and realistic alternatives discussed. Questions answered and patient/representative(s) expressed understanding.     - Discussed: Risks, Benefits and Alternatives for the PROCEDURE were discussed     - Discussed with:  Patient      - Extended Intubation/Ventilatory Support Discussed: No.      - Patient is DNR/DNI Status: No     Use of blood products discussed: No .     Postoperative Care            Comments:               Joel Arias MD    I have reviewed the pertinent notes and labs in the chart from the past 30 days and (re)examined the patient.  Any updates or changes from those notes are reflected in this note.     # Hyponatremia: Lowest Na = 134 mmol/L in last 2 days, will  monitor as appropriate   # Hypocalcemia: Lowest iCa = 4.3 mg/dL in last 2 days, will monitor and replace as appropriate     # Hypoalbuminemia: Lowest albumin = 2.6 g/dL at 11/16/2024  4:06 AM, will monitor as appropriate  # Coagulation Defect: INR = 2.04 (Ref range: 0.85 - 1.15) and/or PTT = 68 Seconds (Ref range: 22 - 38 Seconds), will monitor for bleeding    # Hypertension: Noted on problem list  # Chronic heart failure with reduced ejection fraction: last echo with EF <40%    # Acute Hypercapnic Respiratory Failure: based on venous blood gas results.  Continue supplemental oxygen and ventilatory support as indicated.        # DMII: A1C = 9.1 % (Ref range: <5.7 %) within past 6 months   # Overweight: Estimated body mass index is 27.63 kg/m  as calculated from the following:    Height as of this encounter: 1.829 m (6').    Weight as of this encounter: 92.4 kg (203 lb 11.2 oz).      # Financial/Environmental Concerns:

## 2024-11-18 NOTE — ANESTHESIA PROCEDURE NOTES
Arterial Line Procedure Note    Pre-Procedure   Staff -        Performed By: anesthesiologist       Location: OR       Pre-Anesthestic Checklist: patient identified, IV checked, risks and benefits discussed, informed consent, monitors and equipment checked, pre-op evaluation and at physician/surgeon's request  Timeout:       Correct Patient: Yes        Correct Procedure: Yes        Correct Site: Yes        Correct Position: Yes   Line Placement:   This line was placed Pre Induction starting at 11/18/2024 11:00 AM and ending at 11/18/2024 11:05 AM  Procedure   Procedure: arterial line       Laterality: right       Insertion Site: radial.  Sterile Prep        Standard elements of sterile barrier followed       Skin prep: Chloraprep  Insertion/Injection        Technique: ultrasound guided and Seldinger Technique        1. Ultrasound was used to evaluate the access site.       2. Artery evaluated via ultrasound for patency/adequacy.       3. Using real-time ultrasound the needle/catheter was observed entering the artery/vein.       Catheter Type/Size: 20 G, 12 cm  Narrative         Secured by: suture       Tegaderm dressing used.       Complications: None apparent,        Arterial waveform: Yes        Doxepin Counseling:  Patient advised that the medication is sedating and not to drive a car after taking this medication. Patient informed of potential adverse effects including but not limited to dry mouth, urinary retention, and blurry vision.  The patient verbalized understanding of the proper use and possible adverse effects of doxepin.  All of the patient's questions and concerns were addressed.

## 2024-11-18 NOTE — ANESTHESIA POSTPROCEDURE EVALUATION
Patient: Lance Ibrahim    Procedure: Procedure(s):  CORONARY ARTERY BYPASS GRAFT TIMES THREE, WITH LEFT INTERNAL MAMARARY ARTERY HARVEST, LEFT ENDOSCOPIC VESSEL PROCUREMENT, EPIAORTIC ULTRASOUND  ECHOCARDIOGRAM, TRANSESOPHAGEAL, INTRAOPERATIVE       Anesthesia Type:  No value filed.    Note:  Disposition: ICU            ICU Sign Out: Anesthesiologist/ICU physician sign out WAS performed   Postop Pain Control: Uneventful            Sign Out: Well controlled pain   PONV: No   Neuro/Psych: Uneventful            Sign Out: Acceptable/Baseline neuro status   Airway/Respiratory: Uneventful            Sign Out: AIRWAY IN SITU/Resp. Support   CV/Hemodynamics: Uneventful            Sign Out: Acceptable CV status; No obvious hypovolemia; No obvious fluid overload   Other NRE: NONE   DID A NON-ROUTINE EVENT OCCUR? No           Last vitals:  Vitals:    11/18/24 1100 11/18/24 1613 11/18/24 1635   BP: (!) 143/88     Pulse: 76 89 89   Resp: 19 11    Temp:      SpO2: 97% 100% 100%       Electronically Signed By: Joel Arias MD  November 18, 2024  4:39 PM

## 2024-11-18 NOTE — PRE-PROCEDURE
Patient was seen and examined by me.  He has unstable angina and recently had a STEMI.  He has an ischemic cardiomyopathy and tight left main disease and a totally occluded RCA.  He required placement of an intra-aortic balloon pump on Saturday.  We plan to perform a multi-vessel coronary artery bypass grafting procedure and for conduit we will use the left internal mammary and reversed saphenous vein graft.  He understands that the risks for this procedure include: bleeding, infection, stroke, sternal dehiscence, myocardial infarction, arrhythmias, the need for a pacemaker, prolonged ventilation, pneumonia, liver/renal failure, aortic dissection, and an operative mortality of at least 4 to 8 percent.  He accepts these risks and is ready to proceed this morning.  Informed consent has been obtained.

## 2024-11-18 NOTE — ANESTHESIA CARE TRANSFER NOTE
Patient: Lance Ibrahim    Procedure: Procedure(s):  CORONARY ARTERY BYPASS GRAFT TIMES THREE, WITH LEFT INTERNAL MAMARARY ARTERY HARVEST, LEFT ENDOSCOPIC VESSEL PROCUREMENT, EPIAORTIC ULTRASOUND  ECHOCARDIOGRAM, TRANSESOPHAGEAL, INTRAOPERATIVE       Diagnosis: CAD (coronary artery disease) [I25.10]  Diagnosis Additional Information: No value filed.    Anesthesia Type:   No value filed.     Note:    Oropharynx: ventilatory support and endotracheal tube in place  Level of Consciousness: iatrogenic sedation      Independent Airway: airway patency not satisfactory and stable  Dentition: dentition unchanged  Vital Signs Stable: post-procedure vital signs reviewed and stable  Report to RN Given: handoff report given  Patient transferred to: ICU    ICU Handoff: Call for PAUSE to initiate/utilize ICU HANDOFF, Identified Patient, Identified Responsible Provider, Reviewed the Pertinent Medical History, Discussed Surgical Course, Reviewed Intra-OP Anesthesia Management and Issues during Anesthesia, Set Expectations for Post Procedure Period and Allowed Opportunity for Questions and Acknowledgement of Understanding      Vitals:  Vitals Value Taken Time   /51 11/18/24 1620   Temp 99.0 11/18/24 1620   Pulse 89 11/18/24 1623   Resp 0 11/18/24 1615   SpO2 100 % 11/18/24 1623   Vitals shown include unfiled device data.    Electronically Signed By: RANJAN Osborn CRNA  November 18, 2024  4:25 PM

## 2024-11-18 NOTE — PLAN OF CARE
[facility] - ICU    RN Progress Note:            Pertinent Assessments:      Please refer to flowsheet rows for full assessment     Cardiac monitor showing SR with 1st degree and BBB. Blood pressure within normal limits. Spinal cord stimulator remains on, remote at bedside. Morning beta blocker and asa given Consents signed with OR nurse. IABP remains at 1:1. Remains on 3L oxy mask.            Key Events - This Shift:       Plan for surgery this morning.                 Barriers to Discharge / Downgrade:     Open heart surgery, ICU level cares.          Point of Contact Update: YES-OR-NO: Yes  If No, reason:    Name:   Phone Number:   Summary of Conversation:  Updated Red at bedside.

## 2024-11-18 NOTE — ANESTHESIA PROCEDURE NOTES
Airway         Procedure Start/Stop Times: 11/18/2024 11:27 AM and 11/18/2024 11:29 AM  Staff -        Other Anesthesia Staff: Kimi Barton       Performed By: SRNA  Consent for Airway        Urgency: elective  Indications and Patient Condition       Indications for airway management: shukri-procedural and airway protection       Induction type:intravenous       Mask difficulty assessment: 1 - vent by mask    Final Airway Details       Final airway type: endotracheal airway       Successful airway: Single subglottic suction  Endotracheal Airway Details        ETT size (mm): 8.0       Cuffed: yes       Successful intubation technique: direct laryngoscopy       DL Blade Type: MAC 3       Grade View of Cords: 1       Adjucts: stylet       Position: Right       Measured from: gums/teeth       Secured at (cm): 23       Bite block used: None    Post intubation assessment        Placement verified by: capnometry, equal breath sounds and chest rise        Number of attempts at approach: 1       Number of other approaches attempted: 0       Secured with: tape       Ease of procedure: easy       Dentition: Intact and Unchanged    Medication(s) Administered   Medication Administration Time: 11/18/2024 11:27 AM

## 2024-11-18 NOTE — PROGRESS NOTES
CVTS Daily Progress Note   POD# 1  s/p CABG x3 (LIMA>LAD, rSVG>RI, rSVG>distal OM) , LLE EVH w/ IABP in place  Attending: Nathan  LOS: 4    SUBJECTIVE/INTERVAL EVENTS:    Patient arrived to ICU from OR yesterday afternoon. He remains intubated and is weaning from pressors. CVP 13, CI 2.3, SVR 1100. No acute events overnight.. Patient progressing fairly. Maintaining oxygen saturations on CMV 30% FiO2. Normotensive  on 0.06 epi, 1 cardene for high SVR . Ambulating with therapy deferred d/t above. Pain well controlled. Increasing agitation overnight, required versed bump to assist w/ anxiety. - BM / - flatus. NPO d/t above. UOP  improved after lasix this AM . Chest tube output appropriate. Hgb 11.1. Patient unable to participate in rounds d/t above.     OBJECTIVE:  Temp:  [97.5  F (36.4  C)-98.4  F (36.9  C)] 98  F (36.7  C)  Pulse:  [70-83] 76  Resp:  [11-30] 30  BP: (114-152)/(57-95) 121/85  SpO2:  [90 %-99 %] 97 %  Vitals:    11/14/24 1513 11/16/24 0440 11/16/24 0441 11/17/24 0200   Weight: 91 kg (200 lb 9.6 oz) 90.2 kg (198 lb 12.8 oz) 90.2 kg (198 lb 12.8 oz) 91.4 kg (201 lb 8 oz)    11/18/24 0500   Weight: 92.4 kg (203 lb 11.2 oz)       Clinically Significant Risk Factors         # Hyponatremia: Lowest Na = 134 mmol/L in last 2 days, will monitor as appropriate       # Hypoalbuminemia: Lowest albumin = 2.6 g/dL at 11/16/2024  4:06 AM, will monitor as appropriate     # Hypertension: Noted on problem list  # Chronic heart failure with reduced ejection fraction: last echo with EF <40%          # DMII: A1C = 9.1 % (Ref range: <5.7 %) within past 6 months   # Overweight: Estimated body mass index is 27.63 kg/m  as calculated from the following:    Height as of this encounter: 1.829 m (6').    Weight as of this encounter: 92.4 kg (203 lb 11.2 oz)., PRESENT ON ADMISSION     # Financial/Environmental Concerns:                   Current Medications:    Scheduled Meds:  Current Facility-Administered Medications    Medication Dose Route Frequency Provider Last Rate Last Admin    allopurinol (ZYLOPRIM) tablet 100 mg  100 mg Oral Daily Tammie Matta PA-C   100 mg at 11/17/24 0742    aspirin EC tablet 81 mg  81 mg Oral Daily Tammie Matta PA-C   81 mg at 11/17/24 0742    atorvastatin (LIPITOR) tablet 40 mg  40 mg Oral QPM Gondal, Saad J, MD   40 mg at 11/17/24 2033    carvedilol (COREG) tablet 3.125 mg  3.125 mg Oral BID w/meals Efraín Boles MD   3.125 mg at 11/17/24 1729    chlorhexidine (PERIDEX) 0.12 % solution 10 mL  10 mL Swish & Spit Once Shania Barr PA-C        colchicine (COLCRYS) tablet 0.6 mg  0.6 mg Oral BID Tammie Matta PA-C   0.6 mg at 11/17/24 2033    ferrous gluconate (FERGON) tablet 324 mg  324 mg Oral Daily Tammie Matta PA-C   324 mg at 11/17/24 0742    finasteride (PROSCAR) tablet 5 mg  5 mg Oral Daily Tammie Matta PA-C   5 mg at 11/17/24 0742    gabapentin (NEURONTIN) capsule 300 mg  300 mg Oral TID Tammie Matta PA-C   300 mg at 11/17/24 2033    insulin aspart (NovoLOG) injection (RAPID ACTING)   Subcutaneous TID w/meals Lance Haynes DO   5 Units at 11/17/24 1827    insulin aspart (NovoLOG) injection (RAPID ACTING)  1-7 Units Subcutaneous TID AC Gondal, Saad J, MD   2 Units at 11/17/24 1801    [Held by provider] insulin glargine (LANTUS PEN) injection 10 Units  10 Units Subcutaneous BID Gondal, Saad J, MD   10 Units at 11/17/24 0806    insulin regular (MYXREDLIN) 100-0.9 UT/100ML-% infusion             [Held by provider] lisinopril (ZESTRIL) tablet 10 mg  10 mg Oral Q24H Tammie Matta PA-C        methadone (DOLOPHINE) 10 MG/ML injection             methadone (DOLOPHINE) 10 MG/ML injection             pantoprazole (PROTONIX) EC tablet 40 mg  40 mg Oral QAM AC Lance Haynes DO   40 mg at 11/17/24 0742    polyethylene glycol-propylene glycol (SYSTANE ULTRA) ophthalmic solution 1 drop  1 drop Both Eyes Daily Gondal, Saad J, MD   1 drop at 11/18/24 2518     senna-docusate (SENOKOT-S/PERICOLACE) 8.6-50 MG per tablet 2 tablet  2 tablet Oral Daily with breakfast Lance Haynes DO        [Held by provider] sodium bicarbonate tablet 1,950 mg  1,950 mg Oral TID Tammie Matta PA-C   1,950 mg at 11/14/24 2107    sodium chloride (PF) 0.9% PF flush 3 mL  3 mL Intracatheter Q8H Shania Barr PA-C   3 mL at 11/18/24 0807    torsemide (DEMADEX) tablet 5 mg  5 mg Oral Daily Lance Haynes DO   5 mg at 11/17/24 0742    ursodiol (ACTIGALL) capsule 300 mg  300 mg Oral BID Tammie Matta PA-C   300 mg at 11/17/24 2033     Continuous Infusions:  Current Facility-Administered Medications   Medication Dose Route Frequency Provider Last Rate Last Admin    nitroGLYcerin 50 mg in D5W 250 mL (adult std) infusion CENTRAL   mcg/min Intravenous Continuous Skip Espinoza MD 12 mL/hr at 11/18/24 1002 40 mcg/min at 11/18/24 1002    sodium chloride 0.9 % infusion   Intravenous Continuous Jessica Yepez MD   Stopped at 11/16/24 1842     PRN Meds:.  Current Facility-Administered Medications   Medication Dose Route Frequency Provider Last Rate Last Admin    acetaminophen (TYLENOL) tablet 650 mg  650 mg Oral Q4H PRN Jessica Yepez MD        glucose gel 15-30 g  15-30 g Oral Q15 Min PRN Gondal, Saad J, MD        Or    dextrose 50 % injection 25-50 mL  25-50 mL Intravenous Q15 Min PRN Gondal, Saad J, MD        Or    glucagon injection 1 mg  1 mg Subcutaneous Q15 Min PRN Gondal, Saad J, MD        HOLD:  All AM Medications   Does not apply HOLD Shania Barr PA-C        HOLD: heparin   Does not apply HOLD Shania Barr PA-C        insulin regular (MYXREDLIN) 100-0.9 UT/100ML-% infusion             melatonin tablet 5 mg  5 mg Oral At Bedtime PRN Ramses Tran MD   5 mg at 11/17/24 2205    methadone (DOLOPHINE) 10 MG/ML injection             methadone (DOLOPHINE) 10 MG/ML injection             naloxone (NARCAN) injection 0.2 mg  0.2 mg  Intravenous Q2 Min PRN Tammie Matta PA-C        Or    naloxone (NARCAN) injection 0.4 mg  0.4 mg Intravenous Q2 Min PRN Tammie Matta PA-ANAIS        Or    naloxone (NARCAN) injection 0.2 mg  0.2 mg Intramuscular Q2 Min PRN Tammie Matta PA-C        Or    naloxone (NARCAN) injection 0.4 mg  0.4 mg Intramuscular Q2 Min PRN Tammie Matta PA-C        nitroGLYcerin (NITROSTAT) sublingual tablet 0.4 mg  0.4 mg Sublingual Q5 Min PRN Jeremy Shoemaker MD   0.4 mg at 11/16/24 0758    oxyCODONE (ROXICODONE) tablet 5 mg  5 mg Oral Q4H PRN Tammie Matta PA-C   5 mg at 11/17/24 2205    Or    oxyCODONE (ROXICODONE) tablet 10 mg  10 mg Oral Q4H PRN Tammie Matta PA-C           Cardiographics:    Telemetry monitoring demonstrates paced rhythm at a rate of 90bpm per my personal review.    Imaging:  Results for orders placed or performed during the hospital encounter of 11/14/24   US Carotid Bilateral    Impression    IMPRESSION:  1.  Mild plaque formation, velocities consistent with less than 50% stenosis in the right internal carotid artery.  2.  Mild plaque formation, velocities consistent with less than 50% stenosis in the left internal carotid artery.  3.  Flow within the vertebral arteries is antegrade.   CT Chest w/o Contrast    Impression    IMPRESSION:   1.  Heavy atherosclerotic calcification aortic root and proximal ascending aorta.  2.  Moderate right and small left pleural effusions with associated compressive atelectasis/consolidation in the lung bases. Consolidative opacity left lower lobe somewhat rounded, likely representing atelectasis.  3.  Mildly enlarged mediastinal lymph nodes likely reactive.  4.  Otherwise stable exam.     PET Cardiac Viability    Impression    IMPRESSION:    Findings suggesting stunned, but viable left ventricular myocardium.   XR Chest Port 1 View    Impression    IMPRESSION: Cardiomegaly. Marker for balloon pump in the aortic arch. This could be pulled back  approximately 2 cm. Findings discussed with ( nurse Key    ) clinical service at approximately 0605 hours. Cardiomegaly with mild-to-moderate pulmonary   vascular congestion. Neurostimulator leads in stable position. Small right pleural effusion suspected. The costophrenic angle has been omitted on this exam. Patchy area of atelectasis or infiltrate in the left lung base.     XR Chest Port 1 View    Impression    IMPRESSION: Radiopaque marker of the intra-aortic balloon pump at the level of the mid thoracic arch, unchanged. Mild cardiac enlargement and venous hypertension, unchanged. No significant pleural effusion on either side. Postoperative changes left   shoulder. Degenerative changes both shoulders and the spine, narrowing worse involving the left glenohumeral joint with remodeling of the left humeral head. Electrodes overlying the lower thoracic spine. Monitoring leads overlying the chest.       Labs, personally reviewed.  Hemoglobin   Date Value Ref Range Status   11/18/2024 9.4 (L) 13.3 - 17.7 g/dL Final   11/17/2024 9.1 (L) 13.3 - 17.7 g/dL Final   11/16/2024 9.6 (L) 13.3 - 17.7 g/dL Final   04/29/2021 12.2 (L) 13.3 - 17.7 g/dL Final   07/28/2020 10.7 (L) 13.3 - 17.7 g/dL Final   07/27/2020 11.7 (L) 13.3 - 17.7 g/dL Final     WBC   Date Value Ref Range Status   04/29/2021 8.5 4.0 - 11.0 10e9/L Final   07/28/2020 7.4 4.0 - 11.0 10e9/L Final   07/27/2020 10.4 4.0 - 11.0 10e9/L Final     WBC Count   Date Value Ref Range Status   11/18/2024 8.0 4.0 - 11.0 10e3/uL Final   11/17/2024 7.0 4.0 - 11.0 10e3/uL Final   11/16/2024 7.9 4.0 - 11.0 10e3/uL Final     Platelet Count   Date Value Ref Range Status   11/18/2024 325 150 - 450 10e3/uL Final   11/17/2024 341 150 - 450 10e3/uL Final   11/16/2024 370 150 - 450 10e3/uL Final   04/29/2021 250 150 - 450 10e9/L Final   07/28/2020 219 150 - 450 10e9/L Final   07/27/2020 247 150 - 450 10e9/L Final     Creatinine   Date Value Ref Range Status   11/18/2024 1.40 (H) 0.67  - 1.17 mg/dL Final   11/18/2024 1.41 (H) 0.67 - 1.17 mg/dL Final   11/17/2024 1.47 (H) 0.67 - 1.17 mg/dL Final   04/29/2021 1.19 0.66 - 1.25 mg/dL Final   07/28/2020 1.19 0.66 - 1.25 mg/dL Final   07/27/2020 1.30 (H) 0.66 - 1.25 mg/dL Final     Potassium   Date Value Ref Range Status   11/18/2024 4.3 3.4 - 5.3 mmol/L Final   11/18/2024 4.4 3.4 - 5.3 mmol/L Final   11/17/2024 4.2 3.4 - 5.3 mmol/L Final   08/27/2022 4.6 3.5 - 5.0 mmol/L Final   08/26/2022 4.8 3.5 - 5.0 mmol/L Final   08/25/2022 5.2 (H) 3.5 - 5.0 mmol/L Final   04/29/2021 4.7 3.4 - 5.3 mmol/L Final   07/28/2020 4.5 3.4 - 5.3 mmol/L Final   07/27/2020 3.9 3.4 - 5.3 mmol/L Final     Magnesium   Date Value Ref Range Status   11/18/2024 2.5 (H) 1.7 - 2.3 mg/dL Final   11/17/2024 1.9 1.7 - 2.3 mg/dL Final   11/17/2024 2.0 1.7 - 2.3 mg/dL Final   01/24/2020 1.9 1.6 - 2.3 mg/dL Final   10/20/2018 2.0 1.6 - 2.3 mg/dL Final   05/01/2018 1.8 1.6 - 2.3 mg/dL Final          I/O:  I/O last 3 completed shifts:  In: 3186 [P.O.:2454; I.V.:732]  Out: 2375 [Urine:2375]       Physical Exam:    General: Patient seen in bed intubated/sedated  HEENT: DONALDO, no sclera icterus, moist mucosa, ETT in place, no tracheal deviation  CV: RRR on monitor. 2+ peripheral pulses in all extremities. Mild edema.   Pulm: Non-labored effort on CMV 30% FiO2. Chest tubes in place, no air leak. Incision C/D/I.  Abd: Soft, NT, ND  : Barnes with urban urine  Ext: Mild pedal edema, SCDs in place, warm, distal pulses intact. IABP site C/D/I  Neuro: Sedated, Unable to fully eval, and reportedly follows commands and tracks per overnight RN      ASSESSMENT/PLAN:    Lance Ibrahim is a 79 year old male with a history of CAD w/ known LM stenosis, CKD1, DM2, chronic anemia nonischemic cardiomyopathy (thought to be d/t HTN) w/ chronic HFpEF, h/o CVA (bilateral PCA emboli), and spinal stenosis w/ spinal cord stimulator in place who is s/p CABG x3 .    Principal Problem:    NSTEMI (non-ST elevated  myocardial infarction) (H)        NEURO:   - Scheduled Tylenol/lidocaine patches and PRN Tylenol/oxycodone/dilaudid for pain    CV:   - Pre-op EF 25-30%  - Normotensive  - Patient remains on moderate doses of epi and cardene  - Beta blocker pending weaning from pressors   -  mg  -  Atorvastatin 40mg daily  - Chest tubes/TPW to remain today  - New baseline echo after CT/TPW removed and prior to discharge. - May need LifeVest  - Consider HF consult  - IABP switched to 1:3 on rounds this AM, anticipate removal later this AM.     PULM:   - Vent weaning as able - given agitation likely will need to wait to extubate until after IABP is removed  - Maintaining oxygen saturations on CMV 30% FiO2 - wean as able  - Encourage pulmonary toilet    FEN/GI:  - Diet: NPO/MIVF while intubated  - Continue electrolyte replacement protocol  - Bowel regimen, LBM preop    RENAL:  - adequate UOP/hr. Continue to monitor closely.  - Cr 1.42 (baseline ~ 1.3-1.4)  - Barnes to remain in for close monitoring of I/O and during period of diuresis/relative immobility - plan for removal POD2  - Diuresis PRN today - responded well to lasix this AM both in terms of UOP and hemodynamics    HEME:  - Acute blood loss anemia post-op.   - Hgb 11.1, no active bleeding concerns. Hep SQ, ASA  - INR 1.63 this AM, plan for 1u FFP  - POD#0 got 3u PRBCs and half dose factor VII    ID:  - Amrida op ppx complete, afebrile. No concerns for infection    ENDO:   - HbA1c 9.1%  - BG goal < 180 to promote optimal healing  - PTA on glipizide 2.5 mg qday, lantus 10u BID - holding PO DM2 med until POD#3 pending renal function  - Continue insulin gtt while on epi, (currently on 1.5u per hour)    PPx:   - DVT: SCDs, SQ heparin TID, ambulation   - GI: Protonix 40mg PO daily    DISPO:   - Continue critical care in ICU - IABP, inotropic support, mechanical ventilation  - PT/OT recs at discharge: pending  - Medically Ready for Discharge: Anticipated in 5+ Days        Patient  discussed with Dr. Evans.        Ernestina Barr PA-C  [facility]  Vocera or Secure Chat  November 19, 2024

## 2024-11-18 NOTE — BRIEF OP NOTE
[facility]    Brief Operative Note    Pre-operative diagnosis: CAD (coronary artery disease) [I25.10]  Post-operative diagnosis Ischemic cardiomyopathy    Procedure: CORONARY ARTERY BYPASS GRAFT TIMES THREE, WITH LEFT INTERNAL MAMARARY ARTERY HARVEST, LEFT ENDOSCOPIC VESSEL PROCUREMENT, EPIAORTIC ULTRASOUND, N/A - Chest  ECHOCARDIOGRAM, TRANSESOPHAGEAL, INTRAOPERATIVE, N/A - Heart    Surgeon: Surgeons and Role:     * Tsering Evans MD - Primary     * Felicity Kent STSAC - First Assistant     * Rachel Pearl PA-C - Second Assistnat  Anesthesia: General   Estimated Blood Loss: 700 mL  Incisions: Median sternotomy and skip incision left upper leg  Drains: Two 32 Fr mediastinal and one 24 Trevon draine right and left pleural spaces  Specimens: None  Findings:  Abundant plaque in the ascending aorta.  PA pressures were elevated but improved after surgery.  The LVEF was 25% and this improved to 40% after surgery.  Good targets and conduit.  There was left ventricular hypertrophy.  The right heart was enlarged.  There was a right pleural effusion of about 500 mL of straw colored fluid..  Complications:  None.  Implants:   Implant Name Type Inv. Item Serial No.  Lot No. LRB No. Used Action   FELT ZEFERINO [id number] - [id number] Graft FELT ZEFERINO 2X2 [id number]  CR Pulsity [facility] [id number] N/A 1 Implanted

## 2024-11-18 NOTE — PROGRESS NOTES
1613 Received pt from CV surgery and placed on Drager mechanical ventilation, VC AC mode 18,620, 5, 1.0. SpO2 100 %, decreased FiO2 to .80. BS clear and equal bilat, RT to monitor    FiO2 (%): (S) 1 % (decreased to 0.8), Resp: 19, Vent Mode: CMV/AC, Resp Rate (Set): 18 breaths/min, Tidal Volume (Set, mL): 620 mL, PEEP (cm H2O): 5 cmH2O, Resp Rate (Set): 18 breaths/min, Tidal Volume (Set, mL): 620 mL, PEEP (cm H2O): 5 cmH2O    BP (!) 143/88   Pulse 89   Temp 98  F (36.7  C) (Oral)   Resp 19   Ht 1.829 m (6')   Wt 92.4 kg (203 lb 11.2 oz)   SpO2 100%   BMI 27.63 kg/m

## 2024-11-18 NOTE — PLAN OF CARE
Goal Outcome Evaluation:       [facility] - ICU    RN Progress Note:            Pertinent Assessments:      Please refer to flowsheet rows for full assessment     Aox4. A.febrile. Vital sign within acceptable limit. Adequate uop. Pt will be NPO after MN as ordered for Schedule open heart surgery on 11/18/24 at 10:00am. Will inform the incoming RN to keep Heparin drip running until CV- surgery team do rounding at 7:00 am on 11/18/24 per Dr. Hurt. IABP site and distal circulation intact. Will continue to monitor and update MD as needed.            Key Events - This Shift:       Bedside handoff report given to receiving RN.                 Barriers to Discharge / Downgrade:     -IABP  Heparin and nitroglycerin drip         Point of Contact Update: YES-OR-NO: No   no family at the bedside this shift.

## 2024-11-18 NOTE — PROGRESS NOTES
Patient going for CABG this morning.  HMS will sign off, but please re-consult when patient is extubated and ICU team signs off.

## 2024-11-18 NOTE — PROGRESS NOTES
Pulmonary/Critical Care Consult Team Note    Lance Ibrahim,  1945, MRN [id number]  Admitting Dx: ST elevation MI (STEMI) (H) [I21.3]  Date / Time of Admission:  2024 11:48 AM    OR Events: Brief Sign out from Anesthesia  Procedure: CABG x 2   Airway: easy   EBL: 800  Blood Products: 2 units PRBC, 1 unit albumin  Methadone: 20mg  Reversed: no   Paced: yes, 90  Current medication drips: Epi, levophed    Balloon pump in place 1:1  Not a fast-track candidate due to above    Assessment/Plan: 78 y/o male with PMHx of DM, CVA without residual symptoms, Gout, BPH,  HTN, CAD with multivessel disease s/p CABG x 2 on  ([facility]).    CV: Hx of CAD s/p CABG x 2   - Cardiac monitoring.   - SBP goal > 90 mmHg, MAP goal > 65 mmHg.    - Goal CI 2-3   - Goal PAD 15  - Vasoactive gtts per CV-surgery   - Temporary pacing wires present - paced at 90    Pulm: :   - LAST VENT SETTINGS:  FiO2 (%): (S) 80 % (decreased to 60), Resp: 11, Vent Mode: CMV/AC, Resp Rate (Set): 18 breaths/min, Tidal Volume (Set, mL): 620 mL, PEEP (cm H2O): 5 cmH2O, Resp Rate (Set): 18 breaths/min, Tidal Volume (Set, mL): 620 mL, PEEP (cm H2O): 5 cmH2O  - Wean supplemental O2 as tolerated; goal O2 sat > 92%.  HOB > 30 degrees to limit aspiration risk.    - Check ABG and adjust support as indicated; avoid acidotic state.   - Check CXR  - Once hemodynamically stable, plan to proceed to wake, wean, and extubate.     Neuro:   - Neuro checks per ICU protocol.   - Sedate with precedex until ready to wean and extubate.   - Follow RASS goal  - Pain control: PRN    GI/:   - PPI/H2 blocker, NPO    Renal:   - Monitor I/O's.    - IVFs: per CV-surgery    Heme/Coag:   - Monitor H/H.   - Transfuse per CV-surgery.   - Monitor chest tube output hourly; notify CV-surgery for excessive output or abrupt stop in output.     ID: [id number]    ENDO: cHx of DM, not on insulin gtt currently  - Critical Care hyperglycemic protocol  - Accuchecks and sliding  scale q4      Pt has critical illness and impairs breathing on vent and circulation on vasopressors such as there is high probability of imminent of life threatening deterioration in the patient's condition.    Code Status: FULL CODE    Infusions:  Current Facility-Administered Medications   Medication Dose Route Frequency Provider Last Rate Last Admin    aminocaproic acid (AMICAR) 5 g in sodium chloride 0.9 % 100 mL infusion  1 g/hr Intravenous Continuous Tsering Evans MD 20 mL/hr at 11/18/24 1251 1 g/hr at 11/18/24 1251    dexmedeTOMIDine (PRECEDEX) 4 mcg/mL in sodium chloride 0.9 % 100 mL infusion  0.2-1.2 mcg/kg/hr Intravenous Continuous Tsering Evans MD 11.55 mL/hr at 11/18/24 1156 0.5 mcg/kg/hr at 11/18/24 1156    EPINEPHrine (ADRENALIN) 5 mg in sodium chloride 0.9 % 250 mL infusion CENTRAL  0.01-0.3 mcg/kg/min Intravenous Continuous Tsering Evans MD 27.72 mL/hr at 11/18/24 1438 0.1 mcg/kg/min at 11/18/24 1438    insulin regular (MYXREDLIN) 1 unit/mL infusion  0-24 Units/hr Intravenous Continuous Tsering Evans MD        lactated ringers infusion   Intravenous Continuous Joel Arias MD        niCARdipine 40 mg in 200 mL NS (CARDENE) infusion  0.5-15 mg/hr Intravenous Continuous Tsering Evans MD        nitroGLYcerin 50 mg in D5W 250 mL (adult std) infusion CENTRAL   mcg/min Intravenous Continuous Skip Espinoza MD   Stopped at 11/18/24 1434    norepinephrine (LEVOPHED) 4 mg in  mL infusion PREMIX  0.01-0.1 mcg/kg/min Intravenous Continuous Tsering Evans MD 10.395 mL/hr at 11/18/24 1437 0.03 mcg/kg/min at 11/18/24 1437    phenylephrine (ADENIKE-SYNEPHRINE) 50 mg in NaCl 0.9 % 250 mL infusion  0.1-6 mcg/kg/min Intravenous Continuous Tsering Evans MD        sodium chloride 0.9 % infusion   Intravenous Continuous Jessica Yepez MD   Stopped at 11/16/24 1842       ICU DAILY CHECKLIST           Can patient transfer out of MICU? no  FAST HUG:  Feeding:   "Feeding: No  Barnes:{Yes  Analgesia/Sedation:Yes  Thromboembolic prophylaxis:Contraindication  HOB>30:  No  Stress Ulcer Protocol Active: Yes; Mode: PPI/H2 Antagonist  Glycemic Control: No components found for: \"GLU\" Any glucose > 180 yes; Mode of Insulin Therapy: Sliding Scale Insulin  INTUBATED:  Can patient have daily waking: yes  Can patient have spontaneous breathing trial: yes;  Does pt have restraints: MD RESTRAINT FOR NON-VIOLENT BEHAVIOR FACE TO FACE EVALUATION Patient's Immediate Situation: Patient demonstrated the following behaviors: Impulsive behavior, grabbing at support tubes/lines.  Patient's Reaction to the intervention Does patient understand the reason for restraint/seclusion? Unable to Express Medical Condition Is there any evidence of compromise of Skin integrity, Respiratory, Cardiovascular, Musculoskeletal, Hydration? No Behavioral ConditionIn consultation with the RN, is there a need to continue this restraint or seclusion? Yes See Restraint Flowsheet for complete restraint documentation and assessment.    PHYSICAL THERAPY AND MOBILITY: Can patient have PT and mobility trial: yes Activity: ICU mobility protocol    Critical Care Time excluding procedures and family discussions greater than: 1 Hour 15 Minutes    Risk Factors Present on Admission:  Clinically Significant Risk Factors         # Hyponatremia: Lowest Na = 134 mmol/L in last 2 days, will monitor as appropriate   # Hypocalcemia: Lowest iCa = 4.3 mg/dL in last 2 days, will monitor and replace as appropriate     # Hypoalbuminemia: Lowest albumin = 2.6 g/dL at 11/16/2024  4:06 AM, will monitor as appropriate     # Hypertension: Noted on problem list  # Chronic heart failure with reduced ejection fraction: last echo with EF <40%    # Acute Hypercapnic Respiratory Failure: based on venous blood gas results.  Continue supplemental oxygen and ventilatory support as indicated.        # DMII: A1C = 9.1 % (Ref range: <5.7 %) within past 6 " "months   # Overweight: Estimated body mass index is 27.63 kg/m  as calculated from the following:    Height as of this encounter: 1.829 m (6').    Weight as of this encounter: 92.4 kg (203 lb 11.2 oz).      # Financial/Environmental Concerns:                  Genie Mcdermott DO  Pulmonary and Critical Care Attending  pgr [phone]    Allergies   Allergen Reactions    Ancef [Cefazolin] Rash    Cephalexin Itching and Rash     Allergic reaction required hospitalization 4/2024    Penicillins Anaphylaxis    Sulfa Antibiotics      \"itchy rash, swelling of face and hands\"       Meds: See MAR    Physical Exam:  BP (!) 143/88   Pulse 76   Temp 98  F (36.7  C) (Oral)   Resp 19   Ht 1.829 m (6')   Wt 92.4 kg (203 lb 11.2 oz)   SpO2 97%   BMI 27.63 kg/m    Intake/Output this shift:  I/O this shift:  In: 228 [Other:228]  Out: -   GEN: Intubated and sedated, NAD  HEENT: et tube in place  CVS: mechanical balloon pump augmented   RESP: CTA BL, chest tubes in place, pacer wires in place  ABD: Soft, No abdominal pain with palpation, no guarding, no rigidity  EXT: Warm, well perfused, left leg wrapped, right leg without edema  NEURO:  sedated  PSYCH: sedated    Pertinent Labs: Latest lab results in EHR personally reviewed.   CMP  Recent Labs   Lab 11/18/24  1451 11/18/24  1356 11/18/24  1323 11/18/24  1159 11/18/24  0805 11/18/24  0602 11/18/24  0110 11/18/24  0022 11/17/24  2259 11/17/24  0729 11/17/24  0435 11/16/24  0717 11/16/24  0406 11/15/24  0804 11/15/24  0258 11/14/24  1640 11/14/24  1023    137 137 137  --  137  --  134*  --   --  136  --  139  --  142  --  137   POTASSIUM 5.1 5.3 5.2 4.2  --  4.3  --  4.4 4.2  --  3.7  --  3.7   < > 3.1*  --  3.5   CHLORIDE  --   --   --   --   --  101  --  100  --   --  102  --  103  --  101  --  98   CO2  --   --   --   --   --  25  --  24  --   --  25  --  25  --  28  --  25   ANIONGAP  --   --   --   --   --  11  --  10  --   --  9  --  11  --  13  --  14   * 144* " 147* 112*   < > 124*   < > 93  --    < > 104*   < > 130*   < > 140*   < > 288*   BUN  --   --   --   --   --  23.4*  --  23.3*  --   --  22.7  --  22.1  --  20.9  --  23.0   CR  --   --   --   --   --  1.40*  --  1.41*  --   --  1.47*  --  1.30*  --  1.38*  --  1.45*   GFRESTIMATED  --   --   --   --   --  51*  --  50*  --   --  48*  --  56*  --  52*  --  49*   SHANNAN  --   --   --   --   --  8.5*  --  8.3*  --   --  8.3*  --  8.5*  --  8.4*  --  8.6*   MAG  --   --   --   --   --  2.5*  --   --  1.9  --  2.0  --  2.3  --  1.9  --  2.0   PHOS  --   --   --   --   --   --   --   --   --   --   --   --   --   --  4.1  --   --    PROTTOTAL  --   --   --   --   --  6.7  --  6.5  --   --   --   --  6.7  --   --   --  7.1   ALBUMIN  --   --   --   --   --  2.7*  --  2.7*  --   --   --   --  2.6*  --   --   --  3.0*   BILITOTAL  --   --   --   --   --  1.0  --  1.0  --   --   --   --  1.2  --   --   --  1.6*   ALKPHOS  --   --   --   --   --  333*  --  314*  --   --   --   --  367*  --   --   --  393*   AST  --   --   --   --   --  45  --  43  --   --   --   --  31  --   --   --  38   ALT  --   --   --   --   --  26  --  25  --   --   --   --  28  --   --   --  33    < > = values in this interval not displayed.     CBC  Recent Labs   Lab 11/18/24  1451 11/18/24  1356 11/18/24  1323 11/18/24  1159 11/18/24  0602 11/17/24  0435 11/16/24  0406 11/15/24  0258   WBC  --   --   --   --  8.0 7.0 7.9 9.1   RBC  --   --   --   --  3.40* 3.36* 3.50* 3.41*   HGB 9.6* 8.7* 9.1* 9.8* 9.4* 9.1* 9.6* 9.5*   HCT  --   --   --   --  31.6* 31.6* 32.6* 31.6*   MCV  --   --   --   --  93 94 93 93   MCH  --   --   --   --  27.6 27.1 27.4 27.9   MCHC  --   --   --   --  29.7* 28.8* 29.4* 30.1*   RDW  --   --   --   --  16.3* 17.2* 17.2* 17.2*   PLT  --   --   --   --  325 341 370 369     INR  Recent Labs   Lab 11/15/24  0258 11/14/24  1123   INR 1.73* 2.04*     Arterial Blood Gas  Recent Labs   Lab 11/18/24  1451 11/18/24  1356 11/18/24  1159   PH  7.31* 7.36 7.43   PCO2 46* 43 38   PO2 226* 375* 114*   HCO3 22 23 25       Cultures: not yet available.    Imaging: personally reviewed.   Results for orders placed during the hospital encounter of 11/14/24    XR Chest Port 1 View    Narrative  EXAM: XR CHEST PORT 1 VIEW  LOCATION: [facility]  DATE: 11/18/2024    INDICATION: Confirm IABP placement.  COMPARISON: Portable chest single view 11/17/2024 at 0536 hours.    Impression  IMPRESSION: Radiopaque marker of the intra-aortic balloon pump at the level of the mid thoracic arch, unchanged. Mild cardiac enlargement and venous hypertension, unchanged. No significant pleural effusion on either side. Postoperative changes left  shoulder. Degenerative changes both shoulders and the spine, narrowing worse involving the left glenohumeral joint with remodeling of the left humeral head. Electrodes overlying the lower thoracic spine. Monitoring leads overlying the chest.      XR Chest Port 1 View    Narrative  EXAM: XR CHEST PORTABLE 1 VIEW  LOCATION: [facility]  DATE: 11/17/2024    INDICATION: Confirm IABP placement.  COMPARISON: None.    Impression  IMPRESSION: Cardiomegaly. Marker for balloon pump in the aortic arch. This could be pulled back approximately 2 cm. Findings discussed with ( nurse Key    ) clinical service at approximately 0605 hours. Cardiomegaly with mild-to-moderate pulmonary  vascular congestion. Neurostimulator leads in stable position. Small right pleural effusion suspected. The costophrenic angle has been omitted on this exam. Patchy area of atelectasis or infiltrate in the left lung base.    Results for orders placed during the hospital encounter of 05/16/24    CT Chest Pulmonary Embolism w Contrast    Narrative  EXAM: CT CHEST PULMONARY EMBOLISM W CONTRAST  LOCATION: [facility]  DATE: 5/16/2024    INDICATION: Hypoxia.  COMPARISON: None.  TECHNIQUE: CT chest pulmonary  angiogram during arterial phase injection of IV contrast. Multiplanar reformats and MIP reconstructions were performed. Dose reduction techniques were used.  CONTRAST: 75 ML ISOVUE 370.    FINDINGS:  ANGIOGRAM CHEST: Pulmonary arteries are normal caliber and negative for pulmonary emboli. Thoracic aorta is negative for dissection. No CT evidence of right heart strain.    LUNGS AND PLEURA: Mild emphysematous changes in the lungs. Subpleural atelectasis in the lower lungs posteriorly greater on the left. Nothing definite for acute pneumonitis. Subpleural biapical scarring. No pleural effusions.    MEDIASTINUM/AXILLAE: Normal.    CORONARY ARTERY CALCIFICATION: Moderate to severe.    UPPER ABDOMEN: Pneumobilia compatible with prior biliary intervention. Benign left renal cyst. Spleen is enlarged measuring up to 15 cm.    MUSCULOSKELETAL: Severe degenerative arthritis left shoulder. Screw fixation left humeral head. Moderate-to-marked hypertrophic and degenerative changes in the spine. Intrathecal catheter device in place.    Impression  IMPRESSION:  1.  Negative for pulmonary embolism.    2.  Emphysematous changes in the lungs.    3.  Subpleural scarring and atelectasis in the lungs. No evidence for pneumonitis.    4.  Prominent coronary artery calcification.    5.  Splenic enlargement.    6.  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    Results for orders placed during the hospital encounter of 04/25/24    CT Abdomen Pelvis w/o Contrast    Narrative  EXAM: CT ABDOMEN PELVIS W/O CONTRAST  LOCATION: [facility]  DATE: 4/26/2024    INDICATION: Elevated LFTs, lipase.  No significant acute findings found on ultrasound.  COMPARISON: 12/03/2021.  TECHNIQUE: CT scan of the abdomen and pelvis was performed without IV contrast. Multiplanar reformats were obtained. Dose reduction techniques were used.  CONTRAST: None.    FINDINGS:  LOWER CHEST: There is a 2.1 cm nodule  or nodular infiltrate in the left lower lobe on the first image of this exam and not imaged in its entirety.    HEPATOBILIARY: The gallbladder is distended but otherwise appears normal. No calcified gallstone.    PANCREAS: Normal.    SPLEEN: Normal.    ADRENAL GLANDS: Stable small left adrenal gland nodule.    KIDNEYS/BLADDER: No urinary stone or hydronephrosis. Cyst at the upper pole of the left kidney.    BOWEL: Colonic diverticula without acute diverticulitis. No bowel obstruction or inflammation. Moderate amount of stool in the colon. No free intraperitoneal gas or fluid.    LYMPH NODES: Normal.    VASCULATURE: Atherosclerotic calcification of the aorta and its branches. No aneurysm.    PELVIC ORGANS: Probable radiation seeds in the prostate gland.    MUSCULOSKELETAL: Anterior abdominal wall mesh. Small right inguinal hernia containing fat. Spinal stimulator implanted in the subcutaneous fat of the right buttock. Degenerative disease throughout spine.    Impression  IMPRESSION:  1.  Left lower lobe nodule or nodular infiltrate, incompletely imaged on this exam. Follow-up recommended.  2.  No other acute abnormality.  3.  Colonic diverticula without acute diverticulitis. No bowel obstruction or inflammation.    No results found for this or any previous visit.        Patient Active Problem List   Diagnosis    Ventral hernia    Chronic rhinitis    Hypertrophy of prostate with urinary obstruction    Transient cerebral ischemia    Hypertension    CARDIOVASCULAR SCREENING; LDL GOAL LESS THAN 100    Gout    Type 2 diabetes, HbA1c goal < 7% (H)    Cervicalgia    Severe sepsis (H)    Amputated toe, left (H)    Type 2 diabetes mellitus with peripheral vascular disease (H)    Total knee replacement status    Dyspnea on exertion    Chest pain, unspecified type    Cardiomyopathy, unspecified type (H)    Shortness of breath    Renal colic    Obesity (BMI 35.0-39.9) with comorbidity (H)    Stage 3b chronic kidney disease (H)     Ulcerated, foot (H)    Ulcerated, foot, unspecified laterality, limited to breakdown of skin (H)    Acute renal insufficiency    Acute right-sided thoracic back pain    Other constipation    Acute idiopathic gout of right hand    H/O amputation of lesser toe, left (H)    Lower urinary tract symptoms    Gouty arthropathy    Skin rash    Cellulitis of right lower extremity    Diabetic ulcer of toe of right foot associated with diabetes mellitus due to underlying condition, limited to breakdown of skin (H)    Slowing of urinary stream    Nocturia    Muscle pain    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    Diabetic neuropathy associated with type 2 diabetes mellitus (H)    Chronic low back pain    Cellulitis and abscess of foot excluding toe    Chronic systolic congestive heart failure (H)    Coronary artery disease involving native coronary artery of native heart without angina pectoris    Enlarged prostate    Hyperglycemia    Elevated LFTs    Elevated lipase    Diffuse papular rash    Abdominal pain    Hypoxia    Fall, initial encounter    Bacteremia    Acute cystitis without hematuria    Back pain    Cerebrovascular accident (CVA) due to bilateral embolism of posterior cerebral arteries (H)    Hypertensive heart and kidney disease    Insomnia    Iron deficiency anemia    Osteoarthritis of left knee    Post-traumatic osteoarthritis    Acute pain of right shoulder    Fever, unspecified fever cause    Anemia, unspecified    Chronic kidney disease, stage 1    Extradural and subdural abscess, unspecified    Spinal stenosis, site unspecified    Biliary obstruction (H)    Common bile duct obstruction (H)    Muscle weakness (generalized)    Weakness    NSTEMI (non-ST elevated myocardial infarction) (H)       Genie Mcdermott DO  Pulmonary and Critical Care Attending  pgr [phone]    Securely message with the Vocera Web Console (learn more here)

## 2024-11-18 NOTE — ANESTHESIA PROCEDURE NOTES
Central Line/PA Catheter Placement    Pre-Procedure   Staff -        Performed By: anesthesiologist       Location: OR       Pre-Anesthestic Checklist: patient identified, IV checked, site marked, risks and benefits discussed, informed consent, monitors and equipment checked, pre-op evaluation and at physician/surgeon's request  Timeout:       Correct Patient: Yes        Correct Procedure: Yes        Correct Site: Yes        Correct Position: Yes        Correct Laterality: Yes   Line Placement:   This line was placed Post Induction    Procedure   Procedure: central line       Laterality: right       Insertion Site: internal jugular.       Patient Position: Trendelenburg  Sterile Prep        All elements of maximal sterile barrier technique followed       Patient Prep/Sterile Barriers: draped, hand hygiene, gloves , hat , mask , draped, gown, sterile gel and probe cover       Skin prep: Chloraprep  Insertion/Injection        Local skin infiltrated with mL of 1% lidocaine.        Technique: ultrasound guided and Seldinger Technique        1. Ultrasound was used to evaluate the access site.       2. Vein evaluated via ultrasound for patency/adequacy.       3. Using real-time ultrasound the needle/catheter was observed entering the artery/vein.       Introducer Type: 9 Fr, 2-lumen MAC        Type: Introducer  Narrative         Secured by: suture       Biopatch dressing used.       blood aspirated from all lumens,        All lumens flushed: Yes       Verification method: SABRA       Tip termination: right atrium

## 2024-11-19 ENCOUNTER — APPOINTMENT (OUTPATIENT)
Dept: RADIOLOGY | Facility: HOSPITAL | Age: 79
End: 2024-11-19
Payer: COMMERCIAL

## 2024-11-19 DIAGNOSIS — Z95.1 S/P CABG (CORONARY ARTERY BYPASS GRAFT): Primary | ICD-10-CM

## 2024-11-19 LAB
ALBUMIN SERPL BCG-MCNC: 3.4 G/DL (ref 3.5–5.2)
ALP SERPL-CCNC: 214 U/L (ref 40–150)
ALT SERPL W P-5'-P-CCNC: 23 U/L (ref 0–70)
ANION GAP SERPL CALCULATED.3IONS-SCNC: 13 MMOL/L (ref 7–15)
ANION GAP SERPL CALCULATED.3IONS-SCNC: 15 MMOL/L (ref 7–15)
APTT PPP: 47 SECONDS (ref 22–38)
AST SERPL W P-5'-P-CCNC: 50 U/L (ref 0–45)
BASE EXCESS BLDA CALC-SCNC: -1.3 MMOL/L (ref -3–3)
BASE EXCESS BLDA CALC-SCNC: -2.5 MMOL/L (ref -3–3)
BASE EXCESS BLDV CALC-SCNC: -1.8 MMOL/L (ref -3–3)
BILIRUB SERPL-MCNC: 1.1 MG/DL
BLD PROD TYP BPU: NORMAL
BLOOD COMPONENT TYPE: NORMAL
BUN SERPL-MCNC: 21.9 MG/DL (ref 8–23)
BUN SERPL-MCNC: 22.9 MG/DL (ref 8–23)
CA-I BLD-MCNC: 4.7 MG/DL (ref 4.4–5.2)
CA-I BLD-MCNC: 4.7 MG/DL (ref 4.4–5.2)
CALCIUM SERPL-MCNC: 8.4 MG/DL (ref 8.8–10.4)
CALCIUM SERPL-MCNC: 8.5 MG/DL (ref 8.8–10.4)
CHLORIDE SERPL-SCNC: 105 MMOL/L (ref 98–107)
CHLORIDE SERPL-SCNC: 108 MMOL/L (ref 98–107)
CODING SYSTEM: NORMAL
COHGB MFR BLD: 96.9 % (ref 95–96)
COHGB MFR BLD: 97 % (ref 95–96)
CREAT SERPL-MCNC: 1.42 MG/DL (ref 0.67–1.17)
CREAT SERPL-MCNC: 1.42 MG/DL (ref 0.67–1.17)
EGFRCR SERPLBLD CKD-EPI 2021: 50 ML/MIN/1.73M2
EGFRCR SERPLBLD CKD-EPI 2021: 50 ML/MIN/1.73M2
ERYTHROCYTE [DISTWIDTH] IN BLOOD BY AUTOMATED COUNT: 15.9 % (ref 10–15)
ERYTHROCYTE [DISTWIDTH] IN BLOOD BY AUTOMATED COUNT: 16.3 % (ref 10–15)
FIBRINOGEN PPP-MCNC: 554 MG/DL (ref 170–510)
GLUCOSE BLDC GLUCOMTR-MCNC: 101 MG/DL (ref 70–99)
GLUCOSE BLDC GLUCOMTR-MCNC: 102 MG/DL (ref 70–99)
GLUCOSE BLDC GLUCOMTR-MCNC: 103 MG/DL (ref 70–99)
GLUCOSE BLDC GLUCOMTR-MCNC: 105 MG/DL (ref 70–99)
GLUCOSE BLDC GLUCOMTR-MCNC: 106 MG/DL (ref 70–99)
GLUCOSE BLDC GLUCOMTR-MCNC: 107 MG/DL (ref 70–99)
GLUCOSE BLDC GLUCOMTR-MCNC: 108 MG/DL (ref 70–99)
GLUCOSE BLDC GLUCOMTR-MCNC: 110 MG/DL (ref 70–99)
GLUCOSE BLDC GLUCOMTR-MCNC: 113 MG/DL (ref 70–99)
GLUCOSE BLDC GLUCOMTR-MCNC: 114 MG/DL (ref 70–99)
GLUCOSE BLDC GLUCOMTR-MCNC: 116 MG/DL (ref 70–99)
GLUCOSE BLDC GLUCOMTR-MCNC: 119 MG/DL (ref 70–99)
GLUCOSE BLDC GLUCOMTR-MCNC: 121 MG/DL (ref 70–99)
GLUCOSE BLDC GLUCOMTR-MCNC: 121 MG/DL (ref 70–99)
GLUCOSE BLDC GLUCOMTR-MCNC: 122 MG/DL (ref 70–99)
GLUCOSE BLDC GLUCOMTR-MCNC: 124 MG/DL (ref 70–99)
GLUCOSE BLDC GLUCOMTR-MCNC: 124 MG/DL (ref 70–99)
GLUCOSE BLDC GLUCOMTR-MCNC: 127 MG/DL (ref 70–99)
GLUCOSE BLDC GLUCOMTR-MCNC: 82 MG/DL (ref 70–99)
GLUCOSE BLDC GLUCOMTR-MCNC: 98 MG/DL (ref 70–99)
GLUCOSE SERPL-MCNC: 122 MG/DL (ref 70–99)
GLUCOSE SERPL-MCNC: 124 MG/DL (ref 70–99)
HCO3 BLD-SCNC: 21 MMOL/L (ref 21–28)
HCO3 BLD-SCNC: 22 MMOL/L (ref 21–28)
HCO3 BLDV-SCNC: 22 MMOL/L (ref 21–28)
HCO3 SERPL-SCNC: 19 MMOL/L (ref 22–29)
HCO3 SERPL-SCNC: 20 MMOL/L (ref 22–29)
HCT VFR BLD AUTO: 33.5 % (ref 40–53)
HCT VFR BLD AUTO: 35.2 % (ref 40–53)
HGB BLD-MCNC: 10.6 G/DL (ref 13.3–17.7)
HGB BLD-MCNC: 11.1 G/DL (ref 13.3–17.7)
INR PPP: 1.62 (ref 0.85–1.15)
ISSUE DATE AND TIME: NORMAL
MAGNESIUM SERPL-MCNC: 2.6 MG/DL (ref 1.7–2.3)
MCH RBC QN AUTO: 28.2 PG (ref 26.5–33)
MCH RBC QN AUTO: 28.3 PG (ref 26.5–33)
MCHC RBC AUTO-ENTMCNC: 31.5 G/DL (ref 31.5–36.5)
MCHC RBC AUTO-ENTMCNC: 31.6 G/DL (ref 31.5–36.5)
MCV RBC AUTO: 90 FL (ref 78–100)
MCV RBC AUTO: 90 FL (ref 78–100)
O2/TOTAL GAS SETTING VFR VENT: 30 %
OXYHGB MFR BLDV: 65 % (ref 70–75)
PCO2 BLD: 32 MM HG (ref 35–45)
PCO2 BLD: 33 MM HG (ref 35–45)
PCO2 BLDV: 35 MM HG (ref 40–50)
PEEP: 5 CM H2O
PEEP: 5 CM H2O
PH BLD: 7.42 [PH] (ref 7.35–7.45)
PH BLD: 7.45 [PH] (ref 7.35–7.45)
PH BLDV: 7.41 [PH] (ref 7.32–7.43)
PHOSPHATE SERPL-MCNC: 4.4 MG/DL (ref 2.5–4.5)
PLATELET # BLD AUTO: 248 10E3/UL (ref 150–450)
PLATELET # BLD AUTO: 273 10E3/UL (ref 150–450)
PO2 BLD: 80 MM HG (ref 80–105)
PO2 BLD: 92 MM HG (ref 80–105)
PO2 BLDV: 35 MM HG (ref 25–47)
POTASSIUM SERPL-SCNC: 3.9 MMOL/L (ref 3.4–5.3)
POTASSIUM SERPL-SCNC: 4.2 MMOL/L (ref 3.4–5.3)
PROT SERPL-MCNC: 6.1 G/DL (ref 6.4–8.3)
RBC # BLD AUTO: 3.74 10E6/UL (ref 4.4–5.9)
RBC # BLD AUTO: 3.93 10E6/UL (ref 4.4–5.9)
SAO2 % BLDA: 95 % (ref 92–100)
SAO2 % BLDA: 96 % (ref 92–100)
SAO2 % BLDV: 66.3 % (ref 70–75)
SODIUM SERPL-SCNC: 139 MMOL/L (ref 135–145)
SODIUM SERPL-SCNC: 141 MMOL/L (ref 135–145)
UNIT ABO/RH: NORMAL
UNIT NUMBER: NORMAL
UNIT STATUS: NORMAL
UNIT TYPE ISBT: 7300
WBC # BLD AUTO: 11.6 10E3/UL (ref 4–11)
WBC # BLD AUTO: 11.7 10E3/UL (ref 4–11)

## 2024-11-19 PROCEDURE — 85384 FIBRINOGEN ACTIVITY: CPT

## 2024-11-19 PROCEDURE — 82805 BLOOD GASES W/O2 SATURATION: CPT | Performed by: THORACIC SURGERY (CARDIOTHORACIC VASCULAR SURGERY)

## 2024-11-19 PROCEDURE — 82330 ASSAY OF CALCIUM: CPT

## 2024-11-19 PROCEDURE — 94645 CONT INHLJ TX EACH ADDL HOUR: CPT

## 2024-11-19 PROCEDURE — 85610 PROTHROMBIN TIME: CPT

## 2024-11-19 PROCEDURE — 999N000253 HC STATISTIC WEANING TRIALS

## 2024-11-19 PROCEDURE — 83735 ASSAY OF MAGNESIUM: CPT

## 2024-11-19 PROCEDURE — 250N000011 HC RX IP 250 OP 636: Performed by: INTERNAL MEDICINE

## 2024-11-19 PROCEDURE — 82310 ASSAY OF CALCIUM: CPT

## 2024-11-19 PROCEDURE — 82947 ASSAY GLUCOSE BLOOD QUANT: CPT | Performed by: THORACIC SURGERY (CARDIOTHORACIC VASCULAR SURGERY)

## 2024-11-19 PROCEDURE — 250N000011 HC RX IP 250 OP 636

## 2024-11-19 PROCEDURE — 258N000003 HC RX IP 258 OP 636

## 2024-11-19 PROCEDURE — 250N000013 HC RX MED GY IP 250 OP 250 PS 637

## 2024-11-19 PROCEDURE — 999N000009 HC STATISTIC AIRWAY CARE

## 2024-11-19 PROCEDURE — 84075 ASSAY ALKALINE PHOSPHATASE: CPT

## 2024-11-19 PROCEDURE — 94003 VENT MGMT INPAT SUBQ DAY: CPT

## 2024-11-19 PROCEDURE — 250N000009 HC RX 250

## 2024-11-19 PROCEDURE — 84100 ASSAY OF PHOSPHORUS: CPT

## 2024-11-19 PROCEDURE — 85027 COMPLETE CBC AUTOMATED: CPT

## 2024-11-19 PROCEDURE — P9059 PLASMA, FRZ BETWEEN 8-24HOUR: HCPCS

## 2024-11-19 PROCEDURE — 33968 REMOVE AORTIC ASSIST DEVICE: CPT | Mod: 79

## 2024-11-19 PROCEDURE — 99291 CRITICAL CARE FIRST HOUR: CPT | Mod: 24 | Performed by: INTERNAL MEDICINE

## 2024-11-19 PROCEDURE — 250N000013 HC RX MED GY IP 250 OP 250 PS 637: Performed by: INTERNAL MEDICINE

## 2024-11-19 PROCEDURE — 999N000157 HC STATISTIC RCP TIME EA 10 MIN

## 2024-11-19 PROCEDURE — 82805 BLOOD GASES W/O2 SATURATION: CPT

## 2024-11-19 PROCEDURE — 250N000011 HC RX IP 250 OP 636: Performed by: THORACIC SURGERY (CARDIOTHORACIC VASCULAR SURGERY)

## 2024-11-19 PROCEDURE — 82330 ASSAY OF CALCIUM: CPT | Performed by: THORACIC SURGERY (CARDIOTHORACIC VASCULAR SURGERY)

## 2024-11-19 PROCEDURE — 85730 THROMBOPLASTIN TIME PARTIAL: CPT

## 2024-11-19 PROCEDURE — 82310 ASSAY OF CALCIUM: CPT | Performed by: THORACIC SURGERY (CARDIOTHORACIC VASCULAR SURGERY)

## 2024-11-19 PROCEDURE — 80053 COMPREHEN METABOLIC PANEL: CPT

## 2024-11-19 PROCEDURE — 71045 X-RAY EXAM CHEST 1 VIEW: CPT

## 2024-11-19 PROCEDURE — 200N000001 HC R&B ICU

## 2024-11-19 RX ORDER — FUROSEMIDE 10 MG/ML
40 INJECTION INTRAMUSCULAR; INTRAVENOUS ONCE
Status: COMPLETED | OUTPATIENT
Start: 2024-11-19 | End: 2024-11-19

## 2024-11-19 RX ADMIN — CLINDAMYCIN PHOSPHATE 900 MG: 900 INJECTION, SOLUTION INTRAVENOUS at 01:01

## 2024-11-19 RX ADMIN — ACETAMINOPHEN 650 MG: 650 SUPPOSITORY RECTAL at 09:17

## 2024-11-19 RX ADMIN — EPINEPHRINE 0.06 MCG/KG/MIN: 1 INJECTION INTRAMUSCULAR; INTRAVENOUS; SUBCUTANEOUS at 01:36

## 2024-11-19 RX ADMIN — FUROSEMIDE 40 MG: 10 INJECTION, SOLUTION INTRAMUSCULAR; INTRAVENOUS at 04:21

## 2024-11-19 RX ADMIN — MIDAZOLAM HYDROCHLORIDE 2 MG: 1 INJECTION, SOLUTION INTRAMUSCULAR; INTRAVENOUS at 07:41

## 2024-11-19 RX ADMIN — MIDAZOLAM HYDROCHLORIDE 2 MG: 1 INJECTION, SOLUTION INTRAMUSCULAR; INTRAVENOUS at 12:18

## 2024-11-19 RX ADMIN — HEPARIN SODIUM 5000 UNITS: 5000 INJECTION, SOLUTION INTRAVENOUS; SUBCUTANEOUS at 21:38

## 2024-11-19 RX ADMIN — CHLORHEXIDINE GLUCONATE 15 ML: 1.2 SOLUTION ORAL at 07:41

## 2024-11-19 RX ADMIN — ACETAMINOPHEN 650 MG: 650 SUPPOSITORY RECTAL at 22:17

## 2024-11-19 RX ADMIN — DEXMEDETOMIDINE HYDROCHLORIDE 1.2 MCG/KG/HR: 400 INJECTION INTRAVENOUS at 13:00

## 2024-11-19 RX ADMIN — EPINEPHRINE 0.05 MCG/KG/MIN: 1 INJECTION INTRAMUSCULAR; INTRAVENOUS; SUBCUTANEOUS at 18:02

## 2024-11-19 RX ADMIN — FUROSEMIDE 40 MG: 10 INJECTION, SOLUTION INTRAMUSCULAR; INTRAVENOUS at 14:20

## 2024-11-19 RX ADMIN — FUROSEMIDE 40 MG: 10 INJECTION, SOLUTION INTRAMUSCULAR; INTRAVENOUS at 10:12

## 2024-11-19 RX ADMIN — CLINDAMYCIN PHOSPHATE 900 MG: 900 INJECTION, SOLUTION INTRAVENOUS at 09:14

## 2024-11-19 RX ADMIN — HYDROMORPHONE HYDROCHLORIDE 0.2 MG: 0.2 INJECTION, SOLUTION INTRAMUSCULAR; INTRAVENOUS; SUBCUTANEOUS at 20:03

## 2024-11-19 RX ADMIN — SODIUM CHLORIDE 1500 MG: 9 INJECTION, SOLUTION INTRAVENOUS at 10:30

## 2024-11-19 RX ADMIN — HEPARIN SODIUM 5000 UNITS: 5000 INJECTION, SOLUTION INTRAVENOUS; SUBCUTANEOUS at 11:59

## 2024-11-19 RX ADMIN — DEXMEDETOMIDINE HYDROCHLORIDE 1.2 MCG/KG/HR: 400 INJECTION INTRAVENOUS at 10:22

## 2024-11-19 RX ADMIN — ACETAMINOPHEN 650 MG: 650 SUPPOSITORY RECTAL at 01:15

## 2024-11-19 RX ADMIN — DEXMEDETOMIDINE HYDROCHLORIDE 0.8 MCG/KG/HR: 400 INJECTION INTRAVENOUS at 16:40

## 2024-11-19 RX ADMIN — DEXMEDETOMIDINE HYDROCHLORIDE 1.2 MCG/KG/HR: 400 INJECTION INTRAVENOUS at 05:54

## 2024-11-19 RX ADMIN — CHLORHEXIDINE GLUCONATE 15 ML: 1.2 SOLUTION ORAL at 21:38

## 2024-11-19 RX ADMIN — LIDOCAINE 1 PATCH: 4 PATCH TOPICAL at 16:39

## 2024-11-19 RX ADMIN — NICARDIPINE HYDROCHLORIDE 3 MG/HR: 0.2 INJECTION, SOLUTION INTRAVENOUS at 19:20

## 2024-11-19 RX ADMIN — ASPIRIN 300 MG: 300 SUPPOSITORY RECTAL at 09:17

## 2024-11-19 RX ADMIN — DEXMEDETOMIDINE HYDROCHLORIDE 1 MCG/KG/HR: 400 INJECTION INTRAVENOUS at 01:34

## 2024-11-19 ASSESSMENT — ACTIVITIES OF DAILY LIVING (ADL)
ADLS_ACUITY_SCORE: 0
ADLS_ACUITY_SCORE: 27.75
ADLS_ACUITY_SCORE: 0
ADLS_ACUITY_SCORE: 28.25
ADLS_ACUITY_SCORE: 0
ADLS_ACUITY_SCORE: 28.25

## 2024-11-19 NOTE — PROGRESS NOTES
Patient weaned for 23 minutes and SBT was stopped due to increased RR. RT will follow.    Asher De Los Santos, RT

## 2024-11-19 NOTE — PLAN OF CARE
Goal Outcome Evaluation:      Plan of Care Reviewed With: patient    Overall Patient Progress: no changeOverall Patient Progress: no change    Outcome Evaluation: Remained Intubated ,on  IABP, CABG x3 post Op day 1    [facility] - ICU    RN Progress Note:            Pertinent Assessments:      Please refer to flowsheet rows for full assessment     Increased gag reflex and restlessness when awake. Tracking, moves hands and feet. Tried to reorient and update with plan of care but agitation increased due to wanting to communicate and forcing self to talk around his ETT.           Key Events - This Shift:       Updated Dr. Evans around 03:30 regarding elevated CVP of  17-18, SVR 1300's accompanied with low cardiac Index of 1.7 -1.9. Not responsive with Nicardipine and Epi titration. Decreasing urine output of 40 ml every 2 hours. Ordered to give Furosemide 40 mg IV stat and keep IABP augmentation to 100%.  Cardiac Index improved to 2.2-2.5, CVP 12-14 , SVR 1200 and  put out 600 ml of urine output after Lasix dose.                Barriers to Discharge / Downgrade:     Vented, Sedated, IABP           Problem: Cardiovascular Surgery  Goal: Optimal Coping with Heart Surgery  Outcome: Progressing     Problem: Cardiovascular Surgery  Goal: Absence of Bleeding  Outcome: Progressing  Intervention: Monitor and Manage Bleeding  Recent Flowsheet Documentation  Taken 11/19/2024 0400 by Sima Cordero RN  Bleeding Management: dressing monitored  Taken 11/19/2024 0000 by Sima Cordero RN  Bleeding Management: dressing monitored     Problem: Cardiovascular Surgery  Goal: Absence of Bleeding  Intervention: Monitor and Manage Bleeding  Recent Flowsheet Documentation  Taken 11/19/2024 0400 by Sima Cordero RN  Bleeding Management: dressing monitored  Taken 11/19/2024 0000 by Sima Cordero RN  Bleeding Management: dressing monitored     Problem: Cardiovascular Surgery  Goal:  Effective Cardiac Function  Outcome: Progressing     Problem: Cardiovascular Surgery  Goal: Optimal Cerebral Tissue Perfusion  Outcome: Progressing  Intervention: Protect and Optimize Cerebral Perfusion  Recent Flowsheet Documentation  Taken 11/19/2024 0400 by Sima Cordero RN  Glycemic Management:   blood glucose monitored   insulin infusion adjusted  Head of Bed (HOB) Positioning: (reverse trendelenburg) other (see comments)  Taken 11/19/2024 0000 by Sima Cordero RN  Glycemic Management:   blood glucose monitored   insulin infusion adjusted  Head of Bed (HOB) Positioning: (reverse trendelenburg) other (see comments)     Problem: Cardiovascular Surgery  Goal: Fluid and Electrolyte Balance  Outcome: Progressing     Problem: Cardiovascular Surgery  Goal: Blood Glucose Level Within Targeted Range  Outcome: Progressing  Intervention: Optimize Glycemic Control  Recent Flowsheet Documentation  Taken 11/19/2024 0400 by Sima Cordero RN  Glycemic Management:   blood glucose monitored   insulin infusion adjusted  Taken 11/19/2024 0000 by Sima Cordero RN  Glycemic Management:   blood glucose monitored   insulin infusion adjusted     Problem: Cardiovascular Surgery  Goal: Acceptable Pain Control  Outcome: Progressing     Problem: Cardiovascular Surgery  Goal: Effective Oxygenation and Ventilation  Outcome: Progressing  Intervention: Optimize Oxygenation and Ventilation  Recent Flowsheet Documentation  Taken 11/19/2024 0400 by Sima Cordero RN  Chest Tube Safety:   all connections secured   petroleum gauze dressing with patient   rubber-tipped hemostat(s) with patient   suction checked  Taken 11/19/2024 0000 by Sima Cordero RN  Chest Tube Safety:   all connections secured   petroleum gauze dressing with patient   rubber-tipped hemostat(s) with patient   suction checked

## 2024-11-19 NOTE — PROGRESS NOTES
FiO2 (%): (S) 30 %, Resp: 18, Vent Mode: CMV/AC, Resp Rate (Set): 18 breaths/min, Tidal Volume (Set, mL): 550 mL, PEEP (cm H2O): 5 cmH2O, Resp Rate (Set): 18 breaths/min, Tidal Volume (Set, mL): 550 mL, PEEP (cm H2O): 5 cmH2O  Vent change 02 decreased to 30%, sats 100% on 40%

## 2024-11-19 NOTE — PLAN OF CARE
Goal Outcome Evaluation:      Plan of Care Reviewed With: durable power of , child    Overall Patient Progress: improvingOverall Patient Progress: improving    Outcome Evaluation: Pt requiring less FiO2    [facility] - ICU    RN Progress Note:            Pertinent Assessments:      Please refer to flowsheet rows for full assessment     See note below           Key Events - This Shift:       Pt arrived to ICU around 1615. Due to need for IABP, care team decided to keep pt sedated & intubated. Currently, pt following commands & moving all extremities. BP labile. IABP on 1:1. IVFs given & vasoactive gtts titrated to maintain hemodynamic stability. Chest tube output excessive upon pt's arrival to ICU. CV surgeon notified who ordered 1 PRBCs. Writer transfused PRBCs. Chest tube output now appropriate. UOP sluggish but adequate. Pain well controlled with current pain regimen. Oncoming RN updated on pt's status.                 Barriers to Discharge / Downgrade:     Sedated & intubated, IABP, vasoactive gtts, monitoring lines         Point of Contact Update: YES-OR-NO: Yes    Name:Omari  Phone Number:[phone]  Summary of Conversation: Pt to remain sedated & intubated overnight d/t IABP.

## 2024-11-19 NOTE — PROGRESS NOTES
Critical Care Progress Note      11/19/2024    Name: Lance Ibrahim MRN#: [id number]   Age: 79 year old YOB: 1945     Hsptl Day# 5  ICU DAY #    MV DAY #             Problem List:   Principal Problem:    NSTEMI (non-ST elevated myocardial infarction) (H)  Active Problems:    ST elevation MI (STEMI) (H)    Clinically Significant Risk Factors         # Hyponatremia: Lowest Na = 134 mmol/L in last 2 days, will monitor as appropriate  # Hyperchloremia: Highest Cl = 108 mmol/L in last 2 days, will monitor as appropriate      # Hypocalcemia: Lowest iCa = 4.3 mg/dL in last 2 days, will monitor and replace as appropriate     # Hypoalbuminemia: Lowest albumin = 2.6 g/dL at 11/16/2024  4:06 AM, will monitor as appropriate  # Coagulation Defect: INR = 1.62 (Ref range: 0.85 - 1.15) and/or PTT = 47 Seconds (Ref range: 22 - 38 Seconds), will monitor for bleeding    # Hypertension: Noted on problem list  # Acute heart failure with reduced ejection fraction: last echo with EF <40% and receiving IV diuretics    # Acute Hypercapnic Respiratory Failure: based on venous blood gas results.  Continue supplemental oxygen and ventilatory support as indicated.        # DMII: A1C = 9.1 % (Ref range: <5.7 %) within past 6 months   # Overweight: Estimated body mass index is 27.93 kg/m  as calculated from the following:    Height as of this encounter: 1.829 m (6').    Weight as of this encounter: 93.4 kg (205 lb 14.6 oz).      # Financial/Environmental Concerns:     # History of CABG: noted on surgical history                         Summary/Hospital Course:     80 y/o male with PMHx of DM, CVA without residual symptoms, Gout, BPH,  HTN, CAD with multivessel disease s/p CABG x 2 on 11/18 ([place]).   Overnight he remained on IABP was removed this morning.        Assessment and plan :       I have personally reviewed the daily labs, imaging studies, cultures and discussed the case with referring physician and consulting physicians.      My assessment and plan by system for this patient is as follows:    Neurology/Psychiatry:   Sedated with fentanyl and Precedex    Cardiovascular:   Multivessel coronary disease status post CABG x 2  IABP removed this morning  Remains on nicardipine and epinephrine      Pulmonary/Ventilator Management:   Acute respiratory failure  Agitated when sedation decreased.  Not safe to extubate prior to IABP removal  Once he can set up this afternoon we can start SBT's and hopefully we can extubate him      GI and Nutrition :   N.p.o.      Renal/Fluids/Electrolytes:   Volume up.  Received Lasix with great urine output    - monitor function and electrolytes as needed with replacement per ICU protocols.  - generally avoid nephrotoxic agents such as NSAID, IV contrast unless specifically required  - adjust medications as needed for renal clearance  - follow I/O's as appropriate.    Endocrine:   History of diabetes mellitus on glargine  Insulin drip to maintain glucose control  Plan  - ICU insulin protocol, goal sugar <180      ICU Prophylaxis:   1. DVT: Hep Subq  2. VAP: HOB 30 degrees, chlorhexidine rinse  3. Stress Ulcer: PPI  4. Restraints: Nonviolent soft two point restraints required and necessary for patient safety and continued cares and good effect as patient continues to pull at necessary lines, tubes despite education and distraction. Will readdress daily.            Key Medications:     Current Facility-Administered Medications   Medication Dose Route Frequency Provider Last Rate Last Admin    acetaminophen (TYLENOL) tablet 975 mg  975 mg Oral Q8H Shania Barr PA-C        Or    acetaminophen (TYLENOL) Suppository 650 mg  650 mg Rectal Q8H Shania Barr PA-C   650 mg at 11/19/24 0917    allopurinol (ZYLOPRIM) tablet 100 mg  100 mg Oral Daily Shania Barr PA-C        aspirin (ASA) chewable tablet 162 mg  162 mg Oral or NG Tube Daily Shania Barr PA-C        Or    aspirin (ASA)  Suppository 300 mg  300 mg Rectal Daily Shania Barr PA-C   300 mg at 11/19/24 0917    atorvastatin (LIPITOR) tablet 40 mg  40 mg Oral QPM Shania Barr PA-C   40 mg at 11/17/24 2033    chlorhexidine (PERIDEX) 0.12 % solution 15 mL  15 mL Swish & Spit Q12H [facility] (08/20) Ryan Hallman MD   15 mL at 11/19/24 0741    coagulation Factor VIIa recomb (NOVOSEVEN RT) injection 2,000 mcg  2,000 mcg Intravenous Once Tsering Evans MD        ferrous gluconate (FERGON) tablet 324 mg  324 mg Oral Daily Shania Barr PA-C        finasteride (PROSCAR) tablet 5 mg  5 mg Oral Daily Shania Barr PA-C        [Held by provider] glipiZIDE (GLUCOTROL XL) 24 hr tablet 2.5 mg  2.5 mg Oral Daily Shania Barr PA-C        heparin ANTICOAGULANT injection 5,000 Units  5,000 Units Subcutaneous Q8H Shania Barr PA-C   5,000 Units at 11/19/24 1159    Lidocaine (LIDOCARE) 4 % Patch 1-2 patch  1-2 patch Transdermal Q24H Shania Barr PA-C   2 patch at 11/18/24 1852    pantoprazole (PROTONIX) 2 mg/mL suspension 40 mg  40 mg Oral or NG Tube QAM AC Shania Barr PA-C        Or    pantoprazole (PROTONIX) EC tablet 40 mg  40 mg Oral QAM AC Shania Barr PA-C        polyethylene glycol (MIRALAX) Packet 17 g  17 g Oral Daily Shania Barr PA-C        senna-docusate (SENOKOT-S/PERICOLACE) 8.6-50 MG per tablet 1 tablet  1 tablet Oral BID Shania Barr PA-C        [Held by provider] sodium bicarbonate tablet 1,950 mg  1,950 mg Oral TID Shania Barr PA-C        ursodiol (ACTIGALL) capsule 300 mg  300 mg Oral BID Shania Barr PA-C         Current Facility-Administered Medications   Medication Dose Route Frequency Provider Last Rate Last Admin    dexmedeTOMIDine (PRECEDEX) 4 mcg/mL in sodium chloride 0.9 % 100 mL infusion  0.1-1.2 mcg/kg/hr Intravenous Continuous Shania Barr PA-C 27.7 mL/hr at 11/19/24 1300 1.2 mcg/kg/hr at 11/19/24 1300     EPINEPHrine (ADRENALIN) 5 mg in sodium chloride 0.9 % 250 mL infusion CENTRAL  0.01-0.1 mcg/kg/min Intravenous Continuous PRN Shania Barr PA-C 16.6 mL/hr at 11/19/24 1005 0.06 mcg/kg/min at 11/19/24 1005    fentaNYL (SUBLIMAZE) infusion   mcg/hr Intravenous Continuous Sharron, Genie, DO 0.5 mL/hr at 11/19/24 0300 25 mcg/hr at 11/19/24 0300    insulin regular (MYXREDLIN) 1 unit/mL infusion  0-24 Units/hr Intravenous Continuous Shania Barr PA-C 1.5 mL/hr at 11/19/24 1209 1.5 Units/hr at 11/19/24 1209    niCARdipine 40 mg in 200 mL NS (CARDENE) infusion  0.5-15 mg/hr Intravenous Continuous PRN Shania Barr PA-C 15 mL/hr at 11/19/24 1145 3 mg/hr at 11/19/24 1145    No anticoagulation (IABP 1:1)   Does not apply DOES NOT GO TO Shania Rueda PA-C        phenylephrine (ADENIKE-SYNEPHRINE) 50 mg in NaCl 0.9 % 250 mL infusion  0.1-4 mcg/kg/min Intravenous Continuous PRN Shania Barr PA-C        Reason beta blocker order not selected   Does not apply DOES NOT GO TO Shanai Rueda PA-C        sodium chloride 0.9 % infusion   Intravenous Continuous Shania Barr PA-C 30 mL/hr at 11/18/24 1630 Rate Verify at 11/18/24 1630               Physical Examination:   Temp:  [98.1  F (36.7  C)-99.7  F (37.6  C)] 99.7  F (37.6  C)  Pulse:  [72-90] 77  Resp:  [11-21] 18  BP: (123)/(65) 123/65  MAP:  [62 mmHg-101 mmHg] 70 mmHg  Arterial Line BP: ()/(32-63) 123/50  FiO2 (%):  [30 %-100 %] 30 %  SpO2:  [95 %-100 %] 97 %    Intake/Output Summary (Last 24 hours) at 11/19/2024 1343  Last data filed at 11/19/2024 1300  Gross per 24 hour   Intake 5918.17 ml   Output 3825 ml   Net 2093.17 ml     Wt Readings from Last 4 Encounters:   11/19/24 93.4 kg (205 lb 14.6 oz)   11/14/24 87.1 kg (192 lb)   09/10/24 88 kg (194 lb)   09/03/24 88 kg (194 lb)     Arterial Line BP: ()/(32-63) 123/50  MAP:  [62 mmHg-101 mmHg] 70 mmHg  BP - Mean:  [89] 89  CVP:  [0 mmHg-22 mmHg]  16 mmHg  SVO2:  [57 %-86 %] 67 %  FiO2 (%): 30 %, Resp: 18, Vent Mode: CMV/AC, Resp Rate (Set): 18 breaths/min, Tidal Volume (Set, mL): 550 mL, PEEP (cm H2O): 5 cmH2O, Resp Rate (Set): 18 breaths/min, Tidal Volume (Set, mL): 550 mL, PEEP (cm H2O): 5 cmH2O  Recent Labs   Lab 11/19/24  0532 11/19/24  0518 11/18/24  2212 11/18/24  1451 11/18/24  1356   PH  --  7.42 7.42 7.31* 7.36   PCO2  --  33* 30* 46* 43   PO2  --  92 119* 226* 375*   HCO3  --  21 20* 22 23   O2PER 30 30 40  --   --        GEN: no acute distress   HEENT: head ncat, sclera anicteric, OP patent, trachea midline   PULM: unlabored synchronous with vent, clear anteriorly    CV/COR: RRR S1S2 no gallop,  No rub, no murmur  ABD: soft nontender, hypoactive bowel sounds, no mass  EXT: No significant edema  NEURO: Sedated  SKIN: no obvious rash  LINES: clean, dry intact         Data:   All data and imaging reviewed     ROUTINE ICU LABS (Last four results)  CMP  Recent Labs   Lab 11/19/24  1209 11/19/24  1020 11/19/24  0901 11/19/24  0800 11/19/24  0706 11/19/24  0511 11/19/24  0008 11/19/24  0006 11/18/24  1705 11/18/24  1630 11/18/24  1625 11/18/24  1451 11/18/24  0805 11/18/24  0602 11/18/24  0110 11/18/24  0022 11/17/24  2259 11/15/24  0804 11/15/24  0258   NA  --   --   --   --   --  141  --  139  --  138  --  137   < > 137  --  134*  --    < > 142   POTASSIUM  --   --   --   --   --  3.9  --  4.2  --  4.7  --  5.1   < > 4.3  --  4.4 4.2   < > 3.1*   CHLORIDE  --   --   --   --   --  108*  --  105  --  104  --   --   --  101  --  100  --    < > 101   CO2  --   --   --   --   --  20*  --  19*  --  20*  --   --   --  25  --  24  --    < > 28   ANIONGAP  --   --   --   --   --  13  --  15  --  14  --   --   --  11  --  10  --    < > 13   * 113* 116* 107*   < > 122*   < > 124*   < > 181*   < > 155*   < > 124*   < > 93  --    < > 140*   BUN  --   --   --   --   --  22.9  --  21.9  --  21.1  --   --   --  23.4*  --  23.3*  --    < > 20.9   CR  --   --    --   --   --  1.42*  --  1.42*  --  1.33*  --   --   --  1.40*  --  1.41*  --    < > 1.38*   GFRESTIMATED  --   --   --   --   --  50*  --  50*  --  54*  --   --   --  51*  --  50*  --    < > 52*   SHANNAN  --   --   --   --   --  8.5*  --  8.4*  --  8.4*  --   --   --  8.5*  --  8.3*  --    < > 8.4*   MAG  --   --   --   --   --  2.6*  --   --   --  2.8*  --   --   --  2.5*  --   --  1.9   < > 1.9   PHOS  --   --   --   --   --  4.4  --   --   --  4.3  --   --   --   --   --   --   --   --  4.1   PROTTOTAL  --   --   --   --   --   --   --  6.1*  --  6.1*  --   --   --  6.7  --  6.5  --    < >  --    ALBUMIN  --   --   --   --   --   --   --  3.4*  --  3.1*  --   --   --  2.7*  --  2.7*  --    < >  --    BILITOTAL  --   --   --   --   --   --   --  1.1  --  1.2  --   --   --  1.0  --  1.0  --    < >  --    ALKPHOS  --   --   --   --   --   --   --  214*  --  254*  --   --   --  333*  --  314*  --    < >  --    AST  --   --   --   --   --   --   --  50*  --  54*  --   --   --  45  --  43  --    < >  --    ALT  --   --   --   --   --   --   --  23  --  24  --   --   --  26  --  25  --    < >  --     < > = values in this interval not displayed.     CBC  Recent Labs   Lab 11/19/24  0511 11/18/24  1630 11/18/24  1451 11/18/24  1405 11/18/24  1159 11/18/24  0602   WBC 11.7* 17.6*  --  16.4*  --  8.0   RBC 3.93* 3.71*  --  3.28*  --  3.40*   HGB 11.1* 10.8* 9.6* 9.2*   < > 9.4*   HCT 35.2* 33.4*  --  30.4*  --  31.6*   MCV 90 90  --  93  --  93   MCH 28.2 29.1  --  28.0  --  27.6   MCHC 31.5 32.3  --  30.3*  --  29.7*   RDW 15.9* 15.9*  --  15.8*  --  16.3*    262  --  242  --  325    < > = values in this interval not displayed.     INR  Recent Labs   Lab 11/19/24  0801 11/18/24  1630 11/18/24  1405 11/15/24  0258   INR 1.62* 1.00 2.04* 1.73*     Arterial Blood Gas  Recent Labs   Lab 11/19/24  0532 11/19/24  0518 11/18/24  2212 11/18/24  1451 11/18/24  1356   PH  --  7.42 7.42 7.31* 7.36   PCO2  --  33* 30* 46* 43  "  PO2  --  92 119* 226* 375*   HCO3  --  21 20* 22 23   O2PER 30 30 40  --   --        All cultures:  No results for input(s): \"CULT\" in the last 168 hours.  Recent Results (from the past 24 hours)   XR Chest Port 1 View    Narrative    EXAM: XR CHEST PORT 1 VIEW  LOCATION: [facility]  DATE: 11/18/2024    INDICATION: Post Op CVTS Surgery  COMPARISON: Chest radiograph 11/18/2024.      Impression    IMPRESSION:     Lines and tubes: Intra-aortic balloon pump marker now near the distal arch/upper descending thoracic aorta. [place]-Aiden catheter projects over expected main pulmonary artery. Endotracheal tube tip 4.2 cm above the julisa. Median sternotomy. Spinal   stimulator. Chest tubes.    No significant pleural effusion or discernible pneumothorax. Similar heart size.   XR Chest Port 1 View    Narrative    EXAM: XR CHEST PORT 1 VIEW  LOCATION: [facility]  DATE: 11/19/2024    INDICATION: Confirm IABP  placement  COMPARISON: November 18, 2024.      Impression    IMPRESSION: Intra-aortic balloon pump marker lies at the level of the proximal descending aorta. Endotracheal tube 30 mm above the julisa. Pulmonary arterial catheter with its tip in the right ventricle. Mediastinal and bilateral chest tubes. No   pneumothorax. The lungs are clear.         Billing: This patient is critically ill: Yes. Total critical care time today 35 min exclusive of procedures or teaching.          "

## 2024-11-19 NOTE — PROGRESS NOTES
CT EXTUBATION FAST TRACK:    [facility] - ICU     FastTrack Candidate: No,  Extubation Goal Time ( 24 Hours ) 2205     Patient Status: Remains Intubated, Continue with 24 Goal time of 2205     Care team decided to keep pt sedated & intubated d/t pt's continued need for IABP overnight .

## 2024-11-19 NOTE — PROCEDURES
Brief CV Surgery Note        IABP removed at 0948. Confirmed helium tubing was disconnected prior to removal per IABP tech. Brief free bleeding allowed, followed by 20 minutes of manual pressure. Mild increase in pressor needs following removal BP w/ MAP > 65 on reasonable epi dosing. Posterior tibial and dorsalis pedis pulses intact by doppler and obliterated w/ pressure. Femstop applied w/ 145 mmHg pressure and should be allowed to deflate on its own. Flat bedrest x6 hours post removal (approx 1545), discussed w/ RN. CBC ordered for this PM. No immediate complications.     Ernestina Barr PA-C  [facility] Cardiothoracic Surgery  Pager: [phone]  November 19, 2024

## 2024-11-19 NOTE — PROGRESS NOTES
FiO2 (%): 40 %, Resp: 18, Vent Mode: CMV/AC, Resp Rate (Set): 18 breaths/min, Tidal Volume (Set, mL): (S) 550 mL (per md paris), PEEP (cm H2O): 5 cmH2O, Resp Rate (Set): 18 breaths/min, Tidal Volume (Set, mL): (S) 550 mL (per md paris), PEEP (cm H2O): 5 cmH2O   Pt will remain on full vent support tonight no weaning.   Vent changes made tidal volume decreased to 550, 02 decreased to 40%. ETT securement changed from tape to E-tad. Breath sounds diminished coarse, suctioned for scant clear thin secretions, pt did not tolerate suctioning very well, became very anxious.  Rt continue to monitor.    Shahab Leach,  RT

## 2024-11-20 ENCOUNTER — APPOINTMENT (OUTPATIENT)
Dept: RADIOLOGY | Facility: HOSPITAL | Age: 79
End: 2024-11-20
Payer: COMMERCIAL

## 2024-11-20 LAB
ANION GAP SERPL CALCULATED.3IONS-SCNC: 16 MMOL/L (ref 7–15)
BASE EXCESS BLDA CALC-SCNC: -3.4 MMOL/L (ref -3–3)
BASE EXCESS BLDA CALC-SCNC: -3.5 MMOL/L (ref -3–3)
BASE EXCESS BLDV CALC-SCNC: -2.7 MMOL/L (ref -3–3)
BUN SERPL-MCNC: 23.2 MG/DL (ref 8–23)
CA-I BLD-MCNC: 4.7 MG/DL (ref 4.4–5.2)
CALCIUM SERPL-MCNC: 8.3 MG/DL (ref 8.8–10.4)
CHLORIDE SERPL-SCNC: 109 MMOL/L (ref 98–107)
COHGB MFR BLD: 97.9 % (ref 95–96)
COHGB MFR BLD: 98.4 % (ref 95–96)
CREAT SERPL-MCNC: 1.49 MG/DL (ref 0.67–1.17)
EGFRCR SERPLBLD CKD-EPI 2021: 47 ML/MIN/1.73M2
ERYTHROCYTE [DISTWIDTH] IN BLOOD BY AUTOMATED COUNT: 16.3 % (ref 10–15)
GLUCOSE BLDC GLUCOMTR-MCNC: 105 MG/DL (ref 70–99)
GLUCOSE BLDC GLUCOMTR-MCNC: 108 MG/DL (ref 70–99)
GLUCOSE BLDC GLUCOMTR-MCNC: 108 MG/DL (ref 70–99)
GLUCOSE BLDC GLUCOMTR-MCNC: 119 MG/DL (ref 70–99)
GLUCOSE BLDC GLUCOMTR-MCNC: 119 MG/DL (ref 70–99)
GLUCOSE BLDC GLUCOMTR-MCNC: 120 MG/DL (ref 70–99)
GLUCOSE BLDC GLUCOMTR-MCNC: 124 MG/DL (ref 70–99)
GLUCOSE BLDC GLUCOMTR-MCNC: 164 MG/DL (ref 70–99)
GLUCOSE BLDC GLUCOMTR-MCNC: 175 MG/DL (ref 70–99)
GLUCOSE BLDC GLUCOMTR-MCNC: 95 MG/DL (ref 70–99)
GLUCOSE SERPL-MCNC: 121 MG/DL (ref 70–99)
HCO3 BLD-SCNC: 19 MMOL/L (ref 21–28)
HCO3 BLD-SCNC: 21 MMOL/L (ref 21–28)
HCO3 BLDV-SCNC: 21 MMOL/L (ref 21–28)
HCO3 SERPL-SCNC: 18 MMOL/L (ref 22–29)
HCT VFR BLD AUTO: 33.6 % (ref 40–53)
HGB BLD-MCNC: 10.4 G/DL (ref 13.3–17.7)
HGB BLD-MCNC: 10.5 G/DL (ref 13.3–17.7)
MAGNESIUM SERPL-MCNC: 2.2 MG/DL (ref 1.7–2.3)
MCH RBC QN AUTO: 28.6 PG (ref 26.5–33)
MCHC RBC AUTO-ENTMCNC: 31.3 G/DL (ref 31.5–36.5)
MCV RBC AUTO: 92 FL (ref 78–100)
O2/TOTAL GAS SETTING VFR VENT: 28 %
O2/TOTAL GAS SETTING VFR VENT: 30 %
O2/TOTAL GAS SETTING VFR VENT: 30 %
OXYHGB MFR BLDV: 74 % (ref 70–75)
PCO2 BLD: 25 MM HG (ref 35–45)
PCO2 BLD: 34 MM HG (ref 35–45)
PCO2 BLDV: 32 MM HG (ref 40–50)
PEEP: 5 CM H2O
PH BLD: 7.39 [PH] (ref 7.35–7.45)
PH BLD: 7.49 [PH] (ref 7.35–7.45)
PH BLDV: 7.43 [PH] (ref 7.32–7.43)
PHOSPHATE SERPL-MCNC: 4.5 MG/DL (ref 2.5–4.5)
PLATELET # BLD AUTO: 239 10E3/UL (ref 150–450)
PO2 BLD: 89 MM HG (ref 80–105)
PO2 BLD: 96 MM HG (ref 80–105)
PO2 BLDV: 41 MM HG (ref 25–47)
POTASSIUM SERPL-SCNC: 3.8 MMOL/L (ref 3.4–5.3)
POTASSIUM SERPL-SCNC: 3.8 MMOL/L (ref 3.4–5.3)
RBC # BLD AUTO: 3.67 10E6/UL (ref 4.4–5.9)
SAO2 % BLDA: 96 % (ref 92–100)
SAO2 % BLDA: 97 % (ref 92–100)
SAO2 % BLDV: 74.9 % (ref 70–75)
SODIUM SERPL-SCNC: 143 MMOL/L (ref 135–145)
WBC # BLD AUTO: 11.8 10E3/UL (ref 4–11)

## 2024-11-20 PROCEDURE — 80048 BASIC METABOLIC PNL TOTAL CA: CPT | Performed by: THORACIC SURGERY (CARDIOTHORACIC VASCULAR SURGERY)

## 2024-11-20 PROCEDURE — 999N000259 HC STATISTIC EXTUBATION

## 2024-11-20 PROCEDURE — 94003 VENT MGMT INPAT SUBQ DAY: CPT

## 2024-11-20 PROCEDURE — 84100 ASSAY OF PHOSPHORUS: CPT | Performed by: THORACIC SURGERY (CARDIOTHORACIC VASCULAR SURGERY)

## 2024-11-20 PROCEDURE — 83735 ASSAY OF MAGNESIUM: CPT | Performed by: THORACIC SURGERY (CARDIOTHORACIC VASCULAR SURGERY)

## 2024-11-20 PROCEDURE — 250N000013 HC RX MED GY IP 250 OP 250 PS 637

## 2024-11-20 PROCEDURE — 85018 HEMOGLOBIN: CPT | Performed by: THORACIC SURGERY (CARDIOTHORACIC VASCULAR SURGERY)

## 2024-11-20 PROCEDURE — 999N000253 HC STATISTIC WEANING TRIALS

## 2024-11-20 PROCEDURE — 84132 ASSAY OF SERUM POTASSIUM: CPT | Performed by: THORACIC SURGERY (CARDIOTHORACIC VASCULAR SURGERY)

## 2024-11-20 PROCEDURE — 82805 BLOOD GASES W/O2 SATURATION: CPT | Performed by: THORACIC SURGERY (CARDIOTHORACIC VASCULAR SURGERY)

## 2024-11-20 PROCEDURE — 82330 ASSAY OF CALCIUM: CPT

## 2024-11-20 PROCEDURE — 250N000013 HC RX MED GY IP 250 OP 250 PS 637: Performed by: INTERNAL MEDICINE

## 2024-11-20 PROCEDURE — 200N000001 HC R&B ICU

## 2024-11-20 PROCEDURE — 999N000009 HC STATISTIC AIRWAY CARE

## 2024-11-20 PROCEDURE — 999N000157 HC STATISTIC RCP TIME EA 10 MIN

## 2024-11-20 PROCEDURE — 80048 BASIC METABOLIC PNL TOTAL CA: CPT

## 2024-11-20 PROCEDURE — 85014 HEMATOCRIT: CPT

## 2024-11-20 PROCEDURE — 85041 AUTOMATED RBC COUNT: CPT

## 2024-11-20 PROCEDURE — 99233 SBSQ HOSP IP/OBS HIGH 50: CPT | Mod: 24 | Performed by: INTERNAL MEDICINE

## 2024-11-20 PROCEDURE — 94799 UNLISTED PULMONARY SVC/PX: CPT

## 2024-11-20 PROCEDURE — 999N000055 HC STATISTIC END TITIAL CO2 MONITORING

## 2024-11-20 PROCEDURE — 250N000011 HC RX IP 250 OP 636: Performed by: THORACIC SURGERY (CARDIOTHORACIC VASCULAR SURGERY)

## 2024-11-20 PROCEDURE — 82805 BLOOD GASES W/O2 SATURATION: CPT

## 2024-11-20 PROCEDURE — 250N000012 HC RX MED GY IP 250 OP 636 PS 637

## 2024-11-20 PROCEDURE — 250N000009 HC RX 250

## 2024-11-20 PROCEDURE — 250N000011 HC RX IP 250 OP 636

## 2024-11-20 PROCEDURE — 71045 X-RAY EXAM CHEST 1 VIEW: CPT

## 2024-11-20 RX ORDER — KETOROLAC TROMETHAMINE 15 MG/ML
15 INJECTION, SOLUTION INTRAMUSCULAR; INTRAVENOUS
Status: COMPLETED | OUTPATIENT
Start: 2024-11-20 | End: 2024-11-20

## 2024-11-20 RX ORDER — POTASSIUM CHLORIDE 29.8 MG/ML
20 INJECTION INTRAVENOUS ONCE
Status: COMPLETED | OUTPATIENT
Start: 2024-11-20 | End: 2024-11-20

## 2024-11-20 RX ORDER — METHOCARBAMOL 500 MG/1
500 TABLET, FILM COATED ORAL 4 TIMES DAILY PRN
Status: DISCONTINUED | OUTPATIENT
Start: 2024-11-20 | End: 2024-11-26 | Stop reason: HOSPADM

## 2024-11-20 RX ORDER — MINERAL OIL AND WHITE PETROLATUM 150; 830 MG/G; MG/G
1 OINTMENT OPHTHALMIC 2 TIMES DAILY PRN
Status: DISCONTINUED | OUTPATIENT
Start: 2024-11-20 | End: 2024-11-26 | Stop reason: HOSPADM

## 2024-11-20 RX ORDER — FUROSEMIDE 10 MG/ML
40 INJECTION INTRAMUSCULAR; INTRAVENOUS 3 TIMES DAILY
Status: DISCONTINUED | OUTPATIENT
Start: 2024-11-20 | End: 2024-11-21

## 2024-11-20 RX ADMIN — HYDROMORPHONE HYDROCHLORIDE 0.4 MG: 0.2 INJECTION, SOLUTION INTRAMUSCULAR; INTRAVENOUS; SUBCUTANEOUS at 16:12

## 2024-11-20 RX ADMIN — DEXMEDETOMIDINE HYDROCHLORIDE 0.5 MCG/KG/HR: 400 INJECTION INTRAVENOUS at 00:52

## 2024-11-20 RX ADMIN — HYDRALAZINE HYDROCHLORIDE 10 MG: 20 INJECTION INTRAMUSCULAR; INTRAVENOUS at 18:21

## 2024-11-20 RX ADMIN — FERROUS GLUCONATE 324 MG: 324 TABLET ORAL at 11:23

## 2024-11-20 RX ADMIN — HYDROMORPHONE HYDROCHLORIDE 0.4 MG: 0.2 INJECTION, SOLUTION INTRAMUSCULAR; INTRAVENOUS; SUBCUTANEOUS at 13:53

## 2024-11-20 RX ADMIN — OXYCODONE HYDROCHLORIDE 5 MG: 5 TABLET ORAL at 22:23

## 2024-11-20 RX ADMIN — FUROSEMIDE 40 MG: 10 INJECTION, SOLUTION INTRAMUSCULAR; INTRAVENOUS at 11:40

## 2024-11-20 RX ADMIN — MINERAL OIL AND WHITE PETROLATUM 1 G: 150; 830 OINTMENT OPHTHALMIC at 01:19

## 2024-11-20 RX ADMIN — Medication 50 MCG: at 05:24

## 2024-11-20 RX ADMIN — HYDROMORPHONE HYDROCHLORIDE 0.4 MG: 0.2 INJECTION, SOLUTION INTRAMUSCULAR; INTRAVENOUS; SUBCUTANEOUS at 18:18

## 2024-11-20 RX ADMIN — POTASSIUM CHLORIDE 20 MEQ: 29.8 INJECTION, SOLUTION INTRAVENOUS at 05:09

## 2024-11-20 RX ADMIN — ACETAMINOPHEN 975 MG: 325 TABLET ORAL at 18:02

## 2024-11-20 RX ADMIN — HEPARIN SODIUM 5000 UNITS: 5000 INJECTION, SOLUTION INTRAVENOUS; SUBCUTANEOUS at 11:24

## 2024-11-20 RX ADMIN — HYDRALAZINE HYDROCHLORIDE 10 MG: 20 INJECTION INTRAMUSCULAR; INTRAVENOUS at 15:22

## 2024-11-20 RX ADMIN — URSODIOL 300 MG: 300 CAPSULE ORAL at 20:03

## 2024-11-20 RX ADMIN — HYDROMORPHONE HYDROCHLORIDE 0.4 MG: 0.2 INJECTION, SOLUTION INTRAMUSCULAR; INTRAVENOUS; SUBCUTANEOUS at 20:16

## 2024-11-20 RX ADMIN — INSULIN GLARGINE 16 UNITS: 100 INJECTION, SOLUTION SUBCUTANEOUS at 10:54

## 2024-11-20 RX ADMIN — ACETAMINOPHEN 650 MG: 650 SUPPOSITORY RECTAL at 03:20

## 2024-11-20 RX ADMIN — FUROSEMIDE 40 MG: 10 INJECTION, SOLUTION INTRAMUSCULAR; INTRAVENOUS at 07:47

## 2024-11-20 RX ADMIN — LIDOCAINE 2 PATCH: 4 PATCH TOPICAL at 18:02

## 2024-11-20 RX ADMIN — FUROSEMIDE 40 MG: 10 INJECTION, SOLUTION INTRAMUSCULAR; INTRAVENOUS at 18:02

## 2024-11-20 RX ADMIN — PANTOPRAZOLE SODIUM 40 MG: 40 TABLET, DELAYED RELEASE ORAL at 11:22

## 2024-11-20 RX ADMIN — NICARDIPINE HYDROCHLORIDE 7.5 MG/HR: 0.2 INJECTION, SOLUTION INTRAVENOUS at 09:05

## 2024-11-20 RX ADMIN — SODIUM BICARBONATE 650 MG TABLET 1950 MG: at 14:12

## 2024-11-20 RX ADMIN — ACETAMINOPHEN 975 MG: 325 TABLET ORAL at 11:20

## 2024-11-20 RX ADMIN — SENNOSIDES AND DOCUSATE SODIUM 1 TABLET: 8.6; 5 TABLET ORAL at 20:03

## 2024-11-20 RX ADMIN — METOPROLOL TARTRATE 12.5 MG: 25 TABLET, FILM COATED ORAL at 20:03

## 2024-11-20 RX ADMIN — OXYCODONE HYDROCHLORIDE 5 MG: 5 TABLET ORAL at 15:50

## 2024-11-20 RX ADMIN — URSODIOL 300 MG: 300 CAPSULE ORAL at 11:23

## 2024-11-20 RX ADMIN — POTASSIUM CHLORIDE 20 MEQ: 29.8 INJECTION, SOLUTION INTRAVENOUS at 11:59

## 2024-11-20 RX ADMIN — METOPROLOL TARTRATE 12.5 MG: 25 TABLET, FILM COATED ORAL at 11:22

## 2024-11-20 RX ADMIN — INSULIN ASPART 2 UNITS: 100 INJECTION, SOLUTION INTRAVENOUS; SUBCUTANEOUS at 16:16

## 2024-11-20 RX ADMIN — HEPARIN SODIUM 5000 UNITS: 5000 INJECTION, SOLUTION INTRAVENOUS; SUBCUTANEOUS at 04:40

## 2024-11-20 RX ADMIN — METHOCARBAMOL TABLETS 500 MG: 500 TABLET, COATED ORAL at 17:08

## 2024-11-20 RX ADMIN — METOPROLOL TARTRATE 12.5 MG: 25 TABLET, FILM COATED ORAL at 14:17

## 2024-11-20 RX ADMIN — ALLOPURINOL 100 MG: 100 TABLET ORAL at 11:22

## 2024-11-20 RX ADMIN — HEPARIN SODIUM 5000 UNITS: 5000 INJECTION, SOLUTION INTRAVENOUS; SUBCUTANEOUS at 20:03

## 2024-11-20 RX ADMIN — HYDRALAZINE HYDROCHLORIDE 10 MG: 20 INJECTION INTRAMUSCULAR; INTRAVENOUS at 22:23

## 2024-11-20 RX ADMIN — HYDROMORPHONE HYDROCHLORIDE 0.2 MG: 0.2 INJECTION, SOLUTION INTRAMUSCULAR; INTRAVENOUS; SUBCUTANEOUS at 10:06

## 2024-11-20 RX ADMIN — KETOROLAC TROMETHAMINE 15 MG: 15 INJECTION, SOLUTION INTRAMUSCULAR; INTRAVENOUS at 10:41

## 2024-11-20 RX ADMIN — SODIUM BICARBONATE 650 MG TABLET 1950 MG: at 20:03

## 2024-11-20 RX ADMIN — FINASTERIDE 5 MG: 5 TABLET, FILM COATED ORAL at 11:23

## 2024-11-20 RX ADMIN — ATORVASTATIN CALCIUM 40 MG: 40 TABLET, FILM COATED ORAL at 20:03

## 2024-11-20 RX ADMIN — ASPIRIN 81 MG CHEWABLE TABLET 162 MG: 81 TABLET CHEWABLE at 11:20

## 2024-11-20 RX ADMIN — METHOCARBAMOL TABLETS 500 MG: 500 TABLET, COATED ORAL at 22:23

## 2024-11-20 RX ADMIN — HYDROMORPHONE HYDROCHLORIDE 0.2 MG: 0.2 INJECTION, SOLUTION INTRAMUSCULAR; INTRAVENOUS; SUBCUTANEOUS at 10:19

## 2024-11-20 RX ADMIN — SENNOSIDES AND DOCUSATE SODIUM 1 TABLET: 8.6; 5 TABLET ORAL at 11:22

## 2024-11-20 RX ADMIN — GABAPENTIN 300 MG: 300 CAPSULE ORAL at 10:09

## 2024-11-20 RX ADMIN — METOPROLOL TARTRATE 12.5 MG: 25 TABLET, FILM COATED ORAL at 18:02

## 2024-11-20 RX ADMIN — CHLORHEXIDINE GLUCONATE 15 ML: 1.2 SOLUTION ORAL at 07:47

## 2024-11-20 RX ADMIN — CHLORHEXIDINE GLUCONATE 15 ML: 1.2 SOLUTION ORAL at 20:02

## 2024-11-20 RX ADMIN — POLYETHYLENE GLYCOL 3350 17 G: 17 POWDER, FOR SOLUTION ORAL at 11:23

## 2024-11-20 RX ADMIN — HYDRALAZINE HYDROCHLORIDE 10 MG: 20 INJECTION INTRAMUSCULAR; INTRAVENOUS at 20:16

## 2024-11-20 NOTE — PROGRESS NOTES
AM ABG showing respiratory alkalosis (pH 7.49, CO2 25, Bicarb 19). Ventilator rate decreased from 18 to 14 bpm, and fentanyl boluses from pump ordered for breakthrough sedation needs during episodes of tachypnea, per Provider. Angel Flores RN

## 2024-11-20 NOTE — PROGRESS NOTES
Care Management Follow Up    Length of Stay (days): 6    Expected Discharge Date: 11/25/2024    Anticipated Discharge Plan:   TBD    Transportation: TBD    PT Recommendations:    OT Recommendations:        Barriers to Discharge: medical stability, post procedure care and monitoring    Prior Living Situation:   Pt lives alone at [facility] in [place]. Pt Ind with ADLS, uses walker (has w/c if needs, but hasn't been using). Pt is dependent with some IADLs [facility] provides meals, laundry svcs, escorts to meals, and medication set up. Pt's son Omari transports pt if needed, helps with shopping, and appointments. Pt is retired. No home care svcs at this time. Had PT/OT through the [facility]. Pt's MORENA anticipates pt returns to [facility], with home care if needed. Son Omari to transport if safe to do so.     NISA Tan at [facility] #[phone]        Discussed  Partnership in Safe Discharge Planning  document with patient/family: No     Handoff Completed: No, handoff not indicated or clinically appropriate    Patient/Spokesperson Updated: No    Additional Information:  Medical:  Patient with PMHx of DM, CVA without residual symptoms, Gout, BPH, HTN, CAD with multivessel disease s/p CABG x 2 on 11/18.    CT Surgery following.        11/20/24:  Patient is not medically stable to initiate discharge planning.          Tasha Pappas RN

## 2024-11-20 NOTE — PLAN OF CARE
[facility] - ICU    RN Progress Note:            Pertinent Assessments:      Please refer to flowsheet rows for full assessment     Assumed care of patient at 1430. Reducing sedation for vent weaning. Vitals stable, see MAR/flow sheet.            Key Events - This Shift:       Attempted vent wean 2x, pt tachypnic.                Barriers to Discharge / Downgrade:     Vent, cardiac drips         Point of Contact Update: YES-OR-NO: Yes  Name:sister in law,in person  Summary of Conversation: updated on plan of care and pt condition

## 2024-11-20 NOTE — PROGRESS NOTES
Patient switched back to AC VC 18 550 30% +5 due to increased RR and unresolved anxiety/patient nodded head when asked if he needed a break. Patient weaned for 100 minutes. RT will follow.    Asher De Los Santos, RT  11/20/24

## 2024-11-20 NOTE — PROGRESS NOTES
CVTS Daily Progress Note   POD# 2  s/p CABG x3 (LIMA>LAD, rSVG>RI, rSVG>distal OM) , LLE EVH w/ IABP in place  Attending: Nathan  LOS: 6    SUBJECTIVE/INTERVAL EVENTS:    No acute events overnight.. Patient progressing well. Just extubated, maintaining oxygen saturations on nasal cannula. Normotensive off pressors. Ambulating with therapy deferred d/t above. Pain well controlled. Increasing agitation overnight, required versed bump to assist w/ anxiety. - BM / - flatus. NPO d/t above. UOP  brisk w/ lasix administration . Chest tube output appropriate. Hgb 10.4. Patient unable to participate in rounds d/t above.     OBJECTIVE:  Temp:  [98.4  F (36.9  C)-100.2  F (37.9  C)] 98.4  F (36.9  C)  Pulse:  [72-92] 87  Resp:  [14-42] 14  BP: (109-128)/(42-51) 128/51  MAP:  [62 mmHg-123 mmHg] 75 mmHg  Arterial Line BP: (101-141)/() 126/52  FiO2 (%):  [30 %] 30 %  SpO2:  [95 %-100 %] 100 %  Vitals:    11/16/24 0440 11/16/24 0441 11/17/24 0200 11/18/24 0500   Weight: 90.2 kg (198 lb 12.8 oz) 90.2 kg (198 lb 12.8 oz) 91.4 kg (201 lb 8 oz) 92.4 kg (203 lb 11.2 oz)    11/19/24 0200   Weight: 93.4 kg (205 lb 14.6 oz)       Clinically Significant Risk Factors          # Hyperchloremia: Highest Cl = 109 mmol/L in last 2 days, will monitor as appropriate      # Hypocalcemia: Lowest iCa = 4.3 mg/dL in last 2 days, will monitor and replace as appropriate     # Hypoalbuminemia: Lowest albumin = 2.6 g/dL at 11/16/2024  4:06 AM, will monitor as appropriate  # Coagulation Defect: INR = 1.62 (Ref range: 0.85 - 1.15) and/or PTT = 47 Seconds (Ref range: 22 - 38 Seconds), will monitor for bleeding    # Hypertension: Noted on problem list    # Acute heart failure with reduced ejection fraction: last echo with EF <40% and receiving IV diuretics    # Acute Hypercapnic Respiratory Failure: based on venous blood gas results.  Continue supplemental oxygen and ventilatory support as indicated.        # DMII: A1C = 9.1 % (Ref range: <5.7 %)  within past 6 months   # Overweight: Estimated body mass index is 27.93 kg/m  as calculated from the following:    Height as of this encounter: 1.829 m (6').    Weight as of this encounter: 93.4 kg (205 lb 14.6 oz).        # Financial/Environmental Concerns:     # History of CABG: noted on surgical history               Current Medications:    Scheduled Meds:  Current Facility-Administered Medications   Medication Dose Route Frequency Provider Last Rate Last Admin    acetaminophen (TYLENOL) tablet 975 mg  975 mg Oral Q8H Shania Barr PA-C        Or    acetaminophen (TYLENOL) Suppository 650 mg  650 mg Rectal Q8H Bellae Shania K, PA-C   650 mg at 11/20/24 0320    allopurinol (ZYLOPRIM) tablet 100 mg  100 mg Oral Daily Shania Barr PA-C        aspirin (ASA) chewable tablet 162 mg  162 mg Oral or NG Tube Daily Shania Barr PA-C        Or    aspirin (ASA) Suppository 300 mg  300 mg Rectal Daily Shania Barr PA-C   300 mg at 11/19/24 0917    atorvastatin (LIPITOR) tablet 40 mg  40 mg Oral QPM Shania Barr PA-C   40 mg at 11/17/24 2033    chlorhexidine (PERIDEX) 0.12 % solution 15 mL  15 mL Swish & Spit Q12H [facility] (08/20) Ryan Hallman MD   15 mL at 11/20/24 0747    coagulation Factor VIIa recomb (NOVOSEVEN RT) injection 2,000 mcg  2,000 mcg Intravenous Once Tsering Evans MD        ferrous gluconate (FERGON) tablet 324 mg  324 mg Oral Daily Shania Barr PA-C        finasteride (PROSCAR) tablet 5 mg  5 mg Oral Daily Shania Barr PA-C        furosemide (LASIX) injection 40 mg  40 mg Intravenous TID Shania Barr PA-C   40 mg at 11/20/24 0747    [Held by provider] glipiZIDE (GLUCOTROL XL) 24 hr tablet 2.5 mg  2.5 mg Oral Daily Shania Barr PA-C        heparin ANTICOAGULANT injection 5,000 Units  5,000 Units Subcutaneous Q8H Shania Barr PA-C   5,000 Units at 11/20/24 0440    Lidocaine (LIDOCARE) 4 % Patch 1-2 patch  1-2 patch  Transdermal Q24H Shania Barr PA-C   1 patch at 11/19/24 1639    pantoprazole (PROTONIX) 2 mg/mL suspension 40 mg  40 mg Oral or NG Tube QAM AC Shania Barr PA-C        Or    pantoprazole (PROTONIX) EC tablet 40 mg  40 mg Oral QAM AC Shania Barr PA-C        polyethylene glycol (MIRALAX) Packet 17 g  17 g Oral Daily Shania Barr PA-C        senna-docusate (SENOKOT-S/PERICOLACE) 8.6-50 MG per tablet 1 tablet  1 tablet Oral BID Shania Barr PA-C        [Held by provider] sodium bicarbonate tablet 1,950 mg  1,950 mg Oral TID Shania Barr PA-C        ursodiol (ACTIGALL) capsule 300 mg  300 mg Oral BID Shania Barr PA-C         Continuous Infusions:  Current Facility-Administered Medications   Medication Dose Route Frequency Provider Last Rate Last Admin    dexmedeTOMIDine (PRECEDEX) 4 mcg/mL in sodium chloride 0.9 % 100 mL infusion  0.1-1.2 mcg/kg/hr Intravenous Continuous Shania Barr PA-C   Stopped at 11/20/24 0815    EPINEPHrine (ADRENALIN) 5 mg in sodium chloride 0.9 % 250 mL infusion CENTRAL  0.01-0.1 mcg/kg/min Intravenous Continuous PRN Shania Barr PA-C   Stopped at 11/20/24 0823    fentaNYL (SUBLIMAZE) infusion   mcg/hr Intravenous Continuous Genie Mcdermott DO   Stopped at 11/20/24 0722    insulin regular (MYXREDLIN) 1 unit/mL infusion  0-24 Units/hr Intravenous Continuous Shania Barr PA-C 1.5 mL/hr at 11/20/24 0808 1.5 Units/hr at 11/20/24 0808    niCARdipine 40 mg in 200 mL NS (CARDENE) infusion  0.5-15 mg/hr Intravenous Continuous PRN Shania Barr PA-C 12.5 mL/hr at 11/20/24 0840 2.5 mg/hr at 11/20/24 0840    No anticoagulation (IABP 1:1)   Does not apply DOES NOT GO TO Shania Rueda PA-C        phenylephrine (ADENIKE-SYNEPHRINE) 50 mg in NaCl 0.9 % 250 mL infusion  0.1-4 mcg/kg/min Intravenous Continuous PRN Shania Barr PA-C        Reason beta blocker order not selected   Does not  apply DOES NOT GO TO Shania Rueda PA-C        sodium chloride 0.9 % infusion   Intravenous Continuous Shania Barr PA-C 30 mL/hr at 11/18/24 1630 Rate Verify at 11/18/24 1630     PRN Meds:.  Current Facility-Administered Medications   Medication Dose Route Frequency Provider Last Rate Last Admin    [START ON 11/21/2024] acetaminophen (TYLENOL) tablet 650 mg  650 mg Oral Q4H PRN Shania Barr PA-C        albumin human 5 % injection 12.5 g  12.5 g Intravenous Q15 Min PRN Tsering Evans MD   12.5 g at 11/18/24 2132    [START ON 11/21/2024] bisacodyl (DULCOLAX) suppository 10 mg  10 mg Rectal Daily PRN Shania Barr PA-C        [Held by provider] calcium carbonate (TUMS) chewable tablet 1,000 mg  1,000 mg Oral TID PRN Shania Barr PA-C        calcium gluconate 1 g in 50 mL in sodium chloride intermittent infusion  1 g Intravenous Once PRN Shania Barr PA-C   1 g at 11/18/24 1928    calcium gluconate 2 g in  mL intermittent infusion  2 g Intravenous Once PRN Shania Barr PA-C        calcium gluconate 3 g in sodium chloride 0.9 % 100 mL intermittent infusion  3 g Intravenous Once PRN Shania Barr PA-C        glucose gel 15-30 g  15-30 g Oral Q15 Min PRN Shania Barr PA-C        Or    dextrose 50 % injection 25-50 mL  25-50 mL Intravenous Q15 Min PRN Shania Barr PA-C        Or    glucagon injection 1 mg  1 mg Subcutaneous Q15 Min PRN Shania Barr PA-C        EPINEPHrine (ADRENALIN) 5 mg in sodium chloride 0.9 % 250 mL infusion CENTRAL  0.01-0.1 mcg/kg/min Intravenous Continuous PRN Shania Barr PA-C   Stopped at 11/20/24 0823    fentaNYL (SUBLIMAZE) 50 mcg/mL bolus from pump  50 mcg Intravenous Q1H PRN Laverne Matos MD   50 mcg at 11/20/24 0524    gabapentin (NEURONTIN) capsule 300 mg  300 mg Oral TID PRN Shania Barr PA-C        hydrALAZINE (APRESOLINE) injection 10 mg  10 mg Intravenous Q30  Min PRN Shania Barr PA-C        HYDROmorphone (DILAUDID) injection 0.2 mg  0.2 mg Intravenous Q2H PRN Shania Barr PA-C   0.2 mg at 11/19/24 2003    Or    HYDROmorphone (DILAUDID) injection 0.4 mg  0.4 mg Intravenous Q2H PRN Shania Barr PA-C        lactated ringers BOLUS 250 mL  250 mL Intravenous Q15 Min PRN Shania Barr PA-C        LubriFresh P.M. OINT 1 g  1 Application Ophthalmic BID PRN Laverne Matos MD   1 g at 11/20/24 0119    magnesium hydroxide (MILK OF MAGNESIA) suspension 30 mL  30 mL Oral Daily PRN Shania Barr PA-C        melatonin tablet 5 mg  5 mg Oral At Bedtime PRN Shania Barr PA-C        midazolam (VERSED) injection 2 mg  2 mg Intravenous Q1H PRN Genie Mcdermott DO   2 mg at 11/19/24 1218    naloxone (NARCAN) injection 0.2 mg  0.2 mg Intravenous Q2 Min PRN Shania Barr PA-C        Or    naloxone (NARCAN) injection 0.4 mg  0.4 mg Intravenous Q2 Min PRN Shania Barr PA-C        Or    naloxone (NARCAN) injection 0.2 mg  0.2 mg Intramuscular Q2 Min PRN Shania Barr PA-C        Or    naloxone (NARCAN) injection 0.4 mg  0.4 mg Intramuscular Q2 Min PRN Shania Barr PA-C        niCARdipine 40 mg in 200 mL NS (CARDENE) infusion  0.5-15 mg/hr Intravenous Continuous PRN Shania Barr PA-C 12.5 mL/hr at 11/20/24 0840 2.5 mg/hr at 11/20/24 0840    No anticoagulation (IABP 1:1)   Does not apply DOES NOT GO TO Shania Rueda PA-C        ondansetron (ZOFRAN ODT) ODT tab 4 mg  4 mg Oral Q6H PRN Shania Barr PA-C        Or    ondansetron (ZOFRAN) injection 4 mg  4 mg Intravenous Q6H PRN Shania Barr PA-C        oxyCODONE (ROXICODONE) tablet 5 mg  5 mg Oral Q4H PRN Shania Barr PA-C        Or    oxyCODONE (ROXICODONE) tablet 10 mg  10 mg Oral Q4H PRN Shania Barr PA-C        phenylephrine (ADENIKE-SYNEPHRINE) 50 mg in NaCl 0.9 % 250 mL infusion  0.1-4 mcg/kg/min Intravenous  Continuous PRN Shania Barr PA-C        prochlorperazine (COMPAZINE) injection 5 mg  5 mg Intravenous Q6H PRN Shania Barr PA-C        Or    prochlorperazine (COMPAZINE) tablet 5 mg  5 mg Oral Q6H PRN Shania Barr PA-C        Reason beta blocker order not selected   Does not apply DOES NOT GO TO Shania Rueda PA-C           Cardiographics:    Telemetry monitoring demonstrates sinus rhythm w/ rates in the 80s per my personal review.    Imaging:  Results for orders placed or performed during the hospital encounter of 11/14/24   US Carotid Bilateral    Impression    IMPRESSION:  1.  Mild plaque formation, velocities consistent with less than 50% stenosis in the right internal carotid artery.  2.  Mild plaque formation, velocities consistent with less than 50% stenosis in the left internal carotid artery.  3.  Flow within the vertebral arteries is antegrade.   CT Chest w/o Contrast    Impression    IMPRESSION:   1.  Heavy atherosclerotic calcification aortic root and proximal ascending aorta.  2.  Moderate right and small left pleural effusions with associated compressive atelectasis/consolidation in the lung bases. Consolidative opacity left lower lobe somewhat rounded, likely representing atelectasis.  3.  Mildly enlarged mediastinal lymph nodes likely reactive.  4.  Otherwise stable exam.     PET Cardiac Viability    Impression    IMPRESSION:    Findings suggesting stunned, but viable left ventricular myocardium.   XR Chest Port 1 View    Impression    IMPRESSION: Cardiomegaly. Marker for balloon pump in the aortic arch. This could be pulled back approximately 2 cm. Findings discussed with ( nurse Key    ) clinical service at approximately 0605 hours. Cardiomegaly with mild-to-moderate pulmonary   vascular congestion. Neurostimulator leads in stable position. Small right pleural effusion suspected. The costophrenic angle has been omitted on this exam. Patchy area of atelectasis  or infiltrate in the left lung base.     XR Chest Port 1 View    Impression    IMPRESSION: Radiopaque marker of the intra-aortic balloon pump at the level of the mid thoracic arch, unchanged. Mild cardiac enlargement and venous hypertension, unchanged. No significant pleural effusion on either side. Postoperative changes left   shoulder. Degenerative changes both shoulders and the spine, narrowing worse involving the left glenohumeral joint with remodeling of the left humeral head. Electrodes overlying the lower thoracic spine. Monitoring leads overlying the chest.       Labs, personally reviewed.  Hemoglobin   Date Value Ref Range Status   11/20/2024 10.4 (L) 13.3 - 17.7 g/dL Final   11/20/2024 10.5 (L) 13.3 - 17.7 g/dL Final   11/19/2024 10.6 (L) 13.3 - 17.7 g/dL Final   04/29/2021 12.2 (L) 13.3 - 17.7 g/dL Final   07/28/2020 10.7 (L) 13.3 - 17.7 g/dL Final   07/27/2020 11.7 (L) 13.3 - 17.7 g/dL Final     WBC   Date Value Ref Range Status   04/29/2021 8.5 4.0 - 11.0 10e9/L Final   07/28/2020 7.4 4.0 - 11.0 10e9/L Final   07/27/2020 10.4 4.0 - 11.0 10e9/L Final     WBC Count   Date Value Ref Range Status   11/20/2024 11.8 (H) 4.0 - 11.0 10e3/uL Final   11/19/2024 11.6 (H) 4.0 - 11.0 10e3/uL Final   11/19/2024 11.7 (H) 4.0 - 11.0 10e3/uL Final     Platelet Count   Date Value Ref Range Status   11/20/2024 239 150 - 450 10e3/uL Final   11/19/2024 248 150 - 450 10e3/uL Final   11/19/2024 273 150 - 450 10e3/uL Final   04/29/2021 250 150 - 450 10e9/L Final   07/28/2020 219 150 - 450 10e9/L Final   07/27/2020 247 150 - 450 10e9/L Final     Creatinine   Date Value Ref Range Status   11/20/2024 1.49 (H) 0.67 - 1.17 mg/dL Final   11/19/2024 1.42 (H) 0.67 - 1.17 mg/dL Final   11/19/2024 1.42 (H) 0.67 - 1.17 mg/dL Final   04/29/2021 1.19 0.66 - 1.25 mg/dL Final   07/28/2020 1.19 0.66 - 1.25 mg/dL Final   07/27/2020 1.30 (H) 0.66 - 1.25 mg/dL Final     Potassium   Date Value Ref Range Status   11/20/2024 3.8 3.4 - 5.3 mmol/L  Final   11/20/2024 3.8 3.4 - 5.3 mmol/L Final   11/19/2024 3.9 3.4 - 5.3 mmol/L Final   08/27/2022 4.6 3.5 - 5.0 mmol/L Final   08/26/2022 4.8 3.5 - 5.0 mmol/L Final   08/25/2022 5.2 (H) 3.5 - 5.0 mmol/L Final   04/29/2021 4.7 3.4 - 5.3 mmol/L Final   07/28/2020 4.5 3.4 - 5.3 mmol/L Final   07/27/2020 3.9 3.4 - 5.3 mmol/L Final     Potassium POCT   Date Value Ref Range Status   11/18/2024 5.1 3.4 - 5.3 mmol/L Final   11/18/2024 5.3 3.4 - 5.3 mmol/L Final   11/18/2024 5.2 3.4 - 5.3 mmol/L Final     Magnesium   Date Value Ref Range Status   11/20/2024 2.2 1.7 - 2.3 mg/dL Final   11/19/2024 2.6 (H) 1.7 - 2.3 mg/dL Final   11/18/2024 2.8 (H) 1.7 - 2.3 mg/dL Final   01/24/2020 1.9 1.6 - 2.3 mg/dL Final   10/20/2018 2.0 1.6 - 2.3 mg/dL Final   05/01/2018 1.8 1.6 - 2.3 mg/dL Final          I/O:  I/O last 3 completed shifts:  In: 2846.19 [I.V.:2221.19]  Out: 4100 [Urine:3940; Chest Tube:160]       Physical Exam:    General: Patient seen in bed intubated/sedated  HEENT: DONALDO, no sclera icterus, moist mucosa, ETT in place, no tracheal deviation  CV: RRR on monitor. 2+ peripheral pulses in all extremities. Mild edema.   Pulm: Non-labored effort on CMV 30% FiO2. Chest tubes in place, no air leak. Incision C/D/I.  Abd: Soft, NT, ND  : Barnes with urban urine  Ext: Mild pedal edema, SCDs in place, warm, distal pulses intact. IABP site C/D/I  Neuro: Sedated, Unable to fully eval, Nods/shakes head appropriately, and Follows commands      ASSESSMENT/PLAN:    Lance Ibrahim is a 79 year old male with a history of CAD w/ known LM stenosis, CKD1, DM2, chronic anemia nonischemic cardiomyopathy (thought to be d/t HTN) w/ chronic HFpEF, h/o CVA (bilateral PCA emboli), and spinal stenosis w/ spinal cord stimulator in place who is s/p CABG x3 .    Principal Problem:    NSTEMI (non-ST elevated myocardial infarction) (H)  Active Problems:    ST elevation MI (STEMI) (H)        NEURO:   - Scheduled Tylenol/lidocaine patches and PRN  Tylenol/oxycodone/dilaudid for pain    CV:   - Pre-op EF 25-30%  - IABP removed POD#1  - Normotensive off pressors as of recently  - Metoprolol tartrate 12.5 mg BID to start later today  -  mg  -  Atorvastatin 40mg daily  - Chest tubes/TPW to remain today  - New baseline echo after CT/TPW removed and prior to discharge. - May need LifeVest  - Consider HF consult    PULM:   - Extubated POD#2    - Maintaining oxygen saturations on nasal cannula - wean as able  - Encourage pulmonary toilet    FEN/GI:  - Diet: Clears, ADAT to Cardiac  - Continue electrolyte replacement protocol  - Bowel regimen, LBM preop    RENAL:  - adequate UOP/hr. Continue to monitor closely.  - Cr 1.42 (baseline ~ 1.3-1.4)  - Barnes to remain in for close monitoring of I/O and during period of diuresis/relative immobility - plan for removal POD2  - Diuresis PRN today - responded well to lasix this AM both in terms of UOP and hemodynamics    HEME:  - Acute blood loss anemia post-op.   - Hgb 11.1, no active bleeding concerns. Hep SQ, ASA  - INR 1.63 this AM, plan for 1u FFP  - POD#0 got 3u PRBCs and half dose factor VII    ID:  - Armida op ppx complete, afebrile. No concerns for infection    ENDO:   - HbA1c 9.1%  - BG goal < 180 to promote optimal healing  - PTA on glipizide 2.5 mg qday, lantus 10u BID - holding PO DM2 med until POD#3 pending renal function  - Transition to sliding scale insulin    PPx:   - DVT: SCDs, SQ heparin TID, ambulation   - GI: Protonix 40mg PO daily    DISPO:   - Continue critical care in ICU - consider transfer [facility] later this AM pending hemodynamic stability off pressors and respiratory status s/p extubation  - PT/OT recs at discharge: pending  - Medically Ready for Discharge: Anticipated in 2-4 Days        Patient discussed with Dr. Evans.        Ernestina Barr PA-C  [facility] Cardiothoracic Surgery  Vocera or Secure Chat  November 20, 2024

## 2024-11-20 NOTE — PROGRESS NOTES
Patient weaned for 21 minutes 5/5. SBT stopped due to increased RR. RT will follow.    Asher De Los Santos, RT  11/20/24

## 2024-11-20 NOTE — PROGRESS NOTES
Critical Care Progress Note      11/20/2024    Name: Lance Ibrahim MRN#: [id number]   Age: 79 year old YOB: 1945     Hsptl Day# 6  ICU DAY #    MV DAY #             Problem List:   Principal Problem:    NSTEMI (non-ST elevated myocardial infarction) (H)  Active Problems:    ST elevation MI (STEMI) (H)    Clinically Significant Risk Factors          # Hyperchloremia: Highest Cl = 109 mmol/L in last 2 days, will monitor as appropriate      # Hypocalcemia: Lowest iCa = 4.3 mg/dL in last 2 days, will monitor and replace as appropriate     # Hypoalbuminemia: Lowest albumin = 2.6 g/dL at 11/16/2024  4:06 AM, will monitor as appropriate    # Coagulation Defect: INR = 1.62 (Ref range: 0.85 - 1.15) and/or PTT = 47 Seconds (Ref range: 22 - 38 Seconds), will monitor for bleeding    # Hypertension: Noted on problem list  # Acute heart failure with reduced ejection fraction: last echo with EF <40% and receiving IV diuretics    # Acute Hypercapnic Respiratory Failure: based on venous blood gas results.  Continue supplemental oxygen and ventilatory support as indicated.        # DMII: A1C = 9.1 % (Ref range: <5.7 %) within past 6 months   # Overweight: Estimated body mass index is 27.93 kg/m  as calculated from the following:    Height as of this encounter: 1.829 m (6').    Weight as of this encounter: 93.4 kg (205 lb 14.6 oz).        # Financial/Environmental Concerns:     # History of CABG: noted on surgical history                         Summary/Hospital Course:     78 y/o male with PMHx of DM, CVA without residual symptoms, Gout, BPH,  HTN, CAD with multivessel disease s/p CABG x 2 on 11/18 ([place]).   Overnight he remained on IABP was removed this morning.        Assessment and plan :       I have personally reviewed the daily labs, imaging studies, cultures and discussed the case with referring physician and consulting physicians.     My assessment and plan by system for this patient is as  follows:    Neurology/Psychiatry:   Off sedation this morning for SBT.  Wakes up follows commands  Doing fine postextubation    Cardiovascular:   Multivessel coronary disease status post CABG x 2  IABP removed on 11/19  Remains on nicardipine.  Off epinephrine      Pulmonary/Ventilator Management:   Acute respiratory failure resolved  Passed SBT and extubated this morning.  Somewhat tachypneic during the SBT but most of this was due to anxiety.  Postextubation he is doing fine      GI and Nutrition :   N.p.o. postextubation.  Diet per primary team      Renal/Fluids/Electrolytes:   Good urine output with Lasix.  Almost net even    - monitor function and electrolytes as needed with replacement per ICU protocols.  - generally avoid nephrotoxic agents such as NSAID, IV contrast unless specifically required  - adjust medications as needed for renal clearance  - follow I/O's as appropriate.    Endocrine:   History of diabetes mellitus on glargine  Insulin drip to maintain glucose control  Plan  - ICU insulin protocol, goal sugar <180      ICU Prophylaxis:   1. DVT: Hep Subq  2. VAP: HOB 30 degrees, chlorhexidine rinse  3. Stress Ulcer: PPI  4. Restraints: Nonviolent soft two point restraints required and necessary for patient safety and continued cares and good effect as patient continues to pull at necessary lines, tubes despite education and distraction. Will readdress daily.            Key Medications:     Current Facility-Administered Medications   Medication Dose Route Frequency Provider Last Rate Last Admin    acetaminophen (TYLENOL) tablet 975 mg  975 mg Oral Q8H Shania Barr PA-C   975 mg at 11/20/24 1120    Or    acetaminophen (TYLENOL) Suppository 650 mg  650 mg Rectal Q8H Shania Barr PA-C   650 mg at 11/20/24 0320    allopurinol (ZYLOPRIM) tablet 100 mg  100 mg Oral Daily Shania Barr PA-C   100 mg at 11/20/24 1122    aspirin (ASA) chewable tablet 162 mg  162 mg Oral or NG Tube Daily  Shania Barr PA-C   162 mg at 11/20/24 1120    Or    aspirin (ASA) Suppository 300 mg  300 mg Rectal Daily Shania Barr PA-C   300 mg at 11/19/24 0917    atorvastatin (LIPITOR) tablet 40 mg  40 mg Oral QPM Shania Barr PA-C   40 mg at 11/17/24 2033    chlorhexidine (PERIDEX) 0.12 % solution 15 mL  15 mL [facility] Q12[facility] Ryan Hallman MD   15 mL at 11/20/24 0747    ferrous gluconate (FERGON) tablet 324 mg  324 mg Oral Daily Shania Barr PA-C   324 mg at 11/20/24 1123    finasteride (PROSCAR) tablet 5 mg  5 mg Oral Daily Shania Barr PA-C   5 mg at 11/20/24 1123    furosemide (LASIX) injection 40 mg  40 mg Intravenous TID Shania Barr PA-C   40 mg at 11/20/24 0747    [Held by provider] glipiZIDE (GLUCOTROL XL) 24 hr tablet 2.5 mg  2.5 mg Oral Daily Shania Barr PA-C        heparin ANTICOAGULANT injection 5,000 Units  5,000 Units Subcutaneous Q8H Shania Barr PA-C   5,000 Units at 11/20/24 1124    insulin aspart (NovoLOG) injection (RAPID ACTING)  1-10 Units Subcutaneous TID  Shania Barr PA-C        insulin aspart (NovoLOG) injection (RAPID ACTING)  1-7 Units Subcutaneous At Bedtime Shania Barr PA-C        Lidocaine (LIDOCARE) 4 % Patch 1-2 patch  1-2 patch Transdermal Q24H Shania Barr PA-C   1 patch at 11/19/24 1639    metoprolol tartrate (LOPRESSOR) half-tab 12.5 mg  12.5 mg Oral BID Shania Barr PA-C   12.5 mg at 11/20/24 1122    pantoprazole (PROTONIX) 2 mg/mL suspension 40 mg  40 mg Oral or NG Tube QAM AC Shania Barr PA-C        Or    pantoprazole (PROTONIX) EC tablet 40 mg  40 mg Oral QAM AC Shania Barr PA-C   40 mg at 11/20/24 1122    polyethylene glycol (MIRALAX) Packet 17 g  17 g Oral Daily Shania Barr PA-C   17 g at 11/20/24 1123    potassium chloride 20 mEq in 50 mL intermittent infusion  20 mEq Intravenous Once Tsering Evans MD         senna-docusate (SENOKOT-S/PERICOLACE) 8.6-50 MG per tablet 1 tablet  1 tablet Oral BID Shania Barr PA-C   1 tablet at 11/20/24 1122    sodium bicarbonate tablet 1,950 mg  1,950 mg Oral TID Shania Barr PA-C        ursodiol (ACTIGALL) capsule 300 mg  300 mg Oral BID Shania Barr PA-C   300 mg at 11/20/24 1123     Current Facility-Administered Medications   Medication Dose Route Frequency Provider Last Rate Last Admin    EPINEPHrine (ADRENALIN) 5 mg in sodium chloride 0.9 % 250 mL infusion CENTRAL  0.01-0.1 mcg/kg/min Intravenous Continuous PRN Shania Barr PA-C   Stopped at 11/20/24 0823    Med Instruction - Transition from IV Insulin Infusion to Sub-Q Insulin   Does not apply Continuous PRN Shania Barr PA-C        niCARdipine 40 mg in 200 mL NS (CARDENE) infusion  0.5-15 mg/hr Intravenous Continuous PRN Shania Barr PA-C 37.5 mL/hr at 11/20/24 0905 7.5 mg/hr at 11/20/24 0905    Reason beta blocker order not selected   Does not apply DOES NOT GO TO Shania Rueda PA-C                   Physical Examination:   Temp:  [98.4  F (36.9  C)-100.2  F (37.9  C)] 98.4  F (36.9  C)  Pulse:  [74-99] 99  Resp:  [14-42] 14  BP: (109-136)/(42-53) 136/53  MAP:  [64 mmHg-123 mmHg] 78 mmHg  Arterial Line BP: (109-139)/() 137/51  FiO2 (%):  [30 %] 30 %  SpO2:  [95 %-100 %] 100 %    Intake/Output Summary (Last 24 hours) at 11/19/2024 1343  Last data filed at 11/19/2024 1300  Gross per 24 hour   Intake 5918.17 ml   Output 3825 ml   Net 2093.17 ml     Wt Readings from Last 4 Encounters:   11/19/24 93.4 kg (205 lb 14.6 oz)   11/14/24 87.1 kg (192 lb)   09/10/24 88 kg (194 lb)   09/03/24 88 kg (194 lb)     Arterial Line BP: (109-139)/() 137/51  MAP:  [64 mmHg-123 mmHg] 78 mmHg  CVP:  [9 mmHg-20 mmHg] 12 mmHg  SVO2:  [63 %-75 %] 71 %  FiO2 (%): 30 %, Resp: 14, Vent Mode: (S) -- (pt extubated), Resp Rate (Set): (S) 14 breaths/min (change made by Dr. Matos), Tidal  Volume (Set, mL): 550 mL, PEEP (cm H2O): 5 cmH2O, Pressure Support (cm H2O): 5 cmH2O, Resp Rate (Set): (S) 14 breaths/min (change made by Dr. Matos), Tidal Volume (Set, mL): 550 mL, PEEP (cm H2O): 5 cmH2O  Recent Labs   Lab 11/20/24  0515 11/20/24  0354 11/19/24  2157 11/19/24  0532 11/19/24  0518 11/18/24  2212   PH 7.49*  --  7.45  --  7.42 7.42   PCO2 25*  --  32*  --  33* 30*   PO2 89  --  80  --  92 119*   HCO3 19*  --  22  --  21 20*   O2PER 30 30 30 30 30 40       GEN: no acute distress   HEENT: head ncat, sclera anicteric, OP patent, trachea midline   PULM: Lungs clear.  Just extubated  CV/COR: RRR S1S2 no gallop,  No rub, no murmur  ABD: soft nontender, hypoactive bowel sounds, no mass  EXT: No significant edema  NEURO: Awake following commands.  Just extubated  SKIN: no obvious rash  LINES: clean, dry intact         Data:   All data and imaging reviewed     ROUTINE ICU LABS (Last four results)  CMP  Recent Labs   Lab 11/20/24  1050 11/20/24  0802 11/20/24  0701 11/20/24  0603 11/20/24  0351 11/20/24  0332 11/19/24  0617 11/19/24  0511 11/19/24  0008 11/19/24  0006 11/18/24  1705 11/18/24  1630 11/18/24  0805 11/18/24  0602 11/18/24  0110 11/18/24  0022 11/15/24  0804 11/15/24  0258   NA  --   --   --   --   --  143  --  141  --  139  --  138   < > 137  --  134*   < > 142   POTASSIUM  --   --   --   --   --  3.8  3.8  --  3.9  --  4.2  --  4.7   < > 4.3  --  4.4   < > 3.1*   CHLORIDE  --   --   --   --   --  109*  --  108*  --  105  --  104  --  101  --  100   < > 101   CO2  --   --   --   --   --  18*  --  20*  --  19*  --  20*  --  25  --  24   < > 28   ANIONGAP  --   --   --   --   --  16*  --  13  --  15  --  14  --  11  --  10   < > 13   * 120* 119* 124*   < > 121*   < > 122*   < > 124*   < > 181*   < > 124*   < > 93   < > 140*   BUN  --   --   --   --   --  23.2*  --  22.9  --  21.9  --  21.1  --  23.4*  --  23.3*   < > 20.9   CR  --   --   --   --   --  1.49*  --  1.42*  --  1.42*  --  1.33*   "--  1.40*  --  1.41*   < > 1.38*   GFRESTIMATED  --   --   --   --   --  47*  --  50*  --  50*  --  54*  --  51*  --  50*   < > 52*   SHANNAN  --   --   --   --   --  8.3*  --  8.5*  --  8.4*  --  8.4*  --  8.5*  --  8.3*   < > 8.4*   MAG  --   --   --   --   --  2.2  --  2.6*  --   --   --  2.8*  --  2.5*  --   --    < > 1.9   PHOS  --   --   --   --   --  4.5  --  4.4  --   --   --  4.3  --   --   --   --   --  4.1   PROTTOTAL  --   --   --   --   --   --   --   --   --  6.1*  --  6.1*  --  6.7  --  6.5   < >  --    ALBUMIN  --   --   --   --   --   --   --   --   --  3.4*  --  3.1*  --  2.7*  --  2.7*   < >  --    BILITOTAL  --   --   --   --   --   --   --   --   --  1.1  --  1.2  --  1.0  --  1.0   < >  --    ALKPHOS  --   --   --   --   --   --   --   --   --  214*  --  254*  --  333*  --  314*   < >  --    AST  --   --   --   --   --   --   --   --   --  50*  --  54*  --  45  --  43   < >  --    ALT  --   --   --   --   --   --   --   --   --  23  --  24  --  26  --  25   < >  --     < > = values in this interval not displayed.     CBC  Recent Labs   Lab 11/20/24  0332 11/19/24  1416 11/19/24  0511 11/18/24  1630   WBC 11.8* 11.6* 11.7* 17.6*   RBC 3.67* 3.74* 3.93* 3.71*   HGB 10.5*  10.4* 10.6* 11.1* 10.8*   HCT 33.6* 33.5* 35.2* 33.4*   MCV 92 90 90 90   MCH 28.6 28.3 28.2 29.1   MCHC 31.3* 31.6 31.5 32.3   RDW 16.3* 16.3* 15.9* 15.9*    248 273 262     INR  Recent Labs   Lab 11/19/24  0801 11/18/24  1630 11/18/24  1405 11/15/24  0258   INR 1.62* 1.00 2.04* 1.73*     Arterial Blood Gas  Recent Labs   Lab 11/20/24  0515 11/20/24  0354 11/19/24  2157 11/19/24  0532 11/19/24  0518 11/18/24  2212   PH 7.49*  --  7.45  --  7.42 7.42   PCO2 25*  --  32*  --  33* 30*   PO2 89  --  80  --  92 119*   HCO3 19*  --  22  --  21 20*   O2PER 30 30 30 30 30 40       All cultures:  No results for input(s): \"CULT\" in the last 168 hours.  Recent Results (from the past 24 hours)   XR Chest Port 1 View    Narrative    " EXAM: XR CHEST PORTABLE 1 VIEW  LOCATION: [facility]  DATE: 11/20/2024    INDICATION: Confirm IABP  placement.  COMPARISON: 11/19/2024.      Impression    IMPRESSION: Sternotomy with mediastinal clips and markers. Endotracheal tube tip in satisfactory position. Ozan-Aiden catheter with tip near the pulmonary outflow tract. No intra-aortic balloon pump marker is identified. Left chest tube, stable. Right   chest tube, stable. No pneumothorax. Mediastinal drain. Spinal stimulator leads projecting over the spine in stable position. Heart size upper limits of normal. Mild pulmonary vascular prominence or congestion. Moderate to marked degenerative changes in   the left shoulder.         30 minutes spent with the patient

## 2024-11-20 NOTE — PROGRESS NOTES
Patient extubated at 820. Oral, ETT, and subglottic suctioning prior to extubation. No complications. Pt awake and alert following commands. BS equal, no stridor present. Emergency equipment at bedside.

## 2024-11-20 NOTE — PLAN OF CARE
[facility] - ICU    RN Progress Note:    Over NOC, ventilator wean at 5/5 from 20:09 - 21:48. Wean terminated s/t tachypnea, and Pt request for a break. Post wean ABG showing compensated respiratory alkalosis (pH 7.45, CO2 32, BiCarb 22) with P/F Ratio 267. Full vent support, remainder of NOC. SaO2 maintained within ordered parameters with 30 % FiO2 and 5 cmH2O of PEEP. Mechanical ventilation well tolerated with titration of sedation (fentanyl, Precedex) to RASS Goal 0/-1. [place] remains in place. Hemodynamic stability well maintained within ordered parameters with epinephrine, and nicardipine. Cardiac rhythm remains sinus. Epicardial pacemaker wires capped. Surgical incision are clean, dry, and well approximated with liquid bandage. Post-surgical pain managed with ordered analgesics. Chest Tubes remain in place (mediastinal x 2) and (pleural x 2), are without air leak, and are set to wall suction as ordered. Over NOC, mediastinal CT's produced 30 mL serosanguinous output. Pleural CT's produced 70 mL serosanguinous out put. Surgical site, and CT care provided, per Unit routine. Pt remains NPO. Blood glucose well managed with insulin drip. Bowel sounds hypoactive. 0 BM/0 Flatus. Subcutaneous anticoagulation administered, as scheduled. Bellow the knee sequential compression devices in place, bilaterally. Will continue to monitor. Angel Flores RN          Pertinent Assessments:      Please refer to flowsheet rows for full assessment     Vital signs.            Key Events - This Shift:       Vent wean for 100 min terminated s/t tachypnea.                 Barriers to Discharge / Downgrade:     Mechanical ventilation.   Titratable vasoactive medications

## 2024-11-21 ENCOUNTER — APPOINTMENT (OUTPATIENT)
Dept: PHYSICAL THERAPY | Facility: HOSPITAL | Age: 79
End: 2024-11-21
Payer: COMMERCIAL

## 2024-11-21 ENCOUNTER — APPOINTMENT (OUTPATIENT)
Dept: OCCUPATIONAL THERAPY | Facility: HOSPITAL | Age: 79
End: 2024-11-21
Payer: COMMERCIAL

## 2024-11-21 VITALS
WEIGHT: 201.9 LBS | OXYGEN SATURATION: 91 % | DIASTOLIC BLOOD PRESSURE: 54 MMHG | RESPIRATION RATE: 19 BRPM | HEART RATE: 86 BPM | SYSTOLIC BLOOD PRESSURE: 110 MMHG | HEIGHT: 72 IN | TEMPERATURE: 98 F | BODY MASS INDEX: 27.35 KG/M2

## 2024-11-21 LAB
ANION GAP SERPL CALCULATED.3IONS-SCNC: 12 MMOL/L (ref 7–15)
BUN SERPL-MCNC: 29.7 MG/DL (ref 8–23)
CA-I BLD-MCNC: 4.5 MG/DL (ref 4.4–5.2)
CALCIUM SERPL-MCNC: 8.7 MG/DL (ref 8.8–10.4)
CHLORIDE SERPL-SCNC: 106 MMOL/L (ref 98–107)
CREAT SERPL-MCNC: 1.57 MG/DL (ref 0.67–1.17)
EGFRCR SERPLBLD CKD-EPI 2021: 44 ML/MIN/1.73M2
ERYTHROCYTE [DISTWIDTH] IN BLOOD BY AUTOMATED COUNT: 16.3 % (ref 10–15)
GLUCOSE BLDC GLUCOMTR-MCNC: 161 MG/DL (ref 70–99)
GLUCOSE BLDC GLUCOMTR-MCNC: 185 MG/DL (ref 70–99)
GLUCOSE BLDC GLUCOMTR-MCNC: 193 MG/DL (ref 70–99)
GLUCOSE BLDC GLUCOMTR-MCNC: 197 MG/DL (ref 70–99)
GLUCOSE SERPL-MCNC: 165 MG/DL (ref 70–99)
HCO3 SERPL-SCNC: 23 MMOL/L (ref 22–29)
HCT VFR BLD AUTO: 37.6 % (ref 40–53)
HGB BLD-MCNC: 11.6 G/DL (ref 13.3–17.7)
MAGNESIUM SERPL-MCNC: 2 MG/DL (ref 1.7–2.3)
MCH RBC QN AUTO: 28.6 PG (ref 26.5–33)
MCHC RBC AUTO-ENTMCNC: 30.9 G/DL (ref 31.5–36.5)
MCV RBC AUTO: 93 FL (ref 78–100)
PHOSPHATE SERPL-MCNC: 4.7 MG/DL (ref 2.5–4.5)
PLATELET # BLD AUTO: 284 10E3/UL (ref 150–450)
POTASSIUM SERPL-SCNC: 4 MMOL/L (ref 3.4–5.3)
RBC # BLD AUTO: 4.05 10E6/UL (ref 4.4–5.9)
SODIUM SERPL-SCNC: 141 MMOL/L (ref 135–145)
WBC # BLD AUTO: 13.4 10E3/UL (ref 4–11)

## 2024-11-21 PROCEDURE — 85041 AUTOMATED RBC COUNT: CPT

## 2024-11-21 PROCEDURE — 84100 ASSAY OF PHOSPHORUS: CPT | Performed by: THORACIC SURGERY (CARDIOTHORACIC VASCULAR SURGERY)

## 2024-11-21 PROCEDURE — 83735 ASSAY OF MAGNESIUM: CPT | Performed by: THORACIC SURGERY (CARDIOTHORACIC VASCULAR SURGERY)

## 2024-11-21 PROCEDURE — 82330 ASSAY OF CALCIUM: CPT

## 2024-11-21 PROCEDURE — 36415 COLL VENOUS BLD VENIPUNCTURE: CPT

## 2024-11-21 PROCEDURE — 97535 SELF CARE MNGMENT TRAINING: CPT | Mod: GO

## 2024-11-21 PROCEDURE — 97166 OT EVAL MOD COMPLEX 45 MIN: CPT | Mod: GO

## 2024-11-21 PROCEDURE — 97162 PT EVAL MOD COMPLEX 30 MIN: CPT | Mod: GP

## 2024-11-21 PROCEDURE — 250N000011 HC RX IP 250 OP 636: Performed by: PHYSICIAN ASSISTANT

## 2024-11-21 PROCEDURE — 97530 THERAPEUTIC ACTIVITIES: CPT | Mod: GP

## 2024-11-21 PROCEDURE — 999N000157 HC STATISTIC RCP TIME EA 10 MIN

## 2024-11-21 PROCEDURE — 250N000011 HC RX IP 250 OP 636

## 2024-11-21 PROCEDURE — 85018 HEMOGLOBIN: CPT

## 2024-11-21 PROCEDURE — 200N000001 HC R&B ICU

## 2024-11-21 PROCEDURE — 80048 BASIC METABOLIC PNL TOTAL CA: CPT

## 2024-11-21 PROCEDURE — 94799 UNLISTED PULMONARY SVC/PX: CPT

## 2024-11-21 PROCEDURE — 250N000013 HC RX MED GY IP 250 OP 250 PS 637: Performed by: INTERNAL MEDICINE

## 2024-11-21 PROCEDURE — 250N000013 HC RX MED GY IP 250 OP 250 PS 637

## 2024-11-21 PROCEDURE — 250N000012 HC RX MED GY IP 250 OP 636 PS 637: Performed by: PHYSICIAN ASSISTANT

## 2024-11-21 PROCEDURE — 250N000013 HC RX MED GY IP 250 OP 250 PS 637: Performed by: PHYSICIAN ASSISTANT

## 2024-11-21 PROCEDURE — 250N000013 HC RX MED GY IP 250 OP 250 PS 637: Performed by: THORACIC SURGERY (CARDIOTHORACIC VASCULAR SURGERY)

## 2024-11-21 RX ORDER — METOPROLOL TARTRATE 25 MG/1
25 TABLET, FILM COATED ORAL 2 TIMES DAILY
Status: DISCONTINUED | OUTPATIENT
Start: 2024-11-21 | End: 2024-11-23

## 2024-11-21 RX ORDER — FUROSEMIDE 10 MG/ML
40 INJECTION INTRAMUSCULAR; INTRAVENOUS
Status: DISCONTINUED | OUTPATIENT
Start: 2024-11-21 | End: 2024-11-22

## 2024-11-21 RX ORDER — MAGNESIUM OXIDE 400 MG/1
400 TABLET ORAL EVERY 4 HOURS
Status: COMPLETED | OUTPATIENT
Start: 2024-11-21 | End: 2024-11-21

## 2024-11-21 RX ADMIN — SODIUM BICARBONATE 650 MG TABLET 1950 MG: at 13:53

## 2024-11-21 RX ADMIN — FERROUS GLUCONATE 324 MG: 324 TABLET ORAL at 08:39

## 2024-11-21 RX ADMIN — ACETAMINOPHEN 975 MG: 325 TABLET ORAL at 01:39

## 2024-11-21 RX ADMIN — FINASTERIDE 5 MG: 5 TABLET, FILM COATED ORAL at 08:40

## 2024-11-21 RX ADMIN — MAGNESIUM OXIDE TAB 400 MG (241.3 MG ELEMENTAL MG) 400 MG: 400 (241.3 MG) TAB at 12:20

## 2024-11-21 RX ADMIN — PANTOPRAZOLE SODIUM 40 MG: 40 TABLET, DELAYED RELEASE ORAL at 07:46

## 2024-11-21 RX ADMIN — CHLORHEXIDINE GLUCONATE 15 ML: 1.2 SOLUTION ORAL at 07:51

## 2024-11-21 RX ADMIN — MAGNESIUM OXIDE TAB 400 MG (241.3 MG ELEMENTAL MG) 400 MG: 400 (241.3 MG) TAB at 08:39

## 2024-11-21 RX ADMIN — METOPROLOL TARTRATE 12.5 MG: 25 TABLET, FILM COATED ORAL at 03:43

## 2024-11-21 RX ADMIN — HEPARIN SODIUM 5000 UNITS: 5000 INJECTION, SOLUTION INTRAVENOUS; SUBCUTANEOUS at 20:11

## 2024-11-21 RX ADMIN — ASPIRIN 81 MG CHEWABLE TABLET 162 MG: 81 TABLET CHEWABLE at 08:39

## 2024-11-21 RX ADMIN — HYDRALAZINE HYDROCHLORIDE 10 MG: 20 INJECTION INTRAMUSCULAR; INTRAVENOUS at 00:21

## 2024-11-21 RX ADMIN — METOPROLOL TARTRATE 25 MG: 25 TABLET, FILM COATED ORAL at 20:12

## 2024-11-21 RX ADMIN — METOPROLOL TARTRATE 12.5 MG: 25 TABLET, FILM COATED ORAL at 12:20

## 2024-11-21 RX ADMIN — METOPROLOL TARTRATE 25 MG: 25 TABLET, FILM COATED ORAL at 08:39

## 2024-11-21 RX ADMIN — CHLORHEXIDINE GLUCONATE 15 ML: 1.2 SOLUTION ORAL at 20:17

## 2024-11-21 RX ADMIN — HYDRALAZINE HYDROCHLORIDE 10 MG: 20 INJECTION INTRAMUSCULAR; INTRAVENOUS at 07:43

## 2024-11-21 RX ADMIN — METOPROLOL TARTRATE 12.5 MG: 25 TABLET, FILM COATED ORAL at 16:29

## 2024-11-21 RX ADMIN — URSODIOL 300 MG: 300 CAPSULE ORAL at 08:41

## 2024-11-21 RX ADMIN — POLYETHYLENE GLYCOL 3350 17 G: 17 POWDER, FOR SOLUTION ORAL at 08:39

## 2024-11-21 RX ADMIN — FUROSEMIDE 40 MG: 10 INJECTION, SOLUTION INTRAMUSCULAR; INTRAVENOUS at 17:35

## 2024-11-21 RX ADMIN — HEPARIN SODIUM 5000 UNITS: 5000 INJECTION, SOLUTION INTRAVENOUS; SUBCUTANEOUS at 11:40

## 2024-11-21 RX ADMIN — INSULIN ASPART 1 UNITS: 100 INJECTION, SOLUTION INTRAVENOUS; SUBCUTANEOUS at 08:42

## 2024-11-21 RX ADMIN — OXYCODONE HYDROCHLORIDE 5 MG: 5 TABLET ORAL at 11:40

## 2024-11-21 RX ADMIN — OXYCODONE HYDROCHLORIDE 5 MG: 5 TABLET ORAL at 05:35

## 2024-11-21 RX ADMIN — URSODIOL 300 MG: 300 CAPSULE ORAL at 20:12

## 2024-11-21 RX ADMIN — SODIUM BICARBONATE 650 MG TABLET 1950 MG: at 08:40

## 2024-11-21 RX ADMIN — INSULIN ASPART 2 UNITS: 100 INJECTION, SOLUTION INTRAVENOUS; SUBCUTANEOUS at 16:38

## 2024-11-21 RX ADMIN — HEPARIN SODIUM 5000 UNITS: 5000 INJECTION, SOLUTION INTRAVENOUS; SUBCUTANEOUS at 03:43

## 2024-11-21 RX ADMIN — SENNOSIDES AND DOCUSATE SODIUM 1 TABLET: 8.6; 5 TABLET ORAL at 08:39

## 2024-11-21 RX ADMIN — GABAPENTIN 300 MG: 300 CAPSULE ORAL at 21:55

## 2024-11-21 RX ADMIN — INSULIN GLARGINE 10 UNITS: 100 INJECTION, SOLUTION SUBCUTANEOUS at 20:09

## 2024-11-21 RX ADMIN — Medication 5 MG: at 21:55

## 2024-11-21 RX ADMIN — HYDROMORPHONE HYDROCHLORIDE 0.4 MG: 0.2 INJECTION, SOLUTION INTRAMUSCULAR; INTRAVENOUS; SUBCUTANEOUS at 00:40

## 2024-11-21 RX ADMIN — ATORVASTATIN CALCIUM 40 MG: 40 TABLET, FILM COATED ORAL at 20:11

## 2024-11-21 RX ADMIN — OXYCODONE HYDROCHLORIDE 5 MG: 5 TABLET ORAL at 16:29

## 2024-11-21 RX ADMIN — LIDOCAINE 1 PATCH: 4 PATCH TOPICAL at 16:31

## 2024-11-21 RX ADMIN — FUROSEMIDE 40 MG: 10 INJECTION, SOLUTION INTRAMUSCULAR; INTRAVENOUS at 06:28

## 2024-11-21 RX ADMIN — INSULIN ASPART 3 UNITS: 100 INJECTION, SOLUTION INTRAVENOUS; SUBCUTANEOUS at 12:21

## 2024-11-21 RX ADMIN — ACETAMINOPHEN 650 MG: 325 TABLET ORAL at 11:40

## 2024-11-21 RX ADMIN — ALLOPURINOL 100 MG: 100 TABLET ORAL at 08:40

## 2024-11-21 RX ADMIN — ACETAMINOPHEN 650 MG: 325 TABLET ORAL at 16:29

## 2024-11-21 RX ADMIN — SODIUM BICARBONATE 650 MG TABLET 1950 MG: at 20:12

## 2024-11-21 NOTE — PLAN OF CARE
Problem: Cardiovascular Surgery  Goal: Effective Cardiac Function  Outcome: Progressing     Overall Patient Progress: improvingOverall Patient Progress: improving    Outcome Evaluation: Off vasopressors, central line and a-line removed    [facility] - ICU    RN Progress Note:            Pertinent Assessments:      Please refer to flowsheet rows for full assessment   Patient  Alert, oriented but some forgetfulness. Intermittent moaning- patient will deny discomfort but continues to moan and says he is not aware.  Pain managed with scheduled and PRN  Meds.        Key Events - This Shift:     Lines removed. Monitor shows SR occasional PVC's. Blood pressure WNL.  On room air.  Hoyered to and from bed to chair.             Barriers to Discharge / Downgrade:   Likely to transfer to tele status today.

## 2024-11-21 NOTE — PROGRESS NOTES
CVTS Daily Progress Note   POD# 3  s/p CABG x3 (LIMA>LAD, rSVG>RI, rSVG>distal OM) , LLE EVH w/ IABP in place  Attending: Nathan  LOS: 7    SUBJECTIVE/INTERVAL EVENTS:    No acute events overnight.. Patient progressing well. Maintaining oxygen saturations on RA. Normotensive. Moved with lift to chair. Pain well controlled.  + BM / + flatus. Tolerating diet without nausea. UOP  brisk w/ lasix administration . Chest tube output appropriate. Hgb 11.6.Answered all pts questions today. Kash CP, SOB, abd pain or calf pain.    OBJECTIVE:  Temp:  [97.5  F (36.4  C)-99.1  F (37.3  C)] 97.9  F (36.6  C)  Pulse:  [80-99] 87  Resp:  [10-33] 31  BP: ()/(49-72) 99/54  MAP:  [69 mmHg-91 mmHg] 70 mmHg  Arterial Line BP: ()/(47-62) 119/47  SpO2:  [91 %-100 %] 92 %  Vitals:    11/16/24 0441 11/17/24 0200 11/18/24 0500 11/19/24 0200   Weight: 90.2 kg (198 lb 12.8 oz) 91.4 kg (201 lb 8 oz) 92.4 kg (203 lb 11.2 oz) 93.4 kg (205 lb 14.6 oz)    11/21/24 0040   Weight: 91.6 kg (201 lb 14.4 oz)       Clinically Significant Risk Factors          # Hyperchloremia: Highest Cl = 109 mmol/L in last 2 days, will monitor as appropriate          # Hypoalbuminemia: Lowest albumin = 2.6 g/dL at 11/16/2024  4:06 AM, will monitor as appropriate  # Coagulation Defect: INR = 1.62 (Ref range: 0.85 - 1.15) and/or PTT = 47 Seconds (Ref range: 22 - 38 Seconds), will monitor for bleeding    # Hypertension: Noted on problem list    # Acute heart failure with reduced ejection fraction: last echo with EF <40% and receiving IV diuretics    # Acute Hypercapnic Respiratory Failure: based on venous blood gas results.  Continue supplemental oxygen and ventilatory support as indicated.        # DMII: A1C = 9.1 % (Ref range: <5.7 %) within past 6 months   # Overweight: Estimated body mass index is 27.38 kg/m  as calculated from the following:    Height as of this encounter: 1.829 m (6').    Weight as of this encounter: 91.6 kg (201 lb 14.4 oz).        #  Financial/Environmental Concerns:     # History of CABG: noted on surgical history               Current Medications:    Scheduled Meds:  Current Facility-Administered Medications   Medication Dose Route Frequency Provider Last Rate Last Admin    allopurinol (ZYLOPRIM) tablet 100 mg  100 mg Oral Daily Shania Barr PA-C   100 mg at 11/21/24 0840    aspirin (ASA) chewable tablet 162 mg  162 mg Oral or NG Tube Daily Shania Barr PA-C   162 mg at 11/21/24 0839    Or    aspirin (ASA) Suppository 300 mg  300 mg Rectal Daily Shania Barr PA-C   300 mg at 11/19/24 0917    atorvastatin (LIPITOR) tablet 40 mg  40 mg Oral QPM Shania Barr PA-C   40 mg at 11/20/24 2003    chlorhexidine (PERIDEX) 0.12 % solution 15 mL  15 mL Swish & Spit Q12H MARGARITA (08/20) Ryan Hallman MD   15 mL at 11/21/24 0751    ferrous gluconate (FERGON) tablet 324 mg  324 mg Oral Daily Shania Barr PA-C   324 mg at 11/21/24 0839    finasteride (PROSCAR) tablet 5 mg  5 mg Oral Daily Shania Barr PA-C   5 mg at 11/21/24 0840    furosemide (LASIX) injection 40 mg  40 mg Intravenous TID Shania Barr PA-C   40 mg at 11/21/24 0628    [Held by provider] glipiZIDE (GLUCOTROL XL) 24 hr tablet 2.5 mg  2.5 mg Oral Daily Shania Barr PA-ANAIS        heparin ANTICOAGULANT injection 5,000 Units  5,000 Units Subcutaneous Q8H Shania Barr PA-C   5,000 Units at 11/21/24 0343    insulin aspart (NovoLOG) injection (RAPID ACTING)  1-10 Units Subcutaneous TID AC Shania Barr PA-C   1 Units at 11/21/24 0842    insulin aspart (NovoLOG) injection (RAPID ACTING)  1-7 Units Subcutaneous At Bedtime Shania Barr PA-C        Lidocaine (LIDOCARE) 4 % Patch 1-2 patch  1-2 patch Transdermal Q24H Shania Barr PA-C   2 patch at 11/20/24 1802    magnesium oxide (MAG-OX) tablet 400 mg  400 mg Oral Q4H Tsering Evans MD   400 mg at 11/21/24 0839    metoprolol tartrate (LOPRESSOR)  half-tab 12.5 mg  12.5 mg Oral TID Sahnia Barr PA-C   12.5 mg at 11/21/24 0343    metoprolol tartrate (LOPRESSOR) tablet 25 mg  25 mg Oral BID Annie Sarabia PA-C   25 mg at 11/21/24 0839    pantoprazole (PROTONIX) 2 mg/mL suspension 40 mg  40 mg Oral or NG Tube QAM Shania Rodriguez PA-C        Or    pantoprazole (PROTONIX) EC tablet 40 mg  40 mg Oral QAM AC Shania Barr PA-C   40 mg at 11/21/24 0746    polyethylene glycol (MIRALAX) Packet 17 g  17 g Oral Daily Shania Barr PA-C   17 g at 11/21/24 0839    senna-docusate (SENOKOT-S/PERICOLACE) 8.6-50 MG per tablet 1 tablet  1 tablet Oral BID Shania Barr PA-C   1 tablet at 11/21/24 0839    sodium bicarbonate tablet 1,950 mg  1,950 mg Oral TID Shania Barr PA-C   1,950 mg at 11/21/24 0840    ursodiol (ACTIGALL) capsule 300 mg  300 mg Oral BID Shania Barr PA-C   300 mg at 11/21/24 0841     Continuous Infusions:  Current Facility-Administered Medications   Medication Dose Route Frequency Provider Last Rate Last Admin    EPINEPHrine (ADRENALIN) 5 mg in sodium chloride 0.9 % 250 mL infusion CENTRAL  0.01-0.1 mcg/kg/min Intravenous Continuous PRN Shania Barr PA-C   Stopped at 11/20/24 0823    Med Instruction - Transition from IV Insulin Infusion to Sub-Q Insulin   Does not apply Continuous PRN Shania Barr PA-C        niCARdipine 40 mg in 200 mL NS (CARDENE) infusion  0.5-15 mg/hr Intravenous Continuous PRN Shania Barr PA-C   Stopped at 11/20/24 1315    Reason beta blocker order not selected   Does not apply DOES NOT GO TO Shania Rueda PA-C         PRN Meds:.  Current Facility-Administered Medications   Medication Dose Route Frequency Provider Last Rate Last Admin    acetaminophen (TYLENOL) tablet 650 mg  650 mg Oral Q4H PRN Shania Barr PA-C        albumin human 5 % injection 12.5 g  12.5 g Intravenous Q15 Min PRN Tsering Evans MD   12.5 g at 11/18/24  2132    bisacodyl (DULCOLAX) suppository 10 mg  10 mg Rectal Daily PRN Shania Barr PA-C        calcium carbonate (TUMS) chewable tablet 1,000 mg  1,000 mg Oral TID PRN Shania Barr PA-C        calcium gluconate 1 g in 50 mL in sodium chloride intermittent infusion  1 g Intravenous Once PRN Shania Barr PA-C   1 g at 11/18/24 1928    calcium gluconate 2 g in  mL intermittent infusion  2 g Intravenous Once PRN Shania Barr PA-C        calcium gluconate 3 g in sodium chloride 0.9 % 100 mL intermittent infusion  3 g Intravenous Once PRN Shania Barr PA-C        glucose gel 15-30 g  15-30 g Oral Q15 Min PRN Shania Barr PA-C        Or    dextrose 50 % injection 25-50 mL  25-50 mL Intravenous Q15 Min PRN Shania Barr PA-C        Or    glucagon injection 1 mg  1 mg Subcutaneous Q15 Min PRN Shania Barr PA-C        EPINEPHrine (ADRENALIN) 5 mg in sodium chloride 0.9 % 250 mL infusion CENTRAL  0.01-0.1 mcg/kg/min Intravenous Continuous PRN Shania Barr PA-C   Stopped at 11/20/24 0823    gabapentin (NEURONTIN) capsule 300 mg  300 mg Oral TID PRN Shania Barr PA-C   300 mg at 11/20/24 1009    hydrALAZINE (APRESOLINE) injection 10 mg  10 mg Intravenous Q30 Min PRN Shania Barr PA-C   10 mg at 11/21/24 0743    HYDROmorphone (DILAUDID) injection 0.2 mg  0.2 mg Intravenous Q2H PRN Shania Barr PA-C   0.2 mg at 11/20/24 1019    Or    HYDROmorphone (DILAUDID) injection 0.4 mg  0.4 mg Intravenous Q2H PRN Shania Barr PA-C   0.4 mg at 11/21/24 0040    lactated ringers BOLUS 250 mL  250 mL Intravenous Q15 Min PRN Shania Barr PA-C LubriFresh P.M. OINT 1 g  1 Application Ophthalmic BID PRN Laverne Matos MD   1 g at 11/20/24 0119    magnesium hydroxide (MILK OF MAGNESIA) suspension 30 mL  30 mL Oral Daily PRN Shania Barr PA-C        Med Instruction - Transition from IV Insulin Infusion to  Sub-Q Insulin   Does not apply Continuous PRN Shania Barr PA-C        melatonin tablet 5 mg  5 mg Oral At Bedtime PRN Shania Barr PA-C        methocarbamol (ROBAXIN) tablet 500 mg  500 mg Oral 4x Daily PRN Shania Barr PA-C   500 mg at 11/20/24 2223    naloxone (NARCAN) injection 0.2 mg  0.2 mg Intravenous Q2 Min PRN Shania Barr PA-C        Or    naloxone (NARCAN) injection 0.4 mg  0.4 mg Intravenous Q2 Min PRN Shania Barr PA-C        Or    naloxone (NARCAN) injection 0.2 mg  0.2 mg Intramuscular Q2 Min PRN Shania Barr PA-C        Or    naloxone (NARCAN) injection 0.4 mg  0.4 mg Intramuscular Q2 Min PRN Shania Barr PA-C        niCARdipine 40 mg in 200 mL NS (CARDENE) infusion  0.5-15 mg/hr Intravenous Continuous PRN Shania Barr PA-C   Stopped at 11/20/24 1315    ondansetron (ZOFRAN ODT) ODT tab 4 mg  4 mg Oral Q6H PRN Shania Barr PA-C        Or    ondansetron (ZOFRAN) injection 4 mg  4 mg Intravenous Q6H PRN Shania Barr PA-C        oxyCODONE (ROXICODONE) tablet 5 mg  5 mg Oral Q4H PRN Shania Barr PA-C   5 mg at 11/21/24 0535    Or    oxyCODONE (ROXICODONE) tablet 10 mg  10 mg Oral Q4H PRN Shania Barr PA-C        prochlorperazine (COMPAZINE) injection 5 mg  5 mg Intravenous Q6H PRN Shania Barr PA-C        Or    prochlorperazine (COMPAZINE) tablet 5 mg  5 mg Oral Q6H PRN Shania Barr PA-C        Reason beta blocker order not selected   Does not apply DOES NOT GO TO Shania Rueda PA-C           Cardiographics:    Telemetry monitoring demonstrates sinus rhythm w/ rates in the 80s per my personal review.    Imaging:  Results for orders placed or performed during the hospital encounter of 11/14/24   US Carotid Bilateral    Impression    IMPRESSION:  1.  Mild plaque formation, velocities consistent with less than 50% stenosis in the right internal carotid artery.  2.  Mild  plaque formation, velocities consistent with less than 50% stenosis in the left internal carotid artery.  3.  Flow within the vertebral arteries is antegrade.   CT Chest w/o Contrast    Impression    IMPRESSION:   1.  Heavy atherosclerotic calcification aortic root and proximal ascending aorta.  2.  Moderate right and small left pleural effusions with associated compressive atelectasis/consolidation in the lung bases. Consolidative opacity left lower lobe somewhat rounded, likely representing atelectasis.  3.  Mildly enlarged mediastinal lymph nodes likely reactive.  4.  Otherwise stable exam.     PET Cardiac Viability    Impression    IMPRESSION:    Findings suggesting stunned, but viable left ventricular myocardium.   XR Chest Port 1 View    Impression    IMPRESSION: Cardiomegaly. Marker for balloon pump in the aortic arch. This could be pulled back approximately 2 cm. Findings discussed with ( nurse Key    ) clinical service at approximately 0605 hours. Cardiomegaly with mild-to-moderate pulmonary   vascular congestion. Neurostimulator leads in stable position. Small right pleural effusion suspected. The costophrenic angle has been omitted on this exam. Patchy area of atelectasis or infiltrate in the left lung base.     XR Chest Port 1 View    Impression    IMPRESSION: Radiopaque marker of the intra-aortic balloon pump at the level of the mid thoracic arch, unchanged. Mild cardiac enlargement and venous hypertension, unchanged. No significant pleural effusion on either side. Postoperative changes left   shoulder. Degenerative changes both shoulders and the spine, narrowing worse involving the left glenohumeral joint with remodeling of the left humeral head. Electrodes overlying the lower thoracic spine. Monitoring leads overlying the chest.       Labs, personally reviewed.  Hemoglobin   Date Value Ref Range Status   11/21/2024 11.6 (L) 13.3 - 17.7 g/dL Final   11/20/2024 10.4 (L) 13.3 - 17.7 g/dL Final    11/20/2024 10.5 (L) 13.3 - 17.7 g/dL Final   04/29/2021 12.2 (L) 13.3 - 17.7 g/dL Final   07/28/2020 10.7 (L) 13.3 - 17.7 g/dL Final   07/27/2020 11.7 (L) 13.3 - 17.7 g/dL Final     WBC   Date Value Ref Range Status   04/29/2021 8.5 4.0 - 11.0 10e9/L Final   07/28/2020 7.4 4.0 - 11.0 10e9/L Final   07/27/2020 10.4 4.0 - 11.0 10e9/L Final     WBC Count   Date Value Ref Range Status   11/21/2024 13.4 (H) 4.0 - 11.0 10e3/uL Final   11/20/2024 11.8 (H) 4.0 - 11.0 10e3/uL Final   11/19/2024 11.6 (H) 4.0 - 11.0 10e3/uL Final     Platelet Count   Date Value Ref Range Status   11/21/2024 284 150 - 450 10e3/uL Final   11/20/2024 239 150 - 450 10e3/uL Final   11/19/2024 248 150 - 450 10e3/uL Final   04/29/2021 250 150 - 450 10e9/L Final   07/28/2020 219 150 - 450 10e9/L Final   07/27/2020 247 150 - 450 10e9/L Final     Creatinine   Date Value Ref Range Status   11/21/2024 1.57 (H) 0.67 - 1.17 mg/dL Final   11/20/2024 1.49 (H) 0.67 - 1.17 mg/dL Final   11/19/2024 1.42 (H) 0.67 - 1.17 mg/dL Final   04/29/2021 1.19 0.66 - 1.25 mg/dL Final   07/28/2020 1.19 0.66 - 1.25 mg/dL Final   07/27/2020 1.30 (H) 0.66 - 1.25 mg/dL Final     Potassium   Date Value Ref Range Status   11/21/2024 4.0 3.4 - 5.3 mmol/L Final   11/20/2024 3.8 3.4 - 5.3 mmol/L Final   11/20/2024 3.8 3.4 - 5.3 mmol/L Final   08/27/2022 4.6 3.5 - 5.0 mmol/L Final   08/26/2022 4.8 3.5 - 5.0 mmol/L Final   08/25/2022 5.2 (H) 3.5 - 5.0 mmol/L Final   04/29/2021 4.7 3.4 - 5.3 mmol/L Final   07/28/2020 4.5 3.4 - 5.3 mmol/L Final   07/27/2020 3.9 3.4 - 5.3 mmol/L Final     Potassium POCT   Date Value Ref Range Status   11/18/2024 5.1 3.4 - 5.3 mmol/L Final   11/18/2024 5.3 3.4 - 5.3 mmol/L Final   11/18/2024 5.2 3.4 - 5.3 mmol/L Final     Magnesium   Date Value Ref Range Status   11/21/2024 2.0 1.7 - 2.3 mg/dL Final   11/20/2024 2.2 1.7 - 2.3 mg/dL Final   11/19/2024 2.6 (H) 1.7 - 2.3 mg/dL Final   01/24/2020 1.9 1.6 - 2.3 mg/dL Final   10/20/2018 2.0 1.6 - 2.3 mg/dL  Final   05/01/2018 1.8 1.6 - 2.3 mg/dL Final          I/O:  I/O last 3 completed shifts:  In: 1030.09 [P.O.:400; I.V.:630.09]  Out: 3615 [Urine:3125; Chest Tube:490]       Physical Exam:    General: Patient seen up to chair. NAD  HEENT: DONALDO, no sclera icterus, moist mucosa  CV: RRR on monitor. 2+ peripheral pulses in all extremities. Mild edema.   Pulm: Non-labored effort on RA. Chest tubes in place, no air leak. Incision C/D/I.  Abd: Soft, NT, ND  : Barnes with urban urine  Ext: Mild pedal edema, SCDs in place, warm, distal pulses intact. IABP site C/D/I  Neuro: CNs grossly intact      ASSESSMENT/PLAN:    Lance Ibrahim is a 79 year old male with a history of CAD w/ known LM stenosis, CKD1, DM2, chronic anemia nonischemic cardiomyopathy (thought to be d/t HTN) w/ chronic HFpEF, h/o CVA (bilateral PCA emboli), and spinal stenosis w/ spinal cord stimulator in place who is s/p CABG x3 .    Principal Problem:    NSTEMI (non-ST elevated myocardial infarction) (H)  Active Problems:    ST elevation MI (STEMI) (H)        NEURO:   - Scheduled Tylenol/lidocaine patches and PRN Tylenol/oxycodone/dilaudid for pain    CV:   - Pre-op EF 25-30%  - IABP removed POD#1  - Normotensive off pressors   - Metoprolol tartrate 12.5 mg BID to start later today-increased to 25 mg PO BID today  -  mg  -  Atorvastatin 40mg daily  - Chest tubes/TPW to remain today  - New baseline echo after CT/TPW removed and prior to discharge. - May need LifeVest  - Consider HF consult    PULM:   - Extubated POD#2    - Maintaining oxygen saturations on RA  - Encourage pulmonary toilet    FEN/GI:  - Diet: Clears, ADAT to Cardiac  - Continue electrolyte replacement protocol  - Bowel regimen, +BM 11/21    RENAL:  - adequate UOP/hr. Continue to monitor closely.  - Cr 1.57(1.42) (baseline ~ 1.3-1.4)  - Barnes to be removed today POD#3  - Diuresis with Lasix 40 mg IV BID-decreased from TID    HEME:  - Acute blood loss anemia post-op.   - Hgb 11.6, no active  bleeding concerns. Hep SQ, ASA  - INR 1.63 on 11/19, plan for 1u FFP  - POD#0 got 3u PRBCs and half dose factor VII    ID:  - Armida op ppx complete, afebrile. No concerns for infection    ENDO:   - HbA1c 9.1%  - BG goal < 180 to promote optimal healing  - PTA on glipizide 2.5 mg qday, lantus 10u BID - holding PO DM2 med until POD#3 pending renal function  - Transition to sliding scale insulin    PPx:   - DVT: SCDs, SQ heparin TID, ambulation   - GI: Protonix 40mg PO daily    DISPO:   - Continue critical care in ICU - consider transfer OOU later today pending hemodynamic stability off pressors.  - PT/OT recs at discharge: pending  - Medically Ready for Discharge: Anticipated in 2-4 Days      Patient discussed with Dr. Evans.      Annie Sarabia PA-C  [facility] Cardiothoracic Surgery  [place] or pager [phone]

## 2024-11-21 NOTE — PROGRESS NOTES
11/21/24 0930   Appointment Info   Signing Clinician's Name / Credentials (OT) Brenda Mares, OTR/L   Living Environment   People in Home alone   Current Living Arrangements assisted living   Living Environment Comments uses wheelchair for all mobility.   Self-Care   Equipment Currently Used at Home walker, standard;shower chair   Activity/Exercise/Self-Care Comment IND for dressing, toileting, and bathing. self transfers into wheelchair without additional AD. reports his arms complete 75% of the work during transfers   Instrumental Activities of Daily Living (IADL)   IADL Comments assist for all IADLs.   General Information   Onset of Illness/Injury or Date of Surgery 11/14/24   Referring Physician Shania Barr PA-C   Patient/Family Therapy Goal Statement (OT) return to PLOF   Additional Occupational Profile Info/Pertinent History of Current Problem PMH DM, CVA without residual symptoms, Gout, BPH,  HTN, CAD with multivessel disease s/p CABG x 2 on 11/18.   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   Cognitive Status Examination   Affect/Mental Status (Cognitive) flat/blunted affect   Follows Commands follows one-step commands   Cognitive Status Comments moaning frequently during sessions but declining pain when asked.   Pain Assessment   Patient Currently in Pain No   Range of Motion Comprehensive   Comment, General Range of Motion R arm in protected flexed position, limited functionally   Strength Comprehensive (MMT)   Comment, General Manual Muscle Testing (MMT) Assessment significant weakness   Bed Mobility   Bed Mobility supine-sit;sit-supine   Supine-Sit [place] (Bed Mobility) maximum assist (25% patient effort);2 person assist   Sit-Supine [place] (Bed Mobility) maximum assist (25% patient effort);2 person assist   Comment (Bed  Mobility) per clinical judgement   Transfers   Transfers sit-stand transfer   Sit-Stand Transfer   Sit-Stand [place] (Transfers) maximum assist (25% patient effort);2 person assist   Sit/Stand Transfer Comments partial stand   Balance   Balance Comments min A for unsupported sitting in recliner   Activities of Daily Living   BADL Assessment/Intervention lower body dressing;toileting   Lower Body Dressing Assessment/Training   [place] (Lower Body Dressing) dependent (less than 25% patient effort)   Toileting   T[place] Level (Toileting) dependent (less than 25% patient effort)   Clinical Impression   Criteria for Skilled Therapeutic Interventions Met (OT) Yes, treatment indicated   OT Diagnosis dec ADL IND   OT Problem List-Impairments impacting ADL problems related to;activity tolerance impaired;balance;mobility;strength;pain;post-surgical precautions;postural control   Assessment of Occupational Performance 3-5 Performance Deficits   Identified Performance Deficits dressing, toileting, bathing, functional transfers   Planned Therapy Interventions (OT) ADL retraining;balance training;bed mobility training;strengthening;transfer training;home program guidelines;progressive activity/exercise   Clinical Decision Making Complexity (OT) detailed assessment/moderate complexity   Risk & Benefits of therapy have been explained evaluation/treatment results reviewed;participants included;patient   OT Total Evaluation Time   OT Eval, Moderate Complexity Minutes (64739) 10   OT Goals   Therapy Frequency (OT) Daily   OT Predicted Duration/Target Date for Goal Attainment 11/29/24   OT Goals Cardiac Phase 1;Lower Body Dressing;Toilet Transfer/Toileting   OT: Lower Body Dressing Moderate assist   OT: Toilet Transfer/Toileting Moderate assist;toilet transfer;cleaning and garment management   OT: Understanding of cardiac education to maximize quality of life, condition management, and health outcomes  Patient;Verbalize   OT: Perform aerobic activity with stable cardiovascular response continuous;5 minutes  (Cubii)   Self-Care/Home Management   Self-Care/Home Mgmt/ADL, Compensatory, Meal Prep Minutes (76905) 16   Symptoms Noted During/After Treatment (Meal Preparation/Planning Training) fatigue   Treatment Detail/Skilled Intervention Educ on precautions and use of pillow for transfers. Adjusted positioning after unsuccessful transfer, achieves nearly full stand with max Ax2 but unable to achieve upright posture or take steps to commode. Eliu transfer to/from commode with total A for cares. Increased time for positioning in recliner following. Encouraged to continue ankle pumps throughout day.   Cardiac Education   Education Provided Precautions   Education Packet Given to Patient No   All Patient Education Handouts Reviewed with Patient and/or Family No   Cardiac Rehab Phase II Plan   Phase II Order Received Yes   Phase II Appointment Status Scheduled   Date/Time 12/3   Location [facility]   OT Discharge Planning   OT Plan likely cancel CR appt but see how pt progresses; transfers - attempt pivot as able, cubii, needs all CR educ   OT Discharge Recommendation (DC Rec) Transitional Care Facility   OT Rationale for DC Rec requires eliu for transfers and Ax1-2 for all BADLs.   OT Brief overview of current status eliu   Total Session Time   Timed Code Treatment Minutes 16   Total Session Time (sum of timed and untimed services) 26

## 2024-11-21 NOTE — PROGRESS NOTES
RCAT Treatment Plan    Patient Score: 5  Patient Acuity: 5    Clinical Indication for Therapy: prevent atelectasis    Therapy Ordered: aerobika QID    Assessment Summary: Pt admitted with CABG. PMH includes STEMI, NSTEMI, sepsis, obesity, CKD, and CAD. Pt on RA. BS clear. Per [facility]AT pt should do treatment BID but pt remains QID per heart surgery. RT will reeval.     RT Justice  11/21/2024

## 2024-11-21 NOTE — PROGRESS NOTES
Care Management Follow Up    Length of Stay (days): 7    Expected Discharge Date: 11/25/2024       Anticipated Discharge Plan:   TBD     Transportation: TBD     PT Recommendations:    OT Recommendations:        Barriers to Discharge: medical stability, post procedure care and monitoring     Prior Living Situation:   Pt lives alone at [facility] in [place]. Pt Ind with ADLS, uses walker (has w/c if needs, but hasn't been using). Pt is dependent with some IADLs [facility] provides meals, laundry svcs, escorts to meals, and medication set up. Pt's son Omari transports pt if needed, helps with shopping, and appointments. Pt is retired. No home care svcs at this time. Had PT/OT through the [facility]. Pt's MORENA anticipates pt returns to [facility], with home care if needed. Son Omari to transport if safe to do so.      NISA Tan at [facility] #[phone]         Discussed  Partnership in Safe Discharge Planning  document with patient/family: No      Handoff Completed: No, handoff not indicated or clinically appropriate     Patient/Spokesperson Updated: patient 11/21     Additional Information:  Medical:  Patient with PMHx of DM, CVA without residual symptoms, Gout, BPH, HTN, CAD with multivessel disease s/p CABG x 2 on 11/18.     CT Surgery following.           11/21/24:  Met with patient for touch base. He reports 2024 has been a horrible year medically. Patient has been to TCU in the past [facility]. He is not medically stable to initiate discharge plan  but anticipating he will need rehab post discharge.             Tasha Pappas RN

## 2024-11-21 NOTE — PLAN OF CARE
Goal Outcome Evaluation:      Plan of Care Reviewed With: patient    Overall Patient Progress: improvingOverall Patient Progress: improving    Outcome Evaluation: VSS, pain improving somewhat.  [facility] - ICU    RN Progress Note:            Pertinent Assessments:      Please refer to flowsheet rows for full assessment     Pt extubated this morning doing well. Currently, on 3L O2 via oxymask sats in 90s. Pt c/o chest compression/incisional pain rate 7/10. Pt has received all available pain meds with some relieve but continues  to have moan and c/o chest compression.Provider is aware. Barnes intact with adequate output. CT x2 intact, no air leak noted, minimal output. PRN hydralazine x4 given to keep SBP <140.            Key Events - This Shift:       See above                 Barriers to Discharge / Downgrade:     Lines

## 2024-11-21 NOTE — PROGRESS NOTES
11/21/24 1410   Appointment Info   Signing Clinician's Name / Credentials (PT) Estee Espinoza DPT   Living Environment   People in Home alone   Current Living Arrangements assisted living   Home Accessibility wheelchair accessible   Transportation Anticipated health plan transportation   Self-Care   Usual Activity Tolerance fair   Current Activity Tolerance poor   Equipment Currently Used at Home walker, rolling;wheelchair, manual   General Information   Onset of Illness/Injury or Date of Surgery 11/14/24   Referring Physician Shania Barr PA-C   Patient/Family Therapy Goals Statement (PT) none   Pertinent History of Current Problem (include personal factors and/or comorbidities that impact the POC) s/p CABG x3   Existing Precautions/Restrictions sternal;other (see comments)  (chest tubes)   Strength (Manual Muscle Testing)   Strength (Manual Muscle Testing) Deficits observed during functional mobility   Bed Mobility   Bed Mobility supine-sit   Supine-Sit [place] (Bed Mobility) maximum assist (25% patient effort);2 person assist   Bed Mobility Limitations decreased ability to use arms for pushing/pulling;decreased ability to use legs for bridging/pushing;impaired ability to control trunk for mobility   Impairments Contributing to Impaired Bed Mobility pain;decreased strength   Assistive Device (Bed Mobility) bed rails;draw sheet   Transfers   Transfers sit-stand transfer   Sit-Stand Transfer   Sit-Stand [place] (Transfers) moderate assist (50% patient effort);2 person assist   Assistive Device (Sit-Stand Transfers)   (cardiac pillow)   Gait/Stairs (Locomotion)   Comment, (Gait/Stairs) n/a- pt unable to ambulate functional distance yet.   Clinical Impression   Criteria for Skilled Therapeutic Intervention Yes, treatment indicated   PT Diagnosis (PT) Impaired functional mobility   Influenced by the following impairments Weakness, post-op, sternal precautions   Functional limitations due to  impairments Impaired strength, transfers   Clinical Presentation (PT Evaluation Complexity) evolving   Clinical Presentation Rationale Presents as diagnosed   Clinical Decision Making (Complexity) moderate complexity   Planned Therapy Interventions (PT) balance training;bed mobility training;home exercise program;strengthening;transfer training   Risk & Benefits of therapy have been explained patient   PT Total Evaluation Time   PT [place], Moderate Complexity Minutes (92573) 10   Physical Therapy Goals   PT Frequency 6x/week   PT Predicted Duration/Target Date for Goal Attainment 11/28/24   PT Goals Bed Mobility;Transfers;PT Goal 1   PT: Bed Mobility Minimal assist;Supine to/from sit;Within precautions   PT: Transfers Moderate assist;Sit to/from stand;Bed to/from chair;Within precautions   PT: Goal 1 Participate in LE exericse to improve overall strength needed to perform functional mobility.   PT Discharge Planning   PT Plan progress standing tolerance, transfers, LE ex   PT Discharge Recommendation (DC Rec) Transitional Care Facility   PT Rationale for DC Rec Ax2 for all mobility at this time.   PT Brief overview of current status Bed > commode > chair, mod Ax2.   PT Equipment Needed at Discharge wheelchair   Physical Therapy Time and Intention   Total Session Time (sum of timed and untimed services) 10       Estee Espinoza, PT, DPT  11/21/2024

## 2024-11-21 NOTE — PROGRESS NOTES
"CLINICAL NUTRITION SERVICES - ASSESSMENT NOTE     Nutrition Prescription    RECOMMENDATIONS FOR MDs/PROVIDERS TO ORDER:      Malnutrition Status:    Not noted    Recommendations already ordered by Registered Dietitian (RD):  Glucerna with meals for additional protein post-op.    Future/Additional Recommendations:       REASON FOR ASSESSMENT  Lance Ibrahim is a/an 79 year old male assessed by the dietitian for LOS and Provider Order - Nutrition Education - after Cardiac surgery.    Patient is s/p coronary angiogram 11/14/24 and s/p CABG 11/18/24.  History of DM, CVA, amputated toe, CKD.    NUTRITION HISTORY  Patient lives in assisted living, he eats 3 meals/day there.  Patient has an aide that wheels him to the dining room for meals as patient is unable to walk and is waiting for a knee replacement.  Patient reports he also has a dislocated shoulder and gout.  Patient wears a dexcom to help with blood sugar monitoring/control.   Patient drinks 2-3 Glucerna/day.      CURRENT NUTRITION ORDERS  Diet: Moderate Consistent Carbohydrate and Low Saturated Fat/2400 mg Sodium  Was eating 100% at meals prior to surgery.     LABS  Creatinine 1.57 (H)  Phosphorus 4.7 (H)  Glucose 164-193 (H)  Labs reviewed    MEDICATIONS  Medications reviewed    ANTHROPOMETRICS  Height: 182.9 cm (6' 0\")  Most Recent Weight: 91.6 kg (201 lb 14.4 oz)    BMI: Overweight BMI 25-29.9  Weight History:   11/21/24 : 91.6 kg (201 lb 14.4 oz)  11/14/24 : 91 kg (200 lb 9.6 oz) -[facility] admission.  11/14/24 : 87.1 kg (192 lb)- ED  09/10/24 : 88 kg (194 lb)  09/03/24 : 88 kg (194 lb)  08/30/24 : 88 kg (194 lb)  08/26/24 : 87.5 kg (193 lb)  08/22/24 : 87.6 kg (193 lb 3.2 oz)  08/19/24 : 86.6 kg (191 lb)  08/04/24 : 86.6 kg (191 lb)  07/29/24 : 86.2 kg (190 lb)     Dosing Weight: 91.6 kg,     ASSESSED NUTRITION NEEDS  Estimated Energy Needs: 1800+ kcals/day (20 + kcals/kg)  Justification: Overweight and Post-op  Estimated Protein Needs: 73+ grams protein/day " (0.8+ grams of pro/kg)  Justification: CKD and Post-op  Estimated Fluid Needs: 1800+ mL/day (1 mL/kcal)   Justification: Maintenance    PHYSICAL FINDINGS  See malnutrition section below.  Per chart:  Last BM 11/17/24   Surgical incisions   Mild generalized edema      MALNUTRITION:  % Weight Loss:  None noted  % Intake:  Decreased intake does not meet criteria for malnutrition Npo x 2 days for surgery.    Subcutaneous Fat Loss:  None observed  Muscle Loss:  None observed  Fluid Retention:  Mild per chart    Malnutrition Diagnosis: Patient does not meet two of the above criteria necessary for diagnosing malnutrition      NUTRITION DIAGNOSIS  Nutrition Knowledge deficit related to heart disease as evidenced by need for therapeutic diet.      INTERVENTIONS  Implementation  Nutrition Education: Provided education on Low fat, low sodium diet.  Patient lives in assisted living and is limited by meal choices offered.  Provided written information.    Medical food supplement therapy -glucerna with meals.     Goals  Participate in diet ed-met  Patient to consume % of nutritionally adequate meals three times per day, or the equivalent with supplements/snacks.     Monitoring/Evaluation  Progress toward goals will be monitored and evaluated per protocol.

## 2024-11-22 ENCOUNTER — APPOINTMENT (OUTPATIENT)
Dept: PHYSICAL THERAPY | Facility: HOSPITAL | Age: 79
End: 2024-11-22
Attending: INTERNAL MEDICINE
Payer: COMMERCIAL

## 2024-11-22 ENCOUNTER — APPOINTMENT (OUTPATIENT)
Dept: OCCUPATIONAL THERAPY | Facility: HOSPITAL | Age: 79
End: 2024-11-22
Attending: INTERNAL MEDICINE
Payer: COMMERCIAL

## 2024-11-22 LAB
CA-I BLD-MCNC: 4.5 MG/DL (ref 4.4–5.2)
GLUCOSE BLDC GLUCOMTR-MCNC: 158 MG/DL (ref 70–99)
GLUCOSE BLDC GLUCOMTR-MCNC: 165 MG/DL (ref 70–99)
GLUCOSE BLDC GLUCOMTR-MCNC: 167 MG/DL (ref 70–99)
GLUCOSE BLDC GLUCOMTR-MCNC: 199 MG/DL (ref 70–99)
MAGNESIUM SERPL-MCNC: 2 MG/DL (ref 1.7–2.3)
PHOSPHATE SERPL-MCNC: 3.4 MG/DL (ref 2.5–4.5)
PLATELET # BLD AUTO: 287 10E3/UL (ref 150–450)
POTASSIUM SERPL-SCNC: 3.3 MMOL/L (ref 3.4–5.3)
POTASSIUM SERPL-SCNC: 3.6 MMOL/L (ref 3.4–5.3)

## 2024-11-22 PROCEDURE — 250N000013 HC RX MED GY IP 250 OP 250 PS 637: Performed by: PHYSICIAN ASSISTANT

## 2024-11-22 PROCEDURE — 83735 ASSAY OF MAGNESIUM: CPT | Performed by: THORACIC SURGERY (CARDIOTHORACIC VASCULAR SURGERY)

## 2024-11-22 PROCEDURE — 36415 COLL VENOUS BLD VENIPUNCTURE: CPT | Performed by: THORACIC SURGERY (CARDIOTHORACIC VASCULAR SURGERY)

## 2024-11-22 PROCEDURE — 250N000011 HC RX IP 250 OP 636: Performed by: PHYSICIAN ASSISTANT

## 2024-11-22 PROCEDURE — 85049 AUTOMATED PLATELET COUNT: CPT

## 2024-11-22 PROCEDURE — 250N000013 HC RX MED GY IP 250 OP 250 PS 637

## 2024-11-22 PROCEDURE — 999N000156 HC STATISTIC RCP CONSULT EA 30 MIN

## 2024-11-22 PROCEDURE — 250N000011 HC RX IP 250 OP 636

## 2024-11-22 PROCEDURE — 36415 COLL VENOUS BLD VENIPUNCTURE: CPT

## 2024-11-22 PROCEDURE — 94799 UNLISTED PULMONARY SVC/PX: CPT

## 2024-11-22 PROCEDURE — 97530 THERAPEUTIC ACTIVITIES: CPT | Mod: GP

## 2024-11-22 PROCEDURE — 84132 ASSAY OF SERUM POTASSIUM: CPT | Performed by: THORACIC SURGERY (CARDIOTHORACIC VASCULAR SURGERY)

## 2024-11-22 PROCEDURE — 97110 THERAPEUTIC EXERCISES: CPT | Mod: GP

## 2024-11-22 PROCEDURE — 82330 ASSAY OF CALCIUM: CPT

## 2024-11-22 PROCEDURE — 250N000013 HC RX MED GY IP 250 OP 250 PS 637: Performed by: THORACIC SURGERY (CARDIOTHORACIC VASCULAR SURGERY)

## 2024-11-22 PROCEDURE — 999N000157 HC STATISTIC RCP TIME EA 10 MIN

## 2024-11-22 PROCEDURE — 250N000011 HC RX IP 250 OP 636: Performed by: THORACIC SURGERY (CARDIOTHORACIC VASCULAR SURGERY)

## 2024-11-22 PROCEDURE — 210N000001 HC R&B IMCU HEART CARE

## 2024-11-22 PROCEDURE — 84100 ASSAY OF PHOSPHORUS: CPT | Performed by: THORACIC SURGERY (CARDIOTHORACIC VASCULAR SURGERY)

## 2024-11-22 PROCEDURE — 97110 THERAPEUTIC EXERCISES: CPT | Mod: GO

## 2024-11-22 PROCEDURE — 97535 SELF CARE MNGMENT TRAINING: CPT | Mod: GO

## 2024-11-22 RX ORDER — MAGNESIUM SULFATE HEPTAHYDRATE 40 MG/ML
2 INJECTION, SOLUTION INTRAVENOUS ONCE
Status: COMPLETED | OUTPATIENT
Start: 2024-11-22 | End: 2024-11-22

## 2024-11-22 RX ORDER — FUROSEMIDE 10 MG/ML
20 INJECTION INTRAMUSCULAR; INTRAVENOUS
Status: DISCONTINUED | OUTPATIENT
Start: 2024-11-22 | End: 2024-11-23

## 2024-11-22 RX ORDER — POTASSIUM CHLORIDE 1500 MG/1
40 TABLET, EXTENDED RELEASE ORAL ONCE
Status: COMPLETED | OUTPATIENT
Start: 2024-11-22 | End: 2024-11-22

## 2024-11-22 RX ORDER — POTASSIUM CHLORIDE 1500 MG/1
20 TABLET, EXTENDED RELEASE ORAL ONCE
Status: COMPLETED | OUTPATIENT
Start: 2024-11-22 | End: 2024-11-22

## 2024-11-22 RX ADMIN — INSULIN ASPART 3 UNITS: 100 INJECTION, SOLUTION INTRAVENOUS; SUBCUTANEOUS at 16:38

## 2024-11-22 RX ADMIN — METOPROLOL TARTRATE 25 MG: 25 TABLET, FILM COATED ORAL at 20:35

## 2024-11-22 RX ADMIN — ALLOPURINOL 100 MG: 100 TABLET ORAL at 08:12

## 2024-11-22 RX ADMIN — METOPROLOL TARTRATE 12.5 MG: 25 TABLET, FILM COATED ORAL at 04:51

## 2024-11-22 RX ADMIN — INSULIN GLARGINE 10 UNITS: 100 INJECTION, SOLUTION SUBCUTANEOUS at 08:11

## 2024-11-22 RX ADMIN — INSULIN GLARGINE 10 UNITS: 100 INJECTION, SOLUTION SUBCUTANEOUS at 20:49

## 2024-11-22 RX ADMIN — URSODIOL 300 MG: 300 CAPSULE ORAL at 20:34

## 2024-11-22 RX ADMIN — MAGNESIUM SULFATE HEPTAHYDRATE 2 G: 40 INJECTION, SOLUTION INTRAVENOUS at 04:50

## 2024-11-22 RX ADMIN — ACETAMINOPHEN 650 MG: 325 TABLET ORAL at 08:11

## 2024-11-22 RX ADMIN — INSULIN ASPART 2 UNITS: 100 INJECTION, SOLUTION INTRAVENOUS; SUBCUTANEOUS at 08:12

## 2024-11-22 RX ADMIN — LIDOCAINE 2 PATCH: 4 PATCH TOPICAL at 16:45

## 2024-11-22 RX ADMIN — ATORVASTATIN CALCIUM 40 MG: 40 TABLET, FILM COATED ORAL at 20:35

## 2024-11-22 RX ADMIN — SODIUM BICARBONATE 650 MG TABLET 1950 MG: at 08:09

## 2024-11-22 RX ADMIN — FINASTERIDE 5 MG: 5 TABLET, FILM COATED ORAL at 08:11

## 2024-11-22 RX ADMIN — SODIUM BICARBONATE 650 MG TABLET 1950 MG: at 14:03

## 2024-11-22 RX ADMIN — SODIUM BICARBONATE 650 MG TABLET 1950 MG: at 20:34

## 2024-11-22 RX ADMIN — FUROSEMIDE 20 MG: 10 INJECTION, SOLUTION INTRAMUSCULAR; INTRAVENOUS at 17:31

## 2024-11-22 RX ADMIN — INSULIN ASPART 2 UNITS: 100 INJECTION, SOLUTION INTRAVENOUS; SUBCUTANEOUS at 13:11

## 2024-11-22 RX ADMIN — FUROSEMIDE 40 MG: 10 INJECTION, SOLUTION INTRAMUSCULAR; INTRAVENOUS at 08:11

## 2024-11-22 RX ADMIN — POTASSIUM CHLORIDE 20 MEQ: 1500 TABLET, EXTENDED RELEASE ORAL at 13:10

## 2024-11-22 RX ADMIN — HEPARIN SODIUM 5000 UNITS: 5000 INJECTION, SOLUTION INTRAVENOUS; SUBCUTANEOUS at 13:10

## 2024-11-22 RX ADMIN — HEPARIN SODIUM 5000 UNITS: 5000 INJECTION, SOLUTION INTRAVENOUS; SUBCUTANEOUS at 20:34

## 2024-11-22 RX ADMIN — METOPROLOL TARTRATE 12.5 MG: 25 TABLET, FILM COATED ORAL at 13:10

## 2024-11-22 RX ADMIN — POTASSIUM CHLORIDE 40 MEQ: 1500 TABLET, EXTENDED RELEASE ORAL at 04:50

## 2024-11-22 RX ADMIN — METOPROLOL TARTRATE 25 MG: 25 TABLET, FILM COATED ORAL at 08:11

## 2024-11-22 RX ADMIN — ASPIRIN 81 MG CHEWABLE TABLET 162 MG: 81 TABLET CHEWABLE at 08:11

## 2024-11-22 RX ADMIN — PANTOPRAZOLE SODIUM 40 MG: 40 TABLET, DELAYED RELEASE ORAL at 08:20

## 2024-11-22 RX ADMIN — URSODIOL 300 MG: 300 CAPSULE ORAL at 08:11

## 2024-11-22 RX ADMIN — METOPROLOL TARTRATE 12.5 MG: 25 TABLET, FILM COATED ORAL at 16:44

## 2024-11-22 RX ADMIN — HEPARIN SODIUM 5000 UNITS: 5000 INJECTION, SOLUTION INTRAVENOUS; SUBCUTANEOUS at 04:49

## 2024-11-22 RX ADMIN — FERROUS GLUCONATE 324 MG: 324 TABLET ORAL at 08:11

## 2024-11-22 NOTE — PROGRESS NOTES
..sc  Patient Name: Lance Ibrahim   MRN: [id number]   Date of Admission: 11/14/2024    Procedure: Procedure(s):  CORONARY ARTERY BYPASS GRAFT TIMES THREE, WITH LEFT INTERNAL MAMARARY ARTERY HARVEST, LEFT ENDOSCOPIC VESSEL PROCUREMENT, EPIAORTIC ULTRASOUND  ECHOCARDIOGRAM, TRANSESOPHAGEAL, INTRAOPERATIVE    Post Op day #:04    Subjective (Patient focus/Primary Problem for shift): Pain level ,activity level          Pain Goal0 Pain Rating0           Pain Medication/ Regime effective to reduce patient pain Lidocaine patch    Objective (Physical assessment):           Rhythm: sinus arrhythmia with AVB and BBB            Bowel Activity: yes if Yes indicate when: today         Bowel Medications: no            Incision: healing well          Incentive Spirometry Q 1-2 hour when awake:  yes Volume: 750          Epicardial Pacing Wires:  not applicable            Patient Activity:           Up to chair for meals: yes          Ambulation with RN x2 (Not including CR): no           Shower Daily {YES/NO/NA:147154          Nasal  Nozin BID AM/PM x 10 days: No              Chest Tubes   Pleural: not applicable Draining: not applicable               Suction: not applicable              Mediastinal: not applicable Draining: not applicable               Suction: not applicable   Dressing Change Daily:not applicable If No, why?N/A                     Urinary Catheter: not applicable           Preventative WOC consult (need MD order): not applicable       Assessment (Nursing primary shift focus): Pain level    Plan (Patient Care Plan/focus): continue to monitor pain level encourage IS use and to encourage to increase activity      Concepcion Herrera RN   11/22/2024   5:56 PM

## 2024-11-22 NOTE — PROGRESS NOTES
Care Management Follow Up    Length of Stay (days): 8    Expected Discharge Date: 11/26/2024       Anticipated Discharge Plan:   TCU     Transportation: TCU     PT Recommendations:    OT Recommendations:        Barriers to Discharge: medical stability, post procedure care and monitoring     Prior Living Situation:   Pt lives alone at [facility] in [place]. Pt Ind with ADLS, uses walker (has w/c if needs, but hasn't been using). Pt is dependent with some IADLs [facility] provides meals, laundry svcs, escorts to meals, and medication set up. Pt's howie Salcido transports pt if needed, helps with shopping, and appointments. Pt is retired. No home care svcs at this time. Had PT/OT through the [facility]. Pt's MORENA anticipates pt returns to [facility], with home care if needed. Howie Salcido to transport if safe to do so.      NISA Tan at [facility] #[phone]         Discussed  Partnership in Safe Discharge Planning  document with patient/family: No      Handoff Completed: No, handoff not indicated or clinically appropriate     Patient/Spokesperson Updated: patient and son Omrai 11/22     Additional Information:  Medical:  Patient with PMHx of DM, CVA without residual symptoms, Gout, BPH, HTN, CAD with multivessel disease s/p CABG x 2 on 11/18.     CT Surgery following.           11/22/24:  Spoke with patient and son Omari regarding recommendation for TCU. Both state agreement. They are concerned about coverage as patient acquired a bill last TCU stay.  They question if patient could return to [facility] as Omari states [facility] can provide two person assist.  If TCU, preference is for [facility]  . No other preferences at this time but they will review TCU list provided. Referral sent to [facility] . Daina castellon needs to be sent closer to discharge and if going to TCU.         Tasha Pappas RN

## 2024-11-22 NOTE — PLAN OF CARE
Goal Outcome Evaluation:      Plan of Care Reviewed With: patient    Overall Patient Progress: improvingOverall Patient Progress: improving    Outcome Evaluation: Slept well with use of Melatonin. Denies pain but moans frequently. PRN gabapentin given to help with sleep/pain. Pacer wires continue capped. Chest tubes with small and moderate drainage. External catheter placed as patient incontinent. Incontinent of stool x2.

## 2024-11-22 NOTE — PROGRESS NOTES
CVTS Daily Progress Note   POD#4 s/p CABG x3 (LIMA>LAD, rSVG>RI, rSVG>distal OM) , LLE EVH w/ IABP in place  Attending: Nathan  LOS: 8    SUBJECTIVE/INTERVAL EVENTS:    No acute events overnight.. Patient progressing well. Maintaining oxygen saturations on RA. Normotensive. Some ambulation with PT. Pain well controlled.  + BM / + flatus. Tolerating diet without nausea. UOP adequate. Chest tube output appropriate. Hgb stable. Answered all pts questions today. Kash CP, SOB, abd pain or calf pain.    OBJECTIVE:  Temp:  [97.8  F (36.6  C)-98.4  F (36.9  C)] 98  F (36.7  C)  Pulse:  [80-97] 81  Resp:  [12-28] 27  BP: ()/(54-87) 90/60  SpO2:  [89 %-99 %] 94 %  Vitals:    11/17/24 0200 11/18/24 0500 11/19/24 0200 11/21/24 0040   Weight: 91.4 kg (201 lb 8 oz) 92.4 kg (203 lb 11.2 oz) 93.4 kg (205 lb 14.6 oz) 91.6 kg (201 lb 14.4 oz)    11/22/24 0600   Weight: 87.5 kg (192 lb 14.4 oz)       Clinically Significant Risk Factors        # Hypokalemia: Lowest K = 3.3 mmol/L in last 2 days, will replace as needed        # Hypoalbuminemia: Lowest albumin = 2.6 g/dL at 11/16/2024  4:06 AM, will monitor as appropriate     # Hypertension: Noted on problem list    # Acute heart failure with reduced ejection fraction: last echo with EF <40% and receiving IV diuretics    # Acute Hypercapnic Respiratory Failure: based on venous blood gas results.  Continue supplemental oxygen and ventilatory support as indicated.        # DMII: A1C = 9.1 % (Ref range: <5.7 %) within past 6 months   # Overweight: Estimated body mass index is 26.16 kg/m  as calculated from the following:    Height as of this encounter: 1.829 m (6').    Weight as of this encounter: 87.5 kg (192 lb 14.4 oz).        # Financial/Environmental Concerns:     # History of CABG: noted on surgical history               Current Medications:    Scheduled Meds:  Current Facility-Administered Medications   Medication Dose Route Frequency Provider Last Rate Last Admin     allopurinol (ZYLOPRIM) tablet 100 mg  100 mg Oral Daily Shania Barr PA-C   100 mg at 11/22/24 0812    aspirin (ASA) chewable tablet 162 mg  162 mg Oral or NG Tube Daily Shania Barr PA-C   162 mg at 11/22/24 0811    Or    aspirin (ASA) Suppository 300 mg  300 mg Rectal Daily Shania Barr PA-C   300 mg at 11/19/24 0917    atorvastatin (LIPITOR) tablet 40 mg  40 mg Oral QPM Shania Barr PA-C   40 mg at 11/21/24 2011    chlorhexidine (PERIDEX) 0.12 % solution 15 mL  15 mL Swish & Spit Q12H [facility] (08/20) Ryan Hallman MD   15 mL at 11/21/24 2017    ferrous gluconate (FERGON) tablet 324 mg  324 mg Oral Daily Shania Barr PA-C   324 mg at 11/22/24 0811    finasteride (PROSCAR) tablet 5 mg  5 mg Oral Daily Shania Barr PA-C   5 mg at 11/22/24 0811    furosemide (LASIX) injection 40 mg  40 mg Intravenous BID Annie Sarabia PA-C   40 mg at 11/22/24 0811    [Held by provider] glipiZIDE (GLUCOTROL XL) 24 hr tablet 2.5 mg  2.5 mg Oral Daily Shania Barr PA-ANAIS        heparin ANTICOAGULANT injection 5,000 Units  5,000 Units Subcutaneous Q8H Shania Barr PA-C   5,000 Units at 11/22/24 0449    insulin aspart (NovoLOG) injection (RAPID ACTING)  1-10 Units Subcutaneous TID AC Shania Barr PA-C   2 Units at 11/22/24 0812    insulin aspart (NovoLOG) injection (RAPID ACTING)  1-7 Units Subcutaneous At Bedtime Shania Barr PA-C        insulin glargine (LANTUS PEN) injection 10 Units  10 Units Subcutaneous BID Annie Sarabia PA-C   10 Units at 11/22/24 0811    Lidocaine (LIDOCARE) 4 % Patch 1-2 patch  1-2 patch Transdermal Q24H Shania Barr PA-C   1 patch at 11/21/24 1631    metoprolol tartrate (LOPRESSOR) half-tab 12.5 mg  12.5 mg Oral TID Shania Barr PA-C   12.5 mg at 11/22/24 0451    metoprolol tartrate (LOPRESSOR) tablet 25 mg  25 mg Oral BID Annie Sarabia PA-C   25 mg at 11/22/24 0811    pantoprazole (PROTONIX) 2 mg/mL  suspension 40 mg  40 mg Oral or NG Tube QAM Shania Rodriguez PA-C        Or    pantoprazole (PROTONIX) EC tablet 40 mg  40 mg Oral QAM AC Shania Barr PA-C   40 mg at 11/22/24 0820    polyethylene glycol (MIRALAX) Packet 17 g  17 g Oral Daily Shania Barr PA-C   17 g at 11/21/24 0839    senna-docusate (SENOKOT-S/PERICOLACE) 8.6-50 MG per tablet 1 tablet  1 tablet Oral BID Shania Barr PA-C   1 tablet at 11/21/24 0839    sodium bicarbonate tablet 1,950 mg  1,950 mg Oral TID Shania Barr PA-C   1,950 mg at 11/22/24 0809    ursodiol (ACTIGALL) capsule 300 mg  300 mg Oral BID Shania Barr PA-C   300 mg at 11/22/24 0811     Continuous Infusions:  Current Facility-Administered Medications   Medication Dose Route Frequency Provider Last Rate Last Admin    EPINEPHrine (ADRENALIN) 5 mg in sodium chloride 0.9 % 250 mL infusion CENTRAL  0.01-0.1 mcg/kg/min Intravenous Continuous PRN Shania Barr PA-C   Stopped at 11/20/24 0823    Med Instruction - Transition from IV Insulin Infusion to Sub-Q Insulin   Does not apply Continuous PRN Shania Barr PA-C        niCARdipine 40 mg in 200 mL NS (CARDENE) infusion  0.5-15 mg/hr Intravenous Continuous PRN Shania Barr PA-C   Stopped at 11/20/24 1315    Reason beta blocker order not selected   Does not apply DOES NOT GO TO Shania Rueda PA-C         PRN Meds:.  Current Facility-Administered Medications   Medication Dose Route Frequency Provider Last Rate Last Admin    acetaminophen (TYLENOL) tablet 650 mg  650 mg Oral Q4H PRN Shania Barr PA-C   650 mg at 11/22/24 0811    albumin human 5 % injection 12.5 g  12.5 g Intravenous Q15 Min PRN Tsering Evans MD   12.5 g at 11/18/24 2132    bisacodyl (DULCOLAX) suppository 10 mg  10 mg Rectal Daily PRN Shania Barr PA-C        calcium carbonate (TUMS) chewable tablet 1,000 mg  1,000 mg Oral TID PRN Shania Barr PA-C         calcium gluconate 1 g in 50 mL in sodium chloride intermittent infusion  1 g Intravenous Once PRN Shania Barr PA-C   1 g at 11/18/24 1928    calcium gluconate 2 g in  mL intermittent infusion  2 g Intravenous Once PRN Shania Barr PA-C        calcium gluconate 3 g in sodium chloride 0.9 % 100 mL intermittent infusion  3 g Intravenous Once PRN Shania Barr PA-C        glucose gel 15-30 g  15-30 g Oral Q15 Min PRN Shania Barr PA-C        Or    dextrose 50 % injection 25-50 mL  25-50 mL Intravenous Q15 Min PRN Shania Barr PA-C        Or    glucagon injection 1 mg  1 mg Subcutaneous Q15 Min PRN Shania Barr PA-C        EPINEPHrine (ADRENALIN) 5 mg in sodium chloride 0.9 % 250 mL infusion CENTRAL  0.01-0.1 mcg/kg/min Intravenous Continuous PRN Shania Barr PA-C   Stopped at 11/20/24 0823    gabapentin (NEURONTIN) capsule 300 mg  300 mg Oral TID PRN Shania Barr PA-C   300 mg at 11/21/24 2155    hydrALAZINE (APRESOLINE) injection 10 mg  10 mg Intravenous Q30 Min PRN Shania Barr PA-C   10 mg at 11/21/24 0743    HYDROmorphone (DILAUDID) injection 0.2 mg  0.2 mg Intravenous Q2H PRN Shania Barr PA-C   0.2 mg at 11/20/24 1019    Or    HYDROmorphone (DILAUDID) injection 0.4 mg  0.4 mg Intravenous Q2H PRN Shania Barr PA-C   0.4 mg at 11/21/24 0040    lactated ringers BOLUS 250 mL  250 mL Intravenous Q15 Min PRN Shania Barr PA-C        LubriFresh P.M. OINT 1 g  1 Application Ophthalmic BID PRN Laverne Matos MD   1 g at 11/20/24 0119    magnesium hydroxide (MILK OF MAGNESIA) suspension 30 mL  30 mL Oral Daily PRN Shania Barr PA-C        Med Instruction - Transition from IV Insulin Infusion to Sub-Q Insulin   Does not apply Continuous PRN Shania Barr PA-C        melatonin tablet 5 mg  5 mg Oral At Bedtime PRN Shania Barr PA-C   5 mg at 11/21/24 3287    methocarbamol (ROBAXIN) tablet  500 mg  500 mg Oral 4x Daily PRN Shania Barr PA-C   500 mg at 11/20/24 2223    naloxone (NARCAN) injection 0.2 mg  0.2 mg Intravenous Q2 Min PRN Shania Barr PA-C        Or    naloxone (NARCAN) injection 0.4 mg  0.4 mg Intravenous Q2 Min PRN Shania Barr PA-C        Or    naloxone (NARCAN) injection 0.2 mg  0.2 mg Intramuscular Q2 Min PRN Shania Barr PA-C        Or    naloxone (NARCAN) injection 0.4 mg  0.4 mg Intramuscular Q2 Min PRN Shania Barr PA-C        niCARdipine 40 mg in 200 mL NS (CARDENE) infusion  0.5-15 mg/hr Intravenous Continuous PRN Shania Barr PA-C   Stopped at 11/20/24 1315    ondansetron (ZOFRAN ODT) ODT tab 4 mg  4 mg Oral Q6H PRN Shania Barr PA-C        Or    ondansetron (ZOFRAN) injection 4 mg  4 mg Intravenous Q6H PRN Shania Barr PA-C        oxyCODONE (ROXICODONE) tablet 5 mg  5 mg Oral Q4H PRN Shania Barr PA-C   5 mg at 11/21/24 1629    Or    oxyCODONE (ROXICODONE) tablet 10 mg  10 mg Oral Q4H PRN Shania Barr PA-C        prochlorperazine (COMPAZINE) injection 5 mg  5 mg Intravenous Q6H PRN Shania Barr PA-C        Or    prochlorperazine (COMPAZINE) tablet 5 mg  5 mg Oral Q6H PRN Shania Barr PA-C        Reason beta blocker order not selected   Does not apply DOES NOT GO TO Shania Rueda PA-C           Cardiographics:    Telemetry monitoring demonstrates sinus rhythm w/ rates in the 80s per my personal review.    Imaging:  Results for orders placed or performed during the hospital encounter of 11/14/24   US Carotid Bilateral    Impression    IMPRESSION:  1.  Mild plaque formation, velocities consistent with less than 50% stenosis in the right internal carotid artery.  2.  Mild plaque formation, velocities consistent with less than 50% stenosis in the left internal carotid artery.  3.  Flow within the vertebral arteries is antegrade.   CT Chest w/o Contrast     Impression    IMPRESSION:   1.  Heavy atherosclerotic calcification aortic root and proximal ascending aorta.  2.  Moderate right and small left pleural effusions with associated compressive atelectasis/consolidation in the lung bases. Consolidative opacity left lower lobe somewhat rounded, likely representing atelectasis.  3.  Mildly enlarged mediastinal lymph nodes likely reactive.  4.  Otherwise stable exam.     PET Cardiac Viability    Impression    IMPRESSION:    Findings suggesting stunned, but viable left ventricular myocardium.   XR Chest Port 1 View    Impression    IMPRESSION: Cardiomegaly. Marker for balloon pump in the aortic arch. This could be pulled back approximately 2 cm. Findings discussed with ( nurse Key    ) clinical service at approximately 0605 hours. Cardiomegaly with mild-to-moderate pulmonary   vascular congestion. Neurostimulator leads in stable position. Small right pleural effusion suspected. The costophrenic angle has been omitted on this exam. Patchy area of atelectasis or infiltrate in the left lung base.     XR Chest Port 1 View    Impression    IMPRESSION: Radiopaque marker of the intra-aortic balloon pump at the level of the mid thoracic arch, unchanged. Mild cardiac enlargement and venous hypertension, unchanged. No significant pleural effusion on either side. Postoperative changes left   shoulder. Degenerative changes both shoulders and the spine, narrowing worse involving the left glenohumeral joint with remodeling of the left humeral head. Electrodes overlying the lower thoracic spine. Monitoring leads overlying the chest.       Labs, personally reviewed.  Hemoglobin   Date Value Ref Range Status   11/21/2024 11.6 (L) 13.3 - 17.7 g/dL Final   11/20/2024 10.4 (L) 13.3 - 17.7 g/dL Final   11/20/2024 10.5 (L) 13.3 - 17.7 g/dL Final   04/29/2021 12.2 (L) 13.3 - 17.7 g/dL Final   07/28/2020 10.7 (L) 13.3 - 17.7 g/dL Final   07/27/2020 11.7 (L) 13.3 - 17.7 g/dL Final     WBC   Date  Value Ref Range Status   04/29/2021 8.5 4.0 - 11.0 10e9/L Final   07/28/2020 7.4 4.0 - 11.0 10e9/L Final   07/27/2020 10.4 4.0 - 11.0 10e9/L Final     WBC Count   Date Value Ref Range Status   11/21/2024 13.4 (H) 4.0 - 11.0 10e3/uL Final   11/20/2024 11.8 (H) 4.0 - 11.0 10e3/uL Final   11/19/2024 11.6 (H) 4.0 - 11.0 10e3/uL Final     Platelet Count   Date Value Ref Range Status   11/22/2024 287 150 - 450 10e3/uL Final   11/21/2024 284 150 - 450 10e3/uL Final   11/20/2024 239 150 - 450 10e3/uL Final   04/29/2021 250 150 - 450 10e9/L Final   07/28/2020 219 150 - 450 10e9/L Final   07/27/2020 247 150 - 450 10e9/L Final     Creatinine   Date Value Ref Range Status   11/21/2024 1.57 (H) 0.67 - 1.17 mg/dL Final   11/20/2024 1.49 (H) 0.67 - 1.17 mg/dL Final   11/19/2024 1.42 (H) 0.67 - 1.17 mg/dL Final   04/29/2021 1.19 0.66 - 1.25 mg/dL Final   07/28/2020 1.19 0.66 - 1.25 mg/dL Final   07/27/2020 1.30 (H) 0.66 - 1.25 mg/dL Final     Potassium   Date Value Ref Range Status   11/22/2024 3.6 3.4 - 5.3 mmol/L Final   11/22/2024 3.3 (L) 3.4 - 5.3 mmol/L Final   11/21/2024 4.0 3.4 - 5.3 mmol/L Final   08/27/2022 4.6 3.5 - 5.0 mmol/L Final   08/26/2022 4.8 3.5 - 5.0 mmol/L Final   08/25/2022 5.2 (H) 3.5 - 5.0 mmol/L Final   04/29/2021 4.7 3.4 - 5.3 mmol/L Final   07/28/2020 4.5 3.4 - 5.3 mmol/L Final   07/27/2020 3.9 3.4 - 5.3 mmol/L Final     Potassium POCT   Date Value Ref Range Status   11/18/2024 5.1 3.4 - 5.3 mmol/L Final   11/18/2024 5.3 3.4 - 5.3 mmol/L Final   11/18/2024 5.2 3.4 - 5.3 mmol/L Final     Magnesium   Date Value Ref Range Status   11/22/2024 2.0 1.7 - 2.3 mg/dL Final   11/21/2024 2.0 1.7 - 2.3 mg/dL Final   11/20/2024 2.2 1.7 - 2.3 mg/dL Final   01/24/2020 1.9 1.6 - 2.3 mg/dL Final   10/20/2018 2.0 1.6 - 2.3 mg/dL Final   05/01/2018 1.8 1.6 - 2.3 mg/dL Final          I/O:  I/O last 3 completed shifts:  In: 57 [I.V.:57]  Out: 2345 [Urine:2150; Chest Tube:195]       Physical Exam:    General: Patient seen up to  chair. NAD  HEENT: DONALDO, no sclera icterus, moist mucosa  CV: RRR on monitor. 2+ peripheral pulses in all extremities. Mild edema.   Pulm: Non-labored effort on RA. Chest tubes in place, no air leak. Incision C/D/I.  Abd: Soft, NT, ND  : Voiding  Ext: Mild pedal edema, SCDs in place, warm, distal pulses intact. IABP site C/D/I  Neuro: CNs grossly intact      ASSESSMENT/PLAN:    Lance Ibrahim is a 79 year old male with a history of CAD w/ known LM stenosis, CKD1, DM2, chronic anemia nonischemic cardiomyopathy (thought to be d/t HTN) w/ chronic HFpEF, h/o CVA (bilateral PCA emboli), and spinal stenosis w/ spinal cord stimulator in place who is s/p CABG x3 .    Principal Problem:    NSTEMI (non-ST elevated myocardial infarction) (H)  Active Problems:    ST elevation MI (STEMI) (H)        NEURO:   - Scheduled Tylenol/lidocaine patches and PRN Tylenol/oxycodone for pain    CV:   - Pre-op EF 25-30%  - IABP removed POD#1  - Normotensive   - Metoprolol 25 mg PO BID with parameters  -  mg  -  Atorvastatin 40mg daily  - Chest tubes/TPW removed today without immediate complications  - New Baseline Echo before discharge - May need LifeVest  - Consider HF consult    PULM:   - Extubated POD#2    - Maintaining oxygen saturations on RA  - Encourage pulmonary toilet    FEN/GI:  - Diet: Cardiac, 60g CHO per meal  - Continue electrolyte replacement protocol  - Bowel regimen, +BM 11/21    RENAL:  - adequate UOP/hr. Continue to monitor closely.  - Cr 1.57(1.42) (baseline ~ 1.3-1.4)  - Barnes removed POD#3  - Diuresis with Lasix 20 mg IV BID    HEME:  - Acute blood loss anemia post-op.   - Hgb 11.6, no active bleeding concerns. Hep SQ, ASA  - INR 1.63 on 11/19, plan for 1u FFP  - POD#0 got 3u PRBCs and half dose factor VII    ID:  - Armida op ppx complete, afebrile. No concerns for infection    ENDO:   - HbA1c 9.1%  - BG goal < 180 to promote optimal healing  - PTA on glipizide 2.5 mg qday, lantus 10u BID - holding PO DM2 med until  POD#4 pending renal function  - Transition to sliding scale insulin    PPx:   - DVT: SCDs, SQ heparin TID, ambulation   - GI: Protonix 40mg PO daily    DISPO:   - Transfer to general telemetry unit  - PT/OT recs at discharge: TCU  - Medically Ready for Discharge: Anticipated in 2-4 Days      Patient discussed with Dr. Evans.      Annie Sarabia PA-C  [facility] Cardiothoracic Surgery  [facility]

## 2024-11-22 NOTE — PLAN OF CARE
"Goal Outcome Evaluation:      Plan of Care Reviewed With: patient    [facility]    [facility] - ICU    RN Progress Note:            Pertinent Assessments:      Please refer to flowsheet rows for full assessment     Chest tubes and pacer wires pulled by [facility] this am without complications.     Pt c/o chest/incisional pain, relieved with tylenol.          Key Events - This Shift:     Back to bed from chair with assist of 2 (RN and PT). Pt with left knee \"bone on bone\" and surgery pending per pt report.           Barriers to Discharge / Downgrade:     Pt downgraded to cardiac tele.    Chelsi, RN covering for this nurse from [phone]. Per report she gave report to RN on P3. Pt trasfered to P3 in recliner chair on their cardiac monitor with NA. All valuables went with pt.      Point of Contact Update: YES-OR-NO: Yes  Name: Omari  Phone Number: on file  Summary of Conversation: Pt transferred to room 330 and events from above.                   "

## 2024-11-22 NOTE — TREATMENT PLAN
RCAT Treatment Plan    Patient Score: 7  Patient Acuity: 4    Clinical Indication for Therapy: prevent atelectasis    Therapy Ordered: FV, IS BID    Assessment Summary: pt s/p CABGx3. He is a former tobacco smoker. CXR 11/20 neg. RR 16, LS clear, NPC, on room air SpO2 94%. IS to 800ml, able to do FV well with good technique and effort. Will reassess daily or as needed.      Yuri Orr, RT  11/22/2024

## 2024-11-22 NOTE — PLAN OF CARE
Pt arrived to P3 room 330 from ICU without complications & was settled. VSS, NSR, pt denies pain. Pt had just finished having a late lunch while in ICU, and reports he has  had several BM's today.  Pt is tolerating up in chair well at this time & is using good sternal precautions with activity using heart pillow. Pt verbalized understanding of using call light for assistance. Pt belongings are close to him. Will report off to next RN

## 2024-11-23 ENCOUNTER — APPOINTMENT (OUTPATIENT)
Dept: RADIOLOGY | Facility: HOSPITAL | Age: 79
End: 2024-11-23
Attending: PHYSICIAN ASSISTANT
Payer: COMMERCIAL

## 2024-11-23 ENCOUNTER — APPOINTMENT (OUTPATIENT)
Dept: OCCUPATIONAL THERAPY | Facility: HOSPITAL | Age: 79
End: 2024-11-23
Attending: INTERNAL MEDICINE
Payer: COMMERCIAL

## 2024-11-23 LAB
ANION GAP SERPL CALCULATED.3IONS-SCNC: 12 MMOL/L (ref 7–15)
BUN SERPL-MCNC: 35.9 MG/DL (ref 8–23)
CA-I BLD-MCNC: 4.4 MG/DL (ref 4.4–5.2)
CA-I BLD-MCNC: 4.4 MG/DL (ref 4.4–5.2)
CA-I BLD-MCNC: 4.5 MG/DL (ref 4.4–5.2)
CALCIUM SERPL-MCNC: 8.2 MG/DL (ref 8.8–10.4)
CHLORIDE SERPL-SCNC: 108 MMOL/L (ref 98–107)
CREAT SERPL-MCNC: 1.55 MG/DL (ref 0.67–1.17)
EGFRCR SERPLBLD CKD-EPI 2021: 45 ML/MIN/1.73M2
ERYTHROCYTE [DISTWIDTH] IN BLOOD BY AUTOMATED COUNT: 16.2 % (ref 10–15)
GLUCOSE BLDC GLUCOMTR-MCNC: 113 MG/DL (ref 70–99)
GLUCOSE BLDC GLUCOMTR-MCNC: 135 MG/DL (ref 70–99)
GLUCOSE BLDC GLUCOMTR-MCNC: 175 MG/DL (ref 70–99)
GLUCOSE BLDC GLUCOMTR-MCNC: 186 MG/DL (ref 70–99)
GLUCOSE SERPL-MCNC: 103 MG/DL (ref 70–99)
HCO3 SERPL-SCNC: 25 MMOL/L (ref 22–29)
HCT VFR BLD AUTO: 35.4 % (ref 40–53)
HGB BLD-MCNC: 10.9 G/DL (ref 13.3–17.7)
HOLD SPECIMEN: NORMAL
MAGNESIUM SERPL-MCNC: 2.1 MG/DL (ref 1.7–2.3)
MCH RBC QN AUTO: 28.5 PG (ref 26.5–33)
MCHC RBC AUTO-ENTMCNC: 30.8 G/DL (ref 31.5–36.5)
MCV RBC AUTO: 92 FL (ref 78–100)
PHOSPHATE SERPL-MCNC: 2.3 MG/DL (ref 2.5–4.5)
PLATELET # BLD AUTO: 306 10E3/UL (ref 150–450)
POTASSIUM SERPL-SCNC: 3.7 MMOL/L (ref 3.4–5.3)
POTASSIUM SERPL-SCNC: 3.7 MMOL/L (ref 3.4–5.3)
RBC # BLD AUTO: 3.83 10E6/UL (ref 4.4–5.9)
SODIUM SERPL-SCNC: 145 MMOL/L (ref 135–145)
WBC # BLD AUTO: 10.7 10E3/UL (ref 4–11)

## 2024-11-23 PROCEDURE — 83735 ASSAY OF MAGNESIUM: CPT | Performed by: PHYSICIAN ASSISTANT

## 2024-11-23 PROCEDURE — 84100 ASSAY OF PHOSPHORUS: CPT | Performed by: PHYSICIAN ASSISTANT

## 2024-11-23 PROCEDURE — 82565 ASSAY OF CREATININE: CPT | Performed by: PHYSICIAN ASSISTANT

## 2024-11-23 PROCEDURE — 250N000013 HC RX MED GY IP 250 OP 250 PS 637: Performed by: PHYSICIAN ASSISTANT

## 2024-11-23 PROCEDURE — 36415 COLL VENOUS BLD VENIPUNCTURE: CPT | Performed by: PHYSICIAN ASSISTANT

## 2024-11-23 PROCEDURE — 36415 COLL VENOUS BLD VENIPUNCTURE: CPT | Performed by: THORACIC SURGERY (CARDIOTHORACIC VASCULAR SURGERY)

## 2024-11-23 PROCEDURE — 80048 BASIC METABOLIC PNL TOTAL CA: CPT | Performed by: THORACIC SURGERY (CARDIOTHORACIC VASCULAR SURGERY)

## 2024-11-23 PROCEDURE — 85018 HEMOGLOBIN: CPT | Performed by: PHYSICIAN ASSISTANT

## 2024-11-23 PROCEDURE — 999N000157 HC STATISTIC RCP TIME EA 10 MIN

## 2024-11-23 PROCEDURE — 82330 ASSAY OF CALCIUM: CPT | Performed by: THORACIC SURGERY (CARDIOTHORACIC VASCULAR SURGERY)

## 2024-11-23 PROCEDURE — 82330 ASSAY OF CALCIUM: CPT | Performed by: PHYSICIAN ASSISTANT

## 2024-11-23 PROCEDURE — 250N000011 HC RX IP 250 OP 636: Performed by: PHYSICIAN ASSISTANT

## 2024-11-23 PROCEDURE — 999N000156 HC STATISTIC RCP CONSULT EA 30 MIN

## 2024-11-23 PROCEDURE — 84132 ASSAY OF SERUM POTASSIUM: CPT | Performed by: THORACIC SURGERY (CARDIOTHORACIC VASCULAR SURGERY)

## 2024-11-23 PROCEDURE — 71046 X-RAY EXAM CHEST 2 VIEWS: CPT

## 2024-11-23 PROCEDURE — 94799 UNLISTED PULMONARY SVC/PX: CPT

## 2024-11-23 PROCEDURE — 250N000011 HC RX IP 250 OP 636: Mod: JZ | Performed by: PHYSICIAN ASSISTANT

## 2024-11-23 PROCEDURE — 80048 BASIC METABOLIC PNL TOTAL CA: CPT | Performed by: PHYSICIAN ASSISTANT

## 2024-11-23 PROCEDURE — 210N000001 HC R&B IMCU HEART CARE

## 2024-11-23 PROCEDURE — 97535 SELF CARE MNGMENT TRAINING: CPT | Mod: GO

## 2024-11-23 PROCEDURE — 250N000013 HC RX MED GY IP 250 OP 250 PS 637: Performed by: THORACIC SURGERY (CARDIOTHORACIC VASCULAR SURGERY)

## 2024-11-23 RX ORDER — FUROSEMIDE 20 MG/1
20 TABLET ORAL DAILY
Status: DISCONTINUED | OUTPATIENT
Start: 2024-11-24 | End: 2024-11-24

## 2024-11-23 RX ORDER — METOPROLOL TARTRATE 25 MG/1
50 TABLET, FILM COATED ORAL 2 TIMES DAILY
Status: DISCONTINUED | OUTPATIENT
Start: 2024-11-23 | End: 2024-11-24

## 2024-11-23 RX ORDER — POTASSIUM CHLORIDE 1500 MG/1
20 TABLET, EXTENDED RELEASE ORAL ONCE
Status: COMPLETED | OUTPATIENT
Start: 2024-11-23 | End: 2024-11-23

## 2024-11-23 RX ADMIN — URSODIOL 300 MG: 300 CAPSULE ORAL at 20:03

## 2024-11-23 RX ADMIN — LIDOCAINE 2 PATCH: 4 PATCH TOPICAL at 17:18

## 2024-11-23 RX ADMIN — URSODIOL 300 MG: 300 CAPSULE ORAL at 08:13

## 2024-11-23 RX ADMIN — FUROSEMIDE 20 MG: 10 INJECTION, SOLUTION INTRAMUSCULAR; INTRAVENOUS at 08:10

## 2024-11-23 RX ADMIN — INSULIN ASPART 2 UNITS: 100 INJECTION, SOLUTION INTRAVENOUS; SUBCUTANEOUS at 12:16

## 2024-11-23 RX ADMIN — METOPROLOL TARTRATE 25 MG: 25 TABLET, FILM COATED ORAL at 08:08

## 2024-11-23 RX ADMIN — CALCIUM GLUCONATE 1 G: 20 INJECTION, SOLUTION INTRAVENOUS at 18:35

## 2024-11-23 RX ADMIN — CALCIUM GLUCONATE 1 G: 20 INJECTION, SOLUTION INTRAVENOUS at 06:11

## 2024-11-23 RX ADMIN — HEPARIN SODIUM 5000 UNITS: 5000 INJECTION, SOLUTION INTRAVENOUS; SUBCUTANEOUS at 20:03

## 2024-11-23 RX ADMIN — ALLOPURINOL 100 MG: 100 TABLET ORAL at 08:09

## 2024-11-23 RX ADMIN — HEPARIN SODIUM 5000 UNITS: 5000 INJECTION, SOLUTION INTRAVENOUS; SUBCUTANEOUS at 12:16

## 2024-11-23 RX ADMIN — FINASTERIDE 5 MG: 5 TABLET, FILM COATED ORAL at 08:07

## 2024-11-23 RX ADMIN — HEPARIN SODIUM 5000 UNITS: 5000 INJECTION, SOLUTION INTRAVENOUS; SUBCUTANEOUS at 03:41

## 2024-11-23 RX ADMIN — SODIUM BICARBONATE 650 MG TABLET 1950 MG: at 20:03

## 2024-11-23 RX ADMIN — FERROUS GLUCONATE 324 MG: 324 TABLET ORAL at 08:11

## 2024-11-23 RX ADMIN — POTASSIUM & SODIUM PHOSPHATES POWDER PACK 280-160-250 MG 1 PACKET: 280-160-250 PACK at 14:08

## 2024-11-23 RX ADMIN — POTASSIUM CHLORIDE 20 MEQ: 1500 TABLET, EXTENDED RELEASE ORAL at 06:09

## 2024-11-23 RX ADMIN — SODIUM BICARBONATE 650 MG TABLET 1950 MG: at 14:08

## 2024-11-23 RX ADMIN — METOPROLOL TARTRATE 12.5 MG: 25 TABLET, FILM COATED ORAL at 03:41

## 2024-11-23 RX ADMIN — METOPROLOL TARTRATE 50 MG: 25 TABLET, FILM COATED ORAL at 20:03

## 2024-11-23 RX ADMIN — PANTOPRAZOLE SODIUM 40 MG: 40 TABLET, DELAYED RELEASE ORAL at 06:37

## 2024-11-23 RX ADMIN — INSULIN GLARGINE 10 UNITS: 100 INJECTION, SOLUTION SUBCUTANEOUS at 08:12

## 2024-11-23 RX ADMIN — INSULIN GLARGINE 10 UNITS: 100 INJECTION, SOLUTION SUBCUTANEOUS at 20:57

## 2024-11-23 RX ADMIN — ASPIRIN 81 MG CHEWABLE TABLET 162 MG: 81 TABLET CHEWABLE at 08:09

## 2024-11-23 RX ADMIN — POTASSIUM & SODIUM PHOSPHATES POWDER PACK 280-160-250 MG 1 PACKET: 280-160-250 PACK at 11:04

## 2024-11-23 RX ADMIN — POTASSIUM & SODIUM PHOSPHATES POWDER PACK 280-160-250 MG 1 PACKET: 280-160-250 PACK at 06:09

## 2024-11-23 RX ADMIN — ATORVASTATIN CALCIUM 40 MG: 40 TABLET, FILM COATED ORAL at 20:03

## 2024-11-23 RX ADMIN — SODIUM BICARBONATE 650 MG TABLET 1950 MG: at 08:08

## 2024-11-23 NOTE — PLAN OF CARE
sc  Patient Name: Lance Ibrahim   MRN: [id number]   Date of Admission: 11/14/2024    Procedure: Procedure(s):  CORONARY ARTERY BYPASS GRAFT TIMES THREE, WITH LEFT INTERNAL MAMARARY ARTERY HARVEST, LEFT ENDOSCOPIC VESSEL PROCUREMENT, EPIAORTIC ULTRASOUND  ECHOCARDIOGRAM, TRANSESOPHAGEAL, INTRAOPERATIVE    Post Op day #:5    Subjective (Patient focus/Primary Problem for shift): rest/sleep          Pain Goal 0 Pain Rating 0           Pain Medication/ Regime effective to reduce patient pain None    Objective (Physical assessment):           Rhythm: normal sinus rhythm first Degree AVB, BBB            Bowel Activity: yes if Yes indicate when: 11/23/24          Bowel Medications: No, held during evening shift for stools during day/tomasa time            Incision: healing well          Incentive Spirometry Q 1-2 hour when awake:  not applicable Volume: n/a          Epicardial Pacing Wires:  no            Patient Activity:           Up to chair for meals: not applicable          Ambulation with RN x2 (Not including CR): not applicable           Shower Daily No          Nasal  Nozin BID AM/PM x 10 days: yes              Chest Tubes   Pleural: not applicable Draining: not applicable               Suction: not applicable              Mediastinal: not applicable Draining: not applicable               Suction: not applicable   Dressing Change Daily:no If No, why? To be completed on day shift                     Urinary Catheter: no           Preventative WOC consult (need MD order): no       Assessment (Nursing primary shift focus): Patient denied pain, SOB, or nausea overnight. Repositioning as patient allowed. Incisions healing well, no drainage noted.  Cares grouped to allow rest.    Plan (Patient Care Plan/focus):   IS while awake  Shower today  Incision care  Ambulation/increase activity  Up in chair for meals      Bigg Heck RN   11/23/2024   5:15 AM

## 2024-11-23 NOTE — PROGRESS NOTES
CVTS Daily Progress Note   POD#5 s/p CABG x3 (LIMA>LAD, rSVG>RI, rSVG>distal OM) , LLE EVH w/ IABP in place  Attending: Nathan  LOS: 9    SUBJECTIVE/INTERVAL EVENTS:    No acute events overnight.. Patient progressing well. Maintaining oxygen saturations on RA. Normotensive. Some ambulation with PT. Pain well controlled.  + BM / + flatus. Tolerating diet without nausea. UOP adequate. Chest tube output appropriate. Hgb stable. Answered all pts questions today. Kash CP, SOB, abd pain or calf pain.    OBJECTIVE:  Temp:  [98.1  F (36.7  C)-98.6  F (37  C)] 98.2  F (36.8  C)  Pulse:  [78-93] 92  Resp:  [18-30] 22  BP: ()/(56-85) 135/73  SpO2:  [69 %-95 %] 95 %  Vitals:    11/18/24 0500 11/19/24 0200 11/21/24 0040 11/22/24 0600   Weight: 92.4 kg (203 lb 11.2 oz) 93.4 kg (205 lb 14.6 oz) 91.6 kg (201 lb 14.4 oz) 87.5 kg (192 lb 14.4 oz)    11/23/24 0635   Weight: 92.6 kg (204 lb 2.3 oz)       Clinically Significant Risk Factors        # Hypokalemia: Lowest K = 3.3 mmol/L in last 2 days, will replace as needed        # Hypoalbuminemia: Lowest albumin = 2.6 g/dL at 11/16/2024  4:06 AM, will monitor as appropriate     # Hypertension: Noted on problem list    # Acute heart failure with reduced ejection fraction: last echo with EF <40% and receiving IV diuretics    # Acute Hypercapnic Respiratory Failure: based on venous blood gas results.  Continue supplemental oxygen and ventilatory support as indicated.        # DMII: A1C = 9.1 % (Ref range: <5.7 %) within past 6 months   # Overweight: Estimated body mass index is 27.69 kg/m  as calculated from the following:    Height as of this encounter: 1.829 m (6').    Weight as of this encounter: 92.6 kg (204 lb 2.3 oz).        # Financial/Environmental Concerns:     # History of CABG: noted on surgical history               Current Medications:    Scheduled Meds:  Current Facility-Administered Medications   Medication Dose Route Frequency Provider Last Rate Last Admin     allopurinol (ZYLOPRIM) tablet 100 mg  100 mg Oral Daily McCrone Annie, PA-C   100 mg at 11/23/24 0809    aspirin (ASA) chewable tablet 162 mg  162 mg Oral or NG Tube Daily McCrone, Annie, PA-C   162 mg at 11/23/24 0809    Or    aspirin (ASA) Suppository 300 mg  300 mg Rectal Daily McCrone, Annie, PA-C   300 mg at 11/19/24 0917    atorvastatin (LIPITOR) tablet 40 mg  40 mg Oral QPM McCrone Annie, PA-C   40 mg at 11/22/24 2035    ferrous gluconate (FERGON) tablet 324 mg  324 mg Oral Daily McCrone, Annie, PA-C   324 mg at 11/23/24 0811    finasteride (PROSCAR) tablet 5 mg  5 mg Oral Daily McCrone, Annie, PA-C   5 mg at 11/23/24 0807    furosemide (LASIX) injection 20 mg  20 mg Intravenous BID Annie Sarabia, PA-C   20 mg at 11/23/24 0810    [Held by provider] glipiZIDE (GLUCOTROL XL) 24 hr tablet 2.5 mg  2.5 mg Oral Daily Shania Barr PA-C        heparin ANTICOAGULANT injection 5,000 Units  5,000 Units Subcutaneous Q8H Annie Sarabia, PA-C   5,000 Units at 11/23/24 0341    insulin aspart (NovoLOG) injection (RAPID ACTING)  1-10 Units Subcutaneous TID AC Annie Sarabia PA-C   3 Units at 11/22/24 1638    insulin aspart (NovoLOG) injection (RAPID ACTING)  1-7 Units Subcutaneous At Bedtime Annie Sarabia, PA-C        insulin glargine (LANTUS PEN) injection 10 Units  10 Units Subcutaneous BID Jocelinrone Annie, PA-C   10 Units at 11/23/24 0812    Lidocaine (LIDOCARE) 4 % Patch 1-2 patch  1-2 patch Transdermal Q24H Annie Sarabia, PA-C   2 patch at 11/22/24 1645    metoprolol tartrate (LOPRESSOR) half-tab 12.5 mg  12.5 mg Oral TID JocelinronAnnie nails, PA-C   12.5 mg at 11/23/24 0341    metoprolol tartrate (LOPRESSOR) tablet 25 mg  25 mg Oral BID McCrone, Annie, PA-C   25 mg at 11/23/24 0808    pantoprazole (PROTONIX) 2 mg/mL suspension 40 mg  40 mg Oral or NG Tube QAM AC McCrone, Annie, PA-C        Or    pantoprazole (PROTONIX) EC tablet 40 mg  40 mg Oral QAM AC McCrone, Annie, PA-C   40 mg at 11/23/24 0637    polyethylene glycol  (MIRALAX) Packet 17 g  17 g Oral Daily McCrone, Annie, PA-C   17 g at 11/21/24 0839    potassium & sodium phosphates (NEUTRA-PHOS) Packet 1 packet  1 packet Oral or Feeding Tube Q4H Tsering Evans MD   1 packet at 11/23/24 0609    senna-docusate (SENOKOT-S/PERICOLACE) 8.6-50 MG per tablet 1 tablet  1 tablet Oral BID McCrone, Annie, PA-C   1 tablet at 11/21/24 0839    sodium bicarbonate tablet 1,950 mg  1,950 mg Oral TID McCrone, Annie, PA-C   1,950 mg at 11/23/24 0808    ursodiol (ACTIGALL) capsule 300 mg  300 mg Oral BID McCrone, Annie, PA-C   300 mg at 11/23/24 0813     Continuous Infusions:  Current Facility-Administered Medications   Medication Dose Route Frequency Provider Last Rate Last Admin    Med Instruction - Transition from IV Insulin Infusion to Sub-Q Insulin   Does not apply Continuous PRN McCrone, Annie, PA-C         PRN Meds:.  Current Facility-Administered Medications   Medication Dose Route Frequency Provider Last Rate Last Admin    acetaminophen (TYLENOL) tablet 650 mg  650 mg Oral Q4H PRN McCrone, Annie, PA-C   650 mg at 11/22/24 0811    bisacodyl (DULCOLAX) suppository 10 mg  10 mg Rectal Daily PRN McCrone, Annie, PA-C        calcium carbonate (TUMS) chewable tablet 1,000 mg  1,000 mg Oral TID PRN McCrone, Annie, PA-C        calcium gluconate 1 g in 50 mL in sodium chloride intermittent infusion  1 g Intravenous Once PRN McCrone, Annie, PA-C   1 g at 11/23/24 0611    calcium gluconate 2 g in  mL intermittent infusion  2 g Intravenous Once PRN McCrone, Annie, PA-C        calcium gluconate 3 g in sodium chloride 0.9 % 100 mL intermittent infusion  3 g Intravenous Once PRN McCrone, Annie, PA-C        glucose gel 15-30 g  15-30 g Oral Q15 Min PRN McCrone, Annie, PA-C        Or    dextrose 50 % injection 25-50 mL  25-50 mL Intravenous Q15 Min PRN McCrone, Annie, PA-C        Or    glucagon injection 1 mg  1 mg Subcutaneous Q15 Min PRN Annie Sarabia PA-C        gabapentin (NEURONTIN) capsule 300 mg  300  mg Oral TID PRN McCrone, Annie, PA-C   300 mg at 11/21/24 2155    hydrALAZINE (APRESOLINE) injection 10 mg  10 mg Intravenous Q30 Min PRN McCrone, Annie, PA-C   10 mg at 11/21/24 0743    lactated ringers BOLUS 250 mL  250 mL Intravenous Q15 Min PRN McCrone, Annie, PA-C        LubriFresh P.M. OINT 1 g  1 Application Ophthalmic BID PRN McCrone, Annie, PA-C   1 g at 11/20/24 0119    magnesium hydroxide (MILK OF MAGNESIA) suspension 30 mL  30 mL Oral Daily PRN McCrone, Annie, PA-C        Med Instruction - Transition from IV Insulin Infusion to Sub-Q Insulin   Does not apply Continuous PRN McCrone, Annie, PA-C        melatonin tablet 5 mg  5 mg Oral At Bedtime PRN McCrone, Annie, PA-C   5 mg at 11/21/24 2155    methocarbamol (ROBAXIN) tablet 500 mg  500 mg Oral 4x Daily PRN McCrone, Annie, PA-C   500 mg at 11/20/24 2223    naloxone (NARCAN) injection 0.2 mg  0.2 mg Intravenous Q2 Min PRN McCrone, Annie, PA-C        Or    naloxone (NARCAN) injection 0.4 mg  0.4 mg Intravenous Q2 Min PRN McCrone, Annie, PA-C        Or    naloxone (NARCAN) injection 0.2 mg  0.2 mg Intramuscular Q2 Min PRN McCrone, Annie, PA-C        Or    naloxone (NARCAN) injection 0.4 mg  0.4 mg Intramuscular Q2 Min PRN McCrone, Annie, PA-C        ondansetron (ZOFRAN ODT) ODT tab 4 mg  4 mg Oral Q6H PRN McCrone, Annie, PA-C        Or    ondansetron (ZOFRAN) injection 4 mg  4 mg Intravenous Q6H PRN McCrone, Annei, PA-C        oxyCODONE (ROXICODONE) tablet 5 mg  5 mg Oral Q4H PRN McCrone, Annie, PA-C   5 mg at 11/21/24 1629    Or    oxyCODONE (ROXICODONE) tablet 10 mg  10 mg Oral Q4H PRN McCrone, Annie, PA-C        prochlorperazine (COMPAZINE) injection 5 mg  5 mg Intravenous Q6H PRN Annie Sarabia PA-C        Or    prochlorperazine (COMPAZINE) tablet 5 mg  5 mg Oral Q6H PRN Annie Sarabia PA-C           Cardiographics:    Telemetry monitoring demonstrates sinus rhythm w/ rates in the 90s per my personal review.    Imaging:  Results for orders placed or performed during  the hospital encounter of 11/14/24   US Carotid Bilateral    Impression    IMPRESSION:  1.  Mild plaque formation, velocities consistent with less than 50% stenosis in the right internal carotid artery.  2.  Mild plaque formation, velocities consistent with less than 50% stenosis in the left internal carotid artery.  3.  Flow within the vertebral arteries is antegrade.   CT Chest w/o Contrast    Impression    IMPRESSION:   1.  Heavy atherosclerotic calcification aortic root and proximal ascending aorta.  2.  Moderate right and small left pleural effusions with associated compressive atelectasis/consolidation in the lung bases. Consolidative opacity left lower lobe somewhat rounded, likely representing atelectasis.  3.  Mildly enlarged mediastinal lymph nodes likely reactive.  4.  Otherwise stable exam.     PET Cardiac Viability    Impression    IMPRESSION:    Findings suggesting stunned, but viable left ventricular myocardium.   XR Chest Port 1 View    Impression    IMPRESSION: Cardiomegaly. Marker for balloon pump in the aortic arch. This could be pulled back approximately 2 cm. Findings discussed with ( nurse Key    ) clinical service at approximately 0605 hours. Cardiomegaly with mild-to-moderate pulmonary   vascular congestion. Neurostimulator leads in stable position. Small right pleural effusion suspected. The costophrenic angle has been omitted on this exam. Patchy area of atelectasis or infiltrate in the left lung base.     XR Chest Port 1 View    Impression    IMPRESSION: Radiopaque marker of the intra-aortic balloon pump at the level of the mid thoracic arch, unchanged. Mild cardiac enlargement and venous hypertension, unchanged. No significant pleural effusion on either side. Postoperative changes left   shoulder. Degenerative changes both shoulders and the spine, narrowing worse involving the left glenohumeral joint with remodeling of the left humeral head. Electrodes overlying the lower thoracic spine.  Monitoring leads overlying the chest.       Labs, personally reviewed.  Hemoglobin   Date Value Ref Range Status   11/21/2024 11.6 (L) 13.3 - 17.7 g/dL Final   11/20/2024 10.4 (L) 13.3 - 17.7 g/dL Final   11/20/2024 10.5 (L) 13.3 - 17.7 g/dL Final   04/29/2021 12.2 (L) 13.3 - 17.7 g/dL Final   07/28/2020 10.7 (L) 13.3 - 17.7 g/dL Final   07/27/2020 11.7 (L) 13.3 - 17.7 g/dL Final     WBC   Date Value Ref Range Status   04/29/2021 8.5 4.0 - 11.0 10e9/L Final   07/28/2020 7.4 4.0 - 11.0 10e9/L Final   07/27/2020 10.4 4.0 - 11.0 10e9/L Final     WBC Count   Date Value Ref Range Status   11/21/2024 13.4 (H) 4.0 - 11.0 10e3/uL Final   11/20/2024 11.8 (H) 4.0 - 11.0 10e3/uL Final   11/19/2024 11.6 (H) 4.0 - 11.0 10e3/uL Final     Platelet Count   Date Value Ref Range Status   11/22/2024 287 150 - 450 10e3/uL Final   11/21/2024 284 150 - 450 10e3/uL Final   11/20/2024 239 150 - 450 10e3/uL Final   04/29/2021 250 150 - 450 10e9/L Final   07/28/2020 219 150 - 450 10e9/L Final   07/27/2020 247 150 - 450 10e9/L Final     Creatinine   Date Value Ref Range Status   11/21/2024 1.57 (H) 0.67 - 1.17 mg/dL Final   11/20/2024 1.49 (H) 0.67 - 1.17 mg/dL Final   11/19/2024 1.42 (H) 0.67 - 1.17 mg/dL Final   04/29/2021 1.19 0.66 - 1.25 mg/dL Final   07/28/2020 1.19 0.66 - 1.25 mg/dL Final   07/27/2020 1.30 (H) 0.66 - 1.25 mg/dL Final     Potassium   Date Value Ref Range Status   11/23/2024 3.7 3.4 - 5.3 mmol/L Final   11/22/2024 3.6 3.4 - 5.3 mmol/L Final   11/22/2024 3.3 (L) 3.4 - 5.3 mmol/L Final   08/27/2022 4.6 3.5 - 5.0 mmol/L Final   08/26/2022 4.8 3.5 - 5.0 mmol/L Final   08/25/2022 5.2 (H) 3.5 - 5.0 mmol/L Final   04/29/2021 4.7 3.4 - 5.3 mmol/L Final   07/28/2020 4.5 3.4 - 5.3 mmol/L Final   07/27/2020 3.9 3.4 - 5.3 mmol/L Final     Potassium POCT   Date Value Ref Range Status   11/18/2024 5.1 3.4 - 5.3 mmol/L Final   11/18/2024 5.3 3.4 - 5.3 mmol/L Final   11/18/2024 5.2 3.4 - 5.3 mmol/L Final     Magnesium   Date Value Ref  Range Status   11/23/2024 2.1 1.7 - 2.3 mg/dL Final   11/22/2024 2.0 1.7 - 2.3 mg/dL Final   11/21/2024 2.0 1.7 - 2.3 mg/dL Final   01/24/2020 1.9 1.6 - 2.3 mg/dL Final   10/20/2018 2.0 1.6 - 2.3 mg/dL Final   05/01/2018 1.8 1.6 - 2.3 mg/dL Final          I/O:  I/O last 3 completed shifts:  In: 1417 [P.O.:1417]  Out: 1425 [Urine:1425]       Physical Exam:    General: Patient seen up to chair. NAD  HEENT: DONALDO, no sclera icterus, moist mucosa  CV: RRR on monitor. 2+ peripheral pulses in all extremities. Mild edema.   Pulm: Non-labored effort on RA. Incision C/D/I.  Abd: Soft, NT, ND  : Voiding  Ext: Mild pedal edema, SCDs in place, warm, distal pulses intact. IABP site C/D/I  Neuro: CNs grossly intact      ASSESSMENT/PLAN:    Lance Ibrahim is a 79 year old male with a history of CAD w/ known LM stenosis, CKD1, DM2, chronic anemia nonischemic cardiomyopathy (thought to be d/t HTN) w/ chronic HFpEF, h/o CVA (bilateral PCA emboli), and spinal stenosis w/ spinal cord stimulator in place who is s/p CABG x3 .    Principal Problem:    NSTEMI (non-ST elevated myocardial infarction) (H)  Active Problems:    ST elevation MI (STEMI) (H)        NEURO:   - Scheduled Tylenol/lidocaine patches and PRN Tylenol/oxycodone for pain    CV:   - Pre-op EF 25-30%  - IABP removed POD#1  - Normotensive   - Metoprolol 50 mg PO BID with parameters  -  mg  -  Atorvastatin 40mg daily  - Chest tubes/TPW removed without immediate complications  - New Baseline Echo before discharge - May need LifeVest  - Consider HF consult    PULM:   - Extubated POD#2    - Maintaining oxygen saturations on RA  - Encourage pulmonary toilet    FEN/GI:  - Diet: Cardiac, 60g CHO per meal  - Continue electrolyte replacement protocol  - Bowel regimen, +BM 11/23    RENAL:  - adequate UOP/hr. Continue to monitor closely.  - Cr 1.57 (baseline ~ 1.3-1.4)  - Barnes removed POD#3  - Diuresis with Lasix 20 mg po daily    HEME:  - Acute blood loss anemia post-op.   -  Hgb 11.6, no active bleeding concerns. Hep SQ, ASA  - INR 1.63 on 11/19, plan for 1u FFP  - POD#0 got 3u PRBCs and half dose factor VII    ID:  - Armida op ppx complete, afebrile. No concerns for infection    ENDO:   - HbA1c 9.1%  - BG goal < 180 to promote optimal healing  - PTA on glipizide 2.5 mg qday, lantus 10u BID - holding PO DM2 med until POD#4 pending renal function  - Transition to sliding scale insulin    PPx:   - DVT: SCDs, SQ heparin TID, ambulation   - GI: Protonix 40mg PO daily    DISPO:   - General telemetry unit  - PT/OT recs at discharge: TCU  - Medically Ready for Discharge: 1-2 days      Patient discussed with Dr. Evans.      Orquidea Mendez PA-C   Cardiothoracic Surgery   November 23, 2024 at 8:27 AM  Please reach me on Dormzy or secure chat

## 2024-11-23 NOTE — TREATMENT PLAN
"/73 (BP Location: Left arm)   Pulse 92   Temp 98.2  F (36.8  C) (Oral)   Resp 22   Ht 1.829 m (6')   Wt 92.6 kg (204 lb 2.3 oz)   SpO2 95%   BMI 27.69 kg/m          Allergies   Allergen Reactions    Ancef [Cefazolin] Rash    Cephalexin Itching and Rash     Allergic reaction required hospitalization 4/2024    Penicillins Anaphylaxis    Sulfa Antibiotics      \"itchy rash, swelling of face and hands\"          Intake/Output Summary (Last 24 hours) at 11/23/2024 1042  Last data filed at 11/23/2024 0815  Gross per 24 hour   Intake 1172 ml   Output 1400 ml   Net -228 ml      RCAT Treatment Plan    Patient Score: 6  Patient Acuity: 4    Clinical Indication for Therapy: CVTS    Therapy Ordered: IS/Aerbobika BID.     Assessment Summary: Good voicing. Pt alert oriented. NPC. Pt on RA. No accessory muscle use. No nasal flaring. No retractions. Pt speaks in complete sentences. Pt denies SOB. Good aeration. Pt reports using IS/Aerobika QID. Pt has no questions or concerns with use of device. Will adjust therapy as indicated. Breath sonds decreased.     Jagjit Polanco, RT  11/23/2024   "

## 2024-11-23 NOTE — PROGRESS NOTES
..sc  Patient Name: Lance Ibrahim   MRN: [id number]   Date of Admission: 11/14/2024    Procedure: Procedure(s):  CORONARY ARTERY BYPASS GRAFT TIMES THREE, WITH LEFT INTERNAL MAMARARY ARTERY HARVEST, LEFT ENDOSCOPIC VESSEL PROCUREMENT, EPIAORTIC ULTRASOUND  ECHOCARDIOGRAM, TRANSESOPHAGEAL, INTRAOPERATIVE    Post Op day #:05    Subjective (Patient focus/Primary Problem for shift): Activity level and Tolerance And IS use           Pain Goal0 Pain Rating0/10           Pain Medication/ Regime effective to reduce patient pain Lidocaine patch     Objective (Physical assessment):           Rhythm: normal sinus rhythm            Bowel Activity: yes if Yes indicate when: Today          Bowel Medications: not applicable            Incision: healing well          Incentive Spirometry Q 1-2 hour when awake:  yes Volume: 1000          Epicardial Pacing Wires:  not applicable            Patient Activity:           Up to chair for meals: yes          Ambulation with RN x2 (Not including CR): no           Shower Daily {YES/NO/NA:[id number]          Nasal  Nozin BID AM/PM x 10 days: yes              Chest Tubes   Pleural: not applicable Draining: not applicable               Suction: not applicable              Mediastinal: not applicable Draining: not applicable               Suction: not applicable   Dressing Change Daily:not applicable If No, why?N/A                     Urinary Catheter: not applicable.frequent urination bladder scan done and it showed 108 ml post void residual'           Preventative WOC consult (need MD order): not applicable       Assessment (Nursing primary shift focus): Up to chair with assist of 2 with a transfer belt unsteady when up     Plan (Patient Care Plan/focus): Up to chair with meals continue to encourage IS use .      Concepcion Herrera RN   11/23/2024   5:58 PM

## 2024-11-24 ENCOUNTER — APPOINTMENT (OUTPATIENT)
Dept: CARDIOLOGY | Facility: HOSPITAL | Age: 79
End: 2024-11-24
Attending: PHYSICIAN ASSISTANT
Payer: COMMERCIAL

## 2024-11-24 LAB
GLUCOSE BLDC GLUCOMTR-MCNC: 144 MG/DL (ref 70–99)
GLUCOSE BLDC GLUCOMTR-MCNC: 153 MG/DL (ref 70–99)
GLUCOSE BLDC GLUCOMTR-MCNC: 165 MG/DL (ref 70–99)
GLUCOSE BLDC GLUCOMTR-MCNC: 194 MG/DL (ref 70–99)
HOLD SPECIMEN: NORMAL
LVEF ECHO: NORMAL
MAGNESIUM SERPL-MCNC: 2.1 MG/DL (ref 1.7–2.3)
POTASSIUM SERPL-SCNC: 3.8 MMOL/L (ref 3.4–5.3)

## 2024-11-24 PROCEDURE — 999N000156 HC STATISTIC RCP CONSULT EA 30 MIN

## 2024-11-24 PROCEDURE — 250N000011 HC RX IP 250 OP 636: Performed by: PHYSICIAN ASSISTANT

## 2024-11-24 PROCEDURE — 93308 TTE F-UP OR LMTD: CPT | Mod: 26 | Performed by: INTERNAL MEDICINE

## 2024-11-24 PROCEDURE — 94799 UNLISTED PULMONARY SVC/PX: CPT

## 2024-11-24 PROCEDURE — 250N000013 HC RX MED GY IP 250 OP 250 PS 637: Performed by: THORACIC SURGERY (CARDIOTHORACIC VASCULAR SURGERY)

## 2024-11-24 PROCEDURE — 84132 ASSAY OF SERUM POTASSIUM: CPT | Performed by: THORACIC SURGERY (CARDIOTHORACIC VASCULAR SURGERY)

## 2024-11-24 PROCEDURE — 250N000013 HC RX MED GY IP 250 OP 250 PS 637: Performed by: PHYSICIAN ASSISTANT

## 2024-11-24 PROCEDURE — 93321 DOPPLER ECHO F-UP/LMTD STD: CPT | Mod: 26 | Performed by: INTERNAL MEDICINE

## 2024-11-24 PROCEDURE — 93325 DOPPLER ECHO COLOR FLOW MAPG: CPT | Mod: 26 | Performed by: INTERNAL MEDICINE

## 2024-11-24 PROCEDURE — 255N000002 HC RX 255 OP 636: Performed by: PHYSICIAN ASSISTANT

## 2024-11-24 PROCEDURE — 36415 COLL VENOUS BLD VENIPUNCTURE: CPT | Performed by: THORACIC SURGERY (CARDIOTHORACIC VASCULAR SURGERY)

## 2024-11-24 PROCEDURE — 210N000001 HC R&B IMCU HEART CARE

## 2024-11-24 PROCEDURE — 83735 ASSAY OF MAGNESIUM: CPT | Performed by: THORACIC SURGERY (CARDIOTHORACIC VASCULAR SURGERY)

## 2024-11-24 PROCEDURE — 999N000157 HC STATISTIC RCP TIME EA 10 MIN

## 2024-11-24 PROCEDURE — 93325 DOPPLER ECHO COLOR FLOW MAPG: CPT

## 2024-11-24 RX ORDER — METOPROLOL TARTRATE 25 MG/1
75 TABLET, FILM COATED ORAL 2 TIMES DAILY
Status: DISCONTINUED | OUTPATIENT
Start: 2024-11-24 | End: 2024-11-25

## 2024-11-24 RX ORDER — POTASSIUM CHLORIDE 1500 MG/1
20 TABLET, EXTENDED RELEASE ORAL ONCE
Status: COMPLETED | OUTPATIENT
Start: 2024-11-24 | End: 2024-11-24

## 2024-11-24 RX ORDER — BUMETANIDE 1 MG/1
1 TABLET ORAL DAILY
Status: DISCONTINUED | OUTPATIENT
Start: 2024-11-24 | End: 2024-11-26 | Stop reason: HOSPADM

## 2024-11-24 RX ORDER — METOPROLOL TARTRATE 25 MG/1
25 TABLET, FILM COATED ORAL ONCE
Status: COMPLETED | OUTPATIENT
Start: 2024-11-24 | End: 2024-11-24

## 2024-11-24 RX ADMIN — SODIUM BICARBONATE 650 MG TABLET 1950 MG: at 20:05

## 2024-11-24 RX ADMIN — URSODIOL 300 MG: 300 CAPSULE ORAL at 20:05

## 2024-11-24 RX ADMIN — METOPROLOL TARTRATE 25 MG: 25 TABLET, FILM COATED ORAL at 10:02

## 2024-11-24 RX ADMIN — FERROUS GLUCONATE 324 MG: 324 TABLET ORAL at 08:31

## 2024-11-24 RX ADMIN — INSULIN ASPART 1 UNITS: 100 INJECTION, SOLUTION INTRAVENOUS; SUBCUTANEOUS at 08:35

## 2024-11-24 RX ADMIN — URSODIOL 300 MG: 300 CAPSULE ORAL at 08:31

## 2024-11-24 RX ADMIN — PERFLUTREN 3 ML: 6.52 INJECTION, SUSPENSION INTRAVENOUS at 11:45

## 2024-11-24 RX ADMIN — PANTOPRAZOLE SODIUM 40 MG: 40 TABLET, DELAYED RELEASE ORAL at 06:34

## 2024-11-24 RX ADMIN — INSULIN ASPART 3 UNITS: 100 INJECTION, SOLUTION INTRAVENOUS; SUBCUTANEOUS at 16:38

## 2024-11-24 RX ADMIN — INSULIN GLARGINE 10 UNITS: 100 INJECTION, SOLUTION SUBCUTANEOUS at 08:35

## 2024-11-24 RX ADMIN — POTASSIUM CHLORIDE 20 MEQ: 1500 TABLET, EXTENDED RELEASE ORAL at 08:31

## 2024-11-24 RX ADMIN — ALLOPURINOL 100 MG: 100 TABLET ORAL at 08:31

## 2024-11-24 RX ADMIN — FINASTERIDE 5 MG: 5 TABLET, FILM COATED ORAL at 08:31

## 2024-11-24 RX ADMIN — INSULIN ASPART 1 UNITS: 100 INJECTION, SOLUTION INTRAVENOUS; SUBCUTANEOUS at 12:35

## 2024-11-24 RX ADMIN — HEPARIN SODIUM 5000 UNITS: 5000 INJECTION, SOLUTION INTRAVENOUS; SUBCUTANEOUS at 04:09

## 2024-11-24 RX ADMIN — METOPROLOL TARTRATE 50 MG: 25 TABLET, FILM COATED ORAL at 08:31

## 2024-11-24 RX ADMIN — INSULIN GLARGINE 10 UNITS: 100 INJECTION, SOLUTION SUBCUTANEOUS at 21:05

## 2024-11-24 RX ADMIN — HEPARIN SODIUM 5000 UNITS: 5000 INJECTION, SOLUTION INTRAVENOUS; SUBCUTANEOUS at 12:36

## 2024-11-24 RX ADMIN — BUMETANIDE 1 MG: 1 TABLET ORAL at 16:30

## 2024-11-24 RX ADMIN — SODIUM BICARBONATE 650 MG TABLET 1950 MG: at 15:14

## 2024-11-24 RX ADMIN — SODIUM BICARBONATE 650 MG TABLET 1950 MG: at 08:31

## 2024-11-24 RX ADMIN — ASPIRIN 81 MG CHEWABLE TABLET 162 MG: 81 TABLET CHEWABLE at 08:31

## 2024-11-24 RX ADMIN — LIDOCAINE 2 PATCH: 4 PATCH TOPICAL at 16:30

## 2024-11-24 RX ADMIN — FUROSEMIDE 20 MG: 20 TABLET ORAL at 08:31

## 2024-11-24 RX ADMIN — METOPROLOL TARTRATE 75 MG: 25 TABLET, FILM COATED ORAL at 20:05

## 2024-11-24 RX ADMIN — HEPARIN SODIUM 5000 UNITS: 5000 INJECTION, SOLUTION INTRAVENOUS; SUBCUTANEOUS at 20:05

## 2024-11-24 RX ADMIN — ATORVASTATIN CALCIUM 40 MG: 40 TABLET, FILM COATED ORAL at 20:05

## 2024-11-24 NOTE — PLAN OF CARE
"  Patient Name: Lance Ibrahim   MRN: [id number]   Date of Admission: 11/14/2024    Procedure: Procedure(s):  CORONARY ARTERY BYPASS GRAFT TIMES THREE, WITH LEFT INTERNAL MAMARARY ARTERY HARVEST, LEFT ENDOSCOPIC VESSEL PROCUREMENT, EPIAORTIC ULTRASOUND  ECHOCARDIOGRAM, TRANSESOPHAGEAL, INTRAOPERATIVE    Post Op day #:6    Subjective (Patient focus/Primary Problem for shift):           Pain Goal 0 Pain Rating 0           Pain Medication/ Regime effective to reduce patient pain yes    Objective (Physical assessment):           Rhythm: normal sinus rhythm 1degree AV and BBB            Bowel Activity: yes if Yes indicate when: 11/24          Bowel Medications: no- frequent loose stool, holding bowel meds            Incision: healing well          Incentive Spirometry Q 1-2 hour when awake:  yes Volume: 1500          Epicardial Pacing Wires:  no            Patient Activity:           Up to chair for meals: yes          Ambulation with RN x2 (Not including CR): no- per patinet, unabel to ambualte d/t \"bone on bone in his knee\"          Shower Daily yes          Nasal  Nozin BID AM/PM x 10 days: yes              Chest Tubes   Pleural: no Draining: not applicable               Suction: not applicable              Mediastinal: no Draining: not applicable               Suction: not applicable   Dressing Change Daily:not applicable If No, why?                     Urinary Catheter: no           Preventative WOC consult (need MD order): no       Assessment (Nursing primary shift focus): Continues to have loose bowel movements. Able to stand for a little bit this morning.     Plan (Patient Care Plan/focus): Continue to encourage activity      Maritza Cartwright RN   11/24/2024   1:02 PM         "

## 2024-11-24 NOTE — PROGRESS NOTES
..sc  Patient Name: Lance Ibrahim   MRN: [id number]   Date of Admission: 11/14/2024    Procedure: Procedure(s):  CORONARY ARTERY BYPASS GRAFT TIMES THREE, WITH LEFT INTERNAL MAMARARY ARTERY HARVEST, LEFT ENDOSCOPIC VESSEL PROCUREMENT, EPIAORTIC ULTRASOUND  ECHOCARDIOGRAM, TRANSESOPHAGEAL, INTRAOPERATIVE    Post Op day #:06    Subjective (Patient focus/Primary Problem for shift): Activity level and tolerance and IS use          Pain Goal0 Pain Rating0/10           Pain Medication/ Regime effective to reduce patient pain Scheduled Lidocaine  patch    Objective (Physical assessment):           Rhythm: normal sinus rhythm with AVB and BBB and PVC             Bowel Activity: yes if Yes indicate when: today          Bowel Medications: no            Incision: healing well          Incentive Spirometry Q 1-2 hour when awake:  yes Volume: 1000          Epicardial Pacing Wires:  not applicable            Patient Activity:           Up to chair for meals: yes          Ambulation with RN x2 (Not including CR): no           Shower Daily {YES/NO/NA:[id number]          Nasal  Nozin BID AM/PM x 10 days: no              Chest Tubes   Pleural: not applicable Draining: not applicable               Suction: not applicable              Mediastinal: not applicable Draining: not applicable               Suction: not applicable   Dressing Change Daily:not applicable If No, why?N/A                     Urinary Catheter: not applicable           Preventative WOC consult (need MD order): not applicable       Assessment (Nursing primary shift focus): Up with assist of 2 able to tolerate up in the recliner    Plan (Patient Care Plan/focus): continue to encourage IS use       Concepcion Herrera RN   11/24/2024   5:30 PM

## 2024-11-24 NOTE — PROGRESS NOTES
CVTS Daily Progress Note   POD#6 s/p CABG x3 (LIMA>LAD, rSVG>RI, rSVG>distal OM) , LLE EVH w/ IABP in place  Attending: Nathan  LOS: 10    SUBJECTIVE/INTERVAL EVENTS:    No acute events overnight.. Patient progressing well. Maintaining oxygen saturations on RA. Normotensive. Some ambulation with PT. Pain well controlled.  + BM / + flatus. Tolerating diet without nausea. UOP adequate. Hgb stable. Answered all pts questions today. Kash CP, SOB, abd pain or calf pain.    OBJECTIVE:  Temp:  [97.6  F (36.4  C)-98.1  F (36.7  C)] 98.1  F (36.7  C)  Pulse:  [79-98] 98  Resp:  [18-20] 18  BP: (130-151)/(72-95) 151/95  SpO2:  [91 %-97 %] 95 %  Vitals:    11/19/24 0200 11/21/24 0040 11/22/24 0600 11/23/24 0635   Weight: 93.4 kg (205 lb 14.6 oz) 91.6 kg (201 lb 14.4 oz) 87.5 kg (192 lb 14.4 oz) 92.6 kg (204 lb 2.3 oz)    11/24/24 0610   Weight: 91.9 kg (202 lb 9.6 oz)       Clinically Significant Risk Factors          # Hyperchloremia: Highest Cl = 108 mmol/L in last 2 days, will monitor as appropriate          # Hypoalbuminemia: Lowest albumin = 2.6 g/dL at 11/16/2024  4:06 AM, will monitor as appropriate     # Hypertension: Noted on problem list    # Acute heart failure with reduced ejection fraction: last echo with EF <40% and receiving IV diuretics    # Acute Hypercapnic Respiratory Failure: based on venous blood gas results.  Continue supplemental oxygen and ventilatory support as indicated.        # DMII: A1C = 9.1 % (Ref range: <5.7 %) within past 6 months   # Overweight: Estimated body mass index is 27.48 kg/m  as calculated from the following:    Height as of this encounter: 1.829 m (6').    Weight as of this encounter: 91.9 kg (202 lb 9.6 oz).        # Financial/Environmental Concerns:     # History of CABG: noted on surgical history               Current Medications:    Scheduled Meds:  Current Facility-Administered Medications   Medication Dose Route Frequency Provider Last Rate Last Admin    allopurinol  (ZYLOPRIM) tablet 100 mg  100 mg Oral Daily McCroneAnnie, PA-C   100 mg at 11/24/24 0831    aspirin (ASA) chewable tablet 162 mg  162 mg Oral or NG Tube Daily McCrone Annie, PA-C   162 mg at 11/24/24 0831    Or    aspirin (ASA) Suppository 300 mg  300 mg Rectal Daily McCroneAnnie, PA-C   300 mg at 11/19/24 0917    atorvastatin (LIPITOR) tablet 40 mg  40 mg Oral QPM McCrone Annie, PA-C   40 mg at 11/23/24 2003    ferrous gluconate (FERGON) tablet 324 mg  324 mg Oral Daily McCrone Annie, PA-C   324 mg at 11/24/24 0831    finasteride (PROSCAR) tablet 5 mg  5 mg Oral Daily McCrone, Annie, PA-C   5 mg at 11/24/24 0831    furosemide (LASIX) tablet 20 mg  20 mg Oral Daily Orquidea Mendez PA-C   20 mg at 11/24/24 0831    [Held by provider] glipiZIDE (GLUCOTROL XL) 24 hr tablet 2.5 mg  2.5 mg Oral Daily Shania Barr PA-ANAIS        heparin ANTICOAGULANT injection 5,000 Units  5,000 Units Subcutaneous Q8H Annie Sarabia PA-C   5,000 Units at 11/24/24 0409    insulin aspart (NovoLOG) injection (RAPID ACTING)  1-10 Units Subcutaneous TID AC Annie Sarabia PA-C   1 Units at 11/24/24 0835    insulin aspart (NovoLOG) injection (RAPID ACTING)  1-7 Units Subcutaneous At Bedtime Annie Sarabia PA-C        insulin glargine (LANTUS PEN) injection 10 Units  10 Units Subcutaneous BID Annie Sarabia PA-C   10 Units at 11/24/24 0835    Lidocaine (LIDOCARE) 4 % Patch 1-2 patch  1-2 patch Transdermal Q24H Annie Sarabia PA-C   2 patch at 11/23/24 1718    metoprolol tartrate (LOPRESSOR) tablet 50 mg  50 mg Oral BID Orquidea Mendez PA-C   50 mg at 11/24/24 0831    pantoprazole (PROTONIX) 2 mg/mL suspension 40 mg  40 mg Oral or NG Tube QAM AC McCrone, Annie, PA-C        Or    pantoprazole (PROTONIX) EC tablet 40 mg  40 mg Oral QAM AC McCrone, Annie, PA-C   40 mg at 11/24/24 0634    polyethylene glycol (MIRALAX) Packet 17 g  17 g Oral Daily McCrone, Annie, PA-C   17 g at 11/21/24 0839    senna-docusate (SENOKOT-S/PERICOLACE) 8.6-50 MG per  tablet 1 tablet  1 tablet Oral BID McCrone, Annie, PA-C   1 tablet at 11/21/24 0839    sodium bicarbonate tablet 1,950 mg  1,950 mg Oral TID McCrone, Annie, PA-C   1,950 mg at 11/24/24 0831    ursodiol (ACTIGALL) capsule 300 mg  300 mg Oral BID McCrone, Annie, PA-C   300 mg at 11/24/24 0831     Continuous Infusions:  Current Facility-Administered Medications   Medication Dose Route Frequency Provider Last Rate Last Admin    Med Instruction - Transition from IV Insulin Infusion to Sub-Q Insulin   Does not apply Continuous PRN McCrone, Annie, PA-C         PRN Meds:.  Current Facility-Administered Medications   Medication Dose Route Frequency Provider Last Rate Last Admin    acetaminophen (TYLENOL) tablet 650 mg  650 mg Oral Q4H PRN McCrone, Annie, PA-C   650 mg at 11/22/24 0811    bisacodyl (DULCOLAX) suppository 10 mg  10 mg Rectal Daily PRN McCrone, Annie, PA-C        calcium carbonate (TUMS) chewable tablet 1,000 mg  1,000 mg Oral TID PRN McCrone, Annie, PA-C        calcium gluconate 1 g in 50 mL in sodium chloride intermittent infusion  1 g Intravenous Once PRN McCrone, Annie, PA-C   1 g at 11/23/24 1835    calcium gluconate 2 g in  mL intermittent infusion  2 g Intravenous Once PRN McCrone, Annie, PA-C        calcium gluconate 3 g in sodium chloride 0.9 % 100 mL intermittent infusion  3 g Intravenous Once PRN McCrone, Annie, PA-C        glucose gel 15-30 g  15-30 g Oral Q15 Min PRN McCrone, Annie, PA-C        Or    dextrose 50 % injection 25-50 mL  25-50 mL Intravenous Q15 Min PRN McCrone, Annie, PA-C        Or    glucagon injection 1 mg  1 mg Subcutaneous Q15 Min PRN McCrone, Annie, PA-C        gabapentin (NEURONTIN) capsule 300 mg  300 mg Oral TID PRN McCrone, Annie, PA-C   300 mg at 11/21/24 2155    LubriFresh P.M. OINT 1 g  1 Application Ophthalmic BID PRN McCrone, Annie, PA-C   1 g at 11/20/24 0119    magnesium hydroxide (MILK OF MAGNESIA) suspension 30 mL  30 mL Oral Daily PRN Annie Sarabia PA-C        Med  Instruction - Transition from IV Insulin Infusion to Sub-Q Insulin   Does not apply Continuous PRN McCrone, Annie, PA-C        melatonin tablet 5 mg  5 mg Oral At Bedtime PRN McCrone, Annie, PA-C   5 mg at 11/21/24 2155    methocarbamol (ROBAXIN) tablet 500 mg  500 mg Oral 4x Daily PRN McCrone, Annie, PA-C   500 mg at 11/20/24 2223    naloxone (NARCAN) injection 0.2 mg  0.2 mg Intravenous Q2 Min PRN McCrone, Annie, PA-C        Or    naloxone (NARCAN) injection 0.4 mg  0.4 mg Intravenous Q2 Min PRN McCrone, Annie, PA-C        Or    naloxone (NARCAN) injection 0.2 mg  0.2 mg Intramuscular Q2 Min PRN McCrone, Annie, PA-C        Or    naloxone (NARCAN) injection 0.4 mg  0.4 mg Intramuscular Q2 Min PRN McCrone, Annie, PA-C        ondansetron (ZOFRAN ODT) ODT tab 4 mg  4 mg Oral Q6H PRN McCrone, Annie, PA-C        Or    ondansetron (ZOFRAN) injection 4 mg  4 mg Intravenous Q6H PRN McCrone, Annie, PA-C        oxyCODONE (ROXICODONE) tablet 5 mg  5 mg Oral Q4H PRN McCrone, Annie, PA-C   5 mg at 11/21/24 1629    Or    oxyCODONE (ROXICODONE) tablet 10 mg  10 mg Oral Q4H PRN McCrone, Annie, PA-C        prochlorperazine (COMPAZINE) injection 5 mg  5 mg Intravenous Q6H PRN McCrone, Annie, PA-C        Or    prochlorperazine (COMPAZINE) tablet 5 mg  5 mg Oral Q6H PRN McCrone, Annie, PA-C           Cardiographics:    Telemetry monitoring demonstrates sinus rhythm w/ rates in the 80s per my personal review.    Imaging:  Results for orders placed or performed during the hospital encounter of 11/14/24   US Carotid Bilateral    Impression    IMPRESSION:  1.  Mild plaque formation, velocities consistent with less than 50% stenosis in the right internal carotid artery.  2.  Mild plaque formation, velocities consistent with less than 50% stenosis in the left internal carotid artery.  3.  Flow within the vertebral arteries is antegrade.   CT Chest w/o Contrast    Impression    IMPRESSION:   1.  Heavy atherosclerotic calcification aortic root and  proximal ascending aorta.  2.  Moderate right and small left pleural effusions with associated compressive atelectasis/consolidation in the lung bases. Consolidative opacity left lower lobe somewhat rounded, likely representing atelectasis.  3.  Mildly enlarged mediastinal lymph nodes likely reactive.  4.  Otherwise stable exam.     PET Cardiac Viability    Impression    IMPRESSION:    Findings suggesting stunned, but viable left ventricular myocardium.   XR Chest Port 1 View    Impression    IMPRESSION: Cardiomegaly. Marker for balloon pump in the aortic arch. This could be pulled back approximately 2 cm. Findings discussed with ( nurse Key    ) clinical service at approximately 0605 hours. Cardiomegaly with mild-to-moderate pulmonary   vascular congestion. Neurostimulator leads in stable position. Small right pleural effusion suspected. The costophrenic angle has been omitted on this exam. Patchy area of atelectasis or infiltrate in the left lung base.     XR Chest Port 1 View    Impression    IMPRESSION: Radiopaque marker of the intra-aortic balloon pump at the level of the mid thoracic arch, unchanged. Mild cardiac enlargement and venous hypertension, unchanged. No significant pleural effusion on either side. Postoperative changes left   shoulder. Degenerative changes both shoulders and the spine, narrowing worse involving the left glenohumeral joint with remodeling of the left humeral head. Electrodes overlying the lower thoracic spine. Monitoring leads overlying the chest.       Labs, personally reviewed.  Hemoglobin   Date Value Ref Range Status   11/23/2024 10.9 (L) 13.3 - 17.7 g/dL Final   11/21/2024 11.6 (L) 13.3 - 17.7 g/dL Final   11/20/2024 10.4 (L) 13.3 - 17.7 g/dL Final   11/20/2024 10.5 (L) 13.3 - 17.7 g/dL Final   04/29/2021 12.2 (L) 13.3 - 17.7 g/dL Final   07/28/2020 10.7 (L) 13.3 - 17.7 g/dL Final   07/27/2020 11.7 (L) 13.3 - 17.7 g/dL Final     WBC   Date Value Ref Range Status   04/29/2021 8.5  4.0 - 11.0 10e9/L Final   07/28/2020 7.4 4.0 - 11.0 10e9/L Final   07/27/2020 10.4 4.0 - 11.0 10e9/L Final     WBC Count   Date Value Ref Range Status   11/23/2024 10.7 4.0 - 11.0 10e3/uL Final   11/21/2024 13.4 (H) 4.0 - 11.0 10e3/uL Final   11/20/2024 11.8 (H) 4.0 - 11.0 10e3/uL Final     Platelet Count   Date Value Ref Range Status   11/23/2024 306 150 - 450 10e3/uL Final   11/22/2024 287 150 - 450 10e3/uL Final   11/21/2024 284 150 - 450 10e3/uL Final   04/29/2021 250 150 - 450 10e9/L Final   07/28/2020 219 150 - 450 10e9/L Final   07/27/2020 247 150 - 450 10e9/L Final     Creatinine   Date Value Ref Range Status   11/23/2024 1.55 (H) 0.67 - 1.17 mg/dL Final   11/21/2024 1.57 (H) 0.67 - 1.17 mg/dL Final   11/20/2024 1.49 (H) 0.67 - 1.17 mg/dL Final   04/29/2021 1.19 0.66 - 1.25 mg/dL Final   07/28/2020 1.19 0.66 - 1.25 mg/dL Final   07/27/2020 1.30 (H) 0.66 - 1.25 mg/dL Final     Potassium   Date Value Ref Range Status   11/24/2024 3.8 3.4 - 5.3 mmol/L Final   11/23/2024 3.7 3.4 - 5.3 mmol/L Final   11/23/2024 3.7 3.4 - 5.3 mmol/L Final   08/27/2022 4.6 3.5 - 5.0 mmol/L Final   08/26/2022 4.8 3.5 - 5.0 mmol/L Final   08/25/2022 5.2 (H) 3.5 - 5.0 mmol/L Final   04/29/2021 4.7 3.4 - 5.3 mmol/L Final   07/28/2020 4.5 3.4 - 5.3 mmol/L Final   07/27/2020 3.9 3.4 - 5.3 mmol/L Final     Potassium POCT   Date Value Ref Range Status   11/18/2024 5.1 3.4 - 5.3 mmol/L Final   11/18/2024 5.3 3.4 - 5.3 mmol/L Final   11/18/2024 5.2 3.4 - 5.3 mmol/L Final     Magnesium   Date Value Ref Range Status   11/24/2024 2.1 1.7 - 2.3 mg/dL Final   11/23/2024 2.1 1.7 - 2.3 mg/dL Final   11/22/2024 2.0 1.7 - 2.3 mg/dL Final   01/24/2020 1.9 1.6 - 2.3 mg/dL Final   10/20/2018 2.0 1.6 - 2.3 mg/dL Final   05/01/2018 1.8 1.6 - 2.3 mg/dL Final          I/O:  I/O last 3 completed shifts:  In: 1516 [P.O.:1516]  Out: 1475 [Urine:1475]       Physical Exam:    General: Patient seen up to chair. NAD  HEENT: DONALDO, no sclera icterus, moist  mucosa  CV: RRR on monitor. 2+ peripheral pulses in all extremities. Mild edema.   Pulm: Non-labored effort on RA. Incision C/D/I.  Abd: Soft, NT, ND  : Voiding  Ext: Mild pedal edema, SCDs in place, warm, distal pulses intact. IABP site C/D/I  Neuro: CNs grossly intact      ASSESSMENT/PLAN:    Lance Ibrahim is a 79 year old male with a history of CAD w/ known LM stenosis, CKD1, DM2, chronic anemia nonischemic cardiomyopathy (thought to be d/t HTN) w/ chronic HFpEF, h/o CVA (bilateral PCA emboli), and spinal stenosis w/ spinal cord stimulator in place who is s/p CABG x3 .    Principal Problem:    NSTEMI (non-ST elevated myocardial infarction) (H)  Active Problems:    ST elevation MI (STEMI) (H)        NEURO:   - Scheduled Tylenol/lidocaine patches and PRN Tylenol/oxycodone for pain    CV:   - Pre-op EF 25-30%  - IABP removed POD#1  - Normotensive   - Metoprolol 50 mg PO BID with parameters  -  mg  -  Atorvastatin 40mg daily  - Chest tubes/TPW removed without immediate complications  - New Baseline Echo done 11/24- EF 20-25%, mild AI. EP consult placed for lifevest consideration  - Cardiology HF consulted, [facility] consulted for outpatient follow up    PULM:   - Extubated POD#2    - Maintaining oxygen saturations on RA  - Encourage pulmonary toilet    FEN/GI:  - Diet: Cardiac, 60g CHO per meal  - Continue electrolyte replacement protocol  - Bowel regimen, +BM 11/23    RENAL:  - adequate UOP/hr. Continue to monitor closely.  - Cr 1.57 (baseline ~ 1.3-1.4)  - Barnes removed POD#3  - Diuresis with 1 mg Bumex po daily  - fluid up on exam, TTE showed high RA pressures, mild MR, mild TR. Will likely need ongoing diuresis for HF/low EF    HEME:  - Acute blood loss anemia post-op.   - Hgb 10.9, no active bleeding concerns. Hep SQ, ASA  - INR 1.63 on 11/19, plan for 1u FFP  - POD#0 got 3u PRBCs and half dose factor VII    ID:  - Armida op ppx complete, afebrile. No concerns for infection    ENDO:   - HbA1c 9.1%  - BG  goal < 180 to promote optimal healing  - PTA on glipizide 2.5 mg qday, lantus 10u BID - holding PO DM2 med until POD#4 pending renal function  - Transition to sliding scale insulin    PPx:   - DVT: SCDs, SQ heparin TID, ambulation   - GI: Protonix 40mg PO daily    DISPO:   - General telemetry unit  - PT/OT recs at discharge: TCU  - Medically Ready for Discharge: possibly tomorrow after Ep eval for lifevest      Patient discussed with Dr. Belen Mendez PAGIULIANA   Cardiothoracic Surgery   November 24, 2024 at 8:37 AM  Please reach me on [employer] or secure chat

## 2024-11-24 NOTE — PLAN OF CARE
sc  Patient Name: Lance Ibrahim   MRN: [id number]   Date of Admission: 11/14/2024    Procedure: Procedure(s):  CORONARY ARTERY BYPASS GRAFT TIMES THREE, WITH LEFT INTERNAL MAMARARY ARTERY HARVEST, LEFT ENDOSCOPIC VESSEL PROCUREMENT, EPIAORTIC ULTRASOUND  ECHOCARDIOGRAM, TRANSESOPHAGEAL, INTRAOPERATIVE    Post Op day #:6    Subjective (Patient focus/Primary Problem for shift): Sleep/rest          Pain Goal 0 Pain Rating 0           Pain Medication/ Regime effective to reduce patient pain None    Objective (Physical assessment):           Rhythm: normal sinus rhythm first degree AVB, BBB            Bowel Activity: yes if Yes indicate when: 11/24/24          Bowel Medications: no            Incision: healing well          Incentive Spirometry Q 1-2 hour when awake:  yes  Volume: 1000ml          Epicardial Pacing Wires:  no            Patient Activity:           Up to chair for meals: not applicable          Ambulation with RN x2 (Not including CR): no           Shower Daily Yes on Day shift          Nasal  Nozin BID AM/PM x 10 days: yes              Chest Tubes   Pleural: not applicable Draining: not applicable               Suction: not applicable              Mediastinal: not applicable Draining: not applicable               Suction: not applicable   Dressing Change Daily:no If No, why? To be completed on day shift                     Urinary Catheter: no           Preventative WOC consult (need MD order): no       Assessment (Nursing primary shift focus): Patient denied pain, nausea or SOB. Repositioning in bed as patient allows. Patient had a large incontinent stool at before bedtime. Frequent urination. PVR this am 117ml.  Incisions healing well, no drainage noted. Cares grouped to allow rest.    Plan (Patient Care Plan/focus):   IS while awake  Shower today  Incision care  Increase activity      Bigg Heck RN   11/24/2024   5:37 AM

## 2024-11-24 NOTE — TREATMENT PLAN
"BP (!) 151/95 (BP Location: Right arm)   Pulse 98   Temp 98.1  F (36.7  C) (Oral)   Resp 18   Ht 1.829 m (6')   Wt 91.9 kg (202 lb 9.6 oz)   SpO2 95%   BMI 27.48 kg/m            Allergies   Allergen Reactions    Ancef [Cefazolin] Rash    Cephalexin Itching and Rash     Allergic reaction required hospitalization 4/2024    Penicillins Anaphylaxis    Sulfa Antibiotics      \"itchy rash, swelling of face and hands\"       Intake/Output Summary (Last 24 hours) at 11/24/2024 1026  Last data filed at 11/24/2024 0830  Gross per 24 hour   Intake 1314 ml   Output 1425 ml   Net -111 ml        RCAT Treatment Plan    Patient Score: 5  Patient Acuity: 4    Clinical Indication for Therapy: s/p cvts    Therapy Ordered:  IS/Aerobika QID - pt using well on own.     Assessment Summary: Good voicing. Pt on RA. Pt speaks in complete sentences. No accessory muscle use. No nasal flaring. No retractions. Pt denies SOB. NPC. Breath sounds clear and decreased. Pt has no questions or concerns. Pt has good technique. Pt achieves 1.0 - 1.5 L with IS. Pt reports using device on own.     Jagjit Polanco, RT  11/24/2024    "

## 2024-11-25 ENCOUNTER — TELEPHONE (OUTPATIENT)
Dept: CARDIOLOGY | Facility: CLINIC | Age: 79
End: 2024-11-25
Payer: COMMERCIAL

## 2024-11-25 ENCOUNTER — APPOINTMENT (OUTPATIENT)
Dept: OCCUPATIONAL THERAPY | Facility: HOSPITAL | Age: 79
End: 2024-11-25
Attending: INTERNAL MEDICINE
Payer: COMMERCIAL

## 2024-11-25 ENCOUNTER — APPOINTMENT (OUTPATIENT)
Dept: PHYSICAL THERAPY | Facility: HOSPITAL | Age: 79
End: 2024-11-25
Attending: INTERNAL MEDICINE
Payer: COMMERCIAL

## 2024-11-25 ENCOUNTER — MEDICAL CORRESPONDENCE (OUTPATIENT)
Dept: HEALTH INFORMATION MANAGEMENT | Facility: CLINIC | Age: 79
End: 2024-11-25

## 2024-11-25 DIAGNOSIS — I50.9 ACUTE DECOMPENSATED HEART FAILURE (H): Primary | ICD-10-CM

## 2024-11-25 LAB
GLUCOSE BLDC GLUCOMTR-MCNC: 140 MG/DL (ref 70–99)
GLUCOSE BLDC GLUCOMTR-MCNC: 163 MG/DL (ref 70–99)
GLUCOSE BLDC GLUCOMTR-MCNC: 167 MG/DL (ref 70–99)
GLUCOSE BLDC GLUCOMTR-MCNC: 208 MG/DL (ref 70–99)
HOLD SPECIMEN: NORMAL
MAGNESIUM SERPL-MCNC: 2.3 MG/DL (ref 1.7–2.3)
POTASSIUM SERPL-SCNC: 3.9 MMOL/L (ref 3.4–5.3)

## 2024-11-25 PROCEDURE — 83735 ASSAY OF MAGNESIUM: CPT | Performed by: THORACIC SURGERY (CARDIOTHORACIC VASCULAR SURGERY)

## 2024-11-25 PROCEDURE — 250N000013 HC RX MED GY IP 250 OP 250 PS 637: Performed by: PHYSICIAN ASSISTANT

## 2024-11-25 PROCEDURE — 84132 ASSAY OF SERUM POTASSIUM: CPT | Performed by: THORACIC SURGERY (CARDIOTHORACIC VASCULAR SURGERY)

## 2024-11-25 PROCEDURE — 210N000001 HC R&B IMCU HEART CARE

## 2024-11-25 PROCEDURE — 97530 THERAPEUTIC ACTIVITIES: CPT | Mod: GP

## 2024-11-25 PROCEDURE — 250N000013 HC RX MED GY IP 250 OP 250 PS 637: Performed by: INTERNAL MEDICINE

## 2024-11-25 PROCEDURE — 999N000157 HC STATISTIC RCP TIME EA 10 MIN

## 2024-11-25 PROCEDURE — 250N000011 HC RX IP 250 OP 636: Performed by: PHYSICIAN ASSISTANT

## 2024-11-25 PROCEDURE — 36415 COLL VENOUS BLD VENIPUNCTURE: CPT | Performed by: THORACIC SURGERY (CARDIOTHORACIC VASCULAR SURGERY)

## 2024-11-25 PROCEDURE — 97110 THERAPEUTIC EXERCISES: CPT | Mod: GP

## 2024-11-25 PROCEDURE — 97110 THERAPEUTIC EXERCISES: CPT | Mod: GO

## 2024-11-25 PROCEDURE — 97535 SELF CARE MNGMENT TRAINING: CPT | Mod: GO

## 2024-11-25 PROCEDURE — 99223 1ST HOSP IP/OBS HIGH 75: CPT | Performed by: INTERNAL MEDICINE

## 2024-11-25 PROCEDURE — 94799 UNLISTED PULMONARY SVC/PX: CPT

## 2024-11-25 RX ORDER — SPIRONOLACTONE 25 MG/1
25 TABLET ORAL DAILY
Status: DISCONTINUED | OUTPATIENT
Start: 2024-11-25 | End: 2024-11-26 | Stop reason: HOSPADM

## 2024-11-25 RX ORDER — METOPROLOL SUCCINATE 100 MG/1
100 TABLET, EXTENDED RELEASE ORAL DAILY
Status: DISCONTINUED | OUTPATIENT
Start: 2024-11-26 | End: 2024-11-26 | Stop reason: HOSPADM

## 2024-11-25 RX ORDER — METOPROLOL TARTRATE 25 MG/1
100 TABLET, FILM COATED ORAL 2 TIMES DAILY
Status: DISCONTINUED | OUTPATIENT
Start: 2024-11-25 | End: 2024-11-25

## 2024-11-25 RX ADMIN — BUMETANIDE 1 MG: 1 TABLET ORAL at 08:31

## 2024-11-25 RX ADMIN — FINASTERIDE 5 MG: 5 TABLET, FILM COATED ORAL at 08:31

## 2024-11-25 RX ADMIN — METOPROLOL TARTRATE 100 MG: 25 TABLET, FILM COATED ORAL at 08:31

## 2024-11-25 RX ADMIN — HEPARIN SODIUM 5000 UNITS: 5000 INJECTION, SOLUTION INTRAVENOUS; SUBCUTANEOUS at 04:38

## 2024-11-25 RX ADMIN — ASPIRIN 81 MG CHEWABLE TABLET 162 MG: 81 TABLET CHEWABLE at 08:31

## 2024-11-25 RX ADMIN — LIDOCAINE 1 PATCH: 4 PATCH TOPICAL at 17:55

## 2024-11-25 RX ADMIN — FERROUS GLUCONATE 324 MG: 324 TABLET ORAL at 08:31

## 2024-11-25 RX ADMIN — URSODIOL 300 MG: 300 CAPSULE ORAL at 20:06

## 2024-11-25 RX ADMIN — ATORVASTATIN CALCIUM 40 MG: 40 TABLET, FILM COATED ORAL at 20:06

## 2024-11-25 RX ADMIN — SODIUM BICARBONATE 650 MG TABLET 1950 MG: at 20:06

## 2024-11-25 RX ADMIN — PANTOPRAZOLE SODIUM 40 MG: 40 TABLET, DELAYED RELEASE ORAL at 06:31

## 2024-11-25 RX ADMIN — INSULIN GLARGINE 10 UNITS: 100 INJECTION, SOLUTION SUBCUTANEOUS at 21:22

## 2024-11-25 RX ADMIN — URSODIOL 300 MG: 300 CAPSULE ORAL at 08:31

## 2024-11-25 RX ADMIN — ALLOPURINOL 100 MG: 100 TABLET ORAL at 08:31

## 2024-11-25 RX ADMIN — INSULIN ASPART 1 UNITS: 100 INJECTION, SOLUTION INTRAVENOUS; SUBCUTANEOUS at 08:38

## 2024-11-25 RX ADMIN — HEPARIN SODIUM 5000 UNITS: 5000 INJECTION, SOLUTION INTRAVENOUS; SUBCUTANEOUS at 12:01

## 2024-11-25 RX ADMIN — HEPARIN SODIUM 5000 UNITS: 5000 INJECTION, SOLUTION INTRAVENOUS; SUBCUTANEOUS at 20:06

## 2024-11-25 RX ADMIN — INSULIN GLARGINE 10 UNITS: 100 INJECTION, SOLUTION SUBCUTANEOUS at 08:32

## 2024-11-25 RX ADMIN — SPIRONOLACTONE 25 MG: 25 TABLET, FILM COATED ORAL at 13:28

## 2024-11-25 RX ADMIN — INSULIN ASPART 3 UNITS: 100 INJECTION, SOLUTION INTRAVENOUS; SUBCUTANEOUS at 12:04

## 2024-11-25 RX ADMIN — SODIUM BICARBONATE 650 MG TABLET 1950 MG: at 08:32

## 2024-11-25 RX ADMIN — SODIUM BICARBONATE 650 MG TABLET 1950 MG: at 13:27

## 2024-11-25 ASSESSMENT — ACTIVITIES OF DAILY LIVING (ADL)
ADLS_ACUITY_SCORE: 0
ADLS_ACUITY_SCORE: 76
ADLS_ACUITY_SCORE: 0
ADLS_ACUITY_SCORE: 76
ADLS_ACUITY_SCORE: 0
ADLS_ACUITY_SCORE: 76
ADLS_ACUITY_SCORE: 0
ADLS_ACUITY_SCORE: 0
ADLS_ACUITY_SCORE: 76
ADLS_ACUITY_SCORE: 76
ADLS_ACUITY_SCORE: 0
ADLS_ACUITY_SCORE: 76
ADLS_ACUITY_SCORE: 0
ADLS_ACUITY_SCORE: 76
ADLS_ACUITY_SCORE: 76

## 2024-11-25 NOTE — CONSULTS
HEART CARE NOTE        Thank you, , for asking the [facility] team to see Lance Ibrahim to evaluate ADHF.    Assessment/Recommendations     1. Severe HFrEF c/b severe ADHF  Assessment / Plan  Near euvolemia on physical exam; denies HF symptoms of prthopnea, PND, fluid retention or edema - no changes to regimen at this time  Patient is high risk for adverse cardiac events 2/2 advanced age, frailty, CAD, renal dysfunction, systolic dysfunction, HTN, DM2  GDMT as detailed below    Current Pharmacotherapy AHA Guideline-Directed Medical Therapy   Lisinopril - not started given renal dysfunction Lisinopril 20 mg twice daily   Metoprolol 100 mg daily Carvedilol 25 mg twice daily   Spironolactone 25 mg Spironolactone 25 mg once daily   Hydralazine NA Hydralazine 100 mg three times daily   Isosorbide dinitrate NA Isosorbide dinitrate 40 mg three times daily   SGLT2 inhibitor:Dapagliflozin/Empagliflozin - not started  Dapagliflozin or Empagliflozin 10 mg daily     2. CAD c/b NSTEMI  Assessment / Plan  S/p CABG; denies chest pain or anginal equivalents  Continue ASA, atorvastatin, metoprolol    3. DEAN on CKD  Assessment / Plan  Continue to monitor UOP and renal function closely    4. HLP  Assessment / Plan  Continue atorvastatin    5. DM2  Assessment / Plan  Management per primary team  Currently on ISS    Clinically Significant Risk Factors               # Hypoalbuminemia: Lowest albumin = 2.6 g/dL at 11/16/2024  4:06 AM, will monitor as appropriate     # Hypertension: Noted on problem list  # Acute heart failure with reduced ejection fraction: last echo with EF <40% and receiving IV diuretics    # Acute Hypercapnic Respiratory Failure: based on venous blood gas results.  Continue supplemental oxygen and ventilatory support as indicated.        # DMII: A1C = 9.1 % (Ref range: <5.7 %) within past 6 months   # Overweight: Estimated body mass index is 27.54 kg/m  as calculated from the following:     Height as of this encounter: 1.829 m (6').    Weight as of this encounter: 92.1 kg (203 lb 0.7 oz).      # Financial/Environmental Concerns:     # History of CABG: noted on surgical history      Native vessel CAD  Cardiomyopathy  Systolic acute    Mixed disorder of acid-base balance and Other disorders of electrolyte and fluid balance, not elsewhere classified    Acute kidney failure, unspecified  CKD POA List: Stage 3b (GFR 30-44)    85 minutes spent reviewing prior records (including documentation, laboratory studies, cardiac testing/imaging), history and physical exam, planning, and subsequent documentation.    History of Present Illness/Subjective    Mr. Lance Ibrahim is a 79 year old male with a PMHx significant for (per Epic  notation) s/p CABG x3 (LIMA>LAD, rSVG>RI, rSVG>distal OM) , LLE EVH w/ IABP in place     Today, Mr. Ibrahim denies acute cardiac events or complaints; Management plan as detailed above    ECG: Personally reviewed. normal sinus rhythm, nonspecific ST and T waves changes, PAC's noted.    ECHO (personnaly Reviewed on 11/25/24):   Left ventricular function is decreased. The ejection fraction is 20-25%  (severely reduced).  There is severe global hypokinesia of the left ventricle.  Moderately decreased right ventricular systolic function  There is mild (1+) aortic regurgitation.  Moderate aortic valve calcification is present.  There is no pericardial effusion.  IVC diameter >2.1 cm collapsing <50% with sniff suggests a high RA pressure  estimated at 15 mmHg or greater.    Telemetry: personally reviewed November 25, 2024; notable for sinus rhythm     Lab results: personally reviewed November 25, 2024; notable for DEAN    Medical history and pertinent documents reviewed in Care Everywhere please where applicable see details above          Physical Examination Review of Systems   BP (!) 157/75 (BP Location: Left arm)   Pulse 85   Temp 98.2  F (36.8  C) (Oral)   Resp 18   Ht 1.829 m (6')   Wt  92.1 kg (203 lb 0.7 oz)   SpO2 93%   BMI 27.54 kg/m    Body mass index is 27.54 kg/m .  Wt Readings from Last 3 Encounters:   11/25/24 92.1 kg (203 lb 0.7 oz)   11/14/24 87.1 kg (192 lb)   09/10/24 88 kg (194 lb)     General Appearance:   no distress, normal body habitus   ENT/Mouth: membranes moist, no oral lesions or bleeding gums.      EYES:  no scleral icterus, normal conjunctivae   Neck: no carotid bruits or thyromegaly   Chest/Lungs:   lungs are clear to auscultation, no rales or wheezing, equal chest wall expansion    Cardiovascular:   Regular. Normal first and second heart sounds with no murmurs, rubs, or gallops; the carotid, radial and posterior tibial pulses are intact, no JVD and trace LE edema bilaterally    Abdomen:  no organomegaly, masses, bruits, or tenderness; bowel sounds are present   Extremities: no cyanosis or clubbing   Skin: no xanthelasma, warm.    Neurologic: NAD     Psychiatric: alert and oriented x3, calm     A complete 10 systems ROS was reviewed  And is negative except what is listed in the HPI.          Medical History  Surgical History Family History Social History   Past Medical History:   Diagnosis Date    Anemia     Arthritis     osteoarthritis knees    BPH     CAD (coronary artery disease)     subtotal occlusion in the small distal LAD     Cardiomyopathy     Cerebral artery occlusion with cerebral infarction     TIA 1993, no residual    Cervicalgia     CHF (congestive heart failure) (H)     Chronic kidney disease     Chronic low back pain     Chronic rhinitis     Gouty arthropathy     hands    Hyperlipidemia     Hypertension     Kidney stone     Nocturia     Obesity     Osteomyelitis (H) 08/2023    Foot    PVD (peripheral vascular disease)     Sleep apnea     doesn't use cpap    Spinal cord stimulator status 04/2024    Spinal cord stimulator in place    TIA (transient ischaemic attack) 1993    Type 2 diabetes mellitus - 11/08/2018    Ureteral stone     Past Surgical History:    Procedure Laterality Date    AMPUTATE FOOT Right 08/07/2023    Procedure: AMPUTATION, right hallux;  Surgeon: Nakul Salazar DPM;  Location: [facility] OR    AMPUTATE TOE(S) Left 10/15/2018    Procedure: AMPUTATE TOE(S);  Left third toe amputation;  Surgeon: Ayaka Azevedo DPM, Podiatry/Foot and Ankle Surgery;  Location: [facility] OR    ARTHROPLASTY KNEE Right 12/07/2018    Procedure: Right total knee arthroplasty;  Surgeon: Issa Cunha MD;  Location:  OR    COLONOSCOPY  03/09/2013    Procedure: COLONOSCOPY;  COLONOSCOPY;  Surgeon: Chau Hogan MD;  Location: [facility]    CORONARY ARTERY BYPASS GRAFT, WITH ENDOSCOPIC VESSEL PROCUREMENT N/A 11/18/2024    Procedure: CORONARY ARTERY BYPASS GRAFT TIMES THREE, WITH LEFT INTERNAL MAMARARY ARTERY HARVEST, LEFT ENDOSCOPIC VESSEL PROCUREMENT, EPIAORTIC ULTRASOUND;  Surgeon: Tsering Evans MD;  Location: [facility] OR    CV CORONARY ANGIOGRAM N/A 11/14/2024    Procedure: Coronary Angiogram;  Surgeon: Talon Lew MD;  Location: [facility] LAB CV    CV HEART CATHETERIZATION WITH POSSIBLE INTERVENTION Left 03/05/2019    Procedure: Coronary Angiogram;  Surgeon: Nas Linda MD;  Location:  HEART CARDIAC CATH LAB    CV INTRA AORTIC BALLOON N/A 11/16/2024    Procedure: Intra aortic Balloon Pump Insertion;  Surgeon: Jessica Yepez MD;  Location: [facility] CV    CV INTRAVASULAR ULTRASOUND N/A 11/14/2024    Procedure: Intravascular Ultrasound;  Surgeon: Talon Lew MD;  Location: [facility] LAB CV    CV LEFT HEART CATH N/A 11/14/2024    Procedure: Left Heart Catheterization;  Surgeon: Talon Lew MD;  Location: [facility] LAB CV    Diastasis recti repair  1985    ENDOSCOPIC RETROGRADE CHOLANGIOPANCREATOGRAM N/A 04/30/2024    Procedure: ENDOSCOPIC RETROGRADE CHOLANGIOPANCREATOGRAPHY, BILIARY SPHINCTEROTOMY, DILATION, BRUSHING, BIOPSIES with DISTAL EXTRA HEPATIC BILE DUCT STRICTURE;  Surgeon: Aldo Gongora MD;   Location: [facility] OR    ESOPHAGOSCOPY, GASTROSCOPY, DUODENOSCOPY (EGD), COMBINED N/A 2024    Procedure: ESOPHAGOGASTRODUODENOSCOPY, WITH ENDOSCOPIC ULTRASOUND GUIDANCE;  Surgeon: Aldo Gongora MD;  Location: [facility] OR    EXTRACAPSULAR CATARACT EXTRATION WITH INTRAOCULAR LENS IMPLANT Left 2017    EXTRACAPSULAR CATARACT EXTRATION WITH INTRAOCULAR LENS IMPLANT Right     FOOT SURGERY  2013    cyst removal     HERNIA REPAIR  2004    ventral     IR DISC ASPIRATION INJECTION  2024    IRRIGATION AND DEBRIDEMENT SHOULDER, COMBINED Right 2024    Procedure: IRRIGATION AND DEBRIDEMENT, SHOULDER;  Surgeon: Terence Hanson MD;  Location: [facility] OR    IRRIGATION AND DEBRIDEMENT UPPER EXTREMITY, COMBINED Right 2024    Procedure: IRRIGATION AND DEBRIDEMENT, ELBOW AND THUMB;  Surgeon: Terence Hanson MD;  Location: [facility] OR    ORIF Shoulder Left     PROSTATE SURGERY  2024    Urolift    RESECT CLAVICLE DISTAL Right 2024    Procedure: EXCISION, CLAVICLE, DISTAL;  Surgeon: Terence Hanson MD;  Location: [facility] OR    SPINAL CORD STIMULATOR IMPLANT  2024    TRANSESOPHAGEAL ECHOCARDIOGRAM INTRAOPERATIVE N/A 2024    Procedure: ECHOCARDIOGRAM, TRANSESOPHAGEAL, INTRAOPERATIVE;  Surgeon: Tsering Evans MD;  Location: [facility] OR    Ventral hernia repair NOS  1987    no family history of premature coronary artery disease Social History     Socioeconomic History    Marital status:      Spouse name: Not on file    Number of children: Not on file    Years of education: Not on file    Highest education level: Not on file   Occupational History     Employer: SELF   Tobacco Use    Smoking status: Former     Current packs/day: 0.00     Types: Cigarettes     Quit date: 3/17/1993     Years since quittin.7     Passive exposure: Never    Smokeless tobacco: Never   Vaping Use    Vaping status: Never Used    Substance and Sexual Activity    Alcohol use: Not Currently    Drug use: No    Sexual activity: Never   Other Topics Concern    Parent/sibling w/ CABG, MI or angioplasty before 65F 55M? Not Asked   Social History Narrative    Not on file     Social Drivers of Health     Financial Resource Strain: Low Risk  (11/15/2024)    Financial Resource Strain     Within the past 12 months, have you or your family members you live with been unable to get utilities (heat, electricity) when it was really needed?: No   Food Insecurity: Low Risk  (11/15/2024)    Food Insecurity     Within the past 12 months, did you worry that your food would run out before you got money to buy more?: No     Within the past 12 months, did the food you bought just not last and you didn t have money to get more?: No   Transportation Needs: Low Risk  (11/15/2024)    Transportation Needs     Within the past 12 months, has lack of transportation kept you from medical appointments, getting your medicines, non-medical meetings or appointments, work, or from getting things that you need?: No   Physical Activity: Not on file   Stress: Not on file   Social Connections: Not on file   Interpersonal Safety: Low Risk  (11/14/2024)    Interpersonal Safety     Do you feel physically and emotionally safe where you currently live?: Yes     Within the past 12 months, have you been hit, slapped, kicked or otherwise physically hurt by someone?: No     Within the past 12 months, have you been humiliated or emotionally abused in other ways by your partner or ex-partner?: No   Housing Stability: Low Risk  (11/15/2024)    Housing Stability     Do you have housing? : Yes     Are you worried about losing your housing?: No           Lab Results    Chemistry/lipid CBC Cardiac Enzymes/BNP/TSH/INR   Lab Results   Component Value Date    CHOL 127 11/14/2024    HDL 23 (L) 11/14/2024    TRIG 80 11/14/2024    BUN 35.9 (H) 11/23/2024     11/23/2024    CO2 25 11/23/2024    Lab  "Results   Component Value Date    WBC 10.7 11/23/2024    HGB 10.9 (L) 11/23/2024    HCT 35.4 (L) 11/23/2024    MCV 92 11/23/2024     11/23/2024    Lab Results   Component Value Date    TSH 4.28 (H) 11/14/2024    INR 1.62 (H) 11/19/2024     No results found for: \"CKTOTAL\", \"CKMB\", \"TROPONINI\"       Weight:    Wt Readings from Last 3 Encounters:   11/25/24 92.1 kg (203 lb 0.7 oz)   11/14/24 87.1 kg (192 lb)   09/10/24 88 kg (194 lb)       Allergies  Allergies   Allergen Reactions    Ancef [Cefazolin] Rash    Cephalexin Itching and Rash     Allergic reaction required hospitalization 4/2024    Penicillins Anaphylaxis    Sulfa Antibiotics      \"itchy rash, swelling of face and hands\"         Surgical History  Past Surgical History:   Procedure Laterality Date    AMPUTATE FOOT Right 08/07/2023    Procedure: AMPUTATION, right hallux;  Surgeon: Nakul Salazar DPM;  Location: [facility] OR    AMPUTATE TOE(S) Left 10/15/2018    Procedure: AMPUTATE TOE(S);  Left third toe amputation;  Surgeon: Ayaka Azevedo DPM, Podiatry/Foot and Ankle Surgery;  Location:  OR    ARTHROPLASTY KNEE Right 12/07/2018    Procedure: Right total knee arthroplasty;  Surgeon: Issa Cunha MD;  Location:  OR    COLONOSCOPY  03/09/2013    Procedure: COLONOSCOPY;  COLONOSCOPY;  Surgeon: Chau Hogan MD;  Location:  GI    CORONARY ARTERY BYPASS GRAFT, WITH ENDOSCOPIC VESSEL PROCUREMENT N/A 11/18/2024    Procedure: CORONARY ARTERY BYPASS GRAFT TIMES THREE, WITH LEFT INTERNAL MAMARARY ARTERY HARVEST, LEFT ENDOSCOPIC VESSEL PROCUREMENT, EPIAORTIC ULTRASOUND;  Surgeon: Tsering Evans MD;  Location: [facility] OR    CV CORONARY ANGIOGRAM N/A 11/14/2024    Procedure: Coronary Angiogram;  Surgeon: Talon Lew MD;  Location: [facility] CATH LAB CV    CV HEART CATHETERIZATION WITH POSSIBLE INTERVENTION Left 03/05/2019    Procedure: Coronary Angiogram;  Surgeon: Nas Linda MD;  Location:  HEART CARDIAC CATH " LAB    CV INTRA AORTIC BALLOON N/A 11/16/2024    Procedure: Intra aortic Balloon Pump Insertion;  Surgeon: Jessica Yepez MD;  Location: [facility] LAB CV    CV INTRAVASULAR ULTRASOUND N/A 11/14/2024    Procedure: Intravascular Ultrasound;  Surgeon: Talon Lew MD;  Location: [facility] LAB CV    CV LEFT HEART CATH N/A 11/14/2024    Procedure: Left Heart Catheterization;  Surgeon: Talon Lew MD;  Location: [facility] LAB CV    Diastasis recti repair  1985    ENDOSCOPIC RETROGRADE CHOLANGIOPANCREATOGRAM N/A 04/30/2024    Procedure: ENDOSCOPIC RETROGRADE CHOLANGIOPANCREATOGRAPHY, BILIARY SPHINCTEROTOMY, DILATION, BRUSHING, BIOPSIES with DISTAL EXTRA HEPATIC BILE DUCT STRICTURE;  Surgeon: Aldo Gongora MD;  Location: [facility] OR    ESOPHAGOSCOPY, GASTROSCOPY, DUODENOSCOPY (EGD), COMBINED N/A 04/30/2024    Procedure: ESOPHAGOGASTRODUODENOSCOPY, WITH ENDOSCOPIC ULTRASOUND GUIDANCE;  Surgeon: Aldo Gongora MD;  Location: [facility] OR    EXTRACAPSULAR CATARACT EXTRATION WITH INTRAOCULAR LENS IMPLANT Left 03/13/2017    EXTRACAPSULAR CATARACT EXTRATION WITH INTRAOCULAR LENS IMPLANT Right     FOOT SURGERY  04/2013    cyst removal     HERNIA REPAIR  07/13/2004    ventral     IR DISC ASPIRATION INJECTION  6/19/2024    IRRIGATION AND DEBRIDEMENT SHOULDER, COMBINED Right 8/7/2024    Procedure: IRRIGATION AND DEBRIDEMENT, SHOULDER;  Surgeon: Terence Hanson MD;  Location: [facility] OR    IRRIGATION AND DEBRIDEMENT UPPER EXTREMITY, COMBINED Right 8/7/2024    Procedure: IRRIGATION AND DEBRIDEMENT, ELBOW AND THUMB;  Surgeon: Terence Hanson MD;  Location: [facility] OR    ORIF Shoulder Left     PROSTATE SURGERY  02/2024    Urolift    RESECT CLAVICLE DISTAL Right 8/7/2024    Procedure: EXCISION, CLAVICLE, DISTAL;  Surgeon: Terence Hanson MD;  Location: [facility] OR    SPINAL CORD STIMULATOR IMPLANT  04/03/2024    TRANSESOPHAGEAL  ECHOCARDIOGRAM INTRAOPERATIVE N/A 2024    Procedure: ECHOCARDIOGRAM, TRANSESOPHAGEAL, INTRAOPERATIVE;  Surgeon: Tsering Evans MD;  Location: [facility] OR    Ventral hernia repair NOS         Social History  Tobacco:   History   Smoking Status    Former    Types: Cigarettes   Smokeless Tobacco    Never    Alcohol:   Social History    Substance and Sexual Activity      Alcohol use: Not Currently   Illicit Drugs:   History   Drug Use No       Family History  Family History   Problem Relation Age of Onset    Family History Negative Mother          86 yo    Cancer Father          76 yo brain    Diabetes Maternal Grandfather          91 yo    Alcohol/Drug Paternal Grandfather             Colon Cancer No family hx of           Payam Brooks MD on 2024      cc: Rickey Adamson,

## 2024-11-25 NOTE — PROGRESS NOTES
Message received from Bárbara at [facility]. She is checking if they can take patient with a Life vest and will call CM back     3:01 PM  Bárbara is still checking on life vest.     Spoke to nurse Francia at [facility]. She also says they would need to check if they could meet patients needs before he returned. Says they would likely have to do an onsite assessment. Says she will have the DON call CM directly to discuss

## 2024-11-25 NOTE — PLAN OF CARE
sc  Patient Name: Lance Ibrahim   MRN: [id number]   Date of Admission: 11/14/2024    Procedure: Procedure(s):  CORONARY ARTERY BYPASS GRAFT TIMES THREE, WITH LEFT INTERNAL MAMARARY ARTERY HARVEST, LEFT ENDOSCOPIC VESSEL PROCUREMENT, EPIAORTIC ULTRASOUND  ECHOCARDIOGRAM, TRANSESOPHAGEAL, INTRAOPERATIVE    Post Op day #:7    Subjective (Patient focus/Primary Problem for shift): sleep/rest           Pain Goal 0 Pain Rating 0           Pain Medication/ Regime effective to reduce patient pain None    Objective (Physical assessment):           Rhythm: normal sinus rhythm fist degree AVB, BBB            Bowel Activity: yes if Yes indicate when: 11/24/2024          Bowel Medications: no            Incision: healing well          Incentive Spirometry Q 1-2 hour when awake:  yes Volume: 1200          Epicardial Pacing Wires:  no            Patient Activity:           Up to chair for meals: not applicable          Ambulation with RN x2 (Not including CR): not applicable           Shower Daily Yes          Nasal  Nozin BID AM/PM x 10 days: yes              Chest Tubes   Pleural: not applicable Draining: not applicable               Suction: not applicable              Mediastinal: not applicable Draining: not applicable               Suction: not applicable   Dressing Change Daily:no If No, why? To be completed on day shift                     Urinary Catheter: no           Preventative WOC consult (need MD order): no       Assessment (Nursing primary shift focus): Patient reported no pain, SOB, dizziness or nausea. Patient wanted to sleep. Cares grouped to allow rest/sleep. No BM overnight. Remains in NSR.  Patient refusing repositioning, can independently turn in bed.    Plan (Patient Care Plan/focus):   IS and flutter valve while awake  Shower  Incision care  Up in chair for meals; encourage activity        Bigg Heck RN   11/25/2024   5:50 AM

## 2024-11-25 NOTE — PROGRESS NOTES
RCAT Treatment Plan    Patient Score: 16  Patient Acuity: 2    Clinical Indication for Therapy: CPAP    Therapy Ordered: CPAP    Assessment Summary: Pt is visibly obstructing airway when falling asleep. CPAP order placed. Pt condition unlikely he will be able to wear. Will need a 1:1 when wearing.    RT Justice  11/25/2024

## 2024-11-25 NOTE — PLAN OF CARE
Goal Outcome Evaluation:      Plan of Care Reviewed With: patient    Overall Patient Progress: improvingOverall Patient Progress: improving     sc  Patient Name: Lance Ibrahim   MRN: [id number]   Date of Admission: 11/14/2024    Procedure: Procedure(s):  CORONARY ARTERY BYPASS GRAFT TIMES THREE, WITH LEFT INTERNAL MAMARARY ARTERY HARVEST, LEFT ENDOSCOPIC VESSEL PROCUREMENT, EPIAORTIC ULTRASOUND  ECHOCARDIOGRAM, TRANSESOPHAGEAL, INTRAOPERATIVE    Post Op day #:7      Subjective (Patient focus/Primary Problem for shift): pt comfort and awaiting placement          Pain Goal0 Pain Rating0           Pain Medication/ Regime effective to reduce patient pain pt denies pain    Objective (Physical assessment):           Rhythm: normal sinus rhythm            Bowel Activity: yes if Yes indicate when: 11/25            Bowel Medications: no            Incision: healing well          Incentive Spirometry Q 1-2 hour when awake:  yes          Epicardial Pacing Wires:  no            Patient Activity:           Up to chair for meals: yes          Ambulation with RN x2 (Not including CR): yes           Shower Daily {YES/NO/[id number]          Nasal  Nozin BID AM/PM x 10 days: yes              Chest Tubes   Pleural: no Draining: no               Suction: no              Mediastinal: no Draining: no               Suction: no   Dressing Change Daily:no If No, why?chest tubes removed                      Urinary Catheter: no           Preventative WOC consult (need MD order): no       Assessment (Nursing primary shift focus): Pt A/Ox4, on RA, SR w/ 1st degree AV block and BBB, denies pain, assistx2/lift device. ACHS BG. 1800 mL fluid restriction.   Shower completed this shift.   Mg and K protocol, recheck in AM   Plan (Patient Care Plan/focus): Awaiting placement       Amanda Tellez RN   11/25/2024   9:00 AM

## 2024-11-25 NOTE — PROGRESS NOTES
SW started [facility] [employer] authorization request for the pt through [employer] portal. Clinical documentation faxed to [employer] for review.     Zaira Ibanez, ELIESW on 11/25/2024 at 10:30 AM

## 2024-11-25 NOTE — PROGRESS NOTES
CVTS Daily Progress Note   POD#7 s/p CABG x3 (LIMA>LAD, rSVG>RI, rSVG>distal OM) , LLE EVH w/ IABP in place  Attending: Nathan  LOS: 11    SUBJECTIVE/INTERVAL EVENTS:    No acute events overnight.. Patient progressing well. Maintaining oxygen saturations on RA. Normotensive. Some ambulation with PT. Pain well controlled.  + BM / + flatus. Tolerating diet without nausea. UOP adequate. Hgb stable. Answered all pts questions today. Kash CP, SOB, abd pain or calf pain.    OBJECTIVE:  Temp:  [97.5  F (36.4  C)-98.2  F (36.8  C)] 98  F (36.7  C)  Pulse:  [63-88] 88  Resp:  [16-18] 18  BP: (126-157)/(71-86) 151/86  SpO2:  [92 %-96 %] 96 %  Vitals:    11/21/24 0040 11/22/24 0600 11/23/24 0635 11/24/24 0610   Weight: 91.6 kg (201 lb 14.4 oz) 87.5 kg (192 lb 14.4 oz) 92.6 kg (204 lb 2.3 oz) 91.9 kg (202 lb 9.6 oz)    11/25/24 0613   Weight: 92.1 kg (203 lb 0.7 oz)       Clinically Significant Risk Factors               # Hypoalbuminemia: Lowest albumin = 2.6 g/dL at 11/16/2024  4:06 AM, will monitor as appropriate     # Hypertension: Noted on problem list    # Acute heart failure with reduced ejection fraction: last echo with EF <40% and receiving IV diuretics    # Acute Hypercapnic Respiratory Failure: based on venous blood gas results.  Continue supplemental oxygen and ventilatory support as indicated.        # DMII: A1C = 9.1 % (Ref range: <5.7 %) within past 6 months   # Overweight: Estimated body mass index is 27.54 kg/m  as calculated from the following:    Height as of this encounter: 1.829 m (6').    Weight as of this encounter: 92.1 kg (203 lb 0.7 oz).        # Financial/Environmental Concerns:     # History of CABG: noted on surgical history           Current Medications:    Scheduled Meds:  Current Facility-Administered Medications   Medication Dose Route Frequency Provider Last Rate Last Admin    allopurinol (ZYLOPRIM) tablet 100 mg  100 mg Oral Daily Annie Sarabia PA-C   100 mg at 11/25/24 0831    aspirin  (ASA) chewable tablet 162 mg  162 mg Oral or NG Tube Daily McCrone, Annie, PA-C   162 mg at 11/25/24 0831    Or    aspirin (ASA) Suppository 300 mg  300 mg Rectal Daily McCrone, Annie, PA-C   300 mg at 11/19/24 0917    atorvastatin (LIPITOR) tablet 40 mg  40 mg Oral QPM McCrone Annie, PA-C   40 mg at 11/24/24 2005    bumetanide (BUMEX) tablet 1 mg  1 mg Oral Daily BrongJennifersa, PA-C   1 mg at 11/25/24 0831    ferrous gluconate (FERGON) tablet 324 mg  324 mg Oral Daily McCrone, Annie, PA-C   324 mg at 11/25/24 0831    finasteride (PROSCAR) tablet 5 mg  5 mg Oral Daily McCrone, Annie, PA-C   5 mg at 11/25/24 0831    [Held by provider] glipiZIDE (GLUCOTROL XL) 24 hr tablet 2.5 mg  2.5 mg Oral Daily Shania Barr PA-ANAIS        heparin ANTICOAGULANT injection 5,000 Units  5,000 Units Subcutaneous Q8H McCrone, Annie, PA-C   5,000 Units at 11/25/24 0438    insulin aspart (NovoLOG) injection (RAPID ACTING)  1-10 Units Subcutaneous TID AC McCrone, Annie, PA-C   1 Units at 11/25/24 0838    insulin aspart (NovoLOG) injection (RAPID ACTING)  1-7 Units Subcutaneous At Bedtime Jocelinronjaqui Annie, PA-C        insulin glargine (LANTUS PEN) injection 10 Units  10 Units Subcutaneous BID McCrone, Annie, PA-C   10 Units at 11/25/24 0832    Lidocaine (LIDOCARE) 4 % Patch 1-2 patch  1-2 patch Transdermal Q24H McCrone, Annie, PA-C   2 patch at 11/24/24 1630    metoprolol tartrate (LOPRESSOR) tablet 100 mg  100 mg Oral BID McCrone, Annie, PA-C   100 mg at 11/25/24 0831    pantoprazole (PROTONIX) 2 mg/mL suspension 40 mg  40 mg Oral or NG Tube QAM AC McCrone, Annie, PA-C        Or    pantoprazole (PROTONIX) EC tablet 40 mg  40 mg Oral QAM AC McCrone Annie, PA-C   40 mg at 11/25/24 0631    polyethylene glycol (MIRALAX) Packet 17 g  17 g Oral Daily McCroneAnnie, PA-C   17 g at 11/21/24 0839    senna-docusate (SENOKOT-S/PERICOLACE) 8.6-50 MG per tablet 1 tablet  1 tablet Oral BID McCrone, Annie, PA-C   1 tablet at 11/21/24 0839    sodium bicarbonate  tablet 1,950 mg  1,950 mg Oral TID McCrone, Annie, PA-C   1,950 mg at 11/25/24 0832    ursodiol (ACTIGALL) capsule 300 mg  300 mg Oral BID McCrone, Annie, PA-C   300 mg at 11/25/24 0831     Continuous Infusions:  Current Facility-Administered Medications   Medication Dose Route Frequency Provider Last Rate Last Admin    Med Instruction - Transition from IV Insulin Infusion to Sub-Q Insulin   Does not apply Continuous PRN McCrone, Annie, PA-C         PRN Meds:.  Current Facility-Administered Medications   Medication Dose Route Frequency Provider Last Rate Last Admin    acetaminophen (TYLENOL) tablet 650 mg  650 mg Oral Q4H PRN McCrone, Annie, PA-C   650 mg at 11/22/24 0811    bisacodyl (DULCOLAX) suppository 10 mg  10 mg Rectal Daily PRN McCrone, Annie, PA-C        calcium carbonate (TUMS) chewable tablet 1,000 mg  1,000 mg Oral TID PRN McCrone, Annie, PA-C        calcium gluconate 1 g in 50 mL in sodium chloride intermittent infusion  1 g Intravenous Once PRN McCrone, Annie, PA-C   1 g at 11/23/24 1835    calcium gluconate 2 g in  mL intermittent infusion  2 g Intravenous Once PRN McCrone, Annie, PA-C        calcium gluconate 3 g in sodium chloride 0.9 % 100 mL intermittent infusion  3 g Intravenous Once PRN McCrone, Annie, PA-C        glucose gel 15-30 g  15-30 g Oral Q15 Min PRN McCrone, Annie, PA-C        Or    dextrose 50 % injection 25-50 mL  25-50 mL Intravenous Q15 Min PRN McCrone, Annie, PA-C        Or    glucagon injection 1 mg  1 mg Subcutaneous Q15 Min PRN McCrone, Annie, PA-C        gabapentin (NEURONTIN) capsule 300 mg  300 mg Oral TID PRN McCrone, Annie, PA-C   300 mg at 11/21/24 2155    LubriFresh P.M. OINT 1 g  1 Application Ophthalmic BID PRN McCrone, Annie, PA-C   1 g at 11/20/24 0119    magnesium hydroxide (MILK OF MAGNESIA) suspension 30 mL  30 mL Oral Daily PRN McCrone, Annie, PA-C        Med Instruction - Transition from IV Insulin Infusion to Sub-Q Insulin   Does not apply Continuous PRN Annie Sarabia,  PA-C        melatonin tablet 5 mg  5 mg Oral At Bedtime PRN McCrone, Annie, PA-C   5 mg at 11/21/24 2155    methocarbamol (ROBAXIN) tablet 500 mg  500 mg Oral 4x Daily PRN McCrone, Annie, PA-C   500 mg at 11/20/24 2223    naloxone (NARCAN) injection 0.2 mg  0.2 mg Intravenous Q2 Min PRN McCrone, Annie, PA-C        Or    naloxone (NARCAN) injection 0.4 mg  0.4 mg Intravenous Q2 Min PRN McCrone, Annie, PA-C        Or    naloxone (NARCAN) injection 0.2 mg  0.2 mg Intramuscular Q2 Min PRN McCrone, Annie, PA-C        Or    naloxone (NARCAN) injection 0.4 mg  0.4 mg Intramuscular Q2 Min PRN McCrone, Annie, PA-C        ondansetron (ZOFRAN ODT) ODT tab 4 mg  4 mg Oral Q6H PRN McCrone, Annie, PA-C        Or    ondansetron (ZOFRAN) injection 4 mg  4 mg Intravenous Q6H PRN McCrone, Annie, PA-C        oxyCODONE (ROXICODONE) tablet 5 mg  5 mg Oral Q4H PRN McCrone, Annie, PA-C   5 mg at 11/21/24 1629    Or    oxyCODONE (ROXICODONE) tablet 10 mg  10 mg Oral Q4H PRN McCrone, Annie, PA-C        prochlorperazine (COMPAZINE) injection 5 mg  5 mg Intravenous Q6H PRN McCrone, Annie, PA-C        Or    prochlorperazine (COMPAZINE) tablet 5 mg  5 mg Oral Q6H PRN McCrone, Annie, PA-C           Cardiographics:    Telemetry monitoring demonstrates sinus rhythm w/ rates in the 80s per my personal review.    Imaging:  Results for orders placed or performed during the hospital encounter of 11/14/24   US Carotid Bilateral    Impression    IMPRESSION:  1.  Mild plaque formation, velocities consistent with less than 50% stenosis in the right internal carotid artery.  2.  Mild plaque formation, velocities consistent with less than 50% stenosis in the left internal carotid artery.  3.  Flow within the vertebral arteries is antegrade.   CT Chest w/o Contrast    Impression    IMPRESSION:   1.  Heavy atherosclerotic calcification aortic root and proximal ascending aorta.  2.  Moderate right and small left pleural effusions with associated compressive  atelectasis/consolidation in the lung bases. Consolidative opacity left lower lobe somewhat rounded, likely representing atelectasis.  3.  Mildly enlarged mediastinal lymph nodes likely reactive.  4.  Otherwise stable exam.     PET Cardiac Viability    Impression    IMPRESSION:    Findings suggesting stunned, but viable left ventricular myocardium.   XR Chest Port 1 View    Impression    IMPRESSION: Cardiomegaly. Marker for balloon pump in the aortic arch. This could be pulled back approximately 2 cm. Findings discussed with ( nurse Key    ) clinical service at approximately 0605 hours. Cardiomegaly with mild-to-moderate pulmonary   vascular congestion. Neurostimulator leads in stable position. Small right pleural effusion suspected. The costophrenic angle has been omitted on this exam. Patchy area of atelectasis or infiltrate in the left lung base.     XR Chest Port 1 View    Impression    IMPRESSION: Radiopaque marker of the intra-aortic balloon pump at the level of the mid thoracic arch, unchanged. Mild cardiac enlargement and venous hypertension, unchanged. No significant pleural effusion on either side. Postoperative changes left   shoulder. Degenerative changes both shoulders and the spine, narrowing worse involving the left glenohumeral joint with remodeling of the left humeral head. Electrodes overlying the lower thoracic spine. Monitoring leads overlying the chest.       Labs, personally reviewed.  Hemoglobin   Date Value Ref Range Status   11/23/2024 10.9 (L) 13.3 - 17.7 g/dL Final   11/21/2024 11.6 (L) 13.3 - 17.7 g/dL Final   11/20/2024 10.4 (L) 13.3 - 17.7 g/dL Final   11/20/2024 10.5 (L) 13.3 - 17.7 g/dL Final   04/29/2021 12.2 (L) 13.3 - 17.7 g/dL Final   07/28/2020 10.7 (L) 13.3 - 17.7 g/dL Final   07/27/2020 11.7 (L) 13.3 - 17.7 g/dL Final     WBC   Date Value Ref Range Status   04/29/2021 8.5 4.0 - 11.0 10e9/L Final   07/28/2020 7.4 4.0 - 11.0 10e9/L Final   07/27/2020 10.4 4.0 - 11.0 10e9/L Final      WBC Count   Date Value Ref Range Status   11/23/2024 10.7 4.0 - 11.0 10e3/uL Final   11/21/2024 13.4 (H) 4.0 - 11.0 10e3/uL Final   11/20/2024 11.8 (H) 4.0 - 11.0 10e3/uL Final     Platelet Count   Date Value Ref Range Status   11/23/2024 306 150 - 450 10e3/uL Final   11/22/2024 287 150 - 450 10e3/uL Final   11/21/2024 284 150 - 450 10e3/uL Final   04/29/2021 250 150 - 450 10e9/L Final   07/28/2020 219 150 - 450 10e9/L Final   07/27/2020 247 150 - 450 10e9/L Final     Creatinine   Date Value Ref Range Status   11/23/2024 1.55 (H) 0.67 - 1.17 mg/dL Final   11/21/2024 1.57 (H) 0.67 - 1.17 mg/dL Final   11/20/2024 1.49 (H) 0.67 - 1.17 mg/dL Final   04/29/2021 1.19 0.66 - 1.25 mg/dL Final   07/28/2020 1.19 0.66 - 1.25 mg/dL Final   07/27/2020 1.30 (H) 0.66 - 1.25 mg/dL Final     Potassium   Date Value Ref Range Status   11/25/2024 3.9 3.4 - 5.3 mmol/L Final   11/24/2024 3.8 3.4 - 5.3 mmol/L Final   11/23/2024 3.7 3.4 - 5.3 mmol/L Final   11/23/2024 3.7 3.4 - 5.3 mmol/L Final   08/27/2022 4.6 3.5 - 5.0 mmol/L Final   08/26/2022 4.8 3.5 - 5.0 mmol/L Final   08/25/2022 5.2 (H) 3.5 - 5.0 mmol/L Final   04/29/2021 4.7 3.4 - 5.3 mmol/L Final   07/28/2020 4.5 3.4 - 5.3 mmol/L Final   07/27/2020 3.9 3.4 - 5.3 mmol/L Final     Potassium POCT   Date Value Ref Range Status   11/18/2024 5.1 3.4 - 5.3 mmol/L Final   11/18/2024 5.3 3.4 - 5.3 mmol/L Final   11/18/2024 5.2 3.4 - 5.3 mmol/L Final     Magnesium   Date Value Ref Range Status   11/25/2024 2.3 1.7 - 2.3 mg/dL Final   11/24/2024 2.1 1.7 - 2.3 mg/dL Final   11/23/2024 2.1 1.7 - 2.3 mg/dL Final   01/24/2020 1.9 1.6 - 2.3 mg/dL Final   10/20/2018 2.0 1.6 - 2.3 mg/dL Final   05/01/2018 1.8 1.6 - 2.3 mg/dL Final          I/O:  I/O last 3 completed shifts:  In: 1080 [P.O.:1080]  Out: 1450 [Urine:1450]       Physical Exam:    General: Patient seen up to chair. NAD  HEENT: DONALDO, no sclera icterus, moist mucosa  CV: RRR on monitor. 2+ peripheral pulses in all extremities. Mild  edema.   Pulm: Non-labored effort on RA. Incision C/D/I.  Abd: Soft, NT, ND  : Voiding  Ext: Mild pedal edema, SCDs in place, warm, distal pulses intact. IABP site C/D/I  Neuro: CNs grossly intact      ASSESSMENT/PLAN:    Lance Ibrahim is a 79 year old male with a history of CAD w/ known LM stenosis, CKD1, DM2, chronic anemia nonischemic cardiomyopathy (thought to be d/t HTN) w/ chronic HFpEF, h/o CVA (bilateral PCA emboli), and spinal stenosis w/ spinal cord stimulator in place who is s/p CABG x3 .    Principal Problem:    NSTEMI (non-ST elevated myocardial infarction) (H)  Active Problems:    ST elevation MI (STEMI) (H)        NEURO:   - Scheduled Tylenol/lidocaine patches and PRN Tylenol/oxycodone for pain    CV:   - Pre-op EF 25-30%  - IABP removed POD#1  - Normotensive   - Metoprolol 100 mg PO BID with parameters  -  mg  -  Atorvastatin 40mg daily  - Chest tubes/TPW removed without immediate complications  - New Baseline Echo done 11/24- EF 20-25%, mild AI.   - Cardiology HF consulted, [facility] consulted for outpatient follow up and med reqs  - Lifevest ordered    PULM:   - Extubated POD#2    - Maintaining oxygen saturations on RA  - Encourage pulmonary toilet    FEN/GI:  - Diet: Cardiac, 60g CHO per meal  - Continue electrolyte replacement protocol  - Bowel regimen, +BM 11/23    RENAL:  - adequate UOP/hr. Continue to monitor closely.  - Cr 1.55(1.57) (baseline ~ 1.3-1.4)  - Barnes removed POD#3  - Diuresis with 1 mg Bumex po daily  - fluid up on exam, TTE showed high RA pressures, mild MR, mild TR. Will likely need ongoing diuresis for HF/low EF    HEME:  - Acute blood loss anemia post-op.   - Hgb 10.9, no active bleeding concerns. Hep SQ, ASA  - INR 1.63 on 11/19, plan for 1u FFP  - POD#0 got 3u PRBCs and half dose factor VII    ID:  - Armida op ppx complete, afebrile. No concerns for infection    ENDO:   - HbA1c 9.1%  - BG goal < 180 to promote optimal healing  - PTA on glipizide 2.5 mg qday, lantus  10u BID   - Transition to sliding scale insulin    PPx:   - DVT: SCDs, SQ heparin TID, ambulation   - GI: Protonix 40mg PO daily    DISPO:   - General telemetry unit  - PT/OT recs at discharge: TCU tomorrow after lifevest placed on pt.   - Medically Ready for Discharge: after HF consult for med reqs      Patient discussed with Dr. Nathan SarabiaPAWilliamC  [facility] Cardiothoracic Surgery  Vocera

## 2024-11-26 VITALS
BODY MASS INDEX: 26.15 KG/M2 | DIASTOLIC BLOOD PRESSURE: 89 MMHG | SYSTOLIC BLOOD PRESSURE: 151 MMHG | HEIGHT: 72 IN | HEART RATE: 86 BPM | OXYGEN SATURATION: 94 % | TEMPERATURE: 97.9 F | RESPIRATION RATE: 19 BRPM | WEIGHT: 193.1 LBS

## 2024-11-26 LAB
ANION GAP SERPL CALCULATED.3IONS-SCNC: 10 MMOL/L (ref 7–15)
BUN SERPL-MCNC: 24.3 MG/DL (ref 8–23)
CALCIUM SERPL-MCNC: 8.3 MG/DL (ref 8.8–10.4)
CHLORIDE SERPL-SCNC: 105 MMOL/L (ref 98–107)
CREAT SERPL-MCNC: 1.35 MG/DL (ref 0.67–1.17)
EGFRCR SERPLBLD CKD-EPI 2021: 53 ML/MIN/1.73M2
GLUCOSE BLDC GLUCOMTR-MCNC: 136 MG/DL (ref 70–99)
GLUCOSE BLDC GLUCOMTR-MCNC: 160 MG/DL (ref 70–99)
GLUCOSE BLDC GLUCOMTR-MCNC: 169 MG/DL (ref 70–99)
GLUCOSE BLDC GLUCOMTR-MCNC: 82 MG/DL (ref 70–99)
GLUCOSE SERPL-MCNC: 128 MG/DL (ref 70–99)
HCO3 SERPL-SCNC: 26 MMOL/L (ref 22–29)
MAGNESIUM SERPL-MCNC: 2.2 MG/DL (ref 1.7–2.3)
POTASSIUM SERPL-SCNC: 3.6 MMOL/L (ref 3.4–5.3)
POTASSIUM SERPL-SCNC: 3.6 MMOL/L (ref 3.4–5.3)
SODIUM SERPL-SCNC: 141 MMOL/L (ref 135–145)

## 2024-11-26 PROCEDURE — 250N000013 HC RX MED GY IP 250 OP 250 PS 637: Performed by: PHYSICIAN ASSISTANT

## 2024-11-26 PROCEDURE — 99233 SBSQ HOSP IP/OBS HIGH 50: CPT | Performed by: INTERNAL MEDICINE

## 2024-11-26 PROCEDURE — 36415 COLL VENOUS BLD VENIPUNCTURE: CPT | Performed by: THORACIC SURGERY (CARDIOTHORACIC VASCULAR SURGERY)

## 2024-11-26 PROCEDURE — 250N000013 HC RX MED GY IP 250 OP 250 PS 637: Performed by: THORACIC SURGERY (CARDIOTHORACIC VASCULAR SURGERY)

## 2024-11-26 PROCEDURE — 80048 BASIC METABOLIC PNL TOTAL CA: CPT | Performed by: INTERNAL MEDICINE

## 2024-11-26 PROCEDURE — 250N000013 HC RX MED GY IP 250 OP 250 PS 637: Performed by: INTERNAL MEDICINE

## 2024-11-26 PROCEDURE — 82310 ASSAY OF CALCIUM: CPT | Performed by: INTERNAL MEDICINE

## 2024-11-26 PROCEDURE — 84132 ASSAY OF SERUM POTASSIUM: CPT | Performed by: THORACIC SURGERY (CARDIOTHORACIC VASCULAR SURGERY)

## 2024-11-26 PROCEDURE — 250N000011 HC RX IP 250 OP 636: Performed by: PHYSICIAN ASSISTANT

## 2024-11-26 PROCEDURE — 83735 ASSAY OF MAGNESIUM: CPT | Performed by: THORACIC SURGERY (CARDIOTHORACIC VASCULAR SURGERY)

## 2024-11-26 RX ORDER — POTASSIUM CHLORIDE 1500 MG/1
20 TABLET, EXTENDED RELEASE ORAL ONCE
Status: COMPLETED | OUTPATIENT
Start: 2024-11-26 | End: 2024-11-26

## 2024-11-26 RX ORDER — GLIPIZIDE 2.5 MG/1
2.5 TABLET, EXTENDED RELEASE ORAL DAILY
DISCHARGE
Start: 2024-11-27

## 2024-11-26 RX ORDER — ASPIRIN 81 MG/1
162 TABLET, CHEWABLE ORAL DAILY
DISCHARGE
Start: 2024-11-27

## 2024-11-26 RX ORDER — BUMETANIDE 1 MG/1
1 TABLET ORAL DAILY
DISCHARGE
Start: 2024-11-27

## 2024-11-26 RX ORDER — PANTOPRAZOLE SODIUM 40 MG/1
40 TABLET, DELAYED RELEASE ORAL
DISCHARGE
Start: 2024-11-27

## 2024-11-26 RX ORDER — MINERAL OIL AND WHITE PETROLATUM 150; 830 MG/G; MG/G
1 OINTMENT OPHTHALMIC 2 TIMES DAILY PRN
DISCHARGE
Start: 2024-11-26

## 2024-11-26 RX ORDER — SODIUM BICARBONATE 650 MG/1
1950 TABLET ORAL 3 TIMES DAILY
DISCHARGE
Start: 2024-11-26

## 2024-11-26 RX ORDER — ACETAMINOPHEN 325 MG/1
650 TABLET ORAL EVERY 4 HOURS PRN
DISCHARGE
Start: 2024-11-26 | End: 2024-12-03

## 2024-11-26 RX ORDER — SPIRONOLACTONE 25 MG/1
25 TABLET ORAL DAILY
DISCHARGE
Start: 2024-11-27

## 2024-11-26 RX ORDER — GABAPENTIN 300 MG/1
300 CAPSULE ORAL 3 TIMES DAILY PRN
DISCHARGE
Start: 2024-11-26

## 2024-11-26 RX ORDER — ATORVASTATIN CALCIUM 40 MG/1
40 TABLET, FILM COATED ORAL EVERY EVENING
DISCHARGE
Start: 2024-11-26

## 2024-11-26 RX ORDER — ASPIRIN 81 MG/1
162 TABLET, CHEWABLE ORAL DAILY
Status: DISCONTINUED | OUTPATIENT
Start: 2024-11-26 | End: 2024-11-26 | Stop reason: HOSPADM

## 2024-11-26 RX ORDER — METOPROLOL SUCCINATE 100 MG/1
100 TABLET, EXTENDED RELEASE ORAL DAILY
DISCHARGE
Start: 2024-11-27

## 2024-11-26 RX ADMIN — POTASSIUM CHLORIDE 20 MEQ: 1500 TABLET, EXTENDED RELEASE ORAL at 09:10

## 2024-11-26 RX ADMIN — SODIUM BICARBONATE 650 MG TABLET 1950 MG: at 13:24

## 2024-11-26 RX ADMIN — FERROUS GLUCONATE 324 MG: 324 TABLET ORAL at 08:01

## 2024-11-26 RX ADMIN — HEPARIN SODIUM 5000 UNITS: 5000 INJECTION, SOLUTION INTRAVENOUS; SUBCUTANEOUS at 11:47

## 2024-11-26 RX ADMIN — SPIRONOLACTONE 25 MG: 25 TABLET, FILM COATED ORAL at 08:02

## 2024-11-26 RX ADMIN — METOPROLOL SUCCINATE 100 MG: 100 TABLET, EXTENDED RELEASE ORAL at 08:02

## 2024-11-26 RX ADMIN — INSULIN ASPART 2 UNITS: 100 INJECTION, SOLUTION INTRAVENOUS; SUBCUTANEOUS at 12:26

## 2024-11-26 RX ADMIN — INSULIN ASPART 1 UNITS: 100 INJECTION, SOLUTION INTRAVENOUS; SUBCUTANEOUS at 07:49

## 2024-11-26 RX ADMIN — GLIPIZIDE 2.5 MG: 2.5 TABLET, FILM COATED, EXTENDED RELEASE ORAL at 08:01

## 2024-11-26 RX ADMIN — INSULIN GLARGINE 10 UNITS: 100 INJECTION, SOLUTION SUBCUTANEOUS at 08:01

## 2024-11-26 RX ADMIN — SODIUM BICARBONATE 650 MG TABLET 1950 MG: at 08:02

## 2024-11-26 RX ADMIN — URSODIOL 300 MG: 300 CAPSULE ORAL at 08:02

## 2024-11-26 RX ADMIN — FINASTERIDE 5 MG: 5 TABLET, FILM COATED ORAL at 08:01

## 2024-11-26 RX ADMIN — BUMETANIDE 1 MG: 1 TABLET ORAL at 08:01

## 2024-11-26 RX ADMIN — ASPIRIN 81 MG CHEWABLE TABLET 162 MG: 81 TABLET CHEWABLE at 08:01

## 2024-11-26 RX ADMIN — PANTOPRAZOLE SODIUM 40 MG: 40 TABLET, DELAYED RELEASE ORAL at 06:45

## 2024-11-26 RX ADMIN — ALLOPURINOL 100 MG: 100 TABLET ORAL at 08:01

## 2024-11-26 RX ADMIN — HEPARIN SODIUM 5000 UNITS: 5000 INJECTION, SOLUTION INTRAVENOUS; SUBCUTANEOUS at 04:46

## 2024-11-26 ASSESSMENT — ACTIVITIES OF DAILY LIVING (ADL)
ADLS_ACUITY_SCORE: 76
ADLS_ACUITY_SCORE: 78
ADLS_ACUITY_SCORE: 76
ADLS_ACUITY_SCORE: 78
ADLS_ACUITY_SCORE: 76

## 2024-11-26 NOTE — PROGRESS NOTES
Care Management Discharge Note    Discharge Date: 11/26/2024       Discharge Disposition: Transitional Care    Discharge Services:      Discharge DME:      Discharge Transportation: health plan transportation    Private pay costs discussed: transportation costs    Does the patient's insurance plan have a 3 day qualifying hospital stay waiver?  Yes     Which insurance plan 3 day waiver is available? Alternative insurance waiver    Will the waiver be used for post-acute placement? Yes    PAS Confirmation Code: [id number]  Patient/family educated on Medicare website which has current facility and service quality ratings:      Education Provided on the Discharge Plan:    Persons Notified of Discharge Plans: patient and son Omari   Patient/Family in Agreement with the Plan:      Handoff Referral Completed: No, handoff not indicated or clinically appropriate    Additional Information:  See below     Jena Mcdaniel RN        [facility] auth received [id number]    Also spoke to Swati at [facility] to verify auth number and dates 11/25-11/29    Message sent to [facility] to see if they can accept    8:20 AM  Spoke to Bárbara at [facility]. Says they can accept patient today. She is requesting an information packet about the Life Vest be sent with     8:59 AM  Met with patient. He is agreeable to go to [facility] today and requests wheelchair transport. Is aware of potential OOP cost.     Transport arranged for 1700 (next available). Updated Bárbara at [facility].     Message left for codi Salcido requesting call back to update     10:06 AM  Updated codi Salcido who is agreeable to plan . Son wants to make sure patients remote for his spinal implant gets sent with patient upon discharge. Notified nurse Amanda

## 2024-11-26 NOTE — DISCHARGE SUMMARY
Cardiovascular Surgery Discharge Summary    Primary Care Physician:  Rickey Adamson  Discharge Provider: Annie Sarabia PA-C  Admission Date: 11/14/2024  Admission Diagnoses: ST elevation MI (STEMI) (H) [I21.3]  Discharge Date: 11/26/2024  Disposition:TCU   Condition at Discharge: Good  Code Status: Full Code     Principal Diagnosis:   NSTEMI (non-ST elevated myocardial infarction) (H) s/p coronary artery bypass grafting x 3    Discharge Diagnoses:    Active Problems:      Patient Active Problem List   Diagnosis    Ventral hernia    Chronic rhinitis    Hypertrophy of prostate with urinary obstruction    Transient cerebral ischemia    Hypertension    CARDIOVASCULAR SCREENING; LDL GOAL LESS THAN 100    Gout    Type 2 diabetes, HbA1c goal < 7% (H)    Cervicalgia    Severe sepsis (H)    Amputated toe, left (H)    Type 2 diabetes mellitus with peripheral vascular disease (H)    Total knee replacement status    Dyspnea on exertion    Chest pain, unspecified type    Cardiomyopathy, unspecified type (H)    Shortness of breath    Renal colic    Obesity (BMI 35.0-39.9) with comorbidity (H)    Stage 3b chronic kidney disease (H)    Ulcerated, foot (H)    Ulcerated, foot, unspecified laterality, limited to breakdown of skin (H)    Acute renal insufficiency    Acute right-sided thoracic back pain    Other constipation    Acute idiopathic gout of right hand    H/O amputation of lesser toe, left (H)    Lower urinary tract symptoms    Gouty arthropathy    Skin rash    Cellulitis of right lower extremity    Diabetic ulcer of toe of right foot associated with diabetes mellitus due to underlying condition, limited to breakdown of skin (H)    Slowing of urinary stream    Nocturia    Muscle pain    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    Diabetic neuropathy associated with type 2 diabetes mellitus (H)    Chronic low back pain    Cellulitis and abscess of foot excluding toe    Chronic systolic congestive heart failure (H)    Coronary artery disease  involving native coronary artery of native heart without angina pectoris    Enlarged prostate    Hyperglycemia    Elevated LFTs    Elevated lipase    Diffuse papular rash    Abdominal pain    Hypoxia    Fall, initial encounter    Bacteremia    Acute cystitis without hematuria    Back pain    Cerebrovascular accident (CVA) due to bilateral embolism of posterior cerebral arteries (H)    Hypertensive heart and kidney disease    Insomnia    Iron deficiency anemia    Osteoarthritis of left knee    Post-traumatic osteoarthritis    Acute pain of right shoulder    Fever, unspecified fever cause    Anemia, unspecified    Chronic kidney disease, stage 1    Extradural and subdural abscess, unspecified    Spinal stenosis, site unspecified    Biliary obstruction (H)    Common bile duct obstruction (H)    Muscle weakness (generalized)    Weakness    NSTEMI (non-ST elevated myocardial infarction) (H)    ST elevation MI (STEMI) (H)         Consult/s: Dietary, critical care medicine, cardiology    Surgery: 11/18/24  CAB x 3 with Dr. Evans   Median sternotomy   Take down of the left internal mammary artery    Endoscopic greater saphenous vein procurement from the  left lower extremity    Epiaortic ultrasound of the ascending aorta    Placement on central cardiopulmonary bypass    Triple vessel coronary artery bypass grafting;   -  Left internal mammary artery to the left anterior descending coronary artery  -  Separate reversed saphenous vein grafts to ramus intermedius and the largest distal obtuse marginal    Placement of temporary atrial and ventricular pacing wires    FINDINGS AT OPERATION:  His ascending aorta had a great deal of palpable plaque and this was confirmed on epiaortic ultrasound.  The areas were marked and avoided.  He had a large right pleural effusion.  His pulmonary artery pressures were elevated and his right heart was enlarged.  He had left ventricular hypertrophy and a reduced left ventricular function with  an LVEF of 25% and this improved to 40% postoperatively.  The left internal mammary artery was a 2.5 mm in diameter conduit with excellent flow.  The reversed vein graft measured 4 to 5 mm in diameter and was of good quality.  The left anterior descending coronary artery in its apical third was a 2 mm in diameter target vessel, an excellent vessel for bypass grafting.  The ramus intermedius was a 1.5 mm in diameter target vessel, a good vessel for bypass grafting.  The most distal obtuse marginal was a 2 mm in diameter target vessel, an excellent vessel for bypass grafting.     Discharge Medications:      Review of your medicines        START taking        Dose / Directions   aspirin 81 MG chewable tablet  Commonly known as: ASA  Used for: S/P CABG x 3  Replaces: aspirin 81 MG EC tablet      Dose: 162 mg  Start taking on: November 27, 2024  Take 2 tablets (162 mg) by mouth daily.  Refills: 0     atorvastatin 40 MG tablet  Commonly known as: LIPITOR  Used for: S/P CABG x 3      Dose: 40 mg  Take 1 tablet (40 mg) by mouth every evening.  Refills: 0     bumetanide 1 MG tablet  Commonly known as: BUMEX  Used for: S/P CABG x 3      Dose: 1 mg  Start taking on: November 27, 2024  Take 1 tablet (1 mg) by mouth daily.  Refills: 0     LubriFresh P.M. Oint  Used for: S/P CABG x 3      Dose: 1 Application  Apply 1 g to eye 2 times daily as needed for dry eyes.  Refills: 0     melatonin 5 MG tablet  Used for: S/P CABG x 3      Dose: 5 mg  Take 1 tablet (5 mg) by mouth nightly as needed for sleep.  Refills: 0     metoprolol succinate  MG 24 hr tablet  Commonly known as: TOPROL XL  Used for: S/P CABG x 3      Dose: 100 mg  Start taking on: November 27, 2024  Take 1 tablet (100 mg) by mouth daily.  Refills: 0     pantoprazole 40 MG EC tablet  Commonly known as: PROTONIX  Used for: S/P CABG x 3      Dose: 40 mg  Start taking on: November 27, 2024  Take 1 tablet (40 mg) by mouth every morning (before breakfast).  Refills: 0      spironolactone 25 MG tablet  Commonly known as: ALDACTONE  Used for: S/P CABG x 3      Dose: 25 mg  Start taking on: November 27, 2024  Take 1 tablet (25 mg) by mouth daily.  Refills: 0            CONTINUE these medicines which may have CHANGED, or have new prescriptions. If we are uncertain of the size of tablets/capsules you have at home, strength may be listed as something that might have changed.        Dose / Directions   acetaminophen 325 MG tablet  Commonly known as: TYLENOL  This may have changed:   when to take this  reasons to take this  Used for: S/P CABG x 3      Dose: 650 mg  Take 2 tablets (650 mg) by mouth every 4 hours as needed for other (For optimal non-opioid multimodal pain management to improve pain control.).  Refills: 0     gabapentin 300 MG capsule  Commonly known as: NEURONTIN  This may have changed:   when to take this  reasons to take this  Used for: S/P CABG x 3      Dose: 300 mg  Take 1 capsule (300 mg) by mouth 3 times daily as needed.  Refills: 0     insulin glargine 100 UNIT/ML pen  Commonly known as: LANTUS PEN  This may have changed: additional instructions  Used for: S/P CABG x 3      Dose: 10 Units  Inject 10 Units subcutaneously 2 times daily.  Refills: 0            CONTINUE these medicines which have NOT CHANGED        Dose / Directions   allopurinol 100 MG tablet  Commonly known as: ZYLOPRIM      Dose: 100 mg  Take 100 mg by mouth daily.  Refills: 0     Dexcom G7  Leeann  Used for: Type 2 diabetes mellitus with foot ulcer, without long-term current use of insulin (H)      Use to read blood sugars as per 's instructions.  Quantity: 1 each  Refills: 0     Dexcom G7 Sensor Misc  Used for: Type 2 diabetes mellitus with foot ulcer, without long-term current use of insulin (H)      Change every 10 days.  Quantity: 3 each  Refills: 5     ferrous gluconate 324 (38 Fe) MG tablet  Commonly known as: FERGON  Used for: Iron deficiency anemia, unspecified iron deficiency  anemia type      Dose: 324 mg  Take 1 tablet (324 mg) by mouth daily  Refills: 0     finasteride 5 MG tablet  Commonly known as: PROSCAR      Dose: 5 mg  Take 5 mg by mouth daily  Refills: 0     glipiZIDE 2.5 MG 24 hr tablet  Commonly known as: GLUCOTROL XL  Used for: S/P CABG x 3      Dose: 2.5 mg  Start taking on: November 27, 2024  Take 1 tablet (2.5 mg) by mouth daily.  Refills: 0     sodium bicarbonate 650 MG tablet  Used for: S/P CABG x 3      Dose: 1,950 mg  Take 3 tablets (1,950 mg) by mouth 3 times daily.  Refills: 0     Tums 500 MG chewable tablet  Generic drug: calcium carbonate      Dose: 1,000 mg  Take 1,000 mg by mouth 3 times daily as needed for heartburn  Refills: 0     ursodiol 300 MG capsule  Commonly known as: ACTIGALL  Used for: Biliary stricture (H)      Dose: 300 mg  Take 1 capsule (300 mg) by mouth 2 times daily  Quantity: 60 capsule  Refills: 0            STOP taking      aspirin 81 MG EC tablet  Replaced by: aspirin 81 MG chewable tablet        colchicine 0.6 MG tablet  Commonly known as: COLCRYS        diclofenac 1 % topical gel  Commonly known as: VOLTAREN        fluticasone 50 MCG/ACT nasal spray  Commonly known as: FLONASE        Fortify 50 Billion Probiot 50+ Cpdr        ibuprofen 400 MG tablet  Commonly known as: ADVIL/MOTRIN        lisinopril 10 MG tablet  Commonly known as: ZESTRIL        loperamide 2 MG tablet  Commonly known as: IMODIUM A-D        nortriptyline 10 MG capsule  Commonly known as: PAMELOR        polyethylene glycol-propylene glycol 0.4-0.3 % Soln ophthalmic solution  Commonly known as: SYSTANE ULTRA        torsemide 10 MG tablet  Commonly known as: DEMADEX        UNABLE TO FIND                 Discharge Instructions:    Follow up appointment with Primary Care Physician: Rickey Adamson within 7 days of discharge from TCU.  Follow up appointment with Specialist:    Follow with CV Surgery as scheduled.12/17 at 10:30 am   Follow up with heart failure clinic as scheduled.  "1/7/25 at 11:10 am   Follow-up with cardiology as scheduled with Dr Amaya 1/16/25 at 9:50 am    Diet: Cardiac    Activity/Restrictions: As tolerated with sternal precautions in mind (see below). No driving for 4 weeks or while on pain medication.     - Shower and wash your incisions daily with soap and water. No tub baths/hot tubs for 4 weeks. An antibacterial soap such as Dial or Safeguard is recommended.    - Check your incisions every day. If you notice any redness, drainage, or anything unusual, please call the surgeons office.    - No driving for 4 weeks after surgery or while on pain medication     - Do not lift anything more than 10 pounds for 8 weeks after surgery. After 8 weeks, advance lifting gradually as tolerated.    - You may have watery drainage from your chest tube site for 2-3 weeks after surgery. Your may cover with a Band-Aid to protect your clothing. Remove the Band-Aid every day and wash the site.    - If you have a leg lesion, you may have swelling for 2-3 months. Elevate your leg any time you are not walking.    - If you feel any \"popping\" or \"clicking\" sensations in your chest, your arms are out too far or you are putting too much weight into arm movements. Do not reach over your head or out to the side to pull something. Do not do any arm exercises or use any exercise equipment that involves arm movement. If you feel your sternum moving, call the surgeon's office.    - Increase your daily activity as explained by Cardiac Rehab. You are encouraged to enroll in an Outpatient Cardiac Rehab Program.    - No active sports using your upper arms for 3 months. This includes fishing, hunting, bowling, swimming, tennis or golf.    - No physical activity such as cutting the grass, raking, vacuuming, changing sheets on your bed, snow shoveling, or using a  for 3 months.    - Use incentive spirometer 6-8 times per day for 2 weeks.       Hospital Summary:   Lance Ibrahim is a 79 year old year " old gentleman who presented with a STEMI on 11/14/24.  Subsequent coronary angiography revealed very tight left main disease and a totally occluded RCA. Over the weekend he had chest pain at rest and an intra-aortic balloon pump was placed. He was referred to CV surgery for evaluation for possible coronary artery revascularization.     Patient was deemed a candidate for coronary artery bypass surgery, and was taken to the operating room on 11/18/24 where patient underwent 3 vessel coronary artery bypass and endoscopic vein harvest from the left leg. Surgery was uneventful and patient was brought to the ICU post-operatively.IABP was removed POD#1. He was extubated on POD#2 and weaned from pressors. Patient was awake and alert, afebrile, and with stable vitals. Insulin drip was discontinued and he was transitioned to a sliding scale. He was transferred to general telemetry status on POD#4 where patient has had return of bowel function, is maintaining oxygen saturations on room air, had his chest tubes removed, and has no complaints of chest pain or shortness of breath. On 11/26/24, patient was stable enough to be discharged to TCU.    Of note, postop echo showed EF 25%, therefore a Lifevest was ordered and place on pt.before discharge to TCU. Lifevest ordered for 3 mos or until heart failure discontinues it. Heart failure was consulted and appropriate medications were prescribed with follow up appointments.    Vital signs:  Temp: 97.9  F (36.6  C) Temp src: Oral BP: (!) 144/84 Pulse: 88   Resp: 20 SpO2: 92 % O2 Device: None (Room air) Oxygen Delivery: 2 LPM Height: 182.9 cm (6') Weight: 87.6 kg (193 lb 1.6 oz)  Estimated body mass index is 26.19 kg/m  as calculated from the following:    Height as of this encounter: 1.829 m (6').    Weight as of this encounter: 87.6 kg (193 lb 1.6 oz).        Physical Exam:    Pertinent exam findings on day of discharge include:  Gen: Seen up in chair. NAD. Pleasant and conversant.    CV: RRR on monitor. No edema.  Pulm: Non-labored breathing on room air.  Abd: Soft, non-tender, non-distended  Neuro: CNs grossly intact  Inc: C/D/I      Annie Sarabia PA-C  [facility] Cardiothoracic Surgery  Vocera

## 2024-11-26 NOTE — PLAN OF CARE
Occupational Therapy Discharge Summary    Reason for therapy discharge:    Discharged to transitional care facility.    Progress towards therapy goal(s). See goals on Care Plan in [facility] electronic health record for goal details.  Goals partially met.  Barriers to achieving goals:   discharge from facility.    Therapy recommendation(s):    Continued therapy is recommended.  Rationale/Recommendations:  @U.    Laurel HARVEY OTR/L, ADRIENNET 11/26/2024 , 3:37 PM      Goal Outcome Evaluation:

## 2024-11-26 NOTE — PLAN OF CARE
Physical Therapy Discharge Summary    Reason for therapy discharge:    Discharged to transitional care facility.    Progress towards therapy goal(s). See goals on Care Plan in [facility] electronic health record for goal details.  Goals not met.  Barriers to achieving goals:   discharge from facility.    Therapy recommendation(s):    Recommend continued therapies to improve overall strength, balance and mobility.       Estee Espinoza, PT, DPT  11/26/2024

## 2024-11-26 NOTE — PROGRESS NOTES
HEART CARE NOTE          Assessment/Recommendations     1. Severe HFrEF c/b severe ADHF  Assessment / Plan  Near euvolemia on physical exam; denies HF symptoms of prthopnea, PND, fluid retention or edema - no changes at this time  Patient is high risk for adverse cardiac events 2/2 advanced age, frailty, CAD, renal dysfunction, systolic dysfunction, HTN, DM2  GDMT as detailed below     Current Pharmacotherapy AHA Guideline-Directed Medical Therapy   Lisinopril - not started given renal dysfunction Lisinopril 20 mg twice daily   Metoprolol 100 mg daily Carvedilol 25 mg twice daily   Spironolactone 25 mg Spironolactone 25 mg once daily   Hydralazine NA Hydralazine 100 mg three times daily   Isosorbide dinitrate NA Isosorbide dinitrate 40 mg three times daily   SGLT2 inhibitor:Dapagliflozin/Empagliflozin - not started  Dapagliflozin or Empagliflozin 10 mg daily      2. CAD c/b NSTEMI  Assessment / Plan  S/p CABG; denies chest pain or anginal equivalents  Continue ASA, atorvastatin, metoprolol     3. DEAN on CKD  Assessment / Plan  Resolving; Continue to monitor UOP and renal function closely     4. HLP  Assessment / Plan  Continue atorvastatin     5. DM2  Assessment / Plan  Management per primary team  Currently on ISS    Plan of care discussed on November 26, 2024 with patient at bedside, and primary team overseeing patient's care  Cardiology team will sign-off for now. Discharge on currently prescribed cardiac meds. Please do not hesitate to consult us again if new questions or concerns arise. Follow-up appointment will be arranged by [facility] clinic.     History of Present Illness/Subjective    Mr. Lance Ibrahim is a 79 year old male with a PMHx significant for (per Epic  notation) s/p CABG x3 (LIMA>LAD, rSVG>RI, rSVG>distal OM) , LLE EVH w/ IABP in place      Today, Mr. Ibrahim denies acute cardiac events or complaints; Management plan as detailed above     ECG: Personally reviewed. normal sinus rhythm, nonspecific ST  and T waves changes, PAC's noted.     ECHO (personnaly Reviewed on 11/25/24):   Left ventricular function is decreased. The ejection fraction is 20-25%  (severely reduced).  There is severe global hypokinesia of the left ventricle.  Moderately decreased right ventricular systolic function  There is mild (1+) aortic regurgitation.  Moderate aortic valve calcification is present.  There is no pericardial effusion.  IVC diameter >2.1 cm collapsing <50% with sniff suggests a high RA pressure  estimated at 15 mmHg or greater.    Telemetry: personally reviewed November 26, 2024; notable for sinus rhythm     Lab results: personally reviewed November 26, 2024; notable for resolving DEAN    Medical history and pertinent documents reviewed in Care Everywhere please where applicable see details above        Physical Examination Review of Systems   BP (!) 162/89 (BP Location: Right arm)   Pulse 87   Temp 98  F (36.7  C) (Oral)   Resp 19   Ht 1.829 m (6')   Wt 94.5 kg (208 lb 5.4 oz)   SpO2 96%   BMI 28.26 kg/m    Body mass index is 28.26 kg/m .  Wt Readings from Last 3 Encounters:   11/26/24 94.5 kg (208 lb 5.4 oz)   11/14/24 87.1 kg (192 lb)   09/10/24 88 kg (194 lb)     General Appearance:   no distress, normal body habitus   ENT/Mouth: membranes moist, no oral lesions or bleeding gums.      EYES:  no scleral icterus, normal conjunctivae   Neck: no carotid bruits or thyromegaly   Chest/Lungs:   lungs are clear to auscultation, no rales or wheezing, equal chest wall expansion    Cardiovascular:   Regular. Normal first and second heart sounds with no murmurs, rubs, or gallops; the carotid, radial and posterior tibial pulses are intact, no JVD or LE edema bilaterally    Abdomen:  no organomegaly, masses, bruits, or tenderness; bowel sounds are present   Extremities: no cyanosis or clubbing   Skin: no xanthelasma, warm.    Neurologic: NAD     Psychiatric: alert and oriented x3, calm     A complete 10 systems ROS was  reviewed  And is negative except what is listed in the HPI.          Medical History  Surgical History Family History Social History   Past Medical History:   Diagnosis Date    Anemia     Arthritis     osteoarthritis knees    BPH     CAD (coronary artery disease)     subtotal occlusion in the small distal LAD     Cardiomyopathy     Cerebral artery occlusion with cerebral infarction     TIA 1993, no residual    Cervicalgia     CHF (congestive heart failure) (H)     Chronic kidney disease     Chronic low back pain     Chronic rhinitis     Gouty arthropathy     hands    Hyperlipidemia     Hypertension     Kidney stone     Nocturia     Obesity     Osteomyelitis (H) 08/2023    Foot    PVD (peripheral vascular disease)     Sleep apnea     doesn't use cpap    Spinal cord stimulator status 04/2024    Spinal cord stimulator in place    TIA (transient ischaemic attack) 1993    Type 2 diabetes mellitus - 11/08/2018    Ureteral stone     Past Surgical History:   Procedure Laterality Date    AMPUTATE FOOT Right 08/07/2023    Procedure: AMPUTATION, right hallux;  Surgeon: Nakul Salazar DPM;  Location: [facility] OR    AMPUTATE TOE(S) Left 10/15/2018    Procedure: AMPUTATE TOE(S);  Left third toe amputation;  Surgeon: Ayaka Azevedo DPM, Podiatry/Foot and Ankle Surgery;  Location: [facility] OR    ARTHROPLASTY KNEE Right 12/07/2018    Procedure: Right total knee arthroplasty;  Surgeon: Issa Cunha MD;  Location:  OR    COLONOSCOPY  03/09/2013    Procedure: COLONOSCOPY;  COLONOSCOPY;  Surgeon: Chau Hogan MD;  Location: [facility]    CORONARY ARTERY BYPASS GRAFT, WITH ENDOSCOPIC VESSEL PROCUREMENT N/A 11/18/2024    Procedure: CORONARY ARTERY BYPASS GRAFT TIMES THREE, WITH LEFT INTERNAL MAMARARY ARTERY HARVEST, LEFT ENDOSCOPIC VESSEL PROCUREMENT, EPIAORTIC ULTRASOUND;  Surgeon: Tsering Evans MD;  Location: [facility] OR    CV CORONARY ANGIOGRAM N/A 11/14/2024    Procedure: Coronary Angiogram;  Surgeon: Louann  Talon WANG MD;  Location: [facility] CV    CV HEART CATHETERIZATION WITH POSSIBLE INTERVENTION Left 03/05/2019    Procedure: Coronary Angiogram;  Surgeon: Nas Linda MD;  Location:  HEART CARDIAC CATH LAB    CV INTRA AORTIC BALLOON N/A 11/16/2024    Procedure: Intra aortic Balloon Pump Insertion;  Surgeon: Jessica Yepez MD;  Location: [facility] CV    CV INTRAVASULAR ULTRASOUND N/A 11/14/2024    Procedure: Intravascular Ultrasound;  Surgeon: Talon Lew MD;  Location: [facility] CV    CV LEFT HEART CATH N/A 11/14/2024    Procedure: Left Heart Catheterization;  Surgeon: Talon Lew MD;  Location: [facility]    Diastasis recti repair  1985    ENDOSCOPIC RETROGRADE CHOLANGIOPANCREATOGRAM N/A 04/30/2024    Procedure: ENDOSCOPIC RETROGRADE CHOLANGIOPANCREATOGRAPHY, BILIARY SPHINCTEROTOMY, DILATION, BRUSHING, BIOPSIES with DISTAL EXTRA HEPATIC BILE DUCT STRICTURE;  Surgeon: Aldo Gongora MD;  Location: [facility] [place]    ESOPHAGOSCOPY, GASTROSCOPY, DUODENOSCOPY (EGD), COMBINED N/A 04/30/2024    Procedure: ESOPHAGOGASTRODUODENOSCOPY, WITH ENDOSCOPIC ULTRASOUND GUIDANCE;  Surgeon: Aldo Gongora MD;  Location: [facility] OR    EXTRACAPSULAR CATARACT EXTRATION WITH INTRAOCULAR LENS IMPLANT Left 03/13/2017    EXTRACAPSULAR CATARACT EXTRATION WITH INTRAOCULAR LENS IMPLANT Right     FOOT SURGERY  04/2013    cyst removal     HERNIA REPAIR  07/13/2004    ventral     IR DISC ASPIRATION INJECTION  6/19/2024    IRRIGATION AND DEBRIDEMENT SHOULDER, COMBINED Right 8/7/2024    Procedure: IRRIGATION AND DEBRIDEMENT, SHOULDER;  Surgeon: Terence Hanson MD;  Location: [facility] OR    IRRIGATION AND DEBRIDEMENT UPPER EXTREMITY, COMBINED Right 8/7/2024    Procedure: IRRIGATION AND DEBRIDEMENT, ELBOW AND THUMB;  Surgeon: Terence Hanson MD;  Location: [facility] OR    ORIF Shoulder Left     PROSTATE SURGERY  02/2024    Urolift    RESECT  CLAVICLE DISTAL Right 2024    Procedure: EXCISION, CLAVICLE, DISTAL;  Surgeon: Terence Hanson MD;  Location: [facility] OR    SPINAL CORD STIMULATOR IMPLANT  2024    TRANSESOPHAGEAL ECHOCARDIOGRAM INTRAOPERATIVE N/A 2024    Procedure: ECHOCARDIOGRAM, TRANSESOPHAGEAL, INTRAOPERATIVE;  Surgeon: Tsering Evans MD;  Location: [facility] OR    Ventral hernia repair NOS      no family history of premature coronary artery disease Social History     Socioeconomic History    Marital status:      Spouse name: Not on file    Number of children: Not on file    Years of education: Not on file    Highest education level: Not on file   Occupational History     Employer: SELF   Tobacco Use    Smoking status: Former     Current packs/day: 0.00     Types: Cigarettes     Quit date: 3/17/1993     Years since quittin.7     Passive exposure: Never    Smokeless tobacco: Never   Vaping Use    Vaping status: Never Used   Substance and Sexual Activity    Alcohol use: Not Currently    Drug use: No    Sexual activity: Never   Other Topics Concern    Parent/sibling w/ CABG, MI or angioplasty before 65F 55M? Not Asked   Social History Narrative    Not on file     Social Drivers of Health     Financial Resource Strain: Low Risk  (11/15/2024)    Financial Resource Strain     Within the past 12 months, have you or your family members you live with been unable to get utilities (heat, electricity) when it was really needed?: No   Food Insecurity: Low Risk  (11/15/2024)    Food Insecurity     Within the past 12 months, did you worry that your food would run out before you got money to buy more?: No     Within the past 12 months, did the food you bought just not last and you didn t have money to get more?: No   Transportation Needs: Low Risk  (11/15/2024)    Transportation Needs     Within the past 12 months, has lack of transportation kept you from medical appointments, getting your medicines,  "non-medical meetings or appointments, work, or from getting things that you need?: No   Physical Activity: Not on file   Stress: Not on file   Social Connections: Not on file   Interpersonal Safety: Low Risk  (11/14/2024)    Interpersonal Safety     Do you feel physically and emotionally safe where you currently live?: Yes     Within the past 12 months, have you been hit, slapped, kicked or otherwise physically hurt by someone?: No     Within the past 12 months, have you been humiliated or emotionally abused in other ways by your partner or ex-partner?: No   Housing Stability: Low Risk  (11/15/2024)    Housing Stability     Do you have housing? : Yes     Are you worried about losing your housing?: No           Lab Results    Chemistry/lipid CBC Cardiac Enzymes/BNP/TSH/INR   Lab Results   Component Value Date    CHOL 127 11/14/2024    HDL 23 (L) 11/14/2024    TRIG 80 11/14/2024    BUN 35.9 (H) 11/23/2024     11/23/2024    CO2 25 11/23/2024    Lab Results   Component Value Date    WBC 10.7 11/23/2024    HGB 10.9 (L) 11/23/2024    HCT 35.4 (L) 11/23/2024    MCV 92 11/23/2024     11/23/2024    Lab Results   Component Value Date    TSH 4.28 (H) 11/14/2024    INR 1.62 (H) 11/19/2024     No results found for: \"CKTOTAL\", \"CKMB\", \"TROPONINI\"       Weight:    Wt Readings from Last 3 Encounters:   11/26/24 94.5 kg (208 lb 5.4 oz)   11/14/24 87.1 kg (192 lb)   09/10/24 88 kg (194 lb)       Allergies  Allergies   Allergen Reactions    Ancef [Cefazolin] Rash    Cephalexin Itching and Rash     Allergic reaction required hospitalization 4/2024    Penicillins Anaphylaxis    Sulfa Antibiotics      \"itchy rash, swelling of face and hands\"         Surgical History  Past Surgical History:   Procedure Laterality Date    AMPUTATE FOOT Right 08/07/2023    Procedure: AMPUTATION, right hallux;  Surgeon: Nakul Salazar DPM;  Location: [facility] OR    AMPUTATE TOE(S) Left 10/15/2018    Procedure: AMPUTATE TOE(S);  Left " third toe amputation;  Surgeon: Ayaka Azevedo DPM, Podiatry/Foot and Ankle Surgery;  Location: [facility] OR    ARTHROPLASTY KNEE Right 12/07/2018    Procedure: Right total knee arthroplasty;  Surgeon: Issa Cunha MD;  Location: [facility] OR    COLONOSCOPY  03/09/2013    Procedure: COLONOSCOPY;  COLONOSCOPY;  Surgeon: Chau Hogan MD;  Location: [facility]    CORONARY ARTERY BYPASS GRAFT, WITH ENDOSCOPIC VESSEL PROCUREMENT N/A 11/18/2024    Procedure: CORONARY ARTERY BYPASS GRAFT TIMES THREE, WITH LEFT INTERNAL MAMARARY ARTERY HARVEST, LEFT ENDOSCOPIC VESSEL PROCUREMENT, EPIAORTIC ULTRASOUND;  Surgeon: Tsering Evans MD;  Location: [facility] OR    CV CORONARY ANGIOGRAM N/A 11/14/2024    Procedure: Coronary Angiogram;  Surgeon: Talon Lew MD;  Location: [facility] CV    CV HEART CATHETERIZATION WITH POSSIBLE INTERVENTION Left 03/05/2019    Procedure: Coronary Angiogram;  Surgeon: Nas Linda MD;  Location:  HEART CARDIAC CATH LAB    CV INTRA AORTIC BALLOON N/A 11/16/2024    Procedure: Intra aortic Balloon Pump Insertion;  Surgeon: Jessica Yepez MD;  Location: [facility] CV    CV INTRAVASULAR ULTRASOUND N/A 11/14/2024    Procedure: Intravascular Ultrasound;  Surgeon: Talon Lew MD;  Location: [facility] CV    CV LEFT HEART CATH N/A 11/14/2024    Procedure: Left Heart Catheterization;  Surgeon: Talon Lew MD;  Location: [facility] CV    Diastasis recti repair  1985    ENDOSCOPIC RETROGRADE CHOLANGIOPANCREATOGRAM N/A 04/30/2024    Procedure: ENDOSCOPIC RETROGRADE CHOLANGIOPANCREATOGRAPHY, BILIARY SPHINCTEROTOMY, DILATION, BRUSHING, BIOPSIES with DISTAL EXTRA HEPATIC BILE DUCT STRICTURE;  Surgeon: Aldo Gongora MD;  Location: [facility] OR    ESOPHAGOSCOPY, GASTROSCOPY, DUODENOSCOPY (EGD), COMBINED N/A 04/30/2024    Procedure: ESOPHAGOGASTRODUODENOSCOPY, WITH ENDOSCOPIC ULTRASOUND GUIDANCE;  Surgeon: Aldo Gongora MD;  Location: [facility]  OR    EXTRACAPSULAR CATARACT EXTRATION WITH INTRAOCULAR LENS IMPLANT Left 2017    EXTRACAPSULAR CATARACT EXTRATION WITH INTRAOCULAR LENS IMPLANT Right     FOOT SURGERY  2013    cyst removal     HERNIA REPAIR  2004    ventral     IR DISC ASPIRATION INJECTION  2024    IRRIGATION AND DEBRIDEMENT SHOULDER, COMBINED Right 2024    Procedure: IRRIGATION AND DEBRIDEMENT, SHOULDER;  Surgeon: Terence Hanson MD;  Location: [facility] OR    IRRIGATION AND DEBRIDEMENT UPPER EXTREMITY, COMBINED Right 2024    Procedure: IRRIGATION AND DEBRIDEMENT, ELBOW AND THUMB;  Surgeon: Treence Hanson MD;  Location: [facility] OR    ORIF Shoulder Left     PROSTATE SURGERY  2024    Urolift    RESECT CLAVICLE DISTAL Right 2024    Procedure: EXCISION, CLAVICLE, DISTAL;  Surgeon: Terence Hanson MD;  Location: [facility] OR    SPINAL CORD STIMULATOR IMPLANT  2024    TRANSESOPHAGEAL ECHOCARDIOGRAM INTRAOPERATIVE N/A 2024    Procedure: ECHOCARDIOGRAM, TRANSESOPHAGEAL, INTRAOPERATIVE;  Surgeon: Tsering Evans MD;  Location: [facility] OR    Ventral hernia repair NOS  1987       Social History  Tobacco:   History   Smoking Status    Former    Types: Cigarettes   Smokeless Tobacco    Never    Alcohol:   Social History    Substance and Sexual Activity      Alcohol use: Not Currently   Illicit Drugs:   History   Drug Use No       Family History  Family History   Problem Relation Age of Onset    Family History Negative Mother          88 yo    Cancer Father          76 yo brain    Diabetes Maternal Grandfather          89 yo    Alcohol/Drug Paternal Grandfather             Colon Cancer No family hx of           Payam Brooks MD on 2024      cc: Rickey Adamson

## 2024-11-26 NOTE — PROGRESS NOTES
[facility] was introduced to the patient today including our role in the home management of their heart failure.  Heart Failure Education Materials were shared today with the patient.  Introduction to the expectations including daily weight tracking using the Daily Weight Log  Home B/P monitoring as appropriate( to bring in home monitoring cuff to the clinic appointment for verification)  Low Sodium diet of 2000mg or less daily, review of label reading, aim for fresh and avoid processed items  Daily Fluid restriction of 2000ml  considerations  Exercise importance as able explained  Monitor and report s/s of heart failure. How to report these changes.  Follow up appointments and testing expectations.    Met with patient at bedside.  We reviewed our HF education materials.  Patient reports his son is active in his care, and we encouraged him to have his son call the HF Nurse line to review the education.  Patient reports he lives in assisted living with meal plans in place.  We discussed the importance in telling the facility that his meals needs to be following the 50-60 oz of fluid and 2000 mg of sodium diet goals.  [employer] application was left.  Reports he will have a knee replacement surgery in the near future, encouraged chair exercises to help prevent deconditioning.     Steve Kwan RN    [facility]   [phone] Nurse Line  [facility]   Heart [facility]   [address] [place] [address]

## 2024-11-26 NOTE — PLAN OF CARE
Goal Outcome Evaluation:      Plan of Care Reviewed With: patient    Overall Patient Progress: improvingOverall Patient Progress: improving     sc  Patient Name: Lance Ibrahim   MRN: [id number]   Date of Admission: 11/14/2024    Procedure: Procedure(s):  CORONARY ARTERY BYPASS GRAFT TIMES THREE, WITH LEFT INTERNAL MAMARARY ARTERY HARVEST, LEFT ENDOSCOPIC VESSEL PROCUREMENT, EPIAORTIC ULTRASOUND  ECHOCARDIOGRAM, TRANSESOPHAGEAL, INTRAOPERATIVE    Post Op day #: 8    Subjective (Patient focus/Primary Problem for shift): discharge organization          Pain Goal0 Pain Rating0           Pain Medication/ Regime effective to reduce patient painNA    Objective (Physical assessment):           Rhythm: normal sinus rhythm            Bowel Activity: yes if Yes indicate when: 11/26          Bowel Medications: no            Incision: healing well          Incentive Spirometry Q 1-2 hour when awake:  yes Volume: 2000            Epicardial Pacing Wires:  no            Patient Activity:           Up to chair for meals: yes          Ambulation with RN x2 (Not including CR): yes           Shower Daily yes          Nasal  Nozin BID AM/PM x 10 days: yes              Chest Tubes   Pleural: no Draining: no               Suction: no              Mediastinal: no Draining: no               Suction: no   Dressing Change Daily:no If No, why?chest tubes removed                      Urinary Catheter: no           Preventative WOC consult (need MD order): no       Assessment (Nursing primary shift focus): pt A/Ox4, on RA, denies pain, NSR w/ 1st degree AV block, assistx2/lift device. ACHS BG checks.  Lifevest in place. Shower and incisional care completed this shift.       Plan for pt to discharge to TCU at 1700 w/ [facility] transportation.     Plan (Patient Care Plan/focus): discharge pt to TCU      Amanda Tellez RN   11/26/2024   9:45 AM

## 2024-11-26 NOTE — PLAN OF CARE
sc  Patient Name: Lance Ibrahim   MRN: [id number]   Date of Admission: 11/14/2024    Procedure: Procedure(s):  CORONARY ARTERY BYPASS GRAFT TIMES THREE, WITH LEFT INTERNAL MAMARARY ARTERY HARVEST, LEFT ENDOSCOPIC VESSEL PROCUREMENT, EPIAORTIC ULTRASOUND  ECHOCARDIOGRAM, TRANSESOPHAGEAL, INTRAOPERATIVE    Post Op day #:8    Subjective (Patient focus/Primary Problem for shift): rest and go home          Pain Goal0 Pain Rating0           Pain Medication/ Regime effective to reduce patient pain scheduled lidocaine patch    Objective (Physical assessment):           Rhythm: normal sinus rhythm            Bowel Activity: yes if Yes indicate when: 11/25          Bowel Medications: no, pt refused. Having multiple BM            Incision: healing well          Incentive Spirometry Q 1-2 hour when awake:  yes Volume: 1200          Epicardial Pacing Wires:  no            Patient Activity:           Up to chair for meals: yes          Ambulation with RN x2 (Not including CR): no           Shower Daily {YES/NO/[id number]          Nasal  Nozin BID AM/PM x 10 days: yes              Chest Tubes   Pleural: no Draining: no               Suction: no              Mediastinal: no Draining: no               Suction: no   Dressing Change Daily:no If No, why?TANVI                     Urinary Catheter: no           Preventative WOC consult (need MD order): no       Assessment (Nursing primary shift focus): cluster cares to allow pt to sleep. Pt is now up in the chair for breakfast. NSR on tele monitor.   Life vest in place    Plan (Patient Care Plan/focus): Pt denies pain, alert and oriented x4. Assist in chair for all meals      Bigg Gallardo RN   11/26/2024   6:48 AM      Goal Outcome Evaluation:

## 2024-11-26 NOTE — PROGRESS NOTES
TARA assisting assigned care manager; PAS completed ([id number]),  8:18 AM    Brenna Kjellberg, BSW LSW  11/26/2024

## 2024-11-27 ENCOUNTER — PATIENT OUTREACH (OUTPATIENT)
Dept: CARE COORDINATION | Facility: CLINIC | Age: 79
End: 2024-11-27
Payer: COMMERCIAL

## 2024-11-27 NOTE — PROGRESS NOTES
Clinic Care Coordination Contact    Situation: Patient chart reviewed by care coordinator.    Background: Clinic Care Coordination Referral received from inpatient care team for transition handoff communication following hospital admission.    Assessment: Upon chart review, patient is not a candidate for Primary Care Clinic Care Coordination enrollment due to reason stated below:  Patient is receiving duplicative services from assisted living facility.    Plan/Recommendations: Clinic Care Coordination Referral/order cancelled. RN/SW CC will perform no further monitoring/outreaches at this time and will remain available as needed. If new needs arise, a new Care Coordination Referral may be placed.    Bonnie Kilpatrick,   [facility]  [phone]

## 2024-11-29 ENCOUNTER — HOSPITAL ENCOUNTER (EMERGENCY)
Facility: HOSPITAL | Age: 79
Discharge: OTHER INSTITUTION | End: 2024-11-29
Attending: EMERGENCY MEDICINE | Admitting: EMERGENCY MEDICINE
Payer: COMMERCIAL

## 2024-11-29 ENCOUNTER — APPOINTMENT (OUTPATIENT)
Dept: CT IMAGING | Facility: HOSPITAL | Age: 79
End: 2024-11-29
Attending: EMERGENCY MEDICINE
Payer: COMMERCIAL

## 2024-11-29 ENCOUNTER — TRANSITIONAL CARE UNIT VISIT (OUTPATIENT)
Dept: GERIATRICS | Facility: CLINIC | Age: 79
End: 2024-11-29
Payer: COMMERCIAL

## 2024-11-29 ENCOUNTER — MEDICAL CORRESPONDENCE (OUTPATIENT)
Dept: HEALTH INFORMATION MANAGEMENT | Facility: CLINIC | Age: 79
End: 2024-11-29

## 2024-11-29 ENCOUNTER — ANCILLARY PROCEDURE (OUTPATIENT)
Dept: ULTRASOUND IMAGING | Facility: HOSPITAL | Age: 79
End: 2024-11-29
Attending: EMERGENCY MEDICINE
Payer: COMMERCIAL

## 2024-11-29 VITALS
WEIGHT: 189.6 LBS | SYSTOLIC BLOOD PRESSURE: 137 MMHG | HEART RATE: 90 BPM | OXYGEN SATURATION: 96 % | TEMPERATURE: 97.7 F | HEIGHT: 74 IN | RESPIRATION RATE: 27 BRPM | BODY MASS INDEX: 24.33 KG/M2 | DIASTOLIC BLOOD PRESSURE: 81 MMHG

## 2024-11-29 DIAGNOSIS — I21.4 NSTEMI (NON-ST ELEVATED MYOCARDIAL INFARCTION) (H): ICD-10-CM

## 2024-11-29 DIAGNOSIS — I50.22 CHRONIC SYSTOLIC CONGESTIVE HEART FAILURE (H): ICD-10-CM

## 2024-11-29 DIAGNOSIS — R52 PAIN MANAGEMENT: ICD-10-CM

## 2024-11-29 DIAGNOSIS — R07.9 CHEST PAIN, UNSPECIFIED TYPE: ICD-10-CM

## 2024-11-29 DIAGNOSIS — R53.81 PHYSICAL DECONDITIONING: Primary | ICD-10-CM

## 2024-11-29 LAB
ANION GAP SERPL CALCULATED.3IONS-SCNC: 12 MMOL/L (ref 7–15)
APTT PPP: 34 SECONDS (ref 22–38)
BASOPHILS # BLD AUTO: 0.1 10E3/UL (ref 0–0.2)
BASOPHILS NFR BLD AUTO: 1 %
BUN SERPL-MCNC: 18.4 MG/DL (ref 8–23)
CALCIUM SERPL-MCNC: 8.4 MG/DL (ref 8.8–10.4)
CHLORIDE SERPL-SCNC: 106 MMOL/L (ref 98–107)
CREAT SERPL-MCNC: 1.53 MG/DL (ref 0.67–1.17)
EGFRCR SERPLBLD CKD-EPI 2021: 46 ML/MIN/1.73M2
EOSINOPHIL # BLD AUTO: 0.1 10E3/UL (ref 0–0.7)
EOSINOPHIL NFR BLD AUTO: 0 %
ERYTHROCYTE [DISTWIDTH] IN BLOOD BY AUTOMATED COUNT: 16.2 % (ref 10–15)
GLUCOSE SERPL-MCNC: 135 MG/DL (ref 70–99)
HCO3 SERPL-SCNC: 24 MMOL/L (ref 22–29)
HCT VFR BLD AUTO: 35.4 % (ref 40–53)
HGB BLD-MCNC: 10.9 G/DL (ref 13.3–17.7)
IMM GRANULOCYTES # BLD: 0.1 10E3/UL
IMM GRANULOCYTES NFR BLD: 1 %
INR PPP: 1.23 (ref 0.85–1.15)
LYMPHOCYTES # BLD AUTO: 1.2 10E3/UL (ref 0.8–5.3)
LYMPHOCYTES NFR BLD AUTO: 7 %
MCH RBC QN AUTO: 28.2 PG (ref 26.5–33)
MCHC RBC AUTO-ENTMCNC: 30.8 G/DL (ref 31.5–36.5)
MCV RBC AUTO: 92 FL (ref 78–100)
MONOCYTES # BLD AUTO: 1.2 10E3/UL (ref 0–1.3)
MONOCYTES NFR BLD AUTO: 7 %
NEUTROPHILS # BLD AUTO: 14.2 10E3/UL (ref 1.6–8.3)
NEUTROPHILS NFR BLD AUTO: 85 %
NRBC # BLD AUTO: 0 10E3/UL
NRBC BLD AUTO-RTO: 0 /100
PLATELET # BLD AUTO: 395 10E3/UL (ref 150–450)
POTASSIUM SERPL-SCNC: 4.3 MMOL/L (ref 3.4–5.3)
RBC # BLD AUTO: 3.86 10E6/UL (ref 4.4–5.9)
SODIUM SERPL-SCNC: 142 MMOL/L (ref 135–145)
TROPONIN T SERPL HS-MCNC: 86 NG/L
TROPONIN T SERPL HS-MCNC: 91 NG/L
WBC # BLD AUTO: 16.8 10E3/UL (ref 4–11)

## 2024-11-29 PROCEDURE — 93308 TTE F-UP OR LMTD: CPT

## 2024-11-29 PROCEDURE — 71275 CT ANGIOGRAPHY CHEST: CPT

## 2024-11-29 PROCEDURE — 85048 AUTOMATED LEUKOCYTE COUNT: CPT | Performed by: EMERGENCY MEDICINE

## 2024-11-29 PROCEDURE — 250N000011 HC RX IP 250 OP 636: Performed by: EMERGENCY MEDICINE

## 2024-11-29 PROCEDURE — 85004 AUTOMATED DIFF WBC COUNT: CPT | Performed by: EMERGENCY MEDICINE

## 2024-11-29 PROCEDURE — 36415 COLL VENOUS BLD VENIPUNCTURE: CPT | Performed by: EMERGENCY MEDICINE

## 2024-11-29 PROCEDURE — 84484 ASSAY OF TROPONIN QUANT: CPT | Performed by: EMERGENCY MEDICINE

## 2024-11-29 PROCEDURE — 82374 ASSAY BLOOD CARBON DIOXIDE: CPT | Performed by: EMERGENCY MEDICINE

## 2024-11-29 PROCEDURE — 99285 EMERGENCY DEPT VISIT HI MDM: CPT | Mod: 25

## 2024-11-29 PROCEDURE — 80048 BASIC METABOLIC PNL TOTAL CA: CPT | Performed by: EMERGENCY MEDICINE

## 2024-11-29 PROCEDURE — 99309 SBSQ NF CARE MODERATE MDM 30: CPT | Performed by: NURSE PRACTITIONER

## 2024-11-29 PROCEDURE — 85018 HEMOGLOBIN: CPT | Performed by: EMERGENCY MEDICINE

## 2024-11-29 PROCEDURE — 93005 ELECTROCARDIOGRAM TRACING: CPT | Performed by: EMERGENCY MEDICINE

## 2024-11-29 PROCEDURE — 85730 THROMBOPLASTIN TIME PARTIAL: CPT | Performed by: EMERGENCY MEDICINE

## 2024-11-29 PROCEDURE — 85610 PROTHROMBIN TIME: CPT | Performed by: EMERGENCY MEDICINE

## 2024-11-29 RX ORDER — IOPAMIDOL 755 MG/ML
75 INJECTION, SOLUTION INTRAVASCULAR ONCE
Status: COMPLETED | OUTPATIENT
Start: 2024-11-29 | End: 2024-11-29

## 2024-11-29 RX ADMIN — IOPAMIDOL 75 ML: 755 INJECTION, SOLUTION INTRAVENOUS at 10:29

## 2024-11-29 ASSESSMENT — ACTIVITIES OF DAILY LIVING (ADL)
ADLS_ACUITY_SCORE: 61

## 2024-11-29 NOTE — ED PROVIDER NOTES
EMERGENCY DEPARTMENT ENCOUNTER      NAME: Lance Ibrahim  AGE: 79 year old male  YOB: 1945  MRN: [id number]  EVALUATION DATE & TIME: 2024  9:03 AM    PCP: Rickey Adamson    ED PROVIDER: Aggie Chavez MD    Chief Complaint   Patient presents with    Chest Pain         FINAL IMPRESSION:  1. Chest pain, unspecified type          ED COURSE & MEDICAL DECISION MAKING:    Pertinent Labs & Imaging studies reviewed. (See chart for details)  79 year old male with history of HTN, HLD, DM, CHF status post recent non-STEMI with angiogram showing 80% left main, and total occlusion of the right status post three-vessel CABG on  here who presents to the Emergency Department for evaluation of episode of chest pressure this morning substernal that lasted for approximately 30 to 40 minutes and resolved after nitro x 1.  He is asymptomatic at this time.  EMS note that sats were 89% on room air, but he is 93-95 here.  Sternotomy is unremarkable.  Differential includes ACS, pericardial effusion, PE,  Atypical chest pain, musculoskeletal pain from recent sternotomy, pericarditis.    Patient met by myself upon EMS arrival, placed on monitor, IV established and blood obtained.  Twelve-lead EKG obtained showing sinus rhythm, no acute ischemic changes but poor data quality because of his artifact.  Bedside echo does not show any evidence of pericardial effusion, please see procedure note.  Patient is appropriately .  Had received aspirin prior to arrival.  CBC, BMP, coags, troponin notable for hemoglobin of 10.9 which is baseline.  Creatinine of 1.5 up slightly from 1.3 previous.  WBC is 16.8, new leukocytosis.  Troponin elevated at 91.  Hard to know what to make of this.  His most previous is pre-CABG.  CT PE study unremarkable for PE.  There is evidence of retrocardiac effusion as well as bilateral pleural effusion.  Case discussed with patient's cardiothoracic surgeon who was not concerned about the  retrocardiac effusion and stated that this is simply related to anticipated postoperative fluid after his chest tubes were removed.  She is reassured with ED workup and as long as troponin is downtrending is agreeable to hospital discharge.  Repeat troponin is 86, on recheck patient is still pain-free.  Findings were discussed with patient and family at bedside and they are comfortable with plan for TCU.      ED Course as of 11/29/24 1315   Fri Nov 29, 2024   0908 I met with the patient to obtain patient history and performed a physical exam. Discussed plan for ED work up including potential diagnostic studies and interventions.   0940 Hemoglobin(!): 10.9  Baseline   0940 WBC(!): 16.8  Up/new   0959 Creatinine(!): 1.53  Up slightly from 1.35 prev   1011 Troponin T, High Sensitivity(!): 91  Up from 49 most recent on the 16th (pre cabg)   1035 CT Chest Pulmonary Embolism w Contrast  CT independently interpreted by myself no visualized PE.  Bilateral effusions right greater than left   1052 Spoke w Dr. Evans, cvts   1129 Again with Dr. Evans regarding the fluid collection   1300 Troponin T, High Sensitivity(!): 86  down       Medical Decision Making  Obtained supplemental history:Supplemental history obtained?: EMS  Reviewed external records: External records reviewed?: Inpatient Record: 11/26/2024 discharge summary  Care impacted by chronic illness:Diabetes, Heart Disease, Hyperlipidemia, and Hypertension  Did you consider but not order tests?: Work up considered but not performed and documented in chart, if applicable  Did you interpret images independently?: Independent interpretation of ECG and images noted in documentation, when applicable.  Consultation discussion with other provider:Did you involve another provider (consultant, , pharmacy, etc.)?: I discussed the care with another health care provider, see documentation for details.  Discharge. No recommendations on prescription strength medication(s). I  "considered admission, but ultimately discharged patient after reassuring ED workup with serial cardiac enzymes and cardiothoracic surgery consultation.    MIPS: CT Pulmonary Angiogram:Patient is moderate to high risk for PE.      At the conclusion of the encounter I discussed the results of all of the tests and the disposition. The questions were answered. The patient or family acknowledged understanding and was agreeable with the care plan.    MEDICATIONS GIVEN IN THE EMERGENCY:  Medications   iopamidol (ISOVUE-370) solution 75 mL (75 mLs Intravenous $Given 11/29/24 1029)       NEW PRESCRIPTIONS STARTED AT TODAY'S ER VISIT  New Prescriptions    No medications on file          =================================================================    HPI    Patient information was obtained from: Patient and EMS    Use of Intrepreter: N/A       Lance Ibrahim is a 79 year old male with pertinent medical history of BPH, arthritis, CAD, PVD, cardiomyopathy, HTN, obesity, kidney stone, gouty arthropathy, chronic rhinitis, osteomyelitis, TIA, diabetes, HLD, CKD, anemia, CHF, presence of aortocoronary bypass graft, NSTEMI, and cellulitis who presents with chest pressure. Patient had a bypass 2 weeks ago and reports doing well post-op and in rehab. At 0600 this morning, patient endorsed chest pressure which lasted 30-40 minutes in total. He describes the chest pressure as \"a small elephant on my chest.\" He was given asa and nitroglycerin by EMS for the chest pressure with relief. During encounter, patient denies chest pressure currently. Patient denies lightheadedness, dizziness, drainage of test tube site, shortness of breath, nausea, vomiting, and sweating.      PAST MEDICAL HISTORY:  Past Medical History:   Diagnosis Date    Anemia     Arthritis     osteoarthritis knees    BPH     CAD (coronary artery disease)     subtotal occlusion in the small distal LAD     Cardiomyopathy     Cerebral artery occlusion with cerebral " infarction     TIA 1993, no residual    Cervicalgia     CHF (congestive heart failure) (H)     Chronic kidney disease     Chronic low back pain     Chronic rhinitis     Gouty arthropathy     hands    Hyperlipidemia     Hypertension     Kidney stone     Nocturia     Obesity     Osteomyelitis (H) 08/2023    Foot    PVD (peripheral vascular disease)     Sleep apnea     doesn't use cpap    Spinal cord stimulator status 04/2024    Spinal cord stimulator in place    TIA (transient ischaemic attack) 1993    Type 2 diabetes mellitus - 11/08/2018    Ureteral stone        PAST SURGICAL HISTORY:  Past Surgical History:   Procedure Laterality Date    AMPUTATE FOOT Right 08/07/2023    Procedure: AMPUTATION, right hallux;  Surgeon: Nakul Salazar DPM;  Location: [facility] OR    AMPUTATE TOE(S) Left 10/15/2018    Procedure: AMPUTATE TOE(S);  Left third toe amputation;  Surgeon: Ayaka Azevedo DPM, Podiatry/Foot and Ankle Surgery;  Location:  OR    ARTHROPLASTY KNEE Right 12/07/2018    Procedure: Right total knee arthroplasty;  Surgeon: Issa Cunha MD;  Location:  OR    COLONOSCOPY  03/09/2013    Procedure: COLONOSCOPY;  COLONOSCOPY;  Surgeon: Chau Hogan MD;  Location:  GI    CORONARY ARTERY BYPASS GRAFT, WITH ENDOSCOPIC VESSEL PROCUREMENT N/A 11/18/2024    Procedure: CORONARY ARTERY BYPASS GRAFT TIMES THREE, WITH LEFT INTERNAL MAMARARY ARTERY HARVEST, LEFT ENDOSCOPIC VESSEL PROCUREMENT, EPIAORTIC ULTRASOUND;  Surgeon: Tsering Evans MD;  Location: [facility] OR    CV CORONARY ANGIOGRAM N/A 11/14/2024    Procedure: Coronary Angiogram;  Surgeon: Talon Lew MD;  Location: [facility] CATH LAB CV    CV HEART CATHETERIZATION WITH POSSIBLE INTERVENTION Left 03/05/2019    Procedure: Coronary Angiogram;  Surgeon: Nas Linda MD;  Location:  HEART CARDIAC CATH LAB    CV INTRA AORTIC BALLOON N/A 11/16/2024    Procedure: Intra aortic Balloon Pump Insertion;  Surgeon: Jessica Yepez MD;   Location: [facility] CV    CV INTRAVASULAR ULTRASOUND N/A 11/14/2024    Procedure: Intravascular Ultrasound;  Surgeon: Talon Lew MD;  Location: [facility] CV    CV LEFT HEART CATH N/A 11/14/2024    Procedure: Left Heart Catheterization;  Surgeon: Talon Lew MD;  Location: [facility] CV    Diastasis recti repair  1985    ENDOSCOPIC RETROGRADE CHOLANGIOPANCREATOGRAM N/A 04/30/2024    Procedure: ENDOSCOPIC RETROGRADE CHOLANGIOPANCREATOGRAPHY, BILIARY SPHINCTEROTOMY, DILATION, BRUSHING, BIOPSIES with DISTAL EXTRA HEPATIC BILE DUCT STRICTURE;  Surgeon: Aldo Gongora MD;  Location: [facility] OR    ESOPHAGOSCOPY, GASTROSCOPY, DUODENOSCOPY (EGD), COMBINED N/A 04/30/2024    Procedure: ESOPHAGOGASTRODUODENOSCOPY, WITH ENDOSCOPIC ULTRASOUND GUIDANCE;  Surgeon: Aldo Gongora MD;  Location: [facility] OR    EXTRACAPSULAR CATARACT EXTRATION WITH INTRAOCULAR LENS IMPLANT Left 03/13/2017    EXTRACAPSULAR CATARACT EXTRATION WITH INTRAOCULAR LENS IMPLANT Right     FOOT SURGERY  04/2013    cyst removal     HERNIA REPAIR  07/13/2004    ventral     IR DISC ASPIRATION INJECTION  6/19/2024    IRRIGATION AND DEBRIDEMENT SHOULDER, COMBINED Right 8/7/2024    Procedure: IRRIGATION AND DEBRIDEMENT, SHOULDER;  Surgeon: Terence Hanson MD;  Location: [facility] OR    IRRIGATION AND DEBRIDEMENT UPPER EXTREMITY, COMBINED Right 8/7/2024    Procedure: IRRIGATION AND DEBRIDEMENT, ELBOW AND THUMB;  Surgeon: Terence Hanson MD;  Location: [facility] OR    ORIF Shoulder Left     PROSTATE SURGERY  02/2024    Urolift    RESECT CLAVICLE DISTAL Right 8/7/2024    Procedure: EXCISION, CLAVICLE, DISTAL;  Surgeon: Terence Hanson MD;  Location: [facility] OR    SPINAL CORD STIMULATOR IMPLANT  04/03/2024    TRANSESOPHAGEAL ECHOCARDIOGRAM INTRAOPERATIVE N/A 11/18/2024    Procedure: ECHOCARDIOGRAM, TRANSESOPHAGEAL, INTRAOPERATIVE;  Surgeon: Tsering Evans MD;   Location: [facility] OR    Ventral hernia repair NOS  1987       CURRENT MEDICATIONS:    Prior to Admission Medications   Prescriptions Last Dose Informant Patient Reported? Taking?   Continuous Glucose  (DEXCOM G7 ) AGUS   No No   Sig: Use to read blood sugars as per 's instructions.   Continuous Glucose Sensor (DEXCOM G7 SENSOR) MISC   No No   Sig: Change every 10 days.   White Petrolatum-Mineral Oil (LUBRIFRESH P.M.) OINT   No No   Sig: Apply 1 g to eye 2 times daily as needed for dry eyes.   acetaminophen (TYLENOL) 325 MG tablet   No No   Sig: Take 2 tablets (650 mg) by mouth every 4 hours as needed for other (For optimal non-opioid multimodal pain management to improve pain control.).   allopurinol (ZYLOPRIM) 100 MG tablet   Yes No   Sig: Take 100 mg by mouth daily.   aspirin (ASA) 81 MG chewable tablet   No No   Sig: Take 2 tablets (162 mg) by mouth daily.   atorvastatin (LIPITOR) 40 MG tablet   No No   Sig: Take 1 tablet (40 mg) by mouth every evening.   bumetanide (BUMEX) 1 MG tablet   No No   Sig: Take 1 tablet (1 mg) by mouth daily.   calcium carbonate (TUMS) 500 MG chewable tablet   Yes No   Sig: Take 1,000 mg by mouth 3 times daily as needed for heartburn   ferrous gluconate (FERGON) 324 (38 Fe) MG tablet   No No   Sig: Take 1 tablet (324 mg) by mouth daily   finasteride (PROSCAR) 5 MG tablet   Yes No   Sig: Take 5 mg by mouth daily   gabapentin (NEURONTIN) 300 MG capsule   No No   Sig: Take 1 capsule (300 mg) by mouth 3 times daily as needed.   glipiZIDE (GLUCOTROL XL) 2.5 MG 24 hr tablet   No No   Sig: Take 1 tablet (2.5 mg) by mouth daily.   insulin glargine (LANTUS PEN) 100 UNIT/ML pen   No No   Sig: Inject 10 Units subcutaneously 2 times daily.   melatonin 5 MG tablet   No No   Sig: Take 1 tablet (5 mg) by mouth nightly as needed for sleep.   metoprolol succinate ER (TOPROL XL) 100 MG 24 hr tablet   No No   Sig: Take 1 tablet (100 mg) by mouth daily.   pantoprazole  "(PROTONIX) 40 MG EC tablet   No No   Sig: Take 1 tablet (40 mg) by mouth every morning (before breakfast).   sodium bicarbonate 650 MG tablet   No No   Sig: Take 3 tablets (1,950 mg) by mouth 3 times daily.   spironolactone (ALDACTONE) 25 MG tablet   No No   Sig: Take 1 tablet (25 mg) by mouth daily.   ursodiol (ACTIGALL) 300 MG capsule   No No   Sig: Take 1 capsule (300 mg) by mouth 2 times daily      Facility-Administered Medications: None       ALLERGIES:  Allergies   Allergen Reactions    Ancef [Cefazolin] Rash    Cephalexin Itching and Rash     Allergic reaction required hospitalization 2024    Penicillins Anaphylaxis    Sulfa Antibiotics      \"itchy rash, swelling of face and hands\"       FAMILY HISTORY:  Family History   Problem Relation Age of Onset    Family History Negative Mother          88 yo    Cancer Father          74 yo brain    Diabetes Maternal Grandfather          91 yo    Alcohol/Drug Paternal Grandfather             Colon Cancer No family hx of        SOCIAL HISTORY:  Social History     Tobacco Use    Smoking status: Former     Current packs/day: 0.00     Types: Cigarettes     Quit date: 3/17/1993     Years since quittin.7     Passive exposure: Never    Smokeless tobacco: Never   Vaping Use    Vaping status: Never Used   Substance Use Topics    Alcohol use: Not Currently    Drug use: No        VITALS:  Patient Vitals for the past 24 hrs:   BP Temp Temp src Pulse Resp SpO2   24 1300 131/81 -- -- 89 25 --   24 1214 -- -- -- 87 25 96 %   24 1200 (!) 146/86 -- -- 89 26 95 %   24 1145 (!) 148/77 -- -- 86 23 96 %   24 1130 (!) 149/72 -- -- 87 27 93 %   24 1116 (!) 155/81 -- -- 88 27 96 %   24 1100 (!) 174/92 -- -- 102 30 91 %   24 1045 138/80 -- -- 81 22 94 %   24 1030 (!) 146/77 -- -- 90 (!) 38 93 %   24 1000 (!) 143/79 -- -- 89 23 95 %   24 0915 (!) 150/88 -- -- 91 16 93 %   24 0907 (!) " 142/85 97.7  F (36.5  C) Oral 93 18 93 %       PHYSICAL EXAM    General Appearance: Well-appearing, well-nourished, no acute distress. Pale.  Head:  Normocephalic  Eyes:  conjunctiva/corneas clear  ENT:   membranes are moist without pallor  Neck:  Supple  Chest: Sternotomy intact, and appears to be well-healing.  No erythema, drainage.  Tenderness to palpation of the sternotomy area, as anticipated.  Cardio:  Regular rate and rhythm, no murmur/gallop/rub, 2+ pulses symmetric in all extremities  Pulm:  No respiratory distress, slightly decreased in bilateral bases  Back:  No CVA tenderness, normal ROM  Abdomen:  Soft, non-tender, non distended,no rebound or guarding. Abdomen is obese  Extremities: No peripheral edema  Skin:  Skin warm, dry, no rashes  Neuro:  Alert and oriented ×3     RADIOLOGY/LABS:  Reviewed all pertinent imaging. Please see official radiology report. All pertinent labs reviewed and interpreted.    Results for orders placed or performed during the hospital encounter of 11/29/24   POC US ECHO LIMITED    Impression    Limited bedside echo performed by myself, no evidence of pericardial effusion   CT Chest Pulmonary Embolism w Contrast    Impression    IMPRESSION:  1.  No pulmonary artery embolism.  2.  Status post open heart surgery with a 6.3 cm complex retrosternal fluid collection. This is favored to represent a postoperative hematoma. However, the sterility of this fluid collection cannot be assessed and if there is clinical concern for a   developing abscess, at least short interval follow-up is recommended.  3.  Mildly pneumomediastinum which is likely postoperative related.  4.  Mild pulmonary edema.  5.  Small left-sided and small-to-moderate right-sided pleural effusions.   Basic metabolic panel   Result Value Ref Range    Sodium 142 135 - 145 mmol/L    Potassium 4.3 3.4 - 5.3 mmol/L    Chloride 106 98 - 107 mmol/L    Carbon Dioxide (CO2) 24 22 - 29 mmol/L    Anion Gap 12 7 - 15 mmol/L     Urea Nitrogen 18.4 8.0 - 23.0 mg/dL    Creatinine 1.53 (H) 0.67 - 1.17 mg/dL    GFR Estimate 46 (L) >60 mL/min/1.73m2    Calcium 8.4 (L) 8.8 - 10.4 mg/dL    Glucose 135 (H) 70 - 99 mg/dL   Result Value Ref Range    Troponin T, High Sensitivity 91 (H) <=22 ng/L   Result Value Ref Range    INR 1.23 (H) 0.85 - 1.15   Partial thromboplastin time   Result Value Ref Range    aPTT 34 22 - 38 Seconds   CBC with platelets and differential   Result Value Ref Range    WBC Count 16.8 (H) 4.0 - 11.0 10e3/uL    RBC Count 3.86 (L) 4.40 - 5.90 10e6/uL    Hemoglobin 10.9 (L) 13.3 - 17.7 g/dL    Hematocrit 35.4 (L) 40.0 - 53.0 %    MCV 92 78 - 100 fL    MCH 28.2 26.5 - 33.0 pg    MCHC 30.8 (L) 31.5 - 36.5 g/dL    RDW 16.2 (H) 10.0 - 15.0 %    Platelet Count 395 150 - 450 10e3/uL    % Neutrophils 85 %    % Lymphocytes 7 %    % Monocytes 7 %    % Eosinophils 0 %    % Basophils 1 %    % Immature Granulocytes 1 %    NRBCs per 100 WBC 0 <1 /100    Absolute Neutrophils 14.2 (H) 1.6 - 8.3 10e3/uL    Absolute Lymphocytes 1.2 0.8 - 5.3 10e3/uL    Absolute Monocytes 1.2 0.0 - 1.3 10e3/uL    Absolute Eosinophils 0.1 0.0 - 0.7 10e3/uL    Absolute Basophils 0.1 0.0 - 0.2 10e3/uL    Absolute Immature Granulocytes 0.1 <=0.4 10e3/uL    Absolute NRBCs 0.0 10e3/uL   Result Value Ref Range    Troponin T, High Sensitivity 86 (H) <=22 ng/L       EKG:  Performed at: 11/29/2024 0921    Impression: sinus rhythm with premature supraventricular complexes. Left axis deviation. Pulmonary disease pattern. T wave abnormality, consider lateral ischemia. Poor date quality of artifact. Abnormal ECG.    Rate: 92  Rhythm: sinus  Axis: -32  NJ Interval: 190  QRS Interval: 112  QTc Interval: 457  ST Changes: none.  Comparison: when compared with ECG of 11/29/2024 0912, premature supraventricular complexes are now present.  I have independently reviewed and interpreted the EKG(s) documented above.    PROCEDURES:  POC US ECHO LIMITED    Date/Time: 11/29/2024 9:47  AM    Performed by: Aggie Chavez MD  Authorized by: Aggie Chavez MD    Comments:      Limited bedside echo performed by myself, no evidence of pericardial effusion        The creation of this record is based on the scribe s observations of the work being performed by Aggie Chavez MD and the provider s statements to them. It was created on her behalf by Maritza Suarez, a trained medical scribe. This document has been checked and approved by the attending provider.    Aggie Chavez MD  Emergency Medicine  [facility] EMERGENCY DEPARTMENT  [address] [place] [id number] [phone]  Dept: [phone]     Aggie Chavez MD  11/29/24 7075

## 2024-11-29 NOTE — LETTER
11/29/2024      Lance Ibrahim  [address]  [facility] [address]        Formerly [facility], [facility]–1983 GERIATRIC SERVICES  Chief Complaint   Patient presents with     [facility] F/U     [facility] Record Number:  [id number]  Place of Service where encounter took place:  [facility] ([facility]) [[id number]]  Code Status:  Full Code     HISTORY:      HPI:  Lance Ibrahim  is 79 year old (1945) undergoing physical and occupational therapy.    He is with past medical history significant for HFpEF, diabetes, gout,  Excerpted from records  who presented with a STEMI on 11/14/24.  Subsequent coronary angiography revealed very tight left main disease and a totally occluded RCA. Over the weekend he had chest pain at rest and an intra-aortic balloon pump was placed. He was referred to CV surgery for evaluation for possible coronary artery revascularization.      Patient was deemed a candidate for coronary artery bypass surgery, and was taken to the operating room on 11/18/24 where patient underwent 3 vessel coronary artery bypass and endoscopic vein harvest from the left leg. Surgery was uneventful and patient was brought to the ICU post-operatively.IABP was removed POD#1. He was extubated on POD#2 and weaned from pressors. Patient was awake and alert, afebrile, and with stable vitals. Insulin drip was discontinued and he was transitioned to a sliding scale. He was transferred to general telemetry status on POD#4 where patient has had return of bowel function, is maintaining oxygen saturations on room air, had his chest tubes removed, and has no complaints of chest pain or shortness of breath. On 11/26/24, patient was stable enough to be discharged to TCU.     Of note, postop echo showed EF 25%, therefore a Lifevest was ordered and place on pt.before discharge to TCU. Lifevest ordered for 3 mos or until heart failure discontinues it. Heart failure was consulted and appropriate medications were prescribed with follow up  "appointments.     Today he is seen to review VS, routine visit and to reestablish care.  He denied chest painhe however did report having chest pressure.  He is status post CABG x 3.  He tells writer that it feels like an elephant sitting on his cath in place based with, \"just a small elephant \".  He denied pain radiating to the neck or arm.  Temperature 99.3 with a pulse of 102 respiratory rate 22.  Currently wearing a LifeVest.  Per his report he has not walked in months due to bone-on-bone left knee.  He does rate his pain 4 out of 10 and gets relief with pain medication.  He was sent to the ER for further evaluation.      ALLERGIES:Ancef [cefazolin], Cephalexin, Penicillins, and Sulfa antibiotics    PAST MEDICAL HISTORY:   Past Medical History:   Diagnosis Date     Anemia      Arthritis     osteoarthritis knees     BPH      CAD (coronary artery disease)     subtotal occlusion in the small distal LAD      Cardiomyopathy      Cerebral artery occlusion with cerebral infarction     TIA 1993, no residual     Cervicalgia      CHF (congestive heart failure) (H)      Chronic kidney disease      Chronic low back pain      Chronic rhinitis      Gouty arthropathy     hands     Hyperlipidemia      Hypertension      Kidney stone      Nocturia      Obesity      Osteomyelitis (H) 08/2023    Foot     PVD (peripheral vascular disease)      Sleep apnea     doesn't use cpap     Spinal cord stimulator status 04/2024    Spinal cord stimulator in place     TIA (transient ischaemic attack) 1993     Type 2 diabetes mellitus - 11/08/2018     Ureteral stone        PAST SURGICAL HISTORY:   has a past surgical history that includes Diastasis recti repair (1985); Ventral hernia repair NOS (1987); ORIF Shoulder (Left); Colonoscopy (03/09/2013); hernia repair (07/13/2004); Foot surgery (04/2013); Amputate toe(s) (Left, 10/15/2018); Arthroplasty knee (Right, 12/07/2018); Heart Catheterization with Possible Intervention (Left, 03/05/2019); " Extracapsular cataract extration with intraocular lens implant (Left, 03/13/2017); Extracapsular cataract extration with intraocular lens implant (Right); Amputate foot (Right, 08/07/2023); Esophagoscopy, gastroscopy, duodenoscopy (EGD), combined (N/A, 04/30/2024); Endoscopic retrograde cholangiopancreatogram (N/A, 04/30/2024); Spinal Cord Stimulator Implant (04/03/2024); Prostate surgery (02/2024); IR Disc Aspriation Injection (6/19/2024); Irrigation and debridement shoulder, combined (Right, 8/7/2024); Resect clavicle distal (Right, 8/7/2024); Irrigation and debridement upper extremity, combined (Right, 8/7/2024); Coronary Angiogram (N/A, 11/14/2024); Intravascular Ultrasound (N/A, 11/14/2024); Left Heart Catheterization (N/A, 11/14/2024); Intra aortic Balloon Pump Insertion (N/A, 11/16/2024); Coronary Artery Bypass Graft, With Endoscopic Vessel Procurement (N/A, 11/18/2024); and Transesophageal echocardiogram intraoperative (N/A, 11/18/2024).    FAMILY HISTORY: family history includes Alcohol/Drug in his paternal grandfather; Cancer in his father; Diabetes in his maternal grandfather; Family History Negative in his mother.    SOCIAL HISTORY:  reports that he quit smoking about 31 years ago. His smoking use included cigarettes. He has never been exposed to tobacco smoke. He has never used smokeless tobacco. He reports that he does not currently use alcohol. He reports that he does not use drugs.    ROS:  Constitutional: Negative for activity change, appetite change, fatigue and fever. Hasn't ambulated in months due to bone on bone left knee   HENT: Negative for congestion.    Respiratory: Negative for cough, shortness of breath and wheezing.    Cardiovascular: Negative for chest pain and leg swelling. Chest pressure   Gastrointestinal: Negative for abdominal distention, abdominal pain, constipation, diarrhea and nausea.   Genitourinary: Negative for dysuria.   Musculoskeletal:   Skin: Negative for color change and  wound.  Midline surgical incision, surgical wounds left medial leg   Neurological: Negative for dizziness.   Psychiatric/Behavioral: Negative for agitation, behavioral problems and confusion.     Physical Exam:  Constitutional:       Appearance: Patient is well-developed.   HENT:      Head: Normocephalic.   Eyes:      Conjunctiva/sclera: Conjunctivae normal.   Neck:      Musculoskeletal: Normal range of motion.   Cardiovascular:      Rate and Rhythm: Normal rate and regular rhythm.      Heart sounds: Normal heart sounds. No murmur. Wearing a life vest   Pulmonary:      Effort: No respiratory distress.      Breath sounds: Normal breath sounds.   no wheezing or rales.   Abdominal:      General: Bowel sounds are normal. There is no distension.      Palpations: Abdomen is soft.      Tenderness: There is no abdominal tenderness.   Musculoskeletal:       Normal range of motion.  not ambulating per his report   Skin:General:        Skin is warm. Midline chest incision C/D/I TANVI, medial left leg incision   Neurological:         Mental Status: Patient is alert and oriented to person, place, and time.   Psychiatric:         Behavior: Behavior normal.     Vitals:/82   Pulse 102   Temp 99.3  F (37.4  C)   Resp 22   Ht 1.829 m (6')   Wt 87.9 kg (193 lb 12.8 oz)   SpO2 95%   BMI 26.28 kg/m   and Body mass index is 26.28 kg/m .    Lab/Diagnostic data:   Recent Results (from the past 240 hours)   Glucose by meter    Collection Time: 11/22/24  8:48 PM   Result Value Ref Range    GLUCOSE BY METER POCT 158 (H) 70 - 99 mg/dL   Ionized Calcium    Collection Time: 11/23/24  5:39 AM   Result Value Ref Range    Calcium Ionized Whole Blood 4.4 4.4 - 5.2 mg/dL   Phosphorus    Collection Time: 11/23/24  5:39 AM   Result Value Ref Range    Phosphorus 2.3 (L) 2.5 - 4.5 mg/dL   Magnesium    Collection Time: 11/23/24  5:39 AM   Result Value Ref Range    Magnesium 2.1 1.7 - 2.3 mg/dL   Potassium    Collection Time: 11/23/24  5:39 AM    Result Value Ref Range    Potassium 3.7 3.4 - 5.3 mmol/L   Extra Purple Top EDTA (LAB USE ONLY)    Collection Time: 11/23/24  5:39 AM   Result Value Ref Range    Hold Specimen JIC    CBC with platelets    Collection Time: 11/23/24  5:39 AM   Result Value Ref Range    WBC Count 10.7 4.0 - 11.0 10e3/uL    RBC Count 3.83 (L) 4.40 - 5.90 10e6/uL    Hemoglobin 10.9 (L) 13.3 - 17.7 g/dL    Hematocrit 35.4 (L) 40.0 - 53.0 %    MCV 92 78 - 100 fL    MCH 28.5 26.5 - 33.0 pg    MCHC 30.8 (L) 31.5 - 36.5 g/dL    RDW 16.2 (H) 10.0 - 15.0 %    Platelet Count 306 150 - 450 10e3/uL   Basic metabolic panel    Collection Time: 11/23/24  5:39 AM   Result Value Ref Range    Sodium 145 135 - 145 mmol/L    Potassium 3.7 3.4 - 5.3 mmol/L    Chloride 108 (H) 98 - 107 mmol/L    Carbon Dioxide (CO2) 25 22 - 29 mmol/L    Anion Gap 12 7 - 15 mmol/L    Urea Nitrogen 35.9 (H) 8.0 - 23.0 mg/dL    Creatinine 1.55 (H) 0.67 - 1.17 mg/dL    GFR Estimate 45 (L) >60 mL/min/1.73m2    Calcium 8.2 (L) 8.8 - 10.4 mg/dL    Glucose 103 (H) 70 - 99 mg/dL   Glucose by meter    Collection Time: 11/23/24  7:39 AM   Result Value Ref Range    GLUCOSE BY METER POCT 113 (H) 70 - 99 mg/dL   Glucose by meter    Collection Time: 11/23/24 12:04 PM   Result Value Ref Range    GLUCOSE BY METER POCT 175 (H) 70 - 99 mg/dL   Ionized Calcium    Collection Time: 11/23/24  4:04 PM   Result Value Ref Range    Calcium Ionized Whole Blood 4.4 4.4 - 5.2 mg/dL   Glucose by meter    Collection Time: 11/23/24  5:15 PM   Result Value Ref Range    GLUCOSE BY METER POCT 135 (H) 70 - 99 mg/dL   Glucose by meter    Collection Time: 11/23/24  8:52 PM   Result Value Ref Range    GLUCOSE BY METER POCT 186 (H) 70 - 99 mg/dL   Ionized Calcium    Collection Time: 11/23/24 10:37 PM   Result Value Ref Range    Calcium Ionized Whole Blood 4.5 4.4 - 5.2 mg/dL   Magnesium    Collection Time: 11/24/24  5:51 AM   Result Value Ref Range    Magnesium 2.1 1.7 - 2.3 mg/dL   Potassium    Collection Time:  11/24/24  5:51 AM   Result Value Ref Range    Potassium 3.8 3.4 - 5.3 mmol/L   Extra Purple Top EDTA (LAB USE ONLY)    Collection Time: 11/24/24  5:51 AM   Result Value Ref Range    Hold Specimen JIC    Glucose by meter    Collection Time: 11/24/24  7:26 AM   Result Value Ref Range    GLUCOSE BY METER POCT 153 (H) 70 - 99 mg/dL   Echocardiogram Limited    Collection Time: 11/24/24 11:45 AM   Result Value Ref Range    LVEF  20-25% (severely reduced)    Glucose by meter    Collection Time: 11/24/24 12:26 PM   Result Value Ref Range    GLUCOSE BY METER POCT 144 (H) 70 - 99 mg/dL   Glucose by meter    Collection Time: 11/24/24  4:37 PM   Result Value Ref Range    GLUCOSE BY METER POCT 194 (H) 70 - 99 mg/dL   Glucose by meter    Collection Time: 11/24/24  9:04 PM   Result Value Ref Range    GLUCOSE BY METER POCT 165 (H) 70 - 99 mg/dL   Magnesium    Collection Time: 11/25/24  4:33 AM   Result Value Ref Range    Magnesium 2.3 1.7 - 2.3 mg/dL   Potassium    Collection Time: 11/25/24  4:33 AM   Result Value Ref Range    Potassium 3.9 3.4 - 5.3 mmol/L   Extra Purple Top EDTA (LAB USE ONLY)    Collection Time: 11/25/24  4:33 AM   Result Value Ref Range    Hold Specimen JIC    Glucose by meter    Collection Time: 11/25/24  7:57 AM   Result Value Ref Range    GLUCOSE BY METER POCT 163 (H) 70 - 99 mg/dL   Glucose by meter    Collection Time: 11/25/24 11:51 AM   Result Value Ref Range    GLUCOSE BY METER POCT 208 (H) 70 - 99 mg/dL   Glucose by meter    Collection Time: 11/25/24  5:14 PM   Result Value Ref Range    GLUCOSE BY METER POCT 140 (H) 70 - 99 mg/dL   Glucose by meter    Collection Time: 11/25/24  9:07 PM   Result Value Ref Range    GLUCOSE BY METER POCT 167 (H) 70 - 99 mg/dL   Glucose by meter    Collection Time: 11/26/24  2:21 AM   Result Value Ref Range    GLUCOSE BY METER POCT 136 (H) 70 - 99 mg/dL   Potassium    Collection Time: 11/26/24  4:22 AM   Result Value Ref Range    Potassium 3.6 3.4 - 5.3 mmol/L   Magnesium     Collection Time: 11/26/24  4:22 AM   Result Value Ref Range    Magnesium 2.2 1.7 - 2.3 mg/dL   Basic metabolic panel    Collection Time: 11/26/24  4:22 AM   Result Value Ref Range    Sodium 141 135 - 145 mmol/L    Potassium 3.6 3.4 - 5.3 mmol/L    Chloride 105 98 - 107 mmol/L    Carbon Dioxide (CO2) 26 22 - 29 mmol/L    Anion Gap 10 7 - 15 mmol/L    Urea Nitrogen 24.3 (H) 8.0 - 23.0 mg/dL    Creatinine 1.35 (H) 0.67 - 1.17 mg/dL    GFR Estimate 53 (L) >60 mL/min/1.73m2    Calcium 8.3 (L) 8.8 - 10.4 mg/dL    Glucose 128 (H) 70 - 99 mg/dL   Glucose by meter    Collection Time: 11/26/24  7:37 AM   Result Value Ref Range    GLUCOSE BY METER POCT 160 (H) 70 - 99 mg/dL   Glucose by meter    Collection Time: 11/26/24 11:15 AM   Result Value Ref Range    GLUCOSE BY METER POCT 169 (H) 70 - 99 mg/dL   Glucose by meter    Collection Time: 11/26/24  4:06 PM   Result Value Ref Range    GLUCOSE BY METER POCT 82 70 - 99 mg/dL   ECG 12-LEAD WITH MUSE (LHE)    Collection Time: 11/29/24  9:21 AM   Result Value Ref Range    Systolic Blood Pressure 150 mmHg    Diastolic Blood Pressure 88 mmHg    Ventricular Rate 92 BPM    Atrial Rate 92 BPM    MT Interval 190 ms    QRS Duration 112 ms     ms    QTc 457 ms    P Axis 87 degrees    R AXIS -32 degrees    T Axis 102 degrees    Interpretation ECG       Sinus rhythm with Premature supraventricular complexes  Left axis deviation  Pulmonary disease pattern  T wave abnormality, consider lateral ischemia  Abnormal ECG  When compared with ECG of 29-Nov-2024 09:12,  Premature supraventricular complexes are now Present  Confirmed by SEE ED PROVIDER NOTE FOR, ECG INTERPRETATION ([id number]),  BESSIE CONDON ([id number]) on 12/1/2024 12:02:36 AM     Basic metabolic panel    Collection Time: 11/29/24  9:30 AM   Result Value Ref Range    Sodium 142 135 - 145 mmol/L    Potassium 4.3 3.4 - 5.3 mmol/L    Chloride 106 98 - 107 mmol/L    Carbon Dioxide (CO2) 24 22 - 29 mmol/L    Anion Gap 12 7 - 15  mmol/L    Urea Nitrogen 18.4 8.0 - 23.0 mg/dL    Creatinine 1.53 (H) 0.67 - 1.17 mg/dL    GFR Estimate 46 (L) >60 mL/min/1.73m2    Calcium 8.4 (L) 8.8 - 10.4 mg/dL    Glucose 135 (H) 70 - 99 mg/dL   Troponin T, High Sensitivity    Collection Time: 11/29/24  9:30 AM   Result Value Ref Range    Troponin T, High Sensitivity 91 (H) <=22 ng/L   INR    Collection Time: 11/29/24  9:30 AM   Result Value Ref Range    INR 1.23 (H) 0.85 - 1.15   Partial thromboplastin time    Collection Time: 11/29/24  9:30 AM   Result Value Ref Range    aPTT 34 22 - 38 Seconds   CBC with platelets and differential    Collection Time: 11/29/24  9:30 AM   Result Value Ref Range    WBC Count 16.8 (H) 4.0 - 11.0 10e3/uL    RBC Count 3.86 (L) 4.40 - 5.90 10e6/uL    Hemoglobin 10.9 (L) 13.3 - 17.7 g/dL    Hematocrit 35.4 (L) 40.0 - 53.0 %    MCV 92 78 - 100 fL    MCH 28.2 26.5 - 33.0 pg    MCHC 30.8 (L) 31.5 - 36.5 g/dL    RDW 16.2 (H) 10.0 - 15.0 %    Platelet Count 395 150 - 450 10e3/uL    % Neutrophils 85 %    % Lymphocytes 7 %    % Monocytes 7 %    % Eosinophils 0 %    % Basophils 1 %    % Immature Granulocytes 1 %    NRBCs per 100 WBC 0 <1 /100    Absolute Neutrophils 14.2 (H) 1.6 - 8.3 10e3/uL    Absolute Lymphocytes 1.2 0.8 - 5.3 10e3/uL    Absolute Monocytes 1.2 0.0 - 1.3 10e3/uL    Absolute Eosinophils 0.1 0.0 - 0.7 10e3/uL    Absolute Basophils 0.1 0.0 - 0.2 10e3/uL    Absolute Immature Granulocytes 0.1 <=0.4 10e3/uL    Absolute NRBCs 0.0 10e3/uL   Troponin T, High Sensitivity    Collection Time: 11/29/24 12:08 PM   Result Value Ref Range    Troponin T, High Sensitivity 86 (H) <=22 ng/L   Glucose (OUTREACH)    Collection Time: 12/02/24  9:44 AM   Result Value Ref Range    Glucose 202 (H) 70 - 99 mg/dL   Basic Metabolic Panel No Glucose (OUTREACH)    Collection Time: 12/02/24  9:44 AM   Result Value Ref Range    Sodium 140 135 - 145 mmol/L    Potassium 4.2 3.4 - 5.3 mmol/L    Chloride 103 98 - 107 mmol/L    Carbon Dioxide (CO2) 21 (L) 22 -  29 mmol/L    Anion Gap 16 (H) 7 - 15 mmol/L    Urea Nitrogen 23.8 (H) 8.0 - 23.0 mg/dL    Creatinine 1.39 (H) 0.67 - 1.17 mg/dL    GFR Estimate 51 (L) >60 mL/min/1.73m2    Calcium 8.7 (L) 8.8 - 10.4 mg/dL   CBC with platelets and differential    Collection Time: 12/02/24  9:44 AM   Result Value Ref Range    WBC Count 13.2 (H) 4.0 - 11.0 10e3/uL    RBC Count 3.78 (L) 4.40 - 5.90 10e6/uL    Hemoglobin 10.7 (L) 13.3 - 17.7 g/dL    Hematocrit 35.5 (L) 40.0 - 53.0 %    MCV 94 78 - 100 fL    MCH 28.3 26.5 - 33.0 pg    MCHC 30.1 (L) 31.5 - 36.5 g/dL    RDW 16.0 (H) 10.0 - 15.0 %    Platelet Count 443 150 - 450 10e3/uL    % Neutrophils 81 %    % Lymphocytes 10 %    % Monocytes 7 %    % Eosinophils 1 %    % Basophils 1 %    % Immature Granulocytes 1 %    NRBCs per 100 WBC 0 <1 /100    Absolute Neutrophils 10.6 (H) 1.6 - 8.3 10e3/uL    Absolute Lymphocytes 1.3 0.8 - 5.3 10e3/uL    Absolute Monocytes 0.9 0.0 - 1.3 10e3/uL    Absolute Eosinophils 0.2 0.0 - 0.7 10e3/uL    Absolute Basophils 0.1 0.0 - 0.2 10e3/uL    Absolute Immature Granulocytes 0.1 <=0.4 10e3/uL    Absolute NRBCs 0.0 10e3/uL        MEDICATIONS:  MED REC REQUIRED  Post Medication Reconciliation Status: discharge medications reconciled, continue medications without change          Review of your medicines            Accurate as of November 29, 2024 11:59 PM. If you have any questions, ask your nurse or doctor.                CONTINUE these medicines which may have CHANGED, or have new prescriptions. If we are uncertain of the size of tablets/capsules you have at home, strength may be listed as something that might have changed.        Dose / Directions   Ferrous Gluconate 324 (37.5 Fe) MG Tabs  This may have changed: Another medication with the same name was removed. Continue taking this medication, and follow the directions you see here.  Changed by: Darling Valdivia      Dose: 1 tablet  Take 1 tablet by mouth daily.  Refills: 0            CONTINUE these medicines  which have NOT CHANGED        Dose / Directions   acetaminophen 325 MG tablet  Commonly known as: TYLENOL  Used for: S/P CABG x 3      Dose: 650 mg  Take 2 tablets (650 mg) by mouth every 4 hours as needed for other (For optimal non-opioid multimodal pain management to improve pain control.).  Refills: 0     allopurinol 100 MG tablet  Commonly known as: ZYLOPRIM      Dose: 100 mg  Take 100 mg by mouth daily.  Refills: 0     aspirin 81 MG chewable tablet  Commonly known as: ASA  Used for: S/P CABG x 3      Dose: 162 mg  Take 2 tablets (162 mg) by mouth daily.  Refills: 0     atorvastatin 40 MG tablet  Commonly known as: LIPITOR  Used for: S/P CABG x 3      Dose: 40 mg  Take 1 tablet (40 mg) by mouth every evening.  Refills: 0     bumetanide 1 MG tablet  Commonly known as: BUMEX  Used for: S/P CABG x 3      Dose: 1 mg  Take 1 tablet (1 mg) by mouth daily.  Refills: 0     finasteride 5 MG tablet  Commonly known as: PROSCAR      Dose: 5 mg  Take 5 mg by mouth daily  Refills: 0     gabapentin 300 MG capsule  Commonly known as: NEURONTIN  Used for: S/P CABG x 3      Dose: 300 mg  Take 1 capsule (300 mg) by mouth 3 times daily as needed.  Refills: 0     glipiZIDE 2.5 MG 24 hr tablet  Commonly known as: GLUCOTROL XL  Used for: S/P CABG x 3      Dose: 2.5 mg  Take 1 tablet (2.5 mg) by mouth daily.  Refills: 0     insulin glargine 100 UNIT/ML pen  Commonly known as: LANTUS PEN  Used for: S/P CABG x 3      Dose: 10 Units  Inject 10 Units subcutaneously 2 times daily.  Refills: 0     LubriFresh P.M. Oint  Used for: S/P CABG x 3      Dose: 1 Application  Apply 1 g to eye 2 times daily as needed for dry eyes.  Refills: 0     melatonin 5 MG tablet  Used for: S/P CABG x 3      Dose: 5 mg  Take 1 tablet (5 mg) by mouth nightly as needed for sleep.  Refills: 0     metoprolol succinate  MG 24 hr tablet  Commonly known as: TOPROL XL  Used for: S/P CABG x 3      Dose: 100 mg  Take 1 tablet (100 mg) by mouth daily.  Refills: 0      pantoprazole 40 MG EC tablet  Commonly known as: PROTONIX  Used for: S/P CABG x 3      Dose: 40 mg  Take 1 tablet (40 mg) by mouth every morning (before breakfast).  Refills: 0     sodium bicarbonate 650 MG tablet  Used for: S/P CABG x 3      Dose: 1,950 mg  Take 3 tablets (1,950 mg) by mouth 3 times daily.  Refills: 0     spironolactone 25 MG tablet  Commonly known as: ALDACTONE  Used for: S/P CABG x 3      Dose: 25 mg  Take 1 tablet (25 mg) by mouth daily.  Refills: 0     Tums 500 MG chewable tablet  Generic drug: calcium carbonate      Dose: 1,000 mg  Take 1,000 mg by mouth 3 times daily as needed for heartburn  Refills: 0     ursodiol 300 MG capsule  Commonly known as: ACTIGALL  Used for: Biliary stricture (H)      Dose: 300 mg  Take 1 capsule (300 mg) by mouth 2 times daily  Quantity: 60 capsule  Refills: 0            STOP taking      Dexcom G7  Leeann  Stopped by: Darling Valdivia        Dexcom G7 Sensor Misc  Stopped by: Darling Valdivia                 ASSESSMENT/PLAN  Encounter Diagnoses   Name Primary?     Physical deconditioning Yes     Pain management      Chronic systolic congestive heart failure (H)      NSTEMI (non-ST elevated myocardial infarction) (H)         Chest pressure - Sent to the ER for further evaluation     CABG x3 Follow up with cardiology, Pain management Life vest, ASA    Pain management as needed Tylenol, ,  spinal cord stimulator, Gabapentin     HDL on Atorvastatin    Physical deconditioning  PT/OT    Gout allopurinol    CHF- daily weights, Bumex Spironolactone     Diabetes type 2 continue glipizide XL 2.5 mg daily, glargine 10 units subcu twice daily,     Hypertension Metoprolol Succinate     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

## 2024-11-29 NOTE — ED NOTES
Writer completed full HTT Assessment. Pt is a Post-op Cabbage Pt. Come in today from his assisted living with a complaint of SOB, and chest pain. Patient describe a heaviness feeling on his chest. Incision is dry and intact, pt heart sounds are normal in S1, S2, and S3. Apical pulse strong and regular. Pt has strong pulses in all extremities. Lung sounds clear and equal bilaterally. Pt does have a Life pack on him that works as a defibrillator. Patient rates pain 2/10 on pain scale. Denies wanting anything for pain at this time. IV placed and labs sent.

## 2024-11-29 NOTE — DISCHARGE INSTRUCTIONS
Your EKG, blood work and CT scan today are reassuring.  There is small fluid collection within the chest which is common and almost expected after a heart bypass.  Blood work is reassuring and Dr. Evans was contacted about your ED course as well.  She too is comfortable with discharge.

## 2024-11-29 NOTE — ED TRIAGE NOTES
The pt arrives from an assisted living via EMS with c/o of chest pain. The pt had a CABG two weeks ago. He has had mild pain since but this morning at 6 am it got worse. He rated the pain as a 4/10, he received 324 mg of asa and 0.4 mg of SL nitroglycerin, pain is now a 2/10. Per EMS his oxygen saturations were 89% on RA.      Triage Assessment (Adult)       Row Name 11/29/24 0908          Triage Assessment    Airway WDL WDL        Cognitive/Neuro/Behavioral WDL    Cognitive/Neuro/Behavioral WDL WDL

## 2024-11-29 NOTE — ED NOTES
Bed: [facility]-19  Expected date:   Expected time:   Means of arrival:   Comments:  WBL-78 F, Abd pain, bloody stools

## 2024-12-01 ENCOUNTER — LAB REQUISITION (OUTPATIENT)
Dept: LAB | Facility: CLINIC | Age: 79
End: 2024-12-01
Payer: COMMERCIAL

## 2024-12-01 DIAGNOSIS — I48.91 UNSPECIFIED ATRIAL FIBRILLATION (H): ICD-10-CM

## 2024-12-01 DIAGNOSIS — D64.9 ANEMIA, UNSPECIFIED: ICD-10-CM

## 2024-12-01 LAB
ATRIAL RATE - MUSE: 92 BPM
DIASTOLIC BLOOD PRESSURE - MUSE: 88 MMHG
INTERPRETATION ECG - MUSE: NORMAL
P AXIS - MUSE: 87 DEGREES
PR INTERVAL - MUSE: 190 MS
QRS DURATION - MUSE: 112 MS
QT - MUSE: 370 MS
QTC - MUSE: 457 MS
R AXIS - MUSE: -32 DEGREES
SYSTOLIC BLOOD PRESSURE - MUSE: 150 MMHG
T AXIS - MUSE: 102 DEGREES
VENTRICULAR RATE- MUSE: 92 BPM

## 2024-12-02 ENCOUNTER — TRANSITIONAL CARE UNIT VISIT (OUTPATIENT)
Dept: GERIATRICS | Facility: CLINIC | Age: 79
End: 2024-12-02
Payer: COMMERCIAL

## 2024-12-02 ENCOUNTER — LAB REQUISITION (OUTPATIENT)
Dept: LAB | Facility: CLINIC | Age: 79
End: 2024-12-02
Payer: COMMERCIAL

## 2024-12-02 ENCOUNTER — TELEPHONE (OUTPATIENT)
Dept: CARDIOLOGY | Facility: CLINIC | Age: 79
End: 2024-12-02

## 2024-12-02 ENCOUNTER — DOCUMENTATION ONLY (OUTPATIENT)
Dept: GERIATRICS | Facility: CLINIC | Age: 79
End: 2024-12-02
Payer: COMMERCIAL

## 2024-12-02 VITALS
TEMPERATURE: 99.3 F | BODY MASS INDEX: 26.25 KG/M2 | OXYGEN SATURATION: 95 % | RESPIRATION RATE: 22 BRPM | SYSTOLIC BLOOD PRESSURE: 131 MMHG | HEART RATE: 102 BPM | DIASTOLIC BLOOD PRESSURE: 82 MMHG | HEIGHT: 72 IN | WEIGHT: 193.8 LBS

## 2024-12-02 VITALS
HEIGHT: 72 IN | DIASTOLIC BLOOD PRESSURE: 81 MMHG | SYSTOLIC BLOOD PRESSURE: 136 MMHG | TEMPERATURE: 98.8 F | WEIGHT: 188.2 LBS | BODY MASS INDEX: 25.49 KG/M2 | HEART RATE: 84 BPM | OXYGEN SATURATION: 93 % | RESPIRATION RATE: 18 BRPM

## 2024-12-02 DIAGNOSIS — Z51.81 ENCOUNTER FOR THERAPEUTIC DRUG LEVEL MONITORING: ICD-10-CM

## 2024-12-02 DIAGNOSIS — M10.9 GOUT, UNSPECIFIED: ICD-10-CM

## 2024-12-02 DIAGNOSIS — M10.9 ACUTE GOUT OF LEFT ELBOW, UNSPECIFIED CAUSE: ICD-10-CM

## 2024-12-02 DIAGNOSIS — R52 PAIN MANAGEMENT: ICD-10-CM

## 2024-12-02 DIAGNOSIS — R53.81 PHYSICAL DECONDITIONING: ICD-10-CM

## 2024-12-02 DIAGNOSIS — I21.4 NSTEMI (NON-ST ELEVATED MYOCARDIAL INFARCTION) (H): Primary | ICD-10-CM

## 2024-12-02 LAB
ANION GAP SERPL CALCULATED.3IONS-SCNC: 16 MMOL/L (ref 7–15)
BASOPHILS # BLD AUTO: 0.1 10E3/UL (ref 0–0.2)
BASOPHILS NFR BLD AUTO: 1 %
BUN SERPL-MCNC: 23.8 MG/DL (ref 8–23)
CALCIUM SERPL-MCNC: 8.7 MG/DL (ref 8.8–10.4)
CHLORIDE SERPL-SCNC: 103 MMOL/L (ref 98–107)
CREAT SERPL-MCNC: 1.39 MG/DL (ref 0.67–1.17)
EGFRCR SERPLBLD CKD-EPI 2021: 51 ML/MIN/1.73M2
EOSINOPHIL # BLD AUTO: 0.2 10E3/UL (ref 0–0.7)
EOSINOPHIL NFR BLD AUTO: 1 %
ERYTHROCYTE [DISTWIDTH] IN BLOOD BY AUTOMATED COUNT: 16 % (ref 10–15)
GLUCOSE SERPL-MCNC: 202 MG/DL (ref 70–99)
HCO3 SERPL-SCNC: 21 MMOL/L (ref 22–29)
HCT VFR BLD AUTO: 35.5 % (ref 40–53)
HGB BLD-MCNC: 10.7 G/DL (ref 13.3–17.7)
IMM GRANULOCYTES # BLD: 0.1 10E3/UL
IMM GRANULOCYTES NFR BLD: 1 %
LYMPHOCYTES # BLD AUTO: 1.3 10E3/UL (ref 0.8–5.3)
LYMPHOCYTES NFR BLD AUTO: 10 %
MCH RBC QN AUTO: 28.3 PG (ref 26.5–33)
MCHC RBC AUTO-ENTMCNC: 30.1 G/DL (ref 31.5–36.5)
MCV RBC AUTO: 94 FL (ref 78–100)
MONOCYTES # BLD AUTO: 0.9 10E3/UL (ref 0–1.3)
MONOCYTES NFR BLD AUTO: 7 %
NEUTROPHILS # BLD AUTO: 10.6 10E3/UL (ref 1.6–8.3)
NEUTROPHILS NFR BLD AUTO: 81 %
NRBC # BLD AUTO: 0 10E3/UL
NRBC BLD AUTO-RTO: 0 /100
PLATELET # BLD AUTO: 443 10E3/UL (ref 150–450)
POTASSIUM SERPL-SCNC: 4.2 MMOL/L (ref 3.4–5.3)
RBC # BLD AUTO: 3.78 10E6/UL (ref 4.4–5.9)
SODIUM SERPL-SCNC: 140 MMOL/L (ref 135–145)
WBC # BLD AUTO: 13.2 10E3/UL (ref 4–11)

## 2024-12-02 PROCEDURE — 99309 SBSQ NF CARE MODERATE MDM 30: CPT | Performed by: NURSE PRACTITIONER

## 2024-12-02 PROCEDURE — 36415 COLL VENOUS BLD VENIPUNCTURE: CPT | Mod: ORL | Performed by: FAMILY MEDICINE

## 2024-12-02 PROCEDURE — 85025 COMPLETE CBC W/AUTO DIFF WBC: CPT | Mod: ORL | Performed by: FAMILY MEDICINE

## 2024-12-02 PROCEDURE — P9604 ONE-WAY ALLOW PRORATED TRIP: HCPCS | Mod: ORL | Performed by: FAMILY MEDICINE

## 2024-12-02 PROCEDURE — 80048 BASIC METABOLIC PNL TOTAL CA: CPT | Mod: ORL | Performed by: FAMILY MEDICINE

## 2024-12-02 RX ORDER — FERROUS GLUCONATE 324(37.5)
1 TABLET ORAL DAILY
COMMUNITY

## 2024-12-02 RX ORDER — PREDNISONE 20 MG/1
40 TABLET ORAL DAILY
COMMUNITY
Start: 2024-12-03 | End: 2024-12-02

## 2024-12-02 RX ORDER — PREDNISONE 20 MG/1
40 TABLET ORAL DAILY
COMMUNITY
Start: 2024-12-03 | End: 2024-12-08

## 2024-12-02 RX ORDER — SENNOSIDES 8.6 MG
2 TABLET ORAL AT BEDTIME
COMMUNITY
Start: 2024-12-02 | End: 2024-12-02

## 2024-12-02 NOTE — LETTER
12/2/2024      Lance Ibrahim  [address]  [facility] [address]        [facility] GERIATRIC SERVICES  Chief Complaint   Patient presents with     CHARLEE     By[facility] Medical Record Number:  [id number]  Place of Service where encounter took place:  [facility] ([facility]) [[id number]]  Code Status:  Full Code     HISTORY:      HPI:  Lance Ibrahim  is 79 year old (1945) undergoing physical and occupational therapy.    He is with past medical history significant for HFpEF, diabetes, gout,  Excerpted from records  who presented with a STEMI on 11/14/24.  Subsequent coronary angiography revealed very tight left main disease and a totally occluded RCA. Over the weekend he had chest pain at rest and an intra-aortic balloon pump was placed. He was referred to CV surgery for evaluation for possible coronary artery revascularization.      Patient was deemed a candidate for coronary artery bypass surgery, and was taken to the operating room on 11/18/24 where patient underwent 3 vessel coronary artery bypass and endoscopic vein harvest from the left leg. Surgery was uneventful and patient was brought to the ICU post-operatively.IABP was removed POD#1. He was extubated on POD#2 and weaned from pressors. Patient was awake and alert, afebrile, and with stable vitals. Insulin drip was discontinued and he was transitioned to a sliding scale. He was transferred to general telemetry status on POD#4 where patient has had return of bowel function, is maintaining oxygen saturations on room air, had his chest tubes removed, and has no complaints of chest pain or shortness of breath. On 11/26/24, patient was stable enough to be discharged to TCU.     Of note, postop echo showed EF 25%, therefore a Lifevest was ordered and place on pt.before discharge to TCU. Lifevest ordered for 3 mos or until heart failure discontinues it. Heart failure was consulted and appropriate medications were prescribed with follow up appointments.      Today he is seen to review VS, routine visit and to follow up recent ER. Pt sent to the ER on 11/29 after reports of feeling like a small elephant on his chest.  Full cardiac workup with twelve-lead EKG showing no acute ischemic changes, he received relief with nitro x 1.  He then returned to the TCU.  Today he denied chest pain shortness of breath cough or congestion.  He is status post CABG x 3.   Currently wearing a LifeVest and writer assisted with changing the battery.  Per his report he has not walked in months due to bone-on-bone left knee.  Pain is controlled with current medications.  Today his main concern was a possible gout flareup left elbow he was given colchicine x 2 doses and started on prednisone 40 mg daily x 5 days.  Labs pending for a.m.  Weights reviewed he is down 9.4 pounds over the last 4 days.  Midline surgical incision clean dry and intact open to air, left medial left leg incisions clean dry intact open to air.    ALLERGIES:Ancef [cefazolin], Cephalexin, Penicillins, and Sulfa antibiotics    PAST MEDICAL HISTORY:   Past Medical History:   Diagnosis Date     Anemia      Arthritis     osteoarthritis knees     BPH      CAD (coronary artery disease)     subtotal occlusion in the small distal LAD      Cardiomyopathy      Cerebral artery occlusion with cerebral infarction     TIA 1993, no residual     Cervicalgia      CHF (congestive heart failure) (H)      Chronic kidney disease      Chronic low back pain      Chronic rhinitis      Gouty arthropathy     hands     Hyperlipidemia      Hypertension      Kidney stone      Nocturia      Obesity      Osteomyelitis (H) 08/2023    Foot     PVD (peripheral vascular disease)      Sleep apnea     doesn't use cpap     Spinal cord stimulator status 04/2024    Spinal cord stimulator in place     TIA (transient ischaemic attack) 1993     Type 2 diabetes mellitus - 11/08/2018     Ureteral stone        PAST SURGICAL HISTORY:   has a past surgical history that  includes Diastasis recti repair (1985); Ventral hernia repair NOS (1987); ORIF Shoulder (Left); Colonoscopy (03/09/2013); hernia repair (07/13/2004); Foot surgery (04/2013); Amputate toe(s) (Left, 10/15/2018); Arthroplasty knee (Right, 12/07/2018); Heart Catheterization with Possible Intervention (Left, 03/05/2019); Extracapsular cataract extration with intraocular lens implant (Left, 03/13/2017); Extracapsular cataract extration with intraocular lens implant (Right); Amputate foot (Right, 08/07/2023); Esophagoscopy, gastroscopy, duodenoscopy (EGD), combined (N/A, 04/30/2024); Endoscopic retrograde cholangiopancreatogram (N/A, 04/30/2024); Spinal Cord Stimulator Implant (04/03/2024); Prostate surgery (02/2024); IR Disc Aspriation Injection (6/19/2024); Irrigation and debridement shoulder, combined (Right, 8/7/2024); Resect clavicle distal (Right, 8/7/2024); Irrigation and debridement upper extremity, combined (Right, 8/7/2024); Coronary Angiogram (N/A, 11/14/2024); Intravascular Ultrasound (N/A, 11/14/2024); Left Heart Catheterization (N/A, 11/14/2024); Intra aortic Balloon Pump Insertion (N/A, 11/16/2024); Coronary Artery Bypass Graft, With Endoscopic Vessel Procurement (N/A, 11/18/2024); and Transesophageal echocardiogram intraoperative (N/A, 11/18/2024).    FAMILY HISTORY: family history includes Alcohol/Drug in his paternal grandfather; Cancer in his father; Diabetes in his maternal grandfather; Family History Negative in his mother.    SOCIAL HISTORY:  reports that he quit smoking about 31 years ago. His smoking use included cigarettes. He has never been exposed to tobacco smoke. He has never used smokeless tobacco. He reports that he does not currently use alcohol. He reports that he does not use drugs.    ROS:  Constitutional: Negative for activity change, appetite change, fatigue and fever. Hasn't ambulated in months due to bone on bone left knee   HENT: Negative for congestion.    Respiratory: Negative for  cough, shortness of breath and wheezing.    Cardiovascular: Negative for chest pain and leg swelling. Chest pressure   Gastrointestinal: Negative for abdominal distention, abdominal pain, constipation, diarrhea and nausea.   Genitourinary: Negative for dysuria.   Musculoskeletal:   Skin: Negative for color change and wound.  Midline surgical incision, surgical wounds left medial leg   Neurological: Negative for dizziness.   Psychiatric/Behavioral: Negative for agitation, behavioral problems and confusion.     Physical Exam:  Constitutional:       Appearance: Patient is well-developed.   HENT:      Head: Normocephalic.   Eyes:      Conjunctiva/sclera: Conjunctivae normal.   Neck:      Musculoskeletal: Normal range of motion.   Cardiovascular:      Rate and Rhythm: Normal rate and regular rhythm.      Heart sounds: Normal heart sounds. No murmur. Wearing a life vest   Pulmonary:      Effort: No respiratory distress.      Breath sounds: Normal breath sounds.   no wheezing or rales.   Abdominal:      General: Bowel sounds are normal. There is no distension.      Palpations: Abdomen is soft.      Tenderness: There is no abdominal tenderness.   Musculoskeletal:       Normal range of motion.  not ambulating per his report   Skin:General:        Skin is warm. Midline chest incision C/D/I TANVI, medial left leg incision   Neurological:         Mental Status: Patient is alert and oriented to person, place, and time.   Psychiatric:         Behavior: Behavior normal.     Vitals:/81   Pulse 84   Temp 98.8  F (37.1  C)   Resp 18   Ht 1.829 m (6')   Wt 85.4 kg (188 lb 3.2 oz)   SpO2 93%   BMI 25.52 kg/m   and Body mass index is 25.52 kg/m .    Lab/Diagnostic data:   Recent Results (from the past 240 hours)   Ionized Calcium    Collection Time: 11/23/24  5:39 AM   Result Value Ref Range    Calcium Ionized Whole Blood 4.4 4.4 - 5.2 mg/dL   Phosphorus    Collection Time: 11/23/24  5:39 AM   Result Value Ref Range     Phosphorus 2.3 (L) 2.5 - 4.5 mg/dL   Magnesium    Collection Time: 11/23/24  5:39 AM   Result Value Ref Range    Magnesium 2.1 1.7 - 2.3 mg/dL   Potassium    Collection Time: 11/23/24  5:39 AM   Result Value Ref Range    Potassium 3.7 3.4 - 5.3 mmol/L   Extra Purple Top EDTA (LAB USE ONLY)    Collection Time: 11/23/24  5:39 AM   Result Value Ref Range    Hold Specimen JI    CBC with platelets    Collection Time: 11/23/24  5:39 AM   Result Value Ref Range    WBC Count 10.7 4.0 - 11.0 10e3/uL    RBC Count 3.83 (L) 4.40 - 5.90 10e6/uL    Hemoglobin 10.9 (L) 13.3 - 17.7 g/dL    Hematocrit 35.4 (L) 40.0 - 53.0 %    MCV 92 78 - 100 fL    MCH 28.5 26.5 - 33.0 pg    MCHC 30.8 (L) 31.5 - 36.5 g/dL    RDW 16.2 (H) 10.0 - 15.0 %    Platelet Count 306 150 - 450 10e3/uL   Basic metabolic panel    Collection Time: 11/23/24  5:39 AM   Result Value Ref Range    Sodium 145 135 - 145 mmol/L    Potassium 3.7 3.4 - 5.3 mmol/L    Chloride 108 (H) 98 - 107 mmol/L    Carbon Dioxide (CO2) 25 22 - 29 mmol/L    Anion Gap 12 7 - 15 mmol/L    Urea Nitrogen 35.9 (H) 8.0 - 23.0 mg/dL    Creatinine 1.55 (H) 0.67 - 1.17 mg/dL    GFR Estimate 45 (L) >60 mL/min/1.73m2    Calcium 8.2 (L) 8.8 - 10.4 mg/dL    Glucose 103 (H) 70 - 99 mg/dL   Glucose by meter    Collection Time: 11/23/24  7:39 AM   Result Value Ref Range    GLUCOSE BY METER POCT 113 (H) 70 - 99 mg/dL   Glucose by meter    Collection Time: 11/23/24 12:04 PM   Result Value Ref Range    GLUCOSE BY METER POCT 175 (H) 70 - 99 mg/dL   Ionized Calcium    Collection Time: 11/23/24  4:04 PM   Result Value Ref Range    Calcium Ionized Whole Blood 4.4 4.4 - 5.2 mg/dL   Glucose by meter    Collection Time: 11/23/24  5:15 PM   Result Value Ref Range    GLUCOSE BY METER POCT 135 (H) 70 - 99 mg/dL   Glucose by meter    Collection Time: 11/23/24  8:52 PM   Result Value Ref Range    GLUCOSE BY METER POCT 186 (H) 70 - 99 mg/dL   Ionized Calcium    Collection Time: 11/23/24 10:37 PM   Result Value Ref  Range    Calcium Ionized Whole Blood 4.5 4.4 - 5.2 mg/dL   Magnesium    Collection Time: 11/24/24  5:51 AM   Result Value Ref Range    Magnesium 2.1 1.7 - 2.3 mg/dL   Potassium    Collection Time: 11/24/24  5:51 AM   Result Value Ref Range    Potassium 3.8 3.4 - 5.3 mmol/L   Extra Purple Top EDTA (LAB USE ONLY)    Collection Time: 11/24/24  5:51 AM   Result Value Ref Range    Hold Specimen JIC    Glucose by meter    Collection Time: 11/24/24  7:26 AM   Result Value Ref Range    GLUCOSE BY METER POCT 153 (H) 70 - 99 mg/dL   Echocardiogram Limited    Collection Time: 11/24/24 11:45 AM   Result Value Ref Range    LVEF  20-25% (severely reduced)    Glucose by meter    Collection Time: 11/24/24 12:26 PM   Result Value Ref Range    GLUCOSE BY METER POCT 144 (H) 70 - 99 mg/dL   Glucose by meter    Collection Time: 11/24/24  4:37 PM   Result Value Ref Range    GLUCOSE BY METER POCT 194 (H) 70 - 99 mg/dL   Glucose by meter    Collection Time: 11/24/24  9:04 PM   Result Value Ref Range    GLUCOSE BY METER POCT 165 (H) 70 - 99 mg/dL   Magnesium    Collection Time: 11/25/24  4:33 AM   Result Value Ref Range    Magnesium 2.3 1.7 - 2.3 mg/dL   Potassium    Collection Time: 11/25/24  4:33 AM   Result Value Ref Range    Potassium 3.9 3.4 - 5.3 mmol/L   Extra Purple Top EDTA (LAB USE ONLY)    Collection Time: 11/25/24  4:33 AM   Result Value Ref Range    Hold Specimen JIC    Glucose by meter    Collection Time: 11/25/24  7:57 AM   Result Value Ref Range    GLUCOSE BY METER POCT 163 (H) 70 - 99 mg/dL   Glucose by meter    Collection Time: 11/25/24 11:51 AM   Result Value Ref Range    GLUCOSE BY METER POCT 208 (H) 70 - 99 mg/dL   Glucose by meter    Collection Time: 11/25/24  5:14 PM   Result Value Ref Range    GLUCOSE BY METER POCT 140 (H) 70 - 99 mg/dL   Glucose by meter    Collection Time: 11/25/24  9:07 PM   Result Value Ref Range    GLUCOSE BY METER POCT 167 (H) 70 - 99 mg/dL   Glucose by meter    Collection Time: 11/26/24  2:21  AM   Result Value Ref Range    GLUCOSE BY METER POCT 136 (H) 70 - 99 mg/dL   Potassium    Collection Time: 11/26/24  4:22 AM   Result Value Ref Range    Potassium 3.6 3.4 - 5.3 mmol/L   Magnesium    Collection Time: 11/26/24  4:22 AM   Result Value Ref Range    Magnesium 2.2 1.7 - 2.3 mg/dL   Basic metabolic panel    Collection Time: 11/26/24  4:22 AM   Result Value Ref Range    Sodium 141 135 - 145 mmol/L    Potassium 3.6 3.4 - 5.3 mmol/L    Chloride 105 98 - 107 mmol/L    Carbon Dioxide (CO2) 26 22 - 29 mmol/L    Anion Gap 10 7 - 15 mmol/L    Urea Nitrogen 24.3 (H) 8.0 - 23.0 mg/dL    Creatinine 1.35 (H) 0.67 - 1.17 mg/dL    GFR Estimate 53 (L) >60 mL/min/1.73m2    Calcium 8.3 (L) 8.8 - 10.4 mg/dL    Glucose 128 (H) 70 - 99 mg/dL   Glucose by meter    Collection Time: 11/26/24  7:37 AM   Result Value Ref Range    GLUCOSE BY METER POCT 160 (H) 70 - 99 mg/dL   Glucose by meter    Collection Time: 11/26/24 11:15 AM   Result Value Ref Range    GLUCOSE BY METER POCT 169 (H) 70 - 99 mg/dL   Glucose by meter    Collection Time: 11/26/24  4:06 PM   Result Value Ref Range    GLUCOSE BY METER POCT 82 70 - 99 mg/dL   ECG 12-LEAD WITH MUSE (LHE)    Collection Time: 11/29/24  9:21 AM   Result Value Ref Range    Systolic Blood Pressure 150 mmHg    Diastolic Blood Pressure 88 mmHg    Ventricular Rate 92 BPM    Atrial Rate 92 BPM    AL Interval 190 ms    QRS Duration 112 ms     ms    QTc 457 ms    P Axis 87 degrees    R AXIS -32 degrees    T Axis 102 degrees    Interpretation ECG       Sinus rhythm with Premature supraventricular complexes  Left axis deviation  Pulmonary disease pattern  T wave abnormality, consider lateral ischemia  Abnormal ECG  When compared with ECG of 29-Nov-2024 09:12,  Premature supraventricular complexes are now Present  Confirmed by SEE ED PROVIDER NOTE FOR, ECG INTERPRETATION ([id number]),  BESSIE CONDON ([id number]) on 12/1/2024 12:02:36 AM     Basic metabolic panel    Collection Time: 11/29/24   9:30 AM   Result Value Ref Range    Sodium 142 135 - 145 mmol/L    Potassium 4.3 3.4 - 5.3 mmol/L    Chloride 106 98 - 107 mmol/L    Carbon Dioxide (CO2) 24 22 - 29 mmol/L    Anion Gap 12 7 - 15 mmol/L    Urea Nitrogen 18.4 8.0 - 23.0 mg/dL    Creatinine 1.53 (H) 0.67 - 1.17 mg/dL    GFR Estimate 46 (L) >60 mL/min/1.73m2    Calcium 8.4 (L) 8.8 - 10.4 mg/dL    Glucose 135 (H) 70 - 99 mg/dL   Troponin T, High Sensitivity    Collection Time: 11/29/24  9:30 AM   Result Value Ref Range    Troponin T, High Sensitivity 91 (H) <=22 ng/L   INR    Collection Time: 11/29/24  9:30 AM   Result Value Ref Range    INR 1.23 (H) 0.85 - 1.15   Partial thromboplastin time    Collection Time: 11/29/24  9:30 AM   Result Value Ref Range    aPTT 34 22 - 38 Seconds   CBC with platelets and differential    Collection Time: 11/29/24  9:30 AM   Result Value Ref Range    WBC Count 16.8 (H) 4.0 - 11.0 10e3/uL    RBC Count 3.86 (L) 4.40 - 5.90 10e6/uL    Hemoglobin 10.9 (L) 13.3 - 17.7 g/dL    Hematocrit 35.4 (L) 40.0 - 53.0 %    MCV 92 78 - 100 fL    MCH 28.2 26.5 - 33.0 pg    MCHC 30.8 (L) 31.5 - 36.5 g/dL    RDW 16.2 (H) 10.0 - 15.0 %    Platelet Count 395 150 - 450 10e3/uL    % Neutrophils 85 %    % Lymphocytes 7 %    % Monocytes 7 %    % Eosinophils 0 %    % Basophils 1 %    % Immature Granulocytes 1 %    NRBCs per 100 WBC 0 <1 /100    Absolute Neutrophils 14.2 (H) 1.6 - 8.3 10e3/uL    Absolute Lymphocytes 1.2 0.8 - 5.3 10e3/uL    Absolute Monocytes 1.2 0.0 - 1.3 10e3/uL    Absolute Eosinophils 0.1 0.0 - 0.7 10e3/uL    Absolute Basophils 0.1 0.0 - 0.2 10e3/uL    Absolute Immature Granulocytes 0.1 <=0.4 10e3/uL    Absolute NRBCs 0.0 10e3/uL   Troponin T, High Sensitivity    Collection Time: 11/29/24 12:08 PM   Result Value Ref Range    Troponin T, High Sensitivity 86 (H) <=22 ng/L   Glucose (OUTREACH)    Collection Time: 12/02/24  9:44 AM   Result Value Ref Range    Glucose 202 (H) 70 - 99 mg/dL   Basic Metabolic Panel No Glucose (OUTREACH)     Collection Time: 12/02/24  9:44 AM   Result Value Ref Range    Sodium 140 135 - 145 mmol/L    Potassium 4.2 3.4 - 5.3 mmol/L    Chloride 103 98 - 107 mmol/L    Carbon Dioxide (CO2) 21 (L) 22 - 29 mmol/L    Anion Gap 16 (H) 7 - 15 mmol/L    Urea Nitrogen 23.8 (H) 8.0 - 23.0 mg/dL    Creatinine 1.39 (H) 0.67 - 1.17 mg/dL    GFR Estimate 51 (L) >60 mL/min/1.73m2    Calcium 8.7 (L) 8.8 - 10.4 mg/dL   CBC with platelets and differential    Collection Time: 12/02/24  9:44 AM   Result Value Ref Range    WBC Count 13.2 (H) 4.0 - 11.0 10e3/uL    RBC Count 3.78 (L) 4.40 - 5.90 10e6/uL    Hemoglobin 10.7 (L) 13.3 - 17.7 g/dL    Hematocrit 35.5 (L) 40.0 - 53.0 %    MCV 94 78 - 100 fL    MCH 28.3 26.5 - 33.0 pg    MCHC 30.1 (L) 31.5 - 36.5 g/dL    RDW 16.0 (H) 10.0 - 15.0 %    Platelet Count 443 150 - 450 10e3/uL    % Neutrophils 81 %    % Lymphocytes 10 %    % Monocytes 7 %    % Eosinophils 1 %    % Basophils 1 %    % Immature Granulocytes 1 %    NRBCs per 100 WBC 0 <1 /100    Absolute Neutrophils 10.6 (H) 1.6 - 8.3 10e3/uL    Absolute Lymphocytes 1.3 0.8 - 5.3 10e3/uL    Absolute Monocytes 0.9 0.0 - 1.3 10e3/uL    Absolute Eosinophils 0.2 0.0 - 0.7 10e3/uL    Absolute Basophils 0.1 0.0 - 0.2 10e3/uL    Absolute Immature Granulocytes 0.1 <=0.4 10e3/uL    Absolute NRBCs 0.0 10e3/uL        MEDICATIONS:  MED REC REQUIRED  Post Medication Reconciliation Status: discharge medications reconciled, continue medications without change          Review of your medicines            Accurate as of December 2, 2024 10:06 PM. If you have any questions, ask your nurse or doctor.                CONTINUE these medicines which have NOT CHANGED        Dose / Directions   acetaminophen 325 MG tablet  Commonly known as: TYLENOL  Used for: S/P CABG x 3      Dose: 650 mg  Take 2 tablets (650 mg) by mouth every 4 hours as needed for other (For optimal non-opioid multimodal pain management to improve pain control.).  Refills: 0     allopurinol 100 MG  tablet  Commonly known as: ZYLOPRIM      Dose: 100 mg  Take 100 mg by mouth daily.  Refills: 0     aspirin 81 MG chewable tablet  Commonly known as: ASA  Used for: S/P CABG x 3      Dose: 162 mg  Take 2 tablets (162 mg) by mouth daily.  Refills: 0     atorvastatin 40 MG tablet  Commonly known as: LIPITOR  Used for: S/P CABG x 3      Dose: 40 mg  Take 1 tablet (40 mg) by mouth every evening.  Refills: 0     bumetanide 1 MG tablet  Commonly known as: BUMEX  Used for: S/P CABG x 3      Dose: 1 mg  Take 1 tablet (1 mg) by mouth daily.  Refills: 0     Ferrous Gluconate 324 (37.5 Fe) MG Tabs      Dose: 1 tablet  Take 1 tablet by mouth daily.  Refills: 0     finasteride 5 MG tablet  Commonly known as: PROSCAR      Dose: 5 mg  Take 5 mg by mouth daily  Refills: 0     gabapentin 300 MG capsule  Commonly known as: NEURONTIN  Used for: S/P CABG x 3      Dose: 300 mg  Take 1 capsule (300 mg) by mouth 3 times daily as needed.  Refills: 0     glipiZIDE 2.5 MG 24 hr tablet  Commonly known as: GLUCOTROL XL  Used for: S/P CABG x 3      Dose: 2.5 mg  Take 1 tablet (2.5 mg) by mouth daily.  Refills: 0     insulin glargine 100 UNIT/ML pen  Commonly known as: LANTUS PEN  Used for: S/P CABG x 3      Dose: 10 Units  Inject 10 Units subcutaneously 2 times daily.  Refills: 0     LubriFresh P.M. Oint  Used for: S/P CABG x 3      Dose: 1 Application  Apply 1 g to eye 2 times daily as needed for dry eyes.  Refills: 0     melatonin 5 MG tablet  Used for: S/P CABG x 3      Dose: 5 mg  Take 1 tablet (5 mg) by mouth nightly as needed for sleep.  Refills: 0     metoprolol succinate  MG 24 hr tablet  Commonly known as: TOPROL XL  Used for: S/P CABG x 3      Dose: 100 mg  Take 1 tablet (100 mg) by mouth daily.  Refills: 0     pantoprazole 40 MG EC tablet  Commonly known as: PROTONIX  Used for: S/P CABG x 3      Dose: 40 mg  Take 1 tablet (40 mg) by mouth every morning (before breakfast).  Refills: 0     predniSONE 20 MG tablet  Commonly known  as: DELTASONE      Dose: 40 mg  Start taking on: December 3, 2024  Take 40 mg by mouth daily.  Refills: 0     sodium bicarbonate 650 MG tablet  Used for: S/P CABG x 3      Dose: 1,950 mg  Take 3 tablets (1,950 mg) by mouth 3 times daily.  Refills: 0     spironolactone 25 MG tablet  Commonly known as: ALDACTONE  Used for: S/P CABG x 3      Dose: 25 mg  Take 1 tablet (25 mg) by mouth daily.  Refills: 0     Tums 500 MG chewable tablet  Generic drug: calcium carbonate      Dose: 1,000 mg  Take 1,000 mg by mouth 3 times daily as needed for heartburn  Refills: 0     ursodiol 300 MG capsule  Commonly known as: ACTIGALL  Used for: Biliary stricture (H)      Dose: 300 mg  Take 1 capsule (300 mg) by mouth 2 times daily  Quantity: 60 capsule  Refills: 0              ASSESSMENT/PLAN  Encounter Diagnoses   Name Primary?     NSTEMI (non-ST elevated myocardial infarction) (H) Yes     Physical deconditioning      Pain management      Acute gout of left elbow, unspecified cause      Possible gout left elbow colchicine x 2 doses, prednisone 40 mg x 5 days check labs in the a.m.    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

## 2024-12-03 ENCOUNTER — TELEPHONE (OUTPATIENT)
Dept: GERIATRICS | Facility: CLINIC | Age: 79
End: 2024-12-03

## 2024-12-03 ENCOUNTER — TRANSITIONAL CARE UNIT VISIT (OUTPATIENT)
Dept: GERIATRICS | Facility: CLINIC | Age: 79
End: 2024-12-03
Payer: COMMERCIAL

## 2024-12-03 VITALS
DIASTOLIC BLOOD PRESSURE: 81 MMHG | WEIGHT: 187 LBS | TEMPERATURE: 98.8 F | BODY MASS INDEX: 25.33 KG/M2 | HEIGHT: 72 IN | OXYGEN SATURATION: 93 % | RESPIRATION RATE: 18 BRPM | HEART RATE: 84 BPM | SYSTOLIC BLOOD PRESSURE: 136 MMHG

## 2024-12-03 DIAGNOSIS — R52 PAIN MANAGEMENT: ICD-10-CM

## 2024-12-03 DIAGNOSIS — G47.00 INSOMNIA, UNSPECIFIED TYPE: Primary | ICD-10-CM

## 2024-12-03 LAB
ANION GAP SERPL CALCULATED.3IONS-SCNC: 14 MMOL/L (ref 7–15)
BUN SERPL-MCNC: 23.5 MG/DL (ref 8–23)
CALCIUM SERPL-MCNC: 9.1 MG/DL (ref 8.8–10.4)
CHLORIDE SERPL-SCNC: 103 MMOL/L (ref 98–107)
CREAT SERPL-MCNC: 1.35 MG/DL (ref 0.67–1.17)
EGFRCR SERPLBLD CKD-EPI 2021: 53 ML/MIN/1.73M2
ERYTHROCYTE [DISTWIDTH] IN BLOOD BY AUTOMATED COUNT: 15.8 % (ref 10–15)
GLUCOSE SERPL-MCNC: 181 MG/DL (ref 70–99)
HCO3 SERPL-SCNC: 22 MMOL/L (ref 22–29)
HCT VFR BLD AUTO: 38.2 % (ref 40–53)
HGB BLD-MCNC: 11.1 G/DL (ref 13.3–17.7)
MAGNESIUM SERPL-MCNC: 2.3 MG/DL (ref 1.7–2.3)
MCH RBC QN AUTO: 27.6 PG (ref 26.5–33)
MCHC RBC AUTO-ENTMCNC: 29.1 G/DL (ref 31.5–36.5)
MCV RBC AUTO: 95 FL (ref 78–100)
PLATELET # BLD AUTO: 487 10E3/UL (ref 150–450)
POTASSIUM SERPL-SCNC: 4.5 MMOL/L (ref 3.4–5.3)
RBC # BLD AUTO: 4.02 10E6/UL (ref 4.4–5.9)
SODIUM SERPL-SCNC: 139 MMOL/L (ref 135–145)
URATE SERPL-MCNC: 6.9 MG/DL (ref 3.4–7)
WBC # BLD AUTO: 11.6 10E3/UL (ref 4–11)

## 2024-12-03 PROCEDURE — 99309 SBSQ NF CARE MODERATE MDM 30: CPT | Performed by: NURSE PRACTITIONER

## 2024-12-03 RX ORDER — ACETAMINOPHEN 500 MG
1000 TABLET ORAL 3 TIMES DAILY
COMMUNITY

## 2024-12-03 RX ORDER — TRAZODONE HYDROCHLORIDE 50 MG/1
25 TABLET, FILM COATED ORAL AT BEDTIME
COMMUNITY

## 2024-12-03 NOTE — LETTER
12/3/2024      Lance Ibrahim  [address]  [facility] [id number]        [facility]ERIATRIC [facility]  Chief Complaint   Patient presents with     CHARLEE     [facility] Medical Record Number:  [id number]  Place of Service where encounter took place:  [facility] (formerly [facility] ([facility]) [[id number]]  Code Status:  Full Code     HISTORY:      HPI:  Lance Ibrahim  is 79 year old (1945) undergoing physical and occupational therapy.    He is with past medical history significant for HFpEF, diabetes, gout,  Excerpted from records  who presented with a STEMI on 11/14/24.  Subsequent coronary angiography revealed very tight left main disease and a totally occluded RCA. Over the weekend he had chest pain at rest and an intra-aortic balloon pump was placed. He was referred to CV surgery for evaluation for possible coronary artery revascularization.      Patient was deemed a candidate for coronary artery bypass surgery, and was taken to the operating room on 11/18/24 where patient underwent 3 vessel coronary artery bypass and endoscopic vein harvest from the left leg. Surgery was uneventful and patient was brought to the ICU post-operatively.IABP was removed POD#1. He was extubated on POD#2 and weaned from pressors. Patient was awake and alert, afebrile, and with stable vitals. Insulin drip was discontinued and he was transitioned to a sliding scale. He was transferred to general telemetry status on POD#4 where patient has had return of bowel function, is maintaining oxygen saturations on room air, had his chest tubes removed, and has no complaints of chest pain or shortness of breath. On 11/26/24, patient was stable enough to be discharged to TCU.     Of note, postop echo showed EF 25%, therefore a Lifevest was ordered and place on pt.before discharge to TCU. Lifevest ordered for 3 mos or until heart failure discontinues it. Heart failure was consulted and appropriate medications were prescribed with follow up appointments.    "  Today he is seen to review VS, routine visit and medication encounter.  Patient rate reports not sleeping well at night however he did not sleep \"good\" last night.  He was on as needed melatonin in which the nurses have been giving him.  He does not feel it works well it was discontinued and he was started on 25 mg of trazodone at bedtime.  He also requested to have his regular strength Tylenol changed to extra strength and this was completed for him.  He was recently sent to the ER on 11/29 after reports of feeling like a small elephant on his chest.  Full cardiac workup with twelve-lead EKG showing no acute ischemic changes, he received relief with nitro x 1.  He then returned to the TCU.  Today he denied chest pain shortness of breath cough or congestion.  He is status post CABG x 3.   Currently wearing a LifeVest and writer assisted with changing the battery.  Per his report he has not walked in months due to bone-on-bone left knee.  Pain is controlled with current medications.  He continues to have a possible gout flareup left elbow he was given colchicine x 2 doses and started on prednisone 40 mg daily x 5 days.  However he will receive his first dose of prednisone today.  Labs pending  for today. Weights reviewed he is down 9.4 pounds over the last 4 days.  Midline surgical incision clean dry and intact open to air, left medial left leg incisions clean dry intact open to air.  They writer assisted him with charging his stimulator on his right buttock.  It appears his batteries have not been working well for his LifeVest and writer spoke with nursing who will follow-up this morning with the company.    ALLERGIES:Ancef [cefazolin], Cephalexin, Penicillins, and Sulfa antibiotics    PAST MEDICAL HISTORY:   Past Medical History:   Diagnosis Date     Anemia      Arthritis     osteoarthritis knees     BPH      CAD (coronary artery disease)     subtotal occlusion in the small distal LAD      Cardiomyopathy      " Cerebral artery occlusion with cerebral infarction     TIA 1993, no residual     Cervicalgia      CHF (congestive heart failure) (H)      Chronic kidney disease      Chronic low back pain      Chronic rhinitis      Gouty arthropathy     hands     Hyperlipidemia      Hypertension      Kidney stone      Nocturia      Obesity      Osteomyelitis (H) 08/2023    Foot     PVD (peripheral vascular disease)      Sleep apnea     doesn't use cpap     Spinal cord stimulator status 04/2024    Spinal cord stimulator in place     TIA (transient ischaemic attack) 1993     Type 2 diabetes mellitus - 11/08/2018     Ureteral stone        PAST SURGICAL HISTORY:   has a past surgical history that includes Diastasis recti repair (1985); Ventral hernia repair NOS (1987); ORIF Shoulder (Left); Colonoscopy (03/09/2013); hernia repair (07/13/2004); Foot surgery (04/2013); Amputate toe(s) (Left, 10/15/2018); Arthroplasty knee (Right, 12/07/2018); Heart Catheterization with Possible Intervention (Left, 03/05/2019); Extracapsular cataract extration with intraocular lens implant (Left, 03/13/2017); Extracapsular cataract extration with intraocular lens implant (Right); Amputate foot (Right, 08/07/2023); Esophagoscopy, gastroscopy, duodenoscopy (EGD), combined (N/A, 04/30/2024); Endoscopic retrograde cholangiopancreatogram (N/A, 04/30/2024); Spinal Cord Stimulator Implant (04/03/2024); Prostate surgery (02/2024); IR Disc Aspriation Injection (6/19/2024); Irrigation and debridement shoulder, combined (Right, 8/7/2024); Resect clavicle distal (Right, 8/7/2024); Irrigation and debridement upper extremity, combined (Right, 8/7/2024); Coronary Angiogram (N/A, 11/14/2024); Intravascular Ultrasound (N/A, 11/14/2024); Left Heart Catheterization (N/A, 11/14/2024); Intra aortic Balloon Pump Insertion (N/A, 11/16/2024); Coronary Artery Bypass Graft, With Endoscopic Vessel Procurement (N/A, 11/18/2024); and Transesophageal echocardiogram intraoperative (N/A,  11/18/2024).    FAMILY HISTORY: family history includes Alcohol/Drug in his paternal grandfather; Cancer in his father; Diabetes in his maternal grandfather; Family History Negative in his mother.    SOCIAL HISTORY:  reports that he quit smoking about 31 years ago. His smoking use included cigarettes. He has never been exposed to tobacco smoke. He has never used smokeless tobacco. He reports that he does not currently use alcohol. He reports that he does not use drugs.    ROS:  Constitutional: Negative for activity change, appetite change, fatigue and fever. Hasn't ambulated in months due to bone on bone left knee   HENT: Negative for congestion.    Respiratory: Negative for cough, shortness of breath and wheezing.    Cardiovascular: Negative for chest pain and leg swelling. Chest pressure   Gastrointestinal: Negative for abdominal distention, abdominal pain, constipation, diarrhea and nausea.   Genitourinary: Negative for dysuria.   Musculoskeletal:   Skin: Negative for color change and wound.  Midline surgical incision, surgical wounds left medial leg   Neurological: Negative for dizziness.   Psychiatric/Behavioral: Negative for agitation, behavioral problems and confusion.     Physical Exam:  Constitutional:       Appearance: Patient is well-developed.   HENT:      Head: Normocephalic.   Eyes:      Conjunctiva/sclera: Conjunctivae normal.   Neck:      Musculoskeletal: Normal range of motion.   Cardiovascular:      Rate and Rhythm: Normal rate and regular rhythm.      Heart sounds: Normal heart sounds. No murmur. Wearing a life vest   Pulmonary:      Effort: No respiratory distress.      Breath sounds: Normal breath sounds.   no wheezing or rales.   Abdominal:      General: Bowel sounds are normal. There is no distension.      Palpations: Abdomen is soft.      Tenderness: There is no abdominal tenderness.   Musculoskeletal:       Normal range of motion.  not ambulating per his report   Skin:General:        Skin is  warm. Midline chest incision C/D/I TANVI, medial left leg incision   Neurological:         Mental Status: Patient is alert and oriented to person, place, and time.   Psychiatric:         Behavior: Behavior normal.     Vitals:/81   Pulse 84   Temp 98.8  F (37.1  C)   Resp 18   Ht 1.829 m (6')   Wt 84.8 kg (187 lb)   SpO2 93%   BMI 25.36 kg/m   and Body mass index is 25.36 kg/m .    Lab/Diagnostic data:   Recent Results (from the past 240 hours)   Glucose by meter    Collection Time: 11/23/24 12:04 PM   Result Value Ref Range    GLUCOSE BY METER POCT 175 (H) 70 - 99 mg/dL   Ionized Calcium    Collection Time: 11/23/24  4:04 PM   Result Value Ref Range    Calcium Ionized Whole Blood 4.4 4.4 - 5.2 mg/dL   Glucose by meter    Collection Time: 11/23/24  5:15 PM   Result Value Ref Range    GLUCOSE BY METER POCT 135 (H) 70 - 99 mg/dL   Glucose by meter    Collection Time: 11/23/24  8:52 PM   Result Value Ref Range    GLUCOSE BY METER POCT 186 (H) 70 - 99 mg/dL   Ionized Calcium    Collection Time: 11/23/24 10:37 PM   Result Value Ref Range    Calcium Ionized Whole Blood 4.5 4.4 - 5.2 mg/dL   Magnesium    Collection Time: 11/24/24  5:51 AM   Result Value Ref Range    Magnesium 2.1 1.7 - 2.3 mg/dL   Potassium    Collection Time: 11/24/24  5:51 AM   Result Value Ref Range    Potassium 3.8 3.4 - 5.3 mmol/L   Extra Purple Top EDTA (LAB USE ONLY)    Collection Time: 11/24/24  5:51 AM   Result Value Ref Range    Hold Specimen JIC    Glucose by meter    Collection Time: 11/24/24  7:26 AM   Result Value Ref Range    GLUCOSE BY METER POCT 153 (H) 70 - 99 mg/dL   Echocardiogram Limited    Collection Time: 11/24/24 11:45 AM   Result Value Ref Range    LVEF  20-25% (severely reduced)    Glucose by meter    Collection Time: 11/24/24 12:26 PM   Result Value Ref Range    GLUCOSE BY METER POCT 144 (H) 70 - 99 mg/dL   Glucose by meter    Collection Time: 11/24/24  4:37 PM   Result Value Ref Range    GLUCOSE BY METER POCT 194 (H)  70 - 99 mg/dL   Glucose by meter    Collection Time: 11/24/24  9:04 PM   Result Value Ref Range    GLUCOSE BY METER POCT 165 (H) 70 - 99 mg/dL   Magnesium    Collection Time: 11/25/24  4:33 AM   Result Value Ref Range    Magnesium 2.3 1.7 - 2.3 mg/dL   Potassium    Collection Time: 11/25/24  4:33 AM   Result Value Ref Range    Potassium 3.9 3.4 - 5.3 mmol/L   Extra Purple Top EDTA (LAB USE ONLY)    Collection Time: 11/25/24  4:33 AM   Result Value Ref Range    Hold Specimen JIC    Glucose by meter    Collection Time: 11/25/24  7:57 AM   Result Value Ref Range    GLUCOSE BY METER POCT 163 (H) 70 - 99 mg/dL   Glucose by meter    Collection Time: 11/25/24 11:51 AM   Result Value Ref Range    GLUCOSE BY METER POCT 208 (H) 70 - 99 mg/dL   Glucose by meter    Collection Time: 11/25/24  5:14 PM   Result Value Ref Range    GLUCOSE BY METER POCT 140 (H) 70 - 99 mg/dL   Glucose by meter    Collection Time: 11/25/24  9:07 PM   Result Value Ref Range    GLUCOSE BY METER POCT 167 (H) 70 - 99 mg/dL   Glucose by meter    Collection Time: 11/26/24  2:21 AM   Result Value Ref Range    GLUCOSE BY METER POCT 136 (H) 70 - 99 mg/dL   Potassium    Collection Time: 11/26/24  4:22 AM   Result Value Ref Range    Potassium 3.6 3.4 - 5.3 mmol/L   Magnesium    Collection Time: 11/26/24  4:22 AM   Result Value Ref Range    Magnesium 2.2 1.7 - 2.3 mg/dL   Basic metabolic panel    Collection Time: 11/26/24  4:22 AM   Result Value Ref Range    Sodium 141 135 - 145 mmol/L    Potassium 3.6 3.4 - 5.3 mmol/L    Chloride 105 98 - 107 mmol/L    Carbon Dioxide (CO2) 26 22 - 29 mmol/L    Anion Gap 10 7 - 15 mmol/L    Urea Nitrogen 24.3 (H) 8.0 - 23.0 mg/dL    Creatinine 1.35 (H) 0.67 - 1.17 mg/dL    GFR Estimate 53 (L) >60 mL/min/1.73m2    Calcium 8.3 (L) 8.8 - 10.4 mg/dL    Glucose 128 (H) 70 - 99 mg/dL   Glucose by meter    Collection Time: 11/26/24  7:37 AM   Result Value Ref Range    GLUCOSE BY METER POCT 160 (H) 70 - 99 mg/dL   Glucose by meter     Collection Time: 11/26/24 11:15 AM   Result Value Ref Range    GLUCOSE BY METER POCT 169 (H) 70 - 99 mg/dL   Glucose by meter    Collection Time: 11/26/24  4:06 PM   Result Value Ref Range    GLUCOSE BY METER POCT 82 70 - 99 mg/dL   ECG 12-LEAD WITH MUSE (LHE)    Collection Time: 11/29/24  9:21 AM   Result Value Ref Range    Systolic Blood Pressure 150 mmHg    Diastolic Blood Pressure 88 mmHg    Ventricular Rate 92 BPM    Atrial Rate 92 BPM    MD Interval 190 ms    QRS Duration 112 ms     ms    QTc 457 ms    P Axis 87 degrees    R AXIS -32 degrees    T Axis 102 degrees    Interpretation ECG       Sinus rhythm with Premature supraventricular complexes  Left axis deviation  Pulmonary disease pattern  T wave abnormality, consider lateral ischemia  Abnormal ECG  When compared with ECG of 29-Nov-2024 09:12,  Premature supraventricular complexes are now Present  Confirmed by SEE ED PROVIDER NOTE FOR, ECG INTERPRETATION ([id number]),  BESSIE CONDON ([id number]) on 12/1/2024 12:02:36 AM     Basic metabolic panel    Collection Time: 11/29/24  9:30 AM   Result Value Ref Range    Sodium 142 135 - 145 mmol/L    Potassium 4.3 3.4 - 5.3 mmol/L    Chloride 106 98 - 107 mmol/L    Carbon Dioxide (CO2) 24 22 - 29 mmol/L    Anion Gap 12 7 - 15 mmol/L    Urea Nitrogen 18.4 8.0 - 23.0 mg/dL    Creatinine 1.53 (H) 0.67 - 1.17 mg/dL    GFR Estimate 46 (L) >60 mL/min/1.73m2    Calcium 8.4 (L) 8.8 - 10.4 mg/dL    Glucose 135 (H) 70 - 99 mg/dL   Troponin T, High Sensitivity    Collection Time: 11/29/24  9:30 AM   Result Value Ref Range    Troponin T, High Sensitivity 91 (H) <=22 ng/L   INR    Collection Time: 11/29/24  9:30 AM   Result Value Ref Range    INR 1.23 (H) 0.85 - 1.15   Partial thromboplastin time    Collection Time: 11/29/24  9:30 AM   Result Value Ref Range    aPTT 34 22 - 38 Seconds   CBC with platelets and differential    Collection Time: 11/29/24  9:30 AM   Result Value Ref Range    WBC Count 16.8 (H) 4.0 - 11.0 10e3/uL     RBC Count 3.86 (L) 4.40 - 5.90 10e6/uL    Hemoglobin 10.9 (L) 13.3 - 17.7 g/dL    Hematocrit 35.4 (L) 40.0 - 53.0 %    MCV 92 78 - 100 fL    MCH 28.2 26.5 - 33.0 pg    MCHC 30.8 (L) 31.5 - 36.5 g/dL    RDW 16.2 (H) 10.0 - 15.0 %    Platelet Count 395 150 - 450 10e3/uL    % Neutrophils 85 %    % Lymphocytes 7 %    % Monocytes 7 %    % Eosinophils 0 %    % Basophils 1 %    % Immature Granulocytes 1 %    NRBCs per 100 WBC 0 <1 /100    Absolute Neutrophils 14.2 (H) 1.6 - 8.3 10e3/uL    Absolute Lymphocytes 1.2 0.8 - 5.3 10e3/uL    Absolute Monocytes 1.2 0.0 - 1.3 10e3/uL    Absolute Eosinophils 0.1 0.0 - 0.7 10e3/uL    Absolute Basophils 0.1 0.0 - 0.2 10e3/uL    Absolute Immature Granulocytes 0.1 <=0.4 10e3/uL    Absolute NRBCs 0.0 10e3/uL   Troponin T, High Sensitivity    Collection Time: 11/29/24 12:08 PM   Result Value Ref Range    Troponin T, High Sensitivity 86 (H) <=22 ng/L   Glucose (OUTREACH)    Collection Time: 12/02/24  9:44 AM   Result Value Ref Range    Glucose 202 (H) 70 - 99 mg/dL   Basic Metabolic Panel No Glucose (OUTREACH)    Collection Time: 12/02/24  9:44 AM   Result Value Ref Range    Sodium 140 135 - 145 mmol/L    Potassium 4.2 3.4 - 5.3 mmol/L    Chloride 103 98 - 107 mmol/L    Carbon Dioxide (CO2) 21 (L) 22 - 29 mmol/L    Anion Gap 16 (H) 7 - 15 mmol/L    Urea Nitrogen 23.8 (H) 8.0 - 23.0 mg/dL    Creatinine 1.39 (H) 0.67 - 1.17 mg/dL    GFR Estimate 51 (L) >60 mL/min/1.73m2    Calcium 8.7 (L) 8.8 - 10.4 mg/dL   CBC with platelets and differential    Collection Time: 12/02/24  9:44 AM   Result Value Ref Range    WBC Count 13.2 (H) 4.0 - 11.0 10e3/uL    RBC Count 3.78 (L) 4.40 - 5.90 10e6/uL    Hemoglobin 10.7 (L) 13.3 - 17.7 g/dL    Hematocrit 35.5 (L) 40.0 - 53.0 %    MCV 94 78 - 100 fL    MCH 28.3 26.5 - 33.0 pg    MCHC 30.1 (L) 31.5 - 36.5 g/dL    RDW 16.0 (H) 10.0 - 15.0 %    Platelet Count 443 150 - 450 10e3/uL    % Neutrophils 81 %    % Lymphocytes 10 %    % Monocytes 7 %    % Eosinophils 1  %    % Basophils 1 %    % Immature Granulocytes 1 %    NRBCs per 100 WBC 0 <1 /100    Absolute Neutrophils 10.6 (H) 1.6 - 8.3 10e3/uL    Absolute Lymphocytes 1.3 0.8 - 5.3 10e3/uL    Absolute Monocytes 0.9 0.0 - 1.3 10e3/uL    Absolute Eosinophils 0.2 0.0 - 0.7 10e3/uL    Absolute Basophils 0.1 0.0 - 0.2 10e3/uL    Absolute Immature Granulocytes 0.1 <=0.4 10e3/uL    Absolute NRBCs 0.0 10e3/uL        MEDICATIONS:  MED REC REQUIRED  Post Medication Reconciliation Status: discharge medications reconciled, continue medications without change          Review of your medicines            Accurate as of December 3, 2024 10:31 AM. If you have any questions, ask your nurse or doctor.                CONTINUE these medicines which may have CHANGED, or have new prescriptions. If we are uncertain of the size of tablets/capsules you have at home, strength may be listed as something that might have changed.        Dose / Directions   acetaminophen 500 MG tablet  Commonly known as: TYLENOL  This may have changed: Another medication with the same name was removed. Continue taking this medication, and follow the directions you see here.      Dose: 1,000 mg  Take 1,000 mg by mouth 3 times daily. TID while awake and PRN nightly  Refills: 0            CONTINUE these medicines which have NOT CHANGED        Dose / Directions   allopurinol 100 MG tablet  Commonly known as: ZYLOPRIM      Dose: 100 mg  Take 100 mg by mouth daily.  Refills: 0     aspirin 81 MG chewable tablet  Commonly known as: ASA  Used for: S/P CABG x 3      Dose: 162 mg  Take 2 tablets (162 mg) by mouth daily.  Refills: 0     atorvastatin 40 MG tablet  Commonly known as: LIPITOR  Used for: S/P CABG x 3      Dose: 40 mg  Take 1 tablet (40 mg) by mouth every evening.  Refills: 0     bumetanide 1 MG tablet  Commonly known as: BUMEX  Used for: S/P CABG x 3      Dose: 1 mg  Take 1 tablet (1 mg) by mouth daily.  Refills: 0     Ferrous Gluconate 324 (37.5 Fe) MG Tabs      Dose:  1 tablet  Take 1 tablet by mouth daily.  Refills: 0     finasteride 5 MG tablet  Commonly known as: PROSCAR      Dose: 5 mg  Take 5 mg by mouth daily  Refills: 0     gabapentin 300 MG capsule  Commonly known as: NEURONTIN  Used for: S/P CABG x 3      Dose: 300 mg  Take 1 capsule (300 mg) by mouth 3 times daily as needed.  Refills: 0     glipiZIDE 2.5 MG 24 hr tablet  Commonly known as: GLUCOTROL XL  Used for: S/P CABG x 3      Dose: 2.5 mg  Take 1 tablet (2.5 mg) by mouth daily.  Refills: 0     insulin glargine 100 UNIT/ML pen  Commonly known as: LANTUS PEN  Used for: S/P CABG x 3      Dose: 10 Units  Inject 10 Units subcutaneously 2 times daily.  Refills: 0     LubriFresh P.M. Oint  Used for: S/P CABG x 3      Dose: 1 Application  Apply 1 g to eye 2 times daily as needed for dry eyes.  Refills: 0     metoprolol succinate  MG 24 hr tablet  Commonly known as: TOPROL XL  Used for: S/P CABG x 3      Dose: 100 mg  Take 1 tablet (100 mg) by mouth daily.  Refills: 0     pantoprazole 40 MG EC tablet  Commonly known as: PROTONIX  Used for: S/P CABG x 3      Dose: 40 mg  Take 1 tablet (40 mg) by mouth every morning (before breakfast).  Refills: 0     predniSONE 20 MG tablet  Commonly known as: DELTASONE      Dose: 40 mg  Take 40 mg by mouth daily.  Refills: 0     sodium bicarbonate 650 MG tablet  Used for: S/P CABG x 3      Dose: 1,950 mg  Take 3 tablets (1,950 mg) by mouth 3 times daily.  Refills: 0     spironolactone 25 MG tablet  Commonly known as: ALDACTONE  Used for: S/P CABG x 3      Dose: 25 mg  Take 1 tablet (25 mg) by mouth daily.  Refills: 0     traZODone 50 MG tablet  Commonly known as: DESYREL      Dose: 25 mg  Take 25 mg by mouth at bedtime.  Refills: 0     Tums 500 MG chewable tablet  Generic drug: calcium carbonate      Dose: 1,000 mg  Take 1,000 mg by mouth 3 times daily as needed for heartburn  Refills: 0     ursodiol 300 MG capsule  Commonly known as: ACTIGALL  Used for: Biliary stricture (H)       Dose: 300 mg  Take 1 capsule (300 mg) by mouth 2 times daily  Quantity: 60 capsule  Refills: 0            STOP taking      melatonin 5 MG tablet                 ASSESSMENT/PLAN  Encounter Diagnoses   Name Primary?     Insomnia, unspecified type Yes     Pain management      Possible gout left elbow colchicine x 2 doses, prednisone 40 mg x 5 days check labs     CABG x3 Follow up with cardiology, Pain management Life vest, ASA    Pain management as needed Tylenol, ,  spinal cord stimulator, Gabapentin     HDL on Atorvastatin    Physical deconditioning  PT/OT    Gout allopurinol    CHF- daily weights, Bumex Spironolactone     Diabetes type 2 continue glipizide XL 2.5 mg daily, glargine 10 units subcu twice daily,     Hypertension Metoprolol Succinate     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

## 2024-12-03 NOTE — TELEPHONE ENCOUNTER
"[facility] Lab Note     Provider: ZACH Mckenzie  Facility: [facility]  Facility Type:  TCU    Allergies   Allergen Reactions    Ancef [Cefazolin] Rash    Cephalexin Itching and Rash     Allergic reaction required hospitalization 4/2024    Penicillins Anaphylaxis    Sulfa Antibiotics      \"itchy rash, swelling of face and hands\"       Labs Reviewed by provider: Heme 2, BMP, Mg, uric acid     Verbal Order/Direction given by Provider: Check CBC on 12/6/24.      Provider giving Order:  ZACH Mckenzie    Verbal Order given to: Rogelio Brewer RN  "

## 2024-12-03 NOTE — PROGRESS NOTES
[facility] GERIATRIC SERVICES  Chief Complaint   Patient presents with    CHARLEE     [facility] Medical Record Number:  [id number]  Place of Service where encounter took place:  [facility] ([facility]) [[id number]]  Code Status:  Full Code     HISTORY:      HPI:  Lance Ibrahim  is 79 year old (1945) undergoing physical and occupational therapy.    He is with past medical history significant for HFpEF, diabetes, gout,  Excerpted from records  who presented with a STEMI on 11/14/24.  Subsequent coronary angiography revealed very tight left main disease and a totally occluded RCA. Over the weekend he had chest pain at rest and an intra-aortic balloon pump was placed. He was referred to CV surgery for evaluation for possible coronary artery revascularization.      Patient was deemed a candidate for coronary artery bypass surgery, and was taken to the operating room on 11/18/24 where patient underwent 3 vessel coronary artery bypass and endoscopic vein harvest from the left leg. Surgery was uneventful and patient was brought to the ICU post-operatively.IABP was removed POD#1. He was extubated on POD#2 and weaned from pressors. Patient was awake and alert, afebrile, and with stable vitals. Insulin drip was discontinued and he was transitioned to a sliding scale. He was transferred to general telemetry status on POD#4 where patient has had return of bowel function, is maintaining oxygen saturations on room air, had his chest tubes removed, and has no complaints of chest pain or shortness of breath. On 11/26/24, patient was stable enough to be discharged to TCU.     Of note, postop echo showed EF 25%, therefore a Lifevest was ordered and place on pt.before discharge to TCU. Lifevest ordered for 3 mos or until heart failure discontinues it. Heart failure was consulted and appropriate medications were prescribed with follow up appointments.     Today he is seen to review VS, routine visit and to follow up recent ER. Pt  sent to the ER on 11/29 after reports of feeling like a small elephant on his chest.  Full cardiac workup with twelve-lead EKG showing no acute ischemic changes, he received relief with nitro x 1.  He then returned to the TCU.  Today he denied chest pain shortness of breath cough or congestion.  He is status post CABG x 3.   Currently wearing a LifeVest and writer assisted with changing the battery.  Per his report he has not walked in months due to bone-on-bone left knee.  Pain is controlled with current medications.  Today his main concern was a possible gout flareup left elbow he was given colchicine x 2 doses and started on prednisone 40 mg daily x 5 days.  Labs pending for a.m.  Weights reviewed he is down 9.4 pounds over the last 4 days.  Midline surgical incision clean dry and intact open to air, left medial left leg incisions clean dry intact open to air.    ALLERGIES:Ancef [cefazolin], Cephalexin, Penicillins, and Sulfa antibiotics    PAST MEDICAL HISTORY:   Past Medical History:   Diagnosis Date    Anemia     Arthritis     osteoarthritis knees    BPH     CAD (coronary artery disease)     subtotal occlusion in the small distal LAD     Cardiomyopathy     Cerebral artery occlusion with cerebral infarction     TIA 1993, no residual    Cervicalgia     CHF (congestive heart failure) (H)     Chronic kidney disease     Chronic low back pain     Chronic rhinitis     Gouty arthropathy     hands    Hyperlipidemia     Hypertension     Kidney stone     Nocturia     Obesity     Osteomyelitis (H) 08/2023    Foot    PVD (peripheral vascular disease)     Sleep apnea     doesn't use cpap    Spinal cord stimulator status 04/2024    Spinal cord stimulator in place    TIA (transient ischaemic attack) 1993    Type 2 diabetes mellitus - 11/08/2018    Ureteral stone        PAST SURGICAL HISTORY:   has a past surgical history that includes Diastasis recti repair (1985); Ventral hernia repair NOS (1987); ORIF Shoulder (Left);  Colonoscopy (03/09/2013); hernia repair (07/13/2004); Foot surgery (04/2013); Amputate toe(s) (Left, 10/15/2018); Arthroplasty knee (Right, 12/07/2018); Heart Catheterization with Possible Intervention (Left, 03/05/2019); Extracapsular cataract extration with intraocular lens implant (Left, 03/13/2017); Extracapsular cataract extration with intraocular lens implant (Right); Amputate foot (Right, 08/07/2023); Esophagoscopy, gastroscopy, duodenoscopy (EGD), combined (N/A, 04/30/2024); Endoscopic retrograde cholangiopancreatogram (N/A, 04/30/2024); Spinal Cord Stimulator Implant (04/03/2024); Prostate surgery (02/2024); IR Disc Aspriation Injection (6/19/2024); Irrigation and debridement shoulder, combined (Right, 8/7/2024); Resect clavicle distal (Right, 8/7/2024); Irrigation and debridement upper extremity, combined (Right, 8/7/2024); Coronary Angiogram (N/A, 11/14/2024); Intravascular Ultrasound (N/A, 11/14/2024); Left Heart Catheterization (N/A, 11/14/2024); Intra aortic Balloon Pump Insertion (N/A, 11/16/2024); Coronary Artery Bypass Graft, With Endoscopic Vessel Procurement (N/A, 11/18/2024); and Transesophageal echocardiogram intraoperative (N/A, 11/18/2024).    FAMILY HISTORY: family history includes Alcohol/Drug in his paternal grandfather; Cancer in his father; Diabetes in his maternal grandfather; Family History Negative in his mother.    SOCIAL HISTORY:  reports that he quit smoking about 31 years ago. His smoking use included cigarettes. He has never been exposed to tobacco smoke. He has never used smokeless tobacco. He reports that he does not currently use alcohol. He reports that he does not use drugs.    ROS:  Constitutional: Negative for activity change, appetite change, fatigue and fever. Hasn't ambulated in months due to bone on bone left knee   HENT: Negative for congestion.    Respiratory: Negative for cough, shortness of breath and wheezing.    Cardiovascular: Negative for chest pain and leg  swelling. Chest pressure   Gastrointestinal: Negative for abdominal distention, abdominal pain, constipation, diarrhea and nausea.   Genitourinary: Negative for dysuria.   Musculoskeletal:   Skin: Negative for color change and wound.  Midline surgical incision, surgical wounds left medial leg   Neurological: Negative for dizziness.   Psychiatric/Behavioral: Negative for agitation, behavioral problems and confusion.     Physical Exam:  Constitutional:       Appearance: Patient is well-developed.   HENT:      Head: Normocephalic.   Eyes:      Conjunctiva/sclera: Conjunctivae normal.   Neck:      Musculoskeletal: Normal range of motion.   Cardiovascular:      Rate and Rhythm: Normal rate and regular rhythm.      Heart sounds: Normal heart sounds. No murmur. Wearing a life vest   Pulmonary:      Effort: No respiratory distress.      Breath sounds: Normal breath sounds.   no wheezing or rales.   Abdominal:      General: Bowel sounds are normal. There is no distension.      Palpations: Abdomen is soft.      Tenderness: There is no abdominal tenderness.   Musculoskeletal:       Normal range of motion.  not ambulating per his report   Skin:General:        Skin is warm. Midline chest incision C/D/I TANVI, medial left leg incision   Neurological:         Mental Status: Patient is alert and oriented to person, place, and time.   Psychiatric:         Behavior: Behavior normal.     Vitals:/81   Pulse 84   Temp 98.8  F (37.1  C)   Resp 18   Ht 1.829 m (6')   Wt 85.4 kg (188 lb 3.2 oz)   SpO2 93%   BMI 25.52 kg/m   and Body mass index is 25.52 kg/m .    Lab/Diagnostic data:   Recent Results (from the past 240 hours)   Ionized Calcium    Collection Time: 11/23/24  5:39 AM   Result Value Ref Range    Calcium Ionized Whole Blood 4.4 4.4 - 5.2 mg/dL   Phosphorus    Collection Time: 11/23/24  5:39 AM   Result Value Ref Range    Phosphorus 2.3 (L) 2.5 - 4.5 mg/dL   Magnesium    Collection Time: 11/23/24  5:39 AM   Result  Value Ref Range    Magnesium 2.1 1.7 - 2.3 mg/dL   Potassium    Collection Time: 11/23/24  5:39 AM   Result Value Ref Range    Potassium 3.7 3.4 - 5.3 mmol/L   Extra Purple Top EDTA (LAB USE ONLY)    Collection Time: 11/23/24  5:39 AM   Result Value Ref Range    Hold Specimen [facility]    CBC with platelets    Collection Time: 11/23/24  5:39 AM   Result Value Ref Range    WBC Count 10.7 4.0 - 11.0 10e3/uL    RBC Count 3.83 (L) 4.40 - 5.90 10e6/uL    Hemoglobin 10.9 (L) 13.3 - 17.7 g/dL    Hematocrit 35.4 (L) 40.0 - 53.0 %    MCV 92 78 - 100 fL    MCH 28.5 26.5 - 33.0 pg    MCHC 30.8 (L) 31.5 - 36.5 g/dL    RDW 16.2 (H) 10.0 - 15.0 %    Platelet Count 306 150 - 450 10e3/uL   Basic metabolic panel    Collection Time: 11/23/24  5:39 AM   Result Value Ref Range    Sodium 145 135 - 145 mmol/L    Potassium 3.7 3.4 - 5.3 mmol/L    Chloride 108 (H) 98 - 107 mmol/L    Carbon Dioxide (CO2) 25 22 - 29 mmol/L    Anion Gap 12 7 - 15 mmol/L    Urea Nitrogen 35.9 (H) 8.0 - 23.0 mg/dL    Creatinine 1.55 (H) 0.67 - 1.17 mg/dL    GFR Estimate 45 (L) >60 mL/min/1.73m2    Calcium 8.2 (L) 8.8 - 10.4 mg/dL    Glucose 103 (H) 70 - 99 mg/dL   Glucose by meter    Collection Time: 11/23/24  7:39 AM   Result Value Ref Range    GLUCOSE BY METER POCT 113 (H) 70 - 99 mg/dL   Glucose by meter    Collection Time: 11/23/24 12:04 PM   Result Value Ref Range    GLUCOSE BY METER POCT 175 (H) 70 - 99 mg/dL   Ionized Calcium    Collection Time: 11/23/24  4:04 PM   Result Value Ref Range    Calcium Ionized Whole Blood 4.4 4.4 - 5.2 mg/dL   Glucose by meter    Collection Time: 11/23/24  5:15 PM   Result Value Ref Range    GLUCOSE BY METER POCT 135 (H) 70 - 99 mg/dL   Glucose by meter    Collection Time: 11/23/24  8:52 PM   Result Value Ref Range    GLUCOSE BY METER POCT 186 (H) 70 - 99 mg/dL   Ionized Calcium    Collection Time: 11/23/24 10:37 PM   Result Value Ref Range    Calcium Ionized Whole Blood 4.5 4.4 - 5.2 mg/dL   Magnesium    Collection Time: 11/24/24   5:51 AM   Result Value Ref Range    Magnesium 2.1 1.7 - 2.3 mg/dL   Potassium    Collection Time: 11/24/24  5:51 AM   Result Value Ref Range    Potassium 3.8 3.4 - 5.3 mmol/L   Extra Purple Top EDTA (LAB USE ONLY)    Collection Time: 11/24/24  5:51 AM   Result Value Ref Range    Hold Specimen [facility]    Glucose by meter    Collection Time: 11/24/24  7:26 AM   Result Value Ref Range    GLUCOSE BY METER POCT 153 (H) 70 - 99 mg/dL   Echocardiogram Limited    Collection Time: 11/24/24 11:45 AM   Result Value Ref Range    LVEF  20-25% (severely reduced)    Glucose by meter    Collection Time: 11/24/24 12:26 PM   Result Value Ref Range    GLUCOSE BY METER POCT 144 (H) 70 - 99 mg/dL   Glucose by meter    Collection Time: 11/24/24  4:37 PM   Result Value Ref Range    GLUCOSE BY METER POCT 194 (H) 70 - 99 mg/dL   Glucose by meter    Collection Time: 11/24/24  9:04 PM   Result Value Ref Range    GLUCOSE BY METER POCT 165 (H) 70 - 99 mg/dL   Magnesium    Collection Time: 11/25/24  4:33 AM   Result Value Ref Range    Magnesium 2.3 1.7 - 2.3 mg/dL   Potassium    Collection Time: 11/25/24  4:33 AM   Result Value Ref Range    Potassium 3.9 3.4 - 5.3 mmol/L   Extra Purple Top EDTA (LAB USE ONLY)    Collection Time: 11/25/24  4:33 AM   Result Value Ref Range    Hold Specimen [facility]    Glucose by meter    Collection Time: 11/25/24  7:57 AM   Result Value Ref Range    GLUCOSE BY METER POCT 163 (H) 70 - 99 mg/dL   Glucose by meter    Collection Time: 11/25/24 11:51 AM   Result Value Ref Range    GLUCOSE BY METER POCT 208 (H) 70 - 99 mg/dL   Glucose by meter    Collection Time: 11/25/24  5:14 PM   Result Value Ref Range    GLUCOSE BY METER POCT 140 (H) 70 - 99 mg/dL   Glucose by meter    Collection Time: 11/25/24  9:07 PM   Result Value Ref Range    GLUCOSE BY METER POCT 167 (H) 70 - 99 mg/dL   Glucose by meter    Collection Time: 11/26/24  2:21 AM   Result Value Ref Range    GLUCOSE BY METER POCT 136 (H) 70 - 99 mg/dL   Potassium     Collection Time: 11/26/24  4:22 AM   Result Value Ref Range    Potassium 3.6 3.4 - 5.3 mmol/L   Magnesium    Collection Time: 11/26/24  4:22 AM   Result Value Ref Range    Magnesium 2.2 1.7 - 2.3 mg/dL   Basic metabolic panel    Collection Time: 11/26/24  4:22 AM   Result Value Ref Range    Sodium 141 135 - 145 mmol/L    Potassium 3.6 3.4 - 5.3 mmol/L    Chloride 105 98 - 107 mmol/L    Carbon Dioxide (CO2) 26 22 - 29 mmol/L    Anion Gap 10 7 - 15 mmol/L    Urea Nitrogen 24.3 (H) 8.0 - 23.0 mg/dL    Creatinine 1.35 (H) 0.67 - 1.17 mg/dL    GFR Estimate 53 (L) >60 mL/min/1.73m2    Calcium 8.3 (L) 8.8 - 10.4 mg/dL    Glucose 128 (H) 70 - 99 mg/dL   Glucose by meter    Collection Time: 11/26/24  7:37 AM   Result Value Ref Range    GLUCOSE BY METER POCT 160 (H) 70 - 99 mg/dL   Glucose by meter    Collection Time: 11/26/24 11:15 AM   Result Value Ref Range    GLUCOSE BY METER POCT 169 (H) 70 - 99 mg/dL   Glucose by meter    Collection Time: 11/26/24  4:06 PM   Result Value Ref Range    GLUCOSE BY METER POCT 82 70 - 99 mg/dL   ECG 12-LEAD WITH MUSE (LHE)    Collection Time: 11/29/24  9:21 AM   Result Value Ref Range    Systolic Blood Pressure 150 mmHg    Diastolic Blood Pressure 88 mmHg    Ventricular Rate 92 BPM    Atrial Rate 92 BPM    DC Interval 190 ms    QRS Duration 112 ms     ms    QTc 457 ms    P Axis 87 degrees    R AXIS -32 degrees    T Axis 102 degrees    Interpretation ECG       Sinus rhythm with Premature supraventricular complexes  Left axis deviation  Pulmonary disease pattern  T wave abnormality, consider lateral ischemia  Abnormal ECG  When compared with ECG of 29-Nov-2024 09:12,  Premature supraventricular complexes are now Present  Confirmed by SEE ED PROVIDER NOTE FOR, ECG INTERPRETATION ([id number]),  BESSIE CONDON ([id number]) on 12/1/2024 12:02:36 AM     Basic metabolic panel    Collection Time: 11/29/24  9:30 AM   Result Value Ref Range    Sodium 142 135 - 145 mmol/L    Potassium 4.3 3.4 - 5.3  mmol/L    Chloride 106 98 - 107 mmol/L    Carbon Dioxide (CO2) 24 22 - 29 mmol/L    Anion Gap 12 7 - 15 mmol/L    Urea Nitrogen 18.4 8.0 - 23.0 mg/dL    Creatinine 1.53 (H) 0.67 - 1.17 mg/dL    GFR Estimate 46 (L) >60 mL/min/1.73m2    Calcium 8.4 (L) 8.8 - 10.4 mg/dL    Glucose 135 (H) 70 - 99 mg/dL   Troponin T, High Sensitivity    Collection Time: 11/29/24  9:30 AM   Result Value Ref Range    Troponin T, High Sensitivity 91 (H) <=22 ng/L   INR    Collection Time: 11/29/24  9:30 AM   Result Value Ref Range    INR 1.23 (H) 0.85 - 1.15   Partial thromboplastin time    Collection Time: 11/29/24  9:30 AM   Result Value Ref Range    aPTT 34 22 - 38 Seconds   CBC with platelets and differential    Collection Time: 11/29/24  9:30 AM   Result Value Ref Range    WBC Count 16.8 (H) 4.0 - 11.0 10e3/uL    RBC Count 3.86 (L) 4.40 - 5.90 10e6/uL    Hemoglobin 10.9 (L) 13.3 - 17.7 g/dL    Hematocrit 35.4 (L) 40.0 - 53.0 %    MCV 92 78 - 100 fL    MCH 28.2 26.5 - 33.0 pg    MCHC 30.8 (L) 31.5 - 36.5 g/dL    RDW 16.2 (H) 10.0 - 15.0 %    Platelet Count 395 150 - 450 10e3/uL    % Neutrophils 85 %    % Lymphocytes 7 %    % Monocytes 7 %    % Eosinophils 0 %    % Basophils 1 %    % Immature Granulocytes 1 %    NRBCs per 100 WBC 0 <1 /100    Absolute Neutrophils 14.2 (H) 1.6 - 8.3 10e3/uL    Absolute Lymphocytes 1.2 0.8 - 5.3 10e3/uL    Absolute Monocytes 1.2 0.0 - 1.3 10e3/uL    Absolute Eosinophils 0.1 0.0 - 0.7 10e3/uL    Absolute Basophils 0.1 0.0 - 0.2 10e3/uL    Absolute Immature Granulocytes 0.1 <=0.4 10e3/uL    Absolute NRBCs 0.0 10e3/uL   Troponin T, High Sensitivity    Collection Time: 11/29/24 12:08 PM   Result Value Ref Range    Troponin T, High Sensitivity 86 (H) <=22 ng/L   Glucose (OUTREACH)    Collection Time: 12/02/24  9:44 AM   Result Value Ref Range    Glucose 202 (H) 70 - 99 mg/dL   Basic Metabolic Panel No Glucose (OUTREACH)    Collection Time: 12/02/24  9:44 AM   Result Value Ref Range    Sodium 140 135 - 145  mmol/L    Potassium 4.2 3.4 - 5.3 mmol/L    Chloride 103 98 - 107 mmol/L    Carbon Dioxide (CO2) 21 (L) 22 - 29 mmol/L    Anion Gap 16 (H) 7 - 15 mmol/L    Urea Nitrogen 23.8 (H) 8.0 - 23.0 mg/dL    Creatinine 1.39 (H) 0.67 - 1.17 mg/dL    GFR Estimate 51 (L) >60 mL/min/1.73m2    Calcium 8.7 (L) 8.8 - 10.4 mg/dL   CBC with platelets and differential    Collection Time: 12/02/24  9:44 AM   Result Value Ref Range    WBC Count 13.2 (H) 4.0 - 11.0 10e3/uL    RBC Count 3.78 (L) 4.40 - 5.90 10e6/uL    Hemoglobin 10.7 (L) 13.3 - 17.7 g/dL    Hematocrit 35.5 (L) 40.0 - 53.0 %    MCV 94 78 - 100 fL    MCH 28.3 26.5 - 33.0 pg    MCHC 30.1 (L) 31.5 - 36.5 g/dL    RDW 16.0 (H) 10.0 - 15.0 %    Platelet Count 443 150 - 450 10e3/uL    % Neutrophils 81 %    % Lymphocytes 10 %    % Monocytes 7 %    % Eosinophils 1 %    % Basophils 1 %    % Immature Granulocytes 1 %    NRBCs per 100 WBC 0 <1 /100    Absolute Neutrophils 10.6 (H) 1.6 - 8.3 10e3/uL    Absolute Lymphocytes 1.3 0.8 - 5.3 10e3/uL    Absolute Monocytes 0.9 0.0 - 1.3 10e3/uL    Absolute Eosinophils 0.2 0.0 - 0.7 10e3/uL    Absolute Basophils 0.1 0.0 - 0.2 10e3/uL    Absolute Immature Granulocytes 0.1 <=0.4 10e3/uL    Absolute NRBCs 0.0 10e3/uL        MEDICATIONS:  MED REC REQUIRED  Post Medication Reconciliation Status: discharge medications reconciled, continue medications without change          Review of your medicines            Accurate as of December 2, 2024 10:06 PM. If you have any questions, ask your nurse or doctor.                CONTINUE these medicines which have NOT CHANGED        Dose / Directions   acetaminophen 325 MG tablet  Commonly known as: TYLENOL  Used for: S/P CABG x 3      Dose: 650 mg  Take 2 tablets (650 mg) by mouth every 4 hours as needed for other (For optimal non-opioid multimodal pain management to improve pain control.).  Refills: 0     allopurinol 100 MG tablet  Commonly known as: ZYLOPRIM      Dose: 100 mg  Take 100 mg by mouth  daily.  Refills: 0     aspirin 81 MG chewable tablet  Commonly known as: ASA  Used for: S/P CABG x 3      Dose: 162 mg  Take 2 tablets (162 mg) by mouth daily.  Refills: 0     atorvastatin 40 MG tablet  Commonly known as: LIPITOR  Used for: S/P CABG x 3      Dose: 40 mg  Take 1 tablet (40 mg) by mouth every evening.  Refills: 0     bumetanide 1 MG tablet  Commonly known as: BUMEX  Used for: S/P CABG x 3      Dose: 1 mg  Take 1 tablet (1 mg) by mouth daily.  Refills: 0     Ferrous Gluconate 324 (37.5 Fe) MG Tabs      Dose: 1 tablet  Take 1 tablet by mouth daily.  Refills: 0     finasteride 5 MG tablet  Commonly known as: PROSCAR      Dose: 5 mg  Take 5 mg by mouth daily  Refills: 0     gabapentin 300 MG capsule  Commonly known as: NEURONTIN  Used for: S/P CABG x 3      Dose: 300 mg  Take 1 capsule (300 mg) by mouth 3 times daily as needed.  Refills: 0     glipiZIDE 2.5 MG 24 hr tablet  Commonly known as: GLUCOTROL XL  Used for: S/P CABG x 3      Dose: 2.5 mg  Take 1 tablet (2.5 mg) by mouth daily.  Refills: 0     insulin glargine 100 UNIT/ML pen  Commonly known as: LANTUS PEN  Used for: S/P CABG x 3      Dose: 10 Units  Inject 10 Units subcutaneously 2 times daily.  Refills: 0     LubriFresh P.M. Oint  Used for: S/P CABG x 3      Dose: 1 Application  Apply 1 g to eye 2 times daily as needed for dry eyes.  Refills: 0     melatonin 5 MG tablet  Used for: S/P CABG x 3      Dose: 5 mg  Take 1 tablet (5 mg) by mouth nightly as needed for sleep.  Refills: 0     metoprolol succinate  MG 24 hr tablet  Commonly known as: TOPROL XL  Used for: S/P CABG x 3      Dose: 100 mg  Take 1 tablet (100 mg) by mouth daily.  Refills: 0     pantoprazole 40 MG EC tablet  Commonly known as: PROTONIX  Used for: S/P CABG x 3      Dose: 40 mg  Take 1 tablet (40 mg) by mouth every morning (before breakfast).  Refills: 0     predniSONE 20 MG tablet  Commonly known as: DELTASONE      Dose: 40 mg  Start taking on: December 3, 2024  Take 40 mg  by mouth daily.  Refills: 0     sodium bicarbonate 650 MG tablet  Used for: S/P CABG x 3      Dose: 1,950 mg  Take 3 tablets (1,950 mg) by mouth 3 times daily.  Refills: 0     spironolactone 25 MG tablet  Commonly known as: ALDACTONE  Used for: S/P CABG x 3      Dose: 25 mg  Take 1 tablet (25 mg) by mouth daily.  Refills: 0     Tums 500 MG chewable tablet  Generic drug: calcium carbonate      Dose: 1,000 mg  Take 1,000 mg by mouth 3 times daily as needed for heartburn  Refills: 0     ursodiol 300 MG capsule  Commonly known as: ACTIGALL  Used for: Biliary stricture (H)      Dose: 300 mg  Take 1 capsule (300 mg) by mouth 2 times daily  Quantity: 60 capsule  Refills: 0              ASSESSMENT/PLAN  Encounter Diagnoses   Name Primary?    NSTEMI (non-ST elevated myocardial infarction) (H) Yes    Physical deconditioning     Pain management     Acute gout of left elbow, unspecified cause      Possible gout left elbow colchicine x 2 doses, prednisone 40 mg x 5 days check labs in the a.m.    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

## 2024-12-03 NOTE — PROGRESS NOTES
[facility] GERIATRIC SERVICES  Chief Complaint   Patient presents with    CHARLEE     [place] Medical Record Number:  [id number]  Place of Service where encounter took place:  [facility] ([facility]) [[id number]]  Code Status:  Full Code     HISTORY:      HPI:  Lance Ibrahim  is 79 year old (1945) undergoing physical and occupational therapy.    He is with past medical history significant for HFpEF, diabetes, gout,  Excerpted from records  who presented with a STEMI on 11/14/24.  Subsequent coronary angiography revealed very tight left main disease and a totally occluded RCA. Over the weekend he had chest pain at rest and an intra-aortic balloon pump was placed. He was referred to CV surgery for evaluation for possible coronary artery revascularization.      Patient was deemed a candidate for coronary artery bypass surgery, and was taken to the operating room on 11/18/24 where patient underwent 3 vessel coronary artery bypass and endoscopic vein harvest from the left leg. Surgery was uneventful and patient was brought to the ICU post-operatively.IABP was removed POD#1. He was extubated on POD#2 and weaned from pressors. Patient was awake and alert, afebrile, and with stable vitals. Insulin drip was discontinued and he was transitioned to a sliding scale. He was transferred to general telemetry status on POD#4 where patient has had return of bowel function, is maintaining oxygen saturations on room air, had his chest tubes removed, and has no complaints of chest pain or shortness of breath. On 11/26/24, patient was stable enough to be discharged to TCU.     Of note, postop echo showed EF 25%, therefore a Lifevest was ordered and place on pt.before discharge to TCU. Lifevest ordered for 3 mos or until heart failure discontinues it. Heart failure was consulted and appropriate medications were prescribed with follow up appointments.     Today he is seen to review VS, routine visit and medication encounter.   "Patient rate reports not sleeping well at night however he did not sleep \"good\" last night.  He was on as needed melatonin in which the nurses have been giving him.  He does not feel it works well it was discontinued and he was started on 25 mg of trazodone at bedtime.  He also requested to have his regular strength Tylenol changed to extra strength and this was completed for him.  He was recently sent to the ER on 11/29 after reports of feeling like a small elephant on his chest.  Full cardiac workup with twelve-lead EKG showing no acute ischemic changes, he received relief with nitro x 1.  He then returned to the TCU.  Today he denied chest pain shortness of breath cough or congestion.  He is status post CABG x 3.   Currently wearing a LifeVest and writer assisted with changing the battery.  Per his report he has not walked in months due to bone-on-bone left knee.  Pain is controlled with current medications.  He continues to have a possible gout flareup left elbow he was given colchicine x 2 doses and started on prednisone 40 mg daily x 5 days.  However he will receive his first dose of prednisone today.  Labs pending  for today. Weights reviewed he is down 9.4 pounds over the last 4 days.  Midline surgical incision clean dry and intact open to air, left medial left leg incisions clean dry intact open to air.  They writer assisted him with charging his stimulator on his right buttock.  It appears his batteries have not been working well for his LifeVest and writer spoke with nursing who will follow-up this morning with the company.    ALLERGIES:Ancef [cefazolin], Cephalexin, Penicillins, and Sulfa antibiotics    PAST MEDICAL HISTORY:   Past Medical History:   Diagnosis Date    Anemia     Arthritis     osteoarthritis knees    BPH     CAD (coronary artery disease)     subtotal occlusion in the small distal LAD     Cardiomyopathy     Cerebral artery occlusion with cerebral infarction     TIA 1993, no residual    " Cervicalgia     CHF (congestive heart failure) (H)     Chronic kidney disease     Chronic low back pain     Chronic rhinitis     Gouty arthropathy     hands    Hyperlipidemia     Hypertension     Kidney stone     Nocturia     Obesity     Osteomyelitis (H) 08/2023    Foot    PVD (peripheral vascular disease)     Sleep apnea     doesn't use cpap    Spinal cord stimulator status 04/2024    Spinal cord stimulator in place    TIA (transient ischaemic attack) 1993    Type 2 diabetes mellitus - 11/08/2018    Ureteral stone        PAST SURGICAL HISTORY:   has a past surgical history that includes Diastasis recti repair (1985); Ventral hernia repair NOS (1987); ORIF Shoulder (Left); Colonoscopy (03/09/2013); hernia repair (07/13/2004); Foot surgery (04/2013); Amputate toe(s) (Left, 10/15/2018); Arthroplasty knee (Right, 12/07/2018); Heart Catheterization with Possible Intervention (Left, 03/05/2019); Extracapsular cataract extration with intraocular lens implant (Left, 03/13/2017); Extracapsular cataract extration with intraocular lens implant (Right); Amputate foot (Right, 08/07/2023); Esophagoscopy, gastroscopy, duodenoscopy (EGD), combined (N/A, 04/30/2024); Endoscopic retrograde cholangiopancreatogram (N/A, 04/30/2024); Spinal Cord Stimulator Implant (04/03/2024); Prostate surgery (02/2024); IR Disc Aspriation Injection (6/19/2024); Irrigation and debridement shoulder, combined (Right, 8/7/2024); Resect clavicle distal (Right, 8/7/2024); Irrigation and debridement upper extremity, combined (Right, 8/7/2024); Coronary Angiogram (N/A, 11/14/2024); Intravascular Ultrasound (N/A, 11/14/2024); Left Heart Catheterization (N/A, 11/14/2024); Intra aortic Balloon Pump Insertion (N/A, 11/16/2024); Coronary Artery Bypass Graft, With Endoscopic Vessel Procurement (N/A, 11/18/2024); and Transesophageal echocardiogram intraoperative (N/A, 11/18/2024).    FAMILY HISTORY: family history includes Alcohol/Drug in his paternal grandfather;  Cancer in his father; Diabetes in his maternal grandfather; Family History Negative in his mother.    SOCIAL HISTORY:  reports that he quit smoking about 31 years ago. His smoking use included cigarettes. He has never been exposed to tobacco smoke. He has never used smokeless tobacco. He reports that he does not currently use alcohol. He reports that he does not use drugs.    ROS:  Constitutional: Negative for activity change, appetite change, fatigue and fever. Hasn't ambulated in months due to bone on bone left knee   HENT: Negative for congestion.    Respiratory: Negative for cough, shortness of breath and wheezing.    Cardiovascular: Negative for chest pain and leg swelling. Chest pressure   Gastrointestinal: Negative for abdominal distention, abdominal pain, constipation, diarrhea and nausea.   Genitourinary: Negative for dysuria.   Musculoskeletal:   Skin: Negative for color change and wound.  Midline surgical incision, surgical wounds left medial leg   Neurological: Negative for dizziness.   Psychiatric/Behavioral: Negative for agitation, behavioral problems and confusion.     Physical Exam:  Constitutional:       Appearance: Patient is well-developed.   HENT:      Head: Normocephalic.   Eyes:      Conjunctiva/sclera: Conjunctivae normal.   Neck:      Musculoskeletal: Normal range of motion.   Cardiovascular:      Rate and Rhythm: Normal rate and regular rhythm.      Heart sounds: Normal heart sounds. No murmur. Wearing a life vest   Pulmonary:      Effort: No respiratory distress.      Breath sounds: Normal breath sounds.   no wheezing or rales.   Abdominal:      General: Bowel sounds are normal. There is no distension.      Palpations: Abdomen is soft.      Tenderness: There is no abdominal tenderness.   Musculoskeletal:       Normal range of motion.  not ambulating per his report   Skin:General:        Skin is warm. Midline chest incision C/D/I TANVI, medial left leg incision   Neurological:         Mental  Status: Patient is alert and oriented to person, place, and time.   Psychiatric:         Behavior: Behavior normal.     Vitals:/81   Pulse 84   Temp 98.8  F (37.1  C)   Resp 18   Ht 1.829 m (6')   Wt 84.8 kg (187 lb)   SpO2 93%   BMI 25.36 kg/m   and Body mass index is 25.36 kg/m .    Lab/Diagnostic data:   Recent Results (from the past 240 hours)   Glucose by meter    Collection Time: 11/23/24 12:04 PM   Result Value Ref Range    GLUCOSE BY METER POCT 175 (H) 70 - 99 mg/dL   Ionized Calcium    Collection Time: 11/23/24  4:04 PM   Result Value Ref Range    Calcium Ionized Whole Blood 4.4 4.4 - 5.2 mg/dL   Glucose by meter    Collection Time: 11/23/24  5:15 PM   Result Value Ref Range    GLUCOSE BY METER POCT 135 (H) 70 - 99 mg/dL   Glucose by meter    Collection Time: 11/23/24  8:52 PM   Result Value Ref Range    GLUCOSE BY METER POCT 186 (H) 70 - 99 mg/dL   Ionized Calcium    Collection Time: 11/23/24 10:37 PM   Result Value Ref Range    Calcium Ionized Whole Blood 4.5 4.4 - 5.2 mg/dL   Magnesium    Collection Time: 11/24/24  5:51 AM   Result Value Ref Range    Magnesium 2.1 1.7 - 2.3 mg/dL   Potassium    Collection Time: 11/24/24  5:51 AM   Result Value Ref Range    Potassium 3.8 3.4 - 5.3 mmol/L   Extra Purple Top EDTA (LAB USE ONLY)    Collection Time: 11/24/24  5:51 AM   Result Value Ref Range    Hold Specimen JIC    Glucose by meter    Collection Time: 11/24/24  7:26 AM   Result Value Ref Range    GLUCOSE BY METER POCT 153 (H) 70 - 99 mg/dL   Echocardiogram Limited    Collection Time: 11/24/24 11:45 AM   Result Value Ref Range    LVEF  20-25% (severely reduced)    Glucose by meter    Collection Time: 11/24/24 12:26 PM   Result Value Ref Range    GLUCOSE BY METER POCT 144 (H) 70 - 99 mg/dL   Glucose by meter    Collection Time: 11/24/24  4:37 PM   Result Value Ref Range    GLUCOSE BY METER POCT 194 (H) 70 - 99 mg/dL   Glucose by meter    Collection Time: 11/24/24  9:04 PM   Result Value Ref Range     GLUCOSE BY METER POCT 165 (H) 70 - 99 mg/dL   Magnesium    Collection Time: 11/25/24  4:33 AM   Result Value Ref Range    Magnesium 2.3 1.7 - 2.3 mg/dL   Potassium    Collection Time: 11/25/24  4:33 AM   Result Value Ref Range    Potassium 3.9 3.4 - 5.3 mmol/L   Extra Purple Top EDTA (LAB USE ONLY)    Collection Time: 11/25/24  4:33 AM   Result Value Ref Range    Hold Specimen JIC    Glucose by meter    Collection Time: 11/25/24  7:57 AM   Result Value Ref Range    GLUCOSE BY METER POCT 163 (H) 70 - 99 mg/dL   Glucose by meter    Collection Time: 11/25/24 11:51 AM   Result Value Ref Range    GLUCOSE BY METER POCT 208 (H) 70 - 99 mg/dL   Glucose by meter    Collection Time: 11/25/24  5:14 PM   Result Value Ref Range    GLUCOSE BY METER POCT 140 (H) 70 - 99 mg/dL   Glucose by meter    Collection Time: 11/25/24  9:07 PM   Result Value Ref Range    GLUCOSE BY METER POCT 167 (H) 70 - 99 mg/dL   Glucose by meter    Collection Time: 11/26/24  2:21 AM   Result Value Ref Range    GLUCOSE BY METER POCT 136 (H) 70 - 99 mg/dL   Potassium    Collection Time: 11/26/24  4:22 AM   Result Value Ref Range    Potassium 3.6 3.4 - 5.3 mmol/L   Magnesium    Collection Time: 11/26/24  4:22 AM   Result Value Ref Range    Magnesium 2.2 1.7 - 2.3 mg/dL   Basic metabolic panel    Collection Time: 11/26/24  4:22 AM   Result Value Ref Range    Sodium 141 135 - 145 mmol/L    Potassium 3.6 3.4 - 5.3 mmol/L    Chloride 105 98 - 107 mmol/L    Carbon Dioxide (CO2) 26 22 - 29 mmol/L    Anion Gap 10 7 - 15 mmol/L    Urea Nitrogen 24.3 (H) 8.0 - 23.0 mg/dL    Creatinine 1.35 (H) 0.67 - 1.17 mg/dL    GFR Estimate 53 (L) >60 mL/min/1.73m2    Calcium 8.3 (L) 8.8 - 10.4 mg/dL    Glucose 128 (H) 70 - 99 mg/dL   Glucose by meter    Collection Time: 11/26/24  7:37 AM   Result Value Ref Range    GLUCOSE BY METER POCT 160 (H) 70 - 99 mg/dL   Glucose by meter    Collection Time: 11/26/24 11:15 AM   Result Value Ref Range    GLUCOSE BY METER POCT 169 (H) 70 -  99 mg/dL   Glucose by meter    Collection Time: 11/26/24  4:06 PM   Result Value Ref Range    GLUCOSE BY METER POCT 82 70 - 99 mg/dL   ECG 12-LEAD WITH MUSE (LHE)    Collection Time: 11/29/24  9:21 AM   Result Value Ref Range    Systolic Blood Pressure 150 mmHg    Diastolic Blood Pressure 88 mmHg    Ventricular Rate 92 BPM    Atrial Rate 92 BPM    CO Interval 190 ms    QRS Duration 112 ms     ms    QTc 457 ms    P Axis 87 degrees    R AXIS -32 degrees    T Axis 102 degrees    Interpretation ECG       Sinus rhythm with Premature supraventricular complexes  Left axis deviation  Pulmonary disease pattern  T wave abnormality, consider lateral ischemia  Abnormal ECG  When compared with ECG of 29-Nov-2024 09:12,  Premature supraventricular complexes are now Present  Confirmed by SEE ED PROVIDER NOTE FOR, ECG INTERPRETATION ([id number]),  BESSIE CONDON ([id number]) on 12/1/2024 12:02:36 AM     Basic metabolic panel    Collection Time: 11/29/24  9:30 AM   Result Value Ref Range    Sodium 142 135 - 145 mmol/L    Potassium 4.3 3.4 - 5.3 mmol/L    Chloride 106 98 - 107 mmol/L    Carbon Dioxide (CO2) 24 22 - 29 mmol/L    Anion Gap 12 7 - 15 mmol/L    Urea Nitrogen 18.4 8.0 - 23.0 mg/dL    Creatinine 1.53 (H) 0.67 - 1.17 mg/dL    GFR Estimate 46 (L) >60 mL/min/1.73m2    Calcium 8.4 (L) 8.8 - 10.4 mg/dL    Glucose 135 (H) 70 - 99 mg/dL   Troponin T, High Sensitivity    Collection Time: 11/29/24  9:30 AM   Result Value Ref Range    Troponin T, High Sensitivity 91 (H) <=22 ng/L   INR    Collection Time: 11/29/24  9:30 AM   Result Value Ref Range    INR 1.23 (H) 0.85 - 1.15   Partial thromboplastin time    Collection Time: 11/29/24  9:30 AM   Result Value Ref Range    aPTT 34 22 - 38 Seconds   CBC with platelets and differential    Collection Time: 11/29/24  9:30 AM   Result Value Ref Range    WBC Count 16.8 (H) 4.0 - 11.0 10e3/uL    RBC Count 3.86 (L) 4.40 - 5.90 10e6/uL    Hemoglobin 10.9 (L) 13.3 - 17.7 g/dL    Hematocrit 35.4  (L) 40.0 - 53.0 %    MCV 92 78 - 100 fL    MCH 28.2 26.5 - 33.0 pg    MCHC 30.8 (L) 31.5 - 36.5 g/dL    RDW 16.2 (H) 10.0 - 15.0 %    Platelet Count 395 150 - 450 10e3/uL    % Neutrophils 85 %    % Lymphocytes 7 %    % Monocytes 7 %    % Eosinophils 0 %    % Basophils 1 %    % Immature Granulocytes 1 %    NRBCs per 100 WBC 0 <1 /100    Absolute Neutrophils 14.2 (H) 1.6 - 8.3 10e3/uL    Absolute Lymphocytes 1.2 0.8 - 5.3 10e3/uL    Absolute Monocytes 1.2 0.0 - 1.3 10e3/uL    Absolute Eosinophils 0.1 0.0 - 0.7 10e3/uL    Absolute Basophils 0.1 0.0 - 0.2 10e3/uL    Absolute Immature Granulocytes 0.1 <=0.4 10e3/uL    Absolute NRBCs 0.0 10e3/uL   Troponin T, High Sensitivity    Collection Time: 11/29/24 12:08 PM   Result Value Ref Range    Troponin T, High Sensitivity 86 (H) <=22 ng/L   Glucose (OUTREACH)    Collection Time: 12/02/24  9:44 AM   Result Value Ref Range    Glucose 202 (H) 70 - 99 mg/dL   Basic Metabolic Panel No Glucose (OUTREACH)    Collection Time: 12/02/24  9:44 AM   Result Value Ref Range    Sodium 140 135 - 145 mmol/L    Potassium 4.2 3.4 - 5.3 mmol/L    Chloride 103 98 - 107 mmol/L    Carbon Dioxide (CO2) 21 (L) 22 - 29 mmol/L    Anion Gap 16 (H) 7 - 15 mmol/L    Urea Nitrogen 23.8 (H) 8.0 - 23.0 mg/dL    Creatinine 1.39 (H) 0.67 - 1.17 mg/dL    GFR Estimate 51 (L) >60 mL/min/1.73m2    Calcium 8.7 (L) 8.8 - 10.4 mg/dL   CBC with platelets and differential    Collection Time: 12/02/24  9:44 AM   Result Value Ref Range    WBC Count 13.2 (H) 4.0 - 11.0 10e3/uL    RBC Count 3.78 (L) 4.40 - 5.90 10e6/uL    Hemoglobin 10.7 (L) 13.3 - 17.7 g/dL    Hematocrit 35.5 (L) 40.0 - 53.0 %    MCV 94 78 - 100 fL    MCH 28.3 26.5 - 33.0 pg    MCHC 30.1 (L) 31.5 - 36.5 g/dL    RDW 16.0 (H) 10.0 - 15.0 %    Platelet Count 443 150 - 450 10e3/uL    % Neutrophils 81 %    % Lymphocytes 10 %    % Monocytes 7 %    % Eosinophils 1 %    % Basophils 1 %    % Immature Granulocytes 1 %    NRBCs per 100 WBC 0 <1 /100    Absolute  Neutrophils 10.6 (H) 1.6 - 8.3 10e3/uL    Absolute Lymphocytes 1.3 0.8 - 5.3 10e3/uL    Absolute Monocytes 0.9 0.0 - 1.3 10e3/uL    Absolute Eosinophils 0.2 0.0 - 0.7 10e3/uL    Absolute Basophils 0.1 0.0 - 0.2 10e3/uL    Absolute Immature Granulocytes 0.1 <=0.4 10e3/uL    Absolute NRBCs 0.0 10e3/uL        MEDICATIONS:  MED REC REQUIRED  Post Medication Reconciliation Status: discharge medications reconciled, continue medications without change          Review of your medicines            Accurate as of December 3, 2024 10:31 AM. If you have any questions, ask your nurse or doctor.                CONTINUE these medicines which may have CHANGED, or have new prescriptions. If we are uncertain of the size of tablets/capsules you have at home, strength may be listed as something that might have changed.        Dose / Directions   acetaminophen 500 MG tablet  Commonly known as: TYLENOL  This may have changed: Another medication with the same name was removed. Continue taking this medication, and follow the directions you see here.      Dose: 1,000 mg  Take 1,000 mg by mouth 3 times daily. TID while awake and PRN nightly  Refills: 0            CONTINUE these medicines which have NOT CHANGED        Dose / Directions   allopurinol 100 MG tablet  Commonly known as: ZYLOPRIM      Dose: 100 mg  Take 100 mg by mouth daily.  Refills: 0     aspirin 81 MG chewable tablet  Commonly known as: ASA  Used for: S/P CABG x 3      Dose: 162 mg  Take 2 tablets (162 mg) by mouth daily.  Refills: 0     atorvastatin 40 MG tablet  Commonly known as: LIPITOR  Used for: S/P CABG x 3      Dose: 40 mg  Take 1 tablet (40 mg) by mouth every evening.  Refills: 0     bumetanide 1 MG tablet  Commonly known as: BUMEX  Used for: S/P CABG x 3      Dose: 1 mg  Take 1 tablet (1 mg) by mouth daily.  Refills: 0     Ferrous Gluconate 324 (37.5 Fe) MG Tabs      Dose: 1 tablet  Take 1 tablet by mouth daily.  Refills: 0     finasteride 5 MG tablet  Commonly known  as: PROSCAR      Dose: 5 mg  Take 5 mg by mouth daily  Refills: 0     gabapentin 300 MG capsule  Commonly known as: NEURONTIN  Used for: S/P CABG x 3      Dose: 300 mg  Take 1 capsule (300 mg) by mouth 3 times daily as needed.  Refills: 0     glipiZIDE 2.5 MG 24 hr tablet  Commonly known as: GLUCOTROL XL  Used for: S/P CABG x 3      Dose: 2.5 mg  Take 1 tablet (2.5 mg) by mouth daily.  Refills: 0     insulin glargine 100 UNIT/ML pen  Commonly known as: LANTUS PEN  Used for: S/P CABG x 3      Dose: 10 Units  Inject 10 Units subcutaneously 2 times daily.  Refills: 0     LubriFresh P.M. Oint  Used for: S/P CABG x 3      Dose: 1 Application  Apply 1 g to eye 2 times daily as needed for dry eyes.  Refills: 0     metoprolol succinate  MG 24 hr tablet  Commonly known as: TOPROL XL  Used for: S/P CABG x 3      Dose: 100 mg  Take 1 tablet (100 mg) by mouth daily.  Refills: 0     pantoprazole 40 MG EC tablet  Commonly known as: PROTONIX  Used for: S/P CABG x 3      Dose: 40 mg  Take 1 tablet (40 mg) by mouth every morning (before breakfast).  Refills: 0     predniSONE 20 MG tablet  Commonly known as: DELTASONE      Dose: 40 mg  Take 40 mg by mouth daily.  Refills: 0     sodium bicarbonate 650 MG tablet  Used for: S/P CABG x 3      Dose: 1,950 mg  Take 3 tablets (1,950 mg) by mouth 3 times daily.  Refills: 0     spironolactone 25 MG tablet  Commonly known as: ALDACTONE  Used for: S/P CABG x 3      Dose: 25 mg  Take 1 tablet (25 mg) by mouth daily.  Refills: 0     traZODone 50 MG tablet  Commonly known as: DESYREL      Dose: 25 mg  Take 25 mg by mouth at bedtime.  Refills: 0     Tums 500 MG chewable tablet  Generic drug: calcium carbonate      Dose: 1,000 mg  Take 1,000 mg by mouth 3 times daily as needed for heartburn  Refills: 0     ursodiol 300 MG capsule  Commonly known as: ACTIGALL  Used for: Biliary stricture (H)      Dose: 300 mg  Take 1 capsule (300 mg) by mouth 2 times daily  Quantity: 60 capsule  Refills: 0             STOP taking      melatonin 5 MG tablet                 ASSESSMENT/PLAN  Encounter Diagnoses   Name Primary?    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

## 2024-12-03 NOTE — PROGRESS NOTES
Formerly [facility] GERIATRIC SERVICES  Chief Complaint   Patient presents with    [facility] [place] Medical Record Number:  [id number]  Place of Service where encounter took place:  [facility] ([facility]) [[id number]]  Code Status:  Full Code     HISTORY:      HPI:  Lance Ibrahim  is 79 year old (1945) undergoing physical and occupational therapy.    He is with past medical history significant for HFpEF, diabetes, gout,  Excerpted from records  who presented with a STEMI on 11/14/24.  Subsequent coronary angiography revealed very tight left main disease and a totally occluded RCA. Over the weekend he had chest pain at rest and an intra-aortic balloon pump was placed. He was referred to CV surgery for evaluation for possible coronary artery revascularization.      Patient was deemed a candidate for coronary artery bypass surgery, and was taken to the operating room on 11/18/24 where patient underwent 3 vessel coronary artery bypass and endoscopic vein harvest from the left leg. Surgery was uneventful and patient was brought to the ICU post-operatively.IABP was removed POD#1. He was extubated on POD#2 and weaned from pressors. Patient was awake and alert, afebrile, and with stable vitals. Insulin drip was discontinued and he was transitioned to a sliding scale. He was transferred to general telemetry status on POD#4 where patient has had return of bowel function, is maintaining oxygen saturations on room air, had his chest tubes removed, and has no complaints of chest pain or shortness of breath. On 11/26/24, patient was stable enough to be discharged to TCU.     Of note, postop echo showed EF 25%, therefore a Lifevest was ordered and place on pt.before discharge to TCU. Lifevest ordered for 3 mos or until heart failure discontinues it. Heart failure was consulted and appropriate medications were prescribed with follow up appointments.     Today he is seen to review VS, routine visit and to reestablish care.   "He denied chest painhe however did report having chest pressure.  He is status post CABG x 3.  He tells writer that it feels like an elephant sitting on his cath in place based with, \"just a small elephant \".  He denied pain radiating to the neck or arm.  Temperature 99.3 with a pulse of 102 respiratory rate 22.  Currently wearing a LifeVest.  Per his report he has not walked in months due to bone-on-bone left knee.  He does rate his pain 4 out of 10 and gets relief with pain medication.  He was sent to the ER for further evaluation.      ALLERGIES:Ancef [cefazolin], Cephalexin, Penicillins, and Sulfa antibiotics    PAST MEDICAL HISTORY:   Past Medical History:   Diagnosis Date    Anemia     Arthritis     osteoarthritis knees    BPH     CAD (coronary artery disease)     subtotal occlusion in the small distal LAD     Cardiomyopathy     Cerebral artery occlusion with cerebral infarction     TIA 1993, no residual    Cervicalgia     CHF (congestive heart failure) (H)     Chronic kidney disease     Chronic low back pain     Chronic rhinitis     Gouty arthropathy     hands    Hyperlipidemia     Hypertension     Kidney stone     Nocturia     Obesity     Osteomyelitis (H) 08/2023    Foot    PVD (peripheral vascular disease)     Sleep apnea     doesn't use cpap    Spinal cord stimulator status 04/2024    Spinal cord stimulator in place    TIA (transient ischaemic attack) 1993    Type 2 diabetes mellitus - 11/08/2018    Ureteral stone        PAST SURGICAL HISTORY:   has a past surgical history that includes Diastasis recti repair (1985); Ventral hernia repair NOS (1987); ORIF Shoulder (Left); Colonoscopy (03/09/2013); hernia repair (07/13/2004); Foot surgery (04/2013); Amputate toe(s) (Left, 10/15/2018); Arthroplasty knee (Right, 12/07/2018); Heart Catheterization with Possible Intervention (Left, 03/05/2019); Extracapsular cataract extration with intraocular lens implant (Left, 03/13/2017); Extracapsular cataract extration " with intraocular lens implant (Right); Amputate foot (Right, 08/07/2023); Esophagoscopy, gastroscopy, duodenoscopy (EGD), combined (N/A, 04/30/2024); Endoscopic retrograde cholangiopancreatogram (N/A, 04/30/2024); Spinal Cord Stimulator Implant (04/03/2024); Prostate surgery (02/2024); IR Disc Aspriation Injection (6/19/2024); Irrigation and debridement shoulder, combined (Right, 8/7/2024); Resect clavicle distal (Right, 8/7/2024); Irrigation and debridement upper extremity, combined (Right, 8/7/2024); Coronary Angiogram (N/A, 11/14/2024); Intravascular Ultrasound (N/A, 11/14/2024); Left Heart Catheterization (N/A, 11/14/2024); Intra aortic Balloon Pump Insertion (N/A, 11/16/2024); Coronary Artery Bypass Graft, With Endoscopic Vessel Procurement (N/A, 11/18/2024); and Transesophageal echocardiogram intraoperative (N/A, 11/18/2024).    FAMILY HISTORY: family history includes Alcohol/Drug in his paternal grandfather; Cancer in his father; Diabetes in his maternal grandfather; Family History Negative in his mother.    SOCIAL HISTORY:  reports that he quit smoking about 31 years ago. His smoking use included cigarettes. He has never been exposed to tobacco smoke. He has never used smokeless tobacco. He reports that he does not currently use alcohol. He reports that he does not use drugs.    ROS:  Constitutional: Negative for activity change, appetite change, fatigue and fever. Hasn't ambulated in months due to bone on bone left knee   HENT: Negative for congestion.    Respiratory: Negative for cough, shortness of breath and wheezing.    Cardiovascular: Negative for chest pain and leg swelling. Chest pressure   Gastrointestinal: Negative for abdominal distention, abdominal pain, constipation, diarrhea and nausea.   Genitourinary: Negative for dysuria.   Musculoskeletal:   Skin: Negative for color change and wound.  Midline surgical incision, surgical wounds left medial leg   Neurological: Negative for dizziness.    Psychiatric/Behavioral: Negative for agitation, behavioral problems and confusion.     Physical Exam:  Constitutional:       Appearance: Patient is well-developed.   HENT:      Head: Normocephalic.   Eyes:      Conjunctiva/sclera: Conjunctivae normal.   Neck:      Musculoskeletal: Normal range of motion.   Cardiovascular:      Rate and Rhythm: Normal rate and regular rhythm.      Heart sounds: Normal heart sounds. No murmur. Wearing a life vest   Pulmonary:      Effort: No respiratory distress.      Breath sounds: Normal breath sounds.   no wheezing or rales.   Abdominal:      General: Bowel sounds are normal. There is no distension.      Palpations: Abdomen is soft.      Tenderness: There is no abdominal tenderness.   Musculoskeletal:       Normal range of motion.  not ambulating per his report   Skin:General:        Skin is warm. Midline chest incision C/D/I TANVI, medial left leg incision   Neurological:         Mental Status: Patient is alert and oriented to person, place, and time.   Psychiatric:         Behavior: Behavior normal.     Vitals:/82   Pulse 102   Temp 99.3  F (37.4  C)   Resp 22   Ht 1.829 m (6')   Wt 87.9 kg (193 lb 12.8 oz)   SpO2 95%   BMI 26.28 kg/m   and Body mass index is 26.28 kg/m .    Lab/Diagnostic data:   Recent Results (from the past 240 hours)   Glucose by meter    Collection Time: 11/22/24  8:48 PM   Result Value Ref Range    GLUCOSE BY METER POCT 158 (H) 70 - 99 mg/dL   Ionized Calcium    Collection Time: 11/23/24  5:39 AM   Result Value Ref Range    Calcium Ionized Whole Blood 4.4 4.4 - 5.2 mg/dL   Phosphorus    Collection Time: 11/23/24  5:39 AM   Result Value Ref Range    Phosphorus 2.3 (L) 2.5 - 4.5 mg/dL   Magnesium    Collection Time: 11/23/24  5:39 AM   Result Value Ref Range    Magnesium 2.1 1.7 - 2.3 mg/dL   Potassium    Collection Time: 11/23/24  5:39 AM   Result Value Ref Range    Potassium 3.7 3.4 - 5.3 mmol/L   Extra Purple Top EDTA (LAB USE ONLY)     Collection Time: 11/23/24  5:39 AM   Result Value Ref Range    Hold Specimen [facility]    CBC with platelets    Collection Time: 11/23/24  5:39 AM   Result Value Ref Range    WBC Count 10.7 4.0 - 11.0 10e3/uL    RBC Count 3.83 (L) 4.40 - 5.90 10e6/uL    Hemoglobin 10.9 (L) 13.3 - 17.7 g/dL    Hematocrit 35.4 (L) 40.0 - 53.0 %    MCV 92 78 - 100 fL    MCH 28.5 26.5 - 33.0 pg    MCHC 30.8 (L) 31.5 - 36.5 g/dL    RDW 16.2 (H) 10.0 - 15.0 %    Platelet Count 306 150 - 450 10e3/uL   Basic metabolic panel    Collection Time: 11/23/24  5:39 AM   Result Value Ref Range    Sodium 145 135 - 145 mmol/L    Potassium 3.7 3.4 - 5.3 mmol/L    Chloride 108 (H) 98 - 107 mmol/L    Carbon Dioxide (CO2) 25 22 - 29 mmol/L    Anion Gap 12 7 - 15 mmol/L    Urea Nitrogen 35.9 (H) 8.0 - 23.0 mg/dL    Creatinine 1.55 (H) 0.67 - 1.17 mg/dL    GFR Estimate 45 (L) >60 mL/min/1.73m2    Calcium 8.2 (L) 8.8 - 10.4 mg/dL    Glucose 103 (H) 70 - 99 mg/dL   Glucose by meter    Collection Time: 11/23/24  7:39 AM   Result Value Ref Range    GLUCOSE BY METER POCT 113 (H) 70 - 99 mg/dL   Glucose by meter    Collection Time: 11/23/24 12:04 PM   Result Value Ref Range    GLUCOSE BY METER POCT 175 (H) 70 - 99 mg/dL   Ionized Calcium    Collection Time: 11/23/24  4:04 PM   Result Value Ref Range    Calcium Ionized Whole Blood 4.4 4.4 - 5.2 mg/dL   Glucose by meter    Collection Time: 11/23/24  5:15 PM   Result Value Ref Range    GLUCOSE BY METER POCT 135 (H) 70 - 99 mg/dL   Glucose by meter    Collection Time: 11/23/24  8:52 PM   Result Value Ref Range    GLUCOSE BY METER POCT 186 (H) 70 - 99 mg/dL   Ionized Calcium    Collection Time: 11/23/24 10:37 PM   Result Value Ref Range    Calcium Ionized Whole Blood 4.5 4.4 - 5.2 mg/dL   Magnesium    Collection Time: 11/24/24  5:51 AM   Result Value Ref Range    Magnesium 2.1 1.7 - 2.3 mg/dL   Potassium    Collection Time: 11/24/24  5:51 AM   Result Value Ref Range    Potassium 3.8 3.4 - 5.3 mmol/L   Extra Purple Top EDTA  (LAB USE ONLY)    Collection Time: 11/24/24  5:51 AM   Result Value Ref Range    Hold Specimen [facility]    Glucose by meter    Collection Time: 11/24/24  7:26 AM   Result Value Ref Range    GLUCOSE BY METER POCT 153 (H) 70 - 99 mg/dL   Echocardiogram Limited    Collection Time: 11/24/24 11:45 AM   Result Value Ref Range    LVEF  20-25% (severely reduced)    Glucose by meter    Collection Time: 11/24/24 12:26 PM   Result Value Ref Range    GLUCOSE BY METER POCT 144 (H) 70 - 99 mg/dL   Glucose by meter    Collection Time: 11/24/24  4:37 PM   Result Value Ref Range    GLUCOSE BY METER POCT 194 (H) 70 - 99 mg/dL   Glucose by meter    Collection Time: 11/24/24  9:04 PM   Result Value Ref Range    GLUCOSE BY METER POCT 165 (H) 70 - 99 mg/dL   Magnesium    Collection Time: 11/25/24  4:33 AM   Result Value Ref Range    Magnesium 2.3 1.7 - 2.3 mg/dL   Potassium    Collection Time: 11/25/24  4:33 AM   Result Value Ref Range    Potassium 3.9 3.4 - 5.3 mmol/L   Extra Purple Top EDTA (LAB USE ONLY)    Collection Time: 11/25/24  4:33 AM   Result Value Ref Range    Hold Specimen [facility]    Glucose by meter    Collection Time: 11/25/24  7:57 AM   Result Value Ref Range    GLUCOSE BY METER POCT 163 (H) 70 - 99 mg/dL   Glucose by meter    Collection Time: 11/25/24 11:51 AM   Result Value Ref Range    GLUCOSE BY METER POCT 208 (H) 70 - 99 mg/dL   Glucose by meter    Collection Time: 11/25/24  5:14 PM   Result Value Ref Range    GLUCOSE BY METER POCT 140 (H) 70 - 99 mg/dL   Glucose by meter    Collection Time: 11/25/24  9:07 PM   Result Value Ref Range    GLUCOSE BY METER POCT 167 (H) 70 - 99 mg/dL   Glucose by meter    Collection Time: 11/26/24  2:21 AM   Result Value Ref Range    GLUCOSE BY METER POCT 136 (H) 70 - 99 mg/dL   Potassium    Collection Time: 11/26/24  4:22 AM   Result Value Ref Range    Potassium 3.6 3.4 - 5.3 mmol/L   Magnesium    Collection Time: 11/26/24  4:22 AM   Result Value Ref Range    Magnesium 2.2 1.7 - 2.3 mg/dL    Basic metabolic panel    Collection Time: 11/26/24  4:22 AM   Result Value Ref Range    Sodium 141 135 - 145 mmol/L    Potassium 3.6 3.4 - 5.3 mmol/L    Chloride 105 98 - 107 mmol/L    Carbon Dioxide (CO2) 26 22 - 29 mmol/L    Anion Gap 10 7 - 15 mmol/L    Urea Nitrogen 24.3 (H) 8.0 - 23.0 mg/dL    Creatinine 1.35 (H) 0.67 - 1.17 mg/dL    GFR Estimate 53 (L) >60 mL/min/1.73m2    Calcium 8.3 (L) 8.8 - 10.4 mg/dL    Glucose 128 (H) 70 - 99 mg/dL   Glucose by meter    Collection Time: 11/26/24  7:37 AM   Result Value Ref Range    GLUCOSE BY METER POCT 160 (H) 70 - 99 mg/dL   Glucose by meter    Collection Time: 11/26/24 11:15 AM   Result Value Ref Range    GLUCOSE BY METER POCT 169 (H) 70 - 99 mg/dL   Glucose by meter    Collection Time: 11/26/24  4:06 PM   Result Value Ref Range    GLUCOSE BY METER POCT 82 70 - 99 mg/dL   ECG 12-LEAD WITH MUSE (LHE)    Collection Time: 11/29/24  9:21 AM   Result Value Ref Range    Systolic Blood Pressure 150 mmHg    Diastolic Blood Pressure 88 mmHg    Ventricular Rate 92 BPM    Atrial Rate 92 BPM    VT Interval 190 ms    QRS Duration 112 ms     ms    QTc 457 ms    P Axis 87 degrees    R AXIS -32 degrees    T Axis 102 degrees    Interpretation ECG       Sinus rhythm with Premature supraventricular complexes  Left axis deviation  Pulmonary disease pattern  T wave abnormality, consider lateral ischemia  Abnormal ECG  When compared with ECG of 29-Nov-2024 09:12,  Premature supraventricular complexes are now Present  Confirmed by SEE ED PROVIDER NOTE FOR, ECG INTERPRETATION ([id number]),  BESSIE CONDON ([id number]) on 12/1/2024 12:02:36 AM     Basic metabolic panel    Collection Time: 11/29/24  9:30 AM   Result Value Ref Range    Sodium 142 135 - 145 mmol/L    Potassium 4.3 3.4 - 5.3 mmol/L    Chloride 106 98 - 107 mmol/L    Carbon Dioxide (CO2) 24 22 - 29 mmol/L    Anion Gap 12 7 - 15 mmol/L    Urea Nitrogen 18.4 8.0 - 23.0 mg/dL    Creatinine 1.53 (H) 0.67 - 1.17 mg/dL    GFR  Estimate 46 (L) >60 mL/min/1.73m2    Calcium 8.4 (L) 8.8 - 10.4 mg/dL    Glucose 135 (H) 70 - 99 mg/dL   Troponin T, High Sensitivity    Collection Time: 11/29/24  9:30 AM   Result Value Ref Range    Troponin T, High Sensitivity 91 (H) <=22 ng/L   INR    Collection Time: 11/29/24  9:30 AM   Result Value Ref Range    INR 1.23 (H) 0.85 - 1.15   Partial thromboplastin time    Collection Time: 11/29/24  9:30 AM   Result Value Ref Range    aPTT 34 22 - 38 Seconds   CBC with platelets and differential    Collection Time: 11/29/24  9:30 AM   Result Value Ref Range    WBC Count 16.8 (H) 4.0 - 11.0 10e3/uL    RBC Count 3.86 (L) 4.40 - 5.90 10e6/uL    Hemoglobin 10.9 (L) 13.3 - 17.7 g/dL    Hematocrit 35.4 (L) 40.0 - 53.0 %    MCV 92 78 - 100 fL    MCH 28.2 26.5 - 33.0 pg    MCHC 30.8 (L) 31.5 - 36.5 g/dL    RDW 16.2 (H) 10.0 - 15.0 %    Platelet Count 395 150 - 450 10e3/uL    % Neutrophils 85 %    % Lymphocytes 7 %    % Monocytes 7 %    % Eosinophils 0 %    % Basophils 1 %    % Immature Granulocytes 1 %    NRBCs per 100 WBC 0 <1 /100    Absolute Neutrophils 14.2 (H) 1.6 - 8.3 10e3/uL    Absolute Lymphocytes 1.2 0.8 - 5.3 10e3/uL    Absolute Monocytes 1.2 0.0 - 1.3 10e3/uL    Absolute Eosinophils 0.1 0.0 - 0.7 10e3/uL    Absolute Basophils 0.1 0.0 - 0.2 10e3/uL    Absolute Immature Granulocytes 0.1 <=0.4 10e3/uL    Absolute NRBCs 0.0 10e3/uL   Troponin T, High Sensitivity    Collection Time: 11/29/24 12:08 PM   Result Value Ref Range    Troponin T, High Sensitivity 86 (H) <=22 ng/L   Glucose (OUTREACH)    Collection Time: 12/02/24  9:44 AM   Result Value Ref Range    Glucose 202 (H) 70 - 99 mg/dL   Basic Metabolic Panel No Glucose (OUTREACH)    Collection Time: 12/02/24  9:44 AM   Result Value Ref Range    Sodium 140 135 - 145 mmol/L    Potassium 4.2 3.4 - 5.3 mmol/L    Chloride 103 98 - 107 mmol/L    Carbon Dioxide (CO2) 21 (L) 22 - 29 mmol/L    Anion Gap 16 (H) 7 - 15 mmol/L    Urea Nitrogen 23.8 (H) 8.0 - 23.0 mg/dL     Creatinine 1.39 (H) 0.67 - 1.17 mg/dL    GFR Estimate 51 (L) >60 mL/min/1.73m2    Calcium 8.7 (L) 8.8 - 10.4 mg/dL   CBC with platelets and differential    Collection Time: 12/02/24  9:44 AM   Result Value Ref Range    WBC Count 13.2 (H) 4.0 - 11.0 10e3/uL    RBC Count 3.78 (L) 4.40 - 5.90 10e6/uL    Hemoglobin 10.7 (L) 13.3 - 17.7 g/dL    Hematocrit 35.5 (L) 40.0 - 53.0 %    MCV 94 78 - 100 fL    MCH 28.3 26.5 - 33.0 pg    MCHC 30.1 (L) 31.5 - 36.5 g/dL    RDW 16.0 (H) 10.0 - 15.0 %    Platelet Count 443 150 - 450 10e3/uL    % Neutrophils 81 %    % Lymphocytes 10 %    % Monocytes 7 %    % Eosinophils 1 %    % Basophils 1 %    % Immature Granulocytes 1 %    NRBCs per 100 WBC 0 <1 /100    Absolute Neutrophils 10.6 (H) 1.6 - 8.3 10e3/uL    Absolute Lymphocytes 1.3 0.8 - 5.3 10e3/uL    Absolute Monocytes 0.9 0.0 - 1.3 10e3/uL    Absolute Eosinophils 0.2 0.0 - 0.7 10e3/uL    Absolute Basophils 0.1 0.0 - 0.2 10e3/uL    Absolute Immature Granulocytes 0.1 <=0.4 10e3/uL    Absolute NRBCs 0.0 10e3/uL        MEDICATIONS:  MED REC REQUIRED  Post Medication Reconciliation Status: discharge medications reconciled, continue medications without change          Review of your medicines            Accurate as of November 29, 2024 11:59 PM. If you have any questions, ask your nurse or doctor.                CONTINUE these medicines which may have CHANGED, or have new prescriptions. If we are uncertain of the size of tablets/capsules you have at home, strength may be listed as something that might have changed.        Dose / Directions   Ferrous Gluconate 324 (37.5 Fe) MG Tabs  This may have changed: Another medication with the same name was removed. Continue taking this medication, and follow the directions you see here.  Changed by: Darling Valdivia      Dose: 1 tablet  Take 1 tablet by mouth daily.  Refills: 0            CONTINUE these medicines which have NOT CHANGED        Dose / Directions   acetaminophen 325 MG tablet  Commonly  known as: TYLENOL  Used for: S/P CABG x 3      Dose: 650 mg  Take 2 tablets (650 mg) by mouth every 4 hours as needed for other (For optimal non-opioid multimodal pain management to improve pain control.).  Refills: 0     allopurinol 100 MG tablet  Commonly known as: ZYLOPRIM      Dose: 100 mg  Take 100 mg by mouth daily.  Refills: 0     aspirin 81 MG chewable tablet  Commonly known as: ASA  Used for: S/P CABG x 3      Dose: 162 mg  Take 2 tablets (162 mg) by mouth daily.  Refills: 0     atorvastatin 40 MG tablet  Commonly known as: LIPITOR  Used for: S/P CABG x 3      Dose: 40 mg  Take 1 tablet (40 mg) by mouth every evening.  Refills: 0     bumetanide 1 MG tablet  Commonly known as: BUMEX  Used for: S/P CABG x 3      Dose: 1 mg  Take 1 tablet (1 mg) by mouth daily.  Refills: 0     finasteride 5 MG tablet  Commonly known as: PROSCAR      Dose: 5 mg  Take 5 mg by mouth daily  Refills: 0     gabapentin 300 MG capsule  Commonly known as: NEURONTIN  Used for: S/P CABG x 3      Dose: 300 mg  Take 1 capsule (300 mg) by mouth 3 times daily as needed.  Refills: 0     glipiZIDE 2.5 MG 24 hr tablet  Commonly known as: GLUCOTROL XL  Used for: S/P CABG x 3      Dose: 2.5 mg  Take 1 tablet (2.5 mg) by mouth daily.  Refills: 0     insulin glargine 100 UNIT/ML pen  Commonly known as: LANTUS PEN  Used for: S/P CABG x 3      Dose: 10 Units  Inject 10 Units subcutaneously 2 times daily.  Refills: 0     LubriFresh P.M. Oint  Used for: S/P CABG x 3      Dose: 1 Application  Apply 1 g to eye 2 times daily as needed for dry eyes.  Refills: 0     melatonin 5 MG tablet  Used for: S/P CABG x 3      Dose: 5 mg  Take 1 tablet (5 mg) by mouth nightly as needed for sleep.  Refills: 0     metoprolol succinate  MG 24 hr tablet  Commonly known as: TOPROL XL  Used for: S/P CABG x 3      Dose: 100 mg  Take 1 tablet (100 mg) by mouth daily.  Refills: 0     pantoprazole 40 MG EC tablet  Commonly known as: PROTONIX  Used for: S/P CABG x 3       Dose: 40 mg  Take 1 tablet (40 mg) by mouth every morning (before breakfast).  Refills: 0     sodium bicarbonate 650 MG tablet  Used for: S/P CABG x 3      Dose: 1,950 mg  Take 3 tablets (1,950 mg) by mouth 3 times daily.  Refills: 0     spironolactone 25 MG tablet  Commonly known as: ALDACTONE  Used for: S/P CABG x 3      Dose: 25 mg  Take 1 tablet (25 mg) by mouth daily.  Refills: 0     Tums 500 MG chewable tablet  Generic drug: calcium carbonate      Dose: 1,000 mg  Take 1,000 mg by mouth 3 times daily as needed for heartburn  Refills: 0     ursodiol 300 MG capsule  Commonly known as: ACTIGALL  Used for: Biliary stricture (H)      Dose: 300 mg  Take 1 capsule (300 mg) by mouth 2 times daily  Quantity: 60 capsule  Refills: 0            STOP taking      Dexcom G7  Leeann  Stopped by: Darling Valdivia        Dexcom G7 Sensor Misc  Stopped by: Darling Valdivia                 ASSESSMENT/PLAN  Encounter Diagnoses   Name Primary?    Physical deconditioning Yes    Pain management     Chronic systolic congestive heart failure (H)     NSTEMI (non-ST elevated myocardial infarction) (H)         Chest pressure - Sent to the ER for further evaluation     CABG x3 Follow up with cardiology, Pain management Life vest, ASA    Pain management as needed Tylenol, ,  spinal cord stimulator, Gabapentin     HDL on Atorvastatin    Physical deconditioning  PT/OT    Gout allopurinol    CHF- daily weights, Bumex Spironolactone     Diabetes type 2 continue glipizide XL 2.5 mg daily, glargine 10 units subcu twice daily,     Hypertension Metoprolol Succinate     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

## 2024-12-05 ENCOUNTER — LAB REQUISITION (OUTPATIENT)
Dept: LAB | Facility: CLINIC | Age: 79
End: 2024-12-05
Payer: COMMERCIAL

## 2024-12-05 DIAGNOSIS — I25.10 ATHEROSCLEROTIC HEART DISEASE OF NATIVE CORONARY ARTERY WITHOUT ANGINA PECTORIS: ICD-10-CM

## 2024-12-05 PROCEDURE — 999N000157 HC STATISTIC RCP TIME EA 10 MIN

## 2024-12-06 LAB
ERYTHROCYTE [DISTWIDTH] IN BLOOD BY AUTOMATED COUNT: 15.4 % (ref 10–15)
HCT VFR BLD AUTO: 34.3 % (ref 40–53)
HGB BLD-MCNC: 10.3 G/DL (ref 13.3–17.7)
MCH RBC QN AUTO: 27.8 PG (ref 26.5–33)
MCHC RBC AUTO-ENTMCNC: 30 G/DL (ref 31.5–36.5)
MCV RBC AUTO: 93 FL (ref 78–100)
PLATELET # BLD AUTO: 448 10E3/UL (ref 150–450)
RBC # BLD AUTO: 3.7 10E6/UL (ref 4.4–5.9)
WBC # BLD AUTO: 10.7 10E3/UL (ref 4–11)

## 2024-12-06 PROCEDURE — 85027 COMPLETE CBC AUTOMATED: CPT | Mod: ORL | Performed by: FAMILY MEDICINE

## 2024-12-06 PROCEDURE — 36415 COLL VENOUS BLD VENIPUNCTURE: CPT | Mod: ORL | Performed by: FAMILY MEDICINE

## 2024-12-06 PROCEDURE — P9604 ONE-WAY ALLOW PRORATED TRIP: HCPCS | Mod: ORL | Performed by: FAMILY MEDICINE

## 2024-12-09 NOTE — PROGRESS NOTES
[facility] GERIATRIC SERVICES       Patient Lance Ibrahim  MRN: [id number]        Reason for Visit     Chief Complaint   Patient presents with    RECHECK     INITIAL       Code Status     CPR/Full code     Assessment/plan     NSTEMI S/P CABG X3 ON 11/14/24  Patient underwent above-mentioned procedure as well as endoscopic vein harvest from the left leg.  Tolerated the procedure well and discharged to the TCU  Continue with his incisional cares  Was in the emergency room recently for recurrence of chest pain  Now reporting improvement and feels he is ready to go home   has an upcoming appointment with cardiology    Recent hospitalization due to discitis/osteomyelitis L4-L5 with severe foraminal narrowing status post IR guided L4-L5 disc aspiration and L4 biopsy done on 6/19/2024.  Presence of discitis osteomyelitis at L4-L5 with epidural abscess.  Severe spinal stenosis  Wa in the TCU for a lengthy stay because of need for IV antibiotics  Back pain is controlled    History of chronic back pain with spinal stenosis  Currently only on Tylenol  Also takes gabapentin as needed  Pain is controlled    Chronic gouty arthropathy requiring steroids and colchicine now improved    Type 2 diabetes uncontrolled with an A1c of 8  On oral meds including glipizide and semaglutide.  Now on Lantus and dosage has been increased with improvement noted in his blood sugars  Continue with blood sugar checks    Diabetic neuropathy on gabapentin 3 times a day prn.  No pain concerns    Congestive heart failure with preserved ejection fraction, mild aortic stenosis.  Continue with medical management with metoprolol.  Also on diuretics including Bumex and spironolactone.  No evidence of volume overload    Hypertension monitor blood pressures he is on metoprolol as well as diuretics including Bumex and spironolactone.  Blood pressures appear to be stable.    CKD 3 A monitor kidney functions discharge creatinine is on recheck 1.3 which appears to be  baseline number for him.    Chronic anemia on iron supplementation.HG 11  Leukocytosis had improved to    PAD he is on aspirin                  Obstructive sleep apnea does not use CPAP  Has a issue with uvala which is chronic and is a side sleeper  Gets hypoxic at HS and needs O2\    Chronic insomnia he takes trazodone                  Generalized weakness he resides in assisted living facility now discharged to the TCU for strengthening and rehab.    Reports he is doing better.  And planning to discharge home soon.  He has an appointment with cardiology  Recheck labs done recently reviewed hemoglobin is now improved to 10.3 with improvement in his leukocytosis  Kidney functions reviewed and his creatinine is 1.3 which appears to be at baseline number for him    History     Patient is a very pleasant 79 year old male who is admitted to TCU  Patient presented with NSTEMI.  He was electively taken to the hospital and underwent CABG x 3.  Postprocedure did well discharged to the TCU.  Unfortunately had another episode of chest pain was sent to the emergency room.  Now reports doing well and ready to discharge home soon no incisional pain reported    Past Medical & Surgical History     PAST MEDICAL HISTORY:   Past Medical History:   Diagnosis Date    Anemia     Arthritis     osteoarthritis knees    BPH     CAD (coronary artery disease)     subtotal occlusion in the small distal LAD     Cardiomyopathy     Cerebral artery occlusion with cerebral infarction     TIA 1993, no residual    Cervicalgia     CHF (congestive heart failure) (H)     Chronic kidney disease     Chronic low back pain     Chronic rhinitis     Gouty arthropathy     hands    Hyperlipidemia     Hypertension     Kidney stone     Nocturia     Obesity     Osteomyelitis (H) 08/2023    Foot    PVD (peripheral vascular disease)     Sleep apnea     doesn't use cpap    Spinal cord stimulator status 04/2024    Spinal cord stimulator in place    TIA (transient  ischaemic attack) 1993    Type 2 diabetes mellitus - 11/08/2018    Ureteral stone       PAST SURGICAL HISTORY:   has a past surgical history that includes Diastasis recti repair (1985); Ventral hernia repair NOS (1987); ORIF Shoulder (Left); Colonoscopy (03/09/2013); hernia repair (07/13/2004); Foot surgery (04/2013); Amputate toe(s) (Left, 10/15/2018); Arthroplasty knee (Right, 12/07/2018); Heart Catheterization with Possible Intervention (Left, 03/05/2019); Extracapsular cataract extration with intraocular lens implant (Left, 03/13/2017); Extracapsular cataract extration with intraocular lens implant (Right); Amputate foot (Right, 08/07/2023); Esophagoscopy, gastroscopy, duodenoscopy (EGD), combined (N/A, 04/30/2024); Endoscopic retrograde cholangiopancreatogram (N/A, 04/30/2024); Spinal Cord Stimulator Implant (04/03/2024); Prostate surgery (02/2024); IR Disc Aspriation Injection (6/19/2024); Irrigation and debridement shoulder, combined (Right, 8/7/2024); Resect clavicle distal (Right, 8/7/2024); Irrigation and debridement upper extremity, combined (Right, 8/7/2024); Coronary Angiogram (N/A, 11/14/2024); Intravascular Ultrasound (N/A, 11/14/2024); Left Heart Catheterization (N/A, 11/14/2024); Intra aortic Balloon Pump Insertion (N/A, 11/16/2024); Coronary Artery Bypass Graft, With Endoscopic Vessel Procurement (N/A, 11/18/2024); and Transesophageal echocardiogram intraoperative (N/A, 11/18/2024).      Past Social History     Reviewed,  reports that he quit smoking about 31 years ago. His smoking use included cigarettes. He has never been exposed to tobacco smoke. He has never used smokeless tobacco. He reports that he does not currently use alcohol. He reports that he does not use drugs.    Family History     Reviewed, and family history includes Alcohol/Drug in his paternal grandfather; Cancer in his father; Diabetes in his maternal grandfather; Family History Negative in his mother.    Medication List      Current Outpatient Medications   Medication Sig Dispense Refill    acetaminophen (TYLENOL) 500 MG tablet Take 1,000 mg by mouth 3 times daily. TID while awake and PRN nightly      allopurinol (ZYLOPRIM) 100 MG tablet Take 100 mg by mouth daily.      aspirin (ASA) 81 MG chewable tablet Take 2 tablets (162 mg) by mouth daily.      atorvastatin (LIPITOR) 40 MG tablet Take 1 tablet (40 mg) by mouth every evening.      bumetanide (BUMEX) 1 MG tablet Take 1 tablet (1 mg) by mouth daily.      calcium carbonate (TUMS) 500 MG chewable tablet Take 1,000 mg by mouth 3 times daily as needed for heartburn      Ferrous Gluconate 324 (37.5 Fe) MG TABS Take 1 tablet by mouth daily.      finasteride (PROSCAR) 5 MG tablet Take 5 mg by mouth daily      gabapentin (NEURONTIN) 300 MG capsule Take 1 capsule (300 mg) by mouth 3 times daily as needed.      glipiZIDE (GLUCOTROL XL) 2.5 MG 24 hr tablet Take 1 tablet (2.5 mg) by mouth daily.      insulin glargine (LANTUS PEN) 100 UNIT/ML pen Inject 10 Units subcutaneously 2 times daily.      metoprolol succinate ER (TOPROL XL) 100 MG 24 hr tablet Take 1 tablet (100 mg) by mouth daily.      pantoprazole (PROTONIX) 40 MG EC tablet Take 1 tablet (40 mg) by mouth every morning (before breakfast).      sodium bicarbonate 650 MG tablet Take 3 tablets (1,950 mg) by mouth 3 times daily.      spironolactone (ALDACTONE) 25 MG tablet Take 1 tablet (25 mg) by mouth daily.      traZODone (DESYREL) 50 MG tablet Take 25 mg by mouth at bedtime.      ursodiol (ACTIGALL) 300 MG capsule Take 1 capsule (300 mg) by mouth 2 times daily 60 capsule 0    White Petrolatum-Mineral Oil (LUBRIFRESH P.M.) OINT Apply 1 g to eye 2 times daily as needed for dry eyes.       No current facility-administered medications for this visit.      MED REC REQUIRED  Post Medication Reconciliation Status:          Allergies     Allergies   Allergen Reactions    Ancef [Cefazolin] Rash    Cephalexin Itching and Rash     Allergic  "reaction required hospitalization 4/2024    Penicillins Anaphylaxis    Other Drug Allergy (See Comments) Unknown    Sulfa Antibiotics      \"itchy rash, swelling of face and hands\"       Review of Systems   A comprehensive review of 14 systems was done. Pertinent findings noted here and in history of present illness. All the rest negative.  Constitutional: Negative.  Negative for fever, chills, he has  activity change, appetite change and fatigue.   HENT: Negative for congestion and facial swelling.    Eyes: Negative for photophobia, redness and visual disturbance.   Respiratory: Negative for cough and chest tightness.    Cardiovascular: Negative for chest pain, palpitations and leg swelling.   Midline chest wall incision is healing  Gastrointestinal: Negative for nausea, diarrhea, constipation, blood in stool and abdominal distention.   Genitourinary: Negative.    Musculoskeletal: Negative.does not walk much at baseline    Back pain is improved.  Skin: Negative.    Neurological: Negative for dizziness, tremors, syncope, weakness, light-headedness and headaches.   Hematological: Does not bruise/bleed easily.   Psychiatric/Behavioral: Negative.  Feels ready to go home      Physical Exam   /79   Pulse 69   Temp 97.7  F (36.5  C)   Resp 19   Ht 1.829 m (6')   Wt 80.7 kg (178 lb)   SpO2 94%   BMI 24.14 kg/m       Constitutional: Oriented to person, place, and time and appears well-developed.   HEENT:  Normocephalic and atraumatic.  Eyes: Conjunctivae and EOM are normal. Pupils are equal, round, and reactive to light. No discharge.  No scleral icterus. Nose normal. Mouth/Throat: Oropharynx is clear and moist. No oropharyngeal exudate.    NECK: Normal range of motion. Neck supple. No JVD present. No tracheal deviation present. No thyromegaly present.   CARDIOVASCULAR: Normal rate, regular rhythm and intact distal pulses.  Exam reveals no gallop and no friction rub.  Systolic murmur present.  Midline chest wall " incision is healing  PULMONARY: Effort normal and breath sounds normal. No respiratory distress.No Wheezing or rales.  ABDOMEN: Soft. Bowel sounds are normal. No distension and no mass.  There is no tenderness. There is no rebound and no guarding. No HSM.  MUSCULOSKELETAL: Normal range of motion. Mild kyphosis, no tenderness.  LYMPH NODES: Has no cervical, supraclavicular, axillary and groin adenopathy.   NEUROLOGICAL: Alert and oriented to person, place, and time. No cranial nerve deficit.  Normal muscle tone. Coordination normal.   GENITOURINARY: Deferred exam.  SKIN: Skin is warm and dry. No rash noted. No erythema. No pallor.   EXTREMITIES: No cyanosis, no clubbing, no edema. No Deformity.  Missing toe  PSYCHIATRIC: Normal mood, affect and behavior.      Lab Results     Recent Results (from the past 240 hours)   Glucose (OUTREACH)    Collection Time: 12/02/24  9:44 AM   Result Value Ref Range    Glucose 202 (H) 70 - 99 mg/dL   Basic Metabolic Panel No Glucose (OUTREACH)    Collection Time: 12/02/24  9:44 AM   Result Value Ref Range    Sodium 140 135 - 145 mmol/L    Potassium 4.2 3.4 - 5.3 mmol/L    Chloride 103 98 - 107 mmol/L    Carbon Dioxide (CO2) 21 (L) 22 - 29 mmol/L    Anion Gap 16 (H) 7 - 15 mmol/L    Urea Nitrogen 23.8 (H) 8.0 - 23.0 mg/dL    Creatinine 1.39 (H) 0.67 - 1.17 mg/dL    GFR Estimate 51 (L) >60 mL/min/1.73m2    Calcium 8.7 (L) 8.8 - 10.4 mg/dL   CBC with platelets and differential    Collection Time: 12/02/24  9:44 AM   Result Value Ref Range    WBC Count 13.2 (H) 4.0 - 11.0 10e3/uL    RBC Count 3.78 (L) 4.40 - 5.90 10e6/uL    Hemoglobin 10.7 (L) 13.3 - 17.7 g/dL    Hematocrit 35.5 (L) 40.0 - 53.0 %    MCV 94 78 - 100 fL    MCH 28.3 26.5 - 33.0 pg    MCHC 30.1 (L) 31.5 - 36.5 g/dL    RDW 16.0 (H) 10.0 - 15.0 %    Platelet Count 443 150 - 450 10e3/uL    % Neutrophils 81 %    % Lymphocytes 10 %    % Monocytes 7 %    % Eosinophils 1 %    % Basophils 1 %    % Immature Granulocytes 1 %    NRBCs  per 100 WBC 0 <1 /100    Absolute Neutrophils 10.6 (H) 1.6 - 8.3 10e3/uL    Absolute Lymphocytes 1.3 0.8 - 5.3 10e3/uL    Absolute Monocytes 0.9 0.0 - 1.3 10e3/uL    Absolute Eosinophils 0.2 0.0 - 0.7 10e3/uL    Absolute Basophils 0.1 0.0 - 0.2 10e3/uL    Absolute Immature Granulocytes 0.1 <=0.4 10e3/uL    Absolute NRBCs 0.0 10e3/uL   CBC with platelets    Collection Time: 12/03/24 10:06 AM   Result Value Ref Range    WBC Count 11.6 (H) 4.0 - 11.0 10e3/uL    RBC Count 4.02 (L) 4.40 - 5.90 10e6/uL    Hemoglobin 11.1 (L) 13.3 - 17.7 g/dL    Hematocrit 38.2 (L) 40.0 - 53.0 %    MCV 95 78 - 100 fL    MCH 27.6 26.5 - 33.0 pg    MCHC 29.1 (L) 31.5 - 36.5 g/dL    RDW 15.8 (H) 10.0 - 15.0 %    Platelet Count 487 (H) 150 - 450 10e3/uL   Magnesium    Collection Time: 12/03/24 10:06 AM   Result Value Ref Range    Magnesium 2.3 1.7 - 2.3 mg/dL   Glucose (OUTREACH)    Collection Time: 12/03/24 10:06 AM   Result Value Ref Range    Glucose 181 (H) 70 - 99 mg/dL   Basic Metabolic Panel No Glucose (OUTREACH)    Collection Time: 12/03/24 10:06 AM   Result Value Ref Range    Sodium 139 135 - 145 mmol/L    Potassium 4.5 3.4 - 5.3 mmol/L    Chloride 103 98 - 107 mmol/L    Carbon Dioxide (CO2) 22 22 - 29 mmol/L    Anion Gap 14 7 - 15 mmol/L    Urea Nitrogen 23.5 (H) 8.0 - 23.0 mg/dL    Creatinine 1.35 (H) 0.67 - 1.17 mg/dL    GFR Estimate 53 (L) >60 mL/min/1.73m2    Calcium 9.1 8.8 - 10.4 mg/dL   Uric acid    Collection Time: 12/03/24 10:06 AM   Result Value Ref Range    Uric Acid 6.9 3.4 - 7.0 mg/dL   CBC with platelets    Collection Time: 12/06/24  6:47 AM   Result Value Ref Range    WBC Count 10.7 4.0 - 11.0 10e3/uL    RBC Count 3.70 (L) 4.40 - 5.90 10e6/uL    Hemoglobin 10.3 (L) 13.3 - 17.7 g/dL    Hematocrit 34.3 (L) 40.0 - 53.0 %    MCV 93 78 - 100 fL    MCH 27.8 26.5 - 33.0 pg    MCHC 30.0 (L) 31.5 - 36.5 g/dL    RDW 15.4 (H) 10.0 - 15.0 %    Platelet Count 448 150 - 450 10e3/uL             Electronically signed by    Anayeli Emery  MD

## 2024-12-10 ENCOUNTER — TRANSITIONAL CARE UNIT VISIT (OUTPATIENT)
Dept: GERIATRICS | Facility: CLINIC | Age: 79
End: 2024-12-10
Payer: COMMERCIAL

## 2024-12-10 VITALS
TEMPERATURE: 97.7 F | SYSTOLIC BLOOD PRESSURE: 137 MMHG | WEIGHT: 178 LBS | RESPIRATION RATE: 19 BRPM | DIASTOLIC BLOOD PRESSURE: 79 MMHG | HEIGHT: 72 IN | HEART RATE: 69 BPM | OXYGEN SATURATION: 94 % | BODY MASS INDEX: 24.11 KG/M2

## 2024-12-10 DIAGNOSIS — M54.50 CHRONIC LOW BACK PAIN WITHOUT SCIATICA, UNSPECIFIED BACK PAIN LATERALITY: ICD-10-CM

## 2024-12-10 DIAGNOSIS — I10 PRIMARY HYPERTENSION: ICD-10-CM

## 2024-12-10 DIAGNOSIS — E66.01 MORBID OBESITY (H): ICD-10-CM

## 2024-12-10 DIAGNOSIS — Z89.411 ACQUIRED ABSENCE OF RIGHT GREAT TOE (H): ICD-10-CM

## 2024-12-10 DIAGNOSIS — G47.00 INSOMNIA, UNSPECIFIED TYPE: ICD-10-CM

## 2024-12-10 DIAGNOSIS — L97.509 TYPE 2 DIABETES MELLITUS WITH FOOT ULCER, WITHOUT LONG-TERM CURRENT USE OF INSULIN (H): ICD-10-CM

## 2024-12-10 DIAGNOSIS — I50.22 CHRONIC SYSTOLIC CONGESTIVE HEART FAILURE (H): Primary | ICD-10-CM

## 2024-12-10 DIAGNOSIS — E11.621 TYPE 2 DIABETES MELLITUS WITH FOOT ULCER, WITHOUT LONG-TERM CURRENT USE OF INSULIN (H): ICD-10-CM

## 2024-12-10 DIAGNOSIS — M1A.00X0 IDIOPATHIC CHRONIC GOUT WITHOUT TOPHUS, UNSPECIFIED SITE: ICD-10-CM

## 2024-12-10 DIAGNOSIS — I21.4 NSTEMI (NON-ST ELEVATED MYOCARDIAL INFARCTION) (H): ICD-10-CM

## 2024-12-10 DIAGNOSIS — G89.29 CHRONIC LOW BACK PAIN WITHOUT SCIATICA, UNSPECIFIED BACK PAIN LATERALITY: ICD-10-CM

## 2024-12-10 PROBLEM — R35.0 URINARY FREQUENCY: Status: ACTIVE | Noted: 2024-10-09

## 2024-12-10 PROBLEM — I50.9 CHF (CONGESTIVE HEART FAILURE) (H): Status: ACTIVE | Noted: 2024-09-18

## 2024-12-10 NOTE — LETTER
12/10/2024      Lance Ibrahim  [address]  [facility] [id number]        [facility]       Patient Lance Ibrahim  MRN: [id number]        Reason for Visit     Chief Complaint   Patient presents with     RECHECK     INITIAL       Code Status     CPR/Full code     Assessment/plan     NSTEMI S/P CABG X3 ON 11/14/24  Patient underwent above-mentioned procedure as well as endoscopic vein harvest from the left leg.  Tolerated the procedure well and discharged to the TCU  Continue with his incisional cares  Was in the emergency room recently for recurrence of chest pain  Now reporting improvement and feels he is ready to go home   has an upcoming appointment with cardiology    Recent hospitalization due to discitis/osteomyelitis L4-L5 with severe foraminal narrowing status post IR guided L4-L5 disc aspiration and L4 biopsy done on 6/19/2024.  Presence of discitis osteomyelitis at L4-L5 with epidural abscess.  Severe spinal stenosis  Wa in the TCU for a lengthy stay because of need for IV antibiotics  Back pain is controlled    History of chronic back pain with spinal stenosis  Currently only on Tylenol  Also takes gabapentin as needed  Pain is controlled    Chronic gouty arthropathy requiring steroids and colchicine now improved    Type 2 diabetes uncontrolled with an A1c of 8  On oral meds including glipizide and semaglutide.  Now on Lantus and dosage has been increased with improvement noted in his blood sugars  Continue with blood sugar checks    Diabetic neuropathy on gabapentin 3 times a day prn.  No pain concerns    Congestive heart failure with preserved ejection fraction, mild aortic stenosis.  Continue with medical management with metoprolol.  Also on diuretics including Bumex and spironolactone.  No evidence of volume overload    Hypertension monitor blood pressures he is on metoprolol as well as diuretics including Bumex and spironolactone.  Blood pressures appear to be stable.    CKD 3 A  monitor kidney functions discharge creatinine is on recheck 1.3 which appears to be baseline number for him.    Chronic anemia on iron supplementation.HG 11  Leukocytosis had improved to    PAD he is on aspirin                  Obstructive sleep apnea does not use CPAP  Has a issue with uvala which is chronic and is a side sleeper  Gets hypoxic at HS and needs O2\    Chronic insomnia he takes trazodone                  Generalized weakness he resides in assisted living facility now discharged to the TCU for strengthening and rehab.    Reports he is doing better.  And planning to discharge home soon.  He has an appointment with cardiology  Recheck labs done recently reviewed hemoglobin is now improved to 10.3 with improvement in his leukocytosis  Kidney functions reviewed and his creatinine is 1.3 which appears to be at baseline number for him    History     Patient is a very pleasant 79 year old male who is admitted to TCU  Patient presented with NSTEMI.  He was electively taken to the hospital and underwent CABG x 3.  Postprocedure did well discharged to the TCU.  Unfortunately had another episode of chest pain was sent to the emergency room.  Now reports doing well and ready to discharge home soon no incisional pain reported    Past Medical & Surgical History     PAST MEDICAL HISTORY:   Past Medical History:   Diagnosis Date     Anemia      Arthritis     osteoarthritis knees     BPH      CAD (coronary artery disease)     subtotal occlusion in the small distal LAD      Cardiomyopathy      Cerebral artery occlusion with cerebral infarction     TIA 1993, no residual     Cervicalgia      CHF (congestive heart failure) (H)      Chronic kidney disease      Chronic low back pain      Chronic rhinitis      Gouty arthropathy     hands     Hyperlipidemia      Hypertension      Kidney stone      Nocturia      Obesity      Osteomyelitis (H) 08/2023    Foot     PVD (peripheral vascular disease)      Sleep apnea     doesn't use  cpap     Spinal cord stimulator status 04/2024    Spinal cord stimulator in place     TIA (transient ischaemic attack) 1993     Type 2 diabetes mellitus - 11/08/2018     Ureteral stone       PAST SURGICAL HISTORY:   has a past surgical history that includes Diastasis recti repair (1985); Ventral hernia repair NOS (1987); ORIF Shoulder (Left); Colonoscopy (03/09/2013); hernia repair (07/13/2004); Foot surgery (04/2013); Amputate toe(s) (Left, 10/15/2018); Arthroplasty knee (Right, 12/07/2018); Heart Catheterization with Possible Intervention (Left, 03/05/2019); Extracapsular cataract extration with intraocular lens implant (Left, 03/13/2017); Extracapsular cataract extration with intraocular lens implant (Right); Amputate foot (Right, 08/07/2023); Esophagoscopy, gastroscopy, duodenoscopy (EGD), combined (N/A, 04/30/2024); Endoscopic retrograde cholangiopancreatogram (N/A, 04/30/2024); Spinal Cord Stimulator Implant (04/03/2024); Prostate surgery (02/2024); IR Disc Aspriation Injection (6/19/2024); Irrigation and debridement shoulder, combined (Right, 8/7/2024); Resect clavicle distal (Right, 8/7/2024); Irrigation and debridement upper extremity, combined (Right, 8/7/2024); Coronary Angiogram (N/A, 11/14/2024); Intravascular Ultrasound (N/A, 11/14/2024); Left Heart Catheterization (N/A, 11/14/2024); Intra aortic Balloon Pump Insertion (N/A, 11/16/2024); Coronary Artery Bypass Graft, With Endoscopic Vessel Procurement (N/A, 11/18/2024); and Transesophageal echocardiogram intraoperative (N/A, 11/18/2024).      Past Social History     Reviewed,  reports that he quit smoking about 31 years ago. His smoking use included cigarettes. He has never been exposed to tobacco smoke. He has never used smokeless tobacco. He reports that he does not currently use alcohol. He reports that he does not use drugs.    Family History     Reviewed, and family history includes Alcohol/Drug in his paternal grandfather; Cancer in his father;  Diabetes in his maternal grandfather; Family History Negative in his mother.    Medication List     Current Outpatient Medications   Medication Sig Dispense Refill     acetaminophen (TYLENOL) 500 MG tablet Take 1,000 mg by mouth 3 times daily. TID while awake and PRN nightly       allopurinol (ZYLOPRIM) 100 MG tablet Take 100 mg by mouth daily.       aspirin (ASA) 81 MG chewable tablet Take 2 tablets (162 mg) by mouth daily.       atorvastatin (LIPITOR) 40 MG tablet Take 1 tablet (40 mg) by mouth every evening.       bumetanide (BUMEX) 1 MG tablet Take 1 tablet (1 mg) by mouth daily.       calcium carbonate (TUMS) 500 MG chewable tablet Take 1,000 mg by mouth 3 times daily as needed for heartburn       Ferrous Gluconate 324 (37.5 Fe) MG TABS Take 1 tablet by mouth daily.       finasteride (PROSCAR) 5 MG tablet Take 5 mg by mouth daily       gabapentin (NEURONTIN) 300 MG capsule Take 1 capsule (300 mg) by mouth 3 times daily as needed.       glipiZIDE (GLUCOTROL XL) 2.5 MG 24 hr tablet Take 1 tablet (2.5 mg) by mouth daily.       insulin glargine (LANTUS PEN) 100 UNIT/ML pen Inject 10 Units subcutaneously 2 times daily.       metoprolol succinate ER (TOPROL XL) 100 MG 24 hr tablet Take 1 tablet (100 mg) by mouth daily.       pantoprazole (PROTONIX) 40 MG EC tablet Take 1 tablet (40 mg) by mouth every morning (before breakfast).       sodium bicarbonate 650 MG tablet Take 3 tablets (1,950 mg) by mouth 3 times daily.       spironolactone (ALDACTONE) 25 MG tablet Take 1 tablet (25 mg) by mouth daily.       traZODone (DESYREL) 50 MG tablet Take 25 mg by mouth at bedtime.       ursodiol (ACTIGALL) 300 MG capsule Take 1 capsule (300 mg) by mouth 2 times daily 60 capsule 0     White Petrolatum-Mineral Oil (LUBRIFRESH P.M.) OINT Apply 1 g to eye 2 times daily as needed for dry eyes.       No current facility-administered medications for this visit.      MED REC REQUIRED  Post Medication Reconciliation Status:       "    Allergies     Allergies   Allergen Reactions     Ancef [Cefazolin] Rash     Cephalexin Itching and Rash     Allergic reaction required hospitalization 4/2024     Penicillins Anaphylaxis     Other Drug Allergy (See Comments) Unknown     Sulfa Antibiotics      \"itchy rash, swelling of face and hands\"       Review of Systems   A comprehensive review of 14 systems was done. Pertinent findings noted here and in history of present illness. All the rest negative.  Constitutional: Negative.  Negative for fever, chills, he has  activity change, appetite change and fatigue.   HENT: Negative for congestion and facial swelling.    Eyes: Negative for photophobia, redness and visual disturbance.   Respiratory: Negative for cough and chest tightness.    Cardiovascular: Negative for chest pain, palpitations and leg swelling.   Midline chest wall incision is healing  Gastrointestinal: Negative for nausea, diarrhea, constipation, blood in stool and abdominal distention.   Genitourinary: Negative.    Musculoskeletal: Negative.does not walk much at baseline    Back pain is improved.  Skin: Negative.    Neurological: Negative for dizziness, tremors, syncope, weakness, light-headedness and headaches.   Hematological: Does not bruise/bleed easily.   Psychiatric/Behavioral: Negative.  Feels ready to go home      Physical Exam   /79   Pulse 69   Temp 97.7  F (36.5  C)   Resp 19   Ht 1.829 m (6')   Wt 80.7 kg (178 lb)   SpO2 94%   BMI 24.14 kg/m       Constitutional: Oriented to person, place, and time and appears well-developed.   HEENT:  Normocephalic and atraumatic.  Eyes: Conjunctivae and EOM are normal. Pupils are equal, round, and reactive to light. No discharge.  No scleral icterus. Nose normal. Mouth/Throat: Oropharynx is clear and moist. No oropharyngeal exudate.    NECK: Normal range of motion. Neck supple. No JVD present. No tracheal deviation present. No thyromegaly present.   CARDIOVASCULAR: Normal rate, regular " rhythm and intact distal pulses.  Exam reveals no gallop and no friction rub.  Systolic murmur present.  Midline chest wall incision is healing  PULMONARY: Effort normal and breath sounds normal. No respiratory distress.No Wheezing or rales.  ABDOMEN: Soft. Bowel sounds are normal. No distension and no mass.  There is no tenderness. There is no rebound and no guarding. No HSM.  MUSCULOSKELETAL: Normal range of motion. Mild kyphosis, no tenderness.  LYMPH NODES: Has no cervical, supraclavicular, axillary and groin adenopathy.   NEUROLOGICAL: Alert and oriented to person, place, and time. No cranial nerve deficit.  Normal muscle tone. Coordination normal.   GENITOURINARY: Deferred exam.  SKIN: Skin is warm and dry. No rash noted. No erythema. No pallor.   EXTREMITIES: No cyanosis, no clubbing, no edema. No Deformity.  Missing toe  PSYCHIATRIC: Normal mood, affect and behavior.      Lab Results     Recent Results (from the past 240 hours)   Glucose (OUTREACH)    Collection Time: 12/02/24  9:44 AM   Result Value Ref Range    Glucose 202 (H) 70 - 99 mg/dL   Basic Metabolic Panel No Glucose (OUTREACH)    Collection Time: 12/02/24  9:44 AM   Result Value Ref Range    Sodium 140 135 - 145 mmol/L    Potassium 4.2 3.4 - 5.3 mmol/L    Chloride 103 98 - 107 mmol/L    Carbon Dioxide (CO2) 21 (L) 22 - 29 mmol/L    Anion Gap 16 (H) 7 - 15 mmol/L    Urea Nitrogen 23.8 (H) 8.0 - 23.0 mg/dL    Creatinine 1.39 (H) 0.67 - 1.17 mg/dL    GFR Estimate 51 (L) >60 mL/min/1.73m2    Calcium 8.7 (L) 8.8 - 10.4 mg/dL   CBC with platelets and differential    Collection Time: 12/02/24  9:44 AM   Result Value Ref Range    WBC Count 13.2 (H) 4.0 - 11.0 10e3/uL    RBC Count 3.78 (L) 4.40 - 5.90 10e6/uL    Hemoglobin 10.7 (L) 13.3 - 17.7 g/dL    Hematocrit 35.5 (L) 40.0 - 53.0 %    MCV 94 78 - 100 fL    MCH 28.3 26.5 - 33.0 pg    MCHC 30.1 (L) 31.5 - 36.5 g/dL    RDW 16.0 (H) 10.0 - 15.0 %    Platelet Count 443 150 - 450 10e3/uL    % Neutrophils 81 %     % Lymphocytes 10 %    % Monocytes 7 %    % Eosinophils 1 %    % Basophils 1 %    % Immature Granulocytes 1 %    NRBCs per 100 WBC 0 <1 /100    Absolute Neutrophils 10.6 (H) 1.6 - 8.3 10e3/uL    Absolute Lymphocytes 1.3 0.8 - 5.3 10e3/uL    Absolute Monocytes 0.9 0.0 - 1.3 10e3/uL    Absolute Eosinophils 0.2 0.0 - 0.7 10e3/uL    Absolute Basophils 0.1 0.0 - 0.2 10e3/uL    Absolute Immature Granulocytes 0.1 <=0.4 10e3/uL    Absolute NRBCs 0.0 10e3/uL   CBC with platelets    Collection Time: 12/03/24 10:06 AM   Result Value Ref Range    WBC Count 11.6 (H) 4.0 - 11.0 10e3/uL    RBC Count 4.02 (L) 4.40 - 5.90 10e6/uL    Hemoglobin 11.1 (L) 13.3 - 17.7 g/dL    Hematocrit 38.2 (L) 40.0 - 53.0 %    MCV 95 78 - 100 fL    MCH 27.6 26.5 - 33.0 pg    MCHC 29.1 (L) 31.5 - 36.5 g/dL    RDW 15.8 (H) 10.0 - 15.0 %    Platelet Count 487 (H) 150 - 450 10e3/uL   Magnesium    Collection Time: 12/03/24 10:06 AM   Result Value Ref Range    Magnesium 2.3 1.7 - 2.3 mg/dL   Glucose (OUTREACH)    Collection Time: 12/03/24 10:06 AM   Result Value Ref Range    Glucose 181 (H) 70 - 99 mg/dL   Basic Metabolic Panel No Glucose (OUTREACH)    Collection Time: 12/03/24 10:06 AM   Result Value Ref Range    Sodium 139 135 - 145 mmol/L    Potassium 4.5 3.4 - 5.3 mmol/L    Chloride 103 98 - 107 mmol/L    Carbon Dioxide (CO2) 22 22 - 29 mmol/L    Anion Gap 14 7 - 15 mmol/L    Urea Nitrogen 23.5 (H) 8.0 - 23.0 mg/dL    Creatinine 1.35 (H) 0.67 - 1.17 mg/dL    GFR Estimate 53 (L) >60 mL/min/1.73m2    Calcium 9.1 8.8 - 10.4 mg/dL   Uric acid    Collection Time: 12/03/24 10:06 AM   Result Value Ref Range    Uric Acid 6.9 3.4 - 7.0 mg/dL   CBC with platelets    Collection Time: 12/06/24  6:47 AM   Result Value Ref Range    WBC Count 10.7 4.0 - 11.0 10e3/uL    RBC Count 3.70 (L) 4.40 - 5.90 10e6/uL    Hemoglobin 10.3 (L) 13.3 - 17.7 g/dL    Hematocrit 34.3 (L) 40.0 - 53.0 %    MCV 93 78 - 100 fL    MCH 27.8 26.5 - 33.0 pg    MCHC 30.0 (L) 31.5 - 36.5 g/dL     RDW 15.4 (H) 10.0 - 15.0 %    Platelet Count 448 150 - 450 10e3/uL             Electronically signed by    Anayeli Emery MD                            Sincerely,        MIRELLA Fallon

## 2024-12-16 ENCOUNTER — DISCHARGE SUMMARY NURSING HOME (OUTPATIENT)
Dept: GERIATRICS | Facility: CLINIC | Age: 79
End: 2024-12-16
Payer: COMMERCIAL

## 2024-12-16 VITALS
HEART RATE: 70 BPM | DIASTOLIC BLOOD PRESSURE: 64 MMHG | OXYGEN SATURATION: 97 % | SYSTOLIC BLOOD PRESSURE: 125 MMHG | TEMPERATURE: 97.5 F | BODY MASS INDEX: 23.83 KG/M2 | RESPIRATION RATE: 18 BRPM | WEIGHT: 175.9 LBS | HEIGHT: 72 IN

## 2024-12-16 DIAGNOSIS — R52 PAIN MANAGEMENT: ICD-10-CM

## 2024-12-16 DIAGNOSIS — I21.3 ST ELEVATION MYOCARDIAL INFARCTION (STEMI), UNSPECIFIED ARTERY (H): Primary | ICD-10-CM

## 2024-12-16 DIAGNOSIS — I50.22 CHRONIC SYSTOLIC CONGESTIVE HEART FAILURE (H): ICD-10-CM

## 2024-12-16 DIAGNOSIS — R53.81 PHYSICAL DECONDITIONING: ICD-10-CM

## 2024-12-16 PROCEDURE — 99315 NF DSCHRG MGMT 30 MIN/LESS: CPT | Performed by: NURSE PRACTITIONER

## 2024-12-16 NOTE — PROGRESS NOTES
[facility] GERIATRIC SERVICES  Chief Complaint   Patient presents with    Discharge Summary [facility] Medical Record Number:  [id number]  Place of Service where encounter took place:  [facility] ([facility]) [[id number]]  Code Status:  Full Code     HISTORY:      HPI:  Lance Ibrahim  is 79 year old (1945) undergoing physical and occupational therapy.    He is with past medical history significant for HFpEF, diabetes, gout,  Excerpted from records  who presented with a STEMI on 11/14/24.  Subsequent coronary angiography revealed very tight left main disease and a totally occluded RCA. Over the weekend he had chest pain at rest and an intra-aortic balloon pump was placed. He was referred to CV surgery for evaluation for possible coronary artery revascularization.      Patient was deemed a candidate for coronary artery bypass surgery, and was taken to the operating room on 11/18/24 where patient underwent 3 vessel coronary artery bypass and endoscopic vein harvest from the left leg. Surgery was uneventful and patient was brought to the ICU post-operatively.IABP was removed POD#1. He was extubated on POD#2 and weaned from pressors. Patient was awake and alert, afebrile, and with stable vitals. Insulin drip was discontinued and he was transitioned to a sliding scale. He was transferred to general telemetry status on POD#4 where patient has had return of bowel function, is maintaining oxygen saturations on room air, had his chest tubes removed, and has no complaints of chest pain or shortness of breath. On 11/26/24, patient was stable enough to be discharged to TCU.     Of note, postop echo showed EF 25%, therefore a Lifevest was ordered and place on pt.before discharge to TCU. Lifevest ordered for 3 mos or until heart failure discontinues it. Heart failure was consulted and appropriate medications were prescribed with follow up appointments.     Today he is seen to review VS, routine visit and a  face-to-face for discharge.  He will discharge to [facility] living facility on 12/17/2024 with current medications and treatments.  He will have home care services PT OT home health aide RN.  He denied chest pain shortness of breath cough or congestion.  He is status post CABG x 3.   Currently wearing a LifeVest. Pain is controlled with current medications.  Weights reviewed he is down 6.5 pounds over the last week.  Midline surgical incision clean dry and intact open to air, left medial left leg incisions clean dry intact open to air.  His recent left elbow gout flareup has resolved       ALLERGIES:Ancef [cefazolin], Cephalexin, Penicillins, Other drug allergy (see comments), and Sulfa antibiotics    PAST MEDICAL HISTORY:   Past Medical History:   Diagnosis Date    Anemia     Arthritis     osteoarthritis knees    BPH     CAD (coronary artery disease)     subtotal occlusion in the small distal LAD     Cardiomyopathy     Cerebral artery occlusion with cerebral infarction     TIA 1993, no residual    Cervicalgia     CHF (congestive heart failure) (H)     Chronic kidney disease     Chronic low back pain     Chronic rhinitis     Gouty arthropathy     hands    Hyperlipidemia     Hypertension     Kidney stone     Nocturia     Obesity     Osteomyelitis (H) 08/2023    Foot    PVD (peripheral vascular disease)     Sleep apnea     doesn't use cpap    Spinal cord stimulator status 04/2024    Spinal cord stimulator in place    TIA (transient ischaemic attack) 1993    Type 2 diabetes mellitus - 11/08/2018    Ureteral stone        PAST SURGICAL HISTORY:   has a past surgical history that includes Diastasis recti repair (1985); Ventral hernia repair NOS (1987); ORIF Shoulder (Left); Colonoscopy (03/09/2013); hernia repair (07/13/2004); Foot surgery (04/2013); Amputate toe(s) (Left, 10/15/2018); Arthroplasty knee (Right, 12/07/2018); Heart Catheterization with Possible Intervention (Left, 03/05/2019); Extracapsular  cataract extration with intraocular lens implant (Left, 03/13/2017); Extracapsular cataract extration with intraocular lens implant (Right); Amputate foot (Right, 08/07/2023); Esophagoscopy, gastroscopy, duodenoscopy (EGD), combined (N/A, 04/30/2024); Endoscopic retrograde cholangiopancreatogram (N/A, 04/30/2024); Spinal Cord Stimulator Implant (04/03/2024); Prostate surgery (02/2024); IR Disc Aspriation Injection (6/19/2024); Irrigation and debridement shoulder, combined (Right, 8/7/2024); Resect clavicle distal (Right, 8/7/2024); Irrigation and debridement upper extremity, combined (Right, 8/7/2024); Coronary Angiogram (N/A, 11/14/2024); Intravascular Ultrasound (N/A, 11/14/2024); Left Heart Catheterization (N/A, 11/14/2024); Intra aortic Balloon Pump Insertion (N/A, 11/16/2024); Coronary Artery Bypass Graft, With Endoscopic Vessel Procurement (N/A, 11/18/2024); and Transesophageal echocardiogram intraoperative (N/A, 11/18/2024).    FAMILY HISTORY: family history includes Alcohol/Drug in his paternal grandfather; Cancer in his father; Diabetes in his maternal grandfather; Family History Negative in his mother.    SOCIAL HISTORY:  reports that he quit smoking about 31 years ago. His smoking use included cigarettes. He has never been exposed to tobacco smoke. He has never used smokeless tobacco. He reports that he does not currently use alcohol. He reports that he does not use drugs.    ROS:  Constitutional: Negative for activity change, appetite change, fatigue and fever. Hasn't ambulated in months due to bone on bone left knee   HENT: Negative for congestion.    Respiratory: Negative for cough, shortness of breath and wheezing.    Cardiovascular: Negative for chest pain and leg swelling.  Wearing a LifeVest  Gastrointestinal: Negative for abdominal distention, abdominal pain, constipation, diarrhea and nausea.   Genitourinary: Negative for dysuria.   Musculoskeletal:   Skin: Negative for color change and wound.   Midline surgical incision, surgical wounds left medial leg   Neurological: Negative for dizziness.   Psychiatric/Behavioral: Negative for agitation, behavioral problems and confusion.     Physical Exam:  Constitutional:       Appearance: Patient is well-developed.   HENT:      Head: Normocephalic.   Eyes:      Conjunctiva/sclera: Conjunctivae normal.   Neck:      Musculoskeletal: Normal range of motion.   Cardiovascular:      Rate and Rhythm: Normal rate and regular rhythm.      Heart sounds: Normal heart sounds. No murmur. Wearing a life vest   Pulmonary:      Effort: No respiratory distress.      Breath sounds: Normal breath sounds.   no wheezing or rales.   Abdominal:      General: Bowel sounds are normal. There is no distension.      Palpations: Abdomen is soft.      Tenderness: There is no abdominal tenderness.   Musculoskeletal:       Normal range of motion.  not ambulating per his report   Skin:General:        Skin is warm. Midline chest incision C/D/I TANVI, medial left leg incision   Neurological:         Mental Status: Patient is alert and oriented to person, place, and time.   Psychiatric:         Behavior: Behavior normal.     Vitals:/64   Pulse 70   Temp 97.5  F (36.4  C)   Resp 18   Ht 1.829 m (6')   Wt 79.8 kg (175 lb 14.4 oz)   SpO2 97%   BMI 23.86 kg/m   and Body mass index is 23.86 kg/m .    Lab/Diagnostic data:   No results found for this or any previous visit (from the past 240 hours).     Estimated Creatinine Clearance: 49.3 mL/min (A) (based on SCr of 1.35 mg/dL (H)).     MEDICATIONS:  MED REC REQUIRED  Post Medication Reconciliation Status: discharge medications reconciled, continue medications without change          Review of your medicines            Accurate as of December 16, 2024 11:38 AM. If you have any questions, ask your nurse or doctor.                CONTINUE these medicines which may have CHANGED, or have new prescriptions. If we are uncertain of the size of  tablets/capsules you have at home, strength may be listed as something that might have changed.        Dose / Directions   insulin glargine 100 UNIT/ML pen  Commonly known as: LANTUS PEN  This may have changed: how much to take  Used for: S/P CABG x 3      Dose: 10 Units  Inject 10 Units subcutaneously 2 times daily.  Refills: 0            CONTINUE these medicines which have NOT CHANGED        Dose / Directions   acetaminophen 500 MG tablet  Commonly known as: TYLENOL      Dose: 1,000 mg  Take 1,000 mg by mouth 3 times daily. TID while awake and PRN nightly  Refills: 0     allopurinol 100 MG tablet  Commonly known as: ZYLOPRIM      Dose: 100 mg  Take 100 mg by mouth daily.  Refills: 0     aspirin 81 MG chewable tablet  Commonly known as: ASA  Used for: S/P CABG x 3      Dose: 162 mg  Take 2 tablets (162 mg) by mouth daily.  Refills: 0     atorvastatin 40 MG tablet  Commonly known as: LIPITOR  Used for: S/P CABG x 3      Dose: 40 mg  Take 1 tablet (40 mg) by mouth every evening.  Refills: 0     bumetanide 1 MG tablet  Commonly known as: BUMEX  Used for: S/P CABG x 3      Dose: 1 mg  Take 1 tablet (1 mg) by mouth daily.  Refills: 0     diclofenac 1 % topical gel  Commonly known as: VOLTAREN      Dose: 2 g  Apply 2 g topically 2 times daily as needed for moderate pain.  Refills: 0     Ferrous Gluconate 324 (37.5 Fe) MG Tabs      Dose: 1 tablet  Take 1 tablet by mouth daily.  Refills: 0     finasteride 5 MG tablet  Commonly known as: PROSCAR      Dose: 5 mg  Take 5 mg by mouth daily  Refills: 0     gabapentin 300 MG capsule  Commonly known as: NEURONTIN  Used for: S/P CABG x 3      Dose: 300 mg  Take 1 capsule (300 mg) by mouth 3 times daily as needed.  Refills: 0     glipiZIDE 2.5 MG 24 hr tablet  Commonly known as: GLUCOTROL XL  Used for: S/P CABG x 3      Dose: 2.5 mg  Take 1 tablet (2.5 mg) by mouth daily.  Refills: 0     LubriFresh P.M. Oint  Used for: S/P CABG x 3      Dose: 1 Application  Apply 1 g to eye 2 times  daily as needed for dry eyes.  Refills: 0     metoprolol succinate  MG 24 hr tablet  Commonly known as: TOPROL XL  Used for: S/P CABG x 3      Dose: 100 mg  Take 1 tablet (100 mg) by mouth daily.  Refills: 0     pantoprazole 40 MG EC tablet  Commonly known as: PROTONIX  Used for: S/P CABG x 3      Dose: 40 mg  Take 1 tablet (40 mg) by mouth every morning (before breakfast).  Refills: 0     sodium bicarbonate 650 MG tablet  Used for: S/P CABG x 3      Dose: 1,950 mg  Take 3 tablets (1,950 mg) by mouth 3 times daily.  Refills: 0     spironolactone 25 MG tablet  Commonly known as: ALDACTONE  Used for: S/P CABG x 3      Dose: 25 mg  Take 1 tablet (25 mg) by mouth daily.  Refills: 0     traZODone 50 MG tablet  Commonly known as: DESYREL      Dose: 25 mg  Take 25 mg by mouth at bedtime.  Refills: 0     Tums 500 MG chewable tablet  Generic drug: calcium carbonate      Dose: 1,000 mg  Take 1,000 mg by mouth 3 times daily as needed for heartburn  Refills: 0     ursodiol 300 MG capsule  Commonly known as: ACTIGALL  Used for: Biliary stricture (H)      Dose: 300 mg  Take 1 capsule (300 mg) by mouth 2 times daily  Quantity: 60 capsule  Refills: 0              ASSESSMENT/PLAN  Encounter Diagnoses   Name Primary?    ST elevation myocardial infarction (STEMI), unspecified artery (H) Yes    Pain management     Physical deconditioning     Chronic systolic congestive heart failure (H)        CABG x3 Follow up with cardiology, Pain management Life vest, ASA    Pain management scheduled and as needed Tylenol,  spinal cord stimulator, Gabapentin Voltaren gel twice dailyas needed,     HDL on Atorvastatin    Physical deconditioning  PT/OT    Gout allopurinol, recent colchicine x2 and completed prednisone on 12/8/24 resolved    CHF- daily weights, Bumex Spironolactone     Diabetes type 2 continue glipizide XL 2.5 mg daily, glargine increased to 12 units subcu twice daily on 12/6/2024    Hypertension Metoprolol Succinate     Biliary  stricture on Ursodiol    DISCHARGE PLAN/FACE TO FACE:  I certify that services are/were furnished while this patient was under the care of a physician and that a physician or an allowed non-physician practitioner (NPP), had a face-to-face encounter that meets the physician face-to-face encounter requirements. The encounter was in whole, or in part, related to the primary reason for home health. The patient is confined to his/her home and needs intermittent skilled nursing, physical therapy, speech-language pathology, or the continued need for occupational therapy. A plan of care has been established by a physician and is periodically reviewed by a physician.  Date of Face-to-Face Encounter: 12/16/24    I certify that, based on my findings, the following services are medically necessary home health services: PT/OT/HHA/RN    My clinical findings support the need for the above skilled services because: PT OT for continued strength and endurance, home health aide to assist with activities of daily living, RN for vital signs, medication management and did you hear from the people in [place] monitor healing of surgical wounds    This patient is homebound because: He is deconditioned following CABG x 3 and also nonambulatory making him unsafe to leave the home unassisted      The patient is, or has been, under my care and I have initiated the establishment of the plan of care. This patient will be followed by a physician who will periodically review the plan of care.    Schedule follow up visit with primary care provider within 7 days to reestablish care.    Electronically signed by: Darling Valdivia CNP

## 2024-12-16 NOTE — LETTER
12/16/2024      Lance Ibrahim  [address]  [facility] [address]        [facility]  Chief Complaint   Patient presents with     Discharge Summary [facility] [employer] [facility] Medical Record Number:  [id number]  Place of Service where encounter took place:  [facility] ([facility]) [[id number]]  Code Status:  Full Code     HISTORY:      HPI:  Lance Ibrahim  is 79 year old (1945) undergoing physical and occupational therapy.    He is with past medical history significant for HFpEF, diabetes, gout,  Excerpted from records  who presented with a STEMI on 11/14/24.  Subsequent coronary angiography revealed very tight left main disease and a totally occluded RCA. Over the weekend he had chest pain at rest and an intra-aortic balloon pump was placed. He was referred to CV surgery for evaluation for possible coronary artery revascularization.      Patient was deemed a candidate for coronary artery bypass surgery, and was taken to the operating room on 11/18/24 where patient underwent 3 vessel coronary artery bypass and endoscopic vein harvest from the left leg. Surgery was uneventful and patient was brought to the ICU post-operatively.IABP was removed POD#1. He was extubated on POD#2 and weaned from pressors. Patient was awake and alert, afebrile, and with stable vitals. Insulin drip was discontinued and he was transitioned to a sliding scale. He was transferred to general telemetry status on POD#4 where patient has had return of bowel function, is maintaining oxygen saturations on room air, had his chest tubes removed, and has no complaints of chest pain or shortness of breath. On 11/26/24, patient was stable enough to be discharged to TCU.     Of note, postop echo showed EF 25%, therefore a Lifevest was ordered and place on pt.before discharge to TCU. Lifevest ordered for 3 mos or until heart failure discontinues it. Heart failure was consulted and appropriate medications were prescribed with follow  up appointments.     Today he is seen to review VS, routine visit and a face-to-face for discharge.  He will discharge to [facility] assisted living facility on 12/17/2024 with current medications and treatments.  He will have home care services PT OT home health aide RN.  He denied chest pain shortness of breath cough or congestion.  He is status post CABG x 3.   Currently wearing a LifeVest. Pain is controlled with current medications.  Weights reviewed he is down 6.5 pounds over the last week.  Midline surgical incision clean dry and intact open to air, left medial left leg incisions clean dry intact open to air.  His recent left elbow gout flareup has resolved       ALLERGIES:Ancef [cefazolin], Cephalexin, Penicillins, Other drug allergy (see comments), and Sulfa antibiotics    PAST MEDICAL HISTORY:   Past Medical History:   Diagnosis Date     Anemia      Arthritis     osteoarthritis knees     BPH      CAD (coronary artery disease)     subtotal occlusion in the small distal LAD      Cardiomyopathy      Cerebral artery occlusion with cerebral infarction     TIA 1993, no residual     Cervicalgia      CHF (congestive heart failure) (H)      Chronic kidney disease      Chronic low back pain      Chronic rhinitis      Gouty arthropathy     hands     Hyperlipidemia      Hypertension      Kidney stone      Nocturia      Obesity      Osteomyelitis (H) 08/2023    Foot     PVD (peripheral vascular disease)      Sleep apnea     doesn't use cpap     Spinal cord stimulator status 04/2024    Spinal cord stimulator in place     TIA (transient ischaemic attack) 1993     Type 2 diabetes mellitus - 11/08/2018     Ureteral stone        PAST SURGICAL HISTORY:   has a past surgical history that includes Diastasis recti repair (1985); Ventral hernia repair NOS (1987); ORIF Shoulder (Left); Colonoscopy (03/09/2013); hernia repair (07/13/2004); Foot surgery (04/2013); Amputate toe(s) (Left, 10/15/2018); Arthroplasty knee (Right,  12/07/2018); Heart Catheterization with Possible Intervention (Left, 03/05/2019); Extracapsular cataract extration with intraocular lens implant (Left, 03/13/2017); Extracapsular cataract extration with intraocular lens implant (Right); Amputate foot (Right, 08/07/2023); Esophagoscopy, gastroscopy, duodenoscopy (EGD), combined (N/A, 04/30/2024); Endoscopic retrograde cholangiopancreatogram (N/A, 04/30/2024); Spinal Cord Stimulator Implant (04/03/2024); Prostate surgery (02/2024); IR Disc Aspriation Injection (6/19/2024); Irrigation and debridement shoulder, combined (Right, 8/7/2024); Resect clavicle distal (Right, 8/7/2024); Irrigation and debridement upper extremity, combined (Right, 8/7/2024); Coronary Angiogram (N/A, 11/14/2024); Intravascular Ultrasound (N/A, 11/14/2024); Left Heart Catheterization (N/A, 11/14/2024); Intra aortic Balloon Pump Insertion (N/A, 11/16/2024); Coronary Artery Bypass Graft, With Endoscopic Vessel Procurement (N/A, 11/18/2024); and Transesophageal echocardiogram intraoperative (N/A, 11/18/2024).    FAMILY HISTORY: family history includes Alcohol/Drug in his paternal grandfather; Cancer in his father; Diabetes in his maternal grandfather; Family History Negative in his mother.    SOCIAL HISTORY:  reports that he quit smoking about 31 years ago. His smoking use included cigarettes. He has never been exposed to tobacco smoke. He has never used smokeless tobacco. He reports that he does not currently use alcohol. He reports that he does not use drugs.    ROS:  Constitutional: Negative for activity change, appetite change, fatigue and fever. Hasn't ambulated in months due to bone on bone left knee   HENT: Negative for congestion.    Respiratory: Negative for cough, shortness of breath and wheezing.    Cardiovascular: Negative for chest pain and leg swelling.  Wearing a LifeVest  Gastrointestinal: Negative for abdominal distention, abdominal pain, constipation, diarrhea and nausea.    Genitourinary: Negative for dysuria.   Musculoskeletal:   Skin: Negative for color change and wound.  Midline surgical incision, surgical wounds left medial leg   Neurological: Negative for dizziness.   Psychiatric/Behavioral: Negative for agitation, behavioral problems and confusion.     Physical Exam:  Constitutional:       Appearance: Patient is well-developed.   HENT:      Head: Normocephalic.   Eyes:      Conjunctiva/sclera: Conjunctivae normal.   Neck:      Musculoskeletal: Normal range of motion.   Cardiovascular:      Rate and Rhythm: Normal rate and regular rhythm.      Heart sounds: Normal heart sounds. No murmur. Wearing a life vest   Pulmonary:      Effort: No respiratory distress.      Breath sounds: Normal breath sounds.   no wheezing or rales.   Abdominal:      General: Bowel sounds are normal. There is no distension.      Palpations: Abdomen is soft.      Tenderness: There is no abdominal tenderness.   Musculoskeletal:       Normal range of motion.  not ambulating per his report   Skin:General:        Skin is warm. Midline chest incision C/D/I TANVI, medial left leg incision   Neurological:         Mental Status: Patient is alert and oriented to person, place, and time.   Psychiatric:         Behavior: Behavior normal.     Vitals:/64   Pulse 70   Temp 97.5  F (36.4  C)   Resp 18   Ht 1.829 m (6')   Wt 79.8 kg (175 lb 14.4 oz)   SpO2 97%   BMI 23.86 kg/m   and Body mass index is 23.86 kg/m .    Lab/Diagnostic data:   No results found for this or any previous visit (from the past 240 hours).     Estimated Creatinine Clearance: 49.3 mL/min (A) (based on SCr of 1.35 mg/dL (H)).     MEDICATIONS:  MED REC REQUIRED  Post Medication Reconciliation Status: discharge medications reconciled, continue medications without change          Review of your medicines            Accurate as of December 16, 2024 11:38 AM. If you have any questions, ask your nurse or doctor.                CONTINUE these  medicines which may have CHANGED, or have new prescriptions. If we are uncertain of the size of tablets/capsules you have at home, strength may be listed as something that might have changed.        Dose / Directions   insulin glargine 100 UNIT/ML pen  Commonly known as: LANTUS PEN  This may have changed: how much to take  Used for: S/P CABG x 3      Dose: 10 Units  Inject 10 Units subcutaneously 2 times daily.  Refills: 0            CONTINUE these medicines which have NOT CHANGED        Dose / Directions   acetaminophen 500 MG tablet  Commonly known as: TYLENOL      Dose: 1,000 mg  Take 1,000 mg by mouth 3 times daily. TID while awake and PRN nightly  Refills: 0     allopurinol 100 MG tablet  Commonly known as: ZYLOPRIM      Dose: 100 mg  Take 100 mg by mouth daily.  Refills: 0     aspirin 81 MG chewable tablet  Commonly known as: ASA  Used for: S/P CABG x 3      Dose: 162 mg  Take 2 tablets (162 mg) by mouth daily.  Refills: 0     atorvastatin 40 MG tablet  Commonly known as: LIPITOR  Used for: S/P CABG x 3      Dose: 40 mg  Take 1 tablet (40 mg) by mouth every evening.  Refills: 0     bumetanide 1 MG tablet  Commonly known as: BUMEX  Used for: S/P CABG x 3      Dose: 1 mg  Take 1 tablet (1 mg) by mouth daily.  Refills: 0     diclofenac 1 % topical gel  Commonly known as: VOLTAREN      Dose: 2 g  Apply 2 g topically 2 times daily as needed for moderate pain.  Refills: 0     Ferrous Gluconate 324 (37.5 Fe) MG Tabs      Dose: 1 tablet  Take 1 tablet by mouth daily.  Refills: 0     finasteride 5 MG tablet  Commonly known as: PROSCAR      Dose: 5 mg  Take 5 mg by mouth daily  Refills: 0     gabapentin 300 MG capsule  Commonly known as: NEURONTIN  Used for: S/P CABG x 3      Dose: 300 mg  Take 1 capsule (300 mg) by mouth 3 times daily as needed.  Refills: 0     glipiZIDE 2.5 MG 24 hr tablet  Commonly known as: GLUCOTROL XL  Used for: S/P CABG x 3      Dose: 2.5 mg  Take 1 tablet (2.5 mg) by mouth daily.  Refills: 0      LubriFresh P.M. Oint  Used for: S/P CABG x 3      Dose: 1 Application  Apply 1 g to eye 2 times daily as needed for dry eyes.  Refills: 0     metoprolol succinate  MG 24 hr tablet  Commonly known as: TOPROL XL  Used for: S/P CABG x 3      Dose: 100 mg  Take 1 tablet (100 mg) by mouth daily.  Refills: 0     pantoprazole 40 MG EC tablet  Commonly known as: PROTONIX  Used for: S/P CABG x 3      Dose: 40 mg  Take 1 tablet (40 mg) by mouth every morning (before breakfast).  Refills: 0     sodium bicarbonate 650 MG tablet  Used for: S/P CABG x 3      Dose: 1,950 mg  Take 3 tablets (1,950 mg) by mouth 3 times daily.  Refills: 0     spironolactone 25 MG tablet  Commonly known as: ALDACTONE  Used for: S/P CABG x 3      Dose: 25 mg  Take 1 tablet (25 mg) by mouth daily.  Refills: 0     traZODone 50 MG tablet  Commonly known as: DESYREL      Dose: 25 mg  Take 25 mg by mouth at bedtime.  Refills: 0     Tums 500 MG chewable tablet  Generic drug: calcium carbonate      Dose: 1,000 mg  Take 1,000 mg by mouth 3 times daily as needed for heartburn  Refills: 0     ursodiol 300 MG capsule  Commonly known as: ACTIGALL  Used for: Biliary stricture (H)      Dose: 300 mg  Take 1 capsule (300 mg) by mouth 2 times daily  Quantity: 60 capsule  Refills: 0              ASSESSMENT/PLAN  Encounter Diagnoses   Name Primary?     ST elevation myocardial infarction (STEMI), unspecified artery (H) Yes     Pain management      Physical deconditioning      Chronic systolic congestive heart failure (H)        CABG x3 Follow up with cardiology, Pain management Life vest, ASA    Pain management scheduled and as needed Tylenol,  spinal cord stimulator, Gabapentin Voltaren gel twice dailyas needed,     HDL on Atorvastatin    Physical deconditioning  PT/OT    Gout allopurinol, recent colchicine x2 and completed prednisone on 12/8/24 resolved    CHF- daily weights, Bumex Spironolactone     Diabetes type 2 continue glipizide XL 2.5 mg daily, glargine  increased to 12 units subcu twice daily on 12/6/2024    Hypertension Metoprolol Succinate     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 The encounter was in whole, or in part, related to the primary reason for home health. The patient is confined to his/her home and needs intermittent skilled nursing, physical therapy, speech-language pathology, or the continued need for occupational therapy. A plan of care has been established by a physician and is periodically reviewed by a physician.  Date of Face-to-Face Encounter: 12/16/24    I certify that, based on my findings, the following services are medically necessary home health services: PT/OT/HHA/RN    My clinical findings support the need for the above skilled services because: PT OT for continued strength and endurance, home health aide to assist with activities of daily living, RN for vital signs, medication management and did you hear from the people in [place] monitor healing of surgical wounds    This patient is homebound because: He is deconditioned following CABG x 3 and also nonambulatory making him unsafe to leave the home unassisted      The patient is, or has been, under my care and I have initiated the establishment of the plan of care. This patient will be followed by a physician who will periodically review the plan of care.    Schedule follow up visit with primary care provider within 7 days to reestablish care.    Electronically signed by: Darling Valdivia CNP           Sincerely,        Darling Valdivia CNP

## 2024-12-17 ENCOUNTER — OFFICE VISIT (OUTPATIENT)
Dept: CARDIOLOGY | Facility: CLINIC | Age: 79
End: 2024-12-17
Payer: COMMERCIAL

## 2024-12-17 VITALS
HEART RATE: 80 BPM | WEIGHT: 190 LBS | SYSTOLIC BLOOD PRESSURE: 124 MMHG | RESPIRATION RATE: 18 BRPM | HEIGHT: 72 IN | BODY MASS INDEX: 25.73 KG/M2 | DIASTOLIC BLOOD PRESSURE: 64 MMHG

## 2024-12-17 DIAGNOSIS — Z95.1 S/P CABG X 3: Primary | ICD-10-CM

## 2024-12-17 PROCEDURE — 99024 POSTOP FOLLOW-UP VISIT: CPT

## 2024-12-17 NOTE — PROGRESS NOTES
CARDIOTHORACIC SURGERY FOLLOW-UP VISIT     Lance Ibrahim   1945   [id number]      Reason for visit: Post operative clinic visit. Patient underwent coronary artery bypass grafting x 3 with Dr. Evans on 11/18/24.     HPI: Lance Ibrahim is a 79 year old year old male seen in clinic for a routine follow-up appointment after surgery. Patient has past medical history as below. Hospital course was uneventful. Postop echo showed EF 25%, therefore a Lifevest was ordered and place on pt.before discharge to TCU. Lifevest ordered for 3 mos or until heart failure discontinues it Patient was discharged to TCU.    Patient has been doing well since discharge. Patient did follow-up with primary care since leaving the hospital. Reports that incisions are healing well. Denies fevers, peripheral edema. Appetite is improving and patient is voiding without difficulty. Weight has been stable. Pain management at this point with tylenol. Patient has been working with PT/OT and this is going well. His rehab is limited by his osteoarthritis and the need for a knee replacement. Patient is  not on anti-coagulation.     PAST MEDICAL HISTORY:  Past Medical History:   Diagnosis Date    Anemia     Arthritis     osteoarthritis knees    BPH     CAD (coronary artery disease)     subtotal occlusion in the small distal LAD     Cardiomyopathy     Cerebral artery occlusion with cerebral infarction     TIA 1993, no residual    Cervicalgia     CHF (congestive heart failure) (H)     Chronic kidney disease     Chronic low back pain     Chronic rhinitis     Gouty arthropathy     hands    Hyperlipidemia     Hypertension     Kidney stone     Nocturia     Obesity     Osteomyelitis (H) 08/2023    Foot    PVD (peripheral vascular disease)     Sleep apnea     doesn't use cpap    Spinal cord stimulator status 04/2024    Spinal cord stimulator in place    TIA (transient ischaemic attack) 1993    Type 2 diabetes mellitus - 11/08/2018    Ureteral stone        PAST  SURGICAL HISTORY:  Past Surgical History:   Procedure Laterality Date    AMPUTATE FOOT Right 08/07/2023    Procedure: AMPUTATION, right hallux;  Surgeon: Nakul Salazar DPM;  Location: [facility] OR    AMPUTATE TOE(S) Left 10/15/2018    Procedure: AMPUTATE TOE(S);  Left third toe amputation;  Surgeon: Ayaka Azevedo DPM, Podiatry/Foot and Ankle Surgery;  Location: [facility] OR    ARTHROPLASTY KNEE Right 12/07/2018    Procedure: Right total knee arthroplasty;  Surgeon: Issa Cunha MD;  Location: [facility] OR    COLONOSCOPY  03/09/2013    Procedure: COLONOSCOPY;  COLONOSCOPY;  Surgeon: Chau Hogan MD;  Location: [facility]    CORONARY ARTERY BYPASS GRAFT, WITH ENDOSCOPIC VESSEL PROCUREMENT N/A 11/18/2024    Procedure: CORONARY ARTERY BYPASS GRAFT TIMES THREE, WITH LEFT INTERNAL MAMARARY ARTERY HARVEST, LEFT ENDOSCOPIC VESSEL PROCUREMENT, EPIAORTIC ULTRASOUND;  Surgeon: Tsering Evans MD;  Location: [facility] OR    CV CORONARY ANGIOGRAM N/A 11/14/2024    Procedure: Coronary Angiogram;  Surgeon: Talon Lew MD;  Location: [facility] LAB CV    CV HEART CATHETERIZATION WITH POSSIBLE INTERVENTION Left 03/05/2019    Procedure: Coronary Angiogram;  Surgeon: Nas Linda MD;  Location:  HEART CARDIAC CATH LAB    CV INTRA AORTIC BALLOON N/A 11/16/2024    Procedure: Intra aortic Balloon Pump Insertion;  Surgeon: Jessica Yepez MD;  Location: [facility] CV    CV INTRAVASULAR ULTRASOUND N/A 11/14/2024    Procedure: Intravascular Ultrasound;  Surgeon: Talon Lew MD;  Location: [facility] LAB CV    CV LEFT HEART CATH N/A 11/14/2024    Procedure: Left Heart Catheterization;  Surgeon: Talon Lew MD;  Location: [facility] LAB CV    Diastasis recti repair  1985    ENDOSCOPIC RETROGRADE CHOLANGIOPANCREATOGRAM N/A 04/30/2024    Procedure: ENDOSCOPIC RETROGRADE CHOLANGIOPANCREATOGRAPHY, BILIARY SPHINCTEROTOMY, DILATION, BRUSHING, BIOPSIES with DISTAL EXTRA HEPATIC BILE DUCT  STRICTURE;  Surgeon: Aldo Gongora MD;  Location: [facility] OR    ESOPHAGOSCOPY, GASTROSCOPY, DUODENOSCOPY (EGD), COMBINED N/A 04/30/2024    Procedure: ESOPHAGOGASTRODUODENOSCOPY, WITH ENDOSCOPIC ULTRASOUND GUIDANCE;  Surgeon: Aldo Gongora MD;  Location: [facility] OR    EXTRACAPSULAR CATARACT EXTRATION WITH INTRAOCULAR LENS IMPLANT Left 03/13/2017    EXTRACAPSULAR CATARACT EXTRATION WITH INTRAOCULAR LENS IMPLANT Right     FOOT SURGERY  04/2013    cyst removal     HERNIA REPAIR  07/13/2004    ventral     IR DISC ASPIRATION INJECTION  6/19/2024    IRRIGATION AND DEBRIDEMENT SHOULDER, COMBINED Right 8/7/2024    Procedure: IRRIGATION AND DEBRIDEMENT, SHOULDER;  Surgeon: Terence Hanson MD;  Location: [facility] OR    IRRIGATION AND DEBRIDEMENT UPPER EXTREMITY, COMBINED Right 8/7/2024    Procedure: IRRIGATION AND DEBRIDEMENT, ELBOW AND THUMB;  Surgeon: Terence Hanson MD;  Location: [facility] OR    ORIF Shoulder Left     PROSTATE SURGERY  02/2024    Urolift    RESECT CLAVICLE DISTAL Right 8/7/2024    Procedure: EXCISION, CLAVICLE, DISTAL;  Surgeon: Terence Hanson MD;  Location: [facility] OR    SPINAL CORD STIMULATOR IMPLANT  04/03/2024    TRANSESOPHAGEAL ECHOCARDIOGRAM INTRAOPERATIVE N/A 11/18/2024    Procedure: ECHOCARDIOGRAM, TRANSESOPHAGEAL, INTRAOPERATIVE;  Surgeon: Tsering Evans MD;  Location: [facility] OR    Ventral hernia repair NOS  1987       CURRENT MEDICATIONS:     Current Outpatient Medications:     acetaminophen (TYLENOL) 500 MG tablet, Take 1,000 mg by mouth 3 times daily. TID while awake and PRN nightly, Disp: , Rfl:     allopurinol (ZYLOPRIM) 100 MG tablet, Take 100 mg by mouth daily., Disp: , Rfl:     aspirin (ASA) 81 MG chewable tablet, Take 2 tablets (162 mg) by mouth daily., Disp: , Rfl:     atorvastatin (LIPITOR) 40 MG tablet, Take 1 tablet (40 mg) by mouth every evening., Disp: , Rfl:     bumetanide (BUMEX) 1 MG tablet,  "Take 1 tablet (1 mg) by mouth daily., Disp: , Rfl:     calcium carbonate (TUMS) 500 MG chewable tablet, Take 1,000 mg by mouth 3 times daily as needed for heartburn, Disp: , Rfl:     diclofenac (VOLTAREN) 1 % topical gel, Apply 2 g topically 2 times daily as needed for moderate pain., Disp: , Rfl:     Ferrous Gluconate 324 (37.5 Fe) MG TABS, Take 1 tablet by mouth daily., Disp: , Rfl:     finasteride (PROSCAR) 5 MG tablet, Take 5 mg by mouth daily, Disp: , Rfl:     gabapentin (NEURONTIN) 300 MG capsule, Take 1 capsule (300 mg) by mouth 3 times daily as needed., Disp: , Rfl:     glipiZIDE (GLUCOTROL XL) 2.5 MG 24 hr tablet, Take 1 tablet (2.5 mg) by mouth daily., Disp: , Rfl:     insulin glargine (LANTUS PEN) 100 UNIT/ML pen, Inject 10 Units subcutaneously 2 times daily. (Patient taking differently: Inject 12 Units subcutaneously 2 times daily.), Disp: , Rfl:     metoprolol succinate ER (TOPROL XL) 100 MG 24 hr tablet, Take 1 tablet (100 mg) by mouth daily., Disp: , Rfl:     pantoprazole (PROTONIX) 40 MG EC tablet, Take 1 tablet (40 mg) by mouth every morning (before breakfast)., Disp: , Rfl:     sodium bicarbonate 650 MG tablet, Take 3 tablets (1,950 mg) by mouth 3 times daily., Disp: , Rfl:     spironolactone (ALDACTONE) 25 MG tablet, Take 1 tablet (25 mg) by mouth daily., Disp: , Rfl:     traZODone (DESYREL) 50 MG tablet, Take 25 mg by mouth at bedtime., Disp: , Rfl:     ursodiol (ACTIGALL) 300 MG capsule, Take 1 capsule (300 mg) by mouth 2 times daily, Disp: 60 capsule, Rfl: 0    White Petrolatum-Mineral Oil (LUBRIFRESH P.M.) OINT, Apply 1 g to eye 2 times daily as needed for dry eyes., Disp: , Rfl:     ALLERGIES:      Allergies   Allergen Reactions    Ancef [Cefazolin] Rash    Cephalexin Itching and Rash     Allergic reaction required hospitalization 4/2024    Penicillins Anaphylaxis    Other Drug Allergy (See Comments) Unknown    Sulfa Antibiotics      \"itchy rash, swelling of face and hands\"       ROS:  Gen: " No fevers, weight loss/gain  CV: Denies chest pain, peripheral edema  Pulm: Denies SOB  GI/: Voiding without problems, appetite improving.     LABS:  None    IMAGING:  None    PHYSICAL EXAM:   /64 (BP Location: Left arm, Patient Position: Sitting, Cuff Size: Adult Regular)   Pulse 80   Resp 18   Ht 1.829 m (6')   Wt 86.2 kg (190 lb)   BMI 25.77 kg/m    General: Alert and oriented, pleasant, no acute distress.  CV:  No peripheral edema.  Pulm: Easy work of breathing on room air.   GI: Soft, non-tender, and non-distended  Incision: Chest and left leg incisions clean dry and intact without erythema, swelling or drainage  Neuro: CNs grossly intact.      ASSESSMENT/PLAN:  Lance Ibrahim is a 79 year old year old male status post CAB x 3 who returns to clinic for postop visit.     - Surgically doing well. Incisions are healing well with no signs of infection.  - Hemodynamics are stable. No medication changes were needed today.  - Follow up with your cardiologist as scheduled with Dr Amaya 1/16/25 at 9:50 am  - Continue with PT/OT until completed.   - May start driving  (4 weeks post-op) if not using narcotic pain medications.  - Continue strict sternal precautions until 1/15/25. No lifting >10bs; may gradually increase at this point (8 weeks post-op).   - No need for further follow-up with CV surgery unless concerns. Feel free to call our office with questions. [phone]  - Follow up with Heart Failure 1/7/25 at 10:10 am         Annie Sarabia PA-C  [facility] Cardiothoracic Surgery  [place]

## 2024-12-19 DIAGNOSIS — Z09 HOSPITAL DISCHARGE FOLLOW-UP: ICD-10-CM

## 2024-12-23 DIAGNOSIS — E11.51 TYPE 2 DIABETES MELLITUS WITH PERIPHERAL VASCULAR DISEASE (H): ICD-10-CM

## 2024-12-23 NOTE — TELEPHONE ENCOUNTER
Medication Question or Refill      What medication are you calling about (include dose and sig)?: blood glucose monitoring (NO BRAND SPECIFIED) meter device kit   Dexcom G7 monitor     Preferred Pharmacy: [facility] - [place], [place] - [address]       Do you need a refill? Yes, patient states he has moved multiple times and has lost his G7  for his blood glucose monitoring. Please advise and send to pharmacy as appropriate

## 2024-12-30 ENCOUNTER — TELEPHONE (OUTPATIENT)
Dept: FAMILY MEDICINE | Facility: CLINIC | Age: 79
End: 2024-12-30
Payer: COMMERCIAL

## 2024-12-30 DIAGNOSIS — E11.51 TYPE 2 DIABETES MELLITUS WITH PERIPHERAL VASCULAR DISEASE (H): Primary | ICD-10-CM

## 2024-12-30 RX ORDER — ACYCLOVIR 400 MG/1
1 TABLET ORAL
Qty: 9 EACH | Refills: 5 | Status: SHIPPED | OUTPATIENT
Start: 2024-12-30

## 2024-12-30 RX ORDER — ACYCLOVIR 400 MG/1
1 TABLET ORAL ONCE
Qty: 1 EACH | Refills: 0 | Status: SHIPPED | OUTPATIENT
Start: 2024-12-30 | End: 2024-12-30

## 2024-12-30 NOTE — TELEPHONE ENCOUNTER
FYI - Status Update    Who is Calling: Trini RAPP    Update: Trini is calling to update the provider know about patient's blood sugar is 244 today 12/30/2024 PT did not say what time this sugar was taken at or if they did a recheck. Please advise and call Trini back if needed please and thank you.    Does caller want a call/response back: Yes     Could we send this information to you in [facility] or would you prefer to receive a phone call?:   Patient would prefer a phone call   Okay to leave a detailed message?: Yes at Other phone number:  [phone]

## 2024-12-31 ENCOUNTER — TELEPHONE (OUTPATIENT)
Dept: FAMILY MEDICINE | Facility: CLINIC | Age: 79
End: 2024-12-31
Payer: COMMERCIAL

## 2024-12-31 NOTE — TELEPHONE ENCOUNTER
Huddled with Renetta Burch NP (covering provider). Per Renetta Bucrh, if patient is not currently experiencing symptoms, no need to be evaluated in the emergency room at this time. If patient experiences any symptoms of hyperglycemia, patient needs to be evaluated in the emergency room. If no symptoms, patient should follow up in clinic on Thursday or Friday.  No medication changes at this time. Patient should continue diet control for blood sugars.    Called and spoke with Farrah PAULA at [facility] and relayed above provider recommendations. Farrah endorses understanding and denies further questions or concerns. Requests writer call patient with provider recommendations.    Called and spoke with patient. Relayed provider response and recommendations. Patient endorses understanding and agrees with plan. Patient denies any current symptoms of hyperglycemia. Reviewed hyperglycemia symptoms, and instructed patient to seek evaluation in the ER with any symptoms. Patient endorses understanding and agrees with plan. Patient will continue to monitor blood glucose and call with further elevated readings.    Patient reports this reading this morning was the first time his blood glucose has been elevated to this level.  Patient does confirm he has been taking lantus 12 units twice daily 8am and 5pm (holds if glucose under 200) and glipizide 2.5mg ER tablet in the morning.    Patient scheduled for appointment on 1/3/25.    Routing to provider for awareness that patient is taking lantus differently than prescribed.    Brenda Leggett RN  [facility]

## 2024-12-31 NOTE — TELEPHONE ENCOUNTER
Farrah RN with [facility] calling into clinic to report abnormal blood glucose readings.    Home Care RN reports patient's blood sugar is currently 501. Farrah is not with the patient, so unable to triage.  Farrah states the patient is not experiencing any symptoms (including polydipsia, polyphagia or polyuria, and denies dizziness).    Patient is on lantus 12 units twice daily 8am and 5pm   glipizide 2.5mg ER tablet in the morning    Patient took both medications this morning at 0730am.    Home care RN will also fax over recent blood glucose readings for provider review.    Please call home care RN back with provider recommendations. Secure line, ok to leave a detailed message.    Brenda Leggett RN  [facility]

## 2025-01-02 ENCOUNTER — NURSE TRIAGE (OUTPATIENT)
Dept: FAMILY MEDICINE | Facility: CLINIC | Age: 80
End: 2025-01-02
Payer: COMMERCIAL

## 2025-01-02 NOTE — TELEPHONE ENCOUNTER
Huddled with Dr. Hatch, patient did not answer but VM was left per his request, provider agrees that since patient is scheduled tomorrow he can go follow the recommendations in the detailed message or come in tomorrow.     Closing encounter.     Luisito Romano RN  [facility]

## 2025-01-02 NOTE — TELEPHONE ENCOUNTER
Recommendation for ER assessment due to shortness of breath with recent history of CAD status post MI with triple bypass described.

## 2025-01-02 NOTE — TELEPHONE ENCOUNTER
Provider Recommendation Follow Up:   Unable to reach patient/caregiver. Left message to return call to [phone] (To enter phone number for call back directly to clinic/staff member). Upon return call please notify caller of provider's recommendations.    Did leave detailed message per patient request with response from covering provider.     Luisito Romano RN  [facility]

## 2025-01-02 NOTE — TELEPHONE ENCOUNTER
Nurse Triage SBAR    Is this a 2nd Level Triage? YES, LICENSED PRACTITIONER REVIEW IS REQUIRED    Situation: Shortness of breath only when laying down start today after his walk     Background:  ED visit in November for a heart attack, did a triple bypass     Patient is currently with home care RN.     Assessment:    RN reported vitals:  /72  Blood sugar at 1 pm - 268   RR: 20, increase to 26   O2 99  HR 72  Tempt: 98.1    Patient took a walk, then sit, then proceed to lay down and he started to have SOB.   He did it twice and experienced SOB twice.   This is new per pt.     No pain or chest pain.   No dizziness.   No palpation, no fluttering of the heart.   No cold or other symptoms reported.     Patient have an OV tomorrow 1/3/24.     Hx heart attack, triple bypass (November 17th)    [phone] Can leave a VM if he does not answer.     Protocol Recommended Disposition:   Go To Office Now    Recommendation:  Disposition given. Pt have an appointment tomorrow and want to confirm with PCP. He prefer to wait until tomorrow if okay.     Care advise and red flags reviewed. No further questions at this time.     Routed to provider    Does the patient meet one of the following criteria for ADS visit consideration? 16+ years old, with an [facility] PCP     TIP  Providers, please consider if this condition is appropriate for management at one of our Acute and Diagnostic Services sites.     If patient is a good candidate, please use dotphrase <dot>triageresponse and select Refer to ADS to document.     Reason for Disposition   MILD difficulty breathing (e.g., minimal/no SOB at rest, SOB with walking, pulse < 100) of new-onset or worse than normal    Additional Information   Negative: SEVERE difficulty breathing (e.g., struggling for each breath, speaks in single words, pulse > 120)   Negative: Breathing stopped and hasn't returned   Negative: Choking on something   Negative: Bluish (or gray) lips or face   Negative:  "Difficult to awaken or acting confused (e.g., disoriented, slurred speech)   Negative: Passed out (e.g., fainted, lost consciousness, blacked out and was not responding)   Negative: Wheezing started suddenly after medicine, an allergic food, or bee sting   Negative: Stridor (harsh sound while breathing in)   Negative: Slow, shallow and weak breathing   Negative: Sounds like a life-threatening emergency to the triager   Negative: Chest pain   Negative: Wheezing (high pitched whistling sound) and previous asthma attacks or use of asthma medicines   Negative: Breathing difficulty and within 14 days of COVID-19 EXPOSURE (close contact) with someone diagnosed with COVID-19 (e.g., COVID test positive)   Negative: Breathing difficulty and COVID-19 is widespread in the community   Negative: Breathing diffculty and only present when coughing   Negative: Breathing difficulty and only from stuffy nose   Negative: Breathing diffculty and only from stuffy nose or runny nose from common cold   Negative: MODERATE difficulty breathing (e.g., speaks in phrases, SOB even at rest, pulse 100-120) of new-onset or worse than normal   Negative: Oxygen level (e.g., pulse oximetry) 90% or lower   Negative: Wheezing can be heard across the room   Negative: Drooling or spitting out saliva (because can't swallow)   Negative: Any history of prior \"blood clot\" in leg or lungs   Negative: Illness requiring prolonged bedrest in past month (e.g., immobilization, long hospital stay)   Negative: Hip or leg fracture (broken bone) in past month (or had cast on leg or ankle in past month)   Negative: Major surgery in the past month   Negative: Long-distance travel in past month (e.g., car, bus, train, plane; with trip lasting 6 or more hours)   Negative: Cancer treatment in past six months (or has cancer now)   Negative: Extra heartbeats, irregular heart beating, or heart is beating very fast (i.e., 'palpitations')   Negative: Fever > 103 F (39.4 C)   " Negative: Fever > 101 F (38.3 C) and over 60 years of age   Negative: Fever > 100 F (37.8 C) and bedridden (e.g., nursing home patient, stroke, chronic illness, recovering from surgery)   Negative: Fever > 100 F (37.8 C) and diabetes mellitus or weak immune system (e.g., HIV positive, cancer chemo, splenectomy, organ transplant, chronic steroids)   Negative: Periods where breathing stops and then resumes normally and bedridden (e.g., nursing home patient, CVA)   Negative: Pregnant or postpartum (from 0 to 6 weeks after delivery)   Negative: Patient sounds very sick or weak to the triager    Protocols used: Breathing Difficulty-A-[place]    Darren DIA RN

## 2025-01-06 ENCOUNTER — HOSPITAL ENCOUNTER (EMERGENCY)
Facility: HOSPITAL | Age: 80
Discharge: HOME | End: 2025-01-06
Attending: EMERGENCY MEDICINE
Payer: COMMERCIAL

## 2025-01-06 ENCOUNTER — MEDICAL CORRESPONDENCE (OUTPATIENT)
Dept: HEALTH INFORMATION MANAGEMENT | Facility: CLINIC | Age: 80
End: 2025-01-06

## 2025-01-06 ENCOUNTER — APPOINTMENT (OUTPATIENT)
Dept: RADIOLOGY | Facility: HOSPITAL | Age: 80
End: 2025-01-06
Attending: EMERGENCY MEDICINE
Payer: COMMERCIAL

## 2025-01-06 ENCOUNTER — APPOINTMENT (OUTPATIENT)
Dept: CT IMAGING | Facility: HOSPITAL | Age: 80
End: 2025-01-06
Attending: EMERGENCY MEDICINE
Payer: COMMERCIAL

## 2025-01-06 ENCOUNTER — TELEPHONE (OUTPATIENT)
Dept: FAMILY MEDICINE | Facility: CLINIC | Age: 80
End: 2025-01-06

## 2025-01-06 VITALS
HEART RATE: 71 BPM | SYSTOLIC BLOOD PRESSURE: 160 MMHG | RESPIRATION RATE: 21 BRPM | OXYGEN SATURATION: 100 % | DIASTOLIC BLOOD PRESSURE: 80 MMHG

## 2025-01-06 DIAGNOSIS — Z86.79 HISTORY OF HEART FAILURE: ICD-10-CM

## 2025-01-06 DIAGNOSIS — R06.02 SOB (SHORTNESS OF BREATH): ICD-10-CM

## 2025-01-06 LAB
ALBUMIN UR-MCNC: 70 MG/DL
ANION GAP SERPL CALCULATED.3IONS-SCNC: 16 MMOL/L (ref 7–15)
APPEARANCE UR: CLEAR
BASE EXCESS BLDV CALC-SCNC: 2.6 MMOL/L (ref -3–3)
BILIRUB UR QL STRIP: NEGATIVE
BUN SERPL-MCNC: 32.8 MG/DL (ref 8–23)
CALCIUM SERPL-MCNC: 9.4 MG/DL (ref 8.8–10.4)
CHLORIDE SERPL-SCNC: 103 MMOL/L (ref 98–107)
COLOR UR AUTO: ABNORMAL
CREAT SERPL-MCNC: 1.07 MG/DL (ref 0.67–1.17)
D DIMER PPP FEU-MCNC: 1.55 UG/ML FEU (ref 0–0.5)
EGFRCR SERPLBLD CKD-EPI 2021: 70 ML/MIN/1.73M2
ERYTHROCYTE [DISTWIDTH] IN BLOOD BY AUTOMATED COUNT: 17.8 % (ref 10–15)
FLUAV RNA SPEC QL NAA+PROBE: NEGATIVE
FLUBV RNA RESP QL NAA+PROBE: NEGATIVE
GLUCOSE SERPL-MCNC: 249 MG/DL (ref 70–99)
GLUCOSE UR STRIP-MCNC: NEGATIVE MG/DL
HCO3 BLDV-SCNC: 24 MMOL/L (ref 21–28)
HCO3 SERPL-SCNC: 22 MMOL/L (ref 22–29)
HCT VFR BLD AUTO: 34.9 % (ref 40–53)
HGB BLD-MCNC: 10.8 G/DL (ref 13.3–17.7)
HGB UR QL STRIP: NEGATIVE
HYALINE CASTS: 1 /LPF
KETONES UR STRIP-MCNC: NEGATIVE MG/DL
LEUKOCYTE ESTERASE UR QL STRIP: NEGATIVE
MCH RBC QN AUTO: 27.4 PG (ref 26.5–33)
MCHC RBC AUTO-ENTMCNC: 30.9 G/DL (ref 31.5–36.5)
MCV RBC AUTO: 89 FL (ref 78–100)
NITRATE UR QL: NEGATIVE
NT-PROBNP SERPL-MCNC: ABNORMAL PG/ML (ref 0–1800)
O2/TOTAL GAS SETTING VFR VENT: 21 %
OXYHGB MFR BLDV: 48 % (ref 70–75)
PCO2 BLDV: 26 MM HG (ref 40–50)
PH BLDV: 7.57 [PH] (ref 7.32–7.43)
PH UR STRIP: 7.5 [PH] (ref 5–7)
PLATELET # BLD AUTO: 286 10E3/UL (ref 150–450)
PO2 BLDV: 23 MM HG (ref 25–47)
POTASSIUM SERPL-SCNC: 4.7 MMOL/L (ref 3.4–5.3)
RBC # BLD AUTO: 3.94 10E6/UL (ref 4.4–5.9)
RBC URINE: <1 /HPF
RSV RNA SPEC NAA+PROBE: NEGATIVE
SAO2 % BLDV: 48.7 % (ref 70–75)
SARS-COV-2 RNA RESP QL NAA+PROBE: NEGATIVE
SODIUM SERPL-SCNC: 141 MMOL/L (ref 135–145)
SP GR UR STRIP: 1.01 (ref 1–1.03)
TROPONIN T SERPL HS-MCNC: 33 NG/L
UROBILINOGEN UR STRIP-MCNC: <2 MG/DL
WBC # BLD AUTO: 7.5 10E3/UL (ref 4–11)
WBC URINE: 1 /HPF

## 2025-01-06 PROCEDURE — 85041 AUTOMATED RBC COUNT: CPT | Performed by: EMERGENCY MEDICINE

## 2025-01-06 PROCEDURE — 82805 BLOOD GASES W/O2 SATURATION: CPT | Performed by: EMERGENCY MEDICINE

## 2025-01-06 PROCEDURE — 85379 FIBRIN DEGRADATION QUANT: CPT | Performed by: EMERGENCY MEDICINE

## 2025-01-06 PROCEDURE — 84484 ASSAY OF TROPONIN QUANT: CPT | Performed by: EMERGENCY MEDICINE

## 2025-01-06 PROCEDURE — 93005 ELECTROCARDIOGRAM TRACING: CPT | Performed by: EMERGENCY MEDICINE

## 2025-01-06 PROCEDURE — 83880 ASSAY OF NATRIURETIC PEPTIDE: CPT | Performed by: EMERGENCY MEDICINE

## 2025-01-06 PROCEDURE — 80048 BASIC METABOLIC PNL TOTAL CA: CPT | Performed by: EMERGENCY MEDICINE

## 2025-01-06 PROCEDURE — 99285 EMERGENCY DEPT VISIT HI MDM: CPT | Mod: 25

## 2025-01-06 PROCEDURE — 87637 SARSCOV2&INF A&B&RSV AMP PRB: CPT | Performed by: EMERGENCY MEDICINE

## 2025-01-06 PROCEDURE — 71046 X-RAY EXAM CHEST 2 VIEWS: CPT

## 2025-01-06 PROCEDURE — 36415 COLL VENOUS BLD VENIPUNCTURE: CPT | Performed by: EMERGENCY MEDICINE

## 2025-01-06 PROCEDURE — 81001 URINALYSIS AUTO W/SCOPE: CPT | Performed by: EMERGENCY MEDICINE

## 2025-01-06 PROCEDURE — 71275 CT ANGIOGRAPHY CHEST: CPT

## 2025-01-06 PROCEDURE — 250N000011 HC RX IP 250 OP 636: Performed by: EMERGENCY MEDICINE

## 2025-01-06 PROCEDURE — 85014 HEMATOCRIT: CPT | Performed by: EMERGENCY MEDICINE

## 2025-01-06 RX ORDER — IOPAMIDOL 755 MG/ML
90 INJECTION, SOLUTION INTRAVASCULAR ONCE
Status: COMPLETED | OUTPATIENT
Start: 2025-01-06 | End: 2025-01-06

## 2025-01-06 RX ADMIN — IOPAMIDOL 90 ML: 755 INJECTION, SOLUTION INTRAVENOUS at 10:21

## 2025-01-06 ASSESSMENT — ACTIVITIES OF DAILY LIVING (ADL)
ADLS_ACUITY_SCORE: 61

## 2025-01-06 NOTE — PROGRESS NOTES
[facility]  [address], [place] [address]  Office: [phone]     Assessment/Recommendations   Assessment: Mr. Ibrahim presents to [facility] today for post hospitalization heart failure follow-up visit.    # Chronic systolic heart failure/HFrEF (EF 20-25% per echo 11/24/2024)  # Mixed cardiomyopathy   Stage C. NYHA Class II.  His weight on the clinic scale today is 192 lbs. NtproBNP elevated when patient in the ED yesterday. Trending down from most recent hospitalization. Medications are managed by assisted living facility and is unable to complete med rec today. Weight has been stable and no overt sx of excess fluid, patient is not interested in increasing diuretic today. Upon exam, patient is well-compensated.     -Fluid status: near euvolemic; Diuretic: bumex 1 mg, no KCL supp  -ACEi/ARB/ARNi/afterload reduction: lisinopril 10 mg  -BB: metoprolol succinate 100 mg  -Aldosterone antagonist: spironolactone 25 mg   -SGLT2i: deferred while other medication therapy prioritized   -SCD prophylaxis: was prescribed lifevest for EF <30 and high risk for SCD s/p CABG, Pt does not want to continue with lifevest; purpose of device and risks explained, patient verbalized understanding   -NSAID use: contraindicated  -Sleep apnea evaluation: Refused or previously referred    Heart failure education including medication compliance and lifestyle management reviewed today: low sodium diet <2g Na/day, fluid intake <2L/day, daily weight monitoring, and physical activity as tolerated.     # Hypertension  -BP today 136/74  -Continue GDMT as outlined above, uptitrate towards goal as tolerated    # Coronary artery disease  # Dyslipidemia  -Denies chest pain and anginal equivalents  -Antiplatelet therapy with ASA 81 mg for secondary ASCVD prevention  -LDL goal <70; high intensity statin therapy with atorvastatin for secondary ASCVD prevention    # CKD II-IIIa  (eGFR 46-70)  -Baseline Cr labile ~0.9-1.3 w/recurrent episodes of DEAN. Most recent Cr 1.07 (1/7/2025)    # Diabetes Mellitus  -Most recent A1C 9.1 (11/14/2024)   -metformin, glipizide, lantus  -Consider adding jardiance for DM and cardiovascular benefit  -Managed by PCP      Plan:  -Consider starting Jardiance indicated for HF GDMT and also with DM2 benefit- discussed with patient today. Patient would like to further review with Dr. Amaya at upcoming appointment.     Follow up with General Cardiology, Dr. Amaya- scheduled January 16  Follow up in the Heart Failure Clinic with EMELY in 2-3 months      The longitudinal plan of care for the condition(s) below were addressed during this visit. Due to the added complexity in care, I will continue to support Mr. Ibrahim in the subsequent management of this condition(s) and with the ongoing continuity of care of this condition(s): HFrEF     History of Present Illness/Subjective    Mr. Ibrahim is a 79 year old male with a past medical history significant for CAD s/p 3V CABG 11/18, Mixed CM, HFrEF, HT, h/o CVA, CKD3, DM2. Today patient presents to [facility] for post hospitalization heart failure follow-up visit.    Primary Cardiologist: Dr. Amaya.      Hospitalization 11/14-11/26/2024 [facility]. Presented with STEMI. Subsequent coronary angiogram revealed left main disease and totally occluded RCA. Intra-aortic balloon pup was placed and he was deemed candidate for coronary artery revascularization by CV surgery. S/p 3V CABG 11/18. Discharge to TCU 11/26. Patient was discharged with lifevest for EF 25%. Discharge from hospital weight was 193 lbs.    ED [facility] 11/29. Presented with chest pain that resolved with nitroglycerin x1. EKG showed sinus rhythm, no acute ischemic changes. Bedside echo did not show any evidence of pericardial effusion. Troponin elevated at 91, noted he is s/p CABG. CT PE study unremarkable for PE. There was evidence of retrocardiac  "effusion as well as bilateral pleural effusion. The case discussed with patient's cardiothoracic surgeon who was not concerned about the retrocardiac effusion and stated that this is simply related to anticipated postoperative fluid after his chest tubes were removed. Repeat troponin 86, on recheck patient is still pain-free. He was discharged back to TCU.     CV Surgery clinic follow-up visit 12/7. Stable post-op. No changes.     ED [facility] 1/6. Presented with shortness of breath and nasal congestion x1 day. He reported difficulty laying flat. He had lower extremity edema that was present prior to shortness of breath. NtproBNP elevated 15,028. CT chest showed small pleural effusions and interstitial prominence likely mild pulmonary edema. Other findings all stable/reassuring. Noted apneic episodes while sleeping at the hospital with transient oxygen saturations. No interventions and patient was discharged home.     Today, patient denies chest pain, palpitations, lightheadedness/dizziness, shortness of breath, ISBELL, orthopnea, PND, fatigue/activity intolerance, abdominal fullness/bloating. He has chronic LE edema that is unchanged. His activity is limited d/t knee pain/OA. He is able to complete normal daily activities comfortably from his wheelchair. His weight on the clinic scale today is 192 lbs. His most recent home weight was 190 lbs. He lives at assisted living facility and receives help with care and medication management. He reports that he had a sleep study completed ~30 years ago and he was told that he needed surgery for \"fatty uvula\". He has never worn a cpap and does not wish to repeat sleep study at this time. Patient brings with him his lifevest to clinic today. He no longer wants to wear lifevest.       Recent test results & labs below (personally reviewed):    Echocardiogram 11/24/2024  Interpretation Summary  Left ventricular function is decreased. The ejection fraction is 20-25%  (severely " reduced).  There is severe global hypokinesia of the left ventricle.  Moderately decreased right ventricular systolic function  There is mild (1+) aortic regurgitation.  Moderate aortic valve calcification is present.  There is no pericardial effusion.  IVC diameter >2.1 cm collapsing <50% with sniff suggests a high RA pressure  estimated at 15 mmHg or greater.    Coronary Angiogram 11/24/2024    Ost LM to Mid LM lesion is 80% stenosed.    Prox RCA lesion is 100% stenosed.    IVUS was performed on the lesion.   Severe eccentric calcified proximal to mid left main stenosis.  Minimal cross-sectional area by IVUS equals 3.22 mm .  Distal left main reference segment equals 18.3 mm   Codominant circumflex system.  Mild to moderate luminal irregularity but no severe stenoses in LAD or left circumflex.  New  RCA small to medium caliber vessel with total proximal occlusion and bridging collaterals filling distal vessel.  Some competitive flow from left-sided collaterals noted as well.   LVEDP = 27 mmHg  No LV-Ao gradient     Physical Examination Review of Systems   /74 (BP Location: Right arm, Patient Position: Sitting, Cuff Size: Adult Regular)   Pulse 79   Resp 20   Ht 1.829 m (6')   Wt 87.1 kg (192 lb)   BMI 26.04 kg/m    Body mass index is 26.04 kg/m .  Wt Readings from Last 3 Encounters:   01/07/25 87.1 kg (192 lb)   12/17/24 86.2 kg (190 lb)   12/16/24 79.8 kg (175 lb 14.4 oz)     General Appearance:   Appears comfortable, in no acute distress   HEENT: Eyes symmetrical, no discharge or icterus bilaterally. Mucous membranes moist and without lesions   Cardiovascular: RRR, +S1S2, no murmur, rub, or gallop. JVP not distended at 90 degrees/difficult exam d/t limited mobility      Respiratory:   Respirations regular, even, and unlabored. Lungs CTA throughout   GI:  Soft and non distended with normoactive bowel sounds present in all quadrants. No tenderness, rebound, guarding. No masses or hepatomegaly. No  bruits.   Musculoskeletal: No joint swelling or tenderness   Extremities   No cyanosis. mild LE peripheral edema L>R (baseline per pt).   Skin: Warm, no xanthelasma, no jaundice, no rashes or lesions    Neurologic: Alert and oriented X3, no focal deficits   Psychiatric: calm and cooperative                                             Negative unless noted in HPI     Medical History  Surgical History Family History Social History   Past Medical History:   Diagnosis Date    Anemia     Arthritis     osteoarthritis knees    BPH     CAD (coronary artery disease)     subtotal occlusion in the small distal LAD     Cardiomyopathy     Cerebral artery occlusion with cerebral infarction     TIA 1993, no residual    Cervicalgia     CHF (congestive heart failure) (H)     Chronic kidney disease     Chronic low back pain     Chronic rhinitis     Gouty arthropathy     hands    Hyperlipidemia     Hypertension     Kidney stone     Nocturia     Obesity     Osteomyelitis (H) 08/2023    Foot    PVD (peripheral vascular disease)     Sleep apnea     doesn't use cpap    Spinal cord stimulator status 04/2024    Spinal cord stimulator in place    TIA (transient ischaemic attack) 1993    Type 2 diabetes mellitus - 11/08/2018    Ureteral stone     Past Surgical History:   Procedure Laterality Date    AMPUTATE FOOT Right 08/07/2023    Procedure: AMPUTATION, right hallux;  Surgeon: Nakul Salazar DPM;  Location: [facility] OR    AMPUTATE TOE(S) Left 10/15/2018    Procedure: AMPUTATE TOE(S);  Left third toe amputation;  Surgeon: Ayaka Azevedo DPM, Podiatry/Foot and Ankle Surgery;  Location:  OR    ARTHROPLASTY KNEE Right 12/07/2018    Procedure: Right total knee arthroplasty;  Surgeon: Issa Cunha MD;  Location:  OR    COLONOSCOPY  03/09/2013    Procedure: COLONOSCOPY;  COLONOSCOPY;  Surgeon: Chau Hogan MD;  Location:  GI    CORONARY ARTERY BYPASS GRAFT, WITH ENDOSCOPIC VESSEL PROCUREMENT N/A 11/18/2024     Procedure: CORONARY ARTERY BYPASS GRAFT TIMES THREE, WITH LEFT INTERNAL MAMARARY ARTERY HARVEST, LEFT ENDOSCOPIC VESSEL PROCUREMENT, EPIAORTIC ULTRASOUND;  Surgeon: Tsering Evans MD;  Location: [facility] OR    CV CORONARY ANGIOGRAM N/A 11/14/2024    Procedure: Coronary Angiogram;  Surgeon: Talon Lew MD;  Location: [facility] CV    CV HEART CATHETERIZATION WITH POSSIBLE INTERVENTION Left 03/05/2019    Procedure: Coronary Angiogram;  Surgeon: Nas Linda MD;  Location:  HEART CARDIAC CATH LAB    CV INTRA AORTIC BALLOON N/A 11/16/2024    Procedure: Intra aortic Balloon Pump Insertion;  Surgeon: Jessica Yepez MD;  Location: [facility] CV    CV INTRAVASULAR ULTRASOUND N/A 11/14/2024    Procedure: Intravascular Ultrasound;  Surgeon: Talon Lew MD;  Location: [facility] CV    CV LEFT HEART CATH N/A 11/14/2024    Procedure: Left Heart Catheterization;  Surgeon: Talon Lew MD;  Location: [facility] CV    Diastasis recti repair  1985    ENDOSCOPIC RETROGRADE CHOLANGIOPANCREATOGRAM N/A 04/30/2024    Procedure: ENDOSCOPIC RETROGRADE CHOLANGIOPANCREATOGRAPHY, BILIARY SPHINCTEROTOMY, DILATION, BRUSHING, BIOPSIES with DISTAL EXTRA HEPATIC BILE DUCT STRICTURE;  Surgeon: Aldo Gongora MD;  Location: [facility] OR    ESOPHAGOSCOPY, GASTROSCOPY, DUODENOSCOPY (EGD), COMBINED N/A 04/30/2024    Procedure: ESOPHAGOGASTRODUODENOSCOPY, WITH ENDOSCOPIC ULTRASOUND GUIDANCE;  Surgeon: Aldo Gongora MD;  Location: [facility] OR    EXTRACAPSULAR CATARACT EXTRATION WITH INTRAOCULAR LENS IMPLANT Left 03/13/2017    EXTRACAPSULAR CATARACT EXTRATION WITH INTRAOCULAR LENS IMPLANT Right     FOOT SURGERY  04/2013    cyst removal     HERNIA REPAIR  07/13/2004    ventral     IR DISC ASPIRATION INJECTION  6/19/2024    IRRIGATION AND DEBRIDEMENT SHOULDER, COMBINED Right 8/7/2024    Procedure: IRRIGATION AND DEBRIDEMENT, SHOULDER;  Surgeon: Terence Hanson,  MD;  Location: [facility] OR    IRRIGATION AND DEBRIDEMENT UPPER EXTREMITY, COMBINED Right 2024    Procedure: IRRIGATION AND DEBRIDEMENT, ELBOW AND THUMB;  Surgeon: Terence Hanson MD;  Location: [facility] OR    ORIF Shoulder Left     PROSTATE SURGERY  2024    Urolift    RESECT CLAVICLE DISTAL Right 2024    Procedure: EXCISION, CLAVICLE, DISTAL;  Surgeon: Terence Hanson MD;  Location: [facility] OR    SPINAL CORD STIMULATOR IMPLANT  2024    TRANSESOPHAGEAL ECHOCARDIOGRAM INTRAOPERATIVE N/A 2024    Procedure: ECHOCARDIOGRAM, TRANSESOPHAGEAL, INTRAOPERATIVE;  Surgeon: Tsering Evans MD;  Location: [facility] OR    Ventral hernia repair NOS  1987    Family History   Problem Relation Age of Onset    Family History Negative Mother          88 yo    Cancer Father          74 yo brain    Diabetes Maternal Grandfather          91 yo    Alcohol/Drug Paternal Grandfather             Colon Cancer No family hx of     Social History     Socioeconomic History    Marital status:      Spouse name: Not on file    Number of children: Not on file    Years of education: Not on file    Highest education level: Not on file   Occupational History     Employer: SELF   Tobacco Use    Smoking status: Former     Current packs/day: 0.00     Types: Cigarettes     Quit date: 3/17/1993     Years since quittin.8     Passive exposure: Never    Smokeless tobacco: Never   Vaping Use    Vaping status: Never Used   Substance and Sexual Activity    Alcohol use: Not Currently    Drug use: No    Sexual activity: Never   Other Topics Concern    Parent/sibling w/ CABG, MI or angioplasty before 65F 55M? Not Asked   Social History Narrative    Not on file     Social Drivers of Health     Financial Resource Strain: Low Risk  (11/15/2024)    Financial Resource Strain     Within the past 12 months, have you or your family members you live with been  unable to get utilities (heat, electricity) when it was really needed?: No   Food Insecurity: Low Risk  (11/15/2024)    Food Insecurity     Within the past 12 months, did you worry that your food would run out before you got money to buy more?: No     Within the past 12 months, did the food you bought just not last and you didn t have money to get more?: No   Transportation Needs: Low Risk  (11/15/2024)    Transportation Needs     Within the past 12 months, has lack of transportation kept you from medical appointments, getting your medicines, non-medical meetings or appointments, work, or from getting things that you need?: No   Physical Activity: Not on file   Stress: Not on file   Social Connections: Not on file   Interpersonal Safety: Low Risk  (11/14/2024)    Interpersonal Safety     Do you feel physically and emotionally safe where you currently live?: Yes     Within the past 12 months, have you been hit, slapped, kicked or otherwise physically hurt by someone?: No     Within the past 12 months, have you been humiliated or emotionally abused in other ways by your partner or ex-partner?: No   Housing Stability: Low Risk  (11/15/2024)    Housing Stability     Do you have housing? : Yes     Are you worried about losing your housing?: No        Medications  Allergies   Current Outpatient Medications   Medication Sig Dispense Refill    acetaminophen (TYLENOL) 500 MG tablet Take 1,000 mg by mouth 3 times daily. TID while awake and PRN nightly      allopurinol (ZYLOPRIM) 100 MG tablet Take 100 mg by mouth daily.      aspirin (ASA) 81 MG chewable tablet Take 2 tablets (162 mg) by mouth daily.      atorvastatin (LIPITOR) 40 MG tablet Take 1 tablet (40 mg) by mouth every evening.      blood glucose monitoring (NO BRAND SPECIFIED) meter device kit Use to test blood sugar 2 times daily or as directed. 1 kit 0    bumetanide (BUMEX) 1 MG tablet Take 1 tablet (1 mg) by mouth daily.      calcium carbonate (TUMS) 500 MG  chewable tablet Take 1,000 mg by mouth 3 times daily as needed for heartburn      colchicine (COLCRYS) 0.6 MG tablet Take 1 tablet by mouth 2 times daily.      Continuous Glucose Sensor (DEXCOM G7 SENSOR) MISC 1 each every 10 days. Change sensor every 10 days 9 each 5    diclofenac (VOLTAREN) 1 % topical gel Apply 2 g topically 2 times daily as needed for moderate pain.      Ferrous Gluconate 324 (37.5 Fe) MG TABS Take 1 tablet by mouth daily.      finasteride (PROSCAR) 5 MG tablet Take 5 mg by mouth daily      gabapentin (NEURONTIN) 300 MG capsule Take 1 capsule (300 mg) by mouth 3 times daily as needed.      glipiZIDE (GLUCOTROL XL) 2.5 MG 24 hr tablet Take 1 tablet (2.5 mg) by mouth daily.      insulin glargine (LANTUS PEN) 100 UNIT/ML pen Inject 10 Units subcutaneously 2 times daily. (Patient taking differently: Inject 12 Units subcutaneously 2 times daily.)      lisinopril (ZESTRIL) 10 MG tablet Take 1 tablet by mouth daily at 2 pm.      metFORMIN (GLUCOPHAGE XR) 500 MG 24 hr tablet Take 1 tablet (500 mg) by mouth daily (with dinner). 30 tablet 5    metoprolol succinate ER (TOPROL XL) 100 MG 24 hr tablet Take 1 tablet (100 mg) by mouth daily.      pantoprazole (PROTONIX) 40 MG EC tablet Take 1 tablet (40 mg) by mouth every morning (before breakfast).      sodium bicarbonate 650 MG tablet Take 3 tablets (1,950 mg) by mouth 3 times daily.      spironolactone (ALDACTONE) 25 MG tablet Take 1 tablet (25 mg) by mouth daily.      traZODone (DESYREL) 50 MG tablet Take 25 mg by mouth at bedtime.      ursodiol (ACTIGALL) 300 MG capsule Take 1 capsule (300 mg) by mouth 2 times daily 60 capsule 0    White Petrolatum-Mineral Oil (LUBRIFRESH P.M.) OINT Apply 1 g to eye 2 times daily as needed for dry eyes.      Allergies   Allergen Reactions    Ancef [Cefazolin] Rash    Cephalexin Itching and Rash     Allergic reaction required hospitalization 4/2024    Penicillins Anaphylaxis    Other Drug Allergy (See Comments) Unknown     "Sulfa Antibiotics      \"itchy rash, swelling of face and hands\"         Lab Results    Chemistry/lipid CBC Cardiac Enzymes/BNP/TSH/INR   Lab Results   Component Value Date    CHOL 127 2024    HDL 23 (L) 2024    TRIG 80 2024    BUN 32.8 (H) 2025     2025    CO2 22 2025    Lab Results   Component Value Date    WBC 7.5 2025    HGB 10.8 (L) 2025    HCT 34.9 (L) 2025    MCV 89 2025     2025    Lab Results   Component Value Date    TSH 4.28 (H) 2024    INR 1.23 (H) 2024              Tanvi Hinojosa, DNP, APRN, CNP  [facility] and [phone]  Fax: [phone]  Heart Failure Nurses: [phone]  "

## 2025-01-06 NOTE — ED TRIAGE NOTES
Pt arrived via ems from assisted l;[facility]. Pt reportssob and can not laiy flat and normally can. LSC, visibly labored breathing.     Triage Assessment (Adult)       Row Name 01/06/25 0900          Triage Assessment    Airway WDL WDL     Additional Documentation Edema (Group);Breath Sounds (Group)        Respiratory WDL    Respiratory WDL X;rhythm/pattern     Rhythm/Pattern, Respiratory tachypneic;shortness of breath;dyspnea upon exertion;labored        Breath Sounds    Breath Sounds All Fields     All Lung Fields Breath Sounds Anterior:;clear;equal bilaterally        Skin Circulation/Temperature WDL    Skin Circulation/Temperature WDL WDL        Cardiac WDL    Cardiac WDL X  htn        Peripheral/Neurovascular WDL    Peripheral Neurovascular WDL WDL        Edema    Edema dependent;leg, left;leg, right     Leg, Left Edema 2+ (Mild)     Leg, Right Edema 2+ (Mild)        Cognitive/Neuro/Behavioral WDL    Cognitive/Neuro/Behavioral WDL WDL

## 2025-01-06 NOTE — DISCHARGE INSTRUCTIONS
Please keep your appointment with cardiology tomorrow.  You have longstanding signs of heart strain, but your markers today are improved from previous checks.      I recommend talking to your primary care doctor about a sleep study, your oxygen levels may be dropping at night, making you feel more short of breath in the morning.

## 2025-01-06 NOTE — ED PROVIDER NOTES
EMERGENCY DEPARTMENT ENCOUNTER      NAME: Lance Ibrahim  AGE: 79 year old male  YOB: 1945  MRN: [id number]  EVALUATION DATE & TIME: 1/6/2025  8:51 AM    PCP: Rickey Adamson    ED PROVIDER: Darwin Gaytan M.D.      Chief Complaint   Patient presents with    Shortness of Breath         FINAL IMPRESSION:  1. SOB (shortness of breath)    2. History of heart failure          ED COURSE & MEDICAL DECISION MAKING:    Pertinent Labs & Imaging studies reviewed below.  All EKGs below represent my independent interpretation.   ED Course as of 01/07/25 1004   Mon Jan 06, 2025   0904 Patient is a 79-year-old gentleman with history of CAD, CHF, insulin-dependent diabetes presents with sudden onset of shortness of breath this morning when waking up.  On arrival here he has a blood pressure of 160/77 with normal temperature, heart rate and oxygenation, he slightly increased respiratory rate and work of breathing.  He is able to speak in full sentences.  No lower extremity edema on my exam, lung sounds are clear, differential includes pneumonia, bacterial versus viral, PE, flash pulmonary edema, pleural effusion   0905 EKG shows sinus rhythm with a rate of 79.  No acute ischemic ST or T wave morphology.  Normal axis, normal intervals.  Incomplete left bundle branch block.   0906   When compared to prior EKG November 29, 2024, there is no significant change.  Impression: Sinus rhythm with incomplete left bundle branch block   0914 UA with Microscopic reflex to Culture(!)  No uti   0927 Blood gas venous(!)  Respiratory alkalosis   0927 WBC: 7.5   0927 Hemoglobin(!): 10.8  Baseline anemia   0957 N-Terminal Pro BNP Inpatient(!): 15,028  Elevated, but improved from a month ago   0958 Troponin T, High Sensitivity(!): 33  Elevated, but improved from a month   1015 Chest XR,  PA & LAT  Previously seen mild bibasilar pleural fluid and atelectasis and the mild lower lung interstitial edema have decreased. No focal pulmonary  consolidation. No pneumothorax. Sternotomy. CABG clips. Mild generalized cardiac enlargement and   pulmonary venous congestion stable. Spinal stimulating electrodes mid thoracic region. Two cannulated screws left proximal humerus.     1036 CT Chest Pulmonary Embolism w Contrast  1.  Negative for pulmonary emboli.     2.  Small pleural effusions and interstitial prominence likely mild pulmonary edema.     3.  There is a 1.7 cm nodular opacity left lower lobe unchanged since more recent studies but new since 5/16/2024 likely inflammatory or atelectasis.   I discussed reassuring findings with the patient.  He feels much better without any intervention here.  Blood pressure has come down a little bit on its own.  Respiratory rate is completely normal.  He does become a little bit hypoxic when he falls asleep, but once he is awake and alert he returns to normal oxygenation.  I suspect this is due to some underlying obstructing apnea, and encouraged outpatient follow-up for this.  Otherwise he has cardiology follow-up tomorrow in clinic, and all markers today show an improvement from his recent hospitalization, no changes in medications today.  Discharged home.    Additional ED Course timestamps entered by medical scribe:  9:00 AM Met with patient for initial interview and exam.      Medical Decision Making  Obtained supplemental history:Supplemental history obtained?: No  Reviewed external records: External records reviewed?: Documented in chart and Inpatient Record: discharge summary on 12/16/24  Care impacted by chronic illness:Cerebrovascular Disease  Care significantly affected by social determinants of health:N/A  Did you consider but not order tests?: Work up considered but not performed and documented in chart, if applicable  Did you interpret images independently?: Independent interpretation of ECG and images noted in documentation, when applicable.  Consultation discussion with other provider: Phone conversation  with consultants will be documented in the ED Course  Discharge. No recommendations on prescription strength medication(s). I considered admission, but discharged patient after significant clinical improvement.    MIPS Criteria Documentation: CT Pulmonary Angiogram:The patient had an abnormal d-dimer.      MEDICATIONS GIVEN IN THE EMERGENCY:  Medications   iopamidol (ISOVUE-370) solution 90 mL (90 mLs Intravenous $Given 1/6/25 1021)         NEW PRESCRIPTIONS STARTED AT TODAY'S ER VISIT  Discharge Medication List as of 1/6/2025 11:14 AM             =================================================================    HPI    Lance Ibrahim is a 79 year old male who presents to this ED for evaluation of shortness of breath.     The patient arrives to the emergency department with shortness of breath and nasal congestion since this morning. He is unable to take or hold deep breaths and feel constriction in his bilateral chest walls. Denies pain, cough, fever, chills, or body aches. Currently in the emergency department, he has difficulty lying flat and feels better sitting up. Of note, he has edema in bilateral lower extremities, but it has been present prior to the shortness of breath. He does report going to bed feeling fine.     Patient has a medical history of type 2 diabetes where he was insulin dependent until he had a heart attack. He is now on a Lantus pen. He also takes baby aspirin 81 mg once a day.     Per chart review, patient presented to [facility] Emergency Department on 11/29/24 for chest pain. Labs remarkable for hemglobin of 10.9, which is baseline. Creatinine of 11/15/24 up from 1.3 previous. WBC was 16.8, new leukocytosis. Troponin elevated at 91. Imaging CT PE study showed unremarkable for PE. Evidence of retrocardiac effusion as well as bilateral pleural effusion. Patient's cardiothoracic surgeon was not concerned about the retrocardiac effusion and stated that this is  related to anticipated postoperative fluid after his chest tubes were removed. Repeat troponin as 86 and the patient was pain free. Patient discharged with plan for TCU.        VITALS:  BP (!) 160/80   Pulse 71   Resp 21   SpO2 100%     PHYSICAL EXAM    Constitutional: Uncomfortable appearing.  HENT: Atraumatic, mucous membranes moist, nose normal. Neck- Supple, gross ROM intact.   Eyes: Pupils mid-range, conjunctiva without injection, no discharge.   Respiratory: Clear to auscultation bilaterally, no wheezing, speaks full sentences easily. No cough. Mildly increased workup of breathing, slight tachypnea.    Cardiovascular: Normal heart rate, regular rhythm, no murmurs. No LE edema.  GI: Soft, no tenderness to deep palpation in all quadrants, no masses.  Musculoskeletal: Moving all 4 extremities intentionally and without pain. No obvious deformity.  Skin: Warm, dry, no rash.  Neurologic: Alert & oriented x 3, cranial nerves grossly intact.  Psychiatric: Affect normal, cooperative.        I, Charanjit Heck am serving as a scribe to document services personally performed by Dr. Darwin Gaytan based on my observation and the provider's statements to me. I, Darwin Gaytan MD attest that Charanjit Heck is acting in a scribe capacity, has observed my performance of the services and has documented them in accordance with my direction.    Darwin Gaytan M.D.  Emergency Medicine  [facility] EMERGENCY DEPARTMENT  [employer] [facility] [address]  [phone]  Dept: [phone]       Darwin Gaytan MD  01/07/25 1007

## 2025-01-06 NOTE — TELEPHONE ENCOUNTER
"S-(situation):   See ED visit notes, from Dr. Darwin Gaytan, at the  ED, from 1/6/25, regarding SOB and hx of heart failure    See AVS instruction  from [facility] ED on 1/6/25:  \"Please keep your appointment with cardiology tomorrow. You have  longstanding signs of heart strain, but your markers today are improved  from previous checks.  I recommend talking to your primary care doctor about a sleep study,  your oxygen levels may be dropping at night, making you feel more  short of breath in the morning.\"    (Appointment with cardiology tomorrow, 1/7/25, Tuesday, at 11:10 am, at the [facility], with Tanvi Hinojosa CNP)     B-(background):   Hx of CVA, cervicalgia, DM2 with diabetic neuropathy, chronic rhinitis, chronic low back pain, gout, PVD, cardiomyopathy, CAD, systolic CHF, HTN, NSTEMI, TIA, spinal stenosis, CKD3b, BPH, OA, and obesity    A-(assessment):   This morning, patient went to ED for SOB upon awakening, so Farrah stated that the patient was sent to the [facility] ED for evaluation-patient is back at his assisted living at this time    Writer called the patient and left a message to return call.    R-(recommendations):   When the patient calls back, please route him to the RN queue to gather more information about the patient's SOB, for the provider to review, for a possible referral for a sleep study for the patient.    Apple Ribeiro, NISA, BSN  [facility]  "

## 2025-01-06 NOTE — ED NOTES
Bed: [facility]-  Expected date:   Expected time:   Means of arrival:   Comments:  WB- 79 Male- SOB, 180/80 assisted living [address]

## 2025-01-06 NOTE — TELEPHONE ENCOUNTER
"S-(situation):   Patient returning call to clinic     B-(background):   Has follow up visit with cardiology tomorrow     A-(assessment):   Patient states he is not usually having SOB at night  SOB has resolved itself now     R-(recommendations):   Offered follow up visit in clinic to discuss ER visit and possible sleep study with provider   Patient declined stating he does not need a sleep study because it is \"due to a fatty fold of tissue on my uvula, it is physiological and a sleep study will not do anything\", patient also declined ER follow up visit due to multiple upcoming clinic visits.    PASTORA GarciaN, RN  [facility]      "

## 2025-01-07 ENCOUNTER — OFFICE VISIT (OUTPATIENT)
Dept: CARDIOLOGY | Facility: CLINIC | Age: 80
End: 2025-01-07
Attending: INTERNAL MEDICINE
Payer: COMMERCIAL

## 2025-01-07 VITALS
HEART RATE: 79 BPM | DIASTOLIC BLOOD PRESSURE: 74 MMHG | WEIGHT: 192 LBS | HEIGHT: 72 IN | RESPIRATION RATE: 20 BRPM | BODY MASS INDEX: 26.01 KG/M2 | SYSTOLIC BLOOD PRESSURE: 136 MMHG

## 2025-01-07 DIAGNOSIS — I50.22 CHRONIC SYSTOLIC HEART FAILURE (H): ICD-10-CM

## 2025-01-07 DIAGNOSIS — Z95.1 S/P CABG X 3: ICD-10-CM

## 2025-01-07 DIAGNOSIS — Z79.4 TYPE 2 DIABETES MELLITUS WITH OTHER SPECIFIED COMPLICATION, WITH LONG-TERM CURRENT USE OF INSULIN (H): ICD-10-CM

## 2025-01-07 DIAGNOSIS — I10 ESSENTIAL (PRIMARY) HYPERTENSION: ICD-10-CM

## 2025-01-07 DIAGNOSIS — I50.9 ACUTE DECOMPENSATED HEART FAILURE (H): ICD-10-CM

## 2025-01-07 DIAGNOSIS — I25.10 CORONARY ARTERY DISEASE INVOLVING NATIVE HEART, UNSPECIFIED VESSEL OR LESION TYPE, UNSPECIFIED WHETHER ANGINA PRESENT: ICD-10-CM

## 2025-01-07 DIAGNOSIS — N18.31 STAGE 3A CHRONIC KIDNEY DISEASE (H): Primary | ICD-10-CM

## 2025-01-07 DIAGNOSIS — E11.69 TYPE 2 DIABETES MELLITUS WITH OTHER SPECIFIED COMPLICATION, WITH LONG-TERM CURRENT USE OF INSULIN (H): ICD-10-CM

## 2025-01-07 DIAGNOSIS — I25.5 ISCHEMIC CARDIOMYOPATHY: ICD-10-CM

## 2025-01-07 LAB
ATRIAL RATE - MUSE: 79 BPM
DIASTOLIC BLOOD PRESSURE - MUSE: 77 MMHG
INTERPRETATION ECG - MUSE: NORMAL
P AXIS - MUSE: 75 DEGREES
PR INTERVAL - MUSE: 196 MS
QRS DURATION - MUSE: 108 MS
QT - MUSE: 402 MS
QTC - MUSE: 460 MS
R AXIS - MUSE: -19 DEGREES
SYSTOLIC BLOOD PRESSURE - MUSE: 162 MMHG
T AXIS - MUSE: 107 DEGREES
VENTRICULAR RATE- MUSE: 79 BPM

## 2025-01-07 NOTE — PATIENT INSTRUCTIONS
It was a pleasure to see you today at the [facility].     Recommendations:  1. Think about starting medication called Jardiance for heart failure. You can further review with Dr. Amaya at upcoming appointment.   2. Continue to follow a low sodium diet (<2g/day) and maintain adequate fluid intake (~50-60 oz/day).   3. Continue to monitor your weight daily and please call if you gain 2 lbs or more in 1 day OR 5 lbs or more in 1-week.    Follow up with general cardiology, Dr. Amaya- scheduled   Follow up in the heart failure clinic in 2-3 months.      Please call with questions/concerns and/or if you experience new or worsening heart failure symptoms (shortness of breath, weight gain, fluid retention/lower extremity swelling, and/or reduced activity tolerance).    Heart Failure Nurse Line: [phone] (M-F 8a-430p)  24-hour HF Nurse Line: [phone] (weekends, evenings, holidays)      aTnvi Hinojosa CNP  [facility] [place] - [facility] [place]   Clinic and [phone]  Fax: [phone]  Heart Failure Nurses: [phone]

## 2025-01-07 NOTE — LETTER
1/7/2025    Rickey Adamson MD  [address]  [facility] [address]    RE: Lance Jacksonsantos       Dear Colleague,     I had the pleasure of seeing Lance Ibrahim in the [facility].      [facility]  [address], [place] [address]  Office: [phone]     Assessment/Recommendations   Assessment: Mr. Ibrahim presents to [facility] today for post hospitalization heart failure follow-up visit.    # Chronic systolic heart failure/HFrEF (EF 20-25% per echo 11/24/2024)  # Mixed cardiomyopathy   Stage C. NYHA Class II.  His weight on the clinic scale today is 192 lbs. NtproBNP elevated when patient in the ED yesterday. Trending down from most recent hospitalization. Medications are managed by assisted living facility and is unable to complete med rec today. Weight has been stable and no overt sx of excess fluid, patient is not interested in increasing diuretic today. Upon exam, patient is well-compensated.     -Fluid status: near euvolemic; Diuretic: bumex 1 mg, no KCL supp  -ACEi/ARB/ARNi/afterload reduction: lisinopril 10 mg  -BB: metoprolol succinate 100 mg  -Aldosterone antagonist: spironolactone 25 mg   -SGLT2i: deferred while other medication therapy prioritized   -SCD prophylaxis: was prescribed lifevest for EF <30 and high risk for SCD s/p CABG, Pt does not want to continue with lifevest; purpose of device and risks explained, patient verbalized understanding   -NSAID use: contraindicated  -Sleep apnea evaluation: Refused or previously referred    Heart failure education including medication compliance and lifestyle management reviewed today: low sodium diet <2g Na/day, fluid intake <2L/day, daily weight monitoring, and physical activity as tolerated.     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 Most recent Cr 1.07 (1/7/2025)    # Diabetes Mellitus  -Most recent A1C 9.1 (11/14/2024)   -metformin, glipizide, lantus  -Consider adding jardiance for DM and cardiovascular benefit  -Managed by PCP      Plan:  -Consider starting Jardiance indicated for HF GDMT and also with DM2 benefit- discussed with patient today. Patient would like to further review with Dr. Amaya at upcoming appointment.     Follow up with General Cardiology, Dr. Amaya- scheduled January 16  Follow up in the Heart Failure Clinic with EMELY in 2-3 months      The longitudinal plan of care for the condition(s) below were addressed during this visit. Due to the added complexity in care, I will continue to support Mr. Ibrahim in the subsequent management of this condition(s) and with the ongoing continuity of care of this condition(s): HFrEF     History of Present Illness/Subjective    Mr. Ibrahim is a 79 year old male with a past medical history significant for CAD s/p 3V CABG 11/18, Mixed CM, HFrEF, HT, h/o CVA, CKD3, DM2. Today patient presents to [facility] for post hospitalization heart failure follow-up visit.    Primary Cardiologist: Dr. Amaya.      Hospitalization 11/14-11/26/2024 [facility]. Presented with STEMI. Subsequent coronary angiogram revealed left main disease and totally occluded RCA. Intra-aortic balloon pup was placed and he was deemed candidate for coronary artery revascularization by CV surgery. S/p 3V CABG 11/18. Discharge to TCU 11/26. Patient was discharged with lifevest for EF 25%. Discharge from hospital weight was 193 lbs.    ED [facility] 11/29. Presented with chest pain that resolved with nitroglycerin x1. EKG showed sinus rhythm, no  "acute ischemic changes. Bedside echo did not show any evidence of pericardial effusion. Troponin elevated at 91, noted he is s/p CABG. CT PE study unremarkable for PE. There was evidence of retrocardiac effusion as well as bilateral pleural effusion. The case discussed with patient's cardiothoracic surgeon who was not concerned about the retrocardiac effusion and stated that this is simply related to anticipated postoperative fluid after his chest tubes were removed. Repeat troponin 86, on recheck patient is still pain-free. He was discharged back to TCU.     CV Surgery clinic follow-up visit 12/7. Stable post-op. No changes.     ED [facility] 1/6. Presented with shortness of breath and nasal congestion x1 day. He reported difficulty laying flat. He had lower extremity edema that was present prior to shortness of breath. NtproBNP elevated 15,028. CT chest showed small pleural effusions and interstitial prominence likely mild pulmonary edema. Other findings all stable/reassuring. Noted apneic episodes while sleeping at the hospital with transient oxygen saturations. No interventions and patient was discharged home.     Today, patient denies chest pain, palpitations, lightheadedness/dizziness, shortness of breath, ISBELL, orthopnea, PND, fatigue/activity intolerance, abdominal fullness/bloating. He has chronic LE edema that is unchanged. His activity is limited d/t knee pain/OA. He is able to complete normal daily activities comfortably from his wheelchair. His weight on the clinic scale today is 192 lbs. His most recent home weight was 190 lbs. He lives at assisted living facility and receives help with care and medication management. He reports that he had a sleep study completed ~30 years ago and he was told that he needed surgery for \"fatty uvula\". He has never worn a cpap and does not wish to repeat sleep study at this time. Patient brings with him his lifevest to clinic today. He no longer wants to wear lifevest. "       Recent test results & labs below (personally reviewed):    Echocardiogram 11/24/2024  Interpretation Summary  Left ventricular function is decreased. The ejection fraction is 20-25%  (severely reduced).  There is severe global hypokinesia of the left ventricle.  Moderately decreased right ventricular systolic function  There is mild (1+) aortic regurgitation.  Moderate aortic valve calcification is present.  There is no pericardial effusion.  IVC diameter >2.1 cm collapsing <50% with sniff suggests a high RA pressure  estimated at 15 mmHg or greater.    Coronary Angiogram 11/24/2024     Ost LM to Mid LM lesion is 80% stenosed.     Prox RCA lesion is 100% stenosed.     IVUS was performed on the lesion.   Severe eccentric calcified proximal to mid left main stenosis.  Minimal cross-sectional area by IVUS equals 3.22 mm .  Distal left main reference segment equals 18.3 mm   Codominant circumflex system.  Mild to moderate luminal irregularity but no severe stenoses in LAD or left circumflex.  New  RCA small to medium caliber vessel with total proximal occlusion and bridging collaterals filling distal vessel.  Some competitive flow from left-sided collaterals noted as well.   LVEDP = 27 mmHg  No LV-Ao gradient     Physical Examination Review of Systems   /74 (BP Location: Right arm, Patient Position: Sitting, Cuff Size: Adult Regular)   Pulse 79   Resp 20   Ht 1.829 m (6')   Wt 87.1 kg (192 lb)   BMI 26.04 kg/m    Body mass index is 26.04 kg/m .  Wt Readings from Last 3 Encounters:   01/07/25 87.1 kg (192 lb)   12/17/24 86.2 kg (190 lb)   12/16/24 79.8 kg (175 lb 14.4 oz)     General Appearance:   Appears comfortable, in no acute distress   HEENT: Eyes symmetrical, no discharge or icterus bilaterally. Mucous membranes moist and without lesions   Cardiovascular: RRR, +S1S2, no murmur, rub, or gallop. JVP not distended at 90 degrees/difficult exam d/t limited mobility      Respiratory:   Respirations  regular, even, and unlabored. Lungs CTA throughout   GI:  Soft and non distended with normoactive bowel sounds present in all quadrants. No tenderness, rebound, guarding. No masses or hepatomegaly. No bruits.   Musculoskeletal: No joint swelling or tenderness   Extremities   No cyanosis. mild LE peripheral edema L>R (baseline per pt).   Skin: Warm, no xanthelasma, no jaundice, no rashes or lesions    Neurologic: Alert and oriented X3, no focal deficits   Psychiatric: calm and cooperative                                             Negative unless noted in HPI     Medical History  Surgical History Family History Social History   Past Medical History:   Diagnosis Date     Anemia      Arthritis     osteoarthritis knees     BPH      CAD (coronary artery disease)     subtotal occlusion in the small distal LAD      Cardiomyopathy      Cerebral artery occlusion with cerebral infarction     TIA 1993, no residual     Cervicalgia      CHF (congestive heart failure) (H)      Chronic kidney disease      Chronic low back pain      Chronic rhinitis      Gouty arthropathy     hands     Hyperlipidemia      Hypertension      Kidney stone      Nocturia      Obesity      Osteomyelitis (H) 08/2023    Foot     PVD (peripheral vascular disease)      Sleep apnea     doesn't use cpap     Spinal cord stimulator status 04/2024    Spinal cord stimulator in place     TIA (transient ischaemic attack) 1993     Type 2 diabetes mellitus - 11/08/2018     Ureteral stone     Past Surgical History:   Procedure Laterality Date     AMPUTATE FOOT Right 08/07/2023    Procedure: AMPUTATION, right hallux;  Surgeon: Nakul Salazar DPM;  Location: [facility] OR     AMPUTATE TOE(S) Left 10/15/2018    Procedure: AMPUTATE TOE(S);  Left third toe amputation;  Surgeon: Aykaa Azevedo DPM, Podiatry/Foot and Ankle Surgery;  Location: [facility] OR     ARTHROPLASTY KNEE Right 12/07/2018    Procedure: Right total knee arthroplasty;  Surgeon: Issa Cunha,  MD;  Location: [facility] OR     COLONOSCOPY  03/09/2013    Procedure: COLONOSCOPY;  COLONOSCOPY;  Surgeon: Chau Hogan MD;  Location: [facility]     CORONARY ARTERY BYPASS GRAFT, WITH ENDOSCOPIC VESSEL PROCUREMENT N/A 11/18/2024    Procedure: CORONARY ARTERY BYPASS GRAFT TIMES THREE, WITH LEFT INTERNAL MAMARARY ARTERY HARVEST, LEFT ENDOSCOPIC VESSEL PROCUREMENT, EPIAORTIC ULTRASOUND;  Surgeon: Tsering Evans MD;  Location: [facility] OR     CV CORONARY ANGIOGRAM N/A 11/14/2024    Procedure: Coronary Angiogram;  Surgeon: Talon Lew MD;  Location: [facility] LAB CV     CV HEART CATHETERIZATION WITH POSSIBLE INTERVENTION Left 03/05/2019    Procedure: Coronary Angiogram;  Surgeon: Nas Linda MD;  Location:  HEART CARDIAC CATH LAB     CV INTRA AORTIC BALLOON N/A 11/16/2024    Procedure: Intra aortic Balloon Pump Insertion;  Surgeon: Jessica Yepez MD;  Location: [facility] LAB CV     CV INTRAVASULAR ULTRASOUND N/A 11/14/2024    Procedure: Intravascular Ultrasound;  Surgeon: Talon Lew MD;  Location: [facility] LAB CV     CV LEFT HEART CATH N/A 11/14/2024    Procedure: Left Heart Catheterization;  Surgeon: Talon Lew MD;  Location: [facility] LAB CV     Diastasis recti repair  1985     ENDOSCOPIC RETROGRADE CHOLANGIOPANCREATOGRAM N/A 04/30/2024    Procedure: ENDOSCOPIC RETROGRADE CHOLANGIOPANCREATOGRAPHY, BILIARY SPHINCTEROTOMY, DILATION, BRUSHING, BIOPSIES with DISTAL EXTRA HEPATIC BILE DUCT STRICTURE;  Surgeon: Aldo Gongora MD;  Location: [facility] OR     ESOPHAGOSCOPY, GASTROSCOPY, DUODENOSCOPY (EGD), COMBINED N/A 04/30/2024    Procedure: ESOPHAGOGASTRODUODENOSCOPY, WITH ENDOSCOPIC ULTRASOUND GUIDANCE;  Surgeon: Aldo Gongora MD;  Location: [facility] OR     EXTRACAPSULAR CATARACT EXTRATION WITH INTRAOCULAR LENS IMPLANT Left 03/13/2017     EXTRACAPSULAR CATARACT EXTRATION WITH INTRAOCULAR LENS IMPLANT Right      FOOT SURGERY  04/2013    cyst removal       HERNIA REPAIR  2004    ventral      IR DISC ASPIRATION INJECTION  2024     IRRIGATION AND DEBRIDEMENT SHOULDER, COMBINED Right 2024    Procedure: IRRIGATION AND DEBRIDEMENT, SHOULDER;  Surgeon: Terence Hanson MD;  Location: [facility] OR     IRRIGATION AND DEBRIDEMENT UPPER EXTREMITY, COMBINED Right 2024    Procedure: IRRIGATION AND DEBRIDEMENT, ELBOW AND THUMB;  Surgeon: Terence Hanson MD;  Location: [facility] OR     ORIF Shoulder Left      PROSTATE SURGERY  2024    Urolift     RESECT CLAVICLE DISTAL Right 2024    Procedure: EXCISION, CLAVICLE, DISTAL;  Surgeon: Terence Hanson MD;  Location: [facility] OR     SPINAL CORD STIMULATOR IMPLANT  2024     TRANSESOPHAGEAL ECHOCARDIOGRAM INTRAOPERATIVE N/A 2024    Procedure: ECHOCARDIOGRAM, TRANSESOPHAGEAL, INTRAOPERATIVE;  Surgeon: Tsering Evans MD;  Location: [facility] OR     Ventral hernia repair NOS  1987    Family History   Problem Relation Age of Onset     Family History Negative Mother          88 yo     Cancer Father          74 yo brain     Diabetes Maternal Grandfather          91 yo     Alcohol/Drug Paternal Grandfather              Colon Cancer No family hx of     Social History     Socioeconomic History     Marital status:      Spouse name: Not on file     Number of children: Not on file     Years of education: Not on file     Highest education level: Not on file   Occupational History     Employer: SELF   Tobacco Use     Smoking status: Former     Current packs/day: 0.00     Types: Cigarettes     Quit date: 3/17/1993     Years since quittin.8     Passive exposure: Never     Smokeless tobacco: Never   Vaping Use     Vaping status: Never Used   Substance and Sexual Activity     Alcohol use: Not Currently     Drug use: No     Sexual activity: Never   Other Topics Concern     Parent/sibling w/ CABG, MI or angioplasty  before 65F 55M? Not Asked   Social History Narrative     Not on file     Social Drivers of Health     Financial Resource Strain: Low Risk  (11/15/2024)    Financial Resource Strain      Within the past 12 months, have you or your family members you live with been unable to get utilities (heat, electricity) when it was really needed?: No   Food Insecurity: Low Risk  (11/15/2024)    Food Insecurity      Within the past 12 months, did you worry that your food would run out before you got money to buy more?: No      Within the past 12 months, did the food you bought just not last and you didn t have money to get more?: No   Transportation Needs: Low Risk  (11/15/2024)    Transportation Needs      Within the past 12 months, has lack of transportation kept you from medical appointments, getting your medicines, non-medical meetings or appointments, work, or from getting things that you need?: No   Physical Activity: Not on file   Stress: Not on file   Social Connections: Not on file   Interpersonal Safety: Low Risk  (11/14/2024)    Interpersonal Safety      Do you feel physically and emotionally safe where you currently live?: Yes      Within the past 12 months, have you been hit, slapped, kicked or otherwise physically hurt by someone?: No      Within the past 12 months, have you been humiliated or emotionally abused in other ways by your partner or ex-partner?: No   Housing Stability: Low Risk  (11/15/2024)    Housing Stability      Do you have housing? : Yes      Are you worried about losing your housing?: No        Medications  Allergies   Current Outpatient Medications   Medication Sig Dispense Refill     acetaminophen (TYLENOL) 500 MG tablet Take 1,000 mg by mouth 3 times daily. TID while awake and PRN nightly       allopurinol (ZYLOPRIM) 100 MG tablet Take 100 mg by mouth daily.       aspirin (ASA) 81 MG chewable tablet Take 2 tablets (162 mg) by mouth daily.       atorvastatin (LIPITOR) 40 MG tablet Take 1 tablet  (40 mg) by mouth every evening.       blood glucose monitoring (NO BRAND SPECIFIED) meter device kit Use to test blood sugar 2 times daily or as directed. 1 kit 0     bumetanide (BUMEX) 1 MG tablet Take 1 tablet (1 mg) by mouth daily.       calcium carbonate (TUMS) 500 MG chewable tablet Take 1,000 mg by mouth 3 times daily as needed for heartburn       colchicine (COLCRYS) 0.6 MG tablet Take 1 tablet by mouth 2 times daily.       Continuous Glucose Sensor (DEXCOM G7 SENSOR) MISC 1 each every 10 days. Change sensor every 10 days 9 each 5     diclofenac (VOLTAREN) 1 % topical gel Apply 2 g topically 2 times daily as needed for moderate pain.       Ferrous Gluconate 324 (37.5 Fe) MG TABS Take 1 tablet by mouth daily.       finasteride (PROSCAR) 5 MG tablet Take 5 mg by mouth daily       gabapentin (NEURONTIN) 300 MG capsule Take 1 capsule (300 mg) by mouth 3 times daily as needed.       glipiZIDE (GLUCOTROL XL) 2.5 MG 24 hr tablet Take 1 tablet (2.5 mg) by mouth daily.       insulin glargine (LANTUS PEN) 100 UNIT/ML pen Inject 10 Units subcutaneously 2 times daily. (Patient taking differently: Inject 12 Units subcutaneously 2 times daily.)       lisinopril (ZESTRIL) 10 MG tablet Take 1 tablet by mouth daily at 2 pm.       metFORMIN (GLUCOPHAGE XR) 500 MG 24 hr tablet Take 1 tablet (500 mg) by mouth daily (with dinner). 30 tablet 5     metoprolol succinate ER (TOPROL XL) 100 MG 24 hr tablet Take 1 tablet (100 mg) by mouth daily.       pantoprazole (PROTONIX) 40 MG EC tablet Take 1 tablet (40 mg) by mouth every morning (before breakfast).       sodium bicarbonate 650 MG tablet Take 3 tablets (1,950 mg) by mouth 3 times daily.       spironolactone (ALDACTONE) 25 MG tablet Take 1 tablet (25 mg) by mouth daily.       traZODone (DESYREL) 50 MG tablet Take 25 mg by mouth at bedtime.       ursodiol (ACTIGALL) 300 MG capsule Take 1 capsule (300 mg) by mouth 2 times daily 60 capsule 0     White Petrolatum-Mineral Oil  "(LUBRIFRESH P.M.) OINT Apply 1 g to eye 2 times daily as needed for dry eyes.      Allergies   Allergen Reactions     Ancef [Cefazolin] Rash     Cephalexin Itching and Rash     Allergic reaction required hospitalization 2024     Penicillins Anaphylaxis     Other Drug Allergy (See Comments) Unknown     Sulfa Antibiotics      \"itchy rash, swelling of face and hands\"         Lab Results    Chemistry/lipid CBC Cardiac Enzymes/BNP/TSH/INR   Lab Results   Component Value Date    CHOL 127 2024    HDL 23 (L) 2024    TRIG 80 2024    BUN 32.8 (H) 2025     2025    CO2 22 2025    Lab Results   Component Value Date    WBC 7.5 2025    HGB 10.8 (L) 2025    HCT 34.9 (L) 2025    MCV 89 2025     2025    Lab Results   Component Value Date    TSH 4.28 (H) 2024    INR 1.23 (H) 2024              Tanvi Hinojosa, DNP, APRN, CNP  [facility] [place]   Clinic and [phone]  Fax: [phone]  Heart Failure Nurses: [phone]      Thank you for allowing me to participate in the care of your patient.      Sincerely,     EDDIE [facility]  cc:   Payam Brooks MD  [address]  [place] [address]      "

## 2025-01-08 ENCOUNTER — TELEPHONE (OUTPATIENT)
Dept: FAMILY MEDICINE | Facility: CLINIC | Age: 80
End: 2025-01-08
Payer: COMMERCIAL

## 2025-01-08 ENCOUNTER — PATIENT OUTREACH (OUTPATIENT)
Dept: CARE COORDINATION | Facility: CLINIC | Age: 80
End: 2025-01-08
Payer: COMMERCIAL

## 2025-01-08 DIAGNOSIS — I10 PRIMARY HYPERTENSION: Primary | ICD-10-CM

## 2025-01-08 DIAGNOSIS — Z95.1 S/P CABG X 3: ICD-10-CM

## 2025-01-08 NOTE — TELEPHONE ENCOUNTER
FYI - Status Update    Who is Calling: Trini RAPP [facility]     Update: Trini from [facility] PT is calling to update the provider about patient vitals and blood sugars. Patient's BP is 156/82 and his sugar before lunch was 347 and rechecked after lunch was 283. Please advise and call Trini if needed please and thank you.    Does caller want a call/response back: Yes     Could we send this information to you in Viroblock or would you prefer to receive a phone call?:   Patient would prefer a phone call   Okay to leave a detailed message?: Yes at Other phone number:  [phone]

## 2025-01-08 NOTE — TELEPHONE ENCOUNTER
Called patient to discuss his BP and BS.     Patient stated that his BS had gone down more and was 210 before dinner and he has been taking all his meds as ordered and no missed doses.    BS have been elevated in the 200's for a week or so but has not experienced any symptoms of Hyperglycemia.     Patients BP has also been elevated lately in the 140's and 150's systolic but also not experiencing any symptoms with that either. Patient stated that his meds are delivered every day and not missing any doses.     Luisito Romano RN  [facility]

## 2025-01-08 NOTE — TELEPHONE ENCOUNTER
Please find out more information about blood pressure and blood sugar - has they been persistently elevated?

## 2025-01-08 NOTE — PROGRESS NOTES
[facility]    Background: Primary Care-Care Coordination initial outreach identified per system criteria and reviewed by [facility] Resource [place] team.    Assessment: Upon chart review, [facility] Team member will not proceed with patient outreach related to this episode of Primary Care-Care Coordination program due to reason below:    Patient has discharged to a Memory Care, Long-term Care, Assisted Living or Group Home where patient is receiving on-site support with their daily cares, including support with ED follow up plan.    Plan: Primary Care-Care Coordination episode addressed appropriately per reason noted above.      MAGALY Mcintyre  [facility]    *Connected Care Resource Team does NOT follow patient ongoing. Referrals are identified based on internal discharge reports and the outreach is to ensure patient has an understanding of their discharge instructions.

## 2025-01-09 ENCOUNTER — TELEPHONE (OUTPATIENT)
Dept: FAMILY MEDICINE | Facility: CLINIC | Age: 80
End: 2025-01-09
Payer: COMMERCIAL

## 2025-01-09 DIAGNOSIS — J30.9 ALLERGIC RHINITIS, UNSPECIFIED SEASONALITY, UNSPECIFIED TRIGGER: Primary | ICD-10-CM

## 2025-01-09 DIAGNOSIS — J30.9 ALLERGIC RHINITIS, UNSPECIFIED SEASONALITY, UNSPECIFIED TRIGGER: ICD-10-CM

## 2025-01-09 RX ORDER — FLUTICASONE PROPIONATE 50 MCG
1 SPRAY, SUSPENSION (ML) NASAL DAILY
Qty: 9.9 G | Refills: 1 | Status: SHIPPED | OUTPATIENT
Start: 2025-01-09

## 2025-01-09 RX ORDER — LISINOPRIL 20 MG/1
20 TABLET ORAL DAILY
Qty: 90 TABLET | Refills: 3 | Status: SHIPPED | OUTPATIENT
Start: 2025-01-09

## 2025-01-09 RX ORDER — FLUTICASONE PROPIONATE 50 MCG
1 SPRAY, SUSPENSION (ML) NASAL DAILY
Qty: 9.9 G | Refills: 1 | Status: SHIPPED | OUTPATIENT
Start: 2025-01-09 | End: 2025-01-09

## 2025-01-09 NOTE — TELEPHONE ENCOUNTER
Medication Question or Refill        What medication are you calling about (include dose and sig)?: Lisinopril 10 MG    Preferred Pharmacy:       Controlled Substance Agreement on file:   CSA -- Patient Level:    CSA: None found at the patient level.       Who prescribed the medication?: Rickey Adamson    Do you need a refill? Yes    When did you use the medication last?     Patient offered an appointment? No    Do you have any questions or concerns?  Yes: Pharmacy called and stated that the patient is no longer at the assisted living he was at and that they need this medication to be sent else where but not sure what pharmacy to sent it to. Please advise and call pharmacy or patient back if needing more information on where to send the medication please and thank you.      Could we send this information to you in [employer]otic[employer] or would you prefer to receive a phone call?:   Patient would prefer a phone call   Okay to leave a detailed message?: No at Other phone number:  [phone]

## 2025-01-09 NOTE — TELEPHONE ENCOUNTER
Called [facility], spoke with Pharmacist Snow. Snow reports the patient was previously at the [facility] and was discharged. The pharmacy was notified on 1/2 that patient was discharged and have not requested any medications since discharge.    Called [facility] in [place]. Spoke with pharmacy buzz Tamez. Confirmed that Pamela Vijay received the new prescription for lisinopril 20mg (dose updated by Dr. Ling today). Dashawn reports the prescription was received, and that this pharmacy does fill medications for [facility] where the patient currently resides.    No further needs at this time.    Brenda Leggett RN  [facility]

## 2025-01-09 NOTE — TELEPHONE ENCOUNTER
Medication list updated.  Please print and sign.  Also recommend since patient declines recommended follow-up visit that blood pressure and blood sugar are both reported in 1 week's time.

## 2025-01-09 NOTE — TELEPHONE ENCOUNTER
I recommend he increase his Lantus insulin to 14 units twice daily.    I recommend he increase lisinopril to 20 mg daily.    I recommend a follow-up office visit be scheduled with primary care to recheck blood pressure, review blood sugars and recheck labs with medication changes within the next 2 weeks.

## 2025-01-09 NOTE — TELEPHONE ENCOUNTER
Please confirm the following -make sure he is taking Lantus insulin 12 units twice daily.  Make sure he is taking lisinopril 10 mg daily.  Based on the confirmation of the dosing of these 2 medications I will make recommendations regarding changes to help improve blood sugar and blood pressure.

## 2025-01-09 NOTE — TELEPHONE ENCOUNTER
Called and spoke with patient per provider request to clarify medication dosages.    Patient confirms he takes Lantus 12 units twice daily, not missing any doses of this medication (managed by assisted living facility).    Patient reports all of his medications are delivered together, and the Lisinopril doesn't have dosing listed on packaging. Medications are managed and provided by the assisted living facility. Patient reports he is receiving all his medications daily. Current medication list states lisinopril 10mg daily.    Called [facility], spoke with Esther, no RN available to verify medication dosage at time of call.  Left message for RN to return call to clinic.    When Val Harmon RN returns call, please route to RN to verify dosage of lisinopril.    Routed to provider to advise based on available information.    Brenda Leggett RN  [facility]

## 2025-01-09 NOTE — TELEPHONE ENCOUNTER
Medication list printed and signed by Dr. Villagran. Faxed to [facility] at [phone]. No RN staff available to discuss medication changes, but detailed message was left during prior call for director of nursing with medication changes.    Called and spoke with patient. Relayed provider recommendations to report BP and BG in 1 weeks time. Patient states assisted living facility checks his BG and BP 3x/day and typically call with any elevated results. Patient will call in 1 week with average blood sugar and blood pressure readings.  Denies further questions or concerns at this time.    Brenda Leggett RN  [facility]

## 2025-01-09 NOTE — TELEPHONE ENCOUNTER
Forms/Letter Request    Type of form/letter: Home Health Certification and Plan Of Care     Do we have the form/letter: Yes: On provider's desk to be completed.    Who is the form from? Home care    Where did/will the form come from? form was faxed in    When is form/letter needed by: When it is completed by the provider.    How would you like the form/letter returned: Fax : [phone]    Patient Notified form requests are processed in 5-7 business days:Yes    Order details/form description: being able to stand and walk     Order number (if applicable): [id number]

## 2025-01-09 NOTE — TELEPHONE ENCOUNTER
Called and spoke with patient. Relayed provider response and recommendations. Patient endorses understanding and agrees with medication changes. Patient requests all medication changes be communicated with RN staff at [facility] as they manage all patient medications. Patient gave verbal consent to communicate with [facility] staff.  Advised patient of recommended follow up within the next 2 weeks. Patient requests to wait until the first week in February to follow up with . Scheduled for office visit on 2/4/25 with PCP.    Called [facility]. No RN available. Left detailed voicemail with medication changes on secure voicemail for Lamonte Gallardo, director of nursing. Advised to call back to clinic with further questions or concerns.    Brenda Leggett RN  [facility]

## 2025-01-13 ENCOUNTER — TELEPHONE (OUTPATIENT)
Dept: FAMILY MEDICINE | Facility: CLINIC | Age: 80
End: 2025-01-13

## 2025-01-13 ENCOUNTER — MEDICAL CORRESPONDENCE (OUTPATIENT)
Dept: HEALTH INFORMATION MANAGEMENT | Facility: CLINIC | Age: 80
End: 2025-01-13

## 2025-01-13 ENCOUNTER — VIRTUAL VISIT (OUTPATIENT)
Dept: FAMILY MEDICINE | Facility: CLINIC | Age: 80
End: 2025-01-13
Payer: COMMERCIAL

## 2025-01-13 ENCOUNTER — E-VISIT (OUTPATIENT)
Dept: FAMILY MEDICINE | Facility: CLINIC | Age: 80
End: 2025-01-13
Payer: COMMERCIAL

## 2025-01-13 DIAGNOSIS — R39.15 URINARY URGENCY: ICD-10-CM

## 2025-01-13 DIAGNOSIS — R35.0 URINARY FREQUENCY: Primary | ICD-10-CM

## 2025-01-13 PROCEDURE — 98006 SYNCH AUDIO-VIDEO EST MOD 30: CPT | Performed by: NURSE PRACTITIONER

## 2025-01-13 PROCEDURE — 99207 PR NON-BILLABLE SERV PER CHARTING: CPT | Performed by: FAMILY MEDICINE

## 2025-01-13 RX ORDER — NITROFURANTOIN 25; 75 MG/1; MG/1
100 CAPSULE ORAL 2 TIMES DAILY
Qty: 14 CAPSULE | Refills: 0 | Status: SHIPPED | OUTPATIENT
Start: 2025-01-13 | End: 2025-01-20

## 2025-01-13 NOTE — TELEPHONE ENCOUNTER
"See response regarding E-visit from Dr. Ling:  \"I saw this patient on January 3 for hospital follow-up.  With his complaint for the e-visit he has too many confounding factors to be able to make a reasonable judgment regarding treatment or further testing without a further conversation.  Will come up on the home screen.  He should have a visit either in person or virtual to sort through what would be the likely cause of his symptoms, he likely needs further testing\"    Called and spoke with patient and relayed provider response and recommendations. Patient endorses understanding. Patient reports frequent urination as his current symptom. (See E-visit for further information). Recommended in-person visit due to possible need for lab testing, but patient prefers virtual visit due to need for medical transport and resulting costs to patient. Patient scheduled for virtual visit today with Valentina Kelly NP.    Routing to provider for awareness of virtual appointment.    Brenda Leggett RN  [facility]    "

## 2025-01-13 NOTE — PROGRESS NOTES
Lance is a 79 year old who is being evaluated via a billable video visit.    How would you like to obtain your AVS? MyChart  If the video visit is dropped, the invitation should be resent by: Text to cell phone: [phone]  Will anyone else be joining your video visit? No      Assessment & Plan     Urinary frequency    Urinary urgency  Patient has had urinary frequency and urgency for the last week or so.  I suspect possible urinary tract infection.  It is difficult for patient to come into clinic to leave a urine sample though I did place order to have this done and recommend that he try to do so today or tomorrow.  I will treat empirically with Macrobid twice daily for 7 days.  We reviewed signs and symptoms of worsening illness/infection to monitor for and symptoms that would warrant immediate evaluation.  He is comfortable with this plan.  - UA Macroscopic with reflex to Microscopic and Culture - Lab Collect  - nitroFURantoin macrocrystal-monohydrate (MACROBID) 100 MG capsule  Dispense: 14 capsule; Refill: 0        BMI  Estimated body mass index is 26.04 kg/m  as calculated from the following:    Height as of 1/7/25: 1.829 m (6').    Weight as of 1/7/25: 87.1 kg (192 lb).     Subjective   Lance is a 79 year old, presenting for the following health issues:  UTI (Frequent urination )        9/10/2024    10:38 AM   Additional Questions   Roomed by Marcella MORIN CMA       Video Start Time:  2:00 pm    HPI     The patient presents today for video visit to discuss concerns of a possible urinary tract infection.  He was evaluated in [facility] for shortness of breath about a week ago.  He first noticed some urinary urgency and frequency while in the hospital.  He has since been discharged to his assisted living where he continues to have urinary frequency and urgency.  He states that he is to urinate every 1/2 hour or so.  He has not noticed any blood in his urine.  He does not have any abdominal pain or back pain.   No systemic symptoms of fevers, chills, body aches etc.  He states it is difficult for him to come in to leave a urine sample as he requires medical transportation.  He states that both of his children are busy with their jobs.    Review of Systems - pertinent positives noted in HPI, otherwise ROS is negative.        Objective    Vitals - Patient Reported  Weight (Patient Reported):  (not reported)  Height (Patient Reported):  (not reported)      Vitals:  No vitals were obtained today due to virtual visit.    Physical Exam   GENERAL: alert and no distress  EYES: Eyes grossly normal to inspection.  No discharge or erythema, or obvious scleral/conjunctival abnormalities.  RESP: No audible wheeze, cough, or visible cyanosis.    SKIN: Visible skin clear. No significant rash, abnormal pigmentation or lesions.  NEURO: Cranial nerves grossly intact.  Mentation and speech appropriate for age.  PSYCH: Appropriate affect, tone, and pace of words      Video-Visit Details    Type of service:  Video Visit   Video End Time:2:16 PM  Originating Location (pt. Location): Assisted Living    Distant Location (provider location):  On-site  Platform used for Video Visit: Brina  Signed Electronically by: RANJAN Marcelino CNP

## 2025-01-13 NOTE — TELEPHONE ENCOUNTER
I saw this patient on January 3 for hospital follow-up.  With his complaint for the e-visit he has too many confounding factors to be able to make a reasonable judgment regarding treatment or further testing without a further conversation.  Will come up on the home screen.  He should have a visit either in person or virtual to sort through what would be the likely cause of his symptoms, he likely needs further testing.

## 2025-01-16 ENCOUNTER — OFFICE VISIT (OUTPATIENT)
Dept: CARDIOLOGY | Facility: CLINIC | Age: 80
End: 2025-01-16
Attending: NURSE PRACTITIONER
Payer: COMMERCIAL

## 2025-01-16 VITALS
HEART RATE: 81 BPM | BODY MASS INDEX: 26.55 KG/M2 | SYSTOLIC BLOOD PRESSURE: 155 MMHG | HEIGHT: 72 IN | TEMPERATURE: 97.7 F | OXYGEN SATURATION: 99 % | DIASTOLIC BLOOD PRESSURE: 74 MMHG | RESPIRATION RATE: 16 BRPM | WEIGHT: 196 LBS

## 2025-01-16 DIAGNOSIS — E78.5 DYSLIPIDEMIA: ICD-10-CM

## 2025-01-16 DIAGNOSIS — I42.9 CARDIOMYOPATHY, UNSPECIFIED TYPE (H): ICD-10-CM

## 2025-01-16 DIAGNOSIS — I10 HYPERTENSION, UNSPECIFIED TYPE: Primary | ICD-10-CM

## 2025-01-16 DIAGNOSIS — I50.22 CHRONIC SYSTOLIC CONGESTIVE HEART FAILURE (H): ICD-10-CM

## 2025-01-16 DIAGNOSIS — I25.10 CORONARY ARTERY DISEASE INVOLVING NATIVE CORONARY ARTERY OF NATIVE HEART WITHOUT ANGINA PECTORIS: ICD-10-CM

## 2025-01-16 RX ORDER — LISINOPRIL 30 MG/1
30 TABLET ORAL DAILY
Qty: 90 TABLET | Refills: 3 | Status: SHIPPED | OUTPATIENT
Start: 2025-01-16

## 2025-01-16 NOTE — LETTER
1/16/2025    Rickey Adamson MD  [address]  [facility] [address]    RE: Lance MORALES Manuelsantos       Dear Colleague,     I had the pleasure of seeing Lance Ibrahim in the [facility].         [facility] [place] [address], [place] [address]   [email or web address]   OFFICE: [phone]          Impression and Plan     1.  Coronary artery disease.  Lance has known coronary artery disease.  Specifically, Lance underwent three-vessel coronary artery bypass graft surgery 18 November 2024 with KALEY graft to the LAD, saphenous vein graft to the ramus intermedius, and saphenous vein graft to obtuse marginal.     This is stable.  He denies any anginal type chest pain symptoms concerning for angina.     2.  Cardiomyopathy.  In addition, he has evidence of ischemic cardiomyopathy with echocardiogram 24 November 2024 revealing ejection fraction of 20-25%.  Continue lisinopril, but will increase to 30 mg daily vis-à-vis problem #3  Continue bumetanide 1 mg daily.  Continue spironolactone 25 mg daily.  Continue metoprolol succinate 100 mg daily.  Plan to obtain cardiac MRI in approximately 6-8 weeks to reassess left ventricular systolic performance.     3.  Hypertension.  Blood pressure is reasonable in the office today at 124/72 mmHg.  Continue management as per problem #2, but will increase lisinopril from 20 mg daily to 30 mg daily.     4.  Dyslipidemia.  Lipid profile 14 November 2024 revealed LDL 88 mg/dL and HDL 23 mg/dL.  Continue atorvastatin.        Plan on follow-up in approximately 1 year.  Lance is quite comfortable with this plan given his overall stability.    35 minutes spent reviewing prior records (including documentation, laboratory studies, cardiac testing/imaging), interview with patient along with physical exam, planning, and subsequent documentation/crafting of note).           History of Present Illness    Once again I would like to thank you again  for asking me to participate in the care of your patient, Lance Ibrahim.  As you know, but to reiterate for my own records, Lance Ibrahim is a 79 year old male with known coronary artery disease.  Specifically, Lance underwent three-vessel coronary artery bypass graft surgery 18 November 2024 with KALEY graft to the LAD, saphenous vein graft to the ramus intermedius, and saphenous vein graft to obtuse marginal.    In addition, he has evidence of ischemic cardiomyopathy with echocardiogram 24 November 2024 revealing ejection fraction of 20-25%.     On interview, Lance states that he has been doing well from a cardiac standpoint.  He denies chest pain.  His breathing is been comfortable.  He has been doing physical therapy.    Further review of systems is otherwise negative/noncontributory (medical record and 13 point review of systems reviewed as well and pertinent positives noted).         Cardiac Diagnostics      Echocardiogram 24 November 2024:  Normal left ventricular size with severely reduced left ventricular systolic performance.  Ejection fraction 20-25%.  There is severe global reduction left ventricular systolic performance.  Mild aortic insufficiency.  Normal right ventricular size with moderately reduced right ventricular systolic performance.  No comments about the atria.    Echocardiogram 16 March 2020:  Normal left ventricular size and systolic performance with ejection fraction of 50 to 55%.  Trace aortic insufficiency.  Normal right ventricular size and systolic performance.  Normal atrial dimensions.    Regadenoson nuclear perfusion imaging study 18 February 2019:  Myocardial perfusion imaging using single isotope technique demonstrated mostly fixed mild inferior defect. Probably related to attenuation artifacts, however cannot exclude mild ischemia in this area. There is a second fixed basal anterior defect consistent with attenuation artifact. (technically very poor study)  Gated images demonstrated  "mildly dilated LV cavity size and mildly reduced LV systolic function.  The left ventricular systolic function is 49%..    Coronary angiogram 14 November 2024:  Severe eccentric calcified proximal to mid left main stenosis.  Minimal cross-sectional area by IVUS equals 3.22 mm .  Distal left main reference segment equals 18.3 mm   Codominant circumflex system.  Mild to moderate luminal irregularity but no severe stenoses in LAD or left circumflex.  New  RCA small to medium caliber vessel with total proximal occlusion and bridging collaterals filling distal vessel.  Some competitive flow from left-sided collaterals noted as well.  LVEDP = 27 mmHg  No LV-Ao gradient    Coronary angiography 5 March 2019:  Left dominant coronary system.   Left main artery: The left main artery has disease in its proximal portion.  It is best visualized in the straight caudal view. It appears to be no more than 30-40%, but I am suspicious that the entire left main may actually be somewhat diffusely diseased. The reason is if one applies Bo's law, this left main artery even distally where it is \"normal\" is still smaller than the proximal LAD and the left circumflex which would violate Bo's law suggesting to me that the left main is probably diffusely diseased.  Left anterior descending:  The LAD has a proximal narrowing before the first septal . It is a smooth narrowing of approximately 30-35%. The remainder of the LAD is relatively normal. The remainder of the LAD is relatively normal until one gets to the apex. At this point the LAD is a very small vessel measuring less than 1.5 to 1.7 mm in diameter. The vessel is subtotally occluded. This is a wraparound LAD however, so no doubt the apex and the distal inferior wall would appear ischemic on the stress test. As I mentioned, the stress test actually showed fixed infarct, not necessarily reversible ischemia.  Left circumflex: The left circumflex as mentioned is a " dominant system. There is mild but diffuse disease throughout the proximal left circumflex with narrowing up to 30-35% narrowing. There is then only trivial scattered plaque from the mid left circumflex into the left PDA.  Ramus intermedius: There is a ramus intermedius which could also be called a high OM1. It has only mild disease under 35%.  Right coronary artery:  The right coronary artery is a nondominant vessel. It has mild disease in its midportion. It may have up to 30-40% narrowing, but again the vessel is well under 1.75 mm in diameter at this point.    Ankle-brachial indices 26 August 2022:  Right lower extremity: KAREN at rest is normal.  Left lower extremity: KAREN at rest is normal.         Physical Examination       BP (!) 155/74 (BP Location: Right arm, Patient Position: Sitting, Cuff Size: Adult Large)   Pulse 81   Temp 97.7  F (36.5  C) (Oral)   Resp 16   Ht 1.829 m (6')   Wt 88.9 kg (196 lb)   SpO2 99%   BMI 26.58 kg/m          Wt Readings from Last 3 Encounters:   01/16/25 88.9 kg (196 lb)   01/07/25 87.1 kg (192 lb)   12/17/24 86.2 kg (190 lb)       The patient is alert and oriented times three. Sclerae are anicteric. Mucosal membranes are moist. Jugular venous pressure is normal. No significant adenopathy/thyromegally appreciated. Lungs are clear with good expansion. On cardiovascular exam, the patient has a regular S1 and S2. Abdomen is soft and non-tender. Extremities reveal no clubbing, cyanosis, mild bilateral lower extremity edema (chronic).         Family History/Social History/Risk Factors   Patient does not smoke.  Family history reviewed, and family history includes Alcohol/Drug in his paternal grandfather; Cancer in his father; Diabetes in his maternal grandfather; Family History Negative in his mother.          Medical History  Surgical History Family History Social History   Past Medical History:   Diagnosis Date     Anemia      Arthritis     osteoarthritis knees     BPH      CAD  (coronary artery disease)     subtotal occlusion in the small distal LAD      Cardiomyopathy      Cerebral artery occlusion with cerebral infarction     TIA 1993, no residual     Cervicalgia      CHF (congestive heart failure) (H)      Chronic kidney disease      Chronic low back pain      Chronic rhinitis      Gouty arthropathy     hands     Hyperlipidemia      Hypertension      Kidney stone      Nocturia      Obesity      Osteomyelitis (H) 08/2023    Foot     PVD (peripheral vascular disease)      Sleep apnea     doesn't use cpap     Spinal cord stimulator status 04/2024    Spinal cord stimulator in place     TIA (transient ischaemic attack) 1993     Type 2 diabetes mellitus - 11/08/2018     Ureteral stone      Past Surgical History:   Procedure Laterality Date     AMPUTATE FOOT Right 08/07/2023    Procedure: AMPUTATION, right hallux;  Surgeon: Nakul Salazar DPM;  Location: [facility] Main OR     AMPUTATE TOE(S) Left 10/15/2018    Procedure: AMPUTATE TOE(S);  Left third toe amputation;  Surgeon: Ayaka Azevedo DPM, Podiatry/Foot and Ankle Surgery;  Location:  OR     ARTHROPLASTY KNEE Right 12/07/2018    Procedure: Right total knee arthroplasty;  Surgeon: Issa Cunha MD;  Location:  OR     COLONOSCOPY  03/09/2013    Procedure: COLONOSCOPY;  COLONOSCOPY;  Surgeon: Chau Hogan MD;  Location:  GI     CORONARY ARTERY BYPASS GRAFT, WITH ENDOSCOPIC VESSEL PROCUREMENT N/A 11/18/2024    Procedure: CORONARY ARTERY BYPASS GRAFT TIMES THREE, WITH LEFT INTERNAL MAMARARY ARTERY HARVEST, LEFT ENDOSCOPIC VESSEL PROCUREMENT, EPIAORTIC ULTRASOUND;  Surgeon: Tsreing Evans MD;  Location: [facility] Main OR     CV CORONARY ANGIOGRAM N/A 11/14/2024    Procedure: Coronary Angiogram;  Surgeon: Talon Lew MD;  Location: [facility] CATH LAB CV     CV HEART CATHETERIZATION WITH POSSIBLE INTERVENTION Left 03/05/2019    Procedure: Coronary Angiogram;  Surgeon: Nas Linda MD;  Location: [facility]  CARDIAC CATH LAB     CV INTRA AORTIC BALLOON N/A 11/16/2024    Procedure: Intra aortic Balloon Pump Insertion;  Surgeon: Jessica Yepez MD;  Location: [facility] CV     CV INTRAVASULAR ULTRASOUND N/A 11/14/2024    Procedure: Intravascular Ultrasound;  Surgeon: Talon Lew MD;  Location: [facility] LAB CV     CV LEFT HEART CATH N/A 11/14/2024    Procedure: Left Heart Catheterization;  Surgeon: Talon Lew MD;  Location: [facility] CV     Diastasis recti repair  1985     ENDOSCOPIC RETROGRADE CHOLANGIOPANCREATOGRAM N/A 04/30/2024    Procedure: ENDOSCOPIC RETROGRADE CHOLANGIOPANCREATOGRAPHY, BILIARY SPHINCTEROTOMY, DILATION, BRUSHING, BIOPSIES with DISTAL EXTRA HEPATIC BILE DUCT STRICTURE;  Surgeon: Aldo Gongora MD;  Location: [facility] OR     ESOPHAGOSCOPY, GASTROSCOPY, DUODENOSCOPY (EGD), COMBINED N/A 04/30/2024    Procedure: ESOPHAGOGASTRODUODENOSCOPY, WITH ENDOSCOPIC ULTRASOUND GUIDANCE;  Surgeon: Aldo Gongora MD;  Location: [facility] OR     EXTRACAPSULAR CATARACT EXTRATION WITH INTRAOCULAR LENS IMPLANT Left 03/13/2017     EXTRACAPSULAR CATARACT EXTRATION WITH INTRAOCULAR LENS IMPLANT Right      FOOT SURGERY  04/2013    cyst removal      HERNIA REPAIR  07/13/2004    ventral      IR DISC ASPIRATION INJECTION  6/19/2024     IRRIGATION AND DEBRIDEMENT SHOULDER, COMBINED Right 8/7/2024    Procedure: IRRIGATION AND DEBRIDEMENT, SHOULDER;  Surgeon: Terence Hanson MD;  Location: [facility] OR     IRRIGATION AND DEBRIDEMENT UPPER EXTREMITY, COMBINED Right 8/7/2024    Procedure: IRRIGATION AND DEBRIDEMENT, ELBOW AND THUMB;  Surgeon: Terence Hanson MD;  Location: [facility] OR     ORIF Shoulder Left      PROSTATE SURGERY  02/2024    Urolift     RESECT CLAVICLE DISTAL Right 8/7/2024    Procedure: EXCISION, CLAVICLE, DISTAL;  Surgeon: Terence Hanson MD;  Location: [facility] OR     SPINAL CORD STIMULATOR IMPLANT  04/03/2024      TRANSESOPHAGEAL ECHOCARDIOGRAM INTRAOPERATIVE N/A 2024    Procedure: ECHOCARDIOGRAM, TRANSESOPHAGEAL, INTRAOPERATIVE;  Surgeon: Tsering Evans MD;  Location: [facility] Main OR     Ventral hernia repair NOS  1987     Family History   Problem Relation Age of Onset     Family History Negative Mother          88 yo     Cancer Father          74 yo brain     Diabetes Maternal Grandfather          91 yo     Alcohol/Drug Paternal Grandfather              Colon Cancer No family hx of         Social History     Socioeconomic History     Marital status:      Spouse name: Not on file     Number of children: Not on file     Years of education: Not on file     Highest education level: Not on file   Occupational History     Employer: SELF   Tobacco Use     Smoking status: Former     Current packs/day: 0.00     Types: Cigarettes     Quit date: 3/17/1993     Years since quittin.8     Passive exposure: Never     Smokeless tobacco: Never   Vaping Use     Vaping status: Never Used   Substance and Sexual Activity     Alcohol use: Not Currently     Drug use: No     Sexual activity: Never   Other Topics Concern     Parent/sibling w/ CABG, MI or angioplasty before 65F 55M? Not Asked   Social History Narrative     Not on file     Social Drivers of Health     Financial Resource Strain: Low Risk  (11/15/2024)    Financial Resource Strain      Within the past 12 months, have you or your family members you live with been unable to get utilities (heat, electricity) when it was really needed?: No   Food Insecurity: Low Risk  (11/15/2024)    Food Insecurity      Within the past 12 months, did you worry that your food would run out before you got money to buy more?: No      Within the past 12 months, did the food you bought just not last and you didn t have money to get more?: No   Transportation Needs: Low Risk  (11/15/2024)    Transportation Needs      Within the past 12 months, has lack  of transportation kept you from medical appointments, getting your medicines, non-medical meetings or appointments, work, or from getting things that you need?: No   Physical Activity: Not on file   Stress: Not on file   Social Connections: Not on file   Interpersonal Safety: Low Risk  (11/14/2024)    Interpersonal Safety      Do you feel physically and emotionally safe where you currently live?: Yes      Within the past 12 months, have you been hit, slapped, kicked or otherwise physically hurt by someone?: No      Within the past 12 months, have you been humiliated or emotionally abused in other ways by your partner or ex-partner?: No   Housing Stability: Low Risk  (11/15/2024)    Housing Stability      Do you have housing? : Yes      Are you worried about losing your housing?: No           Medications  Allergies   Current Outpatient Medications   Medication Sig Dispense Refill     acetaminophen (TYLENOL) 500 MG tablet Take 1,000 mg by mouth 3 times daily. TID while awake and PRN nightly       allopurinol (ZYLOPRIM) 100 MG tablet Take 100 mg by mouth daily.       aspirin (ASA) 81 MG chewable tablet Take 2 tablets (162 mg) by mouth daily.       atorvastatin (LIPITOR) 40 MG tablet Take 1 tablet (40 mg) by mouth every evening.       blood glucose monitoring (NO BRAND SPECIFIED) meter device kit Use to test blood sugar 2 times daily or as directed. 1 kit 0     bumetanide (BUMEX) 1 MG tablet Take 1 tablet (1 mg) by mouth daily.       calcium carbonate (TUMS) 500 MG chewable tablet Take 1,000 mg by mouth 3 times daily as needed for heartburn       colchicine (COLCRYS) 0.6 MG tablet Take 1 tablet by mouth 2 times daily.       Continuous Glucose Sensor (DEXCOM G7 SENSOR) MISC 1 each every 10 days. Change sensor every 10 days 9 each 5     diclofenac (VOLTAREN) 1 % topical gel Apply 2 g topically 2 times daily as needed for moderate pain.       Ferrous Gluconate 324 (37.5 Fe) MG TABS Take 1 tablet by mouth daily.        "finasteride (PROSCAR) 5 MG tablet Take 5 mg by mouth daily       fluticasone (FLONASE) 50 MCG/ACT nasal spray Spray 1 spray into both nostrils daily. 9.9 g 1     gabapentin (NEURONTIN) 300 MG capsule Take 1 capsule (300 mg) by mouth 3 times daily as needed.       glipiZIDE (GLUCOTROL XL) 2.5 MG 24 hr tablet Take 1 tablet (2.5 mg) by mouth daily.       insulin glargine (LANTUS PEN) 100 UNIT/ML pen Inject 14 Units subcutaneously 2 times daily. 15 mL 2     lisinopril (ZESTRIL) 20 MG tablet Take 1 tablet (20 mg) by mouth daily. 90 tablet 3     metFORMIN (GLUCOPHAGE XR) 500 MG 24 hr tablet Take 1 tablet (500 mg) by mouth daily (with dinner). 30 tablet 5     metoprolol succinate ER (TOPROL XL) 100 MG 24 hr tablet Take 1 tablet (100 mg) by mouth daily.       nitroFURantoin macrocrystal-monohydrate (MACROBID) 100 MG capsule Take 1 capsule (100 mg) by mouth 2 times daily for 7 days. 14 capsule 0     pantoprazole (PROTONIX) 40 MG EC tablet Take 1 tablet (40 mg) by mouth every morning (before breakfast).       sodium bicarbonate 650 MG tablet Take 3 tablets (1,950 mg) by mouth 3 times daily.       spironolactone (ALDACTONE) 25 MG tablet Take 1 tablet (25 mg) by mouth daily.       traZODone (DESYREL) 50 MG tablet Take 25 mg by mouth at bedtime.       ursodiol (ACTIGALL) 300 MG capsule Take 1 capsule (300 mg) by mouth 2 times daily 60 capsule 0     White Petrolatum-Mineral Oil (LUBRIFRESH P.M.) OINT Apply 1 g to eye 2 times daily as needed for dry eyes.         Allergies   Allergen Reactions     Ancef [Cefazolin] Rash     Cephalexin Itching and Rash     Allergic reaction required hospitalization 4/2024     Penicillins Anaphylaxis     Other Drug Allergy (See Comments) Unknown     Sulfa Antibiotics      \"itchy rash, swelling of face and hands\"          Lab Results    Chemistry/lipid CBC Cardiac Enzymes/BNP/TSH/INR   Recent Labs   Lab Test 11/14/24 2045   CHOL 127   HDL 23*   LDL 88   TRIG 80     Recent Labs   Lab Test " "11/14/24  2045 04/27/23  1248 06/16/22  0934   LDL 88 39 67     Recent Labs   Lab Test 01/06/25  0914      POTASSIUM 4.7   CHLORIDE 103   CO2 22   *   BUN 32.8*   CR 1.07   GFRESTIMATED 70   SHANNAN 9.4     Recent Labs   Lab Test 01/06/25  0914 01/03/25  1320 12/03/24  1006   CR 1.07 1.33* 1.35*     Recent Labs   Lab Test 11/14/24  1933 06/18/24  0845 02/27/24  1113   A1C 9.1* 8.0* 8.0*          Recent Labs   Lab Test 01/06/25  0914   WBC 7.5   HGB 10.8*   HCT 34.9*   MCV 89        Recent Labs   Lab Test 01/06/25  0914 01/03/25  1320 12/06/24  0647   HGB 10.8* 11.0* 10.3*    No results for input(s): \"TROPONINI\" in the last 86960 hours.  Recent Labs   Lab Test 01/06/25  0914 11/14/24  1023 05/17/24  1655 05/16/24  1838 04/26/24  0430 04/25/24  2329 02/28/20  1318   NTBNPI 15,028* 19,613* 2,058*   < >  --   --   --    NTBNP  --   --   --   --  959 1,078 178    < > = values in this interval not displayed.     Recent Labs   Lab Test 11/14/24  1023   TSH 4.28*     Recent Labs   Lab Test 11/29/24  0930 11/19/24  0801 11/18/24  1630   INR 1.23* 1.62* 1.00          Medications  Allergies   Current Outpatient Medications   Medication Sig Dispense Refill     acetaminophen (TYLENOL) 500 MG tablet Take 1,000 mg by mouth 3 times daily. TID while awake and PRN nightly       allopurinol (ZYLOPRIM) 100 MG tablet Take 100 mg by mouth daily.       aspirin (ASA) 81 MG chewable tablet Take 2 tablets (162 mg) by mouth daily.       atorvastatin (LIPITOR) 40 MG tablet Take 1 tablet (40 mg) by mouth every evening.       blood glucose monitoring (NO BRAND SPECIFIED) meter device kit Use to test blood sugar 2 times daily or as directed. 1 kit 0     bumetanide (BUMEX) 1 MG tablet Take 1 tablet (1 mg) by mouth daily.       calcium carbonate (TUMS) 500 MG chewable tablet Take 1,000 mg by mouth 3 times daily as needed for heartburn       colchicine (COLCRYS) 0.6 MG tablet Take 1 tablet by mouth 2 times daily.       Continuous " Glucose Sensor (DEXCOM G7 SENSOR) MISC 1 each every 10 days. Change sensor every 10 days 9 each 5     diclofenac (VOLTAREN) 1 % topical gel Apply 2 g topically 2 times daily as needed for moderate pain.       Ferrous Gluconate 324 (37.5 Fe) MG TABS Take 1 tablet by mouth daily.       finasteride (PROSCAR) 5 MG tablet Take 5 mg by mouth daily       fluticasone (FLONASE) 50 MCG/ACT nasal spray Spray 1 spray into both nostrils daily. 9.9 g 1     gabapentin (NEURONTIN) 300 MG capsule Take 1 capsule (300 mg) by mouth 3 times daily as needed.       glipiZIDE (GLUCOTROL XL) 2.5 MG 24 hr tablet Take 1 tablet (2.5 mg) by mouth daily.       insulin glargine (LANTUS PEN) 100 UNIT/ML pen Inject 14 Units subcutaneously 2 times daily. 15 mL 2     lisinopril (ZESTRIL) 20 MG tablet Take 1 tablet (20 mg) by mouth daily. 90 tablet 3     metFORMIN (GLUCOPHAGE XR) 500 MG 24 hr tablet Take 1 tablet (500 mg) by mouth daily (with dinner). 30 tablet 5     metoprolol succinate ER (TOPROL XL) 100 MG 24 hr tablet Take 1 tablet (100 mg) by mouth daily.       nitroFURantoin macrocrystal-monohydrate (MACROBID) 100 MG capsule Take 1 capsule (100 mg) by mouth 2 times daily for 7 days. 14 capsule 0     pantoprazole (PROTONIX) 40 MG EC tablet Take 1 tablet (40 mg) by mouth every morning (before breakfast).       sodium bicarbonate 650 MG tablet Take 3 tablets (1,950 mg) by mouth 3 times daily.       spironolactone (ALDACTONE) 25 MG tablet Take 1 tablet (25 mg) by mouth daily.       traZODone (DESYREL) 50 MG tablet Take 25 mg by mouth at bedtime.       ursodiol (ACTIGALL) 300 MG capsule Take 1 capsule (300 mg) by mouth 2 times daily 60 capsule 0     White Petrolatum-Mineral Oil (LUBRIFRESH P.M.) OINT Apply 1 g to eye 2 times daily as needed for dry eyes.        Allergies   Allergen Reactions     Ancef [Cefazolin] Rash     Cephalexin Itching and Rash     Allergic reaction required hospitalization 4/2024     Penicillins Anaphylaxis     Other Drug  "Allergy (See Comments) Unknown     Sulfa Antibiotics      \"itchy rash, swelling of face and hands\"          Lab Results   Lab Results   Component Value Date     01/06/2025     04/29/2021    CO2 22 01/06/2025    CO2 22 08/27/2022    CO2 27 04/29/2021    BUN 32.8 01/06/2025    BUN 16 08/27/2022    BUN 27 04/29/2021    GFR 15 12/03/2021     Lab Results   Component Value Date    WBC 7.5 01/06/2025    WBC 8.5 04/29/2021    HGB 10.8 01/06/2025    HGB 12.2 04/29/2021    HCT 34.9 01/06/2025    HCT 39.6 04/29/2021    MCV 89 01/06/2025    MCV 92 04/29/2021     01/06/2025     04/29/2021     Lab Results   Component Value Date    CHOL 127 11/14/2024    CHOL 132 04/29/2021    TRIG 80 11/14/2024    TRIG 153 04/29/2021    HDL 23 11/14/2024    HDL 53 04/29/2021     Lab Results   Component Value Date    INR 1.23 11/29/2024    INR 1.13 03/04/2019     No results found for: \"BNP\"  No results found for: \"CKTOTAL\", \"CKMB\", \"TROPONINI\"  Lab Results   Component Value Date    TSH 4.28 11/14/2024    TSH 3.24 01/24/2020                      Thank you for allowing me to participate in the care of your patient.      Sincerely,     Lc Amaya MD     [facility]  cc:   Mercedes Leiva, NP  [address] [place] [address]      "

## 2025-01-16 NOTE — PROGRESS NOTES
[facility] HEART CARE [address], [place] [address]   [email or web address]   OFFICE: [phone]          Impression and Plan     1.  Coronary artery disease.  Lance has known coronary artery disease.  Specifically, Lance underwent three-vessel coronary artery bypass graft surgery 18 November 2024 with KALEY graft to the LAD, saphenous vein graft to the ramus intermedius, and saphenous vein graft to obtuse marginal.     This is stable.  He denies any anginal type chest pain symptoms concerning for angina.     2.  Cardiomyopathy.  In addition, he has evidence of ischemic cardiomyopathy with echocardiogram 24 November 2024 revealing ejection fraction of 20-25%.  Continue lisinopril, but will increase to 30 mg daily vis-à-vis problem #3  Continue bumetanide 1 mg daily.  Continue spironolactone 25 mg daily.  Continue metoprolol succinate 100 mg daily.  Plan to obtain cardiac MRI in approximately 6-8 weeks to reassess left ventricular systolic performance.     3.  Hypertension.  Blood pressure is reasonable in the office today at 124/72 mmHg.  Continue management as per problem #2, but will increase lisinopril from 20 mg daily to 30 mg daily.     4.  Dyslipidemia.  Lipid profile 14 November 2024 revealed LDL 88 mg/dL and HDL 23 mg/dL.  Continue atorvastatin.        Plan on follow-up in approximately 1 year.  Lance is quite comfortable with this plan given his overall stability.    35 minutes spent reviewing prior records (including documentation, laboratory studies, cardiac testing/imaging), interview with patient along with physical exam, planning, and subsequent documentation/crafting of note).           History of Present Illness    Once again I would like to thank you again for asking me to participate in the care of your patient, Lance Ibrahim.  As you know, but to reiterate for my own records, Lance Ibrahim is a 79 year old male with known coronary artery disease.   Specifically, Lance underwent three-vessel coronary artery bypass graft surgery 18 November 2024 with KALEY graft to the LAD, saphenous vein graft to the ramus intermedius, and saphenous vein graft to obtuse marginal.    In addition, he has evidence of ischemic cardiomyopathy with echocardiogram 24 November 2024 revealing ejection fraction of 20-25%.     On interview, Lance states that he has been doing well from a cardiac standpoint.  He denies chest pain.  His breathing is been comfortable.  He has been doing physical therapy.    Further review of systems is otherwise negative/noncontributory (medical record and 13 point review of systems reviewed as well and pertinent positives noted).         Cardiac Diagnostics      Echocardiogram 24 November 2024:  Normal left ventricular size with severely reduced left ventricular systolic performance.  Ejection fraction 20-25%.  There is severe global reduction left ventricular systolic performance.  Mild aortic insufficiency.  Normal right ventricular size with moderately reduced right ventricular systolic performance.  No comments about the atria.    Echocardiogram 16 March 2020:  Normal left ventricular size and systolic performance with ejection fraction of 50 to 55%.  Trace aortic insufficiency.  Normal right ventricular size and systolic performance.  Normal atrial dimensions.    Regadenoson nuclear perfusion imaging study 18 February 2019:  Myocardial perfusion imaging using single isotope technique demonstrated mostly fixed mild inferior defect. Probably related to attenuation artifacts, however cannot exclude mild ischemia in this area. There is a second fixed basal anterior defect consistent with attenuation artifact. (technically very poor study)  Gated images demonstrated mildly dilated LV cavity size and mildly reduced LV systolic function.  The left ventricular systolic function is 49%..    Coronary angiogram 14 November 2024:  Severe eccentric calcified proximal  "to mid left main stenosis.  Minimal cross-sectional area by IVUS equals 3.22 mm .  Distal left main reference segment equals 18.3 mm   Codominant circumflex system.  Mild to moderate luminal irregularity but no severe stenoses in LAD or left circumflex.  New  RCA small to medium caliber vessel with total proximal occlusion and bridging collaterals filling distal vessel.  Some competitive flow from left-sided collaterals noted as well.  LVEDP = 27 mmHg  No LV-Ao gradient    Coronary angiography 5 March 2019:  Left dominant coronary system.   Left main artery: The left main artery has disease in its proximal portion.  It is best visualized in the straight caudal view. It appears to be no more than 30-40%, but I am suspicious that the entire left main may actually be somewhat diffusely diseased. The reason is if one applies Bo's law, this left main artery even distally where it is \"normal\" is still smaller than the proximal LAD and the left circumflex which would violate Bo's law suggesting to me that the left main is probably diffusely diseased.  Left anterior descending:  The LAD has a proximal narrowing before the first septal . It is a smooth narrowing of approximately 30-35%. The remainder of the LAD is relatively normal. The remainder of the LAD is relatively normal until one gets to the apex. At this point the LAD is a very small vessel measuring less than 1.5 to 1.7 mm in diameter. The vessel is subtotally occluded. This is a wraparound LAD however, so no doubt the apex and the distal inferior wall would appear ischemic on the stress test. As I mentioned, the stress test actually showed fixed infarct, not necessarily reversible ischemia.  Left circumflex: The left circumflex as mentioned is a dominant system. There is mild but diffuse disease throughout the proximal left circumflex with narrowing up to 30-35% narrowing. There is then only trivial scattered plaque from the mid left circumflex " into the left PDA.  Ramus intermedius: There is a ramus intermedius which could also be called a high OM1. It has only mild disease under 35%.  Right coronary artery:  The right coronary artery is a nondominant vessel. It has mild disease in its midportion. It may have up to 30-40% narrowing, but again the vessel is well under 1.75 mm in diameter at this point.    Ankle-brachial indices 26 August 2022:  Right lower extremity: KAREN at rest is normal.  Left lower extremity: KAREN at rest is normal.         Physical Examination       BP (!) 155/74 (BP Location: Right arm, Patient Position: Sitting, Cuff Size: Adult Large)   Pulse 81   Temp 97.7  F (36.5  C) (Oral)   Resp 16   Ht 1.829 m (6')   Wt 88.9 kg (196 lb)   SpO2 99%   BMI 26.58 kg/m          Wt Readings from Last 3 Encounters:   01/16/25 88.9 kg (196 lb)   01/07/25 87.1 kg (192 lb)   12/17/24 86.2 kg (190 lb)       The patient is alert and oriented times three. Sclerae are anicteric. Mucosal membranes are moist. Jugular venous pressure is normal. No significant adenopathy/thyromegally appreciated. Lungs are clear with good expansion. On cardiovascular exam, the patient has a regular S1 and S2. Abdomen is soft and non-tender. Extremities reveal no clubbing, cyanosis, mild bilateral lower extremity edema (chronic).         Family History/Social History/Risk Factors   Patient does not smoke.  Family history reviewed, and family history includes Alcohol/Drug in his paternal grandfather; Cancer in his father; Diabetes in his maternal grandfather; Family History Negative in his mother.          Medical History  Surgical History Family History Social History   Past Medical History:   Diagnosis Date    Anemia     Arthritis     osteoarthritis knees    BPH     CAD (coronary artery disease)     subtotal occlusion in the small distal LAD     Cardiomyopathy     Cerebral artery occlusion with cerebral infarction     TIA 1993, no residual    Cervicalgia     CHF  (congestive heart failure) (H)     Chronic kidney disease     Chronic low back pain     Chronic rhinitis     Gouty arthropathy     hands    Hyperlipidemia     Hypertension     Kidney stone     Nocturia     Obesity     Osteomyelitis (H) 08/2023    Foot    PVD (peripheral vascular disease)     Sleep apnea     doesn't use cpap    Spinal cord stimulator status 04/2024    Spinal cord stimulator in place    TIA (transient ischaemic attack) 1993    Type 2 diabetes mellitus - 11/08/2018    Ureteral stone      Past Surgical History:   Procedure Laterality Date    AMPUTATE FOOT Right 08/07/2023    Procedure: AMPUTATION, right hallux;  Surgeon: Nakul Salazar DPM;  Location: [facility] OR    AMPUTATE TOE(S) Left 10/15/2018    Procedure: AMPUTATE TOE(S);  Left third toe amputation;  Surgeon: Ayaka Azevedo DPM, Podiatry/Foot and Ankle Surgery;  Location:  OR    ARTHROPLASTY KNEE Right 12/07/2018    Procedure: Right total knee arthroplasty;  Surgeon: Issa Cunha MD;  Location:  OR    COLONOSCOPY  03/09/2013    Procedure: COLONOSCOPY;  COLONOSCOPY;  Surgeon: Chau Hogan MD;  Location: [facility]    CORONARY ARTERY BYPASS GRAFT, WITH ENDOSCOPIC VESSEL PROCUREMENT N/A 11/18/2024    Procedure: CORONARY ARTERY BYPASS GRAFT TIMES THREE, WITH LEFT INTERNAL MAMARARY ARTERY HARVEST, LEFT ENDOSCOPIC VESSEL PROCUREMENT, EPIAORTIC ULTRASOUND;  Surgeon: Tsering Evans MD;  Location: [facility] OR    CV CORONARY ANGIOGRAM N/A 11/14/2024    Procedure: Coronary Angiogram;  Surgeon: Talon Lew MD;  Location: [facility] CATH LAB CV    CV HEART CATHETERIZATION WITH POSSIBLE INTERVENTION Left 03/05/2019    Procedure: Coronary Angiogram;  Surgeon: Nas Linda MD;  Location:  HEART CARDIAC CATH LAB    CV INTRA AORTIC BALLOON N/A 11/16/2024    Procedure: Intra aortic Balloon Pump Insertion;  Surgeon: Jessica Yepez MD;  Location: [facility] CATH LAB CV    CV INTRAVASULAR ULTRASOUND N/A 11/14/2024     Procedure: Intravascular Ultrasound;  Surgeon: Talon Lew MD;  Location: [facility] CV    CV LEFT HEART CATH N/A 11/14/2024    Procedure: Left Heart Catheterization;  Surgeon: Talon Lew MD;  Location: [facility] CV    Diastasis recti repair  1985    ENDOSCOPIC RETROGRADE CHOLANGIOPANCREATOGRAM N/A 04/30/2024    Procedure: ENDOSCOPIC RETROGRADE CHOLANGIOPANCREATOGRAPHY, BILIARY SPHINCTEROTOMY, DILATION, BRUSHING, BIOPSIES with DISTAL EXTRA HEPATIC BILE DUCT STRICTURE;  Surgeon: Aldo Gongora MD;  Location: [facility] OR    ESOPHAGOSCOPY, GASTROSCOPY, DUODENOSCOPY (EGD), COMBINED N/A 04/30/2024    Procedure: ESOPHAGOGASTRODUODENOSCOPY, WITH ENDOSCOPIC ULTRASOUND GUIDANCE;  Surgeon: Aldo Gongora MD;  Location: [facility] OR    EXTRACAPSULAR CATARACT EXTRATION WITH INTRAOCULAR LENS IMPLANT Left 03/13/2017    EXTRACAPSULAR CATARACT EXTRATION WITH INTRAOCULAR LENS IMPLANT Right     FOOT SURGERY  04/2013    cyst removal     HERNIA REPAIR  07/13/2004    ventral     IR DISC ASPIRATION INJECTION  6/19/2024    IRRIGATION AND DEBRIDEMENT SHOULDER, COMBINED Right 8/7/2024    Procedure: IRRIGATION AND DEBRIDEMENT, SHOULDER;  Surgeon: Terence Hanson MD;  Location: [facility] OR    IRRIGATION AND DEBRIDEMENT UPPER EXTREMITY, COMBINED Right 8/7/2024    Procedure: IRRIGATION AND DEBRIDEMENT, ELBOW AND THUMB;  Surgeon: Terence Hanson MD;  Location: [facility] OR    ORIF Shoulder Left     PROSTATE SURGERY  02/2024    Urolift    RESECT CLAVICLE DISTAL Right 8/7/2024    Procedure: EXCISION, CLAVICLE, DISTAL;  Surgeon: Terence Hanson MD;  Location: [facility] OR    SPINAL CORD STIMULATOR IMPLANT  04/03/2024    TRANSESOPHAGEAL ECHOCARDIOGRAM INTRAOPERATIVE N/A 11/18/2024    Procedure: ECHOCARDIOGRAM, TRANSESOPHAGEAL, INTRAOPERATIVE;  Surgeon: Tsering Evans MD;  Location: [facility] OR    Ventral hernia repair NOS  1987     Family  History   Problem Relation Age of Onset    Family History Negative Mother          88 yo    Cancer Father          76 yo brain    Diabetes Maternal Grandfather          89 yo    Alcohol/Drug Paternal Grandfather             Colon Cancer No family hx of         Social History     Socioeconomic History    Marital status:      Spouse name: Not on file    Number of children: Not on file    Years of education: Not on file    Highest education level: Not on file   Occupational History     Employer: SELF   Tobacco Use    Smoking status: Former     Current packs/day: 0.00     Types: Cigarettes     Quit date: 3/17/1993     Years since quittin.8     Passive exposure: Never    Smokeless tobacco: Never   Vaping Use    Vaping status: Never Used   Substance and Sexual Activity    Alcohol use: Not Currently    Drug use: No    Sexual activity: Never   Other Topics Concern    Parent/sibling w/ CABG, MI or angioplasty before 65F 55M? Not Asked   Social History Narrative    Not on file     Social Drivers of Health     Financial Resource Strain: Low Risk  (11/15/2024)    Financial Resource Strain     Within the past 12 months, have you or your family members you live with been unable to get utilities (heat, electricity) when it was really needed?: No   Food Insecurity: Low Risk  (11/15/2024)    Food Insecurity     Within the past 12 months, did you worry that your food would run out before you got money to buy more?: No     Within the past 12 months, did the food you bought just not last and you didn t have money to get more?: No   Transportation Needs: Low Risk  (11/15/2024)    Transportation Needs     Within the past 12 months, has lack of transportation kept you from medical appointments, getting your medicines, non-medical meetings or appointments, work, or from getting things that you need?: No   Physical Activity: Not on file   Stress: Not on file   Social Connections: Not on file    Interpersonal Safety: Low Risk  (11/14/2024)    Interpersonal Safety     Do you feel physically and emotionally safe where you currently live?: Yes     Within the past 12 months, have you been hit, slapped, kicked or otherwise physically hurt by someone?: No     Within the past 12 months, have you been humiliated or emotionally abused in other ways by your partner or ex-partner?: No   Housing Stability: Low Risk  (11/15/2024)    Housing Stability     Do you have housing? : Yes     Are you worried about losing your housing?: No           Medications  Allergies   Current Outpatient Medications   Medication Sig Dispense Refill    acetaminophen (TYLENOL) 500 MG tablet Take 1,000 mg by mouth 3 times daily. TID while awake and PRN nightly      allopurinol (ZYLOPRIM) 100 MG tablet Take 100 mg by mouth daily.      aspirin (ASA) 81 MG chewable tablet Take 2 tablets (162 mg) by mouth daily.      atorvastatin (LIPITOR) 40 MG tablet Take 1 tablet (40 mg) by mouth every evening.      blood glucose monitoring (NO BRAND SPECIFIED) meter device kit Use to test blood sugar 2 times daily or as directed. 1 kit 0    bumetanide (BUMEX) 1 MG tablet Take 1 tablet (1 mg) by mouth daily.      calcium carbonate (TUMS) 500 MG chewable tablet Take 1,000 mg by mouth 3 times daily as needed for heartburn      colchicine (COLCRYS) 0.6 MG tablet Take 1 tablet by mouth 2 times daily.      Continuous Glucose Sensor (DEXCOM G7 SENSOR) MISC 1 each every 10 days. Change sensor every 10 days 9 each 5    diclofenac (VOLTAREN) 1 % topical gel Apply 2 g topically 2 times daily as needed for moderate pain.      Ferrous Gluconate 324 (37.5 Fe) MG TABS Take 1 tablet by mouth daily.      finasteride (PROSCAR) 5 MG tablet Take 5 mg by mouth daily      fluticasone (FLONASE) 50 MCG/ACT nasal spray Spray 1 spray into both nostrils daily. 9.9 g 1    gabapentin (NEURONTIN) 300 MG capsule Take 1 capsule (300 mg) by mouth 3 times daily as needed.      glipiZIDE  "(GLUCOTROL XL) 2.5 MG 24 hr tablet Take 1 tablet (2.5 mg) by mouth daily.      insulin glargine (LANTUS PEN) 100 UNIT/ML pen Inject 14 Units subcutaneously 2 times daily. 15 mL 2    lisinopril (ZESTRIL) 20 MG tablet Take 1 tablet (20 mg) by mouth daily. 90 tablet 3    metFORMIN (GLUCOPHAGE XR) 500 MG 24 hr tablet Take 1 tablet (500 mg) by mouth daily (with dinner). 30 tablet 5    metoprolol succinate ER (TOPROL XL) 100 MG 24 hr tablet Take 1 tablet (100 mg) by mouth daily.      nitroFURantoin macrocrystal-monohydrate (MACROBID) 100 MG capsule Take 1 capsule (100 mg) by mouth 2 times daily for 7 days. 14 capsule 0    pantoprazole (PROTONIX) 40 MG EC tablet Take 1 tablet (40 mg) by mouth every morning (before breakfast).      sodium bicarbonate 650 MG tablet Take 3 tablets (1,950 mg) by mouth 3 times daily.      spironolactone (ALDACTONE) 25 MG tablet Take 1 tablet (25 mg) by mouth daily.      traZODone (DESYREL) 50 MG tablet Take 25 mg by mouth at bedtime.      ursodiol (ACTIGALL) 300 MG capsule Take 1 capsule (300 mg) by mouth 2 times daily 60 capsule 0    White Petrolatum-Mineral Oil (LUBRIFRESH P.M.) OINT Apply 1 g to eye 2 times daily as needed for dry eyes.         Allergies   Allergen Reactions    Ancef [Cefazolin] Rash    Cephalexin Itching and Rash     Allergic reaction required hospitalization 4/2024    Penicillins Anaphylaxis    Other Drug Allergy (See Comments) Unknown    Sulfa Antibiotics      \"itchy rash, swelling of face and hands\"          Lab Results    Chemistry/lipid CBC Cardiac Enzymes/BNP/TSH/INR   Recent Labs   Lab Test 11/14/24 2045   CHOL 127   HDL 23*   LDL 88   TRIG 80     Recent Labs   Lab Test 11/14/24 2045 04/27/23  1248 06/16/22  0934   LDL 88 39 67     Recent Labs   Lab Test 01/06/25  0914      POTASSIUM 4.7   CHLORIDE 103   CO2 22   *   BUN 32.8*   CR 1.07   GFRESTIMATED 70   SHANNAN 9.4     Recent Labs   Lab Test 01/06/25  0914 01/03/25  1320 12/03/24  1006   CR 1.07 1.33* " "1.35*     Recent Labs   Lab Test 11/14/24  1933 06/18/24  0845 02/27/24  1113   A1C 9.1* 8.0* 8.0*          Recent Labs   Lab Test 01/06/25  0914   WBC 7.5   HGB 10.8*   HCT 34.9*   MCV 89        Recent Labs   Lab Test 01/06/25  0914 01/03/25  1320 12/06/24  0647   HGB 10.8* 11.0* 10.3*    No results for input(s): \"TROPONINI\" in the last 42304 hours.  Recent Labs   Lab Test 01/06/25  0914 11/14/24  1023 05/17/24  1655 05/16/24  1838 04/26/24  0430 04/25/24  2329 02/28/20  1318   NTBNPI 15,028* 19,613* 2,058*   < >  --   --   --    NTBNP  --   --   --   --  959 1,078 178    < > = values in this interval not displayed.     Recent Labs   Lab Test 11/14/24  1023   TSH 4.28*     Recent Labs   Lab Test 11/29/24  0930 11/19/24  0801 11/18/24  1630   INR 1.23* 1.62* 1.00          Medications  Allergies   Current Outpatient Medications   Medication Sig Dispense Refill    acetaminophen (TYLENOL) 500 MG tablet Take 1,000 mg by mouth 3 times daily. TID while awake and PRN nightly      allopurinol (ZYLOPRIM) 100 MG tablet Take 100 mg by mouth daily.      aspirin (ASA) 81 MG chewable tablet Take 2 tablets (162 mg) by mouth daily.      atorvastatin (LIPITOR) 40 MG tablet Take 1 tablet (40 mg) by mouth every evening.      blood glucose monitoring (NO BRAND SPECIFIED) meter device kit Use to test blood sugar 2 times daily or as directed. 1 kit 0    bumetanide (BUMEX) 1 MG tablet Take 1 tablet (1 mg) by mouth daily.      calcium carbonate (TUMS) 500 MG chewable tablet Take 1,000 mg by mouth 3 times daily as needed for heartburn      colchicine (COLCRYS) 0.6 MG tablet Take 1 tablet by mouth 2 times daily.      Continuous Glucose Sensor (DEXCOM G7 SENSOR) MISC 1 each every 10 days. Change sensor every 10 days 9 each 5    diclofenac (VOLTAREN) 1 % topical gel Apply 2 g topically 2 times daily as needed for moderate pain.      Ferrous Gluconate 324 (37.5 Fe) MG TABS Take 1 tablet by mouth daily.      finasteride (PROSCAR) 5 MG " "tablet Take 5 mg by mouth daily      fluticasone (FLONASE) 50 MCG/ACT nasal spray Spray 1 spray into both nostrils daily. 9.9 g 1    gabapentin (NEURONTIN) 300 MG capsule Take 1 capsule (300 mg) by mouth 3 times daily as needed.      glipiZIDE (GLUCOTROL XL) 2.5 MG 24 hr tablet Take 1 tablet (2.5 mg) by mouth daily.      insulin glargine (LANTUS PEN) 100 UNIT/ML pen Inject 14 Units subcutaneously 2 times daily. 15 mL 2    lisinopril (ZESTRIL) 20 MG tablet Take 1 tablet (20 mg) by mouth daily. 90 tablet 3    metFORMIN (GLUCOPHAGE XR) 500 MG 24 hr tablet Take 1 tablet (500 mg) by mouth daily (with dinner). 30 tablet 5    metoprolol succinate ER (TOPROL XL) 100 MG 24 hr tablet Take 1 tablet (100 mg) by mouth daily.      nitroFURantoin macrocrystal-monohydrate (MACROBID) 100 MG capsule Take 1 capsule (100 mg) by mouth 2 times daily for 7 days. 14 capsule 0    pantoprazole (PROTONIX) 40 MG EC tablet Take 1 tablet (40 mg) by mouth every morning (before breakfast).      sodium bicarbonate 650 MG tablet Take 3 tablets (1,950 mg) by mouth 3 times daily.      spironolactone (ALDACTONE) 25 MG tablet Take 1 tablet (25 mg) by mouth daily.      traZODone (DESYREL) 50 MG tablet Take 25 mg by mouth at bedtime.      ursodiol (ACTIGALL) 300 MG capsule Take 1 capsule (300 mg) by mouth 2 times daily 60 capsule 0    White Petrolatum-Mineral Oil (LUBRIFRESH P.M.) OINT Apply 1 g to eye 2 times daily as needed for dry eyes.        Allergies   Allergen Reactions    Ancef [Cefazolin] Rash    Cephalexin Itching and Rash     Allergic reaction required hospitalization 4/2024    Penicillins Anaphylaxis    Other Drug Allergy (See Comments) Unknown    Sulfa Antibiotics      \"itchy rash, swelling of face and hands\"          Lab Results   Lab Results   Component Value Date     01/06/2025     04/29/2021    CO2 22 01/06/2025    CO2 22 08/27/2022    CO2 27 04/29/2021    BUN 32.8 01/06/2025    BUN 16 08/27/2022    BUN 27 04/29/2021    GFR 15 " "12/03/2021     Lab Results   Component Value Date    WBC 7.5 01/06/2025    WBC 8.5 04/29/2021    HGB 10.8 01/06/2025    HGB 12.2 04/29/2021    HCT 34.9 01/06/2025    HCT 39.6 04/29/2021    MCV 89 01/06/2025    MCV 92 04/29/2021     01/06/2025     04/29/2021     Lab Results   Component Value Date    CHOL 127 11/14/2024    CHOL 132 04/29/2021    TRIG 80 11/14/2024    TRIG 153 04/29/2021    HDL 23 11/14/2024    HDL 53 04/29/2021     Lab Results   Component Value Date    INR 1.23 11/29/2024    INR 1.13 03/04/2019     No results found for: \"BNP\"  No results found for: \"CKTOTAL\", \"CKMB\", \"TROPONINI\"  Lab Results   Component Value Date    TSH 4.28 11/14/2024    TSH 3.24 01/24/2020                  "

## 2025-01-17 ENCOUNTER — HOSPITAL ENCOUNTER (EMERGENCY)
Facility: CLINIC | Age: 80
Discharge: HOME | End: 2025-01-17
Attending: STUDENT IN AN ORGANIZED HEALTH CARE EDUCATION/TRAINING PROGRAM | Admitting: STUDENT IN AN ORGANIZED HEALTH CARE EDUCATION/TRAINING PROGRAM
Payer: COMMERCIAL

## 2025-01-17 VITALS
WEIGHT: 192 LBS | BODY MASS INDEX: 26.01 KG/M2 | SYSTOLIC BLOOD PRESSURE: 136 MMHG | TEMPERATURE: 97.8 F | OXYGEN SATURATION: 100 % | HEART RATE: 71 BPM | RESPIRATION RATE: 14 BRPM | DIASTOLIC BLOOD PRESSURE: 89 MMHG | HEIGHT: 72 IN

## 2025-01-17 DIAGNOSIS — R73.9 HYPERGLYCEMIA: ICD-10-CM

## 2025-01-17 LAB
ALBUMIN SERPL BCG-MCNC: 3.6 G/DL (ref 3.5–5.2)
ALBUMIN UR-MCNC: 50 MG/DL
ALP SERPL-CCNC: 181 U/L (ref 40–150)
ALT SERPL W P-5'-P-CCNC: 12 U/L (ref 0–70)
AMORPH CRY #/AREA URNS HPF: ABNORMAL /HPF
ANION GAP SERPL CALCULATED.3IONS-SCNC: 16 MMOL/L (ref 7–15)
APPEARANCE UR: CLEAR
AST SERPL W P-5'-P-CCNC: 18 U/L (ref 0–45)
ATRIAL RATE - MUSE: 79 BPM
BACTERIA #/AREA URNS HPF: ABNORMAL /HPF
BASE EXCESS BLDV CALC-SCNC: 1.8 MMOL/L (ref -3–3)
BASOPHILS # BLD AUTO: 0 10E3/UL (ref 0–0.2)
BASOPHILS NFR BLD AUTO: 1 %
BILIRUB DIRECT SERPL-MCNC: <0.2 MG/DL (ref 0–0.3)
BILIRUB SERPL-MCNC: 0.3 MG/DL
BILIRUB UR QL STRIP: NEGATIVE
BUN SERPL-MCNC: 38.9 MG/DL (ref 8–23)
CALCIUM SERPL-MCNC: 9.1 MG/DL (ref 8.8–10.4)
CHLORIDE SERPL-SCNC: 96 MMOL/L (ref 98–107)
COLOR UR AUTO: ABNORMAL
CREAT SERPL-MCNC: 1.3 MG/DL (ref 0.67–1.17)
DIASTOLIC BLOOD PRESSURE - MUSE: 78 MMHG
EGFRCR SERPLBLD CKD-EPI 2021: 56 ML/MIN/1.73M2
EOSINOPHIL # BLD AUTO: 0.1 10E3/UL (ref 0–0.7)
EOSINOPHIL NFR BLD AUTO: 1 %
ERYTHROCYTE [DISTWIDTH] IN BLOOD BY AUTOMATED COUNT: 18.9 % (ref 10–15)
GLUCOSE BLDC GLUCOMTR-MCNC: 359 MG/DL (ref 70–99)
GLUCOSE BLDC GLUCOMTR-MCNC: 380 MG/DL (ref 70–99)
GLUCOSE BLDC GLUCOMTR-MCNC: 457 MG/DL (ref 70–99)
GLUCOSE SERPL-MCNC: 532 MG/DL (ref 70–99)
GLUCOSE UR STRIP-MCNC: >1000 MG/DL
HCO3 BLDV-SCNC: 25 MMOL/L (ref 21–28)
HCO3 SERPL-SCNC: 21 MMOL/L (ref 22–29)
HCT VFR BLD AUTO: 32.4 % (ref 40–53)
HGB BLD-MCNC: 10.3 G/DL (ref 13.3–17.7)
HGB UR QL STRIP: NEGATIVE
HOLD SPECIMEN: NORMAL
HOLD SPECIMEN: NORMAL
IMM GRANULOCYTES # BLD: 0.1 10E3/UL
IMM GRANULOCYTES NFR BLD: 1 %
INTERPRETATION ECG - MUSE: NORMAL
KETONES UR STRIP-MCNC: NEGATIVE MG/DL
LEUKOCYTE ESTERASE UR QL STRIP: NEGATIVE
LYMPHOCYTES # BLD AUTO: 1.7 10E3/UL (ref 0.8–5.3)
LYMPHOCYTES NFR BLD AUTO: 20 %
MCH RBC QN AUTO: 28 PG (ref 26.5–33)
MCHC RBC AUTO-ENTMCNC: 31.8 G/DL (ref 31.5–36.5)
MCV RBC AUTO: 88 FL (ref 78–100)
MONOCYTES # BLD AUTO: 0.5 10E3/UL (ref 0–1.3)
MONOCYTES NFR BLD AUTO: 7 %
NEUTROPHILS # BLD AUTO: 5.8 10E3/UL (ref 1.6–8.3)
NEUTROPHILS NFR BLD AUTO: 70 %
NITRATE UR QL: NEGATIVE
NRBC # BLD AUTO: 0 10E3/UL
NRBC BLD AUTO-RTO: 0 /100
O2/TOTAL GAS SETTING VFR VENT: 21 %
OXYHGB MFR BLDV: 45 % (ref 70–75)
P AXIS - MUSE: 52 DEGREES
PCO2 BLDV: 33 MM HG (ref 40–50)
PH BLDV: 7.49 [PH] (ref 7.32–7.43)
PH UR STRIP: 7 [PH] (ref 5–7)
PLATELET # BLD AUTO: 259 10E3/UL (ref 150–450)
PO2 BLDV: 24 MM HG (ref 25–47)
POTASSIUM SERPL-SCNC: 4.2 MMOL/L (ref 3.4–5.3)
PR INTERVAL - MUSE: 212 MS
PROT SERPL-MCNC: 7 G/DL (ref 6.4–8.3)
QRS DURATION - MUSE: 106 MS
QT - MUSE: 404 MS
QTC - MUSE: 463 MS
R AXIS - MUSE: -41 DEGREES
RBC # BLD AUTO: 3.68 10E6/UL (ref 4.4–5.9)
RBC URINE: <1 /HPF
SAO2 % BLDV: 46.2 % (ref 70–75)
SODIUM SERPL-SCNC: 133 MMOL/L (ref 135–145)
SP GR UR STRIP: 1.01 (ref 1–1.03)
SYSTOLIC BLOOD PRESSURE - MUSE: 165 MMHG
T AXIS - MUSE: 99 DEGREES
TROPONIN T SERPL HS-MCNC: 32 NG/L
TROPONIN T SERPL HS-MCNC: 32 NG/L
UROBILINOGEN UR STRIP-MCNC: <2 MG/DL
VENTRICULAR RATE- MUSE: 79 BPM
WBC # BLD AUTO: 8.2 10E3/UL (ref 4–11)
WBC URINE: <1 /HPF

## 2025-01-17 PROCEDURE — 80076 HEPATIC FUNCTION PANEL: CPT | Performed by: STUDENT IN AN ORGANIZED HEALTH CARE EDUCATION/TRAINING PROGRAM

## 2025-01-17 PROCEDURE — 82565 ASSAY OF CREATININE: CPT | Performed by: STUDENT IN AN ORGANIZED HEALTH CARE EDUCATION/TRAINING PROGRAM

## 2025-01-17 PROCEDURE — 84155 ASSAY OF PROTEIN SERUM: CPT | Performed by: STUDENT IN AN ORGANIZED HEALTH CARE EDUCATION/TRAINING PROGRAM

## 2025-01-17 PROCEDURE — 84484 ASSAY OF TROPONIN QUANT: CPT | Performed by: STUDENT IN AN ORGANIZED HEALTH CARE EDUCATION/TRAINING PROGRAM

## 2025-01-17 PROCEDURE — 82805 BLOOD GASES W/O2 SATURATION: CPT | Performed by: STUDENT IN AN ORGANIZED HEALTH CARE EDUCATION/TRAINING PROGRAM

## 2025-01-17 PROCEDURE — 82435 ASSAY OF BLOOD CHLORIDE: CPT | Performed by: STUDENT IN AN ORGANIZED HEALTH CARE EDUCATION/TRAINING PROGRAM

## 2025-01-17 PROCEDURE — 81001 URINALYSIS AUTO W/SCOPE: CPT | Performed by: STUDENT IN AN ORGANIZED HEALTH CARE EDUCATION/TRAINING PROGRAM

## 2025-01-17 PROCEDURE — 82962 GLUCOSE BLOOD TEST: CPT

## 2025-01-17 PROCEDURE — 85004 AUTOMATED DIFF WBC COUNT: CPT | Performed by: STUDENT IN AN ORGANIZED HEALTH CARE EDUCATION/TRAINING PROGRAM

## 2025-01-17 PROCEDURE — 96360 HYDRATION IV INFUSION INIT: CPT

## 2025-01-17 PROCEDURE — 93005 ELECTROCARDIOGRAM TRACING: CPT | Performed by: STUDENT IN AN ORGANIZED HEALTH CARE EDUCATION/TRAINING PROGRAM

## 2025-01-17 PROCEDURE — 99284 EMERGENCY DEPT VISIT MOD MDM: CPT

## 2025-01-17 PROCEDURE — 250N000012 HC RX MED GY IP 250 OP 636 PS 637: Performed by: STUDENT IN AN ORGANIZED HEALTH CARE EDUCATION/TRAINING PROGRAM

## 2025-01-17 PROCEDURE — 85041 AUTOMATED RBC COUNT: CPT | Performed by: STUDENT IN AN ORGANIZED HEALTH CARE EDUCATION/TRAINING PROGRAM

## 2025-01-17 PROCEDURE — 258N000003 HC RX IP 258 OP 636: Performed by: STUDENT IN AN ORGANIZED HEALTH CARE EDUCATION/TRAINING PROGRAM

## 2025-01-17 PROCEDURE — 96361 HYDRATE IV INFUSION ADD-ON: CPT

## 2025-01-17 PROCEDURE — 36415 COLL VENOUS BLD VENIPUNCTURE: CPT | Performed by: STUDENT IN AN ORGANIZED HEALTH CARE EDUCATION/TRAINING PROGRAM

## 2025-01-17 RX ADMIN — INSULIN ASPART 5 UNITS: 100 INJECTION, SOLUTION INTRAVENOUS; SUBCUTANEOUS at 19:35

## 2025-01-17 RX ADMIN — SODIUM CHLORIDE 1000 ML: 9 INJECTION, SOLUTION INTRAVENOUS at 18:31

## 2025-01-17 ASSESSMENT — ACTIVITIES OF DAILY LIVING (ADL)
ADLS_ACUITY_SCORE: 61
ADLS_ACUITY_SCORE: 63
ADLS_ACUITY_SCORE: 63

## 2025-01-17 NOTE — ED TRIAGE NOTES
"Patient presents to ED from [facility] for c/o frequent urination and hyperglycemia. EMS BG read \"high.\" Patient says over the past few weeks he has had several BG readings greater than 500. History of type 2 DM on insulin. Patient also states he feels short of breath but thinks it's due to anxiety. EMS gave 500cc bolus prior to arrival. Alert and oriented x4.     Triage Assessment (Adult)       Row Name 01/17/25 0050          Triage Assessment    Airway WDL WDL        Respiratory WDL    Respiratory WDL X;all     Rhythm/Pattern, Respiratory shortness of breath;tachypneic        Skin Circulation/Temperature WDL    Skin Circulation/Temperature WDL WDL        Cardiac WDL    Cardiac WDL WDL        Peripheral/Neurovascular WDL    Peripheral Neurovascular WDL WDL        Cognitive/Neuro/Behavioral WDL    Cognitive/Neuro/Behavioral WDL WDL                     "

## 2025-01-17 NOTE — ED PROVIDER NOTES
EMERGENCY DEPARTMENT ENCOUNTER      NAME: Lance Ibrahim  AGE: 79 year old male  YOB: 1945  MRN: [id number]  EVALUATION DATE & TIME: 1/17/2025  4:27 PM    PCP: Rickey Adamson    ED PROVIDER: Jeffery Nassar MD      Chief Complaint   Patient presents with    Hyperglycemia         FINAL IMPRESSION:  1. Hyperglycemia          ED COURSE & MEDICAL DECISION MAKING:    Pertinent Labs & Imaging studies reviewed. (See chart for details)  79 year old male presents to the Emergency Department for evaluation of high blood sugar  ED Course as of 01/19/25 2316 Fri Jan 17, 2025   1711 Patient is a 79-year-old male with history of diabetes, cardiomyopathy, CKD Emergency Department for evaluation of hyperglycemia and increased urinary frequency. Patient states he been receiving his insulin at his facility although is not certain the dosage.  The medical record shows he should be on 14 units of Lantus twice daily as well as glipizide.  He states that over the past week or 2 he has been having persistently elevated blood sugars whereas normally his blood sugars are in the range of 200 or so.  Denies any fevers at home.  No new nausea or vomiting.  denies abdominal pain.  Does note some intermittent shortness of breath but he thinks may be related to anxiety from his recent health issues.  Denies any chest pain.  Notes polyuria but no hematuria or dysuria.  No diarrhea.    On exam patient is well-appearing no acute distress.  Breathing, saturating well on room air.  Lungs are clear without any wheezes or rales.  Heart regular rate and rhythm.  Warm well-perfused remedies, abdomen soft nontender, minimally distended but no guarding or rebound.  Moist mucous membranes.    Will obtain laboratory workup to evaluate for significant early metabolic derangement or anxiousness with DKA as well as cardiac workup and infectious workup to evaluate for possible underlying etiology to cause persistently elevated glucose.   1711 EKG shows  a sinus rhythm with first-degree block rate 79 beats a minute, left axis deviation,  ms, QTc 463 ms, no ST elevation, nonspecific ST abnormality, fair amount of baseline artifact but overall EKG appears similar to prior.   2120 Labs reviewed interpreted myself.  CBC does not show any significant leukocytosis     Labs reviewed interpreted myself.  Lab work.  Does not medicate diabetic ketoacidosis as patient is actually slightly alkalotic but  essentially normal bicarb.  Troponin stable and EKG reassuring elicitation for ACS provoking factor of his hyperglycemia.  Otherwise no localizing signs of infection.    Upon repeat evaluation patient's well-appearing.  His glucose is improving after 1 L of crystalloid and 5 units of subcu insulin.    Did discuss with the patient he would likely be placed on short acting insulin in addition to his long-acting insulin and then we will slowly uptitrate his insulin glargine at this time  until he is able to have further adjustments by his primary care doctor.    Patient discharged stable condition.    5:37 PM I met and evaluated the patient.       Medical Decision Making  Obtained supplemental history:Supplemental history obtained?: No  Reviewed external records: External records reviewed?: Other: Heart Care visit 1/16/2025  Care impacted by chronic illness:Chronic Kidney Disease, Diabetes, Hypertension, and Peripheral Vascular Disease  Did you consider but not order tests?: Work up considered but not performed and documented in chart, if applicable  Did you interpret images independently?: Independent interpretation of ECG and images noted in documentation, when applicable.  Consultation discussion with other provider:Did you involve another provider (consultant, MH, pharmacy, etc.)?: No  Discharge. No recommendations on prescription strength medication(s). I considered admission, but discharged patient after significant clinical improvement.    MIPS: Not  Applicable      At the conclusion of the encounter I discussed the results of all of the tests and the disposition. The questions were answered. The patient or family acknowledged understanding and was agreeable with the care plan.     0 minutes of critical care time     MEDICATIONS GIVEN IN THE EMERGENCY:  Medications   sodium chloride 0.9% BOLUS 1,000 mL (0 mLs Intravenous Stopped 1/17/25 2033)   insulin aspart (NovoLOG) injection (RAPID ACTING) (5 Units Subcutaneous $Given 1/17/25 1935)       NEW PRESCRIPTIONS STARTED AT TODAY'S ER VISIT  Discharge Medication List as of 1/17/2025 10:15 PM             =================================================================    HPI    Patient information was obtained from: patient     Use of : N/A       Lance Ibrahim is a 79 year old male with a pertinent history of type II diabetes, HLD, cardiomyopathy, PVD, HTN, CVA, BPH, CKD, who presents to this ED by EMS for evaluation of hyperglycemia.     The patient reports that for the past two weeks his blood sugar levels have been in the 450-500s. Due to this, he said he is urinating every 20 minutes and this is concerning him. He said he takes insulin, but does not know when he last did. He took 14 mg of lantis after he ate lunch today, which he normally does if his sugars are in the 200s. During the same time frame, he said his blood pressure has been elevated as well. Patient noted that he has dealt with various medical issues the past few months and is feeling scared and anxious. He has some chest pain but attributed this to anxiety.     Patient denies abdominal pain, nausea, fevers, vomiting, dysuria or hematuria. No cough, sore throat, or runny nose.     1/16/2025- Heart care visit for follow up. Patient underwent three-vessel coronary artery bypass graft surgery on 11/18/2024. This is stable. Patient instructed to continue use of lisinopril, but increase to 30 mg daily. Continue to use bumetanide, spironolactone  and metoprolol as prescribed. His blood pressure was 124/72 mmHG during visit. Patient with plan to obtain cardiac MRI in 6-8 weeks.     REVIEW OF SYSTEMS   Refer to the HPI    PAST MEDICAL HISTORY:  Past Medical History:   Diagnosis Date    Anemia     Arthritis     osteoarthritis knees    BPH     CAD (coronary artery disease)     subtotal occlusion in the small distal LAD     Cardiomyopathy     Cerebral artery occlusion with cerebral infarction     TIA 1993, no residual    Cervicalgia     CHF (congestive heart failure) (H)     Chronic kidney disease     Chronic low back pain     Chronic rhinitis     Gouty arthropathy     hands    Hyperlipidemia     Hypertension     Kidney stone     Nocturia     Obesity     Osteomyelitis (H) 08/2023    Foot    PVD (peripheral vascular disease)     Sleep apnea     doesn't use cpap    Spinal cord stimulator status 04/2024    Spinal cord stimulator in place    TIA (transient ischaemic attack) 1993    Type 2 diabetes mellitus - 11/08/2018    Ureteral stone        PAST SURGICAL HISTORY:  Past Surgical History:   Procedure Laterality Date    AMPUTATE FOOT Right 08/07/2023    Procedure: AMPUTATION, right hallux;  Surgeon: Nakul Salazar DPM;  [place] [facility] [place]    AMPUTATE TOE(S) Left 10/15/2018    Procedure: AMPUTATE TOE(S);  Left third toe amputation;  Surgeon: Ayaka Azevedo DPM, Podiatry/Foot and Ankle Surgery;  Location:  OR    ARTHROPLASTY KNEE Right 12/07/2018    Procedure: Right total knee arthroplasty;  Surgeon: Issa Cunha MD;  Location:  OR    COLONOSCOPY  03/09/2013    Procedure: COLONOSCOPY;  COLONOSCOPY;  Surgeon: Chau Hogan MD;  Location: [facility]    CORONARY ARTERY BYPASS GRAFT, WITH ENDOSCOPIC VESSEL PROCUREMENT N/A 11/18/2024    Procedure: CORONARY ARTERY BYPASS GRAFT TIMES THREE, WITH LEFT INTERNAL MAMARARY ARTERY HARVEST, LEFT ENDOSCOPIC VESSEL PROCUREMENT, EPIAORTIC ULTRASOUND;  Surgeon: Tsering Evans MD;  [facility]  [place]    CV CORONARY ANGIOGRAM N/A 11/14/2024    Procedure: Coronary Angiogram;  Surgeon: Talon Lew MD;  Location: [facility] CV    CV HEART CATHETERIZATION WITH POSSIBLE INTERVENTION Left 03/05/2019    Procedure: Coronary Angiogram;  Surgeon: Nas Linda MD;  Location:  HEART CARDIAC CATH LAB    CV INTRA AORTIC BALLOON N/A 11/16/2024    Procedure: Intra aortic Balloon Pump Insertion;  Surgeon: Jessica Yepez MD;  Location: [facility] CV    CV INTRAVASULAR ULTRASOUND N/A 11/14/2024    Procedure: Intravascular Ultrasound;  Surgeon: Talon Lew MD;  Location: [facility] CV    CV LEFT HEART CATH N/A 11/14/2024    Procedure: Left Heart Catheterization;  Surgeon: Talon Lew MD;  Location: [facility]    Diastasis recti repair  1985    ENDOSCOPIC RETROGRADE CHOLANGIOPANCREATOGRAM N/A 04/30/2024    Procedure: ENDOSCOPIC RETROGRADE CHOLANGIOPANCREATOGRAPHY, BILIARY SPHINCTEROTOMY, DILATION, BRUSHING, BIOPSIES with DISTAL EXTRA HEPATIC BILE DUCT STRICTURE;  Surgeon: Aldo Gongora MD;  Location: [facility] OR    ESOPHAGOSCOPY, GASTROSCOPY, DUODENOSCOPY (EGD), COMBINED N/A 04/30/2024    Procedure: ESOPHAGOGASTRODUODENOSCOPY, WITH ENDOSCOPIC ULTRASOUND GUIDANCE;  Surgeon: Aldo Gongora MD;  Location: [facility] OR    EXTRACAPSULAR CATARACT EXTRATION WITH INTRAOCULAR LENS IMPLANT Left 03/13/2017    EXTRACAPSULAR CATARACT EXTRATION WITH INTRAOCULAR LENS IMPLANT Right     FOOT SURGERY  04/2013    cyst removal     HERNIA REPAIR  07/13/2004    ventral     IR DISC ASPIRATION INJECTION  6/19/2024    IRRIGATION AND DEBRIDEMENT SHOULDER, COMBINED Right 8/7/2024    Procedure: IRRIGATION AND DEBRIDEMENT, SHOULDER;  Surgeon: Terence Hanson MD;  Location: [facility] OR    IRRIGATION AND DEBRIDEMENT UPPER EXTREMITY, COMBINED Right 8/7/2024    Procedure: IRRIGATION AND DEBRIDEMENT, ELBOW AND THUMB;  Surgeon: Terence Hanson MD;  Location:  [facility]    ORIF Shoulder Left     PROSTATE SURGERY  02/2024    Urolift    RESECT CLAVICLE DISTAL Right 8/7/2024    Procedure: EXCISION, CLAVICLE, DISTAL;  Surgeon: Terence aHnson MD;  Location: [facility] OR    SPINAL CORD STIMULATOR IMPLANT  04/03/2024    TRANSESOPHAGEAL ECHOCARDIOGRAM INTRAOPERATIVE N/A 11/18/2024    Procedure: ECHOCARDIOGRAM, TRANSESOPHAGEAL, INTRAOPERATIVE;  Surgeon: Tsering Evans MD;  Location: [facility] OR    Ventral hernia repair NOS  1987           CURRENT MEDICATIONS:    acetaminophen (TYLENOL) 500 MG tablet  allopurinol (ZYLOPRIM) 100 MG tablet  aspirin (ASA) 81 MG chewable tablet  atorvastatin (LIPITOR) 40 MG tablet  blood glucose monitoring (NO BRAND SPECIFIED) meter device kit  bumetanide (BUMEX) 1 MG tablet  calcium carbonate (TUMS) 500 MG chewable tablet  colchicine (COLCRYS) 0.6 MG tablet  Continuous Glucose Sensor (DEXCOM G7 SENSOR) MISC  diclofenac (VOLTAREN) 1 % topical gel  Ferrous Gluconate 324 (37.5 Fe) MG TABS  finasteride (PROSCAR) 5 MG tablet  fluticasone (FLONASE) 50 MCG/ACT nasal spray  gabapentin (NEURONTIN) 300 MG capsule  glipiZIDE (GLUCOTROL XL) 2.5 MG 24 hr tablet  insulin glargine (LANTUS PEN) 100 UNIT/ML pen  lisinopril (ZESTRIL) 30 MG tablet  metFORMIN (GLUCOPHAGE XR) 500 MG 24 hr tablet  metoprolol succinate ER (TOPROL XL) 100 MG 24 hr tablet  nitroFURantoin macrocrystal-monohydrate (MACROBID) 100 MG capsule  pantoprazole (PROTONIX) 40 MG EC tablet  sodium bicarbonate 650 MG tablet  spironolactone (ALDACTONE) 25 MG tablet  traZODone (DESYREL) 50 MG tablet  ursodiol (ACTIGALL) 300 MG capsule  White Petrolatum-Mineral Oil (LUBRIFRESH P.M.) OINT        ALLERGIES:  Allergies   Allergen Reactions    Ancef [Cefazolin] Rash    Cephalexin Itching and Rash     Allergic reaction required hospitalization 4/2024    Penicillins Anaphylaxis    Other Drug Allergy (See Comments) Unknown    Sulfa Antibiotics      \"itchy rash, swelling of face " "and hands\"       FAMILY HISTORY:  Family History   Problem Relation Age of Onset    Family History Negative Mother          88 yo    Cancer Father          74 yo brain    Diabetes Maternal Grandfather          89 yo    Alcohol/Drug Paternal Grandfather             Colon Cancer No family hx of        SOCIAL HISTORY:   Social History     Socioeconomic History    Marital status:    Occupational History     Employer: SELF   Tobacco Use    Smoking status: Former     Current packs/day: 0.00     Types: Cigarettes     Quit date: 3/17/1993     Years since quittin.8     Passive exposure: Never    Smokeless tobacco: Never   Vaping Use    Vaping status: Never Used   Substance and Sexual Activity    Alcohol use: Not Currently    Drug use: No    Sexual activity: Never     Social Drivers of Health     Financial Resource Strain: Low Risk  (11/15/2024)    Financial Resource Strain     Within the past 12 months, have you or your family members you live with been unable to get utilities (heat, electricity) when it was really needed?: No   Food Insecurity: Low Risk  (11/15/2024)    Food Insecurity     Within the past 12 months, did you worry that your food would run out before you got money to buy more?: No     Within the past 12 months, did the food you bought just not last and you didn t have money to get more?: No   Transportation Needs: Low Risk  (11/15/2024)    Transportation Needs     Within the past 12 months, has lack of transportation kept you from medical appointments, getting your medicines, non-medical meetings or appointments, work, or from getting things that you need?: No   Interpersonal Safety: Low Risk  (2024)    Interpersonal Safety     Do you feel physically and emotionally safe where you currently live?: Yes     Within the past 12 months, have you been hit, slapped, kicked or otherwise physically hurt by someone?: No     Within the past 12 months, have you been " humiliated or emotionally abused in other ways by your partner or ex-partner?: No   Housing Stability: Low Risk  (11/15/2024)    Housing Stability     Do you have housing? : Yes     Are you worried about losing your housing?: No       VITALS:  /89   Pulse 71   Temp 97.8  F (36.6  C) (Oral)   Resp 14   Ht 1.829 m (6')   Wt 87.1 kg (192 lb)   SpO2 100%   BMI 26.04 kg/m      PHYSICAL EXAM    Constitutional: Well developed, Well nourished, NAD,  HENT: Normocephalic, Atraumatic,  mucous membranes moist,   Neck- trachea midline, No stridor.    Eyes:EOMI, Conjunctiva normal, No discharge.   Respiratory: Normal breath sounds, No respiratory distress, No wheezing.    Cardiovascular: Normal heart rate, Regular rhythm,  No murmurs,   Abdominal: Soft, No tenderness, No rebound or guarding.   Laparoscopic surgical scars  Musculoskeletal: no deformity or malalignment   Integument: Warm, Dry, No erythema,    Neurologic: Alert & oriented x 3   Psychiatric: Affect normal, Cooperative.      LAB:  All pertinent labs reviewed and interpreted.  Results for orders placed or performed during the hospital encounter of 01/17/25   Basic metabolic panel   Result Value Ref Range    Sodium 133 (L) 135 - 145 mmol/L    Potassium 4.2 3.4 - 5.3 mmol/L    Chloride 96 (L) 98 - 107 mmol/L    Carbon Dioxide (CO2) 21 (L) 22 - 29 mmol/L    Anion Gap 16 (H) 7 - 15 mmol/L    Urea Nitrogen 38.9 (H) 8.0 - 23.0 mg/dL    Creatinine 1.30 (H) 0.67 - 1.17 mg/dL    GFR Estimate 56 (L) >60 mL/min/1.73m2    Calcium 9.1 8.8 - 10.4 mg/dL    Glucose 532 (HH) 70 - 99 mg/dL   Hepatic function panel   Result Value Ref Range    Protein Total 7.0 6.4 - 8.3 g/dL    Albumin 3.6 3.5 - 5.2 g/dL    Bilirubin Total 0.3 <=1.2 mg/dL    Alkaline Phosphatase 181 (H) 40 - 150 U/L    AST 18 0 - 45 U/L    ALT 12 0 - 70 U/L    Bilirubin Direct <0.20 0.00 - 0.30 mg/dL   Blood gas venous   Result Value Ref Range    pH Venous 7.49 (H) 7.32 - 7.43    pCO2 Venous 33 (L) 40 - 50  mm Hg    pO2 Venous 24 (L) 25 - 47 mm Hg    Bicarbonate Venous 25 21 - 28 mmol/L    Base Excess/Deficit Venous 1.8 -3.0 - 3.0 mmol/L    FIO2 21     Oxyhemoglobin Venous 45 (L) 70 - 75 %    O2 Sat, Venous 46.2 (L) 70.0 - 75.0 %   UA with Microscopic reflex to Culture    Specimen: Urine, NOS   Result Value Ref Range    Color Urine Light Yellow Colorless, Straw, Light Yellow, Yellow    Appearance Urine Clear Clear    Glucose Urine >1000 (A) Negative mg/dL    Bilirubin Urine Negative Negative    Ketones Urine Negative Negative mg/dL    Specific Gravity Urine 1.011 1.001 - 1.030    Blood Urine Negative Negative    pH Urine 7.0 5.0 - 7.0    Protein Albumin Urine 50 (A) Negative mg/dL    Urobilinogen Urine <2.0 <2.0 mg/dL    Nitrite Urine Negative Negative    Leukocyte Esterase Urine Negative Negative    Bacteria Urine Few (A) None Seen /HPF    Amorphous Crystals Urine Few (A) None Seen /HPF    RBC Urine <1 <=2 /HPF    WBC Urine <1 <=5 /HPF   Glucose by meter   Result Value Ref Range    GLUCOSE BY METER POCT 457 (H) 70 - 99 mg/dL   Result Value Ref Range    Troponin T, High Sensitivity 32 (H) <=22 ng/L   Extra Blue Top Tube   Result Value Ref Range    Hold Specimen JIC    Extra Red Top Tube   Result Value Ref Range    Hold Specimen JIC    CBC with platelets and differential   Result Value Ref Range    WBC Count 8.2 4.0 - 11.0 10e3/uL    RBC Count 3.68 (L) 4.40 - 5.90 10e6/uL    Hemoglobin 10.3 (L) 13.3 - 17.7 g/dL    Hematocrit 32.4 (L) 40.0 - 53.0 %    MCV 88 78 - 100 fL    MCH 28.0 26.5 - 33.0 pg    MCHC 31.8 31.5 - 36.5 g/dL    RDW 18.9 (H) 10.0 - 15.0 %    Platelet Count 259 150 - 450 10e3/uL    % Neutrophils 70 %    % Lymphocytes 20 %    % Monocytes 7 %    % Eosinophils 1 %    % Basophils 1 %    % Immature Granulocytes 1 %    NRBCs per 100 WBC 0 <1 /100    Absolute Neutrophils 5.8 1.6 - 8.3 10e3/uL    Absolute Lymphocytes 1.7 0.8 - 5.3 10e3/uL    Absolute Monocytes 0.5 0.0 - 1.3 10e3/uL    Absolute Eosinophils 0.1 0.0  - 0.7 10e3/uL    Absolute Basophils 0.0 0.0 - 0.2 10e3/uL    Absolute Immature Granulocytes 0.1 <=0.4 10e3/uL    Absolute NRBCs 0.0 10e3/uL   Result Value Ref Range    Troponin T, High Sensitivity 32 (H) <=22 ng/L   Glucose by meter   Result Value Ref Range    GLUCOSE BY METER POCT 380 (H) 70 - 99 mg/dL   Glucose by meter   Result Value Ref Range    GLUCOSE BY METER POCT 359 (H) 70 - 99 mg/dL   ECG 12-LEAD WITH MUSE (LHE)   Result Value Ref Range    Systolic Blood Pressure 165 mmHg    Diastolic Blood Pressure 78 mmHg    Ventricular Rate 79 BPM    Atrial Rate 79 BPM    OR Interval 212 ms    QRS Duration 106 ms     ms    QTc 463 ms    P Axis 52 degrees    R AXIS -41 degrees    T Axis 99 degrees    Interpretation ECG       Sinus rhythm with 1st degree A-V block with Premature supraventricular complexes  Left axis deviation  Incomplete left bundle branch block  Nonspecific ST and T wave abnormality  Abnormal ECG  When compared with ECG of 06-Jan-2025 08:50,  Premature supraventricular complexes are now Present  Confirmed by SEE ED PROVIDER NOTE FOR, ECG INTERPRETATION ([id number]),  Rojas Pichardo ([id number]) on 1/17/2025 4:49:48 PM           EKG:    Performed at:     Impression: EKG shows a sinus rhythm with first-degree block rate 79 beats a minute, left axis deviation,  ms, QTc 463 ms, no ST elevation, nonspecific ST abnormality, fair amount of baseline artifact but overall EKG appears similar to prior.        I have independently reviewed and interpreted the EKG(s) documented above.      No Chains System Documentation:   CMS Diagnoses:              I, Marely Lopez, am serving as a scribe to document services personally performed by Jeffery Nassar MD based on my observation and the provider's statements to me. I, Jeffery Nassar MD, attest that Marely Lopez is acting in a scribe capacity, has observed my performance of the services and has documented them in accordance with my direction.    Jeffery Nassar  MD  [facility] EMERGENCY ROOM  [address] [address]  [phone]      Jeffery Nassar MD  01/19/25 3311

## 2025-01-17 NOTE — ED NOTES
Bed: [facility]ED-21  Expected date: 1/17/25  Expected time: 4:25 PM  Means of arrival:   Comments:  [facility]

## 2025-01-18 NOTE — DISCHARGE INSTRUCTIONS
Your blood sugar is high today but there are no significant abnormalities of your electrolytes related to this.  Your urine does not show any signs of infection.  I suspect you need to increase your dose of your insulin.  You are currently just on a long-acting insulin and your primary care doctor may need to add a short acting insulin to go along with meals as well.  In the short-term I would increase your Lantus (insulin glargine) to 18 units twice a day but continue to check your blood sugar 3 times a day and if you have any low blood sugars you should decrease the dose of your insulin back to your previous amount.  Follow-up closely with your primary care doctor and if your blood sugars are persistently elevated will likely need further adjustments to your insulin regimen.

## 2025-01-21 ENCOUNTER — TELEPHONE (OUTPATIENT)
Dept: FAMILY MEDICINE | Facility: CLINIC | Age: 80
End: 2025-01-21
Payer: COMMERCIAL

## 2025-01-21 ENCOUNTER — MEDICAL CORRESPONDENCE (OUTPATIENT)
Dept: HEALTH INFORMATION MANAGEMENT | Facility: CLINIC | Age: 80
End: 2025-01-21
Payer: COMMERCIAL

## 2025-01-21 DIAGNOSIS — Z95.1 S/P CABG X 3: ICD-10-CM

## 2025-01-21 NOTE — TELEPHONE ENCOUNTER
Huddled with Cate Salgado NP. New order placed for increased lantus dosage and sent to [facility]. Cate wynn patient chart will be reviewed by pharmacist to discuss short acting insulin and possible need for additional patient education due to recurrent hyperglycemia and recent hospitalization.    Called [facility] director of nursing and relayed above response from provider. Requests order for lantus be faxed to [phone].  Order printed and faxed.    Brenda Leggett RN  [facility]

## 2025-01-21 NOTE — TELEPHONE ENCOUNTER
Farrah RN from [facility] calling into clinic regarding patient's recent hospitalization and hyperglycemia. No consent to communicate on file, so no protected information provided.    Per Farrah, on 1/17 Lance Ibrahim was sent to ER for symptomatic hyperglycemia. Returned to assisted living facility later that evening. AVS states the patient is currently just on a long acting insulin, PCP may need to add short acting insulin.  AVS instructions state to increase lantus to 18 units 2x/day (Farrah states they have not yet increased lantus. Patient is receiving 14 units 2x/daily)  Farrah will fax over AVS and patient's recent blood sugars for provider review.    Per Farrah, patient not currently symptomatic. VSS during RN visit this morning. States patient feeling well. This morning blood glucose 258.    Farrah is requesting orders for short acting insulin and orders for Lantus increase if needed.    Routing to provider to review and advise next steps.    Brenda Leggett RN  [facility]

## 2025-01-21 NOTE — TELEPHONE ENCOUNTER
Writer faxed lantus insulin order, for patient, to NISA Aburto, at [facility] at [phone].    Apple Ribeiro RN, BSN  [facility]

## 2025-01-22 ENCOUNTER — HOSPITAL ENCOUNTER (EMERGENCY)
Facility: CLINIC | Age: 80
Discharge: ANOTHER HOSPITAL | End: 2025-01-22
Attending: EMERGENCY MEDICINE | Admitting: EMERGENCY MEDICINE
Payer: COMMERCIAL

## 2025-01-22 ENCOUNTER — HOSPITAL ENCOUNTER (INPATIENT)
Facility: HOSPITAL | Age: 80
DRG: 871 | End: 2025-01-22
Attending: INTERNAL MEDICINE | Admitting: INTERNAL MEDICINE
Payer: COMMERCIAL

## 2025-01-22 VITALS
TEMPERATURE: 98 F | WEIGHT: 206 LBS | DIASTOLIC BLOOD PRESSURE: 62 MMHG | SYSTOLIC BLOOD PRESSURE: 122 MMHG | HEIGHT: 72 IN | OXYGEN SATURATION: 97 % | RESPIRATION RATE: 28 BRPM | BODY MASS INDEX: 27.9 KG/M2 | HEART RATE: 90 BPM

## 2025-01-22 DIAGNOSIS — R78.81 BACTEREMIA: ICD-10-CM

## 2025-01-22 DIAGNOSIS — R33.8 URINARY RETENTION DUE TO BENIGN PROSTATIC HYPERPLASIA: ICD-10-CM

## 2025-01-22 DIAGNOSIS — K83.09 CHOLANGITIS (H): Primary | ICD-10-CM

## 2025-01-22 DIAGNOSIS — K83.09 CHOLANGITIS (H): ICD-10-CM

## 2025-01-22 DIAGNOSIS — Z95.1 S/P CABG X 3: ICD-10-CM

## 2025-01-22 DIAGNOSIS — N40.1 URINARY RETENTION DUE TO BENIGN PROSTATIC HYPERPLASIA: ICD-10-CM

## 2025-01-22 DIAGNOSIS — R52 PAIN: ICD-10-CM

## 2025-01-22 LAB
ALBUMIN SERPL BCG-MCNC: 3.4 G/DL (ref 3.5–5.2)
ALBUMIN UR-MCNC: 100 MG/DL
ALP SERPL-CCNC: 356 U/L (ref 40–150)
ALT SERPL W P-5'-P-CCNC: 75 U/L (ref 0–70)
ANION GAP SERPL CALCULATED.3IONS-SCNC: 15 MMOL/L (ref 7–15)
APPEARANCE UR: CLEAR
AST SERPL W P-5'-P-CCNC: 160 U/L (ref 0–45)
ATRIAL RATE - MUSE: 105 BPM
BASE EXCESS BLDV CALC-SCNC: 1.5 MMOL/L (ref -3–3)
BASOPHILS # BLD AUTO: 0 10E3/UL (ref 0–0.2)
BASOPHILS NFR BLD AUTO: 0 %
BILIRUB SERPL-MCNC: 1.8 MG/DL
BILIRUB UR QL STRIP: NEGATIVE
BUN SERPL-MCNC: 29.6 MG/DL (ref 8–23)
CALCIUM SERPL-MCNC: 9.3 MG/DL (ref 8.8–10.4)
CHLORIDE SERPL-SCNC: 100 MMOL/L (ref 98–107)
COLOR UR AUTO: YELLOW
CREAT SERPL-MCNC: 1.18 MG/DL (ref 0.67–1.17)
DIASTOLIC BLOOD PRESSURE - MUSE: 59 MMHG
EGFRCR SERPLBLD CKD-EPI 2021: 62 ML/MIN/1.73M2
EOSINOPHIL # BLD AUTO: 0 10E3/UL (ref 0–0.7)
EOSINOPHIL NFR BLD AUTO: 0 %
ERYTHROCYTE [DISTWIDTH] IN BLOOD BY AUTOMATED COUNT: 19.6 % (ref 10–15)
FLUAV RNA SPEC QL NAA+PROBE: NEGATIVE
FLUBV RNA RESP QL NAA+PROBE: NEGATIVE
GLUCOSE BLDC GLUCOMTR-MCNC: 141 MG/DL (ref 70–99)
GLUCOSE SERPL-MCNC: 178 MG/DL (ref 70–99)
GLUCOSE UR STRIP-MCNC: NEGATIVE MG/DL
HCO3 BLDV-SCNC: 25 MMOL/L (ref 21–28)
HCO3 SERPL-SCNC: 21 MMOL/L (ref 22–29)
HCT VFR BLD AUTO: 33.1 % (ref 40–53)
HGB BLD-MCNC: 10.4 G/DL (ref 13.3–17.7)
HGB UR QL STRIP: NEGATIVE
HOLD SPECIMEN: NORMAL
HYALINE CASTS: 4 /LPF
IMM GRANULOCYTES # BLD: 0 10E3/UL
IMM GRANULOCYTES NFR BLD: 1 %
INTERPRETATION ECG - MUSE: NORMAL
KETONES UR STRIP-MCNC: NEGATIVE MG/DL
LACTATE SERPL-SCNC: 3.8 MMOL/L (ref 0.7–2)
LACTATE SERPL-SCNC: 4 MMOL/L (ref 0.7–2)
LEUKOCYTE ESTERASE UR QL STRIP: NEGATIVE
LYMPHOCYTES # BLD AUTO: 0.1 10E3/UL (ref 0.8–5.3)
LYMPHOCYTES NFR BLD AUTO: 3 %
MCH RBC QN AUTO: 27.8 PG (ref 26.5–33)
MCHC RBC AUTO-ENTMCNC: 31.4 G/DL (ref 31.5–36.5)
MCV RBC AUTO: 89 FL (ref 78–100)
MONOCYTES # BLD AUTO: 0.1 10E3/UL (ref 0–1.3)
MONOCYTES NFR BLD AUTO: 1 %
MUCOUS THREADS #/AREA URNS LPF: PRESENT /LPF
NEUTROPHILS # BLD AUTO: 4.2 10E3/UL (ref 1.6–8.3)
NEUTROPHILS NFR BLD AUTO: 95 %
NITRATE UR QL: NEGATIVE
NRBC # BLD AUTO: 0 10E3/UL
NRBC BLD AUTO-RTO: 0 /100
NT-PROBNP SERPL-MCNC: ABNORMAL PG/ML (ref 0–1800)
O2/TOTAL GAS SETTING VFR VENT: 21 %
OXYHGB MFR BLDV: 68 % (ref 70–75)
P AXIS - MUSE: 64 DEGREES
PCO2 BLDV: 33 MM HG (ref 40–50)
PH BLDV: 7.48 [PH] (ref 7.32–7.43)
PH UR STRIP: 7 [PH] (ref 5–7)
PLATELET # BLD AUTO: 227 10E3/UL (ref 150–450)
PO2 BLDV: 34 MM HG (ref 25–47)
POTASSIUM SERPL-SCNC: 3.8 MMOL/L (ref 3.4–5.3)
PR INTERVAL - MUSE: 196 MS
PROT SERPL-MCNC: 6.8 G/DL (ref 6.4–8.3)
QRS DURATION - MUSE: 108 MS
QT - MUSE: 354 MS
QTC - MUSE: 467 MS
R AXIS - MUSE: -38 DEGREES
RBC # BLD AUTO: 3.74 10E6/UL (ref 4.4–5.9)
RBC URINE: 1 /HPF
RSV RNA SPEC NAA+PROBE: NEGATIVE
SAO2 % BLDV: 68.9 % (ref 70–75)
SARS-COV-2 RNA RESP QL NAA+PROBE: NEGATIVE
SODIUM SERPL-SCNC: 136 MMOL/L (ref 135–145)
SP GR UR STRIP: 1.01 (ref 1–1.03)
SQUAMOUS EPITHELIAL: <1 /HPF
SYSTOLIC BLOOD PRESSURE - MUSE: 128 MMHG
T AXIS - MUSE: 113 DEGREES
TROPONIN T SERPL HS-MCNC: 40 NG/L
TROPONIN T SERPL HS-MCNC: 40 NG/L
UROBILINOGEN UR STRIP-MCNC: <2 MG/DL
VENTRICULAR RATE- MUSE: 105 BPM
WBC # BLD AUTO: 4.4 10E3/UL (ref 4–11)
WBC URINE: 2 /HPF

## 2025-01-22 PROCEDURE — 93005 ELECTROCARDIOGRAM TRACING: CPT | Performed by: EMERGENCY MEDICINE

## 2025-01-22 PROCEDURE — 83605 ASSAY OF LACTIC ACID: CPT | Performed by: EMERGENCY MEDICINE

## 2025-01-22 PROCEDURE — 99418 PROLNG IP/OBS E/M EA 15 MIN: CPT | Performed by: INTERNAL MEDICINE

## 2025-01-22 PROCEDURE — 36415 COLL VENOUS BLD VENIPUNCTURE: CPT | Performed by: EMERGENCY MEDICINE

## 2025-01-22 PROCEDURE — 96366 THER/PROPH/DIAG IV INF ADDON: CPT

## 2025-01-22 PROCEDURE — 82310 ASSAY OF CALCIUM: CPT | Performed by: EMERGENCY MEDICINE

## 2025-01-22 PROCEDURE — 83880 ASSAY OF NATRIURETIC PEPTIDE: CPT | Performed by: EMERGENCY MEDICINE

## 2025-01-22 PROCEDURE — 258N000003 HC RX IP 258 OP 636: Performed by: EMERGENCY MEDICINE

## 2025-01-22 PROCEDURE — 96361 HYDRATE IV INFUSION ADD-ON: CPT

## 2025-01-22 PROCEDURE — 96375 TX/PRO/DX INJ NEW DRUG ADDON: CPT

## 2025-01-22 PROCEDURE — 99222 1ST HOSP IP/OBS MODERATE 55: CPT | Mod: FS | Performed by: SURGERY

## 2025-01-22 PROCEDURE — 96365 THER/PROPH/DIAG IV INF INIT: CPT

## 2025-01-22 PROCEDURE — 99223 1ST HOSP IP/OBS HIGH 75: CPT | Performed by: INTERNAL MEDICINE

## 2025-01-22 PROCEDURE — 87637 SARSCOV2&INF A&B&RSV AMP PRB: CPT | Performed by: EMERGENCY MEDICINE

## 2025-01-22 PROCEDURE — 250N000011 HC RX IP 250 OP 636: Performed by: EMERGENCY MEDICINE

## 2025-01-22 PROCEDURE — 84450 TRANSFERASE (AST) (SGOT): CPT | Performed by: EMERGENCY MEDICINE

## 2025-01-22 PROCEDURE — 82805 BLOOD GASES W/O2 SATURATION: CPT | Performed by: EMERGENCY MEDICINE

## 2025-01-22 PROCEDURE — 85025 COMPLETE CBC W/AUTO DIFF WBC: CPT | Performed by: EMERGENCY MEDICINE

## 2025-01-22 PROCEDURE — 99291 CRITICAL CARE FIRST HOUR: CPT | Mod: 25

## 2025-01-22 PROCEDURE — 99292 CRITICAL CARE ADDL 30 MIN: CPT

## 2025-01-22 PROCEDURE — 84484 ASSAY OF TROPONIN QUANT: CPT | Performed by: EMERGENCY MEDICINE

## 2025-01-22 PROCEDURE — 81001 URINALYSIS AUTO W/SCOPE: CPT | Performed by: EMERGENCY MEDICINE

## 2025-01-22 PROCEDURE — 96367 TX/PROPH/DG ADDL SEQ IV INF: CPT

## 2025-01-22 PROCEDURE — 258N000003 HC RX IP 258 OP 636: Performed by: INTERNAL MEDICINE

## 2025-01-22 PROCEDURE — 87149 DNA/RNA DIRECT PROBE: CPT | Performed by: EMERGENCY MEDICINE

## 2025-01-22 PROCEDURE — 250N000011 HC RX IP 250 OP 636: Performed by: INTERNAL MEDICINE

## 2025-01-22 PROCEDURE — 120N000004 HC R&B MS OVERFLOW

## 2025-01-22 PROCEDURE — 87040 BLOOD CULTURE FOR BACTERIA: CPT | Performed by: EMERGENCY MEDICINE

## 2025-01-22 RX ORDER — SODIUM CHLORIDE 9 MG/ML
INJECTION, SOLUTION INTRAVENOUS CONTINUOUS
Status: DISCONTINUED | OUTPATIENT
Start: 2025-01-22 | End: 2025-01-24

## 2025-01-22 RX ORDER — FLUTICASONE PROPIONATE 50 MCG
2 SPRAY, SUSPENSION (ML) NASAL DAILY
Status: DISCONTINUED | OUTPATIENT
Start: 2025-01-23 | End: 2025-01-23

## 2025-01-22 RX ORDER — METRONIDAZOLE 500 MG/100ML
500 INJECTION, SOLUTION INTRAVENOUS EVERY 8 HOURS
Status: DISCONTINUED | OUTPATIENT
Start: 2025-01-22 | End: 2025-01-22

## 2025-01-22 RX ORDER — METRONIDAZOLE 500 MG/100ML
500 INJECTION, SOLUTION INTRAVENOUS ONCE
Status: COMPLETED | OUTPATIENT
Start: 2025-01-22 | End: 2025-01-22

## 2025-01-22 RX ORDER — HYDROMORPHONE HCL IN WATER/PF 6 MG/30 ML
0.4 PATIENT CONTROLLED ANALGESIA SYRINGE INTRAVENOUS
Status: DISCONTINUED | OUTPATIENT
Start: 2025-01-22 | End: 2025-01-26 | Stop reason: HOSPADM

## 2025-01-22 RX ORDER — NALOXONE HYDROCHLORIDE 0.4 MG/ML
0.2 INJECTION, SOLUTION INTRAMUSCULAR; INTRAVENOUS; SUBCUTANEOUS
Status: DISCONTINUED | OUTPATIENT
Start: 2025-01-22 | End: 2025-01-26 | Stop reason: HOSPADM

## 2025-01-22 RX ORDER — CIPROFLOXACIN 2 MG/ML
400 INJECTION, SOLUTION INTRAVENOUS ONCE
Status: COMPLETED | OUTPATIENT
Start: 2025-01-22 | End: 2025-01-22

## 2025-01-22 RX ORDER — CIPROFLOXACIN 2 MG/ML
400 INJECTION, SOLUTION INTRAVENOUS EVERY 12 HOURS
Status: DISCONTINUED | OUTPATIENT
Start: 2025-01-22 | End: 2025-01-22

## 2025-01-22 RX ORDER — HYDROMORPHONE HCL IN WATER/PF 6 MG/30 ML
0.2 PATIENT CONTROLLED ANALGESIA SYRINGE INTRAVENOUS
Status: DISCONTINUED | OUTPATIENT
Start: 2025-01-22 | End: 2025-01-26 | Stop reason: HOSPADM

## 2025-01-22 RX ORDER — DEXTROSE MONOHYDRATE 25 G/50ML
25-50 INJECTION, SOLUTION INTRAVENOUS
Status: DISCONTINUED | OUTPATIENT
Start: 2025-01-22 | End: 2025-01-26 | Stop reason: HOSPADM

## 2025-01-22 RX ORDER — CALCIUM CARBONATE 500 MG/1
1000 TABLET, CHEWABLE ORAL 4 TIMES DAILY PRN
Status: DISCONTINUED | OUTPATIENT
Start: 2025-01-22 | End: 2025-01-26 | Stop reason: HOSPADM

## 2025-01-22 RX ORDER — METRONIDAZOLE 500 MG/100ML
500 INJECTION, SOLUTION INTRAVENOUS EVERY 12 HOURS
Status: DISCONTINUED | OUTPATIENT
Start: 2025-01-22 | End: 2025-01-22

## 2025-01-22 RX ORDER — DOXYCYCLINE 100 MG/10ML
100 INJECTION, POWDER, LYOPHILIZED, FOR SOLUTION INTRAVENOUS ONCE
Status: COMPLETED | OUTPATIENT
Start: 2025-01-22 | End: 2025-01-22

## 2025-01-22 RX ORDER — LIDOCAINE 40 MG/G
CREAM TOPICAL
Status: DISCONTINUED | OUTPATIENT
Start: 2025-01-22 | End: 2025-01-26 | Stop reason: HOSPADM

## 2025-01-22 RX ORDER — ERTAPENEM 1 G/1
1 INJECTION, POWDER, LYOPHILIZED, FOR SOLUTION INTRAMUSCULAR; INTRAVENOUS EVERY 24 HOURS
Status: DISCONTINUED | OUTPATIENT
Start: 2025-01-22 | End: 2025-01-22

## 2025-01-22 RX ORDER — NICOTINE POLACRILEX 4 MG
15-30 LOZENGE BUCCAL
Status: DISCONTINUED | OUTPATIENT
Start: 2025-01-22 | End: 2025-01-26 | Stop reason: HOSPADM

## 2025-01-22 RX ORDER — ERTAPENEM 1 G/1
1 INJECTION, POWDER, LYOPHILIZED, FOR SOLUTION INTRAMUSCULAR; INTRAVENOUS ONCE
Status: COMPLETED | OUTPATIENT
Start: 2025-01-22 | End: 2025-01-22

## 2025-01-22 RX ORDER — NALOXONE HYDROCHLORIDE 0.4 MG/ML
0.4 INJECTION, SOLUTION INTRAMUSCULAR; INTRAVENOUS; SUBCUTANEOUS
Status: DISCONTINUED | OUTPATIENT
Start: 2025-01-22 | End: 2025-01-26 | Stop reason: HOSPADM

## 2025-01-22 RX ORDER — MORPHINE SULFATE 2 MG/ML
2 INJECTION, SOLUTION INTRAMUSCULAR; INTRAVENOUS
Status: COMPLETED | OUTPATIENT
Start: 2025-01-22 | End: 2025-01-22

## 2025-01-22 RX ORDER — MEROPENEM 1 G/1
1 INJECTION, POWDER, FOR SOLUTION INTRAVENOUS EVERY 8 HOURS
Status: DISCONTINUED | OUTPATIENT
Start: 2025-01-23 | End: 2025-01-26 | Stop reason: HOSPADM

## 2025-01-22 RX ADMIN — DOXYCYCLINE 100 MG: 100 INJECTION, POWDER, LYOPHILIZED, FOR SOLUTION INTRAVENOUS at 13:17

## 2025-01-22 RX ADMIN — SODIUM CHLORIDE 500 ML: 9 INJECTION, SOLUTION INTRAVENOUS at 11:59

## 2025-01-22 RX ADMIN — METRONIDAZOLE 500 MG: 5 INJECTION, SOLUTION INTRAVENOUS at 16:00

## 2025-01-22 RX ADMIN — FAMOTIDINE 20 MG: 10 INJECTION, SOLUTION INTRAVENOUS at 23:37

## 2025-01-22 RX ADMIN — CIPROFLOXACIN 400 MG: 400 INJECTION, SOLUTION INTRAVENOUS at 16:01

## 2025-01-22 RX ADMIN — ERTAPENEM SODIUM 1 G: 1 INJECTION, POWDER, LYOPHILIZED, FOR SOLUTION INTRAMUSCULAR; INTRAVENOUS at 12:04

## 2025-01-22 RX ADMIN — MORPHINE SULFATE 2 MG: 2 INJECTION, SOLUTION INTRAMUSCULAR; INTRAVENOUS at 16:06

## 2025-01-22 RX ADMIN — SODIUM CHLORIDE 500 ML: 9 INJECTION, SOLUTION INTRAVENOUS at 12:15

## 2025-01-22 RX ADMIN — SODIUM CHLORIDE: 9 INJECTION, SOLUTION INTRAVENOUS at 23:36

## 2025-01-22 ASSESSMENT — ACTIVITIES OF DAILY LIVING (ADL)
ADLS_ACUITY_SCORE: 66
ADLS_ACUITY_SCORE: 66
ADLS_ACUITY_SCORE: 49
ADLS_ACUITY_SCORE: 66
ADLS_ACUITY_SCORE: 61
ADLS_ACUITY_SCORE: 63
ADLS_ACUITY_SCORE: 63
ADLS_ACUITY_SCORE: 61
ADLS_ACUITY_SCORE: 61
ADLS_ACUITY_SCORE: 66
ADLS_ACUITY_SCORE: 61
ADLS_ACUITY_SCORE: 66

## 2025-01-22 NOTE — ED NOTES
EMERGENCY DEPARTMENT SIGN OUT NOTE        ED COURSE AND MEDICAL DECISION MAKING  Patient was signed out to me by Dr Alexx Adair at 1535    In brief, Lance Ibrahim is a 79 year old male who initially presented for shortness of breath.  Workup in the emergency department today concerning for cholangitis.  after discussed with surgery and GIs determined that patient needs to go to a facility that has ERCP capabilities.  He was started on empiric antibiotics in the emergency department in the meantime.    At time of sign out, disposition was pending placement at the facility with ERCP capabilities.      Patient did translate develop an O2 requirement 2 L nasal cannula here.  Upon repeat evaluation patient is denying any significant shortness of breath.  Do not appreciate any rails on his pulmonary exam but want to keep a close eye on fluid status given his underlying heart failure.  On repeat exam patient continues to rest comfortably.  Denying any dyspnea.  He is now saturating at 93% on room air.  No hypotension.    After bed searching and system [facility] did have a bed available for the patient.  Discussed with hospitalist at [facility] agrees to set the patient this point in time for ongoing management.  Patient does have the potential for further clinical staging given his numerous underlying medical comorbidities but he is hemodynamically stable at this point in time.  Not requiring any vasoactive medications.  Believe he is safe for a medical telemetry bed at this point.  Patient will be transferred to [facility] for ongoing evaluation and intervention for his cholangitis.    FINAL IMPRESSION    1. Cholangitis (H)        ED MEDS  Medications   ertapenem (INVanz) 1 g vial to attach to  mL bag (0 g Intravenous Stopped 1/22/25 1235)   doxycycline (VIBRAMYCIN) 100 mg vial to attach to  mL bag (0 mg Intravenous Stopped 1/22/25 1294)   sodium chloride 0.9% BOLUS 1,000 mL (0 mLs Intravenous  Stopped 1/22/25 1315)   ciprofloxacin (CIPRO) infusion 400 mg (0 mg Intravenous Stopped 1/22/25 1809)   metroNIDAZOLE (FLAGYL) infusion 500 mg (0 mg Intravenous Stopped 1/22/25 1809)   morphine (PF) injection 2 mg (2 mg Intravenous $Given 1/22/25 1606)       LAB  Labs Ordered and Resulted from Time of ED Arrival to Time of ED Departure   COMPREHENSIVE METABOLIC PANEL - Abnormal       Result Value    Sodium 136      Potassium 3.8      Carbon Dioxide (CO2) 21 (*)     Anion Gap 15      Urea Nitrogen 29.6 (*)     Creatinine 1.18 (*)     GFR Estimate 62      Calcium 9.3      Chloride 100      Glucose 178 (*)     Alkaline Phosphatase 356 (*)      (*)     ALT 75 (*)     Protein Total 6.8      Albumin 3.4 (*)     Bilirubin Total 1.8 (*)    LACTIC ACID WHOLE BLOOD WITH 1X REPEAT IN 2 HR WHEN >2 - Abnormal    Lactic Acid, Initial 4.0 (*)    BLOOD GAS VENOUS - Abnormal    pH Venous 7.48 (*)     pCO2 Venous 33 (*)     pO2 Venous 34      Bicarbonate Venous 25      Base Excess/Deficit Venous 1.5      FIO2 21      Oxyhemoglobin Venous 68 (*)     O2 Sat, Venous 68.9 (*)    TROPONIN T, HIGH SENSITIVITY - Abnormal    Troponin T, High Sensitivity 40 (*)    ROUTINE UA WITH MICROSCOPIC REFLEX TO CULTURE - Abnormal    Color Urine Yellow      Appearance Urine Clear      Glucose Urine Negative      Bilirubin Urine Negative      Ketones Urine Negative      Specific Gravity Urine 1.014      Blood Urine Negative      pH Urine 7.0      Protein Albumin Urine 100 (*)     Urobilinogen Urine <2.0      Nitrite Urine Negative      Leukocyte Esterase Urine Negative      Mucus Urine Present (*)     RBC Urine 1      WBC Urine 2      Squamous Epithelials Urine <1      Hyaline Casts Urine 4 (*)    CBC WITH PLATELETS AND DIFFERENTIAL - Abnormal    WBC Count 4.4      RBC Count 3.74 (*)     Hemoglobin 10.4 (*)     Hematocrit 33.1 (*)     MCV 89      MCH 27.8      MCHC 31.4 (*)     RDW 19.6 (*)     Platelet Count 227      % Neutrophils 95      %  Lymphocytes 3      % Monocytes 1      % Eosinophils 0      % Basophils 0      % Immature Granulocytes 1      NRBCs per 100 WBC 0      Absolute Neutrophils 4.2      Absolute Lymphocytes 0.1 (*)     Absolute Monocytes 0.1      Absolute Eosinophils 0.0      Absolute Basophils 0.0      Absolute Immature Granulocytes 0.0      Absolute NRBCs 0.0     LACTIC ACID WHOLE BLOOD - Abnormal    Lactic Acid 3.8 (*)    TROPONIN T, HIGH SENSITIVITY - Abnormal    Troponin T, High Sensitivity 40 (*)    NT PROBNP INPATIENT - Abnormal    N terminal Pro BNP Inpatient 22,204 (*)    INFLUENZA A/B, RSV AND SARS-COV2 PCR - Normal    Influenza A PCR Negative      Influenza B PCR Negative      RSV PCR Negative      SARS CoV2 PCR Negative     BLOOD CULTURE   BLOOD CULTURE       EKG      RADIOLOGY    US Abdomen Limited (RUQ)   Final Result   IMPRESSION:   1.  Enlarged echogenic liver could represent steatohepatitis.   2.  Nodular surface contour consistent with fibrosis.   3.  Gallbladder wall thickening which could be due to acute or chronic cholecystitis, and cirrhosis.   4.  Bile duct dilation.            XR Chest 2 Views   Final Result   IMPRESSION:       Cardiomegaly with central pulmonary vascular congestion, mid and lower lung predominant mild interstitial edema, and trace bilateral pleural effusions, similar to 1/6/2025. No new airspace opacities. No pneumothorax.      Aortic atherosclerotic calcifications. Unchanged median sternotomy wires, spinal stimulator leads, mediastinal clips, and left humeral screws. An additional electronic device overlying the mediastinum on the lateral view does not have a correlate on the    frontal view and is likely within the overlying structures.          DISCHARGE MEDS  Discharge Medication List as of 1/22/2025  9:51 PM          Jeffery Nassar MD  [facility] EMERGENCY ROOM  [address] [address] [phone]         Jeffery Nassar MD  01/22/25 5994

## 2025-01-22 NOTE — ED NOTES
Sats decreased to 89% on RA. Pt wakes easily. Instructed to take deep breaths, sats remained 89%. Placed pt on oxygen 2L/oxymask, sats increased to 93%.

## 2025-01-22 NOTE — CONSULTS
Gastroenterology Consultation    Patient: Lance Ibrahim   1945  Date of Consult:  1/22/2025  Admission Date/Time: 1/22/2025 11:01 AM  Primary Care Provider:  Rickey Adamson    Requesting Physician: Jeffery Nassar MD    CHIEF COMPLAINT:   Shortness of breath    REASON FOR THE CONSULT: Cholecystitis    HPI:   The patient is a 79 year old White male with a history of coronary disease, CHF, prior TIA, BPH, hypertension, hyperlipidemia, type 2 diabetes mellitus, obesity and chronic back and foot pain.    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  He subsequently, was transferred to [facility], and underwent both an EUS and an ERCP on April 30, 2024.  The EUS was suggestive of narrowing of the mid to lower CBD with thickening of the distal duct along with dilation of the bile duct proximal to the narrowed portion.  Multiple small cysts noted throughout the pancreas.    The EUS was followed by an ERCP which revealed the presence of a single, localized biliary stricture in the lower third of the main bile duct.  There was concern that this was a malignant appearing stricture.  The proximal main bile duct was dilated secondary to the stricture.  A biliary sphincterotomy was performed.  Dilation of the CBD with a 6 Kenyan balloon dilator was successful.  So also cytology were obtained by brushing in this area as well as with biopsies.  A 10 Kenyan by 5 cm temporary stent was placed 5 cm into the CBD.  Path from the bile duct samples, did not show any evidence for malignancy.    He was subsequently recommended a follow-up ERCP in 10 to 12 weeks for stent exchange.    Unfortunately, a few months later, he developed new cardiac issues, osteomyelitis, renal insufficiency, and had prolonged treatment for these issues, and never proceeded with the follow-up ERCP for stent removal.    He now presents from his  care facility, due to shortness of breath.  On arrival he was noted to be tachycardic, but afebrile.  He did endorse some mild right upper quadrant discomfort, but denied any significant abdominal pain.  He also denied any GI bleeding, nausea, vomiting, fever, chills, lightheadedness, recent alteration of bowel habits, anorexia or weight loss.  No dysphagia, odynophagia, heartburn or regurgitation.  He does feel tired.    Labs in the ED did not reveal any leukocytosis, with a WBC count of 4.4, but he was noted to have lactic acidosis with a serum lactic acid level of 4.0.  He was also noted to have elevated liver function test, with a bilirubin of 1.8, alkaline phosphatase 356,  and ALT 75.  These labs were essentially normal 5 days ago when last checked, and at that time his bilirubin was 0.3, alkaline phosphatase was mildly elevated at 181, and his AST was 18 with an ALT of 12.    An ultrasound of the abdomen done revealed appearance of an enlarged echogenic liver, with nodular surface concerning for underlying steatohepatitis or fibrosis.  Gallbladder wall thickening noted.  Bile duct dilation noted, both extra and intrahepatic bile biliary ductal dilation was present, with the CBD measuring 15 mm.    REVIEW OF SYSTEMS:  Review of systems as per HPI, rest essentially negative    PAST MEDICAL HISTORY:  Past Medical History:   Diagnosis Date    Anemia     Arthritis     osteoarthritis knees    BPH     CAD (coronary artery disease)     subtotal occlusion in the small distal LAD     Cardiomyopathy     Cerebral artery occlusion with cerebral infarction     TIA 1993, no residual    Cervicalgia     CHF (congestive heart failure) (H)     Chronic kidney disease     Chronic low back pain     Chronic rhinitis     Gouty arthropathy     hands    Hyperlipidemia     Hypertension     Kidney stone     Nocturia     Obesity     Osteomyelitis (H) 08/2023    Foot    PVD (peripheral vascular disease)     Sleep apnea     doesn't  use cpap    Spinal cord stimulator status 04/2024    Spinal cord stimulator in place    TIA (transient ischaemic attack) 1993    Type 2 diabetes mellitus - 11/08/2018    Ureteral stone        PAST SURGICAL HISTORY:  Past Surgical History:   Procedure Laterality Date    AMPUTATE FOOT Right 08/07/2023    Procedure: AMPUTATION, right hallux;  Surgeon: Nakul Salazar DPM;  Location: [facility] OR    AMPUTATE TOE(S) Left 10/15/2018    Procedure: AMPUTATE TOE(S);  Left third toe amputation;  Surgeon: Ayaka Azevedo DPM, Podiatry/Foot and Ankle Surgery;  Location: [facility] OR    ARTHROPLASTY KNEE Right 12/07/2018    Procedure: Right total knee arthroplasty;  Surgeon: Issa Cunha MD;  Location:  OR    COLONOSCOPY  03/09/2013    Procedure: COLONOSCOPY;  COLONOSCOPY;  Surgeon: Chau Hogan MD;  Location: [facility]    CORONARY ARTERY BYPASS GRAFT, WITH ENDOSCOPIC VESSEL PROCUREMENT N/A 11/18/2024    Procedure: CORONARY ARTERY BYPASS GRAFT TIMES THREE, WITH LEFT INTERNAL MAMARARY ARTERY HARVEST, LEFT ENDOSCOPIC VESSEL PROCUREMENT, EPIAORTIC ULTRASOUND;  Surgeon: Tsering Evans MD;  Location: [facility] OR    CV CORONARY ANGIOGRAM N/A 11/14/2024    Procedure: Coronary Angiogram;  Surgeon: Talon Lew MD;  Location: [facility] CV    CV HEART CATHETERIZATION WITH POSSIBLE INTERVENTION Left 03/05/2019    Procedure: Coronary Angiogram;  Surgeon: Nas Linda MD;  Location: [facility] CARDIAC CATH LAB    CV INTRA AORTIC BALLOON N/A 11/16/2024    Procedure: Intra aortic Balloon Pump Insertion;  Surgeon: Jessica Yepez MD;  Location: [facility] CV    CV INTRAVASULAR ULTRASOUND N/A 11/14/2024    Procedure: Intravascular Ultrasound;  Surgeon: Talon Lew MD;  Location: [facility] CV    CV LEFT HEART CATH N/A 11/14/2024    Procedure: Left Heart Catheterization;  Surgeon: Talon Lew MD;  Location: [facility] CV    Diastasis recti repair  1985    ENDOSCOPIC  RETROGRADE CHOLANGIOPANCREATOGRAM N/A 04/30/2024    Procedure: ENDOSCOPIC RETROGRADE CHOLANGIOPANCREATOGRAPHY, BILIARY SPHINCTEROTOMY, DILATION, BRUSHING, BIOPSIES with DISTAL EXTRA HEPATIC BILE DUCT STRICTURE;  Surgeon: Aldo Gongora MD;  Location: [facility] OR    ESOPHAGOSCOPY, GASTROSCOPY, DUODENOSCOPY (EGD), COMBINED N/A 04/30/2024    Procedure: ESOPHAGOGASTRODUODENOSCOPY, WITH ENDOSCOPIC ULTRASOUND GUIDANCE;  Surgeon: Aldo Gongora MD;  Location: [facility] OR    EXTRACAPSULAR CATARACT EXTRATION WITH INTRAOCULAR LENS IMPLANT Left 03/13/2017    EXTRACAPSULAR CATARACT EXTRATION WITH INTRAOCULAR LENS IMPLANT Right     FOOT SURGERY  04/2013    cyst removal     HERNIA REPAIR  07/13/2004    ventral     IR DISC ASPIRATION INJECTION  6/19/2024    IRRIGATION AND DEBRIDEMENT SHOULDER, COMBINED Right 8/7/2024    Procedure: IRRIGATION AND DEBRIDEMENT, SHOULDER;  Surgeon: Terence Hanson MD;  Location: [facility] OR    IRRIGATION AND DEBRIDEMENT UPPER EXTREMITY, COMBINED Right 8/7/2024    Procedure: IRRIGATION AND DEBRIDEMENT, ELBOW AND THUMB;  Surgeon: Terence Hanson MD;  Location: [facility] OR    ORIF Shoulder Left     PROSTATE SURGERY  02/2024    Urolift    RESECT CLAVICLE DISTAL Right 8/7/2024    Procedure: EXCISION, CLAVICLE, DISTAL;  Surgeon: Terence Hanson MD;  Location: [facility] OR    SPINAL CORD STIMULATOR IMPLANT  04/03/2024    TRANSESOPHAGEAL ECHOCARDIOGRAM INTRAOPERATIVE N/A 11/18/2024    Procedure: ECHOCARDIOGRAM, TRANSESOPHAGEAL, INTRAOPERATIVE;  Surgeon: Tsering Evans MD;  Location: [facility] OR    Ventral hernia repair NOS  1987       MEDICATIONS:  Current Outpatient Medications   Medication Instructions    acetaminophen (TYLENOL) 1,000 mg, 3 TIMES DAILY    allopurinol (ZYLOPRIM) 100 mg, DAILY    aspirin (ASA) 162 mg, Oral, DAILY    atorvastatin (LIPITOR) 40 mg, Oral, EVERY EVENING    blood glucose monitoring (NO BRAND SPECIFIED)  meter device kit Use to test blood sugar 2 times daily or as directed.    bumetanide (BUMEX) 1 mg, Oral, DAILY    calcium carbonate (TUMS) 1,000 mg, 3 TIMES DAILY PRN    Continuous Glucose Sensor (DEXCOM G7 SENSOR) MISC 1 each, Does not apply, EVERY 10 DAYS, Change sensor every 10 days    diclofenac (VOLTAREN) 2 g, 2 TIMES DAILY PRN    Ferrous Gluconate 324 (37.5 Fe) MG TABS 1 tablet, DAILY    finasteride (PROSCAR) 5 mg, DAILY    gabapentin (NEURONTIN) 300 mg, Oral, 3 TIMES DAILY PRN    glipiZIDE (GLUCOTROL XL) 2.5 mg, Oral, DAILY    insulin glargine (LANTUS PEN) 18 Units, 2 TIMES DAILY    lisinopril (ZESTRIL) 30 mg, Oral, DAILY    metFORMIN (GLUCOPHAGE XR) 500 mg, Oral, DAILY WITH SUPPER    metoprolol succinate ER (TOPROL XL) 100 mg, Oral, DAILY    pantoprazole (PROTONIX) 40 mg, Oral, EVERY MORNING BEFORE BREAKFAST    sodium bicarbonate 1,950 mg, Oral, 3 TIMES DAILY    spironolactone (ALDACTONE) 25 mg, Oral, DAILY    traZODone (DESYREL) 25 mg, AT BEDTIME    ursodiol (ACTIGALL) 300 mg, Oral, 2 TIMES DAILY    White Petrolatum-Mineral Oil (LUBRIFRESH P.M.) OINT 1 g, Ophthalmic, 2 TIMES DAILY PRN       ALLERGIES:  Ancef [cefazolin], Cephalexin, Penicillins, Other drug allergy (see comments), and Sulfa antibiotics    FAMILY HISTORY:  Family History   Problem Relation Age of Onset    Family History Negative Mother          86 yo    Cancer Father          74 yo brain    Diabetes Maternal Grandfather          89 yo    Alcohol/Drug Paternal Grandfather             Colon Cancer No family hx of        SOCIAL HISTORY:  Social History     Tobacco Use    Smoking status: Former     Current packs/day: 0.00     Types: Cigarettes     Quit date: 3/17/1993     Years since quittin.8     Passive exposure: Never    Smokeless tobacco: Never   Substance Use Topics    Alcohol use: Not Currently       PHYSICAL EXAM:  /63 (BP Location: Right arm, Patient Position: Semi-Davis's, Cuff Size: Adult  Regular)   Pulse (!) 106   Temp 98  F (36.7  C) (Oral)   Resp 25   Ht 1.829 m (6')   Wt 93.4 kg (206 lb)   SpO2 94%   BMI 27.94 kg/m    Body mass index is 27.94 kg/m .  Constitutional: healthy, alert, and no distress   Cardiovascular: S1, S2 normal  Respiratory: Clear to auscultation  Abdomen: Abdomen soft, nontender, nondistended, no rebound or guarding or rigidity    LABS:  CBC:  Recent Labs   Lab Test 01/22/25  1142   WBC 4.4   RBC 3.74*   HGB 10.4*   HCT 33.1*   MCV 89   MCH 27.8   MCHC 31.4*   RDW 19.6*           BMP:  Recent Labs   Lab 01/22/25  1142 01/17/25 2032 01/17/25  1920 01/17/25  1721     --   --  133*   POTASSIUM 3.8  --   --  4.2   CHLORIDE 100  --   --  96*   CO2 21*  --   --  21*   * 359* 380* 532*   CR 1.18*  --   --  1.30*   BUN 29.6*  --   --  38.9*       Liver/Pancreas:  Recent Labs   Lab 01/22/25  1142 01/17/25  1721   * 18   ALT 75* 12   ALKPHOS 356* 181*   BILITOTAL 1.8* 0.3     Recent Labs   Lab Test 11/29/24  0930   INR 1.23*       IMAGING:    US Abdomen Limited (RUQ)    Result Date: 1/22/2025  EXAM: US ABDOMEN LIMITED LOCATION: [facility] DATE: 1/22/2025 INDICATION: fever. History of biliary stent. COMPARISON: CT 1/6/2025 TECHNIQUE: Limited abdominal ultrasound. FINDINGS: GALLBLADDER: There is gallbladder sludge. No stones seen. There is diffuse gallbladder wall thickening up to 4 mm. Trace pericholecystic fluid. No sonographic Garcia's. BILE DUCTS: Extra and intrahepatic bile duct dilation. The common duct measures up to 15 mm. LIVER: Enlarged measuring 21.3 cm MCL, craniocaudal. Coarsened echotexture, suggesting underlying fibrosis. Nodular contour. Diffuse hepatic fatty infiltration. No focal mass. RIGHT KIDNEY: No hydronephrosis. PANCREAS: Mostly obscured by gas. No ascites.     IMPRESSION: 1.  Enlarged echogenic liver could represent steatohepatitis. 2.  Nodular surface contour consistent with fibrosis. 3.  Gallbladder  wall thickening which could be due to acute or chronic cholecystitis, and cirrhosis. 4.  Bile duct dilation.     XR Chest 2 Views    Result Date: 1/22/2025  EXAM: XR CHEST 2 VIEWS LOCATION: [facility] DATE: 1/22/2025 INDICATION: Shortness of breath. COMPARISON: Chest radiograph 1/6/2025. CT chest 1/6/2025.     IMPRESSION: Cardiomegaly with central pulmonary vascular congestion, mid and lower lung predominant mild interstitial edema, and trace bilateral pleural effusions, similar to 1/6/2025. No new airspace opacities. No pneumothorax. Aortic atherosclerotic calcifications. Unchanged median sternotomy wires, spinal stimulator leads, mediastinal clips, and left humeral screws. An additional electronic device overlying the mediastinum on the lateral view does not have a correlate on the frontal view and is likely within the overlying structures.      ASSESSMENT:   Lance Ibrahim is a 79-year-old gentleman with multiple medical issues, who presents with shortness of breath.    Noted to have lactic acidosis, in the absence of leukocytosis, with elevated LFTs and biliary dilation noted on imaging study.  In view of the fact, that he has previously been documented to have a CBD stricture, requiring previous ERCP and biliary stenting in April 2024, and that he has never followed up for stent removal or exchange, I am concerned, that he may have ongoing biliary obstruction and there is a possibility of cholangitis.    He will certainly benefit from an ERCP, for removal of the previously placed stent, which I am concerned is blocked, and further sphincterotomy and/or stent placement.    Unfortunately, we do not have the capability of doing an ERCP at this hospital, and while he is currently stable, with the concern for possible cholangitis, he will be better managed at a facility that has the ability to do this procedure.  Would recommend transfer to facility such as [facility] [which he would  [facility]] or [facility] or [facility], or if no beds available, outside of the system to [facility] or [facility].  While based upon how he looks currently, he does not need an emergency ERCP right now, he will certainly need this within the next 24-48 hours, and in case, his clinical situation deteriorates overnight, having him at a facility that has the capability of doing an ERCP would be important.    PLAN:  1.  Keep n.p.o., IV fluids  2.  IV antibiotics to cover for gram-negative's and anaerobes - he has already been given ciprofloxacin, ertapenem and metronidazole, which should be adequate.  3.  Avoid anticoagulation or NSAIDs  4.  Repeat LFTs, INR in a.m.  5.  Agree with blood cultures  6.  ERCP likely tomorrow, earlier depending upon clinical status  7.  He will also benefit from better imaging study, specifically a CT scan of the abdomen pelvis, to better evaluate for the stent.  That being said, please do not hold up his transfer just for CT scan, this can be done either here if he is here for few more hours, or at the hospital where he gets transferred to.  Please work on transfer to [facility] if possible.    I have reached out to our biliary provider at [facility] [Dr. Lance Esteban], in case patient gets transferred over there and needs a more urgent procedure this evening.    Thank you for this consultation, we will follow along with you.          Daniel Schmidt MD  Thank you for the opportunity to participate in the care of this patient.   Please feel free to call with any questions or concerns.  [phone].       CC: [facility], Rickey Adamson

## 2025-01-22 NOTE — PHARMACY-ADMISSION MEDICATION HISTORY
Pharmacist Admission Medication History    Admission medication history is complete. The information provided in this note is only as accurate as the sources available at the time of the update.    Information Source(s): Facility ([facility]/) medication list/MAR via N/A    Pertinent Information: None    Changes made to PTA medication list:  Added: None  Deleted: Flonase, colchicine  Changed: None    Allergies reviewed with patient and updates made in EHR: yes    Medication History Completed By: Alma Smallwood [facility] 1/22/2025 3:19 PM    PTA Med List   Medication Sig Last Dose/Taking    acetaminophen (TYLENOL) 500 MG tablet Take 1,000 mg by mouth 3 times daily. TID while awake and PRN nightly 1/22/2025 Morning    allopurinol (ZYLOPRIM) 100 MG tablet Take 100 mg by mouth daily. 1/22/2025 Morning    aspirin (ASA) 81 MG chewable tablet Take 2 tablets (162 mg) by mouth daily. 1/22/2025 Morning    atorvastatin (LIPITOR) 40 MG tablet Take 1 tablet (40 mg) by mouth every evening. 1/21/2025 Bedtime    bumetanide (BUMEX) 1 MG tablet Take 1 tablet (1 mg) by mouth daily. 1/22/2025 Morning    calcium carbonate (TUMS) 500 MG chewable tablet Take 1,000 mg by mouth 3 times daily as needed for heartburn Unknown    diclofenac (VOLTAREN) 1 % topical gel Apply 2 g topically 2 times daily as needed for moderate pain. Unknown    Ferrous Gluconate 324 (37.5 Fe) MG TABS Take 1 tablet by mouth daily. 1/22/2025 Morning    finasteride (PROSCAR) 5 MG tablet Take 5 mg by mouth daily 1/22/2025 Morning    gabapentin (NEURONTIN) 300 MG capsule Take 1 capsule (300 mg) by mouth 3 times daily as needed. Unknown    glipiZIDE (GLUCOTROL XL) 2.5 MG 24 hr tablet Take 1 tablet (2.5 mg) by mouth daily. 1/22/2025 Morning    insulin glargine (LANTUS PEN) 100 UNIT/ML pen Inject 18 Units subcutaneously 2 times daily. 1/22/2025 Morning    lisinopril (ZESTRIL) 30 MG tablet Take 1 tablet (30 mg) by mouth daily. 1/22/2025 Morning    metFORMIN (GLUCOPHAGE XR) 500  MG 24 hr tablet Take 1 tablet (500 mg) by mouth daily (with dinner). 1/21/2025 Evening    metoprolol succinate ER (TOPROL XL) 100 MG 24 hr tablet Take 1 tablet (100 mg) by mouth daily. 1/22/2025 Morning    pantoprazole (PROTONIX) 40 MG EC tablet Take 1 tablet (40 mg) by mouth every morning (before breakfast). 1/22/2025 Morning    sodium bicarbonate 650 MG tablet Take 3 tablets (1,950 mg) by mouth 3 times daily. 1/22/2025 Morning    spironolactone (ALDACTONE) 25 MG tablet Take 1 tablet (25 mg) by mouth daily. 1/22/2025 Morning    traZODone (DESYREL) 50 MG tablet Take 25 mg by mouth at bedtime. 1/21/2025 Bedtime    ursodiol (ACTIGALL) 300 MG capsule Take 1 capsule (300 mg) by mouth 2 times daily 1/22/2025 Morning    White Petrolatum-Mineral Oil (LUBRIFRESH P.M.) OINT Apply 1 g to eye 2 times daily as needed for dry eyes. Unknown

## 2025-01-22 NOTE — ED NOTES
[facility]  Expected date:   Expected time:   Means of arrival: Ambulance  Comments:  [place] 79 M

## 2025-01-22 NOTE — ED NOTES
Unsuccessful attempt to urinate by patient. Unsuccessful straight cath by this RN with prior approval by Dr. Adair with 14F straight tip catheter. Coude osman placed per Dr. Adair. Pt tolerated well reporting pressure relieved.

## 2025-01-22 NOTE — ED TRIAGE NOTES
Pt arrives via [facility] from Assisted Living facility [facility] for fever 101, weakness, shortness of breath, urinary frequency all starting today. Pt denies any pain or CP.     Triage Assessment (Adult)       Row Name 01/22/25 1110          Triage Assessment    Airway WDL WDL        Respiratory WDL    Respiratory WDL X;rhythm/pattern     Rhythm/Pattern, Respiratory shortness of breath;tachypneic;dyspnea upon exertion        Skin Circulation/Temperature WDL    Skin Circulation/Temperature WDL WDL        Cardiac WDL    Cardiac WDL X     Cardiac Rhythm ST        Peripheral/Neurovascular WDL    Peripheral Neurovascular WDL WDL        Cognitive/Neuro/Behavioral WDL    Cognitive/Neuro/Behavioral WDL WDL

## 2025-01-22 NOTE — ED PROVIDER NOTES
EMERGENCY DEPARTMENT ENCOUNTER      NAME: Lance Ibrahim  AGE: 79 year old male  YOB: 1945  MRN: [id number]  EVALUATION DATE & TIME: 2025 11:01 AM    PCP: Rickey Adamson    ED PROVIDER: Alexx Adair M.D.      Chief Complaint   Patient presents with    Shortness of Breath    Generalized Weakness         FINAL IMPRESSION:  1. Cholangitis (H)          ED COURSE & MEDICAL DECISION MAKIN:28 AM I met with the patient, obtained history, performed an initial exam, and discussed options and plan for diagnostics and treatment here in the ED.   11:51 AM Lab called with a critical value: Lactic acid 4.0.   11:54 AM Code Sepsis called.  1:57 PM Repeat exam now demonstrates right upper quadrant abdominal tenderness.  We will obtain ultrasound imaging.  3:14 PM spoke with general surgery, Dr. Ribeiro  3:20 PM Spoke with IDRIS Guillermo.   3:24 PM Repeat benign, no hypotension, endorsing some more pain.  Discussed findings need to transfer.          Pertinent Labs & Imaging studies reviewed. (See chart for details)  79 year old male presents to the Emergency Department for evaluation of fever, shortness of breath. Patient appears non toxic with stable vitals signs, patient is slightly tachycardic but afebrile, hypoxia.  Patient denies any acute pain, does endorse some chest tightness and shortness of breath, more so endorsed elevated blood sugar.  Per nursing report was febrile at care facility.  Of note, patient states for several weeks has had increased urinary frequency but no dysuria or hematuria.  Per review of the medical record, did review office visit through [facility] on 2025, patient with known history of coronary artery disease, cardiomyopathy, hypertension.  Considered viral URI, influenza, considered but less likely bacterial pneumonia, concern but overall low suspicion for sepsis.  Considered CT imaging of the chest however with no ongoing chest pain or  hypoxia no indication for emergent CT imaging at this time.  Feel we can initiate workup with screening labs, urine studies, blood cultures, lactic acid, viral screen and chest x-ray.    Reassessment: Labs by my independent or potation concerning for sepsis with an elevated lactic acid, though I question whether or not patient's metformin is contributing to this.  That said patient reported known fever, was tachycardic and tachypneic, though he had no elevated white blood cell count concern for code sepsis initiated, patient has several antibiotic allergies and so broad-spectrum antibiotics initiated with blood cultures pending.  At initial antibiotic administration unclear as to the exact source of the infection, patient initially presented with more so respiratory and urinary symptoms.  Initial troponin elevated, we did obtain delta which showed no marked elevation, again certainly nothing suggest ACS, ECG nonischemic.  BNP elevated and chest x-ray showed some stable pulmonary vascular edema, therefore we augmented the sepsis fluid resuscitation treatment as to not fluid overload the patient and he was simply given 1 L, again no hypotension.  Labs significant for elevated alk phos, LFTs and bilirubin, repeat exam demonstrated more red for quadrant abdominal tenderness.  Urinalysis, COVID influenza and RSV showed no focal infectious source.  Chest x-ray again reported no focal consolidation.  Therefore we obtained ultrasound imaging which reported gallbladder wall thickening, enlarged liver, bile duct dilatation.  Spoke with both general surgery and [facility].  Concern at this time is for possible cholangitis and recommendations were for emergent ERCP.  [facility] and so therefore patient will need to be transferred either to [facility].  At time of this dictation we are assessing whether or not there are beds available at these facilities, if not we will  need to look outside of the system.  Patient was given Cipro Flagyl for more so focalize GI etiology of infection, again appreciate patient's antibiotic allergies.  On repeat exam he appeared comfortable but was asking for some pain medications, discussed findings and need for transfer for the patient who is in agreement.  Patient signed out to the oncoming afternoon provider pending bed availability and need for transfer for ERCP.    Medical Decision Making  Obtained supplemental history:Supplemental history obtained?: No  Reviewed external records: External records reviewed?: No  Care impacted by chronic illness:Chronic Kidney Disease, Diabetes, Heart Disease, and Hypertension  Did you consider but not order tests?: Work up considered but not performed and documented in chart, if applicable  Did you interpret images independently?: Independent interpretation of ECG and images noted in documentation, when applicable.  Consultation discussion with other provider:Did you involve another provider (consultant, , pharmacy, etc.)?: I discussed the care with another health care provider, see documentation for details.  Pending transfer based on bed availability    MIPS: Not Applicable        At the conclusion of the encounter I discussed the results of all of the tests and the disposition. The questions were answered and return precautions provided. The patient or family acknowledged understanding and was agreeable with the care plan.         MEDICATIONS GIVEN IN THE EMERGENCY:  Medications   sodium chloride (PF) 0.9% PF flush 3 mL (has no administration in time range)   sodium chloride (PF) 0.9% PF flush 3 mL (3 mLs Intracatheter $Given 1/22/25 1200)   ciprofloxacin (CIPRO) infusion 400 mg (has no administration in time range)   metroNIDAZOLE (FLAGYL) infusion 500 mg (has no administration in time range)   morphine (PF) injection 2 mg (has no administration in time range)   ertapenem (INVanz) 1 g vial to attach to NS  "100 mL bag (0 g Intravenous Stopped 1/22/25 1235)   doxycycline (VIBRAMYCIN) 100 mg vial to attach to  mL bag (0 mg Intravenous Stopped 1/22/25 1454)   sodium chloride 0.9% BOLUS 1,000 mL (0 mLs Intravenous Stopped 1/22/25 1315)       NEW PRESCRIPTIONS STARTED AT TODAY'S ER VISIT  New Prescriptions    No medications on file            =================================================================    HPI    Patient information was obtained from: patient     Use of Intrepreter: N/A       Lance Ibrahim is a 79 year old male who presents with hyperglycemia, fever, and shortness of breath.      The patient reports that he was sent here from [facility] for evaluation of elevated blood sugar and fever (101F per EMS). However, his complaint is that he has shortness of breath \"like a constriction in my chest\" that limits his ability to take a deep breath. He also complains of 2 weeks of urinary frequency every ~30 minutes.     He denies any pain, cough, vomiting, dysuria, hematuria, change in appetite, or any other complaints at this time.         VITALS:  Patient Vitals for the past 24 hrs:   BP Temp Pulse Resp SpO2 Height Weight   01/22/25 1430 125/60 -- 98 21 97 % -- --   01/22/25 1400 121/59 -- 99 22 99 % -- --   01/22/25 1315 133/71 -- 98 24 95 % -- --   01/22/25 1230 110/52 -- 99 23 95 % -- --   01/22/25 1215 133/61 -- (!) 108 30 94 % -- --   01/22/25 1200 122/56 -- 100 20 (!) 90 % -- --   01/22/25 1145 121/59 -- 101 21 (!) 90 % -- --   01/22/25 1130 128/60 -- 102 26 92 % -- --   01/22/25 1115 127/61 -- 104 30 93 % -- --   01/22/25 1105 128/59 99.3  F (37.4  C) (!) 106 26 93 % 1.829 m (6') 93.4 kg (206 lb)        PHYSICAL EXAM    Constitutional:  Awake, in no apparent distress   HENT:  Normocephalic, Atraumatic. Bilateral external ears normal. Oropharynx moist. Nose normal. Neck- Normal range of motion with no guarding, No midline cervical tenderness, Supple, No stridor.   Eyes:  PERRL, EOMI with no " signs of entrapment, Conjunctiva normal, No discharge.   Respiratory:  Normal breath sounds, No respiratory distress, No wheezing.    Cardiovascular: Slight tachycardia, Normal rhythm, No appreciable rubs or gallops.   GI:  Soft, No tenderness, No distension, No palpable masses  Gu: No CVA tenderness  Musculoskeletal:  Intact distal pulses, No edema.  No gross deformities  Integument:  Warm, Dry, No erythema, No rash.   Neurologic:  Alert, Normal motor function, Normal sensory function, No focal deficits noted.   Psychiatric:  Affect normal, Judgment normal, Mood normal.     LAB:  All pertinent labs reviewed and interpreted.  Results for orders placed or performed during the hospital encounter of 01/22/25   XR Chest 2 Views    Impression    IMPRESSION:     Cardiomegaly with central pulmonary vascular congestion, mid and lower lung predominant mild interstitial edema, and trace bilateral pleural effusions, similar to 1/6/2025. No new airspace opacities. No pneumothorax.    Aortic atherosclerotic calcifications. Unchanged median sternotomy wires, spinal stimulator leads, mediastinal clips, and left humeral screws. An additional electronic device overlying the mediastinum on the lateral view does not have a correlate on the   frontal view and is likely within the overlying structures.   US Abdomen Limited (RUQ)    Impression    IMPRESSION:  1.  Enlarged echogenic liver could represent steatohepatitis.  2.  Nodular surface contour consistent with fibrosis.  3.  Gallbladder wall thickening which could be due to acute or chronic cholecystitis, and cirrhosis.  4.  Bile duct dilation.       Influenza A/B, RSV and SARS-CoV2 PCR (COVID-19) Nasopharyngeal    Specimen: Nasopharyngeal; Swab   Result Value Ref Range    Influenza A PCR Negative Negative    Influenza B PCR Negative Negative    RSV PCR Negative Negative    SARS CoV2 PCR Negative Negative   Comprehensive metabolic panel   Result Value Ref Range    Sodium 136 135 -  145 mmol/L    Potassium 3.8 3.4 - 5.3 mmol/L    Carbon Dioxide (CO2) 21 (L) 22 - 29 mmol/L    Anion Gap 15 7 - 15 mmol/L    Urea Nitrogen 29.6 (H) 8.0 - 23.0 mg/dL    Creatinine 1.18 (H) 0.67 - 1.17 mg/dL    GFR Estimate 62 >60 mL/min/1.73m2    Calcium 9.3 8.8 - 10.4 mg/dL    Chloride 100 98 - 107 mmol/L    Glucose 178 (H) 70 - 99 mg/dL    Alkaline Phosphatase 356 (H) 40 - 150 U/L     (H) 0 - 45 U/L    ALT 75 (H) 0 - 70 U/L    Protein Total 6.8 6.4 - 8.3 g/dL    Albumin 3.4 (L) 3.5 - 5.2 g/dL    Bilirubin Total 1.8 (H) <=1.2 mg/dL   Lactic Acid Whole Blood with 1X Repeat in 2 HR when >2   Result Value Ref Range    Lactic Acid, Initial 4.0 (HH) 0.7 - 2.0 mmol/L   Blood gas venous   Result Value Ref Range    pH Venous 7.48 (H) 7.32 - 7.43    pCO2 Venous 33 (L) 40 - 50 mm Hg    pO2 Venous 34 25 - 47 mm Hg    Bicarbonate Venous 25 21 - 28 mmol/L    Base Excess/Deficit Venous 1.5 -3.0 - 3.0 mmol/L    FIO2 21     Oxyhemoglobin Venous 68 (L) 70 - 75 %    O2 Sat, Venous 68.9 (L) 70.0 - 75.0 %   Result Value Ref Range    Troponin T, High Sensitivity 40 (H) <=22 ng/L   UA with Microscopic reflex to Culture    Specimen: Urine, NOS   Result Value Ref Range    Color Urine Yellow Colorless, Straw, Light Yellow, Yellow    Appearance Urine Clear Clear    Glucose Urine Negative Negative mg/dL    Bilirubin Urine Negative Negative    Ketones Urine Negative Negative mg/dL    Specific Gravity Urine 1.014 1.001 - 1.030    Blood Urine Negative Negative    pH Urine 7.0 5.0 - 7.0    Protein Albumin Urine 100 (A) Negative mg/dL    Urobilinogen Urine <2.0 <2.0 mg/dL    Nitrite Urine Negative Negative    Leukocyte Esterase Urine Negative Negative    Mucus Urine Present (A) None Seen /LPF    RBC Urine 1 <=2 /HPF    WBC Urine 2 <=5 /HPF    Squamous Epithelials Urine <1 <=1 /HPF    Hyaline Casts Urine 4 (H) <=2 /LPF   CBC with platelets and differential   Result Value Ref Range    WBC Count 4.4 4.0 - 11.0 10e3/uL    RBC Count 3.74 (L) 4.40  - 5.90 10e6/uL    Hemoglobin 10.4 (L) 13.3 - 17.7 g/dL    Hematocrit 33.1 (L) 40.0 - 53.0 %    MCV 89 78 - 100 fL    MCH 27.8 26.5 - 33.0 pg    MCHC 31.4 (L) 31.5 - 36.5 g/dL    RDW 19.6 (H) 10.0 - 15.0 %    Platelet Count 227 150 - 450 10e3/uL    % Neutrophils 95 %    % Lymphocytes 3 %    % Monocytes 1 %    % Eosinophils 0 %    % Basophils 0 %    % Immature Granulocytes 1 %    NRBCs per 100 WBC 0 <1 /100    Absolute Neutrophils 4.2 1.6 - 8.3 10e3/uL    Absolute Lymphocytes 0.1 (L) 0.8 - 5.3 10e3/uL    Absolute Monocytes 0.1 0.0 - 1.3 10e3/uL    Absolute Eosinophils 0.0 0.0 - 0.7 10e3/uL    Absolute Basophils 0.0 0.0 - 0.2 10e3/uL    Absolute Immature Granulocytes 0.0 <=0.4 10e3/uL    Absolute NRBCs 0.0 10e3/uL   Lactic acid whole blood   Result Value Ref Range    Lactic Acid 3.8 (H) 0.7 - 2.0 mmol/L   Extra Blue Top Tube   Result Value Ref Range    Hold Specimen JIC    Result Value Ref Range    Troponin T, High Sensitivity 40 (H) <=22 ng/L   Nt probnp inpatient (BNP)   Result Value Ref Range    N terminal Pro BNP Inpatient 22,204 (H) 0 - 1,800 pg/mL   ECG 12-LEAD WITH MUSE (LHE)   Result Value Ref Range    Systolic Blood Pressure 128 mmHg    Diastolic Blood Pressure 59 mmHg    Ventricular Rate 105 BPM    Atrial Rate 105 BPM    MD Interval 196 ms    QRS Duration 108 ms     ms    QTc 467 ms    P Axis 64 degrees    R AXIS -38 degrees    T Axis 113 degrees    Interpretation ECG       Sinus tachycardia  Left axis deviation  ST & T wave abnormality, consider lateral ischemia  Abnormal ECG  When compared with ECG of 17-Jan-2025 16:44,  Premature supraventricular complexes are no longer Present  ST no longer depressed in Inferior leads  ST elevation now present in Anterior leads  Confirmed by SEE ED PROVIDER NOTE FOR, ECG INTERPRETATION (4000),  MARIELLA SIGALA ([id number]) on 1/22/2025 12:15:04 PM         RADIOLOGY:  US Abdomen Limited (RUQ)   Final Result   IMPRESSION:   1.  Enlarged echogenic liver could  represent steatohepatitis.   2.  Nodular surface contour consistent with fibrosis.   3.  Gallbladder wall thickening which could be due to acute or chronic cholecystitis, and cirrhosis.   4.  Bile duct dilation.            XR Chest 2 Views   Final Result   IMPRESSION:       Cardiomegaly with central pulmonary vascular congestion, mid and lower lung predominant mild interstitial edema, and trace bilateral pleural effusions, similar to 1/6/2025. No new airspace opacities. No pneumothorax.      Aortic atherosclerotic calcifications. Unchanged median sternotomy wires, spinal stimulator leads, mediastinal clips, and left humeral screws. An additional electronic device overlying the mediastinum on the lateral view does not have a correlate on the    frontal view and is likely within the overlying structures.             EKG:    Sinus tachycardia, no specific ST acute ischemic changes, no concerning interval punctation, compared to ECG of January 17, 2025, no specific ST ischemic changes  I have independently reviewed and interpreted the EKG(s) documented above.    PROCEDURES    CRITICAL CARE NOTE:  Indication: Sepsis, cholangitis  Interventions: Patient placed on continuous pulse oximetry with cardiac telemetry.  Large-bore IV access obtained.  Concern for sepsis with fever, tachycardia, tachypnea, elevated lactic acid.  Adjusted fluid resuscitation based on patient's elevated BNP, pulmonary vascular edema, patient was given 1 L IV fluids, again no hypotension.  Patient given broad-spectrum antibiotics, blood cultures pending.  Discussed patient case with [facility] and surgery.  Patient will need transfer for emergent ERCP.  Treatments: Broad-spectrum antibiotics, IV fluids, [facility] and surgery consultation  Critical Care time excluding procedures: 90 minutes      [facility]:   CMS Diagnoses: IV Antibiotics given and/or elevated Lactate of 4 and no sepsis note found - Delete this  reminder and enter the sepsis note or '.edcms' before signing chart.>>>The patient has signs of sepsis   Sepsis ED evaluation   The patient has signs of sepsis as evidenced by:  1. Presence of 2 SIRS criteria, suspected infection, AND  2. Organ dysfunction: Lactic Acidosis with value >2.0 due to sepsis    Time zero:  see note  on 01/22/25 as this was the time when  see note  resulted, raising suspicion for bacterial infection and Lactate was resulted and the level was greater than 2.    Lactic Acid Results:  Recent Labs   Lab Test 01/22/25  1346 01/22/25  1142 11/18/24  1630   LACT 3.8* 4.0* 1.8       3 Hour Bundle 6 Hour Bundle (Reassessment)   Blood Cultures before IV Antibiotics: Yes  Antibiotics given: see below  Prehospital fluid volume (mL):                     Total fluids given (ED +Pre-hospital):  Full 30 mL/kg bolus intentionally NOT administered to this patient due to CHF and acute Pulmonary Edema. The target volume to infuse in this patient is 1000cc.   Repeat Lactic Acid Level: Ordered by reflex for 2 hours after initial lactic acid collection.  Vasopressors: MAP>65 after initial IVF bolus, will continue to monitor fluid status and vital signs.  Repeat perfusion exam: I attest to having performed a repeat sepsis exam and assessment of perfusion at  see note .   BMI Readings from Last 1 Encounters:   01/22/25 27.94 kg/m        Anti-infectives (From admission through now)      Start     Dose/Rate Route Frequency Ordered Stop    01/22/25 1530  ciprofloxacin (CIPRO) infusion 400 mg         400 mg  200 mL/hr over 60 Minutes Intravenous ONCE 01/22/25 1505      01/22/25 1530  metroNIDAZOLE (FLAGYL) infusion 500 mg        Note to Pharmacy: Metronidazole IV may be dosed more frequently than Q12h for bacteremia, CNS infection and Clostridium difficile.    500 mg  over 60 Minutes Intravenous ONCE 01/22/25 1505      01/22/25 1400  doxycycline (VIBRAMYCIN) 100 mg vial to attach to  mL bag         100 mg  over  1-2 Hours Intravenous ONCE 01/22/25 1154 01/22/25 1454    01/22/25 1300  ertapenem (INVanz) 1 g vial to attach to  mL bag         1 g  over 30 Minutes Intravenous ONCE 01/22/25 1154 01/22/25 1235                  I, Earle Ordonez, am serving as a scribe to document services personally performed by Alexx Adair MD, based on my observation and the provider's statements to me. I, Alexx Adair MD attest that Earle Ordonez is acting in a scribe capacity, has observed my performance of the services and has documented them in accordance with my direction.    Alexx Adair M.D.  Emergency Medicine  [facility] EMERGENCY ROOM  [address] [place] [id number]  [phone]  Dept: [phone]     Alexx Adair MD  01/22/25 0878

## 2025-01-22 NOTE — LETTER
[facility]  [address] [place] [id number]  [phone]    FACSIMILE TRANSMITTAL SHEET    TO: Val GREEN fax # [phone]    _____URGENT _____REVIEW ONLY _____PLEASE COMMENT____PLEASE REPLY    NOTES/COMMENTS: Attached please find final discharge orders    [facility] covering [phone]                                    IF YOU DID NOT RECEIVE THE CORRECT NUMBER OF PAGES OR THE FAX DID NOT COME THROUGH CLEARLY, PLEASE CALL THE SENDER     CONFIDENTIALITY STATEMENT: Confidential information that may accompany this transmission contains protected health information under state and federal law and is legally privileged. This information is intended only for the use of the individual or entity named above and may be used only for carrying out treatment, payment or other healthcare operations. The recipient or person responsible for delivering this information is prohibited by law from disclosing this information without proper authorization to any other party, unless required to do so by law or regulation. If you are not the intended recipient, you are hereby notified that any review, dissemination, distribution, or copying of this message is strictly prohibited. If you have received this communication in error, please destroy the materials and contact us immediately by calling the number listed above. No response indicates that the information was received by the appropriate authorized party

## 2025-01-22 NOTE — CONSULTS
General Surgery Consultation  Lance Ibrahim MRN# [id number]   Age/Sex: 79 year old male YOB: 1945     Reason for consult: 1. Cholangitis (H)            Requesting physician: Emergency Department                   Assessment and Plan:   Assessment:  Fever and shortness of breath  Mr. 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 Afebrile on arrival and intermittently tachycardic (HR 's). Labs with no leukocytosis, elevated transaminases (ALT/AST 75/160), hyperbilirubinemia (T bili 1.8), lactic acidosis 4 > 3.8, BNP 22,204, trop 40 > 40, Cr 1.18. RUQ US obtained and demonstrated gallbladder sludge and gallbladder wall thickening with CBD measuring 15 mm. Recommend GI consultation for consideration of ERCP. Patient is a poor surgical candidate and will follow along for possible cholecystectomy versus cholecystostomy tube. Per discussion with ER team, plan for transfer to [facility] or [facility] pending bed availability for ERCP.    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 History is limited due to patient is a poor historian. Main complaint at the moment is lower abdominal pain and urinary urgency. Patient has osman catheter in place and has disconnected the osman bag from the catheter. Endorses fever  and chills. Denies nausea, vomiting. States his abdominal pain is different compared to when he was admitted last year. His pain is primarily in his lower abdomen at this time. Per chart review, patient underwent ERCP/EUS in 4/2024 for a CBD stricture and biopsies were obtained and a stent was placed. Biopsies demonstrated scant benign columnar epithelium and were negative for malignant cells. Recommendation for repeat ERCP in 10-12 weeks but unclear if this was performed. Per chart review, patient's abdominal surgical history includes a previous ventral hernia repair in 2004.           Past Medical History:     Past Medical History:   Diagnosis Date    Anemia     Arthritis     osteoarthritis knees    BPH     CAD (coronary artery disease)     subtotal occlusion in the small distal LAD     Cardiomyopathy     Cerebral artery occlusion with cerebral infarction     TIA 1993, no residual    Cervicalgia     CHF (congestive heart failure) (H)     Chronic kidney disease     Chronic low back pain     Chronic rhinitis     Gouty arthropathy     hands    Hyperlipidemia     Hypertension     Kidney stone     Nocturia     Obesity     Osteomyelitis (H) 08/2023    Foot    PVD (peripheral vascular disease)     Sleep apnea     doesn't use cpap    Spinal cord stimulator status 04/2024    Spinal cord stimulator in place    TIA (transient ischaemic attack) 1993    Type 2 diabetes mellitus - 11/08/2018    Ureteral stone               Past Surgical History:     Past Surgical History:   Procedure Laterality Date    AMPUTATE FOOT Right 08/07/2023    Procedure: AMPUTATION, right hallux;  Surgeon: Nakul Salazar DPM;  Location: [facility] OR    AMPUTATE TOE(S) Left 10/15/2018    Procedure: AMPUTATE TOE(S);  Left third toe amputation;  Surgeon: Ayaka Azevedo DPM, Podiatry/Foot and Ankle Surgery;  Location: [facility] OR    ARTHROPLASTY KNEE Right 12/07/2018    Procedure: Right total knee arthroplasty;  Surgeon: Issa Cunha MD;   Location: [facility] OR    COLONOSCOPY  03/09/2013    Procedure: COLONOSCOPY;  COLONOSCOPY;  Surgeon: Chau Hogan MD;  Location: [facility]    CORONARY ARTERY BYPASS GRAFT, WITH ENDOSCOPIC VESSEL PROCUREMENT N/A 11/18/2024    Procedure: CORONARY ARTERY BYPASS GRAFT TIMES THREE, WITH LEFT INTERNAL MAMARARY ARTERY HARVEST, LEFT ENDOSCOPIC VESSEL PROCUREMENT, EPIAORTIC ULTRASOUND;  Surgeon: Tsering Evans MD;  [place] [facility] [place]    CV CORONARY ANGIOGRAM N/A 11/14/2024    Procedure: Coronary Angiogram;  Surgeon: Talon Lew MD;  Location: [facility] LAB CV    CV HEART CATHETERIZATION WITH POSSIBLE INTERVENTION Left 03/05/2019    Procedure: Coronary Angiogram;  Surgeon: Nas Linda MD;  Location:  HEART CARDIAC CATH LAB    CV INTRA AORTIC BALLOON N/A 11/16/2024    Procedure: Intra aortic Balloon Pump Insertion;  Surgeon: Jessica Yepez MD;  Location: [facility] CV    CV INTRAVASULAR ULTRASOUND N/A 11/14/2024    Procedure: Intravascular Ultrasound;  Surgeon: Talon Lew MD;  Location: [facility] LAB CV    CV LEFT HEART CATH N/A 11/14/2024    Procedure: Left Heart Catheterization;  Surgeon: Talon Lew MD;  Location: [facility] CV    Diastasis recti repair  1985    ENDOSCOPIC RETROGRADE CHOLANGIOPANCREATOGRAM N/A 04/30/2024    Procedure: ENDOSCOPIC RETROGRADE CHOLANGIOPANCREATOGRAPHY, BILIARY SPHINCTEROTOMY, DILATION, BRUSHING, BIOPSIES with DISTAL EXTRA HEPATIC BILE DUCT STRICTURE;  Surgeon: Aldo Gongora MD;  Location: [facility] [place]    ESOPHAGOSCOPY, GASTROSCOPY, DUODENOSCOPY (EGD), COMBINED N/A 04/30/2024    Procedure: ESOPHAGOGASTRODUODENOSCOPY, WITH ENDOSCOPIC ULTRASOUND GUIDANCE;  Surgeon: Aldo Gongora MD;  Location: [facility] [place]    EXTRACAPSULAR CATARACT EXTRATION WITH INTRAOCULAR LENS IMPLANT Left 03/13/2017    EXTRACAPSULAR CATARACT EXTRATION WITH INTRAOCULAR LENS IMPLANT Right     FOOT SURGERY  04/2013    cyst removal     HERNIA REPAIR   "2004    ventral     IR DISC ASPIRATION INJECTION  2024    IRRIGATION AND DEBRIDEMENT SHOULDER, COMBINED Right 2024    Procedure: IRRIGATION AND DEBRIDEMENT, SHOULDER;  Surgeon: Terence Hanson MD;  Location: [facility] OR    IRRIGATION AND DEBRIDEMENT UPPER EXTREMITY, COMBINED Right 2024    Procedure: IRRIGATION AND DEBRIDEMENT, ELBOW AND THUMB;  Surgeon: Terence Hanson MD;  Location: [facility] OR    ORIF Shoulder Left     PROSTATE SURGERY  2024    Urolift    RESECT CLAVICLE DISTAL Right 2024    Procedure: EXCISION, CLAVICLE, DISTAL;  Surgeon: Terence Hanson MD;  Location: [facility] OR    SPINAL CORD STIMULATOR IMPLANT  2024    TRANSESOPHAGEAL ECHOCARDIOGRAM INTRAOPERATIVE N/A 2024    Procedure: ECHOCARDIOGRAM, TRANSESOPHAGEAL, INTRAOPERATIVE;  Surgeon: Tsering Evans MD;  [place] [facility] [place]    Ventral hernia repair NOS  1987             Social History:    reports that he quit smoking about 31 years ago. His smoking use included cigarettes. He has never been exposed to tobacco smoke. He has never used smokeless tobacco. He reports that he does not currently use alcohol. He reports that he does not use drugs.           Family History:     Family History   Problem Relation Age of Onset    Family History Negative Mother          88 yo    Cancer Father          76 yo brain    Diabetes Maternal Grandfather          91 yo    Alcohol/Drug Paternal Grandfather             Colon Cancer No family hx of               Allergies:     Allergies   Allergen Reactions    Ancef [Cefazolin] Rash    Cephalexin Itching and Rash     Allergic reaction required hospitalization 2024    Penicillins Anaphylaxis    Other Drug Allergy (See Comments) Unknown    Sulfa Antibiotics      \"itchy rash, swelling of face and hands\"              Medications:     Prior to Admission medications    Medication Sig Start " Date End Date Taking? Authorizing Provider   acetaminophen (TYLENOL) 500 MG tablet Take 1,000 mg by mouth 3 times daily. TID while awake and PRN nightly   Yes Reported, Patient   allopurinol (ZYLOPRIM) 100 MG tablet Take 100 mg by mouth daily. 8/30/24  Yes Reported, Patient   aspirin (ASA) 81 MG chewable tablet Take 2 tablets (162 mg) by mouth daily. 11/27/24  Yes Annie Sarabia PA-C   atorvastatin (LIPITOR) 40 MG tablet Take 1 tablet (40 mg) by mouth every evening. 11/26/24  Yes Annie Sarabia PA-C   bumetanide (BUMEX) 1 MG tablet Take 1 tablet (1 mg) by mouth daily. 11/27/24  Yes Annie Sarabia PA-C   calcium carbonate (TUMS) 500 MG chewable tablet Take 1,000 mg by mouth 3 times daily as needed for heartburn 8/17/23  Yes Reported, Patient   diclofenac (VOLTAREN) 1 % topical gel Apply 2 g topically 2 times daily as needed for moderate pain.   Yes Reported, Patient   Ferrous Gluconate 324 (37.5 Fe) MG TABS Take 1 tablet by mouth daily.   Yes Reported, Patient   finasteride (PROSCAR) 5 MG tablet Take 5 mg by mouth daily   Yes Unknown, Entered By History   gabapentin (NEURONTIN) 300 MG capsule Take 1 capsule (300 mg) by mouth 3 times daily as needed. 11/26/24  Yes Annie Sarabia PA-C   glipiZIDE (GLUCOTROL XL) 2.5 MG 24 hr tablet Take 1 tablet (2.5 mg) by mouth daily. 11/27/24  Yes Annie Sarabia PA-C   insulin glargine (LANTUS PEN) 100 UNIT/ML pen Inject 18 Units subcutaneously 2 times daily.   Yes Unknown, Entered By History   lisinopril (ZESTRIL) 30 MG tablet Take 1 tablet (30 mg) by mouth daily. 1/16/25  Yes Lc Amaya MD   metFORMIN (GLUCOPHAGE XR) 500 MG 24 hr tablet Take 1 tablet (500 mg) by mouth daily (with dinner). 1/3/25  Yes Edwin Ling MD   metoprolol succinate ER (TOPROL XL) 100 MG 24 hr tablet Take 1 tablet (100 mg) by mouth daily. 11/27/24  Yes Annie Sarabia PA-C   pantoprazole (PROTONIX) 40 MG EC tablet Take 1 tablet (40 mg) by mouth every morning (before breakfast). 11/27/24  Yes  Annie Sarabia PA-C   sodium bicarbonate 650 MG tablet Take 3 tablets (1,950 mg) by mouth 3 times daily. 11/26/24  Yes Annie Sarabia PA-C   spironolactone (ALDACTONE) 25 MG tablet Take 1 tablet (25 mg) by mouth daily. 11/27/24  Yes Annie Sarabia PA-C   traZODone (DESYREL) 50 MG tablet Take 25 mg by mouth at bedtime.   Yes Reported, Patient   ursodiol (ACTIGALL) 300 MG capsule Take 1 capsule (300 mg) by mouth 2 times daily 5/2/24  Yes Isabelle Vidal MD   White Petrolatum-Mineral Oil (LUBRIFRESH P.M.) OINT Apply 1 g to eye 2 times daily as needed for dry eyes. 11/26/24  Yes Annie Sarabia PA-C   blood glucose monitoring (NO BRAND SPECIFIED) meter device kit Use to test blood sugar 2 times daily or as directed. 12/24/24   Rickey Adamson MD   Continuous Glucose Sensor (DEXCOM G7 SENSOR) MISC 1 each every 10 days. Change sensor every 10 days 12/30/24   Renetta Burch APRN CNP              Review of Systems:   The Review of Systems is negative other than noted in the HPI            Physical Exam:   Patient Vitals for the past 24 hrs:   BP Temp Pulse Resp SpO2 Height Weight   01/22/25 1430 125/60 -- 98 21 97 % -- --   01/22/25 1400 121/59 -- 99 22 99 % -- --   01/22/25 1315 133/71 -- 98 24 95 % -- --   01/22/25 1230 110/52 -- 99 23 95 % -- --   01/22/25 1215 133/61 -- (!) 108 30 94 % -- --   01/22/25 1200 122/56 -- 100 20 (!) 90 % -- --   01/22/25 1145 121/59 -- 101 21 (!) 90 % -- --   01/22/25 1130 128/60 -- 102 26 92 % -- --   01/22/25 1115 127/61 -- 104 30 93 % -- --   01/22/25 1105 128/59 99.3  F (37.4  C) (!) 106 26 93 % 1.829 m (6') 93.4 kg (206 lb)          Intake/Output Summary (Last 24 hours) at 1/22/2025 1535  Last data filed at 1/22/2025 1449  Gross per 24 hour   Intake --   Output 335 ml   Net -335 ml      Constitutional:   awake, alert, cooperative, no apparent distress, and appears stated age       Eyes:   PERRL, conjunctiva/corneas clear, EOM's intact; no scleral edema or icterus noted        ENT:    Normocephalic, without obvious abnormality, atraumatic, Lips, mucosa, and tongue normal        Lungs:   Normal respiratory effort, no accessory muscle use       Cardiovascular:   Regular rate and rhythm       Abdomen:   Softly distended, mildly tender to palpation over RUQ and RLQ regions. Negative Garcia's sign.       Musculoskeletal:   No obvious swelling, bruising or deformity       Skin:   Skin color and texture normal for patient, no rashes or lesions              Data:         All imaging studies reviewed by me.    Results for orders placed or performed during the hospital encounter of 01/22/25 (from the past 24 hours)   ECG 12-LEAD WITH MUSE (LHE)   Result Value Ref Range    Systolic Blood Pressure 128 mmHg    Diastolic Blood Pressure 59 mmHg    Ventricular Rate 105 BPM    Atrial Rate 105 BPM    FL Interval 196 ms    QRS Duration 108 ms     ms    QTc 467 ms    P Axis 64 degrees    R AXIS -38 degrees    T Axis 113 degrees    Interpretation ECG       Sinus tachycardia  Left axis deviation  ST & T wave abnormality, consider lateral ischemia  Abnormal ECG  When compared with ECG of 17-Jan-2025 16:44,  Premature supraventricular complexes are no longer Present  ST no longer depressed in Inferior leads  ST elevation now present in Anterior leads  Confirmed by SEE ED PROVIDER NOTE FOR, ECG INTERPRETATION (4000),  MARIELLA SIGALA ([id number]) on 1/22/2025 12:15:04 PM     Influenza A/B, RSV and SARS-CoV2 PCR (COVID-19) Nasopharyngeal    Specimen: Nasopharyngeal; Swab   Result Value Ref Range    Influenza A PCR Negative Negative    Influenza B PCR Negative Negative    RSV PCR Negative Negative    SARS CoV2 PCR Negative Negative    Narrative    Testing was performed using the Xpert Xpress CoV2/Flu/RSV Assay on the [employer] GeneXpert Instrument. This test should be ordered for the detection of SARS-CoV2, influenza, and RSV viruses in individuals with signs and symptoms of respiratory tract infection. This test is  for in vitro diagnostic use under the [place] FDA for laboratories certified under CLIA to perform high or moderate complexity testing. This test has been [place] FDA cleared. A negative result does not rule out the presence of PCR inhibitors in the specimen or target RNA in concentration below the limit of detection for the assay. If only one viral target is positive but coinfection with multiple targets is suspected, the sample should be re-tested with another FDA cleared, approved, or authorized test, if coninfection would change clinical management. This test was validated by the [facility]. These laboratories are certified under the Clinical Laboratory Improvement Amendments of 1988 (CLIA-88) as qualified to perfom high complexity laboratory testing.   CBC with platelets differential    Narrative    The following orders were created for panel order CBC with platelets differential.  Procedure                               Abnormality         Status                     ---------                               -----------         ------                     CBC with platelets and d...[[id number]]  Abnormal            Final result                 Please view results for these tests on the individual orders.   Comprehensive metabolic panel   Result Value Ref Range    Sodium 136 135 - 145 mmol/L    Potassium 3.8 3.4 - 5.3 mmol/L    Carbon Dioxide (CO2) 21 (L) 22 - 29 mmol/L    Anion Gap 15 7 - 15 mmol/L    Urea Nitrogen 29.6 (H) 8.0 - 23.0 mg/dL    Creatinine 1.18 (H) 0.67 - 1.17 mg/dL    GFR Estimate 62 >60 mL/min/1.73m2    Calcium 9.3 8.8 - 10.4 mg/dL    Chloride 100 98 - 107 mmol/L    Glucose 178 (H) 70 - 99 mg/dL    Alkaline Phosphatase 356 (H) 40 - 150 U/L     (H) 0 - 45 U/L    ALT 75 (H) 0 - 70 U/L    Protein Total 6.8 6.4 - 8.3 g/dL    Albumin 3.4 (L) 3.5 - 5.2 g/dL    Bilirubin Total 1.8 (H) <=1.2 mg/dL   Lactic Acid Whole Blood with 1X Repeat in 2 HR when >2   Result Value Ref Range    Lactic  Acid, Initial 4.0 (HH) 0.7 - 2.0 mmol/L   Blood gas venous   Result Value Ref Range    pH Venous 7.48 (H) 7.32 - 7.43    pCO2 Venous 33 (L) 40 - 50 mm Hg    pO2 Venous 34 25 - 47 mm Hg    Bicarbonate Venous 25 21 - 28 mmol/L    Base Excess/Deficit Venous 1.5 -3.0 - 3.0 mmol/L    FIO2 21     Oxyhemoglobin Venous 68 (L) 70 - 75 %    O2 Sat, Venous 68.9 (L) 70.0 - 75.0 %    Narrative    In healthy individuals, oxyhemoglobin (O2Hb) and oxygen saturation (SO2) are approximately equal. In the presence of dyshemoglobins, oxyhemoglobin can be considerably lower than oxygen saturation.   Troponin T, High Sensitivity   Result Value Ref Range    Troponin T, High Sensitivity 40 (H) <=22 ng/L   CBC with platelets and differential   Result Value Ref Range    WBC Count 4.4 4.0 - 11.0 10e3/uL    RBC Count 3.74 (L) 4.40 - 5.90 10e6/uL    Hemoglobin 10.4 (L) 13.3 - 17.7 g/dL    Hematocrit 33.1 (L) 40.0 - 53.0 %    MCV 89 78 - 100 fL    MCH 27.8 26.5 - 33.0 pg    MCHC 31.4 (L) 31.5 - 36.5 g/dL    RDW 19.6 (H) 10.0 - 15.0 %    Platelet Count 227 150 - 450 10e3/uL    % Neutrophils 95 %    % Lymphocytes 3 %    % Monocytes 1 %    % Eosinophils 0 %    % Basophils 0 %    % Immature Granulocytes 1 %    NRBCs per 100 WBC 0 <1 /100    Absolute Neutrophils 4.2 1.6 - 8.3 10e3/uL    Absolute Lymphocytes 0.1 (L) 0.8 - 5.3 10e3/uL    Absolute Monocytes 0.1 0.0 - 1.3 10e3/uL    Absolute Eosinophils 0.0 0.0 - 0.7 10e3/uL    Absolute Basophils 0.0 0.0 - 0.2 10e3/uL    Absolute Immature Granulocytes 0.0 <=0.4 10e3/uL    Absolute NRBCs 0.0 10e3/uL   Extra Tube    Narrative    The following orders were created for panel order Extra Tube.  Procedure                               Abnormality         Status                     ---------                               -----------         ------                     Extra Blue Top Tube[[id number]]                              Final result                 Please view results for these tests on the individual  orders.   Extra Blue Top Tube   Result Value Ref Range    Hold Specimen JIC    XR Chest 2 Views    Narrative    EXAM: XR CHEST 2 VIEWS  LOCATION: [facility]  DATE: 1/22/2025    INDICATION: Shortness of breath.  COMPARISON: Chest radiograph 1/6/2025. CT chest 1/6/2025.      Impression    IMPRESSION:     Cardiomegaly with central pulmonary vascular congestion, mid and lower lung predominant mild interstitial edema, and trace bilateral pleural effusions, similar to 1/6/2025. No new airspace opacities. No pneumothorax.    Aortic atherosclerotic calcifications. Unchanged median sternotomy wires, spinal stimulator leads, mediastinal clips, and left humeral screws. An additional electronic device overlying the mediastinum on the lateral view does not have a correlate on the   frontal view and is likely within the overlying structures.   UA with Microscopic reflex to Culture    Specimen: Urine, NOS   Result Value Ref Range    Color Urine Yellow Colorless, Straw, Light Yellow, Yellow    Appearance Urine Clear Clear    Glucose Urine Negative Negative mg/dL    Bilirubin Urine Negative Negative    Ketones Urine Negative Negative mg/dL    Specific Gravity Urine 1.014 1.001 - 1.030    Blood Urine Negative Negative    pH Urine 7.0 5.0 - 7.0    Protein Albumin Urine 100 (A) Negative mg/dL    Urobilinogen Urine <2.0 <2.0 mg/dL    Nitrite Urine Negative Negative    Leukocyte Esterase Urine Negative Negative    Mucus Urine Present (A) None Seen /LPF    RBC Urine 1 <=2 /HPF    WBC Urine 2 <=5 /HPF    Squamous Epithelials Urine <1 <=1 /HPF    Hyaline Casts Urine 4 (H) <=2 /LPF    Narrative    Urine Culture not indicated   Lactic acid whole blood   Result Value Ref Range    Lactic Acid 3.8 (H) 0.7 - 2.0 mmol/L   Troponin T, High Sensitivity   Result Value Ref Range    Troponin T, High Sensitivity 40 (H) <=22 ng/L   US Abdomen Limited (RUQ)    Narrative    EXAM: US ABDOMEN LIMITED  LOCATION: [facility]  DATE: 1/22/2025    INDICATION: fever. History of biliary stent.  COMPARISON: CT 1/6/2025  TECHNIQUE: Limited abdominal ultrasound.    FINDINGS:    GALLBLADDER: There is gallbladder sludge. No stones seen. There is diffuse gallbladder wall thickening up to 4 mm. Trace pericholecystic fluid. No sonographic Garcia's.    BILE DUCTS: Extra and intrahepatic bile duct dilation. The common duct measures up to 15 mm.    LIVER: Enlarged measuring 21.3 cm MCL, craniocaudal. Coarsened echotexture, suggesting underlying fibrosis. Nodular contour. Diffuse hepatic fatty infiltration. No focal mass.    RIGHT KIDNEY: No hydronephrosis.    PANCREAS: Mostly obscured by gas.    No ascites.      Impression    IMPRESSION:  1.  Enlarged echogenic liver could represent steatohepatitis.  2.  Nodular surface contour consistent with fibrosis.  3.  Gallbladder wall thickening which could be due to acute or chronic cholecystitis, and cirrhosis.  4.  Bile duct dilation.         *Note: Due to a large number of results and/or encounters for the requested time period, some results have not been displayed. A complete set of results can be found in Results Review.           Margarita Jennings PA-C  [facility]  Surgery [address] [address]?  Office: [phone]

## 2025-01-23 ENCOUNTER — ANESTHESIA EVENT (OUTPATIENT)
Dept: SURGERY | Facility: HOSPITAL | Age: 80
End: 2025-01-23
Payer: COMMERCIAL

## 2025-01-23 ENCOUNTER — ANESTHESIA (OUTPATIENT)
Dept: SURGERY | Facility: HOSPITAL | Age: 80
End: 2025-01-23
Payer: COMMERCIAL

## 2025-01-23 PROBLEM — I50.9 CHF (CONGESTIVE HEART FAILURE) (H): Status: ACTIVE | Noted: 2024-09-18

## 2025-01-23 LAB
ACINETOBACTER SPECIES: NOT DETECTED
ALBUMIN SERPL BCG-MCNC: 3 G/DL (ref 3.5–5.2)
ALP SERPL-CCNC: 299 U/L (ref 40–150)
ALT SERPL W P-5'-P-CCNC: 74 U/L (ref 0–70)
ANION GAP SERPL CALCULATED.3IONS-SCNC: 15 MMOL/L (ref 7–15)
AST SERPL W P-5'-P-CCNC: 103 U/L (ref 0–45)
BACTERIA BLD CULT: ABNORMAL
BASOPHILS # BLD AUTO: 0 10E3/UL (ref 0–0.2)
BASOPHILS NFR BLD AUTO: 0 %
BILIRUB SERPL-MCNC: 2.9 MG/DL
BUN SERPL-MCNC: 37.2 MG/DL (ref 8–23)
CALCIUM SERPL-MCNC: 9 MG/DL (ref 8.8–10.4)
CHLORIDE SERPL-SCNC: 101 MMOL/L (ref 98–107)
CITROBACTER SPECIES: NOT DETECTED
CREAT SERPL-MCNC: 1.46 MG/DL (ref 0.67–1.17)
CTX-M: NORMAL
EGFRCR SERPLBLD CKD-EPI 2021: 48 ML/MIN/1.73M2
ENTEROBACTER SPECIES: NOT DETECTED
EOSINOPHIL # BLD AUTO: 0 10E3/UL (ref 0–0.7)
EOSINOPHIL NFR BLD AUTO: 0 %
ERCP: NORMAL
ERYTHROCYTE [DISTWIDTH] IN BLOOD BY AUTOMATED COUNT: 20.1 % (ref 10–15)
ESCHERICHIA COLI: NOT DETECTED
GLUCOSE BLDC GLUCOMTR-MCNC: 113 MG/DL (ref 70–99)
GLUCOSE BLDC GLUCOMTR-MCNC: 120 MG/DL (ref 70–99)
GLUCOSE BLDC GLUCOMTR-MCNC: 123 MG/DL (ref 70–99)
GLUCOSE BLDC GLUCOMTR-MCNC: 126 MG/DL (ref 70–99)
GLUCOSE BLDC GLUCOMTR-MCNC: 148 MG/DL (ref 70–99)
GLUCOSE BLDC GLUCOMTR-MCNC: 184 MG/DL (ref 70–99)
GLUCOSE SERPL-MCNC: 137 MG/DL (ref 70–99)
HCO3 SERPL-SCNC: 23 MMOL/L (ref 22–29)
HCT VFR BLD AUTO: 31.5 % (ref 40–53)
HGB BLD-MCNC: 9.9 G/DL (ref 13.3–17.7)
IMM GRANULOCYTES # BLD: 0.3 10E3/UL
IMM GRANULOCYTES NFR BLD: 2 %
IMP: NORMAL
KLEBSIELLA OXYTOCA: NOT DETECTED
KLEBSIELLA PNEUMONIAE: NOT DETECTED
KPC: NORMAL
LACTATE SERPL-SCNC: 1.8 MMOL/L (ref 0.7–2)
LYMPHOCYTES # BLD AUTO: 0.6 10E3/UL (ref 0.8–5.3)
LYMPHOCYTES NFR BLD AUTO: 4 %
MCH RBC QN AUTO: 28.1 PG (ref 26.5–33)
MCHC RBC AUTO-ENTMCNC: 31.4 G/DL (ref 31.5–36.5)
MCV RBC AUTO: 90 FL (ref 78–100)
MONOCYTES # BLD AUTO: 0.9 10E3/UL (ref 0–1.3)
MONOCYTES NFR BLD AUTO: 6 %
NDM: NORMAL
NEUTROPHILS # BLD AUTO: 12.3 10E3/UL (ref 1.6–8.3)
NEUTROPHILS NFR BLD AUTO: 87 %
NRBC # BLD AUTO: 0 10E3/UL
NRBC BLD AUTO-RTO: 0 /100
OXA (DETECTED/NOT DETECTED): NORMAL
PLATELET # BLD AUTO: 195 10E3/UL (ref 150–450)
POTASSIUM SERPL-SCNC: 4.1 MMOL/L (ref 3.4–5.3)
PROCALCITONIN SERPL IA-MCNC: 46.53 NG/ML
PROT SERPL-MCNC: 6.4 G/DL (ref 6.4–8.3)
PROTEUS SPECIES: NOT DETECTED
PSA SERPL DL<=0.01 NG/ML-MCNC: 0.13 NG/ML
PSEUDOMONAS AERUGINOSA: NOT DETECTED
RBC # BLD AUTO: 3.52 10E6/UL (ref 4.4–5.9)
SODIUM SERPL-SCNC: 139 MMOL/L (ref 135–145)
VIM: NORMAL
WBC # BLD AUTO: 14.2 10E3/UL (ref 4–11)

## 2025-01-23 PROCEDURE — 88305 TISSUE EXAM BY PATHOLOGIST: CPT | Mod: TC | Performed by: INTERNAL MEDICINE

## 2025-01-23 PROCEDURE — 250N000013 HC RX MED GY IP 250 OP 250 PS 637: Performed by: INTERNAL MEDICINE

## 2025-01-23 PROCEDURE — 84145 PROCALCITONIN (PCT): CPT | Performed by: INTERNAL MEDICINE

## 2025-01-23 PROCEDURE — 250N000011 HC RX IP 250 OP 636: Performed by: NURSE ANESTHETIST, CERTIFIED REGISTERED

## 2025-01-23 PROCEDURE — 999N000157 HC STATISTIC RCP TIME EA 10 MIN

## 2025-01-23 PROCEDURE — C1726 CATH, BAL DIL, NON-VASCULAR: HCPCS | Performed by: INTERNAL MEDICINE

## 2025-01-23 PROCEDURE — 272N000001 HC OR GENERAL SUPPLY STERILE: Performed by: INTERNAL MEDICINE

## 2025-01-23 PROCEDURE — 0FC98ZZ EXTIRPATION OF MATTER FROM COMMON BILE DUCT, VIA NATURAL OR ARTIFICIAL OPENING ENDOSCOPIC: ICD-10-PCS | Performed by: INTERNAL MEDICINE

## 2025-01-23 PROCEDURE — C1769 GUIDE WIRE: HCPCS | Performed by: INTERNAL MEDICINE

## 2025-01-23 PROCEDURE — C2625 STENT, NON-COR, TEM W/DEL SY: HCPCS | Performed by: INTERNAL MEDICINE

## 2025-01-23 PROCEDURE — 0F798DZ DILATION OF COMMON BILE DUCT WITH INTRALUMINAL DEVICE, VIA NATURAL OR ARTIFICIAL OPENING ENDOSCOPIC: ICD-10-PCS | Performed by: INTERNAL MEDICINE

## 2025-01-23 PROCEDURE — 250N000011 HC RX IP 250 OP 636: Performed by: INTERNAL MEDICINE

## 2025-01-23 PROCEDURE — 0FPB8DZ REMOVAL OF INTRALUMINAL DEVICE FROM HEPATOBILIARY DUCT, VIA NATURAL OR ARTIFICIAL OPENING ENDOSCOPIC: ICD-10-PCS | Performed by: INTERNAL MEDICINE

## 2025-01-23 PROCEDURE — 88112 CYTOPATH CELL ENHANCE TECH: CPT | Mod: 26 | Performed by: PATHOLOGY

## 2025-01-23 PROCEDURE — 36415 COLL VENOUS BLD VENIPUNCTURE: CPT | Performed by: INTERNAL MEDICINE

## 2025-01-23 PROCEDURE — 250N000012 HC RX MED GY IP 250 OP 636 PS 637: Performed by: INTERNAL MEDICINE

## 2025-01-23 PROCEDURE — 83605 ASSAY OF LACTIC ACID: CPT | Performed by: INTERNAL MEDICINE

## 2025-01-23 PROCEDURE — 88112 CYTOPATH CELL ENHANCE TECH: CPT | Mod: TC | Performed by: INTERNAL MEDICINE

## 2025-01-23 PROCEDURE — 88305 TISSUE EXAM BY PATHOLOGIST: CPT | Mod: 26 | Performed by: PATHOLOGY

## 2025-01-23 PROCEDURE — 255N000002 HC RX 255 OP 636: Performed by: INTERNAL MEDICINE

## 2025-01-23 PROCEDURE — 0FD98ZX EXTRACTION OF COMMON BILE DUCT, VIA NATURAL OR ARTIFICIAL OPENING ENDOSCOPIC, DIAGNOSTIC: ICD-10-PCS | Performed by: INTERNAL MEDICINE

## 2025-01-23 PROCEDURE — 258N000003 HC RX IP 258 OP 636: Performed by: INTERNAL MEDICINE

## 2025-01-23 PROCEDURE — 82310 ASSAY OF CALCIUM: CPT | Performed by: INTERNAL MEDICINE

## 2025-01-23 PROCEDURE — 710N000009 HC RECOVERY PHASE 1, LEVEL 1, PER MIN: Performed by: INTERNAL MEDICINE

## 2025-01-23 PROCEDURE — 250N000025 HC SEVOFLURANE, PER MIN: Performed by: INTERNAL MEDICINE

## 2025-01-23 PROCEDURE — 360N000075 HC SURGERY LEVEL 2, PER MIN: Performed by: INTERNAL MEDICINE

## 2025-01-23 PROCEDURE — 99231 SBSQ HOSP IP/OBS SF/LOW 25: CPT | Performed by: SPECIALIST

## 2025-01-23 PROCEDURE — 258N000003 HC RX IP 258 OP 636: Performed by: NURSE ANESTHETIST, CERTIFIED REGISTERED

## 2025-01-23 PROCEDURE — 370N000017 HC ANESTHESIA TECHNICAL FEE, PER MIN: Performed by: INTERNAL MEDICINE

## 2025-01-23 PROCEDURE — 999N000141 HC STATISTIC PRE-PROCEDURE NURSING ASSESSMENT: Performed by: INTERNAL MEDICINE

## 2025-01-23 PROCEDURE — 120N000004 HC R&B MS OVERFLOW

## 2025-01-23 PROCEDURE — 82040 ASSAY OF SERUM ALBUMIN: CPT | Performed by: INTERNAL MEDICINE

## 2025-01-23 PROCEDURE — 84153 ASSAY OF PSA TOTAL: CPT | Performed by: INTERNAL MEDICINE

## 2025-01-23 PROCEDURE — 250N000009 HC RX 250: Performed by: NURSE ANESTHETIST, CERTIFIED REGISTERED

## 2025-01-23 PROCEDURE — 85025 COMPLETE CBC W/AUTO DIFF WBC: CPT | Performed by: INTERNAL MEDICINE

## 2025-01-23 PROCEDURE — 99233 SBSQ HOSP IP/OBS HIGH 50: CPT | Performed by: INTERNAL MEDICINE

## 2025-01-23 DEVICE — BILIARY STENT WITH NAVIFLEXTM RX DELIVERY SYSTEM
Type: IMPLANTABLE DEVICE | Status: FUNCTIONAL
Brand: ADVANIX™ BILIARY

## 2025-01-23 RX ORDER — SODIUM CHLORIDE, SODIUM LACTATE, POTASSIUM CHLORIDE, CALCIUM CHLORIDE 600; 310; 30; 20 MG/100ML; MG/100ML; MG/100ML; MG/100ML
INJECTION, SOLUTION INTRAVENOUS CONTINUOUS
Status: DISCONTINUED | OUTPATIENT
Start: 2025-01-23 | End: 2025-01-23 | Stop reason: HOSPADM

## 2025-01-23 RX ORDER — PROCHLORPERAZINE MALEATE 5 MG/1
5 TABLET ORAL EVERY 6 HOURS PRN
Status: DISCONTINUED | OUTPATIENT
Start: 2025-01-23 | End: 2025-01-26 | Stop reason: HOSPADM

## 2025-01-23 RX ORDER — ONDANSETRON 4 MG/1
4 TABLET, ORALLY DISINTEGRATING ORAL EVERY 30 MIN PRN
Status: DISCONTINUED | OUTPATIENT
Start: 2025-01-23 | End: 2025-01-23 | Stop reason: HOSPADM

## 2025-01-23 RX ORDER — FUROSEMIDE 10 MG/ML
20 INJECTION INTRAMUSCULAR; INTRAVENOUS
Status: DISCONTINUED | OUTPATIENT
Start: 2025-01-23 | End: 2025-01-26 | Stop reason: HOSPADM

## 2025-01-23 RX ORDER — ASPIRIN 81 MG/1
162 TABLET, CHEWABLE ORAL DAILY
Status: DISCONTINUED | OUTPATIENT
Start: 2025-01-23 | End: 2025-01-26 | Stop reason: HOSPADM

## 2025-01-23 RX ORDER — ONDANSETRON 2 MG/ML
4 INJECTION INTRAMUSCULAR; INTRAVENOUS EVERY 6 HOURS PRN
Status: DISCONTINUED | OUTPATIENT
Start: 2025-01-23 | End: 2025-01-26 | Stop reason: HOSPADM

## 2025-01-23 RX ORDER — SODIUM CHLORIDE, SODIUM LACTATE, POTASSIUM CHLORIDE, CALCIUM CHLORIDE 600; 310; 30; 20 MG/100ML; MG/100ML; MG/100ML; MG/100ML
INJECTION, SOLUTION INTRAVENOUS CONTINUOUS PRN
Status: DISCONTINUED | OUTPATIENT
Start: 2025-01-23 | End: 2025-01-23

## 2025-01-23 RX ORDER — FENTANYL CITRATE 50 UG/ML
50 INJECTION, SOLUTION INTRAMUSCULAR; INTRAVENOUS EVERY 5 MIN PRN
Status: DISCONTINUED | OUTPATIENT
Start: 2025-01-23 | End: 2025-01-23 | Stop reason: HOSPADM

## 2025-01-23 RX ORDER — FLUTICASONE PROPIONATE 50 MCG
2 SPRAY, SUSPENSION (ML) NASAL DAILY
COMMUNITY

## 2025-01-23 RX ORDER — PROPOFOL 10 MG/ML
INJECTION, EMULSION INTRAVENOUS PRN
Status: DISCONTINUED | OUTPATIENT
Start: 2025-01-23 | End: 2025-01-23

## 2025-01-23 RX ORDER — METOPROLOL SUCCINATE 100 MG/1
100 TABLET, EXTENDED RELEASE ORAL DAILY
Status: DISCONTINUED | OUTPATIENT
Start: 2025-01-23 | End: 2025-01-26 | Stop reason: HOSPADM

## 2025-01-23 RX ORDER — HYDROMORPHONE HCL IN WATER/PF 6 MG/30 ML
0.2 PATIENT CONTROLLED ANALGESIA SYRINGE INTRAVENOUS EVERY 5 MIN PRN
Status: DISCONTINUED | OUTPATIENT
Start: 2025-01-23 | End: 2025-01-23 | Stop reason: HOSPADM

## 2025-01-23 RX ORDER — FLUTICASONE PROPIONATE 50 MCG
2 SPRAY, SUSPENSION (ML) NASAL DAILY
Status: DISCONTINUED | OUTPATIENT
Start: 2025-01-24 | End: 2025-01-23

## 2025-01-23 RX ORDER — ONDANSETRON 4 MG/1
4 TABLET, ORALLY DISINTEGRATING ORAL EVERY 6 HOURS PRN
Status: DISCONTINUED | OUTPATIENT
Start: 2025-01-23 | End: 2025-01-26 | Stop reason: HOSPADM

## 2025-01-23 RX ORDER — ONDANSETRON 2 MG/ML
INJECTION INTRAMUSCULAR; INTRAVENOUS PRN
Status: DISCONTINUED | OUTPATIENT
Start: 2025-01-23 | End: 2025-01-23

## 2025-01-23 RX ORDER — GABAPENTIN 300 MG/1
300 CAPSULE ORAL 3 TIMES DAILY PRN
Status: DISCONTINUED | OUTPATIENT
Start: 2025-01-23 | End: 2025-01-26 | Stop reason: HOSPADM

## 2025-01-23 RX ORDER — NALOXONE HYDROCHLORIDE 0.4 MG/ML
0.1 INJECTION, SOLUTION INTRAMUSCULAR; INTRAVENOUS; SUBCUTANEOUS
Status: DISCONTINUED | OUTPATIENT
Start: 2025-01-23 | End: 2025-01-23 | Stop reason: HOSPADM

## 2025-01-23 RX ORDER — FLUTICASONE PROPIONATE 50 MCG
2 SPRAY, SUSPENSION (ML) NASAL DAILY
Status: DISCONTINUED | OUTPATIENT
Start: 2025-01-23 | End: 2025-01-24

## 2025-01-23 RX ORDER — ONDANSETRON 2 MG/ML
4 INJECTION INTRAMUSCULAR; INTRAVENOUS EVERY 30 MIN PRN
Status: DISCONTINUED | OUTPATIENT
Start: 2025-01-23 | End: 2025-01-23 | Stop reason: HOSPADM

## 2025-01-23 RX ORDER — LIDOCAINE 40 MG/G
CREAM TOPICAL
Status: DISCONTINUED | OUTPATIENT
Start: 2025-01-23 | End: 2025-01-26 | Stop reason: HOSPADM

## 2025-01-23 RX ORDER — FENTANYL CITRATE 50 UG/ML
25 INJECTION, SOLUTION INTRAMUSCULAR; INTRAVENOUS EVERY 5 MIN PRN
Status: DISCONTINUED | OUTPATIENT
Start: 2025-01-23 | End: 2025-01-23 | Stop reason: HOSPADM

## 2025-01-23 RX ORDER — LIDOCAINE HYDROCHLORIDE 20 MG/ML
INJECTION, SOLUTION INFILTRATION; PERINEURAL PRN
Status: DISCONTINUED | OUTPATIENT
Start: 2025-01-23 | End: 2025-01-23

## 2025-01-23 RX ORDER — FENTANYL CITRATE 50 UG/ML
INJECTION, SOLUTION INTRAMUSCULAR; INTRAVENOUS PRN
Status: DISCONTINUED | OUTPATIENT
Start: 2025-01-23 | End: 2025-01-23

## 2025-01-23 RX ORDER — DEXAMETHASONE SODIUM PHOSPHATE 4 MG/ML
4 INJECTION, SOLUTION INTRA-ARTICULAR; INTRALESIONAL; INTRAMUSCULAR; INTRAVENOUS; SOFT TISSUE
Status: DISCONTINUED | OUTPATIENT
Start: 2025-01-23 | End: 2025-01-23 | Stop reason: HOSPADM

## 2025-01-23 RX ORDER — HYDROMORPHONE HCL IN WATER/PF 6 MG/30 ML
0.4 PATIENT CONTROLLED ANALGESIA SYRINGE INTRAVENOUS EVERY 5 MIN PRN
Status: DISCONTINUED | OUTPATIENT
Start: 2025-01-23 | End: 2025-01-23 | Stop reason: HOSPADM

## 2025-01-23 RX ORDER — FLUMAZENIL 0.1 MG/ML
0.2 INJECTION, SOLUTION INTRAVENOUS
Status: DISCONTINUED | OUTPATIENT
Start: 2025-01-23 | End: 2025-01-24

## 2025-01-23 RX ADMIN — PROPOFOL 120 MG: 10 INJECTION, EMULSION INTRAVENOUS at 14:43

## 2025-01-23 RX ADMIN — MEROPENEM 1 G: 1 INJECTION, POWDER, FOR SOLUTION INTRAVENOUS at 11:55

## 2025-01-23 RX ADMIN — SODIUM CHLORIDE: 9 INJECTION, SOLUTION INTRAVENOUS at 16:42

## 2025-01-23 RX ADMIN — INSULIN GLARGINE 18 UNITS: 100 INJECTION, SOLUTION SUBCUTANEOUS at 20:08

## 2025-01-23 RX ADMIN — MEROPENEM 1 G: 1 INJECTION, POWDER, FOR SOLUTION INTRAVENOUS at 19:22

## 2025-01-23 RX ADMIN — METOPROLOL TARTRATE 12.5 MG: 25 TABLET, FILM COATED ORAL at 20:07

## 2025-01-23 RX ADMIN — ASPIRIN 81 MG CHEWABLE TABLET 162 MG: 81 TABLET CHEWABLE at 09:04

## 2025-01-23 RX ADMIN — FAMOTIDINE 20 MG: 10 INJECTION, SOLUTION INTRAVENOUS at 21:13

## 2025-01-23 RX ADMIN — Medication 2 SPRAY: at 06:25

## 2025-01-23 RX ADMIN — LIDOCAINE HYDROCHLORIDE 3 ML: 20 INJECTION, SOLUTION INFILTRATION; PERINEURAL at 14:43

## 2025-01-23 RX ADMIN — ROCURONIUM BROMIDE 30 MG: 50 INJECTION, SOLUTION INTRAVENOUS at 14:43

## 2025-01-23 RX ADMIN — MEROPENEM 1 G: 1 INJECTION, POWDER, FOR SOLUTION INTRAVENOUS at 03:48

## 2025-01-23 RX ADMIN — INSULIN ASPART 1 UNITS: 100 INJECTION, SOLUTION INTRAVENOUS; SUBCUTANEOUS at 03:52

## 2025-01-23 RX ADMIN — ONDANSETRON 4 MG: 2 INJECTION INTRAMUSCULAR; INTRAVENOUS at 14:51

## 2025-01-23 RX ADMIN — FENTANYL CITRATE 50 MCG: 50 INJECTION, SOLUTION INTRAMUSCULAR; INTRAVENOUS at 14:43

## 2025-01-23 RX ADMIN — FENTANYL CITRATE 50 MCG: 50 INJECTION, SOLUTION INTRAMUSCULAR; INTRAVENOUS at 14:35

## 2025-01-23 RX ADMIN — SUGAMMADEX 200 MG: 100 INJECTION, SOLUTION INTRAVENOUS at 15:09

## 2025-01-23 RX ADMIN — METOPROLOL TARTRATE 12.5 MG: 25 TABLET, FILM COATED ORAL at 09:04

## 2025-01-23 RX ADMIN — SODIUM CHLORIDE, POTASSIUM CHLORIDE, SODIUM LACTATE AND CALCIUM CHLORIDE: 600; 310; 30; 20 INJECTION, SOLUTION INTRAVENOUS at 14:31

## 2025-01-23 ASSESSMENT — ACTIVITIES OF DAILY LIVING (ADL)
ADLS_ACUITY_SCORE: 49
ADLS_ACUITY_SCORE: 49
ADLS_ACUITY_SCORE: 48
ADLS_ACUITY_SCORE: 48
ADLS_ACUITY_SCORE: 49
ADLS_ACUITY_SCORE: 49
ADLS_ACUITY_SCORE: 48
ADLS_ACUITY_SCORE: 48
ADLS_ACUITY_SCORE: 49
ADLS_ACUITY_SCORE: 48
ADLS_ACUITY_SCORE: 49
ADLS_ACUITY_SCORE: 48
ADLS_ACUITY_SCORE: 49
ADLS_ACUITY_SCORE: 48
ADLS_ACUITY_SCORE: 49
ADLS_ACUITY_SCORE: 48
ADLS_ACUITY_SCORE: 48

## 2025-01-23 NOTE — PLAN OF CARE
Goal Outcome Evaluation:      Plan of Care Reviewed With: patient    Overall Patient Progress: no changeOverall Patient Progress: no change         NPO, IVF. On 2 liters nasal cannula. Lungs clear and diminished. IV antibiotics. Alert and oriented. Possible ERCP today. Being treated for sepsis. Barnes catheter. Able to make needs known. Renetta Delong RN

## 2025-01-23 NOTE — PROGRESS NOTES
Overall, feeling better today than he was yesterday.  Afebrile, vital signs stable.    Physical exam:  Looks comfortable.  No scleral icterus.  Abdomen is soft, minimal epigastric tenderness.    Laboratories:  White blood count 14.2  Lactic acid 1.8  Total bili 2.9    Impression: Gentleman with a common bile duct stricture.  Previous brushings were normal.  Now on ultrasound he has a somewhat thickened gallbladder wall but no stones.  I suspect that what we see in his gallbladder is just related to the entire biliary tree being backed up.  Will await the results of the ERCP before making a decision on whether or not his gallbladder needs to come out.  His BNP is melia high also which could also very much contribute to a thickened gallbladder wall.

## 2025-01-23 NOTE — PLAN OF CARE
Goal Outcome Evaluation:    Vital signs stable. Weaned to room air. RUQ pain with palpation. Denied pain otherwise. Barnes in place, draining well.     NPO for ERCP. Patient's son brought in remote for spinal stimulator. Left floor for ERCP at 1330 with spinal stimulator remote.

## 2025-01-23 NOTE — H&P
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      Patient Active Problem List   Diagnosis    Ventral hernia    Chronic rhinitis    Hypertrophy of prostate with urinary obstruction    Transient cerebral ischemia    Hypertension    CARDIOVASCULAR SCREENING; LDL GOAL LESS THAN 100    Gout    Type 2 diabetes, HbA1c goal < 7% (H)    Cervicalgia    Severe sepsis (H)    Amputated toe, left    Type 2 diabetes mellitus with peripheral vascular disease (H)    Total knee replacement status    Dyspnea on exertion    Chest pain, unspecified type    Cardiomyopathy, unspecified type (H)    Shortness of breath    Renal colic    Obesity (BMI 35.0-39.9) with comorbidity (H)    Stage 3b chronic kidney disease (H)    Ulcerated, foot (H)    Ulcerated, foot, unspecified laterality, limited to breakdown of skin (H)    Acute renal insufficiency    Acute right-sided thoracic back pain    Other constipation    Acute idiopathic gout of right hand    H/O amputation of lesser toe, left    Lower urinary tract symptoms    Gouty arthropathy    Skin rash    Cellulitis of right lower extremity    Diabetic ulcer of toe of right foot associated with diabetes mellitus due to underlying condition, limited to breakdown of skin (H)    Slowing of urinary stream    Nocturia    Muscle pain    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    Diabetic  neuropathy associated with type 2 diabetes mellitus (H)    Chronic low back pain    Cellulitis and abscess of foot excluding toe    Chronic systolic congestive heart failure (H)    Coronary artery disease involving native coronary artery of native heart without angina pectoris    Enlarged prostate    Hyperglycemia    Elevated LFTs    Elevated lipase    Diffuse papular rash    Abdominal pain    Hypoxia    Fall, initial encounter    Bacteremia    Acute cystitis without hematuria    Back pain    Cerebrovascular accident (CVA) due to bilateral embolism of posterior cerebral arteries (H)    Hypertensive heart and kidney disease    Insomnia    Iron deficiency anemia    Osteoarthritis of left knee    Post-traumatic osteoarthritis    Acute pain of right shoulder    Fever, unspecified fever cause    Anemia, unspecified    Chronic kidney disease, stage 1    Extradural and subdural abscess, unspecified    Spinal stenosis, site unspecified    Biliary obstruction (H)    Common bile duct obstruction (H)    Muscle weakness (generalized)    Weakness    NSTEMI (non-ST elevated myocardial infarction) (H)    ST elevation MI (STEMI) (H)    CHF (congestive heart failure) (H)    Urinary frequency    Cholangitis (H)        Sepsis:  Due to acute cholangitis.  Continue close monitoring of vital signs and laboratory values.  Continue current antibiotic regimen with meropenem, as blood cultures have shown gram-negative rods.  Re-evaluate antibiotic therapy based on culture sensitivities and clinical response.  Noted patient was given ertapenem, Flagyl and ciprofloxacin at [facility].  Antibiotics were simplified at [facility].    Acute Hypoxemic Respiratory Failure:  Administer supplemental oxygen as needed to maintain oxygen saturation greater than 93%.  Monitor respiratory status closely, adjusting oxygen therapy as required.  Due to acute CHF exacerbation.  Lasix as needed ordered  Patient is a full code.  He was on 2 L of oxygen when  seen.    Congestive Heart Failure (CHF) Exacerbation:  Administer Lasix (furosemide) 20 mg as needed for shortness of breath, with careful monitoring of fluid status.  Monitor fluid intake and output meticulously.  Use IV fluids cautiously due to the risk of exacerbating metabolic acidosis.      Acute Cholangitis:  Continue meropenem therapy based on current culture results.  Monitor for any changes in pain or clinical status, with pain currently rated at 2 out of 10.  Noted a prior history of common bile duct stricture.  GI consulted.  Appreciate input    Type II Diabetes Mellitus:  Perform regular Accu-Chek blood glucose monitoring.  Utilize a sliding scale insulin regimen to maintain blood glucose within the target range of 100-140 mg/dL.  Implement hypoglycemia protocol as needed.  Blood sugar goal 100-1 40    Hypertension:  Hold lisinopril and reduce the dose of metoprolol temporarily, given the systolic blood pressure readings in the 130s.  Monitor blood pressure closely and adjust medications as necessary.  Hold metoprolol, spironolactone, Bumex    Chronic Back Pain:  Provide pain control as needed, adjusting analgesic therapy based on patient response and pain levels.      History of Cerebrovascular Accident (CVA):  Initiate Physical and Occupational Therapy (PT/OT) as soon as the patient is stable and able to participate.  History of Congestive Heart Failure:    Continue Lasix 20 mg as needed for shortness of breath.  Monitor ejection fraction, currently at 20-24%, and adjust treatment as necessary.  Use IV fluids cautiously, with close monitoring of fluid balance.    Lactic Acidosis:  Continue monitoring, as the condition is improving.    Chronic Kidney Disease (CKD) Stage III:  Avoid nephrotoxic medications to prevent further renal impairment.  Monitor renal function regularly.    Gouty Arthritis: Allopurinol and ursodiol on hold    Insomnia/depression-hold trazodone    Provide pain control as needed,  ensuring medications do not adversely affect renal function.  Additional Notes:    The rest of the patient's medical management remains unchanged.  This plan of care has been discussed with the patient, and adjustments will be made based on their clinical progress and any new findings.       Diet:  NPO  DVT Prophylaxis: Pneumatic Compression Devices  Barnes Catheter: PRESENT, indication:    Lines: None     Cardiac Monitoring: None  Code Status:  Full code    Clinically Significant Risk Factors Present on Admission               # Hypoalbuminemia: Lowest albumin = 3.4 g/dL at 1/22/2025 11:42 AM, will monitor as appropriate   # Drug Induced Platelet Defect: home medication list includes an antiplatelet medication   # Hypertension: Noted on problem list  # Chronic heart failure with reduced ejection fraction: last echo with EF <40%     # Anemia: based on hgb <11      # DMII: A1C = 9.1 % (Ref range: <5.7 %) within past 6 months    # Overweight: Estimated body mass index is 27.94 kg/m  as calculated from the following:    Height as of an earlier encounter on 1/22/25: 1.829 m (6').    Weight as of an earlier encounter on 1/22/25: 93.4 kg (206 lb).       # Financial/Environmental Concerns:     # History of CABG: noted on surgical history       Disposition Plan     Medically Ready for Discharge: Anticipated in 2-4 Days           Nelsy Vallejo MD  Hospitalist Service  [facility]  Securely message with [facility] (more info)  Text page via [facility] Paging/Directory     ______________________________________________________________________    Chief Complaint   Acute cholecystitis        History of Present Illness   Lance Ibrahim, an 79-year-old White male with a medical history significant for coronary artery disease status post coronary artery bypass grafting x3 in November 2024, chronic systolic CHF with ejection fraction 20 to 25% in November 2024,, type 2 diabetes mellitus, common bile duct stricture  status post ERCP in April 2024, chronic back and foot pain, benign prostatic hyperplasia, hypertension, hyperlipidemia, obesity, and a prior transient ischemic attack, and other medical comorbidities who is being admitted with acute cholangitis.    The patient was transferred from [facility] for an ERCP procedure following a diagnosis of acute cholangitis. He was initially brought to the [facility] Emergency Department from his assisted living facility, [facility], by [facility] due to a fever of 101 F, weakness, shortness of breath, and urinary frequency, all of which began on the day of admission. He denied experiencing any chest pain or significant pain elsewhere.    Upon arrival, his oxygen saturation was noted to be 89% on room air. After being instructed to take deep breaths, his saturation remained at 89%, necessitating the administration of 2L oxygen via an oxymask, which improved his saturation to 93%. Despite being a poor historian, he reported lower abdominal pain and urinary urgency. He had disconnected his Barnes catheter bag from the catheter. He endorsed having fever and chills but denied nausea or vomiting. He noted that his current abdominal pain was different from the pain he experienced during a previous admission. His pain was primarily located in the lower abdomen.    According to his medical records, he underwent an ERCP/EUS in April 2024 for a CBD stricture, during which biopsies were taken, and a stent was placed. The biopsies showed scant benign columnar epithelium and were negative for malignancy. A repeat ERCP was recommended within 10-12 weeks, although it is unclear if this was completed. His surgical history includes a ventral hernia repair in 2004.    In the emergency department, a workup indicated concern for cholangitis. After consulting with surgery and gastroenterology, it was determined that Mr. Ibrahim required transfer to a facility with ERCP capabilities. He was  started on ertapenem, flagy and cinoxacin while awaiting transfer.    During his stay, he developed an oxygen requirement of 2L nasal cannula. Upon reevaluation, he denied significant shortness of breath and showed no rales on pulmonary examination, although his fluid status was monitored closely due to his underlying heart failure. His oxygen saturation improved to 93% on room air, and he remained hemodynamically stable without Lasix.        Patient is a full code.  He has been transferred for ERCP by GI      TTE: 11/24    Interpretation Summary chronic      Left ventricular function is decreased. The ejection fraction is 20-25%  (severely reduced).  There is severe global hypokinesia of the left ventricle.  Moderately decreased right ventricular systolic function  There is mild (1+) aortic regurgitation.  Moderate aortic valve calcification is present.  There is no pericardial effusion.  IVC diameter >2.1 cm collapsing <50% with sniff suggests a high RA pressure  estimated at 15 mmHg or greater.  _________________________________       Past Medical History    Past Medical History:   Diagnosis Date    Anemia     Arthritis     osteoarthritis knees    BPH     CAD (coronary artery disease)     subtotal occlusion in the small distal LAD     Cardiomyopathy     Cerebral artery occlusion with cerebral infarction     TIA 1993, no residual    Cervicalgia     CHF (congestive heart failure) (H)     Chronic kidney disease     Chronic low back pain     Chronic rhinitis     Gouty arthropathy     hands    Hyperlipidemia     Hypertension     Kidney stone     Nocturia     Obesity     Osteomyelitis (H) 08/2023    Foot    PVD (peripheral vascular disease)     Sleep apnea     doesn't use cpap    Spinal cord stimulator status 04/2024    Spinal cord stimulator in place    TIA (transient ischaemic attack) 1993    Type 2 diabetes mellitus - 11/08/2018    Ureteral stone        Past Surgical History   Past Surgical History:   Procedure  Laterality Date    AMPUTATE FOOT Right 08/07/2023    Procedure: AMPUTATION, right hallux;  Surgeon: Nakul Salazar DPM;  Location: [facility] OR    AMPUTATE TOE(S) Left 10/15/2018    Procedure: AMPUTATE TOE(S);  Left third toe amputation;  Surgeon: Ayaka Azevedo DPM, Podiatry/Foot and Ankle Surgery;  Location: [facility] OR    ARTHROPLASTY KNEE Right 12/07/2018    Procedure: Right total knee arthroplasty;  Surgeon: Issa Cunha MD;  Location:  OR    COLONOSCOPY  03/09/2013    Procedure: COLONOSCOPY;  COLONOSCOPY;  Surgeon: Chau Hogan MD;  Location: [facility]    CORONARY ARTERY BYPASS GRAFT, WITH ENDOSCOPIC VESSEL PROCUREMENT N/A 11/18/2024    Procedure: CORONARY ARTERY BYPASS GRAFT TIMES THREE, WITH LEFT INTERNAL MAMARARY ARTERY HARVEST, LEFT ENDOSCOPIC VESSEL PROCUREMENT, EPIAORTIC ULTRASOUND;  Surgeon: Tsering Evans MD;  Location: [facility] OR    CV CORONARY ANGIOGRAM N/A 11/14/2024    Procedure: Coronary Angiogram;  Surgeon: Talon Lew MD;  Location: [facility] LAB CV    CV HEART CATHETERIZATION WITH POSSIBLE INTERVENTION Left 03/05/2019    Procedure: Coronary Angiogram;  Surgeon: Nas Linda MD;  Location:  HEART CARDIAC CATH LAB    CV INTRA AORTIC BALLOON N/A 11/16/2024    Procedure: Intra aortic Balloon Pump Insertion;  Surgeon: Jessica Yepez MD;  Location: [facility] CV    CV INTRAVASULAR ULTRASOUND N/A 11/14/2024    Procedure: Intravascular Ultrasound;  Surgeon: Talon Lew MD;  Location: [facility] LAB CV    CV LEFT HEART CATH N/A 11/14/2024    Procedure: Left Heart Catheterization;  Surgeon: Talon Lew MD;  Location: [facility] LAB CV    Diastasis recti repair  1985    ENDOSCOPIC RETROGRADE CHOLANGIOPANCREATOGRAM N/A 04/30/2024    Procedure: ENDOSCOPIC RETROGRADE CHOLANGIOPANCREATOGRAPHY, BILIARY SPHINCTEROTOMY, DILATION, BRUSHING, BIOPSIES with DISTAL EXTRA HEPATIC BILE DUCT STRICTURE;  Surgeon: Aldo Gongora MD;  Location:   [facility] OR    ESOPHAGOSCOPY, GASTROSCOPY, DUODENOSCOPY (EGD), COMBINED N/A 2024    Procedure: ESOPHAGOGASTRODUODENOSCOPY, WITH ENDOSCOPIC ULTRASOUND GUIDANCE;  Surgeon: Aldo Gongora MD;  Location: [facility] OR    EXTRACAPSULAR CATARACT EXTRATION WITH INTRAOCULAR LENS IMPLANT Left 2017    EXTRACAPSULAR CATARACT EXTRATION WITH INTRAOCULAR LENS IMPLANT Right     FOOT SURGERY  2013    cyst removal     HERNIA REPAIR  2004    ventral     IR DISC ASPIRATION INJECTION  2024    IRRIGATION AND DEBRIDEMENT SHOULDER, COMBINED Right 2024    Procedure: IRRIGATION AND DEBRIDEMENT, SHOULDER;  Surgeon: Terence Hanson MD;  Location: [facility] OR    IRRIGATION AND DEBRIDEMENT UPPER EXTREMITY, COMBINED Right 2024    Procedure: IRRIGATION AND DEBRIDEMENT, ELBOW AND THUMB;  Surgeon: Terence Hanson MD;  Location: [facility] OR    ORIF Shoulder Left     PROSTATE SURGERY  2024    Urolift    RESECT CLAVICLE DISTAL Right 2024    Procedure: EXCISION, CLAVICLE, DISTAL;  Surgeon: Terence Hanson MD;  Location: [facility] OR    SPINAL CORD STIMULATOR IMPLANT  2024    TRANSESOPHAGEAL ECHOCARDIOGRAM INTRAOPERATIVE N/A 2024    Procedure: ECHOCARDIOGRAM, TRANSESOPHAGEAL, INTRAOPERATIVE;  Surgeon: Tsering Evans MD;  Location: [facility] OR    Ventral hernia repair NOS  1987       Prior to Admission Medications   Prior to Admission Medications   Prescriptions Last Dose Informant Patient Reported? Taking?   Continuous Glucose Sensor (DEXCOM G7 SENSOR) MISC   No No   Si each every 10 days. Change sensor every 10 days   Ferrous Gluconate 324 (37.5 Fe) MG TABS   Yes No   Sig: Take 1 tablet by mouth daily.   White Petrolatum-Mineral Oil (LUBRIFRESH P.M.) OINT   No No   Sig: Apply 1 g to eye 2 times daily as needed for dry eyes.   acetaminophen (TYLENOL) 500 MG tablet   Yes No   Sig: Take 1,000 mg by mouth 3 times daily. TID while  awake and PRN nightly   allopurinol (ZYLOPRIM) 100 MG tablet   Yes No   Sig: Take 100 mg by mouth daily.   aspirin (ASA) 81 MG chewable tablet   No No   Sig: Take 2 tablets (162 mg) by mouth daily.   atorvastatin (LIPITOR) 40 MG tablet   No No   Sig: Take 1 tablet (40 mg) by mouth every evening.   blood glucose monitoring (NO BRAND SPECIFIED) meter device kit   No No   Sig: Use to test blood sugar 2 times daily or as directed.   bumetanide (BUMEX) 1 MG tablet   No No   Sig: Take 1 tablet (1 mg) by mouth daily.   calcium carbonate (TUMS) 500 MG chewable tablet   Yes No   Sig: Take 1,000 mg by mouth 3 times daily as needed for heartburn   diclofenac (VOLTAREN) 1 % topical gel   Yes No   Sig: Apply 2 g topically 2 times daily as needed for moderate pain.   finasteride (PROSCAR) 5 MG tablet   Yes No   Sig: Take 5 mg by mouth daily   gabapentin (NEURONTIN) 300 MG capsule   No No   Sig: Take 1 capsule (300 mg) by mouth 3 times daily as needed.   glipiZIDE (GLUCOTROL XL) 2.5 MG 24 hr tablet   No No   Sig: Take 1 tablet (2.5 mg) by mouth daily.   insulin glargine (LANTUS PEN) 100 UNIT/ML pen   Yes No   Sig: Inject 18 Units subcutaneously 2 times daily.   lisinopril (ZESTRIL) 30 MG tablet   No No   Sig: Take 1 tablet (30 mg) by mouth daily.   metFORMIN (GLUCOPHAGE XR) 500 MG 24 hr tablet   No No   Sig: Take 1 tablet (500 mg) by mouth daily (with dinner).   metoprolol succinate ER (TOPROL XL) 100 MG 24 hr tablet   No No   Sig: Take 1 tablet (100 mg) by mouth daily.   pantoprazole (PROTONIX) 40 MG EC tablet   No No   Sig: Take 1 tablet (40 mg) by mouth every morning (before breakfast).   sodium bicarbonate 650 MG tablet   No No   Sig: Take 3 tablets (1,950 mg) by mouth 3 times daily.   spironolactone (ALDACTONE) 25 MG tablet   No No   Sig: Take 1 tablet (25 mg) by mouth daily.   traZODone (DESYREL) 50 MG tablet   Yes No   Sig: Take 25 mg by mouth at bedtime.   ursodiol (ACTIGALL) 300 MG capsule   No No   Sig: Take 1 capsule  "(300 mg) by mouth 2 times daily      Facility-Administered Medications: None        Review of Systems    The 10 point Review of Systems is negative other than noted in the HPI or here.     Social History   I have reviewed this patient's social history and updated it with pertinent information if needed.  Social History     Tobacco Use    Smoking status: Former     Current packs/day: 0.00     Types: Cigarettes     Quit date: 3/17/1993     Years since quittin.8     Passive exposure: Never    Smokeless tobacco: Never   Vaping Use    Vaping status: Never Used   Substance Use Topics    Alcohol use: Not Currently    Drug use: No         Family History   I have reviewed this patient's family history and updated it with pertinent information if needed.  Family History   Problem Relation Age of Onset    Family History Negative Mother          88 yo    Cancer Father          76 yo brain    Diabetes Maternal Grandfather          91 yo    Alcohol/Drug Paternal Grandfather             Colon Cancer No family hx of          Allergies   Allergies   Allergen Reactions    Ancef [Cefazolin] Rash    Cephalexin Itching and Rash     Allergic reaction required hospitalization 2024    Penicillins Anaphylaxis    Other Drug Allergy (See Comments) Unknown    Sulfa Antibiotics      \"itchy rash, swelling of face and hands\"        Physical Exam   Vital Signs: Temp: 97.9  F (36.6  C) Temp src: Oral BP: 112/60 Pulse: 89   Resp: 21   O2 Device: Nasal cannula Oxygen Delivery: 2 LPM  Weight: 0 lbs 0 oz      General Aox3, appropriate affect, NAD, on 2L of oxygen  obese  HEENT  dry MM, EOMI, PERRL  Chest Adeq E b/l, No wheezing  Heart RRR, No M/R/G  Abd- Soft, +RUQ tenderness, No RT or guarding  BS+  - Deferred,   Extremity- Moving all extremities, No digital clubbing,   No edema  Neuro- Aox3, grossly nonfocal,  gait not checked  Skin  Has no tattoo, No skin rash     Medical Decision Making       90 MINUTES " SPENT BY ME on the date of service doing chart review, history, exam, documentation & further activities per the note.      ------------------ MEDICAL DECISION MAKING ------------------------------------------------------------------------------------------------------      Data   ------------------------- PAST 24 HR DATA REVIEWED -----------------------------------------------    I have personally reviewed the following data over the past 24 hrs:    4.4  \   10.4 (L)   / 227     136 100 29.6 (H) /  148 (H)   3.8 21 (L) 1.18 (H) \     ALT: 75 (H) AST: 160 (H) AP: 356 (H) TBILI: 1.8 (H)   ALB: 3.4 (L) TOT PROTEIN: 6.8 LIPASE: N/A     Trop: 40 (H) BNP: 22,204 (H)     Procal: N/A CRP: N/A Lactic Acid: 3.8 (H)         Imaging results reviewed over the past 24 hrs:   Recent Results (from the past 24 hours)   XR Chest 2 Views    Narrative    EXAM: XR CHEST 2 VIEWS  LOCATION: [facility]  DATE: 1/22/2025    INDICATION: Shortness of breath.  COMPARISON: Chest radiograph 1/6/2025. CT chest 1/6/2025.      Impression    IMPRESSION:     Cardiomegaly with central pulmonary vascular congestion, mid and lower lung predominant mild interstitial edema, and trace bilateral pleural effusions, similar to 1/6/2025. No new airspace opacities. No pneumothorax.    Aortic atherosclerotic calcifications. Unchanged median sternotomy wires, spinal stimulator leads, mediastinal clips, and left humeral screws. An additional electronic device overlying the mediastinum on the lateral view does not have a correlate on the   frontal view and is likely within the overlying structures.   US Abdomen Limited (RUQ)    Narrative    EXAM: US ABDOMEN LIMITED  LOCATION: [facility]  DATE: 1/22/2025    INDICATION: fever. History of biliary stent.  COMPARISON: CT 1/6/2025  TECHNIQUE: Limited abdominal ultrasound.    FINDINGS:    GALLBLADDER: There is gallbladder sludge. No stones seen. There is diffuse  gallbladder wall thickening up to 4 mm. Trace pericholecystic fluid. No sonographic Garcia's.    BILE DUCTS: Extra and intrahepatic bile duct dilation. The common duct measures up to 15 mm.    LIVER: Enlarged measuring 21.3 cm MCL, craniocaudal. Coarsened echotexture, suggesting underlying fibrosis. Nodular contour. Diffuse hepatic fatty infiltration. No focal mass.    RIGHT KIDNEY: No hydronephrosis.    PANCREAS: Mostly obscured by gas.    No ascites.      Impression    IMPRESSION:  1.  Enlarged echogenic liver could represent steatohepatitis.  2.  Nodular surface contour consistent with fibrosis.  3.  Gallbladder wall thickening which could be due to acute or chronic cholecystitis, and cirrhosis.  4.  Bile duct dilation.

## 2025-01-23 NOTE — PHARMACY-ADMISSION MEDICATION HISTORY
Admission medication history completed at [facility]. Please see Pharmacist Admission Medication History note from 1/22/2025.    Margarita Hilton, PharmD on 1/23/2025 at 12:16 AM

## 2025-01-23 NOTE — H&P
GENERAL PRE-PROCEDURE:   Procedure:  ERCP - Ascending Cholangitis  Date/Time:  1/23/2025 1:33 PM    Verbal consent obtained?: Yes    Written consent obtained?: Yes    Risks and benefits: Risks, benefits and alternatives were discussed    Consent given by:  Patient  Patient states understanding of procedure being performed: Yes    Patient's understanding of procedure matches consent: Yes    Procedure consent matches procedure scheduled: Yes    Expected level of sedation:  Deep  Appropriately NPO:  Yes  ASA Class:  3  Mallampati  :  Grade 2- soft palate, base of uvula, tonsillar pillars, and portion of posterior pharyngeal wall visible  Lungs:  Lungs clear with good breath sounds bilaterally  Heart:  Normal heart sounds and rate  History & Physical reviewed:  History and physical reviewed and no updates needed  Statement of review:  I have reviewed the lab findings, diagnostic data, medications, and the plan for sedation

## 2025-01-23 NOTE — ANESTHESIA POSTPROCEDURE EVALUATION
Patient: Lance Ibrahim    Procedure: Procedure(s):  ENDOSCOPIC RETROGRADE CHOLANGIOPANCREATOGRAPHY, DILATED THE BILE STRICTURE, WASHINGS, STENT REMOVAL, STENT PLACEMENT, SENT WASHINGS TO CYTOLOGY       Anesthesia Type:  General    Note:  Disposition: Inpatient   Postop Pain Control: Uneventful            Sign Out: Well controlled pain   PONV: No   Neuro/Psych: Uneventful            Sign Out: Acceptable/Baseline neuro status   Airway/Respiratory: Uneventful            Sign Out: Acceptable/Baseline resp. status   CV/Hemodynamics: Uneventful            Sign Out: Acceptable CV status; No obvious hypovolemia; No obvious fluid overload   Other NRE: NONE   DID A NON-ROUTINE EVENT OCCUR? No           Last vitals:  Vitals Value Taken Time   /82 01/23/25 1531   Temp 36.6  C (97.88  F) 01/23/25 1535   Pulse 94 01/23/25 1535   Resp 13 01/23/25 1535   SpO2 96 % 01/23/25 1535   Vitals shown include unfiled device data.    Electronically Signed By: Larry Phillips MD  January 23, 2025  3:37 PM

## 2025-01-23 NOTE — PLAN OF CARE
Goal Outcome Evaluation:      Plan of Care Reviewed With: patient    Overall Patient Progress: improvingOverall Patient Progress: improving         IVF started. Has osman. Alert and oriented X4. Able to make needs known. Blood sugar 141 but will hold off on coverage, npo for ERCP.

## 2025-01-23 NOTE — ANESTHESIA CARE TRANSFER NOTE
Patient: Lnace Ibrahim    Procedure: Procedure(s):  ENDOSCOPIC RETROGRADE CHOLANGIOPANCREATOGRAPHY, DILATED THE BILE STRICTURE, WASHINGS, STENT REMOVAL, STENT PLACEMENT, SENT WASHINGS TO CYTOLOGY       Diagnosis: Cholangitis (H) [K83.09]  Diagnosis Additional Information: No value filed.    Anesthesia Type:   General     Note:    Oropharynx: oropharynx clear of all foreign objects and spontaneously breathing  Level of Consciousness: drowsy  Oxygen Supplementation: face mask    Independent Airway: airway patency satisfactory and stable  Dentition: dentition unchanged  Vital Signs Stable: post-procedure vital signs reviewed and stable  Report to RN Given: handoff report given  Patient transferred to: PACU    Handoff Report: Identifed the Patient, Identified the Reponsible Provider, Reviewed the pertinent medical history, Discussed the surgical course, Reviewed Intra-OP anesthesia mangement and issues during anesthesia, Set expectations for post-procedure period and Allowed opportunity for questions and acknowledgement of understanding      Vitals:  Vitals Value Taken Time   /74    Temp 36.5  C (97.7  F) 01/23/25 1529   Pulse 92 01/23/25 1529   Resp 10 01/23/25 1529   SpO2 98 % 01/23/25 1529   Vitals shown include unfiled device data.    Electronically Signed By: RANJAN Ewing CRNA  January 23, 2025  3:31 PM

## 2025-01-23 NOTE — PROGRESS NOTES
[facility]    Medicine Progress Note - Hospitalist Service    Date of Admission:  1/22/2025    Assessment & Plan   Lance Ibrahim, an 79-y. 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    #Acute cholangitis.  Patient has history of common bile duct stricture, s/p ERCP in April 2024.  Previous brushings were negative for malignancy.  #Sepsis due to cholangitis.  # Gram-negative bacteremia, 2/2 bottles, BC 1/22/2025.  He was given 1 g of ertapenem, 500 mg of metronidazole, and ceftriaxone.  Now on meropenem.  - Continue meropenem, follow-up on speciation and sensitivity  - GI consulted, tentative plans for ERCP today  - Holding aspirin for ERCP  - Gentle IVF while n.p.o., given severely depressed LV function  - Surveillance BC in a.m.  - Symptomatic treatment  -General surgery consulted, awaiting ERCP results  # LFTs in a.m.    #Acute respiratory failure with hypoxia.  Likely due to CHF exacerbation.  On 2 LPM.  #Systolic CHF with acute exacerbation.  BNP 22,000, historically running high, 15-19,000.  LVEF 20 to 25% with global LV hypokinesia, per echo in November 2024.  No significant volume overload on exam.  S/p IV Lasix.  - Telemetry  - Monitor intake and output, Barnes in place    #Essential hypertension.  - Continue PTA metoprolol at reduced dose, holding lisinopril.  - On initial metoprolol, spironolactone and Bumex were held.    #DM2.  PTA 18 units of Lantus, 2.5 mg of glipizide, metformin 500 mg twice daily.  -Holding metformin, glipizide while NPO.  -Lantus 18 units nightly for today, resume PTA dose tomorrow.  SSI NovoLog.    #History of  Cerebrovascular Accident (CVA):  - PT/OT evaluation for disposition needs.    #Urinary retention.  Barnes placed.  #Chronic Kidney Disease (CKD) Stage III:  Avoid nephrotoxic medications to prevent further renal impairment.  Monitor renal function.  - Voiding trial, when clinically improving    Gouty Arthritis: PTA Allopurinol and ursodiol on hold for now.    Insomnia/depression-hold PTA trazodone.    History of BRIANNA.  In the past did not tolerate CPAP.  He declined CPAP trial in the hospital.    Diet:  NPO  DVT Prophylaxis: Pneumatic Compression Devices  Barnes Catheter: PRESENT, indication:    Lines: None     Cardiac Monitoring: None  Code Status:  Full code    Diet: NPO per Anesthesia Guidelines for Procedure/Surgery Except for: Meds    DVT Prophylaxis: Pneumatic Compression Devices  Barnes Catheter: PRESENT, indication: Acute retention or obstruction  Lines: None     Cardiac Monitoring: ACTIVE order. Indication: Procedural area  Code Status: Full Code      Clinically Significant Risk Factors Present on Admission     # Hypoalbuminemia: Lowest albumin = 3 g/dL at 1/23/2025  4:45 AM, will monitor as appropriate   # Drug Induced Platelet Defect: home medication list includes an antiplatelet medication   # Hypertension: Noted on problem list  # Chronic heart failure with reduced ejection fraction: last echo with EF <40%  # Anemia: based on hgb <11  # DMII: A1C = 9.1 % (Ref range: <5.7 %) within past 6 months    # Overweight: Estimated body mass index is 25.06 kg/m  as calculated from the following:    Height as of this encounter: 1.829 m (6').    Weight as of this encounter: 83.8 kg (184 lb 11.9 oz).       # Financial/Environmental Concerns:     # History of CABG: noted on surgical history    Social Drivers of Health    Tobacco Use: Medium Risk (1/22/2025)    Patient History     Smoking Tobacco Use: Former     Smokeless Tobacco Use: Never     Passive Exposure: Never     Disposition Plan     Medically Ready for Discharge:  Anticipated in 2-4 Days    Kaye Yanez MD  Hospitalist Service  [facility]  Securely message with [employer] (more info)  Text page via [employer] Paging/Directory   ______________________________________________________________________    Interval History   Patient is new to me.  Chart reviewed.  Patient was seen and examined.  Complaining of epigastric and paramedical abdominal pain, without nausea, vomiting, fever, chills.  He has urinary frequency, prior to Barnes placement.  Denies associated dysuria or suprapubic or flank pain.  Patient denies dyspnea at rest, chest pain, cardiac operations.  No recent anginal episode.    Physical Exam   Vital Signs: Temp: 98.1  F (36.7  C) Temp src: Oral BP: 131/75 Pulse: 93   Resp: 20 SpO2: 99 % O2 Device: Nasal cannula Oxygen Delivery: 1.5 LPM  Weight: 184 lbs 11.93 oz  General: Alert and oriented x 3. Not in obvious distress.  HEENT: NC, AT. Neck- supple, No JVP elevation, lymphadenopathy or thyromegaly. Trachea-central.  Chest: Clear to auscultation bilaterally.  Heart: S1S2 regular. No M/R/G.  Abdomen: Soft. NT, ND. No organomegaly. Bowel sounds- active.  Back: No spine tenderness. No CVA tenderness.  Extremities: No leg swelling. Peripheral pulses 2+ bilaterally.  Neuro: Cranial nerves 1-12 grossly normal. No focal neurological deficit    Medical Decision Making     50 MINUTES SPENT BY ME on the date of service doing chart review, history, exam, documentation & further activities per the note.      Data     I have personally reviewed the following data over the past 24 hrs:    14.2 (H)  \   9.9 (L)   / 195     139 101 37.2 (H) /  137 (H)   4.1 23 1.46 (H) \     ALT: 74 (H) AST: 103 (H) AP: 299 (H) TBILI: 2.9 (H)   ALB: 3.0 (L) TOT PROTEIN: 6.4 LIPASE: N/A     Trop: 40 (H) BNP: 22,204 (H)     Procal: N/A CRP: N/A Lactic Acid: 1.8         Imaging results reviewed over the past 24 hrs:   Recent Results (from the past 24 hours)   XR Chest 2 Views     Narrative    EXAM: XR CHEST 2 VIEWS  LOCATION: [facility]  DATE: 1/22/2025  INDICATION: Shortness of breath.  COMPARISON: Chest radiograph 1/6/2025. CT chest 1/6/2025.      Impression    IMPRESSION:   Cardiomegaly with central pulmonary vascular congestion, mid and lower lung predominant mild interstitial edema, and trace bilateral pleural effusions, similar to 1/6/2025. No new airspace opacities. No pneumothorax.  Aortic atherosclerotic calcifications. Unchanged median sternotomy wires, spinal stimulator leads, mediastinal clips, and left humeral screws. An additional electronic device overlying the mediastinum on the lateral view does not have a correlate on the   frontal view and is likely within the overlying structures.   US Abdomen Limited (RUQ)    Narrative    EXAM: US ABDOMEN LIMITED  LOCATION: [facility]  DATE: 1/22/2025  INDICATION: fever. History of biliary stent.  COMPARISON: CT 1/6/2025  TECHNIQUE: Limited abdominal ultrasound.  FINDINGS:  GALLBLADDER: There is gallbladder sludge. No stones seen. There is diffuse gallbladder wall thickening up to 4 mm. Trace pericholecystic fluid. No sonographic Garcia's.  BILE DUCTS: Extra and intrahepatic bile duct dilation. The common duct measures up to 15 mm.  LIVER: Enlarged measuring 21.3 cm MCL, craniocaudal. Coarsened echotexture, suggesting underlying fibrosis. Nodular contour. Diffuse hepatic fatty infiltration. No focal mass.  RIGHT KIDNEY: No hydronephrosis.  PANCREAS: Mostly obscured by gas.  No ascites.      Impression    IMPRESSION:  1.  Enlarged echogenic liver could represent steatohepatitis.  2.  Nodular surface contour consistent with fibrosis.  3.  Gallbladder wall thickening which could be due to acute or chronic cholecystitis, and cirrhosis.  4.  Bile duct dilation.     This document is created with the help of Dragon dictation system. All grammatical errors are unintentional.

## 2025-01-23 NOTE — PROGRESS NOTES
In speaking with the patient, patient had mentioned that he does not wear cpap at home.  Pt didn't tolerate cpap at home and has declined use in the hospital.  Attending MD has been messaged to discontinue order or change to PRN.

## 2025-01-23 NOTE — ANESTHESIA PROCEDURE NOTES
Airway       Patient location during procedure: OR       Procedure Start/Stop Times: 1/23/2025 2:45 PM  Staff -        CRNA: Rizwan Ariza APRN CRNA       Performed By: CRNA  Consent for Airway        Urgency: elective  Indications and Patient Condition       Indications for airway management: shukri-procedural       Induction type:intravenous       Mask difficulty assessment: 1 - vent by mask    Final Airway Details       Final airway type: endotracheal airway       Successful airway: ETT - single  Endotracheal Airway Details        ETT size (mm): 7.0       Cuffed: yes       Successful intubation technique: direct laryngoscopy       DL Blade Type: MAC 4       Grade View of Cords: 1       Adjucts: stylet       Position: Right       Measured from: lips       Secured at (cm): 24       Bite block used: None    Post intubation assessment        Placement verified by: capnometry and equal breath sounds        Number of attempts at approach: 1       Number of other approaches attempted: 0       Secured with: tape       Ease of procedure: easy       Dentition: Intact and Unchanged    Medication(s) Administered   Medication Administration Time: 1/23/2025 2:45 PM

## 2025-01-23 NOTE — ANESTHESIA PREPROCEDURE EVALUATION
Anesthesia Pre-Procedure Evaluation    Patient: Lance Ibrahim   MRN: [id number] : 1945        Procedure : Procedure(s):  ENDOSCOPIC RETROGRADE CHOLANGIOPANCREATOGRAPHY          Past Medical History:   Diagnosis Date    Anemia     Arthritis     osteoarthritis knees    BPH     CAD (coronary artery disease)     subtotal occlusion in the small distal LAD     Cardiomyopathy     Cerebral artery occlusion with cerebral infarction     TIA , no residual    Cervicalgia     CHF (congestive heart failure) (H)     Chronic kidney disease     Chronic low back pain     Chronic rhinitis     Gouty arthropathy     hands    Hyperlipidemia     Hypertension     Kidney stone     Nocturia     Obesity     Osteomyelitis (H) 2023    Foot    PVD (peripheral vascular disease)     Sleep apnea     doesn't use cpap    Spinal cord stimulator status 2024    Spinal cord stimulator in place    TIA (transient ischaemic attack)     Type 2 diabetes mellitus - 2018    Ureteral stone       Past Surgical History:   Procedure Laterality Date    AMPUTATE FOOT Right 2023    Procedure: AMPUTATION, right hallux;  Surgeon: Nakul Salazar DPM;  Location: [facility] OR    AMPUTATE TOE(S) Left 10/15/2018    Procedure: AMPUTATE TOE(S);  Left third toe amputation;  Surgeon: Ayaka Azevedo DPM, Podiatry/Foot and Ankle Surgery;  Location:  OR    ARTHROPLASTY KNEE Right 2018    Procedure: Right total knee arthroplasty;  Surgeon: Issa Cunha MD;  Location:  OR    COLONOSCOPY  2013    Procedure: COLONOSCOPY;  COLONOSCOPY;  Surgeon: Chau Hogan MD;  Location: [facility]    CORONARY ARTERY BYPASS GRAFT, WITH ENDOSCOPIC VESSEL PROCUREMENT N/A 2024    Procedure: CORONARY ARTERY BYPASS GRAFT TIMES THREE, WITH LEFT INTERNAL MAMARARY ARTERY HARVEST, LEFT ENDOSCOPIC VESSEL PROCUREMENT, EPIAORTIC ULTRASOUND;  Surgeon: Tsering Evans MD;  Location: [facility] OR    CV CORONARY ANGIOGRAM N/A 2024     Procedure: Coronary Angiogram;  Surgeon: Talon Lew MD;  Location: [facility] CV    CV HEART CATHETERIZATION WITH POSSIBLE INTERVENTION Left 03/05/2019    Procedure: Coronary Angiogram;  Surgeon: Nas Linda MD;  Location:  HEART CARDIAC CATH LAB    CV INTRA AORTIC BALLOON N/A 11/16/2024    Procedure: Intra aortic Balloon Pump Insertion;  Surgeon: Jessica Yepez MD;  Location: [facility] LAB CV    CV INTRAVASULAR ULTRASOUND N/A 11/14/2024    Procedure: Intravascular Ultrasound;  Surgeon: Talon Lew MD;  Location: [facility] LAB CV    CV LEFT HEART CATH N/A 11/14/2024    Procedure: Left Heart Catheterization;  Surgeon: Talon Lew MD;  Location: [facility]    Diastasis recti repair  1985    ENDOSCOPIC RETROGRADE CHOLANGIOPANCREATOGRAM N/A 04/30/2024    Procedure: ENDOSCOPIC RETROGRADE CHOLANGIOPANCREATOGRAPHY, BILIARY SPHINCTEROTOMY, DILATION, BRUSHING, BIOPSIES with DISTAL EXTRA HEPATIC BILE DUCT STRICTURE;  Surgeon: Aldo Gongora MD;  Location: [facility] OR    ESOPHAGOSCOPY, GASTROSCOPY, DUODENOSCOPY (EGD), COMBINED N/A 04/30/2024    Procedure: ESOPHAGOGASTRODUODENOSCOPY, WITH ENDOSCOPIC ULTRASOUND GUIDANCE;  Surgeon: Aldo Gongora MD;  Location: [facility] OR    EXTRACAPSULAR CATARACT EXTRATION WITH INTRAOCULAR LENS IMPLANT Left 03/13/2017    EXTRACAPSULAR CATARACT EXTRATION WITH INTRAOCULAR LENS IMPLANT Right     FOOT SURGERY  04/2013    cyst removal     HERNIA REPAIR  07/13/2004    ventral     IR DISC ASPIRATION INJECTION  6/19/2024    IRRIGATION AND DEBRIDEMENT SHOULDER, COMBINED Right 8/7/2024    Procedure: IRRIGATION AND DEBRIDEMENT, SHOULDER;  Surgeon: Terence Hanson MD;  Location: [facility] OR    IRRIGATION AND DEBRIDEMENT UPPER EXTREMITY, COMBINED Right 8/7/2024    Procedure: IRRIGATION AND DEBRIDEMENT, ELBOW AND THUMB;  Surgeon: Terence Hanson MD;  Location: [facility] OR    ORIF Shoulder Left      "PROSTATE SURGERY  2024    Urolift    RESECT CLAVICLE DISTAL Right 2024    Procedure: EXCISION, CLAVICLE, DISTAL;  Surgeon: Terence Hanson MD;  Location: [facility] OR    SPINAL CORD STIMULATOR IMPLANT  2024    TRANSESOPHAGEAL ECHOCARDIOGRAM INTRAOPERATIVE N/A 2024    Procedure: ECHOCARDIOGRAM, TRANSESOPHAGEAL, INTRAOPERATIVE;  Surgeon: Tsering Evans MD;  Location: [facility] OR    Ventral hernia repair NOS  1987      Allergies   Allergen Reactions    Ancef [Cefazolin] Rash    Cephalexin Itching and Rash     Allergic reaction required hospitalization 2024    Penicillins Anaphylaxis    Other Drug Allergy (See Comments) Unknown    Sulfa Antibiotics      \"itchy rash, swelling of face and hands\"      Social History     Tobacco Use    Smoking status: Former     Current packs/day: 0.00     Types: Cigarettes     Quit date: 3/17/1993     Years since quittin.8     Passive exposure: Never    Smokeless tobacco: Never   Substance Use Topics    Alcohol use: Not Currently      Wt Readings from Last 1 Encounters:   25 83.8 kg (184 lb 11.9 oz)        Anesthesia Evaluation   Pt has had prior anesthetic.         ROS/MED HX  ENT/Pulmonary:     (+) sleep apnea,                                       Neurologic:     (+)          CVA,    TIA,                  Cardiovascular:     (+)  hypertension- -  CAD -  - -      CHF     ISBELL.                           METS/Exercise Tolerance:     Hematologic:       Musculoskeletal:       GI/Hepatic:       Renal/Genitourinary:     (+) renal disease,             Endo:     (+)  type II DM,             Obesity,       Psychiatric/Substance Use:       Infectious Disease:       Malignancy:       Other:            Physical Exam    Airway  airway exam normal      Mallampati: II   TM distance: > 3 FB   Neck ROM: full   Mouth opening: > 3 cm    Respiratory Devices and Support         Dental           Cardiovascular   cardiovascular exam normal      "     Pulmonary   pulmonary exam normal                OUTSIDE LABS:  CBC:   Lab Results   Component Value Date    WBC 14.2 (H) 01/23/2025    WBC 4.4 01/22/2025    HGB 9.9 (L) 01/23/2025    HGB 10.4 (L) 01/22/2025    HCT 31.5 (L) 01/23/2025    HCT 33.1 (L) 01/22/2025     01/23/2025     01/22/2025     BMP:   Lab Results   Component Value Date     01/23/2025     01/22/2025    POTASSIUM 4.1 01/23/2025    POTASSIUM 3.8 01/22/2025    CHLORIDE 101 01/23/2025    CHLORIDE 100 01/22/2025    CO2 23 01/23/2025    CO2 21 (L) 01/22/2025    BUN 37.2 (H) 01/23/2025    BUN 29.6 (H) 01/22/2025    CR 1.46 (H) 01/23/2025    CR 1.18 (H) 01/22/2025     (H) 01/23/2025     (H) 01/23/2025     COAGS:   Lab Results   Component Value Date    PTT 34 11/29/2024    INR 1.23 (H) 11/29/2024    FIBR 554 (H) 11/19/2024     POC:   Lab Results   Component Value Date    BGM 93 07/28/2020     HEPATIC:   Lab Results   Component Value Date    ALBUMIN 3.0 (L) 01/23/2025    PROTTOTAL 6.4 01/23/2025    ALT 74 (H) 01/23/2025     (H) 01/23/2025    ALKPHOS 299 (H) 01/23/2025    BILITOTAL 2.9 (H) 01/23/2025     OTHER:   Lab Results   Component Value Date    PH 7.39 11/20/2024    LACT 1.8 01/23/2025    A1C 9.1 (H) 11/14/2024    SHANNAN 9.0 01/23/2025    PHOS 2.3 (L) 11/23/2024    MAG 2.3 12/03/2024    LIPASE 40 11/14/2024    TSH 4.28 (H) 11/14/2024    T4 1.43 11/14/2024    CRP 4.7 (H) 08/25/2022     (H) 08/09/2024       Anesthesia Plan    ASA Status:  3       Anesthesia Type: MAC.   Induction: Intravenous, Propofol.   Maintenance: TIVA.        Consents    Anesthesia Plan(s) and associated risks, benefits, and realistic alternatives discussed. Questions answered and patient/representative(s) expressed understanding.     - Discussed:     - Discussed with:  Patient            Postoperative Care    Pain management: Multi-modal analgesia.   PONV prophylaxis: Ondansetron (or other 5HT-3)     Comments:               Min S.  MD Alan    I have reviewed the pertinent notes and labs in the chart from the past 30 days and (re)examined the patient.  Any updates or changes from those notes are reflected in this note.    Clinically Significant Risk Factors Present on Admission               # Hypoalbuminemia: Lowest albumin = 3 g/dL at 1/23/2025  4:45 AM, will monitor as appropriate   # Drug Induced Platelet Defect: home medication list includes an antiplatelet medication   # Hypertension: Noted on problem list  # Chronic heart failure with reduced ejection fraction: last echo with EF <40%     # Anemia: based on hgb <11      # DMII: A1C = 9.1 % (Ref range: <5.7 %) within past 6 months    # Overweight: Estimated body mass index is 25.06 kg/m  as calculated from the following:    Height as of this encounter: 1.829 m (6').    Weight as of this encounter: 83.8 kg (184 lb 11.9 oz).       # Financial/Environmental Concerns:     # History of CABG: noted on surgical history

## 2025-01-23 NOTE — PHARMACY-ADMISSION MEDICATION HISTORY
Admission medication history completed at [facility]. Please see Pharmacist Admission Medication History note from 01/22/2025.

## 2025-01-23 NOTE — PROGRESS NOTES
General Surgery Progress Note:    Hospital Day # 1    ASSESSMENT:     1. 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 Remains afebrile with HR 80-90s and on 2L oxygen, leukocytosis jump overnight 4.4<14.2, lactic normalized since admission 4>3.8>1.8. bilirubin continues to increase, patient remains amenable for GI ERCP today. Imaging demonstrates gallbladder wall thickening with sludge, dilated CBD. Will continue to follow along, no current plans for surgical intervention.    PLAN:   -NPO for GI procedure  -IV antibiotics  -No surgical intervention planned; Will follow along for now  -Medical management per primary team    SUBJECTIVE:   Lance Ibrahim was seen on rounds. States he is having a very busy year and his abdomen is not tender unless it is touched. Overnight denies feeling feverish, severe abdominal pain, or new symptoms. Is aware of GI procedure planned today and amenable.     Last year has had a UTI, surgical removal of gout crystals, MI and CABG, and now this. He says his 'parts are worn out.'    Patient Vitals for the past 24 hrs:   BP Temp Temp src Pulse Resp SpO2 Height Weight   01/23/25 0732 131/75 98.1  F (36.7  C) Oral 93 20 99 % -- --   01/23/25 0333 131/69 98.3  F (36.8  C) Oral 88 20 97 % -- 83.8 kg (184 lb 11.9 oz)   01/22/25 2300 128/68 97.7  F (36.5  C) Oral 93 24 99 % -- --   01/22/25 2226 112/60 97.9  F (36.6  C) Oral 89 21 -- 1.829 m (6') 90.4 kg (199 lb 4.7 oz)       Physical Exam:  General: patient seen resting in bed in no acute distress, alert and oriented to self, place, situation  Resp: no increased work of breathing, breathing comfortably on 2L oxygen nasal cannula  Abdomen: generally soft some tenderness in the upper right quadrant  Extremities: No edema, cyanosis, or obvious deformities visualized on  exam    Admission on 01/22/2025   Component Date Value    GLUCOSE BY METER POCT 01/22/2025 141 (H)     Sodium 01/23/2025 139     Potassium 01/23/2025 4.1     Carbon Dioxide (CO2) 01/23/2025 23     Anion Gap 01/23/2025 15     Urea Nitrogen 01/23/2025 37.2 (H)     Creatinine 01/23/2025 1.46 (H)     GFR Estimate 01/23/2025 48 (L)     Calcium 01/23/2025 9.0     Chloride 01/23/2025 101     Glucose 01/23/2025 137 (H)     Alkaline Phosphatase 01/23/2025 299 (H)     AST 01/23/2025 103 (H)     ALT 01/23/2025 74 (H)     Protein Total 01/23/2025 6.4     Albumin 01/23/2025 3.0 (L)     Bilirubin Total 01/23/2025 2.9 (H)     GLUCOSE BY METER POCT 01/23/2025 148 (H)     WBC Count 01/23/2025 14.2 (H)     RBC Count 01/23/2025 3.52 (L)     Hemoglobin 01/23/2025 9.9 (L)     Hematocrit 01/23/2025 31.5 (L)     MCV 01/23/2025 90     MCH 01/23/2025 28.1     MCHC 01/23/2025 31.4 (L)     RDW 01/23/2025 20.1 (H)     Platelet Count 01/23/2025 195     % Neutrophils 01/23/2025 87     % Lymphocytes 01/23/2025 4     % Monocytes 01/23/2025 6     % Eosinophils 01/23/2025 0     % Basophils 01/23/2025 0     % Immature Granulocytes 01/23/2025 2     NRBCs per 100 WBC 01/23/2025 0     Absolute Neutrophils 01/23/2025 12.3 (H)     Absolute Lymphocytes 01/23/2025 0.6 (L)     Absolute Monocytes 01/23/2025 0.9     Absolute Eosinophils 01/23/2025 0.0     Absolute Basophils 01/23/2025 0.0     Absolute Immature Granul* 01/23/2025 0.3     Absolute NRBCs 01/23/2025 0.0     Lactic Acid 01/23/2025 1.8     GLUCOSE BY METER POCT 01/23/2025 126 (H)    Admission on 01/22/2025, Discharged on 01/22/2025   Component Date Value    Influenza A PCR 01/22/2025 Negative     Influenza B PCR 01/22/2025 Negative     RSV PCR 01/22/2025 Negative     SARS CoV2 PCR 01/22/2025 Negative     Sodium 01/22/2025 136     Potassium 01/22/2025 3.8     Carbon Dioxide (CO2) 01/22/2025 21 (L)     Anion Gap 01/22/2025 15     Urea Nitrogen 01/22/2025 29.6 (H)     Creatinine 01/22/2025 1.18 (H)      GFR Estimate 01/22/2025 62     Calcium 01/22/2025 9.3     Chloride 01/22/2025 100     Glucose 01/22/2025 178 (H)     Alkaline Phosphatase 01/22/2025 356 (H)     AST 01/22/2025 160 (H)     ALT 01/22/2025 75 (H)     Protein Total 01/22/2025 6.8     Albumin 01/22/2025 3.4 (L)     Bilirubin Total 01/22/2025 1.8 (H)     Lactic Acid, Initial 01/22/2025 4.0 (HH)     pH Venous 01/22/2025 7.48 (H)     pCO2 Venous 01/22/2025 33 (L)     pO2 Venous 01/22/2025 34     Bicarbonate Venous 01/22/2025 25     Base Excess/Deficit Veno* 01/22/2025 1.5     FIO2 01/22/2025 21     Oxyhemoglobin Venous 01/22/2025 68 (L)     O2 Sat, Venous 01/22/2025 68.9 (L)     Culture 01/22/2025 Positive on the 1st day of incubation (A)     Culture 01/22/2025 Gram negative bacilli (AA)     Culture 01/22/2025 Positive on the 1st day of incubation (A)     Culture 01/22/2025 Gram negative bacilli (AA)     Systolic Blood Pressure 01/22/2025 128     Diastolic Blood Pressure 01/22/2025 59     Ventricular Rate 01/22/2025 105     Atrial Rate 01/22/2025 105     ND Interval 01/22/2025 196     QRS Duration 01/22/2025 108     QT 01/22/2025 354     QTc 01/22/2025 467     P Axis 01/22/2025 64     R AXIS 01/22/2025 -38     T Axis 01/22/2025 113     Interpretation ECG 01/22/2025                      Value:Sinus tachycardia  Left axis deviation  ST & T wave abnormality, consider lateral ischemia  Abnormal ECG  When compared with ECG of 17-Jan-2025 16:44,  Premature supraventricular complexes are no longer Present  ST no longer depressed in Inferior leads  ST elevation now present in Anterior leads  Confirmed by SEE ED PROVIDER NOTE FOR, ECG INTERPRETATION ([id number]),  MARIELLA SIGALA ([id number]) on 1/22/2025 12:15:04 PM      Troponin T, High Sensiti* 01/22/2025 40 (H)     Color Urine 01/22/2025 Yellow     Appearance Urine 01/22/2025 Clear     Glucose Urine 01/22/2025 Negative     Bilirubin Urine 01/22/2025 Negative     Ketones Urine 01/22/2025 Negative     Specific Gravity  Urine 01/22/2025 1.014     Blood Urine 01/22/2025 Negative     pH Urine 01/22/2025 7.0     Protein Albumin Urine 01/22/2025 100 (A)     Urobilinogen Urine 01/22/2025 <2.0     Nitrite Urine 01/22/2025 Negative     Leukocyte Esterase Urine 01/22/2025 Negative     Mucus Urine 01/22/2025 Present (A)     RBC Urine 01/22/2025 1     WBC Urine 01/22/2025 2     Squamous Epithelials Uri* 01/22/2025 <1     Hyaline Casts Urine 01/22/2025 4 (H)     WBC Count 01/22/2025 4.4     RBC Count 01/22/2025 3.74 (L)     Hemoglobin 01/22/2025 10.4 (L)     Hematocrit 01/22/2025 33.1 (L)     MCV 01/22/2025 89     MCH 01/22/2025 27.8     MCHC 01/22/2025 31.4 (L)     RDW 01/22/2025 19.6 (H)     Platelet Count 01/22/2025 227     % Neutrophils 01/22/2025 95     % Lymphocytes 01/22/2025 3     % Monocytes 01/22/2025 1     % Eosinophils 01/22/2025 0     % Basophils 01/22/2025 0     % Immature Granulocytes 01/22/2025 1     NRBCs per 100 WBC 01/22/2025 0     Absolute Neutrophils 01/22/2025 4.2     Absolute Lymphocytes 01/22/2025 0.1 (L)     Absolute Monocytes 01/22/2025 0.1     Absolute Eosinophils 01/22/2025 0.0     Absolute Basophils 01/22/2025 0.0     Absolute Immature Granul* 01/22/2025 0.0     Absolute NRBCs 01/22/2025 0.0     Lactic Acid 01/22/2025 3.8 (H)     Hold Specimen 01/22/2025 JIC     Troponin T, High Sensiti* 01/22/2025 40 (H)     N terminal Pro BNP Inpat* 01/22/2025 22,204 (H)     Acinetobacter species 01/22/2025 Not Detected     Citrobacter species 01/22/2025 Not Detected     Enterobacter species 01/22/2025 Not Detected     Proteus species 01/22/2025 Not Detected     Escherichia coli 01/22/2025 Not Detected     Klebsiella pneumoniae 01/22/2025 Not Detected     Klebsiella oxytoca 01/22/2025 Not Detected     Pseudomonas aeruginosa 01/22/2025 Not Detected     CTX-M 01/22/2025 NA     KPC 01/22/2025 NA     NDM 01/22/2025 NA     VIM 01/22/2025 NA     IMP 01/22/2025 NA     OXA 01/22/2025 NA         SVETALNA Kaur  [facility]  [facility]  [place] [address]  [facility] [phone]

## 2025-01-24 VITALS
DIASTOLIC BLOOD PRESSURE: 90 MMHG | TEMPERATURE: 98.3 F | OXYGEN SATURATION: 98 % | RESPIRATION RATE: 24 BRPM | BODY MASS INDEX: 25.02 KG/M2 | HEART RATE: 92 BPM | HEIGHT: 72 IN | WEIGHT: 184.75 LBS | SYSTOLIC BLOOD PRESSURE: 151 MMHG

## 2025-01-24 LAB
ALBUMIN SERPL BCG-MCNC: 2.9 G/DL (ref 3.5–5.2)
ALP SERPL-CCNC: 379 U/L (ref 40–150)
ALT SERPL W P-5'-P-CCNC: 75 U/L (ref 0–70)
ANION GAP SERPL CALCULATED.3IONS-SCNC: 12 MMOL/L (ref 7–15)
AST SERPL W P-5'-P-CCNC: 92 U/L (ref 0–45)
BILIRUB SERPL-MCNC: 2.2 MG/DL
BUN SERPL-MCNC: 44.1 MG/DL (ref 8–23)
CALCIUM SERPL-MCNC: 9.1 MG/DL (ref 8.8–10.4)
CHLORIDE SERPL-SCNC: 104 MMOL/L (ref 98–107)
CREAT SERPL-MCNC: 1.58 MG/DL (ref 0.67–1.17)
EGFRCR SERPLBLD CKD-EPI 2021: 44 ML/MIN/1.73M2
ERYTHROCYTE [DISTWIDTH] IN BLOOD BY AUTOMATED COUNT: 20.1 % (ref 10–15)
GLUCOSE BLDC GLUCOMTR-MCNC: 142 MG/DL (ref 70–99)
GLUCOSE BLDC GLUCOMTR-MCNC: 174 MG/DL (ref 70–99)
GLUCOSE BLDC GLUCOMTR-MCNC: 205 MG/DL (ref 70–99)
GLUCOSE BLDC GLUCOMTR-MCNC: 220 MG/DL (ref 70–99)
GLUCOSE SERPL-MCNC: 145 MG/DL (ref 70–99)
HCO3 SERPL-SCNC: 22 MMOL/L (ref 22–29)
HCT VFR BLD AUTO: 30.6 % (ref 40–53)
HGB BLD-MCNC: 9.5 G/DL (ref 13.3–17.7)
MCH RBC QN AUTO: 28.1 PG (ref 26.5–33)
MCHC RBC AUTO-ENTMCNC: 31 G/DL (ref 31.5–36.5)
MCV RBC AUTO: 91 FL (ref 78–100)
PLATELET # BLD AUTO: 215 10E3/UL (ref 150–450)
POTASSIUM SERPL-SCNC: 4.2 MMOL/L (ref 3.4–5.3)
PROT SERPL-MCNC: 6.3 G/DL (ref 6.4–8.3)
RBC # BLD AUTO: 3.38 10E6/UL (ref 4.4–5.9)
SODIUM SERPL-SCNC: 138 MMOL/L (ref 135–145)
WBC # BLD AUTO: 14.5 10E3/UL (ref 4–11)

## 2025-01-24 PROCEDURE — 84132 ASSAY OF SERUM POTASSIUM: CPT | Performed by: INTERNAL MEDICINE

## 2025-01-24 PROCEDURE — 99233 SBSQ HOSP IP/OBS HIGH 50: CPT | Mod: 24

## 2025-01-24 PROCEDURE — 87040 BLOOD CULTURE FOR BACTERIA: CPT | Performed by: INTERNAL MEDICINE

## 2025-01-24 PROCEDURE — 97165 OT EVAL LOW COMPLEX 30 MIN: CPT | Mod: GO

## 2025-01-24 PROCEDURE — 250N000011 HC RX IP 250 OP 636: Performed by: INTERNAL MEDICINE

## 2025-01-24 PROCEDURE — 250N000013 HC RX MED GY IP 250 OP 250 PS 637: Performed by: INTERNAL MEDICINE

## 2025-01-24 PROCEDURE — 84155 ASSAY OF PROTEIN SERUM: CPT | Performed by: INTERNAL MEDICINE

## 2025-01-24 PROCEDURE — 258N000003 HC RX IP 258 OP 636: Performed by: INTERNAL MEDICINE

## 2025-01-24 PROCEDURE — 97535 SELF CARE MNGMENT TRAINING: CPT | Mod: GO

## 2025-01-24 PROCEDURE — 99232 SBSQ HOSP IP/OBS MODERATE 35: CPT | Performed by: INTERNAL MEDICINE

## 2025-01-24 PROCEDURE — 82247 BILIRUBIN TOTAL: CPT | Performed by: INTERNAL MEDICINE

## 2025-01-24 PROCEDURE — 120N000004 HC R&B MS OVERFLOW

## 2025-01-24 PROCEDURE — 85018 HEMOGLOBIN: CPT | Performed by: INTERNAL MEDICINE

## 2025-01-24 PROCEDURE — 36415 COLL VENOUS BLD VENIPUNCTURE: CPT | Performed by: INTERNAL MEDICINE

## 2025-01-24 PROCEDURE — 85041 AUTOMATED RBC COUNT: CPT | Performed by: INTERNAL MEDICINE

## 2025-01-24 RX ORDER — FINASTERIDE 5 MG/1
5 TABLET, FILM COATED ORAL DAILY
Status: DISCONTINUED | OUTPATIENT
Start: 2025-01-24 | End: 2025-01-26 | Stop reason: HOSPADM

## 2025-01-24 RX ORDER — BUMETANIDE 0.5 MG/1
0.5 TABLET ORAL DAILY
Status: DISCONTINUED | OUTPATIENT
Start: 2025-01-24 | End: 2025-01-26

## 2025-01-24 RX ORDER — SPIRONOLACTONE 25 MG/1
25 TABLET ORAL DAILY
Status: DISCONTINUED | OUTPATIENT
Start: 2025-01-24 | End: 2025-01-26 | Stop reason: HOSPADM

## 2025-01-24 RX ORDER — FLUTICASONE PROPIONATE 50 MCG
2 SPRAY, SUSPENSION (ML) NASAL 2 TIMES DAILY
Status: DISCONTINUED | OUTPATIENT
Start: 2025-01-24 | End: 2025-01-26 | Stop reason: HOSPADM

## 2025-01-24 RX ORDER — PANTOPRAZOLE SODIUM 40 MG/1
40 TABLET, DELAYED RELEASE ORAL
Status: DISCONTINUED | OUTPATIENT
Start: 2025-01-24 | End: 2025-01-26 | Stop reason: HOSPADM

## 2025-01-24 RX ORDER — TAMSULOSIN HYDROCHLORIDE 0.4 MG/1
0.4 CAPSULE ORAL DAILY
Status: DISCONTINUED | OUTPATIENT
Start: 2025-01-24 | End: 2025-01-26 | Stop reason: HOSPADM

## 2025-01-24 RX ADMIN — METOPROLOL TARTRATE 12.5 MG: 25 TABLET, FILM COATED ORAL at 19:26

## 2025-01-24 RX ADMIN — METOPROLOL TARTRATE 12.5 MG: 25 TABLET, FILM COATED ORAL at 08:37

## 2025-01-24 RX ADMIN — PANTOPRAZOLE SODIUM 40 MG: 40 TABLET, DELAYED RELEASE ORAL at 12:41

## 2025-01-24 RX ADMIN — SPIRONOLACTONE 25 MG: 25 TABLET, FILM COATED ORAL at 17:16

## 2025-01-24 RX ADMIN — MEROPENEM 1 G: 1 INJECTION, POWDER, FOR SOLUTION INTRAVENOUS at 03:21

## 2025-01-24 RX ADMIN — TAMSULOSIN HYDROCHLORIDE 0.4 MG: 0.4 CAPSULE ORAL at 17:17

## 2025-01-24 RX ADMIN — BUMETANIDE 0.5 MG: 0.5 TABLET ORAL at 17:17

## 2025-01-24 RX ADMIN — FINASTERIDE 5 MG: 5 TABLET, FILM COATED ORAL at 17:17

## 2025-01-24 RX ADMIN — MEROPENEM 1 G: 1 INJECTION, POWDER, FOR SOLUTION INTRAVENOUS at 19:26

## 2025-01-24 RX ADMIN — INSULIN GLARGINE 18 UNITS: 100 INJECTION, SOLUTION SUBCUTANEOUS at 20:14

## 2025-01-24 RX ADMIN — MEROPENEM 1 G: 1 INJECTION, POWDER, FOR SOLUTION INTRAVENOUS at 12:41

## 2025-01-24 RX ADMIN — SODIUM CHLORIDE: 9 INJECTION, SOLUTION INTRAVENOUS at 00:58

## 2025-01-24 RX ADMIN — Medication 2 SPRAY: at 18:32

## 2025-01-24 RX ADMIN — Medication 2 SPRAY: at 08:38

## 2025-01-24 ASSESSMENT — ACTIVITIES OF DAILY LIVING (ADL)
ADLS_ACUITY_SCORE: 48
ADLS_ACUITY_SCORE: 47
ADLS_ACUITY_SCORE: 48
ADLS_ACUITY_SCORE: 47
ADLS_ACUITY_SCORE: 48
ADLS_ACUITY_SCORE: 47
ADLS_ACUITY_SCORE: 48
DEPENDENT_IADLS:: CLEANING;LAUNDRY;MEAL PREPARATION;MEDICATION MANAGEMENT;TRANSPORTATION
ADLS_ACUITY_SCORE: 48
ADLS_ACUITY_SCORE: 47
ADLS_ACUITY_SCORE: 47
ADLS_ACUITY_SCORE: 48
ADLS_ACUITY_SCORE: 47

## 2025-01-24 NOTE — PROGRESS NOTES
[facility]    Medicine Progress Note - Hospitalist Service    Date of Admission:  1/22/2025    Assessment & Plan     Lance Ibrahim, an 79-y. 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 Cholangiogram noted distal extrahepatic duct stricture s/p dilation; brushing sent for cytology.   - Gram-negative bacteremia, 2/2 bottles, BC 1/22/2025.   - Continue meropenem, follow-up on speciation and sensitivity  - Holding aspirin for x 72hrs  - await culture final results  - LFT a.m,.  - GI/Surgery assistance appreciated  - repeat ERCP in 10 to 12 weeks for stent exchange.      # Acute respiratory failure with hypoxia, resolved  # HFrEF w/o ADHF  # CABG x 3V (11/2024)  - BNP 22,000, historically running high, 15-19,000.    -  TTE (11/14/24): LVEF 20 to 25% with global LV hypokinesia, mod decrease RV systolic fxn, 1+ AR/TR/MR  - Resume spironolactone and Bumex  - Continue with BB  - Hold Lisinopril pending renal fxn in a.m.    # Essential hypertension.  - Metoprolol tartrate 12.5mg BID  - Spironolactone 25mg every day  - Bumex 0.5mg qd  -  hold lisinopril.     # DM2.  - Lantus 18U qd    - Novolog 1U:20g CHO AC TID  - sliding scale insulin/glucose checks  - Holdf glipizide & metformin   -Holding metformin, glipizide while NPO.     # History of Cerebrovascular Accident (CVA):  - restart ASA in 72hrs,  hold statin pending LFT's improving     # Urinary retention, s/p osman placed.  # DEAN on Chronic Kidney Disease (CKD) Stage III:  - Avoid nephrotoxic medications to prevent further renal impairment.  -labs a.m.  - ? Element of worsening DEAN due to CRS physiology  - Resume spironolactone/bumex  - consider osman removal and TOV on 1/25  - Restart Finasteride. Start Flomax.     # Gouty Arthritis  -  Resume Allopurinol and ursodiol on d/c     # Insomnia/depression   -hold PTA trazodone.  - melatonin     # History of BRIANNA    - In the past did not tolerate CPAP.    - He declined CPAP trial in the hospital.        Diet: Combination Diet Moderate Consistent Carb (60 g CHO per Meal) Diet; Low Saturated Fat Na <2400mg Diet    DVT Prophylaxis: Pneumatic Compression Devices  Osman Catheter: PRESENT, indication: Acute retention or obstruction  Lines: None     Cardiac Monitoring: None  Code Status: Full Code      Clinically Significant Risk Factors               # Hypoalbuminemia: Lowest albumin = 2.9 g/dL at 1/24/2025  4:37 AM, will monitor as appropriate     # Hypertension: Noted on problem list  # Chronic heart failure with reduced ejection fraction: last echo with EF <40%          # DMII: A1C = 9.1 % (Ref range: <5.7 %) within past 6 months, PRESENT ON ADMISSION  # Overweight: Estimated body mass index is 26.97 kg/m  as calculated from the following:    Height as of this encounter: 1.829 m (6').    Weight as of this encounter: 90.2 kg (198 lb 13.7 oz)., PRESENT ON ADMISSION     # Financial/Environmental Concerns:     # History of CABG: noted on surgical history       Social Drivers of Health    Tobacco Use: Medium Risk (1/23/2025)    Patient History     Smoking Tobacco Use: Former     Smokeless Tobacco Use: Never     Passive Exposure: Never          Disposition Plan     Medically Ready for Discharge: Anticipated in 2-4 Days             Lance Haynes DO, DO  Hospitalist Service  [facility]  Hospital  Securely message with [employer] (more info)  Text page via [employer] Paging/Directory   ______________________________________________________________________    Interval History     Afebrile. Events overnight noted.    Physical Exam   Vital Signs: Temp: 98  F (36.7  C) Temp src: Oral BP: (!) 147/90 Pulse: 86   Resp: 18 SpO2: 92 % O2 Device: None (Room air) Oxygen Delivery: 2 LPM  Weight: 198 lbs 13.68 oz    Gen nad  Cv rrr  Lungs decreased bases, non-labored  Abd BS+, nd  Neuro a&o    Lab /imaging reviewed

## 2025-01-24 NOTE — CONSULTS
Care Management Initial Consult    General Information  Assessment completed with: Patient,    Type of CM/SW Visit: Initial Assessment    Primary Care Provider verified and updated as needed: Yes   Readmission within the last 30 days: no previous admission in last 30 days      Reason for Consult: discharge planning  Advance Care Planning: Advance Care Planning Reviewed: present on chart          Communication Assessment  Patient's communication style: spoken language (English or Bilingual)    Hearing Difficulty or Deaf: no   Wear Glasses or Blind: yes    Cognitive  Cognitive/Neuro/Behavioral: WDL  Level of Consciousness: alert  Arousal Level: opens eyes spontaneously  Orientation: oriented x 4  Mood/Behavior: calm, cooperative  Best Language: 0 - No aphasia  Speech: spontaneous, clear    Living Environment:   People in home: alone, other (see comments) (lives in AL setting)     Current living Arrangements: assisted living  [facility]   Able to return to prior arrangements: yes       Family/Social Support:  Care provided by: self, homecare agency  Provides care for: no one  Marital Status:   Support system: Children, Facility resident(s)/Staff          Description of Support System: Supportive         Current Resources:   Patient receiving home care services: Yes  Skilled Home Care Services: Skilled Nursing, Home Health Aid     Community Resources: None  Equipment currently used at home: walker, standard, wheelchair, manual  Supplies currently used at home: Diabetic Supplies    Employment/Financial:  Employment Status: retired        Financial Concerns:             Does the patient's insurance plan have a 3 day qualifying hospital stay waiver?  Yes     Which insurance plan 3 day waiver is available? Alternative insurance waiver    Will the waiver be used for post-acute placement? Undetermined at this time    Lifestyle & Psychosocial Needs:  Social Drivers of Health     Food Insecurity: Low Risk   (1/22/2025)    Food Insecurity     Within the past 12 months, did you worry that your food would run out before you got money to buy more?: No     Within the past 12 months, did the food you bought just not last and you didn t have money to get more?: No   Depression: Not at risk (1/13/2025)    PHQ-2     PHQ-2 Score: 1   Housing Stability: Low Risk  (1/22/2025)    Housing Stability     Do you have housing? : Yes     Are you worried about losing your housing?: No   Tobacco Use: Medium Risk (1/23/2025)    Patient History     Smoking Tobacco Use: Former     Smokeless Tobacco Use: Never     Passive Exposure: Never   Financial Resource Strain: Low Risk  (1/22/2025)    Financial Resource Strain     Within the past 12 months, have you or your family members you live with been unable to get utilities (heat, electricity) when it was really needed?: No   Alcohol Use: Not on file   Transportation Needs: Low Risk  (1/22/2025)    Transportation Needs     Within the past 12 months, has lack of transportation kept you from medical appointments, getting your medicines, non-medical meetings or appointments, work, or from getting things that you need?: No   Physical Activity: Not on file   Interpersonal Safety: Low Risk  (1/22/2025)    Interpersonal Safety     Do you feel physically and emotionally safe where you currently live?: Yes     Within the past 12 months, have you been hit, slapped, kicked or otherwise physically hurt by someone?: No     Within the past 12 months, have you been humiliated or emotionally abused in other ways by your partner or ex-partner?: No   Stress: Not on file   Social Connections: Not on file   Health Literacy: Not on file       Functional Status:  Prior to admission patient needed assistance:   Dependent ADLs:: Bathing, Ambulation-walker  Dependent IADLs:: Cleaning, Laundry, Meal Preparation, Medication Management, Transportation       Mental Health Status:  Mental Health Status: No Current Concerns        Chemical Dependency Status:  Chemical Dependency Status: No Current Concerns             Values/Beliefs:  Spiritual, Cultural Beliefs, Amish Practices, Values that affect care:                 Discussed  Partnership in Safe Discharge Planning  document with patient/family: No    Additional Information:    Initial CM/SW assessment.  Met with patient in his room, he is alert and able to answer all assessment questions.  He lives at [facility] in [place]; reports has support with med management, cleaning, laundry, 3meals/day.  Uses walker and/or wheelchair for mobility. He reports he moved in April 2024, talks of multiple health set backs he has had since, has been in/out of hospital several times since this date.  Reports one TCU stay at [facility]; also reports currently open with [facility] for in-home PT.  Reports good support from his son Omari and staff at [facility].    Next Steps:     Discharge needs unknown at this time.  Reviewed possible home care or TCU; if TCU he is open to referral to [facility] again.  He reports if he is able to discharge home today his son Omari is able to provide a ride; if he discharge over the weekend he will need transportation scheduled; reviewed possible OOP cost for this, he expressed understanding.    Care Management remains involved for possible discharge needs and referrals.     PASTORA RhoadesW

## 2025-01-24 NOTE — PROGRESS NOTES
[facility] Progress Note     IMPRESSION:  79-year-old gentleman with  PMH CABG x 3 2024, HFpEF, DM 9.1% on 2024, CBD stricture demonstrated on ERCP 2024 with brushings negative for malignancy who was admitted on 25 for acute cholangitis likely due to blocked stent from previous ERCP. Repeat ERCP on 25 for stent exchange. Cholangiogram noted distal extrahepatic duct stricture s/p dilation; brushing sent for cytology. Currently on meropenem     : Pt is improving symptomatically but LFT's and white count remain elevated.     RECOMMENDATIONS:  - Continue monitoring LFTs    - Continue antibiotics    - Diet advance as tolerated    - Plan to repeat ERCP in 10 to 12 weeks for stent exchange.    - We will plan to chart check the patient over the weekend.  If LFTs improve, he likely can be discharged soon.     15 minutes of total time was spent providing patient care, including patient evaluation, reviewing documentation/test results, and     Charanjit Heck PA-C  [facility]  [phone]  ________________________________________________________________________      SUBJECTIVE:    Feeling better today. Less pains. No nausea. Tolerating diet without issues.      OBJECTIVE:  BP (!) 147/90 (BP Location: Left arm)   Pulse 86   Temp 98  F (36.7  C) (Oral)   Resp 18   Ht 1.829 m (6')   Wt 90.2 kg (198 lb 13.7 oz)   SpO2 92%   BMI 26.97 kg/m    Temp (24hrs), Av.1  F (36.7  C), Min:97.7  F (36.5  C), Max:98.7  F (37.1  C)    Patient Vitals for the past 72 hrs:   Weight   25 0332 90.2 kg (198 lb 13.7 oz)   25 0333 83.8 kg (184 lb 11.9 oz)   25 2226 90.4 kg (199 lb 4.7 oz)       Intake/Output Summary (Last 24 hours) at 2025 1211  Last data filed at 2025 0845  Gross per 24 hour   Intake 1801 ml   Output 800 ml   Net 1001 ml        PHYSICAL EXAM  GEN: Alert, oriented x3, communicative and in NAD.    LYMPH: No LAD noted.  HRT: RRR  LUNGS:  CTA  ABD:  ND, +BS, no guarding or pain to palpation, no rebound, no HSM.  SKIN: No rash, jaundice or spider angiomata      LABS:  I have reviewed the patient's new clinical lab results:     Recent Labs   Lab Test 01/24/25  0437 01/23/25 0445 01/22/25  1142 12/02/24  0944 11/29/24  0930 11/19/24  1416 11/19/24  0801 11/19/24  0511 11/18/24  1630   WBC 14.5* 14.2* 4.4   < > 16.8*   < >  --    < > 17.6*   HGB 9.5* 9.9* 10.4*   < > 10.9*   < >  --    < > 10.8*   MCV 91 90 89   < > 92   < >  --    < > 90    195 227   < > 395   < >  --    < > 262   INR  --   --   --   --  1.23*  --  1.62*  --  1.00    < > = values in this interval not displayed.     Recent Labs   Lab Test 01/24/25  0437 01/23/25 0445 01/22/25  1142   POTASSIUM 4.2 4.1 3.8   CHLORIDE 104 101 100   CO2 22 23 21*   BUN 44.1* 37.2* 29.6*   CR 1.58* 1.46* 1.18*   ANIONGAP 12 15 15     Recent Labs   Lab Test 01/24/25  0437 01/23/25  0445 01/22/25  1343 01/22/25  1142 01/17/25  1723 01/17/25  1721 01/06/25  0858 11/16/24  0406 11/14/24  1023 05/01/24  0703 04/30/24  2156 04/28/24  0523 04/27/24  0559   ALBUMIN 2.9* 3.0*  --  3.4*  --    < >  --    < > 3.0*   < > 3.0*   < > 3.3*   BILITOTAL 2.2* 2.9*  --  1.8*  --    < >  --    < > 1.6*   < > 6.5*   < > 7.1*   ALT 75* 74*  --  75*  --    < >  --    < > 33   < > 257*   < > 301*   AST 92* 103*  --  160*  --    < >  --    < > 38   < > 178*   < > 268*   PROTEIN  --   --  100*  --  50*  --  70*   < >  --    < >  --    < >  --    LIPASE  --   --   --   --   --   --   --   --  40  --  100*  --  163*    < > = values in this interval not displayed.         IMAGING:  reviewed

## 2025-01-24 NOTE — PLAN OF CARE
Problem: Heart Failure  Goal: Effective Oxygenation and Ventilation  Outcome: Progressing  Intervention: Optimize Oxygenation and Ventilation  Recent Flowsheet Documentation  Taken 1/23/2025 1620 by Amanda Tellez RN  Head of Bed (HOB) Positioning: HOB at 30-45 degrees     Problem: Infection  Goal: Absence of Infection Signs and Symptoms  Outcome: Progressing   Goal Outcome Evaluation:      Plan of Care Reviewed With: patient    Overall Patient Progress: improvingOverall Patient Progress: improving     Pt A/Ox4 w/ some forgetfullness, on RA, denies pain, assistx2 pivot to commode, wheelchair at baseline, SR w/ 1st degree AVB and BBB. ACHS BG checks. Barnes in place.     Pt arrived back to unit around 1600 from ERCP.     Amanda Tellez, RN

## 2025-01-24 NOTE — PROGRESS NOTES
General Surgery Progress Note:    Hospital Day # 2    ASSESSMENT:     1. 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 Remains afebrile with HR 80-90s leukocytosis 4.4<14.2<14.5 is s/p ERCP 1/23 some of this could be reactive from procedure. ERCP stent removal done and dilation of stricture, brushings taken not resulted yet. Imaging demonstrates gallbladder wall thickening with sludge, dilated CBD. Will continue to follow along, no current plans for surgical intervention.    PLAN:   -Diet as tolerated from our standpoint  -Continue antibiotics  -No current plans for surgical intervention  -Medical management per primary team    SUBJECTIVE:   Lance Ibrahim was seen on rounds. States he is doing well and eager to go home if he can because transportation and his ride are not available over the weekend. Feels okay today no abdominal pain no nausea or vomiting no fever or chills.    Patient Vitals for the past 24 hrs:   BP Temp Temp src Pulse Resp SpO2 Weight   01/24/25 0838 (!) 151/94 -- -- 89 -- -- --   01/24/25 0757 (!) 159/92 97.8  F (36.6  C) Oral 89 20 97 % --   01/24/25 0630 -- -- -- 92 -- (!) 91 % --   01/24/25 0332 -- -- -- -- -- -- 90.2 kg (198 lb 13.7 oz)   01/24/25 0320 (!) 142/85 98.7  F (37.1  C) Oral 95 20 98 % --   01/24/25 0005 -- -- -- 92 -- 98 % --   01/24/25 0001 -- -- -- 91 -- 92 % --   01/23/25 2326 (!) 151/90 98.3  F (36.8  C) Oral 95 24 92 % --   01/23/25 2007 (!) 145/83 -- -- 90 -- -- --   01/23/25 1936 (!) 142/83 98.2  F (36.8  C) Oral 92 20 97 % --   01/23/25 1700 (!) 167/94 -- -- 99 -- 92 % --   01/23/25 1630 (!) 159/93 -- -- 88 -- (!) 90 % --   01/23/25 1628 (!) 168/97 -- -- 88 -- 93 % --   01/23/25 1620 (!) 154/81 98  F (36.7  C) Oral 88 16 93 % --   01/23/25 1600 (!) 149/90 98.1  F (36.7  C) Core 84 12 96 % --    01/23/25 1545 (!) 141/85 98.1  F (36.7  C) Core 89 18 95 % --   01/23/25 1539 -- 98.1  F (36.7  C) -- 95 18 96 % --   01/23/25 1537 -- 98.1  F (36.7  C) -- 94 21 95 % --   01/23/25 1531 (!) 145/82 97.7  F (36.5  C) Core 92 12 100 % --   01/23/25 1337 132/80 -- -- 88 20 95 % --       Physical Exam:  General: patient seen sitting up in chair in no acute distress  Resp: no increased work of breathing, breathing comfortably on room air  Abdomen: generally soft some component of distention might be his normal. Generally nontender in all four quadrants no peritoneal signs or guarding on exam  Extremities: No edema, cyanosis, or obvious deformities visualized on exam    Admission on 01/22/2025   Component Date Value    GLUCOSE BY METER POCT 01/22/2025 141 (H)     Sodium 01/23/2025 139     Potassium 01/23/2025 4.1     Carbon Dioxide (CO2) 01/23/2025 23     Anion Gap 01/23/2025 15     Urea Nitrogen 01/23/2025 37.2 (H)     Creatinine 01/23/2025 1.46 (H)     GFR Estimate 01/23/2025 48 (L)     Calcium 01/23/2025 9.0     Chloride 01/23/2025 101     Glucose 01/23/2025 137 (H)     Alkaline Phosphatase 01/23/2025 299 (H)     AST 01/23/2025 103 (H)     ALT 01/23/2025 74 (H)     Protein Total 01/23/2025 6.4     Albumin 01/23/2025 3.0 (L)     Bilirubin Total 01/23/2025 2.9 (H)     GLUCOSE BY METER POCT 01/23/2025 148 (H)     WBC Count 01/23/2025 14.2 (H)     RBC Count 01/23/2025 3.52 (L)     Hemoglobin 01/23/2025 9.9 (L)     Hematocrit 01/23/2025 31.5 (L)     MCV 01/23/2025 90     MCH 01/23/2025 28.1     MCHC 01/23/2025 31.4 (L)     RDW 01/23/2025 20.1 (H)     Platelet Count 01/23/2025 195     % Neutrophils 01/23/2025 87     % Lymphocytes 01/23/2025 4     % Monocytes 01/23/2025 6     % Eosinophils 01/23/2025 0     % Basophils 01/23/2025 0     % Immature Granulocytes 01/23/2025 2     NRBCs per 100 WBC 01/23/2025 0     Absolute Neutrophils 01/23/2025 12.3 (H)     Absolute Lymphocytes 01/23/2025 0.6 (L)     Absolute Monocytes  01/23/2025 0.9     Absolute Eosinophils 01/23/2025 0.0     Absolute Basophils 01/23/2025 0.0     Absolute Immature Granul* 01/23/2025 0.3     Absolute NRBCs 01/23/2025 0.0     Lactic Acid 01/23/2025 1.8     GLUCOSE BY METER POCT 01/23/2025 126 (H)     GLUCOSE BY METER POCT 01/23/2025 123 (H)     Procalcitonin 01/23/2025 46.53 (HH)     PSA Tumor Marker 01/23/2025 0.13     GLUCOSE BY METER POCT 01/23/2025 120 (H)     ERCP 01/23/2025                      Value:[facility]  [address] [place] [address]  _______________________________________________________________________________  Patient Name: Lance Ibrahim              Procedure Date: 1/23/2025 2:36 PM  MRN: [id number]                       Account Number: [id number]  YOB: 1945              Admit Type: Inpatient  Age: 79                               Room: [place] [address]  Note Status: Finalized                Attending MD: TOO HOLT MD,   Instrument Name: ERCP Scope [id number]        _______________________________________________________________________________     Procedure:             ERCP  Indications:           Suspected ascending cholangitis  Providers:             TOO HOLT MD  Patient Profile:       This is an 79 year old male.  Referring MD:          NOELLE ROSE MD  Medicines:             General Anesthesia  Complications:         No immediate complications.  ____________________________________________________________________                          ___________  Procedure:             Pre-Anesthesia Assessment:                         - Prior to the procedure, a History and Physical was                          performed, and patient medications, allergies and                          sensitivities were reviewed. The patient's tolerance                          of previous anesthesia was reviewed.                         After obtaining informed consent, the scope was passed                           under direct vision. Throughout the procedure, the                          patient's blood pressure, pulse, and oxygen                          saturations were monitored continuously. The ERCP                          scope was introduced through the mouth, and used to                          inject contrast into and used to inject contrast into                          the bile duct. The ERCP was accomplished without                          difficulty. The patient tolerated the procedure well.                                                                                                             Findings:       The  film was normal. One stent was removed from the biliary tree        using a snare and sent for cytology. A wire was passed into the biliary        tree. A cholangiogram demonstrated a stricture in the distal        extra-hepatic duct. Dilation of the common bile duct stricture with an 8        mm balloon dilator was successful. Cells for cytology were obtained by        brushing in the lower third of the main bile duct. The duct was swept        and stones, debris, and pus was swept from the duct. One 10 Fr by 5 cm        temporary stent with a single external flap and a single internal flap        was placed 5 cm into the common bile duct. Bile flowed through the        stent. The stent was in good position. A metal stent could not be placed        secondary to the cystic duct coming off of the main duct just above the        bile duct stricture.                                                                                                             Moderate Sedation:       GA  Impression:            - One stent was removed from the biliary tree.                         - Common bile duct was successfully dilated.                         - Cells for cytology obtained in the lower third of                          the main duct.                         - One temporary stent was placed  into the common bile                          duct.  Recommendation:        - Discharge the patient to home.                         - No anticoagulation for 72 hours.                         - Clear liquid diet for 24 hours.                         - If pain free after 24 hours advance diet slowly as                          tolerated.                         - Repeat ERCP in 10-12 weeks for stent exchange.                         - Await pathology results.                                                                                     Too Gongora MD  ____________________  TOO GONGORA MD  1/23/2025 3:20:40 PM  I was physically                           present for the entire viewing portion of the exam.  __________________________  Signature of teaching physician  [id number] GONGORA MD  Number of Addenda: 0    Note Initiated On: 1/23/2025 2:36 PM  Scope In: 2:52:39 PM  Scope Out: 3:11:36 PM      GLUCOSE BY METER POCT 01/23/2025 113 (H)     GLUCOSE BY METER POCT 01/23/2025 184 (H)     Sodium 01/24/2025 138     Potassium 01/24/2025 4.2     Carbon Dioxide (CO2) 01/24/2025 22     Anion Gap 01/24/2025 12     Urea Nitrogen 01/24/2025 44.1 (H)     Creatinine 01/24/2025 1.58 (H)     GFR Estimate 01/24/2025 44 (L)     Calcium 01/24/2025 9.1     Chloride 01/24/2025 104     Glucose 01/24/2025 145 (H)     Alkaline Phosphatase 01/24/2025 379 (H)     AST 01/24/2025 92 (H)     ALT 01/24/2025 75 (H)     Protein Total 01/24/2025 6.3 (L)     Albumin 01/24/2025 2.9 (L)     Bilirubin Total 01/24/2025 2.2 (H)     WBC Count 01/24/2025 14.5 (H)     RBC Count 01/24/2025 3.38 (L)     Hemoglobin 01/24/2025 9.5 (L)     Hematocrit 01/24/2025 30.6 (L)     MCV 01/24/2025 91     MCH 01/24/2025 28.1     MCHC 01/24/2025 31.0 (L)     RDW 01/24/2025 20.1 (H)     Platelet Count 01/24/2025 215     GLUCOSE BY METER POCT 01/24/2025 142 (H)         Alma Castro PA-C  [address]   [address]  [place] [address]  [facility] [phone]

## 2025-01-24 NOTE — PLAN OF CARE
Problem: Skin Injury Risk Increased  Goal: Skin Health and Integrity  Outcome: Progressing     Problem: Infection  Goal: Absence of Infection Signs and Symptoms  Outcome: Progressing     Problem: Comorbidity Management  Goal: Blood Glucose Levels Within Targeted Range  Outcome: Progressing     Problem: Comorbidity Management  Goal: Blood Pressure in Desired Range  Outcome: Progressing   Goal Outcome Evaluation:    Pt denies pain. Alert and oriented x4. Placed on 2L oxygen through nasal canula when sleeping. On room air when awake. Continues with iv antibiotics. Barnes patent. NSR on cardiac monitor.

## 2025-01-24 NOTE — PROGRESS NOTES
"   01/24/25 1404   Appointment Info   Signing Clinician's Name / Credentials (OT) Estee Zac, OTR/L   Living Environment   People in Home facility resident   Current Living Arrangements assisted living   Self-Care   Equipment Currently Used at Home wheelchair, manual;walker, rolling  (4WW)   Fall history within last six months yes   Number of times patient has fallen within last six months 2  (2 per pt, 5 per chart)   Activity/Exercise/Self-Care Comment Independent with ADLs, mostly uses W/C. Transfers independently. Uses 4WW with home care PT.   Instrumental Activities of Daily Living (IADL)   IADL Comments Facility staff helps with IADLs, wheels him down to meals in W/C.   General Information   Onset of Illness/Injury or Date of Surgery 01/22/25   Referring Physician Lance Haynes, DO   Patient/Family Therapy Goal Statement (OT) to go back to assisted living   Additional Occupational Profile Info/Pertinent History of Current Problem Per chart review, pt \"is an 79-y.O M with PMH of CAD, s/p CABG x3 in November 2024, systolic CHF with ejection fraction 20 to 25% in November 2024, IDDM, common bile duct stricture status post ERCP in April 2024, chronic back and foot pain, benign prostatic hyperplasia, hypertension, hyperlipidemia, obesity, and a prior transient ischemic attack, and other medical comorbidities who is being admitted with acute cholangitis.\"   Existing Precautions/Restrictions fall   Cognitive Status Examination   Orientation Status orientation to person, place and time  (A&Ox4)   Pain Assessment   Patient Currently in Pain Yes, see Vital Sign flowsheet  (L knee pain)   Range of Motion Comprehensive   General Range of Motion no range of motion deficits identified   Strength Comprehensive (MMT)   Comment, General Manual Muscle Testing (MMT) Assessment Slight generalized weakness noted functionally.   Bed Mobility   Bed Mobility supine-sit   Supine-Sit [place] (Bed Mobility) supervision "   Assistive Device (Bed Mobility) bed rails   Transfers   Transfers bed-chair transfer;toilet transfer   Transfer Skill: Bed to Chair/Chair to Bed   Bed-Chair [place] (Transfers) contact guard   Transfer Comments pivot   Toilet Transfer   [place] Level (Toilet Transfer) not tested   Toilet Transfer Comments Per clinical reasoning, anticipate pt could perform transfer W/C<>toilet with CGA.   Balance   Balance Comments CGA standing balance, instability noted with no LOB   Activities of Daily Living   BADL Assessment/Intervention lower body dressing;toileting   Lower Body Dressing Assessment/Training   Position (Lower Body Dressing) edge of bed sitting   Comment, (Lower Body Dressing) donning slip on shoes   [place] Level (Lower Body Dressing) minimum assist (75% patient effort);set up   Toileting   Comment, (Toileting) Per clinical reasoning, anticipate pt may have difficulty performing toileting tasks in standing d/t generalized weakness and impaired balance.   [place] Level (Toileting) not tested   Clinical Impression   Criteria for Skilled Therapeutic Interventions Met (OT) Yes, treatment indicated   OT Diagnosis Impaired ability to perform ADLs, IADLs, and functional mobility.   Influenced by the following impairments cholangitis   OT Problem List-Impairments impacting ADL problems related to;activity tolerance impaired;balance;strength   Assessment of Occupational Performance 1-3 Performance Deficits   Identified Performance Deficits toileting, lower body dressing   Planned Therapy Interventions (OT) ADL retraining;balance training;strengthening;transfer training;home program guidelines;risk factor education   Clinical Decision Making Complexity (OT) problem focused assessment/low complexity   Risk & Benefits of therapy have been explained evaluation/treatment results reviewed;care plan/treatment goals reviewed;risks/benefits reviewed;current/potential barriers reviewed;participants voiced  agreement with care plan;participants included;patient   OT Total Evaluation Time   OT Eval, Low Complexity Minutes (69883) 10   OT Goals   Therapy Frequency (OT) 5 times/week   OT Predicted Duration/Target Date for Goal Attainment 01/31/25   OT Goals Lower Body Dressing;Toilet Transfer/Toileting   OT: Lower Body Dressing Modified independent;using adaptive equipment   OT: Toilet Transfer/Toileting Modified independent;toilet transfer;cleaning and garment management;using adaptive equipment  (pivot transfer W/C<>toilet)   Self-Care/Home Management   Self-Care/Home Mgmt/ADL, Compensatory, Meal Prep Minutes (00489) 12   Symptoms Noted During/After Treatment (Meal Preparation/Planning Training) fatigue;increased pain   Treatment Detail/Skilled Intervention Practiced additional pivot transfers to promote independence and consistency. Pt completed additional pivot transfers recliner<>EOB with CGA, fatigued easily. Pt required seated rest breaks between d/t fatigue and pain. Pt left in recliner at end of session with chair alarm on and call light in reach.   OT Discharge Planning   OT Plan bring W/C and practice toilet pivot transfer and toileting tasks, lower body dressing - then likely can discharge   OT Discharge Recommendation (DC Rec) home with assist;home with home care occupational therapy   OT Rationale for DC Rec Pt able to perform transfers with CGA, anticipate pt will be safe to discharge back to [facility]. May benefit from  OT services.   OT Brief overview of current status CGA pivot transfers, Min A lower body dressing   OT Total Distance Amb During Session (feet) 0   Total Session Time   Timed Code Treatment Minutes 12   Total Session Time (sum of timed and untimed services) 22

## 2025-01-24 NOTE — PLAN OF CARE
Problem: Heart Failure  Goal: Stable Heart Rate and Rhythm  Outcome: Progressing     Problem: Heart Failure  Goal: Optimal Cardiac Output  Outcome: Progressing     Problem: Infection  Goal: Absence of Infection Signs and Symptoms  Outcome: Progressing   Goal Outcome Evaluation:    Vital signs stable. Denied pain. Tolerated diet without nausea/vomiting or abdominal pain. IV fluids discontinued. Tele discontinued. Worked with therapy this afternoon.    Blood cultures from 1/22 growing klebsiella oxytoca in addition to klebsiella pneuoniae - hospitalist aware.     Discharge barriers - IV antibiotics, monitor LFTs.

## 2025-01-25 LAB
ALBUMIN SERPL BCG-MCNC: 2.7 G/DL (ref 3.5–5.2)
ALP SERPL-CCNC: 514 U/L (ref 40–150)
ALT SERPL W P-5'-P-CCNC: 69 U/L (ref 0–70)
ANION GAP SERPL CALCULATED.3IONS-SCNC: 11 MMOL/L (ref 7–15)
AST SERPL W P-5'-P-CCNC: 62 U/L (ref 0–45)
BILIRUB SERPL-MCNC: 1.5 MG/DL
BUN SERPL-MCNC: 41.4 MG/DL (ref 8–23)
CALCIUM SERPL-MCNC: 8.9 MG/DL (ref 8.8–10.4)
CHLORIDE SERPL-SCNC: 100 MMOL/L (ref 98–107)
CREAT SERPL-MCNC: 1.31 MG/DL (ref 0.67–1.17)
EGFRCR SERPLBLD CKD-EPI 2021: 55 ML/MIN/1.73M2
ERYTHROCYTE [DISTWIDTH] IN BLOOD BY AUTOMATED COUNT: 19.4 % (ref 10–15)
GLUCOSE BLDC GLUCOMTR-MCNC: 143 MG/DL (ref 70–99)
GLUCOSE BLDC GLUCOMTR-MCNC: 171 MG/DL (ref 70–99)
GLUCOSE BLDC GLUCOMTR-MCNC: 186 MG/DL (ref 70–99)
GLUCOSE BLDC GLUCOMTR-MCNC: 219 MG/DL (ref 70–99)
GLUCOSE SERPL-MCNC: 143 MG/DL (ref 70–99)
HCO3 SERPL-SCNC: 24 MMOL/L (ref 22–29)
HCT VFR BLD AUTO: 29.4 % (ref 40–53)
HGB BLD-MCNC: 9.4 G/DL (ref 13.3–17.7)
MAGNESIUM SERPL-MCNC: 1.9 MG/DL (ref 1.7–2.3)
MCH RBC QN AUTO: 28.1 PG (ref 26.5–33)
MCHC RBC AUTO-ENTMCNC: 32 G/DL (ref 31.5–36.5)
MCV RBC AUTO: 88 FL (ref 78–100)
PLATELET # BLD AUTO: 187 10E3/UL (ref 150–450)
POTASSIUM SERPL-SCNC: 3.9 MMOL/L (ref 3.4–5.3)
PROT SERPL-MCNC: 6.1 G/DL (ref 6.4–8.3)
RBC # BLD AUTO: 3.35 10E6/UL (ref 4.4–5.9)
SODIUM SERPL-SCNC: 135 MMOL/L (ref 135–145)
WBC # BLD AUTO: 9.2 10E3/UL (ref 4–11)

## 2025-01-25 PROCEDURE — 99232 SBSQ HOSP IP/OBS MODERATE 35: CPT | Performed by: INTERNAL MEDICINE

## 2025-01-25 PROCEDURE — 120N000004 HC R&B MS OVERFLOW

## 2025-01-25 PROCEDURE — 83735 ASSAY OF MAGNESIUM: CPT | Performed by: INTERNAL MEDICINE

## 2025-01-25 PROCEDURE — 250N000013 HC RX MED GY IP 250 OP 250 PS 637: Performed by: INTERNAL MEDICINE

## 2025-01-25 PROCEDURE — 250N000011 HC RX IP 250 OP 636: Performed by: INTERNAL MEDICINE

## 2025-01-25 PROCEDURE — 82040 ASSAY OF SERUM ALBUMIN: CPT | Performed by: INTERNAL MEDICINE

## 2025-01-25 PROCEDURE — 36415 COLL VENOUS BLD VENIPUNCTURE: CPT | Performed by: INTERNAL MEDICINE

## 2025-01-25 PROCEDURE — 97161 PT EVAL LOW COMPLEX 20 MIN: CPT | Mod: GP

## 2025-01-25 PROCEDURE — 85027 COMPLETE CBC AUTOMATED: CPT | Performed by: INTERNAL MEDICINE

## 2025-01-25 PROCEDURE — 80053 COMPREHEN METABOLIC PANEL: CPT | Performed by: INTERNAL MEDICINE

## 2025-01-25 RX ORDER — METOPROLOL SUCCINATE 50 MG/1
50 TABLET, EXTENDED RELEASE ORAL ONCE
Status: COMPLETED | OUTPATIENT
Start: 2025-01-25 | End: 2025-01-25

## 2025-01-25 RX ADMIN — SPIRONOLACTONE 25 MG: 25 TABLET, FILM COATED ORAL at 08:22

## 2025-01-25 RX ADMIN — MEROPENEM 1 G: 1 INJECTION, POWDER, FOR SOLUTION INTRAVENOUS at 20:21

## 2025-01-25 RX ADMIN — METOPROLOL TARTRATE 12.5 MG: 25 TABLET, FILM COATED ORAL at 08:21

## 2025-01-25 RX ADMIN — INSULIN GLARGINE 18 UNITS: 100 INJECTION, SOLUTION SUBCUTANEOUS at 21:45

## 2025-01-25 RX ADMIN — TAMSULOSIN HYDROCHLORIDE 0.4 MG: 0.4 CAPSULE ORAL at 08:21

## 2025-01-25 RX ADMIN — MEROPENEM 1 G: 1 INJECTION, POWDER, FOR SOLUTION INTRAVENOUS at 12:11

## 2025-01-25 RX ADMIN — Medication 2 SPRAY: at 20:21

## 2025-01-25 RX ADMIN — Medication 2 SPRAY: at 08:22

## 2025-01-25 RX ADMIN — METOPROLOL SUCCINATE 50 MG: 50 TABLET, EXTENDED RELEASE ORAL at 16:52

## 2025-01-25 RX ADMIN — PANTOPRAZOLE SODIUM 40 MG: 40 TABLET, DELAYED RELEASE ORAL at 07:01

## 2025-01-25 RX ADMIN — FINASTERIDE 5 MG: 5 TABLET, FILM COATED ORAL at 08:21

## 2025-01-25 RX ADMIN — MEROPENEM 1 G: 1 INJECTION, POWDER, FOR SOLUTION INTRAVENOUS at 04:52

## 2025-01-25 RX ADMIN — BUMETANIDE 0.5 MG: 0.5 TABLET ORAL at 08:21

## 2025-01-25 ASSESSMENT — ACTIVITIES OF DAILY LIVING (ADL)
ADLS_ACUITY_SCORE: 49
ADLS_ACUITY_SCORE: 50
ADLS_ACUITY_SCORE: 49
ADLS_ACUITY_SCORE: 50
ADLS_ACUITY_SCORE: 49
ADLS_ACUITY_SCORE: 50
ADLS_ACUITY_SCORE: 49
ADLS_ACUITY_SCORE: 50

## 2025-01-25 NOTE — PLAN OF CARE
Problem: Adult Inpatient Plan of Care  Goal: Absence of Hospital-Acquired Illness or Injury  Intervention: Identify and Manage Fall Risk  Recent Flowsheet Documentation  Taken 1/24/2025 1600 by Eleanor Heck RN  Safety Promotion/Fall Prevention:   activity supervised   assistive device/personal items within reach   clutter free environment maintained   nonskid shoes/slippers when out of bed   patient and family education   room near nurse's station   room organization consistent   safety round/check completed   supervised activity   toileting scheduled   lighting adjusted     Problem: Adult Inpatient Plan of Care  Goal: Optimal Comfort and Wellbeing  Outcome: Progressing   Goal Outcome Evaluation:    A  & O x 4, VSS, on 1 L NC. Denied pain, able to make needs known. Pleasant and cooperative.

## 2025-01-25 NOTE — PLAN OF CARE
"Goal Outcome Evaluation:                    Patient alert and oriented.  Denied pain.  Patient had shower at midnight with assistance of 1 staff member.  Patient reported feeling much better afterwards. Patient reported that he didn't wear supplemental oxygen at home, so attempted to monitor while he was sleeping on room air.  While sleeping desated down to 80s, so nasal canula placed to maintain O2 at goal range of 92%.  Barnes intact and draining. Will continue to monitor.        Problem: Adult Inpatient Plan of Care  Goal: Plan of Care Review  Description: The Plan of Care Review/Shift note should be completed every shift.  The Outcome Evaluation is a brief statement about your assessment that the patient is improving, declining, or no change.  This information will be displayed automatically on your shift  note.  Outcome: Progressing  Goal: Patient-Specific Goal (Individualized)  Description: You can add care plan individualizations to a care plan. Examples of Individualization might be:  \"Parent requests to be called daily at 9am for status\", \"I have a hard time hearing out of my right ear\", or \"Do not touch me to wake me up as it startles  me\".  Outcome: Progressing  Goal: Absence of Hospital-Acquired Illness or Injury  Outcome: Progressing  Intervention: Identify and Manage Fall Risk  Recent Flowsheet Documentation  Taken 1/25/2025 0000 by Chelsi Haq RN  Safety Promotion/Fall Prevention:   activity supervised   safety round/check completed   nonskid shoes/slippers when out of bed   lighting adjusted   increase visualization of patient   increased rounding and observation   clutter free environment maintained   supervised activity  Intervention: Prevent Skin Injury  Recent Flowsheet Documentation  Taken 1/25/2025 0057 by Chelsi Haq RN  Body Position: position changed independently  Intervention: Prevent Infection  Recent Flowsheet Documentation  Taken 1/25/2025 0000 by Chelsi Haq, " RN  Infection Prevention:   hand hygiene promoted   rest/sleep promoted  Goal: Optimal Comfort and Wellbeing  Outcome: Progressing  Goal: Readiness for Transition of Care  Outcome: Progressing

## 2025-01-25 NOTE — PROGRESS NOTES
GASTROENTEROLOGY PROGRESS NOTE     SUBJECTIVE   No major events overnight.  Patient remains afebrile.  Denies nausea, vomiting or abdominal pain.  LFTs continue to improve.  Blood cultures growing Klebsiella was sensitivities pending     OBJECTIVE     Vitals Blood pressure (!) 158/89, pulse 80, temperature 98.3  F (36.8  C), temperature source Oral, resp. rate 18, height 1.829 m (6'), weight 87.8 kg (193 lb 9.6 oz), SpO2 97%.          Physical Exam  General: awake, alert, oriented times three   Abdomen: soft, non-tender, non-distended        LABORATORY    ELECTROLYTE PANEL   Recent Labs   Lab 01/25/25  0805 01/25/25  0755 01/24/25 2005 01/24/25  0712 01/24/25 0437 01/23/25  0839 01/23/25 0445   NA  --  135  --   --  138  --  139   POTASSIUM  --  3.9  --   --  4.2  --  4.1   CHLORIDE  --  100  --   --  104  --  101   CO2  --  24  --   --  22  --  23   * 143* 220*   < > 145*   < > 137*   CR  --  1.31*  --   --  1.58*  --  1.46*   BUN  --  41.4*  --   --  44.1*  --  37.2*    < > = values in this interval not displayed.      HEMATOLOGY PANEL   Recent Labs   Lab 01/25/25  0755 01/24/25 0437 01/23/25 0445   HGB 9.4* 9.5* 9.9*   MCV 88 91 90   WBC 9.2 14.5* 14.2*    215 195      LIVER AND PANCREAS PANEL   Recent Labs   Lab 01/25/25  0755 01/24/25  0437 01/23/25  0445   AST 62* 92* 103*   ALT 69 75* 74*   ALKPHOS 514* 379* 299*   BILITOTAL 1.5* 2.2* 2.9*     IMAGING STUDIES        I have reviewed the current diagnostic and laboratory tests.              IMPRESSION   Lance Ibrahim is a pleasant 79 year old male with a past ministry of CABG, HFpEF, diabetes, previous CBD stricture status post ERCP 4/2024 who presented with acute cholangitis now status post ERCP on 1/23/2025.  ERCP again demonstrated a long CBD stricture of unclear etiology though suspicious for possible malignancy.  Brushings were obtained and pending.  He is now status post stenting with improvement in LFTs.  Blood cultures have returned  with 2 species of Klebsiella with sensitivities pending.  Overall, patient is improved clinically.We will arrange follow-up appreciate input from hospitalist medicine for management of his bacteremia due to cholangitis.    Patient will need repeat ERCP in 10 to 12 weeks.  Treatment that time will depend on findings of brushings.       RECOMMENDATION   Continue trend LFTs while inpatient.  Would repeat LFTs 1 week after discharge with PCP   2.   Continue treatment of Klebsiella bacteremia per hospitalist medicine   3.   Await cytology brushings.   4.  Repeat ERCP in 10 to 12 weeks for stent exchange and other interventions based on cytology results.  [facility] will arrange this        We will sign off at this time.  Please call with any additional questions.          Jeffery Sellers MD  Thank you for the opportunity to participate in the care of this patient.   Please feel free to call me with any questions or concerns.  Phone number [phone].

## 2025-01-25 NOTE — PROGRESS NOTES
[facility] [place]    Medicine Progress Note - Hospitalist Service    Date of Admission:  1/22/2025    Assessment & Plan   Lance Ibrahim, an 79-y. 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 Cholangiogram noted distal extrahepatic duct stricture s/p dilation; brushing sent for cytology.   - Gram-negative bacteremia, 2/2 bottles, BC 1/22/2025.  Repeat BC so far no growth.  Currently on meropenem, continue  - Holding aspirin for x 72hrs  - GI/Surgery assistance appreciated  - Repeat ERCP in 10 to 12 weeks for stent exchange.   --LFT trending down     # Acute respiratory failure with hypoxia, resolved  # HFrEF w/o ADHF  # CABG x 3V (11/2024)  - BNP 22,000, historically running high, 15-19,000.    -  TTE (11/14/24): LVEF 20 to 25% with global LV hypokinesia, mod decrease RV systolic fxn, 1+ AR/TR/MR  - Resumed spironolactone and Bumex  - Continue with BB    # Essential hypertension.  - PTA Toprol-XL, Aldactone, Bumex.  Likely resume lisinopril tomorrow.  Monitor vitals and adjust medications accordingly    # DM2 with hyperglycemia.  - Lantus 18U qd    -Carb count as ordered  - sliding scale insulin/glucose checks  - Holdf glipizide & metformin      # History of Cerebrovascular Accident (CVA):  - restart ASA in 72hrs, hold statin pending LFT's improving     # Urinary retention, s/p osman placed.  #  DEAN on Chronic Kidney Disease (CKD) Stage III:  - Avoid nephrotoxic medications to prevent further renal impairment.  -labs a.m.  - ? Element of worsening DEAN due to CRS physiology  - Resume spironolactone/bumex  - consider osman removal and TOV on 1/26  - Restart Finasteride. Start Flomax.     # Gouty Arthritis  -  Resume Allopurinol and ursodiol on d/c     # Insomnia/depression   -hold PTA trazodone.  - melatonin     # History of BRIANNA    - In the past did not tolerate CPAP.    - He declined CPAP trial in the hospital.          Diet: Combination Diet Moderate Consistent Carb (60 g CHO per Meal) Diet; Low Saturated Fat Na <2400mg Diet    DVT Prophylaxis: Pneumatic Compression Devices and ambulate as able  Osman Catheter: PRESENT, indication: Acute retention or obstruction  Lines: None     Cardiac Monitoring: None  Code Status: Full Code      Clinically Significant Risk Factors               # Hypoalbuminemia: Lowest albumin = 2.7 g/dL at 1/25/2025  7:55 AM, will monitor as appropriate     # Hypertension: Noted on problem list  # Chronic heart failure with reduced ejection fraction: last echo with EF <40%          # DMII: A1C = 9.1 % (Ref range: <5.7 %) within past 6 months, PRESENT ON ADMISSION  # Overweight: Estimated body mass index is 26.26 kg/m  as calculated from the following:    Height as of this encounter: 1.829 m (6').    Weight as of this encounter: 87.8 kg (193 lb 9.6 oz)., PRESENT ON ADMISSION     # Financial/Environmental Concerns:     # History of CABG: noted on surgical history       Social Drivers of Health    Tobacco Use: Medium Risk (1/23/2025)    Patient History     Smoking Tobacco Use: Former     Smokeless Tobacco Use: Never     Passive Exposure: Never          Disposition Plan     Medically Ready for Discharge: Anticipated Tomorrow             Isaac FERNANDEZ MD  Hospitalist Service  [facility]  Securely message with [employer] (more info)  Text page via [employer] Paging/Directory    ______________________________________________________________________    Interval History   Patient is new to me.  Patient seen and examined.  Notes, labs, imaging report personally reviewed.  Patient denied feeling short of breath or chest pain or dizziness.  Denied abdomen pain, nausea, vomiting denied feeling feverish.  Blood pressure trending up and home blood pressure medications gradually titrating up  Discussed with nursing staff.    Physical Exam   Vital Signs: Temp: 98.3  F (36.8  C) Temp src: Oral BP: (!) 158/89 Pulse: 80   Resp: 18 SpO2: 97 % O2 Device: Nasal cannula Oxygen Delivery: 2 LPM  Weight: 193 lbs 9.6 oz      General: Not in obvious distress.  HEENT: Normocephalic, supple neck  Chest: Clear to auscultation bilateral anteriorly, no wheezing  Heart: S1S2 normal, regular  Abdomen: Soft.  No tenderness appreciated.  Neuro: alert and awake, grossly non-focal        Medical Decision Making             Data     I have personally reviewed the following data over the past 24 hrs:    9.2  \   9.4 (L)   / 187     135 100 41.4 (H) /  186 (H)   3.9 24 1.31 (H) \     ALT: 69 AST: 62 (H) AP: 514 (H) TBILI: 1.5 (H)   ALB: 2.7 (L) TOT PROTEIN: 6.1 (L) LIPASE: N/A       Imaging results reviewed over the past 24 hrs:   No results found for this or any previous visit (from the past 24 hours).

## 2025-01-25 NOTE — PROGRESS NOTES
01/25/25 1446   Appointment Info   Signing Clinician's Name / Credentials (PT) Tsering Tee PT   Living Environment   People in Home facility resident   Current Living Arrangements assisted living   Self-Care   Equipment Currently Used at Home wheelchair, manual;walker, rolling  (4WW)   Activity/Exercise/Self-Care Comment mostly using manual w/c due to L knee OA awaiting TKA; independent w/c transfers and mobility and ADL's but has assist at [facility] if needed; assist IADL's   General Information   Onset of Illness/Injury or Date of Surgery 01/22/25   Referring Physician Isaac Rlaph   Patient/Family Therapy Goals Statement (PT) get knee replaced   Pertinent History of Current Problem (include personal factors and/or comorbidities that impact the POC) cholangitis s/p ERCP 1/23/25; PMH of CABG, CHF   Existing Precautions/Restrictions fall   Cognition   Affect/Mental Status (Cognition) WFL   Orientation Status (Cognition) oriented x 4   Follows Commands (Cognition) WFL   Range of Motion (ROM)   ROM Comment decreased L knee rom/strength; decreased B shld rom   Strength (Manual Muscle Testing)   Strength Comments decreased L knee rom/strength; crepitus L knee   Bed Mobility   Bed Mobility supine-sit;sit-supine   Supine-Sit Woods (Bed Mobility) modified independence   Sit-Supine Woods (Bed Mobility) supervision   Assistive Device (Bed Mobility) bed rails   Transfers   Transfers bed-chair transfer   Bed-Chair Transfer   Bed-Chair Woods (Transfers) supervision   Comment, (Bed-Chair Transfer) bed<>recliner no AD, use of chair arm rest and bed railing   Gait/Stairs (Locomotion)   Comment, (Gait/Stairs) unable to ambulate due to L knee pain/OA   Clinical Impression   Criteria for Skilled Therapeutic Intervention Evaluation only   Clinical Presentation (PT Evaluation Complexity) stable   Clinical Presentation Rationale pt presents as medically diagnosed   Clinical Decision Making (Complexity) low complexity    Risk & Benefits of therapy have been explained evaluation/treatment results reviewed;patient   PT Total Evaluation Time   PT Eval, Low Complexity Minutes (68355) 18   PT Discharge Planning   PT Plan d/c PT   PT Discharge Recommendation (DC Rec) home with assist   PT Rationale for DC Rec pt close to baseline mobility level with w/c transfers; has assist at [facility] as needed   PT Brief overview of current status supervision transfers   PT Total Distance Amb During Session (feet) 0   PT Equipment Needed at Discharge wheelchair  (pt has at home)   Physical Therapy Time and Intention   Total Session Time (sum of timed and untimed services) 18

## 2025-01-26 VITALS
HEART RATE: 84 BPM | RESPIRATION RATE: 18 BRPM | TEMPERATURE: 97.8 F | OXYGEN SATURATION: 100 % | HEIGHT: 72 IN | SYSTOLIC BLOOD PRESSURE: 151 MMHG | DIASTOLIC BLOOD PRESSURE: 81 MMHG | WEIGHT: 190.48 LBS | BODY MASS INDEX: 25.8 KG/M2

## 2025-01-26 LAB
ANION GAP SERPL CALCULATED.3IONS-SCNC: 8 MMOL/L (ref 7–15)
BUN SERPL-MCNC: 31.3 MG/DL (ref 8–23)
CALCIUM SERPL-MCNC: 9.1 MG/DL (ref 8.8–10.4)
CHLORIDE SERPL-SCNC: 100 MMOL/L (ref 98–107)
CREAT SERPL-MCNC: 1.18 MG/DL (ref 0.67–1.17)
EGFRCR SERPLBLD CKD-EPI 2021: 62 ML/MIN/1.73M2
GLUCOSE BLDC GLUCOMTR-MCNC: 158 MG/DL (ref 70–99)
GLUCOSE BLDC GLUCOMTR-MCNC: 177 MG/DL (ref 70–99)
GLUCOSE SERPL-MCNC: 148 MG/DL (ref 70–99)
HCO3 SERPL-SCNC: 29 MMOL/L (ref 22–29)
POTASSIUM SERPL-SCNC: 4.2 MMOL/L (ref 3.4–5.3)
PROCALCITONIN SERPL IA-MCNC: 3.42 NG/ML
SODIUM SERPL-SCNC: 137 MMOL/L (ref 135–145)

## 2025-01-26 PROCEDURE — 84145 PROCALCITONIN (PCT): CPT | Performed by: INTERNAL MEDICINE

## 2025-01-26 PROCEDURE — 250N000011 HC RX IP 250 OP 636: Performed by: INTERNAL MEDICINE

## 2025-01-26 PROCEDURE — 36415 COLL VENOUS BLD VENIPUNCTURE: CPT | Performed by: INTERNAL MEDICINE

## 2025-01-26 PROCEDURE — 82565 ASSAY OF CREATININE: CPT | Performed by: INTERNAL MEDICINE

## 2025-01-26 PROCEDURE — 80048 BASIC METABOLIC PNL TOTAL CA: CPT | Performed by: INTERNAL MEDICINE

## 2025-01-26 PROCEDURE — 99239 HOSP IP/OBS DSCHRG MGMT >30: CPT | Performed by: INTERNAL MEDICINE

## 2025-01-26 PROCEDURE — 250N000013 HC RX MED GY IP 250 OP 250 PS 637: Performed by: INTERNAL MEDICINE

## 2025-01-26 PROCEDURE — 99222 1ST HOSP IP/OBS MODERATE 55: CPT | Performed by: INTERNAL MEDICINE

## 2025-01-26 PROCEDURE — 84132 ASSAY OF SERUM POTASSIUM: CPT | Performed by: INTERNAL MEDICINE

## 2025-01-26 RX ORDER — LISINOPRIL 5 MG/1
10 TABLET ORAL DAILY
Status: DISCONTINUED | OUTPATIENT
Start: 2025-01-26 | End: 2025-01-26 | Stop reason: HOSPADM

## 2025-01-26 RX ORDER — ACETAMINOPHEN 325 MG/1
650 TABLET ORAL EVERY 8 HOURS PRN
Status: DISCONTINUED | OUTPATIENT
Start: 2025-01-26 | End: 2025-01-26 | Stop reason: HOSPADM

## 2025-01-26 RX ORDER — SODIUM BICARBONATE 650 MG/1
650 TABLET ORAL 3 TIMES DAILY
Status: SHIPPED
Start: 2025-01-26

## 2025-01-26 RX ORDER — BUMETANIDE 1 MG/1
1 TABLET ORAL DAILY
Status: DISCONTINUED | OUTPATIENT
Start: 2025-01-26 | End: 2025-01-26 | Stop reason: HOSPADM

## 2025-01-26 RX ORDER — METRONIDAZOLE 500 MG/1
500 TABLET ORAL ONCE
Status: COMPLETED | OUTPATIENT
Start: 2025-01-26 | End: 2025-01-26

## 2025-01-26 RX ORDER — METRONIDAZOLE 500 MG/1
500 TABLET ORAL 3 TIMES DAILY
Qty: 12 TABLET | Refills: 0 | Status: SHIPPED | OUTPATIENT
Start: 2025-01-27 | End: 2025-01-31

## 2025-01-26 RX ORDER — LEVOFLOXACIN 750 MG/1
750 TABLET, FILM COATED ORAL DAILY
Qty: 4 TABLET | Refills: 0 | Status: SHIPPED | OUTPATIENT
Start: 2025-01-27 | End: 2025-01-31

## 2025-01-26 RX ORDER — TAMSULOSIN HYDROCHLORIDE 0.4 MG/1
0.4 CAPSULE ORAL
Qty: 30 CAPSULE | Refills: 0 | Status: SHIPPED | OUTPATIENT
Start: 2025-01-27

## 2025-01-26 RX ORDER — ACETAMINOPHEN 500 MG
1000 TABLET ORAL EVERY 8 HOURS PRN
Status: SHIPPED
Start: 2025-01-26

## 2025-01-26 RX ORDER — LEVOFLOXACIN 750 MG/1
750 TABLET, FILM COATED ORAL ONCE
Status: COMPLETED | OUTPATIENT
Start: 2025-01-26 | End: 2025-01-26

## 2025-01-26 RX ADMIN — MEROPENEM 1 G: 1 INJECTION, POWDER, FOR SOLUTION INTRAVENOUS at 11:26

## 2025-01-26 RX ADMIN — METRONIDAZOLE 500 MG: 500 TABLET ORAL at 15:06

## 2025-01-26 RX ADMIN — SPIRONOLACTONE 25 MG: 25 TABLET, FILM COATED ORAL at 08:35

## 2025-01-26 RX ADMIN — TAMSULOSIN HYDROCHLORIDE 0.4 MG: 0.4 CAPSULE ORAL at 08:37

## 2025-01-26 RX ADMIN — MEROPENEM 1 G: 1 INJECTION, POWDER, FOR SOLUTION INTRAVENOUS at 03:16

## 2025-01-26 RX ADMIN — LISINOPRIL 10 MG: 5 TABLET ORAL at 11:26

## 2025-01-26 RX ADMIN — BUMETANIDE 1 MG: 1 TABLET ORAL at 08:35

## 2025-01-26 RX ADMIN — FINASTERIDE 5 MG: 5 TABLET, FILM COATED ORAL at 08:35

## 2025-01-26 RX ADMIN — LEVOFLOXACIN 750 MG: 750 TABLET, FILM COATED ORAL at 15:06

## 2025-01-26 RX ADMIN — Medication 2 SPRAY: at 08:35

## 2025-01-26 RX ADMIN — PANTOPRAZOLE SODIUM 40 MG: 40 TABLET, DELAYED RELEASE ORAL at 06:21

## 2025-01-26 RX ADMIN — METOPROLOL SUCCINATE 100 MG: 100 TABLET, EXTENDED RELEASE ORAL at 08:35

## 2025-01-26 ASSESSMENT — ACTIVITIES OF DAILY LIVING (ADL)
ADLS_ACUITY_SCORE: 49
ADLS_ACUITY_SCORE: 49
ADLS_ACUITY_SCORE: 50
ADLS_ACUITY_SCORE: 49
ADLS_ACUITY_SCORE: 49
ADLS_ACUITY_SCORE: 50
ADLS_ACUITY_SCORE: 49
ADLS_ACUITY_SCORE: 50
ADLS_ACUITY_SCORE: 50
ADLS_ACUITY_SCORE: 49
ADLS_ACUITY_SCORE: 50
ADLS_ACUITY_SCORE: 50
ADLS_ACUITY_SCORE: 49
ADLS_ACUITY_SCORE: 50

## 2025-01-26 NOTE — PLAN OF CARE
"  Problem: Adult Inpatient Plan of Care  Goal: Plan of Care Review  Description: The Plan of Care Review/Shift note should be completed every shift.  The Outcome Evaluation is a brief statement about your assessment that the patient is improving, declining, or no change.  This information will be displayed automatically on your shift  note.  Outcome: Met  Goal: Patient-Specific Goal (Individualized)  Description: You can add care plan individualizations to a care plan. Examples of Individualization might be:  \"Parent requests to be called daily at 9am for status\", \"I have a hard time hearing out of my right ear\", or \"Do not touch me to wake me up as it startles  me\".  Outcome: Met  Goal: Readiness for Transition of Care  Outcome: Met   Goal Outcome Evaluation:                        "

## 2025-01-26 NOTE — PLAN OF CARE
Problem: Adult Inpatient Plan of Care  Goal: Absence of Hospital-Acquired Illness or Injury  Outcome: Met  Intervention: Identify and Manage Fall Risk  Recent Flowsheet Documentation  Taken 1/26/2025 0030 by Adenike Danielson RN  Safety Promotion/Fall Prevention:   activity supervised   safety round/check completed   nonskid shoes/slippers when out of bed   lighting adjusted   increase visualization of patient   increased rounding and observation   clutter free environment maintained   supervised activity  Intervention: Prevent Skin Injury  Recent Flowsheet Documentation  Taken 1/26/2025 0247 by Adenike Danielson RN  Body Position:   turned   heels elevated  Taken 1/26/2025 0047 by Adenike Danielson RN  Body Position: heels elevated  Taken 1/25/2025 2247 by Adenike Danielson RN  Body Position: heels elevated  Taken 1/25/2025 2031 by Adenike Danielson RN  Body Position: heels elevated  Goal: Optimal Comfort and Wellbeing  Outcome: Met     Problem: Heart Failure  Goal: Effective Oxygenation and Ventilation  Outcome: Met  Intervention: Promote Airway Secretion Clearance  Recent Flowsheet Documentation  Taken 1/26/2025 0030 by Adenkie Danielson RN  Activity Management: activity adjusted per tolerance  Intervention: Optimize Oxygenation and Ventilation  Recent Flowsheet Documentation  Taken 1/26/2025 0047 by Adenike Danielson RN  Head of Bed (HOB) Positioning: HOB at 15 degrees  Taken 1/25/2025 2247 by Adenike Danielson RN  Head of Bed (HOB) Positioning: HOB at 20-30 degrees  Taken 1/25/2025 2031 by Adenike Danielson RN  Head of Bed (HOB) Positioning: HOB at 30 degrees  Goal: Effective Breathing Pattern During Sleep  Outcome: Met  Intervention: Monitor and Manage Obstructive Sleep Apnea  Recent Flowsheet Documentation  Taken 1/26/2025 0030 by Adenike Danielson RN  Medication Review/Management: medications reviewed   Goal Outcome Evaluation:    Alert and oriented. Med surg. Barnes catheter draining well. Denies pain. Using call light for  all needs. IV ABX administered per orders. Sleeping well. Continue with current plan of care.         BP (!) 163/91 (BP Location: Right arm)   Pulse 80   Temp 98.1  F (36.7  C) (Oral)   Resp 16   Ht 1.829 m (6')   Wt 87.8 kg (193 lb 9.6 oz)   SpO2 100%   BMI 26.26 kg/m      Adenike Danielson RN

## 2025-01-26 NOTE — CONSULTS
Consultation - Infectious Disease  [facility]  Lance Ibrahim,  1945, MRN [id number]    Admitting Dx: Cholangitis (H) [K83.09]    PCP: Rickey Adamson, [phone]       ASSESSMENT   79-year-old man with a history of coronary artery disease, HFpEF, diabetes, common bile duct stricture, admitted with cholangitis.  ID is consulted regarding antibiotic options.    Acute cholangitis.  Presenting with abdominal pain and elevated liver enzymes.  Ultrasound showing bile duct dilation.  Seen by general surgery and GI.  Status post ERCP on  with stent exchange.  Now with normalization of white count.  Procalcitonin significantly improved.  Clinically doing well  Gram-negative bacteremia.  2 of 2 blood cultures on admission growing Klebsiella pneumonia, 1 of 2 growing Klebsiella oxytoca.  Presumed secondary to cholangitis.  Currently on meropenem  Multiple antibiotic allergies.  Severe reactions to penicillin, cephalexin, and cefazolin.  Rash with sulfa.    Principal Problem:    Cholangitis (H)  Active Problems:    Diabetes mellitus type 2, uncomplicated (H)    Sepsis (H)    Chronic systolic congestive heart failure (H)       PLAN   -Okay to discharge with levofloxacin 750 mg p.o. daily and metronidazole 500 mg p.o. 3 times daily.  7-day course following source control is appropriate, until     Thank you for this consult.  ID will sign off    Koko Wolfe MD  [facility]  Direct messaging: [facility] Paging  On-Call ID provider: [phone], option: 9      ===========================================      Chief Complaint   Cholangitis (H)       HPI     We have been requested by Isaac FERNANDEZ MD to evaluate Lance Ibrahim for the above.    History obtained by patient    Lance Ibrahim is a 79 year old man with a history of coronary artery disease, diabetes, HFpEF.  He has a history of common bile duct stricture.  He presented with weakness and abdominal pain.  There is concern for  cholangitis based on imaging and labs.  He was seen by general surgery and GI.  He is status post ERCP on 1/23 with biliary duct stent exchange.  He has been on broad-spectrum antibiotics.  He has no acute complaints today.  He is feeling well.  He says that he has been urinating frequently, but denies dysuria.  No abdominal pain.  He does not like the food here, but he has a good appetite.          Review of Systems   Ten systems reviewed and negative except for what is noted in the HPI       Medical History  Past Medical History:   Diagnosis Date    Anemia     Arthritis     osteoarthritis knees    BPH     CAD (coronary artery disease)     subtotal occlusion in the small distal LAD     Cardiomyopathy     Cerebral artery occlusion with cerebral infarction     TIA 1993, no residual    Cervicalgia     CHF (congestive heart failure) (H)     Chronic kidney disease     Chronic low back pain     Chronic rhinitis     Gouty arthropathy     hands    Hyperlipidemia     Hypertension     Kidney stone     Nocturia     Obesity     Osteomyelitis (H) 08/2023    Foot    PVD (peripheral vascular disease)     Sleep apnea     doesn't use cpap    Spinal cord stimulator status 04/2024    Spinal cord stimulator in place    TIA (transient ischaemic attack) 1993    Type 2 diabetes mellitus - 11/08/2018    Ureteral stone     Surgical History  He  has a past surgical history that includes Diastasis recti repair (1985); Ventral hernia repair NOS (1987); ORIF Shoulder (Left); Colonoscopy (03/09/2013); hernia repair (07/13/2004); Foot surgery (04/2013); Amputate toe(s) (Left, 10/15/2018); Arthroplasty knee (Right, 12/07/2018); Heart Catheterization with Possible Intervention (Left, 03/05/2019); Extracapsular cataract extration with intraocular lens implant (Left, 03/13/2017); Extracapsular cataract extration with intraocular lens implant (Right); Amputate foot (Right, 08/07/2023); Esophagoscopy, gastroscopy, duodenoscopy (EGD), combined (N/A,  "04/30/2024); Endoscopic retrograde cholangiopancreatogram (N/A, 04/30/2024); Spinal Cord Stimulator Implant (04/03/2024); Prostate surgery (02/2024); IR Disc Aspriation Injection (6/19/2024); Irrigation and debridement shoulder, combined (Right, 8/7/2024); Resect clavicle distal (Right, 8/7/2024); Irrigation and debridement upper extremity, combined (Right, 8/7/2024); Coronary Angiogram (N/A, 11/14/2024); Intravascular Ultrasound (N/A, 11/14/2024); Left Heart Catheterization (N/A, 11/14/2024); Intra aortic Balloon Pump Insertion (N/A, 11/16/2024); Coronary Artery Bypass Graft, With Endoscopic Vessel Procurement (N/A, 11/18/2024); Transesophageal echocardiogram intraoperative (N/A, 11/18/2024); Esophagoscopy, gastroscopy, duodenoscopy (EGD), dilatation, combined (N/A, 1/23/2025); Esophagoscopy, gastroscopy, duodenoscopy (EGD), combined (N/A, 1/23/2025); Endoscopic retrograde cholangiopancreatogram (N/A, 1/23/2025); and Endoscopic retrograde cholangiopancreatogram (N/A, 1/23/2025).     Social History  Reviewed, and he  reports that he quit smoking about 31 years ago. His smoking use included cigarettes. He has never been exposed to tobacco smoke. He has never used smokeless tobacco. He reports that he does not currently use alcohol. He reports that he does not use drugs.  Social History     Social History Narrative    Not on file     Family History  family history includes Alcohol/Drug in his paternal grandfather; Cancer in his father; Diabetes in his maternal grandfather; Family History Negative in his mother.  family history reviewed and is not pertinent to the presenting problem.            Allergies     Allergies   Allergen Reactions    Ancef [Cefazolin] Rash    Cephalexin Itching and Rash     Allergic reaction required hospitalization 4/2024    Penicillins Anaphylaxis    Other Drug Allergy (See Comments) Unknown    Sulfa Antibiotics      \"itchy rash, swelling of face and hands\"         Antibiotics   Meropenem " 1/23-    Previous:  Ciprofloxacin 1/22  Doxycycline 1/22  Ertapenem 1/22  Flagyl 1/22      Physical Exam     Temp:  [97.8  F (36.6  C)-98.1  F (36.7  C)] 97.8  F (36.6  C)  Pulse:  [80-92] 92  Resp:  [16-18] 18  BP: (144-182)/(72-91) 182/90  SpO2:  [96 %-100 %] 97 %    BP (!) 182/90 (BP Location: Right arm)   Pulse 92   Temp 97.8  F (36.6  C) (Oral)   Resp 18   Ht 1.829 m (6')   Wt 86.4 kg (190 lb 7.6 oz)   SpO2 97%   BMI 25.83 kg/m      GENERAL:  well-developed, well-nourished, lying in bed in no acute distress.   HENT:  Head is normocephalic, atraumatic.   EYES:  Eyes have anicteric sclerae without conjunctival injection or stigmata of endocarditis.   NECK:  Supple.  LUNGS:  Clear to auscultation.  CARDIOVASCULAR:  Regular rate and rhythm with no murmurs, gallops or rubs.  ABDOMEN:  Normal bowel sounds, soft, nontender. No appreciable hepatosplenomegaly  EXT: Extremities warm and without edema.  SKIN:  No acute rashes. No stigmata of endocarditis.  NEUROLOGIC:  Grossly nonfocal.      Cultures   1/22 blood cultures 2 of 2 with Klebsiella pneumoniae, 1 of 2 with Klebsiella oxytoca  1/24 blood cultures x 2: No growth to date    Susceptibility data from last 90 days.  Collected Specimen Info Organism Ampicillin Ampicillin/Sulbactam Cefepime Ceftazidime Ceftriaxone Ciprofloxacin Gentamicin Levofloxacin Meropenem Piperacillin/Tazobactam Trimethoprim/Sulfamethoxazole    01/22/25 Peripheral Blood Klebsiella oxytoca R  S  S  S  S  S  S  S  S  S  S     Klebsiella pneumoniae              01/22/25 Peripheral Blood Klebsiella pneumoniae R  S  S  S  S  S  S  S  S  S  S        Laboratory results     Recent Labs   Lab 01/25/25  0755 01/24/25  0437 01/23/25  0445   WBC 9.2 14.5* 14.2*   HGB 9.4* 9.5* 9.9*    215 195       Recent Labs   Lab 01/26/25  0511 01/25/25  0755 01/24/25  0437 01/23/25  0445    135 138 139   CO2 29 24 22 23   BUN 31.3* 41.4* 44.1* 37.2*   ALBUMIN  --  2.7* 2.9* 3.0*   ALKPHOS  --  514*  "379* 299*   ALT  --  69 75* 74*   AST  --  62* 92* 103*       No results for input(s): \"CRPI\", \"SED\" in the last 168 hours.        Imaging   Radiology results reviewed    XR Surgery CANDELARIA Fluoro L/T 5 Min    Result Date: 1/23/2025  This exam was marked as non-reportable because it will not be read by a radiologist or a [facility] non-radiologist provider.     US Abdomen Limited (RUQ)    Result Date: 1/22/2025  EXAM: US ABDOMEN LIMITED LOCATION: [facility] DATE: 1/22/2025 INDICATION: fever. History of biliary stent. COMPARISON: CT 1/6/2025 TECHNIQUE: Limited abdominal ultrasound. FINDINGS: GALLBLADDER: There is gallbladder sludge. No stones seen. There is diffuse gallbladder wall thickening up to 4 mm. Trace pericholecystic fluid. No sonographic Garcia's. BILE DUCTS: Extra and intrahepatic bile duct dilation. The common duct measures up to 15 mm. LIVER: Enlarged measuring 21.3 cm MCL, craniocaudal. Coarsened echotexture, suggesting underlying fibrosis. Nodular contour. Diffuse hepatic fatty infiltration. No focal mass. RIGHT KIDNEY: No hydronephrosis. PANCREAS: Mostly obscured by gas. No ascites.     IMPRESSION: 1.  Enlarged echogenic liver could represent steatohepatitis. 2.  Nodular surface contour consistent with fibrosis. 3.  Gallbladder wall thickening which could be due to acute or chronic cholecystitis, and cirrhosis. 4.  Bile duct dilation.     XR Chest 2 Views    Result Date: 1/22/2025  EXAM: XR CHEST 2 VIEWS LOCATION: [facility] DATE: 1/22/2025 INDICATION: Shortness of breath. COMPARISON: Chest radiograph 1/6/2025. CT chest 1/6/2025.     IMPRESSION: Cardiomegaly with central pulmonary vascular congestion, mid and lower lung predominant mild interstitial edema, and trace bilateral pleural effusions, similar to 1/6/2025. No new airspace opacities. No pneumothorax. Aortic atherosclerotic calcifications. Unchanged median sternotomy wires, spinal stimulator leads, " mediastinal clips, and left humeral screws. An additional electronic device overlying the mediastinum on the lateral view does not have a correlate on the frontal view and is likely within the overlying structures.      Data reviewed today: I reviewed all medications, new labs and imaging results over the last 24 hours. I personally reviewed the abd u/s image(s) showing bile duct dilation .  The patient's care was discussed with the Patient.

## 2025-01-26 NOTE — PROGRESS NOTES
Care Management Discharge Note    Discharge Date: 01/26/2025       Discharge Disposition: Assisted Living, Home Care    Discharge Services:      Discharge DME: None    Discharge Transportation: other (see comments) ([facility] w/c transport)    Private pay costs discussed: transportation costs    Does the patient's insurance plan have a 3 day qualifying hospital stay waiver?  No    PAS Confirmation Code:    Patient/family educated on Medicare website which has current facility and service quality ratings: no    Education Provided on the Discharge Plan: Yes  Persons Notified of Discharge Plans: patient, son and [facility]  Patient/Family in Agreement with the Plan: yes    Handoff Referral Completed: Yes, [facility] PCP: Internal handoff referral completed    Additional Information:    Notified by provider pt medically cleared to discharge today.  New prescriptions for flomax and antibiotics ordered, sent to [facility].  Pt open to [facility] for home PT. Provider aware and will place resumption orders.   Per chart review noted pt is from [facility].    [phone].  [facility] confirmed no concerns with pt returning to [facility] today.  Reviewed discharge prescriptions sent to [facility].   Agreed to fax final discharge orders to [phone].    Met with pt.  Reviewed anticipated plan for discharge.  Pt called his son Omari.  Reviewed plan for discharge.  Reviewed/discussed transportation options for discharge.  Plan for [facility] w/c transport.  Pt and son aware that w/c would be private pay.  Pt and son noted no concerns regarding plan for discharge.    [facility] w/c transport confirmed for window 3050-6290.  Updated Tiburcio PAULA.    Final discharge orders faxed to [facility] via [employer].    Di Garcia, RN BSN, PHN, ACM-RN  Nurse Coordinator   Covering [facility].

## 2025-01-26 NOTE — DISCHARGE SUMMARY
[facility] MEDICINE  DISCHARGE SUMMARY     Primary Care Physician: Rickey Adamson  Admission Date: 1/22/2025   Discharge Provider: Isaac FERNANDEZ MD Discharge Date: 1/26/2025   Diet:   Active Diet and Nourishment Order   Procedures    Combination Diet Moderate Consistent Carb (60 g CHO per Meal) Diet; Low Saturated Fat Na <2400mg Diet    Diet       Code Status: Full Code   Activity: DCACTIVITY: Activity as tolerated        Condition at Discharge: Stable     REASON FOR PRESENTATION(See Admission Note for Details)   Acute cholangitis.  Please refer to H&P for details    PRINCIPAL & ACTIVE DISCHARGE DIAGNOSES     Principal Problem:    Cholangitis (H)  Active Problems:    Diabetes mellitus type 2, uncomplicated (H)    Sepsis (H)    Chronic systolic congestive heart failure (H)      PENDING LABS     Unresulted Labs Ordered in the Past 30 Days of this Admission       Date and Time Order Name Status Description    1/24/2025 12:01 AM Blood Culture Hand, Right Preliminary     1/24/2025 12:01 AM Blood Culture Arm, Right Preliminary     1/23/2025  2:59 PM Cytology, non-gynecologic In process     1/22/2025 11:29 AM Blood Culture Peripheral Blood Preliminary               PROCEDURES ( this hospitalization only)      Procedure(s):  ENDOSCOPIC RETROGRADE CHOLANGIOPANCREATOGRAPHY, DILATED THE BILE STRICTURE,  WASHINGS, SENT WASHINGS TO CYTOLOGY  STENT REMOVAL,  STENT PLACEMENT    RECOMMENDATIONS TO OUTPATIENT PROVIDER FOR F/U VISIT     Follow-up Appointments       Follow Up      Follow up with [facility] as recommended for repeat ERCP.  Please call [facility] at [phone] if you do not hear from them in next 2-3 business days.        Hospital Follow-up with Existing Primary Care Provider (PCP)      Please see details below         Schedule Primary Care visit within: 7 Days   Recommended labs and Imaging (to be ordered by Primary Care Provider): CBC, CMP                   DISPOSITION     Assisted  Living Facility    SUMMARY OF HOSPITAL COURSE:      Lance Ibrahim, an 79-y. 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    Patient underwent ERCP with stent exchange.  Patient had gram-negative bacteremia, received IV meropenem during hospitalization then changed to oral Levaquin and Flagyl to complete antibiotic course on 1/30 per ID recommendation.      Please refer below for detail hospital course.    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 Cholangiogram noted distal extrahepatic duct stricture s/p dilation; brushing sent for cytology.   - Gram-negative bacteremia, 2/2 bottles, BC 1/22/2025.  Repeat BC so far no growth.  Currently on meropenem, continue  - GI/Surgery assistance appreciated  - Repeat ERCP in 10 to 12 weeks for stent exchange.  GI will arrange outpatient follow-up  --LFT trending down, trend as an outpatient through PCP     # Acute respiratory failure with hypoxia, resolved  # HFrEF w/o ADHF  # CABG x 3V (11/2024)  - BNP 22,000, historically running high, 15-19,000.    -  TTE (11/14/24): LVEF 20 to 25% with global LV hypokinesia, mod decrease RV systolic fxn, 1+ AR/TR/MR  -PTA Aldactone, Bumex, beta-blocker     # Essential hypertension; trending up due to holding PTA medications  - PTA Toprol-XL, Aldactone, Bumex, lisinopril..       # DM2 with hyperglycemia.  - Lantus 18U qd.    -PTA glipizide and metformin.  Monitor blood sugar as  recommended.     # History of Cerebrovascular Accident (CVA):  - PTA aspirin and statin.    # Urinary retention, likely due to BPH  # DEAN on Chronic Kidney Disease (CKD) Stage III:  - Avoid nephrotoxic medications to prevent further renal impairment.  -labs a.m.  - ? Element of worsening DEAN due to CRS physiology  - Restart Finasteride. Start Flomax.  Barnes catheter removed 1 day after discharge and voided     # Gouty Arthritis  -  Resume Allopurinol and ursodiol      # Insomnia/depression  -PTA trazodone, melatonin.     # History of BRIANNA    - In the past did not tolerate CPAP.    - He declined CPAP trial in the hospital.    Patient reported he lives in assisted living facility.  Patient reports wheelchair at baseline.  Patient reported he gets help for his laundry, meals, medications.  Patient evaluated by therapy team.  /care manager, resume prior to admission home care.      Discharge Medications with Med changes:     Current Discharge Medication List        START taking these medications    Details   levofloxacin (LEVAQUIN) 750 MG tablet Take 1 tablet (750 mg) by mouth daily for 4 days.  Qty: 4 tablet, Refills: 0    Associated Diagnoses: Bacteremia      metroNIDAZOLE (FLAGYL) 500 MG tablet Take 1 tablet (500 mg) by mouth 3 times daily for 4 days.  Qty: 12 tablet, Refills: 0    Associated Diagnoses: Bacteremia      tamsulosin (FLOMAX) 0.4 MG capsule Take 1 capsule (0.4 mg) by mouth daily (with dinner). Please call PCP for refill  Qty: 30 capsule, Refills: 0    Associated Diagnoses: Urinary retention due to benign prostatic hyperplasia           CONTINUE these medications which have CHANGED    Details   acetaminophen (TYLENOL) 500 MG tablet Take 2 tablets (1,000 mg) by mouth every 8 hours as needed for mild pain. TID while awake and PRN nightly    Associated Diagnoses: Pain      sodium bicarbonate 650 MG tablet Take 1 tablet (650 mg) by mouth 3 times daily.    Associated Diagnoses: S/P CABG x 3            CONTINUE these medications which have NOT CHANGED    Details   fluticasone (FLONASE) 50 MCG/ACT nasal spray Spray 2 sprays into both nostrils daily.      allopurinol (ZYLOPRIM) 100 MG tablet Take 100 mg by mouth daily.      aspirin (ASA) 81 MG chewable tablet Take 2 tablets (162 mg) by mouth daily.    Associated Diagnoses: S/P CABG x 3      atorvastatin (LIPITOR) 40 MG tablet Take 1 tablet (40 mg) by mouth every evening.    Associated Diagnoses: S/P CABG x 3      blood glucose monitoring (NO BRAND SPECIFIED) meter device kit Use to test blood sugar 2 times daily or as directed.  Qty: 1 kit, Refills: 0    Associated Diagnoses: Type 2 diabetes mellitus with peripheral vascular disease (H)      bumetanide (BUMEX) 1 MG tablet Take 1 tablet (1 mg) by mouth daily.    Associated Diagnoses: S/P CABG x 3      calcium carbonate (TUMS) 500 MG chewable tablet Take 1,000 mg by mouth 3 times daily as needed for heartburn      Continuous Glucose Sensor (DEXCOM G7 SENSOR) MISC 1 each every 10 days. Change sensor every 10 days  Qty: 9 each, Refills: 5    Associated Diagnoses: Type 2 diabetes mellitus with peripheral vascular disease (H)      diclofenac (VOLTAREN) 1 % topical gel Apply 2 g topically 2 times daily as needed for moderate pain.      Ferrous Gluconate 324 (37.5 Fe) MG TABS Take 1 tablet by mouth daily.      finasteride (PROSCAR) 5 MG tablet Take 5 mg by mouth daily      gabapentin (NEURONTIN) 300 MG capsule Take 1 capsule (300 mg) by mouth 3 times daily as needed.    Associated Diagnoses: S/P CABG x 3      glipiZIDE (GLUCOTROL XL) 2.5 MG 24 hr tablet Take 1 tablet (2.5 mg) by mouth daily.    Associated Diagnoses: S/P CABG x 3      insulin glargine (LANTUS PEN) 100 UNIT/ML pen Inject 18 Units subcutaneously 2 times daily.      lisinopril (ZESTRIL) 30 MG tablet Take 1 tablet (30 mg) by mouth daily.  Qty: 90 tablet, Refills: 3    Associated Diagnoses: Hypertension, unspecified type      metFORMIN (GLUCOPHAGE XR) 500 MG  24 hr tablet Take 1 tablet (500 mg) by mouth daily (with dinner).  Qty: 30 tablet, Refills: 5    Associated Diagnoses: Type 2 diabetes mellitus with peripheral vascular disease (H)      metoprolol succinate ER (TOPROL XL) 100 MG 24 hr tablet Take 1 tablet (100 mg) by mouth daily.    Associated Diagnoses: S/P CABG x 3      pantoprazole (PROTONIX) 40 MG EC tablet Take 1 tablet (40 mg) by mouth every morning (before breakfast).    Associated Diagnoses: S/P CABG x 3      spironolactone (ALDACTONE) 25 MG tablet Take 1 tablet (25 mg) by mouth daily.    Associated Diagnoses: S/P CABG x 3      traZODone (DESYREL) 50 MG tablet Take 25 mg by mouth at bedtime.      ursodiol (ACTIGALL) 300 MG capsule Take 1 capsule (300 mg) by mouth 2 times daily  Qty: 60 capsule, Refills: 0    Associated Diagnoses: Biliary stricture (H)      White Petrolatum-Mineral Oil (LUBRIFRESH P.M.) OINT Apply 1 g to eye 2 times daily as needed for dry eyes.    Associated Diagnoses: S/P CABG x 3                   Rationale for medication changes:      Prior to admission, patient on very high dose of bicarb tablets, on BMP bicarb 29 so decrease dose significantly.  Lab monitoring through PCP as an outpatient and adjust dose        Consults       GASTROENTEROLOGY IP CONSULT  CARE MANAGEMENT / SOCIAL WORK IP CONSULT  PHYSICAL THERAPY ADULT IP CONSULT  OCCUPATIONAL THERAPY ADULT IP CONSULT  PHARMACY IP CONSULT  INFECTIOUS DISEASES IP CONSULT    Immunizations given this encounter     Most Recent Immunizations   Administered Date(s) Administered    COVID-19 12+ (Pfizer) 10/03/2024    COVID-19 Bivalent 12+ (Pfizer) 12/07/2022    COVID-19 MONOVALENT 12+ (Pfizer) 10/11/2021    COVID-19 Monovalent 12+ (Pfizer 2022) 12/07/2022    COVID-19 Monovalent 18+ (Moderna) 05/12/2022    Flu 65+ (Fluad) 08/24/2019    Flu, Unspecified 10/01/2023    Influenza (High Dose) Trivalent,PF (Fluzone) 10/03/2024    Influenza (IIV3) PF 10/20/2014    Influenza Vaccine 65+ (FLUAD)  "10/03/2023    Influenza Vaccine 65+ (Fluzone HD) 09/06/2022    Mantoux Tuberculin Skin Test 08/16/2024    Pneumo Conj 13-V (2010&after) 10/19/2015    Pneumococcal 23 valent 12/22/2011    TDAP (Adacel,Boostrix) 07/14/2003    TDAP Vaccine (Adacel) 04/18/2013    Td (Adult), Adsorbed 07/14/2003    Zoster Vaccine, Unspecified (historical) 07/15/2023    Zoster recombinant adjuvanted (SHINGRIX) 07/15/2023    Zoster vaccine, live 10/15/2023           Anticoagulation Information      Recent INR results: No results for input(s): \"INR\" in the last 168 hours.        SIGNIFICANT IMAGING FINDINGS     No results found for this visit on 01/22/25.    SIGNIFICANT LABORATORY FINDINGS     Most Recent 3 CBC's:  Recent Labs   Lab Test 01/25/25  0755 01/24/25  0437 01/23/25  0445   WBC 9.2 14.5* 14.2*   HGB 9.4* 9.5* 9.9*   MCV 88 91 90    215 195     Most Recent 3 BMP's:  Recent Labs   Lab Test 01/26/25  1228 01/26/25  0716 01/26/25  0511 01/25/25  0805 01/25/25  0755 01/24/25  0712 01/24/25  0437   NA  --   --  137  --  135  --  138   POTASSIUM  --   --  4.2  --  3.9  --  4.2   CHLORIDE  --   --  100  --  100  --  104   CO2  --   --  29  --  24  --  22   BUN  --   --  31.3*  --  41.4*  --  44.1*   CR  --   --  1.18*  --  1.31*  --  1.58*   ANIONGAP  --   --  8  --  11  --  12   SHANNAN  --   --  9.1  --  8.9  --  9.1   * 158* 148*   < > 143*   < > 145*    < > = values in this interval not displayed.     Most Recent 2 LFT's:  Recent Labs   Lab Test 01/25/25  0755 01/24/25  0437   AST 62* 92*   ALT 69 75*   ALKPHOS 514* 379*   BILITOTAL 1.5* 2.2*       Discharge Orders        Primary Care - Care Coordination Referral      Reason for your hospital stay    Patient admitted for cholangitis, bacteremia.     Activity    Your activity upon discharge: activity as tolerated     Monitor and record    Blood glucose: 4 times a day, before meals and at bedtime.  Please call PCP blood sugar less than 70 or more than 300     Follow Up    " Follow up with [facility] as recommended for repeat ERCP.  Please call [facility] at [phone] if you do not hear from them in next 2-3 business days.     Resume Home Care Services     Diet    Follow this diet upon discharge: Current Diet:Orders Placed This Encounter      Combination Diet Moderate Consistent Carb (60 g CHO per Meal) Diet; Low Saturated Fat Na <2400mg Diet     Hospital Follow-up with Existing Primary Care Provider (PCP)    Please see details below            Examination   Physical Exam   Temp:  [97.8  F (36.6  C)-98.1  F (36.7  C)] 97.8  F (36.6  C)  Pulse:  [80-92] 92  Resp:  [16-18] 18  BP: (144-182)/(72-91) 182/90  SpO2:  [96 %-100 %] 97 %  Wt Readings from Last 1 Encounters:   01/26/25 86.4 kg (190 lb 7.6 oz)       Patient seen and examined.  Notes, labs, imaging report personally reviewed.  Patient denied feeling dizzy or lightheaded.  Denies short of breath or chest pain.  Denied abdomen pain, nausea, vomiting.  Denied feeling fever or chills.  Discussed with nursing staff, reported patient voided after removing Barnes catheter.  Appreciate ID input.  Detail plan of care after discharge discussed with patient.  Discussed with care manager/.      General: Not in obvious distress.  HEENT: Normocephalic, supple neck  Chest: Clear to auscultation bilateral anteriorly, no wheezing  Heart: S1S2 normal, regular  Abdomen: Soft. NT, ND. Bowel sounds- active.  Neuro: alert and awake, follows simple commands appropriately          Please see EMR for more detailed significant labs, imaging, consultant notes etc.    Isaac PHILLIPS MD, personally saw the patient today and spent greater than 30 minutes discharging this patient.    Isaac FERNANDEZ MD  [facility]    CC:Rickey Adamson

## 2025-01-26 NOTE — PLAN OF CARE
"Goal Outcome Evaluation:      Problem: Adult Inpatient Plan of Care  Goal: Patient-Specific Goal (Individualized)  Description: You can add care plan individualizations to a care plan. Examples of Individualization might be:  \"Parent requests to be called daily at 9am for status\", \"I have a hard time hearing out of my right ear\", or \"Do not touch me to wake me up as it startles  me\".  Outcome: Progressing     Problem: Adult Inpatient Plan of Care  Goal: Optimal Comfort and Wellbeing  Outcome: Progressing     Problem: Heart Failure  Goal: Stable Heart Rate and Rhythm  Outcome: Progressing     Problem: Heart Failure  Goal: Optimal Functional Ability  Outcome: Progressing    A/O x4. VSS. Sat 96% on RA during the day. Put on 1L oxygen per NC late afternoon when sat dropped to upper 80s. Denied pain. Barnes catheter cares done. Good urine output. Good oral intake. Assist of one with transfers to chair.                            "

## 2025-01-27 LAB
BACTERIA BLD CULT: ABNORMAL
BACTERIA BLD CULT: ABNORMAL
PATH REPORT.COMMENTS IMP SPEC: ABNORMAL
PATH REPORT.COMMENTS IMP SPEC: ABNORMAL
PATH REPORT.COMMENTS IMP SPEC: YES
PATH REPORT.FINAL DX SPEC: ABNORMAL
PATH REPORT.GROSS SPEC: ABNORMAL
PATH REPORT.MICROSCOPIC SPEC OTHER STN: ABNORMAL

## 2025-01-27 NOTE — PLAN OF CARE
Occupational Therapy Discharge Summary    Reason for therapy discharge:    Discharged to home with home therapy.    Progress towards therapy goal(s). See goals on Care Plan in [facility] electronic health record for goal details.  Goals partially met.  Barriers to achieving goals:   discharge from facility.    Therapy recommendation(s):    Continued therapy is recommended.  Rationale/Recommendations:  recommend home care OT services.    Estee Frank OTR/L 1/27/25

## 2025-01-28 LAB
BACTERIA BLD CULT: ABNORMAL

## 2025-01-29 LAB
BACTERIA BLD CULT: NO GROWTH
BACTERIA BLD CULT: NO GROWTH

## 2025-04-23 NOTE — PROGRESS NOTES
GASTROENTEROLOGY PROGRESS NOTE     SUBJECTIVE   The patient hopes to discharge to home. Denies having abdominal pain. Tolerating diet. Denies diarrhea. He believes the pruritus is improving.      OBJECTIVE     Vitals Blood pressure (!) 146/73, pulse 87, temperature 98.1  F (36.7  C), temperature source Oral, resp. rate 16, height 1.829 m (6'), weight 94.8 kg (209 lb), SpO2 93%.          Physical Exam  General: awake, alert, responds appropriately   Cardiovascular: S1S2, no edema   Chest: lungs are clear    Abdomen: +bs, soft, not tender   Neurologic: grossly intact        LABORATORY    ELECTROLYTE PANEL   Recent Labs   Lab 05/02/24  1059 05/02/24  0656 05/02/24  0623 05/01/24  1206 05/01/24 0703 04/30/24 2304 04/30/24 2156   NA  --   --  137  --  138  --  135   POTASSIUM  --   --  3.6  --  3.9  --  3.5   CHLORIDE  --   --  103  --  103  --  100   CO2  --   --  23  --  19*  --  17*   * 195* 210*   < > 312*   < > 369*   CR  --   --  1.53*  --  1.69*  --  1.59*   BUN  --   --  25.7*  --  29.1*  --  27.9*    < > = values in this interval not displayed.      HEMATOLOGY PANEL   Recent Labs   Lab 05/02/24 0623 05/01/24 0703 04/30/24 2156 04/27/24 0559 04/26/24  0430   HGB 10.1* 10.5* 10.5*   < > 11.9*   MCV 88 86 87   < > 87   WBC 10.2 11.9* 10.1   < > 11.4*    215 204   < > 253   INR  --   --   --   --  1.11    < > = values in this interval not displayed.      LIVER AND PANCREAS PANEL   Recent Labs   Lab 05/02/24 0623 05/01/24 0703 04/30/24 2156 04/28/24 0523 04/27/24  0559 04/26/24  0430   * 163* 178*   < > 268* 251*   * 241* 257*   < > 301* 320*   ALKPHOS 648* 750* 780*   < > 605* 589*   BILITOTAL 3.3* 4.6* 6.5*   < > 7.1* 5.0*   LIPASE  --   --  100*  --  163* 444*    < > = values in this interval not displayed.     IMAGING STUDIES      EXAM: MR ABDOMEN MRCP W/O and W CONTRAST  LOCATION: [facility]  DATE: 4/26/2024     INDICATION: Elevated  Spoke with Hoa(wife), regarding Neuorpsych testing. She stated patient refuse to have it done for no reasons.   - Hoa states patient agree to do  Sleep study.     JUST FYI!   LFTs.  COMPARISON: 04/26/2024 CT and ultrasound  TECHNIQUE: Routine MR liver/pancreas protocol including axial and coronal MRCP sequences. 2D and 3D reconstruction performed by MR technologist including MIP reconstruction and slab cholangiograms. If performed with contrast, additional dynamic T1 post   IV contrast images.  CONTRAST: 9mL Gadavist      FINDINGS:      MRCP: The gallbladder is distended. There is intrahepatic bile duct dilatation. The common bile duct is dilated to the mid duct, where there is a 15 mm length of nonvisualization in the region of the pancreatic head. The distal common bile duct is small   in caliber. The pancreatic duct is normal in caliber. Multiple small cystic lesions measuring up to 9 mm are along the course of the pancreatic duct.     ADDITIONAL FINDINGS: No definite mass identified in the region of the pancreas. No lymphadenopathy. A few small simple left renal cysts do not require follow-up. There is atherosclerotic disease.                                                                         IMPRESSION:  1.  Bile ducts dilatation and gallbladder distention due to narrowing or occlusion of the common bile duct. The source of narrowing is not identified on this study, though a pancreatic mass is possible. ERCP should be considered if not already performed.  2.  Multiple small cystic lesions along the course of the pancreatic duct. 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 Recommend CT or MRI/MRCP in 6 months.        EXAM: CT ABDOMEN PELVIS W/O CONTRAST  LOCATION: [facility]  DATE: 4/26/2024     INDICATION: Elevated LFTs, lipase.  No significant acute findings found on ultrasound.  COMPARISON: 12/03/2021.  TECHNIQUE: CT scan of the abdomen and pelvis was performed without IV contrast. Multiplanar reformats were obtained. Dose reduction techniques were used.  CONTRAST: None.     FINDINGS:   LOWER CHEST: There is a 2.1 cm  nodule or nodular infiltrate in the left lower lobe on the first image of this exam and not imaged in its entirety.     HEPATOBILIARY: The gallbladder is distended but otherwise appears normal. No calcified gallstone.     PANCREAS: Normal.     SPLEEN: Normal.     ADRENAL GLANDS: Stable small left adrenal gland nodule.     KIDNEYS/BLADDER: No urinary stone or hydronephrosis. Cyst at the upper pole of the left kidney.     BOWEL: Colonic diverticula without acute diverticulitis. No bowel obstruction or inflammation. Moderate amount of stool in the colon. No free intraperitoneal gas or fluid.     LYMPH NODES: Normal.     VASCULATURE: Atherosclerotic calcification of the aorta and its branches. No aneurysm.     PELVIC ORGANS: Probable radiation seeds in the prostate gland.     MUSCULOSKELETAL: Anterior abdominal wall mesh. Small right inguinal hernia containing fat. Spinal stimulator implanted in the subcutaneous fat of the right buttock. Degenerative disease throughout spine.                                                                      IMPRESSION:   1.  Left lower lobe nodule or nodular infiltrate, incompletely imaged on this exam. Follow-up recommended.  2.  No other acute abnormality.  3.  Colonic diverticula without acute diverticulitis. No bowel obstruction or inflammation.     EXAM: US ABDOMEN LIMITED  LOCATION: [facility]  DATE: 4/26/2024     INDICATION: elevated lfts  COMPARISON: None.  TECHNIQUE: Limited abdominal ultrasound.     FINDINGS:     GALLBLADDER: Normal. No gallstones, wall thickening, or pericholecystic fluid. Negative sonographic Garcia's sign.     BILE DUCTS: There is intra and extrahepatic biliary dilatation. No cause of obstruction is seen. The common duct measures 11 mm.     LIVER: Fatty infiltration. No focal mass.     RIGHT KIDNEY: No hydronephrosis.     PANCREAS: The visualized portions are normal.     No ascites.                                                                       IMPRESSION:  1.  Biliary dilatation without cause of obstruction seen.  2.  Fatty infiltration of the liver.  I have reviewed the current diagnostic and laboratory tests.            EUS 4/30/24--  Findings:       ENDOSONOGRAPHIC FINDING: :        The celiac axis including lymph nodes was unremarkable.        The left adrenal was examined and normal.        The examined portion of the left lobe of the liver and the gastrohepatic        ligament were evaluated and found to be without abnormalities.        The pancreatic parenchyma demonstrated multiple 5-15 mm cysts throughout        the pancreatic parenchyma without any concerning features. The        pancreatic duct measured 3.4 mm in the head, 1.8 mm in the body, and 0.9        mm in the tail.        The ampulla was normal endoscopically and endosonographically. The bile        duct ranged in size from 1.6 mm near the papilla with narrowing and then        up to 13.9 mm proximal to the narrowed thickened bile duct. The        gallbladder dilated and distended without stones or sludge.                                                                                    Impression:            - Narrowing of the mid to lower CBD with thickening of                          the duct.                          - Dilation of the bile duct proximal to the narrowed                          portion.                          - Multiple small cysts throughout the pancreas.   Recommendation:        - Perform an ERCP today to dilate, brush, biopsy, and                          stent the bile duct stricture.                                                                                      Aldo Gongora MD   ERCP 4/30/24  Findings:       The  film was normal. The esophagus was successfully intubated        under direct vision. The scope was advanced to a normal major papilla in        the descending duodenum without detailed examination of the pharynx,         larynx and associated structures, and upper GI tract. The upper GI tract        was grossly normal. The bile duct was deeply cannulated. Contrast was        injected. The lower third of the main bile duct contained a single        localized stenosis 12 mm in length. The middle third of the main bile        duct and upper third of the main bile duct were diffusely dilated,        secondary to a stricture. The largest diameter was 12 mm. A wire was        passed into the biliary tree. A 7 mm biliary sphincterotomy was made        with a traction (standard) sphincterotome using ERBE electrocautery.        There was no post-sphincterotomy bleeding. Dilation of the common bile        duct with a 6 mm balloon dilator was successful. Cells for cytology were        obtained by brushing in the lower third of the main bile duct. The lower        third of the main bile duct was biopsied with a cold forceps for        histology. One 10 Fr by 5 cm temporary stent with a single external flap        and a single internal flap was placed 5 cm into the common bile duct.        Bile flowed through the stent. The stent was in good position.                                                                                    Moderate Sedation:        GA   Impression:            - A single localized biliary stricture was found in                          the lower third of the main bile duct. The stricture                          was malignant appearing.                          - The upper third of the main bile duct and middle                          third of the main bile duct were dilated, secondary to                          a stricture.                          - A biliary sphincterotomy was performed.                          - Common bile duct was successfully dilated.                          - Cells for cytology obtained in the lower third of                          the main duct.                          - Biopsy was  performed in the lower third of the main                          duct.                          - One temporary stent was placed into the common bile                          duct.   Recommendation:        - No anticoagulation for 72 hours.                          - Clear liquid diet for 24 hours.                          - If pain free after 24 hours advance diet slowly as                          tolerated.                          - Await pathology results.                          - Return patient to referring hospital for ongoing                          care.                          - Repeat ERCP in 10-12 weeks for stent exchange.   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 Biopsies pending-- This 78 yo male presented with rash, jaundice, and RUQ pain. The patient had been on Keflex recently with rash and possible drug-induced liver injury considered. However, abd US and MRCP showed biliary dilation with multiple small cystic lesions along the pancreatic duct. EUS and ERCP 4/30/24 revealed biliary stricture s/p sphincterotomy and temp. Stent placement as above. LFTs are slightly improved. Hgb is stable. Tolerating reg diet.          PLAN   No NSAIDs or anticoagulation for another 24hours.  Diet as tolerated  Follow cytology/biopsy from ERCP and tentatively plan for repeat ERCP in 10-12 weeks per Dr Gongora.   Pruritus seems to be improving- will continue Ursodiol for now.  No objection to discharge to home as planned. Recommend following LFTs through primary care as outpatient.      Total time spent on this encounter=35minutes.         Marika Camacho PA-C  Thank you for the opportunity to participate in the care of this patient.   Please feel free to call me with any questions or concerns.  Phone number [phone].

## 2025-06-11 NOTE — PLAN OF CARE
Problem: Pain Acute  Goal: Optimal Pain Control and Function  Outcome: Not Progressing  Intervention: Develop Pain Management Plan  Recent Flowsheet Documentation  Taken 8/10/2024 1000 by Trent Montero RN  Pain Management Interventions:   pillow support provided   repositioned   rest   declines  Taken 8/10/2024 0852 by Trent Montero, RN  Pain Management Interventions:   medication (see MAR)   pillow support provided   repositioned   rest  Intervention: Prevent or Manage Pain  Recent Flowsheet Documentation  Taken 8/10/2024 1011 by Trent Montero RN  Medication Review/Management: medications reviewed     Problem: Infection  Goal: Absence of Infection Signs and Symptoms  Outcome: Progressing         Patient is A/Ox4, VSS on RA. Patient has not been out of bed due to extreme right knee pain with movement. Pt unable to get patient to stand and recommending the use of a lift device. Patient has been rating his pain 0-9/10. Pain managed with IV and oral medications, rest, repositioning, and ice. Patient states this is effective when he doesn't move. Voiding adequately. Dressing is CDI, full sensation per Pt. IV saline locked between IV antibiotic doses. No new skin issues noted.           Physical Therapy    Visit Type: treatment  SUBJECTIVE    \"I have a roommate and a friend that checks in on me at home.\"    Patient / Family Goal: return home    Pain   RN informed on pain level.    Location: [facility]     OBJECTIVE     Cognitive Status   Affect/Behavior    - cooperative and pleasant  Orientation    - Oriented to: person, place, time and situation  Functional Communication   - Forms of Communication: verbal  Following Direction   - follows all commands and directions consistently  Transition Between Tasks   - transitions without difficulty  Memory   - intact  Safety Awareness/Insight   - intact    Vitals:    Prolonged seated rest breaks for dyspnea recovery. Takes 3-4 minutes to recover following stair negotiation.    Patient Activity Tolerance: 1 to 2 activity to rest         Bed Mobility  - Supine to sit: modified independent  - Sit to supine: modified independent    No safety concerns   Transfers  Assistive devices: gait belt, 2-wheeled walker  - Sit to stand: stand by assist  - Stand to sit: stand by assist    Stands from EOB and low surface couch.    Ambulation / Gait  - Assistive device: gait belt and 2-wheeled walker  - Distance (feet unless otherwise indicated): 150, 25, 150  - Assist Level: supervision  - Surface: even and carpet      Seated rest to pace activity. No unsteadiness noted with use of walker. Pt reports he will use his late mother's walker at home.  Stair Ambulation  - Number of steps: 12;   - Assist Level: supervision  - Railing: left  - Pattern: step-to  - Devices Used: gait belt    Educated on sequencing for pain management and pacing of activity. No safety concerns noted.       Interventions     Training provided: activity tolerance, balance retraining, bed mobility training, safety training, transfer training, breathing/relaxation, functional ambulation and energy conservation    Skilled input: Verbal instruction/cues, tactile instruction/cues and posture correction  Verbal  Consent: Writer verbally educated and received verbal consent for hand placement, positioning of patient, and techniques to be performed today from patient for clothing adjustments for techniques, therapist position for techniques and hand placement and palpation for techniques as described above and how they are pertinent to the patient's plan of care.         Education:   - Present and ready to learn: patient  Education provided during session:  - energy conservation    ASSESSMENT   Progress: progressing toward goals    Discharge needs based on today's assessment:   - Current level of function: slightly below baseline level of function   - Therapy needs at discharge: therapy 1-3 times per week   - Instrumental activities of daily living (IADLs) requiring support at discharge: community mobility, shopping and home management   - Impairments that require further therapy intervention: activity tolerance and pain    AM-PAC  - Generalized Prior Level of Function: IND/MOD I ([facility] 22-24)       Key: MOD A=moderate assistance, IND/MOD I=independent/modified independent  - Generalized Current Level of Function     - Current Mobility Score: 21       AM-PAC Scoring Key= >21 Modified Independent; 20-21 Supervision; 18-19 Minimal assist; 13-17 Moderate assist; 9-12 Max assist; <9 Total assist    Fair tolerance to session. Overall, pt supervision to monitor activity tolerance and suggest activity pacing. Requires slower pacing d/t some ARRIAGA. Pt has roommate and friend to assist PRN.       PLAN (while hospitalized)  Suggestions for next session as indicated: Repeat stairs, energy conservation, progress ambulation distance     PT Frequency: 3-5 x per week      PT/OT Mobility Equipment for Discharge: may need 2ww, continue to assess  PT/OT ADL Equipment for Discharge: continue to assess     Agreement to plan and goals: patient agrees with goals and treatment plan        GOALS  Review Date: 6/15/2025  Long Term Goals: (to be met by  time of discharge from hospital)  Sit to supine: Patient will complete sit to supine independent.  Supine to sit: Patient will complete supine to sit independent.  Sit to stand: Patient will complete sit to stand transfer with no device, independent.   Stand to sit: Patient will complete stand to sit transfer with no device, independent.   Stand pivot: Patient will complete stand pivot transfer with no device, independent.   Ambulation (even): Patient will ambulate on even surface for 150 feet with no device, independent.   Stairs: Patient will ambulate 8 steps with single point cane using one rail, modified independent.   Documented in the chart in the following areas:  Assessment/Plan.       Patient at End of Session:   Location: in bed  Safety measures: call light within reach  Handoff to: nurse        Therapy procedure time and total treatment time can be found documented on the Time Entry flowsheet

## (undated) DEVICE — MANIFOLD KIT ANGIO AUTOMATED 014613

## (undated) DEVICE — ESU ELEC BLADE E-SEP INSULATED NEPTUNE 70MM 0703-070-002

## (undated) DEVICE — GLOVE BIOGEL PI ORTHOPRO SZ 7.5 47675

## (undated) DEVICE — SU PROLENE 4-0 PS-2 18" 8682G

## (undated) DEVICE — PREP SKIN SCRUB TRAY 4461A

## (undated) DEVICE — SUTURE VICRYL+ 2-0 27IN CT-1 UND VCP259H

## (undated) DEVICE — Device

## (undated) DEVICE — NEEDLE SUTURE ANCHOR 1824-5DC

## (undated) DEVICE — SUCTION DRY CHEST DRAIN OASIS 3600-100

## (undated) DEVICE — LINEN FULL SHEET 5511

## (undated) DEVICE — SYR 10ML LL W/O NDL 302995

## (undated) DEVICE — TUBING SUCTION MEDI-VAC 1/4"X20' N620A

## (undated) DEVICE — GUIDEWIRE VASC 0.035INX150CM INQWIRE J TIP IQ35F150J3F/A

## (undated) DEVICE — SLING ARM FOAM L 0814-0794

## (undated) DEVICE — ESU PENCIL SMOKE EVAC W/ROCKER SWITCH 0703-047-000

## (undated) DEVICE — CUSTOM PACK CV ST JOES SCV5BCVHEA

## (undated) DEVICE — GLOVE BIOGEL PI SZ 6.5 40865

## (undated) DEVICE — DRAIN JACKSON PRATT 07FR ROUND SU130-1320

## (undated) DEVICE — WIRE GUIDE 0.35X270CM STRAIGHT TIP VISIGLIDE G-260-3527S

## (undated) DEVICE — SUTURE MONOCRYL 3-0 18 PS2 UND MCP497G

## (undated) DEVICE — DRSG ADAPTIC 3X8" 6113

## (undated) DEVICE — DRSG ABD TNDRSRB WET PRUF 8IN X 10IN STRL  9194A

## (undated) DEVICE — GLOVE SURG PI ULTRA TOUCH M SZ 8-1/2 LF

## (undated) DEVICE — HEMOSTASIS BONE OSTENE 2.5G SYNTHETIC 1503832

## (undated) DEVICE — GOWN IMPERVIOUS BREATHABLE SMART LG 89015

## (undated) DEVICE — SUCTION TIP POOLE K770

## (undated) DEVICE — CATH BALLOON DILATING BIL FUSION TITAN 12FRX8MM FS-BDB-8X4

## (undated) DEVICE — SNARE OVAL SMALL DISP 100600

## (undated) DEVICE — NDL 18GA 1.5" 305196

## (undated) DEVICE — SU VICRYL 1 CP-1 36" J474H

## (undated) DEVICE — ELECTRODE DEFIB CADENCE 22550R

## (undated) DEVICE — EXCHANGE WIRE .035 260 STAR/JFC/035/260/ M001491681

## (undated) DEVICE — SUCTION IRR SYSTEM W/O TIP INTERPULSE HANDPIECE 0210-100-000

## (undated) DEVICE — CUSTOM PACK UPPER EXTREMITY SOP5BUEHEC

## (undated) DEVICE — SU SILK 2-0 FS-1 18" 685G

## (undated) DEVICE — SLEEVE TR BAND RADIAL COMPRESSION DEVICE 24CM TRB24-REG

## (undated) DEVICE — BLADE KNIFE SURG 15 371115

## (undated) DEVICE — LIGACLIP LARGE AESCULAP O4120-1

## (undated) DEVICE — SU MYOSTERNAL WIRE  046-267

## (undated) DEVICE — NDL 27GA 1.25" 305136

## (undated) DEVICE — SU PROLENE 6-0 C-1DA 30" M8706

## (undated) DEVICE — SOL NACL 0.9% IRRIG 1000ML BOTTLE 2F7124

## (undated) DEVICE — DRSG GAUZE 4X4" 3033

## (undated) DEVICE — ESU GROUND PAD ADULT REM W/15' CORD E7507DB

## (undated) DEVICE — FORCEP BIOPSY 1.8MM PEDS 100512

## (undated) DEVICE — NDL 25GA 1.5" 305127

## (undated) DEVICE — TRAY PREP DRY SKIN SCRUB 067

## (undated) DEVICE — PREP CHLORAPREP W/ORANGE TINT 10.5ML 930715

## (undated) DEVICE — DRAPE POUCH INSTRUMENT 1018

## (undated) DEVICE — CATH ANGIO INFINITI 3DRC 4FRX100CM 538476

## (undated) DEVICE — PACK TOTAL KNEE BOXED LATEX FREE PO15TKFCT

## (undated) DEVICE — DEVICE TISSUE STABILIZATION OCTOBASE 28707

## (undated) DEVICE — BONE CEMENT MIXEVAC III HI VAC KIT  0206-015-000

## (undated) DEVICE — BAG CLEAR TRASH 1.3M 39X33" P4040C

## (undated) DEVICE — SUTURE MONOCRYL+ 4-0 PS-2 27IN MCP426H

## (undated) DEVICE — BANDAGE ELASTIC VELCRO 4IN REB3014

## (undated) DEVICE — PACK LOWER EXTREMITY RIDGES

## (undated) DEVICE — CATH DIAG 4FR JL 4.5 538417

## (undated) DEVICE — CLIP SPRING FOGARTY SOFTJAW CSOFT6

## (undated) DEVICE — SU DERMABOND ADVANCED .7ML DNX12

## (undated) DEVICE — LINEN TOWEL PACK X10 5473

## (undated) DEVICE — SU VICRYL 2-0 CT-1 27" UND J259H

## (undated) DEVICE — HOLDER LIMB VELCRO OR 0814-1533

## (undated) DEVICE — PLATE GROUNDING ADULT W/CORD 9165L

## (undated) DEVICE — SUTURE STRATAFIX PDS 3-0 SH

## (undated) DEVICE — SOL WATER IRRIG 1000ML BOTTLE 2F7114

## (undated) DEVICE — GOWN IMPERVIOUS BREATHABLE SMART XLG 89045

## (undated) DEVICE — SET CANNULATION TOUNIQUET TS-10061

## (undated) DEVICE — SU ETHILON 3-0 PS-1 18" 1663G

## (undated) DEVICE — SHTH INTRO 0.021IN ID 6FR DIA

## (undated) DEVICE — KIT HAND CONTROL ACIST 014644 AR-P54

## (undated) DEVICE — GLOVE GAMMEX NEOPRENE ULTRA SZ 7 LF 8514

## (undated) DEVICE — KIT CULTURE ESWAB AEROBE/ANAEROBE WHITE TOP R723480

## (undated) DEVICE — WIRE GUIDE HI-TRQ  WHISPER MS JTIP 0.014"X190CM 1005357HJ

## (undated) DEVICE — GLOVE BIOGEL PI INDICATOR 8.0 LF 41680

## (undated) DEVICE — BLANKET BAIR ADLT PLYMR 60X36IN 63000

## (undated) DEVICE — SUCTION CANISTER MEDIVAC LINER 3000ML W/LID 65651-530

## (undated) DEVICE — DRAPE SHEET SHOULDER ARTHROSCOPY 89070

## (undated) DEVICE — GUIDEWIRE VERRATA PLUS PRESSURE 185CM JTIP 10185JP

## (undated) DEVICE — PREP POVIDONE-IODINE 10% SOLUTION 4OZ BOTTLE MDS093944

## (undated) DEVICE — SUCTION MANIFOLD NEPTUNE 2 SYS 1 PORT 702-025-000

## (undated) DEVICE — RX SURGIFLO HEMOSTATIC MATRIX W/THROMBIN 8ML 2994

## (undated) DEVICE — BONE CLEANING TIP INTERPULSE  0210-010-000

## (undated) DEVICE — SUCTION MANIFOLD NEPTUNE 2 SYS 4 PORT 0702-020-000

## (undated) DEVICE — LINEN HALF SHEET 5512

## (undated) DEVICE — CABLE MYO/LEAD PACING WHITE DISP 019-530

## (undated) DEVICE — SU PROLENE 7-0 BV175-6 24" 2 ARM MONOFILAMENT M8735

## (undated) DEVICE — SU PROLENE 4-0 SHDA 36" 8521H

## (undated) DEVICE — BNDG ELASTIC 4"X5YDS UNSTERILE 6611-40

## (undated) DEVICE — ESU GROUND PAD ADULT W/CORD E7507

## (undated) DEVICE — CATH IVUS OPTICROSS HD 6 3.6FR 1.18MM DIA 135CML H7493935408

## (undated) DEVICE — BLADE SAW SAGITTAL STRK 25X90X1.27MM HD SYS 6 6125-127-090

## (undated) DEVICE — CUSTOM PACK OPEN SHOULDER SOP5BOSHEB

## (undated) DEVICE — CATH BALLOON DILATING BIL FUSION TITAN 12FRX6MM FS-BDB-6X4

## (undated) DEVICE — PREP CHLORAPREP 26ML TINTED HI-LITE ORANGE 930815

## (undated) DEVICE — CUFF TOURN 18IN STRL DISP

## (undated) DEVICE — SU ETHILON 3-0 PS-2 18" 1669H

## (undated) DEVICE — DEVICE INFLATION ENCORE 26 6670

## (undated) DEVICE — SUTURE VICRYL+ 0 27IN CT-1 UND VCP260H

## (undated) DEVICE — PAD CHUX UNDERPAD 23X24" 7136

## (undated) DEVICE — ESU ELEC BLADE 2.75" COATED/INSULATED E1455

## (undated) DEVICE — PACK MAJOR BASIN 673

## (undated) DEVICE — GLOVE PROTEXIS BLUE W/NEU-THERA 7.5  2D73EB75

## (undated) DEVICE — DRSG GAUZE 4X4" TRAY 6939

## (undated) DEVICE — GLOVE LINER COTTON MED 32-2076

## (undated) DEVICE — SU PROLENE 4-0 RB-1DA 36" 8557H

## (undated) DEVICE — CATH DIAG RADIAL 5FR TIG 4.5 100CM

## (undated) DEVICE — SU VICRYL+ 3-0 27IN SH UND VCP416H

## (undated) DEVICE — TUBING IRRIG TUR Y TYPE 96" LF 6543-01

## (undated) DEVICE — PREP POVIDONE IODINE SOLUTION 10% 4OZ

## (undated) DEVICE — SPONGE LAP 18X18" X8435

## (undated) DEVICE — CONNECTOR BLAKE DRAIN SGL BCC1

## (undated) DEVICE — BANDAGE STRETCH GAUZE 4IN 2247

## (undated) DEVICE — GLOVE UNDER INDICATOR PI SZ 6.5 LF 41665

## (undated) DEVICE — CUSTOM PACK LOWER EXTREMITY SOP5BLEHEA

## (undated) DEVICE — SU STRATAFIX PDS PLUS 2-0 SPIRAL CT-1 30CM SXPP1B410

## (undated) DEVICE — INTRO MICRO MINI STICK 4FR STIFF NITINOL 45-753

## (undated) DEVICE — INTRO SHEATH 4FRX10CM PINNACLE RSS402

## (undated) DEVICE — PREP POVIDONE-IODINE 7.5% SCRUB 4OZ BOTTLE MDS093945

## (undated) DEVICE — ENDO BRUSH BILIARY CYTOLOGY 8FRX200CM M00545000

## (undated) DEVICE — SU PLEDGET SOFT TFE 3/8"X3/26"X1/16" PCP40

## (undated) DEVICE — LINEN ORTHO ACL PACK 5447

## (undated) DEVICE — MAT FLOOR SURGICAL 40X38 0702140238

## (undated) DEVICE — DEFIB PRO-PADZ LVP LQD GEL ADULT 8900-2105-01

## (undated) DEVICE — DRAPE IOBAN INCISE 23X17" 6650EZ

## (undated) DEVICE — SURGICEL POWDER ABSORBABLE HEMOSTAT 3GM 3013SP

## (undated) DEVICE — CUSTOM PACK CORONARY SAN5BCRHEA

## (undated) DEVICE — DRSG DRAIN 4X4" 7086

## (undated) DEVICE — DRSG KERLIX 4 1/2"X4YDS ROLL 6715

## (undated) DEVICE — GUIDEWIRE AMPLATZ SUPER STIFF 0.035"X180CM M001465250

## (undated) DEVICE — TUBING INSUFFLATION W/FILTER 28-0207

## (undated) DEVICE — SU ETHIBOND 3-0 BBDA 36" X588H

## (undated) DEVICE — SET HANDPIECE INTERPULSE W/COAXIAL FAN SPRAY TIP 0210118000

## (undated) DEVICE — GLOVE PROTEXIS W/NEU-THERA 6.5  2D73TE65

## (undated) DEVICE — BALLOON EXTRACTION 15X1950MM 3.2MM TL B-V243Q-A

## (undated) DEVICE — BLADE KNIFE BEAVER MICROSHARP GREEN 377515

## (undated) DEVICE — CUSTOM PACK CAB SCV5BCBHEA

## (undated) DEVICE — GLOVE UNDER INDICATOR PI SZ 7.0 LF 41670

## (undated) DEVICE — DRAIN RESERVOIR 100ML JP 0070740

## (undated) DEVICE — SU STERNAL WIRE SINGLE #6 046-032

## (undated) DEVICE — ENDO FUSION OMNI-TOME G31903

## (undated) DEVICE — CATH SUCTION 14FR W/O CTRL DYND41962

## (undated) DEVICE — TUBE CULTURE AEROBIC/ANAEROBIC W/O SWABS A.C.T.I.1. 12401

## (undated) DEVICE — CATH LAUNCHER 6FR EBU 4.0 LA6EBU40

## (undated) DEVICE — SU STRATAFIX MONOCRYL 4-0 SPIRAL PS-2 SXMP1B118

## (undated) DEVICE — GLOVE UNDER INDICATOR PI SZ 6 LF 41660

## (undated) DEVICE — DRSG AQUACEL AG 3.5X12" HYDROFIBER 420670

## (undated) DEVICE — DRSG BIOPATCH GERMICIDAL SPLIT SPONGE 4MM MED 4150

## (undated) DEVICE — BLADE KNIFE SURG 10 371110

## (undated) DEVICE — GLOVE PROTEXIS BLUE W/NEU-THERA 6.5  2D73EB65

## (undated) DEVICE — PITCHER STERILE 1000ML  SSK9004A

## (undated) DEVICE — KIT ENDO TURNOVER/PROCEDURE W/CLEAN A SCOPE LINERS 103888

## (undated) DEVICE — TOURNIQUET CUFF 34" REPRO BROWN 60-7070-106

## (undated) DEVICE — SU VICRYL+ 3-0 CT1 36IN UND VCP944H

## (undated) DEVICE — GLOVE BIOGEL PI ULTRATOUCH G SZ 7.0 42170

## (undated) DEVICE — DRSG TEGADERM 4X4 3/4" 1626W

## (undated) DEVICE — GOWN LG DISP 9515

## (undated) DEVICE — GLOVE BIOGEL INDICATOR 7.5 LF 41675

## (undated) DEVICE — DRSG KERLIX 4 1/2"X4YDS ROLL 6730

## (undated) DEVICE — GLOVE PROTEXIS POWDER FREE 7.0 ORTHOPEDIC 2D73ET70

## (undated) DEVICE — ELECTRODE PATIENT RETURN ADULT L10 FT 2 PLATE CORD 0855C

## (undated) DEVICE — VALVE HEMOSTASIS .096" COPILOT MECH 1003331

## (undated) DEVICE — CABLE MYO/LEAD PACING BLUE DISP 019-535

## (undated) DEVICE — SYR 10ML FINGER CONTROL W/O NDL 309695

## (undated) DEVICE — NEEDLE HYPO 18X1-1/2 SAFETY 305918

## (undated) DEVICE — CATH TRAY URETHRAL 14FR LF 772417

## (undated) DEVICE — GLOVE BIOGEL PI ULTRATOUCH G SZ 6.5 42165

## (undated) DEVICE — KIT ENDO VASOVIEW HEMOPRO 2 VH-4000

## (undated) DEVICE — PREP POVIDONE IODINE SCRUB 7.5% 4OZ APL82212

## (undated) DEVICE — ESU ELEC BLADE NEPTUNE E-SEP 125MM 0703-125-002

## (undated) DEVICE — SU STRATAFIX PDS PLUS 0 CT 45CM SXPP1A406

## (undated) DEVICE — VESSEL LOOPS

## (undated) DEVICE — CELL SAVER

## (undated) DEVICE — CAST PADDING 4" UNSTERILE 9044

## (undated) DEVICE — DEVICE INFLATION SYR W/ HEMOSTASIS VALVE 12IN EXT IN4904

## (undated) DEVICE — CAST PADDING 6" STERILE 9046S

## (undated) DEVICE — SU VICRYL 4-0 PS-2 18" UND J496H

## (undated) DEVICE — PACK CUSTOM CATH LAB RIDGES LF PC15CCFCJ

## (undated) DEVICE — DRSG AQUACEL AG 3.5X9.75" HYDROFIBER 412011

## (undated) DEVICE — CATH DIAG 4FR ANG PIG 538453S

## (undated) DEVICE — LEAD PACER MYOCARDIAL BIPOLAR TEMPORARY 53CM 6495F

## (undated) DEVICE — SYR ANGIOGRAPHY MULTIUSE KIT ACIST 014612

## (undated) DEVICE — GLOVE PROTEXIS POWDER FREE 7.5 ORTHOPEDIC 2D73ET75

## (undated) DEVICE — PUNCH AORTIC 4.0MMX8" RCB40

## (undated) DEVICE — KIT HAND CONTROL ANGIOTOUCH ACIST 65CM AT-P65

## (undated) DEVICE — CATH BALLOON IABP 7.5FRX50ML INTRA-AORTIC 0684-00-0576-01U

## (undated) DEVICE — CONNECTOR CARDIO BLAKE DRAIN BCC2

## (undated) DEVICE — KIT LG BORE TOUHY ACCESS PLUS MAP152

## (undated) RX ORDER — ONDANSETRON 2 MG/ML
INJECTION INTRAMUSCULAR; INTRAVENOUS
Status: DISPENSED
Start: 2018-12-07

## (undated) RX ORDER — FENTANYL CITRATE 50 UG/ML
INJECTION, SOLUTION INTRAMUSCULAR; INTRAVENOUS
Status: DISPENSED
Start: 2024-11-14

## (undated) RX ORDER — ETOMIDATE 2 MG/ML
INJECTION INTRAVENOUS
Status: DISPENSED
Start: 2024-11-18

## (undated) RX ORDER — VANCOMYCIN HYDROCHLORIDE 1 G/20ML
INJECTION, POWDER, LYOPHILIZED, FOR SOLUTION INTRAVENOUS
Status: DISPENSED
Start: 2024-11-18

## (undated) RX ORDER — KETOROLAC TROMETHAMINE 15 MG/ML
INJECTION, SOLUTION INTRAMUSCULAR; INTRAVENOUS
Status: DISPENSED
Start: 2018-12-07

## (undated) RX ORDER — TRIAMCINOLONE ACETONIDE 40 MG/ML
INJECTION, SUSPENSION INTRA-ARTICULAR; INTRAMUSCULAR
Status: DISPENSED
Start: 2021-12-08

## (undated) RX ORDER — LIDOCAINE HYDROCHLORIDE 10 MG/ML
INJECTION, SOLUTION EPIDURAL; INFILTRATION; INTRACAUDAL; PERINEURAL
Status: DISPENSED
Start: 2024-06-19

## (undated) RX ORDER — FENTANYL CITRATE 50 UG/ML
INJECTION, SOLUTION INTRAMUSCULAR; INTRAVENOUS
Status: DISPENSED
Start: 2018-10-15

## (undated) RX ORDER — PROPOFOL 10 MG/ML
INJECTION, EMULSION INTRAVENOUS
Status: DISPENSED
Start: 2024-04-30

## (undated) RX ORDER — FENTANYL CITRATE 50 UG/ML
INJECTION, SOLUTION INTRAMUSCULAR; INTRAVENOUS
Status: DISPENSED
Start: 2025-01-23

## (undated) RX ORDER — NEOSTIGMINE METHYLSULFATE 1 MG/ML
VIAL (ML) INJECTION
Status: DISPENSED
Start: 2024-11-18

## (undated) RX ORDER — FENTANYL CITRATE 50 UG/ML
INJECTION, SOLUTION INTRAMUSCULAR; INTRAVENOUS
Status: DISPENSED
Start: 2024-11-18

## (undated) RX ORDER — FENTANYL CITRATE 50 UG/ML
INJECTION, SOLUTION INTRAMUSCULAR; INTRAVENOUS
Status: DISPENSED
Start: 2020-03-13

## (undated) RX ORDER — ONDANSETRON 2 MG/ML
INJECTION INTRAMUSCULAR; INTRAVENOUS
Status: DISPENSED
Start: 2024-11-18

## (undated) RX ORDER — BUPIVACAINE HYDROCHLORIDE 5 MG/ML
INJECTION, SOLUTION EPIDURAL; INTRACAUDAL
Status: DISPENSED
Start: 2023-08-07

## (undated) RX ORDER — GLYCOPYRROLATE 0.2 MG/ML
INJECTION, SOLUTION INTRAMUSCULAR; INTRAVENOUS
Status: DISPENSED
Start: 2024-11-18

## (undated) RX ORDER — ONDANSETRON 2 MG/ML
INJECTION INTRAMUSCULAR; INTRAVENOUS
Status: DISPENSED
Start: 2024-08-07

## (undated) RX ORDER — DEXAMETHASONE SODIUM PHOSPHATE 10 MG/ML
INJECTION, EMULSION INTRAMUSCULAR; INTRAVENOUS
Status: DISPENSED
Start: 2024-08-07

## (undated) RX ORDER — PROPOFOL 10 MG/ML
INJECTION, EMULSION INTRAVENOUS
Status: DISPENSED
Start: 2024-08-07

## (undated) RX ORDER — FENTANYL CITRATE 50 UG/ML
INJECTION, SOLUTION INTRAMUSCULAR; INTRAVENOUS
Status: DISPENSED
Start: 2023-08-07

## (undated) RX ORDER — LIDOCAINE HYDROCHLORIDE 20 MG/ML
INJECTION, SOLUTION EPIDURAL; INFILTRATION; INTRACAUDAL; PERINEURAL
Status: DISPENSED
Start: 2018-12-07

## (undated) RX ORDER — BUPIVACAINE HYDROCHLORIDE 5 MG/ML
INJECTION, SOLUTION EPIDURAL; INTRACAUDAL
Status: DISPENSED
Start: 2018-10-15

## (undated) RX ORDER — ONDANSETRON 2 MG/ML
INJECTION INTRAMUSCULAR; INTRAVENOUS
Status: DISPENSED
Start: 2023-08-07

## (undated) RX ORDER — BUPIVACAINE HYDROCHLORIDE 5 MG/ML
INJECTION, SOLUTION EPIDURAL; INTRACAUDAL
Status: DISPENSED
Start: 2021-12-08

## (undated) RX ORDER — LIDOCAINE HYDROCHLORIDE 10 MG/ML
INJECTION, SOLUTION EPIDURAL; INFILTRATION; INTRACAUDAL; PERINEURAL
Status: DISPENSED
Start: 2021-12-08

## (undated) RX ORDER — PROPOFOL 10 MG/ML
INJECTION, EMULSION INTRAVENOUS
Status: DISPENSED
Start: 2024-11-18

## (undated) RX ORDER — LIDOCAINE HYDROCHLORIDE 10 MG/ML
INJECTION, SOLUTION INFILTRATION; PERINEURAL
Status: DISPENSED
Start: 2024-06-19

## (undated) RX ORDER — PROPOFOL 10 MG/ML
INJECTION, EMULSION INTRAVENOUS
Status: DISPENSED
Start: 2018-12-07

## (undated) RX ORDER — LIDOCAINE HYDROCHLORIDE 10 MG/ML
INJECTION, SOLUTION EPIDURAL; INFILTRATION; INTRACAUDAL; PERINEURAL
Status: DISPENSED
Start: 2024-11-18

## (undated) RX ORDER — LIDOCAINE HYDROCHLORIDE 10 MG/ML
INJECTION, SOLUTION EPIDURAL; INFILTRATION; INTRACAUDAL; PERINEURAL
Status: DISPENSED
Start: 2024-04-30

## (undated) RX ORDER — KETAMINE HCL IN 0.9 % NACL 50 MG/5 ML
SYRINGE (ML) INTRAVENOUS
Status: DISPENSED
Start: 2018-10-15

## (undated) RX ORDER — FENTANYL CITRATE-0.9 % NACL/PF 10 MCG/ML
PLASTIC BAG, INJECTION (ML) INTRAVENOUS
Status: DISPENSED
Start: 2023-08-07

## (undated) RX ORDER — FENTANYL CITRATE 50 UG/ML
INJECTION, SOLUTION INTRAMUSCULAR; INTRAVENOUS
Status: DISPENSED
Start: 2024-08-07

## (undated) RX ORDER — PROPOFOL 10 MG/ML
INJECTION, EMULSION INTRAVENOUS
Status: DISPENSED
Start: 2023-08-07

## (undated) RX ORDER — FENTANYL CITRATE 50 UG/ML
INJECTION, SOLUTION INTRAMUSCULAR; INTRAVENOUS
Status: DISPENSED
Start: 2024-04-30

## (undated) RX ORDER — FENTANYL CITRATE-0.9 % NACL/PF 10 MCG/ML
PLASTIC BAG, INJECTION (ML) INTRAVENOUS
Status: DISPENSED
Start: 2024-08-07

## (undated) RX ORDER — LIDOCAINE HYDROCHLORIDE 10 MG/ML
INJECTION, SOLUTION EPIDURAL; INFILTRATION; INTRACAUDAL; PERINEURAL
Status: DISPENSED
Start: 2024-08-07